# Patient Record
Sex: FEMALE | Race: WHITE
[De-identification: names, ages, dates, MRNs, and addresses within clinical notes are randomized per-mention and may not be internally consistent; named-entity substitution may affect disease eponyms.]

---

## 2020-03-16 ENCOUNTER — HOSPITAL ENCOUNTER (INPATIENT)
Dept: HOSPITAL 61 - ER | Age: 50
LOS: 155 days | DRG: 3 | End: 2020-08-18
Attending: INTERNAL MEDICINE | Admitting: INTERNAL MEDICINE
Payer: COMMERCIAL

## 2020-03-16 VITALS — SYSTOLIC BLOOD PRESSURE: 90 MMHG | DIASTOLIC BLOOD PRESSURE: 53 MMHG

## 2020-03-16 VITALS — SYSTOLIC BLOOD PRESSURE: 122 MMHG | DIASTOLIC BLOOD PRESSURE: 81 MMHG

## 2020-03-16 VITALS — BODY MASS INDEX: 26.9 KG/M2 | HEIGHT: 68 IN | WEIGHT: 177.47 LBS

## 2020-03-16 VITALS — DIASTOLIC BLOOD PRESSURE: 73 MMHG | SYSTOLIC BLOOD PRESSURE: 109 MMHG

## 2020-03-16 VITALS — DIASTOLIC BLOOD PRESSURE: 76 MMHG | SYSTOLIC BLOOD PRESSURE: 116 MMHG

## 2020-03-16 VITALS — SYSTOLIC BLOOD PRESSURE: 160 MMHG | DIASTOLIC BLOOD PRESSURE: 68 MMHG

## 2020-03-16 DIAGNOSIS — E43: ICD-10-CM

## 2020-03-16 DIAGNOSIS — K80.00: ICD-10-CM

## 2020-03-16 DIAGNOSIS — R13.13: ICD-10-CM

## 2020-03-16 DIAGNOSIS — I50.31: ICD-10-CM

## 2020-03-16 DIAGNOSIS — G93.41: ICD-10-CM

## 2020-03-16 DIAGNOSIS — A41.9: Primary | ICD-10-CM

## 2020-03-16 DIAGNOSIS — D72.810: ICD-10-CM

## 2020-03-16 DIAGNOSIS — N70.91: ICD-10-CM

## 2020-03-16 DIAGNOSIS — Z20.828: ICD-10-CM

## 2020-03-16 DIAGNOSIS — E11.65: ICD-10-CM

## 2020-03-16 DIAGNOSIS — K66.1: ICD-10-CM

## 2020-03-16 DIAGNOSIS — D25.9: ICD-10-CM

## 2020-03-16 DIAGNOSIS — K85.11: ICD-10-CM

## 2020-03-16 DIAGNOSIS — N13.30: ICD-10-CM

## 2020-03-16 DIAGNOSIS — Z99.2: ICD-10-CM

## 2020-03-16 DIAGNOSIS — E83.52: ICD-10-CM

## 2020-03-16 DIAGNOSIS — Z99.11: ICD-10-CM

## 2020-03-16 DIAGNOSIS — E87.0: ICD-10-CM

## 2020-03-16 DIAGNOSIS — Z45.2: ICD-10-CM

## 2020-03-16 DIAGNOSIS — G72.81: ICD-10-CM

## 2020-03-16 DIAGNOSIS — E78.5: ICD-10-CM

## 2020-03-16 DIAGNOSIS — Z51.5: ICD-10-CM

## 2020-03-16 DIAGNOSIS — D68.9: ICD-10-CM

## 2020-03-16 DIAGNOSIS — J96.21: ICD-10-CM

## 2020-03-16 DIAGNOSIS — K44.9: ICD-10-CM

## 2020-03-16 DIAGNOSIS — Z82.49: ICD-10-CM

## 2020-03-16 DIAGNOSIS — F41.0: ICD-10-CM

## 2020-03-16 DIAGNOSIS — Z74.01: ICD-10-CM

## 2020-03-16 DIAGNOSIS — R56.9: ICD-10-CM

## 2020-03-16 DIAGNOSIS — K92.2: ICD-10-CM

## 2020-03-16 DIAGNOSIS — I13.2: ICD-10-CM

## 2020-03-16 DIAGNOSIS — E11.22: ICD-10-CM

## 2020-03-16 DIAGNOSIS — E87.5: ICD-10-CM

## 2020-03-16 DIAGNOSIS — E83.51: ICD-10-CM

## 2020-03-16 DIAGNOSIS — J98.11: ICD-10-CM

## 2020-03-16 DIAGNOSIS — K21.9: ICD-10-CM

## 2020-03-16 DIAGNOSIS — N18.6: ICD-10-CM

## 2020-03-16 DIAGNOSIS — K56.7: ICD-10-CM

## 2020-03-16 DIAGNOSIS — R18.8: ICD-10-CM

## 2020-03-16 DIAGNOSIS — K31.1: ICD-10-CM

## 2020-03-16 DIAGNOSIS — E86.9: ICD-10-CM

## 2020-03-16 DIAGNOSIS — Z98.82: ICD-10-CM

## 2020-03-16 DIAGNOSIS — E66.9: ICD-10-CM

## 2020-03-16 DIAGNOSIS — R50.82: ICD-10-CM

## 2020-03-16 DIAGNOSIS — R65.21: ICD-10-CM

## 2020-03-16 DIAGNOSIS — N17.0: ICD-10-CM

## 2020-03-16 DIAGNOSIS — K76.0: ICD-10-CM

## 2020-03-16 DIAGNOSIS — D62: ICD-10-CM

## 2020-03-16 DIAGNOSIS — F32.9: ICD-10-CM

## 2020-03-16 LAB
% BANDS: 3 % (ref 0–9)
% LYMPHS: 6 % (ref 24–48)
% MONOS: 5 % (ref 0–10)
% SEGS: 86 % (ref 35–66)
ALBUMIN SERPL-MCNC: 3.4 G/DL (ref 3.4–5)
ALBUMIN/GLOB SERPL: 1.3 {RATIO} (ref 1–1.7)
ALP SERPL-CCNC: 87 U/L (ref 46–116)
ALT SERPL-CCNC: 249 U/L (ref 14–59)
ANION GAP SERPL CALC-SCNC: 6 MMOL/L (ref 6–14)
APTT PPP: YELLOW S
AST SERPL-CCNC: 401 U/L (ref 15–37)
BACTERIA #/AREA URNS HPF: 0 /HPF
BASOPHILS # BLD AUTO: 0 X10^3/UL (ref 0–0.2)
BASOPHILS NFR BLD: 0 % (ref 0–3)
BILIRUB SERPL-MCNC: 1.4 MG/DL (ref 0.2–1)
BILIRUB UR QL STRIP: NEGATIVE
BUN SERPL-MCNC: 16 MG/DL (ref 7–20)
BUN/CREAT SERPL: 13 (ref 6–20)
CALCIUM SERPL-MCNC: 8.9 MG/DL (ref 8.5–10.1)
CHLORIDE SERPL-SCNC: 105 MMOL/L (ref 98–107)
CO2 SERPL-SCNC: 27 MMOL/L (ref 21–32)
CREAT SERPL-MCNC: 1.2 MG/DL (ref 0.6–1)
EOSINOPHIL NFR BLD: 0 % (ref 0–3)
EOSINOPHIL NFR BLD: 0 X10^3/UL (ref 0–0.7)
ERYTHROCYTE [DISTWIDTH] IN BLOOD BY AUTOMATED COUNT: 12.9 % (ref 11.5–14.5)
FIBRINOGEN PPP-MCNC: CLEAR MG/DL
GFR SERPLBLD BASED ON 1.73 SQ M-ARVRAT: 47.7 ML/MIN
GLOBULIN SER-MCNC: 2.6 G/DL (ref 2.2–3.8)
GLUCOSE SERPL-MCNC: 196 MG/DL (ref 70–99)
HCT VFR BLD CALC: 44.3 % (ref 36–47)
HGB BLD-MCNC: 15.2 G/DL (ref 12–15.5)
LYMPHOCYTES # BLD: 1.3 X10^3/UL (ref 1–4.8)
LYMPHOCYTES NFR BLD AUTO: 7 % (ref 24–48)
MCH RBC QN AUTO: 33 PG (ref 25–35)
MCHC RBC AUTO-ENTMCNC: 34 G/DL (ref 31–37)
MCV RBC AUTO: 98 FL (ref 79–100)
MONO #: 0.7 X10^3/UL (ref 0–1.1)
MONOCYTES NFR BLD: 4 % (ref 0–9)
NEUT #: 15.7 X10^3/UL (ref 1.8–7.7)
NEUTROPHILS NFR BLD AUTO: 89 % (ref 31–73)
NITRITE UR QL STRIP: NEGATIVE
PH UR STRIP: 5.5 [PH]
PLATELET # BLD AUTO: 294 X10^3/UL (ref 140–400)
PLATELET # BLD EST: ADEQUATE 10*3/UL
POTASSIUM SERPL-SCNC: 4.3 MMOL/L (ref 3.5–5.1)
PROT SERPL-MCNC: 6 G/DL (ref 6.4–8.2)
PROT UR STRIP-MCNC: NEGATIVE MG/DL
RBC # BLD AUTO: 4.55 X10^6/UL (ref 3.5–5.4)
RBC #/AREA URNS HPF: 0 /HPF (ref 0–2)
SODIUM SERPL-SCNC: 138 MMOL/L (ref 136–145)
SQUAMOUS #/AREA URNS LPF: (no result) /LPF
UROBILINOGEN UR-MCNC: 1 MG/DL
WBC # BLD AUTO: 17.7 X10^3/UL (ref 4–11)
WBC #/AREA URNS HPF: 0 /HPF (ref 0–4)

## 2020-03-16 PROCEDURE — 36569 INSJ PICC 5 YR+ W/O IMAGING: CPT

## 2020-03-16 PROCEDURE — 82805 BLOOD GASES W/O2 SATURATION: CPT

## 2020-03-16 PROCEDURE — 76937 US GUIDE VASCULAR ACCESS: CPT

## 2020-03-16 PROCEDURE — 94002 VENT MGMT INPAT INIT DAY: CPT

## 2020-03-16 PROCEDURE — P9046 ALBUMIN (HUMAN), 25%, 20 ML: HCPCS

## 2020-03-16 PROCEDURE — 74170 CT ABD WO CNTRST FLWD CNTRST: CPT

## 2020-03-16 PROCEDURE — 36580 REPLACE CVAD CATH: CPT

## 2020-03-16 PROCEDURE — 84443 ASSAY THYROID STIM HORMONE: CPT

## 2020-03-16 PROCEDURE — 94660 CPAP INITIATION&MGMT: CPT

## 2020-03-16 PROCEDURE — 88304 TISSUE EXAM BY PATHOLOGIST: CPT

## 2020-03-16 PROCEDURE — 87804 INFLUENZA ASSAY W/OPTIC: CPT

## 2020-03-16 PROCEDURE — 86927 PLASMA FRESH FROZEN: CPT

## 2020-03-16 PROCEDURE — 96375 TX/PRO/DX INJ NEW DRUG ADDON: CPT

## 2020-03-16 PROCEDURE — 86920 COMPATIBILITY TEST SPIN: CPT

## 2020-03-16 PROCEDURE — 32557 INSERT CATH PLEURA W/ IMAGE: CPT

## 2020-03-16 PROCEDURE — 82310 ASSAY OF CALCIUM: CPT

## 2020-03-16 PROCEDURE — 99291 CRITICAL CARE FIRST HOUR: CPT

## 2020-03-16 PROCEDURE — 83880 ASSAY OF NATRIURETIC PEPTIDE: CPT

## 2020-03-16 PROCEDURE — 71250 CT THORAX DX C-: CPT

## 2020-03-16 PROCEDURE — A4215 STERILE NEEDLE: HCPCS

## 2020-03-16 PROCEDURE — 85730 THROMBOPLASTIN TIME PARTIAL: CPT

## 2020-03-16 PROCEDURE — C1769 GUIDE WIRE: HCPCS

## 2020-03-16 PROCEDURE — 36415 COLL VENOUS BLD VENIPUNCTURE: CPT

## 2020-03-16 PROCEDURE — 83605 ASSAY OF LACTIC ACID: CPT

## 2020-03-16 PROCEDURE — 87493 C DIFF AMPLIFIED PROBE: CPT

## 2020-03-16 PROCEDURE — 32555 ASPIRATE PLEURA W/ IMAGING: CPT

## 2020-03-16 PROCEDURE — 88312 SPECIAL STAINS GROUP 1: CPT

## 2020-03-16 PROCEDURE — 99153 MOD SED SAME PHYS/QHP EA: CPT

## 2020-03-16 PROCEDURE — 82607 VITAMIN B-12: CPT

## 2020-03-16 PROCEDURE — 51798 US URINE CAPACITY MEASURE: CPT

## 2020-03-16 PROCEDURE — 76700 US EXAM ABDOM COMPLETE: CPT

## 2020-03-16 PROCEDURE — 87077 CULTURE AEROBIC IDENTIFY: CPT

## 2020-03-16 PROCEDURE — P9017 PLASMA 1 DONOR FRZ W/IN 8 HR: HCPCS

## 2020-03-16 PROCEDURE — 96361 HYDRATE IV INFUSION ADD-ON: CPT

## 2020-03-16 PROCEDURE — 86900 BLOOD TYPING SEROLOGIC ABO: CPT

## 2020-03-16 PROCEDURE — 87103 BLOOD FUNGUS CULTURE: CPT

## 2020-03-16 PROCEDURE — 85610 PROTHROMBIN TIME: CPT

## 2020-03-16 PROCEDURE — 84478 ASSAY OF TRIGLYCERIDES: CPT

## 2020-03-16 PROCEDURE — 86317 IMMUNOASSAY INFECTIOUS AGENT: CPT

## 2020-03-16 PROCEDURE — P9041 ALBUMIN (HUMAN),5%, 50ML: HCPCS

## 2020-03-16 PROCEDURE — C9113 INJ PANTOPRAZOLE SODIUM, VIA: HCPCS

## 2020-03-16 PROCEDURE — 93306 TTE W/DOPPLER COMPLETE: CPT

## 2020-03-16 PROCEDURE — P9016 RBC LEUKOCYTES REDUCED: HCPCS

## 2020-03-16 PROCEDURE — 94640 AIRWAY INHALATION TREATMENT: CPT

## 2020-03-16 PROCEDURE — 86850 RBC ANTIBODY SCREEN: CPT

## 2020-03-16 PROCEDURE — 76770 US EXAM ABDO BACK WALL COMP: CPT

## 2020-03-16 PROCEDURE — 88305 TISSUE EXAM BY PATHOLOGIST: CPT

## 2020-03-16 PROCEDURE — 99152 MOD SED SAME PHYS/QHP 5/>YRS: CPT

## 2020-03-16 PROCEDURE — 86334 IMMUNOFIX E-PHORESIS SERUM: CPT

## 2020-03-16 PROCEDURE — 83615 LACTATE (LD) (LDH) ENZYME: CPT

## 2020-03-16 PROCEDURE — 36556 INSERT NON-TUNNEL CV CATH: CPT

## 2020-03-16 PROCEDURE — 36600 WITHDRAWAL OF ARTERIAL BLOOD: CPT

## 2020-03-16 PROCEDURE — C1729 CATH, DRAINAGE: HCPCS

## 2020-03-16 PROCEDURE — 84132 ASSAY OF SERUM POTASSIUM: CPT

## 2020-03-16 PROCEDURE — 85025 COMPLETE CBC W/AUTO DIFF WBC: CPT

## 2020-03-16 PROCEDURE — 80069 RENAL FUNCTION PANEL: CPT

## 2020-03-16 PROCEDURE — 81001 URINALYSIS AUTO W/SCOPE: CPT

## 2020-03-16 PROCEDURE — 85014 HEMATOCRIT: CPT

## 2020-03-16 PROCEDURE — 84157 ASSAY OF PROTEIN OTHER: CPT

## 2020-03-16 PROCEDURE — 76705 ECHO EXAM OF ABDOMEN: CPT

## 2020-03-16 PROCEDURE — 87071 CULTURE AEROBIC QUANT OTHER: CPT

## 2020-03-16 PROCEDURE — 49083 ABD PARACENTESIS W/IMAGING: CPT

## 2020-03-16 PROCEDURE — 74018 RADEX ABDOMEN 1 VIEW: CPT

## 2020-03-16 PROCEDURE — 83735 ASSAY OF MAGNESIUM: CPT

## 2020-03-16 PROCEDURE — 82784 ASSAY IGA/IGD/IGG/IGM EACH: CPT

## 2020-03-16 PROCEDURE — 77001 FLUOROGUIDE FOR VEIN DEVICE: CPT

## 2020-03-16 PROCEDURE — 71275 CT ANGIOGRAPHY CHEST: CPT

## 2020-03-16 PROCEDURE — C1892 INTRO/SHEATH,FIXED,PEEL-AWAY: HCPCS

## 2020-03-16 PROCEDURE — 49406 IMAGE CATH FLUID PERI/RETRO: CPT

## 2020-03-16 PROCEDURE — 87186 SC STD MICRODIL/AGAR DIL: CPT

## 2020-03-16 PROCEDURE — 80053 COMPREHEN METABOLIC PANEL: CPT

## 2020-03-16 PROCEDURE — P9045 ALBUMIN (HUMAN), 5%, 250 ML: HCPCS

## 2020-03-16 PROCEDURE — 82945 GLUCOSE OTHER FLUID: CPT

## 2020-03-16 PROCEDURE — 94760 N-INVAS EAR/PLS OXIMETRY 1: CPT

## 2020-03-16 PROCEDURE — 87102 FUNGUS ISOLATION CULTURE: CPT

## 2020-03-16 PROCEDURE — 74300 X-RAY BILE DUCTS/PANCREAS: CPT

## 2020-03-16 PROCEDURE — 82550 ASSAY OF CK (CPK): CPT

## 2020-03-16 PROCEDURE — 85007 BL SMEAR W/DIFF WBC COUNT: CPT

## 2020-03-16 PROCEDURE — 74177 CT ABD & PELVIS W/CONTRAST: CPT

## 2020-03-16 PROCEDURE — 87086 URINE CULTURE/COLONY COUNT: CPT

## 2020-03-16 PROCEDURE — 85045 AUTOMATED RETICULOCYTE COUNT: CPT

## 2020-03-16 PROCEDURE — 80076 HEPATIC FUNCTION PANEL: CPT

## 2020-03-16 PROCEDURE — 82306 VITAMIN D 25 HYDROXY: CPT

## 2020-03-16 PROCEDURE — 83550 IRON BINDING TEST: CPT

## 2020-03-16 PROCEDURE — 36573 INSJ PICC RS&I 5 YR+: CPT

## 2020-03-16 PROCEDURE — 82274 ASSAY TEST FOR BLOOD FECAL: CPT

## 2020-03-16 PROCEDURE — 83540 ASSAY OF IRON: CPT

## 2020-03-16 PROCEDURE — 82150 ASSAY OF AMYLASE: CPT

## 2020-03-16 PROCEDURE — 88112 CYTOPATH CELL ENHANCE TECH: CPT

## 2020-03-16 PROCEDURE — 49452 REPLACE G-J TUBE PERC: CPT

## 2020-03-16 PROCEDURE — 80202 ASSAY OF VANCOMYCIN: CPT

## 2020-03-16 PROCEDURE — 82962 GLUCOSE BLOOD TEST: CPT

## 2020-03-16 PROCEDURE — 84100 ASSAY OF PHOSPHORUS: CPT

## 2020-03-16 PROCEDURE — 87070 CULTURE OTHR SPECIMN AEROBIC: CPT

## 2020-03-16 PROCEDURE — 74176 CT ABD & PELVIS W/O CONTRAST: CPT

## 2020-03-16 PROCEDURE — 87205 SMEAR GRAM STAIN: CPT

## 2020-03-16 PROCEDURE — 85027 COMPLETE CBC AUTOMATED: CPT

## 2020-03-16 PROCEDURE — 83036 HEMOGLOBIN GLYCOSYLATED A1C: CPT

## 2020-03-16 PROCEDURE — 93970 EXTREMITY STUDY: CPT

## 2020-03-16 PROCEDURE — 74230 X-RAY XM SWLNG FUNCJ C+: CPT

## 2020-03-16 PROCEDURE — 86901 BLOOD TYPING SEROLOGIC RH(D): CPT

## 2020-03-16 PROCEDURE — 96365 THER/PROPH/DIAG IV INF INIT: CPT

## 2020-03-16 PROCEDURE — 76856 US EXAM PELVIC COMPLETE: CPT

## 2020-03-16 PROCEDURE — 83690 ASSAY OF LIPASE: CPT

## 2020-03-16 PROCEDURE — 87340 HEPATITIS B SURFACE AG IA: CPT

## 2020-03-16 PROCEDURE — 74174 CTA ABD&PLVS W/CONTRAST: CPT

## 2020-03-16 PROCEDURE — 94003 VENT MGMT INPAT SUBQ DAY: CPT

## 2020-03-16 PROCEDURE — 87075 CULTR BACTERIA EXCEPT BLOOD: CPT

## 2020-03-16 PROCEDURE — C1894 INTRO/SHEATH, NON-LASER: HCPCS

## 2020-03-16 PROCEDURE — C1751 CATH, INF, PER/CENT/MIDLINE: HCPCS

## 2020-03-16 PROCEDURE — 87040 BLOOD CULTURE FOR BACTERIA: CPT

## 2020-03-16 PROCEDURE — 85018 HEMOGLOBIN: CPT

## 2020-03-16 PROCEDURE — G0378 HOSPITAL OBSERVATION PER HR: HCPCS

## 2020-03-16 PROCEDURE — 71045 X-RAY EXAM CHEST 1 VIEW: CPT

## 2020-03-16 PROCEDURE — 31720 CLEARANCE OF AIRWAYS: CPT

## 2020-03-16 PROCEDURE — C1757 CATH, THROMBECTOMY/EMBOLECT: HCPCS

## 2020-03-16 PROCEDURE — 95816 EEG AWAKE AND DROWSY: CPT

## 2020-03-16 PROCEDURE — 80048 BASIC METABOLIC PNL TOTAL CA: CPT

## 2020-03-16 PROCEDURE — 87106 FUNGI IDENTIFICATION YEAST: CPT

## 2020-03-16 PROCEDURE — 83970 ASSAY OF PARATHORMONE: CPT

## 2020-03-16 PROCEDURE — 87635 SARS-COV-2 COVID-19 AMP PRB: CPT

## 2020-03-16 PROCEDURE — 76830 TRANSVAGINAL US NON-OB: CPT

## 2020-03-16 PROCEDURE — 86704 HEP B CORE ANTIBODY TOTAL: CPT

## 2020-03-16 RX ADMIN — MORPHINE SULFATE PRN MG: 2 INJECTION, SOLUTION INTRAMUSCULAR; INTRAVENOUS at 14:11

## 2020-03-16 RX ADMIN — HYDROMORPHONE HYDROCHLORIDE PRN MG: 2 INJECTION INTRAMUSCULAR; INTRAVENOUS; SUBCUTANEOUS at 23:02

## 2020-03-16 RX ADMIN — MORPHINE SULFATE PRN MG: 2 INJECTION, SOLUTION INTRAMUSCULAR; INTRAVENOUS at 20:58

## 2020-03-16 RX ADMIN — INSULIN LISPRO SCH UNITS: 100 INJECTION, SOLUTION INTRAVENOUS; SUBCUTANEOUS at 18:00

## 2020-03-16 RX ADMIN — PANTOPRAZOLE SODIUM SCH MG: 40 INJECTION, POWDER, FOR SOLUTION INTRAVENOUS at 12:03

## 2020-03-16 RX ADMIN — INSULIN LISPRO SCH UNITS: 100 INJECTION, SOLUTION INTRAVENOUS; SUBCUTANEOUS at 09:30

## 2020-03-16 RX ADMIN — INSULIN LISPRO SCH UNITS: 100 INJECTION, SOLUTION INTRAVENOUS; SUBCUTANEOUS at 13:28

## 2020-03-16 RX ADMIN — BACITRACIN SCH MLS/HR: 5000 INJECTION, POWDER, FOR SOLUTION INTRAMUSCULAR at 16:07

## 2020-03-16 RX ADMIN — ONDANSETRON PRN MG: 2 INJECTION INTRAMUSCULAR; INTRAVENOUS at 16:13

## 2020-03-16 RX ADMIN — PROCHLORPERAZINE EDISYLATE PRN MG: 5 INJECTION INTRAMUSCULAR; INTRAVENOUS at 17:46

## 2020-03-16 RX ADMIN — HYDROMORPHONE HYDROCHLORIDE PRN MG: 2 INJECTION INTRAMUSCULAR; INTRAVENOUS; SUBCUTANEOUS at 09:11

## 2020-03-16 RX ADMIN — MORPHINE SULFATE PRN MG: 2 INJECTION, SOLUTION INTRAMUSCULAR; INTRAVENOUS at 12:02

## 2020-03-16 RX ADMIN — ONDANSETRON PRN MG: 2 INJECTION INTRAMUSCULAR; INTRAVENOUS at 20:58

## 2020-03-16 RX ADMIN — MORPHINE SULFATE PRN MG: 2 INJECTION, SOLUTION INTRAMUSCULAR; INTRAVENOUS at 07:50

## 2020-03-16 RX ADMIN — MORPHINE SULFATE PRN MG: 2 INJECTION, SOLUTION INTRAMUSCULAR; INTRAVENOUS at 10:10

## 2020-03-16 RX ADMIN — BACITRACIN SCH MLS/HR: 5000 INJECTION, POWDER, FOR SOLUTION INTRAMUSCULAR at 05:46

## 2020-03-16 RX ADMIN — ONDANSETRON PRN MG: 2 INJECTION INTRAMUSCULAR; INTRAVENOUS at 12:30

## 2020-03-16 RX ADMIN — HYDROMORPHONE HYDROCHLORIDE PRN MG: 2 INJECTION INTRAMUSCULAR; INTRAVENOUS; SUBCUTANEOUS at 12:31

## 2020-03-16 RX ADMIN — MORPHINE SULFATE PRN MG: 2 INJECTION, SOLUTION INTRAMUSCULAR; INTRAVENOUS at 16:07

## 2020-03-16 RX ADMIN — HYDROMORPHONE HYDROCHLORIDE PRN MG: 2 INJECTION INTRAMUSCULAR; INTRAVENOUS; SUBCUTANEOUS at 17:02

## 2020-03-16 RX ADMIN — METOPROLOL TARTRATE SCH MG: 5 INJECTION INTRAVENOUS at 20:58

## 2020-03-16 RX ADMIN — BACITRACIN SCH MLS/HR: 5000 INJECTION, POWDER, FOR SOLUTION INTRAMUSCULAR at 20:56

## 2020-03-16 NOTE — PDOC1
History and Physical


Date of Admission


Date of Admission


DATE: 3/16/20 


TIME: 09:19





Identification/Chief Complaint


Chief Complaint


Abdominal pain





Source


Source:  Patient





History of Present Illness


History of Present Illness


Ms Tadeo is a 50yo F w/ PMHx HTN, prediabetes who presents the emergency room

complaints of abdominal pain. Patient described off and on 3 days. She states is

constant, described as a squeezing sensation in a band-like distribution. + 

nausea, vomiting.  She denies any fever or diarrhea.  Patient denies any 

abdominal surgical procedures.  She states is worse with movements, car ride.  

Pain initially was upper abdomen however now pretty much generalized.  Last 

bowel movement was 3/15/2020. Nothing makes her pain better.  Patient denies any

shortness of breath.  She does state the pain moves into her chest.  Denies any 

headache or visual changes.


Lipase 50573, , , Bilirubin 1.4.


CT abdomen confirms pancreatic inflammation, peripancreatic fluid and 

inflammatory changes around the pancreas consistent with pancreatitis. 

Cholelithiasis and 1.4cm uterine fibroid as well as possible left salpingitis. 

Admitted for further care





Past Medical History


Cardiovascular:  HTN





Past Surgical History


Past Surgical History:  Other (Breast augmentation)





Family History


Family History:  High Cholestrol, Hypertension





Social History


Smoke:  No


ALCOHOL:  rare


Drugs:  None





Current Problem List


Problem List


Problems


Medical Problems:


(1) Acute pancreatitis


Status: Acute  





(2) Cholelithiasis


Status: Acute  











Current Medications


Current Medications





Current Medications


Sodium Chloride 1,000 ml @  1,000 mls/hr Q1H IV  Last administered on 3/16/20at 

03:00;  Start 3/16/20 at 03:00;  Stop 3/16/20 at 03:59;  Status DC


Ondansetron HCl (Zofran) 4 mg 1X  ONCE IVP  Last administered on 3/16/20at 

03:27;  Start 3/16/20 at 03:00;  Stop 3/16/20 at 03:01;  Status DC


Morphine Sulfate (Morphine Sulfate) 4 mg 1X  ONCE IV ;  Start 3/16/20 at 03:00; 

Stop 3/16/20 at 03:01;  Status Cancel


Ketorolac Tromethamine (Toradol 30mg Vial) 30 mg 1X  ONCE IV  Last administered 

on 3/16/20at 02:54;  Start 3/16/20 at 03:00;  Stop 3/16/20 at 03:01;  Status DC


Fentanyl Citrate (Fentanyl 2ml Vial) 25 mcg 1X  ONCE IVP  Last administered on 

3/16/20at 03:23;  Start 3/16/20 at 03:30;  Stop 3/16/20 at 03:31;  Status DC


Fentanyl Citrate (Fentanyl 2ml Vial) 100 mcg STK-MED ONCE .ROUTE ;  Start 

3/16/20 at 03:18;  Stop 3/16/20 at 03:18;  Status DC


Iohexol (Omnipaque 350 Mg/ml) 90 ml 1X  ONCE IV  Last administered on 3/16/20at 

03:25;  Start 3/16/20 at 03:30;  Stop 3/16/20 at 03:31;  Status DC


Info (CONTRAST GIVEN -- Rx MONITORING) 1 each PRN DAILY  PRN MC SEE COMMENTS;  

Start 3/16/20 at 03:30;  Stop 3/18/20 at 03:29


Hydromorphone HCl (Dilaudid) 0.5 mg 1X  ONCE IV  Last administered on 3/16/20at 

03:55;  Start 3/16/20 at 04:30;  Stop 3/16/20 at 04:32;  Status DC


Ondansetron HCl (Zofran) 4 mg PRN Q8HRS  PRN IV NAUSEA/VOMITING 1ST CHOICE;  

Start 3/16/20 at 05:00;  Stop 3/17/20 at 04:59


Morphine Sulfate (Morphine Sulfate) 2 mg PRN Q2HR  PRN IV SEVERE PAIN 7-10 Last 

administered on 3/16/20at 07:50;  Start 3/16/20 at 05:00;  Stop 3/17/20 at 04:59


Sodium Chloride 1,000 ml @  125 mls/hr Q8H IV  Last administered on 3/16/20at 

05:46;  Start 3/16/20 at 05:00;  Stop 3/17/20 at 04:59


Hydromorphone HCl (Dilaudid) 0.5 mg PRN Q3HRS  PRN IV SEVERE PAIN 7-10 Last 

administered on 3/16/20at 09:11;  Start 3/16/20 at 05:00


Piperacillin Sod/ Tazobactam Sod 4.5 gm/Sodium Chloride 100 ml @  200 mls/hr 1X 

ONCE IV  Last administered on 3/16/20at 05:44;  Start 3/16/20 at 06:00;  Stop 

3/16/20 at 06:29;  Status DC





Allergies


Allergies:  


Coded Allergies:  


     codeine (Verified  Allergy, Intermediate, rash, 3/16/20)





ROS


General:  YES: Appetite; 


   No: Chills, Night Sweats, Fatigue, Malaise, Other


PSYCHOLOGICAL ROS:  No: Anxiety, Behavioral Disorder, Concentration difficultie,

Decreased libido, Depression, Disorientation, Hallucinations, Hostility, 

Irritablity, Memory difficulties, Mood Swings, Obsessive thoughts, Physical 

abuse, Sexual abuse, Sleep disturbances, Suicidal ideation, Other


Eyes:  No Blurry vision, No Decreased vision, No Double vision, No Dry eyes, No 

Excessive tearing, No Eye Pain, No Itchy Eyes, No Loss of vision, No 

Photophobia, No Scotomata, No Uses contacts, No Uses glasses, No Other


HEENT:  No: Heacaches, Visual Changes, Hearing change, Nasal congestion, Nasal 

discharge, Oral lesions, Sinus pain, Sore Throat, Epistaxis, Sneezing, Snoring, 

Tinnitus, Vertigo, Vocal changes, Other


ALLERGY AND IMMUNOLOGY:  No: Hives, Insect Bite Sensitivity, Itchy/Watery Eyes, 

Nasal Congestion, Post Nasal Drip, Seasonal Allergies, Other


Hematological and Lymphatic:  No: Bleeding Problems, Blood Clots, Blood 

Transfusions, Brusing, Night Sweats, Pallor, Swollen Lymph Nodes, Other


ENDOCRINE:  No: Breast Changes, Galactorrhea, Hair Pattern Changes, Hot Flashes,

Malaise/lethargy, Mood Swings, Palpitations, Polydipsia/polyuria, Skin Changes, 

Temperature Intolerance, Unexpected Weight Changes, Other


Breast:  No New/Changing Breast Lumps, No Nipple changes, No Nipple discharge, 

No Other


Respiratory:  No: Cough, Hemoptysis, Orthopnea, Pleuritic Pain, Shortness of 

breath, SOB with excertion, Sputum Changes, Stridor, Tachypnea, Wheezing, Other


Cardiovascular:  No Chest Pain, No Palpitations, No Orthopnea, No Paroxysmal 

Noc. Dyspnea, No Edema, No Lt Headedness, No Other


Gastrointestinal:  Yes Nausea, Yes Vomiting, Yes Abdominal Pain; 


   No Diarrhea, No Constipation, No Melena, No Hematochezia, No Other


Genitourinary:  No Dysuria, No Frequency, No Incontinence, No Hematuria, No 

Retention, No Discharge, No Urgency, No Pain, No Flank Pain, No Other, No , No ,

No , No , No , No , No 


Musculoskeletal:  No Gait Disturbance, No Joint Pain, No Joint Stiffness, No 

Joint Swelling, No Muscle Pain, No Muscular Weakness, No Pain In:, No Swelling 

In:, No Other


Neurological:  No Behavorial Changes, No Bowel/Bladder ControlChng, No 

Confusion, No Dizziness, No Gait Disturbance, No Headaches, No Impaired 

Coord/balance, No Memory Loss, No Numbness/Tingling, No Seizures, No Speech 

Problems, No Tremors, No Visual Changes, No Weakness, No Other


Skin:  No Dry Skin, No Eczema, No Hair Changes, No Lumps, No Mole Changes, No 

Mottling, No Nail Changes, No Pruritus, No Rash, No Skin Lesion Changes, No 

Other, No Acne





Physical Exam


General:  Alert, Oriented X3, Cooperative, moderate distress


HEENT:  Atraumatic, PERRLA, EOMI, Mucous membr. moist/pink


Lungs:  Clear to auscultation, Normal air movement


Heart:  S1S2, RRR, no thrills, no rubs, no gallops, no murmurs


Abdomen:  Normal bowel sounds, Soft, No hepatosplenomegaly, No masses, Other 

(Diffuse tenderness)


Rectal Exam:  not examined


Extremities:  No clubbing, No cyanosis, No edema, Normal pulses, No 

tenderness/swelling


Skin:  No rashes, No breakdown, No significant lesion


Neuro:  Normal gait, Normal speech, Strength at 5/5 X4 ext, Normal tone, 

Sensation intact, Cranial nerves 3-12 NL, Reflexes 2+


Psych/Mental Status:  Mental status NL, Mood NL





Vitals


Vitals





Vital Signs








  Date Time  Temp Pulse Resp B/P (MAP) Pulse Ox O2 Delivery O2 Flow Rate FiO2


 


3/16/20 09:11     99   


 


3/16/20 07:50      Room Air  


 


3/16/20 07:00 97.4 61 16 90/53 (65)    





 97.4       











Labs


Labs





Laboratory Tests








Test


 3/16/20


03:40 3/16/20


05:31


 


White Blood Count


 17.7 x10^3/uL


(4.0-11.0) 





 


Red Blood Count


 4.55 x10^6/uL


(3.50-5.40) 





 


Hemoglobin


 15.2 g/dL


(12.0-15.5) 





 


Hematocrit


 44.3 %


(36.0-47.0) 





 


Mean Corpuscular Volume 98 fL ()  


 


Mean Corpuscular Hemoglobin 33 pg (25-35)  


 


Mean Corpuscular Hemoglobin


Concent 34 g/dL


(31-37) 





 


Red Cell Distribution Width


 12.9 %


(11.5-14.5) 





 


Platelet Count


 294 x10^3/uL


(140-400) 





 


Neutrophils (%) (Auto) 89 % (31-73)  


 


Lymphocytes (%) (Auto) 7 % (24-48)  


 


Monocytes (%) (Auto) 4 % (0-9)  


 


Eosinophils (%) (Auto) 0 % (0-3)  


 


Basophils (%) (Auto) 0 % (0-3)  


 


Neutrophils # (Auto)


 15.7 x10^3/uL


(1.8-7.7) 





 


Lymphocytes # (Auto)


 1.3 x10^3/uL


(1.0-4.8) 





 


Monocytes # (Auto)


 0.7 x10^3/uL


(0.0-1.1) 





 


Eosinophils # (Auto)


 0.0 x10^3/uL


(0.0-0.7) 





 


Basophils # (Auto)


 0.0 x10^3/uL


(0.0-0.2) 





 


Segmented Neutrophils % 86 % (35-66)  


 


Band Neutrophils % 3 % (0-9)  


 


Lymphocytes % 6 % (24-48)  


 


Monocytes % 5 % (0-10)  


 


Platelet Estimate


 Adequate


(ADEQUATE) 





 


Sodium Level


 138 mmol/L


(136-145) 





 


Potassium Level


 4.3 mmol/L


(3.5-5.1) 





 


Chloride Level


 105 mmol/L


() 





 


Carbon Dioxide Level


 27 mmol/L


(21-32) 





 


Anion Gap 6 (6-14)  


 


Blood Urea Nitrogen


 16 mg/dL


(7-20) 





 


Creatinine


 1.2 mg/dL


(0.6-1.0) 





 


Estimated GFR


(Cockcroft-Gault) 47.7 


 





 


BUN/Creatinine Ratio 13 (6-20)  


 


Glucose Level


 196 mg/dL


(70-99) 





 


Calcium Level


 8.9 mg/dL


(8.5-10.1) 





 


Total Bilirubin


 1.4 mg/dL


(0.2-1.0) 





 


Aspartate Amino Transf


(AST/SGOT) 401 U/L


(15-37) 





 


Alanine Aminotransferase


(ALT/SGPT) 249 U/L


(14-59) 





 


Alkaline Phosphatase


 87 U/L


() 





 


Total Protein


 6.0 g/dL


(6.4-8.2) 





 


Albumin


 3.4 g/dL


(3.4-5.0) 





 


Albumin/Globulin Ratio 1.3 (1.0-1.7)  


 


Lipase


 60250 U/L


() 





 


Urine Collection Type  Unknown 


 


Urine Color  Yellow 


 


Urine Clarity  Clear 


 


Urine pH  5.5 (<5.0-8.0) 


 


Urine Specific Gravity


 


 >=1.030


(1.000-1.030)


 


Urine Protein


 


 Negative mg/dL


(NEG-TRACE)


 


Urine Glucose (UA)


 


 Negative mg/dL


(NEG)


 


Urine Ketones (Stick)


 


 Negative mg/dL


(NEG)


 


Urine Blood  Negative (NEG) 


 


Urine Nitrite  Negative (NEG) 


 


Urine Bilirubin  Negative (NEG) 


 


Urine Urobilinogen Dipstick


 


 1.0 mg/dL (0.2


mg/dL)


 


Urine Leukocyte Esterase  Negative (NEG) 


 


Urine RBC  0 /HPF (0-2) 


 


Urine WBC  0 /HPF (0-4) 


 


Urine Squamous Epithelial


Cells 


 Few /LPF 





 


Urine Bacteria  0 /HPF (0-FEW) 








Laboratory Tests








Test


 3/16/20


03:40 3/16/20


05:31


 


White Blood Count


 17.7 x10^3/uL


(4.0-11.0) 





 


Red Blood Count


 4.55 x10^6/uL


(3.50-5.40) 





 


Hemoglobin


 15.2 g/dL


(12.0-15.5) 





 


Hematocrit


 44.3 %


(36.0-47.0) 





 


Mean Corpuscular Volume 98 fL ()  


 


Mean Corpuscular Hemoglobin 33 pg (25-35)  


 


Mean Corpuscular Hemoglobin


Concent 34 g/dL


(31-37) 





 


Red Cell Distribution Width


 12.9 %


(11.5-14.5) 





 


Platelet Count


 294 x10^3/uL


(140-400) 





 


Neutrophils (%) (Auto) 89 % (31-73)  


 


Lymphocytes (%) (Auto) 7 % (24-48)  


 


Monocytes (%) (Auto) 4 % (0-9)  


 


Eosinophils (%) (Auto) 0 % (0-3)  


 


Basophils (%) (Auto) 0 % (0-3)  


 


Neutrophils # (Auto)


 15.7 x10^3/uL


(1.8-7.7) 





 


Lymphocytes # (Auto)


 1.3 x10^3/uL


(1.0-4.8) 





 


Monocytes # (Auto)


 0.7 x10^3/uL


(0.0-1.1) 





 


Eosinophils # (Auto)


 0.0 x10^3/uL


(0.0-0.7) 





 


Basophils # (Auto)


 0.0 x10^3/uL


(0.0-0.2) 





 


Segmented Neutrophils % 86 % (35-66)  


 


Band Neutrophils % 3 % (0-9)  


 


Lymphocytes % 6 % (24-48)  


 


Monocytes % 5 % (0-10)  


 


Platelet Estimate


 Adequate


(ADEQUATE) 





 


Sodium Level


 138 mmol/L


(136-145) 





 


Potassium Level


 4.3 mmol/L


(3.5-5.1) 





 


Chloride Level


 105 mmol/L


() 





 


Carbon Dioxide Level


 27 mmol/L


(21-32) 





 


Anion Gap 6 (6-14)  


 


Blood Urea Nitrogen


 16 mg/dL


(7-20) 





 


Creatinine


 1.2 mg/dL


(0.6-1.0) 





 


Estimated GFR


(Cockcroft-Gault) 47.7 


 





 


BUN/Creatinine Ratio 13 (6-20)  


 


Glucose Level


 196 mg/dL


(70-99) 





 


Calcium Level


 8.9 mg/dL


(8.5-10.1) 





 


Total Bilirubin


 1.4 mg/dL


(0.2-1.0) 





 


Aspartate Amino Transf


(AST/SGOT) 401 U/L


(15-37) 





 


Alanine Aminotransferase


(ALT/SGPT) 249 U/L


(14-59) 





 


Alkaline Phosphatase


 87 U/L


() 





 


Total Protein


 6.0 g/dL


(6.4-8.2) 





 


Albumin


 3.4 g/dL


(3.4-5.0) 





 


Albumin/Globulin Ratio 1.3 (1.0-1.7)  


 


Lipase


 19420 U/L


() 





 


Urine Collection Type  Unknown 


 


Urine Color  Yellow 


 


Urine Clarity  Clear 


 


Urine pH  5.5 (<5.0-8.0) 


 


Urine Specific Gravity


 


 >=1.030


(1.000-1.030)


 


Urine Protein


 


 Negative mg/dL


(NEG-TRACE)


 


Urine Glucose (UA)


 


 Negative mg/dL


(NEG)


 


Urine Ketones (Stick)


 


 Negative mg/dL


(NEG)


 


Urine Blood  Negative (NEG) 


 


Urine Nitrite  Negative (NEG) 


 


Urine Bilirubin  Negative (NEG) 


 


Urine Urobilinogen Dipstick


 


 1.0 mg/dL (0.2


mg/dL)


 


Urine Leukocyte Esterase  Negative (NEG) 


 


Urine RBC  0 /HPF (0-2) 


 


Urine WBC  0 /HPF (0-4) 


 


Urine Squamous Epithelial


Cells 


 Few /LPF 





 


Urine Bacteria  0 /HPF (0-FEW) 











Images


Images


CT neck/chest/abdomen/pelvis - No abnormality seen at the thyroid gland. The 

trachea and mainstem bronchi show no intraluminal lesions. No abnormality seen 

at the esophagus. There is a small hiatal hernia. The thoracic aorta shows no 

aneurysmal dilatation or dissection. The heart size is normal with no 

pericardial effusion. Lung fields show some dependent atelectasis but no 

consolidated infiltrates, nodules or pleural effusions are seen. No acute bony 

abnormalities are seen in the thorax. Bilateral breast implants appear to be 

present and appear to be intact.


 


The liver contains a small cystic lesion measuring 1.5 cm in size. No 

abnormality is seen at the spleen, or adrenal glands. Gallbladder shows 

cholelithiasis. The pancreas shows enlarged pancreatic head and there is fluid 

and inflammatory type changes in the peripancreatic region consistent with 

pancreatitis. The abdominal aorta shows no aneurysmal dilatation or dissection. 

No abnormality seen at the inferior vena cava. 


The kidneys show no renal masses, renal calculi, hydronephrosis or evidence of 

obstructive uropathy. No abnormality seen in the stomach or duodenum. The small 

intestinal tract shows some mild dilatation proximally but no small bowel 

obstruction is seen. There are a few diverticuli in the sigmoid colon but no 

evidence of diverticulitis or colitis. No abnormality seen at the appendix.


 


The bladder is not distended. The uterus shows a small 1.4 cm hyperdensity 

posteriorly which may represent a fibroid. There is some tubular enhancement in 

the left adnexa which may reflect enhancement of the fallopian tube and could 

reflect salpingitis. No free fluid is seen in the pelvis. No free air is seen in

the abdomen or pelvis.


 


IMPRESSION:


 


Small hiatal hernia. No aneurysm or dissection involving the thoracic or 

abdominal aorta. 1.5 cm cystic lesion in the liver. Prominent pancreatic head 

with peripancreatic fluid and inflammatory changes around the pancreas 

consistent with pancreatitis. Cholelithiasis. Tubular enhancement in the left 

adnexa possibly reflecting salpingitis. 1.4 cm hypodensity within the posterior 

uterus possibly representing a fibroid.





RUQ US


Evaluation of the pancreas is limited due to bowel gas. Pancreatic head however 

appears enlarged. The abdominal aorta and inferior vena cava are poorly 

visualized. The liver is normal in size but shows fatty infiltration without a 

focal lesion identified. Gallbladder shows cholelithiasis with wall thickening 

at 4.2 mm but no pericholecystic fluid is seen. Common bile duct is normal in 

caliber at 5.6 mm. Right kidney measures 11.9 x 6.2 x 4.3 cm in greatest 

dimension and shows normal cortical medullary differentiation with no mass or 

hydronephrosis seen.


 


IMPRESSION:


Prominent pancreatic head. Cholelithiasis with wall thickening.





Transabdominal and transvaginal ultrasound:


Transabdominally, there is poor visualization of the pelvic structures.


Transvaginally, the uterus measures 8.7 x 6.0 x 5.8 cm in greatest dimension. 

There is a small 1.3 x 1.1 x 1.7 cm uterine mass consistent with a probable 

fibroid. Endometrium is not abnormally thickened at 11.5 mm. The left ovary 

measures 2.2 x 3.3 x 2.7 cm in greatest dimension and contains a small 5.2 mm 

follicle right ovary measures 2.1 x 0.9 x 1.9 cm in greatest dimension and 

contains several small follicles measuring up to 5.3 mm in size. There appears 

to be normal vascular flow bilaterally in the ovaries. There is a small amount 

of free fluid present in the pelvis. No fallopian tube dilatation is seen to 

suggest salpingitis sonographically. There are however some prominent veins 

present.


 


IMPRESSION:


Small uterine lesion probably representing a fibroid measuring 1.7 cm in 

greatest dimension. Small follicles bilaterally in the ovaries. Small amount of 

free fluid in the pelvis. Prominent veins in the left adnexa. No fallopian tube 

abnormality apparent.





VTE Prophylaxis Ordered


VTE Prophylaxis Devices:  No


VTE Pharmacological Prophylaxi:  Yes





Assessment/Plan


Assessment/Plan


A/P:


Acute pancreatitis - without necrosis or infection, likely gallstone 

pancreatitis. GI and general surgery consulted. Will keep NPO, pain control


Calculus of gallbladder with acute cholecystitis without obstruction - with 

secondary pancreatitis. Lap naif is indicated


Prediabetes - will keep on sliding scale


HTN - cont prn hydralazine


Leukocytosis - she meets SIRS criteria with acute pancreatitis as end organ 

dysfunction, no sign of infection





FEN - NPO


PPX - lovenox


FULL CODE


Dispo - inpatient for 2 midnights











GAETANO GONCALVES MD        Mar 16, 2020 09:25

## 2020-03-16 NOTE — RAD
CT angiogram of the chest, abdomen and pelvis:

 

Reason for examination: Abdominal pain straight to back. Hypotension and 

abdominal distention.

 

Helical images were obtained through the chest, abdomen and pelvis with 

intravenous administration of 90 cc Omnipaque 350 using angiographic 

protocol. 3-D MIPS reconstruction was performed in sagittal and coronal 

planes.

 

Exposure: One or more of the following individualized dose reduction 

techniques were utilized for this examination:  1. Automated exposure 

control  2. Adjustment of the mA and/or kV according to patient size  3. 

Use of iterative reconstruction technique.

 

No abnormality seen at the thyroid gland. The trachea and mainstem bronchi

show no intraluminal lesions. No abnormality seen at the esophagus. There 

is a small hiatal hernia. The thoracic aorta shows no aneurysmal 

dilatation or dissection. The heart size is normal with no pericardial 

effusion. Lung fields show some dependent atelectasis but no consolidated 

infiltrates, nodules or pleural effusions are seen. No acute bony 

abnormalities are seen in the thorax. Bilateral breast implants appear to 

be present and appear to be intact.

 

The liver contains a small cystic lesion measuring 1.5 cm in size. No 

abnormality is seen at the spleen, or adrenal glands. Gallbladder shows 

cholelithiasis. The pancreas shows enlarged pancreatic head and there is 

fluid and inflammatory type changes in the peripancreatic region 

consistent with pancreatitis. The abdominal aorta shows no aneurysmal 

dilatation or dissection. No abnormality seen at the inferior vena cava. 

The kidneys show no renal masses, renal calculi, hydronephrosis or 

evidence of obstructive uropathy. No abnormality seen in the stomach or 

duodenum. The small intestinal tract shows some mild dilatation proximally

but no small bowel obstruction is seen. There are a few diverticuli in the

sigmoid colon but no evidence of diverticulitis or colitis. No abnormality

seen at the appendix.

 

The bladder is not distended. The uterus shows a small 1.4 cm hyperdensity

posteriorly which may represent a fibroid. There is some tubular 

enhancement in the left adnexa which may reflect enhancement of the 

fallopian tube and could reflect salpingitis. No free fluid is seen in the

pelvis. No free air is seen in the abdomen or pelvis.

 

IMPRESSION:

 

Small hiatal hernia. No aneurysm or dissection involving the thoracic or 

abdominal aorta. 1.5 cm cystic lesion in the liver. Prominent pancreatic 

head with peripancreatic fluid and inflammatory changes around the 

pancreas consistent with pancreatitis. Cholelithiasis. Tubular enhancement

in the left adnexa possibly reflecting salpingitis. 1.4 cm hypodensity 

within the posterior uterus possibly representing a fibroid.

 

Electronically signed by: Ladan Mckenna MD (3/16/2020 3:36 AM) [id number]

## 2020-03-16 NOTE — PHYS DOC
Adult General


Chief Complaint


Chief Complaint:  ABDOMINAL PAIN





HPI


HPI





40-year-old female presents the emergency room complaints of abdominal pain.  

Patient described off and on x2 days worse tonight.  She states is constant, 

described as a squeezing sensation.  She does describe nausea, vomiting.  She 

denies any fever or diarrhea.  Patient denies any abdominal surgical procedures.

 She states is worse with movements, car ride.  Pain initially was upper abdomen

however now pretty much generalized.  Last bowel movement was today.  Patient is

only past medical history includes hypertension and diabetes.  Nothing makes her

pain better.  Patient denies any shortness of breath.  She does state the pain 

moves into her chest.  Denies any headache or visual changes.





Review of Systems


Review of Systems





Constitutional: Denies fever or chills []


Respiratory: Denies cough or shortness of breath []


Cardiovascular: No additional information not addressed in HPI []


GI: + abdominal pain, nausea, vomiting, no bloody stools or diarrhea []


: Denies dysuria or hematuria []


Musculoskeletal: Denies back pain or joint pain []


Integument: psoriasis


Neurologic: Denies headache, focal weakness or sensory changes []





All other systems were reviewed and found to be within normal limits, except as 

documented in this note.





Current Medications


Current Medications





Current Medications








 Medications


  (Trade)  Dose


 Ordered  Sig/Yee  Start Time


 Stop Time Status Last Admin


Dose Admin


 


 Fentanyl Citrate


  (Fentanyl 2ml


 Vial)  100 mcg  STK-MED ONCE  3/16/20 03:18


 3/16/20 03:18 DC  





 


 Hydromorphone HCl


  (Dilaudid)  0.5 mg  PRN Q3HRS  PRN  3/16/20 05:00


     





 


 Info


  (CONTRAST GIVEN


 -- Rx MONITORING)  1 each  PRN DAILY  PRN  3/16/20 03:30


 3/18/20 03:29   





 


 Iohexol


  (Omnipaque 350


 Mg/ml)  90 ml  1X  ONCE  3/16/20 03:30


 3/16/20 03:31 DC 3/16/20 03:25


90 ML


 


 Ketorolac


 Tromethamine


  (Toradol 30mg


 Vial)  30 mg  1X  ONCE  3/16/20 03:00


 3/16/20 03:01 DC 3/16/20 02:54


30 MG


 


 Morphine Sulfate


  (Morphine


 Sulfate)  2 mg  PRN Q2HR  PRN  3/16/20 05:00


 3/17/20 04:59   





 


 Ondansetron HCl


  (Zofran)  4 mg  PRN Q8HRS  PRN  3/16/20 05:00


 3/17/20 04:59   





 


 Sodium Chloride  1,000 ml @ 


 125 mls/hr  Q8H  3/16/20 05:00


 3/17/20 04:59   














Allergies


Allergies





Allergies








Coded Allergies Type Severity Reaction Last Updated Verified


 


  codeine Allergy Intermediate rash 3/16/20 Yes











Physical Exam


Physical Exam





Constitutional: Well developed, well nourished, distress 2/2 pain, diaphoretic. 

[]


HENT: Normocephalic, atraumatic, bilateral external ears normal, oropharynx 

moist, no oral exudates, nose normal. []


Eyes: PERRLA, EOMI, conjunctiva normal, no discharge. [] 


Cardiovascular:Heart rate regular rhythm, no murmur []


Lungs & Thorax:  Bilateral breath sounds clear to auscultation []


Abdomen: Bowel sounds decreased, soft, TTP throughout, no masses, no pulsatile 

masses. [] 


Skin: Warm, dry, no erythema, no rash. [] 


Back: No tenderness, no CVA tenderness. [] 


Extremities: No tenderness, no edema. [] 


Neurologic: Alert and oriented X 3, no focal deficits noted. []


Psychologic: Affect normal, judgement normal, mood normal. []





Current Patient Data


Vital Signs





                                   Vital Signs








  Date Time  Temp Pulse Resp B/P (MAP) Pulse Ox O2 Delivery O2 Flow Rate FiO2


 


3/16/20 03:55   16  100 Room Air  


 


3/16/20 02:25 97.6 63  106/57 (73)    





 97.6       








Lab Values





                                Laboratory Tests








Test


 3/16/20


03:40


 


White Blood Count


 17.7 x10^3/uL


(4.0-11.0)  H


 


Red Blood Count


 4.55 x10^6/uL


(3.50-5.40)


 


Hemoglobin


 15.2 g/dL


(12.0-15.5)


 


Hematocrit


 44.3 %


(36.0-47.0)


 


Mean Corpuscular Volume


 98 fL ()





 


Mean Corpuscular Hemoglobin 33 pg (25-35)  


 


Mean Corpuscular Hemoglobin


Concent 34 g/dL


(31-37)


 


Red Cell Distribution Width


 12.9 %


(11.5-14.5)


 


Platelet Count


 294 x10^3/uL


(140-400)


 


Neutrophils (%) (Auto) 89 % (31-73)  H


 


Lymphocytes (%) (Auto) 7 % (24-48)  L


 


Monocytes (%) (Auto) 4 % (0-9)  


 


Eosinophils (%) (Auto) 0 % (0-3)  


 


Basophils (%) (Auto) 0 % (0-3)  


 


Neutrophils # (Auto)


 15.7 x10^3/uL


(1.8-7.7)  H


 


Lymphocytes # (Auto)


 1.3 x10^3/uL


(1.0-4.8)


 


Monocytes # (Auto)


 0.7 x10^3/uL


(0.0-1.1)


 


Eosinophils # (Auto)


 0.0 x10^3/uL


(0.0-0.7)


 


Basophils # (Auto)


 0.0 x10^3/uL


(0.0-0.2)


 


Platelet Estimate Pending  


 


Sodium Level


 138 mmol/L


(136-145)


 


Potassium Level


 4.3 mmol/L


(3.5-5.1)


 


Chloride Level


 105 mmol/L


()


 


Carbon Dioxide Level


 27 mmol/L


(21-32)


 


Anion Gap 6 (6-14)  


 


Blood Urea Nitrogen


 16 mg/dL


(7-20)


 


Creatinine


 1.2 mg/dL


(0.6-1.0)  H


 


Estimated GFR


(Cockcroft-Gault) 47.7  





 


BUN/Creatinine Ratio 13 (6-20)  


 


Glucose Level


 196 mg/dL


(70-99)  H


 


Calcium Level


 8.9 mg/dL


(8.5-10.1)


 


Total Bilirubin


 1.4 mg/dL


(0.2-1.0)  H


 


Aspartate Amino Transferase


(AST) 401 U/L


(15-37)  H


 


Alanine Aminotransferase (ALT)


 249 U/L


(14-59)  H


 


Alkaline Phosphatase


 87 U/L


()


 


Total Protein


 6.0 g/dL


(6.4-8.2)  L


 


Albumin


 3.4 g/dL


(3.4-5.0)


 


Albumin/Globulin Ratio 1.3 (1.0-1.7)  


 


Lipase


 59126 U/L


()  H





                                Laboratory Tests


3/16/20 03:40








                                Laboratory Tests


3/16/20 03:40











EKG


EKG


[]





Radiology/Procedures


Radiology/Procedures


[facility]


                    [address] [place] [address]


                                 [phone]


                                        


                                 IMAGING REPORT





                                     Signed





PATIENT: JESENIA RICH  ACCOUNT: [id number]     MRN#: [id number]


: 1970           LOCATION: ER              AGE: 49


SEX: F                    EXAM DT: 20         ACCESSION#: [id number]


STATUS: PRE ER            ORD. PHYSICIAN: KEVIN JONES MD


REASON: abdominal pain straight to back, hypotension, distention


PROCEDURE: CT ANGIO CHEST ABD PELVIS





CT angiogram of the chest, abdomen and pelvis:


 


Reason for examination: Abdominal pain straight to back. Hypotension and 


abdominal distention.


 


Helical images were obtained through the chest, abdomen and pelvis with 


intravenous administration of 90 cc Omnipaque 350 using angiographic 


protocol. 3-D MIPS reconstruction was performed in sagittal and coronal 


planes.


 


Exposure: One or more of the following individualized dose reduction 


techniques were utilized for this examination:  1. Automated exposure 


control  2. Adjustment of the mA and/or kV according to patient size  3. 


Use of iterative reconstruction technique.


 


No abnormality seen at the thyroid gland. The trachea and mainstem bronchi


show no intraluminal lesions. No abnormality seen at the esophagus. There 


is a small hiatal hernia. The thoracic aorta shows no aneurysmal 


dilatation or dissection. The heart size is normal with no pericardial 


effusion. Lung fields show some dependent atelectasis but no consolidated 


infiltrates, nodules or pleural effusions are seen. No acute bony 


abnormalities are seen in the thorax. Bilateral breast implants appear to 


be present and appear to be intact.


 


The liver contains a small cystic lesion measuring 1.5 cm in size. No 


abnormality is seen at the spleen, or adrenal glands. Gallbladder shows 


cholelithiasis. The pancreas shows enlarged pancreatic head and there is 


fluid and inflammatory type changes in the peripancreatic region 


consistent with pancreatitis. The abdominal aorta shows no aneurysmal 


dilatation or dissection. No abnormality seen at the inferior vena cava. 


The kidneys show no renal masses, renal calculi, hydronephrosis or 


evidence of obstructive uropathy. No abnormality seen in the stomach or 


duodenum. The small intestinal tract shows some mild dilatation proximally


but no small bowel obstruction is seen. There are a few diverticuli in the


sigmoid colon but no evidence of diverticulitis or colitis. No abnormality


seen at the appendix.


 


The bladder is not distended. The uterus shows a small 1.4 cm hyperdensity


posteriorly which may represent a fibroid. There is some tubular 


enhancement in the left adnexa which may reflect enhancement of the 


fallopian tube and could reflect salpingitis. No free fluid is seen in the


pelvis. No free air is seen in the abdomen or pelvis.


 


IMPRESSION:


 


Small hiatal hernia. No aneurysm or dissection involving the thoracic or 


abdominal aorta. 1.5 cm cystic lesion in the liver. Prominent pancreatic 


head with peripancreatic fluid and inflammatory changes around the 


pancreas consistent with pancreatitis. Cholelithiasis. Tubular enhancement


in the left adnexa possibly reflecting salpingitis. 1.4 cm hypodensity 


within the posterior uterus possibly representing a fibroid.


 


Electronically signed by: Machelle Up MD (3/16/2020 3:36 AM) [id number]














DICTATED and SIGNED BY:     MACHELLE UP MD


DATE:     20 033


[]





                            [address] [address]


                                 [phone]


                                        


                                 IMAGING REPORT





                                     Signed





PATIENT: JESENIA RICH  ACCOUNT: [id number]     MRN#: [id number]


: 1970           LOCATION: ER              AGE: 49


SEX: F                    EXAM DT: 20         ACCESSION#: [id number]


STATUS: PRE ER            ORD. PHYSICIAN: KEVIN JONES MD


REASON: abnormal CT, cholelithiasis


PROCEDURE: ABDOMEN LTD





Abdominal ultrasound Limited:


 


Reason for examination: Abnormal CT with cholelithiasis.


 


Evaluation of the pancreas is limited due to bowel gas. Pancreatic head 


however appears enlarged. The abdominal aorta and inferior vena cava are 


poorly visualized. The liver is normal in size but shows fatty 


infiltration without a focal lesion identified. Gallbladder shows 


cholelithiasis with wall thickening at 4.2 mm but no pericholecystic fluid


is seen. Common bile duct is normal in caliber at 5.6 mm. Right kidney 


measures 11.9 x 6.2 x 4.3 cm in greatest dimension and shows normal 


cortical medullary differentiation with no mass or hydronephrosis seen.


 


IMPRESSION:


 


Prominent pancreatic head. Cholelithiasis with wall thickening.


 


Electronically signed by: Machelle Up MD (3/16/2020 4:37 AM) [id number]














DICTATED and SIGNED BY:     MACHELLE UP MD


DATE:     20 0437





                            [id number] [place] [address]


                                 [phone]


                                        


                                 IMAGING REPORT





                                     Signed





PATIENT: JESENIA RICH  ACCOUNT: [id number]     MRN#: [id number]


: 1970           LOCATION: ER              AGE: 49


SEX: F                    EXAM DT: 20         ACCESSION#: [id number]


STATUS: REG ER            ORD. PHYSICIAN: KEVIN JONES MD


REASON: possible left salpingitis


PROCEDURE: PELVIS W/TV





Pelvic ultrasound with transvaginal:


 


Reason for examination: Possible left salpingitis.


 


Transabdominal and transvaginal ultrasound examination of the pelvis was 


performed.


 


Transabdominally, there is poor visualization of the pelvic structures.


 


Transvaginally, the uterus measures 8.7 x 6.0 x 5.8 cm in greatest 


dimension. There is a small 1.3 x 1.1 x 1.7 cm uterine mass consistent 


with a probable fibroid. Endometrium is not abnormally thickened at 11.5 


mm. The left ovary measures 2.2 x 3.3 x 2.7 cm in greatest dimension and 


contains a small 5.2 mm follicle right ovary measures 2.1 x 0.9 x 1.9 cm 


in greatest dimension and contains several small follicles measuring up to


5.3 mm in size. There appears to be normal vascular flow bilaterally in 


the ovaries. There is a small amount of free fluid present in the pelvis. 


No fallopian tube dilatation is seen to suggest salpingitis 


sonographically. There are however some prominent veins present.


 


IMPRESSION:


 


Small uterine lesion probably representing a fibroid measuring 1.7 cm in 


greatest dimension. Small follicles bilaterally in the ovaries. Small 


amount of free fluid in the pelvis. Prominent veins in the left adnexa. No


fallopian tube abnormality apparent.


 


Electronically signed by: Machelle Up MD (3/16/2020 4:55 AM) [id number]














DICTATED and SIGNED BY:     MACHELLE UP MD


DATE:     20 0455





Course & Med Decision Making


Course & Med Decision Making


Pertinent Labs and Imaging studies reviewed. (See chart for details)





[]40-year-old female presents the emergency room complaints of abdominal pain.  

Patient described off and on x2 days worse tonight.  She states is constant, 

described as a squeezing sensation.  She does describe nausea, vomiting.  She 

denies any fever or diarrhea.  Patient denies any abdominal surgical procedures.

  She states is worse with movements, car ride.  Pain initially was upper 

abdomen however now pretty much generalized.  Last bowel movement was today.  

Patient is only past medical history includes hypertension and diabetes.  

Nothing makes her pain better.  Patient denies any shortness of breath.  She 

does state the pain moves into her chest.  Denies any headache or visual 

changes.





Dragon Disclaimer


Dragon Disclaimer


This electronic medical record was generated, in whole or in part, using a voice

 recognition dictation system.





Departure


Departure


Impression:  


   Primary Impression:  


   Acute pancreatitis


   Additional Impression:  


   Cholelithiasis


Disposition:   ADMITTED AS INPATIENT


Admitting Physician:  HIMS


Condition:  IMPROVED


Critical Care Time


 Critical care time was 35 minutes exclusive of procedures.





Problem Qualifiers








   Primary Impression:  


   Acute pancreatitis


   Pancreatitis type:  unspecified pancreatitis type  Acute pancreatitis 

   complication:  unspecified  Qualified Codes:  K85.90 - Acute pancreatitis 

   without necrosis or infection, unspecified


   Additional Impression:  


   Cholelithiasis


   Cholelithiasis location:  gallbladder  Cholecystitis presence:  with naif

   cystitis  Cholecystitis acuity:  acute  Biliary obstruction:  without biliary

    obstruction  Qualified Codes:  K80.00 - Calculus of gallbladder with acute 

   cholecystitis without obstruction








KEVIN JONES MD              Mar 16, 2020 02:40

## 2020-03-16 NOTE — PDOC2
GI CONSULT


Reason For Consult:


pancreatitis





HPI:


HPI:


50 y/o female ill for a couple days w/ upper abdominal pain ("band" radiating to

lower back) and vomiting.  Feels tight.  Has occurred in the past but much less 

severe.  Feels weak and short of breath.





H/o occasional acid reflux - takes Tums PRN.  Rare dysphagia w/ solid foods, 

sometimes needs to cough up.  No hematemesis, hematochezia, melena, diarrhea, 

constipation, or weight loss.  No GB, liver, pancreas, or PUD history.  No 

previous EGD or colonoscopy.  Occasional ibuprofen.





PMH:


PMH:


HTN, borderline HLD and DM


breast augmentation, nasal surgery





FH:


Family History:  No pertinent hx (denies GI cancers)





Social History:


Smoke:  No


ALCOHOL:  occassional


Drugs:  None





ROS:





GEN: Denies fevers, chills, sweats


HEENT: Denies blurred vision, sore throat


CV: Denies chest pain


RESP: Denies shortness of air, cough


GI: Per HPI


: Denies hematuria, dysuria


ENDO: Denies weight changes


NEURO: Denies confusion, dizziness


MSK: Denies weakness, joint pain/swelling


SKIN: Denies jaundice, pruritus





Vitals:


Vitals:





                                   Vital Signs








  Date Time  Temp Pulse Resp B/P (MAP) Pulse Ox O2 Delivery O2 Flow Rate FiO2


 


3/16/20 09:11     99   


 


3/16/20 07:50      Room Air  


 


3/16/20 07:00 97.4 61 16 90/53 (65)    





 97.4       











Labs:


Labs:





Laboratory Tests








Test


 3/16/20


03:40 3/16/20


05:31


 


White Blood Count


 17.7 x10^3/uL


(4.0-11.0) 





 


Red Blood Count


 4.55 x10^6/uL


(3.50-5.40) 





 


Hemoglobin


 15.2 g/dL


(12.0-15.5) 





 


Hematocrit


 44.3 %


(36.0-47.0) 





 


Mean Corpuscular Volume 98 fL ()  


 


Mean Corpuscular Hemoglobin 33 pg (25-35)  


 


Mean Corpuscular Hemoglobin


Concent 34 g/dL


(31-37) 





 


Red Cell Distribution Width


 12.9 %


(11.5-14.5) 





 


Platelet Count


 294 x10^3/uL


(140-400) 





 


Neutrophils (%) (Auto) 89 % (31-73)  


 


Lymphocytes (%) (Auto) 7 % (24-48)  


 


Monocytes (%) (Auto) 4 % (0-9)  


 


Eosinophils (%) (Auto) 0 % (0-3)  


 


Basophils (%) (Auto) 0 % (0-3)  


 


Neutrophils # (Auto)


 15.7 x10^3/uL


(1.8-7.7) 





 


Lymphocytes # (Auto)


 1.3 x10^3/uL


(1.0-4.8) 





 


Monocytes # (Auto)


 0.7 x10^3/uL


(0.0-1.1) 





 


Eosinophils # (Auto)


 0.0 x10^3/uL


(0.0-0.7) 





 


Basophils # (Auto)


 0.0 x10^3/uL


(0.0-0.2) 





 


Segmented Neutrophils % 86 % (35-66)  


 


Band Neutrophils % 3 % (0-9)  


 


Lymphocytes % 6 % (24-48)  


 


Monocytes % 5 % (0-10)  


 


Platelet Estimate


 Adequate


(ADEQUATE) 





 


Sodium Level


 138 mmol/L


(136-145) 





 


Potassium Level


 4.3 mmol/L


(3.5-5.1) 





 


Chloride Level


 105 mmol/L


() 





 


Carbon Dioxide Level


 27 mmol/L


(21-32) 





 


Anion Gap 6 (6-14)  


 


Blood Urea Nitrogen


 16 mg/dL


(7-20) 





 


Creatinine


 1.2 mg/dL


(0.6-1.0) 





 


Estimated GFR


(Cockcroft-Gault) 47.7 


 





 


BUN/Creatinine Ratio 13 (6-20)  


 


Glucose Level


 196 mg/dL


(70-99) 





 


Calcium Level


 8.9 mg/dL


(8.5-10.1) 





 


Total Bilirubin


 1.4 mg/dL


(0.2-1.0) 





 


Aspartate Amino Transf


(AST/SGOT) 401 U/L


(15-37) 





 


Alanine Aminotransferase


(ALT/SGPT) 249 U/L


(14-59) 





 


Alkaline Phosphatase


 87 U/L


() 





 


Total Protein


 6.0 g/dL


(6.4-8.2) 





 


Albumin


 3.4 g/dL


(3.4-5.0) 





 


Albumin/Globulin Ratio 1.3 (1.0-1.7)  


 


Lipase


 26019 U/L


() 





 


Urine Collection Type  Unknown 


 


Urine Color  Yellow 


 


Urine Clarity  Clear 


 


Urine pH  5.5 (<5.0-8.0) 


 


Urine Specific Gravity


 


 >=1.030


(1.000-1.030)


 


Urine Protein


 


 Negative mg/dL


(NEG-TRACE)


 


Urine Glucose (UA)


 


 Negative mg/dL


(NEG)


 


Urine Ketones (Stick)


 


 Negative mg/dL


(NEG)


 


Urine Blood  Negative (NEG) 


 


Urine Nitrite  Negative (NEG) 


 


Urine Bilirubin  Negative (NEG) 


 


Urine Urobilinogen Dipstick


 


 1.0 mg/dL (0.2


mg/dL)


 


Urine Leukocyte Esterase  Negative (NEG) 


 


Urine RBC  0 /HPF (0-2) 


 


Urine WBC  0 /HPF (0-4) 


 


Urine Squamous Epithelial


Cells 


 Few /LPF 





 


Urine Bacteria  0 /HPF (0-FEW) 











Allergies:


Coded Allergies:  


     codeine (Verified  Allergy, Intermediate, rash, 3/16/20)





Medications:





Current Medications








 Medications


  (Trade)  Dose


 Ordered  Sig/Yee


 Route


 PRN Reason  Start Time


 Stop Time Status Last Admin


Dose Admin


 


 Sodium Chloride  1,000 ml @ 


 1,000 mls/hr  Q1H


 IV


   3/16/20 03:00


 3/16/20 03:59 DC 3/16/20 03:00





 


 Ondansetron HCl


  (Zofran)  4 mg  1X  ONCE


 IVP


   3/16/20 03:00


 3/16/20 03:01 DC 3/16/20 03:27





 


 Ketorolac


 Tromethamine


  (Toradol 30mg


 Vial)  30 mg  1X  ONCE


 IV


   3/16/20 03:00


 3/16/20 03:01 DC 3/16/20 02:54





 


 Fentanyl Citrate


  (Fentanyl 2ml


 Vial)  25 mcg  1X  ONCE


 IVP


   3/16/20 03:30


 3/16/20 03:31 DC 3/16/20 03:23





 


 Iohexol


  (Omnipaque 350


 Mg/ml)  90 ml  1X  ONCE


 IV


   3/16/20 03:30


 3/16/20 03:31 DC 3/16/20 03:25





 


 Hydromorphone HCl


  (Dilaudid)  0.5 mg  1X  ONCE


 IV


   3/16/20 04:30


 3/16/20 04:32 DC 3/16/20 03:55





 


 Morphine Sulfate


  (Morphine


 Sulfate)  2 mg  PRN Q2HR  PRN


 IV


 SEVERE PAIN 7-10  3/16/20 05:00


    3/16/20 07:50





 


 Sodium Chloride  1,000 ml @ 


 125 mls/hr  Q8H


 IV


   3/16/20 05:00


 3/17/20 04:59  3/16/20 05:46





 


 Hydromorphone HCl


  (Dilaudid)  0.5 mg  PRN Q3HRS  PRN


 IV


 SEVERE PAIN 7-10  3/16/20 05:00


    3/16/20 09:11





 


 Piperacillin Sod/


 Tazobactam Sod


 4.5 gm/Sodium


 Chloride  100 ml @ 


 200 mls/hr  1X  ONCE


 IV


   3/16/20 06:00


 3/16/20 06:29 DC 3/16/20 05:44














Imaging:


Imaging:


C/A/P CTA


No abnormality seen at the thyroid gland. The trachea and mainstem bronchi show 

no intraluminal lesions. No abnormality seen at the esophagus. There is a small 

hiatal hernia. The thoracic aorta shows no aneurysmal dilatation or dissection. 

The heart size is normal with no pericardial effusion. Lung fields show some 

dependent atelectasis but no consolidated infiltrates, nodules or pleural 

effusions are seen. No acute bony abnormalities are seen in the thorax. 

Bilateral breast implants appear to be present and appear to be intact.


The liver contains a small cystic lesion measuring 1.5 cm in size. No 

abnormality is seen at the spleen, or adrenal glands. Gallbladder shows 

cholelithiasis. The pancreas shows enlarged pancreatic head and there is fluid 

and inflammatory type changes in the peripancreatic region consistent with 

pancreatitis. The abdominal aorta shows no aneurysmal dilatation or dissection. 

No abnormality seen at the inferior vena cava. The kidneys show no renal masses,

renal calculi, hydronephrosis or evidence of obstructive uropathy. No 

abnormality seen in the stomach or duodenum. The small intestinal tract shows 

some mild dilatation proximallybut no small bowel obstruction is seen. There are

a few diverticuli in the sigmoid colon but no evidence of diverticulitis or 

colitis. No abnormality seen at the appendix.


The bladder is not distended. The uterus shows a small 1.4 cm hyperdensity 

posteriorly which may represent a fibroid. There is some tubular enhancement in 

the left adnexa which may reflect enhancement of the fallopian tube and could 

reflect salpingitis. No free fluid is seen in the pelvis. No free air is seen in

the abdomen or pelvis.


IMPRESSION:


Small hiatal hernia. No aneurysm or dissection involving the thoracic or 

abdominal aorta. 1.5 cm cystic lesion in the liver. Prominent pancreatic head 

with peripancreatic fluid and inflammatory changes around the 


pancreas consistent with pancreatitis. Cholelithiasis. Tubular enhancement in 

the left adnexa possibly reflecting salpingitis. 1.4 cm hypodensity within the 

posterior uterus possibly representing a fibroid.





RUQ US


Evaluation of the pancreas is limited due to bowel gas. Pancreatic head however 

appears enlarged. The abdominal aorta and inferior vena cava are poorly 

visualized. The liver is normal in size but shows fatty 


infiltration without a focal lesion identified. Gallbladder shows cholelithiasis

with wall thickening at 4.2 mm but no pericholecystic fluid is seen. Common bile

duct is normal in caliber at 5.6 mm. Right kidney measures 11.9 x 6.2 x 4.3 cm 

in greatest dimension and shows normal cortical medullary differentiation with n

o mass or hydronephrosis seen.


IMPRESSION:


Prominent pancreatic head. Cholelithiasis with wall thickening.





Pelv US


IMPRESSION:


Small uterine lesion probably representing a fibroid measuring 1.7 cm in 

greatest dimension. Small follicles bilaterally in the ovaries. Small amount of 

free fluid in the pelvis. Prominent veins in the left adnexa. No


fallopian tube abnormality apparent.





PE:





GEN: uncomfortable, family present


HEENT: Atraumatic, PERRL


LUNGS: CTAB


HEART: RRR


ABD: quiet, some distention, epigastric to RUQ pain, some tenderness LLQ


EXTREMITY: No edema


SKIN: No rashes, no jaundice


NEURO/PSYCH: A & O 3





A/P:


A/P:


Gallstone pancreatitis, GB wall thickening


Leukocytosis, elevated LFTs - CBD WNL


CRC screen - none


Diverticulosis


Fatty liver, cystic liver lesion


Uterine fibroid





--


Continue support - NPO (ice chips ok w/ GI), IVF, anti-emetics.  Pain control 

per primary.


Await surgery consult re: timing of cholecystectomy, monitor labs.


Empiric acid-reducer.


Outpt screening colonoscopy +/- EGD.











RAGHAVENDRA MURCIA         Mar 16, 2020 09:46

## 2020-03-16 NOTE — PDOC2
SAURAV NASH KIRAN APRN 3/16/20 1205:


CONSULT


Date of Consult


Date of Consult


DATE: 3/16/20 


TIME: 11:59





Reason for Consult


Reason for Consult:


gallstone pancreatitis





Referring Physician


Referring Physician:


ER





Identification/Chief Complaint


Chief Complaint


abdominal pain





Source


Source:  Chart review, Patient





History of Present Illness


Reason for Visit:


abdominal pain, nausea, emesis last couple of days, then yesterday became 

significantly worse.  Pain is diffuse, radiates to back..  No constipation or 

diarrhea.  Clammy at home No hx of pancreatitis





Past Medical History


Cardiovascular:  HTN, Hyperlipidemia


Endocrine:  Diabetes





Past Surgical History


Past Surgical History:  No pertinent history





Family History


Family History:  Other (noncontributory to current illness)





Social History


No


ALCOHOL:  occassional


Drugs:  None





Current Problem List


Problem List


Problems


Medical Problems:


(1) Acute pancreatitis


Status: Acute  





(2) Cholelithiasis


Status: Acute  











Current Medications


Current Medications





Current Medications


Sodium Chloride 1,000 ml @  1,000 mls/hr Q1H IV  Last administered on 3/16/20at 

03:00;  Start 3/16/20 at 03:00;  Stop 3/16/20 at 03:59;  Status DC


Ondansetron HCl (Zofran) 4 mg 1X  ONCE IVP  Last administered on 3/16/20at 

03:27;  Start 3/16/20 at 03:00;  Stop 3/16/20 at 03:01;  Status DC


Morphine Sulfate (Morphine Sulfate) 4 mg 1X  ONCE IV ;  Start 3/16/20 at 03:00; 

Stop 3/16/20 at 03:01;  Status Cancel


Ketorolac Tromethamine (Toradol 30mg Vial) 30 mg 1X  ONCE IV  Last administered 

on 3/16/20at 02:54;  Start 3/16/20 at 03:00;  Stop 3/16/20 at 03:01;  Status DC


Fentanyl Citrate (Fentanyl 2ml Vial) 25 mcg 1X  ONCE IVP  Last administered on 

3/16/20at 03:23;  Start 3/16/20 at 03:30;  Stop 3/16/20 at 03:31;  Status DC


Fentanyl Citrate (Fentanyl 2ml Vial) 100 mcg STK-MED ONCE .ROUTE ;  Start 

3/16/20 at 03:18;  Stop 3/16/20 at 03:18;  Status DC


Iohexol (Omnipaque 350 Mg/ml) 90 ml 1X  ONCE IV  Last administered on 3/16/20at 

03:25;  Start 3/16/20 at 03:30;  Stop 3/16/20 at 03:31;  Status DC


Info (CONTRAST GIVEN -- Rx MONITORING) 1 each PRN DAILY  PRN MC SEE COMMENTS;  

Start 3/16/20 at 03:30;  Stop 3/18/20 at 03:29


Hydromorphone HCl (Dilaudid) 0.5 mg 1X  ONCE IV  Last administered on 3/16/20at 

03:55;  Start 3/16/20 at 04:30;  Stop 3/16/20 at 04:32;  Status DC


Ondansetron HCl (Zofran) 4 mg PRN Q8HRS  PRN IV NAUSEA/VOMITING 1ST CHOICE;  

Start 3/16/20 at 05:00;  Stop 3/16/20 at 09:27;  Status DC


Morphine Sulfate (Morphine Sulfate) 2 mg PRN Q2HR  PRN IV SEVERE PAIN 7-10 Last 

administered on 3/16/20at 10:10;  Start 3/16/20 at 05:00


Sodium Chloride 1,000 ml @  125 mls/hr Q8H IV  Last administered on 3/16/20at 

05:46;  Start 3/16/20 at 05:00;  Stop 3/17/20 at 04:59


Hydromorphone HCl (Dilaudid) 0.5 mg PRN Q3HRS  PRN IV SEVERE PAIN 7-10 Last 

administered on 3/16/20at 09:11;  Start 3/16/20 at 05:00


Piperacillin Sod/ Tazobactam Sod 4.5 gm/Sodium Chloride 100 ml @  200 mls/hr 1X 

ONCE IV  Last administered on 3/16/20at 05:44;  Start 3/16/20 at 06:00;  Stop 

3/16/20 at 06:29;  Status DC


Ondansetron HCl (Zofran) 4 mg PRN Q4HRS  PRN IV NAUSEA/VOMITING 1ST CHOICE;  

Start 3/16/20 at 09:30


Insulin Human Lispro (HumaLOG) 0-9 UNITS Q6HRS SQ ;  Start 3/16/20 at 09:30


Dextrose (Dextrose 50%-Water Syringe) 12.5 gm PRN Q15MIN  PRN IV SEE COMMENTS;  

Start 3/16/20 at 09:30


Pantoprazole Sodium (PROTONIX VIAL for IV PUSH) 40 mg DAILYAC IVP ;  Start 

3/16/20 at 11:30





Allergies


Allergies:  


Coded Allergies:  


     codeine (Verified  Allergy, Intermediate, rash, 3/16/20)





ROS


General:  YES: Fatigue; 


   No: Chills


PSYCHOLOGICAL ROS:  No: Anxiety, Depression


Eyes:  No Blurry vision, No Loss of vision


HEENT:  No: Heacaches, Sore Throat


Hematological and Lymphatic:  No: Bleeding Problems, Blood Clots


Respiratory:  YES: Shortness of breath; 


   No: Cough


Cardiovascular:  No Chest Pain, No Palpitations


Gastrointestinal:  Yes Other (see hpi)


Genitourinary:  YES Dysuria, YES Hematuria


Musculoskeletal:  No Joint Pain, No Muscle Pain


Neurological:  No Impaired Coord/balance, No Numbness/Tingling


Skin:  No Pruritus, No Rash





Physical Exam


General:  Alert, Oriented X3, Cooperative, No acute distress


HEENT:  PERRLA, Mucous membr. moist/pink


Lungs:  Clear to auscultation, Normal air movement


Heart:  Regular rate, Normal S1, Normal S2, No murmurs


Abdomen:  Soft, Other (moderate epigastric TTP, mild across lower abdomen )


Extremities:  No clubbing, No cyanosis


Skin:  No rashes


Neuro:  Normal gait, Normal speech


Psych/Mental Status:  Mental status NL, Mood NL


MUSCULOSKELETAL:  No deformity, No swelling





Vitals


VITALS





Vital Signs








  Date Time  Temp Pulse Resp B/P (MAP) Pulse Ox O2 Delivery O2 Flow Rate FiO2


 


3/16/20 11:00 97.8 77 16 160/68 (98) 96 Nasal Cannula  





 97.8       











Labs


Labs





Laboratory Tests








Test


 3/16/20


03:40 3/16/20


05:31


 


White Blood Count


 17.7 x10^3/uL


(4.0-11.0) 





 


Red Blood Count


 4.55 x10^6/uL


(3.50-5.40) 





 


Hemoglobin


 15.2 g/dL


(12.0-15.5) 





 


Hematocrit


 44.3 %


(36.0-47.0) 





 


Mean Corpuscular Volume 98 fL ()  


 


Mean Corpuscular Hemoglobin 33 pg (25-35)  


 


Mean Corpuscular Hemoglobin


Concent 34 g/dL


(31-37) 





 


Red Cell Distribution Width


 12.9 %


(11.5-14.5) 





 


Platelet Count


 294 x10^3/uL


(140-400) 





 


Neutrophils (%) (Auto) 89 % (31-73)  


 


Lymphocytes (%) (Auto) 7 % (24-48)  


 


Monocytes (%) (Auto) 4 % (0-9)  


 


Eosinophils (%) (Auto) 0 % (0-3)  


 


Basophils (%) (Auto) 0 % (0-3)  


 


Neutrophils # (Auto)


 15.7 x10^3/uL


(1.8-7.7) 





 


Lymphocytes # (Auto)


 1.3 x10^3/uL


(1.0-4.8) 





 


Monocytes # (Auto)


 0.7 x10^3/uL


(0.0-1.1) 





 


Eosinophils # (Auto)


 0.0 x10^3/uL


(0.0-0.7) 





 


Basophils # (Auto)


 0.0 x10^3/uL


(0.0-0.2) 





 


Segmented Neutrophils % 86 % (35-66)  


 


Band Neutrophils % 3 % (0-9)  


 


Lymphocytes % 6 % (24-48)  


 


Monocytes % 5 % (0-10)  


 


Platelet Estimate


 Adequate


(ADEQUATE) 





 


Sodium Level


 138 mmol/L


(136-145) 





 


Potassium Level


 4.3 mmol/L


(3.5-5.1) 





 


Chloride Level


 105 mmol/L


() 





 


Carbon Dioxide Level


 27 mmol/L


(21-32) 





 


Anion Gap 6 (6-14)  


 


Blood Urea Nitrogen


 16 mg/dL


(7-20) 





 


Creatinine


 1.2 mg/dL


(0.6-1.0) 





 


Estimated GFR


(Cockcroft-Gault) 47.7 


 





 


BUN/Creatinine Ratio 13 (6-20)  


 


Glucose Level


 196 mg/dL


(70-99) 





 


Calcium Level


 8.9 mg/dL


(8.5-10.1) 





 


Total Bilirubin


 1.4 mg/dL


(0.2-1.0) 





 


Aspartate Amino Transf


(AST/SGOT) 401 U/L


(15-37) 





 


Alanine Aminotransferase


(ALT/SGPT) 249 U/L


(14-59) 





 


Alkaline Phosphatase


 87 U/L


() 





 


Total Protein


 6.0 g/dL


(6.4-8.2) 





 


Albumin


 3.4 g/dL


(3.4-5.0) 





 


Albumin/Globulin Ratio 1.3 (1.0-1.7)  


 


Lipase


 95395 U/L


() 





 


Urine Collection Type  Unknown 


 


Urine Color  Yellow 


 


Urine Clarity  Clear 


 


Urine pH  5.5 (<5.0-8.0) 


 


Urine Specific Gravity


 


 >=1.030


(1.000-1.030)


 


Urine Protein


 


 Negative mg/dL


(NEG-TRACE)


 


Urine Glucose (UA)


 


 Negative mg/dL


(NEG)


 


Urine Ketones (Stick)


 


 Negative mg/dL


(NEG)


 


Urine Blood  Negative (NEG) 


 


Urine Nitrite  Negative (NEG) 


 


Urine Bilirubin  Negative (NEG) 


 


Urine Urobilinogen Dipstick


 


 1.0 mg/dL (0.2


mg/dL)


 


Urine Leukocyte Esterase  Negative (NEG) 


 


Urine RBC  0 /HPF (0-2) 


 


Urine WBC  0 /HPF (0-4) 


 


Urine Squamous Epithelial


Cells 


 Few /LPF 





 


Urine Bacteria  0 /HPF (0-FEW) 








Laboratory Tests








Test


 3/16/20


03:40 3/16/20


05:31


 


White Blood Count


 17.7 x10^3/uL


(4.0-11.0) 





 


Red Blood Count


 4.55 x10^6/uL


(3.50-5.40) 





 


Hemoglobin


 15.2 g/dL


(12.0-15.5) 





 


Hematocrit


 44.3 %


(36.0-47.0) 





 


Mean Corpuscular Volume 98 fL ()  


 


Mean Corpuscular Hemoglobin 33 pg (25-35)  


 


Mean Corpuscular Hemoglobin


Concent 34 g/dL


(31-37) 





 


Red Cell Distribution Width


 12.9 %


(11.5-14.5) 





 


Platelet Count


 294 x10^3/uL


(140-400) 





 


Neutrophils (%) (Auto) 89 % (31-73)  


 


Lymphocytes (%) (Auto) 7 % (24-48)  


 


Monocytes (%) (Auto) 4 % (0-9)  


 


Eosinophils (%) (Auto) 0 % (0-3)  


 


Basophils (%) (Auto) 0 % (0-3)  


 


Neutrophils # (Auto)


 15.7 x10^3/uL


(1.8-7.7) 





 


Lymphocytes # (Auto)


 1.3 x10^3/uL


(1.0-4.8) 





 


Monocytes # (Auto)


 0.7 x10^3/uL


(0.0-1.1) 





 


Eosinophils # (Auto)


 0.0 x10^3/uL


(0.0-0.7) 





 


Basophils # (Auto)


 0.0 x10^3/uL


(0.0-0.2) 





 


Segmented Neutrophils % 86 % (35-66)  


 


Band Neutrophils % 3 % (0-9)  


 


Lymphocytes % 6 % (24-48)  


 


Monocytes % 5 % (0-10)  


 


Platelet Estimate


 Adequate


(ADEQUATE) 





 


Sodium Level


 138 mmol/L


(136-145) 





 


Potassium Level


 4.3 mmol/L


(3.5-5.1) 





 


Chloride Level


 105 mmol/L


() 





 


Carbon Dioxide Level


 27 mmol/L


(21-32) 





 


Anion Gap 6 (6-14)  


 


Blood Urea Nitrogen


 16 mg/dL


(7-20) 





 


Creatinine


 1.2 mg/dL


(0.6-1.0) 





 


Estimated GFR


(Cockcroft-Gault) 47.7 


 





 


BUN/Creatinine Ratio 13 (6-20)  


 


Glucose Level


 196 mg/dL


(70-99) 





 


Calcium Level


 8.9 mg/dL


(8.5-10.1) 





 


Total Bilirubin


 1.4 mg/dL


(0.2-1.0) 





 


Aspartate Amino Transf


(AST/SGOT) 401 U/L


(15-37) 





 


Alanine Aminotransferase


(ALT/SGPT) 249 U/L


(14-59) 





 


Alkaline Phosphatase


 87 U/L


() 





 


Total Protein


 6.0 g/dL


(6.4-8.2) 





 


Albumin


 3.4 g/dL


(3.4-5.0) 





 


Albumin/Globulin Ratio 1.3 (1.0-1.7)  


 


Lipase


 76556 U/L


() 





 


Urine Collection Type  Unknown 


 


Urine Color  Yellow 


 


Urine Clarity  Clear 


 


Urine pH  5.5 (<5.0-8.0) 


 


Urine Specific Gravity


 


 >=1.030


(1.000-1.030)


 


Urine Protein


 


 Negative mg/dL


(NEG-TRACE)


 


Urine Glucose (UA)


 


 Negative mg/dL


(NEG)


 


Urine Ketones (Stick)


 


 Negative mg/dL


(NEG)


 


Urine Blood  Negative (NEG) 


 


Urine Nitrite  Negative (NEG) 


 


Urine Bilirubin  Negative (NEG) 


 


Urine Urobilinogen Dipstick


 


 1.0 mg/dL (0.2


mg/dL)


 


Urine Leukocyte Esterase  Negative (NEG) 


 


Urine RBC  0 /HPF (0-2) 


 


Urine WBC  0 /HPF (0-4) 


 


Urine Squamous Epithelial


Cells 


 Few /LPF 





 


Urine Bacteria  0 /HPF (0-FEW) 











Assessment/Plan


Assessment/Plan


gallstone pancreatitis


allow resolution of pancreatitis prior to naif





KIEL BAL MD 3/16/20 1215:


CONSULT


Assessment/Plan


Assessment/Plan


pt seen, interviewed and examined


agree with above


will follow for timing of l/s naif





Thanks for consult











SAURAV NASH            Mar 16, 2020 12:05


KIEL BAL MD                Mar 16, 2020 12:15

## 2020-03-16 NOTE — RAD
Abdominal ultrasound Limited:

 

Reason for examination: Abnormal CT with cholelithiasis.

 

Evaluation of the pancreas is limited due to bowel gas. Pancreatic head 

however appears enlarged. The abdominal aorta and inferior vena cava are 

poorly visualized. The liver is normal in size but shows fatty 

infiltration without a focal lesion identified. Gallbladder shows 

cholelithiasis with wall thickening at 4.2 mm but no pericholecystic fluid

is seen. Common bile duct is normal in caliber at 5.6 mm. Right kidney 

measures 11.9 x 6.2 x 4.3 cm in greatest dimension and shows normal 

cortical medullary differentiation with no mass or hydronephrosis seen.

 

IMPRESSION:

 

Prominent pancreatic head. Cholelithiasis with wall thickening.

 

Electronically signed by: Ladan Mckenna MD (3/16/2020 4:37 AM) [id number]

## 2020-03-16 NOTE — RAD
Pelvic ultrasound with transvaginal:

 

Reason for examination: Possible left salpingitis.

 

Transabdominal and transvaginal ultrasound examination of the pelvis was 

performed.

 

Transabdominally, there is poor visualization of the pelvic structures.

 

Transvaginally, the uterus measures 8.7 x 6.0 x 5.8 cm in greatest 

dimension. There is a small 1.3 x 1.1 x 1.7 cm uterine mass consistent 

with a probable fibroid. Endometrium is not abnormally thickened at 11.5 

mm. The left ovary measures 2.2 x 3.3 x 2.7 cm in greatest dimension and 

contains a small 5.2 mm follicle right ovary measures 2.1 x 0.9 x 1.9 cm 

in greatest dimension and contains several small follicles measuring up to

5.3 mm in size. There appears to be normal vascular flow bilaterally in 

the ovaries. There is a small amount of free fluid present in the pelvis. 

No fallopian tube dilatation is seen to suggest salpingitis 

sonographically. There are however some prominent veins present.

 

IMPRESSION:

 

Small uterine lesion probably representing a fibroid measuring 1.7 cm in 

greatest dimension. Small follicles bilaterally in the ovaries. Small 

amount of free fluid in the pelvis. Prominent veins in the left adnexa. No

fallopian tube abnormality apparent.

 

Electronically signed by: Ladan Mckenna MD (3/16/2020 4:55 AM) [id number]

## 2020-03-17 VITALS — DIASTOLIC BLOOD PRESSURE: 67 MMHG | SYSTOLIC BLOOD PRESSURE: 108 MMHG

## 2020-03-17 VITALS — SYSTOLIC BLOOD PRESSURE: 107 MMHG | DIASTOLIC BLOOD PRESSURE: 57 MMHG

## 2020-03-17 VITALS — SYSTOLIC BLOOD PRESSURE: 140 MMHG | DIASTOLIC BLOOD PRESSURE: 75 MMHG

## 2020-03-17 VITALS — DIASTOLIC BLOOD PRESSURE: 72 MMHG | SYSTOLIC BLOOD PRESSURE: 128 MMHG

## 2020-03-17 VITALS — DIASTOLIC BLOOD PRESSURE: 70 MMHG | SYSTOLIC BLOOD PRESSURE: 123 MMHG

## 2020-03-17 VITALS — SYSTOLIC BLOOD PRESSURE: 119 MMHG | DIASTOLIC BLOOD PRESSURE: 80 MMHG

## 2020-03-17 VITALS — SYSTOLIC BLOOD PRESSURE: 115 MMHG | DIASTOLIC BLOOD PRESSURE: 71 MMHG

## 2020-03-17 VITALS — SYSTOLIC BLOOD PRESSURE: 108 MMHG | DIASTOLIC BLOOD PRESSURE: 76 MMHG

## 2020-03-17 VITALS — DIASTOLIC BLOOD PRESSURE: 79 MMHG | SYSTOLIC BLOOD PRESSURE: 125 MMHG

## 2020-03-17 VITALS — SYSTOLIC BLOOD PRESSURE: 110 MMHG | DIASTOLIC BLOOD PRESSURE: 65 MMHG

## 2020-03-17 VITALS — SYSTOLIC BLOOD PRESSURE: 125 MMHG | DIASTOLIC BLOOD PRESSURE: 70 MMHG

## 2020-03-17 LAB
ALBUMIN SERPL-MCNC: 2.8 G/DL (ref 3.4–5)
ALBUMIN/GLOB SERPL: 1 {RATIO} (ref 1–1.7)
ALP SERPL-CCNC: 75 U/L (ref 46–116)
ALT SERPL-CCNC: 263 U/L (ref 14–59)
ANION GAP SERPL CALC-SCNC: 13 MMOL/L (ref 6–14)
AST SERPL-CCNC: 214 U/L (ref 15–37)
BILIRUB SERPL-MCNC: 1.9 MG/DL (ref 0.2–1)
BUN SERPL-MCNC: 56 MG/DL (ref 7–20)
BUN/CREAT SERPL: 11 (ref 6–20)
CALCIUM SERPL-MCNC: < 5 MG/DL (ref 8.5–10.1)
CHLORIDE SERPL-SCNC: 109 MMOL/L (ref 98–107)
CO2 SERPL-SCNC: 17 MMOL/L (ref 21–32)
CREAT SERPL-MCNC: 5 MG/DL (ref 0.6–1)
ERYTHROCYTE [DISTWIDTH] IN BLOOD BY AUTOMATED COUNT: 13.8 % (ref 11.5–14.5)
GFR SERPLBLD BASED ON 1.73 SQ M-ARVRAT: 9.2 ML/MIN
GLOBULIN SER-MCNC: 2.8 G/DL (ref 2.2–3.8)
GLUCOSE SERPL-MCNC: 196 MG/DL (ref 70–99)
HCT VFR BLD CALC: 42 % (ref 36–47)
HGB BLD-MCNC: 13.8 G/DL (ref 12–15.5)
MAGNESIUM SERPL-MCNC: 1.2 MG/DL (ref 1.8–2.4)
MCH RBC QN AUTO: 34 PG (ref 25–35)
MCHC RBC AUTO-ENTMCNC: 33 G/DL (ref 31–37)
MCV RBC AUTO: 102 FL (ref 79–100)
PHOSPHATE SERPL-MCNC: 1.2 MG/DL (ref 2.6–4.7)
PLATELET # BLD AUTO: 212 X10^3/UL (ref 140–400)
POTASSIUM SERPL-SCNC: 6.8 MMOL/L (ref 3.5–5.1)
PROT SERPL-MCNC: 5.6 G/DL (ref 6.4–8.2)
RBC # BLD AUTO: 4.12 X10^6/UL (ref 3.5–5.4)
SODIUM SERPL-SCNC: 139 MMOL/L (ref 136–145)
WBC # BLD AUTO: 22.7 X10^3/UL (ref 4–11)

## 2020-03-17 PROCEDURE — B548ZZA ULTRASONOGRAPHY OF SUPERIOR VENA CAVA, GUIDANCE: ICD-10-PCS | Performed by: RADIOLOGY

## 2020-03-17 PROCEDURE — 02HV33Z INSERTION OF INFUSION DEVICE INTO SUPERIOR VENA CAVA, PERCUTANEOUS APPROACH: ICD-10-PCS | Performed by: RADIOLOGY

## 2020-03-17 PROCEDURE — B5181ZA FLUOROSCOPY OF SUPERIOR VENA CAVA USING LOW OSMOLAR CONTRAST, GUIDANCE: ICD-10-PCS | Performed by: RADIOLOGY

## 2020-03-17 RX ADMIN — MORPHINE SULFATE PRN MG: 2 INJECTION, SOLUTION INTRAMUSCULAR; INTRAVENOUS at 12:26

## 2020-03-17 RX ADMIN — MORPHINE SULFATE PRN MG: 2 INJECTION, SOLUTION INTRAMUSCULAR; INTRAVENOUS at 08:55

## 2020-03-17 RX ADMIN — MORPHINE SULFATE PRN MG: 2 INJECTION, SOLUTION INTRAMUSCULAR; INTRAVENOUS at 03:29

## 2020-03-17 RX ADMIN — HYDROMORPHONE HYDROCHLORIDE PRN MG: 2 INJECTION INTRAMUSCULAR; INTRAVENOUS; SUBCUTANEOUS at 10:06

## 2020-03-17 RX ADMIN — ONDANSETRON PRN MG: 2 INJECTION INTRAMUSCULAR; INTRAVENOUS at 13:59

## 2020-03-17 RX ADMIN — BACITRACIN SCH MLS/HR: 5000 INJECTION, POWDER, FOR SOLUTION INTRAMUSCULAR at 21:04

## 2020-03-17 RX ADMIN — INSULIN LISPRO SCH UNITS: 100 INJECTION, SOLUTION INTRAVENOUS; SUBCUTANEOUS at 05:48

## 2020-03-17 RX ADMIN — METOPROLOL TARTRATE SCH MG: 5 INJECTION INTRAVENOUS at 00:42

## 2020-03-17 RX ADMIN — PANTOPRAZOLE SODIUM SCH MG: 40 INJECTION, POWDER, FOR SOLUTION INTRAVENOUS at 08:54

## 2020-03-17 RX ADMIN — MORPHINE SULFATE PRN MG: 2 INJECTION, SOLUTION INTRAMUSCULAR; INTRAVENOUS at 00:43

## 2020-03-17 RX ADMIN — AMIODARONE HYDROCHLORIDE PRN MLS/HR: 50 INJECTION, SOLUTION INTRAVENOUS at 15:24

## 2020-03-17 RX ADMIN — ONDANSETRON PRN MG: 2 INJECTION INTRAMUSCULAR; INTRAVENOUS at 03:29

## 2020-03-17 RX ADMIN — HYDROMORPHONE HYDROCHLORIDE PRN MG: 2 INJECTION INTRAMUSCULAR; INTRAVENOUS; SUBCUTANEOUS at 22:12

## 2020-03-17 RX ADMIN — METOPROLOL TARTRATE SCH MG: 5 INJECTION INTRAVENOUS at 05:51

## 2020-03-17 RX ADMIN — MEROPENEM SCH MLS/HR: 1 INJECTION, POWDER, FOR SOLUTION INTRAVENOUS at 21:04

## 2020-03-17 RX ADMIN — MORPHINE SULFATE PRN MG: 2 INJECTION, SOLUTION INTRAMUSCULAR; INTRAVENOUS at 05:55

## 2020-03-17 RX ADMIN — INSULIN LISPRO SCH UNITS: 100 INJECTION, SOLUTION INTRAVENOUS; SUBCUTANEOUS at 18:05

## 2020-03-17 RX ADMIN — METOPROLOL TARTRATE SCH MG: 5 INJECTION INTRAVENOUS at 18:00

## 2020-03-17 RX ADMIN — METOPROLOL TARTRATE SCH MG: 5 INJECTION INTRAVENOUS at 10:09

## 2020-03-17 RX ADMIN — INSULIN LISPRO SCH UNITS: 100 INJECTION, SOLUTION INTRAVENOUS; SUBCUTANEOUS at 12:33

## 2020-03-17 RX ADMIN — METOPROLOL TARTRATE SCH MG: 5 INJECTION INTRAVENOUS at 12:00

## 2020-03-17 RX ADMIN — HYDROMORPHONE HYDROCHLORIDE PRN MG: 2 INJECTION INTRAMUSCULAR; INTRAVENOUS; SUBCUTANEOUS at 15:33

## 2020-03-17 RX ADMIN — HYDROMORPHONE HYDROCHLORIDE PRN MG: 2 INJECTION INTRAMUSCULAR; INTRAVENOUS; SUBCUTANEOUS at 18:04

## 2020-03-17 RX ADMIN — BACITRACIN SCH MLS/HR: 5000 INJECTION, POWDER, FOR SOLUTION INTRAMUSCULAR at 17:42

## 2020-03-17 RX ADMIN — ONDANSETRON PRN MG: 2 INJECTION INTRAMUSCULAR; INTRAVENOUS at 15:04

## 2020-03-17 RX ADMIN — INSULIN LISPRO SCH UNITS: 100 INJECTION, SOLUTION INTRAVENOUS; SUBCUTANEOUS at 00:52

## 2020-03-17 RX ADMIN — ONDANSETRON PRN MG: 2 INJECTION INTRAMUSCULAR; INTRAVENOUS at 22:51

## 2020-03-17 RX ADMIN — PROCHLORPERAZINE EDISYLATE PRN MG: 5 INJECTION INTRAMUSCULAR; INTRAVENOUS at 00:42

## 2020-03-17 NOTE — RAD
INDICATION: Hypoxia with pancreatitis

 

COMPARISON: One day prior

 

FINDINGS:

 

Single view of chest obtained.

Hypoexpanded examination of the lungs. Interstitial opacities bilaterally 

with more focal opacities at the lung bases. Cardiac silhouette is 

enlarged. Right-sided PICC line with tip at the expected location of the 

SVC.

 

 

IMPRESSION:

 

*  Hypoexpanded examination of the lungs with opacities at lung bases 

which could be from development of pleural effusion and airspace 

consolidation. There is also interstitial opacities bilaterally which 

could be seen with edema or interstitial infiltrate.

 

Electronically signed by: Keegan Stovall MD (3/17/2020 5:37 PM) 

[id number]

## 2020-03-17 NOTE — RAD
Exam: Fluoroscopic and ultrasound guided right percutaneous inserted central

venous catheter placement 3/17/2020 2:05 PM



.Indication: Poor perihperal access, unable to get lab draws, pancreatitis

cholecystitis



Technique: Informed oral and written consent were obtained. The right upper

extremity was prepped and draped using sterile barrier technique. All elements

of maximal sterile barrier technique including the use of a cap, mask, sterile

gown, sterile gloves, large sterile sheet, appropriate hand hygiene, and 2%

chlorhexidine for cutaneous antisepsis (or acceptable alternative antiseptic

per current guidelines) were followed for this procedure..



Real-time ultrasound demonstrated a patent right basilic vein which was 

prepped and draped in usual sterile fashion. 1% lidocaine used for local

anesthesia. Using real-time ultrasound guidance the access needle

percutaneously punctured the selected right basilic vein. Reference ultrasound

images were saved to the medical record.



A guidewire was advanced through the needle to the cavoatrial junction, and a

peel-away sheath placed.   The catheter was cut to length and inserted through

the peel-away sheath such that its tip is at the cavoatrial junction. The wire

and sheath were removed, and the catheter secured in place, and a sterile

dressing was applied. Catheter was found to flush and aspirate normally. No

immediate complications are identified.



FLUORO TIME:  0.3

DOSE AREA PRODUCT:  1Gycm2



 Impression: Ultrasound and fluoroscopically guided placement of a right upper

extremity PICC line.

## 2020-03-17 NOTE — PDOC
SAURAV NASH APRN 3/17/20 1155:


SURGICAL PROGRESS NOTE


Subjective


pain across abdomen


noted no UOP


Vital Signs





Vital Signs








  Date Time  Temp Pulse Resp B/P (MAP) Pulse Ox O2 Delivery O2 Flow Rate FiO2


 


3/17/20 10:09  135  122/71    


 


3/17/20 07:00 98.0  20  92 Room Air  





 98.0       








I&O











Intake and Output 


 


 3/17/20





 07:00


 


Intake Total 0 ml


 


Balance 0 ml


 


 


 


Intake Oral 0 ml


 


# Voids 6








General:  Cooperative, Other (acutely ill appearing )


Abdomen:  Soft, Other (TTP across upper abdomen )


Labs





Laboratory Tests








Test


 3/16/20


03:40 3/16/20


05:31 3/16/20


12:42 3/16/20


17:45


 


White Blood Count


 17.7 x10^3/uL


(4.0-11.0) 


 


 





 


Red Blood Count


 4.55 x10^6/uL


(3.50-5.40) 


 


 





 


Hemoglobin


 15.2 g/dL


(12.0-15.5) 


 


 





 


Hematocrit


 44.3 %


(36.0-47.0) 


 


 





 


Mean Corpuscular Volume 98 fL ()    


 


Mean Corpuscular Hemoglobin 33 pg (25-35)    


 


Mean Corpuscular Hemoglobin


Concent 34 g/dL


(31-37) 


 


 





 


Red Cell Distribution Width


 12.9 %


(11.5-14.5) 


 


 





 


Platelet Count


 294 x10^3/uL


(140-400) 


 


 





 


Neutrophils (%) (Auto) 89 % (31-73)    


 


Lymphocytes (%) (Auto) 7 % (24-48)    


 


Monocytes (%) (Auto) 4 % (0-9)    


 


Eosinophils (%) (Auto) 0 % (0-3)    


 


Basophils (%) (Auto) 0 % (0-3)    


 


Neutrophils # (Auto)


 15.7 x10^3/uL


(1.8-7.7) 


 


 





 


Lymphocytes # (Auto)


 1.3 x10^3/uL


(1.0-4.8) 


 


 





 


Monocytes # (Auto)


 0.7 x10^3/uL


(0.0-1.1) 


 


 





 


Eosinophils # (Auto)


 0.0 x10^3/uL


(0.0-0.7) 


 


 





 


Basophils # (Auto)


 0.0 x10^3/uL


(0.0-0.2) 


 


 





 


Segmented Neutrophils % 86 % (35-66)    


 


Band Neutrophils % 3 % (0-9)    


 


Lymphocytes % 6 % (24-48)    


 


Monocytes % 5 % (0-10)    


 


Platelet Estimate


 Adequate


(ADEQUATE) 


 


 





 


Sodium Level


 138 mmol/L


(136-145) 


 


 





 


Potassium Level


 4.3 mmol/L


(3.5-5.1) 


 


 





 


Chloride Level


 105 mmol/L


() 


 


 





 


Carbon Dioxide Level


 27 mmol/L


(21-32) 


 


 





 


Anion Gap 6 (6-14)    


 


Blood Urea Nitrogen


 16 mg/dL


(7-20) 


 


 





 


Creatinine


 1.2 mg/dL


(0.6-1.0) 


 


 





 


Estimated GFR


(Cockcroft-Gault) 47.7 


 


 


 





 


BUN/Creatinine Ratio 13 (6-20)    


 


Glucose Level


 196 mg/dL


(70-99) 


 


 





 


Calcium Level


 8.9 mg/dL


(8.5-10.1) 


 


 





 


Total Bilirubin


 1.4 mg/dL


(0.2-1.0) 


 


 





 


Aspartate Amino Transf


(AST/SGOT) 401 U/L


(15-37) 


 


 





 


Alanine Aminotransferase


(ALT/SGPT) 249 U/L


(14-59) 


 


 





 


Alkaline Phosphatase


 87 U/L


() 


 


 





 


Total Protein


 6.0 g/dL


(6.4-8.2) 


 


 





 


Albumin


 3.4 g/dL


(3.4-5.0) 


 


 





 


Albumin/Globulin Ratio 1.3 (1.0-1.7)    


 


Lipase


 38943 U/L


() 


 


 





 


Urine Collection Type  Unknown   


 


Urine Color  Yellow   


 


Urine Clarity  Clear   


 


Urine pH  5.5 (<5.0-8.0)   


 


Urine Specific Gravity


 


 >=1.030


(1.000-1.030) 


 





 


Urine Protein


 


 Negative mg/dL


(NEG-TRACE) 


 





 


Urine Glucose (UA)


 


 Negative mg/dL


(NEG) 


 





 


Urine Ketones (Stick)


 


 Negative mg/dL


(NEG) 


 





 


Urine Blood  Negative (NEG)   


 


Urine Nitrite  Negative (NEG)   


 


Urine Bilirubin  Negative (NEG)   


 


Urine Urobilinogen Dipstick


 


 1.0 mg/dL (0.2


mg/dL) 


 





 


Urine Leukocyte Esterase  Negative (NEG)   


 


Urine RBC  0 /HPF (0-2)   


 


Urine WBC  0 /HPF (0-4)   


 


Urine Squamous Epithelial


Cells 


 Few /LPF 


 


 





 


Urine Bacteria  0 /HPF (0-FEW)   


 


Glucose (Fingerstick)


 


 


 224 mg/dL


(70-99) 209 mg/dL


(70-99)


 


Test


 3/17/20


00:40 3/17/20


03:27 3/17/20


05:44 3/17/20


11:48


 


Glucose (Fingerstick)


 212 mg/dL


(70-99) 196 mg/dL


(70-99) 186 mg/dL


(70-99) 231 mg/dL


(70-99)








Laboratory Tests








Test


 3/16/20


12:42 3/16/20


17:45 3/17/20


00:40 3/17/20


03:27


 


Glucose (Fingerstick)


 224 mg/dL


(70-99) 209 mg/dL


(70-99) 212 mg/dL


(70-99) 196 mg/dL


(70-99)


 


Test


 3/17/20


05:44 3/17/20


11:48 


 





 


Glucose (Fingerstick)


 186 mg/dL


(70-99) 231 mg/dL


(70-99) 


 











Problem List


Problems


Medical Problems:


(1) Acute pancreatitis


Status: Acute  





(2) Cholelithiasis


Status: Acute  








Assessment/Plan


pancreatitis


labs are pending


supportive care





KIEL BAL MD 3/17/20 1305:


SURGICAL PROGRESS NOTE


Assessment/Plan


pt seen


thirsty


still has pain


labs PND











NICKEL,SAURAV L APRN            Mar 17, 2020 11:55


KIEL BAL MD                Mar 17, 2020 13:05

## 2020-03-17 NOTE — PDOC
PROGRESS NOTES


Chief Complaint


Chief Complaint


A/P:


Acute pancreatitis - without necrosis or infection, likely gallstone 

pancreatitis. GI and general surgery consulted. Will keep NPO, pain control. 

Will repeat 


Calculus of gallbladder with acute cholecystitis without obstruction - with 

secondary pancreatitis. Lap naif is indicated


Prediabetes - will keep on sliding scale


HTN - cont prn hydralazine


Leukocytosis - she meets SIRS criteria with acute pancreatitis as end organ 

dysfunction, no sign of infection, will repeat CT





FEN - NPO


PPX - lovenox


FULL CODE


Dispo - inpatient for 2 midnights





History of Present Illness


History of Present Illness


Ms Tadeo is a 48yo F w/ PMHx HTN, prediabetes who presents the emergency room

complaints of abdominal pain. Patient described off and on 3 days. She states is

constant, described as a squeezing sensation in a band-like distribution. + 

nausea, vomiting.  She denies any fever or diarrhea.  Patient denies any 

abdominal surgical procedures.  She states is worse with movements, car ride.  

Pain initially was upper abdomen however now pretty much generalized.  Last 

bowel movement was 3/15/2020. Nothing makes her pain better.  Patient denies any

shortness of breath.  She does state the pain moves into her chest.  Denies any 

headache or visual changes.


Lipase 24759, , , Bilirubin 1.4.


CT abdomen confirms pancreatic inflammation, peripancreatic fluid and 

inflammatory changes around the pancreas consistent with pancreatitis. 

Cholelithiasis and 1.4cm uterine fibroid as well as possible left salpingitis. 

Admitted for further care





Overnight per report no urine output. Still with severe pain, no urine on 

bladder scan. Afebrile overnight, but very tachycardic. No CP or SOB. She is 

asking for something to drink. Unable to get labs this morning.





Plan:


Add dilaudid. 


PICC for poor access, need for frequent blood draws


Renal US, concern for decreased UOP


Low threshold for ICU transfer if she continues to decompensate





Vitals


Vitals





Vital Signs








  Date Time  Temp Pulse Resp B/P (MAP) Pulse Ox O2 Delivery O2 Flow Rate FiO2


 


3/17/20 07:00 98.0 108 20 107/57 (74) 92 Room Air  





 98.0       











Physical Exam


General:  Alert, Oriented X3, Cooperative, moderate distress


Heart:  Regular rate, Normal S1, Normal S2, No murmurs


Abdomen:  Normal bowel sounds, Soft, No hepatosplenomegaly, No masses, Other 

(Diffuse tenderness)


Extremities:  No clubbing, No cyanosis, No edema, Normal pulses, No 

tenderness/swelling


Skin:  No rashes, No breakdown, No significant lesion





Labs


LABS





Laboratory Tests








Test


 3/16/20


12:42 3/16/20


17:45 3/17/20


00:40 3/17/20


03:27


 


Glucose (Fingerstick)


 224 mg/dL


(70-99) 209 mg/dL


(70-99) 212 mg/dL


(70-99) 196 mg/dL


(70-99)


 


Test


 3/17/20


05:44 


 


 





 


Glucose (Fingerstick)


 186 mg/dL


(70-99) 


 


 














Assessment and Plan


Assessmemt and Plan


Problems


Medical Problems:


(1) Acute pancreatitis


Status: Acute  





(2) Cholelithiasis


Status: Acute  











Comment


Review of Relevant


I have reviewed the following items josy (where applicable) has been applied.


Labs





Laboratory Tests








Test


 3/16/20


03:40 3/16/20


05:31 3/16/20


12:42 3/16/20


17:45


 


White Blood Count


 17.7 x10^3/uL


(4.0-11.0) 


 


 





 


Red Blood Count


 4.55 x10^6/uL


(3.50-5.40) 


 


 





 


Hemoglobin


 15.2 g/dL


(12.0-15.5) 


 


 





 


Hematocrit


 44.3 %


(36.0-47.0) 


 


 





 


Mean Corpuscular Volume 98 fL ()    


 


Mean Corpuscular Hemoglobin 33 pg (25-35)    


 


Mean Corpuscular Hemoglobin


Concent 34 g/dL


(31-37) 


 


 





 


Red Cell Distribution Width


 12.9 %


(11.5-14.5) 


 


 





 


Platelet Count


 294 x10^3/uL


(140-400) 


 


 





 


Neutrophils (%) (Auto) 89 % (31-73)    


 


Lymphocytes (%) (Auto) 7 % (24-48)    


 


Monocytes (%) (Auto) 4 % (0-9)    


 


Eosinophils (%) (Auto) 0 % (0-3)    


 


Basophils (%) (Auto) 0 % (0-3)    


 


Neutrophils # (Auto)


 15.7 x10^3/uL


(1.8-7.7) 


 


 





 


Lymphocytes # (Auto)


 1.3 x10^3/uL


(1.0-4.8) 


 


 





 


Monocytes # (Auto)


 0.7 x10^3/uL


(0.0-1.1) 


 


 





 


Eosinophils # (Auto)


 0.0 x10^3/uL


(0.0-0.7) 


 


 





 


Basophils # (Auto)


 0.0 x10^3/uL


(0.0-0.2) 


 


 





 


Segmented Neutrophils % 86 % (35-66)    


 


Band Neutrophils % 3 % (0-9)    


 


Lymphocytes % 6 % (24-48)    


 


Monocytes % 5 % (0-10)    


 


Platelet Estimate


 Adequate


(ADEQUATE) 


 


 





 


Sodium Level


 138 mmol/L


(136-145) 


 


 





 


Potassium Level


 4.3 mmol/L


(3.5-5.1) 


 


 





 


Chloride Level


 105 mmol/L


() 


 


 





 


Carbon Dioxide Level


 27 mmol/L


(21-32) 


 


 





 


Anion Gap 6 (6-14)    


 


Blood Urea Nitrogen


 16 mg/dL


(7-20) 


 


 





 


Creatinine


 1.2 mg/dL


(0.6-1.0) 


 


 





 


Estimated GFR


(Cockcroft-Gault) 47.7 


 


 


 





 


BUN/Creatinine Ratio 13 (6-20)    


 


Glucose Level


 196 mg/dL


(70-99) 


 


 





 


Calcium Level


 8.9 mg/dL


(8.5-10.1) 


 


 





 


Total Bilirubin


 1.4 mg/dL


(0.2-1.0) 


 


 





 


Aspartate Amino Transf


(AST/SGOT) 401 U/L


(15-37) 


 


 





 


Alanine Aminotransferase


(ALT/SGPT) 249 U/L


(14-59) 


 


 





 


Alkaline Phosphatase


 87 U/L


() 


 


 





 


Total Protein


 6.0 g/dL


(6.4-8.2) 


 


 





 


Albumin


 3.4 g/dL


(3.4-5.0) 


 


 





 


Albumin/Globulin Ratio 1.3 (1.0-1.7)    


 


Lipase


 74317 U/L


() 


 


 





 


Urine Collection Type  Unknown   


 


Urine Color  Yellow   


 


Urine Clarity  Clear   


 


Urine pH  5.5 (<5.0-8.0)   


 


Urine Specific Gravity


 


 >=1.030


(1.000-1.030) 


 





 


Urine Protein


 


 Negative mg/dL


(NEG-TRACE) 


 





 


Urine Glucose (UA)


 


 Negative mg/dL


(NEG) 


 





 


Urine Ketones (Stick)


 


 Negative mg/dL


(NEG) 


 





 


Urine Blood  Negative (NEG)   


 


Urine Nitrite  Negative (NEG)   


 


Urine Bilirubin  Negative (NEG)   


 


Urine Urobilinogen Dipstick


 


 1.0 mg/dL (0.2


mg/dL) 


 





 


Urine Leukocyte Esterase  Negative (NEG)   


 


Urine RBC  0 /HPF (0-2)   


 


Urine WBC  0 /HPF (0-4)   


 


Urine Squamous Epithelial


Cells 


 Few /LPF 


 


 





 


Urine Bacteria  0 /HPF (0-FEW)   


 


Glucose (Fingerstick)


 


 


 224 mg/dL


(70-99) 209 mg/dL


(70-99)


 


Test


 3/17/20


00:40 3/17/20


03:27 3/17/20


05:44 





 


Glucose (Fingerstick)


 212 mg/dL


(70-99) 196 mg/dL


(70-99) 186 mg/dL


(70-99) 











Laboratory Tests








Test


 3/16/20


12:42 3/16/20


17:45 3/17/20


00:40 3/17/20


03:27


 


Glucose (Fingerstick)


 224 mg/dL


(70-99) 209 mg/dL


(70-99) 212 mg/dL


(70-99) 196 mg/dL


(70-99)


 


Test


 3/17/20


05:44 


 


 





 


Glucose (Fingerstick)


 186 mg/dL


(70-99) 


 


 











Medications





Current Medications


Sodium Chloride 1,000 ml @  1,000 mls/hr Q1H IV  Last administered on 3/16/20at 

03:00;  Start 3/16/20 at 03:00;  Stop 3/16/20 at 03:59;  Status DC


Ondansetron HCl (Zofran) 4 mg 1X  ONCE IVP  Last administered on 3/16/20at 

03:27;  Start 3/16/20 at 03:00;  Stop 3/16/20 at 03:01;  Status DC


Morphine Sulfate (Morphine Sulfate) 4 mg 1X  ONCE IV ;  Start 3/16/20 at 03:00; 

Stop 3/16/20 at 03:01;  Status Cancel


Ketorolac Tromethamine (Toradol 30mg Vial) 30 mg 1X  ONCE IV  Last administered 

on 3/16/20at 02:54;  Start 3/16/20 at 03:00;  Stop 3/16/20 at 03:01;  Status DC


Fentanyl Citrate (Fentanyl 2ml Vial) 25 mcg 1X  ONCE IVP  Last administered on 

3/16/20at 03:23;  Start 3/16/20 at 03:30;  Stop 3/16/20 at 03:31;  Status DC


Fentanyl Citrate (Fentanyl 2ml Vial) 100 mcg STK-MED ONCE .ROUTE ;  Start 

3/16/20 at 03:18;  Stop 3/16/20 at 03:18;  Status DC


Iohexol (Omnipaque 350 Mg/ml) 90 ml 1X  ONCE IV  Last administered on 3/16/20at 

03:25;  Start 3/16/20 at 03:30;  Stop 3/16/20 at 03:31;  Status DC


Info (CONTRAST GIVEN -- Rx MONITORING) 1 each PRN DAILY  PRN MC SEE COMMENTS;  

Start 3/16/20 at 03:30;  Stop 3/18/20 at 03:29


Hydromorphone HCl (Dilaudid) 0.5 mg 1X  ONCE IV  Last administered on 3/16/20at 

03:55;  Start 3/16/20 at 04:30;  Stop 3/16/20 at 04:32;  Status DC


Ondansetron HCl (Zofran) 4 mg PRN Q8HRS  PRN IV NAUSEA/VOMITING 1ST CHOICE;  

Start 3/16/20 at 05:00;  Stop 3/16/20 at 09:27;  Status DC


Morphine Sulfate (Morphine Sulfate) 2 mg PRN Q2HR  PRN IV SEVERE PAIN 7-10 Last 

administered on 3/17/20at 08:55;  Start 3/16/20 at 05:00


Sodium Chloride 1,000 ml @  125 mls/hr Q8H IV  Last administered on 3/16/20at 

20:56;  Start 3/16/20 at 05:00;  Stop 3/17/20 at 04:59;  Status DC


Hydromorphone HCl (Dilaudid) 0.5 mg PRN Q3HRS  PRN IV SEVERE PAIN 7-10 Last 

administered on 3/16/20at 23:02;  Start 3/16/20 at 05:00


Piperacillin Sod/ Tazobactam Sod 4.5 gm/Sodium Chloride 100 ml @  200 mls/hr 1X 

ONCE IV  Last administered on 3/16/20at 05:44;  Start 3/16/20 at 06:00;  Stop 

3/16/20 at 06:29;  Status DC


Ondansetron HCl (Zofran) 4 mg PRN Q4HRS  PRN IV NAUSEA/VOMITING 1ST CHOICE Last 

administered on 3/17/20at 03:29;  Start 3/16/20 at 09:30


Insulin Human Lispro (HumaLOG) 0-9 UNITS Q6HRS SQ  Last administered on 

3/17/20at 00:52;  Start 3/16/20 at 09:30


Dextrose (Dextrose 50%-Water Syringe) 12.5 gm PRN Q15MIN  PRN IV SEE COMMENTS;  

Start 3/16/20 at 09:30


Pantoprazole Sodium (PROTONIX VIAL for IV PUSH) 40 mg DAILYAC IVP  Last 

administered on 3/17/20at 08:54;  Start 3/16/20 at 11:30


Prochlorperazine Edisylate (Compazine) 10 mg PRN Q6HRS  PRN IV NAUSEA/VOMITING 

Last administered on 3/17/20at 00:42;  Start 3/16/20 at 17:45


Atenolol (Tenormin) 100 mg DAILY PO ;  Start 3/17/20 at 09:00;  Stop 3/16/20 at 

20:08;  Status DC


Metoprolol Tartrate (Lopressor Vial) 2.5 mg Q6HRS IVP  Last administered on 

3/17/20at 05:51;  Start 3/16/20 at 20:15





Active Scripts


Active


Reported


Bisoprolol Fumarate 5 Mg Tablet 10 Mg PO DAILY


Vitals/I & O





Vital Sign - Last 24 Hours








 3/16/20 3/16/20 3/16/20 3/16/20





 09:11 10:10 10:40 11:00


 


Temp    97.8





    97.8


 


Pulse    77


 


Resp    16


 


B/P (MAP)    160/68 (98)


 


Pulse Ox 99 99 99 96


 


O2 Delivery    Nasal Cannula


 


    





    





 3/16/20 3/16/20 3/16/20 3/16/20





 12:02 12:31 12:32 13:01


 


Pulse Ox 96 96 96 96





 3/16/20 3/16/20 3/16/20 3/16/20





 14:11 14:41 15:00 16:07


 


Temp   97.4 





   97.4 


 


Pulse   97 


 


Resp   16 


 


B/P (MAP)   116/76 (89) 


 


Pulse Ox 96 96 94 96


 


O2 Delivery   Room Air 


 


    





    





 3/16/20 3/16/20 3/16/20 3/16/20





 16:37 17:02 17:32 19:15


 


Temp    98.5





    98.5


 


Pulse    124


 


Resp    14


 


B/P (MAP)    122/81 (95)


 


Pulse Ox 96 96 96 94


 


O2 Delivery    Room Air


 


    





    





 3/16/20 3/16/20 3/16/20 3/16/20





 20:20 20:58 20:58 21:35


 


Pulse   124 


 


Resp  18  16


 


B/P (MAP)   122/87 


 


Pulse Ox  96  96


 


O2 Delivery Room Air Room Air  Room Air





 3/16/20 3/16/20 3/16/20 3/17/20





 23:02 23:47 23:51 00:42


 


Temp   98.0 





   98.0 


 


Pulse   109 109


 


Resp 18 17 16 


 


B/P (MAP)   109/73 (85) 109/73


 


Pulse Ox 96 96 96 


 


O2 Delivery Room Air Room Air Room Air 


 


    





    





 3/17/20 3/17/20 3/17/20 3/17/20





 00:43 01:13 03:29 03:54


 


Temp    97.9





    97.9


 


Pulse    123


 


Resp 18 16 16 20


 


B/P (MAP)    110/65 (80)


 


Pulse Ox 96 96 96 94


 


O2 Delivery Room Air Room Air Room Air Room Air


 


    





    





 3/17/20 3/17/20 3/17/20 3/17/20





 03:59 05:51 05:55 06:25


 


Pulse  123  


 


Resp 16  18 14


 


B/P (MAP)  110/65  


 


Pulse Ox 94  94 


 


O2 Delivery Room Air  Room Air Room Air





 3/17/20   





 07:00   


 


Temp 98.0   





 98.0   


 


Pulse 108   


 


Resp 20   


 


B/P (MAP) 107/57 (74)   


 


Pulse Ox 92   


 


O2 Delivery Room Air   














Intake and Output   


 


 3/16/20 3/16/20 3/17/20





 14:59 22:59 06:59


 


Intake Total  0 ml 0 ml


 


Balance  0 ml 0 ml

















GAETANO GONCALVES MD        Mar 17, 2020 09:00

## 2020-03-17 NOTE — RAD
Exam: CT of abdomen without and with contrast

 

INDICATION: Pancreatitis

 

TECHNIQUE: Sequential axial images through the abdomen obtained before and

after the administration of 60 mL of Omni 300 IV contrast. Sagittal and 

coronal reformatted images were reconstructed from the axial data and 

reviewed.

 

Comparisons: None

 

FINDINGS:

Heart size is normal. No pericardial effusion. Small bilateral pleural 

effusions greater on the right with adjacent atelectasis.

 

Liver, spleen, and adrenals are unremarkable. Gallstones are noted within 

the gallbladder.

 

Diffuse inflammatory changes seen noted surrounding the pancreas. There is

generalized hypoenhancement of the pancreatic parenchyma. Extensive 

inflammatory changes seen surrounding the pancreas which have increased 

when compared to the prior study. Additionally, there has been development

of a small amount of intra-abdominal ascites noted throughout the abdomen.

 

Kidneys demonstrate symmetric enhancement. No perinephric inflammation or 

hydronephrosis. No renal or ureteral calculi.

 

Visualized portions of the large and small bowel are unremarkable.

 

Abdominal aorta has a normal course and caliber. No enlarged 

intra-abdominal lymph nodes.

 

No suspicious osseous lesions or acute fractures.

 

IMPRESSION:

Progressive inflammatory changes surrounding the pancreas consistent with 

acute pancreatitis. There is now heterogenous enhancement of the pancreas 

particularly at the pancreatic neck and tail. This may represent 

developing necrosis. Repeat follow-up imaging is recommended in 4-6 days 

to reassess.

 

 

Exposure: One or more of the following in the visualized dose reduction 

techniques were utilized for this examination:

1.  Automated exposure control

2.  Adjustment of the MA and/or KV according to patient size

3.  Use of iterative of reconstructive technique

 

Electronically signed by: Dayana Pena MD (3/17/2020 5:40 PM) [id number]

## 2020-03-17 NOTE — RAD
EXAM: Renal sonogram.

 

HISTORY: No urine output.

 

TECHNIQUE: Sonographic imaging of the kidneys and bladder was performed.

 

COMPARISON: None.

 

FINDINGS: The kidneys are normal in size. No solid or cystic renal lesion 

is seen. There is no hydronephrosis. There is a [place] catheter within a 

nearly empty bladder.

 

IMPRESSION:

1. Sonographically unremarkable kidneys.

2. [place] catheter within a nearly empty bladder.

 

Electronically signed by: Irish Rios MD (3/17/2020 11:56 AM) [id number]

## 2020-03-17 NOTE — NUR
This RN was called to Vascular lab to assist with patient who was in respiratory distress, 
diaphoretic,and tachycardic. Patient had right upper extremity PICC line placed by Dr. Stratton. Patient's nurse on [address] had given patient IV morphine prior to patient going to 
Vascular lab.  Patient placed on 2L of oxygen per nasal cannula and oxygen saturation was 
98%. Patient remained diaphoretic and blood pressure was 88/55.  Patient complained of 
nausea and abdominal pain. 150cc fluid bolus of normal saline administered and patient's 
blood pressure came up to 111/58. Patient's heart rate remained elevated at 140.  Dr. Stephens 
notified of patient's condition and he requested that patient be transferred to ICU.  House 
Supervisor (Britany) notified of need for ICU bed. Report given to Lb CALL in ICU and this RN 
and Troy from vascular lab transferred patient by bed to ICU room 116.

## 2020-03-17 NOTE — PDOC
PROGRESS NOTES


Chief Complaint


Chief Complaint


A/P:


Acute pancreatitis - without necrosis or infection, likely gallstone 

pancreatitis. GI and general surgery consulted. Will keep NPO, pain control. 

Will repeat CT now


Calculus of gallbladder with acute cholecystitis without obstruction - with 

secondary pancreatitis. Lap naif is indicated, will need to await pancreatitis 

resolution


Prediabetes - will keep on sliding scale


HTN - cont prn hydralazine


Leukocytosis - she meets SIRS criteria with acute pancreatitis as end organ 

dysfunction, no sign of infection, will repeat CT abdomen now


Hypoxia - possibly multifactorial with her pain medication dosing. Will obtain 

CXR





FEN - NPO


PPX - lovenox


FULL CODE


Dispo - inpatient for 2 midnights. Transferred to ICU for worsening pancreatitis

symptoms


CC time 45 minutes





History of Present Illness


History of Present Illness


Ms Tadeo is a 50yo F w/ PMHx HTN, prediabetes who presents the emergency room

complaints of abdominal pain. Patient described off and on 3 days. She states is

constant, described as a squeezing sensation in a band-like distribution. + 

nausea, vomiting.  She denies any fever or diarrhea.  Patient denies any 

abdominal surgical procedures.  She states is worse with movements, car ride.  

Pain initially was upper abdomen however now pretty much generalized.  Last 

bowel movement was 3/15/2020. Nothing makes her pain better.  Patient denies any

shortness of breath.  She does state the pain moves into her chest.  Denies any 

headache or visual changes.


Lipase 16396, , , Bilirubin 1.4.


CT abdomen confirms pancreatic inflammation, peripancreatic fluid and 

inflammatory changes around the pancreas consistent with pancreatitis. C

holelithiasis and 1.4cm uterine fibroid as well as possible left salpingitis. 

Admitted for further care





3/17 am: Overnight per report no urine output. Still with severe pain, no urine 

on bladder scan. Afebrile overnight, but very tachycardic. No CP or SOB. She is 

asking for something to drink. Unable to get labs this morning. Added dilaudid 

for pain, PICC placed per IR. Renal US negative.





Seen bedside in ICU, given 2L additional NSS and albumin infusion. Still 

hypotensive, started on levophed. Repeat CT abdomen pending. SOB improved after 

IVF. Pain controlled on dilaudid. Updated her fiancee





Plan:


ICU overnight


Repeat CT abdomen, if pancreatic necrosis noted or she becomes febrile, will 

order antibiotics.


Albumin bolus BID prn in addition to NSS


Repeat labs now.





Vitals


Vitals





Vital Signs








  Date Time  Temp Pulse Resp B/P (MAP) Pulse Ox O2 Delivery O2 Flow Rate FiO2


 


3/17/20 15:40      Nasal Cannula 4.0 


 


3/17/20 15:33   25  100   


 


3/17/20 15:00 97.3 140      





 97.3       











Physical Exam


General:  Cooperative, Other (acutely ill appearing )


Heart:  Regular rate, Normal S1, Normal S2, No murmurs


Abdomen:  Soft, Other (TTP across upper abdomen )


Extremities:  No clubbing, No cyanosis, No edema, Normal pulses, No 

tenderness/swelling


Skin:  No rashes, No breakdown, No significant lesion





Labs


LABS





Laboratory Tests








Test


 3/16/20


17:45 3/17/20


00:40 3/17/20


03:27 3/17/20


05:44


 


Glucose (Fingerstick)


 209 mg/dL


(70-99) 212 mg/dL


(70-99) 196 mg/dL


(70-99) 186 mg/dL


(70-99)


 


Test


 3/17/20


11:48 


 


 





 


Glucose (Fingerstick)


 231 mg/dL


(70-99) 


 


 














Assessment and Plan


Assessmemt and Plan


Problems


Medical Problems:


(1) Acute pancreatitis


Status: Acute  





(2) Cholelithiasis


Status: Acute  











Comment


Review of Relevant


I have reviewed the following items josy (where applicable) has been applied.


Labs





Laboratory Tests








Test


 3/16/20


03:40 3/16/20


05:31 3/16/20


12:42 3/16/20


17:45


 


White Blood Count


 17.7 x10^3/uL


(4.0-11.0) 


 


 





 


Red Blood Count


 4.55 x10^6/uL


(3.50-5.40) 


 


 





 


Hemoglobin


 15.2 g/dL


(12.0-15.5) 


 


 





 


Hematocrit


 44.3 %


(36.0-47.0) 


 


 





 


Mean Corpuscular Volume 98 fL ()    


 


Mean Corpuscular Hemoglobin 33 pg (25-35)    


 


Mean Corpuscular Hemoglobin


Concent 34 g/dL


(31-37) 


 


 





 


Red Cell Distribution Width


 12.9 %


(11.5-14.5) 


 


 





 


Platelet Count


 294 x10^3/uL


(140-400) 


 


 





 


Neutrophils (%) (Auto) 89 % (31-73)    


 


Lymphocytes (%) (Auto) 7 % (24-48)    


 


Monocytes (%) (Auto) 4 % (0-9)    


 


Eosinophils (%) (Auto) 0 % (0-3)    


 


Basophils (%) (Auto) 0 % (0-3)    


 


Neutrophils # (Auto)


 15.7 x10^3/uL


(1.8-7.7) 


 


 





 


Lymphocytes # (Auto)


 1.3 x10^3/uL


(1.0-4.8) 


 


 





 


Monocytes # (Auto)


 0.7 x10^3/uL


(0.0-1.1) 


 


 





 


Eosinophils # (Auto)


 0.0 x10^3/uL


(0.0-0.7) 


 


 





 


Basophils # (Auto)


 0.0 x10^3/uL


(0.0-0.2) 


 


 





 


Segmented Neutrophils % 86 % (35-66)    


 


Band Neutrophils % 3 % (0-9)    


 


Lymphocytes % 6 % (24-48)    


 


Monocytes % 5 % (0-10)    


 


Platelet Estimate


 Adequate


(ADEQUATE) 


 


 





 


Sodium Level


 138 mmol/L


(136-145) 


 


 





 


Potassium Level


 4.3 mmol/L


(3.5-5.1) 


 


 





 


Chloride Level


 105 mmol/L


() 


 


 





 


Carbon Dioxide Level


 27 mmol/L


(21-32) 


 


 





 


Anion Gap 6 (6-14)    


 


Blood Urea Nitrogen


 16 mg/dL


(7-20) 


 


 





 


Creatinine


 1.2 mg/dL


(0.6-1.0) 


 


 





 


Estimated GFR


(Cockcroft-Gault) 47.7 


 


 


 





 


BUN/Creatinine Ratio 13 (6-20)    


 


Glucose Level


 196 mg/dL


(70-99) 


 


 





 


Calcium Level


 8.9 mg/dL


(8.5-10.1) 


 


 





 


Total Bilirubin


 1.4 mg/dL


(0.2-1.0) 


 


 





 


Aspartate Amino Transf


(AST/SGOT) 401 U/L


(15-37) 


 


 





 


Alanine Aminotransferase


(ALT/SGPT) 249 U/L


(14-59) 


 


 





 


Alkaline Phosphatase


 87 U/L


() 


 


 





 


Total Protein


 6.0 g/dL


(6.4-8.2) 


 


 





 


Albumin


 3.4 g/dL


(3.4-5.0) 


 


 





 


Albumin/Globulin Ratio 1.3 (1.0-1.7)    


 


Lipase


 30545 U/L


() 


 


 





 


Urine Collection Type  Unknown   


 


Urine Color  Yellow   


 


Urine Clarity  Clear   


 


Urine pH  5.5 (<5.0-8.0)   


 


Urine Specific Gravity


 


 >=1.030


(1.000-1.030) 


 





 


Urine Protein


 


 Negative mg/dL


(NEG-TRACE) 


 





 


Urine Glucose (UA)


 


 Negative mg/dL


(NEG) 


 





 


Urine Ketones (Stick)


 


 Negative mg/dL


(NEG) 


 





 


Urine Blood  Negative (NEG)   


 


Urine Nitrite  Negative (NEG)   


 


Urine Bilirubin  Negative (NEG)   


 


Urine Urobilinogen Dipstick


 


 1.0 mg/dL (0.2


mg/dL) 


 





 


Urine Leukocyte Esterase  Negative (NEG)   


 


Urine RBC  0 /HPF (0-2)   


 


Urine WBC  0 /HPF (0-4)   


 


Urine Squamous Epithelial


Cells 


 Few /LPF 


 


 





 


Urine Bacteria  0 /HPF (0-FEW)   


 


Glucose (Fingerstick)


 


 


 224 mg/dL


(70-99) 209 mg/dL


(70-99)


 


Test


 3/17/20


00:40 3/17/20


03:27 3/17/20


05:44 3/17/20


11:48


 


Glucose (Fingerstick)


 212 mg/dL


(70-99) 196 mg/dL


(70-99) 186 mg/dL


(70-99) 231 mg/dL


(70-99)








Laboratory Tests








Test


 3/16/20


17:45 3/17/20


00:40 3/17/20


03:27 3/17/20


05:44


 


Glucose (Fingerstick)


 209 mg/dL


(70-99) 212 mg/dL


(70-99) 196 mg/dL


(70-99) 186 mg/dL


(70-99)


 


Test


 3/17/20


11:48 


 


 





 


Glucose (Fingerstick)


 231 mg/dL


(70-99) 


 


 











Medications





Current Medications


Sodium Chloride 1,000 ml @  1,000 mls/hr Q1H IV  Last administered on 3/16/20at 

03:00;  Start 3/16/20 at 03:00;  Stop 3/16/20 at 03:59;  Status DC


Ondansetron HCl (Zofran) 4 mg 1X  ONCE IVP  Last administered on 3/16/20at 

03:27;  Start 3/16/20 at 03:00;  Stop 3/16/20 at 03:01;  Status DC


Morphine Sulfate (Morphine Sulfate) 4 mg 1X  ONCE IV ;  Start 3/16/20 at 03:00; 

Stop 3/16/20 at 03:01;  Status Cancel


Ketorolac Tromethamine (Toradol 30mg Vial) 30 mg 1X  ONCE IV  Last administered 

on 3/16/20at 02:54;  Start 3/16/20 at 03:00;  Stop 3/16/20 at 03:01;  Status DC


Fentanyl Citrate (Fentanyl 2ml Vial) 25 mcg 1X  ONCE IVP  Last administered on 

3/16/20at 03:23;  Start 3/16/20 at 03:30;  Stop 3/16/20 at 03:31;  Status DC


Fentanyl Citrate (Fentanyl 2ml Vial) 100 mcg STK-MED ONCE .ROUTE ;  Start 

3/16/20 at 03:18;  Stop 3/16/20 at 03:18;  Status DC


Iohexol (Omnipaque 350 Mg/ml) 90 ml 1X  ONCE IV  Last administered on 3/16/20at 

03:25;  Start 3/16/20 at 03:30;  Stop 3/16/20 at 03:31;  Status DC


Info (CONTRAST GIVEN -- Rx MONITORING) 1 each PRN DAILY  PRN MC SEE COMMENTS;  

Start 3/16/20 at 03:30;  Stop 3/18/20 at 03:29


Hydromorphone HCl (Dilaudid) 0.5 mg 1X  ONCE IV  Last administered on 3/16/20at 

03:55;  Start 3/16/20 at 04:30;  Stop 3/16/20 at 04:32;  Status DC


Ondansetron HCl (Zofran) 4 mg PRN Q8HRS  PRN IV NAUSEA/VOMITING 1ST CHOICE;  

Start 3/16/20 at 05:00;  Stop 3/16/20 at 09:27;  Status DC


Morphine Sulfate (Morphine Sulfate) 2 mg PRN Q2HR  PRN IV SEVERE PAIN 7-10 Last 

administered on 3/17/20at 12:26;  Start 3/16/20 at 05:00;  Stop 3/17/20 at 

14:15;  Status DC


Sodium Chloride 1,000 ml @  125 mls/hr Q8H IV  Last administered on 3/16/20at 

20:56;  Start 3/16/20 at 05:00;  Stop 3/17/20 at 04:59;  Status DC


Hydromorphone HCl (Dilaudid) 0.5 mg PRN Q3HRS  PRN IV SEVERE PAIN 7-10 Last 

administered on 3/17/20at 10:06;  Start 3/16/20 at 05:00;  Stop 3/17/20 at 

12:01;  Status DC


Piperacillin Sod/ Tazobactam Sod 4.5 gm/Sodium Chloride 100 ml @  200 mls/hr 1X 

ONCE IV  Last administered on 3/16/20at 05:44;  Start 3/16/20 at 06:00;  Stop 

3/16/20 at 06:29;  Status DC


Ondansetron HCl (Zofran) 4 mg PRN Q4HRS  PRN IV NAUSEA/VOMITING 1ST CHOICE Last 

administered on 3/17/20at 15:04;  Start 3/16/20 at 09:30


Insulin Human Lispro (HumaLOG) 0-9 UNITS Q6HRS SQ  Last administered on 

3/17/20at 12:33;  Start 3/16/20 at 09:30


Dextrose (Dextrose 50%-Water Syringe) 12.5 gm PRN Q15MIN  PRN IV SEE COMMENTS;  

Start 3/16/20 at 09:30


Pantoprazole Sodium (PROTONIX VIAL for IV PUSH) 40 mg DAILYAC IVP  Last 

administered on 3/17/20at 08:54;  Start 3/16/20 at 11:30


Prochlorperazine Edisylate (Compazine) 10 mg PRN Q6HRS  PRN IV NAUSEA/VOMITING, 

2nd CHOICE Last administered on 3/17/20at 00:42;  Start 3/16/20 at 17:45


Atenolol (Tenormin) 100 mg DAILY PO ;  Start 3/17/20 at 09:00;  Stop 3/16/20 at 

20:08;  Status DC


Metoprolol Tartrate (Lopressor Vial) 2.5 mg Q6HRS IVP  Last administered on 

3/17/20at 05:51;  Start 3/16/20 at 20:15;  Stop 3/17/20 at 10:02;  Status DC


Metoprolol Tartrate (Lopressor Vial) 5 mg Q6HRS IVP  Last administered on 

3/17/20at 10:09;  Start 3/17/20 at 10:15


Hydromorphone HCl (Dilaudid) 1 mg PRN Q3HRS  PRN IV SEVERE PAIN 7-10 Last 

administered on 3/17/20at 15:33;  Start 3/17/20 at 12:00


Lidocaine HCl (Buffered Lidocaine 1%) 3 ml STK-MED ONCE .ROUTE ;  Start 3/17/20 

at 12:55;  Stop 3/17/20 at 12:56;  Status DC


Albumin Human 500 ml @  125 mls/hr 1X  ONCE IV  Last administered on 3/17/20at 

14:33;  Start 3/17/20 at 14:30;  Stop 3/17/20 at 18:29


Norepinephrine Bitartrate 8 mg/ Dextrose 258 ml @  17.299 mls/ hr CONT  PRN IV 

PER PROTOCOL Last administered on 3/17/20at 15:24;  Start 3/17/20 at 15:30


Sodium Chloride 1,000 ml @  125 mls/hr Q8H IV ;  Start 3/17/20 at 16:00


Albumin Human 500 ml @  125 mls/hr PRN BID  PRN IV After every 2L NSS & BP < 

90mm;  Start 3/17/20 at 16:00





Active Scripts


Active


Reported


Bisoprolol Fumarate 5 Mg Tablet 10 Mg PO DAILY


Vitals/I & O





Vital Sign - Last 24 Hours








 3/16/20 3/16/20 3/16/20 3/16/20





 16:37 17:02 17:32 19:15


 


Temp    98.5





    98.5


 


Pulse    124


 


Resp    14


 


B/P (MAP)    122/81 (95)


 


Pulse Ox 96 96 96 94


 


O2 Delivery    Room Air


 


    





    





 3/16/20 3/16/20 3/16/20 3/16/20





 20:20 20:58 20:58 21:35


 


Pulse   124 


 


Resp  18  16


 


B/P (MAP)   122/87 


 


Pulse Ox  96  96


 


O2 Delivery Room Air Room Air  Room Air





 3/16/20 3/16/20 3/16/20 3/17/20





 23:02 23:47 23:51 00:42


 


Temp   98.0 





   98.0 


 


Pulse   109 109


 


Resp 18 17 16 


 


B/P (MAP)   109/73 (85) 109/73


 


Pulse Ox 96 96 96 


 


O2 Delivery Room Air Room Air Room Air 


 


    





    





 3/17/20 3/17/20 3/17/20 3/17/20





 00:43 01:13 03:29 03:54


 


Temp    97.9





    97.9


 


Pulse    123


 


Resp 18 16 16 20


 


B/P (MAP)    110/65 (80)


 


Pulse Ox 96 96 96 94


 


O2 Delivery Room Air Room Air Room Air Room Air


 


    





    





 3/17/20 3/17/20 3/17/20 3/17/20





 03:59 05:51 05:55 06:25


 


Pulse  123  


 


Resp 16  18 14


 


B/P (MAP)  110/65  


 


Pulse Ox 94  94 


 


O2 Delivery Room Air  Room Air Room Air





 3/17/20 3/17/20 3/17/20 3/17/20





 07:00 08:00 10:09 11:00


 


Temp 98.0   97.7





 98.0   97.7


 


Pulse 108  135 119


 


Resp 20   22


 


B/P (MAP) 107/57 (74)  122/71 119/80 (93)


 


Pulse Ox 92   94


 


O2 Delivery Room Air Room Air  Room Air


 


    





    





 3/17/20 3/17/20 3/17/20 





 15:00 15:33 15:40 


 


Temp 97.3   





 97.3   


 


Pulse 140   


 


Resp 25 25  


 


B/P (MAP)    


 


Pulse Ox 99 100  


 


O2 Delivery Nasal Cannula  Nasal Cannula 


 


O2 Flow Rate 4.0  4.0 














Intake and Output   


 


 3/16/20 3/16/20 3/17/20





 15:00 23:00 07:00


 


Intake Total  0 ml 0 ml


 


Balance  0 ml 0 ml











Hemodynamically unstable?:  No


Is patient in severe pain?:  Yes


Is NPO status required?:  Yes











GAETANO GONCALVES MD        Mar 17, 2020 16:21

## 2020-03-18 VITALS — SYSTOLIC BLOOD PRESSURE: 102 MMHG | DIASTOLIC BLOOD PRESSURE: 62 MMHG

## 2020-03-18 VITALS — DIASTOLIC BLOOD PRESSURE: 66 MMHG | SYSTOLIC BLOOD PRESSURE: 116 MMHG

## 2020-03-18 VITALS — SYSTOLIC BLOOD PRESSURE: 117 MMHG | DIASTOLIC BLOOD PRESSURE: 72 MMHG

## 2020-03-18 VITALS — SYSTOLIC BLOOD PRESSURE: 120 MMHG | DIASTOLIC BLOOD PRESSURE: 64 MMHG

## 2020-03-18 VITALS — SYSTOLIC BLOOD PRESSURE: 122 MMHG | DIASTOLIC BLOOD PRESSURE: 68 MMHG

## 2020-03-18 VITALS — SYSTOLIC BLOOD PRESSURE: 123 MMHG | DIASTOLIC BLOOD PRESSURE: 69 MMHG

## 2020-03-18 VITALS — SYSTOLIC BLOOD PRESSURE: 112 MMHG | DIASTOLIC BLOOD PRESSURE: 58 MMHG

## 2020-03-18 VITALS — DIASTOLIC BLOOD PRESSURE: 79 MMHG | SYSTOLIC BLOOD PRESSURE: 109 MMHG

## 2020-03-18 VITALS — SYSTOLIC BLOOD PRESSURE: 85 MMHG | DIASTOLIC BLOOD PRESSURE: 59 MMHG

## 2020-03-18 VITALS — DIASTOLIC BLOOD PRESSURE: 57 MMHG | SYSTOLIC BLOOD PRESSURE: 118 MMHG

## 2020-03-18 VITALS — SYSTOLIC BLOOD PRESSURE: 114 MMHG | DIASTOLIC BLOOD PRESSURE: 62 MMHG

## 2020-03-18 VITALS — DIASTOLIC BLOOD PRESSURE: 63 MMHG | SYSTOLIC BLOOD PRESSURE: 119 MMHG

## 2020-03-18 VITALS — DIASTOLIC BLOOD PRESSURE: 58 MMHG | SYSTOLIC BLOOD PRESSURE: 95 MMHG

## 2020-03-18 VITALS — SYSTOLIC BLOOD PRESSURE: 110 MMHG | DIASTOLIC BLOOD PRESSURE: 65 MMHG

## 2020-03-18 VITALS — SYSTOLIC BLOOD PRESSURE: 122 MMHG | DIASTOLIC BLOOD PRESSURE: 61 MMHG

## 2020-03-18 VITALS — SYSTOLIC BLOOD PRESSURE: 116 MMHG | DIASTOLIC BLOOD PRESSURE: 62 MMHG

## 2020-03-18 VITALS — DIASTOLIC BLOOD PRESSURE: 65 MMHG | SYSTOLIC BLOOD PRESSURE: 114 MMHG

## 2020-03-18 VITALS — SYSTOLIC BLOOD PRESSURE: 96 MMHG | DIASTOLIC BLOOD PRESSURE: 63 MMHG

## 2020-03-18 VITALS — SYSTOLIC BLOOD PRESSURE: 107 MMHG | DIASTOLIC BLOOD PRESSURE: 63 MMHG

## 2020-03-18 VITALS — SYSTOLIC BLOOD PRESSURE: 111 MMHG | DIASTOLIC BLOOD PRESSURE: 64 MMHG

## 2020-03-18 VITALS — SYSTOLIC BLOOD PRESSURE: 128 MMHG | DIASTOLIC BLOOD PRESSURE: 65 MMHG

## 2020-03-18 VITALS — DIASTOLIC BLOOD PRESSURE: 63 MMHG | SYSTOLIC BLOOD PRESSURE: 96 MMHG

## 2020-03-18 VITALS — SYSTOLIC BLOOD PRESSURE: 90 MMHG | DIASTOLIC BLOOD PRESSURE: 59 MMHG

## 2020-03-18 VITALS — SYSTOLIC BLOOD PRESSURE: 91 MMHG | DIASTOLIC BLOOD PRESSURE: 64 MMHG

## 2020-03-18 VITALS — DIASTOLIC BLOOD PRESSURE: 64 MMHG | SYSTOLIC BLOOD PRESSURE: 122 MMHG

## 2020-03-18 VITALS — SYSTOLIC BLOOD PRESSURE: 135 MMHG | DIASTOLIC BLOOD PRESSURE: 68 MMHG

## 2020-03-18 VITALS — SYSTOLIC BLOOD PRESSURE: 139 MMHG | DIASTOLIC BLOOD PRESSURE: 72 MMHG

## 2020-03-18 VITALS — SYSTOLIC BLOOD PRESSURE: 131 MMHG | DIASTOLIC BLOOD PRESSURE: 70 MMHG

## 2020-03-18 LAB
ALBUMIN SERPL-MCNC: 2.1 G/DL (ref 3.4–5)
ALBUMIN/GLOB SERPL: 0.8 {RATIO} (ref 1–1.7)
ALP SERPL-CCNC: 60 U/L (ref 46–116)
ALT SERPL-CCNC: 169 U/L (ref 14–59)
ANION GAP SERPL CALC-SCNC: 12 MMOL/L (ref 6–14)
ANION GAP SERPL CALC-SCNC: 15 MMOL/L (ref 6–14)
AST SERPL-CCNC: 164 U/L (ref 15–37)
BASE EXCESS ABG: -15 MMOL/L (ref -3–3)
BASOPHILS # BLD AUTO: 0 X10^3/UL (ref 0–0.2)
BASOPHILS NFR BLD: 0 % (ref 0–3)
BILIRUB SERPL-MCNC: 1.3 MG/DL (ref 0.2–1)
BUN SERPL-MCNC: 63 MG/DL (ref 7–20)
BUN SERPL-MCNC: 64 MG/DL (ref 7–20)
BUN/CREAT SERPL: 11 (ref 6–20)
CALCIUM SERPL-MCNC: 5.3 MG/DL (ref 8.5–10.1)
CALCIUM SERPL-MCNC: < 5 MG/DL (ref 8.5–10.1)
CHLORIDE SERPL-SCNC: 112 MMOL/L (ref 98–107)
CHLORIDE SERPL-SCNC: 114 MMOL/L (ref 98–107)
CO2 SERPL-SCNC: 14 MMOL/L (ref 21–32)
CO2 SERPL-SCNC: 17 MMOL/L (ref 21–32)
CREAT SERPL-MCNC: 5.5 MG/DL (ref 0.6–1)
CREAT SERPL-MCNC: 5.9 MG/DL (ref 0.6–1)
EOSINOPHIL NFR BLD: 0 % (ref 0–3)
EOSINOPHIL NFR BLD: 0 X10^3/UL (ref 0–0.7)
ERYTHROCYTE [DISTWIDTH] IN BLOOD BY AUTOMATED COUNT: 14.3 % (ref 11.5–14.5)
GFR SERPLBLD BASED ON 1.73 SQ M-ARVRAT: 7.6 ML/MIN
GFR SERPLBLD BASED ON 1.73 SQ M-ARVRAT: 8.2 ML/MIN
GLOBULIN SER-MCNC: 2.7 G/DL (ref 2.2–3.8)
GLUCOSE SERPL-MCNC: 129 MG/DL (ref 70–99)
GLUCOSE SERPL-MCNC: 167 MG/DL (ref 70–99)
HBA1C MFR BLD: 5.6 % (ref 4.8–5.6)
HCO3 BLDA-SCNC: 11 MMOL/L (ref 21–28)
HCT VFR BLD CALC: 43 % (ref 36–47)
HCT VFR BLD CALC: 45.5 % (ref 36–47)
HGB BLD-MCNC: 14.2 G/DL (ref 12–15.5)
HGB BLD-MCNC: 15.1 G/DL (ref 12–15.5)
INSPIRATION SETTING TIME VENT: 35
LIPASE: 3832 U/L (ref 73–393)
LYMPHOCYTES # BLD: 1.3 X10^3/UL (ref 1–4.8)
LYMPHOCYTES NFR BLD AUTO: 6 % (ref 24–48)
MAGNESIUM SERPL-MCNC: 1.3 MG/DL (ref 1.8–2.4)
MAGNESIUM SERPL-MCNC: 2.1 MG/DL (ref 1.8–2.4)
MCH RBC QN AUTO: 34 PG (ref 25–35)
MCHC RBC AUTO-ENTMCNC: 33 G/DL (ref 31–37)
MCV RBC AUTO: 101 FL (ref 79–100)
MONO #: 1.4 X10^3/UL (ref 0–1.1)
MONOCYTES NFR BLD: 6 % (ref 0–9)
NEUT #: 19.8 X10^3/UL (ref 1.8–7.7)
NEUTROPHILS NFR BLD AUTO: 88 % (ref 31–73)
PCO2 BLDA: 26 MMHG (ref 35–46)
PHOSPHATE SERPL-MCNC: 1.7 MG/DL (ref 2.6–4.7)
PHOSPHATE SERPL-MCNC: 1.9 MG/DL (ref 2.6–4.7)
PLATELET # BLD AUTO: 207 X10^3/UL (ref 140–400)
PLATELET # BLD AUTO: 232 X10^3/UL (ref 140–400)
PO2 BLDA: 77 MMHG (ref 75–108)
POTASSIUM SERPL-SCNC: 5.7 MMOL/L (ref 3.5–5.1)
POTASSIUM SERPL-SCNC: 5.9 MMOL/L (ref 3.5–5.1)
PROT SERPL-MCNC: 4.8 G/DL (ref 6.4–8.2)
PROTHROMBIN TIME: 16.8 SEC (ref 11.7–14)
RBC # BLD AUTO: 4.24 X10^6/UL (ref 3.5–5.4)
SAO2 % BLDA: 95 % (ref 92–99)
SODIUM SERPL-SCNC: 141 MMOL/L (ref 136–145)
SODIUM SERPL-SCNC: 143 MMOL/L (ref 136–145)
WBC # BLD AUTO: 22.6 X10^3/UL (ref 4–11)
WBC # BLD AUTO: 22.9 X10^3/UL (ref 4–11)

## 2020-03-18 RX ADMIN — SODIUM BICARBONATE SCH MLS/HR: 84 INJECTION, SOLUTION INTRAVENOUS at 09:05

## 2020-03-18 RX ADMIN — METOPROLOL TARTRATE SCH MG: 5 INJECTION INTRAVENOUS at 00:46

## 2020-03-18 RX ADMIN — INSULIN LISPRO SCH UNITS: 100 INJECTION, SOLUTION INTRAVENOUS; SUBCUTANEOUS at 00:00

## 2020-03-18 RX ADMIN — PANTOPRAZOLE SODIUM SCH MG: 40 INJECTION, POWDER, FOR SOLUTION INTRAVENOUS at 09:05

## 2020-03-18 RX ADMIN — BACITRACIN SCH MLS/HR: 5000 INJECTION, POWDER, FOR SOLUTION INTRAMUSCULAR at 08:00

## 2020-03-18 RX ADMIN — METOPROLOL TARTRATE SCH MG: 5 INJECTION INTRAVENOUS at 05:46

## 2020-03-18 RX ADMIN — HYDROMORPHONE HYDROCHLORIDE PRN MG: 2 INJECTION INTRAMUSCULAR; INTRAVENOUS; SUBCUTANEOUS at 17:15

## 2020-03-18 RX ADMIN — Medication PRN EACH: at 12:43

## 2020-03-18 RX ADMIN — HYDROMORPHONE HYDROCHLORIDE PRN MG: 2 INJECTION INTRAMUSCULAR; INTRAVENOUS; SUBCUTANEOUS at 11:33

## 2020-03-18 RX ADMIN — HYDROMORPHONE HYDROCHLORIDE PRN MG: 2 INJECTION INTRAMUSCULAR; INTRAVENOUS; SUBCUTANEOUS at 23:56

## 2020-03-18 RX ADMIN — MEROPENEM SCH MLS/HR: 1 INJECTION, POWDER, FOR SOLUTION INTRAVENOUS at 05:45

## 2020-03-18 RX ADMIN — INSULIN LISPRO SCH UNITS: 100 INJECTION, SOLUTION INTRAVENOUS; SUBCUTANEOUS at 06:00

## 2020-03-18 RX ADMIN — BACITRACIN SCH MLS/HR: 5000 INJECTION, POWDER, FOR SOLUTION INTRAMUSCULAR at 02:46

## 2020-03-18 RX ADMIN — ONDANSETRON PRN MG: 2 INJECTION INTRAMUSCULAR; INTRAVENOUS at 23:56

## 2020-03-18 RX ADMIN — INSULIN LISPRO SCH UNITS: 100 INJECTION, SOLUTION INTRAVENOUS; SUBCUTANEOUS at 11:42

## 2020-03-18 RX ADMIN — SODIUM BICARBONATE SCH MLS/HR: 84 INJECTION, SOLUTION INTRAVENOUS at 14:06

## 2020-03-18 RX ADMIN — HYDROMORPHONE HYDROCHLORIDE PRN MG: 2 INJECTION INTRAMUSCULAR; INTRAVENOUS; SUBCUTANEOUS at 06:02

## 2020-03-18 RX ADMIN — SODIUM BICARBONATE SCH MLS/HR: 84 INJECTION, SOLUTION INTRAVENOUS at 19:52

## 2020-03-18 RX ADMIN — HYDROMORPHONE HYDROCHLORIDE PRN MG: 2 INJECTION INTRAMUSCULAR; INTRAVENOUS; SUBCUTANEOUS at 20:57

## 2020-03-18 RX ADMIN — AMIODARONE HYDROCHLORIDE PRN MLS/HR: 50 INJECTION, SOLUTION INTRAVENOUS at 11:34

## 2020-03-18 RX ADMIN — MEROPENEM SCH MLS/HR: 500 INJECTION, POWDER, FOR SOLUTION INTRAVENOUS at 17:16

## 2020-03-18 RX ADMIN — METOPROLOL TARTRATE SCH MG: 5 INJECTION INTRAVENOUS at 18:00

## 2020-03-18 RX ADMIN — METOPROLOL TARTRATE SCH MG: 5 INJECTION INTRAVENOUS at 12:00

## 2020-03-18 RX ADMIN — INSULIN LISPRO SCH UNITS: 100 INJECTION, SOLUTION INTRAVENOUS; SUBCUTANEOUS at 18:00

## 2020-03-18 NOTE — PDOC
Subjective:


Subjective:


Abdomen "a little tender," says breathing ok.





Objective:


Objective:


D/w nurse - nephrology to see.


Vital Signs:





                                   Vital Signs








  Date Time  Temp Pulse Resp B/P (MAP) Pulse Ox O2 Delivery O2 Flow Rate FiO2


 


3/18/20 09:10  132  122/61 (81)    


 


3/18/20 07:52 98.1  26  95 Room Air  





 98.1       


 


3/18/20 07:00       2.0 








Labs:





Laboratory Tests








Test


 3/17/20


11:48 3/17/20


16:50 3/17/20


18:02 3/17/20


20:30


 


Glucose (Fingerstick) 231 mg/dL   232 mg/dL  


 


White Blood Count  22.7 x10^3/uL   


 


Red Blood Count  4.12 x10^6/uL   


 


Hemoglobin  13.8 g/dL   


 


Hematocrit  42.0 %   


 


Mean Corpuscular Volume  102 fL   


 


Mean Corpuscular Hemoglobin  34 pg   


 


Mean Corpuscular Hemoglobin


Concent 


 33 g/dL 


 


 





 


Red Cell Distribution Width  13.8 %   


 


Platelet Count  212 x10^3/uL   


 


Sodium Level    139 mmol/L 


 


Potassium Level    6.8 mmol/L 


 


Chloride Level    109 mmol/L 


 


Carbon Dioxide Level    17 mmol/L 


 


Anion Gap    13 


 


Blood Urea Nitrogen    56 mg/dL 


 


Creatinine    5.0 mg/dL 


 


Estimated GFR


(Cockcroft-Gault) 


 


 


 9.2 





 


BUN/Creatinine Ratio    11 


 


Glucose Level    196 mg/dL 


 


Calcium Level    < 5.0 mg/dL 


 


Phosphorus Level    1.2 mg/dL 


 


Magnesium Level    1.2 mg/dL 


 


Total Bilirubin    1.9 mg/dL 


 


Aspartate Amino Transf


(AST/SGOT) 


 


 


 214 U/L 





 


Alanine Aminotransferase


(ALT/SGPT) 


 


 


 263 U/L 





 


Alkaline Phosphatase    75 U/L 


 


Total Protein    5.6 g/dL 


 


Albumin    2.8 g/dL 


 


Albumin/Globulin Ratio    1.0 


 


Test


 3/18/20


00:15 3/18/20


00:17 3/18/20


05:45 3/18/20


05:53


 


White Blood Count 22.9 x10^3/uL   22.6 x10^3/uL  


 


Hemoglobin 15.1 g/dL   14.2 g/dL  


 


Hematocrit 45.5 %   43.0 %  


 


Platelet Count 232 x10^3/uL   207 x10^3/uL  


 


Prothrombin Time 16.8 SEC    


 


Prothromb Time International


Ratio 1.4 


 


 


 





 


Sodium Level 141 mmol/L   143 mmol/L  


 


Potassium Level 5.7 mmol/L   5.9 mmol/L  


 


Chloride Level 112 mmol/L   114 mmol/L  


 


Carbon Dioxide Level 17 mmol/L   14 mmol/L  


 


Anion Gap 12   15  


 


Blood Urea Nitrogen 63 mg/dL   64 mg/dL  


 


Creatinine 5.5 mg/dL   5.9 mg/dL  


 


Estimated GFR


(Cockcroft-Gault) 8.2 


 


 7.6 


 





 


Glucose Level 129 mg/dL   167 mg/dL  


 


Lactic Acid Level 1.6 mmol/L    


 


Calcium Level 5.3 mg/dL   < 5.0 mg/dL  


 


Phosphorus Level 1.7 mg/dL   1.9 mg/dL  


 


Magnesium Level 1.3 mg/dL   2.1 mg/dL  


 


Glucose (Fingerstick)  127 mg/dL   174 mg/dL 


 


Red Blood Count   4.24 x10^6/uL  


 


Mean Corpuscular Volume   101 fL  


 


Mean Corpuscular Hemoglobin   34 pg  


 


Mean Corpuscular Hemoglobin


Concent 


 


 33 g/dL 


 





 


Red Cell Distribution Width   14.3 %  


 


Neutrophils (%) (Auto)   88 %  


 


Lymphocytes (%) (Auto)   6 %  


 


Monocytes (%) (Auto)   6 %  


 


Eosinophils (%) (Auto)   0 %  


 


Basophils (%) (Auto)   0 %  


 


Neutrophils # (Auto)   19.8 x10^3/uL  


 


Lymphocytes # (Auto)   1.3 x10^3/uL  


 


Monocytes # (Auto)   1.4 x10^3/uL  


 


Eosinophils # (Auto)   0.0 x10^3/uL  


 


Basophils # (Auto)   0.0 x10^3/uL  


 


BUN/Creatinine Ratio   11  


 


Total Bilirubin   1.3 mg/dL  


 


Aspartate Amino Transf


(AST/SGOT) 


 


 164 U/L 


 





 


Alanine Aminotransferase


(ALT/SGPT) 


 


 169 U/L 


 





 


Alkaline Phosphatase   60 U/L  


 


Total Protein   4.8 g/dL  


 


Albumin   2.1 g/dL  


 


Albumin/Globulin Ratio   0.8  


 


Lipase   3832 U/L  


 


Test


 3/18/20


09:05 


 


 





 


O2 Saturation 95 %    


 


Arterial Blood pH 7.23    


 


Arterial Blood pCO2 at


Patient Temp 26 mmHg 


 


 


 





 


Arterial Blood pO2 at Patient


Temp 77 mmHg 


 


 


 





 


Arterial Blood HCO3 11 mmol/L    


 


Arterial Blood Base Excess -15 mmol/L    


 


FiO2 35    








Imaging:


CT A/P


IMPRESSION:


Progressive inflammatory changes surrounding the pancreas consistent with acute 

pancreatitis. There is now heterogenous enhancement of the pancreas particularly

at the pancreatic neck and tail. This may represent developing necrosis. Repeat 

follow-up imaging is recommended in 4-6 days to reassess.





PE:





GEN: ill 


LUNGS: tachypneic


HEART: tachycardic


ABD: distended, quiet


NEURO/PSYCH: A & O 3





A/P:


Gallstone pancreatitis - concern for necrosis on interval CT


Leukocytosis, ARF, hypocalcemia





--


D/w Dr. Bhatia - increase PPI to BID.





Hemodynamically unstable?:  No


Is patient in severe pain?:  Yes


Is NPO status required?:  Yes











RAGHAVENDRA MURCIA         Mar 18, 2020 09:50

## 2020-03-18 NOTE — PDOC
SURGICAL PROGRESS NOTE


Subjective


seen in ICU


awake, responds


Vital Signs





Vital Signs








  Date Time  Temp Pulse Resp B/P (MAP) Pulse Ox O2 Delivery O2 Flow Rate FiO2


 


3/18/20 09:10  132  122/61 (81)    


 


3/18/20 07:52 98.1  26  95 Room Air  





 98.1       


 


3/18/20 07:00       2.0 








I&O











Intake and Output 


 


 3/18/20





 07:05


 


Intake Total 4102 ml


 


Output Total 170 ml


 


Balance 3932 ml


 


 


 


Intake Oral 0 ml


 


IV Total 4102 ml


 


Output Urine Total 170 ml


 


Bladder Scan Volume Amount 


 


# Voids 2








PATIENT HAS A VILLASENOR:  Yes


HEENT:  Atraumatic


Heart:  Other (tachycardic)


Abdomen:  Other (distended, diffusely TTP )


Labs





Laboratory Tests








Test


 3/16/20


12:42 3/16/20


17:45 3/17/20


00:40 3/17/20


03:27


 


Glucose (Fingerstick)


 224 mg/dL


(70-99) 209 mg/dL


(70-99) 212 mg/dL


(70-99) 196 mg/dL


(70-99)


 


Test


 3/17/20


05:44 3/17/20


11:48 3/17/20


16:50 3/17/20


18:02


 


Glucose (Fingerstick)


 186 mg/dL


(70-99) 231 mg/dL


(70-99) 


 232 mg/dL


(70-99)


 


White Blood Count


 


 


 22.7 x10^3/uL


(4.0-11.0) 





 


Red Blood Count


 


 


 4.12 x10^6/uL


(3.50-5.40) 





 


Hemoglobin


 


 


 13.8 g/dL


(12.0-15.5) 





 


Hematocrit


 


 


 42.0 %


(36.0-47.0) 





 


Mean Corpuscular Volume


 


 


 102 fL


() 





 


Mean Corpuscular Hemoglobin   34 pg (25-35)  


 


Mean Corpuscular Hemoglobin


Concent 


 


 33 g/dL


(31-37) 





 


Red Cell Distribution Width


 


 


 13.8 %


(11.5-14.5) 





 


Platelet Count


 


 


 212 x10^3/uL


(140-400) 





 


Test


 3/17/20


20:30 3/18/20


00:15 3/18/20


00:17 3/18/20


05:45


 


Sodium Level


 139 mmol/L


(136-145) 141 mmol/L


(136-145) 


 143 mmol/L


(136-145)


 


Potassium Level


 6.8 mmol/L


(3.5-5.1) 5.7 mmol/L


(3.5-5.1) 


 5.9 mmol/L


(3.5-5.1)


 


Chloride Level


 109 mmol/L


() 112 mmol/L


() 


 114 mmol/L


()


 


Carbon Dioxide Level


 17 mmol/L


(21-32) 17 mmol/L


(21-32) 


 14 mmol/L


(21-32)


 


Anion Gap 13 (6-14)  12 (6-14)   15 (6-14) 


 


Blood Urea Nitrogen


 56 mg/dL


(7-20) 63 mg/dL


(7-20) 


 64 mg/dL


(7-20)


 


Creatinine


 5.0 mg/dL


(0.6-1.0) 5.5 mg/dL


(0.6-1.0) 


 5.9 mg/dL


(0.6-1.0)


 


Estimated GFR


(Cockcroft-Gault) 9.2 


 8.2 


 


 7.6 





 


BUN/Creatinine Ratio 11 (6-20)    11 (6-20) 


 


Glucose Level


 196 mg/dL


(70-99) 129 mg/dL


(70-99) 


 167 mg/dL


(70-99)


 


Calcium Level


 < 5.0 mg/dL


(8.5-10.1) 5.3 mg/dL


(8.5-10.1) 


 < 5.0 mg/dL


(8.5-10.1)


 


Phosphorus Level


 1.2 mg/dL


(2.6-4.7) 1.7 mg/dL


(2.6-4.7) 


 1.9 mg/dL


(2.6-4.7)


 


Magnesium Level


 1.2 mg/dL


(1.8-2.4) 1.3 mg/dL


(1.8-2.4) 


 2.1 mg/dL


(1.8-2.4)


 


Total Bilirubin


 1.9 mg/dL


(0.2-1.0) 


 


 1.3 mg/dL


(0.2-1.0)


 


Aspartate Amino Transf


(AST/SGOT) 214 U/L


(15-37) 


 


 164 U/L


(15-37)


 


Alanine Aminotransferase


(ALT/SGPT) 263 U/L


(14-59) 


 


 169 U/L


(14-59)


 


Alkaline Phosphatase


 75 U/L


() 


 


 60 U/L


()


 


Total Protein


 5.6 g/dL


(6.4-8.2) 


 


 4.8 g/dL


(6.4-8.2)


 


Albumin


 2.8 g/dL


(3.4-5.0) 


 


 2.1 g/dL


(3.4-5.0)


 


Albumin/Globulin Ratio 1.0 (1.0-1.7)    0.8 (1.0-1.7) 


 


White Blood Count


 


 22.9 x10^3/uL


(4.0-11.0) 


 22.6 x10^3/uL


(4.0-11.0)


 


Hemoglobin


 


 15.1 g/dL


(12.0-15.5) 


 14.2 g/dL


(12.0-15.5)


 


Hematocrit


 


 45.5 %


(36.0-47.0) 


 43.0 %


(36.0-47.0)


 


Platelet Count


 


 232 x10^3/uL


(140-400) 


 207 x10^3/uL


(140-400)


 


Prothrombin Time


 


 16.8 SEC


(11.7-14.0) 


 





 


Prothromb Time International


Ratio 


 1.4 (0.8-1.1) 


 


 





 


Lactic Acid Level


 


 1.6 mmol/L


(0.4-2.0) 


 





 


Glucose (Fingerstick)


 


 


 127 mg/dL


(70-99) 





 


Red Blood Count


 


 


 


 4.24 x10^6/uL


(3.50-5.40)


 


Mean Corpuscular Volume


 


 


 


 101 fL


()


 


Mean Corpuscular Hemoglobin    34 pg (25-35) 


 


Mean Corpuscular Hemoglobin


Concent 


 


 


 33 g/dL


(31-37)


 


Red Cell Distribution Width


 


 


 


 14.3 %


(11.5-14.5)


 


Neutrophils (%) (Auto)    88 % (31-73) 


 


Lymphocytes (%) (Auto)    6 % (24-48) 


 


Monocytes (%) (Auto)    6 % (0-9) 


 


Eosinophils (%) (Auto)    0 % (0-3) 


 


Basophils (%) (Auto)    0 % (0-3) 


 


Neutrophils # (Auto)


 


 


 


 19.8 x10^3/uL


(1.8-7.7)


 


Lymphocytes # (Auto)


 


 


 


 1.3 x10^3/uL


(1.0-4.8)


 


Monocytes # (Auto)


 


 


 


 1.4 x10^3/uL


(0.0-1.1)


 


Eosinophils # (Auto)


 


 


 


 0.0 x10^3/uL


(0.0-0.7)


 


Basophils # (Auto)


 


 


 


 0.0 x10^3/uL


(0.0-0.2)


 


Lipase


 


 


 


 3832 U/L


()


 


Test


 3/18/20


05:53 3/18/20


09:05 


 





 


Glucose (Fingerstick)


 174 mg/dL


(70-99) 


 


 





 


O2 Saturation  95 % (92-99)   


 


Arterial Blood pH


 


 7.23


(7.35-7.45) 


 





 


Arterial Blood pCO2 at


Patient Temp 


 26 mmHg


(35-46) 


 





 


Arterial Blood pO2 at Patient


Temp 


 77 mmHg


() 


 





 


Arterial Blood HCO3


 


 11 mmol/L


(21-28) 


 





 


Arterial Blood Base Excess


 


 -15 mmol/L


(-3-3) 


 





 


FiO2  35   








Laboratory Tests








Test


 3/17/20


11:48 3/17/20


16:50 3/17/20


18:02 3/17/20


20:30


 


Glucose (Fingerstick)


 231 mg/dL


(70-99) 


 232 mg/dL


(70-99) 





 


White Blood Count


 


 22.7 x10^3/uL


(4.0-11.0) 


 





 


Red Blood Count


 


 4.12 x10^6/uL


(3.50-5.40) 


 





 


Hemoglobin


 


 13.8 g/dL


(12.0-15.5) 


 





 


Hematocrit


 


 42.0 %


(36.0-47.0) 


 





 


Mean Corpuscular Volume


 


 102 fL


() 


 





 


Mean Corpuscular Hemoglobin  34 pg (25-35)   


 


Mean Corpuscular Hemoglobin


Concent 


 33 g/dL


(31-37) 


 





 


Red Cell Distribution Width


 


 13.8 %


(11.5-14.5) 


 





 


Platelet Count


 


 212 x10^3/uL


(140-400) 


 





 


Sodium Level


 


 


 


 139 mmol/L


(136-145)


 


Potassium Level


 


 


 


 6.8 mmol/L


(3.5-5.1)


 


Chloride Level


 


 


 


 109 mmol/L


()


 


Carbon Dioxide Level


 


 


 


 17 mmol/L


(21-32)


 


Anion Gap    13 (6-14) 


 


Blood Urea Nitrogen


 


 


 


 56 mg/dL


(7-20)


 


Creatinine


 


 


 


 5.0 mg/dL


(0.6-1.0)


 


Estimated GFR


(Cockcroft-Gault) 


 


 


 9.2 





 


BUN/Creatinine Ratio    11 (6-20) 


 


Glucose Level


 


 


 


 196 mg/dL


(70-99)


 


Calcium Level


 


 


 


 < 5.0 mg/dL


(8.5-10.1)


 


Phosphorus Level


 


 


 


 1.2 mg/dL


(2.6-4.7)


 


Magnesium Level


 


 


 


 1.2 mg/dL


(1.8-2.4)


 


Total Bilirubin


 


 


 


 1.9 mg/dL


(0.2-1.0)


 


Aspartate Amino Transf


(AST/SGOT) 


 


 


 214 U/L


(15-37)


 


Alanine Aminotransferase


(ALT/SGPT) 


 


 


 263 U/L


(14-59)


 


Alkaline Phosphatase


 


 


 


 75 U/L


()


 


Total Protein


 


 


 


 5.6 g/dL


(6.4-8.2)


 


Albumin


 


 


 


 2.8 g/dL


(3.4-5.0)


 


Albumin/Globulin Ratio    1.0 (1.0-1.7) 


 


Test


 3/18/20


00:15 3/18/20


00:17 3/18/20


05:45 3/18/20


05:53


 


White Blood Count


 22.9 x10^3/uL


(4.0-11.0) 


 22.6 x10^3/uL


(4.0-11.0) 





 


Hemoglobin


 15.1 g/dL


(12.0-15.5) 


 14.2 g/dL


(12.0-15.5) 





 


Hematocrit


 45.5 %


(36.0-47.0) 


 43.0 %


(36.0-47.0) 





 


Platelet Count


 232 x10^3/uL


(140-400) 


 207 x10^3/uL


(140-400) 





 


Prothrombin Time


 16.8 SEC


(11.7-14.0) 


 


 





 


Prothromb Time International


Ratio 1.4 (0.8-1.1) 


 


 


 





 


Sodium Level


 141 mmol/L


(136-145) 


 143 mmol/L


(136-145) 





 


Potassium Level


 5.7 mmol/L


(3.5-5.1) 


 5.9 mmol/L


(3.5-5.1) 





 


Chloride Level


 112 mmol/L


() 


 114 mmol/L


() 





 


Carbon Dioxide Level


 17 mmol/L


(21-32) 


 14 mmol/L


(21-32) 





 


Anion Gap 12 (6-14)   15 (6-14)  


 


Blood Urea Nitrogen


 63 mg/dL


(7-20) 


 64 mg/dL


(7-20) 





 


Creatinine


 5.5 mg/dL


(0.6-1.0) 


 5.9 mg/dL


(0.6-1.0) 





 


Estimated GFR


(Cockcroft-Gault) 8.2 


 


 7.6 


 





 


Glucose Level


 129 mg/dL


(70-99) 


 167 mg/dL


(70-99) 





 


Lactic Acid Level


 1.6 mmol/L


(0.4-2.0) 


 


 





 


Calcium Level


 5.3 mg/dL


(8.5-10.1) 


 < 5.0 mg/dL


(8.5-10.1) 





 


Phosphorus Level


 1.7 mg/dL


(2.6-4.7) 


 1.9 mg/dL


(2.6-4.7) 





 


Magnesium Level


 1.3 mg/dL


(1.8-2.4) 


 2.1 mg/dL


(1.8-2.4) 





 


Glucose (Fingerstick)


 


 127 mg/dL


(70-99) 


 174 mg/dL


(70-99)


 


Red Blood Count


 


 


 4.24 x10^6/uL


(3.50-5.40) 





 


Mean Corpuscular Volume


 


 


 101 fL


() 





 


Mean Corpuscular Hemoglobin   34 pg (25-35)  


 


Mean Corpuscular Hemoglobin


Concent 


 


 33 g/dL


(31-37) 





 


Red Cell Distribution Width


 


 


 14.3 %


(11.5-14.5) 





 


Neutrophils (%) (Auto)   88 % (31-73)  


 


Lymphocytes (%) (Auto)   6 % (24-48)  


 


Monocytes (%) (Auto)   6 % (0-9)  


 


Eosinophils (%) (Auto)   0 % (0-3)  


 


Basophils (%) (Auto)   0 % (0-3)  


 


Neutrophils # (Auto)


 


 


 19.8 x10^3/uL


(1.8-7.7) 





 


Lymphocytes # (Auto)


 


 


 1.3 x10^3/uL


(1.0-4.8) 





 


Monocytes # (Auto)


 


 


 1.4 x10^3/uL


(0.0-1.1) 





 


Eosinophils # (Auto)


 


 


 0.0 x10^3/uL


(0.0-0.7) 





 


Basophils # (Auto)


 


 


 0.0 x10^3/uL


(0.0-0.2) 





 


BUN/Creatinine Ratio   11 (6-20)  


 


Total Bilirubin


 


 


 1.3 mg/dL


(0.2-1.0) 





 


Aspartate Amino Transf


(AST/SGOT) 


 


 164 U/L


(15-37) 





 


Alanine Aminotransferase


(ALT/SGPT) 


 


 169 U/L


(14-59) 





 


Alkaline Phosphatase


 


 


 60 U/L


() 





 


Total Protein


 


 


 4.8 g/dL


(6.4-8.2) 





 


Albumin


 


 


 2.1 g/dL


(3.4-5.0) 





 


Albumin/Globulin Ratio   0.8 (1.0-1.7)  


 


Lipase


 


 


 3832 U/L


() 





 


Test


 3/18/20


09:05 


 


 





 


O2 Saturation 95 % (92-99)    


 


Arterial Blood pH


 7.23


(7.35-7.45) 


 


 





 


Arterial Blood pCO2 at


Patient Temp 26 mmHg


(35-46) 


 


 





 


Arterial Blood pO2 at Patient


Temp 77 mmHg


() 


 


 





 


Arterial Blood HCO3


 11 mmol/L


(21-28) 


 


 





 


Arterial Blood Base Excess


 -15 mmol/L


(-3-3) 


 


 





 


FiO2 35    








I have reviewed the following


CT scan done yesterday


Problem List


Problems


Medical Problems:


(1) Acute pancreatitis


Status: Acute  





(2) Cholelithiasis


Status: Acute  








Assessment/Plan


gallstone pancreatitis


cholecystitis


ARF





to have catheter placed for dialysis today


no acute surgical recs


continue supportive care











KIEL BAL MD                Mar 18, 2020 09:25

## 2020-03-18 NOTE — PDOC
[facility] PROGRESS NOTE


Chief Complaint


Chief Complaint


Severe Acute pancreatitis


Acute kidney failure now requiring dialysis


Salpingo--itis


Gallstones (Calculus of gallbladder with acute cholecystitis without 

obstruction)


HTN 


Leukocytosis 


Hypoxia


Uterine fibroid


Hypoxia with respiratory failure


Intractable pain


Intractable nausea





History of Present Illness


History of Present Illness


[id number]


Patient seen and examined in the ICU


She appears extremely ill critically ill


Discussed with RN


Getting a chest x-ray now


Her sats are only 87% on nasal cannula oxygen


Chart reviewed





Vitals/I&O


Vitals/I&O:





                                   Vital Signs








  Date Time  Temp Pulse Resp B/P (MAP) Pulse Ox O2 Delivery O2 Flow Rate FiO2


 


3/18/20 11:18 101.3 133 36 139/72 (94) 98 Nasal Cannula 3.0 





 101.3       














                                    I & O   


 


 3/17/20 3/17/20 3/18/20





 15:00 23:00 07:00


 


Intake Total  2210 ml 1892 ml


 


Output Total  95 ml 75 ml


 


Balance  2115 ml 1817 ml











Physical Exam


General:  moderate distress, Other (acutely ill appearing )


Heart:  Other (tachycardic)


Lungs:  Clear


Abdomen:  Other (distended, diffusely TTP )


Extremities:  No clubbing, No cyanosis, No edema, Normal pulses, No 

tenderness/swelling


Skin:  No rashes, No breakdown, No significant lesion





Labs


Labs:





Laboratory Tests








Test


 3/17/20


11:48 3/17/20


16:50 3/17/20


18:02 3/17/20


20:30


 


Glucose (Fingerstick)


 231 mg/dL


(70-99) 


 232 mg/dL


(70-99) 





 


White Blood Count


 


 22.7 x10^3/uL


(4.0-11.0) 


 





 


Red Blood Count


 


 4.12 x10^6/uL


(3.50-5.40) 


 





 


Hemoglobin


 


 13.8 g/dL


(12.0-15.5) 


 





 


Hematocrit


 


 42.0 %


(36.0-47.0) 


 





 


Mean Corpuscular Volume


 


 102 fL


() 


 





 


Mean Corpuscular Hemoglobin  34 pg (25-35)   


 


Mean Corpuscular Hemoglobin


Concent 


 33 g/dL


(31-37) 


 





 


Red Cell Distribution Width


 


 13.8 %


(11.5-14.5) 


 





 


Platelet Count


 


 212 x10^3/uL


(140-400) 


 





 


Sodium Level


 


 


 


 139 mmol/L


(136-145)


 


Potassium Level


 


 


 


 6.8 mmol/L


(3.5-5.1)


 


Chloride Level


 


 


 


 109 mmol/L


()


 


Carbon Dioxide Level


 


 


 


 17 mmol/L


(21-32)


 


Anion Gap    13 (6-14) 


 


Blood Urea Nitrogen


 


 


 


 56 mg/dL


(7-20)


 


Creatinine


 


 


 


 5.0 mg/dL


(0.6-1.0)


 


Estimated GFR


(Cockcroft-Gault) 


 


 


 9.2 





 


BUN/Creatinine Ratio    11 (6-20) 


 


Glucose Level


 


 


 


 196 mg/dL


(70-99)


 


Calcium Level


 


 


 


 < 5.0 mg/dL


(8.5-10.1)


 


Phosphorus Level


 


 


 


 1.2 mg/dL


(2.6-4.7)


 


Magnesium Level


 


 


 


 1.2 mg/dL


(1.8-2.4)


 


Total Bilirubin


 


 


 


 1.9 mg/dL


(0.2-1.0)


 


Aspartate Amino Transf


(AST/SGOT) 


 


 


 214 U/L


(15-37)


 


Alanine Aminotransferase


(ALT/SGPT) 


 


 


 263 U/L


(14-59)


 


Alkaline Phosphatase


 


 


 


 75 U/L


()


 


Total Protein


 


 


 


 5.6 g/dL


(6.4-8.2)


 


Albumin


 


 


 


 2.8 g/dL


(3.4-5.0)


 


Albumin/Globulin Ratio    1.0 (1.0-1.7) 


 


Test


 3/18/20


00:15 3/18/20


00:17 3/18/20


05:45 3/18/20


05:53


 


White Blood Count


 22.9 x10^3/uL


(4.0-11.0) 


 22.6 x10^3/uL


(4.0-11.0) 





 


Hemoglobin


 15.1 g/dL


(12.0-15.5) 


 14.2 g/dL


(12.0-15.5) 





 


Hematocrit


 45.5 %


(36.0-47.0) 


 43.0 %


(36.0-47.0) 





 


Platelet Count


 232 x10^3/uL


(140-400) 


 207 x10^3/uL


(140-400) 





 


Prothrombin Time


 16.8 SEC


(11.7-14.0) 


 


 





 


Prothromb Time International


Ratio 1.4 (0.8-1.1) 


 


 


 





 


Sodium Level


 141 mmol/L


(136-145) 


 143 mmol/L


(136-145) 





 


Potassium Level


 5.7 mmol/L


(3.5-5.1) 


 5.9 mmol/L


(3.5-5.1) 





 


Chloride Level


 112 mmol/L


() 


 114 mmol/L


() 





 


Carbon Dioxide Level


 17 mmol/L


(21-32) 


 14 mmol/L


(21-32) 





 


Anion Gap 12 (6-14)   15 (6-14)  


 


Blood Urea Nitrogen


 63 mg/dL


(7-20) 


 64 mg/dL


(7-20) 





 


Creatinine


 5.5 mg/dL


(0.6-1.0) 


 5.9 mg/dL


(0.6-1.0) 





 


Estimated GFR


(Cockcroft-Gault) 8.2 


 


 7.6 


 





 


Glucose Level


 129 mg/dL


(70-99) 


 167 mg/dL


(70-99) 





 


Lactic Acid Level


 1.6 mmol/L


(0.4-2.0) 


 


 





 


Calcium Level


 5.3 mg/dL


(8.5-10.1) 


 < 5.0 mg/dL


(8.5-10.1) 





 


Phosphorus Level


 1.7 mg/dL


(2.6-4.7) 


 1.9 mg/dL


(2.6-4.7) 





 


Magnesium Level


 1.3 mg/dL


(1.8-2.4) 


 2.1 mg/dL


(1.8-2.4) 





 


Glucose (Fingerstick)


 


 127 mg/dL


(70-99) 


 174 mg/dL


(70-99)


 


Red Blood Count


 


 


 4.24 x10^6/uL


(3.50-5.40) 





 


Mean Corpuscular Volume


 


 


 101 fL


() 





 


Mean Corpuscular Hemoglobin   34 pg (25-35)  


 


Mean Corpuscular Hemoglobin


Concent 


 


 33 g/dL


(31-37) 





 


Red Cell Distribution Width


 


 


 14.3 %


(11.5-14.5) 





 


Neutrophils (%) (Auto)   88 % (31-73)  


 


Lymphocytes (%) (Auto)   6 % (24-48)  


 


Monocytes (%) (Auto)   6 % (0-9)  


 


Eosinophils (%) (Auto)   0 % (0-3)  


 


Basophils (%) (Auto)   0 % (0-3)  


 


Neutrophils # (Auto)


 


 


 19.8 x10^3/uL


(1.8-7.7) 





 


Lymphocytes # (Auto)


 


 


 1.3 x10^3/uL


(1.0-4.8) 





 


Monocytes # (Auto)


 


 


 1.4 x10^3/uL


(0.0-1.1) 





 


Eosinophils # (Auto)


 


 


 0.0 x10^3/uL


(0.0-0.7) 





 


Basophils # (Auto)


 


 


 0.0 x10^3/uL


(0.0-0.2) 





 


BUN/Creatinine Ratio   11 (6-20)  


 


Total Bilirubin


 


 


 1.3 mg/dL


(0.2-1.0) 





 


Aspartate Amino Transf


(AST/SGOT) 


 


 164 U/L


(15-37) 





 


Alanine Aminotransferase


(ALT/SGPT) 


 


 169 U/L


(14-59) 





 


Alkaline Phosphatase


 


 


 60 U/L


() 





 


Total Protein


 


 


 4.8 g/dL


(6.4-8.2) 





 


Albumin


 


 


 2.1 g/dL


(3.4-5.0) 





 


Albumin/Globulin Ratio   0.8 (1.0-1.7)  


 


Lipase


 


 


 3832 U/L


() 





 


Test


 3/18/20


09:05 


 


 





 


O2 Saturation 95 % (92-99)    


 


Arterial Blood pH


 7.23


(7.35-7.45) 


 


 





 


Arterial Blood pCO2 at


Patient Temp 26 mmHg


(35-46) 


 


 





 


Arterial Blood pO2 at Patient


Temp 77 mmHg


() 


 


 





 


Arterial Blood HCO3


 11 mmol/L


(21-28) 


 


 





 


Arterial Blood Base Excess


 -15 mmol/L


(-3-3) 


 


 





 


FiO2 35    











Assessment and Plan


Assessmemt and Plan


Problems


Medical Problems:


(1) Acute pancreatitis


Status: Acute  





(2) Cholelithiasis


Status: Acute  





Severe Acute pancreatitis


Acute kidney failure now requiring dialysis


Salpingo--itis


Gallstones (Calculus of gallbladder with acute cholecystitis without 

obstruction)


HTN 


Leukocytosis 


Hypoxia


Uterine fibroid


Hypoxia with respiratory failure


Intractable pain


Intractable nausea





Plan


ICU monitoring


Trend labs especially white count and lipase levels


We have consulted GI and general surgery


We'll go ahead and consult OB/GYN as well


IV antibiotics


IV fluids


CO narcotics


When necessary anti-medics


Home meds if possible


DVT prophylaxis


Full code


She is critically ill


Total time 33 minutes





Comment


Review of Relevant


I have reviewed the following items josy (where applicable) has been applied.


Medications:





Current Medications








 Medications


  (Trade)  Dose


 Ordered  Sig/Yee


 Route


 PRN Reason  Start Time


 Stop Time Status Last Admin


Dose Admin


 


 Hydromorphone HCl


  (Dilaudid)  1 mg  PRN Q3HRS  PRN


 IV


 SEVERE PAIN 7-10  3/17/20 12:00


    3/18/20 06:02





 


 Albumin Human  500 ml @ 


 125 mls/hr  1X  ONCE


 IV


   3/17/20 14:30


 3/17/20 18:32 DC 3/17/20 14:33





 


 Norepinephrine


 Bitartrate 8 mg/


 Dextrose  258 ml @ 


 17.299 mls/


 hr  CONT  PRN


 IV


 PER PROTOCOL  3/17/20 15:30


    3/17/20 15:24





 


 Sodium Chloride  1,000 ml @ 


 125 mls/hr  Q8H


 IV


   3/17/20 16:00


 3/18/20 02:42 DC 3/17/20 21:04





 


 Iohexol


  (Omnipaque 300


 Mg/ml)  60 ml  1X  ONCE


 IV


   3/17/20 17:00


 3/17/20 17:01 DC 3/17/20 17:20





 


 Meropenem 1 gm/


 Sodium Chloride  100 ml @ 


 200 mls/hr  Q8HRS


 IV


   3/17/20 20:00


 3/18/20 08:48 DC 3/18/20 05:45





 


 Furosemide


  (Lasix)  40 mg  1X  ONCE


 IVP


   3/17/20 22:30


 3/17/20 22:31 DC 3/17/20 22:12





 


 Calcium Chloride


 1000 mg/Sodium


 Chloride  110 ml @ 


 220 mls/hr  1X  ONCE


 IV


   3/17/20 22:30


 3/17/20 22:59 DC 3/17/20 22:11





 


 Albuterol Sulfate


  (Ventolin Neb


 Soln)  2.5 mg  1X  ONCE


 NEB


   3/17/20 22:30


 3/17/20 22:31 DC 3/18/20 00:56





 


 Insulin Human


 Regular


  (HumuLIN R VIAL)  5 unit  1X  ONCE


 IV


   3/17/20 22:30


 3/17/20 22:31 DC 3/17/20 22:14





 


 Magnesium Sulfate  50 ml @ 25


 mls/hr  1X  ONCE


 IV


   3/18/20 03:00


 3/18/20 04:59 DC 3/18/20 02:57





 


 Calcium Gluconate


 1000 mg/Sodium


 Chloride  110 ml @ 


 220 mls/hr  1X  ONCE


 IV


   3/18/20 03:00


 3/18/20 03:29 DC 3/18/20 02:46





 


 Sodium Chloride  1,000 ml @ 


 200 mls/hr  Q5H


 IV


   3/18/20 03:00


 3/18/20 10:21 DC 3/18/20 02:46





 


 Calcium Gluconate


 1000 mg/Sodium


 Chloride  110 ml @ 


 220 mls/hr  1X  ONCE


 IV


   3/18/20 03:30


 3/18/20 03:59 DC 3/18/20 03:21





 


 Sodium


 Bicarbonate 50


 meq/Sodium


 Chloride  1,050 ml @ 


 200 mls/hr  Q5H15M


 IV


   3/18/20 07:30


    3/18/20 09:05





 


 Calcium Gluconate


 2000 mg/Sodium


 Chloride  120 ml @ 


 220 mls/hr  1X  ONCE


 IV


   3/18/20 07:30


 3/18/20 08:02 DC 3/18/20 09:05





 


 Lidocaine HCl


  (Buffered


 Lidocaine 1%)  6 ml  1X  ONCE


 INJ


   3/18/20 10:15


 3/18/20 10:16 DC 3/18/20 10:26














Hemodynamically unstable?:  No


Is patient in severe pain?:  Yes


Is NPO status required?:  Yes











HECTOR MASON III DO           Mar 18, 2020 11:30

## 2020-03-18 NOTE — PDOC
Infectious Disease Note


Vital Signs:


Vital Signs





Vital Signs








  Date Time  Temp Pulse Resp B/P (MAP) Pulse Ox O2 Delivery O2 Flow Rate FiO2


 


3/18/20 07:52 98.1 128 26 102/62 (75) 95 Room Air  





 98.1       


 


3/18/20 07:00       2.0 











Medications:


Inpatient Meds:





Current Medications








 Medications


  (Trade)  Dose


 Ordered  Sig/Yee  Start Time


 Stop Time Status Last Admin


Dose Admin


 


 Albumin Human  500 ml @ 


 125 mls/hr  PRN BID  PRN  3/17/20 16:00


     





 


 Albuterol Sulfate


  (Ventolin Neb


 Soln)  2.5 mg  1X  ONCE  3/17/20 22:30


 3/17/20 22:31 DC 3/18/20 00:56


2.5 MG


 


 Atenolol


  (Tenormin)  100 mg  DAILY  3/17/20 09:00


 3/16/20 20:08 DC  





 


 Calcium Chloride


 1000 mg/Sodium


 Chloride  110 ml @ 


 220 mls/hr  1X  ONCE  3/17/20 22:30


 3/17/20 22:59 DC 3/17/20 22:11


220 MLS/HR


 


 Calcium Gluconate


 1000 mg/Sodium


 Chloride  110 ml @ 


 220 mls/hr  1X  ONCE  3/18/20 03:30


 3/18/20 03:59 DC 3/18/20 03:21


220 MLS/HR


 


 Calcium Gluconate


 2000 mg/Sodium


 Chloride  120 ml @ 


 220 mls/hr  1X  ONCE  3/18/20 07:30


 3/18/20 08:02 DC  





 


 Dextrose


  (Dextrose


 50%-Water Syringe)  12.5 gm  PRN Q15MIN  PRN  3/16/20 09:30


     





 


 Fentanyl Citrate


  (Fentanyl 2ml


 Vial)  100 mcg  STK-MED ONCE  3/16/20 03:18


 3/16/20 03:18 DC  





 


 Furosemide


  (Lasix)  40 mg  1X  ONCE  3/17/20 22:30


 3/17/20 22:31 DC 3/17/20 22:12


40 MG


 


 Hydromorphone HCl


  (Dilaudid)  1 mg  PRN Q3HRS  PRN  3/17/20 12:00


    3/18/20 06:02


1 MG


 


 Info


  (CONTRAST GIVEN


 -- Rx MONITORING)  1 each  PRN DAILY  PRN  3/17/20 17:00


 3/19/20 16:59   





 


 Insulin Human


 Lispro


  (HumaLOG)  0-9 UNITS  Q6HRS  3/16/20 09:30


    3/17/20 18:05


3 UNITS


 


 Insulin Human


 Regular


  (HumuLIN R VIAL)  5 unit  1X  ONCE  3/17/20 22:30


 3/17/20 22:31 DC 3/17/20 22:14


5 UNIT


 


 Iohexol


  (Omnipaque 300


 Mg/ml)  60 ml  1X  ONCE  3/17/20 17:00


 3/17/20 17:01 DC 3/17/20 17:20


60 ML


 


 Iohexol


  (Omnipaque 350


 Mg/ml)  90 ml  1X  ONCE  3/16/20 03:30


 3/16/20 03:31 DC 3/16/20 03:25


90 ML


 


 Ketorolac


 Tromethamine


  (Toradol 30mg


 Vial)  30 mg  1X  ONCE  3/16/20 03:00


 3/16/20 03:01 DC 3/16/20 02:54


30 MG


 


 Lidocaine HCl


  (Buffered


 Lidocaine 1%)  3 ml  STK-MED ONCE  3/17/20 12:55


 3/17/20 12:56 DC  





 


 Lidocaine HCl


  (Xylocaine-Mpf


 1% 2ml Vial)  2 ml  STK-MED ONCE  3/18/20 08:47


 3/18/20 08:47 DC  





 


 Magnesium Sulfate  50 ml @ 25


 mls/hr  1X  ONCE  3/18/20 03:00


 3/18/20 04:59 DC 3/18/20 02:57


25 MLS/HR


 


 Meropenem 1 gm/


 Sodium Chloride  100 ml @ 


 200 mls/hr  Q8HRS  3/17/20 20:00


 3/18/20 08:48 DC 3/18/20 05:45


200 MLS/HR


 


 Meropenem 500 mg/


 Sodium Chloride  50 ml @ 


 100 mls/hr  Q12HR  3/18/20 09:00


   UNV  





 


 Metoprolol


 Tartrate


  (Lopressor Vial)  5 mg  Q6HRS  3/17/20 10:15


    3/18/20 05:46


5 MG


 


 Morphine Sulfate


  (Morphine


 Sulfate)  2 mg  PRN Q2HR  PRN  3/16/20 05:00


 3/17/20 14:15 DC 3/17/20 12:26


2 MG


 


 Norepinephrine


 Bitartrate 8 mg/


 Dextrose  258 ml @ 


 17.299 mls/


 hr  CONT  PRN  3/17/20 15:30


    3/17/20 15:24


17.299 MLS/HR


 


 Ondansetron HCl


  (Zofran)  4 mg  PRN Q4HRS  PRN  3/16/20 09:30


    3/17/20 22:51


4 MG


 


 Pantoprazole


 Sodium


  (PROTONIX VIAL


 for IV PUSH)  40 mg  DAILYAC  3/16/20 11:30


    3/17/20 08:54


40 MG


 


 Piperacillin Sod/


 Tazobactam Sod


 4.5 gm/Sodium


 Chloride  100 ml @ 


 200 mls/hr  1X  ONCE  3/16/20 06:00


 3/16/20 06:29 DC 3/16/20 05:44


200 MLS/HR


 


 Prochlorperazine


 Edisylate


  (Compazine)  10 mg  PRN Q6HRS  PRN  3/16/20 17:45


    3/17/20 00:42


10 MG


 


 Sodium


 Bicarbonate 50


 meq/Sodium


 Chloride  1,050 ml @ 


 200 mls/hr  Q5H15M  3/18/20 07:30


     





 


 Sodium Chloride  1,000 ml @ 


 200 mls/hr  Q5H  3/18/20 03:00


    3/18/20 02:46


200 MLS/HR











Labs:


Lab





Laboratory Tests








Test


 3/17/20


11:48 3/17/20


16:50 3/17/20


18:02 3/17/20


20:30


 


Glucose (Fingerstick)


 231 mg/dL


(70-99) 


 232 mg/dL


(70-99) 





 


White Blood Count


 


 22.7 x10^3/uL


(4.0-11.0) 


 





 


Red Blood Count


 


 4.12 x10^6/uL


(3.50-5.40) 


 





 


Hemoglobin


 


 13.8 g/dL


(12.0-15.5) 


 





 


Hematocrit


 


 42.0 %


(36.0-47.0) 


 





 


Mean Corpuscular Volume


 


 102 fL


() 


 





 


Mean Corpuscular Hemoglobin  34 pg (25-35)   


 


Mean Corpuscular Hemoglobin


Concent 


 33 g/dL


(31-37) 


 





 


Red Cell Distribution Width


 


 13.8 %


(11.5-14.5) 


 





 


Platelet Count


 


 212 x10^3/uL


(140-400) 


 





 


Sodium Level


 


 


 


 139 mmol/L


(136-145)


 


Potassium Level


 


 


 


 6.8 mmol/L


(3.5-5.1)


 


Chloride Level


 


 


 


 109 mmol/L


()


 


Carbon Dioxide Level


 


 


 


 17 mmol/L


(21-32)


 


Anion Gap    13 (6-14) 


 


Blood Urea Nitrogen


 


 


 


 56 mg/dL


(7-20)


 


Creatinine


 


 


 


 5.0 mg/dL


(0.6-1.0)


 


Estimated GFR


(Cockcroft-Gault) 


 


 


 9.2 





 


BUN/Creatinine Ratio    11 (6-20) 


 


Glucose Level


 


 


 


 196 mg/dL


(70-99)


 


Calcium Level


 


 


 


 < 5.0 mg/dL


(8.5-10.1)


 


Phosphorus Level


 


 


 


 1.2 mg/dL


(2.6-4.7)


 


Magnesium Level


 


 


 


 1.2 mg/dL


(1.8-2.4)


 


Total Bilirubin


 


 


 


 1.9 mg/dL


(0.2-1.0)


 


Aspartate Amino Transf


(AST/SGOT) 


 


 


 214 U/L


(15-37)


 


Alanine Aminotransferase


(ALT/SGPT) 


 


 


 263 U/L


(14-59)


 


Alkaline Phosphatase


 


 


 


 75 U/L


()


 


Total Protein


 


 


 


 5.6 g/dL


(6.4-8.2)


 


Albumin


 


 


 


 2.8 g/dL


(3.4-5.0)


 


Albumin/Globulin Ratio    1.0 (1.0-1.7) 


 


Test


 3/18/20


00:15 3/18/20


00:17 3/18/20


05:45 3/18/20


05:53


 


White Blood Count


 22.9 x10^3/uL


(4.0-11.0) 


 22.6 x10^3/uL


(4.0-11.0) 





 


Hemoglobin


 15.1 g/dL


(12.0-15.5) 


 14.2 g/dL


(12.0-15.5) 





 


Hematocrit


 45.5 %


(36.0-47.0) 


 43.0 %


(36.0-47.0) 





 


Platelet Count


 232 x10^3/uL


(140-400) 


 207 x10^3/uL


(140-400) 





 


Prothrombin Time


 16.8 SEC


(11.7-14.0) 


 


 





 


Prothromb Time International


Ratio 1.4 (0.8-1.1) 


 


 


 





 


Sodium Level


 141 mmol/L


(136-145) 


 143 mmol/L


(136-145) 





 


Potassium Level


 5.7 mmol/L


(3.5-5.1) 


 5.9 mmol/L


(3.5-5.1) 





 


Chloride Level


 112 mmol/L


() 


 114 mmol/L


() 





 


Carbon Dioxide Level


 17 mmol/L


(21-32) 


 14 mmol/L


(21-32) 





 


Anion Gap 12 (6-14)   15 (6-14)  


 


Blood Urea Nitrogen


 63 mg/dL


(7-20) 


 64 mg/dL


(7-20) 





 


Creatinine


 5.5 mg/dL


(0.6-1.0) 


 5.9 mg/dL


(0.6-1.0) 





 


Estimated GFR


(Cockcroft-Gault) 8.2 


 


 7.6 


 





 


Glucose Level


 129 mg/dL


(70-99) 


 167 mg/dL


(70-99) 





 


Lactic Acid Level


 1.6 mmol/L


(0.4-2.0) 


 


 





 


Calcium Level


 5.3 mg/dL


(8.5-10.1) 


 < 5.0 mg/dL


(8.5-10.1) 





 


Phosphorus Level


 1.7 mg/dL


(2.6-4.7) 


 1.9 mg/dL


(2.6-4.7) 





 


Magnesium Level


 1.3 mg/dL


(1.8-2.4) 


 2.1 mg/dL


(1.8-2.4) 





 


Glucose (Fingerstick)


 


 127 mg/dL


(70-99) 


 174 mg/dL


(70-99)


 


Red Blood Count


 


 


 4.24 x10^6/uL


(3.50-5.40) 





 


Mean Corpuscular Volume


 


 


 101 fL


() 





 


Mean Corpuscular Hemoglobin   34 pg (25-35)  


 


Mean Corpuscular Hemoglobin


Concent 


 


 33 g/dL


(31-37) 





 


Red Cell Distribution Width


 


 


 14.3 %


(11.5-14.5) 





 


Neutrophils (%) (Auto)   88 % (31-73)  


 


Lymphocytes (%) (Auto)   6 % (24-48)  


 


Monocytes (%) (Auto)   6 % (0-9)  


 


Eosinophils (%) (Auto)   0 % (0-3)  


 


Basophils (%) (Auto)   0 % (0-3)  


 


Neutrophils # (Auto)


 


 


 19.8 x10^3/uL


(1.8-7.7) 





 


Lymphocytes # (Auto)


 


 


 1.3 x10^3/uL


(1.0-4.8) 





 


Monocytes # (Auto)


 


 


 1.4 x10^3/uL


(0.0-1.1) 





 


Eosinophils # (Auto)


 


 


 0.0 x10^3/uL


(0.0-0.7) 





 


Basophils # (Auto)


 


 


 0.0 x10^3/uL


(0.0-0.2) 





 


BUN/Creatinine Ratio   11 (6-20)  


 


Total Bilirubin


 


 


 1.3 mg/dL


(0.2-1.0) 





 


Aspartate Amino Transf


(AST/SGOT) 


 


 164 U/L


(15-37) 





 


Alanine Aminotransferase


(ALT/SGPT) 


 


 169 U/L


(14-59) 





 


Alkaline Phosphatase


 


 


 60 U/L


() 





 


Total Protein


 


 


 4.8 g/dL


(6.4-8.2) 





 


Albumin


 


 


 2.1 g/dL


(3.4-5.0) 





 


Albumin/Globulin Ratio   0.8 (1.0-1.7)  


 


Lipase


 


 


 3832 U/L


() 














Objective:


Assessment:


Pt seen and examined


ID consult dictated





IMP:


Acute pancreatitis, early developing necrosis


Cholelithiasis


Leucocytosis


JED,Hyperkalemia, Metabolic acidosis,hypocalcemia


Prediabetes


HTN





Plan:


Plan of Care


cont merrem ,renal dosing, pharmacy to assist 


Gen surgery, GI following


Renal team consulted


cont supportive care








Thank you











IVAN FRANZ MD           Mar 18, 2020 08:56

## 2020-03-18 NOTE — NUR
[facility] placed arterial line in left wrist.  Pt cool clammy, lethagic,  ABG's obtained,  Dr Gordillo notified of results and orders received.  Rolf bowie undated.

## 2020-03-18 NOTE — RAD
PORTABLE CHEST 1V

 

Clinical History: Dialysis catheter

 

Technique:  AP view of the chest was obtained at 3/18/2020 10:26 AM.

 

Comparison: March 17, 2020.

 

Findings:

 

There is low lung volumes with crowding of vasculature. The pulmonary 

vessels appear full and cephalized and is perihilar linear opacities right

worse than left. The right PICC is again seen unchanged is been interval 

placement of a right jugular dual-lumen catheter with its tip in the low 

SVC near the junction with the atrium. There is hazy opacity lung bases 

and obscuration diaphragm.

 

Impression:  

 

1. Right jugular line well-positioned. No pneumothorax.

 

2. Moderate pleural effusions and pulmonary vascular congestion and 

bilateral infiltrates could be CHF or pneumonia. This appears moderately 

worse.

 

Electronically signed by: Devyn Call III, MD (3/18/2020 11:01 AM) 

[id number]

## 2020-03-18 NOTE — NUR
Pharmacy TPN Dosing Note



S: JESENIA RICH is a 49 year old F Currently receiving Central Continuous TPN started 
03/18/20



B:Pertinent PMH: Necrotizing pancreatitis

Height: 5 feet, 8 inches

Weight: 77.251820 kg



Current diet: NPO 



LABS:

Sodium:    143 

Potassium: 5.9 

Chloride:  114 

Calcium:   5.0 

Corrected Calcium: 6.52 

Magnesium: 2.1 

CO2:       14 

SCr:       5.9 

Glucose:   127-174 

Albumin:   2.1 

AST:       164 

ALT:       169 



TPN FORMULA:

TPN TYPE:  Central Continuous

AMINO ACIDS:         60 gm

DEXTROSE:            195 gm

LIPIDS:              20 gm

SODIUM CHLORIDE:     90 mEq

CALCIUM:             10 mEq

MULTIPLE VITAMIN:    10 ml

TRACE ELEMENTS:      0.5 ml(s)



TPN PLAN:  

Temp HD catheter placed today and pt will have HD. Begin standard formula

on macros per RD. Omit K from TPN due to hyperkalemia. Calcium has been

replaced with a total of 5 gm IVPB today. Pt is on a sodium bicarb drip.

-BMP, Mg and Phos in the AM.





R: Begin TPN as noted above.

Will monitor electrolytes, glucose, and tolerance to TPN.



 LORENZO LESLIE [facility], 03/18/20 [id number]

## 2020-03-18 NOTE — PDOC2
CONSULT


Date of Consult


Date of Consult


DATE: 3/18/20 


TIME: 12:09





Reason for Consult


Reason for Consult:


JED





Referring Physician


Referring Physician:


MARLON





Identification/Chief Complaint


Chief Complaint


ABD PAIN





Source


Source:  Chart review, Patient





History of Present Illness


Reason for Visit:


THIS IS A 49 YR OLD WITH ABD PAIN. NOTE TO HAVE PANCREATITIS. SHE HAS HAD N/V. 

IMAGING AND LABS ARE C/W PANCREATITIS. SURGERY AND GI EVALUATION ONGOING. NO CKD

HX. BUT PT HAS BEEN HYPOTENSIVE WITH JED WITH A CR OF 5.5 AND SEVERE 

HYPERKALEMIA. UA NEG FOR NEPHRITIS. ALSO HAS MET ACIDOSIS AND ACIDEMIA. ALSO HAS

LOW CA OF 5.0. APPROPRIATE RESP COMPENSATION BUT UNABLE TO MAINTAIN A NORMAL PH.

NO NEPHROTOXINS AT HOME. SHE DID HAVE CONTRAST EXPOSURE FOR DIAGNOSTIC PURPOSES.

NO OTHER  HX REPORTED





Past Medical History


Cardiovascular:  HTN


Endocrine:  Diabetes





Past Surgical History


Past Surgical History:  Other (Breast augmentation)





Family History


Family History:  High Cholestrol, Hypertension





Social History


No


ALCOHOL:  rare


Drugs:  None





Current Problem List


Problem List


Problems


Medical Problems:


(1) Acute pancreatitis


Status: Acute  





(2) Cholelithiasis


Status: Acute  











Current Medications


Current Medications





Current Medications


Sodium Chloride 1,000 ml @  1,000 mls/hr Q1H IV  Last administered on 3/16/20at 

03:00;  Start 3/16/20 at 03:00;  Stop 3/16/20 at 03:59;  Status DC


Ondansetron HCl (Zofran) 4 mg 1X  ONCE IVP  Last administered on 3/16/20at 

03:27;  Start 3/16/20 at 03:00;  Stop 3/16/20 at 03:01;  Status DC


Morphine Sulfate (Morphine Sulfate) 4 mg 1X  ONCE IV ;  Start 3/16/20 at 03:00; 

Stop 3/16/20 at 03:01;  Status Cancel


Ketorolac Tromethamine (Toradol 30mg Vial) 30 mg 1X  ONCE IV  Last administered 

on 3/16/20at 02:54;  Start 3/16/20 at 03:00;  Stop 3/16/20 at 03:01;  Status DC


Fentanyl Citrate (Fentanyl 2ml Vial) 25 mcg 1X  ONCE IVP  Last administered on 

3/16/20at 03:23;  Start 3/16/20 at 03:30;  Stop 3/16/20 at 03:31;  Status DC


Fentanyl Citrate (Fentanyl 2ml Vial) 100 mcg [facility]-MED ONCE .ROUTE ;  Start 

3/16/20 at 03:18;  Stop 3/16/20 at 03:18;  Status DC


Iohexol (Omnipaque 350 Mg/ml) 90 ml 1X  ONCE IV  Last administered on 3/16/20at 

03:25;  Start 3/16/20 at 03:30;  Stop 3/16/20 at 03:31;  Status DC


Info (CONTRAST GIVEN -- Rx MONITORING) 1 each PRN DAILY  PRN MC SEE COMMENTS;  

Start 3/16/20 at 03:30;  Stop 3/18/20 at 03:29;  Status DC


Hydromorphone HCl (Dilaudid) 0.5 mg 1X  ONCE IV  Last administered on 3/16/20at 

03:55;  Start 3/16/20 at 04:30;  Stop 3/16/20 at 04:32;  Status DC


Ondansetron HCl (Zofran) 4 mg PRN Q8HRS  PRN IV NAUSEA/VOMITING 1ST CHOICE;  

Start 3/16/20 at 05:00;  Stop 3/16/20 at 09:27;  Status DC


Morphine Sulfate (Morphine Sulfate) 2 mg PRN Q2HR  PRN IV SEVERE PAIN 7-10 Last 

administered on 3/17/20at 12:26;  Start 3/16/20 at 05:00;  Stop 3/17/20 at 

14:15;  Status DC


Sodium Chloride 1,000 ml @  125 mls/hr Q8H IV  Last administered on 3/16/20at 

20:56;  Start 3/16/20 at 05:00;  Stop 3/17/20 at 04:59;  Status DC


Hydromorphone HCl (Dilaudid) 0.5 mg PRN Q3HRS  PRN IV SEVERE PAIN 7-10 Last 

administered on 3/17/20at 10:06;  Start 3/16/20 at 05:00;  Stop 3/17/20 at 

12:01;  Status DC


Piperacillin Sod/ Tazobactam Sod 4.5 gm/Sodium Chloride 100 ml @  200 mls/hr 1X 

ONCE IV  Last administered on 3/16/20at 05:44;  Start 3/16/20 at 06:00;  Stop 

3/16/20 at 06:29;  Status DC


Ondansetron HCl (Zofran) 4 mg PRN Q4HRS  PRN IV NAUSEA/VOMITING 1ST CHOICE Last 

administered on 3/17/20at 22:51;  Start 3/16/20 at 09:30


Insulin Human Lispro (HumaLOG) 0-9 UNITS Q6HRS SQ  Last administered on 

3/18/20at 11:42;  Start 3/16/20 at 09:30


Dextrose (Dextrose 50%-Water Syringe) 12.5 gm PRN Q15MIN  PRN IV SEE COMMENTS;  

Start 3/16/20 at 09:30


Pantoprazole Sodium (PROTONIX VIAL for IV PUSH) 40 mg DAILYAC IVP  Last adminis

tered on 3/18/20at 09:05;  Start 3/16/20 at 11:30


Prochlorperazine Edisylate (Compazine) 10 mg PRN Q6HRS  PRN IV NAUSEA/VOMITING, 

2nd CHOICE Last administered on 3/17/20at 00:42;  Start 3/16/20 at 17:45


Atenolol (Tenormin) 100 mg DAILY PO ;  Start 3/17/20 at 09:00;  Stop 3/16/20 at 

20:08;  Status DC


Metoprolol Tartrate (Lopressor Vial) 2.5 mg Q6HRS IVP  Last administered on 

3/17/20at 05:51;  Start 3/16/20 at 20:15;  Stop 3/17/20 at 10:02;  Status DC


Metoprolol Tartrate (Lopressor Vial) 5 mg Q6HRS IVP  Last administered on 

3/18/20at 05:46;  Start 3/17/20 at 10:15


Hydromorphone HCl (Dilaudid) 1 mg PRN Q3HRS  PRN IV SEVERE PAIN 7-10 Last 

administered on 3/18/20at 11:33;  Start 3/17/20 at 12:00


Lidocaine HCl (Buffered Lidocaine 1%) 3 ml STK-MED ONCE .ROUTE ;  Start 3/17/20 

at 12:55;  Stop 3/17/20 at 12:56;  Status DC


Albumin Human 500 ml @  125 mls/hr 1X  ONCE IV  Last administered on 3/17/20at 

14:33;  Start 3/17/20 at 14:30;  Stop 3/17/20 at 18:32;  Status DC


Norepinephrine Bitartrate 8 mg/ Dextrose 258 ml @  17.299 mls/ hr CONT  PRN IV 

PER PROTOCOL Last administered on 3/18/20at 11:34;  Start 3/17/20 at 15:30


Sodium Chloride 1,000 ml @  125 mls/hr Q8H IV  Last administered on 3/17/20at 

21:04;  Start 3/17/20 at 16:00;  Stop 3/18/20 at 02:42;  Status DC


Albumin Human 500 ml @  125 mls/hr PRN BID  PRN IV After every 2L NSS & BP < 

90mm;  Start 3/17/20 at 16:00


Iohexol (Omnipaque 300 Mg/ml) 60 ml 1X  ONCE IV  Last administered on 3/17/20at 

17:20;  Start 3/17/20 at 17:00;  Stop 3/17/20 at 17:01;  Status DC


Info (CONTRAST GIVEN -- Rx MONITORING) 1 each PRN DAILY  PRN MC SEE COMMENTS;  

Start 3/17/20 at 17:00;  Stop 3/19/20 at 16:59


Meropenem 1 gm/ Sodium Chloride 100 ml @  200 mls/hr Q8HRS IV  Last administered

on 3/18/20at 05:45;  Start 3/17/20 at 20:00;  Stop 3/18/20 at 08:48;  Status DC


Furosemide (Lasix) 40 mg 1X  ONCE IVP  Last administered on 3/17/20at 22:12;  

Start 3/17/20 at 22:30;  Stop 3/17/20 at 22:31;  Status DC


Calcium Chloride 1000 mg/Sodium Chloride 110 ml @  220 mls/hr 1X  ONCE IV  Last 

administered on 3/17/20at 22:11;  Start 3/17/20 at 22:30;  Stop 3/17/20 at 

22:59;  Status DC


Albuterol Sulfate (Ventolin Neb Soln) 2.5 mg 1X  ONCE NEB  Last administered on 

3/18/20at 00:56;  Start 3/17/20 at 22:30;  Stop 3/17/20 at 22:31;  Status DC


Insulin Human Regular (HumuLIN R VIAL) 5 unit 1X  ONCE IV  Last administered on 

3/17/20at 22:14;  Start 3/17/20 at 22:30;  Stop 3/17/20 at 22:31;  Status DC


Magnesium Sulfate 50 ml @ 25 mls/hr 1X  ONCE IV  Last administered on 3/18/20at 

02:57;  Start 3/18/20 at 03:00;  Stop 3/18/20 at 04:59;  Status DC


Calcium Gluconate 1000 mg/Sodium Chloride 110 ml @  220 mls/hr 1X  ONCE IV  Last

administered on 3/18/20at 02:46;  Start 3/18/20 at 03:00;  Stop 3/18/20 at 

03:29;  Status DC


Sodium Chloride 1,000 ml @  200 mls/hr Q5H IV  Last administered on 3/18/20at 

02:46;  Start 3/18/20 at 03:00;  Stop 3/18/20 at 10:21;  Status DC


Calcium Gluconate 1000 mg/Sodium Chloride 110 ml @  220 mls/hr 1X  ONCE IV  Last

administered on 3/18/20at 03:21;  Start 3/18/20 at 03:30;  Stop 3/18/20 at 

03:59;  Status DC


Sodium Bicarbonate 50 meq/Sodium Chloride 1,050 ml @  200 mls/hr Q5H15M IV  Last

administered on 3/18/20at 09:05;  Start 3/18/20 at 07:30


Calcium Gluconate 2000 mg/Sodium Chloride 120 ml @  220 mls/hr 1X  ONCE IV  Last

administered on 3/18/20at 09:05;  Start 3/18/20 at 07:30;  Stop 3/18/20 at 

08:02;  Status DC


Lidocaine HCl (Xylocaine-Mpf 1% 2ml Vial) 2 ml STK-MED ONCE .ROUTE ;  Start 

3/18/20 at 08:47;  Stop 3/18/20 at 08:47;  Status DC


Meropenem 500 mg/ Sodium Chloride 50 ml @  100 mls/hr Q12HR IV ;  Start 3/18/20 

at 18:00


Lidocaine HCl (Buffered Lidocaine 1%) 3 ml STK-MED ONCE .ROUTE ;  Start 3/18/20 

at 09:46;  Stop 3/18/20 at 09:46;  Status DC


Lidocaine HCl (Buffered Lidocaine 1%) 6 ml 1X  ONCE INJ  Last administered on 

3/18/20at 10:26;  Start 3/18/20 at 10:15;  Stop 3/18/20 at 10:16;  Status DC


Info (Tpn Per Pharmacy) 1 each PRN DAILY  PRN MC SEE COMMENTS;  Start 3/18/20 at

12:00





Active Scripts


Active


Reported


Bisoprolol Fumarate 5 Mg Tablet 10 Mg PO DAILY





Allergies


Allergies:  


Coded Allergies:  


     codeine (Verified  Allergy, Intermediate, rash, 3/16/20)





ROS


General:  YES: Chills, Fatigue, Appetite


PSYCHOLOGICAL ROS:  YES: Anxiety


Eyes:  Yes Decreased vision


ALLERGY AND IMMUNOLOGY:  YES: Seasonal Allergies


Respiratory:  YES: Cough, Shortness of breath


Gastrointestinal:  Yes Nausea, Yes Vomiting, Yes Abdominal Pain


Genitourinary:  YES Other (DECREASING UO)


Musculoskeletal:  Yes Joint Pain, Yes Muscular Weakness


Neurological:  Yes Weakness


Skin:  Yes Dry Skin





Physical Exam


General:  Alert, Cooperative, moderate distress


HEENT:  Atraumatic, PERRLA


Lungs:  Clear to auscultation, Other (TACHYPNEA)


Heart:  Regular rate


Abdomen:  Other (HYPOACTIVE WITH REMOTE BS)


Extremities:  No edema, Other (SOME CLUBBING )


Neuro:  Normal speech


Psych/Mental Status:  Other (ANXIOUS)


MUSCULOSKELETAL:  No deformity, No swelling





Vitals


VITALS





Vital Signs








  Date Time  Temp Pulse Resp B/P (MAP) Pulse Ox O2 Delivery O2 Flow Rate FiO2


 


3/18/20 11:44    128/65 (86)    


 


3/18/20 11:43  133 35  98 Nasal Cannula 3.0 


 


3/18/20 11:18 101.3       





 101.3       











Labs


Labs





Laboratory Tests








Test


 3/16/20


12:42 3/16/20


17:45 3/17/20


00:40 3/17/20


03:27


 


Glucose (Fingerstick)


 224 mg/dL


(70-99) 209 mg/dL


(70-99) 212 mg/dL


(70-99) 196 mg/dL


(70-99)


 


Test


 3/17/20


05:44 3/17/20


11:48 3/17/20


16:50 3/17/20


18:02


 


Glucose (Fingerstick)


 186 mg/dL


(70-99) 231 mg/dL


(70-99) 


 232 mg/dL


(70-99)


 


White Blood Count


 


 


 22.7 x10^3/uL


(4.0-11.0) 





 


Red Blood Count


 


 


 4.12 x10^6/uL


(3.50-5.40) 





 


Hemoglobin


 


 


 13.8 g/dL


(12.0-15.5) 





 


Hematocrit


 


 


 42.0 %


(36.0-47.0) 





 


Mean Corpuscular Volume


 


 


 102 fL


() 





 


Mean Corpuscular Hemoglobin   34 pg (25-35)  


 


Mean Corpuscular Hemoglobin


Concent 


 


 33 g/dL


(31-37) 





 


Red Cell Distribution Width


 


 


 13.8 %


(11.5-14.5) 





 


Platelet Count


 


 


 212 x10^3/uL


(140-400) 





 


Test


 3/17/20


20:30 3/18/20


00:15 3/18/20


00:17 3/18/20


05:45


 


Sodium Level


 139 mmol/L


(136-145) 141 mmol/L


(136-145) 


 143 mmol/L


(136-145)


 


Potassium Level


 6.8 mmol/L


(3.5-5.1) 5.7 mmol/L


(3.5-5.1) 


 5.9 mmol/L


(3.5-5.1)


 


Chloride Level


 109 mmol/L


() 112 mmol/L


() 


 114 mmol/L


()


 


Carbon Dioxide Level


 17 mmol/L


(21-32) 17 mmol/L


(21-32) 


 14 mmol/L


(21-32)


 


Anion Gap 13 (6-14)  12 (6-14)   15 (6-14) 


 


Blood Urea Nitrogen


 56 mg/dL


(7-20) 63 mg/dL


(7-20) 


 64 mg/dL


(7-20)


 


Creatinine


 5.0 mg/dL


(0.6-1.0) 5.5 mg/dL


(0.6-1.0) 


 5.9 mg/dL


(0.6-1.0)


 


Estimated GFR


(Cockcroft-Gault) 9.2 


 8.2 


 


 7.6 





 


BUN/Creatinine Ratio 11 (6-20)    11 (6-20) 


 


Glucose Level


 196 mg/dL


(70-99) 129 mg/dL


(70-99) 


 167 mg/dL


(70-99)


 


Calcium Level


 < 5.0 mg/dL


(8.5-10.1) 5.3 mg/dL


(8.5-10.1) 


 < 5.0 mg/dL


(8.5-10.1)


 


Phosphorus Level


 1.2 mg/dL


(2.6-4.7) 1.7 mg/dL


(2.6-4.7) 


 1.9 mg/dL


(2.6-4.7)


 


Magnesium Level


 1.2 mg/dL


(1.8-2.4) 1.3 mg/dL


(1.8-2.4) 


 2.1 mg/dL


(1.8-2.4)


 


Total Bilirubin


 1.9 mg/dL


(0.2-1.0) 


 


 1.3 mg/dL


(0.2-1.0)


 


Aspartate Amino Transf


(AST/SGOT) 214 U/L


(15-37) 


 


 164 U/L


(15-37)


 


Alanine Aminotransferase


(ALT/SGPT) 263 U/L


(14-59) 


 


 169 U/L


(14-59)


 


Alkaline Phosphatase


 75 U/L


() 


 


 60 U/L


()


 


Total Protein


 5.6 g/dL


(6.4-8.2) 


 


 4.8 g/dL


(6.4-8.2)


 


Albumin


 2.8 g/dL


(3.4-5.0) 


 


 2.1 g/dL


(3.4-5.0)


 


Albumin/Globulin Ratio 1.0 (1.0-1.7)    0.8 (1.0-1.7) 


 


White Blood Count


 


 22.9 x10^3/uL


(4.0-11.0) 


 22.6 x10^3/uL


(4.0-11.0)


 


Hemoglobin


 


 15.1 g/dL


(12.0-15.5) 


 14.2 g/dL


(12.0-15.5)


 


Hematocrit


 


 45.5 %


(36.0-47.0) 


 43.0 %


(36.0-47.0)


 


Platelet Count


 


 232 x10^3/uL


(140-400) 


 207 x10^3/uL


(140-400)


 


Prothrombin Time


 


 16.8 SEC


(11.7-14.0) 


 





 


Prothromb Time International


Ratio 


 1.4 (0.8-1.1) 


 


 





 


Lactic Acid Level


 


 1.6 mmol/L


(0.4-2.0) 


 





 


Glucose (Fingerstick)


 


 


 127 mg/dL


(70-99) 





 


Red Blood Count


 


 


 


 4.24 x10^6/uL


(3.50-5.40)


 


Mean Corpuscular Volume


 


 


 


 101 fL


()


 


Mean Corpuscular Hemoglobin    34 pg (25-35) 


 


Mean Corpuscular Hemoglobin


Concent 


 


 


 33 g/dL


(31-37)


 


Red Cell Distribution Width


 


 


 


 14.3 %


(11.5-14.5)


 


Neutrophils (%) (Auto)    88 % (31-73) 


 


Lymphocytes (%) (Auto)    6 % (24-48) 


 


Monocytes (%) (Auto)    6 % (0-9) 


 


Eosinophils (%) (Auto)    0 % (0-3) 


 


Basophils (%) (Auto)    0 % (0-3) 


 


Neutrophils # (Auto)


 


 


 


 19.8 x10^3/uL


(1.8-7.7)


 


Lymphocytes # (Auto)


 


 


 


 1.3 x10^3/uL


(1.0-4.8)


 


Monocytes # (Auto)


 


 


 


 1.4 x10^3/uL


(0.0-1.1)


 


Eosinophils # (Auto)


 


 


 


 0.0 x10^3/uL


(0.0-0.7)


 


Basophils # (Auto)


 


 


 


 0.0 x10^3/uL


(0.0-0.2)


 


Lipase


 


 


 


 3832 U/L


()


 


Test


 3/18/20


05:53 3/18/20


09:05 3/18/20


11:38 





 


Glucose (Fingerstick)


 174 mg/dL


(70-99) 


 174 mg/dL


(70-99) 





 


O2 Saturation  95 % (92-99)   


 


Arterial Blood pH


 


 7.23


(7.35-7.45) 


 





 


Arterial Blood pCO2 at


Patient Temp 


 26 mmHg


(35-46) 


 





 


Arterial Blood pO2 at Patient


Temp 


 77 mmHg


() 


 





 


Arterial Blood HCO3


 


 11 mmol/L


(21-28) 


 





 


Arterial Blood Base Excess


 


 -15 mmol/L


(-3-3) 


 





 


FiO2  35   








Laboratory Tests








Test


 3/17/20


16:50 3/17/20


18:02 3/17/20


20:30 3/18/20


00:15


 


White Blood Count


 22.7 x10^3/uL


(4.0-11.0) 


 


 22.9 x10^3/uL


(4.0-11.0)


 


Red Blood Count


 4.12 x10^6/uL


(3.50-5.40) 


 


 





 


Hemoglobin


 13.8 g/dL


(12.0-15.5) 


 


 15.1 g/dL


(12.0-15.5)


 


Hematocrit


 42.0 %


(36.0-47.0) 


 


 45.5 %


(36.0-47.0)


 


Mean Corpuscular Volume


 102 fL


() 


 


 





 


Mean Corpuscular Hemoglobin 34 pg (25-35)    


 


Mean Corpuscular Hemoglobin


Concent 33 g/dL


(31-37) 


 


 





 


Red Cell Distribution Width


 13.8 %


(11.5-14.5) 


 


 





 


Platelet Count


 212 x10^3/uL


(140-400) 


 


 232 x10^3/uL


(140-400)


 


Glucose (Fingerstick)


 


 232 mg/dL


(70-99) 


 





 


Sodium Level


 


 


 139 mmol/L


(136-145) 141 mmol/L


(136-145)


 


Potassium Level


 


 


 6.8 mmol/L


(3.5-5.1) 5.7 mmol/L


(3.5-5.1)


 


Chloride Level


 


 


 109 mmol/L


() 112 mmol/L


()


 


Carbon Dioxide Level


 


 


 17 mmol/L


(21-32) 17 mmol/L


(21-32)


 


Anion Gap   13 (6-14)  12 (6-14) 


 


Blood Urea Nitrogen


 


 


 56 mg/dL


(7-20) 63 mg/dL


(7-20)


 


Creatinine


 


 


 5.0 mg/dL


(0.6-1.0) 5.5 mg/dL


(0.6-1.0)


 


Estimated GFR


(Cockcroft-Gault) 


 


 9.2 


 8.2 





 


BUN/Creatinine Ratio   11 (6-20)  


 


Glucose Level


 


 


 196 mg/dL


(70-99) 129 mg/dL


(70-99)


 


Calcium Level


 


 


 < 5.0 mg/dL


(8.5-10.1) 5.3 mg/dL


(8.5-10.1)


 


Phosphorus Level


 


 


 1.2 mg/dL


(2.6-4.7) 1.7 mg/dL


(2.6-4.7)


 


Magnesium Level


 


 


 1.2 mg/dL


(1.8-2.4) 1.3 mg/dL


(1.8-2.4)


 


Total Bilirubin


 


 


 1.9 mg/dL


(0.2-1.0) 





 


Aspartate Amino Transf


(AST/SGOT) 


 


 214 U/L


(15-37) 





 


Alanine Aminotransferase


(ALT/SGPT) 


 


 263 U/L


(14-59) 





 


Alkaline Phosphatase


 


 


 75 U/L


() 





 


Total Protein


 


 


 5.6 g/dL


(6.4-8.2) 





 


Albumin


 


 


 2.8 g/dL


(3.4-5.0) 





 


Albumin/Globulin Ratio   1.0 (1.0-1.7)  


 


Prothrombin Time


 


 


 


 16.8 SEC


(11.7-14.0)


 


Prothromb Time International


Ratio 


 


 


 1.4 (0.8-1.1) 





 


Lactic Acid Level


 


 


 


 1.6 mmol/L


(0.4-2.0)


 


Test


 3/18/20


00:17 3/18/20


05:45 3/18/20


05:53 3/18/20


09:05


 


Glucose (Fingerstick)


 127 mg/dL


(70-99) 


 174 mg/dL


(70-99) 





 


White Blood Count


 


 22.6 x10^3/uL


(4.0-11.0) 


 





 


Red Blood Count


 


 4.24 x10^6/uL


(3.50-5.40) 


 





 


Hemoglobin


 


 14.2 g/dL


(12.0-15.5) 


 





 


Hematocrit


 


 43.0 %


(36.0-47.0) 


 





 


Mean Corpuscular Volume


 


 101 fL


() 


 





 


Mean Corpuscular Hemoglobin  34 pg (25-35)   


 


Mean Corpuscular Hemoglobin


Concent 


 33 g/dL


(31-37) 


 





 


Red Cell Distribution Width


 


 14.3 %


(11.5-14.5) 


 





 


Platelet Count


 


 207 x10^3/uL


(140-400) 


 





 


Neutrophils (%) (Auto)  88 % (31-73)   


 


Lymphocytes (%) (Auto)  6 % (24-48)   


 


Monocytes (%) (Auto)  6 % (0-9)   


 


Eosinophils (%) (Auto)  0 % (0-3)   


 


Basophils (%) (Auto)  0 % (0-3)   


 


Neutrophils # (Auto)


 


 19.8 x10^3/uL


(1.8-7.7) 


 





 


Lymphocytes # (Auto)


 


 1.3 x10^3/uL


(1.0-4.8) 


 





 


Monocytes # (Auto)


 


 1.4 x10^3/uL


(0.0-1.1) 


 





 


Eosinophils # (Auto)


 


 0.0 x10^3/uL


(0.0-0.7) 


 





 


Basophils # (Auto)


 


 0.0 x10^3/uL


(0.0-0.2) 


 





 


Sodium Level


 


 143 mmol/L


(136-145) 


 





 


Potassium Level


 


 5.9 mmol/L


(3.5-5.1) 


 





 


Chloride Level


 


 114 mmol/L


() 


 





 


Carbon Dioxide Level


 


 14 mmol/L


(21-32) 


 





 


Anion Gap  15 (6-14)   


 


Blood Urea Nitrogen


 


 64 mg/dL


(7-20) 


 





 


Creatinine


 


 5.9 mg/dL


(0.6-1.0) 


 





 


Estimated GFR


(Cockcroft-Gault) 


 7.6 


 


 





 


BUN/Creatinine Ratio  11 (6-20)   


 


Glucose Level


 


 167 mg/dL


(70-99) 


 





 


Calcium Level


 


 < 5.0 mg/dL


(8.5-10.1) 


 





 


Phosphorus Level


 


 1.9 mg/dL


(2.6-4.7) 


 





 


Magnesium Level


 


 2.1 mg/dL


(1.8-2.4) 


 





 


Total Bilirubin


 


 1.3 mg/dL


(0.2-1.0) 


 





 


Aspartate Amino Transf


(AST/SGOT) 


 164 U/L


(15-37) 


 





 


Alanine Aminotransferase


(ALT/SGPT) 


 169 U/L


(14-59) 


 





 


Alkaline Phosphatase


 


 60 U/L


() 


 





 


Total Protein


 


 4.8 g/dL


(6.4-8.2) 


 





 


Albumin


 


 2.1 g/dL


(3.4-5.0) 


 





 


Albumin/Globulin Ratio  0.8 (1.0-1.7)   


 


Lipase


 


 3832 U/L


() 


 





 


O2 Saturation    95 % (92-99) 


 


Arterial Blood pH


 


 


 


 7.23


(7.35-7.45)


 


Arterial Blood pCO2 at


Patient Temp 


 


 


 26 mmHg


(35-46)


 


Arterial Blood pO2 at Patient


Temp 


 


 


 77 mmHg


()


 


Arterial Blood HCO3


 


 


 


 11 mmol/L


(21-28)


 


Arterial Blood Base Excess


 


 


 


 -15 mmol/L


(-3-3)


 


FiO2    35 


 


Test


 3/18/20


11:38 


 


 





 


Glucose (Fingerstick)


 174 mg/dL


(70-99) 


 


 














Assessment/Plan


Assessment/Plan


IMP





[id number]


HYPERKALEMIA


ACIDOSIS AND ACIDEMIA


ACUTE REPS FAILURE


ACUTE PANCREATITIS


HYPOALBUMINEMIA


HYPOCALCEMIA





PLAN





TPN


CORRECT CA AS NEEDED


CHECK IONIZED CA


WILL NEED EMERGENT HD TO CORRECT ACID BASE AND LYTES


WILL HAVE IR PLACE TEMP HD LINE


HD TODAY


WILL USE HIGH HCO3 DIALYSATE


WILL ALSO START TPN


CONT HCO3 GTT


WILL FOLLOW











BETH HEIN MD                 Mar 18, 2020 12:26

## 2020-03-18 NOTE — CONS
DATE OF CONSULTATION:  03/18/2020



REFERRING PHYSICIAN:  Dr. Stephens.



REASON FOR CONSULTATION:  Antibiotic management for acute pancreatitis.



HISTORY OF PRESENT ILLNESS:  A 49-year-old female presented to the ER on

03/16/2020 with complaints of abdominal pain, which started 3 days prior to

admission with worsening.  She also had associated nausea and vomiting.  No

fevers or chills.  White count was elevated at 17.7 with neutrophil percent of

89.  Lipase was 38,000, creatinine was 1.2, which later on increased to 5.9. 

LFTs were elevated with bilirubin going up to 1.9.  The patient had a CT of the

abdomen and pelvis, which showed prominent pancreatic head with peripancreatic

fluid and inflammatory changes around the pancreas consistent with pancreatitis,

cholelithiasis, tubular enhancement in the left adnexa reflecting salpingitis

and 1.4 cm fibroid, small hiatal hernia.  No aneurysm or dissection involving

the thoracic or abdominal aorta.  The patient also had CTA, which showed some

dependent atelectasis, but no consolidation, infiltration, nodules or pleural

effusion, bilateral breast implants intact.  Ultrasound of the abdomen showed

prominent pancreatic head, cholelithiasis with wall thickening.  Pelvic

ultrasound showed small uterine lesion representing fibroid, small follicles

bilaterally in the ovaries, some amount of free fluid in the pelvis, prominent

veins in the left adnexa, no fallopian tube abnormality present.  The patient

had a PICC line placed in the right upper extremity.  Renal ultrasound showed

sonographically unremarkable kidney.  [place] catheter was placed.  The patient

became hypoxic last night and was transferred to ICU.  Repeat x-ray showed

hypo-extended examination of the lung with opacities in the lung base, which

could be from development of pleural effusion and airspace consolidation.  There

is also interstitial opacities bilaterally, which could be seen with edema or

interstitial infiltrate.  The patient had repeat CT done yesterday, which showed

progressive inflammatory changes surrounding the pancreas consistent with acute

pancreatitis.  There is now heterogenous enhancement of the pancreas,

particularly at the pancreatic neck and tail; this may represent developing

necrosis.  Repeat followup imaging recommended in 4-6 days to reassess.  Today,

creatinine was at 5.9, glucose is high at 167, bicarbonate is down to 14,

chloride at 114, potassium of 5.9, bilirubin is 1.3, , , alkaline

phosphatase 60, total protein 4.8, lipase is down to 3832, calcium remains low

at 5.  The patient was started on meropenem last night.  ID consult has been

requested for antibiotic management.



PAST MEDICAL HISTORY:  Hypertension, prediabetes, breast augmentation,

hyperlipidemia.



SOCIAL HISTORY:  No smoking.  Alcohol, rare.  No drugs.



CURRENT MEDICATIONS:  Meropenem, Zofran, morphine, ketorolac, Levophed low dose.

 The patient has also been on Zosyn prior to that.



ALLERGIES:  CODEINE.



REVIEW OF SYSTEMS:  Denies fevers, chills, nausea, vomiting, abdominal pain, 

symptoms.  Appears tired.



PHYSICAL EXAMINATION:

VITAL SIGNS:  Temperature 98.1 and last night was 100.3, pulse 128, respiratory

rate 26, blood pressure 102/62, oxygen saturation 95% on 2 liters.

GENERAL:  Lethargic, alert, awake, ill-appearing female in ICU bed, cooperative.

HEENT:  Normocephalic, atraumatic.  Mild icterus.  No thrush.  Oral mucosa dry.

NECK:  Supple.

LUNGS:  Decreased breath sounds at the bases.  No wheezing.

HEART:  S1, S2, tachycardia.  No murmurs.

ABDOMEN:  Soft, mildly distended.  Mild tenderness on deep palpation upper

abdomen.  No rebound, no guarding.

EXTREMITIES:  No edema, no cyanosis.

DERMATOLOGIC:  Warm and dry.  No generalized rash.  PICC line in right upper

extremity looks okay.

CENTRAL NERVOUS SYSTEM:  Alert and oriented x 3, grossly nonfocal.

PSYCHIATRIC:  Cooperative.



LABORATORY DATA:  WBC 22.6, was 22.7, hemoglobin 14.2, hematocrit 43, platelets

27, neutrophil percent is 88.  Sodium 143, potassium 5.9, chloride 114,

bicarbonate 14, BUN 64, creatinine 5.9, glucose 167, calcium less than 5,

phosphorus 1.9, magnesium 2.1, total bilirubin 1.3, , , alkaline

phosphatase 60, total protein 4.8, albumin 2.1, lipase was 38,000.  UA negative.

 INR 1.4.



IMAGING:  Chest CTA, abdomen and pelvis CTA as above.  Pelvic ultrasound as

above.  Renal ultrasound as above.  Repeat abdomen CT as above.  Chest x-ray as

above.



IMPRESSION:

1.  Acute pancreatitis with early changes on CT abdomen for developing 
pancreatic  necrosis.

2.  Cholelithiasis.

3.  Leukocytosis from above

4.  Acute kidney injury, Metabolic acidosis,Hyperkalemia, hypocalcemia, 

5.  hypoalbuminemia.

6.  Prediabetes.



RECOMMENDATIONS:

1.  Continue Merrem. will adjust dose for renal function

2.   Renal team has been consulted.

3.  Followup labs and cultures.

4.  GI and  General Surgery is following.

5.  Continue supportive care.



Discussed with Nursing staff



Thank you, Dr. Stephens, for consulting Infectious Disease to participate in this

patient's care.  If you have any questions, do not hesitate to contact me.

 



______________________________

IVAN FRANZ MD



DR:  ALBER/jakob  JOB#:  [id number] / [id number]

DD:  03/18/2020 08:54  DT:  03/18/2020 09:41

ROBLES

## 2020-03-18 NOTE — PDOC
PULMONARY PROGRESS NOTES


Vitals





Vital Signs








  Date Time  Temp Pulse Resp B/P (MAP) Pulse Ox O2 Delivery O2 Flow Rate FiO2


 


3/18/20 17:15   30     


 


3/18/20 16:50    123/69 (87)    


 


3/18/20 16:03      Room Air  


 


3/18/20 15:39  124   95  3.0 


 


3/18/20 14:07 99.7       





 99.7       








Lungs:  Clear


Labs





Laboratory Tests








Test


 3/17/20


00:40 3/17/20


03:27 3/17/20


05:44 3/17/20


11:48


 


Glucose (Fingerstick)


 212 mg/dL


(70-99) 196 mg/dL


(70-99) 186 mg/dL


(70-99) 231 mg/dL


(70-99)


 


Test


 3/17/20


16:50 3/17/20


18:02 3/17/20


20:30 3/18/20


00:15


 


White Blood Count


 22.7 x10^3/uL


(4.0-11.0) 


 


 22.9 x10^3/uL


(4.0-11.0)


 


Red Blood Count


 4.12 x10^6/uL


(3.50-5.40) 


 


 





 


Hemoglobin


 13.8 g/dL


(12.0-15.5) 


 


 15.1 g/dL


(12.0-15.5)


 


Hematocrit


 42.0 %


(36.0-47.0) 


 


 45.5 %


(36.0-47.0)


 


Mean Corpuscular Volume


 102 fL


() 


 


 





 


Mean Corpuscular Hemoglobin 34 pg (25-35)    


 


Mean Corpuscular Hemoglobin


Concent 33 g/dL


(31-37) 


 


 





 


Red Cell Distribution Width


 13.8 %


(11.5-14.5) 


 


 





 


Platelet Count


 212 x10^3/uL


(140-400) 


 


 232 x10^3/uL


(140-400)


 


Glucose (Fingerstick)


 


 232 mg/dL


(70-99) 


 





 


Sodium Level


 


 


 139 mmol/L


(136-145) 141 mmol/L


(136-145)


 


Potassium Level


 


 


 6.8 mmol/L


(3.5-5.1) 5.7 mmol/L


(3.5-5.1)


 


Chloride Level


 


 


 109 mmol/L


() 112 mmol/L


()


 


Carbon Dioxide Level


 


 


 17 mmol/L


(21-32) 17 mmol/L


(21-32)


 


Anion Gap   13 (6-14)  12 (6-14) 


 


Blood Urea Nitrogen


 


 


 56 mg/dL


(7-20) 63 mg/dL


(7-20)


 


Creatinine


 


 


 5.0 mg/dL


(0.6-1.0) 5.5 mg/dL


(0.6-1.0)


 


Estimated GFR


(Cockcroft-Gault) 


 


 9.2 


 8.2 





 


BUN/Creatinine Ratio   11 (6-20)  


 


Glucose Level


 


 


 196 mg/dL


(70-99) 129 mg/dL


(70-99)


 


Calcium Level


 


 


 < 5.0 mg/dL


(8.5-10.1) 5.3 mg/dL


(8.5-10.1)


 


Phosphorus Level


 


 


 1.2 mg/dL


(2.6-4.7) 1.7 mg/dL


(2.6-4.7)


 


Magnesium Level


 


 


 1.2 mg/dL


(1.8-2.4) 1.3 mg/dL


(1.8-2.4)


 


Total Bilirubin


 


 


 1.9 mg/dL


(0.2-1.0) 





 


Aspartate Amino Transf


(AST/SGOT) 


 


 214 U/L


(15-37) 





 


Alanine Aminotransferase


(ALT/SGPT) 


 


 263 U/L


(14-59) 





 


Alkaline Phosphatase


 


 


 75 U/L


() 





 


Total Protein


 


 


 5.6 g/dL


(6.4-8.2) 





 


Albumin


 


 


 2.8 g/dL


(3.4-5.0) 





 


Albumin/Globulin Ratio   1.0 (1.0-1.7)  


 


Prothrombin Time


 


 


 


 16.8 SEC


(11.7-14.0)


 


Prothromb Time International


Ratio 


 


 


 1.4 (0.8-1.1) 





 


Lactic Acid Level


 


 


 


 1.6 mmol/L


(0.4-2.0)


 


Test


 3/18/20


00:17 3/18/20


05:45 3/18/20


05:53 3/18/20


09:05


 


Glucose (Fingerstick)


 127 mg/dL


(70-99) 


 174 mg/dL


(70-99) 





 


White Blood Count


 


 22.6 x10^3/uL


(4.0-11.0) 


 





 


Red Blood Count


 


 4.24 x10^6/uL


(3.50-5.40) 


 





 


Hemoglobin


 


 14.2 g/dL


(12.0-15.5) 


 





 


Hematocrit


 


 43.0 %


(36.0-47.0) 


 





 


Mean Corpuscular Volume


 


 101 fL


() 


 





 


Mean Corpuscular Hemoglobin  34 pg (25-35)   


 


Mean Corpuscular Hemoglobin


Concent 


 33 g/dL


(31-37) 


 





 


Red Cell Distribution Width


 


 14.3 %


(11.5-14.5) 


 





 


Platelet Count


 


 207 x10^3/uL


(140-400) 


 





 


Neutrophils (%) (Auto)  88 % (31-73)   


 


Lymphocytes (%) (Auto)  6 % (24-48)   


 


Monocytes (%) (Auto)  6 % (0-9)   


 


Eosinophils (%) (Auto)  0 % (0-3)   


 


Basophils (%) (Auto)  0 % (0-3)   


 


Neutrophils # (Auto)


 


 19.8 x10^3/uL


(1.8-7.7) 


 





 


Lymphocytes # (Auto)


 


 1.3 x10^3/uL


(1.0-4.8) 


 





 


Monocytes # (Auto)


 


 1.4 x10^3/uL


(0.0-1.1) 


 





 


Eosinophils # (Auto)


 


 0.0 x10^3/uL


(0.0-0.7) 


 





 


Basophils # (Auto)


 


 0.0 x10^3/uL


(0.0-0.2) 


 





 


Sodium Level


 


 143 mmol/L


(136-145) 


 





 


Potassium Level


 


 5.9 mmol/L


(3.5-5.1) 


 





 


Chloride Level


 


 114 mmol/L


() 


 





 


Carbon Dioxide Level


 


 14 mmol/L


(21-32) 


 





 


Anion Gap  15 (6-14)   


 


Blood Urea Nitrogen


 


 64 mg/dL


(7-20) 


 





 


Creatinine


 


 5.9 mg/dL


(0.6-1.0) 


 





 


Estimated GFR


(Cockcroft-Gault) 


 7.6 


 


 





 


BUN/Creatinine Ratio  11 (6-20)   


 


Glucose Level


 


 167 mg/dL


(70-99) 


 





 


Calcium Level


 


 < 5.0 mg/dL


(8.5-10.1) 


 





 


Phosphorus Level


 


 1.9 mg/dL


(2.6-4.7) 


 





 


Magnesium Level


 


 2.1 mg/dL


(1.8-2.4) 


 





 


Total Bilirubin


 


 1.3 mg/dL


(0.2-1.0) 


 





 


Aspartate Amino Transf


(AST/SGOT) 


 164 U/L


(15-37) 


 





 


Alanine Aminotransferase


(ALT/SGPT) 


 169 U/L


(14-59) 


 





 


Alkaline Phosphatase


 


 60 U/L


() 


 





 


Total Protein


 


 4.8 g/dL


(6.4-8.2) 


 





 


Albumin


 


 2.1 g/dL


(3.4-5.0) 


 





 


Albumin/Globulin Ratio  0.8 (1.0-1.7)   


 


Lipase


 


 3832 U/L


() 


 





 


Hepatitis B Surface Antigen


 


 Nonreactive


(Nonreactive) 


 





 


Hepatitis B Core Total


Antibody 


 Nonreactive


(Nonreactive) 


 





 


O2 Saturation    95 % (92-99) 


 


Arterial Blood pH


 


 


 


 7.23


(7.35-7.45)


 


Arterial Blood pCO2 at


Patient Temp 


 


 


 26 mmHg


(35-46)


 


Arterial Blood pO2 at Patient


Temp 


 


 


 77 mmHg


()


 


Arterial Blood HCO3


 


 


 


 11 mmol/L


(21-28)


 


Arterial Blood Base Excess


 


 


 


 -15 mmol/L


(-3-3)


 


FiO2    35 


 


Test


 3/18/20


11:38 3/18/20


17:20 3/18/20


17:26 





 


Glucose (Fingerstick)


 174 mg/dL


(70-99) 


 99 mg/dL


(70-99) 





 


Ionized Calcium


 


 0.64 mmol/L


(1.13-1.32) 


 











Laboratory Tests








Test


 3/17/20


18:02 3/17/20


20:30 3/18/20


00:15 3/18/20


00:17


 


Glucose (Fingerstick)


 232 mg/dL


(70-99) 


 


 127 mg/dL


(70-99)


 


Sodium Level


 


 139 mmol/L


(136-145) 141 mmol/L


(136-145) 





 


Potassium Level


 


 6.8 mmol/L


(3.5-5.1) 5.7 mmol/L


(3.5-5.1) 





 


Chloride Level


 


 109 mmol/L


() 112 mmol/L


() 





 


Carbon Dioxide Level


 


 17 mmol/L


(21-32) 17 mmol/L


(21-32) 





 


Anion Gap  13 (6-14)  12 (6-14)  


 


Blood Urea Nitrogen


 


 56 mg/dL


(7-20) 63 mg/dL


(7-20) 





 


Creatinine


 


 5.0 mg/dL


(0.6-1.0) 5.5 mg/dL


(0.6-1.0) 





 


Estimated GFR


(Cockcroft-Gault) 


 9.2 


 8.2 


 





 


BUN/Creatinine Ratio  11 (6-20)   


 


Glucose Level


 


 196 mg/dL


(70-99) 129 mg/dL


(70-99) 





 


Calcium Level


 


 < 5.0 mg/dL


(8.5-10.1) 5.3 mg/dL


(8.5-10.1) 





 


Phosphorus Level


 


 1.2 mg/dL


(2.6-4.7) 1.7 mg/dL


(2.6-4.7) 





 


Magnesium Level


 


 1.2 mg/dL


(1.8-2.4) 1.3 mg/dL


(1.8-2.4) 





 


Total Bilirubin


 


 1.9 mg/dL


(0.2-1.0) 


 





 


Aspartate Amino Transf


(AST/SGOT) 


 214 U/L


(15-37) 


 





 


Alanine Aminotransferase


(ALT/SGPT) 


 263 U/L


(14-59) 


 





 


Alkaline Phosphatase


 


 75 U/L


() 


 





 


Total Protein


 


 5.6 g/dL


(6.4-8.2) 


 





 


Albumin


 


 2.8 g/dL


(3.4-5.0) 


 





 


Albumin/Globulin Ratio  1.0 (1.0-1.7)   


 


White Blood Count


 


 


 22.9 x10^3/uL


(4.0-11.0) 





 


Hemoglobin


 


 


 15.1 g/dL


(12.0-15.5) 





 


Hematocrit


 


 


 45.5 %


(36.0-47.0) 





 


Platelet Count


 


 


 232 x10^3/uL


(140-400) 





 


Prothrombin Time


 


 


 16.8 SEC


(11.7-14.0) 





 


Prothromb Time International


Ratio 


 


 1.4 (0.8-1.1) 


 





 


Lactic Acid Level


 


 


 1.6 mmol/L


(0.4-2.0) 





 


Test


 3/18/20


05:45 3/18/20


05:53 3/18/20


09:05 3/18/20


11:38


 


White Blood Count


 22.6 x10^3/uL


(4.0-11.0) 


 


 





 


Red Blood Count


 4.24 x10^6/uL


(3.50-5.40) 


 


 





 


Hemoglobin


 14.2 g/dL


(12.0-15.5) 


 


 





 


Hematocrit


 43.0 %


(36.0-47.0) 


 


 





 


Mean Corpuscular Volume


 101 fL


() 


 


 





 


Mean Corpuscular Hemoglobin 34 pg (25-35)    


 


Mean Corpuscular Hemoglobin


Concent 33 g/dL


(31-37) 


 


 





 


Red Cell Distribution Width


 14.3 %


(11.5-14.5) 


 


 





 


Platelet Count


 207 x10^3/uL


(140-400) 


 


 





 


Neutrophils (%) (Auto) 88 % (31-73)    


 


Lymphocytes (%) (Auto) 6 % (24-48)    


 


Monocytes (%) (Auto) 6 % (0-9)    


 


Eosinophils (%) (Auto) 0 % (0-3)    


 


Basophils (%) (Auto) 0 % (0-3)    


 


Neutrophils # (Auto)


 19.8 x10^3/uL


(1.8-7.7) 


 


 





 


Lymphocytes # (Auto)


 1.3 x10^3/uL


(1.0-4.8) 


 


 





 


Monocytes # (Auto)


 1.4 x10^3/uL


(0.0-1.1) 


 


 





 


Eosinophils # (Auto)


 0.0 x10^3/uL


(0.0-0.7) 


 


 





 


Basophils # (Auto)


 0.0 x10^3/uL


(0.0-0.2) 


 


 





 


Sodium Level


 143 mmol/L


(136-145) 


 


 





 


Potassium Level


 5.9 mmol/L


(3.5-5.1) 


 


 





 


Chloride Level


 114 mmol/L


() 


 


 





 


Carbon Dioxide Level


 14 mmol/L


(21-32) 


 


 





 


Anion Gap 15 (6-14)    


 


Blood Urea Nitrogen


 64 mg/dL


(7-20) 


 


 





 


Creatinine


 5.9 mg/dL


(0.6-1.0) 


 


 





 


Estimated GFR


(Cockcroft-Gault) 7.6 


 


 


 





 


BUN/Creatinine Ratio 11 (6-20)    


 


Glucose Level


 167 mg/dL


(70-99) 


 


 





 


Calcium Level


 < 5.0 mg/dL


(8.5-10.1) 


 


 





 


Phosphorus Level


 1.9 mg/dL


(2.6-4.7) 


 


 





 


Magnesium Level


 2.1 mg/dL


(1.8-2.4) 


 


 





 


Total Bilirubin


 1.3 mg/dL


(0.2-1.0) 


 


 





 


Aspartate Amino Transf


(AST/SGOT) 164 U/L


(15-37) 


 


 





 


Alanine Aminotransferase


(ALT/SGPT) 169 U/L


(14-59) 


 


 





 


Alkaline Phosphatase


 60 U/L


() 


 


 





 


Total Protein


 4.8 g/dL


(6.4-8.2) 


 


 





 


Albumin


 2.1 g/dL


(3.4-5.0) 


 


 





 


Albumin/Globulin Ratio 0.8 (1.0-1.7)    


 


Lipase


 3832 U/L


() 


 


 





 


Hepatitis B Surface Antigen


 Nonreactive


(Nonreactive) 


 


 





 


Hepatitis B Core Total


Antibody Nonreactive


(Nonreactive) 


 


 





 


Glucose (Fingerstick)


 


 174 mg/dL


(70-99) 


 174 mg/dL


(70-99)


 


O2 Saturation   95 % (92-99)  


 


Arterial Blood pH


 


 


 7.23


(7.35-7.45) 





 


Arterial Blood pCO2 at


Patient Temp 


 


 26 mmHg


(35-46) 





 


Arterial Blood pO2 at Patient


Temp 


 


 77 mmHg


() 





 


Arterial Blood HCO3


 


 


 11 mmol/L


(21-28) 





 


Arterial Blood Base Excess


 


 


 -15 mmol/L


(-3-3) 





 


FiO2   35  


 


Test


 3/18/20


17:20 3/18/20


17:26 


 





 


Ionized Calcium


 0.64 mmol/L


(1.13-1.32) 


 


 





 


Glucose (Fingerstick)


 


 99 mg/dL


(70-99) 


 











Medications





Active Scripts








 Medications  Dose


 Route/Sig


 Max Daily Dose Days Date Category


 


 Bisoprolol


 Fumarate 5 Mg


 Tablet  10 Mg


 PO DAILY


   3/16/20 Reported











Impression


.


FULL NOTE DICTATED


THANKS


WILL FOLLOW STEVE BAUM MD              Mar 18, 2020 18:01

## 2020-03-19 VITALS — DIASTOLIC BLOOD PRESSURE: 54 MMHG | SYSTOLIC BLOOD PRESSURE: 109 MMHG

## 2020-03-19 VITALS — SYSTOLIC BLOOD PRESSURE: 94 MMHG | DIASTOLIC BLOOD PRESSURE: 58 MMHG

## 2020-03-19 VITALS — DIASTOLIC BLOOD PRESSURE: 58 MMHG | SYSTOLIC BLOOD PRESSURE: 148 MMHG

## 2020-03-19 VITALS — DIASTOLIC BLOOD PRESSURE: 81 MMHG | SYSTOLIC BLOOD PRESSURE: 116 MMHG

## 2020-03-19 VITALS — DIASTOLIC BLOOD PRESSURE: 50 MMHG | SYSTOLIC BLOOD PRESSURE: 132 MMHG

## 2020-03-19 VITALS — DIASTOLIC BLOOD PRESSURE: 58 MMHG | SYSTOLIC BLOOD PRESSURE: 104 MMHG

## 2020-03-19 VITALS — SYSTOLIC BLOOD PRESSURE: 134 MMHG | DIASTOLIC BLOOD PRESSURE: 74 MMHG

## 2020-03-19 VITALS — DIASTOLIC BLOOD PRESSURE: 50 MMHG | SYSTOLIC BLOOD PRESSURE: 95 MMHG

## 2020-03-19 VITALS — SYSTOLIC BLOOD PRESSURE: 122 MMHG | DIASTOLIC BLOOD PRESSURE: 64 MMHG

## 2020-03-19 VITALS — DIASTOLIC BLOOD PRESSURE: 49 MMHG | SYSTOLIC BLOOD PRESSURE: 132 MMHG

## 2020-03-19 VITALS — DIASTOLIC BLOOD PRESSURE: 58 MMHG | SYSTOLIC BLOOD PRESSURE: 108 MMHG

## 2020-03-19 VITALS — DIASTOLIC BLOOD PRESSURE: 58 MMHG | SYSTOLIC BLOOD PRESSURE: 114 MMHG

## 2020-03-19 VITALS — DIASTOLIC BLOOD PRESSURE: 55 MMHG | SYSTOLIC BLOOD PRESSURE: 101 MMHG

## 2020-03-19 VITALS — DIASTOLIC BLOOD PRESSURE: 59 MMHG | SYSTOLIC BLOOD PRESSURE: 90 MMHG

## 2020-03-19 VITALS — DIASTOLIC BLOOD PRESSURE: 52 MMHG | SYSTOLIC BLOOD PRESSURE: 104 MMHG

## 2020-03-19 VITALS — DIASTOLIC BLOOD PRESSURE: 53 MMHG | SYSTOLIC BLOOD PRESSURE: 97 MMHG

## 2020-03-19 VITALS — SYSTOLIC BLOOD PRESSURE: 101 MMHG | DIASTOLIC BLOOD PRESSURE: 54 MMHG

## 2020-03-19 VITALS — SYSTOLIC BLOOD PRESSURE: 93 MMHG | DIASTOLIC BLOOD PRESSURE: 64 MMHG

## 2020-03-19 VITALS — DIASTOLIC BLOOD PRESSURE: 54 MMHG | SYSTOLIC BLOOD PRESSURE: 106 MMHG

## 2020-03-19 VITALS — DIASTOLIC BLOOD PRESSURE: 64 MMHG | SYSTOLIC BLOOD PRESSURE: 132 MMHG

## 2020-03-19 VITALS — SYSTOLIC BLOOD PRESSURE: 130 MMHG | DIASTOLIC BLOOD PRESSURE: 61 MMHG

## 2020-03-19 VITALS — SYSTOLIC BLOOD PRESSURE: 94 MMHG | DIASTOLIC BLOOD PRESSURE: 60 MMHG

## 2020-03-19 VITALS — SYSTOLIC BLOOD PRESSURE: 78 MMHG | DIASTOLIC BLOOD PRESSURE: 52 MMHG

## 2020-03-19 VITALS — SYSTOLIC BLOOD PRESSURE: 93 MMHG | DIASTOLIC BLOOD PRESSURE: 49 MMHG

## 2020-03-19 VITALS — DIASTOLIC BLOOD PRESSURE: 56 MMHG | SYSTOLIC BLOOD PRESSURE: 129 MMHG

## 2020-03-19 VITALS — DIASTOLIC BLOOD PRESSURE: 54 MMHG | SYSTOLIC BLOOD PRESSURE: 103 MMHG

## 2020-03-19 LAB
ALBUMIN SERPL-MCNC: 1.5 G/DL (ref 3.4–5)
ALBUMIN SERPL-MCNC: 1.8 G/DL (ref 3.4–5)
ALBUMIN/GLOB SERPL: 0.5 {RATIO} (ref 1–1.7)
ALBUMIN/GLOB SERPL: 0.9 {RATIO} (ref 1–1.7)
ALP SERPL-CCNC: 57 U/L (ref 46–116)
ALP SERPL-CCNC: 57 U/L (ref 46–116)
ALT SERPL-CCNC: 103 U/L (ref 14–59)
ALT SERPL-CCNC: 84 U/L (ref 14–59)
ANION GAP SERPL CALC-SCNC: 11 MMOL/L (ref 6–14)
ANION GAP SERPL CALC-SCNC: 13 MMOL/L (ref 6–14)
AST SERPL-CCNC: 111 U/L (ref 15–37)
AST SERPL-CCNC: 127 U/L (ref 15–37)
BASE EXCESS ABG: -6 MMOL/L (ref -3–3)
BASE EXCESS ABG: 0 MMOL/L (ref -3–3)
BASE EXCESS ABG: 0 MMOL/L (ref -3–3)
BASOPHILS # BLD AUTO: 0 X10^3/UL (ref 0–0.2)
BASOPHILS NFR BLD: 0 % (ref 0–3)
BILIRUB SERPL-MCNC: 0.7 MG/DL (ref 0.2–1)
BILIRUB SERPL-MCNC: 1 MG/DL (ref 0.2–1)
BUN SERPL-MCNC: 38 MG/DL (ref 7–20)
BUN SERPL-MCNC: 52 MG/DL (ref 7–20)
BUN/CREAT SERPL: 7 (ref 6–20)
BUN/CREAT SERPL: 9 (ref 6–20)
CALCIUM SERPL-MCNC: < 5 MG/DL (ref 8.5–10.1)
CALCIUM SERPL-MCNC: < 5 MG/DL (ref 8.5–10.1)
CHLORIDE SERPL-SCNC: 103 MMOL/L (ref 98–107)
CHLORIDE SERPL-SCNC: 104 MMOL/L (ref 98–107)
CO2 SERPL-SCNC: 23 MMOL/L (ref 21–32)
CO2 SERPL-SCNC: 25 MMOL/L (ref 21–32)
CREAT SERPL-MCNC: 5.2 MG/DL (ref 0.6–1)
CREAT SERPL-MCNC: 5.7 MG/DL (ref 0.6–1)
EOSINOPHIL NFR BLD: 0 % (ref 0–3)
EOSINOPHIL NFR BLD: 0 X10^3/UL (ref 0–0.7)
ERYTHROCYTE [DISTWIDTH] IN BLOOD BY AUTOMATED COUNT: 13.7 % (ref 11.5–14.5)
GFR SERPLBLD BASED ON 1.73 SQ M-ARVRAT: 7.9 ML/MIN
GFR SERPLBLD BASED ON 1.73 SQ M-ARVRAT: 8.8 ML/MIN
GLOBULIN SER-MCNC: 1.9 G/DL (ref 2.2–3.8)
GLOBULIN SER-MCNC: 2.8 G/DL (ref 2.2–3.8)
GLUCOSE SERPL-MCNC: 223 MG/DL (ref 70–99)
GLUCOSE SERPL-MCNC: 236 MG/DL (ref 70–99)
HCO3 BLDA-SCNC: 20 MMOL/L (ref 21–28)
HCO3 BLDA-SCNC: 23 MMOL/L (ref 21–28)
HCO3 BLDA-SCNC: 25 MMOL/L (ref 21–28)
HCT VFR BLD CALC: 34.8 % (ref 36–47)
HGB BLD-MCNC: 11.7 G/DL (ref 12–15.5)
INSPIRATION SETTING TIME VENT: 45
INSPIRATION SETTING TIME VENT: 60
INSPIRATION SETTING TIME VENT: 60
LYMPHOCYTES # BLD: 1.2 X10^3/UL (ref 1–4.8)
LYMPHOCYTES NFR BLD AUTO: 7 % (ref 24–48)
MAGNESIUM SERPL-MCNC: 1.3 MG/DL (ref 1.8–2.4)
MAGNESIUM SERPL-MCNC: 1.4 MG/DL (ref 1.8–2.4)
MCH RBC QN AUTO: 33 PG (ref 25–35)
MCHC RBC AUTO-ENTMCNC: 34 G/DL (ref 31–37)
MCV RBC AUTO: 99 FL (ref 79–100)
MONO #: 0.8 X10^3/UL (ref 0–1.1)
MONOCYTES NFR BLD: 5 % (ref 0–9)
NEUT #: 16 X10^3/UL (ref 1.8–7.7)
NEUTROPHILS NFR BLD AUTO: 88 % (ref 31–73)
PCO2 BLDA: 33 MMHG (ref 35–46)
PCO2 BLDA: 40 MMHG (ref 35–46)
PCO2 BLDA: 40 MMHG (ref 35–46)
PCO2 TEMP ADJ BLD: 42 MMHG
PH TEMP ADJ BLD: 7.3 [PH]
PHOSPHATE SERPL-MCNC: 4.4 MG/DL (ref 2.6–4.7)
PHOSPHATE SERPL-MCNC: 4.5 MG/DL (ref 2.6–4.7)
PLATELET # BLD AUTO: 162 X10^3/UL (ref 140–400)
PO2 BLDA: 79 MMHG (ref 75–108)
PO2 BLDA: 84 MMHG (ref 75–108)
PO2 BLDA: 88 MMHG (ref 75–108)
PO2 TEMP ADJ BLD: 85 MMHG
POTASSIUM SERPL-SCNC: 3.8 MMOL/L (ref 3.5–5.1)
POTASSIUM SERPL-SCNC: 3.8 MMOL/L (ref 3.5–5.1)
PROT SERPL-MCNC: 3.7 G/DL (ref 6.4–8.2)
PROT SERPL-MCNC: 4.3 G/DL (ref 6.4–8.2)
RBC # BLD AUTO: 3.53 X10^6/UL (ref 3.5–5.4)
SAO2 % BLDA: 95 % (ref 92–99)
SAO2 % BLDA: 96 % (ref 92–99)
SAO2 % BLDA: 97 % (ref 92–99)
SODIUM SERPL-SCNC: 139 MMOL/L (ref 136–145)
SODIUM SERPL-SCNC: 140 MMOL/L (ref 136–145)
WBC # BLD AUTO: 18.1 X10^3/UL (ref 4–11)

## 2020-03-19 RX ADMIN — SODIUM BICARBONATE SCH MLS/HR: 84 INJECTION, SOLUTION INTRAVENOUS at 07:32

## 2020-03-19 RX ADMIN — MEROPENEM SCH MLS/HR: 500 INJECTION, POWDER, FOR SOLUTION INTRAVENOUS at 22:25

## 2020-03-19 RX ADMIN — SODIUM BICARBONATE SCH MLS/HR: 84 INJECTION, SOLUTION INTRAVENOUS at 00:52

## 2020-03-19 RX ADMIN — METOPROLOL TARTRATE SCH MG: 5 INJECTION INTRAVENOUS at 19:00

## 2020-03-19 RX ADMIN — PANTOPRAZOLE SODIUM SCH MG: 40 INJECTION, POWDER, FOR SOLUTION INTRAVENOUS at 09:27

## 2020-03-19 RX ADMIN — INSULIN LISPRO SCH UNITS: 100 INJECTION, SOLUTION INTRAVENOUS; SUBCUTANEOUS at 11:29

## 2020-03-19 RX ADMIN — METOPROLOL TARTRATE SCH MG: 5 INJECTION INTRAVENOUS at 06:00

## 2020-03-19 RX ADMIN — MEROPENEM SCH MLS/HR: 500 INJECTION, POWDER, FOR SOLUTION INTRAVENOUS at 09:27

## 2020-03-19 RX ADMIN — HYDROMORPHONE HYDROCHLORIDE PRN MG: 2 INJECTION INTRAMUSCULAR; INTRAVENOUS; SUBCUTANEOUS at 05:53

## 2020-03-19 RX ADMIN — INSULIN LISPRO SCH UNITS: 100 INJECTION, SOLUTION INTRAVENOUS; SUBCUTANEOUS at 17:12

## 2020-03-19 RX ADMIN — SODIUM BICARBONATE SCH MLS/HR: 84 INJECTION, SOLUTION INTRAVENOUS at 12:49

## 2020-03-19 RX ADMIN — HYDROMORPHONE HYDROCHLORIDE PRN MG: 2 INJECTION INTRAMUSCULAR; INTRAVENOUS; SUBCUTANEOUS at 20:06

## 2020-03-19 RX ADMIN — METOPROLOL TARTRATE SCH MG: 5 INJECTION INTRAVENOUS at 02:18

## 2020-03-19 RX ADMIN — INSULIN LISPRO SCH UNITS: 100 INJECTION, SOLUTION INTRAVENOUS; SUBCUTANEOUS at 00:00

## 2020-03-19 RX ADMIN — HYDROMORPHONE HYDROCHLORIDE PRN MG: 2 INJECTION INTRAMUSCULAR; INTRAVENOUS; SUBCUTANEOUS at 02:44

## 2020-03-19 RX ADMIN — SODIUM BICARBONATE SCH MLS/HR: 84 INJECTION, SOLUTION INTRAVENOUS at 17:58

## 2020-03-19 RX ADMIN — INSULIN LISPRO SCH UNITS: 100 INJECTION, SOLUTION INTRAVENOUS; SUBCUTANEOUS at 06:35

## 2020-03-19 RX ADMIN — MAGNESIUM SULFATE HEPTAHYDRATE SCH MLS/HR: 500 INJECTION, SOLUTION INTRAMUSCULAR; INTRAVENOUS at 08:26

## 2020-03-19 RX ADMIN — Medication PRN EACH: at 12:32

## 2020-03-19 NOTE — PDOC
Renal-Progress Notes


Subjective Notes


Notes


AWAKE AND ALERT ON BIPAP





History of Present Illness


Hx of present illness


NO CHANGES





Vitals


Vitals





Vital Signs








  Date Time  Temp Pulse Resp B/P (MAP) Pulse Ox O2 Delivery O2 Flow Rate FiO2


 


3/19/20 12:00 100.1 138 22 148/80 (102) 97 BiPAP/CPAP  





 100.1       


 


3/19/20 05:53       6.0 








Weight


Weight [ ]





I.O.


Intake and Output











Intake and Output 


 


 3/19/20





 07:00


 


Intake Total 4138 ml


 


Output Total 85 ml


 


Balance 4053 ml


 


 


 


IV Total 4138 ml


 


Output Urine Total 85 ml











Labs


Labs





Laboratory Tests








Test


 3/18/20


17:20 3/18/20


17:26 3/19/20


00:23 3/19/20


00:55


 


Ionized Calcium


 0.64 mmol/L


(1.13-1.32) 


 


 





 


Glucose (Fingerstick)


 


 99 mg/dL


(70-99) 


 





 


O2 Saturation   95 % (92-99)  


 


Arterial Blood pH


 


 


 7.32


(7.35-7.45) 





 


Arterial Blood pH (Temp


corrected) 


 


 7.30 


 





 


Arterial Blood pCO2 at


Patient Temp 


 


 40 mmHg


(35-46) 





 


Arterial Blood pCO2 (Temp


correct) 


 


 42 mmHg 


 





 


Arterial Blood pO2 at Patient


Temp 


 


 79 mmHg


() 





 


Arterial Blood pO2 (Temp


corrected) 


 


 85 mmHg 


 





 


Arterial Blood HCO3


 


 


 20 mmol/L


(21-28) 





 


Arterial Blood Base Excess


 


 


 -6 mmol/L


(-3-3) 





 


FiO2   60  


 


Sodium Level


 


 


 


 140 mmol/L


(136-145)


 


Potassium Level


 


 


 


 3.8 mmol/L


(3.5-5.1)


 


Chloride Level


 


 


 


 104 mmol/L


()


 


Carbon Dioxide Level


 


 


 


 25 mmol/L


(21-32)


 


Anion Gap    11 (6-14) 


 


Blood Urea Nitrogen


 


 


 


 38 mg/dL


(7-20)


 


Creatinine


 


 


 


 5.2 mg/dL


(0.6-1.0)


 


Estimated GFR


(Cockcroft-Gault) 


 


 


 8.8 





 


BUN/Creatinine Ratio    7 (6-20) 


 


Glucose Level


 


 


 


 223 mg/dL


(70-99)


 


Calcium Level


 


 


 


 < 5.0 mg/dL


(8.5-10.1)


 


Phosphorus Level


 


 


 


 4.4 mg/dL


(2.6-4.7)


 


Magnesium Level


 


 


 


 1.3 mg/dL


(1.8-2.4)


 


Total Bilirubin


 


 


 


 1.0 mg/dL


(0.2-1.0)


 


Aspartate Amino Transf


(AST/SGOT) 


 


 


 127 U/L


(15-37)


 


Alanine Aminotransferase


(ALT/SGPT) 


 


 


 103 U/L


(14-59)


 


Alkaline Phosphatase


 


 


 


 57 U/L


()


 


Total Protein


 


 


 


 3.7 g/dL


(6.4-8.2)


 


Albumin


 


 


 


 1.8 g/dL


(3.4-5.0)


 


Albumin/Globulin Ratio    0.9 (1.0-1.7) 


 


Test


 3/19/20


00:58 3/19/20


06:15 3/19/20


06:18 3/19/20


11:25


 


Glucose (Fingerstick)


 204 mg/dL


(70-99) 


 201 mg/dL


(70-99) 





 


White Blood Count


 


 18.1 x10^3/uL


(4.0-11.0) 


 





 


Red Blood Count


 


 3.53 x10^6/uL


(3.50-5.40) 


 





 


Hemoglobin


 


 11.7 g/dL


(12.0-15.5) 


 





 


Hematocrit


 


 34.8 %


(36.0-47.0) 


 





 


Mean Corpuscular Volume  99 fL ()   


 


Mean Corpuscular Hemoglobin  33 pg (25-35)   


 


Mean Corpuscular Hemoglobin


Concent 


 34 g/dL


(31-37) 


 





 


Red Cell Distribution Width


 


 13.7 %


(11.5-14.5) 


 





 


Platelet Count


 


 162 x10^3/uL


(140-400) 


 





 


Neutrophils (%) (Auto)  88 % (31-73)   


 


Lymphocytes (%) (Auto)  7 % (24-48)   


 


Monocytes (%) (Auto)  5 % (0-9)   


 


Eosinophils (%) (Auto)  0 % (0-3)   


 


Basophils (%) (Auto)  0 % (0-3)   


 


Neutrophils # (Auto)


 


 16.0 x10^3/uL


(1.8-7.7) 


 





 


Lymphocytes # (Auto)


 


 1.2 x10^3/uL


(1.0-4.8) 


 





 


Monocytes # (Auto)


 


 0.8 x10^3/uL


(0.0-1.1) 


 





 


Eosinophils # (Auto)


 


 0.0 x10^3/uL


(0.0-0.7) 


 





 


Basophils # (Auto)


 


 0.0 x10^3/uL


(0.0-0.2) 


 





 


Sodium Level


 


 139 mmol/L


(136-145) 


 





 


Potassium Level


 


 3.8 mmol/L


(3.5-5.1) 


 





 


Chloride Level


 


 103 mmol/L


() 


 





 


Carbon Dioxide Level


 


 23 mmol/L


(21-32) 


 





 


Anion Gap  13 (6-14)   


 


Blood Urea Nitrogen


 


 52 mg/dL


(7-20) 


 





 


Creatinine


 


 5.7 mg/dL


(0.6-1.0) 


 





 


Estimated GFR


(Cockcroft-Gault) 


 7.9 


 


 





 


BUN/Creatinine Ratio  9 (6-20)   


 


Glucose Level


 


 236 mg/dL


(70-99) 


 





 


Calcium Level


 


 < 5.0 mg/dL


(8.5-10.1) 


 





 


Phosphorus Level


 


 4.5 mg/dL


(2.6-4.7) 


 





 


Magnesium Level


 


 1.4 mg/dL


(1.8-2.4) 


 





 


Total Bilirubin


 


 0.7 mg/dL


(0.2-1.0) 


 





 


Aspartate Amino Transf


(AST/SGOT) 


 111 U/L


(15-37) 


 





 


Alanine Aminotransferase


(ALT/SGPT) 


 84 U/L (14-59) 


 


 





 


Alkaline Phosphatase


 


 57 U/L


() 


 





 


Total Protein


 


 4.3 g/dL


(6.4-8.2) 


 





 


Albumin


 


 1.5 g/dL


(3.4-5.0) 


 





 


Albumin/Globulin Ratio  0.5 (1.0-1.7)   


 


O2 Saturation    96 % (92-99) 


 


Arterial Blood pH


 


 


 


 7.40


(7.35-7.45)


 


Arterial Blood pCO2 at


Patient Temp 


 


 


 40 mmHg


(35-46)


 


Arterial Blood pO2 at Patient


Temp 


 


 


 84 mmHg


()


 


Arterial Blood HCO3


 


 


 


 25 mmol/L


(21-28)


 


Arterial Blood Base Excess


 


 


 


 0 mmol/L


(-3-3)


 


FiO2    45 


 


Test


 3/19/20


11:26 


 


 





 


Glucose (Fingerstick)


 176 mg/dL


(70-99) 


 


 














Micro


Micro





Microbiology


3/18/20 Blood Culture - Preliminary, Resulted


          NO GROWTH AFTER 1 DAY





Review of Systems


Constitutional:  yes: other (DIFFICULT TO OBTAIN)





Physical Exam


General Appearance:  moderate distress


Skin:  warm


Respiratory:  decreased breath sounds


Heart:  S1S2


Abdomen:  soft, bowel sounds present


Genitourinary:  bladder flat


Extremities:  pulses present


Neurology:  alert





Assessment


Assessment


IMP





[id number]


HYPERKALEMIA-BETTER


ACIDOSIS AND ACIDEMIA


ACUTE REPS FAILURE


ACUTE PANCREATITIS


HYPOALBUMINEMIA


HYPOCALCEMIA





PLAN





TPN


CALCIUM REPLACEMENT


HD TO CORRECT ACID BASE AND LYTES


WILL USE HIGH HCO3 DIALYSATE


CONT HCO3 GTT


ON BIPAP


WILL PROB NEED VENT SUPPORT


D/W DR MIRANDA


WILL FOLLOW











BETH HEIN MD                 Mar 19, 2020 12:41

## 2020-03-19 NOTE — CONS
DATE OF CONSULTATION:  03/18/2020



ATTENDING PHYSICIAN:  Dr. Stephens.



REASON FOR CONSULTATION:  The patient seen in pulmonary consultation at the

request of Dr. Franco for increasing shortness of breath.



HISTORY OF PRESENT ILLNESS:  The patient is a 49-year-old that presented with

abdominal pain, currently diagnosed with gallstone pancreatitis.  Over the last

24-48 hours, she is becoming more short of air.  She had a dialysis catheter

placed today and underwent emergent dialysis for severe metabolic acidosis. 

Early on today, she had an arterial blood gas revealing a pH of 7.23, paCO2 of

26, and pO2 of 17 with a bicarb of 11.  She is currently on a bicarbonate drip. 

She actually feels better and according to the nursing staff, she looks better

than she did earlier today.  She is on oxygen supplementation.  She has semi

fallen asleep during my evaluation.  She had a T-max yesterday of 100.3.



She has been seen by Dr. Santos from the Surgery Department.  There are

currently no recommendations for surgery at this time.  She did have a chest

x-ray, which I personally reviewed.  There are bilateral effusions and vascular

congestion along with possible infiltrates compatible with pneumonia.  She is

also being seen by the Infectious Disease Service and is currently on IV Merrem.



PAST MEDICAL HISTORY:  Otherwise remarkable for hypertension, prediabetes,

breast augmentation, and hyperlipidemia.



SOCIAL HISTORY:  She does not smoke.  Denies any illicit drugs.



CURRENT MEDICATIONS:  List was reviewed.



ALLERGIES:  CODEINE.



REVIEW OF SYSTEMS:  As indicated above, otherwise, other systems were negative.



PHYSICAL EXAMINATION:

GENERAL:  The patient appeared to be ill and septic.  She was fallen asleep

during my evaluation, currently on 2 L.

HEENT:  Eyes, the sclerae were nonicteric.

NECK:  Jugular venous distention could not be assessed secondary to body

habitus.

CHEST:  Full expansion.

LUNGS:  Adequate flow with scattered rhonchi.

ABDOMEN:  Distended, diffuse pain.

EXTREMITIES:  No clubbing, no cyanosis.  Minimal edema.

NEUROLOGICAL:  The patient was slightly encephalopathic.  A detailed neuro exam

was not performed.  She did follow commands.



LABORATORY STUDIES:  White count was elevated.  Hemoglobin and hematocrit were

noted.  Arterial blood gases indicated above.  Electrolytes were deranged. 

Calcium level was low.  AST was 164, ALT was 169, these are currently coming

down.  Albumin was low at admission at 2.8, total bilirubin was 1.9 yesterday

and 1.3 today.  Lipase was 3832, lipase initially was 38,000.



IMPRESSION:

1.  Acute hypoxemic respiratory failure secondary to acute pancreatitis, sepsis,

abdominal distention, and pneumonia.

2.  Gallstone pancreatitis.

3.  Severe metabolic acidosis.

4.  Acute kidney injury.

5.  Acute gallstone pancreatitis.

6.  Hypoalbuminemia.

7.  Hypocalcemia.



PLAN:

1.  We will continue support with oxygen.

2.  IV antibiotics.

3.  Emergent hemodialysis was performed today.  The patient is actually doing

better.

4.  BiPAP.

5.  Continue IV sodium bicarbonate.

6.  Start TPN.



I do appreciate the privilege in sharing in the patient's care.

 



______________________________

STEVE MIRANDA MD



DR:  MANA/[email or web address]  JOB#:  [id number] / [id number]

DD:  03/18/2020 18:00  DT:  03/19/2020 00:36

## 2020-03-19 NOTE — PDOC
SURGICAL PROGRESS NOTE


Subjective


was sleeping soundly after some ativan


awakened easily, mildly confused


BIPAP on


Vital Signs





Vital Signs








  Date Time  Temp Pulse Resp B/P (MAP) Pulse Ox O2 Delivery O2 Flow Rate FiO2


 


3/19/20 10:04  135  97/53    


 


3/19/20 08:46     99 BiPAP/CPAP  


 


3/19/20 06:03   31     


 


3/19/20 05:53       6.0 


 


3/19/20 04:00 98.8       





 98.8       








I&O











Intake and Output 


 


 3/19/20





 07:00


 


Intake Total 4138 ml


 


Output Total 85 ml


 


Balance 4053 ml


 


 


 


IV Total 4138 ml


 


Output Urine Total 85 ml








PATIENT HAS A VILLASENOR:  Yes (minimal UO)


General:  No acute distress


HEENT:  Atraumatic, Other (some bilious staining around mouth)


Heart:  Other (increased rate)


Abdomen:  Soft, Other (mildy TTP in the RUQ, epigastrium)


Labs





Laboratory Tests








Test


 3/17/20


11:48 3/17/20


16:50 3/17/20


18:02 3/17/20


20:30


 


Glucose (Fingerstick)


 231 mg/dL


(70-99) 


 232 mg/dL


(70-99) 





 


White Blood Count


 


 22.7 x10^3/uL


(4.0-11.0) 


 





 


Red Blood Count


 


 4.12 x10^6/uL


(3.50-5.40) 


 





 


Hemoglobin


 


 13.8 g/dL


(12.0-15.5) 


 





 


Hematocrit


 


 42.0 %


(36.0-47.0) 


 





 


Mean Corpuscular Volume


 


 102 fL


() 


 





 


Mean Corpuscular Hemoglobin  34 pg (25-35)   


 


Mean Corpuscular Hemoglobin


Concent 


 33 g/dL


(31-37) 


 





 


Red Cell Distribution Width


 


 13.8 %


(11.5-14.5) 


 





 


Platelet Count


 


 212 x10^3/uL


(140-400) 


 





 


Sodium Level


 


 


 


 139 mmol/L


(136-145)


 


Potassium Level


 


 


 


 6.8 mmol/L


(3.5-5.1)


 


Chloride Level


 


 


 


 109 mmol/L


()


 


Carbon Dioxide Level


 


 


 


 17 mmol/L


(21-32)


 


Anion Gap    13 (6-14) 


 


Blood Urea Nitrogen


 


 


 


 56 mg/dL


(7-20)


 


Creatinine


 


 


 


 5.0 mg/dL


(0.6-1.0)


 


Estimated GFR


(Cockcroft-Gault) 


 


 


 9.2 





 


BUN/Creatinine Ratio    11 (6-20) 


 


Glucose Level


 


 


 


 196 mg/dL


(70-99)


 


Calcium Level


 


 


 


 < 5.0 mg/dL


(8.5-10.1)


 


Phosphorus Level


 


 


 


 1.2 mg/dL


(2.6-4.7)


 


Magnesium Level


 


 


 


 1.2 mg/dL


(1.8-2.4)


 


Total Bilirubin


 


 


 


 1.9 mg/dL


(0.2-1.0)


 


Aspartate Amino Transf


(AST/SGOT) 


 


 


 214 U/L


(15-37)


 


Alanine Aminotransferase


(ALT/SGPT) 


 


 


 263 U/L


(14-59)


 


Alkaline Phosphatase


 


 


 


 75 U/L


()


 


Total Protein


 


 


 


 5.6 g/dL


(6.4-8.2)


 


Albumin


 


 


 


 2.8 g/dL


(3.4-5.0)


 


Albumin/Globulin Ratio    1.0 (1.0-1.7) 


 


Test


 3/18/20


00:15 3/18/20


00:17 3/18/20


05:45 3/18/20


05:53


 


White Blood Count


 22.9 x10^3/uL


(4.0-11.0) 


 22.6 x10^3/uL


(4.0-11.0) 





 


Hemoglobin


 15.1 g/dL


(12.0-15.5) 


 14.2 g/dL


(12.0-15.5) 





 


Hematocrit


 45.5 %


(36.0-47.0) 


 43.0 %


(36.0-47.0) 





 


Platelet Count


 232 x10^3/uL


(140-400) 


 207 x10^3/uL


(140-400) 





 


Prothrombin Time


 16.8 SEC


(11.7-14.0) 


 


 





 


Prothromb Time International


Ratio 1.4 (0.8-1.1) 


 


 


 





 


Sodium Level


 141 mmol/L


(136-145) 


 143 mmol/L


(136-145) 





 


Potassium Level


 5.7 mmol/L


(3.5-5.1) 


 5.9 mmol/L


(3.5-5.1) 





 


Chloride Level


 112 mmol/L


() 


 114 mmol/L


() 





 


Carbon Dioxide Level


 17 mmol/L


(21-32) 


 14 mmol/L


(21-32) 





 


Anion Gap 12 (6-14)   15 (6-14)  


 


Blood Urea Nitrogen


 63 mg/dL


(7-20) 


 64 mg/dL


(7-20) 





 


Creatinine


 5.5 mg/dL


(0.6-1.0) 


 5.9 mg/dL


(0.6-1.0) 





 


Estimated GFR


(Cockcroft-Gault) 8.2 


 


 7.6 


 





 


Glucose Level


 129 mg/dL


(70-99) 


 167 mg/dL


(70-99) 





 


Lactic Acid Level


 1.6 mmol/L


(0.4-2.0) 


 


 





 


Calcium Level


 5.3 mg/dL


(8.5-10.1) 


 < 5.0 mg/dL


(8.5-10.1) 





 


Phosphorus Level


 1.7 mg/dL


(2.6-4.7) 


 1.9 mg/dL


(2.6-4.7) 





 


Magnesium Level


 1.3 mg/dL


(1.8-2.4) 


 2.1 mg/dL


(1.8-2.4) 





 


Glucose (Fingerstick)


 


 127 mg/dL


(70-99) 


 174 mg/dL


(70-99)


 


Red Blood Count


 


 


 4.24 x10^6/uL


(3.50-5.40) 





 


Mean Corpuscular Volume


 


 


 101 fL


() 





 


Mean Corpuscular Hemoglobin   34 pg (25-35)  


 


Mean Corpuscular Hemoglobin


Concent 


 


 33 g/dL


(31-37) 





 


Red Cell Distribution Width


 


 


 14.3 %


(11.5-14.5) 





 


Neutrophils (%) (Auto)   88 % (31-73)  


 


Lymphocytes (%) (Auto)   6 % (24-48)  


 


Monocytes (%) (Auto)   6 % (0-9)  


 


Eosinophils (%) (Auto)   0 % (0-3)  


 


Basophils (%) (Auto)   0 % (0-3)  


 


Neutrophils # (Auto)


 


 


 19.8 x10^3/uL


(1.8-7.7) 





 


Lymphocytes # (Auto)


 


 


 1.3 x10^3/uL


(1.0-4.8) 





 


Monocytes # (Auto)


 


 


 1.4 x10^3/uL


(0.0-1.1) 





 


Eosinophils # (Auto)


 


 


 0.0 x10^3/uL


(0.0-0.7) 





 


Basophils # (Auto)


 


 


 0.0 x10^3/uL


(0.0-0.2) 





 


BUN/Creatinine Ratio   11 (6-20)  


 


Total Bilirubin


 


 


 1.3 mg/dL


(0.2-1.0) 





 


Aspartate Amino Transf


(AST/SGOT) 


 


 164 U/L


(15-37) 





 


Alanine Aminotransferase


(ALT/SGPT) 


 


 169 U/L


(14-59) 





 


Alkaline Phosphatase


 


 


 60 U/L


() 





 


Total Protein


 


 


 4.8 g/dL


(6.4-8.2) 





 


Albumin


 


 


 2.1 g/dL


(3.4-5.0) 





 


Albumin/Globulin Ratio   0.8 (1.0-1.7)  


 


Lipase


 


 


 3832 U/L


() 





 


Hepatitis B Surface Antigen


 


 


 Nonreactive


(Nonreactive) 





 


Hepatitis B Core Total


Antibody 


 


 Nonreactive


(Nonreactive) 





 


Test


 3/18/20


09:05 3/18/20


11:38 3/18/20


17:20 3/18/20


17:26


 


O2 Saturation 95 % (92-99)    


 


Arterial Blood pH


 7.23


(7.35-7.45) 


 


 





 


Arterial Blood pCO2 at


Patient Temp 26 mmHg


(35-46) 


 


 





 


Arterial Blood pO2 at Patient


Temp 77 mmHg


() 


 


 





 


Arterial Blood HCO3


 11 mmol/L


(21-28) 


 


 





 


Arterial Blood Base Excess


 -15 mmol/L


(-3-3) 


 


 





 


FiO2 35    


 


Glucose (Fingerstick)


 


 174 mg/dL


(70-99) 


 99 mg/dL


(70-99)


 


Ionized Calcium


 


 


 0.64 mmol/L


(1.13-1.32) 





 


Test


 3/19/20


00:23 3/19/20


00:55 3/19/20


00:58 3/19/20


06:15


 


O2 Saturation 95 % (92-99)    


 


Arterial Blood pH


 7.32


(7.35-7.45) 


 


 





 


Arterial Blood pH (Temp


corrected) 7.30 


 


 


 





 


Arterial Blood pCO2 at


Patient Temp 40 mmHg


(35-46) 


 


 





 


Arterial Blood pCO2 (Temp


correct) 42 mmHg 


 


 


 





 


Arterial Blood pO2 at Patient


Temp 79 mmHg


() 


 


 





 


Arterial Blood pO2 (Temp


corrected) 85 mmHg 


 


 


 





 


Arterial Blood HCO3


 20 mmol/L


(21-28) 


 


 





 


Arterial Blood Base Excess


 -6 mmol/L


(-3-3) 


 


 





 


FiO2 60    


 


Sodium Level


 


 140 mmol/L


(136-145) 


 139 mmol/L


(136-145)


 


Potassium Level


 


 3.8 mmol/L


(3.5-5.1) 


 3.8 mmol/L


(3.5-5.1)


 


Chloride Level


 


 104 mmol/L


() 


 103 mmol/L


()


 


Carbon Dioxide Level


 


 25 mmol/L


(21-32) 


 23 mmol/L


(21-32)


 


Anion Gap  11 (6-14)   13 (6-14) 


 


Blood Urea Nitrogen


 


 38 mg/dL


(7-20) 


 52 mg/dL


(7-20)


 


Creatinine


 


 5.2 mg/dL


(0.6-1.0) 


 5.7 mg/dL


(0.6-1.0)


 


Estimated GFR


(Cockcroft-Gault) 


 8.8 


 


 7.9 





 


BUN/Creatinine Ratio  7 (6-20)   9 (6-20) 


 


Glucose Level


 


 223 mg/dL


(70-99) 


 236 mg/dL


(70-99)


 


Calcium Level


 


 < 5.0 mg/dL


(8.5-10.1) 


 < 5.0 mg/dL


(8.5-10.1)


 


Phosphorus Level


 


 4.4 mg/dL


(2.6-4.7) 


 4.5 mg/dL


(2.6-4.7)


 


Magnesium Level


 


 1.3 mg/dL


(1.8-2.4) 


 1.4 mg/dL


(1.8-2.4)


 


Total Bilirubin


 


 1.0 mg/dL


(0.2-1.0) 


 0.7 mg/dL


(0.2-1.0)


 


Aspartate Amino Transf


(AST/SGOT) 


 127 U/L


(15-37) 


 111 U/L


(15-37)


 


Alanine Aminotransferase


(ALT/SGPT) 


 103 U/L


(14-59) 


 84 U/L (14-59) 





 


Alkaline Phosphatase


 


 57 U/L


() 


 57 U/L


()


 


Total Protein


 


 3.7 g/dL


(6.4-8.2) 


 4.3 g/dL


(6.4-8.2)


 


Albumin


 


 1.8 g/dL


(3.4-5.0) 


 1.5 g/dL


(3.4-5.0)


 


Albumin/Globulin Ratio  0.9 (1.0-1.7)   0.5 (1.0-1.7) 


 


Glucose (Fingerstick)


 


 


 204 mg/dL


(70-99) 





 


White Blood Count


 


 


 


 18.1 x10^3/uL


(4.0-11.0)


 


Red Blood Count


 


 


 


 3.53 x10^6/uL


(3.50-5.40)


 


Hemoglobin


 


 


 


 11.7 g/dL


(12.0-15.5)


 


Hematocrit


 


 


 


 34.8 %


(36.0-47.0)


 


Mean Corpuscular Volume    99 fL () 


 


Mean Corpuscular Hemoglobin    33 pg (25-35) 


 


Mean Corpuscular Hemoglobin


Concent 


 


 


 34 g/dL


(31-37)


 


Red Cell Distribution Width


 


 


 


 13.7 %


(11.5-14.5)


 


Platelet Count


 


 


 


 162 x10^3/uL


(140-400)


 


Neutrophils (%) (Auto)    88 % (31-73) 


 


Lymphocytes (%) (Auto)    7 % (24-48) 


 


Monocytes (%) (Auto)    5 % (0-9) 


 


Eosinophils (%) (Auto)    0 % (0-3) 


 


Basophils (%) (Auto)    0 % (0-3) 


 


Neutrophils # (Auto)


 


 


 


 16.0 x10^3/uL


(1.8-7.7)


 


Lymphocytes # (Auto)


 


 


 


 1.2 x10^3/uL


(1.0-4.8)


 


Monocytes # (Auto)


 


 


 


 0.8 x10^3/uL


(0.0-1.1)


 


Eosinophils # (Auto)


 


 


 


 0.0 x10^3/uL


(0.0-0.7)


 


Basophils # (Auto)


 


 


 


 0.0 x10^3/uL


(0.0-0.2)


 


Test


 3/19/20


06:18 


 


 





 


Glucose (Fingerstick)


 201 mg/dL


(70-99) 


 


 











Laboratory Tests








Test


 3/18/20


11:38 3/18/20


17:20 3/18/20


17:26 3/19/20


00:23


 


Glucose (Fingerstick)


 174 mg/dL


(70-99) 


 99 mg/dL


(70-99) 





 


Ionized Calcium


 


 0.64 mmol/L


(1.13-1.32) 


 





 


O2 Saturation    95 % (92-99) 


 


Arterial Blood pH


 


 


 


 7.32


(7.35-7.45)


 


Arterial Blood pH (Temp


corrected) 


 


 


 7.30 





 


Arterial Blood pCO2 at


Patient Temp 


 


 


 40 mmHg


(35-46)


 


Arterial Blood pCO2 (Temp


correct) 


 


 


 42 mmHg 





 


Arterial Blood pO2 at Patient


Temp 


 


 


 79 mmHg


()


 


Arterial Blood pO2 (Temp


corrected) 


 


 


 85 mmHg 





 


Arterial Blood HCO3


 


 


 


 20 mmol/L


(21-28)


 


Arterial Blood Base Excess


 


 


 


 -6 mmol/L


(-3-3)


 


FiO2    60 


 


Test


 3/19/20


00:55 3/19/20


00:58 3/19/20


06:15 3/19/20


06:18


 


Sodium Level


 140 mmol/L


(136-145) 


 139 mmol/L


(136-145) 





 


Potassium Level


 3.8 mmol/L


(3.5-5.1) 


 3.8 mmol/L


(3.5-5.1) 





 


Chloride Level


 104 mmol/L


() 


 103 mmol/L


() 





 


Carbon Dioxide Level


 25 mmol/L


(21-32) 


 23 mmol/L


(21-32) 





 


Anion Gap 11 (6-14)   13 (6-14)  


 


Blood Urea Nitrogen


 38 mg/dL


(7-20) 


 52 mg/dL


(7-20) 





 


Creatinine


 5.2 mg/dL


(0.6-1.0) 


 5.7 mg/dL


(0.6-1.0) 





 


Estimated GFR


(Cockcroft-Gault) 8.8 


 


 7.9 


 





 


BUN/Creatinine Ratio 7 (6-20)   9 (6-20)  


 


Glucose Level


 223 mg/dL


(70-99) 


 236 mg/dL


(70-99) 





 


Calcium Level


 < 5.0 mg/dL


(8.5-10.1) 


 < 5.0 mg/dL


(8.5-10.1) 





 


Phosphorus Level


 4.4 mg/dL


(2.6-4.7) 


 4.5 mg/dL


(2.6-4.7) 





 


Magnesium Level


 1.3 mg/dL


(1.8-2.4) 


 1.4 mg/dL


(1.8-2.4) 





 


Total Bilirubin


 1.0 mg/dL


(0.2-1.0) 


 0.7 mg/dL


(0.2-1.0) 





 


Aspartate Amino Transf


(AST/SGOT) 127 U/L


(15-37) 


 111 U/L


(15-37) 





 


Alanine Aminotransferase


(ALT/SGPT) 103 U/L


(14-59) 


 84 U/L (14-59) 


 





 


Alkaline Phosphatase


 57 U/L


() 


 57 U/L


() 





 


Total Protein


 3.7 g/dL


(6.4-8.2) 


 4.3 g/dL


(6.4-8.2) 





 


Albumin


 1.8 g/dL


(3.4-5.0) 


 1.5 g/dL


(3.4-5.0) 





 


Albumin/Globulin Ratio 0.9 (1.0-1.7)   0.5 (1.0-1.7)  


 


Glucose (Fingerstick)


 


 204 mg/dL


(70-99) 


 201 mg/dL


(70-99)


 


White Blood Count


 


 


 18.1 x10^3/uL


(4.0-11.0) 





 


Red Blood Count


 


 


 3.53 x10^6/uL


(3.50-5.40) 





 


Hemoglobin


 


 


 11.7 g/dL


(12.0-15.5) 





 


Hematocrit


 


 


 34.8 %


(36.0-47.0) 





 


Mean Corpuscular Volume   99 fL ()  


 


Mean Corpuscular Hemoglobin   33 pg (25-35)  


 


Mean Corpuscular Hemoglobin


Concent 


 


 34 g/dL


(31-37) 





 


Red Cell Distribution Width


 


 


 13.7 %


(11.5-14.5) 





 


Platelet Count


 


 


 162 x10^3/uL


(140-400) 





 


Neutrophils (%) (Auto)   88 % (31-73)  


 


Lymphocytes (%) (Auto)   7 % (24-48)  


 


Monocytes (%) (Auto)   5 % (0-9)  


 


Eosinophils (%) (Auto)   0 % (0-3)  


 


Basophils (%) (Auto)   0 % (0-3)  


 


Neutrophils # (Auto)


 


 


 16.0 x10^3/uL


(1.8-7.7) 





 


Lymphocytes # (Auto)


 


 


 1.2 x10^3/uL


(1.0-4.8) 





 


Monocytes # (Auto)


 


 


 0.8 x10^3/uL


(0.0-1.1) 





 


Eosinophils # (Auto)


 


 


 0.0 x10^3/uL


(0.0-0.7) 





 


Basophils # (Auto)


 


 


 0.0 x10^3/uL


(0.0-0.2) 











Problem List


Problems


Medical Problems:


(1) Acute pancreatitis


Status: Acute  





(2) Cholelithiasis


Status: Acute  








Assessment/Plan


gallstone pancreatitis, severe


ARF


respiratory compromise





continue supportive care


no acute surgical recs











KIEL BAL MD                Mar 19, 2020 11:05

## 2020-03-19 NOTE — RAD
Procedure: Ultrasound-guided placement of right internal jugular central

venous catheter3/19/2020 6:04 AM



Clinical Indication:  need dialysis catheter placed, RENAL FAILURE



Discussion:





The risks and benefits of the procedure were discussed the patient and/or

their representative. Informed consent was obtained. A timeout procedure was

performed.



 All elements of maximal sterile barrier technique including the use of a cap,

mask, sterile gown, sterile gloves, large sterile sheet, appropriate hand

hygiene, and 2% chlorhexidine for cutaneous antisepsis (or acceptable

alternative antiseptic per current guidelines) were followed for this

procedure. The 



patient was prepped and draped in the usual sterile fashion.  Ultrasound

interrogation of the right neck revealed patency and compressibility of the

right internal jugular vein.  A 21-gauge micropuncture was then used to gain

access to this vein under ultrasound guidance.  A hard copy ultrasound image

was recorded. A guidewire was advanced centrally. 5 Chinese sheath was placed.

Over a wire following dilatation, a temp dialysis catheter  was advanced

centrally. Catheter was found to flush and aspirate normally. Follow-up chest

radiograph demonstrates tip at the cavoatrial junction. Catheter secured in

place and a sterile dressing was applied. No immediate complications were

identified.





Impression: Successful ultrasound-guided placement of right internal jugular

temporary dialysis catheter

## 2020-03-19 NOTE — NUR
SS following up with discharge planning. Pt is in ICU at this time. Per RN, pt is now on 
BIPAP. HCFS continuing to follow for self pay status.

## 2020-03-19 NOTE — PDOC
[employer] PROGRESS NOTE


Chief Complaint


Chief Complaint


Severe Acute pancreatitis


Acute kidney failure now requiring dialysis


Salpingo--itis


Gallstones (Calculus of gallbladder with acute cholecystitis without 

obstruction)


HTN 


Leukocytosis 


Hypoxia


Uterine fibroid


Hypoxia with respiratory failure


Intractable pain


Intractable nausea





History of Present Illness


History of Present Illness


[id number]


Patient seen and examined in the ICU


She is now on BiPAP appears more ill


Discussed with RN


Chart reviewed


She is now on dialysis








[id number]


Patient seen and examined in the ICU


She appears extremely ill critically ill


Discussed with RN


Getting a chest x-ray now


Her sats are only 87% on nasal cannula oxygen


Chart reviewed





Vitals/I&O


Vitals/I&O:





                                   Vital Signs








  Date Time  Temp Pulse Resp B/P (MAP) Pulse Ox O2 Delivery O2 Flow Rate FiO2


 


3/19/20 11:19     90 BiPAP/CPAP  


 


3/19/20 10:04  135  97/53    


 


3/19/20 06:03   31     


 


3/19/20 05:53       6.0 


 


3/19/20 04:00 98.8       





 98.8       














                                    I & O   


 


 3/18/20 3/18/20 3/19/20





 15:00 23:00 07:00


 


Intake Total 220 ml 1280 ml 2638 ml


 


Output Total 70 ml 10 ml 5 ml


 


Balance 150 ml 1270 ml 2633 ml











Physical Exam


Physical Exam:


GENERAL: pt on bipap, Lethargic, alert, awake, ill-appearing female in ICU bed, 

cooperative.


HEENT:  Normocephalic, atraumatic.  Mild icterus.  No thrush.  Oral mucosa dry.


NECK:  Supple. Temp HDC cath in place


LUNGS:  Decreased breath sounds at the bases.  No wheezing.


HEART:  S1, S2, tachycardia.  No murmurs.


ABDOMEN:  Soft, mildly distended.  Mild tenderness on deep palpation upper


abdomen.  No rebound, no guarding.


EXTREMITIES: + edema, no cyanosis.


DERMATOLOGIC:  Warm and dry.  No generalized rash.  PICC line in right upper


extremity looks okay.


CENTRAL NERVOUS SYSTEM:  Alert and oriented x 3, grossly nonfocal.


PSYCHIATRIC:  Cooperative.


General:  No acute distress


Heart:  Other (increased rate)


Lungs:  Clear


Abdomen:  Soft, Other (mildy TTP in the RUQ, epigastrium)


Extremities:  No edema, Other (SOME CLUBBING )


Skin:  No rashes, No breakdown, No significant lesion





Labs


Labs:





Laboratory Tests








Test


 3/18/20


11:38 3/18/20


17:20 3/18/20


17:26 3/19/20


00:23


 


Glucose (Fingerstick)


 174 mg/dL


(70-99) 


 99 mg/dL


(70-99) 





 


Ionized Calcium


 


 0.64 mmol/L


(1.13-1.32) 


 





 


O2 Saturation    95 % (92-99) 


 


Arterial Blood pH


 


 


 


 7.32


(7.35-7.45)


 


Arterial Blood pH (Temp


corrected) 


 


 


 7.30 





 


Arterial Blood pCO2 at


Patient Temp 


 


 


 40 mmHg


(35-46)


 


Arterial Blood pCO2 (Temp


correct) 


 


 


 42 mmHg 





 


Arterial Blood pO2 at Patient


Temp 


 


 


 79 mmHg


()


 


Arterial Blood pO2 (Temp


corrected) 


 


 


 85 mmHg 





 


Arterial Blood HCO3


 


 


 


 20 mmol/L


(21-28)


 


Arterial Blood Base Excess


 


 


 


 -6 mmol/L


(-3-3)


 


FiO2    60 


 


Test


 3/19/20


00:55 3/19/20


00:58 3/19/20


06:15 3/19/20


06:18


 


Sodium Level


 140 mmol/L


(136-145) 


 139 mmol/L


(136-145) 





 


Potassium Level


 3.8 mmol/L


(3.5-5.1) 


 3.8 mmol/L


(3.5-5.1) 





 


Chloride Level


 104 mmol/L


() 


 103 mmol/L


() 





 


Carbon Dioxide Level


 25 mmol/L


(21-32) 


 23 mmol/L


(21-32) 





 


Anion Gap 11 (6-14)   13 (6-14)  


 


Blood Urea Nitrogen


 38 mg/dL


(7-20) 


 52 mg/dL


(7-20) 





 


Creatinine


 5.2 mg/dL


(0.6-1.0) 


 5.7 mg/dL


(0.6-1.0) 





 


Estimated GFR


(Cockcroft-Gault) 8.8 


 


 7.9 


 





 


BUN/Creatinine Ratio 7 (6-20)   9 (6-20)  


 


Glucose Level


 223 mg/dL


(70-99) 


 236 mg/dL


(70-99) 





 


Calcium Level


 < 5.0 mg/dL


(8.5-10.1) 


 < 5.0 mg/dL


(8.5-10.1) 





 


Phosphorus Level


 4.4 mg/dL


(2.6-4.7) 


 4.5 mg/dL


(2.6-4.7) 





 


Magnesium Level


 1.3 mg/dL


(1.8-2.4) 


 1.4 mg/dL


(1.8-2.4) 





 


Total Bilirubin


 1.0 mg/dL


(0.2-1.0) 


 0.7 mg/dL


(0.2-1.0) 





 


Aspartate Amino Transf


(AST/SGOT) 127 U/L


(15-37) 


 111 U/L


(15-37) 





 


Alanine Aminotransferase


(ALT/SGPT) 103 U/L


(14-59) 


 84 U/L (14-59) 


 





 


Alkaline Phosphatase


 57 U/L


() 


 57 U/L


() 





 


Total Protein


 3.7 g/dL


(6.4-8.2) 


 4.3 g/dL


(6.4-8.2) 





 


Albumin


 1.8 g/dL


(3.4-5.0) 


 1.5 g/dL


(3.4-5.0) 





 


Albumin/Globulin Ratio 0.9 (1.0-1.7)   0.5 (1.0-1.7)  


 


Glucose (Fingerstick)


 


 204 mg/dL


(70-99) 


 201 mg/dL


(70-99)


 


White Blood Count


 


 


 18.1 x10^3/uL


(4.0-11.0) 





 


Red Blood Count


 


 


 3.53 x10^6/uL


(3.50-5.40) 





 


Hemoglobin


 


 


 11.7 g/dL


(12.0-15.5) 





 


Hematocrit


 


 


 34.8 %


(36.0-47.0) 





 


Mean Corpuscular Volume   99 fL ()  


 


Mean Corpuscular Hemoglobin   33 pg (25-35)  


 


Mean Corpuscular Hemoglobin


Concent 


 


 34 g/dL


(31-37) 





 


Red Cell Distribution Width


 


 


 13.7 %


(11.5-14.5) 





 


Platelet Count


 


 


 162 x10^3/uL


(140-400) 





 


Neutrophils (%) (Auto)   88 % (31-73)  


 


Lymphocytes (%) (Auto)   7 % (24-48)  


 


Monocytes (%) (Auto)   5 % (0-9)  


 


Eosinophils (%) (Auto)   0 % (0-3)  


 


Basophils (%) (Auto)   0 % (0-3)  


 


Neutrophils # (Auto)


 


 


 16.0 x10^3/uL


(1.8-7.7) 





 


Lymphocytes # (Auto)


 


 


 1.2 x10^3/uL


(1.0-4.8) 





 


Monocytes # (Auto)


 


 


 0.8 x10^3/uL


(0.0-1.1) 





 


Eosinophils # (Auto)


 


 


 0.0 x10^3/uL


(0.0-0.7) 





 


Basophils # (Auto)


 


 


 0.0 x10^3/uL


(0.0-0.2) 





 


Test


 3/19/20


11:25 3/19/20


11:26 


 





 


O2 Saturation 96 % (92-99)    


 


Arterial Blood pH


 7.40


(7.35-7.45) 


 


 





 


Arterial Blood pCO2 at


Patient Temp 40 mmHg


(35-46) 


 


 





 


Arterial Blood pO2 at Patient


Temp 84 mmHg


() 


 


 





 


Arterial Blood HCO3


 25 mmol/L


(21-28) 


 


 





 


Arterial Blood Base Excess


 0 mmol/L


(-3-3) 


 


 





 


FiO2 45    


 


Glucose (Fingerstick)


 


 176 mg/dL


(70-99) 


 














Assessment and Plan


Assessmemt and Plan


Problems


Medical Problems:


(1) Acute pancreatitis


Status: Acute  





(2) Cholelithiasis


Status: Acute  





Severe Acute pancreatitis


Acute kidney failure now requiring dialysis


Salpingo--itis


Gallstones (Calculus of gallbladder with acute cholecystitis without 

obstruction)


HTN 


Leukocytosis 


Hypoxia


Uterine fibroid


Hypoxia with respiratory failure


Intractable pain


Intractable nausea





Plan


Hemodialysis


BiPAP


ICU monitoring


Trend labs especially white count and lipase levels


We have consulted GI and general surgery


I consulted OB/GYN as well


IV antibiotics


IV fluids


HI narcotics


When necessary anti-medics


Home meds if possible


DVT prophylaxis


Full code


She is critically ill


Total time 32 minutes





Comment


Review of Relevant


I have reviewed the following items josy (where applicable) has been applied.


Medications:





Current Medications








 Medications


  (Trade)  Dose


 Ordered  Sig/Yee


 Route


 PRN Reason  Start Time


 Stop Time Status Last Admin


Dose Admin


 


 Meropenem 500 mg/


 Sodium Chloride  50 ml @ 


 100 mls/hr  Q12HR


 IV


   3/18/20 18:00


    3/19/20 09:27





 


 Info


  (Tpn Per


 Pharmacy)  1 each  PRN DAILY  PRN


 [facility]


 SEE COMMENTS  3/18/20 12:00


    3/18/20 12:43





 


 Sodium Chloride


 90 meq/Calcium


 Gluconate 10 meq/


 Multivitamins 10


 ml/Chromium/


 Copper/Manganese/


 Seleni/Zn 0.5 ml/


 Total Parenteral


 Nutrition/Amino


 Acids/Dextrose/


 Fat Emulsion


 Intravenous  1,512 ml @ 


 63 mls/hr  TPN  CONT


 IV


   3/18/20 22:00


 3/19/20 21:59  3/18/20 22:06





 


 Calcium Gluconate


  (Calcium


 Gluconate)  2,000 mg  1X  ONCE


 IVP


   3/19/20 02:15


 3/19/20 02:16 DC 3/19/20 02:19





 


 Calcium Chloride


 3000 mg/Sodium


 Chloride  1,030 ml @ 


 50 mls/hr  [id number]


 IV


   3/19/20 08:00


    3/19/20 08:26





 


 Lorazepam


  (Ativan Inj)  1 mg  PRN Q4HRS  PRN


 IVP


 ANXIETY / AGITATION  3/19/20 09:00


    3/19/20 09:05





 


 Digoxin


  (Lanoxin)  500 mcg  1X  ONCE


 IV


   3/19/20 10:00


 3/19/20 10:01 DC 3/19/20 10:04














Hemodynamically unstable?:  No


Is patient in severe pain?:  Yes


Is NPO status required?:  Yes











HECTOR MASON III DO           Mar 19, 2020 11:35

## 2020-03-19 NOTE — PDOC
G I PROGRESS NOTE


Subjective


Received sedation before I saw.  Did not respond verbally or to palpation.


Objective


Others' notes reviewed.


Physical Exam


Lungs clear anteriorly.


RRR


Abdomen soft, diminished bowel sounds.


Review of Relevant


I have reviewed the following items josy (where applicable) has been applied.


Labs





Laboratory Tests








Test


 3/17/20


11:48 3/17/20


16:50 3/17/20


18:02 3/17/20


20:30


 


Glucose (Fingerstick)


 231 mg/dL


(70-99) 


 232 mg/dL


(70-99) 





 


White Blood Count


 


 22.7 x10^3/uL


(4.0-11.0) 


 





 


Red Blood Count


 


 4.12 x10^6/uL


(3.50-5.40) 


 





 


Hemoglobin


 


 13.8 g/dL


(12.0-15.5) 


 





 


Hematocrit


 


 42.0 %


(36.0-47.0) 


 





 


Mean Corpuscular Volume


 


 102 fL


() 


 





 


Mean Corpuscular Hemoglobin  34 pg (25-35)   


 


Mean Corpuscular Hemoglobin


Concent 


 33 g/dL


(31-37) 


 





 


Red Cell Distribution Width


 


 13.8 %


(11.5-14.5) 


 





 


Platelet Count


 


 212 x10^3/uL


(140-400) 


 





 


Sodium Level


 


 


 


 139 mmol/L


(136-145)


 


Potassium Level


 


 


 


 6.8 mmol/L


(3.5-5.1)


 


Chloride Level


 


 


 


 109 mmol/L


()


 


Carbon Dioxide Level


 


 


 


 17 mmol/L


(21-32)


 


Anion Gap    13 (6-14) 


 


Blood Urea Nitrogen


 


 


 


 56 mg/dL


(7-20)


 


Creatinine


 


 


 


 5.0 mg/dL


(0.6-1.0)


 


Estimated GFR


(Cockcroft-Gault) 


 


 


 9.2 





 


BUN/Creatinine Ratio    11 (6-20) 


 


Glucose Level


 


 


 


 196 mg/dL


(70-99)


 


Calcium Level


 


 


 


 < 5.0 mg/dL


(8.5-10.1)


 


Phosphorus Level


 


 


 


 1.2 mg/dL


(2.6-4.7)


 


Magnesium Level


 


 


 


 1.2 mg/dL


(1.8-2.4)


 


Total Bilirubin


 


 


 


 1.9 mg/dL


(0.2-1.0)


 


Aspartate Amino Transf


(AST/SGOT) 


 


 


 214 U/L


(15-37)


 


Alanine Aminotransferase


(ALT/SGPT) 


 


 


 263 U/L


(14-59)


 


Alkaline Phosphatase


 


 


 


 75 U/L


()


 


Total Protein


 


 


 


 5.6 g/dL


(6.4-8.2)


 


Albumin


 


 


 


 2.8 g/dL


(3.4-5.0)


 


Albumin/Globulin Ratio    1.0 (1.0-1.7) 


 


Test


 3/18/20


00:15 3/18/20


00:17 3/18/20


05:45 3/18/20


05:53


 


White Blood Count


 22.9 x10^3/uL


(4.0-11.0) 


 22.6 x10^3/uL


(4.0-11.0) 





 


Hemoglobin


 15.1 g/dL


(12.0-15.5) 


 14.2 g/dL


(12.0-15.5) 





 


Hematocrit


 45.5 %


(36.0-47.0) 


 43.0 %


(36.0-47.0) 





 


Platelet Count


 232 x10^3/uL


(140-400) 


 207 x10^3/uL


(140-400) 





 


Prothrombin Time


 16.8 SEC


(11.7-14.0) 


 


 





 


Prothromb Time International


Ratio 1.4 (0.8-1.1) 


 


 


 





 


Sodium Level


 141 mmol/L


(136-145) 


 143 mmol/L


(136-145) 





 


Potassium Level


 5.7 mmol/L


(3.5-5.1) 


 5.9 mmol/L


(3.5-5.1) 





 


Chloride Level


 112 mmol/L


() 


 114 mmol/L


() 





 


Carbon Dioxide Level


 17 mmol/L


(21-32) 


 14 mmol/L


(21-32) 





 


Anion Gap 12 (6-14)   15 (6-14)  


 


Blood Urea Nitrogen


 63 mg/dL


(7-20) 


 64 mg/dL


(7-20) 





 


Creatinine


 5.5 mg/dL


(0.6-1.0) 


 5.9 mg/dL


(0.6-1.0) 





 


Estimated GFR


(Cockcroft-Gault) 8.2 


 


 7.6 


 





 


Glucose Level


 129 mg/dL


(70-99) 


 167 mg/dL


(70-99) 





 


Lactic Acid Level


 1.6 mmol/L


(0.4-2.0) 


 


 





 


Calcium Level


 5.3 mg/dL


(8.5-10.1) 


 < 5.0 mg/dL


(8.5-10.1) 





 


Phosphorus Level


 1.7 mg/dL


(2.6-4.7) 


 1.9 mg/dL


(2.6-4.7) 





 


Magnesium Level


 1.3 mg/dL


(1.8-2.4) 


 2.1 mg/dL


(1.8-2.4) 





 


Glucose (Fingerstick)


 


 127 mg/dL


(70-99) 


 174 mg/dL


(70-99)


 


Red Blood Count


 


 


 4.24 x10^6/uL


(3.50-5.40) 





 


Mean Corpuscular Volume


 


 


 101 fL


() 





 


Mean Corpuscular Hemoglobin   34 pg (25-35)  


 


Mean Corpuscular Hemoglobin


Concent 


 


 33 g/dL


(31-37) 





 


Red Cell Distribution Width


 


 


 14.3 %


(11.5-14.5) 





 


Neutrophils (%) (Auto)   88 % (31-73)  


 


Lymphocytes (%) (Auto)   6 % (24-48)  


 


Monocytes (%) (Auto)   6 % (0-9)  


 


Eosinophils (%) (Auto)   0 % (0-3)  


 


Basophils (%) (Auto)   0 % (0-3)  


 


Neutrophils # (Auto)


 


 


 19.8 x10^3/uL


(1.8-7.7) 





 


Lymphocytes # (Auto)


 


 


 1.3 x10^3/uL


(1.0-4.8) 





 


Monocytes # (Auto)


 


 


 1.4 x10^3/uL


(0.0-1.1) 





 


Eosinophils # (Auto)


 


 


 0.0 x10^3/uL


(0.0-0.7) 





 


Basophils # (Auto)


 


 


 0.0 x10^3/uL


(0.0-0.2) 





 


BUN/Creatinine Ratio   11 (6-20)  


 


Total Bilirubin


 


 


 1.3 mg/dL


(0.2-1.0) 





 


Aspartate Amino Transf


(AST/SGOT) 


 


 164 U/L


(15-37) 





 


Alanine Aminotransferase


(ALT/SGPT) 


 


 169 U/L


(14-59) 





 


Alkaline Phosphatase


 


 


 60 U/L


() 





 


Total Protein


 


 


 4.8 g/dL


(6.4-8.2) 





 


Albumin


 


 


 2.1 g/dL


(3.4-5.0) 





 


Albumin/Globulin Ratio   0.8 (1.0-1.7)  


 


Lipase


 


 


 3832 U/L


() 





 


Hepatitis B Surface Antigen


 


 


 Nonreactive


(Nonreactive) 





 


Hepatitis B Core Total


Antibody 


 


 Nonreactive


(Nonreactive) 





 


Test


 3/18/20


09:05 3/18/20


11:38 3/18/20


17:20 3/18/20


17:26


 


O2 Saturation 95 % (92-99)    


 


Arterial Blood pH


 7.23


(7.35-7.45) 


 


 





 


Arterial Blood pCO2 at


Patient Temp 26 mmHg


(35-46) 


 


 





 


Arterial Blood pO2 at Patient


Temp 77 mmHg


() 


 


 





 


Arterial Blood HCO3


 11 mmol/L


(21-28) 


 


 





 


Arterial Blood Base Excess


 -15 mmol/L


(-3-3) 


 


 





 


FiO2 35    


 


Glucose (Fingerstick)


 


 174 mg/dL


(70-99) 


 99 mg/dL


(70-99)


 


Ionized Calcium


 


 


 0.64 mmol/L


(1.13-1.32) 





 


Test


 3/19/20


00:23 3/19/20


00:55 3/19/20


00:58 3/19/20


06:15


 


O2 Saturation 95 % (92-99)    


 


Arterial Blood pH


 7.32


(7.35-7.45) 


 


 





 


Arterial Blood pH (Temp


corrected) 7.30 


 


 


 





 


Arterial Blood pCO2 at


Patient Temp 40 mmHg


(35-46) 


 


 





 


Arterial Blood pCO2 (Temp


correct) 42 mmHg 


 


 


 





 


Arterial Blood pO2 at Patient


Temp 79 mmHg


() 


 


 





 


Arterial Blood pO2 (Temp


corrected) 85 mmHg 


 


 


 





 


Arterial Blood HCO3


 20 mmol/L


(21-28) 


 


 





 


Arterial Blood Base Excess


 -6 mmol/L


(-3-3) 


 


 





 


FiO2 60    


 


Sodium Level


 


 140 mmol/L


(136-145) 


 139 mmol/L


(136-145)


 


Potassium Level


 


 3.8 mmol/L


(3.5-5.1) 


 3.8 mmol/L


(3.5-5.1)


 


Chloride Level


 


 104 mmol/L


() 


 103 mmol/L


()


 


Carbon Dioxide Level


 


 25 mmol/L


(21-32) 


 23 mmol/L


(21-32)


 


Anion Gap  11 (6-14)   13 (6-14) 


 


Blood Urea Nitrogen


 


 38 mg/dL


(7-20) 


 52 mg/dL


(7-20)


 


Creatinine


 


 5.2 mg/dL


(0.6-1.0) 


 5.7 mg/dL


(0.6-1.0)


 


Estimated GFR


(Cockcroft-Gault) 


 8.8 


 


 7.9 





 


BUN/Creatinine Ratio  7 (6-20)   9 (6-20) 


 


Glucose Level


 


 223 mg/dL


(70-99) 


 236 mg/dL


(70-99)


 


Calcium Level


 


 < 5.0 mg/dL


(8.5-10.1) 


 < 5.0 mg/dL


(8.5-10.1)


 


Phosphorus Level


 


 4.4 mg/dL


(2.6-4.7) 


 4.5 mg/dL


(2.6-4.7)


 


Magnesium Level


 


 1.3 mg/dL


(1.8-2.4) 


 1.4 mg/dL


(1.8-2.4)


 


Total Bilirubin


 


 1.0 mg/dL


(0.2-1.0) 


 0.7 mg/dL


(0.2-1.0)


 


Aspartate Amino Transf


(AST/SGOT) 


 127 U/L


(15-37) 


 111 U/L


(15-37)


 


Alanine Aminotransferase


(ALT/SGPT) 


 103 U/L


(14-59) 


 84 U/L (14-59) 





 


Alkaline Phosphatase


 


 57 U/L


() 


 57 U/L


()


 


Total Protein


 


 3.7 g/dL


(6.4-8.2) 


 4.3 g/dL


(6.4-8.2)


 


Albumin


 


 1.8 g/dL


(3.4-5.0) 


 1.5 g/dL


(3.4-5.0)


 


Albumin/Globulin Ratio  0.9 (1.0-1.7)   0.5 (1.0-1.7) 


 


Glucose (Fingerstick)


 


 


 204 mg/dL


(70-99) 





 


White Blood Count


 


 


 


 18.1 x10^3/uL


(4.0-11.0)


 


Red Blood Count


 


 


 


 3.53 x10^6/uL


(3.50-5.40)


 


Hemoglobin


 


 


 


 11.7 g/dL


(12.0-15.5)


 


Hematocrit


 


 


 


 34.8 %


(36.0-47.0)


 


Mean Corpuscular Volume    99 fL () 


 


Mean Corpuscular Hemoglobin    33 pg (25-35) 


 


Mean Corpuscular Hemoglobin


Concent 


 


 


 34 g/dL


(31-37)


 


Red Cell Distribution Width


 


 


 


 13.7 %


(11.5-14.5)


 


Platelet Count


 


 


 


 162 x10^3/uL


(140-400)


 


Neutrophils (%) (Auto)    88 % (31-73) 


 


Lymphocytes (%) (Auto)    7 % (24-48) 


 


Monocytes (%) (Auto)    5 % (0-9) 


 


Eosinophils (%) (Auto)    0 % (0-3) 


 


Basophils (%) (Auto)    0 % (0-3) 


 


Neutrophils # (Auto)


 


 


 


 16.0 x10^3/uL


(1.8-7.7)


 


Lymphocytes # (Auto)


 


 


 


 1.2 x10^3/uL


(1.0-4.8)


 


Monocytes # (Auto)


 


 


 


 0.8 x10^3/uL


(0.0-1.1)


 


Eosinophils # (Auto)


 


 


 


 0.0 x10^3/uL


(0.0-0.7)


 


Basophils # (Auto)


 


 


 


 0.0 x10^3/uL


(0.0-0.2)


 


Test


 3/19/20


06:18 


 


 





 


Glucose (Fingerstick)


 201 mg/dL


(70-99) 


 


 











Laboratory Tests








Test


 3/18/20


11:38 3/18/20


17:20 3/18/20


17:26 3/19/20


00:23


 


Glucose (Fingerstick)


 174 mg/dL


(70-99) 


 99 mg/dL


(70-99) 





 


Ionized Calcium


 


 0.64 mmol/L


(1.13-1.32) 


 





 


O2 Saturation    95 % (92-99) 


 


Arterial Blood pH


 


 


 


 7.32


(7.35-7.45)


 


Arterial Blood pH (Temp


corrected) 


 


 


 7.30 





 


Arterial Blood pCO2 at


Patient Temp 


 


 


 40 mmHg


(35-46)


 


Arterial Blood pCO2 (Temp


correct) 


 


 


 42 mmHg 





 


Arterial Blood pO2 at Patient


Temp 


 


 


 79 mmHg


()


 


Arterial Blood pO2 (Temp


corrected) 


 


 


 85 mmHg 





 


Arterial Blood HCO3


 


 


 


 20 mmol/L


(21-28)


 


Arterial Blood Base Excess


 


 


 


 -6 mmol/L


(-3-3)


 


FiO2    60 


 


Test


 3/19/20


00:55 3/19/20


00:58 3/19/20


06:15 3/19/20


06:18


 


Sodium Level


 140 mmol/L


(136-145) 


 139 mmol/L


(136-145) 





 


Potassium Level


 3.8 mmol/L


(3.5-5.1) 


 3.8 mmol/L


(3.5-5.1) 





 


Chloride Level


 104 mmol/L


() 


 103 mmol/L


() 





 


Carbon Dioxide Level


 25 mmol/L


(21-32) 


 23 mmol/L


(21-32) 





 


Anion Gap 11 (6-14)   13 (6-14)  


 


Blood Urea Nitrogen


 38 mg/dL


(7-20) 


 52 mg/dL


(7-20) 





 


Creatinine


 5.2 mg/dL


(0.6-1.0) 


 5.7 mg/dL


(0.6-1.0) 





 


Estimated GFR


(Cockcroft-Gault) 8.8 


 


 7.9 


 





 


BUN/Creatinine Ratio 7 (6-20)   9 (6-20)  


 


Glucose Level


 223 mg/dL


(70-99) 


 236 mg/dL


(70-99) 





 


Calcium Level


 < 5.0 mg/dL


(8.5-10.1) 


 < 5.0 mg/dL


(8.5-10.1) 





 


Phosphorus Level


 4.4 mg/dL


(2.6-4.7) 


 4.5 mg/dL


(2.6-4.7) 





 


Magnesium Level


 1.3 mg/dL


(1.8-2.4) 


 1.4 mg/dL


(1.8-2.4) 





 


Total Bilirubin


 1.0 mg/dL


(0.2-1.0) 


 0.7 mg/dL


(0.2-1.0) 





 


Aspartate Amino Transf


(AST/SGOT) 127 U/L


(15-37) 


 111 U/L


(15-37) 





 


Alanine Aminotransferase


(ALT/SGPT) 103 U/L


(14-59) 


 84 U/L (14-59) 


 





 


Alkaline Phosphatase


 57 U/L


() 


 57 U/L


() 





 


Total Protein


 3.7 g/dL


(6.4-8.2) 


 4.3 g/dL


(6.4-8.2) 





 


Albumin


 1.8 g/dL


(3.4-5.0) 


 1.5 g/dL


(3.4-5.0) 





 


Albumin/Globulin Ratio 0.9 (1.0-1.7)   0.5 (1.0-1.7)  


 


Glucose (Fingerstick)


 


 204 mg/dL


(70-99) 


 201 mg/dL


(70-99)


 


White Blood Count


 


 


 18.1 x10^3/uL


(4.0-11.0) 





 


Red Blood Count


 


 


 3.53 x10^6/uL


(3.50-5.40) 





 


Hemoglobin


 


 


 11.7 g/dL


(12.0-15.5) 





 


Hematocrit


 


 


 34.8 %


(36.0-47.0) 





 


Mean Corpuscular Volume   99 fL ()  


 


Mean Corpuscular Hemoglobin   33 pg (25-35)  


 


Mean Corpuscular Hemoglobin


Concent 


 


 34 g/dL


(31-37) 





 


Red Cell Distribution Width


 


 


 13.7 %


(11.5-14.5) 





 


Platelet Count


 


 


 162 x10^3/uL


(140-400) 





 


Neutrophils (%) (Auto)   88 % (31-73)  


 


Lymphocytes (%) (Auto)   7 % (24-48)  


 


Monocytes (%) (Auto)   5 % (0-9)  


 


Eosinophils (%) (Auto)   0 % (0-3)  


 


Basophils (%) (Auto)   0 % (0-3)  


 


Neutrophils # (Auto)


 


 


 16.0 x10^3/uL


(1.8-7.7) 





 


Lymphocytes # (Auto)


 


 


 1.2 x10^3/uL


(1.0-4.8) 





 


Monocytes # (Auto)


 


 


 0.8 x10^3/uL


(0.0-1.1) 





 


Eosinophils # (Auto)


 


 


 0.0 x10^3/uL


(0.0-0.7) 





 


Basophils # (Auto)


 


 


 0.0 x10^3/uL


(0.0-0.2) 











Microbiology


3/18/20 Blood Culture - Preliminary, Resulted


          NO GROWTH AFTER 1 DAY








Labs stable.


Vitals/I & O





Vital Sign - Last 24 Hours








 3/18/20 3/18/20 3/18/20 3/18/20





 10:24 11:18 11:33 11:43


 


Temp  101.3  





  101.3  


 


Pulse 130 133  133


 


Resp 36 36 33 35


 


B/P (MAP) 116/66 (83) 139/72 (94)  131/70 (90)


 


Pulse Ox 96 98 98 98


 


O2 Delivery Nasal Cannula Nasal Cannula Nasal Cannula Nasal Cannula


 


O2 Flow Rate 3.0 3.0 3.0 3.0


 


    





    





 3/18/20 3/18/20 3/18/20 3/18/20





 11:44 12:00 12:03 12:45


 


Pulse  133  


 


Resp   21 


 


B/P (MAP) 128/65 (86) 96/50  


 


O2 Delivery   Nasal Cannula Room Air


 


O2 Flow Rate   3.0 





 3/18/20 3/18/20 3/18/20 3/18/20





 12:48 13:40 14:07 15:23


 


Temp   99.7 





   99.7 


 


Pulse  128 128 126


 


Resp  21 30 32


 


B/P (MAP) 95/58 (70) 96/63 (74) 110/65 (80) 109/79 (89)


 


Pulse Ox  97 91 95


 


O2 Delivery  Nasal Cannula Nasal Cannula Nasal Cannula


 


O2 Flow Rate  3.0 3.0 3.0


 


    





    





 3/18/20 3/18/20 3/18/20 3/18/20





 15:39 15:40 16:03 16:03


 


Pulse 124   


 


Resp 20   


 


B/P (MAP) 122/68 (86) 122/64 (83)  135/68 (90)


 


Pulse Ox 95   


 


O2 Delivery   Room Air 


 


O2 Flow Rate 3.0   





 3/18/20 3/18/20 3/18/20 3/18/20





 16:50 17:15 17:45 18:00


 


Pulse    126


 


Resp  30 22 


 


B/P (MAP) 123/69 (87)   100/57


 


O2 Delivery   Nasal Cannula 


 


O2 Flow Rate   3.0 





 3/18/20 3/18/20 3/18/20 3/18/20





 18:46 19:00 20:00 20:00


 


Pulse 131 131  


 


Resp 24 24  


 


B/P (MAP) 118/57 (77) 112/58 (76)  90/59 (69)


 


Pulse Ox 97 97  


 


O2 Delivery Nasal Cannula Nasal Cannula Nasal Cannula 


 


O2 Flow Rate 3.0 3.0 3.0 





 3/18/20 3/18/20 3/18/20 3/18/20





 20:00 20:57 21:00 21:27


 


Temp 98.1   





 98.1   


 


Pulse 131  130 


 


Resp 24 24 24 24


 


B/P (MAP) 118/63 (81)  116/62 (80) 


 


Pulse Ox 97 97 93 96


 


O2 Delivery Nasal Cannula Room Air Nasal Cannula Nasal Cannula


 


O2 Flow Rate 3.0 3.0 3.0 6.0


 


    





    





 3/18/20 3/18/20 3/18/20 3/19/20





 22:00 23:00 23:56 00:00


 


Temp    100.8





    100.8


 


Pulse 132 131  134


 


Resp 26 24 26 25


 


B/P (MAP) 114/62 (79) 119/63 (81)  113/56 (75)


 


Pulse Ox 90 96 96 91


 


O2 Delivery Nasal Cannula Nasal Cannula Nasal Cannula Nasal Cannula


 


O2 Flow Rate 3.0 6.0 10.0 8.0


 


    





    





 3/19/20 3/19/20 3/19/20 3/19/20





 00:00 00:00 01:00 02:00


 


Pulse   140 119


 


Resp   26 22


 


B/P (MAP) 116/81 (93)  106/54 (71) 103/54 (70)


 


Pulse Ox   95 95


 


O2 Delivery  Nasal Cannula Nasal Cannula Nasal Cannula


 


O2 Flow Rate  6.0 6.0 6.0





 3/19/20 3/19/20 3/19/20 3/19/20





 02:18 02:41 02:44 03:00


 


Pulse 131   119


 


Resp  24 22 22


 


B/P (MAP) 119/63   101/54 (70)


 


Pulse Ox  96 96 96


 


O2 Delivery  Nasal Cannula Nasal Cannula Nasal Cannula


 


O2 Flow Rate  10.0 10.0 6.0





 3/19/20 3/19/20 3/19/20 3/19/20





 04:00 04:00 04:00 04:29


 


Temp 98.8   





 98.8   


 


Pulse 121   


 


Resp 20   


 


B/P (MAP) 96/51 (66)  90/59 (69) 


 


Pulse Ox 97   95


 


O2 Delivery BiPAP/CPAP Bi-pap  BiPAP/CPAP


 


    





    





 3/19/20 3/19/20 3/19/20 3/19/20





 05:00 05:53 06:00 06:03


 


Pulse 124  125 


 


Resp 20 22 22 31


 


B/P (MAP) 101/55 (70)  97/53 (68) 


 


Pulse Ox 98 98 98 98


 


O2 Delivery BiPAP/CPAP BiPAP/CPAP BiPAP/CPAP BiPAP/CPAP


 


O2 Flow Rate  6.0  





 3/19/20 3/19/20  





 08:46 10:04  


 


Pulse  135  


 


B/P (MAP)  97/53  


 


Pulse Ox 99   


 


O2 Delivery BiPAP/CPAP   














Intake and Output   


 


 3/18/20 3/18/20 3/19/20





 15:00 23:00 07:00


 


Intake Total 220 ml 1280 ml 2638 ml


 


Output Total 70 ml 10 ml 5 ml


 


Balance 150 ml 1270 ml 2633 ml








Still not much urine output.


Problem List


Problems


Medical Problems:


(1) Acute pancreatitis


Status: Acute  





(2) Cholelithiasis


Status: Acute  





Assessment


Biliary pancreatitis, necrotizing/severe, with renal failure, hypocalcemia, 

pulmonary compromise.


Plan of Care Note


Continue support.





Hemodynamically unstable?:  No


Is patient in severe pain?:  Yes


Is NPO status required?:  Yes











MARCO ANTONIO LONGO MD          Mar 19, 2020 10:14

## 2020-03-19 NOTE — PDOC
PULMONARY PROGRESS NOTES


Subjective


PT ON BIPAP NOW UNDER GOING HD


SLEEPY BUT FOLLOW COMMANDS


HR ELEVATED SEEMS SINUS


Vitals





Vital Signs








  Date Time  Temp Pulse Resp B/P (MAP) Pulse Ox O2 Delivery O2 Flow Rate FiO2


 


3/19/20 08:46     99 BiPAP/CPAP  


 


3/19/20 06:03   31     


 


3/19/20 06:00  125  97/53 (68)    


 


3/19/20 05:53       6.0 


 


3/19/20 04:00 98.8       





 98.8       








ROS:  No Chest Pain


General:  Alert


Lungs:  Clear


Cardiovascular:  S1, S2


Abdomen:  Other (DISTENDED AND DIFFUSE PAIN)


Neuro Exam:  Alert


Extremities:  No Edema


Skin:  Warm


Labs





Laboratory Tests








Test


 3/17/20


11:48 3/17/20


16:50 3/17/20


18:02 3/17/20


20:30


 


Glucose (Fingerstick)


 231 mg/dL


(70-99) 


 232 mg/dL


(70-99) 





 


White Blood Count


 


 22.7 x10^3/uL


(4.0-11.0) 


 





 


Red Blood Count


 


 4.12 x10^6/uL


(3.50-5.40) 


 





 


Hemoglobin


 


 13.8 g/dL


(12.0-15.5) 


 





 


Hematocrit


 


 42.0 %


(36.0-47.0) 


 





 


Mean Corpuscular Volume


 


 102 fL


() 


 





 


Mean Corpuscular Hemoglobin  34 pg (25-35)   


 


Mean Corpuscular Hemoglobin


Concent 


 33 g/dL


(31-37) 


 





 


Red Cell Distribution Width


 


 13.8 %


(11.5-14.5) 


 





 


Platelet Count


 


 212 x10^3/uL


(140-400) 


 





 


Sodium Level


 


 


 


 139 mmol/L


(136-145)


 


Potassium Level


 


 


 


 6.8 mmol/L


(3.5-5.1)


 


Chloride Level


 


 


 


 109 mmol/L


()


 


Carbon Dioxide Level


 


 


 


 17 mmol/L


(21-32)


 


Anion Gap    13 (6-14) 


 


Blood Urea Nitrogen


 


 


 


 56 mg/dL


(7-20)


 


Creatinine


 


 


 


 5.0 mg/dL


(0.6-1.0)


 


Estimated GFR


(Cockcroft-Gault) 


 


 


 9.2 





 


BUN/Creatinine Ratio    11 (6-20) 


 


Glucose Level


 


 


 


 196 mg/dL


(70-99)


 


Calcium Level


 


 


 


 < 5.0 mg/dL


(8.5-10.1)


 


Phosphorus Level


 


 


 


 1.2 mg/dL


(2.6-4.7)


 


Magnesium Level


 


 


 


 1.2 mg/dL


(1.8-2.4)


 


Total Bilirubin


 


 


 


 1.9 mg/dL


(0.2-1.0)


 


Aspartate Amino Transf


(AST/SGOT) 


 


 


 214 U/L


(15-37)


 


Alanine Aminotransferase


(ALT/SGPT) 


 


 


 263 U/L


(14-59)


 


Alkaline Phosphatase


 


 


 


 75 U/L


()


 


Total Protein


 


 


 


 5.6 g/dL


(6.4-8.2)


 


Albumin


 


 


 


 2.8 g/dL


(3.4-5.0)


 


Albumin/Globulin Ratio    1.0 (1.0-1.7) 


 


Test


 3/18/20


00:15 3/18/20


00:17 3/18/20


05:45 3/18/20


05:53


 


White Blood Count


 22.9 x10^3/uL


(4.0-11.0) 


 22.6 x10^3/uL


(4.0-11.0) 





 


Hemoglobin


 15.1 g/dL


(12.0-15.5) 


 14.2 g/dL


(12.0-15.5) 





 


Hematocrit


 45.5 %


(36.0-47.0) 


 43.0 %


(36.0-47.0) 





 


Platelet Count


 232 x10^3/uL


(140-400) 


 207 x10^3/uL


(140-400) 





 


Prothrombin Time


 16.8 SEC


(11.7-14.0) 


 


 





 


Prothromb Time International


Ratio 1.4 (0.8-1.1) 


 


 


 





 


Sodium Level


 141 mmol/L


(136-145) 


 143 mmol/L


(136-145) 





 


Potassium Level


 5.7 mmol/L


(3.5-5.1) 


 5.9 mmol/L


(3.5-5.1) 





 


Chloride Level


 112 mmol/L


() 


 114 mmol/L


() 





 


Carbon Dioxide Level


 17 mmol/L


(21-32) 


 14 mmol/L


(21-32) 





 


Anion Gap 12 (6-14)   15 (6-14)  


 


Blood Urea Nitrogen


 63 mg/dL


(7-20) 


 64 mg/dL


(7-20) 





 


Creatinine


 5.5 mg/dL


(0.6-1.0) 


 5.9 mg/dL


(0.6-1.0) 





 


Estimated GFR


(Cockcroft-Gault) 8.2 


 


 7.6 


 





 


Glucose Level


 129 mg/dL


(70-99) 


 167 mg/dL


(70-99) 





 


Lactic Acid Level


 1.6 mmol/L


(0.4-2.0) 


 


 





 


Calcium Level


 5.3 mg/dL


(8.5-10.1) 


 < 5.0 mg/dL


(8.5-10.1) 





 


Phosphorus Level


 1.7 mg/dL


(2.6-4.7) 


 1.9 mg/dL


(2.6-4.7) 





 


Magnesium Level


 1.3 mg/dL


(1.8-2.4) 


 2.1 mg/dL


(1.8-2.4) 





 


Glucose (Fingerstick)


 


 127 mg/dL


(70-99) 


 174 mg/dL


(70-99)


 


Red Blood Count


 


 


 4.24 x10^6/uL


(3.50-5.40) 





 


Mean Corpuscular Volume


 


 


 101 fL


() 





 


Mean Corpuscular Hemoglobin   34 pg (25-35)  


 


Mean Corpuscular Hemoglobin


Concent 


 


 33 g/dL


(31-37) 





 


Red Cell Distribution Width


 


 


 14.3 %


(11.5-14.5) 





 


Neutrophils (%) (Auto)   88 % (31-73)  


 


Lymphocytes (%) (Auto)   6 % (24-48)  


 


Monocytes (%) (Auto)   6 % (0-9)  


 


Eosinophils (%) (Auto)   0 % (0-3)  


 


Basophils (%) (Auto)   0 % (0-3)  


 


Neutrophils # (Auto)


 


 


 19.8 x10^3/uL


(1.8-7.7) 





 


Lymphocytes # (Auto)


 


 


 1.3 x10^3/uL


(1.0-4.8) 





 


Monocytes # (Auto)


 


 


 1.4 x10^3/uL


(0.0-1.1) 





 


Eosinophils # (Auto)


 


 


 0.0 x10^3/uL


(0.0-0.7) 





 


Basophils # (Auto)


 


 


 0.0 x10^3/uL


(0.0-0.2) 





 


BUN/Creatinine Ratio   11 (6-20)  


 


Total Bilirubin


 


 


 1.3 mg/dL


(0.2-1.0) 





 


Aspartate Amino Transf


(AST/SGOT) 


 


 164 U/L


(15-37) 





 


Alanine Aminotransferase


(ALT/SGPT) 


 


 169 U/L


(14-59) 





 


Alkaline Phosphatase


 


 


 60 U/L


() 





 


Total Protein


 


 


 4.8 g/dL


(6.4-8.2) 





 


Albumin


 


 


 2.1 g/dL


(3.4-5.0) 





 


Albumin/Globulin Ratio   0.8 (1.0-1.7)  


 


Lipase


 


 


 3832 U/L


() 





 


Hepatitis B Surface Antigen


 


 


 Nonreactive


(Nonreactive) 





 


Hepatitis B Core Total


Antibody 


 


 Nonreactive


(Nonreactive) 





 


Test


 3/18/20


09:05 3/18/20


11:38 3/18/20


17:20 3/18/20


17:26


 


O2 Saturation 95 % (92-99)    


 


Arterial Blood pH


 7.23


(7.35-7.45) 


 


 





 


Arterial Blood pCO2 at


Patient Temp 26 mmHg


(35-46) 


 


 





 


Arterial Blood pO2 at Patient


Temp 77 mmHg


() 


 


 





 


Arterial Blood HCO3


 11 mmol/L


(21-28) 


 


 





 


Arterial Blood Base Excess


 -15 mmol/L


(-3-3) 


 


 





 


FiO2 35    


 


Glucose (Fingerstick)


 


 174 mg/dL


(70-99) 


 99 mg/dL


(70-99)


 


Ionized Calcium


 


 


 0.64 mmol/L


(1.13-1.32) 





 


Test


 3/19/20


00:23 3/19/20


00:55 3/19/20


00:58 3/19/20


06:15


 


O2 Saturation 95 % (92-99)    


 


Arterial Blood pH


 7.32


(7.35-7.45) 


 


 





 


Arterial Blood pH (Temp


corrected) 7.30 


 


 


 





 


Arterial Blood pCO2 at


Patient Temp 40 mmHg


(35-46) 


 


 





 


Arterial Blood pCO2 (Temp


correct) 42 mmHg 


 


 


 





 


Arterial Blood pO2 at Patient


Temp 79 mmHg


() 


 


 





 


Arterial Blood pO2 (Temp


corrected) 85 mmHg 


 


 


 





 


Arterial Blood HCO3


 20 mmol/L


(21-28) 


 


 





 


Arterial Blood Base Excess


 -6 mmol/L


(-3-3) 


 


 





 


FiO2 60    


 


Sodium Level


 


 140 mmol/L


(136-145) 


 139 mmol/L


(136-145)


 


Potassium Level


 


 3.8 mmol/L


(3.5-5.1) 


 3.8 mmol/L


(3.5-5.1)


 


Chloride Level


 


 104 mmol/L


() 


 103 mmol/L


()


 


Carbon Dioxide Level


 


 25 mmol/L


(21-32) 


 23 mmol/L


(21-32)


 


Anion Gap  11 (6-14)   13 (6-14) 


 


Blood Urea Nitrogen


 


 38 mg/dL


(7-20) 


 52 mg/dL


(7-20)


 


Creatinine


 


 5.2 mg/dL


(0.6-1.0) 


 5.7 mg/dL


(0.6-1.0)


 


Estimated GFR


(Cockcroft-Gault) 


 8.8 


 


 7.9 





 


BUN/Creatinine Ratio  7 (6-20)   9 (6-20) 


 


Glucose Level


 


 223 mg/dL


(70-99) 


 236 mg/dL


(70-99)


 


Calcium Level


 


 < 5.0 mg/dL


(8.5-10.1) 


 < 5.0 mg/dL


(8.5-10.1)


 


Phosphorus Level


 


 4.4 mg/dL


(2.6-4.7) 


 4.5 mg/dL


(2.6-4.7)


 


Magnesium Level


 


 1.3 mg/dL


(1.8-2.4) 


 1.4 mg/dL


(1.8-2.4)


 


Total Bilirubin


 


 1.0 mg/dL


(0.2-1.0) 


 0.7 mg/dL


(0.2-1.0)


 


Aspartate Amino Transf


(AST/SGOT) 


 127 U/L


(15-37) 


 111 U/L


(15-37)


 


Alanine Aminotransferase


(ALT/SGPT) 


 103 U/L


(14-59) 


 84 U/L (14-59) 





 


Alkaline Phosphatase


 


 57 U/L


() 


 57 U/L


()


 


Total Protein


 


 3.7 g/dL


(6.4-8.2) 


 4.3 g/dL


(6.4-8.2)


 


Albumin


 


 1.8 g/dL


(3.4-5.0) 


 1.5 g/dL


(3.4-5.0)


 


Albumin/Globulin Ratio  0.9 (1.0-1.7)   0.5 (1.0-1.7) 


 


Glucose (Fingerstick)


 


 


 204 mg/dL


(70-99) 





 


White Blood Count


 


 


 


 18.1 x10^3/uL


(4.0-11.0)


 


Red Blood Count


 


 


 


 3.53 x10^6/uL


(3.50-5.40)


 


Hemoglobin


 


 


 


 11.7 g/dL


(12.0-15.5)


 


Hematocrit


 


 


 


 34.8 %


(36.0-47.0)


 


Mean Corpuscular Volume    99 fL () 


 


Mean Corpuscular Hemoglobin    33 pg (25-35) 


 


Mean Corpuscular Hemoglobin


Concent 


 


 


 34 g/dL


(31-37)


 


Red Cell Distribution Width


 


 


 


 13.7 %


(11.5-14.5)


 


Platelet Count


 


 


 


 162 x10^3/uL


(140-400)


 


Neutrophils (%) (Auto)    88 % (31-73) 


 


Lymphocytes (%) (Auto)    7 % (24-48) 


 


Monocytes (%) (Auto)    5 % (0-9) 


 


Eosinophils (%) (Auto)    0 % (0-3) 


 


Basophils (%) (Auto)    0 % (0-3) 


 


Neutrophils # (Auto)


 


 


 


 16.0 x10^3/uL


(1.8-7.7)


 


Lymphocytes # (Auto)


 


 


 


 1.2 x10^3/uL


(1.0-4.8)


 


Monocytes # (Auto)


 


 


 


 0.8 x10^3/uL


(0.0-1.1)


 


Eosinophils # (Auto)


 


 


 


 0.0 x10^3/uL


(0.0-0.7)


 


Basophils # (Auto)


 


 


 


 0.0 x10^3/uL


(0.0-0.2)


 


Test


 3/19/20


06:18 


 


 





 


Glucose (Fingerstick)


 201 mg/dL


(70-99) 


 


 











Laboratory Tests








Test


 3/18/20


09:05 3/18/20


11:38 3/18/20


17:20 3/18/20


17:26


 


O2 Saturation 95 % (92-99)    


 


Arterial Blood pH


 7.23


(7.35-7.45) 


 


 





 


Arterial Blood pCO2 at


Patient Temp 26 mmHg


(35-46) 


 


 





 


Arterial Blood pO2 at Patient


Temp 77 mmHg


() 


 


 





 


Arterial Blood HCO3


 11 mmol/L


(21-28) 


 


 





 


Arterial Blood Base Excess


 -15 mmol/L


(-3-3) 


 


 





 


FiO2 35    


 


Glucose (Fingerstick)


 


 174 mg/dL


(70-99) 


 99 mg/dL


(70-99)


 


Ionized Calcium


 


 


 0.64 mmol/L


(1.13-1.32) 





 


Test


 3/19/20


00:23 3/19/20


00:55 3/19/20


00:58 3/19/20


06:15


 


O2 Saturation 95 % (92-99)    


 


Arterial Blood pH


 7.32


(7.35-7.45) 


 


 





 


Arterial Blood pH (Temp


corrected) 7.30 


 


 


 





 


Arterial Blood pCO2 at


Patient Temp 40 mmHg


(35-46) 


 


 





 


Arterial Blood pCO2 (Temp


correct) 42 mmHg 


 


 


 





 


Arterial Blood pO2 at Patient


Temp 79 mmHg


() 


 


 





 


Arterial Blood pO2 (Temp


corrected) 85 mmHg 


 


 


 





 


Arterial Blood HCO3


 20 mmol/L


(21-28) 


 


 





 


Arterial Blood Base Excess


 -6 mmol/L


(-3-3) 


 


 





 


FiO2 60    


 


Sodium Level


 


 140 mmol/L


(136-145) 


 139 mmol/L


(136-145)


 


Potassium Level


 


 3.8 mmol/L


(3.5-5.1) 


 3.8 mmol/L


(3.5-5.1)


 


Chloride Level


 


 104 mmol/L


() 


 103 mmol/L


()


 


Carbon Dioxide Level


 


 25 mmol/L


(21-32) 


 23 mmol/L


(21-32)


 


Anion Gap  11 (6-14)   13 (6-14) 


 


Blood Urea Nitrogen


 


 38 mg/dL


(7-20) 


 52 mg/dL


(7-20)


 


Creatinine


 


 5.2 mg/dL


(0.6-1.0) 


 5.7 mg/dL


(0.6-1.0)


 


Estimated GFR


(Cockcroft-Gault) 


 8.8 


 


 7.9 





 


BUN/Creatinine Ratio  7 (6-20)   9 (6-20) 


 


Glucose Level


 


 223 mg/dL


(70-99) 


 236 mg/dL


(70-99)


 


Calcium Level


 


 < 5.0 mg/dL


(8.5-10.1) 


 < 5.0 mg/dL


(8.5-10.1)


 


Phosphorus Level


 


 4.4 mg/dL


(2.6-4.7) 


 4.5 mg/dL


(2.6-4.7)


 


Magnesium Level


 


 1.3 mg/dL


(1.8-2.4) 


 1.4 mg/dL


(1.8-2.4)


 


Total Bilirubin


 


 1.0 mg/dL


(0.2-1.0) 


 0.7 mg/dL


(0.2-1.0)


 


Aspartate Amino Transf


(AST/SGOT) 


 127 U/L


(15-37) 


 111 U/L


(15-37)


 


Alanine Aminotransferase


(ALT/SGPT) 


 103 U/L


(14-59) 


 84 U/L (14-59) 





 


Alkaline Phosphatase


 


 57 U/L


() 


 57 U/L


()


 


Total Protein


 


 3.7 g/dL


(6.4-8.2) 


 4.3 g/dL


(6.4-8.2)


 


Albumin


 


 1.8 g/dL


(3.4-5.0) 


 1.5 g/dL


(3.4-5.0)


 


Albumin/Globulin Ratio  0.9 (1.0-1.7)   0.5 (1.0-1.7) 


 


Glucose (Fingerstick)


 


 


 204 mg/dL


(70-99) 





 


White Blood Count


 


 


 


 18.1 x10^3/uL


(4.0-11.0)


 


Red Blood Count


 


 


 


 3.53 x10^6/uL


(3.50-5.40)


 


Hemoglobin


 


 


 


 11.7 g/dL


(12.0-15.5)


 


Hematocrit


 


 


 


 34.8 %


(36.0-47.0)


 


Mean Corpuscular Volume    99 fL () 


 


Mean Corpuscular Hemoglobin    33 pg (25-35) 


 


Mean Corpuscular Hemoglobin


Concent 


 


 


 34 g/dL


(31-37)


 


Red Cell Distribution Width


 


 


 


 13.7 %


(11.5-14.5)


 


Platelet Count


 


 


 


 162 x10^3/uL


(140-400)


 


Neutrophils (%) (Auto)    88 % (31-73) 


 


Lymphocytes (%) (Auto)    7 % (24-48) 


 


Monocytes (%) (Auto)    5 % (0-9) 


 


Eosinophils (%) (Auto)    0 % (0-3) 


 


Basophils (%) (Auto)    0 % (0-3) 


 


Neutrophils # (Auto)


 


 


 


 16.0 x10^3/uL


(1.8-7.7)


 


Lymphocytes # (Auto)


 


 


 


 1.2 x10^3/uL


(1.0-4.8)


 


Monocytes # (Auto)


 


 


 


 0.8 x10^3/uL


(0.0-1.1)


 


Eosinophils # (Auto)


 


 


 


 0.0 x10^3/uL


(0.0-0.7)


 


Basophils # (Auto)


 


 


 


 0.0 x10^3/uL


(0.0-0.2)


 


Test


 3/19/20


06:18 


 


 





 


Glucose (Fingerstick)


 201 mg/dL


(70-99) 


 


 











Medications





Active Scripts








 Medications  Dose


 Route/Sig


 Max Daily Dose Days Date Category


 


 Bisoprolol


 Fumarate 5 Mg


 Tablet  10 Mg


 PO DAILY


   3/16/20 Reported











Impression


.


IMPRESSION:


1.  Acute hypoxemic respiratory failure secondary to acute pancreatitis, sepsis,


abdominal distention, and pneumonia.


2.  Gallstone pancreatitis.


3.  Severe metabolic acidosis.


4.  Acute kidney injury.


5.  Acute gallstone pancreatitis.


6.  Hypoalbuminemia.


7.  Hypocalcemia.





Plan


.


WILL CONTINUE BIPAP


D/W WITH DR HEIN


I THINK EVENTUALLY SHE WILL DECLINE AND BE INTUBATE


CXR NO CHANGE


HD


TPN


FOLLOW SURGERY INPUT











STEVE MIRANDA MD              Mar 19, 2020 09:04

## 2020-03-19 NOTE — NUR
Pharmacy TPN Dosing Note



S: JESENIA RICH is a 49 year old F Currently receiving Central Continuous TPN started 
03/18/20



B:Pertinent PMH: Necrotizing pancreatitis



Height: 5 feet, 8 inches

Weight: 95.2 kg



Current diet: NPO 



LABS:

Sodium:    139 

Potassium: 3.8 

Chloride:  103 

Calcium:   5.0 

Corrected Calcium: 7.00 

Magnesium: 1.4 

CO2:       23 

SCr:       5.7 

Glucose:   236, 201, 176 

Albumin:   1.5 

AST:       111 

ALT:       84 



TPN FORMULA:

TPN TYPE:  Central Continuous

AMINO ACIDS:         60 gm

DEXTROSE:            195 gm

LIPIDS:              20 gm

SODIUM CHLORIDE:     90 mEq

MAGNESIUM:           10 mEq

CALCIUM:             20 mEq

MULTIPLE VITAMIN:    10 ml

TRACE ELEMENTS:      0.5 ml



TPN PLAN:  

-Serum calcium still not detectable, starting calcium gluc drip for delivery of 3g in 20 
hrs. Increase calcium gluconate in TPN to 20 mEq/day.

-Serum mag low, give mag sulfate 4g bolus. Add mag sulfate 10 mEq to TPN.

-BMP, mag, phos, triglycerides tomorrow.





R: Continue TPN @ current rate and above formula. 

Will monitor electrolytes, glucose, and tolerance to TPN.



 VALERIE SNELL [facility], 03/19/20 [id number]

## 2020-03-19 NOTE — PDOC
Infectious Disease Note


Subjective:


Subjective


pt c/o sob on bipap


denies any fever 


abdo pain is under control


undergoing dialysis


off pressors


d/w rn





Vital Signs:


Vital Signs





Vital Signs








  Date Time  Temp Pulse Resp B/P (MAP) Pulse Ox O2 Delivery O2 Flow Rate FiO2


 


3/19/20 08:46     99 BiPAP/CPAP  


 


3/19/20 06:03   31     


 


3/19/20 06:00  125  97/53 (68)    


 


3/19/20 05:53       6.0 


 


3/19/20 04:00 98.8       





 98.8       











Physical Exam:


PHYSICAL EXAM


GENERAL: pt on bipap, Lethargic, alert, awake, ill-appearing female in ICU bed, 

cooperative.


HEENT:  Normocephalic, atraumatic.  Mild icterus.  No thrush.  Oral mucosa dry.


NECK:  Supple. Temp HDC cath in place


LUNGS:  Decreased breath sounds at the bases.  No wheezing.


HEART:  S1, S2, tachycardia.  No murmurs.


ABDOMEN:  Soft, mildly distended.  Mild tenderness on deep palpation upper


abdomen.  No rebound, no guarding.


EXTREMITIES: + edema, no cyanosis.


DERMATOLOGIC:  Warm and dry.  No generalized rash.  PICC line in right upper


extremity looks okay.


CENTRAL NERVOUS SYSTEM:  Alert and oriented x 3, grossly nonfocal.


PSYCHIATRIC:  Cooperative.





Medications:


Inpatient Meds:





Current Medications








 Medications


  (Trade)  Dose


 Ordered  Sig/Yee  Start Time


 Stop Time Status Last Admin


Dose Admin


 


 Albumin Human  200 ml @ 


 200 mls/hr  1X PRN  PRN  3/19/20 09:00


 3/19/20 14:59   





 


 Albuterol Sulfate


  (Ventolin Neb


 Soln)  2.5 mg  1X  ONCE  3/17/20 22:30


 3/17/20 22:31 DC 3/18/20 00:56


2.5 MG


 


 Atenolol


  (Tenormin)  100 mg  DAILY  3/17/20 09:00


 3/16/20 20:08 DC  





 


 Calcium Carbonate/


 Glycine


  (Tums)  500 mg  PRN [id number]  PRN  3/18/20 17:45


     





 


 Calcium Chloride


 1000 mg/Sodium


 Chloride  110 ml @ 


 220 mls/hr  1X  ONCE  3/17/20 22:30


 3/17/20 22:59 DC 3/17/20 22:11


220 MLS/HR


 


 Calcium Chloride


 3000 mg/Sodium


 Chloride  1,030 ml @ 


 50 mls/hr  [id number]  3/19/20 08:00


    3/19/20 08:26


50 MLS/HR


 


 Calcium Gluconate


  (Calcium


 Gluconate)  2,000 mg  1X  ONCE  3/19/20 02:15


 3/19/20 02:16 DC 3/19/20 02:19


2,000 MG


 


 Calcium Gluconate


 1000 mg/Sodium


 Chloride  110 ml @ 


 220 mls/hr  1X  ONCE  3/18/20 03:30


 3/18/20 03:59 DC 3/18/20 03:21


220 MLS/HR


 


 Calcium Gluconate


 2000 mg/Sodium


 Chloride  120 ml @ 


 220 mls/hr  1X  ONCE  3/18/20 07:30


 3/18/20 08:02 DC 3/18/20 09:05


220 MLS/HR


 


 Dextrose


  (Dextrose


 50%-Water Syringe)  12.5 gm  PRN Q15MIN  PRN  3/16/20 09:30


     





 


 Diphenhydramine


 HCl


  (Benadryl)  25 mg  1X PRN  PRN  3/19/20 09:00


 3/20/20 08:59   





 


 Fentanyl Citrate


  (Fentanyl 2ml


 Vial)  100 mcg  STK-MED ONCE  3/16/20 03:18


 3/16/20 03:18 DC  





 


 Furosemide


  (Lasix)  40 mg  1X  ONCE  3/17/20 22:30


 3/17/20 22:31 DC 3/17/20 22:12


40 MG


 


 Hydromorphone HCl


  (Dilaudid)  1 mg  PRN Q3HRS  PRN  3/17/20 12:00


    3/19/20 05:53


1 MG


 


 Info


  (CONTRAST GIVEN


 -- Rx MONITORING)  1 each  PRN DAILY  PRN  3/17/20 17:00


 3/19/20 16:59   





 


 Info


  (PHARMACY


 MONITORING -- do


 not chart)  1 each  PRN DAILY  PRN  3/19/20 09:00


     





 


 Info


  (Tpn Per


 Pharmacy)  1 each  PRN DAILY  PRN  3/18/20 12:30


   UNV  





 


 Insulin Human


 Lispro


  (HumaLOG)  0-9 UNITS  Q6HRS  3/16/20 09:30


    3/19/20 06:35


5 UNITS


 


 Insulin Human


 Regular


  (HumuLIN R VIAL)  5 unit  1X  ONCE  3/17/20 22:30


 3/17/20 22:31 DC 3/17/20 22:14


5 UNIT


 


 Iohexol


  (Omnipaque 300


 Mg/ml)  60 ml  1X  ONCE  3/17/20 17:00


 3/17/20 17:01 DC 3/17/20 17:20


60 ML


 


 Iohexol


  (Omnipaque 350


 Mg/ml)  90 ml  1X  ONCE  3/16/20 03:30


 3/16/20 03:31 DC 3/16/20 03:25


90 ML


 


 Ketorolac


 Tromethamine


  (Toradol 30mg


 Vial)  30 mg  1X  ONCE  3/16/20 03:00


 3/16/20 03:01 DC 3/16/20 02:54


30 MG


 


 Lidocaine HCl


  (Buffered


 Lidocaine 1%)  6 ml  1X  ONCE  3/18/20 10:15


 3/18/20 10:16 DC 3/18/20 10:26


4 ML


 


 Lidocaine HCl


  (Xylocaine-Mpf


 1% 2ml Vial)  2 ml  STK-MED ONCE  3/18/20 08:47


 3/18/20 08:47 DC  





 


 Lorazepam


  (Ativan Inj)  1 mg  PRN Q4HRS  PRN  3/19/20 09:00


    3/19/20 09:05


1 MG


 


 Magnesium Sulfate  50 ml @ 25


 mls/hr  1X  ONCE  3/18/20 03:00


 3/18/20 04:59 DC 3/18/20 02:57


25 MLS/HR


 


 Meropenem 1 gm/


 Sodium Chloride  100 ml @ 


 200 mls/hr  Q8HRS  3/17/20 20:00


 3/18/20 08:48 DC 3/18/20 05:45


200 MLS/HR


 


 Meropenem 500 mg/


 Sodium Chloride  50 ml @ 


 100 mls/hr  Q12HR  3/18/20 18:00


    3/18/20 17:16


100 MLS/HR


 


 Metoprolol


 Tartrate


  (Lopressor Vial)  5 mg  Q6HRS  3/17/20 10:15


    3/19/20 02:18


5 MG


 


 Morphine Sulfate


  (Morphine


 Sulfate)  2 mg  PRN Q2HR  PRN  3/16/20 05:00


 3/17/20 14:15 DC 3/17/20 12:26


2 MG


 


 Norepinephrine


 Bitartrate 8 mg/


 Dextrose  258 ml @ 


 17.299 mls/


 hr  CONT  PRN  3/17/20 15:30


    3/18/20 11:34


13.44 MLS/HR


 


 Ondansetron HCl


  (Zofran)  4 mg  PRN Q4HRS  PRN  3/16/20 09:30


    3/18/20 23:56


4 MG


 


 Pantoprazole


 Sodium


  (PROTONIX VIAL


 for IV PUSH)  40 mg  DAILYAC  3/16/20 11:30


    3/18/20 09:05


40 MG


 


 Piperacillin Sod/


 Tazobactam Sod


 4.5 gm/Sodium


 Chloride  100 ml @ 


 200 mls/hr  1X  ONCE  3/16/20 06:00


 3/16/20 06:29 DC 3/16/20 05:44


200 MLS/HR


 


 Prochlorperazine


 Edisylate


  (Compazine)  10 mg  PRN Q6HRS  PRN  3/16/20 17:45


    3/17/20 00:42


10 MG


 


 Sodium


 Bicarbonate 50


 meq/Sodium


 Chloride  1,050 ml @ 


 200 mls/hr  Q5H15M  3/18/20 07:30


    3/19/20 07:32


200 MLS/HR


 


 Sodium Chloride  1,000 ml @ 


 400 mls/hr  Q2H30M PRN  3/19/20 08:56


 3/19/20 20:55   





 


 Sodium Chloride


 90 meq/Calcium


 Gluconate 10 meq/


 Multivitamins 10


 ml/Chromium/


 Copper/Manganese/


 Seleni/Zn 0.5 ml/


 Total Parenteral


 Nutrition/Amino


 Acids/Dextrose/


 Fat Emulsion


 Intravenous  1,512 ml @ 


 63 mls/hr  TPN  CONT  3/18/20 22:00


 3/19/20 21:59  3/18/20 22:06


63 MLS/HR


 


 Sodium Chloride


 90 meq/Calcium


 Gluconate 10 meq/


 Multivitamins 10


 ml/Chromium/


 Copper/Manganese/


 Seleni/Zn 1 ml/


 Total Parenteral


 Nutrition/Amino


 Acids/Dextrose/


 Fat Emulsion


 Intravenous  55.005 ml


  @ 2.292


 mls/hr  TPN  CONT  3/18/20 22:00


 3/18/20 12:33 DC  














Labs:


Lab





Laboratory Tests








Test


 3/18/20


11:38 3/18/20


17:20 3/18/20


17:26 3/19/20


00:23


 


Glucose (Fingerstick)


 174 mg/dL


(70-99) 


 99 mg/dL


(70-99) 





 


Ionized Calcium


 


 0.64 mmol/L


(1.13-1.32) 


 





 


O2 Saturation    95 % (92-99) 


 


Arterial Blood pH


 


 


 


 7.32


(7.35-7.45)


 


Arterial Blood pH (Temp


corrected) 


 


 


 7.30 





 


Arterial Blood pCO2 at


Patient Temp 


 


 


 40 mmHg


(35-46)


 


Arterial Blood pCO2 (Temp


correct) 


 


 


 42 mmHg 





 


Arterial Blood pO2 at Patient


Temp 


 


 


 79 mmHg


()


 


Arterial Blood pO2 (Temp


corrected) 


 


 


 85 mmHg 





 


Arterial Blood HCO3


 


 


 


 20 mmol/L


(21-28)


 


Arterial Blood Base Excess


 


 


 


 -6 mmol/L


(-3-3)


 


FiO2    60 


 


Test


 3/19/20


00:55 3/19/20


00:58 3/19/20


06:15 3/19/20


06:18


 


Sodium Level


 140 mmol/L


(136-145) 


 139 mmol/L


(136-145) 





 


Potassium Level


 3.8 mmol/L


(3.5-5.1) 


 3.8 mmol/L


(3.5-5.1) 





 


Chloride Level


 104 mmol/L


() 


 103 mmol/L


() 





 


Carbon Dioxide Level


 25 mmol/L


(21-32) 


 23 mmol/L


(21-32) 





 


Anion Gap 11 (6-14)   13 (6-14)  


 


Blood Urea Nitrogen


 38 mg/dL


(7-20) 


 52 mg/dL


(7-20) 





 


Creatinine


 5.2 mg/dL


(0.6-1.0) 


 5.7 mg/dL


(0.6-1.0) 





 


Estimated GFR


(Cockcroft-Gault) 8.8 


 


 7.9 


 





 


BUN/Creatinine Ratio 7 (6-20)   9 (6-20)  


 


Glucose Level


 223 mg/dL


(70-99) 


 236 mg/dL


(70-99) 





 


Calcium Level


 < 5.0 mg/dL


(8.5-10.1) 


 < 5.0 mg/dL


(8.5-10.1) 





 


Phosphorus Level


 4.4 mg/dL


(2.6-4.7) 


 4.5 mg/dL


(2.6-4.7) 





 


Magnesium Level


 1.3 mg/dL


(1.8-2.4) 


 1.4 mg/dL


(1.8-2.4) 





 


Total Bilirubin


 1.0 mg/dL


(0.2-1.0) 


 0.7 mg/dL


(0.2-1.0) 





 


Aspartate Amino Transf


(AST/SGOT) 127 U/L


(15-37) 


 111 U/L


(15-37) 





 


Alanine Aminotransferase


(ALT/SGPT) 103 U/L


(14-59) 


 84 U/L (14-59) 


 





 


Alkaline Phosphatase


 57 U/L


() 


 57 U/L


() 





 


Total Protein


 3.7 g/dL


(6.4-8.2) 


 4.3 g/dL


(6.4-8.2) 





 


Albumin


 1.8 g/dL


(3.4-5.0) 


 1.5 g/dL


(3.4-5.0) 





 


Albumin/Globulin Ratio 0.9 (1.0-1.7)   0.5 (1.0-1.7)  


 


Glucose (Fingerstick)


 


 204 mg/dL


(70-99) 


 201 mg/dL


(70-99)


 


White Blood Count


 


 


 18.1 x10^3/uL


(4.0-11.0) 





 


Red Blood Count


 


 


 3.53 x10^6/uL


(3.50-5.40) 





 


Hemoglobin


 


 


 11.7 g/dL


(12.0-15.5) 





 


Hematocrit


 


 


 34.8 %


(36.0-47.0) 





 


Mean Corpuscular Volume   99 fL ()  


 


Mean Corpuscular Hemoglobin   33 pg (25-35)  


 


Mean Corpuscular Hemoglobin


Concent 


 


 34 g/dL


(31-37) 





 


Red Cell Distribution Width


 


 


 13.7 %


(11.5-14.5) 





 


Platelet Count


 


 


 162 x10^3/uL


(140-400) 





 


Neutrophils (%) (Auto)   88 % (31-73)  


 


Lymphocytes (%) (Auto)   7 % (24-48)  


 


Monocytes (%) (Auto)   5 % (0-9)  


 


Eosinophils (%) (Auto)   0 % (0-3)  


 


Basophils (%) (Auto)   0 % (0-3)  


 


Neutrophils # (Auto)


 


 


 16.0 x10^3/uL


(1.8-7.7) 





 


Lymphocytes # (Auto)


 


 


 1.2 x10^3/uL


(1.0-4.8) 





 


Monocytes # (Auto)


 


 


 0.8 x10^3/uL


(0.0-1.1) 





 


Eosinophils # (Auto)


 


 


 0.0 x10^3/uL


(0.0-0.7) 





 


Basophils # (Auto)


 


 


 0.0 x10^3/uL


(0.0-0.2) 














Objective:


Assessment:


 


Acute pancreatitis, early developing necrosis


Cholelithiasis


Leucocytosis


JED,Hyperkalemia, Metabolic acidosis on HD


Acute hypoxic resp failure 


hypocalcemia 


Prediabetes


HTN





Plan:


Plan of Care


cont merrem ,renal dosing, pharmacy to assist 


f/u labs and cults


cont supportive care


d/w IVAN MENDOZA MD           Mar 19, 2020 09:14

## 2020-03-20 VITALS — DIASTOLIC BLOOD PRESSURE: 81 MMHG | SYSTOLIC BLOOD PRESSURE: 114 MMHG

## 2020-03-20 VITALS — DIASTOLIC BLOOD PRESSURE: 51 MMHG | SYSTOLIC BLOOD PRESSURE: 118 MMHG

## 2020-03-20 VITALS — DIASTOLIC BLOOD PRESSURE: 56 MMHG | SYSTOLIC BLOOD PRESSURE: 104 MMHG

## 2020-03-20 VITALS — SYSTOLIC BLOOD PRESSURE: 107 MMHG | DIASTOLIC BLOOD PRESSURE: 57 MMHG

## 2020-03-20 VITALS — SYSTOLIC BLOOD PRESSURE: 124 MMHG | DIASTOLIC BLOOD PRESSURE: 48 MMHG

## 2020-03-20 VITALS — SYSTOLIC BLOOD PRESSURE: 105 MMHG | DIASTOLIC BLOOD PRESSURE: 55 MMHG

## 2020-03-20 VITALS — SYSTOLIC BLOOD PRESSURE: 106 MMHG | DIASTOLIC BLOOD PRESSURE: 59 MMHG

## 2020-03-20 VITALS — DIASTOLIC BLOOD PRESSURE: 53 MMHG | SYSTOLIC BLOOD PRESSURE: 102 MMHG

## 2020-03-20 VITALS — DIASTOLIC BLOOD PRESSURE: 54 MMHG | SYSTOLIC BLOOD PRESSURE: 129 MMHG

## 2020-03-20 VITALS — DIASTOLIC BLOOD PRESSURE: 44 MMHG | SYSTOLIC BLOOD PRESSURE: 102 MMHG

## 2020-03-20 VITALS — SYSTOLIC BLOOD PRESSURE: 121 MMHG | DIASTOLIC BLOOD PRESSURE: 75 MMHG

## 2020-03-20 VITALS — SYSTOLIC BLOOD PRESSURE: 115 MMHG | DIASTOLIC BLOOD PRESSURE: 70 MMHG

## 2020-03-20 VITALS — DIASTOLIC BLOOD PRESSURE: 45 MMHG | SYSTOLIC BLOOD PRESSURE: 113 MMHG

## 2020-03-20 VITALS — DIASTOLIC BLOOD PRESSURE: 62 MMHG | SYSTOLIC BLOOD PRESSURE: 130 MMHG

## 2020-03-20 VITALS — SYSTOLIC BLOOD PRESSURE: 81 MMHG | DIASTOLIC BLOOD PRESSURE: 60 MMHG

## 2020-03-20 VITALS — DIASTOLIC BLOOD PRESSURE: 58 MMHG | SYSTOLIC BLOOD PRESSURE: 106 MMHG

## 2020-03-20 VITALS — SYSTOLIC BLOOD PRESSURE: 123 MMHG | DIASTOLIC BLOOD PRESSURE: 54 MMHG

## 2020-03-20 VITALS — SYSTOLIC BLOOD PRESSURE: 117 MMHG | DIASTOLIC BLOOD PRESSURE: 4 MMHG

## 2020-03-20 VITALS — DIASTOLIC BLOOD PRESSURE: 71 MMHG | SYSTOLIC BLOOD PRESSURE: 104 MMHG

## 2020-03-20 VITALS — SYSTOLIC BLOOD PRESSURE: 121 MMHG | DIASTOLIC BLOOD PRESSURE: 49 MMHG

## 2020-03-20 VITALS — SYSTOLIC BLOOD PRESSURE: 125 MMHG | DIASTOLIC BLOOD PRESSURE: 71 MMHG

## 2020-03-20 VITALS — SYSTOLIC BLOOD PRESSURE: 118 MMHG | DIASTOLIC BLOOD PRESSURE: 71 MMHG

## 2020-03-20 VITALS — SYSTOLIC BLOOD PRESSURE: 84 MMHG | DIASTOLIC BLOOD PRESSURE: 44 MMHG

## 2020-03-20 VITALS — DIASTOLIC BLOOD PRESSURE: 48 MMHG | SYSTOLIC BLOOD PRESSURE: 96 MMHG

## 2020-03-20 VITALS — DIASTOLIC BLOOD PRESSURE: 56 MMHG | SYSTOLIC BLOOD PRESSURE: 113 MMHG

## 2020-03-20 VITALS — DIASTOLIC BLOOD PRESSURE: 44 MMHG | SYSTOLIC BLOOD PRESSURE: 84 MMHG

## 2020-03-20 LAB
ALBUMIN SERPL-MCNC: 1 G/DL (ref 3.4–5)
ALBUMIN/GLOB SERPL: 0.4 {RATIO} (ref 1–1.7)
ALP SERPL-CCNC: 55 U/L (ref 46–116)
ALT SERPL-CCNC: 45 U/L (ref 14–59)
ANION GAP SERPL CALC-SCNC: 14 MMOL/L (ref 6–14)
AST SERPL-CCNC: 56 U/L (ref 15–37)
BASE EXCESS ABG: -2 MMOL/L (ref -3–3)
BASE EXCESS ABG: 7 MMOL/L (ref -3–3)
BASOPHILS # BLD AUTO: 0 X10^3/UL (ref 0–0.2)
BASOPHILS NFR BLD: 0 % (ref 0–3)
BILIRUB SERPL-MCNC: 0.5 MG/DL (ref 0.2–1)
BUN SERPL-MCNC: 41 MG/DL (ref 7–20)
BUN/CREAT SERPL: 8 (ref 6–20)
CALCIUM SERPL-MCNC: < 5 MG/DL (ref 8.5–10.1)
CHLORIDE SERPL-SCNC: 103 MMOL/L (ref 98–107)
CO2 SERPL-SCNC: 22 MMOL/L (ref 21–32)
CREAT SERPL-MCNC: 5 MG/DL (ref 0.6–1)
EOSINOPHIL NFR BLD: 0 % (ref 0–3)
EOSINOPHIL NFR BLD: 0.1 X10^3/UL (ref 0–0.7)
ERYTHROCYTE [DISTWIDTH] IN BLOOD BY AUTOMATED COUNT: 13.4 % (ref 11.5–14.5)
GFR SERPLBLD BASED ON 1.73 SQ M-ARVRAT: 9.2 ML/MIN
GLOBULIN SER-MCNC: 2.8 G/DL (ref 2.2–3.8)
GLUCOSE SERPL-MCNC: 219 MG/DL (ref 70–99)
HCO3 BLDA-SCNC: 24 MMOL/L (ref 21–28)
HCO3 BLDA-SCNC: 33 MMOL/L (ref 21–28)
HCT VFR BLD CALC: 32 % (ref 36–47)
HGB BLD-MCNC: 10.8 G/DL (ref 12–15.5)
INSPIRATION SETTING TIME VENT: (no result)
INSPIRATION SETTING TIME VENT: 60
LYMPHOCYTES # BLD: 0.8 X10^3/UL (ref 1–4.8)
LYMPHOCYTES NFR BLD AUTO: 5 % (ref 24–48)
MAGNESIUM SERPL-MCNC: 1.9 MG/DL (ref 1.8–2.4)
MCH RBC QN AUTO: 33 PG (ref 25–35)
MCHC RBC AUTO-ENTMCNC: 34 G/DL (ref 31–37)
MCV RBC AUTO: 98 FL (ref 79–100)
MONO #: 0.7 X10^3/UL (ref 0–1.1)
MONOCYTES NFR BLD: 4 % (ref 0–9)
NEUT #: 15.3 X10^3/UL (ref 1.8–7.7)
NEUTROPHILS NFR BLD AUTO: 91 % (ref 31–73)
PCO2 BLDA: 43 MMHG (ref 35–46)
PCO2 BLDA: 53 MMHG (ref 35–46)
PHOSPHATE SERPL-MCNC: 2.5 MG/DL (ref 2.6–4.7)
PLATELET # BLD AUTO: 141 X10^3/UL (ref 140–400)
PO2 BLDA: 136 MMHG (ref 75–108)
PO2 BLDA: 51 MMHG (ref 75–108)
POTASSIUM SERPL-SCNC: 2.4 MMOL/L (ref 3.5–5.1)
PROT SERPL-MCNC: 3.8 G/DL (ref 6.4–8.2)
RBC # BLD AUTO: 3.26 X10^6/UL (ref 3.5–5.4)
SAO2 % BLDA: 84 % (ref 92–99)
SAO2 % BLDA: 99 % (ref 92–99)
SODIUM SERPL-SCNC: 139 MMOL/L (ref 136–145)
TRIGL SERPL-MCNC: 214 MG/DL (ref 0–150)
WBC # BLD AUTO: 16.8 X10^3/UL (ref 4–11)

## 2020-03-20 RX ADMIN — PANTOPRAZOLE SODIUM SCH MG: 40 INJECTION, POWDER, FOR SOLUTION INTRAVENOUS at 08:20

## 2020-03-20 RX ADMIN — METOPROLOL TARTRATE SCH MG: 5 INJECTION INTRAVENOUS at 23:12

## 2020-03-20 RX ADMIN — METOPROLOL TARTRATE SCH MG: 5 INJECTION INTRAVENOUS at 00:07

## 2020-03-20 RX ADMIN — HYDROMORPHONE HYDROCHLORIDE PRN MG: 2 INJECTION INTRAMUSCULAR; INTRAVENOUS; SUBCUTANEOUS at 16:37

## 2020-03-20 RX ADMIN — INSULIN LISPRO SCH UNITS: 100 INJECTION, SOLUTION INTRAVENOUS; SUBCUTANEOUS at 23:17

## 2020-03-20 RX ADMIN — INSULIN LISPRO SCH UNITS: 100 INJECTION, SOLUTION INTRAVENOUS; SUBCUTANEOUS at 00:26

## 2020-03-20 RX ADMIN — HYDROMORPHONE HYDROCHLORIDE PRN MG: 2 INJECTION INTRAMUSCULAR; INTRAVENOUS; SUBCUTANEOUS at 08:30

## 2020-03-20 RX ADMIN — SODIUM BICARBONATE SCH MLS/HR: 84 INJECTION, SOLUTION INTRAVENOUS at 21:01

## 2020-03-20 RX ADMIN — Medication PRN EACH: at 12:35

## 2020-03-20 RX ADMIN — METOPROLOL TARTRATE SCH MG: 5 INJECTION INTRAVENOUS at 05:52

## 2020-03-20 RX ADMIN — METOPROLOL TARTRATE SCH MG: 5 INJECTION INTRAVENOUS at 12:00

## 2020-03-20 RX ADMIN — HYDROMORPHONE HYDROCHLORIDE PRN MG: 2 INJECTION INTRAMUSCULAR; INTRAVENOUS; SUBCUTANEOUS at 19:31

## 2020-03-20 RX ADMIN — METOPROLOL TARTRATE SCH MG: 5 INJECTION INTRAVENOUS at 18:00

## 2020-03-20 RX ADMIN — MEROPENEM SCH MLS/HR: 500 INJECTION, POWDER, FOR SOLUTION INTRAVENOUS at 08:20

## 2020-03-20 RX ADMIN — AMIODARONE HYDROCHLORIDE PRN MLS/HR: 50 INJECTION, SOLUTION INTRAVENOUS at 21:02

## 2020-03-20 RX ADMIN — INSULIN LISPRO SCH UNITS: 100 INJECTION, SOLUTION INTRAVENOUS; SUBCUTANEOUS at 18:00

## 2020-03-20 RX ADMIN — HYDROMORPHONE HYDROCHLORIDE PRN MG: 2 INJECTION INTRAMUSCULAR; INTRAVENOUS; SUBCUTANEOUS at 13:45

## 2020-03-20 RX ADMIN — MEROPENEM SCH MLS/HR: 500 INJECTION, POWDER, FOR SOLUTION INTRAVENOUS at 21:01

## 2020-03-20 RX ADMIN — HYDROMORPHONE HYDROCHLORIDE PRN MG: 2 INJECTION INTRAMUSCULAR; INTRAVENOUS; SUBCUTANEOUS at 23:12

## 2020-03-20 RX ADMIN — SODIUM BICARBONATE SCH MLS/HR: 84 INJECTION, SOLUTION INTRAVENOUS at 07:19

## 2020-03-20 RX ADMIN — INSULIN LISPRO SCH UNITS: 100 INJECTION, SOLUTION INTRAVENOUS; SUBCUTANEOUS at 12:00

## 2020-03-20 RX ADMIN — SODIUM BICARBONATE SCH MLS/HR: 84 INJECTION, SOLUTION INTRAVENOUS at 01:08

## 2020-03-20 RX ADMIN — INSULIN LISPRO SCH UNITS: 100 INJECTION, SOLUTION INTRAVENOUS; SUBCUTANEOUS at 07:22

## 2020-03-20 RX ADMIN — HYDROMORPHONE HYDROCHLORIDE PRN MG: 2 INJECTION INTRAMUSCULAR; INTRAVENOUS; SUBCUTANEOUS at 03:33

## 2020-03-20 RX ADMIN — MAGNESIUM SULFATE HEPTAHYDRATE SCH MLS/HR: 500 INJECTION, SOLUTION INTRAMUSCULAR; INTRAVENOUS at 04:26

## 2020-03-20 NOTE — NUR
spoke on telephone with patients eldest child, petra hamilton. discussed needing one person 
to be her decision maker and advocate that is legally related to the patient. pt does not 
have a living will or healthcare advancement.

discussed pt physical and mental aspects. discussed when any consents or decisions need to 
be made, that this person would be contacted and would need to reply quickly. discussed that 
lexy and / or iveth, the pts mother, would take over as main person to make decisions for 
pt.

questions answered. Petra chose to talk to her sister, Beth and will call us back with 
decision.

## 2020-03-20 NOTE — PDOC
SAURAV NASH APRN 3/20/20 1056:


SURGICAL PROGRESS NOTE


Subjective


bipap


lethargic during exam


Vital Signs





Vital Signs








  Date Time  Temp Pulse Resp B/P (MAP) Pulse Ox O2 Delivery O2 Flow Rate FiO2


 


3/20/20 10:00  115 25 104/71 (82) 100 BiPAP/CPAP  


 


3/20/20 07:00 98.2       





 98.2       


 


3/20/20 04:03       6.0 








I&O











Intake and Output 


 


 3/20/20





 07:00


 


Intake Total 7438.1 ml


 


Output Total 1045 ml


 


Balance 6393.1 ml


 


 


 


Intake Oral 0 ml


 


IV Total 4000.1 ml


 


Other 3438 ml


 


Output Urine Total 45 ml


 


Stool Total 1000 ml








PATIENT HAS A VILLASENOR:  Yes


General:  Other (ill appearing )


Abdomen:  Soft, Other (mildly distended , moans when epigastric area palpated )


Labs





Laboratory Tests








Test


 3/18/20


11:38 3/18/20


17:20 3/18/20


17:26 3/19/20


00:23


 


Glucose (Fingerstick)


 174 mg/dL


(70-99) 


 99 mg/dL


(70-99) 





 


Ionized Calcium


 


 0.64 mmol/L


(1.13-1.32) 


 





 


Hepatitis B Surface Antibody,


Quant 


 <3.1 mIU/mL


(Immunity>9.9) 


 





 


O2 Saturation    95 % (92-99) 


 


Arterial Blood pH


 


 


 


 7.32


(7.35-7.45)


 


Arterial Blood pH (Temp


corrected) 


 


 


 7.30 





 


Arterial Blood pCO2 at


Patient Temp 


 


 


 40 mmHg


(35-46)


 


Arterial Blood pCO2 (Temp


correct) 


 


 


 42 mmHg 





 


Arterial Blood pO2 at Patient


Temp 


 


 


 79 mmHg


()


 


Arterial Blood pO2 (Temp


corrected) 


 


 


 85 mmHg 





 


Arterial Blood HCO3


 


 


 


 20 mmol/L


(21-28)


 


Arterial Blood Base Excess


 


 


 


 -6 mmol/L


(-3-3)


 


FiO2    60 


 


Test


 3/19/20


00:55 3/19/20


00:58 3/19/20


06:15 3/19/20


06:18


 


Sodium Level


 140 mmol/L


(136-145) 


 139 mmol/L


(136-145) 





 


Potassium Level


 3.8 mmol/L


(3.5-5.1) 


 3.8 mmol/L


(3.5-5.1) 





 


Chloride Level


 104 mmol/L


() 


 103 mmol/L


() 





 


Carbon Dioxide Level


 25 mmol/L


(21-32) 


 23 mmol/L


(21-32) 





 


Anion Gap 11 (6-14)   13 (6-14)  


 


Blood Urea Nitrogen


 38 mg/dL


(7-20) 


 52 mg/dL


(7-20) 





 


Creatinine


 5.2 mg/dL


(0.6-1.0) 


 5.7 mg/dL


(0.6-1.0) 





 


Estimated GFR


(Cockcroft-Gault) 8.8 


 


 7.9 


 





 


BUN/Creatinine Ratio 7 (6-20)   9 (6-20)  


 


Glucose Level


 223 mg/dL


(70-99) 


 236 mg/dL


(70-99) 





 


Calcium Level


 < 5.0 mg/dL


(8.5-10.1) 


 < 5.0 mg/dL


(8.5-10.1) 





 


Phosphorus Level


 4.4 mg/dL


(2.6-4.7) 


 4.5 mg/dL


(2.6-4.7) 





 


Magnesium Level


 1.3 mg/dL


(1.8-2.4) 


 1.4 mg/dL


(1.8-2.4) 





 


Total Bilirubin


 1.0 mg/dL


(0.2-1.0) 


 0.7 mg/dL


(0.2-1.0) 





 


Aspartate Amino Transf


(AST/SGOT) 127 U/L


(15-37) 


 111 U/L


(15-37) 





 


Alanine Aminotransferase


(ALT/SGPT) 103 U/L


(14-59) 


 84 U/L (14-59) 


 





 


Alkaline Phosphatase


 57 U/L


() 


 57 U/L


() 





 


Total Protein


 3.7 g/dL


(6.4-8.2) 


 4.3 g/dL


(6.4-8.2) 





 


Albumin


 1.8 g/dL


(3.4-5.0) 


 1.5 g/dL


(3.4-5.0) 





 


Albumin/Globulin Ratio 0.9 (1.0-1.7)   0.5 (1.0-1.7)  


 


Glucose (Fingerstick)


 


 204 mg/dL


(70-99) 


 201 mg/dL


(70-99)


 


White Blood Count


 


 


 18.1 x10^3/uL


(4.0-11.0) 





 


Red Blood Count


 


 


 3.53 x10^6/uL


(3.50-5.40) 





 


Hemoglobin


 


 


 11.7 g/dL


(12.0-15.5) 





 


Hematocrit


 


 


 34.8 %


(36.0-47.0) 





 


Mean Corpuscular Volume   99 fL ()  


 


Mean Corpuscular Hemoglobin   33 pg (25-35)  


 


Mean Corpuscular Hemoglobin


Concent 


 


 34 g/dL


(31-37) 





 


Red Cell Distribution Width


 


 


 13.7 %


(11.5-14.5) 





 


Platelet Count


 


 


 162 x10^3/uL


(140-400) 





 


Neutrophils (%) (Auto)   88 % (31-73)  


 


Lymphocytes (%) (Auto)   7 % (24-48)  


 


Monocytes (%) (Auto)   5 % (0-9)  


 


Eosinophils (%) (Auto)   0 % (0-3)  


 


Basophils (%) (Auto)   0 % (0-3)  


 


Neutrophils # (Auto)


 


 


 16.0 x10^3/uL


(1.8-7.7) 





 


Lymphocytes # (Auto)


 


 


 1.2 x10^3/uL


(1.0-4.8) 





 


Monocytes # (Auto)


 


 


 0.8 x10^3/uL


(0.0-1.1) 





 


Eosinophils # (Auto)


 


 


 0.0 x10^3/uL


(0.0-0.7) 





 


Basophils # (Auto)


 


 


 0.0 x10^3/uL


(0.0-0.2) 





 


Test


 3/19/20


11:25 3/19/20


11:26 3/19/20


16:45 3/19/20


17:07


 


O2 Saturation 96 % (92-99)   97 % (92-99)  


 


Arterial Blood pH


 7.40


(7.35-7.45) 


 7.46


(7.35-7.45) 





 


Arterial Blood pCO2 at


Patient Temp 40 mmHg


(35-46) 


 33 mmHg


(35-46) 





 


Arterial Blood pO2 at Patient


Temp 84 mmHg


() 


 88 mmHg


() 





 


Arterial Blood HCO3


 25 mmol/L


(21-28) 


 23 mmol/L


(21-28) 





 


Arterial Blood Base Excess


 0 mmol/L


(-3-3) 


 0 mmol/L


(-3-3) 





 


FiO2 45   60  


 


Glucose (Fingerstick)


 


 176 mg/dL


(70-99) 


 237 mg/dL


(70-99)


 


Test


 3/20/20


00:21 3/20/20


06:00 3/20/20


06:04 3/20/20


06:45


 


Glucose (Fingerstick)


 259 mg/dL


(70-99) 


 218 mg/dL


(70-99) 





 


White Blood Count


 


 16.8 x10^3/uL


(4.0-11.0) 


 





 


Red Blood Count


 


 3.26 x10^6/uL


(3.50-5.40) 


 





 


Hemoglobin


 


 10.8 g/dL


(12.0-15.5) 


 





 


Hematocrit


 


 32.0 %


(36.0-47.0) 


 





 


Mean Corpuscular Volume  98 fL ()   


 


Mean Corpuscular Hemoglobin  33 pg (25-35)   


 


Mean Corpuscular Hemoglobin


Concent 


 34 g/dL


(31-37) 


 





 


Red Cell Distribution Width


 


 13.4 %


(11.5-14.5) 


 





 


Platelet Count


 


 141 x10^3/uL


(140-400) 


 





 


Neutrophils (%) (Auto)  91 % (31-73)   


 


Lymphocytes (%) (Auto)  5 % (24-48)   


 


Monocytes (%) (Auto)  4 % (0-9)   


 


Eosinophils (%) (Auto)  0 % (0-3)   


 


Basophils (%) (Auto)  0 % (0-3)   


 


Neutrophils # (Auto)


 


 15.3 x10^3/uL


(1.8-7.7) 


 





 


Lymphocytes # (Auto)


 


 0.8 x10^3/uL


(1.0-4.8) 


 





 


Monocytes # (Auto)


 


 0.7 x10^3/uL


(0.0-1.1) 


 





 


Eosinophils # (Auto)


 


 0.1 x10^3/uL


(0.0-0.7) 


 





 


Basophils # (Auto)


 


 0.0 x10^3/uL


(0.0-0.2) 


 





 


Sodium Level


 


 139 mmol/L


(136-145) 


 





 


Potassium Level


 


 2.4 mmol/L


(3.5-5.1) 


 





 


Chloride Level


 


 103 mmol/L


() 


 





 


Carbon Dioxide Level


 


 22 mmol/L


(21-32) 


 





 


Anion Gap  14 (6-14)   


 


Blood Urea Nitrogen


 


 41 mg/dL


(7-20) 


 





 


Creatinine


 


 5.0 mg/dL


(0.6-1.0) 


 





 


Estimated GFR


(Cockcroft-Gault) 


 9.2 


 


 





 


BUN/Creatinine Ratio  8 (6-20)   


 


Glucose Level


 


 219 mg/dL


(70-99) 


 





 


Calcium Level


 


 < 5.0 mg/dL


(8.5-10.1) 


 





 


Phosphorus Level


 


 2.5 mg/dL


(2.6-4.7) 


 





 


Magnesium Level


 


 1.9 mg/dL


(1.8-2.4) 


 





 


Total Bilirubin


 


 0.5 mg/dL


(0.2-1.0) 


 





 


Aspartate Amino Transf


(AST/SGOT) 


 56 U/L (15-37) 


 


 





 


Alanine Aminotransferase


(ALT/SGPT) 


 45 U/L (14-59) 


 


 





 


Alkaline Phosphatase


 


 55 U/L


() 


 





 


Total Protein


 


 3.8 g/dL


(6.4-8.2) 


 





 


Albumin


 


 1.0 g/dL


(3.4-5.0) 


 





 


Albumin/Globulin Ratio  0.4 (1.0-1.7)   


 


Triglycerides Level


 


 214 mg/dL


(0-150) 


 





 


Ionized Calcium


 


 


 


 < 0.25 mmol/L


(1.13-1.32)


 


Test


 3/20/20


08:00 


 


 





 


O2 Saturation 84 % (92-99)    


 


Arterial Blood pH


 7.36


(7.35-7.45) 


 


 





 


Arterial Blood pCO2 at


Patient Temp 43 mmHg


(35-46) 


 


 





 


Arterial Blood pO2 at Patient


Temp 51 mmHg


() 


 


 





 


Arterial Blood HCO3


 24 mmol/L


(21-28) 


 


 





 


Arterial Blood Base Excess


 -2 mmol/L


(-3-3) 


 


 





 


FiO2 Bipap 60%    








Laboratory Tests








Test


 3/19/20


11:25 3/19/20


11:26 3/19/20


16:45 3/19/20


17:07


 


O2 Saturation 96 % (92-99)   97 % (92-99)  


 


Arterial Blood pH


 7.40


(7.35-7.45) 


 7.46


(7.35-7.45) 





 


Arterial Blood pCO2 at


Patient Temp 40 mmHg


(35-46) 


 33 mmHg


(35-46) 





 


Arterial Blood pO2 at Patient


Temp 84 mmHg


() 


 88 mmHg


() 





 


Arterial Blood HCO3


 25 mmol/L


(21-28) 


 23 mmol/L


(21-28) 





 


Arterial Blood Base Excess


 0 mmol/L


(-3-3) 


 0 mmol/L


(-3-3) 





 


FiO2 45   60  


 


Glucose (Fingerstick)


 


 176 mg/dL


(70-99) 


 237 mg/dL


(70-99)


 


Test


 3/20/20


00:21 3/20/20


06:00 3/20/20


06:04 3/20/20


06:45


 


Glucose (Fingerstick)


 259 mg/dL


(70-99) 


 218 mg/dL


(70-99) 





 


White Blood Count


 


 16.8 x10^3/uL


(4.0-11.0) 


 





 


Red Blood Count


 


 3.26 x10^6/uL


(3.50-5.40) 


 





 


Hemoglobin


 


 10.8 g/dL


(12.0-15.5) 


 





 


Hematocrit


 


 32.0 %


(36.0-47.0) 


 





 


Mean Corpuscular Volume  98 fL ()   


 


Mean Corpuscular Hemoglobin  33 pg (25-35)   


 


Mean Corpuscular Hemoglobin


Concent 


 34 g/dL


(31-37) 


 





 


Red Cell Distribution Width


 


 13.4 %


(11.5-14.5) 


 





 


Platelet Count


 


 141 x10^3/uL


(140-400) 


 





 


Neutrophils (%) (Auto)  91 % (31-73)   


 


Lymphocytes (%) (Auto)  5 % (24-48)   


 


Monocytes (%) (Auto)  4 % (0-9)   


 


Eosinophils (%) (Auto)  0 % (0-3)   


 


Basophils (%) (Auto)  0 % (0-3)   


 


Neutrophils # (Auto)


 


 15.3 x10^3/uL


(1.8-7.7) 


 





 


Lymphocytes # (Auto)


 


 0.8 x10^3/uL


(1.0-4.8) 


 





 


Monocytes # (Auto)


 


 0.7 x10^3/uL


(0.0-1.1) 


 





 


Eosinophils # (Auto)


 


 0.1 x10^3/uL


(0.0-0.7) 


 





 


Basophils # (Auto)


 


 0.0 x10^3/uL


(0.0-0.2) 


 





 


Sodium Level


 


 139 mmol/L


(136-145) 


 





 


Potassium Level


 


 2.4 mmol/L


(3.5-5.1) 


 





 


Chloride Level


 


 103 mmol/L


() 


 





 


Carbon Dioxide Level


 


 22 mmol/L


(21-32) 


 





 


Anion Gap  14 (6-14)   


 


Blood Urea Nitrogen


 


 41 mg/dL


(7-20) 


 





 


Creatinine


 


 5.0 mg/dL


(0.6-1.0) 


 





 


Estimated GFR


(Cockcroft-Gault) 


 9.2 


 


 





 


BUN/Creatinine Ratio  8 (6-20)   


 


Glucose Level


 


 219 mg/dL


(70-99) 


 





 


Calcium Level


 


 < 5.0 mg/dL


(8.5-10.1) 


 





 


Phosphorus Level


 


 2.5 mg/dL


(2.6-4.7) 


 





 


Magnesium Level


 


 1.9 mg/dL


(1.8-2.4) 


 





 


Total Bilirubin


 


 0.5 mg/dL


(0.2-1.0) 


 





 


Aspartate Amino Transf


(AST/SGOT) 


 56 U/L (15-37) 


 


 





 


Alanine Aminotransferase


(ALT/SGPT) 


 45 U/L (14-59) 


 


 





 


Alkaline Phosphatase


 


 55 U/L


() 


 





 


Total Protein


 


 3.8 g/dL


(6.4-8.2) 


 





 


Albumin


 


 1.0 g/dL


(3.4-5.0) 


 





 


Albumin/Globulin Ratio  0.4 (1.0-1.7)   


 


Triglycerides Level


 


 214 mg/dL


(0-150) 


 





 


Ionized Calcium


 


 


 


 < 0.25 mmol/L


(1.13-1.32)


 


Test


 3/20/20


08:00 


 


 





 


O2 Saturation 84 % (92-99)    


 


Arterial Blood pH


 7.36


(7.35-7.45) 


 


 





 


Arterial Blood pCO2 at


Patient Temp 43 mmHg


(35-46) 


 


 





 


Arterial Blood pO2 at Patient


Temp 51 mmHg


() 


 


 





 


Arterial Blood HCO3


 24 mmol/L


(21-28) 


 


 





 


Arterial Blood Base Excess


 -2 mmol/L


(-3-3) 


 


 





 


FiO2 Bipap 60%    








Problem List


Problems


Medical Problems:


(1) Acute pancreatitis


Status: Acute  





(2) Cholelithiasis


Status: Acute  








Assessment/Plan


critical care


seen on dialysis 


will review with KIEL Lackey MD 3/20/20 1418:


SURGICAL PROGRESS NOTE


Assessment/Plan


pt seen and examined


sleepy, BIPAP on 


mother at bedside








continue supportive care


no new surgical recs


answered mom's questions











SAURAV NASH            Mar 20, 2020 10:56


KIEL BAL MD                Mar 20, 2020 14:18

## 2020-03-20 NOTE — RAD
CHEST AP ONLY

 

Clinical History: Pulmonary edema

 

Technique:  AP view of the chest was obtained at 3/20/2020 8:13 AM.

 

Comparison: March 18, 2020.

 

Findings:

 

The heart is normal size. The pulmonary vessels appear congested and there

is hazy opacity in the lung bases and obscuration of the diaphragm left 

worse than right. The right jugular line and right PICC are unchanged.

 

Impression:  

 

Moderate pleural effusions bilaterally and vascular congestion and 

pulmonary infiltrates likely secondary to CHF. No change.

 

Electronically signed by: Devyn Call III, MD (3/20/2020 8:37 AM) [id number]

## 2020-03-20 NOTE — NUR
pt given pain med of dilaudid 1 mg at 1630 and ativan 1mg at 1645.

pt sprayed with hurracaine spray orally and lidocaine jelly place in both nostrils and 
coated gastric tube with jelly. 

attempted to place ng tube via right nare. unsuccessful would not advance past nasal cavity. 


successful placement without difficulty via left nare. auscultated in luq. return of brown 
colored drainage. placed to low intermittent suction.

bright red drainage suctioned from back of mouth.

pt without bipap while placing tube. used nonrebreather to supplement. pt sats dropped to 
90%. bipap placed back on pt.

levophed continued for support.

## 2020-03-20 NOTE — PDOC
Objective:


Objective:


D/w nurse - hypotensive, c/o abd pain, concerned w/ hypocalcemia and hypok

alemia.


Looks like changed to PPI QD (from BID).


Vital Signs:





                                   Vital Signs








  Date Time  Temp Pulse Resp B/P (MAP) Pulse Ox O2 Delivery O2 Flow Rate FiO2


 


3/20/20 08:50      BiPAP/CPAP  


 


3/20/20 08:30   31  100   


 


3/20/20 07:01    102/44 (63)    


 


3/20/20 07:00 98.2 127      





 98.2       


 


3/20/20 04:03       6.0 








Labs:





Laboratory Tests








Test


 3/19/20


11:26 3/19/20


17:07 3/20/20


00:21 3/20/20


06:04


 


Glucose (Fingerstick)


 176 mg/dL


(70-99) 237 mg/dL


(70-99) 259 mg/dL


(70-99) 218 mg/dL


(70-99)








Imaging:


CXR 3/20


Impression:  


Moderate pleural effusions bilaterally and vascular congestion and pulmonary 

infiltrates likely secondary to CHF. No change.





PE:





GEN: ill - staff present for dialysis and to place art line


LUNGS: BiPAP


HEART: tachycardic on monitor





A/P:


Gallstone pancreatitis w/ necrosis and MOSF





--


Continue supportive care.





Hemodynamically unstable?:  Yes


Is patient in severe pain?:  Yes


Is NPO status required?:  Yes











RAGHAVENDRA MURCIA         Mar 20, 2020 09:43

## 2020-03-20 NOTE — NUR
dialysis being done at the bedside. criticals were called to dr reyes earlier. pt arterial 
line not working. removed. anethesia attempted on right wrist, unsuccessful by dr. conn.

call placed to dr herrera in regards to needing more iv sites and new arterial line.

levophed infusing to maintain sbp greater than 90.

spoke to boyfriend lexy on phone and updated on condition. boyfriend stated that pt 
requested him to make decisions for her, but nothing in in writing.

## 2020-03-20 NOTE — NUR
dialysis completed with 3 kilo's removed from pt. pt perineal area cleansed. pt taken off of 
bipap and teeth brushed and mouth cleansed and lubricated. pt not moving air well on her 
own, pt o2 sat desaturated to the low 80's. bipap placed back on pt and pt recovered in 5 
minutes. pt able to communicate that she is having abdominal pain. She was able to rate it a 
"8" on the scale of 1 to 10 with 10 severe. pt given iv dilaudid.

discussed with dr garcia about pt coughed up green bile while doing oral care. pt to receive 
ng tube today with lis.

pt moved to room 114 to be isolated on unit. pt tolerated well.

## 2020-03-20 NOTE — PDOC
[facility] PROGRESS NOTE


Chief Complaint


Chief Complaint


Severe Acute pancreatitis


Acute kidney failure now requiring dialysis


Salpingo--itis


Gallstones (Calculus of gallbladder with acute cholecystitis without 

obstruction)


HTN 


Leukocytosis 


Hypoxia


Uterine fibroid


Hypoxia with respiratory failure


Intractable pain


Intractable nausea





History of Present Illness


History of Present Illness


[id number]


Patient seen and examined in the ICU


She is on BiPAP


Her mother and another family member are present and seemed to be good support 

for her


Currently on dialysis


Appears critically ill


Discussed with RN


Reviewed chart


Discussed case at length with her mother








[id number]


Patient seen and examined in the ICU


She is now on BiPAP appears more ill


Discussed with RN


Chart reviewed


She is now on dialysis








[id number]


Patient seen and examined in the ICU


She appears extremely ill critically ill


Discussed with RN


Getting a chest x-ray now


Her sats are only 87% on nasal cannula oxygen


Chart reviewed





Vitals/I&O


Vitals/I&O:





                                   Vital Signs








  Date Time  Temp Pulse Resp B/P (MAP) Pulse Ox O2 Delivery O2 Flow Rate FiO2


 


3/20/20 10:00  115 25 104/71 (82) 100 BiPAP/CPAP  


 


3/20/20 07:00 98.2       





 98.2       


 


3/20/20 04:03       6.0 














                                    I & O   


 


 3/19/20 3/19/20 3/20/20





 15:00 23:00 07:00


 


Intake Total 50 ml 3530.8 ml 3857.3 ml


 


Output Total 20 ml 1000 ml 25 ml


 


Balance 30 ml 2530.8 ml 3832.3 ml











Physical Exam


Physical Exam:


GENERAL: pt on bipap, Lethargic, alert, awake, ill-appearing female in ICU bed, 

cooperative. On hemodialysis now


HEENT:  Normocephalic, atraumatic.  Mild icterus.  No thrush.  Oral mucosa dry.


NECK:  Supple. Temp HDC cath in place


LUNGS:  Decreased breath sounds at the bases.  No wheezing.


HEART:  S1, S2, tachycardia.  No murmurs.


ABDOMEN:  Soft, mildly distended.  Mild tenderness on deep palpation upper


abdomen.  No rebound, no guarding.


EXTREMITIES: + edema, no cyanosis.


DERMATOLOGIC:  Warm and dry.  No generalized rash.  PICC line in right upper


extremity looks okay.


CENTRAL NERVOUS SYSTEM: Extremely lethargic


General:  Other (ill appearing )


Heart:  Other (increased rate)


Lungs:  Clear


Abdomen:  Soft, Other (mildly distended , moans when epigastric area palpated )


Extremities:  No edema, Other (SOME CLUBBING )


Skin:  No rashes, No breakdown, No significant lesion





Labs


Labs:





Laboratory Tests








Test


 3/19/20


16:45 3/19/20


17:07 3/20/20


00:21 3/20/20


06:00


 


O2 Saturation 97 % (92-99)    


 


Arterial Blood pH


 7.46


(7.35-7.45) 


 


 





 


Arterial Blood pCO2 at


Patient Temp 33 mmHg


(35-46) 


 


 





 


Arterial Blood pO2 at Patient


Temp 88 mmHg


() 


 


 





 


Arterial Blood HCO3


 23 mmol/L


(21-28) 


 


 





 


Arterial Blood Base Excess


 0 mmol/L


(-3-3) 


 


 





 


FiO2 60    


 


Glucose (Fingerstick)


 


 237 mg/dL


(70-99) 259 mg/dL


(70-99) 





 


White Blood Count


 


 


 


 16.8 x10^3/uL


(4.0-11.0)


 


Red Blood Count


 


 


 


 3.26 x10^6/uL


(3.50-5.40)


 


Hemoglobin


 


 


 


 10.8 g/dL


(12.0-15.5)


 


Hematocrit


 


 


 


 32.0 %


(36.0-47.0)


 


Mean Corpuscular Volume    98 fL () 


 


Mean Corpuscular Hemoglobin    33 pg (25-35) 


 


Mean Corpuscular Hemoglobin


Concent 


 


 


 34 g/dL


(31-37)


 


Red Cell Distribution Width


 


 


 


 13.4 %


(11.5-14.5)


 


Platelet Count


 


 


 


 141 x10^3/uL


(140-400)


 


Neutrophils (%) (Auto)    91 % (31-73) 


 


Lymphocytes (%) (Auto)    5 % (24-48) 


 


Monocytes (%) (Auto)    4 % (0-9) 


 


Eosinophils (%) (Auto)    0 % (0-3) 


 


Basophils (%) (Auto)    0 % (0-3) 


 


Neutrophils # (Auto)


 


 


 


 15.3 x10^3/uL


(1.8-7.7)


 


Lymphocytes # (Auto)


 


 


 


 0.8 x10^3/uL


(1.0-4.8)


 


Monocytes # (Auto)


 


 


 


 0.7 x10^3/uL


(0.0-1.1)


 


Eosinophils # (Auto)


 


 


 


 0.1 x10^3/uL


(0.0-0.7)


 


Basophils # (Auto)


 


 


 


 0.0 x10^3/uL


(0.0-0.2)


 


Sodium Level


 


 


 


 139 mmol/L


(136-145)


 


Potassium Level


 


 


 


 2.4 mmol/L


(3.5-5.1)


 


Chloride Level


 


 


 


 103 mmol/L


()


 


Carbon Dioxide Level


 


 


 


 22 mmol/L


(21-32)


 


Anion Gap    14 (6-14) 


 


Blood Urea Nitrogen


 


 


 


 41 mg/dL


(7-20)


 


Creatinine


 


 


 


 5.0 mg/dL


(0.6-1.0)


 


Estimated GFR


(Cockcroft-Gault) 


 


 


 9.2 





 


BUN/Creatinine Ratio    8 (6-20) 


 


Glucose Level


 


 


 


 219 mg/dL


(70-99)


 


Calcium Level


 


 


 


 < 5.0 mg/dL


(8.5-10.1)


 


Phosphorus Level


 


 


 


 2.5 mg/dL


(2.6-4.7)


 


Magnesium Level


 


 


 


 1.9 mg/dL


(1.8-2.4)


 


Total Bilirubin


 


 


 


 0.5 mg/dL


(0.2-1.0)


 


Aspartate Amino Transf


(AST/SGOT) 


 


 


 56 U/L (15-37) 





 


Alanine Aminotransferase


(ALT/SGPT) 


 


 


 45 U/L (14-59) 





 


Alkaline Phosphatase


 


 


 


 55 U/L


()


 


Total Protein


 


 


 


 3.8 g/dL


(6.4-8.2)


 


Albumin


 


 


 


 1.0 g/dL


(3.4-5.0)


 


Albumin/Globulin Ratio    0.4 (1.0-1.7) 


 


Triglycerides Level


 


 


 


 214 mg/dL


(0-150)


 


Test


 3/20/20


06:04 3/20/20


06:45 3/20/20


08:00 3/20/20


11:30


 


Glucose (Fingerstick)


 218 mg/dL


(70-99) 


 


 





 


Ionized Calcium


 


 < 0.25 mmol/L


(1.13-1.32) 


 





 


O2 Saturation   84 % (92-99)  


 


Arterial Blood pH


 


 


 7.36


(7.35-7.45) 





 


Arterial Blood pCO2 at


Patient Temp 


 


 43 mmHg


(35-46) 





 


Arterial Blood pO2 at Patient


Temp 


 


 51 mmHg


() 





 


Arterial Blood HCO3


 


 


 24 mmol/L


(21-28) 





 


Arterial Blood Base Excess


 


 


 -2 mmol/L


(-3-3) 





 


FiO2   Bipap 60%  


 


Potassium Level


 


 


 


 3.4 mmol/L


(3.5-5.1)











Review of Systems


Review of Systems:


Unable to obtain





Assessment and Plan


Assessmemt and Plan


Problems


Medical Problems:


(1) Acute pancreatitis


Status: Acute  





(2) Cholelithiasis


Status: Acute  





Severe Acute pancreatitis


Acute kidney failure now requiring dialysis


Salpingo--itis


Gallstones (Calculus of gallbladder with acute cholecystitis without 

obstruction)


HTN 


Leukocytosis 


Hypoxia


Uterine fibroid


Hypoxia with respiratory failure


Intractable pain


Intractable nausea











Plan


Hemodialysis


BiPAP


ICU monitoring


Trend labs especially white count and lipase levels


We have consulted GI and general surgery


I consulted OB/GYN as well


IV antibiotics


IV fluids


CT narcotics


When necessary anti-medics


Home meds if possible


DVT prophylaxis


Full code


She is critically ill


Total time 36 minutes





Comment


Review of Relevant


I have reviewed the following items josy (where applicable) has been applied.


Medications:





Current Medications








 Medications


  (Trade)  Dose


 Ordered  Sig/Yee


 Route


 PRN Reason  Start Time


 Stop Time Status Last Admin


Dose Admin


 


 Digoxin


  (Lanoxin)  125 mcg  1X  ONCE


 IV


   3/19/20 18:00


 3/19/20 18:01 DC 3/19/20 17:10





 


 Magnesium Sulfate  100 ml @ 


 25 mls/hr  1X  ONCE


 IV


   3/19/20 13:00


 3/19/20 16:59 DC 3/19/20 12:48





 


 Sodium Chloride


 90 meq/Magnesium


 Sulfate 10 meq/


 Calcium Gluconate


 20 meq/


 Multivitamins 10


 ml/Chromium/


 Copper/Manganese/


 Seleni/Zn 0.5 ml/


 Total Parenteral


 Nutrition/Amino


 Acids/Dextrose/


 Fat Emulsion


 Intravenous  1,512 ml @ 


 63 mls/hr  TPN  CONT


 IV


   3/19/20 22:00


 3/20/20 21:59  3/19/20 22:25





 


 Albumin Human  200 ml @ 


 200 mls/hr  1X  ONCE


 IV


   3/20/20 08:15


 3/20/20 09:14 DC 3/20/20 08:57














Hemodynamically unstable?:  Yes


Is patient in severe pain?:  Yes


Is NPO status required?:  Yes











HECTOR MASON III DO           Mar 20, 2020 12:21

## 2020-03-20 NOTE — NUR
pts eldest daughter petra called and said she will remain the main decision maker for her 
mother with Beth Tadeo, the second child, as the secondary decision maker. Notified 
Delilah of her decision.

## 2020-03-20 NOTE — PDOC
Renal-Progress Notes


Subjective Notes


Notes


ON BIPAP IN SOME DISTRESS





History of Present Illness


Hx of present illness


STABLE





Vitals


Vitals





Vital Signs








  Date Time  Temp Pulse Resp B/P (MAP) Pulse Ox O2 Delivery O2 Flow Rate FiO2


 


3/20/20 10:00  115 25 104/71 (82) 100 BiPAP/CPAP  


 


3/20/20 07:00 98.2       





 98.2       


 


3/20/20 04:03       6.0 








Weight


Weight [ ]





I.O.


Intake and Output











Intake and Output 


 


 3/20/20





 07:00


 


Intake Total 7438.1 ml


 


Output Total 1045 ml


 


Balance 6393.1 ml


 


 


 


Intake Oral 0 ml


 


IV Total 4000.1 ml


 


Other 3438 ml


 


Output Urine Total 45 ml


 


Stool Total 1000 ml











Labs


Labs





Laboratory Tests








Test


 3/19/20


16:45 3/19/20


17:07 3/20/20


00:21 3/20/20


06:00


 


O2 Saturation 97 % (92-99)    


 


Arterial Blood pH


 7.46


(7.35-7.45) 


 


 





 


Arterial Blood pCO2 at


Patient Temp 33 mmHg


(35-46) 


 


 





 


Arterial Blood pO2 at Patient


Temp 88 mmHg


() 


 


 





 


Arterial Blood HCO3


 23 mmol/L


(21-28) 


 


 





 


Arterial Blood Base Excess


 0 mmol/L


(-3-3) 


 


 





 


FiO2 60    


 


Glucose (Fingerstick)


 


 237 mg/dL


(70-99) 259 mg/dL


(70-99) 





 


White Blood Count


 


 


 


 16.8 x10^3/uL


(4.0-11.0)


 


Red Blood Count


 


 


 


 3.26 x10^6/uL


(3.50-5.40)


 


Hemoglobin


 


 


 


 10.8 g/dL


(12.0-15.5)


 


Hematocrit


 


 


 


 32.0 %


(36.0-47.0)


 


Mean Corpuscular Volume    98 fL () 


 


Mean Corpuscular Hemoglobin    33 pg (25-35) 


 


Mean Corpuscular Hemoglobin


Concent 


 


 


 34 g/dL


(31-37)


 


Red Cell Distribution Width


 


 


 


 13.4 %


(11.5-14.5)


 


Platelet Count


 


 


 


 141 x10^3/uL


(140-400)


 


Neutrophils (%) (Auto)    91 % (31-73) 


 


Lymphocytes (%) (Auto)    5 % (24-48) 


 


Monocytes (%) (Auto)    4 % (0-9) 


 


Eosinophils (%) (Auto)    0 % (0-3) 


 


Basophils (%) (Auto)    0 % (0-3) 


 


Neutrophils # (Auto)


 


 


 


 15.3 x10^3/uL


(1.8-7.7)


 


Lymphocytes # (Auto)


 


 


 


 0.8 x10^3/uL


(1.0-4.8)


 


Monocytes # (Auto)


 


 


 


 0.7 x10^3/uL


(0.0-1.1)


 


Eosinophils # (Auto)


 


 


 


 0.1 x10^3/uL


(0.0-0.7)


 


Basophils # (Auto)


 


 


 


 0.0 x10^3/uL


(0.0-0.2)


 


Sodium Level


 


 


 


 139 mmol/L


(136-145)


 


Potassium Level


 


 


 


 2.4 mmol/L


(3.5-5.1)


 


Chloride Level


 


 


 


 103 mmol/L


()


 


Carbon Dioxide Level


 


 


 


 22 mmol/L


(21-32)


 


Anion Gap    14 (6-14) 


 


Blood Urea Nitrogen


 


 


 


 41 mg/dL


(7-20)


 


Creatinine


 


 


 


 5.0 mg/dL


(0.6-1.0)


 


Estimated GFR


(Cockcroft-Gault) 


 


 


 9.2 





 


BUN/Creatinine Ratio    8 (6-20) 


 


Glucose Level


 


 


 


 219 mg/dL


(70-99)


 


Calcium Level


 


 


 


 < 5.0 mg/dL


(8.5-10.1)


 


Phosphorus Level


 


 


 


 2.5 mg/dL


(2.6-4.7)


 


Magnesium Level


 


 


 


 1.9 mg/dL


(1.8-2.4)


 


Total Bilirubin


 


 


 


 0.5 mg/dL


(0.2-1.0)


 


Aspartate Amino Transf


(AST/SGOT) 


 


 


 56 U/L (15-37) 





 


Alanine Aminotransferase


(ALT/SGPT) 


 


 


 45 U/L (14-59) 





 


Alkaline Phosphatase


 


 


 


 55 U/L


()


 


Total Protein


 


 


 


 3.8 g/dL


(6.4-8.2)


 


Albumin


 


 


 


 1.0 g/dL


(3.4-5.0)


 


Albumin/Globulin Ratio    0.4 (1.0-1.7) 


 


Triglycerides Level


 


 


 


 214 mg/dL


(0-150)


 


Test


 3/20/20


06:04 3/20/20


06:45 3/20/20


08:00 





 


Glucose (Fingerstick)


 218 mg/dL


(70-99) 


 


 





 


Ionized Calcium


 


 < 0.25 mmol/L


(1.13-1.32) 


 





 


O2 Saturation   84 % (92-99)  


 


Arterial Blood pH


 


 


 7.36


(7.35-7.45) 





 


Arterial Blood pCO2 at


Patient Temp 


 


 43 mmHg


(35-46) 





 


Arterial Blood pO2 at Patient


Temp 


 


 51 mmHg


() 





 


Arterial Blood HCO3


 


 


 24 mmol/L


(21-28) 





 


Arterial Blood Base Excess


 


 


 -2 mmol/L


(-3-3) 





 


FiO2   Bipap 60%  











Micro


Micro





Microbiology


3/18/20 Blood Culture - Preliminary, Resulted


          NO GROWTH AFTER 2 DAYS





Review of Systems


Constitutional:  yes: other (DIFFICULT TO OBTAIN)





Physical Exam


General Appearance:  moderate distress


Skin:  warm


Respiratory:  decreased breath sounds


Heart:  S1S2


Abdomen:  soft, bowel sounds present


Genitourinary:  bladder flat


Extremities:  pulses present


Neurology:  alert





Assessment


Assessment


IMP





[id number]


HYPERKALEMIA-BETTER


ACIDOSIS AND ACIDEMIA


ACUTE REPS FAILURE


ACUTE PANCREATITIS


HYPOALBUMINEMIA


HYPOCALCEMIA





PLAN





TPN


CALCIUM REPLACEMENT


HD TO CORRECT ACID BASE AND LYTES


WILL USE HIGH HCO3 DIALYSATE


CONT HCO3 GTT


ON BIPAP


WILL PROB NEED VENT SUPPORT


WILL FOLLOW











BETH HEIN MD                 Mar 20, 2020 11:35

## 2020-03-20 NOTE — PDOC
Infectious Disease Note


Subjective:


Subjective


pt cont to remain on bipap


c/o abdo pain but under control with pain meds


 


d/w rn





Vital Signs:


Vital Signs





Vital Signs








  Date Time  Temp Pulse Resp B/P (MAP) Pulse Ox O2 Delivery O2 Flow Rate FiO2


 


3/20/20 06:00  123 24 124/48 (73) 100 BiPAP/CPAP  


 


3/20/20 04:03       6.0 


 


3/20/20 04:00 98.4       





 98.4       











Physical Exam:


PHYSICAL EXAM


GENERAL: pt on bipap, Lethargic, alert, awake, ill-appearing female in ICU bed, 

cooperative.


HEENT:  Normocephalic, atraumatic.  Mild icterus.  No thrush.  Oral mucosa dry.


NECK:  Supple. Temp HDC cath in place


LUNGS:  Decreased breath sounds at the bases.  No wheezing.


HEART:  S1, S2, tachycardia.  No murmurs.


ABDOMEN:  Soft, mildly distended.  Mild tenderness on deep palpation upper


abdomen.  No rebound, no guarding.


EXTREMITIES: + edema, no cyanosis.


DERMATOLOGIC:  Warm and dry.  No generalized rash.  PICC line in right upper


extremity looks okay.


CENTRAL NERVOUS SYSTEM:  Alert and oriented x 3, grossly nonfocal.


PSYCHIATRIC:  Cooperative.





Medications:


Inpatient Meds:





Current Medications








 Medications


  (Trade)  Dose


 Ordered  Sig/Yee  Start Time


 Stop Time Status Last Admin


Dose Admin


 


 Albumin Human  200 ml @ 


 200 mls/hr  1X PRN  PRN  3/19/20 09:00


 3/19/20 14:59 DC  





 


 Albuterol Sulfate


  (Ventolin Neb


 Soln)  2.5 mg  1X  ONCE  3/17/20 22:30


 3/17/20 22:31 DC 3/18/20 00:56


2.5 MG


 


 Atenolol


  (Tenormin)  100 mg  DAILY  3/17/20 09:00


 3/16/20 20:08 DC  





 


 Calcium Carbonate/


 Glycine


  (Tums)  500 mg  PRN AFTMEALHC  PRN  3/18/20 17:45


     





 


 Calcium Chloride


 1000 mg/Sodium


 Chloride  110 ml @ 


 220 mls/hr  1X  ONCE  3/17/20 22:30


 3/17/20 22:59 DC 3/17/20 22:11


220 MLS/HR


 


 Calcium Chloride


 3000 mg/Sodium


 Chloride  1,030 ml @ 


 50 mls/hr  [id number]  3/19/20 08:00


    3/20/20 04:26


50 MLS/HR


 


 Calcium Gluconate


  (Calcium


 Gluconate)  2,000 mg  1X  ONCE  3/19/20 02:15


 3/19/20 02:16 DC 3/19/20 02:19


2,000 MG


 


 Calcium Gluconate


 1000 mg/Sodium


 Chloride  110 ml @ 


 220 mls/hr  1X  ONCE  3/18/20 03:30


 3/18/20 03:59 DC 3/18/20 03:21


220 MLS/HR


 


 Calcium Gluconate


 2000 mg/Sodium


 Chloride  120 ml @ 


 220 mls/hr  1X  ONCE  3/18/20 07:30


 3/18/20 08:02 DC 3/18/20 09:05


220 MLS/HR


 


 Dextrose


  (Dextrose


 50%-Water Syringe)  12.5 gm  PRN Q15MIN  PRN  3/16/20 09:30


     





 


 Digoxin


  (Lanoxin)  125 mcg  1X  ONCE  3/19/20 18:00


 3/19/20 18:01 DC 3/19/20 17:10


125 MCG


 


 Diphenhydramine


 HCl


  (Benadryl)  25 mg  1X PRN  PRN  3/19/20 09:00


 3/20/20 08:59   





 


 Fentanyl Citrate


  (Fentanyl 2ml


 Vial)  100 mcg  STK-MED ONCE  3/16/20 03:18


 3/16/20 03:18 DC  





 


 Furosemide


  (Lasix)  40 mg  1X  ONCE  3/17/20 22:30


 3/17/20 22:31 DC 3/17/20 22:12


40 MG


 


 Hydromorphone HCl


  (Dilaudid)  1 mg  PRN Q3HRS  PRN  3/17/20 12:00


    3/20/20 03:33


1 MG


 


 Info


  (CONTRAST GIVEN


 -- Rx MONITORING)  1 each  PRN DAILY  PRN  3/17/20 17:00


 3/19/20 16:59 DC  





 


 Info


  (PHARMACY


 MONITORING -- do


 not chart)  1 each  PRN DAILY  PRN  3/19/20 09:00


     





 


 Info


  (Tpn Per


 Pharmacy)  1 each  PRN DAILY  PRN  3/18/20 12:30


   UNV  





 


 Insulin Human


 Lispro


  (HumaLOG)  0-9 UNITS  Q6HRS  3/16/20 09:30


    3/20/20 07:22


4 UNITS


 


 Insulin Human


 Regular


  (HumuLIN R VIAL)  5 unit  1X  ONCE  3/17/20 22:30


 3/17/20 22:31 DC 3/17/20 22:14


5 UNIT


 


 Iohexol


  (Omnipaque 300


 Mg/ml)  60 ml  1X  ONCE  3/17/20 17:00


 3/17/20 17:01 DC 3/17/20 17:20


60 ML


 


 Iohexol


  (Omnipaque 350


 Mg/ml)  90 ml  1X  ONCE  3/16/20 03:30


 3/16/20 03:31 DC 3/16/20 03:25


90 ML


 


 Ketorolac


 Tromethamine


  (Toradol 30mg


 Vial)  30 mg  1X  ONCE  3/16/20 03:00


 3/16/20 03:01 DC 3/16/20 02:54


30 MG


 


 Lidocaine HCl


  (Buffered


 Lidocaine 1%)  6 ml  1X  ONCE  3/18/20 10:15


 3/18/20 10:16 DC 3/18/20 10:26


4 ML


 


 Lidocaine HCl


  (Xylocaine-Mpf


 1% 2ml Vial)  2 ml  STK-MED ONCE  3/18/20 08:47


 3/18/20 08:47 DC  





 


 Lorazepam


  (Ativan Inj)  1 mg  PRN Q4HRS  PRN  3/19/20 09:00


    3/20/20 04:26


1 MG


 


 Magnesium Sulfate  100 ml @ 


 25 mls/hr  1X  ONCE  3/19/20 13:00


 3/19/20 16:59 DC 3/19/20 12:48


25 MLS/HR


 


 Meropenem 1 gm/


 Sodium Chloride  100 ml @ 


 200 mls/hr  Q8HRS  3/17/20 20:00


 3/18/20 08:48 DC 3/18/20 05:45


200 MLS/HR


 


 Meropenem 500 mg/


 Sodium Chloride  50 ml @ 


 100 mls/hr  Q12HR  3/18/20 18:00


    3/19/20 22:25


100 MLS/HR


 


 Metoprolol


 Tartrate


  (Lopressor Vial)  5 mg  Q6HRS  3/17/20 10:15


    3/20/20 05:52


5 MG


 


 Morphine Sulfate


  (Morphine


 Sulfate)  2 mg  PRN Q2HR  PRN  3/16/20 05:00


 3/17/20 14:15 DC 3/17/20 12:26


2 MG


 


 Norepinephrine


 Bitartrate 8 mg/


 Dextrose  258 ml @ 


 17.299 mls/


 hr  CONT  PRN  3/17/20 15:30


    3/18/20 11:34


13.44 MLS/HR


 


 Ondansetron HCl


  (Zofran)  4 mg  PRN Q4HRS  PRN  3/16/20 09:30


    3/18/20 23:56


4 MG


 


 Pantoprazole


 Sodium


  (PROTONIX VIAL


 for IV PUSH)  40 mg  DAILYAC  3/16/20 11:30


    3/19/20 09:27


40 MG


 


 Piperacillin Sod/


 Tazobactam Sod


 4.5 gm/Sodium


 Chloride  100 ml @ 


 200 mls/hr  1X  ONCE  3/16/20 06:00


 3/16/20 06:29 DC 3/16/20 05:44


200 MLS/HR


 


 Prochlorperazine


 Edisylate


  (Compazine)  10 mg  PRN Q6HRS  PRN  3/16/20 17:45


    3/17/20 00:42


10 MG


 


 Sodium


 Bicarbonate 50


 meq/Sodium


 Chloride  1,050 ml @ 


 200 mls/hr  Q5H15M  3/18/20 07:30


    3/20/20 07:19


200 MLS/HR


 


 Sodium Chloride


 90 meq/Calcium


 Gluconate 10 meq/


 Multivitamins 10


 ml/Chromium/


 Copper/Manganese/


 Seleni/Zn 0.5 ml/


 Total Parenteral


 Nutrition/Amino


 Acids/Dextrose/


 Fat Emulsion


 Intravenous  1,512 ml @ 


 63 mls/hr  TPN  CONT  3/18/20 22:00


 3/19/20 21:59 DC 3/18/20 22:06


63 MLS/HR


 


 Sodium Chloride


 90 meq/Calcium


 Gluconate 10 meq/


 Multivitamins 10


 ml/Chromium/


 Copper/Manganese/


 Seleni/Zn 1 ml/


 Total Parenteral


 Nutrition/Amino


 Acids/Dextrose/


 Fat Emulsion


 Intravenous  55.005 ml


  @ 2.292


 mls/hr  TPN  CONT  3/18/20 22:00


 3/18/20 12:33 DC  





 


 Sodium Chloride


 90 meq/Magnesium


 Sulfate 10 meq/


 Calcium Gluconate


 20 meq/


 Multivitamins 10


 ml/Chromium/


 Copper/Manganese/


 Seleni/Zn 0.5 ml/


 Total Parenteral


 Nutrition/Amino


 Acids/Dextrose/


 Fat Emulsion


 Intravenous  1,512 ml @ 


 63 mls/hr  TPN  CONT  3/19/20 22:00


 3/20/20 21:59  3/19/20 22:25


63 MLS/HR











Labs:


Lab





Laboratory Tests








Test


 3/19/20


11:25 3/19/20


11:26 3/19/20


16:45 3/19/20


17:07


 


O2 Saturation 96 % (92-99)   97 % (92-99)  


 


Arterial Blood pH


 7.40


(7.35-7.45) 


 7.46


(7.35-7.45) 





 


Arterial Blood pCO2 at


Patient Temp 40 mmHg


(35-46) 


 33 mmHg


(35-46) 





 


Arterial Blood pO2 at Patient


Temp 84 mmHg


() 


 88 mmHg


() 





 


Arterial Blood HCO3


 25 mmol/L


(21-28) 


 23 mmol/L


(21-28) 





 


Arterial Blood Base Excess


 0 mmol/L


(-3-3) 


 0 mmol/L


(-3-3) 





 


FiO2 45   60  


 


Glucose (Fingerstick)


 


 176 mg/dL


(70-99) 


 237 mg/dL


(70-99)


 


Test


 3/20/20


00:21 3/20/20


06:00 3/20/20


06:04 





 


Glucose (Fingerstick)


 259 mg/dL


(70-99) 


 218 mg/dL


(70-99) 





 


White Blood Count


 


 16.8 x10^3/uL


(4.0-11.0) 


 





 


Red Blood Count


 


 3.26 x10^6/uL


(3.50-5.40) 


 





 


Hemoglobin


 


 10.8 g/dL


(12.0-15.5) 


 





 


Hematocrit


 


 32.0 %


(36.0-47.0) 


 





 


Mean Corpuscular Volume  98 fL ()   


 


Mean Corpuscular Hemoglobin  33 pg (25-35)   


 


Mean Corpuscular Hemoglobin


Concent 


 34 g/dL


(31-37) 


 





 


Red Cell Distribution Width


 


 13.4 %


(11.5-14.5) 


 





 


Platelet Count


 


 141 x10^3/uL


(140-400) 


 





 


Neutrophils (%) (Auto)  91 % (31-73)   


 


Lymphocytes (%) (Auto)  5 % (24-48)   


 


Monocytes (%) (Auto)  4 % (0-9)   


 


Eosinophils (%) (Auto)  0 % (0-3)   


 


Basophils (%) (Auto)  0 % (0-3)   


 


Neutrophils # (Auto)


 


 15.3 x10^3/uL


(1.8-7.7) 


 





 


Lymphocytes # (Auto)


 


 0.8 x10^3/uL


(1.0-4.8) 


 





 


Monocytes # (Auto)


 


 0.7 x10^3/uL


(0.0-1.1) 


 





 


Eosinophils # (Auto)


 


 0.1 x10^3/uL


(0.0-0.7) 


 





 


Basophils # (Auto)


 


 0.0 x10^3/uL


(0.0-0.2) 


 














Objective:


Assessment:


 


Acute pancreatitis, early developing necrosis


Cholelithiasis


Leucocytosis


JED,Hyperkalemia, Metabolic acidosis on HD


Acute hypoxic resp failure ,bilateral pleural effusion


hypocalcemia 


Prediabetes


HTN





Plan:


Plan of Care


cont merrem ,renal dosing,


f/u labs and cults


cont supportive care


d/w IVAN MENDOZA MD           Mar 20, 2020 07:34

## 2020-03-20 NOTE — RAD
AP chest.

 

HISTORY: Line placement

 

AP view was taken of the chest. There is a left central line which extends

to the superior vena cave or just into the atrium. EKG leads partially 

obscure visualization. Dialysis catheter is unchanged. Right PICC line is 

unchanged. There are bilateral pleural effusions. There is atelectasis or 

infiltrate in both lung bases. A left pneumothorax is not identified.

 

IMPRESSION:

1. Left central line extends to the superior vena cava or just into the 

atrium.

2. Dialysis catheter and right PICC line are unchanged.

3. Little other change.

4. No pneumothorax.

 

Electronically signed by: Sourav Frias MD (3/20/2020 12:04 PM) [id number]

## 2020-03-20 NOTE — NUR
Pharmacy TPN Dosing Note



S: JESENIA RICH is a 49 year old F Currently receiving Central Continuous TPN started 
03/18/20



B:Pertinent PMH: 

Necrotizing pancreatitis

Height: 5 feet, 8 inches

Weight: 95.2 kg



Current diet: NPO 



LABS:

Sodium:    139 

Potassium: 2.4/3.4 

Chloride:  103 

Calcium:   5.0 

Corrected Calcium: 7.00 

Magnesium: 1.9 

CO2:       22 

SCr:       5.0 

Glucose:   219 

Albumin:   1.5 

AST:       56 

ALT:       45 



TPN FORMULA:

TPN TYPE:  Central Continuous

AMINO ACIDS:         60 gm

DEXTROSE:            195 gm

LIPIDS:              20 gm

SODIUM CHLORIDE:     90 mEq

SODIUM ACETATE:      -- mEq

SODIUM PHOSPHATE:    -- mmol

POTASSIUM CHLORIDE:  15 mEq

POTASSIUM ACETATE:   -- mEq

POTASSIUM PHOSPHATE: 10 mmol

MAGNESIUM:           10 mEq

CALCIUM:             20 mEq

INSULIN:             -- units

MULTIPLE VITAMIN:    10 ml

TRACE ELEMENTS:      0.5 ml(s)



TPN PLAN:  

Serum calcium still not detectable, calcicum gluc drip for delivery of 3g

in 20 hrs.

K 2.4 this am, 3.4 after HD, will add 15meq KCl as well as 10mmol Kphos

due to low phos; may need to correct in the am

Mag has corrected

BMP, mag, phos tomorrow.





R: Continue TPN as written above.

Will monitor electrolytes, glucose, and tolerance to TPN.



 PRESTON MCCULLOUGH R[facility], 03/20/20 [id number]

## 2020-03-20 NOTE — PDOC
PULMONARY PROGRESS NOTES


Subjective


SLEEPY BUT AROUSABLE


ON BIPAP HR IS BETTER


Vitals





Vital Signs








  Date Time  Temp Pulse Resp B/P (MAP) Pulse Ox O2 Delivery O2 Flow Rate FiO2


 


3/20/20 15:00  128 17 105/55 (72) 100 BiPAP/CPAP  


 


3/20/20 14:00 100.3       





 100.3       


 


3/20/20 04:03       6.0 








ROS:  No Nausea, No Chest Pain, No Increase Cough


General:  Alert


Lungs:  Clear


Cardiovascular:  S1, S2


Abdomen:  Other (DISTENDED AND DIFFUSE PAIN)


Neuro Exam:  Alert


Extremities:  No Edema


Skin:  Warm


Labs





Laboratory Tests








Test


 3/18/20


17:20 3/18/20


17:26 3/19/20


00:23 3/19/20


00:55


 


Ionized Calcium


 0.64 mmol/L


(1.13-1.32) 


 


 





 


Hepatitis B Surface Antibody,


Quant <3.1 mIU/mL


(Immunity>9.9) 


 


 





 


Glucose (Fingerstick)


 


 99 mg/dL


(70-99) 


 





 


O2 Saturation   95 % (92-99)  


 


Arterial Blood pH


 


 


 7.32


(7.35-7.45) 





 


Arterial Blood pH (Temp


corrected) 


 


 7.30 


 





 


Arterial Blood pCO2 at


Patient Temp 


 


 40 mmHg


(35-46) 





 


Arterial Blood pCO2 (Temp


correct) 


 


 42 mmHg 


 





 


Arterial Blood pO2 at Patient


Temp 


 


 79 mmHg


() 





 


Arterial Blood pO2 (Temp


corrected) 


 


 85 mmHg 


 





 


Arterial Blood HCO3


 


 


 20 mmol/L


(21-28) 





 


Arterial Blood Base Excess


 


 


 -6 mmol/L


(-3-3) 





 


FiO2   60  


 


Sodium Level


 


 


 


 140 mmol/L


(136-145)


 


Potassium Level


 


 


 


 3.8 mmol/L


(3.5-5.1)


 


Chloride Level


 


 


 


 104 mmol/L


()


 


Carbon Dioxide Level


 


 


 


 25 mmol/L


(21-32)


 


Anion Gap    11 (6-14) 


 


Blood Urea Nitrogen


 


 


 


 38 mg/dL


(7-20)


 


Creatinine


 


 


 


 5.2 mg/dL


(0.6-1.0)


 


Estimated GFR


(Cockcroft-Gault) 


 


 


 8.8 





 


BUN/Creatinine Ratio    7 (6-20) 


 


Glucose Level


 


 


 


 223 mg/dL


(70-99)


 


Calcium Level


 


 


 


 < 5.0 mg/dL


(8.5-10.1)


 


Phosphorus Level


 


 


 


 4.4 mg/dL


(2.6-4.7)


 


Magnesium Level


 


 


 


 1.3 mg/dL


(1.8-2.4)


 


Total Bilirubin


 


 


 


 1.0 mg/dL


(0.2-1.0)


 


Aspartate Amino Transf


(AST/SGOT) 


 


 


 127 U/L


(15-37)


 


Alanine Aminotransferase


(ALT/SGPT) 


 


 


 103 U/L


(14-59)


 


Alkaline Phosphatase


 


 


 


 57 U/L


()


 


Total Protein


 


 


 


 3.7 g/dL


(6.4-8.2)


 


Albumin


 


 


 


 1.8 g/dL


(3.4-5.0)


 


Albumin/Globulin Ratio    0.9 (1.0-1.7) 


 


Test


 3/19/20


00:58 3/19/20


06:15 3/19/20


06:18 3/19/20


11:25


 


Glucose (Fingerstick)


 204 mg/dL


(70-99) 


 201 mg/dL


(70-99) 





 


White Blood Count


 


 18.1 x10^3/uL


(4.0-11.0) 


 





 


Red Blood Count


 


 3.53 x10^6/uL


(3.50-5.40) 


 





 


Hemoglobin


 


 11.7 g/dL


(12.0-15.5) 


 





 


Hematocrit


 


 34.8 %


(36.0-47.0) 


 





 


Mean Corpuscular Volume  99 fL ()   


 


Mean Corpuscular Hemoglobin  33 pg (25-35)   


 


Mean Corpuscular Hemoglobin


Concent 


 34 g/dL


(31-37) 


 





 


Red Cell Distribution Width


 


 13.7 %


(11.5-14.5) 


 





 


Platelet Count


 


 162 x10^3/uL


(140-400) 


 





 


Neutrophils (%) (Auto)  88 % (31-73)   


 


Lymphocytes (%) (Auto)  7 % (24-48)   


 


Monocytes (%) (Auto)  5 % (0-9)   


 


Eosinophils (%) (Auto)  0 % (0-3)   


 


Basophils (%) (Auto)  0 % (0-3)   


 


Neutrophils # (Auto)


 


 16.0 x10^3/uL


(1.8-7.7) 


 





 


Lymphocytes # (Auto)


 


 1.2 x10^3/uL


(1.0-4.8) 


 





 


Monocytes # (Auto)


 


 0.8 x10^3/uL


(0.0-1.1) 


 





 


Eosinophils # (Auto)


 


 0.0 x10^3/uL


(0.0-0.7) 


 





 


Basophils # (Auto)


 


 0.0 x10^3/uL


(0.0-0.2) 


 





 


Sodium Level


 


 139 mmol/L


(136-145) 


 





 


Potassium Level


 


 3.8 mmol/L


(3.5-5.1) 


 





 


Chloride Level


 


 103 mmol/L


() 


 





 


Carbon Dioxide Level


 


 23 mmol/L


(21-32) 


 





 


Anion Gap  13 (6-14)   


 


Blood Urea Nitrogen


 


 52 mg/dL


(7-20) 


 





 


Creatinine


 


 5.7 mg/dL


(0.6-1.0) 


 





 


Estimated GFR


(Cockcroft-Gault) 


 7.9 


 


 





 


BUN/Creatinine Ratio  9 (6-20)   


 


Glucose Level


 


 236 mg/dL


(70-99) 


 





 


Calcium Level


 


 < 5.0 mg/dL


(8.5-10.1) 


 





 


Phosphorus Level


 


 4.5 mg/dL


(2.6-4.7) 


 





 


Magnesium Level


 


 1.4 mg/dL


(1.8-2.4) 


 





 


Total Bilirubin


 


 0.7 mg/dL


(0.2-1.0) 


 





 


Aspartate Amino Transf


(AST/SGOT) 


 111 U/L


(15-37) 


 





 


Alanine Aminotransferase


(ALT/SGPT) 


 84 U/L (14-59) 


 


 





 


Alkaline Phosphatase


 


 57 U/L


() 


 





 


Total Protein


 


 4.3 g/dL


(6.4-8.2) 


 





 


Albumin


 


 1.5 g/dL


(3.4-5.0) 


 





 


Albumin/Globulin Ratio  0.5 (1.0-1.7)   


 


O2 Saturation    96 % (92-99) 


 


Arterial Blood pH


 


 


 


 7.40


(7.35-7.45)


 


Arterial Blood pCO2 at


Patient Temp 


 


 


 40 mmHg


(35-46)


 


Arterial Blood pO2 at Patient


Temp 


 


 


 84 mmHg


()


 


Arterial Blood HCO3


 


 


 


 25 mmol/L


(21-28)


 


Arterial Blood Base Excess


 


 


 


 0 mmol/L


(-3-3)


 


FiO2    45 


 


Test


 3/19/20


11:26 3/19/20


16:45 3/19/20


17:07 3/20/20


00:21


 


Glucose (Fingerstick)


 176 mg/dL


(70-99) 


 237 mg/dL


(70-99) 259 mg/dL


(70-99)


 


O2 Saturation  97 % (92-99)   


 


Arterial Blood pH


 


 7.46


(7.35-7.45) 


 





 


Arterial Blood pCO2 at


Patient Temp 


 33 mmHg


(35-46) 


 





 


Arterial Blood pO2 at Patient


Temp 


 88 mmHg


() 


 





 


Arterial Blood HCO3


 


 23 mmol/L


(21-28) 


 





 


Arterial Blood Base Excess


 


 0 mmol/L


(-3-3) 


 





 


FiO2  60   


 


Test


 3/20/20


06:00 3/20/20


06:04 3/20/20


06:45 3/20/20


08:00


 


White Blood Count


 16.8 x10^3/uL


(4.0-11.0) 


 


 





 


Red Blood Count


 3.26 x10^6/uL


(3.50-5.40) 


 


 





 


Hemoglobin


 10.8 g/dL


(12.0-15.5) 


 


 





 


Hematocrit


 32.0 %


(36.0-47.0) 


 


 





 


Mean Corpuscular Volume 98 fL ()    


 


Mean Corpuscular Hemoglobin 33 pg (25-35)    


 


Mean Corpuscular Hemoglobin


Concent 34 g/dL


(31-37) 


 


 





 


Red Cell Distribution Width


 13.4 %


(11.5-14.5) 


 


 





 


Platelet Count


 141 x10^3/uL


(140-400) 


 


 





 


Neutrophils (%) (Auto) 91 % (31-73)    


 


Lymphocytes (%) (Auto) 5 % (24-48)    


 


Monocytes (%) (Auto) 4 % (0-9)    


 


Eosinophils (%) (Auto) 0 % (0-3)    


 


Basophils (%) (Auto) 0 % (0-3)    


 


Neutrophils # (Auto)


 15.3 x10^3/uL


(1.8-7.7) 


 


 





 


Lymphocytes # (Auto)


 0.8 x10^3/uL


(1.0-4.8) 


 


 





 


Monocytes # (Auto)


 0.7 x10^3/uL


(0.0-1.1) 


 


 





 


Eosinophils # (Auto)


 0.1 x10^3/uL


(0.0-0.7) 


 


 





 


Basophils # (Auto)


 0.0 x10^3/uL


(0.0-0.2) 


 


 





 


Sodium Level


 139 mmol/L


(136-145) 


 


 





 


Potassium Level


 2.4 mmol/L


(3.5-5.1) 


 


 





 


Chloride Level


 103 mmol/L


() 


 


 





 


Carbon Dioxide Level


 22 mmol/L


(21-32) 


 


 





 


Anion Gap 14 (6-14)    


 


Blood Urea Nitrogen


 41 mg/dL


(7-20) 


 


 





 


Creatinine


 5.0 mg/dL


(0.6-1.0) 


 


 





 


Estimated GFR


(Cockcroft-Gault) 9.2 


 


 


 





 


BUN/Creatinine Ratio 8 (6-20)    


 


Glucose Level


 219 mg/dL


(70-99) 


 


 





 


Calcium Level


 < 5.0 mg/dL


(8.5-10.1) 


 


 





 


Phosphorus Level


 2.5 mg/dL


(2.6-4.7) 


 


 





 


Magnesium Level


 1.9 mg/dL


(1.8-2.4) 


 


 





 


Total Bilirubin


 0.5 mg/dL


(0.2-1.0) 


 


 





 


Aspartate Amino Transf


(AST/SGOT) 56 U/L (15-37) 


 


 


 





 


Alanine Aminotransferase


(ALT/SGPT) 45 U/L (14-59) 


 


 


 





 


Alkaline Phosphatase


 55 U/L


() 


 


 





 


Total Protein


 3.8 g/dL


(6.4-8.2) 


 


 





 


Albumin


 1.0 g/dL


(3.4-5.0) 


 


 





 


Albumin/Globulin Ratio 0.4 (1.0-1.7)    


 


Triglycerides Level


 214 mg/dL


(0-150) 


 


 





 


Glucose (Fingerstick)


 


 218 mg/dL


(70-99) 


 





 


Ionized Calcium


 


 


 < 0.25 mmol/L


(1.13-1.32) 





 


O2 Saturation    84 % (92-99) 


 


Arterial Blood pH


 


 


 


 7.36


(7.35-7.45)


 


Arterial Blood pCO2 at


Patient Temp 


 


 


 43 mmHg


(35-46)


 


Arterial Blood pO2 at Patient


Temp 


 


 


 51 mmHg


()


 


Arterial Blood HCO3


 


 


 


 24 mmol/L


(21-28)


 


Arterial Blood Base Excess


 


 


 


 -2 mmol/L


(-3-3)


 


FiO2    Bipap 60% 


 


Test


 3/20/20


11:25 3/20/20


11:30 3/20/20


12:21 





 


O2 Saturation 99 % (92-99)    


 


Arterial Blood pH


 7.42


(7.35-7.45) 


 


 





 


Arterial Blood pCO2 at


Patient Temp 53 mmHg


(35-46) 


 


 





 


Arterial Blood pO2 at Patient


Temp 136 mmHg


() 


 


 





 


Arterial Blood HCO3


 33 mmol/L


(21-28) 


 


 





 


Arterial Blood Base Excess


 7 mmol/L


(-3-3) 


 


 





 


FiO2 60    


 


Potassium Level


 


 3.4 mmol/L


(3.5-5.1) 


 





 


Glucose (Fingerstick)


 


 


 135 mg/dL


(70-99) 











Laboratory Tests








Test


 3/19/20


16:45 3/19/20


17:07 3/20/20


00:21 3/20/20


06:00


 


O2 Saturation 97 % (92-99)    


 


Arterial Blood pH


 7.46


(7.35-7.45) 


 


 





 


Arterial Blood pCO2 at


Patient Temp 33 mmHg


(35-46) 


 


 





 


Arterial Blood pO2 at Patient


Temp 88 mmHg


() 


 


 





 


Arterial Blood HCO3


 23 mmol/L


(21-28) 


 


 





 


Arterial Blood Base Excess


 0 mmol/L


(-3-3) 


 


 





 


FiO2 60    


 


Glucose (Fingerstick)


 


 237 mg/dL


(70-99) 259 mg/dL


(70-99) 





 


White Blood Count


 


 


 


 16.8 x10^3/uL


(4.0-11.0)


 


Red Blood Count


 


 


 


 3.26 x10^6/uL


(3.50-5.40)


 


Hemoglobin


 


 


 


 10.8 g/dL


(12.0-15.5)


 


Hematocrit


 


 


 


 32.0 %


(36.0-47.0)


 


Mean Corpuscular Volume    98 fL () 


 


Mean Corpuscular Hemoglobin    33 pg (25-35) 


 


Mean Corpuscular Hemoglobin


Concent 


 


 


 34 g/dL


(31-37)


 


Red Cell Distribution Width


 


 


 


 13.4 %


(11.5-14.5)


 


Platelet Count


 


 


 


 141 x10^3/uL


(140-400)


 


Neutrophils (%) (Auto)    91 % (31-73) 


 


Lymphocytes (%) (Auto)    5 % (24-48) 


 


Monocytes (%) (Auto)    4 % (0-9) 


 


Eosinophils (%) (Auto)    0 % (0-3) 


 


Basophils (%) (Auto)    0 % (0-3) 


 


Neutrophils # (Auto)


 


 


 


 15.3 x10^3/uL


(1.8-7.7)


 


Lymphocytes # (Auto)


 


 


 


 0.8 x10^3/uL


(1.0-4.8)


 


Monocytes # (Auto)


 


 


 


 0.7 x10^3/uL


(0.0-1.1)


 


Eosinophils # (Auto)


 


 


 


 0.1 x10^3/uL


(0.0-0.7)


 


Basophils # (Auto)


 


 


 


 0.0 x10^3/uL


(0.0-0.2)


 


Sodium Level


 


 


 


 139 mmol/L


(136-145)


 


Potassium Level


 


 


 


 2.4 mmol/L


(3.5-5.1)


 


Chloride Level


 


 


 


 103 mmol/L


()


 


Carbon Dioxide Level


 


 


 


 22 mmol/L


(21-32)


 


Anion Gap    14 (6-14) 


 


Blood Urea Nitrogen


 


 


 


 41 mg/dL


(7-20)


 


Creatinine


 


 


 


 5.0 mg/dL


(0.6-1.0)


 


Estimated GFR


(Cockcroft-Gault) 


 


 


 9.2 





 


BUN/Creatinine Ratio    8 (6-20) 


 


Glucose Level


 


 


 


 219 mg/dL


(70-99)


 


Calcium Level


 


 


 


 < 5.0 mg/dL


(8.5-10.1)


 


Phosphorus Level


 


 


 


 2.5 mg/dL


(2.6-4.7)


 


Magnesium Level


 


 


 


 1.9 mg/dL


(1.8-2.4)


 


Total Bilirubin


 


 


 


 0.5 mg/dL


(0.2-1.0)


 


Aspartate Amino Transf


(AST/SGOT) 


 


 


 56 U/L (15-37) 





 


Alanine Aminotransferase


(ALT/SGPT) 


 


 


 45 U/L (14-59) 





 


Alkaline Phosphatase


 


 


 


 55 U/L


()


 


Total Protein


 


 


 


 3.8 g/dL


(6.4-8.2)


 


Albumin


 


 


 


 1.0 g/dL


(3.4-5.0)


 


Albumin/Globulin Ratio    0.4 (1.0-1.7) 


 


Triglycerides Level


 


 


 


 214 mg/dL


(0-150)


 


Test


 3/20/20


06:04 3/20/20


06:45 3/20/20


08:00 3/20/20


11:25


 


Glucose (Fingerstick)


 218 mg/dL


(70-99) 


 


 





 


Ionized Calcium


 


 < 0.25 mmol/L


(1.13-1.32) 


 





 


O2 Saturation   84 % (92-99)  99 % (92-99) 


 


Arterial Blood pH


 


 


 7.36


(7.35-7.45) 7.42


(7.35-7.45)


 


Arterial Blood pCO2 at


Patient Temp 


 


 43 mmHg


(35-46) 53 mmHg


(35-46)


 


Arterial Blood pO2 at Patient


Temp 


 


 51 mmHg


() 136 mmHg


()


 


Arterial Blood HCO3


 


 


 24 mmol/L


(21-28) 33 mmol/L


(21-28)


 


Arterial Blood Base Excess


 


 


 -2 mmol/L


(-3-3) 7 mmol/L


(-3-3)


 


FiO2   Bipap 60%  60 


 


Test


 3/20/20


11:30 3/20/20


12:21 


 





 


Potassium Level


 3.4 mmol/L


(3.5-5.1) 


 


 





 


Glucose (Fingerstick)


 


 135 mg/dL


(70-99) 


 











Medications





Active Scripts








 Medications  Dose


 Route/Sig


 Max Daily Dose Days Date Category


 


 Bisoprolol


 Fumarate 5 Mg


 Tablet  10 Mg


 PO DAILY


   3/16/20 Reported











Impression


.


IMPRESSION:


1.  Acute hypoxemic respiratory failure secondary to acute pancreatitis, sepsis,

abdominal distention, and pneumonia.


2.  Gallstone pancreatitis.


3.  Severe metabolic acidosis.


4.  Acute kidney injury. ON HD


5.  Acute gallstone pancreatitis.


6.  Hypoalbuminemia.


7.  Hypocalcemia.





Plan


.


WILL CONTINUE SUPPORT FOR NOW DOES NOT NEED INTUBATION


HD PER NEPHRO


REPLACE CALCIUM


WILL CONTINUE BIPAP


D/W WITH DR HEIN


TPN


FOLLOW SURGERY INPUT











STEVE MIRANDA MD              Mar 20, 2020 15:28

## 2020-03-21 VITALS — SYSTOLIC BLOOD PRESSURE: 94 MMHG | DIASTOLIC BLOOD PRESSURE: 50 MMHG

## 2020-03-21 VITALS — DIASTOLIC BLOOD PRESSURE: 57 MMHG | SYSTOLIC BLOOD PRESSURE: 111 MMHG

## 2020-03-21 VITALS — DIASTOLIC BLOOD PRESSURE: 59 MMHG | SYSTOLIC BLOOD PRESSURE: 96 MMHG

## 2020-03-21 VITALS — DIASTOLIC BLOOD PRESSURE: 65 MMHG | SYSTOLIC BLOOD PRESSURE: 119 MMHG

## 2020-03-21 VITALS — SYSTOLIC BLOOD PRESSURE: 94 MMHG | DIASTOLIC BLOOD PRESSURE: 59 MMHG

## 2020-03-21 VITALS — DIASTOLIC BLOOD PRESSURE: 66 MMHG | SYSTOLIC BLOOD PRESSURE: 105 MMHG

## 2020-03-21 VITALS — DIASTOLIC BLOOD PRESSURE: 58 MMHG | SYSTOLIC BLOOD PRESSURE: 102 MMHG

## 2020-03-21 VITALS — DIASTOLIC BLOOD PRESSURE: 58 MMHG | SYSTOLIC BLOOD PRESSURE: 95 MMHG

## 2020-03-21 VITALS — SYSTOLIC BLOOD PRESSURE: 102 MMHG | DIASTOLIC BLOOD PRESSURE: 50 MMHG

## 2020-03-21 VITALS — SYSTOLIC BLOOD PRESSURE: 144 MMHG | DIASTOLIC BLOOD PRESSURE: 56 MMHG

## 2020-03-21 VITALS — DIASTOLIC BLOOD PRESSURE: 58 MMHG | SYSTOLIC BLOOD PRESSURE: 120 MMHG

## 2020-03-21 VITALS — DIASTOLIC BLOOD PRESSURE: 61 MMHG | SYSTOLIC BLOOD PRESSURE: 99 MMHG

## 2020-03-21 VITALS — SYSTOLIC BLOOD PRESSURE: 124 MMHG | DIASTOLIC BLOOD PRESSURE: 61 MMHG

## 2020-03-21 VITALS — DIASTOLIC BLOOD PRESSURE: 51 MMHG | SYSTOLIC BLOOD PRESSURE: 119 MMHG

## 2020-03-21 VITALS — DIASTOLIC BLOOD PRESSURE: 66 MMHG | SYSTOLIC BLOOD PRESSURE: 112 MMHG

## 2020-03-21 VITALS — DIASTOLIC BLOOD PRESSURE: 68 MMHG | SYSTOLIC BLOOD PRESSURE: 109 MMHG

## 2020-03-21 VITALS — SYSTOLIC BLOOD PRESSURE: 82 MMHG | DIASTOLIC BLOOD PRESSURE: 52 MMHG

## 2020-03-21 VITALS — DIASTOLIC BLOOD PRESSURE: 59 MMHG | SYSTOLIC BLOOD PRESSURE: 103 MMHG

## 2020-03-21 VITALS — DIASTOLIC BLOOD PRESSURE: 57 MMHG | SYSTOLIC BLOOD PRESSURE: 89 MMHG

## 2020-03-21 VITALS — DIASTOLIC BLOOD PRESSURE: 61 MMHG | SYSTOLIC BLOOD PRESSURE: 88 MMHG

## 2020-03-21 VITALS — DIASTOLIC BLOOD PRESSURE: 55 MMHG | SYSTOLIC BLOOD PRESSURE: 90 MMHG

## 2020-03-21 VITALS — DIASTOLIC BLOOD PRESSURE: 61 MMHG | SYSTOLIC BLOOD PRESSURE: 132 MMHG

## 2020-03-21 VITALS — SYSTOLIC BLOOD PRESSURE: 90 MMHG | DIASTOLIC BLOOD PRESSURE: 52 MMHG

## 2020-03-21 VITALS — SYSTOLIC BLOOD PRESSURE: 121 MMHG | DIASTOLIC BLOOD PRESSURE: 67 MMHG

## 2020-03-21 LAB
ANION GAP SERPL CALC-SCNC: 8 MMOL/L (ref 6–14)
BASE EXCESS ABG: -2 MMOL/L (ref -3–3)
BASE EXCESS ABG: 7 MMOL/L (ref -3–3)
BUN SERPL-MCNC: 39 MG/DL (ref 7–20)
CALCIUM SERPL-MCNC: 6.6 MG/DL (ref 8.5–10.1)
CHLORIDE SERPL-SCNC: 102 MMOL/L (ref 98–107)
CO2 SERPL-SCNC: 28 MMOL/L (ref 21–32)
CREAT SERPL-MCNC: 4.8 MG/DL (ref 0.6–1)
ERYTHROCYTE [DISTWIDTH] IN BLOOD BY AUTOMATED COUNT: 13.4 % (ref 11.5–14.5)
GFR SERPLBLD BASED ON 1.73 SQ M-ARVRAT: 9.6 ML/MIN
GLUCOSE SERPL-MCNC: 263 MG/DL (ref 70–99)
HCO3 BLDA-SCNC: 25 MMOL/L (ref 21–28)
HCO3 BLDA-SCNC: 33 MMOL/L (ref 21–28)
HCT VFR BLD CALC: 29.3 % (ref 36–47)
HGB BLD-MCNC: 9.9 G/DL (ref 12–15.5)
INSPIRATION SETTING TIME VENT: 40
INSPIRATION SETTING TIME VENT: 40
MAGNESIUM SERPL-MCNC: 2.2 MG/DL (ref 1.8–2.4)
MCH RBC QN AUTO: 33 PG (ref 25–35)
MCHC RBC AUTO-ENTMCNC: 34 G/DL (ref 31–37)
MCV RBC AUTO: 98 FL (ref 79–100)
PCO2 BLDA: 54 MMHG (ref 35–46)
PCO2 BLDA: 56 MMHG (ref 35–46)
PHOSPHATE SERPL-MCNC: 3.4 MG/DL (ref 2.6–4.7)
PLATELET # BLD AUTO: 164 X10^3/UL (ref 140–400)
PO2 BLDA: 107 MMHG (ref 75–108)
PO2 BLDA: 71 MMHG (ref 75–108)
POTASSIUM SERPL-SCNC: 3.5 MMOL/L (ref 3.5–5.1)
RBC # BLD AUTO: 2.98 X10^6/UL (ref 3.5–5.4)
SAO2 % BLDA: 93 % (ref 92–99)
SAO2 % BLDA: 97 % (ref 92–99)
SODIUM SERPL-SCNC: 138 MMOL/L (ref 136–145)
WBC # BLD AUTO: 15 X10^3/UL (ref 4–11)

## 2020-03-21 RX ADMIN — HYDROMORPHONE HYDROCHLORIDE PRN MG: 2 INJECTION INTRAMUSCULAR; INTRAVENOUS; SUBCUTANEOUS at 09:40

## 2020-03-21 RX ADMIN — HYDROMORPHONE HYDROCHLORIDE PRN MG: 2 INJECTION INTRAMUSCULAR; INTRAVENOUS; SUBCUTANEOUS at 02:17

## 2020-03-21 RX ADMIN — ACETAMINOPHEN PRN MG: 650 SOLUTION ORAL at 16:36

## 2020-03-21 RX ADMIN — SODIUM BICARBONATE SCH MLS/HR: 84 INJECTION, SOLUTION INTRAVENOUS at 16:05

## 2020-03-21 RX ADMIN — MEROPENEM SCH MLS/HR: 500 INJECTION, POWDER, FOR SOLUTION INTRAVENOUS at 20:57

## 2020-03-21 RX ADMIN — METOPROLOL TARTRATE SCH MG: 5 INJECTION INTRAVENOUS at 17:43

## 2020-03-21 RX ADMIN — HYDROMORPHONE HYDROCHLORIDE PRN MG: 2 INJECTION INTRAMUSCULAR; INTRAVENOUS; SUBCUTANEOUS at 06:02

## 2020-03-21 RX ADMIN — MEROPENEM SCH MLS/HR: 500 INJECTION, POWDER, FOR SOLUTION INTRAVENOUS at 12:53

## 2020-03-21 RX ADMIN — ONDANSETRON PRN MG: 2 INJECTION INTRAMUSCULAR; INTRAVENOUS at 16:15

## 2020-03-21 RX ADMIN — METOPROLOL TARTRATE SCH MG: 5 INJECTION INTRAVENOUS at 12:54

## 2020-03-21 RX ADMIN — HYDROMORPHONE HYDROCHLORIDE PRN MG: 2 INJECTION INTRAMUSCULAR; INTRAVENOUS; SUBCUTANEOUS at 12:51

## 2020-03-21 RX ADMIN — Medication PRN EACH: at 12:39

## 2020-03-21 RX ADMIN — METOPROLOL TARTRATE SCH MG: 5 INJECTION INTRAVENOUS at 06:02

## 2020-03-21 RX ADMIN — INSULIN LISPRO SCH UNITS: 100 INJECTION, SOLUTION INTRAVENOUS; SUBCUTANEOUS at 06:00

## 2020-03-21 RX ADMIN — HYDROMORPHONE HYDROCHLORIDE PRN MG: 2 INJECTION INTRAMUSCULAR; INTRAVENOUS; SUBCUTANEOUS at 20:58

## 2020-03-21 RX ADMIN — INSULIN LISPRO SCH UNITS: 100 INJECTION, SOLUTION INTRAVENOUS; SUBCUTANEOUS at 12:00

## 2020-03-21 RX ADMIN — HYDROMORPHONE HYDROCHLORIDE PRN MG: 2 INJECTION INTRAMUSCULAR; INTRAVENOUS; SUBCUTANEOUS at 17:41

## 2020-03-21 RX ADMIN — ACETAMINOPHEN PRN MG: 650 SOLUTION ORAL at 04:10

## 2020-03-21 RX ADMIN — PANTOPRAZOLE SODIUM SCH MG: 40 INJECTION, POWDER, FOR SOLUTION INTRAVENOUS at 07:43

## 2020-03-21 RX ADMIN — INSULIN LISPRO SCH UNITS: 100 INJECTION, SOLUTION INTRAVENOUS; SUBCUTANEOUS at 18:06

## 2020-03-21 RX ADMIN — MAGNESIUM SULFATE HEPTAHYDRATE SCH MLS/HR: 500 INJECTION, SOLUTION INTRAMUSCULAR; INTRAVENOUS at 02:17

## 2020-03-21 NOTE — PDOC
Infectious Disease Note


Subjective


Subjective


Fever Tmax 101.7 


on BiPAP FiO2 40%


Levophed gtt


TPN





ROS


ROS


unobtainable





Vital Sign


Vital Signs





Vital Signs








  Date Time  Temp Pulse Resp B/P (MAP) Pulse Ox O2 Delivery O2 Flow Rate FiO2


 


3/21/20 08:55     98 BiPAP/CPAP  


 


3/21/20 08:00 98.9 113 14 103/59 (74)    





 98.9       











Physical Exam


PHYSICAL EXAM


GENERAL: Lethargic, on BiPAP, fidgety, + mitts, dialyzing 


HEENT: Mild icterus.  Oral mucosa dry.


NECK:  Supple. Temp HDC cath in place


LUNGS:  Decreased breath sounds at the bases.  Soft wheezes


HEART:  S1, S2, tachycardia, 110s, regular 


ABDOMEN:  Soft, mildly distended, + BS Rectal tube in place


: [place]   


EXTREMITIES: Trace edema, no cyanosis.


DERMATOLOGIC:  Warm and dry.  No generalized rash.  


CENTRAL NERVOUS SYSTEM: Extremely lethargic


RUE-PICC clean





Labs


Lab





Laboratory Tests








Test


 3/20/20


11:25 3/20/20


11:30 3/20/20


12:21 3/20/20


17:46


 


O2 Saturation 99 % (92-99)    


 


Arterial Blood pH


 7.42


(7.35-7.45) 


 


 





 


Arterial Blood pCO2 at


Patient Temp 53 mmHg


(35-46) 


 


 





 


Arterial Blood pO2 at Patient


Temp 136 mmHg


() 


 


 





 


Arterial Blood HCO3


 33 mmol/L


(21-28) 


 


 





 


Arterial Blood Base Excess


 7 mmol/L


(-3-3) 


 


 





 


FiO2 60    


 


Potassium Level


 


 3.4 mmol/L


(3.5-5.1) 


 





 


Glucose (Fingerstick)


 


 


 135 mg/dL


(70-99) 261 mg/dL


(70-99)


 


Test


 3/20/20


23:15 3/21/20


06:07 3/21/20


06:10 3/21/20


07:33


 


Glucose (Fingerstick)


 290 mg/dL


(70-99) 239 mg/dL


(70-99) 


 





 


White Blood Count


 


 


 15.0 x10^3/uL


(4.0-11.0) 





 


Red Blood Count


 


 


 2.98 x10^6/uL


(3.50-5.40) 





 


Hemoglobin


 


 


 9.9 g/dL


(12.0-15.5) 





 


Hematocrit


 


 


 29.3 %


(36.0-47.0) 





 


Mean Corpuscular Volume   98 fL ()  


 


Mean Corpuscular Hemoglobin   33 pg (25-35)  


 


Mean Corpuscular Hemoglobin


Concent 


 


 34 g/dL


(31-37) 





 


Red Cell Distribution Width


 


 


 13.4 %


(11.5-14.5) 





 


Platelet Count


 


 


 164 x10^3/uL


(140-400) 





 


Sodium Level


 


 


 138 mmol/L


(136-145) 





 


Potassium Level


 


 


 3.5 mmol/L


(3.5-5.1) 





 


Chloride Level


 


 


 102 mmol/L


() 





 


Carbon Dioxide Level


 


 


 28 mmol/L


(21-32) 





 


Anion Gap   8 (6-14)  


 


Blood Urea Nitrogen


 


 


 39 mg/dL


(7-20) 





 


Creatinine


 


 


 4.8 mg/dL


(0.6-1.0) 





 


Estimated GFR


(Cockcroft-Gault) 


 


 9.6 


 





 


Glucose Level


 


 


 263 mg/dL


(70-99) 





 


Calcium Level


 


 


 6.6 mg/dL


(8.5-10.1) 





 


Phosphorus Level


 


 


 3.4 mg/dL


(2.6-4.7) 





 


Magnesium Level


 


 


 2.2 mg/dL


(1.8-2.4) 





 


O2 Saturation    97 % (92-99) 


 


Arterial Blood pH


 


 


 


 7.28


(7.35-7.45)


 


Arterial Blood pCO2 at


Patient Temp 


 


 


 54 mmHg


(35-46)


 


Arterial Blood pO2 at Patient


Temp 


 


 


 107 mmHg


()


 


Arterial Blood HCO3


 


 


 


 25 mmol/L


(21-28)


 


Arterial Blood Base Excess


 


 


 


 -2 mmol/L


(-3-3)


 


FiO2    40 





CXR


 


Continued presence of bibasilar infiltrates and pleural effusions without 


interval change.


Micro





Microbiology


3/18/20 Blood Culture - Preliminary, Resulted


          NO GROWTH AFTER 3 DAYS





Objective


Assessment


Fever 


Acute pancreatitis, early developing necrosis


Cholelithiasis


Leucocytosis - trending down 


JED,Hyperkalemia, Metabolic acidosis on HD


Acute hypoxic resp failure, bilateral pleural effusion on BiPAP


Hypotension - on Levophed 


Hypocalcemia 


Prediabetes


HTN





Plan


Plan of Care


cont merrem, renal dosing and Zyvox (3/20) 


f/u labs and cults


cont supportive care


d/w RN





Resp failure on Bipap


On HD


LE mottled


Menstrating 


WBC better





D/w nursing





Attending Co-Sign


Attending Co-Sign


The patient was seen and interviewed as well as examined at the bedside. The 

chart was reviewed. The case was discussed. Agree with the plan of care.











HAVEN WINTERS        Mar 21, 2020 09:27


RASHAWN ROSEN MD              Mar 21, 2020 12:49

## 2020-03-21 NOTE — PDOC
PULMONARY PROGRESS NOTES


Subjective


on bipap, sleepy just had diladid, t max 101.7  follows commands per rn,


Vitals





Vital Signs








  Date Time  Temp Pulse Resp B/P (MAP) Pulse Ox O2 Delivery O2 Flow Rate FiO2


 


3/21/20 06:00  105 22 90/55 (67) 95 BiPAP/CPAP  


 


3/21/20 04:00 101.7       





 101.7       








Comments


discussed w rn, ros as mentioned as above other sys otherwise neg





sleepy on bipap


HEENT:  Other (nc at perrl   bipap mask on   neck no lad   no thyromegaly)


Lungs:  Crackles


Cardiovascular:  S1, S2


Abdomen:  Other (distended,  diffuse pain   no mass)


Extremities:  No Edema


Skin:  Warm


Labs





Laboratory Tests








Test


 3/19/20


06:18 3/19/20


11:25 3/19/20


11:26 3/19/20


16:45


 


Glucose (Fingerstick)


 201 mg/dL


(70-99) 


 176 mg/dL


(70-99) 





 


O2 Saturation  96 % (92-99)   97 % (92-99) 


 


Arterial Blood pH


 


 7.40


(7.35-7.45) 


 7.46


(7.35-7.45)


 


Arterial Blood pCO2 at


Patient Temp 


 40 mmHg


(35-46) 


 33 mmHg


(35-46)


 


Arterial Blood pO2 at Patient


Temp 


 84 mmHg


() 


 88 mmHg


()


 


Arterial Blood HCO3


 


 25 mmol/L


(21-28) 


 23 mmol/L


(21-28)


 


Arterial Blood Base Excess


 


 0 mmol/L


(-3-3) 


 0 mmol/L


(-3-3)


 


FiO2  45   60 


 


Test


 3/19/20


17:07 3/20/20


00:21 3/20/20


06:00 3/20/20


06:04


 


Glucose (Fingerstick)


 237 mg/dL


(70-99) 259 mg/dL


(70-99) 


 218 mg/dL


(70-99)


 


White Blood Count


 


 


 16.8 x10^3/uL


(4.0-11.0) 





 


Red Blood Count


 


 


 3.26 x10^6/uL


(3.50-5.40) 





 


Hemoglobin


 


 


 10.8 g/dL


(12.0-15.5) 





 


Hematocrit


 


 


 32.0 %


(36.0-47.0) 





 


Mean Corpuscular Volume   98 fL ()  


 


Mean Corpuscular Hemoglobin   33 pg (25-35)  


 


Mean Corpuscular Hemoglobin


Concent 


 


 34 g/dL


(31-37) 





 


Red Cell Distribution Width


 


 


 13.4 %


(11.5-14.5) 





 


Platelet Count


 


 


 141 x10^3/uL


(140-400) 





 


Neutrophils (%) (Auto)   91 % (31-73)  


 


Lymphocytes (%) (Auto)   5 % (24-48)  


 


Monocytes (%) (Auto)   4 % (0-9)  


 


Eosinophils (%) (Auto)   0 % (0-3)  


 


Basophils (%) (Auto)   0 % (0-3)  


 


Neutrophils # (Auto)


 


 


 15.3 x10^3/uL


(1.8-7.7) 





 


Lymphocytes # (Auto)


 


 


 0.8 x10^3/uL


(1.0-4.8) 





 


Monocytes # (Auto)


 


 


 0.7 x10^3/uL


(0.0-1.1) 





 


Eosinophils # (Auto)


 


 


 0.1 x10^3/uL


(0.0-0.7) 





 


Basophils # (Auto)


 


 


 0.0 x10^3/uL


(0.0-0.2) 





 


Sodium Level


 


 


 139 mmol/L


(136-145) 





 


Potassium Level


 


 


 2.4 mmol/L


(3.5-5.1) 





 


Chloride Level


 


 


 103 mmol/L


() 





 


Carbon Dioxide Level


 


 


 22 mmol/L


(21-32) 





 


Anion Gap   14 (6-14)  


 


Blood Urea Nitrogen


 


 


 41 mg/dL


(7-20) 





 


Creatinine


 


 


 5.0 mg/dL


(0.6-1.0) 





 


Estimated GFR


(Cockcroft-Gault) 


 


 9.2 


 





 


BUN/Creatinine Ratio   8 (6-20)  


 


Glucose Level


 


 


 219 mg/dL


(70-99) 





 


Calcium Level


 


 


 < 5.0 mg/dL


(8.5-10.1) 





 


Phosphorus Level


 


 


 2.5 mg/dL


(2.6-4.7) 





 


Magnesium Level


 


 


 1.9 mg/dL


(1.8-2.4) 





 


Total Bilirubin


 


 


 0.5 mg/dL


(0.2-1.0) 





 


Aspartate Amino Transf


(AST/SGOT) 


 


 56 U/L (15-37) 


 





 


Alanine Aminotransferase


(ALT/SGPT) 


 


 45 U/L (14-59) 


 





 


Alkaline Phosphatase


 


 


 55 U/L


() 





 


Total Protein


 


 


 3.8 g/dL


(6.4-8.2) 





 


Albumin


 


 


 1.0 g/dL


(3.4-5.0) 





 


Albumin/Globulin Ratio   0.4 (1.0-1.7)  


 


Triglycerides Level


 


 


 214 mg/dL


(0-150) 





 


Test


 3/20/20


06:45 3/20/20


08:00 3/20/20


11:25 3/20/20


11:30


 


Ionized Calcium


 < 0.25 mmol/L


(1.13-1.32) 


 


 





 


O2 Saturation  84 % (92-99)  99 % (92-99)  


 


Arterial Blood pH


 


 7.36


(7.35-7.45) 7.42


(7.35-7.45) 





 


Arterial Blood pCO2 at


Patient Temp 


 43 mmHg


(35-46) 53 mmHg


(35-46) 





 


Arterial Blood pO2 at Patient


Temp 


 51 mmHg


() 136 mmHg


() 





 


Arterial Blood HCO3


 


 24 mmol/L


(21-28) 33 mmol/L


(21-28) 





 


Arterial Blood Base Excess


 


 -2 mmol/L


(-3-3) 7 mmol/L


(-3-3) 





 


FiO2  Bipap 60%  60  


 


Potassium Level


 


 


 


 3.4 mmol/L


(3.5-5.1)


 


Test


 3/20/20


12:21 3/20/20


17:46 3/20/20


23:15 3/21/20


06:07


 


Glucose (Fingerstick)


 135 mg/dL


(70-99) 261 mg/dL


(70-99) 290 mg/dL


(70-99) 239 mg/dL


(70-99)








Laboratory Tests








Test


 3/20/20


06:45 3/20/20


08:00 3/20/20


11:25 3/20/20


11:30


 


Ionized Calcium


 < 0.25 mmol/L


(1.13-1.32) 


 


 





 


O2 Saturation  84 % (92-99)  99 % (92-99)  


 


Arterial Blood pH


 


 7.36


(7.35-7.45) 7.42


(7.35-7.45) 





 


Arterial Blood pCO2 at


Patient Temp 


 43 mmHg


(35-46) 53 mmHg


(35-46) 





 


Arterial Blood pO2 at Patient


Temp 


 51 mmHg


() 136 mmHg


() 





 


Arterial Blood HCO3


 


 24 mmol/L


(21-28) 33 mmol/L


(21-28) 





 


Arterial Blood Base Excess


 


 -2 mmol/L


(-3-3) 7 mmol/L


(-3-3) 





 


FiO2  Bipap 60%  60  


 


Potassium Level


 


 


 


 3.4 mmol/L


(3.5-5.1)


 


Test


 3/20/20


12:21 3/20/20


17:46 3/20/20


23:15 3/21/20


06:07


 


Glucose (Fingerstick)


 135 mg/dL


(70-99) 261 mg/dL


(70-99) 290 mg/dL


(70-99) 239 mg/dL


(70-99)








Medications





Active Scripts








 Medications  Dose


 Route/Sig


 Max Daily Dose Days Date Category


 


 Bisoprolol


 Fumarate 5 Mg


 Tablet  10 Mg


 PO DAILY


   3/16/20 Reported








Comments


reviewed cxr 3/21





Continued presence of bibasilar infiltrates and pleural effusions without 


interval change.





Impression


.


IMPRESSION:


1.  Acute hypoxemic respiratory failure secondary to acute pancreatitis, sepsis,

abdominal distention, and pneumonia.


2.  Gallstone pancreatitis.


3.  Severe metabolic acidosis.


4.  Acute kidney injury. ON HD


5.  Acute gallstone pancreatitis.


6.  Hypoalbuminemia.


7.  Hypocalcemia.





Plan


.


cont bipap prn during day, cont at night, setting reviewed


02 titration


BD


avoid over sedation


elevate hob


protonix  scds for prophylaxis


HD PER NEPHRO


REPLACE CALCIUM


TPN


FOLLOW SURGERY INPUT





discussed w MAN Jennings MD               Mar 21, 2020 06:23

## 2020-03-21 NOTE — RAD
Chest AP portable at 0519:

 

Reason for examination: Pleural effusion.

 

Comparison is made to previous study dated 3/20/2020.

 

Double lumen catheter, right PICC line and left subclavian line remain 

present. NG tube is present and in satisfactory position. Heart and 

mediastinum are unchanged. Lung fields continue show bibasilar infiltrates

and moderate bilateral pleural effusions which show no significant change.

No acute bony abnormalities are seen.

 

IMPRESSION:

 

Continued presence of bibasilar infiltrates and pleural effusions without 

interval change.

 

Electronically signed by: Ladan Mckenna MD (3/21/2020 5:34 AM) [id number]

## 2020-03-21 NOTE — PDOC
Renal-Progress Notes


Subjective Notes


Notes


NO CHANGE





History of Present Illness


Hx of present illness


STABLE





Vitals


Vitals





Vital Signs








  Date Time  Temp Pulse Resp B/P (MAP) Pulse Ox O2 Delivery O2 Flow Rate FiO2


 


3/21/20 15:00  130 20 119/51 (73) 97 BiPAP/CPAP  


 


3/21/20 12:00 99.0       





 99.0       








Weight


Weight [ ]





I.O.


Intake and Output











Intake and Output 


 


 3/21/20





 07:00


 


Intake Total 4855.4 ml


 


Output Total 980 ml


 


Balance 3875.4 ml


 


 


 


Intake Oral 0 ml


 


IV Total 4855.4 ml


 


Output Urine Total 80 ml


 


Stool Total 900 ml











Labs


Labs





Laboratory Tests








Test


 3/20/20


17:46 3/20/20


23:15 3/21/20


06:07 3/21/20


06:10


 


Glucose (Fingerstick)


 261 mg/dL


(70-99) 290 mg/dL


(70-99) 239 mg/dL


(70-99) 





 


White Blood Count


 


 


 


 15.0 x10^3/uL


(4.0-11.0)


 


Red Blood Count


 


 


 


 2.98 x10^6/uL


(3.50-5.40)


 


Hemoglobin


 


 


 


 9.9 g/dL


(12.0-15.5)


 


Hematocrit


 


 


 


 29.3 %


(36.0-47.0)


 


Mean Corpuscular Volume    98 fL () 


 


Mean Corpuscular Hemoglobin    33 pg (25-35) 


 


Mean Corpuscular Hemoglobin


Concent 


 


 


 34 g/dL


(31-37)


 


Red Cell Distribution Width


 


 


 


 13.4 %


(11.5-14.5)


 


Platelet Count


 


 


 


 164 x10^3/uL


(140-400)


 


Sodium Level


 


 


 


 138 mmol/L


(136-145)


 


Potassium Level


 


 


 


 3.5 mmol/L


(3.5-5.1)


 


Chloride Level


 


 


 


 102 mmol/L


()


 


Carbon Dioxide Level


 


 


 


 28 mmol/L


(21-32)


 


Anion Gap    8 (6-14) 


 


Blood Urea Nitrogen


 


 


 


 39 mg/dL


(7-20)


 


Creatinine


 


 


 


 4.8 mg/dL


(0.6-1.0)


 


Estimated GFR


(Cockcroft-Gault) 


 


 


 9.6 





 


Glucose Level


 


 


 


 263 mg/dL


(70-99)


 


Calcium Level


 


 


 


 6.6 mg/dL


(8.5-10.1)


 


Phosphorus Level


 


 


 


 3.4 mg/dL


(2.6-4.7)


 


Magnesium Level


 


 


 


 2.2 mg/dL


(1.8-2.4)


 


Test


 3/21/20


07:33 3/21/20


12:13 


 





 


O2 Saturation 97 % (92-99)    


 


Arterial Blood pH


 7.28


(7.35-7.45) 


 


 





 


Arterial Blood pCO2 at


Patient Temp 54 mmHg


(35-46) 


 


 





 


Arterial Blood pO2 at Patient


Temp 107 mmHg


() 


 


 





 


Arterial Blood HCO3


 25 mmol/L


(21-28) 


 


 





 


Arterial Blood Base Excess


 -2 mmol/L


(-3-3) 


 


 





 


FiO2 40    


 


Glucose (Fingerstick)


 


 137 mg/dL


(70-99) 


 














Micro


Micro





Microbiology


3/18/20 Blood Culture - Preliminary, Resulted


          NO GROWTH AFTER 3 DAYS





Review of Systems


Constitutional:  yes: other (DIFFICULT TO OBTAIN)





Physical Exam


General Appearance:  moderate distress


Skin:  warm


Respiratory:  decreased breath sounds


Heart:  S1S2


Abdomen:  soft, bowel sounds present


Genitourinary:  bladder flat


Extremities:  pulses present


Neurology:  alert





Assessment


Assessment


IMP





[id number]


HYPERKALEMIA-BETTER


ACIDOSIS AND ACIDEMIA


ACUTE REPS FAILURE


ACUTE PANCREATITIS


HYPOALBUMINEMIA


HYPOCALCEMIA-BETTER





PLAN





TPN


STOP CALCIUM REPLACEMENT


HD TO CORRECT ACID BASE AND LYTES


CONT HCO3 GTT


ON BIPAP


MAY NEED VENT SUPPORT


WILL FOLLOW











BETH HEIN MD                 Mar 21, 2020 15:22

## 2020-03-21 NOTE — PDOC
PROGRESS NOTES


Chief Complaint


Chief Complaint


A/P:


Acute pancreatitis - with necrosis/infection, likely gallstone pancreatitis. GI,

ID, and general surgery consulted. Will keep NPO, pain control.


Calculus of gallbladder with acute cholecystitis without obstruction - with 

secondary pancreatitis. Lap naif is indicated, will need to await pancreatitis 

resolution


Prediabetes - will keep on sliding scale


HTN - cont prn hydralazine


Sepsis - with acute pancreatitis as end organ dysfunction, sign of infection, 

zyvox, merrem


Acute kidney failure now requiring dialysis


Salpingitis


Uterine fibroid


Hypoxia with respiratory failure - requiring bipap, likely pulmonary edema 

related to pancreatitis, sepsis


Intractable pain


Intractable nausea





FEN - NPO


PPX - heparin


FULL CODE


Dispo - inpatient for 2 midnights. Transferred to ICU for worsening pancreatitis

symptoms


CC time 45 minutes





History of Present Illness


History of Present Illness


Ms Tadeo is a 48yo F w/ PMHx HTN, prediabetes who presents the emergency room

complaints of abdominal pain. Patient described off and on 3 days. She states is

constant, described as a squeezing sensation in a band-like distribution. + 

nausea, vomiting.  She denies any fever or diarrhea.  Patient denies any 

abdominal surgical procedures.  She states is worse with movements, car ride.  

Pain initially was upper abdomen however now pretty much generalized.  Last 

bowel movement was 3/15/2020. Nothing makes her pain better.  Patient denies any

shortness of breath.  She does state the pain moves into her chest.  Denies any 

headache or visual changes.


Lipase 28278, , , Bilirubin 1.4.


CT abdomen confirms pancreatic inflammation, peripancreatic fluid and inflam

matory changes around the pancreas consistent with pancreatitis. Cholelithiasis 

and 1.4cm uterine fibroid as well as possible left salpingitis. Admitted for 

further care


GI, General surgery, ID, Pulm consulted.





3/17: Overnight per report no urine output. Added dilaudid for pain, PICC placed

per IR. Renal US negative.Seen bedside in ICU, given 2L additional NSS and 

albumin infusion. Still hypotensive, started on levophed. Repeat CT abdomen with

necrosis. Updated her fiancee


3/18: Sats are only 87% on nasal cannula oxygen. Dialysis catheter per 

nephrology


3/19: She is now on BiPAP appears more ill, now on dialysis


3/20: Seen on BiPAP. Her mother and another family member are present and seemed

to be good support for her. Currently on dialysis. Appears critically ill





Overnight Tmax 101.7 , still on BiPAP FiO2 40%, still on low dose Levophed gtt, 

TPN initiated. On dialysis





Vitals


Vitals





Vital Signs








  Date Time  Temp Pulse Resp B/P (MAP) Pulse Ox O2 Delivery O2 Flow Rate FiO2


 


3/21/20 09:40   28  98   


 


3/21/20 09:00  112  111/57 (75)  BiPAP/CPAP  


 


3/21/20 08:00 98.9       





 98.9       











Physical Exam


Physical Exam


GENERAL: Lethargic, on BiPAP, fidgety, + mitts, dialyzing 


HEENT: Mild icterus.  Oral mucosa dry.


NECK:  Supple. Temp HDC cath in place


LUNGS:  Decreased breath sounds at the bases.  Soft wheezes


HEART:  S1, S2, tachycardia, 110s, regular 


ABDOMEN:  Soft, mildly distended, + BS Rectal tube in place


: [place]   


EXTREMITIES: Trace edema, no cyanosis.


DERMATOLOGIC:  Warm and dry.  No generalized rash.  


CENTRAL NERVOUS SYSTEM: Extremely lethargic


RUE-PICC clean


General:  Other (ill appearing )


Heart:  Other (increased rate)


Lungs:  Crackles


Abdomen:  Other (obese,soft)


Extremities:  No edema, Other (SOME CLUBBING )


Skin:  No rashes, No breakdown, No significant lesion





Labs


LABS





Laboratory Tests








Test


 3/20/20


11:25 3/20/20


11:30 3/20/20


12:21 3/20/20


17:46


 


O2 Saturation 99 % (92-99)    


 


Arterial Blood pH


 7.42


(7.35-7.45) 


 


 





 


Arterial Blood pCO2 at


Patient Temp 53 mmHg


(35-46) 


 


 





 


Arterial Blood pO2 at Patient


Temp 136 mmHg


() 


 


 





 


Arterial Blood HCO3


 33 mmol/L


(21-28) 


 


 





 


Arterial Blood Base Excess


 7 mmol/L


(-3-3) 


 


 





 


FiO2 60    


 


Potassium Level


 


 3.4 mmol/L


(3.5-5.1) 


 





 


Glucose (Fingerstick)


 


 


 135 mg/dL


(70-99) 261 mg/dL


(70-99)


 


Test


 3/20/20


23:15 3/21/20


06:07 3/21/20


06:10 3/21/20


07:33


 


Glucose (Fingerstick)


 290 mg/dL


(70-99) 239 mg/dL


(70-99) 


 





 


White Blood Count


 


 


 15.0 x10^3/uL


(4.0-11.0) 





 


Red Blood Count


 


 


 2.98 x10^6/uL


(3.50-5.40) 





 


Hemoglobin


 


 


 9.9 g/dL


(12.0-15.5) 





 


Hematocrit


 


 


 29.3 %


(36.0-47.0) 





 


Mean Corpuscular Volume   98 fL ()  


 


Mean Corpuscular Hemoglobin   33 pg (25-35)  


 


Mean Corpuscular Hemoglobin


Concent 


 


 34 g/dL


(31-37) 





 


Red Cell Distribution Width


 


 


 13.4 %


(11.5-14.5) 





 


Platelet Count


 


 


 164 x10^3/uL


(140-400) 





 


Sodium Level


 


 


 138 mmol/L


(136-145) 





 


Potassium Level


 


 


 3.5 mmol/L


(3.5-5.1) 





 


Chloride Level


 


 


 102 mmol/L


() 





 


Carbon Dioxide Level


 


 


 28 mmol/L


(21-32) 





 


Anion Gap   8 (6-14)  


 


Blood Urea Nitrogen


 


 


 39 mg/dL


(7-20) 





 


Creatinine


 


 


 4.8 mg/dL


(0.6-1.0) 





 


Estimated GFR


(Cockcroft-Gault) 


 


 9.6 


 





 


Glucose Level


 


 


 263 mg/dL


(70-99) 





 


Calcium Level


 


 


 6.6 mg/dL


(8.5-10.1) 





 


Phosphorus Level


 


 


 3.4 mg/dL


(2.6-4.7) 





 


Magnesium Level


 


 


 2.2 mg/dL


(1.8-2.4) 





 


O2 Saturation    97 % (92-99) 


 


Arterial Blood pH


 


 


 


 7.28


(7.35-7.45)


 


Arterial Blood pCO2 at


Patient Temp 


 


 


 54 mmHg


(35-46)


 


Arterial Blood pO2 at Patient


Temp 


 


 


 107 mmHg


()


 


Arterial Blood HCO3


 


 


 


 25 mmol/L


(21-28)


 


Arterial Blood Base Excess


 


 


 


 -2 mmol/L


(-3-3)


 


FiO2    40 











Assessment and Plan


Assessmemt and Plan


Problems


Medical Problems:


(1) Acute pancreatitis


Status: Acute  





(2) Cholelithiasis


Status: Acute  











Comment


Review of Relevant


I have reviewed the following items josy (where applicable) has been applied.


Labs





Laboratory Tests








Test


 3/19/20


11:25 3/19/20


11:26 3/19/20


16:45 3/19/20


17:07


 


O2 Saturation 96 % (92-99)   97 % (92-99)  


 


Arterial Blood pH


 7.40


(7.35-7.45) 


 7.46


(7.35-7.45) 





 


Arterial Blood pCO2 at


Patient Temp 40 mmHg


(35-46) 


 33 mmHg


(35-46) 





 


Arterial Blood pO2 at Patient


Temp 84 mmHg


() 


 88 mmHg


() 





 


Arterial Blood HCO3


 25 mmol/L


(21-28) 


 23 mmol/L


(21-28) 





 


Arterial Blood Base Excess


 0 mmol/L


(-3-3) 


 0 mmol/L


(-3-3) 





 


FiO2 45   60  


 


Glucose (Fingerstick)


 


 176 mg/dL


(70-99) 


 237 mg/dL


(70-99)


 


Test


 3/20/20


00:21 3/20/20


06:00 3/20/20


06:04 3/20/20


06:45


 


Glucose (Fingerstick)


 259 mg/dL


(70-99) 


 218 mg/dL


(70-99) 





 


White Blood Count


 


 16.8 x10^3/uL


(4.0-11.0) 


 





 


Red Blood Count


 


 3.26 x10^6/uL


(3.50-5.40) 


 





 


Hemoglobin


 


 10.8 g/dL


(12.0-15.5) 


 





 


Hematocrit


 


 32.0 %


(36.0-47.0) 


 





 


Mean Corpuscular Volume  98 fL ()   


 


Mean Corpuscular Hemoglobin  33 pg (25-35)   


 


Mean Corpuscular Hemoglobin


Concent 


 34 g/dL


(31-37) 


 





 


Red Cell Distribution Width


 


 13.4 %


(11.5-14.5) 


 





 


Platelet Count


 


 141 x10^3/uL


(140-400) 


 





 


Neutrophils (%) (Auto)  91 % (31-73)   


 


Lymphocytes (%) (Auto)  5 % (24-48)   


 


Monocytes (%) (Auto)  4 % (0-9)   


 


Eosinophils (%) (Auto)  0 % (0-3)   


 


Basophils (%) (Auto)  0 % (0-3)   


 


Neutrophils # (Auto)


 


 15.3 x10^3/uL


(1.8-7.7) 


 





 


Lymphocytes # (Auto)


 


 0.8 x10^3/uL


(1.0-4.8) 


 





 


Monocytes # (Auto)


 


 0.7 x10^3/uL


(0.0-1.1) 


 





 


Eosinophils # (Auto)


 


 0.1 x10^3/uL


(0.0-0.7) 


 





 


Basophils # (Auto)


 


 0.0 x10^3/uL


(0.0-0.2) 


 





 


Sodium Level


 


 139 mmol/L


(136-145) 


 





 


Potassium Level


 


 2.4 mmol/L


(3.5-5.1) 


 





 


Chloride Level


 


 103 mmol/L


() 


 





 


Carbon Dioxide Level


 


 22 mmol/L


(21-32) 


 





 


Anion Gap  14 (6-14)   


 


Blood Urea Nitrogen


 


 41 mg/dL


(7-20) 


 





 


Creatinine


 


 5.0 mg/dL


(0.6-1.0) 


 





 


Estimated GFR


(Cockcroft-Gault) 


 9.2 


 


 





 


BUN/Creatinine Ratio  8 (6-20)   


 


Glucose Level


 


 219 mg/dL


(70-99) 


 





 


Calcium Level


 


 < 5.0 mg/dL


(8.5-10.1) 


 





 


Phosphorus Level


 


 2.5 mg/dL


(2.6-4.7) 


 





 


Magnesium Level


 


 1.9 mg/dL


(1.8-2.4) 


 





 


Total Bilirubin


 


 0.5 mg/dL


(0.2-1.0) 


 





 


Aspartate Amino Transf


(AST/SGOT) 


 56 U/L (15-37) 


 


 





 


Alanine Aminotransferase


(ALT/SGPT) 


 45 U/L (14-59) 


 


 





 


Alkaline Phosphatase


 


 55 U/L


() 


 





 


Total Protein


 


 3.8 g/dL


(6.4-8.2) 


 





 


Albumin


 


 1.0 g/dL


(3.4-5.0) 


 





 


Albumin/Globulin Ratio  0.4 (1.0-1.7)   


 


Triglycerides Level


 


 214 mg/dL


(0-150) 


 





 


Ionized Calcium


 


 


 


 < 0.25 mmol/L


(1.13-1.32)


 


Test


 3/20/20


08:00 3/20/20


11:25 3/20/20


11:30 3/20/20


12:21


 


O2 Saturation 84 % (92-99)  99 % (92-99)   


 


Arterial Blood pH


 7.36


(7.35-7.45) 7.42


(7.35-7.45) 


 





 


Arterial Blood pCO2 at


Patient Temp 43 mmHg


(35-46) 53 mmHg


(35-46) 


 





 


Arterial Blood pO2 at Patient


Temp 51 mmHg


() 136 mmHg


() 


 





 


Arterial Blood HCO3


 24 mmol/L


(21-28) 33 mmol/L


(21-28) 


 





 


Arterial Blood Base Excess


 -2 mmol/L


(-3-3) 7 mmol/L


(-3-3) 


 





 


FiO2 Bipap 60%  60   


 


Potassium Level


 


 


 3.4 mmol/L


(3.5-5.1) 





 


Glucose (Fingerstick)


 


 


 


 135 mg/dL


(70-99)


 


Test


 3/20/20


17:46 3/20/20


23:15 3/21/20


06:07 3/21/20


06:10


 


Glucose (Fingerstick)


 261 mg/dL


(70-99) 290 mg/dL


(70-99) 239 mg/dL


(70-99) 





 


White Blood Count


 


 


 


 15.0 x10^3/uL


(4.0-11.0)


 


Red Blood Count


 


 


 


 2.98 x10^6/uL


(3.50-5.40)


 


Hemoglobin


 


 


 


 9.9 g/dL


(12.0-15.5)


 


Hematocrit


 


 


 


 29.3 %


(36.0-47.0)


 


Mean Corpuscular Volume    98 fL () 


 


Mean Corpuscular Hemoglobin    33 pg (25-35) 


 


Mean Corpuscular Hemoglobin


Concent 


 


 


 34 g/dL


(31-37)


 


Red Cell Distribution Width


 


 


 


 13.4 %


(11.5-14.5)


 


Platelet Count


 


 


 


 164 x10^3/uL


(140-400)


 


Sodium Level


 


 


 


 138 mmol/L


(136-145)


 


Potassium Level


 


 


 


 3.5 mmol/L


(3.5-5.1)


 


Chloride Level


 


 


 


 102 mmol/L


()


 


Carbon Dioxide Level


 


 


 


 28 mmol/L


(21-32)


 


Anion Gap    8 (6-14) 


 


Blood Urea Nitrogen


 


 


 


 39 mg/dL


(7-20)


 


Creatinine


 


 


 


 4.8 mg/dL


(0.6-1.0)


 


Estimated GFR


(Cockcroft-Gault) 


 


 


 9.6 





 


Glucose Level


 


 


 


 263 mg/dL


(70-99)


 


Calcium Level


 


 


 


 6.6 mg/dL


(8.5-10.1)


 


Phosphorus Level


 


 


 


 3.4 mg/dL


(2.6-4.7)


 


Magnesium Level


 


 


 


 2.2 mg/dL


(1.8-2.4)


 


Test


 3/21/20


07:33 


 


 





 


O2 Saturation 97 % (92-99)    


 


Arterial Blood pH


 7.28


(7.35-7.45) 


 


 





 


Arterial Blood pCO2 at


Patient Temp 54 mmHg


(35-46) 


 


 





 


Arterial Blood pO2 at Patient


Temp 107 mmHg


() 


 


 





 


Arterial Blood HCO3


 25 mmol/L


(21-28) 


 


 





 


Arterial Blood Base Excess


 -2 mmol/L


(-3-3) 


 


 





 


FiO2 40    








Laboratory Tests








Test


 3/20/20


11:25 3/20/20


11:30 3/20/20


12:21 3/20/20


17:46


 


O2 Saturation 99 % (92-99)    


 


Arterial Blood pH


 7.42


(7.35-7.45) 


 


 





 


Arterial Blood pCO2 at


Patient Temp 53 mmHg


(35-46) 


 


 





 


Arterial Blood pO2 at Patient


Temp 136 mmHg


() 


 


 





 


Arterial Blood HCO3


 33 mmol/L


(21-28) 


 


 





 


Arterial Blood Base Excess


 7 mmol/L


(-3-3) 


 


 





 


FiO2 60    


 


Potassium Level


 


 3.4 mmol/L


(3.5-5.1) 


 





 


Glucose (Fingerstick)


 


 


 135 mg/dL


(70-99) 261 mg/dL


(70-99)


 


Test


 3/20/20


23:15 3/21/20


06:07 3/21/20


06:10 3/21/20


07:33


 


Glucose (Fingerstick)


 290 mg/dL


(70-99) 239 mg/dL


(70-99) 


 





 


White Blood Count


 


 


 15.0 x10^3/uL


(4.0-11.0) 





 


Red Blood Count


 


 


 2.98 x10^6/uL


(3.50-5.40) 





 


Hemoglobin


 


 


 9.9 g/dL


(12.0-15.5) 





 


Hematocrit


 


 


 29.3 %


(36.0-47.0) 





 


Mean Corpuscular Volume   98 fL ()  


 


Mean Corpuscular Hemoglobin   33 pg (25-35)  


 


Mean Corpuscular Hemoglobin


Concent 


 


 34 g/dL


(31-37) 





 


Red Cell Distribution Width


 


 


 13.4 %


(11.5-14.5) 





 


Platelet Count


 


 


 164 x10^3/uL


(140-400) 





 


Sodium Level


 


 


 138 mmol/L


(136-145) 





 


Potassium Level


 


 


 3.5 mmol/L


(3.5-5.1) 





 


Chloride Level


 


 


 102 mmol/L


() 





 


Carbon Dioxide Level


 


 


 28 mmol/L


(21-32) 





 


Anion Gap   8 (6-14)  


 


Blood Urea Nitrogen


 


 


 39 mg/dL


(7-20) 





 


Creatinine


 


 


 4.8 mg/dL


(0.6-1.0) 





 


Estimated GFR


(Cockcroft-Gault) 


 


 9.6 


 





 


Glucose Level


 


 


 263 mg/dL


(70-99) 





 


Calcium Level


 


 


 6.6 mg/dL


(8.5-10.1) 





 


Phosphorus Level


 


 


 3.4 mg/dL


(2.6-4.7) 





 


Magnesium Level


 


 


 2.2 mg/dL


(1.8-2.4) 





 


O2 Saturation    97 % (92-99) 


 


Arterial Blood pH


 


 


 


 7.28


(7.35-7.45)


 


Arterial Blood pCO2 at


Patient Temp 


 


 


 54 mmHg


(35-46)


 


Arterial Blood pO2 at Patient


Temp 


 


 


 107 mmHg


()


 


Arterial Blood HCO3


 


 


 


 25 mmol/L


(21-28)


 


Arterial Blood Base Excess


 


 


 


 -2 mmol/L


(-3-3)


 


FiO2    40 








Microbiology


3/18/20 Blood Culture - Preliminary, Resulted


          NO GROWTH AFTER 3 DAYS


Medications





Current Medications


Sodium Chloride 1,000 ml @  1,000 mls/hr Q1H IV  Last administered on 3/16/20at 

03:00;  Start 3/16/20 at 03:00;  Stop 3/16/20 at 03:59;  Status DC


Ondansetron HCl (Zofran) 4 mg 1X  ONCE IVP  Last administered on 3/16/20at 

03:27;  Start 3/16/20 at 03:00;  Stop 3/16/20 at 03:01;  Status DC


Morphine Sulfate (Morphine Sulfate) 4 mg 1X  ONCE IV ;  Start 3/16/20 at 03:00; 

Stop 3/16/20 at 03:01;  Status Cancel


Ketorolac Tromethamine (Toradol 30mg Vial) 30 mg 1X  ONCE IV  Last administered 

on 3/16/20at 02:54;  Start 3/16/20 at 03:00;  Stop 3/16/20 at 03:01;  Status DC


Fentanyl Citrate (Fentanyl 2ml Vial) 25 mcg 1X  ONCE IVP  Last administered on 

3/16/20at 03:23;  Start 3/16/20 at 03:30;  Stop 3/16/20 at 03:31;  Status DC


Fentanyl Citrate (Fentanyl 2ml Vial) 100 mcg STK-MED ONCE .ROUTE ;  Start 

3/16/20 at 03:18;  Stop 3/16/20 at 03:18;  Status DC


Iohexol (Omnipaque 350 Mg/ml) 90 ml 1X  ONCE IV  Last administered on 3/16/20at 

03:25;  Start 3/16/20 at 03:30;  Stop 3/16/20 at 03:31;  Status DC


Info (CONTRAST GIVEN -- Rx MONITORING) 1 each PRN DAILY  PRN MC SEE COMMENTS;  

Start 3/16/20 at 03:30;  Stop 3/18/20 at 03:29;  Status DC


Hydromorphone HCl (Dilaudid) 0.5 mg 1X  ONCE IV  Last administered on 3/16/20at 

03:55;  Start 3/16/20 at 04:30;  Stop 3/16/20 at 04:32;  Status DC


Ondansetron HCl (Zofran) 4 mg PRN Q8HRS  PRN IV NAUSEA/VOMITING 1ST CHOICE;  

Start 3/16/20 at 05:00;  Stop 3/16/20 at 09:27;  Status DC


Morphine Sulfate (Morphine Sulfate) 2 mg PRN Q2HR  PRN IV SEVERE PAIN 7-10 Last 

administered on 3/17/20at 12:26;  Start 3/16/20 at 05:00;  Stop 3/17/20 at 

14:15;  Status DC


Sodium Chloride 1,000 ml @  125 mls/hr Q8H IV  Last administered on 3/16/20at 

20:56;  Start 3/16/20 at 05:00;  Stop 3/17/20 at 04:59;  Status DC


Hydromorphone HCl (Dilaudid) 0.5 mg PRN Q3HRS  PRN IV SEVERE PAIN 7-10 Last 

administered on 3/17/20at 10:06;  Start 3/16/20 at 05:00;  Stop 3/17/20 at 

12:01;  Status DC


Piperacillin Sod/ Tazobactam Sod 4.5 gm/Sodium Chloride 100 ml @  200 mls/hr 1X 

ONCE IV  Last administered on 3/16/20at 05:44;  Start 3/16/20 at 06:00;  Stop 

3/16/20 at 06:29;  Status DC


Ondansetron HCl (Zofran) 4 mg PRN Q4HRS  PRN IV NAUSEA/VOMITING 1ST CHOICE Last 

administered on 3/18/20at 23:56;  Start 3/16/20 at 09:30


Insulin Human Lispro (HumaLOG) 0-9 UNITS Q6HRS SQ  Last administered on 

3/21/20at 06:00;  Start 3/16/20 at 09:30


Dextrose (Dextrose 50%-Water Syringe) 12.5 gm PRN Q15MIN  PRN IV SEE COMMENTS;  

Start 3/16/20 at 09:30


Pantoprazole Sodium (PROTONIX VIAL for IV PUSH) 40 mg DAILYAC IVP  Last 

administered on 3/21/20at 07:43;  Start 3/16/20 at 11:30


Prochlorperazine Edisylate (Compazine) 10 mg PRN Q6HRS  PRN IV NAUSEA/VOMITING, 

2nd CHOICE Last administered on 3/17/20at 00:42;  Start 3/16/20 at 17:45


Atenolol (Tenormin) 100 mg DAILY PO ;  Start 3/17/20 at 09:00;  Stop 3/16/20 at 

20:08;  Status DC


Metoprolol Tartrate (Lopressor Vial) 2.5 mg Q6HRS IVP  Last administered on 

3/17/20at 05:51;  Start 3/16/20 at 20:15;  Stop 3/17/20 at 10:02;  Status DC


Metoprolol Tartrate (Lopressor Vial) 5 mg Q6HRS IVP  Last administered on 

3/21/20at 06:02;  Start 3/17/20 at 10:15


Hydromorphone HCl (Dilaudid) 1 mg PRN Q3HRS  PRN IV SEVERE PAIN 7-10 Last 

administered on 3/21/20at 09:40;  Start 3/17/20 at 12:00


Lidocaine HCl (Buffered Lidocaine 1%) 3 ml STK-MED ONCE .ROUTE ;  Start 3/17/20 

at 12:55;  Stop 3/17/20 at 12:56;  Status DC


Albumin Human 500 ml @  125 mls/hr 1X  ONCE IV  Last administered on 3/17/20at 

14:33;  Start 3/17/20 at 14:30;  Stop 3/17/20 at 18:32;  Status DC


Norepinephrine Bitartrate 8 mg/ Dextrose 258 ml @  17.299 mls/ hr CONT  PRN IV 

PER PROTOCOL Last administered on 3/20/20at 21:02;  Start 3/17/20 at 15:30


Sodium Chloride 1,000 ml @  125 mls/hr Q8H IV  Last administered on 3/17/20at 

21:04;  Start 3/17/20 at 16:00;  Stop 3/18/20 at 02:42;  Status DC


Albumin Human 500 ml @  125 mls/hr PRN BID  PRN IV After every 2L NSS & BP < 

90mm;  Start 3/17/20 at 16:00


Iohexol (Omnipaque 300 Mg/ml) 60 ml 1X  ONCE IV  Last administered on 3/17/20at 

17:20;  Start 3/17/20 at 17:00;  Stop 3/17/20 at 17:01;  Status DC


Info (CONTRAST GIVEN -- Rx MONITORING) 1 each PRN DAILY  PRN MC SEE COMMENTS;  

Start 3/17/20 at 17:00;  Stop 3/19/20 at 16:59;  Status DC


Meropenem 1 gm/ Sodium Chloride 100 ml @  200 mls/hr Q8HRS IV  Last administered

on 3/18/20at 05:45;  Start 3/17/20 at 20:00;  Stop 3/18/20 at 08:48;  Status DC


Furosemide (Lasix) 40 mg 1X  ONCE IVP  Last administered on 3/17/20at 22:12;  

Start 3/17/20 at 22:30;  Stop 3/17/20 at 22:31;  Status DC


Calcium Chloride 1000 mg/Sodium Chloride 110 ml @  220 mls/hr 1X  ONCE IV  Last 

administered on 3/17/20at 22:11;  Start 3/17/20 at 22:30;  Stop 3/17/20 at 

22:59;  Status DC


Albuterol Sulfate (Ventolin Neb Soln) 2.5 mg 1X  ONCE NEB  Last administered on 

3/18/20at 00:56;  Start 3/17/20 at 22:30;  Stop 3/17/20 at 22:31;  Status DC


Insulin Human Regular (HumuLIN R VIAL) 5 unit 1X  ONCE IV  Last administered on 

3/17/20at 22:14;  Start 3/17/20 at 22:30;  Stop 3/17/20 at 22:31;  Status DC


Magnesium Sulfate 50 ml @ 25 mls/hr 1X  ONCE IV  Last administered on 3/18/20at 

02:57;  Start 3/18/20 at 03:00;  Stop 3/18/20 at 04:59;  Status DC


Calcium Gluconate 1000 mg/Sodium Chloride 110 ml @  220 mls/hr 1X  ONCE IV  Last

administered on 3/18/20at 02:46;  Start 3/18/20 at 03:00;  Stop 3/18/20 at 

03:29;  Status DC


Sodium Chloride 1,000 ml @  200 mls/hr Q5H IV  Last administered on 3/18/20at 

02:46;  Start 3/18/20 at 03:00;  Stop 3/18/20 at 10:21;  Status DC


Calcium Gluconate 1000 mg/Sodium Chloride 110 ml @  220 mls/hr 1X  ONCE IV  Last

administered on 3/18/20at 03:21;  Start 3/18/20 at 03:30;  Stop 3/18/20 at 

03:59;  Status DC


Sodium Bicarbonate 50 meq/Sodium Chloride 1,050 ml @  75 mls/hr Q14H IV  Last 

administered on 3/20/20at 21:01;  Start 3/18/20 at 07:30


Calcium Gluconate 2000 mg/Sodium Chloride 120 ml @  220 mls/hr 1X  ONCE IV  Last

administered on 3/18/20at 09:05;  Start 3/18/20 at 07:30;  Stop 3/18/20 at 

08:02;  Status DC


Lidocaine HCl (Xylocaine-Mpf 1% 2ml Vial) 2 ml STK-MED ONCE .ROUTE ;  Start 

3/18/20 at 08:47;  Stop 3/18/20 at 08:47;  Status DC


Meropenem 500 mg/ Sodium Chloride 50 ml @  100 mls/hr Q12HR IV  Last 

administered on 3/20/20at 21:01;  Start 3/18/20 at 18:00


Lidocaine HCl (Buffered Lidocaine 1%) 3 ml STK-MED ONCE .ROUTE ;  Start 3/18/20 

at 09:46;  Stop 3/18/20 at 09:46;  Status DC


Lidocaine HCl (Buffered Lidocaine 1%) 6 ml 1X  ONCE INJ  Last administered on 

3/18/20at 10:26;  Start 3/18/20 at 10:15;  Stop 3/18/20 at 10:16;  Status DC


Info (Tpn Per Pharmacy) 1 each PRN DAILY  PRN MC SEE COMMENTS Last administered 

on 3/20/20at 12:35;  Start 3/18/20 at 12:00


Sodium Chloride 1,000 ml @  1,000 mls/hr Q1H PRN IV hypotension;  Start 3/18/20 

at 12:07;  Stop 3/18/20 at 18:06;  Status DC


Diphenhydramine HCl (Benadryl) 25 mg 1X PRN  PRN IV ITCHING;  Start 3/18/20 at 

12:15;  Stop 3/19/20 at 12:14;  Status DC


Diphenhydramine HCl (Benadryl) 25 mg 1X PRN  PRN IV ITCHING;  Start 3/18/20 at 

12:15;  Stop 3/19/20 at 12:14;  Status DC


Sodium Chloride 1,000 ml @  400 mls/hr Q2H30M PRN IV PATENCY;  Start 3/18/20 at 

12:07;  Stop 3/19/20 at 00:06;  Status DC


Info (PHARMACY MONITORING -- do not chart) 1 each PRN DAILY  PRN MC SEE 

COMMENTS;  Start 3/18/20 at 12:15;  Stop 3/20/20 at 08:13;  Status DC


Sodium Chloride 90 meq/Calcium Gluconate 10 meq/ Multivitamins 10 ml/Chromium/ 

Copper/Manganese/ Seleni/Zn 1 ml/ Total Parenteral Nutrition/Amino 

Acids/Dextrose/ Fat Emulsion Intravenous 55.005 ml  @ 2.292 mls/hr TPN  CONT IV 

;  Start 3/18/20 at 22:00;  Stop 3/18/20 at 12:33;  Status DC


Info (Tpn Per Pharmacy) 1 each PRN DAILY  PRN MC SEE COMMENTS;  Start 3/18/20 at

12:30;  Status UNV


Sodium Chloride 90 meq/Calcium Gluconate 10 meq/ Multivitamins 10 ml/Chromium/ 

Copper/Manganese/ Seleni/Zn 0.5 ml/ Total Parenteral Nutrition/Amino 

Acids/Dextrose/ Fat Emulsion Intravenous 1,512 ml @  63 mls/hr TPN  CONT IV  

Last administered on 3/18/20at 22:06;  Start 3/18/20 at 22:00;  Stop 3/19/20 at 

21:59;  Status DC


Calcium Carbonate/ Glycine (Tums) 500 mg PRN AFTMEALHC  PRN PO INDIGESTION;  

Start 3/18/20 at 17:45


Calcium Gluconate (Calcium Gluconate) 2,000 mg 1X  ONCE IVP  Last administered 

on 3/19/20at 02:19;  Start 3/19/20 at 02:15;  Stop 3/19/20 at 02:16;  Status DC


Calcium Chloride 3000 mg/Sodium Chloride 1,030 ml @  50 mls/hr [id number] IV  Last 

administered on 3/21/20at 02:17;  Start 3/19/20 at 08:00


Lorazepam (Ativan Inj) 1 mg PRN Q4HRS  PRN IVP ANXIETY / AGITATION Last 

administered on 3/21/20at 04:10;  Start 3/19/20 at 09:00


Sodium Chloride 1,000 ml @  1,000 mls/hr Q1H PRN IV hypotension;  Start 3/19/20 

at 08:56;  Stop 3/19/20 at 14:55;  Status DC


Albumin Human 200 ml @  200 mls/hr 1X PRN  PRN IV Hypotension;  Start 3/19/20 at

09:00;  Stop 3/19/20 at 14:59;  Status DC


Diphenhydramine HCl (Benadryl) 25 mg 1X PRN  PRN IV ITCHING;  Start 3/19/20 at 

09:00;  Stop 3/20/20 at 08:59;  Status DC


Diphenhydramine HCl (Benadryl) 25 mg 1X PRN  PRN IV ITCHING;  Start 3/19/20 at 

09:00;  Stop 3/20/20 at 08:59;  Status DC


Sodium Chloride 1,000 ml @  400 mls/hr Q2H30M PRN IV PATENCY;  Start 3/19/20 at 

08:56;  Stop 3/19/20 at 20:55;  Status DC


Info (PHARMACY MONITORING -- do not chart) 1 each PRN DAILY  PRN MC SEE 

COMMENTS;  Start 3/19/20 at 09:00;  Status UNV


Info (PHARMACY MONITORING -- do not chart) 1 each PRN DAILY  PRN MC SEE 

COMMENTS;  Start 3/19/20 at 09:00;  Stop 3/20/20 at 08:13;  Status DC


Digoxin (Lanoxin) 500 mcg 1X  ONCE IV  Last administered on 3/19/20at 10:04;  

Start 3/19/20 at 10:00;  Stop 3/19/20 at 10:01;  Status DC


Digoxin (Lanoxin) 125 mcg 1X  ONCE IV  Last administered on 3/19/20at 17:10;  

Start 3/19/20 at 18:00;  Stop 3/19/20 at 18:01;  Status DC


Magnesium Sulfate 100 ml @  25 mls/hr 1X  ONCE IV  Last administered on 

3/19/20at 12:48;  Start 3/19/20 at 13:00;  Stop 3/19/20 at 16:59;  Status DC


Sodium Chloride 90 meq/Magnesium Sulfate 10 meq/ Calcium Gluconate 20 meq/ 

Multivitamins 10 ml/Chromium/ Copper/Manganese/ Seleni/Zn 0.5 ml/ Total 

Parenteral Nutrition/Amino Acids/Dextrose/ Fat Emulsion Intravenous 1,512 ml @  

63 mls/hr TPN  CONT IV  Last administered on 3/19/20at 22:25;  Start 3/19/20 at 

22:00;  Stop 3/20/20 at 21:59;  Status DC


Sodium Chloride 1,000 ml @  1,000 mls/hr Q1H PRN IV hypotension;  Start 3/20/20 

at 08:05;  Stop 3/20/20 at 14:04;  Status DC


Albumin Human 200 ml @  200 mls/hr 1X  ONCE IV  Last administered on 3/20/20at 

08:57;  Start 3/20/20 at 08:15;  Stop 3/20/20 at 09:14;  Status DC


Diphenhydramine HCl (Benadryl) 25 mg 1X PRN  PRN IV ITCHING;  Start 3/20/20 at 

08:15;  Stop 3/21/20 at 08:14;  Status DC


Diphenhydramine HCl (Benadryl) 25 mg 1X PRN  PRN IV ITCHING;  Start 3/20/20 at 

08:15;  Stop 3/21/20 at 08:14;  Status DC


Sodium Chloride 1,000 ml @  400 mls/hr Q2H30M PRN IV PATENCY;  Start 3/20/20 at 

08:05;  Stop 3/20/20 at 20:04;  Status DC


Info (PHARMACY MONITORING -- do not chart) 1 each PRN DAILY  PRN MC SEE 

COMMENTS;  Start 3/20/20 at 08:15


Sodium Chloride 90 meq/Potassium Chloride 15 meq/ Potassium Phosphate 10 mmol/ 

Magnesium Sulfate 10 meq/Calcium Gluconate 20 meq/ Multivitamins 10 ml/Chromium/

Copper/Manganese/ Seleni/Zn 0.5 ml/ Total Parenteral Nutrition/Amino 

Acids/Dextrose/ Fat Emulsion Intravenous 1,512 ml @  63 mls/hr TPN  CONT IV  

Last administered on 3/20/20at 21:01;  Start 3/20/20 at 22:00;  Stop 3/21/20 at 

21:59


Potassium Chloride/Water 100 ml @  100 mls/hr 1X  ONCE IV  Last administered on 

3/20/20at 14:09;  Start 3/20/20 at 14:00;  Stop 3/20/20 at 14:59;  Status DC


Benzocaine (Hurricaine One) 1 spray 1X  ONCE MM  Last administered on 3/20/20at 

16:38;  Start 3/20/20 at 14:30;  Stop 3/20/20 at 14:31;  Status DC


Lidocaine HCl (Glydo (Lidocaine) Jelly) 1 thomas 1X  ONCE MM  Last administered on 

3/20/20at 16:38;  Start 3/20/20 at 14:30;  Stop 3/20/20 at 14:31;  Status DC


Linezolid/Dextrose 300 ml @  300 mls/hr Q12HR IV  Last administered on 3/20/20at

21:01;  Start 3/20/20 at 20:00


Acetaminophen (Tylenol) 650 mg PRN Q6HRS  PRN PO MILD PAIN / TEMP;  Start 

3/21/20 at 03:30;  Stop 3/21/20 at 03:36;  Status DC


Acetaminophen (Tylenol) 650 mg PRN Q6HRS  PRN PEG MILD PAIN / TEMP Last 

administered on 3/21/20at 04:10;  Start 3/21/20 at 03:36


Sodium Chloride 1,000 ml @  1,000 mls/hr Q1H PRN IV hypotension;  Start 3/21/20 

at 07:50;  Stop 3/21/20 at 13:49


Albumin Human 200 ml @  200 mls/hr 1X PRN  PRN IV Hypotension;  Start 3/21/20 at

08:00;  Stop 3/21/20 at 13:59


Sodium Chloride (Normal Saline Flush) 10 ml 1X PRN  PRN IV AP catheter pack;  

Start 3/21/20 at 08:00;  Stop 3/22/20 at 07:59


Sodium Chloride (Normal Saline Flush) 10 ml 1X PRN  PRN IV  catheter pack;  

Start 3/21/20 at 08:00;  Stop 3/22/20 at 07:59


Sodium Chloride 1,000 ml @  400 mls/hr Q2H30M PRN IV PATENCY;  Start 3/21/20 at 

07:50;  Stop 3/21/20 at 19:49


Info (PHARMACY MONITORING -- do not chart) 1 each PRN DAILY  PRN MC SEE 

COMMENTS;  Start 3/21/20 at 08:00;  Status UNV


Info (PHARMACY MONITORING -- do not chart) 1 each PRN DAILY  PRN MC SEE 

COMMENTS;  Start 3/21/20 at 08:00





Active Scripts


Active


Reported


Bisoprolol Fumarate 5 Mg Tablet 10 Mg PO DAILY


Vitals/I & O





Vital Sign - Last 24 Hours








 3/20/20 3/20/20 3/20/20 3/20/20





 12:00 12:00 12:00 12:20


 


Temp   98.2 





   98.2 


 


Pulse 115  145 


 


Resp   21 


 


B/P (MAP) 88/55  115/70 (85) 


 


Pulse Ox   98 91


 


O2 Delivery  Bi-pap BiPAP/CPAP BiPAP/CPAP


 


    





    





 3/20/20 3/20/20 3/20/20 3/20/20





 13:00 13:45 14:00 14:00


 


Temp 100.4  100.3 





 100.4  100.3 


 


Pulse 130  130 


 


Resp 26 26 18 


 


B/P (MAP) 130/62 (84)  113/56 (75) 


 


Pulse Ox 98 98 100 93


 


O2 Delivery BiPAP/CPAP  BiPAP/CPAP BiPAP/CPAP


 


    





    





 3/20/20 3/20/20 3/20/20 3/20/20





 14:15 15:00 16:03 16:05


 


Temp   100.9 





   100.9 


 


Pulse  128 128 


 


Resp 17 17 17 


 


B/P (MAP)  105/55 (72) 107/57 (74) 


 


Pulse Ox 100 100 100 


 


O2 Delivery BiPAP/CPAP BiPAP/CPAP BiPAP/CPAP Bi-pap


 


    





    





 3/20/20 3/20/20 3/20/20 3/20/20





 16:14 16:37 17:00 17:07


 


Temp   100.7 





   100.7 


 


Pulse   135 


 


Resp  17 21 20


 


B/P (MAP)   106/58 (74) 


 


Pulse Ox 100 100 99 99


 


O2 Delivery BiPAP/CPAP BiPAP/CPAP BiPAP/CPAP BiPAP/CPAP


 


    





    





 3/20/20 3/20/20 3/20/20 3/20/20





 18:00 18:00 18:23 19:00


 


Temp  100.2  





  100.2  


 


Pulse 130 128  137


 


Resp  20  24


 


B/P (MAP) 102/74 102/53 (69)  96/48 (64)


 


Pulse Ox  99 100 98


 


O2 Delivery  BiPAP/CPAP BiPAP/CPAP BiPAP/CPAP


 


    





    





 3/20/20 3/20/20 3/20/20 3/20/20





 19:31 20:00 20:00 20:00


 


Temp   100.8 





   100.8 


 


Pulse   135 


 


Resp 24  24 


 


B/P (MAP)   106/59 (75) 


 


Pulse Ox 100  99 


 


O2 Delivery BiPAP/CPAP  BiPAP/CPAP Bi-pap


 


    





    





 3/20/20 3/20/20 3/20/20 3/20/20





 20:30 20:31 21:00 22:00


 


Pulse   132 141


 


Resp  20 20 25


 


B/P (MAP)   114/81 (92) 125/71 (89)


 


Pulse Ox 100 98 97 97


 


O2 Delivery BiPAP/CPAP BiPAP/CPAP BiPAP/CPAP BiPAP/CPAP





 3/20/20 3/20/20 3/20/20 3/20/20





 23:00 23:12 23:12 23:46


 


Pulse 114 141  


 


Resp 14  24 14


 


B/P (MAP) 104/56 (72) 104/59  


 


Pulse Ox 97  97 95


 


O2 Delivery BiPAP/CPAP  BiPAP/CPAP BiPAP/CPAP





 3/20/20 3/21/20 3/21/20 3/21/20





 23:55 00:00 00:00 00:00


 


Temp  100.5  





  100.5  


 


Pulse  124  


 


Resp  15  


 


B/P (MAP)  89/57 (68)  


 


Pulse Ox 96 97  


 


O2 Delivery BiPAP/CPAP BiPAP/CPAP  Bi-pap


 


    





    





 3/21/20 3/21/20 3/21/20 3/21/20





 01:00 01:36 02:00 02:17


 


Pulse 124  129 


 


Resp 21  24 24


 


B/P (MAP) 99/61 (74)  102/58 (73) 


 


Pulse Ox 99 96 96 96


 


O2 Delivery BiPAP/CPAP BiPAP/CPAP BiPAP/CPAP BiPAP/CPAP





 3/21/20 3/21/20 3/21/20 3/21/20





 02:50 03:00 04:00 04:00


 


Temp   101.7 





   101.7 


 


Pulse  134 139 


 


Resp 17 16 26 


 


B/P (MAP)  94/59 (71) 112/66 (81) 


 


Pulse Ox 97 96 94 


 


O2 Delivery BiPAP/CPAP BiPAP/CPAP BiPAP/CPAP 


 


    





    





 3/21/20 3/21/20 3/21/20 3/21/20





 04:00 04:05 05:00 06:00


 


Pulse   134 105


 


Resp   16 22


 


B/P (MAP)   95/58 (70) 90/55 (67)


 


Pulse Ox  97 96 95


 


O2 Delivery Bi-pap BiPAP/CPAP BiPAP/CPAP BiPAP/CPAP





 3/21/20 3/21/20 3/21/20 3/21/20





 06:02 06:02 06:38 07:00


 


Pulse 125   113


 


Resp  20 14 13


 


B/P (MAP) 109/63   96/59 (71)


 


Pulse Ox  99 99 100


 


O2 Delivery  BiPAP/CPAP Ventilator BiPAP/CPAP





 3/21/20 3/21/20 3/21/20 3/21/20





 07:23 07:59 08:00 08:00


 


Temp    98.9





    98.9


 


Pulse   113 113


 


Resp    14


 


B/P (MAP)   103/59 (74) 103/59 (74)


 


Pulse Ox 97   100


 


O2 Delivery BiPAP/CPAP Bi-pap  BiPAP/CPAP


 


    





    





 3/21/20 3/21/20 3/21/20 





 08:55 09:00 09:40 


 


Pulse  112  


 


Resp  17 28 


 


B/P (MAP)  111/57 (75)  


 


Pulse Ox 98 98 98 


 


O2 Delivery BiPAP/CPAP BiPAP/CPAP  














Intake and Output   


 


 3/20/20 3/20/20 3/21/20





 15:00 23:00 07:00


 


Intake Total 300 ml 3275.3 ml 1280.1 ml


 


Output Total 20 ml 920 ml 40 ml


 


Balance 280 ml 2355.3 ml 1240.1 ml











Hemodynamically unstable?:  Yes


Is patient in severe pain?:  Yes


Is NPO status required?:  Yes











GAETANO GONCALVES MD        Mar 21, 2020 11:11

## 2020-03-21 NOTE — PDOC
G I PROGRESS NOTE


Subjective


Moaning.  Lethargic.  Did not communicate.


Physical Exam


Lungs with occ exp wheeze.


RRR, tachy.


Abdomen doughy, no bowel sounds heard.


Review of Relevant


I have reviewed the following items josy (where applicable) has been applied.


Labs





Laboratory Tests








Test


 3/19/20


16:45 3/19/20


17:07 3/20/20


00:21 3/20/20


06:00


 


O2 Saturation 97 % (92-99)    


 


Arterial Blood pH


 7.46


(7.35-7.45) 


 


 





 


Arterial Blood pCO2 at


Patient Temp 33 mmHg


(35-46) 


 


 





 


Arterial Blood pO2 at Patient


Temp 88 mmHg


() 


 


 





 


Arterial Blood HCO3


 23 mmol/L


(21-28) 


 


 





 


Arterial Blood Base Excess


 0 mmol/L


(-3-3) 


 


 





 


FiO2 60    


 


Glucose (Fingerstick)


 


 237 mg/dL


(70-99) 259 mg/dL


(70-99) 





 


White Blood Count


 


 


 


 16.8 x10^3/uL


(4.0-11.0)


 


Red Blood Count


 


 


 


 3.26 x10^6/uL


(3.50-5.40)


 


Hemoglobin


 


 


 


 10.8 g/dL


(12.0-15.5)


 


Hematocrit


 


 


 


 32.0 %


(36.0-47.0)


 


Mean Corpuscular Volume    98 fL () 


 


Mean Corpuscular Hemoglobin    33 pg (25-35) 


 


Mean Corpuscular Hemoglobin


Concent 


 


 


 34 g/dL


(31-37)


 


Red Cell Distribution Width


 


 


 


 13.4 %


(11.5-14.5)


 


Platelet Count


 


 


 


 141 x10^3/uL


(140-400)


 


Neutrophils (%) (Auto)    91 % (31-73) 


 


Lymphocytes (%) (Auto)    5 % (24-48) 


 


Monocytes (%) (Auto)    4 % (0-9) 


 


Eosinophils (%) (Auto)    0 % (0-3) 


 


Basophils (%) (Auto)    0 % (0-3) 


 


Neutrophils # (Auto)


 


 


 


 15.3 x10^3/uL


(1.8-7.7)


 


Lymphocytes # (Auto)


 


 


 


 0.8 x10^3/uL


(1.0-4.8)


 


Monocytes # (Auto)


 


 


 


 0.7 x10^3/uL


(0.0-1.1)


 


Eosinophils # (Auto)


 


 


 


 0.1 x10^3/uL


(0.0-0.7)


 


Basophils # (Auto)


 


 


 


 0.0 x10^3/uL


(0.0-0.2)


 


Sodium Level


 


 


 


 139 mmol/L


(136-145)


 


Potassium Level


 


 


 


 2.4 mmol/L


(3.5-5.1)


 


Chloride Level


 


 


 


 103 mmol/L


()


 


Carbon Dioxide Level


 


 


 


 22 mmol/L


(21-32)


 


Anion Gap    14 (6-14) 


 


Blood Urea Nitrogen


 


 


 


 41 mg/dL


(7-20)


 


Creatinine


 


 


 


 5.0 mg/dL


(0.6-1.0)


 


Estimated GFR


(Cockcroft-Gault) 


 


 


 9.2 





 


BUN/Creatinine Ratio    8 (6-20) 


 


Glucose Level


 


 


 


 219 mg/dL


(70-99)


 


Calcium Level


 


 


 


 < 5.0 mg/dL


(8.5-10.1)


 


Phosphorus Level


 


 


 


 2.5 mg/dL


(2.6-4.7)


 


Magnesium Level


 


 


 


 1.9 mg/dL


(1.8-2.4)


 


Total Bilirubin


 


 


 


 0.5 mg/dL


(0.2-1.0)


 


Aspartate Amino Transf


(AST/SGOT) 


 


 


 56 U/L (15-37) 





 


Alanine Aminotransferase


(ALT/SGPT) 


 


 


 45 U/L (14-59) 





 


Alkaline Phosphatase


 


 


 


 55 U/L


()


 


Total Protein


 


 


 


 3.8 g/dL


(6.4-8.2)


 


Albumin


 


 


 


 1.0 g/dL


(3.4-5.0)


 


Albumin/Globulin Ratio    0.4 (1.0-1.7) 


 


Triglycerides Level


 


 


 


 214 mg/dL


(0-150)


 


Test


 3/20/20


06:04 3/20/20


06:45 3/20/20


08:00 3/20/20


11:25


 


Glucose (Fingerstick)


 218 mg/dL


(70-99) 


 


 





 


Ionized Calcium


 


 < 0.25 mmol/L


(1.13-1.32) 


 





 


O2 Saturation   84 % (92-99)  99 % (92-99) 


 


Arterial Blood pH


 


 


 7.36


(7.35-7.45) 7.42


(7.35-7.45)


 


Arterial Blood pCO2 at


Patient Temp 


 


 43 mmHg


(35-46) 53 mmHg


(35-46)


 


Arterial Blood pO2 at Patient


Temp 


 


 51 mmHg


() 136 mmHg


()


 


Arterial Blood HCO3


 


 


 24 mmol/L


(21-28) 33 mmol/L


(21-28)


 


Arterial Blood Base Excess


 


 


 -2 mmol/L


(-3-3) 7 mmol/L


(-3-3)


 


FiO2   Bipap 60%  60 


 


Test


 3/20/20


11:30 3/20/20


12:21 3/20/20


17:46 3/20/20


23:15


 


Potassium Level


 3.4 mmol/L


(3.5-5.1) 


 


 





 


Glucose (Fingerstick)


 


 135 mg/dL


(70-99) 261 mg/dL


(70-99) 290 mg/dL


(70-99)


 


Test


 3/21/20


06:07 3/21/20


06:10 3/21/20


07:33 3/21/20


12:13


 


Glucose (Fingerstick)


 239 mg/dL


(70-99) 


 


 137 mg/dL


(70-99)


 


White Blood Count


 


 15.0 x10^3/uL


(4.0-11.0) 


 





 


Red Blood Count


 


 2.98 x10^6/uL


(3.50-5.40) 


 





 


Hemoglobin


 


 9.9 g/dL


(12.0-15.5) 


 





 


Hematocrit


 


 29.3 %


(36.0-47.0) 


 





 


Mean Corpuscular Volume  98 fL ()   


 


Mean Corpuscular Hemoglobin  33 pg (25-35)   


 


Mean Corpuscular Hemoglobin


Concent 


 34 g/dL


(31-37) 


 





 


Red Cell Distribution Width


 


 13.4 %


(11.5-14.5) 


 





 


Platelet Count


 


 164 x10^3/uL


(140-400) 


 





 


Sodium Level


 


 138 mmol/L


(136-145) 


 





 


Potassium Level


 


 3.5 mmol/L


(3.5-5.1) 


 





 


Chloride Level


 


 102 mmol/L


() 


 





 


Carbon Dioxide Level


 


 28 mmol/L


(21-32) 


 





 


Anion Gap  8 (6-14)   


 


Blood Urea Nitrogen


 


 39 mg/dL


(7-20) 


 





 


Creatinine


 


 4.8 mg/dL


(0.6-1.0) 


 





 


Estimated GFR


(Cockcroft-Gault) 


 9.6 


 


 





 


Glucose Level


 


 263 mg/dL


(70-99) 


 





 


Calcium Level


 


 6.6 mg/dL


(8.5-10.1) 


 





 


Phosphorus Level


 


 3.4 mg/dL


(2.6-4.7) 


 





 


Magnesium Level


 


 2.2 mg/dL


(1.8-2.4) 


 





 


O2 Saturation   97 % (92-99)  


 


Arterial Blood pH


 


 


 7.28


(7.35-7.45) 





 


Arterial Blood pCO2 at


Patient Temp 


 


 54 mmHg


(35-46) 





 


Arterial Blood pO2 at Patient


Temp 


 


 107 mmHg


() 





 


Arterial Blood HCO3


 


 


 25 mmol/L


(21-28) 





 


Arterial Blood Base Excess


 


 


 -2 mmol/L


(-3-3) 





 


FiO2   40  








Laboratory Tests








Test


 3/20/20


17:46 3/20/20


23:15 3/21/20


06:07 3/21/20


06:10


 


Glucose (Fingerstick)


 261 mg/dL


(70-99) 290 mg/dL


(70-99) 239 mg/dL


(70-99) 





 


White Blood Count


 


 


 


 15.0 x10^3/uL


(4.0-11.0)


 


Red Blood Count


 


 


 


 2.98 x10^6/uL


(3.50-5.40)


 


Hemoglobin


 


 


 


 9.9 g/dL


(12.0-15.5)


 


Hematocrit


 


 


 


 29.3 %


(36.0-47.0)


 


Mean Corpuscular Volume    98 fL () 


 


Mean Corpuscular Hemoglobin    33 pg (25-35) 


 


Mean Corpuscular Hemoglobin


Concent 


 


 


 34 g/dL


(31-37)


 


Red Cell Distribution Width


 


 


 


 13.4 %


(11.5-14.5)


 


Platelet Count


 


 


 


 164 x10^3/uL


(140-400)


 


Sodium Level


 


 


 


 138 mmol/L


(136-145)


 


Potassium Level


 


 


 


 3.5 mmol/L


(3.5-5.1)


 


Chloride Level


 


 


 


 102 mmol/L


()


 


Carbon Dioxide Level


 


 


 


 28 mmol/L


(21-32)


 


Anion Gap    8 (6-14) 


 


Blood Urea Nitrogen


 


 


 


 39 mg/dL


(7-20)


 


Creatinine


 


 


 


 4.8 mg/dL


(0.6-1.0)


 


Estimated GFR


(Cockcroft-Gault) 


 


 


 9.6 





 


Glucose Level


 


 


 


 263 mg/dL


(70-99)


 


Calcium Level


 


 


 


 6.6 mg/dL


(8.5-10.1)


 


Phosphorus Level


 


 


 


 3.4 mg/dL


(2.6-4.7)


 


Magnesium Level


 


 


 


 2.2 mg/dL


(1.8-2.4)


 


Test


 3/21/20


07:33 3/21/20


12:13 


 





 


O2 Saturation 97 % (92-99)    


 


Arterial Blood pH


 7.28


(7.35-7.45) 


 


 





 


Arterial Blood pCO2 at


Patient Temp 54 mmHg


(35-46) 


 


 





 


Arterial Blood pO2 at Patient


Temp 107 mmHg


() 


 


 





 


Arterial Blood HCO3


 25 mmol/L


(21-28) 


 


 





 


Arterial Blood Base Excess


 -2 mmol/L


(-3-3) 


 


 





 


FiO2 40    


 


Glucose (Fingerstick)


 


 137 mg/dL


(70-99) 


 











Microbiology


3/18/20 Blood Culture - Preliminary, Resulted


          NO GROWTH AFTER 3 DAYS


Vitals/I & O





Vital Sign - Last 24 Hours








 3/20/20 3/20/20 3/20/20 3/20/20





 13:00 13:45 14:00 14:00


 


Temp 100.4  100.3 





 100.4  100.3 


 


Pulse 130  130 


 


Resp 26 26 18 


 


B/P (MAP) 130/62 (84)  113/56 (75) 


 


Pulse Ox 98 98 100 93


 


O2 Delivery BiPAP/CPAP  BiPAP/CPAP BiPAP/CPAP


 


    





    





 3/20/20 3/20/20 3/20/20 3/20/20





 14:15 15:00 16:03 16:05


 


Temp   100.9 





   100.9 


 


Pulse  128 128 


 


Resp 17 17 17 


 


B/P (MAP)  105/55 (72) 107/57 (74) 


 


Pulse Ox 100 100 100 


 


O2 Delivery BiPAP/CPAP BiPAP/CPAP BiPAP/CPAP Bi-pap


 


    





    





 3/20/20 3/20/20 3/20/20 3/20/20





 16:14 16:37 17:00 17:07


 


Temp   100.7 





   100.7 


 


Pulse   135 


 


Resp  17 21 20


 


B/P (MAP)   106/58 (74) 


 


Pulse Ox 100 100 99 99


 


O2 Delivery BiPAP/CPAP BiPAP/CPAP BiPAP/CPAP BiPAP/CPAP


 


    





    





 3/20/20 3/20/20 3/20/20 3/20/20





 18:00 18:00 18:23 19:00


 


Temp  100.2  





  100.2  


 


Pulse 130 128  137


 


Resp  20  24


 


B/P (MAP) 102/74 102/53 (69)  96/48 (64)


 


Pulse Ox  99 100 98


 


O2 Delivery  BiPAP/CPAP BiPAP/CPAP BiPAP/CPAP


 


    





    





 3/20/20 3/20/20 3/20/20 3/20/20





 19:31 20:00 20:00 20:00


 


Temp   100.8 





   100.8 


 


Pulse   135 


 


Resp 24  24 


 


B/P (MAP)   106/59 (75) 


 


Pulse Ox 100  99 


 


O2 Delivery BiPAP/CPAP  BiPAP/CPAP Bi-pap


 


    





    





 3/20/20 3/20/20 3/20/20 3/20/20





 20:30 20:31 21:00 22:00


 


Pulse   132 141


 


Resp  20 20 25


 


B/P (MAP)   114/81 (92) 125/71 (89)


 


Pulse Ox 100 98 97 97


 


O2 Delivery BiPAP/CPAP BiPAP/CPAP BiPAP/CPAP BiPAP/CPAP





 3/20/20 3/20/20 3/20/20 3/20/20





 23:00 23:12 23:12 23:46


 


Pulse 114 141  


 


Resp 14  24 14


 


B/P (MAP) 104/56 (72) 104/59  


 


Pulse Ox 97  97 95


 


O2 Delivery BiPAP/CPAP  BiPAP/CPAP BiPAP/CPAP





 3/20/20 3/21/20 3/21/20 3/21/20





 23:55 00:00 00:00 00:00


 


Temp  100.5  





  100.5  


 


Pulse  124  


 


Resp  15  


 


B/P (MAP)  89/57 (68)  


 


Pulse Ox 96 97  


 


O2 Delivery BiPAP/CPAP BiPAP/CPAP  Bi-pap


 


    





    





 3/21/20 3/21/20 3/21/20 3/21/20





 01:00 01:36 02:00 02:17


 


Pulse 124  129 


 


Resp 21  24 24


 


B/P (MAP) 99/61 (74)  102/58 (73) 


 


Pulse Ox 99 96 96 96


 


O2 Delivery BiPAP/CPAP BiPAP/CPAP BiPAP/CPAP BiPAP/CPAP





 3/21/20 3/21/20 3/21/20 3/21/20





 02:50 03:00 04:00 04:00


 


Temp   101.7 





   101.7 


 


Pulse  134 139 


 


Resp 17 16 26 


 


B/P (MAP)  94/59 (71) 112/66 (81) 


 


Pulse Ox 97 96 94 


 


O2 Delivery BiPAP/CPAP BiPAP/CPAP BiPAP/CPAP 


 


    





    





 3/21/20 3/21/20 3/21/20 3/21/20





 04:00 04:05 05:00 06:00


 


Pulse   134 105


 


Resp   16 22


 


B/P (MAP)   95/58 (70) 90/55 (67)


 


Pulse Ox  97 96 95


 


O2 Delivery Bi-pap BiPAP/CPAP BiPAP/CPAP BiPAP/CPAP





 3/21/20 3/21/20 3/21/20 3/21/20





 06:02 06:02 06:38 07:00


 


Pulse 125   113


 


Resp  20 14 13


 


B/P (MAP) 109/63   96/59 (71)


 


Pulse Ox  99 99 100


 


O2 Delivery  BiPAP/CPAP Ventilator BiPAP/CPAP





 3/21/20 3/21/20 3/21/20 3/21/20





 07:23 07:59 08:00 08:00


 


Temp    98.9





    98.9


 


Pulse   113 113


 


Resp    14


 


B/P (MAP)   103/59 (74) 103/59 (74)


 


Pulse Ox 97   100


 


O2 Delivery BiPAP/CPAP Bi-pap  BiPAP/CPAP


 


    





    





 3/21/20 3/21/20 3/21/20 3/21/20





 08:55 09:00 09:40 10:00


 


Pulse  112  124


 


Resp  17 28 17


 


B/P (MAP)  111/57 (75)  119/65 (83)


 


Pulse Ox 98 98 98 98


 


O2 Delivery BiPAP/CPAP BiPAP/CPAP  BiPAP/CPAP





 3/21/20 3/21/20 3/21/20 3/21/20





 10:10 11:00 11:18 12:00


 


Pulse  137  139


 


Resp 17 28  


 


B/P (MAP)  124/61 (82)  132/53 (79)


 


Pulse Ox 98 97 98 


 


O2 Delivery BiPAP/CPAP BiPAP/CPAP BiPAP/CPAP 





 3/21/20 3/21/20  





 12:00 12:00  


 


Temp  99.0  





  99.0  


 


Pulse  137  


 


Resp  28  


 


B/P (MAP)  124/61 (82)  


 


Pulse Ox  97  


 


O2 Delivery Bi-pap BiPAP/CPAP  














Intake and Output   


 


 3/20/20 3/20/20 3/21/20





 15:00 23:00 07:00


 


Intake Total 300 ml 3275.3 ml 1280.1 ml


 


Output Total 20 ml 920 ml 40 ml


 


Balance 280 ml 2355.3 ml 1240.1 ml








Problem List


Problems


Medical Problems:


(1) Acute pancreatitis


Status: Acute  





(2) Cholelithiasis


Status: Acute  





Assessment


Biliary pancreatitis, necrotizing, with MOSF


Plan of Care Note


Continue support.


Repeat CT next week.





Hemodynamically unstable?:  Yes


Is patient in severe pain?:  Yes


Is NPO status required?:  Yes











MARCO ANTONIO LONGO MD          Mar 21, 2020 12:27

## 2020-03-21 NOTE — PDOC
SURGICAL PROGRESS NOTE


Subjective


BIPAP on 


sleepy with IV pain meds


Vital Signs





Vital Signs








  Date Time  Temp Pulse Resp B/P (MAP) Pulse Ox O2 Delivery O2 Flow Rate FiO2


 


3/21/20 09:40   28  98   


 


3/21/20 09:00  112  111/57 (75)  BiPAP/CPAP  


 


3/21/20 08:00 98.9       





 98.9       








I&O











Intake and Output 


 


 3/21/20





 07:00


 


Intake Total 4855.4 ml


 


Output Total 980 ml


 


Balance 3875.4 ml


 


 


 


Intake Oral 0 ml


 


IV Total 4855.4 ml


 


Output Urine Total 80 ml


 


Stool Total 900 ml








Abdomen:  Other (obese,soft)


Labs





Laboratory Tests








Test


 3/19/20


11:25 3/19/20


11:26 3/19/20


16:45 3/19/20


17:07


 


O2 Saturation 96 % (92-99)   97 % (92-99)  


 


Arterial Blood pH


 7.40


(7.35-7.45) 


 7.46


(7.35-7.45) 





 


Arterial Blood pCO2 at


Patient Temp 40 mmHg


(35-46) 


 33 mmHg


(35-46) 





 


Arterial Blood pO2 at Patient


Temp 84 mmHg


() 


 88 mmHg


() 





 


Arterial Blood HCO3


 25 mmol/L


(21-28) 


 23 mmol/L


(21-28) 





 


Arterial Blood Base Excess


 0 mmol/L


(-3-3) 


 0 mmol/L


(-3-3) 





 


FiO2 45   60  


 


Glucose (Fingerstick)


 


 176 mg/dL


(70-99) 


 237 mg/dL


(70-99)


 


Test


 3/20/20


00:21 3/20/20


06:00 3/20/20


06:04 3/20/20


06:45


 


Glucose (Fingerstick)


 259 mg/dL


(70-99) 


 218 mg/dL


(70-99) 





 


White Blood Count


 


 16.8 x10^3/uL


(4.0-11.0) 


 





 


Red Blood Count


 


 3.26 x10^6/uL


(3.50-5.40) 


 





 


Hemoglobin


 


 10.8 g/dL


(12.0-15.5) 


 





 


Hematocrit


 


 32.0 %


(36.0-47.0) 


 





 


Mean Corpuscular Volume  98 fL ()   


 


Mean Corpuscular Hemoglobin  33 pg (25-35)   


 


Mean Corpuscular Hemoglobin


Concent 


 34 g/dL


(31-37) 


 





 


Red Cell Distribution Width


 


 13.4 %


(11.5-14.5) 


 





 


Platelet Count


 


 141 x10^3/uL


(140-400) 


 





 


Neutrophils (%) (Auto)  91 % (31-73)   


 


Lymphocytes (%) (Auto)  5 % (24-48)   


 


Monocytes (%) (Auto)  4 % (0-9)   


 


Eosinophils (%) (Auto)  0 % (0-3)   


 


Basophils (%) (Auto)  0 % (0-3)   


 


Neutrophils # (Auto)


 


 15.3 x10^3/uL


(1.8-7.7) 


 





 


Lymphocytes # (Auto)


 


 0.8 x10^3/uL


(1.0-4.8) 


 





 


Monocytes # (Auto)


 


 0.7 x10^3/uL


(0.0-1.1) 


 





 


Eosinophils # (Auto)


 


 0.1 x10^3/uL


(0.0-0.7) 


 





 


Basophils # (Auto)


 


 0.0 x10^3/uL


(0.0-0.2) 


 





 


Sodium Level


 


 139 mmol/L


(136-145) 


 





 


Potassium Level


 


 2.4 mmol/L


(3.5-5.1) 


 





 


Chloride Level


 


 103 mmol/L


() 


 





 


Carbon Dioxide Level


 


 22 mmol/L


(21-32) 


 





 


Anion Gap  14 (6-14)   


 


Blood Urea Nitrogen


 


 41 mg/dL


(7-20) 


 





 


Creatinine


 


 5.0 mg/dL


(0.6-1.0) 


 





 


Estimated GFR


(Cockcroft-Gault) 


 9.2 


 


 





 


BUN/Creatinine Ratio  8 (6-20)   


 


Glucose Level


 


 219 mg/dL


(70-99) 


 





 


Calcium Level


 


 < 5.0 mg/dL


(8.5-10.1) 


 





 


Phosphorus Level


 


 2.5 mg/dL


(2.6-4.7) 


 





 


Magnesium Level


 


 1.9 mg/dL


(1.8-2.4) 


 





 


Total Bilirubin


 


 0.5 mg/dL


(0.2-1.0) 


 





 


Aspartate Amino Transf


(AST/SGOT) 


 56 U/L (15-37) 


 


 





 


Alanine Aminotransferase


(ALT/SGPT) 


 45 U/L (14-59) 


 


 





 


Alkaline Phosphatase


 


 55 U/L


() 


 





 


Total Protein


 


 3.8 g/dL


(6.4-8.2) 


 





 


Albumin


 


 1.0 g/dL


(3.4-5.0) 


 





 


Albumin/Globulin Ratio  0.4 (1.0-1.7)   


 


Triglycerides Level


 


 214 mg/dL


(0-150) 


 





 


Ionized Calcium


 


 


 


 < 0.25 mmol/L


(1.13-1.32)


 


Test


 3/20/20


08:00 3/20/20


11:25 3/20/20


11:30 3/20/20


12:21


 


O2 Saturation 84 % (92-99)  99 % (92-99)   


 


Arterial Blood pH


 7.36


(7.35-7.45) 7.42


(7.35-7.45) 


 





 


Arterial Blood pCO2 at


Patient Temp 43 mmHg


(35-46) 53 mmHg


(35-46) 


 





 


Arterial Blood pO2 at Patient


Temp 51 mmHg


() 136 mmHg


() 


 





 


Arterial Blood HCO3


 24 mmol/L


(21-28) 33 mmol/L


(21-28) 


 





 


Arterial Blood Base Excess


 -2 mmol/L


(-3-3) 7 mmol/L


(-3-3) 


 





 


FiO2 Bipap 60%  60   


 


Potassium Level


 


 


 3.4 mmol/L


(3.5-5.1) 





 


Glucose (Fingerstick)


 


 


 


 135 mg/dL


(70-99)


 


Test


 3/20/20


17:46 3/20/20


23:15 3/21/20


06:07 3/21/20


06:10


 


Glucose (Fingerstick)


 261 mg/dL


(70-99) 290 mg/dL


(70-99) 239 mg/dL


(70-99) 





 


White Blood Count


 


 


 


 15.0 x10^3/uL


(4.0-11.0)


 


Red Blood Count


 


 


 


 2.98 x10^6/uL


(3.50-5.40)


 


Hemoglobin


 


 


 


 9.9 g/dL


(12.0-15.5)


 


Hematocrit


 


 


 


 29.3 %


(36.0-47.0)


 


Mean Corpuscular Volume    98 fL () 


 


Mean Corpuscular Hemoglobin    33 pg (25-35) 


 


Mean Corpuscular Hemoglobin


Concent 


 


 


 34 g/dL


(31-37)


 


Red Cell Distribution Width


 


 


 


 13.4 %


(11.5-14.5)


 


Platelet Count


 


 


 


 164 x10^3/uL


(140-400)


 


Sodium Level


 


 


 


 138 mmol/L


(136-145)


 


Potassium Level


 


 


 


 3.5 mmol/L


(3.5-5.1)


 


Chloride Level


 


 


 


 102 mmol/L


()


 


Carbon Dioxide Level


 


 


 


 28 mmol/L


(21-32)


 


Anion Gap    8 (6-14) 


 


Blood Urea Nitrogen


 


 


 


 39 mg/dL


(7-20)


 


Creatinine


 


 


 


 4.8 mg/dL


(0.6-1.0)


 


Estimated GFR


(Cockcroft-Gault) 


 


 


 9.6 





 


Glucose Level


 


 


 


 263 mg/dL


(70-99)


 


Calcium Level


 


 


 


 6.6 mg/dL


(8.5-10.1)


 


Phosphorus Level


 


 


 


 3.4 mg/dL


(2.6-4.7)


 


Magnesium Level


 


 


 


 2.2 mg/dL


(1.8-2.4)


 


Test


 3/21/20


07:33 


 


 





 


O2 Saturation 97 % (92-99)    


 


Arterial Blood pH


 7.28


(7.35-7.45) 


 


 





 


Arterial Blood pCO2 at


Patient Temp 54 mmHg


(35-46) 


 


 





 


Arterial Blood pO2 at Patient


Temp 107 mmHg


() 


 


 





 


Arterial Blood HCO3


 25 mmol/L


(21-28) 


 


 





 


Arterial Blood Base Excess


 -2 mmol/L


(-3-3) 


 


 





 


FiO2 40    








Laboratory Tests








Test


 3/20/20


11:25 3/20/20


11:30 3/20/20


12:21 3/20/20


17:46


 


O2 Saturation 99 % (92-99)    


 


Arterial Blood pH


 7.42


(7.35-7.45) 


 


 





 


Arterial Blood pCO2 at


Patient Temp 53 mmHg


(35-46) 


 


 





 


Arterial Blood pO2 at Patient


Temp 136 mmHg


() 


 


 





 


Arterial Blood HCO3


 33 mmol/L


(21-28) 


 


 





 


Arterial Blood Base Excess


 7 mmol/L


(-3-3) 


 


 





 


FiO2 60    


 


Potassium Level


 


 3.4 mmol/L


(3.5-5.1) 


 





 


Glucose (Fingerstick)


 


 


 135 mg/dL


(70-99) 261 mg/dL


(70-99)


 


Test


 3/20/20


23:15 3/21/20


06:07 3/21/20


06:10 3/21/20


07:33


 


Glucose (Fingerstick)


 290 mg/dL


(70-99) 239 mg/dL


(70-99) 


 





 


White Blood Count


 


 


 15.0 x10^3/uL


(4.0-11.0) 





 


Red Blood Count


 


 


 2.98 x10^6/uL


(3.50-5.40) 





 


Hemoglobin


 


 


 9.9 g/dL


(12.0-15.5) 





 


Hematocrit


 


 


 29.3 %


(36.0-47.0) 





 


Mean Corpuscular Volume   98 fL ()  


 


Mean Corpuscular Hemoglobin   33 pg (25-35)  


 


Mean Corpuscular Hemoglobin


Concent 


 


 34 g/dL


(31-37) 





 


Red Cell Distribution Width


 


 


 13.4 %


(11.5-14.5) 





 


Platelet Count


 


 


 164 x10^3/uL


(140-400) 





 


Sodium Level


 


 


 138 mmol/L


(136-145) 





 


Potassium Level


 


 


 3.5 mmol/L


(3.5-5.1) 





 


Chloride Level


 


 


 102 mmol/L


() 





 


Carbon Dioxide Level


 


 


 28 mmol/L


(21-32) 





 


Anion Gap   8 (6-14)  


 


Blood Urea Nitrogen


 


 


 39 mg/dL


(7-20) 





 


Creatinine


 


 


 4.8 mg/dL


(0.6-1.0) 





 


Estimated GFR


(Cockcroft-Gault) 


 


 9.6 


 





 


Glucose Level


 


 


 263 mg/dL


(70-99) 





 


Calcium Level


 


 


 6.6 mg/dL


(8.5-10.1) 





 


Phosphorus Level


 


 


 3.4 mg/dL


(2.6-4.7) 





 


Magnesium Level


 


 


 2.2 mg/dL


(1.8-2.4) 





 


O2 Saturation    97 % (92-99) 


 


Arterial Blood pH


 


 


 


 7.28


(7.35-7.45)


 


Arterial Blood pCO2 at


Patient Temp 


 


 


 54 mmHg


(35-46)


 


Arterial Blood pO2 at Patient


Temp 


 


 


 107 mmHg


()


 


Arterial Blood HCO3


 


 


 


 25 mmol/L


(21-28)


 


Arterial Blood Base Excess


 


 


 


 -2 mmol/L


(-3-3)


 


FiO2    40 








Problem List


Problems


Medical Problems:


(1) Acute pancreatitis


Status: Acute  





(2) Cholelithiasis


Status: Acute  








Assessment/Plan


gallstone pancreatitis


respiratory failure





continue supportive care


no new surgical recs











KIEL BAL MD                Mar 21, 2020 10:50

## 2020-03-22 VITALS — SYSTOLIC BLOOD PRESSURE: 138 MMHG | DIASTOLIC BLOOD PRESSURE: 68 MMHG

## 2020-03-22 VITALS — SYSTOLIC BLOOD PRESSURE: 122 MMHG | DIASTOLIC BLOOD PRESSURE: 68 MMHG

## 2020-03-22 VITALS — DIASTOLIC BLOOD PRESSURE: 80 MMHG | SYSTOLIC BLOOD PRESSURE: 147 MMHG

## 2020-03-22 VITALS — SYSTOLIC BLOOD PRESSURE: 139 MMHG | DIASTOLIC BLOOD PRESSURE: 70 MMHG

## 2020-03-22 VITALS — SYSTOLIC BLOOD PRESSURE: 135 MMHG | DIASTOLIC BLOOD PRESSURE: 71 MMHG

## 2020-03-22 VITALS — DIASTOLIC BLOOD PRESSURE: 62 MMHG | SYSTOLIC BLOOD PRESSURE: 132 MMHG

## 2020-03-22 VITALS — SYSTOLIC BLOOD PRESSURE: 124 MMHG | DIASTOLIC BLOOD PRESSURE: 66 MMHG

## 2020-03-22 VITALS — DIASTOLIC BLOOD PRESSURE: 69 MMHG | SYSTOLIC BLOOD PRESSURE: 134 MMHG

## 2020-03-22 VITALS — DIASTOLIC BLOOD PRESSURE: 65 MMHG | SYSTOLIC BLOOD PRESSURE: 115 MMHG

## 2020-03-22 VITALS — DIASTOLIC BLOOD PRESSURE: 60 MMHG | SYSTOLIC BLOOD PRESSURE: 104 MMHG

## 2020-03-22 VITALS — SYSTOLIC BLOOD PRESSURE: 90 MMHG | DIASTOLIC BLOOD PRESSURE: 50 MMHG

## 2020-03-22 VITALS — DIASTOLIC BLOOD PRESSURE: 65 MMHG | SYSTOLIC BLOOD PRESSURE: 123 MMHG

## 2020-03-22 VITALS — SYSTOLIC BLOOD PRESSURE: 118 MMHG | DIASTOLIC BLOOD PRESSURE: 66 MMHG

## 2020-03-22 VITALS — SYSTOLIC BLOOD PRESSURE: 114 MMHG | DIASTOLIC BLOOD PRESSURE: 65 MMHG

## 2020-03-22 VITALS — SYSTOLIC BLOOD PRESSURE: 102 MMHG | DIASTOLIC BLOOD PRESSURE: 61 MMHG

## 2020-03-22 VITALS — DIASTOLIC BLOOD PRESSURE: 59 MMHG | SYSTOLIC BLOOD PRESSURE: 101 MMHG

## 2020-03-22 VITALS — DIASTOLIC BLOOD PRESSURE: 64 MMHG | SYSTOLIC BLOOD PRESSURE: 126 MMHG

## 2020-03-22 VITALS — SYSTOLIC BLOOD PRESSURE: 150 MMHG | DIASTOLIC BLOOD PRESSURE: 75 MMHG

## 2020-03-22 VITALS — SYSTOLIC BLOOD PRESSURE: 97 MMHG | DIASTOLIC BLOOD PRESSURE: 54 MMHG

## 2020-03-22 VITALS — DIASTOLIC BLOOD PRESSURE: 51 MMHG | SYSTOLIC BLOOD PRESSURE: 91 MMHG

## 2020-03-22 VITALS — DIASTOLIC BLOOD PRESSURE: 69 MMHG | SYSTOLIC BLOOD PRESSURE: 118 MMHG

## 2020-03-22 VITALS — SYSTOLIC BLOOD PRESSURE: 98 MMHG | DIASTOLIC BLOOD PRESSURE: 58 MMHG

## 2020-03-22 VITALS — DIASTOLIC BLOOD PRESSURE: 52 MMHG | SYSTOLIC BLOOD PRESSURE: 110 MMHG

## 2020-03-22 VITALS — DIASTOLIC BLOOD PRESSURE: 69 MMHG | SYSTOLIC BLOOD PRESSURE: 110 MMHG

## 2020-03-22 LAB
ANION GAP SERPL CALC-SCNC: 7 MMOL/L (ref 6–14)
BUN SERPL-MCNC: 36 MG/DL (ref 7–20)
CALCIUM SERPL-MCNC: 6.6 MG/DL (ref 8.5–10.1)
CHLORIDE SERPL-SCNC: 100 MMOL/L (ref 98–107)
CO2 SERPL-SCNC: 31 MMOL/L (ref 21–32)
CREAT SERPL-MCNC: 4 MG/DL (ref 0.6–1)
GFR SERPLBLD BASED ON 1.73 SQ M-ARVRAT: 11.9 ML/MIN
GLUCOSE SERPL-MCNC: 229 MG/DL (ref 70–99)
MAGNESIUM SERPL-MCNC: 2.1 MG/DL (ref 1.8–2.4)
PHOSPHATE SERPL-MCNC: 2.8 MG/DL (ref 2.6–4.7)
POTASSIUM SERPL-SCNC: 3.5 MMOL/L (ref 3.5–5.1)
SODIUM SERPL-SCNC: 138 MMOL/L (ref 136–145)

## 2020-03-22 RX ADMIN — MEROPENEM SCH MLS/HR: 500 INJECTION, POWDER, FOR SOLUTION INTRAVENOUS at 07:57

## 2020-03-22 RX ADMIN — Medication PRN EACH: at 14:19

## 2020-03-22 RX ADMIN — MEROPENEM SCH MLS/HR: 500 INJECTION, POWDER, FOR SOLUTION INTRAVENOUS at 21:09

## 2020-03-22 RX ADMIN — INSULIN LISPRO SCH UNITS: 100 INJECTION, SOLUTION INTRAVENOUS; SUBCUTANEOUS at 18:19

## 2020-03-22 RX ADMIN — METOPROLOL TARTRATE SCH MG: 5 INJECTION INTRAVENOUS at 18:11

## 2020-03-22 RX ADMIN — INSULIN LISPRO SCH UNITS: 100 INJECTION, SOLUTION INTRAVENOUS; SUBCUTANEOUS at 06:39

## 2020-03-22 RX ADMIN — METOPROLOL TARTRATE SCH MG: 5 INJECTION INTRAVENOUS at 11:56

## 2020-03-22 RX ADMIN — SODIUM BICARBONATE SCH MLS/HR: 84 INJECTION, SOLUTION INTRAVENOUS at 21:10

## 2020-03-22 RX ADMIN — HYDROMORPHONE HYDROCHLORIDE PRN MG: 2 INJECTION INTRAMUSCULAR; INTRAVENOUS; SUBCUTANEOUS at 03:25

## 2020-03-22 RX ADMIN — HYDROMORPHONE HYDROCHLORIDE PRN MG: 2 INJECTION INTRAMUSCULAR; INTRAVENOUS; SUBCUTANEOUS at 06:15

## 2020-03-22 RX ADMIN — METOPROLOL TARTRATE SCH MG: 5 INJECTION INTRAVENOUS at 06:16

## 2020-03-22 RX ADMIN — HYDROMORPHONE HYDROCHLORIDE PRN MG: 2 INJECTION INTRAMUSCULAR; INTRAVENOUS; SUBCUTANEOUS at 09:55

## 2020-03-22 RX ADMIN — INSULIN LISPRO SCH UNITS: 100 INJECTION, SOLUTION INTRAVENOUS; SUBCUTANEOUS at 12:05

## 2020-03-22 RX ADMIN — HYDROMORPHONE HYDROCHLORIDE PRN MG: 2 INJECTION INTRAMUSCULAR; INTRAVENOUS; SUBCUTANEOUS at 12:31

## 2020-03-22 RX ADMIN — METOPROLOL TARTRATE SCH MG: 5 INJECTION INTRAVENOUS at 00:04

## 2020-03-22 RX ADMIN — HYDROMORPHONE HYDROCHLORIDE PRN MG: 2 INJECTION INTRAMUSCULAR; INTRAVENOUS; SUBCUTANEOUS at 21:27

## 2020-03-22 RX ADMIN — Medication PRN EACH: at 11:04

## 2020-03-22 RX ADMIN — Medication PRN EACH: at 14:15

## 2020-03-22 RX ADMIN — INSULIN LISPRO SCH UNITS: 100 INJECTION, SOLUTION INTRAVENOUS; SUBCUTANEOUS at 00:08

## 2020-03-22 RX ADMIN — SODIUM BICARBONATE SCH MLS/HR: 84 INJECTION, SOLUTION INTRAVENOUS at 03:25

## 2020-03-22 RX ADMIN — HYDROMORPHONE HYDROCHLORIDE PRN MG: 2 INJECTION INTRAMUSCULAR; INTRAVENOUS; SUBCUTANEOUS at 00:04

## 2020-03-22 RX ADMIN — HYDROMORPHONE HYDROCHLORIDE PRN MG: 2 INJECTION INTRAMUSCULAR; INTRAVENOUS; SUBCUTANEOUS at 18:12

## 2020-03-22 RX ADMIN — PANTOPRAZOLE SODIUM SCH MG: 40 INJECTION, POWDER, FOR SOLUTION INTRAVENOUS at 07:57

## 2020-03-22 NOTE — PDOC
PULMONARY PROGRESS NOTES


Subjective


on bipap, more alert, has abd pain, on diladid, t max 100.2, had HD yesterday


Vitals





Vital Signs








  Date Time  Temp Pulse Resp B/P (MAP) Pulse Ox O2 Delivery O2 Flow Rate FiO2


 


3/22/20 08:00 100.2 114 20 98/58 (71) 98 BiPAP/CPAP  





 100.2       








Comments


discussed w rn, rian as mentioned as above other sys otherwise neg


more alert


on bipap


HEENT:  Other (nc at perrl   bipap mask on   neck no lad   no thyromegaly)


Lungs:  Crackles, Other (dull at bases)


Cardiovascular:  S1, S2


Abdomen:  Other (distended,  diffuse pain   no mass)


Extremities:  No Edema


Skin:  Warm


Labs





Laboratory Tests








Test


 3/20/20


11:25 3/20/20


11:30 3/20/20


12:21 3/20/20


16:30


 


O2 Saturation 99 % (92-99)    93 % (92-99) 


 


Arterial Blood pH


 7.42


(7.35-7.45) 


 


 7.38


(7.35-7.45)


 


Arterial Blood pCO2 at


Patient Temp 53 mmHg


(35-46) 


 


 56 mmHg


(35-46)


 


Arterial Blood pO2 at Patient


Temp 136 mmHg


() 


 


 71 mmHg


()


 


Arterial Blood HCO3


 33 mmol/L


(21-28) 


 


 33 mmol/L


(21-28)


 


Arterial Blood Base Excess


 7 mmol/L


(-3-3) 


 


 7 mmol/L


(-3-3)


 


FiO2 60    40 


 


Potassium Level


 


 3.4 mmol/L


(3.5-5.1) 


 





 


Glucose (Fingerstick)


 


 


 135 mg/dL


(70-99) 





 


Test


 3/20/20


17:46 3/20/20


23:15 3/21/20


06:07 3/21/20


06:10


 


Glucose (Fingerstick)


 261 mg/dL


(70-99) 290 mg/dL


(70-99) 239 mg/dL


(70-99) 





 


White Blood Count


 


 


 


 15.0 x10^3/uL


(4.0-11.0)


 


Red Blood Count


 


 


 


 2.98 x10^6/uL


(3.50-5.40)


 


Hemoglobin


 


 


 


 9.9 g/dL


(12.0-15.5)


 


Hematocrit


 


 


 


 29.3 %


(36.0-47.0)


 


Mean Corpuscular Volume    98 fL () 


 


Mean Corpuscular Hemoglobin    33 pg (25-35) 


 


Mean Corpuscular Hemoglobin


Concent 


 


 


 34 g/dL


(31-37)


 


Red Cell Distribution Width


 


 


 


 13.4 %


(11.5-14.5)


 


Platelet Count


 


 


 


 164 x10^3/uL


(140-400)


 


Sodium Level


 


 


 


 138 mmol/L


(136-145)


 


Potassium Level


 


 


 


 3.5 mmol/L


(3.5-5.1)


 


Chloride Level


 


 


 


 102 mmol/L


()


 


Carbon Dioxide Level


 


 


 


 28 mmol/L


(21-32)


 


Anion Gap    8 (6-14) 


 


Blood Urea Nitrogen


 


 


 


 39 mg/dL


(7-20)


 


Creatinine


 


 


 


 4.8 mg/dL


(0.6-1.0)


 


Estimated GFR


(Cockcroft-Gault) 


 


 


 9.6 





 


Glucose Level


 


 


 


 263 mg/dL


(70-99)


 


Calcium Level


 


 


 


 6.6 mg/dL


(8.5-10.1)


 


Phosphorus Level


 


 


 


 3.4 mg/dL


(2.6-4.7)


 


Magnesium Level


 


 


 


 2.2 mg/dL


(1.8-2.4)


 


Test


 3/21/20


07:33 3/21/20


12:13 3/22/20


00:07 3/22/20


06:30


 


O2 Saturation 97 % (92-99)    


 


Arterial Blood pH


 7.28


(7.35-7.45) 


 


 





 


Arterial Blood pCO2 at


Patient Temp 54 mmHg


(35-46) 


 


 





 


Arterial Blood pO2 at Patient


Temp 107 mmHg


() 


 


 





 


Arterial Blood HCO3


 25 mmol/L


(21-28) 


 


 





 


Arterial Blood Base Excess


 -2 mmol/L


(-3-3) 


 


 





 


FiO2 40    


 


Glucose (Fingerstick)


 


 137 mg/dL


(70-99) 274 mg/dL


(70-99) 





 


Sodium Level


 


 


 


 138 mmol/L


(136-145)


 


Potassium Level


 


 


 


 3.5 mmol/L


(3.5-5.1)


 


Chloride Level


 


 


 


 100 mmol/L


()


 


Carbon Dioxide Level


 


 


 


 31 mmol/L


(21-32)


 


Anion Gap    7 (6-14) 


 


Blood Urea Nitrogen


 


 


 


 36 mg/dL


(7-20)


 


Creatinine


 


 


 


 4.0 mg/dL


(0.6-1.0)


 


Estimated GFR


(Cockcroft-Gault) 


 


 


 11.9 





 


Glucose Level


 


 


 


 229 mg/dL


(70-99)


 


Calcium Level


 


 


 


 6.6 mg/dL


(8.5-10.1)


 


Phosphorus Level


 


 


 


 2.8 mg/dL


(2.6-4.7)


 


Magnesium Level


 


 


 


 2.1 mg/dL


(1.8-2.4)


 


Test


 3/22/20


06:33 


 


 





 


Glucose (Fingerstick)


 208 mg/dL


(70-99) 


 


 











Laboratory Tests








Test


 3/21/20


12:13 3/22/20


00:07 3/22/20


06:30 3/22/20


06:33


 


Glucose (Fingerstick)


 137 mg/dL


(70-99) 274 mg/dL


(70-99) 


 208 mg/dL


(70-99)


 


Sodium Level


 


 


 138 mmol/L


(136-145) 





 


Potassium Level


 


 


 3.5 mmol/L


(3.5-5.1) 





 


Chloride Level


 


 


 100 mmol/L


() 





 


Carbon Dioxide Level


 


 


 31 mmol/L


(21-32) 





 


Anion Gap   7 (6-14)  


 


Blood Urea Nitrogen


 


 


 36 mg/dL


(7-20) 





 


Creatinine


 


 


 4.0 mg/dL


(0.6-1.0) 





 


Estimated GFR


(Cockcroft-Gault) 


 


 11.9 


 





 


Glucose Level


 


 


 229 mg/dL


(70-99) 





 


Calcium Level


 


 


 6.6 mg/dL


(8.5-10.1) 





 


Phosphorus Level


 


 


 2.8 mg/dL


(2.6-4.7) 





 


Magnesium Level


 


 


 2.1 mg/dL


(1.8-2.4) 











Medications





Active Scripts








 Medications  Dose


 Route/Sig


 Max Daily Dose Days Date Category


 


 Bisoprolol


 Fumarate 5 Mg


 Tablet  10 Mg


 PO DAILY


   3/16/20 Reported








Comments


reviewed cxr 3/21





Continued presence of bibasilar infiltrates and pleural effusions without 


interval change.





Impression


.


IMPRESSION:


1.  Acute hypoxemic respiratory failure secondary to acute pancreatitis, sepsis,

abdominal distention, and pneumonia.


2.  Gallstone pancreatitis.


3.  Severe metabolic acidosis.


4.  Acute kidney injury. ON HD


5.  Acute gallstone pancreatitis.


6.  Hypoalbuminemia.


7.  Hypocalcemia.





Plan


.


cont bipap night, setting reviewed, slightly better today


still febrile, ct in am


02 titration


BD


avoid over sedation


elevate hob


protonix  scds for prophylaxis


HD PER NEPHRO


TPN


FOLLOW SURGERY INPUT





discussed w MAN Jennings MD               Mar 22, 2020 08:33

## 2020-03-22 NOTE — NUR
Pharmacy TPN Dosing Note



S: JESENIA RICH is a 49 year old F Currently receiving Central Continuous TPN started 
03/18/20



B:Pertinent PMH: 

Necrotizing pancreatitis

Height: 5 feet, 8 inches

Weight: 95.0 kg



Current diet: NPO 



LABS:

Sodium:    138 

Potassium: 3.5 

Chloride:  102 

Calcium:   6.6 

Corrected Calcium: 8.60 

Magnesium: 2.2 

CO2:       28 

SCr:       4.8 

Glucose:   208 

Albumin:   1.5 

AST:       56 

ALT:       45 



TPN FORMULA:

TPN TYPE:  Central Continuous

AMINO ACIDS:         60 gm

DEXTROSE:            195 gm

LIPIDS:              20 gm

SODIUM CHLORIDE:     90 mEq

SODIUM ACETATE:      -- mEq

SODIUM PHOSPHATE:    -- mmol

POTASSIUM CHLORIDE:  15 mEq

POTASSIUM ACETATE:   -- mEq

POTASSIUM PHOSPHATE: 15 mmol

MAGNESIUM:           10 mEq

CALCIUM:             20 mEq

INSULIN:             -- units

MULTIPLE VITAMIN:    10 ml

TRACE ELEMENTS:      0.5 ml(s)



TPN PLAN:  

increase kphos to 15 mmOL





R: CONTINUE TPN AT 63ML/HR

Will monitor electrolytes, glucose, and tolerance to TPN.



 YENIFER STREETER [facility], 03/22/20 1114

## 2020-03-22 NOTE — PDOC
SURGICAL PROGRESS NOTE


Subjective


sleepy, just had some pain meds


will stir


BIPAP on


Vital Signs





Vital Signs








  Date Time  Temp Pulse Resp B/P (MAP) Pulse Ox O2 Delivery O2 Flow Rate FiO2


 


3/22/20 12:31   26  96 BiPAP/CPAP  


 


3/22/20 12:00 99.0 108  114/65 (81)    





 99.0       





tachy


I&O











Intake and Output 


 


 3/22/20





 07:00


 


Intake Total 4690.6 ml


 


Output Total 665 ml


 


Balance 4025.6 ml


 


 


 


Intake Oral 0 ml


 


IV Total 4690.6 ml


 


Output Urine Total 90 ml


 


Stool Total 300 ml


 


Gastric Drainage Total 275 ml








PATIENT HAS A VILLASENOR:  Yes


HEENT:  Other (facemask on)


Abdomen:  Other (moderately distended)


Labs





Laboratory Tests








Test


 3/20/20


16:30 3/20/20


17:46 3/20/20


23:15 3/21/20


06:07


 


O2 Saturation 93 % (92-99)    


 


Arterial Blood pH


 7.38


(7.35-7.45) 


 


 





 


Arterial Blood pCO2 at


Patient Temp 56 mmHg


(35-46) 


 


 





 


Arterial Blood pO2 at Patient


Temp 71 mmHg


() 


 


 





 


Arterial Blood HCO3


 33 mmol/L


(21-28) 


 


 





 


Arterial Blood Base Excess


 7 mmol/L


(-3-3) 


 


 





 


FiO2 40    


 


Glucose (Fingerstick)


 


 261 mg/dL


(70-99) 290 mg/dL


(70-99) 239 mg/dL


(70-99)


 


Test


 3/21/20


06:10 3/21/20


07:33 3/21/20


12:13 3/22/20


00:07


 


White Blood Count


 15.0 x10^3/uL


(4.0-11.0) 


 


 





 


Red Blood Count


 2.98 x10^6/uL


(3.50-5.40) 


 


 





 


Hemoglobin


 9.9 g/dL


(12.0-15.5) 


 


 





 


Hematocrit


 29.3 %


(36.0-47.0) 


 


 





 


Mean Corpuscular Volume 98 fL ()    


 


Mean Corpuscular Hemoglobin 33 pg (25-35)    


 


Mean Corpuscular Hemoglobin


Concent 34 g/dL


(31-37) 


 


 





 


Red Cell Distribution Width


 13.4 %


(11.5-14.5) 


 


 





 


Platelet Count


 164 x10^3/uL


(140-400) 


 


 





 


Sodium Level


 138 mmol/L


(136-145) 


 


 





 


Potassium Level


 3.5 mmol/L


(3.5-5.1) 


 


 





 


Chloride Level


 102 mmol/L


() 


 


 





 


Carbon Dioxide Level


 28 mmol/L


(21-32) 


 


 





 


Anion Gap 8 (6-14)    


 


Blood Urea Nitrogen


 39 mg/dL


(7-20) 


 


 





 


Creatinine


 4.8 mg/dL


(0.6-1.0) 


 


 





 


Estimated GFR


(Cockcroft-Gault) 9.6 


 


 


 





 


Glucose Level


 263 mg/dL


(70-99) 


 


 





 


Calcium Level


 6.6 mg/dL


(8.5-10.1) 


 


 





 


Phosphorus Level


 3.4 mg/dL


(2.6-4.7) 


 


 





 


Magnesium Level


 2.2 mg/dL


(1.8-2.4) 


 


 





 


O2 Saturation  97 % (92-99)   


 


Arterial Blood pH


 


 7.28


(7.35-7.45) 


 





 


Arterial Blood pCO2 at


Patient Temp 


 54 mmHg


(35-46) 


 





 


Arterial Blood pO2 at Patient


Temp 


 107 mmHg


() 


 





 


Arterial Blood HCO3


 


 25 mmol/L


(21-28) 


 





 


Arterial Blood Base Excess


 


 -2 mmol/L


(-3-3) 


 





 


FiO2  40   


 


Glucose (Fingerstick)


 


 


 137 mg/dL


(70-99) 274 mg/dL


(70-99)


 


Test


 3/22/20


06:30 3/22/20


06:33 3/22/20


11:50 





 


Sodium Level


 138 mmol/L


(136-145) 


 


 





 


Potassium Level


 3.5 mmol/L


(3.5-5.1) 


 


 





 


Chloride Level


 100 mmol/L


() 


 


 





 


Carbon Dioxide Level


 31 mmol/L


(21-32) 


 


 





 


Anion Gap 7 (6-14)    


 


Blood Urea Nitrogen


 36 mg/dL


(7-20) 


 


 





 


Creatinine


 4.0 mg/dL


(0.6-1.0) 


 


 





 


Estimated GFR


(Cockcroft-Gault) 11.9 


 


 


 





 


Glucose Level


 229 mg/dL


(70-99) 


 


 





 


Calcium Level


 6.6 mg/dL


(8.5-10.1) 


 


 





 


Phosphorus Level


 2.8 mg/dL


(2.6-4.7) 


 


 





 


Magnesium Level


 2.1 mg/dL


(1.8-2.4) 


 


 





 


Glucose (Fingerstick)


 


 208 mg/dL


(70-99) 216 mg/dL


(70-99) 











Laboratory Tests








Test


 3/22/20


00:07 3/22/20


06:30 3/22/20


06:33 3/22/20


11:50


 


Glucose (Fingerstick)


 274 mg/dL


(70-99) 


 208 mg/dL


(70-99) 216 mg/dL


(70-99)


 


Sodium Level


 


 138 mmol/L


(136-145) 


 





 


Potassium Level


 


 3.5 mmol/L


(3.5-5.1) 


 





 


Chloride Level


 


 100 mmol/L


() 


 





 


Carbon Dioxide Level


 


 31 mmol/L


(21-32) 


 





 


Anion Gap  7 (6-14)   


 


Blood Urea Nitrogen


 


 36 mg/dL


(7-20) 


 





 


Creatinine


 


 4.0 mg/dL


(0.6-1.0) 


 





 


Estimated GFR


(Cockcroft-Gault) 


 11.9 


 


 





 


Glucose Level


 


 229 mg/dL


(70-99) 


 





 


Calcium Level


 


 6.6 mg/dL


(8.5-10.1) 


 





 


Phosphorus Level


 


 2.8 mg/dL


(2.6-4.7) 


 





 


Magnesium Level


 


 2.1 mg/dL


(1.8-2.4) 


 











Problem List


Problems


Medical Problems:


(1) Acute pancreatitis


Status: Acute  





(2) Cholelithiasis


Status: Acute  








Assessment/Plan


severe gallstone pancreatitis


respiratory failure


acidosis





continue supportive care


no new surgical recs











KIEL BAL MD                Mar 22, 2020 13:28

## 2020-03-22 NOTE — PDOC
G I PROGRESS NOTE


Subjective


Moaning.


Physical Exam


Lungs fairly clear anteriorly.


RRR


Abdomen remains "doughy".  No bowel sounds heard.


Review of Relevant


I have reviewed the following items josy (where applicable) has been applied.


Labs





Laboratory Tests








Test


 3/20/20


12:21 3/20/20


16:30 3/20/20


17:46 3/20/20


23:15


 


Glucose (Fingerstick)


 135 mg/dL


(70-99) 


 261 mg/dL


(70-99) 290 mg/dL


(70-99)


 


O2 Saturation  93 % (92-99)   


 


Arterial Blood pH


 


 7.38


(7.35-7.45) 


 





 


Arterial Blood pCO2 at


Patient Temp 


 56 mmHg


(35-46) 


 





 


Arterial Blood pO2 at Patient


Temp 


 71 mmHg


() 


 





 


Arterial Blood HCO3


 


 33 mmol/L


(21-28) 


 





 


Arterial Blood Base Excess


 


 7 mmol/L


(-3-3) 


 





 


FiO2  40   


 


Test


 3/21/20


06:07 3/21/20


06:10 3/21/20


07:33 3/21/20


12:13


 


Glucose (Fingerstick)


 239 mg/dL


(70-99) 


 


 137 mg/dL


(70-99)


 


White Blood Count


 


 15.0 x10^3/uL


(4.0-11.0) 


 





 


Red Blood Count


 


 2.98 x10^6/uL


(3.50-5.40) 


 





 


Hemoglobin


 


 9.9 g/dL


(12.0-15.5) 


 





 


Hematocrit


 


 29.3 %


(36.0-47.0) 


 





 


Mean Corpuscular Volume  98 fL ()   


 


Mean Corpuscular Hemoglobin  33 pg (25-35)   


 


Mean Corpuscular Hemoglobin


Concent 


 34 g/dL


(31-37) 


 





 


Red Cell Distribution Width


 


 13.4 %


(11.5-14.5) 


 





 


Platelet Count


 


 164 x10^3/uL


(140-400) 


 





 


Sodium Level


 


 138 mmol/L


(136-145) 


 





 


Potassium Level


 


 3.5 mmol/L


(3.5-5.1) 


 





 


Chloride Level


 


 102 mmol/L


() 


 





 


Carbon Dioxide Level


 


 28 mmol/L


(21-32) 


 





 


Anion Gap  8 (6-14)   


 


Blood Urea Nitrogen


 


 39 mg/dL


(7-20) 


 





 


Creatinine


 


 4.8 mg/dL


(0.6-1.0) 


 





 


Estimated GFR


(Cockcroft-Gault) 


 9.6 


 


 





 


Glucose Level


 


 263 mg/dL


(70-99) 


 





 


Calcium Level


 


 6.6 mg/dL


(8.5-10.1) 


 





 


Phosphorus Level


 


 3.4 mg/dL


(2.6-4.7) 


 





 


Magnesium Level


 


 2.2 mg/dL


(1.8-2.4) 


 





 


O2 Saturation   97 % (92-99)  


 


Arterial Blood pH


 


 


 7.28


(7.35-7.45) 





 


Arterial Blood pCO2 at


Patient Temp 


 


 54 mmHg


(35-46) 





 


Arterial Blood pO2 at Patient


Temp 


 


 107 mmHg


() 





 


Arterial Blood HCO3


 


 


 25 mmol/L


(21-28) 





 


Arterial Blood Base Excess


 


 


 -2 mmol/L


(-3-3) 





 


FiO2   40  


 


Test


 3/22/20


00:07 3/22/20


06:30 3/22/20


06:33 3/22/20


11:50


 


Glucose (Fingerstick)


 274 mg/dL


(70-99) 


 208 mg/dL


(70-99) 216 mg/dL


(70-99)


 


Sodium Level


 


 138 mmol/L


(136-145) 


 





 


Potassium Level


 


 3.5 mmol/L


(3.5-5.1) 


 





 


Chloride Level


 


 100 mmol/L


() 


 





 


Carbon Dioxide Level


 


 31 mmol/L


(21-32) 


 





 


Anion Gap  7 (6-14)   


 


Blood Urea Nitrogen


 


 36 mg/dL


(7-20) 


 





 


Creatinine


 


 4.0 mg/dL


(0.6-1.0) 


 





 


Estimated GFR


(Cockcroft-Gault) 


 11.9 


 


 





 


Glucose Level


 


 229 mg/dL


(70-99) 


 





 


Calcium Level


 


 6.6 mg/dL


(8.5-10.1) 


 





 


Phosphorus Level


 


 2.8 mg/dL


(2.6-4.7) 


 





 


Magnesium Level


 


 2.1 mg/dL


(1.8-2.4) 


 











Laboratory Tests








Test


 3/21/20


12:13 3/22/20


00:07 3/22/20


06:30 3/22/20


06:33


 


Glucose (Fingerstick)


 137 mg/dL


(70-99) 274 mg/dL


(70-99) 


 208 mg/dL


(70-99)


 


Sodium Level


 


 


 138 mmol/L


(136-145) 





 


Potassium Level


 


 


 3.5 mmol/L


(3.5-5.1) 





 


Chloride Level


 


 


 100 mmol/L


() 





 


Carbon Dioxide Level


 


 


 31 mmol/L


(21-32) 





 


Anion Gap   7 (6-14)  


 


Blood Urea Nitrogen


 


 


 36 mg/dL


(7-20) 





 


Creatinine


 


 


 4.0 mg/dL


(0.6-1.0) 





 


Estimated GFR


(Cockcroft-Gault) 


 


 11.9 


 





 


Glucose Level


 


 


 229 mg/dL


(70-99) 





 


Calcium Level


 


 


 6.6 mg/dL


(8.5-10.1) 





 


Phosphorus Level


 


 


 2.8 mg/dL


(2.6-4.7) 





 


Magnesium Level


 


 


 2.1 mg/dL


(1.8-2.4) 





 


Test


 3/22/20


11:50 


 


 





 


Glucose (Fingerstick)


 216 mg/dL


(70-99) 


 


 











Microbiology


3/18/20 Blood Culture - Preliminary, Resulted


          NO GROWTH AFTER 4 DAYS


Vitals/I & O





Vital Sign - Last 24 Hours








 3/21/20 3/21/20 3/21/20 3/21/20





 12:49 12:51 12:54 13:00


 


Pulse   140 117


 


Resp  16  17


 


B/P (MAP)   121/55 120/58 (78)


 


Pulse Ox 98 99  99


 


O2 Delivery BiPAP/CPAP BiPAP/CPAP  BiPAP/CPAP





 3/21/20 3/21/20 3/21/20 3/21/20





 13:21 14:00 15:00 15:28


 


Pulse  134 130 


 


Resp 19 17 20 


 


B/P (MAP)  144/56 (85) 119/51 (73) 


 


Pulse Ox 95 99 97 98


 


O2 Delivery BiPAP/CPAP BiPAP/CPAP BiPAP/CPAP BiPAP/CPAP





 3/21/20 3/21/20 3/21/20 3/21/20





 16:00 16:00 16:00 16:24


 


Temp   101.6 





   101.6 


 


Pulse  139 139 


 


Resp   22 


 


B/P (MAP)  102/50 (67) 102/50 (67) 


 


Pulse Ox   99 94


 


O2 Delivery Bi-pap  BiPAP/CPAP BiPAP/CPAP


 


    





    





 3/21/20 3/21/20 3/21/20 3/21/20





 17:00 17:41 17:43 18:00


 


Temp    99.6





    99.6


 


Pulse 134  122 108


 


Resp 21 25  19


 


B/P (MAP) 94/50 (65)  100/53 90/52 (65)


 


Pulse Ox 99 99  100


 


O2 Delivery BiPAP/CPAP BiPAP/CPAP  BiPAP/CPAP


 


    





    





 3/21/20 3/21/20 3/21/20 3/21/20





 18:11 19:00 20:00 20:00


 


Temp   99.4 





   99.4 


 


Pulse  108 108 


 


Resp 19 18 20 


 


B/P (MAP)  82/52 (62) 109/68 (82) 


 


Pulse Ox 99 97 100 


 


O2 Delivery BiPAP/CPAP BiPAP/CPAP BiPAP/CPAP Bi-pap


 


    





    





 3/21/20 3/21/20 3/21/20 3/21/20





 20:00 20:15 20:58 21:00


 


Pulse    108


 


Resp   24 16


 


B/P (MAP)    88/61 (70)


 


Pulse Ox  100 100 100


 


O2 Delivery  BiPAP/CPAP BiPAP/CPAP BiPAP/CPAP





 3/21/20 3/21/20 3/21/20 3/21/20





 21:31 22:00 22:26 23:00


 


Pulse  109  115


 


Resp 16 15  25


 


B/P (MAP)  105/66 (79)  121/67 (85)


 


Pulse Ox 100 99 99 99


 


O2 Delivery BiPAP/CPAP BiPAP/CPAP  BiPAP/CPAP





 3/22/20 3/22/20 3/22/20 3/22/20





 00:00 00:00 00:00 00:04


 


Temp 99.0   





 99.0   


 


Pulse 105   118


 


Resp 21   


 


B/P (MAP) 150/75 (100)   150/75


 


Pulse Ox 99   


 


O2 Delivery BiPAP/CPAP Bi-pap  


 


    





    





 3/22/20 3/22/20 3/22/20 3/22/20





 00:04 00:41 01:00 01:00


 


Pulse    112


 


Resp 26 16  18


 


B/P (MAP)    101/59 (73)


 


Pulse Ox 99 100 100 99


 


O2 Delivery BiPAP/CPAP BiPAP/CPAP BiPAP/CPAP BiPAP/CPAP





 3/22/20 3/22/20 3/22/20 3/22/20





 02:00 03:00 03:25 03:55


 


Pulse 118 122  


 


Resp 18 21 30 20


 


B/P (MAP) 115/65 (82) 138/68 (91)  


 


Pulse Ox 100 99 94 97


 


O2 Delivery BiPAP/CPAP BiPAP/CPAP BiPAP/CPAP BiPAP/CPAP





 3/22/20 3/22/20 3/22/20 3/22/20





 04:00 04:00 04:00 04:01


 


Temp  99.5  





  99.5  


 


Pulse  128  


 


Resp  20  


 


B/P (MAP)  124/66 (85)  


 


Pulse Ox  98  100


 


O2 Delivery  BiPAP/CPAP Bi-pap BiPAP/CPAP


 


    





    





 3/22/20 3/22/20 3/22/20 3/22/20





 05:00 06:00 06:15 06:16


 


Pulse 130 126  128


 


Resp 22 21 20 


 


B/P (MAP) 147/80 (102) 110/69 (83)  110/68


 


Pulse Ox 100 97 98 


 


O2 Delivery BiPAP/CPAP BiPAP/CPAP BiPAP/CPAP 





 3/22/20 3/22/20 3/22/20 3/22/20





 06:47 07:00 07:29 08:00


 


Pulse  118  


 


Resp 20 19  


 


B/P (MAP)  91/51 (64)  


 


Pulse Ox 99 99 98 


 


O2 Delivery BiPAP/CPAP BiPAP/CPAP BiPAP/CPAP Bi-pap





 3/22/20 3/22/20 3/22/20 3/22/20





 08:00 09:00 09:47 09:55


 


Temp 100.2   





 100.2   


 


Pulse 114 121  


 


Resp 20 26  28


 


B/P (MAP) 98/58 (71) 97/54 (68)  


 


Pulse Ox 98 98 98 98


 


O2 Delivery BiPAP/CPAP BiPAP/CPAP BiPAP/CPAP BiPAP/CPAP


 


    





    





 3/22/20 3/22/20 3/22/20 3/22/20





 10:00 11:00 11:10 11:56


 


Pulse 120 127  124


 


Resp 21 27  


 


B/P (MAP) 102/61 (75) 90/50 (63)  120/63


 


Pulse Ox 99 97 98 


 


O2 Delivery BiPAP/CPAP BiPAP/CPAP BiPAP/CPAP 














Intake and Output   


 


 3/21/20 3/21/20 3/22/20





 15:00 23:00 07:00


 


Intake Total 350 ml 2220 ml 2120.6 ml


 


Output Total 20 ml 590 ml 55 ml


 


Balance 330 ml 1630 ml 2065.6 ml








Problem List


Problems


Medical Problems:


(1) Acute pancreatitis


Status: Acute  





(2) Cholelithiasis


Status: Acute  





Assessment


Biliary pancreatitis, severe, with MOSF.  Remains anuric and acidotic.


Plan of Care Note


Continue support. 


Interval CT next week.





Hemodynamically unstable?:  Yes


Is patient in severe pain?:  Yes


Is NPO status required?:  Yes











MARCO ANTONIO LONGO MD          Mar 22, 2020 12:12

## 2020-03-22 NOTE — PDOC
Infectious Disease Note


Subjective


Subjective


Fever Tmax 100.2


Remains on BiPAP FiO2 40%


Off Levophed gtt


TPN





ROS


ROS


unobtainable





Vital Sign


Vital Signs





Vital Signs








  Date Time  Temp Pulse Resp B/P (MAP) Pulse Ox O2 Delivery O2 Flow Rate FiO2


 


3/22/20 08:00 100.2 114 20 98/58 (71) 98 BiPAP/CPAP  





 100.2       











Physical Exam


PHYSICAL EXAM


GENERAL: Lethargic, on BiPAP, + mitts, calm 


HEENT: Mild icterus.  Oral mucosa very dry.


NECK:  Supple. 


LUNGS:  Decreased breath sounds at the bases.  Soft wheezes


HEART:  S1, S2, tachycardia, 110s, regular 


ABDOMEN:  Distended, moans to palpation, reproducible, + BS. Rectal tube in 

place


: [place]   


EXTREMITIES: Trace edema, no cyanosis.


DERMATOLOGIC:  Warm and dry.  No generalized rash.  


CENTRAL NERVOUS SYSTEM: Extremely lethargic


RUE-PICC, RIJ/Temp HDC & LIJ without signs of complications





Labs


Lab





Laboratory Tests








Test


 3/21/20


12:13 3/22/20


00:07 3/22/20


06:30 3/22/20


06:33


 


Glucose (Fingerstick)


 137 mg/dL


(70-99) 274 mg/dL


(70-99) 


 208 mg/dL


(70-99)


 


Sodium Level


 


 


 138 mmol/L


(136-145) 





 


Potassium Level


 


 


 3.5 mmol/L


(3.5-5.1) 





 


Chloride Level


 


 


 100 mmol/L


() 





 


Carbon Dioxide Level


 


 


 31 mmol/L


(21-32) 





 


Anion Gap   7 (6-14)  


 


Blood Urea Nitrogen


 


 


 36 mg/dL


(7-20) 





 


Creatinine


 


 


 4.0 mg/dL


(0.6-1.0) 





 


Estimated GFR


(Cockcroft-Gault) 


 


 11.9 


 





 


Glucose Level


 


 


 229 mg/dL


(70-99) 





 


Calcium Level


 


 


 6.6 mg/dL


(8.5-10.1) 





 


Phosphorus Level


 


 


 2.8 mg/dL


(2.6-4.7) 





 


Magnesium Level


 


 


 2.1 mg/dL


(1.8-2.4) 











Micro





Microbiology


3/18/20 Blood Culture - Preliminary, Resulted


          NO GROWTH AFTER 3 DAYS





Objective


Assessment


Fever 


Acute pancreatitis, early developing necrosis


Cholelithiasis


Leucocytosis - trending down 


JED, hyperkalemia, metabolic acidosis on HD


Acute hypoxic resp failure, bilateral pleural effusion on BiPAP


Hypotension - off Levophed 


Hypocalcemia 


Prediabetes


HTN





Plan


Plan of Care


cont merrem, renal dosing and Zyvox (3/20) 


f/u labs and cults


No surgical plans at this time








Labs in am CBC with diff/CMP/lipase


Electrolytes per primary


CT in am





Critically ill





D/w nursing





Attending Co-Sign


Attending Co-Sign


The patient was seen and interviewed as well as examined at the bedside. The 

chart was reviewed. The case was discussed. Agree with the plan of care.











HAVEN WINTERS        Mar 22, 2020 09:03


RASHAWN ROSEN MD              Mar 22, 2020 13:58

## 2020-03-22 NOTE — PDOC
PROGRESS NOTES


Chief Complaint


Chief Complaint


A/P:


Acute pancreatitis - with necrosis/infection, likely gallstone pancreatitis. GI,

ID, and general surgery consulted. Will keep NPO, pain control.


Calculus of gallbladder with acute cholecystitis without obstruction - with 

secondary pancreatitis. Lap naif is indicated, will need to await pancreatitis 

resolution


Prediabetes - will keep on sliding scale


HTN - cont prn hydralazine


Sepsis - with acute pancreatitis as end organ dysfunction, sign of infection, 

zyvox, merrem


Acute kidney failure now requiring dialysis


Salpingitis


Uterine fibroid


Hypoxia with respiratory failure - requiring bipap, likely pulmonary edema 

related to pancreatitis, sepsis


Intractable pain


Intractable nausea





FEN - NPO


PPX - heparin


FULL CODE


Dispo - inpatient for 2 midnights. Transferred to ICU for worsening pancreatitis

symptoms


CC time 45 minutes





History of Present Illness


History of Present Illness


Ms Tadeo is a 50yo F w/ PMHx HTN, prediabetes who presents the emergency room

complaints of abdominal pain. Patient described off and on 3 days. She states is

constant, described as a squeezing sensation in a band-like distribution. + 

nausea, vomiting.  She denies any fever or diarrhea.  Patient denies any 

abdominal surgical procedures.  She states is worse with movements, car ride.  

Pain initially was upper abdomen however now pretty much generalized.  Last 

bowel movement was 3/15/2020. Nothing makes her pain better.  Patient denies any

shortness of breath.  She does state the pain moves into her chest.  Denies any 

headache or visual changes.


Lipase 86606, , , Bilirubin 1.4.


CT abdomen confirms pancreatic inflammation, peripancreatic fluid and inflam

matory changes around the pancreas consistent with pancreatitis. Cholelithiasis 

and 1.4cm uterine fibroid as well as possible left salpingitis. Admitted for 

further care


GI, General surgery, ID, Pulm consulted.





3/17: Overnight per report no urine output. Added dilaudid for pain, PICC placed

per IR. Renal US negative.Seen bedside in ICU, given 2L additional NSS and 

albumin infusion. Still hypotensive, started on levophed. Repeat CT abdomen with

necrosis. Updated her fiancee


3/18: Sats are only 87% on nasal cannula oxygen. Dialysis catheter per 

nephrology


3/19: She is now on BiPAP appears more ill, now on dialysis


3/20: Seen on BiPAP. Her mother and another family member are present and seemed

to be good support for her. Currently on dialysis. Appears critically ill


3/21: Overnight Tmax 101.7 , still on BiPAP FiO2 40%, still on low dose Levophed

gtt, TPN initiated. On dialysis





Overnight still febrile. Dialysis today. She wakes up and responds to pain. No 

CP.





Vitals


Vitals





Vital Signs








  Date Time  Temp Pulse Resp B/P (MAP) Pulse Ox O2 Delivery O2 Flow Rate FiO2


 


3/22/20 09:00  121 26 97/54 (68) 98 BiPAP/CPAP  


 


3/22/20 08:00 100.2       





 100.2       











Physical Exam


Physical Exam


GENERAL: Lethargic, on BiPAP, + mitts, calm 


HEENT: Mild icterus.  Oral mucosa very dry.


NECK:  Supple. 


LUNGS:  Decreased breath sounds at the bases.  Soft wheezes


HEART:  S1, S2, tachycardia, 110s, regular 


ABDOMEN:  Distended, moans to palpation, reproducible, + BS. Rectal tube in 

place


: [place]   


EXTREMITIES: Trace edema, no cyanosis.


DERMATOLOGIC:  Warm and dry.  No generalized rash.  


CENTRAL NERVOUS SYSTEM: Extremely lethargic


RUE-PICC, RIJ/Temp HDC & LIJ without signs of complications


General:  Other (ill appearing )


Heart:  Other (increased rate)


Lungs:  Crackles


Abdomen:  Other (obese,soft)


Extremities:  No edema, Other (SOME CLUBBING )


Skin:  No rashes, No breakdown, No significant lesion





Labs


LABS





Laboratory Tests








Test


 3/21/20


12:13 3/22/20


00:07 3/22/20


06:30 3/22/20


06:33


 


Glucose (Fingerstick)


 137 mg/dL


(70-99) 274 mg/dL


(70-99) 


 208 mg/dL


(70-99)


 


Sodium Level


 


 


 138 mmol/L


(136-145) 





 


Potassium Level


 


 


 3.5 mmol/L


(3.5-5.1) 





 


Chloride Level


 


 


 100 mmol/L


() 





 


Carbon Dioxide Level


 


 


 31 mmol/L


(21-32) 





 


Anion Gap   7 (6-14)  


 


Blood Urea Nitrogen


 


 


 36 mg/dL


(7-20) 





 


Creatinine


 


 


 4.0 mg/dL


(0.6-1.0) 





 


Estimated GFR


(Cockcroft-Gault) 


 


 11.9 


 





 


Glucose Level


 


 


 229 mg/dL


(70-99) 





 


Calcium Level


 


 


 6.6 mg/dL


(8.5-10.1) 





 


Phosphorus Level


 


 


 2.8 mg/dL


(2.6-4.7) 





 


Magnesium Level


 


 


 2.1 mg/dL


(1.8-2.4) 














Assessment and Plan


Assessmemt and Plan


Problems


Medical Problems:


(1) Acute pancreatitis


Status: Acute  





(2) Cholelithiasis


Status: Acute  











Comment


Review of Relevant


I have reviewed the following items josy (where applicable) has been applied.


Labs





Laboratory Tests








Test


 3/20/20


11:25 3/20/20


11:30 3/20/20


12:21 3/20/20


16:30


 


O2 Saturation 99 % (92-99)    93 % (92-99) 


 


Arterial Blood pH


 7.42


(7.35-7.45) 


 


 7.38


(7.35-7.45)


 


Arterial Blood pCO2 at


Patient Temp 53 mmHg


(35-46) 


 


 56 mmHg


(35-46)


 


Arterial Blood pO2 at Patient


Temp 136 mmHg


() 


 


 71 mmHg


()


 


Arterial Blood HCO3


 33 mmol/L


(21-28) 


 


 33 mmol/L


(21-28)


 


Arterial Blood Base Excess


 7 mmol/L


(-3-3) 


 


 7 mmol/L


(-3-3)


 


FiO2 60    40 


 


Potassium Level


 


 3.4 mmol/L


(3.5-5.1) 


 





 


Glucose (Fingerstick)


 


 


 135 mg/dL


(70-99) 





 


Test


 3/20/20


17:46 3/20/20


23:15 3/21/20


06:07 3/21/20


06:10


 


Glucose (Fingerstick)


 261 mg/dL


(70-99) 290 mg/dL


(70-99) 239 mg/dL


(70-99) 





 


White Blood Count


 


 


 


 15.0 x10^3/uL


(4.0-11.0)


 


Red Blood Count


 


 


 


 2.98 x10^6/uL


(3.50-5.40)


 


Hemoglobin


 


 


 


 9.9 g/dL


(12.0-15.5)


 


Hematocrit


 


 


 


 29.3 %


(36.0-47.0)


 


Mean Corpuscular Volume    98 fL () 


 


Mean Corpuscular Hemoglobin    33 pg (25-35) 


 


Mean Corpuscular Hemoglobin


Concent 


 


 


 34 g/dL


(31-37)


 


Red Cell Distribution Width


 


 


 


 13.4 %


(11.5-14.5)


 


Platelet Count


 


 


 


 164 x10^3/uL


(140-400)


 


Sodium Level


 


 


 


 138 mmol/L


(136-145)


 


Potassium Level


 


 


 


 3.5 mmol/L


(3.5-5.1)


 


Chloride Level


 


 


 


 102 mmol/L


()


 


Carbon Dioxide Level


 


 


 


 28 mmol/L


(21-32)


 


Anion Gap    8 (6-14) 


 


Blood Urea Nitrogen


 


 


 


 39 mg/dL


(7-20)


 


Creatinine


 


 


 


 4.8 mg/dL


(0.6-1.0)


 


Estimated GFR


(Cockcroft-Gault) 


 


 


 9.6 





 


Glucose Level


 


 


 


 263 mg/dL


(70-99)


 


Calcium Level


 


 


 


 6.6 mg/dL


(8.5-10.1)


 


Phosphorus Level


 


 


 


 3.4 mg/dL


(2.6-4.7)


 


Magnesium Level


 


 


 


 2.2 mg/dL


(1.8-2.4)


 


Test


 3/21/20


07:33 3/21/20


12:13 3/22/20


00:07 3/22/20


06:30


 


O2 Saturation 97 % (92-99)    


 


Arterial Blood pH


 7.28


(7.35-7.45) 


 


 





 


Arterial Blood pCO2 at


Patient Temp 54 mmHg


(35-46) 


 


 





 


Arterial Blood pO2 at Patient


Temp 107 mmHg


() 


 


 





 


Arterial Blood HCO3


 25 mmol/L


(21-28) 


 


 





 


Arterial Blood Base Excess


 -2 mmol/L


(-3-3) 


 


 





 


FiO2 40    


 


Glucose (Fingerstick)


 


 137 mg/dL


(70-99) 274 mg/dL


(70-99) 





 


Sodium Level


 


 


 


 138 mmol/L


(136-145)


 


Potassium Level


 


 


 


 3.5 mmol/L


(3.5-5.1)


 


Chloride Level


 


 


 


 100 mmol/L


()


 


Carbon Dioxide Level


 


 


 


 31 mmol/L


(21-32)


 


Anion Gap    7 (6-14) 


 


Blood Urea Nitrogen


 


 


 


 36 mg/dL


(7-20)


 


Creatinine


 


 


 


 4.0 mg/dL


(0.6-1.0)


 


Estimated GFR


(Cockcroft-Gault) 


 


 


 11.9 





 


Glucose Level


 


 


 


 229 mg/dL


(70-99)


 


Calcium Level


 


 


 


 6.6 mg/dL


(8.5-10.1)


 


Phosphorus Level


 


 


 


 2.8 mg/dL


(2.6-4.7)


 


Magnesium Level


 


 


 


 2.1 mg/dL


(1.8-2.4)


 


Test


 3/22/20


06:33 


 


 





 


Glucose (Fingerstick)


 208 mg/dL


(70-99) 


 


 











Laboratory Tests








Test


 3/21/20


12:13 3/22/20


00:07 3/22/20


06:30 3/22/20


06:33


 


Glucose (Fingerstick)


 137 mg/dL


(70-99) 274 mg/dL


(70-99) 


 208 mg/dL


(70-99)


 


Sodium Level


 


 


 138 mmol/L


(136-145) 





 


Potassium Level


 


 


 3.5 mmol/L


(3.5-5.1) 





 


Chloride Level


 


 


 100 mmol/L


() 





 


Carbon Dioxide Level


 


 


 31 mmol/L


(21-32) 





 


Anion Gap   7 (6-14)  


 


Blood Urea Nitrogen


 


 


 36 mg/dL


(7-20) 





 


Creatinine


 


 


 4.0 mg/dL


(0.6-1.0) 





 


Estimated GFR


(Cockcroft-Gault) 


 


 11.9 


 





 


Glucose Level


 


 


 229 mg/dL


(70-99) 





 


Calcium Level


 


 


 6.6 mg/dL


(8.5-10.1) 





 


Phosphorus Level


 


 


 2.8 mg/dL


(2.6-4.7) 





 


Magnesium Level


 


 


 2.1 mg/dL


(1.8-2.4) 











Microbiology


3/18/20 Blood Culture - Preliminary, Resulted


          NO GROWTH AFTER 4 DAYS


Medications





Current Medications


Sodium Chloride 1,000 ml @  1,000 mls/hr Q1H IV  Last administered on 3/16/20at 

03:00;  Start 3/16/20 at 03:00;  Stop 3/16/20 at 03:59;  Status DC


Ondansetron HCl (Zofran) 4 mg 1X  ONCE IVP  Last administered on 3/16/20at 

03:27;  Start 3/16/20 at 03:00;  Stop 3/16/20 at 03:01;  Status DC


Morphine Sulfate (Morphine Sulfate) 4 mg 1X  ONCE IV ;  Start 3/16/20 at 03:00; 

Stop 3/16/20 at 03:01;  Status Cancel


Ketorolac Tromethamine (Toradol 30mg Vial) 30 mg 1X  ONCE IV  Last administered 

on 3/16/20at 02:54;  Start 3/16/20 at 03:00;  Stop 3/16/20 at 03:01;  Status DC


Fentanyl Citrate (Fentanyl 2ml Vial) 25 mcg 1X  ONCE IVP  Last administered on 

3/16/20at 03:23;  Start 3/16/20 at 03:30;  Stop 3/16/20 at 03:31;  Status DC


Fentanyl Citrate (Fentanyl 2ml Vial) 100 mcg STK-MED ONCE .ROUTE ;  Start 

3/16/20 at 03:18;  Stop 3/16/20 at 03:18;  Status DC


Iohexol (Omnipaque 350 Mg/ml) 90 ml 1X  ONCE IV  Last administered on 3/16/20at 

03:25;  Start 3/16/20 at 03:30;  Stop 3/16/20 at 03:31;  Status DC


Info (CONTRAST GIVEN -- Rx MONITORING) 1 each PRN DAILY  PRN MC SEE COMMENTS;  

Start 3/16/20 at 03:30;  Stop 3/18/20 at 03:29;  Status DC


Hydromorphone HCl (Dilaudid) 0.5 mg 1X  ONCE IV  Last administered on 3/16/20at 

03:55;  Start 3/16/20 at 04:30;  Stop 3/16/20 at 04:32;  Status DC


Ondansetron HCl (Zofran) 4 mg PRN Q8HRS  PRN IV NAUSEA/VOMITING 1ST CHOICE;  

Start 3/16/20 at 05:00;  Stop 3/16/20 at 09:27;  Status DC


Morphine Sulfate (Morphine Sulfate) 2 mg PRN Q2HR  PRN IV SEVERE PAIN 7-10 Last 

administered on 3/17/20at 12:26;  Start 3/16/20 at 05:00;  Stop 3/17/20 at 

14:15;  Status DC


Sodium Chloride 1,000 ml @  125 mls/hr Q8H IV  Last administered on 3/16/20at 

20:56;  Start 3/16/20 at 05:00;  Stop 3/17/20 at 04:59;  Status DC


Hydromorphone HCl (Dilaudid) 0.5 mg PRN Q3HRS  PRN IV SEVERE PAIN 7-10 Last 

administered on 3/17/20at 10:06;  Start 3/16/20 at 05:00;  Stop 3/17/20 at 

12:01;  Status DC


Piperacillin Sod/ Tazobactam Sod 4.5 gm/Sodium Chloride 100 ml @  200 mls/hr 1X 

ONCE IV  Last administered on 3/16/20at 05:44;  Start 3/16/20 at 06:00;  Stop 

3/16/20 at 06:29;  Status DC


Ondansetron HCl (Zofran) 4 mg PRN Q4HRS  PRN IV NAUSEA/VOMITING 1ST CHOICE Last 

administered on 3/21/20at 16:15;  Start 3/16/20 at 09:30


Insulin Human Lispro (HumaLOG) 0-9 UNITS Q6HRS SQ  Last administered on 

3/22/20at 06:39;  Start 3/16/20 at 09:30


Dextrose (Dextrose 50%-Water Syringe) 12.5 gm PRN Q15MIN  PRN IV SEE COMMENTS;  

Start 3/16/20 at 09:30


Pantoprazole Sodium (PROTONIX VIAL for IV PUSH) 40 mg DAILYAC IVP  Last 

administered on 3/22/20at 07:57;  Start 3/16/20 at 11:30


Prochlorperazine Edisylate (Compazine) 10 mg PRN Q6HRS  PRN IV NAUSEA/VOMITING, 

2nd CHOICE Last administered on 3/17/20at 00:42;  Start 3/16/20 at 17:45


Atenolol (Tenormin) 100 mg DAILY PO ;  Start 3/17/20 at 09:00;  Stop 3/16/20 at 

20:08;  Status DC


Metoprolol Tartrate (Lopressor Vial) 2.5 mg Q6HRS IVP  Last administered on 

3/17/20at 05:51;  Start 3/16/20 at 20:15;  Stop 3/17/20 at 10:02;  Status DC


Metoprolol Tartrate (Lopressor Vial) 5 mg Q6HRS IVP  Last administered on 

3/22/20at 06:16;  Start 3/17/20 at 10:15


Hydromorphone HCl (Dilaudid) 1 mg PRN Q3HRS  PRN IV SEVERE PAIN 7-10 Last 

administered on 3/22/20at 06:15;  Start 3/17/20 at 12:00


Lidocaine HCl (Buffered Lidocaine 1%) 3 ml STK-MED ONCE .ROUTE ;  Start 3/17/20 

at 12:55;  Stop 3/17/20 at 12:56;  Status DC


Albumin Human 500 ml @  125 mls/hr 1X  ONCE IV  Last administered on 3/17/20at 

14:33;  Start 3/17/20 at 14:30;  Stop 3/17/20 at 18:32;  Status DC


Norepinephrine Bitartrate 8 mg/ Dextrose 258 ml @  17.299 mls/ hr CONT  PRN IV 

PER PROTOCOL Last administered on 3/20/20at 21:02;  Start 3/17/20 at 15:30


Sodium Chloride 1,000 ml @  125 mls/hr Q8H IV  Last administered on 3/17/20at 

21:04;  Start 3/17/20 at 16:00;  Stop 3/18/20 at 02:42;  Status DC


Albumin Human 500 ml @  125 mls/hr PRN BID  PRN IV After every 2L NSS & BP < 

90mm;  Start 3/17/20 at 16:00


Iohexol (Omnipaque 300 Mg/ml) 60 ml 1X  ONCE IV  Last administered on 3/17/20at 

17:20;  Start 3/17/20 at 17:00;  Stop 3/17/20 at 17:01;  Status DC


Info (CONTRAST GIVEN -- Rx MONITORING) 1 each PRN DAILY  PRN MC SEE COMMENTS;  

Start 3/17/20 at 17:00;  Stop 3/19/20 at 16:59;  Status DC


Meropenem 1 gm/ Sodium Chloride 100 ml @  200 mls/hr Q8HRS IV  Last administered

on 3/18/20at 05:45;  Start 3/17/20 at 20:00;  Stop 3/18/20 at 08:48;  Status DC


Furosemide (Lasix) 40 mg 1X  ONCE IVP  Last administered on 3/17/20at 22:12;  

Start 3/17/20 at 22:30;  Stop 3/17/20 at 22:31;  Status DC


Calcium Chloride 1000 mg/Sodium Chloride 110 ml @  220 mls/hr 1X  ONCE IV  Last 

administered on 3/17/20at 22:11;  Start 3/17/20 at 22:30;  Stop 3/17/20 at 

22:59;  Status DC


Albuterol Sulfate (Ventolin Neb Soln) 2.5 mg 1X  ONCE NEB  Last administered on 

3/18/20at 00:56;  Start 3/17/20 at 22:30;  Stop 3/17/20 at 22:31;  Status DC


Insulin Human Regular (HumuLIN R VIAL) 5 unit 1X  ONCE IV  Last administered on 

3/17/20at 22:14;  Start 3/17/20 at 22:30;  Stop 3/17/20 at 22:31;  Status DC


Magnesium Sulfate 50 ml @ 25 mls/hr 1X  ONCE IV  Last administered on 3/18/20at 

02:57;  Start 3/18/20 at 03:00;  Stop 3/18/20 at 04:59;  Status DC


Calcium Gluconate 1000 mg/Sodium Chloride 110 ml @  220 mls/hr 1X  ONCE IV  Last

administered on 3/18/20at 02:46;  Start 3/18/20 at 03:00;  Stop 3/18/20 at 

03:29;  Status DC


Sodium Chloride 1,000 ml @  200 mls/hr Q5H IV  Last administered on 3/18/20at 

02:46;  Start 3/18/20 at 03:00;  Stop 3/18/20 at 10:21;  Status DC


Calcium Gluconate 1000 mg/Sodium Chloride 110 ml @  220 mls/hr 1X  ONCE IV  Last

administered on 3/18/20at 03:21;  Start 3/18/20 at 03:30;  Stop 3/18/20 at 

03:59;  Status DC


Sodium Bicarbonate 50 meq/Sodium Chloride 1,050 ml @  75 mls/hr Q14H IV  Last 

administered on 3/22/20at 03:25;  Start 3/18/20 at 07:30


Calcium Gluconate 2000 mg/Sodium Chloride 120 ml @  220 mls/hr 1X  ONCE IV  Last

administered on 3/18/20at 09:05;  Start 3/18/20 at 07:30;  Stop 3/18/20 at 

08:02;  Status DC


Lidocaine HCl (Xylocaine-Mpf 1% 2ml Vial) 2 ml STK-MED ONCE .ROUTE ;  Start 

3/18/20 at 08:47;  Stop 3/18/20 at 08:47;  Status DC


Meropenem 500 mg/ Sodium Chloride 50 ml @  100 mls/hr Q12HR IV  Last 

administered on 3/22/20at 07:57;  Start 3/18/20 at 18:00


Lidocaine HCl (Buffered Lidocaine 1%) 3 ml STK-MED ONCE .ROUTE ;  Start 3/18/20 

at 09:46;  Stop 3/18/20 at 09:46;  Status DC


Lidocaine HCl (Buffered Lidocaine 1%) 6 ml 1X  ONCE INJ  Last administered on 

3/18/20at 10:26;  Start 3/18/20 at 10:15;  Stop 3/18/20 at 10:16;  Status DC


Info (Tpn Per Pharmacy) 1 each PRN DAILY  PRN MC SEE COMMENTS Last administered 

on 3/21/20at 12:39;  Start 3/18/20 at 12:00


Sodium Chloride 1,000 ml @  1,000 mls/hr Q1H PRN IV hypotension;  Start 3/18/20 

at 12:07;  Stop 3/18/20 at 18:06;  Status DC


Diphenhydramine HCl (Benadryl) 25 mg 1X PRN  PRN IV ITCHING;  Start 3/18/20 at 

12:15;  Stop 3/19/20 at 12:14;  Status DC


Diphenhydramine HCl (Benadryl) 25 mg 1X PRN  PRN IV ITCHING;  Start 3/18/20 at 

12:15;  Stop 3/19/20 at 12:14;  Status DC


Sodium Chloride 1,000 ml @  400 mls/hr Q2H30M PRN IV PATENCY;  Start 3/18/20 at 

12:07;  Stop 3/19/20 at 00:06;  Status DC


Info (PHARMACY MONITORING -- do not chart) 1 each PRN DAILY  PRN MC SEE 

COMMENTS;  Start 3/18/20 at 12:15;  Stop 3/20/20 at 08:13;  Status DC


Sodium Chloride 90 meq/Calcium Gluconate 10 meq/ Multivitamins 10 ml/Chromium/ 

Copper/Manganese/ Seleni/Zn 1 ml/ Total Parenteral Nutrition/Amino 

Acids/Dextrose/ Fat Emulsion Intravenous 55.005 ml  @ 2.292 mls/hr TPN  CONT IV 

;  Start 3/18/20 at 22:00;  Stop 3/18/20 at 12:33;  Status DC


Info (Tpn Per Pharmacy) 1 each PRN DAILY  PRN MC SEE COMMENTS;  Start 3/18/20 at

12:30;  Status UNV


Sodium Chloride 90 meq/Calcium Gluconate 10 meq/ Multivitamins 10 ml/Chromium/ 

Copper/Manganese/ Seleni/Zn 0.5 ml/ Total Parenteral Nutrition/Amino 

Acids/Dextrose/ Fat Emulsion Intravenous 1,512 ml @  63 mls/hr TPN  CONT IV  

Last administered on 3/18/20at 22:06;  Start 3/18/20 at 22:00;  Stop 3/19/20 at 

21:59;  Status DC


Calcium Carbonate/ Glycine (Tums) 500 mg PRN AFTMEALHC  PRN PO INDIGESTION;  

Start 3/18/20 at 17:45


Calcium Gluconate (Calcium Gluconate) 2,000 mg 1X  ONCE IVP  Last administered 

on 3/19/20at 02:19;  Start 3/19/20 at 02:15;  Stop 3/19/20 at 02:16;  Status DC


Calcium Chloride 3000 mg/Sodium Chloride 1,030 ml @  50 mls/hr [id number] IV  Last 

administered on 3/21/20at 02:17;  Start 3/19/20 at 08:00;  Stop 3/21/20 at 

15:23;  Status DC


Lorazepam (Ativan Inj) 1 mg PRN Q4HRS  PRN IVP ANXIETY / AGITATION Last 

administered on 3/22/20at 04:40;  Start 3/19/20 at 09:00


Sodium Chloride 1,000 ml @  1,000 mls/hr Q1H PRN IV hypotension;  Start 3/19/20 

at 08:56;  Stop 3/19/20 at 14:55;  Status DC


Albumin Human 200 ml @  200 mls/hr 1X PRN  PRN IV Hypotension;  Start 3/19/20 at

09:00;  Stop 3/19/20 at 14:59;  Status DC


Diphenhydramine HCl (Benadryl) 25 mg 1X PRN  PRN IV ITCHING;  Start 3/19/20 at 

09:00;  Stop 3/20/20 at 08:59;  Status DC


Diphenhydramine HCl (Benadryl) 25 mg 1X PRN  PRN IV ITCHING;  Start 3/19/20 at 

09:00;  Stop 3/20/20 at 08:59;  Status DC


Sodium Chloride 1,000 ml @  400 mls/hr Q2H30M PRN IV PATENCY;  Start 3/19/20 at 

08:56;  Stop 3/19/20 at 20:55;  Status DC


Info (PHARMACY MONITORING -- do not chart) 1 each PRN DAILY  PRN MC SEE 

COMMENTS;  Start 3/19/20 at 09:00;  Status UNV


Info (PHARMACY MONITORING -- do not chart) 1 each PRN DAILY  PRN MC SEE 

COMMENTS;  Start 3/19/20 at 09:00;  Stop 3/20/20 at 08:13;  Status DC


Digoxin (Lanoxin) 500 mcg 1X  ONCE IV  Last administered on 3/19/20at 10:04;  

Start 3/19/20 at 10:00;  Stop 3/19/20 at 10:01;  Status DC


Digoxin (Lanoxin) 125 mcg 1X  ONCE IV  Last administered on 3/19/20at 17:10;  

Start 3/19/20 at 18:00;  Stop 3/19/20 at 18:01;  Status DC


Magnesium Sulfate 100 ml @  25 mls/hr 1X  ONCE IV  Last administered on 

3/19/20at 12:48;  Start 3/19/20 at 13:00;  Stop 3/19/20 at 16:59;  Status DC


Sodium Chloride 90 meq/Magnesium Sulfate 10 meq/ Calcium Gluconate 20 meq/ 

Multivitamins 10 ml/Chromium/ Copper/Manganese/ Seleni/Zn 0.5 ml/ Total 

Parenteral Nutrition/Amino Acids/Dextrose/ Fat Emulsion Intravenous 1,512 ml @  

63 mls/hr TPN  CONT IV  Last administered on 3/19/20at 22:25;  Start 3/19/20 at 

22:00;  Stop 3/20/20 at 21:59;  Status DC


Sodium Chloride 1,000 ml @  1,000 mls/hr Q1H PRN IV hypotension;  Start 3/20/20 

at 08:05;  Stop 3/20/20 at 14:04;  Status DC


Albumin Human 200 ml @  200 mls/hr 1X  ONCE IV  Last administered on 3/20/20at 

08:57;  Start 3/20/20 at 08:15;  Stop 3/20/20 at 09:14;  Status DC


Diphenhydramine HCl (Benadryl) 25 mg 1X PRN  PRN IV ITCHING;  Start 3/20/20 at 

08:15;  Stop 3/21/20 at 08:14;  Status DC


Diphenhydramine HCl (Benadryl) 25 mg 1X PRN  PRN IV ITCHING;  Start 3/20/20 at 

08:15;  Stop 3/21/20 at 08:14;  Status DC


Sodium Chloride 1,000 ml @  400 mls/hr Q2H30M PRN IV PATENCY;  Start 3/20/20 at 

08:05;  Stop 3/20/20 at 20:04;  Status DC


Info (PHARMACY MONITORING -- do not chart) 1 each PRN DAILY  PRN MC SEE 

COMMENTS;  Start 3/20/20 at 08:15


Sodium Chloride 90 meq/Potassium Chloride 15 meq/ Potassium Phosphate 10 mmol/ 

Magnesium Sulfate 10 meq/Calcium Gluconate 20 meq/ Multivitamins 10 ml/Chromium/

Copper/Manganese/ Seleni/Zn 0.5 ml/ Total Parenteral Nutrition/Amino 

Acids/Dextrose/ Fat Emulsion Intravenous 1,512 ml @  63 mls/hr TPN  CONT IV  

Last administered on 3/20/20at 21:01;  Start 3/20/20 at 22:00;  Stop 3/21/20 at 

21:59;  Status DC


Potassium Chloride/Water 100 ml @  100 mls/hr 1X  ONCE IV  Last administered on 

3/20/20at 14:09;  Start 3/20/20 at 14:00;  Stop 3/20/20 at 14:59;  Status DC


Benzocaine (Hurricaine One) 1 spray 1X  ONCE MM  Last administered on 3/20/20at 

16:38;  Start 3/20/20 at 14:30;  Stop 3/20/20 at 14:31;  Status DC


Lidocaine HCl (Glydo (Lidocaine) Jelly) 1 thomas 1X  ONCE MM  Last administered on 

3/20/20at 16:38;  Start 3/20/20 at 14:30;  Stop 3/20/20 at 14:31;  Status DC


Linezolid/Dextrose 300 ml @  300 mls/hr Q12HR IV  Last administered on 3/22/20at

08:35;  Start 3/20/20 at 20:00


Acetaminophen (Tylenol) 650 mg PRN Q6HRS  PRN PO MILD PAIN / TEMP;  Start 

3/21/20 at 03:30;  Stop 3/21/20 at 03:36;  Status DC


Acetaminophen (Tylenol) 650 mg PRN Q6HRS  PRN PEG MILD PAIN / TEMP Last 

administered on 3/21/20at 16:36;  Start 3/21/20 at 03:36


Sodium Chloride 1,000 ml @  1,000 mls/hr Q1H PRN IV hypotension;  Start 3/21/20 

at 07:50;  Stop 3/21/20 at 13:49;  Status DC


Albumin Human 200 ml @  200 mls/hr 1X PRN  PRN IV Hypotension;  Start 3/21/20 at

08:00;  Stop 3/21/20 at 13:59;  Status DC


Sodium Chloride (Normal Saline Flush) 10 ml 1X PRN  PRN IV AP catheter pack;  

Start 3/21/20 at 08:00;  Stop 3/22/20 at 07:59;  Status DC


Sodium Chloride (Normal Saline Flush) 10 ml 1X PRN  PRN IV  catheter pack;  

Start 3/21/20 at 08:00;  Stop 3/22/20 at 07:59;  Status DC


Sodium Chloride 1,000 ml @  400 mls/hr Q2H30M PRN IV PATENCY;  Start 3/21/20 at 

07:50;  Stop 3/21/20 at 19:49;  Status DC


Info (PHARMACY MONITORING -- do not chart) 1 each PRN DAILY  PRN MC SEE 

COMMENTS;  Start 3/21/20 at 08:00;  Status UNV


Info (PHARMACY MONITORING -- do not chart) 1 each PRN DAILY  PRN MC SEE 

COMMENTS;  Start 3/21/20 at 08:00


Sodium Chloride 90 meq/Potassium Chloride 15 meq/ Potassium Phosphate 10 mmol/ 

Magnesium Sulfate 10 meq/Calcium Gluconate 20 meq/ Multivitamins 10 ml/Chromium/

Copper/Manganese/ Seleni/Zn 0.5 ml/ Total Parenteral Nutrition/Amino 

Acids/Dextrose/ Fat Emulsion Intravenous 1,512 ml @  63 mls/hr TPN  CONT IV  

Last administered on 3/21/20at 20:57;  Start 3/21/20 at 22:00;  Stop 3/22/20 at 

21:59





Active Scripts


Active


Reported


Bisoprolol Fumarate 5 Mg Tablet 10 Mg PO DAILY


Vitals/I & O





Vital Sign - Last 24 Hours








 3/21/20 3/21/20 3/21/20 3/21/20





 10:00 10:10 11:00 11:18


 


Pulse 124  137 


 


Resp 17 17 28 


 


B/P (MAP) 119/65 (83)  124/61 (82) 


 


Pulse Ox 98 98 97 98


 


O2 Delivery BiPAP/CPAP BiPAP/CPAP BiPAP/CPAP BiPAP/CPAP





 3/21/20 3/21/20 3/21/20 3/21/20





 12:00 12:00 12:00 12:49


 


Temp   99.0 





   99.0 


 


Pulse 139  137 


 


Resp   28 


 


B/P (MAP) 132/53 (79)  124/61 (82) 


 


Pulse Ox   97 98


 


O2 Delivery  Bi-pap BiPAP/CPAP BiPAP/CPAP


 


    





    





 3/21/20 3/21/20 3/21/20 3/21/20





 12:51 12:54 13:00 13:21


 


Pulse  140 117 


 


Resp 16  17 19


 


B/P (MAP)  121/55 120/58 (78) 


 


Pulse Ox 99  99 95


 


O2 Delivery BiPAP/CPAP  BiPAP/CPAP BiPAP/CPAP





 3/21/20 3/21/20 3/21/20 3/21/20





 14:00 15:00 15:28 16:00


 


Pulse 134 130  


 


Resp 17 20  


 


B/P (MAP) 144/56 (85) 119/51 (73)  


 


Pulse Ox 99 97 98 


 


O2 Delivery BiPAP/CPAP BiPAP/CPAP BiPAP/CPAP Bi-pap





 3/21/20 3/21/20 3/21/20 3/21/20





 16:00 16:00 16:24 17:00


 


Temp  101.6  





  101.6  


 


Pulse 139 139  134


 


Resp  22  21


 


B/P (MAP) 102/50 (67) 102/50 (67)  94/50 (65)


 


Pulse Ox  99 94 99


 


O2 Delivery  BiPAP/CPAP BiPAP/CPAP BiPAP/CPAP


 


    





    





 3/21/20 3/21/20 3/21/20 3/21/20





 17:41 17:43 18:00 18:11


 


Temp   99.6 





   99.6 


 


Pulse  122 108 


 


Resp 25  19 19


 


B/P (MAP)  100/53 90/52 (65) 


 


Pulse Ox 99  100 99


 


O2 Delivery BiPAP/CPAP  BiPAP/CPAP BiPAP/CPAP


 


    





    





 3/21/20 3/21/20 3/21/20 3/21/20





 19:00 20:00 20:00 20:00


 


Temp  99.4  





  99.4  


 


Pulse 108 108  


 


Resp 18 20  


 


B/P (MAP) 82/52 (62) 109/68 (82)  


 


Pulse Ox 97 100  


 


O2 Delivery BiPAP/CPAP BiPAP/CPAP Bi-pap 


 


    





    





 3/21/20 3/21/20 3/21/20 3/21/20





 20:15 20:58 21:00 21:31


 


Pulse   108 


 


Resp  24 16 16


 


B/P (MAP)   88/61 (70) 


 


Pulse Ox 100 100 100 100


 


O2 Delivery BiPAP/CPAP BiPAP/CPAP BiPAP/CPAP BiPAP/CPAP





 3/21/20 3/21/20 3/21/20 3/22/20





 22:00 22:26 23:00 00:00


 


Temp    99.0





    99.0


 


Pulse 109  115 105


 


Resp 15  25 21


 


B/P (MAP) 105/66 (79)  121/67 (85) 150/75 (100)


 


Pulse Ox 99 99 99 99


 


O2 Delivery BiPAP/CPAP  BiPAP/CPAP BiPAP/CPAP


 


    





    





 3/22/20 3/22/20 3/22/20 3/22/20





 00:00 00:00 00:04 00:04


 


Pulse   118 


 


Resp    26


 


B/P (MAP)   150/75 


 


Pulse Ox    99


 


O2 Delivery Bi-pap   BiPAP/CPAP





 3/22/20 3/22/20 3/22/20 3/22/20





 00:41 01:00 01:00 02:00


 


Pulse   112 118


 


Resp 16  18 18


 


B/P (MAP)   101/59 (73) 115/65 (82)


 


Pulse Ox 100 100 99 100


 


O2 Delivery BiPAP/CPAP BiPAP/CPAP BiPAP/CPAP BiPAP/CPAP





 3/22/20 3/22/20 3/22/20 3/22/20





 03:00 03:25 03:55 04:00


 


Pulse 122   


 


Resp 21 30 20 


 


B/P (MAP) 138/68 (91)   


 


Pulse Ox 99 94 97 


 


O2 Delivery BiPAP/CPAP BiPAP/CPAP BiPAP/CPAP 





 3/22/20 3/22/20 3/22/20 3/22/20





 04:00 04:00 04:01 05:00


 


Temp 99.5   





 99.5   


 


Pulse 128   130


 


Resp 20   22


 


B/P (MAP) 124/66 (85)   147/80 (102)


 


Pulse Ox 98  100 100


 


O2 Delivery BiPAP/CPAP Bi-pap BiPAP/CPAP BiPAP/CPAP


 


    





    





 3/22/20 3/22/20 3/22/20 3/22/20





 06:00 06:15 06:16 06:47


 


Pulse 126  128 


 


Resp 21 20  20


 


B/P (MAP) 110/69 (83)  110/68 


 


Pulse Ox 97 98  99


 


O2 Delivery BiPAP/CPAP BiPAP/CPAP  BiPAP/CPAP





 3/22/20 3/22/20 3/22/20 3/22/20





 07:00 07:29 08:00 08:00


 


Temp    100.2





    100.2


 


Pulse 118   114


 


Resp 19   20


 


B/P (MAP) 91/51 (64)   98/58 (71)


 


Pulse Ox 99 98  98


 


O2 Delivery BiPAP/CPAP BiPAP/CPAP Bi-pap BiPAP/CPAP


 


    





    





 3/22/20   





 09:00   


 


Pulse 121   


 


Resp 26   


 


B/P (MAP) 97/54 (68)   


 


Pulse Ox 98   


 


O2 Delivery BiPAP/CPAP   














Intake and Output   


 


 3/21/20 3/21/20 3/22/20





 15:00 23:00 07:00


 


Intake Total 350 ml 2220 ml 2120.6 ml


 


Output Total 20 ml 590 ml 55 ml


 


Balance 330 ml 1630 ml 2065.6 ml











Hemodynamically unstable?:  Yes


Is patient in severe pain?:  Yes


Is NPO status required?:  Yes











GAETANO GONCALVES MD        Mar 22, 2020 09:48

## 2020-03-22 NOTE — PDOC
Renal-Progress Notes


Subjective Notes


Notes


NO NEW COMPLAINTS





History of Present Illness


Hx of present illness


STILL ON BIPAP





Vitals


Vitals





Vital Signs








  Date Time  Temp Pulse Resp B/P (MAP) Pulse Ox O2 Delivery O2 Flow Rate FiO2


 


3/22/20 12:31   26  96 BiPAP/CPAP  


 


3/22/20 12:00 99.0 108  114/65 (81)    





 99.0       








Weight


Weight [ ]





I.O.


Intake and Output











Intake and Output 


 


 3/22/20





 07:00


 


Intake Total 4690.6 ml


 


Output Total 665 ml


 


Balance 4025.6 ml


 


 


 


Intake Oral 0 ml


 


IV Total 4690.6 ml


 


Output Urine Total 90 ml


 


Stool Total 300 ml


 


Gastric Drainage Total 275 ml











Labs


Labs





Laboratory Tests








Test


 3/22/20


00:07 3/22/20


06:30 3/22/20


06:33 3/22/20


11:50


 


Glucose (Fingerstick)


 274 mg/dL


(70-99) 


 208 mg/dL


(70-99) 216 mg/dL


(70-99)


 


Sodium Level


 


 138 mmol/L


(136-145) 


 





 


Potassium Level


 


 3.5 mmol/L


(3.5-5.1) 


 





 


Chloride Level


 


 100 mmol/L


() 


 





 


Carbon Dioxide Level


 


 31 mmol/L


(21-32) 


 





 


Anion Gap  7 (6-14)   


 


Blood Urea Nitrogen


 


 36 mg/dL


(7-20) 


 





 


Creatinine


 


 4.0 mg/dL


(0.6-1.0) 


 





 


Estimated GFR


(Cockcroft-Gault) 


 11.9 


 


 





 


Glucose Level


 


 229 mg/dL


(70-99) 


 





 


Calcium Level


 


 6.6 mg/dL


(8.5-10.1) 


 





 


Phosphorus Level


 


 2.8 mg/dL


(2.6-4.7) 


 





 


Magnesium Level


 


 2.1 mg/dL


(1.8-2.4) 


 














Micro


Micro





Microbiology


3/18/20 Blood Culture - Preliminary, Resulted


          NO GROWTH AFTER 4 DAYS





Review of Systems


Constitutional:  yes: other (DIFFICULT TO OBTAIN)





Physical Exam


General Appearance:  moderate distress


Skin:  warm


Respiratory:  decreased breath sounds


Heart:  S1S2


Abdomen:  soft, bowel sounds present


Genitourinary:  bladder flat


Extremities:  pulses present


Neurology:  alert





Assessment


Assessment


IMP





MEV-HDR-DODSVD


HYPERKALEMIA-BETTER


ACIDOSIS AND ACIDEMIA


ACUTE REPS FAILURE


ACUTE PANCREATITIS


HYPOALBUMINEMIA


HYPOCALCEMIA-BETTER





PLAN





TPN


HD TOMORROW


CONT HCO3 GTT


ON BIPAP


MAY NEED VENT SUPPORT


WILL FOLLOW











BETH HEIN MD                 Mar 22, 2020 12:57

## 2020-03-23 VITALS — SYSTOLIC BLOOD PRESSURE: 144 MMHG | DIASTOLIC BLOOD PRESSURE: 62 MMHG

## 2020-03-23 VITALS — DIASTOLIC BLOOD PRESSURE: 69 MMHG | SYSTOLIC BLOOD PRESSURE: 119 MMHG

## 2020-03-23 VITALS — DIASTOLIC BLOOD PRESSURE: 60 MMHG | SYSTOLIC BLOOD PRESSURE: 115 MMHG

## 2020-03-23 VITALS — DIASTOLIC BLOOD PRESSURE: 65 MMHG | SYSTOLIC BLOOD PRESSURE: 119 MMHG

## 2020-03-23 VITALS — SYSTOLIC BLOOD PRESSURE: 113 MMHG | DIASTOLIC BLOOD PRESSURE: 66 MMHG

## 2020-03-23 VITALS — SYSTOLIC BLOOD PRESSURE: 86 MMHG | DIASTOLIC BLOOD PRESSURE: 53 MMHG

## 2020-03-23 VITALS — DIASTOLIC BLOOD PRESSURE: 55 MMHG | SYSTOLIC BLOOD PRESSURE: 97 MMHG

## 2020-03-23 VITALS — SYSTOLIC BLOOD PRESSURE: 104 MMHG | DIASTOLIC BLOOD PRESSURE: 62 MMHG

## 2020-03-23 VITALS — DIASTOLIC BLOOD PRESSURE: 75 MMHG | SYSTOLIC BLOOD PRESSURE: 136 MMHG

## 2020-03-23 VITALS — SYSTOLIC BLOOD PRESSURE: 112 MMHG | DIASTOLIC BLOOD PRESSURE: 64 MMHG

## 2020-03-23 VITALS — DIASTOLIC BLOOD PRESSURE: 80 MMHG | SYSTOLIC BLOOD PRESSURE: 116 MMHG

## 2020-03-23 VITALS — SYSTOLIC BLOOD PRESSURE: 59 MMHG | DIASTOLIC BLOOD PRESSURE: 41 MMHG

## 2020-03-23 VITALS — SYSTOLIC BLOOD PRESSURE: 140 MMHG | DIASTOLIC BLOOD PRESSURE: 66 MMHG

## 2020-03-23 VITALS — SYSTOLIC BLOOD PRESSURE: 91 MMHG | DIASTOLIC BLOOD PRESSURE: 52 MMHG

## 2020-03-23 VITALS — DIASTOLIC BLOOD PRESSURE: 48 MMHG | SYSTOLIC BLOOD PRESSURE: 78 MMHG

## 2020-03-23 VITALS — SYSTOLIC BLOOD PRESSURE: 87 MMHG | DIASTOLIC BLOOD PRESSURE: 42 MMHG

## 2020-03-23 VITALS — SYSTOLIC BLOOD PRESSURE: 124 MMHG | DIASTOLIC BLOOD PRESSURE: 60 MMHG

## 2020-03-23 VITALS — DIASTOLIC BLOOD PRESSURE: 46 MMHG | SYSTOLIC BLOOD PRESSURE: 83 MMHG

## 2020-03-23 VITALS — DIASTOLIC BLOOD PRESSURE: 45 MMHG | SYSTOLIC BLOOD PRESSURE: 86 MMHG

## 2020-03-23 VITALS — DIASTOLIC BLOOD PRESSURE: 63 MMHG | SYSTOLIC BLOOD PRESSURE: 114 MMHG

## 2020-03-23 VITALS — SYSTOLIC BLOOD PRESSURE: 110 MMHG | DIASTOLIC BLOOD PRESSURE: 69 MMHG

## 2020-03-23 VITALS — DIASTOLIC BLOOD PRESSURE: 75 MMHG | SYSTOLIC BLOOD PRESSURE: 140 MMHG

## 2020-03-23 VITALS — SYSTOLIC BLOOD PRESSURE: 106 MMHG | DIASTOLIC BLOOD PRESSURE: 55 MMHG

## 2020-03-23 VITALS — DIASTOLIC BLOOD PRESSURE: 74 MMHG | SYSTOLIC BLOOD PRESSURE: 130 MMHG

## 2020-03-23 VITALS — DIASTOLIC BLOOD PRESSURE: 42 MMHG | SYSTOLIC BLOOD PRESSURE: 78 MMHG

## 2020-03-23 VITALS — DIASTOLIC BLOOD PRESSURE: 58 MMHG | SYSTOLIC BLOOD PRESSURE: 120 MMHG

## 2020-03-23 VITALS — SYSTOLIC BLOOD PRESSURE: 91 MMHG | DIASTOLIC BLOOD PRESSURE: 58 MMHG

## 2020-03-23 VITALS — DIASTOLIC BLOOD PRESSURE: 66 MMHG | SYSTOLIC BLOOD PRESSURE: 158 MMHG

## 2020-03-23 VITALS — SYSTOLIC BLOOD PRESSURE: 136 MMHG | DIASTOLIC BLOOD PRESSURE: 78 MMHG

## 2020-03-23 VITALS — SYSTOLIC BLOOD PRESSURE: 108 MMHG | DIASTOLIC BLOOD PRESSURE: 59 MMHG

## 2020-03-23 LAB
ALBUMIN SERPL-MCNC: 1.4 G/DL (ref 3.4–5)
ALBUMIN/GLOB SERPL: 0.4 {RATIO} (ref 1–1.7)
ALP SERPL-CCNC: 163 U/L (ref 46–116)
ALT SERPL-CCNC: 21 U/L (ref 14–59)
ANION GAP SERPL CALC-SCNC: 6 MMOL/L (ref 6–14)
ANION GAP SERPL CALC-SCNC: 8 MMOL/L (ref 6–14)
AST SERPL-CCNC: 37 U/L (ref 15–37)
BASE EXCESS ABG: -1 MMOL/L (ref -3–3)
BASE EXCESS ABG: 1 MMOL/L (ref -3–3)
BASE EXCESS ABG: 1 MMOL/L (ref -3–3)
BASE EXCESS ABG: 3 MMOL/L (ref -3–3)
BASOPHILS # BLD AUTO: 0 X10^3/UL (ref 0–0.2)
BASOPHILS NFR BLD: 0 % (ref 0–3)
BILIRUB SERPL-MCNC: 0.3 MG/DL (ref 0.2–1)
BUN SERPL-MCNC: 28 MG/DL (ref 7–20)
BUN SERPL-MCNC: 57 MG/DL (ref 7–20)
BUN/CREAT SERPL: 11 (ref 6–20)
CALCIUM SERPL-MCNC: 7.3 MG/DL (ref 8.5–10.1)
CALCIUM SERPL-MCNC: 7.5 MG/DL (ref 8.5–10.1)
CHLORIDE SERPL-SCNC: 101 MMOL/L (ref 98–107)
CHLORIDE SERPL-SCNC: 99 MMOL/L (ref 98–107)
CO2 SERPL-SCNC: 31 MMOL/L (ref 21–32)
CO2 SERPL-SCNC: 32 MMOL/L (ref 21–32)
CREAT SERPL-MCNC: 2.6 MG/DL (ref 0.6–1)
CREAT SERPL-MCNC: 5.1 MG/DL (ref 0.6–1)
EOSINOPHIL NFR BLD: 0.2 X10^3/UL (ref 0–0.7)
EOSINOPHIL NFR BLD: 1 % (ref 0–3)
ERYTHROCYTE [DISTWIDTH] IN BLOOD BY AUTOMATED COUNT: 14 % (ref 11.5–14.5)
GFR SERPLBLD BASED ON 1.73 SQ M-ARVRAT: 19.6 ML/MIN
GFR SERPLBLD BASED ON 1.73 SQ M-ARVRAT: 9 ML/MIN
GLOBULIN SER-MCNC: 3.4 G/DL (ref 2.2–3.8)
GLUCOSE SERPL-MCNC: 198 MG/DL (ref 70–99)
GLUCOSE SERPL-MCNC: 212 MG/DL (ref 70–99)
HCO3 BLDA-SCNC: 27 MMOL/L (ref 21–28)
HCO3 BLDA-SCNC: 28 MMOL/L (ref 21–28)
HCO3 BLDA-SCNC: 30 MMOL/L (ref 21–28)
HCO3 BLDA-SCNC: 30 MMOL/L (ref 21–28)
HCT VFR BLD CALC: 28.6 % (ref 36–47)
HGB BLD-MCNC: 9.5 G/DL (ref 12–15.5)
INSPIRATION SETTING TIME VENT: (no result)
INSPIRATION SETTING TIME VENT: (no result)
INSPIRATION SETTING TIME VENT: 100
LIPASE: 162 U/L (ref 73–393)
LYMPHOCYTES # BLD: 0.6 X10^3/UL (ref 1–4.8)
LYMPHOCYTES NFR BLD AUTO: 3 % (ref 24–48)
MAGNESIUM SERPL-MCNC: 2.2 MG/DL (ref 1.8–2.4)
MCH RBC QN AUTO: 33 PG (ref 25–35)
MCHC RBC AUTO-ENTMCNC: 33 G/DL (ref 31–37)
MCV RBC AUTO: 100 FL (ref 79–100)
MONO #: 1.1 X10^3/UL (ref 0–1.1)
MONOCYTES NFR BLD: 5 % (ref 0–9)
NEUT #: 20.7 X10^3/UL (ref 1.8–7.7)
NEUTROPHILS NFR BLD AUTO: 91 % (ref 31–73)
PCO2 BLDA: 40 MMHG (ref 35–46)
PCO2 BLDA: 71 MMHG (ref 35–46)
PCO2 BLDA: 72 MMHG (ref 35–46)
PCO2 BLDA: 78 MMHG (ref 35–46)
PHOSPHATE SERPL-MCNC: 2.5 MG/DL (ref 2.6–4.7)
PLATELET # BLD AUTO: 291 X10^3/UL (ref 140–400)
PO2 BLDA: 324 MMHG (ref 75–108)
PO2 BLDA: 45 MMHG (ref 75–108)
PO2 BLDA: 63 MMHG (ref 75–108)
PO2 BLDA: 75 MMHG (ref 75–108)
POTASSIUM SERPL-SCNC: 3.4 MMOL/L (ref 3.5–5.1)
POTASSIUM SERPL-SCNC: 4.1 MMOL/L (ref 3.5–5.1)
PROT SERPL-MCNC: 4.8 G/DL (ref 6.4–8.2)
RBC # BLD AUTO: 2.86 X10^6/UL (ref 3.5–5.4)
SAO2 % BLDA: 77 % (ref 92–99)
SAO2 % BLDA: 93 % (ref 92–99)
SAO2 % BLDA: 93 % (ref 92–99)
SAO2 % BLDA: 99 % (ref 92–99)
SODIUM SERPL-SCNC: 138 MMOL/L (ref 136–145)
SODIUM SERPL-SCNC: 139 MMOL/L (ref 136–145)
WBC # BLD AUTO: 22.6 X10^3/UL (ref 4–11)

## 2020-03-23 PROCEDURE — 02HV33Z INSERTION OF INFUSION DEVICE INTO SUPERIOR VENA CAVA, PERCUTANEOUS APPROACH: ICD-10-PCS | Performed by: RADIOLOGY

## 2020-03-23 PROCEDURE — B5181ZA FLUOROSCOPY OF SUPERIOR VENA CAVA USING LOW OSMOLAR CONTRAST, GUIDANCE: ICD-10-PCS | Performed by: RADIOLOGY

## 2020-03-23 PROCEDURE — B548ZZA ULTRASONOGRAPHY OF SUPERIOR VENA CAVA, GUIDANCE: ICD-10-PCS | Performed by: RADIOLOGY

## 2020-03-23 RX ADMIN — HEPARIN SODIUM SCH UNIT: 5000 INJECTION, SOLUTION INTRAVENOUS; SUBCUTANEOUS at 15:56

## 2020-03-23 RX ADMIN — DEXTROSE SCH MLS/HR: 50 INJECTION, SOLUTION INTRAVENOUS at 17:17

## 2020-03-23 RX ADMIN — INSULIN LISPRO SCH UNITS: 100 INJECTION, SOLUTION INTRAVENOUS; SUBCUTANEOUS at 12:00

## 2020-03-23 RX ADMIN — MEROPENEM SCH MLS/HR: 500 INJECTION, POWDER, FOR SOLUTION INTRAVENOUS at 13:33

## 2020-03-23 RX ADMIN — POTASSIUM CHLORIDE SCH MLS/HR: 2 INJECTION, SOLUTION, CONCENTRATE INTRAVENOUS at 15:01

## 2020-03-23 RX ADMIN — HYDROMORPHONE HYDROCHLORIDE PRN MG: 2 INJECTION INTRAMUSCULAR; INTRAVENOUS; SUBCUTANEOUS at 01:41

## 2020-03-23 RX ADMIN — POTASSIUM CHLORIDE SCH MLS/HR: 2 INJECTION, SOLUTION, CONCENTRATE INTRAVENOUS at 19:25

## 2020-03-23 RX ADMIN — METOPROLOL TARTRATE SCH MG: 5 INJECTION INTRAVENOUS at 23:47

## 2020-03-23 RX ADMIN — POTASSIUM CHLORIDE SCH MLS/HR: 2 INJECTION, SOLUTION, CONCENTRATE INTRAVENOUS at 15:02

## 2020-03-23 RX ADMIN — MIDAZOLAM HYDROCHLORIDE PRN MLS/HR: 5 INJECTION, SOLUTION INTRAMUSCULAR; INTRAVENOUS at 23:48

## 2020-03-23 RX ADMIN — METOPROLOL TARTRATE SCH MG: 5 INJECTION INTRAVENOUS at 18:28

## 2020-03-23 RX ADMIN — POTASSIUM CHLORIDE SCH MLS/HR: 2 INJECTION, SOLUTION, CONCENTRATE INTRAVENOUS at 15:03

## 2020-03-23 RX ADMIN — MIDAZOLAM HYDROCHLORIDE PRN MLS/HR: 5 INJECTION, SOLUTION INTRAMUSCULAR; INTRAVENOUS at 12:50

## 2020-03-23 RX ADMIN — MIDAZOLAM HYDROCHLORIDE PRN MLS/HR: 5 INJECTION, SOLUTION INTRAMUSCULAR; INTRAVENOUS at 08:54

## 2020-03-23 RX ADMIN — POTASSIUM CHLORIDE SCH MLS/HR: 2 INJECTION, SOLUTION, CONCENTRATE INTRAVENOUS at 19:22

## 2020-03-23 RX ADMIN — Medication PRN EACH: at 14:16

## 2020-03-23 RX ADMIN — HYDROMORPHONE HYDROCHLORIDE PRN MG: 2 INJECTION INTRAMUSCULAR; INTRAVENOUS; SUBCUTANEOUS at 05:13

## 2020-03-23 RX ADMIN — METOPROLOL TARTRATE SCH MG: 5 INJECTION INTRAVENOUS at 12:00

## 2020-03-23 RX ADMIN — MEROPENEM SCH MLS/HR: 500 INJECTION, POWDER, FOR SOLUTION INTRAVENOUS at 21:01

## 2020-03-23 RX ADMIN — INSULIN LISPRO SCH UNITS: 100 INJECTION, SOLUTION INTRAVENOUS; SUBCUTANEOUS at 06:01

## 2020-03-23 RX ADMIN — METOPROLOL TARTRATE SCH MG: 5 INJECTION INTRAVENOUS at 05:49

## 2020-03-23 RX ADMIN — INSULIN LISPRO SCH UNITS: 100 INJECTION, SOLUTION INTRAVENOUS; SUBCUTANEOUS at 18:32

## 2020-03-23 RX ADMIN — PANTOPRAZOLE SODIUM SCH MG: 40 INJECTION, POWDER, FOR SOLUTION INTRAVENOUS at 13:32

## 2020-03-23 RX ADMIN — INSULIN LISPRO SCH UNITS: 100 INJECTION, SOLUTION INTRAVENOUS; SUBCUTANEOUS at 00:40

## 2020-03-23 RX ADMIN — METOPROLOL TARTRATE SCH MG: 5 INJECTION INTRAVENOUS at 00:34

## 2020-03-23 RX ADMIN — AMIODARONE HYDROCHLORIDE PRN MLS/HR: 50 INJECTION, SOLUTION INTRAVENOUS at 06:51

## 2020-03-23 RX ADMIN — MIDAZOLAM HYDROCHLORIDE PRN MLS/HR: 5 INJECTION, SOLUTION INTRAMUSCULAR; INTRAVENOUS at 17:20

## 2020-03-23 RX ADMIN — HEPARIN SODIUM SCH UNIT: 5000 INJECTION, SOLUTION INTRAVENOUS; SUBCUTANEOUS at 22:13

## 2020-03-23 NOTE — NUR
ABG results called to Dr. Philip and ordered to change vent settings to 10 of PEEP and I:E 
1 to 1. Dr. Philip also notified that current O2 sats are in the low 80's.

## 2020-03-23 NOTE — RAD
Procedure: Ultrasound-guided placement left internal jugular central venous

catheter3/23/2020 7:10 AM



Clinical Indication:  VENOUS ACCESS



Discussion:





The risks and benefits of the procedure were discussed the patient and/or

their representative. Informed consent was obtained. A timeout procedure was

performed.



 All elements of maximal sterile barrier technique including the use of a cap,

mask, sterile gown, sterile gloves, large sterile sheet, appropriate hand

hygiene, and 2% chlorhexidine for cutaneous antisepsis (or acceptable

alternative antiseptic per current guidelines) were followed for this

procedure. The patient was prepped and draped in the usual sterile fashion. 

Ultrasound interrogation of the left neck revealed patency and compressibility

of the right internal jugular vein.  A 21-gauge micropuncture was then used to

gain access to this vein under ultrasound guidance.  A hard copy ultrasound

image was recorded. A guidewire was advanced centrally. 5 Kiswahili sheath was

placed. Over a wire following dilatation, a triple-lumen central venous

catheter was advanced centrally. Catheter was found to flush and aspirate

normally. Follow-up chest radiograph demonstrates tip at the cavoatrial

junction. Catheter secured in place and a sterile dressing was applied. No

immediate complications were identified.





Impression: Successful ultrasound-guided placement of left internal jugular

triple-lumen central venous catheter

## 2020-03-23 NOTE — RAD
Examination: Frontal view of the chest and frontal view of the abdomen

 

HISTORY: History of ET tube placement, pancreatitis

 

COMPARISON: 3/21/2020

 

FINDINGS:

The ET tube is identified in the trachea the level of the clavicles. The 

feeding tube is identified in the stomach. Left internal jugular line is 

identified. Right internal jugular dialysis catheter is identified. 

Right-sided PICC line is identified. Low lung volumes and technique 

accentuates heart and pulmonary vascularity. Mild prominent bilateral 

interstitial lung markings likely congestive changes with bibasilar lung 

airspace opacities likely atelectasis or infiltrates. Probable small left 

pleural effusion identified. Paucity of gas in the small bowel limits 

evaluation. Nonspecific bowel gas pattern

 

IMPRESSION:

 

 

1. Lines and tubes as described.

 

2. Bibasilar lung airspace opacities likely atelectasis or infiltrate with

small left pleural effusion. Probable mild congestive changes.

 

3. Nonspecific bowel gas pattern.

 

Electronically signed by: Logan Duran MD (3/23/2020 8:40 AM) [id number]

## 2020-03-23 NOTE — NUR
Pt intubated with 7.0 ET tube 20cm @ the lip by CRNA. Good color change noted and equal 
breath sounds heard.

## 2020-03-23 NOTE — NUR
Spoke with Rolf about pts condition and intubation. Informed of plan of care for today. Left 
message for mother to call when she gets the message. Attempted to leave a message for 
daughter Glenna but her phone message stated she is not taking calls or accepting messages 
at this time.

## 2020-03-23 NOTE — NUR
At around 0500 this am, this RN attempted to perform oral care on the patient. This RN 
removed pts bipap for less than 30 seconds and SPO2 dropped to 35%. Pt did recover fairly 
quickly once placed back on BiPap. Pt agonal breathing when off the BiPap. ABG drawn with 
critical results. Results called to Dr. Niño, on-call MD. Updated MD on pt status. 
Received orders to change BiPap settings to 22/6, back-up rate of 22 and titrate FiO2 to 
keep oxygen saturation at 90%. Repeat ABG this am. BiPap adjusted to new settings. Current 
FiO2 96%. 

PRN Levophed started this am at 0650 due to SBP less than 90. Will continue to monitor and 
pass on to next shift. 

-------------------------------------------------------------------------------

Addendum: 03/23/20 at 0732 by CHAGO IBARRA RN RN

-------------------------------------------------------------------------------

Current SPO2 96%. FiO2 45%.

## 2020-03-23 NOTE — PDOC
PULMONARY PROGRESS NOTES


Subjective


PT DID POORLY THIS AM INTUBATED


Vitals





Vital Signs








  Date Time  Temp Pulse Resp B/P (MAP) Pulse Ox O2 Delivery O2 Flow Rate FiO2


 


3/23/20 07:41      Bi-pap  


 


3/23/20 07:17     100   


 


3/23/20 06:50  116  83/46 (58)    


 


3/23/20 06:00   31     


 


3/23/20 04:00 97.7       





 97.7       


 


3/22/20 18:12       6.0 








HEENT:  Other (nc at perrl   bipap mask on   neck no lad   no thyromegaly)


Lungs:  Crackles, Other (dull at bases)


Cardiovascular:  S1, S2


Abdomen:  Other (distended,  diffuse pain   no mass)


Extremities:  No Edema


Skin:  Warm


Labs





Laboratory Tests








Test


 3/21/20


12:13 3/22/20


00:07 3/22/20


06:30 3/22/20


06:33


 


Glucose (Fingerstick)


 137 mg/dL


(70-99) 274 mg/dL


(70-99) 


 208 mg/dL


(70-99)


 


Sodium Level


 


 


 138 mmol/L


(136-145) 





 


Potassium Level


 


 


 3.5 mmol/L


(3.5-5.1) 





 


Chloride Level


 


 


 100 mmol/L


() 





 


Carbon Dioxide Level


 


 


 31 mmol/L


(21-32) 





 


Anion Gap   7 (6-14)  


 


Blood Urea Nitrogen


 


 


 36 mg/dL


(7-20) 





 


Creatinine


 


 


 4.0 mg/dL


(0.6-1.0) 





 


Estimated GFR


(Cockcroft-Gault) 


 


 11.9 


 





 


Glucose Level


 


 


 229 mg/dL


(70-99) 





 


Calcium Level


 


 


 6.6 mg/dL


(8.5-10.1) 





 


Phosphorus Level


 


 


 2.8 mg/dL


(2.6-4.7) 





 


Magnesium Level


 


 


 2.1 mg/dL


(1.8-2.4) 





 


Test


 3/22/20


11:50 3/22/20


18:18 3/23/20


00:38 3/23/20


05:32


 


Glucose (Fingerstick)


 216 mg/dL


(70-99) 223 mg/dL


(70-99) 260 mg/dL


(70-99) 





 


O2 Saturation    99 % (92-99) 


 


Arterial Blood pH


 


 


 


 7.20


(7.35-7.45)


 


Arterial Blood pCO2 at


Patient Temp 


 


 


 78 mmHg


(35-46)


 


Arterial Blood pO2 at Patient


Temp 


 


 


 324 mmHg


()


 


Arterial Blood HCO3


 


 


 


 30 mmol/L


(21-28)


 


Arterial Blood Base Excess


 


 


 


 1 mmol/L


(-3-3)


 


FiO2    100 


 


Test


 3/23/20


05:50 3/23/20


05:56 


 





 


White Blood Count


 22.6 x10^3/uL


(4.0-11.0) 


 


 





 


Red Blood Count


 2.86 x10^6/uL


(3.50-5.40) 


 


 





 


Hemoglobin


 9.5 g/dL


(12.0-15.5) 


 


 





 


Hematocrit


 28.6 %


(36.0-47.0) 


 


 





 


Mean Corpuscular Volume


 100 fL


() 


 


 





 


Mean Corpuscular Hemoglobin 33 pg (25-35)    


 


Mean Corpuscular Hemoglobin


Concent 33 g/dL


(31-37) 


 


 





 


Red Cell Distribution Width


 14.0 %


(11.5-14.5) 


 


 





 


Platelet Count


 291 x10^3/uL


(140-400) 


 


 





 


Neutrophils (%) (Auto) 91 % (31-73)    


 


Lymphocytes (%) (Auto) 3 % (24-48)    


 


Monocytes (%) (Auto) 5 % (0-9)    


 


Eosinophils (%) (Auto) 1 % (0-3)    


 


Basophils (%) (Auto) 0 % (0-3)    


 


Neutrophils # (Auto)


 20.7 x10^3/uL


(1.8-7.7) 


 


 





 


Lymphocytes # (Auto)


 0.6 x10^3/uL


(1.0-4.8) 


 


 





 


Monocytes # (Auto)


 1.1 x10^3/uL


(0.0-1.1) 


 


 





 


Eosinophils # (Auto)


 0.2 x10^3/uL


(0.0-0.7) 


 


 





 


Basophils # (Auto)


 0.0 x10^3/uL


(0.0-0.2) 


 


 





 


Sodium Level


 138 mmol/L


(136-145) 


 


 





 


Potassium Level


 4.1 mmol/L


(3.5-5.1) 


 


 





 


Chloride Level


 99 mmol/L


() 


 


 





 


Carbon Dioxide Level


 31 mmol/L


(21-32) 


 


 





 


Anion Gap 8 (6-14)    


 


Blood Urea Nitrogen


 57 mg/dL


(7-20) 


 


 





 


Creatinine


 5.1 mg/dL


(0.6-1.0) 


 


 





 


Estimated GFR


(Cockcroft-Gault) 9.0 


 


 


 





 


BUN/Creatinine Ratio 11 (6-20)    


 


Glucose Level


 212 mg/dL


(70-99) 


 


 





 


Calcium Level


 7.3 mg/dL


(8.5-10.1) 


 


 





 


Total Bilirubin


 0.3 mg/dL


(0.2-1.0) 


 


 





 


Aspartate Amino Transf


(AST/SGOT) 37 U/L (15-37) 


 


 


 





 


Alanine Aminotransferase


(ALT/SGPT) 21 U/L (14-59) 


 


 


 





 


Alkaline Phosphatase


 163 U/L


() 


 


 





 


Total Protein


 4.8 g/dL


(6.4-8.2) 


 


 





 


Albumin


 1.4 g/dL


(3.4-5.0) 


 


 





 


Albumin/Globulin Ratio 0.4 (1.0-1.7)    


 


Lipase


 162 U/L


() 


 


 





 


Glucose (Fingerstick)


 


 155 mg/dL


(70-99) 


 











Laboratory Tests








Test


 3/22/20


11:50 3/22/20


18:18 3/23/20


00:38 3/23/20


05:32


 


Glucose (Fingerstick)


 216 mg/dL


(70-99) 223 mg/dL


(70-99) 260 mg/dL


(70-99) 





 


O2 Saturation    99 % (92-99) 


 


Arterial Blood pH


 


 


 


 7.20


(7.35-7.45)


 


Arterial Blood pCO2 at


Patient Temp 


 


 


 78 mmHg


(35-46)


 


Arterial Blood pO2 at Patient


Temp 


 


 


 324 mmHg


()


 


Arterial Blood HCO3


 


 


 


 30 mmol/L


(21-28)


 


Arterial Blood Base Excess


 


 


 


 1 mmol/L


(-3-3)


 


FiO2    100 


 


Test


 3/23/20


05:50 3/23/20


05:56 


 





 


White Blood Count


 22.6 x10^3/uL


(4.0-11.0) 


 


 





 


Red Blood Count


 2.86 x10^6/uL


(3.50-5.40) 


 


 





 


Hemoglobin


 9.5 g/dL


(12.0-15.5) 


 


 





 


Hematocrit


 28.6 %


(36.0-47.0) 


 


 





 


Mean Corpuscular Volume


 100 fL


() 


 


 





 


Mean Corpuscular Hemoglobin 33 pg (25-35)    


 


Mean Corpuscular Hemoglobin


Concent 33 g/dL


(31-37) 


 


 





 


Red Cell Distribution Width


 14.0 %


(11.5-14.5) 


 


 





 


Platelet Count


 291 x10^3/uL


(140-400) 


 


 





 


Neutrophils (%) (Auto) 91 % (31-73)    


 


Lymphocytes (%) (Auto) 3 % (24-48)    


 


Monocytes (%) (Auto) 5 % (0-9)    


 


Eosinophils (%) (Auto) 1 % (0-3)    


 


Basophils (%) (Auto) 0 % (0-3)    


 


Neutrophils # (Auto)


 20.7 x10^3/uL


(1.8-7.7) 


 


 





 


Lymphocytes # (Auto)


 0.6 x10^3/uL


(1.0-4.8) 


 


 





 


Monocytes # (Auto)


 1.1 x10^3/uL


(0.0-1.1) 


 


 





 


Eosinophils # (Auto)


 0.2 x10^3/uL


(0.0-0.7) 


 


 





 


Basophils # (Auto)


 0.0 x10^3/uL


(0.0-0.2) 


 


 





 


Sodium Level


 138 mmol/L


(136-145) 


 


 





 


Potassium Level


 4.1 mmol/L


(3.5-5.1) 


 


 





 


Chloride Level


 99 mmol/L


() 


 


 





 


Carbon Dioxide Level


 31 mmol/L


(21-32) 


 


 





 


Anion Gap 8 (6-14)    


 


Blood Urea Nitrogen


 57 mg/dL


(7-20) 


 


 





 


Creatinine


 5.1 mg/dL


(0.6-1.0) 


 


 





 


Estimated GFR


(Cockcroft-Gault) 9.0 


 


 


 





 


BUN/Creatinine Ratio 11 (6-20)    


 


Glucose Level


 212 mg/dL


(70-99) 


 


 





 


Calcium Level


 7.3 mg/dL


(8.5-10.1) 


 


 





 


Total Bilirubin


 0.3 mg/dL


(0.2-1.0) 


 


 





 


Aspartate Amino Transf


(AST/SGOT) 37 U/L (15-37) 


 


 


 





 


Alanine Aminotransferase


(ALT/SGPT) 21 U/L (14-59) 


 


 


 





 


Alkaline Phosphatase


 163 U/L


() 


 


 





 


Total Protein


 4.8 g/dL


(6.4-8.2) 


 


 





 


Albumin


 1.4 g/dL


(3.4-5.0) 


 


 





 


Albumin/Globulin Ratio 0.4 (1.0-1.7)    


 


Lipase


 162 U/L


() 


 


 





 


Glucose (Fingerstick)


 


 155 mg/dL


(70-99) 


 











Medications





Active Scripts








 Medications  Dose


 Route/Sig


 Max Daily Dose Days Date Category


 


 Bisoprolol


 Fumarate 5 Mg


 Tablet  10 Mg


 PO DAILY


   3/16/20 Reported








Comments


1. Lines and tubes as described.


 


2. Bibasilar lung airspace opacities likely atelectasis or infiltrate with


small left pleural effusion. Probable mild congestive changes.


 


3. Nonspecific bowel gas pattern.





Impression


.


IMPRESSION:


1.  Acute hypoxemic respiratory failure secondary to acute pancreatitis, sepsis,

abdominal distention, and pneumonia.


2.  Gallstone pancreatitis. WITH NECROSIS


3.  Severe metabolic acidosis.


4.  Acute kidney injury. ON HD


5.  Acute gallstone pancreatitis.


6.  Hypoalbuminemia.


7.  Hypocalcemia.





Plan


.


AC MODE


REPEAT ABG


ANTI BX PER IS


FOLLOW SURGERY


HD PER NEPHRO


TPN





D/W STEVE GALLEGOS MD              Mar 23, 2020 08:23

## 2020-03-23 NOTE — RAD
Examination: Frontal view of the chest and frontal view of the abdomen

 

HISTORY: History of ET tube placement, pancreatitis

 

COMPARISON: 3/21/2020

 

FINDINGS:

The ET tube is identified in the trachea the level of the clavicles. The 

feeding tube is identified in the stomach. Left internal jugular line is 

identified. Right internal jugular dialysis catheter is identified. 

Right-sided PICC line is identified. Low lung volumes and technique 

accentuates heart and pulmonary vascularity. Mild prominent bilateral 

interstitial lung markings likely congestive changes with bibasilar lung 

airspace opacities likely atelectasis or infiltrates. Probable small left 

pleural effusion identified. Paucity of gas in the small bowel limits 

evaluation. Nonspecific bowel gas pattern

 

IMPRESSION:

 

 

1. Lines and tubes as described.

 

2. Bibasilar lung airspace opacities likely atelectasis or infiltrate with

small left pleural effusion. Probable mild congestive changes.

 

3. Nonspecific bowel gas pattern.

 

Electronically signed by: Logan Duran MD (3/23/2020 8:40 AM) [id number]

## 2020-03-23 NOTE — PDOC
Objective:


Objective:


D/w staff.


Vital Signs:





                                   Vital Signs








  Date Time  Temp Pulse Resp B/P (MAP) Pulse Ox O2 Delivery O2 Flow Rate FiO2


 


3/23/20 07:41      Bi-pap  


 


3/23/20 07:17     100   


 


3/23/20 06:50  116  83/46 (58)    


 


3/23/20 06:00   31     


 


3/23/20 04:00 97.7       





 97.7       


 


3/22/20 18:12       6.0 








Labs:





Laboratory Tests








Test


 3/22/20


11:50 3/22/20


18:18 3/23/20


00:38 3/23/20


05:32


 


Glucose (Fingerstick) 216 mg/dL  223 mg/dL  260 mg/dL  


 


O2 Saturation    99 % 


 


Arterial Blood pH    7.20 


 


Arterial Blood pCO2 at


Patient Temp 


 


 


 78 mmHg 





 


Arterial Blood pO2 at Patient


Temp 


 


 


 324 mmHg 





 


Arterial Blood HCO3    30 mmol/L 


 


Arterial Blood Base Excess    1 mmol/L 


 


FiO2    100 


 


Test


 3/23/20


05:50 3/23/20


05:56 


 





 


White Blood Count 22.6 x10^3/uL    


 


Red Blood Count 2.86 x10^6/uL    


 


Hemoglobin 9.5 g/dL    


 


Hematocrit 28.6 %    


 


Mean Corpuscular Volume 100 fL    


 


Mean Corpuscular Hemoglobin 33 pg    


 


Mean Corpuscular Hemoglobin


Concent 33 g/dL 


 


 


 





 


Red Cell Distribution Width 14.0 %    


 


Platelet Count 291 x10^3/uL    


 


Neutrophils (%) (Auto) 91 %    


 


Lymphocytes (%) (Auto) 3 %    


 


Monocytes (%) (Auto) 5 %    


 


Eosinophils (%) (Auto) 1 %    


 


Basophils (%) (Auto) 0 %    


 


Neutrophils # (Auto) 20.7 x10^3/uL    


 


Lymphocytes # (Auto) 0.6 x10^3/uL    


 


Monocytes # (Auto) 1.1 x10^3/uL    


 


Eosinophils # (Auto) 0.2 x10^3/uL    


 


Basophils # (Auto) 0.0 x10^3/uL    


 


Sodium Level 138 mmol/L    


 


Potassium Level 4.1 mmol/L    


 


Chloride Level 99 mmol/L    


 


Carbon Dioxide Level 31 mmol/L    


 


Anion Gap 8    


 


Blood Urea Nitrogen 57 mg/dL    


 


Creatinine 5.1 mg/dL    


 


Estimated GFR


(Cockcroft-Gault) 9.0 


 


 


 





 


BUN/Creatinine Ratio 11    


 


Glucose Level 212 mg/dL    


 


Calcium Level 7.3 mg/dL    


 


Total Bilirubin 0.3 mg/dL    


 


Aspartate Amino Transf


(AST/SGOT) 37 U/L 


 


 


 





 


Alanine Aminotransferase


(ALT/SGPT) 21 U/L 


 


 


 





 


Alkaline Phosphatase 163 U/L    


 


Total Protein 4.8 g/dL    


 


Albumin 1.4 g/dL    


 


Albumin/Globulin Ratio 0.4    


 


Lipase 162 U/L    


 


Glucose (Fingerstick)  155 mg/dL   








  BLOOD CULTURE  Final  


        NO GROWTH AFTER 5 DAYS


Imaging:


CXR/KUB 3/23


IMPRESSION:


1. Lines and tubes as described.


2. Bibasilar lung airspace opacities likely atelectasis or infiltrate with small

left pleural effusion. Probable mild congestive changes.


3. Nonspecific bowel gas pattern.





PE:





GEN: / Tom and nurse present - just intubated


LUNGS: vent


HEART: tachycardic on monitor


ABD: distended


NEURO/PSYCH: sedated





A/P:


Gallstone pancreatitis, MOSF





--


Now intubated, continue support.


Last  - ?stable enough to repeat





Hemodynamically unstable?:  Yes


Is patient in severe pain?:  Yes


Is NPO status required?:  Yes











RAGHAVENDRA MURCIA         Mar 23, 2020 09:30

## 2020-03-23 NOTE — NUR
Pt in bed on BiPap,  lungs are very diminished at this time. Skin is cool and mottled. Pt is 
unresponsive to deep nail bed pressure, pupils are sluggish and 2mm. Repeat ABG being done 
at this time.

## 2020-03-23 NOTE — NUR
Pharmacy TPN Dosing Note



S: JESENIA RICH is a 49 year old F Currently receiving Central Continuous TPN started 
03/18/20



B:Pertinent PMH: 

Necrotizing pancreatitis

Height: 5 feet, 8 inches

Weight: 109.974048 kg



Current diet: NPO 



LABS:

Sodium:    138 

Potassium: 4.1 

Chloride:  99 

Calcium:   7.3 

Corrected Calcium: 9.38 

Magnesium: 2.2 

CO2:       31 

SCr:       5.1 

Glucose:   155 

Albumin:   1.4 

AST:       56 

ALT:       45 



TPN FORMULA:

TPN TYPE:  Central Continuous

AMINO ACIDS:         125 gm

DEXTROSE:            195 gm

LIPIDS:              40 gm

SODIUM CHLORIDE:     90 mEq

SODIUM ACETATE:      -- mEq

SODIUM PHOSPHATE:    -- mmol

POTASSIUM CHLORIDE:  15 mEq

POTASSIUM ACETATE:   -- mEq

POTASSIUM PHOSPHATE: 10 mmol

MAGNESIUM:           10 mEq

CALCIUM:             20 mEq

INSULIN:             -- units

MULTIPLE VITAMIN:    10 ml

TRACE ELEMENTS:      0.5 ml(s)



TPN PLAN:  

increase aa to 125gm, lipid to 40 gm. increase volume to 1400 ml due to

increase in aa .





R: Continue TPN AT 58 ML/HR

Will monitor electrolytes, glucose, and tolerance to TPN.



 YENIFER STREETER [facility], 03/23/20 [id number]

## 2020-03-23 NOTE — PDOC
Infectious Disease Note


Subjective


Subjective


Fever Tmax 100.2


Remains on BiPAP FiO2 40%


On Levophed gtt this am


TPN





ROS


ROS


Not responsive





Vital Sign


Vital Signs





Vital Signs








  Date Time  Temp Pulse Resp B/P (MAP) Pulse Ox O2 Delivery O2 Flow Rate FiO2


 


3/23/20 07:17     100 BiPAP/CPAP  


 


3/23/20 06:50  116  83/46 (58)    


 


3/23/20 06:00   31     


 


3/23/20 04:00 97.7       





 97.7       


 


3/22/20 18:12       6.0 











Physical Exam


PHYSICAL EXAM


GENERAL: Lethargic, on BiPAP, + mitts, calm 


HEENT: Mild icterus.  Oral mucosa very dry.


NECK:  Supple. 


LUNGS:  Decreased breath sounds at the bases.  Soft wheezes


HEART:  S1, S2, tachycardia, 110s, regular 


ABDOMEN:  Distended, moans to palpation, reproducible, + BS. Rectal tube in 

place


: [place]   


EXTREMITIES: Trace edema, no cyanosis - mottled.


DERMATOLOGIC:  Warm and dry.  No generalized rash.  


CENTRAL NERVOUS SYSTEM: Extremely lethargic


RUE-PICC, RIJ/Temp HDC & LIJ without signs of complications





Labs


Lab





Laboratory Tests








Test


 3/22/20


11:50 3/22/20


18:18 3/23/20


00:38 3/23/20


05:32


 


Glucose (Fingerstick)


 216 mg/dL


(70-99) 223 mg/dL


(70-99) 260 mg/dL


(70-99) 





 


O2 Saturation    99 % (92-99) 


 


Arterial Blood pH


 


 


 


 7.20


(7.35-7.45)


 


Arterial Blood pCO2 at


Patient Temp 


 


 


 78 mmHg


(35-46)


 


Arterial Blood pO2 at Patient


Temp 


 


 


 324 mmHg


()


 


Arterial Blood HCO3


 


 


 


 30 mmol/L


(21-28)


 


Arterial Blood Base Excess


 


 


 


 1 mmol/L


(-3-3)


 


FiO2    100 


 


Test


 3/23/20


05:50 3/23/20


05:56 


 





 


White Blood Count


 22.6 x10^3/uL


(4.0-11.0) 


 


 





 


Red Blood Count


 2.86 x10^6/uL


(3.50-5.40) 


 


 





 


Hemoglobin


 9.5 g/dL


(12.0-15.5) 


 


 





 


Hematocrit


 28.6 %


(36.0-47.0) 


 


 





 


Mean Corpuscular Volume


 100 fL


() 


 


 





 


Mean Corpuscular Hemoglobin 33 pg (25-35)    


 


Mean Corpuscular Hemoglobin


Concent 33 g/dL


(31-37) 


 


 





 


Red Cell Distribution Width


 14.0 %


(11.5-14.5) 


 


 





 


Platelet Count


 291 x10^3/uL


(140-400) 


 


 





 


Neutrophils (%) (Auto) 91 % (31-73)    


 


Lymphocytes (%) (Auto) 3 % (24-48)    


 


Monocytes (%) (Auto) 5 % (0-9)    


 


Eosinophils (%) (Auto) 1 % (0-3)    


 


Basophils (%) (Auto) 0 % (0-3)    


 


Neutrophils # (Auto)


 20.7 x10^3/uL


(1.8-7.7) 


 


 





 


Lymphocytes # (Auto)


 0.6 x10^3/uL


(1.0-4.8) 


 


 





 


Monocytes # (Auto)


 1.1 x10^3/uL


(0.0-1.1) 


 


 





 


Eosinophils # (Auto)


 0.2 x10^3/uL


(0.0-0.7) 


 


 





 


Basophils # (Auto)


 0.0 x10^3/uL


(0.0-0.2) 


 


 





 


Sodium Level


 138 mmol/L


(136-145) 


 


 





 


Potassium Level


 4.1 mmol/L


(3.5-5.1) 


 


 





 


Chloride Level


 99 mmol/L


() 


 


 





 


Carbon Dioxide Level


 31 mmol/L


(21-32) 


 


 





 


Anion Gap 8 (6-14)    


 


Blood Urea Nitrogen


 57 mg/dL


(7-20) 


 


 





 


Creatinine


 5.1 mg/dL


(0.6-1.0) 


 


 





 


Estimated GFR


(Cockcroft-Gault) 9.0 


 


 


 





 


BUN/Creatinine Ratio 11 (6-20)    


 


Glucose Level


 212 mg/dL


(70-99) 


 


 





 


Calcium Level


 7.3 mg/dL


(8.5-10.1) 


 


 





 


Total Bilirubin


 0.3 mg/dL


(0.2-1.0) 


 


 





 


Aspartate Amino Transf


(AST/SGOT) 37 U/L (15-37) 


 


 


 





 


Alanine Aminotransferase


(ALT/SGPT) 21 U/L (14-59) 


 


 


 





 


Alkaline Phosphatase


 163 U/L


() 


 


 





 


Total Protein


 4.8 g/dL


(6.4-8.2) 


 


 





 


Albumin


 1.4 g/dL


(3.4-5.0) 


 


 





 


Albumin/Globulin Ratio 0.4 (1.0-1.7)    


 


Lipase


 162 U/L


() 


 


 





 


Glucose (Fingerstick)


 


 155 mg/dL


(70-99) 


 











Micro





Microbiology


3/18/20 Blood Culture - Final, Complete


          NO GROWTH AFTER 5 DAYS





Objective


Assessment


Fever


Acidosis


Hypotension - levophed started this am


Leukocytosis


Acute pancreatitis, early developing necrosis


Cholelithiasis


Leucocytosis - up


JED,Hyperkalemia, Metabolic acidosis on HD


Acute hypoxic resp failure, bilateral pleural effusion on BiPAP


Hypocalcemia 


Prediabetes


HTN





Plan


Plan of Care


Intubation pending


STAT CXR and KUB


CT ordered for this am


Add Flagyl and Micafungin


HD pending - in route


cont merrem, renal dosing and Zyvox (3/20) 


f/u labs and cults


No surgical plans at this time





Electrolytes per primary








Critically ill





D/w nursing





D/w RASHAWN Rees MD              Mar 23, 2020 07:58

## 2020-03-23 NOTE — NUR
SBP 60s-80s on CRRT with patient fluid removal rate at 100cc per hour and levophed gtt 
started.  Patient now being kept even on fluid removal rate.  Other VSS.  Will monitor.

## 2020-03-23 NOTE — PDOC
SUBJECTIVE


ROS


Intubated this am, On pressors





OBJECTIVE


Vital Signs





Vital Signs








  Date Time  Temp Pulse Resp B/P (MAP) Pulse Ox O2 Delivery O2 Flow Rate FiO2


 


3/23/20 07:41      Bi-pap  


 


3/23/20 07:17     100   


 


3/23/20 06:50  116  83/46 (58)    


 


3/23/20 06:00   31     


 


3/23/20 04:00 97.7       





 97.7       


 


3/22/20 18:12       6.0 








I & 0











Intake and Output 


 


 3/23/20





 07:00


 


Intake Total 3426.3 ml


 


Output Total 710 ml


 


Balance 2716.3 ml


 


 


 


IV Total 3426.3 ml


 


Output Urine Total 285 ml


 


Gastric Drainage Total 325 ml


 


Oral Regurgitation 100 ml











PHYSICAL EXAM


Physical Exam


GENERAL: Intubated on MV 


HEENT: Mild icterus, Intubated  


NECK:  Supple. 


LUNGS:  Decreased breath sounds at the bases.


HEART:  S1, S2, tachycardia, 110s,


ABDOMEN:  Distended, + BS. Rectal tube in place


: [place]  +


EXTREMITIES: Trace edema, no cyanosis - mottled.


DERMATOLOGIC:  Warm and dry.  No generalized rash.  


CENTRAL NERVOUS SYSTEM: Intubated  on MV 


RUE-PICC, RIJ/Temp HDC & LIJ





DIAGNOSIS/ASSESSMENT


Assessment & Plan


JED - ATN, Anuric, requiring HD 


 intubated and on pressors this am 


Seen on HD, tolerating , UF as 3-4 as tolerated , May need to switch to CRRT 





HyperKalemia- K normal  





 Acidosis -  Bicarb Normal- on higher side , Currently on on IV bicarb and TPN 

with bicarb  


DC IV bicarb 





HypoCalcemia- Corrected Ca normal  





 Acute pancreatitis - with necrosis/infection, likely gallstone pancreatitis. 

GI, ID, and general surgery  following  





Calculus of gallbladder with acute cholecystitis without obstruction - with 

secondary pancreatitis. Lap naif is indicated, will need to await pancreatitis 

resolution





HTN - Hypotensive currently and on pressors  





Sepsis - with acute pancreatitis as end organ dysfunction, sign of infection, 

zyvox, merrem





Hypoxia with respiratory failure - intubated this am  (3/23)





COMMENT/RELEVANT DATA


Meds





Current Medications








 Medications


  (Trade)  Dose


 Ordered  Sig/Yee  Start Time


 Stop Time Status Last Admin


Dose Admin


 


 Acetaminophen


  (Tylenol)  650 mg  PRN Q6HRS  PRN  3/21/20 03:36


    3/21/20 16:36


650 MG


 


 Albumin Human  200 ml @ 


 200 mls/hr  1X  ONCE  3/23/20 07:30


 3/23/20 08:29 DC 3/23/20 08:51


200 MLS/HR


 


 Albuterol Sulfate


  (Ventolin Neb


 Soln)  2.5 mg  1X  ONCE  3/17/20 22:30


 3/17/20 22:31 DC 3/18/20 00:56


2.5 MG


 


 Artificial Tears


  (Artificial


 Tears)  1 drop  PRN Q1HR  PRN  3/23/20 08:15


     





 


 Atenolol


  (Tenormin)  100 mg  DAILY  3/17/20 09:00


 3/16/20 20:08 DC  





 


 Benzocaine


  (Hurricaine One)  1 spray  1X  ONCE  3/20/20 14:30


 3/20/20 14:31 DC 3/20/20 16:38


1 SPRAY


 


 Calcium Carbonate/


 Glycine


  (Tums)  500 mg  PRN [id number]  PRN  3/18/20 17:45


     





 


 Calcium Chloride


 1000 mg/Sodium


 Chloride  110 ml @ 


 220 mls/hr  1X  ONCE  3/17/20 22:30


 3/17/20 22:59 DC 3/17/20 22:11


220 MLS/HR


 


 Calcium Chloride


 3000 mg/Sodium


 Chloride  1,030 ml @ 


 50 mls/hr  [id number]  3/19/20 08:00


 3/21/20 15:23 DC 3/21/20 02:17


50 MLS/HR


 


 Calcium Gluconate


  (Calcium


 Gluconate)  2,000 mg  1X  ONCE  3/19/20 02:15


 3/19/20 02:16 DC 3/19/20 02:19


2,000 MG


 


 Calcium Gluconate


 1000 mg/Sodium


 Chloride  110 ml @ 


 220 mls/hr  1X  ONCE  3/18/20 03:30


 3/18/20 03:59 DC 3/18/20 03:21


220 MLS/HR


 


 Calcium Gluconate


 2000 mg/Sodium


 Chloride  120 ml @ 


 220 mls/hr  1X  ONCE  3/18/20 07:30


 3/18/20 08:02 DC 3/18/20 09:05


220 MLS/HR


 


 Dextrose


  (Dextrose


 50%-Water Syringe)  12.5 gm  PRN Q15MIN  PRN  3/16/20 09:30


     





 


 Digoxin


  (Lanoxin)  125 mcg  1X  ONCE  3/19/20 18:00


 3/19/20 18:01 DC 3/19/20 17:10


125 MCG


 


 Diphenhydramine


 HCl


  (Benadryl)  25 mg  1X PRN  PRN  3/23/20 07:30


 3/24/20 07:29   





 


 Etomidate


  (Amidate)  8 mg  1X  ONCE  3/23/20 08:30


 3/23/20 08:31 DC 3/23/20 08:33


8 MG


 


 Fentanyl Citrate  30 ml @ 0


 mls/hr  CONT  PRN  3/23/20 08:15


    3/23/20 09:10


2.5 MLS/HR


 


 Fentanyl Citrate


  (Fentanyl 2ml


 Vial)  100 mcg  STK-MED ONCE  3/16/20 03:18


 3/16/20 03:18 DC  





 


 Furosemide


  (Lasix)  40 mg  1X  ONCE  3/17/20 22:30


 3/17/20 22:31 DC 3/17/20 22:12


40 MG


 


 Hydromorphone HCl


  (Dilaudid)  1 mg  PRN Q3HRS  PRN  3/17/20 12:00


    3/23/20 05:13


1 MG


 


 Info


  (CONTRAST GIVEN


 -- Rx MONITORING)  1 each  PRN DAILY  PRN  3/17/20 17:00


 3/19/20 16:59 DC  





 


 Info


  (PHARMACY


 MONITORING -- do


 not chart)  1 each  PRN DAILY  PRN  3/23/20 07:30


     





 


 Info


  (Tpn Per


 Pharmacy)  1 each  PRN DAILY  PRN  3/18/20 12:30


   UNV  





 


 Insulin Human


 Lispro


  (HumaLOG)  0-9 UNITS  Q6HRS  3/16/20 09:30


    3/23/20 06:01


4 UNITS


 


 Insulin Human


 Regular


  (HumuLIN R VIAL)  5 unit  1X  ONCE  3/17/20 22:30


 3/17/20 22:31 DC 3/17/20 22:14


5 UNIT


 


 Iohexol


  (Omnipaque 300


 Mg/ml)  60 ml  1X  ONCE  3/17/20 17:00


 3/17/20 17:01 DC 3/17/20 17:20


60 ML


 


 Iohexol


  (Omnipaque 350


 Mg/ml)  90 ml  1X  ONCE  3/16/20 03:30


 3/16/20 03:31 DC 3/16/20 03:25


90 ML


 


 Ketorolac


 Tromethamine


  (Toradol 30mg


 Vial)  30 mg  1X  ONCE  3/16/20 03:00


 3/16/20 03:01 DC 3/16/20 02:54


30 MG


 


 Lidocaine HCl


  (Buffered


 Lidocaine 1%)  6 ml  1X  ONCE  3/18/20 10:15


 3/18/20 10:16 DC 3/18/20 10:26


4 ML


 


 Lidocaine HCl


  (Glydo


  (Lidocaine) Jelly)  1 thomas  1X  ONCE  3/20/20 14:30


 3/20/20 14:31 DC 3/20/20 16:38


1 THOMAS


 


 Lidocaine HCl


  (Xylocaine-Mpf


 1% 2ml Vial)  2 ml  STK-MED ONCE  3/18/20 08:47


 3/18/20 08:47 DC  





 


 Linezolid/Dextrose  300 ml @ 


 300 mls/hr  Q12HR  3/20/20 20:00


    3/22/20 21:09


300 MLS/HR


 


 Lorazepam


  (Ativan Inj)  1 mg  PRN Q4HRS  PRN  3/19/20 09:00


    3/23/20 00:34


1 MG


 


 Magnesium Sulfate  100 ml @ 


 25 mls/hr  1X  ONCE  3/19/20 13:00


 3/19/20 16:59 DC 3/19/20 12:48


25 MLS/HR


 


 Meropenem 1 gm/


 Sodium Chloride  100 ml @ 


 200 mls/hr  Q8HRS  3/17/20 20:00


 3/18/20 08:48 DC 3/18/20 05:45


200 MLS/HR


 


 Meropenem 500 mg/


 Sodium Chloride  50 ml @ 


 100 mls/hr  Q12HR  3/18/20 18:00


    3/22/20 21:09


100 MLS/HR


 


 Metoprolol


 Tartrate


  (Lopressor Vial)  5 mg  Q6HRS  3/17/20 10:15


    3/23/20 05:49


5 MG


 


 Metronidazole  100 ml @ 


 100 mls/hr  Q6HRS  3/23/20 08:30


     





 


 Micafungin Sodium


 100 mg/Dextrose  100 ml @ 


 100 mls/hr  Q24H  3/23/20 09:00


     





 


 Midazolam HCl


  (Versed)  5 mg  1X  ONCE  3/23/20 08:30


 3/23/20 08:31 DC  





 


 Midazolam HCl 50


 mg/Sodium Chloride  50 ml @ 0


 mls/hr  CONT  PRN  3/23/20 08:15


    3/23/20 08:54


1 MLS/HR


 


 Morphine Sulfate


  (Morphine


 Sulfate)  2 mg  PRN Q2HR  PRN  3/16/20 05:00


 3/17/20 14:15 DC 3/17/20 12:26


2 MG


 


 Norepinephrine


 Bitartrate 8 mg/


 Dextrose  258 ml @ 


 17.299 mls/


 hr  CONT  PRN  3/17/20 15:30


    3/23/20 06:51


17.299 MLS/HR


 


 Ondansetron HCl


  (Zofran)  4 mg  PRN Q4HRS  PRN  3/16/20 09:30


    3/21/20 16:15


4 MG


 


 Pantoprazole


 Sodium


  (PROTONIX VIAL


 for IV PUSH)  40 mg  DAILYAC  3/16/20 11:30


    3/22/20 07:57


40 MG


 


 Piperacillin Sod/


 Tazobactam Sod


 4.5 gm/Sodium


 Chloride  100 ml @ 


 200 mls/hr  1X  ONCE  3/16/20 06:00


 3/16/20 06:29 DC 3/16/20 05:44


200 MLS/HR


 


 Potassium


 Chloride/Water  100 ml @ 


 100 mls/hr  1X  ONCE  3/20/20 14:00


 3/20/20 14:59 DC 3/20/20 14:09


100 MLS/HR


 


 Prochlorperazine


 Edisylate


  (Compazine)  10 mg  PRN Q6HRS  PRN  3/16/20 17:45


    3/17/20 00:42


10 MG


 


 Propofol  0 ml @ As


 Directed  STK-MED ONCE  3/23/20 07:53


 3/23/20 07:53 DC  





 


 Sodium


 Bicarbonate 50


 meq/Sodium


 Chloride  1,050 ml @ 


 75 mls/hr  Q14H  3/18/20 07:30


    3/22/20 21:10


75 MLS/HR


 


 Sodium Chloride  1,000 ml @ 


 400 mls/hr  Q2H30M PRN  3/23/20 07:28


 3/23/20 19:27   





 


 Sodium Chloride


  (Normal Saline


 Flush)  10 ml  1X PRN  PRN  3/21/20 08:00


 3/22/20 07:59 DC  





 


 Sodium Chloride


 90 meq/Calcium


 Gluconate 10 meq/


 Multivitamins 10


 ml/Chromium/


 Copper/Manganese/


 Seleni/Zn 0.5 ml/


 Total Parenteral


 Nutrition/Amino


 Acids/Dextrose/


 Fat Emulsion


 Intravenous  1,512 ml @ 


 63 mls/hr  TPN  CONT  3/18/20 22:00


 3/19/20 21:59 DC 3/18/20 22:06


63 MLS/HR


 


 Sodium Chloride


 90 meq/Calcium


 Gluconate 10 meq/


 Multivitamins 10


 ml/Chromium/


 Copper/Manganese/


 Seleni/Zn 1 ml/


 Total Parenteral


 Nutrition/Amino


 Acids/Dextrose/


 Fat Emulsion


 Intravenous  55.005 ml


  @ 2.292


 mls/hr  TPN  CONT  3/18/20 22:00


 3/18/20 12:33 DC  





 


 Sodium Chloride


 90 meq/Magnesium


 Sulfate 10 meq/


 Calcium Gluconate


 20 meq/


 Multivitamins 10


 ml/Chromium/


 Copper/Manganese/


 Seleni/Zn 0.5 ml/


 Total Parenteral


 Nutrition/Amino


 Acids/Dextrose/


 Fat Emulsion


 Intravenous  1,512 ml @ 


 63 mls/hr  TPN  CONT  3/19/20 22:00


 3/20/20 21:59 DC 3/19/20 22:25


63 MLS/HR


 


 Sodium Chloride


 90 meq/Potassium


 Chloride 15 meq/


 Potassium


 Phosphate 10 mmol/


 Magnesium Sulfate


 10 meq/Calcium


 Gluconate 20 meq/


 Multivitamins 10


 ml/Chromium/


 Copper/Manganese/


 Seleni/Zn 0.5 ml/


 Total Parenteral


 Nutrition/Amino


 Acids/Dextrose/


 Fat Emulsion


 Intravenous  1,200 ml @ 


 50 mls/hr  TPN  CONT  3/22/20 22:00


 3/23/20 21:59  3/22/20 23:29


50 MLS/HR


 


 Sodium Chloride


 90 meq/Potassium


 Chloride 15 meq/


 Potassium


 Phosphate 15 mmol/


 Magnesium Sulfate


 10 meq/Calcium


 Gluconate 20 meq/


 Multivitamins 10


 ml/Chromium/


 Copper/Manganese/


 Seleni/Zn 0.5 ml/


 Total Parenteral


 Nutrition/Amino


 Acids/Dextrose/


 Fat Emulsion


 Intravenous  1,200 ml @ 


 50 mls/hr  TPN  CONT  3/22/20 22:00


 3/22/20 14:17 DC  





 


 Succinylcholine


 Chloride


  (Anectine)  120 mg  1X  ONCE  3/23/20 08:30


 3/23/20 08:31 DC 3/23/20 08:34


120 MG








Lab





Laboratory Tests








Test


 3/22/20


11:50 3/22/20


18:18 3/23/20


00:38 3/23/20


05:32


 


Glucose (Fingerstick)


 216 mg/dL


(70-99) 223 mg/dL


(70-99) 260 mg/dL


(70-99) 





 


O2 Saturation    99 % (92-99) 


 


Arterial Blood pH


 


 


 


 7.20


(7.35-7.45)


 


Arterial Blood pCO2 at


Patient Temp 


 


 


 78 mmHg


(35-46)


 


Arterial Blood pO2 at Patient


Temp 


 


 


 324 mmHg


()


 


Arterial Blood HCO3


 


 


 


 30 mmol/L


(21-28)


 


Arterial Blood Base Excess


 


 


 


 1 mmol/L


(-3-3)


 


FiO2    100 


 


Test


 3/23/20


05:50 3/23/20


05:56 


 





 


White Blood Count


 22.6 x10^3/uL


(4.0-11.0) 


 


 





 


Red Blood Count


 2.86 x10^6/uL


(3.50-5.40) 


 


 





 


Hemoglobin


 9.5 g/dL


(12.0-15.5) 


 


 





 


Hematocrit


 28.6 %


(36.0-47.0) 


 


 





 


Mean Corpuscular Volume


 100 fL


() 


 


 





 


Mean Corpuscular Hemoglobin 33 pg (25-35)    


 


Mean Corpuscular Hemoglobin


Concent 33 g/dL


(31-37) 


 


 





 


Red Cell Distribution Width


 14.0 %


(11.5-14.5) 


 


 





 


Platelet Count


 291 x10^3/uL


(140-400) 


 


 





 


Neutrophils (%) (Auto) 91 % (31-73)    


 


Lymphocytes (%) (Auto) 3 % (24-48)    


 


Monocytes (%) (Auto) 5 % (0-9)    


 


Eosinophils (%) (Auto) 1 % (0-3)    


 


Basophils (%) (Auto) 0 % (0-3)    


 


Neutrophils # (Auto)


 20.7 x10^3/uL


(1.8-7.7) 


 


 





 


Lymphocytes # (Auto)


 0.6 x10^3/uL


(1.0-4.8) 


 


 





 


Monocytes # (Auto)


 1.1 x10^3/uL


(0.0-1.1) 


 


 





 


Eosinophils # (Auto)


 0.2 x10^3/uL


(0.0-0.7) 


 


 





 


Basophils # (Auto)


 0.0 x10^3/uL


(0.0-0.2) 


 


 





 


Sodium Level


 138 mmol/L


(136-145) 


 


 





 


Potassium Level


 4.1 mmol/L


(3.5-5.1) 


 


 





 


Chloride Level


 99 mmol/L


() 


 


 





 


Carbon Dioxide Level


 31 mmol/L


(21-32) 


 


 





 


Anion Gap 8 (6-14)    


 


Blood Urea Nitrogen


 57 mg/dL


(7-20) 


 


 





 


Creatinine


 5.1 mg/dL


(0.6-1.0) 


 


 





 


Estimated GFR


(Cockcroft-Gault) 9.0 


 


 


 





 


BUN/Creatinine Ratio 11 (6-20)    


 


Glucose Level


 212 mg/dL


(70-99) 


 


 





 


Calcium Level


 7.3 mg/dL


(8.5-10.1) 


 


 





 


Total Bilirubin


 0.3 mg/dL


(0.2-1.0) 


 


 





 


Aspartate Amino Transf


(AST/SGOT) 37 U/L (15-37) 


 


 


 





 


Alanine Aminotransferase


(ALT/SGPT) 21 U/L (14-59) 


 


 


 





 


Alkaline Phosphatase


 163 U/L


() 


 


 





 


Total Protein


 4.8 g/dL


(6.4-8.2) 


 


 





 


Albumin


 1.4 g/dL


(3.4-5.0) 


 


 





 


Albumin/Globulin Ratio 0.4 (1.0-1.7)    


 


Lipase


 162 U/L


() 


 


 





 


Glucose (Fingerstick)


 


 155 mg/dL


(70-99) 


 











Results


All relevant outside records, renal labs, imaging studies, telemetry/EKG's were 

reviewed.











KIMBERLY MYERS MD                Mar 23, 2020 10:07

## 2020-03-23 NOTE — PDOC
[facility] PROGRESS NOTE


Chief Complaint


Chief Complaint


Respiratory failure requiring intubation this morning


Severe Acute pancreatitis


Acute kidney failure now requiring dialysis


Salpingo--itis


Gallstones (Calculus of gallbladder with acute cholecystitis without 

obstruction)


HTN 


Leukocytosis 


Hypoxia


Uterine fibroid


Hypoxia with respiratory failure


Intractable pain


Intractable nausea





History of Present Illness


History of Present Illness


[id number]


Patient seen and examined in the ICU


She had to be intubated this morning


On assist-control 25/450/100% with 10 of PEEP and only satting 87%


She is extremely critically ill


I'm not sure if she will survive


Chart reviewed


Discussed with RN











Ms Tadeo is a 48yo F w/ PMHx HTN, prediabetes who presents the emergency room

complaints of abdominal pain. Patient described off and on 3 days. She states is

constant, described as a squeezing sensation in a band-like distribution. + 

nausea, vomiting.  She denies any fever or diarrhea.  Patient denies any 

abdominal surgical procedures.  She states is worse with movements, car ride.  

Pain initially was upper abdomen however now pretty much generalized.  Last 

bowel movement was 3/15/2020. Nothing makes her pain better.  Patient denies any

shortness of breath.  She does state the pain moves into her chest.  Denies any 

headache or visual changes.


Lipase 53577, , , Bilirubin 1.4.


CT abdomen confirms pancreatic inflammation, peripancreatic fluid and 

inflammatory changes around the pancreas consistent with pancreatitis. 

Cholelithiasis and 1.4cm uterine fibroid as well as possible left salpingitis. 

Admitted for further care


GI, General surgery, ID, Pulm consulted.





3/17: Overnight per report no urine output. Added dilaudid for pain, PICC placed

per IR. Renal US negative.Seen bedside in ICU, given 2L additional NSS and 

albumin infusion. Still hypotensive, started on levophed. Repeat CT abdomen with

necrosis. Updated her fiancee


3/18: Sats are only 87% on nasal cannula oxygen. Dialysis catheter per 

nephrology


3/19: She is now on BiPAP appears more ill, now on dialysis


3/20: Seen on BiPAP. Her mother and another family member are present and seemed

to be good support for her. Currently on dialysis. Appears critically ill


3/21: Overnight Tmax 101.7 , still on BiPAP FiO2 40%, still on low dose Levophed

gtt, TPN initiated. On dialysis





Overnight still febrile. Dialysis today. She wakes up and responds to pain. No 

CP.





Vitals/I&O


Vitals/I&O:





                                   Vital Signs








  Date Time  Temp Pulse Resp B/P (MAP) Pulse Ox O2 Delivery O2 Flow Rate FiO2


 


3/23/20 10:00  120 29 116/80 (92) 87 Ventilator  


 


3/23/20 08:00 100.4       





 100.4       


 


3/22/20 18:12       6.0 














                                    I & O   


 


 3/22/20 3/22/20 3/23/20





 15:00 23:00 07:00


 


Intake Total 350 ml 1583 ml 1493.3 ml


 


Output Total 60 ml 420 ml 230 ml


 


Balance 290 ml 1163 ml 1263.3 ml











Physical Exam


Physical Exam:


GENERAL: Lethargic, on BiPAP, + mitts, calm 


HEENT: Mild icterus.  Oral mucosa very dry.


NECK:  Supple. 


LUNGS:  Decreased breath sounds at the bases.  Soft wheezes


HEART:  S1, S2, tachycardia, 110s, regular 


ABDOMEN:  Distended, moans to palpation, reproducible, + BS. Rectal tube in 

place


: [place]   


EXTREMITIES: Trace edema, no cyanosis - mottled.


DERMATOLOGIC:  Warm and dry.  No generalized rash.  


CENTRAL NERVOUS SYSTEM: Extremely lethargic


RUE-PICC, RIJ/Temp HDC & LIJ without signs of complications


General:  Other (ill appearing )


Heart:  Other (increased rate)


Lungs:  Crackles, Other (dull at bases)


Abdomen:  Other (mildly distended, "doughy")


Extremities:  No edema, Other (SOME CLUBBING )


Skin:  No rashes, No breakdown, No significant lesion





Labs


Labs:





Laboratory Tests








Test


 3/22/20


11:50 3/22/20


18:18 3/23/20


00:38 3/23/20


05:32


 


Glucose (Fingerstick)


 216 mg/dL


(70-99) 223 mg/dL


(70-99) 260 mg/dL


(70-99) 





 


O2 Saturation    99 % (92-99) 


 


Arterial Blood pH


 


 


 


 7.20


(7.35-7.45)


 


Arterial Blood pCO2 at


Patient Temp 


 


 


 78 mmHg


(35-46)


 


Arterial Blood pO2 at Patient


Temp 


 


 


 324 mmHg


()


 


Arterial Blood HCO3


 


 


 


 30 mmol/L


(21-28)


 


Arterial Blood Base Excess


 


 


 


 1 mmol/L


(-3-3)


 


FiO2    100 


 


Test


 3/23/20


05:50 3/23/20


05:56 3/23/20


08:00 3/23/20


09:30


 


White Blood Count


 22.6 x10^3/uL


(4.0-11.0) 


 


 





 


Red Blood Count


 2.86 x10^6/uL


(3.50-5.40) 


 


 





 


Hemoglobin


 9.5 g/dL


(12.0-15.5) 


 


 





 


Hematocrit


 28.6 %


(36.0-47.0) 


 


 





 


Mean Corpuscular Volume


 100 fL


() 


 


 





 


Mean Corpuscular Hemoglobin 33 pg (25-35)    


 


Mean Corpuscular Hemoglobin


Concent 33 g/dL


(31-37) 


 


 





 


Red Cell Distribution Width


 14.0 %


(11.5-14.5) 


 


 





 


Platelet Count


 291 x10^3/uL


(140-400) 


 


 





 


Neutrophils (%) (Auto) 91 % (31-73)    


 


Lymphocytes (%) (Auto) 3 % (24-48)    


 


Monocytes (%) (Auto) 5 % (0-9)    


 


Eosinophils (%) (Auto) 1 % (0-3)    


 


Basophils (%) (Auto) 0 % (0-3)    


 


Neutrophils # (Auto)


 20.7 x10^3/uL


(1.8-7.7) 


 


 





 


Lymphocytes # (Auto)


 0.6 x10^3/uL


(1.0-4.8) 


 


 





 


Monocytes # (Auto)


 1.1 x10^3/uL


(0.0-1.1) 


 


 





 


Eosinophils # (Auto)


 0.2 x10^3/uL


(0.0-0.7) 


 


 





 


Basophils # (Auto)


 0.0 x10^3/uL


(0.0-0.2) 


 


 





 


Sodium Level


 138 mmol/L


(136-145) 


 


 





 


Potassium Level


 4.1 mmol/L


(3.5-5.1) 


 


 





 


Chloride Level


 99 mmol/L


() 


 


 





 


Carbon Dioxide Level


 31 mmol/L


(21-32) 


 


 





 


Anion Gap 8 (6-14)    


 


Blood Urea Nitrogen


 57 mg/dL


(7-20) 


 


 





 


Creatinine


 5.1 mg/dL


(0.6-1.0) 


 


 





 


Estimated GFR


(Cockcroft-Gault) 9.0 


 


 


 





 


BUN/Creatinine Ratio 11 (6-20)    


 


Glucose Level


 212 mg/dL


(70-99) 


 


 





 


Calcium Level


 7.3 mg/dL


(8.5-10.1) 


 


 





 


Total Bilirubin


 0.3 mg/dL


(0.2-1.0) 


 


 





 


Aspartate Amino Transf


(AST/SGOT) 37 U/L (15-37) 


 


 


 





 


Alanine Aminotransferase


(ALT/SGPT) 21 U/L (14-59) 


 


 


 





 


Alkaline Phosphatase


 163 U/L


() 


 


 





 


Total Protein


 4.8 g/dL


(6.4-8.2) 


 


 





 


Albumin


 1.4 g/dL


(3.4-5.0) 


 


 





 


Albumin/Globulin Ratio 0.4 (1.0-1.7)    


 


Lipase


 162 U/L


() 


 


 





 


Glucose (Fingerstick)


 


 155 mg/dL


(70-99) 


 





 


O2 Saturation   93 % (92-99)  77 % (92-99) 


 


Arterial Blood pH


 


 


 7.22


(7.35-7.45) 7.24


(7.35-7.45)


 


Arterial Blood pCO2 at


Patient Temp 


 


 71 mmHg


(35-46) 72 mmHg


(35-46)


 


Arterial Blood pO2 at Patient


Temp 


 


 75 mmHg


() 45 mmHg


()


 


Arterial Blood HCO3


 


 


 28 mmol/L


(21-28) 30 mmol/L


(21-28)


 


Arterial Blood Base Excess


 


 


 -1 mmol/L


(-3-3) 1 mmol/L


(-3-3)


 


FiO2   45 bipap  











Assessment and Plan


Assessmemt and Plan


Problems


Medical Problems:


(1) Acute pancreatitis


Status: Acute  





(2) Cholelithiasis


Status: Acut


Respiratory failure requiring intubation this morning 


Severe Acute pancreatitis


Acute kidney failure now requiring dialysis


Salpingo--itis


Gallstones (Calculus of gallbladder with acute cholecystitis without 

obstruction)


HTN 


Leukocytosis 


Hypoxia


Uterine fibroid


Hypoxia with respiratory failure


Intractable pain


Intractable nausea











Plan


Mechanical ventilation


Hemodialysis


BiPAP


ICU monitoring


Trend labs especially white count and lipase levels


We have consulted GI and general surgery


I consulted OB/GYN as well


IV antibiotics


IV fluids


IA narcotics


When necessary anti-medics


Home meds if possible


DVT prophylaxis


Full code


She is critically ill


Total time 32 minutes





Comment


Review of Relevant


I have reviewed the following items josy (where applicable) has been applied.


Medications:





Current Medications








 Medications


  (Trade)  Dose


 Ordered  Sig/Yee


 Route


 PRN Reason  Start Time


 Stop Time Status Last Admin


Dose Admin


 


 Sodium Chloride


 90 meq/Potassium


 Chloride 15 meq/


 Potassium


 Phosphate 10 mmol/


 Magnesium Sulfate


 10 meq/Calcium


 Gluconate 20 meq/


 Multivitamins 10


 ml/Chromium/


 Copper/Manganese/


 Seleni/Zn 0.5 ml/


 Total Parenteral


 Nutrition/Amino


 Acids/Dextrose/


 Fat Emulsion


 Intravenous  1,200 ml @ 


 50 mls/hr  TPN  CONT


 IV


   3/22/20 22:00


 3/23/20 21:59  3/22/20 23:29





 


 Albumin Human  200 ml @ 


 200 mls/hr  1X  ONCE


 IV


   3/23/20 07:30


 3/23/20 08:29 DC 3/23/20 08:51





 


 Fentanyl Citrate  30 ml @ 0


 mls/hr  CONT  PRN


 IV


 SEE PROTOCOL  3/23/20 08:15


    3/23/20 09:10





 


 Midazolam HCl 50


 mg/Sodium Chloride  50 ml @ 0


 mls/hr  CONT  PRN


 IV


 SEE PROTOCOL  3/23/20 08:15


    3/23/20 08:54





 


 Etomidate


  (Amidate)  8 mg  1X  ONCE


 IV


   3/23/20 08:30


 3/23/20 08:31 DC 3/23/20 08:33





 


 Succinylcholine


 Chloride


  (Anectine)  120 mg  1X  ONCE


 IV


   3/23/20 08:30


 3/23/20 08:31 DC 3/23/20 08:34














Hemodynamically unstable?:  Yes


Is patient in severe pain?:  Yes


Is NPO status required?:  Yes











HECTOR MASON III DO           Mar 23, 2020 11:30

## 2020-03-23 NOTE — NUR
SS following up with discharge planning. SS discussed with RNEbony. Pt currently on the 
vent and CRRT. SS will continue to follow for discharge planning.

## 2020-03-23 NOTE — PDOC
SURGICAL PROGRESS NOTE


Subjective


intubated 


on pressors


Vital Signs





Vital Signs








  Date Time  Temp Pulse Resp B/P (MAP) Pulse Ox O2 Delivery O2 Flow Rate FiO2


 


3/23/20 07:41      Bi-pap  


 


3/23/20 07:17     100   


 


3/23/20 06:50  116  83/46 (58)    


 


3/23/20 06:00   31     


 


3/23/20 04:00 97.7       





 97.7       


 


3/22/20 18:12       6.0 








I&O











Intake and Output 


 


 3/23/20





 07:00


 


Intake Total 3426.3 ml


 


Output Total 710 ml


 


Balance 2716.3 ml


 


 


 


IV Total 3426.3 ml


 


Output Urine Total 285 ml


 


Gastric Drainage Total 325 ml


 


Oral Regurgitation 100 ml








PATIENT HAS A VILLASENOR:  Yes


Abdomen:  Other (mildly distended, "doughy")


Labs





Laboratory Tests








Test


 3/21/20


12:13 3/22/20


00:07 3/22/20


06:30 3/22/20


06:33


 


Glucose (Fingerstick)


 137 mg/dL


(70-99) 274 mg/dL


(70-99) 


 208 mg/dL


(70-99)


 


Sodium Level


 


 


 138 mmol/L


(136-145) 





 


Potassium Level


 


 


 3.5 mmol/L


(3.5-5.1) 





 


Chloride Level


 


 


 100 mmol/L


() 





 


Carbon Dioxide Level


 


 


 31 mmol/L


(21-32) 





 


Anion Gap   7 (6-14)  


 


Blood Urea Nitrogen


 


 


 36 mg/dL


(7-20) 





 


Creatinine


 


 


 4.0 mg/dL


(0.6-1.0) 





 


Estimated GFR


(Cockcroft-Gault) 


 


 11.9 


 





 


Glucose Level


 


 


 229 mg/dL


(70-99) 





 


Calcium Level


 


 


 6.6 mg/dL


(8.5-10.1) 





 


Phosphorus Level


 


 


 2.8 mg/dL


(2.6-4.7) 





 


Magnesium Level


 


 


 2.1 mg/dL


(1.8-2.4) 





 


Test


 3/22/20


11:50 3/22/20


18:18 3/23/20


00:38 3/23/20


05:32


 


Glucose (Fingerstick)


 216 mg/dL


(70-99) 223 mg/dL


(70-99) 260 mg/dL


(70-99) 





 


O2 Saturation    99 % (92-99) 


 


Arterial Blood pH


 


 


 


 7.20


(7.35-7.45)


 


Arterial Blood pCO2 at


Patient Temp 


 


 


 78 mmHg


(35-46)


 


Arterial Blood pO2 at Patient


Temp 


 


 


 324 mmHg


()


 


Arterial Blood HCO3


 


 


 


 30 mmol/L


(21-28)


 


Arterial Blood Base Excess


 


 


 


 1 mmol/L


(-3-3)


 


FiO2    100 


 


Test


 3/23/20


05:50 3/23/20


05:56 


 





 


White Blood Count


 22.6 x10^3/uL


(4.0-11.0) 


 


 





 


Red Blood Count


 2.86 x10^6/uL


(3.50-5.40) 


 


 





 


Hemoglobin


 9.5 g/dL


(12.0-15.5) 


 


 





 


Hematocrit


 28.6 %


(36.0-47.0) 


 


 





 


Mean Corpuscular Volume


 100 fL


() 


 


 





 


Mean Corpuscular Hemoglobin 33 pg (25-35)    


 


Mean Corpuscular Hemoglobin


Concent 33 g/dL


(31-37) 


 


 





 


Red Cell Distribution Width


 14.0 %


(11.5-14.5) 


 


 





 


Platelet Count


 291 x10^3/uL


(140-400) 


 


 





 


Neutrophils (%) (Auto) 91 % (31-73)    


 


Lymphocytes (%) (Auto) 3 % (24-48)    


 


Monocytes (%) (Auto) 5 % (0-9)    


 


Eosinophils (%) (Auto) 1 % (0-3)    


 


Basophils (%) (Auto) 0 % (0-3)    


 


Neutrophils # (Auto)


 20.7 x10^3/uL


(1.8-7.7) 


 


 





 


Lymphocytes # (Auto)


 0.6 x10^3/uL


(1.0-4.8) 


 


 





 


Monocytes # (Auto)


 1.1 x10^3/uL


(0.0-1.1) 


 


 





 


Eosinophils # (Auto)


 0.2 x10^3/uL


(0.0-0.7) 


 


 





 


Basophils # (Auto)


 0.0 x10^3/uL


(0.0-0.2) 


 


 





 


Sodium Level


 138 mmol/L


(136-145) 


 


 





 


Potassium Level


 4.1 mmol/L


(3.5-5.1) 


 


 





 


Chloride Level


 99 mmol/L


() 


 


 





 


Carbon Dioxide Level


 31 mmol/L


(21-32) 


 


 





 


Anion Gap 8 (6-14)    


 


Blood Urea Nitrogen


 57 mg/dL


(7-20) 


 


 





 


Creatinine


 5.1 mg/dL


(0.6-1.0) 


 


 





 


Estimated GFR


(Cockcroft-Gault) 9.0 


 


 


 





 


BUN/Creatinine Ratio 11 (6-20)    


 


Glucose Level


 212 mg/dL


(70-99) 


 


 





 


Calcium Level


 7.3 mg/dL


(8.5-10.1) 


 


 





 


Total Bilirubin


 0.3 mg/dL


(0.2-1.0) 


 


 





 


Aspartate Amino Transf


(AST/SGOT) 37 U/L (15-37) 


 


 


 





 


Alanine Aminotransferase


(ALT/SGPT) 21 U/L (14-59) 


 


 


 





 


Alkaline Phosphatase


 163 U/L


() 


 


 





 


Total Protein


 4.8 g/dL


(6.4-8.2) 


 


 





 


Albumin


 1.4 g/dL


(3.4-5.0) 


 


 





 


Albumin/Globulin Ratio 0.4 (1.0-1.7)    


 


Lipase


 162 U/L


() 


 


 





 


Glucose (Fingerstick)


 


 155 mg/dL


(70-99) 


 











Laboratory Tests








Test


 3/22/20


11:50 3/22/20


18:18 3/23/20


00:38 3/23/20


05:32


 


Glucose (Fingerstick)


 216 mg/dL


(70-99) 223 mg/dL


(70-99) 260 mg/dL


(70-99) 





 


O2 Saturation    99 % (92-99) 


 


Arterial Blood pH


 


 


 


 7.20


(7.35-7.45)


 


Arterial Blood pCO2 at


Patient Temp 


 


 


 78 mmHg


(35-46)


 


Arterial Blood pO2 at Patient


Temp 


 


 


 324 mmHg


()


 


Arterial Blood HCO3


 


 


 


 30 mmol/L


(21-28)


 


Arterial Blood Base Excess


 


 


 


 1 mmol/L


(-3-3)


 


FiO2    100 


 


Test


 3/23/20


05:50 3/23/20


05:56 


 





 


White Blood Count


 22.6 x10^3/uL


(4.0-11.0) 


 


 





 


Red Blood Count


 2.86 x10^6/uL


(3.50-5.40) 


 


 





 


Hemoglobin


 9.5 g/dL


(12.0-15.5) 


 


 





 


Hematocrit


 28.6 %


(36.0-47.0) 


 


 





 


Mean Corpuscular Volume


 100 fL


() 


 


 





 


Mean Corpuscular Hemoglobin 33 pg (25-35)    


 


Mean Corpuscular Hemoglobin


Concent 33 g/dL


(31-37) 


 


 





 


Red Cell Distribution Width


 14.0 %


(11.5-14.5) 


 


 





 


Platelet Count


 291 x10^3/uL


(140-400) 


 


 





 


Neutrophils (%) (Auto) 91 % (31-73)    


 


Lymphocytes (%) (Auto) 3 % (24-48)    


 


Monocytes (%) (Auto) 5 % (0-9)    


 


Eosinophils (%) (Auto) 1 % (0-3)    


 


Basophils (%) (Auto) 0 % (0-3)    


 


Neutrophils # (Auto)


 20.7 x10^3/uL


(1.8-7.7) 


 


 





 


Lymphocytes # (Auto)


 0.6 x10^3/uL


(1.0-4.8) 


 


 





 


Monocytes # (Auto)


 1.1 x10^3/uL


(0.0-1.1) 


 


 





 


Eosinophils # (Auto)


 0.2 x10^3/uL


(0.0-0.7) 


 


 





 


Basophils # (Auto)


 0.0 x10^3/uL


(0.0-0.2) 


 


 





 


Sodium Level


 138 mmol/L


(136-145) 


 


 





 


Potassium Level


 4.1 mmol/L


(3.5-5.1) 


 


 





 


Chloride Level


 99 mmol/L


() 


 


 





 


Carbon Dioxide Level


 31 mmol/L


(21-32) 


 


 





 


Anion Gap 8 (6-14)    


 


Blood Urea Nitrogen


 57 mg/dL


(7-20) 


 


 





 


Creatinine


 5.1 mg/dL


(0.6-1.0) 


 


 





 


Estimated GFR


(Cockcroft-Gault) 9.0 


 


 


 





 


BUN/Creatinine Ratio 11 (6-20)    


 


Glucose Level


 212 mg/dL


(70-99) 


 


 





 


Calcium Level


 7.3 mg/dL


(8.5-10.1) 


 


 





 


Total Bilirubin


 0.3 mg/dL


(0.2-1.0) 


 


 





 


Aspartate Amino Transf


(AST/SGOT) 37 U/L (15-37) 


 


 


 





 


Alanine Aminotransferase


(ALT/SGPT) 21 U/L (14-59) 


 


 


 





 


Alkaline Phosphatase


 163 U/L


() 


 


 





 


Total Protein


 4.8 g/dL


(6.4-8.2) 


 


 





 


Albumin


 1.4 g/dL


(3.4-5.0) 


 


 





 


Albumin/Globulin Ratio 0.4 (1.0-1.7)    


 


Lipase


 162 U/L


() 


 


 





 


Glucose (Fingerstick)


 


 155 mg/dL


(70-99) 


 











Problem List


Problems


Medical Problems:


(1) Acute pancreatitis


Status: Acute  





(2) Cholelithiasis


Status: Acute  








Assessment/Plan


severe pancreatitis


resp acidosis


ARF


hypotension


leukocytosis





vent support


dialysis


CT abd if stable enough to go for exam to reassess pancreatic changes





d/w Dr Carter (ID)











KIEL BAL MD                Mar 23, 2020 08:41

## 2020-03-24 VITALS — DIASTOLIC BLOOD PRESSURE: 57 MMHG | SYSTOLIC BLOOD PRESSURE: 103 MMHG

## 2020-03-24 VITALS — SYSTOLIC BLOOD PRESSURE: 102 MMHG | DIASTOLIC BLOOD PRESSURE: 58 MMHG

## 2020-03-24 VITALS — SYSTOLIC BLOOD PRESSURE: 97 MMHG | DIASTOLIC BLOOD PRESSURE: 50 MMHG

## 2020-03-24 VITALS — SYSTOLIC BLOOD PRESSURE: 110 MMHG | DIASTOLIC BLOOD PRESSURE: 56 MMHG

## 2020-03-24 VITALS — DIASTOLIC BLOOD PRESSURE: 62 MMHG | SYSTOLIC BLOOD PRESSURE: 97 MMHG

## 2020-03-24 VITALS — DIASTOLIC BLOOD PRESSURE: 46 MMHG | SYSTOLIC BLOOD PRESSURE: 111 MMHG

## 2020-03-24 VITALS — DIASTOLIC BLOOD PRESSURE: 69 MMHG | SYSTOLIC BLOOD PRESSURE: 87 MMHG

## 2020-03-24 VITALS — SYSTOLIC BLOOD PRESSURE: 95 MMHG | DIASTOLIC BLOOD PRESSURE: 56 MMHG

## 2020-03-24 VITALS — SYSTOLIC BLOOD PRESSURE: 96 MMHG | DIASTOLIC BLOOD PRESSURE: 49 MMHG

## 2020-03-24 VITALS — DIASTOLIC BLOOD PRESSURE: 74 MMHG | SYSTOLIC BLOOD PRESSURE: 103 MMHG

## 2020-03-24 VITALS — SYSTOLIC BLOOD PRESSURE: 133 MMHG | DIASTOLIC BLOOD PRESSURE: 62 MMHG

## 2020-03-24 VITALS — SYSTOLIC BLOOD PRESSURE: 87 MMHG | DIASTOLIC BLOOD PRESSURE: 48 MMHG

## 2020-03-24 VITALS — SYSTOLIC BLOOD PRESSURE: 97 MMHG | DIASTOLIC BLOOD PRESSURE: 53 MMHG

## 2020-03-24 VITALS — SYSTOLIC BLOOD PRESSURE: 100 MMHG | DIASTOLIC BLOOD PRESSURE: 53 MMHG

## 2020-03-24 VITALS — SYSTOLIC BLOOD PRESSURE: 96 MMHG | DIASTOLIC BLOOD PRESSURE: 53 MMHG

## 2020-03-24 VITALS — SYSTOLIC BLOOD PRESSURE: 87 MMHG | DIASTOLIC BLOOD PRESSURE: 54 MMHG

## 2020-03-24 VITALS — SYSTOLIC BLOOD PRESSURE: 115 MMHG | DIASTOLIC BLOOD PRESSURE: 80 MMHG

## 2020-03-24 VITALS — SYSTOLIC BLOOD PRESSURE: 109 MMHG | DIASTOLIC BLOOD PRESSURE: 74 MMHG

## 2020-03-24 VITALS — SYSTOLIC BLOOD PRESSURE: 101 MMHG | DIASTOLIC BLOOD PRESSURE: 56 MMHG

## 2020-03-24 VITALS — SYSTOLIC BLOOD PRESSURE: 105 MMHG | DIASTOLIC BLOOD PRESSURE: 54 MMHG

## 2020-03-24 VITALS — DIASTOLIC BLOOD PRESSURE: 51 MMHG | SYSTOLIC BLOOD PRESSURE: 108 MMHG

## 2020-03-24 VITALS — DIASTOLIC BLOOD PRESSURE: 51 MMHG | SYSTOLIC BLOOD PRESSURE: 83 MMHG

## 2020-03-24 VITALS — DIASTOLIC BLOOD PRESSURE: 54 MMHG | SYSTOLIC BLOOD PRESSURE: 108 MMHG

## 2020-03-24 VITALS — DIASTOLIC BLOOD PRESSURE: 83 MMHG | SYSTOLIC BLOOD PRESSURE: 91 MMHG

## 2020-03-24 VITALS — SYSTOLIC BLOOD PRESSURE: 107 MMHG | DIASTOLIC BLOOD PRESSURE: 64 MMHG

## 2020-03-24 VITALS — SYSTOLIC BLOOD PRESSURE: 108 MMHG | DIASTOLIC BLOOD PRESSURE: 48 MMHG

## 2020-03-24 VITALS — DIASTOLIC BLOOD PRESSURE: 54 MMHG | SYSTOLIC BLOOD PRESSURE: 98 MMHG

## 2020-03-24 VITALS — SYSTOLIC BLOOD PRESSURE: 105 MMHG | DIASTOLIC BLOOD PRESSURE: 56 MMHG

## 2020-03-24 VITALS — DIASTOLIC BLOOD PRESSURE: 51 MMHG | SYSTOLIC BLOOD PRESSURE: 99 MMHG

## 2020-03-24 VITALS — DIASTOLIC BLOOD PRESSURE: 50 MMHG | SYSTOLIC BLOOD PRESSURE: 94 MMHG

## 2020-03-24 VITALS — SYSTOLIC BLOOD PRESSURE: 116 MMHG | DIASTOLIC BLOOD PRESSURE: 61 MMHG

## 2020-03-24 VITALS — DIASTOLIC BLOOD PRESSURE: 61 MMHG | SYSTOLIC BLOOD PRESSURE: 109 MMHG

## 2020-03-24 VITALS — SYSTOLIC BLOOD PRESSURE: 106 MMHG | DIASTOLIC BLOOD PRESSURE: 56 MMHG

## 2020-03-24 VITALS — DIASTOLIC BLOOD PRESSURE: 50 MMHG | SYSTOLIC BLOOD PRESSURE: 101 MMHG

## 2020-03-24 VITALS — DIASTOLIC BLOOD PRESSURE: 60 MMHG | SYSTOLIC BLOOD PRESSURE: 99 MMHG

## 2020-03-24 VITALS — DIASTOLIC BLOOD PRESSURE: 51 MMHG | SYSTOLIC BLOOD PRESSURE: 116 MMHG

## 2020-03-24 LAB
ANION GAP SERPL CALC-SCNC: 6 MMOL/L (ref 6–14)
ANION GAP SERPL CALC-SCNC: 7 MMOL/L (ref 6–14)
BASE EXCESS ABG: 1 MMOL/L (ref -3–3)
BASOPHILS # BLD AUTO: 0.1 X10^3/UL (ref 0–0.2)
BASOPHILS NFR BLD: 0 % (ref 0–3)
BUN SERPL-MCNC: 32 MG/DL (ref 7–20)
BUN SERPL-MCNC: 36 MG/DL (ref 7–20)
CALCIUM SERPL-MCNC: 7.2 MG/DL (ref 8.5–10.1)
CALCIUM SERPL-MCNC: 7.5 MG/DL (ref 8.5–10.1)
CHLORIDE SERPL-SCNC: 101 MMOL/L (ref 98–107)
CHLORIDE SERPL-SCNC: 103 MMOL/L (ref 98–107)
CO2 SERPL-SCNC: 29 MMOL/L (ref 21–32)
CO2 SERPL-SCNC: 30 MMOL/L (ref 21–32)
CREAT SERPL-MCNC: 2.1 MG/DL (ref 0.6–1)
CREAT SERPL-MCNC: 2.2 MG/DL (ref 0.6–1)
EOSINOPHIL NFR BLD: 0.4 X10^3/UL (ref 0–0.7)
EOSINOPHIL NFR BLD: 2 % (ref 0–3)
ERYTHROCYTE [DISTWIDTH] IN BLOOD BY AUTOMATED COUNT: 13.1 % (ref 11.5–14.5)
GFR SERPLBLD BASED ON 1.73 SQ M-ARVRAT: 23.7 ML/MIN
GFR SERPLBLD BASED ON 1.73 SQ M-ARVRAT: 25 ML/MIN
GLUCOSE SERPL-MCNC: 239 MG/DL (ref 70–99)
GLUCOSE SERPL-MCNC: 280 MG/DL (ref 70–99)
HCO3 BLDA-SCNC: 24 MMOL/L (ref 21–28)
HCT VFR BLD CALC: 24.4 % (ref 36–47)
HGB BLD-MCNC: 8.3 G/DL (ref 12–15.5)
INSPIRATION SETTING TIME VENT: 90
LYMPHOCYTES # BLD: 0.4 X10^3/UL (ref 1–4.8)
LYMPHOCYTES NFR BLD AUTO: 2 % (ref 24–48)
MAGNESIUM SERPL-MCNC: 2.2 MG/DL (ref 1.8–2.4)
MAGNESIUM SERPL-MCNC: 2.3 MG/DL (ref 1.8–2.4)
MCH RBC QN AUTO: 33 PG (ref 25–35)
MCHC RBC AUTO-ENTMCNC: 34 G/DL (ref 31–37)
MCV RBC AUTO: 97 FL (ref 79–100)
MONO #: 0.6 X10^3/UL (ref 0–1.1)
MONOCYTES NFR BLD: 3 % (ref 0–9)
NEUT #: 19.1 X10^3/UL (ref 1.8–7.7)
NEUTROPHILS NFR BLD AUTO: 93 % (ref 31–73)
PCO2 BLDA: 34 MMHG (ref 35–46)
PHOSPHATE SERPL-MCNC: 2.2 MG/DL (ref 2.6–4.7)
PHOSPHATE SERPL-MCNC: 2.5 MG/DL (ref 2.6–4.7)
PLATELET # BLD AUTO: 242 X10^3/UL (ref 140–400)
PO2 BLDA: 161 MMHG (ref 75–108)
POTASSIUM SERPL-SCNC: 3.3 MMOL/L (ref 3.5–5.1)
POTASSIUM SERPL-SCNC: 3.7 MMOL/L (ref 3.5–5.1)
RBC # BLD AUTO: 2.52 X10^6/UL (ref 3.5–5.4)
SAO2 % BLDA: 99 % (ref 92–99)
SODIUM SERPL-SCNC: 137 MMOL/L (ref 136–145)
SODIUM SERPL-SCNC: 139 MMOL/L (ref 136–145)
WBC # BLD AUTO: 20.5 X10^3/UL (ref 4–11)

## 2020-03-24 RX ADMIN — INSULIN LISPRO SCH UNITS: 100 INJECTION, SOLUTION INTRAVENOUS; SUBCUTANEOUS at 17:39

## 2020-03-24 RX ADMIN — METOPROLOL TARTRATE SCH MG: 5 INJECTION INTRAVENOUS at 05:44

## 2020-03-24 RX ADMIN — HEPARIN SODIUM SCH UNIT: 5000 INJECTION, SOLUTION INTRAVENOUS; SUBCUTANEOUS at 22:15

## 2020-03-24 RX ADMIN — POTASSIUM CHLORIDE SCH MLS/HR: 200 INJECTION, SOLUTION INTRAVENOUS at 12:12

## 2020-03-24 RX ADMIN — POTASSIUM CHLORIDE SCH MLS/HR: 2 INJECTION, SOLUTION, CONCENTRATE INTRAVENOUS at 00:13

## 2020-03-24 RX ADMIN — AMIODARONE HYDROCHLORIDE PRN MLS/HR: 50 INJECTION, SOLUTION INTRAVENOUS at 23:55

## 2020-03-24 RX ADMIN — INSULIN LISPRO SCH UNITS: 100 INJECTION, SOLUTION INTRAVENOUS; SUBCUTANEOUS at 00:11

## 2020-03-24 RX ADMIN — POTASSIUM CHLORIDE SCH MLS/HR: 2 INJECTION, SOLUTION, CONCENTRATE INTRAVENOUS at 20:56

## 2020-03-24 RX ADMIN — HEPARIN SODIUM SCH UNIT: 5000 INJECTION, SOLUTION INTRAVENOUS; SUBCUTANEOUS at 05:55

## 2020-03-24 RX ADMIN — MIDAZOLAM HYDROCHLORIDE PRN MLS/HR: 5 INJECTION, SOLUTION INTRAMUSCULAR; INTRAVENOUS at 14:55

## 2020-03-24 RX ADMIN — MEROPENEM SCH MLS/HR: 500 INJECTION, POWDER, FOR SOLUTION INTRAVENOUS at 17:24

## 2020-03-24 RX ADMIN — POTASSIUM CHLORIDE SCH MLS/HR: 2 INJECTION, SOLUTION, CONCENTRATE INTRAVENOUS at 00:14

## 2020-03-24 RX ADMIN — DEXTROSE SCH MLS/HR: 50 INJECTION, SOLUTION INTRAVENOUS at 09:33

## 2020-03-24 RX ADMIN — POTASSIUM CHLORIDE SCH MLS/HR: 2 INJECTION, SOLUTION, CONCENTRATE INTRAVENOUS at 15:32

## 2020-03-24 RX ADMIN — ACETAMINOPHEN PRN MG: 650 SUPPOSITORY RECTAL at 10:37

## 2020-03-24 RX ADMIN — METOPROLOL TARTRATE SCH MG: 5 INJECTION INTRAVENOUS at 12:00

## 2020-03-24 RX ADMIN — METOPROLOL TARTRATE SCH MG: 5 INJECTION INTRAVENOUS at 17:32

## 2020-03-24 RX ADMIN — POTASSIUM CHLORIDE SCH MLS/HR: 2 INJECTION, SOLUTION, CONCENTRATE INTRAVENOUS at 15:06

## 2020-03-24 RX ADMIN — POTASSIUM CHLORIDE SCH MLS/HR: 2 INJECTION, SOLUTION, CONCENTRATE INTRAVENOUS at 11:12

## 2020-03-24 RX ADMIN — MEROPENEM SCH MLS/HR: 500 INJECTION, POWDER, FOR SOLUTION INTRAVENOUS at 13:11

## 2020-03-24 RX ADMIN — PANTOPRAZOLE SODIUM SCH MG: 40 INJECTION, POWDER, FOR SOLUTION INTRAVENOUS at 08:49

## 2020-03-24 RX ADMIN — MIDAZOLAM HYDROCHLORIDE PRN MLS/HR: 5 INJECTION, SOLUTION INTRAMUSCULAR; INTRAVENOUS at 20:57

## 2020-03-24 RX ADMIN — INSULIN LISPRO SCH UNITS: 100 INJECTION, SOLUTION INTRAVENOUS; SUBCUTANEOUS at 05:56

## 2020-03-24 RX ADMIN — Medication PRN EACH: at 14:03

## 2020-03-24 RX ADMIN — MIDAZOLAM HYDROCHLORIDE PRN MLS/HR: 5 INJECTION, SOLUTION INTRAMUSCULAR; INTRAVENOUS at 10:58

## 2020-03-24 RX ADMIN — HEPARIN SODIUM SCH UNIT: 5000 INJECTION, SOLUTION INTRAVENOUS; SUBCUTANEOUS at 15:35

## 2020-03-24 RX ADMIN — INSULIN LISPRO SCH UNITS: 100 INJECTION, SOLUTION INTRAVENOUS; SUBCUTANEOUS at 12:55

## 2020-03-24 RX ADMIN — MEROPENEM SCH MLS/HR: 500 INJECTION, POWDER, FOR SOLUTION INTRAVENOUS at 23:53

## 2020-03-24 RX ADMIN — POTASSIUM CHLORIDE SCH MLS/HR: 2 INJECTION, SOLUTION, CONCENTRATE INTRAVENOUS at 11:11

## 2020-03-24 RX ADMIN — POTASSIUM CHLORIDE SCH MLS/HR: 2 INJECTION, SOLUTION, CONCENTRATE INTRAVENOUS at 20:54

## 2020-03-24 RX ADMIN — MEROPENEM SCH MLS/HR: 500 INJECTION, POWDER, FOR SOLUTION INTRAVENOUS at 08:50

## 2020-03-24 RX ADMIN — Medication PRN EACH: at 13:36

## 2020-03-24 RX ADMIN — POTASSIUM CHLORIDE SCH MLS/HR: 200 INJECTION, SOLUTION INTRAVENOUS at 11:09

## 2020-03-24 RX ADMIN — POTASSIUM CHLORIDE SCH MLS/HR: 2 INJECTION, SOLUTION, CONCENTRATE INTRAVENOUS at 03:03

## 2020-03-24 NOTE — PDOC
Objective:


Objective:


Reviewed chart, d/w nurse - notes bilious OG output since yesterday.


TPN, IV PPI, unable to do CT yet.


Vital Signs:





                                   Vital Signs








  Date Time  Temp Pulse Resp B/P (MAP) Pulse Ox O2 Delivery O2 Flow Rate FiO2


 


3/24/20 10:37    116/51 (72)    


 


3/24/20 10:20 102.7 120 25  100 Ventilator  





 102.7       


 


3/24/20 07:53       6.0 








Labs:





Laboratory Tests








Test


 3/23/20


12:32 3/23/20


17:35 3/23/20


18:26 3/23/20


21:17


 


Glucose (Fingerstick) 146 mg/dL   272 mg/dL  


 


O2 Saturation  93 %   


 


Arterial Blood pH  7.46   


 


Arterial Blood pCO2 at


Patient Temp 


 40 mmHg 


 


 





 


Arterial Blood pO2 at Patient


Temp 


 63 mmHg 


 


 





 


Arterial Blood HCO3  27 mmol/L   


 


Arterial Blood Base Excess  3 mmol/L   


 


FiO2  90 +10peep   


 


Sodium Level    139 mmol/L 


 


Potassium Level    3.4 mmol/L 


 


Chloride Level    101 mmol/L 


 


Carbon Dioxide Level    32 mmol/L 


 


Anion Gap    6 


 


Blood Urea Nitrogen    28 mg/dL 


 


Creatinine    2.6 mg/dL 


 


Estimated GFR


(Cockcroft-Gault) 


 


 


 19.6 





 


Glucose Level    198 mg/dL 


 


Calcium Level    7.5 mg/dL 


 


Phosphorus Level    2.5 mg/dL 


 


Magnesium Level    2.2 mg/dL 


 


Test


 3/24/20


00:05 3/24/20


05:15 3/24/20


05:49 3/24/20


08:05


 


Glucose (Fingerstick) 222 mg/dL   217 mg/dL  


 


White Blood Count  20.5 x10^3/uL   


 


Red Blood Count  2.52 x10^6/uL   


 


Hemoglobin  8.3 g/dL   


 


Hematocrit  24.4 %   


 


Mean Corpuscular Volume  97 fL   


 


Mean Corpuscular Hemoglobin  33 pg   


 


Mean Corpuscular Hemoglobin


Concent 


 34 g/dL 


 


 





 


Red Cell Distribution Width  13.1 %   


 


Platelet Count  242 x10^3/uL   


 


Neutrophils (%) (Auto)  93 %   


 


Lymphocytes (%) (Auto)  2 %   


 


Monocytes (%) (Auto)  3 %   


 


Eosinophils (%) (Auto)  2 %   


 


Basophils (%) (Auto)  0 %   


 


Neutrophils # (Auto)  19.1 x10^3/uL   


 


Lymphocytes # (Auto)  0.4 x10^3/uL   


 


Monocytes # (Auto)  0.6 x10^3/uL   


 


Eosinophils # (Auto)  0.4 x10^3/uL   


 


Basophils # (Auto)  0.1 x10^3/uL   


 


Sodium Level  137 mmol/L   


 


Potassium Level  3.3 mmol/L   


 


Chloride Level  101 mmol/L   


 


Carbon Dioxide Level  30 mmol/L   


 


Anion Gap  6   


 


Blood Urea Nitrogen  32 mg/dL   


 


Creatinine  2.2 mg/dL   


 


Estimated GFR


(Cockcroft-Gault) 


 23.7 


 


 





 


Glucose Level  239 mg/dL   


 


Calcium Level  7.5 mg/dL   


 


Phosphorus Level  2.2 mg/dL   


 


Magnesium Level  2.2 mg/dL   


 


Thyroid Stimulating Hormone


(TSH) 


 1.091 uIU/mL 


 


 





 


O2 Saturation    99 % 


 


Arterial Blood pH    7.47 


 


Arterial Blood pCO2 at


Patient Temp 


 


 


 34 mmHg 





 


Arterial Blood pO2 at Patient


Temp 


 


 


 161 mmHg 





 


Arterial Blood HCO3    24 mmol/L 


 


Arterial Blood Base Excess    1 mmol/L 


 


FiO2    90 








Imaging:


CXR 3/24


Impression: 


1.  Multifocal pulmonary opacities, increased within the upper lungs.


2.  Small bilateral layering pleural effusions, unchanged.





PE:





GEN: intubated on CRRT


LUNGS: vent, diminished anteriorly


HEART: tachycardic


ABD: some distention


EXTREMITY/SKIN: some mottling


NEURO/PSYCH: sedated





A/P:


Gallstone pancreatitis, fever, MOSF - intubated on CRRT and pressors





--


Will review w/ Dr. Bhatia.





Hemodynamically unstable?:  Yes


Is patient in severe pain?:  Yes


Is NPO status required?:  Yes











RAGHAVENDRA MURCIA         Mar 24, 2020 10:51

## 2020-03-24 NOTE — RAD
CHEST AP ONLY

 

History: Respiratory failure. Ventilation.

 

Comparison: March 23, 2020

 

Findings:

Low lung volumes. Small bilateral layering pleural effusions, unchanged. 

Multifocal pulmonary opacities, increased within the bilateral upper 

lungs. Unchanged heart size. Stable endotracheal tube, enteric tube, right

IJ central line, right PICC and left subclavian line. No pneumothorax.

 

Impression: 

1.  Multifocal pulmonary opacities, increased within the upper lungs.

2.  Small bilateral layering pleural effusions, unchanged.

 

Electronically signed by: Torrey Coyle DO (3/24/2020 8:59 AM) [id number]

## 2020-03-24 NOTE — PDOC
Infectious Disease Note


Subjective


Subjective


Fever Tmax 100.4


Intubated FiO2 80% PEEP 10


On Levophed gtt this am - dose varies


TPN





ROS


ROS


unable to obtain





Vital Sign


Vital Signs





Vital Signs








  Date Time  Temp Pulse Resp B/P (MAP) Pulse Ox O2 Delivery O2 Flow Rate FiO2


 


3/24/20 07:35 100.4 120 25 103/74 (84) 100 Ventilator  





 100.4       











Physical Exam


PHYSICAL EXAM


GENERAL:Intubated/sedted


HEENT: Mild icterus.  Oral mucosa very dry.


NECK:  Supple. 


LUNGS:  Decreased breath sounds at the bases.


HEART:  S1, S2, tachycardia, 120s, regular 


ABDOMEN:  Distended, + BS. Rectal tube in place


: [place]   


EXTREMITIES: Trace edema, no cyanosis - mottled.


DERMATOLOGIC:  Warm and dry.  No generalized rash.  


CENTRAL NERVOUS SYSTEM: Extremely lethargic


RUE-PICC, RIJ/Temp HDC & RUE PICC





Labs


Lab





Laboratory Tests








Test


 3/23/20


08:00 3/23/20


09:30 3/23/20


12:32 3/23/20


17:35


 


O2 Saturation 93 % (92-99)  77 % (92-99)   93 % (92-99) 


 


Arterial Blood pH


 7.22


(7.35-7.45) 7.24


(7.35-7.45) 


 7.46


(7.35-7.45)


 


Arterial Blood pCO2 at


Patient Temp 71 mmHg


(35-46) 72 mmHg


(35-46) 


 40 mmHg


(35-46)


 


Arterial Blood pO2 at Patient


Temp 75 mmHg


() 45 mmHg


() 


 63 mmHg


()


 


Arterial Blood HCO3


 28 mmol/L


(21-28) 30 mmol/L


(21-28) 


 27 mmol/L


(21-28)


 


Arterial Blood Base Excess


 -1 mmol/L


(-3-3) 1 mmol/L


(-3-3) 


 3 mmol/L


(-3-3)


 


FiO2 45 bipap    90 +10peep 


 


Glucose (Fingerstick)


 


 


 146 mg/dL


(70-99) 





 


Test


 3/23/20


18:26 3/23/20


21:17 3/24/20


00:05 3/24/20


05:15


 


Glucose (Fingerstick)


 272 mg/dL


(70-99) 


 222 mg/dL


(70-99) 





 


Sodium Level


 


 139 mmol/L


(136-145) 


 137 mmol/L


(136-145)


 


Potassium Level


 


 3.4 mmol/L


(3.5-5.1) 


 3.3 mmol/L


(3.5-5.1)


 


Chloride Level


 


 101 mmol/L


() 


 101 mmol/L


()


 


Carbon Dioxide Level


 


 32 mmol/L


(21-32) 


 30 mmol/L


(21-32)


 


Anion Gap  6 (6-14)   6 (6-14) 


 


Blood Urea Nitrogen


 


 28 mg/dL


(7-20) 


 32 mg/dL


(7-20)


 


Creatinine


 


 2.6 mg/dL


(0.6-1.0) 


 2.2 mg/dL


(0.6-1.0)


 


Estimated GFR


(Cockcroft-Gault) 


 19.6 


 


 23.7 





 


Glucose Level


 


 198 mg/dL


(70-99) 


 239 mg/dL


(70-99)


 


Calcium Level


 


 7.5 mg/dL


(8.5-10.1) 


 7.5 mg/dL


(8.5-10.1)


 


Phosphorus Level


 


 2.5 mg/dL


(2.6-4.7) 


 2.2 mg/dL


(2.6-4.7)


 


Magnesium Level


 


 2.2 mg/dL


(1.8-2.4) 


 2.2 mg/dL


(1.8-2.4)


 


White Blood Count


 


 


 


 20.5 x10^3/uL


(4.0-11.0)


 


Red Blood Count


 


 


 


 2.52 x10^6/uL


(3.50-5.40)


 


Hemoglobin


 


 


 


 8.3 g/dL


(12.0-15.5)


 


Hematocrit


 


 


 


 24.4 %


(36.0-47.0)


 


Mean Corpuscular Volume    97 fL () 


 


Mean Corpuscular Hemoglobin    33 pg (25-35) 


 


Mean Corpuscular Hemoglobin


Concent 


 


 


 34 g/dL


(31-37)


 


Red Cell Distribution Width


 


 


 


 13.1 %


(11.5-14.5)


 


Platelet Count


 


 


 


 242 x10^3/uL


(140-400)


 


Neutrophils (%) (Auto)    93 % (31-73) 


 


Lymphocytes (%) (Auto)    2 % (24-48) 


 


Monocytes (%) (Auto)    3 % (0-9) 


 


Eosinophils (%) (Auto)    2 % (0-3) 


 


Basophils (%) (Auto)    0 % (0-3) 


 


Neutrophils # (Auto)


 


 


 


 19.1 x10^3/uL


(1.8-7.7)


 


Lymphocytes # (Auto)


 


 


 


 0.4 x10^3/uL


(1.0-4.8)


 


Monocytes # (Auto)


 


 


 


 0.6 x10^3/uL


(0.0-1.1)


 


Eosinophils # (Auto)


 


 


 


 0.4 x10^3/uL


(0.0-0.7)


 


Basophils # (Auto)


 


 


 


 0.1 x10^3/uL


(0.0-0.2)


 


Test


 3/24/20


05:49 


 


 





 


Glucose (Fingerstick)


 217 mg/dL


(70-99) 


 


 











Micro


STAT CXR and KUB reviewed on 3/23








Microbiology


3/18/20 Blood Culture - Final, Complete


          NO GROWTH AFTER 5 DAYS





Objective


Assessment


Fever - ? reactive with pancreatitis vs ID


Acidosis


Hypotension - levophed started this am


Leukocytosis - better


Acute pancreatitis, early developing necrosis


Cholelithiasis


Leucocytosis - up


JED,Hyperkalemia, Metabolic acidosis on CRRT


Acute hypoxic resp failure, intubated


Hypocalcemia 


Prediabetes


HTN





Plan


Plan of Care








Check Blood cults and TSH 


CT ordered but on CRRT will need when stable


Add Flagyl and Micafungin 3/23


cont merrem adjust with CRRT (3/24) d/w pharmacy and charge nurse,and Zyvox 

(3/20) 


f/u labs and cults


No surgical plans at this time





Electrolytes per primary





Critically ill





D/w nursing











RASHAWN ROSEN MD              Mar 24, 2020 08:00

## 2020-03-24 NOTE — NUR
CRRT filter clotting.  200 cc NS flush attempted but without success.  Blood returned and 
CRRT disconnected.  Dialysis lines flushed and capped.  Will monitor.

## 2020-03-24 NOTE — PDOC
SUBJECTIVE


ROS


Intubated this am, On pressors





OBJECTIVE


Vital Signs





Vital Signs








  Date Time  Temp Pulse Resp B/P (MAP) Pulse Ox O2 Delivery O2 Flow Rate FiO2


 


3/24/20 09:49     100 Ventilator  


 


3/24/20 08:59  129 25 109/74 (86)    


 


3/24/20 07:53       6.0 


 


3/24/20 07:35 100.4       





 100.4       








I & 0











Intake and Output 


 


 3/24/20





 07:00


 


Intake Total 2914 ml


 


Output Total 495 ml


 


Balance 2419 ml


 


 


 


IV Total 2914 ml


 


Output Urine Total 95 ml


 


Stool Total 200 ml


 


Gastric Drainage Total 200 ml











PHYSICAL EXAM


Physical Exam


GENERAL: Intubated on MV 


HEENT: Mild icterus, Intubated  


NECK:  Supple. 


LUNGS:  Decreased breath sounds at the bases.


HEART:  S1, S2, tachycardia, 110s,


ABDOMEN:  Distended, + BS. Rectal tube in place


: [place]  +


EXTREMITIES: Trace edema, no cyanosis - mottled.


DERMATOLOGIC:  Warm and dry.  No generalized rash.  


CENTRAL NERVOUS SYSTEM: Intubated  on MV 


RUE-PICC, RIJ/Temp HDC & LIJ





DIAGNOSIS/ASSESSMENT


Assessment & Plan


JED - ATN, Anuric, requiring HD 


 intubated on 3/23 , currently on  pressors


Switched to CRRT 3/23 after HD in am , seen on CRRT, tolerating well


Discussed with RN and Robert  





HyperKalemia-  in the past, Currently Mild hypokalemia - replace as needed 





 Acidosis -  Bicarb Normal- on higher side ,  Dced  IV bicarb  on 3/23


Currently on  TPN with bicarb  





HypoCalcemia- Corrected Ca normal  





HypOPhos -Replace Kphos  IV , liban CALL 





 Acute pancreatitis - with necrosis/infection, likely gallstone pancreatitis. 

GI, ID, and general surgery  following  





Calculus of gallbladder with acute cholecystitis without obstruction - with 

secondary pancreatitis. Lap naif is indicated, will need to await pancreatitis 

resolution





HTN - Hypotensive currently and on pressors  





Sepsis - with acute pancreatitis as end organ dysfunction, sign of infection, 

zyvox, merrem





Hypoxia with respiratory failure - intubated





COMMENT/RELEVANT DATA


Meds





Current Medications








 Medications


  (Trade)  Dose


 Ordered  Sig/Yee  Start Time


 Stop Time Status Last Admin


Dose Admin


 


 Acetaminophen


  (Tylenol)  650 mg  PRN Q6HRS  PRN  3/21/20 03:36


    3/21/20 16:36


650 MG


 


 Albumin Human  200 ml @ 


 200 mls/hr  1X  ONCE  3/23/20 07:30


 3/23/20 08:29 DC 3/23/20 08:51


200 MLS/HR


 


 Albuterol Sulfate


  (Ventolin Neb


 Soln)  2.5 mg  1X  ONCE  3/17/20 22:30


 3/17/20 22:31 DC 3/18/20 00:56


2.5 MG


 


 Artificial Tears


  (Artificial


 Tears)  1 drop  PRN Q1HR  PRN  3/23/20 08:15


     





 


 Atenolol


  (Tenormin)  100 mg  DAILY  3/17/20 09:00


 3/16/20 20:08 DC  





 


 Benzocaine


  (Hurricaine One)  1 spray  1X  ONCE  3/20/20 14:30


 3/20/20 14:31 DC 3/20/20 16:38


1 SPRAY


 


 Calcium Carbonate/


 Glycine


  (Tums)  500 mg  PRN [id number]  PRN  3/18/20 17:45


     





 


 Calcium Chloride


 1000 mg/Sodium


 Chloride  110 ml @ 


 220 mls/hr  1X  ONCE  3/17/20 22:30


 3/17/20 22:59 DC 3/17/20 22:11


220 MLS/HR


 


 Calcium Chloride


 3000 mg/Sodium


 Chloride  1,030 ml @ 


 50 mls/hr  [id number]  3/19/20 08:00


 3/21/20 15:23 DC 3/21/20 02:17


50 MLS/HR


 


 Calcium Gluconate


  (Calcium


 Gluconate)  2,000 mg  1X  ONCE  3/19/20 02:15


 3/19/20 02:16 DC 3/19/20 02:19


2,000 MG


 


 Calcium Gluconate


 1000 mg/Sodium


 Chloride  110 ml @ 


 220 mls/hr  1X  ONCE  3/18/20 03:30


 3/18/20 03:59 DC 3/18/20 03:21


220 MLS/HR


 


 Calcium Gluconate


 2000 mg/Sodium


 Chloride  120 ml @ 


 220 mls/hr  1X  ONCE  3/18/20 07:30


 3/18/20 08:02 DC 3/18/20 09:05


220 MLS/HR


 


 Dextrose


  (Dextrose


 50%-Water Syringe)  12.5 gm  PRN Q15MIN  PRN  3/16/20 09:30


     





 


 Digoxin


  (Lanoxin)  125 mcg  1X  ONCE  3/19/20 18:00


 3/19/20 18:01 DC 3/19/20 17:10


125 MCG


 


 Diphenhydramine


 HCl


  (Benadryl)  25 mg  1X PRN  PRN  3/23/20 07:30


 3/24/20 07:29 DC  





 


 Etomidate


  (Amidate)  8 mg  1X  ONCE  3/23/20 08:30


 3/23/20 08:31 DC 3/23/20 08:33


8 MG


 


 Fentanyl Citrate  30 ml @ 0


 mls/hr  CONT  PRN  3/23/20 08:15


    3/24/20 07:23


2.5 MLS/HR


 


 Fentanyl Citrate


  (Fentanyl 2ml


 Vial)  100 mcg  STK-MED ONCE  3/16/20 03:18


 3/16/20 03:18 DC  





 


 Furosemide


  (Lasix)  40 mg  1X  ONCE  3/17/20 22:30


 3/17/20 22:31 DC 3/17/20 22:12


40 MG


 


 Heparin Sodium


  (Porcine)


  (Heparin Sodium)  5,000 unit  Q8HRS  3/23/20 15:00


    3/24/20 05:55


5,000 UNIT


 


 Hydromorphone HCl


  (Dilaudid)  1 mg  PRN Q3HRS  PRN  3/17/20 12:00


    3/23/20 05:13


1 MG


 


 Info


  (CONTRAST GIVEN


 -- Rx MONITORING)  1 each  PRN DAILY  PRN  3/17/20 17:00


 3/19/20 16:59 DC  





 


 Info


  (PHARMACY


 MONITORING -- do


 not chart)  1 each  PRN DAILY  PRN  3/23/20 07:30


     





 


 Info


  (Tpn Per


 Pharmacy)  1 each  PRN DAILY  PRN  3/18/20 12:30


   UNV  





 


 Insulin Human


 Lispro


  (HumaLOG)  0-9 UNITS  Q6HRS  3/16/20 09:30


    3/24/20 05:56


5 UNITS


 


 Insulin Human


 Regular


  (HumuLIN R VIAL)  5 unit  1X  ONCE  3/17/20 22:30


 3/17/20 22:31 DC 3/17/20 22:14


5 UNIT


 


 Iohexol


  (Omnipaque 300


 Mg/ml)  60 ml  1X  ONCE  3/17/20 17:00


 3/17/20 17:01 DC 3/17/20 17:20


60 ML


 


 Iohexol


  (Omnipaque 350


 Mg/ml)  90 ml  1X  ONCE  3/16/20 03:30


 3/16/20 03:31 DC 3/16/20 03:25


90 ML


 


 Ketorolac


 Tromethamine


  (Toradol 30mg


 Vial)  30 mg  1X  ONCE  3/16/20 03:00


 3/16/20 03:01 DC 3/16/20 02:54


30 MG


 


 Lidocaine HCl


  (Buffered


 Lidocaine 1%)  6 ml  1X  ONCE  3/18/20 10:15


 3/18/20 10:16 DC 3/18/20 10:26


4 ML


 


 Lidocaine HCl


  (Glydo


  (Lidocaine) Jelly)  1 thomas  1X  ONCE  3/20/20 14:30


 3/20/20 14:31 DC 3/20/20 16:38


1 THOMAS


 


 Lidocaine HCl


  (Xylocaine-Mpf


 1% 2ml Vial)  2 ml  STK-MED ONCE  3/18/20 08:47


 3/18/20 08:47 DC  





 


 Linezolid/Dextrose  300 ml @ 


 300 mls/hr  Q12HR  3/20/20 20:00


    3/23/20 21:01


300 MLS/HR


 


 Lorazepam


  (Ativan Inj)  1 mg  PRN Q4HRS  PRN  3/19/20 09:00


    3/23/20 00:34


1 MG


 


 Magnesium Sulfate  100 ml @ 


 25 mls/hr  1X  ONCE  3/19/20 13:00


 3/19/20 16:59 DC 3/19/20 12:48


25 MLS/HR


 


 Meropenem 1 gm/


 Sodium Chloride  100 ml @ 


 200 mls/hr  Q8HRS  3/17/20 20:00


 3/18/20 08:48 DC 3/18/20 05:45


200 MLS/HR


 


 Meropenem 500 mg/


 Sodium Chloride  50 ml @ 


 100 mls/hr  Q6HRS  3/24/20 09:00


    3/24/20 08:50


100 MLS/HR


 


 Metoprolol


 Tartrate


  (Lopressor Vial)  5 mg  Q6HRS  3/17/20 10:15


    3/23/20 18:28


5 MG


 


 Metronidazole  100 ml @ 


 100 mls/hr  Q6HRS  3/23/20 08:30


    3/24/20 05:44


100 MLS/HR


 


 Micafungin Sodium


 100 mg/Dextrose  100 ml @ 


 100 mls/hr  Q24H  3/23/20 09:00


    3/24/20 09:33


100 MLS/HR


 


 Midazolam HCl


  (Versed)  5 mg  1X  ONCE  3/23/20 08:30


 3/23/20 08:31 DC  





 


 Midazolam HCl 50


 mg/Sodium Chloride  50 ml @ 0


 mls/hr  CONT  PRN  3/23/20 08:15


    3/23/20 23:48


7 MLS/HR


 


 Morphine Sulfate


  (Morphine


 Sulfate)  2 mg  PRN Q2HR  PRN  3/16/20 05:00


 3/17/20 14:15 DC 3/17/20 12:26


2 MG


 


 Norepinephrine


 Bitartrate 8 mg/


 Dextrose  258 ml @ 


 17.299 mls/


 hr  CONT  PRN  3/17/20 15:30


    3/23/20 06:51


17.299 MLS/HR


 


 Ondansetron HCl


  (Zofran)  4 mg  PRN Q4HRS  PRN  3/16/20 09:30


    3/21/20 16:15


4 MG


 


 Pantoprazole


 Sodium


  (PROTONIX VIAL


 for IV PUSH)  40 mg  DAILYAC  3/16/20 11:30


    3/24/20 08:49


40 MG


 


 Piperacillin Sod/


 Tazobactam Sod


 4.5 gm/Sodium


 Chloride  100 ml @ 


 200 mls/hr  1X  ONCE  3/16/20 06:00


 3/16/20 06:29 DC 3/16/20 05:44


200 MLS/HR


 


 Potassium


 Chloride 15 meq/


 Bicarbonate


 Dialysis Soln w/


 out KCl  5,007.5 ml


  @ 1,000 mls/


 hr  Q5H1M  3/23/20 12:00


    3/24/20 00:13


1,000 MLS/HR


 


 Potassium


 Chloride/Water  100 ml @ 


 100 mls/hr  1X  ONCE  3/20/20 14:00


 3/20/20 14:59 DC 3/20/20 14:09


100 MLS/HR


 


 Prochlorperazine


 Edisylate


  (Compazine)  10 mg  PRN Q6HRS  PRN  3/16/20 17:45


    3/17/20 00:42


10 MG


 


 Propofol  0 ml @ As


 Directed  STK-MED ONCE  3/23/20 07:53


 3/23/20 07:53 DC  





 


 Sodium


 Bicarbonate 50


 meq/Sodium


 Chloride  1,050 ml @ 


 75 mls/hr  Q14H  3/18/20 07:30


 3/23/20 10:28 DC 3/22/20 21:10


75 MLS/HR


 


 Sodium Chloride


  (Normal Saline


 Flush)  10 ml  1X PRN  PRN  3/21/20 08:00


 3/22/20 07:59 DC  





 


 Sodium Chloride


 90 meq/Calcium


 Gluconate 10 meq/


 Multivitamins 10


 ml/Chromium/


 Copper/Manganese/


 Seleni/Zn 0.5 ml/


 Total Parenteral


 Nutrition/Amino


 Acids/Dextrose/


 Fat Emulsion


 Intravenous  1,512 ml @ 


 63 mls/hr  TPN  CONT  3/18/20 22:00


 3/19/20 21:59 DC 3/18/20 22:06


63 MLS/HR


 


 Sodium Chloride


 90 meq/Calcium


 Gluconate 10 meq/


 Multivitamins 10


 ml/Chromium/


 Copper/Manganese/


 Seleni/Zn 1 ml/


 Total Parenteral


 Nutrition/Amino


 Acids/Dextrose/


 Fat Emulsion


 Intravenous  55.005 ml


  @ 2.292


 mls/hr  TPN  CONT  3/18/20 22:00


 3/18/20 12:33 DC  





 


 Sodium Chloride


 90 meq/Magnesium


 Sulfate 10 meq/


 Calcium Gluconate


 20 meq/


 Multivitamins 10


 ml/Chromium/


 Copper/Manganese/


 Seleni/Zn 0.5 ml/


 Total Parenteral


 Nutrition/Amino


 Acids/Dextrose/


 Fat Emulsion


 Intravenous  1,512 ml @ 


 63 mls/hr  TPN  CONT  3/19/20 22:00


 3/20/20 21:59 DC 3/19/20 22:25


63 MLS/HR


 


 Sodium Chloride


 90 meq/Potassium


 Chloride 15 meq/


 Potassium


 Phosphate 10 mmol/


 Magnesium Sulfate


 10 meq/Calcium


 Gluconate 20 meq/


 Multivitamins 10


 ml/Chromium/


 Copper/Manganese/


 Seleni/Zn 0.5 ml/


 Total Parenteral


 Nutrition/Amino


 Acids/Dextrose/


 Fat Emulsion


 Intravenous  1,400 ml @ 


 58.333 mls/


 hr  TPN  CONT  3/23/20 22:00


 3/24/20 21:59  3/23/20 21:42


58.333 MLS/HR


 


 Sodium Chloride


 90 meq/Potassium


 Chloride 15 meq/


 Potassium


 Phosphate 15 mmol/


 Magnesium Sulfate


 10 meq/Calcium


 Gluconate 20 meq/


 Multivitamins 10


 ml/Chromium/


 Copper/Manganese/


 Seleni/Zn 0.5 ml/


 Total Parenteral


 Nutrition/Amino


 Acids/Dextrose/


 Fat Emulsion


 Intravenous  1,200 ml @ 


 50 mls/hr  TPN  CONT  3/22/20 22:00


 3/22/20 14:17 DC  





 


 Succinylcholine


 Chloride


  (Anectine)  120 mg  1X  ONCE  3/23/20 08:30


 3/23/20 08:31 DC 3/23/20 08:34


120 MG








Lab





Laboratory Tests








Test


 3/23/20


12:32 3/23/20


17:35 3/23/20


18:26 3/23/20


21:17


 


Glucose (Fingerstick)


 146 mg/dL


(70-99) 


 272 mg/dL


(70-99) 





 


O2 Saturation  93 % (92-99)   


 


Arterial Blood pH


 


 7.46


(7.35-7.45) 


 





 


Arterial Blood pCO2 at


Patient Temp 


 40 mmHg


(35-46) 


 





 


Arterial Blood pO2 at Patient


Temp 


 63 mmHg


() 


 





 


Arterial Blood HCO3


 


 27 mmol/L


(21-28) 


 





 


Arterial Blood Base Excess


 


 3 mmol/L


(-3-3) 


 





 


FiO2  90 +10peep   


 


Sodium Level


 


 


 


 139 mmol/L


(136-145)


 


Potassium Level


 


 


 


 3.4 mmol/L


(3.5-5.1)


 


Chloride Level


 


 


 


 101 mmol/L


()


 


Carbon Dioxide Level


 


 


 


 32 mmol/L


(21-32)


 


Anion Gap    6 (6-14) 


 


Blood Urea Nitrogen


 


 


 


 28 mg/dL


(7-20)


 


Creatinine


 


 


 


 2.6 mg/dL


(0.6-1.0)


 


Estimated GFR


(Cockcroft-Gault) 


 


 


 19.6 





 


Glucose Level


 


 


 


 198 mg/dL


(70-99)


 


Calcium Level


 


 


 


 7.5 mg/dL


(8.5-10.1)


 


Phosphorus Level


 


 


 


 2.5 mg/dL


(2.6-4.7)


 


Magnesium Level


 


 


 


 2.2 mg/dL


(1.8-2.4)


 


Test


 3/24/20


00:05 3/24/20


05:15 3/24/20


05:49 3/24/20


08:05


 


Glucose (Fingerstick)


 222 mg/dL


(70-99) 


 217 mg/dL


(70-99) 





 


White Blood Count


 


 20.5 x10^3/uL


(4.0-11.0) 


 





 


Red Blood Count


 


 2.52 x10^6/uL


(3.50-5.40) 


 





 


Hemoglobin


 


 8.3 g/dL


(12.0-15.5) 


 





 


Hematocrit


 


 24.4 %


(36.0-47.0) 


 





 


Mean Corpuscular Volume  97 fL ()   


 


Mean Corpuscular Hemoglobin  33 pg (25-35)   


 


Mean Corpuscular Hemoglobin


Concent 


 34 g/dL


(31-37) 


 





 


Red Cell Distribution Width


 


 13.1 %


(11.5-14.5) 


 





 


Platelet Count


 


 242 x10^3/uL


(140-400) 


 





 


Neutrophils (%) (Auto)  93 % (31-73)   


 


Lymphocytes (%) (Auto)  2 % (24-48)   


 


Monocytes (%) (Auto)  3 % (0-9)   


 


Eosinophils (%) (Auto)  2 % (0-3)   


 


Basophils (%) (Auto)  0 % (0-3)   


 


Neutrophils # (Auto)


 


 19.1 x10^3/uL


(1.8-7.7) 


 





 


Lymphocytes # (Auto)


 


 0.4 x10^3/uL


(1.0-4.8) 


 





 


Monocytes # (Auto)


 


 0.6 x10^3/uL


(0.0-1.1) 


 





 


Eosinophils # (Auto)


 


 0.4 x10^3/uL


(0.0-0.7) 


 





 


Basophils # (Auto)


 


 0.1 x10^3/uL


(0.0-0.2) 


 





 


Sodium Level


 


 137 mmol/L


(136-145) 


 





 


Potassium Level


 


 3.3 mmol/L


(3.5-5.1) 


 





 


Chloride Level


 


 101 mmol/L


() 


 





 


Carbon Dioxide Level


 


 30 mmol/L


(21-32) 


 





 


Anion Gap  6 (6-14)   


 


Blood Urea Nitrogen


 


 32 mg/dL


(7-20) 


 





 


Creatinine


 


 2.2 mg/dL


(0.6-1.0) 


 





 


Estimated GFR


(Cockcroft-Gault) 


 23.7 


 


 





 


Glucose Level


 


 239 mg/dL


(70-99) 


 





 


Calcium Level


 


 7.5 mg/dL


(8.5-10.1) 


 





 


Phosphorus Level


 


 2.2 mg/dL


(2.6-4.7) 


 





 


Magnesium Level


 


 2.2 mg/dL


(1.8-2.4) 


 





 


Thyroid Stimulating Hormone


(TSH) 


 1.091 uIU/mL


(0.358-3.74) 


 





 


O2 Saturation    99 % (92-99) 


 


Arterial Blood pH


 


 


 


 7.47


(7.35-7.45)


 


Arterial Blood pCO2 at


Patient Temp 


 


 


 34 mmHg


(35-46)


 


Arterial Blood pO2 at Patient


Temp 


 


 


 161 mmHg


()


 


Arterial Blood HCO3


 


 


 


 24 mmol/L


(21-28)


 


Arterial Blood Base Excess


 


 


 


 1 mmol/L


(-3-3)


 


FiO2    90 








Results


All relevant outside records, renal labs, imaging studies, telemetry/EKG's were 

reviewed.


Other


CxR-- 





1.  Multifocal pulmonary opacities, increased within the upper lungs.


2.  Small bilateral layering pleural effusions, unchanged.











KIMBERLY MYERS MD                Mar 24, 2020 10:05

## 2020-03-24 NOTE — PDOC
[facility] PROGRESS NOTE


Chief Complaint


Chief Complaint


Respiratory failure requiring intubation this morning


Severe Acute pancreatitis


Acute kidney failure now requiring dialysis


Salpingo--itis


Gallstones (Calculus of gallbladder with acute cholecystitis without 

obstruction)


HTN 


Leukocytosis 


Hypoxia


Uterine fibroid


Hypoxia with respiratory failure


Intractable pain


Intractable nausea





History of Present Illness


History of Present Illness


[id number]


Patient seen and examined in the ICU


She remains extremely critically ill


On IV fentanyl IV Versed IV Doxy


On the vent


Assist-control/25/450/70%


She is critically ill


Discussed with RN


Chart reviewed


Patient is currently on CRRT as well








[id number]


Patient seen and examined in the ICU


She had to be intubated this morning


On assist-control 25/450/100% with 10 of PEEP and only satting 87%


She is extremely critically ill


I'm not sure if she will survive


Chart reviewed


Discussed with RN











Ms Tadeo is a 48yo F w/ PMHx HTN, prediabetes who presents the emergency room

complaints of abdominal pain. Patient described off and on 3 days. She states is

constant, described as a squeezing sensation in a band-like distribution. + 

nausea, vomiting.  She denies any fever or diarrhea.  Patient denies any 

abdominal surgical procedures.  She states is worse with movements, car ride.  

Pain initially was upper abdomen however now pretty much generalized.  Last 

bowel movement was 3/15/2020. Nothing makes her pain better.  Patient denies any

shortness of breath.  She does state the pain moves into her chest.  Denies any 

headache or visual changes.


Lipase 06751, , , Bilirubin 1.4.


CT abdomen confirms pancreatic inflammation, peripancreatic fluid and 

inflammatory changes around the pancreas consistent with pancreatitis. 

Cholelithiasis and 1.4cm uterine fibroid as well as possible left salpingitis. 

Admitted for further care


GI, General surgery, ID, Pulm consulted.





3/17: Overnight per report no urine output. Added dilaudid for pain, PICC placed

per IR. Renal US negative.Seen bedside in ICU, given 2L additional NSS and 

albumin infusion. Still hypotensive, started on levophed. Repeat CT abdomen with

necrosis. Updated her fiancee


3/18: Sats are only 87% on nasal cannula oxygen. Dialysis catheter per 

nephrology


3/19: She is now on BiPAP appears more ill, now on dialysis


3/20: Seen on BiPAP. Her mother and another family member are present and seemed

to be good support for her. Currently on dialysis. Appears critically ill


3/21: Overnight Tmax 101.7 , still on BiPAP FiO2 40%, still on low dose Levophed

gtt, TPN initiated. On dialysis





Overnight still febrile. Dialysis today. She wakes up and responds to pain. No 

CP.





Vitals/I&O


Vitals/I&O:





                                   Vital Signs








  Date Time  Temp Pulse Resp B/P (MAP) Pulse Ox O2 Delivery O2 Flow Rate FiO2


 


3/24/20 12:07      Mechanical Ventilator  


 


3/24/20 12:05  101 25 97/50 (66) 100   


 


3/24/20 11:33 98.2       





 98.2       


 


3/24/20 07:53       6.0 














                                    I & O   


 


 3/23/20 3/23/20 3/24/20





 15:00 23:00 07:00


 


Intake Total 50 ml 1698 ml 1166 ml


 


Output Total 35 ml 35 ml 425 ml


 


Balance 15 ml 1663 ml 741 ml











Physical Exam


Physical Exam:


GENERAL:Intubated/sedted


HEENT: Mild icterus.  Oral mucosa very dry.


NECK:  Supple. 


LUNGS:  Decreased breath sounds at the bases.


HEART:  S1, S2, tachycardia, 120s, regular 


ABDOMEN:  Distended, + BS. Rectal tube in place


: [place]   


EXTREMITIES: Trace edema, no cyanosis - mottled.


DERMATOLOGIC:  Warm and dry.  No generalized rash.  


CENTRAL NERVOUS SYSTEM: Extremely lethargic


RUE-PICC, RIJ/Temp HDC & RUE PICC


General:  No acute distress


Heart:  Other (increased rate)


Lungs:  Crackles, Other (dull at bases)


Abdomen:  Soft, Other (ND)


Extremities:  No edema, Other (SOME CLUBBING )


Skin:  Other (mottling noted to extremities )





Labs


Labs:





Laboratory Tests








Test


 3/23/20


17:35 3/23/20


18:26 3/23/20


21:17 3/24/20


00:05


 


O2 Saturation 93 % (92-99)    


 


Arterial Blood pH


 7.46


(7.35-7.45) 


 


 





 


Arterial Blood pCO2 at


Patient Temp 40 mmHg


(35-46) 


 


 





 


Arterial Blood pO2 at Patient


Temp 63 mmHg


() 


 


 





 


Arterial Blood HCO3


 27 mmol/L


(21-28) 


 


 





 


Arterial Blood Base Excess


 3 mmol/L


(-3-3) 


 


 





 


FiO2 90 +10peep    


 


Glucose (Fingerstick)


 


 272 mg/dL


(70-99) 


 222 mg/dL


(70-99)


 


Sodium Level


 


 


 139 mmol/L


(136-145) 





 


Potassium Level


 


 


 3.4 mmol/L


(3.5-5.1) 





 


Chloride Level


 


 


 101 mmol/L


() 





 


Carbon Dioxide Level


 


 


 32 mmol/L


(21-32) 





 


Anion Gap   6 (6-14)  


 


Blood Urea Nitrogen


 


 


 28 mg/dL


(7-20) 





 


Creatinine


 


 


 2.6 mg/dL


(0.6-1.0) 





 


Estimated GFR


(Cockcroft-Gault) 


 


 19.6 


 





 


Glucose Level


 


 


 198 mg/dL


(70-99) 





 


Calcium Level


 


 


 7.5 mg/dL


(8.5-10.1) 





 


Phosphorus Level


 


 


 2.5 mg/dL


(2.6-4.7) 





 


Magnesium Level


 


 


 2.2 mg/dL


(1.8-2.4) 





 


Test


 3/24/20


05:15 3/24/20


05:49 3/24/20


08:05 





 


White Blood Count


 20.5 x10^3/uL


(4.0-11.0) 


 


 





 


Red Blood Count


 2.52 x10^6/uL


(3.50-5.40) 


 


 





 


Hemoglobin


 8.3 g/dL


(12.0-15.5) 


 


 





 


Hematocrit


 24.4 %


(36.0-47.0) 


 


 





 


Mean Corpuscular Volume 97 fL ()    


 


Mean Corpuscular Hemoglobin 33 pg (25-35)    


 


Mean Corpuscular Hemoglobin


Concent 34 g/dL


(31-37) 


 


 





 


Red Cell Distribution Width


 13.1 %


(11.5-14.5) 


 


 





 


Platelet Count


 242 x10^3/uL


(140-400) 


 


 





 


Neutrophils (%) (Auto) 93 % (31-73)    


 


Lymphocytes (%) (Auto) 2 % (24-48)    


 


Monocytes (%) (Auto) 3 % (0-9)    


 


Eosinophils (%) (Auto) 2 % (0-3)    


 


Basophils (%) (Auto) 0 % (0-3)    


 


Neutrophils # (Auto)


 19.1 x10^3/uL


(1.8-7.7) 


 


 





 


Lymphocytes # (Auto)


 0.4 x10^3/uL


(1.0-4.8) 


 


 





 


Monocytes # (Auto)


 0.6 x10^3/uL


(0.0-1.1) 


 


 





 


Eosinophils # (Auto)


 0.4 x10^3/uL


(0.0-0.7) 


 


 





 


Basophils # (Auto)


 0.1 x10^3/uL


(0.0-0.2) 


 


 





 


Sodium Level


 137 mmol/L


(136-145) 


 


 





 


Potassium Level


 3.3 mmol/L


(3.5-5.1) 


 


 





 


Chloride Level


 101 mmol/L


() 


 


 





 


Carbon Dioxide Level


 30 mmol/L


(21-32) 


 


 





 


Anion Gap 6 (6-14)    


 


Blood Urea Nitrogen


 32 mg/dL


(7-20) 


 


 





 


Creatinine


 2.2 mg/dL


(0.6-1.0) 


 


 





 


Estimated GFR


(Cockcroft-Gault) 23.7 


 


 


 





 


Glucose Level


 239 mg/dL


(70-99) 


 


 





 


Calcium Level


 7.5 mg/dL


(8.5-10.1) 


 


 





 


Phosphorus Level


 2.2 mg/dL


(2.6-4.7) 


 


 





 


Magnesium Level


 2.2 mg/dL


(1.8-2.4) 


 


 





 


Thyroid Stimulating Hormone


(TSH) 1.091 uIU/mL


(0.358-3.74) 


 


 





 


Glucose (Fingerstick)


 


 217 mg/dL


(70-99) 


 





 


O2 Saturation   99 % (92-99)  


 


Arterial Blood pH


 


 


 7.47


(7.35-7.45) 





 


Arterial Blood pCO2 at


Patient Temp 


 


 34 mmHg


(35-46) 





 


Arterial Blood pO2 at Patient


Temp 


 


 161 mmHg


() 





 


Arterial Blood HCO3


 


 


 24 mmol/L


(21-28) 





 


Arterial Blood Base Excess


 


 


 1 mmol/L


(-3-3) 





 


FiO2   90  











Assessment and Plan


Assessmemt and Plan


Problems


Medical Problems:


(1) Acute pancreatitis


Status: Acute  





(2) Cholelithiasis


Status: Acute  





Respiratory failure requiring intubation this morning 


Severe Acute pancreatitis


Acute kidney failure now requiring dialysis


Salpingo--itis


Gallstones (Calculus of gallbladder with acute cholecystitis without 

obstruction)


HTN 


Leukocytosis 


Hypoxia


Uterine fibroid


Hypoxia with respiratory failure


Intractable pain


Intractable nausea











Plan


CRRT


Mechanical ventilation


Hemodialysis


BiPAP


ICU monitoring


Trend labs especially white count and lipase levels


We have consulted GI and general surgery


IV antibiotics


IV fluids


OH narcotics


When necessary anti-medics


Home meds if possible


DVT prophylaxis


Full code


She is critically ill


Total time 31 minutes





Comment


Review of Relevant


I have reviewed the following items josy (where applicable) has been applied.


Medications:





Current Medications








 Medications


  (Trade)  Dose


 Ordered  Sig/Yee


 Route


 PRN Reason  Start Time


 Stop Time Status Last Admin


Dose Admin


 


 Sodium Chloride


 90 meq/Potassium


 Chloride 15 meq/


 Potassium


 Phosphate 10 mmol/


 Magnesium Sulfate


 10 meq/Calcium


 Gluconate 20 meq/


 Multivitamins 10


 ml/Chromium/


 Copper/Manganese/


 Seleni/Zn 0.5 ml/


 Total Parenteral


 Nutrition/Amino


 Acids/Dextrose/


 Fat Emulsion


 Intravenous  1,400 ml @ 


 58.333 mls/


 hr  TPN  CONT


 IV


   3/23/20 22:00


 3/24/20 21:59  3/23/20 21:42





 


 Heparin Sodium


  (Porcine)


  (Heparin Sodium)  5,000 unit  Q8HRS


 SQ


   3/23/20 15:00


    3/24/20 05:55





 


 Meropenem 500 mg/


 Sodium Chloride  50 ml @ 


 100 mls/hr  Q6HRS


 IV


   3/24/20 09:00


    3/24/20 08:50





 


 Potassium


 Phosphate 20 mmol/


 Sodium Chloride  106.6667


 ml @ 


 51.667 m...  1X  ONCE


 IV


   3/24/20 10:15


 3/24/20 12:18 DC 3/24/20 11:22





 


 Acetaminophen


  (Tylenol Supp)  650 mg  PRN Q6HRS  PRN


 OH


 MILD PAIN / TEMP  3/24/20 10:30


    3/24/20 10:37





 


 Potassium


 Chloride/Water  100 ml @ 


 100 mls/hr  Q1H


 IV


   3/24/20 11:00


 3/24/20 12:59  3/24/20 12:12














Hemodynamically unstable?:  Yes


Is patient in severe pain?:  Yes


Is NPO status required?:  Yes











HECTOR MASON III, DO           Mar 24, 2020 12:40

## 2020-03-24 NOTE — PDOC
PULMONARY PROGRESS NOTES


Subjective


Patient intubated on 323 T-max 100.4


she is on 80% FiO2 10 of PEEP


Vitals





Vital Signs








  Date Time  Temp Pulse Resp B/P (MAP) Pulse Ox O2 Delivery O2 Flow Rate FiO2


 


3/24/20 08:04      Mechanical Ventilator  


 


3/24/20 08:02  126 25 115/80 (92) 100   


 


3/24/20 07:35 100.4       





 100.4       








HEENT:  Other (nc at perrl   bipap mask on   neck no lad   no thyromegaly)


Lungs:  Crackles, Other (dull at bases)


Cardiovascular:  S1, S2


Abdomen:  Other (distended,  diffuse pain   no mass)


Extremities:  No Edema


Skin:  Warm


Labs





Laboratory Tests








Test


 3/22/20


11:50 3/22/20


18:18 3/23/20


00:38 3/23/20


05:32


 


Glucose (Fingerstick)


 216 mg/dL


(70-99) 223 mg/dL


(70-99) 260 mg/dL


(70-99) 





 


O2 Saturation    99 % (92-99) 


 


Arterial Blood pH


 


 


 


 7.20


(7.35-7.45)


 


Arterial Blood pCO2 at


Patient Temp 


 


 


 78 mmHg


(35-46)


 


Arterial Blood pO2 at Patient


Temp 


 


 


 324 mmHg


()


 


Arterial Blood HCO3


 


 


 


 30 mmol/L


(21-28)


 


Arterial Blood Base Excess


 


 


 


 1 mmol/L


(-3-3)


 


FiO2    100 


 


Test


 3/23/20


05:50 3/23/20


05:56 3/23/20


08:00 3/23/20


09:30


 


White Blood Count


 22.6 x10^3/uL


(4.0-11.0) 


 


 





 


Red Blood Count


 2.86 x10^6/uL


(3.50-5.40) 


 


 





 


Hemoglobin


 9.5 g/dL


(12.0-15.5) 


 


 





 


Hematocrit


 28.6 %


(36.0-47.0) 


 


 





 


Mean Corpuscular Volume


 100 fL


() 


 


 





 


Mean Corpuscular Hemoglobin 33 pg (25-35)    


 


Mean Corpuscular Hemoglobin


Concent 33 g/dL


(31-37) 


 


 





 


Red Cell Distribution Width


 14.0 %


(11.5-14.5) 


 


 





 


Platelet Count


 291 x10^3/uL


(140-400) 


 


 





 


Neutrophils (%) (Auto) 91 % (31-73)    


 


Lymphocytes (%) (Auto) 3 % (24-48)    


 


Monocytes (%) (Auto) 5 % (0-9)    


 


Eosinophils (%) (Auto) 1 % (0-3)    


 


Basophils (%) (Auto) 0 % (0-3)    


 


Neutrophils # (Auto)


 20.7 x10^3/uL


(1.8-7.7) 


 


 





 


Lymphocytes # (Auto)


 0.6 x10^3/uL


(1.0-4.8) 


 


 





 


Monocytes # (Auto)


 1.1 x10^3/uL


(0.0-1.1) 


 


 





 


Eosinophils # (Auto)


 0.2 x10^3/uL


(0.0-0.7) 


 


 





 


Basophils # (Auto)


 0.0 x10^3/uL


(0.0-0.2) 


 


 





 


Sodium Level


 138 mmol/L


(136-145) 


 


 





 


Potassium Level


 4.1 mmol/L


(3.5-5.1) 


 


 





 


Chloride Level


 99 mmol/L


() 


 


 





 


Carbon Dioxide Level


 31 mmol/L


(21-32) 


 


 





 


Anion Gap 8 (6-14)    


 


Blood Urea Nitrogen


 57 mg/dL


(7-20) 


 


 





 


Creatinine


 5.1 mg/dL


(0.6-1.0) 


 


 





 


Estimated GFR


(Cockcroft-Gault) 9.0 


 


 


 





 


BUN/Creatinine Ratio 11 (6-20)    


 


Glucose Level


 212 mg/dL


(70-99) 


 


 





 


Calcium Level


 7.3 mg/dL


(8.5-10.1) 


 


 





 


Total Bilirubin


 0.3 mg/dL


(0.2-1.0) 


 


 





 


Aspartate Amino Transf


(AST/SGOT) 37 U/L (15-37) 


 


 


 





 


Alanine Aminotransferase


(ALT/SGPT) 21 U/L (14-59) 


 


 


 





 


Alkaline Phosphatase


 163 U/L


() 


 


 





 


Total Protein


 4.8 g/dL


(6.4-8.2) 


 


 





 


Albumin


 1.4 g/dL


(3.4-5.0) 


 


 





 


Albumin/Globulin Ratio 0.4 (1.0-1.7)    


 


Lipase


 162 U/L


() 


 


 





 


Glucose (Fingerstick)


 


 155 mg/dL


(70-99) 


 





 


O2 Saturation   93 % (92-99)  77 % (92-99) 


 


Arterial Blood pH


 


 


 7.22


(7.35-7.45) 7.24


(7.35-7.45)


 


Arterial Blood pCO2 at


Patient Temp 


 


 71 mmHg


(35-46) 72 mmHg


(35-46)


 


Arterial Blood pO2 at Patient


Temp 


 


 75 mmHg


() 45 mmHg


()


 


Arterial Blood HCO3


 


 


 28 mmol/L


(21-28) 30 mmol/L


(21-28)


 


Arterial Blood Base Excess


 


 


 -1 mmol/L


(-3-3) 1 mmol/L


(-3-3)


 


FiO2   45 bipap  


 


Test


 3/23/20


12:32 3/23/20


17:35 3/23/20


18:26 3/23/20


21:17


 


Glucose (Fingerstick)


 146 mg/dL


(70-99) 


 272 mg/dL


(70-99) 





 


O2 Saturation  93 % (92-99)   


 


Arterial Blood pH


 


 7.46


(7.35-7.45) 


 





 


Arterial Blood pCO2 at


Patient Temp 


 40 mmHg


(35-46) 


 





 


Arterial Blood pO2 at Patient


Temp 


 63 mmHg


() 


 





 


Arterial Blood HCO3


 


 27 mmol/L


(21-28) 


 





 


Arterial Blood Base Excess


 


 3 mmol/L


(-3-3) 


 





 


FiO2  90 +10peep   


 


Sodium Level


 


 


 


 139 mmol/L


(136-145)


 


Potassium Level


 


 


 


 3.4 mmol/L


(3.5-5.1)


 


Chloride Level


 


 


 


 101 mmol/L


()


 


Carbon Dioxide Level


 


 


 


 32 mmol/L


(21-32)


 


Anion Gap    6 (6-14) 


 


Blood Urea Nitrogen


 


 


 


 28 mg/dL


(7-20)


 


Creatinine


 


 


 


 2.6 mg/dL


(0.6-1.0)


 


Estimated GFR


(Cockcroft-Gault) 


 


 


 19.6 





 


Glucose Level


 


 


 


 198 mg/dL


(70-99)


 


Calcium Level


 


 


 


 7.5 mg/dL


(8.5-10.1)


 


Phosphorus Level


 


 


 


 2.5 mg/dL


(2.6-4.7)


 


Magnesium Level


 


 


 


 2.2 mg/dL


(1.8-2.4)


 


Test


 3/24/20


00:05 3/24/20


05:15 3/24/20


05:49 





 


Glucose (Fingerstick)


 222 mg/dL


(70-99) 


 217 mg/dL


(70-99) 





 


White Blood Count


 


 20.5 x10^3/uL


(4.0-11.0) 


 





 


Red Blood Count


 


 2.52 x10^6/uL


(3.50-5.40) 


 





 


Hemoglobin


 


 8.3 g/dL


(12.0-15.5) 


 





 


Hematocrit


 


 24.4 %


(36.0-47.0) 


 





 


Mean Corpuscular Volume  97 fL ()   


 


Mean Corpuscular Hemoglobin  33 pg (25-35)   


 


Mean Corpuscular Hemoglobin


Concent 


 34 g/dL


(31-37) 


 





 


Red Cell Distribution Width


 


 13.1 %


(11.5-14.5) 


 





 


Platelet Count


 


 242 x10^3/uL


(140-400) 


 





 


Neutrophils (%) (Auto)  93 % (31-73)   


 


Lymphocytes (%) (Auto)  2 % (24-48)   


 


Monocytes (%) (Auto)  3 % (0-9)   


 


Eosinophils (%) (Auto)  2 % (0-3)   


 


Basophils (%) (Auto)  0 % (0-3)   


 


Neutrophils # (Auto)


 


 19.1 x10^3/uL


(1.8-7.7) 


 





 


Lymphocytes # (Auto)


 


 0.4 x10^3/uL


(1.0-4.8) 


 





 


Monocytes # (Auto)


 


 0.6 x10^3/uL


(0.0-1.1) 


 





 


Eosinophils # (Auto)


 


 0.4 x10^3/uL


(0.0-0.7) 


 





 


Basophils # (Auto)


 


 0.1 x10^3/uL


(0.0-0.2) 


 





 


Sodium Level


 


 137 mmol/L


(136-145) 


 





 


Potassium Level


 


 3.3 mmol/L


(3.5-5.1) 


 





 


Chloride Level


 


 101 mmol/L


() 


 





 


Carbon Dioxide Level


 


 30 mmol/L


(21-32) 


 





 


Anion Gap  6 (6-14)   


 


Blood Urea Nitrogen


 


 32 mg/dL


(7-20) 


 





 


Creatinine


 


 2.2 mg/dL


(0.6-1.0) 


 





 


Estimated GFR


(Cockcroft-Gault) 


 23.7 


 


 





 


Glucose Level


 


 239 mg/dL


(70-99) 


 





 


Calcium Level


 


 7.5 mg/dL


(8.5-10.1) 


 





 


Phosphorus Level


 


 2.2 mg/dL


(2.6-4.7) 


 





 


Magnesium Level


 


 2.2 mg/dL


(1.8-2.4) 


 





 


Thyroid Stimulating Hormone


(TSH) 


 1.091 uIU/mL


(0.358-3.74) 


 











Laboratory Tests








Test


 3/23/20


09:30 3/23/20


12:32 3/23/20


17:35 3/23/20


18:26


 


O2 Saturation 77 % (92-99)   93 % (92-99)  


 


Arterial Blood pH


 7.24


(7.35-7.45) 


 7.46


(7.35-7.45) 





 


Arterial Blood pCO2 at


Patient Temp 72 mmHg


(35-46) 


 40 mmHg


(35-46) 





 


Arterial Blood pO2 at Patient


Temp 45 mmHg


() 


 63 mmHg


() 





 


Arterial Blood HCO3


 30 mmol/L


(21-28) 


 27 mmol/L


(21-28) 





 


Arterial Blood Base Excess


 1 mmol/L


(-3-3) 


 3 mmol/L


(-3-3) 





 


Glucose (Fingerstick)


 


 146 mg/dL


(70-99) 


 272 mg/dL


(70-99)


 


FiO2   90 +10peep  


 


Test


 3/23/20


21:17 3/24/20


00:05 3/24/20


05:15 3/24/20


05:49


 


Sodium Level


 139 mmol/L


(136-145) 


 137 mmol/L


(136-145) 





 


Potassium Level


 3.4 mmol/L


(3.5-5.1) 


 3.3 mmol/L


(3.5-5.1) 





 


Chloride Level


 101 mmol/L


() 


 101 mmol/L


() 





 


Carbon Dioxide Level


 32 mmol/L


(21-32) 


 30 mmol/L


(21-32) 





 


Anion Gap 6 (6-14)   6 (6-14)  


 


Blood Urea Nitrogen


 28 mg/dL


(7-20) 


 32 mg/dL


(7-20) 





 


Creatinine


 2.6 mg/dL


(0.6-1.0) 


 2.2 mg/dL


(0.6-1.0) 





 


Estimated GFR


(Cockcroft-Gault) 19.6 


 


 23.7 


 





 


Glucose Level


 198 mg/dL


(70-99) 


 239 mg/dL


(70-99) 





 


Calcium Level


 7.5 mg/dL


(8.5-10.1) 


 7.5 mg/dL


(8.5-10.1) 





 


Phosphorus Level


 2.5 mg/dL


(2.6-4.7) 


 2.2 mg/dL


(2.6-4.7) 





 


Magnesium Level


 2.2 mg/dL


(1.8-2.4) 


 2.2 mg/dL


(1.8-2.4) 





 


Glucose (Fingerstick)


 


 222 mg/dL


(70-99) 


 217 mg/dL


(70-99)


 


White Blood Count


 


 


 20.5 x10^3/uL


(4.0-11.0) 





 


Red Blood Count


 


 


 2.52 x10^6/uL


(3.50-5.40) 





 


Hemoglobin


 


 


 8.3 g/dL


(12.0-15.5) 





 


Hematocrit


 


 


 24.4 %


(36.0-47.0) 





 


Mean Corpuscular Volume   97 fL ()  


 


Mean Corpuscular Hemoglobin   33 pg (25-35)  


 


Mean Corpuscular Hemoglobin


Concent 


 


 34 g/dL


(31-37) 





 


Red Cell Distribution Width


 


 


 13.1 %


(11.5-14.5) 





 


Platelet Count


 


 


 242 x10^3/uL


(140-400) 





 


Neutrophils (%) (Auto)   93 % (31-73)  


 


Lymphocytes (%) (Auto)   2 % (24-48)  


 


Monocytes (%) (Auto)   3 % (0-9)  


 


Eosinophils (%) (Auto)   2 % (0-3)  


 


Basophils (%) (Auto)   0 % (0-3)  


 


Neutrophils # (Auto)


 


 


 19.1 x10^3/uL


(1.8-7.7) 





 


Lymphocytes # (Auto)


 


 


 0.4 x10^3/uL


(1.0-4.8) 





 


Monocytes # (Auto)


 


 


 0.6 x10^3/uL


(0.0-1.1) 





 


Eosinophils # (Auto)


 


 


 0.4 x10^3/uL


(0.0-0.7) 





 


Basophils # (Auto)


 


 


 0.1 x10^3/uL


(0.0-0.2) 





 


Thyroid Stimulating Hormone


(TSH) 


 


 1.091 uIU/mL


(0.358-3.74) 











Medications





Active Scripts








 Medications  Dose


 Route/Sig


 Max Daily Dose Days Date Category


 


 Bisoprolol


 Fumarate 5 Mg


 Tablet  10 Mg


 PO DAILY


   3/16/20 Reported











Impression


.


IMPRESSION:


1.  Acute hypoxemic respiratory failure secondary to acute pancreatitis, sepsis,

abdominal distention, and pneumonia.


2.  Gallstone pancreatitis. WITH NECROSIS


3.  Severe metabolic acidosis.


4.  Acute kidney injury. ON HD


5.  Acute gallstone pancreatitis.


6.  Hypoalbuminemia.


7.  Hypocalcemia.


8.  Leukocytosis


9.  Chronic anemia





Plan


.


ABG noted


Replace potassium


Patient currently on C RRT


Repeat CT once stable


Antibiotics per ID


Follow surgery input


Follow nephrology input


Nutritional support with TPN


Prognosis is











STEVE MIRANDA MD              Mar 24, 2020 09:19

## 2020-03-24 NOTE — NUR
Pharmacy TPN Dosing Note



S: JESENIA RICH is a 49 year old F Currently receiving Central Continuous TPN started 
03/18/20



B:Pertinent PMH: 

Necrotizing pancreatitis

Height: 5 feet, 8 inches

Weight: 108.0 kg



Current diet: NPO 



LABS:

Sodium:    137 

Potassium: 3.3 

Chloride:  101 

Calcium:   7.5 

Corrected Calcium: 9.58 

Magnesium: 2.2 

CO2:       31 

SCr:       5.1 

Glucose:   155 

Albumin:   1.4 

AST:       56 

ALT:       45 



TPN FORMULA:

TPN TYPE:  Central Continuous

AMINO ACIDS:         125 gm

DEXTROSE:            195 gm

LIPIDS:              40 gm

SODIUM CHLORIDE:     90 mEq

SODIUM ACETATE:      -- mEq

SODIUM PHOSPHATE:    -- mmol

POTASSIUM CHLORIDE:  15 mEq

POTASSIUM ACETATE:   -- mEq

POTASSIUM PHOSPHATE: 15 mmol

MAGNESIUM:           10 mEq

CALCIUM:             15 mEq

INSULIN:             -- units

MULTIPLE VITAMIN:    10 ml

TRACE ELEMENTS:      0.5 ml(s)



TPN PLAN:  

Decrease calcium in order to increase kpo4. Pt received 20mmol kpo4 bolus.





R: Continue TPN as ordered

Will monitor electrolytes, glucose, and tolerance to TPN.



 CHRISTIANNE MELO, [facility], 03/24/20 [id number]

## 2020-03-24 NOTE — PDOC
SAURAV NASH APRN 3/24/20 0900:


SURGICAL PROGRESS NOTE


Subjective


vent support


d/w nursing


CRRT started


pressor support


Vital Signs





Vital Signs








  Date Time  Temp Pulse Resp B/P (MAP) Pulse Ox O2 Delivery O2 Flow Rate FiO2


 


3/24/20 08:04      Mechanical Ventilator  


 


3/24/20 08:02  126 25 115/80 (92) 100   


 


3/24/20 07:35 100.4       





 100.4       








I&O











Intake and Output 


 


 3/24/20





 07:00


 


Intake Total 2914 ml


 


Output Total 495 ml


 


Balance 2419 ml


 


 


 


IV Total 2914 ml


 


Output Urine Total 95 ml


 


Stool Total 200 ml


 


Gastric Drainage Total 200 ml








PATIENT HAS A VILLASENOR:  Yes


General:  No acute distress


Abdomen:  Soft, Other (ND)


Skin:  Other (mottling noted to extremities )


Labs





Laboratory Tests








Test


 3/22/20


11:50 3/22/20


18:18 3/23/20


00:38 3/23/20


05:32


 


Glucose (Fingerstick)


 216 mg/dL


(70-99) 223 mg/dL


(70-99) 260 mg/dL


(70-99) 





 


O2 Saturation    99 % (92-99) 


 


Arterial Blood pH


 


 


 


 7.20


(7.35-7.45)


 


Arterial Blood pCO2 at


Patient Temp 


 


 


 78 mmHg


(35-46)


 


Arterial Blood pO2 at Patient


Temp 


 


 


 324 mmHg


()


 


Arterial Blood HCO3


 


 


 


 30 mmol/L


(21-28)


 


Arterial Blood Base Excess


 


 


 


 1 mmol/L


(-3-3)


 


FiO2    100 


 


Test


 3/23/20


05:50 3/23/20


05:56 3/23/20


08:00 3/23/20


09:30


 


White Blood Count


 22.6 x10^3/uL


(4.0-11.0) 


 


 





 


Red Blood Count


 2.86 x10^6/uL


(3.50-5.40) 


 


 





 


Hemoglobin


 9.5 g/dL


(12.0-15.5) 


 


 





 


Hematocrit


 28.6 %


(36.0-47.0) 


 


 





 


Mean Corpuscular Volume


 100 fL


() 


 


 





 


Mean Corpuscular Hemoglobin 33 pg (25-35)    


 


Mean Corpuscular Hemoglobin


Concent 33 g/dL


(31-37) 


 


 





 


Red Cell Distribution Width


 14.0 %


(11.5-14.5) 


 


 





 


Platelet Count


 291 x10^3/uL


(140-400) 


 


 





 


Neutrophils (%) (Auto) 91 % (31-73)    


 


Lymphocytes (%) (Auto) 3 % (24-48)    


 


Monocytes (%) (Auto) 5 % (0-9)    


 


Eosinophils (%) (Auto) 1 % (0-3)    


 


Basophils (%) (Auto) 0 % (0-3)    


 


Neutrophils # (Auto)


 20.7 x10^3/uL


(1.8-7.7) 


 


 





 


Lymphocytes # (Auto)


 0.6 x10^3/uL


(1.0-4.8) 


 


 





 


Monocytes # (Auto)


 1.1 x10^3/uL


(0.0-1.1) 


 


 





 


Eosinophils # (Auto)


 0.2 x10^3/uL


(0.0-0.7) 


 


 





 


Basophils # (Auto)


 0.0 x10^3/uL


(0.0-0.2) 


 


 





 


Sodium Level


 138 mmol/L


(136-145) 


 


 





 


Potassium Level


 4.1 mmol/L


(3.5-5.1) 


 


 





 


Chloride Level


 99 mmol/L


() 


 


 





 


Carbon Dioxide Level


 31 mmol/L


(21-32) 


 


 





 


Anion Gap 8 (6-14)    


 


Blood Urea Nitrogen


 57 mg/dL


(7-20) 


 


 





 


Creatinine


 5.1 mg/dL


(0.6-1.0) 


 


 





 


Estimated GFR


(Cockcroft-Gault) 9.0 


 


 


 





 


BUN/Creatinine Ratio 11 (6-20)    


 


Glucose Level


 212 mg/dL


(70-99) 


 


 





 


Calcium Level


 7.3 mg/dL


(8.5-10.1) 


 


 





 


Total Bilirubin


 0.3 mg/dL


(0.2-1.0) 


 


 





 


Aspartate Amino Transf


(AST/SGOT) 37 U/L (15-37) 


 


 


 





 


Alanine Aminotransferase


(ALT/SGPT) 21 U/L (14-59) 


 


 


 





 


Alkaline Phosphatase


 163 U/L


() 


 


 





 


Total Protein


 4.8 g/dL


(6.4-8.2) 


 


 





 


Albumin


 1.4 g/dL


(3.4-5.0) 


 


 





 


Albumin/Globulin Ratio 0.4 (1.0-1.7)    


 


Lipase


 162 U/L


() 


 


 





 


Glucose (Fingerstick)


 


 155 mg/dL


(70-99) 


 





 


O2 Saturation   93 % (92-99)  77 % (92-99) 


 


Arterial Blood pH


 


 


 7.22


(7.35-7.45) 7.24


(7.35-7.45)


 


Arterial Blood pCO2 at


Patient Temp 


 


 71 mmHg


(35-46) 72 mmHg


(35-46)


 


Arterial Blood pO2 at Patient


Temp 


 


 75 mmHg


() 45 mmHg


()


 


Arterial Blood HCO3


 


 


 28 mmol/L


(21-28) 30 mmol/L


(21-28)


 


Arterial Blood Base Excess


 


 


 -1 mmol/L


(-3-3) 1 mmol/L


(-3-3)


 


FiO2   45 bipap  


 


Test


 3/23/20


12:32 3/23/20


17:35 3/23/20


18:26 3/23/20


21:17


 


Glucose (Fingerstick)


 146 mg/dL


(70-99) 


 272 mg/dL


(70-99) 





 


O2 Saturation  93 % (92-99)   


 


Arterial Blood pH


 


 7.46


(7.35-7.45) 


 





 


Arterial Blood pCO2 at


Patient Temp 


 40 mmHg


(35-46) 


 





 


Arterial Blood pO2 at Patient


Temp 


 63 mmHg


() 


 





 


Arterial Blood HCO3


 


 27 mmol/L


(21-28) 


 





 


Arterial Blood Base Excess


 


 3 mmol/L


(-3-3) 


 





 


FiO2  90 +10peep   


 


Sodium Level


 


 


 


 139 mmol/L


(136-145)


 


Potassium Level


 


 


 


 3.4 mmol/L


(3.5-5.1)


 


Chloride Level


 


 


 


 101 mmol/L


()


 


Carbon Dioxide Level


 


 


 


 32 mmol/L


(21-32)


 


Anion Gap    6 (6-14) 


 


Blood Urea Nitrogen


 


 


 


 28 mg/dL


(7-20)


 


Creatinine


 


 


 


 2.6 mg/dL


(0.6-1.0)


 


Estimated GFR


(Cockcroft-Gault) 


 


 


 19.6 





 


Glucose Level


 


 


 


 198 mg/dL


(70-99)


 


Calcium Level


 


 


 


 7.5 mg/dL


(8.5-10.1)


 


Phosphorus Level


 


 


 


 2.5 mg/dL


(2.6-4.7)


 


Magnesium Level


 


 


 


 2.2 mg/dL


(1.8-2.4)


 


Test


 3/24/20


00:05 3/24/20


05:15 3/24/20


05:49 





 


Glucose (Fingerstick)


 222 mg/dL


(70-99) 


 217 mg/dL


(70-99) 





 


White Blood Count


 


 20.5 x10^3/uL


(4.0-11.0) 


 





 


Red Blood Count


 


 2.52 x10^6/uL


(3.50-5.40) 


 





 


Hemoglobin


 


 8.3 g/dL


(12.0-15.5) 


 





 


Hematocrit


 


 24.4 %


(36.0-47.0) 


 





 


Mean Corpuscular Volume  97 fL ()   


 


Mean Corpuscular Hemoglobin  33 pg (25-35)   


 


Mean Corpuscular Hemoglobin


Concent 


 34 g/dL


(31-37) 


 





 


Red Cell Distribution Width


 


 13.1 %


(11.5-14.5) 


 





 


Platelet Count


 


 242 x10^3/uL


(140-400) 


 





 


Neutrophils (%) (Auto)  93 % (31-73)   


 


Lymphocytes (%) (Auto)  2 % (24-48)   


 


Monocytes (%) (Auto)  3 % (0-9)   


 


Eosinophils (%) (Auto)  2 % (0-3)   


 


Basophils (%) (Auto)  0 % (0-3)   


 


Neutrophils # (Auto)


 


 19.1 x10^3/uL


(1.8-7.7) 


 





 


Lymphocytes # (Auto)


 


 0.4 x10^3/uL


(1.0-4.8) 


 





 


Monocytes # (Auto)


 


 0.6 x10^3/uL


(0.0-1.1) 


 





 


Eosinophils # (Auto)


 


 0.4 x10^3/uL


(0.0-0.7) 


 





 


Basophils # (Auto)


 


 0.1 x10^3/uL


(0.0-0.2) 


 





 


Sodium Level


 


 137 mmol/L


(136-145) 


 





 


Potassium Level


 


 3.3 mmol/L


(3.5-5.1) 


 





 


Chloride Level


 


 101 mmol/L


() 


 





 


Carbon Dioxide Level


 


 30 mmol/L


(21-32) 


 





 


Anion Gap  6 (6-14)   


 


Blood Urea Nitrogen


 


 32 mg/dL


(7-20) 


 





 


Creatinine


 


 2.2 mg/dL


(0.6-1.0) 


 





 


Estimated GFR


(Cockcroft-Gault) 


 23.7 


 


 





 


Glucose Level


 


 239 mg/dL


(70-99) 


 





 


Calcium Level


 


 7.5 mg/dL


(8.5-10.1) 


 





 


Phosphorus Level


 


 2.2 mg/dL


(2.6-4.7) 


 





 


Magnesium Level


 


 2.2 mg/dL


(1.8-2.4) 


 











Laboratory Tests








Test


 3/23/20


09:30 3/23/20


12:32 3/23/20


17:35 3/23/20


18:26


 


O2 Saturation 77 % (92-99)   93 % (92-99)  


 


Arterial Blood pH


 7.24


(7.35-7.45) 


 7.46


(7.35-7.45) 





 


Arterial Blood pCO2 at


Patient Temp 72 mmHg


(35-46) 


 40 mmHg


(35-46) 





 


Arterial Blood pO2 at Patient


Temp 45 mmHg


() 


 63 mmHg


() 





 


Arterial Blood HCO3


 30 mmol/L


(21-28) 


 27 mmol/L


(21-28) 





 


Arterial Blood Base Excess


 1 mmol/L


(-3-3) 


 3 mmol/L


(-3-3) 





 


Glucose (Fingerstick)


 


 146 mg/dL


(70-99) 


 272 mg/dL


(70-99)


 


FiO2   90 +10peep  


 


Test


 3/23/20


21:17 3/24/20


00:05 3/24/20


05:15 3/24/20


05:49


 


Sodium Level


 139 mmol/L


(136-145) 


 137 mmol/L


(136-145) 





 


Potassium Level


 3.4 mmol/L


(3.5-5.1) 


 3.3 mmol/L


(3.5-5.1) 





 


Chloride Level


 101 mmol/L


() 


 101 mmol/L


() 





 


Carbon Dioxide Level


 32 mmol/L


(21-32) 


 30 mmol/L


(21-32) 





 


Anion Gap 6 (6-14)   6 (6-14)  


 


Blood Urea Nitrogen


 28 mg/dL


(7-20) 


 32 mg/dL


(7-20) 





 


Creatinine


 2.6 mg/dL


(0.6-1.0) 


 2.2 mg/dL


(0.6-1.0) 





 


Estimated GFR


(Cockcroft-Gault) 19.6 


 


 23.7 


 





 


Glucose Level


 198 mg/dL


(70-99) 


 239 mg/dL


(70-99) 





 


Calcium Level


 7.5 mg/dL


(8.5-10.1) 


 7.5 mg/dL


(8.5-10.1) 





 


Phosphorus Level


 2.5 mg/dL


(2.6-4.7) 


 2.2 mg/dL


(2.6-4.7) 





 


Magnesium Level


 2.2 mg/dL


(1.8-2.4) 


 2.2 mg/dL


(1.8-2.4) 





 


Glucose (Fingerstick)


 


 222 mg/dL


(70-99) 


 217 mg/dL


(70-99)


 


White Blood Count


 


 


 20.5 x10^3/uL


(4.0-11.0) 





 


Red Blood Count


 


 


 2.52 x10^6/uL


(3.50-5.40) 





 


Hemoglobin


 


 


 8.3 g/dL


(12.0-15.5) 





 


Hematocrit


 


 


 24.4 %


(36.0-47.0) 





 


Mean Corpuscular Volume   97 fL ()  


 


Mean Corpuscular Hemoglobin   33 pg (25-35)  


 


Mean Corpuscular Hemoglobin


Concent 


 


 34 g/dL


(31-37) 





 


Red Cell Distribution Width


 


 


 13.1 %


(11.5-14.5) 





 


Platelet Count


 


 


 242 x10^3/uL


(140-400) 





 


Neutrophils (%) (Auto)   93 % (31-73)  


 


Lymphocytes (%) (Auto)   2 % (24-48)  


 


Monocytes (%) (Auto)   3 % (0-9)  


 


Eosinophils (%) (Auto)   2 % (0-3)  


 


Basophils (%) (Auto)   0 % (0-3)  


 


Neutrophils # (Auto)


 


 


 19.1 x10^3/uL


(1.8-7.7) 





 


Lymphocytes # (Auto)


 


 


 0.4 x10^3/uL


(1.0-4.8) 





 


Monocytes # (Auto)


 


 


 0.6 x10^3/uL


(0.0-1.1) 





 


Eosinophils # (Auto)


 


 


 0.4 x10^3/uL


(0.0-0.7) 





 


Basophils # (Auto)


 


 


 0.1 x10^3/uL


(0.0-0.2) 











Problem List


Problems


Medical Problems:


(1) Acute pancreatitis


Status: Acute  





(2) Cholelithiasis


Status: Acute  








Assessment/Plan


severe pancreatitis


supportive/critical care


will have Dr Flores review





GAMAL FLORES MD 3/24/20 1106:


SURGICAL PROGRESS NOTE


Assessment/Plan


Pt seen and examined.


Agree with Ms. Korey's note


Pt intubated, some decrease in oxygen needs, on pressors


abd distended, NTTP


some min clinical improvement, but remains prohibitive surgical candidate.


cont supportive care











SAURAV NASH            Mar 24, 2020 09:00


GAMAL FLORES MD             Mar 24, 2020 11:06

## 2020-03-25 VITALS — SYSTOLIC BLOOD PRESSURE: 99 MMHG | DIASTOLIC BLOOD PRESSURE: 65 MMHG

## 2020-03-25 VITALS — DIASTOLIC BLOOD PRESSURE: 71 MMHG | SYSTOLIC BLOOD PRESSURE: 95 MMHG

## 2020-03-25 VITALS — DIASTOLIC BLOOD PRESSURE: 50 MMHG | SYSTOLIC BLOOD PRESSURE: 93 MMHG

## 2020-03-25 VITALS — DIASTOLIC BLOOD PRESSURE: 69 MMHG | SYSTOLIC BLOOD PRESSURE: 113 MMHG

## 2020-03-25 VITALS — DIASTOLIC BLOOD PRESSURE: 48 MMHG | SYSTOLIC BLOOD PRESSURE: 87 MMHG

## 2020-03-25 VITALS — SYSTOLIC BLOOD PRESSURE: 92 MMHG | DIASTOLIC BLOOD PRESSURE: 52 MMHG

## 2020-03-25 VITALS — DIASTOLIC BLOOD PRESSURE: 67 MMHG | SYSTOLIC BLOOD PRESSURE: 95 MMHG

## 2020-03-25 VITALS — DIASTOLIC BLOOD PRESSURE: 53 MMHG | SYSTOLIC BLOOD PRESSURE: 100 MMHG

## 2020-03-25 VITALS — DIASTOLIC BLOOD PRESSURE: 59 MMHG | SYSTOLIC BLOOD PRESSURE: 111 MMHG

## 2020-03-25 VITALS — SYSTOLIC BLOOD PRESSURE: 48 MMHG | DIASTOLIC BLOOD PRESSURE: 47 MMHG

## 2020-03-25 VITALS — SYSTOLIC BLOOD PRESSURE: 90 MMHG | DIASTOLIC BLOOD PRESSURE: 47 MMHG

## 2020-03-25 VITALS — DIASTOLIC BLOOD PRESSURE: 48 MMHG | SYSTOLIC BLOOD PRESSURE: 98 MMHG

## 2020-03-25 VITALS — DIASTOLIC BLOOD PRESSURE: 64 MMHG | SYSTOLIC BLOOD PRESSURE: 89 MMHG

## 2020-03-25 VITALS — SYSTOLIC BLOOD PRESSURE: 95 MMHG | DIASTOLIC BLOOD PRESSURE: 80 MMHG

## 2020-03-25 VITALS — DIASTOLIC BLOOD PRESSURE: 68 MMHG | SYSTOLIC BLOOD PRESSURE: 111 MMHG

## 2020-03-25 VITALS — DIASTOLIC BLOOD PRESSURE: 57 MMHG | SYSTOLIC BLOOD PRESSURE: 127 MMHG

## 2020-03-25 VITALS — DIASTOLIC BLOOD PRESSURE: 6 MMHG | SYSTOLIC BLOOD PRESSURE: 113 MMHG

## 2020-03-25 VITALS — DIASTOLIC BLOOD PRESSURE: 64 MMHG | SYSTOLIC BLOOD PRESSURE: 91 MMHG

## 2020-03-25 VITALS — SYSTOLIC BLOOD PRESSURE: 95 MMHG | DIASTOLIC BLOOD PRESSURE: 60 MMHG

## 2020-03-25 VITALS — DIASTOLIC BLOOD PRESSURE: 52 MMHG | SYSTOLIC BLOOD PRESSURE: 105 MMHG

## 2020-03-25 VITALS — SYSTOLIC BLOOD PRESSURE: 96 MMHG | DIASTOLIC BLOOD PRESSURE: 47 MMHG

## 2020-03-25 VITALS — SYSTOLIC BLOOD PRESSURE: 105 MMHG | DIASTOLIC BLOOD PRESSURE: 75 MMHG

## 2020-03-25 VITALS — DIASTOLIC BLOOD PRESSURE: 56 MMHG | SYSTOLIC BLOOD PRESSURE: 105 MMHG

## 2020-03-25 VITALS — DIASTOLIC BLOOD PRESSURE: 47 MMHG | SYSTOLIC BLOOD PRESSURE: 86 MMHG

## 2020-03-25 LAB
% BANDS: 19 % (ref 0–9)
% LYMPHS: 3 % (ref 24–48)
% METAS: 3 % (ref 0–0)
% MONOS: 1 % (ref 0–10)
% MYELOS: 1 % (ref 0–0)
% SEGS: 73 % (ref 35–66)
ALBUMIN SERPL-MCNC: 1.4 G/DL (ref 3.4–5)
ALBUMIN/GLOB SERPL: 0.5 {RATIO} (ref 1–1.7)
ALP SERPL-CCNC: 82 U/L (ref 46–116)
ALT SERPL-CCNC: 12 U/L (ref 14–59)
ANION GAP SERPL CALC-SCNC: 12 MMOL/L (ref 6–14)
ANION GAP SERPL CALC-SCNC: 13 MMOL/L (ref 6–14)
ANION GAP SERPL CALC-SCNC: 7 MMOL/L (ref 6–14)
ANION GAP SERPL CALC-SCNC: 8 MMOL/L (ref 6–14)
AST SERPL-CCNC: 27 U/L (ref 15–37)
BASE EXCESS ABG: -2 MMOL/L (ref -3–3)
BASOPHILS # BLD AUTO: 0 X10^3/UL (ref 0–0.2)
BASOPHILS NFR BLD: 0 % (ref 0–3)
BILIRUB SERPL-MCNC: 0.4 MG/DL (ref 0.2–1)
BUN SERPL-MCNC: 42 MG/DL (ref 7–20)
BUN SERPL-MCNC: 44 MG/DL (ref 7–20)
BUN SERPL-MCNC: 49 MG/DL (ref 7–20)
BUN SERPL-MCNC: 50 MG/DL (ref 7–20)
BUN/CREAT SERPL: 19 (ref 6–20)
CALCIUM SERPL-MCNC: 7.4 MG/DL (ref 8.5–10.1)
CALCIUM SERPL-MCNC: 7.5 MG/DL (ref 8.5–10.1)
CALCIUM SERPL-MCNC: 7.5 MG/DL (ref 8.5–10.1)
CALCIUM SERPL-MCNC: 7.7 MG/DL (ref 8.5–10.1)
CHLORIDE SERPL-SCNC: 103 MMOL/L (ref 98–107)
CHLORIDE SERPL-SCNC: 103 MMOL/L (ref 98–107)
CHLORIDE SERPL-SCNC: 104 MMOL/L (ref 98–107)
CHLORIDE SERPL-SCNC: 104 MMOL/L (ref 98–107)
CO2 SERPL-SCNC: 23 MMOL/L (ref 21–32)
CO2 SERPL-SCNC: 25 MMOL/L (ref 21–32)
CO2 SERPL-SCNC: 26 MMOL/L (ref 21–32)
CO2 SERPL-SCNC: 27 MMOL/L (ref 21–32)
CREAT SERPL-MCNC: 2 MG/DL (ref 0.6–1)
CREAT SERPL-MCNC: 2.2 MG/DL (ref 0.6–1)
CREAT SERPL-MCNC: 2.5 MG/DL (ref 0.6–1)
CREAT SERPL-MCNC: 2.7 MG/DL (ref 0.6–1)
DOHLE BOD BLD QL SMEAR: (no result)
EOSINOPHIL NFR BLD: 0.3 X10^3/UL (ref 0–0.7)
EOSINOPHIL NFR BLD: 1 % (ref 0–3)
ERYTHROCYTE [DISTWIDTH] IN BLOOD BY AUTOMATED COUNT: 13.4 % (ref 11.5–14.5)
GFR SERPLBLD BASED ON 1.73 SQ M-ARVRAT: 18.7 ML/MIN
GFR SERPLBLD BASED ON 1.73 SQ M-ARVRAT: 20.5 ML/MIN
GFR SERPLBLD BASED ON 1.73 SQ M-ARVRAT: 23.7 ML/MIN
GFR SERPLBLD BASED ON 1.73 SQ M-ARVRAT: 26.5 ML/MIN
GLOBULIN SER-MCNC: 2.9 G/DL (ref 2.2–3.8)
GLUCOSE SERPL-MCNC: 195 MG/DL (ref 70–99)
GLUCOSE SERPL-MCNC: 270 MG/DL (ref 70–99)
GLUCOSE SERPL-MCNC: 274 MG/DL (ref 70–99)
GLUCOSE SERPL-MCNC: 290 MG/DL (ref 70–99)
HCO3 BLDA-SCNC: 22 MMOL/L (ref 21–28)
HCT VFR BLD CALC: 23.6 % (ref 36–47)
HGB BLD-MCNC: 7.8 G/DL (ref 12–15.5)
INSPIRATION SETTING TIME VENT: 40
LYMPHOCYTES # BLD: 1 X10^3/UL (ref 1–4.8)
LYMPHOCYTES NFR BLD AUTO: 4 % (ref 24–48)
MAGNESIUM SERPL-MCNC: 2.2 MG/DL (ref 1.8–2.4)
MAGNESIUM SERPL-MCNC: 2.3 MG/DL (ref 1.8–2.4)
MAGNESIUM SERPL-MCNC: 2.3 MG/DL (ref 1.8–2.4)
MCH RBC QN AUTO: 33 PG (ref 25–35)
MCHC RBC AUTO-ENTMCNC: 33 G/DL (ref 31–37)
MCV RBC AUTO: 98 FL (ref 79–100)
MONO #: 1.2 X10^3/UL (ref 0–1.1)
MONOCYTES NFR BLD: 5 % (ref 0–9)
NEUT #: 23.8 X10^3/UL (ref 1.8–7.7)
NEUTROPHILS NFR BLD AUTO: 90 % (ref 31–73)
PCO2 BLDA: 32 MMHG (ref 35–46)
PHOSPHATE SERPL-MCNC: 2.3 MG/DL (ref 2.6–4.7)
PHOSPHATE SERPL-MCNC: 2.4 MG/DL (ref 2.6–4.7)
PHOSPHATE SERPL-MCNC: 3.6 MG/DL (ref 2.6–4.7)
PHOSPHATE SERPL-MCNC: 3.9 MG/DL (ref 2.6–4.7)
PLATELET # BLD AUTO: 292 X10^3/UL (ref 140–400)
PLATELET # BLD EST: ADEQUATE 10*3/UL
PO2 BLDA: 99 MMHG (ref 75–108)
POTASSIUM SERPL-SCNC: 3.6 MMOL/L (ref 3.5–5.1)
POTASSIUM SERPL-SCNC: 3.7 MMOL/L (ref 3.5–5.1)
POTASSIUM SERPL-SCNC: 3.7 MMOL/L (ref 3.5–5.1)
POTASSIUM SERPL-SCNC: 3.8 MMOL/L (ref 3.5–5.1)
PROT SERPL-MCNC: 4.3 G/DL (ref 6.4–8.2)
RBC # BLD AUTO: 2.41 X10^6/UL (ref 3.5–5.4)
SAO2 % BLDA: 97 % (ref 92–99)
SODIUM SERPL-SCNC: 137 MMOL/L (ref 136–145)
SODIUM SERPL-SCNC: 138 MMOL/L (ref 136–145)
SODIUM SERPL-SCNC: 139 MMOL/L (ref 136–145)
SODIUM SERPL-SCNC: 141 MMOL/L (ref 136–145)
TOXIC GRANULES BLD QL SMEAR: SLIGHT
WBC # BLD AUTO: 26.3 X10^3/UL (ref 4–11)

## 2020-03-25 PROCEDURE — 05PYX3Z REMOVAL OF INFUSION DEVICE FROM UPPER VEIN, EXTERNAL APPROACH: ICD-10-PCS | Performed by: RADIOLOGY

## 2020-03-25 PROCEDURE — 05HM03Z INSERTION OF INFUSION DEVICE INTO RIGHT INTERNAL JUGULAR VEIN, OPEN APPROACH: ICD-10-PCS | Performed by: RADIOLOGY

## 2020-03-25 RX ADMIN — METOPROLOL TARTRATE SCH MG: 5 INJECTION INTRAVENOUS at 17:32

## 2020-03-25 RX ADMIN — POTASSIUM CHLORIDE SCH MLS/HR: 2 INJECTION, SOLUTION, CONCENTRATE INTRAVENOUS at 21:29

## 2020-03-25 RX ADMIN — Medication PRN EACH: at 12:27

## 2020-03-25 RX ADMIN — POTASSIUM CHLORIDE SCH MLS/HR: 2 INJECTION, SOLUTION, CONCENTRATE INTRAVENOUS at 16:24

## 2020-03-25 RX ADMIN — ALBUMIN (HUMAN) PRN MLS/HR: 12.5 INJECTION, SOLUTION INTRAVENOUS at 20:44

## 2020-03-25 RX ADMIN — POTASSIUM CHLORIDE SCH MLS/HR: 2 INJECTION, SOLUTION, CONCENTRATE INTRAVENOUS at 22:37

## 2020-03-25 RX ADMIN — METOPROLOL TARTRATE SCH MG: 5 INJECTION INTRAVENOUS at 23:31

## 2020-03-25 RX ADMIN — POTASSIUM CHLORIDE SCH MLS/HR: 2 INJECTION, SOLUTION, CONCENTRATE INTRAVENOUS at 08:04

## 2020-03-25 RX ADMIN — HEPARIN SODIUM SCH UNIT: 5000 INJECTION, SOLUTION INTRAVENOUS; SUBCUTANEOUS at 13:30

## 2020-03-25 RX ADMIN — DAPTOMYCIN SCH MLS/HR: 500 INJECTION, POWDER, LYOPHILIZED, FOR SOLUTION INTRAVENOUS at 08:45

## 2020-03-25 RX ADMIN — MEROPENEM SCH MLS/HR: 500 INJECTION, POWDER, FOR SOLUTION INTRAVENOUS at 06:00

## 2020-03-25 RX ADMIN — POTASSIUM CHLORIDE SCH MLS/HR: 2 INJECTION, SOLUTION, CONCENTRATE INTRAVENOUS at 03:03

## 2020-03-25 RX ADMIN — INSULIN LISPRO SCH UNITS: 100 INJECTION, SOLUTION INTRAVENOUS; SUBCUTANEOUS at 12:42

## 2020-03-25 RX ADMIN — MIDAZOLAM HYDROCHLORIDE PRN MLS/HR: 5 INJECTION, SOLUTION INTRAMUSCULAR; INTRAVENOUS at 12:46

## 2020-03-25 RX ADMIN — METOPROLOL TARTRATE SCH MG: 5 INJECTION INTRAVENOUS at 02:54

## 2020-03-25 RX ADMIN — MIDAZOLAM HYDROCHLORIDE PRN MLS/HR: 5 INJECTION, SOLUTION INTRAMUSCULAR; INTRAVENOUS at 20:43

## 2020-03-25 RX ADMIN — POTASSIUM CHLORIDE SCH MLS/HR: 2 INJECTION, SOLUTION, CONCENTRATE INTRAVENOUS at 08:54

## 2020-03-25 RX ADMIN — POTASSIUM CHLORIDE SCH MLS/HR: 2 INJECTION, SOLUTION, CONCENTRATE INTRAVENOUS at 02:33

## 2020-03-25 RX ADMIN — METOPROLOL TARTRATE SCH MG: 5 INJECTION INTRAVENOUS at 06:00

## 2020-03-25 RX ADMIN — POTASSIUM CHLORIDE SCH MLS/HR: 2 INJECTION, SOLUTION, CONCENTRATE INTRAVENOUS at 16:26

## 2020-03-25 RX ADMIN — POTASSIUM CHLORIDE SCH MLS/HR: 2 INJECTION, SOLUTION, CONCENTRATE INTRAVENOUS at 21:28

## 2020-03-25 RX ADMIN — CEFEPIME SCH GM: 2 INJECTION, POWDER, FOR SOLUTION INTRAVENOUS at 09:23

## 2020-03-25 RX ADMIN — METOPROLOL TARTRATE SCH MG: 5 INJECTION INTRAVENOUS at 12:00

## 2020-03-25 RX ADMIN — MIDAZOLAM HYDROCHLORIDE PRN MLS/HR: 5 INJECTION, SOLUTION INTRAMUSCULAR; INTRAVENOUS at 02:51

## 2020-03-25 RX ADMIN — DEXTROSE SCH MLS/HR: 50 INJECTION, SOLUTION INTRAVENOUS at 09:16

## 2020-03-25 RX ADMIN — POTASSIUM CHLORIDE SCH MLS/HR: 2 INJECTION, SOLUTION, CONCENTRATE INTRAVENOUS at 07:34

## 2020-03-25 RX ADMIN — INSULIN LISPRO SCH UNITS: 100 INJECTION, SOLUTION INTRAVENOUS; SUBCUTANEOUS at 17:47

## 2020-03-25 RX ADMIN — INSULIN LISPRO SCH UNITS: 100 INJECTION, SOLUTION INTRAVENOUS; SUBCUTANEOUS at 00:06

## 2020-03-25 RX ADMIN — POTASSIUM CHLORIDE SCH MLS/HR: 2 INJECTION, SOLUTION, CONCENTRATE INTRAVENOUS at 16:00

## 2020-03-25 RX ADMIN — CEFEPIME SCH GM: 2 INJECTION, POWDER, FOR SOLUTION INTRAVENOUS at 21:10

## 2020-03-25 RX ADMIN — HEPARIN SODIUM SCH UNIT: 5000 INJECTION, SOLUTION INTRAVENOUS; SUBCUTANEOUS at 22:45

## 2020-03-25 RX ADMIN — POTASSIUM CHLORIDE SCH MLS/HR: 2 INJECTION, SOLUTION, CONCENTRATE INTRAVENOUS at 08:48

## 2020-03-25 RX ADMIN — PANTOPRAZOLE SODIUM SCH MG: 40 INJECTION, POWDER, FOR SOLUTION INTRAVENOUS at 09:08

## 2020-03-25 RX ADMIN — ALBUMIN (HUMAN) PRN MLS/HR: 12.5 INJECTION, SOLUTION INTRAVENOUS at 13:43

## 2020-03-25 NOTE — PDOC
PULMONARY PROGRESS NOTES


Subjective


Patient intubated on 323 T-max 100.4


she is on 80% FiO2 10 of PEEP


Vitals





Vital Signs








  Date Time  Temp Pulse Resp B/P (MAP) Pulse Ox O2 Delivery O2 Flow Rate FiO2


 


3/25/20 08:00     99 Ventilator  


 


3/25/20 06:00  117 26 86/47 (60)    


 


3/25/20 05:00 101.0       





 101.0       


 


3/25/20 02:50       6.0 








HEENT:  Other (nc at perrl   bipap mask on   neck no lad   no thyromegaly)


Lungs:  Crackles, Other (dull at bases)


Cardiovascular:  S1, S2


Abdomen:  Other (distended,  diffuse pain   no mass)


Extremities:  No Edema


Skin:  Warm


Labs





Laboratory Tests








Test


 3/23/20


09:30 3/23/20


12:32 3/23/20


15:40 3/23/20


17:35


 


O2 Saturation 77 % (92-99)    93 % (92-99) 


 


Arterial Blood pH


 7.24


(7.35-7.45) 


 


 7.46


(7.35-7.45)


 


Arterial Blood pCO2 at


Patient Temp 72 mmHg


(35-46) 


 


 40 mmHg


(35-46)


 


Arterial Blood pO2 at Patient


Temp 45 mmHg


() 


 


 63 mmHg


()


 


Arterial Blood HCO3


 30 mmol/L


(21-28) 


 


 27 mmol/L


(21-28)


 


Arterial Blood Base Excess


 1 mmol/L


(-3-3) 


 


 3 mmol/L


(-3-3)


 


Glucose (Fingerstick)


 


 146 mg/dL


(70-99) 


 





 


Clostridium difficile Toxin B


Gene 


 


 Negative


(Negative) 





 


FiO2    90 +10peep 


 


Test


 3/23/20


18:26 3/23/20


21:17 3/24/20


00:05 3/24/20


05:15


 


Glucose (Fingerstick)


 272 mg/dL


(70-99) 


 222 mg/dL


(70-99) 





 


Sodium Level


 


 139 mmol/L


(136-145) 


 137 mmol/L


(136-145)


 


Potassium Level


 


 3.4 mmol/L


(3.5-5.1) 


 3.3 mmol/L


(3.5-5.1)


 


Chloride Level


 


 101 mmol/L


() 


 101 mmol/L


()


 


Carbon Dioxide Level


 


 32 mmol/L


(21-32) 


 30 mmol/L


(21-32)


 


Anion Gap  6 (6-14)   6 (6-14) 


 


Blood Urea Nitrogen


 


 28 mg/dL


(7-20) 


 32 mg/dL


(7-20)


 


Creatinine


 


 2.6 mg/dL


(0.6-1.0) 


 2.2 mg/dL


(0.6-1.0)


 


Estimated GFR


(Cockcroft-Gault) 


 19.6 


 


 23.7 





 


Glucose Level


 


 198 mg/dL


(70-99) 


 239 mg/dL


(70-99)


 


Calcium Level


 


 7.5 mg/dL


(8.5-10.1) 


 7.5 mg/dL


(8.5-10.1)


 


Phosphorus Level


 


 2.5 mg/dL


(2.6-4.7) 


 2.2 mg/dL


(2.6-4.7)


 


Magnesium Level


 


 2.2 mg/dL


(1.8-2.4) 


 2.2 mg/dL


(1.8-2.4)


 


White Blood Count


 


 


 


 20.5 x10^3/uL


(4.0-11.0)


 


Red Blood Count


 


 


 


 2.52 x10^6/uL


(3.50-5.40)


 


Hemoglobin


 


 


 


 8.3 g/dL


(12.0-15.5)


 


Hematocrit


 


 


 


 24.4 %


(36.0-47.0)


 


Mean Corpuscular Volume    97 fL () 


 


Mean Corpuscular Hemoglobin    33 pg (25-35) 


 


Mean Corpuscular Hemoglobin


Concent 


 


 


 34 g/dL


(31-37)


 


Red Cell Distribution Width


 


 


 


 13.1 %


(11.5-14.5)


 


Platelet Count


 


 


 


 242 x10^3/uL


(140-400)


 


Neutrophils (%) (Auto)    93 % (31-73) 


 


Lymphocytes (%) (Auto)    2 % (24-48) 


 


Monocytes (%) (Auto)    3 % (0-9) 


 


Eosinophils (%) (Auto)    2 % (0-3) 


 


Basophils (%) (Auto)    0 % (0-3) 


 


Neutrophils # (Auto)


 


 


 


 19.1 x10^3/uL


(1.8-7.7)


 


Lymphocytes # (Auto)


 


 


 


 0.4 x10^3/uL


(1.0-4.8)


 


Monocytes # (Auto)


 


 


 


 0.6 x10^3/uL


(0.0-1.1)


 


Eosinophils # (Auto)


 


 


 


 0.4 x10^3/uL


(0.0-0.7)


 


Basophils # (Auto)


 


 


 


 0.1 x10^3/uL


(0.0-0.2)


 


Thyroid Stimulating Hormone


(TSH) 


 


 


 1.091 uIU/mL


(0.358-3.74)


 


Test


 3/24/20


05:49 3/24/20


08:05 3/24/20


12:53 3/24/20


16:30


 


Glucose (Fingerstick)


 217 mg/dL


(70-99) 


 225 mg/dL


(70-99) 





 


O2 Saturation  99 % (92-99)   


 


Arterial Blood pH


 


 7.47


(7.35-7.45) 


 





 


Arterial Blood pCO2 at


Patient Temp 


 34 mmHg


(35-46) 


 





 


Arterial Blood pO2 at Patient


Temp 


 161 mmHg


() 


 





 


Arterial Blood HCO3


 


 24 mmol/L


(21-28) 


 





 


Arterial Blood Base Excess


 


 1 mmol/L


(-3-3) 


 





 


FiO2  90   


 


Sodium Level


 


 


 


 139 mmol/L


(136-145)


 


Potassium Level


 


 


 


 3.7 mmol/L


(3.5-5.1)


 


Chloride Level


 


 


 


 103 mmol/L


()


 


Carbon Dioxide Level


 


 


 


 29 mmol/L


(21-32)


 


Anion Gap    7 (6-14) 


 


Blood Urea Nitrogen


 


 


 


 36 mg/dL


(7-20)


 


Creatinine


 


 


 


 2.1 mg/dL


(0.6-1.0)


 


Estimated GFR


(Cockcroft-Gault) 


 


 


 25.0 





 


Glucose Level


 


 


 


 280 mg/dL


(70-99)


 


Calcium Level


 


 


 


 7.2 mg/dL


(8.5-10.1)


 


Phosphorus Level


 


 


 


 2.5 mg/dL


(2.6-4.7)


 


Magnesium Level


 


 


 


 2.3 mg/dL


(1.8-2.4)


 


Test


 3/25/20


00:02 3/25/20


00:55 3/25/20


06:00 3/25/20


08:00


 


Glucose (Fingerstick)


 274 mg/dL


(70-99) 


 


 





 


Sodium Level


 


 139 mmol/L


(136-145) 137 mmol/L


(136-145) 





 


Potassium Level


 


 3.7 mmol/L


(3.5-5.1) 3.6 mmol/L


(3.5-5.1) 





 


Chloride Level


 


 103 mmol/L


() 103 mmol/L


() 





 


Carbon Dioxide Level


 


 23 mmol/L


(21-32) 26 mmol/L


(21-32) 





 


Anion Gap  13 (6-14)  8 (6-14)  


 


Blood Urea Nitrogen


 


 42 mg/dL


(7-20) 50 mg/dL


(7-20) 





 


Creatinine


 


 2.2 mg/dL


(0.6-1.0) 2.7 mg/dL


(0.6-1.0) 





 


Estimated GFR


(Cockcroft-Gault) 


 23.7 


 18.7 


 





 


Glucose Level


 


 274 mg/dL


(70-99) 270 mg/dL


(70-99) 





 


Calcium Level


 


 7.7 mg/dL


(8.5-10.1) 7.5 mg/dL


(8.5-10.1) 





 


Phosphorus Level


 


 2.4 mg/dL


(2.6-4.7) 2.3 mg/dL


(2.6-4.7) 





 


Magnesium Level


 


 2.3 mg/dL


(1.8-2.4) 2.3 mg/dL


(1.8-2.4) 





 


White Blood Count


 


 


 26.3 x10^3/uL


(4.0-11.0) 





 


Red Blood Count


 


 


 2.41 x10^6/uL


(3.50-5.40) 





 


Hemoglobin


 


 


 7.8 g/dL


(12.0-15.5) 





 


Hematocrit


 


 


 23.6 %


(36.0-47.0) 





 


Mean Corpuscular Volume   98 fL ()  


 


Mean Corpuscular Hemoglobin   33 pg (25-35)  


 


Mean Corpuscular Hemoglobin


Concent 


 


 33 g/dL


(31-37) 





 


Red Cell Distribution Width


 


 


 13.4 %


(11.5-14.5) 





 


Platelet Count


 


 


 292 x10^3/uL


(140-400) 





 


Neutrophils (%) (Auto)   90 % (31-73)  


 


Lymphocytes (%) (Auto)   4 % (24-48)  


 


Monocytes (%) (Auto)   5 % (0-9)  


 


Eosinophils (%) (Auto)   1 % (0-3)  


 


Basophils (%) (Auto)   0 % (0-3)  


 


Neutrophils # (Auto)


 


 


 23.8 x10^3/uL


(1.8-7.7) 





 


Lymphocytes # (Auto)


 


 


 1.0 x10^3/uL


(1.0-4.8) 





 


Monocytes # (Auto)


 


 


 1.2 x10^3/uL


(0.0-1.1) 





 


Eosinophils # (Auto)


 


 


 0.3 x10^3/uL


(0.0-0.7) 





 


Basophils # (Auto)


 


 


 0.0 x10^3/uL


(0.0-0.2) 





 


BUN/Creatinine Ratio   19 (6-20)  


 


Total Bilirubin


 


 


 0.4 mg/dL


(0.2-1.0) 





 


Aspartate Amino Transf


(AST/SGOT) 


 


 27 U/L (15-37) 


 





 


Alanine Aminotransferase


(ALT/SGPT) 


 


 12 U/L (14-59) 


 





 


Alkaline Phosphatase


 


 


 82 U/L


() 





 


Total Protein


 


 


 4.3 g/dL


(6.4-8.2) 





 


Albumin


 


 


 1.4 g/dL


(3.4-5.0) 





 


Albumin/Globulin Ratio   0.5 (1.0-1.7)  


 


Lactic Acid Level


 


 


 


 1.4 mmol/L


(0.4-2.0)








Laboratory Tests








Test


 3/24/20


12:53 3/24/20


16:30 3/25/20


00:02 3/25/20


00:55


 


Glucose (Fingerstick)


 225 mg/dL


(70-99) 


 274 mg/dL


(70-99) 





 


Sodium Level


 


 139 mmol/L


(136-145) 


 139 mmol/L


(136-145)


 


Potassium Level


 


 3.7 mmol/L


(3.5-5.1) 


 3.7 mmol/L


(3.5-5.1)


 


Chloride Level


 


 103 mmol/L


() 


 103 mmol/L


()


 


Carbon Dioxide Level


 


 29 mmol/L


(21-32) 


 23 mmol/L


(21-32)


 


Anion Gap  7 (6-14)   13 (6-14) 


 


Blood Urea Nitrogen


 


 36 mg/dL


(7-20) 


 42 mg/dL


(7-20)


 


Creatinine


 


 2.1 mg/dL


(0.6-1.0) 


 2.2 mg/dL


(0.6-1.0)


 


Estimated GFR


(Cockcroft-Gault) 


 25.0 


 


 23.7 





 


Glucose Level


 


 280 mg/dL


(70-99) 


 274 mg/dL


(70-99)


 


Calcium Level


 


 7.2 mg/dL


(8.5-10.1) 


 7.7 mg/dL


(8.5-10.1)


 


Phosphorus Level


 


 2.5 mg/dL


(2.6-4.7) 


 2.4 mg/dL


(2.6-4.7)


 


Magnesium Level


 


 2.3 mg/dL


(1.8-2.4) 


 2.3 mg/dL


(1.8-2.4)


 


Test


 3/25/20


06:00 3/25/20


08:00 


 





 


White Blood Count


 26.3 x10^3/uL


(4.0-11.0) 


 


 





 


Red Blood Count


 2.41 x10^6/uL


(3.50-5.40) 


 


 





 


Hemoglobin


 7.8 g/dL


(12.0-15.5) 


 


 





 


Hematocrit


 23.6 %


(36.0-47.0) 


 


 





 


Mean Corpuscular Volume 98 fL ()    


 


Mean Corpuscular Hemoglobin 33 pg (25-35)    


 


Mean Corpuscular Hemoglobin


Concent 33 g/dL


(31-37) 


 


 





 


Red Cell Distribution Width


 13.4 %


(11.5-14.5) 


 


 





 


Platelet Count


 292 x10^3/uL


(140-400) 


 


 





 


Neutrophils (%) (Auto) 90 % (31-73)    


 


Lymphocytes (%) (Auto) 4 % (24-48)    


 


Monocytes (%) (Auto) 5 % (0-9)    


 


Eosinophils (%) (Auto) 1 % (0-3)    


 


Basophils (%) (Auto) 0 % (0-3)    


 


Neutrophils # (Auto)


 23.8 x10^3/uL


(1.8-7.7) 


 


 





 


Lymphocytes # (Auto)


 1.0 x10^3/uL


(1.0-4.8) 


 


 





 


Monocytes # (Auto)


 1.2 x10^3/uL


(0.0-1.1) 


 


 





 


Eosinophils # (Auto)


 0.3 x10^3/uL


(0.0-0.7) 


 


 





 


Basophils # (Auto)


 0.0 x10^3/uL


(0.0-0.2) 


 


 





 


Sodium Level


 137 mmol/L


(136-145) 


 


 





 


Potassium Level


 3.6 mmol/L


(3.5-5.1) 


 


 





 


Chloride Level


 103 mmol/L


() 


 


 





 


Carbon Dioxide Level


 26 mmol/L


(21-32) 


 


 





 


Anion Gap 8 (6-14)    


 


Blood Urea Nitrogen


 50 mg/dL


(7-20) 


 


 





 


Creatinine


 2.7 mg/dL


(0.6-1.0) 


 


 





 


Estimated GFR


(Cockcroft-Gault) 18.7 


 


 


 





 


BUN/Creatinine Ratio 19 (6-20)    


 


Glucose Level


 270 mg/dL


(70-99) 


 


 





 


Calcium Level


 7.5 mg/dL


(8.5-10.1) 


 


 





 


Phosphorus Level


 2.3 mg/dL


(2.6-4.7) 


 


 





 


Magnesium Level


 2.3 mg/dL


(1.8-2.4) 


 


 





 


Total Bilirubin


 0.4 mg/dL


(0.2-1.0) 


 


 





 


Aspartate Amino Transf


(AST/SGOT) 27 U/L (15-37) 


 


 


 





 


Alanine Aminotransferase


(ALT/SGPT) 12 U/L (14-59) 


 


 


 





 


Alkaline Phosphatase


 82 U/L


() 


 


 





 


Total Protein


 4.3 g/dL


(6.4-8.2) 


 


 





 


Albumin


 1.4 g/dL


(3.4-5.0) 


 


 





 


Albumin/Globulin Ratio 0.5 (1.0-1.7)    


 


Lactic Acid Level


 


 1.4 mmol/L


(0.4-2.0) 


 











Medications





Active Scripts








 Medications  Dose


 Route/Sig


 Max Daily Dose Days Date Category


 


 Bisoprolol


 Fumarate 5 Mg


 Tablet  10 Mg


 PO DAILY


   3/16/20 Reported











Impression


.


IMPRESSION:


1.  Acute hypoxemic respiratory failure secondary to acute pancreatitis, sepsis,

abdominal distention, and pneumonia.


2.  Gallstone pancreatitis. WITH NECROSIS


3.  Severe metabolic acidosis.


4.  Acute kidney injury. ON HD


5.  Acute gallstone pancreatitis.


6.  Hypoalbuminemia.


7.  Hypocalcemia.


8.  Leukocytosis


9.  Chronic anemia





Plan


.


ABG noted


Replace potassium


Patient currently on C RRT


Repeat CT once stable


Antibiotics per ID


Follow surgery input


Follow nephrology input


Nutritional support with TPN


Prognosis is











STEVE MIRANDA MD              Mar 25, 2020 08:54

## 2020-03-25 NOTE — PDOC
SURGICAL PROGRESS NOTE


Subjective


Pt appears comfortable, oxygen requirements done


Vital Signs





Vital Signs








  Date Time  Temp Pulse Resp B/P (MAP) Pulse Ox O2 Delivery O2 Flow Rate FiO2


 


3/25/20 11:00  116 25 127/57 (80) 99 Ventilator  


 


3/25/20 10:11       6.0 


 


3/25/20 08:00 102.2       





 102.2       








I&O











Intake and Output 


 


 3/25/20





 06:59


 


Intake Total 3088.0 ml


 


Output Total 494 ml


 


Balance 2594.0 ml


 


 


 


IV Total 2957.0 ml


 


Other 131 ml


 


Output Urine Total 194 ml


 


Stool Total 200 ml


 


Gastric Drainage Total 100 ml








PATIENT HAS A VILLASENOR:  Yes (accurate i and os)


General:  No acute distress


Abdomen:  Soft, No tenderness, Other (some distention)


Labs





Laboratory Tests








Test


 3/23/20


12:32 3/23/20


15:40 3/23/20


17:35 3/23/20


18:26


 


Glucose (Fingerstick)


 146 mg/dL


(70-99) 


 


 272 mg/dL


(70-99)


 


Clostridium difficile Toxin B


Gene 


 Negative


(Negative) 


 





 


O2 Saturation   93 % (92-99)  


 


Arterial Blood pH


 


 


 7.46


(7.35-7.45) 





 


Arterial Blood pCO2 at


Patient Temp 


 


 40 mmHg


(35-46) 





 


Arterial Blood pO2 at Patient


Temp 


 


 63 mmHg


() 





 


Arterial Blood HCO3


 


 


 27 mmol/L


(21-28) 





 


Arterial Blood Base Excess


 


 


 3 mmol/L


(-3-3) 





 


FiO2   90 +10peep  


 


Test


 3/23/20


21:17 3/24/20


00:05 3/24/20


05:15 3/24/20


05:49


 


Sodium Level


 139 mmol/L


(136-145) 


 137 mmol/L


(136-145) 





 


Potassium Level


 3.4 mmol/L


(3.5-5.1) 


 3.3 mmol/L


(3.5-5.1) 





 


Chloride Level


 101 mmol/L


() 


 101 mmol/L


() 





 


Carbon Dioxide Level


 32 mmol/L


(21-32) 


 30 mmol/L


(21-32) 





 


Anion Gap 6 (6-14)   6 (6-14)  


 


Blood Urea Nitrogen


 28 mg/dL


(7-20) 


 32 mg/dL


(7-20) 





 


Creatinine


 2.6 mg/dL


(0.6-1.0) 


 2.2 mg/dL


(0.6-1.0) 





 


Estimated GFR


(Cockcroft-Gault) 19.6 


 


 23.7 


 





 


Glucose Level


 198 mg/dL


(70-99) 


 239 mg/dL


(70-99) 





 


Calcium Level


 7.5 mg/dL


(8.5-10.1) 


 7.5 mg/dL


(8.5-10.1) 





 


Phosphorus Level


 2.5 mg/dL


(2.6-4.7) 


 2.2 mg/dL


(2.6-4.7) 





 


Magnesium Level


 2.2 mg/dL


(1.8-2.4) 


 2.2 mg/dL


(1.8-2.4) 





 


Glucose (Fingerstick)


 


 222 mg/dL


(70-99) 


 217 mg/dL


(70-99)


 


White Blood Count


 


 


 20.5 x10^3/uL


(4.0-11.0) 





 


Red Blood Count


 


 


 2.52 x10^6/uL


(3.50-5.40) 





 


Hemoglobin


 


 


 8.3 g/dL


(12.0-15.5) 





 


Hematocrit


 


 


 24.4 %


(36.0-47.0) 





 


Mean Corpuscular Volume   97 fL ()  


 


Mean Corpuscular Hemoglobin   33 pg (25-35)  


 


Mean Corpuscular Hemoglobin


Concent 


 


 34 g/dL


(31-37) 





 


Red Cell Distribution Width


 


 


 13.1 %


(11.5-14.5) 





 


Platelet Count


 


 


 242 x10^3/uL


(140-400) 





 


Neutrophils (%) (Auto)   93 % (31-73)  


 


Lymphocytes (%) (Auto)   2 % (24-48)  


 


Monocytes (%) (Auto)   3 % (0-9)  


 


Eosinophils (%) (Auto)   2 % (0-3)  


 


Basophils (%) (Auto)   0 % (0-3)  


 


Neutrophils # (Auto)


 


 


 19.1 x10^3/uL


(1.8-7.7) 





 


Lymphocytes # (Auto)


 


 


 0.4 x10^3/uL


(1.0-4.8) 





 


Monocytes # (Auto)


 


 


 0.6 x10^3/uL


(0.0-1.1) 





 


Eosinophils # (Auto)


 


 


 0.4 x10^3/uL


(0.0-0.7) 





 


Basophils # (Auto)


 


 


 0.1 x10^3/uL


(0.0-0.2) 





 


Thyroid Stimulating Hormone


(TSH) 


 


 1.091 uIU/mL


(0.358-3.74) 





 


Test


 3/24/20


08:05 3/24/20


12:53 3/24/20


16:30 3/25/20


00:02


 


O2 Saturation 99 % (92-99)    


 


Arterial Blood pH


 7.47


(7.35-7.45) 


 


 





 


Arterial Blood pCO2 at


Patient Temp 34 mmHg


(35-46) 


 


 





 


Arterial Blood pO2 at Patient


Temp 161 mmHg


() 


 


 





 


Arterial Blood HCO3


 24 mmol/L


(21-28) 


 


 





 


Arterial Blood Base Excess


 1 mmol/L


(-3-3) 


 


 





 


FiO2 90    


 


Glucose (Fingerstick)


 


 225 mg/dL


(70-99) 


 274 mg/dL


(70-99)


 


Sodium Level


 


 


 139 mmol/L


(136-145) 





 


Potassium Level


 


 


 3.7 mmol/L


(3.5-5.1) 





 


Chloride Level


 


 


 103 mmol/L


() 





 


Carbon Dioxide Level


 


 


 29 mmol/L


(21-32) 





 


Anion Gap   7 (6-14)  


 


Blood Urea Nitrogen


 


 


 36 mg/dL


(7-20) 





 


Creatinine


 


 


 2.1 mg/dL


(0.6-1.0) 





 


Estimated GFR


(Cockcroft-Gault) 


 


 25.0 


 





 


Glucose Level


 


 


 280 mg/dL


(70-99) 





 


Calcium Level


 


 


 7.2 mg/dL


(8.5-10.1) 





 


Phosphorus Level


 


 


 2.5 mg/dL


(2.6-4.7) 





 


Magnesium Level


 


 


 2.3 mg/dL


(1.8-2.4) 





 


Test


 3/25/20


00:55 3/25/20


06:00 3/25/20


08:00 





 


Sodium Level


 139 mmol/L


(136-145) 137 mmol/L


(136-145) 


 





 


Potassium Level


 3.7 mmol/L


(3.5-5.1) 3.6 mmol/L


(3.5-5.1) 


 





 


Chloride Level


 103 mmol/L


() 103 mmol/L


() 


 





 


Carbon Dioxide Level


 23 mmol/L


(21-32) 26 mmol/L


(21-32) 


 





 


Anion Gap 13 (6-14)  8 (6-14)   


 


Blood Urea Nitrogen


 42 mg/dL


(7-20) 50 mg/dL


(7-20) 


 





 


Creatinine


 2.2 mg/dL


(0.6-1.0) 2.7 mg/dL


(0.6-1.0) 


 





 


Estimated GFR


(Cockcroft-Gault) 23.7 


 18.7 


 


 





 


Glucose Level


 274 mg/dL


(70-99) 270 mg/dL


(70-99) 


 





 


Calcium Level


 7.7 mg/dL


(8.5-10.1) 7.5 mg/dL


(8.5-10.1) 


 





 


Phosphorus Level


 2.4 mg/dL


(2.6-4.7) 2.3 mg/dL


(2.6-4.7) 


 





 


Magnesium Level


 2.3 mg/dL


(1.8-2.4) 2.3 mg/dL


(1.8-2.4) 


 





 


White Blood Count


 


 26.3 x10^3/uL


(4.0-11.0) 


 





 


Red Blood Count


 


 2.41 x10^6/uL


(3.50-5.40) 


 





 


Hemoglobin


 


 7.8 g/dL


(12.0-15.5) 


 





 


Hematocrit


 


 23.6 %


(36.0-47.0) 


 





 


Mean Corpuscular Volume  98 fL ()   


 


Mean Corpuscular Hemoglobin  33 pg (25-35)   


 


Mean Corpuscular Hemoglobin


Concent 


 33 g/dL


(31-37) 


 





 


Red Cell Distribution Width


 


 13.4 %


(11.5-14.5) 


 





 


Platelet Count


 


 292 x10^3/uL


(140-400) 


 





 


Neutrophils (%) (Auto)  90 % (31-73)   


 


Lymphocytes (%) (Auto)  4 % (24-48)   


 


Monocytes (%) (Auto)  5 % (0-9)   


 


Eosinophils (%) (Auto)  1 % (0-3)   


 


Basophils (%) (Auto)  0 % (0-3)   


 


Neutrophils # (Auto)


 


 23.8 x10^3/uL


(1.8-7.7) 


 





 


Lymphocytes # (Auto)


 


 1.0 x10^3/uL


(1.0-4.8) 


 





 


Monocytes # (Auto)


 


 1.2 x10^3/uL


(0.0-1.1) 


 





 


Eosinophils # (Auto)


 


 0.3 x10^3/uL


(0.0-0.7) 


 





 


Basophils # (Auto)


 


 0.0 x10^3/uL


(0.0-0.2) 


 





 


Segmented Neutrophils %  73 % (35-66)   


 


Band Neutrophils %  19 % (0-9)   


 


Lymphocytes %  3 % (24-48)   


 


Monocytes %  1 % (0-10)   


 


Metamyelocytes %  3 % (0-0)   


 


Myelocytes %  1 % (0-0)   


 


Toxic Granulation  Slight   


 


Dohle Bodies  Few   


 


Platelet Estimate


 


 Adequate


(ADEQUATE) 


 





 


BUN/Creatinine Ratio  19 (6-20)   


 


Total Bilirubin


 


 0.4 mg/dL


(0.2-1.0) 


 





 


Aspartate Amino Transf


(AST/SGOT) 


 27 U/L (15-37) 


 


 





 


Alanine Aminotransferase


(ALT/SGPT) 


 12 U/L (14-59) 


 


 





 


Alkaline Phosphatase


 


 82 U/L


() 


 





 


Total Protein


 


 4.3 g/dL


(6.4-8.2) 


 





 


Albumin


 


 1.4 g/dL


(3.4-5.0) 


 





 


Albumin/Globulin Ratio  0.5 (1.0-1.7)   


 


Lactic Acid Level


 


 


 1.4 mmol/L


(0.4-2.0) 











Laboratory Tests








Test


 3/24/20


12:53 3/24/20


16:30 3/25/20


00:02 3/25/20


00:55


 


Glucose (Fingerstick)


 225 mg/dL


(70-99) 


 274 mg/dL


(70-99) 





 


Sodium Level


 


 139 mmol/L


(136-145) 


 139 mmol/L


(136-145)


 


Potassium Level


 


 3.7 mmol/L


(3.5-5.1) 


 3.7 mmol/L


(3.5-5.1)


 


Chloride Level


 


 103 mmol/L


() 


 103 mmol/L


()


 


Carbon Dioxide Level


 


 29 mmol/L


(21-32) 


 23 mmol/L


(21-32)


 


Anion Gap  7 (6-14)   13 (6-14) 


 


Blood Urea Nitrogen


 


 36 mg/dL


(7-20) 


 42 mg/dL


(7-20)


 


Creatinine


 


 2.1 mg/dL


(0.6-1.0) 


 2.2 mg/dL


(0.6-1.0)


 


Estimated GFR


(Cockcroft-Gault) 


 25.0 


 


 23.7 





 


Glucose Level


 


 280 mg/dL


(70-99) 


 274 mg/dL


(70-99)


 


Calcium Level


 


 7.2 mg/dL


(8.5-10.1) 


 7.7 mg/dL


(8.5-10.1)


 


Phosphorus Level


 


 2.5 mg/dL


(2.6-4.7) 


 2.4 mg/dL


(2.6-4.7)


 


Magnesium Level


 


 2.3 mg/dL


(1.8-2.4) 


 2.3 mg/dL


(1.8-2.4)


 


Test


 3/25/20


06:00 3/25/20


08:00 


 





 


White Blood Count


 26.3 x10^3/uL


(4.0-11.0) 


 


 





 


Red Blood Count


 2.41 x10^6/uL


(3.50-5.40) 


 


 





 


Hemoglobin


 7.8 g/dL


(12.0-15.5) 


 


 





 


Hematocrit


 23.6 %


(36.0-47.0) 


 


 





 


Mean Corpuscular Volume 98 fL ()    


 


Mean Corpuscular Hemoglobin 33 pg (25-35)    


 


Mean Corpuscular Hemoglobin


Concent 33 g/dL


(31-37) 


 


 





 


Red Cell Distribution Width


 13.4 %


(11.5-14.5) 


 


 





 


Platelet Count


 292 x10^3/uL


(140-400) 


 


 





 


Neutrophils (%) (Auto) 90 % (31-73)    


 


Lymphocytes (%) (Auto) 4 % (24-48)    


 


Monocytes (%) (Auto) 5 % (0-9)    


 


Eosinophils (%) (Auto) 1 % (0-3)    


 


Basophils (%) (Auto) 0 % (0-3)    


 


Neutrophils # (Auto)


 23.8 x10^3/uL


(1.8-7.7) 


 


 





 


Lymphocytes # (Auto)


 1.0 x10^3/uL


(1.0-4.8) 


 


 





 


Monocytes # (Auto)


 1.2 x10^3/uL


(0.0-1.1) 


 


 





 


Eosinophils # (Auto)


 0.3 x10^3/uL


(0.0-0.7) 


 


 





 


Basophils # (Auto)


 0.0 x10^3/uL


(0.0-0.2) 


 


 





 


Segmented Neutrophils % 73 % (35-66)    


 


Band Neutrophils % 19 % (0-9)    


 


Lymphocytes % 3 % (24-48)    


 


Monocytes % 1 % (0-10)    


 


Metamyelocytes % 3 % (0-0)    


 


Myelocytes % 1 % (0-0)    


 


Toxic Granulation Slight    


 


Dohle Bodies Few    


 


Platelet Estimate


 Adequate


(ADEQUATE) 


 


 





 


Sodium Level


 137 mmol/L


(136-145) 


 


 





 


Potassium Level


 3.6 mmol/L


(3.5-5.1) 


 


 





 


Chloride Level


 103 mmol/L


() 


 


 





 


Carbon Dioxide Level


 26 mmol/L


(21-32) 


 


 





 


Anion Gap 8 (6-14)    


 


Blood Urea Nitrogen


 50 mg/dL


(7-20) 


 


 





 


Creatinine


 2.7 mg/dL


(0.6-1.0) 


 


 





 


Estimated GFR


(Cockcroft-Gault) 18.7 


 


 


 





 


BUN/Creatinine Ratio 19 (6-20)    


 


Glucose Level


 270 mg/dL


(70-99) 


 


 





 


Calcium Level


 7.5 mg/dL


(8.5-10.1) 


 


 





 


Phosphorus Level


 2.3 mg/dL


(2.6-4.7) 


 


 





 


Magnesium Level


 2.3 mg/dL


(1.8-2.4) 


 


 





 


Total Bilirubin


 0.4 mg/dL


(0.2-1.0) 


 


 





 


Aspartate Amino Transf


(AST/SGOT) 27 U/L (15-37) 


 


 


 





 


Alanine Aminotransferase


(ALT/SGPT) 12 U/L (14-59) 


 


 


 





 


Alkaline Phosphatase


 82 U/L


() 


 


 





 


Total Protein


 4.3 g/dL


(6.4-8.2) 


 


 





 


Albumin


 1.4 g/dL


(3.4-5.0) 


 


 





 


Albumin/Globulin Ratio 0.5 (1.0-1.7)    


 


Lactic Acid Level


 


 1.4 mmol/L


(0.4-2.0) 


 











Problem List


Problems


Medical Problems:


(1) Acute pancreatitis


Status: Acute  





(2) Cholelithiasis


Status: Acute  








Assessment/Plan


severe pancreatitis


cont supportive care


no surgical plans currently, given significant increase morbidity and mortality 

with early surgery.











GAMAL ZHOU MD             Mar 25, 2020 11:29

## 2020-03-25 NOTE — PDOC
Objective:


Objective:


D/w nurse.


Vital Signs:





                                   Vital Signs








  Date Time  Temp Pulse Resp B/P (MAP) Pulse Ox O2 Delivery O2 Flow Rate FiO2


 


3/25/20 09:40     99 Ventilator  


 


3/25/20 06:00  117 26 86/47 (60)    


 


3/25/20 05:00 101.0       





 101.0       


 


3/25/20 02:50       6.0 








Labs:





Laboratory Tests








Test


 3/24/20


12:53 3/24/20


16:30 3/25/20


00:02 3/25/20


00:55


 


Glucose (Fingerstick) 225 mg/dL   274 mg/dL  


 


Sodium Level  139 mmol/L   139 mmol/L 


 


Potassium Level  3.7 mmol/L   3.7 mmol/L 


 


Chloride Level  103 mmol/L   103 mmol/L 


 


Carbon Dioxide Level  29 mmol/L   23 mmol/L 


 


Anion Gap  7   13 


 


Blood Urea Nitrogen  36 mg/dL   42 mg/dL 


 


Creatinine  2.1 mg/dL   2.2 mg/dL 


 


Estimated GFR


(Cockcroft-Gault) 


 25.0 


 


 23.7 





 


Glucose Level  280 mg/dL   274 mg/dL 


 


Calcium Level  7.2 mg/dL   7.7 mg/dL 


 


Phosphorus Level  2.5 mg/dL   2.4 mg/dL 


 


Magnesium Level  2.3 mg/dL   2.3 mg/dL 


 


Test


 3/25/20


06:00 3/25/20


08:00 


 





 


White Blood Count 26.3 x10^3/uL    


 


Red Blood Count 2.41 x10^6/uL    


 


Hemoglobin 7.8 g/dL    


 


Hematocrit 23.6 %    


 


Mean Corpuscular Volume 98 fL    


 


Mean Corpuscular Hemoglobin 33 pg    


 


Mean Corpuscular Hemoglobin


Concent 33 g/dL 


 


 


 





 


Red Cell Distribution Width 13.4 %    


 


Platelet Count 292 x10^3/uL    


 


Neutrophils (%) (Auto) 90 %    


 


Lymphocytes (%) (Auto) 4 %    


 


Monocytes (%) (Auto) 5 %    


 


Eosinophils (%) (Auto) 1 %    


 


Basophils (%) (Auto) 0 %    


 


Neutrophils # (Auto) 23.8 x10^3/uL    


 


Lymphocytes # (Auto) 1.0 x10^3/uL    


 


Monocytes # (Auto) 1.2 x10^3/uL    


 


Eosinophils # (Auto) 0.3 x10^3/uL    


 


Basophils # (Auto) 0.0 x10^3/uL    


 


Segmented Neutrophils % 73 %    


 


Band Neutrophils % 19 %    


 


Lymphocytes % 3 %    


 


Monocytes % 1 %    


 


Metamyelocytes % 3 %    


 


Myelocytes % 1 %    


 


Toxic Granulation Slight    


 


Dohle Bodies Few    


 


Platelet Estimate Adequate    


 


Sodium Level 137 mmol/L    


 


Potassium Level 3.6 mmol/L    


 


Chloride Level 103 mmol/L    


 


Carbon Dioxide Level 26 mmol/L    


 


Anion Gap 8    


 


Blood Urea Nitrogen 50 mg/dL    


 


Creatinine 2.7 mg/dL    


 


Estimated GFR


(Cockcroft-Gault) 18.7 


 


 


 





 


BUN/Creatinine Ratio 19    


 


Glucose Level 270 mg/dL    


 


Calcium Level 7.5 mg/dL    


 


Phosphorus Level 2.3 mg/dL    


 


Magnesium Level 2.3 mg/dL    


 


Total Bilirubin 0.4 mg/dL    


 


Aspartate Amino Transf


(AST/SGOT) 27 U/L 


 


 


 





 


Alanine Aminotransferase


(ALT/SGPT) 12 U/L 


 


 


 





 


Alkaline Phosphatase 82 U/L    


 


Total Protein 4.3 g/dL    


 


Albumin 1.4 g/dL    


 


Albumin/Globulin Ratio 0.5    


 


Lactic Acid Level  1.4 mmol/L   








Imaging:


CXR 3/25


IMPRESSION:


1. Lines and tubes as described.


2. Moderate congestive changes with small left pleural effusion unchanged.





PE:





GEN: intubated on CRRT, RT and dialysis RN present


LUNGS: vent, diminished


HEART: tachycardic


ABD: stable firmness, OG dark bilious, rectal tube dark liquid, ?flinched w/ 

palpation


NEURO/PSYCH: sedated





A/P:


Gallstone pancreatitis, fever, MOSF





--


Continue support, will review w/ Dr. Bhatia.





Hemodynamically unstable?:  Yes


Is patient in severe pain?:  Yes


Is NPO status required?:  Yes











RAGHAVENDRA MURCIA         Mar 25, 2020 10:13

## 2020-03-25 NOTE — PDOC
SUBJECTIVE


ROS


Intubated this am, On pressors





OBJECTIVE


Vital Signs





Vital Signs








  Date Time  Temp Pulse Resp B/P (MAP) Pulse Ox O2 Delivery O2 Flow Rate FiO2


 


3/25/20 09:40     99 Ventilator  


 


3/25/20 06:00  117 26 86/47 (60)    


 


3/25/20 05:00 101.0       





 101.0       


 


3/25/20 02:50       6.0 








I & 0











Intake and Output 


 


 3/25/20





 07:00


 


Intake Total 3088.0 ml


 


Output Total 494 ml


 


Balance 2594.0 ml


 


 


 


IV Total 2957.0 ml


 


Other 131 ml


 


Output Urine Total 194 ml


 


Stool Total 200 ml


 


Gastric Drainage Total 100 ml











PHYSICAL EXAM


Physical Exam


GENERAL: Intubated on MV 


HEENT: Mild icterus, Intubated  


NECK:  Supple. 


LUNGS:  Decreased breath sounds at the bases.


HEART:  S1, S2, tachycardia, 110s,


ABDOMEN:  Distended, + BS. Rectal tube in place


: [place]  +


EXTREMITIES: Trace edema, no cyanosis - mottled.


DERMATOLOGIC:  Warm and dry.  No generalized rash.  


CENTRAL NERVOUS SYSTEM: Intubated  on MV 


RUE-PICC, RIJ/Temp HDC & LIJ





DIAGNOSIS/ASSESSMENT


Assessment & Plan


JED - ATN, Anuric, requiring HD 


 intubated on 3/23 , currently on  pressors


initially on HD , Switched to CRRT 3/23 seen on CRRT, tolerating well (Temp HDC 

replaced today 2/2 nonfunctional ) 


Discussed with RN and Robert  





HyperKalemia-  in the past, Currently Mild hypokalemia - replace as needed 





 Acidosis -  Bicarb Normal- on higher side ,  Dced  IV bicarb  on 3/23


Currently on  TPN with bicarb  





HypoCalcemia- Corrected Ca normal  





Hypoalbuminemia- severe  





HypOPhos -Replace Kphos  IV , liban CALL 





 Acute pancreatitis - with necrosis/infection, likely gallstone pancreatitis. 

GI, ID, and general surgery  following  





Calculus of gallbladder with acute cholecystitis without obstruction - with 

secondary pancreatitis. Lap naif is indicated, will need to await pancreatitis 

resolution





HTN - Hypotensive currently and on pressors  





Sepsis - with acute pancreatitis as end organ dysfunction, sign of infection, 

zyvox, merrem





Hypoxia with respiratory failure - intubated





COMMENT/RELEVANT DATA


Meds





Current Medications








 Medications


  (Trade)  Dose


 Ordered  Sig/Yee  Start Time


 Stop Time Status Last Admin


Dose Admin


 


 Acetaminophen


  (Tylenol Supp)  650 mg  PRN Q6HRS  PRN  3/24/20 10:30


    3/24/20 10:37


650 MG


 


 Acetaminophen


  (Tylenol)  650 mg  PRN Q6HRS  PRN  3/21/20 03:36


    3/21/20 16:36


650 MG


 


 Albumin Human  200 ml @ 


 200 mls/hr  1X  ONCE  3/23/20 07:30


 3/23/20 08:29 DC 3/23/20 08:51


200 MLS/HR


 


 Albuterol Sulfate


  (Ventolin Neb


 Soln)  2.5 mg  1X  ONCE  3/17/20 22:30


 3/17/20 22:31 DC 3/18/20 00:56


2.5 MG


 


 Artificial Tears


  (Artificial


 Tears)  1 drop  PRN Q1HR  PRN  3/23/20 08:15


     





 


 Atenolol


  (Tenormin)  100 mg  DAILY  3/17/20 09:00


 3/16/20 20:08 DC  





 


 Benzocaine


  (Hurricaine One)  1 spray  1X  ONCE  3/20/20 14:30


 3/20/20 14:31 DC 3/20/20 16:38


1 SPRAY


 


 Calcium Carbonate/


 Glycine


  (Tums)  500 mg  PRN AFTMEALHC  PRN  3/18/20 17:45


     





 


 Calcium Chloride


 1000 mg/Sodium


 Chloride  110 ml @ 


 220 mls/hr  1X  ONCE  3/17/20 22:30


 3/17/20 22:59 DC 3/17/20 22:11


220 MLS/HR


 


 Calcium Chloride


 3000 mg/Sodium


 Chloride  1,030 ml @ 


 50 mls/hr  [id number]  3/19/20 08:00


 3/21/20 15:23 DC 3/21/20 02:17


50 MLS/HR


 


 Calcium Gluconate


  (Calcium


 Gluconate)  2,000 mg  1X  ONCE  3/19/20 02:15


 3/19/20 02:16 DC 3/19/20 02:19


2,000 MG


 


 Calcium Gluconate


 1000 mg/Sodium


 Chloride  110 ml @ 


 220 mls/hr  1X  ONCE  3/18/20 03:30


 3/18/20 03:59 DC 3/18/20 03:21


220 MLS/HR


 


 Calcium Gluconate


 2000 mg/Sodium


 Chloride  120 ml @ 


 220 mls/hr  1X  ONCE  3/18/20 07:30


 3/18/20 08:02 DC 3/18/20 09:05


220 MLS/HR


 


 Cefepime HCl


  (Maxipime)  2 gm  Q12HR  3/25/20 09:00


    3/25/20 09:23


2 GM


 


 Daptomycin 500 mg/


 Sodium Chloride  50 ml @ 


 100 mls/hr  Q48H  3/25/20 08:30


    3/25/20 08:45


100 MLS/HR


 


 Dextrose


  (Dextrose


 50%-Water Syringe)  12.5 gm  PRN Q15MIN  PRN  3/16/20 09:30


     





 


 Digoxin


  (Lanoxin)  125 mcg  1X  ONCE  3/19/20 18:00


 3/19/20 18:01 DC 3/19/20 17:10


125 MCG


 


 Diphenhydramine


 HCl


  (Benadryl)  25 mg  1X PRN  PRN  3/23/20 07:30


 3/24/20 07:29 DC  





 


 Etomidate


  (Amidate)  8 mg  1X  ONCE  3/23/20 08:30


 3/23/20 08:31 DC 3/23/20 08:33


8 MG


 


 Fentanyl Citrate  30 ml @ 0


 mls/hr  CONT  PRN  3/23/20 08:15


    3/25/20 02:50


3.75 MLS/HR


 


 Fentanyl Citrate


  (Fentanyl 2ml


 Vial)  100 mcg  STK-MED ONCE  3/16/20 03:18


 3/16/20 03:18 DC  





 


 Furosemide


  (Lasix)  40 mg  1X  ONCE  3/17/20 22:30


 3/17/20 22:31 DC 3/17/20 22:12


40 MG


 


 Heparin Sodium


  (Porcine)


  (Heparin Sodium)  5,000 unit  Q8HRS  3/23/20 15:00


    3/24/20 22:15


5,000 UNIT


 


 Hydromorphone HCl


  (Dilaudid)  1 mg  PRN Q3HRS  PRN  3/17/20 12:00


    3/23/20 05:13


1 MG


 


 Info


  (CONTRAST GIVEN


 -- Rx MONITORING)  1 each  PRN DAILY  PRN  3/17/20 17:00


 3/19/20 16:59 DC  





 


 Info


  (PHARMACY


 MONITORING -- do


 not chart)  1 each  PRN DAILY  PRN  3/23/20 07:30


     





 


 Info


  (Tpn Per


 Pharmacy)  1 each  PRN DAILY  PRN  3/18/20 12:30


   UNV  





 


 Insulin Human


 Lispro


  (HumaLOG)  0-9 UNITS  Q6HRS  3/16/20 09:30


    3/25/20 00:06


7 UNITS


 


 Insulin Human


 Regular


  (HumuLIN R VIAL)  5 unit  1X  ONCE  3/17/20 22:30


 3/17/20 22:31 DC 3/17/20 22:14


5 UNIT


 


 Iohexol


  (Omnipaque 300


 Mg/ml)  60 ml  1X  ONCE  3/17/20 17:00


 3/17/20 17:01 DC 3/17/20 17:20


60 ML


 


 Iohexol


  (Omnipaque 350


 Mg/ml)  90 ml  1X  ONCE  3/16/20 03:30


 3/16/20 03:31 DC 3/16/20 03:25


90 ML


 


 Ketorolac


 Tromethamine


  (Toradol 30mg


 Vial)  30 mg  1X  ONCE  3/16/20 03:00


 3/16/20 03:01 DC 3/16/20 02:54


30 MG


 


 Lidocaine HCl


  (Buffered


 Lidocaine 1%)  6 ml  1X  ONCE  3/18/20 10:15


 3/18/20 10:16 DC 3/18/20 10:26


4 ML


 


 Lidocaine HCl


  (Glydo


  (Lidocaine) Jelly)  1 thomas  1X  ONCE  3/20/20 14:30


 3/20/20 14:31 DC 3/20/20 16:38


1 THOMAS


 


 Lidocaine HCl


  (Xylocaine-Mpf


 1% 2ml Vial)  2 ml  STK-MED ONCE  3/18/20 08:47


 3/18/20 08:47 DC  





 


 Linezolid/Dextrose  300 ml @ 


 300 mls/hr  Q12HR  3/20/20 20:00


    3/25/20 09:09


300 MLS/HR


 


 Lorazepam


  (Ativan Inj)  1 mg  PRN Q4HRS  PRN  3/19/20 09:00


    3/23/20 00:34


1 MG


 


 Magnesium Sulfate  100 ml @ 


 25 mls/hr  1X  ONCE  3/19/20 13:00


 3/19/20 16:59 DC 3/19/20 12:48


25 MLS/HR


 


 Meropenem 1 gm/


 Sodium Chloride  100 ml @ 


 200 mls/hr  Q8HRS  3/17/20 20:00


 3/18/20 08:48 DC 3/18/20 05:45


200 MLS/HR


 


 Meropenem 500 mg/


 Sodium Chloride  50 ml @ 


 100 mls/hr  Q6HRS  3/24/20 09:00


 3/25/20 07:29 DC 3/25/20 06:00


100 MLS/HR


 


 Metoprolol


 Tartrate


  (Lopressor Vial)  5 mg  Q6HRS  3/17/20 10:15


    3/25/20 02:54


5 MG


 


 Metronidazole  100 ml @ 


 100 mls/hr  Q6HRS  3/23/20 08:30


    3/25/20 06:54


100 MLS/HR


 


 Micafungin Sodium


 100 mg/Dextrose  100 ml @ 


 100 mls/hr  Q24H  3/23/20 09:00


    3/25/20 09:16


100 MLS/HR


 


 Midazolam HCl


  (Versed)  5 mg  1X  ONCE  3/23/20 08:30


 3/23/20 08:31 DC  





 


 Midazolam HCl 50


 mg/Sodium Chloride  50 ml @ 0


 mls/hr  CONT  PRN  3/23/20 08:15


    3/25/20 02:51


7 MLS/HR


 


 Morphine Sulfate


  (Morphine


 Sulfate)  2 mg  PRN Q2HR  PRN  3/16/20 05:00


 3/17/20 14:15 DC 3/17/20 12:26


2 MG


 


 Norepinephrine


 Bitartrate 8 mg/


 Dextrose  258 ml @ 


 17.299 mls/


 hr  CONT  PRN  3/17/20 15:30


    3/24/20 23:55


6.92 MLS/HR


 


 Ondansetron HCl


  (Zofran)  4 mg  PRN Q4HRS  PRN  3/16/20 09:30


    3/21/20 16:15


4 MG


 


 Pantoprazole


 Sodium


  (PROTONIX VIAL


 for IV PUSH)  40 mg  DAILYAC  3/16/20 11:30


    3/25/20 09:08


40 MG


 


 Piperacillin Sod/


 Tazobactam Sod


 4.5 gm/Sodium


 Chloride  100 ml @ 


 200 mls/hr  1X  ONCE  3/16/20 06:00


 3/16/20 06:29 DC 3/16/20 05:44


200 MLS/HR


 


 Potassium


 Chloride 15 meq/


 Bicarbonate


 Dialysis Soln w/


 out KCl  5,007.5 ml


  @ 1,000 mls/


 hr  Q5H1M  3/23/20 12:00


 3/24/20 11:19 DC 3/24/20 11:11


1,000 MLS/HR


 


 Potassium


 Chloride 20 meq/


 Bicarbonate


 Dialysis Soln w/


 out KCl  5,010 ml @ 


 1,000 mls/hr  Q5H1M  3/24/20 11:30


    3/25/20 08:54


1,000 MLS/HR


 


 Potassium


 Chloride/Water  100 ml @ 


 100 mls/hr  Q1H  3/24/20 11:00


 3/24/20 12:59 DC 3/24/20 12:12


100 MLS/HR


 


 Potassium


 Phosphate 20 mmol/


 Sodium Chloride  106.6667


 ml @ 


 51.667 m...  1X  ONCE  3/24/20 10:15


 3/24/20 12:18 DC 3/24/20 11:22


51.667 MLS/HR


 


 Prochlorperazine


 Edisylate


  (Compazine)  10 mg  PRN Q6HRS  PRN  3/16/20 17:45


    3/17/20 00:42


10 MG


 


 Propofol  0 ml @ As


 Directed  STK-MED ONCE  3/23/20 07:53


 3/23/20 07:53 DC  





 


 Sodium


 Bicarbonate 50


 meq/Sodium


 Chloride  1,050 ml @ 


 75 mls/hr  Q14H  3/18/20 07:30


 3/23/20 10:28 DC 3/22/20 21:10


75 MLS/HR


 


 Sodium Chloride


  (Normal Saline


 Flush)  10 ml  1X PRN  PRN  3/21/20 08:00


 3/22/20 07:59 DC  





 


 Sodium Chloride


 90 meq/Calcium


 Gluconate 10 meq/


 Multivitamins 10


 ml/Chromium/


 Copper/Manganese/


 Seleni/Zn 0.5 ml/


 Total Parenteral


 Nutrition/Amino


 Acids/Dextrose/


 Fat Emulsion


 Intravenous  1,512 ml @ 


 63 mls/hr  TPN  CONT  3/18/20 22:00


 3/19/20 21:59 DC 3/18/20 22:06


63 MLS/HR


 


 Sodium Chloride


 90 meq/Calcium


 Gluconate 10 meq/


 Multivitamins 10


 ml/Chromium/


 Copper/Manganese/


 Seleni/Zn 1 ml/


 Total Parenteral


 Nutrition/Amino


 Acids/Dextrose/


 Fat Emulsion


 Intravenous  55.005 ml


  @ 2.292


 mls/hr  TPN  CONT  3/18/20 22:00


 3/18/20 12:33 DC  





 


 Sodium Chloride


 90 meq/Magnesium


 Sulfate 10 meq/


 Calcium Gluconate


 20 meq/


 Multivitamins 10


 ml/Chromium/


 Copper/Manganese/


 Seleni/Zn 0.5 ml/


 Total Parenteral


 Nutrition/Amino


 Acids/Dextrose/


 Fat Emulsion


 Intravenous  1,512 ml @ 


 63 mls/hr  TPN  CONT  3/19/20 22:00


 3/20/20 21:59 DC 3/19/20 22:25


63 MLS/HR


 


 Sodium Chloride


 90 meq/Potassium


 Chloride 15 meq/


 Potassium


 Phosphate 10 mmol/


 Magnesium Sulfate


 10 meq/Calcium


 Gluconate 20 meq/


 Multivitamins 10


 ml/Chromium/


 Copper/Manganese/


 Seleni/Zn 0.5 ml/


 Total Parenteral


 Nutrition/Amino


 Acids/Dextrose/


 Fat Emulsion


 Intravenous  1,400 ml @ 


 58.333 mls/


 hr  TPN  CONT  3/23/20 22:00


 3/24/20 21:59 DC 3/23/20 21:42


58.333 MLS/HR


 


 Sodium Chloride


 90 meq/Potassium


 Chloride 15 meq/


 Potassium


 Phosphate 15 mmol/


 Magnesium Sulfate


 10 meq/Calcium


 Gluconate 15 meq/


 Multivitamins 10


 ml/Chromium/


 Copper/Manganese/


 Seleni/Zn 0.5 ml/


 Total Parenteral


 Nutrition/Amino


 Acids/Dextrose/


 Fat Emulsion


 Intravenous  1,400 ml @ 


 58.333 mls/


 hr  TPN  CONT  3/24/20 22:00


 3/25/20 21:59  3/24/20 22:17


58.333 MLS/HR


 


 Sodium Chloride


 90 meq/Potassium


 Chloride 15 meq/


 Potassium


 Phosphate 15 mmol/


 Magnesium Sulfate


 10 meq/Calcium


 Gluconate 20 meq/


 Multivitamins 10


 ml/Chromium/


 Copper/Manganese/


 Seleni/Zn 0.5 ml/


 Total Parenteral


 Nutrition/Amino


 Acids/Dextrose/


 Fat Emulsion


 Intravenous  1,200 ml @ 


 50 mls/hr  TPN  CONT  3/22/20 22:00


 3/22/20 14:17 DC  





 


 Succinylcholine


 Chloride


  (Anectine)  120 mg  1X  ONCE  3/23/20 08:30


 3/23/20 08:31 DC 3/23/20 08:34


120 MG








Lab





Laboratory Tests








Test


 3/24/20


12:53 3/24/20


16:30 3/25/20


00:02 3/25/20


00:55


 


Glucose (Fingerstick)


 225 mg/dL


(70-99) 


 274 mg/dL


(70-99) 





 


Sodium Level


 


 139 mmol/L


(136-145) 


 139 mmol/L


(136-145)


 


Potassium Level


 


 3.7 mmol/L


(3.5-5.1) 


 3.7 mmol/L


(3.5-5.1)


 


Chloride Level


 


 103 mmol/L


() 


 103 mmol/L


()


 


Carbon Dioxide Level


 


 29 mmol/L


(21-32) 


 23 mmol/L


(21-32)


 


Anion Gap  7 (6-14)   13 (6-14) 


 


Blood Urea Nitrogen


 


 36 mg/dL


(7-20) 


 42 mg/dL


(7-20)


 


Creatinine


 


 2.1 mg/dL


(0.6-1.0) 


 2.2 mg/dL


(0.6-1.0)


 


Estimated GFR


(Cockcroft-Gault) 


 25.0 


 


 23.7 





 


Glucose Level


 


 280 mg/dL


(70-99) 


 274 mg/dL


(70-99)


 


Calcium Level


 


 7.2 mg/dL


(8.5-10.1) 


 7.7 mg/dL


(8.5-10.1)


 


Phosphorus Level


 


 2.5 mg/dL


(2.6-4.7) 


 2.4 mg/dL


(2.6-4.7)


 


Magnesium Level


 


 2.3 mg/dL


(1.8-2.4) 


 2.3 mg/dL


(1.8-2.4)


 


Test


 3/25/20


06:00 3/25/20


08:00 


 





 


White Blood Count


 26.3 x10^3/uL


(4.0-11.0) 


 


 





 


Red Blood Count


 2.41 x10^6/uL


(3.50-5.40) 


 


 





 


Hemoglobin


 7.8 g/dL


(12.0-15.5) 


 


 





 


Hematocrit


 23.6 %


(36.0-47.0) 


 


 





 


Mean Corpuscular Volume 98 fL ()    


 


Mean Corpuscular Hemoglobin 33 pg (25-35)    


 


Mean Corpuscular Hemoglobin


Concent 33 g/dL


(31-37) 


 


 





 


Red Cell Distribution Width


 13.4 %


(11.5-14.5) 


 


 





 


Platelet Count


 292 x10^3/uL


(140-400) 


 


 





 


Neutrophils (%) (Auto) 90 % (31-73)    


 


Lymphocytes (%) (Auto) 4 % (24-48)    


 


Monocytes (%) (Auto) 5 % (0-9)    


 


Eosinophils (%) (Auto) 1 % (0-3)    


 


Basophils (%) (Auto) 0 % (0-3)    


 


Neutrophils # (Auto)


 23.8 x10^3/uL


(1.8-7.7) 


 


 





 


Lymphocytes # (Auto)


 1.0 x10^3/uL


(1.0-4.8) 


 


 





 


Monocytes # (Auto)


 1.2 x10^3/uL


(0.0-1.1) 


 


 





 


Eosinophils # (Auto)


 0.3 x10^3/uL


(0.0-0.7) 


 


 





 


Basophils # (Auto)


 0.0 x10^3/uL


(0.0-0.2) 


 


 





 


Segmented Neutrophils % 73 % (35-66)    


 


Band Neutrophils % 19 % (0-9)    


 


Lymphocytes % 3 % (24-48)    


 


Monocytes % 1 % (0-10)    


 


Metamyelocytes % 3 % (0-0)    


 


Myelocytes % 1 % (0-0)    


 


Toxic Granulation Slight    


 


Dohle Bodies Few    


 


Platelet Estimate


 Adequate


(ADEQUATE) 


 


 





 


Sodium Level


 137 mmol/L


(136-145) 


 


 





 


Potassium Level


 3.6 mmol/L


(3.5-5.1) 


 


 





 


Chloride Level


 103 mmol/L


() 


 


 





 


Carbon Dioxide Level


 26 mmol/L


(21-32) 


 


 





 


Anion Gap 8 (6-14)    


 


Blood Urea Nitrogen


 50 mg/dL


(7-20) 


 


 





 


Creatinine


 2.7 mg/dL


(0.6-1.0) 


 


 





 


Estimated GFR


(Cockcroft-Gault) 18.7 


 


 


 





 


BUN/Creatinine Ratio 19 (6-20)    


 


Glucose Level


 270 mg/dL


(70-99) 


 


 





 


Calcium Level


 7.5 mg/dL


(8.5-10.1) 


 


 





 


Phosphorus Level


 2.3 mg/dL


(2.6-4.7) 


 


 





 


Magnesium Level


 2.3 mg/dL


(1.8-2.4) 


 


 





 


Total Bilirubin


 0.4 mg/dL


(0.2-1.0) 


 


 





 


Aspartate Amino Transf


(AST/SGOT) 27 U/L (15-37) 


 


 


 





 


Alanine Aminotransferase


(ALT/SGPT) 12 U/L (14-59) 


 


 


 





 


Alkaline Phosphatase


 82 U/L


() 


 


 





 


Total Protein


 4.3 g/dL


(6.4-8.2) 


 


 





 


Albumin


 1.4 g/dL


(3.4-5.0) 


 


 





 


Albumin/Globulin Ratio 0.5 (1.0-1.7)    


 


Lactic Acid Level


 


 1.4 mmol/L


(0.4-2.0) 


 











Results


All relevant outside records, renal labs, imaging studies, telemetry/EKG's were 

reviewed.


Other


CxR--


 Moderate congestive changes with small left pleural effusion unchanged.











KIMBERLY MYERS MD                Mar 25, 2020 09:49

## 2020-03-25 NOTE — NUR
Around 2130 CRRT alarming for high TMP, patient fluid removal decreased from 400 to 300 and 
system was flushed. TMP remained high and fluid removal ultimately was decreased to 100. 
Dialysis nurse called at 2145 to see if any other interventions could be completed. Dialysis 
said continue to flush system and adjust fluid removal as tolerated. Dialysis nurse also 
said since patient is restart in AM there is no need to call if the machine does clot off. 
TMP continued to rise and clotted off at 2230. Blood was returned to patient and patient was 
disconnected from system.

## 2020-03-25 NOTE — NUR
SS following up with discharge planning. SS discussed with pt RN. Pt remains on the vent and 
CRRT at this time. Not medically stable. SS will continue to follow for discharge planning.

## 2020-03-25 NOTE — NUR
Pharmacy TPN Dosing Note



S: JESENIA RICH is a 49 year old F Currently receiving Central Continuous TPN started 
03/18/20



B:Pertinent PMH: 

Necrotizing pancreatitis

Height: 5 feet, 8 inches

Weight: 106.232092 kg



Current diet: NPO 



LABS:

Sodium:    137 

Potassium: 3.6 

Chloride:  103 

Calcium:   7.5 

Corrected Calcium: 9.58 

Magnesium: 2.3 

CO2:       26 

SCr:       2.7 

Glucose:   270 

Albumin:   1.4 

AST:       56 

ALT:       45 



TPN FORMULA:

TPN TYPE:  Central Continuous

AMINO ACIDS:         125 gm

DEXTROSE:            195 gm

LIPIDS:              40 gm

SODIUM CHLORIDE:     90 mEq

SODIUM ACETATE:      -- mEq

SODIUM PHOSPHATE:    -- mmol

POTASSIUM CHLORIDE:  15 mEq

POTASSIUM ACETATE:   -- mEq

POTASSIUM PHOSPHATE: 18 mmol

MAGNESIUM:           8 mEq

CALCIUM:             15 mEq

INSULIN:             -- units

MULTIPLE VITAMIN:    10 ml

TRACE ELEMENTS:      0.5 ml(s)



TPN PLAN:  

Will have to replace Ca and Phos outside of TPN if needed, at maxiumum

concentrations due to Ca-Phos precipitation concern

Phos low: 20mmol bolus x1 today

Mag on upper end: decreased just slightly to 8meq

Labs in the am





R: Continue TPN as written above.

Will monitor electrolytes, glucose, and tolerance to TPN.



 PRESTON MCCULLOUGH RPH, 03/25/20 [id number]

## 2020-03-25 NOTE — PDOC
Infectious Disease Note


Subjective


Subjective


Fever Tmax 101


Intubated FiO2 40% PEEP 10


On Levophed gtt this am - dose varies currently about 8 mics


TPN





ROS


ROS


unable to obtain





Vital Sign


Vital Signs





Vital Signs








  Date Time  Temp Pulse Resp B/P (MAP) Pulse Ox O2 Delivery O2 Flow Rate FiO2


 


3/25/20 05:00 101.0 118 26 93/50 (64) 100 Ventilator  





 101.0       


 


3/25/20 02:50       6.0 











Physical Exam


PHYSICAL EXAM


GENERAL:Intubated/sedated


HEENT: Mild icterus.  Oral mucosa very dry.


NECK:  Supple. 


LUNGS:  Decreased breath sounds at the bases.


HEART:  S1, S2, tachycardia, 120s, regular 


ABDOMEN:  Distended, + BS. Rectal tube in place - soft


: [place]   


EXTREMITIES: Trace edema, no cyanosis - less mottled but toes appear a little 

ischemic.


DERMATOLOGIC:  Warm and dry.  No generalized rash.  


CENTRAL NERVOUS SYSTEM: Intubated


IV:  RIJ Temp HDC & RUE PICC - Left IJ





Labs


Lab





Laboratory Tests








Test


 3/24/20


08:05 3/24/20


12:53 3/24/20


16:30 3/25/20


00:02


 


O2 Saturation 99 % (92-99)    


 


Arterial Blood pH


 7.47


(7.35-7.45) 


 


 





 


Arterial Blood pCO2 at


Patient Temp 34 mmHg


(35-46) 


 


 





 


Arterial Blood pO2 at Patient


Temp 161 mmHg


() 


 


 





 


Arterial Blood HCO3


 24 mmol/L


(21-28) 


 


 





 


Arterial Blood Base Excess


 1 mmol/L


(-3-3) 


 


 





 


FiO2 90    


 


Glucose (Fingerstick)


 


 225 mg/dL


(70-99) 


 274 mg/dL


(70-99)


 


Sodium Level


 


 


 139 mmol/L


(136-145) 





 


Potassium Level


 


 


 3.7 mmol/L


(3.5-5.1) 





 


Chloride Level


 


 


 103 mmol/L


() 





 


Carbon Dioxide Level


 


 


 29 mmol/L


(21-32) 





 


Anion Gap   7 (6-14)  


 


Blood Urea Nitrogen


 


 


 36 mg/dL


(7-20) 





 


Creatinine


 


 


 2.1 mg/dL


(0.6-1.0) 





 


Estimated GFR


(Cockcroft-Gault) 


 


 25.0 


 





 


Glucose Level


 


 


 280 mg/dL


(70-99) 





 


Calcium Level


 


 


 7.2 mg/dL


(8.5-10.1) 





 


Phosphorus Level


 


 


 2.5 mg/dL


(2.6-4.7) 





 


Magnesium Level


 


 


 2.3 mg/dL


(1.8-2.4) 





 


Test


 3/25/20


00:55 3/25/20


06:00 


 





 


Sodium Level


 139 mmol/L


(136-145) 137 mmol/L


(136-145) 


 





 


Potassium Level


 3.7 mmol/L


(3.5-5.1) 3.6 mmol/L


(3.5-5.1) 


 





 


Chloride Level


 103 mmol/L


() 103 mmol/L


() 


 





 


Carbon Dioxide Level


 23 mmol/L


(21-32) 26 mmol/L


(21-32) 


 





 


Anion Gap 13 (6-14)  8 (6-14)   


 


Blood Urea Nitrogen


 42 mg/dL


(7-20) 50 mg/dL


(7-20) 


 





 


Creatinine


 2.2 mg/dL


(0.6-1.0) 2.7 mg/dL


(0.6-1.0) 


 





 


Estimated GFR


(Cockcroft-Gault) 23.7 


 18.7 


 


 





 


Glucose Level


 274 mg/dL


(70-99) 270 mg/dL


(70-99) 


 





 


Calcium Level


 7.7 mg/dL


(8.5-10.1) 7.5 mg/dL


(8.5-10.1) 


 





 


Phosphorus Level


 2.4 mg/dL


(2.6-4.7) 2.3 mg/dL


(2.6-4.7) 


 





 


Magnesium Level


 2.3 mg/dL


(1.8-2.4) 2.3 mg/dL


(1.8-2.4) 


 





 


White Blood Count


 


 26.3 x10^3/uL


(4.0-11.0) 


 





 


Red Blood Count


 


 2.41 x10^6/uL


(3.50-5.40) 


 





 


Hemoglobin


 


 7.8 g/dL


(12.0-15.5) 


 





 


Hematocrit


 


 23.6 %


(36.0-47.0) 


 





 


Mean Corpuscular Volume  98 fL ()   


 


Mean Corpuscular Hemoglobin  33 pg (25-35)   


 


Mean Corpuscular Hemoglobin


Concent 


 33 g/dL


(31-37) 


 





 


Red Cell Distribution Width


 


 13.4 %


(11.5-14.5) 


 





 


Platelet Count


 


 292 x10^3/uL


(140-400) 


 





 


Neutrophils (%) (Auto)  90 % (31-73)   


 


Lymphocytes (%) (Auto)  4 % (24-48)   


 


Monocytes (%) (Auto)  5 % (0-9)   


 


Eosinophils (%) (Auto)  1 % (0-3)   


 


Basophils (%) (Auto)  0 % (0-3)   


 


Neutrophils # (Auto)


 


 23.8 x10^3/uL


(1.8-7.7) 


 





 


Lymphocytes # (Auto)


 


 1.0 x10^3/uL


(1.0-4.8) 


 





 


Monocytes # (Auto)


 


 1.2 x10^3/uL


(0.0-1.1) 


 





 


Eosinophils # (Auto)


 


 0.3 x10^3/uL


(0.0-0.7) 


 





 


Basophils # (Auto)


 


 0.0 x10^3/uL


(0.0-0.2) 


 





 


BUN/Creatinine Ratio  19 (6-20)   


 


Total Bilirubin


 


 0.4 mg/dL


(0.2-1.0) 


 





 


Aspartate Amino Transf


(AST/SGOT) 


 27 U/L (15-37) 


 


 





 


Alanine Aminotransferase


(ALT/SGPT) 


 12 U/L (14-59) 


 


 





 


Alkaline Phosphatase


 


 82 U/L


() 


 





 


Total Protein


 


 4.3 g/dL


(6.4-8.2) 


 





 


Albumin


 


 1.4 g/dL


(3.4-5.0) 


 





 


Albumin/Globulin Ratio  0.5 (1.0-1.7)   








Micro


STAT CXR and KUB reviewed on 3/23








Microbiology


3/18/20 Blood Culture - Final, Complete


          NO GROWTH AFTER 5 DAYS





Objective


Assessment


Fever - ? reactive with pancreatitis vs ID - C-diff neg


Hypotension - levophed started  am 3/23


Leukocytosis - worse today ? developing peripheral ischemia


Acute pancreatitis, early developing necrosis


Cholelithiasis


JED,Hyperkalemia, Metabolic acidosis on CRRT


Acute hypoxic resp failure, intubated


Hypocalcemia 


Prediabetes


HTN





Plan


Plan of Care





Check Lactic acid


F/u Blood cults 3/24


CT ordered but on CRRT will need when stable


Add Flagyl and Micafungin 3/23


Will D/c Meropenem with fever and Leukocytosis Dose Cefepime and Dapto


Cont Zyvox (3/20)


f/u labs and cults


No surgical plans at this time





Electrolytes per primary





Critically ill





D/w nursing











RASHAWN ROSEN MD              Mar 25, 2020 07:30

## 2020-03-25 NOTE — PDOC
[facility] PROGRESS NOTE


Chief Complaint


Chief Complaint


Respiratory failure requiring intubation this morning


Severe Acute pancreatitis


Acute kidney failure now requiring dialysis


Salpingo--itis


Gallstones (Calculus of gallbladder with acute cholecystitis without 

obstruction)


HTN 


Leukocytosis 


Hypoxia


Uterine fibroid


Hypoxia with respiratory failure


Intractable pain


Intractable nausea





History of Present Illness


History of Present Illness


[id number]


Patient seen and examined in the ICU


She is still requiring CRRT


On the vent with assist control but her FiO2 is down to 40% from yesterday


Slightly better but still very critically ill


Discussed with RN


Chart reviewed








[id number]


Patient seen and examined in the ICU


She remains extremely critically ill


On IV fentanyl IV Versed IV Doxy


On the vent


Assist-control/25/450/70%


She is critically ill


Discussed with RN


Chart reviewed


Patient is currently on CRRT as well








[id number]


Patient seen and examined in the ICU


She had to be intubated this morning


On assist-control 25/450/100% with 10 of PEEP and only satting 87%


She is extremely critically ill


I'm not sure if she will survive


Chart reviewed


Discussed with RN











Ms Tadeo is a 48yo F w/ PMHx HTN, prediabetes who presents the emergency room

complaints of abdominal pain. Patient described off and on 3 days. She states is

constant, described as a squeezing sensation in a band-like distribution. + 

nausea, vomiting.  She denies any fever or diarrhea.  Patient denies any 

abdominal surgical procedures.  She states is worse with movements, car ride.  

Pain initially was upper abdomen however now pretty much generalized.  Last 

bowel movement was 3/15/2020. Nothing makes her pain better.  Patient denies any

shortness of breath.  She does state the pain moves into her chest.  Denies any 

headache or visual changes.


Lipase 95935, , , Bilirubin 1.4.


CT abdomen confirms pancreatic inflammation, peripancreatic fluid and 

inflammatory changes around the pancreas consistent with pancreatitis. 

Cholelithiasis and 1.4cm uterine fibroid as well as possible left salpingitis. 

Admitted for further care


GI, General surgery, ID, Pulm consulted.





3/17: Overnight per report no urine output. Added dilaudid for pain, PICC placed

per IR. Renal US negative.Seen bedside in ICU, given 2L additional NSS and 

albumin infusion. Still hypotensive, started on levophed. Repeat CT abdomen with

necrosis. Updated her fiancee


3/18: Sats are only 87% on nasal cannula oxygen. Dialysis catheter per 

nephrology


3/19: She is now on BiPAP appears more ill, now on dialysis


3/20: Seen on BiPAP. Her mother and another family member are present and seemed

to be good support for her. Currently on dialysis. Appears critically ill


3/21: Overnight Tmax 101.7 , still on BiPAP FiO2 40%, still on low dose Levophed

gtt, TPN initiated. On dialysis





Overnight still febrile. Dialysis today. She wakes up and responds to pain. No 

CP.





Vitals/I&O


Vitals/I&O:





                                   Vital Signs








  Date Time  Temp Pulse Resp B/P (MAP) Pulse Ox O2 Delivery O2 Flow Rate FiO2


 


3/25/20 08:00     99 Ventilator  


 


3/25/20 06:00  117 26 86/47 (60)    


 


3/25/20 05:00 101.0       





 101.0       


 


3/25/20 02:50       6.0 














                                    I & O   


 


 3/24/20 3/24/20 3/25/20





 14:59 22:59 06:59


 


Intake Total 700 ml 1318 ml 1070.0 ml


 


Output Total 74 ml 340 ml 80 ml


 


Balance 626 ml 978 ml 990.0 ml











Physical Exam


Physical Exam:


GENERAL:Intubated/sedated


HEENT: Mild icterus.  Oral mucosa very dry.


NECK:  Supple. 


LUNGS:  Decreased breath sounds at the bases.


HEART:  S1, S2, tachycardia, 120s, regular 


ABDOMEN:  Distended, + BS. Rectal tube in place - soft


: [place]   


EXTREMITIES: Trace edema, no cyanosis - less mottled but toes appear a little 

ischemic.


DERMATOLOGIC:  Warm and dry.  No generalized rash.  


CENTRAL NERVOUS SYSTEM: Intubated


IV:  RIJ Temp HDC & RUE PICC - Left IJ


General:  No acute distress


Heart:  Other (increased rate)


Lungs:  Crackles, Other (dull at bases)


Abdomen:  Soft, Other (ND)


Extremities:  No edema, Other (SOME CLUBBING )


Skin:  Other (mottling noted to extremities )





Labs


Labs:





Laboratory Tests








Test


 3/24/20


12:53 3/24/20


16:30 3/25/20


00:02 3/25/20


00:55


 


Glucose (Fingerstick)


 225 mg/dL


(70-99) 


 274 mg/dL


(70-99) 





 


Sodium Level


 


 139 mmol/L


(136-145) 


 139 mmol/L


(136-145)


 


Potassium Level


 


 3.7 mmol/L


(3.5-5.1) 


 3.7 mmol/L


(3.5-5.1)


 


Chloride Level


 


 103 mmol/L


() 


 103 mmol/L


()


 


Carbon Dioxide Level


 


 29 mmol/L


(21-32) 


 23 mmol/L


(21-32)


 


Anion Gap  7 (6-14)   13 (6-14) 


 


Blood Urea Nitrogen


 


 36 mg/dL


(7-20) 


 42 mg/dL


(7-20)


 


Creatinine


 


 2.1 mg/dL


(0.6-1.0) 


 2.2 mg/dL


(0.6-1.0)


 


Estimated GFR


(Cockcroft-Gault) 


 25.0 


 


 23.7 





 


Glucose Level


 


 280 mg/dL


(70-99) 


 274 mg/dL


(70-99)


 


Calcium Level


 


 7.2 mg/dL


(8.5-10.1) 


 7.7 mg/dL


(8.5-10.1)


 


Phosphorus Level


 


 2.5 mg/dL


(2.6-4.7) 


 2.4 mg/dL


(2.6-4.7)


 


Magnesium Level


 


 2.3 mg/dL


(1.8-2.4) 


 2.3 mg/dL


(1.8-2.4)


 


Test


 3/25/20


06:00 3/25/20


08:00 


 





 


White Blood Count


 26.3 x10^3/uL


(4.0-11.0) 


 


 





 


Red Blood Count


 2.41 x10^6/uL


(3.50-5.40) 


 


 





 


Hemoglobin


 7.8 g/dL


(12.0-15.5) 


 


 





 


Hematocrit


 23.6 %


(36.0-47.0) 


 


 





 


Mean Corpuscular Volume 98 fL ()    


 


Mean Corpuscular Hemoglobin 33 pg (25-35)    


 


Mean Corpuscular Hemoglobin


Concent 33 g/dL


(31-37) 


 


 





 


Red Cell Distribution Width


 13.4 %


(11.5-14.5) 


 


 





 


Platelet Count


 292 x10^3/uL


(140-400) 


 


 





 


Neutrophils (%) (Auto) 90 % (31-73)    


 


Lymphocytes (%) (Auto) 4 % (24-48)    


 


Monocytes (%) (Auto) 5 % (0-9)    


 


Eosinophils (%) (Auto) 1 % (0-3)    


 


Basophils (%) (Auto) 0 % (0-3)    


 


Neutrophils # (Auto)


 23.8 x10^3/uL


(1.8-7.7) 


 


 





 


Lymphocytes # (Auto)


 1.0 x10^3/uL


(1.0-4.8) 


 


 





 


Monocytes # (Auto)


 1.2 x10^3/uL


(0.0-1.1) 


 


 





 


Eosinophils # (Auto)


 0.3 x10^3/uL


(0.0-0.7) 


 


 





 


Basophils # (Auto)


 0.0 x10^3/uL


(0.0-0.2) 


 


 





 


Sodium Level


 137 mmol/L


(136-145) 


 


 





 


Potassium Level


 3.6 mmol/L


(3.5-5.1) 


 


 





 


Chloride Level


 103 mmol/L


() 


 


 





 


Carbon Dioxide Level


 26 mmol/L


(21-32) 


 


 





 


Anion Gap 8 (6-14)    


 


Blood Urea Nitrogen


 50 mg/dL


(7-20) 


 


 





 


Creatinine


 2.7 mg/dL


(0.6-1.0) 


 


 





 


Estimated GFR


(Cockcroft-Gault) 18.7 


 


 


 





 


BUN/Creatinine Ratio 19 (6-20)    


 


Glucose Level


 270 mg/dL


(70-99) 


 


 





 


Calcium Level


 7.5 mg/dL


(8.5-10.1) 


 


 





 


Phosphorus Level


 2.3 mg/dL


(2.6-4.7) 


 


 





 


Magnesium Level


 2.3 mg/dL


(1.8-2.4) 


 


 





 


Total Bilirubin


 0.4 mg/dL


(0.2-1.0) 


 


 





 


Aspartate Amino Transf


(AST/SGOT) 27 U/L (15-37) 


 


 


 





 


Alanine Aminotransferase


(ALT/SGPT) 12 U/L (14-59) 


 


 


 





 


Alkaline Phosphatase


 82 U/L


() 


 


 





 


Total Protein


 4.3 g/dL


(6.4-8.2) 


 


 





 


Albumin


 1.4 g/dL


(3.4-5.0) 


 


 





 


Albumin/Globulin Ratio 0.5 (1.0-1.7)    


 


Lactic Acid Level


 


 1.4 mmol/L


(0.4-2.0) 


 














Assessment and Plan


Assessmemt and Plan


Problems


Medical Problems:


(1) Acute pancreatitis


Status: Acute  





(2) Cholelithiasis


Status: Acute  





Respiratory failure requiring intubation this morning 


Severe Acute pancreatitis


Acute kidney failure now requiring dialysis


Salpingo--itis


Gallstones (Calculus of gallbladder with acute cholecystitis without 

obstruction)


HTN 


Leukocytosis 


Hypoxia


Uterine fibroid


Hypoxia with respiratory failure


Intractable pain


Intractable nausea











Plan


CRRT


Mechanical ventilation


Hemodialysis


BiPAP


ICU monitoring


Trend labs especially white count and lipase levels


We have consulted GI and general surgery


IV antibiotics


IV fluids


CT narcotics


When necessary anti-medics


Home meds if possible


DVT prophylaxis


Full code


She is critically ill


Total time 34 minutes





Comment


Review of Relevant


I have reviewed the following items josy (where applicable) has been applied.


Medications:





Current Medications








 Medications


  (Trade)  Dose


 Ordered  Sig/Yee


 Route


 PRN Reason  Start Time


 Stop Time Status Last Admin


Dose Admin


 


 Meropenem 500 mg/


 Sodium Chloride  50 ml @ 


 100 mls/hr  Q6HRS


 IV


   3/24/20 09:00


 3/25/20 07:29 DC 3/25/20 06:00





 


 Potassium


 Phosphate 20 mmol/


 Sodium Chloride  106.6667


 ml @ 


 51.667 m...  1X  ONCE


 IV


   3/24/20 10:15


 3/24/20 12:18 DC 3/24/20 11:22





 


 Acetaminophen


  (Tylenol Supp)  650 mg  PRN Q6HRS  PRN


 CT


 MILD PAIN / TEMP  3/24/20 10:30


    3/24/20 10:37





 


 Potassium


 Chloride/Water  100 ml @ 


 100 mls/hr  Q1H


 IV


   3/24/20 11:00


 3/24/20 12:59 DC 3/24/20 12:12





 


 Potassium


 Chloride 20 meq/


 Bicarbonate


 Dialysis Soln w/


 out KCl  5,010 ml @ 


 1,000 mls/hr  Q5H1M


 IV


   3/24/20 12:00


 3/25/20 13:03  3/25/20 08:48





 


 Potassium


 Chloride 20 meq/


 Bicarbonate


 Dialysis Soln w/


 out KCl  5,010 ml @ 


 1,000 mls/hr  Q5H1M


 IV


   3/24/20 11:30


    3/25/20 08:48





 


 Potassium


 Chloride 20 meq/


 Bicarbonate


 Dialysis Soln w/


 out KCl  5,010 ml @ 


 1,000 mls/hr  Q5H1M


 IV


   3/24/20 11:30


    3/25/20 08:54





 


 Sodium Chloride


 90 meq/Potassium


 Chloride 15 meq/


 Potassium


 Phosphate 15 mmol/


 Magnesium Sulfate


 10 meq/Calcium


 Gluconate 15 meq/


 Multivitamins 10


 ml/Chromium/


 Copper/Manganese/


 Seleni/Zn 0.5 ml/


 Total Parenteral


 Nutrition/Amino


 Acids/Dextrose/


 Fat Emulsion


 Intravenous  1,400 ml @ 


 58.333 mls/


 hr  TPN  CONT


 IV


   3/24/20 22:00


 3/25/20 21:59  3/24/20 22:17





 


 Daptomycin 500 mg/


 Sodium Chloride  50 ml @ 


 100 mls/hr  Q48H


 IV


   3/25/20 08:30


    3/25/20 08:45














Hemodynamically unstable?:  Yes


Is patient in severe pain?:  Yes


Is NPO status required?:  Yes











HECTOR MASON III DO           Mar 25, 2020 08:56

## 2020-03-25 NOTE — RAD
EXAM: CHEST 1 VIEW 

 

History: Ventilation 

 

COMPARISON: 3/24/2020

 

TECHNIQUE: Single portable radiograph of the chest

 

FINDINGS:  

 

Low lung volumes and technique accentuates heart size and pulmonary 

vascularity. Moderate prominent bilateral interstitial lung markings 

likely congestive changes. The ET tube, feeding tube, right-sided PICC 

line, left-sided internal jugular line, right-sided dialysis catheter are 

identified. Probable small left pleural effusion.

 

IMPRESSION:

 

 

1. Lines and tubes as described.

 

2. Moderate congestive changes with small left pleural effusion unchanged.

 

 

 

Electronically signed by: Logan Duran MD (3/25/2020 8:28 AM) [id number]

## 2020-03-25 NOTE — PDOC
PULMONARY PROGRESS NOTES


Subjective


Patient intubated on 3/23 T-max 102F


she is on 1000% FiO2 10 of PEEP


Vitals





Vital Signs








  Date Time  Temp Pulse Resp B/P (MAP) Pulse Ox O2 Delivery O2 Flow Rate FiO2


 


3/25/20 14:00 98.8 108 24 105/56 (72) 97 Ventilator  





 98.8       


 


3/25/20 10:41       6.0 








HEENT:  Other (nc at perrl   bipap mask on   neck no lad   no thyromegaly)


Lungs:  Crackles


Cardiovascular:  S1, S2


Abdomen:  Other (distended,  diffuse pain   no mass)


Extremities:  No Edema


Skin:  Warm


Labs





Laboratory Tests








Test


 3/23/20


15:40 3/23/20


17:35 3/23/20


18:26 3/23/20


21:17


 


Clostridium difficile Toxin B


Gene Negative


(Negative) 


 


 





 


O2 Saturation  93 % (92-99)   


 


Arterial Blood pH


 


 7.46


(7.35-7.45) 


 





 


Arterial Blood pCO2 at


Patient Temp 


 40 mmHg


(35-46) 


 





 


Arterial Blood pO2 at Patient


Temp 


 63 mmHg


() 


 





 


Arterial Blood HCO3


 


 27 mmol/L


(21-28) 


 





 


Arterial Blood Base Excess


 


 3 mmol/L


(-3-3) 


 





 


FiO2  90 +10peep   


 


Glucose (Fingerstick)


 


 


 272 mg/dL


(70-99) 





 


Sodium Level


 


 


 


 139 mmol/L


(136-145)


 


Potassium Level


 


 


 


 3.4 mmol/L


(3.5-5.1)


 


Chloride Level


 


 


 


 101 mmol/L


()


 


Carbon Dioxide Level


 


 


 


 32 mmol/L


(21-32)


 


Anion Gap    6 (6-14) 


 


Blood Urea Nitrogen


 


 


 


 28 mg/dL


(7-20)


 


Creatinine


 


 


 


 2.6 mg/dL


(0.6-1.0)


 


Estimated GFR


(Cockcroft-Gault) 


 


 


 19.6 





 


Glucose Level


 


 


 


 198 mg/dL


(70-99)


 


Calcium Level


 


 


 


 7.5 mg/dL


(8.5-10.1)


 


Phosphorus Level


 


 


 


 2.5 mg/dL


(2.6-4.7)


 


Magnesium Level


 


 


 


 2.2 mg/dL


(1.8-2.4)


 


Test


 3/24/20


00:05 3/24/20


05:15 3/24/20


05:49 3/24/20


08:05


 


Glucose (Fingerstick)


 222 mg/dL


(70-99) 


 217 mg/dL


(70-99) 





 


White Blood Count


 


 20.5 x10^3/uL


(4.0-11.0) 


 





 


Red Blood Count


 


 2.52 x10^6/uL


(3.50-5.40) 


 





 


Hemoglobin


 


 8.3 g/dL


(12.0-15.5) 


 





 


Hematocrit


 


 24.4 %


(36.0-47.0) 


 





 


Mean Corpuscular Volume  97 fL ()   


 


Mean Corpuscular Hemoglobin  33 pg (25-35)   


 


Mean Corpuscular Hemoglobin


Concent 


 34 g/dL


(31-37) 


 





 


Red Cell Distribution Width


 


 13.1 %


(11.5-14.5) 


 





 


Platelet Count


 


 242 x10^3/uL


(140-400) 


 





 


Neutrophils (%) (Auto)  93 % (31-73)   


 


Lymphocytes (%) (Auto)  2 % (24-48)   


 


Monocytes (%) (Auto)  3 % (0-9)   


 


Eosinophils (%) (Auto)  2 % (0-3)   


 


Basophils (%) (Auto)  0 % (0-3)   


 


Neutrophils # (Auto)


 


 19.1 x10^3/uL


(1.8-7.7) 


 





 


Lymphocytes # (Auto)


 


 0.4 x10^3/uL


(1.0-4.8) 


 





 


Monocytes # (Auto)


 


 0.6 x10^3/uL


(0.0-1.1) 


 





 


Eosinophils # (Auto)


 


 0.4 x10^3/uL


(0.0-0.7) 


 





 


Basophils # (Auto)


 


 0.1 x10^3/uL


(0.0-0.2) 


 





 


Sodium Level


 


 137 mmol/L


(136-145) 


 





 


Potassium Level


 


 3.3 mmol/L


(3.5-5.1) 


 





 


Chloride Level


 


 101 mmol/L


() 


 





 


Carbon Dioxide Level


 


 30 mmol/L


(21-32) 


 





 


Anion Gap  6 (6-14)   


 


Blood Urea Nitrogen


 


 32 mg/dL


(7-20) 


 





 


Creatinine


 


 2.2 mg/dL


(0.6-1.0) 


 





 


Estimated GFR


(Cockcroft-Gault) 


 23.7 


 


 





 


Glucose Level


 


 239 mg/dL


(70-99) 


 





 


Calcium Level


 


 7.5 mg/dL


(8.5-10.1) 


 





 


Phosphorus Level


 


 2.2 mg/dL


(2.6-4.7) 


 





 


Magnesium Level


 


 2.2 mg/dL


(1.8-2.4) 


 





 


Thyroid Stimulating Hormone


(TSH) 


 1.091 uIU/mL


(0.358-3.74) 


 





 


O2 Saturation    99 % (92-99) 


 


Arterial Blood pH


 


 


 


 7.47


(7.35-7.45)


 


Arterial Blood pCO2 at


Patient Temp 


 


 


 34 mmHg


(35-46)


 


Arterial Blood pO2 at Patient


Temp 


 


 


 161 mmHg


()


 


Arterial Blood HCO3


 


 


 


 24 mmol/L


(21-28)


 


Arterial Blood Base Excess


 


 


 


 1 mmol/L


(-3-3)


 


FiO2    90 


 


Test


 3/24/20


12:53 3/24/20


16:30 3/25/20


00:02 3/25/20


00:55


 


Glucose (Fingerstick)


 225 mg/dL


(70-99) 


 274 mg/dL


(70-99) 





 


Sodium Level


 


 139 mmol/L


(136-145) 


 139 mmol/L


(136-145)


 


Potassium Level


 


 3.7 mmol/L


(3.5-5.1) 


 3.7 mmol/L


(3.5-5.1)


 


Chloride Level


 


 103 mmol/L


() 


 103 mmol/L


()


 


Carbon Dioxide Level


 


 29 mmol/L


(21-32) 


 23 mmol/L


(21-32)


 


Anion Gap  7 (6-14)   13 (6-14) 


 


Blood Urea Nitrogen


 


 36 mg/dL


(7-20) 


 42 mg/dL


(7-20)


 


Creatinine


 


 2.1 mg/dL


(0.6-1.0) 


 2.2 mg/dL


(0.6-1.0)


 


Estimated GFR


(Cockcroft-Gault) 


 25.0 


 


 23.7 





 


Glucose Level


 


 280 mg/dL


(70-99) 


 274 mg/dL


(70-99)


 


Calcium Level


 


 7.2 mg/dL


(8.5-10.1) 


 7.7 mg/dL


(8.5-10.1)


 


Phosphorus Level


 


 2.5 mg/dL


(2.6-4.7) 


 2.4 mg/dL


(2.6-4.7)


 


Magnesium Level


 


 2.3 mg/dL


(1.8-2.4) 


 2.3 mg/dL


(1.8-2.4)


 


Test


 3/25/20


06:00 3/25/20


08:00 3/25/20


08:55 





 


White Blood Count


 26.3 x10^3/uL


(4.0-11.0) 


 


 





 


Red Blood Count


 2.41 x10^6/uL


(3.50-5.40) 


 


 





 


Hemoglobin


 7.8 g/dL


(12.0-15.5) 


 


 





 


Hematocrit


 23.6 %


(36.0-47.0) 


 


 





 


Mean Corpuscular Volume 98 fL ()    


 


Mean Corpuscular Hemoglobin 33 pg (25-35)    


 


Mean Corpuscular Hemoglobin


Concent 33 g/dL


(31-37) 


 


 





 


Red Cell Distribution Width


 13.4 %


(11.5-14.5) 


 


 





 


Platelet Count


 292 x10^3/uL


(140-400) 


 


 





 


Neutrophils (%) (Auto) 90 % (31-73)    


 


Lymphocytes (%) (Auto) 4 % (24-48)    


 


Monocytes (%) (Auto) 5 % (0-9)    


 


Eosinophils (%) (Auto) 1 % (0-3)    


 


Basophils (%) (Auto) 0 % (0-3)    


 


Neutrophils # (Auto)


 23.8 x10^3/uL


(1.8-7.7) 


 


 





 


Lymphocytes # (Auto)


 1.0 x10^3/uL


(1.0-4.8) 


 


 





 


Monocytes # (Auto)


 1.2 x10^3/uL


(0.0-1.1) 


 


 





 


Eosinophils # (Auto)


 0.3 x10^3/uL


(0.0-0.7) 


 


 





 


Basophils # (Auto)


 0.0 x10^3/uL


(0.0-0.2) 


 


 





 


Segmented Neutrophils % 73 % (35-66)    


 


Band Neutrophils % 19 % (0-9)    


 


Lymphocytes % 3 % (24-48)    


 


Monocytes % 1 % (0-10)    


 


Metamyelocytes % 3 % (0-0)    


 


Myelocytes % 1 % (0-0)    


 


Toxic Granulation Slight    


 


Dohle Bodies Few    


 


Platelet Estimate


 Adequate


(ADEQUATE) 


 


 





 


Sodium Level


 137 mmol/L


(136-145) 


 


 





 


Potassium Level


 3.6 mmol/L


(3.5-5.1) 


 


 





 


Chloride Level


 103 mmol/L


() 


 


 





 


Carbon Dioxide Level


 26 mmol/L


(21-32) 


 


 





 


Anion Gap 8 (6-14)    


 


Blood Urea Nitrogen


 50 mg/dL


(7-20) 


 


 





 


Creatinine


 2.7 mg/dL


(0.6-1.0) 


 


 





 


Estimated GFR


(Cockcroft-Gault) 18.7 


 


 


 





 


BUN/Creatinine Ratio 19 (6-20)    


 


Glucose Level


 270 mg/dL


(70-99) 


 


 





 


Calcium Level


 7.5 mg/dL


(8.5-10.1) 


 


 





 


Phosphorus Level


 2.3 mg/dL


(2.6-4.7) 


 


 





 


Magnesium Level


 2.3 mg/dL


(1.8-2.4) 


 


 





 


Total Bilirubin


 0.4 mg/dL


(0.2-1.0) 


 


 





 


Aspartate Amino Transf


(AST/SGOT) 27 U/L (15-37) 


 


 


 





 


Alanine Aminotransferase


(ALT/SGPT) 12 U/L (14-59) 


 


 


 





 


Alkaline Phosphatase


 82 U/L


() 


 


 





 


Total Protein


 4.3 g/dL


(6.4-8.2) 


 


 





 


Albumin


 1.4 g/dL


(3.4-5.0) 


 


 





 


Albumin/Globulin Ratio 0.5 (1.0-1.7)    


 


Lactic Acid Level


 


 1.4 mmol/L


(0.4-2.0) 


 





 


O2 Saturation   97 % (92-99)  


 


Arterial Blood pH


 


 


 7.45


(7.35-7.45) 





 


Arterial Blood pCO2 at


Patient Temp 


 


 32 mmHg


(35-46) 





 


Arterial Blood pO2 at Patient


Temp 


 


 99 mmHg


() 





 


Arterial Blood HCO3


 


 


 22 mmol/L


(21-28) 





 


Arterial Blood Base Excess


 


 


 -2 mmol/L


(-3-3) 





 


FiO2   40  








Laboratory Tests








Test


 3/24/20


16:30 3/25/20


00:02 3/25/20


00:55 3/25/20


06:00


 


Sodium Level


 139 mmol/L


(136-145) 


 139 mmol/L


(136-145) 137 mmol/L


(136-145)


 


Potassium Level


 3.7 mmol/L


(3.5-5.1) 


 3.7 mmol/L


(3.5-5.1) 3.6 mmol/L


(3.5-5.1)


 


Chloride Level


 103 mmol/L


() 


 103 mmol/L


() 103 mmol/L


()


 


Carbon Dioxide Level


 29 mmol/L


(21-32) 


 23 mmol/L


(21-32) 26 mmol/L


(21-32)


 


Anion Gap 7 (6-14)   13 (6-14)  8 (6-14) 


 


Blood Urea Nitrogen


 36 mg/dL


(7-20) 


 42 mg/dL


(7-20) 50 mg/dL


(7-20)


 


Creatinine


 2.1 mg/dL


(0.6-1.0) 


 2.2 mg/dL


(0.6-1.0) 2.7 mg/dL


(0.6-1.0)


 


Estimated GFR


(Cockcroft-Gault) 25.0 


 


 23.7 


 18.7 





 


Glucose Level


 280 mg/dL


(70-99) 


 274 mg/dL


(70-99) 270 mg/dL


(70-99)


 


Calcium Level


 7.2 mg/dL


(8.5-10.1) 


 7.7 mg/dL


(8.5-10.1) 7.5 mg/dL


(8.5-10.1)


 


Phosphorus Level


 2.5 mg/dL


(2.6-4.7) 


 2.4 mg/dL


(2.6-4.7) 2.3 mg/dL


(2.6-4.7)


 


Magnesium Level


 2.3 mg/dL


(1.8-2.4) 


 2.3 mg/dL


(1.8-2.4) 2.3 mg/dL


(1.8-2.4)


 


Glucose (Fingerstick)


 


 274 mg/dL


(70-99) 


 





 


White Blood Count


 


 


 


 26.3 x10^3/uL


(4.0-11.0)


 


Red Blood Count


 


 


 


 2.41 x10^6/uL


(3.50-5.40)


 


Hemoglobin


 


 


 


 7.8 g/dL


(12.0-15.5)


 


Hematocrit


 


 


 


 23.6 %


(36.0-47.0)


 


Mean Corpuscular Volume    98 fL () 


 


Mean Corpuscular Hemoglobin    33 pg (25-35) 


 


Mean Corpuscular Hemoglobin


Concent 


 


 


 33 g/dL


(31-37)


 


Red Cell Distribution Width


 


 


 


 13.4 %


(11.5-14.5)


 


Platelet Count


 


 


 


 292 x10^3/uL


(140-400)


 


Neutrophils (%) (Auto)    90 % (31-73) 


 


Lymphocytes (%) (Auto)    4 % (24-48) 


 


Monocytes (%) (Auto)    5 % (0-9) 


 


Eosinophils (%) (Auto)    1 % (0-3) 


 


Basophils (%) (Auto)    0 % (0-3) 


 


Neutrophils # (Auto)


 


 


 


 23.8 x10^3/uL


(1.8-7.7)


 


Lymphocytes # (Auto)


 


 


 


 1.0 x10^3/uL


(1.0-4.8)


 


Monocytes # (Auto)


 


 


 


 1.2 x10^3/uL


(0.0-1.1)


 


Eosinophils # (Auto)


 


 


 


 0.3 x10^3/uL


(0.0-0.7)


 


Basophils # (Auto)


 


 


 


 0.0 x10^3/uL


(0.0-0.2)


 


Segmented Neutrophils %    73 % (35-66) 


 


Band Neutrophils %    19 % (0-9) 


 


Lymphocytes %    3 % (24-48) 


 


Monocytes %    1 % (0-10) 


 


Metamyelocytes %    3 % (0-0) 


 


Myelocytes %    1 % (0-0) 


 


Toxic Granulation    Slight 


 


Dohle Bodies    Few 


 


Platelet Estimate


 


 


 


 Adequate


(ADEQUATE)


 


BUN/Creatinine Ratio    19 (6-20) 


 


Total Bilirubin


 


 


 


 0.4 mg/dL


(0.2-1.0)


 


Aspartate Amino Transf


(AST/SGOT) 


 


 


 27 U/L (15-37) 





 


Alanine Aminotransferase


(ALT/SGPT) 


 


 


 12 U/L (14-59) 





 


Alkaline Phosphatase


 


 


 


 82 U/L


()


 


Total Protein


 


 


 


 4.3 g/dL


(6.4-8.2)


 


Albumin


 


 


 


 1.4 g/dL


(3.4-5.0)


 


Albumin/Globulin Ratio    0.5 (1.0-1.7) 


 


Test


 3/25/20


08:00 3/25/20


08:55 


 





 


Lactic Acid Level


 1.4 mmol/L


(0.4-2.0) 


 


 





 


O2 Saturation  97 % (92-99)   


 


Arterial Blood pH


 


 7.45


(7.35-7.45) 


 





 


Arterial Blood pCO2 at


Patient Temp 


 32 mmHg


(35-46) 


 





 


Arterial Blood pO2 at Patient


Temp 


 99 mmHg


() 


 





 


Arterial Blood HCO3


 


 22 mmol/L


(21-28) 


 





 


Arterial Blood Base Excess


 


 -2 mmol/L


(-3-3) 


 





 


FiO2  40   








Medications





Active Scripts








 Medications  Dose


 Route/Sig


 Max Daily Dose Days Date Category


 


 Bisoprolol


 Fumarate 5 Mg


 Tablet  10 Mg


 PO DAILY


   3/16/20 Reported








Comments


3/25


cxr poor insp film


interstitial prominence/ left basal effusion





Impression


.


IMPRESSION:


1.  Acute hypoxemic respiratory failure secondary to acute pancreatitis, sepsis,

abdominal distention, and pneumonia.


2.  Gallstone pancreatitis. WITH NECROSIS


3.  Severe metabolic acidosis.


4.  Acute kidney injury. ON CRRT


5.  Acute gallstone pancreatitis.


6.  Hypoalbuminemia.


7.  Hypocalcemia.


8.  Leukocytosis


9.  Chronic anemia





Plan


.


AC mode , 100% FIO2/ 10 PEEP


ABG noted ( they are actually on 100%FIO2)


Poor prognosis/ ARDS


supportive care


CRRT


Repeat CT once stable


Antibiotics per ID


Follow surgery input


Follow nephrology input


Nutritional support with TPN


Prognosis is extremely poor


d/w RN/RT


cct 30 min











SHARYN SOLORZANO MD                 Mar 25, 2020 15:20

## 2020-03-25 NOTE — RAD
PORTABLE CHEST 1V

 

History: Central line placement

 

Comparison: March 25, 2020

 

Findings:

Right IJ central line with tip projecting over the cavoatrial junction. 

Left sided central line with tip projecting over the right atrium, 

unchanged. Unchanged right PICC. No pneumothorax. Stable endotracheal tube

and enteric tube.

 

Diffuse interstitial thickening with patchy bibasilar opacities, 

unchanged. Small layering left pleural effusion, unchanged. Low lung 

volumes.

 

Impression: 

1.  Right IJ central line tip projecting over the cavoatrial junction.

2.  Diffuse interstitial and alveolar opacities, unchanged.

3.  Small layering left pleural effusion.

 

Electronically signed by: Torrey Coyle DO (3/25/2020 11:14 AM) [id number]

## 2020-03-26 VITALS — DIASTOLIC BLOOD PRESSURE: 48 MMHG | SYSTOLIC BLOOD PRESSURE: 95 MMHG

## 2020-03-26 VITALS — DIASTOLIC BLOOD PRESSURE: 51 MMHG | SYSTOLIC BLOOD PRESSURE: 87 MMHG

## 2020-03-26 VITALS — SYSTOLIC BLOOD PRESSURE: 94 MMHG | DIASTOLIC BLOOD PRESSURE: 52 MMHG

## 2020-03-26 VITALS — SYSTOLIC BLOOD PRESSURE: 83 MMHG | DIASTOLIC BLOOD PRESSURE: 61 MMHG

## 2020-03-26 VITALS — DIASTOLIC BLOOD PRESSURE: 77 MMHG | SYSTOLIC BLOOD PRESSURE: 115 MMHG

## 2020-03-26 VITALS — DIASTOLIC BLOOD PRESSURE: 58 MMHG | SYSTOLIC BLOOD PRESSURE: 105 MMHG

## 2020-03-26 VITALS — DIASTOLIC BLOOD PRESSURE: 58 MMHG | SYSTOLIC BLOOD PRESSURE: 110 MMHG

## 2020-03-26 VITALS — DIASTOLIC BLOOD PRESSURE: 42 MMHG | SYSTOLIC BLOOD PRESSURE: 83 MMHG

## 2020-03-26 VITALS — DIASTOLIC BLOOD PRESSURE: 69 MMHG | SYSTOLIC BLOOD PRESSURE: 102 MMHG

## 2020-03-26 VITALS — DIASTOLIC BLOOD PRESSURE: 47 MMHG | SYSTOLIC BLOOD PRESSURE: 109 MMHG

## 2020-03-26 VITALS — DIASTOLIC BLOOD PRESSURE: 47 MMHG | SYSTOLIC BLOOD PRESSURE: 101 MMHG

## 2020-03-26 VITALS — DIASTOLIC BLOOD PRESSURE: 56 MMHG | SYSTOLIC BLOOD PRESSURE: 101 MMHG

## 2020-03-26 VITALS — DIASTOLIC BLOOD PRESSURE: 62 MMHG | SYSTOLIC BLOOD PRESSURE: 120 MMHG

## 2020-03-26 VITALS — DIASTOLIC BLOOD PRESSURE: 50 MMHG | SYSTOLIC BLOOD PRESSURE: 104 MMHG

## 2020-03-26 VITALS — DIASTOLIC BLOOD PRESSURE: 47 MMHG | SYSTOLIC BLOOD PRESSURE: 95 MMHG

## 2020-03-26 VITALS — DIASTOLIC BLOOD PRESSURE: 53 MMHG | SYSTOLIC BLOOD PRESSURE: 109 MMHG

## 2020-03-26 VITALS — SYSTOLIC BLOOD PRESSURE: 93 MMHG | DIASTOLIC BLOOD PRESSURE: 55 MMHG

## 2020-03-26 VITALS — DIASTOLIC BLOOD PRESSURE: 54 MMHG | SYSTOLIC BLOOD PRESSURE: 79 MMHG

## 2020-03-26 VITALS — SYSTOLIC BLOOD PRESSURE: 104 MMHG | DIASTOLIC BLOOD PRESSURE: 50 MMHG

## 2020-03-26 VITALS — SYSTOLIC BLOOD PRESSURE: 108 MMHG | DIASTOLIC BLOOD PRESSURE: 64 MMHG

## 2020-03-26 VITALS — SYSTOLIC BLOOD PRESSURE: 99 MMHG | DIASTOLIC BLOOD PRESSURE: 57 MMHG

## 2020-03-26 VITALS — DIASTOLIC BLOOD PRESSURE: 51 MMHG | SYSTOLIC BLOOD PRESSURE: 112 MMHG

## 2020-03-26 VITALS — SYSTOLIC BLOOD PRESSURE: 105 MMHG | DIASTOLIC BLOOD PRESSURE: 58 MMHG

## 2020-03-26 VITALS — SYSTOLIC BLOOD PRESSURE: 107 MMHG | DIASTOLIC BLOOD PRESSURE: 58 MMHG

## 2020-03-26 VITALS — DIASTOLIC BLOOD PRESSURE: 48 MMHG | SYSTOLIC BLOOD PRESSURE: 92 MMHG

## 2020-03-26 VITALS — DIASTOLIC BLOOD PRESSURE: 64 MMHG | SYSTOLIC BLOOD PRESSURE: 99 MMHG

## 2020-03-26 VITALS — DIASTOLIC BLOOD PRESSURE: 43 MMHG | SYSTOLIC BLOOD PRESSURE: 109 MMHG

## 2020-03-26 VITALS — DIASTOLIC BLOOD PRESSURE: 60 MMHG | SYSTOLIC BLOOD PRESSURE: 104 MMHG

## 2020-03-26 VITALS — DIASTOLIC BLOOD PRESSURE: 55 MMHG | SYSTOLIC BLOOD PRESSURE: 99 MMHG

## 2020-03-26 LAB
ALBUMIN SERPL-MCNC: 1.9 G/DL (ref 3.4–5)
ALBUMIN/GLOB SERPL: 0.8 {RATIO} (ref 1–1.7)
ALP SERPL-CCNC: 83 U/L (ref 46–116)
ALT SERPL-CCNC: 16 U/L (ref 14–59)
ANION GAP SERPL CALC-SCNC: 10 MMOL/L (ref 6–14)
ANION GAP SERPL CALC-SCNC: 12 MMOL/L (ref 6–14)
AST SERPL-CCNC: 37 U/L (ref 15–37)
BASE EXCESS ABG: -3 MMOL/L (ref -3–3)
BASOPHILS # BLD AUTO: 0.1 X10^3/UL (ref 0–0.2)
BASOPHILS NFR BLD: 0 % (ref 0–3)
BILIRUB SERPL-MCNC: 0.6 MG/DL (ref 0.2–1)
BUN SERPL-MCNC: 48 MG/DL (ref 7–20)
BUN SERPL-MCNC: 59 MG/DL (ref 7–20)
BUN/CREAT SERPL: 24 (ref 6–20)
CALCIUM SERPL-MCNC: 7.6 MG/DL (ref 8.5–10.1)
CALCIUM SERPL-MCNC: 7.7 MG/DL (ref 8.5–10.1)
CHLORIDE SERPL-SCNC: 103 MMOL/L (ref 98–107)
CHLORIDE SERPL-SCNC: 104 MMOL/L (ref 98–107)
CO2 SERPL-SCNC: 22 MMOL/L (ref 21–32)
CO2 SERPL-SCNC: 25 MMOL/L (ref 21–32)
CREAT SERPL-MCNC: 1.9 MG/DL (ref 0.6–1)
CREAT SERPL-MCNC: 2.5 MG/DL (ref 0.6–1)
EOSINOPHIL NFR BLD: 0.3 X10^3/UL (ref 0–0.7)
EOSINOPHIL NFR BLD: 1 % (ref 0–3)
ERYTHROCYTE [DISTWIDTH] IN BLOOD BY AUTOMATED COUNT: 13.8 % (ref 11.5–14.5)
GFR SERPLBLD BASED ON 1.73 SQ M-ARVRAT: 20.5 ML/MIN
GFR SERPLBLD BASED ON 1.73 SQ M-ARVRAT: 28.1 ML/MIN
GLOBULIN SER-MCNC: 2.4 G/DL (ref 2.2–3.8)
GLUCOSE SERPL-MCNC: 252 MG/DL (ref 70–99)
GLUCOSE SERPL-MCNC: 279 MG/DL (ref 70–99)
HCO3 BLDA-SCNC: 21 MMOL/L (ref 21–28)
HCT VFR BLD CALC: 19.3 % (ref 36–47)
HGB BLD-MCNC: 6.5 G/DL (ref 12–15.5)
INFLUENZA A PATIENT: NEGATIVE
INFLUENZA B PATIENT: NEGATIVE
INSPIRATION SETTING TIME VENT: (no result)
LYMPHOCYTES # BLD: 1.2 X10^3/UL (ref 1–4.8)
LYMPHOCYTES NFR BLD AUTO: 5 % (ref 24–48)
MCH RBC QN AUTO: 33 PG (ref 25–35)
MCHC RBC AUTO-ENTMCNC: 34 G/DL (ref 31–37)
MCV RBC AUTO: 98 FL (ref 79–100)
MONO #: 1 X10^3/UL (ref 0–1.1)
MONOCYTES NFR BLD: 4 % (ref 0–9)
NEUT #: 21.2 X10^3/UL (ref 1.8–7.7)
NEUTROPHILS NFR BLD AUTO: 89 % (ref 31–73)
PCO2 BLDA: 32 MMHG (ref 35–46)
PLATELET # BLD AUTO: 274 X10^3/UL (ref 140–400)
PO2 BLDA: 94 MMHG (ref 75–108)
POTASSIUM SERPL-SCNC: 3.8 MMOL/L (ref 3.5–5.1)
POTASSIUM SERPL-SCNC: 4.1 MMOL/L (ref 3.5–5.1)
PROT SERPL-MCNC: 4.3 G/DL (ref 6.4–8.2)
RBC # BLD AUTO: 1.97 X10^6/UL (ref 3.5–5.4)
SAO2 % BLDA: 96 % (ref 92–99)
SODIUM SERPL-SCNC: 137 MMOL/L (ref 136–145)
SODIUM SERPL-SCNC: 139 MMOL/L (ref 136–145)
WBC # BLD AUTO: 23.8 X10^3/UL (ref 4–11)

## 2020-03-26 RX ADMIN — METOPROLOL TARTRATE SCH MG: 5 INJECTION INTRAVENOUS at 11:32

## 2020-03-26 RX ADMIN — POTASSIUM CHLORIDE SCH MLS/HR: 2 INJECTION, SOLUTION, CONCENTRATE INTRAVENOUS at 22:06

## 2020-03-26 RX ADMIN — INSULIN LISPRO SCH UNITS: 100 INJECTION, SOLUTION INTRAVENOUS; SUBCUTANEOUS at 18:00

## 2020-03-26 RX ADMIN — ACETAMINOPHEN PRN MG: 650 SOLUTION ORAL at 00:39

## 2020-03-26 RX ADMIN — ACETAMINOPHEN PRN MG: 650 SOLUTION ORAL at 07:35

## 2020-03-26 RX ADMIN — AMIODARONE HYDROCHLORIDE PRN MLS/HR: 50 INJECTION, SOLUTION INTRAVENOUS at 01:42

## 2020-03-26 RX ADMIN — MIDAZOLAM HYDROCHLORIDE PRN MLS/HR: 5 INJECTION, SOLUTION INTRAMUSCULAR; INTRAVENOUS at 22:39

## 2020-03-26 RX ADMIN — HEPARIN SODIUM SCH UNIT: 5000 INJECTION, SOLUTION INTRAVENOUS; SUBCUTANEOUS at 22:00

## 2020-03-26 RX ADMIN — POTASSIUM CHLORIDE SCH MLS/HR: 2 INJECTION, SOLUTION, CONCENTRATE INTRAVENOUS at 14:33

## 2020-03-26 RX ADMIN — INSULIN LISPRO SCH UNITS: 100 INJECTION, SOLUTION INTRAVENOUS; SUBCUTANEOUS at 23:26

## 2020-03-26 RX ADMIN — POTASSIUM CHLORIDE SCH MLS/HR: 2 INJECTION, SOLUTION, CONCENTRATE INTRAVENOUS at 10:26

## 2020-03-26 RX ADMIN — Medication PRN EACH: at 10:00

## 2020-03-26 RX ADMIN — DEXTROSE SCH MLS/HR: 50 INJECTION, SOLUTION INTRAVENOUS at 08:28

## 2020-03-26 RX ADMIN — POTASSIUM CHLORIDE SCH MLS/HR: 2 INJECTION, SOLUTION, CONCENTRATE INTRAVENOUS at 18:41

## 2020-03-26 RX ADMIN — POTASSIUM CHLORIDE SCH MLS/HR: 2 INJECTION, SOLUTION, CONCENTRATE INTRAVENOUS at 14:43

## 2020-03-26 RX ADMIN — POTASSIUM CHLORIDE SCH MLS/HR: 2 INJECTION, SOLUTION, CONCENTRATE INTRAVENOUS at 03:38

## 2020-03-26 RX ADMIN — POTASSIUM CHLORIDE SCH MLS/HR: 2 INJECTION, SOLUTION, CONCENTRATE INTRAVENOUS at 14:32

## 2020-03-26 RX ADMIN — CEFEPIME SCH GM: 2 INJECTION, POWDER, FOR SOLUTION INTRAVENOUS at 08:28

## 2020-03-26 RX ADMIN — METOPROLOL TARTRATE SCH MG: 5 INJECTION INTRAVENOUS at 00:12

## 2020-03-26 RX ADMIN — INSULIN LISPRO SCH UNITS: 100 INJECTION, SOLUTION INTRAVENOUS; SUBCUTANEOUS at 05:35

## 2020-03-26 RX ADMIN — ACETAMINOPHEN PRN MG: 650 SUPPOSITORY RECTAL at 00:34

## 2020-03-26 RX ADMIN — MIDAZOLAM HYDROCHLORIDE PRN MLS/HR: 5 INJECTION, SOLUTION INTRAMUSCULAR; INTRAVENOUS at 11:23

## 2020-03-26 RX ADMIN — POTASSIUM CHLORIDE SCH MLS/HR: 2 INJECTION, SOLUTION, CONCENTRATE INTRAVENOUS at 14:31

## 2020-03-26 RX ADMIN — POTASSIUM CHLORIDE SCH MLS/HR: 2 INJECTION, SOLUTION, CONCENTRATE INTRAVENOUS at 10:25

## 2020-03-26 RX ADMIN — CEFEPIME SCH GM: 2 INJECTION, POWDER, FOR SOLUTION INTRAVENOUS at 21:03

## 2020-03-26 RX ADMIN — INSULIN LISPRO SCH UNITS: 100 INJECTION, SOLUTION INTRAVENOUS; SUBCUTANEOUS at 12:11

## 2020-03-26 RX ADMIN — ALBUMIN (HUMAN) PRN MLS/HR: 12.5 INJECTION, SOLUTION INTRAVENOUS at 14:20

## 2020-03-26 RX ADMIN — POTASSIUM CHLORIDE SCH MLS/HR: 2 INJECTION, SOLUTION, CONCENTRATE INTRAVENOUS at 10:27

## 2020-03-26 RX ADMIN — METOPROLOL TARTRATE SCH MG: 5 INJECTION INTRAVENOUS at 17:36

## 2020-03-26 RX ADMIN — POTASSIUM CHLORIDE SCH MLS/HR: 2 INJECTION, SOLUTION, CONCENTRATE INTRAVENOUS at 00:25

## 2020-03-26 RX ADMIN — HEPARIN SODIUM SCH UNIT: 5000 INJECTION, SOLUTION INTRAVENOUS; SUBCUTANEOUS at 05:35

## 2020-03-26 RX ADMIN — INSULIN LISPRO SCH UNITS: 100 INJECTION, SOLUTION INTRAVENOUS; SUBCUTANEOUS at 00:24

## 2020-03-26 RX ADMIN — PANTOPRAZOLE SODIUM SCH MG: 40 INJECTION, POWDER, FOR SOLUTION INTRAVENOUS at 08:21

## 2020-03-26 RX ADMIN — METOPROLOL TARTRATE SCH MG: 5 INJECTION INTRAVENOUS at 05:33

## 2020-03-26 RX ADMIN — MIDAZOLAM HYDROCHLORIDE PRN MLS/HR: 5 INJECTION, SOLUTION INTRAMUSCULAR; INTRAVENOUS at 04:56

## 2020-03-26 RX ADMIN — HEPARIN SODIUM SCH UNIT: 5000 INJECTION, SOLUTION INTRAVENOUS; SUBCUTANEOUS at 14:00

## 2020-03-26 RX ADMIN — METOPROLOL TARTRATE SCH MG: 5 INJECTION INTRAVENOUS at 23:33

## 2020-03-26 NOTE — RAD
EXAM: CHEST 1 VIEW 

 

History: Intubation 

 

COMPARISON: 3/25/2020

 

TECHNIQUE: Single portable radiograph of the chest

 

Findings/

impression:

 

The ET tube, feeding tube, right-sided PICC line, left-sided internal 

jugular line, dialysis catheter in place. Moderate prominent appearing 

bilateral interstitial lung markings likely congestive changes with small 

bilateral pleural effusions. 

 

 

 

 

Electronically signed by: Logan Duran MD (3/26/2020 7:25 AM) [id number]

## 2020-03-26 NOTE — NUR
Albumin ordered and initiated as a result of low blood pressure 63/57. Recheck after 5 
minutes was 76/52.

## 2020-03-26 NOTE — PDOC
Infectious Disease Note


Subjective


Subjective


Fever Tmax 102.7


Intubated FiO2 40% PEEP 8


On Levophed gtt this am - dose varies currently about 3 to 4 mics


TPN





Vital Sign


Vital Signs





Vital Signs








  Date Time  Temp Pulse Resp B/P (MAP) Pulse Ox O2 Delivery O2 Flow Rate FiO2


 


3/26/20 07:43     100 Ventilator  


 


3/26/20 06:00  119 25 104/50 (68)    


 


3/26/20 04:00 101.2       





 101.2       


 


3/25/20 17:59       6.0 











Physical Exam


PHYSICAL EXAM


GENERAL:Intubated/sedated


HEENT: Mild icterus.  Oral mucosa very dry.


NECK:  Supple. 


LUNGS:  Decreased breath sounds at the bases.


HEART:  S1, S2, tachycardia, 120s, regular 


ABDOMEN:  Distended, + BS. Rectal tube in place - soft


: [place]   


EXTREMITIES: Trace edema, no cyanosis - less mottled but and toes appear 

nonischemic.today - Rooke boots in place


DERMATOLOGIC:  Warm and dry.  No generalized rash.  


CENTRAL NERVOUS SYSTEM: Intubated - rhythmic movements of head


IV:  RIJ Temp HDC & RUE PICC - Left IJ





Labs


Lab





Laboratory Tests








Test


 3/25/20


08:55 3/25/20


15:00 3/25/20


17:38 3/25/20


21:00


 


O2 Saturation 97 % (92-99)    


 


Arterial Blood pH


 7.45


(7.35-7.45) 


 


 





 


Arterial Blood pCO2 at


Patient Temp 32 mmHg


(35-46) 


 


 





 


Arterial Blood pO2 at Patient


Temp 99 mmHg


() 


 


 





 


Arterial Blood HCO3


 22 mmol/L


(21-28) 


 


 





 


Arterial Blood Base Excess


 -2 mmol/L


(-3-3) 


 


 





 


FiO2 40    


 


Sodium Level


 


 141 mmol/L


(136-145) 


 138 mmol/L


(136-145)


 


Potassium Level


 


 3.8 mmol/L


(3.5-5.1) 


 3.7 mmol/L


(3.5-5.1)


 


Chloride Level


 


 104 mmol/L


() 


 104 mmol/L


()


 


Carbon Dioxide Level


 


 25 mmol/L


(21-32) 


 27 mmol/L


(21-32)


 


Anion Gap  12 (6-14)   7 (6-14) 


 


Blood Urea Nitrogen


 


 49 mg/dL


(7-20) 


 44 mg/dL


(7-20)


 


Creatinine


 


 2.5 mg/dL


(0.6-1.0) 


 2.0 mg/dL


(0.6-1.0)


 


Estimated GFR


(Cockcroft-Gault) 


 20.5 


 


 26.5 





 


Glucose Level


 


 290 mg/dL


(70-99) 


 195 mg/dL


(70-99)


 


Calcium Level


 


 7.4 mg/dL


(8.5-10.1) 


 7.5 mg/dL


(8.5-10.1)


 


Phosphorus Level


 


 3.9 mg/dL


(2.6-4.7) 


 3.6 mg/dL


(2.6-4.7)


 


Magnesium Level


 


 2.2 mg/dL


(1.8-2.4) 


 2.3 mg/dL


(1.8-2.4)


 


Glucose (Fingerstick)


 


 


 253 mg/dL


(70-99) 





 


Test


 3/26/20


00:20 3/26/20


05:20 3/26/20


05:32 3/26/20


07:45


 


Glucose (Fingerstick)


 210 mg/dL


(70-99) 


 269 mg/dL


(70-99) 





 


White Blood Count


 


 23.8 x10^3/uL


(4.0-11.0) 


 





 


Red Blood Count


 


 1.97 x10^6/uL


(3.50-5.40) 


 





 


Hemoglobin


 


 6.5 g/dL


(12.0-15.5) 


 





 


Hematocrit


 


 19.3 %


(36.0-47.0) 


 





 


Mean Corpuscular Volume  98 fL ()   


 


Mean Corpuscular Hemoglobin  33 pg (25-35)   


 


Mean Corpuscular Hemoglobin


Concent 


 34 g/dL


(31-37) 


 





 


Red Cell Distribution Width


 


 13.8 %


(11.5-14.5) 


 





 


Platelet Count


 


 274 x10^3/uL


(140-400) 


 





 


Neutrophils (%) (Auto)  89 % (31-73)   


 


Lymphocytes (%) (Auto)  5 % (24-48)   


 


Monocytes (%) (Auto)  4 % (0-9)   


 


Eosinophils (%) (Auto)  1 % (0-3)   


 


Basophils (%) (Auto)  0 % (0-3)   


 


Neutrophils # (Auto)


 


 21.2 x10^3/uL


(1.8-7.7) 


 





 


Lymphocytes # (Auto)


 


 1.2 x10^3/uL


(1.0-4.8) 


 





 


Monocytes # (Auto)


 


 1.0 x10^3/uL


(0.0-1.1) 


 





 


Eosinophils # (Auto)


 


 0.3 x10^3/uL


(0.0-0.7) 


 





 


Basophils # (Auto)


 


 0.1 x10^3/uL


(0.0-0.2) 


 





 


Sodium Level


 


 137 mmol/L


(136-145) 


 





 


Potassium Level


 


 3.8 mmol/L


(3.5-5.1) 


 





 


Chloride Level


 


 103 mmol/L


() 


 





 


Carbon Dioxide Level


 


 22 mmol/L


(21-32) 


 





 


Anion Gap  12 (6-14)   


 


Blood Urea Nitrogen


 


 59 mg/dL


(7-20) 


 





 


Creatinine


 


 2.5 mg/dL


(0.6-1.0) 


 





 


Estimated GFR


(Cockcroft-Gault) 


 20.5 


 


 





 


BUN/Creatinine Ratio  24 (6-20)   


 


Glucose Level


 


 279 mg/dL


(70-99) 


 





 


Calcium Level


 


 7.7 mg/dL


(8.5-10.1) 


 





 


Total Bilirubin


 


 0.6 mg/dL


(0.2-1.0) 


 





 


Aspartate Amino Transf


(AST/SGOT) 


 37 U/L (15-37) 


 


 





 


Alanine Aminotransferase


(ALT/SGPT) 


 16 U/L (14-59) 


 


 





 


Alkaline Phosphatase


 


 83 U/L


() 


 





 


Total Protein


 


 4.3 g/dL


(6.4-8.2) 


 





 


Albumin


 


 1.9 g/dL


(3.4-5.0) 


 





 


Albumin/Globulin Ratio  0.8 (1.0-1.7)   


 


O2 Saturation    96 % (92-99) 


 


Arterial Blood pH


 


 


 


 7.43


(7.35-7.45)


 


Arterial Blood pCO2 at


Patient Temp 


 


 


 32 mmHg


(35-46)


 


Arterial Blood pO2 at Patient


Temp 


 


 


 94 mmHg


()


 


Arterial Blood HCO3


 


 


 


 21 mmol/L


(21-28)


 


Arterial Blood Base Excess


 


 


 


 -3 mmol/L


(-3-3)


 


FiO2    40% 








Micro


STAT CXR and KUB reviewed on 3/23








Microbiology


3/18/20 Blood Culture - Final, Complete


          NO GROWTH AFTER 5 DAYS





Objective


Assessment


Fever - 102.7 currently ? reactive with pancreatitis vs ID - C-diff neg. S/p HD 

cath change with malfunction 3/25 - cults neg


Hypotension better- levophed started  am 3/23 down to about 3 to 4 mics


Leukocytosis - better


Anemia


? developing peripheral ischemia - better


Acute pancreatitis, early developing necrosis


Cholelithiasis


JED,Hyperkalemia, Metabolic acidosis on CRRT


Acute hypoxic resp failure, intubated


Hypocalcemia 


Prediabetes


HTN


3/25 Impression: Replacement of a right internal jugular temporary dialysis


catheter over a guidewire





Plan


Plan of Care





Consult Neurology


Abd U/S


Influenza screen


F/u Blood cults 3/24


CT ordered but on CRRT will need when stable


Add Flagyl and Micafungin 3/23


Will D/c Meropenem with fever and Leukocytosis Dose Cefepime and Dapto 3/25


Cont Zyvox (3/20)


f/u labs and cults


No surgical plans at this time





Electrolytes per primary





Critically ill





D/w nursing











RASHAWN ROSEN MD              Mar 26, 2020 09:04

## 2020-03-26 NOTE — PDOC2
NEUROLOGY CONSULT


Date of Admission


Date of Admission


DATE: 3/26/20 


TIME: 14:35





Reason for Consult


Reason for Consult:


Possible seizure





Referring Physician


Referring Physician:


Dr. Emma Franco





Source


Source:  Chart review





History of Present Illness


History of Present Illness


The patient is a 49-year-old right-handed female admitted 3 days ago with 

nausea, vomiting, abdominal pain, found to have severe acute gallstone 

pancreatitis.  She was having a CT this morning when she became unresponsive and

required intubation.  In the ICU she was observed to have some twitching 

movements of the head, but now she is heavily sedated.  Note that she is also on

pressor medications.  We do not know any prior history of stroke, seizure, or 

head injury, but family is unavailable, I did leave a message.  She is on CRRT 

for acute kidney failure.  She has been febrile.  She has had respiratory 

insufficiency since admission, but did not require intubation until today.





Past Medical History


Cardiovascular:  HTN, Hyperlipidemia


Endocrine:  Diabetes





Family History


Family History:  Other (unobtainable)





Social History


Social History


unobtainable





Current Medications


Current Medications





Current Medications


Sodium Chloride 1,000 ml @  1,000 mls/hr Q1H IV  Last administered on 3/16/20at 

03:00;  Start 3/16/20 at 03:00;  Stop 3/16/20 at 03:59;  Status DC


Ondansetron HCl (Zofran) 4 mg 1X  ONCE IVP  Last administered on 3/16/20at 

03:27;  Start 3/16/20 at 03:00;  Stop 3/16/20 at 03:01;  Status DC


Morphine Sulfate (Morphine Sulfate) 4 mg 1X  ONCE IV ;  Start 3/16/20 at 03:00; 

Stop 3/16/20 at 03:01;  Status Cancel


Ketorolac Tromethamine (Toradol 30mg Vial) 30 mg 1X  ONCE IV  Last administered 

on 3/16/20at 02:54;  Start 3/16/20 at 03:00;  Stop 3/16/20 at 03:01;  Status DC


Fentanyl Citrate (Fentanyl 2ml Vial) 25 mcg 1X  ONCE IVP  Last administered on 

3/16/20at 03:23;  Start 3/16/20 at 03:30;  Stop 3/16/20 at 03:31;  Status DC


Fentanyl Citrate (Fentanyl 2ml Vial) 100 mcg STK-MED ONCE .ROUTE ;  Start 

3/16/20 at 03:18;  Stop 3/16/20 at 03:18;  Status DC


Iohexol (Omnipaque 350 Mg/ml) 90 ml 1X  ONCE IV  Last administered on 3/16/20at 

03:25;  Start 3/16/20 at 03:30;  Stop 3/16/20 at 03:31;  Status DC


Info (CONTRAST GIVEN -- Rx MONITORING) 1 each PRN DAILY  PRN MC SEE COMMENTS;  

Start 3/16/20 at 03:30;  Stop 3/18/20 at 03:29;  Status DC


Hydromorphone HCl (Dilaudid) 0.5 mg 1X  ONCE IV  Last administered on 3/16/20at 

03:55;  Start 3/16/20 at 04:30;  Stop 3/16/20 at 04:32;  Status DC


Ondansetron HCl (Zofran) 4 mg PRN Q8HRS  PRN IV NAUSEA/VOMITING 1ST CHOICE;  

Start 3/16/20 at 05:00;  Stop 3/16/20 at 09:27;  Status DC


Morphine Sulfate (Morphine Sulfate) 2 mg PRN Q2HR  PRN IV SEVERE PAIN 7-10 Last 

administered on 3/17/20at 12:26;  Start 3/16/20 at 05:00;  Stop 3/17/20 at 

14:15;  Status DC


Sodium Chloride 1,000 ml @  125 mls/hr Q8H IV  Last administered on 3/16/20at 

20:56;  Start 3/16/20 at 05:00;  Stop 3/17/20 at 04:59;  Status DC


Hydromorphone HCl (Dilaudid) 0.5 mg PRN Q3HRS  PRN IV SEVERE PAIN 7-10 Last 

administered on 3/17/20at 10:06;  Start 3/16/20 at 05:00;  Stop 3/17/20 at 

12:01;  Status DC


Piperacillin Sod/ Tazobactam Sod 4.5 gm/Sodium Chloride 100 ml @  200 mls/hr 1X 

ONCE IV  Last administered on 3/16/20at 05:44;  Start 3/16/20 at 06:00;  Stop 

3/16/20 at 06:29;  Status DC


Ondansetron HCl (Zofran) 4 mg PRN Q4HRS  PRN IV NAUSEA/VOMITING 1ST CHOICE Last 

administered on 3/21/20at 16:15;  Start 3/16/20 at 09:30


Insulin Human Lispro (HumaLOG) 0-9 UNITS Q6HRS SQ  Last administered on 

3/26/20at 12:11;  Start 3/16/20 at 09:30


Dextrose (Dextrose 50%-Water Syringe) 12.5 gm PRN Q15MIN  PRN IV SEE COMMENTS;  

Start 3/16/20 at 09:30


Pantoprazole Sodium (PROTONIX VIAL for IV PUSH) 40 mg DAILYAC IVP  Last 

administered on 3/26/20at 08:21;  Start 3/16/20 at 11:30


Prochlorperazine Edisylate (Compazine) 10 mg PRN Q6HRS  PRN IV NAUSEA/VOMITING, 

2nd CHOICE Last administered on 3/17/20at 00:42;  Start 3/16/20 at 17:45


Atenolol (Tenormin) 100 mg DAILY PO ;  Start 3/17/20 at 09:00;  Stop 3/16/20 at 

20:08;  Status DC


Metoprolol Tartrate (Lopressor Vial) 2.5 mg Q6HRS IVP  Last administered on 

3/17/20at 05:51;  Start 3/16/20 at 20:15;  Stop 3/17/20 at 10:02;  Status DC


Metoprolol Tartrate (Lopressor Vial) 5 mg Q6HRS IVP  Last administered on 

3/26/20at 00:12;  Start 3/17/20 at 10:15


Hydromorphone HCl (Dilaudid) 1 mg PRN Q3HRS  PRN IV SEVERE PAIN 7-10 Last 

administered on 3/23/20at 05:13;  Start 3/17/20 at 12:00


Lidocaine HCl (Buffered Lidocaine 1%) 3 ml STK-MED ONCE .ROUTE ;  Start 3/17/20 

at 12:55;  Stop 3/17/20 at 12:56;  Status DC


Albumin Human 500 ml @  125 mls/hr 1X  ONCE IV  Last administered on 3/17/20at 

14:33;  Start 3/17/20 at 14:30;  Stop 3/17/20 at 18:32;  Status DC


Norepinephrine Bitartrate 8 mg/ Dextrose 258 ml @  17.299 mls/ hr CONT  PRN IV 

PER PROTOCOL Last administered on 3/26/20at 01:42;  Start 3/17/20 at 15:30


Sodium Chloride 1,000 ml @  125 mls/hr Q8H IV  Last administered on 3/17/20at 

21:04;  Start 3/17/20 at 16:00;  Stop 3/18/20 at 02:42;  Status DC


Albumin Human 500 ml @  125 mls/hr PRN BID  PRN IV After every 2L NSS & BP < 

90mm Last administered on 3/26/20at 14:20;  Start 3/17/20 at 16:00


Iohexol (Omnipaque 300 Mg/ml) 60 ml 1X  ONCE IV  Last administered on 3/17/20at 

17:20;  Start 3/17/20 at 17:00;  Stop 3/17/20 at 17:01;  Status DC


Info (CONTRAST GIVEN -- Rx MONITORING) 1 each PRN DAILY  PRN MC SEE COMMENTS;  

Start 3/17/20 at 17:00;  Stop 3/19/20 at 16:59;  Status DC


Meropenem 1 gm/ Sodium Chloride 100 ml @  200 mls/hr Q8HRS IV  Last administered

on 3/18/20at 05:45;  Start 3/17/20 at 20:00;  Stop 3/18/20 at 08:48;  Status DC


Furosemide (Lasix) 40 mg 1X  ONCE IVP  Last administered on 3/17/20at 22:12;  

Start 3/17/20 at 22:30;  Stop 3/17/20 at 22:31;  Status DC


Calcium Chloride 1000 mg/Sodium Chloride 110 ml @  220 mls/hr 1X  ONCE IV  Last 

administered on 3/17/20at 22:11;  Start 3/17/20 at 22:30;  Stop 3/17/20 at 

22:59;  Status DC


Albuterol Sulfate (Ventolin Neb Soln) 2.5 mg 1X  ONCE NEB  Last administered on 

3/18/20at 00:56;  Start 3/17/20 at 22:30;  Stop 3/17/20 at 22:31;  Status DC


Insulin Human Regular (HumuLIN R VIAL) 5 unit 1X  ONCE IV  Last administered on 

3/17/20at 22:14;  Start 3/17/20 at 22:30;  Stop 3/17/20 at 22:31;  Status DC


Magnesium Sulfate 50 ml @ 25 mls/hr 1X  ONCE IV  Last administered on 3/18/20at 

02:57;  Start 3/18/20 at 03:00;  Stop 3/18/20 at 04:59;  Status DC


Calcium Gluconate 1000 mg/Sodium Chloride 110 ml @  220 mls/hr 1X  ONCE IV  Last

administered on 3/18/20at 02:46;  Start 3/18/20 at 03:00;  Stop 3/18/20 at 

03:29;  Status DC


Sodium Chloride 1,000 ml @  200 mls/hr Q5H IV  Last administered on 3/18/20at 

02:46;  Start 3/18/20 at 03:00;  Stop 3/18/20 at 10:21;  Status DC


Calcium Gluconate 1000 mg/Sodium Chloride 110 ml @  220 mls/hr 1X  ONCE IV  Last

administered on 3/18/20at 03:21;  Start 3/18/20 at 03:30;  Stop 3/18/20 at 

03:59;  Status DC


Sodium Bicarbonate 50 meq/Sodium Chloride 1,050 ml @  75 mls/hr Q14H IV  Last 

administered on 3/22/20at 21:10;  Start 3/18/20 at 07:30;  Stop 3/23/20 at 

10:28;  Status DC


Calcium Gluconate 2000 mg/Sodium Chloride 120 ml @  220 mls/hr 1X  ONCE IV  Last

administered on 3/18/20at 09:05;  Start 3/18/20 at 07:30;  Stop 3/18/20 at 

08:02;  Status DC


Lidocaine HCl (Xylocaine-Mpf 1% 2ml Vial) 2 ml STK-MED ONCE .ROUTE ;  Start 

3/18/20 at 08:47;  Stop 3/18/20 at 08:47;  Status DC


Meropenem 500 mg/ Sodium Chloride 50 ml @  100 mls/hr Q12HR IV  Last 

administered on 3/23/20at 21:01;  Start 3/18/20 at 18:00;  Stop 3/24/20 at 

07:58;  Status DC


Lidocaine HCl (Buffered Lidocaine 1%) 3 ml STK-MED ONCE .ROUTE ;  Start 3/18/20 

at 09:46;  Stop 3/18/20 at 09:46;  Status DC


Lidocaine HCl (Buffered Lidocaine 1%) 6 ml 1X  ONCE INJ  Last administered on 

3/18/20at 10:26;  Start 3/18/20 at 10:15;  Stop 3/18/20 at 10:16;  Status DC


Info (Tpn Per Pharmacy) 1 each PRN DAILY  PRN MC SEE COMMENTS Last administered 

on 3/26/20at 10:00;  Start 3/18/20 at 12:00


Sodium Chloride 1,000 ml @  1,000 mls/hr Q1H PRN IV hypotension;  Start 3/18/20 

at 12:07;  Stop 3/18/20 at 18:06;  Status DC


Diphenhydramine HCl (Benadryl) 25 mg 1X PRN  PRN IV ITCHING;  Start 3/18/20 at 

12:15;  Stop 3/19/20 at 12:14;  Status DC


Diphenhydramine HCl (Benadryl) 25 mg 1X PRN  PRN IV ITCHING;  Start 3/18/20 at 

12:15;  Stop 3/19/20 at 12:14;  Status DC


Sodium Chloride 1,000 ml @  400 mls/hr Q2H30M PRN IV PATENCY;  Start 3/18/20 at 

12:07;  Stop 3/19/20 at 00:06;  Status DC


Info (PHARMACY MONITORING -- do not chart) 1 each PRN DAILY  PRN MC SEE 

COMMENTS;  Start 3/18/20 at 12:15;  Stop 3/20/20 at 08:13;  Status DC


Sodium Chloride 90 meq/Calcium Gluconate 10 meq/ Multivitamins 10 ml/Chromium/ 

Copper/Manganese/ Seleni/Zn 1 ml/ Total Parenteral Nutrition/Amino 

Acids/Dextrose/ Fat Emulsion Intravenous 55.005 ml  @ 2.292 mls/hr TPN  CONT IV 

;  Start 3/18/20 at 22:00;  Stop 3/18/20 at 12:33;  Status DC


Info (Tpn Per Pharmacy) 1 each PRN DAILY  PRN MC SEE COMMENTS;  Start 3/18/20 at

12:30;  Status UNV


Sodium Chloride 90 meq/Calcium Gluconate 10 meq/ Multivitamins 10 ml/Chromium/ 

Copper/Manganese/ Seleni/Zn 0.5 ml/ Total Parenteral Nutrition/Amino 

Acids/Dextrose/ Fat Emulsion Intravenous 1,512 ml @  63 mls/hr TPN  CONT IV  

Last administered on 3/18/20at 22:06;  Start 3/18/20 at 22:00;  Stop 3/19/20 at 

21:59;  Status DC


Calcium Carbonate/ Glycine (Tums) 500 mg PRN AFTMEALHC  PRN PO INDIGESTION;  

Start 3/18/20 at 17:45


Calcium Gluconate (Calcium Gluconate) 2,000 mg 1X  ONCE IVP  Last administered 

on 3/19/20at 02:19;  Start 3/19/20 at 02:15;  Stop 3/19/20 at 02:16;  Status DC


Calcium Chloride 3000 mg/Sodium Chloride 1,030 ml @  50 mls/hr [id number] IV  Last 

administered on 3/21/20at 02:17;  Start 3/19/20 at 08:00;  Stop 3/21/20 at 

15:23;  Status DC


Lorazepam (Ativan Inj) 1 mg PRN Q4HRS  PRN IVP ANXIETY / AGITATION Last 

administered on 3/23/20at 00:34;  Start 3/19/20 at 09:00


Sodium Chloride 1,000 ml @  1,000 mls/hr Q1H PRN IV hypotension;  Start 3/19/20 

at 08:56;  Stop 3/19/20 at 14:55;  Status DC


Albumin Human 200 ml @  200 mls/hr 1X PRN  PRN IV Hypotension;  Start 3/19/20 at

09:00;  Stop 3/19/20 at 14:59;  Status DC


Diphenhydramine HCl (Benadryl) 25 mg 1X PRN  PRN IV ITCHING;  Start 3/19/20 at 

09:00;  Stop 3/20/20 at 08:59;  Status DC


Diphenhydramine HCl (Benadryl) 25 mg 1X PRN  PRN IV ITCHING;  Start 3/19/20 at 

09:00;  Stop 3/20/20 at 08:59;  Status DC


Sodium Chloride 1,000 ml @  400 mls/hr Q2H30M PRN IV PATENCY;  Start 3/19/20 at 

08:56;  Stop 3/19/20 at 20:55;  Status DC


Info (PHARMACY MONITORING -- do not chart) 1 each PRN DAILY  PRN MC SEE 

COMMENTS;  Start 3/19/20 at 09:00;  Status UNV


Info (PHARMACY MONITORING -- do not chart) 1 each PRN DAILY  PRN MC SEE 

COMMENTS;  Start 3/19/20 at 09:00;  Stop 3/20/20 at 08:13;  Status DC


Digoxin (Lanoxin) 500 mcg 1X  ONCE IV  Last administered on 3/19/20at 10:04;  

Start 3/19/20 at 10:00;  Stop 3/19/20 at 10:01;  Status DC


Digoxin (Lanoxin) 125 mcg 1X  ONCE IV  Last administered on 3/19/20at 17:10;  

Start 3/19/20 at 18:00;  Stop 3/19/20 at 18:01;  Status DC


Magnesium Sulfate 100 ml @  25 mls/hr 1X  ONCE IV  Last administered on 

3/19/20at 12:48;  Start 3/19/20 at 13:00;  Stop 3/19/20 at 16:59;  Status DC


Sodium Chloride 90 meq/Magnesium Sulfate 10 meq/ Calcium Gluconate 20 meq/ 

Multivitamins 10 ml/Chromium/ Copper/Manganese/ Seleni/Zn 0.5 ml/ Total 

Parenteral Nutrition/Amino Acids/Dextrose/ Fat Emulsion Intravenous 1,512 ml @  

63 mls/hr TPN  CONT IV  Last administered on 3/19/20at 22:25;  Start 3/19/20 at 

22:00;  Stop 3/20/20 at 21:59;  Status DC


Sodium Chloride 1,000 ml @  1,000 mls/hr Q1H PRN IV hypotension;  Start 3/20/20 

at 08:05;  Stop 3/20/20 at 14:04;  Status DC


Albumin Human 200 ml @  200 mls/hr 1X  ONCE IV  Last administered on 3/20/20at 

08:57;  Start 3/20/20 at 08:15;  Stop 3/20/20 at 09:14;  Status DC


Diphenhydramine HCl (Benadryl) 25 mg 1X PRN  PRN IV ITCHING;  Start 3/20/20 at 

08:15;  Stop 3/21/20 at 08:14;  Status DC


Diphenhydramine HCl (Benadryl) 25 mg 1X PRN  PRN IV ITCHING;  Start 3/20/20 at 

08:15;  Stop 3/21/20 at 08:14;  Status DC


Sodium Chloride 1,000 ml @  400 mls/hr Q2H30M PRN IV PATENCY;  Start 3/20/20 at 

08:05;  Stop 3/20/20 at 20:04;  Status DC


Info (PHARMACY MONITORING -- do not chart) 1 each PRN DAILY  PRN MC SEE 

COMMENTS;  Start 3/20/20 at 08:15;  Stop 3/24/20 at 07:57;  Status DC


Sodium Chloride 90 meq/Potassium Chloride 15 meq/ Potassium Phosphate 10 mmol/ 

Magnesium Sulfate 10 meq/Calcium Gluconate 20 meq/ Multivitamins 10 ml/Chromium/

Copper/Manganese/ Seleni/Zn 0.5 ml/ Total Parenteral Nutrition/Amino 

Acids/Dextrose/ Fat Emulsion Intravenous 1,512 ml @  63 mls/hr TPN  CONT IV  

Last administered on 3/20/20at 21:01;  Start 3/20/20 at 22:00;  Stop 3/21/20 at 

21:59;  Status DC


Potassium Chloride/Water 100 ml @  100 mls/hr 1X  ONCE IV  Last administered on 

3/20/20at 14:09;  Start 3/20/20 at 14:00;  Stop 3/20/20 at 14:59;  Status DC


Benzocaine (Hurricaine One) 1 spray 1X  ONCE MM  Last administered on 3/20/20at 

16:38;  Start 3/20/20 at 14:30;  Stop 3/20/20 at 14:31;  Status DC


Lidocaine HCl (Glydo (Lidocaine) Jelly) 1 thomas 1X  ONCE MM  Last administered on 

3/20/20at 16:38;  Start 3/20/20 at 14:30;  Stop 3/20/20 at 14:31;  Status DC


Linezolid/Dextrose 300 ml @  300 mls/hr Q12HR IV  Last administered on 3/26/20at

10:24;  Start 3/20/20 at 20:00


Acetaminophen (Tylenol) 650 mg PRN Q6HRS  PRN PO MILD PAIN / TEMP;  Start 

3/21/20 at 03:30;  Stop 3/21/20 at 03:36;  Status DC


Acetaminophen (Tylenol) 650 mg PRN Q6HRS  PRN PEG MILD PAIN / TEMP Last 

administered on 3/26/20at 07:35;  Start 3/21/20 at 03:36


Sodium Chloride 1,000 ml @  1,000 mls/hr Q1H PRN IV hypotension;  Start 3/21/20 

at 07:50;  Stop 3/21/20 at 13:49;  Status DC


Albumin Human 200 ml @  200 mls/hr 1X PRN  PRN IV Hypotension;  Start 3/21/20 at

08:00;  Stop 3/21/20 at 13:59;  Status DC


Sodium Chloride (Normal Saline Flush) 10 ml 1X PRN  PRN IV AP catheter pack;  

Start 3/21/20 at 08:00;  Stop 3/22/20 at 07:59;  Status DC


Sodium Chloride (Normal Saline Flush) 10 ml 1X PRN  PRN IV  catheter pack;  

Start 3/21/20 at 08:00;  Stop 3/22/20 at 07:59;  Status DC


Sodium Chloride 1,000 ml @  400 mls/hr Q2H30M PRN IV PATENCY;  Start 3/21/20 at 

07:50;  Stop 3/21/20 at 19:49;  Status DC


Info (PHARMACY MONITORING -- do not chart) 1 each PRN DAILY  PRN MC SEE 

COMMENTS;  Start 3/21/20 at 08:00;  Status UNV


Info (PHARMACY MONITORING -- do not chart) 1 each PRN DAILY  PRN MC SEE 

COMMENTS;  Start 3/21/20 at 08:00;  Stop 3/23/20 at 08:25;  Status DC


Sodium Chloride 90 meq/Potassium Chloride 15 meq/ Potassium Phosphate 10 mmol/ 

Magnesium Sulfate 10 meq/Calcium Gluconate 20 meq/ Multivitamins 10 ml/Chromium/

Copper/Manganese/ Seleni/Zn 0.5 ml/ Total Parenteral Nutrition/Amino 

Acids/Dextrose/ Fat Emulsion Intravenous 1,512 ml @  63 mls/hr TPN  CONT IV  

Last administered on 3/21/20at 20:57;  Start 3/21/20 at 22:00;  Stop 3/22/20 at 

21:59;  Status DC


Sodium Chloride 90 meq/Potassium Chloride 15 meq/ Potassium Phosphate 15 mmol/ 

Magnesium Sulfate 10 meq/Calcium Gluconate 20 meq/ Multivitamins 10 ml/Chromium/

Copper/Manganese/ Seleni/Zn 0.5 ml/ Total Parenteral Nutrition/Amino 

Acids/Dextrose/ Fat Emulsion Intravenous 1,512 ml @  63 mls/hr TPN  CONT IV ;  

Start 3/22/20 at 22:00;  Stop 3/22/20 at 14:16;  Status DC


Sodium Chloride 90 meq/Potassium Chloride 15 meq/ Potassium Phosphate 15 mmol/ 

Magnesium Sulfate 10 meq/Calcium Gluconate 20 meq/ Multivitamins 10 ml/Chromium/

Copper/Manganese/ Seleni/Zn 0.5 ml/ Total Parenteral Nutrition/Amino 

Acids/Dextrose/ Fat Emulsion Intravenous 1,200 ml @  50 mls/hr TPN  CONT IV ;  

Start 3/22/20 at 22:00;  Stop 3/22/20 at 14:17;  Status DC


Sodium Chloride 90 meq/Potassium Chloride 15 meq/ Potassium Phosphate 10 mmol/ 

Magnesium Sulfate 10 meq/Calcium Gluconate 20 meq/ Multivitamins 10 ml/Chromium/

Copper/Manganese/ Seleni/Zn 0.5 ml/ Total Parenteral Nutrition/Amino 

Acids/Dextrose/ Fat Emulsion Intravenous 1,200 ml @  50 mls/hr TPN  CONT IV  

Last administered on 3/22/20at 23:29;  Start 3/22/20 at 22:00;  Stop 3/23/20 at 

21:59;  Status DC


Sodium Chloride 1,000 ml @  1,000 mls/hr Q1H PRN IV hypotension;  Start 3/23/20 

at 07:28;  Stop 3/23/20 at 13:27;  Status DC


Albumin Human 200 ml @  200 mls/hr 1X  ONCE IV  Last administered on 3/23/20at 

08:51;  Start 3/23/20 at 07:30;  Stop 3/23/20 at 08:29;  Status DC


Diphenhydramine HCl (Benadryl) 25 mg 1X PRN  PRN IV ITCHING;  Start 3/23/20 at 

07:30;  Stop 3/24/20 at 07:29;  Status DC


Diphenhydramine HCl (Benadryl) 25 mg 1X PRN  PRN IV ITCHING;  Start 3/23/20 at 

07:30;  Stop 3/24/20 at 07:29;  Status DC


Sodium Chloride 1,000 ml @  400 mls/hr Q2H30M PRN IV PATENCY;  Start 3/23/20 at 

07:28;  Stop 3/23/20 at 19:27;  Status DC


Info (PHARMACY MONITORING -- do not chart) 1 each PRN DAILY  PRN MC SEE 

COMMENTS;  Start 3/23/20 at 07:30


Metronidazole 100 ml @  100 mls/hr Q6HRS IV  Last administered on 3/26/20at 

11:57;  Start 3/23/20 at 08:30


Micafungin Sodium 100 mg/Dextrose 100 ml @  100 mls/hr Q24H IV  Last 

administered on 3/26/20at 08:28;  Start 3/23/20 at 09:00


Propofol 0 ml @ As Directed STK-MED ONCE IV ;  Start 3/23/20 at 07:53;  Stop 

3/23/20 at 07:53;  Status DC


Etomidate (Amidate) 20 mg STK-MED ONCE IV ;  Start 3/23/20 at 07:53;  Stop 3

/23/20 at 07:54;  Status DC


Midazolam HCl (Versed) 5 mg STK-MED ONCE .ROUTE ;  Start 3/23/20 at 07:57;  Stop

3/23/20 at 07:57;  Status DC


Fentanyl Citrate 30 ml @ 0 mls/hr CONT  PRN IV SEE PROTOCOL Last administered on

3/26/20at 08:52;  Start 3/23/20 at 08:15


Artificial Tears (Artificial Tears) 1 drop PRN Q1HR  PRN OU DRY EYE;  Start 

3/23/20 at 08:15


Midazolam HCl 50 mg/Sodium Chloride 50 ml @ 0 mls/hr CONT  PRN IV SEE PROTOCOL 

Last administered on 3/26/20at 11:23;  Start 3/23/20 at 08:15


Etomidate (Amidate) 8 mg 1X  ONCE IV  Last administered on 3/23/20at 08:33;  

Start 3/23/20 at 08:30;  Stop 3/23/20 at 08:31;  Status DC


Succinylcholine Chloride (Anectine) 120 mg 1X  ONCE IV  Last administered on 

3/23/20at 08:34;  Start 3/23/20 at 08:30;  Stop 3/23/20 at 08:31;  Status DC


Midazolam HCl (Versed) 5 mg 1X  ONCE IV ;  Start 3/23/20 at 08:30;  Stop 3/23/20

at 08:31;  Status DC


Potassium Chloride 15 meq/ Bicarbonate Dialysis Soln w/ out KCl 5,007.5 ml  @ 

1,000 mls/ hr Q5H1M IV  Last administered on 3/24/20at 11:11;  Start 3/23/20 at 

12:00;  Stop 3/24/20 at 11:15;  Status DC


Potassium Chloride 15 meq/ Bicarbonate Dialysis Soln w/ out KCl 5,007.5 ml  @ 

1,000 mls/ hr Q5H1M IV  Last administered on 3/24/20at 11:12;  Start 3/23/20 at 

12:00;  Stop 3/24/20 at 11:17;  Status DC


Potassium Chloride 15 meq/ Bicarbonate Dialysis Soln w/ out KCl 5,007.5 ml  @ 

1,000 mls/ hr Q5H1M IV  Last administered on 3/24/20at 11:11;  Start 3/23/20 at 

12:00;  Stop 3/24/20 at 11:19;  Status DC


Sodium Chloride 90 meq/Potassium Chloride 15 meq/ Potassium Phosphate 10 mmol/ 

Magnesium Sulfate 10 meq/Calcium Gluconate 20 meq/ Multivitamins 10 ml/Chromium/

Copper/Manganese/ Seleni/Zn 0.5 ml/ Total Parenteral Nutrition/Amino 

Acids/Dextrose/ Fat Emulsion Intravenous 1,400 ml @  58.333 mls/ hr TPN  CONT IV

 Last administered on 3/23/20at 21:42;  Start 3/23/20 at 22:00;  Stop 3/24/20 at

21:59;  Status DC


Heparin Sodium (Porcine) (Heparin Sodium) 5,000 unit Q8HRS SQ  Last administered

on 3/26/20at 05:35;  Start 3/23/20 at 15:00


Meropenem 500 mg/ Sodium Chloride 50 ml @  100 mls/hr Q6HRS IV  Last 

administered on 3/25/20at 06:00;  Start 3/24/20 at 09:00;  Stop 3/25/20 at 

07:29;  Status DC


Potassium Phosphate 20 mmol/ Sodium Chloride 106.6667 ml @  51.667 m... 1X  ONCE

IV  Last administered on 3/24/20at 11:22;  Start 3/24/20 at 10:15;  Stop 3/24/20

at 12:18;  Status DC


Acetaminophen (Tylenol Supp) 650 mg PRN Q6HRS  PRN OR MILD PAIN / TEMP Last 

administered on 3/24/20at 10:37;  Start 3/24/20 at 10:30


Potassium Chloride/Water 100 ml @  100 mls/hr Q1H IV  Last administered on 

3/24/20at 12:12;  Start 3/24/20 at 11:00;  Stop 3/24/20 at 12:59;  Status DC


Potassium Chloride 20 meq/ Bicarbonate Dialysis Soln w/ out KCl 5,010 ml @  

1,000 mls/hr Q5H1M IV  Last administered on 3/25/20at 08:48;  Start 3/24/20 at 

12:00;  Stop 3/25/20 at 13:03;  Status DC


Potassium Chloride 20 meq/ Bicarbonate Dialysis Soln w/ out KCl 5,010 ml @  

1,000 mls/hr Q5H1M IV  Last administered on 3/26/20at 14:33;  Start 3/24/20 at 

11:30


Potassium Chloride 20 meq/ Bicarbonate Dialysis Soln w/ out KCl 5,010 ml @  

1,000 mls/hr Q5H1M IV  Last administered on 3/26/20at 14:31;  Start 3/24/20 at 

11:30


Sodium Chloride 90 meq/Potassium Chloride 15 meq/ Potassium Phosphate 15 mmol/ 

Magnesium Sulfate 10 meq/Calcium Gluconate 15 meq/ Multivitamins 10 ml/Chromium/

Copper/Manganese/ Seleni/Zn 0.5 ml/ Total Parenteral Nutrition/Amino 

Acids/Dextrose/ Fat Emulsion Intravenous 1,400 ml @  58.333 mls/ hr TPN  CONT IV

 Last administered on 3/24/20at 22:17;  Start 3/24/20 at 22:00;  Stop 3/25/20 at

21:59;  Status DC


Cefepime HCl (Maxipime) 2 gm Q12HR IVP  Last administered on 3/26/20at 08:28;  

Start 3/25/20 at 09:00


Daptomycin 500 mg/ Sodium Chloride 50 ml @  100 mls/hr Q48H IV  Last 

administered on 3/25/20at 08:45;  Start 3/25/20 at 08:30


Lidocaine HCl (Buffered Lidocaine 1%) 3 ml 1X  ONCE INJ  Last administered on 

3/25/20at 10:27;  Start 3/25/20 at 10:30;  Stop 3/25/20 at 10:31;  Status DC


Potassium Phosphate 20 mmol/ Sodium Chloride 106.6667 ml @  51.667 m... 1X  ONCE

IV  Last administered on 3/25/20at 12:51;  Start 3/25/20 at 13:00;  Stop 3/25/20

at 15:03;  Status DC


Sodium Chloride 90 meq/Potassium Chloride 15 meq/ Potassium Phosphate 18 mmol/ M

agnesium Sulfate 8 meq/Calcium Gluconate 15 meq/ Multivitamins 10 ml/Chromium/ 

Copper/Manganese/ Seleni/Zn 0.5 ml/ Total Parenteral Nutrition/Amino 

Acids/Dextrose/ Fat Emulsion Intravenous 1,400 ml @  58.333 mls/ hr TPN  CONT IV

 Last administered on 3/25/20at 22:16;  Start 3/25/20 at 22:00;  Stop 3/26/20 at

21:59


Potassium Chloride 20 meq/ Bicarbonate Dialysis Soln w/ out KCl 5,010 ml @  

1,000 mls/hr Q5H1M IV  Last administered on 3/26/20at 14:32;  Start 3/25/20 at 

16:00


Multi-Ingred Cream/Lotion/Oil/ Oint (Artificial Tears Eye Ointment) 1 thomas PRN 

Q1HR  PRN OU DRY EYE;  Start 3/25/20 at 17:30


Sodium Chloride 90 meq/Potassium Chloride 15 meq/ Potassium Phosphate 18 mmol/ 

Magnesium Sulfate 8 meq/Calcium Gluconate 15 meq/ Multivitamins 10 ml/Chromium/ 

Copper/Manganese/ Seleni/Zn 0.5 ml/ Total Parenteral Nutrition/Amino 

Acids/Dextrose/ Fat Emulsion Intravenous 1,400 ml @  58.333 mls/ hr TPN  CONT IV

;  Start 3/26/20 at 22:00;  Stop 3/27/20 at 21:59


Albumin Human 500 ml @  125 mls/hr 1X  ONCE IV ;  Start 3/26/20 at 14:15;  Stop 

3/26/20 at 18:14





Active Scripts


Active


Reported


Bisoprolol Fumarate 5 Mg Tablet 10 Mg PO DAILY





Allergies


Allergies:  


Coded Allergies:  


     codeine (Verified  Allergy, Intermediate, rash, 3/16/20)





ROS


Review of System


Unobtainable





Physical Exam


Physical Examination


General:


Well-developed, well-nourished white female.  Some mottling of the skin


HEENT:


Normocephalic andatraumatic. Tympanic membranes clear.Temporal 

arteriespulsatile and nontender.Fundoscopic exam unremarkable


Neck:


Supple without bruit, no meningismus


Musculoskeletal:


Stability:see neurologic. Gait exam:see neurologic. Tone:see neurologi

c.Strength:see neurologic.


Neurological:


Mental Status:orientation, memory, attention span/concentration, language, fund

of knowledge: Intubated, sedated, on ventilator and pressors. Cranial 

Nerves:Pupils equal and reactive to light. There is no facial asymmetry. All 

other cranial related problems are negative except as mentioned 

before.Reflexes:1+ and symmetric with silent plantar responses. Motor:No 

response to pain. Coordination & gait:not testable. Sensory:not testable.





Vitals


VITALS





Vital Signs








  Date Time  Temp Pulse Resp B/P (MAP) Pulse Ox O2 Delivery O2 Flow Rate FiO2


 


3/26/20 14:00  94 26 109/43 (65) 100 Ventilator  


 


3/26/20 12:00 98.8       





 98.8       


 


3/25/20 17:59       6.0 











Labs


Labs





Laboratory Tests








Test


 3/24/20


16:30 3/25/20


00:02 3/25/20


00:55 3/25/20


06:00


 


Sodium Level


 139 mmol/L


(136-145) 


 139 mmol/L


(136-145) 137 mmol/L


(136-145)


 


Potassium Level


 3.7 mmol/L


(3.5-5.1) 


 3.7 mmol/L


(3.5-5.1) 3.6 mmol/L


(3.5-5.1)


 


Chloride Level


 103 mmol/L


() 


 103 mmol/L


() 103 mmol/L


()


 


Carbon Dioxide Level


 29 mmol/L


(21-32) 


 23 mmol/L


(21-32) 26 mmol/L


(21-32)


 


Anion Gap 7 (6-14)   13 (6-14)  8 (6-14) 


 


Blood Urea Nitrogen


 36 mg/dL


(7-20) 


 42 mg/dL


(7-20) 50 mg/dL


(7-20)


 


Creatinine


 2.1 mg/dL


(0.6-1.0) 


 2.2 mg/dL


(0.6-1.0) 2.7 mg/dL


(0.6-1.0)


 


Estimated GFR


(Cockcroft-Gault) 25.0 


 


 23.7 


 18.7 





 


Glucose Level


 280 mg/dL


(70-99) 


 274 mg/dL


(70-99) 270 mg/dL


(70-99)


 


Calcium Level


 7.2 mg/dL


(8.5-10.1) 


 7.7 mg/dL


(8.5-10.1) 7.5 mg/dL


(8.5-10.1)


 


Phosphorus Level


 2.5 mg/dL


(2.6-4.7) 


 2.4 mg/dL


(2.6-4.7) 2.3 mg/dL


(2.6-4.7)


 


Magnesium Level


 2.3 mg/dL


(1.8-2.4) 


 2.3 mg/dL


(1.8-2.4) 2.3 mg/dL


(1.8-2.4)


 


Glucose (Fingerstick)


 


 274 mg/dL


(70-99) 


 





 


White Blood Count


 


 


 


 26.3 x10^3/uL


(4.0-11.0)


 


Red Blood Count


 


 


 


 2.41 x10^6/uL


(3.50-5.40)


 


Hemoglobin


 


 


 


 7.8 g/dL


(12.0-15.5)


 


Hematocrit


 


 


 


 23.6 %


(36.0-47.0)


 


Mean Corpuscular Volume    98 fL () 


 


Mean Corpuscular Hemoglobin    33 pg (25-35) 


 


Mean Corpuscular Hemoglobin


Concent 


 


 


 33 g/dL


(31-37)


 


Red Cell Distribution Width


 


 


 


 13.4 %


(11.5-14.5)


 


Platelet Count


 


 


 


 292 x10^3/uL


(140-400)


 


Neutrophils (%) (Auto)    90 % (31-73) 


 


Lymphocytes (%) (Auto)    4 % (24-48) 


 


Monocytes (%) (Auto)    5 % (0-9) 


 


Eosinophils (%) (Auto)    1 % (0-3) 


 


Basophils (%) (Auto)    0 % (0-3) 


 


Neutrophils # (Auto)


 


 


 


 23.8 x10^3/uL


(1.8-7.7)


 


Lymphocytes # (Auto)


 


 


 


 1.0 x10^3/uL


(1.0-4.8)


 


Monocytes # (Auto)


 


 


 


 1.2 x10^3/uL


(0.0-1.1)


 


Eosinophils # (Auto)


 


 


 


 0.3 x10^3/uL


(0.0-0.7)


 


Basophils # (Auto)


 


 


 


 0.0 x10^3/uL


(0.0-0.2)


 


Segmented Neutrophils %    73 % (35-66) 


 


Band Neutrophils %    19 % (0-9) 


 


Lymphocytes %    3 % (24-48) 


 


Monocytes %    1 % (0-10) 


 


Metamyelocytes %    3 % (0-0) 


 


Myelocytes %    1 % (0-0) 


 


Toxic Granulation    Slight 


 


Dohle Bodies    Few 


 


Platelet Estimate


 


 


 


 Adequate


(ADEQUATE)


 


BUN/Creatinine Ratio    19 (6-20) 


 


Total Bilirubin


 


 


 


 0.4 mg/dL


(0.2-1.0)


 


Aspartate Amino Transf


(AST/SGOT) 


 


 


 27 U/L (15-37) 





 


Alanine Aminotransferase


(ALT/SGPT) 


 


 


 12 U/L (14-59) 





 


Alkaline Phosphatase


 


 


 


 82 U/L


()


 


Total Protein


 


 


 


 4.3 g/dL


(6.4-8.2)


 


Albumin


 


 


 


 1.4 g/dL


(3.4-5.0)


 


Albumin/Globulin Ratio    0.5 (1.0-1.7) 


 


Test


 3/25/20


08:00 3/25/20


08:55 3/25/20


15:00 3/25/20


17:38


 


Lactic Acid Level


 1.4 mmol/L


(0.4-2.0) 


 


 





 


O2 Saturation  97 % (92-99)   


 


Arterial Blood pH


 


 7.45


(7.35-7.45) 


 





 


Arterial Blood pCO2 at


Patient Temp 


 32 mmHg


(35-46) 


 





 


Arterial Blood pO2 at Patient


Temp 


 99 mmHg


() 


 





 


Arterial Blood HCO3


 


 22 mmol/L


(21-28) 


 





 


Arterial Blood Base Excess


 


 -2 mmol/L


(-3-3) 


 





 


FiO2  40   


 


Sodium Level


 


 


 141 mmol/L


(136-145) 





 


Potassium Level


 


 


 3.8 mmol/L


(3.5-5.1) 





 


Chloride Level


 


 


 104 mmol/L


() 





 


Carbon Dioxide Level


 


 


 25 mmol/L


(21-32) 





 


Anion Gap   12 (6-14)  


 


Blood Urea Nitrogen


 


 


 49 mg/dL


(7-20) 





 


Creatinine


 


 


 2.5 mg/dL


(0.6-1.0) 





 


Estimated GFR


(Cockcroft-Gault) 


 


 20.5 


 





 


Glucose Level


 


 


 290 mg/dL


(70-99) 





 


Calcium Level


 


 


 7.4 mg/dL


(8.5-10.1) 





 


Phosphorus Level


 


 


 3.9 mg/dL


(2.6-4.7) 





 


Magnesium Level


 


 


 2.2 mg/dL


(1.8-2.4) 





 


Glucose (Fingerstick)


 


 


 


 253 mg/dL


(70-99)


 


Test


 3/25/20


21:00 3/26/20


00:20 3/26/20


05:20 3/26/20


05:32


 


Sodium Level


 138 mmol/L


(136-145) 


 137 mmol/L


(136-145) 





 


Potassium Level


 3.7 mmol/L


(3.5-5.1) 


 3.8 mmol/L


(3.5-5.1) 





 


Chloride Level


 104 mmol/L


() 


 103 mmol/L


() 





 


Carbon Dioxide Level


 27 mmol/L


(21-32) 


 22 mmol/L


(21-32) 





 


Anion Gap 7 (6-14)   12 (6-14)  


 


Blood Urea Nitrogen


 44 mg/dL


(7-20) 


 59 mg/dL


(7-20) 





 


Creatinine


 2.0 mg/dL


(0.6-1.0) 


 2.5 mg/dL


(0.6-1.0) 





 


Estimated GFR


(Cockcroft-Gault) 26.5 


 


 20.5 


 





 


Glucose Level


 195 mg/dL


(70-99) 


 279 mg/dL


(70-99) 





 


Calcium Level


 7.5 mg/dL


(8.5-10.1) 


 7.7 mg/dL


(8.5-10.1) 





 


Phosphorus Level


 3.6 mg/dL


(2.6-4.7) 


 


 





 


Magnesium Level


 2.3 mg/dL


(1.8-2.4) 


 


 





 


Glucose (Fingerstick)


 


 210 mg/dL


(70-99) 


 269 mg/dL


(70-99)


 


White Blood Count


 


 


 23.8 x10^3/uL


(4.0-11.0) 





 


Red Blood Count


 


 


 1.97 x10^6/uL


(3.50-5.40) 





 


Hemoglobin


 


 


 6.5 g/dL


(12.0-15.5) 





 


Hematocrit


 


 


 19.3 %


(36.0-47.0) 





 


Mean Corpuscular Volume   98 fL ()  


 


Mean Corpuscular Hemoglobin   33 pg (25-35)  


 


Mean Corpuscular Hemoglobin


Concent 


 


 34 g/dL


(31-37) 





 


Red Cell Distribution Width


 


 


 13.8 %


(11.5-14.5) 





 


Platelet Count


 


 


 274 x10^3/uL


(140-400) 





 


Neutrophils (%) (Auto)   89 % (31-73)  


 


Lymphocytes (%) (Auto)   5 % (24-48)  


 


Monocytes (%) (Auto)   4 % (0-9)  


 


Eosinophils (%) (Auto)   1 % (0-3)  


 


Basophils (%) (Auto)   0 % (0-3)  


 


Neutrophils # (Auto)


 


 


 21.2 x10^3/uL


(1.8-7.7) 





 


Lymphocytes # (Auto)


 


 


 1.2 x10^3/uL


(1.0-4.8) 





 


Monocytes # (Auto)


 


 


 1.0 x10^3/uL


(0.0-1.1) 





 


Eosinophils # (Auto)


 


 


 0.3 x10^3/uL


(0.0-0.7) 





 


Basophils # (Auto)


 


 


 0.1 x10^3/uL


(0.0-0.2) 





 


BUN/Creatinine Ratio   24 (6-20)  


 


Total Bilirubin


 


 


 0.6 mg/dL


(0.2-1.0) 





 


Aspartate Amino Transf


(AST/SGOT) 


 


 37 U/L (15-37) 


 





 


Alanine Aminotransferase


(ALT/SGPT) 


 


 16 U/L (14-59) 


 





 


Alkaline Phosphatase


 


 


 83 U/L


() 





 


Total Protein


 


 


 4.3 g/dL


(6.4-8.2) 





 


Albumin


 


 


 1.9 g/dL


(3.4-5.0) 





 


Albumin/Globulin Ratio   0.8 (1.0-1.7)  


 


Test


 3/26/20


07:45 3/26/20


10:30 3/26/20


12:01 





 


O2 Saturation 96 % (92-99)    


 


Arterial Blood pH


 7.43


(7.35-7.45) 


 


 





 


Arterial Blood pCO2 at


Patient Temp 32 mmHg


(35-46) 


 


 





 


Arterial Blood pO2 at Patient


Temp 94 mmHg


() 


 


 





 


Arterial Blood HCO3


 21 mmol/L


(21-28) 


 


 





 


Arterial Blood Base Excess


 -3 mmol/L


(-3-3) 


 


 





 


FiO2 40%    


 


Influenza Type A Antigen


 


 Negative


(NEGATIVE) 


 





 


Influenza Type B Antigen


 


 Negative


(NEGATIVE) 


 





 


Glucose (Fingerstick)


 


 


 264 mg/dL


(70-99) 











Laboratory Tests








Test


 3/25/20


15:00 3/25/20


17:38 3/25/20


21:00 3/26/20


00:20


 


Sodium Level


 141 mmol/L


(136-145) 


 138 mmol/L


(136-145) 





 


Potassium Level


 3.8 mmol/L


(3.5-5.1) 


 3.7 mmol/L


(3.5-5.1) 





 


Chloride Level


 104 mmol/L


() 


 104 mmol/L


() 





 


Carbon Dioxide Level


 25 mmol/L


(21-32) 


 27 mmol/L


(21-32) 





 


Anion Gap 12 (6-14)   7 (6-14)  


 


Blood Urea Nitrogen


 49 mg/dL


(7-20) 


 44 mg/dL


(7-20) 





 


Creatinine


 2.5 mg/dL


(0.6-1.0) 


 2.0 mg/dL


(0.6-1.0) 





 


Estimated GFR


(Cockcroft-Gault) 20.5 


 


 26.5 


 





 


Glucose Level


 290 mg/dL


(70-99) 


 195 mg/dL


(70-99) 





 


Calcium Level


 7.4 mg/dL


(8.5-10.1) 


 7.5 mg/dL


(8.5-10.1) 





 


Phosphorus Level


 3.9 mg/dL


(2.6-4.7) 


 3.6 mg/dL


(2.6-4.7) 





 


Magnesium Level


 2.2 mg/dL


(1.8-2.4) 


 2.3 mg/dL


(1.8-2.4) 





 


Glucose (Fingerstick)


 


 253 mg/dL


(70-99) 


 210 mg/dL


(70-99)


 


Test


 3/26/20


05:20 3/26/20


05:32 3/26/20


07:45 3/26/20


10:30


 


White Blood Count


 23.8 x10^3/uL


(4.0-11.0) 


 


 





 


Red Blood Count


 1.97 x10^6/uL


(3.50-5.40) 


 


 





 


Hemoglobin


 6.5 g/dL


(12.0-15.5) 


 


 





 


Hematocrit


 19.3 %


(36.0-47.0) 


 


 





 


Mean Corpuscular Volume 98 fL ()    


 


Mean Corpuscular Hemoglobin 33 pg (25-35)    


 


Mean Corpuscular Hemoglobin


Concent 34 g/dL


(31-37) 


 


 





 


Red Cell Distribution Width


 13.8 %


(11.5-14.5) 


 


 





 


Platelet Count


 274 x10^3/uL


(140-400) 


 


 





 


Neutrophils (%) (Auto) 89 % (31-73)    


 


Lymphocytes (%) (Auto) 5 % (24-48)    


 


Monocytes (%) (Auto) 4 % (0-9)    


 


Eosinophils (%) (Auto) 1 % (0-3)    


 


Basophils (%) (Auto) 0 % (0-3)    


 


Neutrophils # (Auto)


 21.2 x10^3/uL


(1.8-7.7) 


 


 





 


Lymphocytes # (Auto)


 1.2 x10^3/uL


(1.0-4.8) 


 


 





 


Monocytes # (Auto)


 1.0 x10^3/uL


(0.0-1.1) 


 


 





 


Eosinophils # (Auto)


 0.3 x10^3/uL


(0.0-0.7) 


 


 





 


Basophils # (Auto)


 0.1 x10^3/uL


(0.0-0.2) 


 


 





 


Sodium Level


 137 mmol/L


(136-145) 


 


 





 


Potassium Level


 3.8 mmol/L


(3.5-5.1) 


 


 





 


Chloride Level


 103 mmol/L


() 


 


 





 


Carbon Dioxide Level


 22 mmol/L


(21-32) 


 


 





 


Anion Gap 12 (6-14)    


 


Blood Urea Nitrogen


 59 mg/dL


(7-20) 


 


 





 


Creatinine


 2.5 mg/dL


(0.6-1.0) 


 


 





 


Estimated GFR


(Cockcroft-Gault) 20.5 


 


 


 





 


BUN/Creatinine Ratio 24 (6-20)    


 


Glucose Level


 279 mg/dL


(70-99) 


 


 





 


Calcium Level


 7.7 mg/dL


(8.5-10.1) 


 


 





 


Total Bilirubin


 0.6 mg/dL


(0.2-1.0) 


 


 





 


Aspartate Amino Transf


(AST/SGOT) 37 U/L (15-37) 


 


 


 





 


Alanine Aminotransferase


(ALT/SGPT) 16 U/L (14-59) 


 


 


 





 


Alkaline Phosphatase


 83 U/L


() 


 


 





 


Total Protein


 4.3 g/dL


(6.4-8.2) 


 


 





 


Albumin


 1.9 g/dL


(3.4-5.0) 


 


 





 


Albumin/Globulin Ratio 0.8 (1.0-1.7)    


 


Glucose (Fingerstick)


 


 269 mg/dL


(70-99) 


 





 


O2 Saturation   96 % (92-99)  


 


Arterial Blood pH


 


 


 7.43


(7.35-7.45) 





 


Arterial Blood pCO2 at


Patient Temp 


 


 32 mmHg


(35-46) 





 


Arterial Blood pO2 at Patient


Temp 


 


 94 mmHg


() 





 


Arterial Blood HCO3


 


 


 21 mmol/L


(21-28) 





 


Arterial Blood Base Excess


 


 


 -3 mmol/L


(-3-3) 





 


FiO2   40%  


 


Influenza Type A Antigen


 


 


 


 Negative


(NEGATIVE)


 


Influenza Type B Antigen


 


 


 


 Negative


(NEGATIVE)


 


Test


 3/26/20


12:01 


 


 





 


Glucose (Fingerstick)


 264 mg/dL


(70-99) 


 


 














Assessment/Plan


Assessment/Plan


Impression:


Metabolic encephalopathy, she may have had a seizure this morning.  The question

is why did she suddenly become unresponsive.  Was this just respiratory failure?

 Did she have some sort of intracranial catastrophe?


Medical issues:  fever - 102.7, gallstone pancreatitis, hypotension related to 

sepsis, on levophed, leukocytosis, anemia, possible peripheral ischemia, acute 

kidney injury, hyperkalemia, metabolic acidosis on CRRT, acute hypoxic resp 

failure, intubated, hypocalcemia, prediabetes, hypertension history, 

salpingoitis, uterine fibroid, pain nausea.





Recommendation:


Unable to transfer for CT scan


I will check an EEG at the bedside, patient will need to be off sedation for a 

couple hours first, will probably wait until tomorrow


She should be adequately covered for seizures with her current sedating 

medications.


Check coronavirus titers.





Thank you for letting me help with the patient's care.











CLEMENTINA PANTOJA MD           Mar 26, 2020 14:50

## 2020-03-26 NOTE — PDOC
SUBJECTIVE


ROS


Intubated , On pressors





OBJECTIVE


Vital Signs





Vital Signs








  Date Time  Temp Pulse Resp B/P (MAP) Pulse Ox O2 Delivery O2 Flow Rate FiO2


 


3/26/20 09:33     100 Ventilator  


 


3/26/20 09:00  109 26 109/47 (67)    


 


3/26/20 08:00 102.7       





 102.7       


 


3/25/20 17:59       6.0 








I & 0











Intake and Output 


 


 3/26/20





 07:00


 


Intake Total 3030.6667 ml


 


Output Total 580 ml


 


Balance 2450.6667 ml


 


 


 


IV Total 3030.6667 ml


 


Output Urine Total 330 ml


 


Stool Total 50 ml


 


Gastric Drainage Total 200 ml











PHYSICAL EXAM


Physical Exam


GENERAL: Intubated on MV 


HEENT: Mild icterus, Intubated  


NECK:  Supple. 


LUNGS:  Decreased breath sounds at the bases.


HEART:  S1, S2, tachycardia, 110s,


ABDOMEN:  Distended, + BS. Rectal tube in place


: [place]  +


EXTREMITIES: Trace edema, no cyanosis - mottled.


DERMATOLOGIC:  Warm and dry.  No generalized rash.  


CENTRAL NERVOUS SYSTEM: Intubated  on MV 


RUE-PICC, RIJ/Temp HDC & LIJ





DIAGNOSIS/ASSESSMENT


Assessment & Plan


JED - ATN, oliguo-anuric, requiring  RRT


 intubated on 3/23 , currently on  pressors


initially on HD , Switched to CRRT 3/23 seen on CRRT, tolerating well (Temp HDC 

replaced today 2/2 nonfunctional ) 


Discussed with RN and Robert  





HyperKalemia- normal  





 Acidosis -  Bicarb Normal- on higher side ,  Dced  IV bicarb  on 3/23


Currently on  TPN with bicarb  





HypoCalcemia- Corrected Ca normal  





Hypoalbuminemia- severe  





HypOPhos -Replace as needed  





 Acute pancreatitis - with necrosis/infection, likely gallstone pancreatitis. 

GI, ID, and general surgery  following  





Calculus of gallbladder with acute cholecystitis without obstruction - with 

secondary pancreatitis. Lap naif is indicated, will need to await pancreatitis 

resolution





HTN - Hypotensive currently and on pressors  





Sepsis - with acute pancreatitis as end organ dysfunction, sign of infection, 

zyvox, merrem





Hypoxia with respiratory failure - intubated





Discussed renal  related issues with significant  other on the phone , he 

verbalized understanding





COMMENT/RELEVANT DATA


Meds





Current Medications








 Medications


  (Trade)  Dose


 Ordered  Sig/Yee  Start Time


 Stop Time Status Last Admin


Dose Admin


 


 Acetaminophen


  (Tylenol Supp)  650 mg  PRN Q6HRS  PRN  3/24/20 10:30


    3/24/20 10:37


650 MG


 


 Acetaminophen


  (Tylenol)  650 mg  PRN Q6HRS  PRN  3/21/20 03:36


    3/26/20 07:35


650 MG


 


 Albumin Human  200 ml @ 


 200 mls/hr  1X  ONCE  3/23/20 07:30


 3/23/20 08:29 DC 3/23/20 08:51


200 MLS/HR


 


 Albuterol Sulfate


  (Ventolin Neb


 Soln)  2.5 mg  1X  ONCE  3/17/20 22:30


 3/17/20 22:31 DC 3/18/20 00:56


2.5 MG


 


 Artificial Tears


  (Artificial


 Tears)  1 drop  PRN Q1HR  PRN  3/23/20 08:15


     





 


 Atenolol


  (Tenormin)  100 mg  DAILY  3/17/20 09:00


 3/16/20 20:08 DC  





 


 Benzocaine


  (Hurricaine One)  1 spray  1X  ONCE  3/20/20 14:30


 3/20/20 14:31 DC 3/20/20 16:38


1 SPRAY


 


 Calcium Carbonate/


 Glycine


  (Tums)  500 mg  PRN [id number]  PRN  3/18/20 17:45


     





 


 Calcium Chloride


 1000 mg/Sodium


 Chloride  110 ml @ 


 220 mls/hr  1X  ONCE  3/17/20 22:30


 3/17/20 22:59 DC 3/17/20 22:11


220 MLS/HR


 


 Calcium Chloride


 3000 mg/Sodium


 Chloride  1,030 ml @ 


 50 mls/hr  [id number]  3/19/20 08:00


 3/21/20 15:23 DC 3/21/20 02:17


50 MLS/HR


 


 Calcium Gluconate


  (Calcium


 Gluconate)  2,000 mg  1X  ONCE  3/19/20 02:15


 3/19/20 02:16 DC 3/19/20 02:19


2,000 MG


 


 Calcium Gluconate


 1000 mg/Sodium


 Chloride  110 ml @ 


 220 mls/hr  1X  ONCE  3/18/20 03:30


 3/18/20 03:59 DC 3/18/20 03:21


220 MLS/HR


 


 Calcium Gluconate


 2000 mg/Sodium


 Chloride  120 ml @ 


 220 mls/hr  1X  ONCE  3/18/20 07:30


 3/18/20 08:02 DC 3/18/20 09:05


220 MLS/HR


 


 Cefepime HCl


  (Maxipime)  2 gm  Q12HR  3/25/20 09:00


    3/26/20 08:28


2 GM


 


 Daptomycin 500 mg/


 Sodium Chloride  50 ml @ 


 100 mls/hr  Q48H  3/25/20 08:30


    3/25/20 08:45


100 MLS/HR


 


 Dextrose


  (Dextrose


 50%-Water Syringe)  12.5 gm  PRN Q15MIN  PRN  3/16/20 09:30


     





 


 Digoxin


  (Lanoxin)  125 mcg  1X  ONCE  3/19/20 18:00


 3/19/20 18:01 DC 3/19/20 17:10


125 MCG


 


 Diphenhydramine


 HCl


  (Benadryl)  25 mg  1X PRN  PRN  3/23/20 07:30


 3/24/20 07:29 DC  





 


 Etomidate


  (Amidate)  8 mg  1X  ONCE  3/23/20 08:30


 3/23/20 08:31 DC 3/23/20 08:33


8 MG


 


 Fentanyl Citrate  30 ml @ 0


 mls/hr  CONT  PRN  3/23/20 08:15


    3/26/20 08:52


3.75 MLS/HR


 


 Fentanyl Citrate


  (Fentanyl 2ml


 Vial)  100 mcg  STK-MED ONCE  3/16/20 03:18


 3/16/20 03:18 DC  





 


 Furosemide


  (Lasix)  40 mg  1X  ONCE  3/17/20 22:30


 3/17/20 22:31 DC 3/17/20 22:12


40 MG


 


 Heparin Sodium


  (Porcine)


  (Heparin Sodium)  5,000 unit  Q8HRS  3/23/20 15:00


    3/26/20 05:35


5,000 UNIT


 


 Hydromorphone HCl


  (Dilaudid)  1 mg  PRN Q3HRS  PRN  3/17/20 12:00


    3/23/20 05:13


1 MG


 


 Info


  (CONTRAST GIVEN


 -- Rx MONITORING)  1 each  PRN DAILY  PRN  3/17/20 17:00


 3/19/20 16:59 DC  





 


 Info


  (PHARMACY


 MONITORING -- do


 not chart)  1 each  PRN DAILY  PRN  3/23/20 07:30


     





 


 Info


  (Tpn Per


 Pharmacy)  1 each  PRN DAILY  PRN  3/18/20 12:30


   UNV  





 


 Insulin Human


 Lispro


  (HumaLOG)  0-9 UNITS  Q6HRS  3/16/20 09:30


    3/26/20 05:35


7 UNITS


 


 Insulin Human


 Regular


  (HumuLIN R VIAL)  5 unit  1X  ONCE  3/17/20 22:30


 3/17/20 22:31 DC 3/17/20 22:14


5 UNIT


 


 Iohexol


  (Omnipaque 300


 Mg/ml)  60 ml  1X  ONCE  3/17/20 17:00


 3/17/20 17:01 DC 3/17/20 17:20


60 ML


 


 Iohexol


  (Omnipaque 350


 Mg/ml)  90 ml  1X  ONCE  3/16/20 03:30


 3/16/20 03:31 DC 3/16/20 03:25


90 ML


 


 Ketorolac


 Tromethamine


  (Toradol 30mg


 Vial)  30 mg  1X  ONCE  3/16/20 03:00


 3/16/20 03:01 DC 3/16/20 02:54


30 MG


 


 Lidocaine HCl


  (Buffered


 Lidocaine 1%)  3 ml  1X  ONCE  3/25/20 10:30


 3/25/20 10:31 DC 3/25/20 10:27


3 ML


 


 Lidocaine HCl


  (Glydo


  (Lidocaine) Jelly)  1 thomas  1X  ONCE  3/20/20 14:30


 3/20/20 14:31 DC 3/20/20 16:38


1 THOMAS


 


 Lidocaine HCl


  (Xylocaine-Mpf


 1% 2ml Vial)  2 ml  STK-MED ONCE  3/18/20 08:47


 3/18/20 08:47 DC  





 


 Linezolid/Dextrose  300 ml @ 


 300 mls/hr  Q12HR  3/20/20 20:00


    3/25/20 21:03


300 MLS/HR


 


 Lorazepam


  (Ativan Inj)  1 mg  PRN Q4HRS  PRN  3/19/20 09:00


    3/23/20 00:34


1 MG


 


 Magnesium Sulfate  100 ml @ 


 25 mls/hr  1X  ONCE  3/19/20 13:00


 3/19/20 16:59 DC 3/19/20 12:48


25 MLS/HR


 


 Meropenem 1 gm/


 Sodium Chloride  100 ml @ 


 200 mls/hr  Q8HRS  3/17/20 20:00


 3/18/20 08:48 DC 3/18/20 05:45


200 MLS/HR


 


 Meropenem 500 mg/


 Sodium Chloride  50 ml @ 


 100 mls/hr  Q6HRS  3/24/20 09:00


 3/25/20 07:29 DC 3/25/20 06:00


100 MLS/HR


 


 Metoprolol


 Tartrate


  (Lopressor Vial)  5 mg  Q6HRS  3/17/20 10:15


    3/26/20 00:12


5 MG


 


 Metronidazole  100 ml @ 


 100 mls/hr  Q6HRS  3/23/20 08:30


    3/26/20 05:35


100 MLS/HR


 


 Micafungin Sodium


 100 mg/Dextrose  100 ml @ 


 100 mls/hr  Q24H  3/23/20 09:00


    3/26/20 08:28


100 MLS/HR


 


 Midazolam HCl


  (Versed)  5 mg  1X  ONCE  3/23/20 08:30


 3/23/20 08:31 DC  





 


 Midazolam HCl 50


 mg/Sodium Chloride  50 ml @ 0


 mls/hr  CONT  PRN  3/23/20 08:15


    3/26/20 04:56


7 MLS/HR


 


 Morphine Sulfate


  (Morphine


 Sulfate)  2 mg  PRN Q2HR  PRN  3/16/20 05:00


 3/17/20 14:15 DC 3/17/20 12:26


2 MG


 


 Multi-Ingred


 Cream/Lotion/Oil/


 Oint


  (Artificial


 Tears Eye


 Ointment)  1 thomas  PRN Q1HR  PRN  3/25/20 17:30


     





 


 Norepinephrine


 Bitartrate 8 mg/


 Dextrose  258 ml @ 


 17.299 mls/


 hr  CONT  PRN  3/17/20 15:30


    3/26/20 01:42


10.379 MLS/HR


 


 Ondansetron HCl


  (Zofran)  4 mg  PRN Q4HRS  PRN  3/16/20 09:30


    3/21/20 16:15


4 MG


 


 Pantoprazole


 Sodium


  (PROTONIX VIAL


 for IV PUSH)  40 mg  DAILYAC  3/16/20 11:30


    3/26/20 08:21


40 MG


 


 Piperacillin Sod/


 Tazobactam Sod


 4.5 gm/Sodium


 Chloride  100 ml @ 


 200 mls/hr  1X  ONCE  3/16/20 06:00


 3/16/20 06:29 DC 3/16/20 05:44


200 MLS/HR


 


 Potassium


 Chloride 15 meq/


 Bicarbonate


 Dialysis Soln w/


 out KCl  5,007.5 ml


  @ 1,000 mls/


 hr  Q5H1M  3/23/20 12:00


 3/24/20 11:19 DC 3/24/20 11:11


1,000 MLS/HR


 


 Potassium


 Chloride 20 meq/


 Bicarbonate


 Dialysis Soln w/


 out KCl  5,010 ml @ 


 1,000 mls/hr  Q5H1M  3/25/20 16:00


    3/25/20 21:29


1,000 MLS/HR


 


 Potassium


 Chloride/Water  100 ml @ 


 100 mls/hr  Q1H  3/24/20 11:00


 3/24/20 12:59 DC 3/24/20 12:12


100 MLS/HR


 


 Potassium


 Phosphate 20 mmol/


 Sodium Chloride  106.6667


 ml @ 


 51.667 m...  1X  ONCE  3/25/20 13:00


 3/25/20 15:03 DC 3/25/20 12:51


51.667 MLS/HR


 


 Prochlorperazine


 Edisylate


  (Compazine)  10 mg  PRN Q6HRS  PRN  3/16/20 17:45


    3/17/20 00:42


10 MG


 


 Propofol  0 ml @ As


 Directed  STK-MED ONCE  3/23/20 07:53


 3/23/20 07:53 DC  





 


 Sodium


 Bicarbonate 50


 meq/Sodium


 Chloride  1,050 ml @ 


 75 mls/hr  Q14H  3/18/20 07:30


 3/23/20 10:28 DC 3/22/20 21:10


75 MLS/HR


 


 Sodium Chloride


  (Normal Saline


 Flush)  10 ml  1X PRN  PRN  3/21/20 08:00


 3/22/20 07:59 DC  





 


 Sodium Chloride


 90 meq/Calcium


 Gluconate 10 meq/


 Multivitamins 10


 ml/Chromium/


 Copper/Manganese/


 Seleni/Zn 0.5 ml/


 Total Parenteral


 Nutrition/Amino


 Acids/Dextrose/


 Fat Emulsion


 Intravenous  1,512 ml @ 


 63 mls/hr  TPN  CONT  3/18/20 22:00


 3/19/20 21:59 DC 3/18/20 22:06


63 MLS/HR


 


 Sodium Chloride


 90 meq/Calcium


 Gluconate 10 meq/


 Multivitamins 10


 ml/Chromium/


 Copper/Manganese/


 Seleni/Zn 1 ml/


 Total Parenteral


 Nutrition/Amino


 Acids/Dextrose/


 Fat Emulsion


 Intravenous  55.005 ml


  @ 2.292


 mls/hr  TPN  CONT  3/18/20 22:00


 3/18/20 12:33 DC  





 


 Sodium Chloride


 90 meq/Magnesium


 Sulfate 10 meq/


 Calcium Gluconate


 20 meq/


 Multivitamins 10


 ml/Chromium/


 Copper/Manganese/


 Seleni/Zn 0.5 ml/


 Total Parenteral


 Nutrition/Amino


 Acids/Dextrose/


 Fat Emulsion


 Intravenous  1,512 ml @ 


 63 mls/hr  TPN  CONT  3/19/20 22:00


 3/20/20 21:59 DC 3/19/20 22:25


63 MLS/HR


 


 Sodium Chloride


 90 meq/Potassium


 Chloride 15 meq/


 Potassium


 Phosphate 10 mmol/


 Magnesium Sulfate


 10 meq/Calcium


 Gluconate 20 meq/


 Multivitamins 10


 ml/Chromium/


 Copper/Manganese/


 Seleni/Zn 0.5 ml/


 Total Parenteral


 Nutrition/Amino


 Acids/Dextrose/


 Fat Emulsion


 Intravenous  1,400 ml @ 


 58.333 mls/


 hr  TPN  CONT  3/23/20 22:00


 3/24/20 21:59 DC 3/23/20 21:42


58.333 MLS/HR


 


 Sodium Chloride


 90 meq/Potassium


 Chloride 15 meq/


 Potassium


 Phosphate 15 mmol/


 Magnesium Sulfate


 10 meq/Calcium


 Gluconate 15 meq/


 Multivitamins 10


 ml/Chromium/


 Copper/Manganese/


 Seleni/Zn 0.5 ml/


 Total Parenteral


 Nutrition/Amino


 Acids/Dextrose/


 Fat Emulsion


 Intravenous  1,400 ml @ 


 58.333 mls/


 hr  TPN  CONT  3/24/20 22:00


 3/25/20 21:59 DC 3/24/20 22:17


58.333 MLS/HR


 


 Sodium Chloride


 90 meq/Potassium


 Chloride 15 meq/


 Potassium


 Phosphate 15 mmol/


 Magnesium Sulfate


 10 meq/Calcium


 Gluconate 20 meq/


 Multivitamins 10


 ml/Chromium/


 Copper/Manganese/


 Seleni/Zn 0.5 ml/


 Total Parenteral


 Nutrition/Amino


 Acids/Dextrose/


 Fat Emulsion


 Intravenous  1,200 ml @ 


 50 mls/hr  TPN  CONT  3/22/20 22:00


 3/22/20 14:17 DC  





 


 Sodium Chloride


 90 meq/Potassium


 Chloride 15 meq/


 Potassium


 Phosphate 18 mmol/


 Magnesium Sulfate


 8 meq/Calcium


 Gluconate 15 meq/


 Multivitamins 10


 ml/Chromium/


 Copper/Manganese/


 Seleni/Zn 0.5 ml/


 Total Parenteral


 Nutrition/Amino


 Acids/Dextrose/


 Fat Emulsion


 Intravenous  1,400 ml @ 


 58.333 mls/


 hr  TPN  CONT  3/25/20 22:00


 3/26/20 21:59  3/25/20 22:16


58.333 MLS/HR


 


 Succinylcholine


 Chloride


  (Anectine)  120 mg  1X  ONCE  3/23/20 08:30


 3/23/20 08:31 DC 3/23/20 08:34


120 MG








Lab





Laboratory Tests








Test


 3/25/20


15:00 3/25/20


17:38 3/25/20


21:00 3/26/20


00:20


 


Sodium Level


 141 mmol/L


(136-145) 


 138 mmol/L


(136-145) 





 


Potassium Level


 3.8 mmol/L


(3.5-5.1) 


 3.7 mmol/L


(3.5-5.1) 





 


Chloride Level


 104 mmol/L


() 


 104 mmol/L


() 





 


Carbon Dioxide Level


 25 mmol/L


(21-32) 


 27 mmol/L


(21-32) 





 


Anion Gap 12 (6-14)   7 (6-14)  


 


Blood Urea Nitrogen


 49 mg/dL


(7-20) 


 44 mg/dL


(7-20) 





 


Creatinine


 2.5 mg/dL


(0.6-1.0) 


 2.0 mg/dL


(0.6-1.0) 





 


Estimated GFR


(Cockcroft-Gault) 20.5 


 


 26.5 


 





 


Glucose Level


 290 mg/dL


(70-99) 


 195 mg/dL


(70-99) 





 


Calcium Level


 7.4 mg/dL


(8.5-10.1) 


 7.5 mg/dL


(8.5-10.1) 





 


Phosphorus Level


 3.9 mg/dL


(2.6-4.7) 


 3.6 mg/dL


(2.6-4.7) 





 


Magnesium Level


 2.2 mg/dL


(1.8-2.4) 


 2.3 mg/dL


(1.8-2.4) 





 


Glucose (Fingerstick)


 


 253 mg/dL


(70-99) 


 210 mg/dL


(70-99)


 


Test


 3/26/20


05:20 3/26/20


05:32 3/26/20


07:45 





 


White Blood Count


 23.8 x10^3/uL


(4.0-11.0) 


 


 





 


Red Blood Count


 1.97 x10^6/uL


(3.50-5.40) 


 


 





 


Hemoglobin


 6.5 g/dL


(12.0-15.5) 


 


 





 


Hematocrit


 19.3 %


(36.0-47.0) 


 


 





 


Mean Corpuscular Volume 98 fL ()    


 


Mean Corpuscular Hemoglobin 33 pg (25-35)    


 


Mean Corpuscular Hemoglobin


Concent 34 g/dL


(31-37) 


 


 





 


Red Cell Distribution Width


 13.8 %


(11.5-14.5) 


 


 





 


Platelet Count


 274 x10^3/uL


(140-400) 


 


 





 


Neutrophils (%) (Auto) 89 % (31-73)    


 


Lymphocytes (%) (Auto) 5 % (24-48)    


 


Monocytes (%) (Auto) 4 % (0-9)    


 


Eosinophils (%) (Auto) 1 % (0-3)    


 


Basophils (%) (Auto) 0 % (0-3)    


 


Neutrophils # (Auto)


 21.2 x10^3/uL


(1.8-7.7) 


 


 





 


Lymphocytes # (Auto)


 1.2 x10^3/uL


(1.0-4.8) 


 


 





 


Monocytes # (Auto)


 1.0 x10^3/uL


(0.0-1.1) 


 


 





 


Eosinophils # (Auto)


 0.3 x10^3/uL


(0.0-0.7) 


 


 





 


Basophils # (Auto)


 0.1 x10^3/uL


(0.0-0.2) 


 


 





 


Sodium Level


 137 mmol/L


(136-145) 


 


 





 


Potassium Level


 3.8 mmol/L


(3.5-5.1) 


 


 





 


Chloride Level


 103 mmol/L


() 


 


 





 


Carbon Dioxide Level


 22 mmol/L


(21-32) 


 


 





 


Anion Gap 12 (6-14)    


 


Blood Urea Nitrogen


 59 mg/dL


(7-20) 


 


 





 


Creatinine


 2.5 mg/dL


(0.6-1.0) 


 


 





 


Estimated GFR


(Cockcroft-Gault) 20.5 


 


 


 





 


BUN/Creatinine Ratio 24 (6-20)    


 


Glucose Level


 279 mg/dL


(70-99) 


 


 





 


Calcium Level


 7.7 mg/dL


(8.5-10.1) 


 


 





 


Total Bilirubin


 0.6 mg/dL


(0.2-1.0) 


 


 





 


Aspartate Amino Transf


(AST/SGOT) 37 U/L (15-37) 


 


 


 





 


Alanine Aminotransferase


(ALT/SGPT) 16 U/L (14-59) 


 


 


 





 


Alkaline Phosphatase


 83 U/L


() 


 


 





 


Total Protein


 4.3 g/dL


(6.4-8.2) 


 


 





 


Albumin


 1.9 g/dL


(3.4-5.0) 


 


 





 


Albumin/Globulin Ratio 0.8 (1.0-1.7)    


 


Glucose (Fingerstick)


 


 269 mg/dL


(70-99) 


 





 


O2 Saturation   96 % (92-99)  


 


Arterial Blood pH


 


 


 7.43


(7.35-7.45) 





 


Arterial Blood pCO2 at


Patient Temp 


 


 32 mmHg


(35-46) 





 


Arterial Blood pO2 at Patient


Temp 


 


 94 mmHg


() 





 


Arterial Blood HCO3


 


 


 21 mmol/L


(21-28) 





 


Arterial Blood Base Excess


 


 


 -3 mmol/L


(-3-3) 





 


FiO2   40%  








Results


All relevant outside records, renal labs, imaging studies, telemetry/EKG's were 

reviewed.


Other





The ET tube, feeding tube, right-sided PICC line, left-sided internal 


jugular line, dialysis catheter in place. Moderate prominent appearing 


bilateral interstitial lung markings likely congestive changes with small 


bilateral pleural effusions.











KIMBERLY MYERS MD                Mar 26, 2020 10:10

## 2020-03-26 NOTE — PDOC
[facility] PROGRESS NOTE


Chief Complaint


Chief Complaint


Respiratory failure requiring intubation this morning


Severe Acute pancreatitis


Acute kidney failure now requiring dialysis


Salpingo--itis


Gallstones (Calculus of gallbladder with acute cholecystitis without 

obstruction)


HTN 


Leukocytosis 


Hypoxia


Uterine fibroid


Hypoxia with respiratory failure


Intractable pain


Intractable nausea





History of Present Illness


History of Present Illness


[id number]


Patient seen and examined in the ICU


She is extremely critically ill


Remains mechanically ventilated with assist control/25/450/40%


Also on continuous renal replacement therapy


Chart reviewed


Discussed with RN


She has TPN hanging


Also on pressors











[id number]


Patient seen and examined in the ICU


She is still requiring CRRT


On the vent with assist control but her FiO2 is down to 40% from yesterday


Slightly better but still very critically ill


Discussed with RN


Chart reviewed








[id number]


Patient seen and examined in the ICU


She remains extremely critically ill


On IV fentanyl IV Versed IV Doxy


On the vent


Assist-control/25/450/70%


She is critically ill


Discussed with RN


Chart reviewed


Patient is currently on CRRT as well








[id number]


Patient seen and examined in the ICU


She had to be intubated this morning


On assist-control 25/450/100% with 10 of PEEP and only satting 87%


She is extremely critically ill


I'm not sure if she will survive


Chart reviewed


Discussed with RN











Ms Tadeo is a 50yo F w/ PMHx HTN, prediabetes who presents the emergency room

complaints of abdominal pain. Patient described off and on 3 days. She states is

constant, described as a squeezing sensation in a band-like distribution. + 

nausea, vomiting.  She denies any fever or diarrhea.  Patient denies any 

abdominal surgical procedures.  She states is worse with movements, car ride.  

Pain initially was upper abdomen however now pretty much generalized.  Last 

bowel movement was 3/15/2020. Nothing makes her pain better.  Patient denies any

shortness of breath.  She does state the pain moves into her chest.  Denies any 

headache or visual changes.


Lipase 60890, , , Bilirubin 1.4.


CT abdomen confirms pancreatic inflammation, peripancreatic fluid and 

inflammatory changes around the pancreas consistent with pancreatitis. 

Cholelithiasis and 1.4cm uterine fibroid as well as possible left salpingitis. 

Admitted for further care


GI, General surgery, ID, Pulm consulted.





3/17: Overnight per report no urine output. Added dilaudid for pain, PICC placed

per IR. Renal US negative.Seen bedside in ICU, given 2L additional NSS and 

albumin infusion. Still hypotensive, started on levophed. Repeat CT abdomen with

necrosis. Updated her fiancee


3/18: Sats are only 87% on nasal cannula oxygen. Dialysis catheter per 

nephrology


3/19: She is now on BiPAP appears more ill, now on dialysis


3/20: Seen on BiPAP. Her mother and another family member are present and seemed

to be good support for her. Currently on dialysis. Appears critically ill


3/21: Overnight Tmax 101.7 , still on BiPAP FiO2 40%, still on low dose Levophed

gtt, TPN initiated. On dialysis





Overnight still febrile. Dialysis today. She wakes up and responds to pain. No 

CP.





Vitals/I&O


Vitals/I&O:





                                   Vital Signs








  Date Time  Temp Pulse Resp B/P (MAP) Pulse Ox O2 Delivery O2 Flow Rate FiO2


 


3/26/20 12:12     99 Ventilator  


 


3/26/20 11:00  6 24 112/51 (71)    


 


3/26/20 10:31 100.6       





 100.6       


 


3/25/20 17:59       6.0 














                                    I & O   


 


 3/25/20 3/25/20 3/26/20





 14:59 22:59 06:59


 


Intake Total 589 ml 1131.6667 ml 1310 ml


 


Output Total 95 ml 255 ml 210 ml


 


Balance 494 ml 876.6667 ml 1100 ml











Physical Exam


Physical Exam:


GENERAL:Intubated/sedated


HEENT: Mild icterus.  Oral mucosa very dry.


NECK:  Supple. 


LUNGS:  Decreased breath sounds at the bases.


HEART:  S1, S2, tachycardia, 120s, regular 


ABDOMEN:  Distended, + BS. Rectal tube in place - soft


: [place]   


EXTREMITIES: Trace edema, no cyanosis - less mottled but and toes appear 

nonischemic.today - Rooke boots in place


DERMATOLOGIC:  Warm and dry.  No generalized rash.  


CENTRAL NERVOUS SYSTEM: Intubated - rhythmic movements of head


IV:  RIJ Temp HDC & RUE PICC - Left IJ


General:  Other (sedated )


Heart:  Other (increased rate)


Lungs:  Crackles


Abdomen:  Soft


Extremities:  No edema, Other (SOME CLUBBING )


Skin:  Other (mottling noted to extremities )





Labs


Labs:





Laboratory Tests








Test


 3/25/20


15:00 3/25/20


17:38 3/25/20


21:00 3/26/20


00:20


 


Sodium Level


 141 mmol/L


(136-145) 


 138 mmol/L


(136-145) 





 


Potassium Level


 3.8 mmol/L


(3.5-5.1) 


 3.7 mmol/L


(3.5-5.1) 





 


Chloride Level


 104 mmol/L


() 


 104 mmol/L


() 





 


Carbon Dioxide Level


 25 mmol/L


(21-32) 


 27 mmol/L


(21-32) 





 


Anion Gap 12 (6-14)   7 (6-14)  


 


Blood Urea Nitrogen


 49 mg/dL


(7-20) 


 44 mg/dL


(7-20) 





 


Creatinine


 2.5 mg/dL


(0.6-1.0) 


 2.0 mg/dL


(0.6-1.0) 





 


Estimated GFR


(Cockcroft-Gault) 20.5 


 


 26.5 


 





 


Glucose Level


 290 mg/dL


(70-99) 


 195 mg/dL


(70-99) 





 


Calcium Level


 7.4 mg/dL


(8.5-10.1) 


 7.5 mg/dL


(8.5-10.1) 





 


Phosphorus Level


 3.9 mg/dL


(2.6-4.7) 


 3.6 mg/dL


(2.6-4.7) 





 


Magnesium Level


 2.2 mg/dL


(1.8-2.4) 


 2.3 mg/dL


(1.8-2.4) 





 


Glucose (Fingerstick)


 


 253 mg/dL


(70-99) 


 210 mg/dL


(70-99)


 


Test


 3/26/20


05:20 3/26/20


05:32 3/26/20


07:45 3/26/20


10:30


 


White Blood Count


 23.8 x10^3/uL


(4.0-11.0) 


 


 





 


Red Blood Count


 1.97 x10^6/uL


(3.50-5.40) 


 


 





 


Hemoglobin


 6.5 g/dL


(12.0-15.5) 


 


 





 


Hematocrit


 19.3 %


(36.0-47.0) 


 


 





 


Mean Corpuscular Volume 98 fL ()    


 


Mean Corpuscular Hemoglobin 33 pg (25-35)    


 


Mean Corpuscular Hemoglobin


Concent 34 g/dL


(31-37) 


 


 





 


Red Cell Distribution Width


 13.8 %


(11.5-14.5) 


 


 





 


Platelet Count


 274 x10^3/uL


(140-400) 


 


 





 


Neutrophils (%) (Auto) 89 % (31-73)    


 


Lymphocytes (%) (Auto) 5 % (24-48)    


 


Monocytes (%) (Auto) 4 % (0-9)    


 


Eosinophils (%) (Auto) 1 % (0-3)    


 


Basophils (%) (Auto) 0 % (0-3)    


 


Neutrophils # (Auto)


 21.2 x10^3/uL


(1.8-7.7) 


 


 





 


Lymphocytes # (Auto)


 1.2 x10^3/uL


(1.0-4.8) 


 


 





 


Monocytes # (Auto)


 1.0 x10^3/uL


(0.0-1.1) 


 


 





 


Eosinophils # (Auto)


 0.3 x10^3/uL


(0.0-0.7) 


 


 





 


Basophils # (Auto)


 0.1 x10^3/uL


(0.0-0.2) 


 


 





 


Sodium Level


 137 mmol/L


(136-145) 


 


 





 


Potassium Level


 3.8 mmol/L


(3.5-5.1) 


 


 





 


Chloride Level


 103 mmol/L


() 


 


 





 


Carbon Dioxide Level


 22 mmol/L


(21-32) 


 


 





 


Anion Gap 12 (6-14)    


 


Blood Urea Nitrogen


 59 mg/dL


(7-20) 


 


 





 


Creatinine


 2.5 mg/dL


(0.6-1.0) 


 


 





 


Estimated GFR


(Cockcroft-Gault) 20.5 


 


 


 





 


BUN/Creatinine Ratio 24 (6-20)    


 


Glucose Level


 279 mg/dL


(70-99) 


 


 





 


Calcium Level


 7.7 mg/dL


(8.5-10.1) 


 


 





 


Total Bilirubin


 0.6 mg/dL


(0.2-1.0) 


 


 





 


Aspartate Amino Transf


(AST/SGOT) 37 U/L (15-37) 


 


 


 





 


Alanine Aminotransferase


(ALT/SGPT) 16 U/L (14-59) 


 


 


 





 


Alkaline Phosphatase


 83 U/L


() 


 


 





 


Total Protein


 4.3 g/dL


(6.4-8.2) 


 


 





 


Albumin


 1.9 g/dL


(3.4-5.0) 


 


 





 


Albumin/Globulin Ratio 0.8 (1.0-1.7)    


 


Glucose (Fingerstick)


 


 269 mg/dL


(70-99) 


 





 


O2 Saturation   96 % (92-99)  


 


Arterial Blood pH


 


 


 7.43


(7.35-7.45) 





 


Arterial Blood pCO2 at


Patient Temp 


 


 32 mmHg


(35-46) 





 


Arterial Blood pO2 at Patient


Temp 


 


 94 mmHg


() 





 


Arterial Blood HCO3


 


 


 21 mmol/L


(21-28) 





 


Arterial Blood Base Excess


 


 


 -3 mmol/L


(-3-3) 





 


FiO2   40%  


 


Influenza Type A Antigen


 


 


 


 Negative


(NEGATIVE)


 


Influenza Type B Antigen


 


 


 


 Negative


(NEGATIVE)


 


Test


 3/26/20


12:01 


 


 





 


Glucose (Fingerstick)


 264 mg/dL


(70-99) 


 


 














Assessment and Plan


Assessmemt and Plan


Problems


Medical Problems:


(1) Acute pancreatitis


Status: Acute  





(2) Cholelithiasis


Status: Acute  





Respiratory failure requiring intubation this morning 


Severe Acute pancreatitis


Acute kidney failure now requiring dialysis


Salpingo--itis


Gallstones (Calculus of gallbladder with acute cholecystitis without 

obstruction)


HTN 


Leukocytosis 


Hypoxia


Uterine fibroid


Hypoxia with respiratory failure


Intractable pain


Intractable nausea











Plan


CRRT


Mechanical ventilation


Hemodialysis


BiPAP


ICU monitoring


Trend labs especially white count and lipase levels


We have consulted GI and general surgery


IV antibiotics


IV fluids


AK narcotics


When necessary anti-medics


Home meds if possible


DVT prophylaxis


Full code


She is critically ill


Total time 32 minutes





Comment


Review of Relevant


I have reviewed the following items josy (where applicable) has been applied.


Medications:





Current Medications








 Medications


  (Trade)  Dose


 Ordered  Sig/Yee


 Route


 PRN Reason  Start Time


 Stop Time Status Last Admin


Dose Admin


 


 Potassium


 Phosphate 20 mmol/


 Sodium Chloride  106.6667


 ml @ 


 51.667 m...  1X  ONCE


 IV


   3/25/20 13:00


 3/25/20 15:03 DC 3/25/20 12:51





 


 Sodium Chloride


 90 meq/Potassium


 Chloride 15 meq/


 Potassium


 Phosphate 18 mmol/


 Magnesium Sulfate


 8 meq/Calcium


 Gluconate 15 meq/


 Multivitamins 10


 ml/Chromium/


 Copper/Manganese/


 Seleni/Zn 0.5 ml/


 Total Parenteral


 Nutrition/Amino


 Acids/Dextrose/


 Fat Emulsion


 Intravenous  1,400 ml @ 


 58.333 mls/


 hr  TPN  CONT


 IV


   3/25/20 22:00


 3/26/20 21:59  3/25/20 22:16





 


 Potassium


 Chloride 20 meq/


 Bicarbonate


 Dialysis Soln w/


 out KCl  5,010 ml @ 


 1,000 mls/hr  Q5H1M


 IV


   3/25/20 16:00


    3/26/20 10:25














Hemodynamically unstable?:  Yes


Is patient in severe pain?:  Yes


Is NPO status required?:  Yes











HECTOR MASON III DO           Mar 26, 2020 12:20

## 2020-03-26 NOTE — NUR
here to see patient. Pointed out new rhythmic movement of patient's head that 
appears to be seizure-like activity. Consult for Neurology. Dr. Quinn's office notified.

## 2020-03-26 NOTE — PDOC
SAURAV NASH APRN 3/26/20 0930:


SURGICAL PROGRESS NOTE


Subjective


d/w nurse--fevers 102


on levo


Vital Signs





Vital Signs








  Date Time  Temp Pulse Resp B/P (MAP) Pulse Ox O2 Delivery O2 Flow Rate FiO2


 


3/26/20 09:00  109 26 109/47 (67) 100 Ventilator  


 


3/26/20 08:00 102.7       





 102.7       


 


3/25/20 17:59       6.0 








I&O











Intake and Output 


 


 3/26/20





 07:00


 


Intake Total 3030.6667 ml


 


Output Total 580 ml


 


Balance 2450.6667 ml


 


 


 


IV Total 3030.6667 ml


 


Output Urine Total 330 ml


 


Stool Total 50 ml


 


Gastric Drainage Total 200 ml








General:  Other (sedated )


HEENT:  Other (vent)


Abdomen:  Soft


Labs





Laboratory Tests








Test


 3/24/20


12:53 3/24/20


16:30 3/25/20


00:02 3/25/20


00:55


 


Glucose (Fingerstick)


 225 mg/dL


(70-99) 


 274 mg/dL


(70-99) 





 


Sodium Level


 


 139 mmol/L


(136-145) 


 139 mmol/L


(136-145)


 


Potassium Level


 


 3.7 mmol/L


(3.5-5.1) 


 3.7 mmol/L


(3.5-5.1)


 


Chloride Level


 


 103 mmol/L


() 


 103 mmol/L


()


 


Carbon Dioxide Level


 


 29 mmol/L


(21-32) 


 23 mmol/L


(21-32)


 


Anion Gap  7 (6-14)   13 (6-14) 


 


Blood Urea Nitrogen


 


 36 mg/dL


(7-20) 


 42 mg/dL


(7-20)


 


Creatinine


 


 2.1 mg/dL


(0.6-1.0) 


 2.2 mg/dL


(0.6-1.0)


 


Estimated GFR


(Cockcroft-Gault) 


 25.0 


 


 23.7 





 


Glucose Level


 


 280 mg/dL


(70-99) 


 274 mg/dL


(70-99)


 


Calcium Level


 


 7.2 mg/dL


(8.5-10.1) 


 7.7 mg/dL


(8.5-10.1)


 


Phosphorus Level


 


 2.5 mg/dL


(2.6-4.7) 


 2.4 mg/dL


(2.6-4.7)


 


Magnesium Level


 


 2.3 mg/dL


(1.8-2.4) 


 2.3 mg/dL


(1.8-2.4)


 


Test


 3/25/20


06:00 3/25/20


08:00 3/25/20


08:55 3/25/20


15:00


 


White Blood Count


 26.3 x10^3/uL


(4.0-11.0) 


 


 





 


Red Blood Count


 2.41 x10^6/uL


(3.50-5.40) 


 


 





 


Hemoglobin


 7.8 g/dL


(12.0-15.5) 


 


 





 


Hematocrit


 23.6 %


(36.0-47.0) 


 


 





 


Mean Corpuscular Volume 98 fL ()    


 


Mean Corpuscular Hemoglobin 33 pg (25-35)    


 


Mean Corpuscular Hemoglobin


Concent 33 g/dL


(31-37) 


 


 





 


Red Cell Distribution Width


 13.4 %


(11.5-14.5) 


 


 





 


Platelet Count


 292 x10^3/uL


(140-400) 


 


 





 


Neutrophils (%) (Auto) 90 % (31-73)    


 


Lymphocytes (%) (Auto) 4 % (24-48)    


 


Monocytes (%) (Auto) 5 % (0-9)    


 


Eosinophils (%) (Auto) 1 % (0-3)    


 


Basophils (%) (Auto) 0 % (0-3)    


 


Neutrophils # (Auto)


 23.8 x10^3/uL


(1.8-7.7) 


 


 





 


Lymphocytes # (Auto)


 1.0 x10^3/uL


(1.0-4.8) 


 


 





 


Monocytes # (Auto)


 1.2 x10^3/uL


(0.0-1.1) 


 


 





 


Eosinophils # (Auto)


 0.3 x10^3/uL


(0.0-0.7) 


 


 





 


Basophils # (Auto)


 0.0 x10^3/uL


(0.0-0.2) 


 


 





 


Segmented Neutrophils % 73 % (35-66)    


 


Band Neutrophils % 19 % (0-9)    


 


Lymphocytes % 3 % (24-48)    


 


Monocytes % 1 % (0-10)    


 


Metamyelocytes % 3 % (0-0)    


 


Myelocytes % 1 % (0-0)    


 


Toxic Granulation Slight    


 


Dohle Bodies Few    


 


Platelet Estimate


 Adequate


(ADEQUATE) 


 


 





 


Sodium Level


 137 mmol/L


(136-145) 


 


 141 mmol/L


(136-145)


 


Potassium Level


 3.6 mmol/L


(3.5-5.1) 


 


 3.8 mmol/L


(3.5-5.1)


 


Chloride Level


 103 mmol/L


() 


 


 104 mmol/L


()


 


Carbon Dioxide Level


 26 mmol/L


(21-32) 


 


 25 mmol/L


(21-32)


 


Anion Gap 8 (6-14)    12 (6-14) 


 


Blood Urea Nitrogen


 50 mg/dL


(7-20) 


 


 49 mg/dL


(7-20)


 


Creatinine


 2.7 mg/dL


(0.6-1.0) 


 


 2.5 mg/dL


(0.6-1.0)


 


Estimated GFR


(Cockcroft-Gault) 18.7 


 


 


 20.5 





 


BUN/Creatinine Ratio 19 (6-20)    


 


Glucose Level


 270 mg/dL


(70-99) 


 


 290 mg/dL


(70-99)


 


Calcium Level


 7.5 mg/dL


(8.5-10.1) 


 


 7.4 mg/dL


(8.5-10.1)


 


Phosphorus Level


 2.3 mg/dL


(2.6-4.7) 


 


 3.9 mg/dL


(2.6-4.7)


 


Magnesium Level


 2.3 mg/dL


(1.8-2.4) 


 


 2.2 mg/dL


(1.8-2.4)


 


Total Bilirubin


 0.4 mg/dL


(0.2-1.0) 


 


 





 


Aspartate Amino Transf


(AST/SGOT) 27 U/L (15-37) 


 


 


 





 


Alanine Aminotransferase


(ALT/SGPT) 12 U/L (14-59) 


 


 


 





 


Alkaline Phosphatase


 82 U/L


() 


 


 





 


Total Protein


 4.3 g/dL


(6.4-8.2) 


 


 





 


Albumin


 1.4 g/dL


(3.4-5.0) 


 


 





 


Albumin/Globulin Ratio 0.5 (1.0-1.7)    


 


Lactic Acid Level


 


 1.4 mmol/L


(0.4-2.0) 


 





 


O2 Saturation   97 % (92-99)  


 


Arterial Blood pH


 


 


 7.45


(7.35-7.45) 





 


Arterial Blood pCO2 at


Patient Temp 


 


 32 mmHg


(35-46) 





 


Arterial Blood pO2 at Patient


Temp 


 


 99 mmHg


() 





 


Arterial Blood HCO3


 


 


 22 mmol/L


(21-28) 





 


Arterial Blood Base Excess


 


 


 -2 mmol/L


(-3-3) 





 


FiO2   40  


 


Test


 3/25/20


17:38 3/25/20


21:00 3/26/20


00:20 3/26/20


05:20


 


Glucose (Fingerstick)


 253 mg/dL


(70-99) 


 210 mg/dL


(70-99) 





 


Sodium Level


 


 138 mmol/L


(136-145) 


 137 mmol/L


(136-145)


 


Potassium Level


 


 3.7 mmol/L


(3.5-5.1) 


 3.8 mmol/L


(3.5-5.1)


 


Chloride Level


 


 104 mmol/L


() 


 103 mmol/L


()


 


Carbon Dioxide Level


 


 27 mmol/L


(21-32) 


 22 mmol/L


(21-32)


 


Anion Gap  7 (6-14)   12 (6-14) 


 


Blood Urea Nitrogen


 


 44 mg/dL


(7-20) 


 59 mg/dL


(7-20)


 


Creatinine


 


 2.0 mg/dL


(0.6-1.0) 


 2.5 mg/dL


(0.6-1.0)


 


Estimated GFR


(Cockcroft-Gault) 


 26.5 


 


 20.5 





 


Glucose Level


 


 195 mg/dL


(70-99) 


 279 mg/dL


(70-99)


 


Calcium Level


 


 7.5 mg/dL


(8.5-10.1) 


 7.7 mg/dL


(8.5-10.1)


 


Phosphorus Level


 


 3.6 mg/dL


(2.6-4.7) 


 





 


Magnesium Level


 


 2.3 mg/dL


(1.8-2.4) 


 





 


White Blood Count


 


 


 


 23.8 x10^3/uL


(4.0-11.0)


 


Red Blood Count


 


 


 


 1.97 x10^6/uL


(3.50-5.40)


 


Hemoglobin


 


 


 


 6.5 g/dL


(12.0-15.5)


 


Hematocrit


 


 


 


 19.3 %


(36.0-47.0)


 


Mean Corpuscular Volume    98 fL () 


 


Mean Corpuscular Hemoglobin    33 pg (25-35) 


 


Mean Corpuscular Hemoglobin


Concent 


 


 


 34 g/dL


(31-37)


 


Red Cell Distribution Width


 


 


 


 13.8 %


(11.5-14.5)


 


Platelet Count


 


 


 


 274 x10^3/uL


(140-400)


 


Neutrophils (%) (Auto)    89 % (31-73) 


 


Lymphocytes (%) (Auto)    5 % (24-48) 


 


Monocytes (%) (Auto)    4 % (0-9) 


 


Eosinophils (%) (Auto)    1 % (0-3) 


 


Basophils (%) (Auto)    0 % (0-3) 


 


Neutrophils # (Auto)


 


 


 


 21.2 x10^3/uL


(1.8-7.7)


 


Lymphocytes # (Auto)


 


 


 


 1.2 x10^3/uL


(1.0-4.8)


 


Monocytes # (Auto)


 


 


 


 1.0 x10^3/uL


(0.0-1.1)


 


Eosinophils # (Auto)


 


 


 


 0.3 x10^3/uL


(0.0-0.7)


 


Basophils # (Auto)


 


 


 


 0.1 x10^3/uL


(0.0-0.2)


 


BUN/Creatinine Ratio    24 (6-20) 


 


Total Bilirubin


 


 


 


 0.6 mg/dL


(0.2-1.0)


 


Aspartate Amino Transf


(AST/SGOT) 


 


 


 37 U/L (15-37) 





 


Alanine Aminotransferase


(ALT/SGPT) 


 


 


 16 U/L (14-59) 





 


Alkaline Phosphatase


 


 


 


 83 U/L


()


 


Total Protein


 


 


 


 4.3 g/dL


(6.4-8.2)


 


Albumin


 


 


 


 1.9 g/dL


(3.4-5.0)


 


Albumin/Globulin Ratio    0.8 (1.0-1.7) 


 


Test


 3/26/20


05:32 3/26/20


07:45 


 





 


Glucose (Fingerstick)


 269 mg/dL


(70-99) 


 


 





 


O2 Saturation  96 % (92-99)   


 


Arterial Blood pH


 


 7.43


(7.35-7.45) 


 





 


Arterial Blood pCO2 at


Patient Temp 


 32 mmHg


(35-46) 


 





 


Arterial Blood pO2 at Patient


Temp 


 94 mmHg


() 


 





 


Arterial Blood HCO3


 


 21 mmol/L


(21-28) 


 





 


Arterial Blood Base Excess


 


 -3 mmol/L


(-3-3) 


 





 


FiO2  40%   








Laboratory Tests








Test


 3/25/20


15:00 3/25/20


17:38 3/25/20


21:00 3/26/20


00:20


 


Sodium Level


 141 mmol/L


(136-145) 


 138 mmol/L


(136-145) 





 


Potassium Level


 3.8 mmol/L


(3.5-5.1) 


 3.7 mmol/L


(3.5-5.1) 





 


Chloride Level


 104 mmol/L


() 


 104 mmol/L


() 





 


Carbon Dioxide Level


 25 mmol/L


(21-32) 


 27 mmol/L


(21-32) 





 


Anion Gap 12 (6-14)   7 (6-14)  


 


Blood Urea Nitrogen


 49 mg/dL


(7-20) 


 44 mg/dL


(7-20) 





 


Creatinine


 2.5 mg/dL


(0.6-1.0) 


 2.0 mg/dL


(0.6-1.0) 





 


Estimated GFR


(Cockcroft-Gault) 20.5 


 


 26.5 


 





 


Glucose Level


 290 mg/dL


(70-99) 


 195 mg/dL


(70-99) 





 


Calcium Level


 7.4 mg/dL


(8.5-10.1) 


 7.5 mg/dL


(8.5-10.1) 





 


Phosphorus Level


 3.9 mg/dL


(2.6-4.7) 


 3.6 mg/dL


(2.6-4.7) 





 


Magnesium Level


 2.2 mg/dL


(1.8-2.4) 


 2.3 mg/dL


(1.8-2.4) 





 


Glucose (Fingerstick)


 


 253 mg/dL


(70-99) 


 210 mg/dL


(70-99)


 


Test


 3/26/20


05:20 3/26/20


05:32 3/26/20


07:45 





 


White Blood Count


 23.8 x10^3/uL


(4.0-11.0) 


 


 





 


Red Blood Count


 1.97 x10^6/uL


(3.50-5.40) 


 


 





 


Hemoglobin


 6.5 g/dL


(12.0-15.5) 


 


 





 


Hematocrit


 19.3 %


(36.0-47.0) 


 


 





 


Mean Corpuscular Volume 98 fL ()    


 


Mean Corpuscular Hemoglobin 33 pg (25-35)    


 


Mean Corpuscular Hemoglobin


Concent 34 g/dL


(31-37) 


 


 





 


Red Cell Distribution Width


 13.8 %


(11.5-14.5) 


 


 





 


Platelet Count


 274 x10^3/uL


(140-400) 


 


 





 


Neutrophils (%) (Auto) 89 % (31-73)    


 


Lymphocytes (%) (Auto) 5 % (24-48)    


 


Monocytes (%) (Auto) 4 % (0-9)    


 


Eosinophils (%) (Auto) 1 % (0-3)    


 


Basophils (%) (Auto) 0 % (0-3)    


 


Neutrophils # (Auto)


 21.2 x10^3/uL


(1.8-7.7) 


 


 





 


Lymphocytes # (Auto)


 1.2 x10^3/uL


(1.0-4.8) 


 


 





 


Monocytes # (Auto)


 1.0 x10^3/uL


(0.0-1.1) 


 


 





 


Eosinophils # (Auto)


 0.3 x10^3/uL


(0.0-0.7) 


 


 





 


Basophils # (Auto)


 0.1 x10^3/uL


(0.0-0.2) 


 


 





 


Sodium Level


 137 mmol/L


(136-145) 


 


 





 


Potassium Level


 3.8 mmol/L


(3.5-5.1) 


 


 





 


Chloride Level


 103 mmol/L


() 


 


 





 


Carbon Dioxide Level


 22 mmol/L


(21-32) 


 


 





 


Anion Gap 12 (6-14)    


 


Blood Urea Nitrogen


 59 mg/dL


(7-20) 


 


 





 


Creatinine


 2.5 mg/dL


(0.6-1.0) 


 


 





 


Estimated GFR


(Cockcroft-Gault) 20.5 


 


 


 





 


BUN/Creatinine Ratio 24 (6-20)    


 


Glucose Level


 279 mg/dL


(70-99) 


 


 





 


Calcium Level


 7.7 mg/dL


(8.5-10.1) 


 


 





 


Total Bilirubin


 0.6 mg/dL


(0.2-1.0) 


 


 





 


Aspartate Amino Transf


(AST/SGOT) 37 U/L (15-37) 


 


 


 





 


Alanine Aminotransferase


(ALT/SGPT) 16 U/L (14-59) 


 


 


 





 


Alkaline Phosphatase


 83 U/L


() 


 


 





 


Total Protein


 4.3 g/dL


(6.4-8.2) 


 


 





 


Albumin


 1.9 g/dL


(3.4-5.0) 


 


 





 


Albumin/Globulin Ratio 0.8 (1.0-1.7)    


 


Glucose (Fingerstick)


 


 269 mg/dL


(70-99) 


 





 


O2 Saturation   96 % (92-99)  


 


Arterial Blood pH


 


 


 7.43


(7.35-7.45) 





 


Arterial Blood pCO2 at


Patient Temp 


 


 32 mmHg


(35-46) 





 


Arterial Blood pO2 at Patient


Temp 


 


 94 mmHg


() 





 


Arterial Blood HCO3


 


 


 21 mmol/L


(21-28) 





 


Arterial Blood Base Excess


 


 


 -3 mmol/L


(-3-3) 





 


FiO2   40%  








Problem List


Problems


Medical Problems:


(1) Acute pancreatitis


Status: Acute  





(2) Cholelithiasis


Status: Acute  








Assessment/Plan


severe pancreatitis 


supportive measures


will review with GAMAL Ruiz MD 3/26/20 1817:


SURGICAL PROGRESS NOTE


Assessment/Plan


Pt seen and examined.


Agree with Ms. Nash's note


Pt intubated and sedated


abd soft, distended


cont supportive care


poor surgical candidate.











SAURAV NASH 26, 2020 09:30


GAMAL ZHOU MD             Mar 26, 2020 18:17

## 2020-03-26 NOTE — PDOC
PULMONARY PROGRESS NOTES


Subjective


Patient intubated on 3/23 , sedated


she is on 40% FiO2 8 of PEEP, PIP 37


Vitals





Vital Signs








  Date Time  Temp Pulse Resp B/P (MAP) Pulse Ox O2 Delivery O2 Flow Rate FiO2


 


3/26/20 10:45  100 24 99/57    


 


3/26/20 10:31 100.6       





 100.6       


 


3/26/20 09:33     100 Ventilator  


 


3/25/20 17:59       6.0 








HEENT:  Other (nc at perrl   bipap mask on   neck no lad   no thyromegaly)


Lungs:  Crackles


Cardiovascular:  S1, S2


Abdomen:  Other (distended,  diffuse pain   no mass)


Extremities:  No Edema


Skin:  Warm


Labs





Laboratory Tests








Test


 3/24/20


12:53 3/24/20


16:30 3/25/20


00:02 3/25/20


00:55


 


Glucose (Fingerstick)


 225 mg/dL


(70-99) 


 274 mg/dL


(70-99) 





 


Sodium Level


 


 139 mmol/L


(136-145) 


 139 mmol/L


(136-145)


 


Potassium Level


 


 3.7 mmol/L


(3.5-5.1) 


 3.7 mmol/L


(3.5-5.1)


 


Chloride Level


 


 103 mmol/L


() 


 103 mmol/L


()


 


Carbon Dioxide Level


 


 29 mmol/L


(21-32) 


 23 mmol/L


(21-32)


 


Anion Gap  7 (6-14)   13 (6-14) 


 


Blood Urea Nitrogen


 


 36 mg/dL


(7-20) 


 42 mg/dL


(7-20)


 


Creatinine


 


 2.1 mg/dL


(0.6-1.0) 


 2.2 mg/dL


(0.6-1.0)


 


Estimated GFR


(Cockcroft-Gault) 


 25.0 


 


 23.7 





 


Glucose Level


 


 280 mg/dL


(70-99) 


 274 mg/dL


(70-99)


 


Calcium Level


 


 7.2 mg/dL


(8.5-10.1) 


 7.7 mg/dL


(8.5-10.1)


 


Phosphorus Level


 


 2.5 mg/dL


(2.6-4.7) 


 2.4 mg/dL


(2.6-4.7)


 


Magnesium Level


 


 2.3 mg/dL


(1.8-2.4) 


 2.3 mg/dL


(1.8-2.4)


 


Test


 3/25/20


06:00 3/25/20


08:00 3/25/20


08:55 3/25/20


15:00


 


White Blood Count


 26.3 x10^3/uL


(4.0-11.0) 


 


 





 


Red Blood Count


 2.41 x10^6/uL


(3.50-5.40) 


 


 





 


Hemoglobin


 7.8 g/dL


(12.0-15.5) 


 


 





 


Hematocrit


 23.6 %


(36.0-47.0) 


 


 





 


Mean Corpuscular Volume 98 fL ()    


 


Mean Corpuscular Hemoglobin 33 pg (25-35)    


 


Mean Corpuscular Hemoglobin


Concent 33 g/dL


(31-37) 


 


 





 


Red Cell Distribution Width


 13.4 %


(11.5-14.5) 


 


 





 


Platelet Count


 292 x10^3/uL


(140-400) 


 


 





 


Neutrophils (%) (Auto) 90 % (31-73)    


 


Lymphocytes (%) (Auto) 4 % (24-48)    


 


Monocytes (%) (Auto) 5 % (0-9)    


 


Eosinophils (%) (Auto) 1 % (0-3)    


 


Basophils (%) (Auto) 0 % (0-3)    


 


Neutrophils # (Auto)


 23.8 x10^3/uL


(1.8-7.7) 


 


 





 


Lymphocytes # (Auto)


 1.0 x10^3/uL


(1.0-4.8) 


 


 





 


Monocytes # (Auto)


 1.2 x10^3/uL


(0.0-1.1) 


 


 





 


Eosinophils # (Auto)


 0.3 x10^3/uL


(0.0-0.7) 


 


 





 


Basophils # (Auto)


 0.0 x10^3/uL


(0.0-0.2) 


 


 





 


Segmented Neutrophils % 73 % (35-66)    


 


Band Neutrophils % 19 % (0-9)    


 


Lymphocytes % 3 % (24-48)    


 


Monocytes % 1 % (0-10)    


 


Metamyelocytes % 3 % (0-0)    


 


Myelocytes % 1 % (0-0)    


 


Toxic Granulation Slight    


 


Dohle Bodies Few    


 


Platelet Estimate


 Adequate


(ADEQUATE) 


 


 





 


Sodium Level


 137 mmol/L


(136-145) 


 


 141 mmol/L


(136-145)


 


Potassium Level


 3.6 mmol/L


(3.5-5.1) 


 


 3.8 mmol/L


(3.5-5.1)


 


Chloride Level


 103 mmol/L


() 


 


 104 mmol/L


()


 


Carbon Dioxide Level


 26 mmol/L


(21-32) 


 


 25 mmol/L


(21-32)


 


Anion Gap 8 (6-14)    12 (6-14) 


 


Blood Urea Nitrogen


 50 mg/dL


(7-20) 


 


 49 mg/dL


(7-20)


 


Creatinine


 2.7 mg/dL


(0.6-1.0) 


 


 2.5 mg/dL


(0.6-1.0)


 


Estimated GFR


(Cockcroft-Gault) 18.7 


 


 


 20.5 





 


BUN/Creatinine Ratio 19 (6-20)    


 


Glucose Level


 270 mg/dL


(70-99) 


 


 290 mg/dL


(70-99)


 


Calcium Level


 7.5 mg/dL


(8.5-10.1) 


 


 7.4 mg/dL


(8.5-10.1)


 


Phosphorus Level


 2.3 mg/dL


(2.6-4.7) 


 


 3.9 mg/dL


(2.6-4.7)


 


Magnesium Level


 2.3 mg/dL


(1.8-2.4) 


 


 2.2 mg/dL


(1.8-2.4)


 


Total Bilirubin


 0.4 mg/dL


(0.2-1.0) 


 


 





 


Aspartate Amino Transf


(AST/SGOT) 27 U/L (15-37) 


 


 


 





 


Alanine Aminotransferase


(ALT/SGPT) 12 U/L (14-59) 


 


 


 





 


Alkaline Phosphatase


 82 U/L


() 


 


 





 


Total Protein


 4.3 g/dL


(6.4-8.2) 


 


 





 


Albumin


 1.4 g/dL


(3.4-5.0) 


 


 





 


Albumin/Globulin Ratio 0.5 (1.0-1.7)    


 


Lactic Acid Level


 


 1.4 mmol/L


(0.4-2.0) 


 





 


O2 Saturation   97 % (92-99)  


 


Arterial Blood pH


 


 


 7.45


(7.35-7.45) 





 


Arterial Blood pCO2 at


Patient Temp 


 


 32 mmHg


(35-46) 





 


Arterial Blood pO2 at Patient


Temp 


 


 99 mmHg


() 





 


Arterial Blood HCO3


 


 


 22 mmol/L


(21-28) 





 


Arterial Blood Base Excess


 


 


 -2 mmol/L


(-3-3) 





 


FiO2   40  


 


Test


 3/25/20


17:38 3/25/20


21:00 3/26/20


00:20 3/26/20


05:20


 


Glucose (Fingerstick)


 253 mg/dL


(70-99) 


 210 mg/dL


(70-99) 





 


Sodium Level


 


 138 mmol/L


(136-145) 


 137 mmol/L


(136-145)


 


Potassium Level


 


 3.7 mmol/L


(3.5-5.1) 


 3.8 mmol/L


(3.5-5.1)


 


Chloride Level


 


 104 mmol/L


() 


 103 mmol/L


()


 


Carbon Dioxide Level


 


 27 mmol/L


(21-32) 


 22 mmol/L


(21-32)


 


Anion Gap  7 (6-14)   12 (6-14) 


 


Blood Urea Nitrogen


 


 44 mg/dL


(7-20) 


 59 mg/dL


(7-20)


 


Creatinine


 


 2.0 mg/dL


(0.6-1.0) 


 2.5 mg/dL


(0.6-1.0)


 


Estimated GFR


(Cockcroft-Gault) 


 26.5 


 


 20.5 





 


Glucose Level


 


 195 mg/dL


(70-99) 


 279 mg/dL


(70-99)


 


Calcium Level


 


 7.5 mg/dL


(8.5-10.1) 


 7.7 mg/dL


(8.5-10.1)


 


Phosphorus Level


 


 3.6 mg/dL


(2.6-4.7) 


 





 


Magnesium Level


 


 2.3 mg/dL


(1.8-2.4) 


 





 


White Blood Count


 


 


 


 23.8 x10^3/uL


(4.0-11.0)


 


Red Blood Count


 


 


 


 1.97 x10^6/uL


(3.50-5.40)


 


Hemoglobin


 


 


 


 6.5 g/dL


(12.0-15.5)


 


Hematocrit


 


 


 


 19.3 %


(36.0-47.0)


 


Mean Corpuscular Volume    98 fL () 


 


Mean Corpuscular Hemoglobin    33 pg (25-35) 


 


Mean Corpuscular Hemoglobin


Concent 


 


 


 34 g/dL


(31-37)


 


Red Cell Distribution Width


 


 


 


 13.8 %


(11.5-14.5)


 


Platelet Count


 


 


 


 274 x10^3/uL


(140-400)


 


Neutrophils (%) (Auto)    89 % (31-73) 


 


Lymphocytes (%) (Auto)    5 % (24-48) 


 


Monocytes (%) (Auto)    4 % (0-9) 


 


Eosinophils (%) (Auto)    1 % (0-3) 


 


Basophils (%) (Auto)    0 % (0-3) 


 


Neutrophils # (Auto)


 


 


 


 21.2 x10^3/uL


(1.8-7.7)


 


Lymphocytes # (Auto)


 


 


 


 1.2 x10^3/uL


(1.0-4.8)


 


Monocytes # (Auto)


 


 


 


 1.0 x10^3/uL


(0.0-1.1)


 


Eosinophils # (Auto)


 


 


 


 0.3 x10^3/uL


(0.0-0.7)


 


Basophils # (Auto)


 


 


 


 0.1 x10^3/uL


(0.0-0.2)


 


BUN/Creatinine Ratio    24 (6-20) 


 


Total Bilirubin


 


 


 


 0.6 mg/dL


(0.2-1.0)


 


Aspartate Amino Transf


(AST/SGOT) 


 


 


 37 U/L (15-37) 





 


Alanine Aminotransferase


(ALT/SGPT) 


 


 


 16 U/L (14-59) 





 


Alkaline Phosphatase


 


 


 


 83 U/L


()


 


Total Protein


 


 


 


 4.3 g/dL


(6.4-8.2)


 


Albumin


 


 


 


 1.9 g/dL


(3.4-5.0)


 


Albumin/Globulin Ratio    0.8 (1.0-1.7) 


 


Test


 3/26/20


05:32 3/26/20


07:45 


 





 


Glucose (Fingerstick)


 269 mg/dL


(70-99) 


 


 





 


O2 Saturation  96 % (92-99)   


 


Arterial Blood pH


 


 7.43


(7.35-7.45) 


 





 


Arterial Blood pCO2 at


Patient Temp 


 32 mmHg


(35-46) 


 





 


Arterial Blood pO2 at Patient


Temp 


 94 mmHg


() 


 





 


Arterial Blood HCO3


 


 21 mmol/L


(21-28) 


 





 


Arterial Blood Base Excess


 


 -3 mmol/L


(-3-3) 


 





 


FiO2  40%   








Laboratory Tests








Test


 3/25/20


15:00 3/25/20


17:38 3/25/20


21:00 3/26/20


00:20


 


Sodium Level


 141 mmol/L


(136-145) 


 138 mmol/L


(136-145) 





 


Potassium Level


 3.8 mmol/L


(3.5-5.1) 


 3.7 mmol/L


(3.5-5.1) 





 


Chloride Level


 104 mmol/L


() 


 104 mmol/L


() 





 


Carbon Dioxide Level


 25 mmol/L


(21-32) 


 27 mmol/L


(21-32) 





 


Anion Gap 12 (6-14)   7 (6-14)  


 


Blood Urea Nitrogen


 49 mg/dL


(7-20) 


 44 mg/dL


(7-20) 





 


Creatinine


 2.5 mg/dL


(0.6-1.0) 


 2.0 mg/dL


(0.6-1.0) 





 


Estimated GFR


(Cockcroft-Gault) 20.5 


 


 26.5 


 





 


Glucose Level


 290 mg/dL


(70-99) 


 195 mg/dL


(70-99) 





 


Calcium Level


 7.4 mg/dL


(8.5-10.1) 


 7.5 mg/dL


(8.5-10.1) 





 


Phosphorus Level


 3.9 mg/dL


(2.6-4.7) 


 3.6 mg/dL


(2.6-4.7) 





 


Magnesium Level


 2.2 mg/dL


(1.8-2.4) 


 2.3 mg/dL


(1.8-2.4) 





 


Glucose (Fingerstick)


 


 253 mg/dL


(70-99) 


 210 mg/dL


(70-99)


 


Test


 3/26/20


05:20 3/26/20


05:32 3/26/20


07:45 





 


White Blood Count


 23.8 x10^3/uL


(4.0-11.0) 


 


 





 


Red Blood Count


 1.97 x10^6/uL


(3.50-5.40) 


 


 





 


Hemoglobin


 6.5 g/dL


(12.0-15.5) 


 


 





 


Hematocrit


 19.3 %


(36.0-47.0) 


 


 





 


Mean Corpuscular Volume 98 fL ()    


 


Mean Corpuscular Hemoglobin 33 pg (25-35)    


 


Mean Corpuscular Hemoglobin


Concent 34 g/dL


(31-37) 


 


 





 


Red Cell Distribution Width


 13.8 %


(11.5-14.5) 


 


 





 


Platelet Count


 274 x10^3/uL


(140-400) 


 


 





 


Neutrophils (%) (Auto) 89 % (31-73)    


 


Lymphocytes (%) (Auto) 5 % (24-48)    


 


Monocytes (%) (Auto) 4 % (0-9)    


 


Eosinophils (%) (Auto) 1 % (0-3)    


 


Basophils (%) (Auto) 0 % (0-3)    


 


Neutrophils # (Auto)


 21.2 x10^3/uL


(1.8-7.7) 


 


 





 


Lymphocytes # (Auto)


 1.2 x10^3/uL


(1.0-4.8) 


 


 





 


Monocytes # (Auto)


 1.0 x10^3/uL


(0.0-1.1) 


 


 





 


Eosinophils # (Auto)


 0.3 x10^3/uL


(0.0-0.7) 


 


 





 


Basophils # (Auto)


 0.1 x10^3/uL


(0.0-0.2) 


 


 





 


Sodium Level


 137 mmol/L


(136-145) 


 


 





 


Potassium Level


 3.8 mmol/L


(3.5-5.1) 


 


 





 


Chloride Level


 103 mmol/L


() 


 


 





 


Carbon Dioxide Level


 22 mmol/L


(21-32) 


 


 





 


Anion Gap 12 (6-14)    


 


Blood Urea Nitrogen


 59 mg/dL


(7-20) 


 


 





 


Creatinine


 2.5 mg/dL


(0.6-1.0) 


 


 





 


Estimated GFR


(Cockcroft-Gault) 20.5 


 


 


 





 


BUN/Creatinine Ratio 24 (6-20)    


 


Glucose Level


 279 mg/dL


(70-99) 


 


 





 


Calcium Level


 7.7 mg/dL


(8.5-10.1) 


 


 





 


Total Bilirubin


 0.6 mg/dL


(0.2-1.0) 


 


 





 


Aspartate Amino Transf


(AST/SGOT) 37 U/L (15-37) 


 


 


 





 


Alanine Aminotransferase


(ALT/SGPT) 16 U/L (14-59) 


 


 


 





 


Alkaline Phosphatase


 83 U/L


() 


 


 





 


Total Protein


 4.3 g/dL


(6.4-8.2) 


 


 





 


Albumin


 1.9 g/dL


(3.4-5.0) 


 


 





 


Albumin/Globulin Ratio 0.8 (1.0-1.7)    


 


Glucose (Fingerstick)


 


 269 mg/dL


(70-99) 


 





 


O2 Saturation   96 % (92-99)  


 


Arterial Blood pH


 


 


 7.43


(7.35-7.45) 





 


Arterial Blood pCO2 at


Patient Temp 


 


 32 mmHg


(35-46) 





 


Arterial Blood pO2 at Patient


Temp 


 


 94 mmHg


() 





 


Arterial Blood HCO3


 


 


 21 mmol/L


(21-28) 





 


Arterial Blood Base Excess


 


 


 -3 mmol/L


(-3-3) 





 


FiO2   40%  








Medications





Active Scripts








 Medications  Dose


 Route/Sig


 Max Daily Dose Days Date Category


 


 Bisoprolol


 Fumarate 5 Mg


 Tablet  10 Mg


 PO DAILY


   3/16/20 Reported








Comments


3/26


cxr poor insp film


interstitial prominence/bilateral effusions worsening





Impression


.


IMPRESSION:


1.  Acute hypoxemic respiratory failure secondary to ARDS due toacute 

pancreatitis, sepsis, abdominal distention, and pneumonia.and pleural effusions.

 Pleural effusions secondary to abdominal process and/or general volume overload

from renal failure.  Gas exchane better compared to earlier, but more effusions.


2.  Gallstone pancreatitis. WITH NECROSIS


3.  Severe metabolic acidosis.


4.  Acute kidney injury. ON CRRT


5.  Acute gallstone pancreatitis.


6.  Hypoalbuminemia.


7.  Hypocalcemia.


8.  Leukocytosis


9.  Chronic anemia





Plan


.


Cont AC mode , 40 FIO2/ 8 PEEP


Poor prognosis/ ARDS


supportive care


CRRT


Repeat CT once stable


Antibiotics per ID


Follow surgery input


Follow nephrology input


Nutritional support with TPN


Prognosis is extremely poor


d/w RN/RT





critical care time spent reviewing chart and xrays and examining and managing 

patient 40 min











ISATU DAVENPORT MD              Mar 26, 2020 11:18

## 2020-03-26 NOTE — RAD
Abdominal ultrasound right upper quadrant:

 

Reason for examination: Fever and gallstones. Possible pancreatitis. 

Ascites.

 

Comparison is made to previous study dated 3/16/2020.

 

Patient was limited due to patient body habitus and condition and patient 

couldn't be moved or hold breath.

 

The pancreas is poorly visualized due to bowel gas but the portion of the 

pancreas visualized appears to be enlarged and hypoechoic which would be 

consistent with history of pancreatitis. The inferior vena cava is poorly 

visualized due to bowel gas. The liver appears to be mildly enlarged at 

18.4 cm and shows diffuse fatty infiltration. No focal hepatic lesions are

seen. Portal vein is patent with hepatopedal flow. Gallbladder shows 

cholelithiasis but shows no wall thickening or pericholecystic fluid. 

Common bile duct is normal in caliber at 1.5 mm. Right kidney is poorly 

visualized but appears to measure approximately 14.1 x 7.1 x 6.6 cm in 

greatest dimension with no mass or hydronephrosis evident. There is 

ascites present.

 

IMPRESSION:

 

Poorly visualized pancreas but the portion visualized appears enlarged and

hypoechoic consistent with history of pancreatitis. Cholelithiasis. 

Ascites. Mild hepatomegaly with diffuse fatty infiltration.

 

 

 

Electronically signed by: Ladan Mckenna MD (3/26/2020 10:31 AM) [id number]

## 2020-03-26 NOTE — NUR
Pharmacy TPN Dosing Note



S: JESENIA RICH is a 49 year old F Currently receiving Central Continuous TPN started 
03/18/20



B:Pertinent PMH: Necrotizing pancreatitis

Height: 5 feet, 8 inches

Weight: 110.6 kg



Current diet: NPO 



LABS:

Sodium:    137 

Potassium: 3.8 

Chloride:  103 

Calcium:   7.7 

Corrected Calcium: 9.38 

Magnesium: 2.3 

CO2:       22 

SCr:       2.5 

Glucose:   269 

Albumin:   1.9 

AST:       37 

ALT:       16 



TPN FORMULA:

TPN TYPE:  Central Continuous

AMINO ACIDS:         125 gm

DEXTROSE:            195 gm

LIPIDS:              40 gm



SODIUM CHLORIDE:     90 mEq

POTASSIUM CHLORIDE:  15 mEq

POTASSIUM PHOSPHATE: 18 mmol

MAGNESIUM:           8 mEq

CALCIUM:             15 mEq

MULTIPLE VITAMIN:    10 ml

TRACE ELEMENTS:      0.5 ml(s)



TPN PLAN:  Continue same.





R: Continue TPN 

Will monitor electrolytes, glucose, and tolerance to TPN.



 Maribell Vazquez RUTHANN, 03/26/20 [id number]

## 2020-03-26 NOTE — PDOC
Objective:


Objective:


D/w nurse - concern w/ fever, Tylenol not helping.


Note orders for blood transfusion.


Vital Signs:





                                   Vital Signs








  Date Time  Temp Pulse Resp B/P (MAP) Pulse Ox O2 Delivery O2 Flow Rate FiO2


 


3/26/20 09:33     100 Ventilator  


 


3/26/20 09:00  109 26 109/47 (67)    


 


3/26/20 08:00 102.7       





 102.7       


 


3/25/20 17:59       6.0 








Labs:





Laboratory Tests








Test


 3/25/20


15:00 3/25/20


17:38 3/25/20


21:00 3/26/20


00:20


 


Sodium Level 141 mmol/L   138 mmol/L  


 


Potassium Level 3.8 mmol/L   3.7 mmol/L  


 


Chloride Level 104 mmol/L   104 mmol/L  


 


Carbon Dioxide Level 25 mmol/L   27 mmol/L  


 


Anion Gap 12   7  


 


Blood Urea Nitrogen 49 mg/dL   44 mg/dL  


 


Creatinine 2.5 mg/dL   2.0 mg/dL  


 


Estimated GFR


(Cockcroft-Gault) 20.5 


 


 26.5 


 





 


Glucose Level 290 mg/dL   195 mg/dL  


 


Calcium Level 7.4 mg/dL   7.5 mg/dL  


 


Phosphorus Level 3.9 mg/dL   3.6 mg/dL  


 


Magnesium Level 2.2 mg/dL   2.3 mg/dL  


 


Glucose (Fingerstick)  253 mg/dL   210 mg/dL 


 


Test


 3/26/20


05:20 3/26/20


05:32 3/26/20


07:45 





 


White Blood Count 23.8 x10^3/uL    


 


Red Blood Count 1.97 x10^6/uL    


 


Hemoglobin 6.5 g/dL    


 


Hematocrit 19.3 %    


 


Mean Corpuscular Volume 98 fL    


 


Mean Corpuscular Hemoglobin 33 pg    


 


Mean Corpuscular Hemoglobin


Concent 34 g/dL 


 


 


 





 


Red Cell Distribution Width 13.8 %    


 


Platelet Count 274 x10^3/uL    


 


Neutrophils (%) (Auto) 89 %    


 


Lymphocytes (%) (Auto) 5 %    


 


Monocytes (%) (Auto) 4 %    


 


Eosinophils (%) (Auto) 1 %    


 


Basophils (%) (Auto) 0 %    


 


Neutrophils # (Auto) 21.2 x10^3/uL    


 


Lymphocytes # (Auto) 1.2 x10^3/uL    


 


Monocytes # (Auto) 1.0 x10^3/uL    


 


Eosinophils # (Auto) 0.3 x10^3/uL    


 


Basophils # (Auto) 0.1 x10^3/uL    


 


Sodium Level 137 mmol/L    


 


Potassium Level 3.8 mmol/L    


 


Chloride Level 103 mmol/L    


 


Carbon Dioxide Level 22 mmol/L    


 


Anion Gap 12    


 


Blood Urea Nitrogen 59 mg/dL    


 


Creatinine 2.5 mg/dL    


 


Estimated GFR


(Cockcroft-Gault) 20.5 


 


 


 





 


BUN/Creatinine Ratio 24    


 


Glucose Level 279 mg/dL    


 


Calcium Level 7.7 mg/dL    


 


Total Bilirubin 0.6 mg/dL    


 


Aspartate Amino Transf


(AST/SGOT) 37 U/L 


 


 


 





 


Alanine Aminotransferase


(ALT/SGPT) 16 U/L 


 


 


 





 


Alkaline Phosphatase 83 U/L    


 


Total Protein 4.3 g/dL    


 


Albumin 1.9 g/dL    


 


Albumin/Globulin Ratio 0.8    


 


Glucose (Fingerstick)  269 mg/dL   


 


O2 Saturation   96 %  


 


Arterial Blood pH   7.43  


 


Arterial Blood pCO2 at


Patient Temp 


 


 32 mmHg 


 





 


Arterial Blood pO2 at Patient


Temp 


 


 94 mmHg 


 





 


Arterial Blood HCO3   21 mmol/L  


 


Arterial Blood Base Excess   -3 mmol/L  


 


FiO2   40%  














  BLOOD CULTURE  Preliminary  


        NO GROWTH AFTER 2 DAYS


Imaging:


CXR 3/26


Impression:


The ET tube, feeding tube, right-sided PICC line, left-sided internal jugular li

ne, dialysis catheter in place. Moderate prominent appearing bilateral 

interstitial lung markings likely congestive changes with small 


bilateral pleural effusions.





PE:





GEN: intubated, CRRT, febrile


HEENT: OG bilious - less dark


LUNGS: diminished


HEART: tachycardic


ABD: ?softer, rectal tube dark liquid


EXTREMITY: some edema, boots


NEURO/PSYCH: sedated





A/P:


Gallstone pancreatitis, MOSF, fever





--


Will review w/ Dr. Bhatia.





Hemodynamically unstable?:  Yes


Is patient in severe pain?:  Yes


Is NPO status required?:  Yes











RAGHAVENDRA MURCIA         Mar 26, 2020 10:12

## 2020-03-27 VITALS — SYSTOLIC BLOOD PRESSURE: 102 MMHG | DIASTOLIC BLOOD PRESSURE: 66 MMHG

## 2020-03-27 VITALS — DIASTOLIC BLOOD PRESSURE: 74 MMHG | SYSTOLIC BLOOD PRESSURE: 97 MMHG

## 2020-03-27 VITALS — SYSTOLIC BLOOD PRESSURE: 109 MMHG | DIASTOLIC BLOOD PRESSURE: 62 MMHG

## 2020-03-27 VITALS — SYSTOLIC BLOOD PRESSURE: 133 MMHG | DIASTOLIC BLOOD PRESSURE: 52 MMHG

## 2020-03-27 VITALS — SYSTOLIC BLOOD PRESSURE: 96 MMHG | DIASTOLIC BLOOD PRESSURE: 65 MMHG

## 2020-03-27 VITALS — SYSTOLIC BLOOD PRESSURE: 121 MMHG | DIASTOLIC BLOOD PRESSURE: 92 MMHG

## 2020-03-27 VITALS — DIASTOLIC BLOOD PRESSURE: 65 MMHG | SYSTOLIC BLOOD PRESSURE: 100 MMHG

## 2020-03-27 VITALS — DIASTOLIC BLOOD PRESSURE: 68 MMHG | SYSTOLIC BLOOD PRESSURE: 85 MMHG

## 2020-03-27 VITALS — SYSTOLIC BLOOD PRESSURE: 86 MMHG | DIASTOLIC BLOOD PRESSURE: 60 MMHG

## 2020-03-27 VITALS — SYSTOLIC BLOOD PRESSURE: 104 MMHG | DIASTOLIC BLOOD PRESSURE: 57 MMHG

## 2020-03-27 VITALS — SYSTOLIC BLOOD PRESSURE: 122 MMHG | DIASTOLIC BLOOD PRESSURE: 83 MMHG

## 2020-03-27 VITALS — DIASTOLIC BLOOD PRESSURE: 70 MMHG | SYSTOLIC BLOOD PRESSURE: 94 MMHG

## 2020-03-27 VITALS — SYSTOLIC BLOOD PRESSURE: 109 MMHG | DIASTOLIC BLOOD PRESSURE: 77 MMHG

## 2020-03-27 VITALS — DIASTOLIC BLOOD PRESSURE: 57 MMHG | SYSTOLIC BLOOD PRESSURE: 108 MMHG

## 2020-03-27 VITALS — SYSTOLIC BLOOD PRESSURE: 106 MMHG | DIASTOLIC BLOOD PRESSURE: 75 MMHG

## 2020-03-27 VITALS — DIASTOLIC BLOOD PRESSURE: 69 MMHG | SYSTOLIC BLOOD PRESSURE: 82 MMHG

## 2020-03-27 VITALS — DIASTOLIC BLOOD PRESSURE: 57 MMHG | SYSTOLIC BLOOD PRESSURE: 92 MMHG

## 2020-03-27 VITALS — SYSTOLIC BLOOD PRESSURE: 83 MMHG | DIASTOLIC BLOOD PRESSURE: 47 MMHG

## 2020-03-27 VITALS — SYSTOLIC BLOOD PRESSURE: 86 MMHG | DIASTOLIC BLOOD PRESSURE: 55 MMHG

## 2020-03-27 VITALS — DIASTOLIC BLOOD PRESSURE: 66 MMHG | SYSTOLIC BLOOD PRESSURE: 106 MMHG

## 2020-03-27 VITALS — DIASTOLIC BLOOD PRESSURE: 78 MMHG | SYSTOLIC BLOOD PRESSURE: 130 MMHG

## 2020-03-27 VITALS — SYSTOLIC BLOOD PRESSURE: 93 MMHG | DIASTOLIC BLOOD PRESSURE: 65 MMHG

## 2020-03-27 VITALS — DIASTOLIC BLOOD PRESSURE: 62 MMHG | SYSTOLIC BLOOD PRESSURE: 105 MMHG

## 2020-03-27 VITALS — DIASTOLIC BLOOD PRESSURE: 73 MMHG | SYSTOLIC BLOOD PRESSURE: 93 MMHG

## 2020-03-27 LAB
ALBUMIN SERPL-MCNC: 1.7 G/DL (ref 3.4–5)
ALBUMIN/GLOB SERPL: 0.6 {RATIO} (ref 1–1.7)
ALP SERPL-CCNC: 108 U/L (ref 46–116)
ALT SERPL-CCNC: 18 U/L (ref 14–59)
ANION GAP SERPL CALC-SCNC: 10 MMOL/L (ref 6–14)
ANION GAP SERPL CALC-SCNC: 7 MMOL/L (ref 6–14)
ANION GAP SERPL CALC-SCNC: 8 MMOL/L (ref 6–14)
ANION GAP SERPL CALC-SCNC: 9 MMOL/L (ref 6–14)
AST SERPL-CCNC: 34 U/L (ref 15–37)
BASE EXCESS ABG: -5 MMOL/L (ref -3–3)
BASOPHILS # BLD AUTO: 0.1 X10^3/UL (ref 0–0.2)
BASOPHILS NFR BLD: 0 % (ref 0–3)
BILIRUB SERPL-MCNC: 0.5 MG/DL (ref 0.2–1)
BUN SERPL-MCNC: 41 MG/DL (ref 7–20)
BUN SERPL-MCNC: 44 MG/DL (ref 7–20)
BUN SERPL-MCNC: 44 MG/DL (ref 7–20)
BUN SERPL-MCNC: 45 MG/DL (ref 7–20)
BUN/CREAT SERPL: 28 (ref 6–20)
CALCIUM SERPL-MCNC: 7.7 MG/DL (ref 8.5–10.1)
CALCIUM SERPL-MCNC: 7.8 MG/DL (ref 8.5–10.1)
CALCIUM SERPL-MCNC: 7.8 MG/DL (ref 8.5–10.1)
CALCIUM SERPL-MCNC: 7.9 MG/DL (ref 8.5–10.1)
CHLORIDE SERPL-SCNC: 104 MMOL/L (ref 98–107)
CHLORIDE SERPL-SCNC: 105 MMOL/L (ref 98–107)
CHLORIDE SERPL-SCNC: 105 MMOL/L (ref 98–107)
CHLORIDE SERPL-SCNC: 106 MMOL/L (ref 98–107)
CO2 SERPL-SCNC: 24 MMOL/L (ref 21–32)
CO2 SERPL-SCNC: 24 MMOL/L (ref 21–32)
CO2 SERPL-SCNC: 25 MMOL/L (ref 21–32)
CO2 SERPL-SCNC: 27 MMOL/L (ref 21–32)
CREAT SERPL-MCNC: 1.5 MG/DL (ref 0.6–1)
CREAT SERPL-MCNC: 1.6 MG/DL (ref 0.6–1)
CREAT SERPL-MCNC: 1.7 MG/DL (ref 0.6–1)
CREAT SERPL-MCNC: 1.8 MG/DL (ref 0.6–1)
EOSINOPHIL NFR BLD: 0.6 X10^3/UL (ref 0–0.7)
EOSINOPHIL NFR BLD: 2 % (ref 0–3)
ERYTHROCYTE [DISTWIDTH] IN BLOOD BY AUTOMATED COUNT: 15.9 % (ref 11.5–14.5)
GFR SERPLBLD BASED ON 1.73 SQ M-ARVRAT: 29.9 ML/MIN
GFR SERPLBLD BASED ON 1.73 SQ M-ARVRAT: 31.9 ML/MIN
GFR SERPLBLD BASED ON 1.73 SQ M-ARVRAT: 34.3 ML/MIN
GFR SERPLBLD BASED ON 1.73 SQ M-ARVRAT: 36.9 ML/MIN
GLOBULIN SER-MCNC: 2.8 G/DL (ref 2.2–3.8)
GLUCOSE SERPL-MCNC: 216 MG/DL (ref 70–99)
GLUCOSE SERPL-MCNC: 250 MG/DL (ref 70–99)
GLUCOSE SERPL-MCNC: 259 MG/DL (ref 70–99)
GLUCOSE SERPL-MCNC: 261 MG/DL (ref 70–99)
HCO3 BLDA-SCNC: 21 MMOL/L (ref 21–28)
HCT VFR BLD CALC: 24.4 % (ref 36–47)
HGB BLD-MCNC: 7.8 G/DL (ref 12–15.5)
INSPIRATION SETTING TIME VENT: (no result)
LYMPHOCYTES # BLD: 1.3 X10^3/UL (ref 1–4.8)
LYMPHOCYTES NFR BLD AUTO: 4 % (ref 24–48)
MAGNESIUM SERPL-MCNC: 2.1 MG/DL (ref 1.8–2.4)
MAGNESIUM SERPL-MCNC: 2.1 MG/DL (ref 1.8–2.4)
MAGNESIUM SERPL-MCNC: 2.2 MG/DL (ref 1.8–2.4)
MCH RBC QN AUTO: 31 PG (ref 25–35)
MCHC RBC AUTO-ENTMCNC: 32 G/DL (ref 31–37)
MCV RBC AUTO: 97 FL (ref 79–100)
MONO #: 1.1 X10^3/UL (ref 0–1.1)
MONOCYTES NFR BLD: 3 % (ref 0–9)
NEUT #: 31.3 X10^3/UL (ref 1.8–7.7)
NEUTROPHILS NFR BLD AUTO: 91 % (ref 31–73)
PCO2 BLDA: 40 MMHG (ref 35–46)
PHOSPHATE SERPL-MCNC: 4.3 MG/DL (ref 2.6–4.7)
PLATELET # BLD AUTO: 281 X10^3/UL (ref 140–400)
PO2 BLDA: 110 MMHG (ref 75–108)
POTASSIUM SERPL-SCNC: 4.3 MMOL/L (ref 3.5–5.1)
POTASSIUM SERPL-SCNC: 4.4 MMOL/L (ref 3.5–5.1)
PROT SERPL-MCNC: 4.5 G/DL (ref 6.4–8.2)
RBC # BLD AUTO: 2.51 X10^6/UL (ref 3.5–5.4)
SAO2 % BLDA: 97 % (ref 92–99)
SODIUM SERPL-SCNC: 138 MMOL/L (ref 136–145)
SODIUM SERPL-SCNC: 138 MMOL/L (ref 136–145)
SODIUM SERPL-SCNC: 139 MMOL/L (ref 136–145)
SODIUM SERPL-SCNC: 139 MMOL/L (ref 136–145)
WBC # BLD AUTO: 34.3 X10^3/UL (ref 4–11)

## 2020-03-27 RX ADMIN — PANTOPRAZOLE SODIUM SCH MG: 40 INJECTION, POWDER, FOR SOLUTION INTRAVENOUS at 09:02

## 2020-03-27 RX ADMIN — POTASSIUM CHLORIDE SCH MLS/HR: 2 INJECTION, SOLUTION, CONCENTRATE INTRAVENOUS at 09:44

## 2020-03-27 RX ADMIN — CEFEPIME SCH GM: 2 INJECTION, POWDER, FOR SOLUTION INTRAVENOUS at 21:23

## 2020-03-27 RX ADMIN — HEPARIN SODIUM SCH UNIT: 5000 INJECTION, SOLUTION INTRAVENOUS; SUBCUTANEOUS at 06:00

## 2020-03-27 RX ADMIN — CEFEPIME SCH GM: 2 INJECTION, POWDER, FOR SOLUTION INTRAVENOUS at 09:00

## 2020-03-27 RX ADMIN — POTASSIUM CHLORIDE SCH MLS/HR: 2 INJECTION, SOLUTION, CONCENTRATE INTRAVENOUS at 21:41

## 2020-03-27 RX ADMIN — DEXTROSE SCH MLS/HR: 50 INJECTION, SOLUTION INTRAVENOUS at 09:01

## 2020-03-27 RX ADMIN — METOPROLOL TARTRATE SCH MG: 5 INJECTION INTRAVENOUS at 11:34

## 2020-03-27 RX ADMIN — INSULIN LISPRO SCH UNITS: 100 INJECTION, SOLUTION INTRAVENOUS; SUBCUTANEOUS at 06:01

## 2020-03-27 RX ADMIN — POTASSIUM CHLORIDE SCH MLS/HR: 2 INJECTION, SOLUTION, CONCENTRATE INTRAVENOUS at 21:42

## 2020-03-27 RX ADMIN — HEPARIN SODIUM SCH UNIT: 5000 INJECTION, SOLUTION INTRAVENOUS; SUBCUTANEOUS at 10:29

## 2020-03-27 RX ADMIN — POTASSIUM CHLORIDE SCH MLS/HR: 2 INJECTION, SOLUTION, CONCENTRATE INTRAVENOUS at 10:59

## 2020-03-27 RX ADMIN — AMIODARONE HYDROCHLORIDE PRN MLS/HR: 50 INJECTION, SOLUTION INTRAVENOUS at 16:07

## 2020-03-27 RX ADMIN — ALBUMIN (HUMAN) PRN MLS/HR: 12.5 INJECTION, SOLUTION INTRAVENOUS at 14:11

## 2020-03-27 RX ADMIN — POTASSIUM CHLORIDE SCH MLS/HR: 2 INJECTION, SOLUTION, CONCENTRATE INTRAVENOUS at 11:00

## 2020-03-27 RX ADMIN — HEPARIN SODIUM SCH UNIT: 5000 INJECTION, SOLUTION INTRAVENOUS; SUBCUTANEOUS at 21:34

## 2020-03-27 RX ADMIN — POTASSIUM CHLORIDE SCH MLS/HR: 2 INJECTION, SOLUTION, CONCENTRATE INTRAVENOUS at 16:20

## 2020-03-27 RX ADMIN — INSULIN LISPRO SCH UNITS: 100 INJECTION, SOLUTION INTRAVENOUS; SUBCUTANEOUS at 13:53

## 2020-03-27 RX ADMIN — POTASSIUM CHLORIDE SCH MLS/HR: 2 INJECTION, SOLUTION, CONCENTRATE INTRAVENOUS at 09:04

## 2020-03-27 RX ADMIN — INSULIN LISPRO SCH UNITS: 100 INJECTION, SOLUTION INTRAVENOUS; SUBCUTANEOUS at 18:12

## 2020-03-27 RX ADMIN — METOPROLOL TARTRATE SCH MG: 5 INJECTION INTRAVENOUS at 18:00

## 2020-03-27 RX ADMIN — POTASSIUM CHLORIDE SCH MLS/HR: 2 INJECTION, SOLUTION, CONCENTRATE INTRAVENOUS at 09:05

## 2020-03-27 RX ADMIN — METOPROLOL TARTRATE SCH MG: 5 INJECTION INTRAVENOUS at 06:00

## 2020-03-27 RX ADMIN — Medication PRN EACH: at 13:28

## 2020-03-27 RX ADMIN — DAPTOMYCIN SCH MLS/HR: 500 INJECTION, POWDER, LYOPHILIZED, FOR SOLUTION INTRAVENOUS at 09:45

## 2020-03-27 RX ADMIN — POTASSIUM CHLORIDE SCH MLS/HR: 2 INJECTION, SOLUTION, CONCENTRATE INTRAVENOUS at 03:02

## 2020-03-27 RX ADMIN — POTASSIUM CHLORIDE SCH MLS/HR: 2 INJECTION, SOLUTION, CONCENTRATE INTRAVENOUS at 16:21

## 2020-03-27 NOTE — PDOC
Objective:


Objective:


Abd seems firmer per nurse.


Vital Signs:





                                   Vital Signs








  Date Time  Temp Pulse Resp B/P (MAP) Pulse Ox O2 Delivery O2 Flow Rate FiO2


 


3/27/20 10:08     100 Ventilator  


 


3/27/20 09:00  124 26 121/92 (102)    


 


3/27/20 07:00 98.4       





 98.4       


 


3/27/20 02:06       6.0 








Labs:





Laboratory Tests








Test


 3/26/20


12:01 3/26/20


23:20 3/27/20


05:57


 


Glucose (Fingerstick)


 264 mg/dL


(70-99) 255 mg/dL


(70-99) 225 mg/dL


(70-99)











PE:





GEN: intubated, CRRT


LUNGS: clear


HEART: tachycardic


ABD: distended, more firm


NEURO/PSYCH: sedated





A/P:


Gallstone pancreatitis, MOSF, r/o COVID-19





--


Continue support.





Hemodynamically unstable?:  Yes


Is patient in severe pain?:  Yes


Is NPO status required?:  Yes











RAGHAVENDRA MURCIA         Mar 27, 2020 10:51

## 2020-03-27 NOTE — PDOC
PULMONARY PROGRESS NOTES


Subjective


Patient intubated on 3/23 , sedated


she is on 40% FiO2 8 of PEEP, PIP remains ~37.  Attempts to remove fluid 

complicated by hypotension.  still on pressors for hypotension.


Vitals





Vital Signs








  Date Time  Temp Pulse Resp B/P (MAP) Pulse Ox O2 Delivery O2 Flow Rate FiO2


 


3/27/20 06:00  111 18 100/65 (77) 98 Ventilator  


 


3/27/20 04:00 98.8       





 98.8       


 


3/27/20 02:06       6.0 








HEENT:  Other (nc at perrl   bipap mask on   neck no lad   no thyromegaly)


Lungs:  Crackles


Cardiovascular:  S1, S2


Abdomen:  Other (distended,  diffuse pain   no mass)


Extremities:  No Edema


Skin:  Warm


Labs





Laboratory Tests








Test


 3/25/20


15:00 3/25/20


17:38 3/25/20


21:00 3/26/20


00:20


 


Sodium Level


 141 mmol/L


(136-145) 


 138 mmol/L


(136-145) 





 


Potassium Level


 3.8 mmol/L


(3.5-5.1) 


 3.7 mmol/L


(3.5-5.1) 





 


Chloride Level


 104 mmol/L


() 


 104 mmol/L


() 





 


Carbon Dioxide Level


 25 mmol/L


(21-32) 


 27 mmol/L


(21-32) 





 


Anion Gap 12 (6-14)   7 (6-14)  


 


Blood Urea Nitrogen


 49 mg/dL


(7-20) 


 44 mg/dL


(7-20) 





 


Creatinine


 2.5 mg/dL


(0.6-1.0) 


 2.0 mg/dL


(0.6-1.0) 





 


Estimated GFR


(Cockcroft-Gault) 20.5 


 


 26.5 


 





 


Glucose Level


 290 mg/dL


(70-99) 


 195 mg/dL


(70-99) 





 


Calcium Level


 7.4 mg/dL


(8.5-10.1) 


 7.5 mg/dL


(8.5-10.1) 





 


Phosphorus Level


 3.9 mg/dL


(2.6-4.7) 


 3.6 mg/dL


(2.6-4.7) 





 


Magnesium Level


 2.2 mg/dL


(1.8-2.4) 


 2.3 mg/dL


(1.8-2.4) 





 


Glucose (Fingerstick)


 


 253 mg/dL


(70-99) 


 210 mg/dL


(70-99)


 


Test


 3/26/20


05:20 3/26/20


05:32 3/26/20


07:45 3/26/20


10:30


 


White Blood Count


 23.8 x10^3/uL


(4.0-11.0) 


 


 





 


Red Blood Count


 1.97 x10^6/uL


(3.50-5.40) 


 


 





 


Hemoglobin


 6.5 g/dL


(12.0-15.5) 


 


 





 


Hematocrit


 19.3 %


(36.0-47.0) 


 


 





 


Mean Corpuscular Volume 98 fL ()    


 


Mean Corpuscular Hemoglobin 33 pg (25-35)    


 


Mean Corpuscular Hemoglobin


Concent 34 g/dL


(31-37) 


 


 





 


Red Cell Distribution Width


 13.8 %


(11.5-14.5) 


 


 





 


Platelet Count


 274 x10^3/uL


(140-400) 


 


 





 


Neutrophils (%) (Auto) 89 % (31-73)    


 


Lymphocytes (%) (Auto) 5 % (24-48)    


 


Monocytes (%) (Auto) 4 % (0-9)    


 


Eosinophils (%) (Auto) 1 % (0-3)    


 


Basophils (%) (Auto) 0 % (0-3)    


 


Neutrophils # (Auto)


 21.2 x10^3/uL


(1.8-7.7) 


 


 





 


Lymphocytes # (Auto)


 1.2 x10^3/uL


(1.0-4.8) 


 


 





 


Monocytes # (Auto)


 1.0 x10^3/uL


(0.0-1.1) 


 


 





 


Eosinophils # (Auto)


 0.3 x10^3/uL


(0.0-0.7) 


 


 





 


Basophils # (Auto)


 0.1 x10^3/uL


(0.0-0.2) 


 


 





 


Sodium Level


 137 mmol/L


(136-145) 


 


 





 


Potassium Level


 3.8 mmol/L


(3.5-5.1) 


 


 





 


Chloride Level


 103 mmol/L


() 


 


 





 


Carbon Dioxide Level


 22 mmol/L


(21-32) 


 


 





 


Anion Gap 12 (6-14)    


 


Blood Urea Nitrogen


 59 mg/dL


(7-20) 


 


 





 


Creatinine


 2.5 mg/dL


(0.6-1.0) 


 


 





 


Estimated GFR


(Cockcroft-Gault) 20.5 


 


 


 





 


BUN/Creatinine Ratio 24 (6-20)    


 


Glucose Level


 279 mg/dL


(70-99) 


 


 





 


Calcium Level


 7.7 mg/dL


(8.5-10.1) 


 


 





 


Total Bilirubin


 0.6 mg/dL


(0.2-1.0) 


 


 





 


Aspartate Amino Transf


(AST/SGOT) 37 U/L (15-37) 


 


 


 





 


Alanine Aminotransferase


(ALT/SGPT) 16 U/L (14-59) 


 


 


 





 


Alkaline Phosphatase


 83 U/L


() 


 


 





 


Total Protein


 4.3 g/dL


(6.4-8.2) 


 


 





 


Albumin


 1.9 g/dL


(3.4-5.0) 


 


 





 


Albumin/Globulin Ratio 0.8 (1.0-1.7)    


 


Glucose (Fingerstick)


 


 269 mg/dL


(70-99) 


 





 


O2 Saturation   96 % (92-99)  


 


Arterial Blood pH


 


 


 7.43


(7.35-7.45) 





 


Arterial Blood pCO2 at


Patient Temp 


 


 32 mmHg


(35-46) 





 


Arterial Blood pO2 at Patient


Temp 


 


 94 mmHg


() 





 


Arterial Blood HCO3


 


 


 21 mmol/L


(21-28) 





 


Arterial Blood Base Excess


 


 


 -3 mmol/L


(-3-3) 





 


FiO2   40%  


 


Influenza Type A Antigen


 


 


 


 Negative


(NEGATIVE)


 


Influenza Type B Antigen


 


 


 


 Negative


(NEGATIVE)


 


Test


 3/26/20


12:01 3/26/20


16:00 3/26/20


23:20 3/27/20


05:50


 


Glucose (Fingerstick)


 264 mg/dL


(70-99) 


 255 mg/dL


(70-99) 





 


Magnesium Level


 


 2.3 mg/dL


(1.8-2.4) 2.3 mg/dL


(1.8-2.4) 2.1 mg/dL


(1.8-2.4)


 


NT-Pro-B-Type Natriuretic


Peptide 


 106 pg/mL


(0-124) 


 





 


Sodium Level


 


 


 139 mmol/L


(136-145) 139 mmol/L


(136-145)


 


Potassium Level


 


 


 4.1 mmol/L


(3.5-5.1) 4.4 mmol/L


(3.5-5.1)


 


Chloride Level


 


 


 104 mmol/L


() 106 mmol/L


()


 


Carbon Dioxide Level


 


 


 25 mmol/L


(21-32) 25 mmol/L


(21-32)


 


Anion Gap   10 (6-14)  8 (6-14) 


 


Blood Urea Nitrogen


 


 


 48 mg/dL


(7-20) 44 mg/dL


(7-20)


 


Creatinine


 


 


 1.9 mg/dL


(0.6-1.0) 1.6 mg/dL


(0.6-1.0)


 


Estimated GFR


(Cockcroft-Gault) 


 


 28.1 


 34.3 





 


Glucose Level


 


 


 252 mg/dL


(70-99) 216 mg/dL


(70-99)


 


Calcium Level


 


 


 7.6 mg/dL


(8.5-10.1) 7.7 mg/dL


(8.5-10.1)


 


Phosphorus Level


 


 


 4.1 mg/dL


(2.6-4.7) 4.3 mg/dL


(2.6-4.7)


 


White Blood Count


 


 


 


 34.3 x10^3/uL


(4.0-11.0)


 


Red Blood Count


 


 


 


 2.51 x10^6/uL


(3.50-5.40)


 


Hemoglobin


 


 


 


 7.8 g/dL


(12.0-15.5)


 


Hematocrit


 


 


 


 24.4 %


(36.0-47.0)


 


Mean Corpuscular Volume    97 fL () 


 


Mean Corpuscular Hemoglobin    31 pg (25-35) 


 


Mean Corpuscular Hemoglobin


Concent 


 


 


 32 g/dL


(31-37)


 


Red Cell Distribution Width


 


 


 


 15.9 %


(11.5-14.5)


 


Platelet Count


 


 


 


 281 x10^3/uL


(140-400)


 


Neutrophils (%) (Auto)    91 % (31-73) 


 


Lymphocytes (%) (Auto)    4 % (24-48) 


 


Monocytes (%) (Auto)    3 % (0-9) 


 


Eosinophils (%) (Auto)    2 % (0-3) 


 


Basophils (%) (Auto)    0 % (0-3) 


 


Neutrophils # (Auto)


 


 


 


 31.3 x10^3/uL


(1.8-7.7)


 


Lymphocytes # (Auto)


 


 


 


 1.3 x10^3/uL


(1.0-4.8)


 


Monocytes # (Auto)


 


 


 


 1.1 x10^3/uL


(0.0-1.1)


 


Eosinophils # (Auto)


 


 


 


 0.6 x10^3/uL


(0.0-0.7)


 


Basophils # (Auto)


 


 


 


 0.1 x10^3/uL


(0.0-0.2)


 


BUN/Creatinine Ratio    28 (6-20) 


 


Total Bilirubin


 


 


 


 0.5 mg/dL


(0.2-1.0)


 


Aspartate Amino Transf


(AST/SGOT) 


 


 


 34 U/L (15-37) 





 


Alanine Aminotransferase


(ALT/SGPT) 


 


 


 18 U/L (14-59) 





 


Alkaline Phosphatase


 


 


 


 108 U/L


()


 


Total Protein


 


 


 


 4.5 g/dL


(6.4-8.2)


 


Albumin


 


 


 


 1.7 g/dL


(3.4-5.0)


 


Albumin/Globulin Ratio    0.6 (1.0-1.7) 


 


Triglycerides Level


 


 


 


 240 mg/dL


(0-150)


 


Test


 3/27/20


05:57 


 


 





 


Glucose (Fingerstick)


 225 mg/dL


(70-99) 


 


 











Laboratory Tests








Test


 3/26/20


10:30 3/26/20


12:01 3/26/20


16:00 3/26/20


23:20


 


Influenza Type A Antigen


 Negative


(NEGATIVE) 


 


 





 


Influenza Type B Antigen


 Negative


(NEGATIVE) 


 


 





 


Glucose (Fingerstick)


 


 264 mg/dL


(70-99) 


 255 mg/dL


(70-99)


 


Magnesium Level


 


 


 2.3 mg/dL


(1.8-2.4) 2.3 mg/dL


(1.8-2.4)


 


NT-Pro-B-Type Natriuretic


Peptide 


 


 106 pg/mL


(0-124) 





 


Sodium Level


 


 


 


 139 mmol/L


(136-145)


 


Potassium Level


 


 


 


 4.1 mmol/L


(3.5-5.1)


 


Chloride Level


 


 


 


 104 mmol/L


()


 


Carbon Dioxide Level


 


 


 


 25 mmol/L


(21-32)


 


Anion Gap    10 (6-14) 


 


Blood Urea Nitrogen


 


 


 


 48 mg/dL


(7-20)


 


Creatinine


 


 


 


 1.9 mg/dL


(0.6-1.0)


 


Estimated GFR


(Cockcroft-Gault) 


 


 


 28.1 





 


Glucose Level


 


 


 


 252 mg/dL


(70-99)


 


Calcium Level


 


 


 


 7.6 mg/dL


(8.5-10.1)


 


Phosphorus Level


 


 


 


 4.1 mg/dL


(2.6-4.7)


 


Test


 3/27/20


05:50 3/27/20


05:57 


 





 


White Blood Count


 34.3 x10^3/uL


(4.0-11.0) 


 


 





 


Red Blood Count


 2.51 x10^6/uL


(3.50-5.40) 


 


 





 


Hemoglobin


 7.8 g/dL


(12.0-15.5) 


 


 





 


Hematocrit


 24.4 %


(36.0-47.0) 


 


 





 


Mean Corpuscular Volume 97 fL ()    


 


Mean Corpuscular Hemoglobin 31 pg (25-35)    


 


Mean Corpuscular Hemoglobin


Concent 32 g/dL


(31-37) 


 


 





 


Red Cell Distribution Width


 15.9 %


(11.5-14.5) 


 


 





 


Platelet Count


 281 x10^3/uL


(140-400) 


 


 





 


Neutrophils (%) (Auto) 91 % (31-73)    


 


Lymphocytes (%) (Auto) 4 % (24-48)    


 


Monocytes (%) (Auto) 3 % (0-9)    


 


Eosinophils (%) (Auto) 2 % (0-3)    


 


Basophils (%) (Auto) 0 % (0-3)    


 


Neutrophils # (Auto)


 31.3 x10^3/uL


(1.8-7.7) 


 


 





 


Lymphocytes # (Auto)


 1.3 x10^3/uL


(1.0-4.8) 


 


 





 


Monocytes # (Auto)


 1.1 x10^3/uL


(0.0-1.1) 


 


 





 


Eosinophils # (Auto)


 0.6 x10^3/uL


(0.0-0.7) 


 


 





 


Basophils # (Auto)


 0.1 x10^3/uL


(0.0-0.2) 


 


 





 


Sodium Level


 139 mmol/L


(136-145) 


 


 





 


Potassium Level


 4.4 mmol/L


(3.5-5.1) 


 


 





 


Chloride Level


 106 mmol/L


() 


 


 





 


Carbon Dioxide Level


 25 mmol/L


(21-32) 


 


 





 


Anion Gap 8 (6-14)    


 


Blood Urea Nitrogen


 44 mg/dL


(7-20) 


 


 





 


Creatinine


 1.6 mg/dL


(0.6-1.0) 


 


 





 


Estimated GFR


(Cockcroft-Gault) 34.3 


 


 


 





 


BUN/Creatinine Ratio 28 (6-20)    


 


Glucose Level


 216 mg/dL


(70-99) 


 


 





 


Calcium Level


 7.7 mg/dL


(8.5-10.1) 


 


 





 


Phosphorus Level


 4.3 mg/dL


(2.6-4.7) 


 


 





 


Magnesium Level


 2.1 mg/dL


(1.8-2.4) 


 


 





 


Total Bilirubin


 0.5 mg/dL


(0.2-1.0) 


 


 





 


Aspartate Amino Transf


(AST/SGOT) 34 U/L (15-37) 


 


 


 





 


Alanine Aminotransferase


(ALT/SGPT) 18 U/L (14-59) 


 


 


 





 


Alkaline Phosphatase


 108 U/L


() 


 


 





 


Total Protein


 4.5 g/dL


(6.4-8.2) 


 


 





 


Albumin


 1.7 g/dL


(3.4-5.0) 


 


 





 


Albumin/Globulin Ratio 0.6 (1.0-1.7)    


 


Triglycerides Level


 240 mg/dL


(0-150) 


 


 





 


Glucose (Fingerstick)


 


 225 mg/dL


(70-99) 


 











Medications





Active Scripts








 Medications  Dose


 Route/Sig


 Max Daily Dose Days Date Category


 


 Bisoprolol


 Fumarate 5 Mg


 Tablet  10 Mg


 PO DAILY


   3/16/20 Reported








Comments


3/27 cxr


interstitial prominence/bilateral effusions relatively unchanged from yesterday,

worse from several days ago





Impression


.


IMPRESSION:


1.  Acute hypoxemic respiratory failure secondary to ARDS due toacute 

pancreatitis, sepsic shock, abdominal distention, and pneumonia.and pleural 

effusions.  Pleural effusions secondary to abdominal process and/or general 

volume overload from renal failure.  Gas exchane better compared to earlier, but

more effusions.


2.  Gallstone pancreatitis. WITH NECROSIS


3.  Severe metabolic acidosis.


4.  Acute kidney injury. ON CRRT


5.  Acute gallstone pancreatitis.


6.  Hypoalbuminemia.


7.  Hypocalcemia.


8.  Leukocytosis


9.  Chronic anemia





Plan


.


Cont AC mode , 40 FIO2/ 8 PEEP.  


Poor prognosis/ ARDS/septic shock


supportive care


CRRT


Repeat CT once stable


Antibiotics per ID


Follow surgery input


Follow nephrology input


Nutritional support with TPN


Prognosis is extremely poor


d/w RN/





time spent reviewing chart, labs, xrays, examining patinet and managing 

ventilator 30 min.











ISATU DAVENPORT MD              Mar 27, 2020 09:49

## 2020-03-27 NOTE — EEG
DATE OF SERVICE:  03/27/2020



EEG NUMBER:  .



OBJECTIVE:  The patient is a 49-year-old female with severe encephalopathy and

possible seizure activity.



DESCRIPTION:  This is a digital study.  Electrodes are placed according to the

international 10-20 system.  Bipolar and referential montages are available. 

Activation procedures typically include hyperventilation and intermittent photic

stimulation.



INTERPRETATION:  The background consists of 2-5 Hz, 20-50 microvolt activity. 

There is no reactivity.  Hyperventilation is not performed, intermittent photic

stimulation is noncontributory.  Sleep is not achieved.



IMPRESSION:  This electroencephalogram with the patient in an obtunded state

shows a moderate-severe disturbance of cerebral activity consistent with any of

a variety of toxic or metabolic encephalopathies.  There is no focal,

paroxysmal, or epileptiform activity.



Thank you for letting us help with the patient's care.

 



______________________________

CLEMENTINA PANTOJA MD



DR:  FRANKIE/jakob  JOB#:  [id number] / [id number]

DD:  03/27/2020 13:56  DT:  03/27/2020 13:59

## 2020-03-27 NOTE — RAD
Chest AP portable at 0709:

 

Reason for examination: Ventilator patient.

 

Comparison is made to previous study dated 3/20/2008.

 

Endotracheal tube, NG tube, dialysis catheter, left central venous 

catheter and right PICC line remain in place. Inspiratory effort is poor. 

Heart and mediastinum are unremarkable. Lung fields show continued 

presence of bilateral interstitial and alveolar opacities which are 

predominantly basilar as well as small bilateral pleural effusions. No 

acute disease is seen.

 

IMPRESSION:

 

Poor inspiratory effort with continued presence of bilateral interstitial 

and alveolar opacities and small pleural effusions. No significant change 

has occurred.

 

Electronically signed by: Ladan Mckenna MD (3/27/2020 8:11 AM) [id number]

## 2020-03-27 NOTE — PDOC
SURGICAL PROGRESS NOTE


Subjective


Pt intubated, sedated, remains critical


Vital Signs





Vital Signs








  Date Time  Temp Pulse Resp B/P (MAP) Pulse Ox O2 Delivery O2 Flow Rate FiO2


 


3/27/20 14:23  102 26 109/62 (78) 100 Ventilator  


 


3/27/20 12:00 99.4       





 99.4       


 


3/27/20 02:06       6.0 








I&O











Intake and Output 


 


 3/27/20





 06:59


 


Intake Total 2741.47 ml


 


Output Total 590 ml


 


Balance 2151.47 ml


 


 


 


IV Total 2391.47 ml


 


Blood Product 300 ml


 


Blood Product IV Normal Saline Flush 50 ml


 


Output Urine Total 590 ml








PATIENT HAS A VILLASENOR:  Yes (accurate i and os)


Abdomen:  Soft, Other (distended, obese, no obvious TTP)


Labs





Laboratory Tests








Test


 3/25/20


17:38 3/25/20


21:00 3/26/20


00:20 3/26/20


05:20


 


Glucose (Fingerstick)


 253 mg/dL


(70-99) 


 210 mg/dL


(70-99) 





 


Sodium Level


 


 138 mmol/L


(136-145) 


 137 mmol/L


(136-145)


 


Potassium Level


 


 3.7 mmol/L


(3.5-5.1) 


 3.8 mmol/L


(3.5-5.1)


 


Chloride Level


 


 104 mmol/L


() 


 103 mmol/L


()


 


Carbon Dioxide Level


 


 27 mmol/L


(21-32) 


 22 mmol/L


(21-32)


 


Anion Gap  7 (6-14)   12 (6-14) 


 


Blood Urea Nitrogen


 


 44 mg/dL


(7-20) 


 59 mg/dL


(7-20)


 


Creatinine


 


 2.0 mg/dL


(0.6-1.0) 


 2.5 mg/dL


(0.6-1.0)


 


Estimated GFR


(Cockcroft-Gault) 


 26.5 


 


 20.5 





 


Glucose Level


 


 195 mg/dL


(70-99) 


 279 mg/dL


(70-99)


 


Calcium Level


 


 7.5 mg/dL


(8.5-10.1) 


 7.7 mg/dL


(8.5-10.1)


 


Phosphorus Level


 


 3.6 mg/dL


(2.6-4.7) 


 





 


Magnesium Level


 


 2.3 mg/dL


(1.8-2.4) 


 





 


White Blood Count


 


 


 


 23.8 x10^3/uL


(4.0-11.0)


 


Red Blood Count


 


 


 


 1.97 x10^6/uL


(3.50-5.40)


 


Hemoglobin


 


 


 


 6.5 g/dL


(12.0-15.5)


 


Hematocrit


 


 


 


 19.3 %


(36.0-47.0)


 


Mean Corpuscular Volume    98 fL () 


 


Mean Corpuscular Hemoglobin    33 pg (25-35) 


 


Mean Corpuscular Hemoglobin


Concent 


 


 


 34 g/dL


(31-37)


 


Red Cell Distribution Width


 


 


 


 13.8 %


(11.5-14.5)


 


Platelet Count


 


 


 


 274 x10^3/uL


(140-400)


 


Neutrophils (%) (Auto)    89 % (31-73) 


 


Lymphocytes (%) (Auto)    5 % (24-48) 


 


Monocytes (%) (Auto)    4 % (0-9) 


 


Eosinophils (%) (Auto)    1 % (0-3) 


 


Basophils (%) (Auto)    0 % (0-3) 


 


Neutrophils # (Auto)


 


 


 


 21.2 x10^3/uL


(1.8-7.7)


 


Lymphocytes # (Auto)


 


 


 


 1.2 x10^3/uL


(1.0-4.8)


 


Monocytes # (Auto)


 


 


 


 1.0 x10^3/uL


(0.0-1.1)


 


Eosinophils # (Auto)


 


 


 


 0.3 x10^3/uL


(0.0-0.7)


 


Basophils # (Auto)


 


 


 


 0.1 x10^3/uL


(0.0-0.2)


 


BUN/Creatinine Ratio    24 (6-20) 


 


Total Bilirubin


 


 


 


 0.6 mg/dL


(0.2-1.0)


 


Aspartate Amino Transf


(AST/SGOT) 


 


 


 37 U/L (15-37) 





 


Alanine Aminotransferase


(ALT/SGPT) 


 


 


 16 U/L (14-59) 





 


Alkaline Phosphatase


 


 


 


 83 U/L


()


 


Total Protein


 


 


 


 4.3 g/dL


(6.4-8.2)


 


Albumin


 


 


 


 1.9 g/dL


(3.4-5.0)


 


Albumin/Globulin Ratio    0.8 (1.0-1.7) 


 


Test


 3/26/20


05:32 3/26/20


07:45 3/26/20


10:30 3/26/20


12:01


 


Glucose (Fingerstick)


 269 mg/dL


(70-99) 


 


 264 mg/dL


(70-99)


 


O2 Saturation  96 % (92-99)   


 


Arterial Blood pH


 


 7.43


(7.35-7.45) 


 





 


Arterial Blood pCO2 at


Patient Temp 


 32 mmHg


(35-46) 


 





 


Arterial Blood pO2 at Patient


Temp 


 94 mmHg


() 


 





 


Arterial Blood HCO3


 


 21 mmol/L


(21-28) 


 





 


Arterial Blood Base Excess


 


 -3 mmol/L


(-3-3) 


 





 


FiO2  40%   


 


Influenza Type A Antigen


 


 


 Negative


(NEGATIVE) 





 


Influenza Type B Antigen


 


 


 Negative


(NEGATIVE) 





 


Test


 3/26/20


16:00 3/26/20


23:20 3/27/20


05:50 3/27/20


05:57


 


Magnesium Level


 2.3 mg/dL


(1.8-2.4) 2.3 mg/dL


(1.8-2.4) 2.1 mg/dL


(1.8-2.4) 





 


NT-Pro-B-Type Natriuretic


Peptide 106 pg/mL


(0-124) 


 


 





 


Sodium Level


 


 139 mmol/L


(136-145) 139 mmol/L


(136-145) 





 


Potassium Level


 


 4.1 mmol/L


(3.5-5.1) 4.4 mmol/L


(3.5-5.1) 





 


Chloride Level


 


 104 mmol/L


() 106 mmol/L


() 





 


Carbon Dioxide Level


 


 25 mmol/L


(21-32) 25 mmol/L


(21-32) 





 


Anion Gap  10 (6-14)  8 (6-14)  


 


Blood Urea Nitrogen


 


 48 mg/dL


(7-20) 44 mg/dL


(7-20) 





 


Creatinine


 


 1.9 mg/dL


(0.6-1.0) 1.6 mg/dL


(0.6-1.0) 





 


Estimated GFR


(Cockcroft-Gault) 


 28.1 


 34.3 


 





 


Glucose Level


 


 252 mg/dL


(70-99) 216 mg/dL


(70-99) 





 


Glucose (Fingerstick)


 


 255 mg/dL


(70-99) 


 225 mg/dL


(70-99)


 


Calcium Level


 


 7.6 mg/dL


(8.5-10.1) 7.7 mg/dL


(8.5-10.1) 





 


Phosphorus Level


 


 4.1 mg/dL


(2.6-4.7) 4.3 mg/dL


(2.6-4.7) 





 


White Blood Count


 


 


 34.3 x10^3/uL


(4.0-11.0) 





 


Red Blood Count


 


 


 2.51 x10^6/uL


(3.50-5.40) 





 


Hemoglobin


 


 


 7.8 g/dL


(12.0-15.5) 





 


Hematocrit


 


 


 24.4 %


(36.0-47.0) 





 


Mean Corpuscular Volume   97 fL ()  


 


Mean Corpuscular Hemoglobin   31 pg (25-35)  


 


Mean Corpuscular Hemoglobin


Concent 


 


 32 g/dL


(31-37) 





 


Red Cell Distribution Width


 


 


 15.9 %


(11.5-14.5) 





 


Platelet Count


 


 


 281 x10^3/uL


(140-400) 





 


Neutrophils (%) (Auto)   91 % (31-73)  


 


Lymphocytes (%) (Auto)   4 % (24-48)  


 


Monocytes (%) (Auto)   3 % (0-9)  


 


Eosinophils (%) (Auto)   2 % (0-3)  


 


Basophils (%) (Auto)   0 % (0-3)  


 


Neutrophils # (Auto)


 


 


 31.3 x10^3/uL


(1.8-7.7) 





 


Lymphocytes # (Auto)


 


 


 1.3 x10^3/uL


(1.0-4.8) 





 


Monocytes # (Auto)


 


 


 1.1 x10^3/uL


(0.0-1.1) 





 


Eosinophils # (Auto)


 


 


 0.6 x10^3/uL


(0.0-0.7) 





 


Basophils # (Auto)


 


 


 0.1 x10^3/uL


(0.0-0.2) 





 


BUN/Creatinine Ratio   28 (6-20)  


 


Total Bilirubin


 


 


 0.5 mg/dL


(0.2-1.0) 





 


Aspartate Amino Transf


(AST/SGOT) 


 


 34 U/L (15-37) 


 





 


Alanine Aminotransferase


(ALT/SGPT) 


 


 18 U/L (14-59) 


 





 


Alkaline Phosphatase


 


 


 108 U/L


() 





 


Total Protein


 


 


 4.5 g/dL


(6.4-8.2) 





 


Albumin


 


 


 1.7 g/dL


(3.4-5.0) 





 


Albumin/Globulin Ratio   0.6 (1.0-1.7)  


 


Triglycerides Level


 


 


 240 mg/dL


(0-150) 





 


Test


 3/27/20


08:00 3/27/20


09:15 3/27/20


12:15 





 


O2 Saturation 97 % (92-99)    


 


Arterial Blood pH


 7.33


(7.35-7.45) 


 


 





 


Arterial Blood pCO2 at


Patient Temp 40 mmHg


(35-46) 


 


 





 


Arterial Blood pO2 at Patient


Temp 110 mmHg


() 


 


 





 


Arterial Blood HCO3


 21 mmol/L


(21-28) 


 


 





 


Arterial Blood Base Excess


 -5 mmol/L


(-3-3) 


 


 





 


FiO2 40%    


 


Sodium Level


 


 138 mmol/L


(136-145) 138 mmol/L


(136-145) 





 


Potassium Level


 


 4.3 mmol/L


(3.5-5.1) 4.4 mmol/L


(3.5-5.1) 





 


Chloride Level


 


 105 mmol/L


() 104 mmol/L


() 





 


Carbon Dioxide Level


 


 24 mmol/L


(21-32) 24 mmol/L


(21-32) 





 


Anion Gap  9 (6-14)  10 (6-14)  


 


Blood Urea Nitrogen


 


 44 mg/dL


(7-20) 45 mg/dL


(7-20) 





 


Creatinine


 


 1.7 mg/dL


(0.6-1.0) 1.8 mg/dL


(0.6-1.0) 





 


Estimated GFR


(Cockcroft-Gault) 


 31.9 


 29.9 


 





 


Glucose Level


 


 250 mg/dL


(70-99) 261 mg/dL


(70-99) 





 


Calcium Level


 


 7.8 mg/dL


(8.5-10.1) 7.9 mg/dL


(8.5-10.1) 





 


Magnesium Level


 


 2.2 mg/dL


(1.8-2.4) 2.1 mg/dL


(1.8-2.4) 





 


Phosphorus Level


 


 


 3.8 mg/dL


(2.6-4.7) 











Laboratory Tests








Test


 3/26/20


16:00 3/26/20


23:20 3/27/20


05:50 3/27/20


05:57


 


Magnesium Level


 2.3 mg/dL


(1.8-2.4) 2.3 mg/dL


(1.8-2.4) 2.1 mg/dL


(1.8-2.4) 





 


NT-Pro-B-Type Natriuretic


Peptide 106 pg/mL


(0-124) 


 


 





 


Sodium Level


 


 139 mmol/L


(136-145) 139 mmol/L


(136-145) 





 


Potassium Level


 


 4.1 mmol/L


(3.5-5.1) 4.4 mmol/L


(3.5-5.1) 





 


Chloride Level


 


 104 mmol/L


() 106 mmol/L


() 





 


Carbon Dioxide Level


 


 25 mmol/L


(21-32) 25 mmol/L


(21-32) 





 


Anion Gap  10 (6-14)  8 (6-14)  


 


Blood Urea Nitrogen


 


 48 mg/dL


(7-20) 44 mg/dL


(7-20) 





 


Creatinine


 


 1.9 mg/dL


(0.6-1.0) 1.6 mg/dL


(0.6-1.0) 





 


Estimated GFR


(Cockcroft-Gault) 


 28.1 


 34.3 


 





 


Glucose Level


 


 252 mg/dL


(70-99) 216 mg/dL


(70-99) 





 


Glucose (Fingerstick)


 


 255 mg/dL


(70-99) 


 225 mg/dL


(70-99)


 


Calcium Level


 


 7.6 mg/dL


(8.5-10.1) 7.7 mg/dL


(8.5-10.1) 





 


Phosphorus Level


 


 4.1 mg/dL


(2.6-4.7) 4.3 mg/dL


(2.6-4.7) 





 


White Blood Count


 


 


 34.3 x10^3/uL


(4.0-11.0) 





 


Red Blood Count


 


 


 2.51 x10^6/uL


(3.50-5.40) 





 


Hemoglobin


 


 


 7.8 g/dL


(12.0-15.5) 





 


Hematocrit


 


 


 24.4 %


(36.0-47.0) 





 


Mean Corpuscular Volume   97 fL ()  


 


Mean Corpuscular Hemoglobin   31 pg (25-35)  


 


Mean Corpuscular Hemoglobin


Concent 


 


 32 g/dL


(31-37) 





 


Red Cell Distribution Width


 


 


 15.9 %


(11.5-14.5) 





 


Platelet Count


 


 


 281 x10^3/uL


(140-400) 





 


Neutrophils (%) (Auto)   91 % (31-73)  


 


Lymphocytes (%) (Auto)   4 % (24-48)  


 


Monocytes (%) (Auto)   3 % (0-9)  


 


Eosinophils (%) (Auto)   2 % (0-3)  


 


Basophils (%) (Auto)   0 % (0-3)  


 


Neutrophils # (Auto)


 


 


 31.3 x10^3/uL


(1.8-7.7) 





 


Lymphocytes # (Auto)


 


 


 1.3 x10^3/uL


(1.0-4.8) 





 


Monocytes # (Auto)


 


 


 1.1 x10^3/uL


(0.0-1.1) 





 


Eosinophils # (Auto)


 


 


 0.6 x10^3/uL


(0.0-0.7) 





 


Basophils # (Auto)


 


 


 0.1 x10^3/uL


(0.0-0.2) 





 


BUN/Creatinine Ratio   28 (6-20)  


 


Total Bilirubin


 


 


 0.5 mg/dL


(0.2-1.0) 





 


Aspartate Amino Transf


(AST/SGOT) 


 


 34 U/L (15-37) 


 





 


Alanine Aminotransferase


(ALT/SGPT) 


 


 18 U/L (14-59) 


 





 


Alkaline Phosphatase


 


 


 108 U/L


() 





 


Total Protein


 


 


 4.5 g/dL


(6.4-8.2) 





 


Albumin


 


 


 1.7 g/dL


(3.4-5.0) 





 


Albumin/Globulin Ratio   0.6 (1.0-1.7)  


 


Triglycerides Level


 


 


 240 mg/dL


(0-150) 





 


Test


 3/27/20


08:00 3/27/20


09:15 3/27/20


12:15 





 


O2 Saturation 97 % (92-99)    


 


Arterial Blood pH


 7.33


(7.35-7.45) 


 


 





 


Arterial Blood pCO2 at


Patient Temp 40 mmHg


(35-46) 


 


 





 


Arterial Blood pO2 at Patient


Temp 110 mmHg


() 


 


 





 


Arterial Blood HCO3


 21 mmol/L


(21-28) 


 


 





 


Arterial Blood Base Excess


 -5 mmol/L


(-3-3) 


 


 





 


FiO2 40%    


 


Sodium Level


 


 138 mmol/L


(136-145) 138 mmol/L


(136-145) 





 


Potassium Level


 


 4.3 mmol/L


(3.5-5.1) 4.4 mmol/L


(3.5-5.1) 





 


Chloride Level


 


 105 mmol/L


() 104 mmol/L


() 





 


Carbon Dioxide Level


 


 24 mmol/L


(21-32) 24 mmol/L


(21-32) 





 


Anion Gap  9 (6-14)  10 (6-14)  


 


Blood Urea Nitrogen


 


 44 mg/dL


(7-20) 45 mg/dL


(7-20) 





 


Creatinine


 


 1.7 mg/dL


(0.6-1.0) 1.8 mg/dL


(0.6-1.0) 





 


Estimated GFR


(Cockcroft-Gault) 


 31.9 


 29.9 


 





 


Glucose Level


 


 250 mg/dL


(70-99) 261 mg/dL


(70-99) 





 


Calcium Level


 


 7.8 mg/dL


(8.5-10.1) 7.9 mg/dL


(8.5-10.1) 





 


Magnesium Level


 


 2.2 mg/dL


(1.8-2.4) 2.1 mg/dL


(1.8-2.4) 





 


Phosphorus Level


 


 


 3.8 mg/dL


(2.6-4.7) 











Problem List


Problems


Medical Problems:


(1) Acute pancreatitis


Status: Acute  





(2) Cholelithiasis


Status: Acute  








Assessment/Plan


pt remains critical


prohibitive surgical candidate at this time and under covid precautions


will remain available, but not exam pt over weekend, to save PPE











GAMAL ZHOU MD             Mar 27, 2020 15:03

## 2020-03-27 NOTE — PDOC
SUBJECTIVE


ROS


Intubated , off sedation , not waking up





OBJECTIVE


Vital Signs





Vital Signs








  Date Time  Temp Pulse Resp B/P (MAP) Pulse Ox O2 Delivery O2 Flow Rate FiO2


 


3/27/20 06:00  111 18 100/65 (77) 98 Ventilator  


 


3/27/20 04:00 98.8       





 98.8       


 


3/27/20 02:06       6.0 








I & 0











Intake and Output 


 


 3/27/20





 07:00


 


Intake Total 2741.47 ml


 


Output Total 570 ml


 


Balance 2171.47 ml


 


 


 


IV Total 2391.47 ml


 


Blood Product 300 ml


 


Blood Product IV Normal Saline Flush 50 ml


 


Output Urine Total 570 ml











PHYSICAL EXAM


Physical Exam


GENERAL: Intubated on MV 


HEENT: Mild icterus, Intubated  


NECK:  Supple. 


LUNGS:  Decreased breath sounds at the bases.


HEART:  S1, S2, tachycardia, 110s,


ABDOMEN:  Distended, + BS. Rectal tube in place


: [place]  +


EXTREMITIES: Trace edema, no cyanosis - mottled.


DERMATOLOGIC:  Warm and dry.  No generalized rash.  


CENTRAL NERVOUS SYSTEM: Intubated  on  


RUE-PICC, RIJ/Temp HDC & LIJ





DIAGNOSIS/ASSESSMENT


Assessment & Plan


JED - ATN, , requiring  RRT, some improvement in uop  


 intubated on 3/23 , currently on  pressors


initially on HD , Switched to CRRT 3/23 seen on CRRT, tolerating well (Temp HDC 

replaced today 2/2 nonfunctional ) 


Discussed with RN and Robert  





HyperKalemia- normal  





 Acidosis -  Bicarb Normal- on higher side ,  Dced  IV bicarb  on 3/23


Currently on  TPN with bicarb  





HypoCalcemia- Corrected Ca normal  





Hypoalbuminemia- severe  





HypOPhos -Replace as needed  





 Acute pancreatitis - with necrosis/infection, likely gallstone pancreatitis. 

GI, ID, and general surgery  following  





Calculus of gallbladder with acute cholecystitis without obstruction - with 

secondary pancreatitis. Lap naif is indicated, will need to await pancreatitis 

resolution





HTN - Hypotensive currently and on pressors  





Sepsis - with acute pancreatitis as end organ dysfunction, sign of infection, 

zyvox, merrem





Hypoxia with respiratory failure - intubated





COMMENT/RELEVANT DATA


Meds





Current Medications








 Medications


  (Trade)  Dose


 Ordered  Sig/Yee  Start Time


 Stop Time Status Last Admin


Dose Admin


 


 Acetaminophen


  (Tylenol Supp)  650 mg  PRN Q6HRS  PRN  3/24/20 10:30


    3/24/20 10:37


650 MG


 


 Acetaminophen


  (Tylenol)  650 mg  PRN Q6HRS  PRN  3/21/20 03:36


    3/26/20 07:35


650 MG


 


 Albumin Human  500 ml @ 


 125 mls/hr  1X  ONCE  3/26/20 14:15


 3/26/20 18:14 DC  





 


 Albuterol Sulfate


  (Ventolin Neb


 Soln)  2.5 mg  1X  ONCE  3/17/20 22:30


 3/17/20 22:31 DC 3/18/20 00:56


2.5 MG


 


 Artificial Tears


  (Artificial


 Tears)  1 drop  PRN Q1HR  PRN  3/23/20 08:15


     





 


 Atenolol


  (Tenormin)  100 mg  DAILY  3/17/20 09:00


 3/16/20 20:08 DC  





 


 Benzocaine


  (Hurricaine One)  1 spray  1X  ONCE  3/20/20 14:30


 3/20/20 14:31 DC 3/20/20 16:38


1 SPRAY


 


 Calcium Carbonate/


 Glycine


  (Tums)  500 mg  PRN [id number]  PRN  3/18/20 17:45


     





 


 Calcium Chloride


 1000 mg/Sodium


 Chloride  110 ml @ 


 220 mls/hr  1X  ONCE  3/17/20 22:30


 3/17/20 22:59 DC 3/17/20 22:11


220 MLS/HR


 


 Calcium Chloride


 3000 mg/Sodium


 Chloride  1,030 ml @ 


 50 mls/hr  [id number]  3/19/20 08:00


 3/21/20 15:23 DC 3/21/20 02:17


50 MLS/HR


 


 Calcium Gluconate


  (Calcium


 Gluconate)  2,000 mg  1X  ONCE  3/19/20 02:15


 3/19/20 02:16 DC 3/19/20 02:19


2,000 MG


 


 Calcium Gluconate


 1000 mg/Sodium


 Chloride  110 ml @ 


 220 mls/hr  1X  ONCE  3/18/20 03:30


 3/18/20 03:59 DC 3/18/20 03:21


220 MLS/HR


 


 Calcium Gluconate


 2000 mg/Sodium


 Chloride  120 ml @ 


 220 mls/hr  1X  ONCE  3/18/20 07:30


 3/18/20 08:02 DC 3/18/20 09:05


220 MLS/HR


 


 Cefepime HCl


  (Maxipime)  2 gm  Q12HR  3/25/20 09:00


    3/27/20 09:00


2 GM


 


 Daptomycin 500 mg/


 Sodium Chloride  50 ml @ 


 100 mls/hr  Q48H  3/25/20 08:30


    3/27/20 09:45


100 MLS/HR


 


 Dextrose


  (Dextrose


 50%-Water Syringe)  12.5 gm  PRN Q15MIN  PRN  3/16/20 09:30


     





 


 Digoxin


  (Lanoxin)  125 mcg  1X  ONCE  3/19/20 18:00


 3/19/20 18:01 DC 3/19/20 17:10


125 MCG


 


 Diphenhydramine


 HCl


  (Benadryl)  25 mg  1X PRN  PRN  3/23/20 07:30


 3/24/20 07:29 DC  





 


 Etomidate


  (Amidate)  8 mg  1X  ONCE  3/23/20 08:30


 3/23/20 08:31 DC 3/23/20 08:33


8 MG


 


 Fentanyl Citrate  30 ml @ 0


 mls/hr  CONT  PRN  3/23/20 08:15


    3/27/20 00:53


3.75 MLS/HR


 


 Fentanyl Citrate


  (Fentanyl 2ml


 Vial)  100 mcg  STK-MED ONCE  3/16/20 03:18


 3/16/20 03:18 DC  





 


 Furosemide


  (Lasix)  40 mg  1X  ONCE  3/17/20 22:30


 3/17/20 22:31 DC 3/17/20 22:12


40 MG


 


 Heparin Sodium


  (Porcine)


  (Heparin Sodium)  5,000 unit  Q8HRS  3/23/20 15:00


    3/26/20 05:35


5,000 UNIT


 


 Hydromorphone HCl


  (Dilaudid)  1 mg  PRN Q3HRS  PRN  3/17/20 12:00


    3/23/20 05:13


1 MG


 


 Info


  (CONTRAST GIVEN


 -- Rx MONITORING)  1 each  PRN DAILY  PRN  3/17/20 17:00


 3/19/20 16:59 DC  





 


 Info


  (PHARMACY


 MONITORING -- do


 not chart)  1 each  PRN DAILY  PRN  3/23/20 07:30


     





 


 Info


  (Tpn Per


 Pharmacy)  1 each  PRN DAILY  PRN  3/18/20 12:30


   UNV  





 


 Insulin Human


 Lispro


  (HumaLOG)  0-9 UNITS  Q6HRS  3/16/20 09:30


    3/27/20 06:01


5 UNITS


 


 Insulin Human


 Regular


  (HumuLIN R VIAL)  5 unit  1X  ONCE  3/17/20 22:30


 3/17/20 22:31 DC 3/17/20 22:14


5 UNIT


 


 Iohexol


  (Omnipaque 300


 Mg/ml)  60 ml  1X  ONCE  3/17/20 17:00


 3/17/20 17:01 DC 3/17/20 17:20


60 ML


 


 Iohexol


  (Omnipaque 350


 Mg/ml)  90 ml  1X  ONCE  3/16/20 03:30


 3/16/20 03:31 DC 3/16/20 03:25


90 ML


 


 Ketorolac


 Tromethamine


  (Toradol 30mg


 Vial)  30 mg  1X  ONCE  3/16/20 03:00


 3/16/20 03:01 DC 3/16/20 02:54


30 MG


 


 Lidocaine HCl


  (Buffered


 Lidocaine 1%)  3 ml  1X  ONCE  3/25/20 10:30


 3/25/20 10:31 DC 3/25/20 10:27


3 ML


 


 Lidocaine HCl


  (Glydo


  (Lidocaine) Jelly)  1 thomas  1X  ONCE  3/20/20 14:30


 3/20/20 14:31 DC 3/20/20 16:38


1 THOMAS


 


 Lidocaine HCl


  (Xylocaine-Mpf


 1% 2ml Vial)  2 ml  STK-MED ONCE  3/18/20 08:47


 3/18/20 08:47 DC  





 


 Linezolid/Dextrose  300 ml @ 


 300 mls/hr  Q12HR  3/20/20 20:00


 3/27/20 07:50 DC 3/26/20 21:04


300 MLS/HR


 


 Lorazepam


  (Ativan Inj)  1 mg  PRN Q4HRS  PRN  3/19/20 09:00


    3/23/20 00:34


1 MG


 


 Magnesium Sulfate  100 ml @ 


 25 mls/hr  1X  ONCE  3/19/20 13:00


 3/19/20 16:59 DC 3/19/20 12:48


25 MLS/HR


 


 Meropenem 1 gm/


 Sodium Chloride  100 ml @ 


 200 mls/hr  Q8HRS  3/17/20 20:00


 3/18/20 08:48 DC 3/18/20 05:45


200 MLS/HR


 


 Meropenem 500 mg/


 Sodium Chloride  50 ml @ 


 100 mls/hr  Q6HRS  3/24/20 09:00


 3/25/20 07:29 DC 3/25/20 06:00


100 MLS/HR


 


 Metoprolol


 Tartrate


  (Lopressor Vial)  5 mg  Q6HRS  3/17/20 10:15


    3/26/20 00:12


5 MG


 


 Metronidazole  100 ml @ 


 100 mls/hr  Q6HRS  3/23/20 08:30


    3/27/20 05:59


100 MLS/HR


 


 Micafungin Sodium


 100 mg/Dextrose  100 ml @ 


 100 mls/hr  Q24H  3/23/20 09:00


    3/27/20 09:01


100 MLS/HR


 


 Midazolam HCl


  (Versed)  5 mg  1X  ONCE  3/23/20 08:30


 3/23/20 08:31 DC  





 


 Midazolam HCl 50


 mg/Sodium Chloride  50 ml @ 0


 mls/hr  CONT  PRN  3/23/20 08:15


    3/26/20 22:39


7 MLS/HR


 


 Morphine Sulfate


  (Morphine


 Sulfate)  2 mg  PRN Q2HR  PRN  3/16/20 05:00


 3/17/20 14:15 DC 3/17/20 12:26


2 MG


 


 Multi-Ingred


 Cream/Lotion/Oil/


 Oint


  (Artificial


 Tears Eye


 Ointment)  1 thomas  PRN Q1HR  PRN  3/25/20 17:30


     





 


 Norepinephrine


 Bitartrate 8 mg/


 Dextrose  258 ml @ 


 17.299 mls/


 hr  CONT  PRN  3/17/20 15:30


    3/26/20 01:42


10.379 MLS/HR


 


 Ondansetron HCl


  (Zofran)  4 mg  PRN Q4HRS  PRN  3/16/20 09:30


    3/21/20 16:15


4 MG


 


 Pantoprazole


 Sodium


  (PROTONIX VIAL


 for IV PUSH)  40 mg  DAILYAC  3/16/20 11:30


    3/27/20 09:02


40 MG


 


 Piperacillin Sod/


 Tazobactam Sod


 4.5 gm/Sodium


 Chloride  100 ml @ 


 200 mls/hr  1X  ONCE  3/16/20 06:00


 3/16/20 06:29 DC 3/16/20 05:44


200 MLS/HR


 


 Potassium


 Chloride 15 meq/


 Bicarbonate


 Dialysis Soln w/


 out KCl  5,007.5 ml


  @ 1,000 mls/


 hr  Q5H1M  3/23/20 12:00


 3/24/20 11:19 DC 3/24/20 11:11


1,000 MLS/HR


 


 Potassium


 Chloride 20 meq/


 Bicarbonate


 Dialysis Soln w/


 out KCl  5,010 ml @ 


 1,000 mls/hr  Q5H1M  3/25/20 16:00


    3/27/20 09:04


1,000 MLS/HR


 


 Potassium


 Chloride/Water  100 ml @ 


 100 mls/hr  Q1H  3/24/20 11:00


 3/24/20 12:59 DC 3/24/20 12:12


100 MLS/HR


 


 Potassium


 Phosphate 20 mmol/


 Sodium Chloride  106.6667


 ml @ 


 51.667 m...  1X  ONCE  3/25/20 13:00


 3/25/20 15:03 DC 3/25/20 12:51


51.667 MLS/HR


 


 Prochlorperazine


 Edisylate


  (Compazine)  10 mg  PRN Q6HRS  PRN  3/16/20 17:45


    3/17/20 00:42


10 MG


 


 Propofol  0 ml @ As


 Directed  STK-MED ONCE  3/23/20 07:53


 3/23/20 07:53 DC  





 


 Sodium


 Bicarbonate 50


 meq/Sodium


 Chloride  1,050 ml @ 


 75 mls/hr  Q14H  3/18/20 07:30


 3/23/20 10:28 DC 3/22/20 21:10


75 MLS/HR


 


 Sodium Chloride


  (Normal Saline


 Flush)  10 ml  1X PRN  PRN  3/21/20 08:00


 3/22/20 07:59 DC  





 


 Sodium Chloride


 90 meq/Calcium


 Gluconate 10 meq/


 Multivitamins 10


 ml/Chromium/


 Copper/Manganese/


 Seleni/Zn 0.5 ml/


 Total Parenteral


 Nutrition/Amino


 Acids/Dextrose/


 Fat Emulsion


 Intravenous  1,512 ml @ 


 63 mls/hr  TPN  CONT  3/18/20 22:00


 3/19/20 21:59 DC 3/18/20 22:06


63 MLS/HR


 


 Sodium Chloride


 90 meq/Calcium


 Gluconate 10 meq/


 Multivitamins 10


 ml/Chromium/


 Copper/Manganese/


 Seleni/Zn 1 ml/


 Total Parenteral


 Nutrition/Amino


 Acids/Dextrose/


 Fat Emulsion


 Intravenous  55.005 ml


  @ 2.292


 mls/hr  TPN  CONT  3/18/20 22:00


 3/18/20 12:33 DC  





 


 Sodium Chloride


 90 meq/Magnesium


 Sulfate 10 meq/


 Calcium Gluconate


 20 meq/


 Multivitamins 10


 ml/Chromium/


 Copper/Manganese/


 Seleni/Zn 0.5 ml/


 Total Parenteral


 Nutrition/Amino


 Acids/Dextrose/


 Fat Emulsion


 Intravenous  1,512 ml @ 


 63 mls/hr  TPN  CONT  3/19/20 22:00


 3/20/20 21:59 DC 3/19/20 22:25


63 MLS/HR


 


 Sodium Chloride


 90 meq/Potassium


 Chloride 15 meq/


 Potassium


 Phosphate 10 mmol/


 Magnesium Sulfate


 10 meq/Calcium


 Gluconate 20 meq/


 Multivitamins 10


 ml/Chromium/


 Copper/Manganese/


 Seleni/Zn 0.5 ml/


 Total Parenteral


 Nutrition/Amino


 Acids/Dextrose/


 Fat Emulsion


 Intravenous  1,400 ml @ 


 58.333 mls/


 hr  TPN  CONT  3/23/20 22:00


 3/24/20 21:59 DC 3/23/20 21:42


58.333 MLS/HR


 


 Sodium Chloride


 90 meq/Potassium


 Chloride 15 meq/


 Potassium


 Phosphate 15 mmol/


 Magnesium Sulfate


 10 meq/Calcium


 Gluconate 15 meq/


 Multivitamins 10


 ml/Chromium/


 Copper/Manganese/


 Seleni/Zn 0.5 ml/


 Total Parenteral


 Nutrition/Amino


 Acids/Dextrose/


 Fat Emulsion


 Intravenous  1,400 ml @ 


 58.333 mls/


 hr  TPN  CONT  3/24/20 22:00


 3/25/20 21:59 DC 3/24/20 22:17


58.333 MLS/HR


 


 Sodium Chloride


 90 meq/Potassium


 Chloride 15 meq/


 Potassium


 Phosphate 15 mmol/


 Magnesium Sulfate


 10 meq/Calcium


 Gluconate 20 meq/


 Multivitamins 10


 ml/Chromium/


 Copper/Manganese/


 Seleni/Zn 0.5 ml/


 Total Parenteral


 Nutrition/Amino


 Acids/Dextrose/


 Fat Emulsion


 Intravenous  1,200 ml @ 


 50 mls/hr  TPN  CONT  3/22/20 22:00


 3/22/20 14:17 DC  





 


 Sodium Chloride


 90 meq/Potassium


 Chloride 15 meq/


 Potassium


 Phosphate 18 mmol/


 Magnesium Sulfate


 8 meq/Calcium


 Gluconate 15 meq/


 Multivitamins 10


 ml/Chromium/


 Copper/Manganese/


 Seleni/Zn 0.5 ml/


 Total Parenteral


 Nutrition/Amino


 Acids/Dextrose/


 Fat Emulsion


 Intravenous  1,400 ml @ 


 58.333 mls/


 hr  TPN  CONT  3/26/20 22:00


 3/27/20 21:59  3/26/20 22:00


58.333 MLS/HR


 


 Succinylcholine


 Chloride


  (Anectine)  120 mg  1X  ONCE  3/23/20 08:30


 3/23/20 08:31 DC 3/23/20 08:34


120 MG








Lab





Laboratory Tests








Test


 3/26/20


10:30 3/26/20


12:01 3/26/20


16:00 3/26/20


23:20


 


Influenza Type A Antigen


 Negative


(NEGATIVE) 


 


 





 


Influenza Type B Antigen


 Negative


(NEGATIVE) 


 


 





 


Glucose (Fingerstick)


 


 264 mg/dL


(70-99) 


 255 mg/dL


(70-99)


 


Magnesium Level


 


 


 2.3 mg/dL


(1.8-2.4) 2.3 mg/dL


(1.8-2.4)


 


NT-Pro-B-Type Natriuretic


Peptide 


 


 106 pg/mL


(0-124) 





 


Sodium Level


 


 


 


 139 mmol/L


(136-145)


 


Potassium Level


 


 


 


 4.1 mmol/L


(3.5-5.1)


 


Chloride Level


 


 


 


 104 mmol/L


()


 


Carbon Dioxide Level


 


 


 


 25 mmol/L


(21-32)


 


Anion Gap    10 (6-14) 


 


Blood Urea Nitrogen


 


 


 


 48 mg/dL


(7-20)


 


Creatinine


 


 


 


 1.9 mg/dL


(0.6-1.0)


 


Estimated GFR


(Cockcroft-Gault) 


 


 


 28.1 





 


Glucose Level


 


 


 


 252 mg/dL


(70-99)


 


Calcium Level


 


 


 


 7.6 mg/dL


(8.5-10.1)


 


Phosphorus Level


 


 


 


 4.1 mg/dL


(2.6-4.7)


 


Test


 3/27/20


05:50 3/27/20


05:57 


 





 


White Blood Count


 34.3 x10^3/uL


(4.0-11.0) 


 


 





 


Red Blood Count


 2.51 x10^6/uL


(3.50-5.40) 


 


 





 


Hemoglobin


 7.8 g/dL


(12.0-15.5) 


 


 





 


Hematocrit


 24.4 %


(36.0-47.0) 


 


 





 


Mean Corpuscular Volume 97 fL ()    


 


Mean Corpuscular Hemoglobin 31 pg (25-35)    


 


Mean Corpuscular Hemoglobin


Concent 32 g/dL


(31-37) 


 


 





 


Red Cell Distribution Width


 15.9 %


(11.5-14.5) 


 


 





 


Platelet Count


 281 x10^3/uL


(140-400) 


 


 





 


Neutrophils (%) (Auto) 91 % (31-73)    


 


Lymphocytes (%) (Auto) 4 % (24-48)    


 


Monocytes (%) (Auto) 3 % (0-9)    


 


Eosinophils (%) (Auto) 2 % (0-3)    


 


Basophils (%) (Auto) 0 % (0-3)    


 


Neutrophils # (Auto)


 31.3 x10^3/uL


(1.8-7.7) 


 


 





 


Lymphocytes # (Auto)


 1.3 x10^3/uL


(1.0-4.8) 


 


 





 


Monocytes # (Auto)


 1.1 x10^3/uL


(0.0-1.1) 


 


 





 


Eosinophils # (Auto)


 0.6 x10^3/uL


(0.0-0.7) 


 


 





 


Basophils # (Auto)


 0.1 x10^3/uL


(0.0-0.2) 


 


 





 


Sodium Level


 139 mmol/L


(136-145) 


 


 





 


Potassium Level


 4.4 mmol/L


(3.5-5.1) 


 


 





 


Chloride Level


 106 mmol/L


() 


 


 





 


Carbon Dioxide Level


 25 mmol/L


(21-32) 


 


 





 


Anion Gap 8 (6-14)    


 


Blood Urea Nitrogen


 44 mg/dL


(7-20) 


 


 





 


Creatinine


 1.6 mg/dL


(0.6-1.0) 


 


 





 


Estimated GFR


(Cockcroft-Gault) 34.3 


 


 


 





 


BUN/Creatinine Ratio 28 (6-20)    


 


Glucose Level


 216 mg/dL


(70-99) 


 


 





 


Calcium Level


 7.7 mg/dL


(8.5-10.1) 


 


 





 


Phosphorus Level


 4.3 mg/dL


(2.6-4.7) 


 


 





 


Magnesium Level


 2.1 mg/dL


(1.8-2.4) 


 


 





 


Total Bilirubin


 0.5 mg/dL


(0.2-1.0) 


 


 





 


Aspartate Amino Transf


(AST/SGOT) 34 U/L (15-37) 


 


 


 





 


Alanine Aminotransferase


(ALT/SGPT) 18 U/L (14-59) 


 


 


 





 


Alkaline Phosphatase


 108 U/L


() 


 


 





 


Total Protein


 4.5 g/dL


(6.4-8.2) 


 


 





 


Albumin


 1.7 g/dL


(3.4-5.0) 


 


 





 


Albumin/Globulin Ratio 0.6 (1.0-1.7)    


 


Triglycerides Level


 240 mg/dL


(0-150) 


 


 





 


Glucose (Fingerstick)


 


 225 mg/dL


(70-99) 


 











Results


All relevant outside records, renal labs, imaging studies, telemetry/EKG's were 

reviewed.











KIMBERLY MYERS MD                Mar 27, 2020 10:01

## 2020-03-27 NOTE — PDOC
Infectious Disease Note


Subjective


Subjective


Fever Tmax 100.6


Intubated FiO2 40% PEEP 8


On Levophed gtt this am - dosed increased with CRRT


TPN





ROS


ROS


unable to obtain





Vital Sign


Vital Signs





Vital Signs








  Date Time  Temp Pulse Resp B/P (MAP) Pulse Ox O2 Delivery O2 Flow Rate FiO2


 


3/27/20 06:00  111 18 100/65 (77) 98 Ventilator  


 


3/27/20 04:00 98.8       





 98.8       


 


3/27/20 02:06       6.0 











Physical Exam


PHYSICAL EXAM


GENERAL:Intubated/sedated - generalizd anasarca has increased


HEENT: Mild icterus.  Oral mucosa very dry.


NECK:  Supple. 


LUNGS:  Decreased breath sounds at the bases.


HEART:  S1, S2, tachycardia,  regular 


ABDOMEN:  Distended more, + BS. Rectal tube in place - soft


: [place]   


EXTREMITIES: Trace edema, no cyanosis - mild increased mottled but and toes 

remain non ischemic in appearance but right colder than left - Rooke boots in 

place


DERMATOLOGIC:  Warm and dry.  No generalized rash.  


CENTRAL NERVOUS SYSTEM: Intubated - rhythmic movements of head continue do not 

seem to coordinate with Vent


IV:  RIJ Temp HDC & RUE PICC - Left IJ





Labs


Lab





Laboratory Tests








Test


 3/26/20


07:45 3/26/20


10:30 3/26/20


12:01 3/26/20


16:00


 


O2 Saturation 96 % (92-99)    


 


Arterial Blood pH


 7.43


(7.35-7.45) 


 


 





 


Arterial Blood pCO2 at


Patient Temp 32 mmHg


(35-46) 


 


 





 


Arterial Blood pO2 at Patient


Temp 94 mmHg


() 


 


 





 


Arterial Blood HCO3


 21 mmol/L


(21-28) 


 


 





 


Arterial Blood Base Excess


 -3 mmol/L


(-3-3) 


 


 





 


FiO2 40%    


 


Influenza Type A Antigen


 


 Negative


(NEGATIVE) 


 





 


Influenza Type B Antigen


 


 Negative


(NEGATIVE) 


 





 


Glucose (Fingerstick)


 


 


 264 mg/dL


(70-99) 





 


Magnesium Level


 


 


 


 2.3 mg/dL


(1.8-2.4)


 


NT-Pro-B-Type Natriuretic


Peptide 


 


 


 106 pg/mL


(0-124)


 


Test


 3/26/20


23:20 3/27/20


05:57 


 





 


Sodium Level


 139 mmol/L


(136-145) 


 


 





 


Potassium Level


 4.1 mmol/L


(3.5-5.1) 


 


 





 


Chloride Level


 104 mmol/L


() 


 


 





 


Carbon Dioxide Level


 25 mmol/L


(21-32) 


 


 





 


Anion Gap 10 (6-14)    


 


Blood Urea Nitrogen


 48 mg/dL


(7-20) 


 


 





 


Creatinine


 1.9 mg/dL


(0.6-1.0) 


 


 





 


Estimated GFR


(Cockcroft-Gault) 28.1 


 


 


 





 


Glucose Level


 252 mg/dL


(70-99) 


 


 





 


Glucose (Fingerstick)


 255 mg/dL


(70-99) 225 mg/dL


(70-99) 


 





 


Calcium Level


 7.6 mg/dL


(8.5-10.1) 


 


 





 


Phosphorus Level


 4.1 mg/dL


(2.6-4.7) 


 


 





 


Magnesium Level


 2.3 mg/dL


(1.8-2.4) 


 


 











Micro


ABD U/S 3/26


IMPRESSION:


 


Poorly visualized pancreas but the portion visualized appears enlarged and


hypoechoic consistent with history of pancreatitis. Cholelithiasis. 


Ascites. Mild hepatomegaly with diffuse fatty infiltration.








STAT CXR and KUB reviewed on 3/23








Microbiology


3/18/20 Blood Culture - Final, Complete


          NO GROWTH AFTER 5 DAYS





Objective


Assessment





Anasarca - worse


Persistent rhythmic movements of head


Fever - better - Flu neg antigen 3/26 ? reactive with pancreatitis vs ID - C-

diff neg. S/p HD cath change with malfunction 3/25 - cults 3/24 neg


Hypotension better- levophed started  am 3/23 down to about 3 to 4 mics


Leukocytosis - increased - ? reactive - trouble with CRRT/S/p PRBCs ? intra-

abdominal 


Anemia - S/p PRBC 3/26


? developing peripheral ischemia - better


Acute pancreatitis, early developing necrosis -U/S 3/26 reviewed


Cholelithiasis


JED,Hyperkalemia, Metabolic acidosis on CRRT


Acute hypoxic resp failure, intubated


Hypocalcemia 


Prediabetes


HTN


3/25 Impression: Replacement of a right internal jugular temporary dialysis


catheter over a guidewire





Plan


Plan of Care





Appreciate consult by Neurology


F/u Blood cults 3/24 so far neg


CT ordered abd/pel but on CRRT will need when stable


Added Flagyl and Micafungin 3/23


D/c Meropenem with fever and Leukocytosis - Began Cefepime and Dapto 3/25


Discont Zyvox (3/20) neg cults try to cut down on potential interactions today 

3/27


f/u labs and cults


No surgical plans at this time





Electrolytes per primary





Critically ill





D/w Dr. Jorge





D/w nursing











RASHAWN ROSEN MD              Mar 27, 2020 07:26

## 2020-03-27 NOTE — NUR
SS following up with discharge planning. SS discussed with pt RN. Pt remains on the vent, 
CRRT, has fever, and on Levophed. SS will continue to follow for discharge planning.

## 2020-03-27 NOTE — PDOC
[facility] PROGRESS NOTE


Chief Complaint


Chief Complaint


Respiratory failure requiring mechanical ventilation


Severe Acute pancreatitis


Acute kidney failure now requiring dialysis


Salpingo--itis


Gallstones (Calculus of gallbladder with acute cholecystitis without 

obstruction)


HTN 


Leukocytosis 


Hypoxia


Uterine fibroid


Hypoxia with respiratory failure


Intractable pain


Intractable nausea


Possible Covid 19?





History of Present Illness


History of Present Illness


[id number]


Patient seen and examined in the ICU


She is now been transferred to the Covid 19 unit so we can rule out Covid and 

keep her in isolation


Very critically ill


Intubated and  ventilated


Assist control 40%


Chart reviewed


Discussed with RN


Still on CRRT


Getting a chest x-ray now


Very concerned about her prognosis








[id number]


Patient seen and examined in the ICU


She is extremely critically ill


Remains mechanically ventilated with assist control/25/450/40%


Also on continuous renal replacement therapy


Chart reviewed


Discussed with RN


She has TPN hanging


Also on pressors











[id number]


Patient seen and examined in the ICU


She is still requiring CRRT


On the vent with assist control but her FiO2 is down to 40% from yesterday


Slightly better but still very critically ill


Discussed with RN


Chart reviewed








[id number]


Patient seen and examined in the ICU


She remains extremely critically ill


On IV fentanyl IV Versed IV Doxy


On the vent


Assist-control/25/450/70%


She is critically ill


Discussed with RN


Chart reviewed


Patient is currently on CRRT as well








[id number]


Patient seen and examined in the ICU


She had to be intubated this morning


On assist-control 25/450/100% with 10 of PEEP and only satting 87%


She is extremely critically ill


I'm not sure if she will survive


Chart reviewed


Discussed with RN











Ms Tadeo is a 48yo F w/ PMHx HTN, prediabetes who presents the emergency room

complaints of abdominal pain. Patient described off and on 3 days. She states is

constant, described as a squeezing sensation in a band-like distribution. + 

nausea, vomiting.  She denies any fever or diarrhea.  Patient denies any 

abdominal surgical procedures.  She states is worse with movements, car ride.  

Pain initially was upper abdomen however now pretty much generalized.  Last 

bowel movement was 3/15/2020. Nothing makes her pain better.  Patient denies any

shortness of breath.  She does state the pain moves into her chest.  Denies any 

headache or visual changes.


Lipase 95240, , , Bilirubin 1.4.


CT abdomen confirms pancreatic inflammation, peripancreatic fluid and 

inflammatory changes around the pancreas consistent with pancreatitis. 

Cholelithiasis and 1.4cm uterine fibroid as well as possible left salpingitis. 

Admitted for further care


GI, General surgery, ID, Pulm consulted.





3/17: Overnight per report no urine output. Added dilaudid for pain, PICC placed

per IR. Renal US negative.Seen bedside in ICU, given 2L additional NSS and 

albumin infusion. Still hypotensive, started on levophed. Repeat CT abdomen with

necrosis. Updated her fiancee


3/18: Sats are only 87% on nasal cannula oxygen. Dialysis catheter per 

nephrology


3/19: She is now on BiPAP appears more ill, now on dialysis


3/20: Seen on BiPAP. Her mother and another family member are present and seemed

to be good support for her. Currently on dialysis. Appears critically ill


3/21: Overnight Tmax 101.7 , still on BiPAP FiO2 40%, still on low dose Levophed

gtt, TPN initiated. On dialysis





Overnight still febrile. Dialysis today. She wakes up and responds to pain. No 

CP.





Vitals/I&O


Vitals/I&O:





                                   Vital Signs








  Date Time  Temp Pulse Resp B/P (MAP) Pulse Ox O2 Delivery O2 Flow Rate FiO2


 


3/27/20 11:00  115 25 105/62 (76) 100 Ventilator  


 


3/27/20 07:00 98.4       





 98.4       


 


3/27/20 02:06       6.0 














                                    I & O   


 


 3/26/20 3/26/20 3/27/20





 15:00 23:00 07:00


 


Intake Total 500 ml 1098.47 ml 1143 ml


 


Output Total 210 ml 155 ml 250 ml


 


Balance 290 ml 943.47 ml 893 ml











Physical Exam


Physical Exam:


GENERAL:Intubated/sedated - generalizd anasarca has increased


HEENT: Mild icterus.  Oral mucosa very dry.


NECK:  Supple. 


LUNGS:  Decreased breath sounds at the bases.


HEART:  S1, S2, tachycardia,  regular 


ABDOMEN:  Distended more, + BS. Rectal tube in place - soft


: [place]   


EXTREMITIES: Trace edema, no cyanosis - mild increased mottled but and toes 

remain non ischemic in appearance but right colder than left - Ro[place] boots in 

place


DERMATOLOGIC:  Warm and dry.  No generalized rash.  


CENTRAL NERVOUS SYSTEM: Intubated - rhythmic movements of head continue do not 

seem to coordinate with Vent


IV:  RIJ Temp HDC & RUE PICC - Left IJ


General:  Other (sedated )


Heart:  Other (increased rate)


Lungs:  Crackles


Abdomen:  Soft


Extremities:  No edema, Other (SOME CLUBBING )


Skin:  Other (mottling noted to extremities )





Labs


Labs:





Laboratory Tests








Test


 3/26/20


12:01 3/26/20


16:00 3/26/20


23:20 3/27/20


05:50


 


Glucose (Fingerstick)


 264 mg/dL


(70-99) 


 255 mg/dL


(70-99) 





 


Magnesium Level


 


 2.3 mg/dL


(1.8-2.4) 2.3 mg/dL


(1.8-2.4) 2.1 mg/dL


(1.8-2.4)


 


NT-Pro-B-Type Natriuretic


Peptide 


 106 pg/mL


(0-124) 


 





 


Sodium Level


 


 


 139 mmol/L


(136-145) 139 mmol/L


(136-145)


 


Potassium Level


 


 


 4.1 mmol/L


(3.5-5.1) 4.4 mmol/L


(3.5-5.1)


 


Chloride Level


 


 


 104 mmol/L


() 106 mmol/L


()


 


Carbon Dioxide Level


 


 


 25 mmol/L


(21-32) 25 mmol/L


(21-32)


 


Anion Gap   10 (6-14)  8 (6-14) 


 


Blood Urea Nitrogen


 


 


 48 mg/dL


(7-20) 44 mg/dL


(7-20)


 


Creatinine


 


 


 1.9 mg/dL


(0.6-1.0) 1.6 mg/dL


(0.6-1.0)


 


Estimated GFR


(Cockcroft-Gault) 


 


 28.1 


 34.3 





 


Glucose Level


 


 


 252 mg/dL


(70-99) 216 mg/dL


(70-99)


 


Calcium Level


 


 


 7.6 mg/dL


(8.5-10.1) 7.7 mg/dL


(8.5-10.1)


 


Phosphorus Level


 


 


 4.1 mg/dL


(2.6-4.7) 4.3 mg/dL


(2.6-4.7)


 


White Blood Count


 


 


 


 34.3 x10^3/uL


(4.0-11.0)


 


Red Blood Count


 


 


 


 2.51 x10^6/uL


(3.50-5.40)


 


Hemoglobin


 


 


 


 7.8 g/dL


(12.0-15.5)


 


Hematocrit


 


 


 


 24.4 %


(36.0-47.0)


 


Mean Corpuscular Volume    97 fL () 


 


Mean Corpuscular Hemoglobin    31 pg (25-35) 


 


Mean Corpuscular Hemoglobin


Concent 


 


 


 32 g/dL


(31-37)


 


Red Cell Distribution Width


 


 


 


 15.9 %


(11.5-14.5)


 


Platelet Count


 


 


 


 281 x10^3/uL


(140-400)


 


Neutrophils (%) (Auto)    91 % (31-73) 


 


Lymphocytes (%) (Auto)    4 % (24-48) 


 


Monocytes (%) (Auto)    3 % (0-9) 


 


Eosinophils (%) (Auto)    2 % (0-3) 


 


Basophils (%) (Auto)    0 % (0-3) 


 


Neutrophils # (Auto)


 


 


 


 31.3 x10^3/uL


(1.8-7.7)


 


Lymphocytes # (Auto)


 


 


 


 1.3 x10^3/uL


(1.0-4.8)


 


Monocytes # (Auto)


 


 


 


 1.1 x10^3/uL


(0.0-1.1)


 


Eosinophils # (Auto)


 


 


 


 0.6 x10^3/uL


(0.0-0.7)


 


Basophils # (Auto)


 


 


 


 0.1 x10^3/uL


(0.0-0.2)


 


BUN/Creatinine Ratio    28 (6-20) 


 


Total Bilirubin


 


 


 


 0.5 mg/dL


(0.2-1.0)


 


Aspartate Amino Transf


(AST/SGOT) 


 


 


 34 U/L (15-37) 





 


Alanine Aminotransferase


(ALT/SGPT) 


 


 


 18 U/L (14-59) 





 


Alkaline Phosphatase


 


 


 


 108 U/L


()


 


Total Protein


 


 


 


 4.5 g/dL


(6.4-8.2)


 


Albumin


 


 


 


 1.7 g/dL


(3.4-5.0)


 


Albumin/Globulin Ratio    0.6 (1.0-1.7) 


 


Triglycerides Level


 


 


 


 240 mg/dL


(0-150)


 


Test


 3/27/20


05:57 3/27/20


09:15 


 





 


Glucose (Fingerstick)


 225 mg/dL


(70-99) 


 


 





 


Sodium Level


 


 138 mmol/L


(136-145) 


 





 


Potassium Level


 


 4.3 mmol/L


(3.5-5.1) 


 





 


Chloride Level


 


 105 mmol/L


() 


 





 


Carbon Dioxide Level


 


 24 mmol/L


(21-32) 


 





 


Anion Gap  9 (6-14)   


 


Blood Urea Nitrogen


 


 44 mg/dL


(7-20) 


 





 


Creatinine


 


 1.7 mg/dL


(0.6-1.0) 


 





 


Estimated GFR


(Cockcroft-Gault) 


 31.9 


 


 





 


Glucose Level


 


 250 mg/dL


(70-99) 


 





 


Calcium Level


 


 7.8 mg/dL


(8.5-10.1) 


 





 


Magnesium Level


 


 2.2 mg/dL


(1.8-2.4) 


 














Review of Systems


Review of Systems:


Unable to obtain





Assessment and Plan


Assessmemt and Plan


Problems


Medical Problems:


(1) Acute pancreatitis


Status: Acute  





(2) Cholelithiasis


Status: Acute  





Respiratory failure requiring intubation this morning 


Severe Acute pancreatitis


Acute kidney failure now requiring dialysis


Salpingo--itis


Gallstones (Calculus of gallbladder with acute cholecystitis without 

obstruction)


HTN 


Leukocytosis 


Hypoxia


Uterine fibroid


Hypoxia with respiratory failure


Intractable pain


Intractable nausea











Plan


Checking Covid 19 await results


CRRT


Mechanical ventilation


Hemodialysis


BiPAP


ICU monitoring


Trend labs especially white count and lipase levels


We have consulted GI and general surgery


Appreciate pulmonary assistance as well


IV antibiotics


IV fluids


IL narcotics


When necessary anti-medics


Home meds if possible


DVT prophylaxis


Full code


She is critically ill


Total time 33 minutes





Comment


Review of Relevant


I have reviewed the following items josy (where applicable) has been applied.


Medications:





Current Medications








 Medications


  (Trade)  Dose


 Ordered  Sig/Yee


 Route


 PRN Reason  Start Time


 Stop Time Status Last Admin


Dose Admin


 


 Sodium Chloride


 90 meq/Potassium


 Chloride 15 meq/


 Potassium


 Phosphate 18 mmol/


 Magnesium Sulfate


 8 meq/Calcium


 Gluconate 15 meq/


 Multivitamins 10


 ml/Chromium/


 Copper/Manganese/


 Seleni/Zn 0.5 ml/


 Total Parenteral


 Nutrition/Amino


 Acids/Dextrose/


 Fat Emulsion


 Intravenous  1,400 ml @ 


 58.333 mls/


 hr  TPN  CONT


 IV


   3/26/20 22:00


 3/27/20 21:59  3/26/20 22:00














Hemodynamically unstable?:  Yes


Is patient in severe pain?:  Yes


Is NPO status required?:  Yes











HECTOR MASON III DO           Mar 27, 2020 11:57

## 2020-03-27 NOTE — NUR
Pharmacy TPN Dosing Note



S: JESENIA RICH is a 49 year old F Currently receiving Central Continuous TPN started 
03/18/20



B:Pertinent PMH: Necrotizing pancreatitis



Height: 5 feet, 8 inches

Weight: 112 kg



Current diet: NPO 



LABS:

Sodium:    138 

Potassium: 4.3 

Chloride:  105 

Calcium:   7.8 

Corrected Calcium: 9.48 

Magnesium: 2.2 

CO2:       24 

SCr:       1.7 

Glucose:   250 

Albumin:   1.9 

AST:       37 

ALT:       16 



TPN FORMULA:

TPN TYPE:  Central Continuous

AMINO ACIDS:         125 gm

DEXTROSE:            195 gm

LIPIDS:              40 gm

SODIUM CHLORIDE:     90 mEq

POTASSIUM CHLORIDE:  15 mEq

POTASSIUM PHOSPHATE: 18 mmol

MAGNESIUM:           8 mEq

CALCIUM:             15 mEq

INSULIN:             10 units

MULTIPLE VITAMIN:    10 ml

TRACE ELEMENTS:      0.5 ml



TPN PLAN:  

-Electrolytes remain WNL and appear stable for now. Still on CRRT.

-Blood glucose values all above goal range, add regular insulin 10 units to TPN.

-Labs per nephrology for CRRT monitoring.





R: Continue TPN @ current rate and with above formula. 

Will monitor electrolytes, glucose, and tolerance to TPN.



 VALERIE SNELL, [facility], 03/27/20 [id number]

## 2020-03-27 NOTE — PDOC
PROGRESS NOTES


Assessment


Problems


Medical Problems:


(1) Acute pancreatitis


Status: Acute  





(2) Cholelithiasis


Status: Acute  





Metabolic encephalopathy, she may have had a seizure 3/26.  The question is why 

did she suddenly become unresponsive.  Was this just respiratory failure?  Did 

she have some sort of intracranial catastrophe?


Medical issues:  fever, gallstone pancreatitis, hypotension related to sepsis, 

on levophed, leukocytosis, anemia, possible peripheral ischemia, acute kidney 

injury, hyperkalemia, metabolic acidosis on CRRT, acute hypoxic resp failure, 

intubated, hypocalcemia, prediabetes, hypertension history, salpingoitis, 

uterine fibroid, pain nausea.


Plan


Unable to transfer for CT scan


Await EEG 


She should be adequately covered for seizures with her current sedating 

medications.


Check coronavirus titers.


I left a message with family yesterday, unreturned


Subjective


none


Objective





Vital Signs








  Date Time  Temp Pulse Resp B/P (MAP) Pulse Ox O2 Delivery O2 Flow Rate FiO2


 


3/27/20 06:00  111 18 100/65 (77) 98 Ventilator  


 


3/27/20 04:00 98.8       





 98.8       


 


3/27/20 02:06       6.0 














Intake and Output 


 


 3/27/20





 07:00


 


Intake Total 2741.47 ml


 


Output Total 570 ml


 


Balance 2171.47 ml


 


 


 


IV Total 2391.47 ml


 


Blood Product 300 ml


 


Blood Product IV Normal Saline Flush 50 ml


 


Output Urine Total 570 ml








PHYSICAL EXAM


Sedated on ventilator, unresponsive, nurse reports occasional twitching, I did 

not see any


PERRL.


No spontaneous extraocular movements


CN: no focal findings.


Muscle tone: normal.


Muscle strength:  no movement


DTR:  0+


Plantar reflex:  silent


Gait: not examined 


Sensory exam:  not cooperative.


Cerebellar: not cooperative


Review of Relevant


I have reviewed the following items josy (where applicable) has been applied.


Labs





Laboratory Tests








Test


 3/25/20


15:00 3/25/20


17:38 3/25/20


21:00 3/26/20


00:20


 


Sodium Level


 141 mmol/L


(136-145) 


 138 mmol/L


(136-145) 





 


Potassium Level


 3.8 mmol/L


(3.5-5.1) 


 3.7 mmol/L


(3.5-5.1) 





 


Chloride Level


 104 mmol/L


() 


 104 mmol/L


() 





 


Carbon Dioxide Level


 25 mmol/L


(21-32) 


 27 mmol/L


(21-32) 





 


Anion Gap 12 (6-14)   7 (6-14)  


 


Blood Urea Nitrogen


 49 mg/dL


(7-20) 


 44 mg/dL


(7-20) 





 


Creatinine


 2.5 mg/dL


(0.6-1.0) 


 2.0 mg/dL


(0.6-1.0) 





 


Estimated GFR


(Cockcroft-Gault) 20.5 


 


 26.5 


 





 


Glucose Level


 290 mg/dL


(70-99) 


 195 mg/dL


(70-99) 





 


Calcium Level


 7.4 mg/dL


(8.5-10.1) 


 7.5 mg/dL


(8.5-10.1) 





 


Phosphorus Level


 3.9 mg/dL


(2.6-4.7) 


 3.6 mg/dL


(2.6-4.7) 





 


Magnesium Level


 2.2 mg/dL


(1.8-2.4) 


 2.3 mg/dL


(1.8-2.4) 





 


Glucose (Fingerstick)


 


 253 mg/dL


(70-99) 


 210 mg/dL


(70-99)


 


Test


 3/26/20


05:20 3/26/20


05:32 3/26/20


07:45 3/26/20


10:30


 


White Blood Count


 23.8 x10^3/uL


(4.0-11.0) 


 


 





 


Red Blood Count


 1.97 x10^6/uL


(3.50-5.40) 


 


 





 


Hemoglobin


 6.5 g/dL


(12.0-15.5) 


 


 





 


Hematocrit


 19.3 %


(36.0-47.0) 


 


 





 


Mean Corpuscular Volume 98 fL ()    


 


Mean Corpuscular Hemoglobin 33 pg (25-35)    


 


Mean Corpuscular Hemoglobin


Concent 34 g/dL


(31-37) 


 


 





 


Red Cell Distribution Width


 13.8 %


(11.5-14.5) 


 


 





 


Platelet Count


 274 x10^3/uL


(140-400) 


 


 





 


Neutrophils (%) (Auto) 89 % (31-73)    


 


Lymphocytes (%) (Auto) 5 % (24-48)    


 


Monocytes (%) (Auto) 4 % (0-9)    


 


Eosinophils (%) (Auto) 1 % (0-3)    


 


Basophils (%) (Auto) 0 % (0-3)    


 


Neutrophils # (Auto)


 21.2 x10^3/uL


(1.8-7.7) 


 


 





 


Lymphocytes # (Auto)


 1.2 x10^3/uL


(1.0-4.8) 


 


 





 


Monocytes # (Auto)


 1.0 x10^3/uL


(0.0-1.1) 


 


 





 


Eosinophils # (Auto)


 0.3 x10^3/uL


(0.0-0.7) 


 


 





 


Basophils # (Auto)


 0.1 x10^3/uL


(0.0-0.2) 


 


 





 


Sodium Level


 137 mmol/L


(136-145) 


 


 





 


Potassium Level


 3.8 mmol/L


(3.5-5.1) 


 


 





 


Chloride Level


 103 mmol/L


() 


 


 





 


Carbon Dioxide Level


 22 mmol/L


(21-32) 


 


 





 


Anion Gap 12 (6-14)    


 


Blood Urea Nitrogen


 59 mg/dL


(7-20) 


 


 





 


Creatinine


 2.5 mg/dL


(0.6-1.0) 


 


 





 


Estimated GFR


(Cockcroft-Gault) 20.5 


 


 


 





 


BUN/Creatinine Ratio 24 (6-20)    


 


Glucose Level


 279 mg/dL


(70-99) 


 


 





 


Calcium Level


 7.7 mg/dL


(8.5-10.1) 


 


 





 


Total Bilirubin


 0.6 mg/dL


(0.2-1.0) 


 


 





 


Aspartate Amino Transf


(AST/SGOT) 37 U/L (15-37) 


 


 


 





 


Alanine Aminotransferase


(ALT/SGPT) 16 U/L (14-59) 


 


 


 





 


Alkaline Phosphatase


 83 U/L


() 


 


 





 


Total Protein


 4.3 g/dL


(6.4-8.2) 


 


 





 


Albumin


 1.9 g/dL


(3.4-5.0) 


 


 





 


Albumin/Globulin Ratio 0.8 (1.0-1.7)    


 


Glucose (Fingerstick)


 


 269 mg/dL


(70-99) 


 





 


O2 Saturation   96 % (92-99)  


 


Arterial Blood pH


 


 


 7.43


(7.35-7.45) 





 


Arterial Blood pCO2 at


Patient Temp 


 


 32 mmHg


(35-46) 





 


Arterial Blood pO2 at Patient


Temp 


 


 94 mmHg


() 





 


Arterial Blood HCO3


 


 


 21 mmol/L


(21-28) 





 


Arterial Blood Base Excess


 


 


 -3 mmol/L


(-3-3) 





 


FiO2   40%  


 


Influenza Type A Antigen


 


 


 


 Negative


(NEGATIVE)


 


Influenza Type B Antigen


 


 


 


 Negative


(NEGATIVE)


 


Test


 3/26/20


12:01 3/26/20


16:00 3/26/20


23:20 3/27/20


05:50


 


Glucose (Fingerstick)


 264 mg/dL


(70-99) 


 255 mg/dL


(70-99) 





 


Magnesium Level


 


 2.3 mg/dL


(1.8-2.4) 2.3 mg/dL


(1.8-2.4) 2.1 mg/dL


(1.8-2.4)


 


NT-Pro-B-Type Natriuretic


Peptide 


 106 pg/mL


(0-124) 


 





 


Sodium Level


 


 


 139 mmol/L


(136-145) 139 mmol/L


(136-145)


 


Potassium Level


 


 


 4.1 mmol/L


(3.5-5.1) 4.4 mmol/L


(3.5-5.1)


 


Chloride Level


 


 


 104 mmol/L


() 106 mmol/L


()


 


Carbon Dioxide Level


 


 


 25 mmol/L


(21-32) 25 mmol/L


(21-32)


 


Anion Gap   10 (6-14)  8 (6-14) 


 


Blood Urea Nitrogen


 


 


 48 mg/dL


(7-20) 44 mg/dL


(7-20)


 


Creatinine


 


 


 1.9 mg/dL


(0.6-1.0) 1.6 mg/dL


(0.6-1.0)


 


Estimated GFR


(Cockcroft-Gault) 


 


 28.1 


 34.3 





 


Glucose Level


 


 


 252 mg/dL


(70-99) 216 mg/dL


(70-99)


 


Calcium Level


 


 


 7.6 mg/dL


(8.5-10.1) 7.7 mg/dL


(8.5-10.1)


 


Phosphorus Level


 


 


 4.1 mg/dL


(2.6-4.7) 4.3 mg/dL


(2.6-4.7)


 


White Blood Count


 


 


 


 34.3 x10^3/uL


(4.0-11.0)


 


Red Blood Count


 


 


 


 2.51 x10^6/uL


(3.50-5.40)


 


Hemoglobin


 


 


 


 7.8 g/dL


(12.0-15.5)


 


Hematocrit


 


 


 


 24.4 %


(36.0-47.0)


 


Mean Corpuscular Volume    97 fL () 


 


Mean Corpuscular Hemoglobin    31 pg (25-35) 


 


Mean Corpuscular Hemoglobin


Concent 


 


 


 32 g/dL


(31-37)


 


Red Cell Distribution Width


 


 


 


 15.9 %


(11.5-14.5)


 


Platelet Count


 


 


 


 281 x10^3/uL


(140-400)


 


Neutrophils (%) (Auto)    91 % (31-73) 


 


Lymphocytes (%) (Auto)    4 % (24-48) 


 


Monocytes (%) (Auto)    3 % (0-9) 


 


Eosinophils (%) (Auto)    2 % (0-3) 


 


Basophils (%) (Auto)    0 % (0-3) 


 


Neutrophils # (Auto)


 


 


 


 31.3 x10^3/uL


(1.8-7.7)


 


Lymphocytes # (Auto)


 


 


 


 1.3 x10^3/uL


(1.0-4.8)


 


Monocytes # (Auto)


 


 


 


 1.1 x10^3/uL


(0.0-1.1)


 


Eosinophils # (Auto)


 


 


 


 0.6 x10^3/uL


(0.0-0.7)


 


Basophils # (Auto)


 


 


 


 0.1 x10^3/uL


(0.0-0.2)


 


BUN/Creatinine Ratio    28 (6-20) 


 


Total Bilirubin


 


 


 


 0.5 mg/dL


(0.2-1.0)


 


Aspartate Amino Transf


(AST/SGOT) 


 


 


 34 U/L (15-37) 





 


Alanine Aminotransferase


(ALT/SGPT) 


 


 


 18 U/L (14-59) 





 


Alkaline Phosphatase


 


 


 


 108 U/L


()


 


Total Protein


 


 


 


 4.5 g/dL


(6.4-8.2)


 


Albumin


 


 


 


 1.7 g/dL


(3.4-5.0)


 


Albumin/Globulin Ratio    0.6 (1.0-1.7) 


 


Triglycerides Level


 


 


 


 240 mg/dL


(0-150)


 


Test


 3/27/20


05:57 


 


 





 


Glucose (Fingerstick)


 225 mg/dL


(70-99) 


 


 











Laboratory Tests








Test


 3/26/20


10:30 3/26/20


12:01 3/26/20


16:00 3/26/20


23:20


 


Influenza Type A Antigen


 Negative


(NEGATIVE) 


 


 





 


Influenza Type B Antigen


 Negative


(NEGATIVE) 


 


 





 


Glucose (Fingerstick)


 


 264 mg/dL


(70-99) 


 255 mg/dL


(70-99)


 


Magnesium Level


 


 


 2.3 mg/dL


(1.8-2.4) 2.3 mg/dL


(1.8-2.4)


 


NT-Pro-B-Type Natriuretic


Peptide 


 


 106 pg/mL


(0-124) 





 


Sodium Level


 


 


 


 139 mmol/L


(136-145)


 


Potassium Level


 


 


 


 4.1 mmol/L


(3.5-5.1)


 


Chloride Level


 


 


 


 104 mmol/L


()


 


Carbon Dioxide Level


 


 


 


 25 mmol/L


(21-32)


 


Anion Gap    10 (6-14) 


 


Blood Urea Nitrogen


 


 


 


 48 mg/dL


(7-20)


 


Creatinine


 


 


 


 1.9 mg/dL


(0.6-1.0)


 


Estimated GFR


(Cockcroft-Gault) 


 


 


 28.1 





 


Glucose Level


 


 


 


 252 mg/dL


(70-99)


 


Calcium Level


 


 


 


 7.6 mg/dL


(8.5-10.1)


 


Phosphorus Level


 


 


 


 4.1 mg/dL


(2.6-4.7)


 


Test


 3/27/20


05:50 3/27/20


05:57 


 





 


White Blood Count


 34.3 x10^3/uL


(4.0-11.0) 


 


 





 


Red Blood Count


 2.51 x10^6/uL


(3.50-5.40) 


 


 





 


Hemoglobin


 7.8 g/dL


(12.0-15.5) 


 


 





 


Hematocrit


 24.4 %


(36.0-47.0) 


 


 





 


Mean Corpuscular Volume 97 fL ()    


 


Mean Corpuscular Hemoglobin 31 pg (25-35)    


 


Mean Corpuscular Hemoglobin


Concent 32 g/dL


(31-37) 


 


 





 


Red Cell Distribution Width


 15.9 %


(11.5-14.5) 


 


 





 


Platelet Count


 281 x10^3/uL


(140-400) 


 


 





 


Neutrophils (%) (Auto) 91 % (31-73)    


 


Lymphocytes (%) (Auto) 4 % (24-48)    


 


Monocytes (%) (Auto) 3 % (0-9)    


 


Eosinophils (%) (Auto) 2 % (0-3)    


 


Basophils (%) (Auto) 0 % (0-3)    


 


Neutrophils # (Auto)


 31.3 x10^3/uL


(1.8-7.7) 


 


 





 


Lymphocytes # (Auto)


 1.3 x10^3/uL


(1.0-4.8) 


 


 





 


Monocytes # (Auto)


 1.1 x10^3/uL


(0.0-1.1) 


 


 





 


Eosinophils # (Auto)


 0.6 x10^3/uL


(0.0-0.7) 


 


 





 


Basophils # (Auto)


 0.1 x10^3/uL


(0.0-0.2) 


 


 





 


Sodium Level


 139 mmol/L


(136-145) 


 


 





 


Potassium Level


 4.4 mmol/L


(3.5-5.1) 


 


 





 


Chloride Level


 106 mmol/L


() 


 


 





 


Carbon Dioxide Level


 25 mmol/L


(21-32) 


 


 





 


Anion Gap 8 (6-14)    


 


Blood Urea Nitrogen


 44 mg/dL


(7-20) 


 


 





 


Creatinine


 1.6 mg/dL


(0.6-1.0) 


 


 





 


Estimated GFR


(Cockcroft-Gault) 34.3 


 


 


 





 


BUN/Creatinine Ratio 28 (6-20)    


 


Glucose Level


 216 mg/dL


(70-99) 


 


 





 


Calcium Level


 7.7 mg/dL


(8.5-10.1) 


 


 





 


Phosphorus Level


 4.3 mg/dL


(2.6-4.7) 


 


 





 


Magnesium Level


 2.1 mg/dL


(1.8-2.4) 


 


 





 


Total Bilirubin


 0.5 mg/dL


(0.2-1.0) 


 


 





 


Aspartate Amino Transf


(AST/SGOT) 34 U/L (15-37) 


 


 


 





 


Alanine Aminotransferase


(ALT/SGPT) 18 U/L (14-59) 


 


 


 





 


Alkaline Phosphatase


 108 U/L


() 


 


 





 


Total Protein


 4.5 g/dL


(6.4-8.2) 


 


 





 


Albumin


 1.7 g/dL


(3.4-5.0) 


 


 





 


Albumin/Globulin Ratio 0.6 (1.0-1.7)    


 


Triglycerides Level


 240 mg/dL


(0-150) 


 


 





 


Glucose (Fingerstick)


 


 225 mg/dL


(70-99) 


 











Microbiology


3/24/20 Blood Culture - Preliminary, Resulted


          NO GROWTH AFTER 2 DAYS


Medications





Current Medications


Sodium Chloride 1,000 ml @  1,000 mls/hr Q1H IV  Last administered on 3/16/20at 

03:00;  Start 3/16/20 at 03:00;  Stop 3/16/20 at 03:59;  Status DC


Ondansetron HCl (Zofran) 4 mg 1X  ONCE IVP  Last administered on 3/16/20at 

03:27;  Start 3/16/20 at 03:00;  Stop 3/16/20 at 03:01;  Status DC


Morphine Sulfate (Morphine Sulfate) 4 mg 1X  ONCE IV ;  Start 3/16/20 at 03:00; 

Stop 3/16/20 at 03:01;  Status Cancel


Ketorolac Tromethamine (Toradol 30mg Vial) 30 mg 1X  ONCE IV  Last administered 

on 3/16/20at 02:54;  Start 3/16/20 at 03:00;  Stop 3/16/20 at 03:01;  Status DC


Fentanyl Citrate (Fentanyl 2ml Vial) 25 mcg 1X  ONCE IVP  Last administered on 

3/16/20at 03:23;  Start 3/16/20 at 03:30;  Stop 3/16/20 at 03:31;  Status DC


Fentanyl Citrate (Fentanyl 2ml Vial) 100 mcg STK-MED ONCE .ROUTE ;  Start 3/1

6/20 at 03:18;  Stop 3/16/20 at 03:18;  Status DC


Iohexol (Omnipaque 350 Mg/ml) 90 ml 1X  ONCE IV  Last administered on 3/16/20at 

03:25;  Start 3/16/20 at 03:30;  Stop 3/16/20 at 03:31;  Status DC


Info (CONTRAST GIVEN -- Rx MONITORING) 1 each PRN DAILY  PRN MC SEE COMMENTS;  

Start 3/16/20 at 03:30;  Stop 3/18/20 at 03:29;  Status DC


Hydromorphone HCl (Dilaudid) 0.5 mg 1X  ONCE IV  Last administered on 3/16/20at 

03:55;  Start 3/16/20 at 04:30;  Stop 3/16/20 at 04:32;  Status DC


Ondansetron HCl (Zofran) 4 mg PRN Q8HRS  PRN IV NAUSEA/VOMITING 1ST CHOICE;  

Start 3/16/20 at 05:00;  Stop 3/16/20 at 09:27;  Status DC


Morphine Sulfate (Morphine Sulfate) 2 mg PRN Q2HR  PRN IV SEVERE PAIN 7-10 Last 

administered on 3/17/20at 12:26;  Start 3/16/20 at 05:00;  Stop 3/17/20 at 

14:15;  Status DC


Sodium Chloride 1,000 ml @  125 mls/hr Q8H IV  Last administered on 3/16/20at 

20:56;  Start 3/16/20 at 05:00;  Stop 3/17/20 at 04:59;  Status DC


Hydromorphone HCl (Dilaudid) 0.5 mg PRN Q3HRS  PRN IV SEVERE PAIN 7-10 Last 

administered on 3/17/20at 10:06;  Start 3/16/20 at 05:00;  Stop 3/17/20 at 

12:01;  Status DC


Piperacillin Sod/ Tazobactam Sod 4.5 gm/Sodium Chloride 100 ml @  200 mls/hr 1X 

ONCE IV  Last administered on 3/16/20at 05:44;  Start 3/16/20 at 06:00;  Stop 

3/16/20 at 06:29;  Status DC


Ondansetron HCl (Zofran) 4 mg PRN Q4HRS  PRN IV NAUSEA/VOMITING 1ST CHOICE Last 

administered on 3/21/20at 16:15;  Start 3/16/20 at 09:30


Insulin Human Lispro (HumaLOG) 0-9 UNITS Q6HRS SQ  Last administered on 

3/27/20at 06:01;  Start 3/16/20 at 09:30


Dextrose (Dextrose 50%-Water Syringe) 12.5 gm PRN Q15MIN  PRN IV SEE COMMENTS;  

Start 3/16/20 at 09:30


Pantoprazole Sodium (PROTONIX VIAL for IV PUSH) 40 mg DAILYAC IVP  Last 

administered on 3/27/20at 09:02;  Start 3/16/20 at 11:30


Prochlorperazine Edisylate (Compazine) 10 mg PRN Q6HRS  PRN IV NAUSEA/VOMITING, 

2nd CHOICE Last administered on 3/17/20at 00:42;  Start 3/16/20 at 17:45


Atenolol (Tenormin) 100 mg DAILY PO ;  Start 3/17/20 at 09:00;  Stop 3/16/20 at 

20:08;  Status DC


Metoprolol Tartrate (Lopressor Vial) 2.5 mg Q6HRS IVP  Last administered on 

3/17/20at 05:51;  Start 3/16/20 at 20:15;  Stop 3/17/20 at 10:02;  Status DC


Metoprolol Tartrate (Lopressor Vial) 5 mg Q6HRS IVP  Last administered on 

3/26/20at 00:12;  Start 3/17/20 at 10:15


Hydromorphone HCl (Dilaudid) 1 mg PRN Q3HRS  PRN IV SEVERE PAIN 7-10 Last 

administered on 3/23/20at 05:13;  Start 3/17/20 at 12:00


Lidocaine HCl (Buffered Lidocaine 1%) 3 ml STK-MED ONCE .ROUTE ;  Start 3/17/20 

at 12:55;  Stop 3/17/20 at 12:56;  Status DC


Albumin Human 500 ml @  125 mls/hr 1X  ONCE IV  Last administered on 3/17/20at 

14:33;  Start 3/17/20 at 14:30;  Stop 3/17/20 at 18:32;  Status DC


Norepinephrine Bitartrate 8 mg/ Dextrose 258 ml @  17.299 mls/ hr CONT  PRN IV 

PER PROTOCOL Last administered on 3/26/20at 01:42;  Start 3/17/20 at 15:30


Sodium Chloride 1,000 ml @  125 mls/hr Q8H IV  Last administered on 3/17/20at 

21:04;  Start 3/17/20 at 16:00;  Stop 3/18/20 at 02:42;  Status DC


Albumin Human 500 ml @  125 mls/hr PRN BID  PRN IV After every 2L NSS & BP < 

90mm Last administered on 3/26/20at 14:20;  Start 3/17/20 at 16:00


Iohexol (Omnipaque 300 Mg/ml) 60 ml 1X  ONCE IV  Last administered on 3/17/20at 

17:20;  Start 3/17/20 at 17:00;  Stop 3/17/20 at 17:01;  Status DC


Info (CONTRAST GIVEN -- Rx MONITORING) 1 each PRN DAILY  PRN MC SEE COMMENTS;  

Start 3/17/20 at 17:00;  Stop 3/19/20 at 16:59;  Status DC


Meropenem 1 gm/ Sodium Chloride 100 ml @  200 mls/hr Q8HRS IV  Last administered

on 3/18/20at 05:45;  Start 3/17/20 at 20:00;  Stop 3/18/20 at 08:48;  Status DC


Furosemide (Lasix) 40 mg 1X  ONCE IVP  Last administered on 3/17/20at 22:12;  

Start 3/17/20 at 22:30;  Stop 3/17/20 at 22:31;  Status DC


Calcium Chloride 1000 mg/Sodium Chloride 110 ml @  220 mls/hr 1X  ONCE IV  Last 

administered on 3/17/20at 22:11;  Start 3/17/20 at 22:30;  Stop 3/17/20 at 

22:59;  Status DC


Albuterol Sulfate (Ventolin Neb Soln) 2.5 mg 1X  ONCE NEB  Last administered on 

3/18/20at 00:56;  Start 3/17/20 at 22:30;  Stop 3/17/20 at 22:31;  Status DC


Insulin Human Regular (HumuLIN R VIAL) 5 unit 1X  ONCE IV  Last administered on 

3/17/20at 22:14;  Start 3/17/20 at 22:30;  Stop 3/17/20 at 22:31;  Status DC


Magnesium Sulfate 50 ml @ 25 mls/hr 1X  ONCE IV  Last administered on 3/18/20at 

02:57;  Start 3/18/20 at 03:00;  Stop 3/18/20 at 04:59;  Status DC


Calcium Gluconate 1000 mg/Sodium Chloride 110 ml @  220 mls/hr 1X  ONCE IV  Last

administered on 3/18/20at 02:46;  Start 3/18/20 at 03:00;  Stop 3/18/20 at 

03:29;  Status DC


Sodium Chloride 1,000 ml @  200 mls/hr Q5H IV  Last administered on 3/18/20at 

02:46;  Start 3/18/20 at 03:00;  Stop 3/18/20 at 10:21;  Status DC


Calcium Gluconate 1000 mg/Sodium Chloride 110 ml @  220 mls/hr 1X  ONCE IV  Last

administered on 3/18/20at 03:21;  Start 3/18/20 at 03:30;  Stop 3/18/20 at 

03:59;  Status DC


Sodium Bicarbonate 50 meq/Sodium Chloride 1,050 ml @  75 mls/hr Q14H IV  Last 

administered on 3/22/20at 21:10;  Start 3/18/20 at 07:30;  Stop 3/23/20 at 

10:28;  Status DC


Calcium Gluconate 2000 mg/Sodium Chloride 120 ml @  220 mls/hr 1X  ONCE IV  Last

administered on 3/18/20at 09:05;  Start 3/18/20 at 07:30;  Stop 3/18/20 at 

08:02;  Status DC


Lidocaine HCl (Xylocaine-Mpf 1% 2ml Vial) 2 ml STK-MED ONCE .ROUTE ;  Start 

3/18/20 at 08:47;  Stop 3/18/20 at 08:47;  Status DC


Meropenem 500 mg/ Sodium Chloride 50 ml @  100 mls/hr Q12HR IV  Last 

administered on 3/23/20at 21:01;  Start 3/18/20 at 18:00;  Stop 3/24/20 at 

07:58;  Status DC


Lidocaine HCl (Buffered Lidocaine 1%) 3 ml STK-MED ONCE .ROUTE ;  Start 3/18/20 

at 09:46;  Stop 3/18/20 at 09:46;  Status DC


Lidocaine HCl (Buffered Lidocaine 1%) 6 ml 1X  ONCE INJ  Last administered on 

3/18/20at 10:26;  Start 3/18/20 at 10:15;  Stop 3/18/20 at 10:16;  Status DC


Info (Tpn Per Pharmacy) 1 each PRN DAILY  PRN MC SEE COMMENTS Last administered 

on 3/26/20at 10:00;  Start 3/18/20 at 12:00


Sodium Chloride 1,000 ml @  1,000 mls/hr Q1H PRN IV hypotension;  Start 3/18/20 

at 12:07;  Stop 3/18/20 at 18:06;  Status DC


Diphenhydramine HCl (Benadryl) 25 mg 1X PRN  PRN IV ITCHING;  Start 3/18/20 at 

12:15;  Stop 3/19/20 at 12:14;  Status DC


Diphenhydramine HCl (Benadryl) 25 mg 1X PRN  PRN IV ITCHING;  Start 3/18/20 at 

12:15;  Stop 3/19/20 at 12:14;  Status DC


Sodium Chloride 1,000 ml @  400 mls/hr Q2H30M PRN IV PATENCY;  Start 3/18/20 at 

12:07;  Stop 3/19/20 at 00:06;  Status DC


Info (PHARMACY MONITORING -- do not chart) 1 each PRN DAILY  PRN MC SEE 

COMMENTS;  Start 3/18/20 at 12:15;  Stop 3/20/20 at 08:13;  Status DC


Sodium Chloride 90 meq/Calcium Gluconate 10 meq/ Multivitamins 10 ml/Chromium/ 

Copper/Manganese/ Seleni/Zn 1 ml/ Total Parenteral Nutrition/Amino 

Acids/Dextrose/ Fat Emulsion Intravenous 55.005 ml  @ 2.292 mls/hr TPN  CONT IV 

;  Start 3/18/20 at 22:00;  Stop 3/18/20 at 12:33;  Status DC


Info (Tpn Per Pharmacy) 1 each PRN DAILY  PRN MC SEE COMMENTS;  Start 3/18/20 at

12:30;  Status UNV


Sodium Chloride 90 meq/Calcium Gluconate 10 meq/ Multivitamins 10 ml/Chromium/ 

Copper/Manganese/ Seleni/Zn 0.5 ml/ Total Parenteral Nutrition/Amino 

Acids/Dextrose/ Fat Emulsion Intravenous 1,512 ml @  63 mls/hr TPN  CONT IV  

Last administered on 3/18/20at 22:06;  Start 3/18/20 at 22:00;  Stop 3/19/20 at 

21:59;  Status DC


Calcium Carbonate/ Glycine (Tums) 500 mg PRN AFTMEALHC  PRN PO INDIGESTION;  

Start 3/18/20 at 17:45


Calcium Gluconate (Calcium Gluconate) 2,000 mg 1X  ONCE IVP  Last administered 

on 3/19/20at 02:19;  Start 3/19/20 at 02:15;  Stop 3/19/20 at 02:16;  Status DC


Calcium Chloride 3000 mg/Sodium Chloride 1,030 ml @  50 mls/hr [id number] IV  Last 

administered on 3/21/20at 02:17;  Start 3/19/20 at 08:00;  Stop 3/21/20 at 

15:23;  Status DC


Lorazepam (Ativan Inj) 1 mg PRN Q4HRS  PRN IVP ANXIETY / AGITATION Last 

administered on 3/23/20at 00:34;  Start 3/19/20 at 09:00


Sodium Chloride 1,000 ml @  1,000 mls/hr Q1H PRN IV hypotension;  Start 3/19/20 

at 08:56;  Stop 3/19/20 at 14:55;  Status DC


Albumin Human 200 ml @  200 mls/hr 1X PRN  PRN IV Hypotension;  Start 3/19/20 at

09:00;  Stop 3/19/20 at 14:59;  Status DC


Diphenhydramine HCl (Benadryl) 25 mg 1X PRN  PRN IV ITCHING;  Start 3/19/20 at 

09:00;  Stop 3/20/20 at 08:59;  Status DC


Diphenhydramine HCl (Benadryl) 25 mg 1X PRN  PRN IV ITCHING;  Start 3/19/20 at 

09:00;  Stop 3/20/20 at 08:59;  Status DC


Sodium Chloride 1,000 ml @  400 mls/hr Q2H30M PRN IV PATENCY;  Start 3/19/20 at 

08:56;  Stop 3/19/20 at 20:55;  Status DC


Info (PHARMACY MONITORING -- do not chart) 1 each PRN DAILY  PRN MC SEE 

COMMENTS;  Start 3/19/20 at 09:00;  Status UNV


Info (PHARMACY MONITORING -- do not chart) 1 each PRN DAILY  PRN MC SEE 

COMMENTS;  Start 3/19/20 at 09:00;  Stop 3/20/20 at 08:13;  Status DC


Digoxin (Lanoxin) 500 mcg 1X  ONCE IV  Last administered on 3/19/20at 10:04;  

Start 3/19/20 at 10:00;  Stop 3/19/20 at 10:01;  Status DC


Digoxin (Lanoxin) 125 mcg 1X  ONCE IV  Last administered on 3/19/20at 17:10;  

Start 3/19/20 at 18:00;  Stop 3/19/20 at 18:01;  Status DC


Magnesium Sulfate 100 ml @  25 mls/hr 1X  ONCE IV  Last administered on 

3/19/20at 12:48;  Start 3/19/20 at 13:00;  Stop 3/19/20 at 16:59;  Status DC


Sodium Chloride 90 meq/Magnesium Sulfate 10 meq/ Calcium Gluconate 20 meq/ 

Multivitamins 10 ml/Chromium/ Copper/Manganese/ Seleni/Zn 0.5 ml/ Total 

Parenteral Nutrition/Amino Acids/Dextrose/ Fat Emulsion Intravenous 1,512 ml @  

63 mls/hr TPN  CONT IV  Last administered on 3/19/20at 22:25;  Start 3/19/20 at 

22:00;  Stop 3/20/20 at 21:59;  Status DC


Sodium Chloride 1,000 ml @  1,000 mls/hr Q1H PRN IV hypotension;  Start 3/20/20 

at 08:05;  Stop 3/20/20 at 14:04;  Status DC


Albumin Human 200 ml @  200 mls/hr 1X  ONCE IV  Last administered on 3/20/20at 

08:57;  Start 3/20/20 at 08:15;  Stop 3/20/20 at 09:14;  Status DC


Diphenhydramine HCl (Benadryl) 25 mg 1X PRN  PRN IV ITCHING;  Start 3/20/20 at 

08:15;  Stop 3/21/20 at 08:14;  Status DC


Diphenhydramine HCl (Benadryl) 25 mg 1X PRN  PRN IV ITCHING;  Start 3/20/20 at 

08:15;  Stop 3/21/20 at 08:14;  Status DC


Sodium Chloride 1,000 ml @  400 mls/hr Q2H30M PRN IV PATENCY;  Start 3/20/20 at 

08:05;  Stop 3/20/20 at 20:04;  Status DC


Info (PHARMACY MONITORING -- do not chart) 1 each PRN DAILY  PRN MC SEE 

COMMENTS;  Start 3/20/20 at 08:15;  Stop 3/24/20 at 07:57;  Status DC


Sodium Chloride 90 meq/Potassium Chloride 15 meq/ Potassium Phosphate 10 mmol/ 

Magnesium Sulfate 10 meq/Calcium Gluconate 20 meq/ Multivitamins 10 ml/Chromium/

Copper/Manganese/ Seleni/Zn 0.5 ml/ Total Parenteral Nutrition/Amino 

Acids/Dextrose/ Fat Emulsion Intravenous 1,512 ml @  63 mls/hr TPN  CONT IV  

Last administered on 3/20/20at 21:01;  Start 3/20/20 at 22:00;  Stop 3/21/20 at 

21:59;  Status DC


Potassium Chloride/Water 100 ml @  100 mls/hr 1X  ONCE IV  Last administered on 

3/20/20at 14:09;  Start 3/20/20 at 14:00;  Stop 3/20/20 at 14:59;  Status DC


Benzocaine (Hurricaine One) 1 spray 1X  ONCE MM  Last administered on 3/20/20at 

16:38;  Start 3/20/20 at 14:30;  Stop 3/20/20 at 14:31;  Status DC


Lidocaine HCl (Glydo (Lidocaine) Jelly) 1 thomas 1X  ONCE MM  Last administered on 

3/20/20at 16:38;  Start 3/20/20 at 14:30;  Stop 3/20/20 at 14:31;  Status DC


Linezolid/Dextrose 300 ml @  300 mls/hr Q12HR IV  Last administered on 3/26/20at

21:04;  Start 3/20/20 at 20:00;  Stop 3/27/20 at 07:50;  Status DC


Acetaminophen (Tylenol) 650 mg PRN Q6HRS  PRN PO MILD PAIN / TEMP;  Start 

3/21/20 at 03:30;  Stop 3/21/20 at 03:36;  Status DC


Acetaminophen (Tylenol) 650 mg PRN Q6HRS  PRN PEG MILD PAIN / TEMP Last 

administered on 3/26/20at 07:35;  Start 3/21/20 at 03:36


Sodium Chloride 1,000 ml @  1,000 mls/hr Q1H PRN IV hypotension;  Start 3/21/20 

at 07:50;  Stop 3/21/20 at 13:49;  Status DC


Albumin Human 200 ml @  200 mls/hr 1X PRN  PRN IV Hypotension;  Start 3/21/20 at

08:00;  Stop 3/21/20 at 13:59;  Status DC


Sodium Chloride (Normal Saline Flush) 10 ml 1X PRN  PRN IV AP catheter pack;  

Start 3/21/20 at 08:00;  Stop 3/22/20 at 07:59;  Status DC


Sodium Chloride (Normal Saline Flush) 10 ml 1X PRN  PRN IV  catheter pack;  

Start 3/21/20 at 08:00;  Stop 3/22/20 at 07:59;  Status DC


Sodium Chloride 1,000 ml @  400 mls/hr Q2H30M PRN IV PATENCY;  Start 3/21/20 at 

07:50;  Stop 3/21/20 at 19:49;  Status DC


Info (PHARMACY MONITORING -- do not chart) 1 each PRN DAILY  PRN MC SEE 

COMMENTS;  Start 3/21/20 at 08:00;  Status UNV


Info (PHARMACY MONITORING -- do not chart) 1 each PRN DAILY  PRN MC SEE 

COMMENTS;  Start 3/21/20 at 08:00;  Stop 3/23/20 at 08:25;  Status DC


Sodium Chloride 90 meq/Potassium Chloride 15 meq/ Potassium Phosphate 10 mmol/ 

Magnesium Sulfate 10 meq/Calcium Gluconate 20 meq/ Multivitamins 10 ml/Chromium/

Copper/Manganese/ Seleni/Zn 0.5 ml/ Total Parenteral Nutrition/Amino 

Acids/Dextrose/ Fat Emulsion Intravenous 1,512 ml @  63 mls/hr TPN  CONT IV  

Last administered on 3/21/20at 20:57;  Start 3/21/20 at 22:00;  Stop 3/22/20 at 

21:59;  Status DC


Sodium Chloride 90 meq/Potassium Chloride 15 meq/ Potassium Phosphate 15 mmol/ 

Magnesium Sulfate 10 meq/Calcium Gluconate 20 meq/ Multivitamins 10 ml/Chromium/

Copper/Manganese/ Seleni/Zn 0.5 ml/ Total Parenteral Nutrition/Amino 

Acids/Dextrose/ Fat Emulsion Intravenous 1,512 ml @  63 mls/hr TPN  CONT IV ;  

Start 3/22/20 at 22:00;  Stop 3/22/20 at 14:16;  Status DC


Sodium Chloride 90 meq/Potassium Chloride 15 meq/ Potassium Phosphate 15 mmol/ 

Magnesium Sulfate 10 meq/Calcium Gluconate 20 meq/ Multivitamins 10 ml/Chromium/

Copper/Manganese/ Seleni/Zn 0.5 ml/ Total Parenteral Nutrition/Amino 

Acids/Dextrose/ Fat Emulsion Intravenous 1,200 ml @  50 mls/hr TPN  CONT IV ;  

Start 3/22/20 at 22:00;  Stop 3/22/20 at 14:17;  Status DC


Sodium Chloride 90 meq/Potassium Chloride 15 meq/ Potassium Phosphate 10 mmol/ 

Magnesium Sulfate 10 meq/Calcium Gluconate 20 meq/ Multivitamins 10 ml/Chromium/

Copper/Manganese/ Seleni/Zn 0.5 ml/ Total Parenteral Nutrition/Amino 

Acids/Dextrose/ Fat Emulsion Intravenous 1,200 ml @  50 mls/hr TPN  CONT IV  

Last administered on 3/22/20at 23:29;  Start 3/22/20 at 22:00;  Stop 3/23/20 at 

21:59;  Status DC


Sodium Chloride 1,000 ml @  1,000 mls/hr Q1H PRN IV hypotension;  Start 3/23/20 

at 07:28;  Stop 3/23/20 at 13:27;  Status DC


Albumin Human 200 ml @  200 mls/hr 1X  ONCE IV  Last administered on 3/23/20at 

08:51;  Start 3/23/20 at 07:30;  Stop 3/23/20 at 08:29;  Status DC


Diphenhydramine HCl (Benadryl) 25 mg 1X PRN  PRN IV ITCHING;  Start 3/23/20 at 

07:30;  Stop 3/24/20 at 07:29;  Status DC


Diphenhydramine HCl (Benadryl) 25 mg 1X PRN  PRN IV ITCHING;  Start 3/23/20 at 

07:30;  Stop 3/24/20 at 07:29;  Status DC


Sodium Chloride 1,000 ml @  400 mls/hr Q2H30M PRN IV PATENCY;  Start 3/23/20 at 

07:28;  Stop 3/23/20 at 19:27;  Status DC


Info (PHARMACY MONITORING -- do not chart) 1 each PRN DAILY  PRN MC SEE 

COMMENTS;  Start 3/23/20 at 07:30


Metronidazole 100 ml @  100 mls/hr Q6HRS IV  Last administered on 3/27/20at 

05:59;  Start 3/23/20 at 08:30


Micafungin Sodium 100 mg/Dextrose 100 ml @  100 mls/hr Q24H IV  Last a

dministered on 3/27/20at 09:01;  Start 3/23/20 at 09:00


Propofol 0 ml @ As Directed STK-MED ONCE IV ;  Start 3/23/20 at 07:53;  Stop 

3/23/20 at 07:53;  Status DC


Etomidate (Amidate) 20 mg STK-MED ONCE IV ;  Start 3/23/20 at 07:53;  Stop 

3/23/20 at 07:54;  Status DC


Midazolam HCl (Versed) 5 mg STK-MED ONCE .ROUTE ;  Start 3/23/20 at 07:57;  Stop

3/23/20 at 07:57;  Status DC


Fentanyl Citrate 30 ml @ 0 mls/hr CONT  PRN IV SEE PROTOCOL Last administered on

3/27/20at 00:53;  Start 3/23/20 at 08:15


Artificial Tears (Artificial Tears) 1 drop PRN Q1HR  PRN OU DRY EYE;  Start 

3/23/20 at 08:15


Midazolam HCl 50 mg/Sodium Chloride 50 ml @ 0 mls/hr CONT  PRN IV SEE PROTOCOL 

Last administered on 3/26/20at 22:39;  Start 3/23/20 at 08:15


Etomidate (Amidate) 8 mg 1X  ONCE IV  Last administered on 3/23/20at 08:33;  

Start 3/23/20 at 08:30;  Stop 3/23/20 at 08:31;  Status DC


Succinylcholine Chloride (Anectine) 120 mg 1X  ONCE IV  Last administered on 

3/23/20at 08:34;  Start 3/23/20 at 08:30;  Stop 3/23/20 at 08:31;  Status DC


Midazolam HCl (Versed) 5 mg 1X  ONCE IV ;  Start 3/23/20 at 08:30;  Stop 3/23/20

at 08:31;  Status DC


Potassium Chloride 15 meq/ Bicarbonate Dialysis Soln w/ out KCl 5,007.5 ml  @ 

1,000 mls/ hr Q5H1M IV  Last administered on 3/24/20at 11:11;  Start 3/23/20 at 

12:00;  Stop 3/24/20 at 11:15;  Status DC


Potassium Chloride 15 meq/ Bicarbonate Dialysis Soln w/ out KCl 5,007.5 ml  @ 

1,000 mls/ hr Q5H1M IV  Last administered on 3/24/20at 11:12;  Start 3/23/20 at 

12:00;  Stop 3/24/20 at 11:17;  Status DC


Potassium Chloride 15 meq/ Bicarbonate Dialysis Soln w/ out KCl 5,007.5 ml  @ 

1,000 mls/ hr Q5H1M IV  Last administered on 3/24/20at 11:11;  Start 3/23/20 at 

12:00;  Stop 3/24/20 at 11:19;  Status DC


Sodium Chloride 90 meq/Potassium Chloride 15 meq/ Potassium Phosphate 10 mmol/ 

Magnesium Sulfate 10 meq/Calcium Gluconate 20 meq/ Multivitamins 10 ml/Chromium/

Copper/Manganese/ Seleni/Zn 0.5 ml/ Total Parenteral Nutrition/Amino 

Acids/Dextrose/ Fat Emulsion Intravenous 1,400 ml @  58.333 mls/ hr TPN  CONT IV

 Last administered on 3/23/20at 21:42;  Start 3/23/20 at 22:00;  Stop 3/24/20 at

21:59;  Status DC


Heparin Sodium (Porcine) (Heparin Sodium) 5,000 unit Q8HRS SQ  Last administered

on 3/26/20at 05:35;  Start 3/23/20 at 15:00


Meropenem 500 mg/ Sodium Chloride 50 ml @  100 mls/hr Q6HRS IV  Last 

administered on 3/25/20at 06:00;  Start 3/24/20 at 09:00;  Stop 3/25/20 at 

07:29;  Status DC


Potassium Phosphate 20 mmol/ Sodium Chloride 106.6667 ml @  51.667 m... 1X  ONCE

IV  Last administered on 3/24/20at 11:22;  Start 3/24/20 at 10:15;  Stop 3/24/20

at 12:18;  Status DC


Acetaminophen (Tylenol Supp) 650 mg PRN Q6HRS  PRN AK MILD PAIN / TEMP Last 

administered on 3/24/20at 10:37;  Start 3/24/20 at 10:30


Potassium Chloride/Water 100 ml @  100 mls/hr Q1H IV  Last administered on 3/24/

20at 12:12;  Start 3/24/20 at 11:00;  Stop 3/24/20 at 12:59;  Status DC


Potassium Chloride 20 meq/ Bicarbonate Dialysis Soln w/ out KCl 5,010 ml @  

1,000 mls/hr Q5H1M IV  Last administered on 3/25/20at 08:48;  Start 3/24/20 at 

12:00;  Stop 3/25/20 at 13:03;  Status DC


Potassium Chloride 20 meq/ Bicarbonate Dialysis Soln w/ out KCl 5,010 ml @  

1,000 mls/hr Q5H1M IV  Last administered on 3/27/20at 09:05;  Start 3/24/20 at 

11:30


Potassium Chloride 20 meq/ Bicarbonate Dialysis Soln w/ out KCl 5,010 ml @  

1,000 mls/hr Q5H1M IV  Last administered on 3/27/20at 09:04;  Start 3/24/20 at 

11:30


Sodium Chloride 90 meq/Potassium Chloride 15 meq/ Potassium Phosphate 15 mmol/ 

Magnesium Sulfate 10 meq/Calcium Gluconate 15 meq/ Multivitamins 10 ml/Chromium/

Copper/Manganese/ Seleni/Zn 0.5 ml/ Total Parenteral Nutrition/Amino 

Acids/Dextrose/ Fat Emulsion Intravenous 1,400 ml @  58.333 mls/ hr TPN  CONT IV

 Last administered on 3/24/20at 22:17;  Start 3/24/20 at 22:00;  Stop 3/25/20 at

21:59;  Status DC


Cefepime HCl (Maxipime) 2 gm Q12HR IVP  Last administered on 3/27/20at 09:00;  

Start 3/25/20 at 09:00


Daptomycin 500 mg/ Sodium Chloride 50 ml @  100 mls/hr Q48H IV  Last 

administered on 3/25/20at 08:45;  Start 3/25/20 at 08:30


Lidocaine HCl (Buffered Lidocaine 1%) 3 ml 1X  ONCE INJ  Last administered on 

3/25/20at 10:27;  Start 3/25/20 at 10:30;  Stop 3/25/20 at 10:31;  Status DC


Potassium Phosphate 20 mmol/ Sodium Chloride 106.6667 ml @  51.667 m... 1X  ONCE

IV  Last administered on 3/25/20at 12:51;  Start 3/25/20 at 13:00;  Stop 3/25/20

at 15:03;  Status DC


Sodium Chloride 90 meq/Potassium Chloride 15 meq/ Potassium Phosphate 18 mmol/ 

Magnesium Sulfate 8 meq/Calcium Gluconate 15 meq/ Multivitamins 10 ml/Chromium/ 

Copper/Manganese/ Seleni/Zn 0.5 ml/ Total Parenteral Nutrition/Amino 

Acids/Dextrose/ Fat Emulsion Intravenous 1,400 ml @  58.333 mls/ hr TPN  CONT IV

 Last administered on 3/25/20at 22:16;  Start 3/25/20 at 22:00;  Stop 3/26/20 at

21:59;  Status DC


Potassium Chloride 20 meq/ Bicarbonate Dialysis Soln w/ out KCl 5,010 ml @  

1,000 mls/hr Q5H1M IV  Last administered on 3/27/20at 09:04;  Start 3/25/20 at 

16:00


Multi-Ingred Cream/Lotion/Oil/ Oint (Artificial Tears Eye Ointment) 1 thomas PRN 

Q1HR  PRN OU DRY EYE;  Start 3/25/20 at 17:30


Sodium Chloride 90 meq/Potassium Chloride 15 meq/ Potassium Phosphate 18 mmol/ 

Magnesium Sulfate 8 meq/Calcium Gluconate 15 meq/ Multivitamins 10 ml/Chromium/ 

Copper/Manganese/ Seleni/Zn 0.5 ml/ Total Parenteral Nutrition/Amino 

Acids/Dextrose/ Fat Emulsion Intravenous 1,400 ml @  58.333 mls/ hr TPN  CONT IV

 Last administered on 3/26/20at 22:00;  Start 3/26/20 at 22:00;  Stop 3/27/20 at

21:59


Albumin Human 500 ml @  125 mls/hr 1X  ONCE IV ;  Start 3/26/20 at 14:15;  Stop 

3/26/20 at 18:14;  Status DC





Active Scripts


Active


Reported


Bisoprolol Fumarate 5 Mg Tablet 10 Mg PO DAILY


Vitals/I & O





Vital Sign - Last 24 Hours








 3/26/20 3/26/20 3/26/20 3/26/20





 09:33 10:00 10:12 10:31


 


Temp  100.2 100.2 100.6





  100.2 100.2 100.6


 


Pulse  108 107 106


 


Resp  24 27 25


 


B/P (MAP)  101/56 (71) 104/50 104/60


 


Pulse Ox 100 100  


 


O2 Delivery Ventilator Ventilator  


 


    





    





 3/26/20 3/26/20 3/26/20 3/26/20





 10:45 11:00 12:00 12:00


 


Temp    98.8





    98.8


 


Pulse 100 96  99


 


Resp 24 24 27


 


B/P (MAP) 99/57 112/51 (71)  105/58 (74)


 


Pulse Ox  100  99


 


O2 Delivery  Ventilator Mechanical Ventilator Ventilator


 


    





    





 3/26/20 3/26/20 3/26/20 3/26/20





 12:12 12:15 13:00 14:00


 


Temp  98.8  





  98.8  


 


Pulse  99 102 94


 


Resp  27 25 26


 


B/P (MAP)  105/58 99/55 (70) 109/43 (65)


 


Pulse Ox 99  100 100


 


O2 Delivery Ventilator  Ventilator Ventilator


 


    





    





 3/26/20 3/26/20 3/26/20 3/26/20





 15:00 16:00 16:00 16:23


 


Temp   98.4 





   98.4 


 


Pulse 97  100 


 


Resp 28 26 


 


B/P (MAP) 87/51 (63)  115/77 (90) 


 


Pulse Ox 100  97 100


 


O2 Delivery Ventilator Mechanical Ventilator Ventilator Ventilator


 


    





    





 3/26/20 3/26/20 3/26/20 3/26/20





 17:00 18:00 19:00 20:00


 


Pulse 104 96 98 


 


Resp 26 26 20 


 


B/P (MAP) 108/64 (79) 120/62 (81) 110/58 (75) 


 


Pulse Ox 98 98 99 


 


O2 Delivery Ventilator Ventilator Ventilator Mechanical Ventilator





 3/26/20 3/26/20 3/26/20 3/26/20





 20:00 20:58 21:00 22:00


 


Temp 98.2   





 98.2   


 


Pulse 96  99 96


 


Resp 24  18 18


 


B/P (MAP) 99/64 (76)  107/58 (74) 102/69 (80)


 


Pulse Ox 99 100 99 98


 


O2 Delivery Ventilator Ventilator Ventilator Ventilator


 


    





    





 3/26/20 3/26/20 3/26/20 3/26/20





 23:03 23:16 23:33 23:59


 


Temp    98.5





    98.5


 


Pulse 96  96 96


 


Resp 20   20


 


B/P (MAP) 93/55 (68)  93/55 79/54 (62)


 


Pulse Ox 98 100  98


 


O2 Delivery Ventilator Ventilator  Ventilator


 


    





    





 3/26/20 3/27/20 3/27/20 3/27/20





 23:59 00:14 00:53 01:00


 


Pulse  95  98


 


Resp  18  19


 


B/P (MAP)  83/47 (59)  85/68 (74)


 


Pulse Ox  99 99 100


 


O2 Delivery Mechanical Ventilator Ventilator  Ventilator


 


O2 Flow Rate   6.0 





 3/27/20 3/27/20 3/27/20 3/27/20





 01:37 02:00 02:06 03:00


 


Pulse  98  101


 


Resp  19  18


 


B/P (MAP)  92/57 (69)  104/57 (73)


 


Pulse Ox 100 100 100 98


 


O2 Delivery Ventilator Ventilator  Ventilator


 


O2 Flow Rate   6.0 





 3/27/20 3/27/20 3/27/20 3/27/20





 03:32 04:00 04:00 05:03


 


Temp  98.8  





  98.8  


 


Pulse  102  108


 


Resp  20  20


 


B/P (MAP)  93/65 (74)  106/66 (79)


 


Pulse Ox 100 100  100


 


O2 Delivery Ventilator Ventilator Mechanical Ventilator Ventilator


 


    





    





 3/27/20 3/27/20 3/27/20 





 05:38 06:00 06:00 


 


Pulse  108 111 


 


Resp   18 


 


B/P (MAP)  92/67 100/65 (77) 


 


Pulse Ox 100  98 


 


O2 Delivery Ventilator  Ventilator 














Intake and Output   


 


 3/26/20 3/26/20 3/27/20





 15:00 23:00 07:00


 


Intake Total 500 ml 1098.47 ml 1143 ml


 


Output Total 210 ml 155 ml 205 ml


 


Balance 290 ml 943.47 ml 938 ml

















CLEMENTINA PANTOJA MD           Mar 27, 2020 09:35

## 2020-03-28 VITALS — SYSTOLIC BLOOD PRESSURE: 111 MMHG | DIASTOLIC BLOOD PRESSURE: 69 MMHG

## 2020-03-28 VITALS — DIASTOLIC BLOOD PRESSURE: 59 MMHG | SYSTOLIC BLOOD PRESSURE: 108 MMHG

## 2020-03-28 VITALS — DIASTOLIC BLOOD PRESSURE: 61 MMHG | SYSTOLIC BLOOD PRESSURE: 78 MMHG

## 2020-03-28 VITALS — DIASTOLIC BLOOD PRESSURE: 56 MMHG | SYSTOLIC BLOOD PRESSURE: 103 MMHG

## 2020-03-28 VITALS — DIASTOLIC BLOOD PRESSURE: 77 MMHG | SYSTOLIC BLOOD PRESSURE: 97 MMHG

## 2020-03-28 VITALS — SYSTOLIC BLOOD PRESSURE: 113 MMHG | DIASTOLIC BLOOD PRESSURE: 57 MMHG

## 2020-03-28 VITALS — SYSTOLIC BLOOD PRESSURE: 85 MMHG | DIASTOLIC BLOOD PRESSURE: 78 MMHG

## 2020-03-28 VITALS — DIASTOLIC BLOOD PRESSURE: 72 MMHG | SYSTOLIC BLOOD PRESSURE: 119 MMHG

## 2020-03-28 VITALS — DIASTOLIC BLOOD PRESSURE: 58 MMHG | SYSTOLIC BLOOD PRESSURE: 133 MMHG

## 2020-03-28 VITALS — SYSTOLIC BLOOD PRESSURE: 130 MMHG | DIASTOLIC BLOOD PRESSURE: 82 MMHG

## 2020-03-28 VITALS — SYSTOLIC BLOOD PRESSURE: 84 MMHG | DIASTOLIC BLOOD PRESSURE: 62 MMHG

## 2020-03-28 VITALS — SYSTOLIC BLOOD PRESSURE: 97 MMHG | DIASTOLIC BLOOD PRESSURE: 59 MMHG

## 2020-03-28 VITALS — SYSTOLIC BLOOD PRESSURE: 113 MMHG | DIASTOLIC BLOOD PRESSURE: 85 MMHG

## 2020-03-28 VITALS — SYSTOLIC BLOOD PRESSURE: 101 MMHG | DIASTOLIC BLOOD PRESSURE: 50 MMHG

## 2020-03-28 VITALS — SYSTOLIC BLOOD PRESSURE: 117 MMHG | DIASTOLIC BLOOD PRESSURE: 64 MMHG

## 2020-03-28 VITALS — SYSTOLIC BLOOD PRESSURE: 126 MMHG | DIASTOLIC BLOOD PRESSURE: 73 MMHG

## 2020-03-28 VITALS — DIASTOLIC BLOOD PRESSURE: 62 MMHG | SYSTOLIC BLOOD PRESSURE: 119 MMHG

## 2020-03-28 VITALS — DIASTOLIC BLOOD PRESSURE: 65 MMHG | SYSTOLIC BLOOD PRESSURE: 114 MMHG

## 2020-03-28 VITALS — DIASTOLIC BLOOD PRESSURE: 62 MMHG | SYSTOLIC BLOOD PRESSURE: 76 MMHG

## 2020-03-28 VITALS — DIASTOLIC BLOOD PRESSURE: 89 MMHG | SYSTOLIC BLOOD PRESSURE: 123 MMHG

## 2020-03-28 VITALS — DIASTOLIC BLOOD PRESSURE: 62 MMHG | SYSTOLIC BLOOD PRESSURE: 111 MMHG

## 2020-03-28 VITALS — DIASTOLIC BLOOD PRESSURE: 65 MMHG | SYSTOLIC BLOOD PRESSURE: 119 MMHG

## 2020-03-28 VITALS — SYSTOLIC BLOOD PRESSURE: 95 MMHG | DIASTOLIC BLOOD PRESSURE: 58 MMHG

## 2020-03-28 VITALS — DIASTOLIC BLOOD PRESSURE: 84 MMHG | SYSTOLIC BLOOD PRESSURE: 119 MMHG

## 2020-03-28 VITALS — SYSTOLIC BLOOD PRESSURE: 97 MMHG | DIASTOLIC BLOOD PRESSURE: 71 MMHG

## 2020-03-28 VITALS — SYSTOLIC BLOOD PRESSURE: 105 MMHG | DIASTOLIC BLOOD PRESSURE: 98 MMHG

## 2020-03-28 LAB
ANION GAP SERPL CALC-SCNC: 6 MMOL/L (ref 6–14)
ANION GAP SERPL CALC-SCNC: 6 MMOL/L (ref 6–14)
ANION GAP SERPL CALC-SCNC: 7 MMOL/L (ref 6–14)
ANION GAP SERPL CALC-SCNC: 8 MMOL/L (ref 6–14)
BASE EXCESS ABG: -3 MMOL/L (ref -3–3)
BASOPHILS # BLD AUTO: 0.1 X10^3/UL (ref 0–0.2)
BASOPHILS NFR BLD: 0 % (ref 0–3)
BUN SERPL-MCNC: 38 MG/DL (ref 7–20)
BUN SERPL-MCNC: 38 MG/DL (ref 7–20)
BUN SERPL-MCNC: 41 MG/DL (ref 7–20)
BUN SERPL-MCNC: 43 MG/DL (ref 7–20)
CALCIUM SERPL-MCNC: 7.9 MG/DL (ref 8.5–10.1)
CALCIUM SERPL-MCNC: 8 MG/DL (ref 8.5–10.1)
CALCIUM SERPL-MCNC: 8.1 MG/DL (ref 8.5–10.1)
CALCIUM SERPL-MCNC: 8.2 MG/DL (ref 8.5–10.1)
CHLORIDE SERPL-SCNC: 105 MMOL/L (ref 98–107)
CHLORIDE SERPL-SCNC: 106 MMOL/L (ref 98–107)
CO2 SERPL-SCNC: 26 MMOL/L (ref 21–32)
CO2 SERPL-SCNC: 26 MMOL/L (ref 21–32)
CO2 SERPL-SCNC: 27 MMOL/L (ref 21–32)
CO2 SERPL-SCNC: 28 MMOL/L (ref 21–32)
CREAT SERPL-MCNC: 1.3 MG/DL (ref 0.6–1)
CREAT SERPL-MCNC: 1.4 MG/DL (ref 0.6–1)
CREAT SERPL-MCNC: 1.4 MG/DL (ref 0.6–1)
CREAT SERPL-MCNC: 1.5 MG/DL (ref 0.6–1)
EOSINOPHIL NFR BLD: 0.4 X10^3/UL (ref 0–0.7)
EOSINOPHIL NFR BLD: 1 % (ref 0–3)
ERYTHROCYTE [DISTWIDTH] IN BLOOD BY AUTOMATED COUNT: 15.7 % (ref 11.5–14.5)
GFR SERPLBLD BASED ON 1.73 SQ M-ARVRAT: 36.9 ML/MIN
GFR SERPLBLD BASED ON 1.73 SQ M-ARVRAT: 40 ML/MIN
GFR SERPLBLD BASED ON 1.73 SQ M-ARVRAT: 40 ML/MIN
GFR SERPLBLD BASED ON 1.73 SQ M-ARVRAT: 43.5 ML/MIN
GLUCOSE SERPL-MCNC: 210 MG/DL (ref 70–99)
GLUCOSE SERPL-MCNC: 233 MG/DL (ref 70–99)
GLUCOSE SERPL-MCNC: 235 MG/DL (ref 70–99)
GLUCOSE SERPL-MCNC: 258 MG/DL (ref 70–99)
HCO3 BLDA-SCNC: 23 MMOL/L (ref 21–28)
HCT VFR BLD CALC: 23.4 % (ref 36–47)
HGB BLD-MCNC: 7.6 G/DL (ref 12–15.5)
INSPIRATION SETTING TIME VENT: (no result)
LYMPHOCYTES # BLD: 1.9 X10^3/UL (ref 1–4.8)
LYMPHOCYTES NFR BLD AUTO: 5 % (ref 24–48)
MAGNESIUM SERPL-MCNC: 2.1 MG/DL (ref 1.8–2.4)
MAGNESIUM SERPL-MCNC: 2.2 MG/DL (ref 1.8–2.4)
MAGNESIUM SERPL-MCNC: 2.2 MG/DL (ref 1.8–2.4)
MAGNESIUM SERPL-MCNC: 2.3 MG/DL (ref 1.8–2.4)
MCH RBC QN AUTO: 32 PG (ref 25–35)
MCHC RBC AUTO-ENTMCNC: 33 G/DL (ref 31–37)
MCV RBC AUTO: 98 FL (ref 79–100)
MONO #: 1.2 X10^3/UL (ref 0–1.1)
MONOCYTES NFR BLD: 3 % (ref 0–9)
NEUT #: 34.5 X10^3/UL (ref 1.8–7.7)
NEUTROPHILS NFR BLD AUTO: 91 % (ref 31–73)
PCO2 BLDA: 43 MMHG (ref 35–46)
PLATELET # BLD AUTO: 308 X10^3/UL (ref 140–400)
PO2 BLDA: 79 MMHG (ref 75–108)
POTASSIUM SERPL-SCNC: 4.3 MMOL/L (ref 3.5–5.1)
POTASSIUM SERPL-SCNC: 4.4 MMOL/L (ref 3.5–5.1)
POTASSIUM SERPL-SCNC: 4.4 MMOL/L (ref 3.5–5.1)
POTASSIUM SERPL-SCNC: 4.6 MMOL/L (ref 3.5–5.1)
RBC # BLD AUTO: 2.38 X10^6/UL (ref 3.5–5.4)
SAO2 % BLDA: 93 % (ref 92–99)
SODIUM SERPL-SCNC: 138 MMOL/L (ref 136–145)
SODIUM SERPL-SCNC: 139 MMOL/L (ref 136–145)
WBC # BLD AUTO: 38.1 X10^3/UL (ref 4–11)

## 2020-03-28 RX ADMIN — INSULIN LISPRO SCH UNITS: 100 INJECTION, SOLUTION INTRAVENOUS; SUBCUTANEOUS at 05:52

## 2020-03-28 RX ADMIN — INSULIN LISPRO SCH UNITS: 100 INJECTION, SOLUTION INTRAVENOUS; SUBCUTANEOUS at 11:48

## 2020-03-28 RX ADMIN — POTASSIUM CHLORIDE SCH MLS/HR: 2 INJECTION, SOLUTION, CONCENTRATE INTRAVENOUS at 00:59

## 2020-03-28 RX ADMIN — INSULIN LISPRO SCH UNITS: 100 INJECTION, SOLUTION INTRAVENOUS; SUBCUTANEOUS at 00:38

## 2020-03-28 RX ADMIN — Medication PRN EACH: at 14:47

## 2020-03-28 RX ADMIN — POTASSIUM CHLORIDE SCH MLS/HR: 2 INJECTION, SOLUTION, CONCENTRATE INTRAVENOUS at 17:37

## 2020-03-28 RX ADMIN — AMIODARONE HYDROCHLORIDE PRN MLS/HR: 50 INJECTION, SOLUTION INTRAVENOUS at 10:11

## 2020-03-28 RX ADMIN — POTASSIUM CHLORIDE SCH MLS/HR: 2 INJECTION, SOLUTION, CONCENTRATE INTRAVENOUS at 07:39

## 2020-03-28 RX ADMIN — POTASSIUM CHLORIDE SCH MLS/HR: 2 INJECTION, SOLUTION, CONCENTRATE INTRAVENOUS at 11:54

## 2020-03-28 RX ADMIN — POTASSIUM CHLORIDE SCH MLS/HR: 2 INJECTION, SOLUTION, CONCENTRATE INTRAVENOUS at 06:45

## 2020-03-28 RX ADMIN — INSULIN LISPRO SCH UNITS: 100 INJECTION, SOLUTION INTRAVENOUS; SUBCUTANEOUS at 17:50

## 2020-03-28 RX ADMIN — POTASSIUM CHLORIDE SCH MLS/HR: 2 INJECTION, SOLUTION, CONCENTRATE INTRAVENOUS at 06:44

## 2020-03-28 RX ADMIN — HEPARIN SODIUM SCH UNIT: 5000 INJECTION, SOLUTION INTRAVENOUS; SUBCUTANEOUS at 05:55

## 2020-03-28 RX ADMIN — METOPROLOL TARTRATE SCH MG: 5 INJECTION INTRAVENOUS at 06:00

## 2020-03-28 RX ADMIN — ALBUMIN (HUMAN) PRN MLS/HR: 12.5 INJECTION, SOLUTION INTRAVENOUS at 22:17

## 2020-03-28 RX ADMIN — METOPROLOL TARTRATE SCH MG: 5 INJECTION INTRAVENOUS at 00:00

## 2020-03-28 RX ADMIN — POTASSIUM CHLORIDE SCH MLS/HR: 2 INJECTION, SOLUTION, CONCENTRATE INTRAVENOUS at 23:35

## 2020-03-28 RX ADMIN — INSULIN LISPRO SCH UNITS: 100 INJECTION, SOLUTION INTRAVENOUS; SUBCUTANEOUS at 23:34

## 2020-03-28 RX ADMIN — POTASSIUM CHLORIDE SCH MLS/HR: 2 INJECTION, SOLUTION, CONCENTRATE INTRAVENOUS at 17:38

## 2020-03-28 RX ADMIN — POTASSIUM CHLORIDE SCH MLS/HR: 2 INJECTION, SOLUTION, CONCENTRATE INTRAVENOUS at 07:40

## 2020-03-28 RX ADMIN — POTASSIUM CHLORIDE SCH MLS/HR: 2 INJECTION, SOLUTION, CONCENTRATE INTRAVENOUS at 01:00

## 2020-03-28 RX ADMIN — ALBUMIN (HUMAN) PRN MLS/HR: 12.5 INJECTION, SOLUTION INTRAVENOUS at 07:46

## 2020-03-28 RX ADMIN — CEFEPIME SCH GM: 2 INJECTION, POWDER, FOR SOLUTION INTRAVENOUS at 20:33

## 2020-03-28 RX ADMIN — DEXTROSE SCH MLS/HR: 50 INJECTION, SOLUTION INTRAVENOUS at 07:38

## 2020-03-28 RX ADMIN — PANTOPRAZOLE SODIUM SCH MG: 40 INJECTION, POWDER, FOR SOLUTION INTRAVENOUS at 07:35

## 2020-03-28 RX ADMIN — CEFEPIME SCH GM: 2 INJECTION, POWDER, FOR SOLUTION INTRAVENOUS at 07:38

## 2020-03-28 NOTE — PDOC
PULMONARY PROGRESS NOTES


Subjective


Patient intubated on 3/23 , sedated


Currently on assist control ventilation rate of 25 450 tidal volume 8 of PEEP 

currently undergoing CRRT


Vitals





Vital Signs








  Date Time  Temp Pulse Resp B/P (MAP) Pulse Ox O2 Delivery O2 Flow Rate FiO2


 


3/28/20 13:00  107 27 97/71 (80) 98 Ventilator  


 


3/28/20 08:00 98.4       





 98.4       








HEENT:  Other (nc at perrl   bipap mask on   neck no lad   no thyromegaly)


Lungs:  Crackles


Cardiovascular:  S1, S2


Abdomen:  Other (distended,  diffuse pain   no mass)


Extremities:  No Edema


Skin:  Warm


Labs





Laboratory Tests








Test


 3/26/20


16:00 3/26/20


23:20 3/27/20


05:50 3/27/20


05:57


 


Magnesium Level


 2.3 mg/dL


(1.8-2.4) 2.3 mg/dL


(1.8-2.4) 2.1 mg/dL


(1.8-2.4) 





 


NT-Pro-B-Type Natriuretic


Peptide 106 pg/mL


(0-124) 


 


 





 


Sodium Level


 


 139 mmol/L


(136-145) 139 mmol/L


(136-145) 





 


Potassium Level


 


 4.1 mmol/L


(3.5-5.1) 4.4 mmol/L


(3.5-5.1) 





 


Chloride Level


 


 104 mmol/L


() 106 mmol/L


() 





 


Carbon Dioxide Level


 


 25 mmol/L


(21-32) 25 mmol/L


(21-32) 





 


Anion Gap  10 (6-14)  8 (6-14)  


 


Blood Urea Nitrogen


 


 48 mg/dL


(7-20) 44 mg/dL


(7-20) 





 


Creatinine


 


 1.9 mg/dL


(0.6-1.0) 1.6 mg/dL


(0.6-1.0) 





 


Estimated GFR


(Cockcroft-Gault) 


 28.1 


 34.3 


 





 


Glucose Level


 


 252 mg/dL


(70-99) 216 mg/dL


(70-99) 





 


Glucose (Fingerstick)


 


 255 mg/dL


(70-99) 


 225 mg/dL


(70-99)


 


Calcium Level


 


 7.6 mg/dL


(8.5-10.1) 7.7 mg/dL


(8.5-10.1) 





 


Phosphorus Level


 


 4.1 mg/dL


(2.6-4.7) 4.3 mg/dL


(2.6-4.7) 





 


White Blood Count


 


 


 34.3 x10^3/uL


(4.0-11.0) 





 


Red Blood Count


 


 


 2.51 x10^6/uL


(3.50-5.40) 





 


Hemoglobin


 


 


 7.8 g/dL


(12.0-15.5) 





 


Hematocrit


 


 


 24.4 %


(36.0-47.0) 





 


Mean Corpuscular Volume   97 fL ()  


 


Mean Corpuscular Hemoglobin   31 pg (25-35)  


 


Mean Corpuscular Hemoglobin


Concent 


 


 32 g/dL


(31-37) 





 


Red Cell Distribution Width


 


 


 15.9 %


(11.5-14.5) 





 


Platelet Count


 


 


 281 x10^3/uL


(140-400) 





 


Neutrophils (%) (Auto)   91 % (31-73)  


 


Lymphocytes (%) (Auto)   4 % (24-48)  


 


Monocytes (%) (Auto)   3 % (0-9)  


 


Eosinophils (%) (Auto)   2 % (0-3)  


 


Basophils (%) (Auto)   0 % (0-3)  


 


Neutrophils # (Auto)


 


 


 31.3 x10^3/uL


(1.8-7.7) 





 


Lymphocytes # (Auto)


 


 


 1.3 x10^3/uL


(1.0-4.8) 





 


Monocytes # (Auto)


 


 


 1.1 x10^3/uL


(0.0-1.1) 





 


Eosinophils # (Auto)


 


 


 0.6 x10^3/uL


(0.0-0.7) 





 


Basophils # (Auto)


 


 


 0.1 x10^3/uL


(0.0-0.2) 





 


BUN/Creatinine Ratio   28 (6-20)  


 


Total Bilirubin


 


 


 0.5 mg/dL


(0.2-1.0) 





 


Aspartate Amino Transf


(AST/SGOT) 


 


 34 U/L (15-37) 


 





 


Alanine Aminotransferase


(ALT/SGPT) 


 


 18 U/L (14-59) 


 





 


Alkaline Phosphatase


 


 


 108 U/L


() 





 


Total Protein


 


 


 4.5 g/dL


(6.4-8.2) 





 


Albumin


 


 


 1.7 g/dL


(3.4-5.0) 





 


Albumin/Globulin Ratio   0.6 (1.0-1.7)  


 


Triglycerides Level


 


 


 240 mg/dL


(0-150) 





 


Test


 3/27/20


08:00 3/27/20


09:15 3/27/20


12:15 3/27/20


17:45


 


O2 Saturation 97 % (92-99)    


 


Arterial Blood pH


 7.33


(7.35-7.45) 


 


 





 


Arterial Blood pCO2 at


Patient Temp 40 mmHg


(35-46) 


 


 





 


Arterial Blood pO2 at Patient


Temp 110 mmHg


() 


 


 





 


Arterial Blood HCO3


 21 mmol/L


(21-28) 


 


 





 


Arterial Blood Base Excess


 -5 mmol/L


(-3-3) 


 


 





 


FiO2 40%    


 


Sodium Level


 


 138 mmol/L


(136-145) 138 mmol/L


(136-145) 139 mmol/L


(136-145)


 


Potassium Level


 


 4.3 mmol/L


(3.5-5.1) 4.4 mmol/L


(3.5-5.1) 4.4 mmol/L


(3.5-5.1)


 


Chloride Level


 


 105 mmol/L


() 104 mmol/L


() 105 mmol/L


()


 


Carbon Dioxide Level


 


 24 mmol/L


(21-32) 24 mmol/L


(21-32) 27 mmol/L


(21-32)


 


Anion Gap  9 (6-14)  10 (6-14)  7 (6-14) 


 


Blood Urea Nitrogen


 


 44 mg/dL


(7-20) 45 mg/dL


(7-20) 41 mg/dL


(7-20)


 


Creatinine


 


 1.7 mg/dL


(0.6-1.0) 1.8 mg/dL


(0.6-1.0) 1.5 mg/dL


(0.6-1.0)


 


Estimated GFR


(Cockcroft-Gault) 


 31.9 


 29.9 


 36.9 





 


Glucose Level


 


 250 mg/dL


(70-99) 261 mg/dL


(70-99) 259 mg/dL


(70-99)


 


Calcium Level


 


 7.8 mg/dL


(8.5-10.1) 7.9 mg/dL


(8.5-10.1) 7.8 mg/dL


(8.5-10.1)


 


Magnesium Level


 


 2.2 mg/dL


(1.8-2.4) 2.1 mg/dL


(1.8-2.4) 2.2 mg/dL


(1.8-2.4)


 


Phosphorus Level


 


 


 3.8 mg/dL


(2.6-4.7) 





 


Test


 3/28/20


00:15 3/28/20


00:36 3/28/20


05:40 3/28/20


05:49


 


Sodium Level


 139 mmol/L


(136-145) 


 139 mmol/L


(136-145) 





 


Potassium Level


 4.4 mmol/L


(3.5-5.1) 


 4.3 mmol/L


(3.5-5.1) 





 


Chloride Level


 106 mmol/L


() 


 105 mmol/L


() 





 


Carbon Dioxide Level


 26 mmol/L


(21-32) 


 26 mmol/L


(21-32) 





 


Anion Gap 7 (6-14)   8 (6-14)  


 


Blood Urea Nitrogen


 43 mg/dL


(7-20) 


 38 mg/dL


(7-20) 





 


Creatinine


 1.5 mg/dL


(0.6-1.0) 


 1.4 mg/dL


(0.6-1.0) 





 


Estimated GFR


(Cockcroft-Gault) 36.9 


 


 40.0 


 





 


Glucose Level


 258 mg/dL


(70-99) 


 210 mg/dL


(70-99) 





 


Calcium Level


 7.9 mg/dL


(8.5-10.1) 


 8.2 mg/dL


(8.5-10.1) 





 


Phosphorus Level


 3.7 mg/dL


(2.6-4.7) 


 3.7 mg/dL


(2.6-4.7) 





 


Magnesium Level


 2.2 mg/dL


(1.8-2.4) 


 2.3 mg/dL


(1.8-2.4) 





 


Glucose (Fingerstick)


 


 251 mg/dL


(70-99) 


 195 mg/dL


(70-99)


 


White Blood Count


 


 


 38.1 x10^3/uL


(4.0-11.0) 





 


Red Blood Count


 


 


 2.38 x10^6/uL


(3.50-5.40) 





 


Hemoglobin


 


 


 7.6 g/dL


(12.0-15.5) 





 


Hematocrit


 


 


 23.4 %


(36.0-47.0) 





 


Mean Corpuscular Volume   98 fL ()  


 


Mean Corpuscular Hemoglobin   32 pg (25-35)  


 


Mean Corpuscular Hemoglobin


Concent 


 


 33 g/dL


(31-37) 





 


Red Cell Distribution Width


 


 


 15.7 %


(11.5-14.5) 





 


Platelet Count


 


 


 308 x10^3/uL


(140-400) 





 


Neutrophils (%) (Auto)   91 % (31-73)  


 


Lymphocytes (%) (Auto)   5 % (24-48)  


 


Monocytes (%) (Auto)   3 % (0-9)  


 


Eosinophils (%) (Auto)   1 % (0-3)  


 


Basophils (%) (Auto)   0 % (0-3)  


 


Neutrophils # (Auto)


 


 


 34.5 x10^3/uL


(1.8-7.7) 





 


Lymphocytes # (Auto)


 


 


 1.9 x10^3/uL


(1.0-4.8) 





 


Monocytes # (Auto)


 


 


 1.2 x10^3/uL


(0.0-1.1) 





 


Eosinophils # (Auto)


 


 


 0.4 x10^3/uL


(0.0-0.7) 





 


Basophils # (Auto)


 


 


 0.1 x10^3/uL


(0.0-0.2) 





 


Test


 3/28/20


08:00 3/28/20


11:30 3/28/20


11:45 





 


O2 Saturation 93 % (92-99)    


 


Arterial Blood pH


 7.34


(7.35-7.45) 


 


 





 


Arterial Blood pCO2 at


Patient Temp 43 mmHg


(35-46) 


 


 





 


Arterial Blood pO2 at Patient


Temp 79 mmHg


() 


 


 





 


Arterial Blood HCO3


 23 mmol/L


(21-28) 


 


 





 


Arterial Blood Base Excess


 -3 mmol/L


(-3-3) 


 


 





 


FiO2 30% vent    


 


Sodium Level


 


 139 mmol/L


(136-145) 


 





 


Potassium Level


 


 4.4 mmol/L


(3.5-5.1) 


 





 


Chloride Level


 


 105 mmol/L


() 


 





 


Carbon Dioxide Level


 


 28 mmol/L


(21-32) 


 





 


Anion Gap  6 (6-14)   


 


Blood Urea Nitrogen


 


 38 mg/dL


(7-20) 


 





 


Creatinine


 


 1.3 mg/dL


(0.6-1.0) 


 





 


Estimated GFR


(Cockcroft-Gault) 


 43.5 


 


 





 


Glucose Level


 


 235 mg/dL


(70-99) 


 





 


Calcium Level


 


 8.1 mg/dL


(8.5-10.1) 


 





 


Phosphorus Level


 


 3.3 mg/dL


(2.6-4.7) 


 





 


Magnesium Level


 


 2.2 mg/dL


(1.8-2.4) 


 





 


Glucose (Fingerstick)


 


 


 231 mg/dL


(70-99) 











Laboratory Tests








Test


 3/27/20


17:45 3/28/20


00:15 3/28/20


00:36 3/28/20


05:40


 


Sodium Level


 139 mmol/L


(136-145) 139 mmol/L


(136-145) 


 139 mmol/L


(136-145)


 


Potassium Level


 4.4 mmol/L


(3.5-5.1) 4.4 mmol/L


(3.5-5.1) 


 4.3 mmol/L


(3.5-5.1)


 


Chloride Level


 105 mmol/L


() 106 mmol/L


() 


 105 mmol/L


()


 


Carbon Dioxide Level


 27 mmol/L


(21-32) 26 mmol/L


(21-32) 


 26 mmol/L


(21-32)


 


Anion Gap 7 (6-14)  7 (6-14)   8 (6-14) 


 


Blood Urea Nitrogen


 41 mg/dL


(7-20) 43 mg/dL


(7-20) 


 38 mg/dL


(7-20)


 


Creatinine


 1.5 mg/dL


(0.6-1.0) 1.5 mg/dL


(0.6-1.0) 


 1.4 mg/dL


(0.6-1.0)


 


Estimated GFR


(Cockcroft-Gault) 36.9 


 36.9 


 


 40.0 





 


Glucose Level


 259 mg/dL


(70-99) 258 mg/dL


(70-99) 


 210 mg/dL


(70-99)


 


Calcium Level


 7.8 mg/dL


(8.5-10.1) 7.9 mg/dL


(8.5-10.1) 


 8.2 mg/dL


(8.5-10.1)


 


Magnesium Level


 2.2 mg/dL


(1.8-2.4) 2.2 mg/dL


(1.8-2.4) 


 2.3 mg/dL


(1.8-2.4)


 


Phosphorus Level


 


 3.7 mg/dL


(2.6-4.7) 


 3.7 mg/dL


(2.6-4.7)


 


Glucose (Fingerstick)


 


 


 251 mg/dL


(70-99) 





 


White Blood Count


 


 


 


 38.1 x10^3/uL


(4.0-11.0)


 


Red Blood Count


 


 


 


 2.38 x10^6/uL


(3.50-5.40)


 


Hemoglobin


 


 


 


 7.6 g/dL


(12.0-15.5)


 


Hematocrit


 


 


 


 23.4 %


(36.0-47.0)


 


Mean Corpuscular Volume    98 fL () 


 


Mean Corpuscular Hemoglobin    32 pg (25-35) 


 


Mean Corpuscular Hemoglobin


Concent 


 


 


 33 g/dL


(31-37)


 


Red Cell Distribution Width


 


 


 


 15.7 %


(11.5-14.5)


 


Platelet Count


 


 


 


 308 x10^3/uL


(140-400)


 


Neutrophils (%) (Auto)    91 % (31-73) 


 


Lymphocytes (%) (Auto)    5 % (24-48) 


 


Monocytes (%) (Auto)    3 % (0-9) 


 


Eosinophils (%) (Auto)    1 % (0-3) 


 


Basophils (%) (Auto)    0 % (0-3) 


 


Neutrophils # (Auto)


 


 


 


 34.5 x10^3/uL


(1.8-7.7)


 


Lymphocytes # (Auto)


 


 


 


 1.9 x10^3/uL


(1.0-4.8)


 


Monocytes # (Auto)


 


 


 


 1.2 x10^3/uL


(0.0-1.1)


 


Eosinophils # (Auto)


 


 


 


 0.4 x10^3/uL


(0.0-0.7)


 


Basophils # (Auto)


 


 


 


 0.1 x10^3/uL


(0.0-0.2)


 


Test


 3/28/20


05:49 3/28/20


08:00 3/28/20


11:30 3/28/20


11:45


 


Glucose (Fingerstick)


 195 mg/dL


(70-99) 


 


 231 mg/dL


(70-99)


 


O2 Saturation  93 % (92-99)   


 


Arterial Blood pH


 


 7.34


(7.35-7.45) 


 





 


Arterial Blood pCO2 at


Patient Temp 


 43 mmHg


(35-46) 


 





 


Arterial Blood pO2 at Patient


Temp 


 79 mmHg


() 


 





 


Arterial Blood HCO3


 


 23 mmol/L


(21-28) 


 





 


Arterial Blood Base Excess


 


 -3 mmol/L


(-3-3) 


 





 


FiO2  30% vent   


 


Sodium Level


 


 


 139 mmol/L


(136-145) 





 


Potassium Level


 


 


 4.4 mmol/L


(3.5-5.1) 





 


Chloride Level


 


 


 105 mmol/L


() 





 


Carbon Dioxide Level


 


 


 28 mmol/L


(21-32) 





 


Anion Gap   6 (6-14)  


 


Blood Urea Nitrogen


 


 


 38 mg/dL


(7-20) 





 


Creatinine


 


 


 1.3 mg/dL


(0.6-1.0) 





 


Estimated GFR


(Cockcroft-Gault) 


 


 43.5 


 





 


Glucose Level


 


 


 235 mg/dL


(70-99) 





 


Calcium Level


 


 


 8.1 mg/dL


(8.5-10.1) 





 


Phosphorus Level


 


 


 3.3 mg/dL


(2.6-4.7) 





 


Magnesium Level


 


 


 2.2 mg/dL


(1.8-2.4) 











Medications





Active Scripts








 Medications  Dose


 Route/Sig


 Max Daily Dose Days Date Category


 


 Bisoprolol


 Fumarate 5 Mg


 Tablet  10 Mg


 PO DAILY


   3/16/20 Reported








Comments


Chest x-ray 3/28 review


Impression: 


1.  Diffuse interstitial and alveolar opacities, slightly decreased within


the upper lungs.


2.  Small bilateral layering pleural effusions, unchanged.





Impression


.


IMPRESSION:


1.  Acute hypoxemic respiratory failure secondary to ARDS due toacute 

pancreatitis, sepsic shock, abdominal distention, and pneumonia.and pleural 

effusions.  Pleural effusions secondary to abdominal process and/or general 

volume overload from renal failure.


2.  Gallstone pancreatitis. WITH NECROSIS


3.  Severe metabolic acidosis.


4.  Acute kidney injury. ON CRRT


5.  Acute gallstone pancreatitis.


6.  Hypoalbuminemia.


7.  Hypocalcemia.


8.  Leukocytosis


9.  Chronic anemia





Plan


.


Overall hemodynamically stable we will continue current support


Cont AC mode , 40 FIO2/ 8 PEEP.  


Poor prognosis/ ARDS/septic shock


supportive care


CRRT


Repeat CT once stable


Antibiotics per ID


Follow surgery input, will rule out Marquis 19 prior to considering surgery


Follow nephrology input


Nutritional support with TPN


Prognosis is extremely poor


d/w RN/











STEVE MIRANDA MD              Mar 28, 2020 13:36

## 2020-03-28 NOTE — PDOC
[facility] [facility] PROGRESS NOTE


Chief Complaint


Chief Complaint


Respiratory failure requiring mechanical ventilation


Severe Acute pancreatitis


Acute kidney failure now requiring dialysis


Salpingo--itis


Gallstones (Calculus of gallbladder with acute cholecystitis without 

obstruction)


HTN 


Leukocytosis 


Hypoxia


Uterine fibroid


Hypoxia with respiratory failure


Intractable pain


Intractable nausea


Possible Covid 19?





History of Present Illness


History of Present Illness


[id number]


Patient seen and examined in the ICU


She is mechanically ventilated


Before meals/25/450/30%


Also on CRRT


Very critically ill


Chart reviewed


Discussed with RN











[id number]


Patient seen and examined in the ICU


She is now been transferred to the Covid 19 unit so we can rule out Covid and 

keep her in isolation


Very critically ill


Intubated and  ventilated


Assist control 40%


Chart reviewed


Discussed with RN


Still on CRRT


Getting a chest x-ray now


Very concerned about her prognosis








[id number]


Patient seen and examined in the ICU


She is extremely critically ill


Remains mechanically ventilated with assist control/25/450/40%


Also on continuous renal replacement therapy


Chart reviewed


Discussed with RN


She has TPN hanging


Also on pressors











[id number]


Patient seen and examined in the ICU


She is still requiring CRRT


On the vent with assist control but her FiO2 is down to 40% from yesterday


Slightly better but still very critically ill


Discussed with RN


Chart reviewed








[id number]


Patient seen and examined in the ICU


She remains extremely critically ill


On IV fentanyl IV Versed IV Doxy


On the vent


Assist-control/25/450/70%


She is critically ill


Discussed with RN


Chart reviewed


Patient is currently on CRRT as well








[id number]


Patient seen and examined in the ICU


She had to be intubated this morning


On assist-control 25/450/100% with 10 of PEEP and only satting 87%


She is extremely critically ill


I'm not sure if she will survive


Chart reviewed


Discussed with RN











Ms Tadeo is a 48yo F w/ PMHx HTN, prediabetes who presents the emergency room

complaints of abdominal pain. Patient described off and on 3 days. She states is

constant, described as a squeezing sensation in a band-like distribution. + 

nausea, vomiting.  She denies any fever or diarrhea.  Patient denies any 

abdominal surgical procedures.  She states is worse with movements, car ride.  

Pain initially was upper abdomen however now pretty much generalized.  Last 

bowel movement was 3/15/2020. Nothing makes her pain better.  Patient denies any

shortness of breath.  She does state the pain moves into her chest.  Denies any 

headache or visual changes.


Lipase 36506, , , Bilirubin 1.4.


CT abdomen confirms pancreatic inflammation, peripancreatic fluid and 

inflammatory changes around the pancreas consistent with pancreatitis. 

Cholelithiasis and 1.4cm uterine fibroid as well as possible left salpingitis. 

Admitted for further care


GI, General surgery, ID, Pulm consulted.





3/17: Overnight per report no urine output. Added dilaudid for pain, PICC placed

per IR. Renal US negative.Seen bedside in ICU, given 2L additional NSS and 

albumin infusion. Still hypotensive, started on levophed. Repeat CT abdomen with

necrosis. Updated her fiancee


3/18: Sats are only 87% on nasal cannula oxygen. Dialysis catheter per 

nephrology


3/19: She is now on BiPAP appears more ill, now on dialysis


3/20: Seen on BiPAP. Her mother and another family member are present and seemed

to be good support for her. Currently on dialysis. Appears critically ill


3/21: Overnight Tmax 101.7 , still on BiPAP FiO2 40%, still on low dose Levophed

gtt, TPN initiated. On dialysis





Overnight still febrile. Dialysis today. She wakes up and responds to pain. No 

CP.





Vitals/I&O


Vitals/I&O:





                                   Vital Signs








  Date Time  Temp Pulse Resp B/P (MAP) Pulse Ox O2 Delivery O2 Flow Rate FiO2


 


3/28/20 13:00  107 27 97/71 (80) 98 Ventilator  


 


3/28/20 08:00 98.4       





 98.4       














                                    I & O   


 


 3/27/20 3/27/20 3/28/20





 15:00 23:00 07:00


 


Intake Total  1100 ml 844 ml


 


Output Total 200 ml 170 ml 420 ml


 


Balance -200 ml 930 ml 424 ml











Physical Exam


Physical Exam:


GENERAL: Orally intubated/sedated - generalizd anasarca 


HEENT: Mild icterus, pupils equal, ETT, NGT


NECK:  Supple. 


LUNGS:  Decreased breath sounds at the bases.


HEART:  S1, S2, regular 


ABDOMEN:  Distended, hypoactive BS, Rectal tube in place 


: [place]   


EXTREMITIES: Generalized edema, no cyanosis - some mottling, [place] boots 

bilaterally 


DERMATOLOGIC:  Warm and dry.  No generalized rash.  


CENTRAL NERVOUS SYSTEM: Sedated 


RIJ Temp HDC, RUE-PICC & LIJ - clean


General:  Other (sedated )


Heart:  Other (increased rate)


Lungs:  Crackles


Abdomen:  Soft, Other (distended, obese, no obvious TTP)


Extremities:  No edema, Other (SOME CLUBBING )


Skin:  Other (mottling noted to extremities )





Labs


Labs:





Laboratory Tests








Test


 3/27/20


17:45 3/28/20


00:15 3/28/20


00:36 3/28/20


05:40


 


Sodium Level


 139 mmol/L


(136-145) 139 mmol/L


(136-145) 


 139 mmol/L


(136-145)


 


Potassium Level


 4.4 mmol/L


(3.5-5.1) 4.4 mmol/L


(3.5-5.1) 


 4.3 mmol/L


(3.5-5.1)


 


Chloride Level


 105 mmol/L


() 106 mmol/L


() 


 105 mmol/L


()


 


Carbon Dioxide Level


 27 mmol/L


(21-32) 26 mmol/L


(21-32) 


 26 mmol/L


(21-32)


 


Anion Gap 7 (6-14)  7 (6-14)   8 (6-14) 


 


Blood Urea Nitrogen


 41 mg/dL


(7-20) 43 mg/dL


(7-20) 


 38 mg/dL


(7-20)


 


Creatinine


 1.5 mg/dL


(0.6-1.0) 1.5 mg/dL


(0.6-1.0) 


 1.4 mg/dL


(0.6-1.0)


 


Estimated GFR


(Cockcroft-Gault) 36.9 


 36.9 


 


 40.0 





 


Glucose Level


 259 mg/dL


(70-99) 258 mg/dL


(70-99) 


 210 mg/dL


(70-99)


 


Calcium Level


 7.8 mg/dL


(8.5-10.1) 7.9 mg/dL


(8.5-10.1) 


 8.2 mg/dL


(8.5-10.1)


 


Magnesium Level


 2.2 mg/dL


(1.8-2.4) 2.2 mg/dL


(1.8-2.4) 


 2.3 mg/dL


(1.8-2.4)


 


Phosphorus Level


 


 3.7 mg/dL


(2.6-4.7) 


 3.7 mg/dL


(2.6-4.7)


 


Glucose (Fingerstick)


 


 


 251 mg/dL


(70-99) 





 


White Blood Count


 


 


 


 38.1 x10^3/uL


(4.0-11.0)


 


Red Blood Count


 


 


 


 2.38 x10^6/uL


(3.50-5.40)


 


Hemoglobin


 


 


 


 7.6 g/dL


(12.0-15.5)


 


Hematocrit


 


 


 


 23.4 %


(36.0-47.0)


 


Mean Corpuscular Volume    98 fL () 


 


Mean Corpuscular Hemoglobin    32 pg (25-35) 


 


Mean Corpuscular Hemoglobin


Concent 


 


 


 33 g/dL


(31-37)


 


Red Cell Distribution Width


 


 


 


 15.7 %


(11.5-14.5)


 


Platelet Count


 


 


 


 308 x10^3/uL


(140-400)


 


Neutrophils (%) (Auto)    91 % (31-73) 


 


Lymphocytes (%) (Auto)    5 % (24-48) 


 


Monocytes (%) (Auto)    3 % (0-9) 


 


Eosinophils (%) (Auto)    1 % (0-3) 


 


Basophils (%) (Auto)    0 % (0-3) 


 


Neutrophils # (Auto)


 


 


 


 34.5 x10^3/uL


(1.8-7.7)


 


Lymphocytes # (Auto)


 


 


 


 1.9 x10^3/uL


(1.0-4.8)


 


Monocytes # (Auto)


 


 


 


 1.2 x10^3/uL


(0.0-1.1)


 


Eosinophils # (Auto)


 


 


 


 0.4 x10^3/uL


(0.0-0.7)


 


Basophils # (Auto)


 


 


 


 0.1 x10^3/uL


(0.0-0.2)


 


Test


 3/28/20


05:49 3/28/20


08:00 3/28/20


11:30 3/28/20


11:45


 


Glucose (Fingerstick)


 195 mg/dL


(70-99) 


 


 231 mg/dL


(70-99)


 


O2 Saturation  93 % (92-99)   


 


Arterial Blood pH


 


 7.34


(7.35-7.45) 


 





 


Arterial Blood pCO2 at


Patient Temp 


 43 mmHg


(35-46) 


 





 


Arterial Blood pO2 at Patient


Temp 


 79 mmHg


() 


 





 


Arterial Blood HCO3


 


 23 mmol/L


(21-28) 


 





 


Arterial Blood Base Excess


 


 -3 mmol/L


(-3-3) 


 





 


FiO2  30% vent   


 


Sodium Level


 


 


 139 mmol/L


(136-145) 





 


Potassium Level


 


 


 4.4 mmol/L


(3.5-5.1) 





 


Chloride Level


 


 


 105 mmol/L


() 





 


Carbon Dioxide Level


 


 


 28 mmol/L


(21-32) 





 


Anion Gap   6 (6-14)  


 


Blood Urea Nitrogen


 


 


 38 mg/dL


(7-20) 





 


Creatinine


 


 


 1.3 mg/dL


(0.6-1.0) 





 


Estimated GFR


(Cockcroft-Gault) 


 


 43.5 


 





 


Glucose Level


 


 


 235 mg/dL


(70-99) 





 


Calcium Level


 


 


 8.1 mg/dL


(8.5-10.1) 





 


Phosphorus Level


 


 


 3.3 mg/dL


(2.6-4.7) 





 


Magnesium Level


 


 


 2.2 mg/dL


(1.8-2.4) 














Assessment and Plan


Assessmemt and Plan


Problems


Medical Problems:


(1) Acute pancreatitis


Status: Acute  





(2) Cholelithiasis


Status: Acute  





Respiratory failure requiring intubation


Severe Acute pancreatitis


Acute kidney failure now requiring dialysis


Salpingo--itis


Gallstones (Calculus of gallbladder with acute cholecystitis without 

obstruction)


HTN 


Leukocytosis 


Hypoxia


Uterine fibroid


Hypoxia with respiratory failure


Intractable pain


Intractable nausea











Plan


Checking Covid 19 await results


CRRT


Mechanical ventilation


Hemodialysis


BiPAP


ICU monitoring


Trend labs especially white count and lipase levels


We have consulted GI and general surgery


Appreciate pulmonary assistance as well


IV antibiotics


IV fluids


KS narcotics


When necessary anti-medics


Home meds if possible


DVT prophylaxis


Full code


She is critically ill


Total time 32 minutes





Comment


Review of Relevant


I have reviewed the following items josy (where applicable) has been applied.


Medications:





Current Medications








 Medications


  (Trade)  Dose


 Ordered  Sig/Yee


 Route


 PRN Reason  Start Time


 Stop Time Status Last Admin


Dose Admin


 


 Sodium Chloride


 90 meq/Potassium


 Chloride 15 meq/


 Potassium


 Phosphate 18 mmol/


 Magnesium Sulfate


 8 meq/Calcium


 Gluconate 15 meq/


 Multivitamins 10


 ml/Chromium/


 Copper/Manganese/


 Seleni/Zn 0.5 ml/


 Insulin Human


 Regular 10 unit/


 Total Parenteral


 Nutrition/Amino


 Acids/Dextrose/


 Fat Emulsion


 Intravenous  1,400 ml @ 


 58.333 mls/


 hr  TPN  CONT


 IV


   3/27/20 22:00


 3/28/20 21:59  3/27/20 21:43














Hemodynamically unstable?:  Yes


Is patient in severe pain?:  Yes


Is NPO status required?:  Yes











HECTOR MASON III DO           Mar 28, 2020 13:54

## 2020-03-28 NOTE — NUR
Pharmacy TPN Dosing Note



S: JESENIA RICH is a 49 year old F Currently receiving Central Continuous TPN started 
03/18/20



B:Pertinent PMH: 

Necrotizing pancreatitis

Height: 5 feet, 8 inches

Weight: 109.5 kg



Current diet: NPO 



LABS:

Sodium:    139 

Potassium: 4.4 

Chloride:  105 

Calcium:   7.8 

Corrected Calcium: 9.48 

Magnesium: 2.2 

CO2:       24 

SCr:       1.3 

Glucose:   231-235 

Albumin:   1.9 

AST:       37 

ALT:       16 



TPN FORMULA:

TPN TYPE:  Central Continuous

AMINO ACIDS:         125 gm

DEXTROSE:            195 gm

LIPIDS:              40 gm

SODIUM CHLORIDE:     90 mEq

SODIUM ACETATE:      -- mEq

SODIUM PHOSPHATE:    -- mmol

POTASSIUM CHLORIDE:  15 mEq

POTASSIUM ACETATE:   -- mEq

POTASSIUM PHOSPHATE: 18 mmol

MAGNESIUM:           8 mEq

CALCIUM:             15 mEq

INSULIN:             15 units

MULTIPLE VITAMIN:    10 ml

TRACE ELEMENTS:      0.5 ml(s)



TPN PLAN:  

Labs WNL; BG still high, increase insulin in TPN from 10 to 15 units.

Labs per nephrology for CRRT monitoring.





R: Continue TPN AS ABOVE.

Will monitor electrolytes, glucose, and tolerance to TPN.



 CANDACE BELTRE formerly [facility], 03/28/20 1014

## 2020-03-28 NOTE — PDOC
Infectious Disease Note


Subjective


Subjective


Intubated FiO2 down to 30% PEEP 8


CRRT


No fevers last 24 hrs





ROS


ROS


unobtainable





Vital Sign


Vital Signs





Vital Signs








  Date Time  Temp Pulse Resp B/P (MAP) Pulse Ox O2 Delivery O2 Flow Rate FiO2


 


3/28/20 08:06     98 Ventilator  


 


3/28/20 08:00 98.4 107 26 113/85 (94)    





 98.4       











Physical Exam


PHYSICAL EXAM


GENERAL: Orally intubated/sedated - generalizd anasarca 


HEENT: Mild icterus, pupils equal, ETT, NGT


NECK:  Supple. 


LUNGS:  Decreased breath sounds at the bases.


HEART:  S1, S2, regular 


ABDOMEN:  Distended, hypoactive BS, Rectal tube in place 


: [place]   


EXTREMITIES: Generalized edema, no cyanosis - some mottling, [place] boots 

bilaterally 


DERMATOLOGIC:  Warm and dry.  No generalized rash.  


CENTRAL NERVOUS SYSTEM: Sedated 


RIJ Temp HDC, RUE-PICC & LIJ - clean





Labs


Lab





Laboratory Tests








Test


 3/27/20


12:15 3/27/20


17:45 3/28/20


00:15 3/28/20


00:36


 


Sodium Level


 138 mmol/L


(136-145) 139 mmol/L


(136-145) 139 mmol/L


(136-145) 





 


Potassium Level


 4.4 mmol/L


(3.5-5.1) 4.4 mmol/L


(3.5-5.1) 4.4 mmol/L


(3.5-5.1) 





 


Chloride Level


 104 mmol/L


() 105 mmol/L


() 106 mmol/L


() 





 


Carbon Dioxide Level


 24 mmol/L


(21-32) 27 mmol/L


(21-32) 26 mmol/L


(21-32) 





 


Anion Gap 10 (6-14)  7 (6-14)  7 (6-14)  


 


Blood Urea Nitrogen


 45 mg/dL


(7-20) 41 mg/dL


(7-20) 43 mg/dL


(7-20) 





 


Creatinine


 1.8 mg/dL


(0.6-1.0) 1.5 mg/dL


(0.6-1.0) 1.5 mg/dL


(0.6-1.0) 





 


Estimated GFR


(Cockcroft-Gault) 29.9 


 36.9 


 36.9 


 





 


Glucose Level


 261 mg/dL


(70-99) 259 mg/dL


(70-99) 258 mg/dL


(70-99) 





 


Calcium Level


 7.9 mg/dL


(8.5-10.1) 7.8 mg/dL


(8.5-10.1) 7.9 mg/dL


(8.5-10.1) 





 


Phosphorus Level


 3.8 mg/dL


(2.6-4.7) 


 3.7 mg/dL


(2.6-4.7) 





 


Magnesium Level


 2.1 mg/dL


(1.8-2.4) 2.2 mg/dL


(1.8-2.4) 2.2 mg/dL


(1.8-2.4) 





 


Glucose (Fingerstick)


 


 


 


 251 mg/dL


(70-99)


 


Test


 3/28/20


05:40 3/28/20


05:49 


 





 


White Blood Count


 38.1 x10^3/uL


(4.0-11.0) 


 


 





 


Red Blood Count


 2.38 x10^6/uL


(3.50-5.40) 


 


 





 


Hemoglobin


 7.6 g/dL


(12.0-15.5) 


 


 





 


Hematocrit


 23.4 %


(36.0-47.0) 


 


 





 


Mean Corpuscular Volume 98 fL ()    


 


Mean Corpuscular Hemoglobin 32 pg (25-35)    


 


Mean Corpuscular Hemoglobin


Concent 33 g/dL


(31-37) 


 


 





 


Red Cell Distribution Width


 15.7 %


(11.5-14.5) 


 


 





 


Platelet Count


 308 x10^3/uL


(140-400) 


 


 





 


Neutrophils (%) (Auto) 91 % (31-73)    


 


Lymphocytes (%) (Auto) 5 % (24-48)    


 


Monocytes (%) (Auto) 3 % (0-9)    


 


Eosinophils (%) (Auto) 1 % (0-3)    


 


Basophils (%) (Auto) 0 % (0-3)    


 


Neutrophils # (Auto)


 34.5 x10^3/uL


(1.8-7.7) 


 


 





 


Lymphocytes # (Auto)


 1.9 x10^3/uL


(1.0-4.8) 


 


 





 


Monocytes # (Auto)


 1.2 x10^3/uL


(0.0-1.1) 


 


 





 


Eosinophils # (Auto)


 0.4 x10^3/uL


(0.0-0.7) 


 


 





 


Basophils # (Auto)


 0.1 x10^3/uL


(0.0-0.2) 


 


 





 


Sodium Level


 139 mmol/L


(136-145) 


 


 





 


Potassium Level


 4.3 mmol/L


(3.5-5.1) 


 


 





 


Chloride Level


 105 mmol/L


() 


 


 





 


Carbon Dioxide Level


 26 mmol/L


(21-32) 


 


 





 


Anion Gap 8 (6-14)    


 


Blood Urea Nitrogen


 38 mg/dL


(7-20) 


 


 





 


Creatinine


 1.4 mg/dL


(0.6-1.0) 


 


 





 


Estimated GFR


(Cockcroft-Gault) 40.0 


 


 


 





 


Glucose Level


 210 mg/dL


(70-99) 


 


 





 


Calcium Level


 8.2 mg/dL


(8.5-10.1) 


 


 





 


Phosphorus Level


 3.7 mg/dL


(2.6-4.7) 


 


 





 


Magnesium Level


 2.3 mg/dL


(1.8-2.4) 


 


 





 


Glucose (Fingerstick)


 


 195 mg/dL


(70-99) 


 











CXR, 3/28


Findings:


Bilateral interstitial and alveolar opacities, slightly decreased within 


the upper lobes. Small bilateral layering pleural effusions, unchanged. 


Low lung volumes. Unchanged heart size. Stable endotracheal tube, enteric 


tube, right IJ central line, left-sided central line and right PICC


 


Impression: 


1.  Diffuse interstitial and alveolar opacities, slightly decreased within


the upper lungs.


2.  Small bilateral layering pleural effusions, unchanged.


Micro





Microbiology


3/18/20 Blood Culture - Preliminary, Resulted


          NO GROWTH AFTER 3 DAYS





Objective


Assessment


Leukocytosis - increased - ? reactive - trouble with CRRT/S/p PRBCs ? intra-

abdominal 


Fever - better - Flu neg antigen 3/26 ? reactive with pancreatitis vs ID - C-

diff neg. S/p HD cath change with malfunction 3/25 - cults 3/24 neg


Lung opacities, COVID-19 pending 


Hypotension 


Acute pancreatitis, early developing necrosis -U/S 3/26 reviewed


JED,Hyperkalemia, Metabolic acidosis on CRRT


   - s/p RIJ temporary dialysis catheter replacement, 3/25


Anasarca - worse


Acute hypoxic resp failure, intubated


? developing peripheral ischemia - better


Cholelithiasis


Anemia - S/p PRBC 3/26


Hypocalcemia 


Prediabetes


HTN





Plan


Plan of Care


Continue Dapto/cefepime (3/25), flagyle and micafungin (3/23) 


   -Previously on Merrem, Zyvox 


F/u Blood cults 3/24 so far neg


CT ordered abd/pelvis but on CRRT will need when stable


No surgical plans at this time


Maintain aspiration precautions 


Airborne isolation till COVID-19 r/o 





D/w nursing





Critically ill


Attending Co-Sign


The patient was seen and interviewed as well as examined at the bedside. The 

chart was reviewed. The case was discussed. Agree with the plan of care.











HAVEN WINTERS        Mar 28, 2020 09:30


LINN FRANZ MD               Mar 28, 2020 12:01

## 2020-03-28 NOTE — NUR
TPM pressures on CRRT machine elevated to 320 and machine alarmed. Flushed system x200 cc NS 
but still unable to run; attempted second flush of 100 cc and continued to alarm. Leda CALL 
on call dialysis notified.

## 2020-03-28 NOTE — NUR
Patient's TMP started increasing ~2200, by 2300 CRRT machine alarming "High" TMP and TMP is 
400's. Attempted to flush line but TMP continues to rise; called Ana, Dialysis RN and 
notified of increased TMP even after flushing line--Ana states she will be in to restring 
CRRT. AT midnight, CRRT machine continues to alarm "High" TMP and also alarming "Filter 
clotting. Blood returned to patient and CRRT stopped until Dialysis RN can restart. CRRT 
restarted at 0125 without difficulty by Ana, Dialysis RN. Will continue to monitor, flush 
line Q4-6HRS PRN, and notify Dialysis if TMP again rises

## 2020-03-28 NOTE — RAD
CHEST AP ONLY

 

History: Ventilated patient. Respiratory failure.

 

Comparison: March 27, 2020

 

Findings:

Bilateral interstitial and alveolar opacities, slightly decreased within 

the upper lobes. Small bilateral layering pleural effusions, unchanged. 

Low lung volumes. Unchanged heart size. Stable endotracheal tube, enteric 

tube, right IJ central line, left-sided central line and right PICC

 

Impression: 

1.  Diffuse interstitial and alveolar opacities, slightly decreased within

the upper lungs.

2.  Small bilateral layering pleural effusions, unchanged.

 

Electronically signed by: Torrey Coyle DO (3/28/2020 8:13 AM) [id number]

## 2020-03-28 NOTE — PDOC
SUBJECTIVE


ROS


Intubated





OBJECTIVE


Vital Signs





Vital Signs








  Date Time  Temp Pulse Resp B/P (MAP) Pulse Ox O2 Delivery O2 Flow Rate FiO2


 


3/28/20 08:06     98 Ventilator  


 


3/28/20 08:00 98.4 107 26 113/85 (94)    





 98.4       








I & 0











Intake and Output 


 


 3/28/20





 07:00


 


Intake Total 1944 ml


 


Output Total 790 ml


 


Balance 1154 ml


 


 


 


IV Total 1944 ml


 


Output Urine Total 540 ml


 


Gastric Drainage Total 250 ml











PHYSICAL EXAM


Physical Exam


GENERAL: Intubated on MV 


HEENT: Mild icterus, Intubated  


NECK:  Supple. 


LUNGS:  Decreased breath sounds at the bases.


HEART:  S1, S2, tachycardia, 110s,


ABDOMEN:  Distended, + BS. Rectal tube in place


: [place]  +


EXTREMITIES: Trace edema, no cyanosis - mottled.


DERMATOLOGIC:  Warm and dry.  No generalized rash.  


CENTRAL NERVOUS SYSTEM: Intubated  on MV





DIAGNOSIS/ASSESSMENT


Assessment & Plan


JED - ATN, , requiring  RRT, some improvement in uop  


 intubated on 3/23 , currently on  pressors


initially on HD , Switched to CRRT 3/23 seen on CRRT, tolerating well (Temp HDC 

replaced today 2/2 nonfunctional ) 


Tolerating /ml /hr net  


Discussed with RN and Robert  





HyperKalemia- normal  





 Acidosis -  Bicarb Normal- on higher side ,  Dced  IV bicarb  on 3/23


Currently on  TPN with bicarb  





HypoCalcemia- Corrected Ca normal  





Hypoalbuminemia- severe  





HypOPhos -Replace as needed  





 Acute pancreatitis - with necrosis/infection, likely gallstone pancreatitis. 

GI, ID, and general surgery  following  





Calculus of gallbladder with acute cholecystitis without obstruction - with 

secondary pancreatitis. Lap naif is indicated, will need to await pancreatitis 

resolution





HTN - Hypotensive, on pressors  





Sepsis - with acute pancreatitis as end organ dysfunction, sign of infection, 

zyvox, merrem





Hypoxia with respiratory failure - intubated





COMMENT/RELEVANT DATA


Meds





Current Medications








 Medications


  (Trade)  Dose


 Ordered  Sig/Yee  Start Time


 Stop Time Status Last Admin


Dose Admin


 


 Acetaminophen


  (Tylenol Supp)  650 mg  PRN Q6HRS  PRN  3/24/20 10:30


    3/24/20 10:37


650 MG


 


 Acetaminophen


  (Tylenol)  650 mg  PRN Q6HRS  PRN  3/21/20 03:36


    3/26/20 07:35


650 MG


 


 Albumin Human  500 ml @ 


 125 mls/hr  1X  ONCE  3/26/20 14:15


 3/26/20 18:14 DC  





 


 Albuterol Sulfate


  (Ventolin Neb


 Soln)  2.5 mg  1X  ONCE  3/17/20 22:30


 3/17/20 22:31 DC 3/18/20 00:56


2.5 MG


 


 Artificial Tears


  (Artificial


 Tears)  1 drop  PRN Q1HR  PRN  3/23/20 08:15


     





 


 Atenolol


  (Tenormin)  100 mg  DAILY  3/17/20 09:00


 3/16/20 20:08 DC  





 


 Benzocaine


  (Hurricaine One)  1 spray  1X  ONCE  3/20/20 14:30


 3/20/20 14:31 DC 3/20/20 16:38


1 SPRAY


 


 Calcium Carbonate/


 Glycine


  (Tums)  500 mg  PRN [id number]  PRN  3/18/20 17:45


     





 


 Calcium Chloride


 1000 mg/Sodium


 Chloride  110 ml @ 


 220 mls/hr  1X  ONCE  3/17/20 22:30


 3/17/20 22:59 DC 3/17/20 22:11


220 MLS/HR


 


 Calcium Chloride


 3000 mg/Sodium


 Chloride  1,030 ml @ 


 50 mls/hr  [id number]  3/19/20 08:00


 3/21/20 15:23 DC 3/21/20 02:17


50 MLS/HR


 


 Calcium Gluconate


  (Calcium


 Gluconate)  2,000 mg  1X  ONCE  3/19/20 02:15


 3/19/20 02:16 DC 3/19/20 02:19


2,000 MG


 


 Calcium Gluconate


 1000 mg/Sodium


 Chloride  110 ml @ 


 220 mls/hr  1X  ONCE  3/18/20 03:30


 3/18/20 03:59 DC 3/18/20 03:21


220 MLS/HR


 


 Calcium Gluconate


 2000 mg/Sodium


 Chloride  120 ml @ 


 220 mls/hr  1X  ONCE  3/18/20 07:30


 3/18/20 08:02 DC 3/18/20 09:05


220 MLS/HR


 


 Cefepime HCl


  (Maxipime)  2 gm  Q12HR  3/25/20 09:00


    3/28/20 07:38


2 GM


 


 Daptomycin 500 mg/


 Sodium Chloride  50 ml @ 


 100 mls/hr  Q48H  3/25/20 08:30


    3/27/20 09:45


100 MLS/HR


 


 Dextrose


  (Dextrose


 50%-Water Syringe)  12.5 gm  PRN Q15MIN  PRN  3/16/20 09:30


     





 


 Digoxin


  (Lanoxin)  125 mcg  1X  ONCE  3/19/20 18:00


 3/19/20 18:01 DC 3/19/20 17:10


125 MCG


 


 Diphenhydramine


 HCl


  (Benadryl)  25 mg  1X PRN  PRN  3/23/20 07:30


 3/24/20 07:29 DC  





 


 Etomidate


  (Amidate)  8 mg  1X  ONCE  3/23/20 08:30


 3/23/20 08:31 DC 3/23/20 08:33


8 MG


 


 Fentanyl Citrate  30 ml @ 0


 mls/hr  CONT  PRN  3/23/20 08:15


    3/28/20 08:45


2.5 MLS/HR


 


 Fentanyl Citrate


  (Fentanyl 2ml


 Vial)  100 mcg  STK-MED ONCE  3/16/20 03:18


 3/16/20 03:18 DC  





 


 Furosemide


  (Lasix)  40 mg  1X  ONCE  3/17/20 22:30


 3/17/20 22:31 DC 3/17/20 22:12


40 MG


 


 Heparin Sodium


  (Porcine)


  (Heparin Sodium)  5,000 unit  Q8HRS  3/23/20 15:00


    3/28/20 05:55


5,000 UNIT


 


 Hydromorphone HCl


  (Dilaudid)  1 mg  PRN Q3HRS  PRN  3/17/20 12:00


    3/23/20 05:13


1 MG


 


 Info


  (CONTRAST GIVEN


 -- Rx MONITORING)  1 each  PRN DAILY  PRN  3/17/20 17:00


 3/19/20 16:59 DC  





 


 Info


  (PHARMACY


 MONITORING -- do


 not chart)  1 each  PRN DAILY  PRN  3/23/20 07:30


     





 


 Info


  (Tpn Per


 Pharmacy)  1 each  PRN DAILY  PRN  3/18/20 12:30


   UNV  





 


 Insulin Human


 Lispro


  (HumaLOG)  0-9 UNITS  Q6HRS  3/16/20 09:30


    3/28/20 11:48


5 UNITS


 


 Insulin Human


 Regular


  (HumuLIN R VIAL)  5 unit  1X  ONCE  3/17/20 22:30


 3/17/20 22:31 DC 3/17/20 22:14


5 UNIT


 


 Iohexol


  (Omnipaque 300


 Mg/ml)  60 ml  1X  ONCE  3/17/20 17:00


 3/17/20 17:01 DC 3/17/20 17:20


60 ML


 


 Iohexol


  (Omnipaque 350


 Mg/ml)  90 ml  1X  ONCE  3/16/20 03:30


 3/16/20 03:31 DC 3/16/20 03:25


90 ML


 


 Ketorolac


 Tromethamine


  (Toradol 30mg


 Vial)  30 mg  1X  ONCE  3/16/20 03:00


 3/16/20 03:01 DC 3/16/20 02:54


30 MG


 


 Lidocaine HCl


  (Buffered


 Lidocaine 1%)  3 ml  STK-MED ONCE  3/25/20 10:00


 3/27/20 13:57 DC  





 


 Lidocaine HCl


  (Glydo


  (Lidocaine) Jelly)  1 thomas  1X  ONCE  3/20/20 14:30


 3/20/20 14:31 DC 3/20/20 16:38


1 THOMAS


 


 Lidocaine HCl


  (Xylocaine-Mpf


 1% 2ml Vial)  2 ml  STK-MED ONCE  3/18/20 08:47


 3/18/20 08:47 DC  





 


 Linezolid/Dextrose  300 ml @ 


 300 mls/hr  Q12HR  3/20/20 20:00


 3/27/20 07:50 DC 3/26/20 21:04


300 MLS/HR


 


 Lorazepam


  (Ativan Inj)  1 mg  PRN Q4HRS  PRN  3/19/20 09:00


    3/23/20 00:34


1 MG


 


 Magnesium Sulfate  100 ml @ 


 25 mls/hr  1X  ONCE  3/19/20 13:00


 3/19/20 16:59 DC 3/19/20 12:48


25 MLS/HR


 


 Meropenem 1 gm/


 Sodium Chloride  100 ml @ 


 200 mls/hr  Q8HRS  3/17/20 20:00


 3/18/20 08:48 DC 3/18/20 05:45


200 MLS/HR


 


 Meropenem 500 mg/


 Sodium Chloride  50 ml @ 


 100 mls/hr  Q6HRS  3/24/20 09:00


 3/25/20 07:29 DC 3/25/20 06:00


100 MLS/HR


 


 Metoprolol


 Tartrate


  (Lopressor Vial)  5 mg  Q6HRS  3/17/20 10:15


 3/28/20 08:48 DC 3/26/20 00:12


5 MG


 


 Metronidazole  100 ml @ 


 100 mls/hr  Q6HRS  3/23/20 08:30


    3/28/20 11:55


100 MLS/HR


 


 Micafungin Sodium


 100 mg/Dextrose  100 ml @ 


 100 mls/hr  Q24H  3/23/20 09:00


    3/28/20 07:38


100 MLS/HR


 


 Midazolam HCl


  (Versed)  5 mg  1X  ONCE  3/23/20 08:30


 3/23/20 08:31 DC  





 


 Midazolam HCl 50


 mg/Sodium Chloride  50 ml @ 0


 mls/hr  CONT  PRN  3/23/20 08:15


    3/26/20 22:39


7 MLS/HR


 


 Morphine Sulfate


  (Morphine


 Sulfate)  2 mg  PRN Q2HR  PRN  3/16/20 05:00


 3/17/20 14:15 DC 3/17/20 12:26


2 MG


 


 Multi-Ingred


 Cream/Lotion/Oil/


 Oint


  (Artificial


 Tears Eye


 Ointment)  1 thomas  PRN Q1HR  PRN  3/25/20 17:30


     





 


 Norepinephrine


 Bitartrate 8 mg/


 Dextrose  258 ml @ 


 17.299 mls/


 hr  CONT  PRN  3/17/20 15:30


    3/28/20 10:11


18.7 MLS/HR


 


 Ondansetron HCl


  (Zofran)  4 mg  PRN Q4HRS  PRN  3/16/20 09:30


    3/21/20 16:15


4 MG


 


 Pantoprazole


 Sodium


  (PROTONIX VIAL


 for IV PUSH)  40 mg  DAILYAC  3/16/20 11:30


    3/28/20 07:35


40 MG


 


 Piperacillin Sod/


 Tazobactam Sod


 4.5 gm/Sodium


 Chloride  100 ml @ 


 200 mls/hr  1X  ONCE  3/16/20 06:00


 3/16/20 06:29 DC 3/16/20 05:44


200 MLS/HR


 


 Potassium


 Chloride 15 meq/


 Bicarbonate


 Dialysis Soln w/


 out KCl  5,007.5 ml


  @ 1,000 mls/


 hr  Q5H1M  3/23/20 12:00


 3/24/20 11:19 DC 3/24/20 11:11


1,000 MLS/HR


 


 Potassium


 Chloride 20 meq/


 Bicarbonate


 Dialysis Soln w/


 out KCl  5,010 ml @ 


 1,000 mls/hr  Q5H1M  3/25/20 16:00


    3/28/20 11:54


1,000 MLS/HR


 


 Potassium


 Chloride/Water  100 ml @ 


 100 mls/hr  Q1H  3/24/20 11:00


 3/24/20 12:59 DC 3/24/20 12:12


100 MLS/HR


 


 Potassium


 Phosphate 20 mmol/


 Sodium Chloride  106.6667


 ml @ 


 51.667 m...  1X  ONCE  3/25/20 13:00


 3/25/20 15:03 DC 3/25/20 12:51


51.667 MLS/HR


 


 Prochlorperazine


 Edisylate


  (Compazine)  10 mg  PRN Q6HRS  PRN  3/16/20 17:45


    3/17/20 00:42


10 MG


 


 Propofol  0 ml @ As


 Directed  STK-MED ONCE  3/23/20 07:53


 3/23/20 07:53 DC  





 


 Sodium


 Bicarbonate 50


 meq/Sodium


 Chloride  1,050 ml @ 


 75 mls/hr  Q14H  3/18/20 07:30


 3/23/20 10:28 DC 3/22/20 21:10


75 MLS/HR


 


 Sodium Chloride


  (Normal Saline


 Flush)  10 ml  1X PRN  PRN  3/21/20 08:00


 3/22/20 07:59 DC  





 


 Sodium Chloride


 90 meq/Calcium


 Gluconate 10 meq/


 Multivitamins 10


 ml/Chromium/


 Copper/Manganese/


 Seleni/Zn 0.5 ml/


 Total Parenteral


 Nutrition/Amino


 Acids/Dextrose/


 Fat Emulsion


 Intravenous  1,512 ml @ 


 63 mls/hr  TPN  CONT  3/18/20 22:00


 3/19/20 21:59 DC 3/18/20 22:06


63 MLS/HR


 


 Sodium Chloride


 90 meq/Calcium


 Gluconate 10 meq/


 Multivitamins 10


 ml/Chromium/


 Copper/Manganese/


 Seleni/Zn 1 ml/


 Total Parenteral


 Nutrition/Amino


 Acids/Dextrose/


 Fat Emulsion


 Intravenous  55.005 ml


  @ 2.292


 mls/hr  TPN  CONT  3/18/20 22:00


 3/18/20 12:33 DC  





 


 Sodium Chloride


 90 meq/Magnesium


 Sulfate 10 meq/


 Calcium Gluconate


 20 meq/


 Multivitamins 10


 ml/Chromium/


 Copper/Manganese/


 Seleni/Zn 0.5 ml/


 Total Parenteral


 Nutrition/Amino


 Acids/Dextrose/


 Fat Emulsion


 Intravenous  1,512 ml @ 


 63 mls/hr  TPN  CONT  3/19/20 22:00


 3/20/20 21:59 DC 3/19/20 22:25


63 MLS/HR


 


 Sodium Chloride


 90 meq/Potassium


 Chloride 15 meq/


 Potassium


 Phosphate 10 mmol/


 Magnesium Sulfate


 10 meq/Calcium


 Gluconate 20 meq/


 Multivitamins 10


 ml/Chromium/


 Copper/Manganese/


 Seleni/Zn 0.5 ml/


 Total Parenteral


 Nutrition/Amino


 Acids/Dextrose/


 Fat Emulsion


 Intravenous  1,400 ml @ 


 58.333 mls/


 hr  TPN  CONT  3/23/20 22:00


 3/24/20 21:59 DC 3/23/20 21:42


58.333 MLS/HR


 


 Sodium Chloride


 90 meq/Potassium


 Chloride 15 meq/


 Potassium


 Phosphate 15 mmol/


 Magnesium Sulfate


 10 meq/Calcium


 Gluconate 15 meq/


 Multivitamins 10


 ml/Chromium/


 Copper/Manganese/


 Seleni/Zn 0.5 ml/


 Total Parenteral


 Nutrition/Amino


 Acids/Dextrose/


 Fat Emulsion


 Intravenous  1,400 ml @ 


 58.333 mls/


 hr  TPN  CONT  3/24/20 22:00


 3/25/20 21:59 DC 3/24/20 22:17


58.333 MLS/HR


 


 Sodium Chloride


 90 meq/Potassium


 Chloride 15 meq/


 Potassium


 Phosphate 15 mmol/


 Magnesium Sulfate


 10 meq/Calcium


 Gluconate 20 meq/


 Multivitamins 10


 ml/Chromium/


 Copper/Manganese/


 Seleni/Zn 0.5 ml/


 Total Parenteral


 Nutrition/Amino


 Acids/Dextrose/


 Fat Emulsion


 Intravenous  1,200 ml @ 


 50 mls/hr  TPN  CONT  3/22/20 22:00


 3/22/20 14:17 DC  





 


 Sodium Chloride


 90 meq/Potassium


 Chloride 15 meq/


 Potassium


 Phosphate 18 mmol/


 Magnesium Sulfate


 8 meq/Calcium


 Gluconate 15 meq/


 Multivitamins 10


 ml/Chromium/


 Copper/Manganese/


 Seleni/Zn 0.5 ml/


 Insulin Human


 Regular 10 unit/


 Total Parenteral


 Nutrition/Amino


 Acids/Dextrose/


 Fat Emulsion


 Intravenous  1,400 ml @ 


 58.333 mls/


 hr  TPN  CONT  3/27/20 22:00


 3/28/20 21:59  3/27/20 21:43


58.333 MLS/HR


 


 Sodium Chloride


 90 meq/Potassium


 Chloride 15 meq/


 Potassium


 Phosphate 18 mmol/


 Magnesium Sulfate


 8 meq/Calcium


 Gluconate 15 meq/


 Multivitamins 10


 ml/Chromium/


 Copper/Manganese/


 Seleni/Zn 0.5 ml/


 Total Parenteral


 Nutrition/Amino


 Acids/Dextrose/


 Fat Emulsion


 Intravenous  1,400 ml @ 


 58.333 mls/


 hr  TPN  CONT  3/26/20 22:00


 3/27/20 21:59 DC 3/26/20 22:00


58.333 MLS/HR


 


 Succinylcholine


 Chloride


  (Anectine)  120 mg  1X  ONCE  3/23/20 08:30


 3/23/20 08:31 DC 3/23/20 08:34


120 MG








Lab





Laboratory Tests








Test


 3/27/20


17:45 3/28/20


00:15 3/28/20


00:36 3/28/20


05:40


 


Sodium Level


 139 mmol/L


(136-145) 139 mmol/L


(136-145) 


 139 mmol/L


(136-145)


 


Potassium Level


 4.4 mmol/L


(3.5-5.1) 4.4 mmol/L


(3.5-5.1) 


 4.3 mmol/L


(3.5-5.1)


 


Chloride Level


 105 mmol/L


() 106 mmol/L


() 


 105 mmol/L


()


 


Carbon Dioxide Level


 27 mmol/L


(21-32) 26 mmol/L


(21-32) 


 26 mmol/L


(21-32)


 


Anion Gap 7 (6-14)  7 (6-14)   8 (6-14) 


 


Blood Urea Nitrogen


 41 mg/dL


(7-20) 43 mg/dL


(7-20) 


 38 mg/dL


(7-20)


 


Creatinine


 1.5 mg/dL


(0.6-1.0) 1.5 mg/dL


(0.6-1.0) 


 1.4 mg/dL


(0.6-1.0)


 


Estimated GFR


(Cockcroft-Gault) 36.9 


 36.9 


 


 40.0 





 


Glucose Level


 259 mg/dL


(70-99) 258 mg/dL


(70-99) 


 210 mg/dL


(70-99)


 


Calcium Level


 7.8 mg/dL


(8.5-10.1) 7.9 mg/dL


(8.5-10.1) 


 8.2 mg/dL


(8.5-10.1)


 


Magnesium Level


 2.2 mg/dL


(1.8-2.4) 2.2 mg/dL


(1.8-2.4) 


 2.3 mg/dL


(1.8-2.4)


 


Phosphorus Level


 


 3.7 mg/dL


(2.6-4.7) 


 3.7 mg/dL


(2.6-4.7)


 


Glucose (Fingerstick)


 


 


 251 mg/dL


(70-99) 





 


White Blood Count


 


 


 


 38.1 x10^3/uL


(4.0-11.0)


 


Red Blood Count


 


 


 


 2.38 x10^6/uL


(3.50-5.40)


 


Hemoglobin


 


 


 


 7.6 g/dL


(12.0-15.5)


 


Hematocrit


 


 


 


 23.4 %


(36.0-47.0)


 


Mean Corpuscular Volume    98 fL () 


 


Mean Corpuscular Hemoglobin    32 pg (25-35) 


 


Mean Corpuscular Hemoglobin


Concent 


 


 


 33 g/dL


(31-37)


 


Red Cell Distribution Width


 


 


 


 15.7 %


(11.5-14.5)


 


Platelet Count


 


 


 


 308 x10^3/uL


(140-400)


 


Neutrophils (%) (Auto)    91 % (31-73) 


 


Lymphocytes (%) (Auto)    5 % (24-48) 


 


Monocytes (%) (Auto)    3 % (0-9) 


 


Eosinophils (%) (Auto)    1 % (0-3) 


 


Basophils (%) (Auto)    0 % (0-3) 


 


Neutrophils # (Auto)


 


 


 


 34.5 x10^3/uL


(1.8-7.7)


 


Lymphocytes # (Auto)


 


 


 


 1.9 x10^3/uL


(1.0-4.8)


 


Monocytes # (Auto)


 


 


 


 1.2 x10^3/uL


(0.0-1.1)


 


Eosinophils # (Auto)


 


 


 


 0.4 x10^3/uL


(0.0-0.7)


 


Basophils # (Auto)


 


 


 


 0.1 x10^3/uL


(0.0-0.2)


 


Test


 3/28/20


05:49 3/28/20


08:00 3/28/20


11:30 3/28/20


11:45


 


Glucose (Fingerstick)


 195 mg/dL


(70-99) 


 


 231 mg/dL


(70-99)


 


O2 Saturation  93 % (92-99)   


 


Arterial Blood pH


 


 7.34


(7.35-7.45) 


 





 


Arterial Blood pCO2 at


Patient Temp 


 43 mmHg


(35-46) 


 





 


Arterial Blood pO2 at Patient


Temp 


 79 mmHg


() 


 





 


Arterial Blood HCO3


 


 23 mmol/L


(21-28) 


 





 


Arterial Blood Base Excess


 


 -3 mmol/L


(-3-3) 


 





 


FiO2  30% vent   


 


Sodium Level


 


 


 139 mmol/L


(136-145) 





 


Potassium Level


 


 


 4.4 mmol/L


(3.5-5.1) 





 


Chloride Level


 


 


 105 mmol/L


() 





 


Carbon Dioxide Level


 


 


 28 mmol/L


(21-32) 





 


Anion Gap   6 (6-14)  


 


Blood Urea Nitrogen


 


 


 38 mg/dL


(7-20) 





 


Creatinine


 


 


 1.3 mg/dL


(0.6-1.0) 





 


Estimated GFR


(Cockcroft-Gault) 


 


 43.5 


 





 


Glucose Level


 


 


 235 mg/dL


(70-99) 





 


Calcium Level


 


 


 8.1 mg/dL


(8.5-10.1) 





 


Phosphorus Level


 


 


 3.3 mg/dL


(2.6-4.7) 





 


Magnesium Level


 


 


 2.2 mg/dL


(1.8-2.4) 











Results


All relevant outside records, renal labs, imaging studies, telemetry/EKG's were 

reviewed.


Other


1.  Diffuse interstitial and alveolar opacities, slightly decreased within


the upper lungs.


2.  Small bilateral layering pleural effusions, unchanged.











KIMBERLY MYERS MD                Mar 28, 2020 12:44

## 2020-03-28 NOTE — PDOC
PROGRESS NOTES


Assessment


Assessment


IMPRESSION:


Respiratory failure.


Seizure.


Metabolic encephalopathy.


Fever 102.7 degree.


Metabolic acidosis.


Diffuse pulmonary infiltrate.


Pleural effusion.


Pancreatitis


Gallstone.


Leukocytosis.


Electrolytes imbalances.


Hyperglycemia.


DM.


HTN.


HLD.


Anemia.


Obesity.





RECOMMENDATIONS/PLAN:


Continue life support in ICU at the present time.


Keppra if has further seizures.


Lab: see orders, including pending Covid-19 results.


Treat medical diseases.





EEG last week: No seizure activity.





Past Medical History


Cardiovascular:  HTN, Hyperlipidemia


Endocrine:  Diabetes





Family History


Unobtainable.





Social HistoryU


not obtainable





Allergies


Coded Allergies:  


     codeine (Verified  Allergy, Intermediate, rash, 3/16/20)





ROS


Unobtainable in unresponsive state.





PHYSICAL EXAMINATION:   


General appearance in sub acute distress.  


HEENT:  Normocephalic and nontraumatic.  Eyes, nose, ears, and throat are 

unremarkable.


Neck is supple. No lymphadenopathy. 


Cardiovascular:  S1, S2.  


Pulmonary:  On vent.. 


Abdomen:  Bowel sounds are weak.  


Extremities:  No rash, lesions.  





NEUROLOGICAL  EXAMINATION:


Unresponsive.


On vent.


Not oriented to time, place and person.


PERRL.


EOMI not elicited,


CN: no acute focal findings.


Muscle tone: within normal.


Muscle strength: No movements to stimuli.


DTR: 0-1


Plantar reflex: No response bilaterally 


Gait: not able to walk.


Sensory exam: no response to stimuli..


Not able to access cerebellar signs.


F-T-N test not performed due to unresponsiveness





Objective


Objective





Vital Signs








  Date Time  Temp Pulse Resp B/P (MAP) Pulse Ox O2 Delivery O2 Flow Rate FiO2


 


3/28/20 13:00  107 27 97/71 (80) 98 Ventilator  


 


3/28/20 08:00 98.4       





 98.4       














Intake and Output 


 


 3/28/20





 07:00


 


Intake Total 1944 ml


 


Output Total 790 ml


 


Balance 1154 ml


 


 


 


IV Total 1944 ml


 


Output Urine Total 540 ml


 


Gastric Drainage Total 250 ml











Vitals Signs


Vitals





                          VS - Last 72 Hours, by Label








  Date Time  Temp Pulse Resp B/P (MAP) Pulse Ox O2 Delivery O2 Flow Rate FiO2


 


3/28/20 13:00  107 27 97/71 (80) 98 Ventilator  


 


3/28/20 12:45     98 Ventilator  


 


3/28/20 12:00  115 25 111/62 (78) 98 Ventilator  


 


3/28/20 12:00      Mechanical Ventilator  


 


3/28/20 11:00  104 27 111/69 (83) 98 Ventilator  


 


3/28/20 10:00  105 26 119/84 (96) 98 Ventilator  


 


3/28/20 09:00  108 26 133/58 (83) 98 Ventilator  


 


3/28/20 08:06     98 Ventilator  


 


3/28/20 08:00      Mechanical Ventilator  


 


3/28/20 08:00 98.4 107 26 113/85 (94) 94 Ventilator  





 98.4       


 


3/28/20 07:00  103 25 78/61 (67) 98 Ventilator  


 


3/28/20 06:00  104 25 103/56 (72) 98 Ventilator  


 


3/28/20 05:00  107 25 126/73 (90) 100 Ventilator  


 


3/28/20 04:00 98.5 109 25 97/59 (72) 100 Ventilator  





 98.5       


 


3/28/20 04:00      Mechanical Ventilator  


 


3/28/20 03:50  106 25 114/65 (81) 100 Ventilator  


 


3/28/20 03:35  109 25 85/78 (80) 100 Ventilator  


 


3/28/20 03:34     98 Ventilator  


 


3/28/20 03:15  114 25 123/89 (100) 100 Ventilator  


 


3/28/20 03:00  118 25 76/62 (67) 99 Ventilator  


 


3/28/20 02:00  122 25 108/59 (75) 99 Ventilator  


 


3/28/20 01:00  111 25 101/50 (67) 99 Ventilator  


 


3/27/20 23:59 98.9 106 25 109/77 (88) 97 Ventilator  





 98.9       


 


3/27/20 23:59      Mechanical Ventilator  


 


3/27/20 23:20     98 Ventilator  


 


3/27/20 23:00  106 25 97/74 (82) 99 Ventilator  


 


3/27/20 23:00   25  99 Ventilator  


 


3/27/20 22:33   25  96   


 


3/27/20 22:00  109 25 94/70 (78) 96 Ventilator  


 


3/27/20 21:00  103 25 96/65 (75) 100 Ventilator  


 


3/27/20 20:00 98.4 108 25 93/73 (80) 97 Ventilator  





 98.4       


 


3/27/20 20:00      Mechanical Ventilator  


 


3/27/20 19:40     98 Ventilator  


 


3/27/20 19:00  100 25 102/66 (78) 99 Ventilator  


 


3/27/20 17:00  106 25 86/60 (69) 100 Ventilator  


 


3/27/20 16:23     100 Ventilator  


 


3/27/20 16:00 98.0 100 25 130/78 (95) 100 Ventilator  





 98.0       


 


3/27/20 16:00      Mechanical Ventilator  


 


3/27/20 15:00  103 25 133/52 (79) 100 Ventilator  


 


3/27/20 14:23  102 26 109/62 (78) 100 Ventilator  


 


3/27/20 14:00  104 25  100 Ventilator  


 


3/27/20 13:00  114 25 106/75 (85) 100 Ventilator  


 


3/27/20 12:00      Mechanical Ventilator  


 


3/27/20 12:00 99.4 113 25 86/55 (65) 100 Ventilator  





 99.4       


 


3/27/20 11:00  115 25 105/62 (76) 100 Ventilator  


 


3/27/20 10:08     100 Ventilator  


 


3/27/20 10:00  122 26 82/69 (73) 100 Ventilator  


 


3/27/20 09:00  124 26 121/92 (102) 100 Ventilator  


 


3/27/20 08:00  124 26 108/57 (74) 100 Ventilator  


 


3/27/20 08:00      Mechanical Ventilator  


 


3/27/20 07:00 98.4 102 27 122/83 (96) 100 Ventilator  





 98.4       











Laboratory


Laboratory





Laboratory Tests








Test


 3/27/20


17:45 3/28/20


00:15 3/28/20


00:36 3/28/20


05:40


 


Sodium Level


 139 mmol/L


(136-145) 139 mmol/L


(136-145) 


 139 mmol/L


(136-145)


 


Potassium Level


 4.4 mmol/L


(3.5-5.1) 4.4 mmol/L


(3.5-5.1) 


 4.3 mmol/L


(3.5-5.1)


 


Chloride Level


 105 mmol/L


() 106 mmol/L


() 


 105 mmol/L


()


 


Carbon Dioxide Level


 27 mmol/L


(21-32) 26 mmol/L


(21-32) 


 26 mmol/L


(21-32)


 


Anion Gap 7 (6-14)  7 (6-14)   8 (6-14) 


 


Blood Urea Nitrogen


 41 mg/dL


(7-20) 43 mg/dL


(7-20) 


 38 mg/dL


(7-20)


 


Creatinine


 1.5 mg/dL


(0.6-1.0) 1.5 mg/dL


(0.6-1.0) 


 1.4 mg/dL


(0.6-1.0)


 


Estimated GFR


(Cockcroft-Gault) 36.9 


 36.9 


 


 40.0 





 


Glucose Level


 259 mg/dL


(70-99) 258 mg/dL


(70-99) 


 210 mg/dL


(70-99)


 


Calcium Level


 7.8 mg/dL


(8.5-10.1) 7.9 mg/dL


(8.5-10.1) 


 8.2 mg/dL


(8.5-10.1)


 


Magnesium Level


 2.2 mg/dL


(1.8-2.4) 2.2 mg/dL


(1.8-2.4) 


 2.3 mg/dL


(1.8-2.4)


 


Phosphorus Level


 


 3.7 mg/dL


(2.6-4.7) 


 3.7 mg/dL


(2.6-4.7)


 


Glucose (Fingerstick)


 


 


 251 mg/dL


(70-99) 





 


White Blood Count


 


 


 


 38.1 x10^3/uL


(4.0-11.0)


 


Red Blood Count


 


 


 


 2.38 x10^6/uL


(3.50-5.40)


 


Hemoglobin


 


 


 


 7.6 g/dL


(12.0-15.5)


 


Hematocrit


 


 


 


 23.4 %


(36.0-47.0)


 


Mean Corpuscular Volume    98 fL () 


 


Mean Corpuscular Hemoglobin    32 pg (25-35) 


 


Mean Corpuscular Hemoglobin


Concent 


 


 


 33 g/dL


(31-37)


 


Red Cell Distribution Width


 


 


 


 15.7 %


(11.5-14.5)


 


Platelet Count


 


 


 


 308 x10^3/uL


(140-400)


 


Neutrophils (%) (Auto)    91 % (31-73) 


 


Lymphocytes (%) (Auto)    5 % (24-48) 


 


Monocytes (%) (Auto)    3 % (0-9) 


 


Eosinophils (%) (Auto)    1 % (0-3) 


 


Basophils (%) (Auto)    0 % (0-3) 


 


Neutrophils # (Auto)


 


 


 


 34.5 x10^3/uL


(1.8-7.7)


 


Lymphocytes # (Auto)


 


 


 


 1.9 x10^3/uL


(1.0-4.8)


 


Monocytes # (Auto)


 


 


 


 1.2 x10^3/uL


(0.0-1.1)


 


Eosinophils # (Auto)


 


 


 


 0.4 x10^3/uL


(0.0-0.7)


 


Basophils # (Auto)


 


 


 


 0.1 x10^3/uL


(0.0-0.2)


 


Test


 3/28/20


05:49 3/28/20


08:00 3/28/20


11:30 3/28/20


11:45


 


Glucose (Fingerstick)


 195 mg/dL


(70-99) 


 


 231 mg/dL


(70-99)


 


O2 Saturation  93 % (92-99)   


 


Arterial Blood pH


 


 7.34


(7.35-7.45) 


 





 


Arterial Blood pCO2 at


Patient Temp 


 43 mmHg


(35-46) 


 





 


Arterial Blood pO2 at Patient


Temp 


 79 mmHg


() 


 





 


Arterial Blood HCO3


 


 23 mmol/L


(21-28) 


 





 


Arterial Blood Base Excess


 


 -3 mmol/L


(-3-3) 


 





 


FiO2  30% vent   


 


Sodium Level


 


 


 139 mmol/L


(136-145) 





 


Potassium Level


 


 


 4.4 mmol/L


(3.5-5.1) 





 


Chloride Level


 


 


 105 mmol/L


() 





 


Carbon Dioxide Level


 


 


 28 mmol/L


(21-32) 





 


Anion Gap   6 (6-14)  


 


Blood Urea Nitrogen


 


 


 38 mg/dL


(7-20) 





 


Creatinine


 


 


 1.3 mg/dL


(0.6-1.0) 





 


Estimated GFR


(Cockcroft-Gault) 


 


 43.5 


 





 


Glucose Level


 


 


 235 mg/dL


(70-99) 





 


Calcium Level


 


 


 8.1 mg/dL


(8.5-10.1) 





 


Phosphorus Level


 


 


 3.3 mg/dL


(2.6-4.7) 





 


Magnesium Level


 


 


 2.2 mg/dL


(1.8-2.4) 











Microbiology


3/24/20 Blood Culture - Preliminary, Resulted


          NO GROWTH AFTER 4 DAYS





Medication


Medications





Current Medications


Midazolam HCl 100 mg/Sodium Chloride 100 ml @ 7 mls/hr CONT  PRN IV SEE PROTOCOL

;  Start 3/28/20 at 16:00


Sodium Chloride 90 meq/Potassium Chloride 15 meq/ Potassium Phosphate 18 mmol/ 

Magnesium Sulfate 8 meq/Calcium Gluconate 15 meq/ Multivitamins 10 ml/Chromium/ 

Copper/Manganese/ Seleni/Zn 0.5 ml/ Insulin Human Regular 10 unit/ Total 

Parenteral Nutrition/Amino Acids/Dextrose/ Fat Emulsion Intravenous 1,400 ml @  

58.333 mls/ hr TPN  CONT IV  Last administered on 3/27/20at 21:43;  Start 

3/27/20 at 22:00;  Stop 3/28/20 at 21:59





Comment


Review of Relevant


I have reviewed the following items josy (where applicable) has been applied.











DORYS LANDA MD                 Mar 28, 2020 14:39

## 2020-03-29 VITALS — SYSTOLIC BLOOD PRESSURE: 100 MMHG | DIASTOLIC BLOOD PRESSURE: 79 MMHG

## 2020-03-29 VITALS — SYSTOLIC BLOOD PRESSURE: 114 MMHG | DIASTOLIC BLOOD PRESSURE: 57 MMHG

## 2020-03-29 VITALS — SYSTOLIC BLOOD PRESSURE: 109 MMHG | DIASTOLIC BLOOD PRESSURE: 85 MMHG

## 2020-03-29 VITALS — DIASTOLIC BLOOD PRESSURE: 50 MMHG | SYSTOLIC BLOOD PRESSURE: 104 MMHG

## 2020-03-29 VITALS — DIASTOLIC BLOOD PRESSURE: 56 MMHG | SYSTOLIC BLOOD PRESSURE: 105 MMHG

## 2020-03-29 VITALS — SYSTOLIC BLOOD PRESSURE: 104 MMHG | DIASTOLIC BLOOD PRESSURE: 68 MMHG

## 2020-03-29 VITALS — SYSTOLIC BLOOD PRESSURE: 108 MMHG | DIASTOLIC BLOOD PRESSURE: 84 MMHG

## 2020-03-29 VITALS — SYSTOLIC BLOOD PRESSURE: 118 MMHG | DIASTOLIC BLOOD PRESSURE: 92 MMHG

## 2020-03-29 VITALS — SYSTOLIC BLOOD PRESSURE: 129 MMHG | DIASTOLIC BLOOD PRESSURE: 63 MMHG

## 2020-03-29 VITALS — SYSTOLIC BLOOD PRESSURE: 114 MMHG | DIASTOLIC BLOOD PRESSURE: 79 MMHG

## 2020-03-29 VITALS — DIASTOLIC BLOOD PRESSURE: 82 MMHG | SYSTOLIC BLOOD PRESSURE: 141 MMHG

## 2020-03-29 VITALS — SYSTOLIC BLOOD PRESSURE: 91 MMHG | DIASTOLIC BLOOD PRESSURE: 66 MMHG

## 2020-03-29 VITALS — SYSTOLIC BLOOD PRESSURE: 92 MMHG | DIASTOLIC BLOOD PRESSURE: 58 MMHG

## 2020-03-29 VITALS — DIASTOLIC BLOOD PRESSURE: 59 MMHG | SYSTOLIC BLOOD PRESSURE: 96 MMHG

## 2020-03-29 VITALS — DIASTOLIC BLOOD PRESSURE: 73 MMHG | SYSTOLIC BLOOD PRESSURE: 109 MMHG

## 2020-03-29 VITALS — DIASTOLIC BLOOD PRESSURE: 68 MMHG | SYSTOLIC BLOOD PRESSURE: 128 MMHG

## 2020-03-29 VITALS — DIASTOLIC BLOOD PRESSURE: 83 MMHG | SYSTOLIC BLOOD PRESSURE: 114 MMHG

## 2020-03-29 VITALS — SYSTOLIC BLOOD PRESSURE: 116 MMHG | DIASTOLIC BLOOD PRESSURE: 84 MMHG

## 2020-03-29 VITALS — DIASTOLIC BLOOD PRESSURE: 94 MMHG | SYSTOLIC BLOOD PRESSURE: 136 MMHG

## 2020-03-29 VITALS — DIASTOLIC BLOOD PRESSURE: 72 MMHG | SYSTOLIC BLOOD PRESSURE: 119 MMHG

## 2020-03-29 VITALS — SYSTOLIC BLOOD PRESSURE: 105 MMHG | DIASTOLIC BLOOD PRESSURE: 67 MMHG

## 2020-03-29 VITALS — DIASTOLIC BLOOD PRESSURE: 68 MMHG | SYSTOLIC BLOOD PRESSURE: 106 MMHG

## 2020-03-29 VITALS — DIASTOLIC BLOOD PRESSURE: 53 MMHG | SYSTOLIC BLOOD PRESSURE: 103 MMHG

## 2020-03-29 VITALS — SYSTOLIC BLOOD PRESSURE: 108 MMHG | DIASTOLIC BLOOD PRESSURE: 56 MMHG

## 2020-03-29 VITALS — SYSTOLIC BLOOD PRESSURE: 102 MMHG | DIASTOLIC BLOOD PRESSURE: 65 MMHG

## 2020-03-29 VITALS — DIASTOLIC BLOOD PRESSURE: 67 MMHG | SYSTOLIC BLOOD PRESSURE: 114 MMHG

## 2020-03-29 VITALS — SYSTOLIC BLOOD PRESSURE: 90 MMHG | DIASTOLIC BLOOD PRESSURE: 58 MMHG

## 2020-03-29 VITALS — SYSTOLIC BLOOD PRESSURE: 102 MMHG | DIASTOLIC BLOOD PRESSURE: 52 MMHG

## 2020-03-29 LAB
ANION GAP SERPL CALC-SCNC: 5 MMOL/L (ref 6–14)
ANION GAP SERPL CALC-SCNC: 5 MMOL/L (ref 6–14)
ANION GAP SERPL CALC-SCNC: 7 MMOL/L (ref 6–14)
ANION GAP SERPL CALC-SCNC: 8 MMOL/L (ref 6–14)
BASE EXCESS ABG: -2 MMOL/L (ref -3–3)
BASOPHILS # BLD AUTO: 0.2 X10^3/UL (ref 0–0.2)
BASOPHILS NFR BLD: 1 % (ref 0–3)
BUN SERPL-MCNC: 34 MG/DL (ref 7–20)
BUN SERPL-MCNC: 36 MG/DL (ref 7–20)
BUN SERPL-MCNC: 36 MG/DL (ref 7–20)
BUN SERPL-MCNC: 38 MG/DL (ref 7–20)
CALCIUM SERPL-MCNC: 7.8 MG/DL (ref 8.5–10.1)
CALCIUM SERPL-MCNC: 8.1 MG/DL (ref 8.5–10.1)
CALCIUM SERPL-MCNC: 8.2 MG/DL (ref 8.5–10.1)
CALCIUM SERPL-MCNC: 8.2 MG/DL (ref 8.5–10.1)
CHLORIDE SERPL-SCNC: 103 MMOL/L (ref 98–107)
CHLORIDE SERPL-SCNC: 105 MMOL/L (ref 98–107)
CO2 SERPL-SCNC: 26 MMOL/L (ref 21–32)
CO2 SERPL-SCNC: 27 MMOL/L (ref 21–32)
CO2 SERPL-SCNC: 28 MMOL/L (ref 21–32)
CO2 SERPL-SCNC: 28 MMOL/L (ref 21–32)
CREAT SERPL-MCNC: 1.2 MG/DL (ref 0.6–1)
CREAT SERPL-MCNC: 1.3 MG/DL (ref 0.6–1)
EOSINOPHIL NFR BLD: 0.5 X10^3/UL (ref 0–0.7)
EOSINOPHIL NFR BLD: 1 % (ref 0–3)
ERYTHROCYTE [DISTWIDTH] IN BLOOD BY AUTOMATED COUNT: 15.7 % (ref 11.5–14.5)
GFR SERPLBLD BASED ON 1.73 SQ M-ARVRAT: 43.5 ML/MIN
GFR SERPLBLD BASED ON 1.73 SQ M-ARVRAT: 47.7 ML/MIN
GLUCOSE SERPL-MCNC: 202 MG/DL (ref 70–99)
GLUCOSE SERPL-MCNC: 210 MG/DL (ref 70–99)
GLUCOSE SERPL-MCNC: 211 MG/DL (ref 70–99)
GLUCOSE SERPL-MCNC: 217 MG/DL (ref 70–99)
HCO3 BLDA-SCNC: 23 MMOL/L (ref 21–28)
HCT VFR BLD CALC: 20.7 % (ref 36–47)
HGB BLD-MCNC: 6.8 G/DL (ref 12–15.5)
INSPIRATION SETTING TIME VENT: 40
LYMPHOCYTES # BLD: 1.9 X10^3/UL (ref 1–4.8)
LYMPHOCYTES NFR BLD AUTO: 5 % (ref 24–48)
MAGNESIUM SERPL-MCNC: 2.3 MG/DL (ref 1.8–2.4)
MCH RBC QN AUTO: 32 PG (ref 25–35)
MCHC RBC AUTO-ENTMCNC: 33 G/DL (ref 31–37)
MCV RBC AUTO: 99 FL (ref 79–100)
MONO #: 1.4 X10^3/UL (ref 0–1.1)
MONOCYTES NFR BLD: 4 % (ref 0–9)
NEUT #: 34.7 X10^3/UL (ref 1.8–7.7)
NEUTROPHILS NFR BLD AUTO: 90 % (ref 31–73)
PCO2 BLDA: 41 MMHG (ref 35–46)
PLATELET # BLD AUTO: 319 X10^3/UL (ref 140–400)
PO2 BLDA: 72 MMHG (ref 75–108)
POTASSIUM SERPL-SCNC: 4.3 MMOL/L (ref 3.5–5.1)
POTASSIUM SERPL-SCNC: 4.3 MMOL/L (ref 3.5–5.1)
POTASSIUM SERPL-SCNC: 4.4 MMOL/L (ref 3.5–5.1)
POTASSIUM SERPL-SCNC: 4.6 MMOL/L (ref 3.5–5.1)
RBC # BLD AUTO: 2.1 X10^6/UL (ref 3.5–5.4)
SAO2 % BLDA: 92 % (ref 92–99)
SODIUM SERPL-SCNC: 138 MMOL/L (ref 136–145)
WBC # BLD AUTO: 38.7 X10^3/UL (ref 4–11)

## 2020-03-29 RX ADMIN — DAPTOMYCIN SCH MLS/HR: 500 INJECTION, POWDER, LYOPHILIZED, FOR SOLUTION INTRAVENOUS at 08:10

## 2020-03-29 RX ADMIN — CEFEPIME SCH GM: 2 INJECTION, POWDER, FOR SOLUTION INTRAVENOUS at 09:42

## 2020-03-29 RX ADMIN — POTASSIUM CHLORIDE SCH MLS/HR: 2 INJECTION, SOLUTION, CONCENTRATE INTRAVENOUS at 14:52

## 2020-03-29 RX ADMIN — POTASSIUM CHLORIDE SCH MLS/HR: 2 INJECTION, SOLUTION, CONCENTRATE INTRAVENOUS at 05:29

## 2020-03-29 RX ADMIN — POTASSIUM CHLORIDE SCH MLS/HR: 2 INJECTION, SOLUTION, CONCENTRATE INTRAVENOUS at 14:53

## 2020-03-29 RX ADMIN — INSULIN LISPRO SCH UNITS: 100 INJECTION, SOLUTION INTRAVENOUS; SUBCUTANEOUS at 06:06

## 2020-03-29 RX ADMIN — PANTOPRAZOLE SODIUM SCH MG: 40 INJECTION, POWDER, FOR SOLUTION INTRAVENOUS at 08:19

## 2020-03-29 RX ADMIN — POTASSIUM CHLORIDE SCH MLS/HR: 2 INJECTION, SOLUTION, CONCENTRATE INTRAVENOUS at 14:54

## 2020-03-29 RX ADMIN — CEFEPIME SCH GM: 2 INJECTION, POWDER, FOR SOLUTION INTRAVENOUS at 20:45

## 2020-03-29 RX ADMIN — POTASSIUM CHLORIDE SCH MLS/HR: 2 INJECTION, SOLUTION, CONCENTRATE INTRAVENOUS at 05:28

## 2020-03-29 RX ADMIN — Medication PRN EACH: at 13:48

## 2020-03-29 RX ADMIN — MIDAZOLAM HYDROCHLORIDE PRN MLS/HR: 5 INJECTION, SOLUTION INTRAMUSCULAR; INTRAVENOUS at 12:28

## 2020-03-29 RX ADMIN — DEXTROSE SCH MLS/HR: 50 INJECTION, SOLUTION INTRAVENOUS at 09:41

## 2020-03-29 RX ADMIN — INSULIN LISPRO SCH UNITS: 100 INJECTION, SOLUTION INTRAVENOUS; SUBCUTANEOUS at 12:13

## 2020-03-29 RX ADMIN — POTASSIUM CHLORIDE SCH MLS/HR: 2 INJECTION, SOLUTION, CONCENTRATE INTRAVENOUS at 20:07

## 2020-03-29 RX ADMIN — AMIODARONE HYDROCHLORIDE PRN MLS/HR: 50 INJECTION, SOLUTION INTRAVENOUS at 03:38

## 2020-03-29 RX ADMIN — POTASSIUM CHLORIDE SCH MLS/HR: 2 INJECTION, SOLUTION, CONCENTRATE INTRAVENOUS at 16:55

## 2020-03-29 RX ADMIN — POTASSIUM CHLORIDE SCH MLS/HR: 2 INJECTION, SOLUTION, CONCENTRATE INTRAVENOUS at 20:08

## 2020-03-29 RX ADMIN — INSULIN LISPRO SCH UNITS: 100 INJECTION, SOLUTION INTRAVENOUS; SUBCUTANEOUS at 17:46

## 2020-03-29 NOTE — PDOC
Infectious Disease Note


Subjective


Subjective


Intubated FiO2 30%


No fevers last 24 hrs





ROS


ROS


unobtainable





Vital Sign


Vital Signs





Vital Signs








  Date Time  Temp Pulse Resp B/P (MAP) Pulse Ox O2 Delivery O2 Flow Rate FiO2


 


3/29/20 08:12     96 Ventilator  


 


3/29/20 06:00  112 25 103/53 (70)    


 


3/29/20 04:00 98.4       





 98.4       











Physical Exam


PHYSICAL EXAM


GENERAL: Orally intubated/sedated - generalizd anasarca 


HEENT: Mild icterus, pupils equal, ETT, NGT


NECK:  Supple. 


LUNGS:  Decreased breath sounds at the bases.


HEART:  S1, S2, regular 


ABDOMEN:  Distended, hypoactive BS, Rectal tube in place 


: [place]   


EXTREMITIES: Generalized edema, no cyanosis - some mottling, [place] boots 

bilaterally 


DERMATOLOGIC:  Warm and dry.  No generalized rash.  


CENTRAL NERVOUS SYSTEM: Sedated 


RIJ Temp HDC, RUE-PICC & LIJ - clean





Labs


Lab





Laboratory Tests








Test


 3/28/20


11:30 3/28/20


11:45 3/28/20


17:47 3/28/20


17:48


 


Sodium Level


 139 mmol/L


(136-145) 


 


 138 mmol/L


(136-145)


 


Potassium Level


 4.4 mmol/L


(3.5-5.1) 


 


 4.6 mmol/L


(3.5-5.1)


 


Chloride Level


 105 mmol/L


() 


 


 105 mmol/L


()


 


Carbon Dioxide Level


 28 mmol/L


(21-32) 


 


 27 mmol/L


(21-32)


 


Anion Gap 6 (6-14)    6 (6-14) 


 


Blood Urea Nitrogen


 38 mg/dL


(7-20) 


 


 41 mg/dL


(7-20)


 


Creatinine


 1.3 mg/dL


(0.6-1.0) 


 


 1.4 mg/dL


(0.6-1.0)


 


Estimated GFR


(Cockcroft-Gault) 43.5 


 


 


 40.0 





 


Glucose Level


 235 mg/dL


(70-99) 


 


 233 mg/dL


(70-99)


 


Calcium Level


 8.1 mg/dL


(8.5-10.1) 


 


 8.0 mg/dL


(8.5-10.1)


 


Phosphorus Level


 3.3 mg/dL


(2.6-4.7) 


 


 3.6 mg/dL


(2.6-4.7)


 


Magnesium Level


 2.2 mg/dL


(1.8-2.4) 


 


 2.1 mg/dL


(1.8-2.4)


 


Glucose (Fingerstick)


 


 231 mg/dL


(70-99) 231 mg/dL


(70-99) 





 


Test


 3/28/20


23:30 3/29/20


05:40 3/29/20


05:41 





 


Sodium Level


 138 mmol/L


(136-145) 138 mmol/L


(136-145) 


 





 


Potassium Level


 4.3 mmol/L


(3.5-5.1) 4.6 mmol/L


(3.5-5.1) 


 





 


Chloride Level


 105 mmol/L


() 105 mmol/L


() 


 





 


Carbon Dioxide Level


 26 mmol/L


(21-32) 28 mmol/L


(21-32) 


 





 


Anion Gap 7 (6-14)  5 (6-14)   


 


Blood Urea Nitrogen


 38 mg/dL


(7-20) 36 mg/dL


(7-20) 


 





 


Creatinine


 1.3 mg/dL


(0.6-1.0) 1.3 mg/dL


(0.6-1.0) 


 





 


Estimated GFR


(Cockcroft-Gault) 43.5 


 43.5 


 


 





 


Glucose Level


 217 mg/dL


(70-99) 202 mg/dL


(70-99) 


 





 


Glucose (Fingerstick)


 199 mg/dL


(70-99) 


 189 mg/dL


(70-99) 





 


Calcium Level


 8.1 mg/dL


(8.5-10.1) 8.2 mg/dL


(8.5-10.1) 


 





 


Phosphorus Level


 3.3 mg/dL


(2.6-4.7) 3.5 mg/dL


(2.6-4.7) 


 





 


Magnesium Level


 2.3 mg/dL


(1.8-2.4) 2.3 mg/dL


(1.8-2.4) 


 











Micro





Microbiology


3/18/20 Blood Culture - Preliminary, Resulted


          NO GROWTH AFTER 3 DAYS





Objective


Assessment


Leukocytosis - increased - ? reactive - trouble with CRRT/S/p PRBCs ? intra-

abdominal 


Fever - better - Flu neg antigen 3/26 ? reactive with pancreatitis vs ID - C-di

ff neg. S/p HD cath change with malfunction 3/25 - cults 3/24 neg


Lung opacities, COVID-19 neg 


Hypotension 


Acute pancreatitis, early developing necrosis -U/S 3/26 reviewed


JED,Hyperkalemia, Metabolic acidosis on CRRT


   - s/p RIJ temporary dialysis catheter replacement, 3/25


Anasarca - worse


Acute hypoxic resp failure, intubated


? developing peripheral ischemia - better


Cholelithiasis


Anemia - S/p PRBC 3/26


Hypocalcemia 


Prediabetes


HTN





Plan


Plan of Care


Continue Dapto/cefepime (3/25), Flagyl and micafungin (3/23) 


   -Previously on Merrem, Zyvox 


F/u Blood cults 3/24 so far neg


CT ordered abd/pelvis but on CRRT will need when stable


No surgical plans at this time


Maintain aspiration precautions 


Airborne isolation d/c'd. COVID-19 neg  


Repeat CBC








D/w nursing





Critically ill


Attending Co-Sign


The patient was seen and interviewed as well as examined at the bedside. The 

chart was reviewed. The case was discussed. Agree with the plan of care.











HAVEN WINTERS        Mar 29, 2020 08:19


LINN FRANZ MD               Mar 29, 2020 12:02

## 2020-03-29 NOTE — PDOC
PROGRESS NOTES


Assessment


Assessment


Respiratory failure.


Seizure.


Metabolic encephalopathy.


Fever 102.7 degree.


Metabolic acidosis.


Diffuse pulmonary infiltrate.


Pleural effusion.


Pancreatitis


Gallstone.


Leukocytosis.


Electrolytes imbalances.


Hyperglycemia.


DM.


HTN.


HLD.


Anemia.


Obesity.





RECOMMENDATIONS/PLAN:


Continue life support in ICU at the present time.


Keppra if has further seizures.


Treat medical diseases.





EEG last week: No seizure activity.





Past Medical History


Cardiovascular:  HTN, Hyperlipidemia


Endocrine:  Diabetes





Family History


Unobtainable.





Social HistoryU


not obtainable





Allergies


Coded Allergies:  


     codeine (Verified  Allergy, Intermediate, rash, 3/16/20)





ROS


Unobtainable in unresponsive state.





PHYSICAL EXAMINATION:   


General appearance in sub acute distress.  


HEENT:  Normocephalic and nontraumatic.  Eyes, nose, ears, and throat are 

unremarkable.


Neck is supple. No lymphadenopathy. 


Cardiovascular:  S1, S2.  


Pulmonary:  On vent.. 


Abdomen:  Bowel sounds are weak.  


Extremities:  No rash, lesions.  





NEUROLOGICAL  EXAMINATION:


Unresponsive.


On vent.


Not oriented to time, place and person.


PERRL.


EOMI not elicited,


CN: no acute focal findings.


Muscle tone: within normal.


Muscle strength: No movements to stimuli.


DTR: 0-1


Plantar reflex: No response bilaterally 


Gait: not able to walk.


Sensory exam: no response to stimuli..


Not able to access cerebellar signs.


F-T-N test not performed due to unresponsiveness





Objective


Objective





Vital Signs








  Date Time  Temp Pulse Resp B/P (MAP) Pulse Ox O2 Delivery O2 Flow Rate FiO2


 


3/29/20 14:00  124 24 102/65 (77) 95 Ventilator  


 


3/29/20 11:00 100.0       





 100.0       














Intake and Output 


 


 3/29/20





 07:00


 


Intake Total 1682.8 ml


 


Output Total 415 ml


 


Balance 1267.8 ml


 


 


 


IV Total 1682.8 ml


 


Output Urine Total 415 ml











Vitals Signs


Vitals





                          VS - Last 72 Hours, by Label








  Date Time  Temp Pulse Resp B/P (MAP) Pulse Ox O2 Delivery O2 Flow Rate FiO2


 


3/29/20 14:00  124 24 102/65 (77) 95 Ventilator  


 


3/29/20 13:00  119 24 96/59 (71) 96 Ventilator  


 


3/29/20 12:00      Mechanical Ventilator  


 


3/29/20 12:00  122 26 128/68 (88) 98 Ventilator  


 


3/29/20 11:37     97 Ventilator  


 


3/29/20 11:00 100.0 127 20 141/82 (101) 96 Ventilator  





 100.0       


 


3/29/20 10:00  128 34 136/94 (108) 95 Ventilator  


 


3/29/20 09:00  122 25 114/79 (91) 97 Ventilator  


 


3/29/20 08:59     97 Ventilator  


 


3/29/20 08:42     96 Ventilator  


 


3/29/20 08:15      Mechanical Ventilator  


 


3/29/20 08:12     96 Ventilator  


 


3/29/20 08:00 99.7 120 25 114/67 (83) 95 Ventilator  





 99.7       


 


3/29/20 07:00  112 25 118/92 (101) 98 Ventilator  


 


3/29/20 06:00  112 25 103/53 (70) 98 Ventilator  


 


3/29/20 05:22     93 Ventilator  


 


3/29/20 05:00  102 25 109/73 (85) 98 Ventilator  


 


3/29/20 04:00      Mechanical Ventilator  


 


3/29/20 04:00 98.4 105 25 100/79 (86) 98 Ventilator  





 98.4       


 


3/29/20 03:00  102 25 104/50 (68) 98 Ventilator  


 


3/29/20 02:00  110 25 116/84 (95) 98 Ventilator  


 


3/29/20 01:38      Ventilator  


 


3/29/20 01:00 98.4 102 25 119/72 (88) 98 Ventilator  





 98.4       


 


3/29/20 00:00      Mechanical Ventilator  


 


3/29/20 00:00  103 25 108/84 (92) 98 Ventilator  


 


3/28/20 23:00  118 25 119/72 (88) 98 Ventilator  


 


3/28/20 22:15     96 Ventilator  


 


3/28/20 22:00  116 25 117/64 (81) 98 Ventilator  


 


3/28/20 21:00  124 26 130/82 (98) 98 Ventilator  


 


3/28/20 20:32     95 Ventilator  


 


3/28/20 20:00 98.0 120 26 97/77 (84) 98 Ventilator  





 98.0       


 


3/28/20 20:00      Mechanical Ventilator  


 


3/28/20 19:00  124 26 84/62 (69) 99 Ventilator  


 


3/28/20 18:08  123 26 95/58 (70) 98 Ventilator  


 


3/28/20 17:04     95 Ventilator  


 


3/28/20 17:00  114 26 105/98 (100) 95 Ventilator  


 


3/28/20 16:00 98.5 107 26 119/65 (83) 99 Ventilator  





 98.5       


 


3/28/20 16:00      Mechanical Ventilator  


 


3/28/20 15:00  117 26 119/62 (81) 98 Ventilator  


 


3/28/20 14:00  106 26 113/57 (75) 98 Ventilator  


 


3/28/20 13:00  107 27 97/71 (80) 98 Ventilator  


 


3/28/20 12:45     98 Ventilator  


 


3/28/20 12:00  115 25 111/62 (78) 98 Ventilator  


 


3/28/20 12:00      Mechanical Ventilator  


 


3/28/20 11:00  104 27 111/69 (83) 98 Ventilator  


 


3/28/20 10:00  105 26 119/84 (96) 98 Ventilator  


 


3/28/20 09:00  108 26 133/58 (83) 98 Ventilator  


 


3/28/20 08:06     98 Ventilator  


 


3/28/20 08:00      Mechanical Ventilator  


 


3/28/20 08:00 98.4 107 26 113/85 (94) 94 Ventilator  





 98.4       


 


3/28/20 07:00  103 25 78/61 (67) 98 Ventilator  











Laboratory


Laboratory





Laboratory Tests








Test


 3/28/20


17:47 3/28/20


17:48 3/28/20


23:30 3/29/20


05:40


 


Glucose (Fingerstick)


 231 mg/dL


(70-99) 


 199 mg/dL


(70-99) 





 


Sodium Level


 


 138 mmol/L


(136-145) 138 mmol/L


(136-145) 138 mmol/L


(136-145)


 


Potassium Level


 


 4.6 mmol/L


(3.5-5.1) 4.3 mmol/L


(3.5-5.1) 4.6 mmol/L


(3.5-5.1)


 


Chloride Level


 


 105 mmol/L


() 105 mmol/L


() 105 mmol/L


()


 


Carbon Dioxide Level


 


 27 mmol/L


(21-32) 26 mmol/L


(21-32) 28 mmol/L


(21-32)


 


Anion Gap  6 (6-14)  7 (6-14)  5 (6-14) 


 


Blood Urea Nitrogen


 


 41 mg/dL


(7-20) 38 mg/dL


(7-20) 36 mg/dL


(7-20)


 


Creatinine


 


 1.4 mg/dL


(0.6-1.0) 1.3 mg/dL


(0.6-1.0) 1.3 mg/dL


(0.6-1.0)


 


Estimated GFR


(Cockcroft-Gault) 


 40.0 


 43.5 


 43.5 





 


Glucose Level


 


 233 mg/dL


(70-99) 217 mg/dL


(70-99) 202 mg/dL


(70-99)


 


Calcium Level


 


 8.0 mg/dL


(8.5-10.1) 8.1 mg/dL


(8.5-10.1) 8.2 mg/dL


(8.5-10.1)


 


Phosphorus Level


 


 3.6 mg/dL


(2.6-4.7) 3.3 mg/dL


(2.6-4.7) 3.5 mg/dL


(2.6-4.7)


 


Magnesium Level


 


 2.1 mg/dL


(1.8-2.4) 2.3 mg/dL


(1.8-2.4) 2.3 mg/dL


(1.8-2.4)


 


White Blood Count


 


 


 


 38.7 x10^3/uL


(4.0-11.0)


 


Red Blood Count


 


 


 


 2.10 x10^6/uL


(3.50-5.40)


 


Hemoglobin


 


 


 


 6.8 g/dL


(12.0-15.5)


 


Hematocrit


 


 


 


 20.7 %


(36.0-47.0)


 


Mean Corpuscular Volume    99 fL () 


 


Mean Corpuscular Hemoglobin    32 pg (25-35) 


 


Mean Corpuscular Hemoglobin


Concent 


 


 


 33 g/dL


(31-37)


 


Red Cell Distribution Width


 


 


 


 15.7 %


(11.5-14.5)


 


Platelet Count


 


 


 


 319 x10^3/uL


(140-400)


 


Neutrophils (%) (Auto)    90 % (31-73) 


 


Lymphocytes (%) (Auto)    5 % (24-48) 


 


Monocytes (%) (Auto)    4 % (0-9) 


 


Eosinophils (%) (Auto)    1 % (0-3) 


 


Basophils (%) (Auto)    1 % (0-3) 


 


Neutrophils # (Auto)


 


 


 


 34.7 x10^3/uL


(1.8-7.7)


 


Lymphocytes # (Auto)


 


 


 


 1.9 x10^3/uL


(1.0-4.8)


 


Monocytes # (Auto)


 


 


 


 1.4 x10^3/uL


(0.0-1.1)


 


Eosinophils # (Auto)


 


 


 


 0.5 x10^3/uL


(0.0-0.7)


 


Basophils # (Auto)


 


 


 


 0.2 x10^3/uL


(0.0-0.2)


 


Test


 3/29/20


05:41 3/29/20


12:10 3/29/20


13:00 





 


Glucose (Fingerstick)


 189 mg/dL


(70-99) 204 mg/dL


(70-99) 


 





 


Sodium Level


 


 


 138 mmol/L


(136-145) 





 


Potassium Level


 


 


 4.4 mmol/L


(3.5-5.1) 





 


Chloride Level


 


 


 103 mmol/L


() 





 


Carbon Dioxide Level


 


 


 27 mmol/L


(21-32) 





 


Anion Gap   8 (6-14)  


 


Blood Urea Nitrogen


 


 


 34 mg/dL


(7-20) 





 


Creatinine


 


 


 1.2 mg/dL


(0.6-1.0) 





 


Estimated GFR


(Cockcroft-Gault) 


 


 47.7 


 





 


Glucose Level


 


 


 210 mg/dL


(70-99) 





 


Calcium Level


 


 


 8.2 mg/dL


(8.5-10.1) 





 


Phosphorus Level


 


 


 2.6 mg/dL


(2.6-4.7) 





 


Magnesium Level


 


 


 2.3 mg/dL


(1.8-2.4) 











Microbiology


3/24/20 Blood Culture - Final, Complete


          NO GROWTH AFTER 5 DAYS





Medication


Medications





Current Medications


Info ([facility] Electrolyte Protocol) 1 ea CONT PRN  PRN MC PER PROTOCOL;  Start 

3/29/20 at 13:15


Midazolam HCl 100 mg/Sodium Chloride 100 ml @ 7 mls/hr CONT  PRN IV SEE PROTOCOL

Last administered on 3/29/20at 12:28;  Start 3/28/20 at 16:00


Sodium Chloride 90 meq/Potassium Chloride 15 meq/ Potassium Phosphate 18 mmol/ 

Magnesium Sulfate 8 meq/Calcium Gluconate 15 meq/ Multivitamins 10 ml/Chromium/ 

Copper/Manganese/ Seleni/Zn 0.5 ml/ Insulin Human Regular 15 unit/ Total 

Parenteral Nutrition/Amino Acids/Dextrose/ Fat Emulsion Intravenous 1,400 ml @  

58.333 mls/ hr TPN  CONT IV  Last administered on 3/28/20at 20:34;  Start 3/2

8/20 at 22:00;  Stop 3/29/20 at 21:59


Sodium Chloride 90 meq/Potassium Chloride 15 meq/ Potassium Phosphate 18 mmol/ 

Magnesium Sulfate 8 meq/Calcium Gluconate 15 meq/ Multivitamins 10 ml/Chromium/ 

Copper/Manganese/ Seleni/Zn 0.5 ml/ Insulin Human Regular 15 unit/ Total 

Parenteral Nutrition/Amino Acids/Dextrose/ Fat Emulsion Intravenous 1,400 ml @  

58.333 mls/ hr TPN  CONT IV ;  Start 3/29/20 at 22:00;  Stop 3/30/20 at 21:59





Comment


Review of Relevant


I have reviewed the following items josy (where applicable) has been applied.











DORYS LANDA MD                 Mar 29, 2020 15:35

## 2020-03-29 NOTE — NUR
Pharmacy TPN Dosing Note



S: JESENIA RICH is a 49 year old F Currently receiving Central Continuous TPN started 
03/18/20



B:Pertinent PMH: 

Necrotizing pancreatitis

Height: 5 feet, 8 inches

Weight: 111.2 kg



Current diet: NPO 



LABS:

Sodium:    138 

Potassium: 4.4 

Chloride:  105 

Calcium:   8.2 

Corrected Calcium: 9.88 

Magnesium: 2.3 

CO2:       27 

SCr:       1.2 

Glucose:   189-204 

Albumin:   1.9 

AST:       37 

ALT:       16 



TPN FORMULA:

TPN TYPE:  Central Continuous

AMINO ACIDS:         125 gm

DEXTROSE:            195 gm

LIPIDS:              40 gm

SODIUM CHLORIDE:     90 mEq

SODIUM ACETATE:      -- mEq

SODIUM PHOSPHATE:    -- mmol

POTASSIUM CHLORIDE:  15 mEq

POTASSIUM ACETATE:   -- mEq

POTASSIUM PHOSPHATE: 18 mmol

MAGNESIUM:           8 mEq

CALCIUM:             15 mEq

INSULIN:             15 units

MULTIPLE VITAMIN:    10 ml

TRACE ELEMENTS:      0.5 ml(s)



TPN PLAN:  

Labs WNL; BG still high but improved. Likely to change to IHD tomorrow.

No change.

Labs per nephrology for CRRT monitoring.





R: Continue TPN AS ABOVE.

Will monitor electrolytes, glucose, and tolerance to TPN.



 CANDACE BELTRE [facility], 03/29/20 [id number]

## 2020-03-29 NOTE — RAD
CHEST AP ONLY

 

History: Ventilated patient. Respiratory failure.

 

Comparison: March 28, 2020

 

Findings:

Diffuse interstitial and alveolar opacities. Low lung volumes. Small 

bilateral layering pleural effusions, unchanged. Stable endotracheal tube,

enteric tube, right IJ central line, left-sided central line and right 

PICC.

 

Impression: 

1.  No significant interval change compared to prior.

 

Electronically signed by: Torrey Coyle DO (3/29/2020 9:10 AM) [id number]

## 2020-03-29 NOTE — PDOC
SUBJECTIVE


ROS


Intubated , stable, uop improved some





OBJECTIVE


Vital Signs





Vital Signs








  Date Time  Temp Pulse Resp B/P (MAP) Pulse Ox O2 Delivery O2 Flow Rate FiO2


 


3/29/20 11:37     97 Ventilator  


 


3/29/20 11:00 100.0 127 20 141/82 (101)    





 100.0       








I & 0











Intake and Output 


 


 3/29/20





 07:00


 


Intake Total 1682.8 ml


 


Output Total 415 ml


 


Balance 1267.8 ml


 


 


 


IV Total 1682.8 ml


 


Output Urine Total 415 ml











PHYSICAL EXAM


Physical Exam


GENERAL: Intubated on MV 


HEENT: Mild icterus, Intubated  


NECK:  Supple. 


LUNGS:  Decreased breath sounds at the bases.


HEART:  S1, S2, tachycardia, 110s,


ABDOMEN:  Distended, + BS. Rectal tube in place


: [place]  +


EXTREMITIES: Trace edema, no cyanosis - mottled.


DERMATOLOGIC:  Warm and dry.  No generalized rash.  


CENTRAL NERVOUS SYSTEM: Intubated  on MV





DIAGNOSIS/ASSESSMENT


Assessment & Plan


JED - ATN, , requiring  RRT, 


 intubated on 3/23 , currently on  pressors


initially on HD , Switched to CRRT 3/23 seen on CRRT, tolerating well (Temp HDC 

replaced today 2/2 nonfunctional ) 


Tolerating UF, plan to switch to HD tomorrow if stable  


Discussed with RN and Robert  





HyperKalemia- normal  





 Acidosis -  resolved  


Currently on  TPN 





Anemia- drop in Hgb, per primary   





HypoCalcemia- Corrected Ca normal  





Hypoalbuminemia- severe  





HypOPhos -Replace as needed  





 Acute pancreatitis - with necrosis/infection, likely gallstone pancreatitis. 

GI, ID, and general surgery  following  





Calculus of gallbladder with acute cholecystitis without obstruction - with 

secondary pancreatitis. Lap naif is indicated, will need to await pancreatitis 

resolution





HTN - Hypotensive, low dose  pressor  





Sepsis - with acute pancreatitis as end organ dysfunction, sign of infection, 

zyvox, merrem





Hypoxia with respiratory failure - intubated





COMMENT/RELEVANT DATA


Meds





Current Medications








 Medications


  (Trade)  Dose


 Ordered  Sig/Yee  Start Time


 Stop Time Status Last Admin


Dose Admin


 


 Acetaminophen


  (Tylenol Supp)  650 mg  PRN Q6HRS  PRN  3/24/20 10:30


    3/24/20 10:37


650 MG


 


 Acetaminophen


  (Tylenol)  650 mg  PRN Q6HRS  PRN  3/21/20 03:36


    3/26/20 07:35


650 MG


 


 Albumin Human  500 ml @ 


 125 mls/hr  1X  ONCE  3/26/20 14:15


 3/26/20 18:14 DC  





 


 Albuterol Sulfate


  (Ventolin Neb


 Soln)  2.5 mg  1X  ONCE  3/17/20 22:30


 3/17/20 22:31 DC 3/18/20 00:56


2.5 MG


 


 Artificial Tears


  (Artificial


 Tears)  1 drop  PRN Q1HR  PRN  3/23/20 08:15


     





 


 Atenolol


  (Tenormin)  100 mg  DAILY  3/17/20 09:00


 3/16/20 20:08 DC  





 


 Benzocaine


  (Hurricaine One)  1 spray  1X  ONCE  3/20/20 14:30


 3/20/20 14:31 DC 3/20/20 16:38


1 SPRAY


 


 Calcium Carbonate/


 Glycine


  (Tums)  500 mg  PRN [id number]  PRN  3/18/20 17:45


     





 


 Calcium Chloride


 1000 mg/Sodium


 Chloride  110 ml @ 


 220 mls/hr  1X  ONCE  3/17/20 22:30


 3/17/20 22:59 DC 3/17/20 22:11


220 MLS/HR


 


 Calcium Chloride


 3000 mg/Sodium


 Chloride  1,030 ml @ 


 50 mls/hr  [id number]  3/19/20 08:00


 3/21/20 15:23 DC 3/21/20 02:17


50 MLS/HR


 


 Calcium Gluconate


  (Calcium


 Gluconate)  2,000 mg  1X  ONCE  3/19/20 02:15


 3/19/20 02:16 DC 3/19/20 02:19


2,000 MG


 


 Calcium Gluconate


 1000 mg/Sodium


 Chloride  110 ml @ 


 220 mls/hr  1X  ONCE  3/18/20 03:30


 3/18/20 03:59 DC 3/18/20 03:21


220 MLS/HR


 


 Calcium Gluconate


 2000 mg/Sodium


 Chloride  120 ml @ 


 220 mls/hr  1X  ONCE  3/18/20 07:30


 3/18/20 08:02 DC 3/18/20 09:05


220 MLS/HR


 


 Cefepime HCl


  (Maxipime)  2 gm  Q12HR  3/25/20 09:00


    3/29/20 09:42


2 GM


 


 Daptomycin 500 mg/


 Sodium Chloride  50 ml @ 


 100 mls/hr  Q48H  3/25/20 08:30


    3/29/20 08:10


100 MLS/HR


 


 Dextrose


  (Dextrose


 50%-Water Syringe)  12.5 gm  PRN Q15MIN  PRN  3/16/20 09:30


     





 


 Digoxin


  (Lanoxin)  125 mcg  1X  ONCE  3/19/20 18:00


 3/19/20 18:01 DC 3/19/20 17:10


125 MCG


 


 Diphenhydramine


 HCl


  (Benadryl)  25 mg  1X PRN  PRN  3/23/20 07:30


 3/24/20 07:29 DC  





 


 Etomidate


  (Amidate)  8 mg  1X  ONCE  3/23/20 08:30


 3/23/20 08:31 DC 3/23/20 08:33


8 MG


 


 Fentanyl Citrate  30 ml @ 0


 mls/hr  CONT  PRN  3/23/20 08:15


    3/29/20 08:12


2.5 MLS/HR


 


 Fentanyl Citrate


  (Fentanyl 2ml


 Vial)  100 mcg  STK-MED ONCE  3/16/20 03:18


 3/16/20 03:18 DC  





 


 Furosemide


  (Lasix)  40 mg  1X  ONCE  3/17/20 22:30


 3/17/20 22:31 DC 3/17/20 22:12


40 MG


 


 Heparin Sodium


  (Porcine)


  (Heparin Sodium)  5,000 unit  Q8HRS  3/23/20 15:00


 3/28/20 13:28 DC 3/28/20 05:55


5,000 UNIT


 


 Hydromorphone HCl


  (Dilaudid)  1 mg  PRN Q3HRS  PRN  3/17/20 12:00


    3/23/20 05:13


1 MG


 


 Info


  (CONTRAST GIVEN


 -- Rx MONITORING)  1 each  PRN DAILY  PRN  3/17/20 17:00


 3/19/20 16:59 DC  





 


 Info


  (PHARMACY


 MONITORING -- do


 not chart)  1 each  PRN DAILY  PRN  3/23/20 07:30


     





 


 Info


  (Tpn Per


 Pharmacy)  1 each  PRN DAILY  PRN  3/18/20 12:30


   UNV  





 


 Insulin Human


 Lispro


  (HumaLOG)  0-9 UNITS  Q6HRS  3/16/20 09:30


    3/29/20 06:06


4 UNITS


 


 Insulin Human


 Regular


  (HumuLIN R VIAL)  5 unit  1X  ONCE  3/17/20 22:30


 3/17/20 22:31 DC 3/17/20 22:14


5 UNIT


 


 Iohexol


  (Omnipaque 300


 Mg/ml)  60 ml  1X  ONCE  3/17/20 17:00


 3/17/20 17:01 DC 3/17/20 17:20


60 ML


 


 Iohexol


  (Omnipaque 350


 Mg/ml)  90 ml  1X  ONCE  3/16/20 03:30


 3/16/20 03:31 DC 3/16/20 03:25


90 ML


 


 Ketorolac


 Tromethamine


  (Toradol 30mg


 Vial)  30 mg  1X  ONCE  3/16/20 03:00


 3/16/20 03:01 DC 3/16/20 02:54


30 MG


 


 Lidocaine HCl


  (Buffered


 Lidocaine 1%)  3 ml  STK-MED ONCE  3/25/20 10:00


 3/27/20 13:57 DC  





 


 Lidocaine HCl


  (Glydo


  (Lidocaine) Jelly)  1 thomas  1X  ONCE  3/20/20 14:30


 3/20/20 14:31 DC 3/20/20 16:38


1 THOMAS


 


 Lidocaine HCl


  (Xylocaine-Mpf


 1% 2ml Vial)  2 ml  STK-MED ONCE  3/18/20 08:47


 3/18/20 08:47 DC  





 


 Linezolid/Dextrose  300 ml @ 


 300 mls/hr  Q12HR  3/20/20 20:00


 3/27/20 07:50 DC 3/26/20 21:04


300 MLS/HR


 


 Lorazepam


  (Ativan Inj)  1 mg  PRN Q4HRS  PRN  3/19/20 09:00


    3/23/20 00:34


1 MG


 


 Magnesium Sulfate  100 ml @ 


 25 mls/hr  1X  ONCE  3/19/20 13:00


 3/19/20 16:59 DC 3/19/20 12:48


25 MLS/HR


 


 Meropenem 1 gm/


 Sodium Chloride  100 ml @ 


 200 mls/hr  Q8HRS  3/17/20 20:00


 3/18/20 08:48 DC 3/18/20 05:45


200 MLS/HR


 


 Meropenem 500 mg/


 Sodium Chloride  50 ml @ 


 100 mls/hr  Q6HRS  3/24/20 09:00


 3/25/20 07:29 DC 3/25/20 06:00


100 MLS/HR


 


 Metoprolol


 Tartrate


  (Lopressor Vial)  5 mg  Q6HRS  3/17/20 10:15


 3/28/20 08:48 DC 3/26/20 00:12


5 MG


 


 Metronidazole  100 ml @ 


 100 mls/hr  Q6HRS  3/23/20 08:30


    3/29/20 11:27


100 MLS/HR


 


 Micafungin Sodium


 100 mg/Dextrose  100 ml @ 


 100 mls/hr  Q24H  3/23/20 09:00


    3/29/20 09:41


100 MLS/HR


 


 Midazolam HCl


  (Versed)  5 mg  1X  ONCE  3/23/20 08:30


 3/23/20 08:31 DC  





 


 Midazolam HCl 100


 mg/Sodium Chloride  100 ml @ 7


 mls/hr  CONT  PRN  3/28/20 16:00


     





 


 Midazolam HCl 50


 mg/Sodium Chloride  50 ml @ 0


 mls/hr  CONT  PRN  3/23/20 08:15


 3/28/20 15:59 DC 3/26/20 22:39


7 MLS/HR


 


 Morphine Sulfate


  (Morphine


 Sulfate)  2 mg  PRN Q2HR  PRN  3/16/20 05:00


 3/17/20 14:15 DC 3/17/20 12:26


2 MG


 


 Multi-Ingred


 Cream/Lotion/Oil/


 Oint


  (Artificial


 Tears Eye


 Ointment)  1 thomas  PRN Q1HR  PRN  3/25/20 17:30


     





 


 Norepinephrine


 Bitartrate 8 mg/


 Dextrose  258 ml @ 


 17.299 mls/


 hr  CONT  PRN  3/17/20 15:30


    3/29/20 03:38


12.109 MLS/HR


 


 Ondansetron HCl


  (Zofran)  4 mg  PRN Q4HRS  PRN  3/16/20 09:30


    3/21/20 16:15


4 MG


 


 Pantoprazole


 Sodium


  (PROTONIX VIAL


 for IV PUSH)  40 mg  DAILYAC  3/16/20 11:30


    3/29/20 08:19


40 MG


 


 Piperacillin Sod/


 Tazobactam Sod


 4.5 gm/Sodium


 Chloride  100 ml @ 


 200 mls/hr  1X  ONCE  3/16/20 06:00


 3/16/20 06:29 DC 3/16/20 05:44


200 MLS/HR


 


 Potassium


 Chloride 15 meq/


 Bicarbonate


 Dialysis Soln w/


 out KCl  5,007.5 ml


  @ 1,000 mls/


 hr  Q5H1M  3/23/20 12:00


 3/24/20 11:19 DC 3/24/20 11:11


1,000 MLS/HR


 


 Potassium


 Chloride 20 meq/


 Bicarbonate


 Dialysis Soln w/


 out KCl  5,010 ml @ 


 1,000 mls/hr  Q5H1M  3/25/20 16:00


    3/29/20 05:28


1,000 MLS/HR


 


 Potassium


 Chloride/Water  100 ml @ 


 100 mls/hr  Q1H  3/24/20 11:00


 3/24/20 12:59 DC 3/24/20 12:12


100 MLS/HR


 


 Potassium


 Phosphate 20 mmol/


 Sodium Chloride  106.6667


 ml @ 


 51.667 m...  1X  ONCE  3/25/20 13:00


 3/25/20 15:03 DC 3/25/20 12:51


51.667 MLS/HR


 


 Prochlorperazine


 Edisylate


  (Compazine)  10 mg  PRN Q6HRS  PRN  3/16/20 17:45


    3/17/20 00:42


10 MG


 


 Propofol  0 ml @ As


 Directed  STK-MED ONCE  3/23/20 07:53


 3/23/20 07:53 DC  





 


 Sodium


 Bicarbonate 50


 meq/Sodium


 Chloride  1,050 ml @ 


 75 mls/hr  Q14H  3/18/20 07:30


 3/23/20 10:28 DC 3/22/20 21:10


75 MLS/HR


 


 Sodium Chloride


  (Normal Saline


 Flush)  10 ml  1X PRN  PRN  3/21/20 08:00


 3/22/20 07:59 DC  





 


 Sodium Chloride


 90 meq/Calcium


 Gluconate 10 meq/


 Multivitamins 10


 ml/Chromium/


 Copper/Manganese/


 Seleni/Zn 0.5 ml/


 Total Parenteral


 Nutrition/Amino


 Acids/Dextrose/


 Fat Emulsion


 Intravenous  1,512 ml @ 


 63 mls/hr  TPN  CONT  3/18/20 22:00


 3/19/20 21:59 DC 3/18/20 22:06


63 MLS/HR


 


 Sodium Chloride


 90 meq/Calcium


 Gluconate 10 meq/


 Multivitamins 10


 ml/Chromium/


 Copper/Manganese/


 Seleni/Zn 1 ml/


 Total Parenteral


 Nutrition/Amino


 Acids/Dextrose/


 Fat Emulsion


 Intravenous  55.005 ml


  @ 2.292


 mls/hr  TPN  CONT  3/18/20 22:00


 3/18/20 12:33 DC  





 


 Sodium Chloride


 90 meq/Magnesium


 Sulfate 10 meq/


 Calcium Gluconate


 20 meq/


 Multivitamins 10


 ml/Chromium/


 Copper/Manganese/


 Seleni/Zn 0.5 ml/


 Total Parenteral


 Nutrition/Amino


 Acids/Dextrose/


 Fat Emulsion


 Intravenous  1,512 ml @ 


 63 mls/hr  TPN  CONT  3/19/20 22:00


 3/20/20 21:59 DC 3/19/20 22:25


63 MLS/HR


 


 Sodium Chloride


 90 meq/Potassium


 Chloride 15 meq/


 Potassium


 Phosphate 10 mmol/


 Magnesium Sulfate


 10 meq/Calcium


 Gluconate 20 meq/


 Multivitamins 10


 ml/Chromium/


 Copper/Manganese/


 Seleni/Zn 0.5 ml/


 Total Parenteral


 Nutrition/Amino


 Acids/Dextrose/


 Fat Emulsion


 Intravenous  1,400 ml @ 


 58.333 mls/


 hr  TPN  CONT  3/23/20 22:00


 3/24/20 21:59 DC 3/23/20 21:42


58.333 MLS/HR


 


 Sodium Chloride


 90 meq/Potassium


 Chloride 15 meq/


 Potassium


 Phosphate 15 mmol/


 Magnesium Sulfate


 10 meq/Calcium


 Gluconate 15 meq/


 Multivitamins 10


 ml/Chromium/


 Copper/Manganese/


 Seleni/Zn 0.5 ml/


 Total Parenteral


 Nutrition/Amino


 Acids/Dextrose/


 Fat Emulsion


 Intravenous  1,400 ml @ 


 58.333 mls/


 hr  TPN  CONT  3/24/20 22:00


 3/25/20 21:59 DC 3/24/20 22:17


58.333 MLS/HR


 


 Sodium Chloride


 90 meq/Potassium


 Chloride 15 meq/


 Potassium


 Phosphate 15 mmol/


 Magnesium Sulfate


 10 meq/Calcium


 Gluconate 20 meq/


 Multivitamins 10


 ml/Chromium/


 Copper/Manganese/


 Seleni/Zn 0.5 ml/


 Total Parenteral


 Nutrition/Amino


 Acids/Dextrose/


 Fat Emulsion


 Intravenous  1,200 ml @ 


 50 mls/hr  TPN  CONT  3/22/20 22:00


 3/22/20 14:17 DC  





 


 Sodium Chloride


 90 meq/Potassium


 Chloride 15 meq/


 Potassium


 Phosphate 18 mmol/


 Magnesium Sulfate


 8 meq/Calcium


 Gluconate 15 meq/


 Multivitamins 10


 ml/Chromium/


 Copper/Manganese/


 Seleni/Zn 0.5 ml/


 Insulin Human


 Regular 10 unit/


 Total Parenteral


 Nutrition/Amino


 Acids/Dextrose/


 Fat Emulsion


 Intravenous  1,400 ml @ 


 58.333 mls/


 hr  TPN  CONT  3/27/20 22:00


 3/28/20 21:59 DC 3/27/20 21:43


58.333 MLS/HR


 


 Sodium Chloride


 90 meq/Potassium


 Chloride 15 meq/


 Potassium


 Phosphate 18 mmol/


 Magnesium Sulfate


 8 meq/Calcium


 Gluconate 15 meq/


 Multivitamins 10


 ml/Chromium/


 Copper/Manganese/


 Seleni/Zn 0.5 ml/


 Insulin Human


 Regular 15 unit/


 Total Parenteral


 Nutrition/Amino


 Acids/Dextrose/


 Fat Emulsion


 Intravenous  1,400 ml @ 


 58.333 mls/


 hr  TPN  CONT  3/28/20 22:00


 3/29/20 21:59  3/28/20 20:34


58.333 MLS/HR


 


 Sodium Chloride


 90 meq/Potassium


 Chloride 15 meq/


 Potassium


 Phosphate 18 mmol/


 Magnesium Sulfate


 8 meq/Calcium


 Gluconate 15 meq/


 Multivitamins 10


 ml/Chromium/


 Copper/Manganese/


 Seleni/Zn 0.5 ml/


 Total Parenteral


 Nutrition/Amino


 Acids/Dextrose/


 Fat Emulsion


 Intravenous  1,400 ml @ 


 58.333 mls/


 hr  TPN  CONT  3/26/20 22:00


 3/27/20 21:59 DC 3/26/20 22:00


58.333 MLS/HR


 


 Succinylcholine


 Chloride


  (Anectine)  120 mg  1X  ONCE  3/23/20 08:30


 3/23/20 08:31 DC 3/23/20 08:34


120 MG








Lab





Laboratory Tests








Test


 3/28/20


17:47 3/28/20


17:48 3/28/20


23:30 3/29/20


05:40


 


Glucose (Fingerstick)


 231 mg/dL


(70-99) 


 199 mg/dL


(70-99) 





 


Sodium Level


 


 138 mmol/L


(136-145) 138 mmol/L


(136-145) 138 mmol/L


(136-145)


 


Potassium Level


 


 4.6 mmol/L


(3.5-5.1) 4.3 mmol/L


(3.5-5.1) 4.6 mmol/L


(3.5-5.1)


 


Chloride Level


 


 105 mmol/L


() 105 mmol/L


() 105 mmol/L


()


 


Carbon Dioxide Level


 


 27 mmol/L


(21-32) 26 mmol/L


(21-32) 28 mmol/L


(21-32)


 


Anion Gap  6 (6-14)  7 (6-14)  5 (6-14) 


 


Blood Urea Nitrogen


 


 41 mg/dL


(7-20) 38 mg/dL


(7-20) 36 mg/dL


(7-20)


 


Creatinine


 


 1.4 mg/dL


(0.6-1.0) 1.3 mg/dL


(0.6-1.0) 1.3 mg/dL


(0.6-1.0)


 


Estimated GFR


(Cockcroft-Gault) 


 40.0 


 43.5 


 43.5 





 


Glucose Level


 


 233 mg/dL


(70-99) 217 mg/dL


(70-99) 202 mg/dL


(70-99)


 


Calcium Level


 


 8.0 mg/dL


(8.5-10.1) 8.1 mg/dL


(8.5-10.1) 8.2 mg/dL


(8.5-10.1)


 


Phosphorus Level


 


 3.6 mg/dL


(2.6-4.7) 3.3 mg/dL


(2.6-4.7) 3.5 mg/dL


(2.6-4.7)


 


Magnesium Level


 


 2.1 mg/dL


(1.8-2.4) 2.3 mg/dL


(1.8-2.4) 2.3 mg/dL


(1.8-2.4)


 


White Blood Count


 


 


 


 38.7 x10^3/uL


(4.0-11.0)


 


Red Blood Count


 


 


 


 2.10 x10^6/uL


(3.50-5.40)


 


Hemoglobin


 


 


 


 6.8 g/dL


(12.0-15.5)


 


Hematocrit


 


 


 


 20.7 %


(36.0-47.0)


 


Mean Corpuscular Volume    99 fL () 


 


Mean Corpuscular Hemoglobin    32 pg (25-35) 


 


Mean Corpuscular Hemoglobin


Concent 


 


 


 33 g/dL


(31-37)


 


Red Cell Distribution Width


 


 


 


 15.7 %


(11.5-14.5)


 


Platelet Count


 


 


 


 319 x10^3/uL


(140-400)


 


Neutrophils (%) (Auto)    90 % (31-73) 


 


Lymphocytes (%) (Auto)    5 % (24-48) 


 


Monocytes (%) (Auto)    4 % (0-9) 


 


Eosinophils (%) (Auto)    1 % (0-3) 


 


Basophils (%) (Auto)    1 % (0-3) 


 


Neutrophils # (Auto)


 


 


 


 34.7 x10^3/uL


(1.8-7.7)


 


Lymphocytes # (Auto)


 


 


 


 1.9 x10^3/uL


(1.0-4.8)


 


Monocytes # (Auto)


 


 


 


 1.4 x10^3/uL


(0.0-1.1)


 


Eosinophils # (Auto)


 


 


 


 0.5 x10^3/uL


(0.0-0.7)


 


Basophils # (Auto)


 


 


 


 0.2 x10^3/uL


(0.0-0.2)


 


Test


 3/29/20


05:41 


 


 





 


Glucose (Fingerstick)


 189 mg/dL


(70-99) 


 


 











Results


All relevant outside records, renal labs, imaging studies, telemetry/EKG's were 

reviewed.











KIMBERLY MYERS MD                Mar 29, 2020 11:54

## 2020-03-29 NOTE — PDOC
PULMONARY PROGRESS NOTES


Subjective


No significant change from yesterday


Patient intubated on 3/23 , sedated


Currently on assist control ventilation rate of 25 450 tidal volume 8 of PEEP 

currently undergoing CRRT


Vitals





Vital Signs








  Date Time  Temp Pulse Resp B/P (MAP) Pulse Ox O2 Delivery O2 Flow Rate FiO2


 


3/29/20 08:12     96 Ventilator  


 


3/29/20 06:00  112 25 103/53 (70)    


 


3/29/20 04:00 98.4       





 98.4       








HEENT:  Other (nc at perrl   bipap mask on   neck no lad   no thyromegaly)


Lungs:  Crackles


Cardiovascular:  S1, S2


Abdomen:  Other (distended,  diffuse pain   no mass)


Extremities:  No Edema


Skin:  Warm


Labs





Laboratory Tests








Test


 3/27/20


09:15 3/27/20


12:15 3/27/20


17:45 3/28/20


00:15


 


Sodium Level


 138 mmol/L


(136-145) 138 mmol/L


(136-145) 139 mmol/L


(136-145) 139 mmol/L


(136-145)


 


Potassium Level


 4.3 mmol/L


(3.5-5.1) 4.4 mmol/L


(3.5-5.1)


 


Chloride Level


 105 mmol/L


() 104 mmol/L


() 105 mmol/L


() 106 mmol/L


()


 


Carbon Dioxide Level


 24 mmol/L


(21-32) 24 mmol/L


(21-32) 27 mmol/L


(21-32) 26 mmol/L


(21-32)


 


Anion Gap 9 (6-14)  10 (6-14)  7 (6-14)  7 (6-14) 


 


Blood Urea Nitrogen


 44 mg/dL


(7-20) 45 mg/dL


(7-20) 41 mg/dL


(7-20) 43 mg/dL


(7-20)


 


Creatinine


 1.7 mg/dL


(0.6-1.0) 1.8 mg/dL


(0.6-1.0) 1.5 mg/dL


(0.6-1.0) 1.5 mg/dL


(0.6-1.0)


 


Estimated GFR


(Cockcroft-Gault) 31.9 


 29.9 


 36.9 


 36.9 





 


Glucose Level


 250 mg/dL


(70-99) 261 mg/dL


(70-99) 259 mg/dL


(70-99) 258 mg/dL


(70-99)


 


Calcium Level


 7.8 mg/dL


(8.5-10.1) 7.9 mg/dL


(8.5-10.1) 7.8 mg/dL


(8.5-10.1) 7.9 mg/dL


(8.5-10.1)


 


Magnesium Level


 2.2 mg/dL


(1.8-2.4) 2.1 mg/dL


(1.8-2.4) 2.2 mg/dL


(1.8-2.4) 2.2 mg/dL


(1.8-2.4)


 


Phosphorus Level


 


 3.8 mg/dL


(2.6-4.7) 


 3.7 mg/dL


(2.6-4.7)


 


Test


 3/28/20


00:36 3/28/20


05:40 3/28/20


05:49 3/28/20


08:00


 


Glucose (Fingerstick)


 251 mg/dL


(70-99) 


 195 mg/dL


(70-99) 





 


White Blood Count


 


 38.1 x10^3/uL


(4.0-11.0) 


 





 


Red Blood Count


 


 2.38 x10^6/uL


(3.50-5.40) 


 





 


Hemoglobin


 


 7.6 g/dL


(12.0-15.5) 


 





 


Hematocrit


 


 23.4 %


(36.0-47.0) 


 





 


Mean Corpuscular Volume  98 fL ()   


 


Mean Corpuscular Hemoglobin  32 pg (25-35)   


 


Mean Corpuscular Hemoglobin


Concent 


 33 g/dL


(31-37) 


 





 


Red Cell Distribution Width


 


 15.7 %


(11.5-14.5) 


 





 


Platelet Count


 


 308 x10^3/uL


(140-400) 


 





 


Neutrophils (%) (Auto)  91 % (31-73)   


 


Lymphocytes (%) (Auto)  5 % (24-48)   


 


Monocytes (%) (Auto)  3 % (0-9)   


 


Eosinophils (%) (Auto)  1 % (0-3)   


 


Basophils (%) (Auto)  0 % (0-3)   


 


Neutrophils # (Auto)


 


 34.5 x10^3/uL


(1.8-7.7) 


 





 


Lymphocytes # (Auto)


 


 1.9 x10^3/uL


(1.0-4.8) 


 





 


Monocytes # (Auto)


 


 1.2 x10^3/uL


(0.0-1.1) 


 





 


Eosinophils # (Auto)


 


 0.4 x10^3/uL


(0.0-0.7) 


 





 


Basophils # (Auto)


 


 0.1 x10^3/uL


(0.0-0.2) 


 





 


Sodium Level


 


 139 mmol/L


(136-145) 


 





 


Potassium Level


 


 4.3 mmol/L


(3.5-5.1) 


 





 


Chloride Level


 


 105 mmol/L


() 


 





 


Carbon Dioxide Level


 


 26 mmol/L


(21-32) 


 





 


Anion Gap  8 (6-14)   


 


Blood Urea Nitrogen


 


 38 mg/dL


(7-20) 


 





 


Creatinine


 


 1.4 mg/dL


(0.6-1.0) 


 





 


Estimated GFR


(Cockcroft-Gault) 


 40.0 


 


 





 


Glucose Level


 


 210 mg/dL


(70-99) 


 





 


Calcium Level


 


 8.2 mg/dL


(8.5-10.1) 


 





 


Phosphorus Level


 


 3.7 mg/dL


(2.6-4.7) 


 





 


Magnesium Level


 


 2.3 mg/dL


(1.8-2.4) 


 





 


O2 Saturation    93 % (92-99) 


 


Arterial Blood pH


 


 


 


 7.34


(7.35-7.45)


 


Arterial Blood pCO2 at


Patient Temp 


 


 


 43 mmHg


(35-46)


 


Arterial Blood pO2 at Patient


Temp 


 


 


 79 mmHg


()


 


Arterial Blood HCO3


 


 


 


 23 mmol/L


(21-28)


 


Arterial Blood Base Excess


 


 


 


 -3 mmol/L


(-3-3)


 


FiO2    30% vent 


 


Test


 3/28/20


11:30 3/28/20


11:45 3/28/20


17:47 3/28/20


17:48


 


Sodium Level


 139 mmol/L


(136-145) 


 


 138 mmol/L


(136-145)


 


Potassium Level


 4.4 mmol/L


(3.5-5.1) 


 


 4.6 mmol/L


(3.5-5.1)


 


Chloride Level


 105 mmol/L


() 


 


 105 mmol/L


()


 


Carbon Dioxide Level


 28 mmol/L


(21-32) 


 


 27 mmol/L


(21-32)


 


Anion Gap 6 (6-14)    6 (6-14) 


 


Blood Urea Nitrogen


 38 mg/dL


(7-20) 


 


 41 mg/dL


(7-20)


 


Creatinine


 1.3 mg/dL


(0.6-1.0) 


 


 1.4 mg/dL


(0.6-1.0)


 


Estimated GFR


(Cockcroft-Gault) 43.5 


 


 


 40.0 





 


Glucose Level


 235 mg/dL


(70-99) 


 


 233 mg/dL


(70-99)


 


Calcium Level


 8.1 mg/dL


(8.5-10.1) 


 


 8.0 mg/dL


(8.5-10.1)


 


Phosphorus Level


 3.3 mg/dL


(2.6-4.7) 


 


 3.6 mg/dL


(2.6-4.7)


 


Magnesium Level


 2.2 mg/dL


(1.8-2.4) 


 


 2.1 mg/dL


(1.8-2.4)


 


Glucose (Fingerstick)


 


 231 mg/dL


(70-99) 231 mg/dL


(70-99) 





 


Test


 3/28/20


23:30 3/29/20


05:40 3/29/20


05:41 





 


Sodium Level


 138 mmol/L


(136-145) 138 mmol/L


(136-145) 


 





 


Potassium Level


 4.3 mmol/L


(3.5-5.1) 4.6 mmol/L


(3.5-5.1) 


 





 


Chloride Level


 105 mmol/L


() 105 mmol/L


() 


 





 


Carbon Dioxide Level


 26 mmol/L


(21-32) 28 mmol/L


(21-32) 


 





 


Anion Gap 7 (6-14)  5 (6-14)   


 


Blood Urea Nitrogen


 38 mg/dL


(7-20) 36 mg/dL


(7-20) 


 





 


Creatinine


 1.3 mg/dL


(0.6-1.0) 1.3 mg/dL


(0.6-1.0) 


 





 


Estimated GFR


(Cockcroft-Gault) 43.5 


 43.5 


 


 





 


Glucose Level


 217 mg/dL


(70-99) 202 mg/dL


(70-99) 


 





 


Glucose (Fingerstick)


 199 mg/dL


(70-99) 


 189 mg/dL


(70-99) 





 


Calcium Level


 8.1 mg/dL


(8.5-10.1) 8.2 mg/dL


(8.5-10.1) 


 





 


Phosphorus Level


 3.3 mg/dL


(2.6-4.7) 3.5 mg/dL


(2.6-4.7) 


 





 


Magnesium Level


 2.3 mg/dL


(1.8-2.4) 2.3 mg/dL


(1.8-2.4) 


 











Laboratory Tests








Test


 3/28/20


11:30 3/28/20


11:45 3/28/20


17:47 3/28/20


17:48


 


Sodium Level


 139 mmol/L


(136-145) 


 


 138 mmol/L


(136-145)


 


Potassium Level


 4.4 mmol/L


(3.5-5.1) 


 


 4.6 mmol/L


(3.5-5.1)


 


Chloride Level


 105 mmol/L


() 


 


 105 mmol/L


()


 


Carbon Dioxide Level


 28 mmol/L


(21-32) 


 


 27 mmol/L


(21-32)


 


Anion Gap 6 (6-14)    6 (6-14) 


 


Blood Urea Nitrogen


 38 mg/dL


(7-20) 


 


 41 mg/dL


(7-20)


 


Creatinine


 1.3 mg/dL


(0.6-1.0) 


 


 1.4 mg/dL


(0.6-1.0)


 


Estimated GFR


(Cockcroft-Gault) 43.5 


 


 


 40.0 





 


Glucose Level


 235 mg/dL


(70-99) 


 


 233 mg/dL


(70-99)


 


Calcium Level


 8.1 mg/dL


(8.5-10.1) 


 


 8.0 mg/dL


(8.5-10.1)


 


Phosphorus Level


 3.3 mg/dL


(2.6-4.7) 


 


 3.6 mg/dL


(2.6-4.7)


 


Magnesium Level


 2.2 mg/dL


(1.8-2.4) 


 


 2.1 mg/dL


(1.8-2.4)


 


Glucose (Fingerstick)


 


 231 mg/dL


(70-99) 231 mg/dL


(70-99) 





 


Test


 3/28/20


23:30 3/29/20


05:40 3/29/20


05:41 





 


Sodium Level


 138 mmol/L


(136-145) 138 mmol/L


(136-145) 


 





 


Potassium Level


 4.3 mmol/L


(3.5-5.1) 4.6 mmol/L


(3.5-5.1) 


 





 


Chloride Level


 105 mmol/L


() 105 mmol/L


() 


 





 


Carbon Dioxide Level


 26 mmol/L


(21-32) 28 mmol/L


(21-32) 


 





 


Anion Gap 7 (6-14)  5 (6-14)   


 


Blood Urea Nitrogen


 38 mg/dL


(7-20) 36 mg/dL


(7-20) 


 





 


Creatinine


 1.3 mg/dL


(0.6-1.0) 1.3 mg/dL


(0.6-1.0) 


 





 


Estimated GFR


(Cockcroft-Gault) 43.5 


 43.5 


 


 





 


Glucose Level


 217 mg/dL


(70-99) 202 mg/dL


(70-99) 


 





 


Glucose (Fingerstick)


 199 mg/dL


(70-99) 


 189 mg/dL


(70-99) 





 


Calcium Level


 8.1 mg/dL


(8.5-10.1) 8.2 mg/dL


(8.5-10.1) 


 





 


Phosphorus Level


 3.3 mg/dL


(2.6-4.7) 3.5 mg/dL


(2.6-4.7) 


 





 


Magnesium Level


 2.3 mg/dL


(1.8-2.4) 2.3 mg/dL


(1.8-2.4) 


 











Medications





Active Scripts








 Medications  Dose


 Route/Sig


 Max Daily Dose Days Date Category


 


 Bisoprolol


 Fumarate 5 Mg


 Tablet  10 Mg


 PO DAILY


   3/16/20 Reported








Comments


Chest x-ray





Impression


.


IMPRESSION:


1.  Acute hypoxemic respiratory failure secondary to ARDS due toacute pan

creatitis, sepsic shock, abdominal distention, and pneumonia.and pleural 

effusions.  Pleural effusions secondary to abdominal process and/or general 

volume overload from renal failure.


2.  Gallstone pancreatitis. WITH NECROSIS


3.  Severe metabolic acidosis.


4.  Acute kidney injury. ON CRRT


5.  Acute gallstone pancreatitis.


6.  Hypoalbuminemia.


7.  Hypocalcemia.


8.  Leukocytosis


9.  Chronic anemia


10. Covid 19 testing negative





Plan


.


Continues to be hemodynamically stable, will continue current support


Not ready for trial


Cont AC mode , 40 FIO2/ 8 PEEP.  


Poor prognosis/ ARDS/septic shock


supportive care


CRRT


Repeat CT once stable


Antibiotics per ID


Follow nephrology input


Nutritional support with TPN


Prognosis is extremely poor


d/w RN/











STEVE MIRANDA MD              Mar 29, 2020 09:00

## 2020-03-29 NOTE — PDOC
[facility] PROGRESS NOTE


Chief Complaint


Chief Complaint


Respiratory failure requiring mechanical ventilation


Severe Acute pancreatitis


Acute kidney failure now requiring dialysis


Salpingo--itis


Gallstones (Calculus of gallbladder with acute cholecystitis without 

obstruction)


HTN 


Leukocytosis 


Hypoxia


Uterine fibroid


Hypoxia with respiratory failure


Intractable pain


Intractable nausea


Possible Covid 19?





History of Present Illness


History of Present Illness


[id number]


Patient seen and examined in the ICU


She is still on CRRT although we plan to change to hemodialysis soon


Chart reviewed


Discussed with RN


Hemoglobin down to 6.9 (suspect CRRT related , Will let nephrology consider tra

nsfusion while on dialysis)








[id number]


Patient seen and examined in the ICU


She is mechanically ventilated


Before meals/25/450/30%


Also on CRRT


Very critically ill


Chart reviewed


Discussed with RN











[id number]


Patient seen and examined in the ICU


She is now been transferred to the Covid 19 unit so we can rule out Covid and 

keep her in isolation


Very critically ill


Intubated and  ventilated


Assist control 40%


Chart reviewed


Discussed with RN


Still on CRRT


Getting a chest x-ray now


Very concerned about her prognosis








[id number]


Patient seen and examined in the ICU


She is extremely critically ill


Remains mechanically ventilated with assist control/25/450/40%


Also on continuous renal replacement therapy


Chart reviewed


Discussed with RN


She has TPN hanging


Also on pressors











[id number]


Patient seen and examined in the ICU


She is still requiring CRRT


On the vent with assist control but her FiO2 is down to 40% from yesterday


Slightly better but still very critically ill


Discussed with RN


Chart reviewed








[id number]


Patient seen and examined in the ICU


She remains extremely critically ill


On IV fentanyl IV Versed IV Doxy


On the vent


Assist-control/25/450/70%


She is critically ill


Discussed with RN


Chart reviewed


Patient is currently on CRRT as well








[id number]


Patient seen and examined in the ICU


She had to be intubated this morning


On assist-control 25/450/100% with 10 of PEEP and only satting 87%


She is extremely critically ill


I'm not sure if she will survive


Chart reviewed


Discussed with RN











Ms Tadeo is a 48yo F w/ PMHx HTN, prediabetes who presents the emergency room

complaints of abdominal pain. Patient described off and on 3 days. She states is

constant, described as a squeezing sensation in a band-like distribution. + 

nausea, vomiting.  She denies any fever or diarrhea.  Patient denies any 

abdominal surgical procedures.  She states is worse with movements, car ride.  

Pain initially was upper abdomen however now pretty much generalized.  Last 

bowel movement was 3/15/2020. Nothing makes her pain better.  Patient denies any

shortness of breath.  She does state the pain moves into her chest.  Denies any 

headache or visual changes.


Lipase 63389, , , Bilirubin 1.4.


CT abdomen confirms pancreatic inflammation, peripancreatic fluid and 

inflammatory changes around the pancreas consistent with pancreatitis. 

Cholelithiasis and 1.4cm uterine fibroid as well as possible left salpingitis. 

Admitted for further care


GI, General surgery, ID, Pulm consulted.





3/17: Overnight per report no urine output. Added dilaudid for pain, PICC placed

per IR. Renal US negative.Seen bedside in ICU, given 2L additional NSS and 

albumin infusion. Still hypotensive, started on levophed. Repeat CT abdomen with

necrosis. Updated her fiancee


3/18: Sats are only 87% on nasal cannula oxygen. Dialysis catheter per 

nephrology


3/19: She is now on BiPAP appears more ill, now on dialysis


3/20: Seen on BiPAP. Her mother and another family member are present and seemed

to be good support for her. Currently on dialysis. Appears critically ill


3/21: Overnight Tmax 101.7 , still on BiPAP FiO2 40%, still on low dose Levophed

gtt, TPN initiated. On dialysis





Overnight still febrile. Dialysis today. She wakes up and responds to pain. No 

CP.





Vitals/I&O


Vitals/I&O:





                                   Vital Signs








  Date Time  Temp Pulse Resp B/P (MAP) Pulse Ox O2 Delivery O2 Flow Rate FiO2


 


3/29/20 13:00  119 24 96/59 (71) 96 Ventilator  


 


3/29/20 11:00 100.0       





 100.0       














                                    I & O   


 


 3/28/20 3/28/20 3/29/20





 15:00 23:00 07:00


 


Intake Total   1682.8 ml


 


Output Total 140 ml 170 ml 105 ml


 


Balance -140 ml -170 ml 1577.8 ml











Physical Exam


Physical Exam:


GENERAL: Orally intubated/sedated - generalizd anasarca 


HEENT: Mild icterus, pupils equal, ETT, NGT


NECK:  Supple. 


LUNGS:  Decreased breath sounds at the bases.


HEART:  S1, S2, regular 


ABDOMEN:  Distended, hypoactive BS, Rectal tube in place 


: [place]   


EXTREMITIES: Generalized edema, no cyanosis - some mottling, [place] boots 

bilaterally 


DERMATOLOGIC:  Warm and dry.  No generalized rash.  


CENTRAL NERVOUS SYSTEM: Sedated 


RIJ Temp HDC, RUE-PICC & LIJ - clean


General:  Other (sedated )


Heart:  Other (increased rate)


Lungs:  Crackles


Abdomen:  Soft, Other (distended, obese, no obvious TTP)


Extremities:  No edema, Other (SOME CLUBBING )


Skin:  Other (mottling noted to extremities )





Labs


Labs:





Laboratory Tests








Test


 3/28/20


17:47 3/28/20


17:48 3/28/20


23:30 3/29/20


05:40


 


Glucose (Fingerstick)


 231 mg/dL


(70-99) 


 199 mg/dL


(70-99) 





 


Sodium Level


 


 138 mmol/L


(136-145) 138 mmol/L


(136-145) 138 mmol/L


(136-145)


 


Potassium Level


 


 4.6 mmol/L


(3.5-5.1) 4.3 mmol/L


(3.5-5.1) 4.6 mmol/L


(3.5-5.1)


 


Chloride Level


 


 105 mmol/L


() 105 mmol/L


() 105 mmol/L


()


 


Carbon Dioxide Level


 


 27 mmol/L


(21-32) 26 mmol/L


(21-32) 28 mmol/L


(21-32)


 


Anion Gap  6 (6-14)  7 (6-14)  5 (6-14) 


 


Blood Urea Nitrogen


 


 41 mg/dL


(7-20) 38 mg/dL


(7-20) 36 mg/dL


(7-20)


 


Creatinine


 


 1.4 mg/dL


(0.6-1.0) 1.3 mg/dL


(0.6-1.0) 1.3 mg/dL


(0.6-1.0)


 


Estimated GFR


(Cockcroft-Gault) 


 40.0 


 43.5 


 43.5 





 


Glucose Level


 


 233 mg/dL


(70-99) 217 mg/dL


(70-99) 202 mg/dL


(70-99)


 


Calcium Level


 


 8.0 mg/dL


(8.5-10.1) 8.1 mg/dL


(8.5-10.1) 8.2 mg/dL


(8.5-10.1)


 


Phosphorus Level


 


 3.6 mg/dL


(2.6-4.7) 3.3 mg/dL


(2.6-4.7) 3.5 mg/dL


(2.6-4.7)


 


Magnesium Level


 


 2.1 mg/dL


(1.8-2.4) 2.3 mg/dL


(1.8-2.4) 2.3 mg/dL


(1.8-2.4)


 


White Blood Count


 


 


 


 38.7 x10^3/uL


(4.0-11.0)


 


Red Blood Count


 


 


 


 2.10 x10^6/uL


(3.50-5.40)


 


Hemoglobin


 


 


 


 6.8 g/dL


(12.0-15.5)


 


Hematocrit


 


 


 


 20.7 %


(36.0-47.0)


 


Mean Corpuscular Volume    99 fL () 


 


Mean Corpuscular Hemoglobin    32 pg (25-35) 


 


Mean Corpuscular Hemoglobin


Concent 


 


 


 33 g/dL


(31-37)


 


Red Cell Distribution Width


 


 


 


 15.7 %


(11.5-14.5)


 


Platelet Count


 


 


 


 319 x10^3/uL


(140-400)


 


Neutrophils (%) (Auto)    90 % (31-73) 


 


Lymphocytes (%) (Auto)    5 % (24-48) 


 


Monocytes (%) (Auto)    4 % (0-9) 


 


Eosinophils (%) (Auto)    1 % (0-3) 


 


Basophils (%) (Auto)    1 % (0-3) 


 


Neutrophils # (Auto)


 


 


 


 34.7 x10^3/uL


(1.8-7.7)


 


Lymphocytes # (Auto)


 


 


 


 1.9 x10^3/uL


(1.0-4.8)


 


Monocytes # (Auto)


 


 


 


 1.4 x10^3/uL


(0.0-1.1)


 


Eosinophils # (Auto)


 


 


 


 0.5 x10^3/uL


(0.0-0.7)


 


Basophils # (Auto)


 


 


 


 0.2 x10^3/uL


(0.0-0.2)


 


Test


 3/29/20


05:41 3/29/20


12:10 3/29/20


13:00 





 


Glucose (Fingerstick)


 189 mg/dL


(70-99) 204 mg/dL


(70-99) 


 





 


Sodium Level


 


 


 138 mmol/L


(136-145) 





 


Potassium Level


 


 


 4.4 mmol/L


(3.5-5.1) 





 


Chloride Level


 


 


 103 mmol/L


() 





 


Carbon Dioxide Level


 


 


 27 mmol/L


(21-32) 





 


Anion Gap   8 (6-14)  


 


Blood Urea Nitrogen


 


 


 34 mg/dL


(7-20) 





 


Creatinine


 


 


 1.2 mg/dL


(0.6-1.0) 





 


Estimated GFR


(Cockcroft-Gault) 


 


 47.7 


 





 


Glucose Level


 


 


 210 mg/dL


(70-99) 





 


Calcium Level


 


 


 8.2 mg/dL


(8.5-10.1) 





 


Magnesium Level


 


 


 2.3 mg/dL


(1.8-2.4) 














Assessment and Plan


Assessmemt and Plan


Problems


Medical Problems:


(1) Acute pancreatitis


Status: Acute  





(2) Cholelithiasis


Status: Acute  





Respiratory failure requiring intubation


Severe Acute pancreatitis


Acute kidney failure now requiring dialysis


Salpingo--itis


Gallstones (Calculus of gallbladder with acute cholecystitis without 

obstruction)


HTN 


Leukocytosis 


Hypoxia


Uterine fibroid


Hypoxia with respiratory failure


Intractable pain


Intractable nausea











Plan


CRRT but we are changing to hemodialysis


Mechanical ventilation


ICU monitoring


When necessary transfusions


Trend labs especially white count and lipase levels


We have consulted GI and general surgery


Appreciate pulmonary assistance as well


IV antibiotics


IV fluids


PA narcotics


When necessary anti-medics


Home meds if possible


DVT prophylaxis


Full code


She is critically ill


Total time 34 minutes





Comment


Review of Relevant


I have reviewed the following items josy (where applicable) has been applied.


Medications:





Current Medications








 Medications


  (Trade)  Dose


 Ordered  Sig/Yee


 Route


 PRN Reason  Start Time


 Stop Time Status Last Admin


Dose Admin


 


 Midazolam HCl 100


 mg/Sodium Chloride  100 ml @ 7


 mls/hr  CONT  PRN


 IV


 SEE PROTOCOL  3/28/20 16:00


    3/29/20 12:28





 


 Sodium Chloride


 90 meq/Potassium


 Chloride 15 meq/


 Potassium


 Phosphate 18 mmol/


 Magnesium Sulfate


 8 meq/Calcium


 Gluconate 15 meq/


 Multivitamins 10


 ml/Chromium/


 Copper/Manganese/


 Seleni/Zn 0.5 ml/


 Insulin Human


 Regular 15 unit/


 Total Parenteral


 Nutrition/Amino


 Acids/Dextrose/


 Fat Emulsion


 Intravenous  1,400 ml @ 


 58.333 mls/


 hr  TPN  CONT


 IV


   3/28/20 22:00


 3/29/20 21:59  3/28/20 20:34














Hemodynamically unstable?:  Yes


Is patient in severe pain?:  Yes


Is NPO status required?:  Yes











HECTOR MASON III DO           Mar 29, 2020 13:19

## 2020-03-29 NOTE — PDOC
PROGRESS NOTES


Assessment


Assessment


Respiratory failure.


Seizure.


Metabolic encephalopathy.


Fever 102.7 degree.


Metabolic acidosis.


Diffuse pulmonary infiltrate.


Pleural effusion.


Pancreatitis


Gallstone.


Leukocytosis.


Electrolytes imbalances.


Hyperglycemia.


DM.


HTN.


HLD.


Anemia.


Obesity.





RECOMMENDATIONS/PLAN:


Continue life support in ICU at the present time.


Keppra if has further seizures.


Lab: see orders, including pending Covid-19 results.


Treat medical diseases.





EEG last week: No seizure activity.





Past Medical History


Cardiovascular:  HTN, Hyperlipidemia


Endocrine:  Diabetes





Family History


Unobtainable.





Social HistoryU


not obtainable





Allergies


Coded Allergies:  


     codeine (Verified  Allergy, Intermediate, rash, 3/16/20)





ROS


Unobtainable in unresponsive state.





PHYSICAL EXAMINATION:   


General appearance in sub acute distress.  


HEENT:  Normocephalic and nontraumatic.  Eyes, nose, ears, and throat are 

unremarkable.


Neck is supple. No lymphadenopathy. 


Cardiovascular:  S1, S2.  


Pulmonary:  On vent.. 


Abdomen:  Bowel sounds are weak.  


Extremities:  No rash, lesions.  





NEUROLOGICAL  EXAMINATION:


Unresponsive.


On vent.


Not oriented to time, place and person.


PERRL.


EOMI not elicited,


CN: no acute focal findings.


Muscle tone: within normal.


Muscle strength: No movements to stimuli.


DTR: 0-1


Plantar reflex: No response bilaterally 


Gait: not able to walk.


Sensory exam: no response to stimuli..


Not able to access cerebellar signs.


F-T-N test not performed due to unresponsiveness





Objective


Objective





Vital Signs








  Date Time  Temp Pulse Resp B/P (MAP) Pulse Ox O2 Delivery O2 Flow Rate FiO2


 


3/29/20 14:00  124 24 102/65 (77) 95 Ventilator  


 


3/29/20 11:00 100.0       





 100.0       














Intake and Output 


 


 3/29/20





 07:00


 


Intake Total 1682.8 ml


 


Output Total 415 ml


 


Balance 1267.8 ml


 


 


 


IV Total 1682.8 ml


 


Output Urine Total 415 ml











Vitals Signs


Vitals





                          VS - Last 72 Hours, by Label








  Date Time  Temp Pulse Resp B/P (MAP) Pulse Ox O2 Delivery O2 Flow Rate FiO2


 


3/29/20 14:00  124 24 102/65 (77) 95 Ventilator  


 


3/29/20 13:00  119 24 96/59 (71) 96 Ventilator  


 


3/29/20 12:00      Mechanical Ventilator  


 


3/29/20 12:00  122 26 128/68 (88) 98 Ventilator  


 


3/29/20 11:37     97 Ventilator  


 


3/29/20 11:00 100.0 127 20 141/82 (101) 96 Ventilator  





 100.0       


 


3/29/20 10:00  128 34 136/94 (108) 95 Ventilator  


 


3/29/20 09:00  122 25 114/79 (91) 97 Ventilator  


 


3/29/20 08:59     97 Ventilator  


 


3/29/20 08:42     96 Ventilator  


 


3/29/20 08:15      Mechanical Ventilator  


 


3/29/20 08:12     96 Ventilator  


 


3/29/20 08:00 99.7 120 25 114/67 (83) 95 Ventilator  





 99.7       


 


3/29/20 07:00  112 25 118/92 (101) 98 Ventilator  


 


3/29/20 06:00  112 25 103/53 (70) 98 Ventilator  


 


3/29/20 05:22     93 Ventilator  


 


3/29/20 05:00  102 25 109/73 (85) 98 Ventilator  


 


3/29/20 04:00      Mechanical Ventilator  


 


3/29/20 04:00 98.4 105 25 100/79 (86) 98 Ventilator  





 98.4       


 


3/29/20 03:00  102 25 104/50 (68) 98 Ventilator  


 


3/29/20 02:00  110 25 116/84 (95) 98 Ventilator  


 


3/29/20 01:38      Ventilator  


 


3/29/20 01:00 98.4 102 25 119/72 (88) 98 Ventilator  





 98.4       


 


3/29/20 00:00      Mechanical Ventilator  


 


3/29/20 00:00  103 25 108/84 (92) 98 Ventilator  


 


3/28/20 23:00  118 25 119/72 (88) 98 Ventilator  


 


3/28/20 22:15     96 Ventilator  


 


3/28/20 22:00  116 25 117/64 (81) 98 Ventilator  


 


3/28/20 21:00  124 26 130/82 (98) 98 Ventilator  


 


3/28/20 20:32     95 Ventilator  


 


3/28/20 20:00 98.0 120 26 97/77 (84) 98 Ventilator  





 98.0       


 


3/28/20 20:00      Mechanical Ventilator  


 


3/28/20 19:00  124 26 84/62 (69) 99 Ventilator  


 


3/28/20 18:08  123 26 95/58 (70) 98 Ventilator  


 


3/28/20 17:04     95 Ventilator  


 


3/28/20 17:00  114 26 105/98 (100) 95 Ventilator  


 


3/28/20 16:00 98.5 107 26 119/65 (83) 99 Ventilator  





 98.5       


 


3/28/20 16:00      Mechanical Ventilator  


 


3/28/20 15:00  117 26 119/62 (81) 98 Ventilator  


 


3/28/20 14:00  106 26 113/57 (75) 98 Ventilator  


 


3/28/20 13:00  107 27 97/71 (80) 98 Ventilator  


 


3/28/20 12:45     98 Ventilator  


 


3/28/20 12:00  115 25 111/62 (78) 98 Ventilator  


 


3/28/20 12:00      Mechanical Ventilator  


 


3/28/20 11:00  104 27 111/69 (83) 98 Ventilator  


 


3/28/20 10:00  105 26 119/84 (96) 98 Ventilator  


 


3/28/20 09:00  108 26 133/58 (83) 98 Ventilator  


 


3/28/20 08:06     98 Ventilator  


 


3/28/20 08:00      Mechanical Ventilator  


 


3/28/20 08:00 98.4 107 26 113/85 (94) 94 Ventilator  





 98.4       


 


3/28/20 07:00  103 25 78/61 (67) 98 Ventilator  











Laboratory


Laboratory





Laboratory Tests








Test


 3/28/20


17:47 3/28/20


17:48 3/28/20


23:30 3/29/20


05:40


 


Glucose (Fingerstick)


 231 mg/dL


(70-99) 


 199 mg/dL


(70-99) 





 


Sodium Level


 


 138 mmol/L


(136-145) 138 mmol/L


(136-145) 138 mmol/L


(136-145)


 


Potassium Level


 


 4.6 mmol/L


(3.5-5.1) 4.3 mmol/L


(3.5-5.1) 4.6 mmol/L


(3.5-5.1)


 


Chloride Level


 


 105 mmol/L


() 105 mmol/L


() 105 mmol/L


()


 


Carbon Dioxide Level


 


 27 mmol/L


(21-32) 26 mmol/L


(21-32) 28 mmol/L


(21-32)


 


Anion Gap  6 (6-14)  7 (6-14)  5 (6-14) 


 


Blood Urea Nitrogen


 


 41 mg/dL


(7-20) 38 mg/dL


(7-20) 36 mg/dL


(7-20)


 


Creatinine


 


 1.4 mg/dL


(0.6-1.0) 1.3 mg/dL


(0.6-1.0) 1.3 mg/dL


(0.6-1.0)


 


Estimated GFR


(Cockcroft-Gault) 


 40.0 


 43.5 


 43.5 





 


Glucose Level


 


 233 mg/dL


(70-99) 217 mg/dL


(70-99) 202 mg/dL


(70-99)


 


Calcium Level


 


 8.0 mg/dL


(8.5-10.1) 8.1 mg/dL


(8.5-10.1) 8.2 mg/dL


(8.5-10.1)


 


Phosphorus Level


 


 3.6 mg/dL


(2.6-4.7) 3.3 mg/dL


(2.6-4.7) 3.5 mg/dL


(2.6-4.7)


 


Magnesium Level


 


 2.1 mg/dL


(1.8-2.4) 2.3 mg/dL


(1.8-2.4) 2.3 mg/dL


(1.8-2.4)


 


White Blood Count


 


 


 


 38.7 x10^3/uL


(4.0-11.0)


 


Red Blood Count


 


 


 


 2.10 x10^6/uL


(3.50-5.40)


 


Hemoglobin


 


 


 


 6.8 g/dL


(12.0-15.5)


 


Hematocrit


 


 


 


 20.7 %


(36.0-47.0)


 


Mean Corpuscular Volume    99 fL () 


 


Mean Corpuscular Hemoglobin    32 pg (25-35) 


 


Mean Corpuscular Hemoglobin


Concent 


 


 


 33 g/dL


(31-37)


 


Red Cell Distribution Width


 


 


 


 15.7 %


(11.5-14.5)


 


Platelet Count


 


 


 


 319 x10^3/uL


(140-400)


 


Neutrophils (%) (Auto)    90 % (31-73) 


 


Lymphocytes (%) (Auto)    5 % (24-48) 


 


Monocytes (%) (Auto)    4 % (0-9) 


 


Eosinophils (%) (Auto)    1 % (0-3) 


 


Basophils (%) (Auto)    1 % (0-3) 


 


Neutrophils # (Auto)


 


 


 


 34.7 x10^3/uL


(1.8-7.7)


 


Lymphocytes # (Auto)


 


 


 


 1.9 x10^3/uL


(1.0-4.8)


 


Monocytes # (Auto)


 


 


 


 1.4 x10^3/uL


(0.0-1.1)


 


Eosinophils # (Auto)


 


 


 


 0.5 x10^3/uL


(0.0-0.7)


 


Basophils # (Auto)


 


 


 


 0.2 x10^3/uL


(0.0-0.2)


 


Test


 3/29/20


05:41 3/29/20


12:10 3/29/20


13:00 





 


Glucose (Fingerstick)


 189 mg/dL


(70-99) 204 mg/dL


(70-99) 


 





 


Sodium Level


 


 


 138 mmol/L


(136-145) 





 


Potassium Level


 


 


 4.4 mmol/L


(3.5-5.1) 





 


Chloride Level


 


 


 103 mmol/L


() 





 


Carbon Dioxide Level


 


 


 27 mmol/L


(21-32) 





 


Anion Gap   8 (6-14)  


 


Blood Urea Nitrogen


 


 


 34 mg/dL


(7-20) 





 


Creatinine


 


 


 1.2 mg/dL


(0.6-1.0) 





 


Estimated GFR


(Cockcroft-Gault) 


 


 47.7 


 





 


Glucose Level


 


 


 210 mg/dL


(70-99) 





 


Calcium Level


 


 


 8.2 mg/dL


(8.5-10.1) 





 


Phosphorus Level


 


 


 2.6 mg/dL


(2.6-4.7) 





 


Magnesium Level


 


 


 2.3 mg/dL


(1.8-2.4) 











Microbiology


3/24/20 Blood Culture - Final, Complete


          NO GROWTH AFTER 5 DAYS





Medication


Medications





Current Medications


Info ([facility] Electrolyte Protocol) 1 ea CONT PRN  PRN MC PER PROTOCOL;  Start 

3/29/20 at 13:15


Midazolam HCl 100 mg/Sodium Chloride 100 ml @ 7 mls/hr CONT  PRN IV SEE PROTOCOL

Last administered on 3/29/20at 12:28;  Start 3/28/20 at 16:00


Sodium Chloride 90 meq/Potassium Chloride 15 meq/ Potassium Phosphate 18 mmol/ M

agnesium Sulfate 8 meq/Calcium Gluconate 15 meq/ Multivitamins 10 ml/Chromium/ 

Copper/Manganese/ Seleni/Zn 0.5 ml/ Insulin Human Regular 15 unit/ Total 

Parenteral Nutrition/Amino Acids/Dextrose/ Fat Emulsion Intravenous 1,400 ml @  

58.333 mls/ hr TPN  CONT IV  Last administered on 3/28/20at 20:34;  Start 

3/28/20 at 22:00;  Stop 3/29/20 at 21:59


Sodium Chloride 90 meq/Potassium Chloride 15 meq/ Potassium Phosphate 18 mmol/ 

Magnesium Sulfate 8 meq/Calcium Gluconate 15 meq/ Multivitamins 10 ml/Chromium/ 

Copper/Manganese/ Seleni/Zn 0.5 ml/ Insulin Human Regular 15 unit/ Total 

Parenteral Nutrition/Amino Acids/Dextrose/ Fat Emulsion Intravenous 1,400 ml @  

58.333 mls/ hr TPN  CONT IV ;  Start 3/29/20 at 22:00;  Stop 3/30/20 at 21:59





Comment


Review of Relevant


I have reviewed the following items josy (where applicable) has been applied.











DORYS LANDA MD                 Mar 29, 2020 15:39

## 2020-03-30 VITALS — SYSTOLIC BLOOD PRESSURE: 98 MMHG | DIASTOLIC BLOOD PRESSURE: 78 MMHG

## 2020-03-30 VITALS — DIASTOLIC BLOOD PRESSURE: 58 MMHG | SYSTOLIC BLOOD PRESSURE: 96 MMHG

## 2020-03-30 VITALS — DIASTOLIC BLOOD PRESSURE: 60 MMHG | SYSTOLIC BLOOD PRESSURE: 95 MMHG

## 2020-03-30 VITALS — SYSTOLIC BLOOD PRESSURE: 112 MMHG | DIASTOLIC BLOOD PRESSURE: 52 MMHG

## 2020-03-30 VITALS — SYSTOLIC BLOOD PRESSURE: 90 MMHG | DIASTOLIC BLOOD PRESSURE: 65 MMHG

## 2020-03-30 VITALS — DIASTOLIC BLOOD PRESSURE: 69 MMHG | SYSTOLIC BLOOD PRESSURE: 115 MMHG

## 2020-03-30 VITALS — DIASTOLIC BLOOD PRESSURE: 55 MMHG | SYSTOLIC BLOOD PRESSURE: 105 MMHG

## 2020-03-30 VITALS — DIASTOLIC BLOOD PRESSURE: 52 MMHG | SYSTOLIC BLOOD PRESSURE: 103 MMHG

## 2020-03-30 VITALS — SYSTOLIC BLOOD PRESSURE: 129 MMHG | DIASTOLIC BLOOD PRESSURE: 48 MMHG

## 2020-03-30 VITALS — DIASTOLIC BLOOD PRESSURE: 63 MMHG | SYSTOLIC BLOOD PRESSURE: 112 MMHG

## 2020-03-30 VITALS — DIASTOLIC BLOOD PRESSURE: 62 MMHG | SYSTOLIC BLOOD PRESSURE: 102 MMHG

## 2020-03-30 VITALS — SYSTOLIC BLOOD PRESSURE: 89 MMHG | DIASTOLIC BLOOD PRESSURE: 67 MMHG

## 2020-03-30 VITALS — DIASTOLIC BLOOD PRESSURE: 55 MMHG | SYSTOLIC BLOOD PRESSURE: 123 MMHG

## 2020-03-30 VITALS — SYSTOLIC BLOOD PRESSURE: 99 MMHG | DIASTOLIC BLOOD PRESSURE: 55 MMHG

## 2020-03-30 VITALS — SYSTOLIC BLOOD PRESSURE: 73 MMHG | DIASTOLIC BLOOD PRESSURE: 56 MMHG

## 2020-03-30 VITALS — SYSTOLIC BLOOD PRESSURE: 105 MMHG | DIASTOLIC BLOOD PRESSURE: 59 MMHG

## 2020-03-30 VITALS — SYSTOLIC BLOOD PRESSURE: 108 MMHG | DIASTOLIC BLOOD PRESSURE: 54 MMHG

## 2020-03-30 VITALS — DIASTOLIC BLOOD PRESSURE: 55 MMHG | SYSTOLIC BLOOD PRESSURE: 110 MMHG

## 2020-03-30 VITALS — SYSTOLIC BLOOD PRESSURE: 98 MMHG | DIASTOLIC BLOOD PRESSURE: 69 MMHG

## 2020-03-30 VITALS — DIASTOLIC BLOOD PRESSURE: 57 MMHG | SYSTOLIC BLOOD PRESSURE: 96 MMHG

## 2020-03-30 VITALS — SYSTOLIC BLOOD PRESSURE: 91 MMHG | DIASTOLIC BLOOD PRESSURE: 66 MMHG

## 2020-03-30 VITALS — DIASTOLIC BLOOD PRESSURE: 57 MMHG | SYSTOLIC BLOOD PRESSURE: 107 MMHG

## 2020-03-30 VITALS — SYSTOLIC BLOOD PRESSURE: 96 MMHG | DIASTOLIC BLOOD PRESSURE: 58 MMHG

## 2020-03-30 VITALS — DIASTOLIC BLOOD PRESSURE: 60 MMHG | SYSTOLIC BLOOD PRESSURE: 111 MMHG

## 2020-03-30 VITALS — SYSTOLIC BLOOD PRESSURE: 95 MMHG | DIASTOLIC BLOOD PRESSURE: 69 MMHG

## 2020-03-30 VITALS — SYSTOLIC BLOOD PRESSURE: 119 MMHG | DIASTOLIC BLOOD PRESSURE: 53 MMHG

## 2020-03-30 VITALS — SYSTOLIC BLOOD PRESSURE: 121 MMHG | DIASTOLIC BLOOD PRESSURE: 91 MMHG

## 2020-03-30 VITALS — SYSTOLIC BLOOD PRESSURE: 105 MMHG | DIASTOLIC BLOOD PRESSURE: 51 MMHG

## 2020-03-30 LAB
% BANDS: 19 % (ref 0–9)
% LYMPHS: 6 % (ref 24–48)
% MONOS: 5 % (ref 0–10)
% MYELOS: 1 % (ref 0–0)
% SEGS: 68 % (ref 35–66)
ANION GAP SERPL CALC-SCNC: 6 MMOL/L (ref 6–14)
ANION GAP SERPL CALC-SCNC: 6 MMOL/L (ref 6–14)
ANION GAP SERPL CALC-SCNC: 8 MMOL/L (ref 6–14)
ANISOCYTOSIS BLD QL SMEAR: SLIGHT
BASOPHILS # BLD AUTO: 0.2 X10^3/UL (ref 0–0.2)
BASOPHILS NFR BLD: 0 % (ref 0–3)
BUN SERPL-MCNC: 35 MG/DL (ref 7–20)
BUN SERPL-MCNC: 37 MG/DL (ref 7–20)
BUN SERPL-MCNC: 41 MG/DL (ref 7–20)
CALCIUM SERPL-MCNC: 7.9 MG/DL (ref 8.5–10.1)
CALCIUM SERPL-MCNC: 8.1 MG/DL (ref 8.5–10.1)
CALCIUM SERPL-MCNC: 8.1 MG/DL (ref 8.5–10.1)
CHLORIDE SERPL-SCNC: 103 MMOL/L (ref 98–107)
CHLORIDE SERPL-SCNC: 103 MMOL/L (ref 98–107)
CHLORIDE SERPL-SCNC: 104 MMOL/L (ref 98–107)
CO2 SERPL-SCNC: 26 MMOL/L (ref 21–32)
CO2 SERPL-SCNC: 27 MMOL/L (ref 21–32)
CO2 SERPL-SCNC: 27 MMOL/L (ref 21–32)
CREAT SERPL-MCNC: 1.3 MG/DL (ref 0.6–1)
CREAT SERPL-MCNC: 1.3 MG/DL (ref 0.6–1)
CREAT SERPL-MCNC: 1.6 MG/DL (ref 0.6–1)
EOSINOPHIL NFR BLD AUTO: 1 % (ref 0–5)
EOSINOPHIL NFR BLD: 0.5 X10^3/UL (ref 0–0.7)
EOSINOPHIL NFR BLD: 1 % (ref 0–3)
ERYTHROCYTE [DISTWIDTH] IN BLOOD BY AUTOMATED COUNT: 15.5 % (ref 11.5–14.5)
GFR SERPLBLD BASED ON 1.73 SQ M-ARVRAT: 34.3 ML/MIN
GFR SERPLBLD BASED ON 1.73 SQ M-ARVRAT: 43.5 ML/MIN
GFR SERPLBLD BASED ON 1.73 SQ M-ARVRAT: 43.5 ML/MIN
GLUCOSE SERPL-MCNC: 204 MG/DL (ref 70–99)
GLUCOSE SERPL-MCNC: 213 MG/DL (ref 70–99)
GLUCOSE SERPL-MCNC: 244 MG/DL (ref 70–99)
HCT VFR BLD CALC: 22 % (ref 36–47)
HGB BLD-MCNC: 7.3 G/DL (ref 12–15.5)
LYMPHOCYTES # BLD: 4 X10^3/UL (ref 1–4.8)
LYMPHOCYTES NFR BLD AUTO: 9 % (ref 24–48)
MACROCYTES BLD QL SMEAR: PRESENT
MAGNESIUM SERPL-MCNC: 2.2 MG/DL (ref 1.8–2.4)
MAGNESIUM SERPL-MCNC: 2.3 MG/DL (ref 1.8–2.4)
MAGNESIUM SERPL-MCNC: 2.4 MG/DL (ref 1.8–2.4)
MCH RBC QN AUTO: 33 PG (ref 25–35)
MCHC RBC AUTO-ENTMCNC: 33 G/DL (ref 31–37)
MCV RBC AUTO: 100 FL (ref 79–100)
MONO #: 2 X10^3/UL (ref 0–1.1)
MONOCYTES NFR BLD: 4 % (ref 0–9)
NEUT #: 38.4 X10^3/UL (ref 1.8–7.7)
NEUTROPHILS NFR BLD AUTO: 85 % (ref 31–73)
NRBC # BLD MANUAL: 4 10*3/UL
PHOSPHATE SERPL-MCNC: 2.9 MG/DL (ref 2.6–4.7)
PHOSPHATE SERPL-MCNC: 3 MG/DL (ref 2.6–4.7)
PLATELET # BLD AUTO: 383 X10^3/UL (ref 140–400)
PLATELET # BLD EST: ADEQUATE 10*3/UL
POLYCHROMASIA BLD QL SMEAR: PRESENT
POTASSIUM SERPL-SCNC: 4.3 MMOL/L (ref 3.5–5.1)
POTASSIUM SERPL-SCNC: 4.4 MMOL/L (ref 3.5–5.1)
POTASSIUM SERPL-SCNC: 4.4 MMOL/L (ref 3.5–5.1)
RBC # BLD AUTO: 2.2 X10^6/UL (ref 3.5–5.4)
SODIUM SERPL-SCNC: 136 MMOL/L (ref 136–145)
SODIUM SERPL-SCNC: 137 MMOL/L (ref 136–145)
SODIUM SERPL-SCNC: 137 MMOL/L (ref 136–145)
TOXIC GRANULES BLD QL SMEAR: PRESENT
WBC # BLD AUTO: 45 X10^3/UL (ref 4–11)

## 2020-03-30 RX ADMIN — Medication PRN APP: at 08:05

## 2020-03-30 RX ADMIN — INSULIN LISPRO SCH UNITS: 100 INJECTION, SOLUTION INTRAVENOUS; SUBCUTANEOUS at 05:39

## 2020-03-30 RX ADMIN — ALBUMIN (HUMAN) PRN MLS/HR: 12.5 INJECTION, SOLUTION INTRAVENOUS at 22:54

## 2020-03-30 RX ADMIN — POTASSIUM CHLORIDE SCH MLS/HR: 2 INJECTION, SOLUTION, CONCENTRATE INTRAVENOUS at 14:48

## 2020-03-30 RX ADMIN — CEFEPIME SCH GM: 2 INJECTION, POWDER, FOR SOLUTION INTRAVENOUS at 08:11

## 2020-03-30 RX ADMIN — Medication PRN EACH: at 13:33

## 2020-03-30 RX ADMIN — POTASSIUM CHLORIDE SCH MLS/HR: 2 INJECTION, SOLUTION, CONCENTRATE INTRAVENOUS at 06:06

## 2020-03-30 RX ADMIN — INSULIN LISPRO SCH UNITS: 100 INJECTION, SOLUTION INTRAVENOUS; SUBCUTANEOUS at 00:16

## 2020-03-30 RX ADMIN — INSULIN LISPRO SCH UNITS: 100 INJECTION, SOLUTION INTRAVENOUS; SUBCUTANEOUS at 17:41

## 2020-03-30 RX ADMIN — POTASSIUM CHLORIDE SCH MLS/HR: 2 INJECTION, SOLUTION, CONCENTRATE INTRAVENOUS at 01:16

## 2020-03-30 RX ADMIN — MIDAZOLAM HYDROCHLORIDE PRN MLS/HR: 5 INJECTION, SOLUTION INTRAMUSCULAR; INTRAVENOUS at 02:15

## 2020-03-30 RX ADMIN — INSULIN LISPRO SCH UNITS: 100 INJECTION, SOLUTION INTRAVENOUS; SUBCUTANEOUS at 14:25

## 2020-03-30 RX ADMIN — CEFEPIME SCH GM: 2 INJECTION, POWDER, FOR SOLUTION INTRAVENOUS at 21:00

## 2020-03-30 RX ADMIN — POTASSIUM CHLORIDE SCH MLS/HR: 2 INJECTION, SOLUTION, CONCENTRATE INTRAVENOUS at 06:07

## 2020-03-30 RX ADMIN — AMIODARONE HYDROCHLORIDE PRN MLS/HR: 50 INJECTION, SOLUTION INTRAVENOUS at 19:57

## 2020-03-30 RX ADMIN — POTASSIUM CHLORIDE SCH MLS/HR: 2 INJECTION, SOLUTION, CONCENTRATE INTRAVENOUS at 19:29

## 2020-03-30 RX ADMIN — MIDAZOLAM HYDROCHLORIDE PRN MLS/HR: 5 INJECTION, SOLUTION INTRAMUSCULAR; INTRAVENOUS at 23:36

## 2020-03-30 RX ADMIN — AMIODARONE HYDROCHLORIDE PRN MLS/HR: 50 INJECTION, SOLUTION INTRAVENOUS at 01:26

## 2020-03-30 RX ADMIN — INSULIN LISPRO SCH UNITS: 100 INJECTION, SOLUTION INTRAVENOUS; SUBCUTANEOUS at 23:35

## 2020-03-30 RX ADMIN — POTASSIUM CHLORIDE SCH MLS/HR: 2 INJECTION, SOLUTION, CONCENTRATE INTRAVENOUS at 14:49

## 2020-03-30 RX ADMIN — ALBUMIN (HUMAN) PRN MLS/HR: 12.5 INJECTION, SOLUTION INTRAVENOUS at 06:59

## 2020-03-30 RX ADMIN — POTASSIUM CHLORIDE SCH MLS/HR: 2 INJECTION, SOLUTION, CONCENTRATE INTRAVENOUS at 19:30

## 2020-03-30 RX ADMIN — PANTOPRAZOLE SODIUM SCH MG: 40 INJECTION, POWDER, FOR SOLUTION INTRAVENOUS at 08:07

## 2020-03-30 RX ADMIN — DEXTROSE SCH MLS/HR: 50 INJECTION, SOLUTION INTRAVENOUS at 08:31

## 2020-03-30 RX ADMIN — POTASSIUM CHLORIDE SCH MLS/HR: 2 INJECTION, SOLUTION, CONCENTRATE INTRAVENOUS at 01:17

## 2020-03-30 NOTE — PDOC
SAURAV NASH APRN 3/30/20 0926:


SURGICAL PROGRESS NOTE


Subjective


d/w nursing, ID


wbc rising


plans for repeat CT


Vital Signs





Vital Signs








  Date Time  Temp Pulse Resp B/P (MAP) Pulse Ox O2 Delivery O2 Flow Rate FiO2


 


3/30/20 08:00 99.8 121 25 98/78 (85) 99 Ventilator  





 99.8       








I&O











Intake and Output 


 


 3/30/20





 07:00


 


Intake Total 2269 ml


 


Output Total 490 ml


 


Balance 1779 ml


 


 


 


IV Total 2269 ml


 


Output Urine Total 390 ml


 


Gastric Drainage Total 100 ml








General:  Other (sedated )


HEENT:  Other (vent)


Abdomen:  Other (firm abdomen)


Labs





Laboratory Tests








Test


 3/28/20


11:30 3/28/20


11:45 3/28/20


17:47 3/28/20


17:48


 


Sodium Level


 139 mmol/L


(136-145) 


 


 138 mmol/L


(136-145)


 


Potassium Level


 4.4 mmol/L


(3.5-5.1) 


 


 4.6 mmol/L


(3.5-5.1)


 


Chloride Level


 105 mmol/L


() 


 


 105 mmol/L


()


 


Carbon Dioxide Level


 28 mmol/L


(21-32) 


 


 27 mmol/L


(21-32)


 


Anion Gap 6 (6-14)    6 (6-14) 


 


Blood Urea Nitrogen


 38 mg/dL


(7-20) 


 


 41 mg/dL


(7-20)


 


Creatinine


 1.3 mg/dL


(0.6-1.0) 


 


 1.4 mg/dL


(0.6-1.0)


 


Estimated GFR


(Cockcroft-Gault) 43.5 


 


 


 40.0 





 


Glucose Level


 235 mg/dL


(70-99) 


 


 233 mg/dL


(70-99)


 


Calcium Level


 8.1 mg/dL


(8.5-10.1) 


 


 8.0 mg/dL


(8.5-10.1)


 


Phosphorus Level


 3.3 mg/dL


(2.6-4.7) 


 


 3.6 mg/dL


(2.6-4.7)


 


Magnesium Level


 2.2 mg/dL


(1.8-2.4) 


 


 2.1 mg/dL


(1.8-2.4)


 


Glucose (Fingerstick)


 


 231 mg/dL


(70-99) 231 mg/dL


(70-99) 





 


Test


 3/28/20


23:30 3/29/20


05:40 3/29/20


05:41 3/29/20


09:15


 


Sodium Level


 138 mmol/L


(136-145) 138 mmol/L


(136-145) 


 





 


Potassium Level


 4.3 mmol/L


(3.5-5.1) 4.6 mmol/L


(3.5-5.1) 


 





 


Chloride Level


 105 mmol/L


() 105 mmol/L


() 


 





 


Carbon Dioxide Level


 26 mmol/L


(21-32) 28 mmol/L


(21-32) 


 





 


Anion Gap 7 (6-14)  5 (6-14)   


 


Blood Urea Nitrogen


 38 mg/dL


(7-20) 36 mg/dL


(7-20) 


 





 


Creatinine


 1.3 mg/dL


(0.6-1.0) 1.3 mg/dL


(0.6-1.0) 


 





 


Estimated GFR


(Cockcroft-Gault) 43.5 


 43.5 


 


 





 


Glucose Level


 217 mg/dL


(70-99) 202 mg/dL


(70-99) 


 





 


Glucose (Fingerstick)


 199 mg/dL


(70-99) 


 189 mg/dL


(70-99) 





 


Calcium Level


 8.1 mg/dL


(8.5-10.1) 8.2 mg/dL


(8.5-10.1) 


 





 


Phosphorus Level


 3.3 mg/dL


(2.6-4.7) 3.5 mg/dL


(2.6-4.7) 


 





 


Magnesium Level


 2.3 mg/dL


(1.8-2.4) 2.3 mg/dL


(1.8-2.4) 


 





 


White Blood Count


 


 38.7 x10^3/uL


(4.0-11.0) 


 





 


Red Blood Count


 


 2.10 x10^6/uL


(3.50-5.40) 


 





 


Hemoglobin


 


 6.8 g/dL


(12.0-15.5) 


 





 


Hematocrit


 


 20.7 %


(36.0-47.0) 


 





 


Mean Corpuscular Volume  99 fL ()   


 


Mean Corpuscular Hemoglobin  32 pg (25-35)   


 


Mean Corpuscular Hemoglobin


Concent 


 33 g/dL


(31-37) 


 





 


Red Cell Distribution Width


 


 15.7 %


(11.5-14.5) 


 





 


Platelet Count


 


 319 x10^3/uL


(140-400) 


 





 


Neutrophils (%) (Auto)  90 % (31-73)   


 


Lymphocytes (%) (Auto)  5 % (24-48)   


 


Monocytes (%) (Auto)  4 % (0-9)   


 


Eosinophils (%) (Auto)  1 % (0-3)   


 


Basophils (%) (Auto)  1 % (0-3)   


 


Neutrophils # (Auto)


 


 34.7 x10^3/uL


(1.8-7.7) 


 





 


Lymphocytes # (Auto)


 


 1.9 x10^3/uL


(1.0-4.8) 


 





 


Monocytes # (Auto)


 


 1.4 x10^3/uL


(0.0-1.1) 


 





 


Eosinophils # (Auto)


 


 0.5 x10^3/uL


(0.0-0.7) 


 





 


Basophils # (Auto)


 


 0.2 x10^3/uL


(0.0-0.2) 


 





 


O2 Saturation    92 % (92-99) 


 


Arterial Blood pH


 


 


 


 7.38


(7.35-7.45)


 


Arterial Blood pCO2 at


Patient Temp 


 


 


 41 mmHg


(35-46)


 


Arterial Blood pO2 at Patient


Temp 


 


 


 72 mmHg


()


 


Arterial Blood HCO3


 


 


 


 23 mmol/L


(21-28)


 


Arterial Blood Base Excess


 


 


 


 -2 mmol/L


(-3-3)


 


FiO2    40 


 


Test


 3/29/20


12:10 3/29/20


13:00 3/29/20


17:45 3/29/20


18:25


 


Glucose (Fingerstick)


 204 mg/dL


(70-99) 


 206 mg/dL


(70-99) 





 


Sodium Level


 


 138 mmol/L


(136-145) 


 138 mmol/L


(136-145)


 


Potassium Level


 


 4.4 mmol/L


(3.5-5.1) 


 4.3 mmol/L


(3.5-5.1)


 


Chloride Level


 


 103 mmol/L


() 


 105 mmol/L


()


 


Carbon Dioxide Level


 


 27 mmol/L


(21-32) 


 28 mmol/L


(21-32)


 


Anion Gap  8 (6-14)   5 (6-14) 


 


Blood Urea Nitrogen


 


 34 mg/dL


(7-20) 


 36 mg/dL


(7-20)


 


Creatinine


 


 1.2 mg/dL


(0.6-1.0) 


 1.3 mg/dL


(0.6-1.0)


 


Estimated GFR


(Cockcroft-Gault) 


 47.7 


 


 43.5 





 


Glucose Level


 


 210 mg/dL


(70-99) 


 211 mg/dL


(70-99)


 


Calcium Level


 


 8.2 mg/dL


(8.5-10.1) 


 7.8 mg/dL


(8.5-10.1)


 


Phosphorus Level


 


 2.6 mg/dL


(2.6-4.7) 


 2.8 mg/dL


(2.6-4.7)


 


Magnesium Level


 


 2.3 mg/dL


(1.8-2.4) 


 2.3 mg/dL


(1.8-2.4)


 


Test


 3/29/20


23:50 3/30/20


00:04 3/30/20


05:30 3/30/20


05:35


 


Sodium Level


 137 mmol/L


(136-145) 


 136 mmol/L


(136-145) 





 


Potassium Level


 4.4 mmol/L


(3.5-5.1) 


 4.3 mmol/L


(3.5-5.1) 





 


Chloride Level


 104 mmol/L


() 


 103 mmol/L


() 





 


Carbon Dioxide Level


 27 mmol/L


(21-32) 


 27 mmol/L


(21-32) 





 


Anion Gap 6 (6-14)   6 (6-14)  


 


Blood Urea Nitrogen


 37 mg/dL


(7-20) 


 35 mg/dL


(7-20) 





 


Creatinine


 1.3 mg/dL


(0.6-1.0) 


 1.3 mg/dL


(0.6-1.0) 





 


Estimated GFR


(Cockcroft-Gault) 43.5 


 


 43.5 


 





 


Glucose Level


 213 mg/dL


(70-99) 


 204 mg/dL


(70-99) 





 


Calcium Level


 7.9 mg/dL


(8.5-10.1) 


 8.1 mg/dL


(8.5-10.1) 





 


Phosphorus Level


 2.9 mg/dL


(2.6-4.7) 


 2.9 mg/dL


(2.6-4.7) 





 


Magnesium Level


 2.3 mg/dL


(1.8-2.4) 


 2.4 mg/dL


(1.8-2.4) 





 


Glucose (Fingerstick)


 


 177 mg/dL


(70-99) 


 187 mg/dL


(70-99)


 


White Blood Count


 


 


 45.0 x10^3/uL


(4.0-11.0) 





 


Red Blood Count


 


 


 2.20 x10^6/uL


(3.50-5.40) 





 


Hemoglobin


 


 


 7.3 g/dL


(12.0-15.5) 





 


Hematocrit


 


 


 22.0 %


(36.0-47.0) 





 


Mean Corpuscular Volume


 


 


 100 fL


() 





 


Mean Corpuscular Hemoglobin   33 pg (25-35)  


 


Mean Corpuscular Hemoglobin


Concent 


 


 33 g/dL


(31-37) 





 


Red Cell Distribution Width


 


 


 15.5 %


(11.5-14.5) 





 


Platelet Count


 


 


 383 x10^3/uL


(140-400) 





 


Neutrophils (%) (Auto)   85 % (31-73)  


 


Lymphocytes (%) (Auto)   9 % (24-48)  


 


Monocytes (%) (Auto)   4 % (0-9)  


 


Eosinophils (%) (Auto)   1 % (0-3)  


 


Basophils (%) (Auto)   0 % (0-3)  


 


Neutrophils # (Auto)


 


 


 38.4 x10^3/uL


(1.8-7.7) 





 


Lymphocytes # (Auto)


 


 


 4.0 x10^3/uL


(1.0-4.8) 





 


Monocytes # (Auto)


 


 


 2.0 x10^3/uL


(0.0-1.1) 





 


Eosinophils # (Auto)


 


 


 0.5 x10^3/uL


(0.0-0.7) 





 


Basophils # (Auto)


 


 


 0.2 x10^3/uL


(0.0-0.2) 











Laboratory Tests








Test


 3/29/20


12:10 3/29/20


13:00 3/29/20


17:45 3/29/20


18:25


 


Glucose (Fingerstick)


 204 mg/dL


(70-99) 


 206 mg/dL


(70-99) 





 


Sodium Level


 


 138 mmol/L


(136-145) 


 138 mmol/L


(136-145)


 


Potassium Level


 


 4.4 mmol/L


(3.5-5.1) 


 4.3 mmol/L


(3.5-5.1)


 


Chloride Level


 


 103 mmol/L


() 


 105 mmol/L


()


 


Carbon Dioxide Level


 


 27 mmol/L


(21-32) 


 28 mmol/L


(21-32)


 


Anion Gap  8 (6-14)   5 (6-14) 


 


Blood Urea Nitrogen


 


 34 mg/dL


(7-20) 


 36 mg/dL


(7-20)


 


Creatinine


 


 1.2 mg/dL


(0.6-1.0) 


 1.3 mg/dL


(0.6-1.0)


 


Estimated GFR


(Cockcroft-Gault) 


 47.7 


 


 43.5 





 


Glucose Level


 


 210 mg/dL


(70-99) 


 211 mg/dL


(70-99)


 


Calcium Level


 


 8.2 mg/dL


(8.5-10.1) 


 7.8 mg/dL


(8.5-10.1)


 


Phosphorus Level


 


 2.6 mg/dL


(2.6-4.7) 


 2.8 mg/dL


(2.6-4.7)


 


Magnesium Level


 


 2.3 mg/dL


(1.8-2.4) 


 2.3 mg/dL


(1.8-2.4)


 


Test


 3/29/20


23:50 3/30/20


00:04 3/30/20


05:30 3/30/20


05:35


 


Sodium Level


 137 mmol/L


(136-145) 


 136 mmol/L


(136-145) 





 


Potassium Level


 4.4 mmol/L


(3.5-5.1) 


 4.3 mmol/L


(3.5-5.1) 





 


Chloride Level


 104 mmol/L


() 


 103 mmol/L


() 





 


Carbon Dioxide Level


 27 mmol/L


(21-32) 


 27 mmol/L


(21-32) 





 


Anion Gap 6 (6-14)   6 (6-14)  


 


Blood Urea Nitrogen


 37 mg/dL


(7-20) 


 35 mg/dL


(7-20) 





 


Creatinine


 1.3 mg/dL


(0.6-1.0) 


 1.3 mg/dL


(0.6-1.0) 





 


Estimated GFR


(Cockcroft-Gault) 43.5 


 


 43.5 


 





 


Glucose Level


 213 mg/dL


(70-99) 


 204 mg/dL


(70-99) 





 


Calcium Level


 7.9 mg/dL


(8.5-10.1) 


 8.1 mg/dL


(8.5-10.1) 





 


Phosphorus Level


 2.9 mg/dL


(2.6-4.7) 


 2.9 mg/dL


(2.6-4.7) 





 


Magnesium Level


 2.3 mg/dL


(1.8-2.4) 


 2.4 mg/dL


(1.8-2.4) 





 


Glucose (Fingerstick)


 


 177 mg/dL


(70-99) 


 187 mg/dL


(70-99)


 


White Blood Count


 


 


 45.0 x10^3/uL


(4.0-11.0) 





 


Red Blood Count


 


 


 2.20 x10^6/uL


(3.50-5.40) 





 


Hemoglobin


 


 


 7.3 g/dL


(12.0-15.5) 





 


Hematocrit


 


 


 22.0 %


(36.0-47.0) 





 


Mean Corpuscular Volume


 


 


 100 fL


() 





 


Mean Corpuscular Hemoglobin   33 pg (25-35)  


 


Mean Corpuscular Hemoglobin


Concent 


 


 33 g/dL


(31-37) 





 


Red Cell Distribution Width


 


 


 15.5 %


(11.5-14.5) 





 


Platelet Count


 


 


 383 x10^3/uL


(140-400) 





 


Neutrophils (%) (Auto)   85 % (31-73)  


 


Lymphocytes (%) (Auto)   9 % (24-48)  


 


Monocytes (%) (Auto)   4 % (0-9)  


 


Eosinophils (%) (Auto)   1 % (0-3)  


 


Basophils (%) (Auto)   0 % (0-3)  


 


Neutrophils # (Auto)


 


 


 38.4 x10^3/uL


(1.8-7.7) 





 


Lymphocytes # (Auto)


 


 


 4.0 x10^3/uL


(1.0-4.8) 





 


Monocytes # (Auto)


 


 


 2.0 x10^3/uL


(0.0-1.1) 





 


Eosinophils # (Auto)


 


 


 0.5 x10^3/uL


(0.0-0.7) 





 


Basophils # (Auto)


 


 


 0.2 x10^3/uL


(0.0-0.2) 











Problem List


Problems


Medical Problems:


(1) Acute pancreatitis


Status: Acute  





(2) Cholelithiasis


Status: Acute  








Assessment/Plan


CT planned


await results, concern for pseudocyst--very poor surgical candidate





GAMAL ZHOU MD 3/30/20 1248:


SURGICAL PROGRESS NOTE


Assessment/Plan


Pt seen and examined.


Agree with Ms. Nash's note


Pt intubated and sedated.  Remains critical.


abd distended, NTTP


agree with CT when possible.


Remains poor surgical candidate.











SAURAV NASH            Mar 30, 2020 09:26


GAMAL ZHOU MD             Mar 30, 2020 12:48

## 2020-03-30 NOTE — PDOC
Infectious Disease Note


Subjective


Subjective


Intubated FiO2 30%


No fevers last 24 hrs





ROS


ROS


no n/v/d/





Vital Sign


Vital Signs





Vital Signs








  Date Time  Temp Pulse Resp B/P (MAP) Pulse Ox O2 Delivery O2 Flow Rate FiO2


 


3/30/20 08:00 99.8 121 25 98/78 (85) 99 Ventilator  





 99.8       











Physical Exam


PHYSICAL EXAM


GENERAL: Orally intubated/sedated - generalizd anasarca 


HEENT: Mild icterus, pupils equal, ETT, NGT


NECK:  Supple. 


LUNGS:  Decreased breath sounds at the bases.


HEART:  S1, S2, regular 


ABDOMEN:  Distended, hypoactive BS, Rectal tube in place 


: [place]   


EXTREMITIES: Generalized edema, no cyanosis - some mottling, [place] boots 

bilaterally 


DERMATOLOGIC:  Warm and dry.  No generalized rash.  


CENTRAL NERVOUS SYSTEM: Sedated 


RIJ Temp HDC, RUE-PICC & LIJ - clean





Labs


Lab





Laboratory Tests








Test


 3/29/20


09:15 3/29/20


12:10 3/29/20


13:00 3/29/20


17:45


 


O2 Saturation 92 % (92-99)    


 


Arterial Blood pH


 7.38


(7.35-7.45) 


 


 





 


Arterial Blood pCO2 at


Patient Temp 41 mmHg


(35-46) 


 


 





 


Arterial Blood pO2 at Patient


Temp 72 mmHg


() 


 


 





 


Arterial Blood HCO3


 23 mmol/L


(21-28) 


 


 





 


Arterial Blood Base Excess


 -2 mmol/L


(-3-3) 


 


 





 


FiO2 40    


 


Glucose (Fingerstick)


 


 204 mg/dL


(70-99) 


 206 mg/dL


(70-99)


 


Sodium Level


 


 


 138 mmol/L


(136-145) 





 


Potassium Level


 


 


 4.4 mmol/L


(3.5-5.1) 





 


Chloride Level


 


 


 103 mmol/L


() 





 


Carbon Dioxide Level


 


 


 27 mmol/L


(21-32) 





 


Anion Gap   8 (6-14)  


 


Blood Urea Nitrogen


 


 


 34 mg/dL


(7-20) 





 


Creatinine


 


 


 1.2 mg/dL


(0.6-1.0) 





 


Estimated GFR


(Cockcroft-Gault) 


 


 47.7 


 





 


Glucose Level


 


 


 210 mg/dL


(70-99) 





 


Calcium Level


 


 


 8.2 mg/dL


(8.5-10.1) 





 


Phosphorus Level


 


 


 2.6 mg/dL


(2.6-4.7) 





 


Magnesium Level


 


 


 2.3 mg/dL


(1.8-2.4) 





 


Test


 3/29/20


18:25 3/29/20


23:50 3/30/20


00:04 3/30/20


05:30


 


Sodium Level


 138 mmol/L


(136-145) 137 mmol/L


(136-145) 


 136 mmol/L


(136-145)


 


Potassium Level


 4.3 mmol/L


(3.5-5.1) 4.4 mmol/L


(3.5-5.1) 


 4.3 mmol/L


(3.5-5.1)


 


Chloride Level


 105 mmol/L


() 104 mmol/L


() 


 103 mmol/L


()


 


Carbon Dioxide Level


 28 mmol/L


(21-32) 27 mmol/L


(21-32) 


 27 mmol/L


(21-32)


 


Anion Gap 5 (6-14)  6 (6-14)   6 (6-14) 


 


Blood Urea Nitrogen


 36 mg/dL


(7-20) 37 mg/dL


(7-20) 


 35 mg/dL


(7-20)


 


Creatinine


 1.3 mg/dL


(0.6-1.0) 1.3 mg/dL


(0.6-1.0) 


 1.3 mg/dL


(0.6-1.0)


 


Estimated GFR


(Cockcroft-Gault) 43.5 


 43.5 


 


 43.5 





 


Glucose Level


 211 mg/dL


(70-99) 213 mg/dL


(70-99) 


 204 mg/dL


(70-99)


 


Calcium Level


 7.8 mg/dL


(8.5-10.1) 7.9 mg/dL


(8.5-10.1) 


 8.1 mg/dL


(8.5-10.1)


 


Phosphorus Level


 2.8 mg/dL


(2.6-4.7) 2.9 mg/dL


(2.6-4.7) 


 2.9 mg/dL


(2.6-4.7)


 


Magnesium Level


 2.3 mg/dL


(1.8-2.4) 2.3 mg/dL


(1.8-2.4) 


 2.4 mg/dL


(1.8-2.4)


 


Glucose (Fingerstick)


 


 


 177 mg/dL


(70-99) 





 


White Blood Count


 


 


 


 45.0 x10^3/uL


(4.0-11.0)


 


Red Blood Count


 


 


 


 2.20 x10^6/uL


(3.50-5.40)


 


Hemoglobin


 


 


 


 7.3 g/dL


(12.0-15.5)


 


Hematocrit


 


 


 


 22.0 %


(36.0-47.0)


 


Mean Corpuscular Volume


 


 


 


 100 fL


()


 


Mean Corpuscular Hemoglobin    33 pg (25-35) 


 


Mean Corpuscular Hemoglobin


Concent 


 


 


 33 g/dL


(31-37)


 


Red Cell Distribution Width


 


 


 


 15.5 %


(11.5-14.5)


 


Platelet Count


 


 


 


 383 x10^3/uL


(140-400)


 


Neutrophils (%) (Auto)    85 % (31-73) 


 


Lymphocytes (%) (Auto)    9 % (24-48) 


 


Monocytes (%) (Auto)    4 % (0-9) 


 


Eosinophils (%) (Auto)    1 % (0-3) 


 


Basophils (%) (Auto)    0 % (0-3) 


 


Neutrophils # (Auto)


 


 


 


 38.4 x10^3/uL


(1.8-7.7)


 


Lymphocytes # (Auto)


 


 


 


 4.0 x10^3/uL


(1.0-4.8)


 


Monocytes # (Auto)


 


 


 


 2.0 x10^3/uL


(0.0-1.1)


 


Eosinophils # (Auto)


 


 


 


 0.5 x10^3/uL


(0.0-0.7)


 


Basophils # (Auto)


 


 


 


 0.2 x10^3/uL


(0.0-0.2)


 


Test


 3/30/20


05:35 


 


 





 


Glucose (Fingerstick)


 187 mg/dL


(70-99) 


 


 











Micro





Microbiology


3/24/20 Blood Culture - Final, Complete


          NO GROWTH AFTER 5 DAYS





Objective


Assessment


Leukocytosis - increased - ? reactive - trouble with CRRT/S/p PRBCs ? intra-

abdominal 


Fever - better - Flu neg antigen 3/26 ? reactive with pancreatitis vs ID - C-

diff neg. S/p HD cath change with malfunction 3/25 - cults 3/24 neg


Lung opacities, COVID-19 neg 


Hypotension 


Acute pancreatitis, early developing necrosis -U/S 3/26 reviewed


JED,Hyperkalemia, Metabolic acidosis on CRRT


   - s/p RIJ temporary dialysis catheter replacement, 3/25


Anasarca - worse


Acute hypoxic resp failure, intubated


? developing peripheral ischemia - better


Cholelithiasis


Anemia - S/p PRBC 3/26


Hypocalcemia 


Prediabetes


HTN





Plan


Plan of Care


Continue Dapto/cefepime (3/25), Flagyl and micafungin (3/23) 


   -Previously on Merrem, Zyvox 


F/u Blood cults 3/24 so far neg


CT ordered abd/pelvis but on CRRT will need when stable


No surgical plans at this time


Maintain aspiration precautions 


Airborne isolation d/c'd. COVID-19 neg  


Repeat CBC





need ct chest, abd and pelvis


D/w nursing





Critically ill











LINN FRANZ MD               Mar 30, 2020 08:59

## 2020-03-30 NOTE — PDOC
PULMONARY PROGRESS NOTES


Subjective


No major events overnight, patient currently on norepinephrine





TPN for nutrition


Patient intubated on 3/23 , sedated


Currently on assist control ventilation rate of 25 450 tidal volume 8 of PEEP 

currently undergoing CRRT


Vitals





Vital Signs








  Date Time  Temp Pulse Resp B/P (MAP) Pulse Ox O2 Delivery O2 Flow Rate FiO2


 


3/30/20 08:00 99.8 121 25 98/78 (85) 99 Ventilator  





 99.8       








HEENT:  Other (nc at perrl   bipap mask on   neck no lad   no thyromegaly)


Lungs:  Crackles


Cardiovascular:  S1, S2


Abdomen:  Other (distended,  diffuse pain   no mass)


Extremities:  No Edema


Skin:  Warm


Labs





Laboratory Tests








Test


 3/28/20


11:30 3/28/20


11:45 3/28/20


17:47 3/28/20


17:48


 


Sodium Level


 139 mmol/L


(136-145) 


 


 138 mmol/L


(136-145)


 


Potassium Level


 4.4 mmol/L


(3.5-5.1) 


 


 4.6 mmol/L


(3.5-5.1)


 


Chloride Level


 105 mmol/L


() 


 


 105 mmol/L


()


 


Carbon Dioxide Level


 28 mmol/L


(21-32) 


 


 27 mmol/L


(21-32)


 


Anion Gap 6 (6-14)    6 (6-14) 


 


Blood Urea Nitrogen


 38 mg/dL


(7-20) 


 


 41 mg/dL


(7-20)


 


Creatinine


 1.3 mg/dL


(0.6-1.0) 


 


 1.4 mg/dL


(0.6-1.0)


 


Estimated GFR


(Cockcroft-Gault) 43.5 


 


 


 40.0 





 


Glucose Level


 235 mg/dL


(70-99) 


 


 233 mg/dL


(70-99)


 


Calcium Level


 8.1 mg/dL


(8.5-10.1) 


 


 8.0 mg/dL


(8.5-10.1)


 


Phosphorus Level


 3.3 mg/dL


(2.6-4.7) 


 


 3.6 mg/dL


(2.6-4.7)


 


Magnesium Level


 2.2 mg/dL


(1.8-2.4) 


 


 2.1 mg/dL


(1.8-2.4)


 


Glucose (Fingerstick)


 


 231 mg/dL


(70-99) 231 mg/dL


(70-99) 





 


Test


 3/28/20


23:30 3/29/20


05:40 3/29/20


05:41 3/29/20


09:15


 


Sodium Level


 138 mmol/L


(136-145) 138 mmol/L


(136-145) 


 





 


Potassium Level


 4.3 mmol/L


(3.5-5.1) 4.6 mmol/L


(3.5-5.1) 


 





 


Chloride Level


 105 mmol/L


() 105 mmol/L


() 


 





 


Carbon Dioxide Level


 26 mmol/L


(21-32) 28 mmol/L


(21-32) 


 





 


Anion Gap 7 (6-14)  5 (6-14)   


 


Blood Urea Nitrogen


 38 mg/dL


(7-20) 36 mg/dL


(7-20) 


 





 


Creatinine


 1.3 mg/dL


(0.6-1.0) 1.3 mg/dL


(0.6-1.0) 


 





 


Estimated GFR


(Cockcroft-Gault) 43.5 


 43.5 


 


 





 


Glucose Level


 217 mg/dL


(70-99) 202 mg/dL


(70-99) 


 





 


Glucose (Fingerstick)


 199 mg/dL


(70-99) 


 189 mg/dL


(70-99) 





 


Calcium Level


 8.1 mg/dL


(8.5-10.1) 8.2 mg/dL


(8.5-10.1) 


 





 


Phosphorus Level


 3.3 mg/dL


(2.6-4.7) 3.5 mg/dL


(2.6-4.7) 


 





 


Magnesium Level


 2.3 mg/dL


(1.8-2.4) 2.3 mg/dL


(1.8-2.4) 


 





 


White Blood Count


 


 38.7 x10^3/uL


(4.0-11.0) 


 





 


Red Blood Count


 


 2.10 x10^6/uL


(3.50-5.40) 


 





 


Hemoglobin


 


 6.8 g/dL


(12.0-15.5) 


 





 


Hematocrit


 


 20.7 %


(36.0-47.0) 


 





 


Mean Corpuscular Volume  99 fL ()   


 


Mean Corpuscular Hemoglobin  32 pg (25-35)   


 


Mean Corpuscular Hemoglobin


Concent 


 33 g/dL


(31-37) 


 





 


Red Cell Distribution Width


 


 15.7 %


(11.5-14.5) 


 





 


Platelet Count


 


 319 x10^3/uL


(140-400) 


 





 


Neutrophils (%) (Auto)  90 % (31-73)   


 


Lymphocytes (%) (Auto)  5 % (24-48)   


 


Monocytes (%) (Auto)  4 % (0-9)   


 


Eosinophils (%) (Auto)  1 % (0-3)   


 


Basophils (%) (Auto)  1 % (0-3)   


 


Neutrophils # (Auto)


 


 34.7 x10^3/uL


(1.8-7.7) 


 





 


Lymphocytes # (Auto)


 


 1.9 x10^3/uL


(1.0-4.8) 


 





 


Monocytes # (Auto)


 


 1.4 x10^3/uL


(0.0-1.1) 


 





 


Eosinophils # (Auto)


 


 0.5 x10^3/uL


(0.0-0.7) 


 





 


Basophils # (Auto)


 


 0.2 x10^3/uL


(0.0-0.2) 


 





 


O2 Saturation    92 % (92-99) 


 


Arterial Blood pH


 


 


 


 7.38


(7.35-7.45)


 


Arterial Blood pCO2 at


Patient Temp 


 


 


 41 mmHg


(35-46)


 


Arterial Blood pO2 at Patient


Temp 


 


 


 72 mmHg


()


 


Arterial Blood HCO3


 


 


 


 23 mmol/L


(21-28)


 


Arterial Blood Base Excess


 


 


 


 -2 mmol/L


(-3-3)


 


FiO2    40 


 


Test


 3/29/20


12:10 3/29/20


13:00 3/29/20


17:45 3/29/20


18:25


 


Glucose (Fingerstick)


 204 mg/dL


(70-99) 


 206 mg/dL


(70-99) 





 


Sodium Level


 


 138 mmol/L


(136-145) 


 138 mmol/L


(136-145)


 


Potassium Level


 


 4.4 mmol/L


(3.5-5.1) 


 4.3 mmol/L


(3.5-5.1)


 


Chloride Level


 


 103 mmol/L


() 


 105 mmol/L


()


 


Carbon Dioxide Level


 


 27 mmol/L


(21-32) 


 28 mmol/L


(21-32)


 


Anion Gap  8 (6-14)   5 (6-14) 


 


Blood Urea Nitrogen


 


 34 mg/dL


(7-20) 


 36 mg/dL


(7-20)


 


Creatinine


 


 1.2 mg/dL


(0.6-1.0) 


 1.3 mg/dL


(0.6-1.0)


 


Estimated GFR


(Cockcroft-Gault) 


 47.7 


 


 43.5 





 


Glucose Level


 


 210 mg/dL


(70-99) 


 211 mg/dL


(70-99)


 


Calcium Level


 


 8.2 mg/dL


(8.5-10.1) 


 7.8 mg/dL


(8.5-10.1)


 


Phosphorus Level


 


 2.6 mg/dL


(2.6-4.7) 


 2.8 mg/dL


(2.6-4.7)


 


Magnesium Level


 


 2.3 mg/dL


(1.8-2.4) 


 2.3 mg/dL


(1.8-2.4)


 


Test


 3/29/20


23:50 3/30/20


00:04 3/30/20


05:30 3/30/20


05:35


 


Sodium Level


 137 mmol/L


(136-145) 


 136 mmol/L


(136-145) 





 


Potassium Level


 4.4 mmol/L


(3.5-5.1) 


 4.3 mmol/L


(3.5-5.1) 





 


Chloride Level


 104 mmol/L


() 


 103 mmol/L


() 





 


Carbon Dioxide Level


 27 mmol/L


(21-32) 


 27 mmol/L


(21-32) 





 


Anion Gap 6 (6-14)   6 (6-14)  


 


Blood Urea Nitrogen


 37 mg/dL


(7-20) 


 35 mg/dL


(7-20) 





 


Creatinine


 1.3 mg/dL


(0.6-1.0) 


 1.3 mg/dL


(0.6-1.0) 





 


Estimated GFR


(Cockcroft-Gault) 43.5 


 


 43.5 


 





 


Glucose Level


 213 mg/dL


(70-99) 


 204 mg/dL


(70-99) 





 


Calcium Level


 7.9 mg/dL


(8.5-10.1) 


 8.1 mg/dL


(8.5-10.1) 





 


Phosphorus Level


 2.9 mg/dL


(2.6-4.7) 


 2.9 mg/dL


(2.6-4.7) 





 


Magnesium Level


 2.3 mg/dL


(1.8-2.4) 


 2.4 mg/dL


(1.8-2.4) 





 


Glucose (Fingerstick)


 


 177 mg/dL


(70-99) 


 187 mg/dL


(70-99)


 


White Blood Count


 


 


 45.0 x10^3/uL


(4.0-11.0) 





 


Red Blood Count


 


 


 2.20 x10^6/uL


(3.50-5.40) 





 


Hemoglobin


 


 


 7.3 g/dL


(12.0-15.5) 





 


Hematocrit


 


 


 22.0 %


(36.0-47.0) 





 


Mean Corpuscular Volume


 


 


 100 fL


() 





 


Mean Corpuscular Hemoglobin   33 pg (25-35)  


 


Mean Corpuscular Hemoglobin


Concent 


 


 33 g/dL


(31-37) 





 


Red Cell Distribution Width


 


 


 15.5 %


(11.5-14.5) 





 


Platelet Count


 


 


 383 x10^3/uL


(140-400) 





 


Neutrophils (%) (Auto)   85 % (31-73)  


 


Lymphocytes (%) (Auto)   9 % (24-48)  


 


Monocytes (%) (Auto)   4 % (0-9)  


 


Eosinophils (%) (Auto)   1 % (0-3)  


 


Basophils (%) (Auto)   0 % (0-3)  


 


Neutrophils # (Auto)


 


 


 38.4 x10^3/uL


(1.8-7.7) 





 


Lymphocytes # (Auto)


 


 


 4.0 x10^3/uL


(1.0-4.8) 





 


Monocytes # (Auto)


 


 


 2.0 x10^3/uL


(0.0-1.1) 





 


Eosinophils # (Auto)


 


 


 0.5 x10^3/uL


(0.0-0.7) 





 


Basophils # (Auto)


 


 


 0.2 x10^3/uL


(0.0-0.2) 











Laboratory Tests








Test


 3/29/20


09:15 3/29/20


12:10 3/29/20


13:00 3/29/20


17:45


 


O2 Saturation 92 % (92-99)    


 


Arterial Blood pH


 7.38


(7.35-7.45) 


 


 





 


Arterial Blood pCO2 at


Patient Temp 41 mmHg


(35-46) 


 


 





 


Arterial Blood pO2 at Patient


Temp 72 mmHg


() 


 


 





 


Arterial Blood HCO3


 23 mmol/L


(21-28) 


 


 





 


Arterial Blood Base Excess


 -2 mmol/L


(-3-3) 


 


 





 


FiO2 40    


 


Glucose (Fingerstick)


 


 204 mg/dL


(70-99) 


 206 mg/dL


(70-99)


 


Sodium Level


 


 


 138 mmol/L


(136-145) 





 


Potassium Level


 


 


 4.4 mmol/L


(3.5-5.1) 





 


Chloride Level


 


 


 103 mmol/L


() 





 


Carbon Dioxide Level


 


 


 27 mmol/L


(21-32) 





 


Anion Gap   8 (6-14)  


 


Blood Urea Nitrogen


 


 


 34 mg/dL


(7-20) 





 


Creatinine


 


 


 1.2 mg/dL


(0.6-1.0) 





 


Estimated GFR


(Cockcroft-Gault) 


 


 47.7 


 





 


Glucose Level


 


 


 210 mg/dL


(70-99) 





 


Calcium Level


 


 


 8.2 mg/dL


(8.5-10.1) 





 


Phosphorus Level


 


 


 2.6 mg/dL


(2.6-4.7) 





 


Magnesium Level


 


 


 2.3 mg/dL


(1.8-2.4) 





 


Test


 3/29/20


18:25 3/29/20


23:50 3/30/20


00:04 3/30/20


05:30


 


Sodium Level


 138 mmol/L


(136-145) 137 mmol/L


(136-145) 


 136 mmol/L


(136-145)


 


Potassium Level


 4.3 mmol/L


(3.5-5.1) 4.4 mmol/L


(3.5-5.1) 


 4.3 mmol/L


(3.5-5.1)


 


Chloride Level


 105 mmol/L


() 104 mmol/L


() 


 103 mmol/L


()


 


Carbon Dioxide Level


 28 mmol/L


(21-32) 27 mmol/L


(21-32) 


 27 mmol/L


(21-32)


 


Anion Gap 5 (6-14)  6 (6-14)   6 (6-14) 


 


Blood Urea Nitrogen


 36 mg/dL


(7-20) 37 mg/dL


(7-20) 


 35 mg/dL


(7-20)


 


Creatinine


 1.3 mg/dL


(0.6-1.0) 1.3 mg/dL


(0.6-1.0) 


 1.3 mg/dL


(0.6-1.0)


 


Estimated GFR


(Cockcroft-Gault) 43.5 


 43.5 


 


 43.5 





 


Glucose Level


 211 mg/dL


(70-99) 213 mg/dL


(70-99) 


 204 mg/dL


(70-99)


 


Calcium Level


 7.8 mg/dL


(8.5-10.1) 7.9 mg/dL


(8.5-10.1) 


 8.1 mg/dL


(8.5-10.1)


 


Phosphorus Level


 2.8 mg/dL


(2.6-4.7) 2.9 mg/dL


(2.6-4.7) 


 2.9 mg/dL


(2.6-4.7)


 


Magnesium Level


 2.3 mg/dL


(1.8-2.4) 2.3 mg/dL


(1.8-2.4) 


 2.4 mg/dL


(1.8-2.4)


 


Glucose (Fingerstick)


 


 


 177 mg/dL


(70-99) 





 


White Blood Count


 


 


 


 45.0 x10^3/uL


(4.0-11.0)


 


Red Blood Count


 


 


 


 2.20 x10^6/uL


(3.50-5.40)


 


Hemoglobin


 


 


 


 7.3 g/dL


(12.0-15.5)


 


Hematocrit


 


 


 


 22.0 %


(36.0-47.0)


 


Mean Corpuscular Volume


 


 


 


 100 fL


()


 


Mean Corpuscular Hemoglobin    33 pg (25-35) 


 


Mean Corpuscular Hemoglobin


Concent 


 


 


 33 g/dL


(31-37)


 


Red Cell Distribution Width


 


 


 


 15.5 %


(11.5-14.5)


 


Platelet Count


 


 


 


 383 x10^3/uL


(140-400)


 


Neutrophils (%) (Auto)    85 % (31-73) 


 


Lymphocytes (%) (Auto)    9 % (24-48) 


 


Monocytes (%) (Auto)    4 % (0-9) 


 


Eosinophils (%) (Auto)    1 % (0-3) 


 


Basophils (%) (Auto)    0 % (0-3) 


 


Neutrophils # (Auto)


 


 


 


 38.4 x10^3/uL


(1.8-7.7)


 


Lymphocytes # (Auto)


 


 


 


 4.0 x10^3/uL


(1.0-4.8)


 


Monocytes # (Auto)


 


 


 


 2.0 x10^3/uL


(0.0-1.1)


 


Eosinophils # (Auto)


 


 


 


 0.5 x10^3/uL


(0.0-0.7)


 


Basophils # (Auto)


 


 


 


 0.2 x10^3/uL


(0.0-0.2)


 


Test


 3/30/20


05:35 


 


 





 


Glucose (Fingerstick)


 187 mg/dL


(70-99) 


 


 











Medications





Active Scripts








 Medications  Dose


 Route/Sig


 Max Daily Dose Days Date Category


 


 Bisoprolol


 Fumarate 5 Mg


 Tablet  10 Mg


 PO DAILY


   3/16/20 Reported








Comments


Chest x-ray





Impression


.


IMPRESSION:


1.  Acute hypoxemic respiratory failure secondary to ARDS due toacute 

pancreatitis, sepsic shock, abdominal distention, and pneumonia.and pleural effu

sions.  Pleural effusions secondary to abdominal process and/or general volume 

overload from renal failure.


2.  Gallstone pancreatitis. WITH NECROSIS


3.  Severe metabolic acidosis.


4.  Acute kidney injury. ON CRRT


5.  Acute gallstone pancreatitis.


6.  Hypoalbuminemia.


7.  Hypocalcemia.


8.  Leukocytosis


9.  Chronic anemia


10. Covid 19 testing negative





Plan


.


Undergoing a repeat CT chest, abdomen, pelvis,


Not ready for trial


Cont AC mode , 40 FIO2/ 8 PEEP.  


Poor prognosis/ ARDS/septic shock


supportive care


CRRT


Antibiotics per ID


Follow nephrology input


Nutritional support with TPN


Prognosis is extremely poor


d/w RN/











STEVE MIRANDA MD              Mar 30, 2020 08:31

## 2020-03-30 NOTE — RAD
EXAM: CT Chest, Abdomen, and Pelvis without IV contrast

 

INDICATION: Pancreatitis and elevated white blood cell count.

 

TECHNIQUE:  Multi-detector row CT images were acquired from the thoracic 

inlet through the ischial tuberosities without the use of IV contrast. 

Sagittal and coronal images were acquired from the transaxial data. All CT

scans performed at this facility utilize dose optimization techniques as 

appropriate to the exam, including the following: Automated exposure 

control and adjustment of the mA and/or KV according to patient size (this

includes techniques or standardized protocols for targeted exams where 

dose is indication/reason for exam).

 

ORAL CONTRAST: Administered

 

COMPARISON: CT abdomen and pelvis with IV contrast 3/17/2020 and 3/16/2020

chest, abdomen and pelvis CT.

 

FINDINGS: 

 

The absence of IV contrast limits evaluation of soft tissue pathology.

 

CHEST:

CARDIOVASCULAR:  Unremarkable

 

MEDIASTINUM & LORENA: Interval endotracheal intubation. Right PICC line 

terminates in the SVC. Left jugular approach central venous catheter 

terminates at the distal SVC. The larger bore right jugular approach 

central venous catheter terminates in the proximal to mid SVC. The 

thoracic esophagus contains an enteric tube terminating in the gastric 

body. Oral contrast has been administered and this refluxes up the 

thoracic esophagus to the cervicothoracic junction. The bilateral lorena are

obscured by parenchymal lung consolidation probably involving the lower 

lobes and the right middle lobe. No mediastinal adenopathy or mass is 

apparent.

 

LUNGS: Posterior left upper lobe consolidation with rounded contours is 

suggestive of rounded atelectasis. Lobar atelectasis of the right middle 

lobe is also present along with posterior lateral basilar right lower lobe

atelectasis. The left lung is diffusely consolidated with air bronchograms

and patchy atelectatic changes are evident in the left lung apex.

 

PLEURAL SPACE: Moderate bilateral pleural effusions are present, slightly 

increased in depth bilaterally.

 

OSSEOUS & SOFT TISSUE: Bones are unremarkable. There is generalized 

anasarca with subcutaneous edema throughout the subcutaneous soft tissues 

and in the mesentery, extensive edema.. Bilateral breast implants 

redemonstrated.

 

ABDOMEN/PELVIS:

LIVER:  Unremarkable

 

BILIARY SYSTEM: Multiple gallstones are present. The gallbladder is 

obscured by perihepatic ascites. Bile ducts are not dilated.

 

PANCREAS:  Pancreas is surrounded by slightly larger amount of 

peripancreatic fluid. No intraparenchymal gas is evident.

 

SPLEEN:  Unremarkable

 

ADRENALS:  Unremarkable

 

KIDNEYS & URETERS:  Unremarkable

 

BLADDER:  [place] catheter is present. The urinary bladder is obscured by 

ascites and may be completely collapsed/empty.

 

REPRODUCTIVE ORGANS:  Unremarkable

 

GASTROINTESTINAL: No findings of bowel obstruction or perforation. A 

rectal tube is present. There is more fluid in the upper abdominal 

mesentery, now exerting some mass effect on the gastric fundus and 

proximal body.

 

MESENTERY/PERITONEUM/RETROPERITONEUM: Moderate amount of free 

intraperitoneal fluid is now present. In the pelvis, this fluid shows a 

fluid fluid level that is suspicious for a hematocrit level.

 

VASCULAR:  Unremarkable

 

LYMPH NODES:  No adenopathy

 

OSSEOUS & SOFT TISSUES:  Unremarkable

 

IMPRESSION:

 

Acute pancreatitis with developing anasarca, including worsening 

mesenteric edema and developing hemorrhagic ascites. Multiple supportive 

lines and tubes have been placed in the interval, consistent with a 

critically acute illness.

 

Electronically signed by: Adriana Forrest MD (3/30/2020 2:13 PM) 

[id number]

## 2020-03-30 NOTE — PDOC
Renal-Progress Notes


Subjective Notes


Notes


ON THE VENT





History of Present Illness


Hx of present illness


CRITICALLY ILL





Vitals


Vitals





Vital Signs








  Date Time  Temp Pulse Resp B/P (MAP) Pulse Ox O2 Delivery O2 Flow Rate FiO2


 


3/30/20 11:00  114 24 102/62 (75) 100 Ventilator  


 


3/30/20 08:00 99.8       





 99.8       








Weight


Weight [ ]





I.O.


Intake and Output











Intake and Output 


 


 3/30/20





 07:00


 


Intake Total 2269 ml


 


Output Total 490 ml


 


Balance 1779 ml


 


 


 


IV Total 2269 ml


 


Output Urine Total 390 ml


 


Gastric Drainage Total 100 ml











Labs


Labs





Laboratory Tests








Test


 3/29/20


12:10 3/29/20


13:00 3/29/20


17:45 3/29/20


18:25


 


Glucose (Fingerstick)


 204 mg/dL


(70-99) 


 206 mg/dL


(70-99) 





 


Sodium Level


 


 138 mmol/L


(136-145) 


 138 mmol/L


(136-145)


 


Potassium Level


 


 4.4 mmol/L


(3.5-5.1) 


 4.3 mmol/L


(3.5-5.1)


 


Chloride Level


 


 103 mmol/L


() 


 105 mmol/L


()


 


Carbon Dioxide Level


 


 27 mmol/L


(21-32) 


 28 mmol/L


(21-32)


 


Anion Gap  8 (6-14)   5 (6-14) 


 


Blood Urea Nitrogen


 


 34 mg/dL


(7-20) 


 36 mg/dL


(7-20)


 


Creatinine


 


 1.2 mg/dL


(0.6-1.0) 


 1.3 mg/dL


(0.6-1.0)


 


Estimated GFR


(Cockcroft-Gault) 


 47.7 


 


 43.5 





 


Glucose Level


 


 210 mg/dL


(70-99) 


 211 mg/dL


(70-99)


 


Calcium Level


 


 8.2 mg/dL


(8.5-10.1) 


 7.8 mg/dL


(8.5-10.1)


 


Phosphorus Level


 


 2.6 mg/dL


(2.6-4.7) 


 2.8 mg/dL


(2.6-4.7)


 


Magnesium Level


 


 2.3 mg/dL


(1.8-2.4) 


 2.3 mg/dL


(1.8-2.4)


 


Test


 3/29/20


23:50 3/30/20


00:04 3/30/20


05:30 3/30/20


05:35


 


Sodium Level


 137 mmol/L


(136-145) 


 136 mmol/L


(136-145) 





 


Potassium Level


 4.4 mmol/L


(3.5-5.1) 


 4.3 mmol/L


(3.5-5.1) 





 


Chloride Level


 104 mmol/L


() 


 103 mmol/L


() 





 


Carbon Dioxide Level


 27 mmol/L


(21-32) 


 27 mmol/L


(21-32) 





 


Anion Gap 6 (6-14)   6 (6-14)  


 


Blood Urea Nitrogen


 37 mg/dL


(7-20) 


 35 mg/dL


(7-20) 





 


Creatinine


 1.3 mg/dL


(0.6-1.0) 


 1.3 mg/dL


(0.6-1.0) 





 


Estimated GFR


(Cockcroft-Gault) 43.5 


 


 43.5 


 





 


Glucose Level


 213 mg/dL


(70-99) 


 204 mg/dL


(70-99) 





 


Calcium Level


 7.9 mg/dL


(8.5-10.1) 


 8.1 mg/dL


(8.5-10.1) 





 


Phosphorus Level


 2.9 mg/dL


(2.6-4.7) 


 2.9 mg/dL


(2.6-4.7) 





 


Magnesium Level


 2.3 mg/dL


(1.8-2.4) 


 2.4 mg/dL


(1.8-2.4) 





 


Glucose (Fingerstick)


 


 177 mg/dL


(70-99) 


 187 mg/dL


(70-99)


 


White Blood Count


 


 


 45.0 x10^3/uL


(4.0-11.0) 





 


Red Blood Count


 


 


 2.20 x10^6/uL


(3.50-5.40) 





 


Hemoglobin


 


 


 7.3 g/dL


(12.0-15.5) 





 


Hematocrit


 


 


 22.0 %


(36.0-47.0) 





 


Mean Corpuscular Volume


 


 


 100 fL


() 





 


Mean Corpuscular Hemoglobin   33 pg (25-35)  


 


Mean Corpuscular Hemoglobin


Concent 


 


 33 g/dL


(31-37) 





 


Red Cell Distribution Width


 


 


 15.5 %


(11.5-14.5) 





 


Platelet Count


 


 


 383 x10^3/uL


(140-400) 





 


Neutrophils (%) (Auto)   85 % (31-73)  


 


Lymphocytes (%) (Auto)   9 % (24-48)  


 


Monocytes (%) (Auto)   4 % (0-9)  


 


Eosinophils (%) (Auto)   1 % (0-3)  


 


Basophils (%) (Auto)   0 % (0-3)  


 


Neutrophils # (Auto)


 


 


 38.4 x10^3/uL


(1.8-7.7) 





 


Lymphocytes # (Auto)


 


 


 4.0 x10^3/uL


(1.0-4.8) 





 


Monocytes # (Auto)


 


 


 2.0 x10^3/uL


(0.0-1.1) 





 


Eosinophils # (Auto)


 


 


 0.5 x10^3/uL


(0.0-0.7) 





 


Basophils # (Auto)


 


 


 0.2 x10^3/uL


(0.0-0.2) 





 


Segmented Neutrophils %   68 % (35-66)  


 


Band Neutrophils %   19 % (0-9)  


 


Lymphocytes %   6 % (24-48)  


 


Monocytes %   5 % (0-10)  


 


Eosinophils %   1 % (0-5)  


 


Myelocytes %   1 % (0-0)  


 


Nucleated Red Blood Cells   4  


 


Toxic Granulation   Present  


 


Platelet Estimate


 


 


 Adequate


(ADEQUATE) 





 


Large Platelets   Present  


 


Polychromasia   Present  


 


Anisocytosis   Slight  


 


Macrocytosis   Present  











Micro


Micro





Microbiology


3/24/20 Blood Culture - Final, Complete


          NO GROWTH AFTER 5 DAYS





Review of Systems


Constitutional:  yes: other (ON THE VENT)





Physical Exam


General Appearance:  moderate distress, other (ON THE VENT)


Skin:  warm


Respiratory:  decreased breath sounds


Heart:  S1S2


Abdomen:  soft, bowel sounds present


Genitourinary:  bladder flat


Extremities:  pulses present


Neurology:  other (SEDATED)





Assessment


Assessment


IMP





JON-ABN-KRZNCC-CR OF 1.3 ON CRRT


LEUCOCYTOSIS


HYPERKALEMIA-RESOLVED


ACIDOSIS AND ACIDEMIA-CONTROLLED


ACUTE REPS FAILURE


ACUTE PANCREATITIS


HYPOALBUMINEMIA


HYPOCALCEMIA-BETTER





PLAN





TPN


CONT WITH CRRT-CHANGE PFR TO ZERO


CONT SAME RF AND DIALYSATE


CT PENDING


VENT SUPPORT


PRESSORS AS NEEDED


ANTIBIOTICS


WILL FOLLOW











BETH HEIN MD                 Mar 30, 2020 11:21

## 2020-03-30 NOTE — PDOC
Objective:


Objective:


COVID-19 neg per staff.


CT C/A/P ordered.


Vital Signs:





                                   Vital Signs








  Date Time  Temp Pulse Resp B/P (MAP) Pulse Ox O2 Delivery O2 Flow Rate FiO2


 


3/30/20 11:00  114 24 102/62 (75) 100 Ventilator  


 


3/30/20 08:00 99.8       





 99.8       








Labs:





Laboratory Tests








Test


 3/29/20


12:10 3/29/20


13:00 3/29/20


17:45 3/29/20


18:25


 


Glucose (Fingerstick) 204 mg/dL   206 mg/dL  


 


Sodium Level  138 mmol/L   138 mmol/L 


 


Potassium Level  4.4 mmol/L   4.3 mmol/L 


 


Chloride Level  103 mmol/L   105 mmol/L 


 


Carbon Dioxide Level  27 mmol/L   28 mmol/L 


 


Anion Gap  8   5 


 


Blood Urea Nitrogen  34 mg/dL   36 mg/dL 


 


Creatinine  1.2 mg/dL   1.3 mg/dL 


 


Estimated GFR


(Cockcroft-Gault) 


 47.7 


 


 43.5 





 


Glucose Level  210 mg/dL   211 mg/dL 


 


Calcium Level  8.2 mg/dL   7.8 mg/dL 


 


Phosphorus Level  2.6 mg/dL   2.8 mg/dL 


 


Magnesium Level  2.3 mg/dL   2.3 mg/dL 


 


Test


 3/29/20


23:50 3/30/20


00:04 3/30/20


05:30 3/30/20


05:35


 


Sodium Level 137 mmol/L   136 mmol/L  


 


Potassium Level 4.4 mmol/L   4.3 mmol/L  


 


Chloride Level 104 mmol/L   103 mmol/L  


 


Carbon Dioxide Level 27 mmol/L   27 mmol/L  


 


Anion Gap 6   6  


 


Blood Urea Nitrogen 37 mg/dL   35 mg/dL  


 


Creatinine 1.3 mg/dL   1.3 mg/dL  


 


Estimated GFR


(Cockcroft-Gault) 43.5 


 


 43.5 


 





 


Glucose Level 213 mg/dL   204 mg/dL  


 


Calcium Level 7.9 mg/dL   8.1 mg/dL  


 


Phosphorus Level 2.9 mg/dL   2.9 mg/dL  


 


Magnesium Level 2.3 mg/dL   2.4 mg/dL  


 


Glucose (Fingerstick)  177 mg/dL   187 mg/dL 


 


White Blood Count   45.0 x10^3/uL  


 


Red Blood Count   2.20 x10^6/uL  


 


Hemoglobin   7.3 g/dL  


 


Hematocrit   22.0 %  


 


Mean Corpuscular Volume   100 fL  


 


Mean Corpuscular Hemoglobin   33 pg  


 


Mean Corpuscular Hemoglobin


Concent 


 


 33 g/dL 


 





 


Red Cell Distribution Width   15.5 %  


 


Platelet Count   383 x10^3/uL  


 


Neutrophils (%) (Auto)   85 %  


 


Lymphocytes (%) (Auto)   9 %  


 


Monocytes (%) (Auto)   4 %  


 


Eosinophils (%) (Auto)   1 %  


 


Basophils (%) (Auto)   0 %  


 


Neutrophils # (Auto)   38.4 x10^3/uL  


 


Lymphocytes # (Auto)   4.0 x10^3/uL  


 


Monocytes # (Auto)   2.0 x10^3/uL  


 


Eosinophils # (Auto)   0.5 x10^3/uL  


 


Basophils # (Auto)   0.2 x10^3/uL  


 


Segmented Neutrophils %   68 %  


 


Band Neutrophils %   19 %  


 


Lymphocytes %   6 %  


 


Monocytes %   5 %  


 


Eosinophils %   1 %  


 


Myelocytes %   1 %  


 


Nucleated Red Blood Cells   4  


 


Toxic Granulation   Present  


 


Platelet Estimate   Adequate  


 


Large Platelets   Present  


 


Polychromasia   Present  


 


Anisocytosis   Slight  


 


Macrocytosis   Present  








Imaging:


CXR 3/30


Impression: 


1.  There are persistent bilateral pleural effusions with adjacent airspace 

opacity, some questionable slight improved aeration left lung base. There are 

support catheters and tubes as stated.





PE:





GEN: intubated, dialyzing


LUNGS: vent, clear


HEART: tachycardic


ABD: firm, rectal tube dark liquid, NG bilious


NEURO/PSYCH: sedated





A/P:


Gallstone pancreatitis, MOSF


Leukocytosis - WBC 45





--


CT odered, await this.





Hemodynamically unstable?:  Yes


Is patient in severe pain?:  Yes


Is NPO status required?:  Yes











RAGHAVENDRA MURCIA         Mar 30, 2020 11:23

## 2020-03-30 NOTE — PDOC
[facility] [facility] PROGRESS NOTE


Chief Complaint


Chief Complaint


Respiratory failure requiring mechanical ventilation


Severe Acute pancreatitis


Acute kidney failure now requiring dialysis


Salpingo--itis


Gallstones (Calculus of gallbladder with acute cholecystitis without 

obstruction)


HTN 


Leukocytosis 


Hypoxia


Uterine fibroid


Hypoxia with respiratory failure


Intractable pain


Intractable nausea


Possible Covid 19?





History of Present Illness


History of Present Illness


[id number]


Patient seen and examined in the ICU


She remains very critically ill


Going for a CT of the abdomen chest and pelvis


Ventilated : On assist control 40% FiO2


Discussed with RN


Chart reviewed








[id number]


Patient seen and examined in the ICU


She is still on CRRT although we plan to change to hemodialysis soon


Chart reviewed


Discussed with RN


Hemoglobin down to 6.9 (suspect CRRT related , Will let nephrology consider 

transfusion while on dialysis)








[id number]


Patient seen and examined in the ICU


She is mechanically ventilated


Before meals/25/450/30%


Also on CRRT


Very critically ill


Chart reviewed


Discussed with RN











[id number]


Patient seen and examined in the ICU


She is now been transferred to the Covid 19 unit so we can rule out Covid and 

keep her in isolation


Very critically ill


Intubated and  ventilated


Assist control 40%


Chart reviewed


Discussed with RN


Still on CRRT


Getting a chest x-ray now


Very concerned about her prognosis








[id number]


Patient seen and examined in the ICU


She is extremely critically ill


Remains mechanically ventilated with assist control/25/450/40%


Also on continuous renal replacement therapy


Chart reviewed


Discussed with RN


She has TPN hanging


Also on pressors











[id number]


Patient seen and examined in the ICU


She is still requiring CRRT


On the vent with assist control but her FiO2 is down to 40% from yesterday


Slightly better but still very critically ill


Discussed with RN


Chart reviewed








[id number]


Patient seen and examined in the ICU


She remains extremely critically ill


On IV fentanyl IV Versed IV Doxy


On the vent


Assist-control/25/450/70%


She is critically ill


Discussed with RN


Chart reviewed


Patient is currently on CRRT as well








[id number]


Patient seen and examined in the ICU


She had to be intubated this morning


On assist-control 25/450/100% with 10 of PEEP and only satting 87%


She is extremely critically ill


I'm not sure if she will survive


Chart reviewed


Discussed with RN











Ms Tadeo is a 50yo F w/ PMHx HTN, prediabetes who presents the emergency room

complaints of abdominal pain. Patient described off and on 3 days. She states is

constant, described as a squeezing sensation in a band-like distribution. + 

nausea, vomiting.  She denies any fever or diarrhea.  Patient denies any 

abdominal surgical procedures.  She states is worse with movements, car ride.  

Pain initially was upper abdomen however now pretty much generalized.  Last 

bowel movement was 3/15/2020. Nothing makes her pain better.  Patient denies any

shortness of breath.  She does state the pain moves into her chest.  Denies any 

headache or visual changes.


Lipase 15262, , , Bilirubin 1.4.


CT abdomen confirms pancreatic inflammation, peripancreatic fluid and 

inflammatory changes around the pancreas consistent with pancreatitis. C

holelithiasis and 1.4cm uterine fibroid as well as possible left salpingitis. 

Admitted for further care


GI, General surgery, ID, Pulm consulted.





3/17: Overnight per report no urine output. Added dilaudid for pain, PICC placed

per IR. Renal US negative.Seen bedside in ICU, given 2L additional NSS and 

albumin infusion. Still hypotensive, started on levophed. Repeat CT abdomen with

necrosis. Updated her fiancee


3/18: Sats are only 87% on nasal cannula oxygen. Dialysis catheter per 

nephrology


3/19: She is now on BiPAP appears more ill, now on dialysis


3/20: Seen on BiPAP. Her mother and another family member are present and seemed

to be good support for her. Currently on dialysis. Appears critically ill


3/21: Overnight Tmax 101.7 , still on BiPAP FiO2 40%, still on low dose Levophed

gtt, TPN initiated. On dialysis





Overnight still febrile. Dialysis today. She wakes up and responds to pain. No 

CP.





Vitals/I&O


Vitals/I&O:





                                   Vital Signs








  Date Time  Temp Pulse Resp B/P (MAP) Pulse Ox O2 Delivery O2 Flow Rate FiO2


 


3/30/20 11:00  114 24 102/62 (75) 100 Ventilator  


 


3/30/20 08:00 99.8       





 99.8       














                                    I & O   


 


 3/29/20 3/29/20 3/30/20





 14:59 22:59 06:59


 


Intake Total 250 ml 954 ml 1065 ml


 


Output Total 185 ml 125 ml 195 ml


 


Balance 65 ml 829 ml 870 ml











Physical Exam


Physical Exam:


GENERAL: Orally intubated/sedated - generalizd anasarca 


HEENT: Mild icterus, pupils equal, ETT, NGT


NECK:  Supple. 


LUNGS:  Decreased breath sounds at the bases.


HEART:  S1, S2, regular 


ABDOMEN:  Distended, hypoactive BS, Rectal tube in place 


: [place]   


EXTREMITIES: Generalized edema, no cyanosis - some mottling, [place] boots 

bilaterally 


DERMATOLOGIC:  Warm and dry.  No generalized rash.  


CENTRAL NERVOUS SYSTEM: Sedated 


RIJ Temp HDC, RUE-PICC & LIJ - clean


General:  Other (sedated )


Heart:  Other (increased rate)


Lungs:  Crackles


Abdomen:  Other (firm abdomen)


Extremities:  No edema, Other (SOME CLUBBING )


Skin:  Other (mottling noted to extremities )





Labs


Labs:





Laboratory Tests








Test


 3/29/20


12:10 3/29/20


13:00 3/29/20


17:45 3/29/20


18:25


 


Glucose (Fingerstick)


 204 mg/dL


(70-99) 


 206 mg/dL


(70-99) 





 


Sodium Level


 


 138 mmol/L


(136-145) 


 138 mmol/L


(136-145)


 


Potassium Level


 


 4.4 mmol/L


(3.5-5.1) 


 4.3 mmol/L


(3.5-5.1)


 


Chloride Level


 


 103 mmol/L


() 


 105 mmol/L


()


 


Carbon Dioxide Level


 


 27 mmol/L


(21-32) 


 28 mmol/L


(21-32)


 


Anion Gap  8 (6-14)   5 (6-14) 


 


Blood Urea Nitrogen


 


 34 mg/dL


(7-20) 


 36 mg/dL


(7-20)


 


Creatinine


 


 1.2 mg/dL


(0.6-1.0) 


 1.3 mg/dL


(0.6-1.0)


 


Estimated GFR


(Cockcroft-Gault) 


 47.7 


 


 43.5 





 


Glucose Level


 


 210 mg/dL


(70-99) 


 211 mg/dL


(70-99)


 


Calcium Level


 


 8.2 mg/dL


(8.5-10.1) 


 7.8 mg/dL


(8.5-10.1)


 


Phosphorus Level


 


 2.6 mg/dL


(2.6-4.7) 


 2.8 mg/dL


(2.6-4.7)


 


Magnesium Level


 


 2.3 mg/dL


(1.8-2.4) 


 2.3 mg/dL


(1.8-2.4)


 


Test


 3/29/20


23:50 3/30/20


00:04 3/30/20


05:30 3/30/20


05:35


 


Sodium Level


 137 mmol/L


(136-145) 


 136 mmol/L


(136-145) 





 


Potassium Level


 4.4 mmol/L


(3.5-5.1) 


 4.3 mmol/L


(3.5-5.1) 





 


Chloride Level


 104 mmol/L


() 


 103 mmol/L


() 





 


Carbon Dioxide Level


 27 mmol/L


(21-32) 


 27 mmol/L


(21-32) 





 


Anion Gap 6 (6-14)   6 (6-14)  


 


Blood Urea Nitrogen


 37 mg/dL


(7-20) 


 35 mg/dL


(7-20) 





 


Creatinine


 1.3 mg/dL


(0.6-1.0) 


 1.3 mg/dL


(0.6-1.0) 





 


Estimated GFR


(Cockcroft-Gault) 43.5 


 


 43.5 


 





 


Glucose Level


 213 mg/dL


(70-99) 


 204 mg/dL


(70-99) 





 


Calcium Level


 7.9 mg/dL


(8.5-10.1) 


 8.1 mg/dL


(8.5-10.1) 





 


Phosphorus Level


 2.9 mg/dL


(2.6-4.7) 


 2.9 mg/dL


(2.6-4.7) 





 


Magnesium Level


 2.3 mg/dL


(1.8-2.4) 


 2.4 mg/dL


(1.8-2.4) 





 


Glucose (Fingerstick)


 


 177 mg/dL


(70-99) 


 187 mg/dL


(70-99)


 


White Blood Count


 


 


 45.0 x10^3/uL


(4.0-11.0) 





 


Red Blood Count


 


 


 2.20 x10^6/uL


(3.50-5.40) 





 


Hemoglobin


 


 


 7.3 g/dL


(12.0-15.5) 





 


Hematocrit


 


 


 22.0 %


(36.0-47.0) 





 


Mean Corpuscular Volume


 


 


 100 fL


() 





 


Mean Corpuscular Hemoglobin   33 pg (25-35)  


 


Mean Corpuscular Hemoglobin


Concent 


 


 33 g/dL


(31-37) 





 


Red Cell Distribution Width


 


 


 15.5 %


(11.5-14.5) 





 


Platelet Count


 


 


 383 x10^3/uL


(140-400) 





 


Neutrophils (%) (Auto)   85 % (31-73)  


 


Lymphocytes (%) (Auto)   9 % (24-48)  


 


Monocytes (%) (Auto)   4 % (0-9)  


 


Eosinophils (%) (Auto)   1 % (0-3)  


 


Basophils (%) (Auto)   0 % (0-3)  


 


Neutrophils # (Auto)


 


 


 38.4 x10^3/uL


(1.8-7.7) 





 


Lymphocytes # (Auto)


 


 


 4.0 x10^3/uL


(1.0-4.8) 





 


Monocytes # (Auto)


 


 


 2.0 x10^3/uL


(0.0-1.1) 





 


Eosinophils # (Auto)


 


 


 0.5 x10^3/uL


(0.0-0.7) 





 


Basophils # (Auto)


 


 


 0.2 x10^3/uL


(0.0-0.2) 





 


Segmented Neutrophils %   68 % (35-66)  


 


Band Neutrophils %   19 % (0-9)  


 


Lymphocytes %   6 % (24-48)  


 


Monocytes %   5 % (0-10)  


 


Eosinophils %   1 % (0-5)  


 


Myelocytes %   1 % (0-0)  


 


Nucleated Red Blood Cells   4  


 


Toxic Granulation   Present  


 


Platelet Estimate


 


 


 Adequate


(ADEQUATE) 





 


Large Platelets   Present  


 


Polychromasia   Present  


 


Anisocytosis   Slight  


 


Macrocytosis   Present  











Review of Systems


Review of Systems:


Unable to obtain





Assessment and Plan


Assessmemt and Plan


Problems


Medical Problems:


(1) Acute pancreatitis


Status: Acute  





(2) Cholelithiasis


Status: Acute  





Respiratory failure requiring intubation


Severe Acute pancreatitis


Acute kidney failure now requiring dialysis


Salpingo--itis


Gallstones (Calculus of gallbladder with acute cholecystitis without 

obstruction)


HTN 


Leukocytosis 


Hypoxia


Uterine fibroid


Hypoxia with respiratory failure


Intractable pain


Intractable nausea











Plan


CRRT but we are changing to hemodialysis


Mechanical ventilation


ICU monitoring


Going for CT of the chest abdomen and pelvis today


When necessary transfusions


Trend labs especially white count and lipase levels


We have consulted GI and general surgery


Appreciate pulmonary assistance as well


IV antibiotics


IV fluids


WA narcotics


When necessary anti-medics


Home meds if possible


DVT prophylaxis


Full code


She is critically ill


Total time 32 minutes





Comment


Review of Relevant


I have reviewed the following items josy (where applicable) has been applied.


Medications:





Current Medications








 Medications


  (Trade)  Dose


 Ordered  Sig/Yee


 Route


 PRN Reason  Start Time


 Stop Time Status Last Admin


Dose Admin


 


 Sodium Chloride


 90 meq/Potassium


 Chloride 15 meq/


 Potassium


 Phosphate 18 mmol/


 Magnesium Sulfate


 8 meq/Calcium


 Gluconate 15 meq/


 Multivitamins 10


 ml/Chromium/


 Copper/Manganese/


 Seleni/Zn 0.5 ml/


 Insulin Human


 Regular 15 unit/


 Total Parenteral


 Nutrition/Amino


 Acids/Dextrose/


 Fat Emulsion


 Intravenous  1,400 ml @ 


 58.333 mls/


 hr  TPN  CONT


 IV


   3/29/20 22:00


 3/30/20 21:59  3/29/20 22:05





 


 Potassium


 Chloride 15 meq/


 Bicarbonate


 Dialysis Soln w/


 out KCl  5,007.5 ml


  @ 1,000 mls/


 hr  Q5H1M


 IV


   3/29/20 20:00


    3/30/20 06:06





 


 Potassium


 Chloride 15 meq/


 Bicarbonate


 Dialysis Soln w/


 out KCl  5,007.5 ml


  @ 1,000 mls/


 hr  Q5H1M


 IV


   3/29/20 20:00


    3/30/20 06:07





 


 Potassium


 Chloride 15 meq/


 Bicarbonate


 Dialysis Soln w/


 out KCl  5,007.5 ml


  @ 1,000 mls/


 hr  Q5H1M


 IV


   3/29/20 20:00


    3/30/20 06:06














Hemodynamically unstable?:  Yes


Is patient in severe pain?:  Yes


Is NPO status required?:  Yes











HECTOR MASON III DO           Mar 30, 2020 11:22

## 2020-03-30 NOTE — RAD
CHEST AP ONLY

 

History: Ventilator

 

Comparison: March 29, 2020

 

Findings:

Single view of the chest is submitted. 

There is enteric catheter coursing into the stomach. Endotracheal tube tip

terminates laterally about 2 cm from nati, somewhat poorly visualized. 

There is right internal jugular venous catheter with the tip in superior 

aspect of the superior vena cava and right upper extremity PICC which is 

poorly visualized due to other overlying catheters. There is also left 

venous catheter with the tip likely near the cavoatrial junction. No 

pneumothorax is identified. There is again suspected at least small 

bilateral pleural effusions with adjacent airspace opacity of the mid to 

inferior hemithoraces bilaterally, some questionable slight improved 

aeration of the left lung base. Heart size is similar. There is again 

right infrahilar opacity which may be component of atelectasis.

 

Impression: 

 

1.  There are persistent bilateral pleural effusions with adjacent 

airspace opacity, some questionable slight improved aeration left lung 

base. There are support catheters and tubes as stated.

 

Electronically signed by: Anival rGeene MD (3/30/2020 7:31 AM) [facility]

## 2020-03-30 NOTE — PDOC
PROGRESS NOTES


Assessment


Assessment


Respiratory failure.


Seizure.


Metabolic encephalopathy.


Fever 102.7 degree.


Metabolic acidosis.


Diffuse pulmonary infiltrate.


Pleural effusion.


Pancreatitis


Gallstone.


Leukocytosis.


Electrolytes imbalances.


Hyperglycemia.


DM.


HTN.


HLD.


Anemia.


Obesity.





RECOMMENDATIONS/PLAN:


Continue life support in ICU at the present time.


Keppra if has further seizures.


Treat medical diseases.





EEG last week: No seizure activity.





Past Medical History


Cardiovascular:  HTN, Hyperlipidemia


Endocrine:  Diabetes





Family History


Unobtainable.





Social HistoryU


not obtainable





Allergies


Coded Allergies:  


     codeine (Verified  Allergy, Intermediate, rash, 3/16/20)





ROS


Unobtainable in unresponsive state.





PHYSICAL EXAMINATION:   


General appearance in sub acute distress.  


HEENT:  Normocephalic and nontraumatic.  Eyes, nose, ears, and throat are 

unremarkable.


Neck is supple. No lymphadenopathy. 


Cardiovascular:  S1, S2.  


Pulmonary:  On vent.. 


Abdomen:  Bowel sounds are weak.  


Extremities:  No rash, lesions.  





NEUROLOGICAL  EXAMINATION:


Unresponsive.


On vent.


Not oriented to time, place and person.


PERRL.


EOMI not elicited,


CN: no acute focal findings.


Muscle tone: decreased.


Muscle strength: No movements to stimuli.


DTR: 0-1


Plantar reflex: No response bilaterally 


Gait: not able to walk.


Sensory exam: no response to stimuli..


Not able to access cerebellar signs.


F-T-N test not performed due to unresponsiveness





Objective


Objective





Vital Signs








  Date Time  Temp Pulse Resp B/P (MAP) Pulse Ox O2 Delivery O2 Flow Rate FiO2


 


3/30/20 15:55     97   


 


3/30/20 14:46       6.0 


 


3/30/20 14:00  118 25 119/53 (75)  Ventilator  


 


3/30/20 12:00 100.2       





 100.2       














Intake and Output 


 


 3/30/20





 07:00


 


Intake Total 2269 ml


 


Output Total 490 ml


 


Balance 1779 ml


 


 


 


IV Total 2269 ml


 


Output Urine Total 390 ml


 


Gastric Drainage Total 100 ml











Vitals Signs


Vitals





                          VS - Last 72 Hours, by Label








  Date Time  Temp Pulse Resp B/P (MAP) Pulse Ox O2 Delivery O2 Flow Rate FiO2


 


3/30/20 15:55     97   


 


3/30/20 14:46     99  6.0 


 


3/30/20 14:00  118 25 119/53 (75) 99 Ventilator  


 


3/30/20 13:39     98   


 


3/30/20 13:00  126 25 112/52 (72) 98 Ventilator  


 


3/30/20 12:04     100   


 


3/30/20 12:00 100.2 122 25 115/69 (84) 99 Ventilator  





 100.2       


 


3/30/20 12:00      Mechanical Ventilator  


 


3/30/20 11:00  114 24 102/62 (75) 100 Ventilator  


 


3/30/20 10:00  117 24 105/51 (69) 99 Ventilator  


 


3/30/20 09:00  118 25 90/65 (73) 99 Ventilator  


 


3/30/20 08:00 99.8 121 25 98/78 (85) 99 Ventilator  





 99.8       


 


3/30/20 08:00      Mechanical Ventilator  


 


3/30/20 07:00  118 26 89/67 (74) 100 Ventilator  


 


3/30/20 06:13      Ventilator  


 


3/30/20 06:00  121 25 129/48 (75) 100 Ventilator  


 


3/30/20 05:40      BiPAP/CPAP  


 


3/30/20 05:00  121 25 96/57 (70) 100 Ventilator  


 


3/30/20 04:37     100   


 


3/30/20 04:00 98.9 119 25 95/60 (72) 100 Ventilator  





 98.9       


 


3/30/20 03:30      Mechanical Ventilator  


 


3/30/20 03:15  116  108/54 (72)    


 


3/30/20 03:00  119 25  100 Ventilator  


 


3/30/20 02:30  116  112/63 (79)    


 


3/30/20 02:15  116  99/55 (70)    


 


3/30/20 02:00  117 25 105/55 (72) 100 Ventilator  


 


3/30/20 01:58     100   


 


3/30/20 01:45  124  98/69 (79)    


 


3/30/20 01:30  120      


 


3/30/20 01:15  124  73/56 (62)    


 


3/30/20 01:00  119 25 121/91 (101) 100 Ventilator  


 


3/30/20 00:00 97.8 113 25 105/59 (74) 100 Ventilator  





 97.8       


 


3/29/20 23:45  113  114/57 (76)    


 


3/29/20 23:30      Mechanical Ventilator  


 


3/29/20 23:30  112  108/56 (73)    


 


3/29/20 23:00  112 25 91/66 (74) 100 Ventilator  


 


3/29/20 22:32     93   


 


3/29/20 22:00  114 25 109/85 (93) 95 Ventilator  


 


3/29/20 21:45  115  92/58 (69)    


 


3/29/20 21:30  115  104/68 (80)    


 


3/29/20 21:00  116 25 105/67 (80) 99 Ventilator  


 


3/29/20 20:27      Ventilator  


 


3/29/20 20:19     93   


 


3/29/20 20:01 97.8 113 25 129/63 (85) 97 Ventilator  





 97.8       


 


3/29/20 19:46      Ventilator  


 


3/29/20 19:45      Mechanical Ventilator  


 


3/29/20 19:00  112 25 102/52 (69) 96 Ventilator  


 


3/29/20 18:00  116 26 90/58 (69) 96 Ventilator  


 


3/29/20 17:00  114 25 105/56 (72) 98 Ventilator  


 


3/29/20 16:05     97   


 


3/29/20 16:00      Mechanical Ventilator  


 


3/29/20 16:00 98.5 113 26 106/68 (81) 98 Ventilator  





 98.5       


 


3/29/20 15:00  114 28 114/83 (93) 99 Ventilator  


 


3/29/20 14:00  124 24 102/65 (77) 95 Ventilator  


 


3/29/20 13:00  119 24 96/59 (71) 96 Ventilator  


 


3/29/20 12:00      Mechanical Ventilator  


 


3/29/20 12:00  122 26 128/68 (88) 98 Ventilator  


 


3/29/20 11:37     97 Ventilator  


 


3/29/20 11:00 100.0 127 20 141/82 (101) 96 Ventilator  





 100.0       


 


3/29/20 10:00  128 34 136/94 (108) 95 Ventilator  


 


3/29/20 09:00  122 25 114/79 (91) 97 Ventilator  


 


3/29/20 08:59     97 Ventilator  


 


3/29/20 08:42     96 Ventilator  


 


3/29/20 08:15      Mechanical Ventilator  


 


3/29/20 08:12     96 Ventilator  


 


3/29/20 08:00 99.7 120 25 114/67 (83) 95 Ventilator  





 99.7       


 


3/29/20 07:00  112 25 118/92 (101) 98 Ventilator  











Laboratory


Laboratory





Laboratory Tests








Test


 3/29/20


17:45 3/29/20


18:25 3/29/20


23:50 3/30/20


00:04


 


Glucose (Fingerstick)


 206 mg/dL


(70-99) 


 


 177 mg/dL


(70-99)


 


Sodium Level


 


 138 mmol/L


(136-145) 137 mmol/L


(136-145) 





 


Potassium Level


 


 4.3 mmol/L


(3.5-5.1) 4.4 mmol/L


(3.5-5.1) 





 


Chloride Level


 


 105 mmol/L


() 104 mmol/L


() 





 


Carbon Dioxide Level


 


 28 mmol/L


(21-32) 27 mmol/L


(21-32) 





 


Anion Gap  5 (6-14)  6 (6-14)  


 


Blood Urea Nitrogen


 


 36 mg/dL


(7-20) 37 mg/dL


(7-20) 





 


Creatinine


 


 1.3 mg/dL


(0.6-1.0) 1.3 mg/dL


(0.6-1.0) 





 


Estimated GFR


(Cockcroft-Gault) 


 43.5 


 43.5 


 





 


Glucose Level


 


 211 mg/dL


(70-99) 213 mg/dL


(70-99) 





 


Calcium Level


 


 7.8 mg/dL


(8.5-10.1) 7.9 mg/dL


(8.5-10.1) 





 


Phosphorus Level


 


 2.8 mg/dL


(2.6-4.7) 2.9 mg/dL


(2.6-4.7) 





 


Magnesium Level


 


 2.3 mg/dL


(1.8-2.4) 2.3 mg/dL


(1.8-2.4) 





 


Test


 3/30/20


05:30 3/30/20


05:35 3/30/20


14:18 3/30/20


14:30


 


White Blood Count


 45.0 x10^3/uL


(4.0-11.0) 


 


 





 


Red Blood Count


 2.20 x10^6/uL


(3.50-5.40) 


 


 





 


Hemoglobin


 7.3 g/dL


(12.0-15.5) 


 


 





 


Hematocrit


 22.0 %


(36.0-47.0) 


 


 





 


Mean Corpuscular Volume


 100 fL


() 


 


 





 


Mean Corpuscular Hemoglobin 33 pg (25-35)    


 


Mean Corpuscular Hemoglobin


Concent 33 g/dL


(31-37) 


 


 





 


Red Cell Distribution Width


 15.5 %


(11.5-14.5) 


 


 





 


Platelet Count


 383 x10^3/uL


(140-400) 


 


 





 


Neutrophils (%) (Auto) 85 % (31-73)    


 


Lymphocytes (%) (Auto) 9 % (24-48)    


 


Monocytes (%) (Auto) 4 % (0-9)    


 


Eosinophils (%) (Auto) 1 % (0-3)    


 


Basophils (%) (Auto) 0 % (0-3)    


 


Neutrophils # (Auto)


 38.4 x10^3/uL


(1.8-7.7) 


 


 





 


Lymphocytes # (Auto)


 4.0 x10^3/uL


(1.0-4.8) 


 


 





 


Monocytes # (Auto)


 2.0 x10^3/uL


(0.0-1.1) 


 


 





 


Eosinophils # (Auto)


 0.5 x10^3/uL


(0.0-0.7) 


 


 





 


Basophils # (Auto)


 0.2 x10^3/uL


(0.0-0.2) 


 


 





 


Segmented Neutrophils % 68 % (35-66)    


 


Band Neutrophils % 19 % (0-9)    


 


Lymphocytes % 6 % (24-48)    


 


Monocytes % 5 % (0-10)    


 


Eosinophils % 1 % (0-5)    


 


Myelocytes % 1 % (0-0)    


 


Nucleated Red Blood Cells 4    


 


Toxic Granulation Present    


 


Platelet Estimate


 Adequate


(ADEQUATE) 


 


 





 


Large Platelets Present    


 


Polychromasia Present    


 


Anisocytosis Slight    


 


Macrocytosis Present    


 


Sodium Level


 136 mmol/L


(136-145) 


 


 137 mmol/L


(136-145)


 


Potassium Level


 4.3 mmol/L


(3.5-5.1) 


 


 4.4 mmol/L


(3.5-5.1)


 


Chloride Level


 103 mmol/L


() 


 


 103 mmol/L


()


 


Carbon Dioxide Level


 27 mmol/L


(21-32) 


 


 26 mmol/L


(21-32)


 


Anion Gap 6 (6-14)    8 (6-14) 


 


Blood Urea Nitrogen


 35 mg/dL


(7-20) 


 


 41 mg/dL


(7-20)


 


Creatinine


 1.3 mg/dL


(0.6-1.0) 


 


 1.6 mg/dL


(0.6-1.0)


 


Estimated GFR


(Cockcroft-Gault) 43.5 


 


 


 34.3 





 


Glucose Level


 204 mg/dL


(70-99) 


 


 244 mg/dL


(70-99)


 


Calcium Level


 8.1 mg/dL


(8.5-10.1) 


 


 8.1 mg/dL


(8.5-10.1)


 


Phosphorus Level


 2.9 mg/dL


(2.6-4.7) 


 


 3.0 mg/dL


(2.6-4.7)


 


Magnesium Level


 2.4 mg/dL


(1.8-2.4) 


 


 2.2 mg/dL


(1.8-2.4)


 


Glucose (Fingerstick)


 


 187 mg/dL


(70-99) 237 mg/dL


(70-99) 











Microbiology


3/24/20 Blood Culture - Final, Complete


          NO GROWTH AFTER 5 DAYS





Medication


Medications





Current Medications


Info (CONTRAST GIVEN -- Rx MONITORING) 1 each PRN DAILY  PRN MC SEE COMMENTS;  

Start 3/30/20 at 11:45;  Stop 4/1/20 at 11:44


Iohexol (Omnipaque 240 Mg/ml) 30 ml 1X  ONCE PO ;  Start 3/30/20 at 11:30;  Stop

3/30/20 at 11:33;  Status DC


Potassium Chloride 15 meq/ Bicarbonate Dialysis Soln w/ out KCl 5,007.5 ml  @ 

1,000 mls/ hr Q5H1M IV  Last administered on 3/30/20at 14:48;  Start 3/29/20 at 

20:00


Potassium Chloride 15 meq/ Bicarbonate Dialysis Soln w/ out KCl 5,007.5 ml  @ 

1,000 mls/ hr Q5H1M IV  Last administered on 3/30/20at 14:49;  Start 3/29/20 at 

20:00


Potassium Chloride 15 meq/ Bicarbonate Dialysis Soln w/ out KCl 5,007.5 ml  @ 

1,000 mls/ hr Q5H1M IV  Last administered on 3/30/20at 14:49;  Start 3/29/20 at 

20:00


Sodium Chloride 90 meq/Potassium Chloride 15 meq/ Potassium Phosphate 18 mmol/ 

Magnesium Sulfate 8 meq/Calcium Gluconate 15 meq/ Multivitamins 10 ml/Chromium/ 

Copper/Manganese/ Seleni/Zn 0.5 ml/ Insulin Human Regular 15 unit/ Total 

Parenteral Nutrition/Amino Acids/Dextrose/ Fat Emulsion Intravenous 1,400 ml @  

58.333 mls/ hr TPN  CONT IV  Last administered on 3/29/20at 22:05;  Start 

3/29/20 at 22:00;  Stop 3/30/20 at 21:59


Sodium Chloride 90 meq/Potassium Chloride 15 meq/ Potassium Phosphate 18 mmol/ 

Magnesium Sulfate 8 meq/Calcium Gluconate 15 meq/ Multivitamins 10 ml/Chromium/ 

Copper/Manganese/ Seleni/Zn 0.5 ml/ Insulin Human Regular 15 unit/ Total 

Parenteral Nutrition/Amino Acids/Dextrose/ Fat Emulsion Intravenous 1,400 ml @  

58.333 mls/ hr TPN  CONT IV ;  Start 3/30/20 at 22:00;  Stop 3/31/20 at 21:59





Comment


Review of Relevant


I have reviewed the following items josy (where applicable) has been applied.











DORYS LANDA MD                 Mar 30, 2020 16:13

## 2020-03-30 NOTE — PDOC
PROGRESS NOTES


Assessment


Assessment


Respiratory failure.


Seizure.


Metabolic encephalopathy.


Fever 102.7 degree.


Metabolic acidosis.


Diffuse pulmonary infiltrate.


Pleural effusion.


Pancreatitis


Gallstone.


Leukocytosis.


Electrolytes imbalances.


Hyperglycemia.


DM.


HTN.


HLD.


Anemia.


Obesity.





RECOMMENDATIONS/PLAN:


Continue life support in ICU at the present time.


Keppra if has further seizures.


Lab: see orders, including pending Covid-19 results.


Treat medical diseases.





EEG last week: No seizure activity.





Past Medical History


Cardiovascular:  HTN, Hyperlipidemia


Endocrine:  Diabetes





Family History


Unobtainable.





Social HistoryU


not obtainable





Allergies


Coded Allergies:  


     codeine (Verified  Allergy, Intermediate, rash, 3/16/20)





ROS


Unobtainable in unresponsive state.





PHYSICAL EXAMINATION:   


General appearance in sub acute distress.  


HEENT:  Normocephalic and nontraumatic.  Eyes, nose, ears, and throat are 

unremarkable.


Neck is supple. No lymphadenopathy. 


Cardiovascular:  S1, S2.  


Pulmonary:  On vent.. 


Abdomen:  Bowel sounds are weak.  


Extremities:  No rash, lesions.  





NEUROLOGICAL  EXAMINATION:


Unresponsive.


On vent.


Not oriented to time, place and person.


PERRL.


EOMI not elicited,


CN: no acute focal findings.


Muscle tone: within normal.


Muscle strength: No movements to stimuli.


DTR: 0-1


Plantar reflex: No response bilaterally 


Gait: not able to walk.


Sensory exam: no response to stimuli..


Not able to access cerebellar signs.


F-T-N test not performed due to unresponsiveness





Objective


Objective





Vital Signs








  Date Time  Temp Pulse Resp B/P (MAP) Pulse Ox O2 Delivery O2 Flow Rate FiO2


 


3/30/20 15:55     97   


 


3/30/20 14:46       6.0 


 


3/30/20 14:00  118 25 119/53 (75)  Ventilator  


 


3/30/20 12:00 100.2       





 100.2       














Intake and Output 


 


 3/30/20





 07:00


 


Intake Total 2269 ml


 


Output Total 490 ml


 


Balance 1779 ml


 


 


 


IV Total 2269 ml


 


Output Urine Total 390 ml


 


Gastric Drainage Total 100 ml











Vitals Signs


Vitals





                          VS - Last 72 Hours, by Label








  Date Time  Temp Pulse Resp B/P (MAP) Pulse Ox O2 Delivery O2 Flow Rate FiO2


 


3/30/20 15:55     97   


 


3/30/20 14:46     99  6.0 


 


3/30/20 14:00  118 25 119/53 (75) 99 Ventilator  


 


3/30/20 13:39     98   


 


3/30/20 13:00  126 25 112/52 (72) 98 Ventilator  


 


3/30/20 12:04     100   


 


3/30/20 12:00 100.2 122 25 115/69 (84) 99 Ventilator  





 100.2       


 


3/30/20 12:00      Mechanical Ventilator  


 


3/30/20 11:00  114 24 102/62 (75) 100 Ventilator  


 


3/30/20 10:00  117 24 105/51 (69) 99 Ventilator  


 


3/30/20 09:00  118 25 90/65 (73) 99 Ventilator  


 


3/30/20 08:00 99.8 121 25 98/78 (85) 99 Ventilator  





 99.8       


 


3/30/20 08:00      Mechanical Ventilator  


 


3/30/20 07:00  118 26 89/67 (74) 100 Ventilator  


 


3/30/20 06:13      Ventilator  


 


3/30/20 06:00  121 25 129/48 (75) 100 Ventilator  


 


3/30/20 05:40      BiPAP/CPAP  


 


3/30/20 05:00  121 25 96/57 (70) 100 Ventilator  


 


3/30/20 04:37     100   


 


3/30/20 04:00 98.9 119 25 95/60 (72) 100 Ventilator  





 98.9       


 


3/30/20 03:30      Mechanical Ventilator  


 


3/30/20 03:15  116  108/54 (72)    


 


3/30/20 03:00  119 25  100 Ventilator  


 


3/30/20 02:30  116  112/63 (79)    


 


3/30/20 02:15  116  99/55 (70)    


 


3/30/20 02:00  117 25 105/55 (72) 100 Ventilator  


 


3/30/20 01:58     100   


 


3/30/20 01:45  124  98/69 (79)    


 


3/30/20 01:30  120      


 


3/30/20 01:15  124  73/56 (62)    


 


3/30/20 01:00  119 25 121/91 (101) 100 Ventilator  


 


3/30/20 00:00 97.8 113 25 105/59 (74) 100 Ventilator  





 97.8       


 


3/29/20 23:45  113  114/57 (76)    


 


3/29/20 23:30      Mechanical Ventilator  


 


3/29/20 23:30  112  108/56 (73)    


 


3/29/20 23:00  112 25 91/66 (74) 100 Ventilator  


 


3/29/20 22:32     93   


 


3/29/20 22:00  114 25 109/85 (93) 95 Ventilator  


 


3/29/20 21:45  115  92/58 (69)    


 


3/29/20 21:30  115  104/68 (80)    


 


3/29/20 21:00  116 25 105/67 (80) 99 Ventilator  


 


3/29/20 20:27      Ventilator  


 


3/29/20 20:19     93   


 


3/29/20 20:01 97.8 113 25 129/63 (85) 97 Ventilator  





 97.8       


 


3/29/20 19:46      Ventilator  


 


3/29/20 19:45      Mechanical Ventilator  


 


3/29/20 19:00  112 25 102/52 (69) 96 Ventilator  


 


3/29/20 18:00  116 26 90/58 (69) 96 Ventilator  


 


3/29/20 17:00  114 25 105/56 (72) 98 Ventilator  


 


3/29/20 16:05     97   


 


3/29/20 16:00      Mechanical Ventilator  


 


3/29/20 16:00 98.5 113 26 106/68 (81) 98 Ventilator  





 98.5       


 


3/29/20 15:00  114 28 114/83 (93) 99 Ventilator  


 


3/29/20 14:00  124 24 102/65 (77) 95 Ventilator  


 


3/29/20 13:00  119 24 96/59 (71) 96 Ventilator  


 


3/29/20 12:00      Mechanical Ventilator  


 


3/29/20 12:00  122 26 128/68 (88) 98 Ventilator  


 


3/29/20 11:37     97 Ventilator  


 


3/29/20 11:00 100.0 127 20 141/82 (101) 96 Ventilator  





 100.0       


 


3/29/20 10:00  128 34 136/94 (108) 95 Ventilator  


 


3/29/20 09:00  122 25 114/79 (91) 97 Ventilator  


 


3/29/20 08:59     97 Ventilator  


 


3/29/20 08:42     96 Ventilator  


 


3/29/20 08:15      Mechanical Ventilator  


 


3/29/20 08:12     96 Ventilator  


 


3/29/20 08:00 99.7 120 25 114/67 (83) 95 Ventilator  





 99.7       


 


3/29/20 07:00  112 25 118/92 (101) 98 Ventilator  











Laboratory


Laboratory





Laboratory Tests








Test


 3/29/20


17:45 3/29/20


18:25 3/29/20


23:50 3/30/20


00:04


 


Glucose (Fingerstick)


 206 mg/dL


(70-99) 


 


 177 mg/dL


(70-99)


 


Sodium Level


 


 138 mmol/L


(136-145) 137 mmol/L


(136-145) 





 


Potassium Level


 


 4.3 mmol/L


(3.5-5.1) 4.4 mmol/L


(3.5-5.1) 





 


Chloride Level


 


 105 mmol/L


() 104 mmol/L


() 





 


Carbon Dioxide Level


 


 28 mmol/L


(21-32) 27 mmol/L


(21-32) 





 


Anion Gap  5 (6-14)  6 (6-14)  


 


Blood Urea Nitrogen


 


 36 mg/dL


(7-20) 37 mg/dL


(7-20) 





 


Creatinine


 


 1.3 mg/dL


(0.6-1.0) 1.3 mg/dL


(0.6-1.0) 





 


Estimated GFR


(Cockcroft-Gault) 


 43.5 


 43.5 


 





 


Glucose Level


 


 211 mg/dL


(70-99) 213 mg/dL


(70-99) 





 


Calcium Level


 


 7.8 mg/dL


(8.5-10.1) 7.9 mg/dL


(8.5-10.1) 





 


Phosphorus Level


 


 2.8 mg/dL


(2.6-4.7) 2.9 mg/dL


(2.6-4.7) 





 


Magnesium Level


 


 2.3 mg/dL


(1.8-2.4) 2.3 mg/dL


(1.8-2.4) 





 


Test


 3/30/20


05:30 3/30/20


05:35 3/30/20


14:18 3/30/20


14:30


 


White Blood Count


 45.0 x10^3/uL


(4.0-11.0) 


 


 





 


Red Blood Count


 2.20 x10^6/uL


(3.50-5.40) 


 


 





 


Hemoglobin


 7.3 g/dL


(12.0-15.5) 


 


 





 


Hematocrit


 22.0 %


(36.0-47.0) 


 


 





 


Mean Corpuscular Volume


 100 fL


() 


 


 





 


Mean Corpuscular Hemoglobin 33 pg (25-35)    


 


Mean Corpuscular Hemoglobin


Concent 33 g/dL


(31-37) 


 


 





 


Red Cell Distribution Width


 15.5 %


(11.5-14.5) 


 


 





 


Platelet Count


 383 x10^3/uL


(140-400) 


 


 





 


Neutrophils (%) (Auto) 85 % (31-73)    


 


Lymphocytes (%) (Auto) 9 % (24-48)    


 


Monocytes (%) (Auto) 4 % (0-9)    


 


Eosinophils (%) (Auto) 1 % (0-3)    


 


Basophils (%) (Auto) 0 % (0-3)    


 


Neutrophils # (Auto)


 38.4 x10^3/uL


(1.8-7.7) 


 


 





 


Lymphocytes # (Auto)


 4.0 x10^3/uL


(1.0-4.8) 


 


 





 


Monocytes # (Auto)


 2.0 x10^3/uL


(0.0-1.1) 


 


 





 


Eosinophils # (Auto)


 0.5 x10^3/uL


(0.0-0.7) 


 


 





 


Basophils # (Auto)


 0.2 x10^3/uL


(0.0-0.2) 


 


 





 


Segmented Neutrophils % 68 % (35-66)    


 


Band Neutrophils % 19 % (0-9)    


 


Lymphocytes % 6 % (24-48)    


 


Monocytes % 5 % (0-10)    


 


Eosinophils % 1 % (0-5)    


 


Myelocytes % 1 % (0-0)    


 


Nucleated Red Blood Cells 4    


 


Toxic Granulation Present    


 


Platelet Estimate


 Adequate


(ADEQUATE) 


 


 





 


Large Platelets Present    


 


Polychromasia Present    


 


Anisocytosis Slight    


 


Macrocytosis Present    


 


Sodium Level


 136 mmol/L


(136-145) 


 


 137 mmol/L


(136-145)


 


Potassium Level


 4.3 mmol/L


(3.5-5.1) 


 


 4.4 mmol/L


(3.5-5.1)


 


Chloride Level


 103 mmol/L


() 


 


 103 mmol/L


()


 


Carbon Dioxide Level


 27 mmol/L


(21-32) 


 


 26 mmol/L


(21-32)


 


Anion Gap 6 (6-14)    8 (6-14) 


 


Blood Urea Nitrogen


 35 mg/dL


(7-20) 


 


 41 mg/dL


(7-20)


 


Creatinine


 1.3 mg/dL


(0.6-1.0) 


 


 1.6 mg/dL


(0.6-1.0)


 


Estimated GFR


(Cockcroft-Gault) 43.5 


 


 


 34.3 





 


Glucose Level


 204 mg/dL


(70-99) 


 


 244 mg/dL


(70-99)


 


Calcium Level


 8.1 mg/dL


(8.5-10.1) 


 


 8.1 mg/dL


(8.5-10.1)


 


Phosphorus Level


 2.9 mg/dL


(2.6-4.7) 


 


 3.0 mg/dL


(2.6-4.7)


 


Magnesium Level


 2.4 mg/dL


(1.8-2.4) 


 


 2.2 mg/dL


(1.8-2.4)


 


Glucose (Fingerstick)


 


 187 mg/dL


(70-99) 237 mg/dL


(70-99) 











Microbiology


3/24/20 Blood Culture - Final, Complete


          NO GROWTH AFTER 5 DAYS





Medication


Medications





Current Medications


Info (CONTRAST GIVEN -- Rx MONITORING) 1 each PRN DAILY  PRN MC SEE COMMENTS;  

Start 3/30/20 at 11:45;  Stop 4/1/20 at 11:44


Iohexol (Omnipaque 240 Mg/ml) 30 ml 1X  ONCE PO ;  Start 3/30/20 at 11:30;  Stop

3/30/20 at 11:33;  Status DC


Potassium Chloride 15 meq/ Bicarbonate Dialysis Soln w/ out KCl 5,007.5 ml  @ 1,

000 mls/ hr Q5H1M IV  Last administered on 3/30/20at 14:48;  Start 3/29/20 at 

20:00


Potassium Chloride 15 meq/ Bicarbonate Dialysis Soln w/ out KCl 5,007.5 ml  @ 

1,000 mls/ hr Q5H1M IV  Last administered on 3/30/20at 14:49;  Start 3/29/20 at 

20:00


Potassium Chloride 15 meq/ Bicarbonate Dialysis Soln w/ out KCl 5,007.5 ml  @ 

1,000 mls/ hr Q5H1M IV  Last administered on 3/30/20at 14:49;  Start 3/29/20 at 

20:00


Sodium Chloride 90 meq/Potassium Chloride 15 meq/ Potassium Phosphate 18 mmol/ 

Magnesium Sulfate 8 meq/Calcium Gluconate 15 meq/ Multivitamins 10 ml/Chromium/ 

Copper/Manganese/ Seleni/Zn 0.5 ml/ Insulin Human Regular 15 unit/ Total 

Parenteral Nutrition/Amino Acids/Dextrose/ Fat Emulsion Intravenous 1,400 ml @  

58.333 mls/ hr TPN  CONT IV  Last administered on 3/29/20at 22:05;  Start 

3/29/20 at 22:00;  Stop 3/30/20 at 21:59


Sodium Chloride 90 meq/Potassium Chloride 15 meq/ Potassium Phosphate 18 mmol/ 

Magnesium Sulfate 8 meq/Calcium Gluconate 15 meq/ Multivitamins 10 ml/Chromium/ 

Copper/Manganese/ Seleni/Zn 0.5 ml/ Insulin Human Regular 15 unit/ Total 

Parenteral Nutrition/Amino Acids/Dextrose/ Fat Emulsion Intravenous 1,400 ml @  

58.333 mls/ hr TPN  CONT IV ;  Start 3/30/20 at 22:00;  Stop 3/31/20 at 21:59





Comment


Review of Relevant


I have reviewed the following items josy (where applicable) has been applied.











DORYS LANDA MD                 Mar 30, 2020 16:50

## 2020-03-31 VITALS — SYSTOLIC BLOOD PRESSURE: 92 MMHG | DIASTOLIC BLOOD PRESSURE: 55 MMHG

## 2020-03-31 VITALS — SYSTOLIC BLOOD PRESSURE: 115 MMHG | DIASTOLIC BLOOD PRESSURE: 65 MMHG

## 2020-03-31 VITALS — DIASTOLIC BLOOD PRESSURE: 93 MMHG | SYSTOLIC BLOOD PRESSURE: 124 MMHG

## 2020-03-31 VITALS — SYSTOLIC BLOOD PRESSURE: 114 MMHG | DIASTOLIC BLOOD PRESSURE: 71 MMHG

## 2020-03-31 VITALS — SYSTOLIC BLOOD PRESSURE: 107 MMHG | DIASTOLIC BLOOD PRESSURE: 78 MMHG

## 2020-03-31 VITALS — DIASTOLIC BLOOD PRESSURE: 79 MMHG | SYSTOLIC BLOOD PRESSURE: 121 MMHG

## 2020-03-31 VITALS — SYSTOLIC BLOOD PRESSURE: 122 MMHG | DIASTOLIC BLOOD PRESSURE: 75 MMHG

## 2020-03-31 VITALS — SYSTOLIC BLOOD PRESSURE: 97 MMHG | DIASTOLIC BLOOD PRESSURE: 56 MMHG

## 2020-03-31 VITALS — DIASTOLIC BLOOD PRESSURE: 62 MMHG | SYSTOLIC BLOOD PRESSURE: 96 MMHG

## 2020-03-31 VITALS — SYSTOLIC BLOOD PRESSURE: 102 MMHG | DIASTOLIC BLOOD PRESSURE: 81 MMHG

## 2020-03-31 VITALS — DIASTOLIC BLOOD PRESSURE: 56 MMHG | SYSTOLIC BLOOD PRESSURE: 111 MMHG

## 2020-03-31 VITALS — SYSTOLIC BLOOD PRESSURE: 114 MMHG | DIASTOLIC BLOOD PRESSURE: 46 MMHG

## 2020-03-31 VITALS — SYSTOLIC BLOOD PRESSURE: 112 MMHG | DIASTOLIC BLOOD PRESSURE: 61 MMHG

## 2020-03-31 VITALS — DIASTOLIC BLOOD PRESSURE: 60 MMHG | SYSTOLIC BLOOD PRESSURE: 106 MMHG

## 2020-03-31 VITALS — DIASTOLIC BLOOD PRESSURE: 66 MMHG | SYSTOLIC BLOOD PRESSURE: 114 MMHG

## 2020-03-31 VITALS — SYSTOLIC BLOOD PRESSURE: 97 MMHG | DIASTOLIC BLOOD PRESSURE: 61 MMHG

## 2020-03-31 VITALS — SYSTOLIC BLOOD PRESSURE: 102 MMHG | DIASTOLIC BLOOD PRESSURE: 50 MMHG

## 2020-03-31 VITALS — DIASTOLIC BLOOD PRESSURE: 61 MMHG | SYSTOLIC BLOOD PRESSURE: 114 MMHG

## 2020-03-31 VITALS — DIASTOLIC BLOOD PRESSURE: 61 MMHG | SYSTOLIC BLOOD PRESSURE: 118 MMHG

## 2020-03-31 VITALS — DIASTOLIC BLOOD PRESSURE: 57 MMHG | SYSTOLIC BLOOD PRESSURE: 100 MMHG

## 2020-03-31 VITALS — DIASTOLIC BLOOD PRESSURE: 85 MMHG | SYSTOLIC BLOOD PRESSURE: 133 MMHG

## 2020-03-31 VITALS — DIASTOLIC BLOOD PRESSURE: 84 MMHG | SYSTOLIC BLOOD PRESSURE: 96 MMHG

## 2020-03-31 VITALS — SYSTOLIC BLOOD PRESSURE: 127 MMHG | DIASTOLIC BLOOD PRESSURE: 65 MMHG

## 2020-03-31 VITALS — DIASTOLIC BLOOD PRESSURE: 61 MMHG | SYSTOLIC BLOOD PRESSURE: 103 MMHG

## 2020-03-31 VITALS — DIASTOLIC BLOOD PRESSURE: 53 MMHG | SYSTOLIC BLOOD PRESSURE: 129 MMHG

## 2020-03-31 VITALS — DIASTOLIC BLOOD PRESSURE: 81 MMHG | SYSTOLIC BLOOD PRESSURE: 109 MMHG

## 2020-03-31 VITALS — DIASTOLIC BLOOD PRESSURE: 71 MMHG | SYSTOLIC BLOOD PRESSURE: 106 MMHG

## 2020-03-31 VITALS — SYSTOLIC BLOOD PRESSURE: 110 MMHG | DIASTOLIC BLOOD PRESSURE: 60 MMHG

## 2020-03-31 LAB
ALBUMIN SERPL-MCNC: 2.5 G/DL (ref 3.4–5)
ALBUMIN/GLOB SERPL: 0.9 {RATIO} (ref 1–1.7)
ALP SERPL-CCNC: 128 U/L (ref 46–116)
ALT SERPL-CCNC: 21 U/L (ref 14–59)
ANION GAP SERPL CALC-SCNC: 5 MMOL/L (ref 6–14)
ANION GAP SERPL CALC-SCNC: 6 MMOL/L (ref 6–14)
ANION GAP SERPL CALC-SCNC: 6 MMOL/L (ref 6–14)
ANION GAP SERPL CALC-SCNC: 8 MMOL/L (ref 6–14)
AST SERPL-CCNC: 50 U/L (ref 15–37)
BASE EXCESS ABG: -1 MMOL/L (ref -3–3)
BASOPHILS # BLD AUTO: 0.2 X10^3/UL (ref 0–0.2)
BASOPHILS # BLD AUTO: 0.2 X10^3/UL (ref 0–0.2)
BASOPHILS NFR BLD: 1 % (ref 0–3)
BASOPHILS NFR BLD: 1 % (ref 0–3)
BILIRUB SERPL-MCNC: 1 MG/DL (ref 0.2–1)
BUN SERPL-MCNC: 33 MG/DL (ref 7–20)
BUN SERPL-MCNC: 33 MG/DL (ref 7–20)
BUN SERPL-MCNC: 34 MG/DL (ref 7–20)
BUN SERPL-MCNC: 35 MG/DL (ref 7–20)
BUN/CREAT SERPL: 28 (ref 6–20)
CALCIUM SERPL-MCNC: 7.9 MG/DL (ref 8.5–10.1)
CALCIUM SERPL-MCNC: 8 MG/DL (ref 8.5–10.1)
CALCIUM SERPL-MCNC: 8.1 MG/DL (ref 8.5–10.1)
CALCIUM SERPL-MCNC: 8.1 MG/DL (ref 8.5–10.1)
CHLORIDE SERPL-SCNC: 103 MMOL/L (ref 98–107)
CHLORIDE SERPL-SCNC: 104 MMOL/L (ref 98–107)
CHLORIDE SERPL-SCNC: 104 MMOL/L (ref 98–107)
CHLORIDE SERPL-SCNC: 105 MMOL/L (ref 98–107)
CO2 SERPL-SCNC: 27 MMOL/L (ref 21–32)
CO2 SERPL-SCNC: 27 MMOL/L (ref 21–32)
CO2 SERPL-SCNC: 28 MMOL/L (ref 21–32)
CO2 SERPL-SCNC: 28 MMOL/L (ref 21–32)
CREAT SERPL-MCNC: 1.2 MG/DL (ref 0.6–1)
CREAT SERPL-MCNC: 1.2 MG/DL (ref 0.6–1)
CREAT SERPL-MCNC: 1.3 MG/DL (ref 0.6–1)
CREAT SERPL-MCNC: 1.3 MG/DL (ref 0.6–1)
EOSINOPHIL NFR BLD: 0.4 X10^3/UL (ref 0–0.7)
EOSINOPHIL NFR BLD: 0.8 X10^3/UL (ref 0–0.7)
EOSINOPHIL NFR BLD: 1 % (ref 0–3)
EOSINOPHIL NFR BLD: 2 % (ref 0–3)
ERYTHROCYTE [DISTWIDTH] IN BLOOD BY AUTOMATED COUNT: 15.4 % (ref 11.5–14.5)
ERYTHROCYTE [DISTWIDTH] IN BLOOD BY AUTOMATED COUNT: 17.1 % (ref 11.5–14.5)
GFR SERPLBLD BASED ON 1.73 SQ M-ARVRAT: 43.5 ML/MIN
GFR SERPLBLD BASED ON 1.73 SQ M-ARVRAT: 43.5 ML/MIN
GFR SERPLBLD BASED ON 1.73 SQ M-ARVRAT: 47.7 ML/MIN
GFR SERPLBLD BASED ON 1.73 SQ M-ARVRAT: 47.7 ML/MIN
GLOBULIN SER-MCNC: 2.8 G/DL (ref 2.2–3.8)
GLUCOSE SERPL-MCNC: 185 MG/DL (ref 70–99)
GLUCOSE SERPL-MCNC: 185 MG/DL (ref 70–99)
GLUCOSE SERPL-MCNC: 192 MG/DL (ref 70–99)
GLUCOSE SERPL-MCNC: 193 MG/DL (ref 70–99)
HCO3 BLDA-SCNC: 25 MMOL/L (ref 21–28)
HCT VFR BLD CALC: 19.4 % (ref 36–47)
HCT VFR BLD CALC: 28 % (ref 36–47)
HGB BLD-MCNC: 6.5 G/DL (ref 12–15.5)
HGB BLD-MCNC: 9.3 G/DL (ref 12–15.5)
INSPIRATION SETTING TIME VENT: 40
LYMPHOCYTES # BLD: 2.4 X10^3/UL (ref 1–4.8)
LYMPHOCYTES # BLD: 4.1 X10^3/UL (ref 1–4.8)
LYMPHOCYTES NFR BLD AUTO: 12 % (ref 24–48)
LYMPHOCYTES NFR BLD AUTO: 6 % (ref 24–48)
MAGNESIUM SERPL-MCNC: 2.1 MG/DL (ref 1.8–2.4)
MAGNESIUM SERPL-MCNC: 2.2 MG/DL (ref 1.8–2.4)
MAGNESIUM SERPL-MCNC: 2.3 MG/DL (ref 1.8–2.4)
MAGNESIUM SERPL-MCNC: 2.4 MG/DL (ref 1.8–2.4)
MCH RBC QN AUTO: 32 PG (ref 25–35)
MCH RBC QN AUTO: 34 PG (ref 25–35)
MCHC RBC AUTO-ENTMCNC: 33 G/DL (ref 31–37)
MCHC RBC AUTO-ENTMCNC: 33 G/DL (ref 31–37)
MCV RBC AUTO: 101 FL (ref 79–100)
MCV RBC AUTO: 97 FL (ref 79–100)
MONO #: 1.7 X10^3/UL (ref 0–1.1)
MONO #: 1.9 X10^3/UL (ref 0–1.1)
MONOCYTES NFR BLD: 5 % (ref 0–9)
MONOCYTES NFR BLD: 5 % (ref 0–9)
NEUT #: 29.1 X10^3/UL (ref 1.8–7.7)
NEUT #: 32.1 X10^3/UL (ref 1.8–7.7)
NEUTROPHILS NFR BLD AUTO: 82 % (ref 31–73)
NEUTROPHILS NFR BLD AUTO: 86 % (ref 31–73)
PCO2 BLDA: 47 MMHG (ref 35–46)
PHOSPHATE SERPL-MCNC: 2.7 MG/DL (ref 2.6–4.7)
PHOSPHATE SERPL-MCNC: 2.9 MG/DL (ref 2.6–4.7)
PHOSPHATE SERPL-MCNC: 2.9 MG/DL (ref 2.6–4.7)
PHOSPHATE SERPL-MCNC: 3.1 MG/DL (ref 2.6–4.7)
PLATELET # BLD AUTO: 307 X10^3/UL (ref 140–400)
PLATELET # BLD AUTO: 354 X10^3/UL (ref 140–400)
PO2 BLDA: 73 MMHG (ref 75–108)
POTASSIUM SERPL-SCNC: 3.9 MMOL/L (ref 3.5–5.1)
POTASSIUM SERPL-SCNC: 4.1 MMOL/L (ref 3.5–5.1)
PROT SERPL-MCNC: 5.3 G/DL (ref 6.4–8.2)
PROTHROMBIN TIME: 21.3 SEC (ref 11.7–14)
RBC # BLD AUTO: 1.92 X10^6/UL (ref 3.5–5.4)
RBC # BLD AUTO: 2.89 X10^6/UL (ref 3.5–5.4)
SAO2 % BLDA: 93 % (ref 92–99)
SODIUM SERPL-SCNC: 137 MMOL/L (ref 136–145)
SODIUM SERPL-SCNC: 138 MMOL/L (ref 136–145)
WBC # BLD AUTO: 35.5 X10^3/UL (ref 4–11)
WBC # BLD AUTO: 37.4 X10^3/UL (ref 4–11)

## 2020-03-31 RX ADMIN — AMIODARONE HYDROCHLORIDE PRN MLS/HR: 50 INJECTION, SOLUTION INTRAVENOUS at 11:34

## 2020-03-31 RX ADMIN — DAPTOMYCIN SCH MLS/HR: 500 INJECTION, POWDER, LYOPHILIZED, FOR SOLUTION INTRAVENOUS at 09:14

## 2020-03-31 RX ADMIN — POTASSIUM CHLORIDE SCH MLS/HR: 2 INJECTION, SOLUTION, CONCENTRATE INTRAVENOUS at 00:36

## 2020-03-31 RX ADMIN — PANTOPRAZOLE SODIUM SCH MG: 40 INJECTION, POWDER, FOR SOLUTION INTRAVENOUS at 09:16

## 2020-03-31 RX ADMIN — Medication PRN EACH: at 13:20

## 2020-03-31 RX ADMIN — MIDAZOLAM HYDROCHLORIDE PRN MLS/HR: 5 INJECTION, SOLUTION INTRAMUSCULAR; INTRAVENOUS at 21:33

## 2020-03-31 RX ADMIN — POTASSIUM CHLORIDE SCH MLS/HR: 2 INJECTION, SOLUTION, CONCENTRATE INTRAVENOUS at 17:15

## 2020-03-31 RX ADMIN — POTASSIUM CHLORIDE SCH MLS/HR: 2 INJECTION, SOLUTION, CONCENTRATE INTRAVENOUS at 12:27

## 2020-03-31 RX ADMIN — POTASSIUM CHLORIDE SCH MLS/HR: 2 INJECTION, SOLUTION, CONCENTRATE INTRAVENOUS at 00:37

## 2020-03-31 RX ADMIN — INSULIN LISPRO SCH UNITS: 100 INJECTION, SOLUTION INTRAVENOUS; SUBCUTANEOUS at 12:48

## 2020-03-31 RX ADMIN — ALBUMIN (HUMAN) PRN MLS/HR: 12.5 INJECTION, SOLUTION INTRAVENOUS at 08:27

## 2020-03-31 RX ADMIN — POTASSIUM CHLORIDE SCH MLS/HR: 2 INJECTION, SOLUTION, CONCENTRATE INTRAVENOUS at 05:46

## 2020-03-31 RX ADMIN — POTASSIUM CHLORIDE SCH MLS/HR: 2 INJECTION, SOLUTION, CONCENTRATE INTRAVENOUS at 12:26

## 2020-03-31 RX ADMIN — POTASSIUM CHLORIDE SCH MLS/HR: 2 INJECTION, SOLUTION, CONCENTRATE INTRAVENOUS at 05:47

## 2020-03-31 RX ADMIN — INSULIN LISPRO SCH UNITS: 100 INJECTION, SOLUTION INTRAVENOUS; SUBCUTANEOUS at 17:12

## 2020-03-31 RX ADMIN — CEFEPIME SCH GM: 2 INJECTION, POWDER, FOR SOLUTION INTRAVENOUS at 09:15

## 2020-03-31 RX ADMIN — INSULIN LISPRO SCH UNITS: 100 INJECTION, SOLUTION INTRAVENOUS; SUBCUTANEOUS at 05:54

## 2020-03-31 RX ADMIN — DEXTROSE SCH MLS/HR: 50 INJECTION, SOLUTION INTRAVENOUS at 09:16

## 2020-03-31 RX ADMIN — POTASSIUM CHLORIDE SCH MLS/HR: 2 INJECTION, SOLUTION, CONCENTRATE INTRAVENOUS at 17:14

## 2020-03-31 RX ADMIN — POTASSIUM CHLORIDE SCH MLS/HR: 2 INJECTION, SOLUTION, CONCENTRATE INTRAVENOUS at 22:06

## 2020-03-31 RX ADMIN — POTASSIUM CHLORIDE SCH MLS/HR: 2 INJECTION, SOLUTION, CONCENTRATE INTRAVENOUS at 12:08

## 2020-03-31 RX ADMIN — CEFEPIME SCH GM: 2 INJECTION, POWDER, FOR SOLUTION INTRAVENOUS at 21:12

## 2020-03-31 NOTE — PDOC
PROGRESS NOTES


Assessment


Assessment


Respiratory failure.


Seizure.


Metabolic encephalopathy.


Fever 102.7 degree.


Metabolic acidosis.


Diffuse pulmonary infiltrate.


Pleural effusion.


Pancreatitis


Gallstone.


Leukocytosis.


Electrolytes imbalances.


Hyperglycemia.


DM.


HTN.


HLD.


Anemia.


Obesity.





RECOMMENDATIONS/PLAN:


Continue life support in ICU at the present time.


Keppra if has further seizures.


Treat medical diseases.





EEG last week: No seizure activity.





OBJECTIVE:


3/31/20: No seizures reported over night.





Past Medical History


Cardiovascular:  HTN, Hyperlipidemia


Endocrine:  Diabetes





Family History


Unobtainable.





Social HistoryU


not obtainable





Allergies


Coded Allergies:  


Codeine (Verified  Allergy, Intermediate, rash, 3/16/20)





ROS


Unobtainable in unresponsive state.





PHYSICAL EXAMINATION:   


General appearance in sub acute distress.  


HEENT:  Normocephalic and nontraumatic.  Eyes, nose, ears, and throat are 

unremarkable.


Neck is supple. No lymphadenopathy. 


Cardiovascular:  S1, S2.  


Pulmonary:  On vent.. 


Abdomen:  Bowel sounds are weak.  


Extremities:  No rash, lesions.  





NEUROLOGICAL  EXAMINATION:


Minimal responsiveness.


On vent.


Not oriented to time, place and person.


PERRL.


EOMI not elicited,


CN: no acute focal findings.


Muscle tone: within normal.


Muscle strength: No movements to stimuli.


DTR: 1


Plantar reflex: No response bilaterally 


Gait: not able to walk.


Sensory exam: no response to stimuli..


Not able to access cerebellar signs.


F-T-N test not performed due to unresponsiveness





Objective


Objective





Vital Signs








  Date Time  Temp Pulse Resp B/P (MAP) Pulse Ox O2 Delivery O2 Flow Rate FiO2


 


3/31/20 13:00  110 25 109/81 (90) 97 Ventilator  


 


3/31/20 12:00 99.8       





 99.8       


 


3/31/20 11:40       6.0 














Intake and Output 


 


 3/31/20





 06:59


 


Intake Total 2651 ml


 


Output Total 540 ml


 


Balance 2111 ml


 


 


 


IV Total 2651 ml


 


Output Urine Total 340 ml


 


Gastric Drainage Total 200 ml











Vitals Signs


Vitals





                          VS - Last 72 Hours, by Label








  Date Time  Temp Pulse Resp B/P (MAP) Pulse Ox O2 Delivery O2 Flow Rate FiO2


 


3/31/20 13:00  110 25 109/81 (90) 97 Ventilator  


 


3/31/20 12:12     100 Ventilator  


 


3/31/20 12:00      Mechanical Ventilator  


 


3/31/20 12:00 99.8 108 25 112/61 (78) 100 Ventilator  





 99.8       


 


3/31/20 11:40     100  6.0 


 


3/31/20 11:00  113 28 114/66 (82) 100 Ventilator  


 


3/31/20 10:00  114 26 107/78 (88) 98 Ventilator  


 


3/31/20 09:55 99.0 112 26 122/75    





 99.0       


 


3/31/20 09:18 99.3 113 25 127/65    





 99.3       


 


3/31/20 09:00  116 25 97/56 (70) 99 Ventilator  


 


3/31/20 08:58 99.9 115 26 110/60    





 99.9       


 


3/31/20 08:27     98 Ventilator  


 


3/31/20 08:02 99.5 118 25 92/55    





 99.5       


 


3/31/20 08:00      Mechanical Ventilator  


 


3/31/20 08:00 99.0 114 26 118/61 (80) 99 Ventilator  





 99.0       


 


3/31/20 07:47 99.0 116 25 114/46    





 99.0       


 


3/31/20 07:00  114 25 96/62 (73) 99 Ventilator  


 


3/31/20 06:00  115 25 124/93 (103) 100 Ventilator  


 


3/31/20 05:55     98 Ventilator  


 


3/31/20 05:00  111 25 97/61 (73) 100 Ventilator  


 


3/31/20 04:00 99.1 111 25 102/81 (88) 100 Ventilator  





 99.1       


 


3/31/20 03:50      Mechanical Ventilator  


 


3/31/20 03:30     97 Ventilator  


 


3/31/20 03:00  111 25 96/84 (88) 100 Ventilator  


 


3/31/20 02:03     100 Ventilator  


 


3/31/20 02:00  112 25 102/50 (67) 100 Ventilator  


 


3/31/20 01:33     99 Ventilator  


 


3/31/20 01:00  113 25 115/65 (82) 98 Ventilator  


 


3/31/20 00:25     97 Ventilator  


 


3/31/20 00:00 99.6 114 25 100/57 (71) 99 Ventilator  





 99.6       


 


3/30/20 23:45      Mechanical Ventilator  


 


3/30/20 23:00  115 25 95/69 (78) 99 Ventilator  


 


3/30/20 22:33     99 Ventilator  


 


3/30/20 22:00  114 25 91/66 (74) 99 Ventilator  


 


3/30/20 21:00  116 25 96/58 (71) 99 Ventilator  


 


3/30/20 20:16     99 Ventilator  


 


3/30/20 20:00 99.5 115 25 96/58 (71) 99 Ventilator  





 99.5       


 


3/30/20 19:30      Mechanical Ventilator  


 


3/30/20 19:00  116 25 103/52 (69) 99 Ventilator  


 


3/30/20 18:00  122 25 107/57 (74) 99 Ventilator  


 


3/30/20 17:48     97   


 


3/30/20 17:00  116 25 110/55 (73) 99 Ventilator  


 


3/30/20 16:00      Mechanical Ventilator  


 


3/30/20 16:00 100.3 122 25 111/60 (77) 100 Ventilator  





 100.3       


 


3/30/20 15:55     97   


 


3/30/20 15:16     100  6.0 


 


3/30/20 15:00 100.7 124 25 123/55 (77) 98 Ventilator  





 100.7       


 


3/30/20 14:46     99  6.0 


 


3/30/20 14:00  118 25 119/53 (75) 99 Ventilator  


 


3/30/20 13:39     98   


 


3/30/20 13:00  126 25 112/52 (72) 98 Ventilator  


 


3/30/20 12:04     100   


 


3/30/20 12:00 100.2 122 25 115/69 (84) 99 Ventilator  





 100.2       


 


3/30/20 12:00      Mechanical Ventilator  


 


3/30/20 11:00  114 25 102/62 (75) 100 Ventilator  


 


3/30/20 10:00  117 25 105/51 (69) 99 Ventilator  


 


3/30/20 09:00  118 25 90/65 (73) 99 Ventilator  


 


3/30/20 08:00 99.8 121 25 98/78 (85) 99 Ventilator  





 99.8       


 


3/30/20 08:00      Mechanical Ventilator  


 


3/30/20 07:00  118 26 89/67 (74) 100 Ventilator  











Laboratory


Laboratory





Laboratory Tests








Test


 3/30/20


14:18 3/30/20


14:30 3/30/20


17:32 3/30/20


23:33


 


Glucose (Fingerstick)


 237 mg/dL


(70-99) 


 161 mg/dL


(70-99) 180 mg/dL


(70-99)


 


Sodium Level


 


 137 mmol/L


(136-145) 


 





 


Potassium Level


 


 4.4 mmol/L


(3.5-5.1) 


 





 


Chloride Level


 


 103 mmol/L


() 


 





 


Carbon Dioxide Level


 


 26 mmol/L


(21-32) 


 





 


Anion Gap  8 (6-14)   


 


Blood Urea Nitrogen


 


 41 mg/dL


(7-20) 


 





 


Creatinine


 


 1.6 mg/dL


(0.6-1.0) 


 





 


Estimated GFR


(Cockcroft-Gault) 


 34.3 


 


 





 


Glucose Level


 


 244 mg/dL


(70-99) 


 





 


Calcium Level


 


 8.1 mg/dL


(8.5-10.1) 


 





 


Phosphorus Level


 


 3.0 mg/dL


(2.6-4.7) 


 





 


Magnesium Level


 


 2.2 mg/dL


(1.8-2.4) 


 





 


Test


 3/31/20


00:30 3/31/20


05:30 3/31/20


05:43 3/31/20


08:00


 


Sodium Level


 137 mmol/L


(136-145) 138 mmol/L


(136-145) 


 





 


Potassium Level


 4.1 mmol/L


(3.5-5.1) 4.1 mmol/L


(3.5-5.1) 


 





 


Chloride Level


 104 mmol/L


() 105 mmol/L


() 


 





 


Carbon Dioxide Level


 28 mmol/L


(21-32) 27 mmol/L


(21-32) 


 





 


Anion Gap 5 (6-14)  6 (6-14)   


 


Blood Urea Nitrogen


 35 mg/dL


(7-20) 34 mg/dL


(7-20) 


 





 


Creatinine


 1.3 mg/dL


(0.6-1.0) 1.3 mg/dL


(0.6-1.0) 


 





 


Estimated GFR


(Cockcroft-Gault) 43.5 


 43.5 


 


 





 


Glucose Level


 193 mg/dL


(70-99) 185 mg/dL


(70-99) 


 





 


Calcium Level


 7.9 mg/dL


(8.5-10.1) 8.1 mg/dL


(8.5-10.1) 


 





 


Phosphorus Level


 2.9 mg/dL


(2.6-4.7) 2.9 mg/dL


(2.6-4.7) 


 





 


Magnesium Level


 2.1 mg/dL


(1.8-2.4) 2.4 mg/dL


(1.8-2.4) 


 





 


White Blood Count


 


 35.5 x10^3/uL


(4.0-11.0) 


 





 


Red Blood Count


 


 1.92 x10^6/uL


(3.50-5.40) 


 





 


Hemoglobin


 


 6.5 g/dL


(12.0-15.5) 


 





 


Hematocrit


 


 19.4 %


(36.0-47.0) 


 





 


Mean Corpuscular Volume


 


 101 fL


() 


 





 


Mean Corpuscular Hemoglobin  34 pg (25-35)   


 


Mean Corpuscular Hemoglobin


Concent 


 33 g/dL


(31-37) 


 





 


Red Cell Distribution Width


 


 15.4 %


(11.5-14.5) 


 





 


Platelet Count


 


 354 x10^3/uL


(140-400) 


 





 


Neutrophils (%) (Auto)  82 % (31-73)   


 


Lymphocytes (%) (Auto)  12 % (24-48)   


 


Monocytes (%) (Auto)  5 % (0-9)   


 


Eosinophils (%) (Auto)  1 % (0-3)   


 


Basophils (%) (Auto)  1 % (0-3)   


 


Neutrophils # (Auto)


 


 29.1 x10^3/uL


(1.8-7.7) 


 





 


Lymphocytes # (Auto)


 


 4.1 x10^3/uL


(1.0-4.8) 


 





 


Monocytes # (Auto)


 


 1.7 x10^3/uL


(0.0-1.1) 


 





 


Eosinophils # (Auto)


 


 0.4 x10^3/uL


(0.0-0.7) 


 





 


Basophils # (Auto)


 


 0.2 x10^3/uL


(0.0-0.2) 


 





 


Glucose (Fingerstick)


 


 


 194 mg/dL


(70-99) 





 


O2 Saturation    93 % (92-99) 


 


Arterial Blood pH


 


 


 


 7.34


(7.35-7.45)


 


Arterial Blood pCO2 at


Patient Temp 


 


 


 47 mmHg


(35-46)


 


Arterial Blood pO2 at Patient


Temp 


 


 


 73 mmHg


()


 


Arterial Blood HCO3


 


 


 


 25 mmol/L


(21-28)


 


Arterial Blood Base Excess


 


 


 


 -1 mmol/L


(-3-3)


 


FiO2    40 


 


Test


 3/31/20


12:00 


 


 





 


Prothrombin Time


 21.3 SEC


(11.7-14.0) 


 


 





 


Prothromb Time International


Ratio 1.9 (0.8-1.1) 


 


 


 





 


Sodium Level


 138 mmol/L


(136-145) 


 


 





 


Potassium Level


 3.9 mmol/L


(3.5-5.1) 


 


 





 


Chloride Level


 103 mmol/L


() 


 


 





 


Carbon Dioxide Level


 27 mmol/L


(21-32) 


 


 





 


Anion Gap 8 (6-14)    


 


Blood Urea Nitrogen


 33 mg/dL


(7-20) 


 


 





 


Creatinine


 1.2 mg/dL


(0.6-1.0) 


 


 





 


Estimated GFR


(Cockcroft-Gault) 47.7 


 


 


 





 


Glucose Level


 192 mg/dL


(70-99) 


 


 





 


Calcium Level


 8.0 mg/dL


(8.5-10.1) 


 


 





 


Phosphorus Level


 2.7 mg/dL


(2.6-4.7) 


 


 





 


Magnesium Level


 2.2 mg/dL


(1.8-2.4) 


 


 











Microbiology


3/24/20 Blood Culture - Final, Complete


          NO GROWTH AFTER 5 DAYS





Medication


Medications





Current Medications


Sodium Chloride 90 meq/Potassium Chloride 15 meq/ Potassium Phosphate 18 mmol/ 

Magnesium Sulfate 8 meq/Calcium Gluconate 15 meq/ Multivitamins 10 ml/Chromium/ 

Copper/Manganese/ Seleni/Zn 0.5 ml/ Insulin Human Regular 15 unit/ Total 

Parenteral Nutrition/Amino Acids/Dextrose/ Fat Emulsion Intravenous 1,400 ml @  

58.333 mls/ hr TPN  CONT IV  Last administered on 3/30/20at 21:47;  Start 

3/30/20 at 22:00;  Stop 3/31/20 at 21:59


Sodium Chloride 90 meq/Potassium Chloride 15 meq/ Potassium Phosphate 18 mmol/ 

Magnesium Sulfate 8 meq/Calcium Gluconate 15 meq/ Multivitamins 10 ml/Chromium/ 

Copper/Manganese/ Seleni/Zn 0.5 ml/ Insulin Human Regular 20 unit/ Total 

Parenteral Nutrition/Amino Acids/Dextrose/ Fat Emulsion Intravenous 1,400 ml @  

58.333 mls/ hr TPN  CONT IV ;  Start 3/31/20 at 22:00;  Stop 4/1/20 at 21:59





Comment


Review of Relevant


I have reviewed the following items josy (where applicable) has been applied.











DORYS LANDA MD                 Mar 31, 2020 14:05

## 2020-03-31 NOTE — PDOC
Renal-Progress Notes


Subjective Notes


Notes


INTUBATED





History of Present Illness


Hx of present illness


NO CHANGE





Vitals


Vitals





Vital Signs








  Date Time  Temp Pulse Resp B/P (MAP) Pulse Ox O2 Delivery O2 Flow Rate FiO2


 


3/31/20 11:40     100  6.0 


 


3/31/20 11:00  113 28 114/66 (82)  Ventilator  


 


3/31/20 09:55 99.0       





 99.0       








Weight


Weight [ ]





I.O.


Intake and Output











Intake and Output 


 


 3/31/20





 07:00


 


Intake Total 2651 ml


 


Output Total 540 ml


 


Balance 2111 ml


 


 


 


IV Total 2651 ml


 


Output Urine Total 340 ml


 


Gastric Drainage Total 200 ml











Labs


Labs





Laboratory Tests








Test


 3/30/20


14:18 3/30/20


14:30 3/30/20


17:32 3/30/20


23:33


 


Glucose (Fingerstick)


 237 mg/dL


(70-99) 


 161 mg/dL


(70-99) 180 mg/dL


(70-99)


 


Sodium Level


 


 137 mmol/L


(136-145) 


 





 


Potassium Level


 


 4.4 mmol/L


(3.5-5.1) 


 





 


Chloride Level


 


 103 mmol/L


() 


 





 


Carbon Dioxide Level


 


 26 mmol/L


(21-32) 


 





 


Anion Gap  8 (6-14)   


 


Blood Urea Nitrogen


 


 41 mg/dL


(7-20) 


 





 


Creatinine


 


 1.6 mg/dL


(0.6-1.0) 


 





 


Estimated GFR


(Cockcroft-Gault) 


 34.3 


 


 





 


Glucose Level


 


 244 mg/dL


(70-99) 


 





 


Calcium Level


 


 8.1 mg/dL


(8.5-10.1) 


 





 


Phosphorus Level


 


 3.0 mg/dL


(2.6-4.7) 


 





 


Magnesium Level


 


 2.2 mg/dL


(1.8-2.4) 


 





 


Test


 3/31/20


00:30 3/31/20


05:30 3/31/20


05:43 3/31/20


08:00


 


Sodium Level


 137 mmol/L


(136-145) 138 mmol/L


(136-145) 


 





 


Potassium Level


 4.1 mmol/L


(3.5-5.1) 4.1 mmol/L


(3.5-5.1) 


 





 


Chloride Level


 104 mmol/L


() 105 mmol/L


() 


 





 


Carbon Dioxide Level


 28 mmol/L


(21-32) 27 mmol/L


(21-32) 


 





 


Anion Gap 5 (6-14)  6 (6-14)   


 


Blood Urea Nitrogen


 35 mg/dL


(7-20) 34 mg/dL


(7-20) 


 





 


Creatinine


 1.3 mg/dL


(0.6-1.0) 1.3 mg/dL


(0.6-1.0) 


 





 


Estimated GFR


(Cockcroft-Gault) 43.5 


 43.5 


 


 





 


Glucose Level


 193 mg/dL


(70-99) 185 mg/dL


(70-99) 


 





 


Calcium Level


 7.9 mg/dL


(8.5-10.1) 8.1 mg/dL


(8.5-10.1) 


 





 


Phosphorus Level


 2.9 mg/dL


(2.6-4.7) 2.9 mg/dL


(2.6-4.7) 


 





 


Magnesium Level


 2.1 mg/dL


(1.8-2.4) 2.4 mg/dL


(1.8-2.4) 


 





 


White Blood Count


 


 35.5 x10^3/uL


(4.0-11.0) 


 





 


Red Blood Count


 


 1.92 x10^6/uL


(3.50-5.40) 


 





 


Hemoglobin


 


 6.5 g/dL


(12.0-15.5) 


 





 


Hematocrit


 


 19.4 %


(36.0-47.0) 


 





 


Mean Corpuscular Volume


 


 101 fL


() 


 





 


Mean Corpuscular Hemoglobin  34 pg (25-35)   


 


Mean Corpuscular Hemoglobin


Concent 


 33 g/dL


(31-37) 


 





 


Red Cell Distribution Width


 


 15.4 %


(11.5-14.5) 


 





 


Platelet Count


 


 354 x10^3/uL


(140-400) 


 





 


Neutrophils (%) (Auto)  82 % (31-73)   


 


Lymphocytes (%) (Auto)  12 % (24-48)   


 


Monocytes (%) (Auto)  5 % (0-9)   


 


Eosinophils (%) (Auto)  1 % (0-3)   


 


Basophils (%) (Auto)  1 % (0-3)   


 


Neutrophils # (Auto)


 


 29.1 x10^3/uL


(1.8-7.7) 


 





 


Lymphocytes # (Auto)


 


 4.1 x10^3/uL


(1.0-4.8) 


 





 


Monocytes # (Auto)


 


 1.7 x10^3/uL


(0.0-1.1) 


 





 


Eosinophils # (Auto)


 


 0.4 x10^3/uL


(0.0-0.7) 


 





 


Basophils # (Auto)


 


 0.2 x10^3/uL


(0.0-0.2) 


 





 


Glucose (Fingerstick)


 


 


 194 mg/dL


(70-99) 





 


O2 Saturation    93 % (92-99) 


 


Arterial Blood pH


 


 


 


 7.34


(7.35-7.45)


 


Arterial Blood pCO2 at


Patient Temp 


 


 


 47 mmHg


(35-46)


 


Arterial Blood pO2 at Patient


Temp 


 


 


 73 mmHg


()


 


Arterial Blood HCO3


 


 


 


 25 mmol/L


(21-28)


 


Arterial Blood Base Excess


 


 


 


 -1 mmol/L


(-3-3)


 


FiO2    40 











Micro


Micro





Microbiology


3/24/20 Blood Culture - Final, Complete


          NO GROWTH AFTER 5 DAYS





Review of Systems


Constitutional:  yes: other (ON THE VENT)





Physical Exam


General Appearance:  other (ON THE VENT)


Skin:  warm


Respiratory:  decreased breath sounds


Heart:  S1S2


Abdomen:  soft, bowel sounds present


Genitourinary:  bladder flat


Extremities:  pulses present, edema


Neurology:  other (SEDATED)





Assessment


Assessment


IMP





LEI-PRL-QLXYTT-CR OF 1.3 ON CRRT


ANEMIA


LEUCOCYTOSIS


ANASARCA DUE TO 3RD SPACING


HYPERKALEMIA-RESOLVED


ACIDOSIS AND ACIDEMIA-CONTROLLED


ACUTE REPS FAILURE


ACUTE PANCREATITIS


HYPOALBUMINEMIA


HYPOCALCEMIA-BETTER





PLAN





TPN


PRBC


START YOLI


CONT WITH CRRT-CHANGE PFR TO ZERO


CONT SAME RF AND DIALYSATE


CT PENDING


VENT SUPPORT


PRESSORS AS NEEDED


ANTIBIOTICS


WILL FOLLOW


CCT 30


D/W DR MASON


VERY POOR PROGNOSIS











BETH HEIN MD                 Mar 31, 2020 12:00

## 2020-03-31 NOTE — PDOC
Infectious Disease Note


Subjective


Subjective


intubated sedated





ROS


ROS


no n/v/d/sob





Vital Sign


Vital Signs





Vital Signs








  Date Time  Temp Pulse Resp B/P (MAP) Pulse Ox O2 Delivery O2 Flow Rate FiO2


 


3/31/20 08:27     98 Ventilator  


 


3/31/20 07:47 99.0 116 25 114/46    





 99.0       


 


3/30/20 15:16       6.0 











Physical Exam


PHYSICAL EXAM


GENERAL: Orally intubated/sedated - generalizd anasarca 


HEENT: Mild icterus, pupils equal, ETT, NGT


NECK:  Supple. 


LUNGS:  Decreased breath sounds at the bases.


HEART:  S1, S2, regular 


ABDOMEN:  Distended, hypoactive BS, Rectal tube in place 


: [place]   


EXTREMITIES: Generalized edema, no cyanosis - some mottling, [place] boots 

bilaterally 


DERMATOLOGIC:  Warm and dry.  No generalized rash.  


CENTRAL NERVOUS SYSTEM: Sedated 


RIJ Temp HDC, RUE-PICC & LIJ - clean





Labs


Lab





Laboratory Tests








Test


 3/30/20


14:18 3/30/20


14:30 3/30/20


17:32 3/30/20


23:33


 


Glucose (Fingerstick)


 237 mg/dL


(70-99) 


 161 mg/dL


(70-99) 180 mg/dL


(70-99)


 


Sodium Level


 


 137 mmol/L


(136-145) 


 





 


Potassium Level


 


 4.4 mmol/L


(3.5-5.1) 


 





 


Chloride Level


 


 103 mmol/L


() 


 





 


Carbon Dioxide Level


 


 26 mmol/L


(21-32) 


 





 


Anion Gap  8 (6-14)   


 


Blood Urea Nitrogen


 


 41 mg/dL


(7-20) 


 





 


Creatinine


 


 1.6 mg/dL


(0.6-1.0) 


 





 


Estimated GFR


(Cockcroft-Gault) 


 34.3 


 


 





 


Glucose Level


 


 244 mg/dL


(70-99) 


 





 


Calcium Level


 


 8.1 mg/dL


(8.5-10.1) 


 





 


Phosphorus Level


 


 3.0 mg/dL


(2.6-4.7) 


 





 


Magnesium Level


 


 2.2 mg/dL


(1.8-2.4) 


 





 


Test


 3/31/20


00:30 3/31/20


05:30 3/31/20


05:43 





 


Sodium Level


 137 mmol/L


(136-145) 138 mmol/L


(136-145) 


 





 


Potassium Level


 4.1 mmol/L


(3.5-5.1) 4.1 mmol/L


(3.5-5.1) 


 





 


Chloride Level


 104 mmol/L


() 105 mmol/L


() 


 





 


Carbon Dioxide Level


 28 mmol/L


(21-32) 27 mmol/L


(21-32) 


 





 


Anion Gap 5 (6-14)  6 (6-14)   


 


Blood Urea Nitrogen


 35 mg/dL


(7-20) 34 mg/dL


(7-20) 


 





 


Creatinine


 1.3 mg/dL


(0.6-1.0) 1.3 mg/dL


(0.6-1.0) 


 





 


Estimated GFR


(Cockcroft-Gault) 43.5 


 43.5 


 


 





 


Glucose Level


 193 mg/dL


(70-99) 185 mg/dL


(70-99) 


 





 


Calcium Level


 7.9 mg/dL


(8.5-10.1) 8.1 mg/dL


(8.5-10.1) 


 





 


Phosphorus Level


 2.9 mg/dL


(2.6-4.7) 2.9 mg/dL


(2.6-4.7) 


 





 


Magnesium Level


 2.1 mg/dL


(1.8-2.4) 2.4 mg/dL


(1.8-2.4) 


 





 


White Blood Count


 


 35.5 x10^3/uL


(4.0-11.0) 


 





 


Red Blood Count


 


 1.92 x10^6/uL


(3.50-5.40) 


 





 


Hemoglobin


 


 6.5 g/dL


(12.0-15.5) 


 





 


Hematocrit


 


 19.4 %


(36.0-47.0) 


 





 


Mean Corpuscular Volume


 


 101 fL


() 


 





 


Mean Corpuscular Hemoglobin  34 pg (25-35)   


 


Mean Corpuscular Hemoglobin


Concent 


 33 g/dL


(31-37) 


 





 


Red Cell Distribution Width


 


 15.4 %


(11.5-14.5) 


 





 


Platelet Count


 


 354 x10^3/uL


(140-400) 


 





 


Neutrophils (%) (Auto)  82 % (31-73)   


 


Lymphocytes (%) (Auto)  12 % (24-48)   


 


Monocytes (%) (Auto)  5 % (0-9)   


 


Eosinophils (%) (Auto)  1 % (0-3)   


 


Basophils (%) (Auto)  1 % (0-3)   


 


Neutrophils # (Auto)


 


 29.1 x10^3/uL


(1.8-7.7) 


 





 


Lymphocytes # (Auto)


 


 4.1 x10^3/uL


(1.0-4.8) 


 





 


Monocytes # (Auto)


 


 1.7 x10^3/uL


(0.0-1.1) 


 





 


Eosinophils # (Auto)


 


 0.4 x10^3/uL


(0.0-0.7) 


 





 


Basophils # (Auto)


 


 0.2 x10^3/uL


(0.0-0.2) 


 





 


Glucose (Fingerstick)


 


 


 194 mg/dL


(70-99) 











Micro





Microbiology


3/24/20 Blood Culture - Final, Complete


          NO GROWTH AFTER 5 DAYS





Objective


Assessment


Leukocytosis - increased - ? reactive - trouble with CRRT/S/p PRBCs ? intra-

abdominal 


Fever - better - Flu neg antigen 3/26 ? reactive with pancreatitis vs ID - C-

diff neg. S/p HD cath change with malfunction 3/25 - cults 3/24 neg


Lung opacities, COVID-19 neg 


Hypotension 


Acute pancreatitis, early developing necrosis -U/S 3/26 reviewed


JED,Hyperkalemia, Metabolic acidosis on CRRT


   - s/p RIJ temporary dialysis catheter replacement, 3/25


Anasarca - worse


Acute hypoxic resp failure, intubated


? developing peripheral ischemia - better


Cholelithiasis


Anemia - S/p PRBC 3/26


Hypocalcemia 


Prediabetes


HTN





Plan


Plan of Care


Continue Dapto/cefepime (3/25), Flagyl and micafungin (3/23) 


   -Previously on Merrem, Zyvox 


F/u Blood cults 3/24 so far neg





No surgical plans at this time


Maintain aspiration precautions 


Airborne isolation d/c'd. COVID-19 neg  


Repeat CBC





need ct chest, abd and pelvis,,, noted


d/w GI


D/w nursing





Critically ill











LINN FRANZ MD               Mar 31, 2020 08:42

## 2020-03-31 NOTE — RAD
CHEST AP ONLY

 

Clinical indications: On the ventilator. Follow-up study.

 

COMPARISON: March 30, 2020. 

 

FINDINGS/

IMPRESSION: There've been no interval tube or line changes. No 

pneumothorax is seen. Bilateral vascular congestion and perihilar 

pulmonary edema and pleural effusions are still evident which are 

unchanged. The heart size and mediastinum are stable.

 

Electronically signed by: Daniel Ramachandran MD (3/31/2020 10:04 AM) 

[id number]

## 2020-03-31 NOTE — PDOC
PULMONARY PROGRESS NOTES


Subjective





Patient intubated on 3/23 , sedated


Currently on assist control ventilation  450 tidal volume 8 of PEEP , 40%FIO2 ON

CRRT


Vitals





Vital Signs








  Date Time  Temp Pulse Resp B/P (MAP) Pulse Ox O2 Delivery O2 Flow Rate FiO2


 


3/31/20 09:55 99.0 112 26 122/75    





 99.0       


 


3/31/20 08:27     98 Ventilator  


 


3/30/20 15:16       6.0 








Comments


virtual exam done via telemedicine


intubated/ sedated, mildly tachypnic, tachycardic


no rash


mild edema


Extremities:  Other (trace edema)


Labs





Laboratory Tests








Test


 3/29/20


12:10 3/29/20


13:00 3/29/20


17:45 3/29/20


18:25


 


Glucose (Fingerstick)


 204 mg/dL


(70-99) 


 206 mg/dL


(70-99) 





 


Sodium Level


 


 138 mmol/L


(136-145) 


 138 mmol/L


(136-145)


 


Potassium Level


 


 4.4 mmol/L


(3.5-5.1) 


 4.3 mmol/L


(3.5-5.1)


 


Chloride Level


 


 103 mmol/L


() 


 105 mmol/L


()


 


Carbon Dioxide Level


 


 27 mmol/L


(21-32) 


 28 mmol/L


(21-32)


 


Anion Gap  8 (6-14)   5 (6-14) 


 


Blood Urea Nitrogen


 


 34 mg/dL


(7-20) 


 36 mg/dL


(7-20)


 


Creatinine


 


 1.2 mg/dL


(0.6-1.0) 


 1.3 mg/dL


(0.6-1.0)


 


Estimated GFR


(Cockcroft-Gault) 


 47.7 


 


 43.5 





 


Glucose Level


 


 210 mg/dL


(70-99) 


 211 mg/dL


(70-99)


 


Calcium Level


 


 8.2 mg/dL


(8.5-10.1) 


 7.8 mg/dL


(8.5-10.1)


 


Phosphorus Level


 


 2.6 mg/dL


(2.6-4.7) 


 2.8 mg/dL


(2.6-4.7)


 


Magnesium Level


 


 2.3 mg/dL


(1.8-2.4) 


 2.3 mg/dL


(1.8-2.4)


 


Test


 3/29/20


23:50 3/30/20


00:04 3/30/20


05:30 3/30/20


05:35


 


Sodium Level


 137 mmol/L


(136-145) 


 136 mmol/L


(136-145) 





 


Potassium Level


 4.4 mmol/L


(3.5-5.1) 


 4.3 mmol/L


(3.5-5.1) 





 


Chloride Level


 104 mmol/L


() 


 103 mmol/L


() 





 


Carbon Dioxide Level


 27 mmol/L


(21-32) 


 27 mmol/L


(21-32) 





 


Anion Gap 6 (6-14)   6 (6-14)  


 


Blood Urea Nitrogen


 37 mg/dL


(7-20) 


 35 mg/dL


(7-20) 





 


Creatinine


 1.3 mg/dL


(0.6-1.0) 


 1.3 mg/dL


(0.6-1.0) 





 


Estimated GFR


(Cockcroft-Gault) 43.5 


 


 43.5 


 





 


Glucose Level


 213 mg/dL


(70-99) 


 204 mg/dL


(70-99) 





 


Calcium Level


 7.9 mg/dL


(8.5-10.1) 


 8.1 mg/dL


(8.5-10.1) 





 


Phosphorus Level


 2.9 mg/dL


(2.6-4.7) 


 2.9 mg/dL


(2.6-4.7) 





 


Magnesium Level


 2.3 mg/dL


(1.8-2.4) 


 2.4 mg/dL


(1.8-2.4) 





 


Glucose (Fingerstick)


 


 177 mg/dL


(70-99) 


 187 mg/dL


(70-99)


 


White Blood Count


 


 


 45.0 x10^3/uL


(4.0-11.0) 





 


Red Blood Count


 


 


 2.20 x10^6/uL


(3.50-5.40) 





 


Hemoglobin


 


 


 7.3 g/dL


(12.0-15.5) 





 


Hematocrit


 


 


 22.0 %


(36.0-47.0) 





 


Mean Corpuscular Volume


 


 


 100 fL


() 





 


Mean Corpuscular Hemoglobin   33 pg (25-35)  


 


Mean Corpuscular Hemoglobin


Concent 


 


 33 g/dL


(31-37) 





 


Red Cell Distribution Width


 


 


 15.5 %


(11.5-14.5) 





 


Platelet Count


 


 


 383 x10^3/uL


(140-400) 





 


Neutrophils (%) (Auto)   85 % (31-73)  


 


Lymphocytes (%) (Auto)   9 % (24-48)  


 


Monocytes (%) (Auto)   4 % (0-9)  


 


Eosinophils (%) (Auto)   1 % (0-3)  


 


Basophils (%) (Auto)   0 % (0-3)  


 


Neutrophils # (Auto)


 


 


 38.4 x10^3/uL


(1.8-7.7) 





 


Lymphocytes # (Auto)


 


 


 4.0 x10^3/uL


(1.0-4.8) 





 


Monocytes # (Auto)


 


 


 2.0 x10^3/uL


(0.0-1.1) 





 


Eosinophils # (Auto)


 


 


 0.5 x10^3/uL


(0.0-0.7) 





 


Basophils # (Auto)


 


 


 0.2 x10^3/uL


(0.0-0.2) 





 


Segmented Neutrophils %   68 % (35-66)  


 


Band Neutrophils %   19 % (0-9)  


 


Lymphocytes %   6 % (24-48)  


 


Monocytes %   5 % (0-10)  


 


Eosinophils %   1 % (0-5)  


 


Myelocytes %   1 % (0-0)  


 


Nucleated Red Blood Cells   4  


 


Toxic Granulation   Present  


 


Platelet Estimate


 


 


 Adequate


(ADEQUATE) 





 


Large Platelets   Present  


 


Polychromasia   Present  


 


Anisocytosis   Slight  


 


Macrocytosis   Present  


 


Test


 3/30/20


14:18 3/30/20


14:30 3/30/20


17:32 3/30/20


23:33


 


Glucose (Fingerstick)


 237 mg/dL


(70-99) 


 161 mg/dL


(70-99) 180 mg/dL


(70-99)


 


Sodium Level


 


 137 mmol/L


(136-145) 


 





 


Potassium Level


 


 4.4 mmol/L


(3.5-5.1) 


 





 


Chloride Level


 


 103 mmol/L


() 


 





 


Carbon Dioxide Level


 


 26 mmol/L


(21-32) 


 





 


Anion Gap  8 (6-14)   


 


Blood Urea Nitrogen


 


 41 mg/dL


(7-20) 


 





 


Creatinine


 


 1.6 mg/dL


(0.6-1.0) 


 





 


Estimated GFR


(Cockcroft-Gault) 


 34.3 


 


 





 


Glucose Level


 


 244 mg/dL


(70-99) 


 





 


Calcium Level


 


 8.1 mg/dL


(8.5-10.1) 


 





 


Phosphorus Level


 


 3.0 mg/dL


(2.6-4.7) 


 





 


Magnesium Level


 


 2.2 mg/dL


(1.8-2.4) 


 





 


Test


 3/31/20


00:30 3/31/20


05:30 3/31/20


05:43 3/31/20


08:00


 


Sodium Level


 137 mmol/L


(136-145) 138 mmol/L


(136-145) 


 





 


Potassium Level


 4.1 mmol/L


(3.5-5.1) 4.1 mmol/L


(3.5-5.1) 


 





 


Chloride Level


 104 mmol/L


() 105 mmol/L


() 


 





 


Carbon Dioxide Level


 28 mmol/L


(21-32) 27 mmol/L


(21-32) 


 





 


Anion Gap 5 (6-14)  6 (6-14)   


 


Blood Urea Nitrogen


 35 mg/dL


(7-20) 34 mg/dL


(7-20) 


 





 


Creatinine


 1.3 mg/dL


(0.6-1.0) 1.3 mg/dL


(0.6-1.0) 


 





 


Estimated GFR


(Cockcroft-Gault) 43.5 


 43.5 


 


 





 


Glucose Level


 193 mg/dL


(70-99) 185 mg/dL


(70-99) 


 





 


Calcium Level


 7.9 mg/dL


(8.5-10.1) 8.1 mg/dL


(8.5-10.1) 


 





 


Phosphorus Level


 2.9 mg/dL


(2.6-4.7) 2.9 mg/dL


(2.6-4.7) 


 





 


Magnesium Level


 2.1 mg/dL


(1.8-2.4) 2.4 mg/dL


(1.8-2.4) 


 





 


White Blood Count


 


 35.5 x10^3/uL


(4.0-11.0) 


 





 


Red Blood Count


 


 1.92 x10^6/uL


(3.50-5.40) 


 





 


Hemoglobin


 


 6.5 g/dL


(12.0-15.5) 


 





 


Hematocrit


 


 19.4 %


(36.0-47.0) 


 





 


Mean Corpuscular Volume


 


 101 fL


() 


 





 


Mean Corpuscular Hemoglobin  34 pg (25-35)   


 


Mean Corpuscular Hemoglobin


Concent 


 33 g/dL


(31-37) 


 





 


Red Cell Distribution Width


 


 15.4 %


(11.5-14.5) 


 





 


Platelet Count


 


 354 x10^3/uL


(140-400) 


 





 


Neutrophils (%) (Auto)  82 % (31-73)   


 


Lymphocytes (%) (Auto)  12 % (24-48)   


 


Monocytes (%) (Auto)  5 % (0-9)   


 


Eosinophils (%) (Auto)  1 % (0-3)   


 


Basophils (%) (Auto)  1 % (0-3)   


 


Neutrophils # (Auto)


 


 29.1 x10^3/uL


(1.8-7.7) 


 





 


Lymphocytes # (Auto)


 


 4.1 x10^3/uL


(1.0-4.8) 


 





 


Monocytes # (Auto)


 


 1.7 x10^3/uL


(0.0-1.1) 


 





 


Eosinophils # (Auto)


 


 0.4 x10^3/uL


(0.0-0.7) 


 





 


Basophils # (Auto)


 


 0.2 x10^3/uL


(0.0-0.2) 


 





 


Glucose (Fingerstick)


 


 


 194 mg/dL


(70-99) 





 


O2 Saturation    93 % (92-99) 


 


Arterial Blood pH


 


 


 


 7.34


(7.35-7.45)


 


Arterial Blood pCO2 at


Patient Temp 


 


 


 47 mmHg


(35-46)


 


Arterial Blood pO2 at Patient


Temp 


 


 


 73 mmHg


()


 


Arterial Blood HCO3


 


 


 


 25 mmol/L


(21-28)


 


Arterial Blood Base Excess


 


 


 


 -1 mmol/L


(-3-3)


 


FiO2    40 








Laboratory Tests








Test


 3/30/20


14:18 3/30/20


14:30 3/30/20


17:32 3/30/20


23:33


 


Glucose (Fingerstick)


 237 mg/dL


(70-99) 


 161 mg/dL


(70-99) 180 mg/dL


(70-99)


 


Sodium Level


 


 137 mmol/L


(136-145) 


 





 


Potassium Level


 


 4.4 mmol/L


(3.5-5.1) 


 





 


Chloride Level


 


 103 mmol/L


() 


 





 


Carbon Dioxide Level


 


 26 mmol/L


(21-32) 


 





 


Anion Gap  8 (6-14)   


 


Blood Urea Nitrogen


 


 41 mg/dL


(7-20) 


 





 


Creatinine


 


 1.6 mg/dL


(0.6-1.0) 


 





 


Estimated GFR


(Cockcroft-Gault) 


 34.3 


 


 





 


Glucose Level


 


 244 mg/dL


(70-99) 


 





 


Calcium Level


 


 8.1 mg/dL


(8.5-10.1) 


 





 


Phosphorus Level


 


 3.0 mg/dL


(2.6-4.7) 


 





 


Magnesium Level


 


 2.2 mg/dL


(1.8-2.4) 


 





 


Test


 3/31/20


00:30 3/31/20


05:30 3/31/20


05:43 3/31/20


08:00


 


Sodium Level


 137 mmol/L


(136-145) 138 mmol/L


(136-145) 


 





 


Potassium Level


 4.1 mmol/L


(3.5-5.1) 4.1 mmol/L


(3.5-5.1) 


 





 


Chloride Level


 104 mmol/L


() 105 mmol/L


() 


 





 


Carbon Dioxide Level


 28 mmol/L


(21-32) 27 mmol/L


(21-32) 


 





 


Anion Gap 5 (6-14)  6 (6-14)   


 


Blood Urea Nitrogen


 35 mg/dL


(7-20) 34 mg/dL


(7-20) 


 





 


Creatinine


 1.3 mg/dL


(0.6-1.0) 1.3 mg/dL


(0.6-1.0) 


 





 


Estimated GFR


(Cockcroft-Gault) 43.5 


 43.5 


 


 





 


Glucose Level


 193 mg/dL


(70-99) 185 mg/dL


(70-99) 


 





 


Calcium Level


 7.9 mg/dL


(8.5-10.1) 8.1 mg/dL


(8.5-10.1) 


 





 


Phosphorus Level


 2.9 mg/dL


(2.6-4.7) 2.9 mg/dL


(2.6-4.7) 


 





 


Magnesium Level


 2.1 mg/dL


(1.8-2.4) 2.4 mg/dL


(1.8-2.4) 


 





 


White Blood Count


 


 35.5 x10^3/uL


(4.0-11.0) 


 





 


Red Blood Count


 


 1.92 x10^6/uL


(3.50-5.40) 


 





 


Hemoglobin


 


 6.5 g/dL


(12.0-15.5) 


 





 


Hematocrit


 


 19.4 %


(36.0-47.0) 


 





 


Mean Corpuscular Volume


 


 101 fL


() 


 





 


Mean Corpuscular Hemoglobin  34 pg (25-35)   


 


Mean Corpuscular Hemoglobin


Concent 


 33 g/dL


(31-37) 


 





 


Red Cell Distribution Width


 


 15.4 %


(11.5-14.5) 


 





 


Platelet Count


 


 354 x10^3/uL


(140-400) 


 





 


Neutrophils (%) (Auto)  82 % (31-73)   


 


Lymphocytes (%) (Auto)  12 % (24-48)   


 


Monocytes (%) (Auto)  5 % (0-9)   


 


Eosinophils (%) (Auto)  1 % (0-3)   


 


Basophils (%) (Auto)  1 % (0-3)   


 


Neutrophils # (Auto)


 


 29.1 x10^3/uL


(1.8-7.7) 


 





 


Lymphocytes # (Auto)


 


 4.1 x10^3/uL


(1.0-4.8) 


 





 


Monocytes # (Auto)


 


 1.7 x10^3/uL


(0.0-1.1) 


 





 


Eosinophils # (Auto)


 


 0.4 x10^3/uL


(0.0-0.7) 


 





 


Basophils # (Auto)


 


 0.2 x10^3/uL


(0.0-0.2) 


 





 


Glucose (Fingerstick)


 


 


 194 mg/dL


(70-99) 





 


O2 Saturation    93 % (92-99) 


 


Arterial Blood pH


 


 


 


 7.34


(7.35-7.45)


 


Arterial Blood pCO2 at


Patient Temp 


 


 


 47 mmHg


(35-46)


 


Arterial Blood pO2 at Patient


Temp 


 


 


 73 mmHg


()


 


Arterial Blood HCO3


 


 


 


 25 mmol/L


(21-28)


 


Arterial Blood Base Excess


 


 


 


 -1 mmol/L


(-3-3)


 


FiO2    40 








Medications





Active Scripts








 Medications  Dose


 Route/Sig


 Max Daily Dose Days Date Category


 


 Bisoprolol


 Fumarate 5 Mg


 Tablet  10 Mg


 PO DAILY


   3/16/20 Reported








Comments


Chest x-ray reviewed


CT chest 3/30


reviewed





Impression


.


IMPRESSION:


1.  Acute hypoxemic respiratory failure secondary to ARDS due to acute 

pancreatitis, septic shock, abdominal distention, and pneumonia.and pleural 

effusions.  Pleural effusions secondary to abdominal process and/or general 

volume overload from renal failure.


2.  Gallstone pancreatitis. WITH NECROSIS


3.  Severe metabolic acidosis.


4.  Acute kidney injury. ON CRRT


5.  Acute gallstone pancreatitis.


6.  Hypoalbuminemia.


7.  Hypocalcemia.


8.  Leukocytosis


9.  Chronic anemia


10. Covid 19 testing negative


11. Acute /chronic anemia suspected hemorrhagic fluid in pelvis





Plan


.


AC mode, no change in current FIO2/ PEEP


Not ready for trial


Cont AC mode , 40 FIO2/  PEEP.  


Transfuse today


await surgery rec regarding ? blood in pelvis


Poor prognosis


supportive care


CRRT


Antibiotics per ID


Follow nephrology input


Nutritional support with TPN


Prognosis is extremely poor


d/w RN/


no intervention for effusions


cct 35 min











SHARYN SOLORZANO MD                 Mar 31, 2020 10:09

## 2020-03-31 NOTE — PDOC
SAURAV NASH APRN 3/31/20 1004:


SURGICAL PROGRESS NOTE


Subjective


d/w nurse


family requesting call from Dr Flores


Vital Signs





Vital Signs








  Date Time  Temp Pulse Resp B/P (MAP) Pulse Ox O2 Delivery O2 Flow Rate FiO2


 


3/31/20 09:55 99.0 112 26 122/75    





 99.0       


 


3/31/20 08:27     98 Ventilator  


 


3/30/20 15:16       6.0 








I&O











Intake and Output 


 


 3/31/20





 06:59


 


Intake Total 2651 ml


 


Output Total 540 ml


 


Balance 2111 ml


 


 


 


IV Total 2651 ml


 


Output Urine Total 340 ml


 


Gastric Drainage Total 200 ml








General:  Other (sedated )


HEENT:  Other (vent)


Abdomen:  Other (distended, firm)


Labs





Laboratory Tests








Test


 3/29/20


12:10 3/29/20


13:00 3/29/20


17:45 3/29/20


18:25


 


Glucose (Fingerstick)


 204 mg/dL


(70-99) 


 206 mg/dL


(70-99) 





 


Sodium Level


 


 138 mmol/L


(136-145) 


 138 mmol/L


(136-145)


 


Potassium Level


 


 4.4 mmol/L


(3.5-5.1) 


 4.3 mmol/L


(3.5-5.1)


 


Chloride Level


 


 103 mmol/L


() 


 105 mmol/L


()


 


Carbon Dioxide Level


 


 27 mmol/L


(21-32) 


 28 mmol/L


(21-32)


 


Anion Gap  8 (6-14)   5 (6-14) 


 


Blood Urea Nitrogen


 


 34 mg/dL


(7-20) 


 36 mg/dL


(7-20)


 


Creatinine


 


 1.2 mg/dL


(0.6-1.0) 


 1.3 mg/dL


(0.6-1.0)


 


Estimated GFR


(Cockcroft-Gault) 


 47.7 


 


 43.5 





 


Glucose Level


 


 210 mg/dL


(70-99) 


 211 mg/dL


(70-99)


 


Calcium Level


 


 8.2 mg/dL


(8.5-10.1) 


 7.8 mg/dL


(8.5-10.1)


 


Phosphorus Level


 


 2.6 mg/dL


(2.6-4.7) 


 2.8 mg/dL


(2.6-4.7)


 


Magnesium Level


 


 2.3 mg/dL


(1.8-2.4) 


 2.3 mg/dL


(1.8-2.4)


 


Test


 3/29/20


23:50 3/30/20


00:04 3/30/20


05:30 3/30/20


05:35


 


Sodium Level


 137 mmol/L


(136-145) 


 136 mmol/L


(136-145) 





 


Potassium Level


 4.4 mmol/L


(3.5-5.1) 


 4.3 mmol/L


(3.5-5.1) 





 


Chloride Level


 104 mmol/L


() 


 103 mmol/L


() 





 


Carbon Dioxide Level


 27 mmol/L


(21-32) 


 27 mmol/L


(21-32) 





 


Anion Gap 6 (6-14)   6 (6-14)  


 


Blood Urea Nitrogen


 37 mg/dL


(7-20) 


 35 mg/dL


(7-20) 





 


Creatinine


 1.3 mg/dL


(0.6-1.0) 


 1.3 mg/dL


(0.6-1.0) 





 


Estimated GFR


(Cockcroft-Gault) 43.5 


 


 43.5 


 





 


Glucose Level


 213 mg/dL


(70-99) 


 204 mg/dL


(70-99) 





 


Calcium Level


 7.9 mg/dL


(8.5-10.1) 


 8.1 mg/dL


(8.5-10.1) 





 


Phosphorus Level


 2.9 mg/dL


(2.6-4.7) 


 2.9 mg/dL


(2.6-4.7) 





 


Magnesium Level


 2.3 mg/dL


(1.8-2.4) 


 2.4 mg/dL


(1.8-2.4) 





 


Glucose (Fingerstick)


 


 177 mg/dL


(70-99) 


 187 mg/dL


(70-99)


 


White Blood Count


 


 


 45.0 x10^3/uL


(4.0-11.0) 





 


Red Blood Count


 


 


 2.20 x10^6/uL


(3.50-5.40) 





 


Hemoglobin


 


 


 7.3 g/dL


(12.0-15.5) 





 


Hematocrit


 


 


 22.0 %


(36.0-47.0) 





 


Mean Corpuscular Volume


 


 


 100 fL


() 





 


Mean Corpuscular Hemoglobin   33 pg (25-35)  


 


Mean Corpuscular Hemoglobin


Concent 


 


 33 g/dL


(31-37) 





 


Red Cell Distribution Width


 


 


 15.5 %


(11.5-14.5) 





 


Platelet Count


 


 


 383 x10^3/uL


(140-400) 





 


Neutrophils (%) (Auto)   85 % (31-73)  


 


Lymphocytes (%) (Auto)   9 % (24-48)  


 


Monocytes (%) (Auto)   4 % (0-9)  


 


Eosinophils (%) (Auto)   1 % (0-3)  


 


Basophils (%) (Auto)   0 % (0-3)  


 


Neutrophils # (Auto)


 


 


 38.4 x10^3/uL


(1.8-7.7) 





 


Lymphocytes # (Auto)


 


 


 4.0 x10^3/uL


(1.0-4.8) 





 


Monocytes # (Auto)


 


 


 2.0 x10^3/uL


(0.0-1.1) 





 


Eosinophils # (Auto)


 


 


 0.5 x10^3/uL


(0.0-0.7) 





 


Basophils # (Auto)


 


 


 0.2 x10^3/uL


(0.0-0.2) 





 


Segmented Neutrophils %   68 % (35-66)  


 


Band Neutrophils %   19 % (0-9)  


 


Lymphocytes %   6 % (24-48)  


 


Monocytes %   5 % (0-10)  


 


Eosinophils %   1 % (0-5)  


 


Myelocytes %   1 % (0-0)  


 


Nucleated Red Blood Cells   4  


 


Toxic Granulation   Present  


 


Platelet Estimate


 


 


 Adequate


(ADEQUATE) 





 


Large Platelets   Present  


 


Polychromasia   Present  


 


Anisocytosis   Slight  


 


Macrocytosis   Present  


 


Test


 3/30/20


14:18 3/30/20


14:30 3/30/20


17:32 3/30/20


23:33


 


Glucose (Fingerstick)


 237 mg/dL


(70-99) 


 161 mg/dL


(70-99) 180 mg/dL


(70-99)


 


Sodium Level


 


 137 mmol/L


(136-145) 


 





 


Potassium Level


 


 4.4 mmol/L


(3.5-5.1) 


 





 


Chloride Level


 


 103 mmol/L


() 


 





 


Carbon Dioxide Level


 


 26 mmol/L


(21-32) 


 





 


Anion Gap  8 (6-14)   


 


Blood Urea Nitrogen


 


 41 mg/dL


(7-20) 


 





 


Creatinine


 


 1.6 mg/dL


(0.6-1.0) 


 





 


Estimated GFR


(Cockcroft-Gault) 


 34.3 


 


 





 


Glucose Level


 


 244 mg/dL


(70-99) 


 





 


Calcium Level


 


 8.1 mg/dL


(8.5-10.1) 


 





 


Phosphorus Level


 


 3.0 mg/dL


(2.6-4.7) 


 





 


Magnesium Level


 


 2.2 mg/dL


(1.8-2.4) 


 





 


Test


 3/31/20


00:30 3/31/20


05:30 3/31/20


05:43 3/31/20


08:00


 


Sodium Level


 137 mmol/L


(136-145) 138 mmol/L


(136-145) 


 





 


Potassium Level


 4.1 mmol/L


(3.5-5.1) 4.1 mmol/L


(3.5-5.1) 


 





 


Chloride Level


 104 mmol/L


() 105 mmol/L


() 


 





 


Carbon Dioxide Level


 28 mmol/L


(21-32) 27 mmol/L


(21-32) 


 





 


Anion Gap 5 (6-14)  6 (6-14)   


 


Blood Urea Nitrogen


 35 mg/dL


(7-20) 34 mg/dL


(7-20) 


 





 


Creatinine


 1.3 mg/dL


(0.6-1.0) 1.3 mg/dL


(0.6-1.0) 


 





 


Estimated GFR


(Cockcroft-Gault) 43.5 


 43.5 


 


 





 


Glucose Level


 193 mg/dL


(70-99) 185 mg/dL


(70-99) 


 





 


Calcium Level


 7.9 mg/dL


(8.5-10.1) 8.1 mg/dL


(8.5-10.1) 


 





 


Phosphorus Level


 2.9 mg/dL


(2.6-4.7) 2.9 mg/dL


(2.6-4.7) 


 





 


Magnesium Level


 2.1 mg/dL


(1.8-2.4) 2.4 mg/dL


(1.8-2.4) 


 





 


White Blood Count


 


 35.5 x10^3/uL


(4.0-11.0) 


 





 


Red Blood Count


 


 1.92 x10^6/uL


(3.50-5.40) 


 





 


Hemoglobin


 


 6.5 g/dL


(12.0-15.5) 


 





 


Hematocrit


 


 19.4 %


(36.0-47.0) 


 





 


Mean Corpuscular Volume


 


 101 fL


() 


 





 


Mean Corpuscular Hemoglobin  34 pg (25-35)   


 


Mean Corpuscular Hemoglobin


Concent 


 33 g/dL


(31-37) 


 





 


Red Cell Distribution Width


 


 15.4 %


(11.5-14.5) 


 





 


Platelet Count


 


 354 x10^3/uL


(140-400) 


 





 


Neutrophils (%) (Auto)  82 % (31-73)   


 


Lymphocytes (%) (Auto)  12 % (24-48)   


 


Monocytes (%) (Auto)  5 % (0-9)   


 


Eosinophils (%) (Auto)  1 % (0-3)   


 


Basophils (%) (Auto)  1 % (0-3)   


 


Neutrophils # (Auto)


 


 29.1 x10^3/uL


(1.8-7.7) 


 





 


Lymphocytes # (Auto)


 


 4.1 x10^3/uL


(1.0-4.8) 


 





 


Monocytes # (Auto)


 


 1.7 x10^3/uL


(0.0-1.1) 


 





 


Eosinophils # (Auto)


 


 0.4 x10^3/uL


(0.0-0.7) 


 





 


Basophils # (Auto)


 


 0.2 x10^3/uL


(0.0-0.2) 


 





 


Glucose (Fingerstick)


 


 


 194 mg/dL


(70-99) 





 


O2 Saturation    93 % (92-99) 


 


Arterial Blood pH


 


 


 


 7.34


(7.35-7.45)


 


Arterial Blood pCO2 at


Patient Temp 


 


 


 47 mmHg


(35-46)


 


Arterial Blood pO2 at Patient


Temp 


 


 


 73 mmHg


()


 


Arterial Blood HCO3


 


 


 


 25 mmol/L


(21-28)


 


Arterial Blood Base Excess


 


 


 


 -1 mmol/L


(-3-3)


 


FiO2    40 








Laboratory Tests








Test


 3/30/20


14:18 3/30/20


14:30 3/30/20


17:32 3/30/20


23:33


 


Glucose (Fingerstick)


 237 mg/dL


(70-99) 


 161 mg/dL


(70-99) 180 mg/dL


(70-99)


 


Sodium Level


 


 137 mmol/L


(136-145) 


 





 


Potassium Level


 


 4.4 mmol/L


(3.5-5.1) 


 





 


Chloride Level


 


 103 mmol/L


() 


 





 


Carbon Dioxide Level


 


 26 mmol/L


(21-32) 


 





 


Anion Gap  8 (6-14)   


 


Blood Urea Nitrogen


 


 41 mg/dL


(7-20) 


 





 


Creatinine


 


 1.6 mg/dL


(0.6-1.0) 


 





 


Estimated GFR


(Cockcroft-Gault) 


 34.3 


 


 





 


Glucose Level


 


 244 mg/dL


(70-99) 


 





 


Calcium Level


 


 8.1 mg/dL


(8.5-10.1) 


 





 


Phosphorus Level


 


 3.0 mg/dL


(2.6-4.7) 


 





 


Magnesium Level


 


 2.2 mg/dL


(1.8-2.4) 


 





 


Test


 3/31/20


00:30 3/31/20


05:30 3/31/20


05:43 3/31/20


08:00


 


Sodium Level


 137 mmol/L


(136-145) 138 mmol/L


(136-145) 


 





 


Potassium Level


 4.1 mmol/L


(3.5-5.1) 4.1 mmol/L


(3.5-5.1) 


 





 


Chloride Level


 104 mmol/L


() 105 mmol/L


() 


 





 


Carbon Dioxide Level


 28 mmol/L


(21-32) 27 mmol/L


(21-32) 


 





 


Anion Gap 5 (6-14)  6 (6-14)   


 


Blood Urea Nitrogen


 35 mg/dL


(7-20) 34 mg/dL


(7-20) 


 





 


Creatinine


 1.3 mg/dL


(0.6-1.0) 1.3 mg/dL


(0.6-1.0) 


 





 


Estimated GFR


(Cockcroft-Gault) 43.5 


 43.5 


 


 





 


Glucose Level


 193 mg/dL


(70-99) 185 mg/dL


(70-99) 


 





 


Calcium Level


 7.9 mg/dL


(8.5-10.1) 8.1 mg/dL


(8.5-10.1) 


 





 


Phosphorus Level


 2.9 mg/dL


(2.6-4.7) 2.9 mg/dL


(2.6-4.7) 


 





 


Magnesium Level


 2.1 mg/dL


(1.8-2.4) 2.4 mg/dL


(1.8-2.4) 


 





 


White Blood Count


 


 35.5 x10^3/uL


(4.0-11.0) 


 





 


Red Blood Count


 


 1.92 x10^6/uL


(3.50-5.40) 


 





 


Hemoglobin


 


 6.5 g/dL


(12.0-15.5) 


 





 


Hematocrit


 


 19.4 %


(36.0-47.0) 


 





 


Mean Corpuscular Volume


 


 101 fL


() 


 





 


Mean Corpuscular Hemoglobin  34 pg (25-35)   


 


Mean Corpuscular Hemoglobin


Concent 


 33 g/dL


(31-37) 


 





 


Red Cell Distribution Width


 


 15.4 %


(11.5-14.5) 


 





 


Platelet Count


 


 354 x10^3/uL


(140-400) 


 





 


Neutrophils (%) (Auto)  82 % (31-73)   


 


Lymphocytes (%) (Auto)  12 % (24-48)   


 


Monocytes (%) (Auto)  5 % (0-9)   


 


Eosinophils (%) (Auto)  1 % (0-3)   


 


Basophils (%) (Auto)  1 % (0-3)   


 


Neutrophils # (Auto)


 


 29.1 x10^3/uL


(1.8-7.7) 


 





 


Lymphocytes # (Auto)


 


 4.1 x10^3/uL


(1.0-4.8) 


 





 


Monocytes # (Auto)


 


 1.7 x10^3/uL


(0.0-1.1) 


 





 


Eosinophils # (Auto)


 


 0.4 x10^3/uL


(0.0-0.7) 


 





 


Basophils # (Auto)


 


 0.2 x10^3/uL


(0.0-0.2) 


 





 


Glucose (Fingerstick)


 


 


 194 mg/dL


(70-99) 





 


O2 Saturation    93 % (92-99) 


 


Arterial Blood pH


 


 


 


 7.34


(7.35-7.45)


 


Arterial Blood pCO2 at


Patient Temp 


 


 


 47 mmHg


(35-46)


 


Arterial Blood pO2 at Patient


Temp 


 


 


 73 mmHg


()


 


Arterial Blood HCO3


 


 


 


 25 mmol/L


(21-28)


 


Arterial Blood Base Excess


 


 


 


 -1 mmol/L


(-3-3)


 


FiO2    40 








Problem List


Problems


Medical Problems:


(1) Acute pancreatitis


Status: Acute  





(2) Cholelithiasis


Status: Acute  








Assessment/Plan


ct reviewed, will review with Dr Flores, however very poor surgical candidate





GAMAL FLORES MD 3/31/20 1556:


SURGICAL PROGRESS NOTE


Assessment/Plan


Pt seen and examined.


Agree with Ms. Nash's note


Pt intubated and sedated.


abd distended, edematous


reviewing records, appears stable


pulm and kidney function stable


anasarca and ascites noted, suspect some blood


transfuse as needed, although minimize as possible


pt remains poor surgical candidate.


d/w pt's daughter/dpoa on phone.











SAURAV NASH            Mar 31, 2020 10:04


GAMAL FLORES MD             Mar 31, 2020 15:56

## 2020-03-31 NOTE — NUR
SW following. Chart reviewed, pt on TPN, vent, IV abx, CRRT. Per chart, pt is a poor 
surgical candidate and has a poor prognosis. Pt tested negative for Covid-19. SW will 
continue to follow.

## 2020-03-31 NOTE — PDOC
[facility] PROGRESS NOTE


Chief Complaint


Chief Complaint


Respiratory failure requiring mechanical ventilation


Severe Acute gallstone pancreatitis (not a surgical candidate at this time)


Acute kidney failure now requiring dialysis


Salpingo--itis


Gallstones (Calculus of gallbladder with acute cholecystitis without 

obstruction)


HTN 


Leukocytosis 


Hypoxia


Uterine fibroid


Hypoxia with respiratory failure


Intractable pain


Intractable nausea


Possible Covid 19?





History of Present Illness


History of Present Illness


[id number]


Patient seen and examined in the ICU


She is critically ill


Mechanically ventilated


Assist-control/25/450/30 with 8 of PEEP


She is on cefepime daptomycin Flagyl and micafungin GEN for antibiotic coverage


Also getting TPN and CRRT


Sedated with Versed


Getting IV albumin also


She is tachycardic at 112 bpm


Temp max 100.0


White blood count is down from 45,000 35,000 today


Chart reviewed


Discussed with RN











[id number]


Patient seen and examined in the ICU


She remains very critically ill


Going for a CT of the abdomen chest and pelvis


Ventilated : On assist control 40% FiO2


Discussed with RN


Chart reviewed








[id number]


Patient seen and examined in the ICU


She is still on CRRT although we plan to change to hemodialysis soon


Chart reviewed


Discussed with RN


Hemoglobin down to 6.9 (suspect CRRT related , Will let nephrology consider 

transfusion while on dialysis)








[id number]


Patient seen and examined in the ICU


She is mechanically ventilated


Before meals/25/450/30%


Also on CRRT


Very critically ill


Chart reviewed


Discussed with RN











[id number]


Patient seen and examined in the ICU


She is now been transferred to the Covid 19 unit so we can rule out Covid and 

keep her in isolation


Very critically ill


Intubated and  ventilated


Assist control 40%


Chart reviewed


Discussed with RN


Still on CRRT


Getting a chest x-ray now


Very concerned about her prognosis








[id number]


Patient seen and examined in the ICU


She is extremely critically ill


Remains mechanically ventilated with assist control/25/450/40%


Also on continuous renal replacement therapy


Chart reviewed


Discussed with RN


She has TPN hanging


Also on pressors











[id number]


Patient seen and examined in the ICU


She is still requiring CRRT


On the vent with assist control but her FiO2 is down to 40% from yesterday


Slightly better but still very critically ill


Discussed with RN


Chart reviewed








[id number]


Patient seen and examined in the ICU


She remains extremely critically ill


On IV fentanyl IV Versed IV Doxy


On the vent


Assist-control/25/450/70%


She is critically ill


Discussed with RN


Chart reviewed


Patient is currently on CRRT as well








[id number]


Patient seen and examined in the ICU


She had to be intubated this morning


On assist-control 25/450/100% with 10 of PEEP and only satting 87%


She is extremely critically ill


I'm not sure if she will survive


Chart reviewed


Discussed with RN











Ms Tadeo is a 48yo F w/ PMHx HTN, prediabetes who presents the emergency room

complaints of abdominal pain. Patient described off and on 3 days. She states is

constant, described as a squeezing sensation in a band-like distribution. + 

nausea, vomiting.  She denies any fever or diarrhea.  Patient denies any 

abdominal surgical procedures.  She states is worse with movements, car ride.  

Pain initially was upper abdomen however now pretty much generalized.  Last 

bowel movement was 3/15/2020. Nothing makes her pain better.  Patient denies any

shortness of breath.  She does state the pain moves into her chest.  Denies any 

headache or visual changes.


Lipase 86526, , , Bilirubin 1.4.


CT abdomen confirms pancreatic inflammation, peripancreatic fluid and 

inflammatory changes around the pancreas consistent with pancreatitis. 

Cholelithiasis and 1.4cm uterine fibroid as well as possible left salpingitis. 

Admitted for further care


GI, General surgery, ID, Pulm consulted.





3/17: Overnight per report no urine output. Added dilaudid for pain, PICC placed

per IR. Renal US negative.Seen bedside in ICU, given 2L additional NSS and a

lbumin infusion. Still hypotensive, started on levophed. Repeat CT abdomen with 

necrosis. Updated her fiancee


3/18: Sats are only 87% on nasal cannula oxygen. Dialysis catheter per 

nephrology


3/19: She is now on BiPAP appears more ill, now on dialysis


3/20: Seen on BiPAP. Her mother and another family member are present and seemed

to be good support for her. Currently on dialysis. Appears critically ill


3/21: Overnight Tmax 101.7 , still on BiPAP FiO2 40%, still on low dose Levophed

gtt, TPN initiated. On dialysis





Overnight still febrile. Dialysis today. She wakes up and responds to pain. No 

CP.





Vitals/I&O


Vitals/I&O:





                                   Vital Signs








  Date Time  Temp Pulse Resp B/P (MAP) Pulse Ox O2 Delivery O2 Flow Rate FiO2


 


3/31/20 12:12     100 Ventilator  


 


3/31/20 11:40       6.0 


 


3/31/20 11:00  113 28 114/66 (82)    


 


3/31/20 09:55 99.0       





 99.0       














                                    I & O   


 


 3/30/20 3/30/20 3/31/20





 15:00 23:00 07:00


 


Intake Total 700 ml 831 ml 1120 ml


 


Output Total 75 ml 315 ml 150 ml


 


Balance 625 ml 516 ml 970 ml











Physical Exam


Physical Exam:


GENERAL: Orally intubated/sedated - generalizd anasarca 


HEENT: Mild icterus, pupils equal, ETT, NGT


NECK:  Supple. 


LUNGS:  Decreased breath sounds at the bases.


HEART:  S1, S2, regular 


ABDOMEN:  Distended, hypoactive BS, Rectal tube in place 


: [place]   


EXTREMITIES: Generalized edema, no cyanosis - some mottling, [place] boots 

bilaterally 


DERMATOLOGIC:  Warm and dry.  No generalized rash.  


CENTRAL NERVOUS SYSTEM: Sedated 


RIJ Temp HDC, RUE-PICC & LIJ - clean


General:  Other (sedated )


Heart:  Other (increased rate)


Abdomen:  Other (distended, firm)


Extremities:  No edema, Other (SOME CLUBBING )


Skin:  Other (mottling noted to extremities )





Labs


Labs:





Laboratory Tests








Test


 3/30/20


14:18 3/30/20


14:30 3/30/20


17:32 3/30/20


23:33


 


Glucose (Fingerstick)


 237 mg/dL


(70-99) 


 161 mg/dL


(70-99) 180 mg/dL


(70-99)


 


Sodium Level


 


 137 mmol/L


(136-145) 


 





 


Potassium Level


 


 4.4 mmol/L


(3.5-5.1) 


 





 


Chloride Level


 


 103 mmol/L


() 


 





 


Carbon Dioxide Level


 


 26 mmol/L


(21-32) 


 





 


Anion Gap  8 (6-14)   


 


Blood Urea Nitrogen


 


 41 mg/dL


(7-20) 


 





 


Creatinine


 


 1.6 mg/dL


(0.6-1.0) 


 





 


Estimated GFR


(Cockcroft-Gault) 


 34.3 


 


 





 


Glucose Level


 


 244 mg/dL


(70-99) 


 





 


Calcium Level


 


 8.1 mg/dL


(8.5-10.1) 


 





 


Phosphorus Level


 


 3.0 mg/dL


(2.6-4.7) 


 





 


Magnesium Level


 


 2.2 mg/dL


(1.8-2.4) 


 





 


Test


 3/31/20


00:30 3/31/20


05:30 3/31/20


05:43 3/31/20


08:00


 


Sodium Level


 137 mmol/L


(136-145) 138 mmol/L


(136-145) 


 





 


Potassium Level


 4.1 mmol/L


(3.5-5.1) 4.1 mmol/L


(3.5-5.1) 


 





 


Chloride Level


 104 mmol/L


() 105 mmol/L


() 


 





 


Carbon Dioxide Level


 28 mmol/L


(21-32) 27 mmol/L


(21-32) 


 





 


Anion Gap 5 (6-14)  6 (6-14)   


 


Blood Urea Nitrogen


 35 mg/dL


(7-20) 34 mg/dL


(7-20) 


 





 


Creatinine


 1.3 mg/dL


(0.6-1.0) 1.3 mg/dL


(0.6-1.0) 


 





 


Estimated GFR


(Cockcroft-Gault) 43.5 


 43.5 


 


 





 


Glucose Level


 193 mg/dL


(70-99) 185 mg/dL


(70-99) 


 





 


Calcium Level


 7.9 mg/dL


(8.5-10.1) 8.1 mg/dL


(8.5-10.1) 


 





 


Phosphorus Level


 2.9 mg/dL


(2.6-4.7) 2.9 mg/dL


(2.6-4.7) 


 





 


Magnesium Level


 2.1 mg/dL


(1.8-2.4) 2.4 mg/dL


(1.8-2.4) 


 





 


White Blood Count


 


 35.5 x10^3/uL


(4.0-11.0) 


 





 


Red Blood Count


 


 1.92 x10^6/uL


(3.50-5.40) 


 





 


Hemoglobin


 


 6.5 g/dL


(12.0-15.5) 


 





 


Hematocrit


 


 19.4 %


(36.0-47.0) 


 





 


Mean Corpuscular Volume


 


 101 fL


() 


 





 


Mean Corpuscular Hemoglobin  34 pg (25-35)   


 


Mean Corpuscular Hemoglobin


Concent 


 33 g/dL


(31-37) 


 





 


Red Cell Distribution Width


 


 15.4 %


(11.5-14.5) 


 





 


Platelet Count


 


 354 x10^3/uL


(140-400) 


 





 


Neutrophils (%) (Auto)  82 % (31-73)   


 


Lymphocytes (%) (Auto)  12 % (24-48)   


 


Monocytes (%) (Auto)  5 % (0-9)   


 


Eosinophils (%) (Auto)  1 % (0-3)   


 


Basophils (%) (Auto)  1 % (0-3)   


 


Neutrophils # (Auto)


 


 29.1 x10^3/uL


(1.8-7.7) 


 





 


Lymphocytes # (Auto)


 


 4.1 x10^3/uL


(1.0-4.8) 


 





 


Monocytes # (Auto)


 


 1.7 x10^3/uL


(0.0-1.1) 


 





 


Eosinophils # (Auto)


 


 0.4 x10^3/uL


(0.0-0.7) 


 





 


Basophils # (Auto)


 


 0.2 x10^3/uL


(0.0-0.2) 


 





 


Glucose (Fingerstick)


 


 


 194 mg/dL


(70-99) 





 


O2 Saturation    93 % (92-99) 


 


Arterial Blood pH


 


 


 


 7.34


(7.35-7.45)


 


Arterial Blood pCO2 at


Patient Temp 


 


 


 47 mmHg


(35-46)


 


Arterial Blood pO2 at Patient


Temp 


 


 


 73 mmHg


()


 


Arterial Blood HCO3


 


 


 


 25 mmol/L


(21-28)


 


Arterial Blood Base Excess


 


 


 


 -1 mmol/L


(-3-3)


 


FiO2    40 


 


Test


 3/31/20


12:00 


 


 





 


Prothrombin Time


 21.3 SEC


(11.7-14.0) 


 


 





 


Prothromb Time International


Ratio 1.9 (0.8-1.1) 


 


 


 





 


Sodium Level


 138 mmol/L


(136-145) 


 


 





 


Potassium Level


 3.9 mmol/L


(3.5-5.1) 


 


 





 


Chloride Level


 103 mmol/L


() 


 


 





 


Carbon Dioxide Level


 27 mmol/L


(21-32) 


 


 





 


Anion Gap 8 (6-14)    


 


Blood Urea Nitrogen


 33 mg/dL


(7-20) 


 


 





 


Creatinine


 1.2 mg/dL


(0.6-1.0) 


 


 





 


Estimated GFR


(Cockcroft-Gault) 47.7 


 


 


 





 


Glucose Level


 192 mg/dL


(70-99) 


 


 





 


Calcium Level


 8.0 mg/dL


(8.5-10.1) 


 


 





 


Phosphorus Level


 2.7 mg/dL


(2.6-4.7) 


 


 





 


Magnesium Level


 2.2 mg/dL


(1.8-2.4) 


 


 














Review of Systems


Review of Systems:


Unable to obtain





Assessment and Plan


Assessmemt and Plan


Problems


Medical Problems:


(1) Acute pancreatitis


Status: Acute  





(2) Cholelithiasis


Status: Acute  





Respiratory failure requiring intubation


Severe Acute gallstone pancreatitis (she is not a surgical candidate as she is 

too ill)


Acute kidney failure now requiring dialysis


Salpingo--itis


Gallstones (Calculus of gallbladder with acute cholecystitis without 

obstruction)


HTN 


Leukocytosis 


Hypoxia


Uterine fibroid


Hypoxia with respiratory failure


Intractable pain


Intractable nausea











Plan


I calculated [place]'s criteria with RN patient scores a 9. (This has 100% 

mortality according to [place]'s criteria)


For now:


CRRT but we are changing to hemodialysis


Mechanical ventilation


ICU monitoring


When necessary transfusions


Trend labs especially white count and lipase levels


We have consulted GI and general surgery


Appreciate pulmonary assistance as well


IV antibiotics


IV fluids


ME narcotics


When necessary anti-medics


Home meds if possible


DVT prophylaxis


Full code


She is critically ill


Total time 39 minutes





Comment


Review of Relevant


I have reviewed the following items josy (where applicable) has been applied.


Medications:





Current Medications








 Medications


  (Trade)  Dose


 Ordered  Sig/Yee


 Route


 PRN Reason  Start Time


 Stop Time Status Last Admin


Dose Admin


 


 Sodium Chloride


 90 meq/Potassium


 Chloride 15 meq/


 Potassium


 Phosphate 18 mmol/


 Magnesium Sulfate


 8 meq/Calcium


 Gluconate 15 meq/


 Multivitamins 10


 ml/Chromium/


 Copper/Manganese/


 Seleni/Zn 0.5 ml/


 Insulin Human


 Regular 15 unit/


 Total Parenteral


 Nutrition/Amino


 Acids/Dextrose/


 Fat Emulsion


 Intravenous  1,400 ml @ 


 58.333 mls/


 hr  TPN  CONT


 IV


   3/30/20 22:00


 3/31/20 21:59  3/30/20 21:47














Hemodynamically unstable?:  Yes


Is patient in severe pain?:  No


Is NPO status required?:  Yes











HECTOR MASON III DO           Mar 31, 2020 13:44

## 2020-03-31 NOTE — NUR
Pharmacy TPN Dosing Note



S: JESENIA RICH is a 49 year old F Currently receiving Central Continuous TPN started 
03/18/20



B:Pertinent PMH: Necrotizing pancreatitis

Height: 5 feet, 8 inches

Weight: 108.5 kg



Current diet: NPO 



LABS:

Sodium:    138 

Potassium: 4.1 

Chloride:  105 

Calcium:   8.1 

Corrected Calcium: 9.78 

Magnesium: 2.4 

CO2:       27 

SCr:       1.3 

Glucose:   180-194 

Albumin:   1.9 

AST:       37 

ALT:       16 



TPN FORMULA:

TPN TYPE:  Central Continuous

AMINO ACIDS:         125 gm

DEXTROSE:            195 gm

LIPIDS:              40 gm

SODIUM CHLORIDE:     90 mEq

POTASSIUM CHLORIDE:  15 mEq

POTASSIUM PHOSPHATE: 18 mmol

MAGNESIUM:           8 mEq

CALCIUM:             15 mEq

INSULIN:             20 units

MULTIPLE VITAMIN:    10 ml

TRACE ELEMENTS:      0.5 ml(s)



TPN PLAN:  

BG still slightly elevated. Will increase insulin in TPN from 15 units to

20 units. All other electrolytes WNL.

Labs per nephrology for CRRT.





R: Change TPN as noted above.

Will monitor electrolytes, glucose, and tolerance to TPN.



 LORENZO LESLIE [facility], 03/31/20 1323

## 2020-03-31 NOTE — PDOC
Objective:


Vital Signs:





                                   Vital Signs








  Date Time  Temp Pulse Resp B/P (MAP) Pulse Ox O2 Delivery O2 Flow Rate FiO2


 


3/31/20 08:58 99.9 115 26 110/60    





 99.9       


 


3/31/20 08:27     98 Ventilator  


 


3/30/20 15:16       6.0 








Labs:





Laboratory Tests








Test


 3/30/20


14:18 3/30/20


14:30 3/30/20


17:32 3/30/20


23:33


 


Glucose (Fingerstick) 237 mg/dL   161 mg/dL  180 mg/dL 


 


Sodium Level  137 mmol/L   


 


Potassium Level  4.4 mmol/L   


 


Chloride Level  103 mmol/L   


 


Carbon Dioxide Level  26 mmol/L   


 


Anion Gap  8   


 


Blood Urea Nitrogen  41 mg/dL   


 


Creatinine  1.6 mg/dL   


 


Estimated GFR


(Cockcroft-Gault) 


 34.3 


 


 





 


Glucose Level  244 mg/dL   


 


Calcium Level  8.1 mg/dL   


 


Phosphorus Level  3.0 mg/dL   


 


Magnesium Level  2.2 mg/dL   


 


Test


 3/31/20


00:30 3/31/20


05:30 3/31/20


05:43 





 


Sodium Level 137 mmol/L  138 mmol/L   


 


Potassium Level 4.1 mmol/L  4.1 mmol/L   


 


Chloride Level 104 mmol/L  105 mmol/L   


 


Carbon Dioxide Level 28 mmol/L  27 mmol/L   


 


Anion Gap 5  6   


 


Blood Urea Nitrogen 35 mg/dL  34 mg/dL   


 


Creatinine 1.3 mg/dL  1.3 mg/dL   


 


Estimated GFR


(Cockcroft-Gault) 43.5 


 43.5 


 


 





 


Glucose Level 193 mg/dL  185 mg/dL   


 


Calcium Level 7.9 mg/dL  8.1 mg/dL   


 


Phosphorus Level 2.9 mg/dL  2.9 mg/dL   


 


Magnesium Level 2.1 mg/dL  2.4 mg/dL   


 


White Blood Count  35.5 x10^3/uL   


 


Red Blood Count  1.92 x10^6/uL   


 


Hemoglobin  6.5 g/dL   


 


Hematocrit  19.4 %   


 


Mean Corpuscular Volume  101 fL   


 


Mean Corpuscular Hemoglobin  34 pg   


 


Mean Corpuscular Hemoglobin


Concent 


 33 g/dL 


 


 





 


Red Cell Distribution Width  15.4 %   


 


Platelet Count  354 x10^3/uL   


 


Neutrophils (%) (Auto)  82 %   


 


Lymphocytes (%) (Auto)  12 %   


 


Monocytes (%) (Auto)  5 %   


 


Eosinophils (%) (Auto)  1 %   


 


Basophils (%) (Auto)  1 %   


 


Neutrophils # (Auto)  29.1 x10^3/uL   


 


Lymphocytes # (Auto)  4.1 x10^3/uL   


 


Monocytes # (Auto)  1.7 x10^3/uL   


 


Eosinophils # (Auto)  0.4 x10^3/uL   


 


Basophils # (Auto)  0.2 x10^3/uL   


 


Glucose (Fingerstick)   194 mg/dL  








Imaging:


C/A/P CT 3/30


IMPRESSION:


Acute pancreatitis with developing anasarca, including worsening mesenteric 

edema and developing hemorrhagic ascites. Multiple supportive lines and tubes 

have been placed in the interval, consistent with a critically acute illness.





CXR 3/31


pending





PE:





GEN: intubated/vent, CRRT, TPN


LUNGS: diminished anteriorly, tachypneic


HEART: tachycardic


ABD: firm, particularly upper abdomen, rectal tube


NEURO/PSYCH: sedated





A/P:


Gallstone pancreatitis, MOSF


Anemia - transfusion planned





--


Interval CT as above - d/w Dr. Davis, reviewed w/ Dr. Bhatia - would not tap 

ascites, await surgical follow-up.





Hemodynamically unstable?:  Yes


Is patient in severe pain?:  No


Is NPO status required?:  Yes











RAGHAVENDRA MURCIA         Mar 31, 2020 09:30

## 2020-04-01 VITALS — DIASTOLIC BLOOD PRESSURE: 65 MMHG | SYSTOLIC BLOOD PRESSURE: 103 MMHG

## 2020-04-01 VITALS — SYSTOLIC BLOOD PRESSURE: 93 MMHG | DIASTOLIC BLOOD PRESSURE: 67 MMHG

## 2020-04-01 VITALS — DIASTOLIC BLOOD PRESSURE: 67 MMHG | SYSTOLIC BLOOD PRESSURE: 107 MMHG

## 2020-04-01 VITALS — DIASTOLIC BLOOD PRESSURE: 81 MMHG | SYSTOLIC BLOOD PRESSURE: 115 MMHG

## 2020-04-01 VITALS — DIASTOLIC BLOOD PRESSURE: 59 MMHG | SYSTOLIC BLOOD PRESSURE: 107 MMHG

## 2020-04-01 VITALS — SYSTOLIC BLOOD PRESSURE: 98 MMHG | DIASTOLIC BLOOD PRESSURE: 58 MMHG

## 2020-04-01 VITALS — DIASTOLIC BLOOD PRESSURE: 59 MMHG | SYSTOLIC BLOOD PRESSURE: 100 MMHG

## 2020-04-01 VITALS — DIASTOLIC BLOOD PRESSURE: 62 MMHG | SYSTOLIC BLOOD PRESSURE: 106 MMHG

## 2020-04-01 VITALS — SYSTOLIC BLOOD PRESSURE: 107 MMHG | DIASTOLIC BLOOD PRESSURE: 63 MMHG

## 2020-04-01 VITALS — DIASTOLIC BLOOD PRESSURE: 63 MMHG | SYSTOLIC BLOOD PRESSURE: 104 MMHG

## 2020-04-01 VITALS — DIASTOLIC BLOOD PRESSURE: 65 MMHG | SYSTOLIC BLOOD PRESSURE: 97 MMHG

## 2020-04-01 VITALS — SYSTOLIC BLOOD PRESSURE: 116 MMHG | DIASTOLIC BLOOD PRESSURE: 63 MMHG

## 2020-04-01 VITALS — DIASTOLIC BLOOD PRESSURE: 78 MMHG | SYSTOLIC BLOOD PRESSURE: 102 MMHG

## 2020-04-01 VITALS — DIASTOLIC BLOOD PRESSURE: 57 MMHG | SYSTOLIC BLOOD PRESSURE: 124 MMHG

## 2020-04-01 VITALS — SYSTOLIC BLOOD PRESSURE: 106 MMHG | DIASTOLIC BLOOD PRESSURE: 64 MMHG

## 2020-04-01 VITALS — SYSTOLIC BLOOD PRESSURE: 119 MMHG | DIASTOLIC BLOOD PRESSURE: 50 MMHG

## 2020-04-01 VITALS — SYSTOLIC BLOOD PRESSURE: 118 MMHG | DIASTOLIC BLOOD PRESSURE: 57 MMHG

## 2020-04-01 VITALS — DIASTOLIC BLOOD PRESSURE: 63 MMHG | SYSTOLIC BLOOD PRESSURE: 121 MMHG

## 2020-04-01 VITALS — DIASTOLIC BLOOD PRESSURE: 65 MMHG | SYSTOLIC BLOOD PRESSURE: 93 MMHG

## 2020-04-01 VITALS — DIASTOLIC BLOOD PRESSURE: 67 MMHG | SYSTOLIC BLOOD PRESSURE: 115 MMHG

## 2020-04-01 VITALS — SYSTOLIC BLOOD PRESSURE: 129 MMHG | DIASTOLIC BLOOD PRESSURE: 72 MMHG

## 2020-04-01 VITALS — DIASTOLIC BLOOD PRESSURE: 76 MMHG | SYSTOLIC BLOOD PRESSURE: 105 MMHG

## 2020-04-01 VITALS — DIASTOLIC BLOOD PRESSURE: 69 MMHG | SYSTOLIC BLOOD PRESSURE: 122 MMHG

## 2020-04-01 LAB
ALBUMIN SERPL-MCNC: 2.4 G/DL (ref 3.4–5)
ALBUMIN/GLOB SERPL: 0.9 {RATIO} (ref 1–1.7)
ALP SERPL-CCNC: 119 U/L (ref 46–116)
ALT SERPL-CCNC: 20 U/L (ref 14–59)
ANION GAP SERPL CALC-SCNC: 4 MMOL/L (ref 6–14)
ANION GAP SERPL CALC-SCNC: 5 MMOL/L (ref 6–14)
ANION GAP SERPL CALC-SCNC: 6 MMOL/L (ref 6–14)
ANION GAP SERPL CALC-SCNC: 6 MMOL/L (ref 6–14)
AST SERPL-CCNC: 46 U/L (ref 15–37)
BASE EXCESS ABG: -4 MMOL/L (ref -3–3)
BASOPHILS # BLD AUTO: 0.1 X10^3/UL (ref 0–0.2)
BASOPHILS # BLD AUTO: 0.2 X10^3/UL (ref 0–0.2)
BASOPHILS NFR BLD: 1 % (ref 0–3)
BASOPHILS NFR BLD: 1 % (ref 0–3)
BILIRUB SERPL-MCNC: 1 MG/DL (ref 0.2–1)
BUN SERPL-MCNC: 30 MG/DL (ref 7–20)
BUN SERPL-MCNC: 31 MG/DL (ref 7–20)
BUN SERPL-MCNC: 31 MG/DL (ref 7–20)
BUN SERPL-MCNC: 32 MG/DL (ref 7–20)
BUN/CREAT SERPL: 28 (ref 6–20)
CALCIUM SERPL-MCNC: 7.2 MG/DL (ref 8.5–10.1)
CALCIUM SERPL-MCNC: 8 MG/DL (ref 8.5–10.1)
CALCIUM SERPL-MCNC: 8.2 MG/DL (ref 8.5–10.1)
CALCIUM SERPL-MCNC: 8.2 MG/DL (ref 8.5–10.1)
CHLORIDE SERPL-SCNC: 103 MMOL/L (ref 98–107)
CHLORIDE SERPL-SCNC: 104 MMOL/L (ref 98–107)
CO2 SERPL-SCNC: 28 MMOL/L (ref 21–32)
CO2 SERPL-SCNC: 29 MMOL/L (ref 21–32)
CREAT SERPL-MCNC: 1.1 MG/DL (ref 0.6–1)
EOSINOPHIL NFR BLD: 0.5 X10^3/UL (ref 0–0.7)
EOSINOPHIL NFR BLD: 0.7 X10^3/UL (ref 0–0.7)
EOSINOPHIL NFR BLD: 2 % (ref 0–3)
EOSINOPHIL NFR BLD: 2 % (ref 0–3)
ERYTHROCYTE [DISTWIDTH] IN BLOOD BY AUTOMATED COUNT: 16.8 % (ref 11.5–14.5)
ERYTHROCYTE [DISTWIDTH] IN BLOOD BY AUTOMATED COUNT: 17.1 % (ref 11.5–14.5)
GFR SERPLBLD BASED ON 1.73 SQ M-ARVRAT: 52.8 ML/MIN
GLOBULIN SER-MCNC: 2.8 G/DL (ref 2.2–3.8)
GLUCOSE SERPL-MCNC: 165 MG/DL (ref 70–99)
GLUCOSE SERPL-MCNC: 170 MG/DL (ref 70–99)
GLUCOSE SERPL-MCNC: 177 MG/DL (ref 70–99)
GLUCOSE SERPL-MCNC: 181 MG/DL (ref 70–99)
HCO3 BLDA-SCNC: 22 MMOL/L (ref 21–28)
HCT VFR BLD CALC: 26.9 % (ref 36–47)
HCT VFR BLD CALC: 27.5 % (ref 36–47)
HGB BLD-MCNC: 8.9 G/DL (ref 12–15.5)
HGB BLD-MCNC: 9.1 G/DL (ref 12–15.5)
INSPIRATION SETTING TIME VENT: (no result)
LYMPHOCYTES # BLD: 2.2 X10^3/UL (ref 1–4.8)
LYMPHOCYTES # BLD: 3 X10^3/UL (ref 1–4.8)
LYMPHOCYTES NFR BLD AUTO: 10 % (ref 24–48)
LYMPHOCYTES NFR BLD AUTO: 10 % (ref 24–48)
MAGNESIUM SERPL-MCNC: 2 MG/DL (ref 1.8–2.4)
MAGNESIUM SERPL-MCNC: 2.1 MG/DL (ref 1.8–2.4)
MAGNESIUM SERPL-MCNC: 2.3 MG/DL (ref 1.8–2.4)
MAGNESIUM SERPL-MCNC: 2.3 MG/DL (ref 1.8–2.4)
MCH RBC QN AUTO: 32 PG (ref 25–35)
MCH RBC QN AUTO: 32 PG (ref 25–35)
MCHC RBC AUTO-ENTMCNC: 33 G/DL (ref 31–37)
MCHC RBC AUTO-ENTMCNC: 33 G/DL (ref 31–37)
MCV RBC AUTO: 98 FL (ref 79–100)
MCV RBC AUTO: 98 FL (ref 79–100)
MONO #: 1.3 X10^3/UL (ref 0–1.1)
MONO #: 1.7 X10^3/UL (ref 0–1.1)
MONOCYTES NFR BLD: 6 % (ref 0–9)
MONOCYTES NFR BLD: 6 % (ref 0–9)
NEUT #: 18.5 X10^3/UL (ref 1.8–7.7)
NEUT #: 24.4 X10^3/UL (ref 1.8–7.7)
NEUTROPHILS NFR BLD AUTO: 81 % (ref 31–73)
NEUTROPHILS NFR BLD AUTO: 82 % (ref 31–73)
PCO2 BLDA: 41 MMHG (ref 35–46)
PHOSPHATE SERPL-MCNC: 2.9 MG/DL (ref 2.6–4.7)
PLATELET # BLD AUTO: 257 X10^3/UL (ref 140–400)
PLATELET # BLD AUTO: 281 X10^3/UL (ref 140–400)
PO2 BLDA: 83 MMHG (ref 75–108)
POTASSIUM SERPL-SCNC: 3.7 MMOL/L (ref 3.5–5.1)
POTASSIUM SERPL-SCNC: 3.9 MMOL/L (ref 3.5–5.1)
POTASSIUM SERPL-SCNC: 4 MMOL/L (ref 3.5–5.1)
POTASSIUM SERPL-SCNC: 4.1 MMOL/L (ref 3.5–5.1)
PROT SERPL-MCNC: 5.2 G/DL (ref 6.4–8.2)
RBC # BLD AUTO: 2.75 X10^6/UL (ref 3.5–5.4)
RBC # BLD AUTO: 2.81 X10^6/UL (ref 3.5–5.4)
SAO2 % BLDA: 95 % (ref 92–99)
SODIUM SERPL-SCNC: 137 MMOL/L (ref 136–145)
SODIUM SERPL-SCNC: 138 MMOL/L (ref 136–145)
WBC # BLD AUTO: 22.6 X10^3/UL (ref 4–11)
WBC # BLD AUTO: 30.1 X10^3/UL (ref 4–11)

## 2020-04-01 RX ADMIN — PANTOPRAZOLE SODIUM SCH MG: 40 INJECTION, POWDER, FOR SOLUTION INTRAVENOUS at 08:15

## 2020-04-01 RX ADMIN — POTASSIUM CHLORIDE SCH MLS/HR: 2 INJECTION, SOLUTION, CONCENTRATE INTRAVENOUS at 23:15

## 2020-04-01 RX ADMIN — CEFEPIME SCH GM: 2 INJECTION, POWDER, FOR SOLUTION INTRAVENOUS at 20:53

## 2020-04-01 RX ADMIN — AMIODARONE HYDROCHLORIDE PRN MLS/HR: 50 INJECTION, SOLUTION INTRAVENOUS at 23:50

## 2020-04-01 RX ADMIN — POTASSIUM CHLORIDE SCH MLS/HR: 2 INJECTION, SOLUTION, CONCENTRATE INTRAVENOUS at 03:29

## 2020-04-01 RX ADMIN — AMIODARONE HYDROCHLORIDE PRN MLS/HR: 50 INJECTION, SOLUTION INTRAVENOUS at 05:56

## 2020-04-01 RX ADMIN — CEFEPIME SCH GM: 2 INJECTION, POWDER, FOR SOLUTION INTRAVENOUS at 08:30

## 2020-04-01 RX ADMIN — INSULIN LISPRO SCH UNITS: 100 INJECTION, SOLUTION INTRAVENOUS; SUBCUTANEOUS at 05:46

## 2020-04-01 RX ADMIN — INSULIN LISPRO SCH UNITS: 100 INJECTION, SOLUTION INTRAVENOUS; SUBCUTANEOUS at 17:18

## 2020-04-01 RX ADMIN — INSULIN LISPRO SCH UNITS: 100 INJECTION, SOLUTION INTRAVENOUS; SUBCUTANEOUS at 00:16

## 2020-04-01 RX ADMIN — MIDAZOLAM HYDROCHLORIDE PRN MLS/HR: 5 INJECTION, SOLUTION INTRAMUSCULAR; INTRAVENOUS at 13:57

## 2020-04-01 RX ADMIN — INSULIN LISPRO SCH UNITS: 100 INJECTION, SOLUTION INTRAVENOUS; SUBCUTANEOUS at 11:32

## 2020-04-01 RX ADMIN — Medication PRN EACH: at 12:24

## 2020-04-01 RX ADMIN — ALBUMIN (HUMAN) PRN MLS/HR: 12.5 INJECTION, SOLUTION INTRAVENOUS at 14:21

## 2020-04-01 RX ADMIN — DEXTROSE SCH MLS/HR: 50 INJECTION, SOLUTION INTRAVENOUS at 08:30

## 2020-04-01 RX ADMIN — Medication PRN EACH: at 13:02

## 2020-04-01 RX ADMIN — POTASSIUM CHLORIDE SCH MLS/HR: 2 INJECTION, SOLUTION, CONCENTRATE INTRAVENOUS at 03:28

## 2020-04-01 RX ADMIN — POTASSIUM CHLORIDE SCH MLS/HR: 2 INJECTION, SOLUTION, CONCENTRATE INTRAVENOUS at 18:14

## 2020-04-01 RX ADMIN — POTASSIUM CHLORIDE SCH MLS/HR: 2 INJECTION, SOLUTION, CONCENTRATE INTRAVENOUS at 13:58

## 2020-04-01 RX ADMIN — POTASSIUM CHLORIDE SCH MLS/HR: 2 INJECTION, SOLUTION, CONCENTRATE INTRAVENOUS at 07:54

## 2020-04-01 NOTE — PDOC
Renal-Progress Notes


Subjective Notes


Notes


NOTHING NEW





History of Present Illness


Hx of present illness


REMAINS CRITICALLY ILL





Vitals


Vitals





Vital Signs








  Date Time  Temp Pulse Resp B/P (MAP) Pulse Ox O2 Delivery O2 Flow Rate FiO2


 


4/1/20 11:00  109 25 124/57 (79) 100 Ventilator  


 


4/1/20 08:00 98.4       





 98.4       


 


3/31/20 12:10       6.0 








Weight


Weight [ ]





I.O.


Intake and Output











Intake and Output 


 


 4/1/20





 07:00


 


Intake Total 3278 ml


 


Output Total 555 ml


 


Balance 2723 ml


 


 


 


IV Total 2598 ml


 


Blood Product 630 ml


 


Blood Product IV Normal Saline Flush 50 ml


 


Output Urine Total 430 ml


 


Gastric Drainage Total 125 ml











Labs


Labs





Laboratory Tests








Test


 3/31/20


12:00 3/31/20


17:30 3/31/20


17:40 4/1/20


00:10


 


Prothrombin Time


 21.3 SEC


(11.7-14.0) 


 


 





 


Prothromb Time International


Ratio 1.9 (0.8-1.1) 


 


 


 





 


Sodium Level


 138 mmol/L


(136-145) 


 138 mmol/L


(136-145) 





 


Potassium Level


 3.9 mmol/L


(3.5-5.1) 


 4.1 mmol/L


(3.5-5.1) 





 


Chloride Level


 103 mmol/L


() 


 104 mmol/L


() 





 


Carbon Dioxide Level


 27 mmol/L


(21-32) 


 28 mmol/L


(21-32) 





 


Anion Gap 8 (6-14)   6 (6-14)  


 


Blood Urea Nitrogen


 33 mg/dL


(7-20) 


 33 mg/dL


(7-20) 





 


Creatinine


 1.2 mg/dL


(0.6-1.0) 


 1.2 mg/dL


(0.6-1.0) 





 


Estimated GFR


(Cockcroft-Gault) 47.7 


 


 47.7 


 





 


Glucose Level


 192 mg/dL


(70-99) 


 185 mg/dL


(70-99) 





 


Calcium Level


 8.0 mg/dL


(8.5-10.1) 


 8.1 mg/dL


(8.5-10.1) 





 


Phosphorus Level


 2.7 mg/dL


(2.6-4.7) 


 3.1 mg/dL


(2.6-4.7) 





 


Magnesium Level


 2.2 mg/dL


(1.8-2.4) 


 2.3 mg/dL


(1.8-2.4) 





 


White Blood Count


 


 37.4 x10^3/uL


(4.0-11.0) 


 





 


Red Blood Count


 


 2.89 x10^6/uL


(3.50-5.40) 


 





 


Hemoglobin


 


 9.3 g/dL


(12.0-15.5) 


 





 


Hematocrit


 


 28.0 %


(36.0-47.0) 


 





 


Mean Corpuscular Volume  97 fL ()   


 


Mean Corpuscular Hemoglobin  32 pg (25-35)   


 


Mean Corpuscular Hemoglobin


Concent 


 33 g/dL


(31-37) 


 





 


Red Cell Distribution Width


 


 17.1 %


(11.5-14.5) 


 





 


Platelet Count


 


 307 x10^3/uL


(140-400) 


 





 


Neutrophils (%) (Auto)  86 % (31-73)   


 


Lymphocytes (%) (Auto)  6 % (24-48)   


 


Monocytes (%) (Auto)  5 % (0-9)   


 


Eosinophils (%) (Auto)  2 % (0-3)   


 


Basophils (%) (Auto)  1 % (0-3)   


 


Neutrophils # (Auto)


 


 32.1 x10^3/uL


(1.8-7.7) 


 





 


Lymphocytes # (Auto)


 


 2.4 x10^3/uL


(1.0-4.8) 


 





 


Monocytes # (Auto)


 


 1.9 x10^3/uL


(0.0-1.1) 


 





 


Eosinophils # (Auto)


 


 0.8 x10^3/uL


(0.0-0.7) 


 





 


Basophils # (Auto)


 


 0.2 x10^3/uL


(0.0-0.2) 


 





 


BUN/Creatinine Ratio   28 (6-20)  


 


Total Bilirubin


 


 


 1.0 mg/dL


(0.2-1.0) 





 


Aspartate Amino Transf


(AST/SGOT) 


 


 50 U/L (15-37) 


 





 


Alanine Aminotransferase


(ALT/SGPT) 


 


 21 U/L (14-59) 


 





 


Alkaline Phosphatase


 


 


 128 U/L


() 





 


Total Protein


 


 


 5.3 g/dL


(6.4-8.2) 





 


Albumin


 


 


 2.5 g/dL


(3.4-5.0) 





 


Albumin/Globulin Ratio   0.9 (1.0-1.7)  


 


Glucose (Fingerstick)


 


 


 


 170 mg/dL


(70-99)


 


Test


 4/1/20


00:15 4/1/20


05:30 4/1/20


05:37 4/1/20


08:00


 


Sodium Level


 137 mmol/L


(136-145) 138 mmol/L


(136-145) 


 





 


Potassium Level


 4.1 mmol/L


(3.5-5.1) 4.0 mmol/L


(3.5-5.1) 


 





 


Chloride Level


 103 mmol/L


() 104 mmol/L


() 


 





 


Carbon Dioxide Level


 28 mmol/L


(21-32) 28 mmol/L


(21-32) 


 





 


Anion Gap 6 (6-14)  6 (6-14)   


 


Blood Urea Nitrogen


 32 mg/dL


(7-20) 31 mg/dL


(7-20) 


 





 


Creatinine


 1.1 mg/dL


(0.6-1.0) 1.1 mg/dL


(0.6-1.0) 


 





 


Estimated GFR


(Cockcroft-Gault) 52.8 


 52.8 


 


 





 


Glucose Level


 181 mg/dL


(70-99) 177 mg/dL


(70-99) 


 





 


Calcium Level


 8.0 mg/dL


(8.5-10.1) 8.2 mg/dL


(8.5-10.1) 


 





 


Phosphorus Level


 2.9 mg/dL


(2.6-4.7) 2.9 mg/dL


(2.6-4.7) 


 





 


Magnesium Level


 2.1 mg/dL


(1.8-2.4) 2.3 mg/dL


(1.8-2.4) 


 





 


White Blood Count


 


 30.1 x10^3/uL


(4.0-11.0) 


 





 


Red Blood Count


 


 2.81 x10^6/uL


(3.50-5.40) 


 





 


Hemoglobin


 


 9.1 g/dL


(12.0-15.5) 


 





 


Hematocrit


 


 27.5 %


(36.0-47.0) 


 





 


Mean Corpuscular Volume  98 fL ()   


 


Mean Corpuscular Hemoglobin  32 pg (25-35)   


 


Mean Corpuscular Hemoglobin


Concent 


 33 g/dL


(31-37) 


 





 


Red Cell Distribution Width


 


 16.8 %


(11.5-14.5) 


 





 


Platelet Count


 


 281 x10^3/uL


(140-400) 


 





 


Neutrophils (%) (Auto)  81 % (31-73)   


 


Lymphocytes (%) (Auto)  10 % (24-48)   


 


Monocytes (%) (Auto)  6 % (0-9)   


 


Eosinophils (%) (Auto)  2 % (0-3)   


 


Basophils (%) (Auto)  1 % (0-3)   


 


Neutrophils # (Auto)


 


 24.4 x10^3/uL


(1.8-7.7) 


 





 


Lymphocytes # (Auto)


 


 3.0 x10^3/uL


(1.0-4.8) 


 





 


Monocytes # (Auto)


 


 1.7 x10^3/uL


(0.0-1.1) 


 





 


Eosinophils # (Auto)


 


 0.7 x10^3/uL


(0.0-0.7) 


 





 


Basophils # (Auto)


 


 0.2 x10^3/uL


(0.0-0.2) 


 





 


BUN/Creatinine Ratio  28 (6-20)   


 


Total Bilirubin


 


 1.0 mg/dL


(0.2-1.0) 


 





 


Aspartate Amino Transf


(AST/SGOT) 


 46 U/L (15-37) 


 


 





 


Alanine Aminotransferase


(ALT/SGPT) 


 20 U/L (14-59) 


 


 





 


Alkaline Phosphatase


 


 119 U/L


() 


 





 


Total Protein


 


 5.2 g/dL


(6.4-8.2) 


 





 


Albumin


 


 2.4 g/dL


(3.4-5.0) 


 





 


Albumin/Globulin Ratio  0.9 (1.0-1.7)   


 


Glucose (Fingerstick)


 


 


 182 mg/dL


(70-99) 





 


O2 Saturation    95 % (92-99) 


 


Arterial Blood pH


 


 


 


 7.34


(7.35-7.45)


 


Arterial Blood pCO2 at


Patient Temp 


 


 


 41 mmHg


(35-46)


 


Arterial Blood pO2 at Patient


Temp 


 


 


 83 mmHg


()


 


Arterial Blood HCO3


 


 


 


 22 mmol/L


(21-28)


 


Arterial Blood Base Excess


 


 


 


 -4 mmol/L


(-3-3)


 


FiO2    30% 


 


Test


 4/1/20


11:29 


 


 





 


Glucose (Fingerstick)


 163 mg/dL


(70-99) 


 


 














Micro


Micro





Microbiology


3/24/20 Blood Culture - Final, Complete


          NO GROWTH AFTER 5 DAYS





Review of Systems


Constitutional:  yes: other (ON THE VENT)





Physical Exam


General Appearance:  other (ON THE VENT)


Skin:  warm


Respiratory:  decreased breath sounds


Heart:  S1S2


Abdomen:  soft, bowel sounds present


Genitourinary:  bladder flat


Extremities:  pulses present, edema


Neurology:  other (SEDATED)





Assessment


Assessment


IMP





[id number]-CR OF 1.3 ON CRRT


ANEMIA


LEUCOCYTOSIS


ANASARCA DUE TO 3RD SPACING


HYPERKALEMIA-RESOLVED


ACIDOSIS AND ACIDEMIA-CONTROLLED


ACUTE REPS FAILURE


ACUTE PANCREATITIS


HYPOALBUMINEMIA


HYPOCALCEMIA-BETTER





PLAN





TPN


PRBC YESTERDAY


CONT YOLI


CONT WITH CRRT-CHANGE PFR TO ZERO


CONT SAME RF AND DIALYSATE


VENT SUPPORT


PRESSORS AS NEEDED


ANTIBIOTICS


WILL FOLLOW


D/W DR MASON


VERY POOR PROGNOSIS











BETH HEIN MD                  Apr 1, 2020 11:35

## 2020-04-01 NOTE — PDOC
PROGRESS NOTES


Chief Complaint


Chief Complaint


Respiratory failure requiring mechanical ventilation


Severe Acute gallstone pancreatitis (not a surgical candidate at this time) with

necrosis


Acute kidney failure now requiring dialysis


Salpingitis


Gallstones (Calculus of gallbladder with acute cholecystitis without 

obstruction)


HTN 


Leukocytosis 


Hypoxia


Uterine fibroid


Hypoxia with respiratory failure


Intractable pain


Intractable nausea


Covid 19 negative. 


Acute on chronic anemia will have to follow up since there is suspicion for 

hemorrhagic fluid in pelvis





Plan:


discussed with mother at bedside


continue supportive measures


grim prognosis


discussed with surgical consultant


planning of trach for monday if unable to extubate





History of Present Illness


History of Present Illness


4/1/2020


No acute events reported overnight, no fever or chills, remains critically 

stable, discussed with mother at bedside. 





[id number]


Patient seen and examined in the ICU


She is critically ill


Mechanically ventilated


Assist-control/25/450/30 with 8 of PEEP


She is on cefepime daptomycin Flagyl and micafungin GEN for antibiotic coverage


Also getting TPN and CRRT


Sedated with Versed


Getting IV albumin also


She is tachycardic at 112 bpm


Temp max 100.0


White blood count is down from 45,000 35,000 today


Chart reviewed


Discussed with RN











[id number]


Patient seen and examined in the ICU


She remains very critically ill


Going for a CT of the abdomen chest and pelvis


Ventilated : On assist control 40% FiO2


Discussed with RN


Chart reviewed








[id number]


Patient seen and examined in the ICU


She is still on CRRT although we plan to change to hemodialysis soon


Chart reviewed


Discussed with RN


Hemoglobin down to 6.9 (suspect CRRT related , Will let nephrology consider 

transfusion while on dialysis)








[id number]


Patient seen and examined in the ICU


She is mechanically ventilated


Before meals/25/450/30%


Also on CRRT


Very critically ill


Chart reviewed


Discussed with RN











[id number]


Patient seen and examined in the ICU


She is now been transferred to the Covid 19 unit so we can rule out Covid and 

keep her in isolation


Very critically ill


Intubated and  ventilated


Assist control 40%


Chart reviewed


Discussed with RN


Still on CRRT


Getting a chest x-ray now


Very concerned about her prognosis








[id number]


Patient seen and examined in the ICU


She is extremely critically ill


Remains mechanically ventilated with assist control/25/450/40%


Also on continuous renal replacement therapy


Chart reviewed


Discussed with RN


She has TPN hanging


Also on pressors











[id number]


Patient seen and examined in the ICU


She is still requiring CRRT


On the vent with assist control but her FiO2 is down to 40% from yesterday


Slightly better but still very critically ill


Discussed with RN


Chart reviewed








[id number]


Patient seen and examined in the ICU


She remains extremely critically ill


On IV fentanyl IV Versed IV Doxy


On the vent


Assist-control/25/450/70%


She is critically ill


Discussed with RN


Chart reviewed


Patient is currently on CRRT as well








[id number]


Patient seen and examined in the ICU


She had to be intubated this morning


On assist-control 25/450/100% with 10 of PEEP and only satting 87%


She is extremely critically ill


I'm not sure if she will survive


Chart reviewed


Discussed with RN











Ms Tadeo is a 48yo F w/ PMHx HTN, prediabetes who presents the emergency room

complaints of abdominal pain. Patient described off and on 3 days. She states is

constant, described as a squeezing sensation in a band-like distribution. + 

nausea, vomiting.  She denies any fever or diarrhea.  Patient denies any 

abdominal surgical procedures.  She states is worse with movements, car ride.  

Pain initially was upper abdomen however now pretty much generalized.  Last 

bowel movement was 3/15/2020. Nothing makes her pain better.  Patient denies any

shortness of breath.  She does state the pain moves into her chest.  Denies any 

headache or visual changes.


Lipase 44379, , , Bilirubin 1.4.


CT abdomen confirms pancreatic inflammation, peripancreatic fluid and 

inflammatory changes around the pancreas consistent with pancreatitis. 

Cholelithiasis and 1.4cm uterine fibroid as well as possible left salpingitis. 

Admitted for further care


GI, General surgery, ID, Pulm consulted.





3/17: Overnight per report no urine output. Added dilaudid for pain, PICC placed

per IR. Renal US negative.Seen bedside in ICU, given 2L additional NSS and 

albumin infusion. Still hypotensive, started on levophed. Repeat CT abdomen with

necrosis. Updated her fiancee


3/18: Sats are only 87% on nasal cannula oxygen. Dialysis catheter per 

nephrology


3/19: She is now on BiPAP appears more ill, now on dialysis


3/20: Seen on BiPAP. Her mother and another family member are present and seemed

to be good support for her. Currently on dialysis. Appears critically ill


3/21: Overnight Tmax 101.7 , still on BiPAP FiO2 40%, still on low dose Levophed

gtt, TPN initiated. On dialysis





Overnight still febrile. Dialysis today. She wakes up and responds to pain. No 

CP.





Vitals


Vitals





Vital Signs








  Date Time  Temp Pulse Resp B/P (MAP) Pulse Ox O2 Delivery O2 Flow Rate FiO2


 


4/1/20 16:00 99.0 110 25 106/64 (78) 100 Ventilator  





 99.0       


 


3/31/20 12:10       6.0 











Physical Exam


Physical Exam


GENERAL: Orally intubated/sedated - generalizd anasarca 


HEENT: Mild icterus, pupils equal, ETT, NGT


NECK:  Supple. 


LUNGS:  Decreased breath sounds at the bases.


HEART:  S1, S2, regular 


ABDOMEN:  Distended, hypoactive BS, Rectal tube in place 


: [place]   


EXTREMITIES: Generalized edema, no cyanosis - some mottling, [place] boots 

bilaterally 


DERMATOLOGIC:  Warm and dry.  No generalized rash.  


CENTRAL NERVOUS SYSTEM: Sedated 


RIJ Temp HDC, RUE-PICC & LIJ - clean


General:  No acute distress


Heart:  Other (increased rate)


Lungs:  Other (decrease bs)


Abdomen:  Soft, Other (distended, NTTP)


Extremities:  No edema, Other (SOME CLUBBING )


Skin:  Other (mottling noted to extremities )





Labs


LABS





Laboratory Tests








Test


 3/31/20


17:30 3/31/20


17:40 4/1/20


00:10 4/1/20


00:15


 


White Blood Count


 37.4 x10^3/uL


(4.0-11.0) 


 


 





 


Red Blood Count


 2.89 x10^6/uL


(3.50-5.40) 


 


 





 


Hemoglobin


 9.3 g/dL


(12.0-15.5) 


 


 





 


Hematocrit


 28.0 %


(36.0-47.0) 


 


 





 


Mean Corpuscular Volume 97 fL ()    


 


Mean Corpuscular Hemoglobin 32 pg (25-35)    


 


Mean Corpuscular Hemoglobin


Concent 33 g/dL


(31-37) 


 


 





 


Red Cell Distribution Width


 17.1 %


(11.5-14.5) 


 


 





 


Platelet Count


 307 x10^3/uL


(140-400) 


 


 





 


Neutrophils (%) (Auto) 86 % (31-73)    


 


Lymphocytes (%) (Auto) 6 % (24-48)    


 


Monocytes (%) (Auto) 5 % (0-9)    


 


Eosinophils (%) (Auto) 2 % (0-3)    


 


Basophils (%) (Auto) 1 % (0-3)    


 


Neutrophils # (Auto)


 32.1 x10^3/uL


(1.8-7.7) 


 


 





 


Lymphocytes # (Auto)


 2.4 x10^3/uL


(1.0-4.8) 


 


 





 


Monocytes # (Auto)


 1.9 x10^3/uL


(0.0-1.1) 


 


 





 


Eosinophils # (Auto)


 0.8 x10^3/uL


(0.0-0.7) 


 


 





 


Basophils # (Auto)


 0.2 x10^3/uL


(0.0-0.2) 


 


 





 


Sodium Level


 


 138 mmol/L


(136-145) 


 137 mmol/L


(136-145)


 


Potassium Level


 


 4.1 mmol/L


(3.5-5.1) 


 4.1 mmol/L


(3.5-5.1)


 


Chloride Level


 


 104 mmol/L


() 


 103 mmol/L


()


 


Carbon Dioxide Level


 


 28 mmol/L


(21-32) 


 28 mmol/L


(21-32)


 


Anion Gap  6 (6-14)   6 (6-14) 


 


Blood Urea Nitrogen


 


 33 mg/dL


(7-20) 


 32 mg/dL


(7-20)


 


Creatinine


 


 1.2 mg/dL


(0.6-1.0) 


 1.1 mg/dL


(0.6-1.0)


 


Estimated GFR


(Cockcroft-Gault) 


 47.7 


 


 52.8 





 


BUN/Creatinine Ratio  28 (6-20)   


 


Glucose Level


 


 185 mg/dL


(70-99) 


 181 mg/dL


(70-99)


 


Calcium Level


 


 8.1 mg/dL


(8.5-10.1) 


 8.0 mg/dL


(8.5-10.1)


 


Phosphorus Level


 


 3.1 mg/dL


(2.6-4.7) 


 2.9 mg/dL


(2.6-4.7)


 


Magnesium Level


 


 2.3 mg/dL


(1.8-2.4) 


 2.1 mg/dL


(1.8-2.4)


 


Total Bilirubin


 


 1.0 mg/dL


(0.2-1.0) 


 





 


Aspartate Amino Transf


(AST/SGOT) 


 50 U/L (15-37) 


 


 





 


Alanine Aminotransferase


(ALT/SGPT) 


 21 U/L (14-59) 


 


 





 


Alkaline Phosphatase


 


 128 U/L


() 


 





 


Total Protein


 


 5.3 g/dL


(6.4-8.2) 


 





 


Albumin


 


 2.5 g/dL


(3.4-5.0) 


 





 


Albumin/Globulin Ratio  0.9 (1.0-1.7)   


 


Glucose (Fingerstick)


 


 


 170 mg/dL


(70-99) 





 


Test


 4/1/20


05:30 4/1/20


05:37 4/1/20


08:00 4/1/20


11:29


 


White Blood Count


 30.1 x10^3/uL


(4.0-11.0) 


 


 





 


Red Blood Count


 2.81 x10^6/uL


(3.50-5.40) 


 


 





 


Hemoglobin


 9.1 g/dL


(12.0-15.5) 


 


 





 


Hematocrit


 27.5 %


(36.0-47.0) 


 


 





 


Mean Corpuscular Volume 98 fL ()    


 


Mean Corpuscular Hemoglobin 32 pg (25-35)    


 


Mean Corpuscular Hemoglobin


Concent 33 g/dL


(31-37) 


 


 





 


Red Cell Distribution Width


 16.8 %


(11.5-14.5) 


 


 





 


Platelet Count


 281 x10^3/uL


(140-400) 


 


 





 


Neutrophils (%) (Auto) 81 % (31-73)    


 


Lymphocytes (%) (Auto) 10 % (24-48)    


 


Monocytes (%) (Auto) 6 % (0-9)    


 


Eosinophils (%) (Auto) 2 % (0-3)    


 


Basophils (%) (Auto) 1 % (0-3)    


 


Neutrophils # (Auto)


 24.4 x10^3/uL


(1.8-7.7) 


 


 





 


Lymphocytes # (Auto)


 3.0 x10^3/uL


(1.0-4.8) 


 


 





 


Monocytes # (Auto)


 1.7 x10^3/uL


(0.0-1.1) 


 


 





 


Eosinophils # (Auto)


 0.7 x10^3/uL


(0.0-0.7) 


 


 





 


Basophils # (Auto)


 0.2 x10^3/uL


(0.0-0.2) 


 


 





 


Sodium Level


 138 mmol/L


(136-145) 


 


 





 


Potassium Level


 4.0 mmol/L


(3.5-5.1) 


 


 





 


Chloride Level


 104 mmol/L


() 


 


 





 


Carbon Dioxide Level


 28 mmol/L


(21-32) 


 


 





 


Anion Gap 6 (6-14)    


 


Blood Urea Nitrogen


 31 mg/dL


(7-20) 


 


 





 


Creatinine


 1.1 mg/dL


(0.6-1.0) 


 


 





 


Estimated GFR


(Cockcroft-Gault) 52.8 


 


 


 





 


BUN/Creatinine Ratio 28 (6-20)    


 


Glucose Level


 177 mg/dL


(70-99) 


 


 





 


Calcium Level


 8.2 mg/dL


(8.5-10.1) 


 


 





 


Phosphorus Level


 2.9 mg/dL


(2.6-4.7) 


 


 





 


Magnesium Level


 2.3 mg/dL


(1.8-2.4) 


 


 





 


Total Bilirubin


 1.0 mg/dL


(0.2-1.0) 


 


 





 


Aspartate Amino Transf


(AST/SGOT) 46 U/L (15-37) 


 


 


 





 


Alanine Aminotransferase


(ALT/SGPT) 20 U/L (14-59) 


 


 


 





 


Alkaline Phosphatase


 119 U/L


() 


 


 





 


Total Protein


 5.2 g/dL


(6.4-8.2) 


 


 





 


Albumin


 2.4 g/dL


(3.4-5.0) 


 


 





 


Albumin/Globulin Ratio 0.9 (1.0-1.7)    


 


Glucose (Fingerstick)


 


 182 mg/dL


(70-99) 


 163 mg/dL


(70-99)


 


O2 Saturation   95 % (92-99)  


 


Arterial Blood pH


 


 


 7.34


(7.35-7.45) 





 


Arterial Blood pCO2 at


Patient Temp 


 


 41 mmHg


(35-46) 





 


Arterial Blood pO2 at Patient


Temp 


 


 83 mmHg


() 





 


Arterial Blood HCO3


 


 


 22 mmol/L


(21-28) 





 


Arterial Blood Base Excess


 


 


 -4 mmol/L


(-3-3) 





 


FiO2   30%  


 


Test


 4/1/20


11:30 4/1/20


16:00 


 





 


Sodium Level


 137 mmol/L


(136-145) 137 mmol/L


(136-145) 


 





 


Potassium Level


 3.7 mmol/L


(3.5-5.1) 3.9 mmol/L


(3.5-5.1) 


 





 


Chloride Level


 104 mmol/L


() 104 mmol/L


() 


 





 


Carbon Dioxide Level


 29 mmol/L


(21-32) 28 mmol/L


(21-32) 


 





 


Anion Gap 4 (6-14)  5 (6-14)   


 


Blood Urea Nitrogen


 31 mg/dL


(7-20) 30 mg/dL


(7-20) 


 





 


Creatinine


 1.1 mg/dL


(0.6-1.0) 1.1 mg/dL


(0.6-1.0) 


 





 


Estimated GFR


(Cockcroft-Gault) 52.8 


 52.8 


 


 





 


Glucose Level


 165 mg/dL


(70-99) 170 mg/dL


(70-99) 


 





 


Calcium Level


 7.2 mg/dL


(8.5-10.1) 8.2 mg/dL


(8.5-10.1) 


 





 


Phosphorus Level


 2.9 mg/dL


(2.6-4.7) 2.9 mg/dL


(2.6-4.7) 


 





 


Magnesium Level


 2.0 mg/dL


(1.8-2.4) 2.3 mg/dL


(1.8-2.4) 


 





 


White Blood Count


 


 22.6 x10^3/uL


(4.0-11.0) 


 





 


Red Blood Count


 


 2.75 x10^6/uL


(3.50-5.40) 


 





 


Hemoglobin


 


 8.9 g/dL


(12.0-15.5) 


 





 


Hematocrit


 


 26.9 %


(36.0-47.0) 


 





 


Mean Corpuscular Volume  98 fL ()   


 


Mean Corpuscular Hemoglobin  32 pg (25-35)   


 


Mean Corpuscular Hemoglobin


Concent 


 33 g/dL


(31-37) 


 





 


Red Cell Distribution Width


 


 17.1 %


(11.5-14.5) 


 





 


Platelet Count


 


 257 x10^3/uL


(140-400) 


 





 


Neutrophils (%) (Auto)  82 % (31-73)   


 


Lymphocytes (%) (Auto)  10 % (24-48)   


 


Monocytes (%) (Auto)  6 % (0-9)   


 


Eosinophils (%) (Auto)  2 % (0-3)   


 


Basophils (%) (Auto)  1 % (0-3)   


 


Neutrophils # (Auto)


 


 18.5 x10^3/uL


(1.8-7.7) 


 





 


Lymphocytes # (Auto)


 


 2.2 x10^3/uL


(1.0-4.8) 


 





 


Monocytes # (Auto)


 


 1.3 x10^3/uL


(0.0-1.1) 


 





 


Eosinophils # (Auto)


 


 0.5 x10^3/uL


(0.0-0.7) 


 





 


Basophils # (Auto)


 


 0.1 x10^3/uL


(0.0-0.2) 


 














Assessment and Plan


Assessmemt and Plan


Problems


Medical Problems:


(1) Acute pancreatitis


Status: Acute  





(2) Cholelithiasis


Status: Acute  











Comment


Review of Relevant


I have reviewed the following items josy (where applicable) has been applied.


Labs





Laboratory Tests








Test


 3/30/20


17:32 3/30/20


23:33 3/31/20


00:30 3/31/20


05:30


 


Glucose (Fingerstick)


 161 mg/dL


(70-99) 180 mg/dL


(70-99) 


 





 


Sodium Level


 


 


 137 mmol/L


(136-145) 138 mmol/L


(136-145)


 


Potassium Level


 


 


 4.1 mmol/L


(3.5-5.1) 4.1 mmol/L


(3.5-5.1)


 


Chloride Level


 


 


 104 mmol/L


() 105 mmol/L


()


 


Carbon Dioxide Level


 


 


 28 mmol/L


(21-32) 27 mmol/L


(21-32)


 


Anion Gap   5 (6-14)  6 (6-14) 


 


Blood Urea Nitrogen


 


 


 35 mg/dL


(7-20) 34 mg/dL


(7-20)


 


Creatinine


 


 


 1.3 mg/dL


(0.6-1.0) 1.3 mg/dL


(0.6-1.0)


 


Estimated GFR


(Cockcroft-Gault) 


 


 43.5 


 43.5 





 


Glucose Level


 


 


 193 mg/dL


(70-99) 185 mg/dL


(70-99)


 


Calcium Level


 


 


 7.9 mg/dL


(8.5-10.1) 8.1 mg/dL


(8.5-10.1)


 


Phosphorus Level


 


 


 2.9 mg/dL


(2.6-4.7) 2.9 mg/dL


(2.6-4.7)


 


Magnesium Level


 


 


 2.1 mg/dL


(1.8-2.4) 2.4 mg/dL


(1.8-2.4)


 


White Blood Count


 


 


 


 35.5 x10^3/uL


(4.0-11.0)


 


Red Blood Count


 


 


 


 1.92 x10^6/uL


(3.50-5.40)


 


Hemoglobin


 


 


 


 6.5 g/dL


(12.0-15.5)


 


Hematocrit


 


 


 


 19.4 %


(36.0-47.0)


 


Mean Corpuscular Volume


 


 


 


 101 fL


()


 


Mean Corpuscular Hemoglobin    34 pg (25-35) 


 


Mean Corpuscular Hemoglobin


Concent 


 


 


 33 g/dL


(31-37)


 


Red Cell Distribution Width


 


 


 


 15.4 %


(11.5-14.5)


 


Platelet Count


 


 


 


 354 x10^3/uL


(140-400)


 


Neutrophils (%) (Auto)    82 % (31-73) 


 


Lymphocytes (%) (Auto)    12 % (24-48) 


 


Monocytes (%) (Auto)    5 % (0-9) 


 


Eosinophils (%) (Auto)    1 % (0-3) 


 


Basophils (%) (Auto)    1 % (0-3) 


 


Neutrophils # (Auto)


 


 


 


 29.1 x10^3/uL


(1.8-7.7)


 


Lymphocytes # (Auto)


 


 


 


 4.1 x10^3/uL


(1.0-4.8)


 


Monocytes # (Auto)


 


 


 


 1.7 x10^3/uL


(0.0-1.1)


 


Eosinophils # (Auto)


 


 


 


 0.4 x10^3/uL


(0.0-0.7)


 


Basophils # (Auto)


 


 


 


 0.2 x10^3/uL


(0.0-0.2)


 


Test


 3/31/20


05:43 3/31/20


08:00 3/31/20


12:00 3/31/20


17:30


 


Glucose (Fingerstick)


 194 mg/dL


(70-99) 


 


 





 


O2 Saturation  93 % (92-99)   


 


Arterial Blood pH


 


 7.34


(7.35-7.45) 


 





 


Arterial Blood pCO2 at


Patient Temp 


 47 mmHg


(35-46) 


 





 


Arterial Blood pO2 at Patient


Temp 


 73 mmHg


() 


 





 


Arterial Blood HCO3


 


 25 mmol/L


(21-28) 


 





 


Arterial Blood Base Excess


 


 -1 mmol/L


(-3-3) 


 





 


FiO2  40   


 


Prothrombin Time


 


 


 21.3 SEC


(11.7-14.0) 





 


Prothromb Time International


Ratio 


 


 1.9 (0.8-1.1) 


 





 


Sodium Level


 


 


 138 mmol/L


(136-145) 





 


Potassium Level


 


 


 3.9 mmol/L


(3.5-5.1) 





 


Chloride Level


 


 


 103 mmol/L


() 





 


Carbon Dioxide Level


 


 


 27 mmol/L


(21-32) 





 


Anion Gap   8 (6-14)  


 


Blood Urea Nitrogen


 


 


 33 mg/dL


(7-20) 





 


Creatinine


 


 


 1.2 mg/dL


(0.6-1.0) 





 


Estimated GFR


(Cockcroft-Gault) 


 


 47.7 


 





 


Glucose Level


 


 


 192 mg/dL


(70-99) 





 


Calcium Level


 


 


 8.0 mg/dL


(8.5-10.1) 





 


Phosphorus Level


 


 


 2.7 mg/dL


(2.6-4.7) 





 


Magnesium Level


 


 


 2.2 mg/dL


(1.8-2.4) 





 


White Blood Count


 


 


 


 37.4 x10^3/uL


(4.0-11.0)


 


Red Blood Count


 


 


 


 2.89 x10^6/uL


(3.50-5.40)


 


Hemoglobin


 


 


 


 9.3 g/dL


(12.0-15.5)


 


Hematocrit


 


 


 


 28.0 %


(36.0-47.0)


 


Mean Corpuscular Volume    97 fL () 


 


Mean Corpuscular Hemoglobin    32 pg (25-35) 


 


Mean Corpuscular Hemoglobin


Concent 


 


 


 33 g/dL


(31-37)


 


Red Cell Distribution Width


 


 


 


 17.1 %


(11.5-14.5)


 


Platelet Count


 


 


 


 307 x10^3/uL


(140-400)


 


Neutrophils (%) (Auto)    86 % (31-73) 


 


Lymphocytes (%) (Auto)    6 % (24-48) 


 


Monocytes (%) (Auto)    5 % (0-9) 


 


Eosinophils (%) (Auto)    2 % (0-3) 


 


Basophils (%) (Auto)    1 % (0-3) 


 


Neutrophils # (Auto)


 


 


 


 32.1 x10^3/uL


(1.8-7.7)


 


Lymphocytes # (Auto)


 


 


 


 2.4 x10^3/uL


(1.0-4.8)


 


Monocytes # (Auto)


 


 


 


 1.9 x10^3/uL


(0.0-1.1)


 


Eosinophils # (Auto)


 


 


 


 0.8 x10^3/uL


(0.0-0.7)


 


Basophils # (Auto)


 


 


 


 0.2 x10^3/uL


(0.0-0.2)


 


Test


 3/31/20


17:40 4/1/20


00:10 4/1/20


00:15 4/1/20


05:30


 


Sodium Level


 138 mmol/L


(136-145) 


 137 mmol/L


(136-145) 138 mmol/L


(136-145)


 


Potassium Level


 4.1 mmol/L


(3.5-5.1) 


 4.1 mmol/L


(3.5-5.1) 4.0 mmol/L


(3.5-5.1)


 


Chloride Level


 104 mmol/L


() 


 103 mmol/L


() 104 mmol/L


()


 


Carbon Dioxide Level


 28 mmol/L


(21-32) 


 28 mmol/L


(21-32) 28 mmol/L


(21-32)


 


Anion Gap 6 (6-14)   6 (6-14)  6 (6-14) 


 


Blood Urea Nitrogen


 33 mg/dL


(7-20) 


 32 mg/dL


(7-20) 31 mg/dL


(7-20)


 


Creatinine


 1.2 mg/dL


(0.6-1.0) 


 1.1 mg/dL


(0.6-1.0) 1.1 mg/dL


(0.6-1.0)


 


Estimated GFR


(Cockcroft-Gault) 47.7 


 


 52.8 


 52.8 





 


BUN/Creatinine Ratio 28 (6-20)    28 (6-20) 


 


Glucose Level


 185 mg/dL


(70-99) 


 181 mg/dL


(70-99) 177 mg/dL


(70-99)


 


Calcium Level


 8.1 mg/dL


(8.5-10.1) 


 8.0 mg/dL


(8.5-10.1) 8.2 mg/dL


(8.5-10.1)


 


Phosphorus Level


 3.1 mg/dL


(2.6-4.7) 


 2.9 mg/dL


(2.6-4.7) 2.9 mg/dL


(2.6-4.7)


 


Magnesium Level


 2.3 mg/dL


(1.8-2.4) 


 2.1 mg/dL


(1.8-2.4) 2.3 mg/dL


(1.8-2.4)


 


Total Bilirubin


 1.0 mg/dL


(0.2-1.0) 


 


 1.0 mg/dL


(0.2-1.0)


 


Aspartate Amino Transf


(AST/SGOT) 50 U/L (15-37) 


 


 


 46 U/L (15-37) 





 


Alanine Aminotransferase


(ALT/SGPT) 21 U/L (14-59) 


 


 


 20 U/L (14-59) 





 


Alkaline Phosphatase


 128 U/L


() 


 


 119 U/L


()


 


Total Protein


 5.3 g/dL


(6.4-8.2) 


 


 5.2 g/dL


(6.4-8.2)


 


Albumin


 2.5 g/dL


(3.4-5.0) 


 


 2.4 g/dL


(3.4-5.0)


 


Albumin/Globulin Ratio 0.9 (1.0-1.7)    0.9 (1.0-1.7) 


 


Glucose (Fingerstick)


 


 170 mg/dL


(70-99) 


 





 


White Blood Count


 


 


 


 30.1 x10^3/uL


(4.0-11.0)


 


Red Blood Count


 


 


 


 2.81 x10^6/uL


(3.50-5.40)


 


Hemoglobin


 


 


 


 9.1 g/dL


(12.0-15.5)


 


Hematocrit


 


 


 


 27.5 %


(36.0-47.0)


 


Mean Corpuscular Volume    98 fL () 


 


Mean Corpuscular Hemoglobin    32 pg (25-35) 


 


Mean Corpuscular Hemoglobin


Concent 


 


 


 33 g/dL


(31-37)


 


Red Cell Distribution Width


 


 


 


 16.8 %


(11.5-14.5)


 


Platelet Count


 


 


 


 281 x10^3/uL


(140-400)


 


Neutrophils (%) (Auto)    81 % (31-73) 


 


Lymphocytes (%) (Auto)    10 % (24-48) 


 


Monocytes (%) (Auto)    6 % (0-9) 


 


Eosinophils (%) (Auto)    2 % (0-3) 


 


Basophils (%) (Auto)    1 % (0-3) 


 


Neutrophils # (Auto)


 


 


 


 24.4 x10^3/uL


(1.8-7.7)


 


Lymphocytes # (Auto)


 


 


 


 3.0 x10^3/uL


(1.0-4.8)


 


Monocytes # (Auto)


 


 


 


 1.7 x10^3/uL


(0.0-1.1)


 


Eosinophils # (Auto)


 


 


 


 0.7 x10^3/uL


(0.0-0.7)


 


Basophils # (Auto)


 


 


 


 0.2 x10^3/uL


(0.0-0.2)


 


Test


 4/1/20


05:37 4/1/20


08:00 4/1/20


11:29 4/1/20


11:30


 


Glucose (Fingerstick)


 182 mg/dL


(70-99) 


 163 mg/dL


(70-99) 





 


O2 Saturation  95 % (92-99)   


 


Arterial Blood pH


 


 7.34


(7.35-7.45) 


 





 


Arterial Blood pCO2 at


Patient Temp 


 41 mmHg


(35-46) 


 





 


Arterial Blood pO2 at Patient


Temp 


 83 mmHg


() 


 





 


Arterial Blood HCO3


 


 22 mmol/L


(21-28) 


 





 


Arterial Blood Base Excess


 


 -4 mmol/L


(-3-3) 


 





 


FiO2  30%   


 


Sodium Level


 


 


 


 137 mmol/L


(136-145)


 


Potassium Level


 


 


 


 3.7 mmol/L


(3.5-5.1)


 


Chloride Level


 


 


 


 104 mmol/L


()


 


Carbon Dioxide Level


 


 


 


 29 mmol/L


(21-32)


 


Anion Gap    4 (6-14) 


 


Blood Urea Nitrogen


 


 


 


 31 mg/dL


(7-20)


 


Creatinine


 


 


 


 1.1 mg/dL


(0.6-1.0)


 


Estimated GFR


(Cockcroft-Gault) 


 


 


 52.8 





 


Glucose Level


 


 


 


 165 mg/dL


(70-99)


 


Calcium Level


 


 


 


 7.2 mg/dL


(8.5-10.1)


 


Phosphorus Level


 


 


 


 2.9 mg/dL


(2.6-4.7)


 


Magnesium Level


 


 


 


 2.0 mg/dL


(1.8-2.4)


 


Test


 4/1/20


16:00 


 


 





 


White Blood Count


 22.6 x10^3/uL


(4.0-11.0) 


 


 





 


Red Blood Count


 2.75 x10^6/uL


(3.50-5.40) 


 


 





 


Hemoglobin


 8.9 g/dL


(12.0-15.5) 


 


 





 


Hematocrit


 26.9 %


(36.0-47.0) 


 


 





 


Mean Corpuscular Volume 98 fL ()    


 


Mean Corpuscular Hemoglobin 32 pg (25-35)    


 


Mean Corpuscular Hemoglobin


Concent 33 g/dL


(31-37) 


 


 





 


Red Cell Distribution Width


 17.1 %


(11.5-14.5) 


 


 





 


Platelet Count


 257 x10^3/uL


(140-400) 


 


 





 


Neutrophils (%) (Auto) 82 % (31-73)    


 


Lymphocytes (%) (Auto) 10 % (24-48)    


 


Monocytes (%) (Auto) 6 % (0-9)    


 


Eosinophils (%) (Auto) 2 % (0-3)    


 


Basophils (%) (Auto) 1 % (0-3)    


 


Neutrophils # (Auto)


 18.5 x10^3/uL


(1.8-7.7) 


 


 





 


Lymphocytes # (Auto)


 2.2 x10^3/uL


(1.0-4.8) 


 


 





 


Monocytes # (Auto)


 1.3 x10^3/uL


(0.0-1.1) 


 


 





 


Eosinophils # (Auto)


 0.5 x10^3/uL


(0.0-0.7) 


 


 





 


Basophils # (Auto)


 0.1 x10^3/uL


(0.0-0.2) 


 


 





 


Sodium Level


 137 mmol/L


(136-145) 


 


 





 


Potassium Level


 3.9 mmol/L


(3.5-5.1) 


 


 





 


Chloride Level


 104 mmol/L


() 


 


 





 


Carbon Dioxide Level


 28 mmol/L


(21-32) 


 


 





 


Anion Gap 5 (6-14)    


 


Blood Urea Nitrogen


 30 mg/dL


(7-20) 


 


 





 


Creatinine


 1.1 mg/dL


(0.6-1.0) 


 


 





 


Estimated GFR


(Cockcroft-Gault) 52.8 


 


 


 





 


Glucose Level


 170 mg/dL


(70-99) 


 


 





 


Calcium Level


 8.2 mg/dL


(8.5-10.1) 


 


 





 


Phosphorus Level


 2.9 mg/dL


(2.6-4.7) 


 


 





 


Magnesium Level


 2.3 mg/dL


(1.8-2.4) 


 


 











Laboratory Tests








Test


 3/31/20


17:30 3/31/20


17:40 4/1/20


00:10 4/1/20


00:15


 


White Blood Count


 37.4 x10^3/uL


(4.0-11.0) 


 


 





 


Red Blood Count


 2.89 x10^6/uL


(3.50-5.40) 


 


 





 


Hemoglobin


 9.3 g/dL


(12.0-15.5) 


 


 





 


Hematocrit


 28.0 %


(36.0-47.0) 


 


 





 


Mean Corpuscular Volume 97 fL ()    


 


Mean Corpuscular Hemoglobin 32 pg (25-35)    


 


Mean Corpuscular Hemoglobin


Concent 33 g/dL


(31-37) 


 


 





 


Red Cell Distribution Width


 17.1 %


(11.5-14.5) 


 


 





 


Platelet Count


 307 x10^3/uL


(140-400) 


 


 





 


Neutrophils (%) (Auto) 86 % (31-73)    


 


Lymphocytes (%) (Auto) 6 % (24-48)    


 


Monocytes (%) (Auto) 5 % (0-9)    


 


Eosinophils (%) (Auto) 2 % (0-3)    


 


Basophils (%) (Auto) 1 % (0-3)    


 


Neutrophils # (Auto)


 32.1 x10^3/uL


(1.8-7.7) 


 


 





 


Lymphocytes # (Auto)


 2.4 x10^3/uL


(1.0-4.8) 


 


 





 


Monocytes # (Auto)


 1.9 x10^3/uL


(0.0-1.1) 


 


 





 


Eosinophils # (Auto)


 0.8 x10^3/uL


(0.0-0.7) 


 


 





 


Basophils # (Auto)


 0.2 x10^3/uL


(0.0-0.2) 


 


 





 


Sodium Level


 


 138 mmol/L


(136-145) 


 137 mmol/L


(136-145)


 


Potassium Level


 


 4.1 mmol/L


(3.5-5.1) 


 4.1 mmol/L


(3.5-5.1)


 


Chloride Level


 


 104 mmol/L


() 


 103 mmol/L


()


 


Carbon Dioxide Level


 


 28 mmol/L


(21-32) 


 28 mmol/L


(21-32)


 


Anion Gap  6 (6-14)   6 (6-14) 


 


Blood Urea Nitrogen


 


 33 mg/dL


(7-20) 


 32 mg/dL


(7-20)


 


Creatinine


 


 1.2 mg/dL


(0.6-1.0) 


 1.1 mg/dL


(0.6-1.0)


 


Estimated GFR


(Cockcroft-Gault) 


 47.7 


 


 52.8 





 


BUN/Creatinine Ratio  28 (6-20)   


 


Glucose Level


 


 185 mg/dL


(70-99) 


 181 mg/dL


(70-99)


 


Calcium Level


 


 8.1 mg/dL


(8.5-10.1) 


 8.0 mg/dL


(8.5-10.1)


 


Phosphorus Level


 


 3.1 mg/dL


(2.6-4.7) 


 2.9 mg/dL


(2.6-4.7)


 


Magnesium Level


 


 2.3 mg/dL


(1.8-2.4) 


 2.1 mg/dL


(1.8-2.4)


 


Total Bilirubin


 


 1.0 mg/dL


(0.2-1.0) 


 





 


Aspartate Amino Transf


(AST/SGOT) 


 50 U/L (15-37) 


 


 





 


Alanine Aminotransferase


(ALT/SGPT) 


 21 U/L (14-59) 


 


 





 


Alkaline Phosphatase


 


 128 U/L


() 


 





 


Total Protein


 


 5.3 g/dL


(6.4-8.2) 


 





 


Albumin


 


 2.5 g/dL


(3.4-5.0) 


 





 


Albumin/Globulin Ratio  0.9 (1.0-1.7)   


 


Glucose (Fingerstick)


 


 


 170 mg/dL


(70-99) 





 


Test


 4/1/20


05:30 4/1/20


05:37 4/1/20


08:00 4/1/20


11:29


 


White Blood Count


 30.1 x10^3/uL


(4.0-11.0) 


 


 





 


Red Blood Count


 2.81 x10^6/uL


(3.50-5.40) 


 


 





 


Hemoglobin


 9.1 g/dL


(12.0-15.5) 


 


 





 


Hematocrit


 27.5 %


(36.0-47.0) 


 


 





 


Mean Corpuscular Volume 98 fL ()    


 


Mean Corpuscular Hemoglobin 32 pg (25-35)    


 


Mean Corpuscular Hemoglobin


Concent 33 g/dL


(31-37) 


 


 





 


Red Cell Distribution Width


 16.8 %


(11.5-14.5) 


 


 





 


Platelet Count


 281 x10^3/uL


(140-400) 


 


 





 


Neutrophils (%) (Auto) 81 % (31-73)    


 


Lymphocytes (%) (Auto) 10 % (24-48)    


 


Monocytes (%) (Auto) 6 % (0-9)    


 


Eosinophils (%) (Auto) 2 % (0-3)    


 


Basophils (%) (Auto) 1 % (0-3)    


 


Neutrophils # (Auto)


 24.4 x10^3/uL


(1.8-7.7) 


 


 





 


Lymphocytes # (Auto)


 3.0 x10^3/uL


(1.0-4.8) 


 


 





 


Monocytes # (Auto)


 1.7 x10^3/uL


(0.0-1.1) 


 


 





 


Eosinophils # (Auto)


 0.7 x10^3/uL


(0.0-0.7) 


 


 





 


Basophils # (Auto)


 0.2 x10^3/uL


(0.0-0.2) 


 


 





 


Sodium Level


 138 mmol/L


(136-145) 


 


 





 


Potassium Level


 4.0 mmol/L


(3.5-5.1) 


 


 





 


Chloride Level


 104 mmol/L


() 


 


 





 


Carbon Dioxide Level


 28 mmol/L


(21-32) 


 


 





 


Anion Gap 6 (6-14)    


 


Blood Urea Nitrogen


 31 mg/dL


(7-20) 


 


 





 


Creatinine


 1.1 mg/dL


(0.6-1.0) 


 


 





 


Estimated GFR


(Cockcroft-Gault) 52.8 


 


 


 





 


BUN/Creatinine Ratio 28 (6-20)    


 


Glucose Level


 177 mg/dL


(70-99) 


 


 





 


Calcium Level


 8.2 mg/dL


(8.5-10.1) 


 


 





 


Phosphorus Level


 2.9 mg/dL


(2.6-4.7) 


 


 





 


Magnesium Level


 2.3 mg/dL


(1.8-2.4) 


 


 





 


Total Bilirubin


 1.0 mg/dL


(0.2-1.0) 


 


 





 


Aspartate Amino Transf


(AST/SGOT) 46 U/L (15-37) 


 


 


 





 


Alanine Aminotransferase


(ALT/SGPT) 20 U/L (14-59) 


 


 


 





 


Alkaline Phosphatase


 119 U/L


() 


 


 





 


Total Protein


 5.2 g/dL


(6.4-8.2) 


 


 





 


Albumin


 2.4 g/dL


(3.4-5.0) 


 


 





 


Albumin/Globulin Ratio 0.9 (1.0-1.7)    


 


Glucose (Fingerstick)


 


 182 mg/dL


(70-99) 


 163 mg/dL


(70-99)


 


O2 Saturation   95 % (92-99)  


 


Arterial Blood pH


 


 


 7.34


(7.35-7.45) 





 


Arterial Blood pCO2 at


Patient Temp 


 


 41 mmHg


(35-46) 





 


Arterial Blood pO2 at Patient


Temp 


 


 83 mmHg


() 





 


Arterial Blood HCO3


 


 


 22 mmol/L


(21-28) 





 


Arterial Blood Base Excess


 


 


 -4 mmol/L


(-3-3) 





 


FiO2   30%  


 


Test


 4/1/20


11:30 4/1/20


16:00 


 





 


Sodium Level


 137 mmol/L


(136-145) 137 mmol/L


(136-145) 


 





 


Potassium Level


 3.7 mmol/L


(3.5-5.1) 3.9 mmol/L


(3.5-5.1) 


 





 


Chloride Level


 104 mmol/L


() 104 mmol/L


() 


 





 


Carbon Dioxide Level


 29 mmol/L


(21-32) 28 mmol/L


(21-32) 


 





 


Anion Gap 4 (6-14)  5 (6-14)   


 


Blood Urea Nitrogen


 31 mg/dL


(7-20) 30 mg/dL


(7-20) 


 





 


Creatinine


 1.1 mg/dL


(0.6-1.0) 1.1 mg/dL


(0.6-1.0) 


 





 


Estimated GFR


(Cockcroft-Gault) 52.8 


 52.8 


 


 





 


Glucose Level


 165 mg/dL


(70-99) 170 mg/dL


(70-99) 


 





 


Calcium Level


 7.2 mg/dL


(8.5-10.1) 8.2 mg/dL


(8.5-10.1) 


 





 


Phosphorus Level


 2.9 mg/dL


(2.6-4.7) 2.9 mg/dL


(2.6-4.7) 


 





 


Magnesium Level


 2.0 mg/dL


(1.8-2.4) 2.3 mg/dL


(1.8-2.4) 


 





 


White Blood Count


 


 22.6 x10^3/uL


(4.0-11.0) 


 





 


Red Blood Count


 


 2.75 x10^6/uL


(3.50-5.40) 


 





 


Hemoglobin


 


 8.9 g/dL


(12.0-15.5) 


 





 


Hematocrit


 


 26.9 %


(36.0-47.0) 


 





 


Mean Corpuscular Volume  98 fL ()   


 


Mean Corpuscular Hemoglobin  32 pg (25-35)   


 


Mean Corpuscular Hemoglobin


Concent 


 33 g/dL


(31-37) 


 





 


Red Cell Distribution Width


 


 17.1 %


(11.5-14.5) 


 





 


Platelet Count


 


 257 x10^3/uL


(140-400) 


 





 


Neutrophils (%) (Auto)  82 % (31-73)   


 


Lymphocytes (%) (Auto)  10 % (24-48)   


 


Monocytes (%) (Auto)  6 % (0-9)   


 


Eosinophils (%) (Auto)  2 % (0-3)   


 


Basophils (%) (Auto)  1 % (0-3)   


 


Neutrophils # (Auto)


 


 18.5 x10^3/uL


(1.8-7.7) 


 





 


Lymphocytes # (Auto)


 


 2.2 x10^3/uL


(1.0-4.8) 


 





 


Monocytes # (Auto)


 


 1.3 x10^3/uL


(0.0-1.1) 


 





 


Eosinophils # (Auto)


 


 0.5 x10^3/uL


(0.0-0.7) 


 





 


Basophils # (Auto)


 


 0.1 x10^3/uL


(0.0-0.2) 


 











Microbiology


3/24/20 Blood Culture - Final, Complete


          NO GROWTH AFTER 5 DAYS


Medications





Current Medications


Sodium Chloride 1,000 ml @  1,000 mls/hr Q1H IV  Last administered on 3/16/20at 

03:00;  Start 3/16/20 at 03:00;  Stop 3/16/20 at 03:59;  Status DC


Ondansetron HCl (Zofran) 4 mg 1X  ONCE IVP  Last administered on 3/16/20at 03:2

7;  Start 3/16/20 at 03:00;  Stop 3/16/20 at 03:01;  Status DC


Morphine Sulfate (Morphine Sulfate) 4 mg 1X  ONCE IV ;  Start 3/16/20 at 03:00; 

Stop 3/16/20 at 03:01;  Status Cancel


Ketorolac Tromethamine (Toradol 30mg Vial) 30 mg 1X  ONCE IV  Last administered 

on 3/16/20at 02:54;  Start 3/16/20 at 03:00;  Stop 3/16/20 at 03:01;  Status DC


Fentanyl Citrate (Fentanyl 2ml Vial) 25 mcg 1X  ONCE IVP  Last administered on 

3/16/20at 03:23;  Start 3/16/20 at 03:30;  Stop 3/16/20 at 03:31;  Status DC


Fentanyl Citrate (Fentanyl 2ml Vial) 100 mcg STK-MED ONCE .ROUTE ;  Start 

3/16/20 at 03:18;  Stop 3/16/20 at 03:18;  Status DC


Iohexol (Omnipaque 350 Mg/ml) 90 ml 1X  ONCE IV  Last administered on 3/16/20at 

03:25;  Start 3/16/20 at 03:30;  Stop 3/16/20 at 03:31;  Status DC


Info (CONTRAST GIVEN -- Rx MONITORING) 1 each PRN DAILY  PRN MC SEE COMMENTS;  

Start 3/16/20 at 03:30;  Stop 3/18/20 at 03:29;  Status DC


Hydromorphone HCl (Dilaudid) 0.5 mg 1X  ONCE IV  Last administered on 3/16/20at 

03:55;  Start 3/16/20 at 04:30;  Stop 3/16/20 at 04:32;  Status DC


Ondansetron HCl (Zofran) 4 mg PRN Q8HRS  PRN IV NAUSEA/VOMITING 1ST CHOICE;  

Start 3/16/20 at 05:00;  Stop 3/16/20 at 09:27;  Status DC


Morphine Sulfate (Morphine Sulfate) 2 mg PRN Q2HR  PRN IV SEVERE PAIN 7-10 Last 

administered on 3/17/20at 12:26;  Start 3/16/20 at 05:00;  Stop 3/17/20 at 

14:15;  Status DC


Sodium Chloride 1,000 ml @  125 mls/hr Q8H IV  Last administered on 3/16/20at 

20:56;  Start 3/16/20 at 05:00;  Stop 3/17/20 at 04:59;  Status DC


Hydromorphone HCl (Dilaudid) 0.5 mg PRN Q3HRS  PRN IV SEVERE PAIN 7-10 Last 

administered on 3/17/20at 10:06;  Start 3/16/20 at 05:00;  Stop 3/17/20 at 12

:01;  Status DC


Piperacillin Sod/ Tazobactam Sod 4.5 gm/Sodium Chloride 100 ml @  200 mls/hr 1X 

ONCE IV  Last administered on 3/16/20at 05:44;  Start 3/16/20 at 06:00;  Stop 

3/16/20 at 06:29;  Status DC


Ondansetron HCl (Zofran) 4 mg PRN Q4HRS  PRN IV NAUSEA/VOMITING 1ST CHOICE Last 

administered on 3/21/20at 16:15;  Start 3/16/20 at 09:30


Insulin Human Lispro (HumaLOG) 0-9 UNITS Q6HRS SQ  Last administered on 4/1/20at

11:32;  Start 3/16/20 at 09:30


Dextrose (Dextrose 50%-Water Syringe) 12.5 gm PRN Q15MIN  PRN IV SEE COMMENTS;  

Start 3/16/20 at 09:30


Pantoprazole Sodium (PROTONIX VIAL for IV PUSH) 40 mg DAILYAC IVP  Last 

administered on 4/1/20at 08:15;  Start 3/16/20 at 11:30


Prochlorperazine Edisylate (Compazine) 10 mg PRN Q6HRS  PRN IV NAUSEA/VOMITING, 

2nd CHOICE Last administered on 3/17/20at 00:42;  Start 3/16/20 at 17:45


Atenolol (Tenormin) 100 mg DAILY PO ;  Start 3/17/20 at 09:00;  Stop 3/16/20 at 

20:08;  Status DC


Metoprolol Tartrate (Lopressor Vial) 2.5 mg Q6HRS IVP  Last administered on 

3/17/20at 05:51;  Start 3/16/20 at 20:15;  Stop 3/17/20 at 10:02;  Status DC


Metoprolol Tartrate (Lopressor Vial) 5 mg Q6HRS IVP  Last administered on 

3/26/20at 00:12;  Start 3/17/20 at 10:15;  Stop 3/28/20 at 08:48;  Status DC


Hydromorphone HCl (Dilaudid) 1 mg PRN Q3HRS  PRN IV SEVERE PAIN 7-10 Last 

administered on 3/23/20at 05:13;  Start 3/17/20 at 12:00;  Stop 3/31/20 at 

00:25;  Status DC


Lidocaine HCl (Buffered Lidocaine 1%) 3 ml STK-MED ONCE .ROUTE ;  Start 3/17/20 

at 12:55;  Stop 3/17/20 at 12:56;  Status DC


Albumin Human 500 ml @  125 mls/hr 1X  ONCE IV  Last administered on 3/17/20at 

14:33;  Start 3/17/20 at 14:30;  Stop 3/17/20 at 18:32;  Status DC


Norepinephrine Bitartrate 8 mg/ Dextrose 258 ml @  17.299 mls/ hr CONT  PRN IV 

PER PROTOCOL Last administered on 4/1/20at 05:56;  Start 3/17/20 at 15:30


Sodium Chloride 1,000 ml @  125 mls/hr Q8H IV  Last administered on 3/17/20at 

21:04;  Start 3/17/20 at 16:00;  Stop 3/18/20 at 02:42;  Status DC


Albumin Human 500 ml @  125 mls/hr PRN BID  PRN IV After every 2L NSS & BP < 

90mm Last administered on 4/1/20at 14:21;  Start 3/17/20 at 16:00


Iohexol (Omnipaque 300 Mg/ml) 60 ml 1X  ONCE IV  Last administered on 3/17/20at 

17:20;  Start 3/17/20 at 17:00;  Stop 3/17/20 at 17:01;  Status DC


Info (CONTRAST GIVEN -- Rx MONITORING) 1 each PRN DAILY  PRN MC SEE COMMENTS;  

Start 3/17/20 at 17:00;  Stop 3/19/20 at 16:59;  Status DC


Meropenem 1 gm/ Sodium Chloride 100 ml @  200 mls/hr Q8HRS IV  Last administered

on 3/18/20at 05:45;  Start 3/17/20 at 20:00;  Stop 3/18/20 at 08:48;  Status DC


Furosemide (Lasix) 40 mg 1X  ONCE IVP  Last administered on 3/17/20at 22:12;  

Start 3/17/20 at 22:30;  Stop 3/17/20 at 22:31;  Status DC


Calcium Chloride 1000 mg/Sodium Chloride 110 ml @  220 mls/hr 1X  ONCE IV  Last 

administered on 3/17/20at 22:11;  Start 3/17/20 at 22:30;  Stop 3/17/20 at 

22:59;  Status DC


Albuterol Sulfate (Ventolin Neb Soln) 2.5 mg 1X  ONCE NEB  Last administered on 

3/18/20at 00:56;  Start 3/17/20 at 22:30;  Stop 3/17/20 at 22:31;  Status DC


Insulin Human Regular (HumuLIN R VIAL) 5 unit 1X  ONCE IV  Last administered on 

3/17/20at 22:14;  Start 3/17/20 at 22:30;  Stop 3/17/20 at 22:31;  Status DC


Magnesium Sulfate 50 ml @ 25 mls/hr 1X  ONCE IV  Last administered on 3/18/20at 

02:57;  Start 3/18/20 at 03:00;  Stop 3/18/20 at 04:59;  Status DC


Calcium Gluconate 1000 mg/Sodium Chloride 110 ml @  220 mls/hr 1X  ONCE IV  Last

administered on 3/18/20at 02:46;  Start 3/18/20 at 03:00;  Stop 3/18/20 at 

03:29;  Status DC


Sodium Chloride 1,000 ml @  200 mls/hr Q5H IV  Last administered on 3/18/20at 

02:46;  Start 3/18/20 at 03:00;  Stop 3/18/20 at 10:21;  Status DC


Calcium Gluconate 1000 mg/Sodium Chloride 110 ml @  220 mls/hr 1X  ONCE IV  Last

administered on 3/18/20at 03:21;  Start 3/18/20 at 03:30;  Stop 3/18/20 at 

03:59;  Status DC


Sodium Bicarbonate 50 meq/Sodium Chloride 1,050 ml @  75 mls/hr Q14H IV  Last 

administered on 3/22/20at 21:10;  Start 3/18/20 at 07:30;  Stop 3/23/20 at 

10:28;  Status DC


Calcium Gluconate 2000 mg/Sodium Chloride 120 ml @  220 mls/hr 1X  ONCE IV  Last

administered on 3/18/20at 09:05;  Start 3/18/20 at 07:30;  Stop 3/18/20 at 

08:02;  Status DC


Lidocaine HCl (Xylocaine-Mpf 1% 2ml Vial) 2 ml STK-MED ONCE .ROUTE ;  Start 

3/18/20 at 08:47;  Stop 3/18/20 at 08:47;  Status DC


Meropenem 500 mg/ Sodium Chloride 50 ml @  100 mls/hr Q12HR IV  Last 

administered on 3/23/20at 21:01;  Start 3/18/20 at 18:00;  Stop 3/24/20 at 

07:58;  Status DC


Lidocaine HCl (Buffered Lidocaine 1%) 3 ml STK-MED ONCE .ROUTE ;  Start 3/18/20 

at 09:46;  Stop 3/18/20 at 09:46;  Status DC


Lidocaine HCl (Buffered Lidocaine 1%) 6 ml 1X  ONCE INJ  Last administered on 

3/18/20at 10:26;  Start 3/18/20 at 10:15;  Stop 3/18/20 at 10:16;  Status DC


Info (Tpn Per Pharmacy) 1 each PRN DAILY  PRN MC SEE COMMENTS Last administered 

on 4/1/20at 13:02;  Start 3/18/20 at 12:00


Sodium Chloride 1,000 ml @  1,000 mls/hr Q1H PRN IV hypotension;  Start 3/18/20 

at 12:07;  Stop 3/18/20 at 18:06;  Status DC


Diphenhydramine HCl (Benadryl) 25 mg 1X PRN  PRN IV ITCHING;  Start 3/18/20 at 

12:15;  Stop 3/19/20 at 12:14;  Status DC


Diphenhydramine HCl (Benadryl) 25 mg 1X PRN  PRN IV ITCHING;  Start 3/18/20 at 

12:15;  Stop 3/19/20 at 12:14;  Status DC


Sodium Chloride 1,000 ml @  400 mls/hr Q2H30M PRN IV PATENCY;  Start 3/18/20 at 

12:07;  Stop 3/19/20 at 00:06;  Status DC


Info (PHARMACY MONITORING -- do not chart) 1 each PRN DAILY  PRN MC SEE 

COMMENTS;  Start 3/18/20 at 12:15;  Stop 3/20/20 at 08:13;  Status DC


Sodium Chloride 90 meq/Calcium Gluconate 10 meq/ Multivitamins 10 ml/Chromium/ 

Copper/Manganese/ Seleni/Zn 1 ml/ Total Parenteral Nutrition/Amino 

Acids/Dextrose/ Fat Emulsion Intravenous 55.005 ml  @ 2.292 mls/hr TPN  CONT IV 

;  Start 3/18/20 at 22:00;  Stop 3/18/20 at 12:33;  Status DC


Info (Tpn Per Pharmacy) 1 each PRN DAILY  PRN MC SEE COMMENTS;  Start 3/18/20 at

12:30;  Status UNV


Sodium Chloride 90 meq/Calcium Gluconate 10 meq/ Multivitamins 10 ml/Chromium/ 

Copper/Manganese/ Seleni/Zn 0.5 ml/ Total Parenteral Nutrition/Amino 

Acids/Dextrose/ Fat Emulsion Intravenous 1,512 ml @  63 mls/hr TPN  CONT IV  

Last administered on 3/18/20at 22:06;  Start 3/18/20 at 22:00;  Stop 3/19/20 at 

21:59;  Status DC


Calcium Carbonate/ Glycine (Tums) 500 mg PRN AFTMEALHC  PRN PO INDIGESTION;  

Start 3/18/20 at 17:45


Calcium Gluconate (Calcium Gluconate) 2,000 mg 1X  ONCE IVP  Last administered 

on 3/19/20at 02:19;  Start 3/19/20 at 02:15;  Stop 3/19/20 at 02:16;  Status DC


Calcium Chloride 3000 mg/Sodium Chloride 1,030 ml @  50 mls/hr [id number] IV  Last 

administered on 3/21/20at 02:17;  Start 3/19/20 at 08:00;  Stop 3/21/20 at 

15:23;  Status DC


Lorazepam (Ativan Inj) 1 mg PRN Q4HRS  PRN IVP ANXIETY / AGITATION Last 

administered on 3/23/20at 00:34;  Start 3/19/20 at 09:00


Sodium Chloride 1,000 ml @  1,000 mls/hr Q1H PRN IV hypotension;  Start 3/19/20 

at 08:56;  Stop 3/19/20 at 14:55;  Status DC


Albumin Human 200 ml @  200 mls/hr 1X PRN  PRN IV Hypotension;  Start 3/19/20 at

09:00;  Stop 3/19/20 at 14:59;  Status DC


Diphenhydramine HCl (Benadryl) 25 mg 1X PRN  PRN IV ITCHING;  Start 3/19/20 at 

09:00;  Stop 3/20/20 at 08:59;  Status DC


Diphenhydramine HCl (Benadryl) 25 mg 1X PRN  PRN IV ITCHING;  Start 3/19/20 at 

09:00;  Stop 3/20/20 at 08:59;  Status DC


Sodium Chloride 1,000 ml @  400 mls/hr Q2H30M PRN IV PATENCY;  Start 3/19/20 at 

08:56;  Stop 3/19/20 at 20:55;  Status DC


Info (PHARMACY MONITORING -- do not chart) 1 each PRN DAILY  PRN MC SEE 

COMMENTS;  Start 3/19/20 at 09:00;  Status UNV


Info (PHARMACY MONITORING -- do not chart) 1 each PRN DAILY  PRN MC SEE 

COMMENTS;  Start 3/19/20 at 09:00;  Stop 3/20/20 at 08:13;  Status DC


Digoxin (Lanoxin) 500 mcg 1X  ONCE IV  Last administered on 3/19/20at 10:04;  

Start 3/19/20 at 10:00;  Stop 3/19/20 at 10:01;  Status DC


Digoxin (Lanoxin) 125 mcg 1X  ONCE IV  Last administered on 3/19/20at 17:10;  

Start 3/19/20 at 18:00;  Stop 3/19/20 at 18:01;  Status DC


Magnesium Sulfate 100 ml @  25 mls/hr 1X  ONCE IV  Last administered on 

3/19/20at 12:48;  Start 3/19/20 at 13:00;  Stop 3/19/20 at 16:59;  Status DC


Sodium Chloride 90 meq/Magnesium Sulfate 10 meq/ Calcium Gluconate 20 meq/ 

Multivitamins 10 ml/Chromium/ Copper/Manganese/ Seleni/Zn 0.5 ml/ Total 

Parenteral Nutrition/Amino Acids/Dextrose/ Fat Emulsion Intravenous 1,512 ml @  

63 mls/hr TPN  CONT IV  Last administered on 3/19/20at 22:25;  Start 3/19/20 at 

22:00;  Stop 3/20/20 at 21:59;  Status DC


Sodium Chloride 1,000 ml @  1,000 mls/hr Q1H PRN IV hypotension;  Start 3/20/20 

at 08:05;  Stop 3/20/20 at 14:04;  Status DC


Albumin Human 200 ml @  200 mls/hr 1X  ONCE IV  Last administered on 3/20/20at 

08:57;  Start 3/20/20 at 08:15;  Stop 3/20/20 at 09:14;  Status DC


Diphenhydramine HCl (Benadryl) 25 mg 1X PRN  PRN IV ITCHING;  Start 3/20/20 at 

08:15;  Stop 3/21/20 at 08:14;  Status DC


Diphenhydramine HCl (Benadryl) 25 mg 1X PRN  PRN IV ITCHING;  Start 3/20/20 at 

08:15;  Stop 3/21/20 at 08:14;  Status DC


Sodium Chloride 1,000 ml @  400 mls/hr Q2H30M PRN IV PATENCY;  Start 3/20/20 at 

08:05;  Stop 3/20/20 at 20:04;  Status DC


Info (PHARMACY MONITORING -- do not chart) 1 each PRN DAILY  PRN MC SEE 

COMMENTS;  Start 3/20/20 at 08:15;  Stop 3/24/20 at 07:57;  Status DC


Sodium Chloride 90 meq/Potassium Chloride 15 meq/ Potassium Phosphate 10 mmol/ 

Magnesium Sulfate 10 meq/Calcium Gluconate 20 meq/ Multivitamins 10 ml/Chromium/

Copper/Manganese/ Seleni/Zn 0.5 ml/ Total Parenteral Nutrition/Amino 

Acids/Dextrose/ Fat Emulsion Intravenous 1,512 ml @  63 mls/hr TPN  CONT IV  

Last administered on 3/20/20at 21:01;  Start 3/20/20 at 22:00;  Stop 3/21/20 at 

21:59;  Status DC


Potassium Chloride/Water 100 ml @  100 mls/hr 1X  ONCE IV  Last administered on 

3/20/20at 14:09;  Start 3/20/20 at 14:00;  Stop 3/20/20 at 14:59;  Status DC


Benzocaine (Hurricaine One) 1 spray 1X  ONCE MM  Last administered on 3/20/20at 

16:38;  Start 3/20/20 at 14:30;  Stop 3/20/20 at 14:31;  Status DC


Lidocaine HCl (Glydo (Lidocaine) Jelly) 1 thomas 1X  ONCE MM  Last administered on 

3/20/20at 16:38;  Start 3/20/20 at 14:30;  Stop 3/20/20 at 14:31;  Status DC


Linezolid/Dextrose 300 ml @  300 mls/hr Q12HR IV  Last administered on 3/26/20at

21:04;  Start 3/20/20 at 20:00;  Stop 3/27/20 at 07:50;  Status DC


Acetaminophen (Tylenol) 650 mg PRN Q6HRS  PRN PO MILD PAIN / TEMP;  Start 

3/21/20 at 03:30;  Stop 3/21/20 at 03:36;  Status DC


Acetaminophen (Tylenol) 650 mg PRN Q6HRS  PRN PEG MILD PAIN / TEMP Last 

administered on 3/26/20at 07:35;  Start 3/21/20 at 03:36


Sodium Chloride 1,000 ml @  1,000 mls/hr Q1H PRN IV hypotension;  Start 3/21/20 

at 07:50;  Stop 3/21/20 at 13:49;  Status DC


Albumin Human 200 ml @  200 mls/hr 1X PRN  PRN IV Hypotension;  Start 3/21/20 at

08:00;  Stop 3/21/20 at 13:59;  Status DC


Sodium Chloride (Normal Saline Flush) 10 ml 1X PRN  PRN IV AP catheter pack;  

Start 3/21/20 at 08:00;  Stop 3/22/20 at 07:59;  Status DC


Sodium Chloride (Normal Saline Flush) 10 ml 1X PRN  PRN IV  catheter pack;  

Start 3/21/20 at 08:00;  Stop 3/22/20 at 07:59;  Status DC


Sodium Chloride 1,000 ml @  400 mls/hr Q2H30M PRN IV PATENCY;  Start 3/21/20 at 

07:50;  Stop 3/21/20 at 19:49;  Status DC


Info (PHARMACY MONITORING -- do not chart) 1 each PRN DAILY  PRN MC SEE 

COMMENTS;  Start 3/21/20 at 08:00;  Status UNV


Info (PHARMACY MONITORING -- do not chart) 1 each PRN DAILY  PRN MC SEE 

COMMENTS;  Start 3/21/20 at 08:00;  Stop 3/23/20 at 08:25;  Status DC


Sodium Chloride 90 meq/Potassium Chloride 15 meq/ Potassium Phosphate 10 mmol/ 

Magnesium Sulfate 10 meq/Calcium Gluconate 20 meq/ Multivitamins 10 ml/Chromium/

Copper/Manganese/ Seleni/Zn 0.5 ml/ Total Parenteral Nutrition/Amino 

Acids/Dextrose/ Fat Emulsion Intravenous 1,512 ml @  63 mls/hr TPN  CONT IV  

Last administered on 3/21/20at 20:57;  Start 3/21/20 at 22:00;  Stop 3/22/20 at 

21:59;  Status DC


Sodium Chloride 90 meq/Potassium Chloride 15 meq/ Potassium Phosphate 15 mmol/ 

Magnesium Sulfate 10 meq/Calcium Gluconate 20 meq/ Multivitamins 10 ml/Chromium/

Copper/Manganese/ Seleni/Zn 0.5 ml/ Total Parenteral Nutrition/Amino 

Acids/Dextrose/ Fat Emulsion Intravenous 1,512 ml @  63 mls/hr TPN  CONT IV ;  

Start 3/22/20 at 22:00;  Stop 3/22/20 at 14:16;  Status DC


Sodium Chloride 90 meq/Potassium Chloride 15 meq/ Potassium Phosphate 15 mmol/ 

Magnesium Sulfate 10 meq/Calcium Gluconate 20 meq/ Multivitamins 10 ml/Chromium/

Copper/Manganese/ Seleni/Zn 0.5 ml/ Total Parenteral Nutrition/Amino 

Acids/Dextrose/ Fat Emulsion Intravenous 1,200 ml @  50 mls/hr TPN  CONT IV ;  

Start 3/22/20 at 22:00;  Stop 3/22/20 at 14:17;  Status DC


Sodium Chloride 90 meq/Potassium Chloride 15 meq/ Potassium Phosphate 10 mmol/ 

Magnesium Sulfate 10 meq/Calcium Gluconate 20 meq/ Multivitamins 10 ml/Chromium/

Copper/Manganese/ Seleni/Zn 0.5 ml/ Total Parenteral Nutrition/Amino 

Acids/Dextrose/ Fat Emulsion Intravenous 1,200 ml @  50 mls/hr TPN  CONT IV  

Last administered on 3/22/20at 23:29;  Start 3/22/20 at 22:00;  Stop 3/23/20 at 

21:59;  Status DC


Sodium Chloride 1,000 ml @  1,000 mls/hr Q1H PRN IV hypotension;  Start 3/23/20 

at 07:28;  Stop 3/23/20 at 13:27;  Status DC


Albumin Human 200 ml @  200 mls/hr 1X  ONCE IV  Last administered on 3/23/20at 

08:51;  Start 3/23/20 at 07:30;  Stop 3/23/20 at 08:29;  Status DC


Diphenhydramine HCl (Benadryl) 25 mg 1X PRN  PRN IV ITCHING;  Start 3/23/20 at 

07:30;  Stop 3/24/20 at 07:29;  Status DC


Diphenhydramine HCl (Benadryl) 25 mg 1X PRN  PRN IV ITCHING;  Start 3/23/20 at 

07:30;  Stop 3/24/20 at 07:29;  Status DC


Sodium Chloride 1,000 ml @  400 mls/hr Q2H30M PRN IV PATENCY;  Start 3/23/20 at 

07:28;  Stop 3/23/20 at 19:27;  Status DC


Info (PHARMACY MONITORING -- do not chart) 1 each PRN DAILY  PRN MC SEE 

COMMENTS;  Start 3/23/20 at 07:30


Metronidazole 100 ml @  100 mls/hr Q6HRS IV  Last administered on 4/1/20at 

11:32;  Start 3/23/20 at 08:30


Micafungin Sodium 100 mg/Dextrose 100 ml @  100 mls/hr Q24H IV  Last 

administered on 4/1/20at 08:30;  Start 3/23/20 at 09:00


Propofol 0 ml @ As Directed STK-MED ONCE IV ;  Start 3/23/20 at 07:53;  Stop 

3/23/20 at 07:53;  Status DC


Etomidate (Amidate) 20 mg STK-MED ONCE IV ;  Start 3/23/20 at 07:53;  Stop 

3/23/20 at 07:54;  Status DC


Midazolam HCl (Versed) 5 mg STK-MED ONCE .ROUTE ;  Start 3/23/20 at 07:57;  Stop

3/23/20 at 07:57;  Status DC


Fentanyl Citrate 30 ml @ 0 mls/hr CONT  PRN IV SEE PROTOCOL Last administered on

4/1/20at 09:22;  Start 3/23/20 at 08:15


Artificial Tears (Artificial Tears) 1 drop PRN Q1HR  PRN OU DRY EYE;  Start 

3/23/20 at 08:15


Midazolam HCl 50 mg/Sodium Chloride 50 ml @ 0 mls/hr CONT  PRN IV SEE PROTOCOL 

Last administered on 3/26/20at 22:39;  Start 3/23/20 at 08:15;  Stop 3/28/20 at 

15:59;  Status DC


Etomidate (Amidate) 8 mg 1X  ONCE IV  Last administered on 3/23/20at 08:33;  

Start 3/23/20 at 08:30;  Stop 3/23/20 at 08:31;  Status DC


Succinylcholine Chloride (Anectine) 120 mg 1X  ONCE IV  Last administered on 

3/23/20at 08:34;  Start 3/23/20 at 08:30;  Stop 3/23/20 at 08:31;  Status DC


Midazolam HCl (Versed) 5 mg 1X  ONCE IV ;  Start 3/23/20 at 08:30;  Stop 3/23/20

at 08:31;  Status DC


Potassium Chloride 15 meq/ Bicarbonate Dialysis Soln w/ out KCl 5,007.5 ml  @ 

1,000 mls/ hr Q5H1M IV  Last administered on 3/24/20at 11:11;  Start 3/23/20 at 

12:00;  Stop 3/24/20 at 11:15;  Status DC


Potassium Chloride 15 meq/ Bicarbonate Dialysis Soln w/ out KCl 5,007.5 ml  @ 

1,000 mls/ hr Q5H1M IV  Last administered on 3/24/20at 11:12;  Start 3/23/20 at 

12:00;  Stop 3/24/20 at 11:17;  Status DC


Potassium Chloride 15 meq/ Bicarbonate Dialysis Soln w/ out KCl 5,007.5 ml  @ 

1,000 mls/ hr Q5H1M IV  Last administered on 3/24/20at 11:11;  Start 3/23/20 at 

12:00;  Stop 3/24/20 at 11:19;  Status DC


Sodium Chloride 90 meq/Potassium Chloride 15 meq/ Potassium Phosphate 10 mmol/ 

Magnesium Sulfate 10 meq/Calcium Gluconate 20 meq/ Multivitamins 10 ml/Chromium/

Copper/Manganese/ Seleni/Zn 0.5 ml/ Total Parenteral Nutrition/Amino 

Acids/Dextrose/ Fat Emulsion Intravenous 1,400 ml @  58.333 mls/ hr TPN  CONT IV

 Last administered on 3/23/20at 21:42;  Start 3/23/20 at 22:00;  Stop 3/24/20 at

21:59;  Status DC


Heparin Sodium (Porcine) (Heparin Sodium) 5,000 unit Q8HRS SQ  Last administered

on 3/28/20at 05:55;  Start 3/23/20 at 15:00;  Stop 3/28/20 at 13:28;  Status DC


Meropenem 500 mg/ Sodium Chloride 50 ml @  100 mls/hr Q6HRS IV  Last 

administered on 3/25/20at 06:00;  Start 3/24/20 at 09:00;  Stop 3/25/20 at 

07:29;  Status DC


Potassium Phosphate 20 mmol/ Sodium Chloride 106.6667 ml @  51.667 m... 1X  ONCE

IV  Last administered on 3/24/20at 11:22;  Start 3/24/20 at 10:15;  Stop 3/24/20

at 12:18;  Status DC


Acetaminophen (Tylenol Supp) 650 mg PRN Q6HRS  PRN MT MILD PAIN / TEMP Last 

administered on 3/24/20at 10:37;  Start 3/24/20 at 10:30


Potassium Chloride/Water 100 ml @  100 mls/hr Q1H IV  Last administered on 

3/24/20at 12:12;  Start 3/24/20 at 11:00;  Stop 3/24/20 at 12:59;  Status DC


Potassium Chloride 20 meq/ Bicarbonate Dialysis Soln w/ out KCl 5,010 ml @  

1,000 mls/hr Q5H1M IV  Last administered on 3/25/20at 08:48;  Start 3/24/20 at 

12:00;  Stop 3/25/20 at 13:03;  Status DC


Potassium Chloride 20 meq/ Bicarbonate Dialysis Soln w/ out KCl 5,010 ml @  

1,000 mls/hr Q5H1M IV  Last administered on 3/29/20at 14:52;  Start 3/24/20 at 

11:30;  Stop 3/29/20 at 19:59;  Status DC


Potassium Chloride 20 meq/ Bicarbonate Dialysis Soln w/ out KCl 5,010 ml @  

1,000 mls/hr Q5H1M IV  Last administered on 3/29/20at 14:53;  Start 3/24/20 at 

11:30;  Stop 3/29/20 at 19:59;  Status DC


Sodium Chloride 90 meq/Potassium Chloride 15 meq/ Potassium Phosphate 15 mmol/ 

Magnesium Sulfate 10 meq/Calcium Gluconate 15 meq/ Multivitamins 10 ml/Chromium/

Copper/Manganese/ Seleni/Zn 0.5 ml/ Total Parenteral Nutrition/Amino 

Acids/Dextrose/ Fat Emulsion Intravenous 1,400 ml @  58.333 mls/ hr TPN  CONT IV

 Last administered on 3/24/20at 22:17;  Start 3/24/20 at 22:00;  Stop 3/25/20 at

21:59;  Status DC


Cefepime HCl (Maxipime) 2 gm Q12HR IVP  Last administered on 4/1/20at 08:30;  

Start 3/25/20 at 09:00


Daptomycin 500 mg/ Sodium Chloride 50 ml @  100 mls/hr Q48H IV  Last 

administered on 3/31/20at 09:14;  Start 3/25/20 at 08:30


Lidocaine HCl (Buffered Lidocaine 1%) 3 ml 1X  ONCE INJ  Last administered on 

3/25/20at 10:27;  Start 3/25/20 at 10:30;  Stop 3/25/20 at 10:31;  Status DC


Potassium Phosphate 20 mmol/ Sodium Chloride 106.6667 ml @  51.667 m... 1X  ONCE

IV  Last administered on 3/25/20at 12:51;  Start 3/25/20 at 13:00;  Stop 3/25/20

at 15:03;  Status DC


Sodium Chloride 90 meq/Potassium Chloride 15 meq/ Potassium Phosphate 18 mmol/ 

Magnesium Sulfate 8 meq/Calcium Gluconate 15 meq/ Multivitamins 10 ml/Chromium/ 

Copper/Manganese/ Seleni/Zn 0.5 ml/ Total Parenteral Nutrition/Amino 

Acids/Dextrose/ Fat Emulsion Intravenous 1,400 ml @  58.333 mls/ hr TPN  CONT IV

 Last administered on 3/25/20at 22:16;  Start 3/25/20 at 22:00;  Stop 3/26/20 at

21:59;  Status DC


Potassium Chloride 20 meq/ Bicarbonate Dialysis Soln w/ out KCl 5,010 ml @  

1,000 mls/hr Q5H1M IV  Last administered on 3/29/20at 14:54;  Start 3/25/20 at 

16:00;  Stop 3/29/20 at 19:59;  Status DC


Multi-Ingred Cream/Lotion/Oil/ Oint (Artificial Tears Eye Ointment) 1 thomas PRN 

Q1HR  PRN OU DRY EYE Last administered on 3/30/20at 08:05;  Start 3/25/20 at 

17:30


Sodium Chloride 90 meq/Potassium Chloride 15 meq/ Potassium Phosphate 18 mmol/ 

Magnesium Sulfate 8 meq/Calcium Gluconate 15 meq/ Multivitamins 10 ml/Chromium/ 

Copper/Manganese/ Seleni/Zn 0.5 ml/ Total Parenteral Nutrition/Amino 

Acids/Dextrose/ Fat Emulsion Intravenous 1,400 ml @  58.333 mls/ hr TPN  CONT IV

 Last administered on 3/26/20at 22:00;  Start 3/26/20 at 22:00;  Stop 3/27/20 at

21:59;  Status DC


Albumin Human 500 ml @  125 mls/hr 1X  ONCE IV ;  Start 3/26/20 at 14:15;  Stop 

3/26/20 at 18:14;  Status DC


Sodium Chloride 90 meq/Potassium Chloride 15 meq/ Potassium Phosphate 18 mmol/ 

Magnesium Sulfate 8 meq/Calcium Gluconate 15 meq/ Multivitamins 10 ml/Chromium/ 

Copper/Manganese/ Seleni/Zn 0.5 ml/ Insulin Human Regular 10 unit/ Total 

Parenteral Nutrition/Amino Acids/Dextrose/ Fat Emulsion Intravenous 1,400 ml @  

58.333 mls/ hr TPN  CONT IV  Last administered on 3/27/20at 21:43;  Start 

3/27/20 at 22:00;  Stop 3/28/20 at 21:59;  Status DC


Lidocaine HCl (Buffered Lidocaine 1%) 3 ml STK-MED ONCE .ROUTE ;  Start 3/25/20 

at 10:00;  Stop 3/27/20 at 13:57;  Status DC


Midazolam HCl 100 mg/Sodium Chloride 100 ml @ 7 mls/hr CONT  PRN IV SEE PROTOCOL

Last administered on 4/1/20at 13:57;  Start 3/28/20 at 16:00


Sodium Chloride 90 meq/Potassium Chloride 15 meq/ Potassium Phosphate 18 mmol/ 

Magnesium Sulfate 8 meq/Calcium Gluconate 15 meq/ Multivitamins 10 ml/Chromium/ 

Copper/Manganese/ Seleni/Zn 0.5 ml/ Insulin Human Regular 15 unit/ Total 

Parenteral Nutrition/Amino Acids/Dextrose/ Fat Emulsion Intravenous 1,400 ml @  

58.333 mls/ hr TPN  CONT IV  Last administered on 3/28/20at 20:34;  Start 

3/28/20 at 22:00;  Stop 3/29/20 at 21:59;  Status DC


Info ([facility] Electrolyte Protocol) 1 ea CONT PRN  PRN MC PER PROTOCOL;  Start 

3/29/20 at 13:15


Sodium Chloride 90 meq/Potassium Chloride 15 meq/ Potassium Phosphate 18 mmol/ 

Magnesium Sulfate 8 meq/Calcium Gluconate 15 meq/ Multivitamins 10 ml/Chromium/ 

Copper/Manganese/ Seleni/Zn 0.5 ml/ Insulin Human Regular 15 unit/ Total 

Parenteral Nutrition/Amino Acids/Dextrose/ Fat Emulsion Intravenous 1,400 ml @  

58.333 mls/ hr TPN  CONT IV  Last administered on 3/29/20at 22:05;  Start 

3/29/20 at 22:00;  Stop 3/30/20 at 21:59;  Status DC


Potassium Chloride 15 meq/ Bicarbonate Dialysis Soln w/ out KCl 5,007.5 ml  @ 

1,000 mls/ hr Q5H1M IV  Last administered on 4/1/20at 13:58;  Start 3/29/20 at 

20:00


Potassium Chloride 15 meq/ Bicarbonate Dialysis Soln w/ out KCl 5,007.5 ml  @ 

1,000 mls/ hr Q5H1M IV  Last administered on 4/1/20at 13:58;  Start 3/29/20 at 

20:00


Potassium Chloride 15 meq/ Bicarbonate Dialysis Soln w/ out KCl 5,007.5 ml  @ 

1,000 mls/ hr Q5H1M IV  Last administered on 4/1/20at 13:58;  Start 3/29/20 at 

20:00


Iohexol (Omnipaque 240 Mg/ml) 30 ml 1X  ONCE PO  Last administered on 3/30/20at 

11:30;  Start 3/30/20 at 11:30;  Stop 3/30/20 at 11:33;  Status DC


Info (CONTRAST GIVEN -- Rx MONITORING) 1 each PRN DAILY  PRN MC SEE COMMENTS;  

Start 3/30/20 at 11:45;  Stop 4/1/20 at 11:44;  Status DC


Sodium Chloride 90 meq/Potassium Chloride 15 meq/ Potassium Phosphate 18 mmol/ 

Magnesium Sulfate 8 meq/Calcium Gluconate 15 meq/ Multivitamins 10 ml/Chromium/ 

Copper/Manganese/ Seleni/Zn 0.5 ml/ Insulin Human Regular 15 unit/ Total 

Parenteral Nutrition/Amino Acids/Dextrose/ Fat Emulsion Intravenous 1,400 ml @  

58.333 mls/ hr TPN  CONT IV  Last administered on 3/30/20at 21:47;  Start 

3/30/20 at 22:00;  Stop 3/31/20 at 21:59;  Status DC


Sodium Chloride 90 meq/Potassium Chloride 15 meq/ Potassium Phosphate 18 mmol/ 

Magnesium Sulfate 8 meq/Calcium Gluconate 15 meq/ Multivitamins 10 ml/Chromium/ 

Copper/Manganese/ Seleni/Zn 0.5 ml/ Insulin Human Regular 20 unit/ Total 

Parenteral Nutrition/Amino Acids/Dextrose/ Fat Emulsion Intravenous 1,400 ml @  

58.333 mls/ hr TPN  CONT IV  Last administered on 3/31/20at 21:36;  Start 

3/31/20 at 22:00;  Stop 4/1/20 at 21:59


Alteplase, Recombinant (Cathflo For Central Catheter Clearance) 1 mg 1X  ONCE 

INT CAT  Last administered on 3/31/20at 20:03;  Start 3/31/20 at 19:30;  Stop 

3/31/20 at 19:46;  Status DC


Alteplase, Recombinant (Cathflo For Central Catheter Clearance) 1 mg 1X  ONCE 

INT CAT  Last administered on 3/31/20at 22:05;  Start 3/31/20 at 22:00;  Stop 

3/31/20 at 22:01;  Status DC


Sodium Chloride 90 meq/Potassium Chloride 15 meq/ Potassium Phosphate 18 mmol/ 

Magnesium Sulfate 8 meq/Calcium Gluconate 15 meq/ Multivitamins 10 ml/Chromium/ 

Copper/Manganese/ Seleni/Zn 0.5 ml/ Insulin Human Regular 20 unit/ Total Paren

teral Nutrition/Amino Acids/Dextrose/ Fat Emulsion Intravenous 1,400 ml @  

58.333 mls/ hr TPN  CONT IV ;  Start 4/1/20 at 22:00;  Stop 4/2/20 at 21:59





Active Scripts


Active


Reported


Bisoprolol Fumarate 5 Mg Tablet 10 Mg PO DAILY


Vitals/I & O





Vital Sign - Last 24 Hours








 3/31/20 3/31/20 3/31/20 3/31/20





 17:00 18:00 19:00 19:30


 


Pulse 104 111 105 


 


Resp 25 25 25 


 


B/P (MAP) 106/71 (83) 121/79 (93) 129/53 (78) 


 


Pulse Ox 100 99 100 


 


O2 Delivery Ventilator Ventilator Ventilator Mechanical Ventilator





 3/31/20 3/31/20 3/31/20 3/31/20





 20:00 20:25 21:00 21:57


 


Temp 98.7   





 98.7   


 


Pulse 105  110 


 


Resp 25  25 


 


B/P (MAP) 129/53 (78)  133/85 (101) 


 


Pulse Ox 100 100 95 99


 


O2 Delivery Ventilator Ventilator Ventilator Ventilator


 


    





    





 3/31/20 3/31/20 3/31/20 3/31/20





 22:00 23:00 23:03 23:22


 


Pulse 106 102  


 


Resp 25 25  


 


B/P (MAP) 114/61 (78) 111/56 (74)  


 


Pulse Ox 100 100  100


 


O2 Delivery Ventilator Ventilator Ventilator Ventilator





 4/1/20 4/1/20 4/1/20 4/1/20





 00:00 00:00 01:00 02:00


 


Temp 99.1   





 99.1   


 


Pulse 108  102 102


 


Resp 25  25 25


 


B/P (MAP) 115/67 (83)  121/63 (82) 97/65 (76)


 


Pulse Ox 100  100 100


 


O2 Delivery Ventilator Mechanical Ventilator Ventilator Ventilator


 


    





    





 4/1/20 4/1/20 4/1/20 4/1/20





 03:00 03:30 03:40 04:00


 


Temp    98.5





    98.5


 


Pulse 100   101


 


Resp 25   25


 


B/P (MAP) 105/76 (86)   102/78 (86)


 


Pulse Ox 100  100 100


 


O2 Delivery Ventilator Mechanical Ventilator Ventilator Ventilator


 


    





    





 4/1/20 4/1/20 4/1/20 4/1/20





 05:00 06:00 07:00 07:38


 


Pulse 104 109 107 


 


Resp 25 25 25 


 


B/P (MAP) 100/59 (73) 107/63 (78) 103/65 (78) 


 


Pulse Ox 100 100 100 


 


O2 Delivery Ventilator Ventilator Ventilator Mechanical Ventilator





 4/1/20 4/1/20 4/1/20 4/1/20





 07:51 08:00 08:17 08:50


 


Temp  98.4  





  98.4  


 


Pulse  108  


 


Resp  25  


 


B/P (MAP)  118/57 (77)  


 


Pulse Ox 100 100 100 100


 


O2 Delivery Ventilator Ventilator Ventilator Ventilator


 


    





    





 4/1/20 4/1/20 4/1/20 4/1/20





 09:00 10:00 11:00 11:35


 


Pulse 106 107 109 


 


Resp 25 25 25 


 


B/P (MAP) 93/67 (76) 122/69 (86) 124/57 (79) 


 


Pulse Ox 100 100 100 


 


O2 Delivery Ventilator Ventilator Ventilator Mechanical Ventilator





 4/1/20 4/1/20 4/1/20 4/1/20





 12:00 12:35 13:00 14:00


 


Temp 100.1   





 100.1   


 


Pulse 106  103 99


 


Resp 25  25 25


 


B/P (MAP) 104/63 (77)  98/58 (71) 103/65 (78)


 


Pulse Ox 100 100 100 100


 


O2 Delivery Ventilator Ventilator Ventilator Ventilator


 


    





    





 4/1/20 4/1/20 4/1/20 





 15:00 15:41 16:00 


 


Temp   99.0 





   99.0 


 


Pulse 110  110 


 


Resp 25  25 


 


B/P (MAP) 93/65 (74)  106/64 (78) 


 


Pulse Ox 100  100 


 


O2 Delivery Ventilator Mechanical Ventilator Ventilator 














Intake and Output   


 


 3/31/20 3/31/20 4/1/20





 15:00 23:00 07:00


 


Intake Total 475 ml 1575 ml 1228 ml


 


Output Total 155 ml 245 ml 155 ml


 


Balance 320 ml 1330 ml 1073 ml











Hemodynamically unstable?:  Yes


Is patient in severe pain?:  No


Is NPO status required?:  Yes











HIRAM BARRERA MD              Apr 1, 2020 16:36

## 2020-04-01 NOTE — PDOC
PULMONARY PROGRESS NOTES


Subjective





Patient intubated on 3/23 , sedated


Currently on assist control ventilation  450 tidal volume 8 of PEEP , 40%FIO2 ON

CRRT


Vitals





Vital Signs








  Date Time  Temp Pulse Resp B/P (MAP) Pulse Ox O2 Delivery O2 Flow Rate FiO2


 


4/1/20 14:00  99 25 103/65 (78) 100 Ventilator  


 


4/1/20 12:00 100.1       





 100.1       


 


3/31/20 12:10       6.0 








Comments


intubated , sedated


Lungs:  Other (decrease bs)


Abdomen:  Other (distended)


Extremities:  Other (trace edema)


Labs





Laboratory Tests








Test


 3/30/20


14:30 3/30/20


17:32 3/30/20


23:33 3/31/20


00:30


 


Sodium Level


 137 mmol/L


(136-145) 


 


 137 mmol/L


(136-145)


 


Potassium Level


 4.4 mmol/L


(3.5-5.1) 


 


 4.1 mmol/L


(3.5-5.1)


 


Chloride Level


 103 mmol/L


() 


 


 104 mmol/L


()


 


Carbon Dioxide Level


 26 mmol/L


(21-32) 


 


 28 mmol/L


(21-32)


 


Anion Gap 8 (6-14)    5 (6-14) 


 


Blood Urea Nitrogen


 41 mg/dL


(7-20) 


 


 35 mg/dL


(7-20)


 


Creatinine


 1.6 mg/dL


(0.6-1.0) 


 


 1.3 mg/dL


(0.6-1.0)


 


Estimated GFR


(Cockcroft-Gault) 34.3 


 


 


 43.5 





 


Glucose Level


 244 mg/dL


(70-99) 


 


 193 mg/dL


(70-99)


 


Calcium Level


 8.1 mg/dL


(8.5-10.1) 


 


 7.9 mg/dL


(8.5-10.1)


 


Phosphorus Level


 3.0 mg/dL


(2.6-4.7) 


 


 2.9 mg/dL


(2.6-4.7)


 


Magnesium Level


 2.2 mg/dL


(1.8-2.4) 


 


 2.1 mg/dL


(1.8-2.4)


 


Glucose (Fingerstick)


 


 161 mg/dL


(70-99) 180 mg/dL


(70-99) 





 


Test


 3/31/20


05:30 3/31/20


05:43 3/31/20


08:00 3/31/20


12:00


 


White Blood Count


 35.5 x10^3/uL


(4.0-11.0) 


 


 





 


Red Blood Count


 1.92 x10^6/uL


(3.50-5.40) 


 


 





 


Hemoglobin


 6.5 g/dL


(12.0-15.5) 


 


 





 


Hematocrit


 19.4 %


(36.0-47.0) 


 


 





 


Mean Corpuscular Volume


 101 fL


() 


 


 





 


Mean Corpuscular Hemoglobin 34 pg (25-35)    


 


Mean Corpuscular Hemoglobin


Concent 33 g/dL


(31-37) 


 


 





 


Red Cell Distribution Width


 15.4 %


(11.5-14.5) 


 


 





 


Platelet Count


 354 x10^3/uL


(140-400) 


 


 





 


Neutrophils (%) (Auto) 82 % (31-73)    


 


Lymphocytes (%) (Auto) 12 % (24-48)    


 


Monocytes (%) (Auto) 5 % (0-9)    


 


Eosinophils (%) (Auto) 1 % (0-3)    


 


Basophils (%) (Auto) 1 % (0-3)    


 


Neutrophils # (Auto)


 29.1 x10^3/uL


(1.8-7.7) 


 


 





 


Lymphocytes # (Auto)


 4.1 x10^3/uL


(1.0-4.8) 


 


 





 


Monocytes # (Auto)


 1.7 x10^3/uL


(0.0-1.1) 


 


 





 


Eosinophils # (Auto)


 0.4 x10^3/uL


(0.0-0.7) 


 


 





 


Basophils # (Auto)


 0.2 x10^3/uL


(0.0-0.2) 


 


 





 


Sodium Level


 138 mmol/L


(136-145) 


 


 138 mmol/L


(136-145)


 


Potassium Level


 4.1 mmol/L


(3.5-5.1) 


 


 3.9 mmol/L


(3.5-5.1)


 


Chloride Level


 105 mmol/L


() 


 


 103 mmol/L


()


 


Carbon Dioxide Level


 27 mmol/L


(21-32) 


 


 27 mmol/L


(21-32)


 


Anion Gap 6 (6-14)    8 (6-14) 


 


Blood Urea Nitrogen


 34 mg/dL


(7-20) 


 


 33 mg/dL


(7-20)


 


Creatinine


 1.3 mg/dL


(0.6-1.0) 


 


 1.2 mg/dL


(0.6-1.0)


 


Estimated GFR


(Cockcroft-Gault) 43.5 


 


 


 47.7 





 


Glucose Level


 185 mg/dL


(70-99) 


 


 192 mg/dL


(70-99)


 


Calcium Level


 8.1 mg/dL


(8.5-10.1) 


 


 8.0 mg/dL


(8.5-10.1)


 


Phosphorus Level


 2.9 mg/dL


(2.6-4.7) 


 


 2.7 mg/dL


(2.6-4.7)


 


Magnesium Level


 2.4 mg/dL


(1.8-2.4) 


 


 2.2 mg/dL


(1.8-2.4)


 


Glucose (Fingerstick)


 


 194 mg/dL


(70-99) 


 





 


O2 Saturation   93 % (92-99)  


 


Arterial Blood pH


 


 


 7.34


(7.35-7.45) 





 


Arterial Blood pCO2 at


Patient Temp 


 


 47 mmHg


(35-46) 





 


Arterial Blood pO2 at Patient


Temp 


 


 73 mmHg


() 





 


Arterial Blood HCO3


 


 


 25 mmol/L


(21-28) 





 


Arterial Blood Base Excess


 


 


 -1 mmol/L


(-3-3) 





 


FiO2   40  


 


Prothrombin Time


 


 


 


 21.3 SEC


(11.7-14.0)


 


Prothromb Time International


Ratio 


 


 


 1.9 (0.8-1.1) 





 


Test


 3/31/20


17:30 3/31/20


17:40 4/1/20


00:10 4/1/20


00:15


 


White Blood Count


 37.4 x10^3/uL


(4.0-11.0) 


 


 





 


Red Blood Count


 2.89 x10^6/uL


(3.50-5.40) 


 


 





 


Hemoglobin


 9.3 g/dL


(12.0-15.5) 


 


 





 


Hematocrit


 28.0 %


(36.0-47.0) 


 


 





 


Mean Corpuscular Volume 97 fL ()    


 


Mean Corpuscular Hemoglobin 32 pg (25-35)    


 


Mean Corpuscular Hemoglobin


Concent 33 g/dL


(31-37) 


 


 





 


Red Cell Distribution Width


 17.1 %


(11.5-14.5) 


 


 





 


Platelet Count


 307 x10^3/uL


(140-400) 


 


 





 


Neutrophils (%) (Auto) 86 % (31-73)    


 


Lymphocytes (%) (Auto) 6 % (24-48)    


 


Monocytes (%) (Auto) 5 % (0-9)    


 


Eosinophils (%) (Auto) 2 % (0-3)    


 


Basophils (%) (Auto) 1 % (0-3)    


 


Neutrophils # (Auto)


 32.1 x10^3/uL


(1.8-7.7) 


 


 





 


Lymphocytes # (Auto)


 2.4 x10^3/uL


(1.0-4.8) 


 


 





 


Monocytes # (Auto)


 1.9 x10^3/uL


(0.0-1.1) 


 


 





 


Eosinophils # (Auto)


 0.8 x10^3/uL


(0.0-0.7) 


 


 





 


Basophils # (Auto)


 0.2 x10^3/uL


(0.0-0.2) 


 


 





 


Sodium Level


 


 138 mmol/L


(136-145) 


 137 mmol/L


(136-145)


 


Potassium Level


 


 4.1 mmol/L


(3.5-5.1) 


 4.1 mmol/L


(3.5-5.1)


 


Chloride Level


 


 104 mmol/L


() 


 103 mmol/L


()


 


Carbon Dioxide Level


 


 28 mmol/L


(21-32) 


 28 mmol/L


(21-32)


 


Anion Gap  6 (6-14)   6 (6-14) 


 


Blood Urea Nitrogen


 


 33 mg/dL


(7-20) 


 32 mg/dL


(7-20)


 


Creatinine


 


 1.2 mg/dL


(0.6-1.0) 


 1.1 mg/dL


(0.6-1.0)


 


Estimated GFR


(Cockcroft-Gault) 


 47.7 


 


 52.8 





 


BUN/Creatinine Ratio  28 (6-20)   


 


Glucose Level


 


 185 mg/dL


(70-99) 


 181 mg/dL


(70-99)


 


Calcium Level


 


 8.1 mg/dL


(8.5-10.1) 


 8.0 mg/dL


(8.5-10.1)


 


Phosphorus Level


 


 3.1 mg/dL


(2.6-4.7) 


 2.9 mg/dL


(2.6-4.7)


 


Magnesium Level


 


 2.3 mg/dL


(1.8-2.4) 


 2.1 mg/dL


(1.8-2.4)


 


Total Bilirubin


 


 1.0 mg/dL


(0.2-1.0) 


 





 


Aspartate Amino Transf


(AST/SGOT) 


 50 U/L (15-37) 


 


 





 


Alanine Aminotransferase


(ALT/SGPT) 


 21 U/L (14-59) 


 


 





 


Alkaline Phosphatase


 


 128 U/L


() 


 





 


Total Protein


 


 5.3 g/dL


(6.4-8.2) 


 





 


Albumin


 


 2.5 g/dL


(3.4-5.0) 


 





 


Albumin/Globulin Ratio  0.9 (1.0-1.7)   


 


Glucose (Fingerstick)


 


 


 170 mg/dL


(70-99) 





 


Test


 4/1/20


05:30 4/1/20


05:37 4/1/20


08:00 4/1/20


11:29


 


White Blood Count


 30.1 x10^3/uL


(4.0-11.0) 


 


 





 


Red Blood Count


 2.81 x10^6/uL


(3.50-5.40) 


 


 





 


Hemoglobin


 9.1 g/dL


(12.0-15.5) 


 


 





 


Hematocrit


 27.5 %


(36.0-47.0) 


 


 





 


Mean Corpuscular Volume 98 fL ()    


 


Mean Corpuscular Hemoglobin 32 pg (25-35)    


 


Mean Corpuscular Hemoglobin


Concent 33 g/dL


(31-37) 


 


 





 


Red Cell Distribution Width


 16.8 %


(11.5-14.5) 


 


 





 


Platelet Count


 281 x10^3/uL


(140-400) 


 


 





 


Neutrophils (%) (Auto) 81 % (31-73)    


 


Lymphocytes (%) (Auto) 10 % (24-48)    


 


Monocytes (%) (Auto) 6 % (0-9)    


 


Eosinophils (%) (Auto) 2 % (0-3)    


 


Basophils (%) (Auto) 1 % (0-3)    


 


Neutrophils # (Auto)


 24.4 x10^3/uL


(1.8-7.7) 


 


 





 


Lymphocytes # (Auto)


 3.0 x10^3/uL


(1.0-4.8) 


 


 





 


Monocytes # (Auto)


 1.7 x10^3/uL


(0.0-1.1) 


 


 





 


Eosinophils # (Auto)


 0.7 x10^3/uL


(0.0-0.7) 


 


 





 


Basophils # (Auto)


 0.2 x10^3/uL


(0.0-0.2) 


 


 





 


Sodium Level


 138 mmol/L


(136-145) 


 


 





 


Potassium Level


 4.0 mmol/L


(3.5-5.1) 


 


 





 


Chloride Level


 104 mmol/L


() 


 


 





 


Carbon Dioxide Level


 28 mmol/L


(21-32) 


 


 





 


Anion Gap 6 (6-14)    


 


Blood Urea Nitrogen


 31 mg/dL


(7-20) 


 


 





 


Creatinine


 1.1 mg/dL


(0.6-1.0) 


 


 





 


Estimated GFR


(Cockcroft-Gault) 52.8 


 


 


 





 


BUN/Creatinine Ratio 28 (6-20)    


 


Glucose Level


 177 mg/dL


(70-99) 


 


 





 


Calcium Level


 8.2 mg/dL


(8.5-10.1) 


 


 





 


Phosphorus Level


 2.9 mg/dL


(2.6-4.7) 


 


 





 


Magnesium Level


 2.3 mg/dL


(1.8-2.4) 


 


 





 


Total Bilirubin


 1.0 mg/dL


(0.2-1.0) 


 


 





 


Aspartate Amino Transf


(AST/SGOT) 46 U/L (15-37) 


 


 


 





 


Alanine Aminotransferase


(ALT/SGPT) 20 U/L (14-59) 


 


 


 





 


Alkaline Phosphatase


 119 U/L


() 


 


 





 


Total Protein


 5.2 g/dL


(6.4-8.2) 


 


 





 


Albumin


 2.4 g/dL


(3.4-5.0) 


 


 





 


Albumin/Globulin Ratio 0.9 (1.0-1.7)    


 


Glucose (Fingerstick)


 


 182 mg/dL


(70-99) 


 163 mg/dL


(70-99)


 


O2 Saturation   95 % (92-99)  


 


Arterial Blood pH


 


 


 7.34


(7.35-7.45) 





 


Arterial Blood pCO2 at


Patient Temp 


 


 41 mmHg


(35-46) 





 


Arterial Blood pO2 at Patient


Temp 


 


 83 mmHg


() 





 


Arterial Blood HCO3


 


 


 22 mmol/L


(21-28) 





 


Arterial Blood Base Excess


 


 


 -4 mmol/L


(-3-3) 





 


FiO2   30%  


 


Test


 4/1/20


11:30 


 


 





 


Sodium Level


 137 mmol/L


(136-145) 


 


 





 


Potassium Level


 3.7 mmol/L


(3.5-5.1) 


 


 





 


Chloride Level


 104 mmol/L


() 


 


 





 


Carbon Dioxide Level


 29 mmol/L


(21-32) 


 


 





 


Anion Gap 4 (6-14)    


 


Blood Urea Nitrogen


 31 mg/dL


(7-20) 


 


 





 


Creatinine


 1.1 mg/dL


(0.6-1.0) 


 


 





 


Estimated GFR


(Cockcroft-Gault) 52.8 


 


 


 





 


Glucose Level


 165 mg/dL


(70-99) 


 


 





 


Calcium Level


 7.2 mg/dL


(8.5-10.1) 


 


 





 


Phosphorus Level


 2.9 mg/dL


(2.6-4.7) 


 


 





 


Magnesium Level


 2.0 mg/dL


(1.8-2.4) 


 


 











Laboratory Tests








Test


 3/31/20


17:30 3/31/20


17:40 4/1/20


00:10 4/1/20


00:15


 


White Blood Count


 37.4 x10^3/uL


(4.0-11.0) 


 


 





 


Red Blood Count


 2.89 x10^6/uL


(3.50-5.40) 


 


 





 


Hemoglobin


 9.3 g/dL


(12.0-15.5) 


 


 





 


Hematocrit


 28.0 %


(36.0-47.0) 


 


 





 


Mean Corpuscular Volume 97 fL ()    


 


Mean Corpuscular Hemoglobin 32 pg (25-35)    


 


Mean Corpuscular Hemoglobin


Concent 33 g/dL


(31-37) 


 


 





 


Red Cell Distribution Width


 17.1 %


(11.5-14.5) 


 


 





 


Platelet Count


 307 x10^3/uL


(140-400) 


 


 





 


Neutrophils (%) (Auto) 86 % (31-73)    


 


Lymphocytes (%) (Auto) 6 % (24-48)    


 


Monocytes (%) (Auto) 5 % (0-9)    


 


Eosinophils (%) (Auto) 2 % (0-3)    


 


Basophils (%) (Auto) 1 % (0-3)    


 


Neutrophils # (Auto)


 32.1 x10^3/uL


(1.8-7.7) 


 


 





 


Lymphocytes # (Auto)


 2.4 x10^3/uL


(1.0-4.8) 


 


 





 


Monocytes # (Auto)


 1.9 x10^3/uL


(0.0-1.1) 


 


 





 


Eosinophils # (Auto)


 0.8 x10^3/uL


(0.0-0.7) 


 


 





 


Basophils # (Auto)


 0.2 x10^3/uL


(0.0-0.2) 


 


 





 


Sodium Level


 


 138 mmol/L


(136-145) 


 137 mmol/L


(136-145)


 


Potassium Level


 


 4.1 mmol/L


(3.5-5.1) 


 4.1 mmol/L


(3.5-5.1)


 


Chloride Level


 


 104 mmol/L


() 


 103 mmol/L


()


 


Carbon Dioxide Level


 


 28 mmol/L


(21-32) 


 28 mmol/L


(21-32)


 


Anion Gap  6 (6-14)   6 (6-14) 


 


Blood Urea Nitrogen


 


 33 mg/dL


(7-20) 


 32 mg/dL


(7-20)


 


Creatinine


 


 1.2 mg/dL


(0.6-1.0) 


 1.1 mg/dL


(0.6-1.0)


 


Estimated GFR


(Cockcroft-Gault) 


 47.7 


 


 52.8 





 


BUN/Creatinine Ratio  28 (6-20)   


 


Glucose Level


 


 185 mg/dL


(70-99) 


 181 mg/dL


(70-99)


 


Calcium Level


 


 8.1 mg/dL


(8.5-10.1) 


 8.0 mg/dL


(8.5-10.1)


 


Phosphorus Level


 


 3.1 mg/dL


(2.6-4.7) 


 2.9 mg/dL


(2.6-4.7)


 


Magnesium Level


 


 2.3 mg/dL


(1.8-2.4) 


 2.1 mg/dL


(1.8-2.4)


 


Total Bilirubin


 


 1.0 mg/dL


(0.2-1.0) 


 





 


Aspartate Amino Transf


(AST/SGOT) 


 50 U/L (15-37) 


 


 





 


Alanine Aminotransferase


(ALT/SGPT) 


 21 U/L (14-59) 


 


 





 


Alkaline Phosphatase


 


 128 U/L


() 


 





 


Total Protein


 


 5.3 g/dL


(6.4-8.2) 


 





 


Albumin


 


 2.5 g/dL


(3.4-5.0) 


 





 


Albumin/Globulin Ratio  0.9 (1.0-1.7)   


 


Glucose (Fingerstick)


 


 


 170 mg/dL


(70-99) 





 


Test


 4/1/20


05:30 4/1/20


05:37 4/1/20


08:00 4/1/20


11:29


 


White Blood Count


 30.1 x10^3/uL


(4.0-11.0) 


 


 





 


Red Blood Count


 2.81 x10^6/uL


(3.50-5.40) 


 


 





 


Hemoglobin


 9.1 g/dL


(12.0-15.5) 


 


 





 


Hematocrit


 27.5 %


(36.0-47.0) 


 


 





 


Mean Corpuscular Volume 98 fL ()    


 


Mean Corpuscular Hemoglobin 32 pg (25-35)    


 


Mean Corpuscular Hemoglobin


Concent 33 g/dL


(31-37) 


 


 





 


Red Cell Distribution Width


 16.8 %


(11.5-14.5) 


 


 





 


Platelet Count


 281 x10^3/uL


(140-400) 


 


 





 


Neutrophils (%) (Auto) 81 % (31-73)    


 


Lymphocytes (%) (Auto) 10 % (24-48)    


 


Monocytes (%) (Auto) 6 % (0-9)    


 


Eosinophils (%) (Auto) 2 % (0-3)    


 


Basophils (%) (Auto) 1 % (0-3)    


 


Neutrophils # (Auto)


 24.4 x10^3/uL


(1.8-7.7) 


 


 





 


Lymphocytes # (Auto)


 3.0 x10^3/uL


(1.0-4.8) 


 


 





 


Monocytes # (Auto)


 1.7 x10^3/uL


(0.0-1.1) 


 


 





 


Eosinophils # (Auto)


 0.7 x10^3/uL


(0.0-0.7) 


 


 





 


Basophils # (Auto)


 0.2 x10^3/uL


(0.0-0.2) 


 


 





 


Sodium Level


 138 mmol/L


(136-145) 


 


 





 


Potassium Level


 4.0 mmol/L


(3.5-5.1) 


 


 





 


Chloride Level


 104 mmol/L


() 


 


 





 


Carbon Dioxide Level


 28 mmol/L


(21-32) 


 


 





 


Anion Gap 6 (6-14)    


 


Blood Urea Nitrogen


 31 mg/dL


(7-20) 


 


 





 


Creatinine


 1.1 mg/dL


(0.6-1.0) 


 


 





 


Estimated GFR


(Cockcroft-Gault) 52.8 


 


 


 





 


BUN/Creatinine Ratio 28 (6-20)    


 


Glucose Level


 177 mg/dL


(70-99) 


 


 





 


Calcium Level


 8.2 mg/dL


(8.5-10.1) 


 


 





 


Phosphorus Level


 2.9 mg/dL


(2.6-4.7) 


 


 





 


Magnesium Level


 2.3 mg/dL


(1.8-2.4) 


 


 





 


Total Bilirubin


 1.0 mg/dL


(0.2-1.0) 


 


 





 


Aspartate Amino Transf


(AST/SGOT) 46 U/L (15-37) 


 


 


 





 


Alanine Aminotransferase


(ALT/SGPT) 20 U/L (14-59) 


 


 


 





 


Alkaline Phosphatase


 119 U/L


() 


 


 





 


Total Protein


 5.2 g/dL


(6.4-8.2) 


 


 





 


Albumin


 2.4 g/dL


(3.4-5.0) 


 


 





 


Albumin/Globulin Ratio 0.9 (1.0-1.7)    


 


Glucose (Fingerstick)


 


 182 mg/dL


(70-99) 


 163 mg/dL


(70-99)


 


O2 Saturation   95 % (92-99)  


 


Arterial Blood pH


 


 


 7.34


(7.35-7.45) 





 


Arterial Blood pCO2 at


Patient Temp 


 


 41 mmHg


(35-46) 





 


Arterial Blood pO2 at Patient


Temp 


 


 83 mmHg


() 





 


Arterial Blood HCO3


 


 


 22 mmol/L


(21-28) 





 


Arterial Blood Base Excess


 


 


 -4 mmol/L


(-3-3) 





 


FiO2   30%  


 


Test


 4/1/20


11:30 


 


 





 


Sodium Level


 137 mmol/L


(136-145) 


 


 





 


Potassium Level


 3.7 mmol/L


(3.5-5.1) 


 


 





 


Chloride Level


 104 mmol/L


() 


 


 





 


Carbon Dioxide Level


 29 mmol/L


(21-32) 


 


 





 


Anion Gap 4 (6-14)    


 


Blood Urea Nitrogen


 31 mg/dL


(7-20) 


 


 





 


Creatinine


 1.1 mg/dL


(0.6-1.0) 


 


 





 


Estimated GFR


(Cockcroft-Gault) 52.8 


 


 


 





 


Glucose Level


 165 mg/dL


(70-99) 


 


 





 


Calcium Level


 7.2 mg/dL


(8.5-10.1) 


 


 





 


Phosphorus Level


 2.9 mg/dL


(2.6-4.7) 


 


 





 


Magnesium Level


 2.0 mg/dL


(1.8-2.4) 


 


 











Medications





Active Scripts








 Medications  Dose


 Route/Sig


 Max Daily Dose Days Date Category


 


 Bisoprolol


 Fumarate 5 Mg


 Tablet  10 Mg


 PO DAILY


   3/16/20 Reported








Comments


Chest x-ray reviewed 4/1


CT chest 3/30


reviewed





Impression


.


IMPRESSION:


1.  Acute hypoxemic respiratory failure secondary to ARDS due to acute 

pancreatitis, septic shock, abdominal distention, and pneumonia.and pleural 

effusions.  Pleural effusions secondary to abdominal process and/or general 

volume overload from renal failure.


2.  Gallstone pancreatitis. WITH NECROSIS


3.  Severe metabolic acidosis.


4.  Acute kidney injury. ON CRRT


5.  Acute gallstone pancreatitis.


6.  Hypoalbuminemia.


7.  Hypocalcemia.


8.  Leukocytosis


9.  Chronic anemia


10. Covid 19 testing negative


11. Acute /chronic anemia suspected hemorrhagic fluid in pelvis





Plan


.


AC mode, no change in current FIO2/ PEEP


Not ready for trial, still fevers


Cont AC mode , 40 FIO2/  PEEP.  


Transfuse prn


Not a surgical candidate per surgery


supportive care


CRRT


Antibiotics per ID


Follow nephrology input


Nutritional support with TPN


Prognosis is extremely poor


d/w RN/


no intervention for effusions, increase UF with CRRT


cct 30 min











SHARYN SOLORZANO MD                  Apr 1, 2020 14:28

## 2020-04-01 NOTE — NUR
SS following up with discharge planning. SS discussed with RNEbony. Pt remains on the vent 
and CRRT at this time. Pt is self pay pt. [facility] continuing to follow for self pay status. SS 
will continue to follow for discharge planning.

## 2020-04-01 NOTE — PDOC
Infectious Disease Note


Subjective


Subjective


intubated sedated





ROS


ROS


no n/v/d/





Vital Sign


Vital Signs





Vital Signs








  Date Time  Temp Pulse Resp B/P (MAP) Pulse Ox O2 Delivery O2 Flow Rate FiO2


 


4/1/20 08:17     100 Ventilator  


 


4/1/20 08:00 98.4 108 25 118/57 (77)    





 98.4       


 


3/31/20 12:10       6.0 











Physical Exam


PHYSICAL EXAM


GENERAL: Orally intubated/sedated - generalizd anasarca 


HEENT: Mild icterus, pupils equal, ETT, NGT


NECK:  Supple. 


LUNGS:  Decreased breath sounds at the bases.


HEART:  S1, S2, regular 


ABDOMEN:  Distended, hypoactive BS, Rectal tube in place 


: [place]   


EXTREMITIES: Generalized edema, no cyanosis - some mottling, [place] boots 

bilaterally 


DERMATOLOGIC:  Warm and dry.  No generalized rash.  


CENTRAL NERVOUS SYSTEM: Sedated 


RIJ Temp HDC, RUE-PICC & LIJ - clean





Labs


Lab





Laboratory Tests








Test


 3/31/20


12:00 3/31/20


17:30 3/31/20


17:40 4/1/20


00:10


 


Prothrombin Time


 21.3 SEC


(11.7-14.0) 


 


 





 


Prothromb Time International


Ratio 1.9 (0.8-1.1) 


 


 


 





 


Sodium Level


 138 mmol/L


(136-145) 


 138 mmol/L


(136-145) 





 


Potassium Level


 3.9 mmol/L


(3.5-5.1) 


 4.1 mmol/L


(3.5-5.1) 





 


Chloride Level


 103 mmol/L


() 


 104 mmol/L


() 





 


Carbon Dioxide Level


 27 mmol/L


(21-32) 


 28 mmol/L


(21-32) 





 


Anion Gap 8 (6-14)   6 (6-14)  


 


Blood Urea Nitrogen


 33 mg/dL


(7-20) 


 33 mg/dL


(7-20) 





 


Creatinine


 1.2 mg/dL


(0.6-1.0) 


 1.2 mg/dL


(0.6-1.0) 





 


Estimated GFR


(Cockcroft-Gault) 47.7 


 


 47.7 


 





 


Glucose Level


 192 mg/dL


(70-99) 


 185 mg/dL


(70-99) 





 


Calcium Level


 8.0 mg/dL


(8.5-10.1) 


 8.1 mg/dL


(8.5-10.1) 





 


Phosphorus Level


 2.7 mg/dL


(2.6-4.7) 


 3.1 mg/dL


(2.6-4.7) 





 


Magnesium Level


 2.2 mg/dL


(1.8-2.4) 


 2.3 mg/dL


(1.8-2.4) 





 


White Blood Count


 


 37.4 x10^3/uL


(4.0-11.0) 


 





 


Red Blood Count


 


 2.89 x10^6/uL


(3.50-5.40) 


 





 


Hemoglobin


 


 9.3 g/dL


(12.0-15.5) 


 





 


Hematocrit


 


 28.0 %


(36.0-47.0) 


 





 


Mean Corpuscular Volume  97 fL ()   


 


Mean Corpuscular Hemoglobin  32 pg (25-35)   


 


Mean Corpuscular Hemoglobin


Concent 


 33 g/dL


(31-37) 


 





 


Red Cell Distribution Width


 


 17.1 %


(11.5-14.5) 


 





 


Platelet Count


 


 307 x10^3/uL


(140-400) 


 





 


Neutrophils (%) (Auto)  86 % (31-73)   


 


Lymphocytes (%) (Auto)  6 % (24-48)   


 


Monocytes (%) (Auto)  5 % (0-9)   


 


Eosinophils (%) (Auto)  2 % (0-3)   


 


Basophils (%) (Auto)  1 % (0-3)   


 


Neutrophils # (Auto)


 


 32.1 x10^3/uL


(1.8-7.7) 


 





 


Lymphocytes # (Auto)


 


 2.4 x10^3/uL


(1.0-4.8) 


 





 


Monocytes # (Auto)


 


 1.9 x10^3/uL


(0.0-1.1) 


 





 


Eosinophils # (Auto)


 


 0.8 x10^3/uL


(0.0-0.7) 


 





 


Basophils # (Auto)


 


 0.2 x10^3/uL


(0.0-0.2) 


 





 


BUN/Creatinine Ratio   28 (6-20)  


 


Total Bilirubin


 


 


 1.0 mg/dL


(0.2-1.0) 





 


Aspartate Amino Transf


(AST/SGOT) 


 


 50 U/L (15-37) 


 





 


Alanine Aminotransferase


(ALT/SGPT) 


 


 21 U/L (14-59) 


 





 


Alkaline Phosphatase


 


 


 128 U/L


() 





 


Total Protein


 


 


 5.3 g/dL


(6.4-8.2) 





 


Albumin


 


 


 2.5 g/dL


(3.4-5.0) 





 


Albumin/Globulin Ratio   0.9 (1.0-1.7)  


 


Glucose (Fingerstick)


 


 


 


 170 mg/dL


(70-99)


 


Test


 4/1/20


00:15 4/1/20


05:30 4/1/20


05:37 4/1/20


08:00


 


Sodium Level


 137 mmol/L


(136-145) 138 mmol/L


(136-145) 


 





 


Potassium Level


 4.1 mmol/L


(3.5-5.1) 4.0 mmol/L


(3.5-5.1) 


 





 


Chloride Level


 103 mmol/L


() 104 mmol/L


() 


 





 


Carbon Dioxide Level


 28 mmol/L


(21-32) 28 mmol/L


(21-32) 


 





 


Anion Gap 6 (6-14)  6 (6-14)   


 


Blood Urea Nitrogen


 32 mg/dL


(7-20) 31 mg/dL


(7-20) 


 





 


Creatinine


 1.1 mg/dL


(0.6-1.0) 1.1 mg/dL


(0.6-1.0) 


 





 


Estimated GFR


(Cockcroft-Gault) 52.8 


 52.8 


 


 





 


Glucose Level


 181 mg/dL


(70-99) 177 mg/dL


(70-99) 


 





 


Calcium Level


 8.0 mg/dL


(8.5-10.1) 8.2 mg/dL


(8.5-10.1) 


 





 


Phosphorus Level


 2.9 mg/dL


(2.6-4.7) 2.9 mg/dL


(2.6-4.7) 


 





 


Magnesium Level


 2.1 mg/dL


(1.8-2.4) 2.3 mg/dL


(1.8-2.4) 


 





 


White Blood Count


 


 30.1 x10^3/uL


(4.0-11.0) 


 





 


Red Blood Count


 


 2.81 x10^6/uL


(3.50-5.40) 


 





 


Hemoglobin


 


 9.1 g/dL


(12.0-15.5) 


 





 


Hematocrit


 


 27.5 %


(36.0-47.0) 


 





 


Mean Corpuscular Volume  98 fL ()   


 


Mean Corpuscular Hemoglobin  32 pg (25-35)   


 


Mean Corpuscular Hemoglobin


Concent 


 33 g/dL


(31-37) 


 





 


Red Cell Distribution Width


 


 16.8 %


(11.5-14.5) 


 





 


Platelet Count


 


 281 x10^3/uL


(140-400) 


 





 


Neutrophils (%) (Auto)  81 % (31-73)   


 


Lymphocytes (%) (Auto)  10 % (24-48)   


 


Monocytes (%) (Auto)  6 % (0-9)   


 


Eosinophils (%) (Auto)  2 % (0-3)   


 


Basophils (%) (Auto)  1 % (0-3)   


 


Neutrophils # (Auto)


 


 24.4 x10^3/uL


(1.8-7.7) 


 





 


Lymphocytes # (Auto)


 


 3.0 x10^3/uL


(1.0-4.8) 


 





 


Monocytes # (Auto)


 


 1.7 x10^3/uL


(0.0-1.1) 


 





 


Eosinophils # (Auto)


 


 0.7 x10^3/uL


(0.0-0.7) 


 





 


Basophils # (Auto)


 


 0.2 x10^3/uL


(0.0-0.2) 


 





 


BUN/Creatinine Ratio  28 (6-20)   


 


Total Bilirubin


 


 1.0 mg/dL


(0.2-1.0) 


 





 


Aspartate Amino Transf


(AST/SGOT) 


 46 U/L (15-37) 


 


 





 


Alanine Aminotransferase


(ALT/SGPT) 


 20 U/L (14-59) 


 


 





 


Alkaline Phosphatase


 


 119 U/L


() 


 





 


Total Protein


 


 5.2 g/dL


(6.4-8.2) 


 





 


Albumin


 


 2.4 g/dL


(3.4-5.0) 


 





 


Albumin/Globulin Ratio  0.9 (1.0-1.7)   


 


Glucose (Fingerstick)


 


 


 182 mg/dL


(70-99) 





 


O2 Saturation    95 % (92-99) 


 


Arterial Blood pH


 


 


 


 7.34


(7.35-7.45)


 


Arterial Blood pCO2 at


Patient Temp 


 


 


 41 mmHg


(35-46)


 


Arterial Blood pO2 at Patient


Temp 


 


 


 83 mmHg


()


 


Arterial Blood HCO3


 


 


 


 22 mmol/L


(21-28)


 


Arterial Blood Base Excess


 


 


 


 -4 mmol/L


(-3-3)


 


FiO2    30% 








Micro





Microbiology


3/24/20 Blood Culture - Final, Complete


          NO GROWTH AFTER 5 DAYS





Objective


Assessment


Leukocytosis - - ? reactive - trouble with CRRT/S/p PRBCs ? intra-abdominal 


Fever - better - Flu neg antigen 3/26 ? reactive with pancreatitis vs ID - C-

diff neg. S/p HD cath change with malfunction 3/25 - cults 3/24 neg


Lung opacities, COVID-19 neg 


Hypotension 


Acute pancreatitis, early developing necrosis -U/S 3/26 reviewed


JED,Hyperkalemia, Metabolic acidosis on CRRT


   - s/p RIJ temporary dialysis catheter replacement, 3/25


Anasarca - worse


Acute hypoxic resp failure, intubated


? developing peripheral ischemia - better


Cholelithiasis


Anemia - S/p PRBC 3/26


Hypocalcemia 


Prediabetes


HTN





Plan


Plan of Care


Continue Dapto/cefepime (3/25), Flagyl and micafungin (3/23) 


   -Previously on Merrem, Zyvox 


F/u Blood cults 3/24 so far neg





No surgical plans at this time


Maintain aspiration precautions 


Airborne isolation d/c'd. COVID-19 neg  


Repeat CBC





need ct chest, abd and pelvis,,, noted


d/w GI


D/w nursing





Critically ill











LINN FRANZ MD                Apr 1, 2020 08:25

## 2020-04-01 NOTE — RAD
AP chest.

 

HISTORY: Intubated

 

AP view was taken of the chest. Endotracheal tube stops at the level the 

clavicles without change. NG tube extends into the stomach. Dialysis 

catheter is in the superior vena cava. There is a left central line 

extending to the superior vena cava. There is a right PICC line which is 

poorly seen with the other tubes and lines. There are bilateral effusions.

There is vascular congestion. There is hazy atelectasis or infiltrate or 

edema in the lung bases.

 

IMPRESSION:

1. No change compared to one day ago.

 

Electronically signed by: Sourav Frias MD (4/1/2020 8:09 AM) [id number]

## 2020-04-01 NOTE — PDOC
SURGICAL PROGRESS NOTE


Subjective


Pt sedated on vent, reasonably stable


Vital Signs





Vital Signs








  Date Time  Temp Pulse Resp B/P (MAP) Pulse Ox O2 Delivery O2 Flow Rate FiO2


 


4/1/20 09:00  106 25 93/67 (76) 100 Ventilator  


 


4/1/20 08:00 98.4       





 98.4       


 


3/31/20 12:10       6.0 








I&O











Intake and Output 


 


 4/1/20





 07:00


 


Intake Total 3278 ml


 


Output Total 555 ml


 


Balance 2723 ml


 


 


 


IV Total 2598 ml


 


Blood Product 630 ml


 


Blood Product IV Normal Saline Flush 50 ml


 


Output Urine Total 430 ml


 


Gastric Drainage Total 125 ml








General:  No acute distress


Abdomen:  Soft, Other (distended, NTTP)


Labs





Laboratory Tests








Test


 3/30/20


14:18 3/30/20


14:30 3/30/20


17:32 3/30/20


23:33


 


Glucose (Fingerstick)


 237 mg/dL


(70-99) 


 161 mg/dL


(70-99) 180 mg/dL


(70-99)


 


Sodium Level


 


 137 mmol/L


(136-145) 


 





 


Potassium Level


 


 4.4 mmol/L


(3.5-5.1) 


 





 


Chloride Level


 


 103 mmol/L


() 


 





 


Carbon Dioxide Level


 


 26 mmol/L


(21-32) 


 





 


Anion Gap  8 (6-14)   


 


Blood Urea Nitrogen


 


 41 mg/dL


(7-20) 


 





 


Creatinine


 


 1.6 mg/dL


(0.6-1.0) 


 





 


Estimated GFR


(Cockcroft-Gault) 


 34.3 


 


 





 


Glucose Level


 


 244 mg/dL


(70-99) 


 





 


Calcium Level


 


 8.1 mg/dL


(8.5-10.1) 


 





 


Phosphorus Level


 


 3.0 mg/dL


(2.6-4.7) 


 





 


Magnesium Level


 


 2.2 mg/dL


(1.8-2.4) 


 





 


Test


 3/31/20


00:30 3/31/20


05:30 3/31/20


05:43 3/31/20


08:00


 


Sodium Level


 137 mmol/L


(136-145) 138 mmol/L


(136-145) 


 





 


Potassium Level


 4.1 mmol/L


(3.5-5.1) 4.1 mmol/L


(3.5-5.1) 


 





 


Chloride Level


 104 mmol/L


() 105 mmol/L


() 


 





 


Carbon Dioxide Level


 28 mmol/L


(21-32) 27 mmol/L


(21-32) 


 





 


Anion Gap 5 (6-14)  6 (6-14)   


 


Blood Urea Nitrogen


 35 mg/dL


(7-20) 34 mg/dL


(7-20) 


 





 


Creatinine


 1.3 mg/dL


(0.6-1.0) 1.3 mg/dL


(0.6-1.0) 


 





 


Estimated GFR


(Cockcroft-Gault) 43.5 


 43.5 


 


 





 


Glucose Level


 193 mg/dL


(70-99) 185 mg/dL


(70-99) 


 





 


Calcium Level


 7.9 mg/dL


(8.5-10.1) 8.1 mg/dL


(8.5-10.1) 


 





 


Phosphorus Level


 2.9 mg/dL


(2.6-4.7) 2.9 mg/dL


(2.6-4.7) 


 





 


Magnesium Level


 2.1 mg/dL


(1.8-2.4) 2.4 mg/dL


(1.8-2.4) 


 





 


White Blood Count


 


 35.5 x10^3/uL


(4.0-11.0) 


 





 


Red Blood Count


 


 1.92 x10^6/uL


(3.50-5.40) 


 





 


Hemoglobin


 


 6.5 g/dL


(12.0-15.5) 


 





 


Hematocrit


 


 19.4 %


(36.0-47.0) 


 





 


Mean Corpuscular Volume


 


 101 fL


() 


 





 


Mean Corpuscular Hemoglobin  34 pg (25-35)   


 


Mean Corpuscular Hemoglobin


Concent 


 33 g/dL


(31-37) 


 





 


Red Cell Distribution Width


 


 15.4 %


(11.5-14.5) 


 





 


Platelet Count


 


 354 x10^3/uL


(140-400) 


 





 


Neutrophils (%) (Auto)  82 % (31-73)   


 


Lymphocytes (%) (Auto)  12 % (24-48)   


 


Monocytes (%) (Auto)  5 % (0-9)   


 


Eosinophils (%) (Auto)  1 % (0-3)   


 


Basophils (%) (Auto)  1 % (0-3)   


 


Neutrophils # (Auto)


 


 29.1 x10^3/uL


(1.8-7.7) 


 





 


Lymphocytes # (Auto)


 


 4.1 x10^3/uL


(1.0-4.8) 


 





 


Monocytes # (Auto)


 


 1.7 x10^3/uL


(0.0-1.1) 


 





 


Eosinophils # (Auto)


 


 0.4 x10^3/uL


(0.0-0.7) 


 





 


Basophils # (Auto)


 


 0.2 x10^3/uL


(0.0-0.2) 


 





 


Glucose (Fingerstick)


 


 


 194 mg/dL


(70-99) 





 


O2 Saturation    93 % (92-99) 


 


Arterial Blood pH


 


 


 


 7.34


(7.35-7.45)


 


Arterial Blood pCO2 at


Patient Temp 


 


 


 47 mmHg


(35-46)


 


Arterial Blood pO2 at Patient


Temp 


 


 


 73 mmHg


()


 


Arterial Blood HCO3


 


 


 


 25 mmol/L


(21-28)


 


Arterial Blood Base Excess


 


 


 


 -1 mmol/L


(-3-3)


 


FiO2    40 


 


Test


 3/31/20


12:00 3/31/20


17:30 3/31/20


17:40 4/1/20


00:10


 


Prothrombin Time


 21.3 SEC


(11.7-14.0) 


 


 





 


Prothromb Time International


Ratio 1.9 (0.8-1.1) 


 


 


 





 


Sodium Level


 138 mmol/L


(136-145) 


 138 mmol/L


(136-145) 





 


Potassium Level


 3.9 mmol/L


(3.5-5.1) 


 4.1 mmol/L


(3.5-5.1) 





 


Chloride Level


 103 mmol/L


() 


 104 mmol/L


() 





 


Carbon Dioxide Level


 27 mmol/L


(21-32) 


 28 mmol/L


(21-32) 





 


Anion Gap 8 (6-14)   6 (6-14)  


 


Blood Urea Nitrogen


 33 mg/dL


(7-20) 


 33 mg/dL


(7-20) 





 


Creatinine


 1.2 mg/dL


(0.6-1.0) 


 1.2 mg/dL


(0.6-1.0) 





 


Estimated GFR


(Cockcroft-Gault) 47.7 


 


 47.7 


 





 


Glucose Level


 192 mg/dL


(70-99) 


 185 mg/dL


(70-99) 





 


Calcium Level


 8.0 mg/dL


(8.5-10.1) 


 8.1 mg/dL


(8.5-10.1) 





 


Phosphorus Level


 2.7 mg/dL


(2.6-4.7) 


 3.1 mg/dL


(2.6-4.7) 





 


Magnesium Level


 2.2 mg/dL


(1.8-2.4) 


 2.3 mg/dL


(1.8-2.4) 





 


White Blood Count


 


 37.4 x10^3/uL


(4.0-11.0) 


 





 


Red Blood Count


 


 2.89 x10^6/uL


(3.50-5.40) 


 





 


Hemoglobin


 


 9.3 g/dL


(12.0-15.5) 


 





 


Hematocrit


 


 28.0 %


(36.0-47.0) 


 





 


Mean Corpuscular Volume  97 fL ()   


 


Mean Corpuscular Hemoglobin  32 pg (25-35)   


 


Mean Corpuscular Hemoglobin


Concent 


 33 g/dL


(31-37) 


 





 


Red Cell Distribution Width


 


 17.1 %


(11.5-14.5) 


 





 


Platelet Count


 


 307 x10^3/uL


(140-400) 


 





 


Neutrophils (%) (Auto)  86 % (31-73)   


 


Lymphocytes (%) (Auto)  6 % (24-48)   


 


Monocytes (%) (Auto)  5 % (0-9)   


 


Eosinophils (%) (Auto)  2 % (0-3)   


 


Basophils (%) (Auto)  1 % (0-3)   


 


Neutrophils # (Auto)


 


 32.1 x10^3/uL


(1.8-7.7) 


 





 


Lymphocytes # (Auto)


 


 2.4 x10^3/uL


(1.0-4.8) 


 





 


Monocytes # (Auto)


 


 1.9 x10^3/uL


(0.0-1.1) 


 





 


Eosinophils # (Auto)


 


 0.8 x10^3/uL


(0.0-0.7) 


 





 


Basophils # (Auto)


 


 0.2 x10^3/uL


(0.0-0.2) 


 





 


BUN/Creatinine Ratio   28 (6-20)  


 


Total Bilirubin


 


 


 1.0 mg/dL


(0.2-1.0) 





 


Aspartate Amino Transf


(AST/SGOT) 


 


 50 U/L (15-37) 


 





 


Alanine Aminotransferase


(ALT/SGPT) 


 


 21 U/L (14-59) 


 





 


Alkaline Phosphatase


 


 


 128 U/L


() 





 


Total Protein


 


 


 5.3 g/dL


(6.4-8.2) 





 


Albumin


 


 


 2.5 g/dL


(3.4-5.0) 





 


Albumin/Globulin Ratio   0.9 (1.0-1.7)  


 


Glucose (Fingerstick)


 


 


 


 170 mg/dL


(70-99)


 


Test


 4/1/20


00:15 4/1/20


05:30 4/1/20


05:37 4/1/20


08:00


 


Sodium Level


 137 mmol/L


(136-145) 138 mmol/L


(136-145) 


 





 


Potassium Level


 4.1 mmol/L


(3.5-5.1) 4.0 mmol/L


(3.5-5.1) 


 





 


Chloride Level


 103 mmol/L


() 104 mmol/L


() 


 





 


Carbon Dioxide Level


 28 mmol/L


(21-32) 28 mmol/L


(21-32) 


 





 


Anion Gap 6 (6-14)  6 (6-14)   


 


Blood Urea Nitrogen


 32 mg/dL


(7-20) 31 mg/dL


(7-20) 


 





 


Creatinine


 1.1 mg/dL


(0.6-1.0) 1.1 mg/dL


(0.6-1.0) 


 





 


Estimated GFR


(Cockcroft-Gault) 52.8 


 52.8 


 


 





 


Glucose Level


 181 mg/dL


(70-99) 177 mg/dL


(70-99) 


 





 


Calcium Level


 8.0 mg/dL


(8.5-10.1) 8.2 mg/dL


(8.5-10.1) 


 





 


Phosphorus Level


 2.9 mg/dL


(2.6-4.7) 2.9 mg/dL


(2.6-4.7) 


 





 


Magnesium Level


 2.1 mg/dL


(1.8-2.4) 2.3 mg/dL


(1.8-2.4) 


 





 


White Blood Count


 


 30.1 x10^3/uL


(4.0-11.0) 


 





 


Red Blood Count


 


 2.81 x10^6/uL


(3.50-5.40) 


 





 


Hemoglobin


 


 9.1 g/dL


(12.0-15.5) 


 





 


Hematocrit


 


 27.5 %


(36.0-47.0) 


 





 


Mean Corpuscular Volume  98 fL ()   


 


Mean Corpuscular Hemoglobin  32 pg (25-35)   


 


Mean Corpuscular Hemoglobin


Concent 


 33 g/dL


(31-37) 


 





 


Red Cell Distribution Width


 


 16.8 %


(11.5-14.5) 


 





 


Platelet Count


 


 281 x10^3/uL


(140-400) 


 





 


Neutrophils (%) (Auto)  81 % (31-73)   


 


Lymphocytes (%) (Auto)  10 % (24-48)   


 


Monocytes (%) (Auto)  6 % (0-9)   


 


Eosinophils (%) (Auto)  2 % (0-3)   


 


Basophils (%) (Auto)  1 % (0-3)   


 


Neutrophils # (Auto)


 


 24.4 x10^3/uL


(1.8-7.7) 


 





 


Lymphocytes # (Auto)


 


 3.0 x10^3/uL


(1.0-4.8) 


 





 


Monocytes # (Auto)


 


 1.7 x10^3/uL


(0.0-1.1) 


 





 


Eosinophils # (Auto)


 


 0.7 x10^3/uL


(0.0-0.7) 


 





 


Basophils # (Auto)


 


 0.2 x10^3/uL


(0.0-0.2) 


 





 


BUN/Creatinine Ratio  28 (6-20)   


 


Total Bilirubin


 


 1.0 mg/dL


(0.2-1.0) 


 





 


Aspartate Amino Transf


(AST/SGOT) 


 46 U/L (15-37) 


 


 





 


Alanine Aminotransferase


(ALT/SGPT) 


 20 U/L (14-59) 


 


 





 


Alkaline Phosphatase


 


 119 U/L


() 


 





 


Total Protein


 


 5.2 g/dL


(6.4-8.2) 


 





 


Albumin


 


 2.4 g/dL


(3.4-5.0) 


 





 


Albumin/Globulin Ratio  0.9 (1.0-1.7)   


 


Glucose (Fingerstick)


 


 


 182 mg/dL


(70-99) 





 


O2 Saturation    95 % (92-99) 


 


Arterial Blood pH


 


 


 


 7.34


(7.35-7.45)


 


Arterial Blood pCO2 at


Patient Temp 


 


 


 41 mmHg


(35-46)


 


Arterial Blood pO2 at Patient


Temp 


 


 


 83 mmHg


()


 


Arterial Blood HCO3


 


 


 


 22 mmol/L


(21-28)


 


Arterial Blood Base Excess


 


 


 


 -4 mmol/L


(-3-3)


 


FiO2    30% 








Laboratory Tests








Test


 3/31/20


12:00 3/31/20


17:30 3/31/20


17:40 4/1/20


00:10


 


Prothrombin Time


 21.3 SEC


(11.7-14.0) 


 


 





 


Prothromb Time International


Ratio 1.9 (0.8-1.1) 


 


 


 





 


Sodium Level


 138 mmol/L


(136-145) 


 138 mmol/L


(136-145) 





 


Potassium Level


 3.9 mmol/L


(3.5-5.1) 


 4.1 mmol/L


(3.5-5.1) 





 


Chloride Level


 103 mmol/L


() 


 104 mmol/L


() 





 


Carbon Dioxide Level


 27 mmol/L


(21-32) 


 28 mmol/L


(21-32) 





 


Anion Gap 8 (6-14)   6 (6-14)  


 


Blood Urea Nitrogen


 33 mg/dL


(7-20) 


 33 mg/dL


(7-20) 





 


Creatinine


 1.2 mg/dL


(0.6-1.0) 


 1.2 mg/dL


(0.6-1.0) 





 


Estimated GFR


(Cockcroft-Gault) 47.7 


 


 47.7 


 





 


Glucose Level


 192 mg/dL


(70-99) 


 185 mg/dL


(70-99) 





 


Calcium Level


 8.0 mg/dL


(8.5-10.1) 


 8.1 mg/dL


(8.5-10.1) 





 


Phosphorus Level


 2.7 mg/dL


(2.6-4.7) 


 3.1 mg/dL


(2.6-4.7) 





 


Magnesium Level


 2.2 mg/dL


(1.8-2.4) 


 2.3 mg/dL


(1.8-2.4) 





 


White Blood Count


 


 37.4 x10^3/uL


(4.0-11.0) 


 





 


Red Blood Count


 


 2.89 x10^6/uL


(3.50-5.40) 


 





 


Hemoglobin


 


 9.3 g/dL


(12.0-15.5) 


 





 


Hematocrit


 


 28.0 %


(36.0-47.0) 


 





 


Mean Corpuscular Volume  97 fL ()   


 


Mean Corpuscular Hemoglobin  32 pg (25-35)   


 


Mean Corpuscular Hemoglobin


Concent 


 33 g/dL


(31-37) 


 





 


Red Cell Distribution Width


 


 17.1 %


(11.5-14.5) 


 





 


Platelet Count


 


 307 x10^3/uL


(140-400) 


 





 


Neutrophils (%) (Auto)  86 % (31-73)   


 


Lymphocytes (%) (Auto)  6 % (24-48)   


 


Monocytes (%) (Auto)  5 % (0-9)   


 


Eosinophils (%) (Auto)  2 % (0-3)   


 


Basophils (%) (Auto)  1 % (0-3)   


 


Neutrophils # (Auto)


 


 32.1 x10^3/uL


(1.8-7.7) 


 





 


Lymphocytes # (Auto)


 


 2.4 x10^3/uL


(1.0-4.8) 


 





 


Monocytes # (Auto)


 


 1.9 x10^3/uL


(0.0-1.1) 


 





 


Eosinophils # (Auto)


 


 0.8 x10^3/uL


(0.0-0.7) 


 





 


Basophils # (Auto)


 


 0.2 x10^3/uL


(0.0-0.2) 


 





 


BUN/Creatinine Ratio   28 (6-20)  


 


Total Bilirubin


 


 


 1.0 mg/dL


(0.2-1.0) 





 


Aspartate Amino Transf


(AST/SGOT) 


 


 50 U/L (15-37) 


 





 


Alanine Aminotransferase


(ALT/SGPT) 


 


 21 U/L (14-59) 


 





 


Alkaline Phosphatase


 


 


 128 U/L


() 





 


Total Protein


 


 


 5.3 g/dL


(6.4-8.2) 





 


Albumin


 


 


 2.5 g/dL


(3.4-5.0) 





 


Albumin/Globulin Ratio   0.9 (1.0-1.7)  


 


Glucose (Fingerstick)


 


 


 


 170 mg/dL


(70-99)


 


Test


 4/1/20


00:15 4/1/20


05:30 4/1/20


05:37 4/1/20


08:00


 


Sodium Level


 137 mmol/L


(136-145) 138 mmol/L


(136-145) 


 





 


Potassium Level


 4.1 mmol/L


(3.5-5.1) 4.0 mmol/L


(3.5-5.1) 


 





 


Chloride Level


 103 mmol/L


() 104 mmol/L


() 


 





 


Carbon Dioxide Level


 28 mmol/L


(21-32) 28 mmol/L


(21-32) 


 





 


Anion Gap 6 (6-14)  6 (6-14)   


 


Blood Urea Nitrogen


 32 mg/dL


(7-20) 31 mg/dL


(7-20) 


 





 


Creatinine


 1.1 mg/dL


(0.6-1.0) 1.1 mg/dL


(0.6-1.0) 


 





 


Estimated GFR


(Cockcroft-Gault) 52.8 


 52.8 


 


 





 


Glucose Level


 181 mg/dL


(70-99) 177 mg/dL


(70-99) 


 





 


Calcium Level


 8.0 mg/dL


(8.5-10.1) 8.2 mg/dL


(8.5-10.1) 


 





 


Phosphorus Level


 2.9 mg/dL


(2.6-4.7) 2.9 mg/dL


(2.6-4.7) 


 





 


Magnesium Level


 2.1 mg/dL


(1.8-2.4) 2.3 mg/dL


(1.8-2.4) 


 





 


White Blood Count


 


 30.1 x10^3/uL


(4.0-11.0) 


 





 


Red Blood Count


 


 2.81 x10^6/uL


(3.50-5.40) 


 





 


Hemoglobin


 


 9.1 g/dL


(12.0-15.5) 


 





 


Hematocrit


 


 27.5 %


(36.0-47.0) 


 





 


Mean Corpuscular Volume  98 fL ()   


 


Mean Corpuscular Hemoglobin  32 pg (25-35)   


 


Mean Corpuscular Hemoglobin


Concent 


 33 g/dL


(31-37) 


 





 


Red Cell Distribution Width


 


 16.8 %


(11.5-14.5) 


 





 


Platelet Count


 


 281 x10^3/uL


(140-400) 


 





 


Neutrophils (%) (Auto)  81 % (31-73)   


 


Lymphocytes (%) (Auto)  10 % (24-48)   


 


Monocytes (%) (Auto)  6 % (0-9)   


 


Eosinophils (%) (Auto)  2 % (0-3)   


 


Basophils (%) (Auto)  1 % (0-3)   


 


Neutrophils # (Auto)


 


 24.4 x10^3/uL


(1.8-7.7) 


 





 


Lymphocytes # (Auto)


 


 3.0 x10^3/uL


(1.0-4.8) 


 





 


Monocytes # (Auto)


 


 1.7 x10^3/uL


(0.0-1.1) 


 





 


Eosinophils # (Auto)


 


 0.7 x10^3/uL


(0.0-0.7) 


 





 


Basophils # (Auto)


 


 0.2 x10^3/uL


(0.0-0.2) 


 





 


BUN/Creatinine Ratio  28 (6-20)   


 


Total Bilirubin


 


 1.0 mg/dL


(0.2-1.0) 


 





 


Aspartate Amino Transf


(AST/SGOT) 


 46 U/L (15-37) 


 


 





 


Alanine Aminotransferase


(ALT/SGPT) 


 20 U/L (14-59) 


 


 





 


Alkaline Phosphatase


 


 119 U/L


() 


 





 


Total Protein


 


 5.2 g/dL


(6.4-8.2) 


 





 


Albumin


 


 2.4 g/dL


(3.4-5.0) 


 





 


Albumin/Globulin Ratio  0.9 (1.0-1.7)   


 


Glucose (Fingerstick)


 


 


 182 mg/dL


(70-99) 





 


O2 Saturation    95 % (92-99) 


 


Arterial Blood pH


 


 


 


 7.34


(7.35-7.45)


 


Arterial Blood pCO2 at


Patient Temp 


 


 


 41 mmHg


(35-46)


 


Arterial Blood pO2 at Patient


Temp 


 


 


 83 mmHg


()


 


Arterial Blood HCO3


 


 


 


 22 mmol/L


(21-28)


 


Arterial Blood Base Excess


 


 


 


 -4 mmol/L


(-3-3)


 


FiO2    30% 








Problem List


Problems


Medical Problems:


(1) Acute pancreatitis


Status: Acute  





(2) Cholelithiasis


Status: Acute  








Assessment/Plan


severe pancreatitis


cont supportive care


would favor proceeding with tracheostomy will tentatively plan april 6.











GAMAL ZHOU MD              Apr 1, 2020 10:00

## 2020-04-01 NOTE — PDOC
Objective:


Objective:


D/w nurse and Dr. Davis, reviewed surg note.


Vital Signs:





                                   Vital Signs








  Date Time  Temp Pulse Resp B/P (MAP) Pulse Ox O2 Delivery O2 Flow Rate FiO2


 


4/1/20 10:00  107 25 122/69 (86) 100 Ventilator  


 


4/1/20 08:00 98.4       





 98.4       


 


3/31/20 12:10       6.0 








Labs:





Laboratory Tests








Test


 3/31/20


12:00 3/31/20


17:30 3/31/20


17:40 4/1/20


00:10


 


Prothrombin Time 21.3 SEC    


 


Prothromb Time International


Ratio 1.9 


 


 


 





 


Sodium Level 138 mmol/L   138 mmol/L  


 


Potassium Level 3.9 mmol/L   4.1 mmol/L  


 


Chloride Level 103 mmol/L   104 mmol/L  


 


Carbon Dioxide Level 27 mmol/L   28 mmol/L  


 


Anion Gap 8   6  


 


Blood Urea Nitrogen 33 mg/dL   33 mg/dL  


 


Creatinine 1.2 mg/dL   1.2 mg/dL  


 


Estimated GFR


(Cockcroft-Gault) 47.7 


 


 47.7 


 





 


Glucose Level 192 mg/dL   185 mg/dL  


 


Calcium Level 8.0 mg/dL   8.1 mg/dL  


 


Phosphorus Level 2.7 mg/dL   3.1 mg/dL  


 


Magnesium Level 2.2 mg/dL   2.3 mg/dL  


 


White Blood Count  37.4 x10^3/uL   


 


Red Blood Count  2.89 x10^6/uL   


 


Hemoglobin  9.3 g/dL   


 


Hematocrit  28.0 %   


 


Mean Corpuscular Volume  97 fL   


 


Mean Corpuscular Hemoglobin  32 pg   


 


Mean Corpuscular Hemoglobin


Concent 


 33 g/dL 


 


 





 


Red Cell Distribution Width  17.1 %   


 


Platelet Count  307 x10^3/uL   


 


Neutrophils (%) (Auto)  86 %   


 


Lymphocytes (%) (Auto)  6 %   


 


Monocytes (%) (Auto)  5 %   


 


Eosinophils (%) (Auto)  2 %   


 


Basophils (%) (Auto)  1 %   


 


Neutrophils # (Auto)  32.1 x10^3/uL   


 


Lymphocytes # (Auto)  2.4 x10^3/uL   


 


Monocytes # (Auto)  1.9 x10^3/uL   


 


Eosinophils # (Auto)  0.8 x10^3/uL   


 


Basophils # (Auto)  0.2 x10^3/uL   


 


BUN/Creatinine Ratio   28  


 


Total Bilirubin   1.0 mg/dL  


 


Aspartate Amino Transf


(AST/SGOT) 


 


 50 U/L 


 





 


Alanine Aminotransferase


(ALT/SGPT) 


 


 21 U/L 


 





 


Alkaline Phosphatase   128 U/L  


 


Total Protein   5.3 g/dL  


 


Albumin   2.5 g/dL  


 


Albumin/Globulin Ratio   0.9  


 


Glucose (Fingerstick)    170 mg/dL 


 


Test


 4/1/20


00:15 4/1/20


05:30 4/1/20


05:37 4/1/20


08:00


 


Sodium Level 137 mmol/L  138 mmol/L   


 


Potassium Level 4.1 mmol/L  4.0 mmol/L   


 


Chloride Level 103 mmol/L  104 mmol/L   


 


Carbon Dioxide Level 28 mmol/L  28 mmol/L   


 


Anion Gap 6  6   


 


Blood Urea Nitrogen 32 mg/dL  31 mg/dL   


 


Creatinine 1.1 mg/dL  1.1 mg/dL   


 


Estimated GFR


(Cockcroft-Gault) 52.8 


 52.8 


 


 





 


Glucose Level 181 mg/dL  177 mg/dL   


 


Calcium Level 8.0 mg/dL  8.2 mg/dL   


 


Phosphorus Level 2.9 mg/dL  2.9 mg/dL   


 


Magnesium Level 2.1 mg/dL  2.3 mg/dL   


 


White Blood Count  30.1 x10^3/uL   


 


Red Blood Count  2.81 x10^6/uL   


 


Hemoglobin  9.1 g/dL   


 


Hematocrit  27.5 %   


 


Mean Corpuscular Volume  98 fL   


 


Mean Corpuscular Hemoglobin  32 pg   


 


Mean Corpuscular Hemoglobin


Concent 


 33 g/dL 


 


 





 


Red Cell Distribution Width  16.8 %   


 


Platelet Count  281 x10^3/uL   


 


Neutrophils (%) (Auto)  81 %   


 


Lymphocytes (%) (Auto)  10 %   


 


Monocytes (%) (Auto)  6 %   


 


Eosinophils (%) (Auto)  2 %   


 


Basophils (%) (Auto)  1 %   


 


Neutrophils # (Auto)  24.4 x10^3/uL   


 


Lymphocytes # (Auto)  3.0 x10^3/uL   


 


Monocytes # (Auto)  1.7 x10^3/uL   


 


Eosinophils # (Auto)  0.7 x10^3/uL   


 


Basophils # (Auto)  0.2 x10^3/uL   


 


BUN/Creatinine Ratio  28   


 


Total Bilirubin  1.0 mg/dL   


 


Aspartate Amino Transf


(AST/SGOT) 


 46 U/L 


 


 





 


Alanine Aminotransferase


(ALT/SGPT) 


 20 U/L 


 


 





 


Alkaline Phosphatase  119 U/L   


 


Total Protein  5.2 g/dL   


 


Albumin  2.4 g/dL   


 


Albumin/Globulin Ratio  0.9   


 


Glucose (Fingerstick)   182 mg/dL  


 


O2 Saturation    95 % 


 


Arterial Blood pH    7.34 


 


Arterial Blood pCO2 at


Patient Temp 


 


 


 41 mmHg 





 


Arterial Blood pO2 at Patient


Temp 


 


 


 83 mmHg 





 


Arterial Blood HCO3    22 mmol/L 


 


Arterial Blood Base Excess    -4 mmol/L 


 


FiO2    30% 








Imaging:


CXR 4/1


IMPRESSION:


1. No change compared to one day ago.





PE:





GEN: intubated on CRRT


LUNGS: vent, clear


HEART: RRR


ABD: stable, not firm


NEURO/PSYCH: sedated





A/P:


Gallstone pancreatitis, MOSF





--


Poor surgical candidate, continue support.





Hemodynamically unstable?:  Yes


Is patient in severe pain?:  No


Is NPO status required?:  Yes











RAGHAVENDRA MURCIA          Apr 1, 2020 10:42

## 2020-04-01 NOTE — NUR
Pharmacy TPN Dosing Note



S: JESENIA RICH is a 49 year old F Currently receiving Central Continuous TPN started 
03/18/20



B:Pertinent PMH: 

Necrotizing pancreatitis

Height: 5 feet, 8 inches

Weight: 109.5 kg



Current diet: NPO 



LABS:

Sodium:    137 

Potassium: 3.7 

Chloride:  104 

Calcium:   7.2 

Corrected Calcium: 8.48 

Magnesium: 2.0 

CO2:       29 

SCr:       1.1 

Glucose:   165 

Albumin:   2.4 

AST:       46 

ALT:       20 



TPN FORMULA:

TPN TYPE:  Central Continuous

AMINO ACIDS:         125 gm

DEXTROSE:            195 gm

LIPIDS:              40 gm

SODIUM CHLORIDE:     90 mEq

SODIUM ACETATE:      -- mEq

SODIUM PHOSPHATE:    -- mmol

POTASSIUM CHLORIDE:  15 mEq

POTASSIUM ACETATE:   -- mEq

POTASSIUM PHOSPHATE: 18 mmol

MAGNESIUM:           8 mEq

CALCIUM:             15 mEq

INSULIN:             20 units

MULTIPLE VITAMIN:    10 ml

TRACE ELEMENTS:      0.5 ml(s)



TPN PLAN:  

continue same.





R: Continue TPN AS ORDERED

Will monitor electrolytes, glucose, and tolerance to TPN.



 CHRISTIANNE MELO [facility], 04/01/20 [id number]

## 2020-04-01 NOTE — PDOC
PROGRESS NOTES


Assessment


Assessment


Respiratory failure.


Seizure.


Metabolic encephalopathy.


Fever 102.7 degree.


Metabolic acidosis.


Diffuse pulmonary infiltrate.


Pleural effusion.


Pancreatitis


Gallstone.


Leukocytosis.


Electrolytes imbalances.


Hyperglycemia.


DM.


HTN.


HLD.


Anemia.


Obesity.





RECOMMENDATIONS/PLAN:


Continue life support at the present time.


Keppra if has further seizures.


Treat medical diseases.





EEG last week: No seizure activity.





OBJECTIVE:


4/1/20: No seizures reported over night.





Past Medical History


Cardiovascular:  HTN, Hyperlipidemia


Endocrine:  Diabetes





Family History


Unobtainable.





Social HistoryU


not obtainable





Allergies


Coded Allergies:  


Codeine (Verified  Allergy, Intermediate, rash, 3/16/20)





ROS


Unobtainable in unresponsive state.





PHYSICAL EXAMINATION:   


General appearance in sub acute distress.  


HEENT:  Normocephalic and nontraumatic.  Eyes, nose, ears, and throat are 

unremarkable.


Neck is supple. No lymphadenopathy. 


Cardiovascular:  S1, S2.  


Pulmonary:  On vent.. 


Abdomen:  Bowel sounds are weak.  


Extremities:  No rash, lesions. Edema noted in hands.





NEUROLOGICAL  EXAMINATION:


Minimal responsiveness.


On vent.


Not oriented to time, place and person.


PERRL.


EOMI not elicited,


CN: no acute focal findings.


Muscle tone: within normal.


Muscle strength: No movements to stimuli.


DTR: 1


Plantar reflex: No response bilaterally 


Gait: not able to walk.


Sensory exam: no response to stimuli..


Not able to access cerebellar signs.


F-T-N test not performed due to unresponsiveness





Objective





Objective


Objective





Vital Signs








  Date Time  Temp Pulse Resp B/P (MAP) Pulse Ox O2 Delivery O2 Flow Rate FiO2


 


4/1/20 14:00  99 25 103/65 (78) 100 Ventilator  


 


4/1/20 12:00 100.1       





 100.1       


 


3/31/20 12:10       6.0 














Intake and Output 


 


 4/1/20





 07:00


 


Intake Total 3278 ml


 


Output Total 555 ml


 


Balance 2723 ml


 


 


 


IV Total 2598 ml


 


Blood Product 630 ml


 


Blood Product IV Normal Saline Flush 50 ml


 


Output Urine Total 430 ml


 


Gastric Drainage Total 125 ml











Vitals Signs


Vitals





                          VS - Last 72 Hours, by Label








  Date Time  Temp Pulse Resp B/P (MAP) Pulse Ox O2 Delivery O2 Flow Rate FiO2


 


4/1/20 14:00  99 25 103/65 (78) 100 Ventilator  


 


4/1/20 13:00  103 25 98/58 (71) 100 Ventilator  


 


4/1/20 12:35     100 Ventilator  


 


4/1/20 12:00 100.1 106 25 104/63 (77) 100 Ventilator  





 100.1       


 


4/1/20 11:35      Mechanical Ventilator  


 


4/1/20 11:00  109 25 124/57 (79) 100 Ventilator  


 


4/1/20 10:00  107 25 122/69 (86) 100 Ventilator  


 


4/1/20 09:00  106 25 93/67 (76) 100 Ventilator  


 


4/1/20 08:50     100 Ventilator  


 


4/1/20 08:17     100 Ventilator  


 


4/1/20 08:00 98.4 108 25 118/57 (77) 100 Ventilator  





 98.4       


 


4/1/20 07:51     100 Ventilator  


 


4/1/20 07:38      Mechanical Ventilator  


 


4/1/20 07:00  107 25 103/65 (78) 100 Ventilator  


 


4/1/20 06:00  109 25 107/63 (78) 100 Ventilator  


 


4/1/20 05:00  104 25 100/59 (73) 100 Ventilator  


 


4/1/20 04:00 98.5 101 25 102/78 (86) 100 Ventilator  





 98.5       


 


4/1/20 03:40     100 Ventilator  


 


4/1/20 03:30      Mechanical Ventilator  


 


4/1/20 03:00  100 25 105/76 (86) 100 Ventilator  


 


4/1/20 02:00  102 25 97/65 (76) 100 Ventilator  


 


4/1/20 01:00  102 25 121/63 (82) 100 Ventilator  


 


4/1/20 00:00      Mechanical Ventilator  


 


4/1/20 00:00 99.1 108 25 115/67 (83) 100 Ventilator  





 99.1       


 


3/31/20 23:22     100 Ventilator  


 


3/31/20 23:03      Ventilator  


 


3/31/20 23:00  102 25 111/56 (74) 100 Ventilator  


 


3/31/20 22:00  106 25 114/61 (78) 100 Ventilator  


 


3/31/20 21:57     99 Ventilator  


 


3/31/20 21:00  110 25 133/85 (101) 95 Ventilator  


 


3/31/20 20:25     100 Ventilator  


 


3/31/20 20:00 98.7 105 25 129/53 (78) 100 Ventilator  





 98.7       


 


3/31/20 19:30      Mechanical Ventilator  


 


3/31/20 19:00  105 25 129/53 (78) 100 Ventilator  


 


3/31/20 18:00  111 25 121/79 (93) 99 Ventilator  


 


3/31/20 17:00  104 25 106/71 (83) 100 Ventilator  


 


3/31/20 16:00      Mechanical Ventilator  


 


3/31/20 16:00 99.1 106 25 114/71 (85) 100 Ventilator  





 99.1       


 


3/31/20 15:38     100 Ventilator  


 


3/31/20 15:00  104 25 103/61 (75) 100 Ventilator  


 


3/31/20 14:00  105 25 106/60 (75) 100 Ventilator  


 


3/31/20 13:00  110 25 109/81 (90) 97 Ventilator  


 


3/31/20 12:12     100 Ventilator  


 


3/31/20 12:10     97  6.0 


 


3/31/20 12:00      Mechanical Ventilator  


 


3/31/20 12:00 99.8 108 25 112/61 (78) 100 Ventilator  





 99.8       


 


3/31/20 11:40     100  6.0 


 


3/31/20 11:00  113 28 114/66 (82) 100 Ventilator  


 


3/31/20 10:00  114 26 107/78 (88) 98 Ventilator  


 


3/31/20 09:55 99.0 112 26 122/75    





 99.0       


 


3/31/20 09:18 99.3 113 25 127/65    





 99.3       


 


3/31/20 09:00  116 25 97/56 (70) 99 Ventilator  


 


3/31/20 08:58 99.9 115 26 110/60    





 99.9       


 


3/31/20 08:27     98 Ventilator  


 


3/31/20 08:02 99.5 118 25 92/55    





 99.5       


 


3/31/20 08:00      Mechanical Ventilator  


 


3/31/20 08:00 99.0 114 26 118/61 (80) 99 Ventilator  





 99.0       


 


3/31/20 07:47 99.0 116 25 114/46    





 99.0       


 


3/31/20 07:00  114 25 96/62 (73) 99 Ventilator  











Laboratory


Laboratory





Laboratory Tests








Test


 3/31/20


17:30 3/31/20


17:40 4/1/20


00:10 4/1/20


00:15


 


White Blood Count


 37.4 x10^3/uL


(4.0-11.0) 


 


 





 


Red Blood Count


 2.89 x10^6/uL


(3.50-5.40) 


 


 





 


Hemoglobin


 9.3 g/dL


(12.0-15.5) 


 


 





 


Hematocrit


 28.0 %


(36.0-47.0) 


 


 





 


Mean Corpuscular Volume 97 fL ()    


 


Mean Corpuscular Hemoglobin 32 pg (25-35)    


 


Mean Corpuscular Hemoglobin


Concent 33 g/dL


(31-37) 


 


 





 


Red Cell Distribution Width


 17.1 %


(11.5-14.5) 


 


 





 


Platelet Count


 307 x10^3/uL


(140-400) 


 


 





 


Neutrophils (%) (Auto) 86 % (31-73)    


 


Lymphocytes (%) (Auto) 6 % (24-48)    


 


Monocytes (%) (Auto) 5 % (0-9)    


 


Eosinophils (%) (Auto) 2 % (0-3)    


 


Basophils (%) (Auto) 1 % (0-3)    


 


Neutrophils # (Auto)


 32.1 x10^3/uL


(1.8-7.7) 


 


 





 


Lymphocytes # (Auto)


 2.4 x10^3/uL


(1.0-4.8) 


 


 





 


Monocytes # (Auto)


 1.9 x10^3/uL


(0.0-1.1) 


 


 





 


Eosinophils # (Auto)


 0.8 x10^3/uL


(0.0-0.7) 


 


 





 


Basophils # (Auto)


 0.2 x10^3/uL


(0.0-0.2) 


 


 





 


Sodium Level


 


 138 mmol/L


(136-145) 


 137 mmol/L


(136-145)


 


Potassium Level


 


 4.1 mmol/L


(3.5-5.1) 


 4.1 mmol/L


(3.5-5.1)


 


Chloride Level


 


 104 mmol/L


() 


 103 mmol/L


()


 


Carbon Dioxide Level


 


 28 mmol/L


(21-32) 


 28 mmol/L


(21-32)


 


Anion Gap  6 (6-14)   6 (6-14) 


 


Blood Urea Nitrogen


 


 33 mg/dL


(7-20) 


 32 mg/dL


(7-20)


 


Creatinine


 


 1.2 mg/dL


(0.6-1.0) 


 1.1 mg/dL


(0.6-1.0)


 


Estimated GFR


(Cockcroft-Gault) 


 47.7 


 


 52.8 





 


BUN/Creatinine Ratio  28 (6-20)   


 


Glucose Level


 


 185 mg/dL


(70-99) 


 181 mg/dL


(70-99)


 


Calcium Level


 


 8.1 mg/dL


(8.5-10.1) 


 8.0 mg/dL


(8.5-10.1)


 


Phosphorus Level


 


 3.1 mg/dL


(2.6-4.7) 


 2.9 mg/dL


(2.6-4.7)


 


Magnesium Level


 


 2.3 mg/dL


(1.8-2.4) 


 2.1 mg/dL


(1.8-2.4)


 


Total Bilirubin


 


 1.0 mg/dL


(0.2-1.0) 


 





 


Aspartate Amino Transf


(AST/SGOT) 


 50 U/L (15-37) 


 


 





 


Alanine Aminotransferase


(ALT/SGPT) 


 21 U/L (14-59) 


 


 





 


Alkaline Phosphatase


 


 128 U/L


() 


 





 


Total Protein


 


 5.3 g/dL


(6.4-8.2) 


 





 


Albumin


 


 2.5 g/dL


(3.4-5.0) 


 





 


Albumin/Globulin Ratio  0.9 (1.0-1.7)   


 


Glucose (Fingerstick)


 


 


 170 mg/dL


(70-99) 





 


Test


 4/1/20


05:30 4/1/20


05:37 4/1/20


08:00 4/1/20


11:29


 


White Blood Count


 30.1 x10^3/uL


(4.0-11.0) 


 


 





 


Red Blood Count


 2.81 x10^6/uL


(3.50-5.40) 


 


 





 


Hemoglobin


 9.1 g/dL


(12.0-15.5) 


 


 





 


Hematocrit


 27.5 %


(36.0-47.0) 


 


 





 


Mean Corpuscular Volume 98 fL ()    


 


Mean Corpuscular Hemoglobin 32 pg (25-35)    


 


Mean Corpuscular Hemoglobin


Concent 33 g/dL


(31-37) 


 


 





 


Red Cell Distribution Width


 16.8 %


(11.5-14.5) 


 


 





 


Platelet Count


 281 x10^3/uL


(140-400) 


 


 





 


Neutrophils (%) (Auto) 81 % (31-73)    


 


Lymphocytes (%) (Auto) 10 % (24-48)    


 


Monocytes (%) (Auto) 6 % (0-9)    


 


Eosinophils (%) (Auto) 2 % (0-3)    


 


Basophils (%) (Auto) 1 % (0-3)    


 


Neutrophils # (Auto)


 24.4 x10^3/uL


(1.8-7.7) 


 


 





 


Lymphocytes # (Auto)


 3.0 x10^3/uL


(1.0-4.8) 


 


 





 


Monocytes # (Auto)


 1.7 x10^3/uL


(0.0-1.1) 


 


 





 


Eosinophils # (Auto)


 0.7 x10^3/uL


(0.0-0.7) 


 


 





 


Basophils # (Auto)


 0.2 x10^3/uL


(0.0-0.2) 


 


 





 


Sodium Level


 138 mmol/L


(136-145) 


 


 





 


Potassium Level


 4.0 mmol/L


(3.5-5.1) 


 


 





 


Chloride Level


 104 mmol/L


() 


 


 





 


Carbon Dioxide Level


 28 mmol/L


(21-32) 


 


 





 


Anion Gap 6 (6-14)    


 


Blood Urea Nitrogen


 31 mg/dL


(7-20) 


 


 





 


Creatinine


 1.1 mg/dL


(0.6-1.0) 


 


 





 


Estimated GFR


(Cockcroft-Gault) 52.8 


 


 


 





 


BUN/Creatinine Ratio 28 (6-20)    


 


Glucose Level


 177 mg/dL


(70-99) 


 


 





 


Calcium Level


 8.2 mg/dL


(8.5-10.1) 


 


 





 


Phosphorus Level


 2.9 mg/dL


(2.6-4.7) 


 


 





 


Magnesium Level


 2.3 mg/dL


(1.8-2.4) 


 


 





 


Total Bilirubin


 1.0 mg/dL


(0.2-1.0) 


 


 





 


Aspartate Amino Transf


(AST/SGOT) 46 U/L (15-37) 


 


 


 





 


Alanine Aminotransferase


(ALT/SGPT) 20 U/L (14-59) 


 


 


 





 


Alkaline Phosphatase


 119 U/L


() 


 


 





 


Total Protein


 5.2 g/dL


(6.4-8.2) 


 


 





 


Albumin


 2.4 g/dL


(3.4-5.0) 


 


 





 


Albumin/Globulin Ratio 0.9 (1.0-1.7)    


 


Glucose (Fingerstick)


 


 182 mg/dL


(70-99) 


 163 mg/dL


(70-99)


 


O2 Saturation   95 % (92-99)  


 


Arterial Blood pH


 


 


 7.34


(7.35-7.45) 





 


Arterial Blood pCO2 at


Patient Temp 


 


 41 mmHg


(35-46) 





 


Arterial Blood pO2 at Patient


Temp 


 


 83 mmHg


() 





 


Arterial Blood HCO3


 


 


 22 mmol/L


(21-28) 





 


Arterial Blood Base Excess


 


 


 -4 mmol/L


(-3-3) 





 


FiO2   30%  


 


Test


 4/1/20


11:30 


 


 





 


Sodium Level


 137 mmol/L


(136-145) 


 


 





 


Potassium Level


 3.7 mmol/L


(3.5-5.1) 


 


 





 


Chloride Level


 104 mmol/L


() 


 


 





 


Carbon Dioxide Level


 29 mmol/L


(21-32) 


 


 





 


Anion Gap 4 (6-14)    


 


Blood Urea Nitrogen


 31 mg/dL


(7-20) 


 


 





 


Creatinine


 1.1 mg/dL


(0.6-1.0) 


 


 





 


Estimated GFR


(Cockcroft-Gault) 52.8 


 


 


 





 


Glucose Level


 165 mg/dL


(70-99) 


 


 





 


Calcium Level


 7.2 mg/dL


(8.5-10.1) 


 


 





 


Phosphorus Level


 2.9 mg/dL


(2.6-4.7) 


 


 





 


Magnesium Level


 2.0 mg/dL


(1.8-2.4) 


 


 











Microbiology


3/24/20 Blood Culture - Final, Complete


          NO GROWTH AFTER 5 DAYS





Medication


Medications





Current Medications


Alteplase, Recombinant (Cathflo For Central Catheter Clearance) 1 mg 1X  ONCE 

INT CAT  Last administered on 3/31/20at 20:03;  Start 3/31/20 at 19:30;  Stop 

3/31/20 at 19:46;  Status DC


Alteplase, Recombinant (Cathflo For Central Catheter Clearance) 1 mg 1X  ONCE 

INT CAT  Last administered on 3/31/20at 22:05;  Start 3/31/20 at 22:00;  Stop 

3/31/20 at 22:01;  Status DC


Sodium Chloride 90 meq/Potassium Chloride 15 meq/ Potassium Phosphate 18 mmol/ 

Magnesium Sulfate 8 meq/Calcium Gluconate 15 meq/ Multivitamins 10 ml/Chromium/ 

Copper/Manganese/ Seleni/Zn 0.5 ml/ Insulin Human Regular 20 unit/ Total 

Parenteral Nutrition/Amino Acids/Dextrose/ Fat Emulsion Intravenous 1,400 ml @  

58.333 mls/ hr TPN  CONT IV  Last administered on 3/31/20at 21:36;  Start 

3/31/20 at 22:00;  Stop 4/1/20 at 21:59


Sodium Chloride 90 meq/Potassium Chloride 15 meq/ Potassium Phosphate 18 mmol/ 

Magnesium Sulfate 8 meq/Calcium Gluconate 15 meq/ Multivitamins 10 ml/Chromium/ 

Copper/Manganese/ Seleni/Zn 0.5 ml/ Insulin Human Regular 20 unit/ Total 

Parenteral Nutrition/Amino Acids/Dextrose/ Fat Emulsion Intravenous 1,400 ml @  

58.333 mls/ hr TPN  CONT IV ;  Start 4/1/20 at 22:00;  Stop 4/2/20 at 21:59





Comment


Review of Relevant


I have reviewed the following items josy (where applicable) has been applied.











DORYS LANDA MD                  Apr 1, 2020 14:24

## 2020-04-02 VITALS — DIASTOLIC BLOOD PRESSURE: 62 MMHG | SYSTOLIC BLOOD PRESSURE: 103 MMHG

## 2020-04-02 VITALS — DIASTOLIC BLOOD PRESSURE: 71 MMHG | SYSTOLIC BLOOD PRESSURE: 119 MMHG

## 2020-04-02 VITALS — DIASTOLIC BLOOD PRESSURE: 59 MMHG | SYSTOLIC BLOOD PRESSURE: 99 MMHG

## 2020-04-02 VITALS — SYSTOLIC BLOOD PRESSURE: 97 MMHG | DIASTOLIC BLOOD PRESSURE: 59 MMHG

## 2020-04-02 VITALS — SYSTOLIC BLOOD PRESSURE: 120 MMHG | DIASTOLIC BLOOD PRESSURE: 67 MMHG

## 2020-04-02 VITALS — SYSTOLIC BLOOD PRESSURE: 107 MMHG | DIASTOLIC BLOOD PRESSURE: 49 MMHG

## 2020-04-02 VITALS — SYSTOLIC BLOOD PRESSURE: 144 MMHG | DIASTOLIC BLOOD PRESSURE: 85 MMHG

## 2020-04-02 VITALS — SYSTOLIC BLOOD PRESSURE: 118 MMHG | DIASTOLIC BLOOD PRESSURE: 67 MMHG

## 2020-04-02 VITALS — SYSTOLIC BLOOD PRESSURE: 96 MMHG | DIASTOLIC BLOOD PRESSURE: 59 MMHG

## 2020-04-02 VITALS — SYSTOLIC BLOOD PRESSURE: 111 MMHG | DIASTOLIC BLOOD PRESSURE: 65 MMHG

## 2020-04-02 VITALS — DIASTOLIC BLOOD PRESSURE: 84 MMHG | SYSTOLIC BLOOD PRESSURE: 112 MMHG

## 2020-04-02 VITALS — DIASTOLIC BLOOD PRESSURE: 63 MMHG | SYSTOLIC BLOOD PRESSURE: 125 MMHG

## 2020-04-02 VITALS — DIASTOLIC BLOOD PRESSURE: 65 MMHG | SYSTOLIC BLOOD PRESSURE: 104 MMHG

## 2020-04-02 VITALS — DIASTOLIC BLOOD PRESSURE: 62 MMHG | SYSTOLIC BLOOD PRESSURE: 102 MMHG

## 2020-04-02 VITALS — SYSTOLIC BLOOD PRESSURE: 109 MMHG | DIASTOLIC BLOOD PRESSURE: 62 MMHG

## 2020-04-02 VITALS — DIASTOLIC BLOOD PRESSURE: 63 MMHG | SYSTOLIC BLOOD PRESSURE: 119 MMHG

## 2020-04-02 VITALS — DIASTOLIC BLOOD PRESSURE: 64 MMHG | SYSTOLIC BLOOD PRESSURE: 94 MMHG

## 2020-04-02 VITALS — SYSTOLIC BLOOD PRESSURE: 95 MMHG | DIASTOLIC BLOOD PRESSURE: 57 MMHG

## 2020-04-02 VITALS — DIASTOLIC BLOOD PRESSURE: 62 MMHG | SYSTOLIC BLOOD PRESSURE: 122 MMHG

## 2020-04-02 VITALS — DIASTOLIC BLOOD PRESSURE: 74 MMHG | SYSTOLIC BLOOD PRESSURE: 122 MMHG

## 2020-04-02 VITALS — SYSTOLIC BLOOD PRESSURE: 115 MMHG | DIASTOLIC BLOOD PRESSURE: 66 MMHG

## 2020-04-02 VITALS — DIASTOLIC BLOOD PRESSURE: 64 MMHG | SYSTOLIC BLOOD PRESSURE: 110 MMHG

## 2020-04-02 VITALS — SYSTOLIC BLOOD PRESSURE: 102 MMHG | DIASTOLIC BLOOD PRESSURE: 69 MMHG

## 2020-04-02 VITALS — DIASTOLIC BLOOD PRESSURE: 72 MMHG | SYSTOLIC BLOOD PRESSURE: 136 MMHG

## 2020-04-02 LAB
ANION GAP SERPL CALC-SCNC: 6 MMOL/L (ref 6–14)
ANION GAP SERPL CALC-SCNC: 8 MMOL/L (ref 6–14)
BASE EXCESS ABG: -3 MMOL/L (ref -3–3)
BUN SERPL-MCNC: 33 MG/DL (ref 7–20)
BUN SERPL-MCNC: 38 MG/DL (ref 7–20)
CALCIUM SERPL-MCNC: 8 MG/DL (ref 8.5–10.1)
CALCIUM SERPL-MCNC: 8 MG/DL (ref 8.5–10.1)
CHLORIDE SERPL-SCNC: 105 MMOL/L (ref 98–107)
CHLORIDE SERPL-SCNC: 105 MMOL/L (ref 98–107)
CO2 SERPL-SCNC: 25 MMOL/L (ref 21–32)
CO2 SERPL-SCNC: 27 MMOL/L (ref 21–32)
CREAT SERPL-MCNC: 1.2 MG/DL (ref 0.6–1)
CREAT SERPL-MCNC: 1.3 MG/DL (ref 0.6–1)
GFR SERPLBLD BASED ON 1.73 SQ M-ARVRAT: 43.5 ML/MIN
GFR SERPLBLD BASED ON 1.73 SQ M-ARVRAT: 47.7 ML/MIN
GLUCOSE SERPL-MCNC: 173 MG/DL (ref 70–99)
GLUCOSE SERPL-MCNC: 180 MG/DL (ref 70–99)
HCO3 BLDA-SCNC: 20 MMOL/L (ref 21–28)
INSPIRATION SETTING TIME VENT: 30
MAGNESIUM SERPL-MCNC: 2 MG/DL (ref 1.8–2.4)
MAGNESIUM SERPL-MCNC: 2 MG/DL (ref 1.8–2.4)
PCO2 BLDA: 31 MMHG (ref 35–46)
PHOSPHATE SERPL-MCNC: 3.1 MG/DL (ref 2.6–4.7)
PO2 BLDA: 65 MMHG (ref 75–108)
POTASSIUM SERPL-SCNC: 3.7 MMOL/L (ref 3.5–5.1)
POTASSIUM SERPL-SCNC: 3.8 MMOL/L (ref 3.5–5.1)
SAO2 % BLDA: 91 % (ref 92–99)
SODIUM SERPL-SCNC: 138 MMOL/L (ref 136–145)
SODIUM SERPL-SCNC: 138 MMOL/L (ref 136–145)

## 2020-04-02 RX ADMIN — CEFEPIME SCH GM: 2 INJECTION, POWDER, FOR SOLUTION INTRAVENOUS at 09:11

## 2020-04-02 RX ADMIN — INSULIN LISPRO SCH UNITS: 100 INJECTION, SOLUTION INTRAVENOUS; SUBCUTANEOUS at 11:25

## 2020-04-02 RX ADMIN — POTASSIUM CHLORIDE SCH MLS/HR: 2 INJECTION, SOLUTION, CONCENTRATE INTRAVENOUS at 09:12

## 2020-04-02 RX ADMIN — DAPTOMYCIN SCH MLS/HR: 500 INJECTION, POWDER, LYOPHILIZED, FOR SOLUTION INTRAVENOUS at 08:29

## 2020-04-02 RX ADMIN — INSULIN LISPRO SCH UNITS: 100 INJECTION, SOLUTION INTRAVENOUS; SUBCUTANEOUS at 17:24

## 2020-04-02 RX ADMIN — POTASSIUM CHLORIDE SCH MLS/HR: 2 INJECTION, SOLUTION, CONCENTRATE INTRAVENOUS at 04:16

## 2020-04-02 RX ADMIN — INSULIN LISPRO SCH UNITS: 100 INJECTION, SOLUTION INTRAVENOUS; SUBCUTANEOUS at 00:04

## 2020-04-02 RX ADMIN — POTASSIUM CHLORIDE SCH MLS/HR: 2 INJECTION, SOLUTION, CONCENTRATE INTRAVENOUS at 13:06

## 2020-04-02 RX ADMIN — Medication PRN EACH: at 13:43

## 2020-04-02 RX ADMIN — DEXTROSE SCH MLS/HR: 50 INJECTION, SOLUTION INTRAVENOUS at 09:11

## 2020-04-02 RX ADMIN — POTASSIUM CHLORIDE SCH MLS/HR: 2 INJECTION, SOLUTION, CONCENTRATE INTRAVENOUS at 13:07

## 2020-04-02 RX ADMIN — DEXMEDETOMIDINE HYDROCHLORIDE PRN MLS/HR: 100 INJECTION, SOLUTION, CONCENTRATE INTRAVENOUS at 08:36

## 2020-04-02 RX ADMIN — INSULIN LISPRO SCH UNITS: 100 INJECTION, SOLUTION INTRAVENOUS; SUBCUTANEOUS at 05:51

## 2020-04-02 RX ADMIN — DEXMEDETOMIDINE HYDROCHLORIDE PRN MLS/HR: 100 INJECTION, SOLUTION, CONCENTRATE INTRAVENOUS at 22:44

## 2020-04-02 RX ADMIN — PANTOPRAZOLE SODIUM SCH MG: 40 INJECTION, POWDER, FOR SOLUTION INTRAVENOUS at 08:29

## 2020-04-02 RX ADMIN — DEXMEDETOMIDINE HYDROCHLORIDE PRN MLS/HR: 100 INJECTION, SOLUTION, CONCENTRATE INTRAVENOUS at 13:37

## 2020-04-02 RX ADMIN — CEFEPIME SCH GM: 2 INJECTION, POWDER, FOR SOLUTION INTRAVENOUS at 21:25

## 2020-04-02 NOTE — PDOC
PROGRESS NOTES


Assessment


Assessment


Respiratory failure.


Seizure.


Metabolic encephalopathy.


Fever 102.7 degree.


Metabolic acidosis.


Diffuse pulmonary infiltrate.


Pleural effusion.


Pancreatitis


Gallstone.


Leukocytosis.


Electrolytes imbalances.


Hyperglycemia.


DM.


HTN.


HLD.


Anemia.


Obesity.





RECOMMENDATIONS/PLAN:


Continue life support in CCU at the present time.


Keppra if has further seizures.


Treat medical diseases.





EEG last week: No seizure activity.





OBJECTIVE:


4/2/20: No seizures reported over night.





Past Medical History


Cardiovascular:  HTN, Hyperlipidemia


Endocrine:  Diabetes





Family History


Unobtainable.





Social HistoryU


not obtainable





Allergies


Coded Allergies:  


Codeine (Verified  Allergy, Intermediate, rash, 3/16/20)





ROS


Unobtainable in unresponsive state.





PHYSICAL EXAMINATION:   


General appearance in sub acute distress.  


HEENT:  Normocephalic and nontraumatic.  Eyes, nose, ears, and throat are 

unremarkable.


Neck is supple. No lymphadenopathy. 


Cardiovascular:  S1, S2.  


Pulmonary:  On vent.. 


Abdomen:  Bowel sounds are weak.  


Extremities:  No rash, lesions. Edema noted in hands.





NEUROLOGICAL  EXAMINATION:


Minimal responsiveness.


On vent.


Not oriented to time, place and person.


PERRL.


EOMI not elicited,


CN: no acute focal findings.


Muscle tone: within normal.


Muscle strength: No movements to stimuli.


DTR: 1


Plantar reflex: No response bilaterally 


Gait: not able to walk.


Sensory exam: no response to stimuli..


Not able to access cerebellar signs.


F-T-N test not performed due to unresponsiveness





Objective


Objective





Vital Signs








  Date Time  Temp Pulse Resp B/P (MAP) Pulse Ox O2 Delivery O2 Flow Rate FiO2


 


4/2/20 12:29     100 Ventilator  


 


4/2/20 12:00 99.6 94 25 119/71 (87)    





 99.6       


 


4/2/20 05:28       6.0 














Intake and Output 


 


 4/2/20





 07:00


 


Intake Total 2370 ml


 


Output Total 855 ml


 


Balance 1515 ml


 


 


 


IV Total 2370 ml


 


Output Urine Total 555 ml


 


Stool Total 150 ml


 


Gastric Drainage Total 150 ml











Vitals Signs


Vitals





                          VS - Last 72 Hours, by Label








  Date Time  Temp Pulse Resp B/P (MAP) Pulse Ox O2 Delivery O2 Flow Rate FiO2


 


4/2/20 12:29     100 Ventilator  


 


4/2/20 12:00 99.6 94 25 119/71 (87) 100 Ventilator  





 99.6       


 


4/2/20 11:35      Mechanical Ventilator  


 


4/2/20 11:00  92 25 115/66 (82) 100 Ventilator  


 


4/2/20 10:00  97 25 94/64 (74) 100 Ventilator  


 


4/2/20 09:00  94 25 144/85 (104) 100 Ventilator  


 


4/2/20 08:00 100.2 121 25 136/72 (93) 100 Ventilator  





 100.2       


 


4/2/20 07:53      Mechanical Ventilator  


 


4/2/20 07:52     100 Ventilator  


 


4/2/20 07:00  115 25 109/62 (78) 100 Ventilator  


 


4/2/20 06:00  118 25 122/74 (90) 100 Ventilator  


 


4/2/20 05:28   24  100 Ventilator 6.0 


 


4/2/20 05:07     100 Ventilator  


 


4/2/20 05:00  116 24 122/62 (82) 100 Ventilator  


 


4/2/20 04:00 100.1 120 24 120/67 (84) 100 Ventilator  





 100.1       


 


4/2/20 03:56      Mechanical Ventilator  


 


4/2/20 03:00  114 24 118/67 (84) 100 Ventilator  


 


4/2/20 02:09     100 Ventilator  


 


4/2/20 02:00  114 26 119/63 (81) 100 Ventilator  


 


4/2/20 01:00  109 25 112/84 (93) 100 Ventilator  


 


4/2/20 00:00 98.2 109 25 102/69 (80) 100 Ventilator  





 98.2       


 


4/2/20 00:00      Mechanical Ventilator  


 


4/1/20 23:00  104 27 119/50 (73) 100 Ventilator  


 


4/1/20 22:54     100 Ventilator  


 


4/1/20 22:00  104 26 115/81 (92) 100 Ventilator  


 


4/1/20 21:00  112 25 129/72 (91) 100 Ventilator  


 


4/1/20 20:00      Mechanical Ventilator 6.0 


 


4/1/20 20:00 98.1 104 25 116/63 (80) 100 Ventilator  





 98.1       


 


4/1/20 19:00  107 25 107/59 (75) 100 Ventilator  


 


4/1/20 18:00  97 25 106/62 (77) 100 Ventilator  


 


4/1/20 17:00  101 25 107/67 (80) 100 Ventilator  


 


4/1/20 16:57     100 Ventilator  


 


4/1/20 16:00 99.0 110 25 106/64 (78) 100 Ventilator  





 99.0       


 


4/1/20 15:41      Mechanical Ventilator  


 


4/1/20 15:00  110 25 93/65 (74) 100 Ventilator  


 


4/1/20 14:00  99 25 103/65 (78) 100 Ventilator  


 


4/1/20 13:00  103 25 98/58 (71) 100 Ventilator  


 


4/1/20 12:35     100 Ventilator  


 


4/1/20 12:00 100.1 106 25 104/63 (77) 100 Ventilator  





 100.1       


 


4/1/20 11:35      Mechanical Ventilator  


 


4/1/20 11:00  109 25 124/57 (79) 100 Ventilator  


 


4/1/20 10:00  107 25 122/69 (86) 100 Ventilator  


 


4/1/20 09:00  106 25 93/67 (76) 100 Ventilator  


 


4/1/20 08:50     100 Ventilator  


 


4/1/20 08:17     100 Ventilator  


 


4/1/20 08:00 98.4 108 25 118/57 (77) 100 Ventilator  





 98.4       


 


4/1/20 07:51     100 Ventilator  


 


4/1/20 07:38      Mechanical Ventilator  


 


4/1/20 07:00  107 25 103/65 (78) 100 Ventilator  











Laboratory


Laboratory





Laboratory Tests








Test


 4/1/20


16:00 4/2/20


00:01 4/2/20


05:30 4/2/20


05:48


 


White Blood Count


 22.6 x10^3/uL


(4.0-11.0) 


 


 





 


Red Blood Count


 2.75 x10^6/uL


(3.50-5.40) 


 


 





 


Hemoglobin


 8.9 g/dL


(12.0-15.5) 


 


 





 


Hematocrit


 26.9 %


(36.0-47.0) 


 


 





 


Mean Corpuscular Volume 98 fL ()    


 


Mean Corpuscular Hemoglobin 32 pg (25-35)    


 


Mean Corpuscular Hemoglobin


Concent 33 g/dL


(31-37) 


 


 





 


Red Cell Distribution Width


 17.1 %


(11.5-14.5) 


 


 





 


Platelet Count


 257 x10^3/uL


(140-400) 


 


 





 


Neutrophils (%) (Auto) 82 % (31-73)    


 


Lymphocytes (%) (Auto) 10 % (24-48)    


 


Monocytes (%) (Auto) 6 % (0-9)    


 


Eosinophils (%) (Auto) 2 % (0-3)    


 


Basophils (%) (Auto) 1 % (0-3)    


 


Neutrophils # (Auto)


 18.5 x10^3/uL


(1.8-7.7) 


 


 





 


Lymphocytes # (Auto)


 2.2 x10^3/uL


(1.0-4.8) 


 


 





 


Monocytes # (Auto)


 1.3 x10^3/uL


(0.0-1.1) 


 


 





 


Eosinophils # (Auto)


 0.5 x10^3/uL


(0.0-0.7) 


 


 





 


Basophils # (Auto)


 0.1 x10^3/uL


(0.0-0.2) 


 


 





 


Sodium Level


 137 mmol/L


(136-145) 138 mmol/L


(136-145) 138 mmol/L


(136-145) 





 


Potassium Level


 3.9 mmol/L


(3.5-5.1) 3.8 mmol/L


(3.5-5.1) 3.7 mmol/L


(3.5-5.1) 





 


Chloride Level


 104 mmol/L


() 105 mmol/L


() 105 mmol/L


() 





 


Carbon Dioxide Level


 28 mmol/L


(21-32) 27 mmol/L


(21-32) 25 mmol/L


(21-32) 





 


Anion Gap 5 (6-14)  6 (6-14)  8 (6-14)  


 


Blood Urea Nitrogen


 30 mg/dL


(7-20) 33 mg/dL


(7-20) 38 mg/dL


(7-20) 





 


Creatinine


 1.1 mg/dL


(0.6-1.0) 1.2 mg/dL


(0.6-1.0) 1.3 mg/dL


(0.6-1.0) 





 


Estimated GFR


(Cockcroft-Gault) 52.8 


 47.7 


 43.5 


 





 


Glucose Level


 170 mg/dL


(70-99) 173 mg/dL


(70-99) 180 mg/dL


(70-99) 





 


Calcium Level


 8.2 mg/dL


(8.5-10.1) 8.0 mg/dL


(8.5-10.1) 8.0 mg/dL


(8.5-10.1) 





 


Phosphorus Level


 2.9 mg/dL


(2.6-4.7) 3.2 mg/dL


(2.6-4.7) 3.1 mg/dL


(2.6-4.7) 





 


Magnesium Level


 2.3 mg/dL


(1.8-2.4) 2.0 mg/dL


(1.8-2.4) 2.0 mg/dL


(1.8-2.4) 





 


Glucose (Fingerstick)


 


 


 


 168 mg/dL


(70-99)








Microbiology


3/24/20 Blood Culture - Final, Complete


          NO GROWTH AFTER 5 DAYS





Medication


Medications





Current Medications


Atropine Sulfate (ATROPINE 0.5mg SYRINGE) 0.5 mg PRN Q5MIN  PRN IV SEE COMMENTS;

 Start 4/2/20 at 08:15


Dexmedetomidine HCl 400 mcg/ Sodium Chloride 100 ml @ 0 mls/hr CONT  PRN IV 

ANXIETY / AGITATION Last administered on 4/2/20at 08:36;  Start 4/2/20 at 08:15


Furosemide (Lasix) 20 mg 1X  ONCE IVP  Last administered on 4/2/20at 08:19;  

Start 4/2/20 at 08:15;  Stop 4/2/20 at 08:16;  Status DC


Lidocaine HCl (Buffered Lidocaine 1%) 3 ml STK-MED ONCE .ROUTE ;  Start 4/2/20 

at 08:39;  Stop 4/2/20 at 08:39;  Status DC


Lidocaine HCl (Buffered Lidocaine 1%) 6 ml 1X  ONCE INJ  Last administered on 

4/2/20at 09:05;  Start 4/2/20 at 09:00;  Stop 4/2/20 at 09:06;  Status DC


Sodium Chloride 500 ml @  500 mls/hr 1X PRN  PRN IV ELEVATED BP, SEE COMMENTS;  

Start 4/2/20 at 08:15


Sodium Chloride 90 meq/Potassium Chloride 15 meq/ Potassium Phosphate 18 mmol/ 

Magnesium Sulfate 8 meq/Calcium Gluconate 15 meq/ Multivitamins 10 ml/Chromium/ 

Copper/Manganese/ Seleni/Zn 0.5 ml/ Insulin Human Regular 20 unit/ Total 

Parenteral Nutrition/Amino Acids/Dextrose/ Fat Emulsion Intravenous 1,400 ml @  

58.333 mls/ hr TPN  CONT IV  Last administered on 4/1/20at 21:30;  Start 4/1/20 

at 22:00;  Stop 4/2/20 at 21:59


Sodium Chloride 90 meq/Potassium Chloride 15 meq/ Potassium Phosphate 18 mmol/ 

Magnesium Sulfate 8 meq/Calcium Gluconate 15 meq/ Multivitamins 10 ml/Chromium/ 

Copper/Manganese/ Seleni/Zn 0.5 ml/ Insulin Human Regular 20 unit/ Total 

Parenteral Nutrition/Amino Acids/Dextrose/ Fat Emulsion Intravenous 1,400 ml @  

58.333 mls/ hr TPN  CONT IV ;  Start 4/2/20 at 22:00;  Stop 4/3/20 at 21:59





Comment


Review of Relevant


I have reviewed the following items josy (where applicable) has been applied.











DORYS LANDA MD                  Apr 2, 2020 13:04

## 2020-04-02 NOTE — PDOC
Infectious Disease Note


Subjective


Subjective


intubated sedated





ROS


ROS


no n/v/d/





Vital Sign


Vital Signs





Vital Signs








  Date Time  Temp Pulse Resp B/P (MAP) Pulse Ox O2 Delivery O2 Flow Rate FiO2


 


4/2/20 08:00 100.2 121 25 136/72 (93) 100 Ventilator  





 100.2       


 


4/2/20 05:28       6.0 











Physical Exam


PHYSICAL EXAM


GENERAL: Orally intubated/sedated - generalizd anasarca 


HEENT: Mild icterus, pupils equal, ETT, NGT


NECK:  Supple. 


LUNGS:  Decreased breath sounds at the bases.


HEART:  S1, S2, regular 


ABDOMEN:  Distended, hypoactive BS, Rectal tube in place 


: [place]   


EXTREMITIES: Generalized edema, no cyanosis - some mottling, [place] boots 

bilaterally 


DERMATOLOGIC:  Warm and dry.  No generalized rash.  


CENTRAL NERVOUS SYSTEM: Sedated 


RIJ Temp HDC, RUE-PICC & LIJ - clean





Labs


Lab





Laboratory Tests








Test


 4/1/20


11:29 4/1/20


11:30 4/1/20


16:00 4/2/20


00:01


 


Glucose (Fingerstick)


 163 mg/dL


(70-99) 


 


 





 


Sodium Level


 


 137 mmol/L


(136-145) 137 mmol/L


(136-145) 138 mmol/L


(136-145)


 


Potassium Level


 


 3.7 mmol/L


(3.5-5.1) 3.9 mmol/L


(3.5-5.1) 3.8 mmol/L


(3.5-5.1)


 


Chloride Level


 


 104 mmol/L


() 104 mmol/L


() 105 mmol/L


()


 


Carbon Dioxide Level


 


 29 mmol/L


(21-32) 28 mmol/L


(21-32) 27 mmol/L


(21-32)


 


Anion Gap  4 (6-14)  5 (6-14)  6 (6-14) 


 


Blood Urea Nitrogen


 


 31 mg/dL


(7-20) 30 mg/dL


(7-20) 33 mg/dL


(7-20)


 


Creatinine


 


 1.1 mg/dL


(0.6-1.0) 1.1 mg/dL


(0.6-1.0) 1.2 mg/dL


(0.6-1.0)


 


Estimated GFR


(Cockcroft-Gault) 


 52.8 


 52.8 


 47.7 





 


Glucose Level


 


 165 mg/dL


(70-99) 170 mg/dL


(70-99) 173 mg/dL


(70-99)


 


Calcium Level


 


 7.2 mg/dL


(8.5-10.1) 8.2 mg/dL


(8.5-10.1) 8.0 mg/dL


(8.5-10.1)


 


Phosphorus Level


 


 2.9 mg/dL


(2.6-4.7) 2.9 mg/dL


(2.6-4.7) 3.2 mg/dL


(2.6-4.7)


 


Magnesium Level


 


 2.0 mg/dL


(1.8-2.4) 2.3 mg/dL


(1.8-2.4) 2.0 mg/dL


(1.8-2.4)


 


White Blood Count


 


 


 22.6 x10^3/uL


(4.0-11.0) 





 


Red Blood Count


 


 


 2.75 x10^6/uL


(3.50-5.40) 





 


Hemoglobin


 


 


 8.9 g/dL


(12.0-15.5) 





 


Hematocrit


 


 


 26.9 %


(36.0-47.0) 





 


Mean Corpuscular Volume   98 fL ()  


 


Mean Corpuscular Hemoglobin   32 pg (25-35)  


 


Mean Corpuscular Hemoglobin


Concent 


 


 33 g/dL


(31-37) 





 


Red Cell Distribution Width


 


 


 17.1 %


(11.5-14.5) 





 


Platelet Count


 


 


 257 x10^3/uL


(140-400) 





 


Neutrophils (%) (Auto)   82 % (31-73)  


 


Lymphocytes (%) (Auto)   10 % (24-48)  


 


Monocytes (%) (Auto)   6 % (0-9)  


 


Eosinophils (%) (Auto)   2 % (0-3)  


 


Basophils (%) (Auto)   1 % (0-3)  


 


Neutrophils # (Auto)


 


 


 18.5 x10^3/uL


(1.8-7.7) 





 


Lymphocytes # (Auto)


 


 


 2.2 x10^3/uL


(1.0-4.8) 





 


Monocytes # (Auto)


 


 


 1.3 x10^3/uL


(0.0-1.1) 





 


Eosinophils # (Auto)


 


 


 0.5 x10^3/uL


(0.0-0.7) 





 


Basophils # (Auto)


 


 


 0.1 x10^3/uL


(0.0-0.2) 





 


Test


 4/2/20


05:30 4/2/20


05:48 


 





 


Sodium Level


 138 mmol/L


(136-145) 


 


 





 


Potassium Level


 3.7 mmol/L


(3.5-5.1) 


 


 





 


Chloride Level


 105 mmol/L


() 


 


 





 


Carbon Dioxide Level


 25 mmol/L


(21-32) 


 


 





 


Anion Gap 8 (6-14)    


 


Blood Urea Nitrogen


 38 mg/dL


(7-20) 


 


 





 


Creatinine


 1.3 mg/dL


(0.6-1.0) 


 


 





 


Estimated GFR


(Cockcroft-Gault) 43.5 


 


 


 





 


Glucose Level


 180 mg/dL


(70-99) 


 


 





 


Calcium Level


 8.0 mg/dL


(8.5-10.1) 


 


 





 


Phosphorus Level


 3.1 mg/dL


(2.6-4.7) 


 


 





 


Magnesium Level


 2.0 mg/dL


(1.8-2.4) 


 


 





 


Glucose (Fingerstick)


 


 168 mg/dL


(70-99) 


 











Micro





Microbiology


3/24/20 Blood Culture - Final, Complete


          NO GROWTH AFTER 5 DAYS





Objective


Assessment


Leukocytosis - - ? reactive - trouble with CRRT/S/p PRBCs ? intra-abdominal 


Fever - better - Flu neg antigen 3/26 ? reactive with pancreatitis vs ID - C-

diff neg. S/p HD cath change with malfunction 3/25 - cults 3/24 neg


Lung opacities, COVID-19 neg 


Hypotension 


Acute pancreatitis, early developing necrosis -U/S 3/26 reviewed


JED,Hyperkalemia, Metabolic acidosis on CRRT


   - s/p RIJ temporary dialysis catheter replacement, 3/25


Anasarca - worse


Acute hypoxic resp failure, intubated


? developing peripheral ischemia - better


Cholelithiasis


Anemia - S/p PRBC 3/26


Hypocalcemia 


Prediabetes


HTN





Plan


Plan of Care


Continue Dapto/cefepime (3/25), Flagyl and micafungin (3/23) 


   -Previously on Merrem, Zyvox 


F/u Blood cults 3/24 so far neg





No surgical plans at this time


Maintain aspiration precautions 


Airborne isolation d/c'd. COVID-19 neg  


Repeat CBC





need ct chest, abd and pelvis,,, noted


d/w GI


D/w nursing





Critically ill











LINN FRANZ MD                Apr 2, 2020 08:25

## 2020-04-02 NOTE — NUR
Dr. Castano called for update on patient, received the order to start precedex and wean off 
versed and try 20 mg IV lasix one time if cleared by Dr. Gordillo. Dialysis RN then called unit 
to check on patient, notified of stopping CRRT last night due to access problems. Dialysis 
RN spoke with Dr. Gordillo regarding access, received the order to replace TDC, will attempt to 
hold off on CRRT today, and go ahead and give the 20 mg IV lasix. Reached out to Beth, 
daughter, to sign consent. Will continue to monitor.

## 2020-04-02 NOTE — RAD
PORTABLE CHEST 1V 

 

INDICATION: Respiratory failure. 

 

COMPARISON STUDY: 4/1/2020.

 

FINDINGS:

 

Life Support Devices: Stable endotracheal tube, enteric tube, right IJ 

dual-lumen catheter, left IJ central venous catheter.

 

Lungs: Low lung volume. Stable diffuse hazy opacities. 

 

Pleura: Stable small bilateral pleural effusions.

 

Heart and Mediastinum: Stable cardiomediastinal silhouette and great 

vessels. 

 

Bones and Soft Tissues: Stable regional skeleton and soft tissues.

 

IMPRESSION:  

 

Stable small bilateral pleural effusions and diffuse hazy opacities.

 

Electronically signed by: Anival Moody MD (4/2/2020 7:21 AM) [id number]

## 2020-04-02 NOTE — NUR
Pharmacy TPN Dosing Note



S: JESENIA RICH is a 49 year old F Currently receiving Central Continuous TPN started 
03/18/20



B:Pertinent PMH: Necrotizing pancreatitis



Height: 5 feet, 8 inches

Weight: 111.0 kg



Current diet: NPO 



LABS:

Sodium:    138 

Potassium: 3.7 

Chloride:  105 

Calcium:   8.0 

Corrected Calcium: 9.28 

Magnesium: 2.0 

CO2:       25 

SCr:       1.3 

Glucose:   180 

Albumin:   2.4 

AST:       46 

ALT:       20 



TPN FORMULA:

TPN TYPE:  Central Continuous

AMINO ACIDS:         125 gm

DEXTROSE:            195 gm

LIPIDS:              40 gm

SODIUM CHLORIDE:     90 mEq

POTASSIUM CHLORIDE:  15 mEq

POTASSIUM PHOSPHATE: 18 mmol

MAGNESIUM:           8 mEq

CALCIUM:             15 mEq

INSULIN:             20 units

MULTIPLE VITAMIN:    10 ml

TRACE ELEMENTS:      0.5 ml



TPN PLAN:  

-Electrolytes appear stable today; of note, CRRT stopped on 4/2/20.

-Possible resumption on 4/3/20.

-BMP, mag, phos tomorrow due to CRRT disruption.





R: Continue TPN @ current rate and above formula. 

Will monitor electrolytes, glucose, and tolerance to TPN.



 VALERIE SNELL [facility], 04/02/20 [id number]

## 2020-04-02 NOTE — PDOC
SAURAV NASH APRN 4/2/20 0920:


SURGICAL PROGRESS NOTE


Subjective


d/w nursing


dialysis cath replaced, hold CRRT today-labs in AM


Vital Signs





Vital Signs








  Date Time  Temp Pulse Resp B/P (MAP) Pulse Ox O2 Delivery O2 Flow Rate FiO2


 


4/2/20 09:00  94 25 144/85 (104) 100 Ventilator  


 


4/2/20 08:00 100.2       





 100.2       


 


4/2/20 05:28       6.0 








I&O











Intake and Output 


 


 4/2/20





 07:00


 


Intake Total 2370 ml


 


Output Total 855 ml


 


Balance 1515 ml


 


 


 


IV Total 2370 ml


 


Output Urine Total 555 ml


 


Stool Total 150 ml


 


Gastric Drainage Total 150 ml








PATIENT HAS A VILLASENOR:  Yes


General:  Other (sedated )


Abdomen:  Other (firm)


Labs





Laboratory Tests








Test


 3/31/20


12:00 3/31/20


17:30 3/31/20


17:40 4/1/20


00:10


 


Prothrombin Time


 21.3 SEC


(11.7-14.0) 


 


 





 


Prothromb Time International


Ratio 1.9 (0.8-1.1) 


 


 


 





 


Sodium Level


 138 mmol/L


(136-145) 


 138 mmol/L


(136-145) 





 


Potassium Level


 3.9 mmol/L


(3.5-5.1) 


 4.1 mmol/L


(3.5-5.1) 





 


Chloride Level


 103 mmol/L


() 


 104 mmol/L


() 





 


Carbon Dioxide Level


 27 mmol/L


(21-32) 


 28 mmol/L


(21-32) 





 


Anion Gap 8 (6-14)   6 (6-14)  


 


Blood Urea Nitrogen


 33 mg/dL


(7-20) 


 33 mg/dL


(7-20) 





 


Creatinine


 1.2 mg/dL


(0.6-1.0) 


 1.2 mg/dL


(0.6-1.0) 





 


Estimated GFR


(Cockcroft-Gault) 47.7 


 


 47.7 


 





 


Glucose Level


 192 mg/dL


(70-99) 


 185 mg/dL


(70-99) 





 


Calcium Level


 8.0 mg/dL


(8.5-10.1) 


 8.1 mg/dL


(8.5-10.1) 





 


Phosphorus Level


 2.7 mg/dL


(2.6-4.7) 


 3.1 mg/dL


(2.6-4.7) 





 


Magnesium Level


 2.2 mg/dL


(1.8-2.4) 


 2.3 mg/dL


(1.8-2.4) 





 


White Blood Count


 


 37.4 x10^3/uL


(4.0-11.0) 


 





 


Red Blood Count


 


 2.89 x10^6/uL


(3.50-5.40) 


 





 


Hemoglobin


 


 9.3 g/dL


(12.0-15.5) 


 





 


Hematocrit


 


 28.0 %


(36.0-47.0) 


 





 


Mean Corpuscular Volume  97 fL ()   


 


Mean Corpuscular Hemoglobin  32 pg (25-35)   


 


Mean Corpuscular Hemoglobin


Concent 


 33 g/dL


(31-37) 


 





 


Red Cell Distribution Width


 


 17.1 %


(11.5-14.5) 


 





 


Platelet Count


 


 307 x10^3/uL


(140-400) 


 





 


Neutrophils (%) (Auto)  86 % (31-73)   


 


Lymphocytes (%) (Auto)  6 % (24-48)   


 


Monocytes (%) (Auto)  5 % (0-9)   


 


Eosinophils (%) (Auto)  2 % (0-3)   


 


Basophils (%) (Auto)  1 % (0-3)   


 


Neutrophils # (Auto)


 


 32.1 x10^3/uL


(1.8-7.7) 


 





 


Lymphocytes # (Auto)


 


 2.4 x10^3/uL


(1.0-4.8) 


 





 


Monocytes # (Auto)


 


 1.9 x10^3/uL


(0.0-1.1) 


 





 


Eosinophils # (Auto)


 


 0.8 x10^3/uL


(0.0-0.7) 


 





 


Basophils # (Auto)


 


 0.2 x10^3/uL


(0.0-0.2) 


 





 


BUN/Creatinine Ratio   28 (6-20)  


 


Total Bilirubin


 


 


 1.0 mg/dL


(0.2-1.0) 





 


Aspartate Amino Transf


(AST/SGOT) 


 


 50 U/L (15-37) 


 





 


Alanine Aminotransferase


(ALT/SGPT) 


 


 21 U/L (14-59) 


 





 


Alkaline Phosphatase


 


 


 128 U/L


() 





 


Total Protein


 


 


 5.3 g/dL


(6.4-8.2) 





 


Albumin


 


 


 2.5 g/dL


(3.4-5.0) 





 


Albumin/Globulin Ratio   0.9 (1.0-1.7)  


 


Glucose (Fingerstick)


 


 


 


 170 mg/dL


(70-99)


 


Test


 4/1/20


00:15 4/1/20


05:30 4/1/20


05:37 4/1/20


08:00


 


Sodium Level


 137 mmol/L


(136-145) 138 mmol/L


(136-145) 


 





 


Potassium Level


 4.1 mmol/L


(3.5-5.1) 4.0 mmol/L


(3.5-5.1) 


 





 


Chloride Level


 103 mmol/L


() 104 mmol/L


() 


 





 


Carbon Dioxide Level


 28 mmol/L


(21-32) 28 mmol/L


(21-32) 


 





 


Anion Gap 6 (6-14)  6 (6-14)   


 


Blood Urea Nitrogen


 32 mg/dL


(7-20) 31 mg/dL


(7-20) 


 





 


Creatinine


 1.1 mg/dL


(0.6-1.0) 1.1 mg/dL


(0.6-1.0) 


 





 


Estimated GFR


(Cockcroft-Gault) 52.8 


 52.8 


 


 





 


Glucose Level


 181 mg/dL


(70-99) 177 mg/dL


(70-99) 


 





 


Calcium Level


 8.0 mg/dL


(8.5-10.1) 8.2 mg/dL


(8.5-10.1) 


 





 


Phosphorus Level


 2.9 mg/dL


(2.6-4.7) 2.9 mg/dL


(2.6-4.7) 


 





 


Magnesium Level


 2.1 mg/dL


(1.8-2.4) 2.3 mg/dL


(1.8-2.4) 


 





 


White Blood Count


 


 30.1 x10^3/uL


(4.0-11.0) 


 





 


Red Blood Count


 


 2.81 x10^6/uL


(3.50-5.40) 


 





 


Hemoglobin


 


 9.1 g/dL


(12.0-15.5) 


 





 


Hematocrit


 


 27.5 %


(36.0-47.0) 


 





 


Mean Corpuscular Volume  98 fL ()   


 


Mean Corpuscular Hemoglobin  32 pg (25-35)   


 


Mean Corpuscular Hemoglobin


Concent 


 33 g/dL


(31-37) 


 





 


Red Cell Distribution Width


 


 16.8 %


(11.5-14.5) 


 





 


Platelet Count


 


 281 x10^3/uL


(140-400) 


 





 


Neutrophils (%) (Auto)  81 % (31-73)   


 


Lymphocytes (%) (Auto)  10 % (24-48)   


 


Monocytes (%) (Auto)  6 % (0-9)   


 


Eosinophils (%) (Auto)  2 % (0-3)   


 


Basophils (%) (Auto)  1 % (0-3)   


 


Neutrophils # (Auto)


 


 24.4 x10^3/uL


(1.8-7.7) 


 





 


Lymphocytes # (Auto)


 


 3.0 x10^3/uL


(1.0-4.8) 


 





 


Monocytes # (Auto)


 


 1.7 x10^3/uL


(0.0-1.1) 


 





 


Eosinophils # (Auto)


 


 0.7 x10^3/uL


(0.0-0.7) 


 





 


Basophils # (Auto)


 


 0.2 x10^3/uL


(0.0-0.2) 


 





 


BUN/Creatinine Ratio  28 (6-20)   


 


Total Bilirubin


 


 1.0 mg/dL


(0.2-1.0) 


 





 


Aspartate Amino Transf


(AST/SGOT) 


 46 U/L (15-37) 


 


 





 


Alanine Aminotransferase


(ALT/SGPT) 


 20 U/L (14-59) 


 


 





 


Alkaline Phosphatase


 


 119 U/L


() 


 





 


Total Protein


 


 5.2 g/dL


(6.4-8.2) 


 





 


Albumin


 


 2.4 g/dL


(3.4-5.0) 


 





 


Albumin/Globulin Ratio  0.9 (1.0-1.7)   


 


Glucose (Fingerstick)


 


 


 182 mg/dL


(70-99) 





 


O2 Saturation    95 % (92-99) 


 


Arterial Blood pH


 


 


 


 7.34


(7.35-7.45)


 


Arterial Blood pCO2 at


Patient Temp 


 


 


 41 mmHg


(35-46)


 


Arterial Blood pO2 at Patient


Temp 


 


 


 83 mmHg


()


 


Arterial Blood HCO3


 


 


 


 22 mmol/L


(21-28)


 


Arterial Blood Base Excess


 


 


 


 -4 mmol/L


(-3-3)


 


FiO2    30% 


 


Test


 4/1/20


11:29 4/1/20


11:30 4/1/20


16:00 4/2/20


00:01


 


Glucose (Fingerstick)


 163 mg/dL


(70-99) 


 


 





 


Sodium Level


 


 137 mmol/L


(136-145) 137 mmol/L


(136-145) 138 mmol/L


(136-145)


 


Potassium Level


 


 3.7 mmol/L


(3.5-5.1) 3.9 mmol/L


(3.5-5.1) 3.8 mmol/L


(3.5-5.1)


 


Chloride Level


 


 104 mmol/L


() 104 mmol/L


() 105 mmol/L


()


 


Carbon Dioxide Level


 


 29 mmol/L


(21-32) 28 mmol/L


(21-32) 27 mmol/L


(21-32)


 


Anion Gap  4 (6-14)  5 (6-14)  6 (6-14) 


 


Blood Urea Nitrogen


 


 31 mg/dL


(7-20) 30 mg/dL


(7-20) 33 mg/dL


(7-20)


 


Creatinine


 


 1.1 mg/dL


(0.6-1.0) 1.1 mg/dL


(0.6-1.0) 1.2 mg/dL


(0.6-1.0)


 


Estimated GFR


(Cockcroft-Gault) 


 52.8 


 52.8 


 47.7 





 


Glucose Level


 


 165 mg/dL


(70-99) 170 mg/dL


(70-99) 173 mg/dL


(70-99)


 


Calcium Level


 


 7.2 mg/dL


(8.5-10.1) 8.2 mg/dL


(8.5-10.1) 8.0 mg/dL


(8.5-10.1)


 


Phosphorus Level


 


 2.9 mg/dL


(2.6-4.7) 2.9 mg/dL


(2.6-4.7) 3.2 mg/dL


(2.6-4.7)


 


Magnesium Level


 


 2.0 mg/dL


(1.8-2.4) 2.3 mg/dL


(1.8-2.4) 2.0 mg/dL


(1.8-2.4)


 


White Blood Count


 


 


 22.6 x10^3/uL


(4.0-11.0) 





 


Red Blood Count


 


 


 2.75 x10^6/uL


(3.50-5.40) 





 


Hemoglobin


 


 


 8.9 g/dL


(12.0-15.5) 





 


Hematocrit


 


 


 26.9 %


(36.0-47.0) 





 


Mean Corpuscular Volume   98 fL ()  


 


Mean Corpuscular Hemoglobin   32 pg (25-35)  


 


Mean Corpuscular Hemoglobin


Concent 


 


 33 g/dL


(31-37) 





 


Red Cell Distribution Width


 


 


 17.1 %


(11.5-14.5) 





 


Platelet Count


 


 


 257 x10^3/uL


(140-400) 





 


Neutrophils (%) (Auto)   82 % (31-73)  


 


Lymphocytes (%) (Auto)   10 % (24-48)  


 


Monocytes (%) (Auto)   6 % (0-9)  


 


Eosinophils (%) (Auto)   2 % (0-3)  


 


Basophils (%) (Auto)   1 % (0-3)  


 


Neutrophils # (Auto)


 


 


 18.5 x10^3/uL


(1.8-7.7) 





 


Lymphocytes # (Auto)


 


 


 2.2 x10^3/uL


(1.0-4.8) 





 


Monocytes # (Auto)


 


 


 1.3 x10^3/uL


(0.0-1.1) 





 


Eosinophils # (Auto)


 


 


 0.5 x10^3/uL


(0.0-0.7) 





 


Basophils # (Auto)


 


 


 0.1 x10^3/uL


(0.0-0.2) 





 


Test


 4/2/20


05:30 4/2/20


05:48 


 





 


Sodium Level


 138 mmol/L


(136-145) 


 


 





 


Potassium Level


 3.7 mmol/L


(3.5-5.1) 


 


 





 


Chloride Level


 105 mmol/L


() 


 


 





 


Carbon Dioxide Level


 25 mmol/L


(21-32) 


 


 





 


Anion Gap 8 (6-14)    


 


Blood Urea Nitrogen


 38 mg/dL


(7-20) 


 


 





 


Creatinine


 1.3 mg/dL


(0.6-1.0) 


 


 





 


Estimated GFR


(Cockcroft-Gault) 43.5 


 


 


 





 


Glucose Level


 180 mg/dL


(70-99) 


 


 





 


Calcium Level


 8.0 mg/dL


(8.5-10.1) 


 


 





 


Phosphorus Level


 3.1 mg/dL


(2.6-4.7) 


 


 





 


Magnesium Level


 2.0 mg/dL


(1.8-2.4) 


 


 





 


Glucose (Fingerstick)


 


 168 mg/dL


(70-99) 


 











Laboratory Tests








Test


 4/1/20


11:29 4/1/20


11:30 4/1/20


16:00 4/2/20


00:01


 


Glucose (Fingerstick)


 163 mg/dL


(70-99) 


 


 





 


Sodium Level


 


 137 mmol/L


(136-145) 137 mmol/L


(136-145) 138 mmol/L


(136-145)


 


Potassium Level


 


 3.7 mmol/L


(3.5-5.1) 3.9 mmol/L


(3.5-5.1) 3.8 mmol/L


(3.5-5.1)


 


Chloride Level


 


 104 mmol/L


() 104 mmol/L


() 105 mmol/L


()


 


Carbon Dioxide Level


 


 29 mmol/L


(21-32) 28 mmol/L


(21-32) 27 mmol/L


(21-32)


 


Anion Gap  4 (6-14)  5 (6-14)  6 (6-14) 


 


Blood Urea Nitrogen


 


 31 mg/dL


(7-20) 30 mg/dL


(7-20) 33 mg/dL


(7-20)


 


Creatinine


 


 1.1 mg/dL


(0.6-1.0) 1.1 mg/dL


(0.6-1.0) 1.2 mg/dL


(0.6-1.0)


 


Estimated GFR


(Cockcroft-Gault) 


 52.8 


 52.8 


 47.7 





 


Glucose Level


 


 165 mg/dL


(70-99) 170 mg/dL


(70-99) 173 mg/dL


(70-99)


 


Calcium Level


 


 7.2 mg/dL


(8.5-10.1) 8.2 mg/dL


(8.5-10.1) 8.0 mg/dL


(8.5-10.1)


 


Phosphorus Level


 


 2.9 mg/dL


(2.6-4.7) 2.9 mg/dL


(2.6-4.7) 3.2 mg/dL


(2.6-4.7)


 


Magnesium Level


 


 2.0 mg/dL


(1.8-2.4) 2.3 mg/dL


(1.8-2.4) 2.0 mg/dL


(1.8-2.4)


 


White Blood Count


 


 


 22.6 x10^3/uL


(4.0-11.0) 





 


Red Blood Count


 


 


 2.75 x10^6/uL


(3.50-5.40) 





 


Hemoglobin


 


 


 8.9 g/dL


(12.0-15.5) 





 


Hematocrit


 


 


 26.9 %


(36.0-47.0) 





 


Mean Corpuscular Volume   98 fL ()  


 


Mean Corpuscular Hemoglobin   32 pg (25-35)  


 


Mean Corpuscular Hemoglobin


Concent 


 


 33 g/dL


(31-37) 





 


Red Cell Distribution Width


 


 


 17.1 %


(11.5-14.5) 





 


Platelet Count


 


 


 257 x10^3/uL


(140-400) 





 


Neutrophils (%) (Auto)   82 % (31-73)  


 


Lymphocytes (%) (Auto)   10 % (24-48)  


 


Monocytes (%) (Auto)   6 % (0-9)  


 


Eosinophils (%) (Auto)   2 % (0-3)  


 


Basophils (%) (Auto)   1 % (0-3)  


 


Neutrophils # (Auto)


 


 


 18.5 x10^3/uL


(1.8-7.7) 





 


Lymphocytes # (Auto)


 


 


 2.2 x10^3/uL


(1.0-4.8) 





 


Monocytes # (Auto)


 


 


 1.3 x10^3/uL


(0.0-1.1) 





 


Eosinophils # (Auto)


 


 


 0.5 x10^3/uL


(0.0-0.7) 





 


Basophils # (Auto)


 


 


 0.1 x10^3/uL


(0.0-0.2) 





 


Test


 4/2/20


05:30 4/2/20


05:48 


 





 


Sodium Level


 138 mmol/L


(136-145) 


 


 





 


Potassium Level


 3.7 mmol/L


(3.5-5.1) 


 


 





 


Chloride Level


 105 mmol/L


() 


 


 





 


Carbon Dioxide Level


 25 mmol/L


(21-32) 


 


 





 


Anion Gap 8 (6-14)    


 


Blood Urea Nitrogen


 38 mg/dL


(7-20) 


 


 





 


Creatinine


 1.3 mg/dL


(0.6-1.0) 


 


 





 


Estimated GFR


(Cockcroft-Gault) 43.5 


 


 


 





 


Glucose Level


 180 mg/dL


(70-99) 


 


 





 


Calcium Level


 8.0 mg/dL


(8.5-10.1) 


 


 





 


Phosphorus Level


 3.1 mg/dL


(2.6-4.7) 


 


 





 


Magnesium Level


 2.0 mg/dL


(1.8-2.4) 


 


 





 


Glucose (Fingerstick)


 


 168 mg/dL


(70-99) 


 











Problem List


Problems


Medical Problems:


(1) Acute pancreatitis


Status: Acute  





(2) Cholelithiasis


Status: Acute  








Assessment/Plan


severe pancreatitis


possible trach 4/6





GAMAL ZHOU MD 4/2/20 1511:


SURGICAL PROGRESS NOTE


Assessment/Plan


Pt seen and examined.


Agree with MsLinden Korey's note


Pt appears relatively stable


abd soft


cont supportive care











KOREYSAURAV KIRAN APRN             Apr 2, 2020 09:20


GAMAL ZHOU MD              Apr 2, 2020 15:11

## 2020-04-02 NOTE — PDOC
Objective:


Objective:


D/w nurse - thick bilious output from OG and from mouth, some discussion re: 

tracheostomy.


Vital Signs:





                                   Vital Signs








  Date Time  Temp Pulse Resp B/P (MAP) Pulse Ox O2 Delivery O2 Flow Rate FiO2


 


4/2/20 09:00  94 25 144/85 (104) 100 Ventilator  


 


4/2/20 08:00 100.2       





 100.2       


 


4/2/20 05:28       6.0 








Labs:





Laboratory Tests








Test


 4/1/20


11:29 4/1/20


11:30 4/1/20


16:00 4/2/20


00:01


 


Glucose (Fingerstick) 163 mg/dL    


 


Sodium Level  137 mmol/L  137 mmol/L  138 mmol/L 


 


Potassium Level  3.7 mmol/L  3.9 mmol/L  3.8 mmol/L 


 


Chloride Level  104 mmol/L  104 mmol/L  105 mmol/L 


 


Carbon Dioxide Level  29 mmol/L  28 mmol/L  27 mmol/L 


 


Anion Gap  4  5  6 


 


Blood Urea Nitrogen  31 mg/dL  30 mg/dL  33 mg/dL 


 


Creatinine  1.1 mg/dL  1.1 mg/dL  1.2 mg/dL 


 


Estimated GFR


(Cockcroft-Gault) 


 52.8 


 52.8 


 47.7 





 


Glucose Level  165 mg/dL  170 mg/dL  173 mg/dL 


 


Calcium Level  7.2 mg/dL  8.2 mg/dL  8.0 mg/dL 


 


Phosphorus Level  2.9 mg/dL  2.9 mg/dL  3.2 mg/dL 


 


Magnesium Level  2.0 mg/dL  2.3 mg/dL  2.0 mg/dL 


 


White Blood Count   22.6 x10^3/uL  


 


Red Blood Count   2.75 x10^6/uL  


 


Hemoglobin   8.9 g/dL  


 


Hematocrit   26.9 %  


 


Mean Corpuscular Volume   98 fL  


 


Mean Corpuscular Hemoglobin   32 pg  


 


Mean Corpuscular Hemoglobin


Concent 


 


 33 g/dL 


 





 


Red Cell Distribution Width   17.1 %  


 


Platelet Count   257 x10^3/uL  


 


Neutrophils (%) (Auto)   82 %  


 


Lymphocytes (%) (Auto)   10 %  


 


Monocytes (%) (Auto)   6 %  


 


Eosinophils (%) (Auto)   2 %  


 


Basophils (%) (Auto)   1 %  


 


Neutrophils # (Auto)   18.5 x10^3/uL  


 


Lymphocytes # (Auto)   2.2 x10^3/uL  


 


Monocytes # (Auto)   1.3 x10^3/uL  


 


Eosinophils # (Auto)   0.5 x10^3/uL  


 


Basophils # (Auto)   0.1 x10^3/uL  


 


Test


 4/2/20


05:30 4/2/20


05:48 


 





 


Sodium Level 138 mmol/L    


 


Potassium Level 3.7 mmol/L    


 


Chloride Level 105 mmol/L    


 


Carbon Dioxide Level 25 mmol/L    


 


Anion Gap 8    


 


Blood Urea Nitrogen 38 mg/dL    


 


Creatinine 1.3 mg/dL    


 


Estimated GFR


(Cockcroft-Gault) 43.5 


 


 


 





 


Glucose Level 180 mg/dL    


 


Calcium Level 8.0 mg/dL    


 


Phosphorus Level 3.1 mg/dL    


 


Magnesium Level 2.0 mg/dL    


 


Glucose (Fingerstick)  168 mg/dL   








Imaging:


CXR 4/2


IMPRESSION:  


Stable small bilateral pleural effusions and diffuse hazy opacities.





PE:





GEN: IR staff present





A/P:


Gallstone pancreatitis, MOSF





--


Having IR procedure/cath replacement when I went by.


Continue support.





Hemodynamically unstable?:  No


Is patient in severe pain?:  No


Is NPO status required?:  Yes











RAGHAVENDRA MURCIA          Apr 2, 2020 10:09

## 2020-04-02 NOTE — PDOC
PULMONARY PROGRESS NOTES


Subjective





Patient intubated on 3/23 , sedated


Currently on assist control ventilation  450 tidal volume 8 of PEEP , 40%FIO2 


fevers


Vitals





Vital Signs








  Date Time  Temp Pulse Resp B/P (MAP) Pulse Ox O2 Delivery O2 Flow Rate FiO2


 


4/2/20 08:00 100.2 121 25 136/72 (93) 100 Ventilator  





 100.2       


 


4/2/20 05:28       6.0 








Comments


intubated , sedated


Lungs:  Other (decrease bs)


Abdomen:  Other (distended)


Extremities:  Other (trace edema)


Labs





Laboratory Tests








Test


 3/31/20


12:00 3/31/20


17:30 3/31/20


17:40 4/1/20


00:10


 


Prothrombin Time


 21.3 SEC


(11.7-14.0) 


 


 





 


Prothromb Time International


Ratio 1.9 (0.8-1.1) 


 


 


 





 


Sodium Level


 138 mmol/L


(136-145) 


 138 mmol/L


(136-145) 





 


Potassium Level


 3.9 mmol/L


(3.5-5.1) 


 4.1 mmol/L


(3.5-5.1) 





 


Chloride Level


 103 mmol/L


() 


 104 mmol/L


() 





 


Carbon Dioxide Level


 27 mmol/L


(21-32) 


 28 mmol/L


(21-32) 





 


Anion Gap 8 (6-14)   6 (6-14)  


 


Blood Urea Nitrogen


 33 mg/dL


(7-20) 


 33 mg/dL


(7-20) 





 


Creatinine


 1.2 mg/dL


(0.6-1.0) 


 1.2 mg/dL


(0.6-1.0) 





 


Estimated GFR


(Cockcroft-Gault) 47.7 


 


 47.7 


 





 


Glucose Level


 192 mg/dL


(70-99) 


 185 mg/dL


(70-99) 





 


Calcium Level


 8.0 mg/dL


(8.5-10.1) 


 8.1 mg/dL


(8.5-10.1) 





 


Phosphorus Level


 2.7 mg/dL


(2.6-4.7) 


 3.1 mg/dL


(2.6-4.7) 





 


Magnesium Level


 2.2 mg/dL


(1.8-2.4) 


 2.3 mg/dL


(1.8-2.4) 





 


White Blood Count


 


 37.4 x10^3/uL


(4.0-11.0) 


 





 


Red Blood Count


 


 2.89 x10^6/uL


(3.50-5.40) 


 





 


Hemoglobin


 


 9.3 g/dL


(12.0-15.5) 


 





 


Hematocrit


 


 28.0 %


(36.0-47.0) 


 





 


Mean Corpuscular Volume  97 fL ()   


 


Mean Corpuscular Hemoglobin  32 pg (25-35)   


 


Mean Corpuscular Hemoglobin


Concent 


 33 g/dL


(31-37) 


 





 


Red Cell Distribution Width


 


 17.1 %


(11.5-14.5) 


 





 


Platelet Count


 


 307 x10^3/uL


(140-400) 


 





 


Neutrophils (%) (Auto)  86 % (31-73)   


 


Lymphocytes (%) (Auto)  6 % (24-48)   


 


Monocytes (%) (Auto)  5 % (0-9)   


 


Eosinophils (%) (Auto)  2 % (0-3)   


 


Basophils (%) (Auto)  1 % (0-3)   


 


Neutrophils # (Auto)


 


 32.1 x10^3/uL


(1.8-7.7) 


 





 


Lymphocytes # (Auto)


 


 2.4 x10^3/uL


(1.0-4.8) 


 





 


Monocytes # (Auto)


 


 1.9 x10^3/uL


(0.0-1.1) 


 





 


Eosinophils # (Auto)


 


 0.8 x10^3/uL


(0.0-0.7) 


 





 


Basophils # (Auto)


 


 0.2 x10^3/uL


(0.0-0.2) 


 





 


BUN/Creatinine Ratio   28 (6-20)  


 


Total Bilirubin


 


 


 1.0 mg/dL


(0.2-1.0) 





 


Aspartate Amino Transf


(AST/SGOT) 


 


 50 U/L (15-37) 


 





 


Alanine Aminotransferase


(ALT/SGPT) 


 


 21 U/L (14-59) 


 





 


Alkaline Phosphatase


 


 


 128 U/L


() 





 


Total Protein


 


 


 5.3 g/dL


(6.4-8.2) 





 


Albumin


 


 


 2.5 g/dL


(3.4-5.0) 





 


Albumin/Globulin Ratio   0.9 (1.0-1.7)  


 


Glucose (Fingerstick)


 


 


 


 170 mg/dL


(70-99)


 


Test


 4/1/20


00:15 4/1/20


05:30 4/1/20


05:37 4/1/20


08:00


 


Sodium Level


 137 mmol/L


(136-145) 138 mmol/L


(136-145) 


 





 


Potassium Level


 4.1 mmol/L


(3.5-5.1) 4.0 mmol/L


(3.5-5.1) 


 





 


Chloride Level


 103 mmol/L


() 104 mmol/L


() 


 





 


Carbon Dioxide Level


 28 mmol/L


(21-32) 28 mmol/L


(21-32) 


 





 


Anion Gap 6 (6-14)  6 (6-14)   


 


Blood Urea Nitrogen


 32 mg/dL


(7-20) 31 mg/dL


(7-20) 


 





 


Creatinine


 1.1 mg/dL


(0.6-1.0) 1.1 mg/dL


(0.6-1.0) 


 





 


Estimated GFR


(Cockcroft-Gault) 52.8 


 52.8 


 


 





 


Glucose Level


 181 mg/dL


(70-99) 177 mg/dL


(70-99) 


 





 


Calcium Level


 8.0 mg/dL


(8.5-10.1) 8.2 mg/dL


(8.5-10.1) 


 





 


Phosphorus Level


 2.9 mg/dL


(2.6-4.7) 2.9 mg/dL


(2.6-4.7) 


 





 


Magnesium Level


 2.1 mg/dL


(1.8-2.4) 2.3 mg/dL


(1.8-2.4) 


 





 


White Blood Count


 


 30.1 x10^3/uL


(4.0-11.0) 


 





 


Red Blood Count


 


 2.81 x10^6/uL


(3.50-5.40) 


 





 


Hemoglobin


 


 9.1 g/dL


(12.0-15.5) 


 





 


Hematocrit


 


 27.5 %


(36.0-47.0) 


 





 


Mean Corpuscular Volume  98 fL ()   


 


Mean Corpuscular Hemoglobin  32 pg (25-35)   


 


Mean Corpuscular Hemoglobin


Concent 


 33 g/dL


(31-37) 


 





 


Red Cell Distribution Width


 


 16.8 %


(11.5-14.5) 


 





 


Platelet Count


 


 281 x10^3/uL


(140-400) 


 





 


Neutrophils (%) (Auto)  81 % (31-73)   


 


Lymphocytes (%) (Auto)  10 % (24-48)   


 


Monocytes (%) (Auto)  6 % (0-9)   


 


Eosinophils (%) (Auto)  2 % (0-3)   


 


Basophils (%) (Auto)  1 % (0-3)   


 


Neutrophils # (Auto)


 


 24.4 x10^3/uL


(1.8-7.7) 


 





 


Lymphocytes # (Auto)


 


 3.0 x10^3/uL


(1.0-4.8) 


 





 


Monocytes # (Auto)


 


 1.7 x10^3/uL


(0.0-1.1) 


 





 


Eosinophils # (Auto)


 


 0.7 x10^3/uL


(0.0-0.7) 


 





 


Basophils # (Auto)


 


 0.2 x10^3/uL


(0.0-0.2) 


 





 


BUN/Creatinine Ratio  28 (6-20)   


 


Total Bilirubin


 


 1.0 mg/dL


(0.2-1.0) 


 





 


Aspartate Amino Transf


(AST/SGOT) 


 46 U/L (15-37) 


 


 





 


Alanine Aminotransferase


(ALT/SGPT) 


 20 U/L (14-59) 


 


 





 


Alkaline Phosphatase


 


 119 U/L


() 


 





 


Total Protein


 


 5.2 g/dL


(6.4-8.2) 


 





 


Albumin


 


 2.4 g/dL


(3.4-5.0) 


 





 


Albumin/Globulin Ratio  0.9 (1.0-1.7)   


 


Glucose (Fingerstick)


 


 


 182 mg/dL


(70-99) 





 


O2 Saturation    95 % (92-99) 


 


Arterial Blood pH


 


 


 


 7.34


(7.35-7.45)


 


Arterial Blood pCO2 at


Patient Temp 


 


 


 41 mmHg


(35-46)


 


Arterial Blood pO2 at Patient


Temp 


 


 


 83 mmHg


()


 


Arterial Blood HCO3


 


 


 


 22 mmol/L


(21-28)


 


Arterial Blood Base Excess


 


 


 


 -4 mmol/L


(-3-3)


 


FiO2    30% 


 


Test


 4/1/20


11:29 4/1/20


11:30 4/1/20


16:00 4/2/20


00:01


 


Glucose (Fingerstick)


 163 mg/dL


(70-99) 


 


 





 


Sodium Level


 


 137 mmol/L


(136-145) 137 mmol/L


(136-145) 138 mmol/L


(136-145)


 


Potassium Level


 


 3.7 mmol/L


(3.5-5.1) 3.9 mmol/L


(3.5-5.1) 3.8 mmol/L


(3.5-5.1)


 


Chloride Level


 


 104 mmol/L


() 104 mmol/L


() 105 mmol/L


()


 


Carbon Dioxide Level


 


 29 mmol/L


(21-32) 28 mmol/L


(21-32) 27 mmol/L


(21-32)


 


Anion Gap  4 (6-14)  5 (6-14)  6 (6-14) 


 


Blood Urea Nitrogen


 


 31 mg/dL


(7-20) 30 mg/dL


(7-20) 33 mg/dL


(7-20)


 


Creatinine


 


 1.1 mg/dL


(0.6-1.0) 1.1 mg/dL


(0.6-1.0) 1.2 mg/dL


(0.6-1.0)


 


Estimated GFR


(Cockcroft-Gault) 


 52.8 


 52.8 


 47.7 





 


Glucose Level


 


 165 mg/dL


(70-99) 170 mg/dL


(70-99) 173 mg/dL


(70-99)


 


Calcium Level


 


 7.2 mg/dL


(8.5-10.1) 8.2 mg/dL


(8.5-10.1) 8.0 mg/dL


(8.5-10.1)


 


Phosphorus Level


 


 2.9 mg/dL


(2.6-4.7) 2.9 mg/dL


(2.6-4.7) 3.2 mg/dL


(2.6-4.7)


 


Magnesium Level


 


 2.0 mg/dL


(1.8-2.4) 2.3 mg/dL


(1.8-2.4) 2.0 mg/dL


(1.8-2.4)


 


White Blood Count


 


 


 22.6 x10^3/uL


(4.0-11.0) 





 


Red Blood Count


 


 


 2.75 x10^6/uL


(3.50-5.40) 





 


Hemoglobin


 


 


 8.9 g/dL


(12.0-15.5) 





 


Hematocrit


 


 


 26.9 %


(36.0-47.0) 





 


Mean Corpuscular Volume   98 fL ()  


 


Mean Corpuscular Hemoglobin   32 pg (25-35)  


 


Mean Corpuscular Hemoglobin


Concent 


 


 33 g/dL


(31-37) 





 


Red Cell Distribution Width


 


 


 17.1 %


(11.5-14.5) 





 


Platelet Count


 


 


 257 x10^3/uL


(140-400) 





 


Neutrophils (%) (Auto)   82 % (31-73)  


 


Lymphocytes (%) (Auto)   10 % (24-48)  


 


Monocytes (%) (Auto)   6 % (0-9)  


 


Eosinophils (%) (Auto)   2 % (0-3)  


 


Basophils (%) (Auto)   1 % (0-3)  


 


Neutrophils # (Auto)


 


 


 18.5 x10^3/uL


(1.8-7.7) 





 


Lymphocytes # (Auto)


 


 


 2.2 x10^3/uL


(1.0-4.8) 





 


Monocytes # (Auto)


 


 


 1.3 x10^3/uL


(0.0-1.1) 





 


Eosinophils # (Auto)


 


 


 0.5 x10^3/uL


(0.0-0.7) 





 


Basophils # (Auto)


 


 


 0.1 x10^3/uL


(0.0-0.2) 





 


Test


 4/2/20


05:30 4/2/20


05:48 


 





 


Sodium Level


 138 mmol/L


(136-145) 


 


 





 


Potassium Level


 3.7 mmol/L


(3.5-5.1) 


 


 





 


Chloride Level


 105 mmol/L


() 


 


 





 


Carbon Dioxide Level


 25 mmol/L


(21-32) 


 


 





 


Anion Gap 8 (6-14)    


 


Blood Urea Nitrogen


 38 mg/dL


(7-20) 


 


 





 


Creatinine


 1.3 mg/dL


(0.6-1.0) 


 


 





 


Estimated GFR


(Cockcroft-Gault) 43.5 


 


 


 





 


Glucose Level


 180 mg/dL


(70-99) 


 


 





 


Calcium Level


 8.0 mg/dL


(8.5-10.1) 


 


 





 


Phosphorus Level


 3.1 mg/dL


(2.6-4.7) 


 


 





 


Magnesium Level


 2.0 mg/dL


(1.8-2.4) 


 


 





 


Glucose (Fingerstick)


 


 168 mg/dL


(70-99) 


 











Laboratory Tests








Test


 4/1/20


11:29 4/1/20


11:30 4/1/20


16:00 4/2/20


00:01


 


Glucose (Fingerstick)


 163 mg/dL


(70-99) 


 


 





 


Sodium Level


 


 137 mmol/L


(136-145) 137 mmol/L


(136-145) 138 mmol/L


(136-145)


 


Potassium Level


 


 3.7 mmol/L


(3.5-5.1) 3.9 mmol/L


(3.5-5.1) 3.8 mmol/L


(3.5-5.1)


 


Chloride Level


 


 104 mmol/L


() 104 mmol/L


() 105 mmol/L


()


 


Carbon Dioxide Level


 


 29 mmol/L


(21-32) 28 mmol/L


(21-32) 27 mmol/L


(21-32)


 


Anion Gap  4 (6-14)  5 (6-14)  6 (6-14) 


 


Blood Urea Nitrogen


 


 31 mg/dL


(7-20) 30 mg/dL


(7-20) 33 mg/dL


(7-20)


 


Creatinine


 


 1.1 mg/dL


(0.6-1.0) 1.1 mg/dL


(0.6-1.0) 1.2 mg/dL


(0.6-1.0)


 


Estimated GFR


(Cockcroft-Gault) 


 52.8 


 52.8 


 47.7 





 


Glucose Level


 


 165 mg/dL


(70-99) 170 mg/dL


(70-99) 173 mg/dL


(70-99)


 


Calcium Level


 


 7.2 mg/dL


(8.5-10.1) 8.2 mg/dL


(8.5-10.1) 8.0 mg/dL


(8.5-10.1)


 


Phosphorus Level


 


 2.9 mg/dL


(2.6-4.7) 2.9 mg/dL


(2.6-4.7) 3.2 mg/dL


(2.6-4.7)


 


Magnesium Level


 


 2.0 mg/dL


(1.8-2.4) 2.3 mg/dL


(1.8-2.4) 2.0 mg/dL


(1.8-2.4)


 


White Blood Count


 


 


 22.6 x10^3/uL


(4.0-11.0) 





 


Red Blood Count


 


 


 2.75 x10^6/uL


(3.50-5.40) 





 


Hemoglobin


 


 


 8.9 g/dL


(12.0-15.5) 





 


Hematocrit


 


 


 26.9 %


(36.0-47.0) 





 


Mean Corpuscular Volume   98 fL ()  


 


Mean Corpuscular Hemoglobin   32 pg (25-35)  


 


Mean Corpuscular Hemoglobin


Concent 


 


 33 g/dL


(31-37) 





 


Red Cell Distribution Width


 


 


 17.1 %


(11.5-14.5) 





 


Platelet Count


 


 


 257 x10^3/uL


(140-400) 





 


Neutrophils (%) (Auto)   82 % (31-73)  


 


Lymphocytes (%) (Auto)   10 % (24-48)  


 


Monocytes (%) (Auto)   6 % (0-9)  


 


Eosinophils (%) (Auto)   2 % (0-3)  


 


Basophils (%) (Auto)   1 % (0-3)  


 


Neutrophils # (Auto)


 


 


 18.5 x10^3/uL


(1.8-7.7) 





 


Lymphocytes # (Auto)


 


 


 2.2 x10^3/uL


(1.0-4.8) 





 


Monocytes # (Auto)


 


 


 1.3 x10^3/uL


(0.0-1.1) 





 


Eosinophils # (Auto)


 


 


 0.5 x10^3/uL


(0.0-0.7) 





 


Basophils # (Auto)


 


 


 0.1 x10^3/uL


(0.0-0.2) 





 


Test


 4/2/20


05:30 4/2/20


05:48 


 





 


Sodium Level


 138 mmol/L


(136-145) 


 


 





 


Potassium Level


 3.7 mmol/L


(3.5-5.1) 


 


 





 


Chloride Level


 105 mmol/L


() 


 


 





 


Carbon Dioxide Level


 25 mmol/L


(21-32) 


 


 





 


Anion Gap 8 (6-14)    


 


Blood Urea Nitrogen


 38 mg/dL


(7-20) 


 


 





 


Creatinine


 1.3 mg/dL


(0.6-1.0) 


 


 





 


Estimated GFR


(Cockcroft-Gault) 43.5 


 


 


 





 


Glucose Level


 180 mg/dL


(70-99) 


 


 





 


Calcium Level


 8.0 mg/dL


(8.5-10.1) 


 


 





 


Phosphorus Level


 3.1 mg/dL


(2.6-4.7) 


 


 





 


Magnesium Level


 2.0 mg/dL


(1.8-2.4) 


 


 





 


Glucose (Fingerstick)


 


 168 mg/dL


(70-99) 


 











Medications





Active Scripts








 Medications  Dose


 Route/Sig


 Max Daily Dose Days Date Category


 


 Bisoprolol


 Fumarate 5 Mg


 Tablet  10 Mg


 PO DAILY


   3/16/20 Reported








Comments


Chest x-ray reviewed 4/2, poor ins film, basal effusions and volume loss


CT chest 3/30


reviewed





Impression


.


IMPRESSION:


1.  Acute hypoxemic respiratory failure secondary to ARDS due to acute 

pancreatitis, septic shock, abdominal distention, and pneumonia.and pleural 

effusions.  Pleural effusions secondary to abdominal process and/or general 

volume overload from renal failure.


2.  Gallstone pancreatitis. WITH NECROSIS


3.  Severe metabolic acidosis.stable


4.  Acute kidney injury. ON CRRT


5.  Acute gallstone pancreatitis.


6.  Hypoalbuminemia.


7.  Hypocalcemia.


8.  Leukocytosis


9.  Chronic anemia


10. Covid 19 testing negative


11. Acute /chronic anemia suspected hemorrhagic fluid in pelvis





Plan


.


AC mode, no change in current FIO2/ PEEP


wean versed slowly, add Precedex if needed


Not ready for trial, still fevers


Cont AC mode , 40 FIO2/  PEEP.  


Transfuse prn, check H/H today


Not a surgical candidate per surgery


supportive care


CRRT


Antibiotics per ID, may need lines changed


Follow nephrology input


Nutritional support with TPN


Prognosis is guarded


d/w RN/RT


no intervention for effusions, increase UF with HD . d/w DR Gordillo


cct 30 min











SHARYN SOLORZANO MD                  Apr 2, 2020 08:14

## 2020-04-02 NOTE — PDOC
Renal-Progress Notes


Subjective Notes


Notes


REMAINS ON THE VENT





History of Present Illness


Hx of present illness


NO CHANGE





Vitals


Vitals





Vital Signs








  Date Time  Temp Pulse Resp B/P (MAP) Pulse Ox O2 Delivery O2 Flow Rate FiO2


 


4/2/20 11:35      Mechanical Ventilator  


 


4/2/20 11:00  92 25 115/66 (82) 100   


 


4/2/20 08:00 100.2       





 100.2       


 


4/2/20 05:28       6.0 








Weight


Weight [ ]





I.O.


Intake and Output











Intake and Output 


 


 4/2/20





 07:00


 


Intake Total 2370 ml


 


Output Total 855 ml


 


Balance 1515 ml


 


 


 


IV Total 2370 ml


 


Output Urine Total 555 ml


 


Stool Total 150 ml


 


Gastric Drainage Total 150 ml











Labs


Labs





Laboratory Tests








Test


 4/1/20


16:00 4/2/20


00:01 4/2/20


05:30 4/2/20


05:48


 


White Blood Count


 22.6 x10^3/uL


(4.0-11.0) 


 


 





 


Red Blood Count


 2.75 x10^6/uL


(3.50-5.40) 


 


 





 


Hemoglobin


 8.9 g/dL


(12.0-15.5) 


 


 





 


Hematocrit


 26.9 %


(36.0-47.0) 


 


 





 


Mean Corpuscular Volume 98 fL ()    


 


Mean Corpuscular Hemoglobin 32 pg (25-35)    


 


Mean Corpuscular Hemoglobin


Concent 33 g/dL


(31-37) 


 


 





 


Red Cell Distribution Width


 17.1 %


(11.5-14.5) 


 


 





 


Platelet Count


 257 x10^3/uL


(140-400) 


 


 





 


Neutrophils (%) (Auto) 82 % (31-73)    


 


Lymphocytes (%) (Auto) 10 % (24-48)    


 


Monocytes (%) (Auto) 6 % (0-9)    


 


Eosinophils (%) (Auto) 2 % (0-3)    


 


Basophils (%) (Auto) 1 % (0-3)    


 


Neutrophils # (Auto)


 18.5 x10^3/uL


(1.8-7.7) 


 


 





 


Lymphocytes # (Auto)


 2.2 x10^3/uL


(1.0-4.8) 


 


 





 


Monocytes # (Auto)


 1.3 x10^3/uL


(0.0-1.1) 


 


 





 


Eosinophils # (Auto)


 0.5 x10^3/uL


(0.0-0.7) 


 


 





 


Basophils # (Auto)


 0.1 x10^3/uL


(0.0-0.2) 


 


 





 


Sodium Level


 137 mmol/L


(136-145) 138 mmol/L


(136-145) 138 mmol/L


(136-145) 





 


Potassium Level


 3.9 mmol/L


(3.5-5.1) 3.8 mmol/L


(3.5-5.1) 3.7 mmol/L


(3.5-5.1) 





 


Chloride Level


 104 mmol/L


() 105 mmol/L


() 105 mmol/L


() 





 


Carbon Dioxide Level


 28 mmol/L


(21-32) 27 mmol/L


(21-32) 25 mmol/L


(21-32) 





 


Anion Gap 5 (6-14)  6 (6-14)  8 (6-14)  


 


Blood Urea Nitrogen


 30 mg/dL


(7-20) 33 mg/dL


(7-20) 38 mg/dL


(7-20) 





 


Creatinine


 1.1 mg/dL


(0.6-1.0) 1.2 mg/dL


(0.6-1.0) 1.3 mg/dL


(0.6-1.0) 





 


Estimated GFR


(Cockcroft-Gault) 52.8 


 47.7 


 43.5 


 





 


Glucose Level


 170 mg/dL


(70-99) 173 mg/dL


(70-99) 180 mg/dL


(70-99) 





 


Calcium Level


 8.2 mg/dL


(8.5-10.1) 8.0 mg/dL


(8.5-10.1) 8.0 mg/dL


(8.5-10.1) 





 


Phosphorus Level


 2.9 mg/dL


(2.6-4.7) 3.2 mg/dL


(2.6-4.7) 3.1 mg/dL


(2.6-4.7) 





 


Magnesium Level


 2.3 mg/dL


(1.8-2.4) 2.0 mg/dL


(1.8-2.4) 2.0 mg/dL


(1.8-2.4) 





 


Glucose (Fingerstick)


 


 


 


 168 mg/dL


(70-99)











Micro


Micro





Microbiology


3/24/20 Blood Culture - Final, Complete


          NO GROWTH AFTER 5 DAYS





Review of Systems


Constitutional:  yes: other (ON THE VENT)





Physical Exam


General Appearance:  other (ON THE VENT)


Skin:  warm


Respiratory:  decreased breath sounds


Heart:  S1S2


Abdomen:  soft, bowel sounds present


Genitourinary:  bladder flat


Extremities:  pulses present, edema


Neurology:  other (SEDATED)





Assessment


Assessment


IMP





[id number]-CR OF 1.3 ON CRRT


ANEMIA


LEUCOCYTOSIS


ANASARCA DUE TO 3RD SPACING


HYPERKALEMIA-RESOLVED


ACIDOSIS AND ACIDEMIA-CONTROLLED


ACUTE REPS FAILURE


ACUTE PANCREATITIS


HYPOALBUMINEMIA


HYPOCALCEMIA-BETTER


POOR HD CATHETER FUNCTION





PLAN





TPN


PRBC AS NEEDED


CONT YOLI


STOP CRRT


NEW HD CATHETER


ATTEMPT TO DIURESE


IHD TOMORROW


VENT SUPPORT


PRESSORS AS NEEDED


ANTIBIOTICS


WILL FOLLOW


D/W DR SOLORZANO


VERY POOR PROGNOSIS











BETH HEIN MD                  Apr 2, 2020 11:37

## 2020-04-02 NOTE — RAD
CHEST AP ONLY

 

History: Dialysis catheter replacement. Respiratory failure.

 

Comparison: April 2, 2020

 

Findings:

Low lung volumes. Diffuse interstitial and alveolar opacities, unchanged. 

Bilateral layering pleural effusions, unchanged. Right IJ central line 

with tip projecting over the right atrium. Stable left-sided central line 

and right PICC. Stable endotracheal tube and enteric tube. Unchanged heart

size. No pneumothorax.

 

Impression: 

1.  Diffuse interstitial and alveolar opacities, unchanged.

2.  Bilateral layering pleural effusions, unchanged.

3.  Right IJ central line with tip projecting over the right atrium.

 

Electronically signed by: Torrey Coyle DO (4/2/2020 9:34 AM) [id number]

## 2020-04-02 NOTE — PDOC
PROGRESS NOTES


Chief Complaint


Chief Complaint


Respiratory failure requiring mechanical ventilation


Severe Acute gallstone pancreatitis (not a surgical candidate at this time) with

necrosis


Acute kidney failure now requiring dialysis


Salpingitis


Gallstones (Calculus of gallbladder with acute cholecystitis without 

obstruction)


HTN 


Leukocytosis 


Hypoxia


Uterine fibroid


Hypoxia with respiratory failure


Intractable pain


Intractable nausea


Covid 19 negative. 


Acute on chronic anemia will have to follow up since there is suspicion for 

hemorrhagic fluid in pelvis





Plan:


discussed with mother at bedside


continue supportive measures


grim prognosis


discussed with surgical consultant


planning of trach for monday if unable to extubate





History of Present Illness


History of Present Illness


4/2/2020


Continues to remain critically ill.  The patient unable to be taken off 

ventilatory support as of yet.,  Discussed with pulmonary consultant.  Planning 

all tracheostomy on Monday if he is unable to be weaned off vent





4/1/2020


No acute events reported overnight, no fever or chills, remains critically 

stable, discussed with mother at bedside. 





[id number]


Patient seen and examined in the ICU


She is critically ill


Mechanically ventilated


Assist-control/25/450/30 with 8 of PEEP


She is on cefepime daptomycin Flagyl and micafungin GEN for antibiotic coverage


Also getting TPN and CRRT


Sedated with Versed


Getting IV albumin also


She is tachycardic at 112 bpm


Temp max 100.0


White blood count is down from 45,000 35,000 today


Chart reviewed


Discussed with RN











[id number]


Patient seen and examined in the ICU


She remains very critically ill


Going for a CT of the abdomen chest and pelvis


Ventilated : On assist control 40% FiO2


Discussed with RN


Chart reviewed








[id number]


Patient seen and examined in the ICU


She is still on CRRT although we plan to change to hemodialysis soon


Chart reviewed


Discussed with RN


Hemoglobin down to 6.9 (suspect CRRT related , Will let nephrology consider 

transfusion while on dialysis)








[id number]


Patient seen and examined in the ICU


She is mechanically ventilated


Before meals/25/450/30%


Also on CRRT


Very critically ill


Chart reviewed


Discussed with RN











[id number]


Patient seen and examined in the ICU


She is now been transferred to the Covid 19 unit so we can rule out Covid and 

keep her in isolation


Very critically ill


Intubated and  ventilated


Assist control 40%


Chart reviewed


Discussed with RN


Still on CRRT


Getting a chest x-ray now


Very concerned about her prognosis








[id number]


Patient seen and examined in the ICU


She is extremely critically ill


Remains mechanically ventilated with assist control/25/450/40%


Also on continuous renal replacement therapy


Chart reviewed


Discussed with RN


She has TPN hanging


Also on pressors











[id number]


Patient seen and examined in the ICU


She is still requiring CRRT


On the vent with assist control but her FiO2 is down to 40% from yesterday


Slightly better but still very critically ill


Discussed with RN


Chart reviewed








[id number]


Patient seen and examined in the ICU


She remains extremely critically ill


On IV fentanyl IV Versed IV Doxy


On the vent


Assist-control/25/450/70%


She is critically ill


Discussed with RN


Chart reviewed


Patient is currently on CRRT as well








[id number]


Patient seen and examined in the ICU


She had to be intubated this morning


On assist-control 25/450/100% with 10 of PEEP and only satting 87%


She is extremely critically ill


I'm not sure if she will survive


Chart reviewed


Discussed with RN











Ms Tadeo is a 48yo F w/ PMHx HTN, prediabetes who presents the emergency room

complaints of abdominal pain. Patient described off and on 3 days. She states is

constant, described as a squeezing sensation in a band-like distribution. + 

nausea, vomiting.  She denies any fever or diarrhea.  Patient denies any 

abdominal surgical procedures.  She states is worse with movements, car ride.  

Pain initially was upper abdomen however now pretty much generalized.  Last 

bowel movement was 3/15/2020. Nothing makes her pain better.  Patient denies any

shortness of breath.  She does state the pain moves into her chest.  Denies any 

headache or visual changes.


Lipase 64361, , , Bilirubin 1.4.


CT abdomen confirms pancreatic inflammation, peripancreatic fluid and i

nflammatory changes around the pancreas consistent with pancreatitis. 

Cholelithiasis and 1.4cm uterine fibroid as well as possible left salpingitis. 

Admitted for further care


GI, General surgery, ID, Pulm consulted.





3/17: Overnight per report no urine output. Added dilaudid for pain, PICC placed

per IR. Renal US negative.Seen bedside in ICU, given 2L additional NSS and 

albumin infusion. Still hypotensive, started on levophed. Repeat CT abdomen with

necrosis. Updated her fiancee


3/18: Sats are only 87% on nasal cannula oxygen. Dialysis catheter per 

nephrology


3/19: She is now on BiPAP appears more ill, now on dialysis


3/20: Seen on BiPAP. Her mother and another family member are present and seemed

to be good support for her. Currently on dialysis. Appears critically ill


3/21: Overnight Tmax 101.7 , still on BiPAP FiO2 40%, still on low dose Levophed

gtt, TPN initiated. On dialysis





Overnight still febrile. Dialysis today. She wakes up and responds to pain. No 

CP.





Vitals


Vitals





Vital Signs








  Date Time  Temp Pulse Resp B/P (MAP) Pulse Ox O2 Delivery O2 Flow Rate FiO2


 


4/2/20 14:00  90 25 110/64 (79) 100 Ventilator  


 


4/2/20 12:00 99.6       





 99.6       


 


4/2/20 05:28       6.0 











Physical Exam


Physical Exam


GENERAL: Orally intubated/sedated - generalizd anasarca 


HEENT: Mild icterus, pupils equal, ETT, NGT


NECK:  Supple. 


LUNGS:  Decreased breath sounds at the bases.


HEART:  S1, S2, regular 


ABDOMEN:  Distended, hypoactive BS, Rectal tube in place 


: [place]   


EXTREMITIES: Generalized edema, no cyanosis - some mottling, [place] boots b

ilaterally 


DERMATOLOGIC:  Warm and dry.  No generalized rash.  


CENTRAL NERVOUS SYSTEM: Sedated 


RIJ Temp HDC, RUE-PICC & LIJ - clean


General:  Other (sedated )


Heart:  Other (increased rate)


Lungs:  Other (decrease bs)


Abdomen:  Other (firm)


Extremities:  No edema, Other (SOME CLUBBING )


Skin:  Other (mottling noted to extremities )





Labs


LABS





Laboratory Tests








Test


 4/1/20


16:00 4/2/20


00:01 4/2/20


05:30 4/2/20


05:48


 


White Blood Count


 22.6 x10^3/uL


(4.0-11.0) 


 


 





 


Red Blood Count


 2.75 x10^6/uL


(3.50-5.40) 


 


 





 


Hemoglobin


 8.9 g/dL


(12.0-15.5) 


 


 





 


Hematocrit


 26.9 %


(36.0-47.0) 


 


 





 


Mean Corpuscular Volume 98 fL ()    


 


Mean Corpuscular Hemoglobin 32 pg (25-35)    


 


Mean Corpuscular Hemoglobin


Concent 33 g/dL


(31-37) 


 


 





 


Red Cell Distribution Width


 17.1 %


(11.5-14.5) 


 


 





 


Platelet Count


 257 x10^3/uL


(140-400) 


 


 





 


Neutrophils (%) (Auto) 82 % (31-73)    


 


Lymphocytes (%) (Auto) 10 % (24-48)    


 


Monocytes (%) (Auto) 6 % (0-9)    


 


Eosinophils (%) (Auto) 2 % (0-3)    


 


Basophils (%) (Auto) 1 % (0-3)    


 


Neutrophils # (Auto)


 18.5 x10^3/uL


(1.8-7.7) 


 


 





 


Lymphocytes # (Auto)


 2.2 x10^3/uL


(1.0-4.8) 


 


 





 


Monocytes # (Auto)


 1.3 x10^3/uL


(0.0-1.1) 


 


 





 


Eosinophils # (Auto)


 0.5 x10^3/uL


(0.0-0.7) 


 


 





 


Basophils # (Auto)


 0.1 x10^3/uL


(0.0-0.2) 


 


 





 


Sodium Level


 137 mmol/L


(136-145) 138 mmol/L


(136-145) 138 mmol/L


(136-145) 





 


Potassium Level


 3.9 mmol/L


(3.5-5.1) 3.8 mmol/L


(3.5-5.1) 3.7 mmol/L


(3.5-5.1) 





 


Chloride Level


 104 mmol/L


() 105 mmol/L


() 105 mmol/L


() 





 


Carbon Dioxide Level


 28 mmol/L


(21-32) 27 mmol/L


(21-32) 25 mmol/L


(21-32) 





 


Anion Gap 5 (6-14)  6 (6-14)  8 (6-14)  


 


Blood Urea Nitrogen


 30 mg/dL


(7-20) 33 mg/dL


(7-20) 38 mg/dL


(7-20) 





 


Creatinine


 1.1 mg/dL


(0.6-1.0) 1.2 mg/dL


(0.6-1.0) 1.3 mg/dL


(0.6-1.0) 





 


Estimated GFR


(Cockcroft-Gault) 52.8 


 47.7 


 43.5 


 





 


Glucose Level


 170 mg/dL


(70-99) 173 mg/dL


(70-99) 180 mg/dL


(70-99) 





 


Calcium Level


 8.2 mg/dL


(8.5-10.1) 8.0 mg/dL


(8.5-10.1) 8.0 mg/dL


(8.5-10.1) 





 


Phosphorus Level


 2.9 mg/dL


(2.6-4.7) 3.2 mg/dL


(2.6-4.7) 3.1 mg/dL


(2.6-4.7) 





 


Magnesium Level


 2.3 mg/dL


(1.8-2.4) 2.0 mg/dL


(1.8-2.4) 2.0 mg/dL


(1.8-2.4) 





 


Glucose (Fingerstick)


 


 


 


 168 mg/dL


(70-99)


 


Test


 4/2/20


08:00 


 


 





 


O2 Saturation 91 % (92-99)    


 


Arterial Blood pH


 7.43


(7.35-7.45) 


 


 





 


Arterial Blood pCO2 at


Patient Temp 31 mmHg


(35-46) 


 


 





 


Arterial Blood pO2 at Patient


Temp 65 mmHg


() 


 


 





 


Arterial Blood HCO3


 20 mmol/L


(21-28) 


 


 





 


Arterial Blood Base Excess


 -3 mmol/L


(-3-3) 


 


 





 


FiO2 30    











Assessment and Plan


Assessmemt and Plan


Problems


Medical Problems:


(1) Acute pancreatitis


Status: Acute  





(2) Cholelithiasis


Status: Acute  











Comment


Review of Relevant


I have reviewed the following items josy (where applicable) has been applied.


Labs





Laboratory Tests








Test


 3/31/20


17:30 3/31/20


17:40 4/1/20


00:10 4/1/20


00:15


 


White Blood Count


 37.4 x10^3/uL


(4.0-11.0) 


 


 





 


Red Blood Count


 2.89 x10^6/uL


(3.50-5.40) 


 


 





 


Hemoglobin


 9.3 g/dL


(12.0-15.5) 


 


 





 


Hematocrit


 28.0 %


(36.0-47.0) 


 


 





 


Mean Corpuscular Volume 97 fL ()    


 


Mean Corpuscular Hemoglobin 32 pg (25-35)    


 


Mean Corpuscular Hemoglobin


Concent 33 g/dL


(31-37) 


 


 





 


Red Cell Distribution Width


 17.1 %


(11.5-14.5) 


 


 





 


Platelet Count


 307 x10^3/uL


(140-400) 


 


 





 


Neutrophils (%) (Auto) 86 % (31-73)    


 


Lymphocytes (%) (Auto) 6 % (24-48)    


 


Monocytes (%) (Auto) 5 % (0-9)    


 


Eosinophils (%) (Auto) 2 % (0-3)    


 


Basophils (%) (Auto) 1 % (0-3)    


 


Neutrophils # (Auto)


 32.1 x10^3/uL


(1.8-7.7) 


 


 





 


Lymphocytes # (Auto)


 2.4 x10^3/uL


(1.0-4.8) 


 


 





 


Monocytes # (Auto)


 1.9 x10^3/uL


(0.0-1.1) 


 


 





 


Eosinophils # (Auto)


 0.8 x10^3/uL


(0.0-0.7) 


 


 





 


Basophils # (Auto)


 0.2 x10^3/uL


(0.0-0.2) 


 


 





 


Sodium Level


 


 138 mmol/L


(136-145) 


 137 mmol/L


(136-145)


 


Potassium Level


 


 4.1 mmol/L


(3.5-5.1) 


 4.1 mmol/L


(3.5-5.1)


 


Chloride Level


 


 104 mmol/L


() 


 103 mmol/L


()


 


Carbon Dioxide Level


 


 28 mmol/L


(21-32) 


 28 mmol/L


(21-32)


 


Anion Gap  6 (6-14)   6 (6-14) 


 


Blood Urea Nitrogen


 


 33 mg/dL


(7-20) 


 32 mg/dL


(7-20)


 


Creatinine


 


 1.2 mg/dL


(0.6-1.0) 


 1.1 mg/dL


(0.6-1.0)


 


Estimated GFR


(Cockcroft-Gault) 


 47.7 


 


 52.8 





 


BUN/Creatinine Ratio  28 (6-20)   


 


Glucose Level


 


 185 mg/dL


(70-99) 


 181 mg/dL


(70-99)


 


Calcium Level


 


 8.1 mg/dL


(8.5-10.1) 


 8.0 mg/dL


(8.5-10.1)


 


Phosphorus Level


 


 3.1 mg/dL


(2.6-4.7) 


 2.9 mg/dL


(2.6-4.7)


 


Magnesium Level


 


 2.3 mg/dL


(1.8-2.4) 


 2.1 mg/dL


(1.8-2.4)


 


Total Bilirubin


 


 1.0 mg/dL


(0.2-1.0) 


 





 


Aspartate Amino Transf


(AST/SGOT) 


 50 U/L (15-37) 


 


 





 


Alanine Aminotransferase


(ALT/SGPT) 


 21 U/L (14-59) 


 


 





 


Alkaline Phosphatase


 


 128 U/L


() 


 





 


Total Protein


 


 5.3 g/dL


(6.4-8.2) 


 





 


Albumin


 


 2.5 g/dL


(3.4-5.0) 


 





 


Albumin/Globulin Ratio  0.9 (1.0-1.7)   


 


Glucose (Fingerstick)


 


 


 170 mg/dL


(70-99) 





 


Test


 4/1/20


05:30 4/1/20


05:37 4/1/20


08:00 4/1/20


11:29


 


White Blood Count


 30.1 x10^3/uL


(4.0-11.0) 


 


 





 


Red Blood Count


 2.81 x10^6/uL


(3.50-5.40) 


 


 





 


Hemoglobin


 9.1 g/dL


(12.0-15.5) 


 


 





 


Hematocrit


 27.5 %


(36.0-47.0) 


 


 





 


Mean Corpuscular Volume 98 fL ()    


 


Mean Corpuscular Hemoglobin 32 pg (25-35)    


 


Mean Corpuscular Hemoglobin


Concent 33 g/dL


(31-37) 


 


 





 


Red Cell Distribution Width


 16.8 %


(11.5-14.5) 


 


 





 


Platelet Count


 281 x10^3/uL


(140-400) 


 


 





 


Neutrophils (%) (Auto) 81 % (31-73)    


 


Lymphocytes (%) (Auto) 10 % (24-48)    


 


Monocytes (%) (Auto) 6 % (0-9)    


 


Eosinophils (%) (Auto) 2 % (0-3)    


 


Basophils (%) (Auto) 1 % (0-3)    


 


Neutrophils # (Auto)


 24.4 x10^3/uL


(1.8-7.7) 


 


 





 


Lymphocytes # (Auto)


 3.0 x10^3/uL


(1.0-4.8) 


 


 





 


Monocytes # (Auto)


 1.7 x10^3/uL


(0.0-1.1) 


 


 





 


Eosinophils # (Auto)


 0.7 x10^3/uL


(0.0-0.7) 


 


 





 


Basophils # (Auto)


 0.2 x10^3/uL


(0.0-0.2) 


 


 





 


Sodium Level


 138 mmol/L


(136-145) 


 


 





 


Potassium Level


 4.0 mmol/L


(3.5-5.1) 


 


 





 


Chloride Level


 104 mmol/L


() 


 


 





 


Carbon Dioxide Level


 28 mmol/L


(21-32) 


 


 





 


Anion Gap 6 (6-14)    


 


Blood Urea Nitrogen


 31 mg/dL


(7-20) 


 


 





 


Creatinine


 1.1 mg/dL


(0.6-1.0) 


 


 





 


Estimated GFR


(Cockcroft-Gault) 52.8 


 


 


 





 


BUN/Creatinine Ratio 28 (6-20)    


 


Glucose Level


 177 mg/dL


(70-99) 


 


 





 


Calcium Level


 8.2 mg/dL


(8.5-10.1) 


 


 





 


Phosphorus Level


 2.9 mg/dL


(2.6-4.7) 


 


 





 


Magnesium Level


 2.3 mg/dL


(1.8-2.4) 


 


 





 


Total Bilirubin


 1.0 mg/dL


(0.2-1.0) 


 


 





 


Aspartate Amino Transf


(AST/SGOT) 46 U/L (15-37) 


 


 


 





 


Alanine Aminotransferase


(ALT/SGPT) 20 U/L (14-59) 


 


 


 





 


Alkaline Phosphatase


 119 U/L


() 


 


 





 


Total Protein


 5.2 g/dL


(6.4-8.2) 


 


 





 


Albumin


 2.4 g/dL


(3.4-5.0) 


 


 





 


Albumin/Globulin Ratio 0.9 (1.0-1.7)    


 


Glucose (Fingerstick)


 


 182 mg/dL


(70-99) 


 163 mg/dL


(70-99)


 


O2 Saturation   95 % (92-99)  


 


Arterial Blood pH


 


 


 7.34


(7.35-7.45) 





 


Arterial Blood pCO2 at


Patient Temp 


 


 41 mmHg


(35-46) 





 


Arterial Blood pO2 at Patient


Temp 


 


 83 mmHg


() 





 


Arterial Blood HCO3


 


 


 22 mmol/L


(21-28) 





 


Arterial Blood Base Excess


 


 


 -4 mmol/L


(-3-3) 





 


FiO2   30%  


 


Test


 4/1/20


11:30 4/1/20


16:00 4/2/20


00:01 4/2/20


05:30


 


Sodium Level


 137 mmol/L


(136-145) 137 mmol/L


(136-145) 138 mmol/L


(136-145) 138 mmol/L


(136-145)


 


Potassium Level


 3.7 mmol/L


(3.5-5.1) 3.9 mmol/L


(3.5-5.1) 3.8 mmol/L


(3.5-5.1) 3.7 mmol/L


(3.5-5.1)


 


Chloride Level


 104 mmol/L


() 104 mmol/L


() 105 mmol/L


() 105 mmol/L


()


 


Carbon Dioxide Level


 29 mmol/L


(21-32) 28 mmol/L


(21-32) 27 mmol/L


(21-32) 25 mmol/L


(21-32)


 


Anion Gap 4 (6-14)  5 (6-14)  6 (6-14)  8 (6-14) 


 


Blood Urea Nitrogen


 31 mg/dL


(7-20) 30 mg/dL


(7-20) 33 mg/dL


(7-20) 38 mg/dL


(7-20)


 


Creatinine


 1.1 mg/dL


(0.6-1.0) 1.1 mg/dL


(0.6-1.0) 1.2 mg/dL


(0.6-1.0) 1.3 mg/dL


(0.6-1.0)


 


Estimated GFR


(Cockcroft-Gault) 52.8 


 52.8 


 47.7 


 43.5 





 


Glucose Level


 165 mg/dL


(70-99) 170 mg/dL


(70-99) 173 mg/dL


(70-99) 180 mg/dL


(70-99)


 


Calcium Level


 7.2 mg/dL


(8.5-10.1) 8.2 mg/dL


(8.5-10.1) 8.0 mg/dL


(8.5-10.1) 8.0 mg/dL


(8.5-10.1)


 


Phosphorus Level


 2.9 mg/dL


(2.6-4.7) 2.9 mg/dL


(2.6-4.7) 3.2 mg/dL


(2.6-4.7) 3.1 mg/dL


(2.6-4.7)


 


Magnesium Level


 2.0 mg/dL


(1.8-2.4) 2.3 mg/dL


(1.8-2.4) 2.0 mg/dL


(1.8-2.4) 2.0 mg/dL


(1.8-2.4)


 


White Blood Count


 


 22.6 x10^3/uL


(4.0-11.0) 


 





 


Red Blood Count


 


 2.75 x10^6/uL


(3.50-5.40) 


 





 


Hemoglobin


 


 8.9 g/dL


(12.0-15.5) 


 





 


Hematocrit


 


 26.9 %


(36.0-47.0) 


 





 


Mean Corpuscular Volume  98 fL ()   


 


Mean Corpuscular Hemoglobin  32 pg (25-35)   


 


Mean Corpuscular Hemoglobin


Concent 


 33 g/dL


(31-37) 


 





 


Red Cell Distribution Width


 


 17.1 %


(11.5-14.5) 


 





 


Platelet Count


 


 257 x10^3/uL


(140-400) 


 





 


Neutrophils (%) (Auto)  82 % (31-73)   


 


Lymphocytes (%) (Auto)  10 % (24-48)   


 


Monocytes (%) (Auto)  6 % (0-9)   


 


Eosinophils (%) (Auto)  2 % (0-3)   


 


Basophils (%) (Auto)  1 % (0-3)   


 


Neutrophils # (Auto)


 


 18.5 x10^3/uL


(1.8-7.7) 


 





 


Lymphocytes # (Auto)


 


 2.2 x10^3/uL


(1.0-4.8) 


 





 


Monocytes # (Auto)


 


 1.3 x10^3/uL


(0.0-1.1) 


 





 


Eosinophils # (Auto)


 


 0.5 x10^3/uL


(0.0-0.7) 


 





 


Basophils # (Auto)


 


 0.1 x10^3/uL


(0.0-0.2) 


 





 


Test


 4/2/20


05:48 4/2/20


08:00 


 





 


Glucose (Fingerstick)


 168 mg/dL


(70-99) 


 


 





 


O2 Saturation  91 % (92-99)   


 


Arterial Blood pH


 


 7.43


(7.35-7.45) 


 





 


Arterial Blood pCO2 at


Patient Temp 


 31 mmHg


(35-46) 


 





 


Arterial Blood pO2 at Patient


Temp 


 65 mmHg


() 


 





 


Arterial Blood HCO3


 


 20 mmol/L


(21-28) 


 





 


Arterial Blood Base Excess


 


 -3 mmol/L


(-3-3) 


 





 


FiO2  30   








Laboratory Tests








Test


 4/1/20


16:00 4/2/20


00:01 4/2/20


05:30 4/2/20


05:48


 


White Blood Count


 22.6 x10^3/uL


(4.0-11.0) 


 


 





 


Red Blood Count


 2.75 x10^6/uL


(3.50-5.40) 


 


 





 


Hemoglobin


 8.9 g/dL


(12.0-15.5) 


 


 





 


Hematocrit


 26.9 %


(36.0-47.0) 


 


 





 


Mean Corpuscular Volume 98 fL ()    


 


Mean Corpuscular Hemoglobin 32 pg (25-35)    


 


Mean Corpuscular Hemoglobin


Concent 33 g/dL


(31-37) 


 


 





 


Red Cell Distribution Width


 17.1 %


(11.5-14.5) 


 


 





 


Platelet Count


 257 x10^3/uL


(140-400) 


 


 





 


Neutrophils (%) (Auto) 82 % (31-73)    


 


Lymphocytes (%) (Auto) 10 % (24-48)    


 


Monocytes (%) (Auto) 6 % (0-9)    


 


Eosinophils (%) (Auto) 2 % (0-3)    


 


Basophils (%) (Auto) 1 % (0-3)    


 


Neutrophils # (Auto)


 18.5 x10^3/uL


(1.8-7.7) 


 


 





 


Lymphocytes # (Auto)


 2.2 x10^3/uL


(1.0-4.8) 


 


 





 


Monocytes # (Auto)


 1.3 x10^3/uL


(0.0-1.1) 


 


 





 


Eosinophils # (Auto)


 0.5 x10^3/uL


(0.0-0.7) 


 


 





 


Basophils # (Auto)


 0.1 x10^3/uL


(0.0-0.2) 


 


 





 


Sodium Level


 137 mmol/L


(136-145) 138 mmol/L


(136-145) 138 mmol/L


(136-145) 





 


Potassium Level


 3.9 mmol/L


(3.5-5.1) 3.8 mmol/L


(3.5-5.1) 3.7 mmol/L


(3.5-5.1) 





 


Chloride Level


 104 mmol/L


() 105 mmol/L


() 105 mmol/L


() 





 


Carbon Dioxide Level


 28 mmol/L


(21-32) 27 mmol/L


(21-32) 25 mmol/L


(21-32) 





 


Anion Gap 5 (6-14)  6 (6-14)  8 (6-14)  


 


Blood Urea Nitrogen


 30 mg/dL


(7-20) 33 mg/dL


(7-20) 38 mg/dL


(7-20) 





 


Creatinine


 1.1 mg/dL


(0.6-1.0) 1.2 mg/dL


(0.6-1.0) 1.3 mg/dL


(0.6-1.0) 





 


Estimated GFR


(Cockcroft-Gault) 52.8 


 47.7 


 43.5 


 





 


Glucose Level


 170 mg/dL


(70-99) 173 mg/dL


(70-99) 180 mg/dL


(70-99) 





 


Calcium Level


 8.2 mg/dL


(8.5-10.1) 8.0 mg/dL


(8.5-10.1) 8.0 mg/dL


(8.5-10.1) 





 


Phosphorus Level


 2.9 mg/dL


(2.6-4.7) 3.2 mg/dL


(2.6-4.7) 3.1 mg/dL


(2.6-4.7) 





 


Magnesium Level


 2.3 mg/dL


(1.8-2.4) 2.0 mg/dL


(1.8-2.4) 2.0 mg/dL


(1.8-2.4) 





 


Glucose (Fingerstick)


 


 


 


 168 mg/dL


(70-99)


 


Test


 4/2/20


08:00 


 


 





 


O2 Saturation 91 % (92-99)    


 


Arterial Blood pH


 7.43


(7.35-7.45) 


 


 





 


Arterial Blood pCO2 at


Patient Temp 31 mmHg


(35-46) 


 


 





 


Arterial Blood pO2 at Patient


Temp 65 mmHg


() 


 


 





 


Arterial Blood HCO3


 20 mmol/L


(21-28) 


 


 





 


Arterial Blood Base Excess


 -3 mmol/L


(-3-3) 


 


 





 


FiO2 30    








Microbiology


3/24/20 Blood Culture - Final, Complete


          NO GROWTH AFTER 5 DAYS


Medications





Current Medications


Sodium Chloride 1,000 ml @  1,000 mls/hr Q1H IV  Last administered on 3/16/20at 

03:00;  Start 3/16/20 at 03:00;  Stop 3/16/20 at 03:59;  Status DC


Ondansetron HCl (Zofran) 4 mg 1X  ONCE IVP  Last administered on 3/16/20at 

03:27;  Start 3/16/20 at 03:00;  Stop 3/16/20 at 03:01;  Status DC


Morphine Sulfate (Morphine Sulfate) 4 mg 1X  ONCE IV ;  Start 3/16/20 at 03:00; 

Stop 3/16/20 at 03:01;  Status Cancel


Ketorolac Tromethamine (Toradol 30mg Vial) 30 mg 1X  ONCE IV  Last administered 

on 3/16/20at 02:54;  Start 3/16/20 at 03:00;  Stop 3/16/20 at 03:01;  Status DC


Fentanyl Citrate (Fentanyl 2ml Vial) 25 mcg 1X  ONCE IVP  Last administered on 

3/16/20at 03:23;  Start 3/16/20 at 03:30;  Stop 3/16/20 at 03:31;  Status DC


Fentanyl Citrate (Fentanyl 2ml Vial) 100 mcg STK-MED ONCE .ROUTE ;  Start 3

/16/20 at 03:18;  Stop 3/16/20 at 03:18;  Status DC


Iohexol (Omnipaque 350 Mg/ml) 90 ml 1X  ONCE IV  Last administered on 3/16/20at 

03:25;  Start 3/16/20 at 03:30;  Stop 3/16/20 at 03:31;  Status DC


Info (CONTRAST GIVEN -- Rx MONITORING) 1 each PRN DAILY  PRN MC SEE COMMENTS;  

Start 3/16/20 at 03:30;  Stop 3/18/20 at 03:29;  Status DC


Hydromorphone HCl (Dilaudid) 0.5 mg 1X  ONCE IV  Last administered on 3/16/20at 

03:55;  Start 3/16/20 at 04:30;  Stop 3/16/20 at 04:32;  Status DC


Ondansetron HCl (Zofran) 4 mg PRN Q8HRS  PRN IV NAUSEA/VOMITING 1ST CHOICE;  

Start 3/16/20 at 05:00;  Stop 3/16/20 at 09:27;  Status DC


Morphine Sulfate (Morphine Sulfate) 2 mg PRN Q2HR  PRN IV SEVERE PAIN 7-10 Last 

administered on 3/17/20at 12:26;  Start 3/16/20 at 05:00;  Stop 3/17/20 at 

14:15;  Status DC


Sodium Chloride 1,000 ml @  125 mls/hr Q8H IV  Last administered on 3/16/20at 

20:56;  Start 3/16/20 at 05:00;  Stop 3/17/20 at 04:59;  Status DC


Hydromorphone HCl (Dilaudid) 0.5 mg PRN Q3HRS  PRN IV SEVERE PAIN 7-10 Last 

administered on 3/17/20at 10:06;  Start 3/16/20 at 05:00;  Stop 3/17/20 at 

12:01;  Status DC


Piperacillin Sod/ Tazobactam Sod 4.5 gm/Sodium Chloride 100 ml @  200 mls/hr 1X 

ONCE IV  Last administered on 3/16/20at 05:44;  Start 3/16/20 at 06:00;  Stop 

3/16/20 at 06:29;  Status DC


Ondansetron HCl (Zofran) 4 mg PRN Q4HRS  PRN IV NAUSEA/VOMITING 1ST CHOICE Last 

administered on 3/21/20at 16:15;  Start 3/16/20 at 09:30


Insulin Human Lispro (HumaLOG) 0-9 UNITS Q6HRS SQ  Last administered on 4/2/20at

00:04;  Start 3/16/20 at 09:30


Dextrose (Dextrose 50%-Water Syringe) 12.5 gm PRN Q15MIN  PRN IV SEE COMMENTS;  

Start 3/16/20 at 09:30


Pantoprazole Sodium (PROTONIX VIAL for IV PUSH) 40 mg DAILYAC IVP  Last 

administered on 4/2/20at 08:29;  Start 3/16/20 at 11:30


Prochlorperazine Edisylate (Compazine) 10 mg PRN Q6HRS  PRN IV NAUSEA/VOMITING, 

2nd CHOICE Last administered on 3/17/20at 00:42;  Start 3/16/20 at 17:45


Atenolol (Tenormin) 100 mg DAILY PO ;  Start 3/17/20 at 09:00;  Stop 3/16/20 at 

20:08;  Status DC


Metoprolol Tartrate (Lopressor Vial) 2.5 mg Q6HRS IVP  Last administered on 

3/17/20at 05:51;  Start 3/16/20 at 20:15;  Stop 3/17/20 at 10:02;  Status DC


Metoprolol Tartrate (Lopressor Vial) 5 mg Q6HRS IVP  Last administered on 

3/26/20at 00:12;  Start 3/17/20 at 10:15;  Stop 3/28/20 at 08:48;  Status DC


Hydromorphone HCl (Dilaudid) 1 mg PRN Q3HRS  PRN IV SEVERE PAIN 7-10 Last 

administered on 3/23/20at 05:13;  Start 3/17/20 at 12:00;  Stop 3/31/20 at 

00:25;  Status DC


Lidocaine HCl (Buffered Lidocaine 1%) 3 ml STK-MED ONCE .ROUTE ;  Start 3/17/20 

at 12:55;  Stop 3/17/20 at 12:56;  Status DC


Albumin Human 500 ml @  125 mls/hr 1X  ONCE IV  Last administered on 3/17/20at 

14:33;  Start 3/17/20 at 14:30;  Stop 3/17/20 at 18:32;  Status DC


Norepinephrine Bitartrate 8 mg/ Dextrose 258 ml @  17.299 mls/ hr CONT  PRN IV 

PER PROTOCOL Last administered on 4/1/20at 23:50;  Start 3/17/20 at 15:30


Sodium Chloride 1,000 ml @  125 mls/hr Q8H IV  Last administered on 3/17/20at 

21:04;  Start 3/17/20 at 16:00;  Stop 3/18/20 at 02:42;  Status DC


Albumin Human 500 ml @  125 mls/hr PRN BID  PRN IV After every 2L NSS & BP < 

90mm Last administered on 4/1/20at 14:21;  Start 3/17/20 at 16:00


Iohexol (Omnipaque 300 Mg/ml) 60 ml 1X  ONCE IV  Last administered on 3/17/20at 

17:20;  Start 3/17/20 at 17:00;  Stop 3/17/20 at 17:01;  Status DC


Info (CONTRAST GIVEN -- Rx MONITORING) 1 each PRN DAILY  PRN MC SEE COMMENTS;  

Start 3/17/20 at 17:00;  Stop 3/19/20 at 16:59;  Status DC


Meropenem 1 gm/ Sodium Chloride 100 ml @  200 mls/hr Q8HRS IV  Last administered

on 3/18/20at 05:45;  Start 3/17/20 at 20:00;  Stop 3/18/20 at 08:48;  Status DC


Furosemide (Lasix) 40 mg 1X  ONCE IVP  Last administered on 3/17/20at 22:12;  

Start 3/17/20 at 22:30;  Stop 3/17/20 at 22:31;  Status DC


Calcium Chloride 1000 mg/Sodium Chloride 110 ml @  220 mls/hr 1X  ONCE IV  Last 

administered on 3/17/20at 22:11;  Start 3/17/20 at 22:30;  Stop 3/17/20 at 

22:59;  Status DC


Albuterol Sulfate (Ventolin Neb Soln) 2.5 mg 1X  ONCE NEB  Last administered on 

3/18/20at 00:56;  Start 3/17/20 at 22:30;  Stop 3/17/20 at 22:31;  Status DC


Insulin Human Regular (HumuLIN R VIAL) 5 unit 1X  ONCE IV  Last administered on 

3/17/20at 22:14;  Start 3/17/20 at 22:30;  Stop 3/17/20 at 22:31;  Status DC


Magnesium Sulfate 50 ml @ 25 mls/hr 1X  ONCE IV  Last administered on 3/18/20at 

02:57;  Start 3/18/20 at 03:00;  Stop 3/18/20 at 04:59;  Status DC


Calcium Gluconate 1000 mg/Sodium Chloride 110 ml @  220 mls/hr 1X  ONCE IV  Last

administered on 3/18/20at 02:46;  Start 3/18/20 at 03:00;  Stop 3/18/20 at 

03:29;  Status DC


Sodium Chloride 1,000 ml @  200 mls/hr Q5H IV  Last administered on 3/18/20at 

02:46;  Start 3/18/20 at 03:00;  Stop 3/18/20 at 10:21;  Status DC


Calcium Gluconate 1000 mg/Sodium Chloride 110 ml @  220 mls/hr 1X  ONCE IV  Last

administered on 3/18/20at 03:21;  Start 3/18/20 at 03:30;  Stop 3/18/20 at 

03:59;  Status DC


Sodium Bicarbonate 50 meq/Sodium Chloride 1,050 ml @  75 mls/hr Q14H IV  Last 

administered on 3/22/20at 21:10;  Start 3/18/20 at 07:30;  Stop 3/23/20 at 

10:28;  Status DC


Calcium Gluconate 2000 mg/Sodium Chloride 120 ml @  220 mls/hr 1X  ONCE IV  Last

administered on 3/18/20at 09:05;  Start 3/18/20 at 07:30;  Stop 3/18/20 at 08

:02;  Status DC


Lidocaine HCl (Xylocaine-Mpf 1% 2ml Vial) 2 ml STK-MED ONCE .ROUTE ;  Start 

3/18/20 at 08:47;  Stop 3/18/20 at 08:47;  Status DC


Meropenem 500 mg/ Sodium Chloride 50 ml @  100 mls/hr Q12HR IV  Last 

administered on 3/23/20at 21:01;  Start 3/18/20 at 18:00;  Stop 3/24/20 at 

07:58;  Status DC


Lidocaine HCl (Buffered Lidocaine 1%) 3 ml STK-MED ONCE .ROUTE ;  Start 3/18/20 

at 09:46;  Stop 3/18/20 at 09:46;  Status DC


Lidocaine HCl (Buffered Lidocaine 1%) 6 ml 1X  ONCE INJ  Last administered on 3/

18/20at 10:26;  Start 3/18/20 at 10:15;  Stop 3/18/20 at 10:16;  Status DC


Info (Tpn Per Pharmacy) 1 each PRN DAILY  PRN MC SEE COMMENTS Last administered 

on 4/2/20at 13:43;  Start 3/18/20 at 12:00


Sodium Chloride 1,000 ml @  1,000 mls/hr Q1H PRN IV hypotension;  Start 3/18/20 

at 12:07;  Stop 3/18/20 at 18:06;  Status DC


Diphenhydramine HCl (Benadryl) 25 mg 1X PRN  PRN IV ITCHING;  Start 3/18/20 at 

12:15;  Stop 3/19/20 at 12:14;  Status DC


Diphenhydramine HCl (Benadryl) 25 mg 1X PRN  PRN IV ITCHING;  Start 3/18/20 at 

12:15;  Stop 3/19/20 at 12:14;  Status DC


Sodium Chloride 1,000 ml @  400 mls/hr Q2H30M PRN IV PATENCY;  Start 3/18/20 at 

12:07;  Stop 3/19/20 at 00:06;  Status DC


Info (PHARMACY MONITORING -- do not chart) 1 each PRN DAILY  PRN MC SEE 

COMMENTS;  Start 3/18/20 at 12:15;  Stop 3/20/20 at 08:13;  Status DC


Sodium Chloride 90 meq/Calcium Gluconate 10 meq/ Multivitamins 10 ml/Chromium/ 

Copper/Manganese/ Seleni/Zn 1 ml/ Total Parenteral Nutrition/Amino 

Acids/Dextrose/ Fat Emulsion Intravenous 55.005 ml  @ 2.292 mls/hr TPN  CONT IV 

;  Start 3/18/20 at 22:00;  Stop 3/18/20 at 12:33;  Status DC


Info (Tpn Per Pharmacy) 1 each PRN DAILY  PRN MC SEE COMMENTS;  Start 3/18/20 at

12:30;  Status UNV


Sodium Chloride 90 meq/Calcium Gluconate 10 meq/ Multivitamins 10 ml/Chromium/ 

Copper/Manganese/ Seleni/Zn 0.5 ml/ Total Parenteral Nutrition/Amino 

Acids/Dextrose/ Fat Emulsion Intravenous 1,512 ml @  63 mls/hr TPN  CONT IV  

Last administered on 3/18/20at 22:06;  Start 3/18/20 at 22:00;  Stop 3/19/20 at 

21:59;  Status DC


Calcium Carbonate/ Glycine (Tums) 500 mg PRN AFTMEALHC  PRN PO INDIGESTION;  

Start 3/18/20 at 17:45


Calcium Gluconate (Calcium Gluconate) 2,000 mg 1X  ONCE IVP  Last administered 

on 3/19/20at 02:19;  Start 3/19/20 at 02:15;  Stop 3/19/20 at 02:16;  Status DC


Calcium Chloride 3000 mg/Sodium Chloride 1,030 ml @  50 mls/hr [id number] IV  Last 

administered on 3/21/20at 02:17;  Start 3/19/20 at 08:00;  Stop 3/21/20 at 

15:23;  Status DC


Lorazepam (Ativan Inj) 1 mg PRN Q4HRS  PRN IVP ANXIETY / AGITATION Last admi

nistered on 3/23/20at 00:34;  Start 3/19/20 at 09:00


Sodium Chloride 1,000 ml @  1,000 mls/hr Q1H PRN IV hypotension;  Start 3/19/20 

at 08:56;  Stop 3/19/20 at 14:55;  Status DC


Albumin Human 200 ml @  200 mls/hr 1X PRN  PRN IV Hypotension;  Start 3/19/20 at

09:00;  Stop 3/19/20 at 14:59;  Status DC


Diphenhydramine HCl (Benadryl) 25 mg 1X PRN  PRN IV ITCHING;  Start 3/19/20 at 

09:00;  Stop 3/20/20 at 08:59;  Status DC


Diphenhydramine HCl (Benadryl) 25 mg 1X PRN  PRN IV ITCHING;  Start 3/19/20 at 

09:00;  Stop 3/20/20 at 08:59;  Status DC


Sodium Chloride 1,000 ml @  400 mls/hr Q2H30M PRN IV PATENCY;  Start 3/19/20 at 

08:56;  Stop 3/19/20 at 20:55;  Status DC


Info (PHARMACY MONITORING -- do not chart) 1 each PRN DAILY  PRN MC SEE 

COMMENTS;  Start 3/19/20 at 09:00;  Status UNV


Info (PHARMACY MONITORING -- do not chart) 1 each PRN DAILY  PRN MC SEE 

COMMENTS;  Start 3/19/20 at 09:00;  Stop 3/20/20 at 08:13;  Status DC


Digoxin (Lanoxin) 500 mcg 1X  ONCE IV  Last administered on 3/19/20at 10:04;  

Start 3/19/20 at 10:00;  Stop 3/19/20 at 10:01;  Status DC


Digoxin (Lanoxin) 125 mcg 1X  ONCE IV  Last administered on 3/19/20at 17:10;  

Start 3/19/20 at 18:00;  Stop 3/19/20 at 18:01;  Status DC


Magnesium Sulfate 100 ml @  25 mls/hr 1X  ONCE IV  Last administered on 3/19/20a

t 12:48;  Start 3/19/20 at 13:00;  Stop 3/19/20 at 16:59;  Status DC


Sodium Chloride 90 meq/Magnesium Sulfate 10 meq/ Calcium Gluconate 20 meq/ 

Multivitamins 10 ml/Chromium/ Copper/Manganese/ Seleni/Zn 0.5 ml/ Total 

Parenteral Nutrition/Amino Acids/Dextrose/ Fat Emulsion Intravenous 1,512 ml @  

63 mls/hr TPN  CONT IV  Last administered on 3/19/20at 22:25;  Start 3/19/20 at 

22:00;  Stop 3/20/20 at 21:59;  Status DC


Sodium Chloride 1,000 ml @  1,000 mls/hr Q1H PRN IV hypotension;  Start 3/20/20 

at 08:05;  Stop 3/20/20 at 14:04;  Status DC


Albumin Human 200 ml @  200 mls/hr 1X  ONCE IV  Last administered on 3/20/20at 

08:57;  Start 3/20/20 at 08:15;  Stop 3/20/20 at 09:14;  Status DC


Diphenhydramine HCl (Benadryl) 25 mg 1X PRN  PRN IV ITCHING;  Start 3/20/20 at 

08:15;  Stop 3/21/20 at 08:14;  Status DC


Diphenhydramine HCl (Benadryl) 25 mg 1X PRN  PRN IV ITCHING;  Start 3/20/20 at 

08:15;  Stop 3/21/20 at 08:14;  Status DC


Sodium Chloride 1,000 ml @  400 mls/hr Q2H30M PRN IV PATENCY;  Start 3/20/20 at 

08:05;  Stop 3/20/20 at 20:04;  Status DC


Info (PHARMACY MONITORING -- do not chart) 1 each PRN DAILY  PRN MC SEE 

COMMENTS;  Start 3/20/20 at 08:15;  Stop 3/24/20 at 07:57;  Status DC


Sodium Chloride 90 meq/Potassium Chloride 15 meq/ Potassium Phosphate 10 mmol/ 

Magnesium Sulfate 10 meq/Calcium Gluconate 20 meq/ Multivitamins 10 ml/Chromium/

Copper/Manganese/ Seleni/Zn 0.5 ml/ Total Parenteral Nutrition/Amino 

Acids/Dextrose/ Fat Emulsion Intravenous 1,512 ml @  63 mls/hr TPN  CONT IV  

Last administered on 3/20/20at 21:01;  Start 3/20/20 at 22:00;  Stop 3/21/20 at 

21:59;  Status DC


Potassium Chloride/Water 100 ml @  100 mls/hr 1X  ONCE IV  Last administered on 

3/20/20at 14:09;  Start 3/20/20 at 14:00;  Stop 3/20/20 at 14:59;  Status DC


Benzocaine (Hurricaine One) 1 spray 1X  ONCE MM  Last administered on 3/20/20at 

16:38;  Start 3/20/20 at 14:30;  Stop 3/20/20 at 14:31;  Status DC


Lidocaine HCl (Glydo (Lidocaine) Jelly) 1 thomas 1X  ONCE MM  Last administered on 

3/20/20at 16:38;  Start 3/20/20 at 14:30;  Stop 3/20/20 at 14:31;  Status DC


Linezolid/Dextrose 300 ml @  300 mls/hr Q12HR IV  Last administered on 3/26/20at

21:04;  Start 3/20/20 at 20:00;  Stop 3/27/20 at 07:50;  Status DC


Acetaminophen (Tylenol) 650 mg PRN Q6HRS  PRN PO MILD PAIN / TEMP;  Start 

3/21/20 at 03:30;  Stop 3/21/20 at 03:36;  Status DC


Acetaminophen (Tylenol) 650 mg PRN Q6HRS  PRN PEG MILD PAIN / TEMP Last 

administered on 3/26/20at 07:35;  Start 3/21/20 at 03:36


Sodium Chloride 1,000 ml @  1,000 mls/hr Q1H PRN IV hypotension;  Start 3/21/20 

at 07:50;  Stop 3/21/20 at 13:49;  Status DC


Albumin Human 200 ml @  200 mls/hr 1X PRN  PRN IV Hypotension;  Start 3/21/20 at

08:00;  Stop 3/21/20 at 13:59;  Status DC


Sodium Chloride (Normal Saline Flush) 10 ml 1X PRN  PRN IV AP catheter pack;  

Start 3/21/20 at 08:00;  Stop 3/22/20 at 07:59;  Status DC


Sodium Chloride (Normal Saline Flush) 10 ml 1X PRN  PRN IV  catheter pack;  

Start 3/21/20 at 08:00;  Stop 3/22/20 at 07:59;  Status DC


Sodium Chloride 1,000 ml @  400 mls/hr Q2H30M PRN IV PATENCY;  Start 3/21/20 at 

07:50;  Stop 3/21/20 at 19:49;  Status DC


Info (PHARMACY MONITORING -- do not chart) 1 each PRN DAILY  PRN MC SEE 

COMMENTS;  Start 3/21/20 at 08:00;  Status UNV


Info (PHARMACY MONITORING -- do not chart) 1 each PRN DAILY  PRN MC SEE 

COMMENTS;  Start 3/21/20 at 08:00;  Stop 3/23/20 at 08:25;  Status DC


Sodium Chloride 90 meq/Potassium Chloride 15 meq/ Potassium Phosphate 10 mmol/ 

Magnesium Sulfate 10 meq/Calcium Gluconate 20 meq/ Multivitamins 10 ml/Chromium/

Copper/Manganese/ Seleni/Zn 0.5 ml/ Total Parenteral Nutrition/Amino 

Acids/Dextrose/ Fat Emulsion Intravenous 1,512 ml @  63 mls/hr TPN  CONT IV  

Last administered on 3/21/20at 20:57;  Start 3/21/20 at 22:00;  Stop 3/22/20 at 

21:59;  Status DC


Sodium Chloride 90 meq/Potassium Chloride 15 meq/ Potassium Phosphate 15 mmol/ 

Magnesium Sulfate 10 meq/Calcium Gluconate 20 meq/ Multivitamins 10 ml/Chromium/

Copper/Manganese/ Seleni/Zn 0.5 ml/ Total Parenteral Nutrition/Amino 

Acids/Dextrose/ Fat Emulsion Intravenous 1,512 ml @  63 mls/hr TPN  CONT IV ;  

Start 3/22/20 at 22:00;  Stop 3/22/20 at 14:16;  Status DC


Sodium Chloride 90 meq/Potassium Chloride 15 meq/ Potassium Phosphate 15 mmol/ 

Magnesium Sulfate 10 meq/Calcium Gluconate 20 meq/ Multivitamins 10 ml/Chromium/

Copper/Manganese/ Seleni/Zn 0.5 ml/ Total Parenteral Nutrition/Amino 

Acids/Dextrose/ Fat Emulsion Intravenous 1,200 ml @  50 mls/hr TPN  CONT IV ;  

Start 3/22/20 at 22:00;  Stop 3/22/20 at 14:17;  Status DC


Sodium Chloride 90 meq/Potassium Chloride 15 meq/ Potassium Phosphate 10 mmol/ 

Magnesium Sulfate 10 meq/Calcium Gluconate 20 meq/ Multivitamins 10 ml/Chromium/

Copper/Manganese/ Seleni/Zn 0.5 ml/ Total Parenteral Nutrition/Amino 

Acids/Dextrose/ Fat Emulsion Intravenous 1,200 ml @  50 mls/hr TPN  CONT IV  

Last administered on 3/22/20at 23:29;  Start 3/22/20 at 22:00;  Stop 3/23/20 at 

21:59;  Status DC


Sodium Chloride 1,000 ml @  1,000 mls/hr Q1H PRN IV hypotension;  Start 3/23/20 

at 07:28;  Stop 3/23/20 at 13:27;  Status DC


Albumin Human 200 ml @  200 mls/hr 1X  ONCE IV  Last administered on 3/23/20at 

08:51;  Start 3/23/20 at 07:30;  Stop 3/23/20 at 08:29;  Status DC


Diphenhydramine HCl (Benadryl) 25 mg 1X PRN  PRN IV ITCHING;  Start 3/23/20 at 

07:30;  Stop 3/24/20 at 07:29;  Status DC


Diphenhydramine HCl (Benadryl) 25 mg 1X PRN  PRN IV ITCHING;  Start 3/23/20 at 

07:30;  Stop 3/24/20 at 07:29;  Status DC


Sodium Chloride 1,000 ml @  400 mls/hr Q2H30M PRN IV PATENCY;  Start 3/23/20 at 

07:28;  Stop 3/23/20 at 19:27;  Status DC


Info (PHARMACY MONITORING -- do not chart) 1 each PRN DAILY  PRN MC SEE 

COMMENTS;  Start 3/23/20 at 07:30


Metronidazole 100 ml @  100 mls/hr Q6HRS IV  Last administered on 4/2/20at 

11:25;  Start 3/23/20 at 08:30


Micafungin Sodium 100 mg/Dextrose 100 ml @  100 mls/hr Q24H IV  Last 

administered on 4/2/20at 09:11;  Start 3/23/20 at 09:00


Propofol 0 ml @ As Directed STK-MED ONCE IV ;  Start 3/23/20 at 07:53;  Stop 

3/23/20 at 07:53;  Status DC


Etomidate (Amidate) 20 mg STK-MED ONCE IV ;  Start 3/23/20 at 07:53;  Stop 

3/23/20 at 07:54;  Status DC


Midazolam HCl (Versed) 5 mg STK-MED ONCE .ROUTE ;  Start 3/23/20 at 07:57;  Stop

3/23/20 at 07:57;  Status DC


Fentanyl Citrate 30 ml @ 0 mls/hr CONT  PRN IV SEE PROTOCOL Last administered on

4/2/20at 05:28;  Start 3/23/20 at 08:15


Artificial Tears (Artificial Tears) 1 drop PRN Q1HR  PRN OU DRY EYE;  Start 

3/23/20 at 08:15


Midazolam HCl 50 mg/Sodium Chloride 50 ml @ 0 mls/hr CONT  PRN IV SEE PROTOCOL 

Last administered on 3/26/20at 22:39;  Start 3/23/20 at 08:15;  Stop 3/28/20 at 

15:59;  Status DC


Etomidate (Amidate) 8 mg 1X  ONCE IV  Last administered on 3/23/20at 08:33;  

Start 3/23/20 at 08:30;  Stop 3/23/20 at 08:31;  Status DC


Succinylcholine Chloride (Anectine) 120 mg 1X  ONCE IV  Last administered on 

3/23/20at 08:34;  Start 3/23/20 at 08:30;  Stop 3/23/20 at 08:31;  Status DC


Midazolam HCl (Versed) 5 mg 1X  ONCE IV ;  Start 3/23/20 at 08:30;  Stop 3/23/20

at 08:31;  Status DC


Potassium Chloride 15 meq/ Bicarbonate Dialysis Soln w/ out KCl 5,007.5 ml  @ 

1,000 mls/ hr Q5H1M IV  Last administered on 3/24/20at 11:11;  Start 3/23/20 at 

12:00;  Stop 3/24/20 at 11:15;  Status DC


Potassium Chloride 15 meq/ Bicarbonate Dialysis Soln w/ out KCl 5,007.5 ml  @ 

1,000 mls/ hr Q5H1M IV  Last administered on 3/24/20at 11:12;  Start 3/23/20 at 

12:00;  Stop 3/24/20 at 11:17;  Status DC


Potassium Chloride 15 meq/ Bicarbonate Dialysis Soln w/ out KCl 5,007.5 ml  @ 

1,000 mls/ hr Q5H1M IV  Last administered on 3/24/20at 11:11;  Start 3/23/20 at 

12:00;  Stop 3/24/20 at 11:19;  Status DC


Sodium Chloride 90 meq/Potassium Chloride 15 meq/ Potassium Phosphate 10 mmol/ 

Magnesium Sulfate 10 meq/Calcium Gluconate 20 meq/ Multivitamins 10 ml/Chromium/

Copper/Manganese/ Seleni/Zn 0.5 ml/ Total Parenteral Nutrition/Amino 

Acids/Dextrose/ Fat Emulsion Intravenous 1,400 ml @  58.333 mls/ hr TPN  CONT IV

 Last administered on 3/23/20at 21:42;  Start 3/23/20 at 22:00;  Stop 3/24/20 at

21:59;  Status DC


Heparin Sodium (Porcine) (Heparin Sodium) 5,000 unit Q8HRS SQ  Last administered

on 3/28/20at 05:55;  Start 3/23/20 at 15:00;  Stop 3/28/20 at 13:28;  Status DC


Meropenem 500 mg/ Sodium Chloride 50 ml @  100 mls/hr Q6HRS IV  Last 

administered on 3/25/20at 06:00;  Start 3/24/20 at 09:00;  Stop 3/25/20 at 

07:29;  Status DC


Potassium Phosphate 20 mmol/ Sodium Chloride 106.6667 ml @  51.667 m... 1X  ONCE

IV  Last administered on 3/24/20at 11:22;  Start 3/24/20 at 10:15;  Stop 3/24/20

at 12:18;  Status DC


Acetaminophen (Tylenol Supp) 650 mg PRN Q6HRS  PRN IA MILD PAIN / TEMP Last 

administered on 3/24/20at 10:37;  Start 3/24/20 at 10:30


Potassium Chloride/Water 100 ml @  100 mls/hr Q1H IV  Last administered on 

3/24/20at 12:12;  Start 3/24/20 at 11:00;  Stop 3/24/20 at 12:59;  Status DC


Potassium Chloride 20 meq/ Bicarbonate Dialysis Soln w/ out KCl 5,010 ml @  

1,000 mls/hr Q5H1M IV  Last administered on 3/25/20at 08:48;  Start 3/24/20 at 

12:00;  Stop 3/25/20 at 13:03;  Status DC


Potassium Chloride 20 meq/ Bicarbonate Dialysis Soln w/ out KCl 5,010 ml @  

1,000 mls/hr Q5H1M IV  Last administered on 3/29/20at 14:52;  Start 3/24/20 at 

11:30;  Stop 3/29/20 at 19:59;  Status DC


Potassium Chloride 20 meq/ Bicarbonate Dialysis Soln w/ out KCl 5,010 ml @  

1,000 mls/hr Q5H1M IV  Last administered on 3/29/20at 14:53;  Start 3/24/20 at 

11:30;  Stop 3/29/20 at 19:59;  Status DC


Sodium Chloride 90 meq/Potassium Chloride 15 meq/ Potassium Phosphate 15 mmol/ 

Magnesium Sulfate 10 meq/Calcium Gluconate 15 meq/ Multivitamins 10 ml/Chromium/

Copper/Manganese/ Seleni/Zn 0.5 ml/ Total Parenteral Nutrition/Amino 

Acids/Dextrose/ Fat Emulsion Intravenous 1,400 ml @  58.333 mls/ hr TPN  CONT IV

 Last administered on 3/24/20at 22:17;  Start 3/24/20 at 22:00;  Stop 3/25/20 at

21:59;  Status DC


Cefepime HCl (Maxipime) 2 gm Q12HR IVP  Last administered on 4/2/20at 09:11;  

Start 3/25/20 at 09:00


Daptomycin 500 mg/ Sodium Chloride 50 ml @  100 mls/hr Q48H IV  Last 

administered on 4/2/20at 08:29;  Start 3/25/20 at 08:30


Lidocaine HCl (Buffered Lidocaine 1%) 3 ml 1X  ONCE INJ  Last administered on 

3/25/20at 10:27;  Start 3/25/20 at 10:30;  Stop 3/25/20 at 10:31;  Status DC


Potassium Phosphate 20 mmol/ Sodium Chloride 106.6667 ml @  51.667 m... 1X  ONCE

IV  Last administered on 3/25/20at 12:51;  Start 3/25/20 at 13:00;  Stop 3/25/20

at 15:03;  Status DC


Sodium Chloride 90 meq/Potassium Chloride 15 meq/ Potassium Phosphate 18 mmol/ 

Magnesium Sulfate 8 meq/Calcium Gluconate 15 meq/ Multivitamins 10 ml/Chromium/ 

Copper/Manganese/ Seleni/Zn 0.5 ml/ Total Parenteral Nutrition/Amino 

Acids/Dextrose/ Fat Emulsion Intravenous 1,400 ml @  58.333 mls/ hr TPN  CONT IV

 Last administered on 3/25/20at 22:16;  Start 3/25/20 at 22:00;  Stop 3/26/20 at

21:59;  Status DC


Potassium Chloride 20 meq/ Bicarbonate Dialysis Soln w/ out KCl 5,010 ml @  

1,000 mls/hr Q5H1M IV  Last administered on 3/29/20at 14:54;  Start 3/25/20 at 

16:00;  Stop 3/29/20 at 19:59;  Status DC


Multi-Ingred Cream/Lotion/Oil/ Oint (Artificial Tears Eye Ointment) 1 thomas PRN 

Q1HR  PRN OU DRY EYE Last administered on 3/30/20at 08:05;  Start 3/25/20 at 

17:30


Sodium Chloride 90 meq/Potassium Chloride 15 meq/ Potassium Phosphate 18 mmol/ 

Magnesium Sulfate 8 meq/Calcium Gluconate 15 meq/ Multivitamins 10 ml/Chromium/ 

Copper/Manganese/ Seleni/Zn 0.5 ml/ Total Parenteral Nutrition/Amino 

Acids/Dextrose/ Fat Emulsion Intravenous 1,400 ml @  58.333 mls/ hr TPN  CONT IV

 Last administered on 3/26/20at 22:00;  Start 3/26/20 at 22:00;  Stop 3/27/20 at

21:59;  Status DC


Albumin Human 500 ml @  125 mls/hr 1X  ONCE IV ;  Start 3/26/20 at 14:15;  Stop 

3/26/20 at 18:14;  Status DC


Sodium Chloride 90 meq/Potassium Chloride 15 meq/ Potassium Phosphate 18 mmol/ 

Magnesium Sulfate 8 meq/Calcium Gluconate 15 meq/ Multivitamins 10 ml/Chromium/ 

Copper/Manganese/ Seleni/Zn 0.5 ml/ Insulin Human Regular 10 unit/ Total 

Parenteral Nutrition/Amino Acids/Dextrose/ Fat Emulsion Intravenous 1,400 ml @  

58.333 mls/ hr TPN  CONT IV  Last administered on 3/27/20at 21:43;  Start 

3/27/20 at 22:00;  Stop 3/28/20 at 21:59;  Status DC


Lidocaine HCl (Buffered Lidocaine 1%) 3 ml STK-MED ONCE .ROUTE ;  Start 3/25/20 

at 10:00;  Stop 3/27/20 at 13:57;  Status DC


Midazolam HCl 100 mg/Sodium Chloride 100 ml @ 7 mls/hr CONT  PRN IV SEE PROTOCOL

Last administered on 4/1/20at 13:57;  Start 3/28/20 at 16:00


Sodium Chloride 90 meq/Potassium Chloride 15 meq/ Potassium Phosphate 18 mmol/ 

Magnesium Sulfate 8 meq/Calcium Gluconate 15 meq/ Multivitamins 10 ml/Chromium/ 

Copper/Manganese/ Seleni/Zn 0.5 ml/ Insulin Human Regular 15 unit/ Total 

Parenteral Nutrition/Amino Acids/Dextrose/ Fat Emulsion Intravenous 1,400 ml @  

58.333 mls/ hr TPN  CONT IV  Last administered on 3/28/20at 20:34;  Start 

3/28/20 at 22:00;  Stop 3/29/20 at 21:59;  Status DC


Info ([facility] Electrolyte Protocol) 1 ea CONT PRN  PRN MC PER PROTOCOL;  Start 

3/29/20 at 13:15


Sodium Chloride 90 meq/Potassium Chloride 15 meq/ Potassium Phosphate 18 mmol/ 

Magnesium Sulfate 8 meq/Calcium Gluconate 15 meq/ Multivitamins 10 ml/Chromium/ 

Copper/Manganese/ Seleni/Zn 0.5 ml/ Insulin Human Regular 15 unit/ Total 

Parenteral Nutrition/Amino Acids/Dextrose/ Fat Emulsion Intravenous 1,400 ml @  

58.333 mls/ hr TPN  CONT IV  Last administered on 3/29/20at 22:05;  Start 

3/29/20 at 22:00;  Stop 3/30/20 at 21:59;  Status DC


Potassium Chloride 15 meq/ Bicarbonate Dialysis Soln w/ out KCl 5,007.5 ml  @ 

1,000 mls/ hr Q5H1M IV  Last administered on 4/1/20at 18:14;  Start 3/29/20 at 

20:00;  Stop 4/2/20 at 13:08;  Status DC


Potassium Chloride 15 meq/ Bicarbonate Dialysis Soln w/ out KCl 5,007.5 ml  @ 

1,000 mls/ hr Q5H1M IV  Last administered on 4/1/20at 18:14;  Start 3/29/20 at 

20:00;  Stop 4/2/20 at 13:08;  Status DC


Potassium Chloride 15 meq/ Bicarbonate Dialysis Soln w/ out KCl 5,007.5 ml  @ 

1,000 mls/ hr Q5H1M IV  Last administered on 4/1/20at 18:14;  Start 3/29/20 at 

20:00;  Stop 4/2/20 at 13:08;  Status DC


Iohexol (Omnipaque 240 Mg/ml) 30 ml 1X  ONCE PO  Last administered on 3/30/20at 

11:30;  Start 3/30/20 at 11:30;  Stop 3/30/20 at 11:33;  Status DC


Info (CONTRAST GIVEN -- Rx MONITORING) 1 each PRN DAILY  PRN MC SEE COMMENTS;  

Start 3/30/20 at 11:45;  Stop 4/1/20 at 11:44;  Status DC


Sodium Chloride 90 meq/Potassium Chloride 15 meq/ Potassium Phosphate 18 mmol/ 

Magnesium Sulfate 8 meq/Calcium Gluconate 15 meq/ Multivitamins 10 ml/Chromium/ 

Copper/Manganese/ Seleni/Zn 0.5 ml/ Insulin Human Regular 15 unit/ Total 

Parenteral Nutrition/Amino Acids/Dextrose/ Fat Emulsion Intravenous 1,400 ml @  

58.333 mls/ hr TPN  CONT IV  Last administered on 3/30/20at 21:47;  Start 

3/30/20 at 22:00;  Stop 3/31/20 at 21:59;  Status DC


Sodium Chloride 90 meq/Potassium Chloride 15 meq/ Potassium Phosphate 18 mmol/ 

Magnesium Sulfate 8 meq/Calcium Gluconate 15 meq/ Multivitamins 10 ml/Chromium/ 

Copper/Manganese/ Seleni/Zn 0.5 ml/ Insulin Human Regular 20 unit/ Total 

Parenteral Nutrition/Amino Acids/Dextrose/ Fat Emulsion Intravenous 1,400 ml @  

58.333 mls/ hr TPN  CONT IV  Last administered on 3/31/20at 21:36;  Start 

3/31/20 at 22:00;  Stop 4/1/20 at 21:59;  Status DC


Alteplase, Recombinant (Cathflo For Central Catheter Clearance) 1 mg 1X  ONCE 

INT CAT  Last administered on 3/31/20at 20:03;  Start 3/31/20 at 19:30;  Stop 

3/31/20 at 19:46;  Status DC


Alteplase, Recombinant (Cathflo For Central Catheter Clearance) 1 mg 1X  ONCE 

INT CAT  Last administered on 3/31/20at 22:05;  Start 3/31/20 at 22:00;  Stop 

3/31/20 at 22:01;  Status DC


Sodium Chloride 90 meq/Potassium Chloride 15 meq/ Potassium Phosphate 18 mmol/ 

Magnesium Sulfate 8 meq/Calcium Gluconate 15 meq/ Multivitamins 10 ml/Chromium/ 

Copper/Manganese/ Seleni/Zn 0.5 ml/ Insulin Human Regular 20 unit/ Total 

Parenteral Nutrition/Amino Acids/Dextrose/ Fat Emulsion Intravenous 1,400 ml @  

58.333 mls/ hr TPN  CONT IV  Last administered on 4/1/20at 21:30;  Start 4/1/20 

at 22:00;  Stop 4/2/20 at 21:59


Dexmedetomidine HCl 400 mcg/ Sodium Chloride 100 ml @ 0 mls/hr CONT  PRN IV 

ANXIETY / AGITATION Last administered on 4/2/20at 13:37;  Start 4/2/20 at 08:15


Sodium Chloride 500 ml @  500 mls/hr 1X PRN  PRN IV ELEVATED BP, SEE COMMENTS;  

Start 4/2/20 at 08:15


Atropine Sulfate (ATROPINE 0.5mg SYRINGE) 0.5 mg PRN Q5MIN  PRN IV SEE COMMENTS;

 Start 4/2/20 at 08:15


Furosemide (Lasix) 20 mg 1X  ONCE IVP  Last administered on 4/2/20at 08:19;  

Start 4/2/20 at 08:15;  Stop 4/2/20 at 08:16;  Status DC


Lidocaine HCl (Buffered Lidocaine 1%) 3 ml STK-MED ONCE .ROUTE ;  Start 4/2/20 

at 08:39;  Stop 4/2/20 at 08:39;  Status DC


Lidocaine HCl (Buffered Lidocaine 1%) 6 ml 1X  ONCE INJ  Last administered on 

4/2/20at 09:05;  Start 4/2/20 at 09:00;  Stop 4/2/20 at 09:06;  Status DC


Sodium Chloride 90 meq/Potassium Chloride 15 meq/ Potassium Phosphate 18 mmol/ 

Magnesium Sulfate 8 meq/Calcium Gluconate 15 meq/ Multivitamins 10 ml/Chromium/ 

Copper/Manganese/ Seleni/Zn 0.5 ml/ Insulin Human Regular 20 unit/ Total 

Parenteral Nutrition/Amino Acids/Dextrose/ Fat Emulsion Intravenous 1,400 ml @  

58.333 mls/ hr TPN  CONT IV ;  Start 4/2/20 at 22:00;  Stop 4/3/20 at 21:59





Active Scripts


Active


Reported


Bisoprolol Fumarate 5 Mg Tablet 10 Mg PO DAILY


Vitals/I & O





Vital Sign - Last 24 Hours








 4/1/20 4/1/20 4/1/20 4/1/20





 15:41 16:00 16:57 17:00


 


Temp  99.0  





  99.0  


 


Pulse  110  101


 


Resp  25  25


 


B/P (MAP)  106/64 (78)  107/67 (80)


 


Pulse Ox  100 100 100


 


O2 Delivery Mechanical Ventilator Ventilator Ventilator Ventilator


 


    





    





 4/1/20 4/1/20 4/1/20 4/1/20





 18:00 19:00 20:00 20:00


 


Temp   98.1 





   98.1 


 


Pulse 97 107 104 


 


Resp 25 25 25 


 


B/P (MAP) 106/62 (77) 107/59 (75) 116/63 (80) 


 


Pulse Ox 100 100 100 


 


O2 Delivery Ventilator Ventilator Ventilator Mechanical Ventilator


 


O2 Flow Rate    6.0


 


    





    





 4/1/20 4/1/20 4/1/20 4/1/20





 21:00 22:00 22:54 23:00


 


Pulse 112 104  104


 


Resp 25 26 27


 


B/P (MAP) 129/72 (91) 115/81 (92)  119/50 (73)


 


Pulse Ox 100 100 100 100


 


O2 Delivery Ventilator Ventilator Ventilator Ventilator





 4/2/20 4/2/20 4/2/20 4/2/20





 00:00 00:00 01:00 02:00


 


Temp  98.2  





  98.2  


 


Pulse  109 109 114


 


Resp  25 25 26


 


B/P (MAP)  102/69 (80) 112/84 (93) 119/63 (81)


 


Pulse Ox  100 100 100


 


O2 Delivery Mechanical Ventilator Ventilator Ventilator Ventilator


 


    





    





 4/2/20 4/2/20 4/2/20 4/2/20





 02:09 03:00 03:56 04:00


 


Temp    100.1





    100.1


 


Pulse  114  120


 


Resp  24  24


 


B/P (MAP)  118/67 (84)  120/67 (84)


 


Pulse Ox 100 100  100


 


O2 Delivery Ventilator Ventilator Mechanical Ventilator Ventilator


 


    





    





 4/2/20 4/2/20 4/2/20 4/2/20





 05:00 05:07 05:28 06:00


 


Pulse 116   118


 


Resp 24 24 25


 


B/P (MAP) 122/62 (82)   122/74 (90)


 


Pulse Ox 100 100 100 100


 


O2 Delivery Ventilator Ventilator Ventilator Ventilator


 


O2 Flow Rate   6.0 





 4/2/20 4/2/20 4/2/20 4/2/20





 07:00 07:52 07:53 08:00


 


Temp    100.2





    100.2


 


Pulse 115   121


 


Resp 25 25


 


B/P (MAP) 109/62 (78)   136/72 (93)


 


Pulse Ox 100 100  100


 


O2 Delivery Ventilator Ventilator Mechanical Ventilator Ventilator


 


    





    





 4/2/20 4/2/20 4/2/20 4/2/20





 09:00 10:00 11:00 11:35


 


Pulse 94 97 92 


 


Resp 25 25 25 


 


B/P (MAP) 144/85 (104) 94/64 (74) 115/66 (82) 


 


Pulse Ox 100 100 100 


 


O2 Delivery Ventilator Ventilator Ventilator Mechanical Ventilator





 4/2/20 4/2/20 4/2/20 4/2/20





 12:00 12:29 13:00 14:00


 


Temp 99.6   





 99.6   


 


Pulse 94  92 90


 


Resp 25 25 25


 


B/P (MAP) 119/71 (87)  111/65 (80) 110/64 (79)


 


Pulse Ox 100 100 100 100


 


O2 Delivery Ventilator Ventilator Ventilator Ventilator














Intake and Output   


 


 4/1/20 4/1/20 4/2/20





 15:00 23:00 07:00


 


Intake Total 200 ml 953 ml 1217 ml


 


Output Total 270 ml 215 ml 370 ml


 


Balance -70 ml 738 ml 847 ml











Hemodynamically unstable?:  No


Is patient in severe pain?:  No


Is NPO status required?:  Yes











HIRAM BARRERA MD              Apr 2, 2020 15:04

## 2020-04-02 NOTE — NUR
SS following up with discharge planning. SS discussed with pt RN. [facility] continuing to follow 
for self pay status. Pt remains on the vent at this time. CRRT stopped today. Per pt's RN, 
hemodialysis will be attempted tomorrow, 4/3/2020, and if not CRRT may be restarted. 
Tentative trach scheduled for Monday, 4/6/2020. SS will continue to follow for discharge 
planning.

## 2020-04-03 VITALS — SYSTOLIC BLOOD PRESSURE: 105 MMHG | DIASTOLIC BLOOD PRESSURE: 59 MMHG

## 2020-04-03 VITALS — SYSTOLIC BLOOD PRESSURE: 116 MMHG | DIASTOLIC BLOOD PRESSURE: 68 MMHG

## 2020-04-03 VITALS — SYSTOLIC BLOOD PRESSURE: 108 MMHG | DIASTOLIC BLOOD PRESSURE: 60 MMHG

## 2020-04-03 VITALS — DIASTOLIC BLOOD PRESSURE: 68 MMHG | SYSTOLIC BLOOD PRESSURE: 121 MMHG

## 2020-04-03 VITALS — DIASTOLIC BLOOD PRESSURE: 61 MMHG | SYSTOLIC BLOOD PRESSURE: 93 MMHG

## 2020-04-03 VITALS — SYSTOLIC BLOOD PRESSURE: 92 MMHG | DIASTOLIC BLOOD PRESSURE: 53 MMHG

## 2020-04-03 VITALS — DIASTOLIC BLOOD PRESSURE: 62 MMHG | SYSTOLIC BLOOD PRESSURE: 108 MMHG

## 2020-04-03 VITALS — SYSTOLIC BLOOD PRESSURE: 131 MMHG | DIASTOLIC BLOOD PRESSURE: 66 MMHG

## 2020-04-03 VITALS — SYSTOLIC BLOOD PRESSURE: 94 MMHG | DIASTOLIC BLOOD PRESSURE: 51 MMHG

## 2020-04-03 VITALS — SYSTOLIC BLOOD PRESSURE: 103 MMHG | DIASTOLIC BLOOD PRESSURE: 62 MMHG

## 2020-04-03 VITALS — DIASTOLIC BLOOD PRESSURE: 52 MMHG | SYSTOLIC BLOOD PRESSURE: 98 MMHG

## 2020-04-03 VITALS — SYSTOLIC BLOOD PRESSURE: 84 MMHG | DIASTOLIC BLOOD PRESSURE: 42 MMHG

## 2020-04-03 VITALS — DIASTOLIC BLOOD PRESSURE: 63 MMHG | SYSTOLIC BLOOD PRESSURE: 98 MMHG

## 2020-04-03 VITALS — DIASTOLIC BLOOD PRESSURE: 60 MMHG | SYSTOLIC BLOOD PRESSURE: 98 MMHG

## 2020-04-03 VITALS — SYSTOLIC BLOOD PRESSURE: 85 MMHG | DIASTOLIC BLOOD PRESSURE: 54 MMHG

## 2020-04-03 VITALS — SYSTOLIC BLOOD PRESSURE: 104 MMHG | DIASTOLIC BLOOD PRESSURE: 61 MMHG

## 2020-04-03 VITALS — DIASTOLIC BLOOD PRESSURE: 68 MMHG | SYSTOLIC BLOOD PRESSURE: 112 MMHG

## 2020-04-03 VITALS — SYSTOLIC BLOOD PRESSURE: 110 MMHG | DIASTOLIC BLOOD PRESSURE: 71 MMHG

## 2020-04-03 VITALS — DIASTOLIC BLOOD PRESSURE: 65 MMHG | SYSTOLIC BLOOD PRESSURE: 114 MMHG

## 2020-04-03 VITALS — DIASTOLIC BLOOD PRESSURE: 56 MMHG | SYSTOLIC BLOOD PRESSURE: 102 MMHG

## 2020-04-03 VITALS — SYSTOLIC BLOOD PRESSURE: 101 MMHG | DIASTOLIC BLOOD PRESSURE: 59 MMHG

## 2020-04-03 VITALS — SYSTOLIC BLOOD PRESSURE: 87 MMHG | DIASTOLIC BLOOD PRESSURE: 45 MMHG

## 2020-04-03 VITALS — SYSTOLIC BLOOD PRESSURE: 133 MMHG | DIASTOLIC BLOOD PRESSURE: 72 MMHG

## 2020-04-03 LAB
% BANDS: 19 % (ref 0–9)
% LYMPHS: 13 % (ref 24–48)
% MONOS: 4 % (ref 0–10)
% MYELOS: 2 % (ref 0–0)
% SEGS: 58 % (ref 35–66)
ALBUMIN SERPL-MCNC: 1.8 G/DL (ref 3.4–5)
ALBUMIN/GLOB SERPL: 0.8 {RATIO} (ref 1–1.7)
ALP SERPL-CCNC: 95 U/L (ref 46–116)
ALT SERPL-CCNC: 20 U/L (ref 14–59)
ANION GAP SERPL CALC-SCNC: 14 MMOL/L (ref 6–14)
ANISOCYTOSIS BLD QL SMEAR: SLIGHT
AST SERPL-CCNC: 46 U/L (ref 15–37)
BASE EXCESS ABG: 1 MMOL/L (ref -3–3)
BASOPHILS # BLD AUTO: 0.1 X10^3/UL (ref 0–0.2)
BASOPHILS NFR BLD: 0 % (ref 0–3)
BILIRUB SERPL-MCNC: 0.9 MG/DL (ref 0.2–1)
BUN SERPL-MCNC: 63 MG/DL (ref 7–20)
BUN/CREAT SERPL: 32 (ref 6–20)
CALCIUM SERPL-MCNC: 8 MG/DL (ref 8.5–10.1)
CHLORIDE SERPL-SCNC: 107 MMOL/L (ref 98–107)
CO2 SERPL-SCNC: 20 MMOL/L (ref 21–32)
CREAT SERPL-MCNC: 2 MG/DL (ref 0.6–1)
EOSINOPHIL NFR BLD AUTO: 4 % (ref 0–5)
EOSINOPHIL NFR BLD: 0.4 X10^3/UL (ref 0–0.7)
EOSINOPHIL NFR BLD: 2 % (ref 0–3)
ERYTHROCYTE [DISTWIDTH] IN BLOOD BY AUTOMATED COUNT: 18.9 % (ref 11.5–14.5)
GFR SERPLBLD BASED ON 1.73 SQ M-ARVRAT: 26.5 ML/MIN
GLOBULIN SER-MCNC: 2.3 G/DL (ref 2.2–3.8)
GLUCOSE SERPL-MCNC: 212 MG/DL (ref 70–99)
HCO3 BLDA-SCNC: 23 MMOL/L (ref 21–28)
HCT VFR BLD CALC: 25.8 % (ref 36–47)
HGB BLD-MCNC: 8.2 G/DL (ref 12–15.5)
INSPIRATION SETTING TIME VENT: 40
LYMPHOCYTES # BLD: 1.1 X10^3/UL (ref 1–4.8)
LYMPHOCYTES NFR BLD AUTO: 7 % (ref 24–48)
MAGNESIUM SERPL-MCNC: 2.1 MG/DL (ref 1.8–2.4)
MCH RBC QN AUTO: 32 PG (ref 25–35)
MCHC RBC AUTO-ENTMCNC: 32 G/DL (ref 31–37)
MCV RBC AUTO: 101 FL (ref 79–100)
MONO #: 1.2 X10^3/UL (ref 0–1.1)
MONOCYTES NFR BLD: 8 % (ref 0–9)
NEUT #: 13 X10^3/UL (ref 1.8–7.7)
NEUTROPHILS NFR BLD AUTO: 83 % (ref 31–73)
NRBC # BLD MANUAL: 2 10*3/UL
PCO2 BLDA: 27 MMHG (ref 35–46)
PHOSPHATE SERPL-MCNC: 4.7 MG/DL (ref 2.6–4.7)
PLATELET # BLD AUTO: 233 X10^3/UL (ref 140–400)
PLATELET # BLD EST: ADEQUATE 10*3/UL
PO2 BLDA: 99 MMHG (ref 75–108)
POTASSIUM SERPL-SCNC: 3.8 MMOL/L (ref 3.5–5.1)
PROT SERPL-MCNC: 4.1 G/DL (ref 6.4–8.2)
RBC # BLD AUTO: 2.57 X10^6/UL (ref 3.5–5.4)
SAO2 % BLDA: 97 % (ref 92–99)
SODIUM SERPL-SCNC: 141 MMOL/L (ref 136–145)
WBC # BLD AUTO: 15.7 X10^3/UL (ref 4–11)

## 2020-04-03 RX ADMIN — INSULIN LISPRO SCH UNITS: 100 INJECTION, SOLUTION INTRAVENOUS; SUBCUTANEOUS at 00:29

## 2020-04-03 RX ADMIN — CEFEPIME SCH GM: 2 INJECTION, POWDER, FOR SOLUTION INTRAVENOUS at 12:43

## 2020-04-03 RX ADMIN — DEXMEDETOMIDINE HYDROCHLORIDE PRN MLS/HR: 100 INJECTION, SOLUTION, CONCENTRATE INTRAVENOUS at 07:49

## 2020-04-03 RX ADMIN — INSULIN LISPRO SCH UNITS: 100 INJECTION, SOLUTION INTRAVENOUS; SUBCUTANEOUS at 13:17

## 2020-04-03 RX ADMIN — DEXMEDETOMIDINE HYDROCHLORIDE PRN MLS/HR: 100 INJECTION, SOLUTION, CONCENTRATE INTRAVENOUS at 21:19

## 2020-04-03 RX ADMIN — CEFEPIME SCH GM: 2 INJECTION, POWDER, FOR SOLUTION INTRAVENOUS at 20:19

## 2020-04-03 RX ADMIN — DEXMEDETOMIDINE HYDROCHLORIDE PRN MLS/HR: 100 INJECTION, SOLUTION, CONCENTRATE INTRAVENOUS at 13:52

## 2020-04-03 RX ADMIN — PANTOPRAZOLE SODIUM SCH MG: 40 INJECTION, POWDER, FOR SOLUTION INTRAVENOUS at 07:49

## 2020-04-03 RX ADMIN — INSULIN LISPRO SCH UNITS: 100 INJECTION, SOLUTION INTRAVENOUS; SUBCUTANEOUS at 17:04

## 2020-04-03 RX ADMIN — Medication PRN EACH: at 13:51

## 2020-04-03 RX ADMIN — DEXTROSE SCH MLS/HR: 50 INJECTION, SOLUTION INTRAVENOUS at 12:43

## 2020-04-03 RX ADMIN — AMIODARONE HYDROCHLORIDE PRN MLS/HR: 50 INJECTION, SOLUTION INTRAVENOUS at 07:51

## 2020-04-03 RX ADMIN — MIDAZOLAM HYDROCHLORIDE PRN MLS/HR: 5 INJECTION, SOLUTION INTRAMUSCULAR; INTRAVENOUS at 21:19

## 2020-04-03 RX ADMIN — HEPARIN SODIUM SCH UNIT: 5000 INJECTION, SOLUTION INTRAVENOUS; SUBCUTANEOUS at 20:20

## 2020-04-03 RX ADMIN — INSULIN LISPRO SCH UNITS: 100 INJECTION, SOLUTION INTRAVENOUS; SUBCUTANEOUS at 05:53

## 2020-04-03 RX ADMIN — Medication PRN APP: at 11:05

## 2020-04-03 NOTE — PDOC
PROGRESS NOTES


Chief Complaint


Chief Complaint


Respiratory failure requiring mechanical ventilation


Severe Acute gallstone pancreatitis (not a surgical candidate at this time) with

necrosis


Acute kidney failure now requiring dialysis


Salpingitis


Gallstones (Calculus of gallbladder with acute cholecystitis without 

obstruction)


HTN 


Leukocytosis 


Hypoxia


Uterine fibroid


Hypoxia with respiratory failure


Intractable pain


Intractable nausea


Covid 19 negative. 


Acute on chronic anemia will have to follow up since there is suspicion for 

hemorrhagic fluid in pelvis





Plan:





continue supportive measures


grim prognosis


discussed with surgical consultant


planning of trach for monday if unable to extubate





History of Present Illness


History of Present Illness


4/3/2020


No new changes remains critically ill, off CRRT, still planning for trach on 

Monday unless we manage to wean over the weekend





4/2/2020


Continues to remain critically ill.  The patient unable to be taken off 

ventilatory support as of yet.,  Discussed with pulmonary consultant.  Planning 

all tracheostomy on Monday if he is unable to be weaned off vent





4/1/2020


No acute events reported overnight, no fever or chills, remains critically 

stable, discussed with mother at bedside. 





[id number]


Patient seen and examined in the ICU


She is critically ill


Mechanically ventilated


Assist-control/25/450/30 with 8 of PEEP


She is on cefepime daptomycin Flagyl and micafungin GEN for antibiotic coverage


Also getting TPN and CRRT


Sedated with Versed


Getting IV albumin also


She is tachycardic at 112 bpm


Temp max 100.0


White blood count is down from 45,000 35,000 today


Chart reviewed


Discussed with RN











[id number]


Patient seen and examined in the ICU


She remains very critically ill


Going for a CT of the abdomen chest and pelvis


Ventilated : On assist control 40% FiO2


Discussed with RN


Chart reviewed








[id number]


Patient seen and examined in the ICU


She is still on CRRT although we plan to change to hemodialysis soon


Chart reviewed


Discussed with RN


Hemoglobin down to 6.9 (suspect CRRT related , Will let nephrology consider 

transfusion while on dialysis)








[id number]


Patient seen and examined in the ICU


She is mechanically ventilated


Before meals/25/450/30%


Also on CRRT


Very critically ill


Chart reviewed


Discussed with RN











[id number]


Patient seen and examined in the ICU


She is now been transferred to the Covid 19 unit so we can rule out Covid and 

keep her in isolation


Very critically ill


Intubated and  ventilated


Assist control 40%


Chart reviewed


Discussed with RN


Still on CRRT


Getting a chest x-ray now


Very concerned about her prognosis








[id number]


Patient seen and examined in the ICU


She is extremely critically ill


Remains mechanically ventilated with assist control/25/450/40%


Also on continuous renal replacement therapy


Chart reviewed


Discussed with RN


She has TPN hanging


Also on pressors











[id number]


Patient seen and examined in the ICU


She is still requiring CRRT


On the vent with assist control but her FiO2 is down to 40% from yesterday


Slightly better but still very critically ill


Discussed with RN


Chart reviewed








[id number]


Patient seen and examined in the ICU


She remains extremely critically ill


On IV fentanyl IV Versed IV Doxy


On the vent


Assist-control/25/450/70%


She is critically ill


Discussed with RN


Chart reviewed


Patient is currently on CRRT as well








[id number]


Patient seen and examined in the ICU


She had to be intubated this morning


On assist-control 25/450/100% with 10 of PEEP and only satting 87%


She is extremely critically ill


I'm not sure if she will survive


Chart reviewed


Discussed with RN











Ms Tadeo is a 50yo F w/ PMHx HTN, prediabetes who presents the emergency room

complaints of abdominal pain. Patient described off and on 3 days. She states is

constant, described as a squeezing sensation in a band-like distribution. + 

nausea, vomiting.  She denies any fever or diarrhea.  Patient denies any 

abdominal surgical procedures.  She states is worse with movements, car ride.  

Pain initially was upper abdomen however now pretty much generalized.  Last 

bowel movement was 3/15/2020. Nothing makes her pain better.  Patient denies any

shortness of breath.  She does state the pain moves into her chest.  Denies any 

headache or visual changes.


Lipase 07244, , , Bilirubin 1.4.


CT abdomen confirms pancreatic inflammation, peripancreatic fluid and 

inflammatory changes around the pancreas consistent with pancreatitis. 

Cholelithiasis and 1.4cm uterine fibroid as well as possible left salpingitis. 

Admitted for further care


GI, General surgery, ID, Pulm consulted.





3/17: Overnight per report no urine output. Added dilaudid for pain, PICC placed

per IR. Renal US negative.Seen bedside in ICU, given 2L additional NSS and 

albumin infusion. Still hypotensive, started on levophed. Repeat CT abdomen with

necrosis. Updated her fiancee


3/18: Sats are only 87% on nasal cannula oxygen. Dialysis catheter per 

nephrology


3/19: She is now on BiPAP appears more ill, now on dialysis


3/20: Seen on BiPAP. Her mother and another family member are present and seemed

to be good support for her. Currently on dialysis. Appears critically ill


3/21: Overnight Tmax 101.7 , still on BiPAP FiO2 40%, still on low dose Levophed

gtt, TPN initiated. On dialysis





Overnight still febrile. Dialysis today. She wakes up and responds to pain. No 

CP.





Vitals


Vitals





Vital Signs








  Date Time  Temp Pulse Resp B/P (MAP) Pulse Ox O2 Delivery O2 Flow Rate FiO2


 


4/3/20 18:00  84 25 103/62 (76) 100 Ventilator  


 


4/3/20 15:00 99.1       





 99.1       


 


4/3/20 05:12       6.0 











Physical Exam


Physical Exam


GENERAL: Orally intubated/sedated - generalizd anasarca 


HEENT: Mild icterus, pupils equal, ETT, NGT


NECK:  Supple. 


LUNGS:  Decreased breath sounds at the bases.


HEART:  S1, S2, regular 


ABDOMEN:  Distended, hypoactive BS, Rectal tube in place 


: [place]   


EXTREMITIES: Generalized edema, no cyanosis - some mottling, [place] boots 

bilaterally 


DERMATOLOGIC:  Warm and dry.  No generalized rash.  


CENTRAL NERVOUS SYSTEM: Sedated 


RIJ Temp HDC, RUE-PICC & LIJ - clean


General:  Other (sedated )


Heart:  Other (increased rate)


Lungs:  Other (decrease bs)


Abdomen:  Other (distended, softer)


Extremities:  No edema, Other (SOME CLUBBING )


Skin:  Other (mottling noted to extremities )





Labs


LABS





Laboratory Tests








Test


 4/3/20


05:51 4/3/20


06:00 4/3/20


13:11 4/3/20


15:30


 


Glucose (Fingerstick)


 209 mg/dL


(70-99) 


 159 mg/dL


(70-99) 





 


White Blood Count


 


 15.7 x10^3/uL


(4.0-11.0) 


 





 


Red Blood Count


 


 2.57 x10^6/uL


(3.50-5.40) 


 





 


Hemoglobin


 


 8.2 g/dL


(12.0-15.5) 


 





 


Hematocrit


 


 25.8 %


(36.0-47.0) 


 





 


Mean Corpuscular Volume


 


 101 fL


() 


 





 


Mean Corpuscular Hemoglobin  32 pg (25-35)   


 


Mean Corpuscular Hemoglobin


Concent 


 32 g/dL


(31-37) 


 





 


Red Cell Distribution Width


 


 18.9 %


(11.5-14.5) 


 





 


Platelet Count


 


 233 x10^3/uL


(140-400) 


 





 


Neutrophils (%) (Auto)  83 % (31-73)   


 


Lymphocytes (%) (Auto)  7 % (24-48)   


 


Monocytes (%) (Auto)  8 % (0-9)   


 


Eosinophils (%) (Auto)  2 % (0-3)   


 


Basophils (%) (Auto)  0 % (0-3)   


 


Neutrophils # (Auto)


 


 13.0 x10^3/uL


(1.8-7.7) 


 





 


Lymphocytes # (Auto)


 


 1.1 x10^3/uL


(1.0-4.8) 


 





 


Monocytes # (Auto)


 


 1.2 x10^3/uL


(0.0-1.1) 


 





 


Eosinophils # (Auto)


 


 0.4 x10^3/uL


(0.0-0.7) 


 





 


Basophils # (Auto)


 


 0.1 x10^3/uL


(0.0-0.2) 


 





 


Segmented Neutrophils %  58 % (35-66)   


 


Band Neutrophils %  19 % (0-9)   


 


Lymphocytes %  13 % (24-48)   


 


Monocytes %  4 % (0-10)   


 


Eosinophils %  4 % (0-5)   


 


Myelocytes %  2 % (0-0)   


 


Nucleated Red Blood Cells  2   


 


Platelet Estimate


 


 Adequate


(ADEQUATE) 


 





 


Giant Platelets  Occ   


 


Anisocytosis  Slight   


 


Sodium Level


 


 141 mmol/L


(136-145) 


 





 


Potassium Level


 


 3.8 mmol/L


(3.5-5.1) 


 





 


Chloride Level


 


 107 mmol/L


() 


 





 


Carbon Dioxide Level


 


 20 mmol/L


(21-32) 


 





 


Anion Gap  14 (6-14)   


 


Blood Urea Nitrogen


 


 63 mg/dL


(7-20) 


 





 


Creatinine


 


 2.0 mg/dL


(0.6-1.0) 


 





 


Estimated GFR


(Cockcroft-Gault) 


 26.5 


 


 





 


BUN/Creatinine Ratio  32 (6-20)   


 


Glucose Level


 


 212 mg/dL


(70-99) 


 





 


Calcium Level


 


 8.0 mg/dL


(8.5-10.1) 


 





 


Phosphorus Level


 


 4.7 mg/dL


(2.6-4.7) 


 





 


Magnesium Level


 


 2.1 mg/dL


(1.8-2.4) 


 





 


Total Bilirubin


 


 0.9 mg/dL


(0.2-1.0) 


 





 


Aspartate Amino Transf


(AST/SGOT) 


 46 U/L (15-37) 


 


 





 


Alanine Aminotransferase


(ALT/SGPT) 


 20 U/L (14-59) 


 


 





 


Alkaline Phosphatase


 


 95 U/L


() 


 





 


Total Protein


 


 4.1 g/dL


(6.4-8.2) 


 





 


Albumin


 


 1.8 g/dL


(3.4-5.0) 


 





 


Albumin/Globulin Ratio  0.8 (1.0-1.7)   


 


Lipase


 


 823 U/L


() 


 





 


O2 Saturation    97 % (92-99) 


 


Arterial Blood pH


 


 


 


 7.55


(7.35-7.45)


 


Arterial Blood pCO2 at


Patient Temp 


 


 


 27 mmHg


(35-46)


 


Arterial Blood pO2 at Patient


Temp 


 


 


 99 mmHg


()


 


Arterial Blood HCO3


 


 


 


 23 mmol/L


(21-28)


 


Arterial Blood Base Excess


 


 


 


 1 mmol/L


(-3-3)


 


FiO2    40 


 


Test


 4/3/20


16:57 


 


 





 


Glucose (Fingerstick)


 185 mg/dL


(70-99) 


 


 














Assessment and Plan


Assessmemt and Plan


Problems


Medical Problems:


(1) Acute pancreatitis


Status: Acute  





(2) Cholelithiasis


Status: Acute  











Comment


Review of Relevant


I have reviewed the following items josy (where applicable) has been applied.


Labs





Laboratory Tests








Test


 4/2/20


00:01 4/2/20


05:30 4/2/20


05:48 4/2/20


08:00


 


Sodium Level


 138 mmol/L


(136-145) 138 mmol/L


(136-145) 


 





 


Potassium Level


 3.8 mmol/L


(3.5-5.1) 3.7 mmol/L


(3.5-5.1) 


 





 


Chloride Level


 105 mmol/L


() 105 mmol/L


() 


 





 


Carbon Dioxide Level


 27 mmol/L


(21-32) 25 mmol/L


(21-32) 


 





 


Anion Gap 6 (6-14)  8 (6-14)   


 


Blood Urea Nitrogen


 33 mg/dL


(7-20) 38 mg/dL


(7-20) 


 





 


Creatinine


 1.2 mg/dL


(0.6-1.0) 1.3 mg/dL


(0.6-1.0) 


 





 


Estimated GFR


(Cockcroft-Gault) 47.7 


 43.5 


 


 





 


Glucose Level


 173 mg/dL


(70-99) 180 mg/dL


(70-99) 


 





 


Calcium Level


 8.0 mg/dL


(8.5-10.1) 8.0 mg/dL


(8.5-10.1) 


 





 


Phosphorus Level


 3.2 mg/dL


(2.6-4.7) 3.1 mg/dL


(2.6-4.7) 


 





 


Magnesium Level


 2.0 mg/dL


(1.8-2.4) 2.0 mg/dL


(1.8-2.4) 


 





 


Glucose (Fingerstick)


 


 


 168 mg/dL


(70-99) 





 


O2 Saturation    91 % (92-99) 


 


Arterial Blood pH


 


 


 


 7.43


(7.35-7.45)


 


Arterial Blood pCO2 at


Patient Temp 


 


 


 31 mmHg


(35-46)


 


Arterial Blood pO2 at Patient


Temp 


 


 


 65 mmHg


()


 


Arterial Blood HCO3


 


 


 


 20 mmol/L


(21-28)


 


Arterial Blood Base Excess


 


 


 


 -3 mmol/L


(-3-3)


 


FiO2    30 


 


Test


 4/2/20


17:16 4/3/20


05:51 4/3/20


06:00 4/3/20


13:11


 


Glucose (Fingerstick)


 211 mg/dL


(70-99) 209 mg/dL


(70-99) 


 159 mg/dL


(70-99)


 


White Blood Count


 


 


 15.7 x10^3/uL


(4.0-11.0) 





 


Red Blood Count


 


 


 2.57 x10^6/uL


(3.50-5.40) 





 


Hemoglobin


 


 


 8.2 g/dL


(12.0-15.5) 





 


Hematocrit


 


 


 25.8 %


(36.0-47.0) 





 


Mean Corpuscular Volume


 


 


 101 fL


() 





 


Mean Corpuscular Hemoglobin   32 pg (25-35)  


 


Mean Corpuscular Hemoglobin


Concent 


 


 32 g/dL


(31-37) 





 


Red Cell Distribution Width


 


 


 18.9 %


(11.5-14.5) 





 


Platelet Count


 


 


 233 x10^3/uL


(140-400) 





 


Neutrophils (%) (Auto)   83 % (31-73)  


 


Lymphocytes (%) (Auto)   7 % (24-48)  


 


Monocytes (%) (Auto)   8 % (0-9)  


 


Eosinophils (%) (Auto)   2 % (0-3)  


 


Basophils (%) (Auto)   0 % (0-3)  


 


Neutrophils # (Auto)


 


 


 13.0 x10^3/uL


(1.8-7.7) 





 


Lymphocytes # (Auto)


 


 


 1.1 x10^3/uL


(1.0-4.8) 





 


Monocytes # (Auto)


 


 


 1.2 x10^3/uL


(0.0-1.1) 





 


Eosinophils # (Auto)


 


 


 0.4 x10^3/uL


(0.0-0.7) 





 


Basophils # (Auto)


 


 


 0.1 x10^3/uL


(0.0-0.2) 





 


Segmented Neutrophils %   58 % (35-66)  


 


Band Neutrophils %   19 % (0-9)  


 


Lymphocytes %   13 % (24-48)  


 


Monocytes %   4 % (0-10)  


 


Eosinophils %   4 % (0-5)  


 


Myelocytes %   2 % (0-0)  


 


Nucleated Red Blood Cells   2  


 


Platelet Estimate


 


 


 Adequate


(ADEQUATE) 





 


Giant Platelets   Occ  


 


Anisocytosis   Slight  


 


Sodium Level


 


 


 141 mmol/L


(136-145) 





 


Potassium Level


 


 


 3.8 mmol/L


(3.5-5.1) 





 


Chloride Level


 


 


 107 mmol/L


() 





 


Carbon Dioxide Level


 


 


 20 mmol/L


(21-32) 





 


Anion Gap   14 (6-14)  


 


Blood Urea Nitrogen


 


 


 63 mg/dL


(7-20) 





 


Creatinine


 


 


 2.0 mg/dL


(0.6-1.0) 





 


Estimated GFR


(Cockcroft-Gault) 


 


 26.5 


 





 


BUN/Creatinine Ratio   32 (6-20)  


 


Glucose Level


 


 


 212 mg/dL


(70-99) 





 


Calcium Level


 


 


 8.0 mg/dL


(8.5-10.1) 





 


Phosphorus Level


 


 


 4.7 mg/dL


(2.6-4.7) 





 


Magnesium Level


 


 


 2.1 mg/dL


(1.8-2.4) 





 


Total Bilirubin


 


 


 0.9 mg/dL


(0.2-1.0) 





 


Aspartate Amino Transf


(AST/SGOT) 


 


 46 U/L (15-37) 


 





 


Alanine Aminotransferase


(ALT/SGPT) 


 


 20 U/L (14-59) 


 





 


Alkaline Phosphatase


 


 


 95 U/L


() 





 


Total Protein


 


 


 4.1 g/dL


(6.4-8.2) 





 


Albumin


 


 


 1.8 g/dL


(3.4-5.0) 





 


Albumin/Globulin Ratio   0.8 (1.0-1.7)  


 


Lipase


 


 


 823 U/L


() 





 


Test


 4/3/20


15:30 4/3/20


16:57 


 





 


O2 Saturation 97 % (92-99)    


 


Arterial Blood pH


 7.55


(7.35-7.45) 


 


 





 


Arterial Blood pCO2 at


Patient Temp 27 mmHg


(35-46) 


 


 





 


Arterial Blood pO2 at Patient


Temp 99 mmHg


() 


 


 





 


Arterial Blood HCO3


 23 mmol/L


(21-28) 


 


 





 


Arterial Blood Base Excess


 1 mmol/L


(-3-3) 


 


 





 


FiO2 40    


 


Glucose (Fingerstick)


 


 185 mg/dL


(70-99) 


 











Laboratory Tests








Test


 4/3/20


05:51 4/3/20


06:00 4/3/20


13:11 4/3/20


15:30


 


Glucose (Fingerstick)


 209 mg/dL


(70-99) 


 159 mg/dL


(70-99) 





 


White Blood Count


 


 15.7 x10^3/uL


(4.0-11.0) 


 





 


Red Blood Count


 


 2.57 x10^6/uL


(3.50-5.40) 


 





 


Hemoglobin


 


 8.2 g/dL


(12.0-15.5) 


 





 


Hematocrit


 


 25.8 %


(36.0-47.0) 


 





 


Mean Corpuscular Volume


 


 101 fL


() 


 





 


Mean Corpuscular Hemoglobin  32 pg (25-35)   


 


Mean Corpuscular Hemoglobin


Concent 


 32 g/dL


(31-37) 


 





 


Red Cell Distribution Width


 


 18.9 %


(11.5-14.5) 


 





 


Platelet Count


 


 233 x10^3/uL


(140-400) 


 





 


Neutrophils (%) (Auto)  83 % (31-73)   


 


Lymphocytes (%) (Auto)  7 % (24-48)   


 


Monocytes (%) (Auto)  8 % (0-9)   


 


Eosinophils (%) (Auto)  2 % (0-3)   


 


Basophils (%) (Auto)  0 % (0-3)   


 


Neutrophils # (Auto)


 


 13.0 x10^3/uL


(1.8-7.7) 


 





 


Lymphocytes # (Auto)


 


 1.1 x10^3/uL


(1.0-4.8) 


 





 


Monocytes # (Auto)


 


 1.2 x10^3/uL


(0.0-1.1) 


 





 


Eosinophils # (Auto)


 


 0.4 x10^3/uL


(0.0-0.7) 


 





 


Basophils # (Auto)


 


 0.1 x10^3/uL


(0.0-0.2) 


 





 


Segmented Neutrophils %  58 % (35-66)   


 


Band Neutrophils %  19 % (0-9)   


 


Lymphocytes %  13 % (24-48)   


 


Monocytes %  4 % (0-10)   


 


Eosinophils %  4 % (0-5)   


 


Myelocytes %  2 % (0-0)   


 


Nucleated Red Blood Cells  2   


 


Platelet Estimate


 


 Adequate


(ADEQUATE) 


 





 


Giant Platelets  Occ   


 


Anisocytosis  Slight   


 


Sodium Level


 


 141 mmol/L


(136-145) 


 





 


Potassium Level


 


 3.8 mmol/L


(3.5-5.1) 


 





 


Chloride Level


 


 107 mmol/L


() 


 





 


Carbon Dioxide Level


 


 20 mmol/L


(21-32) 


 





 


Anion Gap  14 (6-14)   


 


Blood Urea Nitrogen


 


 63 mg/dL


(7-20) 


 





 


Creatinine


 


 2.0 mg/dL


(0.6-1.0) 


 





 


Estimated GFR


(Cockcroft-Gault) 


 26.5 


 


 





 


BUN/Creatinine Ratio  32 (6-20)   


 


Glucose Level


 


 212 mg/dL


(70-99) 


 





 


Calcium Level


 


 8.0 mg/dL


(8.5-10.1) 


 





 


Phosphorus Level


 


 4.7 mg/dL


(2.6-4.7) 


 





 


Magnesium Level


 


 2.1 mg/dL


(1.8-2.4) 


 





 


Total Bilirubin


 


 0.9 mg/dL


(0.2-1.0) 


 





 


Aspartate Amino Transf


(AST/SGOT) 


 46 U/L (15-37) 


 


 





 


Alanine Aminotransferase


(ALT/SGPT) 


 20 U/L (14-59) 


 


 





 


Alkaline Phosphatase


 


 95 U/L


() 


 





 


Total Protein


 


 4.1 g/dL


(6.4-8.2) 


 





 


Albumin


 


 1.8 g/dL


(3.4-5.0) 


 





 


Albumin/Globulin Ratio  0.8 (1.0-1.7)   


 


Lipase


 


 823 U/L


() 


 





 


O2 Saturation    97 % (92-99) 


 


Arterial Blood pH


 


 


 


 7.55


(7.35-7.45)


 


Arterial Blood pCO2 at


Patient Temp 


 


 


 27 mmHg


(35-46)


 


Arterial Blood pO2 at Patient


Temp 


 


 


 99 mmHg


()


 


Arterial Blood HCO3


 


 


 


 23 mmol/L


(21-28)


 


Arterial Blood Base Excess


 


 


 


 1 mmol/L


(-3-3)


 


FiO2    40 


 


Test


 4/3/20


16:57 


 


 





 


Glucose (Fingerstick)


 185 mg/dL


(70-99) 


 


 











Microbiology


3/24/20 Blood Culture - Final, Complete


          NO GROWTH AFTER 5 DAYS


Medications





Current Medications


Sodium Chloride 1,000 ml @  1,000 mls/hr Q1H IV  Last administered on 3/16/20at 

03:00;  Start 3/16/20 at 03:00;  Stop 3/16/20 at 03:59;  Status DC


Ondansetron HCl (Zofran) 4 mg 1X  ONCE IVP  Last administered on 3/16/20at 

03:27;  Start 3/16/20 at 03:00;  Stop 3/16/20 at 03:01;  Status DC


Morphine Sulfate (Morphine Sulfate) 4 mg 1X  ONCE IV ;  Start 3/16/20 at 03:00; 

Stop 3/16/20 at 03:01;  Status Cancel


Ketorolac Tromethamine (Toradol 30mg Vial) 30 mg 1X  ONCE IV  Last administered 

on 3/16/20at 02:54;  Start 3/16/20 at 03:00;  Stop 3/16/20 at 03:01;  Status DC


Fentanyl Citrate (Fentanyl 2ml Vial) 25 mcg 1X  ONCE IVP  Last administered on 

3/16/20at 03:23;  Start 3/16/20 at 03:30;  Stop 3/16/20 at 03:31;  Status DC


Fentanyl Citrate (Fentanyl 2ml Vial) 100 mcg STK-MED ONCE .ROUTE ;  Start 

3/16/20 at 03:18;  Stop 3/16/20 at 03:18;  Status DC


Iohexol (Omnipaque 350 Mg/ml) 90 ml 1X  ONCE IV  Last administered on 3/16/20at 

03:25;  Start 3/16/20 at 03:30;  Stop 3/16/20 at 03:31;  Status DC


Info (CONTRAST GIVEN -- Rx MONITORING) 1 each PRN DAILY  PRN MC SEE COMMENTS;  

Start 3/16/20 at 03:30;  Stop 3/18/20 at 03:29;  Status DC


Hydromorphone HCl (Dilaudid) 0.5 mg 1X  ONCE IV  Last administered on 3/16/20at 

03:55;  Start 3/16/20 at 04:30;  Stop 3/16/20 at 04:32;  Status DC


Ondansetron HCl (Zofran) 4 mg PRN Q8HRS  PRN IV NAUSEA/VOMITING 1ST CHOICE;  

Start 3/16/20 at 05:00;  Stop 3/16/20 at 09:27;  Status DC


Morphine Sulfate (Morphine Sulfate) 2 mg PRN Q2HR  PRN IV SEVERE PAIN 7-10 Last 

administered on 3/17/20at 12:26;  Start 3/16/20 at 05:00;  Stop 3/17/20 at 

14:15;  Status DC


Sodium Chloride 1,000 ml @  125 mls/hr Q8H IV  Last administered on 3/16/20at 

20:56;  Start 3/16/20 at 05:00;  Stop 3/17/20 at 04:59;  Status DC


Hydromorphone HCl (Dilaudid) 0.5 mg PRN Q3HRS  PRN IV SEVERE PAIN 7-10 Last 

administered on 3/17/20at 10:06;  Start 3/16/20 at 05:00;  Stop 3/17/20 at 

12:01;  Status DC


Piperacillin Sod/ Tazobactam Sod 4.5 gm/Sodium Chloride 100 ml @  200 mls/hr 1X 

ONCE IV  Last administered on 3/16/20at 05:44;  Start 3/16/20 at 06:00;  Stop 

3/16/20 at 06:29;  Status DC


Ondansetron HCl (Zofran) 4 mg PRN Q4HRS  PRN IV NAUSEA/VOMITING 1ST CHOICE Last 

administered on 3/21/20at 16:15;  Start 3/16/20 at 09:30


Insulin Human Lispro (HumaLOG) 0-9 UNITS Q6HRS SQ  Last administered on 4/3/20at

17:04;  Start 3/16/20 at 09:30


Dextrose (Dextrose 50%-Water Syringe) 12.5 gm PRN Q15MIN  PRN IV SEE COMMENTS;  

Start 3/16/20 at 09:30


Pantoprazole Sodium (PROTONIX VIAL for IV PUSH) 40 mg DAILYAC IVP  Last 

administered on 4/3/20at 07:49;  Start 3/16/20 at 11:30


Prochlorperazine Edisylate (Compazine) 10 mg PRN Q6HRS  PRN IV NAUSEA/VOMITING, 

2nd CHOICE Last administered on 3/17/20at 00:42;  Start 3/16/20 at 17:45


Atenolol (Tenormin) 100 mg DAILY PO ;  Start 3/17/20 at 09:00;  Stop 3/16/20 at 

20:08;  Status DC


Metoprolol Tartrate (Lopressor Vial) 2.5 mg Q6HRS IVP  Last administered on 

3/17/20at 05:51;  Start 3/16/20 at 20:15;  Stop 3/17/20 at 10:02;  Status DC


Metoprolol Tartrate (Lopressor Vial) 5 mg Q6HRS IVP  Last administered on 

3/26/20at 00:12;  Start 3/17/20 at 10:15;  Stop 3/28/20 at 08:48;  Status DC


Hydromorphone HCl (Dilaudid) 1 mg PRN Q3HRS  PRN IV SEVERE PAIN 7-10 Last 

administered on 3/23/20at 05:13;  Start 3/17/20 at 12:00;  Stop 3/31/20 at 

00:25;  Status DC


Lidocaine HCl (Buffered Lidocaine 1%) 3 ml STK-MED ONCE .ROUTE ;  Start 3/17/20 

at 12:55;  Stop 3/17/20 at 12:56;  Status DC


Albumin Human 500 ml @  125 mls/hr 1X  ONCE IV  Last administered on 3/17/20at 

14:33;  Start 3/17/20 at 14:30;  Stop 3/17/20 at 18:32;  Status DC


Norepinephrine Bitartrate 8 mg/ Dextrose 258 ml @  17.299 mls/ hr CONT  PRN IV 

PER PROTOCOL Last administered on 4/3/20at 07:51;  Start 3/17/20 at 15:30


Sodium Chloride 1,000 ml @  125 mls/hr Q8H IV  Last administered on 3/17/20at 

21:04;  Start 3/17/20 at 16:00;  Stop 3/18/20 at 02:42;  Status DC


Albumin Human 500 ml @  125 mls/hr PRN BID  PRN IV After every 2L NSS & BP < 

90mm Last administered on 4/1/20at 14:21;  Start 3/17/20 at 16:00


Iohexol (Omnipaque 300 Mg/ml) 60 ml 1X  ONCE IV  Last administered on 3/17/20at 

17:20;  Start 3/17/20 at 17:00;  Stop 3/17/20 at 17:01;  Status DC


Info (CONTRAST GIVEN -- Rx MONITORING) 1 each PRN DAILY  PRN MC SEE COMMENTS;  

Start 3/17/20 at 17:00;  Stop 3/19/20 at 16:59;  Status DC


Meropenem 1 gm/ Sodium Chloride 100 ml @  200 mls/hr Q8HRS IV  Last administered

on 3/18/20at 05:45;  Start 3/17/20 at 20:00;  Stop 3/18/20 at 08:48;  Status DC


Furosemide (Lasix) 40 mg 1X  ONCE IVP  Last administered on 3/17/20at 22:12;  

Start 3/17/20 at 22:30;  Stop 3/17/20 at 22:31;  Status DC


Calcium Chloride 1000 mg/Sodium Chloride 110 ml @  220 mls/hr 1X  ONCE IV  Last 

administered on 3/17/20at 22:11;  Start 3/17/20 at 22:30;  Stop 3/17/20 at 

22:59;  Status DC


Albuterol Sulfate (Ventolin Neb Soln) 2.5 mg 1X  ONCE NEB  Last administered on 

3/18/20at 00:56;  Start 3/17/20 at 22:30;  Stop 3/17/20 at 22:31;  Status DC


Insulin Human Regular (HumuLIN R VIAL) 5 unit 1X  ONCE IV  Last administered on 

3/17/20at 22:14;  Start 3/17/20 at 22:30;  Stop 3/17/20 at 22:31;  Status DC


Magnesium Sulfate 50 ml @ 25 mls/hr 1X  ONCE IV  Last administered on 3/18/20at 

02:57;  Start 3/18/20 at 03:00;  Stop 3/18/20 at 04:59;  Status DC


Calcium Gluconate 1000 mg/Sodium Chloride 110 ml @  220 mls/hr 1X  ONCE IV  Last

administered on 3/18/20at 02:46;  Start 3/18/20 at 03:00;  Stop 3/18/20 at 

03:29;  Status DC


Sodium Chloride 1,000 ml @  200 mls/hr Q5H IV  Last administered on 3/18/20at 

02:46;  Start 3/18/20 at 03:00;  Stop 3/18/20 at 10:21;  Status DC


Calcium Gluconate 1000 mg/Sodium Chloride 110 ml @  220 mls/hr 1X  ONCE IV  Last

administered on 3/18/20at 03:21;  Start 3/18/20 at 03:30;  Stop 3/18/20 at 0

3:59;  Status DC


Sodium Bicarbonate 50 meq/Sodium Chloride 1,050 ml @  75 mls/hr Q14H IV  Last 

administered on 3/22/20at 21:10;  Start 3/18/20 at 07:30;  Stop 3/23/20 at 

10:28;  Status DC


Calcium Gluconate 2000 mg/Sodium Chloride 120 ml @  220 mls/hr 1X  ONCE IV  Last

administered on 3/18/20at 09:05;  Start 3/18/20 at 07:30;  Stop 3/18/20 at 

08:02;  Status DC


Lidocaine HCl (Xylocaine-Mpf 1% 2ml Vial) 2 ml STK-MED ONCE .ROUTE ;  Start 

3/18/20 at 08:47;  Stop 3/18/20 at 08:47;  Status DC


Meropenem 500 mg/ Sodium Chloride 50 ml @  100 mls/hr Q12HR IV  Last 

administered on 3/23/20at 21:01;  Start 3/18/20 at 18:00;  Stop 3/24/20 at 07:58

;  Status DC


Lidocaine HCl (Buffered Lidocaine 1%) 3 ml STK-MED ONCE .ROUTE ;  Start 3/18/20 

at 09:46;  Stop 3/18/20 at 09:46;  Status DC


Lidocaine HCl (Buffered Lidocaine 1%) 6 ml 1X  ONCE INJ  Last administered on 

3/18/20at 10:26;  Start 3/18/20 at 10:15;  Stop 3/18/20 at 10:16;  Status DC


Info (Tpn Per Pharmacy) 1 each PRN DAILY  PRN MC SEE COMMENTS Last administered 

on 4/3/20at 13:51;  Start 3/18/20 at 12:00


Sodium Chloride 1,000 ml @  1,000 mls/hr Q1H PRN IV hypotension;  Start 3/18/20 

at 12:07;  Stop 3/18/20 at 18:06;  Status DC


Diphenhydramine HCl (Benadryl) 25 mg 1X PRN  PRN IV ITCHING;  Start 3/18/20 at 

12:15;  Stop 3/19/20 at 12:14;  Status DC


Diphenhydramine HCl (Benadryl) 25 mg 1X PRN  PRN IV ITCHING;  Start 3/18/20 at 

12:15;  Stop 3/19/20 at 12:14;  Status DC


Sodium Chloride 1,000 ml @  400 mls/hr Q2H30M PRN IV PATENCY;  Start 3/18/20 at 

12:07;  Stop 3/19/20 at 00:06;  Status DC


Info (PHARMACY MONITORING -- do not chart) 1 each PRN DAILY  PRN MC SEE 

COMMENTS;  Start 3/18/20 at 12:15;  Stop 3/20/20 at 08:13;  Status DC


Sodium Chloride 90 meq/Calcium Gluconate 10 meq/ Multivitamins 10 ml/Chromium/ 

Copper/Manganese/ Seleni/Zn 1 ml/ Total Parenteral Nutrition/Amino 

Acids/Dextrose/ Fat Emulsion Intravenous 55.005 ml  @ 2.292 mls/hr TPN  CONT IV 

;  Start 3/18/20 at 22:00;  Stop 3/18/20 at 12:33;  Status DC


Info (Tpn Per Pharmacy) 1 each PRN DAILY  PRN MC SEE COMMENTS;  Start 3/18/20 at

12:30;  Status UNV


Sodium Chloride 90 meq/Calcium Gluconate 10 meq/ Multivitamins 10 ml/Chromium/ 

Copper/Manganese/ Seleni/Zn 0.5 ml/ Total Parenteral Nutrition/Amino 

Acids/Dextrose/ Fat Emulsion Intravenous 1,512 ml @  63 mls/hr TPN  CONT IV  

Last administered on 3/18/20at 22:06;  Start 3/18/20 at 22:00;  Stop 3/19/20 at 

21:59;  Status DC


Calcium Carbonate/ Glycine (Tums) 500 mg PRN AFTMEALHC  PRN PO INDIGESTION;  

Start 3/18/20 at 17:45


Calcium Gluconate (Calcium Gluconate) 2,000 mg 1X  ONCE IVP  Last administered 

on 3/19/20at 02:19;  Start 3/19/20 at 02:15;  Stop 3/19/20 at 02:16;  Status DC


Calcium Chloride 3000 mg/Sodium Chloride 1,030 ml @  50 mls/hr [id number] IV  Last 

administered on 3/21/20at 02:17;  Start 3/19/20 at 08:00;  Stop 3/21/20 at 15:2

3;  Status DC


Lorazepam (Ativan Inj) 1 mg PRN Q4HRS  PRN IVP ANXIETY / AGITATION Last 

administered on 3/23/20at 00:34;  Start 3/19/20 at 09:00


Sodium Chloride 1,000 ml @  1,000 mls/hr Q1H PRN IV hypotension;  Start 3/19/20 

at 08:56;  Stop 3/19/20 at 14:55;  Status DC


Albumin Human 200 ml @  200 mls/hr 1X PRN  PRN IV Hypotension;  Start 3/19/20 at

09:00;  Stop 3/19/20 at 14:59;  Status DC


Diphenhydramine HCl (Benadryl) 25 mg 1X PRN  PRN IV ITCHING;  Start 3/19/20 at 

09:00;  Stop 3/20/20 at 08:59;  Status DC


Diphenhydramine HCl (Benadryl) 25 mg 1X PRN  PRN IV ITCHING;  Start 3/19/20 at 

09:00;  Stop 3/20/20 at 08:59;  Status DC


Sodium Chloride 1,000 ml @  400 mls/hr Q2H30M PRN IV PATENCY;  Start 3/19/20 at 

08:56;  Stop 3/19/20 at 20:55;  Status DC


Info (PHARMACY MONITORING -- do not chart) 1 each PRN DAILY  PRN MC SEE 

COMMENTS;  Start 3/19/20 at 09:00;  Status UNV


Info (PHARMACY MONITORING -- do not chart) 1 each PRN DAILY  PRN MC SEE 

COMMENTS;  Start 3/19/20 at 09:00;  Stop 3/20/20 at 08:13;  Status DC


Digoxin (Lanoxin) 500 mcg 1X  ONCE IV  Last administered on 3/19/20at 10:04;  

Start 3/19/20 at 10:00;  Stop 3/19/20 at 10:01;  Status DC


Digoxin (Lanoxin) 125 mcg 1X  ONCE IV  Last administered on 3/19/20at 17:10;  

Start 3/19/20 at 18:00;  Stop 3/19/20 at 18:01;  Status DC


Magnesium Sulfate 100 ml @  25 mls/hr 1X  ONCE IV  Last administered on 

3/19/20at 12:48;  Start 3/19/20 at 13:00;  Stop 3/19/20 at 16:59;  Status DC


Sodium Chloride 90 meq/Magnesium Sulfate 10 meq/ Calcium Gluconate 20 meq/ 

Multivitamins 10 ml/Chromium/ Copper/Manganese/ Seleni/Zn 0.5 ml/ Total 

Parenteral Nutrition/Amino Acids/Dextrose/ Fat Emulsion Intravenous 1,512 ml @  

63 mls/hr TPN  CONT IV  Last administered on 3/19/20at 22:25;  Start 3/19/20 at 

22:00;  Stop 3/20/20 at 21:59;  Status DC


Sodium Chloride 1,000 ml @  1,000 mls/hr Q1H PRN IV hypotension;  Start 3/20/20 

at 08:05;  Stop 3/20/20 at 14:04;  Status DC


Albumin Human 200 ml @  200 mls/hr 1X  ONCE IV  Last administered on 3/20/20at 

08:57;  Start 3/20/20 at 08:15;  Stop 3/20/20 at 09:14;  Status DC


Diphenhydramine HCl (Benadryl) 25 mg 1X PRN  PRN IV ITCHING;  Start 3/20/20 at 

08:15;  Stop 3/21/20 at 08:14;  Status DC


Diphenhydramine HCl (Benadryl) 25 mg 1X PRN  PRN IV ITCHING;  Start 3/20/20 at 

08:15;  Stop 3/21/20 at 08:14;  Status DC


Sodium Chloride 1,000 ml @  400 mls/hr Q2H30M PRN IV PATENCY;  Start 3/20/20 at 

08:05;  Stop 3/20/20 at 20:04;  Status DC


Info (PHARMACY MONITORING -- do not chart) 1 each PRN DAILY  PRN MC SEE 

COMMENTS;  Start 3/20/20 at 08:15;  Stop 3/24/20 at 07:57;  Status DC


Sodium Chloride 90 meq/Potassium Chloride 15 meq/ Potassium Phosphate 10 mmol/ 

Magnesium Sulfate 10 meq/Calcium Gluconate 20 meq/ Multivitamins 10 ml/Chromium/

Copper/Manganese/ Seleni/Zn 0.5 ml/ Total Parenteral Nutrition/Amino 

Acids/Dextrose/ Fat Emulsion Intravenous 1,512 ml @  63 mls/hr TPN  CONT IV  

Last administered on 3/20/20at 21:01;  Start 3/20/20 at 22:00;  Stop 3/21/20 at 

21:59;  Status DC


Potassium Chloride/Water 100 ml @  100 mls/hr 1X  ONCE IV  Last administered on 

3/20/20at 14:09;  Start 3/20/20 at 14:00;  Stop 3/20/20 at 14:59;  Status DC


Benzocaine (Hurricaine One) 1 spray 1X  ONCE MM  Last administered on 3/20/20at 

16:38;  Start 3/20/20 at 14:30;  Stop 3/20/20 at 14:31;  Status DC


Lidocaine HCl (Glydo (Lidocaine) Jelly) 1 thomas 1X  ONCE MM  Last administered on 

3/20/20at 16:38;  Start 3/20/20 at 14:30;  Stop 3/20/20 at 14:31;  Status DC


Linezolid/Dextrose 300 ml @  300 mls/hr Q12HR IV  Last administered on 3/26/20at

21:04;  Start 3/20/20 at 20:00;  Stop 3/27/20 at 07:50;  Status DC


Acetaminophen (Tylenol) 650 mg PRN Q6HRS  PRN PO MILD PAIN / TEMP;  Start 

3/21/20 at 03:30;  Stop 3/21/20 at 03:36;  Status DC


Acetaminophen (Tylenol) 650 mg PRN Q6HRS  PRN PEG MILD PAIN / TEMP Last 

administered on 3/26/20at 07:35;  Start 3/21/20 at 03:36


Sodium Chloride 1,000 ml @  1,000 mls/hr Q1H PRN IV hypotension;  Start 3/21/20 

at 07:50;  Stop 3/21/20 at 13:49;  Status DC


Albumin Human 200 ml @  200 mls/hr 1X PRN  PRN IV Hypotension;  Start 3/21/20 at

08:00;  Stop 3/21/20 at 13:59;  Status DC


Sodium Chloride (Normal Saline Flush) 10 ml 1X PRN  PRN IV AP catheter pack;  

Start 3/21/20 at 08:00;  Stop 3/22/20 at 07:59;  Status DC


Sodium Chloride (Normal Saline Flush) 10 ml 1X PRN  PRN IV  catheter pack;  

Start 3/21/20 at 08:00;  Stop 3/22/20 at 07:59;  Status DC


Sodium Chloride 1,000 ml @  400 mls/hr Q2H30M PRN IV PATENCY;  Start 3/21/20 at 

07:50;  Stop 3/21/20 at 19:49;  Status DC


Info (PHARMACY MONITORING -- do not chart) 1 each PRN DAILY  PRN MC SEE 

COMMENTS;  Start 3/21/20 at 08:00;  Status UNV


Info (PHARMACY MONITORING -- do not chart) 1 each PRN DAILY  PRN MC SEE 

COMMENTS;  Start 3/21/20 at 08:00;  Stop 3/23/20 at 08:25;  Status DC


Sodium Chloride 90 meq/Potassium Chloride 15 meq/ Potassium Phosphate 10 mmol/ 

Magnesium Sulfate 10 meq/Calcium Gluconate 20 meq/ Multivitamins 10 ml/Chromium/

Copper/Manganese/ Seleni/Zn 0.5 ml/ Total Parenteral Nutrition/Amino 

Acids/Dextrose/ Fat Emulsion Intravenous 1,512 ml @  63 mls/hr TPN  CONT IV  

Last administered on 3/21/20at 20:57;  Start 3/21/20 at 22:00;  Stop 3/22/20 at 

21:59;  Status DC


Sodium Chloride 90 meq/Potassium Chloride 15 meq/ Potassium Phosphate 15 mmol/ 

Magnesium Sulfate 10 meq/Calcium Gluconate 20 meq/ Multivitamins 10 ml/Chromium/

Copper/Manganese/ Seleni/Zn 0.5 ml/ Total Parenteral Nutrition/Amino Acid

s/Dextrose/ Fat Emulsion Intravenous 1,512 ml @  63 mls/hr TPN  CONT IV ;  Start

3/22/20 at 22:00;  Stop 3/22/20 at 14:16;  Status DC


Sodium Chloride 90 meq/Potassium Chloride 15 meq/ Potassium Phosphate 15 mmol/ 

Magnesium Sulfate 10 meq/Calcium Gluconate 20 meq/ Multivitamins 10 ml/Chromium/

Copper/Manganese/ Seleni/Zn 0.5 ml/ Total Parenteral Nutrition/Amino 

Acids/Dextrose/ Fat Emulsion Intravenous 1,200 ml @  50 mls/hr TPN  CONT IV ;  

Start 3/22/20 at 22:00;  Stop 3/22/20 at 14:17;  Status DC


Sodium Chloride 90 meq/Potassium Chloride 15 meq/ Potassium Phosphate 10 mmol/ 

Magnesium Sulfate 10 meq/Calcium Gluconate 20 meq/ Multivitamins 10 ml/Chromium/

Copper/Manganese/ Seleni/Zn 0.5 ml/ Total Parenteral Nutrition/Amino 

Acids/Dextrose/ Fat Emulsion Intravenous 1,200 ml @  50 mls/hr TPN  CONT IV  

Last administered on 3/22/20at 23:29;  Start 3/22/20 at 22:00;  Stop 3/23/20 at 

21:59;  Status DC


Sodium Chloride 1,000 ml @  1,000 mls/hr Q1H PRN IV hypotension;  Start 3/23/20 

at 07:28;  Stop 3/23/20 at 13:27;  Status DC


Albumin Human 200 ml @  200 mls/hr 1X  ONCE IV  Last administered on 3/23/20at 

08:51;  Start 3/23/20 at 07:30;  Stop 3/23/20 at 08:29;  Status DC


Diphenhydramine HCl (Benadryl) 25 mg 1X PRN  PRN IV ITCHING;  Start 3/23/20 at 

07:30;  Stop 3/24/20 at 07:29;  Status DC


Diphenhydramine HCl (Benadryl) 25 mg 1X PRN  PRN IV ITCHING;  Start 3/23/20 at 

07:30;  Stop 3/24/20 at 07:29;  Status DC


Sodium Chloride 1,000 ml @  400 mls/hr Q2H30M PRN IV PATENCY;  Start 3/23/20 at 

07:28;  Stop 3/23/20 at 19:27;  Status DC


Info (PHARMACY MONITORING -- do not chart) 1 each PRN DAILY  PRN MC SEE 

COMMENTS;  Start 3/23/20 at 07:30;  Stop 4/3/20 at 13:01;  Status DC


Metronidazole 100 ml @  100 mls/hr Q6HRS IV  Last administered on 4/3/20at 

17:02;  Start 3/23/20 at 08:30


Micafungin Sodium 100 mg/Dextrose 100 ml @  100 mls/hr Q24H IV  Last 

administered on 4/3/20at 12:43;  Start 3/23/20 at 09:00


Propofol 0 ml @ As Directed STK-MED ONCE IV ;  Start 3/23/20 at 07:53;  Stop 

3/23/20 at 07:53;  Status DC


Etomidate (Amidate) 20 mg STK-MED ONCE IV ;  Start 3/23/20 at 07:53;  Stop 

3/23/20 at 07:54;  Status DC


Midazolam HCl (Versed) 5 mg STK-MED ONCE .ROUTE ;  Start 3/23/20 at 07:57;  Stop

3/23/20 at 07:57;  Status DC


Fentanyl Citrate 30 ml @ 0 mls/hr CONT  PRN IV SEE PROTOCOL Last administered on

4/3/20at 16:27;  Start 3/23/20 at 08:15


Artificial Tears (Artificial Tears) 1 drop PRN Q1HR  PRN OU DRY EYE;  Start 3/2

3/20 at 08:15


Midazolam HCl 50 mg/Sodium Chloride 50 ml @ 0 mls/hr CONT  PRN IV SEE PROTOCOL 

Last administered on 3/26/20at 22:39;  Start 3/23/20 at 08:15;  Stop 3/28/20 at 

15:59;  Status DC


Etomidate (Amidate) 8 mg 1X  ONCE IV  Last administered on 3/23/20at 08:33;  

Start 3/23/20 at 08:30;  Stop 3/23/20 at 08:31;  Status DC


Succinylcholine Chloride (Anectine) 120 mg 1X  ONCE IV  Last administered on 

3/23/20at 08:34;  Start 3/23/20 at 08:30;  Stop 3/23/20 at 08:31;  Status DC


Midazolam HCl (Versed) 5 mg 1X  ONCE IV ;  Start 3/23/20 at 08:30;  Stop 3/23/20

at 08:31;  Status DC


Potassium Chloride 15 meq/ Bicarbonate Dialysis Soln w/ out KCl 5,007.5 ml  @ 

1,000 mls/ hr Q5H1M IV  Last administered on 3/24/20at 11:11;  Start 3/23/20 at 

12:00;  Stop 3/24/20 at 11:15;  Status DC


Potassium Chloride 15 meq/ Bicarbonate Dialysis Soln w/ out KCl 5,007.5 ml  @ 

1,000 mls/ hr Q5H1M IV  Last administered on 3/24/20at 11:12;  Start 3/23/20 at 

12:00;  Stop 3/24/20 at 11:17;  Status DC


Potassium Chloride 15 meq/ Bicarbonate Dialysis Soln w/ out KCl 5,007.5 ml  @ 

1,000 mls/ hr Q5H1M IV  Last administered on 3/24/20at 11:11;  Start 3/23/20 at 

12:00;  Stop 3/24/20 at 11:19;  Status DC


Sodium Chloride 90 meq/Potassium Chloride 15 meq/ Potassium Phosphate 10 mmol/ 

Magnesium Sulfate 10 meq/Calcium Gluconate 20 meq/ Multivitamins 10 ml/Chromium/

Copper/Manganese/ Seleni/Zn 0.5 ml/ Total Parenteral Nutrition/Amino 

Acids/Dextrose/ Fat Emulsion Intravenous 1,400 ml @  58.333 mls/ hr TPN  CONT IV

 Last administered on 3/23/20at 21:42;  Start 3/23/20 at 22:00;  Stop 3/24/20 at

21:59;  Status DC


Heparin Sodium (Porcine) (Heparin Sodium) 5,000 unit Q8HRS SQ  Last administered

on 3/28/20at 05:55;  Start 3/23/20 at 15:00;  Stop 3/28/20 at 13:28;  Status DC


Meropenem 500 mg/ Sodium Chloride 50 ml @  100 mls/hr Q6HRS IV  Last 

administered on 3/25/20at 06:00;  Start 3/24/20 at 09:00;  Stop 3/25/20 at 

07:29;  Status DC


Potassium Phosphate 20 mmol/ Sodium Chloride 106.6667 ml @  51.667 m... 1X  ONCE

IV  Last administered on 3/24/20at 11:22;  Start 3/24/20 at 10:15;  Stop 3/24/20

at 12:18;  Status DC


Acetaminophen (Tylenol Supp) 650 mg PRN Q6HRS  PRN OK MILD PAIN / TEMP Last ad

ministered on 3/24/20at 10:37;  Start 3/24/20 at 10:30


Potassium Chloride/Water 100 ml @  100 mls/hr Q1H IV  Last administered on 

3/24/20at 12:12;  Start 3/24/20 at 11:00;  Stop 3/24/20 at 12:59;  Status DC


Potassium Chloride 20 meq/ Bicarbonate Dialysis Soln w/ out KCl 5,010 ml @  

1,000 mls/hr Q5H1M IV  Last administered on 3/25/20at 08:48;  Start 3/24/20 at 

12:00;  Stop 3/25/20 at 13:03;  Status DC


Potassium Chloride 20 meq/ Bicarbonate Dialysis Soln w/ out KCl 5,010 ml @  

1,000 mls/hr Q5H1M IV  Last administered on 3/29/20at 14:52;  Start 3/24/20 at 

11:30;  Stop 3/29/20 at 19:59;  Status DC


Potassium Chloride 20 meq/ Bicarbonate Dialysis Soln w/ out KCl 5,010 ml @  

1,000 mls/hr Q5H1M IV  Last administered on 3/29/20at 14:53;  Start 3/24/20 at 

11:30;  Stop 3/29/20 at 19:59;  Status DC


Sodium Chloride 90 meq/Potassium Chloride 15 meq/ Potassium Phosphate 15 mmol/ 

Magnesium Sulfate 10 meq/Calcium Gluconate 15 meq/ Multivitamins 10 ml/Chromium/

Copper/Manganese/ Seleni/Zn 0.5 ml/ Total Parenteral Nutrition/Amino 

Acids/Dextrose/ Fat Emulsion Intravenous 1,400 ml @  58.333 mls/ hr TPN  CONT IV

 Last administered on 3/24/20at 22:17;  Start 3/24/20 at 22:00;  Stop 3/25/20 at

21:59;  Status DC


Cefepime HCl (Maxipime) 2 gm Q12HR IVP  Last administered on 4/3/20at 12:43;  

Start 3/25/20 at 09:00


Daptomycin 500 mg/ Sodium Chloride 50 ml @  100 mls/hr Q48H IV  Last 

administered on 4/2/20at 08:29;  Start 3/25/20 at 08:30


Lidocaine HCl (Buffered Lidocaine 1%) 3 ml 1X  ONCE INJ  Last administered on 

3/25/20at 10:27;  Start 3/25/20 at 10:30;  Stop 3/25/20 at 10:31;  Status DC


Potassium Phosphate 20 mmol/ Sodium Chloride 106.6667 ml @  51.667 m... 1X  ONCE

IV  Last administered on 3/25/20at 12:51;  Start 3/25/20 at 13:00;  Stop 3/25/20

at 15:03;  Status DC


Sodium Chloride 90 meq/Potassium Chloride 15 meq/ Potassium Phosphate 18 mmol/ 

Magnesium Sulfate 8 meq/Calcium Gluconate 15 meq/ Multivitamins 10 ml/Chromium/ 

Copper/Manganese/ Seleni/Zn 0.5 ml/ Total Parenteral Nutrition/Amino 

Acids/Dextrose/ Fat Emulsion Intravenous 1,400 ml @  58.333 mls/ hr TPN  CONT IV

 Last administered on 3/25/20at 22:16;  Start 3/25/20 at 22:00;  Stop 3/26/20 at

21:59;  Status DC


Potassium Chloride 20 meq/ Bicarbonate Dialysis Soln w/ out KCl 5,010 ml @  

1,000 mls/hr Q5H1M IV  Last administered on 3/29/20at 14:54;  Start 3/25/20 at 

16:00;  Stop 3/29/20 at 19:59;  Status DC


Multi-Ingred Cream/Lotion/Oil/ Oint (Artificial Tears Eye Ointment) 1 thomas PRN 

Q1HR  PRN OU DRY EYE Last administered on 4/3/20at 11:05;  Start 3/25/20 at 

17:30


Sodium Chloride 90 meq/Potassium Chloride 15 meq/ Potassium Phosphate 18 mmol/ 

Magnesium Sulfate 8 meq/Calcium Gluconate 15 meq/ Multivitamins 10 ml/Chromium/ 

Copper/Manganese/ Seleni/Zn 0.5 ml/ Total Parenteral Nutrition/Amino 

Acids/Dextrose/ Fat Emulsion Intravenous 1,400 ml @  58.333 mls/ hr TPN  CONT IV

 Last administered on 3/26/20at 22:00;  Start 3/26/20 at 22:00;  Stop 3/27/20 at

21:59;  Status DC


Albumin Human 500 ml @  125 mls/hr 1X  ONCE IV ;  Start 3/26/20 at 14:15;  Stop 

3/26/20 at 18:14;  Status DC


Sodium Chloride 90 meq/Potassium Chloride 15 meq/ Potassium Phosphate 18 mmol/ 

Magnesium Sulfate 8 meq/Calcium Gluconate 15 meq/ Multivitamins 10 ml/Chromium/ 

Copper/Manganese/ Seleni/Zn 0.5 ml/ Insulin Human Regular 10 unit/ Total 

Parenteral Nutrition/Amino Acids/Dextrose/ Fat Emulsion Intravenous 1,400 ml @  

58.333 mls/ hr TPN  CONT IV  Last administered on 3/27/20at 21:43;  Start 

3/27/20 at 22:00;  Stop 3/28/20 at 21:59;  Status DC


Lidocaine HCl (Buffered Lidocaine 1%) 3 ml STK-MED ONCE .ROUTE ;  Start 3/25/20 

at 10:00;  Stop 3/27/20 at 13:57;  Status DC


Midazolam HCl 100 mg/Sodium Chloride 100 ml @ 7 mls/hr CONT  PRN IV SEE PROTOCOL

Last administered on 4/1/20at 13:57;  Start 3/28/20 at 16:00


Sodium Chloride 90 meq/Potassium Chloride 15 meq/ Potassium Phosphate 18 mmol/ 

Magnesium Sulfate 8 meq/Calcium Gluconate 15 meq/ Multivitamins 10 ml/Chromium/ 

Copper/Manganese/ Seleni/Zn 0.5 ml/ Insulin Human Regular 15 unit/ Total 

Parenteral Nutrition/Amino Acids/Dextrose/ Fat Emulsion Intravenous 1,400 ml @  

58.333 mls/ hr TPN  CONT IV  Last administered on 3/28/20at 20:34;  Start 

3/28/20 at 22:00;  Stop 3/29/20 at 21:59;  Status DC


Info ([facility] Electrolyte Protocol) 1 ea CONT PRN  PRN MC PER PROTOCOL;  Start 

3/29/20 at 13:15


Sodium Chloride 90 meq/Potassium Chloride 15 meq/ Potassium Phosphate 18 mmol/ 

Magnesium Sulfate 8 meq/Calcium Gluconate 15 meq/ Multivitamins 10 ml/Chromium/ 

Copper/Manganese/ Seleni/Zn 0.5 ml/ Insulin Human Regular 15 unit/ Total 

Parenteral Nutrition/Amino Acids/Dextrose/ Fat Emulsion Intravenous 1,400 ml @  

58.333 mls/ hr TPN  CONT IV  Last administered on 3/29/20at 22:05;  Start 

3/29/20 at 22:00;  Stop 3/30/20 at 21:59;  Status DC


Potassium Chloride 15 meq/ Bicarbonate Dialysis Soln w/ out KCl 5,007.5 ml  @ 

1,000 mls/ hr Q5H1M IV  Last administered on 4/1/20at 18:14;  Start 3/29/20 at 

20:00;  Stop 4/2/20 at 13:08;  Status DC


Potassium Chloride 15 meq/ Bicarbonate Dialysis Soln w/ out KCl 5,007.5 ml  @ 

1,000 mls/ hr Q5H1M IV  Last administered on 4/1/20at 18:14;  Start 3/29/20 at 

20:00;  Stop 4/2/20 at 13:08;  Status DC


Potassium Chloride 15 meq/ Bicarbonate Dialysis Soln w/ out KCl 5,007.5 ml  @ 

1,000 mls/ hr Q5H1M IV  Last administered on 4/1/20at 18:14;  Start 3/29/20 at 

20:00;  Stop 4/2/20 at 13:08;  Status DC


Iohexol (Omnipaque 240 Mg/ml) 30 ml 1X  ONCE PO  Last administered on 3/30/20at 

11:30;  Start 3/30/20 at 11:30;  Stop 3/30/20 at 11:33;  Status DC


Info (CONTRAST GIVEN -- Rx MONITORING) 1 each PRN DAILY  PRN MC SEE COMMENTS;  

Start 3/30/20 at 11:45;  Stop 4/1/20 at 11:44;  Status DC


Sodium Chloride 90 meq/Potassium Chloride 15 meq/ Potassium Phosphate 18 mmol/ 

Magnesium Sulfate 8 meq/Calcium Gluconate 15 meq/ Multivitamins 10 ml/Chromium/ 

Copper/Manganese/ Seleni/Zn 0.5 ml/ Insulin Human Regular 15 unit/ Total 

Parenteral Nutrition/Amino Acids/Dextrose/ Fat Emulsion Intravenous 1,400 ml @  

58.333 mls/ hr TPN  CONT IV  Last administered on 3/30/20at 21:47;  Start 

3/30/20 at 22:00;  Stop 3/31/20 at 21:59;  Status DC


Sodium Chloride 90 meq/Potassium Chloride 15 meq/ Potassium Phosphate 18 mmol/ 

Magnesium Sulfate 8 meq/Calcium Gluconate 15 meq/ Multivitamins 10 ml/Chromium/ 

Copper/Manganese/ Seleni/Zn 0.5 ml/ Insulin Human Regular 20 unit/ Total 

Parenteral Nutrition/Amino Acids/Dextrose/ Fat Emulsion Intravenous 1,400 ml @  

58.333 mls/ hr TPN  CONT IV  Last administered on 3/31/20at 21:36;  Start 

3/31/20 at 22:00;  Stop 4/1/20 at 21:59;  Status DC


Alteplase, Recombinant (Cathflo For Central Catheter Clearance) 1 mg 1X  ONCE 

INT CAT  Last administered on 3/31/20at 20:03;  Start 3/31/20 at 19:30;  Stop 

3/31/20 at 19:46;  Status DC


Alteplase, Recombinant (Cathflo For Central Catheter Clearance) 1 mg 1X  ONCE 

INT CAT  Last administered on 3/31/20at 22:05;  Start 3/31/20 at 22:00;  Stop 

3/31/20 at 22:01;  Status DC


Sodium Chloride 90 meq/Potassium Chloride 15 meq/ Potassium Phosphate 18 mmol/ 

Magnesium Sulfate 8 meq/Calcium Gluconate 15 meq/ Multivitamins 10 ml/Chromium/ 

Copper/Manganese/ Seleni/Zn 0.5 ml/ Insulin Human Regular 20 unit/ Total 

Parenteral Nutrition/Amino Acids/Dextrose/ Fat Emulsion Intravenous 1,400 ml @  

58.333 mls/ hr TPN  CONT IV  Last administered on 4/1/20at 21:30;  Start 4/1/20 

at 22:00;  Stop 4/2/20 at 21:59;  Status DC


Dexmedetomidine HCl 400 mcg/ Sodium Chloride 100 ml @ 0 mls/hr CONT  PRN IV 

ANXIETY / AGITATION Last administered on 4/3/20at 13:52;  Start 4/2/20 at 08:15


Sodium Chloride 500 ml @  500 mls/hr 1X PRN  PRN IV ELEVATED BP, SEE COMMENTS;  

Start 4/2/20 at 08:15


Atropine Sulfate (ATROPINE 0.5mg SYRINGE) 0.5 mg PRN Q5MIN  PRN IV SEE COMMENTS;

 Start 4/2/20 at 08:15


Furosemide (Lasix) 20 mg 1X  ONCE IVP  Last administered on 4/2/20at 08:19;  

Start 4/2/20 at 08:15;  Stop 4/2/20 at 08:16;  Status DC


Lidocaine HCl (Buffered Lidocaine 1%) 3 ml STK-MED ONCE .ROUTE ;  Start 4/2/20 

at 08:39;  Stop 4/2/20 at 08:39;  Status DC


Lidocaine HCl (Buffered Lidocaine 1%) 6 ml 1X  ONCE INJ  Last administered on 

4/2/20at 09:05;  Start 4/2/20 at 09:00;  Stop 4/2/20 at 09:06;  Status DC


Sodium Chloride 90 meq/Potassium Chloride 15 meq/ Potassium Phosphate 18 mmol/ 

Magnesium Sulfate 8 meq/Calcium Gluconate 15 meq/ Multivitamins 10 ml/Chromium/ 

Copper/Manganese/ Seleni/Zn 0.5 ml/ Insulin Human Regular 20 unit/ Total 

Parenteral Nutrition/Amino Acids/Dextrose/ Fat Emulsion Intravenous 1,400 ml @  

58.333 mls/ hr TPN  CONT IV  Last administered on 4/2/20at 22:45;  Start 4/2/20 

at 22:00;  Stop 4/3/20 at 21:59


Sodium Chloride 1,000 ml @  1,000 mls/hr Q1H PRN IV hypotension;  Start 4/3/20 

at 07:30;  Stop 4/3/20 at 13:29;  Status DC


Albumin Human 200 ml @  200 mls/hr 1X PRN  PRN IV Hypotension Last administered 

on 4/3/20at 09:36;  Start 4/3/20 at 07:30;  Stop 4/3/20 at 13:29;  Status DC


Sodium Chloride (Normal Saline Flush) 10 ml 1X PRN  PRN IV AP catheter pack;  

Start 4/3/20 at 07:30;  Stop 4/4/20 at 07:29


Sodium Chloride (Normal Saline Flush) 10 ml 1X PRN  PRN IV  catheter pack;  

Start 4/3/20 at 07:30;  Stop 4/4/20 at 07:29


Sodium Chloride 1,000 ml @  400 mls/hr Q2H30M PRN IV PATENCY;  Start 4/3/20 at 

07:30;  Stop 4/3/20 at 19:29


Info (PHARMACY MONITORING -- do not chart) 1 each PRN DAILY  PRN MC SEE 

COMMENTS;  Start 4/3/20 at 07:30;  Stop 4/3/20 at 13:02;  Status DC


Info (PHARMACY MONITORING -- do not chart) 1 each PRN DAILY  PRN MC SEE 

COMMENTS;  Start 4/3/20 at 07:30


Sodium Chloride 90 meq/Potassium Chloride 15 meq/ Potassium Phosphate 10 mmol/ 

Magnesium Sulfate 8 meq/Calcium Gluconate 15 meq/ Multivitamins 10 ml/Chromium/ 

Copper/Manganese/ Seleni/Zn 0.5 ml/ Insulin Human Regular 25 unit/ Total Pare

nteral Nutrition/Amino Acids/Dextrose/ Fat Emulsion Intravenous 1,400 ml @  

58.333 mls/ hr TPN  CONT IV ;  Start 4/3/20 at 22:00;  Stop 4/4/20 at 21:59


Heparin Sodium (Porcine) (Heparin Sodium) 5,000 unit Q12HR SQ ;  Start 4/3/20 at

21:00





Active Scripts


Active


Reported


Bisoprolol Fumarate 5 Mg Tablet 10 Mg PO DAILY


Vitals/I & O





Vital Sign - Last 24 Hours








 4/2/20 4/2/20 4/2/20 4/2/20





 20:00 20:00 20:12 21:00


 


Temp  99.1  





  99.1  


 


Pulse  66  79


 


Resp  24 25


 


B/P (MAP)  97/59 (72)  95/57 (70)


 


Pulse Ox  100 100 100


 


O2 Delivery Mechanical Ventilator Ventilator Ventilator Ventilator


 


    





    





 4/2/20 4/2/20 4/2/20 4/2/20





 22:00 23:00 23:31 23:59


 


Temp    99.4





    99.4


 


Pulse 79 77  77


 


Resp 25 24  24


 


B/P (MAP) 96/59 (71) 102/62 (75)  96/59 (71)


 


Pulse Ox 100 100 100 98


 


O2 Delivery Ventilator Ventilator Ventilator Ventilator


 


    





    





 4/2/20 4/3/20 4/3/20 4/3/20





 23:59 01:00 02:00 03:00


 


Pulse  78 77 78


 


Resp  22 25 22


 


B/P (MAP)  101/59 (73) 105/59 (74) 108/62 (77)


 


Pulse Ox  100 100 100


 


O2 Delivery Mechanical Ventilator Ventilator Ventilator Ventilator





 4/3/20 4/3/20 4/3/20 4/3/20





 04:00 04:00 04:05 04:40


 


Temp 99.8   





 99.8   


 


Pulse 79   


 


Resp 25   


 


B/P (MAP) 98/60 (73)   


 


Pulse Ox 100  99 100


 


O2 Delivery Ventilator Mechanical Ventilator Ventilator 


 


O2 Flow Rate    6.0


 


    





    





 4/3/20 4/3/20 4/3/20 4/3/20





 05:00 05:12 06:00 07:00


 


Temp    99.6





    99.6


 


Pulse 78  76 73


 


Resp 25  24 25


 


B/P (MAP) 102/56 (71)  98/52 (67) 92/53 (66)


 


Pulse Ox 99 100 100 99


 


O2 Delivery Ventilator  Ventilator Ventilator


 


O2 Flow Rate  6.0  


 


    





    





 4/3/20 4/3/20 4/3/20 4/3/20





 08:00 08:00 09:00 09:02


 


Pulse 80  84 


 


Resp 25  25 


 


B/P (MAP) 114/65 (81)  85/54 (64) 


 


Pulse Ox 99  99 99


 


O2 Delivery Ventilator Mechanical Ventilator Ventilator Ventilator





 4/3/20 4/3/20 4/3/20 4/3/20





 10:00 11:00 11:47 12:00


 


Pulse 82 81  77


 


Resp 25 25  25


 


B/P (MAP) 98/63 (75) 121/68 (85)  93/61 (72)


 


Pulse Ox 100 99 99 99


 


O2 Delivery Ventilator Ventilator Ventilator Ventilator





 4/3/20 4/3/20 4/3/20 4/3/20





 12:23 13:00 14:00 15:00


 


Temp  99.0  99.1





  99.0  99.1


 


Pulse  84 83 80


 


Resp  25 25 25


 


B/P (MAP)  133/72 (92) 87/45 (59) 131/66 (87)


 


Pulse Ox  100 100 97


 


O2 Delivery Mechanical Ventilator Ventilator Ventilator Ventilator


 


    





    





 4/3/20 4/3/20 4/3/20 4/3/20





 15:17 16:00 16:00 16:27


 


Pulse   86 


 


Resp   25 25


 


B/P (MAP)   116/68 (84) 


 


Pulse Ox 97  100 100


 


O2 Delivery Ventilator Mechanical Ventilator Ventilator Ventilator





 4/3/20 4/3/20 4/3/20 





 17:00 17:05 18:00 


 


Pulse 81  84 


 


Resp 25 25 25 


 


B/P (MAP) 108/60 (76)  103/62 (76) 


 


Pulse Ox 100 100 100 


 


O2 Delivery Ventilator Ventilator Ventilator 














Intake and Output   


 


 4/2/20 4/2/20 4/3/20





 15:00 23:00 07:00


 


Intake Total 250 ml 981 ml 949 ml


 


Output Total 230 ml 565 ml 535 ml


 


Balance 20 ml 416 ml 414 ml











Hemodynamically unstable?:  No


Is patient in severe pain?:  No


Is NPO status required?:  Yes











FARELA,ANAT MD              Apr 3, 2020 19:25

## 2020-04-03 NOTE — NUR
pt moved to pacu/icu per icu bed. pt rotated to supine position. pt breathing along with 
ventilator. abg's wnl today.

levophed continues to infuse titrated as per bp. dialysis scheduled for daily. 

attempted to call family to update about daily care and to inform of room change. only able 
to leave message on [facility] phone.

## 2020-04-03 NOTE — NUR
SS following up with discharge planning. SS discussed with pt RN. Pt remains on the vent at 
this time. Pt on daily hemodialysis. Possible trach scheduled for 4/6/2020. SS will continue 
to follow for discharge planning.

## 2020-04-03 NOTE — PDOC
G I PROGRESS NOTE


Subjective


Out of room when rounded, not seen.


Objective


Others' notes reviewed.


Physical Exam


No PE.


Review of Relevant


I have reviewed the following items josy (where applicable) has been applied.


Labs





Laboratory Tests








Test


 4/1/20


16:00 4/2/20


00:01 4/2/20


05:30 4/2/20


05:48


 


White Blood Count


 22.6 x10^3/uL


(4.0-11.0) 


 


 





 


Red Blood Count


 2.75 x10^6/uL


(3.50-5.40) 


 


 





 


Hemoglobin


 8.9 g/dL


(12.0-15.5) 


 


 





 


Hematocrit


 26.9 %


(36.0-47.0) 


 


 





 


Mean Corpuscular Volume 98 fL ()    


 


Mean Corpuscular Hemoglobin 32 pg (25-35)    


 


Mean Corpuscular Hemoglobin


Concent 33 g/dL


(31-37) 


 


 





 


Red Cell Distribution Width


 17.1 %


(11.5-14.5) 


 


 





 


Platelet Count


 257 x10^3/uL


(140-400) 


 


 





 


Neutrophils (%) (Auto) 82 % (31-73)    


 


Lymphocytes (%) (Auto) 10 % (24-48)    


 


Monocytes (%) (Auto) 6 % (0-9)    


 


Eosinophils (%) (Auto) 2 % (0-3)    


 


Basophils (%) (Auto) 1 % (0-3)    


 


Neutrophils # (Auto)


 18.5 x10^3/uL


(1.8-7.7) 


 


 





 


Lymphocytes # (Auto)


 2.2 x10^3/uL


(1.0-4.8) 


 


 





 


Monocytes # (Auto)


 1.3 x10^3/uL


(0.0-1.1) 


 


 





 


Eosinophils # (Auto)


 0.5 x10^3/uL


(0.0-0.7) 


 


 





 


Basophils # (Auto)


 0.1 x10^3/uL


(0.0-0.2) 


 


 





 


Sodium Level


 137 mmol/L


(136-145) 138 mmol/L


(136-145) 138 mmol/L


(136-145) 





 


Potassium Level


 3.9 mmol/L


(3.5-5.1) 3.8 mmol/L


(3.5-5.1) 3.7 mmol/L


(3.5-5.1) 





 


Chloride Level


 104 mmol/L


() 105 mmol/L


() 105 mmol/L


() 





 


Carbon Dioxide Level


 28 mmol/L


(21-32) 27 mmol/L


(21-32) 25 mmol/L


(21-32) 





 


Anion Gap 5 (6-14)  6 (6-14)  8 (6-14)  


 


Blood Urea Nitrogen


 30 mg/dL


(7-20) 33 mg/dL


(7-20) 38 mg/dL


(7-20) 





 


Creatinine


 1.1 mg/dL


(0.6-1.0) 1.2 mg/dL


(0.6-1.0) 1.3 mg/dL


(0.6-1.0) 





 


Estimated GFR


(Cockcroft-Gault) 52.8 


 47.7 


 43.5 


 





 


Glucose Level


 170 mg/dL


(70-99) 173 mg/dL


(70-99) 180 mg/dL


(70-99) 





 


Calcium Level


 8.2 mg/dL


(8.5-10.1) 8.0 mg/dL


(8.5-10.1) 8.0 mg/dL


(8.5-10.1) 





 


Phosphorus Level


 2.9 mg/dL


(2.6-4.7) 3.2 mg/dL


(2.6-4.7) 3.1 mg/dL


(2.6-4.7) 





 


Magnesium Level


 2.3 mg/dL


(1.8-2.4) 2.0 mg/dL


(1.8-2.4) 2.0 mg/dL


(1.8-2.4) 





 


Glucose (Fingerstick)


 


 


 


 168 mg/dL


(70-99)


 


Test


 4/2/20


08:00 4/2/20


17:16 4/3/20


05:51 4/3/20


06:00


 


O2 Saturation 91 % (92-99)    


 


Arterial Blood pH


 7.43


(7.35-7.45) 


 


 





 


Arterial Blood pCO2 at


Patient Temp 31 mmHg


(35-46) 


 


 





 


Arterial Blood pO2 at Patient


Temp 65 mmHg


() 


 


 





 


Arterial Blood HCO3


 20 mmol/L


(21-28) 


 


 





 


Arterial Blood Base Excess


 -3 mmol/L


(-3-3) 


 


 





 


FiO2 30    


 


Glucose (Fingerstick)


 


 211 mg/dL


(70-99) 209 mg/dL


(70-99) 





 


White Blood Count


 


 


 


 15.7 x10^3/uL


(4.0-11.0)


 


Red Blood Count


 


 


 


 2.57 x10^6/uL


(3.50-5.40)


 


Hemoglobin


 


 


 


 8.2 g/dL


(12.0-15.5)


 


Hematocrit


 


 


 


 25.8 %


(36.0-47.0)


 


Mean Corpuscular Volume


 


 


 


 101 fL


()


 


Mean Corpuscular Hemoglobin    32 pg (25-35) 


 


Mean Corpuscular Hemoglobin


Concent 


 


 


 32 g/dL


(31-37)


 


Red Cell Distribution Width


 


 


 


 18.9 %


(11.5-14.5)


 


Platelet Count


 


 


 


 233 x10^3/uL


(140-400)


 


Neutrophils (%) (Auto)    83 % (31-73) 


 


Lymphocytes (%) (Auto)    7 % (24-48) 


 


Monocytes (%) (Auto)    8 % (0-9) 


 


Eosinophils (%) (Auto)    2 % (0-3) 


 


Basophils (%) (Auto)    0 % (0-3) 


 


Neutrophils # (Auto)


 


 


 


 13.0 x10^3/uL


(1.8-7.7)


 


Lymphocytes # (Auto)


 


 


 


 1.1 x10^3/uL


(1.0-4.8)


 


Monocytes # (Auto)


 


 


 


 1.2 x10^3/uL


(0.0-1.1)


 


Eosinophils # (Auto)


 


 


 


 0.4 x10^3/uL


(0.0-0.7)


 


Basophils # (Auto)


 


 


 


 0.1 x10^3/uL


(0.0-0.2)


 


Segmented Neutrophils %    58 % (35-66) 


 


Band Neutrophils %    19 % (0-9) 


 


Lymphocytes %    13 % (24-48) 


 


Monocytes %    4 % (0-10) 


 


Eosinophils %    4 % (0-5) 


 


Myelocytes %    2 % (0-0) 


 


Nucleated Red Blood Cells    2 


 


Platelet Estimate


 


 


 


 Adequate


(ADEQUATE)


 


Giant Platelets    Occ 


 


Anisocytosis    Slight 


 


Sodium Level


 


 


 


 141 mmol/L


(136-145)


 


Potassium Level


 


 


 


 3.8 mmol/L


(3.5-5.1)


 


Chloride Level


 


 


 


 107 mmol/L


()


 


Carbon Dioxide Level


 


 


 


 20 mmol/L


(21-32)


 


Anion Gap    14 (6-14) 


 


Blood Urea Nitrogen


 


 


 


 63 mg/dL


(7-20)


 


Creatinine


 


 


 


 2.0 mg/dL


(0.6-1.0)


 


Estimated GFR


(Cockcroft-Gault) 


 


 


 26.5 





 


BUN/Creatinine Ratio    32 (6-20) 


 


Glucose Level


 


 


 


 212 mg/dL


(70-99)


 


Calcium Level


 


 


 


 8.0 mg/dL


(8.5-10.1)


 


Phosphorus Level


 


 


 


 4.7 mg/dL


(2.6-4.7)


 


Magnesium Level


 


 


 


 2.1 mg/dL


(1.8-2.4)


 


Total Bilirubin


 


 


 


 0.9 mg/dL


(0.2-1.0)


 


Aspartate Amino Transf


(AST/SGOT) 


 


 


 46 U/L (15-37) 





 


Alanine Aminotransferase


(ALT/SGPT) 


 


 


 20 U/L (14-59) 





 


Alkaline Phosphatase


 


 


 


 95 U/L


()


 


Total Protein


 


 


 


 4.1 g/dL


(6.4-8.2)


 


Albumin


 


 


 


 1.8 g/dL


(3.4-5.0)


 


Albumin/Globulin Ratio    0.8 (1.0-1.7) 


 


Lipase


 


 


 


 823 U/L


()


 


Test


 4/3/20


13:11 


 


 





 


Glucose (Fingerstick)


 159 mg/dL


(70-99) 


 


 











Laboratory Tests








Test


 4/2/20


17:16 4/3/20


05:51 4/3/20


06:00 4/3/20


13:11


 


Glucose (Fingerstick)


 211 mg/dL


(70-99) 209 mg/dL


(70-99) 


 159 mg/dL


(70-99)


 


White Blood Count


 


 


 15.7 x10^3/uL


(4.0-11.0) 





 


Red Blood Count


 


 


 2.57 x10^6/uL


(3.50-5.40) 





 


Hemoglobin


 


 


 8.2 g/dL


(12.0-15.5) 





 


Hematocrit


 


 


 25.8 %


(36.0-47.0) 





 


Mean Corpuscular Volume


 


 


 101 fL


() 





 


Mean Corpuscular Hemoglobin   32 pg (25-35)  


 


Mean Corpuscular Hemoglobin


Concent 


 


 32 g/dL


(31-37) 





 


Red Cell Distribution Width


 


 


 18.9 %


(11.5-14.5) 





 


Platelet Count


 


 


 233 x10^3/uL


(140-400) 





 


Neutrophils (%) (Auto)   83 % (31-73)  


 


Lymphocytes (%) (Auto)   7 % (24-48)  


 


Monocytes (%) (Auto)   8 % (0-9)  


 


Eosinophils (%) (Auto)   2 % (0-3)  


 


Basophils (%) (Auto)   0 % (0-3)  


 


Neutrophils # (Auto)


 


 


 13.0 x10^3/uL


(1.8-7.7) 





 


Lymphocytes # (Auto)


 


 


 1.1 x10^3/uL


(1.0-4.8) 





 


Monocytes # (Auto)


 


 


 1.2 x10^3/uL


(0.0-1.1) 





 


Eosinophils # (Auto)


 


 


 0.4 x10^3/uL


(0.0-0.7) 





 


Basophils # (Auto)


 


 


 0.1 x10^3/uL


(0.0-0.2) 





 


Segmented Neutrophils %   58 % (35-66)  


 


Band Neutrophils %   19 % (0-9)  


 


Lymphocytes %   13 % (24-48)  


 


Monocytes %   4 % (0-10)  


 


Eosinophils %   4 % (0-5)  


 


Myelocytes %   2 % (0-0)  


 


Nucleated Red Blood Cells   2  


 


Platelet Estimate


 


 


 Adequate


(ADEQUATE) 





 


Giant Platelets   Occ  


 


Anisocytosis   Slight  


 


Sodium Level


 


 


 141 mmol/L


(136-145) 





 


Potassium Level


 


 


 3.8 mmol/L


(3.5-5.1) 





 


Chloride Level


 


 


 107 mmol/L


() 





 


Carbon Dioxide Level


 


 


 20 mmol/L


(21-32) 





 


Anion Gap   14 (6-14)  


 


Blood Urea Nitrogen


 


 


 63 mg/dL


(7-20) 





 


Creatinine


 


 


 2.0 mg/dL


(0.6-1.0) 





 


Estimated GFR


(Cockcroft-Gault) 


 


 26.5 


 





 


BUN/Creatinine Ratio   32 (6-20)  


 


Glucose Level


 


 


 212 mg/dL


(70-99) 





 


Calcium Level


 


 


 8.0 mg/dL


(8.5-10.1) 





 


Phosphorus Level


 


 


 4.7 mg/dL


(2.6-4.7) 





 


Magnesium Level


 


 


 2.1 mg/dL


(1.8-2.4) 





 


Total Bilirubin


 


 


 0.9 mg/dL


(0.2-1.0) 





 


Aspartate Amino Transf


(AST/SGOT) 


 


 46 U/L (15-37) 


 





 


Alanine Aminotransferase


(ALT/SGPT) 


 


 20 U/L (14-59) 


 





 


Alkaline Phosphatase


 


 


 95 U/L


() 





 


Total Protein


 


 


 4.1 g/dL


(6.4-8.2) 





 


Albumin


 


 


 1.8 g/dL


(3.4-5.0) 





 


Albumin/Globulin Ratio   0.8 (1.0-1.7)  


 


Lipase


 


 


 823 U/L


() 











Microbiology


3/24/20 Blood Culture - Final, Complete


          NO GROWTH AFTER 5 DAYS


Vitals/I & O





Vital Sign - Last 24 Hours








 4/2/20 4/2/20 4/2/20 4/2/20





 15:00 15:38 15:50 16:00


 


Temp    99.7





    99.7


 


Pulse 88   81


 


Resp 25   25


 


B/P (MAP) 125/63 (83)   99/59 (72)


 


Pulse Ox 100  100 100


 


O2 Delivery Ventilator Mechanical Ventilator Ventilator Ventilator


 


    





    





 4/2/20 4/2/20 4/2/20 4/2/20





 17:00 18:00 19:00 20:00


 


Pulse 81 80 79 


 


Resp 25 25 25 


 


B/P (MAP) 107/49 (68) 103/62 (76) 104/65 (78) 


 


Pulse Ox 100 100 100 


 


O2 Delivery Ventilator Ventilator Ventilator Mechanical Ventilator





 4/2/20 4/2/20 4/2/20 4/2/20





 20:00 20:12 21:00 22:00


 


Temp 99.1   





 99.1   


 


Pulse 66  79 79


 


Resp 24 25 25


 


B/P (MAP) 97/59 (72)  95/57 (70) 96/59 (71)


 


Pulse Ox 100 100 100 100


 


O2 Delivery Ventilator Ventilator Ventilator Ventilator


 


    





    





 4/2/20 4/2/20 4/2/20 4/2/20





 23:00 23:31 23:59 23:59


 


Temp   99.4 





   99.4 


 


Pulse 77  77 


 


Resp 24 24 


 


B/P (MAP) 102/62 (75)  96/59 (71) 


 


Pulse Ox 100 100 98 


 


O2 Delivery Ventilator Ventilator Ventilator Mechanical Ventilator


 


    





    





 4/3/20 4/3/20 4/3/20 4/3/20





 01:00 02:00 03:00 04:00


 


Temp    99.8





    99.8


 


Pulse 78 77 78 79


 


Resp 22 25 22 25


 


B/P (MAP) 101/59 (73) 105/59 (74) 108/62 (77) 98/60 (73)


 


Pulse Ox 100 100 100 100


 


O2 Delivery Ventilator Ventilator Ventilator Ventilator


 


    





    





 4/3/20 4/3/20 4/3/20 4/3/20





 04:00 04:05 04:40 05:00


 


Pulse    78


 


Resp    25


 


B/P (MAP)    102/56 (71)


 


Pulse Ox  99 100 99


 


O2 Delivery Mechanical Ventilator Ventilator  Ventilator


 


O2 Flow Rate   6.0 





 4/3/20 4/3/20 4/3/20 4/3/20





 05:12 06:00 07:00 08:00


 


Temp   99.6 





   99.6 


 


Pulse  76 73 80


 


Resp  24 25 25


 


B/P (MAP)  98/52 (67) 92/53 (66) 114/65 (81)


 


Pulse Ox 100 100 99 99


 


O2 Delivery  Ventilator Ventilator Ventilator


 


O2 Flow Rate 6.0   


 


    





    





 4/3/20 4/3/20 4/3/20 4/3/20





 08:00 09:00 09:02 10:00


 


Pulse  84  82


 


Resp  25  25


 


B/P (MAP)  85/54 (64)  98/63 (75)


 


Pulse Ox  99 99 100


 


O2 Delivery Mechanical Ventilator Ventilator Ventilator Ventilator





 4/3/20 4/3/20 4/3/20 4/3/20





 11:00 11:47 12:00 12:23


 


Pulse 81  77 


 


Resp 25  25 


 


B/P (MAP) 121/68 (85)  93/61 (72) 


 


Pulse Ox 99 99 99 


 


O2 Delivery Ventilator Ventilator Ventilator Mechanical Ventilator





 4/3/20 4/3/20  





 13:00 14:00  


 


Temp 99.0   





 99.0   


 


Pulse 84 83  


 


Resp 25 25  


 


B/P (MAP) 133/72 (92) 87/45 (59)  


 


Pulse Ox 100 100  


 


O2 Delivery Ventilator Ventilator  














Intake and Output   


 


 4/2/20 4/2/20 4/3/20





 15:00 23:00 07:00


 


Intake Total 250 ml 981 ml 949 ml


 


Output Total 230 ml 565 ml 535 ml


 


Balance 20 ml 416 ml 414 ml








Problem List


Problems


Medical Problems:


(1) Acute pancreatitis


Status: Acute  





(2) Cholelithiasis


Status: Acute  








Assessment


Biliary pancreatitis, severe, with MOSF.


Plan of Care Note


Continue support.


If improves, ultimately needs cholecystectomy.





Hemodynamically unstable?:  No


Is patient in severe pain?:  No


Is NPO status required?:  Yes











MARCO ANTONIO LONGO MD           Apr 3, 2020 14:54

## 2020-04-03 NOTE — PDOC
Infectious Disease Note


Subjective


Subjective


intubated sedated





ROS


ROS


no n/v


diarrhea +





Vital Sign


Vital Signs





Vital Signs








  Date Time  Temp Pulse Resp B/P (MAP) Pulse Ox O2 Delivery O2 Flow Rate FiO2


 


4/3/20 07:00 99.6 73 25 92/53 (66) 99 Ventilator  





 99.6       


 


4/3/20 05:12       6.0 











Physical Exam


PHYSICAL EXAM


GENERAL: Orally intubated/sedated - generalizd anasarca 


HEENT: Mild icterus, pupils equal, ETT, NGT


NECK:  Supple. 


LUNGS:  Decreased breath sounds at the bases.


HEART:  S1, S2, regular 


ABDOMEN:  Distended, hypoactive BS, Rectal tube in place 


: [place]   


EXTREMITIES: Generalized edema, no cyanosis - some mottling, [place] boots 

bilaterally 


DERMATOLOGIC:  Warm and dry.  No generalized rash.  


CENTRAL NERVOUS SYSTEM: Sedated 


RIJ Temp HDC, RUE-PICC & LIJ - clean





Labs


Lab





Laboratory Tests








Test


 4/2/20


17:16 4/3/20


05:51 4/3/20


06:00


 


Glucose (Fingerstick)


 211 mg/dL


(70-99) 209 mg/dL


(70-99) 





 


White Blood Count


 


 


 15.7 x10^3/uL


(4.0-11.0)


 


Red Blood Count


 


 


 2.57 x10^6/uL


(3.50-5.40)


 


Hemoglobin


 


 


 8.2 g/dL


(12.0-15.5)


 


Hematocrit


 


 


 25.8 %


(36.0-47.0)


 


Mean Corpuscular Volume


 


 


 101 fL


()


 


Mean Corpuscular Hemoglobin   32 pg (25-35) 


 


Mean Corpuscular Hemoglobin


Concent 


 


 32 g/dL


(31-37)


 


Red Cell Distribution Width


 


 


 18.9 %


(11.5-14.5)


 


Platelet Count


 


 


 233 x10^3/uL


(140-400)


 


Neutrophils (%) (Auto)   83 % (31-73) 


 


Lymphocytes (%) (Auto)   7 % (24-48) 


 


Monocytes (%) (Auto)   8 % (0-9) 


 


Eosinophils (%) (Auto)   2 % (0-3) 


 


Basophils (%) (Auto)   0 % (0-3) 


 


Neutrophils # (Auto)


 


 


 13.0 x10^3/uL


(1.8-7.7)


 


Lymphocytes # (Auto)


 


 


 1.1 x10^3/uL


(1.0-4.8)


 


Monocytes # (Auto)


 


 


 1.2 x10^3/uL


(0.0-1.1)


 


Eosinophils # (Auto)


 


 


 0.4 x10^3/uL


(0.0-0.7)


 


Basophils # (Auto)


 


 


 0.1 x10^3/uL


(0.0-0.2)


 


Sodium Level


 


 


 141 mmol/L


(136-145)


 


Potassium Level


 


 


 3.8 mmol/L


(3.5-5.1)


 


Chloride Level


 


 


 107 mmol/L


()


 


Carbon Dioxide Level


 


 


 20 mmol/L


(21-32)


 


Anion Gap   14 (6-14) 


 


Blood Urea Nitrogen


 


 


 63 mg/dL


(7-20)


 


Creatinine


 


 


 2.0 mg/dL


(0.6-1.0)


 


Estimated GFR


(Cockcroft-Gault) 


 


 26.5 





 


BUN/Creatinine Ratio   32 (6-20) 


 


Glucose Level


 


 


 212 mg/dL


(70-99)


 


Calcium Level


 


 


 8.0 mg/dL


(8.5-10.1)


 


Phosphorus Level


 


 


 4.7 mg/dL


(2.6-4.7)


 


Magnesium Level


 


 


 2.1 mg/dL


(1.8-2.4)


 


Total Bilirubin


 


 


 0.9 mg/dL


(0.2-1.0)


 


Aspartate Amino Transf


(AST/SGOT) 


 


 46 U/L (15-37) 





 


Alanine Aminotransferase


(ALT/SGPT) 


 


 20 U/L (14-59) 





 


Alkaline Phosphatase


 


 


 95 U/L


()


 


Total Protein


 


 


 4.1 g/dL


(6.4-8.2)


 


Albumin


 


 


 1.8 g/dL


(3.4-5.0)


 


Albumin/Globulin Ratio   0.8 (1.0-1.7) 








Micro





Microbiology


3/24/20 Blood Culture - Final, Complete


          NO GROWTH AFTER 5 DAYS





Objective


Assessment


Leukocytosis - - ? reactive - trouble with CRRT/S/p PRBCs ? intra-abdominal 


Fever - better - Flu neg antigen 3/26 ? reactive with pancreatitis vs ID - C-

diff neg. S/p HD cath change with malfunction 3/25 - cults 3/24 neg


Lung opacities, COVID-19 neg 


Hypotension 


Acute pancreatitis, early developing necrosis -U/S 3/26 reviewed


JED,Hyperkalemia, Metabolic acidosis on CRRT


   - s/p RIJ temporary dialysis catheter replacement, 3/25


Anasarca - worse


Acute hypoxic resp failure, intubated


? developing peripheral ischemia - better


Cholelithiasis


Anemia - S/p PRBC 3/26


Hypocalcemia 


Prediabetes


HTN





Plan


Plan of Care


Continue Dapto/cefepime (3/25), Flagyl and micafungin (3/23) 


   -Previously on Merrem, Zyvox 


F/u Blood cults 3/24 so far neg





No surgical plans at this time


Maintain aspiration precautions 


Airborne isolation d/c'd. COVID-19 neg  


Repeat CBC





 ct chest, abd and pelvis,,, noted


d/w GI


D/w nursing





Critically ill











LINN FRANZ MD                Apr 3, 2020 08:20

## 2020-04-03 NOTE — RAD
Replacement of a right internal jugular temporary dialysis catheter over a

guidewire 3/25/2020



INDICATION: Pre-existing catheter nonfunctional



Discussion 

 The procedure was explained in its entirety to the patient or the patients

designated representative by a member of the treatment team, including a

discussion of the risks, benefits and commonly accepted alternatives to the

procedure, as well as the expected consequences of no therapy whatsoever.  

Discussion of the risks included, but was not limited to, those that are most

frequent and those that are rare but possibly severe or life-threatening, as

well as the possibility of unforeseen complications.



  All elements of maximal sterile barrier technique including the use of a

cap, mask, sterile gown, sterile gloves, large sterile sheet, appropriate hand

hygiene, and 2% chlorhexidine for cutaneous antisepsis (or acceptable

alternative antiseptic per current guidelines) were followed for this

procedure.



The pre-existing catheter was removed over a guidewire and replaced with a 15

cm temporary dialysis catheter which was found to flush and aspirate normally.

Catheter was flushed, secured in place, and sterile dressings were applied.



Impression: Replacement of a right internal jugular temporary dialysis

catheter over a guidewire

## 2020-04-03 NOTE — NUR
Pharmacy TPN Dosing Note



S: JESENIA RICH is a 49 year old F currently receiving Central Continuous TPN started 
03/18/20



B:Pertinent PMH: Necrotizing pancreatitis



Height: 5 feet, 8 inches

Weight: 112.5 kg



Current diet: NPO 



LABS:

Sodium:    141 

Potassium: 3.8 

Chloride:  107 

Calcium:   8.0 

Corrected Calcium: 9.28 

Magnesium: 2.1 

CO2:       20 

SCr:       2 

Glucose:   212, 159 

Albumin:   2.4 

AST:       46 

ALT:       20 



TPN FORMULA:

TPN TYPE:  Central Continuous

AMINO ACIDS:         125 gm

DEXTROSE:            195 gm

LIPIDS:              40 gm

SODIUM CHLORIDE:     90 mEq

POTASSIUM CHLORIDE:  15 mEq

POTASSIUM PHOSPHATE: 10 mmol

MAGNESIUM:           8 mEq

CALCIUM:             15 mEq

INSULIN:             25 units

MULTIPLE VITAMIN:    10 ml

TRACE ELEMENTS:      0.5 ml



TPN PLAN:  

-HD started today in lieu of CRRT.

-Serum phos trending up, reduce KPhos to 10 mmol/day.

-Serum glucose trending up, increase regular insulin to 25 units/day in TPN.

-BMP, phos tomorrow.





R: Continue TPN @ current rate and with above formula. 

Will monitor electrolytes, glucose, and tolerance to TPN.



 VALERIE SNELL [facility], 04/03/20 [id number]

## 2020-04-03 NOTE — RAD
PORTABLE CHEST 1V 

 

INDICATION: Respiratory failure. 

 

COMPARISON STUDY: 4/2/2020.

 

FINDINGS:

 

Life Support Devices: Stable endotracheal tube, enteric tube, right PICC, 

right IJ dual-lumen catheter, left IJ central venous catheter.

 

Lungs: Low lung volume. Stable bilateral hazy opacities. Right middle lobe

atelectasis.

 

Pleura: Small to moderate pleural effusions.

 

Heart and Mediastinum: Stable cardiomediastinal silhouette and great 

vessels. 

 

IMPRESSION:  

1. Small-to-moderate pleural effusions with diffuse hazy opacities.

2. Stable left support devices.

 

Electronically signed by: Anival Moody MD (4/3/2020 7:34 AM) [id number]

## 2020-04-03 NOTE — PDOC
Renal-Progress Notes


Subjective Notes


Notes


ON THE VENT





History of Present Illness


Hx of present illness


NO CHANGES





Vitals


Vitals





Vital Signs








  Date Time  Temp Pulse Resp B/P (MAP) Pulse Ox O2 Delivery O2 Flow Rate FiO2


 


4/3/20 12:23      Mechanical Ventilator  


 


4/3/20 12:00  77 25 93/61 (72) 99   


 


4/3/20 07:00 99.6       





 99.6       


 


4/3/20 05:12       6.0 








Weight


Weight [ ]





I.O.


Intake and Output











Intake and Output 


 


 4/3/20





 07:00


 


Intake Total 2180 ml


 


Output Total 1330 ml


 


Balance 850 ml


 


 


 


IV Total 2180 ml


 


Output Urine Total 980 ml


 


Gastric Drainage Total 350 ml


 


# Bowel Movements 50











Labs


Labs





Laboratory Tests








Test


 4/2/20


17:16 4/3/20


05:51 4/3/20


06:00 4/3/20


13:11


 


Glucose (Fingerstick)


 211 mg/dL


(70-99) 209 mg/dL


(70-99) 


 159 mg/dL


(70-99)


 


White Blood Count


 


 


 15.7 x10^3/uL


(4.0-11.0) 





 


Red Blood Count


 


 


 2.57 x10^6/uL


(3.50-5.40) 





 


Hemoglobin


 


 


 8.2 g/dL


(12.0-15.5) 





 


Hematocrit


 


 


 25.8 %


(36.0-47.0) 





 


Mean Corpuscular Volume


 


 


 101 fL


() 





 


Mean Corpuscular Hemoglobin   32 pg (25-35)  


 


Mean Corpuscular Hemoglobin


Concent 


 


 32 g/dL


(31-37) 





 


Red Cell Distribution Width


 


 


 18.9 %


(11.5-14.5) 





 


Platelet Count


 


 


 233 x10^3/uL


(140-400) 





 


Neutrophils (%) (Auto)   83 % (31-73)  


 


Lymphocytes (%) (Auto)   7 % (24-48)  


 


Monocytes (%) (Auto)   8 % (0-9)  


 


Eosinophils (%) (Auto)   2 % (0-3)  


 


Basophils (%) (Auto)   0 % (0-3)  


 


Neutrophils # (Auto)


 


 


 13.0 x10^3/uL


(1.8-7.7) 





 


Lymphocytes # (Auto)


 


 


 1.1 x10^3/uL


(1.0-4.8) 





 


Monocytes # (Auto)


 


 


 1.2 x10^3/uL


(0.0-1.1) 





 


Eosinophils # (Auto)


 


 


 0.4 x10^3/uL


(0.0-0.7) 





 


Basophils # (Auto)


 


 


 0.1 x10^3/uL


(0.0-0.2) 





 


Segmented Neutrophils %   58 % (35-66)  


 


Band Neutrophils %   19 % (0-9)  


 


Lymphocytes %   13 % (24-48)  


 


Monocytes %   4 % (0-10)  


 


Eosinophils %   4 % (0-5)  


 


Myelocytes %   2 % (0-0)  


 


Nucleated Red Blood Cells   2  


 


Platelet Estimate


 


 


 Adequate


(ADEQUATE) 





 


Giant Platelets   Occ  


 


Anisocytosis   Slight  


 


Sodium Level


 


 


 141 mmol/L


(136-145) 





 


Potassium Level


 


 


 3.8 mmol/L


(3.5-5.1) 





 


Chloride Level


 


 


 107 mmol/L


() 





 


Carbon Dioxide Level


 


 


 20 mmol/L


(21-32) 





 


Anion Gap   14 (6-14)  


 


Blood Urea Nitrogen


 


 


 63 mg/dL


(7-20) 





 


Creatinine


 


 


 2.0 mg/dL


(0.6-1.0) 





 


Estimated GFR


(Cockcroft-Gault) 


 


 26.5 


 





 


BUN/Creatinine Ratio   32 (6-20)  


 


Glucose Level


 


 


 212 mg/dL


(70-99) 





 


Calcium Level


 


 


 8.0 mg/dL


(8.5-10.1) 





 


Phosphorus Level


 


 


 4.7 mg/dL


(2.6-4.7) 





 


Magnesium Level


 


 


 2.1 mg/dL


(1.8-2.4) 





 


Total Bilirubin


 


 


 0.9 mg/dL


(0.2-1.0) 





 


Aspartate Amino Transf


(AST/SGOT) 


 


 46 U/L (15-37) 


 





 


Alanine Aminotransferase


(ALT/SGPT) 


 


 20 U/L (14-59) 


 





 


Alkaline Phosphatase


 


 


 95 U/L


() 





 


Total Protein


 


 


 4.1 g/dL


(6.4-8.2) 





 


Albumin


 


 


 1.8 g/dL


(3.4-5.0) 





 


Albumin/Globulin Ratio   0.8 (1.0-1.7)  


 


Lipase


 


 


 823 U/L


() 














Micro


Micro





Microbiology


3/24/20 Blood Culture - Final, Complete


          NO GROWTH AFTER 5 DAYS





Review of Systems


Constitutional:  yes: other (ON THE VENT)





Physical Exam


General Appearance:  other (ON THE VENT)


Skin:  warm


Respiratory:  decreased breath sounds


Heart:  S1S2


Abdomen:  soft, bowel sounds present


Genitourinary:  bladder flat


Extremities:  pulses present, edema


Neurology:  other (SEDATED)





Assessment


Assessment


IMP





[id number] CRRT


ANEMIA


LEUCOCYTOSIS-IMPROVING


ANASARCA DUE TO 3RD SPACING


HYPERKALEMIA-RESOLVED


ACIDOSIS AND ACIDEMIA-CONTROLLED


ACUTE REPS FAILURE


ACUTE PANCREATITIS


HYPOALBUMINEMIA


HYPOCALCEMIA-FROM SAPONIFICATION-BETTER


POOR HD CATHETER FUNCTION





PLAN





TPN TO CONTINUE


PRBC AS NEEDED


TREAT LOW CA ONLY FOR ARRHYTHMIA OR SYMPTOMS


CONT YOLI


OFF CRRT


ATTEMPT TO FORCE DIURESE


IHD TODAY AND DAILY FOR NOW


ATTEMPT UF OF 3.5-4.0 LITERS


VENT SUPPORT


PRESSORS AS NEEDED


ANTIBIOTICS


CCT 31


WILL FOLLOW


VERY POOR PROGNOSIS











BETH HEIN MD                  Apr 3, 2020 13:35

## 2020-04-03 NOTE — PDOC
PULMONARY PROGRESS NOTES


Subjective





Patient intubated on 3/23 , sedated


Currently on assist control ventilation  450 tidal volume 8 of PEEP , 40%FIO2 


fevers


Vitals





Vital Signs








  Date Time  Temp Pulse Resp B/P (MAP) Pulse Ox O2 Delivery O2 Flow Rate FiO2


 


4/3/20 07:00 99.6 73 25 92/53 (66) 99 Ventilator  





 99.6       


 


4/3/20 05:12       6.0 








Comments


intubated , sedated


Lungs:  Other (decrease bs)


Abdomen:  Other (distended)


Extremities:  Other (trace edema)


Labs





Laboratory Tests








Test


 4/1/20


11:29 4/1/20


11:30 4/1/20


16:00 4/2/20


00:01


 


Glucose (Fingerstick)


 163 mg/dL


(70-99) 


 


 





 


Sodium Level


 


 137 mmol/L


(136-145) 137 mmol/L


(136-145) 138 mmol/L


(136-145)


 


Potassium Level


 


 3.7 mmol/L


(3.5-5.1) 3.9 mmol/L


(3.5-5.1) 3.8 mmol/L


(3.5-5.1)


 


Chloride Level


 


 104 mmol/L


() 104 mmol/L


() 105 mmol/L


()


 


Carbon Dioxide Level


 


 29 mmol/L


(21-32) 28 mmol/L


(21-32) 27 mmol/L


(21-32)


 


Anion Gap  4 (6-14)  5 (6-14)  6 (6-14) 


 


Blood Urea Nitrogen


 


 31 mg/dL


(7-20) 30 mg/dL


(7-20) 33 mg/dL


(7-20)


 


Creatinine


 


 1.1 mg/dL


(0.6-1.0) 1.1 mg/dL


(0.6-1.0) 1.2 mg/dL


(0.6-1.0)


 


Estimated GFR


(Cockcroft-Gault) 


 52.8 


 52.8 


 47.7 





 


Glucose Level


 


 165 mg/dL


(70-99) 170 mg/dL


(70-99) 173 mg/dL


(70-99)


 


Calcium Level


 


 7.2 mg/dL


(8.5-10.1) 8.2 mg/dL


(8.5-10.1) 8.0 mg/dL


(8.5-10.1)


 


Phosphorus Level


 


 2.9 mg/dL


(2.6-4.7) 2.9 mg/dL


(2.6-4.7) 3.2 mg/dL


(2.6-4.7)


 


Magnesium Level


 


 2.0 mg/dL


(1.8-2.4) 2.3 mg/dL


(1.8-2.4) 2.0 mg/dL


(1.8-2.4)


 


White Blood Count


 


 


 22.6 x10^3/uL


(4.0-11.0) 





 


Red Blood Count


 


 


 2.75 x10^6/uL


(3.50-5.40) 





 


Hemoglobin


 


 


 8.9 g/dL


(12.0-15.5) 





 


Hematocrit


 


 


 26.9 %


(36.0-47.0) 





 


Mean Corpuscular Volume   98 fL ()  


 


Mean Corpuscular Hemoglobin   32 pg (25-35)  


 


Mean Corpuscular Hemoglobin


Concent 


 


 33 g/dL


(31-37) 





 


Red Cell Distribution Width


 


 


 17.1 %


(11.5-14.5) 





 


Platelet Count


 


 


 257 x10^3/uL


(140-400) 





 


Neutrophils (%) (Auto)   82 % (31-73)  


 


Lymphocytes (%) (Auto)   10 % (24-48)  


 


Monocytes (%) (Auto)   6 % (0-9)  


 


Eosinophils (%) (Auto)   2 % (0-3)  


 


Basophils (%) (Auto)   1 % (0-3)  


 


Neutrophils # (Auto)


 


 


 18.5 x10^3/uL


(1.8-7.7) 





 


Lymphocytes # (Auto)


 


 


 2.2 x10^3/uL


(1.0-4.8) 





 


Monocytes # (Auto)


 


 


 1.3 x10^3/uL


(0.0-1.1) 





 


Eosinophils # (Auto)


 


 


 0.5 x10^3/uL


(0.0-0.7) 





 


Basophils # (Auto)


 


 


 0.1 x10^3/uL


(0.0-0.2) 





 


Test


 4/2/20


05:30 4/2/20


05:48 4/2/20


08:00 4/2/20


17:16


 


Sodium Level


 138 mmol/L


(136-145) 


 


 





 


Potassium Level


 3.7 mmol/L


(3.5-5.1) 


 


 





 


Chloride Level


 105 mmol/L


() 


 


 





 


Carbon Dioxide Level


 25 mmol/L


(21-32) 


 


 





 


Anion Gap 8 (6-14)    


 


Blood Urea Nitrogen


 38 mg/dL


(7-20) 


 


 





 


Creatinine


 1.3 mg/dL


(0.6-1.0) 


 


 





 


Estimated GFR


(Cockcroft-Gault) 43.5 


 


 


 





 


Glucose Level


 180 mg/dL


(70-99) 


 


 





 


Calcium Level


 8.0 mg/dL


(8.5-10.1) 


 


 





 


Phosphorus Level


 3.1 mg/dL


(2.6-4.7) 


 


 





 


Magnesium Level


 2.0 mg/dL


(1.8-2.4) 


 


 





 


Glucose (Fingerstick)


 


 168 mg/dL


(70-99) 


 211 mg/dL


(70-99)


 


O2 Saturation   91 % (92-99)  


 


Arterial Blood pH


 


 


 7.43


(7.35-7.45) 





 


Arterial Blood pCO2 at


Patient Temp 


 


 31 mmHg


(35-46) 





 


Arterial Blood pO2 at Patient


Temp 


 


 65 mmHg


() 





 


Arterial Blood HCO3


 


 


 20 mmol/L


(21-28) 





 


Arterial Blood Base Excess


 


 


 -3 mmol/L


(-3-3) 





 


FiO2   30  


 


Test


 4/3/20


05:51 4/3/20


06:00 


 





 


Glucose (Fingerstick)


 209 mg/dL


(70-99) 


 


 





 


White Blood Count


 


 15.7 x10^3/uL


(4.0-11.0) 


 





 


Red Blood Count


 


 2.57 x10^6/uL


(3.50-5.40) 


 





 


Hemoglobin


 


 8.2 g/dL


(12.0-15.5) 


 





 


Hematocrit


 


 25.8 %


(36.0-47.0) 


 





 


Mean Corpuscular Volume


 


 101 fL


() 


 





 


Mean Corpuscular Hemoglobin  32 pg (25-35)   


 


Mean Corpuscular Hemoglobin


Concent 


 32 g/dL


(31-37) 


 





 


Red Cell Distribution Width


 


 18.9 %


(11.5-14.5) 


 





 


Platelet Count


 


 233 x10^3/uL


(140-400) 


 





 


Neutrophils (%) (Auto)  83 % (31-73)   


 


Lymphocytes (%) (Auto)  7 % (24-48)   


 


Monocytes (%) (Auto)  8 % (0-9)   


 


Eosinophils (%) (Auto)  2 % (0-3)   


 


Basophils (%) (Auto)  0 % (0-3)   


 


Neutrophils # (Auto)


 


 13.0 x10^3/uL


(1.8-7.7) 


 





 


Lymphocytes # (Auto)


 


 1.1 x10^3/uL


(1.0-4.8) 


 





 


Monocytes # (Auto)


 


 1.2 x10^3/uL


(0.0-1.1) 


 





 


Eosinophils # (Auto)


 


 0.4 x10^3/uL


(0.0-0.7) 


 





 


Basophils # (Auto)


 


 0.1 x10^3/uL


(0.0-0.2) 


 





 


Sodium Level


 


 141 mmol/L


(136-145) 


 





 


Potassium Level


 


 3.8 mmol/L


(3.5-5.1) 


 





 


Chloride Level


 


 107 mmol/L


() 


 





 


Carbon Dioxide Level


 


 20 mmol/L


(21-32) 


 





 


Anion Gap  14 (6-14)   


 


Blood Urea Nitrogen


 


 63 mg/dL


(7-20) 


 





 


Creatinine


 


 2.0 mg/dL


(0.6-1.0) 


 





 


Estimated GFR


(Cockcroft-Gault) 


 26.5 


 


 





 


BUN/Creatinine Ratio  32 (6-20)   


 


Glucose Level


 


 212 mg/dL


(70-99) 


 





 


Calcium Level


 


 8.0 mg/dL


(8.5-10.1) 


 





 


Phosphorus Level


 


 4.7 mg/dL


(2.6-4.7) 


 





 


Magnesium Level


 


 2.1 mg/dL


(1.8-2.4) 


 





 


Total Bilirubin


 


 0.9 mg/dL


(0.2-1.0) 


 





 


Aspartate Amino Transf


(AST/SGOT) 


 46 U/L (15-37) 


 


 





 


Alanine Aminotransferase


(ALT/SGPT) 


 20 U/L (14-59) 


 


 





 


Alkaline Phosphatase


 


 95 U/L


() 


 





 


Total Protein


 


 4.1 g/dL


(6.4-8.2) 


 





 


Albumin


 


 1.8 g/dL


(3.4-5.0) 


 





 


Albumin/Globulin Ratio  0.8 (1.0-1.7)   








Laboratory Tests








Test


 4/2/20


17:16 4/3/20


05:51 4/3/20


06:00


 


Glucose (Fingerstick)


 211 mg/dL


(70-99) 209 mg/dL


(70-99) 





 


White Blood Count


 


 


 15.7 x10^3/uL


(4.0-11.0)


 


Red Blood Count


 


 


 2.57 x10^6/uL


(3.50-5.40)


 


Hemoglobin


 


 


 8.2 g/dL


(12.0-15.5)


 


Hematocrit


 


 


 25.8 %


(36.0-47.0)


 


Mean Corpuscular Volume


 


 


 101 fL


()


 


Mean Corpuscular Hemoglobin   32 pg (25-35) 


 


Mean Corpuscular Hemoglobin


Concent 


 


 32 g/dL


(31-37)


 


Red Cell Distribution Width


 


 


 18.9 %


(11.5-14.5)


 


Platelet Count


 


 


 233 x10^3/uL


(140-400)


 


Neutrophils (%) (Auto)   83 % (31-73) 


 


Lymphocytes (%) (Auto)   7 % (24-48) 


 


Monocytes (%) (Auto)   8 % (0-9) 


 


Eosinophils (%) (Auto)   2 % (0-3) 


 


Basophils (%) (Auto)   0 % (0-3) 


 


Neutrophils # (Auto)


 


 


 13.0 x10^3/uL


(1.8-7.7)


 


Lymphocytes # (Auto)


 


 


 1.1 x10^3/uL


(1.0-4.8)


 


Monocytes # (Auto)


 


 


 1.2 x10^3/uL


(0.0-1.1)


 


Eosinophils # (Auto)


 


 


 0.4 x10^3/uL


(0.0-0.7)


 


Basophils # (Auto)


 


 


 0.1 x10^3/uL


(0.0-0.2)


 


Sodium Level


 


 


 141 mmol/L


(136-145)


 


Potassium Level


 


 


 3.8 mmol/L


(3.5-5.1)


 


Chloride Level


 


 


 107 mmol/L


()


 


Carbon Dioxide Level


 


 


 20 mmol/L


(21-32)


 


Anion Gap   14 (6-14) 


 


Blood Urea Nitrogen


 


 


 63 mg/dL


(7-20)


 


Creatinine


 


 


 2.0 mg/dL


(0.6-1.0)


 


Estimated GFR


(Cockcroft-Gault) 


 


 26.5 





 


BUN/Creatinine Ratio   32 (6-20) 


 


Glucose Level


 


 


 212 mg/dL


(70-99)


 


Calcium Level


 


 


 8.0 mg/dL


(8.5-10.1)


 


Phosphorus Level


 


 


 4.7 mg/dL


(2.6-4.7)


 


Magnesium Level


 


 


 2.1 mg/dL


(1.8-2.4)


 


Total Bilirubin


 


 


 0.9 mg/dL


(0.2-1.0)


 


Aspartate Amino Transf


(AST/SGOT) 


 


 46 U/L (15-37) 





 


Alanine Aminotransferase


(ALT/SGPT) 


 


 20 U/L (14-59) 





 


Alkaline Phosphatase


 


 


 95 U/L


()


 


Total Protein


 


 


 4.1 g/dL


(6.4-8.2)


 


Albumin


 


 


 1.8 g/dL


(3.4-5.0)


 


Albumin/Globulin Ratio   0.8 (1.0-1.7) 








Medications





Active Scripts








 Medications  Dose


 Route/Sig


 Max Daily Dose Days Date Category


 


 Bisoprolol


 Fumarate 5 Mg


 Tablet  10 Mg


 PO DAILY


   3/16/20 Reported








Comments


Chest x-ray reviewed 4/2, poor ins film, basal effusions and volume loss


CT chest 3/30


reviewed





Impression


.


IMPRESSION:


1.  Acute hypoxemic respiratory failure secondary to ARDS due to acute 

pancreatitis, septic shock, abdominal distention, and pneumonia.and pleural 

effusions.  Pleural effusions secondary to abdominal process and/or general 

volume overload from renal failure.


2.  Gallstone pancreatitis. WITH NECROSIS


3.  Severe metabolic acidosis.stable


4.  Acute kidney injury. ON CRRT


5.  Acute gallstone pancreatitis.


6.  Hypoalbuminemia.


7.  Hypocalcemia.


8.  Leukocytosis


9.  Chronic anemia


10. Covid 19 testing negative


11. Acute /chronic anemia suspected hemorrhagic fluid in pelvis


12, Fever per ID





Plan


.


AC mode, no change in current FIO2/ PEEP


wean versed slowly, Precedex if needed


Not ready for trial, still fevers


Cont AC mode , 40 FIO2/  PEEP.  


Transfuse prn, check H/H 


Not a surgical candidate per surgery


supportive care


CRRT


Antibiotics per ID, may need lines changed


Follow nephrology input


Nutritional support with TPN


Prognosis is guarded


d/w RN/RT


no intervention for effusions, increase UF with HD . d/w DR Gordillo 4/2











SHARYN SOLORZANO MD                  Apr 3, 2020 08:05

## 2020-04-03 NOTE — PDOC
ASURAV NASH APRN 4/3/20 1321:


SURGICAL PROGRESS NOTE


Subjective


d/w nursing


bilious drainage around mouth


Vital Signs





Vital Signs








  Date Time  Temp Pulse Resp B/P (MAP) Pulse Ox O2 Delivery O2 Flow Rate FiO2


 


4/3/20 12:23      Mechanical Ventilator  


 


4/3/20 12:00  77 25 93/61 (72) 99   


 


4/3/20 07:00 99.6       





 99.6       


 


4/3/20 05:12       6.0 








I&O











Intake and Output0 


 


 4/3/20





 07:00


 


Intake Total 2180 ml


 


Output Total 1330 ml


 


Balance 850 ml


 


 


 


IV Total 2180 ml


 


Output Urine Total 980 ml


 


Gastric Drainage Total 350 ml


 


# Bowel Movements 50








HEENT:  Other (vent, og)


Abdomen:  Other (distended, softer)


Labs





Laboratory Tests








Test


 4/1/20


16:00 4/2/20


00:01 4/2/20


05:30 4/2/20


05:48


 


White Blood Count


 22.6 x10^3/uL


(4.0-11.0) 


 


 





 


Red Blood Count


 2.75 x10^6/uL


(3.50-5.40) 


 


 





 


Hemoglobin


 8.9 g/dL


(12.0-15.5) 


 


 





 


Hematocrit


 26.9 %


(36.0-47.0) 


 


 





 


Mean Corpuscular Volume 98 fL ()    


 


Mean Corpuscular Hemoglobin 32 pg (25-35)    


 


Mean Corpuscular Hemoglobin


Concent 33 g/dL


(31-37) 


 


 





 


Red Cell Distribution Width


 17.1 %


(11.5-14.5) 


 


 





 


Platelet Count


 257 x10^3/uL


(140-400) 


 


 





 


Neutrophils (%) (Auto) 82 % (31-73)    


 


Lymphocytes (%) (Auto) 10 % (24-48)    


 


Monocytes (%) (Auto) 6 % (0-9)    


 


Eosinophils (%) (Auto) 2 % (0-3)    


 


Basophils (%) (Auto) 1 % (0-3)    


 


Neutrophils # (Auto)


 18.5 x10^3/uL


(1.8-7.7) 


 


 





 


Lymphocytes # (Auto)


 2.2 x10^3/uL


(1.0-4.8) 


 


 





 


Monocytes # (Auto)


 1.3 x10^3/uL


(0.0-1.1) 


 


 





 


Eosinophils # (Auto)


 0.5 x10^3/uL


(0.0-0.7) 


 


 





 


Basophils # (Auto)


 0.1 x10^3/uL


(0.0-0.2) 


 


 





 


Sodium Level


 137 mmol/L


(136-145) 138 mmol/L


(136-145) 138 mmol/L


(136-145) 





 


Potassium Level


 3.9 mmol/L


(3.5-5.1) 3.8 mmol/L


(3.5-5.1) 3.7 mmol/L


(3.5-5.1) 





 


Chloride Level


 104 mmol/L


() 105 mmol/L


() 105 mmol/L


() 





 


Carbon Dioxide Level


 28 mmol/L


(21-32) 27 mmol/L


(21-32) 25 mmol/L


(21-32) 





 


Anion Gap 5 (6-14)  6 (6-14)  8 (6-14)  


 


Blood Urea Nitrogen


 30 mg/dL


(7-20) 33 mg/dL


(7-20) 38 mg/dL


(7-20) 





 


Creatinine


 1.1 mg/dL


(0.6-1.0) 1.2 mg/dL


(0.6-1.0) 1.3 mg/dL


(0.6-1.0) 





 


Estimated GFR


(Cockcroft-Gault) 52.8 


 47.7 


 43.5 


 





 


Glucose Level


 170 mg/dL


(70-99) 173 mg/dL


(70-99) 180 mg/dL


(70-99) 





 


Calcium Level


 8.2 mg/dL


(8.5-10.1) 8.0 mg/dL


(8.5-10.1) 8.0 mg/dL


(8.5-10.1) 





 


Phosphorus Level


 2.9 mg/dL


(2.6-4.7) 3.2 mg/dL


(2.6-4.7) 3.1 mg/dL


(2.6-4.7) 





 


Magnesium Level


 2.3 mg/dL


(1.8-2.4) 2.0 mg/dL


(1.8-2.4) 2.0 mg/dL


(1.8-2.4) 





 


Glucose (Fingerstick)


 


 


 


 168 mg/dL


(70-99)


 


Test


 4/2/20


08:00 4/2/20


17:16 4/3/20


05:51 4/3/20


06:00


 


O2 Saturation 91 % (92-99)    


 


Arterial Blood pH


 7.43


(7.35-7.45) 


 


 





 


Arterial Blood pCO2 at


Patient Temp 31 mmHg


(35-46) 


 


 





 


Arterial Blood pO2 at Patient


Temp 65 mmHg


() 


 


 





 


Arterial Blood HCO3


 20 mmol/L


(21-28) 


 


 





 


Arterial Blood Base Excess


 -3 mmol/L


(-3-3) 


 


 





 


FiO2 30    


 


Glucose (Fingerstick)


 


 211 mg/dL


(70-99) 209 mg/dL


(70-99) 





 


White Blood Count


 


 


 


 15.7 x10^3/uL


(4.0-11.0)


 


Red Blood Count


 


 


 


 2.57 x10^6/uL


(3.50-5.40)


 


Hemoglobin


 


 


 


 8.2 g/dL


(12.0-15.5)


 


Hematocrit


 


 


 


 25.8 %


(36.0-47.0)


 


Mean Corpuscular Volume


 


 


 


 101 fL


()


 


Mean Corpuscular Hemoglobin    32 pg (25-35) 


 


Mean Corpuscular Hemoglobin


Concent 


 


 


 32 g/dL


(31-37)


 


Red Cell Distribution Width


 


 


 


 18.9 %


(11.5-14.5)


 


Platelet Count


 


 


 


 233 x10^3/uL


(140-400)


 


Neutrophils (%) (Auto)    83 % (31-73) 


 


Lymphocytes (%) (Auto)    7 % (24-48) 


 


Monocytes (%) (Auto)    8 % (0-9) 


 


Eosinophils (%) (Auto)    2 % (0-3) 


 


Basophils (%) (Auto)    0 % (0-3) 


 


Neutrophils # (Auto)


 


 


 


 13.0 x10^3/uL


(1.8-7.7)


 


Lymphocytes # (Auto)


 


 


 


 1.1 x10^3/uL


(1.0-4.8)


 


Monocytes # (Auto)


 


 


 


 1.2 x10^3/uL


(0.0-1.1)


 


Eosinophils # (Auto)


 


 


 


 0.4 x10^3/uL


(0.0-0.7)


 


Basophils # (Auto)


 


 


 


 0.1 x10^3/uL


(0.0-0.2)


 


Segmented Neutrophils %    58 % (35-66) 


 


Band Neutrophils %    19 % (0-9) 


 


Lymphocytes %    13 % (24-48) 


 


Monocytes %    4 % (0-10) 


 


Eosinophils %    4 % (0-5) 


 


Myelocytes %    2 % (0-0) 


 


Nucleated Red Blood Cells    2 


 


Platelet Estimate


 


 


 


 Adequate


(ADEQUATE)


 


Giant Platelets    Occ 


 


Anisocytosis    Slight 


 


Sodium Level


 


 


 


 141 mmol/L


(136-145)


 


Potassium Level


 


 


 


 3.8 mmol/L


(3.5-5.1)


 


Chloride Level


 


 


 


 107 mmol/L


()


 


Carbon Dioxide Level


 


 


 


 20 mmol/L


(21-32)


 


Anion Gap    14 (6-14) 


 


Blood Urea Nitrogen


 


 


 


 63 mg/dL


(7-20)


 


Creatinine


 


 


 


 2.0 mg/dL


(0.6-1.0)


 


Estimated GFR


(Cockcroft-Gault) 


 


 


 26.5 





 


BUN/Creatinine Ratio    32 (6-20) 


 


Glucose Level


 


 


 


 212 mg/dL


(70-99)


 


Calcium Level


 


 


 


 8.0 mg/dL


(8.5-10.1)


 


Phosphorus Level


 


 


 


 4.7 mg/dL


(2.6-4.7)


 


Magnesium Level


 


 


 


 2.1 mg/dL


(1.8-2.4)


 


Total Bilirubin


 


 


 


 0.9 mg/dL


(0.2-1.0)


 


Aspartate Amino Transf


(AST/SGOT) 


 


 


 46 U/L (15-37) 





 


Alanine Aminotransferase


(ALT/SGPT) 


 


 


 20 U/L (14-59) 





 


Alkaline Phosphatase


 


 


 


 95 U/L


()


 


Total Protein


 


 


 


 4.1 g/dL


(6.4-8.2)


 


Albumin


 


 


 


 1.8 g/dL


(3.4-5.0)


 


Albumin/Globulin Ratio    0.8 (1.0-1.7) 


 


Lipase


 


 


 


 823 U/L


()


 


Test


 4/3/20


13:11 


 


 





 


Glucose (Fingerstick)


 159 mg/dL


(70-99) 


 


 











Laboratory Tests








Test


 4/2/20


17:16 4/3/20


05:51 4/3/20


06:00 4/3/20


13:11


 


Glucose (Fingerstick)


 211 mg/dL


(70-99) 209 mg/dL


(70-99) 


 159 mg/dL


(70-99)


 


White Blood Count


 


 


 15.7 x10^3/uL


(4.0-11.0) 





 


Red Blood Count


 


 


 2.57 x10^6/uL


(3.50-5.40) 





 


Hemoglobin


 


 


 8.2 g/dL


(12.0-15.5) 





 


Hematocrit


 


 


 25.8 %


(36.0-47.0) 





 


Mean Corpuscular Volume


 


 


 101 fL


() 





 


Mean Corpuscular Hemoglobin   32 pg (25-35)  


 


Mean Corpuscular Hemoglobin


Concent 


 


 32 g/dL


(31-37) 





 


Red Cell Distribution Width


 


 


 18.9 %


(11.5-14.5) 





 


Platelet Count


 


 


 233 x10^3/uL


(140-400) 





 


Neutrophils (%) (Auto)   83 % (31-73)  


 


Lymphocytes (%) (Auto)   7 % (24-48)  


 


Monocytes (%) (Auto)   8 % (0-9)  


 


Eosinophils (%) (Auto)   2 % (0-3)  


 


Basophils (%) (Auto)   0 % (0-3)  


 


Neutrophils # (Auto)


 


 


 13.0 x10^3/uL


(1.8-7.7) 





 


Lymphocytes # (Auto)


 


 


 1.1 x10^3/uL


(1.0-4.8) 





 


Monocytes # (Auto)


 


 


 1.2 x10^3/uL


(0.0-1.1) 





 


Eosinophils # (Auto)


 


 


 0.4 x10^3/uL


(0.0-0.7) 





 


Basophils # (Auto)


 


 


 0.1 x10^3/uL


(0.0-0.2) 





 


Segmented Neutrophils %   58 % (35-66)  


 


Band Neutrophils %   19 % (0-9)  


 


Lymphocytes %   13 % (24-48)  


 


Monocytes %   4 % (0-10)  


 


Eosinophils %   4 % (0-5)  


 


Myelocytes %   2 % (0-0)  


 


Nucleated Red Blood Cells   2  


 


Platelet Estimate


 


 


 Adequate


(ADEQUATE) 





 


Giant Platelets   Occ  


 


Anisocytosis   Slight  


 


Sodium Level


 


 


 141 mmol/L


(136-145) 





 


Potassium Level


 


 


 3.8 mmol/L


(3.5-5.1) 





 


Chloride Level


 


 


 107 mmol/L


() 





 


Carbon Dioxide Level


 


 


 20 mmol/L


(21-32) 





 


Anion Gap   14 (6-14)  


 


Blood Urea Nitrogen


 


 


 63 mg/dL


(7-20) 





 


Creatinine


 


 


 2.0 mg/dL


(0.6-1.0) 





 


Estimated GFR


(Cockcroft-Gault) 


 


 26.5 


 





 


BUN/Creatinine Ratio   32 (6-20)  


 


Glucose Level


 


 


 212 mg/dL


(70-99) 





 


Calcium Level


 


 


 8.0 mg/dL


(8.5-10.1) 





 


Phosphorus Level


 


 


 4.7 mg/dL


(2.6-4.7) 





 


Magnesium Level


 


 


 2.1 mg/dL


(1.8-2.4) 





 


Total Bilirubin


 


 


 0.9 mg/dL


(0.2-1.0) 





 


Aspartate Amino Transf


(AST/SGOT) 


 


 46 U/L (15-37) 


 





 


Alanine Aminotransferase


(ALT/SGPT) 


 


 20 U/L (14-59) 


 





 


Alkaline Phosphatase


 


 


 95 U/L


() 





 


Total Protein


 


 


 4.1 g/dL


(6.4-8.2) 





 


Albumin


 


 


 1.8 g/dL


(3.4-5.0) 





 


Albumin/Globulin Ratio   0.8 (1.0-1.7)  


 


Lipase


 


 


 823 U/L


() 











Problem List


Problems


Medical Problems:


(1) Acute pancreatitis


Status: Acute  





(2) Cholelithiasis


Status: Acute  








Assessment/Plan


will review with Dr Flores


supportive care





GAMAL FLORES MD 4/3/20 1521:


SURGICAL PROGRESS NOTE


Assessment/Plan


Pt seen and examined.


Agree with Ms. Nash's note


Pt remains reasonably stable


abd soft, NTTP


cont supportive care











SAURAV NASH APRN             Apr 3, 2020 13:21


GAMAL FLORES MD              Apr 3, 2020 15:21

## 2020-04-04 VITALS — SYSTOLIC BLOOD PRESSURE: 111 MMHG | DIASTOLIC BLOOD PRESSURE: 74 MMHG

## 2020-04-04 VITALS — DIASTOLIC BLOOD PRESSURE: 60 MMHG | SYSTOLIC BLOOD PRESSURE: 102 MMHG

## 2020-04-04 VITALS — DIASTOLIC BLOOD PRESSURE: 53 MMHG | SYSTOLIC BLOOD PRESSURE: 87 MMHG

## 2020-04-04 VITALS — SYSTOLIC BLOOD PRESSURE: 100 MMHG | DIASTOLIC BLOOD PRESSURE: 64 MMHG

## 2020-04-04 VITALS — DIASTOLIC BLOOD PRESSURE: 58 MMHG | SYSTOLIC BLOOD PRESSURE: 116 MMHG

## 2020-04-04 VITALS — SYSTOLIC BLOOD PRESSURE: 112 MMHG | DIASTOLIC BLOOD PRESSURE: 67 MMHG

## 2020-04-04 VITALS — SYSTOLIC BLOOD PRESSURE: 111 MMHG | DIASTOLIC BLOOD PRESSURE: 69 MMHG

## 2020-04-04 VITALS — SYSTOLIC BLOOD PRESSURE: 91 MMHG | DIASTOLIC BLOOD PRESSURE: 47 MMHG

## 2020-04-04 VITALS — SYSTOLIC BLOOD PRESSURE: 96 MMHG | DIASTOLIC BLOOD PRESSURE: 58 MMHG

## 2020-04-04 VITALS — SYSTOLIC BLOOD PRESSURE: 96 MMHG | DIASTOLIC BLOOD PRESSURE: 57 MMHG

## 2020-04-04 VITALS — DIASTOLIC BLOOD PRESSURE: 51 MMHG | SYSTOLIC BLOOD PRESSURE: 72 MMHG

## 2020-04-04 VITALS — SYSTOLIC BLOOD PRESSURE: 80 MMHG | DIASTOLIC BLOOD PRESSURE: 53 MMHG

## 2020-04-04 VITALS — DIASTOLIC BLOOD PRESSURE: 66 MMHG | SYSTOLIC BLOOD PRESSURE: 112 MMHG

## 2020-04-04 VITALS — DIASTOLIC BLOOD PRESSURE: 67 MMHG | SYSTOLIC BLOOD PRESSURE: 110 MMHG

## 2020-04-04 VITALS — DIASTOLIC BLOOD PRESSURE: 73 MMHG | SYSTOLIC BLOOD PRESSURE: 111 MMHG

## 2020-04-04 VITALS — SYSTOLIC BLOOD PRESSURE: 109 MMHG | DIASTOLIC BLOOD PRESSURE: 60 MMHG

## 2020-04-04 VITALS — DIASTOLIC BLOOD PRESSURE: 54 MMHG | SYSTOLIC BLOOD PRESSURE: 102 MMHG

## 2020-04-04 VITALS — SYSTOLIC BLOOD PRESSURE: 80 MMHG | DIASTOLIC BLOOD PRESSURE: 52 MMHG

## 2020-04-04 VITALS — DIASTOLIC BLOOD PRESSURE: 51 MMHG | SYSTOLIC BLOOD PRESSURE: 93 MMHG

## 2020-04-04 VITALS — DIASTOLIC BLOOD PRESSURE: 61 MMHG | SYSTOLIC BLOOD PRESSURE: 92 MMHG

## 2020-04-04 LAB
AMORPH SED URNS QL MICRO: PRESENT /HPF
ANION GAP SERPL CALC-SCNC: 7 MMOL/L (ref 6–14)
APTT PPP: (no result) S
BACTERIA #/AREA URNS HPF: 0 /HPF
BASE EXCESS ABG: -1 MMOL/L (ref -3–3)
BILIRUB UR QL STRIP: (no result)
BUN SERPL-MCNC: 54 MG/DL (ref 7–20)
CALCIUM SERPL-MCNC: 8.3 MG/DL (ref 8.5–10.1)
CHLORIDE SERPL-SCNC: 103 MMOL/L (ref 98–107)
CO2 SERPL-SCNC: 25 MMOL/L (ref 21–32)
CREAT SERPL-MCNC: 2 MG/DL (ref 0.6–1)
ERYTHROCYTE [DISTWIDTH] IN BLOOD BY AUTOMATED COUNT: 18.3 % (ref 11.5–14.5)
FIBRINOGEN PPP-MCNC: (no result) MG/DL
GFR SERPLBLD BASED ON 1.73 SQ M-ARVRAT: 26.5 ML/MIN
GLUCOSE SERPL-MCNC: 175 MG/DL (ref 70–99)
HCO3 BLDA-SCNC: 22 MMOL/L (ref 21–28)
HCT VFR BLD CALC: 23.9 % (ref 36–47)
HGB BLD-MCNC: 8 G/DL (ref 12–15.5)
INSPIRATION SETTING TIME VENT: 50
MCH RBC QN AUTO: 33 PG (ref 25–35)
MCHC RBC AUTO-ENTMCNC: 33 G/DL (ref 31–37)
MCV RBC AUTO: 98 FL (ref 79–100)
NITRITE UR QL STRIP: POSITIVE
PCO2 BLDA: 30 MMHG (ref 35–46)
PH UR STRIP: 5 [PH]
PLATELET # BLD AUTO: 209 X10^3/UL (ref 140–400)
PO2 BLDA: 122 MMHG (ref 75–108)
POTASSIUM SERPL-SCNC: 4 MMOL/L (ref 3.5–5.1)
PROT UR STRIP-MCNC: 100 MG/DL
RBC # BLD AUTO: 2.43 X10^6/UL (ref 3.5–5.4)
RBC #/AREA URNS HPF: (no result) /HPF (ref 0–2)
SAO2 % BLDA: 98 % (ref 92–99)
SODIUM SERPL-SCNC: 135 MMOL/L (ref 136–145)
SQUAMOUS #/AREA URNS LPF: (no result) /LPF
UROBILINOGEN UR-MCNC: 0.2 MG/DL
WBC # BLD AUTO: 11.2 X10^3/UL (ref 4–11)
WBC #/AREA URNS HPF: (no result) /HPF (ref 0–4)

## 2020-04-04 RX ADMIN — DAPTOMYCIN SCH MLS/HR: 500 INJECTION, POWDER, LYOPHILIZED, FOR SOLUTION INTRAVENOUS at 15:01

## 2020-04-04 RX ADMIN — INSULIN LISPRO SCH UNITS: 100 INJECTION, SOLUTION INTRAVENOUS; SUBCUTANEOUS at 00:01

## 2020-04-04 RX ADMIN — DEXMEDETOMIDINE HYDROCHLORIDE PRN MLS/HR: 100 INJECTION, SOLUTION, CONCENTRATE INTRAVENOUS at 06:07

## 2020-04-04 RX ADMIN — MIDAZOLAM HYDROCHLORIDE PRN MLS/HR: 5 INJECTION, SOLUTION INTRAMUSCULAR; INTRAVENOUS at 19:54

## 2020-04-04 RX ADMIN — MIDAZOLAM HYDROCHLORIDE PRN MLS/HR: 5 INJECTION, SOLUTION INTRAMUSCULAR; INTRAVENOUS at 06:07

## 2020-04-04 RX ADMIN — DEXMEDETOMIDINE HYDROCHLORIDE PRN MLS/HR: 100 INJECTION, SOLUTION, CONCENTRATE INTRAVENOUS at 02:37

## 2020-04-04 RX ADMIN — HEPARIN SODIUM SCH UNIT: 5000 INJECTION, SOLUTION INTRAVENOUS; SUBCUTANEOUS at 08:57

## 2020-04-04 RX ADMIN — CEFEPIME SCH GM: 2 INJECTION, POWDER, FOR SOLUTION INTRAVENOUS at 08:56

## 2020-04-04 RX ADMIN — DEXTROSE SCH MLS/HR: 50 INJECTION, SOLUTION INTRAVENOUS at 08:55

## 2020-04-04 RX ADMIN — DEXMEDETOMIDINE HYDROCHLORIDE PRN MLS/HR: 100 INJECTION, SOLUTION, CONCENTRATE INTRAVENOUS at 15:25

## 2020-04-04 RX ADMIN — INSULIN LISPRO SCH UNITS: 100 INJECTION, SOLUTION INTRAVENOUS; SUBCUTANEOUS at 05:31

## 2020-04-04 RX ADMIN — INSULIN LISPRO SCH UNITS: 100 INJECTION, SOLUTION INTRAVENOUS; SUBCUTANEOUS at 12:00

## 2020-04-04 RX ADMIN — PANTOPRAZOLE SODIUM SCH MG: 40 INJECTION, POWDER, FOR SOLUTION INTRAVENOUS at 07:33

## 2020-04-04 RX ADMIN — HEPARIN SODIUM SCH UNIT: 5000 INJECTION, SOLUTION INTRAVENOUS; SUBCUTANEOUS at 21:33

## 2020-04-04 RX ADMIN — DEXMEDETOMIDINE HYDROCHLORIDE PRN MLS/HR: 100 INJECTION, SOLUTION, CONCENTRATE INTRAVENOUS at 11:29

## 2020-04-04 RX ADMIN — CEFEPIME SCH GM: 2 INJECTION, POWDER, FOR SOLUTION INTRAVENOUS at 21:32

## 2020-04-04 RX ADMIN — Medication PRN EACH: at 10:28

## 2020-04-04 RX ADMIN — INSULIN LISPRO SCH UNITS: 100 INJECTION, SOLUTION INTRAVENOUS; SUBCUTANEOUS at 19:12

## 2020-04-04 RX ADMIN — INSULIN LISPRO SCH UNITS: 100 INJECTION, SOLUTION INTRAVENOUS; SUBCUTANEOUS at 23:55

## 2020-04-04 RX ADMIN — DEXMEDETOMIDINE HYDROCHLORIDE PRN MLS/HR: 100 INJECTION, SOLUTION, CONCENTRATE INTRAVENOUS at 21:33

## 2020-04-04 NOTE — PDOC
Dialysis Progress Note


Dialysis Note


Dialysis Note


Seen on Hemodialysis, tolerating treatment       Okay  





Vitals on Hemodialysis: 119/76      72      99.1      





 





Chest:    CTA Julian


Heart:    S1 S2


Abdomen -    Soft NTND


Extremities -    No Edema











ARF:   Dialysis as below





F 180 NR 4.0 Hrs





4 K 2.5 Ca 140 Na  35  HC03





Qb 350 + Qd 500+





Heparin  0 Units





Uf 3 Kgs or to dry weight as tolerated





May give 25-50 gms of 25% Albumin if needed to maintain Hemodynamic stability





Treatment plan reviewed and discussed with Dialysis RN





Vitals


Vital Signs





Vital Signs








  Date Time  Temp Pulse Resp B/P (MAP) Pulse Ox O2 Delivery O2 Flow Rate FiO2


 


4/4/20 12:21     99 Ventilator  


 


4/4/20 09:00  86 25 111/74 (86)    


 


4/4/20 08:00 98.4       





 98.4       


 


4/3/20 05:12       6.0 











Labs


Last Labs





Laboratory Tests








Test


 4/2/20


17:16 4/3/20


05:51 4/3/20


06:00 4/3/20


13:11


 


Glucose (Fingerstick)


 211 mg/dL


(70-99) 209 mg/dL


(70-99) 


 159 mg/dL


(70-99)


 


White Blood Count


 


 


 15.7 x10^3/uL


(4.0-11.0) 





 


Red Blood Count


 


 


 2.57 x10^6/uL


(3.50-5.40) 





 


Hemoglobin


 


 


 8.2 g/dL


(12.0-15.5) 





 


Hematocrit


 


 


 25.8 %


(36.0-47.0) 





 


Mean Corpuscular Volume


 


 


 101 fL


() 





 


Mean Corpuscular Hemoglobin   32 pg (25-35)  


 


Mean Corpuscular Hemoglobin


Concent 


 


 32 g/dL


(31-37) 





 


Red Cell Distribution Width


 


 


 18.9 %


(11.5-14.5) 





 


Platelet Count


 


 


 233 x10^3/uL


(140-400) 





 


Neutrophils (%) (Auto)   83 % (31-73)  


 


Lymphocytes (%) (Auto)   7 % (24-48)  


 


Monocytes (%) (Auto)   8 % (0-9)  


 


Eosinophils (%) (Auto)   2 % (0-3)  


 


Basophils (%) (Auto)   0 % (0-3)  


 


Neutrophils # (Auto)


 


 


 13.0 x10^3/uL


(1.8-7.7) 





 


Lymphocytes # (Auto)


 


 


 1.1 x10^3/uL


(1.0-4.8) 





 


Monocytes # (Auto)


 


 


 1.2 x10^3/uL


(0.0-1.1) 





 


Eosinophils # (Auto)


 


 


 0.4 x10^3/uL


(0.0-0.7) 





 


Basophils # (Auto)


 


 


 0.1 x10^3/uL


(0.0-0.2) 





 


Segmented Neutrophils %   58 % (35-66)  


 


Band Neutrophils %   19 % (0-9)  


 


Lymphocytes %   13 % (24-48)  


 


Monocytes %   4 % (0-10)  


 


Eosinophils %   4 % (0-5)  


 


Myelocytes %   2 % (0-0)  


 


Nucleated Red Blood Cells   2  


 


Platelet Estimate


 


 


 Adequate


(ADEQUATE) 





 


Giant Platelets   Occ  


 


Anisocytosis   Slight  


 


Sodium Level


 


 


 141 mmol/L


(136-145) 





 


Potassium Level


 


 


 3.8 mmol/L


(3.5-5.1) 





 


Chloride Level


 


 


 107 mmol/L


() 





 


Carbon Dioxide Level


 


 


 20 mmol/L


(21-32) 





 


Anion Gap   14 (6-14)  


 


Blood Urea Nitrogen


 


 


 63 mg/dL


(7-20) 





 


Creatinine


 


 


 2.0 mg/dL


(0.6-1.0) 





 


Estimated GFR


(Cockcroft-Gault) 


 


 26.5 


 





 


BUN/Creatinine Ratio   32 (6-20)  


 


Glucose Level


 


 


 212 mg/dL


(70-99) 





 


Calcium Level


 


 


 8.0 mg/dL


(8.5-10.1) 





 


Phosphorus Level


 


 


 4.7 mg/dL


(2.6-4.7) 





 


Magnesium Level


 


 


 2.1 mg/dL


(1.8-2.4) 





 


Total Bilirubin


 


 


 0.9 mg/dL


(0.2-1.0) 





 


Aspartate Amino Transf


(AST/SGOT) 


 


 46 U/L (15-37) 


 





 


Alanine Aminotransferase


(ALT/SGPT) 


 


 20 U/L (14-59) 


 





 


Alkaline Phosphatase


 


 


 95 U/L


() 





 


Total Protein


 


 


 4.1 g/dL


(6.4-8.2) 





 


Albumin


 


 


 1.8 g/dL


(3.4-5.0) 





 


Albumin/Globulin Ratio   0.8 (1.0-1.7)  


 


Lipase


 


 


 823 U/L


() 





 


Test


 4/3/20


15:30 4/3/20


16:57 4/3/20


23:58 4/4/20


04:40


 


O2 Saturation 97 % (92-99)    


 


Arterial Blood pH


 7.55


(7.35-7.45) 


 


 





 


Arterial Blood pCO2 at


Patient Temp 27 mmHg


(35-46) 


 


 





 


Arterial Blood pO2 at Patient


Temp 99 mmHg


() 


 


 





 


Arterial Blood HCO3


 23 mmol/L


(21-28) 


 


 





 


Arterial Blood Base Excess


 1 mmol/L


(-3-3) 


 


 





 


FiO2 40    


 


Glucose (Fingerstick)


 


 185 mg/dL


(70-99) 186 mg/dL


(70-99) 





 


White Blood Count


 


 


 


 11.2 x10^3/uL


(4.0-11.0)


 


Red Blood Count


 


 


 


 2.43 x10^6/uL


(3.50-5.40)


 


Hemoglobin


 


 


 


 8.0 g/dL


(12.0-15.5)


 


Hematocrit


 


 


 


 23.9 %


(36.0-47.0)


 


Mean Corpuscular Volume    98 fL () 


 


Mean Corpuscular Hemoglobin    33 pg (25-35) 


 


Mean Corpuscular Hemoglobin


Concent 


 


 


 33 g/dL


(31-37)


 


Red Cell Distribution Width


 


 


 


 18.3 %


(11.5-14.5)


 


Platelet Count


 


 


 


 209 x10^3/uL


(140-400)


 


Sodium Level


 


 


 


 135 mmol/L


(136-145)


 


Potassium Level


 


 


 


 4.0 mmol/L


(3.5-5.1)


 


Chloride Level


 


 


 


 103 mmol/L


()


 


Carbon Dioxide Level


 


 


 


 25 mmol/L


(21-32)


 


Anion Gap    7 (6-14) 


 


Blood Urea Nitrogen


 


 


 


 54 mg/dL


(7-20)


 


Creatinine


 


 


 


 2.0 mg/dL


(0.6-1.0)


 


Estimated GFR


(Cockcroft-Gault) 


 


 


 26.5 





 


Glucose Level


 


 


 


 175 mg/dL


(70-99)


 


Calcium Level


 


 


 


 8.3 mg/dL


(8.5-10.1)


 


Phosphorus Level


 


 


 


 3.9 mg/dL


(2.6-4.7)


 


Test


 4/4/20


05:22 


 


 





 


Glucose (Fingerstick)


 183 mg/dL


(70-99) 


 


 











Laboratory Tests








Test


 4/3/20


15:30 4/3/20


16:57 4/3/20


23:58 4/4/20


04:40


 


O2 Saturation 97 % (92-99)    


 


Arterial Blood pH


 7.55


(7.35-7.45) 


 


 





 


Arterial Blood pCO2 at


Patient Temp 27 mmHg


(35-46) 


 


 





 


Arterial Blood pO2 at Patient


Temp 99 mmHg


() 


 


 





 


Arterial Blood HCO3


 23 mmol/L


(21-28) 


 


 





 


Arterial Blood Base Excess


 1 mmol/L


(-3-3) 


 


 





 


FiO2 40    


 


Glucose (Fingerstick)


 


 185 mg/dL


(70-99) 186 mg/dL


(70-99) 





 


White Blood Count


 


 


 


 11.2 x10^3/uL


(4.0-11.0)


 


Red Blood Count


 


 


 


 2.43 x10^6/uL


(3.50-5.40)


 


Hemoglobin


 


 


 


 8.0 g/dL


(12.0-15.5)


 


Hematocrit


 


 


 


 23.9 %


(36.0-47.0)


 


Mean Corpuscular Volume    98 fL () 


 


Mean Corpuscular Hemoglobin    33 pg (25-35) 


 


Mean Corpuscular Hemoglobin


Concent 


 


 


 33 g/dL


(31-37)


 


Red Cell Distribution Width


 


 


 


 18.3 %


(11.5-14.5)


 


Platelet Count


 


 


 


 209 x10^3/uL


(140-400)


 


Sodium Level


 


 


 


 135 mmol/L


(136-145)


 


Potassium Level


 


 


 


 4.0 mmol/L


(3.5-5.1)


 


Chloride Level


 


 


 


 103 mmol/L


()


 


Carbon Dioxide Level


 


 


 


 25 mmol/L


(21-32)


 


Anion Gap    7 (6-14) 


 


Blood Urea Nitrogen


 


 


 


 54 mg/dL


(7-20)


 


Creatinine


 


 


 


 2.0 mg/dL


(0.6-1.0)


 


Estimated GFR


(Cockcroft-Gault) 


 


 


 26.5 





 


Glucose Level


 


 


 


 175 mg/dL


(70-99)


 


Calcium Level


 


 


 


 8.3 mg/dL


(8.5-10.1)


 


Phosphorus Level


 


 


 


 3.9 mg/dL


(2.6-4.7)


 


Test


 4/4/20


05:22 


 


 





 


Glucose (Fingerstick)


 183 mg/dL


(70-99) 


 


 














Assessment


Assessment


Problems


Medical Problems:


(1) Acute pancreatitis


Status: Acute  





(2) Cholelithiasis


Status: Acute  











Plan


Plan of Care


Problems


Medical Problems:


(1) Acute pancreatitis


Status: Acute  





(2) Cholelithiasis


Status: Acute  

















TIMMY FRANZ MD                Apr 4, 2020 13:14

## 2020-04-04 NOTE — NUR
Pharmacy TPN Dosing Note



S: JESENIA RICH is a 49 year old F Currently receiving Central Continuous TPN started 
03/18/20



B:Pertinent PMH: Necrotizing pancreatitis

Height: 5 feet, 8 inches

Weight: 113.0 kg



Current diet: NPO 



LABS:

Sodium:    135 

Potassium: 4 

Chloride:  103 

Calcium:   8.3 

Corrected Calcium: 10.06 

Magnesium: 2.1 

CO2:       25 

SCr:       2 

Glucose:   175-186 

Albumin:   1.8 

AST:       46 

ALT:       20 



TPN FORMULA:

TPN TYPE:  Central Continuous

AMINO ACIDS:         125 gm

DEXTROSE:            195 gm

LIPIDS:              40 gm

SODIUM CHLORIDE:     90 mEq

POTASSIUM CHLORIDE:  15 mEq

POTASSIUM PHOSPHATE: 10 mmol

MAGNESIUM:           8 mEq

CALCIUM:             15 mEq

INSULIN:             25 units

MULTIPLE VITAMIN:    10 ml

TRACE ELEMENTS:      0.5 ml(s)



TPN PLAN:  

HD daily per nephrology planned.

Phosphorous and BG trending down after changes in TPN yesterday.

Cont same TPN. 

BMP, Phos and Mag ordered for 4/6 in the AM.





R: Continue TPN as yesterday.

Will monitor electrolytes, glucose, and tolerance to TPN.



 LORENZO LESLIE [facility], 04/04/20 2596

## 2020-04-04 NOTE — PDOC
PULMONARY PROGRESS NOTES


Subjective


Patient intubated on 3/23 , sedated


Currently on assist control ventilation  450 tidal volume 8 of PEEP ,40%FIO2 , 


fevers


Vitals





Vital Signs








  Date Time  Temp Pulse Resp B/P (MAP) Pulse Ox O2 Delivery O2 Flow Rate FiO2


 


4/4/20 09:00  86 25 111/74 (86) 98 Ventilator  


 


4/4/20 08:00 98.4       





 98.4       








Comments


intubated , sedated


Visual exam done via tele-medicine


no rash, wounds around nasal folds 


no leg edema


Labs





Laboratory Tests








Test


 4/2/20


17:16 4/3/20


05:51 4/3/20


06:00 4/3/20


13:11


 


Glucose (Fingerstick)


 211 mg/dL


(70-99) 209 mg/dL


(70-99) 


 159 mg/dL


(70-99)


 


White Blood Count


 


 


 15.7 x10^3/uL


(4.0-11.0) 





 


Red Blood Count


 


 


 2.57 x10^6/uL


(3.50-5.40) 





 


Hemoglobin


 


 


 8.2 g/dL


(12.0-15.5) 





 


Hematocrit


 


 


 25.8 %


(36.0-47.0) 





 


Mean Corpuscular Volume


 


 


 101 fL


() 





 


Mean Corpuscular Hemoglobin   32 pg (25-35)  


 


Mean Corpuscular Hemoglobin


Concent 


 


 32 g/dL


(31-37) 





 


Red Cell Distribution Width


 


 


 18.9 %


(11.5-14.5) 





 


Platelet Count


 


 


 233 x10^3/uL


(140-400) 





 


Neutrophils (%) (Auto)   83 % (31-73)  


 


Lymphocytes (%) (Auto)   7 % (24-48)  


 


Monocytes (%) (Auto)   8 % (0-9)  


 


Eosinophils (%) (Auto)   2 % (0-3)  


 


Basophils (%) (Auto)   0 % (0-3)  


 


Neutrophils # (Auto)


 


 


 13.0 x10^3/uL


(1.8-7.7) 





 


Lymphocytes # (Auto)


 


 


 1.1 x10^3/uL


(1.0-4.8) 





 


Monocytes # (Auto)


 


 


 1.2 x10^3/uL


(0.0-1.1) 





 


Eosinophils # (Auto)


 


 


 0.4 x10^3/uL


(0.0-0.7) 





 


Basophils # (Auto)


 


 


 0.1 x10^3/uL


(0.0-0.2) 





 


Segmented Neutrophils %   58 % (35-66)  


 


Band Neutrophils %   19 % (0-9)  


 


Lymphocytes %   13 % (24-48)  


 


Monocytes %   4 % (0-10)  


 


Eosinophils %   4 % (0-5)  


 


Myelocytes %   2 % (0-0)  


 


Nucleated Red Blood Cells   2  


 


Platelet Estimate


 


 


 Adequate


(ADEQUATE) 





 


Giant Platelets   Occ  


 


Anisocytosis   Slight  


 


Sodium Level


 


 


 141 mmol/L


(136-145) 





 


Potassium Level


 


 


 3.8 mmol/L


(3.5-5.1) 





 


Chloride Level


 


 


 107 mmol/L


() 





 


Carbon Dioxide Level


 


 


 20 mmol/L


(21-32) 





 


Anion Gap   14 (6-14)  


 


Blood Urea Nitrogen


 


 


 63 mg/dL


(7-20) 





 


Creatinine


 


 


 2.0 mg/dL


(0.6-1.0) 





 


Estimated GFR


(Cockcroft-Gault) 


 


 26.5 


 





 


BUN/Creatinine Ratio   32 (6-20)  


 


Glucose Level


 


 


 212 mg/dL


(70-99) 





 


Calcium Level


 


 


 8.0 mg/dL


(8.5-10.1) 





 


Phosphorus Level


 


 


 4.7 mg/dL


(2.6-4.7) 





 


Magnesium Level


 


 


 2.1 mg/dL


(1.8-2.4) 





 


Total Bilirubin


 


 


 0.9 mg/dL


(0.2-1.0) 





 


Aspartate Amino Transf


(AST/SGOT) 


 


 46 U/L (15-37) 


 





 


Alanine Aminotransferase


(ALT/SGPT) 


 


 20 U/L (14-59) 


 





 


Alkaline Phosphatase


 


 


 95 U/L


() 





 


Total Protein


 


 


 4.1 g/dL


(6.4-8.2) 





 


Albumin


 


 


 1.8 g/dL


(3.4-5.0) 





 


Albumin/Globulin Ratio   0.8 (1.0-1.7)  


 


Lipase


 


 


 823 U/L


() 





 


Test


 4/3/20


15:30 4/3/20


16:57 4/3/20


23:58 4/4/20


04:40


 


O2 Saturation 97 % (92-99)    


 


Arterial Blood pH


 7.55


(7.35-7.45) 


 


 





 


Arterial Blood pCO2 at


Patient Temp 27 mmHg


(35-46) 


 


 





 


Arterial Blood pO2 at Patient


Temp 99 mmHg


() 


 


 





 


Arterial Blood HCO3


 23 mmol/L


(21-28) 


 


 





 


Arterial Blood Base Excess


 1 mmol/L


(-3-3) 


 


 





 


FiO2 40    


 


Glucose (Fingerstick)


 


 185 mg/dL


(70-99) 186 mg/dL


(70-99) 





 


White Blood Count


 


 


 


 11.2 x10^3/uL


(4.0-11.0)


 


Red Blood Count


 


 


 


 2.43 x10^6/uL


(3.50-5.40)


 


Hemoglobin


 


 


 


 8.0 g/dL


(12.0-15.5)


 


Hematocrit


 


 


 


 23.9 %


(36.0-47.0)


 


Mean Corpuscular Volume    98 fL () 


 


Mean Corpuscular Hemoglobin    33 pg (25-35) 


 


Mean Corpuscular Hemoglobin


Concent 


 


 


 33 g/dL


(31-37)


 


Red Cell Distribution Width


 


 


 


 18.3 %


(11.5-14.5)


 


Platelet Count


 


 


 


 209 x10^3/uL


(140-400)


 


Sodium Level


 


 


 


 135 mmol/L


(136-145)


 


Potassium Level


 


 


 


 4.0 mmol/L


(3.5-5.1)


 


Chloride Level


 


 


 


 103 mmol/L


()


 


Carbon Dioxide Level


 


 


 


 25 mmol/L


(21-32)


 


Anion Gap    7 (6-14) 


 


Blood Urea Nitrogen


 


 


 


 54 mg/dL


(7-20)


 


Creatinine


 


 


 


 2.0 mg/dL


(0.6-1.0)


 


Estimated GFR


(Cockcroft-Gault) 


 


 


 26.5 





 


Glucose Level


 


 


 


 175 mg/dL


(70-99)


 


Calcium Level


 


 


 


 8.3 mg/dL


(8.5-10.1)


 


Phosphorus Level


 


 


 


 3.9 mg/dL


(2.6-4.7)


 


Test


 4/4/20


05:22 


 


 





 


Glucose (Fingerstick)


 183 mg/dL


(70-99) 


 


 











Laboratory Tests








Test


 4/3/20


13:11 4/3/20


15:30 4/3/20


16:57 4/3/20


23:58


 


Glucose (Fingerstick)


 159 mg/dL


(70-99) 


 185 mg/dL


(70-99) 186 mg/dL


(70-99)


 


O2 Saturation  97 % (92-99)   


 


Arterial Blood pH


 


 7.55


(7.35-7.45) 


 





 


Arterial Blood pCO2 at


Patient Temp 


 27 mmHg


(35-46) 


 





 


Arterial Blood pO2 at Patient


Temp 


 99 mmHg


() 


 





 


Arterial Blood HCO3


 


 23 mmol/L


(21-28) 


 





 


Arterial Blood Base Excess


 


 1 mmol/L


(-3-3) 


 





 


FiO2  40   


 


Test


 4/4/20


04:40 4/4/20


05:22 


 





 


White Blood Count


 11.2 x10^3/uL


(4.0-11.0) 


 


 





 


Red Blood Count


 2.43 x10^6/uL


(3.50-5.40) 


 


 





 


Hemoglobin


 8.0 g/dL


(12.0-15.5) 


 


 





 


Hematocrit


 23.9 %


(36.0-47.0) 


 


 





 


Mean Corpuscular Volume 98 fL ()    


 


Mean Corpuscular Hemoglobin 33 pg (25-35)    


 


Mean Corpuscular Hemoglobin


Concent 33 g/dL


(31-37) 


 


 





 


Red Cell Distribution Width


 18.3 %


(11.5-14.5) 


 


 





 


Platelet Count


 209 x10^3/uL


(140-400) 


 


 





 


Sodium Level


 135 mmol/L


(136-145) 


 


 





 


Potassium Level


 4.0 mmol/L


(3.5-5.1) 


 


 





 


Chloride Level


 103 mmol/L


() 


 


 





 


Carbon Dioxide Level


 25 mmol/L


(21-32) 


 


 





 


Anion Gap 7 (6-14)    


 


Blood Urea Nitrogen


 54 mg/dL


(7-20) 


 


 





 


Creatinine


 2.0 mg/dL


(0.6-1.0) 


 


 





 


Estimated GFR


(Cockcroft-Gault) 26.5 


 


 


 





 


Glucose Level


 175 mg/dL


(70-99) 


 


 





 


Calcium Level


 8.3 mg/dL


(8.5-10.1) 


 


 





 


Phosphorus Level


 3.9 mg/dL


(2.6-4.7) 


 


 





 


Glucose (Fingerstick)


 


 183 mg/dL


(70-99) 


 











Medications





Active Scripts








 Medications  Dose


 Route/Sig


 Max Daily Dose Days Date Category


 


 Bisoprolol


 Fumarate 5 Mg


 Tablet  10 Mg


 PO DAILY


   3/16/20 Reported








Comments


Chest x-ray reviewed 4/2, poor ins film, basal effusions and volume loss


CT chest 3/30


reviewed





Impression


.


IMPRESSION:


1.  Acute hypoxemic respiratory failure secondary to ARDS due to acute 

pancreatitis, septic shock, abdominal distention, and pneumonia.and pleural 

effusions.  Pleural effusions secondary to abdominal process and/or general 

volume overload from renal failure.


2.  Gallstone pancreatitis. WITH NECROSIS


3.  Severe metabolic acidosis.stable


4.  Acute kidney injury. ON CRRT


5.  Acute gallstone pancreatitis.


6.  Hypoalbuminemia.


7.  Hypocalcemia.


8.  Leukocytosis


9.  Chronic anemia


10. Covid 19 testing negative


11. Acute /chronic anemia suspected hemorrhagic fluid in pelvis


12, Fever per ID





Plan


.


AC mode, wean FIO2/ PEEP 


continue sedation 


Not ready for trial, still fevers, hypoxia better


Transfuse prn, check H/H 


Not a surgical candidate per surgery


Antibiotics per ID, 


Follow nephrology input


Nutritional support with TPN


HD per renal


cxr with basal effusions, no intervention


Prognosis is guarded





DVT/GI PPX 


d/w RN/RT


FULL CODE











SHARYN SOLORZANO MD                  Apr 4, 2020 10:16

## 2020-04-04 NOTE — PDOC
SAURAV NASH APRN 4/4/20 0921:


SURGICAL PROGRESS NOTE


Subjective


no acute changes


Vital Signs





Vital Signs








  Date Time  Temp Pulse Resp B/P (MAP) Pulse Ox O2 Delivery O2 Flow Rate FiO2


 


4/4/20 07:00  84 24 116/58 (77) 98 Ventilator  


 


4/4/20 06:00 98.5       





 98.5       








I&O











Intake and Output 


 


 4/4/20





 07:00


 


Intake Total 2746.2 ml


 


Output Total 725 ml


 


Balance 2021.2 ml


 


 


 


IV Total 2746.2 ml


 


Output Urine Total 515 ml


 


Stool Total 10 ml


 


Gastric Drainage Total 200 ml








PATIENT HAS A VILLASENOR:  Yes


General:  Other (sedated )


HEENT:  Other (bilious og otuput)


Abdomen:  Other (firm. distended )


Labs





Laboratory Tests








Test


 4/2/20


17:16 4/3/20


05:51 4/3/20


06:00 4/3/20


13:11


 


Glucose (Fingerstick)


 211 mg/dL


(70-99) 209 mg/dL


(70-99) 


 159 mg/dL


(70-99)


 


White Blood Count


 


 


 15.7 x10^3/uL


(4.0-11.0) 





 


Red Blood Count


 


 


 2.57 x10^6/uL


(3.50-5.40) 





 


Hemoglobin


 


 


 8.2 g/dL


(12.0-15.5) 





 


Hematocrit


 


 


 25.8 %


(36.0-47.0) 





 


Mean Corpuscular Volume


 


 


 101 fL


() 





 


Mean Corpuscular Hemoglobin   32 pg (25-35)  


 


Mean Corpuscular Hemoglobin


Concent 


 


 32 g/dL


(31-37) 





 


Red Cell Distribution Width


 


 


 18.9 %


(11.5-14.5) 





 


Platelet Count


 


 


 233 x10^3/uL


(140-400) 





 


Neutrophils (%) (Auto)   83 % (31-73)  


 


Lymphocytes (%) (Auto)   7 % (24-48)  


 


Monocytes (%) (Auto)   8 % (0-9)  


 


Eosinophils (%) (Auto)   2 % (0-3)  


 


Basophils (%) (Auto)   0 % (0-3)  


 


Neutrophils # (Auto)


 


 


 13.0 x10^3/uL


(1.8-7.7) 





 


Lymphocytes # (Auto)


 


 


 1.1 x10^3/uL


(1.0-4.8) 





 


Monocytes # (Auto)


 


 


 1.2 x10^3/uL


(0.0-1.1) 





 


Eosinophils # (Auto)


 


 


 0.4 x10^3/uL


(0.0-0.7) 





 


Basophils # (Auto)


 


 


 0.1 x10^3/uL


(0.0-0.2) 





 


Segmented Neutrophils %   58 % (35-66)  


 


Band Neutrophils %   19 % (0-9)  


 


Lymphocytes %   13 % (24-48)  


 


Monocytes %   4 % (0-10)  


 


Eosinophils %   4 % (0-5)  


 


Myelocytes %   2 % (0-0)  


 


Nucleated Red Blood Cells   2  


 


Platelet Estimate


 


 


 Adequate


(ADEQUATE) 





 


Giant Platelets   Occ  


 


Anisocytosis   Slight  


 


Sodium Level


 


 


 141 mmol/L


(136-145) 





 


Potassium Level


 


 


 3.8 mmol/L


(3.5-5.1) 





 


Chloride Level


 


 


 107 mmol/L


() 





 


Carbon Dioxide Level


 


 


 20 mmol/L


(21-32) 





 


Anion Gap   14 (6-14)  


 


Blood Urea Nitrogen


 


 


 63 mg/dL


(7-20) 





 


Creatinine


 


 


 2.0 mg/dL


(0.6-1.0) 





 


Estimated GFR


(Cockcroft-Gault) 


 


 26.5 


 





 


BUN/Creatinine Ratio   32 (6-20)  


 


Glucose Level


 


 


 212 mg/dL


(70-99) 





 


Calcium Level


 


 


 8.0 mg/dL


(8.5-10.1) 





 


Phosphorus Level


 


 


 4.7 mg/dL


(2.6-4.7) 





 


Magnesium Level


 


 


 2.1 mg/dL


(1.8-2.4) 





 


Total Bilirubin


 


 


 0.9 mg/dL


(0.2-1.0) 





 


Aspartate Amino Transf


(AST/SGOT) 


 


 46 U/L (15-37) 


 





 


Alanine Aminotransferase


(ALT/SGPT) 


 


 20 U/L (14-59) 


 





 


Alkaline Phosphatase


 


 


 95 U/L


() 





 


Total Protein


 


 


 4.1 g/dL


(6.4-8.2) 





 


Albumin


 


 


 1.8 g/dL


(3.4-5.0) 





 


Albumin/Globulin Ratio   0.8 (1.0-1.7)  


 


Lipase


 


 


 823 U/L


() 





 


Test


 4/3/20


15:30 4/3/20


16:57 4/3/20


23:58 4/4/20


04:40


 


O2 Saturation 97 % (92-99)    


 


Arterial Blood pH


 7.55


(7.35-7.45) 


 


 





 


Arterial Blood pCO2 at


Patient Temp 27 mmHg


(35-46) 


 


 





 


Arterial Blood pO2 at Patient


Temp 99 mmHg


() 


 


 





 


Arterial Blood HCO3


 23 mmol/L


(21-28) 


 


 





 


Arterial Blood Base Excess


 1 mmol/L


(-3-3) 


 


 





 


FiO2 40    


 


Glucose (Fingerstick)


 


 185 mg/dL


(70-99) 186 mg/dL


(70-99) 





 


White Blood Count


 


 


 


 11.2 x10^3/uL


(4.0-11.0)


 


Red Blood Count


 


 


 


 2.43 x10^6/uL


(3.50-5.40)


 


Hemoglobin


 


 


 


 8.0 g/dL


(12.0-15.5)


 


Hematocrit


 


 


 


 23.9 %


(36.0-47.0)


 


Mean Corpuscular Volume    98 fL () 


 


Mean Corpuscular Hemoglobin    33 pg (25-35) 


 


Mean Corpuscular Hemoglobin


Concent 


 


 


 33 g/dL


(31-37)


 


Red Cell Distribution Width


 


 


 


 18.3 %


(11.5-14.5)


 


Platelet Count


 


 


 


 209 x10^3/uL


(140-400)


 


Sodium Level


 


 


 


 135 mmol/L


(136-145)


 


Potassium Level


 


 


 


 4.0 mmol/L


(3.5-5.1)


 


Chloride Level


 


 


 


 103 mmol/L


()


 


Carbon Dioxide Level


 


 


 


 25 mmol/L


(21-32)


 


Anion Gap    7 (6-14) 


 


Blood Urea Nitrogen


 


 


 


 54 mg/dL


(7-20)


 


Creatinine


 


 


 


 2.0 mg/dL


(0.6-1.0)


 


Estimated GFR


(Cockcroft-Gault) 


 


 


 26.5 





 


Glucose Level


 


 


 


 175 mg/dL


(70-99)


 


Calcium Level


 


 


 


 8.3 mg/dL


(8.5-10.1)


 


Phosphorus Level


 


 


 


 3.9 mg/dL


(2.6-4.7)


 


Test


 4/4/20


05:22 


 


 





 


Glucose (Fingerstick)


 183 mg/dL


(70-99) 


 


 











Laboratory Tests








Test


 4/3/20


13:11 4/3/20


15:30 4/3/20


16:57 4/3/20


23:58


 


Glucose (Fingerstick)


 159 mg/dL


(70-99) 


 185 mg/dL


(70-99) 186 mg/dL


(70-99)


 


O2 Saturation  97 % (92-99)   


 


Arterial Blood pH


 


 7.55


(7.35-7.45) 


 





 


Arterial Blood pCO2 at


Patient Temp 


 27 mmHg


(35-46) 


 





 


Arterial Blood pO2 at Patient


Temp 


 99 mmHg


() 


 





 


Arterial Blood HCO3


 


 23 mmol/L


(21-28) 


 





 


Arterial Blood Base Excess


 


 1 mmol/L


(-3-3) 


 





 


FiO2  40   


 


Test


 4/4/20


04:40 4/4/20


05:22 


 





 


White Blood Count


 11.2 x10^3/uL


(4.0-11.0) 


 


 





 


Red Blood Count


 2.43 x10^6/uL


(3.50-5.40) 


 


 





 


Hemoglobin


 8.0 g/dL


(12.0-15.5) 


 


 





 


Hematocrit


 23.9 %


(36.0-47.0) 


 


 





 


Mean Corpuscular Volume 98 fL ()    


 


Mean Corpuscular Hemoglobin 33 pg (25-35)    


 


Mean Corpuscular Hemoglobin


Concent 33 g/dL


(31-37) 


 


 





 


Red Cell Distribution Width


 18.3 %


(11.5-14.5) 


 


 





 


Platelet Count


 209 x10^3/uL


(140-400) 


 


 





 


Sodium Level


 135 mmol/L


(136-145) 


 


 





 


Potassium Level


 4.0 mmol/L


(3.5-5.1) 


 


 





 


Chloride Level


 103 mmol/L


() 


 


 





 


Carbon Dioxide Level


 25 mmol/L


(21-32) 


 


 





 


Anion Gap 7 (6-14)    


 


Blood Urea Nitrogen


 54 mg/dL


(7-20) 


 


 





 


Creatinine


 2.0 mg/dL


(0.6-1.0) 


 


 





 


Estimated GFR


(Cockcroft-Gault) 26.5 


 


 


 





 


Glucose Level


 175 mg/dL


(70-99) 


 


 





 


Calcium Level


 8.3 mg/dL


(8.5-10.1) 


 


 





 


Phosphorus Level


 3.9 mg/dL


(2.6-4.7) 


 


 





 


Glucose (Fingerstick)


 


 183 mg/dL


(70-99) 


 











Problem List


Problems


Medical Problems:


(1) Acute pancreatitis


Status: Acute  





(2) Cholelithiasis


Status: Acute  








Assessment/Plan


severe pancreatitis


supportive care





GAMAL ZHOU MD 4/4/20 1528:


SURGICAL PROGRESS NOTE


Assessment/Plan


Pt seen and examined.


Agree with MsLinden Korey's note


Pt intubated and sedated, appears stable


abd soft


tentatively plan trach on 4/6











SAURAV NASH APRN             Apr 4, 2020 09:21


GAMAL ZHOU MD              Apr 4, 2020 15:28

## 2020-04-04 NOTE — PDOC
PROGRESS NOTES


Chief Complaint


Chief Complaint


Respiratory failure requiring mechanical ventilation


Severe Acute gallstone pancreatitis (not a surgical candidate at this time) with

necrosis


Acute kidney failure now requiring dialysis


Salpingitis


Gallstones (Calculus of gallbladder with acute cholecystitis without 

obstruction)


HTN 


Leukocytosis 


Hypoxia


Uterine fibroid


Hypoxia with respiratory failure


Intractable pain


Intractable nausea


Covid 19 negative. 


Acute on chronic anemia will have to follow up since there is suspicion for 

hemorrhagic fluid in pelvis





Plan:





continue supportive measures


grim prognosis


discussed with surgical consultant


planning of trach for monday if unable to extubate





History of Present Illness


History of Present Illness


4/4/2020


No acute events reported overnight, patient receiving dialysis today, case 

discussed with nursing staff patient in no acute distress during my visit








4/3/2020


No new changes remains critically ill, off CRRT, still planning for trach on 

Monday unless we manage to wean over the weekend





4/2/2020


Continues to remain critically ill.  The patient unable to be taken off 

ventilatory support as of yet.,  Discussed with pulmonary consultant.  Planning 

all tracheostomy on Monday if he is unable to be weaned off vent





4/1/2020


No acute events reported overnight, no fever or chills, remains critically 

stable, discussed with mother at bedside. 





[id number]


Patient seen and examined in the ICU


She is critically ill


Mechanically ventilated


Assist-control/25/450/30 with 8 of PEEP


She is on cefepime daptomycin Flagyl and micafungin GEN for antibiotic coverage


Also getting TPN and CRRT


Sedated with Versed


Getting IV albumin also


She is tachycardic at 112 bpm


Temp max 100.0


White blood count is down from 45,000 35,000 today


Chart reviewed


Discussed with RN











[id number]


Patient seen and examined in the ICU


She remains very critically ill


Going for a CT of the abdomen chest and pelvis


Ventilated : On assist control 40% FiO2


Discussed with RN


Chart reviewed








[id number]


Patient seen and examined in the ICU


She is still on CRRT although we plan to change to hemodialysis soon


Chart reviewed


Discussed with RN


Hemoglobin down to 6.9 (suspect CRRT related , Will let nephrology consider 

transfusion while on dialysis)








[id number]


Patient seen and examined in the ICU


She is mechanically ventilated


Before meals/25/450/30%


Also on CRRT


Very critically ill


Chart reviewed


Discussed with RN











[id number]


Patient seen and examined in the ICU


She is now been transferred to the Covid 19 unit so we can rule out Covid and 

keep her in isolation


Very critically ill


Intubated and  ventilated


Assist control 40%


Chart reviewed


Discussed with RN


Still on CRRT


Getting a chest x-ray now


Very concerned about her prognosis








[id number]


Patient seen and examined in the ICU


She is extremely critically ill


Remains mechanically ventilated with assist control/25/450/40%


Also on continuous renal replacement therapy


Chart reviewed


Discussed with RN


She has TPN hanging


Also on pressors











[id number]


Patient seen and examined in the ICU


She is still requiring CRRT


On the vent with assist control but her FiO2 is down to 40% from yesterday


Slightly better but still very critically ill


Discussed with RN


Chart reviewed








[id number]


Patient seen and examined in the ICU


She remains extremely critically ill


On IV fentanyl IV Versed IV Doxy


On the vent


Assist-control/25/450/70%


She is critically ill


Discussed with RN


Chart reviewed


Patient is currently on CRRT as well








[id number]


Patient seen and examined in the ICU


She had to be intubated this morning


On assist-control 25/450/100% with 10 of PEEP and only satting 87%


She is extremely critically ill


I'm not sure if she will survive


Chart reviewed


Discussed with RN











Ms Tadeo is a 48yo F w/ PMHx HTN, prediabetes who presents the emergency room

complaints of abdominal pain. Patient described off and on 3 days. She states is

constant, described as a squeezing sensation in a band-like distribution. + 

nausea, vomiting.  She denies any fever or diarrhea.  Patient denies any 

abdominal surgical procedures.  She states is worse with movements, car ride.  

Pain initially was upper abdomen however now pretty much generalized.  Last 

bowel movement was 3/15/2020. Nothing makes her pain better.  Patient denies any

shortness of breath.  She does state the pain moves into her chest.  Denies any 

headache or visual changes.


Lipase 46320, , , Bilirubin 1.4.


CT abdomen confirms pancreatic inflammation, peripancreatic fluid and 

inflammatory changes around the pancreas consistent with pancreatitis. 

Cholelithiasis and 1.4cm uterine fibroid as well as possible left salpingitis. 

Admitted for further care


GI, General surgery, ID, Pulm consulted.





3/17: Overnight per report no urine output. Added dilaudid for pain, PICC placed

per IR. Renal US negative.Seen bedside in ICU, given 2L additional NSS and alb

umin infusion. Still hypotensive, started on levophed. Repeat CT abdomen with 

necrosis. Updated her fiancee


3/18: Sats are only 87% on nasal cannula oxygen. Dialysis catheter per 

nephrology


3/19: She is now on BiPAP appears more ill, now on dialysis


3/20: Seen on BiPAP. Her mother and another family member are present and seemed

to be good support for her. Currently on dialysis. Appears critically ill


3/21: Overnight Tmax 101.7 , still on BiPAP FiO2 40%, still on low dose Levophed

gtt, TPN initiated. On dialysis





Overnight still febrile. Dialysis today. She wakes up and responds to pain. No 

CP.





Vitals


Vitals





Vital Signs








  Date Time  Temp Pulse Resp B/P (MAP) Pulse Ox O2 Delivery O2 Flow Rate FiO2


 


4/4/20 18:27  76 18 80/53 (62) 100 Ventilator  


 


4/4/20 17:00 97.8       





 97.8       











Physical Exam


Physical Exam


GENERAL: Orally intubated/sedated - generalized anasarca 


HEENT: Pupils equal, ETT, OGT 


NECK:  Supple 


LUNGS: Diminished aeration bases 


HEART:  S1, S2, regular 


ABDOMEN:  Distended, hypoactive BS, Rectal tube in place 


: [place]   


EXTREMITIES: Generalized edema, no cyanosis - some mottling, [place] boots & SCDs 

bilaterally 


DERMATOLOGIC:  Warm and dry.  No generalized rash.  


CENTRAL NERVOUS SYSTEM: Sedated 


RIJ Temp HDC, RUE-PICC & LIJ


General:  Other (sedated )


Heart:  Other (increased rate)


Abdomen:  Other (firm. distended )


Extremities:  No edema, Other (SOME CLUBBING )


Skin:  Other (mottling noted to extremities )





Labs


LABS





Laboratory Tests








Test


 4/3/20


23:58 4/4/20


04:40 4/4/20


05:22 4/4/20


14:54


 


Glucose (Fingerstick)


 186 mg/dL


(70-99) 


 183 mg/dL


(70-99) 





 


White Blood Count


 


 11.2 x10^3/uL


(4.0-11.0) 


 





 


Red Blood Count


 


 2.43 x10^6/uL


(3.50-5.40) 


 





 


Hemoglobin


 


 8.0 g/dL


(12.0-15.5) 


 





 


Hematocrit


 


 23.9 %


(36.0-47.0) 


 





 


Mean Corpuscular Volume  98 fL ()   


 


Mean Corpuscular Hemoglobin  33 pg (25-35)   


 


Mean Corpuscular Hemoglobin


Concent 


 33 g/dL


(31-37) 


 





 


Red Cell Distribution Width


 


 18.3 %


(11.5-14.5) 


 





 


Platelet Count


 


 209 x10^3/uL


(140-400) 


 





 


Sodium Level


 


 135 mmol/L


(136-145) 


 





 


Potassium Level


 


 4.0 mmol/L


(3.5-5.1) 


 





 


Chloride Level


 


 103 mmol/L


() 


 





 


Carbon Dioxide Level


 


 25 mmol/L


(21-32) 


 





 


Anion Gap  7 (6-14)   


 


Blood Urea Nitrogen


 


 54 mg/dL


(7-20) 


 





 


Creatinine


 


 2.0 mg/dL


(0.6-1.0) 


 





 


Estimated GFR


(Cockcroft-Gault) 


 26.5 


 


 





 


Glucose Level


 


 175 mg/dL


(70-99) 


 





 


Calcium Level


 


 8.3 mg/dL


(8.5-10.1) 


 





 


Phosphorus Level


 


 3.9 mg/dL


(2.6-4.7) 


 





 


Urine Collection Type    Unknown 


 


Urine Color    Red 


 


Urine Clarity    Cloudy 


 


Urine pH    5.0 (<5.0-8.0) 


 


Urine Specific Gravity


 


 


 


 1.025


(1.000-1.030)


 


Urine Protein


 


 


 


 100 mg/dL


(NEG-TRACE)


 


Urine Glucose (UA)


 


 


 


 Negative mg/dL


(NEG)


 


Urine Ketones (Stick)


 


 


 


 Trace mg/dL


(NEG)


 


Urine Blood    Large (NEG) 


 


Urine Nitrite    Positive (NEG) 


 


Urine Bilirubin    Small (NEG) 


 


Urine Urobilinogen Dipstick


 


 


 


 0.2 mg/dL (0.2


mg/dL)


 


Urine Leukocyte Esterase    Small (NEG) 


 


Urine RBC


 


 


 


 Rare /HPF


(0-2)


 


Urine WBC    1-4 /HPF (0-4) 


 


Urine Squamous Epithelial


Cells 


 


 


 Occ /LPF 





 


Urine Amorphous Sediment    Present /HPF 


 


Urine Bacteria    0 /HPF (0-FEW) 


 


Test


 4/4/20


17:50 4/4/20


18:38 


 





 


O2 Saturation 98 % (92-99)    


 


Arterial Blood pH


 7.48


(7.35-7.45) 


 


 





 


Arterial Blood pCO2 at


Patient Temp 30 mmHg


(35-46) 


 


 





 


Arterial Blood pO2 at Patient


Temp 122 mmHg


() 


 


 





 


Arterial Blood HCO3


 22 mmol/L


(21-28) 


 


 





 


Arterial Blood Base Excess


 -1 mmol/L


(-3-3) 


 


 





 


FiO2 50    


 


Glucose (Fingerstick)


 


 171 mg/dL


(70-99) 


 














Assessment and Plan


Assessmemt and Plan


Problems


Medical Problems:


(1) Acute pancreatitis


Status: Acute  





(2) Cholelithiasis


Status: Acute  











Comment


Review of Relevant


I have reviewed the following items josy (where applicable) has been applied.


Labs





Laboratory Tests








Test


 4/3/20


05:51 4/3/20


06:00 4/3/20


13:11 4/3/20


15:30


 


Glucose (Fingerstick)


 209 mg/dL


(70-99) 


 159 mg/dL


(70-99) 





 


White Blood Count


 


 15.7 x10^3/uL


(4.0-11.0) 


 





 


Red Blood Count


 


 2.57 x10^6/uL


(3.50-5.40) 


 





 


Hemoglobin


 


 8.2 g/dL


(12.0-15.5) 


 





 


Hematocrit


 


 25.8 %


(36.0-47.0) 


 





 


Mean Corpuscular Volume


 


 101 fL


() 


 





 


Mean Corpuscular Hemoglobin  32 pg (25-35)   


 


Mean Corpuscular Hemoglobin


Concent 


 32 g/dL


(31-37) 


 





 


Red Cell Distribution Width


 


 18.9 %


(11.5-14.5) 


 





 


Platelet Count


 


 233 x10^3/uL


(140-400) 


 





 


Neutrophils (%) (Auto)  83 % (31-73)   


 


Lymphocytes (%) (Auto)  7 % (24-48)   


 


Monocytes (%) (Auto)  8 % (0-9)   


 


Eosinophils (%) (Auto)  2 % (0-3)   


 


Basophils (%) (Auto)  0 % (0-3)   


 


Neutrophils # (Auto)


 


 13.0 x10^3/uL


(1.8-7.7) 


 





 


Lymphocytes # (Auto)


 


 1.1 x10^3/uL


(1.0-4.8) 


 





 


Monocytes # (Auto)


 


 1.2 x10^3/uL


(0.0-1.1) 


 





 


Eosinophils # (Auto)


 


 0.4 x10^3/uL


(0.0-0.7) 


 





 


Basophils # (Auto)


 


 0.1 x10^3/uL


(0.0-0.2) 


 





 


Segmented Neutrophils %  58 % (35-66)   


 


Band Neutrophils %  19 % (0-9)   


 


Lymphocytes %  13 % (24-48)   


 


Monocytes %  4 % (0-10)   


 


Eosinophils %  4 % (0-5)   


 


Myelocytes %  2 % (0-0)   


 


Nucleated Red Blood Cells  2   


 


Platelet Estimate


 


 Adequate


(ADEQUATE) 


 





 


Giant Platelets  Occ   


 


Anisocytosis  Slight   


 


Sodium Level


 


 141 mmol/L


(136-145) 


 





 


Potassium Level


 


 3.8 mmol/L


(3.5-5.1) 


 





 


Chloride Level


 


 107 mmol/L


() 


 





 


Carbon Dioxide Level


 


 20 mmol/L


(21-32) 


 





 


Anion Gap  14 (6-14)   


 


Blood Urea Nitrogen


 


 63 mg/dL


(7-20) 


 





 


Creatinine


 


 2.0 mg/dL


(0.6-1.0) 


 





 


Estimated GFR


(Cockcroft-Gault) 


 26.5 


 


 





 


BUN/Creatinine Ratio  32 (6-20)   


 


Glucose Level


 


 212 mg/dL


(70-99) 


 





 


Calcium Level


 


 8.0 mg/dL


(8.5-10.1) 


 





 


Phosphorus Level


 


 4.7 mg/dL


(2.6-4.7) 


 





 


Magnesium Level


 


 2.1 mg/dL


(1.8-2.4) 


 





 


Total Bilirubin


 


 0.9 mg/dL


(0.2-1.0) 


 





 


Aspartate Amino Transf


(AST/SGOT) 


 46 U/L (15-37) 


 


 





 


Alanine Aminotransferase


(ALT/SGPT) 


 20 U/L (14-59) 


 


 





 


Alkaline Phosphatase


 


 95 U/L


() 


 





 


Total Protein


 


 4.1 g/dL


(6.4-8.2) 


 





 


Albumin


 


 1.8 g/dL


(3.4-5.0) 


 





 


Albumin/Globulin Ratio  0.8 (1.0-1.7)   


 


Lipase


 


 823 U/L


() 


 





 


O2 Saturation    97 % (92-99) 


 


Arterial Blood pH


 


 


 


 7.55


(7.35-7.45)


 


Arterial Blood pCO2 at


Patient Temp 


 


 


 27 mmHg


(35-46)


 


Arterial Blood pO2 at Patient


Temp 


 


 


 99 mmHg


()


 


Arterial Blood HCO3


 


 


 


 23 mmol/L


(21-28)


 


Arterial Blood Base Excess


 


 


 


 1 mmol/L


(-3-3)


 


FiO2    40 


 


Test


 4/3/20


16:57 4/3/20


23:58 4/4/20


04:40 4/4/20


05:22


 


Glucose (Fingerstick)


 185 mg/dL


(70-99) 186 mg/dL


(70-99) 


 183 mg/dL


(70-99)


 


White Blood Count


 


 


 11.2 x10^3/uL


(4.0-11.0) 





 


Red Blood Count


 


 


 2.43 x10^6/uL


(3.50-5.40) 





 


Hemoglobin


 


 


 8.0 g/dL


(12.0-15.5) 





 


Hematocrit


 


 


 23.9 %


(36.0-47.0) 





 


Mean Corpuscular Volume   98 fL ()  


 


Mean Corpuscular Hemoglobin   33 pg (25-35)  


 


Mean Corpuscular Hemoglobin


Concent 


 


 33 g/dL


(31-37) 





 


Red Cell Distribution Width


 


 


 18.3 %


(11.5-14.5) 





 


Platelet Count


 


 


 209 x10^3/uL


(140-400) 





 


Sodium Level


 


 


 135 mmol/L


(136-145) 





 


Potassium Level


 


 


 4.0 mmol/L


(3.5-5.1) 





 


Chloride Level


 


 


 103 mmol/L


() 





 


Carbon Dioxide Level


 


 


 25 mmol/L


(21-32) 





 


Anion Gap   7 (6-14)  


 


Blood Urea Nitrogen


 


 


 54 mg/dL


(7-20) 





 


Creatinine


 


 


 2.0 mg/dL


(0.6-1.0) 





 


Estimated GFR


(Cockcroft-Gault) 


 


 26.5 


 





 


Glucose Level


 


 


 175 mg/dL


(70-99) 





 


Calcium Level


 


 


 8.3 mg/dL


(8.5-10.1) 





 


Phosphorus Level


 


 


 3.9 mg/dL


(2.6-4.7) 





 


Test


 4/4/20


14:54 4/4/20


17:50 4/4/20


18:38 





 


Urine Collection Type Unknown    


 


Urine Color Red    


 


Urine Clarity Cloudy    


 


Urine pH 5.0 (<5.0-8.0)    


 


Urine Specific Gravity


 1.025


(1.000-1.030) 


 


 





 


Urine Protein


 100 mg/dL


(NEG-TRACE) 


 


 





 


Urine Glucose (UA)


 Negative mg/dL


(NEG) 


 


 





 


Urine Ketones (Stick)


 Trace mg/dL


(NEG) 


 


 





 


Urine Blood Large (NEG)    


 


Urine Nitrite Positive (NEG)    


 


Urine Bilirubin Small (NEG)    


 


Urine Urobilinogen Dipstick


 0.2 mg/dL (0.2


mg/dL) 


 


 





 


Urine Leukocyte Esterase Small (NEG)    


 


Urine RBC


 Rare /HPF


(0-2) 


 


 





 


Urine WBC 1-4 /HPF (0-4)    


 


Urine Squamous Epithelial


Cells Occ /LPF 


 


 


 





 


Urine Amorphous Sediment Present /HPF    


 


Urine Bacteria 0 /HPF (0-FEW)    


 


O2 Saturation  98 % (92-99)   


 


Arterial Blood pH


 


 7.48


(7.35-7.45) 


 





 


Arterial Blood pCO2 at


Patient Temp 


 30 mmHg


(35-46) 


 





 


Arterial Blood pO2 at Patient


Temp 


 122 mmHg


() 


 





 


Arterial Blood HCO3


 


 22 mmol/L


(21-28) 


 





 


Arterial Blood Base Excess


 


 -1 mmol/L


(-3-3) 


 





 


FiO2  50   


 


Glucose (Fingerstick)


 


 


 171 mg/dL


(70-99) 











Laboratory Tests








Test


 4/3/20


23:58 4/4/20


04:40 4/4/20


05:22 4/4/20


14:54


 


Glucose (Fingerstick)


 186 mg/dL


(70-99) 


 183 mg/dL


(70-99) 





 


White Blood Count


 


 11.2 x10^3/uL


(4.0-11.0) 


 





 


Red Blood Count


 


 2.43 x10^6/uL


(3.50-5.40) 


 





 


Hemoglobin


 


 8.0 g/dL


(12.0-15.5) 


 





 


Hematocrit


 


 23.9 %


(36.0-47.0) 


 





 


Mean Corpuscular Volume  98 fL ()   


 


Mean Corpuscular Hemoglobin  33 pg (25-35)   


 


Mean Corpuscular Hemoglobin


Concent 


 33 g/dL


(31-37) 


 





 


Red Cell Distribution Width


 


 18.3 %


(11.5-14.5) 


 





 


Platelet Count


 


 209 x10^3/uL


(140-400) 


 





 


Sodium Level


 


 135 mmol/L


(136-145) 


 





 


Potassium Level


 


 4.0 mmol/L


(3.5-5.1) 


 





 


Chloride Level


 


 103 mmol/L


() 


 





 


Carbon Dioxide Level


 


 25 mmol/L


(21-32) 


 





 


Anion Gap  7 (6-14)   


 


Blood Urea Nitrogen


 


 54 mg/dL


(7-20) 


 





 


Creatinine


 


 2.0 mg/dL


(0.6-1.0) 


 





 


Estimated GFR


(Cockcroft-Gault) 


 26.5 


 


 





 


Glucose Level


 


 175 mg/dL


(70-99) 


 





 


Calcium Level


 


 8.3 mg/dL


(8.5-10.1) 


 





 


Phosphorus Level


 


 3.9 mg/dL


(2.6-4.7) 


 





 


Urine Collection Type    Unknown 


 


Urine Color    Red 


 


Urine Clarity    Cloudy 


 


Urine pH    5.0 (<5.0-8.0) 


 


Urine Specific Gravity


 


 


 


 1.025


(1.000-1.030)


 


Urine Protein


 


 


 


 100 mg/dL


(NEG-TRACE)


 


Urine Glucose (UA)


 


 


 


 Negative mg/dL


(NEG)


 


Urine Ketones (Stick)


 


 


 


 Trace mg/dL


(NEG)


 


Urine Blood    Large (NEG) 


 


Urine Nitrite    Positive (NEG) 


 


Urine Bilirubin    Small (NEG) 


 


Urine Urobilinogen Dipstick


 


 


 


 0.2 mg/dL (0.2


mg/dL)


 


Urine Leukocyte Esterase    Small (NEG) 


 


Urine RBC


 


 


 


 Rare /HPF


(0-2)


 


Urine WBC    1-4 /HPF (0-4) 


 


Urine Squamous Epithelial


Cells 


 


 


 Occ /LPF 





 


Urine Amorphous Sediment    Present /HPF 


 


Urine Bacteria    0 /HPF (0-FEW) 


 


Test


 4/4/20


17:50 4/4/20


18:38 


 





 


O2 Saturation 98 % (92-99)    


 


Arterial Blood pH


 7.48


(7.35-7.45) 


 


 





 


Arterial Blood pCO2 at


Patient Temp 30 mmHg


(35-46) 


 


 





 


Arterial Blood pO2 at Patient


Temp 122 mmHg


() 


 


 





 


Arterial Blood HCO3


 22 mmol/L


(21-28) 


 


 





 


Arterial Blood Base Excess


 -1 mmol/L


(-3-3) 


 


 





 


FiO2 50    


 


Glucose (Fingerstick)


 


 171 mg/dL


(70-99) 


 











Microbiology


3/24/20 Blood Culture - Final, Complete


          NO GROWTH AFTER 5 DAYS


Medications





Current Medications


Sodium Chloride 1,000 ml @  1,000 mls/hr Q1H IV  Last administered on 3/16/20at 

03:00;  Start 3/16/20 at 03:00;  Stop 3/16/20 at 03:59;  Status DC


Ondansetron HCl (Zofran) 4 mg 1X  ONCE IVP  Last administered on 3/16/20at 

03:27;  Start 3/16/20 at 03:00;  Stop 3/16/20 at 03:01;  Status DC


Morphine Sulfate (Morphine Sulfate) 4 mg 1X  ONCE IV ;  Start 3/16/20 at 03:00; 

Stop 3/16/20 at 03:01;  Status Cancel


Ketorolac Tromethamine (Toradol 30mg Vial) 30 mg 1X  ONCE IV  Last administered 

on 3/16/20at 02:54;  Start 3/16/20 at 03:00;  Stop 3/16/20 at 03:01;  Status DC


Fentanyl Citrate (Fentanyl 2ml Vial) 25 mcg 1X  ONCE IVP  Last administered on 

3/16/20at 03:23;  Start 3/16/20 at 03:30;  Stop 3/16/20 at 03:31;  Status DC


Fentanyl Citrate (Fentanyl 2ml Vial) 100 mcg STK-MED ONCE .ROUTE ;  Start 

3/16/20 at 03:18;  Stop 3/16/20 at 03:18;  Status DC


Iohexol (Omnipaque 350 Mg/ml) 90 ml 1X  ONCE IV  Last administered on 3/16/20at 

03:25;  Start 3/16/20 at 03:30;  Stop 3/16/20 at 03:31;  Status DC


Info (CONTRAST GIVEN -- Rx MONITORING) 1 each PRN DAILY  PRN MC SEE COMMENTS;  

Start 3/16/20 at 03:30;  Stop 3/18/20 at 03:29;  Status DC


Hydromorphone HCl (Dilaudid) 0.5 mg 1X  ONCE IV  Last administered on 3/16/20at 

03:55;  Start 3/16/20 at 04:30;  Stop 3/16/20 at 04:32;  Status DC


Ondansetron HCl (Zofran) 4 mg PRN Q8HRS  PRN IV NAUSEA/VOMITING 1ST CHOICE;  

Start 3/16/20 at 05:00;  Stop 3/16/20 at 09:27;  Status DC


Morphine Sulfate (Morphine Sulfate) 2 mg PRN Q2HR  PRN IV SEVERE PAIN 7-10 Last 

administered on 3/17/20at 12:26;  Start 3/16/20 at 05:00;  Stop 3/17/20 at 

14:15;  Status DC


Sodium Chloride 1,000 ml @  125 mls/hr Q8H IV  Last administered on 3/16/20at 

20:56;  Start 3/16/20 at 05:00;  Stop 3/17/20 at 04:59;  Status DC


Hydromorphone HCl (Dilaudid) 0.5 mg PRN Q3HRS  PRN IV SEVERE PAIN 7-10 Last 

administered on 3/17/20at 10:06;  Start 3/16/20 at 05:00;  Stop 3/17/20 at 

12:01;  Status DC


Piperacillin Sod/ Tazobactam Sod 4.5 gm/Sodium Chloride 100 ml @  200 mls/hr 1X 

ONCE IV  Last administered on 3/16/20at 05:44;  Start 3/16/20 at 06:00;  Stop 

3/16/20 at 06:29;  Status DC


Ondansetron HCl (Zofran) 4 mg PRN Q4HRS  PRN IV NAUSEA/VOMITING 1ST CHOICE Last 

administered on 3/21/20at 16:15;  Start 3/16/20 at 09:30


Insulin Human Lispro (HumaLOG) 0-9 UNITS Q6HRS SQ  Last administered on 4/4/20at

19:12;  Start 3/16/20 at 09:30


Dextrose (Dextrose 50%-Water Syringe) 12.5 gm PRN Q15MIN  PRN IV SEE COMMENTS;  

Start 3/16/20 at 09:30


Pantoprazole Sodium (PROTONIX VIAL for IV PUSH) 40 mg DAILYAC IVP  Last 

administered on 4/4/20at 07:33;  Start 3/16/20 at 11:30


Prochlorperazine Edisylate (Compazine) 10 mg PRN Q6HRS  PRN IV NAUSEA/VOMITING, 

2nd CHOICE Last administered on 3/17/20at 00:42;  Start 3/16/20 at 17:45


Atenolol (Tenormin) 100 mg DAILY PO ;  Start 3/17/20 at 09:00;  Stop 3/16/20 at 

20:08;  Status DC


Metoprolol Tartrate (Lopressor Vial) 2.5 mg Q6HRS IVP  Last administered on 

3/17/20at 05:51;  Start 3/16/20 at 20:15;  Stop 3/17/20 at 10:02;  Status DC


Metoprolol Tartrate (Lopressor Vial) 5 mg Q6HRS IVP  Last administered on 

3/26/20at 00:12;  Start 3/17/20 at 10:15;  Stop 3/28/20 at 08:48;  Status DC


Hydromorphone HCl (Dilaudid) 1 mg PRN Q3HRS  PRN IV SEVERE PAIN 7-10 Last 

administered on 3/23/20at 05:13;  Start 3/17/20 at 12:00;  Stop 3/31/20 at 

00:25;  Status DC


Lidocaine HCl (Buffered Lidocaine 1%) 3 ml STK-MED ONCE .ROUTE ;  Start 3/17/20 

at 12:55;  Stop 3/17/20 at 12:56;  Status DC


Albumin Human 500 ml @  125 mls/hr 1X  ONCE IV  Last administered on 3/17/20at 

14:33;  Start 3/17/20 at 14:30;  Stop 3/17/20 at 18:32;  Status DC


Norepinephrine Bitartrate 8 mg/ Dextrose 258 ml @  17.299 mls/ hr CONT  PRN IV 

PER PROTOCOL Last administered on 4/3/20at 07:51;  Start 3/17/20 at 15:30


Sodium Chloride 1,000 ml @  125 mls/hr Q8H IV  Last administered on 3/17/20at 

21:04;  Start 3/17/20 at 16:00;  Stop 3/18/20 at 02:42;  Status DC


Albumin Human 500 ml @  125 mls/hr PRN BID  PRN IV After every 2L NSS & BP < 

90mm Last administered on 4/1/20at 14:21;  Start 3/17/20 at 16:00


Iohexol (Omnipaque 300 Mg/ml) 60 ml 1X  ONCE IV  Last administered on 3/17/20at 

17:20;  Start 3/17/20 at 17:00;  Stop 3/17/20 at 17:01;  Status DC


Info (CONTRAST GIVEN -- Rx MONITORING) 1 each PRN DAILY  PRN MC SEE COMMENTS;  

Start 3/17/20 at 17:00;  Stop 3/19/20 at 16:59;  Status DC


Meropenem 1 gm/ Sodium Chloride 100 ml @  200 mls/hr Q8HRS IV  Last administered

on 3/18/20at 05:45;  Start 3/17/20 at 20:00;  Stop 3/18/20 at 08:48;  Status DC


Furosemide (Lasix) 40 mg 1X  ONCE IVP  Last administered on 3/17/20at 22:12;  

Start 3/17/20 at 22:30;  Stop 3/17/20 at 22:31;  Status DC


Calcium Chloride 1000 mg/Sodium Chloride 110 ml @  220 mls/hr 1X  ONCE IV  Last 

administered on 3/17/20at 22:11;  Start 3/17/20 at 22:30;  Stop 3/17/20 at 

22:59;  Status DC


Albuterol Sulfate (Ventolin Neb Soln) 2.5 mg 1X  ONCE NEB  Last administered on 

3/18/20at 00:56;  Start 3/17/20 at 22:30;  Stop 3/17/20 at 22:31;  Status DC


Insulin Human Regular (HumuLIN R VIAL) 5 unit 1X  ONCE IV  Last administered on 

3/17/20at 22:14;  Start 3/17/20 at 22:30;  Stop 3/17/20 at 22:31;  Status DC


Magnesium Sulfate 50 ml @ 25 mls/hr 1X  ONCE IV  Last administered on 3/18/20at 

02:57;  Start 3/18/20 at 03:00;  Stop 3/18/20 at 04:59;  Status DC


Calcium Gluconate 1000 mg/Sodium Chloride 110 ml @  220 mls/hr 1X  ONCE IV  Last

administered on 3/18/20at 02:46;  Start 3/18/20 at 03:00;  Stop 3/18/20 at 

03:29;  Status DC


Sodium Chloride 1,000 ml @  200 mls/hr Q5H IV  Last administered on 3/18/20at 

02:46;  Start 3/18/20 at 03:00;  Stop 3/18/20 at 10:21;  Status DC


Calcium Gluconate 1000 mg/Sodium Chloride 110 ml @  220 mls/hr 1X  ONCE IV  Last

administered on 3/18/20at 03:21;  Start 3/18/20 at 03:30;  Stop 3/18/20 at 

03:59;  Status DC


Sodium Bicarbonate 50 meq/Sodium Chloride 1,050 ml @  75 mls/hr Q14H IV  Last 

administered on 3/22/20at 21:10;  Start 3/18/20 at 07:30;  Stop 3/23/20 at 10:2

8;  Status DC


Calcium Gluconate 2000 mg/Sodium Chloride 120 ml @  220 mls/hr 1X  ONCE IV  Last

administered on 3/18/20at 09:05;  Start 3/18/20 at 07:30;  Stop 3/18/20 at 

08:02;  Status DC


Lidocaine HCl (Xylocaine-Mpf 1% 2ml Vial) 2 ml STK-MED ONCE .ROUTE ;  Start 

3/18/20 at 08:47;  Stop 3/18/20 at 08:47;  Status DC


Meropenem 500 mg/ Sodium Chloride 50 ml @  100 mls/hr Q12HR IV  Last 

administered on 3/23/20at 21:01;  Start 3/18/20 at 18:00;  Stop 3/24/20 at 

07:58;  Status DC


Lidocaine HCl (Buffered Lidocaine 1%) 3 ml STK-MED ONCE .ROUTE ;  Start 3/18/20 

at 09:46;  Stop 3/18/20 at 09:46;  Status DC


Lidocaine HCl (Buffered Lidocaine 1%) 6 ml 1X  ONCE INJ  Last administered on 

3/18/20at 10:26;  Start 3/18/20 at 10:15;  Stop 3/18/20 at 10:16;  Status DC


Info (Tpn Per Pharmacy) 1 each PRN DAILY  PRN MC SEE COMMENTS Last administered 

on 4/4/20at 10:28;  Start 3/18/20 at 12:00


Sodium Chloride 1,000 ml @  1,000 mls/hr Q1H PRN IV hypotension;  Start 3/18/20 

at 12:07;  Stop 3/18/20 at 18:06;  Status DC


Diphenhydramine HCl (Benadryl) 25 mg 1X PRN  PRN IV ITCHING;  Start 3/18/20 at 

12:15;  Stop 3/19/20 at 12:14;  Status DC


Diphenhydramine HCl (Benadryl) 25 mg 1X PRN  PRN IV ITCHING;  Start 3/18/20 at 

12:15;  Stop 3/19/20 at 12:14;  Status DC


Sodium Chloride 1,000 ml @  400 mls/hr Q2H30M PRN IV PATENCY;  Start 3/18/20 at 

12:07;  Stop 3/19/20 at 00:06;  Status DC


Info (PHARMACY MONITORING -- do not chart) 1 each PRN DAILY  PRN MC SEE 

COMMENTS;  Start 3/18/20 at 12:15;  Stop 3/20/20 at 08:13;  Status DC


Sodium Chloride 90 meq/Calcium Gluconate 10 meq/ Multivitamins 10 ml/Chromium/ 

Copper/Manganese/ Seleni/Zn 1 ml/ Total Parenteral Nutrition/Amino Acids

/Dextrose/ Fat Emulsion Intravenous 55.005 ml  @ 2.292 mls/hr TPN  CONT IV ;  

Start 3/18/20 at 22:00;  Stop 3/18/20 at 12:33;  Status DC


Info (Tpn Per Pharmacy) 1 each PRN DAILY  PRN MC SEE COMMENTS;  Start 3/18/20 at

12:30;  Status UNV


Sodium Chloride 90 meq/Calcium Gluconate 10 meq/ Multivitamins 10 ml/Chromium/ 

Copper/Manganese/ Seleni/Zn 0.5 ml/ Total Parenteral Nutrition/Amino 

Acids/Dextrose/ Fat Emulsion Intravenous 1,512 ml @  63 mls/hr TPN  CONT IV  

Last administered on 3/18/20at 22:06;  Start 3/18/20 at 22:00;  Stop 3/19/20 at 

21:59;  Status DC


Calcium Carbonate/ Glycine (Tums) 500 mg PRN AFTMEALHC  PRN PO INDIGESTION;  Sta

rt 3/18/20 at 17:45


Calcium Gluconate (Calcium Gluconate) 2,000 mg 1X  ONCE IVP  Last administered 

on 3/19/20at 02:19;  Start 3/19/20 at 02:15;  Stop 3/19/20 at 02:16;  Status DC


Calcium Chloride 3000 mg/Sodium Chloride 1,030 ml @  50 mls/hr [id number] IV  Last 

administered on 3/21/20at 02:17;  Start 3/19/20 at 08:00;  Stop 3/21/20 at 

15:23;  Status DC


Lorazepam (Ativan Inj) 1 mg PRN Q4HRS  PRN IVP ANXIETY / AGITATION Last 

administered on 3/23/20at 00:34;  Start 3/19/20 at 09:00


Sodium Chloride 1,000 ml @  1,000 mls/hr Q1H PRN IV hypotension;  Start 3/19/20 

at 08:56;  Stop 3/19/20 at 14:55;  Status DC


Albumin Human 200 ml @  200 mls/hr 1X PRN  PRN IV Hypotension;  Start 3/19/20 at

09:00;  Stop 3/19/20 at 14:59;  Status DC


Diphenhydramine HCl (Benadryl) 25 mg 1X PRN  PRN IV ITCHING;  Start 3/19/20 at 

09:00;  Stop 3/20/20 at 08:59;  Status DC


Diphenhydramine HCl (Benadryl) 25 mg 1X PRN  PRN IV ITCHING;  Start 3/19/20 at 

09:00;  Stop 3/20/20 at 08:59;  Status DC


Sodium Chloride 1,000 ml @  400 mls/hr Q2H30M PRN IV PATENCY;  Start 3/19/20 at 

08:56;  Stop 3/19/20 at 20:55;  Status DC


Info (PHARMACY MONITORING -- do not chart) 1 each PRN DAILY  PRN MC SEE 

COMMENTS;  Start 3/19/20 at 09:00;  Status UNV


Info (PHARMACY MONITORING -- do not chart) 1 each PRN DAILY  PRN MC SEE 

COMMENTS;  Start 3/19/20 at 09:00;  Stop 3/20/20 at 08:13;  Status DC


Digoxin (Lanoxin) 500 mcg 1X  ONCE IV  Last administered on 3/19/20at 10:04;  

Start 3/19/20 at 10:00;  Stop 3/19/20 at 10:01;  Status DC


Digoxin (Lanoxin) 125 mcg 1X  ONCE IV  Last administered on 3/19/20at 17:10;  

Start 3/19/20 at 18:00;  Stop 3/19/20 at 18:01;  Status DC


Magnesium Sulfate 100 ml @  25 mls/hr 1X  ONCE IV  Last administered on 

3/19/20at 12:48;  Start 3/19/20 at 13:00;  Stop 3/19/20 at 16:59;  Status DC


Sodium Chloride 90 meq/Magnesium Sulfate 10 meq/ Calcium Gluconate 20 meq/ 

Multivitamins 10 ml/Chromium/ Copper/Manganese/ Seleni/Zn 0.5 ml/ Total 

Parenteral Nutrition/Amino Acids/Dextrose/ Fat Emulsion Intravenous 1,512 ml @  

63 mls/hr TPN  CONT IV  Last administered on 3/19/20at 22:25;  Start 3/19/20 at 

22:00;  Stop 3/20/20 at 21:59;  Status DC


Sodium Chloride 1,000 ml @  1,000 mls/hr Q1H PRN IV hypotension;  Start 3/20/20 

at 08:05;  Stop 3/20/20 at 14:04;  Status DC


Albumin Human 200 ml @  200 mls/hr 1X  ONCE IV  Last administered on 3/20/20at 

08:57;  Start 3/20/20 at 08:15;  Stop 3/20/20 at 09:14;  Status DC


Diphenhydramine HCl (Benadryl) 25 mg 1X PRN  PRN IV ITCHING;  Start 3/20/20 at 

08:15;  Stop 3/21/20 at 08:14;  Status DC


Diphenhydramine HCl (Benadryl) 25 mg 1X PRN  PRN IV ITCHING;  Start 3/20/20 at 

08:15;  Stop 3/21/20 at 08:14;  Status DC


Sodium Chloride 1,000 ml @  400 mls/hr Q2H30M PRN IV PATENCY;  Start 3/20/20 at 

08:05;  Stop 3/20/20 at 20:04;  Status DC


Info (PHARMACY MONITORING -- do not chart) 1 each PRN DAILY  PRN MC SEE 

COMMENTS;  Start 3/20/20 at 08:15;  Stop 3/24/20 at 07:57;  Status DC


Sodium Chloride 90 meq/Potassium Chloride 15 meq/ Potassium Phosphate 10 mmol/ 

Magnesium Sulfate 10 meq/Calcium Gluconate 20 meq/ Multivitamins 10 ml/Chromium/

Copper/Manganese/ Seleni/Zn 0.5 ml/ Total Parenteral Nutrition/Amino 

Acids/Dextrose/ Fat Emulsion Intravenous 1,512 ml @  63 mls/hr TPN  CONT IV  

Last administered on 3/20/20at 21:01;  Start 3/20/20 at 22:00;  Stop 3/21/20 at 

21:59;  Status DC


Potassium Chloride/Water 100 ml @  100 mls/hr 1X  ONCE IV  Last administered on 

3/20/20at 14:09;  Start 3/20/20 at 14:00;  Stop 3/20/20 at 14:59;  Status DC


Benzocaine (Hurricaine One) 1 spray 1X  ONCE MM  Last administered on 3/20/20at 

16:38;  Start 3/20/20 at 14:30;  Stop 3/20/20 at 14:31;  Status DC


Lidocaine HCl (Glydo (Lidocaine) Jelly) 1 thomas 1X  ONCE MM  Last administered on 

3/20/20at 16:38;  Start 3/20/20 at 14:30;  Stop 3/20/20 at 14:31;  Status DC


Linezolid/Dextrose 300 ml @  300 mls/hr Q12HR IV  Last administered on 3/26/20at

21:04;  Start 3/20/20 at 20:00;  Stop 3/27/20 at 07:50;  Status DC


Acetaminophen (Tylenol) 650 mg PRN Q6HRS  PRN PO MILD PAIN / TEMP;  Start 

3/21/20 at 03:30;  Stop 3/21/20 at 03:36;  Status DC


Acetaminophen (Tylenol) 650 mg PRN Q6HRS  PRN PEG MILD PAIN / TEMP Last 

administered on 3/26/20at 07:35;  Start 3/21/20 at 03:36


Sodium Chloride 1,000 ml @  1,000 mls/hr Q1H PRN IV hypotension;  Start 3/21/20 

at 07:50;  Stop 3/21/20 at 13:49;  Status DC


Albumin Human 200 ml @  200 mls/hr 1X PRN  PRN IV Hypotension;  Start 3/21/20 at

08:00;  Stop 3/21/20 at 13:59;  Status DC


Sodium Chloride (Normal Saline Flush) 10 ml 1X PRN  PRN IV AP catheter pack;  

Start 3/21/20 at 08:00;  Stop 3/22/20 at 07:59;  Status DC


Sodium Chloride (Normal Saline Flush) 10 ml 1X PRN  PRN IV  catheter pack;  

Start 3/21/20 at 08:00;  Stop 3/22/20 at 07:59;  Status DC


Sodium Chloride 1,000 ml @  400 mls/hr Q2H30M PRN IV PATENCY;  Start 3/21/20 at 

07:50;  Stop 3/21/20 at 19:49;  Status DC


Info (PHARMACY MONITORING -- do not chart) 1 each PRN DAILY  PRN MC SEE 

COMMENTS;  Start 3/21/20 at 08:00;  Status UNV


Info (PHARMACY MONITORING -- do not chart) 1 each PRN DAILY  PRN MC SEE 

COMMENTS;  Start 3/21/20 at 08:00;  Stop 3/23/20 at 08:25;  Status DC


Sodium Chloride 90 meq/Potassium Chloride 15 meq/ Potassium Phosphate 10 mmol/ 

Magnesium Sulfate 10 meq/Calcium Gluconate 20 meq/ Multivitamins 10 ml/Chromium/

Copper/Manganese/ Seleni/Zn 0.5 ml/ Total Parenteral Nutrition/Amino 

Acids/Dextrose/ Fat Emulsion Intravenous 1,512 ml @  63 mls/hr TPN  CONT IV  

Last administered on 3/21/20at 20:57;  Start 3/21/20 at 22:00;  Stop 3/22/20 at 

21:59;  Status DC


Sodium Chloride 90 meq/Potassium Chloride 15 meq/ Potassium Phosphate 15 mmol/ 

Magnesium Sulfate 10 meq/Calcium Gluconate 20 meq/ Multivitamins 10 ml/Chromium/

Copper/Manganese/ Seleni/Zn 0.5 ml/ Total Parenteral Nutrition/Amino 

Acids/Dextrose/ Fat Emulsion Intravenous 1,512 ml @  63 mls/hr TPN  CONT IV ;  

Start 3/22/20 at 22:00;  Stop 3/22/20 at 14:16;  Status DC


Sodium Chloride 90 meq/Potassium Chloride 15 meq/ Potassium Phosphate 15 mmol/ 

Magnesium Sulfate 10 meq/Calcium Gluconate 20 meq/ Multivitamins 10 ml/Chromium/

Copper/Manganese/ Seleni/Zn 0.5 ml/ Total Parenteral Nutrition/Amino 

Acids/Dextrose/ Fat Emulsion Intravenous 1,200 ml @  50 mls/hr TPN  CONT IV ;  

Start 3/22/20 at 22:00;  Stop 3/22/20 at 14:17;  Status DC


Sodium Chloride 90 meq/Potassium Chloride 15 meq/ Potassium Phosphate 10 mmol/ 

Magnesium Sulfate 10 meq/Calcium Gluconate 20 meq/ Multivitamins 10 ml/Chromium/

Copper/Manganese/ Seleni/Zn 0.5 ml/ Total Parenteral Nutrition/Amino 

Acids/Dextrose/ Fat Emulsion Intravenous 1,200 ml @  50 mls/hr TPN  CONT IV  

Last administered on 3/22/20at 23:29;  Start 3/22/20 at 22:00;  Stop 3/23/20 at 

21:59;  Status DC


Sodium Chloride 1,000 ml @  1,000 mls/hr Q1H PRN IV hypotension;  Start 3/23/20 

at 07:28;  Stop 3/23/20 at 13:27;  Status DC


Albumin Human 200 ml @  200 mls/hr 1X  ONCE IV  Last administered on 3/23/20at 

08:51;  Start 3/23/20 at 07:30;  Stop 3/23/20 at 08:29;  Status DC


Diphenhydramine HCl (Benadryl) 25 mg 1X PRN  PRN IV ITCHING;  Start 3/23/20 at 

07:30;  Stop 3/24/20 at 07:29;  Status DC


Diphenhydramine HCl (Benadryl) 25 mg 1X PRN  PRN IV ITCHING;  Start 3/23/20 at 

07:30;  Stop 3/24/20 at 07:29;  Status DC


Sodium Chloride 1,000 ml @  400 mls/hr Q2H30M PRN IV PATENCY;  Start 3/23/20 at 

07:28;  Stop 3/23/20 at 19:27;  Status DC


Info (PHARMACY MONITORING -- do not chart) 1 each PRN DAILY  PRN MC SEE 

COMMENTS;  Start 3/23/20 at 07:30;  Stop 4/3/20 at 13:01;  Status DC


Metronidazole 100 ml @  100 mls/hr Q6HRS IV  Last administered on 4/4/20at 

15:01;  Start 3/23/20 at 08:30


Micafungin Sodium 100 mg/Dextrose 100 ml @  100 mls/hr Q24H IV  Last 

administered on 4/4/20at 08:55;  Start 3/23/20 at 09:00


Propofol 0 ml @ As Directed STK-MED ONCE IV ;  Start 3/23/20 at 07:53;  Stop 

3/23/20 at 07:53;  Status DC


Etomidate (Amidate) 20 mg STK-MED ONCE IV ;  Start 3/23/20 at 07:53;  Stop 3

/23/20 at 07:54;  Status DC


Midazolam HCl (Versed) 5 mg STK-MED ONCE .ROUTE ;  Start 3/23/20 at 07:57;  Stop

3/23/20 at 07:57;  Status DC


Fentanyl Citrate 30 ml @ 0 mls/hr CONT  PRN IV SEE PROTOCOL Last administered on

4/4/20at 13:15;  Start 3/23/20 at 08:15


Artificial Tears (Artificial Tears) 1 drop PRN Q1HR  PRN OU DRY EYE;  Start 

3/23/20 at 08:15


Midazolam HCl 50 mg/Sodium Chloride 50 ml @ 0 mls/hr CONT  PRN IV SEE PROTOCOL 

Last administered on 3/26/20at 22:39;  Start 3/23/20 at 08:15;  Stop 3/28/20 at 

15:59;  Status DC


Etomidate (Amidate) 8 mg 1X  ONCE IV  Last administered on 3/23/20at 08:33;  

Start 3/23/20 at 08:30;  Stop 3/23/20 at 08:31;  Status DC


Succinylcholine Chloride (Anectine) 120 mg 1X  ONCE IV  Last administered on 

3/23/20at 08:34;  Start 3/23/20 at 08:30;  Stop 3/23/20 at 08:31;  Status DC


Midazolam HCl (Versed) 5 mg 1X  ONCE IV ;  Start 3/23/20 at 08:30;  Stop 3/23/20

at 08:31;  Status DC


Potassium Chloride 15 meq/ Bicarbonate Dialysis Soln w/ out KCl 5,007.5 ml  @ 

1,000 mls/ hr Q5H1M IV  Last administered on 3/24/20at 11:11;  Start 3/23/20 at 

12:00;  Stop 3/24/20 at 11:15;  Status DC


Potassium Chloride 15 meq/ Bicarbonate Dialysis Soln w/ out KCl 5,007.5 ml  @ 

1,000 mls/ hr Q5H1M IV  Last administered on 3/24/20at 11:12;  Start 3/23/20 at 

12:00;  Stop 3/24/20 at 11:17;  Status DC


Potassium Chloride 15 meq/ Bicarbonate Dialysis Soln w/ out KCl 5,007.5 ml  @ 

1,000 mls/ hr Q5H1M IV  Last administered on 3/24/20at 11:11;  Start 3/23/20 at 

12:00;  Stop 3/24/20 at 11:19;  Status DC


Sodium Chloride 90 meq/Potassium Chloride 15 meq/ Potassium Phosphate 10 mmol/ 

Magnesium Sulfate 10 meq/Calcium Gluconate 20 meq/ Multivitamins 10 ml/Chromium/

Copper/Manganese/ Seleni/Zn 0.5 ml/ Total Parenteral Nutrition/Amino 

Acids/Dextrose/ Fat Emulsion Intravenous 1,400 ml @  58.333 mls/ hr TPN  CONT IV

 Last administered on 3/23/20at 21:42;  Start 3/23/20 at 22:00;  Stop 3/24/20 at

21:59;  Status DC


Heparin Sodium (Porcine) (Heparin Sodium) 5,000 unit Q8HRS SQ  Last administered

on 3/28/20at 05:55;  Start 3/23/20 at 15:00;  Stop 3/28/20 at 13:28;  Status DC


Meropenem 500 mg/ Sodium Chloride 50 ml @  100 mls/hr Q6HRS IV  Last 

administered on 3/25/20at 06:00;  Start 3/24/20 at 09:00;  Stop 3/25/20 at 

07:29;  Status DC


Potassium Phosphate 20 mmol/ Sodium Chloride 106.6667 ml @  51.667 m... 1X  ONCE

IV  Last administered on 3/24/20at 11:22;  Start 3/24/20 at 10:15;  Stop 3/24/20

at 12:18;  Status DC


Acetaminophen (Tylenol Supp) 650 mg PRN Q6HRS  PRN ME MILD PAIN / TEMP Last 

administered on 3/24/20at 10:37;  Start 3/24/20 at 10:30


Potassium Chloride/Water 100 ml @  100 mls/hr Q1H IV  Last administered on 

3/24/20at 12:12;  Start 3/24/20 at 11:00;  Stop 3/24/20 at 12:59;  Status DC


Potassium Chloride 20 meq/ Bicarbonate Dialysis Soln w/ out KCl 5,010 ml @  

1,000 mls/hr Q5H1M IV  Last administered on 3/25/20at 08:48;  Start 3/24/20 at 

12:00;  Stop 3/25/20 at 13:03;  Status DC


Potassium Chloride 20 meq/ Bicarbonate Dialysis Soln w/ out KCl 5,010 ml @  

1,000 mls/hr Q5H1M IV  Last administered on 3/29/20at 14:52;  Start 3/24/20 at 

11:30;  Stop 3/29/20 at 19:59;  Status DC


Potassium Chloride 20 meq/ Bicarbonate Dialysis Soln w/ out KCl 5,010 ml @  

1,000 mls/hr Q5H1M IV  Last administered on 3/29/20at 14:53;  Start 3/24/20 at 

11:30;  Stop 3/29/20 at 19:59;  Status DC


Sodium Chloride 90 meq/Potassium Chloride 15 meq/ Potassium Phosphate 15 mmol/ 

Magnesium Sulfate 10 meq/Calcium Gluconate 15 meq/ Multivitamins 10 ml/Chromium/

Copper/Manganese/ Seleni/Zn 0.5 ml/ Total Parenteral Nutrition/Amino 

Acids/Dextrose/ Fat Emulsion Intravenous 1,400 ml @  58.333 mls/ hr TPN  CONT IV

 Last administered on 3/24/20at 22:17;  Start 3/24/20 at 22:00;  Stop 3/25/20 at

21:59;  Status DC


Cefepime HCl (Maxipime) 2 gm Q12HR IVP  Last administered on 4/4/20at 08:56;  

Start 3/25/20 at 09:00


Daptomycin 500 mg/ Sodium Chloride 50 ml @  100 mls/hr Q48H IV  Last 

administered on 4/4/20at 15:01;  Start 3/25/20 at 08:30


Lidocaine HCl (Buffered Lidocaine 1%) 3 ml 1X  ONCE INJ  Last administered on 

3/25/20at 10:27;  Start 3/25/20 at 10:30;  Stop 3/25/20 at 10:31;  Status DC


Potassium Phosphate 20 mmol/ Sodium Chloride 106.6667 ml @  51.667 m... 1X  ONCE

IV  Last administered on 3/25/20at 12:51;  Start 3/25/20 at 13:00;  Stop 3/25/20

at 15:03;  Status DC


Sodium Chloride 90 meq/Potassium Chloride 15 meq/ Potassium Phosphate 18 mmol/ 

Magnesium Sulfate 8 meq/Calcium Gluconate 15 meq/ Multivitamins 10 ml/Chromium/ 

Copper/Manganese/ Seleni/Zn 0.5 ml/ Total Parenteral Nutrition/Amino 

Acids/Dextrose/ Fat Emulsion Intravenous 1,400 ml @  58.333 mls/ hr TPN  CONT IV

 Last administered on 3/25/20at 22:16;  Start 3/25/20 at 22:00;  Stop 3/26/20 at

21:59;  Status DC


Potassium Chloride 20 meq/ Bicarbonate Dialysis Soln w/ out KCl 5,010 ml @  

1,000 mls/hr Q5H1M IV  Last administered on 3/29/20at 14:54;  Start 3/25/20 at 

16:00;  Stop 3/29/20 at 19:59;  Status DC


Multi-Ingred Cream/Lotion/Oil/ Oint (Artificial Tears Eye Ointment) 1 thomas PRN 

Q1HR  PRN OU DRY EYE Last administered on 4/3/20at 11:05;  Start 3/25/20 at 

17:30


Sodium Chloride 90 meq/Potassium Chloride 15 meq/ Potassium Phosphate 18 mmol/ 

Magnesium Sulfate 8 meq/Calcium Gluconate 15 meq/ Multivitamins 10 ml/Chromium/ 

Copper/Manganese/ Seleni/Zn 0.5 ml/ Total Parenteral Nutrition/Amino 

Acids/Dextrose/ Fat Emulsion Intravenous 1,400 ml @  58.333 mls/ hr TPN  CONT IV

 Last administered on 3/26/20at 22:00;  Start 3/26/20 at 22:00;  Stop 3/27/20 at

21:59;  Status DC


Albumin Human 500 ml @  125 mls/hr 1X  ONCE IV ;  Start 3/26/20 at 14:15;  Stop 

3/26/20 at 18:14;  Status DC


Sodium Chloride 90 meq/Potassium Chloride 15 meq/ Potassium Phosphate 18 mmol/ 

Magnesium Sulfate 8 meq/Calcium Gluconate 15 meq/ Multivitamins 10 ml/Chromium/ 

Copper/Manganese/ Seleni/Zn 0.5 ml/ Insulin Human Regular 10 unit/ Total 

Parenteral Nutrition/Amino Acids/Dextrose/ Fat Emulsion Intravenous 1,400 ml @  

58.333 mls/ hr TPN  CONT IV  Last administered on 3/27/20at 21:43;  Start 

3/27/20 at 22:00;  Stop 3/28/20 at 21:59;  Status DC


Lidocaine HCl (Buffered Lidocaine 1%) 3 ml STK-MED ONCE .ROUTE ;  Start 3/25/20 

at 10:00;  Stop 3/27/20 at 13:57;  Status DC


Midazolam HCl 100 mg/Sodium Chloride 100 ml @ 7 mls/hr CONT  PRN IV SEE PROTOCOL

Last administered on 4/4/20at 06:07;  Start 3/28/20 at 16:00


Sodium Chloride 90 meq/Potassium Chloride 15 meq/ Potassium Phosphate 18 mmol/ 

Magnesium Sulfate 8 meq/Calcium Gluconate 15 meq/ Multivitamins 10 ml/Chromium/ 

Copper/Manganese/ Seleni/Zn 0.5 ml/ Insulin Human Regular 15 unit/ Total 

Parenteral Nutrition/Amino Acids/Dextrose/ Fat Emulsion Intravenous 1,400 ml @  

58.333 mls/ hr TPN  CONT IV  Last administered on 3/28/20at 20:34;  Start 

3/28/20 at 22:00;  Stop 3/29/20 at 21:59;  Status DC


Info ([facility] Electrolyte Protocol) 1 ea CONT PRN  PRN MC PER PROTOCOL;  Start 

3/29/20 at 13:15


Sodium Chloride 90 meq/Potassium Chloride 15 meq/ Potassium Phosphate 18 mmol/ 

Magnesium Sulfate 8 meq/Calcium Gluconate 15 meq/ Multivitamins 10 ml/Chromium/ 

Copper/Manganese/ Seleni/Zn 0.5 ml/ Insulin Human Regular 15 unit/ Total Pare

nteral Nutrition/Amino Acids/Dextrose/ Fat Emulsion Intravenous 1,400 ml @  

58.333 mls/ hr TPN  CONT IV  Last administered on 3/29/20at 22:05;  Start 

3/29/20 at 22:00;  Stop 3/30/20 at 21:59;  Status DC


Potassium Chloride 15 meq/ Bicarbonate Dialysis Soln w/ out KCl 5,007.5 ml  @ 

1,000 mls/ hr Q5H1M IV  Last administered on 4/1/20at 18:14;  Start 3/29/20 at 

20:00;  Stop 4/2/20 at 13:08;  Status DC


Potassium Chloride 15 meq/ Bicarbonate Dialysis Soln w/ out KCl 5,007.5 ml  @ 

1,000 mls/ hr Q5H1M IV  Last administered on 4/1/20at 18:14;  Start 3/29/20 at 

20:00;  Stop 4/2/20 at 13:08;  Status DC


Potassium Chloride 15 meq/ Bicarbonate Dialysis Soln w/ out KCl 5,007.5 ml  @ 

1,000 mls/ hr Q5H1M IV  Last administered on 4/1/20at 18:14;  Start 3/29/20 at 

20:00;  Stop 4/2/20 at 13:08;  Status DC


Iohexol (Omnipaque 240 Mg/ml) 30 ml 1X  ONCE PO  Last administered on 3/30/20at 

11:30;  Start 3/30/20 at 11:30;  Stop 3/30/20 at 11:33;  Status DC


Info (CONTRAST GIVEN -- Rx MONITORING) 1 each PRN DAILY  PRN MC SEE COMMENTS;  S

tart 3/30/20 at 11:45;  Stop 4/1/20 at 11:44;  Status DC


Sodium Chloride 90 meq/Potassium Chloride 15 meq/ Potassium Phosphate 18 mmol/ 

Magnesium Sulfate 8 meq/Calcium Gluconate 15 meq/ Multivitamins 10 ml/Chromium/ 

Copper/Manganese/ Seleni/Zn 0.5 ml/ Insulin Human Regular 15 unit/ Total 

Parenteral Nutrition/Amino Acids/Dextrose/ Fat Emulsion Intravenous 1,400 ml @  

58.333 mls/ hr TPN  CONT IV  Last administered on 3/30/20at 21:47;  Start 

3/30/20 at 22:00;  Stop 3/31/20 at 21:59;  Status DC


Sodium Chloride 90 meq/Potassium Chloride 15 meq/ Potassium Phosphate 18 mmol/ 

Magnesium Sulfate 8 meq/Calcium Gluconate 15 meq/ Multivitamins 10 ml/Chromium/ 

Copper/Manganese/ Seleni/Zn 0.5 ml/ Insulin Human Regular 20 unit/ Total 

Parenteral Nutrition/Amino Acids/Dextrose/ Fat Emulsion Intravenous 1,400 ml @  

58.333 mls/ hr TPN  CONT IV  Last administered on 3/31/20at 21:36;  Start 

3/31/20 at 22:00;  Stop 4/1/20 at 21:59;  Status DC


Alteplase, Recombinant (Cathflo For Central Catheter Clearance) 1 mg 1X  ONCE 

INT CAT  Last administered on 3/31/20at 20:03;  Start 3/31/20 at 19:30;  Stop 

3/31/20 at 19:46;  Status DC


Alteplase, Recombinant (Cathflo For Central Catheter Clearance) 1 mg 1X  ONCE 

INT CAT  Last administered on 3/31/20at 22:05;  Start 3/31/20 at 22:00;  Stop 

3/31/20 at 22:01;  Status DC


Sodium Chloride 90 meq/Potassium Chloride 15 meq/ Potassium Phosphate 18 mmol/ 

Magnesium Sulfate 8 meq/Calcium Gluconate 15 meq/ Multivitamins 10 ml/Chromium/ 

Copper/Manganese/ Seleni/Zn 0.5 ml/ Insulin Human Regular 20 unit/ Total 

Parenteral Nutrition/Amino Acids/Dextrose/ Fat Emulsion Intravenous 1,400 ml @  

58.333 mls/ hr TPN  CONT IV  Last administered on 4/1/20at 21:30;  Start 4/1/20 

at 22:00;  Stop 4/2/20 at 21:59;  Status DC


Dexmedetomidine HCl 400 mcg/ Sodium Chloride 100 ml @ 0 mls/hr CONT  PRN IV 

ANXIETY / AGITATION Last administered on 4/4/20at 15:25;  Start 4/2/20 at 08:15


Sodium Chloride 500 ml @  500 mls/hr 1X PRN  PRN IV ELEVATED BP, SEE COMMENTS;  

Start 4/2/20 at 08:15


Atropine Sulfate (ATROPINE 0.5mg SYRINGE) 0.5 mg PRN Q5MIN  PRN IV SEE COMMENTS;

 Start 4/2/20 at 08:15


Furosemide (Lasix) 20 mg 1X  ONCE IVP  Last administered on 4/2/20at 08:19;  

Start 4/2/20 at 08:15;  Stop 4/2/20 at 08:16;  Status DC


Lidocaine HCl (Buffered Lidocaine 1%) 3 ml STK-MED ONCE .ROUTE ;  Start 4/2/20 

at 08:39;  Stop 4/2/20 at 08:39;  Status DC


Lidocaine HCl (Buffered Lidocaine 1%) 6 ml 1X  ONCE INJ  Last administered on 

4/2/20at 09:05;  Start 4/2/20 at 09:00;  Stop 4/2/20 at 09:06;  Status DC


Sodium Chloride 90 meq/Potassium Chloride 15 meq/ Potassium Phosphate 18 mmol/ 

Magnesium Sulfate 8 meq/Calcium Gluconate 15 meq/ Multivitamins 10 ml/Chromium/ 

Copper/Manganese/ Seleni/Zn 0.5 ml/ Insulin Human Regular 20 unit/ Total 

Parenteral Nutrition/Amino Acids/Dextrose/ Fat Emulsion Intravenous 1,400 ml @  

58.333 mls/ hr TPN  CONT IV  Last administered on 4/2/20at 22:45;  Start 4/2/20 

at 22:00;  Stop 4/3/20 at 21:59;  Status DC


Sodium Chloride 1,000 ml @  1,000 mls/hr Q1H PRN IV hypotension;  Start 4/3/20 

at 07:30;  Stop 4/3/20 at 13:29;  Status DC


Albumin Human 200 ml @  200 mls/hr 1X PRN  PRN IV Hypotension Last administered 

on 4/3/20at 09:36;  Start 4/3/20 at 07:30;  Stop 4/3/20 at 13:29;  Status DC


Sodium Chloride (Normal Saline Flush) 10 ml 1X PRN  PRN IV AP catheter pack;  

Start 4/3/20 at 07:30;  Stop 4/3/20 at 21:29;  Status DC


Sodium Chloride (Normal Saline Flush) 10 ml 1X PRN  PRN IV  catheter pack;  

Start 4/3/20 at 07:30;  Stop 4/4/20 at 07:29;  Status DC


Sodium Chloride 1,000 ml @  400 mls/hr Q2H30M PRN IV PATENCY;  Start 4/3/20 at 

07:30;  Stop 4/3/20 at 19:29;  Status DC


Info (PHARMACY MONITORING -- do not chart) 1 each PRN DAILY  PRN MC SEE 

COMMENTS;  Start 4/3/20 at 07:30;  Stop 4/3/20 at 13:02;  Status DC


Info (PHARMACY MONITORING -- do not chart) 1 each PRN DAILY  PRN MC SEE 

COMMENTS;  Start 4/3/20 at 07:30


Sodium Chloride 90 meq/Potassium Chloride 15 meq/ Potassium Phosphate 10 mmol/ 

Magnesium Sulfate 8 meq/Calcium Gluconate 15 meq/ Multivitamins 10 ml/Chromium/ 

Copper/Manganese/ Seleni/Zn 0.5 ml/ Insulin Human Regular 25 unit/ Total Pa

renteral Nutrition/Amino Acids/Dextrose/ Fat Emulsion Intravenous 1,400 ml @  

58.333 mls/ hr TPN  CONT IV  Last administered on 4/3/20at 22:19;  Start 4/3/20 

at 22:00;  Stop 4/4/20 at 21:59


Heparin Sodium (Porcine) (Heparin Sodium) 5,000 unit Q12HR SQ  Last administered

on 4/4/20at 08:57;  Start 4/3/20 at 21:00


Ondansetron HCl (Zofran) 4 mg PRN Q6HRS  PRN IV NAUSEA/VOMITING;  Start 4/6/20 

at 07:00;  Stop 4/7/20 at 06:59


Fentanyl Citrate (Fentanyl 2ml Vial) 25 mcg PRN Q5MIN  PRN IV MILD PAIN 1-3;  

Start 4/6/20 at 07:00;  Stop 4/7/20 at 06:59


Fentanyl Citrate (Fentanyl 2ml Vial) 50 mcg PRN Q5MIN  PRN IV MODERATE TO SEVERE

PAIN;  Start 4/6/20 at 07:00;  Stop 4/7/20 at 06:59


Ringer's Solution 1,000 ml @  30 mls/hr Q24H IV ;  Start 4/6/20 at 07:00;  Stop 

4/6/20 at 18:59


Lidocaine HCl (Xylocaine-Mpf 1% 2ml Vial) 2 ml PRN 1X  PRN ID PRIOR TO IV START;

 Start 4/6/20 at 07:00;  Stop 4/7/20 at 06:59


Prochlorperazine Edisylate (Compazine) 5 mg PACU PRN  PRN IV NAUSEA, MRX1;  

Start 4/6/20 at 07:00;  Stop 4/7/20 at 06:59


Sodium Chloride 1,000 ml @  1,000 mls/hr Q1H PRN IV hypotension;  Start 4/4/20 

at 09:10;  Stop 4/4/20 at 15:09;  Status DC


Albumin Human 200 ml @  200 mls/hr 1X PRN  PRN IV Hypotension Last administered 

on 4/4/20at 10:10;  Start 4/4/20 at 09:15;  Stop 4/4/20 at 15:14;  Status DC


Sodium Chloride 1,000 ml @  400 mls/hr Q2H30M PRN IV PATENCY;  Start 4/4/20 at 

09:10;  Stop 4/4/20 at 21:09


Info (PHARMACY MONITORING -- do not chart) 1 each PRN DAILY  PRN MC SEE 

COMMENTS;  Start 4/4/20 at 09:15


Info (PHARMACY MONITORING -- do not chart) 1 each PRN DAILY  PRN MC SEE 

COMMENTS;  Start 4/4/20 at 09:15


Sodium Chloride 90 meq/Potassium Chloride 15 meq/ Potassium Phosphate 10 mmol/ 

Magnesium Sulfate 8 meq/Calcium Gluconate 15 meq/ Multivitamins 10 ml/Chromium/ 

Copper/Manganese/ Seleni/Zn 0.5 ml/ Insulin Human Regular 25 unit/ Total 

Parenteral Nutrition/Amino Acids/Dextrose/ Fat Emulsion Intravenous 1,400 ml @  

58.333 mls/ hr TPN  CONT IV ;  Start 4/4/20 at 22:00;  Stop 4/5/20 at 21:59





Active Scripts


Active


Reported


Bisoprolol Fumarate 5 Mg Tablet 10 Mg PO DAILY


Vitals/I & O





Vital Sign - Last 24 Hours








 4/3/20 4/3/20 4/3/20 4/3/20





 20:00 20:00 21:00 21:30


 


Temp 99.7   





 99.7   


 


Pulse 85  83 


 


Resp 25  25 


 


B/P (MAP) 84/42 (56)  94/51 (65) 


 


Pulse Ox 98  100 98


 


O2 Delivery Ventilator Mechanical Ventilator Ventilator Ventilator


 


    





    





 4/3/20 4/3/20 4/3/20 4/3/20





 22:00 22:22 22:58 23:00


 


Pulse 86   87


 


Resp 25 25 24 25


 


B/P (MAP) 110/71 (84)   104/61 (75)


 


Pulse Ox 100 97 98 100


 


O2 Delivery Ventilator Ventilator Ventilator Ventilator





 4/4/20 4/4/20 4/4/20 4/4/20





 00:00 00:00 00:50 01:00


 


Temp 101.5   





 101.5   


 


Pulse 88   88


 


Resp 25   25


 


B/P (MAP) 96/58 (71)   87/53 (64)


 


Pulse Ox 98  97 99


 


O2 Delivery Ventilator Mechanical Ventilator Ventilator Ventilator


 


    





    





 4/4/20 4/4/20 4/4/20 4/4/20





 02:00 03:00 04:00 04:00


 


Temp   101.2 





   101.2 


 


Pulse 89 91 90 


 


Resp 25 25 25 


 


B/P (MAP) 96/57 (70) 112/67 (82) 92/61 (71) 


 


Pulse Ox 97 97 97 


 


O2 Delivery Ventilator Ventilator Ventilator Mechanical Ventilator


 


    





    





 4/4/20 4/4/20 4/4/20 4/4/20





 04:50 05:00 05:35 06:00


 


Temp    98.5





    98.5


 


Pulse  90  86


 


Resp  25 25 25


 


B/P (MAP)  110/67 (81)  109/60 (76)


 


Pulse Ox 97 96 96 97


 


O2 Delivery Ventilator Ventilator Ventilator Ventilator


 


    





    





 4/4/20 4/4/20 4/4/20 4/4/20





 06:31 07:00 08:00 08:00


 


Temp    98.4





    98.4


 


Pulse  84  83


 


Resp 25 24  24


 


B/P (MAP)  116/58 (77)  112/66 (81)


 


Pulse Ox 97 98  98


 


O2 Delivery Ventilator Ventilator Mechanical Ventilator Ventilator


 


    





    





 4/4/20 4/4/20 4/4/20 4/4/20





 09:00 10:14 12:21 13:15


 


Pulse 86   


 


Resp 25   


 


B/P (MAP) 111/74 (86)   


 


Pulse Ox 98 98 99 99


 


O2 Delivery Ventilator Ventilator Ventilator Ventilator





 4/4/20 4/4/20 4/4/20 4/4/20





 14:59 15:00 16:00 16:00


 


Temp  97.8  97.2





  97.8  97.2


 


Pulse  75  75


 


Resp  24  24


 


B/P (MAP)  91/47 (62)  111/73 (86)


 


Pulse Ox 99 99  99


 


O2 Delivery Ventilator Ventilator Mechanical Ventilator Ventilator


 


    





    





 4/4/20 4/4/20  





 17:00 18:27  


 


Temp 97.8   





 97.8   


 


Pulse 76 76  


 


Resp 17 18  


 


B/P (MAP) 93/51 (65) 80/53 (62)  


 


Pulse Ox 100 100  


 


O2 Delivery Ventilator Ventilator  














Intake and Output   


 


 4/3/20 4/3/20 4/4/20





 15:00 23:00 07:00


 


Intake Total 300 ml 1071.2 ml 1375.0 ml


 


Output Total 155 ml 390 ml 180 ml


 


Balance 145 ml 681.2 ml 1195.0 ml











Hemodynamically unstable?:  No


Is patient in severe pain?:  No


Is NPO status required?:  Yes











HIRAM BARRERA MD              Apr 4, 2020 19:30

## 2020-04-04 NOTE — PDOC
Infectious Disease Note


Subjective


Subjective


Sedated and intubated, FiO2 70% PEEP 8


Fever Tmax 101.5


TPN 


Rectal tube





ROS


ROS


unobtainable





Vital Sign


Vital Signs





Vital Signs








  Date Time  Temp Pulse Resp B/P (MAP) Pulse Ox O2 Delivery O2 Flow Rate FiO2


 


4/4/20 07:00  84 24 116/58 (77) 98 Ventilator  


 


4/4/20 06:00 98.5       





 98.5       











Physical Exam


PHYSICAL EXAM


GENERAL: Orally intubated/sedated - generalized anasarca 


HEENT: Pupils equal, ETT, OGT 


NECK:  Supple 


LUNGS: Diminished aeration bases 


HEART:  S1, S2, regular 


ABDOMEN:  Distended, hypoactive BS, Rectal tube in place 


: [place]   


EXTREMITIES: Generalized edema, no cyanosis - some mottling, [place] boots & SCDs 

bilaterally 


DERMATOLOGIC:  Warm and dry.  No generalized rash.  


CENTRAL NERVOUS SYSTEM: Sedated 


RIJ Temp HDC, RUE-PICC & LIJ





Labs


Lab





Laboratory Tests








Test


 4/3/20


13:11 4/3/20


15:30 4/3/20


16:57 4/3/20


23:58


 


Glucose (Fingerstick)


 159 mg/dL


(70-99) 


 185 mg/dL


(70-99) 186 mg/dL


(70-99)


 


O2 Saturation  97 % (92-99)   


 


Arterial Blood pH


 


 7.55


(7.35-7.45) 


 





 


Arterial Blood pCO2 at


Patient Temp 


 27 mmHg


(35-46) 


 





 


Arterial Blood pO2 at Patient


Temp 


 99 mmHg


() 


 





 


Arterial Blood HCO3


 


 23 mmol/L


(21-28) 


 





 


Arterial Blood Base Excess


 


 1 mmol/L


(-3-3) 


 





 


FiO2  40   


 


Test


 4/4/20


04:40 4/4/20


05:22 


 





 


White Blood Count


 11.2 x10^3/uL


(4.0-11.0) 


 


 





 


Red Blood Count


 2.43 x10^6/uL


(3.50-5.40) 


 


 





 


Hemoglobin


 8.0 g/dL


(12.0-15.5) 


 


 





 


Hematocrit


 23.9 %


(36.0-47.0) 


 


 





 


Mean Corpuscular Volume 98 fL ()    


 


Mean Corpuscular Hemoglobin 33 pg (25-35)    


 


Mean Corpuscular Hemoglobin


Concent 33 g/dL


(31-37) 


 


 





 


Red Cell Distribution Width


 18.3 %


(11.5-14.5) 


 


 





 


Platelet Count


 209 x10^3/uL


(140-400) 


 


 





 


Sodium Level


 135 mmol/L


(136-145) 


 


 





 


Potassium Level


 4.0 mmol/L


(3.5-5.1) 


 


 





 


Chloride Level


 103 mmol/L


() 


 


 





 


Carbon Dioxide Level


 25 mmol/L


(21-32) 


 


 





 


Anion Gap 7 (6-14)    


 


Blood Urea Nitrogen


 54 mg/dL


(7-20) 


 


 





 


Creatinine


 2.0 mg/dL


(0.6-1.0) 


 


 





 


Estimated GFR


(Cockcroft-Gault) 26.5 


 


 


 





 


Glucose Level


 175 mg/dL


(70-99) 


 


 





 


Calcium Level


 8.3 mg/dL


(8.5-10.1) 


 


 





 


Phosphorus Level


 3.9 mg/dL


(2.6-4.7) 


 


 





 


Glucose (Fingerstick)


 


 183 mg/dL


(70-99) 


 











Micro








Objective


Assessment


Leukocytosis - - ? reactive - trouble with CRRT/S/p PRBCs ? intra-abdominal - 

trending down 


Fever - Flu neg antigen 3/26 ? reactive with pancreatitis vs ID - C-diff neg. 

S/p HD cath change with malfunction 3/25 - cults 3/24 neg


Lung opacities, COVID-19 neg 


Hypotension 


Acute pancreatitis, early developing necrosis -U/S 3/26 reviewed


JED,Hyperkalemia, Metabolic acidosis on HD


   - s/p RIJ temporary dialysis catheter replacement, 4/2


Anasarca - worse


Acute hypoxic resp failure, intubated


? developing peripheral ischemia - better


Cholelithiasis


Anemia - S/p PRBC 3/26


Hypocalcemia 


Prediabetes


HTN





Plan


Plan of Care


Continue Dapto/cefepime (3/25), Flagyl and micafungin (3/23) 


   -Previously on Merrem, Zyvox 


Pancultures 


CBC in am





Maintain aspiration precautions 


COVID-19 neg, 3/26  








 


D/w nursing





Critically ill


Attending Co-Sign


The patient was seen and interviewed as well as examined at the bedside. The 

chart was reviewed. The case was discussed with NP. 


Pt cont to remain febrile on broad spectrum abx


last CT from 3/30 reviewed


Agree with the plan of care.











HAVEN WINTERS APRN         Apr 4, 2020 09:26


IVAN FRANZ MD            Apr 4, 2020 14:23

## 2020-04-05 VITALS — SYSTOLIC BLOOD PRESSURE: 102 MMHG | DIASTOLIC BLOOD PRESSURE: 54 MMHG

## 2020-04-05 VITALS — DIASTOLIC BLOOD PRESSURE: 72 MMHG | SYSTOLIC BLOOD PRESSURE: 141 MMHG

## 2020-04-05 VITALS — SYSTOLIC BLOOD PRESSURE: 93 MMHG | DIASTOLIC BLOOD PRESSURE: 56 MMHG

## 2020-04-05 VITALS — DIASTOLIC BLOOD PRESSURE: 59 MMHG | SYSTOLIC BLOOD PRESSURE: 102 MMHG

## 2020-04-05 VITALS — DIASTOLIC BLOOD PRESSURE: 55 MMHG | SYSTOLIC BLOOD PRESSURE: 103 MMHG

## 2020-04-05 VITALS — DIASTOLIC BLOOD PRESSURE: 83 MMHG | SYSTOLIC BLOOD PRESSURE: 124 MMHG

## 2020-04-05 VITALS — SYSTOLIC BLOOD PRESSURE: 155 MMHG | DIASTOLIC BLOOD PRESSURE: 97 MMHG

## 2020-04-05 VITALS — DIASTOLIC BLOOD PRESSURE: 57 MMHG | SYSTOLIC BLOOD PRESSURE: 95 MMHG

## 2020-04-05 VITALS — SYSTOLIC BLOOD PRESSURE: 93 MMHG | DIASTOLIC BLOOD PRESSURE: 57 MMHG

## 2020-04-05 VITALS — DIASTOLIC BLOOD PRESSURE: 54 MMHG | SYSTOLIC BLOOD PRESSURE: 102 MMHG

## 2020-04-05 VITALS — DIASTOLIC BLOOD PRESSURE: 67 MMHG | SYSTOLIC BLOOD PRESSURE: 124 MMHG

## 2020-04-05 VITALS — DIASTOLIC BLOOD PRESSURE: 53 MMHG | SYSTOLIC BLOOD PRESSURE: 93 MMHG

## 2020-04-05 VITALS — DIASTOLIC BLOOD PRESSURE: 54 MMHG | SYSTOLIC BLOOD PRESSURE: 101 MMHG

## 2020-04-05 VITALS — DIASTOLIC BLOOD PRESSURE: 51 MMHG | SYSTOLIC BLOOD PRESSURE: 103 MMHG

## 2020-04-05 VITALS — SYSTOLIC BLOOD PRESSURE: 85 MMHG | DIASTOLIC BLOOD PRESSURE: 69 MMHG

## 2020-04-05 VITALS — SYSTOLIC BLOOD PRESSURE: 93 MMHG | DIASTOLIC BLOOD PRESSURE: 48 MMHG

## 2020-04-05 VITALS — SYSTOLIC BLOOD PRESSURE: 102 MMHG | DIASTOLIC BLOOD PRESSURE: 56 MMHG

## 2020-04-05 LAB
ALBUMIN SERPL-MCNC: 2.3 G/DL (ref 3.4–5)
ALBUMIN/GLOB SERPL: 0.9 {RATIO} (ref 1–1.7)
ALP SERPL-CCNC: 81 U/L (ref 46–116)
ALT SERPL-CCNC: 20 U/L (ref 14–59)
ANION GAP SERPL CALC-SCNC: 9 MMOL/L (ref 6–14)
AST SERPL-CCNC: 47 U/L (ref 15–37)
BASE EXCESS ABG: -2 MMOL/L (ref -3–3)
BASOPHILS # BLD AUTO: 0 X10^3/UL (ref 0–0.2)
BASOPHILS NFR BLD: 1 % (ref 0–3)
BILIRUB SERPL-MCNC: 1 MG/DL (ref 0.2–1)
BUN SERPL-MCNC: 54 MG/DL (ref 7–20)
BUN/CREAT SERPL: 28 (ref 6–20)
CALCIUM SERPL-MCNC: 8.3 MG/DL (ref 8.5–10.1)
CHLORIDE SERPL-SCNC: 104 MMOL/L (ref 98–107)
CO2 SERPL-SCNC: 26 MMOL/L (ref 21–32)
CREAT SERPL-MCNC: 1.9 MG/DL (ref 0.6–1)
EOSINOPHIL NFR BLD: 0.6 X10^3/UL (ref 0–0.7)
EOSINOPHIL NFR BLD: 7 % (ref 0–3)
ERYTHROCYTE [DISTWIDTH] IN BLOOD BY AUTOMATED COUNT: 19.5 % (ref 11.5–14.5)
GFR SERPLBLD BASED ON 1.73 SQ M-ARVRAT: 28.1 ML/MIN
GLOBULIN SER-MCNC: 2.7 G/DL (ref 2.2–3.8)
GLUCOSE SERPL-MCNC: 164 MG/DL (ref 70–99)
HCO3 BLDA-SCNC: 22 MMOL/L (ref 21–28)
HCT VFR BLD CALC: 23.8 % (ref 36–47)
HGB BLD-MCNC: 7.8 G/DL (ref 12–15.5)
INSPIRATION SETTING TIME VENT: 45
LYMPHOCYTES # BLD: 0.9 X10^3/UL (ref 1–4.8)
LYMPHOCYTES NFR BLD AUTO: 10 % (ref 24–48)
MCH RBC QN AUTO: 32 PG (ref 25–35)
MCHC RBC AUTO-ENTMCNC: 33 G/DL (ref 31–37)
MCV RBC AUTO: 98 FL (ref 79–100)
MONO #: 0.7 X10^3/UL (ref 0–1.1)
MONOCYTES NFR BLD: 7 % (ref 0–9)
NEUT #: 7.3 X10^3/UL (ref 1.8–7.7)
NEUTROPHILS NFR BLD AUTO: 76 % (ref 31–73)
PCO2 BLDA: 35 MMHG (ref 35–46)
PLATELET # BLD AUTO: 198 X10^3/UL (ref 140–400)
PO2 BLDA: 104 MMHG (ref 75–108)
POTASSIUM SERPL-SCNC: 4 MMOL/L (ref 3.5–5.1)
PROT SERPL-MCNC: 5 G/DL (ref 6.4–8.2)
RBC # BLD AUTO: 2.42 X10^6/UL (ref 3.5–5.4)
SAO2 % BLDA: 97 % (ref 92–99)
SODIUM SERPL-SCNC: 139 MMOL/L (ref 136–145)
WBC # BLD AUTO: 9.6 X10^3/UL (ref 4–11)

## 2020-04-05 RX ADMIN — DEXMEDETOMIDINE HYDROCHLORIDE PRN MLS/HR: 100 INJECTION, SOLUTION, CONCENTRATE INTRAVENOUS at 03:25

## 2020-04-05 RX ADMIN — CEFEPIME SCH GM: 2 INJECTION, POWDER, FOR SOLUTION INTRAVENOUS at 08:52

## 2020-04-05 RX ADMIN — DEXTROSE SCH MLS/HR: 50 INJECTION, SOLUTION INTRAVENOUS at 08:51

## 2020-04-05 RX ADMIN — DEXMEDETOMIDINE HYDROCHLORIDE PRN MLS/HR: 100 INJECTION, SOLUTION, CONCENTRATE INTRAVENOUS at 10:05

## 2020-04-05 RX ADMIN — PANTOPRAZOLE SODIUM SCH MG: 40 INJECTION, POWDER, FOR SOLUTION INTRAVENOUS at 07:32

## 2020-04-05 RX ADMIN — HEPARIN SODIUM SCH UNIT: 5000 INJECTION, SOLUTION INTRAVENOUS; SUBCUTANEOUS at 09:01

## 2020-04-05 RX ADMIN — DEXMEDETOMIDINE HYDROCHLORIDE PRN MLS/HR: 100 INJECTION, SOLUTION, CONCENTRATE INTRAVENOUS at 17:17

## 2020-04-05 RX ADMIN — INSULIN LISPRO SCH UNITS: 100 INJECTION, SOLUTION INTRAVENOUS; SUBCUTANEOUS at 18:00

## 2020-04-05 RX ADMIN — DEXMEDETOMIDINE HYDROCHLORIDE PRN MLS/HR: 100 INJECTION, SOLUTION, CONCENTRATE INTRAVENOUS at 13:01

## 2020-04-05 RX ADMIN — DEXMEDETOMIDINE HYDROCHLORIDE PRN MLS/HR: 100 INJECTION, SOLUTION, CONCENTRATE INTRAVENOUS at 21:17

## 2020-04-05 RX ADMIN — HEPARIN SODIUM SCH UNIT: 5000 INJECTION, SOLUTION INTRAVENOUS; SUBCUTANEOUS at 21:21

## 2020-04-05 RX ADMIN — MIDAZOLAM HYDROCHLORIDE PRN MLS/HR: 5 INJECTION, SOLUTION INTRAMUSCULAR; INTRAVENOUS at 05:57

## 2020-04-05 RX ADMIN — Medication PRN EACH: at 10:46

## 2020-04-05 RX ADMIN — CEFEPIME SCH GM: 2 INJECTION, POWDER, FOR SOLUTION INTRAVENOUS at 21:18

## 2020-04-05 RX ADMIN — DEXMEDETOMIDINE HYDROCHLORIDE PRN MLS/HR: 100 INJECTION, SOLUTION, CONCENTRATE INTRAVENOUS at 06:30

## 2020-04-05 RX ADMIN — DEXMEDETOMIDINE HYDROCHLORIDE PRN MLS/HR: 100 INJECTION, SOLUTION, CONCENTRATE INTRAVENOUS at 00:06

## 2020-04-05 RX ADMIN — DEXMEDETOMIDINE HYDROCHLORIDE PRN MLS/HR: 100 INJECTION, SOLUTION, CONCENTRATE INTRAVENOUS at 23:56

## 2020-04-05 RX ADMIN — AMIODARONE HYDROCHLORIDE PRN MLS/HR: 50 INJECTION, SOLUTION INTRAVENOUS at 21:18

## 2020-04-05 RX ADMIN — INSULIN LISPRO SCH UNITS: 100 INJECTION, SOLUTION INTRAVENOUS; SUBCUTANEOUS at 06:19

## 2020-04-05 RX ADMIN — MIDAZOLAM HYDROCHLORIDE PRN MLS/HR: 5 INJECTION, SOLUTION INTRAMUSCULAR; INTRAVENOUS at 17:13

## 2020-04-05 RX ADMIN — INSULIN LISPRO SCH UNITS: 100 INJECTION, SOLUTION INTRAVENOUS; SUBCUTANEOUS at 23:59

## 2020-04-05 RX ADMIN — INSULIN LISPRO SCH UNITS: 100 INJECTION, SOLUTION INTRAVENOUS; SUBCUTANEOUS at 11:44

## 2020-04-05 NOTE — PDOC
SUBJECTIVE


ROS


Patient remains sedated intubated on the ventilator currently. She is scheduled 

for dialysis later today.





OBJECTIVE


Vital Signs





Vital Signs








  Date Time  Temp Pulse Resp B/P (MAP) Pulse Ox O2 Delivery O2 Flow Rate FiO2


 


4/5/20 08:00     99 Ventilator  


 


4/5/20 06:00  79 18 102/54 (70)    


 


4/5/20 05:00 97.8       





 97.8       








I & 0











Intake and Output 


 


 4/5/20





 07:00


 


Intake Total 6471 ml


 


Output Total 670 ml


 


Balance 5801 ml


 


 


 


IV Total 6471 ml


 


Output Urine Total 470 ml


 


Gastric Drainage Total 200 ml











PHYSICAL EXAM


Physical Exam


Chest:    CTA Julian


Heart:    S1 S2


Abdomen -    Soft NT, ? min Distended


Extremities -    +2 Edema











ARF:   Dialysis as below





F 180 NR 4.0 Hrs





4 K 2.5 Ca 140 Na  35  HC03





Qb 350 + Qd 500+





Heparin  0 Units





Uf 3-5 Kgs or to dry weight as tolerated





May give 25-50 gms of 25% Albumin if needed to maintain Hemodynamic stability





Treatment plan reviewed and discussed with Dialysis RN





COMMENT/RELEVANT DATA


Meds





Current Medications








 Medications


  (Trade)  Dose


 Ordered  Sig/Yee  Start Time


 Stop Time Status Last Admin


Dose Admin


 


 Acetaminophen


  (Tylenol Supp)  650 mg  PRN Q6HRS  PRN  3/24/20 10:30


    3/24/20 10:37


650 MG


 


 Acetaminophen


  (Tylenol)  650 mg  PRN Q6HRS  PRN  3/21/20 03:36


    3/26/20 07:35


650 MG


 


 Albumin Human  200 ml @ 


 200 mls/hr  1X PRN  PRN  4/4/20 09:15


 4/4/20 15:14 DC 4/4/20 10:10


200 MLS/HR


 


 Albuterol Sulfate


  (Ventolin Neb


 Soln)  2.5 mg  1X  ONCE  3/17/20 22:30


 3/17/20 22:31 DC 3/18/20 00:56


2.5 MG


 


 Alteplase,


 Recombinant


  (Cathflo For


 Central Catheter


 Clearance)  1 mg  1X  ONCE  3/31/20 22:00


 3/31/20 22:01 DC 3/31/20 22:05


1 MG


 


 Artificial Tears


  (Artificial


 Tears)  1 drop  PRN Q1HR  PRN  3/23/20 08:15


     





 


 Atenolol


  (Tenormin)  100 mg  DAILY  3/17/20 09:00


 3/16/20 20:08 DC  





 


 Atropine Sulfate


  (ATROPINE 0.5mg


 SYRINGE)  0.5 mg  PRN Q5MIN  PRN  4/2/20 08:15


     





 


 Benzocaine


  (Hurricaine One)  1 spray  1X  ONCE  3/20/20 14:30


 3/20/20 14:31 DC 3/20/20 16:38


1 SPRAY


 


 Calcium Carbonate/


 Glycine


  (Tums)  500 mg  PRN AFTMEALHC  PRN  3/18/20 17:45


     





 


 Calcium Chloride


 1000 mg/Sodium


 Chloride  110 ml @ 


 220 mls/hr  1X  ONCE  3/17/20 22:30


 3/17/20 22:59 DC 3/17/20 22:11


220 MLS/HR


 


 Calcium Chloride


 3000 mg/Sodium


 Chloride  1,030 ml @ 


 50 mls/hr  [id number]  3/19/20 08:00


 3/21/20 15:23 DC 3/21/20 02:17


50 MLS/HR


 


 Calcium Gluconate


  (Calcium


 Gluconate)  2,000 mg  1X  ONCE  3/19/20 02:15


 3/19/20 02:16 DC 3/19/20 02:19


2,000 MG


 


 Calcium Gluconate


 1000 mg/Sodium


 Chloride  110 ml @ 


 220 mls/hr  1X  ONCE  3/18/20 03:30


 3/18/20 03:59 DC 3/18/20 03:21


220 MLS/HR


 


 Calcium Gluconate


 2000 mg/Sodium


 Chloride  120 ml @ 


 220 mls/hr  1X  ONCE  3/18/20 07:30


 3/18/20 08:02 DC 3/18/20 09:05


220 MLS/HR


 


 Cefepime HCl


  (Maxipime)  2 gm  Q12HR  3/25/20 09:00


    4/5/20 08:52


2 GM


 


 Daptomycin 500 mg/


 Sodium Chloride  50 ml @ 


 100 mls/hr  Q48H  3/25/20 08:30


    4/4/20 15:01


100 MLS/HR


 


 Dexmedetomidine


 HCl 400 mcg/


 Sodium Chloride  100 ml @ 0


 mls/hr  CONT  PRN  4/2/20 08:15


    4/5/20 06:30


27.8 MLS/HR


 


 Dextrose


  (Dextrose


 50%-Water Syringe)  12.5 gm  PRN Q15MIN  PRN  3/16/20 09:30


     





 


 Digoxin


  (Lanoxin)  125 mcg  1X  ONCE  3/19/20 18:00


 3/19/20 18:01 DC 3/19/20 17:10


125 MCG


 


 Diphenhydramine


 HCl


  (Benadryl)  25 mg  1X PRN  PRN  3/23/20 07:30


 3/24/20 07:29 DC  





 


 Etomidate


  (Amidate)  8 mg  1X  ONCE  3/23/20 08:30


 3/23/20 08:31 DC 3/23/20 08:33


8 MG


 


 Fentanyl Citrate


  (Fentanyl 2ml


 Vial)  50 mcg  PRN Q5MIN  PRN  4/6/20 07:00


 4/7/20 06:59   





 


 Furosemide


  (Lasix)  20 mg  1X  ONCE  4/2/20 08:15


 4/2/20 08:16 DC 4/2/20 08:19


20 MG


 


 Heparin Sodium


  (Porcine)


  (Heparin Sodium)  5,000 unit  Q12HR  4/3/20 21:00


    4/5/20 09:01


5,000 UNIT


 


 Hydromorphone HCl


  (Dilaudid)  1 mg  PRN Q3HRS  PRN  3/17/20 12:00


 3/31/20 00:25 DC 3/23/20 05:13


1 MG


 


 Info


  (CONTRAST GIVEN


 -- Rx MONITORING)  1 each  PRN DAILY  PRN  3/30/20 11:45


 4/1/20 11:44 DC  





 


 Info


  (Icu Electrolyte


 Protocol)  1 ea  CONT PRN  PRN  3/29/20 13:15


     





 


 Info


  (PHARMACY


 MONITORING -- do


 not chart)  1 each  PRN DAILY  PRN  4/4/20 09:15


     





 


 Info


  (Tpn Per


 Pharmacy)  1 each  PRN DAILY  PRN  3/18/20 12:30


   UNV  





 


 Insulin Human


 Lispro


  (HumaLOG)  0-9 UNITS  Q6HRS  3/16/20 09:30


    4/5/20 06:19


4 UNITS


 


 Insulin Human


 Regular


  (HumuLIN R VIAL)  5 unit  1X  ONCE  3/17/20 22:30


 3/17/20 22:31 DC 3/17/20 22:14


5 UNIT


 


 Iohexol


  (Omnipaque 240


 Mg/ml)  30 ml  1X  ONCE  3/30/20 11:30


 3/30/20 11:33 DC 3/30/20 11:30


30 ML


 


 Iohexol


  (Omnipaque 300


 Mg/ml)  60 ml  1X  ONCE  3/17/20 17:00


 3/17/20 17:01 DC 3/17/20 17:20


60 ML


 


 Iohexol


  (Omnipaque 350


 Mg/ml)  90 ml  1X  ONCE  3/16/20 03:30


 3/16/20 03:31 DC 3/16/20 03:25


90 ML


 


 Ketorolac


 Tromethamine


  (Toradol 30mg


 Vial)  30 mg  1X  ONCE  3/16/20 03:00


 3/16/20 03:01 DC 3/16/20 02:54


30 MG


 


 Lidocaine HCl


  (Buffered


 Lidocaine 1%)  6 ml  1X  ONCE  4/2/20 09:00


 4/2/20 09:06 DC 4/2/20 09:05


6 ML


 


 Lidocaine HCl


  (Glydo


  (Lidocaine) Jelly)  1 thomas  1X  ONCE  3/20/20 14:30


 3/20/20 14:31 DC 3/20/20 16:38


1 THOMAS


 


 Lidocaine HCl


  (Xylocaine-Mpf


 1% 2ml Vial)  2 ml  PRN 1X  PRN  4/6/20 07:00


 4/7/20 06:59   





 


 Linezolid/Dextrose  300 ml @ 


 300 mls/hr  Q12HR  3/20/20 20:00


 3/27/20 07:50 DC 3/26/20 21:04


300 MLS/HR


 


 Lorazepam


  (Ativan Inj)  1 mg  PRN Q4HRS  PRN  3/19/20 09:00


    3/23/20 00:34


1 MG


 


 Magnesium Sulfate  100 ml @ 


 25 mls/hr  1X  ONCE  3/19/20 13:00


 3/19/20 16:59 DC 3/19/20 12:48


25 MLS/HR


 


 Meropenem 1 gm/


 Sodium Chloride  100 ml @ 


 200 mls/hr  Q8HRS  3/17/20 20:00


 3/18/20 08:48 DC 3/18/20 05:45


200 MLS/HR


 


 Meropenem 500 mg/


 Sodium Chloride  50 ml @ 


 100 mls/hr  Q6HRS  3/24/20 09:00


 3/25/20 07:29 DC 3/25/20 06:00


100 MLS/HR


 


 Metoprolol


 Tartrate


  (Lopressor Vial)  5 mg  Q6HRS  3/17/20 10:15


 3/28/20 08:48 DC 3/26/20 00:12


5 MG


 


 Metronidazole  100 ml @ 


 100 mls/hr  Q6HRS  3/23/20 08:30


    4/5/20 05:57


100 MLS/HR


 


 Micafungin Sodium


 100 mg/Dextrose  100 ml @ 


 100 mls/hr  Q24H  3/23/20 09:00


    4/5/20 08:51


100 MLS/HR


 


 Midazolam HCl


  (Versed)  5 mg  1X  ONCE  3/23/20 08:30


 3/23/20 08:31 DC  





 


 Midazolam HCl 100


 mg/Sodium Chloride  100 ml @ 7


 mls/hr  CONT  PRN  3/28/20 16:00


    4/5/20 05:57


7 MLS/HR


 


 Midazolam HCl 50


 mg/Sodium Chloride  50 ml @ 0


 mls/hr  CONT  PRN  3/23/20 08:15


 3/28/20 15:59 DC 3/26/20 22:39


7 MLS/HR


 


 Morphine Sulfate


  (Morphine


 Sulfate)  2 mg  PRN Q2HR  PRN  3/16/20 05:00


 3/17/20 14:15 DC 3/17/20 12:26


2 MG


 


 Multi-Ingred


 Cream/Lotion/Oil/


 Oint


  (Artificial


 Tears Eye


 Ointment)  1 thomas  PRN Q1HR  PRN  3/25/20 17:30


    4/3/20 11:05


1 THOMAS


 


 Norepinephrine


 Bitartrate 8 mg/


 Dextrose  258 ml @ 


 17.299 mls/


 hr  CONT  PRN  3/17/20 15:30


    4/3/20 07:51


4.1 MLS/HR


 


 Ondansetron HCl


  (Zofran)  4 mg  PRN Q6HRS  PRN  4/6/20 07:00


 4/7/20 06:59   





 


 Pantoprazole


 Sodium


  (PROTONIX VIAL


 for IV PUSH)  40 mg  DAILYAC  3/16/20 11:30


    4/5/20 07:32


40 MG


 


 Piperacillin Sod/


 Tazobactam Sod


 4.5 gm/Sodium


 Chloride  100 ml @ 


 200 mls/hr  1X  ONCE  3/16/20 06:00


 3/16/20 06:29 DC 3/16/20 05:44


200 MLS/HR


 


 Potassium


 Chloride 15 meq/


 Bicarbonate


 Dialysis Soln w/


 out KCl  5,007.5 ml


  @ 1,000 mls/


 hr  Q5H1M  3/29/20 20:00


 4/2/20 13:08 DC 4/1/20 18:14


1,000 MLS/HR


 


 Potassium


 Chloride 20 meq/


 Bicarbonate


 Dialysis Soln w/


 out KCl  5,010 ml @ 


 1,000 mls/hr  Q5H1M  3/25/20 16:00


 3/29/20 19:59 DC 3/29/20 14:54


1,000 MLS/HR


 


 Potassium


 Chloride/Water  100 ml @ 


 100 mls/hr  Q1H  3/24/20 11:00


 3/24/20 12:59 DC 3/24/20 12:12


100 MLS/HR


 


 Potassium


 Phosphate 20 mmol/


 Sodium Chloride  106.6667


 ml @ 


 51.667 m...  1X  ONCE  3/25/20 13:00


 3/25/20 15:03 DC 3/25/20 12:51


51.667 MLS/HR


 


 Prochlorperazine


 Edisylate


  (Compazine)  5 mg  PACU PRN  PRN  4/6/20 07:00


 4/7/20 06:59   





 


 Propofol  0 ml @ As


 Directed  STK-MED ONCE  3/23/20 07:53


 3/23/20 07:53 DC  





 


 Ringer's Solution  1,000 ml @ 


 30 mls/hr  Q24H  4/6/20 07:00


 4/6/20 18:59   





 


 Sodium


 Bicarbonate 50


 meq/Sodium


 Chloride  1,050 ml @ 


 75 mls/hr  Q14H  3/18/20 07:30


 3/23/20 10:28 DC 3/22/20 21:10


75 MLS/HR


 


 Sodium Chloride


  (Normal Saline


 Flush)  10 ml  1X PRN  PRN  4/3/20 07:30


 4/4/20 07:29 DC  





 


 Sodium Chloride


 90 meq/Calcium


 Gluconate 10 meq/


 Multivitamins 10


 ml/Chromium/


 Copper/Manganese/


 Seleni/Zn 0.5 ml/


 Total Parenteral


 Nutrition/Amino


 Acids/Dextrose/


 Fat Emulsion


 Intravenous  1,512 ml @ 


 63 mls/hr  TPN  CONT  3/18/20 22:00


 3/19/20 21:59 DC 3/18/20 22:06


63 MLS/HR


 


 Sodium Chloride


 90 meq/Calcium


 Gluconate 10 meq/


 Multivitamins 10


 ml/Chromium/


 Copper/Manganese/


 Seleni/Zn 1 ml/


 Total Parenteral


 Nutrition/Amino


 Acids/Dextrose/


 Fat Emulsion


 Intravenous  55.005 ml


  @ 2.292


 mls/hr  TPN  CONT  3/18/20 22:00


 3/18/20 12:33 DC  





 


 Sodium Chloride


 90 meq/Magnesium


 Sulfate 10 meq/


 Calcium Gluconate


 20 meq/


 Multivitamins 10


 ml/Chromium/


 Copper/Manganese/


 Seleni/Zn 0.5 ml/


 Total Parenteral


 Nutrition/Amino


 Acids/Dextrose/


 Fat Emulsion


 Intravenous  1,512 ml @ 


 63 mls/hr  TPN  CONT  3/19/20 22:00


 3/20/20 21:59 DC 3/19/20 22:25


63 MLS/HR


 


 Sodium Chloride


 90 meq/Potassium


 Chloride 15 meq/


 Potassium


 Phosphate 10 mmol/


 Magnesium Sulfate


 8 meq/Calcium


 Gluconate 15 meq/


 Multivitamins 10


 ml/Chromium/


 Copper/Manganese/


 Seleni/Zn 0.5 ml/


 Insulin Human


 Regular 25 unit/


 Total Parenteral


 Nutrition/Amino


 Acids/Dextrose/


 Fat Emulsion


 Intravenous  1,400 ml @ 


 58.333 mls/


 hr  TPN  CONT  4/4/20 22:00


 4/5/20 21:59  4/4/20 22:10


58.333 MLS/HR


 


 Sodium Chloride


 90 meq/Potassium


 Chloride 15 meq/


 Potassium


 Phosphate 10 mmol/


 Magnesium Sulfate


 10 meq/Calcium


 Gluconate 20 meq/


 Multivitamins 10


 ml/Chromium/


 Copper/Manganese/


 Seleni/Zn 0.5 ml/


 Total Parenteral


 Nutrition/Amino


 Acids/Dextrose/


 Fat Emulsion


 Intravenous  1,400 ml @ 


 58.333 mls/


 hr  TPN  CONT  3/23/20 22:00


 3/24/20 21:59 DC 3/23/20 21:42


58.333 MLS/HR


 


 Sodium Chloride


 90 meq/Potassium


 Chloride 15 meq/


 Potassium


 Phosphate 15 mmol/


 Magnesium Sulfate


 10 meq/Calcium


 Gluconate 15 meq/


 Multivitamins 10


 ml/Chromium/


 Copper/Manganese/


 Seleni/Zn 0.5 ml/


 Total Parenteral


 Nutrition/Amino


 Acids/Dextrose/


 Fat Emulsion


 Intravenous  1,400 ml @ 


 58.333 mls/


 hr  TPN  CONT  3/24/20 22:00


 3/25/20 21:59 DC 3/24/20 22:17


58.333 MLS/HR


 


 Sodium Chloride


 90 meq/Potassium


 Chloride 15 meq/


 Potassium


 Phosphate 15 mmol/


 Magnesium Sulfate


 10 meq/Calcium


 Gluconate 20 meq/


 Multivitamins 10


 ml/Chromium/


 Copper/Manganese/


 Seleni/Zn 0.5 ml/


 Total Parenteral


 Nutrition/Amino


 Acids/Dextrose/


 Fat Emulsion


 Intravenous  1,200 ml @ 


 50 mls/hr  TPN  CONT  3/22/20 22:00


 3/22/20 14:17 DC  





 


 Sodium Chloride


 90 meq/Potassium


 Chloride 15 meq/


 Potassium


 Phosphate 18 mmol/


 Magnesium Sulfate


 8 meq/Calcium


 Gluconate 15 meq/


 Multivitamins 10


 ml/Chromium/


 Copper/Manganese/


 Seleni/Zn 0.5 ml/


 Insulin Human


 Regular 10 unit/


 Total Parenteral


 Nutrition/Amino


 Acids/Dextrose/


 Fat Emulsion


 Intravenous  1,400 ml @ 


 58.333 mls/


 hr  TPN  CONT  3/27/20 22:00


 3/28/20 21:59 DC 3/27/20 21:43


58.333 MLS/HR


 


 Sodium Chloride


 90 meq/Potassium


 Chloride 15 meq/


 Potassium


 Phosphate 18 mmol/


 Magnesium Sulfate


 8 meq/Calcium


 Gluconate 15 meq/


 Multivitamins 10


 ml/Chromium/


 Copper/Manganese/


 Seleni/Zn 0.5 ml/


 Insulin Human


 Regular 15 unit/


 Total Parenteral


 Nutrition/Amino


 Acids/Dextrose/


 Fat Emulsion


 Intravenous  1,400 ml @ 


 58.333 mls/


 hr  TPN  CONT  3/30/20 22:00


 3/31/20 21:59 DC 3/30/20 21:47


58.333 MLS/HR


 


 Sodium Chloride


 90 meq/Potassium


 Chloride 15 meq/


 Potassium


 Phosphate 18 mmol/


 Magnesium Sulfate


 8 meq/Calcium


 Gluconate 15 meq/


 Multivitamins 10


 ml/Chromium/


 Copper/Manganese/


 Seleni/Zn 0.5 ml/


 Insulin Human


 Regular 20 unit/


 Total Parenteral


 Nutrition/Amino


 Acids/Dextrose/


 Fat Emulsion


 Intravenous  1,400 ml @ 


 58.333 mls/


 hr  TPN  CONT  4/2/20 22:00


 4/3/20 21:59 DC 4/2/20 22:45


58.333 MLS/HR


 


 Sodium Chloride


 90 meq/Potassium


 Chloride 15 meq/


 Potassium


 Phosphate 18 mmol/


 Magnesium Sulfate


 8 meq/Calcium


 Gluconate 15 meq/


 Multivitamins 10


 ml/Chromium/


 Copper/Manganese/


 Seleni/Zn 0.5 ml/


 Total Parenteral


 Nutrition/Amino


 Acids/Dextrose/


 Fat Emulsion


 Intravenous  1,400 ml @ 


 58.333 mls/


 hr  TPN  CONT  3/26/20 22:00


 3/27/20 21:59 DC 3/26/20 22:00


58.333 MLS/HR


 


 Succinylcholine


 Chloride


  (Anectine)  120 mg  1X  ONCE  3/23/20 08:30


 3/23/20 08:31 DC 3/23/20 08:34


120 MG








Lab





Laboratory Tests








Test


 4/4/20


14:54 4/4/20


17:50 4/4/20


18:38 4/4/20


23:52


 


Urine Collection Type Unknown    


 


Urine Color Red    


 


Urine Clarity Cloudy    


 


Urine pH 5.0 (<5.0-8.0)    


 


Urine Specific Gravity


 1.025


(1.000-1.030) 


 


 





 


Urine Protein


 100 mg/dL


(NEG-TRACE) 


 


 





 


Urine Glucose (UA)


 Negative mg/dL


(NEG) 


 


 





 


Urine Ketones (Stick)


 Trace mg/dL


(NEG) 


 


 





 


Urine Blood Large (NEG)    


 


Urine Nitrite Positive (NEG)    


 


Urine Bilirubin Small (NEG)    


 


Urine Urobilinogen Dipstick


 0.2 mg/dL (0.2


mg/dL) 


 


 





 


Urine Leukocyte Esterase Small (NEG)    


 


Urine RBC


 Rare /HPF


(0-2) 


 


 





 


Urine WBC 1-4 /HPF (0-4)    


 


Urine Squamous Epithelial


Cells Occ /LPF 


 


 


 





 


Urine Amorphous Sediment Present /HPF    


 


Urine Bacteria 0 /HPF (0-FEW)    


 


O2 Saturation  98 % (92-99)   


 


Arterial Blood pH


 


 7.48


(7.35-7.45) 


 





 


Arterial Blood pCO2 at


Patient Temp 


 30 mmHg


(35-46) 


 





 


Arterial Blood pO2 at Patient


Temp 


 122 mmHg


() 


 





 


Arterial Blood HCO3


 


 22 mmol/L


(21-28) 


 





 


Arterial Blood Base Excess


 


 -1 mmol/L


(-3-3) 


 





 


FiO2  50   


 


Glucose (Fingerstick)


 


 


 171 mg/dL


(70-99) 173 mg/dL


(70-99)


 


Test


 4/5/20


06:00 4/5/20


06:17 


 





 


White Blood Count


 9.6 x10^3/uL


(4.0-11.0) 


 


 





 


Red Blood Count


 2.42 x10^6/uL


(3.50-5.40) 


 


 





 


Hemoglobin


 7.8 g/dL


(12.0-15.5) 


 


 





 


Hematocrit


 23.8 %


(36.0-47.0) 


 


 





 


Mean Corpuscular Volume 98 fL ()    


 


Mean Corpuscular Hemoglobin 32 pg (25-35)    


 


Mean Corpuscular Hemoglobin


Concent 33 g/dL


(31-37) 


 


 





 


Red Cell Distribution Width


 19.5 %


(11.5-14.5) 


 


 





 


Platelet Count


 198 x10^3/uL


(140-400) 


 


 





 


Neutrophils (%) (Auto) 76 % (31-73)    


 


Lymphocytes (%) (Auto) 10 % (24-48)    


 


Monocytes (%) (Auto) 7 % (0-9)    


 


Eosinophils (%) (Auto) 7 % (0-3)    


 


Basophils (%) (Auto) 1 % (0-3)    


 


Neutrophils # (Auto)


 7.3 x10^3/uL


(1.8-7.7) 


 


 





 


Lymphocytes # (Auto)


 0.9 x10^3/uL


(1.0-4.8) 


 


 





 


Monocytes # (Auto)


 0.7 x10^3/uL


(0.0-1.1) 


 


 





 


Eosinophils # (Auto)


 0.6 x10^3/uL


(0.0-0.7) 


 


 





 


Basophils # (Auto)


 0.0 x10^3/uL


(0.0-0.2) 


 


 





 


Glucose (Fingerstick)


 


 167 mg/dL


(70-99) 


 











Results


All relevant outside records, renal labs, imaging studies, telemetry/EKG's were 

reviewed.











TIMMY FRANZ MD                Apr 5, 2020 09:12

## 2020-04-05 NOTE — PDOC
PULMONARY PROGRESS NOTES


Subjective


Patient intubated on 3/23 , sedated


Currently on assist control ventilation  450 tidal volume 8 of PEEP ,40%FIO2 , 


no overnight concerns from nursing, remains on levophed low dose


Vitals





Vital Signs








  Date Time  Temp Pulse Resp B/P (MAP) Pulse Ox O2 Delivery O2 Flow Rate FiO2


 


4/5/20 08:00     99 Ventilator  


 


4/5/20 06:00  79 18 102/54 (70)    


 


4/5/20 05:00 97.8       





 97.8       








Comments


intubated/sedated


Lungs:  Other (dimished in BLL)


Cardiovascular:  S1, S2


Abdomen:  Soft, Non-tender


Extremities:  Other (+3 generalized edema )


Skin:  Warm, Dry


Labs





Laboratory Tests








Test


 4/3/20


13:11 4/3/20


15:30 4/3/20


16:57 4/3/20


23:58


 


Glucose (Fingerstick)


 159 mg/dL


(70-99) 


 185 mg/dL


(70-99) 186 mg/dL


(70-99)


 


O2 Saturation  97 % (92-99)   


 


Arterial Blood pH


 


 7.55


(7.35-7.45) 


 





 


Arterial Blood pCO2 at


Patient Temp 


 27 mmHg


(35-46) 


 





 


Arterial Blood pO2 at Patient


Temp 


 99 mmHg


() 


 





 


Arterial Blood HCO3


 


 23 mmol/L


(21-28) 


 





 


Arterial Blood Base Excess


 


 1 mmol/L


(-3-3) 


 





 


FiO2  40   


 


Test


 4/4/20


04:40 4/4/20


05:22 4/4/20


14:54 4/4/20


17:50


 


White Blood Count


 11.2 x10^3/uL


(4.0-11.0) 


 


 





 


Red Blood Count


 2.43 x10^6/uL


(3.50-5.40) 


 


 





 


Hemoglobin


 8.0 g/dL


(12.0-15.5) 


 


 





 


Hematocrit


 23.9 %


(36.0-47.0) 


 


 





 


Mean Corpuscular Volume 98 fL ()    


 


Mean Corpuscular Hemoglobin 33 pg (25-35)    


 


Mean Corpuscular Hemoglobin


Concent 33 g/dL


(31-37) 


 


 





 


Red Cell Distribution Width


 18.3 %


(11.5-14.5) 


 


 





 


Platelet Count


 209 x10^3/uL


(140-400) 


 


 





 


Sodium Level


 135 mmol/L


(136-145) 


 


 





 


Potassium Level


 4.0 mmol/L


(3.5-5.1) 


 


 





 


Chloride Level


 103 mmol/L


() 


 


 





 


Carbon Dioxide Level


 25 mmol/L


(21-32) 


 


 





 


Anion Gap 7 (6-14)    


 


Blood Urea Nitrogen


 54 mg/dL


(7-20) 


 


 





 


Creatinine


 2.0 mg/dL


(0.6-1.0) 


 


 





 


Estimated GFR


(Cockcroft-Gault) 26.5 


 


 


 





 


Glucose Level


 175 mg/dL


(70-99) 


 


 





 


Calcium Level


 8.3 mg/dL


(8.5-10.1) 


 


 





 


Phosphorus Level


 3.9 mg/dL


(2.6-4.7) 


 


 





 


Glucose (Fingerstick)


 


 183 mg/dL


(70-99) 


 





 


Urine Collection Type   Unknown  


 


Urine Color   Red  


 


Urine Clarity   Cloudy  


 


Urine pH   5.0 (<5.0-8.0)  


 


Urine Specific Gravity


 


 


 1.025


(1.000-1.030) 





 


Urine Protein


 


 


 100 mg/dL


(NEG-TRACE) 





 


Urine Glucose (UA)


 


 


 Negative mg/dL


(NEG) 





 


Urine Ketones (Stick)


 


 


 Trace mg/dL


(NEG) 





 


Urine Blood   Large (NEG)  


 


Urine Nitrite   Positive (NEG)  


 


Urine Bilirubin   Small (NEG)  


 


Urine Urobilinogen Dipstick


 


 


 0.2 mg/dL (0.2


mg/dL) 





 


Urine Leukocyte Esterase   Small (NEG)  


 


Urine RBC


 


 


 Rare /HPF


(0-2) 





 


Urine WBC   1-4 /HPF (0-4)  


 


Urine Squamous Epithelial


Cells 


 


 Occ /LPF 


 





 


Urine Amorphous Sediment   Present /HPF  


 


Urine Bacteria   0 /HPF (0-FEW)  


 


O2 Saturation    98 % (92-99) 


 


Arterial Blood pH


 


 


 


 7.48


(7.35-7.45)


 


Arterial Blood pCO2 at


Patient Temp 


 


 


 30 mmHg


(35-46)


 


Arterial Blood pO2 at Patient


Temp 


 


 


 122 mmHg


()


 


Arterial Blood HCO3


 


 


 


 22 mmol/L


(21-28)


 


Arterial Blood Base Excess


 


 


 


 -1 mmol/L


(-3-3)


 


FiO2    50 


 


Test


 4/4/20


18:38 4/4/20


23:52 4/5/20


06:00 4/5/20


06:17


 


Glucose (Fingerstick)


 171 mg/dL


(70-99) 173 mg/dL


(70-99) 


 167 mg/dL


(70-99)


 


White Blood Count


 


 


 9.6 x10^3/uL


(4.0-11.0) 





 


Red Blood Count


 


 


 2.42 x10^6/uL


(3.50-5.40) 





 


Hemoglobin


 


 


 7.8 g/dL


(12.0-15.5) 





 


Hematocrit


 


 


 23.8 %


(36.0-47.0) 





 


Mean Corpuscular Volume   98 fL ()  


 


Mean Corpuscular Hemoglobin   32 pg (25-35)  


 


Mean Corpuscular Hemoglobin


Concent 


 


 33 g/dL


(31-37) 





 


Red Cell Distribution Width


 


 


 19.5 %


(11.5-14.5) 





 


Platelet Count


 


 


 198 x10^3/uL


(140-400) 





 


Neutrophils (%) (Auto)   76 % (31-73)  


 


Lymphocytes (%) (Auto)   10 % (24-48)  


 


Monocytes (%) (Auto)   7 % (0-9)  


 


Eosinophils (%) (Auto)   7 % (0-3)  


 


Basophils (%) (Auto)   1 % (0-3)  


 


Neutrophils # (Auto)


 


 


 7.3 x10^3/uL


(1.8-7.7) 





 


Lymphocytes # (Auto)


 


 


 0.9 x10^3/uL


(1.0-4.8) 





 


Monocytes # (Auto)


 


 


 0.7 x10^3/uL


(0.0-1.1) 





 


Eosinophils # (Auto)


 


 


 0.6 x10^3/uL


(0.0-0.7) 





 


Basophils # (Auto)


 


 


 0.0 x10^3/uL


(0.0-0.2) 





 


Test


 4/5/20


08:00 


 


 





 


O2 Saturation 97 % (92-99)    


 


Arterial Blood pH


 7.42


(7.35-7.45) 


 


 





 


Arterial Blood pCO2 at


Patient Temp 35 mmHg


(35-46) 


 


 





 


Arterial Blood pO2 at Patient


Temp 104 mmHg


() 


 


 





 


Arterial Blood HCO3


 22 mmol/L


(21-28) 


 


 





 


Arterial Blood Base Excess


 -2 mmol/L


(-3-3) 


 


 





 


FiO2 45    








Laboratory Tests








Test


 4/4/20


14:54 4/4/20


17:50 4/4/20


18:38 4/4/20


23:52


 


Urine Collection Type Unknown    


 


Urine Color Red    


 


Urine Clarity Cloudy    


 


Urine pH 5.0 (<5.0-8.0)    


 


Urine Specific Gravity


 1.025


(1.000-1.030) 


 


 





 


Urine Protein


 100 mg/dL


(NEG-TRACE) 


 


 





 


Urine Glucose (UA)


 Negative mg/dL


(NEG) 


 


 





 


Urine Ketones (Stick)


 Trace mg/dL


(NEG) 


 


 





 


Urine Blood Large (NEG)    


 


Urine Nitrite Positive (NEG)    


 


Urine Bilirubin Small (NEG)    


 


Urine Urobilinogen Dipstick


 0.2 mg/dL (0.2


mg/dL) 


 


 





 


Urine Leukocyte Esterase Small (NEG)    


 


Urine RBC


 Rare /HPF


(0-2) 


 


 





 


Urine WBC 1-4 /HPF (0-4)    


 


Urine Squamous Epithelial


Cells Occ /LPF 


 


 


 





 


Urine Amorphous Sediment Present /HPF    


 


Urine Bacteria 0 /HPF (0-FEW)    


 


O2 Saturation  98 % (92-99)   


 


Arterial Blood pH


 


 7.48


(7.35-7.45) 


 





 


Arterial Blood pCO2 at


Patient Temp 


 30 mmHg


(35-46) 


 





 


Arterial Blood pO2 at Patient


Temp 


 122 mmHg


() 


 





 


Arterial Blood HCO3


 


 22 mmol/L


(21-28) 


 





 


Arterial Blood Base Excess


 


 -1 mmol/L


(-3-3) 


 





 


FiO2  50   


 


Glucose (Fingerstick)


 


 


 171 mg/dL


(70-99) 173 mg/dL


(70-99)


 


Test


 4/5/20


06:00 4/5/20


06:17 4/5/20


08:00 





 


White Blood Count


 9.6 x10^3/uL


(4.0-11.0) 


 


 





 


Red Blood Count


 2.42 x10^6/uL


(3.50-5.40) 


 


 





 


Hemoglobin


 7.8 g/dL


(12.0-15.5) 


 


 





 


Hematocrit


 23.8 %


(36.0-47.0) 


 


 





 


Mean Corpuscular Volume 98 fL ()    


 


Mean Corpuscular Hemoglobin 32 pg (25-35)    


 


Mean Corpuscular Hemoglobin


Concent 33 g/dL


(31-37) 


 


 





 


Red Cell Distribution Width


 19.5 %


(11.5-14.5) 


 


 





 


Platelet Count


 198 x10^3/uL


(140-400) 


 


 





 


Neutrophils (%) (Auto) 76 % (31-73)    


 


Lymphocytes (%) (Auto) 10 % (24-48)    


 


Monocytes (%) (Auto) 7 % (0-9)    


 


Eosinophils (%) (Auto) 7 % (0-3)    


 


Basophils (%) (Auto) 1 % (0-3)    


 


Neutrophils # (Auto)


 7.3 x10^3/uL


(1.8-7.7) 


 


 





 


Lymphocytes # (Auto)


 0.9 x10^3/uL


(1.0-4.8) 


 


 





 


Monocytes # (Auto)


 0.7 x10^3/uL


(0.0-1.1) 


 


 





 


Eosinophils # (Auto)


 0.6 x10^3/uL


(0.0-0.7) 


 


 





 


Basophils # (Auto)


 0.0 x10^3/uL


(0.0-0.2) 


 


 





 


Glucose (Fingerstick)


 


 167 mg/dL


(70-99) 


 





 


O2 Saturation   97 % (92-99)  


 


Arterial Blood pH


 


 


 7.42


(7.35-7.45) 





 


Arterial Blood pCO2 at


Patient Temp 


 


 35 mmHg


(35-46) 





 


Arterial Blood pO2 at Patient


Temp 


 


 104 mmHg


() 





 


Arterial Blood HCO3


 


 


 22 mmol/L


(21-28) 





 


Arterial Blood Base Excess


 


 


 -2 mmol/L


(-3-3) 





 


FiO2   45  








Medications





Active Scripts








 Medications  Dose


 Route/Sig


 Max Daily Dose Days Date Category


 


 Bisoprolol


 Fumarate 5 Mg


 Tablet  10 Mg


 PO DAILY


   3/16/20 Reported








Comments


Chest x-ray reviewed 4/2, poor ins film, basal effusions and volume loss


CT chest 3/30


reviewed





Impression


.


IMPRESSION:


1.  Acute hypoxemic respiratory failure secondary to ARDS due to acute 

pancreatitis, septic shock, abdominal distention, and pneumonia.and pleural 

effusions.  Pleural effusions secondary to abdominal process and/or general 

volume overload from renal failure.


2.  Gallstone pancreatitis. WITH NECROSIS


3.  Severe metabolic acidosis.stable


4.  Acute kidney injury. ON CRRT


5.  Acute gallstone pancreatitis.


6.  Hypoalbuminemia.


7.  Hypocalcemia.


8.  Leukocytosis


9.  Chronic anemia


10. Covid 19 testing negative


11. Acute /chronic anemia suspected hemorrhagic fluid in pelvis


12, Fever per ID





Plan


.


AC mode, sedation vacation and attempt CPAP trial 


Transfuse prn, check H/H 


Not a surgical candidate per surgery


Antibiotics per ID, 


Follow nephrology input,HD per renal


Nutritional support with TPN


Prognosis is guarded





DVT/GI PPX 


d/w RN/RT


FULL CODE











SHARYN SOLORZANO MD                  Apr 5, 2020 10:05

## 2020-04-05 NOTE — NUR
Pharmacy TPN Dosing Note



S: JESENIA RICH is a 49 year old F Currently receiving Central Continuous TPN started 
03/18/20



B:Pertinent PMH: Necrotizing pancreatitis

Height: 5 feet, 8 inches

Weight: 111.4 kg



Current diet: NPO 



LABS:

Sodium:    139 

Potassium: 4 

Chloride:  104 

Calcium:   8.2 

Corrected Calcium: 9.96 

Magnesium: 2.1 

CO2:       26 

SCr:       1.9 

Glucose:   164-173 

Albumin:   1.8 

AST:       47 

ALT:       20 



TPN FORMULA:

TPN TYPE:  Central Continuous

AMINO ACIDS:         125 gm

DEXTROSE:            195 gm

LIPIDS:              40 gm

SODIUM CHLORIDE:     90 mEq

POTASSIUM CHLORIDE:  15 mEq

POTASSIUM PHOSPHATE: 10 mmol

MAGNESIUM:           8 mEq

CALCIUM:             15 mEq

INSULIN:             25 units

MULTIPLE VITAMIN:    10 ml

TRACE ELEMENTS:      0.5 ml(s)



TPN PLAN:  

Continue same TPN.

-BMP, Phos and Mag in the AM.





R: Continue TPN as yesterday.

Will monitor electrolytes, glucose, and tolerance to TPN.



 LORENZO LESLIE [facility], 04/05/20 1046

## 2020-04-05 NOTE — PDOC
SAURAV NAIR APRN 4/5/20 1118:


SURGICAL PROGRESS NOTE


Subjective


vent, sedated


Vital Signs





Vital Signs








  Date Time  Temp Pulse Resp B/P (MAP) Pulse Ox O2 Delivery O2 Flow Rate FiO2


 


4/5/20 10:00  80 17 93/57 (69) 99 Ventilator  


 


4/5/20 08:00 99.5       





 99.5       








I&O











Intake and Output 


 


 4/5/20





 07:00


 


Intake Total 6471 ml


 


Output Total 695 ml


 


Balance 5776 ml


 


 


 


IV Total 6471 ml


 


Output Urine Total 495 ml


 


Gastric Drainage Total 200 ml








PATIENT HAS A VILLASENOR:  Yes


HEENT:  Other (og bilious )


Abdomen:  Soft, Other (distended )


Labs





Laboratory Tests








Test


 4/3/20


13:11 4/3/20


15:30 4/3/20


16:57 4/3/20


23:58


 


Glucose (Fingerstick)


 159 mg/dL


(70-99) 


 185 mg/dL


(70-99) 186 mg/dL


(70-99)


 


O2 Saturation  97 % (92-99)   


 


Arterial Blood pH


 


 7.55


(7.35-7.45) 


 





 


Arterial Blood pCO2 at


Patient Temp 


 27 mmHg


(35-46) 


 





 


Arterial Blood pO2 at Patient


Temp 


 99 mmHg


() 


 





 


Arterial Blood HCO3


 


 23 mmol/L


(21-28) 


 





 


Arterial Blood Base Excess


 


 1 mmol/L


(-3-3) 


 





 


FiO2  40   


 


Test


 4/4/20


04:40 4/4/20


05:22 4/4/20


14:54 4/4/20


17:50


 


White Blood Count


 11.2 x10^3/uL


(4.0-11.0) 


 


 





 


Red Blood Count


 2.43 x10^6/uL


(3.50-5.40) 


 


 





 


Hemoglobin


 8.0 g/dL


(12.0-15.5) 


 


 





 


Hematocrit


 23.9 %


(36.0-47.0) 


 


 





 


Mean Corpuscular Volume 98 fL ()    


 


Mean Corpuscular Hemoglobin 33 pg (25-35)    


 


Mean Corpuscular Hemoglobin


Concent 33 g/dL


(31-37) 


 


 





 


Red Cell Distribution Width


 18.3 %


(11.5-14.5) 


 


 





 


Platelet Count


 209 x10^3/uL


(140-400) 


 


 





 


Sodium Level


 135 mmol/L


(136-145) 


 


 





 


Potassium Level


 4.0 mmol/L


(3.5-5.1) 


 


 





 


Chloride Level


 103 mmol/L


() 


 


 





 


Carbon Dioxide Level


 25 mmol/L


(21-32) 


 


 





 


Anion Gap 7 (6-14)    


 


Blood Urea Nitrogen


 54 mg/dL


(7-20) 


 


 





 


Creatinine


 2.0 mg/dL


(0.6-1.0) 


 


 





 


Estimated GFR


(Cockcroft-Gault) 26.5 


 


 


 





 


Glucose Level


 175 mg/dL


(70-99) 


 


 





 


Calcium Level


 8.3 mg/dL


(8.5-10.1) 


 


 





 


Phosphorus Level


 3.9 mg/dL


(2.6-4.7) 


 


 





 


Glucose (Fingerstick)


 


 183 mg/dL


(70-99) 


 





 


Urine Collection Type   Unknown  


 


Urine Color   Red  


 


Urine Clarity   Cloudy  


 


Urine pH   5.0 (<5.0-8.0)  


 


Urine Specific Gravity


 


 


 1.025


(1.000-1.030) 





 


Urine Protein


 


 


 100 mg/dL


(NEG-TRACE) 





 


Urine Glucose (UA)


 


 


 Negative mg/dL


(NEG) 





 


Urine Ketones (Stick)


 


 


 Trace mg/dL


(NEG) 





 


Urine Blood   Large (NEG)  


 


Urine Nitrite   Positive (NEG)  


 


Urine Bilirubin   Small (NEG)  


 


Urine Urobilinogen Dipstick


 


 


 0.2 mg/dL (0.2


mg/dL) 





 


Urine Leukocyte Esterase   Small (NEG)  


 


Urine RBC


 


 


 Rare /HPF


(0-2) 





 


Urine WBC   1-4 /HPF (0-4)  


 


Urine Squamous Epithelial


Cells 


 


 Occ /LPF 


 





 


Urine Amorphous Sediment   Present /HPF  


 


Urine Bacteria   0 /HPF (0-FEW)  


 


O2 Saturation    98 % (92-99) 


 


Arterial Blood pH


 


 


 


 7.48


(7.35-7.45)


 


Arterial Blood pCO2 at


Patient Temp 


 


 


 30 mmHg


(35-46)


 


Arterial Blood pO2 at Patient


Temp 


 


 


 122 mmHg


()


 


Arterial Blood HCO3


 


 


 


 22 mmol/L


(21-28)


 


Arterial Blood Base Excess


 


 


 


 -1 mmol/L


(-3-3)


 


FiO2    50 


 


Test


 4/4/20


18:38 4/4/20


23:52 4/5/20


06:00 4/5/20


06:17


 


Glucose (Fingerstick)


 171 mg/dL


(70-99) 173 mg/dL


(70-99) 


 167 mg/dL


(70-99)


 


White Blood Count


 


 


 9.6 x10^3/uL


(4.0-11.0) 





 


Red Blood Count


 


 


 2.42 x10^6/uL


(3.50-5.40) 





 


Hemoglobin


 


 


 7.8 g/dL


(12.0-15.5) 





 


Hematocrit


 


 


 23.8 %


(36.0-47.0) 





 


Mean Corpuscular Volume   98 fL ()  


 


Mean Corpuscular Hemoglobin   32 pg (25-35)  


 


Mean Corpuscular Hemoglobin


Concent 


 


 33 g/dL


(31-37) 





 


Red Cell Distribution Width


 


 


 19.5 %


(11.5-14.5) 





 


Platelet Count


 


 


 198 x10^3/uL


(140-400) 





 


Neutrophils (%) (Auto)   76 % (31-73)  


 


Lymphocytes (%) (Auto)   10 % (24-48)  


 


Monocytes (%) (Auto)   7 % (0-9)  


 


Eosinophils (%) (Auto)   7 % (0-3)  


 


Basophils (%) (Auto)   1 % (0-3)  


 


Neutrophils # (Auto)


 


 


 7.3 x10^3/uL


(1.8-7.7) 





 


Lymphocytes # (Auto)


 


 


 0.9 x10^3/uL


(1.0-4.8) 





 


Monocytes # (Auto)


 


 


 0.7 x10^3/uL


(0.0-1.1) 





 


Eosinophils # (Auto)


 


 


 0.6 x10^3/uL


(0.0-0.7) 





 


Basophils # (Auto)


 


 


 0.0 x10^3/uL


(0.0-0.2) 





 


Sodium Level


 


 


 139 mmol/L


(136-145) 





 


Potassium Level


 


 


 4.0 mmol/L


(3.5-5.1) 





 


Chloride Level


 


 


 104 mmol/L


() 





 


Carbon Dioxide Level


 


 


 26 mmol/L


(21-32) 





 


Anion Gap   9 (6-14)  


 


Blood Urea Nitrogen


 


 


 54 mg/dL


(7-20) 





 


Creatinine


 


 


 1.9 mg/dL


(0.6-1.0) 





 


Estimated GFR


(Cockcroft-Gault) 


 


 28.1 


 





 


BUN/Creatinine Ratio   28 (6-20)  


 


Glucose Level


 


 


 164 mg/dL


(70-99) 





 


Calcium Level


 


 


 8.3 mg/dL


(8.5-10.1) 





 


Total Bilirubin


 


 


 1.0 mg/dL


(0.2-1.0) 





 


Aspartate Amino Transf


(AST/SGOT) 


 


 47 U/L (15-37) 


 





 


Alanine Aminotransferase


(ALT/SGPT) 


 


 20 U/L (14-59) 


 





 


Alkaline Phosphatase


 


 


 81 U/L


() 





 


Total Protein


 


 


 5.0 g/dL


(6.4-8.2) 





 


Albumin


 


 


 2.3 g/dL


(3.4-5.0) 





 


Albumin/Globulin Ratio   0.9 (1.0-1.7)  


 


Test


 4/5/20


08:00 


 


 





 


O2 Saturation 97 % (92-99)    


 


Arterial Blood pH


 7.42


(7.35-7.45) 


 


 





 


Arterial Blood pCO2 at


Patient Temp 35 mmHg


(35-46) 


 


 





 


Arterial Blood pO2 at Patient


Temp 104 mmHg


() 


 


 





 


Arterial Blood HCO3


 22 mmol/L


(21-28) 


 


 





 


Arterial Blood Base Excess


 -2 mmol/L


(-3-3) 


 


 





 


FiO2 45    








Laboratory Tests








Test


 4/4/20


14:54 4/4/20


17:50 4/4/20


18:38 4/4/20


23:52


 


Urine Collection Type Unknown    


 


Urine Color Red    


 


Urine Clarity Cloudy    


 


Urine pH 5.0 (<5.0-8.0)    


 


Urine Specific Gravity


 1.025


(1.000-1.030) 


 


 





 


Urine Protein


 100 mg/dL


(NEG-TRACE) 


 


 





 


Urine Glucose (UA)


 Negative mg/dL


(NEG) 


 


 





 


Urine Ketones (Stick)


 Trace mg/dL


(NEG) 


 


 





 


Urine Blood Large (NEG)    


 


Urine Nitrite Positive (NEG)    


 


Urine Bilirubin Small (NEG)    


 


Urine Urobilinogen Dipstick


 0.2 mg/dL (0.2


mg/dL) 


 


 





 


Urine Leukocyte Esterase Small (NEG)    


 


Urine RBC


 Rare /HPF


(0-2) 


 


 





 


Urine WBC 1-4 /HPF (0-4)    


 


Urine Squamous Epithelial


Cells Occ /LPF 


 


 


 





 


Urine Amorphous Sediment Present /HPF    


 


Urine Bacteria 0 /HPF (0-FEW)    


 


O2 Saturation  98 % (92-99)   


 


Arterial Blood pH


 


 7.48


(7.35-7.45) 


 





 


Arterial Blood pCO2 at


Patient Temp 


 30 mmHg


(35-46) 


 





 


Arterial Blood pO2 at Patient


Temp 


 122 mmHg


() 


 





 


Arterial Blood HCO3


 


 22 mmol/L


(21-28) 


 





 


Arterial Blood Base Excess


 


 -1 mmol/L


(-3-3) 


 





 


FiO2  50   


 


Glucose (Fingerstick)


 


 


 171 mg/dL


(70-99) 173 mg/dL


(70-99)


 


Test


 4/5/20


06:00 4/5/20


06:17 4/5/20


08:00 





 


White Blood Count


 9.6 x10^3/uL


(4.0-11.0) 


 


 





 


Red Blood Count


 2.42 x10^6/uL


(3.50-5.40) 


 


 





 


Hemoglobin


 7.8 g/dL


(12.0-15.5) 


 


 





 


Hematocrit


 23.8 %


(36.0-47.0) 


 


 





 


Mean Corpuscular Volume 98 fL ()    


 


Mean Corpuscular Hemoglobin 32 pg (25-35)    


 


Mean Corpuscular Hemoglobin


Concent 33 g/dL


(31-37) 


 


 





 


Red Cell Distribution Width


 19.5 %


(11.5-14.5) 


 


 





 


Platelet Count


 198 x10^3/uL


(140-400) 


 


 





 


Neutrophils (%) (Auto) 76 % (31-73)    


 


Lymphocytes (%) (Auto) 10 % (24-48)    


 


Monocytes (%) (Auto) 7 % (0-9)    


 


Eosinophils (%) (Auto) 7 % (0-3)    


 


Basophils (%) (Auto) 1 % (0-3)    


 


Neutrophils # (Auto)


 7.3 x10^3/uL


(1.8-7.7) 


 


 





 


Lymphocytes # (Auto)


 0.9 x10^3/uL


(1.0-4.8) 


 


 





 


Monocytes # (Auto)


 0.7 x10^3/uL


(0.0-1.1) 


 


 





 


Eosinophils # (Auto)


 0.6 x10^3/uL


(0.0-0.7) 


 


 





 


Basophils # (Auto)


 0.0 x10^3/uL


(0.0-0.2) 


 


 





 


Sodium Level


 139 mmol/L


(136-145) 


 


 





 


Potassium Level


 4.0 mmol/L


(3.5-5.1) 


 


 





 


Chloride Level


 104 mmol/L


() 


 


 





 


Carbon Dioxide Level


 26 mmol/L


(21-32) 


 


 





 


Anion Gap 9 (6-14)    


 


Blood Urea Nitrogen


 54 mg/dL


(7-20) 


 


 





 


Creatinine


 1.9 mg/dL


(0.6-1.0) 


 


 





 


Estimated GFR


(Cockcroft-Gault) 28.1 


 


 


 





 


BUN/Creatinine Ratio 28 (6-20)    


 


Glucose Level


 164 mg/dL


(70-99) 


 


 





 


Calcium Level


 8.3 mg/dL


(8.5-10.1) 


 


 





 


Total Bilirubin


 1.0 mg/dL


(0.2-1.0) 


 


 





 


Aspartate Amino Transf


(AST/SGOT) 47 U/L (15-37) 


 


 


 





 


Alanine Aminotransferase


(ALT/SGPT) 20 U/L (14-59) 


 


 


 





 


Alkaline Phosphatase


 81 U/L


() 


 


 





 


Total Protein


 5.0 g/dL


(6.4-8.2) 


 


 





 


Albumin


 2.3 g/dL


(3.4-5.0) 


 


 





 


Albumin/Globulin Ratio 0.9 (1.0-1.7)    


 


Glucose (Fingerstick)


 


 167 mg/dL


(70-99) 


 





 


O2 Saturation   97 % (92-99)  


 


Arterial Blood pH


 


 


 7.42


(7.35-7.45) 





 


Arterial Blood pCO2 at


Patient Temp 


 


 35 mmHg


(35-46) 





 


Arterial Blood pO2 at Patient


Temp 


 


 104 mmHg


() 





 


Arterial Blood HCO3


 


 


 22 mmol/L


(21-28) 





 


Arterial Blood Base Excess


 


 


 -2 mmol/L


(-3-3) 





 


FiO2   45  








Problem List


Problems


Medical Problems:


(1) Acute pancreatitis


Status: Acute  





(2) Cholelithiasis


Status: Acute  








Assessment/Plan


supportive measures


tentative trach 4/6 with GAMAL Ruiz MD 4/5/20 1519:


SURGICAL PROGRESS NOTE


Assessment/Plan


Pt seen and examined.


Agree with Ms. Nair's note


Pt currently on dialysis, tolerating


abd soft


cont supportive care


plan tracheostomy in AM.











SAURAV NAIR APRN             Apr 5, 2020 11:18


GAMAL ZHOU MD              Apr 5, 2020 15:19

## 2020-04-05 NOTE — NUR
pt returned back to room per bed from dialysis. 5 Liters were removed during the dialysis 
run. pt tolerated well. able to decrease levophed to 8 cc/hour.  spoke with pts 2 daughters 
today on telephone. 

telephone consent from Glenna to have dr palomino place a tracheostomy on pt tomorrow. 
questions answered for daughters.

400 cc of geen-brown colored return from og tube.

## 2020-04-06 VITALS — SYSTOLIC BLOOD PRESSURE: 100 MMHG | DIASTOLIC BLOOD PRESSURE: 58 MMHG

## 2020-04-06 VITALS — DIASTOLIC BLOOD PRESSURE: 54 MMHG | SYSTOLIC BLOOD PRESSURE: 88 MMHG

## 2020-04-06 VITALS — SYSTOLIC BLOOD PRESSURE: 93 MMHG | DIASTOLIC BLOOD PRESSURE: 67 MMHG

## 2020-04-06 VITALS — DIASTOLIC BLOOD PRESSURE: 72 MMHG | SYSTOLIC BLOOD PRESSURE: 115 MMHG

## 2020-04-06 VITALS — DIASTOLIC BLOOD PRESSURE: 61 MMHG | SYSTOLIC BLOOD PRESSURE: 97 MMHG

## 2020-04-06 VITALS — SYSTOLIC BLOOD PRESSURE: 83 MMHG | DIASTOLIC BLOOD PRESSURE: 51 MMHG

## 2020-04-06 VITALS — DIASTOLIC BLOOD PRESSURE: 71 MMHG | SYSTOLIC BLOOD PRESSURE: 112 MMHG

## 2020-04-06 VITALS — SYSTOLIC BLOOD PRESSURE: 120 MMHG | DIASTOLIC BLOOD PRESSURE: 73 MMHG

## 2020-04-06 VITALS — DIASTOLIC BLOOD PRESSURE: 72 MMHG | SYSTOLIC BLOOD PRESSURE: 95 MMHG

## 2020-04-06 VITALS — SYSTOLIC BLOOD PRESSURE: 111 MMHG | DIASTOLIC BLOOD PRESSURE: 71 MMHG

## 2020-04-06 VITALS — DIASTOLIC BLOOD PRESSURE: 73 MMHG | SYSTOLIC BLOOD PRESSURE: 117 MMHG

## 2020-04-06 VITALS — DIASTOLIC BLOOD PRESSURE: 65 MMHG | SYSTOLIC BLOOD PRESSURE: 104 MMHG

## 2020-04-06 VITALS — SYSTOLIC BLOOD PRESSURE: 109 MMHG | DIASTOLIC BLOOD PRESSURE: 67 MMHG

## 2020-04-06 VITALS — DIASTOLIC BLOOD PRESSURE: 60 MMHG | SYSTOLIC BLOOD PRESSURE: 82 MMHG

## 2020-04-06 VITALS — DIASTOLIC BLOOD PRESSURE: 77 MMHG | SYSTOLIC BLOOD PRESSURE: 123 MMHG

## 2020-04-06 VITALS — DIASTOLIC BLOOD PRESSURE: 82 MMHG | SYSTOLIC BLOOD PRESSURE: 188 MMHG

## 2020-04-06 VITALS — SYSTOLIC BLOOD PRESSURE: 73 MMHG | DIASTOLIC BLOOD PRESSURE: 49 MMHG

## 2020-04-06 VITALS — SYSTOLIC BLOOD PRESSURE: 111 MMHG | DIASTOLIC BLOOD PRESSURE: 64 MMHG

## 2020-04-06 VITALS — SYSTOLIC BLOOD PRESSURE: 91 MMHG | DIASTOLIC BLOOD PRESSURE: 58 MMHG

## 2020-04-06 VITALS — DIASTOLIC BLOOD PRESSURE: 65 MMHG | SYSTOLIC BLOOD PRESSURE: 103 MMHG

## 2020-04-06 VITALS — DIASTOLIC BLOOD PRESSURE: 61 MMHG | SYSTOLIC BLOOD PRESSURE: 95 MMHG

## 2020-04-06 VITALS — SYSTOLIC BLOOD PRESSURE: 96 MMHG | DIASTOLIC BLOOD PRESSURE: 53 MMHG

## 2020-04-06 VITALS — DIASTOLIC BLOOD PRESSURE: 73 MMHG | SYSTOLIC BLOOD PRESSURE: 112 MMHG

## 2020-04-06 LAB
ALBUMIN SERPL-MCNC: 2.6 G/DL (ref 3.4–5)
ANION GAP SERPL CALC-SCNC: 11 MMOL/L (ref 6–14)
BASE EXCESS ABG: -1 MMOL/L (ref -3–3)
BUN SERPL-MCNC: 48 MG/DL (ref 7–20)
CALCIUM SERPL-MCNC: 8.6 MG/DL (ref 8.5–10.1)
CHLORIDE SERPL-SCNC: 103 MMOL/L (ref 98–107)
CO2 SERPL-SCNC: 25 MMOL/L (ref 21–32)
CREAT SERPL-MCNC: 1.8 MG/DL (ref 0.6–1)
GFR SERPLBLD BASED ON 1.73 SQ M-ARVRAT: 29.9 ML/MIN
GLUCOSE SERPL-MCNC: 167 MG/DL (ref 70–99)
HCO3 BLDA-SCNC: 24 MMOL/L (ref 21–28)
INSPIRATION SETTING TIME VENT: 40
MAGNESIUM SERPL-MCNC: 1.8 MG/DL (ref 1.8–2.4)
PCO2 BLDA: 37 MMHG (ref 35–46)
PHOSPHATE SERPL-MCNC: 3.9 MG/DL (ref 2.6–4.7)
PO2 BLDA: 105 MMHG (ref 75–108)
POTASSIUM SERPL-SCNC: 4.2 MMOL/L (ref 3.5–5.1)
SAO2 % BLDA: 97 % (ref 92–99)
SODIUM SERPL-SCNC: 139 MMOL/L (ref 136–145)

## 2020-04-06 PROCEDURE — 0B110F4 BYPASS TRACHEA TO CUTANEOUS WITH TRACHEOSTOMY DEVICE, OPEN APPROACH: ICD-10-PCS | Performed by: SURGERY

## 2020-04-06 PROCEDURE — 0BH17EZ INSERTION OF ENDOTRACHEAL AIRWAY INTO TRACHEA, VIA NATURAL OR ARTIFICIAL OPENING: ICD-10-PCS | Performed by: SURGERY

## 2020-04-06 RX ADMIN — HEPARIN SODIUM SCH UNIT: 5000 INJECTION, SOLUTION INTRAVENOUS; SUBCUTANEOUS at 21:48

## 2020-04-06 RX ADMIN — INSULIN LISPRO SCH UNITS: 100 INJECTION, SOLUTION INTRAVENOUS; SUBCUTANEOUS at 19:10

## 2020-04-06 RX ADMIN — DEXMEDETOMIDINE HYDROCHLORIDE PRN MLS/HR: 100 INJECTION, SOLUTION, CONCENTRATE INTRAVENOUS at 13:14

## 2020-04-06 RX ADMIN — DEXMEDETOMIDINE HYDROCHLORIDE PRN MLS/HR: 100 INJECTION, SOLUTION, CONCENTRATE INTRAVENOUS at 15:31

## 2020-04-06 RX ADMIN — Medication PRN EACH: at 11:10

## 2020-04-06 RX ADMIN — DEXMEDETOMIDINE HYDROCHLORIDE PRN MLS/HR: 100 INJECTION, SOLUTION, CONCENTRATE INTRAVENOUS at 19:27

## 2020-04-06 RX ADMIN — MIDAZOLAM HYDROCHLORIDE PRN MLS/HR: 5 INJECTION, SOLUTION INTRAMUSCULAR; INTRAVENOUS at 13:10

## 2020-04-06 RX ADMIN — DEXMEDETOMIDINE HYDROCHLORIDE PRN MLS/HR: 100 INJECTION, SOLUTION, CONCENTRATE INTRAVENOUS at 05:39

## 2020-04-06 RX ADMIN — DEXMEDETOMIDINE HYDROCHLORIDE PRN MLS/HR: 100 INJECTION, SOLUTION, CONCENTRATE INTRAVENOUS at 09:19

## 2020-04-06 RX ADMIN — MIDAZOLAM HYDROCHLORIDE PRN MLS/HR: 5 INJECTION, SOLUTION INTRAMUSCULAR; INTRAVENOUS at 00:40

## 2020-04-06 RX ADMIN — INSULIN LISPRO SCH UNITS: 100 INJECTION, SOLUTION INTRAVENOUS; SUBCUTANEOUS at 06:18

## 2020-04-06 RX ADMIN — CEFEPIME SCH GM: 2 INJECTION, POWDER, FOR SOLUTION INTRAVENOUS at 21:47

## 2020-04-06 RX ADMIN — DEXMEDETOMIDINE HYDROCHLORIDE PRN MLS/HR: 100 INJECTION, SOLUTION, CONCENTRATE INTRAVENOUS at 16:02

## 2020-04-06 RX ADMIN — PANTOPRAZOLE SODIUM SCH MG: 40 INJECTION, POWDER, FOR SOLUTION INTRAVENOUS at 08:34

## 2020-04-06 RX ADMIN — DEXMEDETOMIDINE HYDROCHLORIDE PRN MLS/HR: 100 INJECTION, SOLUTION, CONCENTRATE INTRAVENOUS at 02:00

## 2020-04-06 RX ADMIN — CEFEPIME SCH GM: 2 INJECTION, POWDER, FOR SOLUTION INTRAVENOUS at 08:36

## 2020-04-06 RX ADMIN — DEXMEDETOMIDINE HYDROCHLORIDE PRN MLS/HR: 100 INJECTION, SOLUTION, CONCENTRATE INTRAVENOUS at 23:31

## 2020-04-06 RX ADMIN — INSULIN LISPRO SCH UNITS: 100 INJECTION, SOLUTION INTRAVENOUS; SUBCUTANEOUS at 11:23

## 2020-04-06 RX ADMIN — DAPTOMYCIN SCH MLS/HR: 500 INJECTION, POWDER, LYOPHILIZED, FOR SOLUTION INTRAVENOUS at 08:35

## 2020-04-06 RX ADMIN — DEXTROSE SCH MLS/HR: 50 INJECTION, SOLUTION INTRAVENOUS at 08:36

## 2020-04-06 RX ADMIN — MIDAZOLAM HYDROCHLORIDE PRN MLS/HR: 5 INJECTION, SOLUTION INTRAMUSCULAR; INTRAVENOUS at 16:01

## 2020-04-06 RX ADMIN — HEPARIN SODIUM SCH UNIT: 5000 INJECTION, SOLUTION INTRAVENOUS; SUBCUTANEOUS at 08:49

## 2020-04-06 NOTE — PDOC
PROGRESS NOTES


Assessment


Assessment


Neurology Progress Note


4-3-20





Respiratory failure.


Seizure.


Metabolic encephalopathy.


Fever 102.7 degree.


Metabolic acidosis.


Diffuse pulmonary infiltrate.


Pleural effusion.


Pancreatitis


Gallstone.


Leukocytosis.


Electrolytes imbalances.


Hyperglycemia.


DM.


HTN.


HLD.


Anemia.


Obesity.





RECOMMENDATIONS/PLAN:


Continue life support in CCU at the present time.


Keppra if has further seizures.


Treat medical diseases.





EEG last week: No seizure activity.





OBJECTIVE:


4/2/20: No seizures reported over night.





Past Medical History


Cardiovascular:  HTN, Hyperlipidemia


Endocrine:  Diabetes





Family History


Unobtainable.





Social HistoryU


not obtainable





Allergies


Coded Allergies:  


Codeine (Verified  Allergy, Intermediate, rash, 3/16/20)





ROS


Unobtainable in unresponsive state.





PHYSICAL EXAMINATION:   


General appearance in sub acute distress.  


HEENT:  Normocephalic and nontraumatic.  Eyes, nose, ears, and throat are 

unremarkable.


Neck is supple. No lymphadenopathy. 


Cardiovascular:  S1, S2.  


Pulmonary:  On vent.. 


Abdomen:  Bowel sounds are weak.  


Extremities:  No rash, lesions. Edema noted in hands.





NEUROLOGICAL  EXAMINATION:


Minimal responsiveness.


On vent.


Not oriented to time, place and person.


PERRL.


EOMI not elicited,


CN: no acute focal findings.


Muscle tone: within normal.


Muscle strength: No movements to stimuli.


DTR: 0-1


Plantar reflex: No response bilaterally 


Gait: not able to walk.


Sensory exam: no response to stimuli..


Not able to access cerebellar signs.


F-T-N test not performed due to unresponsiveness





Objective


Objective





Vital Signs








  Date Time  Temp Pulse Resp B/P (MAP) Pulse Ox O2 Delivery O2 Flow Rate FiO2


 


4/6/20 13:40   19  96 Ventilator  


 


4/6/20 11:00 97.9 76  103/65 (78)    





 97.9       














Intake and Output 


 


 4/6/20





 07:00


 


Intake Total 2032.2 ml


 


Output Total 687 ml


 


Balance 1345.2 ml


 


 


 


Intake Oral 0 ml


 


IV Total 2032.2 ml


 


Output Urine Total 287 ml


 


Gastric Drainage Total 400 ml











Vitals Signs


Vitals





                          VS - Last 72 Hours, by Label








  Date Time  Temp Pulse Resp B/P (MAP) Pulse Ox O2 Delivery O2 Flow Rate FiO2


 


4/6/20 13:40   19  96 Ventilator  


 


4/6/20 12:30     100 Ventilator  


 


4/6/20 11:00 97.9 76 18 103/65 (78) 99 Ventilator  





 97.9       


 


4/6/20 10:00  76 18 93/67 (76) 99 Ventilator  


 


4/6/20 09:00  77 18 95/72 (80) 99 Ventilator  


 


4/6/20 08:00  76 18 100/58 (72) 99 Ventilator  


 


4/6/20 07:48      Mechanical Ventilator  


 


4/6/20 07:39     100 Ventilator  


 


4/6/20 07:00 97.7 77 18 104/65 (78) 100 Ventilator  





 97.7       


 


4/6/20 06:26   18  99 Ventilator  


 


4/6/20 06:00  78 18 115/72 (86) 98 Ventilator  


 


4/6/20 05:40   18  100 Ventilator  


 


4/6/20 05:00  79 18 96/53 (67) 100 Ventilator  


 


4/6/20 04:45     100 Ventilator  


 


4/6/20 04:00 98.4 79 18 88/54 (65) 100 Ventilator  





 98.4       


 


4/6/20 04:00      Mechanical Ventilator  


 


4/6/20 03:00  79 18 91/58 (69) 99 Ventilator  


 


4/6/20 02:02     100 Ventilator  


 


4/6/20 02:00  79 18 111/64 (80) 100 Ventilator  


 


4/6/20 01:00  79 18 95/61 (72) 100 Ventilator  


 


4/6/20 00:00 99.2 83 18 111/71 (84) 100 Ventilator  





 99.2       


 


4/6/20 00:00      Mechanical Ventilator  


 


4/5/20 23:27     100 Ventilator  


 


4/5/20 23:00  85 18 124/83 (97) 99 Ventilator  


 


4/5/20 22:09   24  98 Ventilator  


 


4/5/20 22:00  87 18  100 Ventilator  


 


4/5/20 21:25   17  100 Ventilator  


 


4/5/20 21:00  86 18  98 Ventilator  


 


4/5/20 20:26     100 Ventilator  


 


4/5/20 20:00      Mechanical Ventilator  


 


4/5/20 20:00 98.7 82 18 124/67 (86) 98 Ventilator  





 98.7       


 


4/5/20 19:00  84 18 155/97 (116) 97 Ventilator  


 


4/5/20 18:10     100 Ventilator  


 


4/5/20 18:00 98.4 85 23 141/72 (95) 99 Ventilator  





 98.4       


 


4/5/20 15:25     100 Ventilator  


 


4/5/20 14:33   25  100 Ventilator  


 


4/5/20 13:56     100 Ventilator  


 


4/5/20 12:00 99.4 81 17 103/55 (71) 100 Ventilator  





 99.4       


 


4/5/20 12:00      Mechanical Ventilator  


 


4/5/20 11:35     100 Ventilator  


 


4/5/20 11:00  80 18 95/57 (70) 100 Ventilator  


 


4/5/20 10:00  80 17 93/57 (69) 99 Ventilator  


 


4/5/20 09:00  80 25 103/51 (68) 99 Ventilator  


 


4/5/20 08:00 99.5 79 18 102/59 (73) 99 Ventilator  





 99.5       


 


4/5/20 08:00      Mechanical Ventilator  


 


4/5/20 08:00     99 Ventilator  


 


4/5/20 07:00  78 18 102/54 (70) 99 Ventilator  











Laboratory


Laboratory





Laboratory Tests








Test


 4/5/20


18:09 4/5/20


23:55 4/6/20


05:26 4/6/20


05:45


 


Glucose (Fingerstick)


 133 mg/dL


(70-99) 158 mg/dL


(70-99) 157 mg/dL


(70-99) 





 


Hemoglobin


 


 


 


 7.8 g/dL


(12.0-15.5)


 


Sodium Level


 


 


 


 139 mmol/L


(136-145)


 


Potassium Level


 


 


 


 4.2 mmol/L


(3.5-5.1)


 


Chloride Level


 


 


 


 103 mmol/L


()


 


Carbon Dioxide Level


 


 


 


 25 mmol/L


(21-32)


 


Anion Gap    11 (6-14) 


 


Blood Urea Nitrogen


 


 


 


 48 mg/dL


(7-20)


 


Creatinine


 


 


 


 1.8 mg/dL


(0.6-1.0)


 


Estimated GFR


(Cockcroft-Gault) 


 


 


 29.9 





 


Glucose Level


 


 


 


 167 mg/dL


(70-99)


 


Calcium Level


 


 


 


 8.6 mg/dL


(8.5-10.1)


 


Phosphorus Level


 


 


 


 3.9 mg/dL


(2.6-4.7)


 


Magnesium Level


 


 


 


 1.8 mg/dL


(1.8-2.4)


 


Albumin


 


 


 


 2.6 g/dL


(3.4-5.0)


 


Test


 4/6/20


07:40 4/6/20


11:21 


 





 


O2 Saturation 97 % (92-99)    


 


Arterial Blood pH


 7.42


(7.35-7.45) 


 


 





 


Arterial Blood pCO2 at


Patient Temp 37 mmHg


(35-46) 


 


 





 


Arterial Blood pO2 at Patient


Temp 105 mmHg


() 


 


 





 


Arterial Blood HCO3


 24 mmol/L


(21-28) 


 


 





 


Arterial Blood Base Excess


 -1 mmol/L


(-3-3) 


 


 





 


FiO2 40    


 


Glucose (Fingerstick)


 


 169 mg/dL


(70-99) 


 











Microbiology


4/5/20 Blood Culture - Preliminary, Resulted


         NO GROWTH AFTER 1 DAY





Medication


Medications





Current Medications


Albumin Human 200 ml @  200 mls/hr 1X PRN  PRN IV Hypotension;  Start 4/6/20 at 

14:00;  Stop 4/6/20 at 19:59


Bupivacaine HCl/ Epinephrine Bitart (Sensorcain-Epi 0.5%-1:279488 Mpf) 30 ml 

STK-MED ONCE .ROUTE  Last administered on 4/6/20at 11:44;  Start 4/6/20 at 

11:00;  Stop 4/6/20 at 11:01;  Status DC


Cellulose (Surgicel Fibrillar 1x2) 1 each STK-MED ONCE .ROUTE ;  Start 4/6/20 at

11:00;  Stop 4/6/20 at 11:01;  Status DC


Cellulose (Surgicel Hemostat 4x8) 1 each STK-MED ONCE .ROUTE  Last administered 

on 4/6/20at 11:44;  Start 4/6/20 at 11:55;  Stop 4/6/20 at 11:56;  Status DC


Diphenhydramine HCl (Benadryl) 25 mg 1X PRN  PRN IV ITCHING;  Start 4/6/20 at 

14:00;  Stop 4/7/20 at 13:59


Diphenhydramine HCl (Benadryl) 25 mg 1X PRN  PRN IV ITCHING;  Start 4/6/20 at 

14:00;  Stop 4/7/20 at 13:59


Fentanyl Citrate (Fentanyl 2ml Vial) 25 mcg PRN Q5MIN  PRN IV MILD PAIN 1-3;  

Start 4/6/20 at 07:00;  Stop 4/7/20 at 06:59


Fentanyl Citrate (Fentanyl 2ml Vial) 50 mcg PRN Q5MIN  PRN IV MODERATE TO SEVERE

PAIN;  Start 4/6/20 at 07:00;  Stop 4/7/20 at 06:59


Info (PHARMACY MONITORING -- do not chart) 1 each PRN DAILY  PRN MC SEE 

COMMENTS;  Start 4/6/20 at 14:00


Lidocaine HCl (Xylocaine-Mpf 1% 2ml Vial) 2 ml PRN 1X  PRN ID PRIOR TO IV START;

 Start 4/6/20 at 07:00;  Stop 4/7/20 at 06:59


Ondansetron HCl (Zofran) 4 mg PRN Q6HRS  PRN IV NAUSEA/VOMITING;  Start 4/6/20 

at 07:00;  Stop 4/7/20 at 06:59


Prochlorperazine Edisylate (Compazine) 5 mg PACU PRN  PRN IV NAUSEA, MRX1;  

Start 4/6/20 at 07:00;  Stop 4/7/20 at 06:59


Propofol 20 ml @ As Directed STK-MED ONCE IV ;  Start 4/6/20 at 11:07;  Stop 

4/6/20 at 11:07;  Status DC


Ringer's Solution 1,000 ml @  30 mls/hr Q24H IV ;  Start 4/6/20 at 07:00;  Stop 

4/6/20 at 18:59


Sevoflurane (Ultane) 60 ml STK-MED ONCE IH ;  Start 4/6/20 at 12:46;  Stop 

4/6/20 at 12:46;  Status DC


Sodium Chloride 1,000 ml @  400 mls/hr Q2H30M PRN IV PATENCY;  Start 4/6/20 at 

13:51;  Stop 4/7/20 at 01:50


Sodium Chloride 1,000 ml @  1,000 mls/hr Q1H PRN IV hypotension;  Start 4/6/20 

at 13:51;  Stop 4/6/20 at 19:50


Sodium Chloride 90 meq/Potassium Chloride 15 meq/ Potassium Phosphate 10 mmol/ 

Magnesium Sulfate 8 meq/Calcium Gluconate 15 meq/ Multivitamins 10 ml/Chromium/ 

Copper/Manganese/ Seleni/Zn 0.5 ml/ Insulin Human Regular 25 unit/ Total 

Parenteral Nutrition/Amino Acids/Dextrose/ Fat Emulsion Intravenous 1,400 ml @  

58.333 mls/ hr TPN  CONT IV  Last administered on 4/5/20at 21:20;  Start 4/5/20 

at 22:00;  Stop 4/6/20 at 21:59


Sodium Chloride 90 meq/Potassium Chloride 15 meq/ Potassium Phosphate 10 mmol/ 

Magnesium Sulfate 12 meq/Calcium Gluconate 15 meq/ Multivitamins 10 ml/Chromium/

Copper/Manganese/ Seleni/Zn 0.5 ml/ Insulin Human Regular 25 unit/ Total 

Parenteral Nutrition/Amino Acids/Dextrose/ Fat Emulsion Intravenous 1,400 ml @  

58.333 mls/ hr TPN  CONT IV ;  Start 4/6/20 at 22:00;  Stop 4/7/20 at 21:59





Comment


Review of Relevant


I have reviewed the following items josy (where applicable) has been applied.











DORYS LANDA MD                  Apr 6, 2020 14:41

## 2020-04-06 NOTE — NUR
pt returned to [facility] after dialysis. 4.7 kg removed off of pt. pt tolerated well with 
levophed infusing. levophed to be titrated post dialysis.

## 2020-04-06 NOTE — PDOC
PROGRESS NOTES


Chief Complaint


Chief Complaint


Respiratory failure requiring mechanical ventilation


Severe Acute gallstone pancreatitis (not a surgical candidate at this time) with

necrosis


Acute kidney failure now requiring dialysis


Salpingitis


Gallstones (Calculus of gallbladder with acute cholecystitis without 

obstruction)


HTN 


Leukocytosis 


Hypoxia


Uterine fibroid


Hypoxia with respiratory failure


Intractable pain


Intractable nausea


Covid 19 negative. 


Acute on chronic anemia will have to follow up since there is suspicion for 

hemorrhagic fluid in pelvis





Plan:


continue supportive measures


grim prognosis


discussed with surgical consultant


planning for trach today





History of Present Illness


History of Present Illness


4/6/2020


Plans for trach int he am and dialysis in the afternoon, no acute events 

reported overnight. 





4/5/2020


No acute events reported overnight, case discussed with nursing staff patient in

no acute distress during my visit





4/4/2020


No acute events reported overnight, patient receiving dialysis today, case 

discussed with nursing staff patient in no acute distress during my visit








4/3/2020


No new changes remains critically ill, off CRRT, still planning for trach on 

Monday unless we manage to wean over the weekend





4/2/2020


Continues to remain critically ill.  The patient unable to be taken off 

ventilatory support as of yet.,  Discussed with pulmonary consultant.  Planning 

all tracheostomy on Monday if he is unable to be weaned off vent





4/1/2020


No acute events reported overnight, no fever or chills, remains critically 

stable, discussed with mother at bedside. 





[id number]


Patient seen and examined in the ICU


She is critically ill


Mechanically ventilated


Assist-control/25/450/30 with 8 of PEEP


She is on cefepime daptomycin Flagyl and micafungin GEN for antibiotic coverage


Also getting TPN and CRRT


Sedated with Versed


Getting IV albumin also


She is tachycardic at 112 bpm


Temp max 100.0


White blood count is down from 45,000 35,000 today


Chart reviewed


Discussed with RN











[id number]


Patient seen and examined in the ICU


She remains very critically ill


Going for a CT of the abdomen chest and pelvis


Ventilated : On assist control 40% FiO2


Discussed with RN


Chart reviewed








[id number]


Patient seen and examined in the ICU


She is still on CRRT although we plan to change to hemodialysis soon


Chart reviewed


Discussed with RN


Hemoglobin down to 6.9 (suspect CRRT related , Will let nephrology consider 

transfusion while on dialysis)








[id number]


Patient seen and examined in the ICU


She is mechanically ventilated


Before meals/25/450/30%


Also on CRRT


Very critically ill


Chart reviewed


Discussed with RN











[id number]


Patient seen and examined in the ICU


She is now been transferred to the Covid 19 unit so we can rule out Covid and 

keep her in isolation


Very critically ill


Intubated and  ventilated


Assist control 40%


Chart reviewed


Discussed with RN


Still on CRRT


Getting a chest x-ray now


Very concerned about her prognosis








[id number]


Patient seen and examined in the ICU


She is extremely critically ill


Remains mechanically ventilated with assist control/25/450/40%


Also on continuous renal replacement therapy


Chart reviewed


Discussed with RN


She has TPN hanging


Also on pressors











[id number]


Patient seen and examined in the ICU


She is still requiring CRRT


On the vent with assist control but her FiO2 is down to 40% from yesterday


Slightly better but still very critically ill


Discussed with RN


Chart reviewed








[id number]


Patient seen and examined in the ICU


She remains extremely critically ill


On IV fentanyl IV Versed IV Doxy


On the vent


Assist-control/25/450/70%


She is critically ill


Discussed with RN


Chart reviewed


Patient is currently on CRRT as well








[id number]


Patient seen and examined in the ICU


She had to be intubated this morning


On assist-control 25/450/100% with 10 of PEEP and only satting 87%


She is extremely critically ill


I'm not sure if she will survive


Chart reviewed


Discussed with RN











Ms Tadeo is a 50yo F w/ PMHx HTN, prediabetes who presents the emergency room

complaints of abdominal pain. Patient described off and on 3 days. She states is

constant, described as a squeezing sensation in a band-like distribution. + 

nausea, vomiting.  She denies any fever or diarrhea.  Patient denies any 

abdominal surgical procedures.  She states is worse with movements, car ride.  

Pain initially was upper abdomen however now pretty much generalized.  Last 

bowel movement was 3/15/2020. Nothing makes her pain better.  Patient denies any

shortness of breath.  She does state the pain moves into her chest.  Denies any 

headache or visual changes.


Lipase [id number], , , Bilirubin 1.4.


CT abdomen confirms pancreatic inflammation, peripancreatic fluid and 

inflammatory changes around the pancreas consistent with pancreatitis. 

Cholelithiasis and 1.4cm uterine fibroid as well as possible left salpingitis. 

Admitted for further care


GI, General surgery, ID, Pulm consulted.





3/17: Overnight per report no urine output. Added dilaudid for pain, PICC placed

per IR. Renal US negative.Seen bedside in ICU, given 2L additional NSS and 

albumin infusion. Still hypotensive, started on levophed. Repeat CT abdomen with

necrosis. Updated her fiancee


3/18: Sats are only 87% on nasal cannula oxygen. Dialysis catheter per nep

hrology


3/19: She is now on BiPAP appears more ill, now on dialysis


3/20: Seen on BiPAP. Her mother and another family member are present and seemed

to be good support for her. Currently on dialysis. Appears critically ill


3/21: Overnight Tmax 101.7 , still on BiPAP FiO2 40%, still on low dose Levophed

gtt, TPN initiated. On dialysis





Overnight still febrile. Dialysis today. She wakes up and responds to pain. No 

CP.





Vitals


Vitals





Vital Signs








  Date Time  Temp Pulse Resp B/P (MAP) Pulse Ox O2 Delivery O2 Flow Rate FiO2


 


4/6/20 07:48      Mechanical Ventilator  


 


4/6/20 07:39     100   


 


4/6/20 07:00 97.7 77 18 104/65 (78)    





 97.7       











Physical Exam


Physical Exam


GENERAL: Orally intubated/sedated - generalized anasarca 


HEENT: Pupils equal, ETT, OGT 


NECK:  Supple 


LUNGS: Diminished aeration bases 


HEART:  S1, S2, regular 


ABDOMEN:  Distended, hypoactive BS, Rectal tube in place 


: [place]   


EXTREMITIES: Generalized edema, no cyanosis - some mottling, [place] boots & SCDs 

bilaterally 


DERMATOLOGIC:  Warm and dry.  No generalized rash.  


CENTRAL NERVOUS SYSTEM: Sedated 


RIJ Temp HDC, RUE-PICC & LIJ


General:  Other (sedated )


Heart:  Other (increased rate)


Lungs:  Other (dimished in BLL)


Abdomen:  Soft, Other (distended )


Extremities:  No edema, Other (SOME CLUBBING )


Skin:  Other (mottling noted to extremities )





Labs


LABS





Laboratory Tests








Test


 4/5/20


11:44 4/5/20


18:09 4/5/20


23:55 4/6/20


05:26


 


Glucose (Fingerstick)


 162 mg/dL


(70-99) 133 mg/dL


(70-99) 158 mg/dL


(70-99) 157 mg/dL


(70-99)


 


Test


 4/6/20


05:45 


 


 





 


Hemoglobin


 7.8 g/dL


(12.0-15.5) 


 


 





 


Sodium Level


 139 mmol/L


(136-145) 


 


 





 


Potassium Level


 4.2 mmol/L


(3.5-5.1) 


 


 





 


Chloride Level


 103 mmol/L


() 


 


 





 


Carbon Dioxide Level


 25 mmol/L


(21-32) 


 


 





 


Anion Gap 11 (6-14)    


 


Blood Urea Nitrogen


 48 mg/dL


(7-20) 


 


 





 


Creatinine


 1.8 mg/dL


(0.6-1.0) 


 


 





 


Estimated GFR


(Cockcroft-Gault) 29.9 


 


 


 





 


Glucose Level


 167 mg/dL


(70-99) 


 


 





 


Calcium Level


 8.6 mg/dL


(8.5-10.1) 


 


 





 


Phosphorus Level


 3.9 mg/dL


(2.6-4.7) 


 


 





 


Magnesium Level


 1.8 mg/dL


(1.8-2.4) 


 


 





 


Albumin


 2.6 g/dL


(3.4-5.0) 


 


 














Assessment and Plan


Assessmemt and Plan


Problems


Medical Problems:


(1) Acute pancreatitis


Status: Acute  





(2) Cholelithiasis


Status: Acute  











Comment


Review of Relevant


I have reviewed the following items josy (where applicable) has been applied.


Labs





Laboratory Tests








Test


 4/4/20


14:54 4/4/20


17:50 4/4/20


18:38 4/4/20


23:52


 


Urine Collection Type Unknown    


 


Urine Color Red    


 


Urine Clarity Cloudy    


 


Urine pH 5.0 (<5.0-8.0)    


 


Urine Specific Gravity


 1.025


(1.000-1.030) 


 


 





 


Urine Protein


 100 mg/dL


(NEG-TRACE) 


 


 





 


Urine Glucose (UA)


 Negative mg/dL


(NEG) 


 


 





 


Urine Ketones (Stick)


 Trace mg/dL


(NEG) 


 


 





 


Urine Blood Large (NEG)    


 


Urine Nitrite Positive (NEG)    


 


Urine Bilirubin Small (NEG)    


 


Urine Urobilinogen Dipstick


 0.2 mg/dL (0.2


mg/dL) 


 


 





 


Urine Leukocyte Esterase Small (NEG)    


 


Urine RBC


 Rare /HPF


(0-2) 


 


 





 


Urine WBC 1-4 /HPF (0-4)    


 


Urine Squamous Epithelial


Cells Occ /LPF 


 


 


 





 


Urine Amorphous Sediment Present /HPF    


 


Urine Bacteria 0 /HPF (0-FEW)    


 


O2 Saturation  98 % (92-99)   


 


Arterial Blood pH


 


 7.48


(7.35-7.45) 


 





 


Arterial Blood pCO2 at


Patient Temp 


 30 mmHg


(35-46) 


 





 


Arterial Blood pO2 at Patient


Temp 


 122 mmHg


() 


 





 


Arterial Blood HCO3


 


 22 mmol/L


(21-28) 


 





 


Arterial Blood Base Excess


 


 -1 mmol/L


(-3-3) 


 





 


FiO2  50   


 


Glucose (Fingerstick)


 


 


 171 mg/dL


(70-99) 173 mg/dL


(70-99)


 


Test


 4/5/20


06:00 4/5/20


06:17 4/5/20


08:00 4/5/20


11:44


 


White Blood Count


 9.6 x10^3/uL


(4.0-11.0) 


 


 





 


Red Blood Count


 2.42 x10^6/uL


(3.50-5.40) 


 


 





 


Hemoglobin


 7.8 g/dL


(12.0-15.5) 


 


 





 


Hematocrit


 23.8 %


(36.0-47.0) 


 


 





 


Mean Corpuscular Volume 98 fL ()    


 


Mean Corpuscular Hemoglobin 32 pg (25-35)    


 


Mean Corpuscular Hemoglobin


Concent 33 g/dL


(31-37) 


 


 





 


Red Cell Distribution Width


 19.5 %


(11.5-14.5) 


 


 





 


Platelet Count


 198 x10^3/uL


(140-400) 


 


 





 


Neutrophils (%) (Auto) 76 % (31-73)    


 


Lymphocytes (%) (Auto) 10 % (24-48)    


 


Monocytes (%) (Auto) 7 % (0-9)    


 


Eosinophils (%) (Auto) 7 % (0-3)    


 


Basophils (%) (Auto) 1 % (0-3)    


 


Neutrophils # (Auto)


 7.3 x10^3/uL


(1.8-7.7) 


 


 





 


Lymphocytes # (Auto)


 0.9 x10^3/uL


(1.0-4.8) 


 


 





 


Monocytes # (Auto)


 0.7 x10^3/uL


(0.0-1.1) 


 


 





 


Eosinophils # (Auto)


 0.6 x10^3/uL


(0.0-0.7) 


 


 





 


Basophils # (Auto)


 0.0 x10^3/uL


(0.0-0.2) 


 


 





 


Sodium Level


 139 mmol/L


(136-145) 


 


 





 


Potassium Level


 4.0 mmol/L


(3.5-5.1) 


 


 





 


Chloride Level


 104 mmol/L


() 


 


 





 


Carbon Dioxide Level


 26 mmol/L


(21-32) 


 


 





 


Anion Gap 9 (6-14)    


 


Blood Urea Nitrogen


 54 mg/dL


(7-20) 


 


 





 


Creatinine


 1.9 mg/dL


(0.6-1.0) 


 


 





 


Estimated GFR


(Cockcroft-Gault) 28.1 


 


 


 





 


BUN/Creatinine Ratio 28 (6-20)    


 


Glucose Level


 164 mg/dL


(70-99) 


 


 





 


Calcium Level


 8.3 mg/dL


(8.5-10.1) 


 


 





 


Total Bilirubin


 1.0 mg/dL


(0.2-1.0) 


 


 





 


Aspartate Amino Transf


(AST/SGOT) 47 U/L (15-37) 


 


 


 





 


Alanine Aminotransferase


(ALT/SGPT) 20 U/L (14-59) 


 


 


 





 


Alkaline Phosphatase


 81 U/L


() 


 


 





 


Total Protein


 5.0 g/dL


(6.4-8.2) 


 


 





 


Albumin


 2.3 g/dL


(3.4-5.0) 


 


 





 


Albumin/Globulin Ratio 0.9 (1.0-1.7)    


 


Glucose (Fingerstick)


 


 167 mg/dL


(70-99) 


 162 mg/dL


(70-99)


 


O2 Saturation   97 % (92-99)  


 


Arterial Blood pH


 


 


 7.42


(7.35-7.45) 





 


Arterial Blood pCO2 at


Patient Temp 


 


 35 mmHg


(35-46) 





 


Arterial Blood pO2 at Patient


Temp 


 


 104 mmHg


() 





 


Arterial Blood HCO3


 


 


 22 mmol/L


(21-28) 





 


Arterial Blood Base Excess


 


 


 -2 mmol/L


(-3-3) 





 


FiO2   45  


 


Test


 4/5/20


18:09 4/5/20


23:55 4/6/20


05:26 4/6/20


05:45


 


Glucose (Fingerstick)


 133 mg/dL


(70-99) 158 mg/dL


(70-99) 157 mg/dL


(70-99) 





 


Hemoglobin


 


 


 


 7.8 g/dL


(12.0-15.5)


 


Sodium Level


 


 


 


 139 mmol/L


(136-145)


 


Potassium Level


 


 


 


 4.2 mmol/L


(3.5-5.1)


 


Chloride Level


 


 


 


 103 mmol/L


()


 


Carbon Dioxide Level


 


 


 


 25 mmol/L


(21-32)


 


Anion Gap    11 (6-14) 


 


Blood Urea Nitrogen


 


 


 


 48 mg/dL


(7-20)


 


Creatinine


 


 


 


 1.8 mg/dL


(0.6-1.0)


 


Estimated GFR


(Cockcroft-Gault) 


 


 


 29.9 





 


Glucose Level


 


 


 


 167 mg/dL


(70-99)


 


Calcium Level


 


 


 


 8.6 mg/dL


(8.5-10.1)


 


Phosphorus Level


 


 


 


 3.9 mg/dL


(2.6-4.7)


 


Magnesium Level


 


 


 


 1.8 mg/dL


(1.8-2.4)


 


Albumin


 


 


 


 2.6 g/dL


(3.4-5.0)








Laboratory Tests








Test


 4/5/20


11:44 4/5/20


18:09 4/5/20


23:55 4/6/20


05:26


 


Glucose (Fingerstick)


 162 mg/dL


(70-99) 133 mg/dL


(70-99) 158 mg/dL


(70-99) 157 mg/dL


(70-99)


 


Test


 4/6/20


05:45 


 


 





 


Hemoglobin


 7.8 g/dL


(12.0-15.5) 


 


 





 


Sodium Level


 139 mmol/L


(136-145) 


 


 





 


Potassium Level


 4.2 mmol/L


(3.5-5.1) 


 


 





 


Chloride Level


 103 mmol/L


() 


 


 





 


Carbon Dioxide Level


 25 mmol/L


(21-32) 


 


 





 


Anion Gap 11 (6-14)    


 


Blood Urea Nitrogen


 48 mg/dL


(7-20) 


 


 





 


Creatinine


 1.8 mg/dL


(0.6-1.0) 


 


 





 


Estimated GFR


(Cockcroft-Gault) 29.9 


 


 


 





 


Glucose Level


 167 mg/dL


(70-99) 


 


 





 


Calcium Level


 8.6 mg/dL


(8.5-10.1) 


 


 





 


Phosphorus Level


 3.9 mg/dL


(2.6-4.7) 


 


 





 


Magnesium Level


 1.8 mg/dL


(1.8-2.4) 


 


 





 


Albumin


 2.6 g/dL


(3.4-5.0) 


 


 











Microbiology


4/4/20 Blood Culture - Preliminary, Resulted


         NO GROWTH AFTER 1 DAY


Medications





Current Medications


Sodium Chloride 1,000 ml @  1,000 mls/hr Q1H IV  Last administered on 3/16/20at 

03:00;  Start 3/16/20 at 03:00;  Stop 3/16/20 at 03:59;  Status DC


Ondansetron HCl (Zofran) 4 mg 1X  ONCE IVP  Last administered on 3/16/20at 

03:27;  Start 3/16/20 at 03:00;  Stop 3/16/20 at 03:01;  Status DC


Morphine Sulfate (Morphine Sulfate) 4 mg 1X  ONCE IV ;  Start 3/16/20 at 03:00; 

Stop 3/16/20 at 03:01;  Status Cancel


Ketorolac Tromethamine (Toradol 30mg Vial) 30 mg 1X  ONCE IV  Last administered 

on 3/16/20at 02:54;  Start 3/16/20 at 03:00;  Stop 3/16/20 at 03:01;  Status DC


Fentanyl Citrate (Fentanyl 2ml Vial) 25 mcg 1X  ONCE IVP  Last administered on 

3/16/20at 03:23;  Start 3/16/20 at 03:30;  Stop 3/16/20 at 03:31;  Status DC


Fentanyl Citrate (Fentanyl 2ml Vial) 100 mcg STK-MED ONCE .ROUTE ;  Start 

3/16/20 at 03:18;  Stop 3/16/20 at 03:18;  Status DC


Iohexol (Omnipaque 350 Mg/ml) 90 ml 1X  ONCE IV  Last administered on 3/16/20at 

03:25;  Start 3/16/20 at 03:30;  Stop 3/16/20 at 03:31;  Status DC


Info (CONTRAST GIVEN -- Rx MONITORING) 1 each PRN DAILY  PRN MC SEE COMMENTS;  

Start 3/16/20 at 03:30;  Stop 3/18/20 at 03:29;  Status DC


Hydromorphone HCl (Dilaudid) 0.5 mg 1X  ONCE IV  Last administered on 3/16/20at 

03:55;  Start 3/16/20 at 04:30;  Stop 3/16/20 at 04:32;  Status DC


Ondansetron HCl (Zofran) 4 mg PRN Q8HRS  PRN IV NAUSEA/VOMITING 1ST CHOICE;  

Start 3/16/20 at 05:00;  Stop 3/16/20 at 09:27;  Status DC


Morphine Sulfate (Morphine Sulfate) 2 mg PRN Q2HR  PRN IV SEVERE PAIN 7-10 Last 

administered on 3/17/20at 12:26;  Start 3/16/20 at 05:00;  Stop 3/17/20 at 

14:15;  Status DC


Sodium Chloride 1,000 ml @  125 mls/hr Q8H IV  Last administered on 3/16/20at 

20:56;  Start 3/16/20 at 05:00;  Stop 3/17/20 at 04:59;  Status DC


Hydromorphone HCl (Dilaudid) 0.5 mg PRN Q3HRS  PRN IV SEVERE PAIN 7-10 Last 

administered on 3/17/20at 10:06;  Start 3/16/20 at 05:00;  Stop 3/17/20 at 12:0

1;  Status DC


Piperacillin Sod/ Tazobactam Sod 4.5 gm/Sodium Chloride 100 ml @  200 mls/hr 1X 

ONCE IV  Last administered on 3/16/20at 05:44;  Start 3/16/20 at 06:00;  Stop 

3/16/20 at 06:29;  Status DC


Ondansetron HCl (Zofran) 4 mg PRN Q4HRS  PRN IV NAUSEA/VOMITING 1ST CHOICE Last 

administered on 3/21/20at 16:15;  Start 3/16/20 at 09:30


Insulin Human Lispro (HumaLOG) 0-9 UNITS Q6HRS SQ  Last administered on 4/6/20at

06:18;  Start 3/16/20 at 09:30


Dextrose (Dextrose 50%-Water Syringe) 12.5 gm PRN Q15MIN  PRN IV SEE COMMENTS;  

Start 3/16/20 at 09:30


Pantoprazole Sodium (PROTONIX VIAL for IV PUSH) 40 mg DAILYAC IVP  Last 

administered on 4/5/20at 07:32;  Start 3/16/20 at 11:30


Prochlorperazine Edisylate (Compazine) 10 mg PRN Q6HRS  PRN IV NAUSEA/VOMITING, 

2nd CHOICE Last administered on 3/17/20at 00:42;  Start 3/16/20 at 17:45


Atenolol (Tenormin) 100 mg DAILY PO ;  Start 3/17/20 at 09:00;  Stop 3/16/20 at 

20:08;  Status DC


Metoprolol Tartrate (Lopressor Vial) 2.5 mg Q6HRS IVP  Last administered on 

3/17/20at 05:51;  Start 3/16/20 at 20:15;  Stop 3/17/20 at 10:02;  Status DC


Metoprolol Tartrate (Lopressor Vial) 5 mg Q6HRS IVP  Last administered on 

3/26/20at 00:12;  Start 3/17/20 at 10:15;  Stop 3/28/20 at 08:48;  Status DC


Hydromorphone HCl (Dilaudid) 1 mg PRN Q3HRS  PRN IV SEVERE PAIN 7-10 Last 

administered on 3/23/20at 05:13;  Start 3/17/20 at 12:00;  Stop 3/31/20 at 

00:25;  Status DC


Lidocaine HCl (Buffered Lidocaine 1%) 3 ml STK-MED ONCE .ROUTE ;  Start 3/17/20 

at 12:55;  Stop 3/17/20 at 12:56;  Status DC


Albumin Human 500 ml @  125 mls/hr 1X  ONCE IV  Last administered on 3/17/20at 

14:33;  Start 3/17/20 at 14:30;  Stop 3/17/20 at 18:32;  Status DC


Norepinephrine Bitartrate 8 mg/ Dextrose 258 ml @  17.299 mls/ hr CONT  PRN IV 

PER PROTOCOL Last administered on 4/5/20at 21:18;  Start 3/17/20 at 15:30


Sodium Chloride 1,000 ml @  125 mls/hr Q8H IV  Last administered on 3/17/20at 

21:04;  Start 3/17/20 at 16:00;  Stop 3/18/20 at 02:42;  Status DC


Albumin Human 500 ml @  125 mls/hr PRN BID  PRN IV After every 2L NSS & BP < 

90mm Last administered on 4/1/20at 14:21;  Start 3/17/20 at 16:00


Iohexol (Omnipaque 300 Mg/ml) 60 ml 1X  ONCE IV  Last administered on 3/17/20at 

17:20;  Start 3/17/20 at 17:00;  Stop 3/17/20 at 17:01;  Status DC


Info (CONTRAST GIVEN -- Rx MONITORING) 1 each PRN DAILY  PRN MC SEE COMMENTS;  

Start 3/17/20 at 17:00;  Stop 3/19/20 at 16:59;  Status DC


Meropenem 1 gm/ Sodium Chloride 100 ml @  200 mls/hr Q8HRS IV  Last administered

on 3/18/20at 05:45;  Start 3/17/20 at 20:00;  Stop 3/18/20 at 08:48;  Status DC


Furosemide (Lasix) 40 mg 1X  ONCE IVP  Last administered on 3/17/20at 22:12;  

Start 3/17/20 at 22:30;  Stop 3/17/20 at 22:31;  Status DC


Calcium Chloride 1000 mg/Sodium Chloride 110 ml @  220 mls/hr 1X  ONCE IV  Last 

administered on 3/17/20at 22:11;  Start 3/17/20 at 22:30;  Stop 3/17/20 at 

22:59;  Status DC


Albuterol Sulfate (Ventolin Neb Soln) 2.5 mg 1X  ONCE NEB  Last administered on 

3/18/20at 00:56;  Start 3/17/20 at 22:30;  Stop 3/17/20 at 22:31;  Status DC


Insulin Human Regular (HumuLIN R VIAL) 5 unit 1X  ONCE IV  Last administered on 

3/17/20at 22:14;  Start 3/17/20 at 22:30;  Stop 3/17/20 at 22:31;  Status DC


Magnesium Sulfate 50 ml @ 25 mls/hr 1X  ONCE IV  Last administered on 3/18/20at 

02:57;  Start 3/18/20 at 03:00;  Stop 3/18/20 at 04:59;  Status DC


Calcium Gluconate 1000 mg/Sodium Chloride 110 ml @  220 mls/hr 1X  ONCE IV  Last

administered on 3/18/20at 02:46;  Start 3/18/20 at 03:00;  Stop 3/18/20 at 

03:29;  Status DC


Sodium Chloride 1,000 ml @  200 mls/hr Q5H IV  Last administered on 3/18/20at 

02:46;  Start 3/18/20 at 03:00;  Stop 3/18/20 at 10:21;  Status DC


Calcium Gluconate 1000 mg/Sodium Chloride 110 ml @  220 mls/hr 1X  ONCE IV  Last

administered on 3/18/20at 03:21;  Start 3/18/20 at 03:30;  Stop 3/18/20 at 

03:59;  Status DC


Sodium Bicarbonate 50 meq/Sodium Chloride 1,050 ml @  75 mls/hr Q14H IV  Last 

administered on 3/22/20at 21:10;  Start 3/18/20 at 07:30;  Stop 3/23/20 at 

10:28;  Status DC


Calcium Gluconate 2000 mg/Sodium Chloride 120 ml @  220 mls/hr 1X  ONCE IV  Last

administered on 3/18/20at 09:05;  Start 3/18/20 at 07:30;  Stop 3/18/20 at 

08:02;  Status DC


Lidocaine HCl (Xylocaine-Mpf 1% 2ml Vial) 2 ml STK-MED ONCE .ROUTE ;  Start 

3/18/20 at 08:47;  Stop 3/18/20 at 08:47;  Status DC


Meropenem 500 mg/ Sodium Chloride 50 ml @  100 mls/hr Q12HR IV  Last 

administered on 3/23/20at 21:01;  Start 3/18/20 at 18:00;  Stop 3/24/20 at 

07:58;  Status DC


Lidocaine HCl (Buffered Lidocaine 1%) 3 ml STK-MED ONCE .ROUTE ;  Start 3/18/20 

at 09:46;  Stop 3/18/20 at 09:46;  Status DC


Lidocaine HCl (Buffered Lidocaine 1%) 6 ml 1X  ONCE INJ  Last administered on 

3/18/20at 10:26;  Start 3/18/20 at 10:15;  Stop 3/18/20 at 10:16;  Status DC


Info (Tpn Per Pharmacy) 1 each PRN DAILY  PRN MC SEE COMMENTS Last administered 

on 4/5/20at 10:46;  Start 3/18/20 at 12:00


Sodium Chloride 1,000 ml @  1,000 mls/hr Q1H PRN IV hypotension;  Start 3/18/20 

at 12:07;  Stop 3/18/20 at 18:06;  Status DC


Diphenhydramine HCl (Benadryl) 25 mg 1X PRN  PRN IV ITCHING;  Start 3/18/20 at 

12:15;  Stop 3/19/20 at 12:14;  Status DC


Diphenhydramine HCl (Benadryl) 25 mg 1X PRN  PRN IV ITCHING;  Start 3/18/20 at 

12:15;  Stop 3/19/20 at 12:14;  Status DC


Sodium Chloride 1,000 ml @  400 mls/hr Q2H30M PRN IV PATENCY;  Start 3/18/20 at 

12:07;  Stop 3/19/20 at 00:06;  Status DC


Info (PHARMACY MONITORING -- do not chart) 1 each PRN DAILY  PRN MC SEE 

COMMENTS;  Start 3/18/20 at 12:15;  Stop 3/20/20 at 08:13;  Status DC


Sodium Chloride 90 meq/Calcium Gluconate 10 meq/ Multivitamins 10 ml/Chromium/ 

Copper/Manganese/ Seleni/Zn 1 ml/ Total Parenteral Nutrition/Amino 

Acids/Dextrose/ Fat Emulsion Intravenous 55.005 ml  @ 2.292 mls/hr TPN  CONT IV 

;  Start 3/18/20 at 22:00;  Stop 3/18/20 at 12:33;  Status DC


Info (Tpn Per Pharmacy) 1 each PRN DAILY  PRN MC SEE COMMENTS;  Start 3/18/20 at

12:30;  Status UNV


Sodium Chloride 90 meq/Calcium Gluconate 10 meq/ Multivitamins 10 ml/Chromium/ 

Copper/Manganese/ Seleni/Zn 0.5 ml/ Total Parenteral Nutrition/Amino 

Acids/Dextrose/ Fat Emulsion Intravenous 1,512 ml @  63 mls/hr TPN  CONT IV  

Last administered on 3/18/20at 22:06;  Start 3/18/20 at 22:00;  Stop 3/19/20 at 

21:59;  Status DC


Calcium Carbonate/ Glycine (Tums) 500 mg PRN AFTMEALHC  PRN PO INDIGESTION;  

Start 3/18/20 at 17:45


Calcium Gluconate (Calcium Gluconate) 2,000 mg 1X  ONCE IVP  Last administered 

on 3/19/20at 02:19;  Start 3/19/20 at 02:15;  Stop 3/19/20 at 02:16;  Status DC


Calcium Chloride 3000 mg/Sodium Chloride 1,030 ml @  50 mls/hr [id number] IV  Last 

administered on 3/21/20at 02:17;  Start 3/19/20 at 08:00;  Stop 3/21/20 at 

15:23;  Status DC


Lorazepam (Ativan Inj) 1 mg PRN Q4HRS  PRN IVP ANXIETY / AGITATION Last 

administered on 3/23/20at 00:34;  Start 3/19/20 at 09:00


Sodium Chloride 1,000 ml @  1,000 mls/hr Q1H PRN IV hypotension;  Start 3/19/20 

at 08:56;  Stop 3/19/20 at 14:55;  Status DC


Albumin Human 200 ml @  200 mls/hr 1X PRN  PRN IV Hypotension;  Start 3/19/20 at

09:00;  Stop 3/19/20 at 14:59;  Status DC


Diphenhydramine HCl (Benadryl) 25 mg 1X PRN  PRN IV ITCHING;  Start 3/19/20 at 

09:00;  Stop 3/20/20 at 08:59;  Status DC


Diphenhydramine HCl (Benadryl) 25 mg 1X PRN  PRN IV ITCHING;  Start 3/19/20 at 

09:00;  Stop 3/20/20 at 08:59;  Status DC


Sodium Chloride 1,000 ml @  400 mls/hr Q2H30M PRN IV PATENCY;  Start 3/19/20 at 

08:56;  Stop 3/19/20 at 20:55;  Status DC


Info (PHARMACY MONITORING -- do not chart) 1 each PRN DAILY  PRN MC SEE 

COMMENTS;  Start 3/19/20 at 09:00;  Status UNV


Info (PHARMACY MONITORING -- do not chart) 1 each PRN DAILY  PRN MC SEE 

COMMENTS;  Start 3/19/20 at 09:00;  Stop 3/20/20 at 08:13;  Status DC


Digoxin (Lanoxin) 500 mcg 1X  ONCE IV  Last administered on 3/19/20at 10:04;  

Start 3/19/20 at 10:00;  Stop 3/19/20 at 10:01;  Status DC


Digoxin (Lanoxin) 125 mcg 1X  ONCE IV  Last administered on 3/19/20at 17:10;  

Start 3/19/20 at 18:00;  Stop 3/19/20 at 18:01;  Status DC


Magnesium Sulfate 100 ml @  25 mls/hr 1X  ONCE IV  Last administered on 

3/19/20at 12:48;  Start 3/19/20 at 13:00;  Stop 3/19/20 at 16:59;  Status DC


Sodium Chloride 90 meq/Magnesium Sulfate 10 meq/ Calcium Gluconate 20 meq/ 

Multivitamins 10 ml/Chromium/ Copper/Manganese/ Seleni/Zn 0.5 ml/ Total 

Parenteral Nutrition/Amino Acids/Dextrose/ Fat Emulsion Intravenous 1,512 ml @  

63 mls/hr TPN  CONT IV  Last administered on 3/19/20at 22:25;  Start 3/19/20 at 

22:00;  Stop 3/20/20 at 21:59;  Status DC


Sodium Chloride 1,000 ml @  1,000 mls/hr Q1H PRN IV hypotension;  Start 3/20/20 

at 08:05;  Stop 3/20/20 at 14:04;  Status DC


Albumin Human 200 ml @  200 mls/hr 1X  ONCE IV  Last administered on 3/20/20at 

08:57;  Start 3/20/20 at 08:15;  Stop 3/20/20 at 09:14;  Status DC


Diphenhydramine HCl (Benadryl) 25 mg 1X PRN  PRN IV ITCHING;  Start 3/20/20 at 

08:15;  Stop 3/21/20 at 08:14;  Status DC


Diphenhydramine HCl (Benadryl) 25 mg 1X PRN  PRN IV ITCHING;  Start 3/20/20 at 

08:15;  Stop 3/21/20 at 08:14;  Status DC


Sodium Chloride 1,000 ml @  400 mls/hr Q2H30M PRN IV PATENCY;  Start 3/20/20 at 

08:05;  Stop 3/20/20 at 20:04;  Status DC


Info (PHARMACY MONITORING -- do not chart) 1 each PRN DAILY  PRN MC SEE 

COMMENTS;  Start 3/20/20 at 08:15;  Stop 3/24/20 at 07:57;  Status DC


Sodium Chloride 90 meq/Potassium Chloride 15 meq/ Potassium Phosphate 10 mmol/ 

Magnesium Sulfate 10 meq/Calcium Gluconate 20 meq/ Multivitamins 10 ml/Chromium/

Copper/Manganese/ Seleni/Zn 0.5 ml/ Total Parenteral Nutrition/Amino 

Acids/Dextrose/ Fat Emulsion Intravenous 1,512 ml @  63 mls/hr TPN  CONT IV  

Last administered on 3/20/20at 21:01;  Start 3/20/20 at 22:00;  Stop 3/21/20 at 

21:59;  Status DC


Potassium Chloride/Water 100 ml @  100 mls/hr 1X  ONCE IV  Last administered on 

3/20/20at 14:09;  Start 3/20/20 at 14:00;  Stop 3/20/20 at 14:59;  Status DC


Benzocaine (Hurricaine One) 1 spray 1X  ONCE MM  Last administered on 3/20/20at 

16:38;  Start 3/20/20 at 14:30;  Stop 3/20/20 at 14:31;  Status DC


Lidocaine HCl (Glydo (Lidocaine) Jelly) 1 thomas 1X  ONCE MM  Last administered on 

3/20/20at 16:38;  Start 3/20/20 at 14:30;  Stop 3/20/20 at 14:31;  Status DC


Linezolid/Dextrose 300 ml @  300 mls/hr Q12HR IV  Last administered on 3/26/20at

21:04;  Start 3/20/20 at 20:00;  Stop 3/27/20 at 07:50;  Status DC


Acetaminophen (Tylenol) 650 mg PRN Q6HRS  PRN PO MILD PAIN / TEMP;  Start 

3/21/20 at 03:30;  Stop 3/21/20 at 03:36;  Status DC


Acetaminophen (Tylenol) 650 mg PRN Q6HRS  PRN PEG MILD PAIN / TEMP Last 

administered on 3/26/20at 07:35;  Start 3/21/20 at 03:36


Sodium Chloride 1,000 ml @  1,000 mls/hr Q1H PRN IV hypotension;  Start 3/21/20 

at 07:50;  Stop 3/21/20 at 13:49;  Status DC


Albumin Human 200 ml @  200 mls/hr 1X PRN  PRN IV Hypotension;  Start 3/21/20 at

08:00;  Stop 3/21/20 at 13:59;  Status DC


Sodium Chloride (Normal Saline Flush) 10 ml 1X PRN  PRN IV AP catheter pack;  

Start 3/21/20 at 08:00;  Stop 3/22/20 at 07:59;  Status DC


Sodium Chloride (Normal Saline Flush) 10 ml 1X PRN  PRN IV  catheter pack;  

Start 3/21/20 at 08:00;  Stop 3/22/20 at 07:59;  Status DC


Sodium Chloride 1,000 ml @  400 mls/hr Q2H30M PRN IV PATENCY;  Start 3/21/20 at 

07:50;  Stop 3/21/20 at 19:49;  Status DC


Info (PHARMACY MONITORING -- do not chart) 1 each PRN DAILY  PRN MC SEE 

COMMENTS;  Start 3/21/20 at 08:00;  Status UNV


Info (PHARMACY MONITORING -- do not chart) 1 each PRN DAILY  PRN MC SEE 

COMMENTS;  Start 3/21/20 at 08:00;  Stop 3/23/20 at 08:25;  Status DC


Sodium Chloride 90 meq/Potassium Chloride 15 meq/ Potassium Phosphate 10 mmol/ 

Magnesium Sulfate 10 meq/Calcium Gluconate 20 meq/ Multivitamins 10 ml/Chromium/

Copper/Manganese/ Seleni/Zn 0.5 ml/ Total Parenteral Nutrition/Amino 

Acids/Dextrose/ Fat Emulsion Intravenous 1,512 ml @  63 mls/hr TPN  CONT IV  

Last administered on 3/21/20at 20:57;  Start 3/21/20 at 22:00;  Stop 3/22/20 at 

21:59;  Status DC


Sodium Chloride 90 meq/Potassium Chloride 15 meq/ Potassium Phosphate 15 mmol/ 

Magnesium Sulfate 10 meq/Calcium Gluconate 20 meq/ Multivitamins 10 ml/Chromium/

Copper/Manganese/ Seleni/Zn 0.5 ml/ Total Parenteral Nutrition/Amino 

Acids/Dextrose/ Fat Emulsion Intravenous 1,512 ml @  63 mls/hr TPN  CONT IV ;  

Start 3/22/20 at 22:00;  Stop 3/22/20 at 14:16;  Status DC


Sodium Chloride 90 meq/Potassium Chloride 15 meq/ Potassium Phosphate 15 mmol/ 

Magnesium Sulfate 10 meq/Calcium Gluconate 20 meq/ Multivitamins 10 ml/Chromium/

Copper/Manganese/ Seleni/Zn 0.5 ml/ Total Parenteral Nutrition/Amino 

Acids/Dextrose/ Fat Emulsion Intravenous 1,200 ml @  50 mls/hr TPN  CONT IV ;  

Start 3/22/20 at 22:00;  Stop 3/22/20 at 14:17;  Status DC


Sodium Chloride 90 meq/Potassium Chloride 15 meq/ Potassium Phosphate 10 mmol/ 

Magnesium Sulfate 10 meq/Calcium Gluconate 20 meq/ Multivitamins 10 ml/Chromium/

Copper/Manganese/ Seleni/Zn 0.5 ml/ Total Parenteral Nutrition/Amino 

Acids/Dextrose/ Fat Emulsion Intravenous 1,200 ml @  50 mls/hr TPN  CONT IV  

Last administered on 3/22/20at 23:29;  Start 3/22/20 at 22:00;  Stop 3/23/20 at 

21:59;  Status DC


Sodium Chloride 1,000 ml @  1,000 mls/hr Q1H PRN IV hypotension;  Start 3/23/20 

at 07:28;  Stop 3/23/20 at 13:27;  Status DC


Albumin Human 200 ml @  200 mls/hr 1X  ONCE IV  Last administered on 3/23/20at 

08:51;  Start 3/23/20 at 07:30;  Stop 3/23/20 at 08:29;  Status DC


Diphenhydramine HCl (Benadryl) 25 mg 1X PRN  PRN IV ITCHING;  Start 3/23/20 at 

07:30;  Stop 3/24/20 at 07:29;  Status DC


Diphenhydramine HCl (Benadryl) 25 mg 1X PRN  PRN IV ITCHING;  Start 3/23/20 at 

07:30;  Stop 3/24/20 at 07:29;  Status DC


Sodium Chloride 1,000 ml @  400 mls/hr Q2H30M PRN IV PATENCY;  Start 3/23/20 at 

07:28;  Stop 3/23/20 at 19:27;  Status DC


Info (PHARMACY MONITORING -- do not chart) 1 each PRN DAILY  PRN MC SEE 

COMMENTS;  Start 3/23/20 at 07:30;  Stop 4/3/20 at 13:01;  Status DC


Metronidazole 100 ml @  100 mls/hr Q6HRS IV  Last administered on 4/6/20at 

06:16;  Start 3/23/20 at 08:30


Micafungin Sodium 100 mg/Dextrose 100 ml @  100 mls/hr Q24H IV  Last 

administered on 4/5/20at 08:51;  Start 3/23/20 at 09:00


Propofol 0 ml @ As Directed STK-MED ONCE IV ;  Start 3/23/20 at 07:53;  Stop 

3/23/20 at 07:53;  Status DC


Etomidate (Amidate) 20 mg STK-MED ONCE IV ;  Start 3/23/20 at 07:53;  Stop 

3/23/20 at 07:54;  Status DC


Midazolam HCl (Versed) 5 mg STK-MED ONCE .ROUTE ;  Start 3/23/20 at 07:57;  Stop

3/23/20 at 07:57;  Status DC


Fentanyl Citrate 30 ml @ 0 mls/hr CONT  PRN IV SEE PROTOCOL Last administered on

4/6/20at 05:40;  Start 3/23/20 at 08:15


Artificial Tears (Artificial Tears) 1 drop PRN Q1HR  PRN OU DRY EYE;  Start 

3/23/20 at 08:15


Midazolam HCl 50 mg/Sodium Chloride 50 ml @ 0 mls/hr CONT  PRN IV SEE PROTOCOL 

Last administered on 3/26/20at 22:39;  Start 3/23/20 at 08:15;  Stop 3/28/20 at 

15:59;  Status DC


Etomidate (Amidate) 8 mg 1X  ONCE IV  Last administered on 3/23/20at 08:33;  

Start 3/23/20 at 08:30;  Stop 3/23/20 at 08:31;  Status DC


Succinylcholine Chloride (Anectine) 120 mg 1X  ONCE IV  Last administered on 

3/23/20at 08:34;  Start 3/23/20 at 08:30;  Stop 3/23/20 at 08:31;  Status DC


Midazolam HCl (Versed) 5 mg 1X  ONCE IV ;  Start 3/23/20 at 08:30;  Stop 3/23/20

at 08:31;  Status DC


Potassium Chloride 15 meq/ Bicarbonate Dialysis Soln w/ out KCl 5,007.5 ml  @ 

1,000 mls/ hr Q5H1M IV  Last administered on 3/24/20at 11:11;  Start 3/23/20 at 

12:00;  Stop 3/24/20 at 11:15;  Status DC


Potassium Chloride 15 meq/ Bicarbonate Dialysis Soln w/ out KCl 5,007.5 ml  @ 

1,000 mls/ hr Q5H1M IV  Last administered on 3/24/20at 11:12;  Start 3/23/20 at 

12:00;  Stop 3/24/20 at 11:17;  Status DC


Potassium Chloride 15 meq/ Bicarbonate Dialysis Soln w/ out KCl 5,007.5 ml  @ 

1,000 mls/ hr Q5H1M IV  Last administered on 3/24/20at 11:11;  Start 3/23/20 at 

12:00;  Stop 3/24/20 at 11:19;  Status DC


Sodium Chloride 90 meq/Potassium Chloride 15 meq/ Potassium Phosphate 10 mmol/ 

Magnesium Sulfate 10 meq/Calcium Gluconate 20 meq/ Multivitamins 10 ml/Chromium/

Copper/Manganese/ Seleni/Zn 0.5 ml/ Total Parenteral Nutrition/Amino 

Acids/Dextrose/ Fat Emulsion Intravenous 1,400 ml @  58.333 mls/ hr TPN  CONT IV

 Last administered on 3/23/20at 21:42;  Start 3/23/20 at 22:00;  Stop 3/24/20 at

21:59;  Status DC


Heparin Sodium (Porcine) (Heparin Sodium) 5,000 unit Q8HRS SQ  Last administered

on 3/28/20at 05:55;  Start 3/23/20 at 15:00;  Stop 3/28/20 at 13:28;  Status DC


Meropenem 500 mg/ Sodium Chloride 50 ml @  100 mls/hr Q6HRS IV  Last a

dministered on 3/25/20at 06:00;  Start 3/24/20 at 09:00;  Stop 3/25/20 at 07:29;

 Status DC


Potassium Phosphate 20 mmol/ Sodium Chloride 106.6667 ml @  51.667 m... 1X  ONCE

IV  Last administered on 3/24/20at 11:22;  Start 3/24/20 at 10:15;  Stop 3/24/20

at 12:18;  Status DC


Acetaminophen (Tylenol Supp) 650 mg PRN Q6HRS  PRN IA MILD PAIN / TEMP Last 

administered on 3/24/20at 10:37;  Start 3/24/20 at 10:30


Potassium Chloride/Water 100 ml @  100 mls/hr Q1H IV  Last administered on 

3/24/20at 12:12;  Start 3/24/20 at 11:00;  Stop 3/24/20 at 12:59;  Status DC


Potassium Chloride 20 meq/ Bicarbonate Dialysis Soln w/ out KCl 5,010 ml @  1,00

0 mls/hr Q5H1M IV  Last administered on 3/25/20at 08:48;  Start 3/24/20 at 

12:00;  Stop 3/25/20 at 13:03;  Status DC


Potassium Chloride 20 meq/ Bicarbonate Dialysis Soln w/ out KCl 5,010 ml @  

1,000 mls/hr Q5H1M IV  Last administered on 3/29/20at 14:52;  Start 3/24/20 at 

11:30;  Stop 3/29/20 at 19:59;  Status DC


Potassium Chloride 20 meq/ Bicarbonate Dialysis Soln w/ out KCl 5,010 ml @  

1,000 mls/hr Q5H1M IV  Last administered on 3/29/20at 14:53;  Start 3/24/20 at 

11:30;  Stop 3/29/20 at 19:59;  Status DC


Sodium Chloride 90 meq/Potassium Chloride 15 meq/ Potassium Phosphate 15 mmol/ 

Magnesium Sulfate 10 meq/Calcium Gluconate 15 meq/ Multivitamins 10 ml/Chromium/

Copper/Manganese/ Seleni/Zn 0.5 ml/ Total Parenteral Nutrition/Amino 

Acids/Dextrose/ Fat Emulsion Intravenous 1,400 ml @  58.333 mls/ hr TPN  CONT IV

 Last administered on 3/24/20at 22:17;  Start 3/24/20 at 22:00;  Stop 3/25/20 at

21:59;  Status DC


Cefepime HCl (Maxipime) 2 gm Q12HR IVP  Last administered on 4/5/20at 21:18;  

Start 3/25/20 at 09:00


Daptomycin 500 mg/ Sodium Chloride 50 ml @  100 mls/hr Q48H IV  Last 

administered on 4/4/20at 15:01;  Start 3/25/20 at 08:30


Lidocaine HCl (Buffered Lidocaine 1%) 3 ml 1X  ONCE INJ  Last administered on 

3/25/20at 10:27;  Start 3/25/20 at 10:30;  Stop 3/25/20 at 10:31;  Status DC


Potassium Phosphate 20 mmol/ Sodium Chloride 106.6667 ml @  51.667 m... 1X  ONCE

IV  Last administered on 3/25/20at 12:51;  Start 3/25/20 at 13:00;  Stop 3/25/20

at 15:03;  Status DC


Sodium Chloride 90 meq/Potassium Chloride 15 meq/ Potassium Phosphate 18 mmol/ 

Magnesium Sulfate 8 meq/Calcium Gluconate 15 meq/ Multivitamins 10 ml/Chromium/ 

Copper/Manganese/ Seleni/Zn 0.5 ml/ Total Parenteral Nutrition/Amino 

Acids/Dextrose/ Fat Emulsion Intravenous 1,400 ml @  58.333 mls/ hr TPN  CONT IV

 Last administered on 3/25/20at 22:16;  Start 3/25/20 at 22:00;  Stop 3/26/20 at

21:59;  Status DC


Potassium Chloride 20 meq/ Bicarbonate Dialysis Soln w/ out KCl 5,010 ml @  

1,000 mls/hr Q5H1M IV  Last administered on 3/29/20at 14:54;  Start 3/25/20 at 1

6:00;  Stop 3/29/20 at 19:59;  Status DC


Multi-Ingred Cream/Lotion/Oil/ Oint (Artificial Tears Eye Ointment) 1 thomas PRN 

Q1HR  PRN OU DRY EYE Last administered on 4/3/20at 11:05;  Start 3/25/20 at 

17:30


Sodium Chloride 90 meq/Potassium Chloride 15 meq/ Potassium Phosphate 18 mmol/ 

Magnesium Sulfate 8 meq/Calcium Gluconate 15 meq/ Multivitamins 10 ml/Chromium/ 

Copper/Manganese/ Seleni/Zn 0.5 ml/ Total Parenteral Nutrition/Amino 

Acids/Dextrose/ Fat Emulsion Intravenous 1,400 ml @  58.333 mls/ hr TPN  CONT IV

 Last administered on 3/26/20at 22:00;  Start 3/26/20 at 22:00;  Stop 3/27/20 at

21:59;  Status DC


Albumin Human 500 ml @  125 mls/hr 1X  ONCE IV ;  Start 3/26/20 at 14:15;  Stop 

3/26/20 at 18:14;  Status DC


Sodium Chloride 90 meq/Potassium Chloride 15 meq/ Potassium Phosphate 18 mmol/ 

Magnesium Sulfate 8 meq/Calcium Gluconate 15 meq/ Multivitamins 10 ml/Chromium/ 

Copper/Manganese/ Seleni/Zn 0.5 ml/ Insulin Human Regular 10 unit/ Total 

Parenteral Nutrition/Amino Acids/Dextrose/ Fat Emulsion Intravenous 1,400 ml @  

58.333 mls/ hr TPN  CONT IV  Last administered on 3/27/20at 21:43;  Start 

3/27/20 at 22:00;  Stop 3/28/20 at 21:59;  Status DC


Lidocaine HCl (Buffered Lidocaine 1%) 3 ml STK-MED ONCE .ROUTE ;  Start 3/25/20 

at 10:00;  Stop 3/27/20 at 13:57;  Status DC


Midazolam HCl 100 mg/Sodium Chloride 100 ml @ 7 mls/hr CONT  PRN IV SEE PROTOCOL

Last administered on 4/6/20at 00:40;  Start 3/28/20 at 16:00


Sodium Chloride 90 meq/Potassium Chloride 15 meq/ Potassium Phosphate 18 mmol/ 

Magnesium Sulfate 8 meq/Calcium Gluconate 15 meq/ Multivitamins 10 ml/Chromium/ 

Copper/Manganese/ Seleni/Zn 0.5 ml/ Insulin Human Regular 15 unit/ Total 

Parenteral Nutrition/Amino Acids/Dextrose/ Fat Emulsion Intravenous 1,400 ml @  

58.333 mls/ hr TPN  CONT IV  Last administered on 3/28/20at 20:34;  Start 

3/28/20 at 22:00;  Stop 3/29/20 at 21:59;  Status DC


Info ([facility] Electrolyte Protocol) 1 ea CONT PRN  PRN MC PER PROTOCOL;  Start 

3/29/20 at 13:15


Sodium Chloride 90 meq/Potassium Chloride 15 meq/ Potassium Phosphate 18 mmol/ 

Magnesium Sulfate 8 meq/Calcium Gluconate 15 meq/ Multivitamins 10 ml/Chromium/ 

Copper/Manganese/ Seleni/Zn 0.5 ml/ Insulin Human Regular 15 unit/ Total 

Parenteral Nutrition/Amino Acids/Dextrose/ Fat Emulsion Intravenous 1,400 ml @  

58.333 mls/ hr TPN  CONT IV  Last administered on 3/29/20at 22:05;  Start 

3/29/20 at 22:00;  Stop 3/30/20 at 21:59;  Status DC


Potassium Chloride 15 meq/ Bicarbonate Dialysis Soln w/ out KCl 5,007.5 ml  @ 

1,000 mls/ hr Q5H1M IV  Last administered on 4/1/20at 18:14;  Start 3/29/20 at 

20:00;  Stop 4/2/20 at 13:08;  Status DC


Potassium Chloride 15 meq/ Bicarbonate Dialysis Soln w/ out KCl 5,007.5 ml  @ 

1,000 mls/ hr Q5H1M IV  Last administered on 4/1/20at 18:14;  Start 3/29/20 at 

20:00;  Stop 4/2/20 at 13:08;  Status DC


Potassium Chloride 15 meq/ Bicarbonate Dialysis Soln w/ out KCl 5,007.5 ml  @ 

1,000 mls/ hr Q5H1M IV  Last administered on 4/1/20at 18:14;  Start 3/29/20 at 

20:00;  Stop 4/2/20 at 13:08;  Status DC


Iohexol (Omnipaque 240 Mg/ml) 30 ml 1X  ONCE PO  Last administered on 3/30/20at 

11:30;  Start 3/30/20 at 11:30;  Stop 3/30/20 at 11:33;  Status DC


Info (CONTRAST GIVEN -- Rx MONITORING) 1 each PRN DAILY  PRN MC SEE COMMENTS;  

Start 3/30/20 at 11:45;  Stop 4/1/20 at 11:44;  Status DC


Sodium Chloride 90 meq/Potassium Chloride 15 meq/ Potassium Phosphate 18 mmol/ 

Magnesium Sulfate 8 meq/Calcium Gluconate 15 meq/ Multivitamins 10 ml/Chromium/ 

Copper/Manganese/ Seleni/Zn 0.5 ml/ Insulin Human Regular 15 unit/ Total 

Parenteral Nutrition/Amino Acids/Dextrose/ Fat Emulsion Intravenous 1,400 ml @  

58.333 mls/ hr TPN  CONT IV  Last administered on 3/30/20at 21:47;  Start 

3/30/20 at 22:00;  Stop 3/31/20 at 21:59;  Status DC


Sodium Chloride 90 meq/Potassium Chloride 15 meq/ Potassium Phosphate 18 mmol/ 

Magnesium Sulfate 8 meq/Calcium Gluconate 15 meq/ Multivitamins 10 ml/Chromium/ 

Copper/Manganese/ Seleni/Zn 0.5 ml/ Insulin Human Regular 20 unit/ Total 

Parenteral Nutrition/Amino Acids/Dextrose/ Fat Emulsion Intravenous 1,400 ml @  

58.333 mls/ hr TPN  CONT IV  Last administered on 3/31/20at 21:36;  Start 

3/31/20 at 22:00;  Stop 4/1/20 at 21:59;  Status DC


Alteplase, Recombinant (Cathflo For Central Catheter Clearance) 1 mg 1X  ONCE 

INT CAT  Last administered on 3/31/20at 20:03;  Start 3/31/20 at 19:30;  Stop 

3/31/20 at 19:46;  Status DC


Alteplase, Recombinant (Cathflo For Central Catheter Clearance) 1 mg 1X  ONCE 

INT CAT  Last administered on 3/31/20at 22:05;  Start 3/31/20 at 22:00;  Stop 

3/31/20 at 22:01;  Status DC


Sodium Chloride 90 meq/Potassium Chloride 15 meq/ Potassium Phosphate 18 mmol/ 

Magnesium Sulfate 8 meq/Calcium Gluconate 15 meq/ Multivitamins 10 ml/Chromium/ 

Copper/Manganese/ Seleni/Zn 0.5 ml/ Insulin Human Regular 20 unit/ Total 

Parenteral Nutrition/Amino Acids/Dextrose/ Fat Emulsion Intravenous 1,400 ml @  

58.333 mls/ hr TPN  CONT IV  Last administered on 4/1/20at 21:30;  Start 4/1/20 

at 22:00;  Stop 4/2/20 at 21:59;  Status DC


Dexmedetomidine HCl 400 mcg/ Sodium Chloride 100 ml @ 0 mls/hr CONT  PRN IV 

ANXIETY / AGITATION Last administered on 4/6/20at 05:39;  Start 4/2/20 at 08:15


Sodium Chloride 500 ml @  500 mls/hr 1X PRN  PRN IV ELEVATED BP, SEE COMMENTS;  

Start 4/2/20 at 08:15


Atropine Sulfate (ATROPINE 0.5mg SYRINGE) 0.5 mg PRN Q5MIN  PRN IV SEE COMMENTS;

 Start 4/2/20 at 08:15


Furosemide (Lasix) 20 mg 1X  ONCE IVP  Last administered on 4/2/20at 08:19;  

Start 4/2/20 at 08:15;  Stop 4/2/20 at 08:16;  Status DC


Lidocaine HCl (Buffered Lidocaine 1%) 3 ml STK-MED ONCE .ROUTE ;  Start 4/2/20 

at 08:39;  Stop 4/2/20 at 08:39;  Status DC


Lidocaine HCl (Buffered Lidocaine 1%) 6 ml 1X  ONCE INJ  Last administered on 

4/2/20at 09:05;  Start 4/2/20 at 09:00;  Stop 4/2/20 at 09:06;  Status DC


Sodium Chloride 90 meq/Potassium Chloride 15 meq/ Potassium Phosphate 18 mmol/ 

Magnesium Sulfate 8 meq/Calcium Gluconate 15 meq/ Multivitamins 10 ml/Chromium/ 

Copper/Manganese/ Seleni/Zn 0.5 ml/ Insulin Human Regular 20 unit/ Total 

Parenteral Nutrition/Amino Acids/Dextrose/ Fat Emulsion Intravenous 1,400 ml @  

58.333 mls/ hr TPN  CONT IV  Last administered on 4/2/20at 22:45;  Start 4/2/20 

at 22:00;  Stop 4/3/20 at 21:59;  Status DC


Sodium Chloride 1,000 ml @  1,000 mls/hr Q1H PRN IV hypotension;  Start 4/3/20 

at 07:30;  Stop 4/3/20 at 13:29;  Status DC


Albumin Human 200 ml @  200 mls/hr 1X PRN  PRN IV Hypotension Last administered 

on 4/3/20at 09:36;  Start 4/3/20 at 07:30;  Stop 4/3/20 at 13:29;  Status DC


Sodium Chloride (Normal Saline Flush) 10 ml 1X PRN  PRN IV AP catheter pack;  

Start 4/3/20 at 07:30;  Stop 4/3/20 at 21:29;  Status DC


Sodium Chloride (Normal Saline Flush) 10 ml 1X PRN  PRN IV  catheter pack;  S

tart 4/3/20 at 07:30;  Stop 4/4/20 at 07:29;  Status DC


Sodium Chloride 1,000 ml @  400 mls/hr Q2H30M PRN IV PATENCY;  Start 4/3/20 at 

07:30;  Stop 4/3/20 at 19:29;  Status DC


Info (PHARMACY MONITORING -- do not chart) 1 each PRN DAILY  PRN MC SEE COMMENTS

;  Start 4/3/20 at 07:30;  Stop 4/3/20 at 13:02;  Status DC


Info (PHARMACY MONITORING -- do not chart) 1 each PRN DAILY  PRN MC SEE 

COMMENTS;  Start 4/3/20 at 07:30;  Stop 4/5/20 at 12:45;  Status DC


Sodium Chloride 90 meq/Potassium Chloride 15 meq/ Potassium Phosphate 10 mmol/ 

Magnesium Sulfate 8 meq/Calcium Gluconate 15 meq/ Multivitamins 10 ml/Chromium/ 

Copper/Manganese/ Seleni/Zn 0.5 ml/ Insulin Human Regular 25 unit/ Total 

Parenteral Nutrition/Amino Acids/Dextrose/ Fat Emulsion Intravenous 1,400 ml @  

58.333 mls/ hr TPN  CONT IV  Last administered on 4/3/20at 22:19;  Start 4/3/20 

at 22:00;  Stop 4/4/20 at 21:59;  Status DC


Heparin Sodium (Porcine) (Heparin Sodium) 5,000 unit Q12HR SQ  Last administered

on 4/5/20at 21:21;  Start 4/3/20 at 21:00


Ondansetron HCl (Zofran) 4 mg PRN Q6HRS  PRN IV NAUSEA/VOMITING;  Start 4/6/20 

at 07:00;  Stop 4/7/20 at 06:59


Fentanyl Citrate (Fentanyl 2ml Vial) 25 mcg PRN Q5MIN  PRN IV MILD PAIN 1-3;  

Start 4/6/20 at 07:00;  Stop 4/7/20 at 06:59


Fentanyl Citrate (Fentanyl 2ml Vial) 50 mcg PRN Q5MIN  PRN IV MODERATE TO SEVERE

PAIN;  Start 4/6/20 at 07:00;  Stop 4/7/20 at 06:59


Ringer's Solution 1,000 ml @  30 mls/hr Q24H IV ;  Start 4/6/20 at 07:00;  Stop 

4/6/20 at 18:59


Lidocaine HCl (Xylocaine-Mpf 1% 2ml Vial) 2 ml PRN 1X  PRN ID PRIOR TO IV START;

 Start 4/6/20 at 07:00;  Stop 4/7/20 at 06:59


Prochlorperazine Edisylate (Compazine) 5 mg PACU PRN  PRN IV NAUSEA, MRX1;  

Start 4/6/20 at 07:00;  Stop 4/7/20 at 06:59


Sodium Chloride 1,000 ml @  1,000 mls/hr Q1H PRN IV hypotension;  Start 4/4/20 

at 09:10;  Stop 4/4/20 at 15:09;  Status DC


Albumin Human 200 ml @  200 mls/hr 1X PRN  PRN IV Hypotension Last administered 

on 4/4/20at 10:10;  Start 4/4/20 at 09:15;  Stop 4/4/20 at 15:14;  Status DC


Sodium Chloride 1,000 ml @  400 mls/hr Q2H30M PRN IV PATENCY;  Start 4/4/20 at 

09:10;  Stop 4/4/20 at 21:09;  Status DC


Info (PHARMACY MONITORING -- do not chart) 1 each PRN DAILY  PRN MC SEE 

COMMENTS;  Start 4/4/20 at 09:15;  Stop 4/5/20 at 12:45;  Status DC


Info (PHARMACY MONITORING -- do not chart) 1 each PRN DAILY  PRN MC SEE 

COMMENTS;  Start 4/4/20 at 09:15;  Stop 4/5/20 at 12:45;  Status DC


Sodium Chloride 90 meq/Potassium Chloride 15 meq/ Potassium Phosphate 10 mmol/ 

Magnesium Sulfate 8 meq/Calcium Gluconate 15 meq/ Multivitamins 10 ml/Chromium/ 

Copper/Manganese/ Seleni/Zn 0.5 ml/ Insulin Human Regular 25 unit/ Total 

Parenteral Nutrition/Amino Acids/Dextrose/ Fat Emulsion Intravenous 1,400 ml @  

58.333 mls/ hr TPN  CONT IV  Last administered on 4/4/20at 22:10;  Start 4/4/20 

at 22:00;  Stop 4/5/20 at 21:59;  Status DC


Magnesium Sulfate 50 ml @ 25 mls/hr PRN DAILY  PRN IV for Mag < 1.7 on am labs; 

Start 4/5/20 at 09:15


Sodium Chloride 90 meq/Potassium Chloride 15 meq/ Potassium Phosphate 10 mmol/ 

Magnesium Sulfate 8 meq/Calcium Gluconate 15 meq/ Multivitamins 10 ml/Chromium/ 

Copper/Manganese/ Seleni/Zn 0.5 ml/ Insulin Human Regular 25 unit/ Total 

Parenteral Nutrition/Amino Acids/Dextrose/ Fat Emulsion Intravenous 1,400 ml @  

58.333 mls/ hr TPN  CONT IV  Last administered on 4/5/20at 21:20;  Start 4/5/20 

at 22:00;  Stop 4/6/20 at 21:59


Sodium Chloride 1,000 ml @  1,000 mls/hr Q1H PRN IV hypotension;  Start 4/5/20 

at 12:23;  Stop 4/5/20 at 18:22;  Status DC


Albumin Human 200 ml @  200 mls/hr 1X  ONCE IV  Last administered on 4/5/20at 

13:34;  Start 4/5/20 at 12:30;  Stop 4/5/20 at 13:29;  Status DC


Diphenhydramine HCl (Benadryl) 25 mg 1X PRN  PRN IV ITCHING;  Start 4/5/20 at 

12:30;  Stop 4/6/20 at 12:29


Diphenhydramine HCl (Benadryl) 25 mg 1X PRN  PRN IV ITCHING;  Start 4/5/20 at 

12:30;  Stop 4/6/20 at 12:29


Info (PHARMACY MONITORING -- do not chart) 1 each PRN DAILY  PRN MC SEE 

COMMENTS;  Start 4/5/20 at 12:30





Active Scripts


Active


Reported


Bisoprolol Fumarate 5 Mg Tablet 10 Mg PO DAILY


Vitals/I & O





Vital Sign - Last 24 Hours








 4/5/20 4/5/20 4/5/20 4/5/20





 09:00 10:00 11:00 11:35


 


Pulse 80 80 80 


 


Resp 25 17 18 


 


B/P (MAP) 103/51 (68) 93/57 (69) 95/57 (70) 


 


Pulse Ox 99 99 100 100


 


O2 Delivery Ventilator Ventilator Ventilator Ventilator





 4/5/20 4/5/20 4/5/20 4/5/20





 12:00 12:00 13:56 14:33


 


Temp  99.4  





  99.4  


 


Pulse  81  


 


Resp  17  25


 


B/P (MAP)  103/55 (71)  


 


Pulse Ox  100 100 100


 


O2 Delivery Mechanical Ventilator Ventilator Ventilator Ventilator


 


    





    





 4/5/20 4/5/20 4/5/20 4/5/20





 15:25 18:00 18:10 19:00


 


Temp  98.4  





  98.4  


 


Pulse  85  84


 


Resp  23  18


 


B/P (MAP)  141/72 (95)  155/97 (116)


 


Pulse Ox 100 99 100 97


 


O2 Delivery Ventilator Ventilator Ventilator Ventilator


 


    





    





 4/5/20 4/5/20 4/5/20 4/5/20





 20:00 20:00 20:26 21:00


 


Temp 98.7   





 98.7   


 


Pulse 82   86


 


Resp 18   18


 


B/P (MAP) 124/67 (86)   


 


Pulse Ox 98  100 98


 


O2 Delivery Ventilator Mechanical Ventilator Ventilator Ventilator


 


    





    





 4/5/20 4/5/20 4/5/20 4/5/20





 21:25 22:00 22:09 23:00


 


Pulse  87  85


 


Resp 17 18 24 18


 


B/P (MAP)    124/83 (97)


 


Pulse Ox 100 100 98 99


 


O2 Delivery Ventilator Ventilator Ventilator Ventilator





 4/5/20 4/6/20 4/6/20 4/6/20





 23:27 00:00 00:00 01:00


 


Temp   99.2 





   99.2 


 


Pulse   83 79


 


Resp   18 18


 


B/P (MAP)   111/71 (84) 95/61 (72)


 


Pulse Ox 100  100 100


 


O2 Delivery Ventilator Mechanical Ventilator Ventilator Ventilator


 


    





    





 4/6/20 4/6/20 4/6/20 4/6/20





 02:00 02:02 03:00 04:00


 


Pulse 79  79 


 


Resp 18  18 


 


B/P (MAP) 111/64 (80)  91/58 (69) 


 


Pulse Ox 100 100 99 


 


O2 Delivery Ventilator Ventilator Ventilator Mechanical Ventilator





 4/6/20 4/6/20 4/6/20 4/6/20





 04:00 04:45 05:00 05:40


 


Temp 98.4   





 98.4   


 


Pulse 79  79 


 


Resp 18  18 18


 


B/P (MAP) 88/54 (65)  96/53 (67) 


 


Pulse Ox 100 100 100 100


 


O2 Delivery Ventilator Ventilator Ventilator Ventilator


 


    





    





 4/6/20 4/6/20 4/6/20 4/6/20





 06:00 06:26 07:00 07:39


 


Temp   97.7 





   97.7 


 


Pulse 78  77 


 


Resp 18 18 18 


 


B/P (MAP) 115/72 (86)  104/65 (78) 


 


Pulse Ox 98 99 100 100


 


O2 Delivery Ventilator Ventilator Ventilator Ventilator


 


    





    





 4/6/20   





 07:48   


 


O2 Delivery Mechanical Ventilator   














Intake and Output   


 


 4/5/20 4/5/20 4/6/20





 15:00 23:00 07:00


 


Intake Total 90 ml 479 ml 1363.2 ml


 


Output Total 100 ml 495 ml 92 ml


 


Balance -10 ml -16 ml 1271.2 ml











Hemodynamically unstable?:  No


Is patient in severe pain?:  No


Is NPO status required?:  Yes











HIRAM BARRERA MD              Apr 6, 2020 08:34

## 2020-04-06 NOTE — RAD
Chest AP portable at 1247:

 

Reason for examination: Confirm central line placement.

 

Comparison is made to previous study dated 4/6/2020 at 0420:

 

Tracheostomy tube present with the tip 3 cm above the nati. Right PICC 

line and right jugular hemodialysis catheter remain present and unchanged 

in position. Left central venous line is present with the tip at the 

proximal superior vena cava. NG tube is present with the tip below the 

hemidiaphragms. Heart and mediastinum are unchanged. There is no 

pneumothorax. There continues to be some discoid atelectasis at the right 

hilar region. There continue to be bilateral pleural effusions, right 

greater than left which appear to show some mild improvement. No acute 

bony abnormalities are seen.

 

IMPRESSION:

 

Tracheostomy tube now present. Left central venous catheter in the 

proximal superior vena cava. No pneumothorax evident. Continued presence 

of bilateral pleural effusions, right greater than left with some mild 

improvement. Discoid atelectasis at the right hilum is unchanged.

 

Electronically signed by: Ladan Mckenna MD (4/6/2020 1:23 PM) [id number]

## 2020-04-06 NOTE — PDOC
Provider Note


Provider Note


Anesthesiology





Requested to change out Left supraclavicular TLC.  After sterile prep and drape,

J-wire easily passed through old TLC, then after removal of old TLC a new 

catheter was easily passed over the J-wire and sutured in place.  CXR pending to

confirm placement.





Jason Lim MD





Hemodynamically unstable?:  No


Is patient in severe pain?:  No


Is NPO status required?:  Yes











LUIS LIM MD            Apr 6, 2020 14:40

## 2020-04-06 NOTE — PDOC
PROGRESS NOTES


Chief Complaint


Chief Complaint


Late entry for 4/5/2020


Respiratory failure requiring mechanical ventilation


Severe Acute gallstone pancreatitis (not a surgical candidate at this time) with

necrosis


Acute kidney failure now requiring dialysis


Salpingitis


Gallstones (Calculus of gallbladder with acute cholecystitis without o

bstruction)


HTN 


Leukocytosis 


Hypoxia


Uterine fibroid


Hypoxia with respiratory failure


Intractable pain


Intractable nausea


Covid 19 negative. 


Acute on chronic anemia will have to follow up since there is suspicion for 

hemorrhagic fluid in pelvis





Plan:





continue supportive measures


grim prognosis


discussed with surgical consultant


planning of trach for monday if unable to extubate





History of Present Illness


History of Present Illness


4/5/2020


No acute events reported overnight, case discussed with nursing staff patient in

no acute distress during my visit





4/4/2020


No acute events reported overnight, patient receiving dialysis today, case 

discussed with nursing staff patient in no acute distress during my visit








4/3/2020


No new changes remains critically ill, off CRRT, still planning for trach on 

Monday unless we manage to wean over the weekend





4/2/2020


Continues to remain critically ill.  The patient unable to be taken off 

ventilatory support as of yet.,  Discussed with pulmonary consultant.  Planning 

all tracheostomy on Monday if he is unable to be weaned off vent





4/1/2020


No acute events reported overnight, no fever or chills, remains critically 

stable, discussed with mother at bedside. 





[id number]


Patient seen and examined in the ICU


She is critically ill


Mechanically ventilated


Assist-control/25/450/30 with 8 of PEEP


She is on cefepime daptomycin Flagyl and micafungin GEN for antibiotic coverage


Also getting TPN and CRRT


Sedated with Versed


Getting IV albumin also


She is tachycardic at 112 bpm


Temp max 100.0


White blood count is down from 45,000 35,000 today


Chart reviewed


Discussed with RN











[id number]


Patient seen and examined in the ICU


She remains very critically ill


Going for a CT of the abdomen chest and pelvis


Ventilated : On assist control 40% FiO2


Discussed with RN


Chart reviewed








[id number]


Patient seen and examined in the ICU


She is still on CRRT although we plan to change to hemodialysis soon


Chart reviewed


Discussed with RN


Hemoglobin down to 6.9 (suspect CRRT related , Will let nephrology consider 

transfusion while on dialysis)








[id number]


Patient seen and examined in the ICU


She is mechanically ventilated


Before meals/25/450/30%


Also on CRRT


Very critically ill


Chart reviewed


Discussed with RN











[id number]


Patient seen and examined in the ICU


She is now been transferred to the Covid 19 unit so we can rule out Covid and 

keep her in isolation


Very critically ill


Intubated and  ventilated


Assist control 40%


Chart reviewed


Discussed with RN


Still on CRRT


Getting a chest x-ray now


Very concerned about her prognosis








[id number]


Patient seen and examined in the ICU


She is extremely critically ill


Remains mechanically ventilated with assist control/25/450/40%


Also on continuous renal replacement therapy


Chart reviewed


Discussed with RN


She has TPN hanging


Also on pressors











[id number]


Patient seen and examined in the ICU


She is still requiring CRRT


On the vent with assist control but her FiO2 is down to 40% from yesterday


Slightly better but still very critically ill


Discussed with RN


Chart reviewed








[id number]


Patient seen and examined in the ICU


She remains extremely critically ill


On IV fentanyl IV Versed IV Doxy


On the vent


Assist-control/25/450/70%


She is critically ill


Discussed with RN


Chart reviewed


Patient is currently on CRRT as well








[id number]


Patient seen and examined in the ICU


She had to be intubated this morning


On assist-control 25/450/100% with 10 of PEEP and only satting 87%


She is extremely critically ill


I'm not sure if she will survive


Chart reviewed


Discussed with RN











Ms Tadeo is a 50yo F w/ PMHx HTN, prediabetes who presents the emergency room

complaints of abdominal pain. Patient described off and on 3 days. She states is

constant, described as a squeezing sensation in a band-like distribution. + 

nausea, vomiting.  She denies any fever or diarrhea.  Patient denies any 

abdominal surgical procedures.  She states is worse with movements, car ride.  

Pain initially was upper abdomen however now pretty much generalized.  Last 

bowel movement was 3/15/2020. Nothing makes her pain better.  Patient denies any

shortness of breath.  She does state the pain moves into her chest.  Denies any 

headache or visual changes.


Lipase [id number], , , Bilirubin 1.4.


CT abdomen confirms pancreatic inflammation, peripancreatic fluid and 

inflammatory changes around the pancreas consistent with pancreatitis. 

Cholelithiasis and 1.4cm uterine fibroid as well as possible left salpingitis. 

Admitted for further care


GI, General surgery, ID, Pulm consulted.





3/17: Overnight per report no urine output. Added dilaudid for pain, PICC placed

per IR. Renal US negative.Seen bedside in ICU, given 2L additional NSS and 

albumin infusion. Still hypotensive, started on levophed. Repeat CT abdomen with

necrosis. Updated her fiancee


3/18: Sats are only 87% on nasal cannula oxygen. Dialysis catheter per 

nephrology


3/19: She is now on BiPAP appears more ill, now on dialysis


3/20: Seen on BiPAP. Her mother and another family member are present and seemed

to be good support for her. Currently on dialysis. Appears critically ill


3/21: Overnight Tmax 101.7 , still on BiPAP FiO2 40%, still on low dose Levophed

gtt, TPN initiated. On dialysis





Overnight still febrile. Dialysis today. She wakes up and responds to pain. No 

CP.





Vitals


Vitals





Vital Signs








  Date Time  Temp Pulse Resp B/P (MAP) Pulse Ox O2 Delivery O2 Flow Rate FiO2


 


4/6/20 07:48      Mechanical Ventilator  


 


4/6/20 07:39     100   


 


4/6/20 07:00 97.7 77 18 104/65 (78)    





 97.7       











Physical Exam


Physical Exam


GENERAL: Orally intubated/sedated - generalized anasarca 


HEENT: Pupils equal, ETT, OGT 


NECK:  Supple 


LUNGS: Diminished aeration bases 


HEART:  S1, S2, regular 


ABDOMEN:  Distended, hypoactive BS, Rectal tube in place 


: [place]   


EXTREMITIES: Generalized edema, no cyanosis - some mottling, [place] boots & SCDs 

bilaterally 


DERMATOLOGIC:  Warm and dry.  No generalized rash.  


CENTRAL NERVOUS SYSTEM: Sedated 


RIJ Temp HDC, RUE-PICC & LIJ


General:  Other (sedated )


Heart:  Other (increased rate)


Lungs:  Other (dimished in BLL)


Abdomen:  Soft, Other (distended )


Extremities:  No edema, Other (SOME CLUBBING )


Skin:  Other (mottling noted to extremities )





Labs


LABS





Laboratory Tests








Test


 4/5/20


11:44 4/5/20


18:09 4/5/20


23:55 4/6/20


05:26


 


Glucose (Fingerstick)


 162 mg/dL


(70-99) 133 mg/dL


(70-99) 158 mg/dL


(70-99) 157 mg/dL


(70-99)


 


Test


 4/6/20


05:45 


 


 





 


Hemoglobin


 7.8 g/dL


(12.0-15.5) 


 


 





 


Sodium Level


 139 mmol/L


(136-145) 


 


 





 


Potassium Level


 4.2 mmol/L


(3.5-5.1) 


 


 





 


Chloride Level


 103 mmol/L


() 


 


 





 


Carbon Dioxide Level


 25 mmol/L


(21-32) 


 


 





 


Anion Gap 11 (6-14)    


 


Blood Urea Nitrogen


 48 mg/dL


(7-20) 


 


 





 


Creatinine


 1.8 mg/dL


(0.6-1.0) 


 


 





 


Estimated GFR


(Cockcroft-Gault) 29.9 


 


 


 





 


Glucose Level


 167 mg/dL


(70-99) 


 


 





 


Calcium Level


 8.6 mg/dL


(8.5-10.1) 


 


 





 


Phosphorus Level


 3.9 mg/dL


(2.6-4.7) 


 


 





 


Magnesium Level


 1.8 mg/dL


(1.8-2.4) 


 


 





 


Albumin


 2.6 g/dL


(3.4-5.0) 


 


 














Assessment and Plan


Assessmemt and Plan


Problems


Medical Problems:


(1) Acute pancreatitis


Status: Acute  





(2) Cholelithiasis


Status: Acute  











Comment


Review of Relevant


I have reviewed the following items josy (where applicable) has been applied.


Labs





Laboratory Tests








Test


 4/4/20


14:54 4/4/20


17:50 4/4/20


18:38 4/4/20


23:52


 


Urine Collection Type Unknown    


 


Urine Color Red    


 


Urine Clarity Cloudy    


 


Urine pH 5.0 (<5.0-8.0)    


 


Urine Specific Gravity


 1.025


(1.000-1.030) 


 


 





 


Urine Protein


 100 mg/dL


(NEG-TRACE) 


 


 





 


Urine Glucose (UA)


 Negative mg/dL


(NEG) 


 


 





 


Urine Ketones (Stick)


 Trace mg/dL


(NEG) 


 


 





 


Urine Blood Large (NEG)    


 


Urine Nitrite Positive (NEG)    


 


Urine Bilirubin Small (NEG)    


 


Urine Urobilinogen Dipstick


 0.2 mg/dL (0.2


mg/dL) 


 


 





 


Urine Leukocyte Esterase Small (NEG)    


 


Urine RBC


 Rare /HPF


(0-2) 


 


 





 


Urine WBC 1-4 /HPF (0-4)    


 


Urine Squamous Epithelial


Cells Occ /LPF 


 


 


 





 


Urine Amorphous Sediment Present /HPF    


 


Urine Bacteria 0 /HPF (0-FEW)    


 


O2 Saturation  98 % (92-99)   


 


Arterial Blood pH


 


 7.48


(7.35-7.45) 


 





 


Arterial Blood pCO2 at


Patient Temp 


 30 mmHg


(35-46) 


 





 


Arterial Blood pO2 at Patient


Temp 


 122 mmHg


() 


 





 


Arterial Blood HCO3


 


 22 mmol/L


(21-28) 


 





 


Arterial Blood Base Excess


 


 -1 mmol/L


(-3-3) 


 





 


FiO2  50   


 


Glucose (Fingerstick)


 


 


 171 mg/dL


(70-99) 173 mg/dL


(70-99)


 


Test


 4/5/20


06:00 4/5/20


06:17 4/5/20


08:00 4/5/20


11:44


 


White Blood Count


 9.6 x10^3/uL


(4.0-11.0) 


 


 





 


Red Blood Count


 2.42 x10^6/uL


(3.50-5.40) 


 


 





 


Hemoglobin


 7.8 g/dL


(12.0-15.5) 


 


 





 


Hematocrit


 23.8 %


(36.0-47.0) 


 


 





 


Mean Corpuscular Volume 98 fL ()    


 


Mean Corpuscular Hemoglobin 32 pg (25-35)    


 


Mean Corpuscular Hemoglobin


Concent 33 g/dL


(31-37) 


 


 





 


Red Cell Distribution Width


 19.5 %


(11.5-14.5) 


 


 





 


Platelet Count


 198 x10^3/uL


(140-400) 


 


 





 


Neutrophils (%) (Auto) 76 % (31-73)    


 


Lymphocytes (%) (Auto) 10 % (24-48)    


 


Monocytes (%) (Auto) 7 % (0-9)    


 


Eosinophils (%) (Auto) 7 % (0-3)    


 


Basophils (%) (Auto) 1 % (0-3)    


 


Neutrophils # (Auto)


 7.3 x10^3/uL


(1.8-7.7) 


 


 





 


Lymphocytes # (Auto)


 0.9 x10^3/uL


(1.0-4.8) 


 


 





 


Monocytes # (Auto)


 0.7 x10^3/uL


(0.0-1.1) 


 


 





 


Eosinophils # (Auto)


 0.6 x10^3/uL


(0.0-0.7) 


 


 





 


Basophils # (Auto)


 0.0 x10^3/uL


(0.0-0.2) 


 


 





 


Sodium Level


 139 mmol/L


(136-145) 


 


 





 


Potassium Level


 4.0 mmol/L


(3.5-5.1) 


 


 





 


Chloride Level


 104 mmol/L


() 


 


 





 


Carbon Dioxide Level


 26 mmol/L


(21-32) 


 


 





 


Anion Gap 9 (6-14)    


 


Blood Urea Nitrogen


 54 mg/dL


(7-20) 


 


 





 


Creatinine


 1.9 mg/dL


(0.6-1.0) 


 


 





 


Estimated GFR


(Cockcroft-Gault) 28.1 


 


 


 





 


BUN/Creatinine Ratio 28 (6-20)    


 


Glucose Level


 164 mg/dL


(70-99) 


 


 





 


Calcium Level


 8.3 mg/dL


(8.5-10.1) 


 


 





 


Total Bilirubin


 1.0 mg/dL


(0.2-1.0) 


 


 





 


Aspartate Amino Transf


(AST/SGOT) 47 U/L (15-37) 


 


 


 





 


Alanine Aminotransferase


(ALT/SGPT) 20 U/L (14-59) 


 


 


 





 


Alkaline Phosphatase


 81 U/L


() 


 


 





 


Total Protein


 5.0 g/dL


(6.4-8.2) 


 


 





 


Albumin


 2.3 g/dL


(3.4-5.0) 


 


 





 


Albumin/Globulin Ratio 0.9 (1.0-1.7)    


 


Glucose (Fingerstick)


 


 167 mg/dL


(70-99) 


 162 mg/dL


(70-99)


 


O2 Saturation   97 % (92-99)  


 


Arterial Blood pH


 


 


 7.42


(7.35-7.45) 





 


Arterial Blood pCO2 at


Patient Temp 


 


 35 mmHg


(35-46) 





 


Arterial Blood pO2 at Patient


Temp 


 


 104 mmHg


() 





 


Arterial Blood HCO3


 


 


 22 mmol/L


(21-28) 





 


Arterial Blood Base Excess


 


 


 -2 mmol/L


(-3-3) 





 


FiO2   45  


 


Test


 4/5/20


18:09 4/5/20


23:55 4/6/20


05:26 4/6/20


05:45


 


Glucose (Fingerstick)


 133 mg/dL


(70-99) 158 mg/dL


(70-99) 157 mg/dL


(70-99) 





 


Hemoglobin


 


 


 


 7.8 g/dL


(12.0-15.5)


 


Sodium Level


 


 


 


 139 mmol/L


(136-145)


 


Potassium Level


 


 


 


 4.2 mmol/L


(3.5-5.1)


 


Chloride Level


 


 


 


 103 mmol/L


()


 


Carbon Dioxide Level


 


 


 


 25 mmol/L


(21-32)


 


Anion Gap    11 (6-14) 


 


Blood Urea Nitrogen


 


 


 


 48 mg/dL


(7-20)


 


Creatinine


 


 


 


 1.8 mg/dL


(0.6-1.0)


 


Estimated GFR


(Cockcroft-Gault) 


 


 


 29.9 





 


Glucose Level


 


 


 


 167 mg/dL


(70-99)


 


Calcium Level


 


 


 


 8.6 mg/dL


(8.5-10.1)


 


Phosphorus Level


 


 


 


 3.9 mg/dL


(2.6-4.7)


 


Magnesium Level


 


 


 


 1.8 mg/dL


(1.8-2.4)


 


Albumin


 


 


 


 2.6 g/dL


(3.4-5.0)








Laboratory Tests








Test


 4/5/20


11:44 4/5/20


18:09 4/5/20


23:55 4/6/20


05:26


 


Glucose (Fingerstick)


 162 mg/dL


(70-99) 133 mg/dL


(70-99) 158 mg/dL


(70-99) 157 mg/dL


(70-99)


 


Test


 4/6/20


05:45 


 


 





 


Hemoglobin


 7.8 g/dL


(12.0-15.5) 


 


 





 


Sodium Level


 139 mmol/L


(136-145) 


 


 





 


Potassium Level


 4.2 mmol/L


(3.5-5.1) 


 


 





 


Chloride Level


 103 mmol/L


() 


 


 





 


Carbon Dioxide Level


 25 mmol/L


(21-32) 


 


 





 


Anion Gap 11 (6-14)    


 


Blood Urea Nitrogen


 48 mg/dL


(7-20) 


 


 





 


Creatinine


 1.8 mg/dL


(0.6-1.0) 


 


 





 


Estimated GFR


(Cockcroft-Gault) 29.9 


 


 


 





 


Glucose Level


 167 mg/dL


(70-99) 


 


 





 


Calcium Level


 8.6 mg/dL


(8.5-10.1) 


 


 





 


Phosphorus Level


 3.9 mg/dL


(2.6-4.7) 


 


 





 


Magnesium Level


 1.8 mg/dL


(1.8-2.4) 


 


 





 


Albumin


 2.6 g/dL


(3.4-5.0) 


 


 











Microbiology


4/4/20 Blood Culture - Preliminary, Resulted


         NO GROWTH AFTER 1 DAY


Medications





Current Medications


Sodium Chloride 1,000 ml @  1,000 mls/hr Q1H IV  Last administered on 3/16/20at 

03:00;  Start 3/16/20 at 03:00;  Stop 3/16/20 at 03:59;  Status DC


Ondansetron HCl (Zofran) 4 mg 1X  ONCE IVP  Last administered on 3/16/20at 

03:27;  Start 3/16/20 at 03:00;  Stop 3/16/20 at 03:01;  Status DC


Morphine Sulfate (Morphine Sulfate) 4 mg 1X  ONCE IV ;  Start 3/16/20 at 03:00; 

Stop 3/16/20 at 03:01;  Status Cancel


Ketorolac Tromethamine (Toradol 30mg Vial) 30 mg 1X  ONCE IV  Last administered 

on 3/16/20at 02:54;  Start 3/16/20 at 03:00;  Stop 3/16/20 at 03:01;  Status DC


Fentanyl Citrate (Fentanyl 2ml Vial) 25 mcg 1X  ONCE IVP  Last administered on 

3/16/20at 03:23;  Start 3/16/20 at 03:30;  Stop 3/16/20 at 03:31;  Status DC


Fentanyl Citrate (Fentanyl 2ml Vial) 100 mcg STK-MED ONCE .ROUTE ;  Start 

3/16/20 at 03:18;  Stop 3/16/20 at 03:18;  Status DC


Iohexol (Omnipaque 350 Mg/ml) 90 ml 1X  ONCE IV  Last administered on 3/16/20at 

03:25;  Start 3/16/20 at 03:30;  Stop 3/16/20 at 03:31;  Status DC


Info (CONTRAST GIVEN -- Rx MONITORING) 1 each PRN DAILY  PRN MC SEE COMMENTS;  

Start 3/16/20 at 03:30;  Stop 3/18/20 at 03:29;  Status DC


Hydromorphone HCl (Dilaudid) 0.5 mg 1X  ONCE IV  Last administered on 3/16/20at 

03:55;  Start 3/16/20 at 04:30;  Stop 3/16/20 at 04:32;  Status DC


Ondansetron HCl (Zofran) 4 mg PRN Q8HRS  PRN IV NAUSEA/VOMITING 1ST CHOICE;  

Start 3/16/20 at 05:00;  Stop 3/16/20 at 09:27;  Status DC


Morphine Sulfate (Morphine Sulfate) 2 mg PRN Q2HR  PRN IV SEVERE PAIN 7-10 Last 

administered on 3/17/20at 12:26;  Start 3/16/20 at 05:00;  Stop 3/17/20 at 

14:15;  Status DC


Sodium Chloride 1,000 ml @  125 mls/hr Q8H IV  Last administered on 3/16/20at 

20:56;  Start 3/16/20 at 05:00;  Stop 3/17/20 at 04:59;  Status DC


Hydromorphone HCl (Dilaudid) 0.5 mg PRN Q3HRS  PRN IV SEVERE PAIN 7-10 Last admi

nistered on 3/17/20at 10:06;  Start 3/16/20 at 05:00;  Stop 3/17/20 at 12:01;  

Status DC


Piperacillin Sod/ Tazobactam Sod 4.5 gm/Sodium Chloride 100 ml @  200 mls/hr 1X 

ONCE IV  Last administered on 3/16/20at 05:44;  Start 3/16/20 at 06:00;  Stop 

3/16/20 at 06:29;  Status DC


Ondansetron HCl (Zofran) 4 mg PRN Q4HRS  PRN IV NAUSEA/VOMITING 1ST CHOICE Last 

administered on 3/21/20at 16:15;  Start 3/16/20 at 09:30


Insulin Human Lispro (HumaLOG) 0-9 UNITS Q6HRS SQ  Last administered on 4/6/20at

06:18;  Start 3/16/20 at 09:30


Dextrose (Dextrose 50%-Water Syringe) 12.5 gm PRN Q15MIN  PRN IV SEE COMMENTS;  

Start 3/16/20 at 09:30


Pantoprazole Sodium (PROTONIX VIAL for IV PUSH) 40 mg DAILYAC IVP  Last 

administered on 4/5/20at 07:32;  Start 3/16/20 at 11:30


Prochlorperazine Edisylate (Compazine) 10 mg PRN Q6HRS  PRN IV NAUSEA/VOMITING, 

2nd CHOICE Last administered on 3/17/20at 00:42;  Start 3/16/20 at 17:45


Atenolol (Tenormin) 100 mg DAILY PO ;  Start 3/17/20 at 09:00;  Stop 3/16/20 at 

20:08;  Status DC


Metoprolol Tartrate (Lopressor Vial) 2.5 mg Q6HRS IVP  Last administered on 

3/17/20at 05:51;  Start 3/16/20 at 20:15;  Stop 3/17/20 at 10:02;  Status DC


Metoprolol Tartrate (Lopressor Vial) 5 mg Q6HRS IVP  Last administered on 

3/26/20at 00:12;  Start 3/17/20 at 10:15;  Stop 3/28/20 at 08:48;  Status DC


Hydromorphone HCl (Dilaudid) 1 mg PRN Q3HRS  PRN IV SEVERE PAIN 7-10 Last 

administered on 3/23/20at 05:13;  Start 3/17/20 at 12:00;  Stop 3/31/20 at 

00:25;  Status DC


Lidocaine HCl (Buffered Lidocaine 1%) 3 ml STK-MED ONCE .ROUTE ;  Start 3/17/20 

at 12:55;  Stop 3/17/20 at 12:56;  Status DC


Albumin Human 500 ml @  125 mls/hr 1X  ONCE IV  Last administered on 3/17/20at 

14:33;  Start 3/17/20 at 14:30;  Stop 3/17/20 at 18:32;  Status DC


Norepinephrine Bitartrate 8 mg/ Dextrose 258 ml @  17.299 mls/ hr CONT  PRN IV 

PER PROTOCOL Last administered on 4/5/20at 21:18;  Start 3/17/20 at 15:30


Sodium Chloride 1,000 ml @  125 mls/hr Q8H IV  Last administered on 3/17/20at 

21:04;  Start 3/17/20 at 16:00;  Stop 3/18/20 at 02:42;  Status DC


Albumin Human 500 ml @  125 mls/hr PRN BID  PRN IV After every 2L NSS & BP < 

90mm Last administered on 4/1/20at 14:21;  Start 3/17/20 at 16:00


Iohexol (Omnipaque 300 Mg/ml) 60 ml 1X  ONCE IV  Last administered on 3/17/20at 

17:20;  Start 3/17/20 at 17:00;  Stop 3/17/20 at 17:01;  Status DC


Info (CONTRAST GIVEN -- Rx MONITORING) 1 each PRN DAILY  PRN MC SEE COMMENTS;  

Start 3/17/20 at 17:00;  Stop 3/19/20 at 16:59;  Status DC


Meropenem 1 gm/ Sodium Chloride 100 ml @  200 mls/hr Q8HRS IV  Last administered

on 3/18/20at 05:45;  Start 3/17/20 at 20:00;  Stop 3/18/20 at 08:48;  Status DC


Furosemide (Lasix) 40 mg 1X  ONCE IVP  Last administered on 3/17/20at 22:12;  

Start 3/17/20 at 22:30;  Stop 3/17/20 at 22:31;  Status DC


Calcium Chloride 1000 mg/Sodium Chloride 110 ml @  220 mls/hr 1X  ONCE IV  Last 

administered on 3/17/20at 22:11;  Start 3/17/20 at 22:30;  Stop 3/17/20 at 

22:59;  Status DC


Albuterol Sulfate (Ventolin Neb Soln) 2.5 mg 1X  ONCE NEB  Last administered on 

3/18/20at 00:56;  Start 3/17/20 at 22:30;  Stop 3/17/20 at 22:31;  Status DC


Insulin Human Regular (HumuLIN R VIAL) 5 unit 1X  ONCE IV  Last administered on 

3/17/20at 22:14;  Start 3/17/20 at 22:30;  Stop 3/17/20 at 22:31;  Status DC


Magnesium Sulfate 50 ml @ 25 mls/hr 1X  ONCE IV  Last administered on 3/18/20at 

02:57;  Start 3/18/20 at 03:00;  Stop 3/18/20 at 04:59;  Status DC


Calcium Gluconate 1000 mg/Sodium Chloride 110 ml @  220 mls/hr 1X  ONCE IV  Last

administered on 3/18/20at 02:46;  Start 3/18/20 at 03:00;  Stop 3/18/20 at 

03:29;  Status DC


Sodium Chloride 1,000 ml @  200 mls/hr Q5H IV  Last administered on 3/18/20at 

02:46;  Start 3/18/20 at 03:00;  Stop 3/18/20 at 10:21;  Status DC


Calcium Gluconate 1000 mg/Sodium Chloride 110 ml @  220 mls/hr 1X  ONCE IV  Last

administered on 3/18/20at 03:21;  Start 3/18/20 at 03:30;  Stop 3/18/20 at 

03:59;  Status DC


Sodium Bicarbonate 50 meq/Sodium Chloride 1,050 ml @  75 mls/hr Q14H IV  Last 

administered on 3/22/20at 21:10;  Start 3/18/20 at 07:30;  Stop 3/23/20 at 

10:28;  Status DC


Calcium Gluconate 2000 mg/Sodium Chloride 120 ml @  220 mls/hr 1X  ONCE IV  Last

administered on 3/18/20at 09:05;  Start 3/18/20 at 07:30;  Stop 3/18/20 at 

08:02;  Status DC


Lidocaine HCl (Xylocaine-Mpf 1% 2ml Vial) 2 ml STK-MED ONCE .ROUTE ;  Start 

3/18/20 at 08:47;  Stop 3/18/20 at 08:47;  Status DC


Meropenem 500 mg/ Sodium Chloride 50 ml @  100 mls/hr Q12HR IV  Last 

administered on 3/23/20at 21:01;  Start 3/18/20 at 18:00;  Stop 3/24/20 at 

07:58;  Status DC


Lidocaine HCl (Buffered Lidocaine 1%) 3 ml STK-MED ONCE .ROUTE ;  Start 3/18/20 

at 09:46;  Stop 3/18/20 at 09:46;  Status DC


Lidocaine HCl (Buffered Lidocaine 1%) 6 ml 1X  ONCE INJ  Last administered on 

3/18/20at 10:26;  Start 3/18/20 at 10:15;  Stop 3/18/20 at 10:16;  Status DC


Info (Tpn Per Pharmacy) 1 each PRN DAILY  PRN MC SEE COMMENTS Last administered 

on 4/5/20at 10:46;  Start 3/18/20 at 12:00


Sodium Chloride 1,000 ml @  1,000 mls/hr Q1H PRN IV hypotension;  Start 3/18/20 

at 12:07;  Stop 3/18/20 at 18:06;  Status DC


Diphenhydramine HCl (Benadryl) 25 mg 1X PRN  PRN IV ITCHING;  Start 3/18/20 at 

12:15;  Stop 3/19/20 at 12:14;  Status DC


Diphenhydramine HCl (Benadryl) 25 mg 1X PRN  PRN IV ITCHING;  Start 3/18/20 at 

12:15;  Stop 3/19/20 at 12:14;  Status DC


Sodium Chloride 1,000 ml @  400 mls/hr Q2H30M PRN IV PATENCY;  Start 3/18/20 at 

12:07;  Stop 3/19/20 at 00:06;  Status DC


Info (PHARMACY MONITORING -- do not chart) 1 each PRN DAILY  PRN MC SEE COM

MENTS;  Start 3/18/20 at 12:15;  Stop 3/20/20 at 08:13;  Status DC


Sodium Chloride 90 meq/Calcium Gluconate 10 meq/ Multivitamins 10 ml/Chromium/ 

Copper/Manganese/ Seleni/Zn 1 ml/ Total Parenteral Nutrition/Amino 

Acids/Dextrose/ Fat Emulsion Intravenous 55.005 ml  @ 2.292 mls/hr TPN  CONT IV 

;  Start 3/18/20 at 22:00;  Stop 3/18/20 at 12:33;  Status DC


Info (Tpn Per Pharmacy) 1 each PRN DAILY  PRN MC SEE COMMENTS;  Start 3/18/20 at

12:30;  Status UNV


Sodium Chloride 90 meq/Calcium Gluconate 10 meq/ Multivitamins 10 ml/Chromium/ 

Copper/Manganese/ Seleni/Zn 0.5 ml/ Total Parenteral Nutrition/Amino 

Acids/Dextrose/ Fat Emulsion Intravenous 1,512 ml @  63 mls/hr TPN  CONT IV  

Last administered on 3/18/20at 22:06;  Start 3/18/20 at 22:00;  Stop 3/19/20 at 

21:59;  Status DC


Calcium Carbonate/ Glycine (Tums) 500 mg PRN AFTMEALHC  PRN PO INDIGESTION;  

Start 3/18/20 at 17:45


Calcium Gluconate (Calcium Gluconate) 2,000 mg 1X  ONCE IVP  Last administered 

on 3/19/20at 02:19;  Start 3/19/20 at 02:15;  Stop 3/19/20 at 02:16;  Status DC


Calcium Chloride 3000 mg/Sodium Chloride 1,030 ml @  50 mls/hr [id number] IV  Last 

administered on 3/21/20at 02:17;  Start 3/19/20 at 08:00;  Stop 3/21/20 at 

15:23;  Status DC


Lorazepam (Ativan Inj) 1 mg PRN Q4HRS  PRN IVP ANXIETY / AGITATION Last 

administered on 3/23/20at 00:34;  Start 3/19/20 at 09:00


Sodium Chloride 1,000 ml @  1,000 mls/hr Q1H PRN IV hypotension;  Start 3/19/20 

at 08:56;  Stop 3/19/20 at 14:55;  Status DC


Albumin Human 200 ml @  200 mls/hr 1X PRN  PRN IV Hypotension;  Start 3/19/20 at

09:00;  Stop 3/19/20 at 14:59;  Status DC


Diphenhydramine HCl (Benadryl) 25 mg 1X PRN  PRN IV ITCHING;  Start 3/19/20 at 

09:00;  Stop 3/20/20 at 08:59;  Status DC


Diphenhydramine HCl (Benadryl) 25 mg 1X PRN  PRN IV ITCHING;  Start 3/19/20 at 

09:00;  Stop 3/20/20 at 08:59;  Status DC


Sodium Chloride 1,000 ml @  400 mls/hr Q2H30M PRN IV PATENCY;  Start 3/19/20 at 

08:56;  Stop 3/19/20 at 20:55;  Status DC


Info (PHARMACY MONITORING -- do not chart) 1 each PRN DAILY  PRN MC SEE 

COMMENTS;  Start 3/19/20 at 09:00;  Status UNV


Info (PHARMACY MONITORING -- do not chart) 1 each PRN DAILY  PRN MC SEE 

COMMENTS;  Start 3/19/20 at 09:00;  Stop 3/20/20 at 08:13;  Status DC


Digoxin (Lanoxin) 500 mcg 1X  ONCE IV  Last administered on 3/19/20at 10:04;  

Start 3/19/20 at 10:00;  Stop 3/19/20 at 10:01;  Status DC


Digoxin (Lanoxin) 125 mcg 1X  ONCE IV  Last administered on 3/19/20at 17:10;  

Start 3/19/20 at 18:00;  Stop 3/19/20 at 18:01;  Status DC


Magnesium Sulfate 100 ml @  25 mls/hr 1X  ONCE IV  Last administered on 3/

19/20at 12:48;  Start 3/19/20 at 13:00;  Stop 3/19/20 at 16:59;  Status DC


Sodium Chloride 90 meq/Magnesium Sulfate 10 meq/ Calcium Gluconate 20 meq/ 

Multivitamins 10 ml/Chromium/ Copper/Manganese/ Seleni/Zn 0.5 ml/ Total 

Parenteral Nutrition/Amino Acids/Dextrose/ Fat Emulsion Intravenous 1,512 ml @  

63 mls/hr TPN  CONT IV  Last administered on 3/19/20at 22:25;  Start 3/19/20 at 

22:00;  Stop 3/20/20 at 21:59;  Status DC


Sodium Chloride 1,000 ml @  1,000 mls/hr Q1H PRN IV hypotension;  Start 3/20/20 

at 08:05;  Stop 3/20/20 at 14:04;  Status DC


Albumin Human 200 ml @  200 mls/hr 1X  ONCE IV  Last administered on 3/20/20at 

08:57;  Start 3/20/20 at 08:15;  Stop 3/20/20 at 09:14;  Status DC


Diphenhydramine HCl (Benadryl) 25 mg 1X PRN  PRN IV ITCHING;  Start 3/20/20 at 

08:15;  Stop 3/21/20 at 08:14;  Status DC


Diphenhydramine HCl (Benadryl) 25 mg 1X PRN  PRN IV ITCHING;  Start 3/20/20 at 0

8:15;  Stop 3/21/20 at 08:14;  Status DC


Sodium Chloride 1,000 ml @  400 mls/hr Q2H30M PRN IV PATENCY;  Start 3/20/20 at 

08:05;  Stop 3/20/20 at 20:04;  Status DC


Info (PHARMACY MONITORING -- do not chart) 1 each PRN DAILY  PRN MC SEE 

COMMENTS;  Start 3/20/20 at 08:15;  Stop 3/24/20 at 07:57;  Status DC


Sodium Chloride 90 meq/Potassium Chloride 15 meq/ Potassium Phosphate 10 mmol/ 

Magnesium Sulfate 10 meq/Calcium Gluconate 20 meq/ Multivitamins 10 ml/Chromium/

Copper/Manganese/ Seleni/Zn 0.5 ml/ Total Parenteral Nutrition/Amino 

Acids/Dextrose/ Fat Emulsion Intravenous 1,512 ml @  63 mls/hr TPN  CONT IV  

Last administered on 3/20/20at 21:01;  Start 3/20/20 at 22:00;  Stop 3/21/20 at 

21:59;  Status DC


Potassium Chloride/Water 100 ml @  100 mls/hr 1X  ONCE IV  Last administered on 

3/20/20at 14:09;  Start 3/20/20 at 14:00;  Stop 3/20/20 at 14:59;  Status DC


Benzocaine (Hurricaine One) 1 spray 1X  ONCE MM  Last administered on 3/20/20at 

16:38;  Start 3/20/20 at 14:30;  Stop 3/20/20 at 14:31;  Status DC


Lidocaine HCl (Glydo (Lidocaine) Jelly) 1 thomas 1X  ONCE MM  Last administered on 

3/20/20at 16:38;  Start 3/20/20 at 14:30;  Stop 3/20/20 at 14:31;  Status DC


Linezolid/Dextrose 300 ml @  300 mls/hr Q12HR IV  Last administered on 3/26/20at

21:04;  Start 3/20/20 at 20:00;  Stop 3/27/20 at 07:50;  Status DC


Acetaminophen (Tylenol) 650 mg PRN Q6HRS  PRN PO MILD PAIN / TEMP;  Start 

3/21/20 at 03:30;  Stop 3/21/20 at 03:36;  Status DC


Acetaminophen (Tylenol) 650 mg PRN Q6HRS  PRN PEG MILD PAIN / TEMP Last 

administered on 3/26/20at 07:35;  Start 3/21/20 at 03:36


Sodium Chloride 1,000 ml @  1,000 mls/hr Q1H PRN IV hypotension;  Start 3/21/20 

at 07:50;  Stop 3/21/20 at 13:49;  Status DC


Albumin Human 200 ml @  200 mls/hr 1X PRN  PRN IV Hypotension;  Start 3/21/20 at

08:00;  Stop 3/21/20 at 13:59;  Status DC


Sodium Chloride (Normal Saline Flush) 10 ml 1X PRN  PRN IV AP catheter pack;  

Start 3/21/20 at 08:00;  Stop 3/22/20 at 07:59;  Status DC


Sodium Chloride (Normal Saline Flush) 10 ml 1X PRN  PRN IV  catheter pack;  

Start 3/21/20 at 08:00;  Stop 3/22/20 at 07:59;  Status DC


Sodium Chloride 1,000 ml @  400 mls/hr Q2H30M PRN IV PATENCY;  Start 3/21/20 at 

07:50;  Stop 3/21/20 at 19:49;  Status DC


Info (PHARMACY MONITORING -- do not chart) 1 each PRN DAILY  PRN MC SEE 

COMMENTS;  Start 3/21/20 at 08:00;  Status UNV


Info (PHARMACY MONITORING -- do not chart) 1 each PRN DAILY  PRN MC SEE 

COMMENTS;  Start 3/21/20 at 08:00;  Stop 3/23/20 at 08:25;  Status DC


Sodium Chloride 90 meq/Potassium Chloride 15 meq/ Potassium Phosphate 10 mmol/ 

Magnesium Sulfate 10 meq/Calcium Gluconate 20 meq/ Multivitamins 10 ml/Chromium/

Copper/Manganese/ Seleni/Zn 0.5 ml/ Total Parenteral Nutrition/Amino 

Acids/Dextrose/ Fat Emulsion Intravenous 1,512 ml @  63 mls/hr TPN  CONT IV  

Last administered on 3/21/20at 20:57;  Start 3/21/20 at 22:00;  Stop 3/22/20 at 

21:59;  Status DC


Sodium Chloride 90 meq/Potassium Chloride 15 meq/ Potassium Phosphate 15 mmol/ 

Magnesium Sulfate 10 meq/Calcium Gluconate 20 meq/ Multivitamins 10 ml/Chromium/

Copper/Manganese/ Seleni/Zn 0.5 ml/ Total Parenteral Nutrition/Amino 

Acids/Dextrose/ Fat Emulsion Intravenous 1,512 ml @  63 mls/hr TPN  CONT IV ;  

Start 3/22/20 at 22:00;  Stop 3/22/20 at 14:16;  Status DC


Sodium Chloride 90 meq/Potassium Chloride 15 meq/ Potassium Phosphate 15 mmol/ 

Magnesium Sulfate 10 meq/Calcium Gluconate 20 meq/ Multivitamins 10 ml/Chromium/

Copper/Manganese/ Seleni/Zn 0.5 ml/ Total Parenteral Nutrition/Amino 

Acids/Dextrose/ Fat Emulsion Intravenous 1,200 ml @  50 mls/hr TPN  CONT IV ;  

Start 3/22/20 at 22:00;  Stop 3/22/20 at 14:17;  Status DC


Sodium Chloride 90 meq/Potassium Chloride 15 meq/ Potassium Phosphate 10 mmol/ 

Magnesium Sulfate 10 meq/Calcium Gluconate 20 meq/ Multivitamins 10 ml/Chromium/

Copper/Manganese/ Seleni/Zn 0.5 ml/ Total Parenteral Nutrition/Amino 

Acids/Dextrose/ Fat Emulsion Intravenous 1,200 ml @  50 mls/hr TPN  CONT IV  

Last administered on 3/22/20at 23:29;  Start 3/22/20 at 22:00;  Stop 3/23/20 at 

21:59;  Status DC


Sodium Chloride 1,000 ml @  1,000 mls/hr Q1H PRN IV hypotension;  Start 3/23/20 

at 07:28;  Stop 3/23/20 at 13:27;  Status DC


Albumin Human 200 ml @  200 mls/hr 1X  ONCE IV  Last administered on 3/23/20at 

08:51;  Start 3/23/20 at 07:30;  Stop 3/23/20 at 08:29;  Status DC


Diphenhydramine HCl (Benadryl) 25 mg 1X PRN  PRN IV ITCHING;  Start 3/23/20 at 

07:30;  Stop 3/24/20 at 07:29;  Status DC


Diphenhydramine HCl (Benadryl) 25 mg 1X PRN  PRN IV ITCHING;  Start 3/23/20 at 

07:30;  Stop 3/24/20 at 07:29;  Status DC


Sodium Chloride 1,000 ml @  400 mls/hr Q2H30M PRN IV PATENCY;  Start 3/23/20 at 

07:28;  Stop 3/23/20 at 19:27;  Status DC


Info (PHARMACY MONITORING -- do not chart) 1 each PRN DAILY  PRN MC SEE COMMENTS

;  Start 3/23/20 at 07:30;  Stop 4/3/20 at 13:01;  Status DC


Metronidazole 100 ml @  100 mls/hr Q6HRS IV  Last administered on 4/6/20at 

06:16;  Start 3/23/20 at 08:30


Micafungin Sodium 100 mg/Dextrose 100 ml @  100 mls/hr Q24H IV  Last 

administered on 4/5/20at 08:51;  Start 3/23/20 at 09:00


Propofol 0 ml @ As Directed STK-MED ONCE IV ;  Start 3/23/20 at 07:53;  Stop 

3/23/20 at 07:53;  Status DC


Etomidate (Amidate) 20 mg STK-MED ONCE IV ;  Start 3/23/20 at 07:53;  Stop 

3/23/20 at 07:54;  Status DC


Midazolam HCl (Versed) 5 mg STK-MED ONCE .ROUTE ;  Start 3/23/20 at 07:57;  Stop

3/23/20 at 07:57;  Status DC


Fentanyl Citrate 30 ml @ 0 mls/hr CONT  PRN IV SEE PROTOCOL Last administered on

4/6/20at 05:40;  Start 3/23/20 at 08:15


Artificial Tears (Artificial Tears) 1 drop PRN Q1HR  PRN OU DRY EYE;  Start 

3/23/20 at 08:15


Midazolam HCl 50 mg/Sodium Chloride 50 ml @ 0 mls/hr CONT  PRN IV SEE PROTOCOL 

Last administered on 3/26/20at 22:39;  Start 3/23/20 at 08:15;  Stop 3/28/20 at 

15:59;  Status DC


Etomidate (Amidate) 8 mg 1X  ONCE IV  Last administered on 3/23/20at 08:33;  

Start 3/23/20 at 08:30;  Stop 3/23/20 at 08:31;  Status DC


Succinylcholine Chloride (Anectine) 120 mg 1X  ONCE IV  Last administered on 

3/23/20at 08:34;  Start 3/23/20 at 08:30;  Stop 3/23/20 at 08:31;  Status DC


Midazolam HCl (Versed) 5 mg 1X  ONCE IV ;  Start 3/23/20 at 08:30;  Stop 3/23/20

at 08:31;  Status DC


Potassium Chloride 15 meq/ Bicarbonate Dialysis Soln w/ out KCl 5,007.5 ml  @ 

1,000 mls/ hr Q5H1M IV  Last administered on 3/24/20at 11:11;  Start 3/23/20 at 

12:00;  Stop 3/24/20 at 11:15;  Status DC


Potassium Chloride 15 meq/ Bicarbonate Dialysis Soln w/ out KCl 5,007.5 ml  @ 

1,000 mls/ hr Q5H1M IV  Last administered on 3/24/20at 11:12;  Start 3/23/20 at 

12:00;  Stop 3/24/20 at 11:17;  Status DC


Potassium Chloride 15 meq/ Bicarbonate Dialysis Soln w/ out KCl 5,007.5 ml  @ 

1,000 mls/ hr Q5H1M IV  Last administered on 3/24/20at 11:11;  Start 3/23/20 at 

12:00;  Stop 3/24/20 at 11:19;  Status DC


Sodium Chloride 90 meq/Potassium Chloride 15 meq/ Potassium Phosphate 10 mmol/ 

Magnesium Sulfate 10 meq/Calcium Gluconate 20 meq/ Multivitamins 10 ml/Chromium/

Copper/Manganese/ Seleni/Zn 0.5 ml/ Total Parenteral Nutrition/Amino 

Acids/Dextrose/ Fat Emulsion Intravenous 1,400 ml @  58.333 mls/ hr TPN  CONT IV

 Last administered on 3/23/20at 21:42;  Start 3/23/20 at 22:00;  Stop 3/24/20 at

21:59;  Status DC


Heparin Sodium (Porcine) (Heparin Sodium) 5,000 unit Q8HRS SQ  Last administered

on 3/28/20at 05:55;  Start 3/23/20 at 15:00;  Stop 3/28/20 at 13:28;  Status DC


Meropenem 500 mg/ Sodium Chloride 50 ml @  100 mls/hr Q6HRS IV  Last 

administered on 3/25/20at 06:00;  Start 3/24/20 at 09:00;  Stop 3/25/20 at 

07:29;  Status DC


Potassium Phosphate 20 mmol/ Sodium Chloride 106.6667 ml @  51.667 m... 1X  ONCE

IV  Last administered on 3/24/20at 11:22;  Start 3/24/20 at 10:15;  Stop 3/24/20

at 12:18;  Status DC


Acetaminophen (Tylenol Supp) 650 mg PRN Q6HRS  PRN OR MILD PAIN / TEMP Last 

administered on 3/24/20at 10:37;  Start 3/24/20 at 10:30


Potassium Chloride/Water 100 ml @  100 mls/hr Q1H IV  Last administered on 

3/24/20at 12:12;  Start 3/24/20 at 11:00;  Stop 3/24/20 at 12:59;  Status DC


Potassium Chloride 20 meq/ Bicarbonate Dialysis Soln w/ out KCl 5,010 ml @  

1,000 mls/hr Q5H1M IV  Last administered on 3/25/20at 08:48;  Start 3/24/20 at 

12:00;  Stop 3/25/20 at 13:03;  Status DC


Potassium Chloride 20 meq/ Bicarbonate Dialysis Soln w/ out KCl 5,010 ml @  

1,000 mls/hr Q5H1M IV  Last administered on 3/29/20at 14:52;  Start 3/24/20 at 

11:30;  Stop 3/29/20 at 19:59;  Status DC


Potassium Chloride 20 meq/ Bicarbonate Dialysis Soln w/ out KCl 5,010 ml @  

1,000 mls/hr Q5H1M IV  Last administered on 3/29/20at 14:53;  Start 3/24/20 at 

11:30;  Stop 3/29/20 at 19:59;  Status DC


Sodium Chloride 90 meq/Potassium Chloride 15 meq/ Potassium Phosphate 15 mmol/ 

Magnesium Sulfate 10 meq/Calcium Gluconate 15 meq/ Multivitamins 10 ml/Chromium/

Copper/Manganese/ Seleni/Zn 0.5 ml/ Total Parenteral Nutrition/Amino 

Acids/Dextrose/ Fat Emulsion Intravenous 1,400 ml @  58.333 mls/ hr TPN  CONT IV

 Last administered on 3/24/20at 22:17;  Start 3/24/20 at 22:00;  Stop 3/25/20 at

21:59;  Status DC


Cefepime HCl (Maxipime) 2 gm Q12HR IVP  Last administered on 4/5/20at 21:18;  

Start 3/25/20 at 09:00


Daptomycin 500 mg/ Sodium Chloride 50 ml @  100 mls/hr Q48H IV  Last 

administered on 4/4/20at 15:01;  Start 3/25/20 at 08:30


Lidocaine HCl (Buffered Lidocaine 1%) 3 ml 1X  ONCE INJ  Last administered on 

3/25/20at 10:27;  Start 3/25/20 at 10:30;  Stop 3/25/20 at 10:31;  Status DC


Potassium Phosphate 20 mmol/ Sodium Chloride 106.6667 ml @  51.667 m... 1X  ONCE

IV  Last administered on 3/25/20at 12:51;  Start 3/25/20 at 13:00;  Stop 3/25/20

at 15:03;  Status DC


Sodium Chloride 90 meq/Potassium Chloride 15 meq/ Potassium Phosphate 18 mmol/ 

Magnesium Sulfate 8 meq/Calcium Gluconate 15 meq/ Multivitamins 10 ml/Chromium/ 

Copper/Manganese/ Seleni/Zn 0.5 ml/ Total Parenteral Nutrition/Amino Acids

/Dextrose/ Fat Emulsion Intravenous 1,400 ml @  58.333 mls/ hr TPN  CONT IV  

Last administered on 3/25/20at 22:16;  Start 3/25/20 at 22:00;  Stop 3/26/20 at 

21:59;  Status DC


Potassium Chloride 20 meq/ Bicarbonate Dialysis Soln w/ out KCl 5,010 ml @  

1,000 mls/hr Q5H1M IV  Last administered on 3/29/20at 14:54;  Start 3/25/20 at 

16:00;  Stop 3/29/20 at 19:59;  Status DC


Multi-Ingred Cream/Lotion/Oil/ Oint (Artificial Tears Eye Ointment) 1 thomas PRN 

Q1HR  PRN OU DRY EYE Last administered on 4/3/20at 11:05;  Start 3/25/20 at 

17:30


Sodium Chloride 90 meq/Potassium Chloride 15 meq/ Potassium Phosphate 18 mmol/ 

Magnesium Sulfate 8 meq/Calcium Gluconate 15 meq/ Multivitamins 10 ml/Chromium/ 

Copper/Manganese/ Seleni/Zn 0.5 ml/ Total Parenteral Nutrition/Amino Acids/De

xtrose/ Fat Emulsion Intravenous 1,400 ml @  58.333 mls/ hr TPN  CONT IV  Last 

administered on 3/26/20at 22:00;  Start 3/26/20 at 22:00;  Stop 3/27/20 at 

21:59;  Status DC


Albumin Human 500 ml @  125 mls/hr 1X  ONCE IV ;  Start 3/26/20 at 14:15;  Stop 

3/26/20 at 18:14;  Status DC


Sodium Chloride 90 meq/Potassium Chloride 15 meq/ Potassium Phosphate 18 mmol/ 

Magnesium Sulfate 8 meq/Calcium Gluconate 15 meq/ Multivitamins 10 ml/Chromium/ 

Copper/Manganese/ Seleni/Zn 0.5 ml/ Insulin Human Regular 10 unit/ Total 

Parenteral Nutrition/Amino Acids/Dextrose/ Fat Emulsion Intravenous 1,400 ml @  

58.333 mls/ hr TPN  CONT IV  Last administered on 3/27/20at 21:43;  Start 

3/27/20 at 22:00;  Stop 3/28/20 at 21:59;  Status DC


Lidocaine HCl (Buffered Lidocaine 1%) 3 ml STK-MED ONCE .ROUTE ;  Start 3/25/20 

at 10:00;  Stop 3/27/20 at 13:57;  Status DC


Midazolam HCl 100 mg/Sodium Chloride 100 ml @ 7 mls/hr CONT  PRN IV SEE PROTOCOL

Last administered on 4/6/20at 00:40;  Start 3/28/20 at 16:00


Sodium Chloride 90 meq/Potassium Chloride 15 meq/ Potassium Phosphate 18 mmol/ 

Magnesium Sulfate 8 meq/Calcium Gluconate 15 meq/ Multivitamins 10 ml/Chromium/ 

Copper/Manganese/ Seleni/Zn 0.5 ml/ Insulin Human Regular 15 unit/ Total 

Parenteral Nutrition/Amino Acids/Dextrose/ Fat Emulsion Intravenous 1,400 ml @  

58.333 mls/ hr TPN  CONT IV  Last administered on 3/28/20at 20:34;  Start 

3/28/20 at 22:00;  Stop 3/29/20 at 21:59;  Status DC


Info ([facility] Electrolyte Protocol) 1 ea CONT PRN  PRN MC PER PROTOCOL;  Start 

3/29/20 at 13:15


Sodium Chloride 90 meq/Potassium Chloride 15 meq/ Potassium Phosphate 18 mmol/ 

Magnesium Sulfate 8 meq/Calcium Gluconate 15 meq/ Multivitamins 10 ml/Chromium/ 

Copper/Manganese/ Seleni/Zn 0.5 ml/ Insulin Human Regular 15 unit/ Total 

Parenteral Nutrition/Amino Acids/Dextrose/ Fat Emulsion Intravenous 1,400 ml @  

58.333 mls/ hr TPN  CONT IV  Last administered on 3/29/20at 22:05;  Start 

3/29/20 at 22:00;  Stop 3/30/20 at 21:59;  Status DC


Potassium Chloride 15 meq/ Bicarbonate Dialysis Soln w/ out KCl 5,007.5 ml  @ 

1,000 mls/ hr Q5H1M IV  Last administered on 4/1/20at 18:14;  Start 3/29/20 at 

20:00;  Stop 4/2/20 at 13:08;  Status DC


Potassium Chloride 15 meq/ Bicarbonate Dialysis Soln w/ out KCl 5,007.5 ml  @ 

1,000 mls/ hr Q5H1M IV  Last administered on 4/1/20at 18:14;  Start 3/29/20 at 

20:00;  Stop 4/2/20 at 13:08;  Status DC


Potassium Chloride 15 meq/ Bicarbonate Dialysis Soln w/ out KCl 5,007.5 ml  @ 1

,000 mls/ hr Q5H1M IV  Last administered on 4/1/20at 18:14;  Start 3/29/20 at 

20:00;  Stop 4/2/20 at 13:08;  Status DC


Iohexol (Omnipaque 240 Mg/ml) 30 ml 1X  ONCE PO  Last administered on 3/30/20at 

11:30;  Start 3/30/20 at 11:30;  Stop 3/30/20 at 11:33;  Status DC


Info (CONTRAST GIVEN -- Rx MONITORING) 1 each PRN DAILY  PRN MC SEE COMMENTS;  

Start 3/30/20 at 11:45;  Stop 4/1/20 at 11:44;  Status DC


Sodium Chloride 90 meq/Potassium Chloride 15 meq/ Potassium Phosphate 18 mmol/ 

Magnesium Sulfate 8 meq/Calcium Gluconate 15 meq/ Multivitamins 10 ml/Chromium/ 

Copper/Manganese/ Seleni/Zn 0.5 ml/ Insulin Human Regular 15 unit/ Total Paren

teral Nutrition/Amino Acids/Dextrose/ Fat Emulsion Intravenous 1,400 ml @  

58.333 mls/ hr TPN  CONT IV  Last administered on 3/30/20at 21:47;  Start 

3/30/20 at 22:00;  Stop 3/31/20 at 21:59;  Status DC


Sodium Chloride 90 meq/Potassium Chloride 15 meq/ Potassium Phosphate 18 mmol/ 

Magnesium Sulfate 8 meq/Calcium Gluconate 15 meq/ Multivitamins 10 ml/Chromium/ 

Copper/Manganese/ Seleni/Zn 0.5 ml/ Insulin Human Regular 20 unit/ Total 

Parenteral Nutrition/Amino Acids/Dextrose/ Fat Emulsion Intravenous 1,400 ml @  

58.333 mls/ hr TPN  CONT IV  Last administered on 3/31/20at 21:36;  Start 

3/31/20 at 22:00;  Stop 4/1/20 at 21:59;  Status DC


Alteplase, Recombinant (Cathflo For Central Catheter Clearance) 1 mg 1X  ONCE 

INT CAT  Last administered on 3/31/20at 20:03;  Start 3/31/20 at 19:30;  Stop 

3/31/20 at 19:46;  Status DC


Alteplase, Recombinant (Cathflo For Central Catheter Clearance) 1 mg 1X  ONCE 

INT CAT  Last administered on 3/31/20at 22:05;  Start 3/31/20 at 22:00;  Stop 

3/31/20 at 22:01;  Status DC


Sodium Chloride 90 meq/Potassium Chloride 15 meq/ Potassium Phosphate 18 mmol/ 

Magnesium Sulfate 8 meq/Calcium Gluconate 15 meq/ Multivitamins 10 ml/Chromium/ 

Copper/Manganese/ Seleni/Zn 0.5 ml/ Insulin Human Regular 20 unit/ Total 

Parenteral Nutrition/Amino Acids/Dextrose/ Fat Emulsion Intravenous 1,400 ml @  

58.333 mls/ hr TPN  CONT IV  Last administered on 4/1/20at 21:30;  Start 4/1/20 

at 22:00;  Stop 4/2/20 at 21:59;  Status DC


Dexmedetomidine HCl 400 mcg/ Sodium Chloride 100 ml @ 0 mls/hr CONT  PRN IV 

ANXIETY / AGITATION Last administered on 4/6/20at 05:39;  Start 4/2/20 at 08:15


Sodium Chloride 500 ml @  500 mls/hr 1X PRN  PRN IV ELEVATED BP, SEE COMMENTS;  

Start 4/2/20 at 08:15


Atropine Sulfate (ATROPINE 0.5mg SYRINGE) 0.5 mg PRN Q5MIN  PRN IV SEE COMMENTS;

 Start 4/2/20 at 08:15


Furosemide (Lasix) 20 mg 1X  ONCE IVP  Last administered on 4/2/20at 08:19;  

Start 4/2/20 at 08:15;  Stop 4/2/20 at 08:16;  Status DC


Lidocaine HCl (Buffered Lidocaine 1%) 3 ml STK-MED ONCE .ROUTE ;  Start 4/2/20 

at 08:39;  Stop 4/2/20 at 08:39;  Status DC


Lidocaine HCl (Buffered Lidocaine 1%) 6 ml 1X  ONCE INJ  Last administered on 

4/2/20at 09:05;  Start 4/2/20 at 09:00;  Stop 4/2/20 at 09:06;  Status DC


Sodium Chloride 90 meq/Potassium Chloride 15 meq/ Potassium Phosphate 18 mmol/ 

Magnesium Sulfate 8 meq/Calcium Gluconate 15 meq/ Multivitamins 10 ml/Chromium/ 

Copper/Manganese/ Seleni/Zn 0.5 ml/ Insulin Human Regular 20 unit/ Total 

Parenteral Nutrition/Amino Acids/Dextrose/ Fat Emulsion Intravenous 1,400 ml @  

58.333 mls/ hr TPN  CONT IV  Last administered on 4/2/20at 22:45;  Start 4/2/20 

at 22:00;  Stop 4/3/20 at 21:59;  Status DC


Sodium Chloride 1,000 ml @  1,000 mls/hr Q1H PRN IV hypotension;  Start 4/3/20 

at 07:30;  Stop 4/3/20 at 13:29;  Status DC


Albumin Human 200 ml @  200 mls/hr 1X PRN  PRN IV Hypotension Last administered 

on 4/3/20at 09:36;  Start 4/3/20 at 07:30;  Stop 4/3/20 at 13:29;  Status DC


Sodium Chloride (Normal Saline Flush) 10 ml 1X PRN  PRN IV AP catheter pack;  

Start 4/3/20 at 07:30;  Stop 4/3/20 at 21:29;  Status DC


Sodium Chloride (Normal Saline Flush) 10 ml 1X PRN  PRN IV  catheter pack;  

Start 4/3/20 at 07:30;  Stop 4/4/20 at 07:29;  Status DC


Sodium Chloride 1,000 ml @  400 mls/hr Q2H30M PRN IV PATENCY;  Start 4/3/20 at 

07:30;  Stop 4/3/20 at 19:29;  Status DC


Info (PHARMACY MONITORING -- do not chart) 1 each PRN DAILY  PRN MC SEE 

COMMENTS;  Start 4/3/20 at 07:30;  Stop 4/3/20 at 13:02;  Status DC


Info (PHARMACY MONITORING -- do not chart) 1 each PRN DAILY  PRN MC SEE 

COMMENTS;  Start 4/3/20 at 07:30;  Stop 4/5/20 at 12:45;  Status DC


Sodium Chloride 90 meq/Potassium Chloride 15 meq/ Potassium Phosphate 10 mmol/ 

Magnesium Sulfate 8 meq/Calcium Gluconate 15 meq/ Multivitamins 10 ml/Chromium/ 

Copper/Manganese/ Seleni/Zn 0.5 ml/ Insulin Human Regular 25 unit/ Total Paren

teral Nutrition/Amino Acids/Dextrose/ Fat Emulsion Intravenous 1,400 ml @  

58.333 mls/ hr TPN  CONT IV  Last administered on 4/3/20at 22:19;  Start 4/3/20 

at 22:00;  Stop 4/4/20 at 21:59;  Status DC


Heparin Sodium (Porcine) (Heparin Sodium) 5,000 unit Q12HR SQ  Last administered

on 4/5/20at 21:21;  Start 4/3/20 at 21:00


Ondansetron HCl (Zofran) 4 mg PRN Q6HRS  PRN IV NAUSEA/VOMITING;  Start 4/6/20 

at 07:00;  Stop 4/7/20 at 06:59


Fentanyl Citrate (Fentanyl 2ml Vial) 25 mcg PRN Q5MIN  PRN IV MILD PAIN 1-3;  S

tart 4/6/20 at 07:00;  Stop 4/7/20 at 06:59


Fentanyl Citrate (Fentanyl 2ml Vial) 50 mcg PRN Q5MIN  PRN IV MODERATE TO SEVERE

PAIN;  Start 4/6/20 at 07:00;  Stop 4/7/20 at 06:59


Ringer's Solution 1,000 ml @  30 mls/hr Q24H IV ;  Start 4/6/20 at 07:00;  Stop 

4/6/20 at 18:59


Lidocaine HCl (Xylocaine-Mpf 1% 2ml Vial) 2 ml PRN 1X  PRN ID PRIOR TO IV START;

 Start 4/6/20 at 07:00;  Stop 4/7/20 at 06:59


Prochlorperazine Edisylate (Compazine) 5 mg PACU PRN  PRN IV NAUSEA, MRX1;  

Start 4/6/20 at 07:00;  Stop 4/7/20 at 06:59


Sodium Chloride 1,000 ml @  1,000 mls/hr Q1H PRN IV hypotension;  Start 4/4/20 

at 09:10;  Stop 4/4/20 at 15:09;  Status DC


Albumin Human 200 ml @  200 mls/hr 1X PRN  PRN IV Hypotension Last administered 

on 4/4/20at 10:10;  Start 4/4/20 at 09:15;  Stop 4/4/20 at 15:14;  Status DC


Sodium Chloride 1,000 ml @  400 mls/hr Q2H30M PRN IV PATENCY;  Start 4/4/20 at 

09:10;  Stop 4/4/20 at 21:09;  Status DC


Info (PHARMACY MONITORING -- do not chart) 1 each PRN DAILY  PRN MC SEE 

COMMENTS;  Start 4/4/20 at 09:15;  Stop 4/5/20 at 12:45;  Status DC


Info (PHARMACY MONITORING -- do not chart) 1 each PRN DAILY  PRN MC SEE 

COMMENTS;  Start 4/4/20 at 09:15;  Stop 4/5/20 at 12:45;  Status DC


Sodium Chloride 90 meq/Potassium Chloride 15 meq/ Potassium Phosphate 10 mmol/ 

Magnesium Sulfate 8 meq/Calcium Gluconate 15 meq/ Multivitamins 10 ml/Chromium/ 

Copper/Manganese/ Seleni/Zn 0.5 ml/ Insulin Human Regular 25 unit/ Total 

Parenteral Nutrition/Amino Acids/Dextrose/ Fat Emulsion Intravenous 1,400 ml @  

58.333 mls/ hr TPN  CONT IV  Last administered on 4/4/20at 22:10;  Start 4/4/20 

at 22:00;  Stop 4/5/20 at 21:59;  Status DC


Magnesium Sulfate 50 ml @ 25 mls/hr PRN DAILY  PRN IV for Mag < 1.7 on am labs; 

Start 4/5/20 at 09:15


Sodium Chloride 90 meq/Potassium Chloride 15 meq/ Potassium Phosphate 10 mmol/ 

Magnesium Sulfate 8 meq/Calcium Gluconate 15 meq/ Multivitamins 10 ml/Chromium/ 

Copper/Manganese/ Seleni/Zn 0.5 ml/ Insulin Human Regular 25 unit/ Total 

Parenteral Nutrition/Amino Acids/Dextrose/ Fat Emulsion Intravenous 1,400 ml @  

58.333 mls/ hr TPN  CONT IV  Last administered on 4/5/20at 21:20;  Start 4/5/20 

at 22:00;  Stop 4/6/20 at 21:59


Sodium Chloride 1,000 ml @  1,000 mls/hr Q1H PRN IV hypotension;  Start 4/5/20 

at 12:23;  Stop 4/5/20 at 18:22;  Status DC


Albumin Human 200 ml @  200 mls/hr 1X  ONCE IV  Last administered on 4/5/20at 

13:34;  Start 4/5/20 at 12:30;  Stop 4/5/20 at 13:29;  Status DC


Diphenhydramine HCl (Benadryl) 25 mg 1X PRN  PRN IV ITCHING;  Start 4/5/20 at 

12:30;  Stop 4/6/20 at 12:29


Diphenhydramine HCl (Benadryl) 25 mg 1X PRN  PRN IV ITCHING;  Start 4/5/20 at 

12:30;  Stop 4/6/20 at 12:29


Info (PHARMACY MONITORING -- do not chart) 1 each PRN DAILY  PRN MC SEE 

COMMENTS;  Start 4/5/20 at 12:30





Active Scripts


Active


Reported


Bisoprolol Fumarate 5 Mg Tablet 10 Mg PO DAILY


Vitals/I & O





Vital Sign - Last 24 Hours








 4/5/20 4/5/20 4/5/20 4/5/20





 09:00 10:00 11:00 11:35


 


Pulse 80 80 80 


 


Resp 25 17 18 


 


B/P (MAP) 103/51 (68) 93/57 (69) 95/57 (70) 


 


Pulse Ox 99 99 100 100


 


O2 Delivery Ventilator Ventilator Ventilator Ventilator





 4/5/20 4/5/20 4/5/20 4/5/20





 12:00 12:00 13:56 14:33


 


Temp  99.4  





  99.4  


 


Pulse  81  


 


Resp  17  25


 


B/P (MAP)  103/55 (71)  


 


Pulse Ox  100 100 100


 


O2 Delivery Mechanical Ventilator Ventilator Ventilator Ventilator


 


    





    





 4/5/20 4/5/20 4/5/20 4/5/20





 15:25 18:00 18:10 19:00


 


Temp  98.4  





  98.4  


 


Pulse  85  84


 


Resp  23  18


 


B/P (MAP)  141/72 (95)  155/97 (116)


 


Pulse Ox 100 99 100 97


 


O2 Delivery Ventilator Ventilator Ventilator Ventilator


 


    





    





 4/5/20 4/5/20 4/5/20 4/5/20





 20:00 20:00 20:26 21:00


 


Temp 98.7   





 98.7   


 


Pulse 82   86


 


Resp 18   18


 


B/P (MAP) 124/67 (86)   


 


Pulse Ox 98  100 98


 


O2 Delivery Ventilator Mechanical Ventilator Ventilator Ventilator


 


    





    





 4/5/20 4/5/20 4/5/20 4/5/20





 21:25 22:00 22:09 23:00


 


Pulse  87  85


 


Resp 17 18 24 18


 


B/P (MAP)    124/83 (97)


 


Pulse Ox 100 100 98 99


 


O2 Delivery Ventilator Ventilator Ventilator Ventilator





 4/5/20 4/6/20 4/6/20 4/6/20





 23:27 00:00 00:00 01:00


 


Temp   99.2 





   99.2 


 


Pulse   83 79


 


Resp   18 18


 


B/P (MAP)   111/71 (84) 95/61 (72)


 


Pulse Ox 100  100 100


 


O2 Delivery Ventilator Mechanical Ventilator Ventilator Ventilator


 


    





    





 4/6/20 4/6/20 4/6/20 4/6/20





 02:00 02:02 03:00 04:00


 


Pulse 79  79 


 


Resp 18  18 


 


B/P (MAP) 111/64 (80)  91/58 (69) 


 


Pulse Ox 100 100 99 


 


O2 Delivery Ventilator Ventilator Ventilator Mechanical Ventilator





 4/6/20 4/6/20 4/6/20 4/6/20





 04:00 04:45 05:00 05:40


 


Temp 98.4   





 98.4   


 


Pulse 79  79 


 


Resp 18  18 18


 


B/P (MAP) 88/54 (65)  96/53 (67) 


 


Pulse Ox 100 100 100 100


 


O2 Delivery Ventilator Ventilator Ventilator Ventilator


 


    





    





 4/6/20 4/6/20 4/6/20 4/6/20





 06:00 06:26 07:00 07:39


 


Temp   97.7 





   97.7 


 


Pulse 78  77 


 


Resp 18 18 18 


 


B/P (MAP) 115/72 (86)  104/65 (78) 


 


Pulse Ox 98 99 100 100


 


O2 Delivery Ventilator Ventilator Ventilator Ventilator


 


    





    





 4/6/20   





 07:48   


 


O2 Delivery Mechanical Ventilator   














Intake and Output   


 


 4/5/20 4/5/20 4/6/20





 15:00 23:00 07:00


 


Intake Total 90 ml 479 ml 1363.2 ml


 


Output Total 100 ml 495 ml 92 ml


 


Balance -10 ml -16 ml 1271.2 ml











Hemodynamically unstable?:  No


Is patient in severe pain?:  No


Is NPO status required?:  Yes











HIRAM BARRERA MD              Apr 6, 2020 08:24

## 2020-04-06 NOTE — PDOC
PROGRESS NOTES


Assessment


Assessment


Respiratory failure.


Seizure.


Metabolic encephalopathy.


Fever 102.7 degree.


Metabolic acidosis.


Diffuse pulmonary infiltrate.


Pleural effusion.


Pancreatitis


Gallstone.


Leukocytosis.


Electrolytes imbalances.


Hyperglycemia.


DM.


HTN.


HLD.


Anemia.


Obesity.





RECOMMENDATIONS/PLAN:


Continue life support in PACU at the present time.


Keppra if has further seizures.


Treat medical diseases.





EEG last week: No seizure activity.





OBJECTIVE:


4/6/20: No seizures reported over night.





Past Medical History


Cardiovascular:  HTN, Hyperlipidemia


Endocrine:  Diabetes





Family History


Unobtainable.





Social HistoryU


not obtainable





Allergies


Coded Allergies:  


Codeine (Verified  Allergy, Intermediate, rash, 3/16/20)





ROS


Unobtainable in unresponsive state.





PHYSICAL EXAMINATION:   


General appearance in sub acute distress.  


HEENT:  Normocephalic and nontraumatic.  Eyes, nose, ears, and throat are 

unremarkable.


Neck is supple. No lymphadenopathy. 


Cardiovascular:  S1, S2.  


Pulmonary:  On vent.. 


Abdomen:  Bowel sounds are weak.  


Extremities:  No rash, lesions. Edema noted in hands.





NEUROLOGICAL  EXAMINATION:


Minimal responsiveness.


On vent via Trach.


Not oriented to time, place and person.


PERRL.


EOMI not elicited,


CN: no acute focal findings.


Muscle tone: Decreased.


Muscle strength: No movements to stimuli.


DTR: 1


Plantar reflex: No response bilaterally 


Gait: not able to walk.


Sensory exam: no response to stimuli..


Not able to access cerebellar signs.


F-T-N test not performed due to unresponsiveness





Objective


Objective





Vital Signs








  Date Time  Temp Pulse Resp B/P (MAP) Pulse Ox O2 Delivery O2 Flow Rate FiO2


 


4/6/20 13:40   19  96 Ventilator  


 


4/6/20 11:00 97.9 76  103/65 (78)    





 97.9       














Intake and Output 


 


 4/6/20





 07:00


 


Intake Total 2032.2 ml


 


Output Total 687 ml


 


Balance 1345.2 ml


 


 


 


Intake Oral 0 ml


 


IV Total 2032.2 ml


 


Output Urine Total 287 ml


 


Gastric Drainage Total 400 ml











Vitals Signs


Vitals





                          VS - Last 72 Hours, by Label








  Date Time  Temp Pulse Resp B/P (MAP) Pulse Ox O2 Delivery O2 Flow Rate FiO2


 


4/6/20 13:40   19  96 Ventilator  


 


4/6/20 12:30     100 Ventilator  


 


4/6/20 11:00 97.9 76 18 103/65 (78) 99 Ventilator  





 97.9       


 


4/6/20 10:00  76 18 93/67 (76) 99 Ventilator  


 


4/6/20 09:00  77 18 95/72 (80) 99 Ventilator  


 


4/6/20 08:00  76 18 100/58 (72) 99 Ventilator  


 


4/6/20 07:48      Mechanical Ventilator  


 


4/6/20 07:39     100 Ventilator  


 


4/6/20 07:00 97.7 77 18 104/65 (78) 100 Ventilator  





 97.7       


 


4/6/20 06:26   18  99 Ventilator  


 


4/6/20 06:00  78 18 115/72 (86) 98 Ventilator  


 


4/6/20 05:40   18  100 Ventilator  


 


4/6/20 05:00  79 18 96/53 (67) 100 Ventilator  


 


4/6/20 04:45     100 Ventilator  


 


4/6/20 04:00 98.4 79 18 88/54 (65) 100 Ventilator  





 98.4       


 


4/6/20 04:00      Mechanical Ventilator  


 


4/6/20 03:00  79 18 91/58 (69) 99 Ventilator  


 


4/6/20 02:02     100 Ventilator  


 


4/6/20 02:00  79 18 111/64 (80) 100 Ventilator  


 


4/6/20 01:00  79 18 95/61 (72) 100 Ventilator  


 


4/6/20 00:00 99.2 83 18 111/71 (84) 100 Ventilator  





 99.2       


 


4/6/20 00:00      Mechanical Ventilator  


 


4/5/20 23:27     100 Ventilator  


 


4/5/20 23:00  85 18 124/83 (97) 99 Ventilator  


 


4/5/20 22:09   24  98 Ventilator  


 


4/5/20 22:00  87 18  100 Ventilator  


 


4/5/20 21:25   17  100 Ventilator  


 


4/5/20 21:00  86 18  98 Ventilator  


 


4/5/20 20:26     100 Ventilator  


 


4/5/20 20:00      Mechanical Ventilator  


 


4/5/20 20:00 98.7 82 18 124/67 (86) 98 Ventilator  





 98.7       


 


4/5/20 19:00  84 18 155/97 (116) 97 Ventilator  


 


4/5/20 18:10     100 Ventilator  


 


4/5/20 18:00 98.4 85 23 141/72 (95) 99 Ventilator  





 98.4       


 


4/5/20 15:25     100 Ventilator  


 


4/5/20 14:33   25  100 Ventilator  


 


4/5/20 13:56     100 Ventilator  


 


4/5/20 12:00 99.4 81 17 103/55 (71) 100 Ventilator  





 99.4       


 


4/5/20 12:00      Mechanical Ventilator  


 


4/5/20 11:35     100 Ventilator  


 


4/5/20 11:00  80 18 95/57 (70) 100 Ventilator  


 


4/5/20 10:00  80 17 93/57 (69) 99 Ventilator  


 


4/5/20 09:00  80 25 103/51 (68) 99 Ventilator  


 


4/5/20 08:00 99.5 79 18 102/59 (73) 99 Ventilator  





 99.5       


 


4/5/20 08:00      Mechanical Ventilator  


 


4/5/20 08:00     99 Ventilator  


 


4/5/20 07:00  78 18 102/54 (70) 99 Ventilator  











Laboratory


Laboratory





Laboratory Tests








Test


 4/5/20


18:09 4/5/20


23:55 4/6/20


05:26 4/6/20


05:45


 


Glucose (Fingerstick)


 133 mg/dL


(70-99) 158 mg/dL


(70-99) 157 mg/dL


(70-99) 





 


Hemoglobin


 


 


 


 7.8 g/dL


(12.0-15.5)


 


Sodium Level


 


 


 


 139 mmol/L


(136-145)


 


Potassium Level


 


 


 


 4.2 mmol/L


(3.5-5.1)


 


Chloride Level


 


 


 


 103 mmol/L


()


 


Carbon Dioxide Level


 


 


 


 25 mmol/L


(21-32)


 


Anion Gap    11 (6-14) 


 


Blood Urea Nitrogen


 


 


 


 48 mg/dL


(7-20)


 


Creatinine


 


 


 


 1.8 mg/dL


(0.6-1.0)


 


Estimated GFR


(Cockcroft-Gault) 


 


 


 29.9 





 


Glucose Level


 


 


 


 167 mg/dL


(70-99)


 


Calcium Level


 


 


 


 8.6 mg/dL


(8.5-10.1)


 


Phosphorus Level


 


 


 


 3.9 mg/dL


(2.6-4.7)


 


Magnesium Level


 


 


 


 1.8 mg/dL


(1.8-2.4)


 


Albumin


 


 


 


 2.6 g/dL


(3.4-5.0)


 


Test


 4/6/20


07:40 4/6/20


11:21 


 





 


O2 Saturation 97 % (92-99)    


 


Arterial Blood pH


 7.42


(7.35-7.45) 


 


 





 


Arterial Blood pCO2 at


Patient Temp 37 mmHg


(35-46) 


 


 





 


Arterial Blood pO2 at Patient


Temp 105 mmHg


() 


 


 





 


Arterial Blood HCO3


 24 mmol/L


(21-28) 


 


 





 


Arterial Blood Base Excess


 -1 mmol/L


(-3-3) 


 


 





 


FiO2 40    


 


Glucose (Fingerstick)


 


 169 mg/dL


(70-99) 


 











Microbiology


4/5/20 Blood Culture - Preliminary, Resulted


         NO GROWTH AFTER 1 DAY





Medication


Medications





Current Medications


Albumin Human 200 ml @  200 mls/hr 1X PRN  PRN IV Hypotension;  Start 4/6/20 at 

14:00;  Stop 4/6/20 at 19:59


Bupivacaine HCl/ Epinephrine Bitart (Sensorcain-Epi 0.5%-1:846275 Mpf) 30 ml 

STK-MED ONCE .ROUTE  Last administered on 4/6/20at 11:44;  Start 4/6/20 at 

11:00;  Stop 4/6/20 at 11:01;  Status DC


Cellulose (Surgicel Fibrillar 1x2) 1 each STK-MED ONCE .ROUTE ;  Start 4/6/20 at

11:00;  Stop 4/6/20 at 11:01;  Status DC


Cellulose (Surgicel Hemostat 4x8) 1 each STK-MED ONCE .ROUTE  Last administered 

on 4/6/20at 11:44;  Start 4/6/20 at 11:55;  Stop 4/6/20 at 11:56;  Status DC


Diphenhydramine HCl (Benadryl) 25 mg 1X PRN  PRN IV ITCHING;  Start 4/6/20 at 

14:00;  Stop 4/7/20 at 13:59


Diphenhydramine HCl (Benadryl) 25 mg 1X PRN  PRN IV ITCHING;  Start 4/6/20 at 

14:00;  Stop 4/7/20 at 13:59


Fentanyl Citrate (Fentanyl 2ml Vial) 25 mcg PRN Q5MIN  PRN IV MILD PAIN 1-3;  

Start 4/6/20 at 07:00;  Stop 4/7/20 at 06:59


Fentanyl Citrate (Fentanyl 2ml Vial) 50 mcg PRN Q5MIN  PRN IV MODERATE TO SEVERE

PAIN;  Start 4/6/20 at 07:00;  Stop 4/7/20 at 06:59


Info (PHARMACY MONITORING -- do not chart) 1 each PRN DAILY  PRN MC SEE 

COMMENTS;  Start 4/6/20 at 14:00


Lidocaine HCl (Xylocaine-Mpf 1% 2ml Vial) 2 ml PRN 1X  PRN ID PRIOR TO IV START;

 Start 4/6/20 at 07:00;  Stop 4/7/20 at 06:59


Ondansetron HCl (Zofran) 4 mg PRN Q6HRS  PRN IV NAUSEA/VOMITING;  Start 4/6/20 

at 07:00;  Stop 4/7/20 at 06:59


Prochlorperazine Edisylate (Compazine) 5 mg PACU PRN  PRN IV NAUSEA, MRX1;  

Start 4/6/20 at 07:00;  Stop 4/7/20 at 06:59


Propofol 20 ml @ As Directed STK-MED ONCE IV ;  Start 4/6/20 at 11:07;  Stop 

4/6/20 at 11:07;  Status DC


Ringer's Solution 1,000 ml @  30 mls/hr Q24H IV ;  Start 4/6/20 at 07:00;  Stop 

4/6/20 at 18:59


Sevoflurane (Ultane) 60 ml STK-MED ONCE IH ;  Start 4/6/20 at 12:46;  Stop 

4/6/20 at 12:46;  Status DC


Sodium Chloride 1,000 ml @  400 mls/hr Q2H30M PRN IV PATENCY;  Start 4/6/20 at 

13:51;  Stop 4/7/20 at 01:50


Sodium Chloride 1,000 ml @  1,000 mls/hr Q1H PRN IV hypotension;  Start 4/6/20 

at 13:51;  Stop 4/6/20 at 19:50


Sodium Chloride 90 meq/Potassium Chloride 15 meq/ Potassium Phosphate 10 mmol/ 

Magnesium Sulfate 8 meq/Calcium Gluconate 15 meq/ Multivitamins 10 ml/Chromium/ 

Copper/Manganese/ Seleni/Zn 0.5 ml/ Insulin Human Regular 25 unit/ Total 

Parenteral Nutrition/Amino Acids/Dextrose/ Fat Emulsion Intravenous 1,400 ml @  

58.333 mls/ hr TPN  CONT IV  Last administered on 4/5/20at 21:20;  Start 4/5/20 

at 22:00;  Stop 4/6/20 at 21:59


Sodium Chloride 90 meq/Potassium Chloride 15 meq/ Potassium Phosphate 10 mmol/ 

Magnesium Sulfate 12 meq/Calcium Gluconate 15 meq/ Multivitamins 10 ml/Chromium/

Copper/Manganese/ Seleni/Zn 0.5 ml/ Insulin Human Regular 25 unit/ Total 

Parenteral Nutrition/Amino Acids/Dextrose/ Fat Emulsion Intravenous 1,400 ml @  

58.333 mls/ hr TPN  CONT IV ;  Start 4/6/20 at 22:00;  Stop 4/7/20 at 21:59





Comment


Review of Relevant


I have reviewed the following items josy (where applicable) has been applied.











DORYS LANDA MD                  Apr 6, 2020 14:44

## 2020-04-06 NOTE — PDOC
SUBJECTIVE


ROS


s/p Trach this am





OBJECTIVE


Vital Signs





Vital Signs








  Date Time  Temp Pulse Resp B/P (MAP) Pulse Ox O2 Delivery O2 Flow Rate FiO2


 


4/6/20 09:00  77 18 95/72 (80) 99 Ventilator  


 


4/6/20 07:00 97.7       





 97.7       








I & 0











Intake and Output 


 


 4/6/20





 07:00


 


Intake Total 2032.2 ml


 


Output Total 687 ml


 


Balance 1345.2 ml


 


 


 


Intake Oral 0 ml


 


IV Total 2032.2 ml


 


Output Urine Total 287 ml


 


Gastric Drainage Total 400 ml











PHYSICAL EXAM


Physical Exam


GENERAL: On MV  


HEENT om moist  


NECK: Trach +  


LUNGS: Diminished aeration bases 


HEART:  S1, S2, regular 


ABDOMEN:  Distended, hypoactive BS, Rectal tube in place 


: [place]   


EXTREMITIES: Generalized edema,  some mottling


DERMATOLOGIC:   No generalized rash.  


CENTRAL NERVOUS SYSTEM: Sedated 


RIJ Temp HDC





DIAGNOSIS/ASSESSMENT


Assessment & Plan





ARF-- ATN,    Off CRRT 


Currently daily HD mainly for UF  attempt UF 3-4 as tolerated  


Treatment plan reviewed and discussed with Dialysis RN





Anemia- per primary  


Currently on YOLI 





Anasarca due to 3rd spacing 





Hyperkalemia- resolved  





AC Panreatitis, early developing necrosis





Cholelithiasis





Acute hypoxic resp failure ,bilateral pleural effusion


On MV  





hypocalcemia - stable 





HTN- Hypotensive , pressors as indicated





COMMENT/RELEVANT DATA


Meds





Current Medications








 Medications


  (Trade)  Dose


 Ordered  Sig/Yee  Start Time


 Stop Time Status Last Admin


Dose Admin


 


 Acetaminophen


  (Tylenol Supp)  650 mg  PRN Q6HRS  PRN  3/24/20 10:30


    3/24/20 10:37


650 MG


 


 Acetaminophen


  (Tylenol)  650 mg  PRN Q6HRS  PRN  3/21/20 03:36


    3/26/20 07:35


650 MG


 


 Albumin Human  200 ml @ 


 200 mls/hr  1X  ONCE  4/5/20 12:30


 4/5/20 13:29 DC 4/5/20 13:34


200 MLS/HR


 


 Albuterol Sulfate


  (Ventolin Neb


 Soln)  2.5 mg  1X  ONCE  3/17/20 22:30


 3/17/20 22:31 DC 3/18/20 00:56


2.5 MG


 


 Alteplase,


 Recombinant


  (Cathflo For


 Central Catheter


 Clearance)  1 mg  1X  ONCE  3/31/20 22:00


 3/31/20 22:01 DC 3/31/20 22:05


1 MG


 


 Artificial Tears


  (Artificial


 Tears)  1 drop  PRN Q1HR  PRN  3/23/20 08:15


     





 


 Atenolol


  (Tenormin)  100 mg  DAILY  3/17/20 09:00


 3/16/20 20:08 DC  





 


 Atropine Sulfate


  (ATROPINE 0.5mg


 SYRINGE)  0.5 mg  PRN Q5MIN  PRN  4/2/20 08:15


     





 


 Benzocaine


  (Hurricaine One)  1 spray  1X  ONCE  3/20/20 14:30


 3/20/20 14:31 DC 3/20/20 16:38


1 SPRAY


 


 Calcium Carbonate/


 Glycine


  (Tums)  500 mg  PRN [id number]  PRN  3/18/20 17:45


     





 


 Calcium Chloride


 1000 mg/Sodium


 Chloride  110 ml @ 


 220 mls/hr  1X  ONCE  3/17/20 22:30


 3/17/20 22:59 DC 3/17/20 22:11


220 MLS/HR


 


 Calcium Chloride


 3000 mg/Sodium


 Chloride  1,030 ml @ 


 50 mls/hr  [id number]  3/19/20 08:00


 3/21/20 15:23 DC 3/21/20 02:17


50 MLS/HR


 


 Calcium Gluconate


  (Calcium


 Gluconate)  2,000 mg  1X  ONCE  3/19/20 02:15


 3/19/20 02:16 DC 3/19/20 02:19


2,000 MG


 


 Calcium Gluconate


 1000 mg/Sodium


 Chloride  110 ml @ 


 220 mls/hr  1X  ONCE  3/18/20 03:30


 3/18/20 03:59 DC 3/18/20 03:21


220 MLS/HR


 


 Calcium Gluconate


 2000 mg/Sodium


 Chloride  120 ml @ 


 220 mls/hr  1X  ONCE  3/18/20 07:30


 3/18/20 08:02 DC 3/18/20 09:05


220 MLS/HR


 


 Cefepime HCl


  (Maxipime)  2 gm  Q12HR  3/25/20 09:00


    4/6/20 08:36


2 GM


 


 Daptomycin 500 mg/


 Sodium Chloride  50 ml @ 


 100 mls/hr  Q48H  3/25/20 08:30


    4/6/20 08:35


100 MLS/HR


 


 Dexmedetomidine


 HCl 400 mcg/


 Sodium Chloride  100 ml @ 0


 mls/hr  CONT  PRN  4/2/20 08:15


    4/6/20 09:19


27.8 MLS/HR


 


 Dextrose


  (Dextrose


 50%-Water Syringe)  12.5 gm  PRN Q15MIN  PRN  3/16/20 09:30


     





 


 Digoxin


  (Lanoxin)  125 mcg  1X  ONCE  3/19/20 18:00


 3/19/20 18:01 DC 3/19/20 17:10


125 MCG


 


 Diphenhydramine


 HCl


  (Benadryl)  25 mg  1X PRN  PRN  4/5/20 12:30


 4/6/20 12:29   





 


 Etomidate


  (Amidate)  8 mg  1X  ONCE  3/23/20 08:30


 3/23/20 08:31 DC 3/23/20 08:33


8 MG


 


 Fentanyl Citrate


  (Fentanyl 2ml


 Vial)  50 mcg  PRN Q5MIN  PRN  4/6/20 07:00


 4/7/20 06:59   





 


 Furosemide


  (Lasix)  20 mg  1X  ONCE  4/2/20 08:15


 4/2/20 08:16 DC 4/2/20 08:19


20 MG


 


 Heparin Sodium


  (Porcine)


  (Heparin Sodium)  5,000 unit  Q12HR  4/3/20 21:00


    4/5/20 21:21


5,000 UNIT


 


 Hydromorphone HCl


  (Dilaudid)  1 mg  PRN Q3HRS  PRN  3/17/20 12:00


 3/31/20 00:25 DC 3/23/20 05:13


1 MG


 


 Info


  (CONTRAST GIVEN


 -- Rx MONITORING)  1 each  PRN DAILY  PRN  3/30/20 11:45


 4/1/20 11:44 DC  





 


 Info


  (Icu Electrolyte


 Protocol)  1 ea  CONT PRN  PRN  3/29/20 13:15


     





 


 Info


  (PHARMACY


 MONITORING -- do


 not chart)  1 each  PRN DAILY  PRN  4/5/20 12:30


     





 


 Info


  (Tpn Per


 Pharmacy)  1 each  PRN DAILY  PRN  3/18/20 12:30


   UNV  





 


 Insulin Human


 Lispro


  (HumaLOG)  0-9 UNITS  Q6HRS  3/16/20 09:30


    4/6/20 06:18


4 UNITS


 


 Insulin Human


 Regular


  (HumuLIN R VIAL)  5 unit  1X  ONCE  3/17/20 22:30


 3/17/20 22:31 DC 3/17/20 22:14


5 UNIT


 


 Iohexol


  (Omnipaque 240


 Mg/ml)  30 ml  1X  ONCE  3/30/20 11:30


 3/30/20 11:33 DC 3/30/20 11:30


30 ML


 


 Iohexol


  (Omnipaque 300


 Mg/ml)  60 ml  1X  ONCE  3/17/20 17:00


 3/17/20 17:01 DC 3/17/20 17:20


60 ML


 


 Iohexol


  (Omnipaque 350


 Mg/ml)  90 ml  1X  ONCE  3/16/20 03:30


 3/16/20 03:31 DC 3/16/20 03:25


90 ML


 


 Ketorolac


 Tromethamine


  (Toradol 30mg


 Vial)  30 mg  1X  ONCE  3/16/20 03:00


 3/16/20 03:01 DC 3/16/20 02:54


30 MG


 


 Lidocaine HCl


  (Buffered


 Lidocaine 1%)  6 ml  1X  ONCE  4/2/20 09:00


 4/2/20 09:06 DC 4/2/20 09:05


6 ML


 


 Lidocaine HCl


  (Glydo


  (Lidocaine) Jelly)  1 thomas  1X  ONCE  3/20/20 14:30


 3/20/20 14:31 DC 3/20/20 16:38


1 THOMAS


 


 Lidocaine HCl


  (Xylocaine-Mpf


 1% 2ml Vial)  2 ml  PRN 1X  PRN  4/6/20 07:00


 4/7/20 06:59   





 


 Linezolid/Dextrose  300 ml @ 


 300 mls/hr  Q12HR  3/20/20 20:00


 3/27/20 07:50 DC 3/26/20 21:04


300 MLS/HR


 


 Lorazepam


  (Ativan Inj)  1 mg  PRN Q4HRS  PRN  3/19/20 09:00


    3/23/20 00:34


1 MG


 


 Magnesium Sulfate  50 ml @ 25


 mls/hr  PRN DAILY  PRN  4/5/20 09:15


     





 


 Meropenem 1 gm/


 Sodium Chloride  100 ml @ 


 200 mls/hr  Q8HRS  3/17/20 20:00


 3/18/20 08:48 DC 3/18/20 05:45


200 MLS/HR


 


 Meropenem 500 mg/


 Sodium Chloride  50 ml @ 


 100 mls/hr  Q6HRS  3/24/20 09:00


 3/25/20 07:29 DC 3/25/20 06:00


100 MLS/HR


 


 Metoprolol


 Tartrate


  (Lopressor Vial)  5 mg  Q6HRS  3/17/20 10:15


 3/28/20 08:48 DC 3/26/20 00:12


5 MG


 


 Metronidazole  100 ml @ 


 100 mls/hr  Q6HRS  3/23/20 08:30


    4/6/20 06:16


100 MLS/HR


 


 Micafungin Sodium


 100 mg/Dextrose  100 ml @ 


 100 mls/hr  Q24H  3/23/20 09:00


    4/6/20 08:36


100 MLS/HR


 


 Midazolam HCl


  (Versed)  5 mg  1X  ONCE  3/23/20 08:30


 3/23/20 08:31 DC  





 


 Midazolam HCl 100


 mg/Sodium Chloride  100 ml @ 7


 mls/hr  CONT  PRN  3/28/20 16:00


    4/6/20 00:40


7 MLS/HR


 


 Midazolam HCl 50


 mg/Sodium Chloride  50 ml @ 0


 mls/hr  CONT  PRN  3/23/20 08:15


 3/28/20 15:59 DC 3/26/20 22:39


7 MLS/HR


 


 Morphine Sulfate


  (Morphine


 Sulfate)  2 mg  PRN Q2HR  PRN  3/16/20 05:00


 3/17/20 14:15 DC 3/17/20 12:26


2 MG


 


 Multi-Ingred


 Cream/Lotion/Oil/


 Oint


  (Artificial


 Tears Eye


 Ointment)  1 thomas  PRN Q1HR  PRN  3/25/20 17:30


    4/3/20 11:05


1 THOMAS


 


 Norepinephrine


 Bitartrate 8 mg/


 Dextrose  258 ml @ 


 17.299 mls/


 hr  CONT  PRN  3/17/20 15:30


    4/5/20 21:18


1.73 MLS/HR


 


 Ondansetron HCl


  (Zofran)  4 mg  PRN Q6HRS  PRN  4/6/20 07:00


 4/7/20 06:59   





 


 Pantoprazole


 Sodium


  (PROTONIX VIAL


 for IV PUSH)  40 mg  DAILYAC  3/16/20 11:30


    4/6/20 08:34


40 MG


 


 Piperacillin Sod/


 Tazobactam Sod


 4.5 gm/Sodium


 Chloride  100 ml @ 


 200 mls/hr  1X  ONCE  3/16/20 06:00


 3/16/20 06:29 DC 3/16/20 05:44


200 MLS/HR


 


 Potassium


 Chloride 15 meq/


 Bicarbonate


 Dialysis Soln w/


 out KCl  5,007.5 ml


  @ 1,000 mls/


 hr  Q5H1M  3/29/20 20:00


 4/2/20 13:08 DC 4/1/20 18:14


1,000 MLS/HR


 


 Potassium


 Chloride 20 meq/


 Bicarbonate


 Dialysis Soln w/


 out KCl  5,010 ml @ 


 1,000 mls/hr  Q5H1M  3/25/20 16:00


 3/29/20 19:59 DC 3/29/20 14:54


1,000 MLS/HR


 


 Potassium


 Chloride/Water  100 ml @ 


 100 mls/hr  Q1H  3/24/20 11:00


 3/24/20 12:59 DC 3/24/20 12:12


100 MLS/HR


 


 Potassium


 Phosphate 20 mmol/


 Sodium Chloride  106.6667


 ml @ 


 51.667 m...  1X  ONCE  3/25/20 13:00


 3/25/20 15:03 DC 3/25/20 12:51


51.667 MLS/HR


 


 Prochlorperazine


 Edisylate


  (Compazine)  5 mg  PACU PRN  PRN  4/6/20 07:00


 4/7/20 06:59   





 


 Propofol  0 ml @ As


 Directed  STK-MED ONCE  3/23/20 07:53


 3/23/20 07:53 DC  





 


 Ringer's Solution  1,000 ml @ 


 30 mls/hr  Q24H  4/6/20 07:00


 4/6/20 18:59   





 


 Sodium


 Bicarbonate 50


 meq/Sodium


 Chloride  1,050 ml @ 


 75 mls/hr  Q14H  3/18/20 07:30


 3/23/20 10:28 DC 3/22/20 21:10


75 MLS/HR


 


 Sodium Chloride  1,000 ml @ 


 1,000 mls/hr  Q1H PRN  4/5/20 12:23


 4/5/20 18:22 DC  





 


 Sodium Chloride


  (Normal Saline


 Flush)  10 ml  1X PRN  PRN  4/3/20 07:30


 4/4/20 07:29 DC  





 


 Sodium Chloride


 90 meq/Calcium


 Gluconate 10 meq/


 Multivitamins 10


 ml/Chromium/


 Copper/Manganese/


 Seleni/Zn 0.5 ml/


 Total Parenteral


 Nutrition/Amino


 Acids/Dextrose/


 Fat Emulsion


 Intravenous  1,512 ml @ 


 63 mls/hr  TPN  CONT  3/18/20 22:00


 3/19/20 21:59 DC 3/18/20 22:06


63 MLS/HR


 


 Sodium Chloride


 90 meq/Calcium


 Gluconate 10 meq/


 Multivitamins 10


 ml/Chromium/


 Copper/Manganese/


 Seleni/Zn 1 ml/


 Total Parenteral


 Nutrition/Amino


 Acids/Dextrose/


 Fat Emulsion


 Intravenous  55.005 ml


  @ 2.292


 mls/hr  TPN  CONT  3/18/20 22:00


 3/18/20 12:33 DC  





 


 Sodium Chloride


 90 meq/Magnesium


 Sulfate 10 meq/


 Calcium Gluconate


 20 meq/


 Multivitamins 10


 ml/Chromium/


 Copper/Manganese/


 Seleni/Zn 0.5 ml/


 Total Parenteral


 Nutrition/Amino


 Acids/Dextrose/


 Fat Emulsion


 Intravenous  1,512 ml @ 


 63 mls/hr  TPN  CONT  3/19/20 22:00


 3/20/20 21:59 DC 3/19/20 22:25


63 MLS/HR


 


 Sodium Chloride


 90 meq/Potassium


 Chloride 15 meq/


 Potassium


 Phosphate 10 mmol/


 Magnesium Sulfate


 8 meq/Calcium


 Gluconate 15 meq/


 Multivitamins 10


 ml/Chromium/


 Copper/Manganese/


 Seleni/Zn 0.5 ml/


 Insulin Human


 Regular 25 unit/


 Total Parenteral


 Nutrition/Amino


 Acids/Dextrose/


 Fat Emulsion


 Intravenous  1,400 ml @ 


 58.333 mls/


 hr  TPN  CONT  4/5/20 22:00


 4/6/20 21:59  4/5/20 21:20


58.333 MLS/HR


 


 Sodium Chloride


 90 meq/Potassium


 Chloride 15 meq/


 Potassium


 Phosphate 10 mmol/


 Magnesium Sulfate


 10 meq/Calcium


 Gluconate 20 meq/


 Multivitamins 10


 ml/Chromium/


 Copper/Manganese/


 Seleni/Zn 0.5 ml/


 Total Parenteral


 Nutrition/Amino


 Acids/Dextrose/


 Fat Emulsion


 Intravenous  1,400 ml @ 


 58.333 mls/


 hr  TPN  CONT  3/23/20 22:00


 3/24/20 21:59 DC 3/23/20 21:42


58.333 MLS/HR


 


 Sodium Chloride


 90 meq/Potassium


 Chloride 15 meq/


 Potassium


 Phosphate 15 mmol/


 Magnesium Sulfate


 10 meq/Calcium


 Gluconate 15 meq/


 Multivitamins 10


 ml/Chromium/


 Copper/Manganese/


 Seleni/Zn 0.5 ml/


 Total Parenteral


 Nutrition/Amino


 Acids/Dextrose/


 Fat Emulsion


 Intravenous  1,400 ml @ 


 58.333 mls/


 hr  TPN  CONT  3/24/20 22:00


 3/25/20 21:59 DC 3/24/20 22:17


58.333 MLS/HR


 


 Sodium Chloride


 90 meq/Potassium


 Chloride 15 meq/


 Potassium


 Phosphate 15 mmol/


 Magnesium Sulfate


 10 meq/Calcium


 Gluconate 20 meq/


 Multivitamins 10


 ml/Chromium/


 Copper/Manganese/


 Seleni/Zn 0.5 ml/


 Total Parenteral


 Nutrition/Amino


 Acids/Dextrose/


 Fat Emulsion


 Intravenous  1,200 ml @ 


 50 mls/hr  TPN  CONT  3/22/20 22:00


 3/22/20 14:17 DC  





 


 Sodium Chloride


 90 meq/Potassium


 Chloride 15 meq/


 Potassium


 Phosphate 18 mmol/


 Magnesium Sulfate


 8 meq/Calcium


 Gluconate 15 meq/


 Multivitamins 10


 ml/Chromium/


 Copper/Manganese/


 Seleni/Zn 0.5 ml/


 Insulin Human


 Regular 10 unit/


 Total Parenteral


 Nutrition/Amino


 Acids/Dextrose/


 Fat Emulsion


 Intravenous  1,400 ml @ 


 58.333 mls/


 hr  TPN  CONT  3/27/20 22:00


 3/28/20 21:59 DC 3/27/20 21:43


58.333 MLS/HR


 


 Sodium Chloride


 90 meq/Potassium


 Chloride 15 meq/


 Potassium


 Phosphate 18 mmol/


 Magnesium Sulfate


 8 meq/Calcium


 Gluconate 15 meq/


 Multivitamins 10


 ml/Chromium/


 Copper/Manganese/


 Seleni/Zn 0.5 ml/


 Insulin Human


 Regular 15 unit/


 Total Parenteral


 Nutrition/Amino


 Acids/Dextrose/


 Fat Emulsion


 Intravenous  1,400 ml @ 


 58.333 mls/


 hr  TPN  CONT  3/30/20 22:00


 3/31/20 21:59 DC 3/30/20 21:47


58.333 MLS/HR


 


 Sodium Chloride


 90 meq/Potassium


 Chloride 15 meq/


 Potassium


 Phosphate 18 mmol/


 Magnesium Sulfate


 8 meq/Calcium


 Gluconate 15 meq/


 Multivitamins 10


 ml/Chromium/


 Copper/Manganese/


 Seleni/Zn 0.5 ml/


 Insulin Human


 Regular 20 unit/


 Total Parenteral


 Nutrition/Amino


 Acids/Dextrose/


 Fat Emulsion


 Intravenous  1,400 ml @ 


 58.333 mls/


 hr  TPN  CONT  4/2/20 22:00


 4/3/20 21:59 DC 4/2/20 22:45


58.333 MLS/HR


 


 Sodium Chloride


 90 meq/Potassium


 Chloride 15 meq/


 Potassium


 Phosphate 18 mmol/


 Magnesium Sulfate


 8 meq/Calcium


 Gluconate 15 meq/


 Multivitamins 10


 ml/Chromium/


 Copper/Manganese/


 Seleni/Zn 0.5 ml/


 Total Parenteral


 Nutrition/Amino


 Acids/Dextrose/


 Fat Emulsion


 Intravenous  1,400 ml @ 


 58.333 mls/


 hr  TPN  CONT  3/26/20 22:00


 3/27/20 21:59 DC 3/26/20 22:00


58.333 MLS/HR


 


 Succinylcholine


 Chloride


  (Anectine)  120 mg  1X  ONCE  3/23/20 08:30


 3/23/20 08:31 DC 3/23/20 08:34


120 MG








Lab





Laboratory Tests








Test


 4/5/20


11:44 4/5/20


18:09 4/5/20


23:55 4/6/20


05:26


 


Glucose (Fingerstick)


 162 mg/dL


(70-99) 133 mg/dL


(70-99) 158 mg/dL


(70-99) 157 mg/dL


(70-99)


 


Test


 4/6/20


05:45 4/6/20


07:40 


 





 


Hemoglobin


 7.8 g/dL


(12.0-15.5) 


 


 





 


Sodium Level


 139 mmol/L


(136-145) 


 


 





 


Potassium Level


 4.2 mmol/L


(3.5-5.1) 


 


 





 


Chloride Level


 103 mmol/L


() 


 


 





 


Carbon Dioxide Level


 25 mmol/L


(21-32) 


 


 





 


Anion Gap 11 (6-14)    


 


Blood Urea Nitrogen


 48 mg/dL


(7-20) 


 


 





 


Creatinine


 1.8 mg/dL


(0.6-1.0) 


 


 





 


Estimated GFR


(Cockcroft-Gault) 29.9 


 


 


 





 


Glucose Level


 167 mg/dL


(70-99) 


 


 





 


Calcium Level


 8.6 mg/dL


(8.5-10.1) 


 


 





 


Phosphorus Level


 3.9 mg/dL


(2.6-4.7) 


 


 





 


Magnesium Level


 1.8 mg/dL


(1.8-2.4) 


 


 





 


Albumin


 2.6 g/dL


(3.4-5.0) 


 


 





 


O2 Saturation  97 % (92-99)   


 


Arterial Blood pH


 


 7.42


(7.35-7.45) 


 





 


Arterial Blood pCO2 at


Patient Temp 


 37 mmHg


(35-46) 


 





 


Arterial Blood pO2 at Patient


Temp 


 105 mmHg


() 


 





 


Arterial Blood HCO3


 


 24 mmol/L


(21-28) 


 





 


Arterial Blood Base Excess


 


 -1 mmol/L


(-3-3) 


 





 


FiO2  40   








Results


All relevant outside records, renal labs, imaging studies, telemetry/EKG's were 

reviewed.


Other


1. Increased moderate to large bilateral posteriorly layering pleural 


effusions with associated atelectasis.











KIMBERLY MYERS MD                 Apr 6, 2020 10:01

## 2020-04-06 NOTE — PDOC
SURGICAL PROGRESS NOTE


Subjective


Pre-Op Note


48 yo F with severe pancreatitis and associated respiratory failure.


TO OR for tracheostomy placement.


R/R/B/A d/w pt's daughter previously.


Vital Signs





Vital Signs








  Date Time  Temp Pulse Resp B/P (MAP) Pulse Ox O2 Delivery O2 Flow Rate FiO2


 


4/6/20 11:00 97.9 76 18 103/65 (78) 99 Ventilator  





 97.9       








I&O











Intake and Output 


 


 4/6/20





 07:00


 


Intake Total 2032.2 ml


 


Output Total 687 ml


 


Balance 1345.2 ml


 


 


 


Intake Oral 0 ml


 


IV Total 2032.2 ml


 


Output Urine Total 287 ml


 


Gastric Drainage Total 400 ml








Labs





Laboratory Tests








Test


 4/4/20


14:54 4/4/20


17:50 4/4/20


18:38 4/4/20


23:52


 


Urine Collection Type Unknown    


 


Urine Color Red    


 


Urine Clarity Cloudy    


 


Urine pH 5.0 (<5.0-8.0)    


 


Urine Specific Gravity


 1.025


(1.000-1.030) 


 


 





 


Urine Protein


 100 mg/dL


(NEG-TRACE) 


 


 





 


Urine Glucose (UA)


 Negative mg/dL


(NEG) 


 


 





 


Urine Ketones (Stick)


 Trace mg/dL


(NEG) 


 


 





 


Urine Blood Large (NEG)    


 


Urine Nitrite Positive (NEG)    


 


Urine Bilirubin Small (NEG)    


 


Urine Urobilinogen Dipstick


 0.2 mg/dL (0.2


mg/dL) 


 


 





 


Urine Leukocyte Esterase Small (NEG)    


 


Urine RBC


 Rare /HPF


(0-2) 


 


 





 


Urine WBC 1-4 /HPF (0-4)    


 


Urine Squamous Epithelial


Cells Occ /LPF 


 


 


 





 


Urine Amorphous Sediment Present /HPF    


 


Urine Bacteria 0 /HPF (0-FEW)    


 


O2 Saturation  98 % (92-99)   


 


Arterial Blood pH


 


 7.48


(7.35-7.45) 


 





 


Arterial Blood pCO2 at


Patient Temp 


 30 mmHg


(35-46) 


 





 


Arterial Blood pO2 at Patient


Temp 


 122 mmHg


() 


 





 


Arterial Blood HCO3


 


 22 mmol/L


(21-28) 


 





 


Arterial Blood Base Excess


 


 -1 mmol/L


(-3-3) 


 





 


FiO2  50   


 


Glucose (Fingerstick)


 


 


 171 mg/dL


(70-99) 173 mg/dL


(70-99)


 


Test


 4/5/20


06:00 4/5/20


06:17 4/5/20


08:00 4/5/20


11:44


 


White Blood Count


 9.6 x10^3/uL


(4.0-11.0) 


 


 





 


Red Blood Count


 2.42 x10^6/uL


(3.50-5.40) 


 


 





 


Hemoglobin


 7.8 g/dL


(12.0-15.5) 


 


 





 


Hematocrit


 23.8 %


(36.0-47.0) 


 


 





 


Mean Corpuscular Volume 98 fL ()    


 


Mean Corpuscular Hemoglobin 32 pg (25-35)    


 


Mean Corpuscular Hemoglobin


Concent 33 g/dL


(31-37) 


 


 





 


Red Cell Distribution Width


 19.5 %


(11.5-14.5) 


 


 





 


Platelet Count


 198 x10^3/uL


(140-400) 


 


 





 


Neutrophils (%) (Auto) 76 % (31-73)    


 


Lymphocytes (%) (Auto) 10 % (24-48)    


 


Monocytes (%) (Auto) 7 % (0-9)    


 


Eosinophils (%) (Auto) 7 % (0-3)    


 


Basophils (%) (Auto) 1 % (0-3)    


 


Neutrophils # (Auto)


 7.3 x10^3/uL


(1.8-7.7) 


 


 





 


Lymphocytes # (Auto)


 0.9 x10^3/uL


(1.0-4.8) 


 


 





 


Monocytes # (Auto)


 0.7 x10^3/uL


(0.0-1.1) 


 


 





 


Eosinophils # (Auto)


 0.6 x10^3/uL


(0.0-0.7) 


 


 





 


Basophils # (Auto)


 0.0 x10^3/uL


(0.0-0.2) 


 


 





 


Sodium Level


 139 mmol/L


(136-145) 


 


 





 


Potassium Level


 4.0 mmol/L


(3.5-5.1) 


 


 





 


Chloride Level


 104 mmol/L


() 


 


 





 


Carbon Dioxide Level


 26 mmol/L


(21-32) 


 


 





 


Anion Gap 9 (6-14)    


 


Blood Urea Nitrogen


 54 mg/dL


(7-20) 


 


 





 


Creatinine


 1.9 mg/dL


(0.6-1.0) 


 


 





 


Estimated GFR


(Cockcroft-Gault) 28.1 


 


 


 





 


BUN/Creatinine Ratio 28 (6-20)    


 


Glucose Level


 164 mg/dL


(70-99) 


 


 





 


Calcium Level


 8.3 mg/dL


(8.5-10.1) 


 


 





 


Total Bilirubin


 1.0 mg/dL


(0.2-1.0) 


 


 





 


Aspartate Amino Transf


(AST/SGOT) 47 U/L (15-37) 


 


 


 





 


Alanine Aminotransferase


(ALT/SGPT) 20 U/L (14-59) 


 


 


 





 


Alkaline Phosphatase


 81 U/L


() 


 


 





 


Total Protein


 5.0 g/dL


(6.4-8.2) 


 


 





 


Albumin


 2.3 g/dL


(3.4-5.0) 


 


 





 


Albumin/Globulin Ratio 0.9 (1.0-1.7)    


 


Glucose (Fingerstick)


 


 167 mg/dL


(70-99) 


 162 mg/dL


(70-99)


 


O2 Saturation   97 % (92-99)  


 


Arterial Blood pH


 


 


 7.42


(7.35-7.45) 





 


Arterial Blood pCO2 at


Patient Temp 


 


 35 mmHg


(35-46) 





 


Arterial Blood pO2 at Patient


Temp 


 


 104 mmHg


() 





 


Arterial Blood HCO3


 


 


 22 mmol/L


(21-28) 





 


Arterial Blood Base Excess


 


 


 -2 mmol/L


(-3-3) 





 


FiO2   45  


 


Test


 4/5/20


18:09 4/5/20


23:55 4/6/20


05:26 4/6/20


05:45


 


Glucose (Fingerstick)


 133 mg/dL


(70-99) 158 mg/dL


(70-99) 157 mg/dL


(70-99) 





 


Hemoglobin


 


 


 


 7.8 g/dL


(12.0-15.5)


 


Sodium Level


 


 


 


 139 mmol/L


(136-145)


 


Potassium Level


 


 


 


 4.2 mmol/L


(3.5-5.1)


 


Chloride Level


 


 


 


 103 mmol/L


()


 


Carbon Dioxide Level


 


 


 


 25 mmol/L


(21-32)


 


Anion Gap    11 (6-14) 


 


Blood Urea Nitrogen


 


 


 


 48 mg/dL


(7-20)


 


Creatinine


 


 


 


 1.8 mg/dL


(0.6-1.0)


 


Estimated GFR


(Cockcroft-Gault) 


 


 


 29.9 





 


Glucose Level


 


 


 


 167 mg/dL


(70-99)


 


Calcium Level


 


 


 


 8.6 mg/dL


(8.5-10.1)


 


Phosphorus Level


 


 


 


 3.9 mg/dL


(2.6-4.7)


 


Magnesium Level


 


 


 


 1.8 mg/dL


(1.8-2.4)


 


Albumin


 


 


 


 2.6 g/dL


(3.4-5.0)


 


Test


 4/6/20


07:40 4/6/20


11:21 


 





 


O2 Saturation 97 % (92-99)    


 


Arterial Blood pH


 7.42


(7.35-7.45) 


 


 





 


Arterial Blood pCO2 at


Patient Temp 37 mmHg


(35-46) 


 


 





 


Arterial Blood pO2 at Patient


Temp 105 mmHg


() 


 


 





 


Arterial Blood HCO3


 24 mmol/L


(21-28) 


 


 





 


Arterial Blood Base Excess


 -1 mmol/L


(-3-3) 


 


 





 


FiO2 40    


 


Glucose (Fingerstick)


 


 169 mg/dL


(70-99) 


 











Laboratory Tests








Test


 4/5/20


11:44 4/5/20


18:09 4/5/20


23:55 4/6/20


05:26


 


Glucose (Fingerstick)


 162 mg/dL


(70-99) 133 mg/dL


(70-99) 158 mg/dL


(70-99) 157 mg/dL


(70-99)


 


Test


 4/6/20


05:45 4/6/20


07:40 4/6/20


11:21 





 


Hemoglobin


 7.8 g/dL


(12.0-15.5) 


 


 





 


Sodium Level


 139 mmol/L


(136-145) 


 


 





 


Potassium Level


 4.2 mmol/L


(3.5-5.1) 


 


 





 


Chloride Level


 103 mmol/L


() 


 


 





 


Carbon Dioxide Level


 25 mmol/L


(21-32) 


 


 





 


Anion Gap 11 (6-14)    


 


Blood Urea Nitrogen


 48 mg/dL


(7-20) 


 


 





 


Creatinine


 1.8 mg/dL


(0.6-1.0) 


 


 





 


Estimated GFR


(Cockcroft-Gault) 29.9 


 


 


 





 


Glucose Level


 167 mg/dL


(70-99) 


 


 





 


Calcium Level


 8.6 mg/dL


(8.5-10.1) 


 


 





 


Phosphorus Level


 3.9 mg/dL


(2.6-4.7) 


 


 





 


Magnesium Level


 1.8 mg/dL


(1.8-2.4) 


 


 





 


Albumin


 2.6 g/dL


(3.4-5.0) 


 


 





 


O2 Saturation  97 % (92-99)   


 


Arterial Blood pH


 


 7.42


(7.35-7.45) 


 





 


Arterial Blood pCO2 at


Patient Temp 


 37 mmHg


(35-46) 


 





 


Arterial Blood pO2 at Patient


Temp 


 105 mmHg


() 


 





 


Arterial Blood HCO3


 


 24 mmol/L


(21-28) 


 





 


Arterial Blood Base Excess


 


 -1 mmol/L


(-3-3) 


 





 


FiO2  40   


 


Glucose (Fingerstick)


 


 


 169 mg/dL


(70-99) 











Problem List


Problems


Medical Problems:


(1) Acute pancreatitis


Status: Acute  





(2) Cholelithiasis


Status: Acute  

















GAMAL ZHOU MD              Apr 6, 2020 11:35

## 2020-04-06 NOTE — PDOC4
OPERATIVE NOTE


Date:


Date:  Apr 6, 2020





Pre-Op Diagnosis:


Respiratory failure





Post-Op Diagnosis:


same





Procedure Performed:


Tracheostomy placement (cuffed 6 shiley)





Surgeon:


Frandy Zhou





Anesthesia Type:


GETA plus local





Blood Loss:


minimal





Specimans Obtained:


none





Findings:


normal anatomy





Complications:


none





Operative Note:


After obtaining informed consent, patient was taken to OR, induced under GETA 

and prepped in the usual fashion over anterior neck.  Vertical incision was made

with cautery.  3 0 prolene placed in 1st tracheal ring and secured superiorly 

externally with steristrips.  2nd tracheal ring was entered sharply with 15 

blade.  6 shiley trach introduced under direct vision.  End tidal CO2 detected 

and patient was successfully oxygenated and ventilated via tracheostomy.  Wound 

packed with surgicel.  Tracheostomy secured with 3 0 nylon and trach collar.





Patient tolerated procedure well and sent to PACU in stable condition.  All 

counts correct.  Wound class is 4.











GAMAL ZHOU MD              Apr 6, 2020 18:17

## 2020-04-06 NOTE — NUR
pt returned from or after trachestomy placement by dr palomino and dr guardado placed a new 
central line in left subclavian. chest xray confirmed placement of line. order received from 
dr guardado to use line.

tracheostomy site clean dry and intact. trach with sutures and a soft collar.

og tube removed and a new right nare ng placed without difficulty. green-brown return via 
tube and auscultated in the left upper quadrant.

## 2020-04-06 NOTE — PDOC
Infectious Disease Note


Subjective:


Subjective


Sedated and intubated,  


afebrile last 48 hrs


TPN 


Rectal tube





Vital Signs:


Vital Signs





Vital Signs








  Date Time  Temp Pulse Resp B/P (MAP) Pulse Ox O2 Delivery O2 Flow Rate FiO2


 


4/6/20 11:00 97.9 76 18 103/65 (78) 99 Ventilator  





 97.9       











Physical Exam:


PHYSICAL EXAM


GENERAL: Orally intubated/sedated - generalized anasarca 


HEENT: Pupils equal, ETT, OGT 


NECK:  Supple 


LUNGS: Diminished aeration bases 


HEART:  S1, S2, regular 


ABDOMEN:  Distended, hypoactive BS, Rectal tube in place 


: Chion   


EXTREMITIES: Generalized edema, no cyanosis - some mottling, Rooke boots & SCDs 

bilaterally 


DERMATOLOGIC:  Warm and dry.  No generalized rash.  


CENTRAL NERVOUS SYSTEM: Sedated 


RIJ Temp HDC, RUE-PICC & LIJ





Medications:


Inpatient Meds:





Current Medications








 Medications


  (Trade)  Dose


 Ordered  Sig/Yee  Start Time


 Stop Time Status Last Admin


Dose Admin


 


 Acetaminophen


  (Tylenol Supp)  650 mg  PRN Q6HRS  PRN  3/24/20 10:30


    3/24/20 10:37


650 MG


 


 Acetaminophen


  (Tylenol)  650 mg  PRN Q6HRS  PRN  3/21/20 03:36


    3/26/20 07:35


650 MG


 


 Albumin Human  200 ml @ 


 200 mls/hr  1X  ONCE  4/5/20 12:30


 4/5/20 13:29 DC 4/5/20 13:34


200 MLS/HR


 


 Albuterol Sulfate


  (Ventolin Neb


 Soln)  2.5 mg  1X  ONCE  3/17/20 22:30


 3/17/20 22:31 DC 3/18/20 00:56


2.5 MG


 


 Alteplase,


 Recombinant


  (Cathflo For


 Central Catheter


 Clearance)  1 mg  1X  ONCE  3/31/20 22:00


 3/31/20 22:01 DC 3/31/20 22:05


1 MG


 


 Artificial Tears


  (Artificial


 Tears)  1 drop  PRN Q1HR  PRN  3/23/20 08:15


     





 


 Atenolol


  (Tenormin)  100 mg  DAILY  3/17/20 09:00


 3/16/20 20:08 DC  





 


 Atropine Sulfate


  (ATROPINE 0.5mg


 SYRINGE)  0.5 mg  PRN Q5MIN  PRN  4/2/20 08:15


     





 


 Benzocaine


  (Hurricaine One)  1 spray  1X  ONCE  3/20/20 14:30


 3/20/20 14:31 DC 3/20/20 16:38


1 SPRAY


 


 Bupivacaine HCl/


 Epinephrine Bitart


  (Sensorcain-Epi


 0.5%-1:[id number] Mpf)  30 ml  STK-MED ONCE  4/6/20 11:00


 4/6/20 11:01 DC 4/6/20 11:44


1 ML


 


 Calcium Carbonate/


 Glycine


  (Tums)  500 mg  PRN AFTMEALHC  PRN  3/18/20 17:45


     





 


 Calcium Chloride


 1000 mg/Sodium


 Chloride  110 ml @ 


 220 mls/hr  1X  ONCE  3/17/20 22:30


 3/17/20 22:59 DC 3/17/20 22:11


220 MLS/HR


 


 Calcium Chloride


 3000 mg/Sodium


 Chloride  1,030 ml @ 


 50 mls/hr  [id number]  3/19/20 08:00


 3/21/20 15:23 DC 3/21/20 02:17


50 MLS/HR


 


 Calcium Gluconate


  (Calcium


 Gluconate)  2,000 mg  1X  ONCE  3/19/20 02:15


 3/19/20 02:16 DC 3/19/20 02:19


2,000 MG


 


 Calcium Gluconate


 1000 mg/Sodium


 Chloride  110 ml @ 


 220 mls/hr  1X  ONCE  3/18/20 03:30


 3/18/20 03:59 DC 3/18/20 03:21


220 MLS/HR


 


 Calcium Gluconate


 2000 mg/Sodium


 Chloride  120 ml @ 


 220 mls/hr  1X  ONCE  3/18/20 07:30


 3/18/20 08:02 DC 3/18/20 09:05


220 MLS/HR


 


 Cefepime HCl


  (Maxipime)  2 gm  Q12HR  3/25/20 09:00


    4/6/20 08:36


2 GM


 


 Cellulose


  (Surgicel


 Fibrillar 1x2)  1 each  STK-MED ONCE  4/6/20 11:00


 4/6/20 11:01 DC  





 


 Cellulose


  (Surgicel


 Hemostat 4x8)  1 each  STK-MED ONCE  4/6/20 11:55


 4/6/20 11:56 DC 4/6/20 11:44


1 EACH


 


 Daptomycin 500 mg/


 Sodium Chloride  50 ml @ 


 100 mls/hr  Q48H  3/25/20 08:30


    4/6/20 08:35


100 MLS/HR


 


 Dexmedetomidine


 HCl 400 mcg/


 Sodium Chloride  100 ml @ 0


 mls/hr  CONT  PRN  4/2/20 08:15


    4/6/20 09:19


27.8 MLS/HR


 


 Dextrose


  (Dextrose


 50%-Water Syringe)  12.5 gm  PRN Q15MIN  PRN  3/16/20 09:30


     





 


 Digoxin


  (Lanoxin)  125 mcg  1X  ONCE  3/19/20 18:00


 3/19/20 18:01 DC 3/19/20 17:10


125 MCG


 


 Diphenhydramine


 HCl


  (Benadryl)  25 mg  1X PRN  PRN  4/5/20 12:30


 4/6/20 12:29 DC  





 


 Etomidate


  (Amidate)  8 mg  1X  ONCE  3/23/20 08:30


 3/23/20 08:31 DC 3/23/20 08:33


8 MG


 


 Fentanyl Citrate


  (Fentanyl 2ml


 Vial)  50 mcg  PRN Q5MIN  PRN  4/6/20 07:00


 4/7/20 06:59   





 


 Furosemide


  (Lasix)  20 mg  1X  ONCE  4/2/20 08:15


 4/2/20 08:16 DC 4/2/20 08:19


20 MG


 


 Heparin Sodium


  (Porcine)


  (Heparin Sodium)  5,000 unit  Q12HR  4/3/20 21:00


    4/5/20 21:21


5,000 UNIT


 


 Hydromorphone HCl


  (Dilaudid)  1 mg  PRN Q3HRS  PRN  3/17/20 12:00


 3/31/20 00:25 DC 3/23/20 05:13


1 MG


 


 Info


  (CONTRAST GIVEN


 -- Rx MONITORING)  1 each  PRN DAILY  PRN  3/30/20 11:45


 4/1/20 11:44 DC  





 


 Info


  (Icu Electrolyte


 Protocol)  1 ea  CONT PRN  PRN  3/29/20 13:15


     





 


 Info


  (PHARMACY


 MONITORING -- do


 not chart)  1 each  PRN DAILY  PRN  4/5/20 12:30


     





 


 Info


  (Tpn Per


 Pharmacy)  1 each  PRN DAILY  PRN  3/18/20 12:30


   UNV  





 


 Insulin Human


 Lispro


  (HumaLOG)  0-9 UNITS  Q6HRS  3/16/20 09:30


    4/6/20 11:23


4 UNITS


 


 Insulin Human


 Regular


  (HumuLIN R VIAL)  5 unit  1X  ONCE  3/17/20 22:30


 3/17/20 22:31 DC 3/17/20 22:14


5 UNIT


 


 Iohexol


  (Omnipaque 240


 Mg/ml)  30 ml  1X  ONCE  3/30/20 11:30


 3/30/20 11:33 DC 3/30/20 11:30


30 ML


 


 Iohexol


  (Omnipaque 300


 Mg/ml)  60 ml  1X  ONCE  3/17/20 17:00


 3/17/20 17:01 DC 3/17/20 17:20


60 ML


 


 Iohexol


  (Omnipaque 350


 Mg/ml)  90 ml  1X  ONCE  3/16/20 03:30


 3/16/20 03:31 DC 3/16/20 03:25


90 ML


 


 Ketorolac


 Tromethamine


  (Toradol 30mg


 Vial)  30 mg  1X  ONCE  3/16/20 03:00


 3/16/20 03:01 DC 3/16/20 02:54


30 MG


 


 Lidocaine HCl


  (Buffered


 Lidocaine 1%)  6 ml  1X  ONCE  4/2/20 09:00


 4/2/20 09:06 DC 4/2/20 09:05


6 ML


 


 Lidocaine HCl


  (Glydo


  (Lidocaine) Jelly)  1 thomas  1X  ONCE  3/20/20 14:30


 3/20/20 14:31 DC 3/20/20 16:38


1 THOMAS


 


 Lidocaine HCl


  (Xylocaine-Mpf


 1% 2ml Vial)  2 ml  PRN 1X  PRN  4/6/20 07:00


 4/7/20 06:59   





 


 Linezolid/Dextrose  300 ml @ 


 300 mls/hr  Q12HR  3/20/20 20:00


 3/27/20 07:50 DC 3/26/20 21:04


300 MLS/HR


 


 Lorazepam


  (Ativan Inj)  1 mg  PRN Q4HRS  PRN  3/19/20 09:00


    3/23/20 00:34


1 MG


 


 Magnesium Sulfate  50 ml @ 25


 mls/hr  PRN DAILY  PRN  4/5/20 09:15


     





 


 Meropenem 1 gm/


 Sodium Chloride  100 ml @ 


 200 mls/hr  Q8HRS  3/17/20 20:00


 3/18/20 08:48 DC 3/18/20 05:45


200 MLS/HR


 


 Meropenem 500 mg/


 Sodium Chloride  50 ml @ 


 100 mls/hr  Q6HRS  3/24/20 09:00


 3/25/20 07:29 DC 3/25/20 06:00


100 MLS/HR


 


 Metoprolol


 Tartrate


  (Lopressor Vial)  5 mg  Q6HRS  3/17/20 10:15


 3/28/20 08:48 DC 3/26/20 00:12


5 MG


 


 Metronidazole  100 ml @ 


 100 mls/hr  Q6HRS  3/23/20 08:30


    4/6/20 10:40


100 MLS/HR


 


 Micafungin Sodium


 100 mg/Dextrose  100 ml @ 


 100 mls/hr  Q24H  3/23/20 09:00


    4/6/20 08:36


100 MLS/HR


 


 Midazolam HCl


  (Versed)  5 mg  1X  ONCE  3/23/20 08:30


 3/23/20 08:31 DC  





 


 Midazolam HCl 100


 mg/Sodium Chloride  100 ml @ 7


 mls/hr  CONT  PRN  3/28/20 16:00


    4/6/20 00:40


7 MLS/HR


 


 Midazolam HCl 50


 mg/Sodium Chloride  50 ml @ 0


 mls/hr  CONT  PRN  3/23/20 08:15


 3/28/20 15:59 DC 3/26/20 22:39


7 MLS/HR


 


 Morphine Sulfate


  (Morphine


 Sulfate)  2 mg  PRN Q2HR  PRN  3/16/20 05:00


 3/17/20 14:15 DC 3/17/20 12:26


2 MG


 


 Multi-Ingred


 Cream/Lotion/Oil/


 Oint


  (Artificial


 Tears Eye


 Ointment)  1 thomas  PRN Q1HR  PRN  3/25/20 17:30


    4/3/20 11:05


1 THOMAS


 


 Norepinephrine


 Bitartrate 8 mg/


 Dextrose  258 ml @ 


 17.299 mls/


 hr  CONT  PRN  3/17/20 15:30


    4/5/20 21:18


1.73 MLS/HR


 


 Ondansetron HCl


  (Zofran)  4 mg  PRN Q6HRS  PRN  4/6/20 07:00


 4/7/20 06:59   





 


 Pantoprazole


 Sodium


  (PROTONIX VIAL


 for IV PUSH)  40 mg  DAILYAC  3/16/20 11:30


    4/6/20 08:34


40 MG


 


 Piperacillin Sod/


 Tazobactam Sod


 4.5 gm/Sodium


 Chloride  100 ml @ 


 200 mls/hr  1X  ONCE  3/16/20 06:00


 3/16/20 06:29 DC 3/16/20 05:44


200 MLS/HR


 


 Potassium


 Chloride 15 meq/


 Bicarbonate


 Dialysis Soln w/


 out KCl  5,007.5 ml


  @ 1,000 mls/


 hr  Q5H1M  3/29/20 20:00


 4/2/20 13:08 DC 4/1/20 18:14


1,000 MLS/HR


 


 Potassium


 Chloride 20 meq/


 Bicarbonate


 Dialysis Soln w/


 out KCl  5,010 ml @ 


 1,000 mls/hr  Q5H1M  3/25/20 16:00


 3/29/20 19:59 DC 3/29/20 14:54


1,000 MLS/HR


 


 Potassium


 Chloride/Water  100 ml @ 


 100 mls/hr  Q1H  3/24/20 11:00


 3/24/20 12:59 DC 3/24/20 12:12


100 MLS/HR


 


 Potassium


 Phosphate 20 mmol/


 Sodium Chloride  106.6667


 ml @ 


 51.667 m...  1X  ONCE  3/25/20 13:00


 3/25/20 15:03 DC 3/25/20 12:51


51.667 MLS/HR


 


 Prochlorperazine


 Edisylate


  (Compazine)  5 mg  PACU PRN  PRN  4/6/20 07:00


 4/7/20 06:59   





 


 Propofol  20 ml @ As


 Directed  STK-MED ONCE  4/6/20 11:07


 4/6/20 11:07 DC  





 


 Ringer's Solution  1,000 ml @ 


 30 mls/hr  Q24H  4/6/20 07:00


 4/6/20 18:59   





 


 Sodium


 Bicarbonate 50


 meq/Sodium


 Chloride  1,050 ml @ 


 75 mls/hr  Q14H  3/18/20 07:30


 3/23/20 10:28 DC 3/22/20 21:10


75 MLS/HR


 


 Sodium Chloride


  (Normal Saline


 Flush)  10 ml  1X PRN  PRN  4/3/20 07:30


 4/4/20 07:29 DC  





 


 Sodium Chloride


 90 meq/Calcium


 Gluconate 10 meq/


 Multivitamins 10


 ml/Chromium/


 Copper/Manganese/


 Seleni/Zn 0.5 ml/


 Total Parenteral


 Nutrition/Amino


 Acids/Dextrose/


 Fat Emulsion


 Intravenous  1,512 ml @ 


 63 mls/hr  TPN  CONT  3/18/20 22:00


 3/19/20 21:59 DC 3/18/20 22:06


63 MLS/HR


 


 Sodium Chloride


 90 meq/Calcium


 Gluconate 10 meq/


 Multivitamins 10


 ml/Chromium/


 Copper/Manganese/


 Seleni/Zn 1 ml/


 Total Parenteral


 Nutrition/Amino


 Acids/Dextrose/


 Fat Emulsion


 Intravenous  55.005 ml


  @ 2.292


 mls/hr  TPN  CONT  3/18/20 22:00


 3/18/20 12:33 DC  





 


 Sodium Chloride


 90 meq/Magnesium


 Sulfate 10 meq/


 Calcium Gluconate


 20 meq/


 Multivitamins 10


 ml/Chromium/


 Copper/Manganese/


 Seleni/Zn 0.5 ml/


 Total Parenteral


 Nutrition/Amino


 Acids/Dextrose/


 Fat Emulsion


 Intravenous  1,512 ml @ 


 63 mls/hr  TPN  CONT  3/19/20 22:00


 3/20/20 21:59 DC 3/19/20 22:25


63 MLS/HR


 


 Sodium Chloride


 90 meq/Potassium


 Chloride 15 meq/


 Potassium


 Phosphate 10 mmol/


 Magnesium Sulfate


 8 meq/Calcium


 Gluconate 15 meq/


 Multivitamins 10


 ml/Chromium/


 Copper/Manganese/


 Seleni/Zn 0.5 ml/


 Insulin Human


 Regular 25 unit/


 Total Parenteral


 Nutrition/Amino


 Acids/Dextrose/


 Fat Emulsion


 Intravenous  1,400 ml @ 


 58.333 mls/


 hr  TPN  CONT  4/5/20 22:00


 4/6/20 21:59  4/5/20 21:20


58.333 MLS/HR


 


 Sodium Chloride


 90 meq/Potassium


 Chloride 15 meq/


 Potassium


 Phosphate 10 mmol/


 Magnesium Sulfate


 10 meq/Calcium


 Gluconate 20 meq/


 Multivitamins 10


 ml/Chromium/


 Copper/Manganese/


 Seleni/Zn 0.5 ml/


 Total Parenteral


 Nutrition/Amino


 Acids/Dextrose/


 Fat Emulsion


 Intravenous  1,400 ml @ 


 58.333 mls/


 hr  TPN  CONT  3/23/20 22:00


 3/24/20 21:59 DC 3/23/20 21:42


58.333 MLS/HR


 


 Sodium Chloride


 90 meq/Potassium


 Chloride 15 meq/


 Potassium


 Phosphate 10 mmol/


 Magnesium Sulfate


 12 meq/Calcium


 Gluconate 15 meq/


 Multivitamins 10


 ml/Chromium/


 Copper/Manganese/


 Seleni/Zn 0.5 ml/


 Insulin Human


 Regular 25 unit/


 Total Parenteral


 Nutrition/Amino


 Acids/Dextrose/


 Fat Emulsion


 Intravenous  1,400 ml @ 


 58.333 mls/


 hr  TPN  CONT  4/6/20 22:00


 4/7/20 21:59   





 


 Sodium Chloride


 90 meq/Potassium


 Chloride 15 meq/


 Potassium


 Phosphate 15 mmol/


 Magnesium Sulfate


 10 meq/Calcium


 Gluconate 15 meq/


 Multivitamins 10


 ml/Chromium/


 Copper/Manganese/


 Seleni/Zn 0.5 ml/


 Total Parenteral


 Nutrition/Amino


 Acids/Dextrose/


 Fat Emulsion


 Intravenous  1,400 ml @ 


 58.333 mls/


 hr  TPN  CONT  3/24/20 22:00


 3/25/20 21:59 DC 3/24/20 22:17


58.333 MLS/HR


 


 Sodium Chloride


 90 meq/Potassium


 Chloride 15 meq/


 Potassium


 Phosphate 15 mmol/


 Magnesium Sulfate


 10 meq/Calcium


 Gluconate 20 meq/


 Multivitamins 10


 ml/Chromium/


 Copper/Manganese/


 Seleni/Zn 0.5 ml/


 Total Parenteral


 Nutrition/Amino


 Acids/Dextrose/


 Fat Emulsion


 Intravenous  1,200 ml @ 


 50 mls/hr  TPN  CONT  3/22/20 22:00


 3/22/20 14:17 DC  





 


 Sodium Chloride


 90 meq/Potassium


 Chloride 15 meq/


 Potassium


 Phosphate 18 mmol/


 Magnesium Sulfate


 8 meq/Calcium


 Gluconate 15 meq/


 Multivitamins 10


 ml/Chromium/


 Copper/Manganese/


 Seleni/Zn 0.5 ml/


 Insulin Human


 Regular 10 unit/


 Total Parenteral


 Nutrition/Amino


 Acids/Dextrose/


 Fat Emulsion


 Intravenous  1,400 ml @ 


 58.333 mls/


 hr  TPN  CONT  3/27/20 22:00


 3/28/20 21:59 DC 3/27/20 21:43


58.333 MLS/HR


 


 Sodium Chloride


 90 meq/Potassium


 Chloride 15 meq/


 Potassium


 Phosphate 18 mmol/


 Magnesium Sulfate


 8 meq/Calcium


 Gluconate 15 meq/


 Multivitamins 10


 ml/Chromium/


 Copper/Manganese/


 Seleni/Zn 0.5 ml/


 Insulin Human


 Regular 15 unit/


 Total Parenteral


 Nutrition/Amino


 Acids/Dextrose/


 Fat Emulsion


 Intravenous  1,400 ml @ 


 58.333 mls/


 hr  TPN  CONT  3/30/20 22:00


 3/31/20 21:59 DC 3/30/20 21:47


58.333 MLS/HR


 


 Sodium Chloride


 90 meq/Potassium


 Chloride 15 meq/


 Potassium


 Phosphate 18 mmol/


 Magnesium Sulfate


 8 meq/Calcium


 Gluconate 15 meq/


 Multivitamins 10


 ml/Chromium/


 Copper/Manganese/


 Seleni/Zn 0.5 ml/


 Insulin Human


 Regular 20 unit/


 Total Parenteral


 Nutrition/Amino


 Acids/Dextrose/


 Fat Emulsion


 Intravenous  1,400 ml @ 


 58.333 mls/


 hr  TPN  CONT  4/2/20 22:00


 4/3/20 21:59 DC 4/2/20 22:45


58.333 MLS/HR


 


 Sodium Chloride


 90 meq/Potassium


 Chloride 15 meq/


 Potassium


 Phosphate 18 mmol/


 Magnesium Sulfate


 8 meq/Calcium


 Gluconate 15 meq/


 Multivitamins 10


 ml/Chromium/


 Copper/Manganese/


 Seleni/Zn 0.5 ml/


 Total Parenteral


 Nutrition/Amino


 Acids/Dextrose/


 Fat Emulsion


 Intravenous  1,400 ml @ 


 58.333 mls/


 hr  TPN  CONT  3/26/20 22:00


 3/27/20 21:59 DC 3/26/20 22:00


58.333 MLS/HR


 


 Succinylcholine


 Chloride


  (Anectine)  120 mg  1X  ONCE  3/23/20 08:30


 3/23/20 08:31 DC 3/23/20 08:34


120 MG











Labs:


Lab





Laboratory Tests








Test


 4/5/20


18:09 4/5/20


23:55 4/6/20


05:26 4/6/20


05:45


 


Glucose (Fingerstick)


 133 mg/dL


(70-99) 158 mg/dL


(70-99) 157 mg/dL


(70-99) 





 


Hemoglobin


 


 


 


 7.8 g/dL


(12.0-15.5)


 


Sodium Level


 


 


 


 139 mmol/L


(136-145)


 


Potassium Level


 


 


 


 4.2 mmol/L


(3.5-5.1)


 


Chloride Level


 


 


 


 103 mmol/L


()


 


Carbon Dioxide Level


 


 


 


 25 mmol/L


(21-32)


 


Anion Gap    11 (6-14) 


 


Blood Urea Nitrogen


 


 


 


 48 mg/dL


(7-20)


 


Creatinine


 


 


 


 1.8 mg/dL


(0.6-1.0)


 


Estimated GFR


(Cockcroft-Gault) 


 


 


 29.9 





 


Glucose Level


 


 


 


 167 mg/dL


(70-99)


 


Calcium Level


 


 


 


 8.6 mg/dL


(8.5-10.1)


 


Phosphorus Level


 


 


 


 3.9 mg/dL


(2.6-4.7)


 


Magnesium Level


 


 


 


 1.8 mg/dL


(1.8-2.4)


 


Albumin


 


 


 


 2.6 g/dL


(3.4-5.0)


 


Test


 4/6/20


07:40 4/6/20


11:21 


 





 


O2 Saturation 97 % (92-99)    


 


Arterial Blood pH


 7.42


(7.35-7.45) 


 


 





 


Arterial Blood pCO2 at


Patient Temp 37 mmHg


(35-46) 


 


 





 


Arterial Blood pO2 at Patient


Temp 105 mmHg


() 


 


 





 


Arterial Blood HCO3


 24 mmol/L


(21-28) 


 


 





 


Arterial Blood Base Excess


 -1 mmol/L


(-3-3) 


 


 





 


FiO2 40    


 


Glucose (Fingerstick)


 


 169 mg/dL


(70-99) 


 














Objective:


Assessment:


 


Acute pancreatitis, early developing necrosis


Cholelithiasis


Leucocytosis improving


JED,Hyperkalemia, Metabolic acidosis on HD


Acute hypoxic resp failure ,bilateral pleural effusion


hypocalcemia 


Prediabetes


HTN





Plan:


Plan of Care


Continue Dapto/cefepime (3/25), Flagyl and micafungin (3/23) 


   -Previously on Merrem, Zyvox 


f/u cults 


Maintain aspiration precautions 


COVID-19 neg, 3/26  


C diff negative 3/23


DC Lt IJ 


 


D/w nursing





Critically ill











IVAN FRANZ MD            Apr 6, 2020 12:32

## 2020-04-06 NOTE — RAD
EXAM: CHEST ONE VIEW.

 

HISTORY: Pleural effusions, intubated, respiratory failure.

 

COMPARISON: 04/03/2020.

 

FINDINGS: A frontal view of the chest is obtained. An endotracheal tube 

has its tip 4 cm above the nati. A nasogastric tube has its tip below 

the inferior margin of the view. A left subclavian central venous catheter

has its tip in the superior cavoatrial junction. A right internal jugular 

hemodialysis catheter has its tip at the superior cavoatrial junction. A 

right arm PICC line has its tip in the proximal superior vena cava.

 

There are moderate to large posteriorly layering pleural effusions 

bilaterally. These appear mildly increased. Focal atelectasis is noted 

along the right hilum. There is no pneumothorax. The heart is not 

enlarged.

 

IMPRESSION: 

1. Increased moderate to large bilateral posteriorly layering pleural 

effusions with associated atelectasis.

 

Electronically signed by: ASIF Tripathi MD (4/6/2020 5:03 AM) [facility]

## 2020-04-06 NOTE — PDOC
PULMONARY PROGRESS NOTES


Subjective


Patient intubated on 3/23 , sedated


Currently on assist control ventilation  450 tidal volume 6 of PEEP ,40%FIO2 


Nursing reports 600cc + overnight from OG, remains on levophed low dose


Plan for trach today


Vitals





Vital Signs








  Date Time  Temp Pulse Resp B/P (MAP) Pulse Ox O2 Delivery O2 Flow Rate FiO2


 


4/6/20 07:48      Mechanical Ventilator  


 


4/6/20 07:39     100   


 


4/6/20 07:00 97.7 77 18 104/65 (78)    





 97.7       








Comments


intubated/sedated


Lungs:  Other (dimished in BLL)


Cardiovascular:  S1, S2


Abdomen:  Soft, Non-tender


Extremities:  Other (+3 generalized edema )


Skin:  Warm, Dry


Labs





Laboratory Tests








Test


 4/4/20


14:54 4/4/20


17:50 4/4/20


18:38 4/4/20


23:52


 


Urine Collection Type Unknown    


 


Urine Color Red    


 


Urine Clarity Cloudy    


 


Urine pH 5.0 (<5.0-8.0)    


 


Urine Specific Gravity


 1.025


(1.000-1.030) 


 


 





 


Urine Protein


 100 mg/dL


(NEG-TRACE) 


 


 





 


Urine Glucose (UA)


 Negative mg/dL


(NEG) 


 


 





 


Urine Ketones (Stick)


 Trace mg/dL


(NEG) 


 


 





 


Urine Blood Large (NEG)    


 


Urine Nitrite Positive (NEG)    


 


Urine Bilirubin Small (NEG)    


 


Urine Urobilinogen Dipstick


 0.2 mg/dL (0.2


mg/dL) 


 


 





 


Urine Leukocyte Esterase Small (NEG)    


 


Urine RBC


 Rare /HPF


(0-2) 


 


 





 


Urine WBC 1-4 /HPF (0-4)    


 


Urine Squamous Epithelial


Cells Occ /LPF 


 


 


 





 


Urine Amorphous Sediment Present /HPF    


 


Urine Bacteria 0 /HPF (0-FEW)    


 


O2 Saturation  98 % (92-99)   


 


Arterial Blood pH


 


 7.48


(7.35-7.45) 


 





 


Arterial Blood pCO2 at


Patient Temp 


 30 mmHg


(35-46) 


 





 


Arterial Blood pO2 at Patient


Temp 


 122 mmHg


() 


 





 


Arterial Blood HCO3


 


 22 mmol/L


(21-28) 


 





 


Arterial Blood Base Excess


 


 -1 mmol/L


(-3-3) 


 





 


FiO2  50   


 


Glucose (Fingerstick)


 


 


 171 mg/dL


(70-99) 173 mg/dL


(70-99)


 


Test


 4/5/20


06:00 4/5/20


06:17 4/5/20


08:00 4/5/20


11:44


 


White Blood Count


 9.6 x10^3/uL


(4.0-11.0) 


 


 





 


Red Blood Count


 2.42 x10^6/uL


(3.50-5.40) 


 


 





 


Hemoglobin


 7.8 g/dL


(12.0-15.5) 


 


 





 


Hematocrit


 23.8 %


(36.0-47.0) 


 


 





 


Mean Corpuscular Volume 98 fL ()    


 


Mean Corpuscular Hemoglobin 32 pg (25-35)    


 


Mean Corpuscular Hemoglobin


Concent 33 g/dL


(31-37) 


 


 





 


Red Cell Distribution Width


 19.5 %


(11.5-14.5) 


 


 





 


Platelet Count


 198 x10^3/uL


(140-400) 


 


 





 


Neutrophils (%) (Auto) 76 % (31-73)    


 


Lymphocytes (%) (Auto) 10 % (24-48)    


 


Monocytes (%) (Auto) 7 % (0-9)    


 


Eosinophils (%) (Auto) 7 % (0-3)    


 


Basophils (%) (Auto) 1 % (0-3)    


 


Neutrophils # (Auto)


 7.3 x10^3/uL


(1.8-7.7) 


 


 





 


Lymphocytes # (Auto)


 0.9 x10^3/uL


(1.0-4.8) 


 


 





 


Monocytes # (Auto)


 0.7 x10^3/uL


(0.0-1.1) 


 


 





 


Eosinophils # (Auto)


 0.6 x10^3/uL


(0.0-0.7) 


 


 





 


Basophils # (Auto)


 0.0 x10^3/uL


(0.0-0.2) 


 


 





 


Sodium Level


 139 mmol/L


(136-145) 


 


 





 


Potassium Level


 4.0 mmol/L


(3.5-5.1) 


 


 





 


Chloride Level


 104 mmol/L


() 


 


 





 


Carbon Dioxide Level


 26 mmol/L


(21-32) 


 


 





 


Anion Gap 9 (6-14)    


 


Blood Urea Nitrogen


 54 mg/dL


(7-20) 


 


 





 


Creatinine


 1.9 mg/dL


(0.6-1.0) 


 


 





 


Estimated GFR


(Cockcroft-Gault) 28.1 


 


 


 





 


BUN/Creatinine Ratio 28 (6-20)    


 


Glucose Level


 164 mg/dL


(70-99) 


 


 





 


Calcium Level


 8.3 mg/dL


(8.5-10.1) 


 


 





 


Total Bilirubin


 1.0 mg/dL


(0.2-1.0) 


 


 





 


Aspartate Amino Transf


(AST/SGOT) 47 U/L (15-37) 


 


 


 





 


Alanine Aminotransferase


(ALT/SGPT) 20 U/L (14-59) 


 


 


 





 


Alkaline Phosphatase


 81 U/L


() 


 


 





 


Total Protein


 5.0 g/dL


(6.4-8.2) 


 


 





 


Albumin


 2.3 g/dL


(3.4-5.0) 


 


 





 


Albumin/Globulin Ratio 0.9 (1.0-1.7)    


 


Glucose (Fingerstick)


 


 167 mg/dL


(70-99) 


 162 mg/dL


(70-99)


 


O2 Saturation   97 % (92-99)  


 


Arterial Blood pH


 


 


 7.42


(7.35-7.45) 





 


Arterial Blood pCO2 at


Patient Temp 


 


 35 mmHg


(35-46) 





 


Arterial Blood pO2 at Patient


Temp 


 


 104 mmHg


() 





 


Arterial Blood HCO3


 


 


 22 mmol/L


(21-28) 





 


Arterial Blood Base Excess


 


 


 -2 mmol/L


(-3-3) 





 


FiO2   45  


 


Test


 4/5/20


18:09 4/5/20


23:55 4/6/20


05:26 4/6/20


05:45


 


Glucose (Fingerstick)


 133 mg/dL


(70-99) 158 mg/dL


(70-99) 157 mg/dL


(70-99) 





 


Hemoglobin


 


 


 


 7.8 g/dL


(12.0-15.5)


 


Sodium Level


 


 


 


 139 mmol/L


(136-145)


 


Potassium Level


 


 


 


 4.2 mmol/L


(3.5-5.1)


 


Chloride Level


 


 


 


 103 mmol/L


()


 


Carbon Dioxide Level


 


 


 


 25 mmol/L


(21-32)


 


Anion Gap    11 (6-14) 


 


Blood Urea Nitrogen


 


 


 


 48 mg/dL


(7-20)


 


Creatinine


 


 


 


 1.8 mg/dL


(0.6-1.0)


 


Estimated GFR


(Cockcroft-Gault) 


 


 


 29.9 





 


Glucose Level


 


 


 


 167 mg/dL


(70-99)


 


Calcium Level


 


 


 


 8.6 mg/dL


(8.5-10.1)


 


Phosphorus Level


 


 


 


 3.9 mg/dL


(2.6-4.7)


 


Magnesium Level


 


 


 


 1.8 mg/dL


(1.8-2.4)


 


Albumin


 


 


 


 2.6 g/dL


(3.4-5.0)


 


Test


 4/6/20


07:40 


 


 





 


O2 Saturation 97 % (92-99)    


 


Arterial Blood pH


 7.42


(7.35-7.45) 


 


 





 


Arterial Blood pCO2 at


Patient Temp 37 mmHg


(35-46) 


 


 





 


Arterial Blood pO2 at Patient


Temp 105 mmHg


() 


 


 





 


Arterial Blood HCO3


 24 mmol/L


(21-28) 


 


 





 


Arterial Blood Base Excess


 -1 mmol/L


(-3-3) 


 


 





 


FiO2 40    








Laboratory Tests








Test


 4/5/20


11:44 4/5/20


18:09 4/5/20


23:55 4/6/20


05:26


 


Glucose (Fingerstick)


 162 mg/dL


(70-99) 133 mg/dL


(70-99) 158 mg/dL


(70-99) 157 mg/dL


(70-99)


 


Test


 4/6/20


05:45 4/6/20


07:40 


 





 


Hemoglobin


 7.8 g/dL


(12.0-15.5) 


 


 





 


Sodium Level


 139 mmol/L


(136-145) 


 


 





 


Potassium Level


 4.2 mmol/L


(3.5-5.1) 


 


 





 


Chloride Level


 103 mmol/L


() 


 


 





 


Carbon Dioxide Level


 25 mmol/L


(21-32) 


 


 





 


Anion Gap 11 (6-14)    


 


Blood Urea Nitrogen


 48 mg/dL


(7-20) 


 


 





 


Creatinine


 1.8 mg/dL


(0.6-1.0) 


 


 





 


Estimated GFR


(Cockcroft-Gault) 29.9 


 


 


 





 


Glucose Level


 167 mg/dL


(70-99) 


 


 





 


Calcium Level


 8.6 mg/dL


(8.5-10.1) 


 


 





 


Phosphorus Level


 3.9 mg/dL


(2.6-4.7) 


 


 





 


Magnesium Level


 1.8 mg/dL


(1.8-2.4) 


 


 





 


Albumin


 2.6 g/dL


(3.4-5.0) 


 


 





 


O2 Saturation  97 % (92-99)   


 


Arterial Blood pH


 


 7.42


(7.35-7.45) 


 





 


Arterial Blood pCO2 at


Patient Temp 


 37 mmHg


(35-46) 


 





 


Arterial Blood pO2 at Patient


Temp 


 105 mmHg


() 


 





 


Arterial Blood HCO3


 


 24 mmol/L


(21-28) 


 





 


Arterial Blood Base Excess


 


 -1 mmol/L


(-3-3) 


 





 


FiO2  40   








Medications





Active Scripts








 Medications  Dose


 Route/Sig


 Max Daily Dose Days Date Category


 


 Bisoprolol


 Fumarate 5 Mg


 Tablet  10 Mg


 PO DAILY


   3/16/20 Reported








Comments


Chest x-ray reviewed 4/6





IMPRESSION: 


1. Increased moderate to large bilateral posteriorly layering pleural 


effusions with associated atelectasis.





Impression


.


IMPRESSION:


1.  Acute hypoxemic respiratory failure secondary to ARDS due to acute 

pancreatitis, septic shock, abdominal distention, and pneumonia.and pleural 

effusions.  Pleural effusions secondary to abdominal process and/or general 

volume overload from renal failure- ongoing, plan for trach 4/6/20


2.  Gallstone pancreatitis. WITH NECROSIS


3.  Severe metabolic acidosis.stable


4.  Acute kidney injury-stable


5.  Acute gallstone pancreatitis.


6.  Hypoalbuminemia.


7.  Hypocalcemia.


8.  Leukocytosis


9.  Chronic anemia


10. Covid 19 testing negative


11. Acute /chronic anemia suspected hemorrhagic fluid in pelvis


12, Fever per ID





Plan


.


AC mode, plan for trach today, hold CPAP trial, will also replace central line 

today 


Transfuse prn, check H/H 


Not a surgical candidate per surgery


Antibiotics per ID, 


Follow nephrology input,HD per renal


Nutritional support with TPN


Prognosis is guarded





DVT/GI PPX 


d/w RN/RT








FULL CODE











SHARYN SOLORZANO MD                  Apr 6, 2020 09:27

## 2020-04-06 NOTE — PDOC
Objective:


Objective:


Reviewed chart.


Vital Signs:





                                   Vital Signs








  Date Time  Temp Pulse Resp B/P (MAP) Pulse Ox O2 Delivery O2 Flow Rate FiO2


 


4/6/20 11:00 97.9 76 18 103/65 (78) 99 Ventilator  





 97.9       








Labs:





Laboratory Tests








Test


 4/5/20


18:09 4/5/20


23:55 4/6/20


05:26 4/6/20


05:45


 


Glucose (Fingerstick) 133 mg/dL  158 mg/dL  157 mg/dL  


 


Hemoglobin    7.8 g/dL 


 


Sodium Level    139 mmol/L 


 


Potassium Level    4.2 mmol/L 


 


Chloride Level    103 mmol/L 


 


Carbon Dioxide Level    25 mmol/L 


 


Anion Gap    11 


 


Blood Urea Nitrogen    48 mg/dL 


 


Creatinine    1.8 mg/dL 


 


Estimated GFR


(Cockcroft-Gault) 


 


 


 29.9 





 


Glucose Level    167 mg/dL 


 


Calcium Level    8.6 mg/dL 


 


Phosphorus Level    3.9 mg/dL 


 


Magnesium Level    1.8 mg/dL 


 


Albumin    2.6 g/dL 


 


Test


 4/6/20


07:40 4/6/20


11:21 


 





 


O2 Saturation 97 %    


 


Arterial Blood pH 7.42    


 


Arterial Blood pCO2 at


Patient Temp 37 mmHg 


 


 


 





 


Arterial Blood pO2 at Patient


Temp 105 mmHg 


 


 


 





 


Arterial Blood HCO3 24 mmol/L    


 


Arterial Blood Base Excess -1 mmol/L    


 


FiO2 40    


 


Glucose (Fingerstick)  169 mg/dL   





  BLOOD CULTURE  Preliminary  


        NO GROWTH AFTER 2 DAYS


Imaging:


CXR 4/6


IMPRESSION: 


1. Increased moderate to large bilateral posteriorly layering pleural effusions 

with associated atelectasis.





PE:


none





A/P:


Gallstone pancreatitis, MOSF





--


Out for tracheostomy, will follow.





Hemodynamically unstable?:  No


Is patient in severe pain?:  No


Is NPO status required?:  Yes











RAGHAVENDRA MURCIA          Apr 6, 2020 12:00

## 2020-04-07 VITALS — SYSTOLIC BLOOD PRESSURE: 126 MMHG | DIASTOLIC BLOOD PRESSURE: 79 MMHG

## 2020-04-07 VITALS — SYSTOLIC BLOOD PRESSURE: 142 MMHG | DIASTOLIC BLOOD PRESSURE: 90 MMHG

## 2020-04-07 VITALS — DIASTOLIC BLOOD PRESSURE: 85 MMHG | SYSTOLIC BLOOD PRESSURE: 137 MMHG

## 2020-04-07 VITALS — DIASTOLIC BLOOD PRESSURE: 61 MMHG | SYSTOLIC BLOOD PRESSURE: 105 MMHG

## 2020-04-07 VITALS — SYSTOLIC BLOOD PRESSURE: 106 MMHG | DIASTOLIC BLOOD PRESSURE: 65 MMHG

## 2020-04-07 VITALS — DIASTOLIC BLOOD PRESSURE: 68 MMHG | SYSTOLIC BLOOD PRESSURE: 112 MMHG

## 2020-04-07 VITALS — DIASTOLIC BLOOD PRESSURE: 80 MMHG | SYSTOLIC BLOOD PRESSURE: 147 MMHG

## 2020-04-07 VITALS — SYSTOLIC BLOOD PRESSURE: 104 MMHG | DIASTOLIC BLOOD PRESSURE: 64 MMHG

## 2020-04-07 VITALS — DIASTOLIC BLOOD PRESSURE: 54 MMHG | SYSTOLIC BLOOD PRESSURE: 110 MMHG

## 2020-04-07 VITALS — DIASTOLIC BLOOD PRESSURE: 65 MMHG | SYSTOLIC BLOOD PRESSURE: 106 MMHG

## 2020-04-07 VITALS — SYSTOLIC BLOOD PRESSURE: 104 MMHG | DIASTOLIC BLOOD PRESSURE: 62 MMHG

## 2020-04-07 VITALS — SYSTOLIC BLOOD PRESSURE: 105 MMHG | DIASTOLIC BLOOD PRESSURE: 60 MMHG

## 2020-04-07 VITALS — SYSTOLIC BLOOD PRESSURE: 140 MMHG | DIASTOLIC BLOOD PRESSURE: 92 MMHG

## 2020-04-07 VITALS — SYSTOLIC BLOOD PRESSURE: 109 MMHG | DIASTOLIC BLOOD PRESSURE: 62 MMHG

## 2020-04-07 VITALS — SYSTOLIC BLOOD PRESSURE: 137 MMHG | DIASTOLIC BLOOD PRESSURE: 76 MMHG

## 2020-04-07 VITALS — SYSTOLIC BLOOD PRESSURE: 104 MMHG | DIASTOLIC BLOOD PRESSURE: 65 MMHG

## 2020-04-07 VITALS — DIASTOLIC BLOOD PRESSURE: 89 MMHG | SYSTOLIC BLOOD PRESSURE: 156 MMHG

## 2020-04-07 VITALS — DIASTOLIC BLOOD PRESSURE: 57 MMHG | SYSTOLIC BLOOD PRESSURE: 103 MMHG

## 2020-04-07 VITALS — DIASTOLIC BLOOD PRESSURE: 71 MMHG | SYSTOLIC BLOOD PRESSURE: 117 MMHG

## 2020-04-07 VITALS — SYSTOLIC BLOOD PRESSURE: 107 MMHG | DIASTOLIC BLOOD PRESSURE: 61 MMHG

## 2020-04-07 VITALS — SYSTOLIC BLOOD PRESSURE: 99 MMHG | DIASTOLIC BLOOD PRESSURE: 65 MMHG

## 2020-04-07 LAB
ALBUMIN SERPL-MCNC: 2.9 G/DL (ref 3.4–5)
ANION GAP SERPL CALC-SCNC: 10 MMOL/L (ref 6–14)
BASE EXCESS ABG: -5 MMOL/L (ref -3–3)
BUN SERPL-MCNC: 73 MG/DL (ref 7–20)
CALCIUM SERPL-MCNC: 8.9 MG/DL (ref 8.5–10.1)
CHLORIDE SERPL-SCNC: 104 MMOL/L (ref 98–107)
CO2 SERPL-SCNC: 25 MMOL/L (ref 21–32)
CREAT SERPL-MCNC: 2.3 MG/DL (ref 0.6–1)
ERYTHROCYTE [DISTWIDTH] IN BLOOD BY AUTOMATED COUNT: 18.9 % (ref 11.5–14.5)
GFR SERPLBLD BASED ON 1.73 SQ M-ARVRAT: 22.5 ML/MIN
GLUCOSE SERPL-MCNC: 155 MG/DL (ref 70–99)
HCO3 BLDA-SCNC: 22 MMOL/L (ref 21–28)
HCT VFR BLD CALC: 24.9 % (ref 36–47)
HGB BLD-MCNC: 8 G/DL (ref 12–15.5)
INSPIRATION SETTING TIME VENT: 40
MCH RBC QN AUTO: 32 PG (ref 25–35)
MCHC RBC AUTO-ENTMCNC: 32 G/DL (ref 31–37)
MCV RBC AUTO: 99 FL (ref 79–100)
PCO2 BLDA: 45 MMHG (ref 35–46)
PHOSPHATE SERPL-MCNC: 5.8 MG/DL (ref 2.6–4.7)
PLATELET # BLD AUTO: 270 X10^3/UL (ref 140–400)
PO2 BLDA: 95 MMHG (ref 75–108)
POTASSIUM SERPL-SCNC: 4.6 MMOL/L (ref 3.5–5.1)
RBC # BLD AUTO: 2.5 X10^6/UL (ref 3.5–5.4)
SAO2 % BLDA: 96 % (ref 92–99)
SODIUM SERPL-SCNC: 139 MMOL/L (ref 136–145)
WBC # BLD AUTO: 11 X10^3/UL (ref 4–11)

## 2020-04-07 RX ADMIN — INSULIN LISPRO SCH UNITS: 100 INJECTION, SOLUTION INTRAVENOUS; SUBCUTANEOUS at 18:00

## 2020-04-07 RX ADMIN — DEXTROSE SCH MLS/HR: 50 INJECTION, SOLUTION INTRAVENOUS at 10:00

## 2020-04-07 RX ADMIN — DEXMEDETOMIDINE HYDROCHLORIDE PRN MLS/HR: 100 INJECTION, SOLUTION, CONCENTRATE INTRAVENOUS at 18:01

## 2020-04-07 RX ADMIN — DEXMEDETOMIDINE HYDROCHLORIDE PRN MLS/HR: 100 INJECTION, SOLUTION, CONCENTRATE INTRAVENOUS at 10:04

## 2020-04-07 RX ADMIN — INSULIN LISPRO SCH UNITS: 100 INJECTION, SOLUTION INTRAVENOUS; SUBCUTANEOUS at 00:30

## 2020-04-07 RX ADMIN — MIDAZOLAM HYDROCHLORIDE PRN MLS/HR: 5 INJECTION, SOLUTION INTRAMUSCULAR; INTRAVENOUS at 02:21

## 2020-04-07 RX ADMIN — CEFEPIME SCH GM: 2 INJECTION, POWDER, FOR SOLUTION INTRAVENOUS at 09:43

## 2020-04-07 RX ADMIN — DEXMEDETOMIDINE HYDROCHLORIDE PRN MLS/HR: 100 INJECTION, SOLUTION, CONCENTRATE INTRAVENOUS at 13:25

## 2020-04-07 RX ADMIN — CEFEPIME SCH GM: 2 INJECTION, POWDER, FOR SOLUTION INTRAVENOUS at 20:56

## 2020-04-07 RX ADMIN — HEPARIN SODIUM SCH UNIT: 5000 INJECTION, SOLUTION INTRAVENOUS; SUBCUTANEOUS at 20:56

## 2020-04-07 RX ADMIN — DEXMEDETOMIDINE HYDROCHLORIDE PRN MLS/HR: 100 INJECTION, SOLUTION, CONCENTRATE INTRAVENOUS at 21:17

## 2020-04-07 RX ADMIN — INSULIN LISPRO SCH UNITS: 100 INJECTION, SOLUTION INTRAVENOUS; SUBCUTANEOUS at 12:00

## 2020-04-07 RX ADMIN — HEPARIN SODIUM SCH UNIT: 5000 INJECTION, SOLUTION INTRAVENOUS; SUBCUTANEOUS at 09:47

## 2020-04-07 RX ADMIN — Medication PRN EACH: at 13:07

## 2020-04-07 RX ADMIN — DEXMEDETOMIDINE HYDROCHLORIDE PRN MLS/HR: 100 INJECTION, SOLUTION, CONCENTRATE INTRAVENOUS at 06:53

## 2020-04-07 RX ADMIN — INSULIN LISPRO SCH UNITS: 100 INJECTION, SOLUTION INTRAVENOUS; SUBCUTANEOUS at 23:41

## 2020-04-07 RX ADMIN — PANTOPRAZOLE SODIUM SCH MG: 40 INJECTION, POWDER, FOR SOLUTION INTRAVENOUS at 09:43

## 2020-04-07 RX ADMIN — DEXMEDETOMIDINE HYDROCHLORIDE PRN MLS/HR: 100 INJECTION, SOLUTION, CONCENTRATE INTRAVENOUS at 03:00

## 2020-04-07 RX ADMIN — INSULIN LISPRO SCH UNITS: 100 INJECTION, SOLUTION INTRAVENOUS; SUBCUTANEOUS at 06:00

## 2020-04-07 RX ADMIN — MIDAZOLAM HYDROCHLORIDE PRN MLS/HR: 5 INJECTION, SOLUTION INTRAMUSCULAR; INTRAVENOUS at 13:26

## 2020-04-07 NOTE — PDOC
PROGRESS NOTES


Chief Complaint


Chief Complaint


Respiratory failure requiring mechanical ventilation


Severe Acute gallstone pancreatitis (not a surgical candidate at this time) with

necrosis


Acute kidney failure now requiring dialysis


Salpingitis


Gallstones (Calculus of gallbladder with acute cholecystitis without 

obstruction)


HTN 


Leukocytosis 


Hypoxia


Uterine fibroid


Hypoxia with respiratory failure


Intractable pain


Intractable nausea


Covid 19 negative. 


Acute on chronic anemia will have to follow up since there is suspicion for 

hemorrhagic fluid in pelvis





Plan:


continue supportive measures


grim prognosis


discussed with surgical consultant


planning for trach today





History of Present Illness


History of Present Illness


4/7/2020


No acute events reported overnight, case discussed with nursing staff patient in

no acute distress no complaints during my visit, most likely patient will be 

moving to LTAC soon





4/6/2020


Plans for trach int he am and dialysis in the afternoon, no acute events 

reported overnight. 





4/5/2020


No acute events reported overnight, case discussed with nursing staff patient in

no acute distress during my visit





4/4/2020


No acute events reported overnight, patient receiving dialysis today, case 

discussed with nursing staff patient in no acute distress during my visit








4/3/2020


No new changes remains critically ill, off CRRT, still planning for trach on 

Monday unless we manage to wean over the weekend





4/2/2020


Continues to remain critically ill.  The patient unable to be taken off 

ventilatory support as of yet.,  Discussed with pulmonary consultant.  Planning 

all tracheostomy on Monday if he is unable to be weaned off vent





4/1/2020


No acute events reported overnight, no fever or chills, remains critically 

stable, discussed with mother at bedside. 





[id number]


Patient seen and examined in the ICU


She is critically ill


Mechanically ventilated


Assist-control/25/450/30 with 8 of PEEP


She is on cefepime daptomycin Flagyl and micafungin GEN for antibiotic coverage


Also getting TPN and CRRT


Sedated with Versed


Getting IV albumin also


She is tachycardic at 112 bpm


Temp max 100.0


White blood count is down from 45,000 35,000 today


Chart reviewed


Discussed with RN











[id number]


Patient seen and examined in the ICU


She remains very critically ill


Going for a CT of the abdomen chest and pelvis


Ventilated : On assist control 40% FiO2


Discussed with RN


Chart reviewed








[id number]


Patient seen and examined in the ICU


She is still on CRRT although we plan to change to hemodialysis soon


Chart reviewed


Discussed with RN


Hemoglobin down to 6.9 (suspect CRRT related , Will let nephrology consider 

transfusion while on dialysis)








[id number]


Patient seen and examined in the ICU


She is mechanically ventilated


Before meals/25/450/30%


Also on CRRT


Very critically ill


Chart reviewed


Discussed with RN











[id number]


Patient seen and examined in the ICU


She is now been transferred to the Covid 19 unit so we can rule out Covid and 

keep her in isolation


Very critically ill


Intubated and  ventilated


Assist control 40%


Chart reviewed


Discussed with RN


Still on CRRT


Getting a chest x-ray now


Very concerned about her prognosis








[id number]


Patient seen and examined in the ICU


She is extremely critically ill


Remains mechanically ventilated with assist control/25/450/40%


Also on continuous renal replacement therapy


Chart reviewed


Discussed with RN


She has TPN hanging


Also on pressors











[id number]


Patient seen and examined in the ICU


She is still requiring CRRT


On the vent with assist control but her FiO2 is down to 40% from yesterday


Slightly better but still very critically ill


Discussed with RN


Chart reviewed








[id number]


Patient seen and examined in the ICU


She remains extremely critically ill


On IV fentanyl IV Versed IV Doxy


On the vent


Assist-control/25/450/70%


She is critically ill


Discussed with RN


Chart reviewed


Patient is currently on CRRT as well








[id number]


Patient seen and examined in the ICU


She had to be intubated this morning


On assist-control 25/450/100% with 10 of PEEP and only satting 87%


She is extremely critically ill


I'm not sure if she will survive


Chart reviewed


Discussed with RN











Ms Tadeo is a 50yo F w/ PMHx HTN, prediabetes who presents the emergency room

complaints of abdominal pain. Patient described off and on 3 days. She states is

constant, described as a squeezing sensation in a band-like distribution. + 

nausea, vomiting.  She denies any fever or diarrhea.  Patient denies any 

abdominal surgical procedures.  She states is worse with movements, car ride.  

Pain initially was upper abdomen however now pretty much generalized.  Last 

bowel movement was 3/15/2020. Nothing makes her pain better.  Patient denies any

shortness of breath.  She does state the pain moves into her chest.  Denies any 

headache or visual changes.


Lipase 64384, , , Bilirubin 1.4.


CT abdomen confirms pancreatic inflammation, peripancreatic fluid and 

inflammatory changes around the pancreas consistent with pancreatitis. 

Cholelithiasis and 1.4cm uterine fibroid as well as possible left salpingitis. 

Admitted for further care


GI, General surgery, ID, Pulm consulted.





3/17: Overnight per report no urine output. Added dilaudid for pain, PICC placed

per IR. Renal US negative.Seen bedside in ICU, given 2L additional NSS and 

albumin infusion. Still hypotensive, started on levophed. Repeat CT abdomen with

necrosis. Updated her fiancee


3/18: Sats are only 87% on nasal cannula oxygen. Dialysis catheter per 

nephrology


3/19: She is now on BiPAP appears more ill, now on dialysis


3/20: Seen on BiPAP. Her mother and another family member are present and seemed

to be good support for her. Currently on dialysis. Appears critically ill


3/21: Overnight Tmax 101.7 , still on BiPAP FiO2 40%, still on low dose Levophed

gtt, TPN initiated. On dialysis





Overnight still febrile. Dialysis today. She wakes up and responds to pain. No 

CP.





Vitals


Vitals





Vital Signs








  Date Time  Temp Pulse Resp B/P (MAP) Pulse Ox O2 Delivery O2 Flow Rate FiO2


 


4/7/20 13:58   19  98 Ventilator  


 


4/7/20 13:28       6.0 


 


4/7/20 10:00  86  107/61 (76)    


 


4/7/20 09:00 97.0       





 97.0       











Physical Exam


Physical Exam


GENERAL: Orally intubated/sedated - generalized anasarca 


HEENT: Pupils equal, ETT, dobhoff +


NECK:  Supple 


LUNGS: Diminished aeration bases 


HEART:  S1, S2, regular 


ABDOMEN:  Distended, hypoactive BS, Rectal tube in place 


: [place]   


EXTREMITIES: Generalized edema, no cyanosis - some mottling, [place] boots & SCDs 

bilaterally 


DERMATOLOGIC:  Warm and dry.  No generalized rash.  


CENTRAL NERVOUS SYSTEM: Sedated 


RIJ Temp HDC, RUE-PICC  


Lt IJ removed 4/7


Chestwall line present


General:  Other (sedated )


Heart:  Other (increased rate)


Lungs:  Other (dimished in BLL)


Abdomen:  Other (distended, tight abdomen )


Extremities:  No edema, Other (SOME CLUBBING )


Skin:  Other (mottling noted to extremities )





Labs


LABS





Laboratory Tests








Test


 4/6/20


19:07 4/7/20


00:27 4/7/20


06:45 4/7/20


06:49


 


Glucose (Fingerstick)


 182 mg/dL


(70-99) 170 mg/dL


(70-99) 


 145 mg/dL


(70-99)


 


White Blood Count


 


 


 11.0 x10^3/uL


(4.0-11.0) 





 


Red Blood Count


 


 


 2.50 x10^6/uL


(3.50-5.40) 





 


Hemoglobin


 


 


 8.0 g/dL


(12.0-15.5) 





 


Hematocrit


 


 


 24.9 %


(36.0-47.0) 





 


Mean Corpuscular Volume   99 fL ()  


 


Mean Corpuscular Hemoglobin   32 pg (25-35)  


 


Mean Corpuscular Hemoglobin


Concent 


 


 32 g/dL


(31-37) 





 


Red Cell Distribution Width


 


 


 18.9 %


(11.5-14.5) 





 


Platelet Count


 


 


 270 x10^3/uL


(140-400) 





 


Sodium Level


 


 


 139 mmol/L


(136-145) 





 


Potassium Level


 


 


 4.6 mmol/L


(3.5-5.1) 





 


Chloride Level


 


 


 104 mmol/L


() 





 


Carbon Dioxide Level


 


 


 25 mmol/L


(21-32) 





 


Anion Gap   10 (6-14)  


 


Blood Urea Nitrogen


 


 


 73 mg/dL


(7-20) 





 


Creatinine


 


 


 2.3 mg/dL


(0.6-1.0) 





 


Estimated GFR


(Cockcroft-Gault) 


 


 22.5 


 





 


Glucose Level


 


 


 155 mg/dL


(70-99) 





 


Calcium Level


 


 


 8.9 mg/dL


(8.5-10.1) 





 


Phosphorus Level


 


 


 5.8 mg/dL


(2.6-4.7) 





 


Albumin


 


 


 2.9 g/dL


(3.4-5.0) 





 


Test


 4/7/20


08:00 


 


 





 


O2 Saturation 96 % (92-99)    


 


Arterial Blood pH


 7.30


(7.35-7.45) 


 


 





 


Arterial Blood pCO2 at


Patient Temp 45 mmHg


(35-46) 


 


 





 


Arterial Blood pO2 at Patient


Temp 95 mmHg


() 


 


 





 


Arterial Blood HCO3


 22 mmol/L


(21-28) 


 


 





 


Arterial Blood Base Excess


 -5 mmol/L


(-3-3) 


 


 





 


FiO2 40    











Assessment and Plan


Assessmemt and Plan


Problems


Medical Problems:


(1) Acute pancreatitis


Status: Acute  





(2) Cholelithiasis


Status: Acute  











Comment


Review of Relevant


I have reviewed the following items josy (where applicable) has been applied.


Labs





Laboratory Tests








Test


 4/5/20


18:09 4/5/20


23:55 4/6/20


05:26 4/6/20


05:45


 


Glucose (Fingerstick)


 133 mg/dL


(70-99) 158 mg/dL


(70-99) 157 mg/dL


(70-99) 





 


Hemoglobin


 


 


 


 7.8 g/dL


(12.0-15.5)


 


Sodium Level


 


 


 


 139 mmol/L


(136-145)


 


Potassium Level


 


 


 


 4.2 mmol/L


(3.5-5.1)


 


Chloride Level


 


 


 


 103 mmol/L


()


 


Carbon Dioxide Level


 


 


 


 25 mmol/L


(21-32)


 


Anion Gap    11 (6-14) 


 


Blood Urea Nitrogen


 


 


 


 48 mg/dL


(7-20)


 


Creatinine


 


 


 


 1.8 mg/dL


(0.6-1.0)


 


Estimated GFR


(Cockcroft-Gault) 


 


 


 29.9 





 


Glucose Level


 


 


 


 167 mg/dL


(70-99)


 


Calcium Level


 


 


 


 8.6 mg/dL


(8.5-10.1)


 


Phosphorus Level


 


 


 


 3.9 mg/dL


(2.6-4.7)


 


Magnesium Level


 


 


 


 1.8 mg/dL


(1.8-2.4)


 


Albumin


 


 


 


 2.6 g/dL


(3.4-5.0)


 


Test


 4/6/20


07:40 4/6/20


11:21 4/6/20


19:07 4/7/20


00:27


 


O2 Saturation 97 % (92-99)    


 


Arterial Blood pH


 7.42


(7.35-7.45) 


 


 





 


Arterial Blood pCO2 at


Patient Temp 37 mmHg


(35-46) 


 


 





 


Arterial Blood pO2 at Patient


Temp 105 mmHg


() 


 


 





 


Arterial Blood HCO3


 24 mmol/L


(21-28) 


 


 





 


Arterial Blood Base Excess


 -1 mmol/L


(-3-3) 


 


 





 


FiO2 40    


 


Glucose (Fingerstick)


 


 169 mg/dL


(70-99) 182 mg/dL


(70-99) 170 mg/dL


(70-99)


 


Test


 4/7/20


06:45 4/7/20


06:49 4/7/20


08:00 





 


White Blood Count


 11.0 x10^3/uL


(4.0-11.0) 


 


 





 


Red Blood Count


 2.50 x10^6/uL


(3.50-5.40) 


 


 





 


Hemoglobin


 8.0 g/dL


(12.0-15.5) 


 


 





 


Hematocrit


 24.9 %


(36.0-47.0) 


 


 





 


Mean Corpuscular Volume 99 fL ()    


 


Mean Corpuscular Hemoglobin 32 pg (25-35)    


 


Mean Corpuscular Hemoglobin


Concent 32 g/dL


(31-37) 


 


 





 


Red Cell Distribution Width


 18.9 %


(11.5-14.5) 


 


 





 


Platelet Count


 270 x10^3/uL


(140-400) 


 


 





 


Sodium Level


 139 mmol/L


(136-145) 


 


 





 


Potassium Level


 4.6 mmol/L


(3.5-5.1) 


 


 





 


Chloride Level


 104 mmol/L


() 


 


 





 


Carbon Dioxide Level


 25 mmol/L


(21-32) 


 


 





 


Anion Gap 10 (6-14)    


 


Blood Urea Nitrogen


 73 mg/dL


(7-20) 


 


 





 


Creatinine


 2.3 mg/dL


(0.6-1.0) 


 


 





 


Estimated GFR


(Cockcroft-Gault) 22.5 


 


 


 





 


Glucose Level


 155 mg/dL


(70-99) 


 


 





 


Calcium Level


 8.9 mg/dL


(8.5-10.1) 


 


 





 


Phosphorus Level


 5.8 mg/dL


(2.6-4.7) 


 


 





 


Albumin


 2.9 g/dL


(3.4-5.0) 


 


 





 


Glucose (Fingerstick)


 


 145 mg/dL


(70-99) 


 





 


O2 Saturation   96 % (92-99)  


 


Arterial Blood pH


 


 


 7.30


(7.35-7.45) 





 


Arterial Blood pCO2 at


Patient Temp 


 


 45 mmHg


(35-46) 





 


Arterial Blood pO2 at Patient


Temp 


 


 95 mmHg


() 





 


Arterial Blood HCO3


 


 


 22 mmol/L


(21-28) 





 


Arterial Blood Base Excess


 


 


 -5 mmol/L


(-3-3) 





 


FiO2   40  








Laboratory Tests








Test


 4/6/20


19:07 4/7/20


00:27 4/7/20


06:45 4/7/20


06:49


 


Glucose (Fingerstick)


 182 mg/dL


(70-99) 170 mg/dL


(70-99) 


 145 mg/dL


(70-99)


 


White Blood Count


 


 


 11.0 x10^3/uL


(4.0-11.0) 





 


Red Blood Count


 


 


 2.50 x10^6/uL


(3.50-5.40) 





 


Hemoglobin


 


 


 8.0 g/dL


(12.0-15.5) 





 


Hematocrit


 


 


 24.9 %


(36.0-47.0) 





 


Mean Corpuscular Volume   99 fL ()  


 


Mean Corpuscular Hemoglobin   32 pg (25-35)  


 


Mean Corpuscular Hemoglobin


Concent 


 


 32 g/dL


(31-37) 





 


Red Cell Distribution Width


 


 


 18.9 %


(11.5-14.5) 





 


Platelet Count


 


 


 270 x10^3/uL


(140-400) 





 


Sodium Level


 


 


 139 mmol/L


(136-145) 





 


Potassium Level


 


 


 4.6 mmol/L


(3.5-5.1) 





 


Chloride Level


 


 


 104 mmol/L


() 





 


Carbon Dioxide Level


 


 


 25 mmol/L


(21-32) 





 


Anion Gap   10 (6-14)  


 


Blood Urea Nitrogen


 


 


 73 mg/dL


(7-20) 





 


Creatinine


 


 


 2.3 mg/dL


(0.6-1.0) 





 


Estimated GFR


(Cockcroft-Gault) 


 


 22.5 


 





 


Glucose Level


 


 


 155 mg/dL


(70-99) 





 


Calcium Level


 


 


 8.9 mg/dL


(8.5-10.1) 





 


Phosphorus Level


 


 


 5.8 mg/dL


(2.6-4.7) 





 


Albumin


 


 


 2.9 g/dL


(3.4-5.0) 





 


Test


 4/7/20


08:00 


 


 





 


O2 Saturation 96 % (92-99)    


 


Arterial Blood pH


 7.30


(7.35-7.45) 


 


 





 


Arterial Blood pCO2 at


Patient Temp 45 mmHg


(35-46) 


 


 





 


Arterial Blood pO2 at Patient


Temp 95 mmHg


() 


 


 





 


Arterial Blood HCO3


 22 mmol/L


(21-28) 


 


 





 


Arterial Blood Base Excess


 -5 mmol/L


(-3-3) 


 


 





 


FiO2 40    








Microbiology


4/5/20 Blood Culture - Preliminary, Resulted


         NO GROWTH AFTER 2 DAYS


4/4/20 Urine Culture - Final, Complete


         


4/4/20 Urine Culture Result 1 ([facility]) - Final, Complete


Medications





Current Medications


Sodium Chloride 1,000 ml @  1,000 mls/hr Q1H IV  Last administered on 3/16/20at 

03:00;  Start 3/16/20 at 03:00;  Stop 3/16/20 at 03:59;  Status DC


Ondansetron HCl (Zofran) 4 mg 1X  ONCE IVP  Last administered on 3/16/20at 

03:27;  Start 3/16/20 at 03:00;  Stop 3/16/20 at 03:01;  Status DC


Morphine Sulfate (Morphine Sulfate) 4 mg 1X  ONCE IV ;  Start 3/16/20 at 03:00; 

Stop 3/16/20 at 03:01;  Status Cancel


Ketorolac Tromethamine (Toradol 30mg Vial) 30 mg 1X  ONCE IV  Last administered 

on 3/16/20at 02:54;  Start 3/16/20 at 03:00;  Stop 3/16/20 at 03:01;  Status DC


Fentanyl Citrate (Fentanyl 2ml Vial) 25 mcg 1X  ONCE IVP  Last administered on 

3/16/20at 03:23;  Start 3/16/20 at 03:30;  Stop 3/16/20 at 03:31;  Status DC


Fentanyl Citrate (Fentanyl 2ml Vial) 100 mcg STK-MED ONCE .ROUTE ;  Start 

3/16/20 at 03:18;  Stop 3/16/20 at 03:18;  Status DC


Iohexol (Omnipaque 350 Mg/ml) 90 ml 1X  ONCE IV  Last administered on 3/16/20at 

03:25;  Start 3/16/20 at 03:30;  Stop 3/16/20 at 03:31;  Status DC


Info (CONTRAST GIVEN -- Rx MONITORING) 1 each PRN DAILY  PRN MC SEE COMMENTS;  

Start 3/16/20 at 03:30;  Stop 3/18/20 at 03:29;  Status DC


Hydromorphone HCl (Dilaudid) 0.5 mg 1X  ONCE IV  Last administered on 3/16/20at 

03:55;  Start 3/16/20 at 04:30;  Stop 3/16/20 at 04:32;  Status DC


Ondansetron HCl (Zofran) 4 mg PRN Q8HRS  PRN IV NAUSEA/VOMITING 1ST CHOICE;  

Start 3/16/20 at 05:00;  Stop 3/16/20 at 09:27;  Status DC


Morphine Sulfate (Morphine Sulfate) 2 mg PRN Q2HR  PRN IV SEVERE PAIN 7-10 Last 

administered on 3/17/20at 12:26;  Start 3/16/20 at 05:00;  Stop 3/17/20 at 

14:15;  Status DC


Sodium Chloride 1,000 ml @  125 mls/hr Q8H IV  Last administered on 3/16/20at 

20:56;  Start 3/16/20 at 05:00;  Stop 3/17/20 at 04:59;  Status DC


Hydromorphone HCl (Dilaudid) 0.5 mg PRN Q3HRS  PRN IV SEVERE PAIN 7-10 Last 

administered on 3/17/20at 10:06;  Start 3/16/20 at 05:00;  Stop 3/17/20 at 

12:01;  Status DC


Piperacillin Sod/ Tazobactam Sod 4.5 gm/Sodium Chloride 100 ml @  200 mls/hr 1X 

ONCE IV  Last administered on 3/16/20at 05:44;  Start 3/16/20 at 06:00;  Stop 

3/16/20 at 06:29;  Status DC


Ondansetron HCl (Zofran) 4 mg PRN Q4HRS  PRN IV NAUSEA/VOMITING 1ST CHOICE Last 

administered on 3/21/20at 16:15;  Start 3/16/20 at 09:30


Insulin Human Lispro (HumaLOG) 0-9 UNITS Q6HRS SQ  Last administered on 4/7/20at

00:30;  Start 3/16/20 at 09:30


Dextrose (Dextrose 50%-Water Syringe) 12.5 gm PRN Q15MIN  PRN IV SEE COMMENTS;  

Start 3/16/20 at 09:30


Pantoprazole Sodium (PROTONIX VIAL for IV PUSH) 40 mg DAILYAC IVP  Last 

administered on 4/7/20at 09:43;  Start 3/16/20 at 11:30


Prochlorperazine Edisylate (Compazine) 10 mg PRN Q6HRS  PRN IV NAUSEA/VOMITING, 

2nd CHOICE Last administered on 3/17/20at 00:42;  Start 3/16/20 at 17:45


Atenolol (Tenormin) 100 mg DAILY PO ;  Start 3/17/20 at 09:00;  Stop 3/16/20 at 

20:08;  Status DC


Metoprolol Tartrate (Lopressor Vial) 2.5 mg Q6HRS IVP  Last administered on 

3/17/20at 05:51;  Start 3/16/20 at 20:15;  Stop 3/17/20 at 10:02;  Status DC


Metoprolol Tartrate (Lopressor Vial) 5 mg Q6HRS IVP  Last administered on 

3/26/20at 00:12;  Start 3/17/20 at 10:15;  Stop 3/28/20 at 08:48;  Status DC


Hydromorphone HCl (Dilaudid) 1 mg PRN Q3HRS  PRN IV SEVERE PAIN 7-10 Last 

administered on 3/23/20at 05:13;  Start 3/17/20 at 12:00;  Stop 3/31/20 at 

00:25;  Status DC


Lidocaine HCl (Buffered Lidocaine 1%) 3 ml STK-MED ONCE .ROUTE ;  Start 3/17/20 

at 12:55;  Stop 3/17/20 at 12:56;  Status DC


Albumin Human 500 ml @  125 mls/hr 1X  ONCE IV  Last administered on 3/17/20at 

14:33;  Start 3/17/20 at 14:30;  Stop 3/17/20 at 18:32;  Status DC


Norepinephrine Bitartrate 8 mg/ Dextrose 258 ml @  17.299 mls/ hr CONT  PRN IV 

PER PROTOCOL Last administered on 4/5/20at 21:18;  Start 3/17/20 at 15:30


Sodium Chloride 1,000 ml @  125 mls/hr Q8H IV  Last administered on 3/17/20at 

21:04;  Start 3/17/20 at 16:00;  Stop 3/18/20 at 02:42;  Status DC


Albumin Human 500 ml @  125 mls/hr PRN BID  PRN IV After every 2L NSS & BP < 

90mm Last administered on 4/1/20at 14:21;  Start 3/17/20 at 16:00


Iohexol (Omnipaque 300 Mg/ml) 60 ml 1X  ONCE IV  Last administered on 3/17/20at 

17:20;  Start 3/17/20 at 17:00;  Stop 3/17/20 at 17:01;  Status DC


Info (CONTRAST GIVEN -- Rx MONITORING) 1 each PRN DAILY  PRN MC SEE COMMENTS;  

Start 3/17/20 at 17:00;  Stop 3/19/20 at 16:59;  Status DC


Meropenem 1 gm/ Sodium Chloride 100 ml @  200 mls/hr Q8HRS IV  Last administered

on 3/18/20at 05:45;  Start 3/17/20 at 20:00;  Stop 3/18/20 at 08:48;  Status DC


Furosemide (Lasix) 40 mg 1X  ONCE IVP  Last administered on 3/17/20at 22:12;  

Start 3/17/20 at 22:30;  Stop 3/17/20 at 22:31;  Status DC


Calcium Chloride 1000 mg/Sodium Chloride 110 ml @  220 mls/hr 1X  ONCE IV  Last 

administered on 3/17/20at 22:11;  Start 3/17/20 at 22:30;  Stop 3/17/20 at 

22:59;  Status DC


Albuterol Sulfate (Ventolin Neb Soln) 2.5 mg 1X  ONCE NEB  Last administered on 

3/18/20at 00:56;  Start 3/17/20 at 22:30;  Stop 3/17/20 at 22:31;  Status DC


Insulin Human Regular (HumuLIN R VIAL) 5 unit 1X  ONCE IV  Last administered on 

3/17/20at 22:14;  Start 3/17/20 at 22:30;  Stop 3/17/20 at 22:31;  Status DC


Magnesium Sulfate 50 ml @ 25 mls/hr 1X  ONCE IV  Last administered on 3/18/20at 

02:57;  Start 3/18/20 at 03:00;  Stop 3/18/20 at 04:59;  Status DC


Calcium Gluconate 1000 mg/Sodium Chloride 110 ml @  220 mls/hr 1X  ONCE IV  Last

administered on 3/18/20at 02:46;  Start 3/18/20 at 03:00;  Stop 3/18/20 at 

03:29;  Status DC


Sodium Chloride 1,000 ml @  200 mls/hr Q5H IV  Last administered on 3/18/20at 

02:46;  Start 3/18/20 at 03:00;  Stop 3/18/20 at 10:21;  Status DC


Calcium Gluconate 1000 mg/Sodium Chloride 110 ml @  220 mls/hr 1X  ONCE IV  Last

administered on 3/18/20at 03:21;  Start 3/18/20 at 03:30;  Stop 3/18/20 at 

03:59;  Status DC


Sodium Bicarbonate 50 meq/Sodium Chloride 1,050 ml @  75 mls/hr Q14H IV  Last ad

ministered on 3/22/20at 21:10;  Start 3/18/20 at 07:30;  Stop 3/23/20 at 10:28; 

Status DC


Calcium Gluconate 2000 mg/Sodium Chloride 120 ml @  220 mls/hr 1X  ONCE IV  Last

administered on 3/18/20at 09:05;  Start 3/18/20 at 07:30;  Stop 3/18/20 at 

08:02;  Status DC


Lidocaine HCl (Xylocaine-Mpf 1% 2ml Vial) 2 ml STK-MED ONCE .ROUTE ;  Start 

3/18/20 at 08:47;  Stop 3/18/20 at 08:47;  Status DC


Meropenem 500 mg/ Sodium Chloride 50 ml @  100 mls/hr Q12HR IV  Last 

administered on 3/23/20at 21:01;  Start 3/18/20 at 18:00;  Stop 3/24/20 at 

07:58;  Status DC


Lidocaine HCl (Buffered Lidocaine 1%) 3 ml STK-MED ONCE .ROUTE ;  Start 3/18/20 

at 09:46;  Stop 3/18/20 at 09:46;  Status DC


Lidocaine HCl (Buffered Lidocaine 1%) 6 ml 1X  ONCE INJ  Last administered on 

3/18/20at 10:26;  Start 3/18/20 at 10:15;  Stop 3/18/20 at 10:16;  Status DC


Info (Tpn Per Pharmacy) 1 each PRN DAILY  PRN MC SEE COMMENTS Last administered 

on 4/7/20at 13:07;  Start 3/18/20 at 12:00


Sodium Chloride 1,000 ml @  1,000 mls/hr Q1H PRN IV hypotension;  Start 3/18/20 

at 12:07;  Stop 3/18/20 at 18:06;  Status DC


Diphenhydramine HCl (Benadryl) 25 mg 1X PRN  PRN IV ITCHING;  Start 3/18/20 at 

12:15;  Stop 3/19/20 at 12:14;  Status DC


Diphenhydramine HCl (Benadryl) 25 mg 1X PRN  PRN IV ITCHING;  Start 3/18/20 at 

12:15;  Stop 3/19/20 at 12:14;  Status DC


Sodium Chloride 1,000 ml @  400 mls/hr Q2H30M PRN IV PATENCY;  Start 3/18/20 at 

12:07;  Stop 3/19/20 at 00:06;  Status DC


Info (PHARMACY MONITORING -- do not chart) 1 each PRN DAILY  PRN MC SEE 

COMMENTS;  Start 3/18/20 at 12:15;  Stop 3/20/20 at 08:13;  Status DC


Sodium Chloride 90 meq/Calcium Gluconate 10 meq/ Multivitamins 10 ml/Chromium/ 

Copper/Manganese/ Seleni/Zn 1 ml/ Total Parenteral Nutrition/Amino 

Acids/Dextrose/ Fat Emulsion Intravenous 55.005 ml  @ 2.292 mls/hr TPN  CONT IV 

;  Start 3/18/20 at 22:00;  Stop 3/18/20 at 12:33;  Status DC


Info (Tpn Per Pharmacy) 1 each PRN DAILY  PRN MC SEE COMMENTS;  Start 3/18/20 at

12:30;  Status UNV


Sodium Chloride 90 meq/Calcium Gluconate 10 meq/ Multivitamins 10 ml/Chromium/ 

Copper/Manganese/ Seleni/Zn 0.5 ml/ Total Parenteral Nutrition/Amino 

Acids/Dextrose/ Fat Emulsion Intravenous 1,512 ml @  63 mls/hr TPN  CONT IV  

Last administered on 3/18/20at 22:06;  Start 3/18/20 at 22:00;  Stop 3/19/20 at 

21:59;  Status DC


Calcium Carbonate/ Glycine (Tums) 500 mg PRN AFTMEALHC  PRN PO INDIGESTION;  

Start 3/18/20 at 17:45


Calcium Gluconate (Calcium Gluconate) 2,000 mg 1X  ONCE IVP  Last administered 

on 3/19/20at 02:19;  Start 3/19/20 at 02:15;  Stop 3/19/20 at 02:16;  Status DC


Calcium Chloride 3000 mg/Sodium Chloride 1,030 ml @  50 mls/hr [id number] IV  Last 

administered on 3/21/20at 02:17;  Start 3/19/20 at 08:00;  Stop 3/21/20 at 

15:23;  Status DC


Lorazepam (Ativan Inj) 1 mg PRN Q4HRS  PRN IVP ANXIETY / AGITATION Last 

administered on 3/23/20at 00:34;  Start 3/19/20 at 09:00


Sodium Chloride 1,000 ml @  1,000 mls/hr Q1H PRN IV hypotension;  Start 3/19/20 

at 08:56;  Stop 3/19/20 at 14:55;  Status DC


Albumin Human 200 ml @  200 mls/hr 1X PRN  PRN IV Hypotension;  Start 3/19/20 at

09:00;  Stop 3/19/20 at 14:59;  Status DC


Diphenhydramine HCl (Benadryl) 25 mg 1X PRN  PRN IV ITCHING;  Start 3/19/20 at 

09:00;  Stop 3/20/20 at 08:59;  Status DC


Diphenhydramine HCl (Benadryl) 25 mg 1X PRN  PRN IV ITCHING;  Start 3/19/20 at 

09:00;  Stop 3/20/20 at 08:59;  Status DC


Sodium Chloride 1,000 ml @  400 mls/hr Q2H30M PRN IV PATENCY;  Start 3/19/20 at 

08:56;  Stop 3/19/20 at 20:55;  Status DC


Info (PHARMACY MONITORING -- do not chart) 1 each PRN DAILY  PRN MC SEE 

COMMENTS;  Start 3/19/20 at 09:00;  Status UNV


Info (PHARMACY MONITORING -- do not chart) 1 each PRN DAILY  PRN MC SEE 

COMMENTS;  Start 3/19/20 at 09:00;  Stop 3/20/20 at 08:13;  Status DC


Digoxin (Lanoxin) 500 mcg 1X  ONCE IV  Last administered on 3/19/20at 10:04;  

Start 3/19/20 at 10:00;  Stop 3/19/20 at 10:01;  Status DC


Digoxin (Lanoxin) 125 mcg 1X  ONCE IV  Last administered on 3/19/20at 17:10;  

Start 3/19/20 at 18:00;  Stop 3/19/20 at 18:01;  Status DC


Magnesium Sulfate 100 ml @  25 mls/hr 1X  ONCE IV  Last administered on 

3/19/20at 12:48;  Start 3/19/20 at 13:00;  Stop 3/19/20 at 16:59;  Status DC


Sodium Chloride 90 meq/Magnesium Sulfate 10 meq/ Calcium Gluconate 20 meq/ 

Multivitamins 10 ml/Chromium/ Copper/Manganese/ Seleni/Zn 0.5 ml/ Total 

Parenteral Nutrition/Amino Acids/Dextrose/ Fat Emulsion Intravenous 1,512 ml @  

63 mls/hr TPN  CONT IV  Last administered on 3/19/20at 22:25;  Start 3/19/20 at 

22:00;  Stop 3/20/20 at 21:59;  Status DC


Sodium Chloride 1,000 ml @  1,000 mls/hr Q1H PRN IV hypotension;  Start 3/20/20 

at 08:05;  Stop 3/20/20 at 14:04;  Status DC


Albumin Human 200 ml @  200 mls/hr 1X  ONCE IV  Last administered on 3/20/20at 

08:57;  Start 3/20/20 at 08:15;  Stop 3/20/20 at 09:14;  Status DC


Diphenhydramine HCl (Benadryl) 25 mg 1X PRN  PRN IV ITCHING;  Start 3/20/20 at 

08:15;  Stop 3/21/20 at 08:14;  Status DC


Diphenhydramine HCl (Benadryl) 25 mg 1X PRN  PRN IV ITCHING;  Start 3/20/20 at 

08:15;  Stop 3/21/20 at 08:14;  Status DC


Sodium Chloride 1,000 ml @  400 mls/hr Q2H30M PRN IV PATENCY;  Start 3/20/20 at 

08:05;  Stop 3/20/20 at 20:04;  Status DC


Info (PHARMACY MONITORING -- do not chart) 1 each PRN DAILY  PRN MC SEE 

COMMENTS;  Start 3/20/20 at 08:15;  Stop 3/24/20 at 07:57;  Status DC


Sodium Chloride 90 meq/Potassium Chloride 15 meq/ Potassium Phosphate 10 mmol/ 

Magnesium Sulfate 10 meq/Calcium Gluconate 20 meq/ Multivitamins 10 ml/Chromium/

Copper/Manganese/ Seleni/Zn 0.5 ml/ Total Parenteral Nutrition/Amino 

Acids/Dextrose/ Fat Emulsion Intravenous 1,512 ml @  63 mls/hr TPN  CONT IV  

Last administered on 3/20/20at 21:01;  Start 3/20/20 at 22:00;  Stop 3/21/20 at 

21:59;  Status DC


Potassium Chloride/Water 100 ml @  100 mls/hr 1X  ONCE IV  Last administered on 

3/20/20at 14:09;  Start 3/20/20 at 14:00;  Stop 3/20/20 at 14:59;  Status DC


Benzocaine (Hurricaine One) 1 spray 1X  ONCE MM  Last administered on 3/20/20at 

16:38;  Start 3/20/20 at 14:30;  Stop 3/20/20 at 14:31;  Status DC


Lidocaine HCl (Glydo (Lidocaine) Jelly) 1 thomas 1X  ONCE MM  Last administered on 

3/20/20at 16:38;  Start 3/20/20 at 14:30;  Stop 3/20/20 at 14:31;  Status DC


Linezolid/Dextrose 300 ml @  300 mls/hr Q12HR IV  Last administered on 3/26/20at

21:04;  Start 3/20/20 at 20:00;  Stop 3/27/20 at 07:50;  Status DC


Acetaminophen (Tylenol) 650 mg PRN Q6HRS  PRN PO MILD PAIN / TEMP;  Start 

3/21/20 at 03:30;  Stop 3/21/20 at 03:36;  Status DC


Acetaminophen (Tylenol) 650 mg PRN Q6HRS  PRN PEG MILD PAIN / TEMP Last 

administered on 3/26/20at 07:35;  Start 3/21/20 at 03:36


Sodium Chloride 1,000 ml @  1,000 mls/hr Q1H PRN IV hypotension;  Start 3/21/20 

at 07:50;  Stop 3/21/20 at 13:49;  Status DC


Albumin Human 200 ml @  200 mls/hr 1X PRN  PRN IV Hypotension;  Start 3/21/20 at

08:00;  Stop 3/21/20 at 13:59;  Status DC


Sodium Chloride (Normal Saline Flush) 10 ml 1X PRN  PRN IV AP catheter pack;  

Start 3/21/20 at 08:00;  Stop 3/22/20 at 07:59;  Status DC


Sodium Chloride (Normal Saline Flush) 10 ml 1X PRN  PRN IV  catheter pack;  

Start 3/21/20 at 08:00;  Stop 3/22/20 at 07:59;  Status DC


Sodium Chloride 1,000 ml @  400 mls/hr Q2H30M PRN IV PATENCY;  Start 3/21/20 at 

07:50;  Stop 3/21/20 at 19:49;  Status DC


Info (PHARMACY MONITORING -- do not chart) 1 each PRN DAILY  PRN MC SEE 

COMMENTS;  Start 3/21/20 at 08:00;  Status UNV


Info (PHARMACY MONITORING -- do not chart) 1 each PRN DAILY  PRN MC SEE 

COMMENTS;  Start 3/21/20 at 08:00;  Stop 3/23/20 at 08:25;  Status DC


Sodium Chloride 90 meq/Potassium Chloride 15 meq/ Potassium Phosphate 10 mmol/ 

Magnesium Sulfate 10 meq/Calcium Gluconate 20 meq/ Multivitamins 10 ml/Chromium/

Copper/Manganese/ Seleni/Zn 0.5 ml/ Total Parenteral Nutrition/Amino 

Acids/Dextrose/ Fat Emulsion Intravenous 1,512 ml @  63 mls/hr TPN  CONT IV  

Last administered on 3/21/20at 20:57;  Start 3/21/20 at 22:00;  Stop 3/22/20 at 

21:59;  Status DC


Sodium Chloride 90 meq/Potassium Chloride 15 meq/ Potassium Phosphate 15 mmol/ 

Magnesium Sulfate 10 meq/Calcium Gluconate 20 meq/ Multivitamins 10 ml/Chromium/

Copper/Manganese/ Seleni/Zn 0.5 ml/ Total Parenteral Nutrition/Amino 

Acids/Dextrose/ Fat Emulsion Intravenous 1,512 ml @  63 mls/hr TPN  CONT IV ;  

Start 3/22/20 at 22:00;  Stop 3/22/20 at 14:16;  Status DC


Sodium Chloride 90 meq/Potassium Chloride 15 meq/ Potassium Phosphate 15 mmol/ 

Magnesium Sulfate 10 meq/Calcium Gluconate 20 meq/ Multivitamins 10 ml/Chromium/

Copper/Manganese/ Seleni/Zn 0.5 ml/ Total Parenteral Nutrition/Amino 

Acids/Dextrose/ Fat Emulsion Intravenous 1,200 ml @  50 mls/hr TPN  CONT IV ;  

Start 3/22/20 at 22:00;  Stop 3/22/20 at 14:17;  Status DC


Sodium Chloride 90 meq/Potassium Chloride 15 meq/ Potassium Phosphate 10 mmol/ 

Magnesium Sulfate 10 meq/Calcium Gluconate 20 meq/ Multivitamins 10 ml/Chromium/

Copper/Manganese/ Seleni/Zn 0.5 ml/ Total Parenteral Nutrition/Amino Acids/D

extrose/ Fat Emulsion Intravenous 1,200 ml @  50 mls/hr TPN  CONT IV  Last 

administered on 3/22/20at 23:29;  Start 3/22/20 at 22:00;  Stop 3/23/20 at 

21:59;  Status DC


Sodium Chloride 1,000 ml @  1,000 mls/hr Q1H PRN IV hypotension;  Start 3/23/20 

at 07:28;  Stop 3/23/20 at 13:27;  Status DC


Albumin Human 200 ml @  200 mls/hr 1X  ONCE IV  Last administered on 3/23/20at 

08:51;  Start 3/23/20 at 07:30;  Stop 3/23/20 at 08:29;  Status DC


Diphenhydramine HCl (Benadryl) 25 mg 1X PRN  PRN IV ITCHING;  Start 3/23/20 at 

07:30;  Stop 3/24/20 at 07:29;  Status DC


Diphenhydramine HCl (Benadryl) 25 mg 1X PRN  PRN IV ITCHING;  Start 3/23/20 at 0

7:30;  Stop 3/24/20 at 07:29;  Status DC


Sodium Chloride 1,000 ml @  400 mls/hr Q2H30M PRN IV PATENCY;  Start 3/23/20 at 

07:28;  Stop 3/23/20 at 19:27;  Status DC


Info (PHARMACY MONITORING -- do not chart) 1 each PRN DAILY  PRN MC SEE 

COMMENTS;  Start 3/23/20 at 07:30;  Stop 4/3/20 at 13:01;  Status DC


Metronidazole 100 ml @  100 mls/hr Q6HRS IV  Last administered on 4/7/20at 09:44

;  Start 3/23/20 at 08:30


Micafungin Sodium 100 mg/Dextrose 100 ml @  100 mls/hr Q24H IV  Last 

administered on 4/7/20at 10:00;  Start 3/23/20 at 09:00


Propofol 0 ml @ As Directed STK-MED ONCE IV ;  Start 3/23/20 at 07:53;  Stop 

3/23/20 at 07:53;  Status DC


Etomidate (Amidate) 20 mg STK-MED ONCE IV ;  Start 3/23/20 at 07:53;  Stop 

3/23/20 at 07:54;  Status DC


Midazolam HCl (Versed) 5 mg STK-MED ONCE .ROUTE ;  Start 3/23/20 at 07:57;  Stop

3/23/20 at 07:57;  Status DC


Fentanyl Citrate 30 ml @ 0 mls/hr CONT  PRN IV SEE PROTOCOL Last administered on

4/7/20at 13:28;  Start 3/23/20 at 08:15


Artificial Tears (Artificial Tears) 1 drop PRN Q1HR  PRN OU DRY EYE, 1st choice;

 Start 3/23/20 at 08:15


Midazolam HCl 50 mg/Sodium Chloride 50 ml @ 0 mls/hr CONT  PRN IV SEE PROTOCOL 

Last administered on 3/26/20at 22:39;  Start 3/23/20 at 08:15;  Stop 3/28/20 at 

15:59;  Status DC


Etomidate (Amidate) 8 mg 1X  ONCE IV  Last administered on 3/23/20at 08:33;  

Start 3/23/20 at 08:30;  Stop 3/23/20 at 08:31;  Status DC


Succinylcholine Chloride (Anectine) 120 mg 1X  ONCE IV  Last administered on 

3/23/20at 08:34;  Start 3/23/20 at 08:30;  Stop 3/23/20 at 08:31;  Status DC


Midazolam HCl (Versed) 5 mg 1X  ONCE IV ;  Start 3/23/20 at 08:30;  Stop 3/23/20

at 08:31;  Status DC


Potassium Chloride 15 meq/ Bicarbonate Dialysis Soln w/ out KCl 5,007.5 ml  @ 

1,000 mls/ hr Q5H1M IV  Last administered on 3/24/20at 11:11;  Start 3/23/20 at 

12:00;  Stop 3/24/20 at 11:15;  Status DC


Potassium Chloride 15 meq/ Bicarbonate Dialysis Soln w/ out KCl 5,007.5 ml  @ 

1,000 mls/ hr Q5H1M IV  Last administered on 3/24/20at 11:12;  Start 3/23/20 at 

12:00;  Stop 3/24/20 at 11:17;  Status DC


Potassium Chloride 15 meq/ Bicarbonate Dialysis Soln w/ out KCl 5,007.5 ml  @ 

1,000 mls/ hr Q5H1M IV  Last administered on 3/24/20at 11:11;  Start 3/23/20 at 

12:00;  Stop 3/24/20 at 11:19;  Status DC


Sodium Chloride 90 meq/Potassium Chloride 15 meq/ Potassium Phosphate 10 mmol/ 

Magnesium Sulfate 10 meq/Calcium Gluconate 20 meq/ Multivitamins 10 ml/Chromium/

Copper/Manganese/ Seleni/Zn 0.5 ml/ Total Parenteral Nutrition/Amino 

Acids/Dextrose/ Fat Emulsion Intravenous 1,400 ml @  58.333 mls/ hr TPN  CONT IV

 Last administered on 3/23/20at 21:42;  Start 3/23/20 at 22:00;  Stop 3/24/20 at

21:59;  Status DC


Heparin Sodium (Porcine) (Heparin Sodium) 5,000 unit Q8HRS SQ  Last administered

on 3/28/20at 05:55;  Start 3/23/20 at 15:00;  Stop 3/28/20 at 13:28;  Status DC


Meropenem 500 mg/ Sodium Chloride 50 ml @  100 mls/hr Q6HRS IV  Last 

administered on 3/25/20at 06:00;  Start 3/24/20 at 09:00;  Stop 3/25/20 at 

07:29;  Status DC


Potassium Phosphate 20 mmol/ Sodium Chloride 106.6667 ml @  51.667 m... 1X  ONCE

IV  Last administered on 3/24/20at 11:22;  Start 3/24/20 at 10:15;  Stop 3/24/20

at 12:18;  Status DC


Acetaminophen (Tylenol Supp) 650 mg PRN Q6HRS  PRN OR MILD PAIN / TEMP Last 

administered on 3/24/20at 10:37;  Start 3/24/20 at 10:30


Potassium Chloride/Water 100 ml @  100 mls/hr Q1H IV  Last administered on 

3/24/20at 12:12;  Start 3/24/20 at 11:00;  Stop 3/24/20 at 12:59;  Status DC


Potassium Chloride 20 meq/ Bicarbonate Dialysis Soln w/ out KCl 5,010 ml @  

1,000 mls/hr Q5H1M IV  Last administered on 3/25/20at 08:48;  Start 3/24/20 at 

12:00;  Stop 3/25/20 at 13:03;  Status DC


Potassium Chloride 20 meq/ Bicarbonate Dialysis Soln w/ out KCl 5,010 ml @  

1,000 mls/hr Q5H1M IV  Last administered on 3/29/20at 14:52;  Start 3/24/20 at 

11:30;  Stop 3/29/20 at 19:59;  Status DC


Potassium Chloride 20 meq/ Bicarbonate Dialysis Soln w/ out KCl 5,010 ml @  

1,000 mls/hr Q5H1M IV  Last administered on 3/29/20at 14:53;  Start 3/24/20 at 

11:30;  Stop 3/29/20 at 19:59;  Status DC


Sodium Chloride 90 meq/Potassium Chloride 15 meq/ Potassium Phosphate 15 mmol/ 

Magnesium Sulfate 10 meq/Calcium Gluconate 15 meq/ Multivitamins 10 ml/Chromium/

Copper/Manganese/ Seleni/Zn 0.5 ml/ Total Parenteral Nutrition/Amino 

Acids/Dextrose/ Fat Emulsion Intravenous 1,400 ml @  58.333 mls/ hr TPN  CONT IV

 Last administered on 3/24/20at 22:17;  Start 3/24/20 at 22:00;  Stop 3/25/20 at

21:59;  Status DC


Cefepime HCl (Maxipime) 2 gm Q12HR IVP  Last administered on 4/7/20at 09:43;  

Start 3/25/20 at 09:00


Daptomycin 500 mg/ Sodium Chloride 50 ml @  100 mls/hr Q48H IV  Last 

administered on 4/6/20at 08:35;  Start 3/25/20 at 08:30


Lidocaine HCl (Buffered Lidocaine 1%) 3 ml 1X  ONCE INJ  Last administered on 

3/25/20at 10:27;  Start 3/25/20 at 10:30;  Stop 3/25/20 at 10:31;  Status DC


Potassium Phosphate 20 mmol/ Sodium Chloride 106.6667 ml @  51.667 m... 1X  ONCE

IV  Last administered on 3/25/20at 12:51;  Start 3/25/20 at 13:00;  Stop 3/25/20

at 15:03;  Status DC


Sodium Chloride 90 meq/Potassium Chloride 15 meq/ Potassium Phosphate 18 mmol/ 

Magnesium Sulfate 8 meq/Calcium Gluconate 15 meq/ Multivitamins 10 ml/Chromium/ 

Copper/Manganese/ Seleni/Zn 0.5 ml/ Total Parenteral Nutrition/Amino Acids/

Dextrose/ Fat Emulsion Intravenous 1,400 ml @  58.333 mls/ hr TPN  CONT IV  Last

administered on 3/25/20at 22:16;  Start 3/25/20 at 22:00;  Stop 3/26/20 at 

21:59;  Status DC


Potassium Chloride 20 meq/ Bicarbonate Dialysis Soln w/ out KCl 5,010 ml @  

1,000 mls/hr Q5H1M IV  Last administered on 3/29/20at 14:54;  Start 3/25/20 at 

16:00;  Stop 3/29/20 at 19:59;  Status DC


Multi-Ingred Cream/Lotion/Oil/ Oint (Artificial Tears Eye Ointment) 1 thomas PRN 

Q1HR  PRN OU DRY EYE, 2nd choice Last administered on 4/3/20at 11:05;  Start 

3/25/20 at 17:30


Sodium Chloride 90 meq/Potassium Chloride 15 meq/ Potassium Phosphate 18 mmol/ 

Magnesium Sulfate 8 meq/Calcium Gluconate 15 meq/ Multivitamins 10 ml/Chromium/ 

Copper/Manganese/ Seleni/Zn 0.5 ml/ Total Parenteral Nutrition/Amino 

Acids/Dextrose/ Fat Emulsion Intravenous 1,400 ml @  58.333 mls/ hr TPN  CONT IV

 Last administered on 3/26/20at 22:00;  Start 3/26/20 at 22:00;  Stop 3/27/20 at

21:59;  Status DC


Albumin Human 500 ml @  125 mls/hr 1X  ONCE IV ;  Start 3/26/20 at 14:15;  Stop 

3/26/20 at 18:14;  Status DC


Sodium Chloride 90 meq/Potassium Chloride 15 meq/ Potassium Phosphate 18 mmol/ 

Magnesium Sulfate 8 meq/Calcium Gluconate 15 meq/ Multivitamins 10 ml/Chromium/ 

Copper/Manganese/ Seleni/Zn 0.5 ml/ Insulin Human Regular 10 unit/ Total 

Parenteral Nutrition/Amino Acids/Dextrose/ Fat Emulsion Intravenous 1,400 ml @  

58.333 mls/ hr TPN  CONT IV  Last administered on 3/27/20at 21:43;  Start 

3/27/20 at 22:00;  Stop 3/28/20 at 21:59;  Status DC


Lidocaine HCl (Buffered Lidocaine 1%) 3 ml STK-MED ONCE .ROUTE ;  Start 3/25/20 

at 10:00;  Stop 3/27/20 at 13:57;  Status DC


Midazolam HCl 100 mg/Sodium Chloride 100 ml @ 7 mls/hr CONT  PRN IV SEE PROTOCOL

Last administered on 4/7/20at 13:26;  Start 3/28/20 at 16:00


Sodium Chloride 90 meq/Potassium Chloride 15 meq/ Potassium Phosphate 18 mmol/ 

Magnesium Sulfate 8 meq/Calcium Gluconate 15 meq/ Multivitamins 10 ml/Chromium/ 

Copper/Manganese/ Seleni/Zn 0.5 ml/ Insulin Human Regular 15 unit/ Total 

Parenteral Nutrition/Amino Acids/Dextrose/ Fat Emulsion Intravenous 1,400 ml @  

58.333 mls/ hr TPN  CONT IV  Last administered on 3/28/20at 20:34;  Start 

3/28/20 at 22:00;  Stop 3/29/20 at 21:59;  Status DC


Info ([facility] Electrolyte Protocol) 1 ea CONT PRN  PRN MC PER PROTOCOL;  Start 

3/29/20 at 13:15


Sodium Chloride 90 meq/Potassium Chloride 15 meq/ Potassium Phosphate 18 mmol/ 

Magnesium Sulfate 8 meq/Calcium Gluconate 15 meq/ Multivitamins 10 ml/Chromium/ 

Copper/Manganese/ Seleni/Zn 0.5 ml/ Insulin Human Regular 15 unit/ Total 

Parenteral Nutrition/Amino Acids/Dextrose/ Fat Emulsion Intravenous 1,400 ml @  

58.333 mls/ hr TPN  CONT IV  Last administered on 3/29/20at 22:05;  Start 3/29/

20 at 22:00;  Stop 3/30/20 at 21:59;  Status DC


Potassium Chloride 15 meq/ Bicarbonate Dialysis Soln w/ out KCl 5,007.5 ml  @ 1,

000 mls/ hr Q5H1M IV  Last administered on 4/1/20at 18:14;  Start 3/29/20 at 

20:00;  Stop 4/2/20 at 13:08;  Status DC


Potassium Chloride 15 meq/ Bicarbonate Dialysis Soln w/ out KCl 5,007.5 ml  @ 

1,000 mls/ hr Q5H1M IV  Last administered on 4/1/20at 18:14;  Start 3/29/20 at 

20:00;  Stop 4/2/20 at 13:08;  Status DC


Potassium Chloride 15 meq/ Bicarbonate Dialysis Soln w/ out KCl 5,007.5 ml  @ 

1,000 mls/ hr Q5H1M IV  Last administered on 4/1/20at 18:14;  Start 3/29/20 at 

20:00;  Stop 4/2/20 at 13:08;  Status DC


Iohexol (Omnipaque 240 Mg/ml) 30 ml 1X  ONCE PO  Last administered on 3/30/20at 

11:30;  Start 3/30/20 at 11:30;  Stop 3/30/20 at 11:33;  Status DC


Info (CONTRAST GIVEN -- Rx MONITORING) 1 each PRN DAILY  PRN MC SEE COMMENTS;  

Start 3/30/20 at 11:45;  Stop 4/1/20 at 11:44;  Status DC


Sodium Chloride 90 meq/Potassium Chloride 15 meq/ Potassium Phosphate 18 mmol/ 

Magnesium Sulfate 8 meq/Calcium Gluconate 15 meq/ Multivitamins 10 ml/Chromium/ 

Copper/Manganese/ Seleni/Zn 0.5 ml/ Insulin Human Regular 15 unit/ Total 

Parenteral Nutrition/Amino Acids/Dextrose/ Fat Emulsion Intravenous 1,400 ml @  

58.333 mls/ hr TPN  CONT IV  Last administered on 3/30/20at 21:47;  Start 

3/30/20 at 22:00;  Stop 3/31/20 at 21:59;  Status DC


Sodium Chloride 90 meq/Potassium Chloride 15 meq/ Potassium Phosphate 18 mmol/ 

Magnesium Sulfate 8 meq/Calcium Gluconate 15 meq/ Multivitamins 10 ml/Chromium/ 

Copper/Manganese/ Seleni/Zn 0.5 ml/ Insulin Human Regular 20 unit/ Total 

Parenteral Nutrition/Amino Acids/Dextrose/ Fat Emulsion Intravenous 1,400 ml @  

58.333 mls/ hr TPN  CONT IV  Last administered on 3/31/20at 21:36;  Start 

3/31/20 at 22:00;  Stop 4/1/20 at 21:59;  Status DC


Alteplase, Recombinant (Cathflo For Central Catheter Clearance) 1 mg 1X  ONCE 

INT CAT  Last administered on 3/31/20at 20:03;  Start 3/31/20 at 19:30;  Stop 

3/31/20 at 19:46;  Status DC


Alteplase, Recombinant (Cathflo For Central Catheter Clearance) 1 mg 1X  ONCE 

INT CAT  Last administered on 3/31/20at 22:05;  Start 3/31/20 at 22:00;  Stop 

3/31/20 at 22:01;  Status DC


Sodium Chloride 90 meq/Potassium Chloride 15 meq/ Potassium Phosphate 18 mmol/ 

Magnesium Sulfate 8 meq/Calcium Gluconate 15 meq/ Multivitamins 10 ml/Chromium/ 

Copper/Manganese/ Seleni/Zn 0.5 ml/ Insulin Human Regular 20 unit/ Total 

Parenteral Nutrition/Amino Acids/Dextrose/ Fat Emulsion Intravenous 1,400 ml @  

58.333 mls/ hr TPN  CONT IV  Last administered on 4/1/20at 21:30;  Start 4/1/20 

at 22:00;  Stop 4/2/20 at 21:59;  Status DC


Dexmedetomidine HCl 400 mcg/ Sodium Chloride 100 ml @ 0 mls/hr CONT  PRN IV ANX

IETY / AGITATION Last administered on 4/7/20at 13:25;  Start 4/2/20 at 08:15


Sodium Chloride 500 ml @  500 mls/hr 1X PRN  PRN IV ELEVATED BP, SEE COMMENTS;  

Start 4/2/20 at 08:15


Atropine Sulfate (ATROPINE 0.5mg SYRINGE) 0.5 mg PRN Q5MIN  PRN IV SEE COMMENTS;

 Start 4/2/20 at 08:15


Furosemide (Lasix) 20 mg 1X  ONCE IVP  Last administered on 4/2/20at 08:19;  

Start 4/2/20 at 08:15;  Stop 4/2/20 at 08:16;  Status DC


Lidocaine HCl (Buffered Lidocaine 1%) 3 ml STK-MED ONCE .ROUTE ;  Start 4/2/20 

at 08:39;  Stop 4/2/20 at 08:39;  Status DC


Lidocaine HCl (Buffered Lidocaine 1%) 6 ml 1X  ONCE INJ  Last administered on 

4/2/20at 09:05;  Start 4/2/20 at 09:00;  Stop 4/2/20 at 09:06;  Status DC


Sodium Chloride 90 meq/Potassium Chloride 15 meq/ Potassium Phosphate 18 mmol/ 

Magnesium Sulfate 8 meq/Calcium Gluconate 15 meq/ Multivitamins 10 ml/Chromium/ 

Copper/Manganese/ Seleni/Zn 0.5 ml/ Insulin Human Regular 20 unit/ Total 

Parenteral Nutrition/Amino Acids/Dextrose/ Fat Emulsion Intravenous 1,400 ml @  

58.333 mls/ hr TPN  CONT IV  Last administered on 4/2/20at 22:45;  Start 4/2/20 

at 22:00;  Stop 4/3/20 at 21:59;  Status DC


Sodium Chloride 1,000 ml @  1,000 mls/hr Q1H PRN IV hypotension;  Start 4/3/20 

at 07:30;  Stop 4/3/20 at 13:29;  Status DC


Albumin Human 200 ml @  200 mls/hr 1X PRN  PRN IV Hypotension Last administered 

on 4/3/20at 09:36;  Start 4/3/20 at 07:30;  Stop 4/3/20 at 13:29;  Status DC


Sodium Chloride (Normal Saline Flush) 10 ml 1X PRN  PRN IV AP catheter pack;  

Start 4/3/20 at 07:30;  Stop 4/3/20 at 21:29;  Status DC


Sodium Chloride (Normal Saline Flush) 10 ml 1X PRN  PRN IV  catheter pack;  

Start 4/3/20 at 07:30;  Stop 4/4/20 at 07:29;  Status DC


Sodium Chloride 1,000 ml @  400 mls/hr Q2H30M PRN IV PATENCY;  Start 4/3/20 at 

07:30;  Stop 4/3/20 at 19:29;  Status DC


Info (PHARMACY MONITORING -- do not chart) 1 each PRN DAILY  PRN MC SEE 

COMMENTS;  Start 4/3/20 at 07:30;  Stop 4/3/20 at 13:02;  Status DC


Info (PHARMACY MONITORING -- do not chart) 1 each PRN DAILY  PRN MC SEE 

COMMENTS;  Start 4/3/20 at 07:30;  Stop 4/5/20 at 12:45;  Status DC


Sodium Chloride 90 meq/Potassium Chloride 15 meq/ Potassium Phosphate 10 mmol/ 

Magnesium Sulfate 8 meq/Calcium Gluconate 15 meq/ Multivitamins 10 ml/Chromium/ 

Copper/Manganese/ Seleni/Zn 0.5 ml/ Insulin Human Regular 25 unit/ Total 

Parenteral Nutrition/Amino Acids/Dextrose/ Fat Emulsion Intravenous 1,400 ml @  

58.333 mls/ hr TPN  CONT IV  Last administered on 4/3/20at 22:19;  Start 4/3/20 

at 22:00;  Stop 4/4/20 at 21:59;  Status DC


Heparin Sodium (Porcine) (Heparin Sodium) 5,000 unit Q12HR SQ  Last administered

on 4/7/20at 09:47;  Start 4/3/20 at 21:00


Ondansetron HCl (Zofran) 4 mg PRN Q6HRS  PRN IV NAUSEA/VOMITING;  Start 4/6/20 

at 07:00;  Stop 4/7/20 at 06:59;  Status DC


Fentanyl Citrate (Fentanyl 2ml Vial) 25 mcg PRN Q5MIN  PRN IV MILD PAIN 1-3;  

Start 4/6/20 at 07:00;  Stop 4/7/20 at 06:59;  Status DC


Fentanyl Citrate (Fentanyl 2ml Vial) 50 mcg PRN Q5MIN  PRN IV MODERATE TO SEVERE

PAIN;  Start 4/6/20 at 07:00;  Stop 4/7/20 at 06:59;  Status DC


Ringer's Solution 1,000 ml @  30 mls/hr Q24H IV ;  Start 4/6/20 at 07:00;  Stop 

4/6/20 at 18:59;  Status DC


Lidocaine HCl (Xylocaine-Mpf 1% 2ml Vial) 2 ml PRN 1X  PRN ID PRIOR TO IV START;

 Start 4/6/20 at 07:00;  Stop 4/7/20 at 06:59;  Status DC


Prochlorperazine Edisylate (Compazine) 5 mg PACU PRN  PRN IV NAUSEA, MRX1;  

Start 4/6/20 at 07:00;  Stop 4/7/20 at 06:59;  Status DC


Sodium Chloride 1,000 ml @  1,000 mls/hr Q1H PRN IV hypotension;  Start 4/4/20 

at 09:10;  Stop 4/4/20 at 15:09;  Status DC


Albumin Human 200 ml @  200 mls/hr 1X PRN  PRN IV Hypotension Last administered 

on 4/4/20at 10:10;  Start 4/4/20 at 09:15;  Stop 4/4/20 at 15:14;  Status DC


Sodium Chloride 1,000 ml @  400 mls/hr Q2H30M PRN IV PATENCY;  Start 4/4/20 at 

09:10;  Stop 4/4/20 at 21:09;  Status DC


Info (PHARMACY MONITORING -- do not chart) 1 each PRN DAILY  PRN MC SEE 

COMMENTS;  Start 4/4/20 at 09:15;  Stop 4/5/20 at 12:45;  Status DC


Info (PHARMACY MONITORING -- do not chart) 1 each PRN DAILY  PRN MC SEE 

COMMENTS;  Start 4/4/20 at 09:15;  Stop 4/5/20 at 12:45;  Status DC


Sodium Chloride 90 meq/Potassium Chloride 15 meq/ Potassium Phosphate 10 mmol/ 

Magnesium Sulfate 8 meq/Calcium Gluconate 15 meq/ Multivitamins 10 ml/Chromium/ 

Copper/Manganese/ Seleni/Zn 0.5 ml/ Insulin Human Regular 25 unit/ Total 

Parenteral Nutrition/Amino Acids/Dextrose/ Fat Emulsion Intravenous 1,400 ml @  

58.333 mls/ hr TPN  CONT IV  Last administered on 4/4/20at 22:10;  Start 4/4/20 

at 22:00;  Stop 4/5/20 at 21:59;  Status DC


Magnesium Sulfate 50 ml @ 25 mls/hr PRN DAILY  PRN IV for Mag < 1.7 on am labs; 

Start 4/5/20 at 09:15


Sodium Chloride 90 meq/Potassium Chloride 15 meq/ Potassium Phosphate 10 mmol/ 

Magnesium Sulfate 8 meq/Calcium Gluconate 15 meq/ Multivitamins 10 ml/Chromium/ 

Copper/Manganese/ Seleni/Zn 0.5 ml/ Insulin Human Regular 25 unit/ Total 

Parenteral Nutrition/Amino Acids/Dextrose/ Fat Emulsion Intravenous 1,400 ml @  

58.333 mls/ hr TPN  CONT IV  Last administered on 4/5/20at 21:20;  Start 4/5/20 

at 22:00;  Stop 4/6/20 at 21:59;  Status DC


Sodium Chloride 1,000 ml @  1,000 mls/hr Q1H PRN IV hypotension;  Start 4/5/20 

at 12:23;  Stop 4/5/20 at 18:22;  Status DC


Albumin Human 200 ml @  200 mls/hr 1X  ONCE IV  Last administered on 4/5/20at 

13:34;  Start 4/5/20 at 12:30;  Stop 4/5/20 at 13:29;  Status DC


Diphenhydramine HCl (Benadryl) 25 mg 1X PRN  PRN IV ITCHING;  Start 4/5/20 at 

12:30;  Stop 4/6/20 at 12:29;  Status DC


Diphenhydramine HCl (Benadryl) 25 mg 1X PRN  PRN IV ITCHING;  Start 4/5/20 at 

12:30;  Stop 4/6/20 at 12:29;  Status DC


Info (PHARMACY MONITORING -- do not chart) 1 each PRN DAILY  PRN MC SEE 

COMMENTS;  Start 4/5/20 at 12:30;  Status Cancel


Bupivacaine HCl/ Epinephrine Bitart (Sensorcain-Epi 0.5%-1:925932 Mpf) 30 ml 

STK-MED ONCE .ROUTE  Last administered on 4/6/20at 11:44;  Start 4/6/20 at 

11:00;  Stop 4/6/20 at 11:01;  Status DC


Cellulose (Surgicel Fibrillar 1x2) 1 each STK-MED ONCE .ROUTE ;  Start 4/6/20 at

11:00;  Stop 4/6/20 at 11:01;  Status DC


Sodium Chloride 90 meq/Potassium Chloride 15 meq/ Potassium Phosphate 10 mmol/ 

Magnesium Sulfate 12 meq/Calcium Gluconate 15 meq/ Multivitamins 10 ml/Chromium/

Copper/Manganese/ Seleni/Zn 0.5 ml/ Insulin Human Regular 25 unit/ Total 

Parenteral Nutrition/Amino Acids/Dextrose/ Fat Emulsion Intravenous 1,400 ml @  

58.333 mls/ hr TPN  CONT IV  Last administered on 4/6/20at 22:24;  Start 4/6/20 

at 22:00;  Stop 4/7/20 at 21:59


Propofol 20 ml @ As Directed STK-MED ONCE IV ;  Start 4/6/20 at 11:07;  Stop 

4/6/20 at 11:07;  Status DC


Cellulose (Surgicel Hemostat 4x8) 1 each STK-MED ONCE .ROUTE  Last administered 

on 4/6/20at 11:44;  Start 4/6/20 at 11:55;  Stop 4/6/20 at 11:56;  Status DC


Sevoflurane (Ultane) 60 ml STK-MED ONCE IH ;  Start 4/6/20 at 12:46;  Stop 

4/6/20 at 12:46;  Status DC


Sodium Chloride 1,000 ml @  1,000 mls/hr Q1H PRN IV hypotension;  Start 4/6/20 

at 13:51;  Stop 4/6/20 at 19:50;  Status DC


Albumin Human 200 ml @  200 mls/hr 1X PRN  PRN IV Hypotension Last administered 

on 4/6/20at 14:51;  Start 4/6/20 at 14:00;  Stop 4/6/20 at 19:59;  Status DC


Diphenhydramine HCl (Benadryl) 25 mg 1X PRN  PRN IV ITCHING;  Start 4/6/20 at 

14:00;  Stop 4/7/20 at 13:59;  Status DC


Diphenhydramine HCl (Benadryl) 25 mg 1X PRN  PRN IV ITCHING;  Start 4/6/20 at 

14:00;  Stop 4/7/20 at 13:59;  Status DC


Sodium Chloride 1,000 ml @  400 mls/hr Q2H30M PRN IV PATENCY;  Start 4/6/20 at 

13:51;  Stop 4/7/20 at 01:50;  Status DC


Info (PHARMACY MONITORING -- do not chart) 1 each PRN DAILY  PRN MC SEE 

COMMENTS;  Start 4/6/20 at 14:00


Heparin Sodium (Porcine) (Hep Lock Adult) 500 unit STK-MED ONCE IVP ;  Start 

4/7/20 at 09:29;  Stop 4/7/20 at 09:30;  Status DC


Sodium Chloride 1,000 ml @  1,000 mls/hr Q1H PRN IV hypotension;  Start 4/7/20 

at 10:43;  Stop 4/7/20 at 16:42


Sodium Chloride 1,000 ml @  400 mls/hr Q2H30M PRN IV PATENCY;  Start 4/7/20 at 

10:43;  Stop 4/7/20 at 22:42


Info (PHARMACY MONITORING -- do not chart) 1 each PRN DAILY  PRN MC SEE 

COMMENTS;  Start 4/7/20 at 10:45;  Status UNV


Info (PHARMACY MONITORING -- do not chart) 1 each PRN DAILY  PRN MC SEE 

COMMENTS;  Start 4/7/20 at 10:45;  Status UNV


Sodium Chloride 90 meq/Potassium Chloride 15 meq/ Magnesium Sulfate 12 

meq/Calcium Gluconate 15 meq/ Multivitamins 10 ml/Chromium/ Copper/Manganese/ 

Seleni/Zn 0.5 ml/ Insulin Human Regular 25 unit/ Total Parenteral 

Nutrition/Amino Acids/Dextrose/ Fat Emulsion Intravenous 1,400 ml @  58.333 mls/

hr TPN  CONT IV ;  Start 4/7/20 at 22:00;  Stop 4/8/20 at 21:59





Active Scripts


Active


Reported


Bisoprolol Fumarate 5 Mg Tablet 10 Mg PO DAILY


Vitals/I & O





Vital Sign - Last 24 Hours








 4/6/20 4/6/20 4/6/20 4/6/20





 18:11 18:45 19:00 19:10


 


Temp   97.3 





   97.3 


 


Pulse  81 85 


 


Resp  18 18 


 


B/P (MAP)  188/82 (117) 120/73 (89) 


 


Pulse Ox  100 100 99


 


O2 Delivery Mechanical Ventilator Ventilator Ventilator Ventilator


 


    





    





 4/6/20 4/6/20 4/6/20 4/6/20





 20:00 20:00 20:51 21:00


 


Temp  97.7  





  97.7  


 


Pulse  82  82


 


Resp  18  18


 


B/P (MAP)  123/77 (92)  117/73 (88)


 


Pulse Ox  99 100 99


 


O2 Delivery Mechanical Ventilator Ventilator Ventilator Ventilator


 


    





    





 4/6/20 4/6/20 4/7/20 4/7/20





 22:00 23:00 00:00 00:00


 


Temp   97.4 





   97.4 


 


Pulse 80 80 80 


 


Resp 18 18 18 


 


B/P (MAP) 97/61 (73) 109/67 (81) 106/65 (79) 


 


Pulse Ox 99 99 99 


 


O2 Delivery Ventilator Ventilator Ventilator Mechanical Ventilator


 


    





    





 4/7/20 4/7/20 4/7/20 4/7/20





 00:20 01:00 02:00 03:00


 


Pulse  82 82 82


 


Resp  18 18 18


 


B/P (MAP)  103/57 (72) 105/61 (76) 104/65 (78)


 


Pulse Ox 99 99 99 99


 


O2 Delivery Ventilator Ventilator Ventilator Ventilator





 4/7/20 4/7/20 4/7/20 4/7/20





 03:30 04:00 04:00 05:00


 


Temp  97.4  





  97.4  


 


Pulse  84  84


 


Resp  18  18


 


B/P (MAP)  106/65 (79)  104/62 (76)


 


Pulse Ox 99 99  99


 


O2 Delivery Ventilator Ventilator Mechanical Ventilator Ventilator


 


    





    





 4/7/20 4/7/20 4/7/20 4/7/20





 06:00 07:00 07:49 08:00


 


Pulse 82 86  86


 


Resp 18 18  18


 


B/P (MAP) 104/64 (77) 112/68 (83)  117/71 (86)


 


Pulse Ox 99 98 99 98


 


O2 Delivery Ventilator Ventilator Ventilator Ventilator





 4/7/20 4/7/20 4/7/20 4/7/20





 08:00 09:00 10:00 11:49


 


Temp  97.0  





  97.0  


 


Pulse  84 86 


 


Resp  18 18 


 


B/P (MAP)  99/65 (76) 107/61 (76) 


 


Pulse Ox  97 97 98


 


O2 Delivery Mechanical Ventilator Ventilator Ventilator Ventilator


 


    





    





 4/7/20 4/7/20 4/7/20 





 12:00 13:28 13:58 


 


Resp   19 


 


Pulse Ox  98 98 


 


O2 Delivery Mechanical Ventilator Ventilator Ventilator 


 


O2 Flow Rate  6.0  














Intake and Output   


 


 4/6/20 4/6/20 4/7/20





 15:00 23:00 07:00


 


Intake Total 250 ml 714.5 ml 814 ml


 


Output Total 67 ml 170 ml 325 ml


 


Balance 183 ml 544.5 ml 489 ml











Hemodynamically unstable?:  No


Is patient in severe pain?:  No


Is NPO status required?:  Yes











HIRAM BARRERA MD              Apr 7, 2020 16:13

## 2020-04-07 NOTE — PDOC
SURGICAL PROGRESS NOTE


Subjective


d/w nursing


no issues with trach


stable overnight


Vital Signs





Vital Signs








  Date Time  Temp Pulse Resp B/P (MAP) Pulse Ox O2 Delivery O2 Flow Rate FiO2


 


4/7/20 07:49     99 Ventilator  


 


4/7/20 07:00  86 18 112/68 (83)    


 


4/7/20 04:00 97.4       





 97.4       








I&O











Intake and Output 


 


 4/7/20





 07:00


 


Intake Total 1778.5 ml


 


Output Total 562 ml


 


Balance 1216.5 ml


 


 


 


Intake Oral 0 ml


 


IV Total 1778.5 ml


 


Output Urine Total 462 ml


 


Stool Total 0 ml


 


Gastric Drainage Total 100 ml








PATIENT HAS A VILLASENOR:  Yes


General:  Other (sedated )


HEENT:  Other (trach intact )


Abdomen:  Other (distended, tight abdomen )


Labs





Laboratory Tests








Test


 4/5/20


11:44 4/5/20


18:09 4/5/20


23:55 4/6/20


05:26


 


Glucose (Fingerstick)


 162 mg/dL


(70-99) 133 mg/dL


(70-99) 158 mg/dL


(70-99) 157 mg/dL


(70-99)


 


Test


 4/6/20


05:45 4/6/20


07:40 4/6/20


11:21 4/6/20


19:07


 


Hemoglobin


 7.8 g/dL


(12.0-15.5) 


 


 





 


Sodium Level


 139 mmol/L


(136-145) 


 


 





 


Potassium Level


 4.2 mmol/L


(3.5-5.1) 


 


 





 


Chloride Level


 103 mmol/L


() 


 


 





 


Carbon Dioxide Level


 25 mmol/L


(21-32) 


 


 





 


Anion Gap 11 (6-14)    


 


Blood Urea Nitrogen


 48 mg/dL


(7-20) 


 


 





 


Creatinine


 1.8 mg/dL


(0.6-1.0) 


 


 





 


Estimated GFR


(Cockcroft-Gault) 29.9 


 


 


 





 


Glucose Level


 167 mg/dL


(70-99) 


 


 





 


Calcium Level


 8.6 mg/dL


(8.5-10.1) 


 


 





 


Phosphorus Level


 3.9 mg/dL


(2.6-4.7) 


 


 





 


Magnesium Level


 1.8 mg/dL


(1.8-2.4) 


 


 





 


Albumin


 2.6 g/dL


(3.4-5.0) 


 


 





 


O2 Saturation  97 % (92-99)   


 


Arterial Blood pH


 


 7.42


(7.35-7.45) 


 





 


Arterial Blood pCO2 at


Patient Temp 


 37 mmHg


(35-46) 


 





 


Arterial Blood pO2 at Patient


Temp 


 105 mmHg


() 


 





 


Arterial Blood HCO3


 


 24 mmol/L


(21-28) 


 





 


Arterial Blood Base Excess


 


 -1 mmol/L


(-3-3) 


 





 


FiO2  40   


 


Glucose (Fingerstick)


 


 


 169 mg/dL


(70-99) 182 mg/dL


(70-99)


 


Test


 4/7/20


00:27 4/7/20


06:45 4/7/20


06:49 4/7/20


08:00


 


Glucose (Fingerstick)


 170 mg/dL


(70-99) 


 145 mg/dL


(70-99) 





 


White Blood Count


 


 11.0 x10^3/uL


(4.0-11.0) 


 





 


Red Blood Count


 


 2.50 x10^6/uL


(3.50-5.40) 


 





 


Hemoglobin


 


 8.0 g/dL


(12.0-15.5) 


 





 


Hematocrit


 


 24.9 %


(36.0-47.0) 


 





 


Mean Corpuscular Volume  99 fL ()   


 


Mean Corpuscular Hemoglobin  32 pg (25-35)   


 


Mean Corpuscular Hemoglobin


Concent 


 32 g/dL


(31-37) 


 





 


Red Cell Distribution Width


 


 18.9 %


(11.5-14.5) 


 





 


Platelet Count


 


 270 x10^3/uL


(140-400) 


 





 


Sodium Level


 


 139 mmol/L


(136-145) 


 





 


Potassium Level


 


 4.6 mmol/L


(3.5-5.1) 


 





 


Chloride Level


 


 104 mmol/L


() 


 





 


Carbon Dioxide Level


 


 25 mmol/L


(21-32) 


 





 


Anion Gap  10 (6-14)   


 


Blood Urea Nitrogen


 


 73 mg/dL


(7-20) 


 





 


Creatinine


 


 2.3 mg/dL


(0.6-1.0) 


 





 


Estimated GFR


(Cockcroft-Gault) 


 22.5 


 


 





 


Glucose Level


 


 155 mg/dL


(70-99) 


 





 


Calcium Level


 


 8.9 mg/dL


(8.5-10.1) 


 





 


Phosphorus Level


 


 5.8 mg/dL


(2.6-4.7) 


 





 


Albumin


 


 2.9 g/dL


(3.4-5.0) 


 





 


O2 Saturation    96 % (92-99) 


 


Arterial Blood pH


 


 


 


 7.30


(7.35-7.45)


 


Arterial Blood pCO2 at


Patient Temp 


 


 


 45 mmHg


(35-46)


 


Arterial Blood pO2 at Patient


Temp 


 


 


 95 mmHg


()


 


Arterial Blood HCO3


 


 


 


 22 mmol/L


(21-28)


 


Arterial Blood Base Excess


 


 


 


 -5 mmol/L


(-3-3)


 


FiO2    40 








Laboratory Tests








Test


 4/6/20


11:21 4/6/20


19:07 4/7/20


00:27 4/7/20


06:45


 


Glucose (Fingerstick)


 169 mg/dL


(70-99) 182 mg/dL


(70-99) 170 mg/dL


(70-99) 





 


White Blood Count


 


 


 


 11.0 x10^3/uL


(4.0-11.0)


 


Red Blood Count


 


 


 


 2.50 x10^6/uL


(3.50-5.40)


 


Hemoglobin


 


 


 


 8.0 g/dL


(12.0-15.5)


 


Hematocrit


 


 


 


 24.9 %


(36.0-47.0)


 


Mean Corpuscular Volume    99 fL () 


 


Mean Corpuscular Hemoglobin    32 pg (25-35) 


 


Mean Corpuscular Hemoglobin


Concent 


 


 


 32 g/dL


(31-37)


 


Red Cell Distribution Width


 


 


 


 18.9 %


(11.5-14.5)


 


Platelet Count


 


 


 


 270 x10^3/uL


(140-400)


 


Sodium Level


 


 


 


 139 mmol/L


(136-145)


 


Potassium Level


 


 


 


 4.6 mmol/L


(3.5-5.1)


 


Chloride Level


 


 


 


 104 mmol/L


()


 


Carbon Dioxide Level


 


 


 


 25 mmol/L


(21-32)


 


Anion Gap    10 (6-14) 


 


Blood Urea Nitrogen


 


 


 


 73 mg/dL


(7-20)


 


Creatinine


 


 


 


 2.3 mg/dL


(0.6-1.0)


 


Estimated GFR


(Cockcroft-Gault) 


 


 


 22.5 





 


Glucose Level


 


 


 


 155 mg/dL


(70-99)


 


Calcium Level


 


 


 


 8.9 mg/dL


(8.5-10.1)


 


Phosphorus Level


 


 


 


 5.8 mg/dL


(2.6-4.7)


 


Albumin


 


 


 


 2.9 g/dL


(3.4-5.0)


 


Test


 4/7/20


06:49 4/7/20


08:00 


 





 


Glucose (Fingerstick)


 145 mg/dL


(70-99) 


 


 





 


O2 Saturation  96 % (92-99)   


 


Arterial Blood pH


 


 7.30


(7.35-7.45) 


 





 


Arterial Blood pCO2 at


Patient Temp 


 45 mmHg


(35-46) 


 





 


Arterial Blood pO2 at Patient


Temp 


 95 mmHg


() 


 





 


Arterial Blood HCO3


 


 22 mmol/L


(21-28) 


 





 


Arterial Blood Base Excess


 


 -5 mmol/L


(-3-3) 


 





 


FiO2  40   








Problem List


Problems


Medical Problems:


(1) Acute pancreatitis


Status: Acute  





(2) Cholelithiasis


Status: Acute  








Assessment/Plan


supportive care











SAURAV NASH APRN             Apr 7, 2020 09:39

## 2020-04-07 NOTE — NUR
Pharmacy TPN Dosing Note



S: JESENIA RICH is a 49 year old F Currently receiving Central Continuous TPN started 
03/18/20



B:Pertinent PMH: Necrotizing pancreatitis

Height: 5 feet, 8 inches

Weight: 105.8 kg



Current diet: NPO 



LABS:

Sodium:    139 

Potassium: 4.6 

Chloride:  104 

Calcium:   8.9 

Corrected Calcium: 9.78 

Magnesium: 1.8 

CO2:       25 

SCr:       2.3 

Glucose:   145-182 

Albumin:   2.9 

AST:       47 

ALT:       20 



TPN FORMULA:

TPN TYPE:  Central Continuous

AMINO ACIDS:         125 gm

DEXTROSE:            195 gm

LIPIDS:              40 gm



SODIUM CHLORIDE:     90 mEq

POTASSIUM CHLORIDE:  15 mEq

MAGNESIUM:           12 mEq

CALCIUM:             15 mEq

INSULIN:             25 units

MULTIPLE VITAMIN:    10 ml

TRACE ELEMENTS:      0.5 ml(s)



TPN PLAN:  

Kphos removed from TPN per AM lab results

Renal panel ordered for am per MD





R: Continue TPN 

Will monitor electrolytes, glucose, and tolerance to TPN.



 Maribell Vazquez RUTHANN, 04/07/20 [id number]

## 2020-04-07 NOTE — PDOC
PROGRESS NOTES


Assessment


Assessment


Respiratory failure.


Seizure.


Metabolic encephalopathy.


Fever 102.7 degree.


Metabolic acidosis.


Diffuse pulmonary infiltrate.


Pleural effusion.


Pancreatitis


Gallstone.


Leukocytosis.


Electrolytes imbalances.


Hyperglycemia.


DM.


HTN.


HLD.


Anemia.


Abnormal CXR.


Obesity.





RECOMMENDATIONS/PLAN:


Continue life support in PACU at the present time.


Keppra if has further seizures.


Treat medical diseases.


OT/PT.





EEG last week: No seizure activity.





OBJECTIVE:


4/6/20: No seizures reported over night.





Past Medical History


Cardiovascular:  HTN, Hyperlipidemia


Endocrine:  Diabetes





Family History


Unobtainable.





Social HistoryU


not obtainable





Allergies


Coded Allergies:  


Codeine (Verified  Allergy, Intermediate, rash, 3/16/20)





ROS


Unobtainable in unresponsive state.





PHYSICAL EXAMINATION:   


General appearance in sub acute distress.  


HEENT:  Normocephalic and nontraumatic.  Eyes, nose, ears, and throat are 

unremarkable.


Neck is supple. No lymphadenopathy. 


Cardiovascular:  S1, S2.  


Pulmonary:  On vent.. 


Abdomen:  Bowel sounds are weak.  


Extremities:  No rash, lesions. Edema noted in hands.





NEUROLOGICAL  EXAMINATION:


Minimal responsiveness.


Extubated.


Not oriented to time, place and person.


PERRL.


EOMI not active.


CN: no acute focal findings.


Muscle tone: Decreased.


Muscle strength: No movements to stimuli.


DTR: 1


Plantar reflex: No response bilaterally 


Gait: not able to walk.


Sensory exam: no response to stimuli..


Not able to access cerebellar signs.


F-T-N test not performed due to unresponsiveness





Objective


Objective





Vital Signs








  Date Time  Temp Pulse Resp B/P (MAP) Pulse Ox O2 Delivery O2 Flow Rate FiO2


 


4/7/20 13:28     98 Ventilator 6.0 


 


4/7/20 10:00  86 18 107/61 (76)    


 


4/7/20 09:00 97.0       





 97.0       














Intake and Output 


 


 4/7/20





 07:00


 


Intake Total 1778.5 ml


 


Output Total 562 ml


 


Balance 1216.5 ml


 


 


 


Intake Oral 0 ml


 


IV Total 1778.5 ml


 


Output Urine Total 462 ml


 


Stool Total 0 ml


 


Gastric Drainage Total 100 ml











Vitals Signs


Vitals





                          VS - Last 72 Hours, by Label








  Date Time  Temp Pulse Resp B/P (MAP) Pulse Ox O2 Delivery O2 Flow Rate FiO2


 


4/7/20 13:28     98 Ventilator 6.0 


 


4/7/20 12:00      Mechanical Ventilator  


 


4/7/20 11:49     98 Ventilator  


 


4/7/20 10:00  86 18 107/61 (76) 97 Ventilator  


 


4/7/20 09:00 97.0 84 18 99/65 (76) 97 Ventilator  





 97.0       


 


4/7/20 08:00      Mechanical Ventilator  


 


4/7/20 08:00  86 18 117/71 (86) 98 Ventilator  


 


4/7/20 07:49     99 Ventilator  


 


4/7/20 07:00  86 18 112/68 (83) 98 Ventilator  


 


4/7/20 06:00  82 18 104/64 (77) 99 Ventilator  


 


4/7/20 05:00  84 18 104/62 (76) 99 Ventilator  


 


4/7/20 04:00      Mechanical Ventilator  


 


4/7/20 04:00 97.4 84 18 106/65 (79) 99 Ventilator  





 97.4       


 


4/7/20 03:30     99 Ventilator  


 


4/7/20 03:00  82 18 104/65 (78) 99 Ventilator  


 


4/7/20 02:00  82 18 105/61 (76) 99 Ventilator  


 


4/7/20 01:00  82 18 103/57 (72) 99 Ventilator  


 


4/7/20 00:20     99 Ventilator  


 


4/7/20 00:00      Mechanical Ventilator  


 


4/7/20 00:00 97.4 80 18 106/65 (79) 99 Ventilator  





 97.4       


 


4/6/20 23:00  80 18 109/67 (81) 99 Ventilator  


 


4/6/20 22:00  80 18 97/61 (73) 99 Ventilator  


 


4/6/20 21:00  82 18 117/73 (88) 99 Ventilator  


 


4/6/20 20:51     100 Ventilator  


 


4/6/20 20:00 97.7 82 18 123/77 (92) 99 Ventilator  





 97.7       


 


4/6/20 20:00      Mechanical Ventilator  


 


4/6/20 19:10     99 Ventilator  


 


4/6/20 19:00 97.3 85 18 120/73 (89) 100 Ventilator  





 97.3       


 


4/6/20 18:45  81 18 188/82 (117) 100 Ventilator  


 


4/6/20 18:11      Mechanical Ventilator  


 


4/6/20 16:00      Mechanical Ventilator  


 


4/6/20 15:51     100 Ventilator  


 


4/6/20 14:10   18  97 Ventilator  


 


4/6/20 14:00  81 18 112/73 (86) 97 Ventilator  


 


4/6/20 13:40   19  96 Ventilator  


 


4/6/20 13:15  86 19 112/71 (85) 96   


 


4/6/20 13:00  82 18 82/60 (67) 94 Ventilator  


 


4/6/20 12:45  84 19 73/49 (57) 94   


 


4/6/20 12:30      Mechanical Ventilator  


 


4/6/20 12:30 97.9 86 18 83/51 (62) 99 Ventilator  





 97.9       


 


4/6/20 12:30     100 Ventilator  


 


4/6/20 11:00 97.9 76 18 103/65 (78) 99 Ventilator  





 97.9       


 


4/6/20 10:00  76 18 93/67 (76) 99 Ventilator  


 


4/6/20 09:00  77 18 95/72 (80) 99 Ventilator  


 


4/6/20 08:00  76 18 100/58 (72) 99 Ventilator  


 


4/6/20 07:48      Mechanical Ventilator  


 


4/6/20 07:39     100 Ventilator  


 


4/6/20 07:00 97.7 77 18 104/65 (78) 100 Ventilator  





 97.7       











Laboratory


Laboratory





Laboratory Tests








Test


 4/6/20


19:07 4/7/20


00:27 4/7/20


06:45 4/7/20


06:49


 


Glucose (Fingerstick)


 182 mg/dL


(70-99) 170 mg/dL


(70-99) 


 145 mg/dL


(70-99)


 


White Blood Count


 


 


 11.0 x10^3/uL


(4.0-11.0) 





 


Red Blood Count


 


 


 2.50 x10^6/uL


(3.50-5.40) 





 


Hemoglobin


 


 


 8.0 g/dL


(12.0-15.5) 





 


Hematocrit


 


 


 24.9 %


(36.0-47.0) 





 


Mean Corpuscular Volume   99 fL ()  


 


Mean Corpuscular Hemoglobin   32 pg (25-35)  


 


Mean Corpuscular Hemoglobin


Concent 


 


 32 g/dL


(31-37) 





 


Red Cell Distribution Width


 


 


 18.9 %


(11.5-14.5) 





 


Platelet Count


 


 


 270 x10^3/uL


(140-400) 





 


Sodium Level


 


 


 139 mmol/L


(136-145) 





 


Potassium Level


 


 


 4.6 mmol/L


(3.5-5.1) 





 


Chloride Level


 


 


 104 mmol/L


() 





 


Carbon Dioxide Level


 


 


 25 mmol/L


(21-32) 





 


Anion Gap   10 (6-14)  


 


Blood Urea Nitrogen


 


 


 73 mg/dL


(7-20) 





 


Creatinine


 


 


 2.3 mg/dL


(0.6-1.0) 





 


Estimated GFR


(Cockcroft-Gault) 


 


 22.5 


 





 


Glucose Level


 


 


 155 mg/dL


(70-99) 





 


Calcium Level


 


 


 8.9 mg/dL


(8.5-10.1) 





 


Phosphorus Level


 


 


 5.8 mg/dL


(2.6-4.7) 





 


Albumin


 


 


 2.9 g/dL


(3.4-5.0) 





 


Test


 4/7/20


08:00 


 


 





 


O2 Saturation 96 % (92-99)    


 


Arterial Blood pH


 7.30


(7.35-7.45) 


 


 





 


Arterial Blood pCO2 at


Patient Temp 45 mmHg


(35-46) 


 


 





 


Arterial Blood pO2 at Patient


Temp 95 mmHg


() 


 


 





 


Arterial Blood HCO3


 22 mmol/L


(21-28) 


 


 





 


Arterial Blood Base Excess


 -5 mmol/L


(-3-3) 


 


 





 


FiO2 40    








Microbiology


4/5/20 Blood Culture - Preliminary, Resulted


         NO GROWTH AFTER 2 DAYS


4/4/20 Urine Culture - Final, Complete


         


4/4/20 Urine Culture Result 1 ([facility]) - Final, Complete





Medication


Medications





Current Medications


Heparin Sodium (Porcine) (Hep Lock Adult) 500 unit STK-MED ONCE IVP ;  Start 

4/7/20 at 09:29;  Stop 4/7/20 at 09:30;  Status DC


Info (PHARMACY MONITORING -- do not chart) 1 each PRN DAILY  PRN MC SEE 

COMMENTS;  Start 4/7/20 at 10:45;  Status UNV


Info (PHARMACY MONITORING -- do not chart) 1 each PRN DAILY  PRN MC SEE 

COMMENTS;  Start 4/7/20 at 10:45;  Status UNV


Sodium Chloride 1,000 ml @  400 mls/hr Q2H30M PRN IV PATENCY;  Start 4/7/20 at 

10:43;  Stop 4/7/20 at 22:42


Sodium Chloride 1,000 ml @  1,000 mls/hr Q1H PRN IV hypotension;  Start 4/7/20 

at 10:43;  Stop 4/7/20 at 16:42


Sodium Chloride 90 meq/Potassium Chloride 15 meq/ Magnesium Sulfate 12 meq/C

alcium Gluconate 15 meq/ Multivitamins 10 ml/Chromium/ Copper/Manganese/ 

Seleni/Zn 0.5 ml/ Insulin Human Regular 25 unit/ Total Parenteral 

Nutrition/Amino Acids/Dextrose/ Fat Emulsion Intravenous 1,400 ml @  58.333 mls/

hr TPN  CONT IV ;  Start 4/7/20 at 22:00;  Stop 4/8/20 at 21:59


Sodium Chloride 90 meq/Potassium Chloride 15 meq/ Potassium Phosphate 10 mmol/ 

Magnesium Sulfate 12 meq/Calcium Gluconate 15 meq/ Multivitamins 10 ml/Chromium/

Copper/Manganese/ Seleni/Zn 0.5 ml/ Insulin Human Regular 25 unit/ Total 

Parenteral Nutrition/Amino Acids/Dextrose/ Fat Emulsion Intravenous 1,400 ml @  

58.333 mls/ hr TPN  CONT IV  Last administered on 4/6/20at 22:24;  Start 4/6/20 

at 22:00;  Stop 4/7/20 at 21:59





Comment


Review of Relevant


I have reviewed the following items josy (where applicable) has been applied.











DORYS LANDA MD                  Apr 7, 2020 14:30

## 2020-04-07 NOTE — PDOC
SUBJECTIVE


ROS


Stable, tolerated UF of 4 Lts on 4/6 


Seen on HD





OBJECTIVE


Vital Signs





Vital Signs








  Date Time  Temp Pulse Resp B/P (MAP) Pulse Ox O2 Delivery O2 Flow Rate FiO2


 


4/7/20 07:49     99 Ventilator  


 


4/7/20 07:00  86 18 112/68 (83)    


 


4/7/20 04:00 97.4       





 97.4       








I & 0











Intake and Output 


 


 4/7/20





 07:00


 


Intake Total 1778.5 ml


 


Output Total 562 ml


 


Balance 1216.5 ml


 


 


 


Intake Oral 0 ml


 


IV Total 1778.5 ml


 


Output Urine Total 462 ml


 


Stool Total 0 ml


 


Gastric Drainage Total 100 ml











PHYSICAL EXAM


Physical Exam


GENERAL: On MV  


HEENT om moist  


NECK: Trach +  


LUNGS: Diminished aeration bases 


HEART:  S1, S2, regular 


ABDOMEN:  Distended, hypoactive BS, Rectal tube in place 


: [place]   


EXTREMITIES: Generalized edema,  some mottling


DERMATOLOGIC:   No generalized rash.  


CENTRAL NERVOUS SYSTEM: Sedated 


RIJ Temp HDC





DIAGNOSIS/ASSESSMENT


Assessment & Plan


ARF-- ATN,    Off CRRT 


Yesterday IUF  with 4 lts off  


 HD today, discussed and reviewed orders with Ml  


Will schedule for TTS for now, monitor for Renal recovery and Volume status  


 


Anemia- per primary  


Currently on YOLI 





Anasarca due to 3rd spacing 





Hyperkalemia- resolved  





AC Panreatitis, early developing necrosis





Cholelithiasis





Acute hypoxic resp failure ,bilateral pleural effusion


On MV  





hypocalcemia - stable 





HTN- Hypotensive , pressors as indicated





COMMENT/RELEVANT DATA


Meds





Current Medications








 Medications


  (Trade)  Dose


 Ordered  Sig/Yee  Start Time


 Stop Time Status Last Admin


Dose Admin


 


 Acetaminophen


  (Tylenol Supp)  650 mg  PRN Q6HRS  PRN  3/24/20 10:30


    3/24/20 10:37


650 MG


 


 Acetaminophen


  (Tylenol)  650 mg  PRN Q6HRS  PRN  3/21/20 03:36


    3/26/20 07:35


650 MG


 


 Albumin Human  200 ml @ 


 200 mls/hr  1X PRN  PRN  4/6/20 14:00


 4/6/20 19:59 DC 4/6/20 14:51


200 MLS/HR


 


 Albuterol Sulfate


  (Ventolin Neb


 Soln)  2.5 mg  1X  ONCE  3/17/20 22:30


 3/17/20 22:31 DC 3/18/20 00:56


2.5 MG


 


 Alteplase,


 Recombinant


  (Cathflo For


 Central Catheter


 Clearance)  1 mg  1X  ONCE  3/31/20 22:00


 3/31/20 22:01 DC 3/31/20 22:05


1 MG


 


 Artificial Tears


  (Artificial


 Tears)  1 drop  PRN Q1HR  PRN  3/23/20 08:15


     





 


 Atenolol


  (Tenormin)  100 mg  DAILY  3/17/20 09:00


 3/16/20 20:08 DC  





 


 Atropine Sulfate


  (ATROPINE 0.5mg


 SYRINGE)  0.5 mg  PRN Q5MIN  PRN  4/2/20 08:15


     





 


 Benzocaine


  (Hurricaine One)  1 spray  1X  ONCE  3/20/20 14:30


 3/20/20 14:31 DC 3/20/20 16:38


1 SPRAY


 


 Bupivacaine HCl/


 Epinephrine Bitart


  (Sensorcain-Epi


 0.5%-1:381343 Mpf)  30 ml  STK-MED ONCE  4/6/20 11:00


 4/6/20 11:01 DC 4/6/20 11:44


1 ML


 


 Calcium Carbonate/


 Glycine


  (Tums)  500 mg  PRN [id number]  PRN  3/18/20 17:45


     





 


 Calcium Chloride


 1000 mg/Sodium


 Chloride  110 ml @ 


 220 mls/hr  1X  ONCE  3/17/20 22:30


 3/17/20 22:59 DC 3/17/20 22:11


220 MLS/HR


 


 Calcium Chloride


 3000 mg/Sodium


 Chloride  1,030 ml @ 


 50 mls/hr  [id number]  3/19/20 08:00


 3/21/20 15:23 DC 3/21/20 02:17


50 MLS/HR


 


 Calcium Gluconate


  (Calcium


 Gluconate)  2,000 mg  1X  ONCE  3/19/20 02:15


 3/19/20 02:16 DC 3/19/20 02:19


2,000 MG


 


 Calcium Gluconate


 1000 mg/Sodium


 Chloride  110 ml @ 


 220 mls/hr  1X  ONCE  3/18/20 03:30


 3/18/20 03:59 DC 3/18/20 03:21


220 MLS/HR


 


 Calcium Gluconate


 2000 mg/Sodium


 Chloride  120 ml @ 


 220 mls/hr  1X  ONCE  3/18/20 07:30


 3/18/20 08:02 DC 3/18/20 09:05


220 MLS/HR


 


 Cefepime HCl


  (Maxipime)  2 gm  Q12HR  3/25/20 09:00


    4/6/20 21:47


2 GM


 


 Cellulose


  (Surgicel


 Fibrillar 1x2)  1 each  STK-MED ONCE  4/6/20 11:00


 4/6/20 11:01 DC  





 


 Cellulose


  (Surgicel


 Hemostat 4x8)  1 each  STK-MED ONCE  4/6/20 11:55


 4/6/20 11:56 DC 4/6/20 11:44


1 EACH


 


 Daptomycin 500 mg/


 Sodium Chloride  50 ml @ 


 100 mls/hr  Q48H  3/25/20 08:30


    4/6/20 08:35


100 MLS/HR


 


 Dexmedetomidine


 HCl 400 mcg/


 Sodium Chloride  100 ml @ 0


 mls/hr  CONT  PRN  4/2/20 08:15


    4/7/20 06:53


27.8 MLS/HR


 


 Dextrose


  (Dextrose


 50%-Water Syringe)  12.5 gm  PRN Q15MIN  PRN  3/16/20 09:30


     





 


 Digoxin


  (Lanoxin)  125 mcg  1X  ONCE  3/19/20 18:00


 3/19/20 18:01 DC 3/19/20 17:10


125 MCG


 


 Diphenhydramine


 HCl


  (Benadryl)  25 mg  1X PRN  PRN  4/6/20 14:00


 4/7/20 13:59   





 


 Etomidate


  (Amidate)  8 mg  1X  ONCE  3/23/20 08:30


 3/23/20 08:31 DC 3/23/20 08:33


8 MG


 


 Fentanyl Citrate


  (Fentanyl 2ml


 Vial)  50 mcg  PRN Q5MIN  PRN  4/6/20 07:00


 4/7/20 06:59 DC  





 


 Furosemide


  (Lasix)  20 mg  1X  ONCE  4/2/20 08:15


 4/2/20 08:16 DC 4/2/20 08:19


20 MG


 


 Heparin Sodium


  (Porcine)


  (Heparin Sodium)  5,000 unit  Q12HR  4/3/20 21:00


    4/6/20 21:48


5,000 UNIT


 


 Hydromorphone HCl


  (Dilaudid)  1 mg  PRN Q3HRS  PRN  3/17/20 12:00


 3/31/20 00:25 DC 3/23/20 05:13


1 MG


 


 Info


  (CONTRAST GIVEN


 -- Rx MONITORING)  1 each  PRN DAILY  PRN  3/30/20 11:45


 4/1/20 11:44 DC  





 


 Info


  (Icu Electrolyte


 Protocol)  1 ea  CONT PRN  PRN  3/29/20 13:15


     





 


 Info


  (PHARMACY


 MONITORING -- do


 not chart)  1 each  PRN DAILY  PRN  4/6/20 14:00


     





 


 Info


  (Tpn Per


 Pharmacy)  1 each  PRN DAILY  PRN  3/18/20 12:30


   UNV  





 


 Insulin Human


 Lispro


  (HumaLOG)  0-9 UNITS  Q6HRS  3/16/20 09:30


    4/7/20 00:30


4 UNITS


 


 Insulin Human


 Regular


  (HumuLIN R VIAL)  5 unit  1X  ONCE  3/17/20 22:30


 3/17/20 22:31 DC 3/17/20 22:14


5 UNIT


 


 Iohexol


  (Omnipaque 240


 Mg/ml)  30 ml  1X  ONCE  3/30/20 11:30


 3/30/20 11:33 DC 3/30/20 11:30


30 ML


 


 Iohexol


  (Omnipaque 300


 Mg/ml)  60 ml  1X  ONCE  3/17/20 17:00


 3/17/20 17:01 DC 3/17/20 17:20


60 ML


 


 Iohexol


  (Omnipaque 350


 Mg/ml)  90 ml  1X  ONCE  3/16/20 03:30


 3/16/20 03:31 DC 3/16/20 03:25


90 ML


 


 Ketorolac


 Tromethamine


  (Toradol 30mg


 Vial)  30 mg  1X  ONCE  3/16/20 03:00


 3/16/20 03:01 DC 3/16/20 02:54


30 MG


 


 Lidocaine HCl


  (Buffered


 Lidocaine 1%)  6 ml  1X  ONCE  4/2/20 09:00


 4/2/20 09:06 DC 4/2/20 09:05


6 ML


 


 Lidocaine HCl


  (Glydo


  (Lidocaine) Jelly)  1 thomas  1X  ONCE  3/20/20 14:30


 3/20/20 14:31 DC 3/20/20 16:38


1 THOMAS


 


 Lidocaine HCl


  (Xylocaine-Mpf


 1% 2ml Vial)  2 ml  PRN 1X  PRN  4/6/20 07:00


 4/7/20 06:59 DC  





 


 Linezolid/Dextrose  300 ml @ 


 300 mls/hr  Q12HR  3/20/20 20:00


 3/27/20 07:50 DC 3/26/20 21:04


300 MLS/HR


 


 Lorazepam


  (Ativan Inj)  1 mg  PRN Q4HRS  PRN  3/19/20 09:00


    3/23/20 00:34


1 MG


 


 Magnesium Sulfate  50 ml @ 25


 mls/hr  PRN DAILY  PRN  4/5/20 09:15


     





 


 Meropenem 1 gm/


 Sodium Chloride  100 ml @ 


 200 mls/hr  Q8HRS  3/17/20 20:00


 3/18/20 08:48 DC 3/18/20 05:45


200 MLS/HR


 


 Meropenem 500 mg/


 Sodium Chloride  50 ml @ 


 100 mls/hr  Q6HRS  3/24/20 09:00


 3/25/20 07:29 DC 3/25/20 06:00


100 MLS/HR


 


 Metoprolol


 Tartrate


  (Lopressor Vial)  5 mg  Q6HRS  3/17/20 10:15


 3/28/20 08:48 DC 3/26/20 00:12


5 MG


 


 Metronidazole  100 ml @ 


 100 mls/hr  Q6HRS  3/23/20 08:30


    4/7/20 00:32


100 MLS/HR


 


 Micafungin Sodium


 100 mg/Dextrose  100 ml @ 


 100 mls/hr  Q24H  3/23/20 09:00


    4/6/20 08:36


100 MLS/HR


 


 Midazolam HCl


  (Versed)  5 mg  1X  ONCE  3/23/20 08:30


 3/23/20 08:31 DC  





 


 Midazolam HCl 100


 mg/Sodium Chloride  100 ml @ 7


 mls/hr  CONT  PRN  3/28/20 16:00


    4/7/20 02:21


7 MLS/HR


 


 Midazolam HCl 50


 mg/Sodium Chloride  50 ml @ 0


 mls/hr  CONT  PRN  3/23/20 08:15


 3/28/20 15:59 DC 3/26/20 22:39


7 MLS/HR


 


 Morphine Sulfate


  (Morphine


 Sulfate)  2 mg  PRN Q2HR  PRN  3/16/20 05:00


 3/17/20 14:15 DC 3/17/20 12:26


2 MG


 


 Multi-Ingred


 Cream/Lotion/Oil/


 Oint


  (Artificial


 Tears Eye


 Ointment)  1 thomas  PRN Q1HR  PRN  3/25/20 17:30


    4/3/20 11:05


1 THOMAS


 


 Norepinephrine


 Bitartrate 8 mg/


 Dextrose  258 ml @ 


 17.299 mls/


 hr  CONT  PRN  3/17/20 15:30


    4/5/20 21:18


1.73 MLS/HR


 


 Ondansetron HCl


  (Zofran)  4 mg  PRN Q6HRS  PRN  4/6/20 07:00


 4/7/20 06:59 DC  





 


 Pantoprazole


 Sodium


  (PROTONIX VIAL


 for IV PUSH)  40 mg  DAILYAC  3/16/20 11:30


    4/6/20 08:34


40 MG


 


 Piperacillin Sod/


 Tazobactam Sod


 4.5 gm/Sodium


 Chloride  100 ml @ 


 200 mls/hr  1X  ONCE  3/16/20 06:00


 3/16/20 06:29 DC 3/16/20 05:44


200 MLS/HR


 


 Potassium


 Chloride 15 meq/


 Bicarbonate


 Dialysis Soln w/


 out KCl  5,007.5 ml


  @ 1,000 mls/


 hr  Q5H1M  3/29/20 20:00


 4/2/20 13:08 DC 4/1/20 18:14


1,000 MLS/HR


 


 Potassium


 Chloride 20 meq/


 Bicarbonate


 Dialysis Soln w/


 out KCl  5,010 ml @ 


 1,000 mls/hr  Q5H1M  3/25/20 16:00


 3/29/20 19:59 DC 3/29/20 14:54


1,000 MLS/HR


 


 Potassium


 Chloride/Water  100 ml @ 


 100 mls/hr  Q1H  3/24/20 11:00


 3/24/20 12:59 DC 3/24/20 12:12


100 MLS/HR


 


 Potassium


 Phosphate 20 mmol/


 Sodium Chloride  106.6667


 ml @ 


 51.667 m...  1X  ONCE  3/25/20 13:00


 3/25/20 15:03 DC 3/25/20 12:51


51.667 MLS/HR


 


 Prochlorperazine


 Edisylate


  (Compazine)  5 mg  PACU PRN  PRN  4/6/20 07:00


 4/7/20 06:59 DC  





 


 Propofol  20 ml @ As


 Directed  STK-MED ONCE  4/6/20 11:07


 4/6/20 11:07 DC  





 


 Ringer's Solution  1,000 ml @ 


 30 mls/hr  Q24H  4/6/20 07:00


 4/6/20 18:59 DC  





 


 Sevoflurane


  (Ultane)  60 ml  STK-MED ONCE  4/6/20 12:46


 4/6/20 12:46 DC  





 


 Sodium


 Bicarbonate 50


 meq/Sodium


 Chloride  1,050 ml @ 


 75 mls/hr  Q14H  3/18/20 07:30


 3/23/20 10:28 DC 3/22/20 21:10


75 MLS/HR


 


 Sodium Chloride  1,000 ml @ 


 400 mls/hr  Q2H30M PRN  4/6/20 13:51


 4/7/20 01:50 DC  





 


 Sodium Chloride


  (Normal Saline


 Flush)  10 ml  1X PRN  PRN  4/3/20 07:30


 4/4/20 07:29 DC  





 


 Sodium Chloride


 90 meq/Calcium


 Gluconate 10 meq/


 Multivitamins 10


 ml/Chromium/


 Copper/Manganese/


 Seleni/Zn 0.5 ml/


 Total Parenteral


 Nutrition/Amino


 Acids/Dextrose/


 Fat Emulsion


 Intravenous  1,512 ml @ 


 63 mls/hr  TPN  CONT  3/18/20 22:00


 3/19/20 21:59 DC 3/18/20 22:06


63 MLS/HR


 


 Sodium Chloride


 90 meq/Calcium


 Gluconate 10 meq/


 Multivitamins 10


 ml/Chromium/


 Copper/Manganese/


 Seleni/Zn 1 ml/


 Total Parenteral


 Nutrition/Amino


 Acids/Dextrose/


 Fat Emulsion


 Intravenous  55.005 ml


  @ 2.292


 mls/hr  TPN  CONT  3/18/20 22:00


 3/18/20 12:33 DC  





 


 Sodium Chloride


 90 meq/Magnesium


 Sulfate 10 meq/


 Calcium Gluconate


 20 meq/


 Multivitamins 10


 ml/Chromium/


 Copper/Manganese/


 Seleni/Zn 0.5 ml/


 Total Parenteral


 Nutrition/Amino


 Acids/Dextrose/


 Fat Emulsion


 Intravenous  1,512 ml @ 


 63 mls/hr  TPN  CONT  3/19/20 22:00


 3/20/20 21:59 DC 3/19/20 22:25


63 MLS/HR


 


 Sodium Chloride


 90 meq/Potassium


 Chloride 15 meq/


 Potassium


 Phosphate 10 mmol/


 Magnesium Sulfate


 8 meq/Calcium


 Gluconate 15 meq/


 Multivitamins 10


 ml/Chromium/


 Copper/Manganese/


 Seleni/Zn 0.5 ml/


 Insulin Human


 Regular 25 unit/


 Total Parenteral


 Nutrition/Amino


 Acids/Dextrose/


 Fat Emulsion


 Intravenous  1,400 ml @ 


 58.333 mls/


 hr  TPN  CONT  4/5/20 22:00


 4/6/20 21:59 DC 4/5/20 21:20


58.333 MLS/HR


 


 Sodium Chloride


 90 meq/Potassium


 Chloride 15 meq/


 Potassium


 Phosphate 10 mmol/


 Magnesium Sulfate


 10 meq/Calcium


 Gluconate 20 meq/


 Multivitamins 10


 ml/Chromium/


 Copper/Manganese/


 Seleni/Zn 0.5 ml/


 Total Parenteral


 Nutrition/Amino


 Acids/Dextrose/


 Fat Emulsion


 Intravenous  1,400 ml @ 


 58.333 mls/


 hr  TPN  CONT  3/23/20 22:00


 3/24/20 21:59 DC 3/23/20 21:42


58.333 MLS/HR


 


 Sodium Chloride


 90 meq/Potassium


 Chloride 15 meq/


 Potassium


 Phosphate 10 mmol/


 Magnesium Sulfate


 12 meq/Calcium


 Gluconate 15 meq/


 Multivitamins 10


 ml/Chromium/


 Copper/Manganese/


 Seleni/Zn 0.5 ml/


 Insulin Human


 Regular 25 unit/


 Total Parenteral


 Nutrition/Amino


 Acids/Dextrose/


 Fat Emulsion


 Intravenous  1,400 ml @ 


 58.333 mls/


 hr  TPN  CONT  4/6/20 22:00


 4/7/20 21:59  4/6/20 22:24


58.333 MLS/HR


 


 Sodium Chloride


 90 meq/Potassium


 Chloride 15 meq/


 Potassium


 Phosphate 15 mmol/


 Magnesium Sulfate


 10 meq/Calcium


 Gluconate 15 meq/


 Multivitamins 10


 ml/Chromium/


 Copper/Manganese/


 Seleni/Zn 0.5 ml/


 Total Parenteral


 Nutrition/Amino


 Acids/Dextrose/


 Fat Emulsion


 Intravenous  1,400 ml @ 


 58.333 mls/


 hr  TPN  CONT  3/24/20 22:00


 3/25/20 21:59 DC 3/24/20 22:17


58.333 MLS/HR


 


 Sodium Chloride


 90 meq/Potassium


 Chloride 15 meq/


 Potassium


 Phosphate 15 mmol/


 Magnesium Sulfate


 10 meq/Calcium


 Gluconate 20 meq/


 Multivitamins 10


 ml/Chromium/


 Copper/Manganese/


 Seleni/Zn 0.5 ml/


 Total Parenteral


 Nutrition/Amino


 Acids/Dextrose/


 Fat Emulsion


 Intravenous  1,200 ml @ 


 50 mls/hr  TPN  CONT  3/22/20 22:00


 3/22/20 14:17 DC  





 


 Sodium Chloride


 90 meq/Potassium


 Chloride 15 meq/


 Potassium


 Phosphate 18 mmol/


 Magnesium Sulfate


 8 meq/Calcium


 Gluconate 15 meq/


 Multivitamins 10


 ml/Chromium/


 Copper/Manganese/


 Seleni/Zn 0.5 ml/


 Insulin Human


 Regular 10 unit/


 Total Parenteral


 Nutrition/Amino


 Acids/Dextrose/


 Fat Emulsion


 Intravenous  1,400 ml @ 


 58.333 mls/


 hr  TPN  CONT  3/27/20 22:00


 3/28/20 21:59 DC 3/27/20 21:43


58.333 MLS/HR


 


 Sodium Chloride


 90 meq/Potassium


 Chloride 15 meq/


 Potassium


 Phosphate 18 mmol/


 Magnesium Sulfate


 8 meq/Calcium


 Gluconate 15 meq/


 Multivitamins 10


 ml/Chromium/


 Copper/Manganese/


 Seleni/Zn 0.5 ml/


 Insulin Human


 Regular 15 unit/


 Total Parenteral


 Nutrition/Amino


 Acids/Dextrose/


 Fat Emulsion


 Intravenous  1,400 ml @ 


 58.333 mls/


 hr  TPN  CONT  3/30/20 22:00


 3/31/20 21:59 DC 3/30/20 21:47


58.333 MLS/HR


 


 Sodium Chloride


 90 meq/Potassium


 Chloride 15 meq/


 Potassium


 Phosphate 18 mmol/


 Magnesium Sulfate


 8 meq/Calcium


 Gluconate 15 meq/


 Multivitamins 10


 ml/Chromium/


 Copper/Manganese/


 Seleni/Zn 0.5 ml/


 Insulin Human


 Regular 20 unit/


 Total Parenteral


 Nutrition/Amino


 Acids/Dextrose/


 Fat Emulsion


 Intravenous  1,400 ml @ 


 58.333 mls/


 hr  TPN  CONT  4/2/20 22:00


 4/3/20 21:59 DC 4/2/20 22:45


58.333 MLS/HR


 


 Sodium Chloride


 90 meq/Potassium


 Chloride 15 meq/


 Potassium


 Phosphate 18 mmol/


 Magnesium Sulfate


 8 meq/Calcium


 Gluconate 15 meq/


 Multivitamins 10


 ml/Chromium/


 Copper/Manganese/


 Seleni/Zn 0.5 ml/


 Total Parenteral


 Nutrition/Amino


 Acids/Dextrose/


 Fat Emulsion


 Intravenous  1,400 ml @ 


 58.333 mls/


 hr  TPN  CONT  3/26/20 22:00


 3/27/20 21:59 DC 3/26/20 22:00


58.333 MLS/HR


 


 Succinylcholine


 Chloride


  (Anectine)  120 mg  1X  ONCE  3/23/20 08:30


 3/23/20 08:31 DC 3/23/20 08:34


120 MG








Lab





Laboratory Tests








Test


 4/6/20


11:21 4/6/20


19:07 4/7/20


00:27 4/7/20


06:45


 


Glucose (Fingerstick)


 169 mg/dL


(70-99) 182 mg/dL


(70-99) 170 mg/dL


(70-99) 





 


White Blood Count


 


 


 


 11.0 x10^3/uL


(4.0-11.0)


 


Red Blood Count


 


 


 


 2.50 x10^6/uL


(3.50-5.40)


 


Hemoglobin


 


 


 


 8.0 g/dL


(12.0-15.5)


 


Hematocrit


 


 


 


 24.9 %


(36.0-47.0)


 


Mean Corpuscular Volume    99 fL () 


 


Mean Corpuscular Hemoglobin    32 pg (25-35) 


 


Mean Corpuscular Hemoglobin


Concent 


 


 


 32 g/dL


(31-37)


 


Red Cell Distribution Width


 


 


 


 18.9 %


(11.5-14.5)


 


Platelet Count


 


 


 


 270 x10^3/uL


(140-400)


 


Sodium Level


 


 


 


 139 mmol/L


(136-145)


 


Potassium Level


 


 


 


 4.6 mmol/L


(3.5-5.1)


 


Chloride Level


 


 


 


 104 mmol/L


()


 


Carbon Dioxide Level


 


 


 


 25 mmol/L


(21-32)


 


Anion Gap    10 (6-14) 


 


Blood Urea Nitrogen


 


 


 


 73 mg/dL


(7-20)


 


Creatinine


 


 


 


 2.3 mg/dL


(0.6-1.0)


 


Estimated GFR


(Cockcroft-Gault) 


 


 


 22.5 





 


Glucose Level


 


 


 


 155 mg/dL


(70-99)


 


Calcium Level


 


 


 


 8.9 mg/dL


(8.5-10.1)


 


Phosphorus Level


 


 


 


 5.8 mg/dL


(2.6-4.7)


 


Albumin


 


 


 


 2.9 g/dL


(3.4-5.0)


 


Test


 4/7/20


06:49 4/7/20


08:00 


 





 


Glucose (Fingerstick)


 145 mg/dL


(70-99) 


 


 





 


O2 Saturation  96 % (92-99)   


 


Arterial Blood pH


 


 7.30


(7.35-7.45) 


 





 


Arterial Blood pCO2 at


Patient Temp 


 45 mmHg


(35-46) 


 





 


Arterial Blood pO2 at Patient


Temp 


 95 mmHg


() 


 





 


Arterial Blood HCO3


 


 22 mmol/L


(21-28) 


 





 


Arterial Blood Base Excess


 


 -5 mmol/L


(-3-3) 


 





 


FiO2  40   








Results


All relevant outside records, renal labs, imaging studies, telemetry/EKG's were 

reviewed.











KIMBERLY MYERS MD                 Apr 7, 2020 09:25

## 2020-04-07 NOTE — RAD
CHEST AP ONLY

 

History: Pleural effusions.

 

Comparison: April 6, 2020

 

Findings:

Moderate layering right pleural effusion. Small layering left pleural 

effusion. Diffuse interstitial thickening and bibasilar consolidations. 

Stable tracheostomy tube, enteric tube, right IJ central line, right PICC 

and left-sided central line.

 

Impression: 

1.  Moderate to large right and small left layering pleural effusions.

2.  Diffuse interstitial thickening with bibasilar consolidations, 

unchanged.

 

Electronically signed by: Torrey Coyle DO (4/7/2020 8:46 AM) [id number]

## 2020-04-07 NOTE — RAD
Single View of the Abdomen. 4.7.20

 

Indication: eval NG position.

 

Findings: NG tube extends into the distal stomach. The bowel gas pattern 

is nonobstructive. No acute osseous changes are seen. No gross 

pneumoperitoneum is identified. 

 

Impression: 

 

 

1. NG tube extends into the distal stomach

 

2. Non obstructive bowel gas pattern. 

 

Electronically signed by: Jerry Stratton MD (4/7/2020 12:53 PM) [id number]

## 2020-04-07 NOTE — NUR
1200 dose Flagyl held due to patient being dialyzed. Patient receiving 1800 dose after 
dialysis. 

Patient being dialyzed in ICU room 116.

## 2020-04-07 NOTE — NUR
SS following up with discharge planning. SS discussed with pt RN. Pt remains on the vent at 
this time. Trach placed on 4/6/2020. Pt to receive hemodialysis on Tuesdays, Thursdays, and 
Saturdays. SS will continue to follow for discharge planning.

## 2020-04-07 NOTE — PDOC
Objective:


Objective:


D/w nurse - stable post trach, ongoing gastric drainage.


On TPN, IV PPI.


Vital Signs:





                                   Vital Signs








  Date Time  Temp Pulse Resp B/P (MAP) Pulse Ox O2 Delivery O2 Flow Rate FiO2


 


4/7/20 07:49     99 Ventilator  


 


4/7/20 07:00  86 18 112/68 (83)    


 


4/7/20 04:00 97.4       





 97.4       








Labs:





Laboratory Tests








Test


 4/6/20


11:21 4/6/20


19:07 4/7/20


00:27 4/7/20


06:45


 


Glucose (Fingerstick) 169 mg/dL  182 mg/dL  170 mg/dL  


 


White Blood Count    11.0 x10^3/uL 


 


Red Blood Count    2.50 x10^6/uL 


 


Hemoglobin    8.0 g/dL 


 


Hematocrit    24.9 % 


 


Mean Corpuscular Volume    99 fL 


 


Mean Corpuscular Hemoglobin    32 pg 


 


Mean Corpuscular Hemoglobin


Concent 


 


 


 32 g/dL 





 


Red Cell Distribution Width    18.9 % 


 


Platelet Count    270 x10^3/uL 


 


Sodium Level    139 mmol/L 


 


Potassium Level    4.6 mmol/L 


 


Chloride Level    104 mmol/L 


 


Carbon Dioxide Level    25 mmol/L 


 


Anion Gap    10 


 


Blood Urea Nitrogen    73 mg/dL 


 


Creatinine    2.3 mg/dL 


 


Estimated GFR


(Cockcroft-Gault) 


 


 


 22.5 





 


Glucose Level    155 mg/dL 


 


Calcium Level    8.9 mg/dL 


 


Phosphorus Level    5.8 mg/dL 


 


Albumin    2.9 g/dL 


 


Test


 4/7/20


06:49 4/7/20


08:00 


 





 


Glucose (Fingerstick) 145 mg/dL    


 


O2 Saturation  96 %   


 


Arterial Blood pH  7.30   


 


Arterial Blood pCO2 at


Patient Temp 


 45 mmHg 


 


 





 


Arterial Blood pO2 at Patient


Temp 


 95 mmHg 


 


 





 


Arterial Blood HCO3  22 mmol/L   


 


Arterial Blood Base Excess  -5 mmol/L   


 


FiO2  40   








Imaging:


CXR 4/7


Impression: 


1.  Moderate to large right and small left layering pleural effusions.


2.  Diffuse interstitial thickening with bibasilar consolidations, unchanged.





PE:





GEN: trach/vent


LUNGS: clear


HEART: RRR


ABD: distended/tight,r ectal tube dark liquid, NG bilious


NEURO/PSYCH: vent





A/P:


Gallstone pancreatitis, MOSF





--


Continue support.





Hemodynamically unstable?:  No


Is patient in severe pain?:  No


Is NPO status required?:  Yes











RAGHAVENDRA MURCIA          Apr 7, 2020 09:47

## 2020-04-07 NOTE — PDOC
Infectious Disease Note


Subjective:


Subjective


Sedated and intubated,  


remains afebrile


TPN 


Rectal tube





Vital Signs:


Vital Signs





Vital Signs








  Date Time  Temp Pulse Resp B/P (MAP) Pulse Ox O2 Delivery O2 Flow Rate FiO2


 


4/7/20 07:49     99 Ventilator  


 


4/7/20 07:00  86 18 112/68 (83)    


 


4/7/20 04:00 97.4       





 97.4       











Physical Exam:


PHYSICAL EXAM


GENERAL: Orally intubated/sedated - generalized anasarca 


HEENT: Pupils equal, ETT, dobhoff +


NECK:  Supple 


LUNGS: Diminished aeration bases 


HEART:  S1, S2, regular 


ABDOMEN:  Distended, hypoactive BS, Rectal tube in place 


: [place]   


EXTREMITIES: Generalized edema, no cyanosis - some mottling, [place] boots & SCDs 

bilaterally 


DERMATOLOGIC:  Warm and dry.  No generalized rash.  


CENTRAL NERVOUS SYSTEM: Sedated 


RIJ Temp HDC, RUE-PICC  


Lt IJ removed 4/7


Chestwall line present





Medications:


Inpatient Meds:





Current Medications








 Medications


  (Trade)  Dose


 Ordered  Sig/Yee  Start Time


 Stop Time Status Last Admin


Dose Admin


 


 Acetaminophen


  (Tylenol Supp)  650 mg  PRN Q6HRS  PRN  3/24/20 10:30


    3/24/20 10:37


650 MG


 


 Acetaminophen


  (Tylenol)  650 mg  PRN Q6HRS  PRN  3/21/20 03:36


    3/26/20 07:35


650 MG


 


 Albumin Human  200 ml @ 


 200 mls/hr  1X PRN  PRN  4/6/20 14:00


 4/6/20 19:59 DC 4/6/20 14:51


200 MLS/HR


 


 Albuterol Sulfate


  (Ventolin Neb


 Soln)  2.5 mg  1X  ONCE  3/17/20 22:30


 3/17/20 22:31 DC 3/18/20 00:56


2.5 MG


 


 Alteplase,


 Recombinant


  (Cathflo For


 Central Catheter


 Clearance)  1 mg  1X  ONCE  3/31/20 22:00


 3/31/20 22:01 DC 3/31/20 22:05


1 MG


 


 Artificial Tears


  (Artificial


 Tears)  1 drop  PRN Q1HR  PRN  3/23/20 08:15


     





 


 Atenolol


  (Tenormin)  100 mg  DAILY  3/17/20 09:00


 3/16/20 20:08 DC  





 


 Atropine Sulfate


  (ATROPINE 0.5mg


 SYRINGE)  0.5 mg  PRN Q5MIN  PRN  4/2/20 08:15


     





 


 Benzocaine


  (Hurricaine One)  1 spray  1X  ONCE  3/20/20 14:30


 3/20/20 14:31 DC 3/20/20 16:38


1 SPRAY


 


 Bupivacaine HCl/


 Epinephrine Bitart


  (Sensorcain-Epi


 0.5%-1:960533 Mpf)  30 ml  STK-MED ONCE  4/6/20 11:00


 4/6/20 11:01 DC 4/6/20 11:44


1 ML


 


 Calcium Carbonate/


 Glycine


  (Tums)  500 mg  PRN AFTMEALHC  PRN  3/18/20 17:45


     





 


 Calcium Chloride


 1000 mg/Sodium


 Chloride  110 ml @ 


 220 mls/hr  1X  ONCE  3/17/20 22:30


 3/17/20 22:59 DC 3/17/20 22:11


220 MLS/HR


 


 Calcium Chloride


 3000 mg/Sodium


 Chloride  1,030 ml @ 


 50 mls/hr  [id number]  3/19/20 08:00


 3/21/20 15:23 DC 3/21/20 02:17


50 MLS/HR


 


 Calcium Gluconate


  (Calcium


 Gluconate)  2,000 mg  1X  ONCE  3/19/20 02:15


 3/19/20 02:16 DC 3/19/20 02:19


2,000 MG


 


 Calcium Gluconate


 1000 mg/Sodium


 Chloride  110 ml @ 


 220 mls/hr  1X  ONCE  3/18/20 03:30


 3/18/20 03:59 DC 3/18/20 03:21


220 MLS/HR


 


 Calcium Gluconate


 2000 mg/Sodium


 Chloride  120 ml @ 


 220 mls/hr  1X  ONCE  3/18/20 07:30


 3/18/20 08:02 DC 3/18/20 09:05


220 MLS/HR


 


 Cefepime HCl


  (Maxipime)  2 gm  Q12HR  3/25/20 09:00


    4/6/20 21:47


2 GM


 


 Cellulose


  (Surgicel


 Fibrillar 1x2)  1 each  STK-MED ONCE  4/6/20 11:00


 4/6/20 11:01 DC  





 


 Cellulose


  (Surgicel


 Hemostat 4x8)  1 each  STK-MED ONCE  4/6/20 11:55


 4/6/20 11:56 DC 4/6/20 11:44


1 EACH


 


 Daptomycin 500 mg/


 Sodium Chloride  50 ml @ 


 100 mls/hr  Q48H  3/25/20 08:30


    4/6/20 08:35


100 MLS/HR


 


 Dexmedetomidine


 HCl 400 mcg/


 Sodium Chloride  100 ml @ 0


 mls/hr  CONT  PRN  4/2/20 08:15


    4/7/20 06:53


27.8 MLS/HR


 


 Dextrose


  (Dextrose


 50%-Water Syringe)  12.5 gm  PRN Q15MIN  PRN  3/16/20 09:30


     





 


 Digoxin


  (Lanoxin)  125 mcg  1X  ONCE  3/19/20 18:00


 3/19/20 18:01 DC 3/19/20 17:10


125 MCG


 


 Diphenhydramine


 HCl


  (Benadryl)  25 mg  1X PRN  PRN  4/6/20 14:00


 4/7/20 13:59   





 


 Etomidate


  (Amidate)  8 mg  1X  ONCE  3/23/20 08:30


 3/23/20 08:31 DC 3/23/20 08:33


8 MG


 


 Fentanyl Citrate


  (Fentanyl 2ml


 Vial)  50 mcg  PRN Q5MIN  PRN  4/6/20 07:00


 4/7/20 06:59 DC  





 


 Furosemide


  (Lasix)  20 mg  1X  ONCE  4/2/20 08:15


 4/2/20 08:16 DC 4/2/20 08:19


20 MG


 


 Heparin Sodium


  (Porcine)


  (Heparin Sodium)  5,000 unit  Q12HR  4/3/20 21:00


    4/6/20 21:48


5,000 UNIT


 


 Hydromorphone HCl


  (Dilaudid)  1 mg  PRN Q3HRS  PRN  3/17/20 12:00


 3/31/20 00:25 DC 3/23/20 05:13


1 MG


 


 Info


  (CONTRAST GIVEN


 -- Rx MONITORING)  1 each  PRN DAILY  PRN  3/30/20 11:45


 4/1/20 11:44 DC  





 


 Info


  (Icu Electrolyte


 Protocol)  1 ea  CONT PRN  PRN  3/29/20 13:15


     





 


 Info


  (PHARMACY


 MONITORING -- do


 not chart)  1 each  PRN DAILY  PRN  4/6/20 14:00


     





 


 Info


  (Tpn Per


 Pharmacy)  1 each  PRN DAILY  PRN  3/18/20 12:30


   UNV  





 


 Insulin Human


 Lispro


  (HumaLOG)  0-9 UNITS  Q6HRS  3/16/20 09:30


    4/7/20 00:30


4 UNITS


 


 Insulin Human


 Regular


  (HumuLIN R VIAL)  5 unit  1X  ONCE  3/17/20 22:30


 3/17/20 22:31 DC 3/17/20 22:14


5 UNIT


 


 Iohexol


  (Omnipaque 240


 Mg/ml)  30 ml  1X  ONCE  3/30/20 11:30


 3/30/20 11:33 DC 3/30/20 11:30


30 ML


 


 Iohexol


  (Omnipaque 300


 Mg/ml)  60 ml  1X  ONCE  3/17/20 17:00


 3/17/20 17:01 DC 3/17/20 17:20


60 ML


 


 Iohexol


  (Omnipaque 350


 Mg/ml)  90 ml  1X  ONCE  3/16/20 03:30


 3/16/20 03:31 DC 3/16/20 03:25


90 ML


 


 Ketorolac


 Tromethamine


  (Toradol 30mg


 Vial)  30 mg  1X  ONCE  3/16/20 03:00


 3/16/20 03:01 DC 3/16/20 02:54


30 MG


 


 Lidocaine HCl


  (Buffered


 Lidocaine 1%)  6 ml  1X  ONCE  4/2/20 09:00


 4/2/20 09:06 DC 4/2/20 09:05


6 ML


 


 Lidocaine HCl


  (Glydo


  (Lidocaine) Jelly)  1 thomas  1X  ONCE  3/20/20 14:30


 3/20/20 14:31 DC 3/20/20 16:38


1 THOMAS


 


 Lidocaine HCl


  (Xylocaine-Mpf


 1% 2ml Vial)  2 ml  PRN 1X  PRN  4/6/20 07:00


 4/7/20 06:59 DC  





 


 Linezolid/Dextrose  300 ml @ 


 300 mls/hr  Q12HR  3/20/20 20:00


 3/27/20 07:50 DC 3/26/20 21:04


300 MLS/HR


 


 Lorazepam


  (Ativan Inj)  1 mg  PRN Q4HRS  PRN  3/19/20 09:00


    3/23/20 00:34


1 MG


 


 Magnesium Sulfate  50 ml @ 25


 mls/hr  PRN DAILY  PRN  4/5/20 09:15


     





 


 Meropenem 1 gm/


 Sodium Chloride  100 ml @ 


 200 mls/hr  Q8HRS  3/17/20 20:00


 3/18/20 08:48 DC 3/18/20 05:45


200 MLS/HR


 


 Meropenem 500 mg/


 Sodium Chloride  50 ml @ 


 100 mls/hr  Q6HRS  3/24/20 09:00


 3/25/20 07:29 DC 3/25/20 06:00


100 MLS/HR


 


 Metoprolol


 Tartrate


  (Lopressor Vial)  5 mg  Q6HRS  3/17/20 10:15


 3/28/20 08:48 DC 3/26/20 00:12


5 MG


 


 Metronidazole  100 ml @ 


 100 mls/hr  Q6HRS  3/23/20 08:30


    4/7/20 00:32


100 MLS/HR


 


 Micafungin Sodium


 100 mg/Dextrose  100 ml @ 


 100 mls/hr  Q24H  3/23/20 09:00


    4/6/20 08:36


100 MLS/HR


 


 Midazolam HCl


  (Versed)  5 mg  1X  ONCE  3/23/20 08:30


 3/23/20 08:31 DC  





 


 Midazolam HCl 100


 mg/Sodium Chloride  100 ml @ 7


 mls/hr  CONT  PRN  3/28/20 16:00


    4/7/20 02:21


7 MLS/HR


 


 Midazolam HCl 50


 mg/Sodium Chloride  50 ml @ 0


 mls/hr  CONT  PRN  3/23/20 08:15


 3/28/20 15:59 DC 3/26/20 22:39


7 MLS/HR


 


 Morphine Sulfate


  (Morphine


 Sulfate)  2 mg  PRN Q2HR  PRN  3/16/20 05:00


 3/17/20 14:15 DC 3/17/20 12:26


2 MG


 


 Multi-Ingred


 Cream/Lotion/Oil/


 Oint


  (Artificial


 Tears Eye


 Ointment)  1 thomas  PRN Q1HR  PRN  3/25/20 17:30


    4/3/20 11:05


1 THOMAS


 


 Norepinephrine


 Bitartrate 8 mg/


 Dextrose  258 ml @ 


 17.299 mls/


 hr  CONT  PRN  3/17/20 15:30


    4/5/20 21:18


1.73 MLS/HR


 


 Ondansetron HCl


  (Zofran)  4 mg  PRN Q6HRS  PRN  4/6/20 07:00


 4/7/20 06:59 DC  





 


 Pantoprazole


 Sodium


  (PROTONIX VIAL


 for IV PUSH)  40 mg  DAILYAC  3/16/20 11:30


    4/6/20 08:34


40 MG


 


 Piperacillin Sod/


 Tazobactam Sod


 4.5 gm/Sodium


 Chloride  100 ml @ 


 200 mls/hr  1X  ONCE  3/16/20 06:00


 3/16/20 06:29 DC 3/16/20 05:44


200 MLS/HR


 


 Potassium


 Chloride 15 meq/


 Bicarbonate


 Dialysis Soln w/


 out KCl  5,007.5 ml


  @ 1,000 mls/


 hr  Q5H1M  3/29/20 20:00


 4/2/20 13:08 DC 4/1/20 18:14


1,000 MLS/HR


 


 Potassium


 Chloride 20 meq/


 Bicarbonate


 Dialysis Soln w/


 out KCl  5,010 ml @ 


 1,000 mls/hr  Q5H1M  3/25/20 16:00


 3/29/20 19:59 DC 3/29/20 14:54


1,000 MLS/HR


 


 Potassium


 Chloride/Water  100 ml @ 


 100 mls/hr  Q1H  3/24/20 11:00


 3/24/20 12:59 DC 3/24/20 12:12


100 MLS/HR


 


 Potassium


 Phosphate 20 mmol/


 Sodium Chloride  106.6667


 ml @ 


 51.667 m...  1X  ONCE  3/25/20 13:00


 3/25/20 15:03 DC 3/25/20 12:51


51.667 MLS/HR


 


 Prochlorperazine


 Edisylate


  (Compazine)  5 mg  PACU PRN  PRN  4/6/20 07:00


 4/7/20 06:59 DC  





 


 Propofol  20 ml @ As


 Directed  STK-MED ONCE  4/6/20 11:07


 4/6/20 11:07 DC  





 


 Ringer's Solution  1,000 ml @ 


 30 mls/hr  Q24H  4/6/20 07:00


 4/6/20 18:59 DC  





 


 Sevoflurane


  (Ultane)  60 ml  STK-MED ONCE  4/6/20 12:46


 4/6/20 12:46 DC  





 


 Sodium


 Bicarbonate 50


 meq/Sodium


 Chloride  1,050 ml @ 


 75 mls/hr  Q14H  3/18/20 07:30


 3/23/20 10:28 DC 3/22/20 21:10


75 MLS/HR


 


 Sodium Chloride  1,000 ml @ 


 400 mls/hr  Q2H30M PRN  4/6/20 13:51


 4/7/20 01:50 DC  





 


 Sodium Chloride


  (Normal Saline


 Flush)  10 ml  1X PRN  PRN  4/3/20 07:30


 4/4/20 07:29 DC  





 


 Sodium Chloride


 90 meq/Calcium


 Gluconate 10 meq/


 Multivitamins 10


 ml/Chromium/


 Copper/Manganese/


 Seleni/Zn 0.5 ml/


 Total Parenteral


 Nutrition/Amino


 Acids/Dextrose/


 Fat Emulsion


 Intravenous  1,512 ml @ 


 63 mls/hr  TPN  CONT  3/18/20 22:00


 3/19/20 21:59 DC 3/18/20 22:06


63 MLS/HR


 


 Sodium Chloride


 90 meq/Calcium


 Gluconate 10 meq/


 Multivitamins 10


 ml/Chromium/


 Copper/Manganese/


 Seleni/Zn 1 ml/


 Total Parenteral


 Nutrition/Amino


 Acids/Dextrose/


 Fat Emulsion


 Intravenous  55.005 ml


  @ 2.292


 mls/hr  TPN  CONT  3/18/20 22:00


 3/18/20 12:33 DC  





 


 Sodium Chloride


 90 meq/Magnesium


 Sulfate 10 meq/


 Calcium Gluconate


 20 meq/


 Multivitamins 10


 ml/Chromium/


 Copper/Manganese/


 Seleni/Zn 0.5 ml/


 Total Parenteral


 Nutrition/Amino


 Acids/Dextrose/


 Fat Emulsion


 Intravenous  1,512 ml @ 


 63 mls/hr  TPN  CONT  3/19/20 22:00


 3/20/20 21:59 DC 3/19/20 22:25


63 MLS/HR


 


 Sodium Chloride


 90 meq/Potassium


 Chloride 15 meq/


 Potassium


 Phosphate 10 mmol/


 Magnesium Sulfate


 8 meq/Calcium


 Gluconate 15 meq/


 Multivitamins 10


 ml/Chromium/


 Copper/Manganese/


 Seleni/Zn 0.5 ml/


 Insulin Human


 Regular 25 unit/


 Total Parenteral


 Nutrition/Amino


 Acids/Dextrose/


 Fat Emulsion


 Intravenous  1,400 ml @ 


 58.333 mls/


 hr  TPN  CONT  4/5/20 22:00


 4/6/20 21:59 DC 4/5/20 21:20


58.333 MLS/HR


 


 Sodium Chloride


 90 meq/Potassium


 Chloride 15 meq/


 Potassium


 Phosphate 10 mmol/


 Magnesium Sulfate


 10 meq/Calcium


 Gluconate 20 meq/


 Multivitamins 10


 ml/Chromium/


 Copper/Manganese/


 Seleni/Zn 0.5 ml/


 Total Parenteral


 Nutrition/Amino


 Acids/Dextrose/


 Fat Emulsion


 Intravenous  1,400 ml @ 


 58.333 mls/


 hr  TPN  CONT  3/23/20 22:00


 3/24/20 21:59 DC 3/23/20 21:42


58.333 MLS/HR


 


 Sodium Chloride


 90 meq/Potassium


 Chloride 15 meq/


 Potassium


 Phosphate 10 mmol/


 Magnesium Sulfate


 12 meq/Calcium


 Gluconate 15 meq/


 Multivitamins 10


 ml/Chromium/


 Copper/Manganese/


 Seleni/Zn 0.5 ml/


 Insulin Human


 Regular 25 unit/


 Total Parenteral


 Nutrition/Amino


 Acids/Dextrose/


 Fat Emulsion


 Intravenous  1,400 ml @ 


 58.333 mls/


 hr  TPN  CONT  4/6/20 22:00


 4/7/20 21:59  4/6/20 22:24


58.333 MLS/HR


 


 Sodium Chloride


 90 meq/Potassium


 Chloride 15 meq/


 Potassium


 Phosphate 15 mmol/


 Magnesium Sulfate


 10 meq/Calcium


 Gluconate 15 meq/


 Multivitamins 10


 ml/Chromium/


 Copper/Manganese/


 Seleni/Zn 0.5 ml/


 Total Parenteral


 Nutrition/Amino


 Acids/Dextrose/


 Fat Emulsion


 Intravenous  1,400 ml @ 


 58.333 mls/


 hr  TPN  CONT  3/24/20 22:00


 3/25/20 21:59 DC 3/24/20 22:17


58.333 MLS/HR


 


 Sodium Chloride


 90 meq/Potassium


 Chloride 15 meq/


 Potassium


 Phosphate 15 mmol/


 Magnesium Sulfate


 10 meq/Calcium


 Gluconate 20 meq/


 Multivitamins 10


 ml/Chromium/


 Copper/Manganese/


 Seleni/Zn 0.5 ml/


 Total Parenteral


 Nutrition/Amino


 Acids/Dextrose/


 Fat Emulsion


 Intravenous  1,200 ml @ 


 50 mls/hr  TPN  CONT  3/22/20 22:00


 3/22/20 14:17 DC  





 


 Sodium Chloride


 90 meq/Potassium


 Chloride 15 meq/


 Potassium


 Phosphate 18 mmol/


 Magnesium Sulfate


 8 meq/Calcium


 Gluconate 15 meq/


 Multivitamins 10


 ml/Chromium/


 Copper/Manganese/


 Seleni/Zn 0.5 ml/


 Insulin Human


 Regular 10 unit/


 Total Parenteral


 Nutrition/Amino


 Acids/Dextrose/


 Fat Emulsion


 Intravenous  1,400 ml @ 


 58.333 mls/


 hr  TPN  CONT  3/27/20 22:00


 3/28/20 21:59 DC 3/27/20 21:43


58.333 MLS/HR


 


 Sodium Chloride


 90 meq/Potassium


 Chloride 15 meq/


 Potassium


 Phosphate 18 mmol/


 Magnesium Sulfate


 8 meq/Calcium


 Gluconate 15 meq/


 Multivitamins 10


 ml/Chromium/


 Copper/Manganese/


 Seleni/Zn 0.5 ml/


 Insulin Human


 Regular 15 unit/


 Total Parenteral


 Nutrition/Amino


 Acids/Dextrose/


 Fat Emulsion


 Intravenous  1,400 ml @ 


 58.333 mls/


 hr  TPN  CONT  3/30/20 22:00


 3/31/20 21:59 DC 3/30/20 21:47


58.333 MLS/HR


 


 Sodium Chloride


 90 meq/Potassium


 Chloride 15 meq/


 Potassium


 Phosphate 18 mmol/


 Magnesium Sulfate


 8 meq/Calcium


 Gluconate 15 meq/


 Multivitamins 10


 ml/Chromium/


 Copper/Manganese/


 Seleni/Zn 0.5 ml/


 Insulin Human


 Regular 20 unit/


 Total Parenteral


 Nutrition/Amino


 Acids/Dextrose/


 Fat Emulsion


 Intravenous  1,400 ml @ 


 58.333 mls/


 hr  TPN  CONT  4/2/20 22:00


 4/3/20 21:59 DC 4/2/20 22:45


58.333 MLS/HR


 


 Sodium Chloride


 90 meq/Potassium


 Chloride 15 meq/


 Potassium


 Phosphate 18 mmol/


 Magnesium Sulfate


 8 meq/Calcium


 Gluconate 15 meq/


 Multivitamins 10


 ml/Chromium/


 Copper/Manganese/


 Seleni/Zn 0.5 ml/


 Total Parenteral


 Nutrition/Amino


 Acids/Dextrose/


 Fat Emulsion


 Intravenous  1,400 ml @ 


 58.333 mls/


 hr  TPN  CONT  3/26/20 22:00


 3/27/20 21:59 DC 3/26/20 22:00


58.333 MLS/HR


 


 Succinylcholine


 Chloride


  (Anectine)  120 mg  1X  ONCE  3/23/20 08:30


 3/23/20 08:31 DC 3/23/20 08:34


120 MG











Labs:


Lab





Laboratory Tests








Test


 4/6/20


11:21 4/6/20


19:07 4/7/20


00:27 4/7/20


06:45


 


Glucose (Fingerstick)


 169 mg/dL


(70-99) 182 mg/dL


(70-99) 170 mg/dL


(70-99) 





 


White Blood Count


 


 


 


 11.0 x10^3/uL


(4.0-11.0)


 


Red Blood Count


 


 


 


 2.50 x10^6/uL


(3.50-5.40)


 


Hemoglobin


 


 


 


 8.0 g/dL


(12.0-15.5)


 


Hematocrit


 


 


 


 24.9 %


(36.0-47.0)


 


Mean Corpuscular Volume    99 fL () 


 


Mean Corpuscular Hemoglobin    32 pg (25-35) 


 


Mean Corpuscular Hemoglobin


Concent 


 


 


 32 g/dL


(31-37)


 


Red Cell Distribution Width


 


 


 


 18.9 %


(11.5-14.5)


 


Platelet Count


 


 


 


 270 x10^3/uL


(140-400)


 


Sodium Level


 


 


 


 139 mmol/L


(136-145)


 


Potassium Level


 


 


 


 4.6 mmol/L


(3.5-5.1)


 


Chloride Level


 


 


 


 104 mmol/L


()


 


Carbon Dioxide Level


 


 


 


 25 mmol/L


(21-32)


 


Anion Gap    10 (6-14) 


 


Blood Urea Nitrogen


 


 


 


 73 mg/dL


(7-20)


 


Creatinine


 


 


 


 2.3 mg/dL


(0.6-1.0)


 


Estimated GFR


(Cockcroft-Gault) 


 


 


 22.5 





 


Glucose Level


 


 


 


 155 mg/dL


(70-99)


 


Calcium Level


 


 


 


 8.9 mg/dL


(8.5-10.1)


 


Phosphorus Level


 


 


 


 5.8 mg/dL


(2.6-4.7)


 


Albumin


 


 


 


 2.9 g/dL


(3.4-5.0)


 


Test


 4/7/20


06:49 4/7/20


08:00 


 





 


Glucose (Fingerstick)


 145 mg/dL


(70-99) 


 


 





 


O2 Saturation  96 % (92-99)   


 


Arterial Blood pH


 


 7.30


(7.35-7.45) 


 





 


Arterial Blood pCO2 at


Patient Temp 


 45 mmHg


(35-46) 


 





 


Arterial Blood pO2 at Patient


Temp 


 95 mmHg


() 


 





 


Arterial Blood HCO3


 


 22 mmol/L


(21-28) 


 





 


Arterial Blood Base Excess


 


 -5 mmol/L


(-3-3) 


 





 


FiO2  40   











Objective:


Assessment:


 


Acute pancreatitis, early developing necrosis


Cholelithiasis


Leucocytosis improving


JED,Hyperkalemia, Metabolic acidosis on HD


Acute hypoxic resp failure ,bilateral pleural effusion


hypocalcemia 


Prediabetes


HTN





Plan:


Plan of Care


Continue Cefepime /Daptomycin (3/25), Flagyl and micafungin (3/23) 


   -Previously on Merrem, Zyvox 


f/u cults 


Maintain aspiration precautions 


COVID-19 neg, 3/26  


C diff negative 3/23


 Lt IJ DC'd


 


D/w nursing





Critically ill











IVAN FRANZ MD            Apr 7, 2020 09:15

## 2020-04-08 VITALS — SYSTOLIC BLOOD PRESSURE: 120 MMHG | DIASTOLIC BLOOD PRESSURE: 72 MMHG

## 2020-04-08 VITALS — DIASTOLIC BLOOD PRESSURE: 110 MMHG | SYSTOLIC BLOOD PRESSURE: 204 MMHG

## 2020-04-08 VITALS — SYSTOLIC BLOOD PRESSURE: 98 MMHG | DIASTOLIC BLOOD PRESSURE: 56 MMHG

## 2020-04-08 VITALS — DIASTOLIC BLOOD PRESSURE: 55 MMHG | SYSTOLIC BLOOD PRESSURE: 94 MMHG

## 2020-04-08 VITALS — DIASTOLIC BLOOD PRESSURE: 64 MMHG | SYSTOLIC BLOOD PRESSURE: 113 MMHG

## 2020-04-08 VITALS — SYSTOLIC BLOOD PRESSURE: 135 MMHG | DIASTOLIC BLOOD PRESSURE: 72 MMHG

## 2020-04-08 VITALS — SYSTOLIC BLOOD PRESSURE: 106 MMHG | DIASTOLIC BLOOD PRESSURE: 63 MMHG

## 2020-04-08 VITALS — DIASTOLIC BLOOD PRESSURE: 65 MMHG | SYSTOLIC BLOOD PRESSURE: 113 MMHG

## 2020-04-08 VITALS — SYSTOLIC BLOOD PRESSURE: 104 MMHG | DIASTOLIC BLOOD PRESSURE: 70 MMHG

## 2020-04-08 VITALS — SYSTOLIC BLOOD PRESSURE: 97 MMHG | DIASTOLIC BLOOD PRESSURE: 55 MMHG

## 2020-04-08 VITALS — SYSTOLIC BLOOD PRESSURE: 93 MMHG | DIASTOLIC BLOOD PRESSURE: 55 MMHG

## 2020-04-08 VITALS — SYSTOLIC BLOOD PRESSURE: 114 MMHG | DIASTOLIC BLOOD PRESSURE: 63 MMHG

## 2020-04-08 VITALS — DIASTOLIC BLOOD PRESSURE: 48 MMHG | SYSTOLIC BLOOD PRESSURE: 83 MMHG

## 2020-04-08 VITALS — DIASTOLIC BLOOD PRESSURE: 93 MMHG | SYSTOLIC BLOOD PRESSURE: 199 MMHG

## 2020-04-08 VITALS — SYSTOLIC BLOOD PRESSURE: 102 MMHG | DIASTOLIC BLOOD PRESSURE: 49 MMHG

## 2020-04-08 VITALS — SYSTOLIC BLOOD PRESSURE: 94 MMHG | DIASTOLIC BLOOD PRESSURE: 50 MMHG

## 2020-04-08 VITALS — DIASTOLIC BLOOD PRESSURE: 67 MMHG | SYSTOLIC BLOOD PRESSURE: 104 MMHG

## 2020-04-08 VITALS — DIASTOLIC BLOOD PRESSURE: 72 MMHG | SYSTOLIC BLOOD PRESSURE: 116 MMHG

## 2020-04-08 VITALS — SYSTOLIC BLOOD PRESSURE: 129 MMHG | DIASTOLIC BLOOD PRESSURE: 70 MMHG

## 2020-04-08 VITALS — SYSTOLIC BLOOD PRESSURE: 119 MMHG | DIASTOLIC BLOOD PRESSURE: 67 MMHG

## 2020-04-08 VITALS — DIASTOLIC BLOOD PRESSURE: 61 MMHG | SYSTOLIC BLOOD PRESSURE: 115 MMHG

## 2020-04-08 VITALS — DIASTOLIC BLOOD PRESSURE: 68 MMHG | SYSTOLIC BLOOD PRESSURE: 122 MMHG

## 2020-04-08 VITALS — DIASTOLIC BLOOD PRESSURE: 87 MMHG | SYSTOLIC BLOOD PRESSURE: 137 MMHG

## 2020-04-08 VITALS — DIASTOLIC BLOOD PRESSURE: 66 MMHG | SYSTOLIC BLOOD PRESSURE: 114 MMHG

## 2020-04-08 VITALS — SYSTOLIC BLOOD PRESSURE: 116 MMHG | DIASTOLIC BLOOD PRESSURE: 62 MMHG

## 2020-04-08 VITALS — SYSTOLIC BLOOD PRESSURE: 132 MMHG | DIASTOLIC BLOOD PRESSURE: 87 MMHG

## 2020-04-08 VITALS — DIASTOLIC BLOOD PRESSURE: 62 MMHG | SYSTOLIC BLOOD PRESSURE: 112 MMHG

## 2020-04-08 LAB
ALBUMIN SERPL-MCNC: 2.3 G/DL (ref 3.4–5)
ANION GAP SERPL CALC-SCNC: 9 MMOL/L (ref 6–14)
BASE EXCESS ABG: -6 MMOL/L (ref -3–3)
BUN SERPL-MCNC: 61 MG/DL (ref 7–20)
CALCIUM SERPL-MCNC: 8.7 MG/DL (ref 8.5–10.1)
CHLORIDE SERPL-SCNC: 102 MMOL/L (ref 98–107)
CO2 SERPL-SCNC: 24 MMOL/L (ref 21–32)
CREAT SERPL-MCNC: 2 MG/DL (ref 0.6–1)
GFR SERPLBLD BASED ON 1.73 SQ M-ARVRAT: 26.5 ML/MIN
GLUCOSE SERPL-MCNC: 187 MG/DL (ref 70–99)
HCO3 BLDA-SCNC: 19 MMOL/L (ref 21–28)
INSPIRATION SETTING TIME VENT: 40
MAGNESIUM SERPL-MCNC: 1.9 MG/DL (ref 1.8–2.4)
PCO2 BLDA: 36 MMHG (ref 35–46)
PHOSPHATE SERPL-MCNC: 4.8 MG/DL (ref 2.6–4.7)
PO2 BLDA: 242 MMHG (ref 75–108)
POTASSIUM SERPL-SCNC: 4.9 MMOL/L (ref 3.5–5.1)
SAO2 % BLDA: 99 % (ref 92–99)
SODIUM SERPL-SCNC: 135 MMOL/L (ref 136–145)

## 2020-04-08 RX ADMIN — ALBUMIN (HUMAN) PRN MLS/HR: 0.25 INJECTION, SOLUTION INTRAVENOUS at 09:09

## 2020-04-08 RX ADMIN — Medication PRN EACH: at 13:23

## 2020-04-08 RX ADMIN — MEROPENEM SCH MLS/HR: 500 INJECTION, POWDER, FOR SOLUTION INTRAVENOUS at 21:51

## 2020-04-08 RX ADMIN — INSULIN LISPRO SCH UNITS: 100 INJECTION, SOLUTION INTRAVENOUS; SUBCUTANEOUS at 06:26

## 2020-04-08 RX ADMIN — MIDAZOLAM HYDROCHLORIDE PRN MLS/HR: 5 INJECTION, SOLUTION INTRAMUSCULAR; INTRAVENOUS at 15:35

## 2020-04-08 RX ADMIN — DEXMEDETOMIDINE HYDROCHLORIDE PRN MLS/HR: 100 INJECTION, SOLUTION, CONCENTRATE INTRAVENOUS at 18:34

## 2020-04-08 RX ADMIN — DEXMEDETOMIDINE HYDROCHLORIDE PRN MLS/HR: 100 INJECTION, SOLUTION, CONCENTRATE INTRAVENOUS at 00:56

## 2020-04-08 RX ADMIN — DEXMEDETOMIDINE HYDROCHLORIDE PRN MLS/HR: 100 INJECTION, SOLUTION, CONCENTRATE INTRAVENOUS at 08:25

## 2020-04-08 RX ADMIN — AMIODARONE HYDROCHLORIDE PRN MLS/HR: 50 INJECTION, SOLUTION INTRAVENOUS at 13:45

## 2020-04-08 RX ADMIN — MIDAZOLAM HYDROCHLORIDE PRN MLS/HR: 5 INJECTION, SOLUTION INTRAMUSCULAR; INTRAVENOUS at 00:04

## 2020-04-08 RX ADMIN — AMIODARONE HYDROCHLORIDE PRN MLS/HR: 50 INJECTION, SOLUTION INTRAVENOUS at 08:25

## 2020-04-08 RX ADMIN — DEXMEDETOMIDINE HYDROCHLORIDE PRN MLS/HR: 100 INJECTION, SOLUTION, CONCENTRATE INTRAVENOUS at 21:50

## 2020-04-08 RX ADMIN — DEXTROSE SCH MLS/HR: 50 INJECTION, SOLUTION INTRAVENOUS at 14:28

## 2020-04-08 RX ADMIN — DAPTOMYCIN SCH MLS/HR: 500 INJECTION, POWDER, LYOPHILIZED, FOR SOLUTION INTRAVENOUS at 14:02

## 2020-04-08 RX ADMIN — DEXMEDETOMIDINE HYDROCHLORIDE PRN MLS/HR: 100 INJECTION, SOLUTION, CONCENTRATE INTRAVENOUS at 15:36

## 2020-04-08 RX ADMIN — INSULIN LISPRO SCH UNITS: 100 INJECTION, SOLUTION INTRAVENOUS; SUBCUTANEOUS at 13:25

## 2020-04-08 RX ADMIN — PANTOPRAZOLE SODIUM SCH MG: 40 INJECTION, POWDER, FOR SOLUTION INTRAVENOUS at 08:30

## 2020-04-08 RX ADMIN — MEROPENEM SCH MLS/HR: 500 INJECTION, POWDER, FOR SOLUTION INTRAVENOUS at 13:30

## 2020-04-08 RX ADMIN — HEPARIN SODIUM SCH UNIT: 5000 INJECTION, SOLUTION INTRAVENOUS; SUBCUTANEOUS at 13:22

## 2020-04-08 RX ADMIN — HEPARIN SODIUM SCH UNIT: 5000 INJECTION, SOLUTION INTRAVENOUS; SUBCUTANEOUS at 20:59

## 2020-04-08 RX ADMIN — DEXMEDETOMIDINE HYDROCHLORIDE PRN MLS/HR: 100 INJECTION, SOLUTION, CONCENTRATE INTRAVENOUS at 04:14

## 2020-04-08 RX ADMIN — INSULIN LISPRO SCH UNITS: 100 INJECTION, SOLUTION INTRAVENOUS; SUBCUTANEOUS at 17:48

## 2020-04-08 NOTE — NUR
Patient left to go to dialysis on Levophed 0.08, came back from Dialysis on 0.25 of 
Levophed. IV spreadsheet does not reflect this. Started titrating levo down at 1315. Will 
continue to monitor.

## 2020-04-08 NOTE — PDOC
PROGRESS NOTES


Assessment


Assessment


Respiratory failure.


Seizure.


Metabolic encephalopathy.


Fever 102.7 degree, recurrent fever 100.6 degree on 4/8/20.


Metabolic acidosis.


Diffuse pulmonary infiltrate.


Pleural effusion.


Pancreatitis


Gallstone.


Leukocytosis.


Lymphopenia.


Electrolytes imbalances.


Hyperglycemia.


DM.


HTN.


HLD.


Anemia.


Abnormal CXR.


Obesity.


Covid-19 suspected.





RECOMMENDATIONS/PLAN:


Continue life support in PACU at the present time.


Keppra if has further seizures.


Treat medical diseases.


Suggested repeat Covid-19 lab test.


OT/PT.





EEG last week: No seizure activity.





OBJECTIVE:


Fever 100.6 degree.


4/8/20: No seizures reported over night.





Past Medical History


Cardiovascular:  HTN, Hyperlipidemia


Endocrine:  Diabetes





Family History


Unobtainable.





Social HistoryU


not obtainable





Allergies


Coded Allergies:  


Codeine (Verified  Allergy, Intermediate, rash, 3/16/20)





ROS


Unobtainable in unresponsive state.





NEUROLOGICAL  EXAMINATION:


Decreased responsiveness.


Not oriented to time, place and person.


PERRL.


EOMI not active.


CN: no acute focal findings.


No focalized findings per reports.


Patient went to dialysis.





Objective


Objective





Vital Signs








  Date Time  Temp Pulse Resp B/P (MAP) Pulse Ox O2 Delivery O2 Flow Rate FiO2


 


4/8/20 13:29   18  99 Ventilator  


 


4/8/20 08:00 100.6 106  116/62 (80)    





 100.6       


 


4/7/20 13:28       6.0 














Intake and Output 


 


 4/8/20





 07:00


 


Intake Total 2496.57 ml


 


Output Total 575 ml


 


Balance 1921.57 ml


 


 


 


IV Total 2496.57 ml


 


Output Urine Total 525 ml


 


Gastric Drainage Total 50 ml











Vitals Signs


Vitals





                          VS - Last 72 Hours, by Label








  Date Time  Temp Pulse Resp B/P (MAP) Pulse Ox O2 Delivery O2 Flow Rate FiO2


 


4/8/20 13:29   18  99 Ventilator  


 


4/8/20 11:54     100 Ventilator  


 


4/8/20 08:00     99 Ventilator  


 


4/8/20 08:00      Mechanical Ventilator  


 


4/8/20 08:00 100.6 106 18 116/62 (80) 98 Ventilator  





 100.6       


 


4/8/20 07:00  104 18 112/62 (79) 99 Ventilator  


 


4/8/20 06:00  104 19 135/72 (93) 98 Ventilator  


 


4/8/20 05:25     98 Ventilator  


 


4/8/20 05:00  104 19 119/67 (84) 99 Ventilator  


 


4/8/20 04:55     99   


 


4/8/20 04:06     99 Ventilator  


 


4/8/20 04:00      Mechanical Ventilator  


 


4/8/20 04:00 100.2 104 18 113/64 (80) 99 Ventilator  





 100.2       


 


4/8/20 03:00  104 18 116/72 (87) 99 Ventilator  


 


4/8/20 02:00  104 18 115/61 (79) 99 Ventilator  


 


4/8/20 01:36     100 Ventilator  


 


4/8/20 01:00  107 18 129/70 (89) 97 Ventilator  


 


4/8/20 00:00 99.1 103 18 114/63 (80) 99 Ventilator  





 99.1       


 


4/8/20 00:00      Mechanical Ventilator  


 


4/7/20 23:49     100 Ventilator  


 


4/7/20 23:00  100 18 109/62 (78) 98 Ventilator  


 


4/7/20 22:05     98 Ventilator  


 


4/7/20 22:00  105 18 137/76 (96) 99 Ventilator  


 


4/7/20 21:12     99   


 


4/7/20 21:08     98 Ventilator  


 


4/7/20 21:00  103 17 126/79 (95) 99 Ventilator  


 


4/7/20 20:42     99 Ventilator  


 


4/7/20 20:00      Mechanical Ventilator  


 


4/7/20 20:00 98.3 98 18 140/92 (108) 99 Ventilator  





 98.3       


 


4/7/20 19:00  98 18 142/90 (107) 99 Ventilator  


 


4/7/20 18:00  100 18 137/85 (102) 96 Ventilator  


 


4/7/20 17:30  106 18 156/89 (111) 98 Ventilator  


 


4/7/20 17:00  105 18 147/80 (102) 97 Ventilator  


 


4/7/20 16:40      Mechanical Ventilator  


 


4/7/20 16:11     98 Ventilator  


 


4/7/20 13:58   19  98 Ventilator  


 


4/7/20 13:28     98 Ventilator 6.0 


 


4/7/20 12:00      Mechanical Ventilator  


 


4/7/20 12:00 98.0 86 18 110/54 (72) 97 Ventilator  





 98.0       


 


4/7/20 11:49     98 Ventilator  


 


4/7/20 11:00  86 18 105/60 (75) 97 Ventilator  


 


4/7/20 10:00  86 18 107/61 (76) 97 Ventilator  


 


4/7/20 09:00 97.0 84 18 99/65 (76) 97 Ventilator  





 97.0       


 


4/7/20 08:00      Mechanical Ventilator  


 


4/7/20 08:00  86 18 117/71 (86) 98 Ventilator  


 


4/7/20 07:49     99 Ventilator  


 


4/7/20 07:00  86 18 112/68 (83) 98 Ventilator  











Laboratory


Laboratory





Laboratory Tests








Test


 4/7/20


18:03 4/7/20


23:40 4/8/20


06:10 4/8/20


06:17


 


Glucose (Fingerstick)


 134 mg/dL


(70-99) 167 mg/dL


(70-99) 


 182 mg/dL


(70-99)


 


Sodium Level


 


 


 135 mmol/L


(136-145) 





 


Potassium Level


 


 


 4.9 mmol/L


(3.5-5.1) 





 


Chloride Level


 


 


 102 mmol/L


() 





 


Carbon Dioxide Level


 


 


 24 mmol/L


(21-32) 





 


Anion Gap   9 (6-14)  


 


Blood Urea Nitrogen


 


 


 61 mg/dL


(7-20) 





 


Creatinine


 


 


 2.0 mg/dL


(0.6-1.0) 





 


Estimated GFR


(Cockcroft-Gault) 


 


 26.5 


 





 


Glucose Level


 


 


 187 mg/dL


(70-99) 





 


Calcium Level


 


 


 8.7 mg/dL


(8.5-10.1) 





 


Phosphorus Level


 


 


 4.8 mg/dL


(2.6-4.7) 





 


Magnesium Level


 


 


 1.9 mg/dL


(1.8-2.4) 





 


Albumin


 


 


 2.3 g/dL


(3.4-5.0) 





 


Test


 4/8/20


08:25 4/8/20


13:22 


 





 


O2 Saturation 99 % (92-99)    


 


Arterial Blood pH


 7.34


(7.35-7.45) 


 


 





 


Arterial Blood pCO2 at


Patient Temp 36 mmHg


(35-46) 


 


 





 


Arterial Blood pO2 at Patient


Temp 242 mmHg


() 


 


 





 


Arterial Blood HCO3


 19 mmol/L


(21-28) 


 


 





 


Arterial Blood Base Excess


 -6 mmol/L


(-3-3) 


 


 





 


FiO2 40    


 


Glucose (Fingerstick)


 


 153 mg/dL


(70-99) 


 











Microbiology


4/5/20 Blood Culture - Preliminary, Resulted


         NO GROWTH AFTER 3 DAYS


4/4/20 Urine Culture - Final, Complete


         


4/4/20 Urine Culture Result 1 ([facility]) - Final, Complete





Medication


Medications





Current Medications


Albumin Human 50 ml @ 50 mls/hr 1X  ONCE IV ;  Start 4/8/20 at 08:53;  Stop 

4/8/20 at 08:56;  Status DC


Albumin Human 200 ml @  50 mls/hr PRN 1X  PRN IV HYPOTENSION Last administered 

on 4/8/20at 09:09;  Start 4/8/20 at 09:00


Albumin Human 200 ml @  200 mls/hr 1X  ONCE IV ;  Start 4/8/20 at 08:00;  Stop 

4/8/20 at 08:53;  Status DC


Diphenhydramine HCl (Benadryl) 25 mg 1X PRN  PRN IV ITCHING;  Start 4/8/20 at 

08:00;  Stop 4/9/20 at 07:59


Diphenhydramine HCl (Benadryl) 25 mg 1X PRN  PRN IV ITCHING;  Start 4/8/20 at 

08:00;  Stop 4/9/20 at 07:59


Info (PHARMACY MONITORING -- do not chart) 1 each PRN DAILY  PRN MC SEE 

COMMENTS;  Start 4/8/20 at 08:00


Meropenem 500 mg/ Sodium Chloride 50 ml @  100 mls/hr Q12H IV  Last administered

on 4/8/20at 13:30;  Start 4/8/20 at 10:00


Sodium Chloride 1,000 ml @  1,000 mls/hr Q1H PRN IV hypotension;  Start 4/8/20 

at 07:50;  Stop 4/8/20 at 13:49


Sodium Chloride 90 meq/Magnesium Sulfate 12 meq/ Calcium Gluconate 15 meq/ 

Multivitamins 10 ml/Chromium/ Copper/Manganese/ Seleni/Zn 0.5 ml/ Insulin Human 

Regular 25 unit/ Total Parenteral Nutrition/Amino Acids/Dextrose/ Fat Emulsion 

Intravenous 1,400 ml @  58.333 mls/ hr TPN  CONT IV ;  Start 4/8/20 at 22:00;  

Stop 4/9/20 at 21:59


Sodium Chloride 90 meq/Potassium Chloride 15 meq/ Magnesium Sulfate 12 

meq/Calcium Gluconate 15 meq/ Multivitamins 10 ml/Chromium/ Copper/Manganese/ 

Seleni/Zn 0.5 ml/ Insulin Human Regular 25 unit/ Total Parenteral 

Nutrition/Amino Acids/Dextrose/ Fat Emulsion Intravenous 1,400 ml @  58.333 mls/

hr TPN  CONT IV  Last administered on 4/7/20at 22:13;  Start 4/7/20 at 22:00;  

Stop 4/8/20 at 21:59





Comment


Review of Relevant


I have reviewed the following items josy (where applicable) has been applied.











DORYS LANDA MD                  Apr 8, 2020 13:45

## 2020-04-08 NOTE — PDOC
SURGICAL PROGRESS NOTE


Subjective


Pt intubated and sedated, currently on dialysis


Vital Signs





Vital Signs








  Date Time  Temp Pulse Resp B/P (MAP) Pulse Ox O2 Delivery O2 Flow Rate FiO2


 


4/8/20 08:00     99 Ventilator  


 


4/8/20 08:00 100.6 106 18 116/62 (80)    





 100.6       


 


4/7/20 13:28       6.0 








I&O











Intake and Output 


 


 4/8/20





 07:00


 


Intake Total 2496.57 ml


 


Output Total 575 ml


 


Balance 1921.57 ml


 


 


 


IV Total 2496.57 ml


 


Output Urine Total 525 ml


 


Gastric Drainage Total 50 ml








General:  No acute distress


Abdomen:  Soft, No tenderness


Labs





Laboratory Tests








Test


 4/6/20


11:21 4/6/20


19:07 4/7/20


00:27 4/7/20


06:45


 


Glucose (Fingerstick)


 169 mg/dL


(70-99) 182 mg/dL


(70-99) 170 mg/dL


(70-99) 





 


White Blood Count


 


 


 


 11.0 x10^3/uL


(4.0-11.0)


 


Red Blood Count


 


 


 


 2.50 x10^6/uL


(3.50-5.40)


 


Hemoglobin


 


 


 


 8.0 g/dL


(12.0-15.5)


 


Hematocrit


 


 


 


 24.9 %


(36.0-47.0)


 


Mean Corpuscular Volume    99 fL () 


 


Mean Corpuscular Hemoglobin    32 pg (25-35) 


 


Mean Corpuscular Hemoglobin


Concent 


 


 


 32 g/dL


(31-37)


 


Red Cell Distribution Width


 


 


 


 18.9 %


(11.5-14.5)


 


Platelet Count


 


 


 


 270 x10^3/uL


(140-400)


 


Sodium Level


 


 


 


 139 mmol/L


(136-145)


 


Potassium Level


 


 


 


 4.6 mmol/L


(3.5-5.1)


 


Chloride Level


 


 


 


 104 mmol/L


()


 


Carbon Dioxide Level


 


 


 


 25 mmol/L


(21-32)


 


Anion Gap    10 (6-14) 


 


Blood Urea Nitrogen


 


 


 


 73 mg/dL


(7-20)


 


Creatinine


 


 


 


 2.3 mg/dL


(0.6-1.0)


 


Estimated GFR


(Cockcroft-Gault) 


 


 


 22.5 





 


Glucose Level


 


 


 


 155 mg/dL


(70-99)


 


Calcium Level


 


 


 


 8.9 mg/dL


(8.5-10.1)


 


Phosphorus Level


 


 


 


 5.8 mg/dL


(2.6-4.7)


 


Albumin


 


 


 


 2.9 g/dL


(3.4-5.0)


 


Test


 4/7/20


06:49 4/7/20


08:00 4/7/20


18:03 4/7/20


23:40


 


Glucose (Fingerstick)


 145 mg/dL


(70-99) 


 134 mg/dL


(70-99) 167 mg/dL


(70-99)


 


O2 Saturation  96 % (92-99)   


 


Arterial Blood pH


 


 7.30


(7.35-7.45) 


 





 


Arterial Blood pCO2 at


Patient Temp 


 45 mmHg


(35-46) 


 





 


Arterial Blood pO2 at Patient


Temp 


 95 mmHg


() 


 





 


Arterial Blood HCO3


 


 22 mmol/L


(21-28) 


 





 


Arterial Blood Base Excess


 


 -5 mmol/L


(-3-3) 


 





 


FiO2  40   


 


Test


 4/8/20


06:10 4/8/20


06:17 4/8/20


08:25 





 


Sodium Level


 135 mmol/L


(136-145) 


 


 





 


Potassium Level


 4.9 mmol/L


(3.5-5.1) 


 


 





 


Chloride Level


 102 mmol/L


() 


 


 





 


Carbon Dioxide Level


 24 mmol/L


(21-32) 


 


 





 


Anion Gap 9 (6-14)    


 


Blood Urea Nitrogen


 61 mg/dL


(7-20) 


 


 





 


Creatinine


 2.0 mg/dL


(0.6-1.0) 


 


 





 


Estimated GFR


(Cockcroft-Gault) 26.5 


 


 


 





 


Glucose Level


 187 mg/dL


(70-99) 


 


 





 


Calcium Level


 8.7 mg/dL


(8.5-10.1) 


 


 





 


Phosphorus Level


 4.8 mg/dL


(2.6-4.7) 


 


 





 


Magnesium Level


 1.9 mg/dL


(1.8-2.4) 


 


 





 


Albumin


 2.3 g/dL


(3.4-5.0) 


 


 





 


Glucose (Fingerstick)


 


 182 mg/dL


(70-99) 


 





 


O2 Saturation   99 % (92-99)  


 


Arterial Blood pH


 


 


 7.34


(7.35-7.45) 





 


Arterial Blood pCO2 at


Patient Temp 


 


 36 mmHg


(35-46) 





 


Arterial Blood pO2 at Patient


Temp 


 


 242 mmHg


() 





 


Arterial Blood HCO3


 


 


 19 mmol/L


(21-28) 





 


Arterial Blood Base Excess


 


 


 -6 mmol/L


(-3-3) 





 


FiO2   40  








Laboratory Tests








Test


 4/7/20


18:03 4/7/20


23:40 4/8/20


06:10 4/8/20


06:17


 


Glucose (Fingerstick)


 134 mg/dL


(70-99) 167 mg/dL


(70-99) 


 182 mg/dL


(70-99)


 


Sodium Level


 


 


 135 mmol/L


(136-145) 





 


Potassium Level


 


 


 4.9 mmol/L


(3.5-5.1) 





 


Chloride Level


 


 


 102 mmol/L


() 





 


Carbon Dioxide Level


 


 


 24 mmol/L


(21-32) 





 


Anion Gap   9 (6-14)  


 


Blood Urea Nitrogen


 


 


 61 mg/dL


(7-20) 





 


Creatinine


 


 


 2.0 mg/dL


(0.6-1.0) 





 


Estimated GFR


(Cockcroft-Gault) 


 


 26.5 


 





 


Glucose Level


 


 


 187 mg/dL


(70-99) 





 


Calcium Level


 


 


 8.7 mg/dL


(8.5-10.1) 





 


Phosphorus Level


 


 


 4.8 mg/dL


(2.6-4.7) 





 


Magnesium Level


 


 


 1.9 mg/dL


(1.8-2.4) 





 


Albumin


 


 


 2.3 g/dL


(3.4-5.0) 





 


Test


 4/8/20


08:25 


 


 





 


O2 Saturation 99 % (92-99)    


 


Arterial Blood pH


 7.34


(7.35-7.45) 


 


 





 


Arterial Blood pCO2 at


Patient Temp 36 mmHg


(35-46) 


 


 





 


Arterial Blood pO2 at Patient


Temp 242 mmHg


() 


 


 





 


Arterial Blood HCO3


 19 mmol/L


(21-28) 


 


 





 


Arterial Blood Base Excess


 -6 mmol/L


(-3-3) 


 


 





 


FiO2 40    








Problem List


Problems


Medical Problems:


(1) Acute pancreatitis


Status: Acute  





(2) Cholelithiasis


Status: Acute  








Assessment/Plan


severe pancreatitis


appears relatively stable


having fevers, defer to ID











GAMAL ZHOU MD              Apr 8, 2020 09:58

## 2020-04-08 NOTE — NUR
Pharmacy TPN Dosing Note



S: JESENIA RICH is a 49 year old F Currently receiving Central Continuous TPN started 
03/18/20



B:Pertinent PMH: Necrotizing pancreatitis



Height: 5 feet, 8 inches

Weight: 109.0 kg



Current diet: NPO 



LABS:

Sodium:    135 

Potassium: 4.9 

Chloride:  102 

Calcium:   8.7 

Corrected Calcium: 8.06 

Magnesium: 1.9 

CO2:       24 

SCr:       2 

Glucose:   187, 182 

Albumin:   4.8 

AST:       47 

ALT:       20 



TPN FORMULA:

TPN TYPE:  Central Continuous

AMINO ACIDS:         125 gm

DEXTROSE:            195 gm

LIPIDS:              40 gm

SODIUM CHLORIDE:     90 mEq

MAGNESIUM:           12 mEq

CALCIUM:             15 mEq

INSULIN:             25 units

MULTIPLE VITAMIN:    10 ml

TRACE ELEMENTS:      0.5 ml



TPN PLAN:  

-Serum K+ trending up despite HD yesterday, remove KCl from TPN.

-Pt continues to have anasarca and 3rd spacing, however TPN cannot be further concentrated 
due to macronutrient components.

-Renal panel tomorrow per nephrology.





R: Continue TPN @ current rate and above formula. 

Will monitor electrolytes, glucose, and tolerance to TPN.



 VALERIE SNELL [facility], 04/08/20 [id number]

## 2020-04-08 NOTE — PDOC
Infectious Disease Note


Subjective:


Subjective


Sedated and intubated,  


febrile again today


undergoing dialysis


TPN 


Rectal tube





Vital Signs:


Vital Signs





Vital Signs








  Date Time  Temp Pulse Resp B/P (MAP) Pulse Ox O2 Delivery O2 Flow Rate FiO2


 


4/8/20 08:00     99 Ventilator  


 


4/8/20 08:00 100.6 106 18 116/62 (80)    





 100.6       


 


4/7/20 13:28       6.0 











Physical Exam:


PHYSICAL EXAM


GENERAL: Orally intubated/sedated - generalized anasarca 


HEENT: Pupils equal, ETT, dobhoff +


NECK:  Supple 


LUNGS: Diminished aeration bases 


HEART:  S1, S2, regular 


ABDOMEN:  Distended, hypoactive BS, Rectal tube in place 


: [place]   


EXTREMITIES: Generalized edema, no cyanosis - some mottling, [place] boots & SCDs 

bilaterally 


DERMATOLOGIC:  Warm and dry.  No generalized rash.  


CENTRAL NERVOUS SYSTEM: Sedated 


RIJ Temp HDC, RUE-PICC  


Lt IJ removed 4/7


Chestwall line present





Medications:


Inpatient Meds:





Current Medications








 Medications


  (Trade)  Dose


 Ordered  Sig/Yee  Start Time


 Stop Time Status Last Admin


Dose Admin


 


 Acetaminophen


  (Tylenol Supp)  650 mg  PRN Q6HRS  PRN  3/24/20 10:30


    3/24/20 10:37


650 MG


 


 Acetaminophen


  (Tylenol)  650 mg  PRN Q6HRS  PRN  3/21/20 03:36


    3/26/20 07:35


650 MG


 


 Albumin Human  200 ml @ 


 50 mls/hr  PRN 1X  PRN  4/8/20 09:00


    4/8/20 09:09


50 MLS/HR


 


 Albuterol Sulfate


  (Ventolin Neb


 Soln)  2.5 mg  1X  ONCE  3/17/20 22:30


 3/17/20 22:31 DC 3/18/20 00:56


2.5 MG


 


 Alteplase,


 Recombinant


  (Cathflo For


 Central Catheter


 Clearance)  1 mg  1X  ONCE  3/31/20 22:00


 3/31/20 22:01 DC 3/31/20 22:05


1 MG


 


 Artificial Tears


  (Artificial


 Tears)  1 drop  PRN Q1HR  PRN  3/23/20 08:15


     





 


 Atenolol


  (Tenormin)  100 mg  DAILY  3/17/20 09:00


 3/16/20 20:08 DC  





 


 Atropine Sulfate


  (ATROPINE 0.5mg


 SYRINGE)  0.5 mg  PRN Q5MIN  PRN  4/2/20 08:15


     





 


 Benzocaine


  (Hurricaine One)  1 spray  1X  ONCE  3/20/20 14:30


 3/20/20 14:31 DC 3/20/20 16:38


1 SPRAY


 


 Bupivacaine HCl/


 Epinephrine Bitart


  (Sensorcain-Epi


 0.5%-1:966791 Mpf)  30 ml  STK-MED ONCE  4/6/20 11:00


 4/6/20 11:01 DC 4/6/20 11:44


1 ML


 


 Calcium Carbonate/


 Glycine


  (Tums)  500 mg  PRN AFTMEALHC  PRN  3/18/20 17:45


     





 


 Calcium Chloride


 1000 mg/Sodium


 Chloride  110 ml @ 


 220 mls/hr  1X  ONCE  3/17/20 22:30


 3/17/20 22:59 DC 3/17/20 22:11


220 MLS/HR


 


 Calcium Chloride


 3000 mg/Sodium


 Chloride  1,030 ml @ 


 50 mls/hr  [id number]  3/19/20 08:00


 3/21/20 15:23 DC 3/21/20 02:17


50 MLS/HR


 


 Calcium Gluconate


  (Calcium


 Gluconate)  2,000 mg  1X  ONCE  3/19/20 02:15


 3/19/20 02:16 DC 3/19/20 02:19


2,000 MG


 


 Calcium Gluconate


 1000 mg/Sodium


 Chloride  110 ml @ 


 220 mls/hr  1X  ONCE  3/18/20 03:30


 3/18/20 03:59 DC 3/18/20 03:21


220 MLS/HR


 


 Calcium Gluconate


 2000 mg/Sodium


 Chloride  120 ml @ 


 220 mls/hr  1X  ONCE  3/18/20 07:30


 3/18/20 08:02 DC 3/18/20 09:05


220 MLS/HR


 


 Cefepime HCl


  (Maxipime)  2 gm  Q12HR  3/25/20 09:00


    4/7/20 20:56


2 GM


 


 Cellulose


  (Surgicel


 Fibrillar 1x2)  1 each  STK-MED ONCE  4/6/20 11:00


 4/6/20 11:01 DC  





 


 Cellulose


  (Surgicel


 Hemostat 4x8)  1 each  STK-MED ONCE  4/6/20 11:55


 4/6/20 11:56 DC 4/6/20 11:44


1 EACH


 


 Daptomycin 500 mg/


 Sodium Chloride  50 ml @ 


 100 mls/hr  Q48H  3/25/20 08:30


    4/6/20 08:35


100 MLS/HR


 


 Dexmedetomidine


 HCl 400 mcg/


 Sodium Chloride  100 ml @ 0


 mls/hr  CONT  PRN  4/2/20 08:15


    4/8/20 08:25


27.6 MLS/HR


 


 Dextrose


  (Dextrose


 50%-Water Syringe)  12.5 gm  PRN Q15MIN  PRN  3/16/20 09:30


     





 


 Digoxin


  (Lanoxin)  125 mcg  1X  ONCE  3/19/20 18:00


 3/19/20 18:01 DC 3/19/20 17:10


125 MCG


 


 Diphenhydramine


 HCl


  (Benadryl)  25 mg  1X PRN  PRN  4/8/20 08:00


 4/9/20 07:59   





 


 Etomidate


  (Amidate)  8 mg  1X  ONCE  3/23/20 08:30


 3/23/20 08:31 DC 3/23/20 08:33


8 MG


 


 Fentanyl Citrate


  (Fentanyl 2ml


 Vial)  50 mcg  PRN Q5MIN  PRN  4/6/20 07:00


 4/7/20 06:59 DC  





 


 Furosemide


  (Lasix)  20 mg  1X  ONCE  4/2/20 08:15


 4/2/20 08:16 DC 4/2/20 08:19


20 MG


 


 Heparin Sodium


  (Porcine)


  (Hep Lock Adult)  500 unit  STK-MED ONCE  4/7/20 09:29


 4/7/20 09:30 DC  





 


 Heparin Sodium


  (Porcine)


  (Heparin Sodium)  5,000 unit  Q12HR  4/3/20 21:00


    4/7/20 20:56


5,000 UNIT


 


 Hydromorphone HCl


  (Dilaudid)  1 mg  PRN Q3HRS  PRN  3/17/20 12:00


 3/31/20 00:25 DC 3/23/20 05:13


1 MG


 


 Info


  (CONTRAST GIVEN


 -- Rx MONITORING)  1 each  PRN DAILY  PRN  3/30/20 11:45


 4/1/20 11:44 DC  





 


 Info


  (Icu Electrolyte


 Protocol)  1 ea  CONT PRN  PRN  3/29/20 13:15


     





 


 Info


  (PHARMACY


 MONITORING -- do


 not chart)  1 each  PRN DAILY  PRN  4/8/20 08:00


     





 


 Info


  (Tpn Per


 Pharmacy)  1 each  PRN DAILY  PRN  3/18/20 12:30


   UNV  





 


 Insulin Human


 Lispro


  (HumaLOG)  0-9 UNITS  Q6HRS  3/16/20 09:30


    4/8/20 06:26


4 UNITS


 


 Insulin Human


 Regular


  (HumuLIN R VIAL)  5 unit  1X  ONCE  3/17/20 22:30


 3/17/20 22:31 DC 3/17/20 22:14


5 UNIT


 


 Iohexol


  (Omnipaque 240


 Mg/ml)  30 ml  1X  ONCE  3/30/20 11:30


 3/30/20 11:33 DC 3/30/20 11:30


30 ML


 


 Iohexol


  (Omnipaque 300


 Mg/ml)  60 ml  1X  ONCE  3/17/20 17:00


 3/17/20 17:01 DC 3/17/20 17:20


60 ML


 


 Iohexol


  (Omnipaque 350


 Mg/ml)  90 ml  1X  ONCE  3/16/20 03:30


 3/16/20 03:31 DC 3/16/20 03:25


90 ML


 


 Ketorolac


 Tromethamine


  (Toradol 30mg


 Vial)  30 mg  1X  ONCE  3/16/20 03:00


 3/16/20 03:01 DC 3/16/20 02:54


30 MG


 


 Lidocaine HCl


  (Buffered


 Lidocaine 1%)  6 ml  1X  ONCE  4/2/20 09:00


 4/2/20 09:06 DC 4/2/20 09:05


6 ML


 


 Lidocaine HCl


  (Glydo


  (Lidocaine) Jelly)  1 thomas  1X  ONCE  3/20/20 14:30


 3/20/20 14:31 DC 3/20/20 16:38


1 THOMAS


 


 Lidocaine HCl


  (Xylocaine-Mpf


 1% 2ml Vial)  2 ml  PRN 1X  PRN  4/6/20 07:00


 4/7/20 06:59 DC  





 


 Linezolid/Dextrose  300 ml @ 


 300 mls/hr  Q12HR  3/20/20 20:00


 3/27/20 07:50 DC 3/26/20 21:04


300 MLS/HR


 


 Lorazepam


  (Ativan Inj)  1 mg  PRN Q4HRS  PRN  3/19/20 09:00


    3/23/20 00:34


1 MG


 


 Magnesium Sulfate  50 ml @ 25


 mls/hr  PRN DAILY  PRN  4/5/20 09:15


     





 


 Meropenem 1 gm/


 Sodium Chloride  100 ml @ 


 200 mls/hr  Q8HRS  3/17/20 20:00


 3/18/20 08:48 DC 3/18/20 05:45


200 MLS/HR


 


 Meropenem 500 mg/


 Sodium Chloride  50 ml @ 


 100 mls/hr  Q6HRS  3/24/20 09:00


 3/25/20 07:29 DC 3/25/20 06:00


100 MLS/HR


 


 Metoprolol


 Tartrate


  (Lopressor Vial)  5 mg  Q6HRS  3/17/20 10:15


 3/28/20 08:48 DC 3/26/20 00:12


5 MG


 


 Metronidazole  100 ml @ 


 100 mls/hr  Q6HRS  3/23/20 08:30


    4/8/20 06:26


100 MLS/HR


 


 Micafungin Sodium


 100 mg/Dextrose  100 ml @ 


 100 mls/hr  Q24H  3/23/20 09:00


    4/7/20 10:00


100 MLS/HR


 


 Midazolam HCl


  (Versed)  5 mg  1X  ONCE  3/23/20 08:30


 3/23/20 08:31 DC  





 


 Midazolam HCl 100


 mg/Sodium Chloride  100 ml @ 7


 mls/hr  CONT  PRN  3/28/20 16:00


    4/8/20 00:04


7 MLS/HR


 


 Midazolam HCl 50


 mg/Sodium Chloride  50 ml @ 0


 mls/hr  CONT  PRN  3/23/20 08:15


 3/28/20 15:59 DC 3/26/20 22:39


7 MLS/HR


 


 Morphine Sulfate


  (Morphine


 Sulfate)  2 mg  PRN Q2HR  PRN  3/16/20 05:00


 3/17/20 14:15 DC 3/17/20 12:26


2 MG


 


 Multi-Ingred


 Cream/Lotion/Oil/


 Oint


  (Artificial


 Tears Eye


 Ointment)  1 thomas  PRN Q1HR  PRN  3/25/20 17:30


    4/3/20 11:05


1 THOMAS


 


 Norepinephrine


 Bitartrate 8 mg/


 Dextrose  258 ml @ 


 17.299 mls/


 hr  CONT  PRN  3/17/20 15:30


    4/8/20 08:25


13.839 MLS/HR


 


 Ondansetron HCl


  (Zofran)  4 mg  PRN Q6HRS  PRN  4/6/20 07:00


 4/7/20 06:59 DC  





 


 Pantoprazole


 Sodium


  (PROTONIX VIAL


 for IV PUSH)  40 mg  DAILYAC  3/16/20 11:30


    4/8/20 08:30


40 MG


 


 Piperacillin Sod/


 Tazobactam Sod


 4.5 gm/Sodium


 Chloride  100 ml @ 


 200 mls/hr  1X  ONCE  3/16/20 06:00


 3/16/20 06:29 DC 3/16/20 05:44


200 MLS/HR


 


 Potassium


 Chloride 15 meq/


 Bicarbonate


 Dialysis Soln w/


 out KCl  5,007.5 ml


  @ 1,000 mls/


 hr  Q5H1M  3/29/20 20:00


 4/2/20 13:08 DC 4/1/20 18:14


1,000 MLS/HR


 


 Potassium


 Chloride 20 meq/


 Bicarbonate


 Dialysis Soln w/


 out KCl  5,010 ml @ 


 1,000 mls/hr  Q5H1M  3/25/20 16:00


 3/29/20 19:59 DC 3/29/20 14:54


1,000 MLS/HR


 


 Potassium


 Chloride/Water  100 ml @ 


 100 mls/hr  Q1H  3/24/20 11:00


 3/24/20 12:59 DC 3/24/20 12:12


100 MLS/HR


 


 Potassium


 Phosphate 20 mmol/


 Sodium Chloride  106.6667


 ml @ 


 51.667 m...  1X  ONCE  3/25/20 13:00


 3/25/20 15:03 DC 3/25/20 12:51


51.667 MLS/HR


 


 Prochlorperazine


 Edisylate


  (Compazine)  5 mg  PACU PRN  PRN  4/6/20 07:00


 4/7/20 06:59 DC  





 


 Propofol  20 ml @ As


 Directed  STK-MED ONCE  4/6/20 11:07


 4/6/20 11:07 DC  





 


 Ringer's Solution  1,000 ml @ 


 30 mls/hr  Q24H  4/6/20 07:00


 4/6/20 18:59 DC  





 


 Sevoflurane


  (Ultane)  60 ml  STK-MED ONCE  4/6/20 12:46


 4/6/20 12:46 DC  





 


 Sodium


 Bicarbonate 50


 meq/Sodium


 Chloride  1,050 ml @ 


 75 mls/hr  Q14H  3/18/20 07:30


 3/23/20 10:28 DC 3/22/20 21:10


75 MLS/HR


 


 Sodium Chloride  1,000 ml @ 


 1,000 mls/hr  Q1H PRN  4/8/20 07:50


 4/8/20 13:49   





 


 Sodium Chloride


  (Normal Saline


 Flush)  10 ml  1X PRN  PRN  4/3/20 07:30


 4/4/20 07:29 DC  





 


 Sodium Chloride


 90 meq/Calcium


 Gluconate 10 meq/


 Multivitamins 10


 ml/Chromium/


 Copper/Manganese/


 Seleni/Zn 0.5 ml/


 Total Parenteral


 Nutrition/Amino


 Acids/Dextrose/


 Fat Emulsion


 Intravenous  1,512 ml @ 


 63 mls/hr  TPN  CONT  3/18/20 22:00


 3/19/20 21:59 DC 3/18/20 22:06


63 MLS/HR


 


 Sodium Chloride


 90 meq/Calcium


 Gluconate 10 meq/


 Multivitamins 10


 ml/Chromium/


 Copper/Manganese/


 Seleni/Zn 1 ml/


 Total Parenteral


 Nutrition/Amino


 Acids/Dextrose/


 Fat Emulsion


 Intravenous  55.005 ml


  @ 2.292


 mls/hr  TPN  CONT  3/18/20 22:00


 3/18/20 12:33 DC  





 


 Sodium Chloride


 90 meq/Magnesium


 Sulfate 10 meq/


 Calcium Gluconate


 20 meq/


 Multivitamins 10


 ml/Chromium/


 Copper/Manganese/


 Seleni/Zn 0.5 ml/


 Total Parenteral


 Nutrition/Amino


 Acids/Dextrose/


 Fat Emulsion


 Intravenous  1,512 ml @ 


 63 mls/hr  TPN  CONT  3/19/20 22:00


 3/20/20 21:59 DC 3/19/20 22:25


63 MLS/HR


 


 Sodium Chloride


 90 meq/Potassium


 Chloride 15 meq/


 Magnesium Sulfate


 12 meq/Calcium


 Gluconate 15 meq/


 Multivitamins 10


 ml/Chromium/


 Copper/Manganese/


 Seleni/Zn 0.5 ml/


 Insulin Human


 Regular 25 unit/


 Total Parenteral


 Nutrition/Amino


 Acids/Dextrose/


 Fat Emulsion


 Intravenous  1,400 ml @ 


 58.333 mls/


 hr  TPN  CONT  4/7/20 22:00


 4/8/20 21:59  4/7/20 22:13


58.333 MLS/HR


 


 Sodium Chloride


 90 meq/Potassium


 Chloride 15 meq/


 Potassium


 Phosphate 10 mmol/


 Magnesium Sulfate


 8 meq/Calcium


 Gluconate 15 meq/


 Multivitamins 10


 ml/Chromium/


 Copper/Manganese/


 Seleni/Zn 0.5 ml/


 Insulin Human


 Regular 25 unit/


 Total Parenteral


 Nutrition/Amino


 Acids/Dextrose/


 Fat Emulsion


 Intravenous  1,400 ml @ 


 58.333 mls/


 hr  TPN  CONT  4/5/20 22:00


 4/6/20 21:59 DC 4/5/20 21:20


58.333 MLS/HR


 


 Sodium Chloride


 90 meq/Potassium


 Chloride 15 meq/


 Potassium


 Phosphate 10 mmol/


 Magnesium Sulfate


 10 meq/Calcium


 Gluconate 20 meq/


 Multivitamins 10


 ml/Chromium/


 Copper/Manganese/


 Seleni/Zn 0.5 ml/


 Total Parenteral


 Nutrition/Amino


 Acids/Dextrose/


 Fat Emulsion


 Intravenous  1,400 ml @ 


 58.333 mls/


 hr  TPN  CONT  3/23/20 22:00


 3/24/20 21:59 DC 3/23/20 21:42


58.333 MLS/HR


 


 Sodium Chloride


 90 meq/Potassium


 Chloride 15 meq/


 Potassium


 Phosphate 10 mmol/


 Magnesium Sulfate


 12 meq/Calcium


 Gluconate 15 meq/


 Multivitamins 10


 ml/Chromium/


 Copper/Manganese/


 Seleni/Zn 0.5 ml/


 Insulin Human


 Regular 25 unit/


 Total Parenteral


 Nutrition/Amino


 Acids/Dextrose/


 Fat Emulsion


 Intravenous  1,400 ml @ 


 58.333 mls/


 hr  TPN  CONT  4/6/20 22:00


 4/7/20 21:59 DC 4/6/20 22:24


58.333 MLS/HR


 


 Sodium Chloride


 90 meq/Potassium


 Chloride 15 meq/


 Potassium


 Phosphate 15 mmol/


 Magnesium Sulfate


 10 meq/Calcium


 Gluconate 15 meq/


 Multivitamins 10


 ml/Chromium/


 Copper/Manganese/


 Seleni/Zn 0.5 ml/


 Total Parenteral


 Nutrition/Amino


 Acids/Dextrose/


 Fat Emulsion


 Intravenous  1,400 ml @ 


 58.333 mls/


 hr  TPN  CONT  3/24/20 22:00


 3/25/20 21:59 DC 3/24/20 22:17


58.333 MLS/HR


 


 Sodium Chloride


 90 meq/Potassium


 Chloride 15 meq/


 Potassium


 Phosphate 15 mmol/


 Magnesium Sulfate


 10 meq/Calcium


 Gluconate 20 meq/


 Multivitamins 10


 ml/Chromium/


 Copper/Manganese/


 Seleni/Zn 0.5 ml/


 Total Parenteral


 Nutrition/Amino


 Acids/Dextrose/


 Fat Emulsion


 Intravenous  1,200 ml @ 


 50 mls/hr  TPN  CONT  3/22/20 22:00


 3/22/20 14:17 DC  





 


 Sodium Chloride


 90 meq/Potassium


 Chloride 15 meq/


 Potassium


 Phosphate 18 mmol/


 Magnesium Sulfate


 8 meq/Calcium


 Gluconate 15 meq/


 Multivitamins 10


 ml/Chromium/


 Copper/Manganese/


 Seleni/Zn 0.5 ml/


 Insulin Human


 Regular 10 unit/


 Total Parenteral


 Nutrition/Amino


 Acids/Dextrose/


 Fat Emulsion


 Intravenous  1,400 ml @ 


 58.333 mls/


 hr  TPN  CONT  3/27/20 22:00


 3/28/20 21:59 DC 3/27/20 21:43


58.333 MLS/HR


 


 Sodium Chloride


 90 meq/Potassium


 Chloride 15 meq/


 Potassium


 Phosphate 18 mmol/


 Magnesium Sulfate


 8 meq/Calcium


 Gluconate 15 meq/


 Multivitamins 10


 ml/Chromium/


 Copper/Manganese/


 Seleni/Zn 0.5 ml/


 Insulin Human


 Regular 15 unit/


 Total Parenteral


 Nutrition/Amino


 Acids/Dextrose/


 Fat Emulsion


 Intravenous  1,400 ml @ 


 58.333 mls/


 hr  TPN  CONT  3/30/20 22:00


 3/31/20 21:59 DC 3/30/20 21:47


58.333 MLS/HR


 


 Sodium Chloride


 90 meq/Potassium


 Chloride 15 meq/


 Potassium


 Phosphate 18 mmol/


 Magnesium Sulfate


 8 meq/Calcium


 Gluconate 15 meq/


 Multivitamins 10


 ml/Chromium/


 Copper/Manganese/


 Seleni/Zn 0.5 ml/


 Insulin Human


 Regular 20 unit/


 Total Parenteral


 Nutrition/Amino


 Acids/Dextrose/


 Fat Emulsion


 Intravenous  1,400 ml @ 


 58.333 mls/


 hr  TPN  CONT  4/2/20 22:00


 4/3/20 21:59 DC 4/2/20 22:45


58.333 MLS/HR


 


 Sodium Chloride


 90 meq/Potassium


 Chloride 15 meq/


 Potassium


 Phosphate 18 mmol/


 Magnesium Sulfate


 8 meq/Calcium


 Gluconate 15 meq/


 Multivitamins 10


 ml/Chromium/


 Copper/Manganese/


 Seleni/Zn 0.5 ml/


 Total Parenteral


 Nutrition/Amino


 Acids/Dextrose/


 Fat Emulsion


 Intravenous  1,400 ml @ 


 58.333 mls/


 hr  TPN  CONT  3/26/20 22:00


 3/27/20 21:59 DC 3/26/20 22:00


58.333 MLS/HR


 


 Succinylcholine


 Chloride


  (Anectine)  120 mg  1X  ONCE  3/23/20 08:30


 3/23/20 08:31 DC 3/23/20 08:34


120 MG











Labs:


Lab





Laboratory Tests








Test


 4/7/20


18:03 4/7/20


23:40 4/8/20


06:10 4/8/20


06:17


 


Glucose (Fingerstick)


 134 mg/dL


(70-99) 167 mg/dL


(70-99) 


 182 mg/dL


(70-99)


 


Sodium Level


 


 


 135 mmol/L


(136-145) 





 


Potassium Level


 


 


 4.9 mmol/L


(3.5-5.1) 





 


Chloride Level


 


 


 102 mmol/L


() 





 


Carbon Dioxide Level


 


 


 24 mmol/L


(21-32) 





 


Anion Gap   9 (6-14)  


 


Blood Urea Nitrogen


 


 


 61 mg/dL


(7-20) 





 


Creatinine


 


 


 2.0 mg/dL


(0.6-1.0) 





 


Estimated GFR


(Cockcroft-Gault) 


 


 26.5 


 





 


Glucose Level


 


 


 187 mg/dL


(70-99) 





 


Calcium Level


 


 


 8.7 mg/dL


(8.5-10.1) 





 


Phosphorus Level


 


 


 4.8 mg/dL


(2.6-4.7) 





 


Magnesium Level


 


 


 1.9 mg/dL


(1.8-2.4) 





 


Albumin


 


 


 2.3 g/dL


(3.4-5.0) 





 


Test


 4/8/20


08:25 


 


 





 


O2 Saturation 99 % (92-99)    


 


Arterial Blood pH


 7.34


(7.35-7.45) 


 


 





 


Arterial Blood pCO2 at


Patient Temp 36 mmHg


(35-46) 


 


 





 


Arterial Blood pO2 at Patient


Temp 242 mmHg


() 


 


 





 


Arterial Blood HCO3


 19 mmol/L


(21-28) 


 


 





 


Arterial Blood Base Excess


 -6 mmol/L


(-3-3) 


 


 





 


FiO2 40    











Objective:


Assessment:


 


Acute pancreatitis with early necrosis


Cholelithiasis


Leucocytosis improving


JED,Hyperkalemia, Metabolic acidosis on HD


Acute hypoxic resp failure ,bilateral pleural effusion


hypocalcemia 


Prediabetes


HTN





Plan:


Plan of Care


DC  Cefepime  and flagyl


start merrem


cont Daptomycin (3/25),  


f/u cults 


Maintain aspiration precautions 


COVID-19 neg, 3/26  


C diff negative 3/23


 Lt IJ DC'd


 


D/w nursing





Critically ill











IVAN FRANZ MD            Apr 8, 2020 09:57

## 2020-04-08 NOTE — PDOC
PROGRESS NOTES


Chief Complaint


Chief Complaint


Respiratory failure requiring mechanical ventilation


Severe Acute gallstone pancreatitis (not a surgical candidate at this time) with

necrosis


Acute kidney failure now requiring dialysis


Salpingitis


Gallstones (Calculus of gallbladder with acute cholecystitis without 

obstruction)


HTN 


Leukocytosis 


Hypoxia


Uterine fibroid


Hypoxia with respiratory failure


Intractable pain


Intractable nausea


Covid 19 negative. 


Acute on chronic anemia will have to follow up since there is suspicion for 

hemorrhagic fluid in pelvis





Plan:


continue supportive measures


dialysis today


awaiting for placment, she will probably will need to transition to LTAC





History of Present Illness


History of Present Illness


4/7/2020


No acute events reported overnight, case discussed with nursing staff patient in

no acute distress no complaints during my visit, most likely patient will be 

moving to LTAC soon





4/6/2020


Plans for trach int he am and dialysis in the afternoon, no acute events 

reported overnight. 





4/5/2020


No acute events reported overnight, case discussed with nursing staff patient in

no acute distress during my visit





4/4/2020


No acute events reported overnight, patient receiving dialysis today, case 

discussed with nursing staff patient in no acute distress during my visit








4/3/2020


No new changes remains critically ill, off CRRT, still planning for trach on 

Monday unless we manage to wean over the weekend





4/2/2020


Continues to remain critically ill.  The patient unable to be taken off 

ventilatory support as of yet.,  Discussed with pulmonary consultant.  Planning 

all tracheostomy on Monday if he is unable to be weaned off vent





4/1/2020


No acute events reported overnight, no fever or chills, remains critically 

stable, discussed with mother at bedside. 





[id number]


Patient seen and examined in the ICU


She is critically ill


Mechanically ventilated


Assist-control/25/450/30 with 8 of PEEP


She is on cefepime daptomycin Flagyl and micafungin GEN for antibiotic coverage


Also getting TPN and CRRT


Sedated with Versed


Getting IV albumin also


She is tachycardic at 112 bpm


Temp max 100.0


White blood count is down from 45,000 35,000 today


Chart reviewed


Discussed with RN











[id number]


Patient seen and examined in the ICU


She remains very critically ill


Going for a CT of the abdomen chest and pelvis


Ventilated : On assist control 40% FiO2


Discussed with RN


Chart reviewed








[id number]


Patient seen and examined in the ICU


She is still on CRRT although we plan to change to hemodialysis soon


Chart reviewed


Discussed with RN


Hemoglobin down to 6.9 (suspect CRRT related , Will let nephrology consider 

transfusion while on dialysis)








[id number]


Patient seen and examined in the ICU


She is mechanically ventilated


Before meals/25/450/30%


Also on CRRT


Very critically ill


Chart reviewed


Discussed with RN











[id number]


Patient seen and examined in the ICU


She is now been transferred to the Covid 19 unit so we can rule out Covid and 

keep her in isolation


Very critically ill


Intubated and  ventilated


Assist control 40%


Chart reviewed


Discussed with RN


Still on CRRT


Getting a chest x-ray now


Very concerned about her prognosis








[id number]


Patient seen and examined in the ICU


She is extremely critically ill


Remains mechanically ventilated with assist control/25/450/40%


Also on continuous renal replacement therapy


Chart reviewed


Discussed with RN


She has TPN hanging


Also on pressors











[id number]


Patient seen and examined in the ICU


She is still requiring CRRT


On the vent with assist control but her FiO2 is down to 40% from yesterday


Slightly better but still very critically ill


Discussed with RN


Chart reviewed








[id number]


Patient seen and examined in the ICU


She remains extremely critically ill


On IV fentanyl IV Versed IV Doxy


On the vent


Assist-control/25/450/70%


She is critically ill


Discussed with RN


Chart reviewed


Patient is currently on CRRT as well








[id number]


Patient seen and examined in the ICU


She had to be intubated this morning


On assist-control 25/450/100% with 10 of PEEP and only satting 87%


She is extremely critically ill


I'm not sure if she will survive


Chart reviewed


Discussed with RN











Ms Tadeo is a 50yo F w/ PMHx HTN, prediabetes who presents the emergency room

complaints of abdominal pain. Patient described off and on 3 days. She states is

constant, described as a squeezing sensation in a band-like distribution. + 

nausea, vomiting.  She denies any fever or diarrhea.  Patient denies any 

abdominal surgical procedures.  She states is worse with movements, car ride.  

Pain initially was upper abdomen however now pretty much generalized.  Last 

bowel movement was 3/15/2020. Nothing makes her pain better.  Patient denies any

shortness of breath.  She does state the pain moves into her chest.  Denies any 

headache or visual changes.


Lipase 31060, , , Bilirubin 1.4.


CT abdomen confirms pancreatic inflammation, peripancreatic fluid and 

inflammatory changes around the pancreas consistent with pancreatitis. 

Cholelithiasis and 1.4cm uterine fibroid as well as possible left salpingitis. 

Admitted for further care


GI, General surgery, ID, Pulm consulted.





3/17: Overnight per report no urine output. Added dilaudid for pain, PICC placed

per IR. Renal US negative.Seen bedside in ICU, given 2L additional NSS and 

albumin infusion. Still hypotensive, started on levophed. Repeat CT abdomen with

necrosis. Updated her fiancee


3/18: Sats are only 87% on nasal cannula oxygen. Dialysis catheter per 

nephrology


3/19: She is now on BiPAP appears more ill, now on dialysis


3/20: Seen on BiPAP. Her mother and another family member are present and seemed

to be good support for her. Currently on dialysis. Appears critically ill


3/21: Overnight Tmax 101.7 , still on BiPAP FiO2 40%, still on low dose Levophed

gtt, TPN initiated. On dialysis





Overnight still febrile. Dialysis today. She wakes up and responds to pain. No 

CP.





Vitals


Vitals





Vital Signs








  Date Time  Temp Pulse Resp B/P (MAP) Pulse Ox O2 Delivery O2 Flow Rate FiO2


 


4/8/20 16:00      Mechanical Ventilator  


 


4/8/20 15:53     97   


 


4/8/20 15:45  96  98/56 (70)    


 


4/8/20 15:30   18     


 


4/8/20 13:00 100.8       





 100.8       


 


4/7/20 13:28       6.0 











Physical Exam


Physical Exam


GENERAL: Orally intubated/sedated - generalized anasarca 


HEENT: Pupils equal, ETT, dobhoff +


NECK:  Supple 


LUNGS: Diminished aeration bases 


HEART:  S1, S2, regular 


ABDOMEN:  Distended, hypoactive BS, Rectal tube in place 


: [place]   


EXTREMITIES: Generalized edema, no cyanosis - some mottling, Ro[place] boots & SCDs 

bilaterally 


DERMATOLOGIC:  Warm and dry.  No generalized rash.  


CENTRAL NERVOUS SYSTEM: Sedated 


RIJ Temp HDC, RUE-PICC  


Lt IJ removed 4/7


Chestwall line present


General:  No acute distress


Heart:  Other (increased rate)


Lungs:  Other (dimished in BLL)


Abdomen:  Soft, No tenderness


Extremities:  No edema, Other (SOME CLUBBING )


Skin:  Other (mottling noted to extremities )





Labs


LABS





Laboratory Tests








Test


 4/7/20


18:03 4/7/20


23:40 4/8/20


06:10 4/8/20


06:17


 


Glucose (Fingerstick)


 134 mg/dL


(70-99) 167 mg/dL


(70-99) 


 182 mg/dL


(70-99)


 


Sodium Level


 


 


 135 mmol/L


(136-145) 





 


Potassium Level


 


 


 4.9 mmol/L


(3.5-5.1) 





 


Chloride Level


 


 


 102 mmol/L


() 





 


Carbon Dioxide Level


 


 


 24 mmol/L


(21-32) 





 


Anion Gap   9 (6-14)  


 


Blood Urea Nitrogen


 


 


 61 mg/dL


(7-20) 





 


Creatinine


 


 


 2.0 mg/dL


(0.6-1.0) 





 


Estimated GFR


(Cockcroft-Gault) 


 


 26.5 


 





 


Glucose Level


 


 


 187 mg/dL


(70-99) 





 


Calcium Level


 


 


 8.7 mg/dL


(8.5-10.1) 





 


Phosphorus Level


 


 


 4.8 mg/dL


(2.6-4.7) 





 


Magnesium Level


 


 


 1.9 mg/dL


(1.8-2.4) 





 


Albumin


 


 


 2.3 g/dL


(3.4-5.0) 





 


Test


 4/8/20


08:25 4/8/20


13:22 


 





 


O2 Saturation 99 % (92-99)    


 


Arterial Blood pH


 7.34


(7.35-7.45) 


 


 





 


Arterial Blood pCO2 at


Patient Temp 36 mmHg


(35-46) 


 


 





 


Arterial Blood pO2 at Patient


Temp 242 mmHg


() 


 


 





 


Arterial Blood HCO3


 19 mmol/L


(21-28) 


 


 





 


Arterial Blood Base Excess


 -6 mmol/L


(-3-3) 


 


 





 


FiO2 40    


 


Glucose (Fingerstick)


 


 153 mg/dL


(70-99) 


 














Assessment and Plan


Assessmemt and Plan


Problems


Medical Problems:


(1) Acute pancreatitis


Status: Acute  





(2) Cholelithiasis


Status: Acute  











Comment


Review of Relevant


I have reviewed the following items josy (where applicable) has been applied.


Labs





Laboratory Tests








Test


 4/6/20


19:07 4/7/20


00:27 4/7/20


06:45 4/7/20


06:49


 


Glucose (Fingerstick)


 182 mg/dL


(70-99) 170 mg/dL


(70-99) 


 145 mg/dL


(70-99)


 


White Blood Count


 


 


 11.0 x10^3/uL


(4.0-11.0) 





 


Red Blood Count


 


 


 2.50 x10^6/uL


(3.50-5.40) 





 


Hemoglobin


 


 


 8.0 g/dL


(12.0-15.5) 





 


Hematocrit


 


 


 24.9 %


(36.0-47.0) 





 


Mean Corpuscular Volume   99 fL ()  


 


Mean Corpuscular Hemoglobin   32 pg (25-35)  


 


Mean Corpuscular Hemoglobin


Concent 


 


 32 g/dL


(31-37) 





 


Red Cell Distribution Width


 


 


 18.9 %


(11.5-14.5) 





 


Platelet Count


 


 


 270 x10^3/uL


(140-400) 





 


Sodium Level


 


 


 139 mmol/L


(136-145) 





 


Potassium Level


 


 


 4.6 mmol/L


(3.5-5.1) 





 


Chloride Level


 


 


 104 mmol/L


() 





 


Carbon Dioxide Level


 


 


 25 mmol/L


(21-32) 





 


Anion Gap   10 (6-14)  


 


Blood Urea Nitrogen


 


 


 73 mg/dL


(7-20) 





 


Creatinine


 


 


 2.3 mg/dL


(0.6-1.0) 





 


Estimated GFR


(Cockcroft-Gault) 


 


 22.5 


 





 


Glucose Level


 


 


 155 mg/dL


(70-99) 





 


Calcium Level


 


 


 8.9 mg/dL


(8.5-10.1) 





 


Phosphorus Level


 


 


 5.8 mg/dL


(2.6-4.7) 





 


Albumin


 


 


 2.9 g/dL


(3.4-5.0) 





 


Test


 4/7/20


08:00 4/7/20


18:03 4/7/20


23:40 4/8/20


06:10


 


O2 Saturation 96 % (92-99)    


 


Arterial Blood pH


 7.30


(7.35-7.45) 


 


 





 


Arterial Blood pCO2 at


Patient Temp 45 mmHg


(35-46) 


 


 





 


Arterial Blood pO2 at Patient


Temp 95 mmHg


() 


 


 





 


Arterial Blood HCO3


 22 mmol/L


(21-28) 


 


 





 


Arterial Blood Base Excess


 -5 mmol/L


(-3-3) 


 


 





 


FiO2 40    


 


Glucose (Fingerstick)


 


 134 mg/dL


(70-99) 167 mg/dL


(70-99) 





 


Sodium Level


 


 


 


 135 mmol/L


(136-145)


 


Potassium Level


 


 


 


 4.9 mmol/L


(3.5-5.1)


 


Chloride Level


 


 


 


 102 mmol/L


()


 


Carbon Dioxide Level


 


 


 


 24 mmol/L


(21-32)


 


Anion Gap    9 (6-14) 


 


Blood Urea Nitrogen


 


 


 


 61 mg/dL


(7-20)


 


Creatinine


 


 


 


 2.0 mg/dL


(0.6-1.0)


 


Estimated GFR


(Cockcroft-Gault) 


 


 


 26.5 





 


Glucose Level


 


 


 


 187 mg/dL


(70-99)


 


Calcium Level


 


 


 


 8.7 mg/dL


(8.5-10.1)


 


Phosphorus Level


 


 


 


 4.8 mg/dL


(2.6-4.7)


 


Magnesium Level


 


 


 


 1.9 mg/dL


(1.8-2.4)


 


Albumin


 


 


 


 2.3 g/dL


(3.4-5.0)


 


Test


 4/8/20


06:17 4/8/20


08:25 4/8/20


13:22 





 


Glucose (Fingerstick)


 182 mg/dL


(70-99) 


 153 mg/dL


(70-99) 





 


O2 Saturation  99 % (92-99)   


 


Arterial Blood pH


 


 7.34


(7.35-7.45) 


 





 


Arterial Blood pCO2 at


Patient Temp 


 36 mmHg


(35-46) 


 





 


Arterial Blood pO2 at Patient


Temp 


 242 mmHg


() 


 





 


Arterial Blood HCO3


 


 19 mmol/L


(21-28) 


 





 


Arterial Blood Base Excess


 


 -6 mmol/L


(-3-3) 


 





 


FiO2  40   








Laboratory Tests








Test


 4/7/20


18:03 4/7/20


23:40 4/8/20


06:10 4/8/20


06:17


 


Glucose (Fingerstick)


 134 mg/dL


(70-99) 167 mg/dL


(70-99) 


 182 mg/dL


(70-99)


 


Sodium Level


 


 


 135 mmol/L


(136-145) 





 


Potassium Level


 


 


 4.9 mmol/L


(3.5-5.1) 





 


Chloride Level


 


 


 102 mmol/L


() 





 


Carbon Dioxide Level


 


 


 24 mmol/L


(21-32) 





 


Anion Gap   9 (6-14)  


 


Blood Urea Nitrogen


 


 


 61 mg/dL


(7-20) 





 


Creatinine


 


 


 2.0 mg/dL


(0.6-1.0) 





 


Estimated GFR


(Cockcroft-Gault) 


 


 26.5 


 





 


Glucose Level


 


 


 187 mg/dL


(70-99) 





 


Calcium Level


 


 


 8.7 mg/dL


(8.5-10.1) 





 


Phosphorus Level


 


 


 4.8 mg/dL


(2.6-4.7) 





 


Magnesium Level


 


 


 1.9 mg/dL


(1.8-2.4) 





 


Albumin


 


 


 2.3 g/dL


(3.4-5.0) 





 


Test


 4/8/20


08:25 4/8/20


13:22 


 





 


O2 Saturation 99 % (92-99)    


 


Arterial Blood pH


 7.34


(7.35-7.45) 


 


 





 


Arterial Blood pCO2 at


Patient Temp 36 mmHg


(35-46) 


 


 





 


Arterial Blood pO2 at Patient


Temp 242 mmHg


() 


 


 





 


Arterial Blood HCO3


 19 mmol/L


(21-28) 


 


 





 


Arterial Blood Base Excess


 -6 mmol/L


(-3-3) 


 


 





 


FiO2 40    


 


Glucose (Fingerstick)


 


 153 mg/dL


(70-99) 


 











Microbiology


4/5/20 Blood Culture - Preliminary, Resulted


         NO GROWTH AFTER 3 DAYS


4/4/20 Urine Culture - Final, Complete


         


4/4/20 Urine Culture Result 1 ([facility]) - Final, Complete


Medications





Current Medications


Sodium Chloride 1,000 ml @  1,000 mls/hr Q1H IV  Last administered on 3/16/20at 

03:00;  Start 3/16/20 at 03:00;  Stop 3/16/20 at 03:59;  Status DC


Ondansetron HCl (Zofran) 4 mg 1X  ONCE IVP  Last administered on 3/16/20at 

03:27;  Start 3/16/20 at 03:00;  Stop 3/16/20 at 03:01;  Status DC


Morphine Sulfate (Morphine Sulfate) 4 mg 1X  ONCE IV ;  Start 3/16/20 at 03:00; 

Stop 3/16/20 at 03:01;  Status Cancel


Ketorolac Tromethamine (Toradol 30mg Vial) 30 mg 1X  ONCE IV  Last administered 

on 3/16/20at 02:54;  Start 3/16/20 at 03:00;  Stop 3/16/20 at 03:01;  Status DC


Fentanyl Citrate (Fentanyl 2ml Vial) 25 mcg 1X  ONCE IVP  Last administered on 

3/16/20at 03:23;  Start 3/16/20 at 03:30;  Stop 3/16/20 at 03:31;  Status DC


Fentanyl Citrate (Fentanyl 2ml Vial) 100 mcg STK-MED ONCE .ROUTE ;  Start 

3/16/20 at 03:18;  Stop 3/16/20 at 03:18;  Status DC


Iohexol (Omnipaque 350 Mg/ml) 90 ml 1X  ONCE IV  Last administered on 3/16/20at 

03:25;  Start 3/16/20 at 03:30;  Stop 3/16/20 at 03:31;  Status DC


Info (CONTRAST GIVEN -- Rx MONITORING) 1 each PRN DAILY  PRN MC SEE COMMENTS;  

Start 3/16/20 at 03:30;  Stop 3/18/20 at 03:29;  Status DC


Hydromorphone HCl (Dilaudid) 0.5 mg 1X  ONCE IV  Last administered on 3/16/20at 

03:55;  Start 3/16/20 at 04:30;  Stop 3/16/20 at 04:32;  Status DC


Ondansetron HCl (Zofran) 4 mg PRN Q8HRS  PRN IV NAUSEA/VOMITING 1ST CHOICE;  

Start 3/16/20 at 05:00;  Stop 3/16/20 at 09:27;  Status DC


Morphine Sulfate (Morphine Sulfate) 2 mg PRN Q2HR  PRN IV SEVERE PAIN 7-10 Last 

administered on 3/17/20at 12:26;  Start 3/16/20 at 05:00;  Stop 3/17/20 at 

14:15;  Status DC


Sodium Chloride 1,000 ml @  125 mls/hr Q8H IV  Last administered on 3/16/20at 

20:56;  Start 3/16/20 at 05:00;  Stop 3/17/20 at 04:59;  Status DC


Hydromorphone HCl (Dilaudid) 0.5 mg PRN Q3HRS  PRN IV SEVERE PAIN 7-10 Last 

administered on 3/17/20at 10:06;  Start 3/16/20 at 05:00;  Stop 3/17/20 at 

12:01;  Status DC


Piperacillin Sod/ Tazobactam Sod 4.5 gm/Sodium Chloride 100 ml @  200 mls/hr 1X 

ONCE IV  Last administered on 3/16/20at 05:44;  Start 3/16/20 at 06:00;  Stop 

3/16/20 at 06:29;  Status DC


Ondansetron HCl (Zofran) 4 mg PRN Q4HRS  PRN IV NAUSEA/VOMITING 1ST CHOICE Last 

administered on 3/21/20at 16:15;  Start 3/16/20 at 09:30


Insulin Human Lispro (HumaLOG) 0-9 UNITS Q6HRS SQ  Last administered on 4/8/20at

13:25;  Start 3/16/20 at 09:30


Dextrose (Dextrose 50%-Water Syringe) 12.5 gm PRN Q15MIN  PRN IV SEE COMMENTS;  

Start 3/16/20 at 09:30


Pantoprazole Sodium (PROTONIX VIAL for IV PUSH) 40 mg DAILYAC IVP  Last 

administered on 4/8/20at 08:30;  Start 3/16/20 at 11:30


Prochlorperazine Edisylate (Compazine) 10 mg PRN Q6HRS  PRN IV NAUSEA/VOMITING, 

2nd CHOICE Last administered on 3/17/20at 00:42;  Start 3/16/20 at 17:45


Atenolol (Tenormin) 100 mg DAILY PO ;  Start 3/17/20 at 09:00;  Stop 3/16/20 at 

20:08;  Status DC


Metoprolol Tartrate (Lopressor Vial) 2.5 mg Q6HRS IVP  Last administered on 

3/17/20at 05:51;  Start 3/16/20 at 20:15;  Stop 3/17/20 at 10:02;  Status DC


Metoprolol Tartrate (Lopressor Vial) 5 mg Q6HRS IVP  Last administered on 

3/26/20at 00:12;  Start 3/17/20 at 10:15;  Stop 3/28/20 at 08:48;  Status DC


Hydromorphone HCl (Dilaudid) 1 mg PRN Q3HRS  PRN IV SEVERE PAIN 7-10 Last 

administered on 3/23/20at 05:13;  Start 3/17/20 at 12:00;  Stop 3/31/20 at 

00:25;  Status DC


Lidocaine HCl (Buffered Lidocaine 1%) 3 ml STK-MED ONCE .ROUTE ;  Start 3/17/20 

at 12:55;  Stop 3/17/20 at 12:56;  Status DC


Albumin Human 500 ml @  125 mls/hr 1X  ONCE IV  Last administered on 3/17/20at 

14:33;  Start 3/17/20 at 14:30;  Stop 3/17/20 at 18:32;  Status DC


Norepinephrine Bitartrate 8 mg/ Dextrose 258 ml @  17.299 mls/ hr CONT  PRN IV 

PER PROTOCOL Last administered on 4/8/20at 13:45;  Start 3/17/20 at 15:30


Sodium Chloride 1,000 ml @  125 mls/hr Q8H IV  Last administered on 3/17/20at 

21:04;  Start 3/17/20 at 16:00;  Stop 3/18/20 at 02:42;  Status DC


Albumin Human 500 ml @  125 mls/hr PRN BID  PRN IV After every 2L NSS & BP < 

90mm Last administered on 4/1/20at 14:21;  Start 3/17/20 at 16:00


Iohexol (Omnipaque 300 Mg/ml) 60 ml 1X  ONCE IV  Last administered on 3/17/20at 

17:20;  Start 3/17/20 at 17:00;  Stop 3/17/20 at 17:01;  Status DC


Info (CONTRAST GIVEN -- Rx MONITORING) 1 each PRN DAILY  PRN MC SEE COMMENTS;  

Start 3/17/20 at 17:00;  Stop 3/19/20 at 16:59;  Status DC


Meropenem 1 gm/ Sodium Chloride 100 ml @  200 mls/hr Q8HRS IV  Last administered

on 3/18/20at 05:45;  Start 3/17/20 at 20:00;  Stop 3/18/20 at 08:48;  Status DC


Furosemide (Lasix) 40 mg 1X  ONCE IVP  Last administered on 3/17/20at 22:12;  

Start 3/17/20 at 22:30;  Stop 3/17/20 at 22:31;  Status DC


Calcium Chloride 1000 mg/Sodium Chloride 110 ml @  220 mls/hr 1X  ONCE IV  Last 

administered on 3/17/20at 22:11;  Start 3/17/20 at 22:30;  Stop 3/17/20 at 

22:59;  Status DC


Albuterol Sulfate (Ventolin Neb Soln) 2.5 mg 1X  ONCE NEB  Last administered on 

3/18/20at 00:56;  Start 3/17/20 at 22:30;  Stop 3/17/20 at 22:31;  Status DC


Insulin Human Regular (HumuLIN R VIAL) 5 unit 1X  ONCE IV  Last administered on 

3/17/20at 22:14;  Start 3/17/20 at 22:30;  Stop 3/17/20 at 22:31;  Status DC


Magnesium Sulfate 50 ml @ 25 mls/hr 1X  ONCE IV  Last administered on 3/18/20at 

02:57;  Start 3/18/20 at 03:00;  Stop 3/18/20 at 04:59;  Status DC


Calcium Gluconate 1000 mg/Sodium Chloride 110 ml @  220 mls/hr 1X  ONCE IV  Last

administered on 3/18/20at 02:46;  Start 3/18/20 at 03:00;  Stop 3/18/20 at 

03:29;  Status DC


Sodium Chloride 1,000 ml @  200 mls/hr Q5H IV  Last administered on 3/18/20at 

02:46;  Start 3/18/20 at 03:00;  Stop 3/18/20 at 10:21;  Status DC


Calcium Gluconate 1000 mg/Sodium Chloride 110 ml @  220 mls/hr 1X  ONCE IV  Last

administered on 3/18/20at 03:21;  Start 3/18/20 at 03:30;  Stop 3/18/20 at 

03:59;  Status DC


Sodium Bicarbonate 50 meq/Sodium Chloride 1,050 ml @  75 mls/hr Q14H IV  Last 

administered on 3/22/20at 21:10;  Start 3/18/20 at 07:30;  Stop 3/23/20 at 

10:28;  Status DC


Calcium Gluconate 2000 mg/Sodium Chloride 120 ml @  220 mls/hr 1X  ONCE IV  Last

administered on 3/18/20at 09:05;  Start 3/18/20 at 07:30;  Stop 3/18/20 at 

08:02;  Status DC


Lidocaine HCl (Xylocaine-Mpf 1% 2ml Vial) 2 ml STK-MED ONCE .ROUTE ;  Start 

3/18/20 at 08:47;  Stop 3/18/20 at 08:47;  Status DC


Meropenem 500 mg/ Sodium Chloride 50 ml @  100 mls/hr Q12HR IV  Last 

administered on 3/23/20at 21:01;  Start 3/18/20 at 18:00;  Stop 3/24/20 at 

07:58;  Status DC


Lidocaine HCl (Buffered Lidocaine 1%) 3 ml STK-MED ONCE .ROUTE ;  Start 3/18/20 

at 09:46;  Stop 3/18/20 at 09:46;  Status DC


Lidocaine HCl (Buffered Lidocaine 1%) 6 ml 1X  ONCE INJ  Last administered on 

3/18/20at 10:26;  Start 3/18/20 at 10:15;  Stop 3/18/20 at 10:16;  Status DC


Info (Tpn Per Pharmacy) 1 each PRN DAILY  PRN MC SEE COMMENTS Last administered 

on 4/8/20at 13:23;  Start 3/18/20 at 12:00


Sodium Chloride 1,000 ml @  1,000 mls/hr Q1H PRN IV hypotension;  Start 3/18/20 

at 12:07;  Stop 3/18/20 at 18:06;  Status DC


Diphenhydramine HCl (Benadryl) 25 mg 1X PRN  PRN IV ITCHING;  Start 3/18/20 at 1

2:15;  Stop 3/19/20 at 12:14;  Status DC


Diphenhydramine HCl (Benadryl) 25 mg 1X PRN  PRN IV ITCHING;  Start 3/18/20 at 

12:15;  Stop 3/19/20 at 12:14;  Status DC


Sodium Chloride 1,000 ml @  400 mls/hr Q2H30M PRN IV PATENCY;  Start 3/18/20 at 

12:07;  Stop 3/19/20 at 00:06;  Status DC


Info (PHARMACY MONITORING -- do not chart) 1 each PRN DAILY  PRN MC SEE 

COMMENTS;  Start 3/18/20 at 12:15;  Stop 3/20/20 at 08:13;  Status DC


Sodium Chloride 90 meq/Calcium Gluconate 10 meq/ Multivitamins 10 ml/Chromium/ 

Copper/Manganese/ Seleni/Zn 1 ml/ Total Parenteral Nutrition/Amino 

Acids/Dextrose/ Fat Emulsion Intravenous 55.005 ml  @ 2.292 mls/hr TPN  CONT IV 

;  Start 3/18/20 at 22:00;  Stop 3/18/20 at 12:33;  Status DC


Info (Tpn Per Pharmacy) 1 each PRN DAILY  PRN MC SEE COMMENTS;  Start 3/18/20 at

12:30;  Status UNV


Sodium Chloride 90 meq/Calcium Gluconate 10 meq/ Multivitamins 10 ml/Chromium/ 

Copper/Manganese/ Seleni/Zn 0.5 ml/ Total Parenteral Nutrition/Amino 

Acids/Dextrose/ Fat Emulsion Intravenous 1,512 ml @  63 mls/hr TPN  CONT IV  

Last administered on 3/18/20at 22:06;  Start 3/18/20 at 22:00;  Stop 3/19/20 at 

21:59;  Status DC


Calcium Carbonate/ Glycine (Tums) 500 mg PRN AFTMEALHC  PRN PO INDIGESTION;  

Start 3/18/20 at 17:45


Calcium Gluconate (Calcium Gluconate) 2,000 mg 1X  ONCE IVP  Last administered 

on 3/19/20at 02:19;  Start 3/19/20 at 02:15;  Stop 3/19/20 at 02:16;  Status DC


Calcium Chloride 3000 mg/Sodium Chloride 1,030 ml @  50 mls/hr [id number] IV  Last 

administered on 3/21/20at 02:17;  Start 3/19/20 at 08:00;  Stop 3/21/20 at 

15:23;  Status DC


Lorazepam (Ativan Inj) 1 mg PRN Q4HRS  PRN IVP ANXIETY / AGITATION Last 

administered on 3/23/20at 00:34;  Start 3/19/20 at 09:00


Sodium Chloride 1,000 ml @  1,000 mls/hr Q1H PRN IV hypotension;  Start 3/19/20 

at 08:56;  Stop 3/19/20 at 14:55;  Status DC


Albumin Human 200 ml @  200 mls/hr 1X PRN  PRN IV Hypotension;  Start 3/19/20 at

09:00;  Stop 3/19/20 at 14:59;  Status DC


Diphenhydramine HCl (Benadryl) 25 mg 1X PRN  PRN IV ITCHING;  Start 3/19/20 at 

09:00;  Stop 3/20/20 at 08:59;  Status DC


Diphenhydramine HCl (Benadryl) 25 mg 1X PRN  PRN IV ITCHING;  Start 3/19/20 at 

09:00;  Stop 3/20/20 at 08:59;  Status DC


Sodium Chloride 1,000 ml @  400 mls/hr Q2H30M PRN IV PATENCY;  Start 3/19/20 at 

08:56;  Stop 3/19/20 at 20:55;  Status DC


Info (PHARMACY MONITORING -- do not chart) 1 each PRN DAILY  PRN MC SEE 

COMMENTS;  Start 3/19/20 at 09:00;  Status UNV


Info (PHARMACY MONITORING -- do not chart) 1 each PRN DAILY  PRN MC SEE 

COMMENTS;  Start 3/19/20 at 09:00;  Stop 3/20/20 at 08:13;  Status DC


Digoxin (Lanoxin) 500 mcg 1X  ONCE IV  Last administered on 3/19/20at 10:04;  

Start 3/19/20 at 10:00;  Stop 3/19/20 at 10:01;  Status DC


Digoxin (Lanoxin) 125 mcg 1X  ONCE IV  Last administered on 3/19/20at 17:10;  

Start 3/19/20 at 18:00;  Stop 3/19/20 at 18:01;  Status DC


Magnesium Sulfate 100 ml @  25 mls/hr 1X  ONCE IV  Last administered on 

3/19/20at 12:48;  Start 3/19/20 at 13:00;  Stop 3/19/20 at 16:59;  Status DC


Sodium Chloride 90 meq/Magnesium Sulfate 10 meq/ Calcium Gluconate 20 meq/ Mul

tivitamins 10 ml/Chromium/ Copper/Manganese/ Seleni/Zn 0.5 ml/ Total Parenteral 

Nutrition/Amino Acids/Dextrose/ Fat Emulsion Intravenous 1,512 ml @  63 mls/hr 

TPN  CONT IV  Last administered on 3/19/20at 22:25;  Start 3/19/20 at 22:00;  

Stop 3/20/20 at 21:59;  Status DC


Sodium Chloride 1,000 ml @  1,000 mls/hr Q1H PRN IV hypotension;  Start 3/20/20 

at 08:05;  Stop 3/20/20 at 14:04;  Status DC


Albumin Human 200 ml @  200 mls/hr 1X  ONCE IV  Last administered on 3/20/20at 

08:57;  Start 3/20/20 at 08:15;  Stop 3/20/20 at 09:14;  Status DC


Diphenhydramine HCl (Benadryl) 25 mg 1X PRN  PRN IV ITCHING;  Start 3/20/20 at 

08:15;  Stop 3/21/20 at 08:14;  Status DC


Diphenhydramine HCl (Benadryl) 25 mg 1X PRN  PRN IV ITCHING;  Start 3/20/20 at 

08:15;  Stop 3/21/20 at 08:14;  Status DC


Sodium Chloride 1,000 ml @  400 mls/hr Q2H30M PRN IV PATENCY;  Start 3/20/20 at 

08:05;  Stop 3/20/20 at 20:04;  Status DC


Info (PHARMACY MONITORING -- do not chart) 1 each PRN DAILY  PRN MC SEE 

COMMENTS;  Start 3/20/20 at 08:15;  Stop 3/24/20 at 07:57;  Status DC


Sodium Chloride 90 meq/Potassium Chloride 15 meq/ Potassium Phosphate 10 mmol/ 

Magnesium Sulfate 10 meq/Calcium Gluconate 20 meq/ Multivitamins 10 ml/Chromium/

Copper/Manganese/ Seleni/Zn 0.5 ml/ Total Parenteral Nutrition/Amino Aci

ds/Dextrose/ Fat Emulsion Intravenous 1,512 ml @  63 mls/hr TPN  CONT IV  Last 

administered on 3/20/20at 21:01;  Start 3/20/20 at 22:00;  Stop 3/21/20 at 

21:59;  Status DC


Potassium Chloride/Water 100 ml @  100 mls/hr 1X  ONCE IV  Last administered on 

3/20/20at 14:09;  Start 3/20/20 at 14:00;  Stop 3/20/20 at 14:59;  Status DC


Benzocaine (Hurricaine One) 1 spray 1X  ONCE MM  Last administered on 3/20/20at 

16:38;  Start 3/20/20 at 14:30;  Stop 3/20/20 at 14:31;  Status DC


Lidocaine HCl (Glydo (Lidocaine) Jelly) 1 thomas 1X  ONCE MM  Last administered on 

3/20/20at 16:38;  Start 3/20/20 at 14:30;  Stop 3/20/20 at 14:31;  Status DC


Linezolid/Dextrose 300 ml @  300 mls/hr Q12HR IV  Last administered on 3/26/20at

21:04;  Start 3/20/20 at 20:00;  Stop 3/27/20 at 07:50;  Status DC


Acetaminophen (Tylenol) 650 mg PRN Q6HRS  PRN PO MILD PAIN / TEMP;  Start 

3/21/20 at 03:30;  Stop 3/21/20 at 03:36;  Status DC


Acetaminophen (Tylenol) 650 mg PRN Q6HRS  PRN PEG MILD PAIN / TEMP Last 

administered on 3/26/20at 07:35;  Start 3/21/20 at 03:36


Sodium Chloride 1,000 ml @  1,000 mls/hr Q1H PRN IV hypotension;  Start 3/21/20 

at 07:50;  Stop 3/21/20 at 13:49;  Status DC


Albumin Human 200 ml @  200 mls/hr 1X PRN  PRN IV Hypotension;  Start 3/21/20 at

08:00;  Stop 3/21/20 at 13:59;  Status DC


Sodium Chloride (Normal Saline Flush) 10 ml 1X PRN  PRN IV AP catheter pack;  

Start 3/21/20 at 08:00;  Stop 3/22/20 at 07:59;  Status DC


Sodium Chloride (Normal Saline Flush) 10 ml 1X PRN  PRN IV  catheter pack;  

Start 3/21/20 at 08:00;  Stop 3/22/20 at 07:59;  Status DC


Sodium Chloride 1,000 ml @  400 mls/hr Q2H30M PRN IV PATENCY;  Start 3/21/20 at 

07:50;  Stop 3/21/20 at 19:49;  Status DC


Info (PHARMACY MONITORING -- do not chart) 1 each PRN DAILY  PRN MC SEE 

COMMENTS;  Start 3/21/20 at 08:00;  Status UNV


Info (PHARMACY MONITORING -- do not chart) 1 each PRN DAILY  PRN MC SEE 

COMMENTS;  Start 3/21/20 at 08:00;  Stop 3/23/20 at 08:25;  Status DC


Sodium Chloride 90 meq/Potassium Chloride 15 meq/ Potassium Phosphate 10 mmol/ 

Magnesium Sulfate 10 meq/Calcium Gluconate 20 meq/ Multivitamins 10 ml/Chromium/

Copper/Manganese/ Seleni/Zn 0.5 ml/ Total Parenteral Nutrition/Amino Acids

/Dextrose/ Fat Emulsion Intravenous 1,512 ml @  63 mls/hr TPN  CONT IV  Last 

administered on 3/21/20at 20:57;  Start 3/21/20 at 22:00;  Stop 3/22/20 at 

21:59;  Status DC


Sodium Chloride 90 meq/Potassium Chloride 15 meq/ Potassium Phosphate 15 mmol/ 

Magnesium Sulfate 10 meq/Calcium Gluconate 20 meq/ Multivitamins 10 ml/Chromium/

Copper/Manganese/ Seleni/Zn 0.5 ml/ Total Parenteral Nutrition/Amino 

Acids/Dextrose/ Fat Emulsion Intravenous 1,512 ml @  63 mls/hr TPN  CONT IV ;  

Start 3/22/20 at 22:00;  Stop 3/22/20 at 14:16;  Status DC


Sodium Chloride 90 meq/Potassium Chloride 15 meq/ Potassium Phosphate 15 mmol/ 

Magnesium Sulfate 10 meq/Calcium Gluconate 20 meq/ Multivitamins 10 ml/Chromium/

Copper/Manganese/ Seleni/Zn 0.5 ml/ Total Parenteral Nutrition/Amino Ac

ids/Dextrose/ Fat Emulsion Intravenous 1,200 ml @  50 mls/hr TPN  CONT IV ;  

Start 3/22/20 at 22:00;  Stop 3/22/20 at 14:17;  Status DC


Sodium Chloride 90 meq/Potassium Chloride 15 meq/ Potassium Phosphate 10 mmol/ 

Magnesium Sulfate 10 meq/Calcium Gluconate 20 meq/ Multivitamins 10 ml/Chromium/

Copper/Manganese/ Seleni/Zn 0.5 ml/ Total Parenteral Nutrition/Amino 

Acids/Dextrose/ Fat Emulsion Intravenous 1,200 ml @  50 mls/hr TPN  CONT IV  

Last administered on 3/22/20at 23:29;  Start 3/22/20 at 22:00;  Stop 3/23/20 at 

21:59;  Status DC


Sodium Chloride 1,000 ml @  1,000 mls/hr Q1H PRN IV hypotension;  Start 3/23/20 

at 07:28;  Stop 3/23/20 at 13:27;  Status DC


Albumin Human 200 ml @  200 mls/hr 1X  ONCE IV  Last administered on 3/23/20at 

08:51;  Start 3/23/20 at 07:30;  Stop 3/23/20 at 08:29;  Status DC


Diphenhydramine HCl (Benadryl) 25 mg 1X PRN  PRN IV ITCHING;  Start 3/23/20 at 

07:30;  Stop 3/24/20 at 07:29;  Status DC


Diphenhydramine HCl (Benadryl) 25 mg 1X PRN  PRN IV ITCHING;  Start 3/23/20 at 

07:30;  Stop 3/24/20 at 07:29;  Status DC


Sodium Chloride 1,000 ml @  400 mls/hr Q2H30M PRN IV PATENCY;  Start 3/23/20 at 

07:28;  Stop 3/23/20 at 19:27;  Status DC


Info (PHARMACY MONITORING -- do not chart) 1 each PRN DAILY  PRN MC SEE 

COMMENTS;  Start 3/23/20 at 07:30;  Stop 4/3/20 at 13:01;  Status DC


Metronidazole 100 ml @  100 mls/hr Q6HRS IV  Last administered on 4/8/20at 

06:26;  Start 3/23/20 at 08:30;  Stop 4/8/20 at 09:58;  Status DC


Micafungin Sodium 100 mg/Dextrose 100 ml @  100 mls/hr Q24H IV  Last administer

ed on 4/8/20at 14:28;  Start 3/23/20 at 09:00


Propofol 0 ml @ As Directed STK-MED ONCE IV ;  Start 3/23/20 at 07:53;  Stop 

3/23/20 at 07:53;  Status DC


Etomidate (Amidate) 20 mg STK-MED ONCE IV ;  Start 3/23/20 at 07:53;  Stop 

3/23/20 at 07:54;  Status DC


Midazolam HCl (Versed) 5 mg STK-MED ONCE .ROUTE ;  Start 3/23/20 at 07:57;  Stop

3/23/20 at 07:57;  Status DC


Fentanyl Citrate 30 ml @ 0 mls/hr CONT  PRN IV SEE PROTOCOL Last administered on

4/8/20at 13:29;  Start 3/23/20 at 08:15


Artificial Tears (Artificial Tears) 1 drop PRN Q1HR  PRN OU DRY EYE, 1st choice;

 Start 3/23/20 at 08:15


Midazolam HCl 50 mg/Sodium Chloride 50 ml @ 0 mls/hr CONT  PRN IV SEE PROTOCOL 

Last administered on 3/26/20at 22:39;  Start 3/23/20 at 08:15;  Stop 3/28/20 at 

15:59;  Status DC


Etomidate (Amidate) 8 mg 1X  ONCE IV  Last administered on 3/23/20at 08:33;  

Start 3/23/20 at 08:30;  Stop 3/23/20 at 08:31;  Status DC


Succinylcholine Chloride (Anectine) 120 mg 1X  ONCE IV  Last administered on 

3/23/20at 08:34;  Start 3/23/20 at 08:30;  Stop 3/23/20 at 08:31;  Status DC


Midazolam HCl (Versed) 5 mg 1X  ONCE IV ;  Start 3/23/20 at 08:30;  Stop 3/23/20

at 08:31;  Status DC


Potassium Chloride 15 meq/ Bicarbonate Dialysis Soln w/ out KCl 5,007.5 ml  @ 

1,000 mls/ hr Q5H1M IV  Last administered on 3/24/20at 11:11;  Start 3/23/20 at 

12:00;  Stop 3/24/20 at 11:15;  Status DC


Potassium Chloride 15 meq/ Bicarbonate Dialysis Soln w/ out KCl 5,007.5 ml  @ 

1,000 mls/ hr Q5H1M IV  Last administered on 3/24/20at 11:12;  Start 3/23/20 at 

12:00;  Stop 3/24/20 at 11:17;  Status DC


Potassium Chloride 15 meq/ Bicarbonate Dialysis Soln w/ out KCl 5,007.5 ml  @ 

1,000 mls/ hr Q5H1M IV  Last administered on 3/24/20at 11:11;  Start 3/23/20 at 

12:00;  Stop 3/24/20 at 11:19;  Status DC


Sodium Chloride 90 meq/Potassium Chloride 15 meq/ Potassium Phosphate 10 mmol/ 

Magnesium Sulfate 10 meq/Calcium Gluconate 20 meq/ Multivitamins 10 ml/Chromium/

Copper/Manganese/ Seleni/Zn 0.5 ml/ Total Parenteral Nutrition/Amino 

Acids/Dextrose/ Fat Emulsion Intravenous 1,400 ml @  58.333 mls/ hr TPN  CONT IV

 Last administered on 3/23/20at 21:42;  Start 3/23/20 at 22:00;  Stop 3/24/20 at

21:59;  Status DC


Heparin Sodium (Porcine) (Heparin Sodium) 5,000 unit Q8HRS SQ  Last administered

on 3/28/20at 05:55;  Start 3/23/20 at 15:00;  Stop 3/28/20 at 13:28;  Status DC


Meropenem 500 mg/ Sodium Chloride 50 ml @  100 mls/hr Q6HRS IV  Last 

administered on 3/25/20at 06:00;  Start 3/24/20 at 09:00;  Stop 3/25/20 at 

07:29;  Status DC


Potassium Phosphate 20 mmol/ Sodium Chloride 106.6667 ml @  51.667 m... 1X  ONCE

IV  Last administered on 3/24/20at 11:22;  Start 3/24/20 at 10:15;  Stop 3/24/20

at 12:18;  Status DC


Acetaminophen (Tylenol Supp) 650 mg PRN Q6HRS  PRN ID MILD PAIN / TEMP Last 

administered on 3/24/20at 10:37;  Start 3/24/20 at 10:30


Potassium Chloride/Water 100 ml @  100 mls/hr Q1H IV  Last administered on 

3/24/20at 12:12;  Start 3/24/20 at 11:00;  Stop 3/24/20 at 12:59;  Status DC


Potassium Chloride 20 meq/ Bicarbonate Dialysis Soln w/ out KCl 5,010 ml @  

1,000 mls/hr Q5H1M IV  Last administered on 3/25/20at 08:48;  Start 3/24/20 at 

12:00;  Stop 3/25/20 at 13:03;  Status DC


Potassium Chloride 20 meq/ Bicarbonate Dialysis Soln w/ out KCl 5,010 ml @  

1,000 mls/hr Q5H1M IV  Last administered on 3/29/20at 14:52;  Start 3/24/20 at 

11:30;  Stop 3/29/20 at 19:59;  Status DC


Potassium Chloride 20 meq/ Bicarbonate Dialysis Soln w/ out KCl 5,010 ml @  

1,000 mls/hr Q5H1M IV  Last administered on 3/29/20at 14:53;  Start 3/24/20 at 

11:30;  Stop 3/29/20 at 19:59;  Status DC


Sodium Chloride 90 meq/Potassium Chloride 15 meq/ Potassium Phosphate 15 mmol/ 

Magnesium Sulfate 10 meq/Calcium Gluconate 15 meq/ Multivitamins 10 ml/Chromium/

Copper/Manganese/ Seleni/Zn 0.5 ml/ Total Parenteral Nutrition/Amino 

Acids/Dextrose/ Fat Emulsion Intravenous 1,400 ml @  58.333 mls/ hr TPN  CONT IV

 Last administered on 3/24/20at 22:17;  Start 3/24/20 at 22:00;  Stop 3/25/20 at

21:59;  Status DC


Cefepime HCl (Maxipime) 2 gm Q12HR IVP  Last administered on 4/7/20at 20:56;  

Start 3/25/20 at 09:00;  Stop 4/8/20 at 09:58;  Status DC


Daptomycin 500 mg/ Sodium Chloride 50 ml @  100 mls/hr Q48H IV  Last 

administered on 4/8/20at 14:02;  Start 3/25/20 at 08:30


Lidocaine HCl (Buffered Lidocaine 1%) 3 ml 1X  ONCE INJ  Last administered on 

3/25/20at 10:27;  Start 3/25/20 at 10:30;  Stop 3/25/20 at 10:31;  Status DC


Potassium Phosphate 20 mmol/ Sodium Chloride 106.6667 ml @  51.667 m... 1X  ONCE

IV  Last administered on 3/25/20at 12:51;  Start 3/25/20 at 13:00;  Stop 3/25/20

at 15:03;  Status DC


Sodium Chloride 90 meq/Potassium Chloride 15 meq/ Potassium Phosphate 18 mmol/ 

Magnesium Sulfate 8 meq/Calcium Gluconate 15 meq/ Multivitamins 10 ml/Chromium/ 

Copper/Manganese/ Seleni/Zn 0.5 ml/ Total Parenteral Nutrition/Amino 

Acids/Dextrose/ Fat Emulsion Intravenous 1,400 ml @  58.333 mls/ hr TPN  CONT IV

 Last administered on 3/25/20at 22:16;  Start 3/25/20 at 22:00;  Stop 3/26/20 at

21:59;  Status DC


Potassium Chloride 20 meq/ Bicarbonate Dialysis Soln w/ out KCl 5,010 ml @  

1,000 mls/hr Q5H1M IV  Last administered on 3/29/20at 14:54;  Start 3/25/20 at 

16:00;  Stop 3/29/20 at 19:59;  Status DC


Multi-Ingred Cream/Lotion/Oil/ Oint (Artificial Tears Eye Ointment) 1 thomas PRN 

Q1HR  PRN OU DRY EYE, 2nd choice Last administered on 4/3/20at 11:05;  Start 

3/25/20 at 17:30


Sodium Chloride 90 meq/Potassium Chloride 15 meq/ Potassium Phosphate 18 mmol/ 

Magnesium Sulfate 8 meq/Calcium Gluconate 15 meq/ Multivitamins 10 ml/Chromium/ 

Copper/Manganese/ Seleni/Zn 0.5 ml/ Total Parenteral Nutrition/Amino 

Acids/Dextrose/ Fat Emulsion Intravenous 1,400 ml @  58.333 mls/ hr TPN  CONT IV

 Last administered on 3/26/20at 22:00;  Start 3/26/20 at 22:00;  Stop 3/27/20 at

21:59;  Status DC


Albumin Human 500 ml @  125 mls/hr 1X  ONCE IV ;  Start 3/26/20 at 14:15;  Stop 

3/26/20 at 18:14;  Status DC


Sodium Chloride 90 meq/Potassium Chloride 15 meq/ Potassium Phosphate 18 mmol/ 

Magnesium Sulfate 8 meq/Calcium Gluconate 15 meq/ Multivitamins 10 ml/Chromium/ 

Copper/Manganese/ Seleni/Zn 0.5 ml/ Insulin Human Regular 10 unit/ Total 

Parenteral Nutrition/Amino Acids/Dextrose/ Fat Emulsion Intravenous 1,400 ml @  

58.333 mls/ hr TPN  CONT IV  Last administered on 3/27/20at 21:43;  Start 

3/27/20 at 22:00;  Stop 3/28/20 at 21:59;  Status DC


Lidocaine HCl (Buffered Lidocaine 1%) 3 ml STK-MED ONCE .ROUTE ;  Start 3/25/20 

at 10:00;  Stop 3/27/20 at 13:57;  Status DC


Midazolam HCl 100 mg/Sodium Chloride 100 ml @ 7 mls/hr CONT  PRN IV SEE PROTOCOL

Last administered on 4/8/20at 15:35;  Start 3/28/20 at 16:00


Sodium Chloride 90 meq/Potassium Chloride 15 meq/ Potassium Phosphate 18 mmol/ 

Magnesium Sulfate 8 meq/Calcium Gluconate 15 meq/ Multivitamins 10 ml/Chromium/ 

Copper/Manganese/ Seleni/Zn 0.5 ml/ Insulin Human Regular 15 unit/ Total 

Parenteral Nutrition/Amino Acids/Dextrose/ Fat Emulsion Intravenous 1,400 ml @  

58.333 mls/ hr TPN  CONT IV  Last administered on 3/28/20at 20:34;  Start 

3/28/20 at 22:00;  Stop 3/29/20 at 21:59;  Status DC


Info (Icu Electrolyte Protocol) 1 ea CONT PRN  PRN MC PER PROTOCOL;  Start 

3/29/20 at 13:15


Sodium Chloride 90 meq/Potassium Chloride 15 meq/ Potassium Phosphate 18 mmol/ 

Magnesium Sulfate 8 meq/Calcium Gluconate 15 meq/ Multivitamins 10 ml/Chromium/ 

Copper/Manganese/ Seleni/Zn 0.5 ml/ Insulin Human Regular 15 unit/ Total 

Parenteral Nutrition/Amino Acids/Dextrose/ Fat Emulsion Intravenous 1,400 ml @  

58.333 mls/ hr TPN  CONT IV  Last administered on 3/29/20at 22:05;  Start 

3/29/20 at 22:00;  Stop 3/30/20 at 21:59;  Status DC


Potassium Chloride 15 meq/ Bicarbonate Dialysis Soln w/ out KCl 5,007.5 ml  @ 

1,000 mls/ hr Q5H1M IV  Last administered on 4/1/20at 18:14;  Start 3/29/20 at 

20:00;  Stop 4/2/20 at 13:08;  Status DC


Potassium Chloride 15 meq/ Bicarbonate Dialysis Soln w/ out KCl 5,007.5 ml  @ 1,

000 mls/ hr Q5H1M IV  Last administered on 4/1/20at 18:14;  Start 3/29/20 at 

20:00;  Stop 4/2/20 at 13:08;  Status DC


Potassium Chloride 15 meq/ Bicarbonate Dialysis Soln w/ out KCl 5,007.5 ml  @ 

1,000 mls/ hr Q5H1M IV  Last administered on 4/1/20at 18:14;  Start 3/29/20 at 

20:00;  Stop 4/2/20 at 13:08;  Status DC


Iohexol (Omnipaque 240 Mg/ml) 30 ml 1X  ONCE PO  Last administered on 3/30/20at 

11:30;  Start 3/30/20 at 11:30;  Stop 3/30/20 at 11:33;  Status DC


Info (CONTRAST GIVEN -- Rx MONITORING) 1 each PRN DAILY  PRN MC SEE COMMENTS;  

Start 3/30/20 at 11:45;  Stop 4/1/20 at 11:44;  Status DC


Sodium Chloride 90 meq/Potassium Chloride 15 meq/ Potassium Phosphate 18 mmol/ 

Magnesium Sulfate 8 meq/Calcium Gluconate 15 meq/ Multivitamins 10 ml/Chromium/ 

Copper/Manganese/ Seleni/Zn 0.5 ml/ Insulin Human Regular 15 unit/ Total 

Parenteral Nutrition/Amino Acids/Dextrose/ Fat Emulsion Intravenous 1,400 ml @  

58.333 mls/ hr TPN  CONT IV  Last administered on 3/30/20at 21:47;  Start 

3/30/20 at 22:00;  Stop 3/31/20 at 21:59;  Status DC


Sodium Chloride 90 meq/Potassium Chloride 15 meq/ Potassium Phosphate 18 mmol/ 

Magnesium Sulfate 8 meq/Calcium Gluconate 15 meq/ Multivitamins 10 ml/Chromium/ 

Copper/Manganese/ Seleni/Zn 0.5 ml/ Insulin Human Regular 20 unit/ Total 

Parenteral Nutrition/Amino Acids/Dextrose/ Fat Emulsion Intravenous 1,400 ml @  

58.333 mls/ hr TPN  CONT IV  Last administered on 3/31/20at 21:36;  Start 

3/31/20 at 22:00;  Stop 4/1/20 at 21:59;  Status DC


Alteplase, Recombinant (Cathflo For Central Catheter Clearance) 1 mg 1X  ONCE 

INT CAT  Last administered on 3/31/20at 20:03;  Start 3/31/20 at 19:30;  Stop 

3/31/20 at 19:46;  Status DC


Alteplase, Recombinant (Cathflo For Central Catheter Clearance) 1 mg 1X  ONCE 

INT CAT  Last administered on 3/31/20at 22:05;  Start 3/31/20 at 22:00;  Stop 

3/31/20 at 22:01;  Status DC


Sodium Chloride 90 meq/Potassium Chloride 15 meq/ Potassium Phosphate 18 mmol/ 

Magnesium Sulfate 8 meq/Calcium Gluconate 15 meq/ Multivitamins 10 ml/Chromium/ 

Copper/Manganese/ Seleni/Zn 0.5 ml/ Insulin Human Regular 20 unit/ Total 

Parenteral Nutrition/Amino Acids/Dextrose/ Fat Emulsion Intravenous 1,400 ml @  

58.333 mls/ hr TPN  CONT IV  Last administered on 4/1/20at 21:30;  Start 4/1/20 

at 22:00;  Stop 4/2/20 at 21:59;  Status DC


Dexmedetomidine HCl 400 mcg/ Sodium Chloride 100 ml @ 0 mls/hr CONT  PRN IV 

ANXIETY / AGITATION Last administered on 4/8/20at 15:36;  Start 4/2/20 at 08:15


Sodium Chloride 500 ml @  500 mls/hr 1X PRN  PRN IV ELEVATED BP, SEE COMMENTS;  

Start 4/2/20 at 08:15


Atropine Sulfate (ATROPINE 0.5mg SYRINGE) 0.5 mg PRN Q5MIN  PRN IV SEE COMMENTS;

 Start 4/2/20 at 08:15


Furosemide (Lasix) 20 mg 1X  ONCE IVP  Last administered on 4/2/20at 08:19;  

Start 4/2/20 at 08:15;  Stop 4/2/20 at 08:16;  Status DC


Lidocaine HCl (Buffered Lidocaine 1%) 3 ml STK-MED ONCE .ROUTE ;  Start 4/2/20 

at 08:39;  Stop 4/2/20 at 08:39;  Status DC


Lidocaine HCl (Buffered Lidocaine 1%) 6 ml 1X  ONCE INJ  Last administered on 

4/2/20at 09:05;  Start 4/2/20 at 09:00;  Stop 4/2/20 at 09:06;  Status DC


Sodium Chloride 90 meq/Potassium Chloride 15 meq/ Potassium Phosphate 18 mmol/ 

Magnesium Sulfate 8 meq/Calcium Gluconate 15 meq/ Multivitamins 10 ml/Chromium/ 

Copper/Manganese/ Seleni/Zn 0.5 ml/ Insulin Human Regular 20 unit/ Total 

Parenteral Nutrition/Amino Acids/Dextrose/ Fat Emulsion Intravenous 1,400 ml @  

58.333 mls/ hr TPN  CONT IV  Last administered on 4/2/20at 22:45;  Start 4/2/20 

at 22:00;  Stop 4/3/20 at 21:59;  Status DC


Sodium Chloride 1,000 ml @  1,000 mls/hr Q1H PRN IV hypotension;  Start 4/3/20 

at 07:30;  Stop 4/3/20 at 13:29;  Status DC


Albumin Human 200 ml @  200 mls/hr 1X PRN  PRN IV Hypotension Last administered 

on 4/3/20at 09:36;  Start 4/3/20 at 07:30;  Stop 4/3/20 at 13:29;  Status DC


Sodium Chloride (Normal Saline Flush) 10 ml 1X PRN  PRN IV AP catheter pack;  

Start 4/3/20 at 07:30;  Stop 4/3/20 at 21:29;  Status DC


Sodium Chloride (Normal Saline Flush) 10 ml 1X PRN  PRN IV  catheter pack;  

Start 4/3/20 at 07:30;  Stop 4/4/20 at 07:29;  Status DC


Sodium Chloride 1,000 ml @  400 mls/hr Q2H30M PRN IV PATENCY;  Start 4/3/20 at 

07:30;  Stop 4/3/20 at 19:29;  Status DC


Info (PHARMACY MONITORING -- do not chart) 1 each PRN DAILY  PRN MC SEE 

COMMENTS;  Start 4/3/20 at 07:30;  Stop 4/3/20 at 13:02;  Status DC


Info (PHARMACY MONITORING -- do not chart) 1 each PRN DAILY  PRN MC SEE 

COMMENTS;  Start 4/3/20 at 07:30;  Stop 4/5/20 at 12:45;  Status DC


Sodium Chloride 90 meq/Potassium Chloride 15 meq/ Potassium Phosphate 10 mmol/ 

Magnesium Sulfate 8 meq/Calcium Gluconate 15 meq/ Multivitamins 10 ml/Chromium/ 

Copper/Manganese/ Seleni/Zn 0.5 ml/ Insulin Human Regular 25 unit/ Total 

Parenteral Nutrition/Amino Acids/Dextrose/ Fat Emulsion Intravenous 1,400 ml @  

58.333 mls/ hr TPN  CONT IV  Last administered on 4/3/20at 22:19;  Start 4/3/20 

at 22:00;  Stop 4/4/20 at 21:59;  Status DC


Heparin Sodium (Porcine) (Heparin Sodium) 5,000 unit Q12HR SQ  Last administered

on 4/8/20at 13:22;  Start 4/3/20 at 21:00


Ondansetron HCl (Zofran) 4 mg PRN Q6HRS  PRN IV NAUSEA/VOMITING;  Start 4/6/20 

at 07:00;  Stop 4/7/20 at 06:59;  Status DC


Fentanyl Citrate (Fentanyl 2ml Vial) 25 mcg PRN Q5MIN  PRN IV MILD PAIN 1-3;  

Start 4/6/20 at 07:00;  Stop 4/7/20 at 06:59;  Status DC


Fentanyl Citrate (Fentanyl 2ml Vial) 50 mcg PRN Q5MIN  PRN IV MODERATE TO SEVERE

PAIN;  Start 4/6/20 at 07:00;  Stop 4/7/20 at 06:59;  Status DC


Ringer's Solution 1,000 ml @  30 mls/hr Q24H IV ;  Start 4/6/20 at 07:00;  Stop 

4/6/20 at 18:59;  Status DC


Lidocaine HCl (Xylocaine-Mpf 1% 2ml Vial) 2 ml PRN 1X  PRN ID PRIOR TO IV START;

 Start 4/6/20 at 07:00;  Stop 4/7/20 at 06:59;  Status DC


Prochlorperazine Edisylate (Compazine) 5 mg PACU PRN  PRN IV NAUSEA, MRX1;  

Start 4/6/20 at 07:00;  Stop 4/7/20 at 06:59;  Status DC


Sodium Chloride 1,000 ml @  1,000 mls/hr Q1H PRN IV hypotension;  Start 4/4/20 

at 09:10;  Stop 4/4/20 at 15:09;  Status DC


Albumin Human 200 ml @  200 mls/hr 1X PRN  PRN IV Hypotension Last administered 

on 4/4/20at 10:10;  Start 4/4/20 at 09:15;  Stop 4/4/20 at 15:14;  Status DC


Sodium Chloride 1,000 ml @  400 mls/hr Q2H30M PRN IV PATENCY;  Start 4/4/20 at 

09:10;  Stop 4/4/20 at 21:09;  Status DC


Info (PHARMACY MONITORING -- do not chart) 1 each PRN DAILY  PRN MC SEE 

COMMENTS;  Start 4/4/20 at 09:15;  Stop 4/5/20 at 12:45;  Status DC


Info (PHARMACY MONITORING -- do not chart) 1 each PRN DAILY  PRN MC SEE MIKEY

TS;  Start 4/4/20 at 09:15;  Stop 4/5/20 at 12:45;  Status DC


Sodium Chloride 90 meq/Potassium Chloride 15 meq/ Potassium Phosphate 10 mmol/ 

Magnesium Sulfate 8 meq/Calcium Gluconate 15 meq/ Multivitamins 10 ml/Chromium/ 

Copper/Manganese/ Seleni/Zn 0.5 ml/ Insulin Human Regular 25 unit/ Total 

Parenteral Nutrition/Amino Acids/Dextrose/ Fat Emulsion Intravenous 1,400 ml @  

58.333 mls/ hr TPN  CONT IV  Last administered on 4/4/20at 22:10;  Start 4/4/20 

at 22:00;  Stop 4/5/20 at 21:59;  Status DC


Magnesium Sulfate 50 ml @ 25 mls/hr PRN DAILY  PRN IV for Mag < 1.7 on am labs; 

Start 4/5/20 at 09:15


Sodium Chloride 90 meq/Potassium Chloride 15 meq/ Potassium Phosphate 10 mmol/ 

Magnesium Sulfate 8 meq/Calcium Gluconate 15 meq/ Multivitamins 10 ml/Chromium/ 

Copper/Manganese/ Seleni/Zn 0.5 ml/ Insulin Human Regular 25 unit/ Total 

Parenteral Nutrition/Amino Acids/Dextrose/ Fat Emulsion Intravenous 1,400 ml @  

58.333 mls/ hr TPN  CONT IV  Last administered on 4/5/20at 21:20;  Start 4/5/20 

at 22:00;  Stop 4/6/20 at 21:59;  Status DC


Sodium Chloride 1,000 ml @  1,000 mls/hr Q1H PRN IV hypotension;  Start 4/5/20 

at 12:23;  Stop 4/5/20 at 18:22;  Status DC


Albumin Human 200 ml @  200 mls/hr 1X  ONCE IV  Last administered on 4/5/20at 

13:34;  Start 4/5/20 at 12:30;  Stop 4/5/20 at 13:29;  Status DC


Diphenhydramine HCl (Benadryl) 25 mg 1X PRN  PRN IV ITCHING;  Start 4/5/20 at 

12:30;  Stop 4/6/20 at 12:29;  Status DC


Diphenhydramine HCl (Benadryl) 25 mg 1X PRN  PRN IV ITCHING;  Start 4/5/20 at 

12:30;  Stop 4/6/20 at 12:29;  Status DC


Info (PHARMACY MONITORING -- do not chart) 1 each PRN DAILY  PRN MC SEE 

COMMENTS;  Start 4/5/20 at 12:30;  Status Cancel


Bupivacaine HCl/ Epinephrine Bitart (Sensorcain-Epi 0.5%-1:474722 Mpf) 30 ml 

STK-MED ONCE .ROUTE  Last administered on 4/6/20at 11:44;  Start 4/6/20 at 

11:00;  Stop 4/6/20 at 11:01;  Status DC


Cellulose (Surgicel Fibrillar 1x2) 1 each STK-MED ONCE .ROUTE ;  Start 4/6/20 at

11:00;  Stop 4/6/20 at 11:01;  Status DC


Sodium Chloride 90 meq/Potassium Chloride 15 meq/ Potassium Phosphate 10 mmol/ 

Magnesium Sulfate 12 meq/Calcium Gluconate 15 meq/ Multivitamins 10 ml/Chromium/

Copper/Manganese/ Seleni/Zn 0.5 ml/ Insulin Human Regular 25 unit/ Total 

Parenteral Nutrition/Amino Acids/Dextrose/ Fat Emulsion Intravenous 1,400 ml @  

58.333 mls/ hr TPN  CONT IV  Last administered on 4/6/20at 22:24;  Start 4/6/20 

at 22:00;  Stop 4/7/20 at 21:59;  Status DC


Propofol 20 ml @ As Directed STK-MED ONCE IV ;  Start 4/6/20 at 11:07;  Stop 

4/6/20 at 11:07;  Status DC


Cellulose (Surgicel Hemostat 4x8) 1 each STK-MED ONCE .ROUTE  Last administered 

on 4/6/20at 11:44;  Start 4/6/20 at 11:55;  Stop 4/6/20 at 11:56;  Status DC


Sevoflurane (Ultane) 60 ml STK-MED ONCE IH ;  Start 4/6/20 at 12:46;  Stop 

4/6/20 at 12:46;  Status DC


Sodium Chloride 1,000 ml @  1,000 mls/hr Q1H PRN IV hypotension;  Start 4/6/20 

at 13:51;  Stop 4/6/20 at 19:50;  Status DC


Albumin Human 200 ml @  200 mls/hr 1X PRN  PRN IV Hypotension Last administered 

on 4/6/20at 14:51;  Start 4/6/20 at 14:00;  Stop 4/6/20 at 19:59;  Status DC


Diphenhydramine HCl (Benadryl) 25 mg 1X PRN  PRN IV ITCHING;  Start 4/6/20 at 

14:00;  Stop 4/7/20 at 13:59;  Status DC


Diphenhydramine HCl (Benadryl) 25 mg 1X PRN  PRN IV ITCHING;  Start 4/6/20 at 

14:00;  Stop 4/7/20 at 13:59;  Status DC


Sodium Chloride 1,000 ml @  400 mls/hr Q2H30M PRN IV PATENCY;  Start 4/6/20 at 

13:51;  Stop 4/7/20 at 01:50;  Status DC


Info (PHARMACY MONITORING -- do not chart) 1 each PRN DAILY  PRN MC SEE 

COMMENTS;  Start 4/6/20 at 14:00


Heparin Sodium (Porcine) (Hep Lock Adult) 500 unit STK-MED ONCE IVP ;  Start 

4/7/20 at 09:29;  Stop 4/7/20 at 09:30;  Status DC


Sodium Chloride 1,000 ml @  1,000 mls/hr Q1H PRN IV hypotension;  Start 4/7/20 

at 10:43;  Stop 4/7/20 at 16:42;  Status DC


Sodium Chloride 1,000 ml @  400 mls/hr Q2H30M PRN IV PATENCY;  Start 4/7/20 at 

10:43;  Stop 4/7/20 at 22:42;  Status DC


Info (PHARMACY MONITORING -- do not chart) 1 each PRN DAILY  PRN MC SEE COM

MENTS;  Start 4/7/20 at 10:45;  Status UNV


Info (PHARMACY MONITORING -- do not chart) 1 each PRN DAILY  PRN MC SEE 

COMMENTS;  Start 4/7/20 at 10:45;  Status UNV


Sodium Chloride 90 meq/Potassium Chloride 15 meq/ Magnesium Sulfate 12 

meq/Calcium Gluconate 15 meq/ Multivitamins 10 ml/Chromium/ Copper/Manganese/ 

Seleni/Zn 0.5 ml/ Insulin Human Regular 25 unit/ Total Parenteral Nutritio

n/Amino Acids/Dextrose/ Fat Emulsion Intravenous 1,400 ml @  58.333 mls/ hr TPN 

CONT IV  Last administered on 4/7/20at 22:13;  Start 4/7/20 at 22:00;  Stop 

4/8/20 at 21:59


Sodium Chloride 1,000 ml @  1,000 mls/hr Q1H PRN IV hypotension;  Start 4/8/20 

at 07:50;  Stop 4/8/20 at 13:49;  Status DC


Albumin Human 200 ml @  200 mls/hr 1X  ONCE IV ;  Start 4/8/20 at 08:00;  Stop 

4/8/20 at 08:53;  Status DC


Diphenhydramine HCl (Benadryl) 25 mg 1X PRN  PRN IV ITCHING;  Start 4/8/20 at 

08:00;  Stop 4/9/20 at 07:59


Diphenhydramine HCl (Benadryl) 25 mg 1X PRN  PRN IV ITCHING;  Start 4/8/20 at 

08:00;  Stop 4/9/20 at 07:59


Info (PHARMACY MONITORING -- do not chart) 1 each PRN DAILY  PRN MC SEE 

COMMENTS;  Start 4/8/20 at 08:00


Albumin Human 50 ml @ 50 mls/hr 1X  ONCE IV ;  Start 4/8/20 at 08:53;  Stop 

4/8/20 at 08:56;  Status DC


Albumin Human 200 ml @  50 mls/hr PRN 1X  PRN IV HYPOTENSION Last administered 

on 4/8/20at 09:09;  Start 4/8/20 at 09:00


Meropenem 500 mg/ Sodium Chloride 50 ml @  100 mls/hr Q12H IV  Last administered

on 4/8/20at 13:30;  Start 4/8/20 at 10:00


Sodium Chloride 90 meq/Magnesium Sulfate 12 meq/ Calcium Gluconate 15 meq/ 

Multivitamins 10 ml/Chromium/ Copper/Manganese/ Seleni/Zn 0.5 ml/ Insulin Human 

Regular 25 unit/ Total Parenteral Nutrition/Amino Acids/Dextrose/ Fat Emulsion 

Intravenous 1,400 ml @  58.333 mls/ hr TPN  CONT IV ;  Start 4/8/20 at 22:00;  

Stop 4/9/20 at 21:59





Active Scripts


Active


Reported


Bisoprolol Fumarate 5 Mg Tablet 10 Mg PO DAILY


Vitals/I & O





Vital Sign - Last 24 Hours








 4/7/20 4/7/20 4/7/20 4/7/20





 17:00 17:30 18:00 19:00


 


Pulse 105 106 100 98


 


Resp 18 18 18 18


 


B/P (MAP) 147/80 (102) 156/89 (111) 137/85 (102) 142/90 (107)


 


Pulse Ox 97 98 96 99


 


O2 Delivery Ventilator Ventilator Ventilator Ventilator





 4/7/20 4/7/20 4/7/20 4/7/20





 20:00 20:00 20:42 21:00


 


Temp 98.3   





 98.3   


 


Pulse 98   103


 


Resp 18   17


 


B/P (MAP) 140/92 (108)   126/79 (95)


 


Pulse Ox 99  99 99


 


O2 Delivery Ventilator Mechanical Ventilator Ventilator Ventilator


 


    





    





 4/7/20 4/7/20 4/7/20 4/7/20





 21:08 21:12 22:00 22:05


 


Pulse   105 


 


Resp   18 


 


B/P (MAP)   137/76 (96) 


 


Pulse Ox 98 99 99 98


 


O2 Delivery Ventilator  Ventilator Ventilator





 4/7/20 4/7/20 4/8/20 4/8/20





 23:00 23:49 00:00 00:00


 


Temp    99.1





    99.1


 


Pulse 100   103


 


Resp 18   18


 


B/P (MAP) 109/62 (78)   114/63 (80)


 


Pulse Ox 98 100  99


 


O2 Delivery Ventilator Ventilator Mechanical Ventilator Ventilator


 


    





    





 4/8/20 4/8/20 4/8/20 4/8/20





 01:00 01:36 02:00 03:00


 


Pulse 107  104 104


 


Resp 18  18 18


 


B/P (MAP) 129/70 (89)  115/61 (79) 116/72 (87)


 


Pulse Ox 97 100 99 99


 


O2 Delivery Ventilator Ventilator Ventilator Ventilator





 4/8/20 4/8/20 4/8/20 4/8/20





 04:00 04:00 04:06 04:55


 


Temp 100.2   





 100.2   


 


Pulse 104   


 


Resp 18   


 


B/P (MAP) 113/64 (80)   


 


Pulse Ox 99  99 99


 


O2 Delivery Ventilator Mechanical Ventilator Ventilator 


 


    





    





 4/8/20 4/8/20 4/8/20 4/8/20





 05:00 05:25 06:00 07:00


 


Pulse 104  104 104


 


Resp 19  19 18


 


B/P (MAP) 119/67 (84)  135/72 (93) 112/62 (79)


 


Pulse Ox 99 98 98 99


 


O2 Delivery Ventilator Ventilator Ventilator Ventilator





 4/8/20 4/8/20 4/8/20 4/8/20





 08:00 08:00 08:00 11:54


 


Temp 100.6   





 100.6   


 


Pulse 106   


 


Resp 18   


 


B/P (MAP) 116/62 (80)   


 


Pulse Ox 98  99 100


 


O2 Delivery Ventilator Mechanical Ventilator Ventilator Ventilator


 


    





    





 4/8/20 4/8/20 4/8/20 4/8/20





 12:00 13:00 13:15 13:29


 


Temp  100.8  





  100.8  


 


Pulse  102 112 


 


Resp  18  18


 


B/P (MAP)  137/87 (104) 199/93 (128) 


 


Pulse Ox  99  99


 


O2 Delivery Mechanical Ventilator Ventilator  Ventilator


 


    





    





 4/8/20 4/8/20 4/8/20 4/8/20





 13:30 13:45 14:00 14:01


 


Pulse 100 108 108 


 


Resp 18 18 18 18


 


B/P (MAP) 204/110 (141) 114/66 (82) 94/55 (68) 


 


Pulse Ox 98 98 98 98


 


O2 Delivery Ventilator Ventilator Ventilator Ventilator





 4/8/20 4/8/20 4/8/20 4/8/20





 14:15 14:30 15:00 15:30


 


Pulse 108 108 101 96


 


Resp 18 18 18 18


 


B/P (MAP) 120/72 (88) 132/87 (102) 122/68 (86) 97/55 (69)


 


Pulse Ox 98 98 98 98


 


O2 Delivery Ventilator Ventilator Ventilator Ventilator





 4/8/20 4/8/20 4/8/20 





 15:45 15:53 16:00 


 


Pulse 96   


 


B/P (MAP) 98/56 (70)   


 


Pulse Ox  97  


 


O2 Delivery  Ventilator Mechanical Ventilator 














Intake and Output   


 


 4/7/20 4/7/20 4/8/20





 15:00 23:00 07:00


 


Intake Total  1309.57 ml 1187 ml


 


Output Total 220 ml 230 ml 125 ml


 


Balance -220 ml 1079.57 ml 1062 ml











Hemodynamically unstable?:  No


Is patient in severe pain?:  No


Is NPO status required?:  Yes











HIRAM BARRERA MD              Apr 8, 2020 16:42

## 2020-04-08 NOTE — PDOC
SUBJECTIVE


ROS


bilious output from mouth despite NGT and  febrile





OBJECTIVE


Vital Signs





Vital Signs








  Date Time  Temp Pulse Resp B/P (MAP) Pulse Ox O2 Delivery O2 Flow Rate FiO2


 


4/8/20 08:00     99 Ventilator  


 


4/8/20 08:00 100.6 106 18 116/62 (80)    





 100.6       


 


4/7/20 13:28       6.0 








I & 0











Intake and Output 


 


 4/8/20





 07:00


 


Intake Total 2496.57 ml


 


Output Total 575 ml


 


Balance 1921.57 ml


 


 


 


IV Total 2496.57 ml


 


Output Urine Total 525 ml


 


Gastric Drainage Total 50 ml











PHYSICAL EXAM


Physical Exam


GENERAL: On MV  


HEENT om moist  


NECK: Trach +  


LUNGS: Diminished aeration bases 


HEART:  S1, S2, regular 


ABDOMEN:  Distended, hypoactive BS, Rectal tube in place 


: [place]   


EXTREMITIES: Generalized edema,  some mottling


DERMATOLOGIC:   No generalized rash.  


CENTRAL NERVOUS SYSTEM: Sedated 


RIJ Temp HDC





DIAGNOSIS/ASSESSMENT


Assessment & Plan


ARF-- ATN,    Off CRRT 


Seen on HD, treatment plan discussed and reviewed with Robert  


  TTS SCHEDULE ,No  Renal recovery yet   , Monitor 


 


Anemia- per primary  


Currently on YOLI 





Anasarca due to 3rd spacing 





Hyperkalemia- resolved  





AC Pancreatitis, early developing necrosis


No intervention per GS  





Cholelithiasis





Acute hypoxic resp failure ,bilateral pleural effusion


On MV  





hypocalcemia - stable 





HTN- Hypotensive , pressors as indicated





COVID-19 neg, 3/26





COMMENT/RELEVANT DATA


Meds





Current Medications








 Medications


  (Trade)  Dose


 Ordered  Sig/Yee  Start Time


 Stop Time Status Last Admin


Dose Admin


 


 Acetaminophen


  (Tylenol Supp)  650 mg  PRN Q6HRS  PRN  3/24/20 10:30


    3/24/20 10:37


650 MG


 


 Acetaminophen


  (Tylenol)  650 mg  PRN Q6HRS  PRN  3/21/20 03:36


    3/26/20 07:35


650 MG


 


 Albumin Human  200 ml @ 


 50 mls/hr  PRN 1X  PRN  4/8/20 09:00


    4/8/20 09:09


50 MLS/HR


 


 Albuterol Sulfate


  (Ventolin Neb


 Soln)  2.5 mg  1X  ONCE  3/17/20 22:30


 3/17/20 22:31 DC 3/18/20 00:56


2.5 MG


 


 Alteplase,


 Recombinant


  (Cathflo For


 Central Catheter


 Clearance)  1 mg  1X  ONCE  3/31/20 22:00


 3/31/20 22:01 DC 3/31/20 22:05


1 MG


 


 Artificial Tears


  (Artificial


 Tears)  1 drop  PRN Q1HR  PRN  3/23/20 08:15


     





 


 Atenolol


  (Tenormin)  100 mg  DAILY  3/17/20 09:00


 3/16/20 20:08 DC  





 


 Atropine Sulfate


  (ATROPINE 0.5mg


 SYRINGE)  0.5 mg  PRN Q5MIN  PRN  4/2/20 08:15


     





 


 Benzocaine


  (Hurricaine One)  1 spray  1X  ONCE  3/20/20 14:30


 3/20/20 14:31 DC 3/20/20 16:38


1 SPRAY


 


 Bupivacaine HCl/


 Epinephrine Bitart


  (Sensorcain-Epi


 0.5%-1:488088 Mpf)  30 ml  STK-MED ONCE  4/6/20 11:00


 4/6/20 11:01 DC 4/6/20 11:44


1 ML


 


 Calcium Carbonate/


 Glycine


  (Tums)  500 mg  PRN AFTMEALHC  PRN  3/18/20 17:45


     





 


 Calcium Chloride


 1000 mg/Sodium


 Chloride  110 ml @ 


 220 mls/hr  1X  ONCE  3/17/20 22:30


 3/17/20 22:59 DC 3/17/20 22:11


220 MLS/HR


 


 Calcium Chloride


 3000 mg/Sodium


 Chloride  1,030 ml @ 


 50 mls/hr  [id number]  3/19/20 08:00


 3/21/20 15:23 DC 3/21/20 02:17


50 MLS/HR


 


 Calcium Gluconate


  (Calcium


 Gluconate)  2,000 mg  1X  ONCE  3/19/20 02:15


 3/19/20 02:16 DC 3/19/20 02:19


2,000 MG


 


 Calcium Gluconate


 1000 mg/Sodium


 Chloride  110 ml @ 


 220 mls/hr  1X  ONCE  3/18/20 03:30


 3/18/20 03:59 DC 3/18/20 03:21


220 MLS/HR


 


 Calcium Gluconate


 2000 mg/Sodium


 Chloride  120 ml @ 


 220 mls/hr  1X  ONCE  3/18/20 07:30


 3/18/20 08:02 DC 3/18/20 09:05


220 MLS/HR


 


 Cefepime HCl


  (Maxipime)  2 gm  Q12HR  3/25/20 09:00


    4/7/20 20:56


2 GM


 


 Cellulose


  (Surgicel


 Fibrillar 1x2)  1 each  STK-MED ONCE  4/6/20 11:00


 4/6/20 11:01 DC  





 


 Cellulose


  (Surgicel


 Hemostat 4x8)  1 each  STK-MED ONCE  4/6/20 11:55


 4/6/20 11:56 DC 4/6/20 11:44


1 EACH


 


 Daptomycin 500 mg/


 Sodium Chloride  50 ml @ 


 100 mls/hr  Q48H  3/25/20 08:30


    4/6/20 08:35


100 MLS/HR


 


 Dexmedetomidine


 HCl 400 mcg/


 Sodium Chloride  100 ml @ 0


 mls/hr  CONT  PRN  4/2/20 08:15


    4/8/20 08:25


27.6 MLS/HR


 


 Dextrose


  (Dextrose


 50%-Water Syringe)  12.5 gm  PRN Q15MIN  PRN  3/16/20 09:30


     





 


 Digoxin


  (Lanoxin)  125 mcg  1X  ONCE  3/19/20 18:00


 3/19/20 18:01 DC 3/19/20 17:10


125 MCG


 


 Diphenhydramine


 HCl


  (Benadryl)  25 mg  1X PRN  PRN  4/8/20 08:00


 4/9/20 07:59   





 


 Etomidate


  (Amidate)  8 mg  1X  ONCE  3/23/20 08:30


 3/23/20 08:31 DC 3/23/20 08:33


8 MG


 


 Fentanyl Citrate


  (Fentanyl 2ml


 Vial)  50 mcg  PRN Q5MIN  PRN  4/6/20 07:00


 4/7/20 06:59 DC  





 


 Furosemide


  (Lasix)  20 mg  1X  ONCE  4/2/20 08:15


 4/2/20 08:16 DC 4/2/20 08:19


20 MG


 


 Heparin Sodium


  (Porcine)


  (Hep Lock Adult)  500 unit  STK-MED ONCE  4/7/20 09:29


 4/7/20 09:30 DC  





 


 Heparin Sodium


  (Porcine)


  (Heparin Sodium)  5,000 unit  Q12HR  4/3/20 21:00


    4/7/20 20:56


5,000 UNIT


 


 Hydromorphone HCl


  (Dilaudid)  1 mg  PRN Q3HRS  PRN  3/17/20 12:00


 3/31/20 00:25 DC 3/23/20 05:13


1 MG


 


 Info


  (CONTRAST GIVEN


 -- Rx MONITORING)  1 each  PRN DAILY  PRN  3/30/20 11:45


 4/1/20 11:44 DC  





 


 Info


  (Icu Electrolyte


 Protocol)  1 ea  CONT PRN  PRN  3/29/20 13:15


     





 


 Info


  (PHARMACY


 MONITORING -- do


 not chart)  1 each  PRN DAILY  PRN  4/8/20 08:00


     





 


 Info


  (Tpn Per


 Pharmacy)  1 each  PRN DAILY  PRN  3/18/20 12:30


   UNV  





 


 Insulin Human


 Lispro


  (HumaLOG)  0-9 UNITS  Q6HRS  3/16/20 09:30


    4/8/20 06:26


4 UNITS


 


 Insulin Human


 Regular


  (HumuLIN R VIAL)  5 unit  1X  ONCE  3/17/20 22:30


 3/17/20 22:31 DC 3/17/20 22:14


5 UNIT


 


 Iohexol


  (Omnipaque 240


 Mg/ml)  30 ml  1X  ONCE  3/30/20 11:30


 3/30/20 11:33 DC 3/30/20 11:30


30 ML


 


 Iohexol


  (Omnipaque 300


 Mg/ml)  60 ml  1X  ONCE  3/17/20 17:00


 3/17/20 17:01 DC 3/17/20 17:20


60 ML


 


 Iohexol


  (Omnipaque 350


 Mg/ml)  90 ml  1X  ONCE  3/16/20 03:30


 3/16/20 03:31 DC 3/16/20 03:25


90 ML


 


 Ketorolac


 Tromethamine


  (Toradol 30mg


 Vial)  30 mg  1X  ONCE  3/16/20 03:00


 3/16/20 03:01 DC 3/16/20 02:54


30 MG


 


 Lidocaine HCl


  (Buffered


 Lidocaine 1%)  6 ml  1X  ONCE  4/2/20 09:00


 4/2/20 09:06 DC 4/2/20 09:05


6 ML


 


 Lidocaine HCl


  (Glydo


  (Lidocaine) Jelly)  1 thomas  1X  ONCE  3/20/20 14:30


 3/20/20 14:31 DC 3/20/20 16:38


1 THOMAS


 


 Lidocaine HCl


  (Xylocaine-Mpf


 1% 2ml Vial)  2 ml  PRN 1X  PRN  4/6/20 07:00


 4/7/20 06:59 DC  





 


 Linezolid/Dextrose  300 ml @ 


 300 mls/hr  Q12HR  3/20/20 20:00


 3/27/20 07:50 DC 3/26/20 21:04


300 MLS/HR


 


 Lorazepam


  (Ativan Inj)  1 mg  PRN Q4HRS  PRN  3/19/20 09:00


    3/23/20 00:34


1 MG


 


 Magnesium Sulfate  50 ml @ 25


 mls/hr  PRN DAILY  PRN  4/5/20 09:15


     





 


 Meropenem 1 gm/


 Sodium Chloride  100 ml @ 


 200 mls/hr  Q8HRS  3/17/20 20:00


 3/18/20 08:48 DC 3/18/20 05:45


200 MLS/HR


 


 Meropenem 500 mg/


 Sodium Chloride  50 ml @ 


 100 mls/hr  Q6HRS  3/24/20 09:00


 3/25/20 07:29 DC 3/25/20 06:00


100 MLS/HR


 


 Metoprolol


 Tartrate


  (Lopressor Vial)  5 mg  Q6HRS  3/17/20 10:15


 3/28/20 08:48 DC 3/26/20 00:12


5 MG


 


 Metronidazole  100 ml @ 


 100 mls/hr  Q6HRS  3/23/20 08:30


    4/8/20 06:26


100 MLS/HR


 


 Micafungin Sodium


 100 mg/Dextrose  100 ml @ 


 100 mls/hr  Q24H  3/23/20 09:00


    4/7/20 10:00


100 MLS/HR


 


 Midazolam HCl


  (Versed)  5 mg  1X  ONCE  3/23/20 08:30


 3/23/20 08:31 DC  





 


 Midazolam HCl 100


 mg/Sodium Chloride  100 ml @ 7


 mls/hr  CONT  PRN  3/28/20 16:00


    4/8/20 00:04


7 MLS/HR


 


 Midazolam HCl 50


 mg/Sodium Chloride  50 ml @ 0


 mls/hr  CONT  PRN  3/23/20 08:15


 3/28/20 15:59 DC 3/26/20 22:39


7 MLS/HR


 


 Morphine Sulfate


  (Morphine


 Sulfate)  2 mg  PRN Q2HR  PRN  3/16/20 05:00


 3/17/20 14:15 DC 3/17/20 12:26


2 MG


 


 Multi-Ingred


 Cream/Lotion/Oil/


 Oint


  (Artificial


 Tears Eye


 Ointment)  1 thomas  PRN Q1HR  PRN  3/25/20 17:30


    4/3/20 11:05


1 THOMAS


 


 Norepinephrine


 Bitartrate 8 mg/


 Dextrose  258 ml @ 


 17.299 mls/


 hr  CONT  PRN  3/17/20 15:30


    4/8/20 08:25


13.839 MLS/HR


 


 Ondansetron HCl


  (Zofran)  4 mg  PRN Q6HRS  PRN  4/6/20 07:00


 4/7/20 06:59 DC  





 


 Pantoprazole


 Sodium


  (PROTONIX VIAL


 for IV PUSH)  40 mg  DAILYAC  3/16/20 11:30


    4/8/20 08:30


40 MG


 


 Piperacillin Sod/


 Tazobactam Sod


 4.5 gm/Sodium


 Chloride  100 ml @ 


 200 mls/hr  1X  ONCE  3/16/20 06:00


 3/16/20 06:29 DC 3/16/20 05:44


200 MLS/HR


 


 Potassium


 Chloride 15 meq/


 Bicarbonate


 Dialysis Soln w/


 out KCl  5,007.5 ml


  @ 1,000 mls/


 hr  Q5H1M  3/29/20 20:00


 4/2/20 13:08 DC 4/1/20 18:14


1,000 MLS/HR


 


 Potassium


 Chloride 20 meq/


 Bicarbonate


 Dialysis Soln w/


 out KCl  5,010 ml @ 


 1,000 mls/hr  Q5H1M  3/25/20 16:00


 3/29/20 19:59 DC 3/29/20 14:54


1,000 MLS/HR


 


 Potassium


 Chloride/Water  100 ml @ 


 100 mls/hr  Q1H  3/24/20 11:00


 3/24/20 12:59 DC 3/24/20 12:12


100 MLS/HR


 


 Potassium


 Phosphate 20 mmol/


 Sodium Chloride  106.6667


 ml @ 


 51.667 m...  1X  ONCE  3/25/20 13:00


 3/25/20 15:03 DC 3/25/20 12:51


51.667 MLS/HR


 


 Prochlorperazine


 Edisylate


  (Compazine)  5 mg  PACU PRN  PRN  4/6/20 07:00


 4/7/20 06:59 DC  





 


 Propofol  20 ml @ As


 Directed  STK-MED ONCE  4/6/20 11:07


 4/6/20 11:07 DC  





 


 Ringer's Solution  1,000 ml @ 


 30 mls/hr  Q24H  4/6/20 07:00


 4/6/20 18:59 DC  





 


 Sevoflurane


  (Ultane)  60 ml  STK-MED ONCE  4/6/20 12:46


 4/6/20 12:46 DC  





 


 Sodium


 Bicarbonate 50


 meq/Sodium


 Chloride  1,050 ml @ 


 75 mls/hr  Q14H  3/18/20 07:30


 3/23/20 10:28 DC 3/22/20 21:10


75 MLS/HR


 


 Sodium Chloride  1,000 ml @ 


 1,000 mls/hr  Q1H PRN  4/8/20 07:50


 4/8/20 13:49   





 


 Sodium Chloride


  (Normal Saline


 Flush)  10 ml  1X PRN  PRN  4/3/20 07:30


 4/4/20 07:29 DC  





 


 Sodium Chloride


 90 meq/Calcium


 Gluconate 10 meq/


 Multivitamins 10


 ml/Chromium/


 Copper/Manganese/


 Seleni/Zn 0.5 ml/


 Total Parenteral


 Nutrition/Amino


 Acids/Dextrose/


 Fat Emulsion


 Intravenous  1,512 ml @ 


 63 mls/hr  TPN  CONT  3/18/20 22:00


 3/19/20 21:59 DC 3/18/20 22:06


63 MLS/HR


 


 Sodium Chloride


 90 meq/Calcium


 Gluconate 10 meq/


 Multivitamins 10


 ml/Chromium/


 Copper/Manganese/


 Seleni/Zn 1 ml/


 Total Parenteral


 Nutrition/Amino


 Acids/Dextrose/


 Fat Emulsion


 Intravenous  55.005 ml


  @ 2.292


 mls/hr  TPN  CONT  3/18/20 22:00


 3/18/20 12:33 DC  





 


 Sodium Chloride


 90 meq/Magnesium


 Sulfate 10 meq/


 Calcium Gluconate


 20 meq/


 Multivitamins 10


 ml/Chromium/


 Copper/Manganese/


 Seleni/Zn 0.5 ml/


 Total Parenteral


 Nutrition/Amino


 Acids/Dextrose/


 Fat Emulsion


 Intravenous  1,512 ml @ 


 63 mls/hr  TPN  CONT  3/19/20 22:00


 3/20/20 21:59 DC 3/19/20 22:25


63 MLS/HR


 


 Sodium Chloride


 90 meq/Potassium


 Chloride 15 meq/


 Magnesium Sulfate


 12 meq/Calcium


 Gluconate 15 meq/


 Multivitamins 10


 ml/Chromium/


 Copper/Manganese/


 Seleni/Zn 0.5 ml/


 Insulin Human


 Regular 25 unit/


 Total Parenteral


 Nutrition/Amino


 Acids/Dextrose/


 Fat Emulsion


 Intravenous  1,400 ml @ 


 58.333 mls/


 hr  TPN  CONT  4/7/20 22:00


 4/8/20 21:59  4/7/20 22:13


58.333 MLS/HR


 


 Sodium Chloride


 90 meq/Potassium


 Chloride 15 meq/


 Potassium


 Phosphate 10 mmol/


 Magnesium Sulfate


 8 meq/Calcium


 Gluconate 15 meq/


 Multivitamins 10


 ml/Chromium/


 Copper/Manganese/


 Seleni/Zn 0.5 ml/


 Insulin Human


 Regular 25 unit/


 Total Parenteral


 Nutrition/Amino


 Acids/Dextrose/


 Fat Emulsion


 Intravenous  1,400 ml @ 


 58.333 mls/


 hr  TPN  CONT  4/5/20 22:00


 4/6/20 21:59 DC 4/5/20 21:20


58.333 MLS/HR


 


 Sodium Chloride


 90 meq/Potassium


 Chloride 15 meq/


 Potassium


 Phosphate 10 mmol/


 Magnesium Sulfate


 10 meq/Calcium


 Gluconate 20 meq/


 Multivitamins 10


 ml/Chromium/


 Copper/Manganese/


 Seleni/Zn 0.5 ml/


 Total Parenteral


 Nutrition/Amino


 Acids/Dextrose/


 Fat Emulsion


 Intravenous  1,400 ml @ 


 58.333 mls/


 hr  TPN  CONT  3/23/20 22:00


 3/24/20 21:59 DC 3/23/20 21:42


58.333 MLS/HR


 


 Sodium Chloride


 90 meq/Potassium


 Chloride 15 meq/


 Potassium


 Phosphate 10 mmol/


 Magnesium Sulfate


 12 meq/Calcium


 Gluconate 15 meq/


 Multivitamins 10


 ml/Chromium/


 Copper/Manganese/


 Seleni/Zn 0.5 ml/


 Insulin Human


 Regular 25 unit/


 Total Parenteral


 Nutrition/Amino


 Acids/Dextrose/


 Fat Emulsion


 Intravenous  1,400 ml @ 


 58.333 mls/


 hr  TPN  CONT  4/6/20 22:00


 4/7/20 21:59 DC 4/6/20 22:24


58.333 MLS/HR


 


 Sodium Chloride


 90 meq/Potassium


 Chloride 15 meq/


 Potassium


 Phosphate 15 mmol/


 Magnesium Sulfate


 10 meq/Calcium


 Gluconate 15 meq/


 Multivitamins 10


 ml/Chromium/


 Copper/Manganese/


 Seleni/Zn 0.5 ml/


 Total Parenteral


 Nutrition/Amino


 Acids/Dextrose/


 Fat Emulsion


 Intravenous  1,400 ml @ 


 58.333 mls/


 hr  TPN  CONT  3/24/20 22:00


 3/25/20 21:59 DC 3/24/20 22:17


58.333 MLS/HR


 


 Sodium Chloride


 90 meq/Potassium


 Chloride 15 meq/


 Potassium


 Phosphate 15 mmol/


 Magnesium Sulfate


 10 meq/Calcium


 Gluconate 20 meq/


 Multivitamins 10


 ml/Chromium/


 Copper/Manganese/


 Seleni/Zn 0.5 ml/


 Total Parenteral


 Nutrition/Amino


 Acids/Dextrose/


 Fat Emulsion


 Intravenous  1,200 ml @ 


 50 mls/hr  TPN  CONT  3/22/20 22:00


 3/22/20 14:17 DC  





 


 Sodium Chloride


 90 meq/Potassium


 Chloride 15 meq/


 Potassium


 Phosphate 18 mmol/


 Magnesium Sulfate


 8 meq/Calcium


 Gluconate 15 meq/


 Multivitamins 10


 ml/Chromium/


 Copper/Manganese/


 Seleni/Zn 0.5 ml/


 Insulin Human


 Regular 10 unit/


 Total Parenteral


 Nutrition/Amino


 Acids/Dextrose/


 Fat Emulsion


 Intravenous  1,400 ml @ 


 58.333 mls/


 hr  TPN  CONT  3/27/20 22:00


 3/28/20 21:59 DC 3/27/20 21:43


58.333 MLS/HR


 


 Sodium Chloride


 90 meq/Potassium


 Chloride 15 meq/


 Potassium


 Phosphate 18 mmol/


 Magnesium Sulfate


 8 meq/Calcium


 Gluconate 15 meq/


 Multivitamins 10


 ml/Chromium/


 Copper/Manganese/


 Seleni/Zn 0.5 ml/


 Insulin Human


 Regular 15 unit/


 Total Parenteral


 Nutrition/Amino


 Acids/Dextrose/


 Fat Emulsion


 Intravenous  1,400 ml @ 


 58.333 mls/


 hr  TPN  CONT  3/30/20 22:00


 3/31/20 21:59 DC 3/30/20 21:47


58.333 MLS/HR


 


 Sodium Chloride


 90 meq/Potassium


 Chloride 15 meq/


 Potassium


 Phosphate 18 mmol/


 Magnesium Sulfate


 8 meq/Calcium


 Gluconate 15 meq/


 Multivitamins 10


 ml/Chromium/


 Copper/Manganese/


 Seleni/Zn 0.5 ml/


 Insulin Human


 Regular 20 unit/


 Total Parenteral


 Nutrition/Amino


 Acids/Dextrose/


 Fat Emulsion


 Intravenous  1,400 ml @ 


 58.333 mls/


 hr  TPN  CONT  4/2/20 22:00


 4/3/20 21:59 DC 4/2/20 22:45


58.333 MLS/HR


 


 Sodium Chloride


 90 meq/Potassium


 Chloride 15 meq/


 Potassium


 Phosphate 18 mmol/


 Magnesium Sulfate


 8 meq/Calcium


 Gluconate 15 meq/


 Multivitamins 10


 ml/Chromium/


 Copper/Manganese/


 Seleni/Zn 0.5 ml/


 Total Parenteral


 Nutrition/Amino


 Acids/Dextrose/


 Fat Emulsion


 Intravenous  1,400 ml @ 


 58.333 mls/


 hr  TPN  CONT  3/26/20 22:00


 3/27/20 21:59 DC 3/26/20 22:00


58.333 MLS/HR


 


 Succinylcholine


 Chloride


  (Anectine)  120 mg  1X  ONCE  3/23/20 08:30


 3/23/20 08:31 DC 3/23/20 08:34


120 MG








Lab





Laboratory Tests








Test


 4/7/20


18:03 4/7/20


23:40 4/8/20


06:10 4/8/20


06:17


 


Glucose (Fingerstick)


 134 mg/dL


(70-99) 167 mg/dL


(70-99) 


 182 mg/dL


(70-99)


 


Sodium Level


 


 


 135 mmol/L


(136-145) 





 


Potassium Level


 


 


 4.9 mmol/L


(3.5-5.1) 





 


Chloride Level


 


 


 102 mmol/L


() 





 


Carbon Dioxide Level


 


 


 24 mmol/L


(21-32) 





 


Anion Gap   9 (6-14)  


 


Blood Urea Nitrogen


 


 


 61 mg/dL


(7-20) 





 


Creatinine


 


 


 2.0 mg/dL


(0.6-1.0) 





 


Estimated GFR


(Cockcroft-Gault) 


 


 26.5 


 





 


Glucose Level


 


 


 187 mg/dL


(70-99) 





 


Calcium Level


 


 


 8.7 mg/dL


(8.5-10.1) 





 


Phosphorus Level


 


 


 4.8 mg/dL


(2.6-4.7) 





 


Magnesium Level


 


 


 1.9 mg/dL


(1.8-2.4) 





 


Albumin


 


 


 2.3 g/dL


(3.4-5.0) 





 


Test


 4/8/20


08:25 


 


 





 


O2 Saturation 99 % (92-99)    


 


Arterial Blood pH


 7.34


(7.35-7.45) 


 


 





 


Arterial Blood pCO2 at


Patient Temp 36 mmHg


(35-46) 


 


 





 


Arterial Blood pO2 at Patient


Temp 242 mmHg


() 


 


 





 


Arterial Blood HCO3


 19 mmol/L


(21-28) 


 


 





 


Arterial Blood Base Excess


 -6 mmol/L


(-3-3) 


 


 





 


FiO2 40    








Results


All relevant outside records, renal labs, imaging studies, telemetry/EKG's were 

reviewed.











KIMBERLY MYERS MD                 Apr 8, 2020 09:34

## 2020-04-08 NOTE — PDOC
Objective:


Objective:


Nurse notes bilious output from mouth despite NGT, notes fever.


Vital Signs:





                                   Vital Signs








  Date Time  Temp Pulse Resp B/P (MAP) Pulse Ox O2 Delivery O2 Flow Rate FiO2


 


4/8/20 08:00     99 Ventilator  


 


4/8/20 08:00 100.6 106 18 116/62 (80)    





 100.6       


 


4/7/20 13:28       6.0 








Labs:





Laboratory Tests








Test


 4/7/20


18:03 4/7/20


23:40 4/8/20


06:10 4/8/20


06:17


 


Glucose (Fingerstick) 134 mg/dL  167 mg/dL   182 mg/dL 


 


Sodium Level   135 mmol/L  


 


Potassium Level   4.9 mmol/L  


 


Chloride Level   102 mmol/L  


 


Carbon Dioxide Level   24 mmol/L  


 


Anion Gap   9  


 


Blood Urea Nitrogen   61 mg/dL  


 


Creatinine   2.0 mg/dL  


 


Estimated GFR


(Cockcroft-Gault) 


 


 26.5 


 





 


Glucose Level   187 mg/dL  


 


Calcium Level   8.7 mg/dL  


 


Phosphorus Level   4.8 mg/dL  


 


Magnesium Level   1.9 mg/dL  


 


Albumin   2.3 g/dL  


 


Test


 4/8/20


08:25 


 


 





 


O2 Saturation 99 %    


 


Arterial Blood pH 7.34    


 


Arterial Blood pCO2 at


Patient Temp 36 mmHg 


 


 


 





 


Arterial Blood pO2 at Patient


Temp 242 mmHg 


 


 


 





 


Arterial Blood HCO3 19 mmol/L    


 


Arterial Blood Base Excess -6 mmol/L    


 


FiO2 40    





  URINE CULTURE RES 1  Final  


        No growth








  BLOOD CULTURE  Preliminary  


        NO GROWTH AFTER 3 DAYS


Imaging:


KUB 4/7


Impression: 


1. NG tube extends into the distal stomach


2. Non obstructive bowel gas pattern.





PE:





GEN: dialyzing


LUNGS: trach/vent, clear


HEART: mildly tachycardic


ABD: distended, rectal tube dark (same)


NEURO/PSYCH: sedated





A/P:


Gallstone pancreatitis, MOSF





--


Continue same.





Hemodynamically unstable?:  No


Is patient in severe pain?:  No


Is NPO status required?:  Yes











RAGHAVENDRA MURCIA          Apr 8, 2020 10:03

## 2020-04-08 NOTE — NUR
SS following up with discharge planning. SS discussed with RN. [facility] following for self pay 
status. Pt's daughters working to obtain court order to get access to pt's financial records 
and once court order is received pt's daughters will be able to assist with completing 
Medicaid application. Pt had trach placement on 4/6/2020. Pt is on hemodialysis 3x per week. 
SS will continue to follow for discharge planning.

## 2020-04-09 VITALS — DIASTOLIC BLOOD PRESSURE: 57 MMHG | SYSTOLIC BLOOD PRESSURE: 99 MMHG

## 2020-04-09 VITALS — DIASTOLIC BLOOD PRESSURE: 53 MMHG | SYSTOLIC BLOOD PRESSURE: 114 MMHG

## 2020-04-09 VITALS — DIASTOLIC BLOOD PRESSURE: 56 MMHG | SYSTOLIC BLOOD PRESSURE: 86 MMHG

## 2020-04-09 VITALS — SYSTOLIC BLOOD PRESSURE: 125 MMHG | DIASTOLIC BLOOD PRESSURE: 63 MMHG

## 2020-04-09 VITALS — SYSTOLIC BLOOD PRESSURE: 124 MMHG | DIASTOLIC BLOOD PRESSURE: 66 MMHG

## 2020-04-09 VITALS — DIASTOLIC BLOOD PRESSURE: 66 MMHG | SYSTOLIC BLOOD PRESSURE: 117 MMHG

## 2020-04-09 VITALS — SYSTOLIC BLOOD PRESSURE: 123 MMHG | DIASTOLIC BLOOD PRESSURE: 70 MMHG

## 2020-04-09 VITALS — DIASTOLIC BLOOD PRESSURE: 68 MMHG | SYSTOLIC BLOOD PRESSURE: 113 MMHG

## 2020-04-09 VITALS — DIASTOLIC BLOOD PRESSURE: 65 MMHG | SYSTOLIC BLOOD PRESSURE: 122 MMHG

## 2020-04-09 VITALS — DIASTOLIC BLOOD PRESSURE: 59 MMHG | SYSTOLIC BLOOD PRESSURE: 101 MMHG

## 2020-04-09 VITALS — SYSTOLIC BLOOD PRESSURE: 108 MMHG | DIASTOLIC BLOOD PRESSURE: 59 MMHG

## 2020-04-09 VITALS — DIASTOLIC BLOOD PRESSURE: 56 MMHG | SYSTOLIC BLOOD PRESSURE: 110 MMHG

## 2020-04-09 VITALS — DIASTOLIC BLOOD PRESSURE: 56 MMHG | SYSTOLIC BLOOD PRESSURE: 112 MMHG

## 2020-04-09 VITALS — SYSTOLIC BLOOD PRESSURE: 91 MMHG | DIASTOLIC BLOOD PRESSURE: 70 MMHG

## 2020-04-09 VITALS — SYSTOLIC BLOOD PRESSURE: 106 MMHG | DIASTOLIC BLOOD PRESSURE: 64 MMHG

## 2020-04-09 VITALS — DIASTOLIC BLOOD PRESSURE: 58 MMHG | SYSTOLIC BLOOD PRESSURE: 130 MMHG

## 2020-04-09 VITALS — SYSTOLIC BLOOD PRESSURE: 111 MMHG | DIASTOLIC BLOOD PRESSURE: 65 MMHG

## 2020-04-09 VITALS — SYSTOLIC BLOOD PRESSURE: 118 MMHG | DIASTOLIC BLOOD PRESSURE: 58 MMHG

## 2020-04-09 VITALS — SYSTOLIC BLOOD PRESSURE: 100 MMHG | DIASTOLIC BLOOD PRESSURE: 54 MMHG

## 2020-04-09 VITALS — SYSTOLIC BLOOD PRESSURE: 105 MMHG | DIASTOLIC BLOOD PRESSURE: 71 MMHG

## 2020-04-09 VITALS — DIASTOLIC BLOOD PRESSURE: 52 MMHG | SYSTOLIC BLOOD PRESSURE: 100 MMHG

## 2020-04-09 VITALS — SYSTOLIC BLOOD PRESSURE: 94 MMHG | DIASTOLIC BLOOD PRESSURE: 53 MMHG

## 2020-04-09 LAB
ALBUMIN SERPL-MCNC: 2.6 G/DL (ref 3.4–5)
ANION GAP SERPL CALC-SCNC: 9 MMOL/L (ref 6–14)
BASE EXCESS ABG: 2 MMOL/L (ref -3–3)
BUN SERPL-MCNC: 51 MG/DL (ref 7–20)
CALCIUM SERPL-MCNC: 8.7 MG/DL (ref 8.5–10.1)
CHLORIDE SERPL-SCNC: 99 MMOL/L (ref 98–107)
CO2 SERPL-SCNC: 29 MMOL/L (ref 21–32)
CREAT SERPL-MCNC: 2.2 MG/DL (ref 0.6–1)
ERYTHROCYTE [DISTWIDTH] IN BLOOD BY AUTOMATED COUNT: 20.1 % (ref 11.5–14.5)
GFR SERPLBLD BASED ON 1.73 SQ M-ARVRAT: 23.7 ML/MIN
GLUCOSE SERPL-MCNC: 211 MG/DL (ref 70–99)
HCO3 BLDA-SCNC: 27 MMOL/L (ref 21–28)
HCT VFR BLD CALC: 22.9 % (ref 36–47)
HGB BLD-MCNC: 7.4 G/DL (ref 12–15.5)
INSPIRATION SETTING TIME VENT: 35
MAGNESIUM SERPL-MCNC: 2.1 MG/DL (ref 1.8–2.4)
MCH RBC QN AUTO: 32 PG (ref 25–35)
MCHC RBC AUTO-ENTMCNC: 32 G/DL (ref 31–37)
MCV RBC AUTO: 100 FL (ref 79–100)
PCO2 BLDA: 45 MMHG (ref 35–46)
PHOSPHATE SERPL-MCNC: 3.6 MG/DL (ref 2.6–4.7)
PLATELET # BLD AUTO: 328 X10^3/UL (ref 140–400)
PO2 BLDA: 69 MMHG (ref 75–108)
POTASSIUM SERPL-SCNC: 3.7 MMOL/L (ref 3.5–5.1)
RBC # BLD AUTO: 2.3 X10^6/UL (ref 3.5–5.4)
SAO2 % BLDA: 92 % (ref 92–99)
SODIUM SERPL-SCNC: 137 MMOL/L (ref 136–145)
WBC # BLD AUTO: 12 X10^3/UL (ref 4–11)

## 2020-04-09 RX ADMIN — DEXMEDETOMIDINE HYDROCHLORIDE PRN MLS/HR: 100 INJECTION, SOLUTION, CONCENTRATE INTRAVENOUS at 01:23

## 2020-04-09 RX ADMIN — ACETAMINOPHEN PRN MG: 650 SUPPOSITORY RECTAL at 22:30

## 2020-04-09 RX ADMIN — MEROPENEM SCH MLS/HR: 500 INJECTION, POWDER, FOR SOLUTION INTRAVENOUS at 22:12

## 2020-04-09 RX ADMIN — HEPARIN SODIUM SCH UNIT: 5000 INJECTION, SOLUTION INTRAVENOUS; SUBCUTANEOUS at 14:56

## 2020-04-09 RX ADMIN — INSULIN LISPRO SCH UNITS: 100 INJECTION, SOLUTION INTRAVENOUS; SUBCUTANEOUS at 18:00

## 2020-04-09 RX ADMIN — AMIODARONE HYDROCHLORIDE PRN MLS/HR: 50 INJECTION, SOLUTION INTRAVENOUS at 12:39

## 2020-04-09 RX ADMIN — PANTOPRAZOLE SODIUM SCH MG: 40 INJECTION, POWDER, FOR SOLUTION INTRAVENOUS at 14:51

## 2020-04-09 RX ADMIN — MEROPENEM SCH MLS/HR: 500 INJECTION, POWDER, FOR SOLUTION INTRAVENOUS at 14:51

## 2020-04-09 RX ADMIN — DEXTROSE SCH MLS/HR: 50 INJECTION, SOLUTION INTRAVENOUS at 14:51

## 2020-04-09 RX ADMIN — DEXMEDETOMIDINE HYDROCHLORIDE PRN MLS/HR: 100 INJECTION, SOLUTION, CONCENTRATE INTRAVENOUS at 05:14

## 2020-04-09 RX ADMIN — DEXMEDETOMIDINE HYDROCHLORIDE PRN MLS/HR: 100 INJECTION, SOLUTION, CONCENTRATE INTRAVENOUS at 13:36

## 2020-04-09 RX ADMIN — HEPARIN SODIUM SCH UNIT: 5000 INJECTION, SOLUTION INTRAVENOUS; SUBCUTANEOUS at 20:55

## 2020-04-09 RX ADMIN — DEXMEDETOMIDINE HYDROCHLORIDE PRN MLS/HR: 100 INJECTION, SOLUTION, CONCENTRATE INTRAVENOUS at 20:53

## 2020-04-09 RX ADMIN — Medication PRN EACH: at 12:53

## 2020-04-09 RX ADMIN — DEXMEDETOMIDINE HYDROCHLORIDE PRN MLS/HR: 100 INJECTION, SOLUTION, CONCENTRATE INTRAVENOUS at 10:00

## 2020-04-09 RX ADMIN — INSULIN LISPRO SCH UNITS: 100 INJECTION, SOLUTION INTRAVENOUS; SUBCUTANEOUS at 06:25

## 2020-04-09 RX ADMIN — INSULIN LISPRO SCH UNITS: 100 INJECTION, SOLUTION INTRAVENOUS; SUBCUTANEOUS at 00:06

## 2020-04-09 RX ADMIN — INSULIN LISPRO SCH UNITS: 100 INJECTION, SOLUTION INTRAVENOUS; SUBCUTANEOUS at 12:00

## 2020-04-09 NOTE — PDOC
Infectious Disease Note


Subjective:


Subjective


Sedated and intubated,  


T max 99.8


TPN 


Rectal tube





Vital Signs:


Vital Signs





Vital Signs








  Date Time  Temp Pulse Resp B/P (MAP) Pulse Ox O2 Delivery O2 Flow Rate FiO2


 


4/9/20 10:26     98   


 


4/9/20 08:08      Ventilator  


 


4/9/20 08:00 99.8 95 18 94/53 (67)    





 99.8       











Physical Exam:


PHYSICAL EXAM


GENERAL: Orally intubated/sedated - generalized anasarca 


HEENT: Pupils equal, ETT, dobhoff +


NECK:  Supple 


LUNGS: Diminished aeration bases 


HEART:  S1, S2, regular 


ABDOMEN:  Distended, hypoactive BS, Rectal tube in place 


: [place]   


EXTREMITIES: Generalized edema, no cyanosis - some mottling, [place] boots & SCDs 

bilaterally 


DERMATOLOGIC:  Warm and dry.  No generalized rash.  


CENTRAL NERVOUS SYSTEM: Sedated 


RIJ Temp HDC, RUE-PICC  


Lt IJ removed 4/7


Chestwall line present





Medications:


Inpatient Meds:





Current Medications








 Medications


  (Trade)  Dose


 Ordered  Sig/Yee  Start Time


 Stop Time Status Last Admin


Dose Admin


 


 Acetaminophen


  (Tylenol Supp)  650 mg  PRN Q6HRS  PRN  3/24/20 10:30


    3/24/20 10:37


650 MG


 


 Acetaminophen


  (Tylenol)  650 mg  PRN Q6HRS  PRN  3/21/20 03:36


    3/26/20 07:35


650 MG


 


 Albumin Human  200 ml @ 


 200 mls/hr  1X PRN  PRN  4/9/20 08:00


 4/9/20 13:59  4/9/20 09:30


200 MLS/HR


 


 Albuterol Sulfate


  (Ventolin Neb


 Soln)  2.5 mg  1X  ONCE  3/17/20 22:30


 3/17/20 22:31 DC 3/18/20 00:56


2.5 MG


 


 Alteplase,


 Recombinant


  (Cathflo For


 Central Catheter


 Clearance)  1 mg  1X  ONCE  3/31/20 22:00


 3/31/20 22:01 DC 3/31/20 22:05


1 MG


 


 Artificial Tears


  (Artificial


 Tears)  1 drop  PRN Q1HR  PRN  3/23/20 08:15


     





 


 Atenolol


  (Tenormin)  100 mg  DAILY  3/17/20 09:00


 3/16/20 20:08 DC  





 


 Atropine Sulfate


  (ATROPINE 0.5mg


 SYRINGE)  0.5 mg  PRN Q5MIN  PRN  4/2/20 08:15


     





 


 Benzocaine


  (Hurricaine One)  1 spray  1X  ONCE  3/20/20 14:30


 3/20/20 14:31 DC 3/20/20 16:38


1 SPRAY


 


 Bupivacaine HCl/


 Epinephrine Bitart


  (Sensorcain-Epi


 0.5%-1:057101 Mpf)  30 ml  STK-MED ONCE  4/6/20 11:00


 4/6/20 11:01 DC 4/6/20 11:44


1 ML


 


 Calcium Carbonate/


 Glycine


  (Tums)  500 mg  PRN [id number]  PRN  3/18/20 17:45


     





 


 Calcium Chloride


 1000 mg/Sodium


 Chloride  110 ml @ 


 220 mls/hr  1X  ONCE  3/17/20 22:30


 3/17/20 22:59 DC 3/17/20 22:11


220 MLS/HR


 


 Calcium Chloride


 3000 mg/Sodium


 Chloride  1,030 ml @ 


 50 mls/hr  [id number]  3/19/20 08:00


 3/21/20 15:23 DC 3/21/20 02:17


50 MLS/HR


 


 Calcium Gluconate


  (Calcium


 Gluconate)  2,000 mg  1X  ONCE  3/19/20 02:15


 3/19/20 02:16 DC 3/19/20 02:19


2,000 MG


 


 Calcium Gluconate


 1000 mg/Sodium


 Chloride  110 ml @ 


 220 mls/hr  1X  ONCE  3/18/20 03:30


 3/18/20 03:59 DC 3/18/20 03:21


220 MLS/HR


 


 Calcium Gluconate


 2000 mg/Sodium


 Chloride  120 ml @ 


 220 mls/hr  1X  ONCE  3/18/20 07:30


 3/18/20 08:02 DC 3/18/20 09:05


220 MLS/HR


 


 Cefepime HCl


  (Maxipime)  2 gm  Q12HR  3/25/20 09:00


 4/8/20 09:58 DC 4/7/20 20:56


2 GM


 


 Cellulose


  (Surgicel


 Fibrillar 1x2)  1 each  STK-MED ONCE  4/6/20 11:00


 4/6/20 11:01 DC  





 


 Cellulose


  (Surgicel


 Hemostat 4x8)  1 each  STK-MED ONCE  4/6/20 11:55


 4/6/20 11:56 DC 4/6/20 11:44


1 EACH


 


 Daptomycin 500 mg/


 Sodium Chloride  50 ml @ 


 100 mls/hr  Q48H  3/25/20 08:30


    4/8/20 14:02


100 MLS/HR


 


 Dexmedetomidine


 HCl 400 mcg/


 Sodium Chloride  100 ml @ 0


 mls/hr  CONT  PRN  4/2/20 08:15


    4/9/20 10:00


27.8 MLS/HR


 


 Dextrose


  (Dextrose


 50%-Water Syringe)  12.5 gm  PRN Q15MIN  PRN  3/16/20 09:30


     





 


 Digoxin


  (Lanoxin)  125 mcg  1X  ONCE  3/19/20 18:00


 3/19/20 18:01 DC 3/19/20 17:10


125 MCG


 


 Diphenhydramine


 HCl


  (Benadryl)  25 mg  1X PRN  PRN  4/8/20 08:00


 4/9/20 07:59 DC  





 


 Etomidate


  (Amidate)  8 mg  1X  ONCE  3/23/20 08:30


 3/23/20 08:31 DC 3/23/20 08:33


8 MG


 


 Fentanyl Citrate


  (Fentanyl 2ml


 Vial)  50 mcg  PRN Q5MIN  PRN  4/6/20 07:00


 4/7/20 06:59 DC  





 


 Furosemide


  (Lasix)  20 mg  1X  ONCE  4/2/20 08:15


 4/2/20 08:16 DC 4/2/20 08:19


20 MG


 


 Heparin Sodium


  (Porcine)


  (Hep Lock Adult)  500 unit  STK-MED ONCE  4/7/20 09:29


 4/7/20 09:30 DC  





 


 Heparin Sodium


  (Porcine)


  (Heparin Sodium)  5,000 unit  Q12HR  4/3/20 21:00


    4/8/20 20:59


5,000 UNIT


 


 Hydromorphone HCl


  (Dilaudid)  1 mg  PRN Q3HRS  PRN  3/17/20 12:00


 3/31/20 00:25 DC 3/23/20 05:13


1 MG


 


 Info


  (CONTRAST GIVEN


 -- Rx MONITORING)  1 each  PRN DAILY  PRN  3/30/20 11:45


 4/1/20 11:44 DC  





 


 Info


  (Icu Electrolyte


 Protocol)  1 ea  CONT PRN  PRN  3/29/20 13:15


     





 


 Info


  (PHARMACY


 MONITORING -- do


 not chart)  1 each  PRN DAILY  PRN  4/9/20 08:15


   UNV  





 


 Info


  (Tpn Per


 Pharmacy)  1 each  PRN DAILY  PRN  3/18/20 12:30


   UNV  





 


 Insulin Human


 Lispro


  (HumaLOG)  0-9 UNITS  Q6HRS  3/16/20 09:30


    4/9/20 06:25


5 UNITS


 


 Insulin Human


 Regular


  (HumuLIN R VIAL)  5 unit  1X  ONCE  3/17/20 22:30


 3/17/20 22:31 DC 3/17/20 22:14


5 UNIT


 


 Iohexol


  (Omnipaque 240


 Mg/ml)  30 ml  1X  ONCE  3/30/20 11:30


 3/30/20 11:33 DC 3/30/20 11:30


30 ML


 


 Iohexol


  (Omnipaque 300


 Mg/ml)  60 ml  1X  ONCE  3/17/20 17:00


 3/17/20 17:01 DC 3/17/20 17:20


60 ML


 


 Iohexol


  (Omnipaque 350


 Mg/ml)  90 ml  1X  ONCE  3/16/20 03:30


 3/16/20 03:31 DC 3/16/20 03:25


90 ML


 


 Ketorolac


 Tromethamine


  (Toradol 30mg


 Vial)  30 mg  1X  ONCE  3/16/20 03:00


 3/16/20 03:01 DC 3/16/20 02:54


30 MG


 


 Lidocaine HCl


  (Buffered


 Lidocaine 1%)  6 ml  1X  ONCE  4/2/20 09:00


 4/2/20 09:06 DC 4/2/20 09:05


6 ML


 


 Lidocaine HCl


  (Glydo


  (Lidocaine) Jelly)  1 thomas  1X  ONCE  3/20/20 14:30


 3/20/20 14:31 DC 3/20/20 16:38


1 THOMAS


 


 Lidocaine HCl


  (Xylocaine-Mpf


 1% 2ml Vial)  2 ml  PRN 1X  PRN  4/6/20 07:00


 4/7/20 06:59 DC  





 


 Linezolid/Dextrose  300 ml @ 


 300 mls/hr  Q12HR  3/20/20 20:00


 3/27/20 07:50 DC 3/26/20 21:04


300 MLS/HR


 


 Lorazepam


  (Ativan Inj)  1 mg  PRN Q4HRS  PRN  3/19/20 09:00


    3/23/20 00:34


1 MG


 


 Magnesium Sulfate  50 ml @ 25


 mls/hr  PRN DAILY  PRN  4/5/20 09:15


     





 


 Meropenem 1 gm/


 Sodium Chloride  100 ml @ 


 200 mls/hr  Q8HRS  3/17/20 20:00


 3/18/20 08:48 DC 3/18/20 05:45


200 MLS/HR


 


 Meropenem 500 mg/


 Sodium Chloride  50 ml @ 


 100 mls/hr  Q12H  4/8/20 10:00


    4/8/20 21:51


100 MLS/HR


 


 Metoprolol


 Tartrate


  (Lopressor Vial)  5 mg  Q6HRS  3/17/20 10:15


 3/28/20 08:48 DC 3/26/20 00:12


5 MG


 


 Metronidazole  100 ml @ 


 100 mls/hr  Q6HRS  3/23/20 08:30


 4/8/20 09:58 DC 4/8/20 06:26


100 MLS/HR


 


 Micafungin Sodium


 100 mg/Dextrose  100 ml @ 


 100 mls/hr  Q24H  3/23/20 09:00


    4/8/20 14:28


100 MLS/HR


 


 Midazolam HCl


  (Versed)  5 mg  1X  ONCE  3/23/20 08:30


 3/23/20 08:31 DC  





 


 Midazolam HCl 100


 mg/Sodium Chloride  100 ml @ 7


 mls/hr  CONT  PRN  3/28/20 16:00


    4/8/20 15:35


7 MLS/HR


 


 Midazolam HCl 50


 mg/Sodium Chloride  50 ml @ 0


 mls/hr  CONT  PRN  3/23/20 08:15


 3/28/20 15:59 DC 3/26/20 22:39


7 MLS/HR


 


 Morphine Sulfate


  (Morphine


 Sulfate)  2 mg  PRN Q2HR  PRN  3/16/20 05:00


 3/17/20 14:15 DC 3/17/20 12:26


2 MG


 


 Multi-Ingred


 Cream/Lotion/Oil/


 Oint


  (Artificial


 Tears Eye


 Ointment)  1 thomas  PRN Q1HR  PRN  3/25/20 17:30


    4/3/20 11:05


1 THOMAS


 


 Norepinephrine


 Bitartrate 8 mg/


 Dextrose  258 ml @ 


 17.299 mls/


 hr  CONT  PRN  3/17/20 15:30


    4/8/20 13:45


20.5 MLS/HR


 


 Ondansetron HCl


  (Zofran)  4 mg  PRN Q6HRS  PRN  4/6/20 07:00


 4/7/20 06:59 DC  





 


 Pantoprazole


 Sodium


  (PROTONIX VIAL


 for IV PUSH)  40 mg  DAILYAC  3/16/20 11:30


    4/8/20 08:30


40 MG


 


 Piperacillin Sod/


 Tazobactam Sod


 4.5 gm/Sodium


 Chloride  100 ml @ 


 200 mls/hr  1X  ONCE  3/16/20 06:00


 3/16/20 06:29 DC 3/16/20 05:44


200 MLS/HR


 


 Potassium


 Chloride 15 meq/


 Bicarbonate


 Dialysis Soln w/


 out KCl  5,007.5 ml


  @ 1,000 mls/


 hr  Q5H1M  3/29/20 20:00


 4/2/20 13:08 DC 4/1/20 18:14


1,000 MLS/HR


 


 Potassium


 Chloride 20 meq/


 Bicarbonate


 Dialysis Soln w/


 out KCl  5,010 ml @ 


 1,000 mls/hr  Q5H1M  3/25/20 16:00


 3/29/20 19:59 DC 3/29/20 14:54


1,000 MLS/HR


 


 Potassium


 Chloride/Water  100 ml @ 


 100 mls/hr  Q1H  3/24/20 11:00


 3/24/20 12:59 DC 3/24/20 12:12


100 MLS/HR


 


 Potassium


 Phosphate 20 mmol/


 Sodium Chloride  106.6667


 ml @ 


 51.667 m...  1X  ONCE  3/25/20 13:00


 3/25/20 15:03 DC 3/25/20 12:51


51.667 MLS/HR


 


 Prochlorperazine


 Edisylate


  (Compazine)  5 mg  PACU PRN  PRN  4/6/20 07:00


 4/7/20 06:59 DC  





 


 Propofol  20 ml @ As


 Directed  STK-MED ONCE  4/6/20 11:07


 4/6/20 11:07 DC  





 


 Ringer's Solution  1,000 ml @ 


 30 mls/hr  Q24H  4/6/20 07:00


 4/6/20 18:59 DC  





 


 Sevoflurane


  (Ultane)  60 ml  STK-MED ONCE  4/6/20 12:46


 4/6/20 12:46 DC  





 


 Sodium


 Bicarbonate 50


 meq/Sodium


 Chloride  1,050 ml @ 


 75 mls/hr  Q14H  3/18/20 07:30


 3/23/20 10:28 DC 3/22/20 21:10


75 MLS/HR


 


 Sodium Chloride  1,000 ml @ 


 400 mls/hr  Q2H30M PRN  4/9/20 07:58


 4/9/20 19:57   





 


 Sodium Chloride


  (Normal Saline


 Flush)  10 ml  1X PRN  PRN  4/3/20 07:30


 4/4/20 07:29 DC  





 


 Sodium Chloride


 90 meq/Calcium


 Gluconate 10 meq/


 Multivitamins 10


 ml/Chromium/


 Copper/Manganese/


 Seleni/Zn 0.5 ml/


 Total Parenteral


 Nutrition/Amino


 Acids/Dextrose/


 Fat Emulsion


 Intravenous  1,512 ml @ 


 63 mls/hr  TPN  CONT  3/18/20 22:00


 3/19/20 21:59 DC 3/18/20 22:06


63 MLS/HR


 


 Sodium Chloride


 90 meq/Calcium


 Gluconate 10 meq/


 Multivitamins 10


 ml/Chromium/


 Copper/Manganese/


 Seleni/Zn 1 ml/


 Total Parenteral


 Nutrition/Amino


 Acids/Dextrose/


 Fat Emulsion


 Intravenous  55.005 ml


  @ 2.292


 mls/hr  TPN  CONT  3/18/20 22:00


 3/18/20 12:33 DC  





 


 Sodium Chloride


 90 meq/Magnesium


 Sulfate 10 meq/


 Calcium Gluconate


 20 meq/


 Multivitamins 10


 ml/Chromium/


 Copper/Manganese/


 Seleni/Zn 0.5 ml/


 Total Parenteral


 Nutrition/Amino


 Acids/Dextrose/


 Fat Emulsion


 Intravenous  1,512 ml @ 


 63 mls/hr  TPN  CONT  3/19/20 22:00


 3/20/20 21:59 DC 3/19/20 22:25


63 MLS/HR


 


 Sodium Chloride


 90 meq/Magnesium


 Sulfate 12 meq/


 Calcium Gluconate


 15 meq/


 Multivitamins 10


 ml/Chromium/


 Copper/Manganese/


 Seleni/Zn 0.5 ml/


 Insulin Human


 Regular 25 unit/


 Total Parenteral


 Nutrition/Amino


 Acids/Dextrose/


 Fat Emulsion


 Intravenous  1,400 ml @ 


 58.333 mls/


 hr  TPN  CONT  4/8/20 22:00


 4/9/20 21:59  4/8/20 21:41


58.333 MLS/HR


 


 Sodium Chloride


 90 meq/Potassium


 Chloride 15 meq/


 Magnesium Sulfate


 12 meq/Calcium


 Gluconate 15 meq/


 Multivitamins 10


 ml/Chromium/


 Copper/Manganese/


 Seleni/Zn 0.5 ml/


 Insulin Human


 Regular 25 unit/


 Total Parenteral


 Nutrition/Amino


 Acids/Dextrose/


 Fat Emulsion


 Intravenous  1,400 ml @ 


 58.333 mls/


 hr  TPN  CONT  4/7/20 22:00


 4/8/20 21:59 DC 4/7/20 22:13


58.333 MLS/HR


 


 Sodium Chloride


 90 meq/Potassium


 Chloride 15 meq/


 Potassium


 Phosphate 10 mmol/


 Magnesium Sulfate


 8 meq/Calcium


 Gluconate 15 meq/


 Multivitamins 10


 ml/Chromium/


 Copper/Manganese/


 Seleni/Zn 0.5 ml/


 Insulin Human


 Regular 25 unit/


 Total Parenteral


 Nutrition/Amino


 Acids/Dextrose/


 Fat Emulsion


 Intravenous  1,400 ml @ 


 58.333 mls/


 hr  TPN  CONT  4/5/20 22:00


 4/6/20 21:59 DC 4/5/20 21:20


58.333 MLS/HR


 


 Sodium Chloride


 90 meq/Potassium


 Chloride 15 meq/


 Potassium


 Phosphate 10 mmol/


 Magnesium Sulfate


 10 meq/Calcium


 Gluconate 20 meq/


 Multivitamins 10


 ml/Chromium/


 Copper/Manganese/


 Seleni/Zn 0.5 ml/


 Total Parenteral


 Nutrition/Amino


 Acids/Dextrose/


 Fat Emulsion


 Intravenous  1,400 ml @ 


 58.333 mls/


 hr  TPN  CONT  3/23/20 22:00


 3/24/20 21:59 DC 3/23/20 21:42


58.333 MLS/HR


 


 Sodium Chloride


 90 meq/Potassium


 Chloride 15 meq/


 Potassium


 Phosphate 10 mmol/


 Magnesium Sulfate


 12 meq/Calcium


 Gluconate 15 meq/


 Multivitamins 10


 ml/Chromium/


 Copper/Manganese/


 Seleni/Zn 0.5 ml/


 Insulin Human


 Regular 25 unit/


 Total Parenteral


 Nutrition/Amino


 Acids/Dextrose/


 Fat Emulsion


 Intravenous  1,400 ml @ 


 58.333 mls/


 hr  TPN  CONT  4/6/20 22:00


 4/7/20 21:59 DC 4/6/20 22:24


58.333 MLS/HR


 


 Sodium Chloride


 90 meq/Potassium


 Chloride 15 meq/


 Potassium


 Phosphate 15 mmol/


 Magnesium Sulfate


 10 meq/Calcium


 Gluconate 15 meq/


 Multivitamins 10


 ml/Chromium/


 Copper/Manganese/


 Seleni/Zn 0.5 ml/


 Total Parenteral


 Nutrition/Amino


 Acids/Dextrose/


 Fat Emulsion


 Intravenous  1,400 ml @ 


 58.333 mls/


 hr  TPN  CONT  3/24/20 22:00


 3/25/20 21:59 DC 3/24/20 22:17


58.333 MLS/HR


 


 Sodium Chloride


 90 meq/Potassium


 Chloride 15 meq/


 Potassium


 Phosphate 15 mmol/


 Magnesium Sulfate


 10 meq/Calcium


 Gluconate 20 meq/


 Multivitamins 10


 ml/Chromium/


 Copper/Manganese/


 Seleni/Zn 0.5 ml/


 Total Parenteral


 Nutrition/Amino


 Acids/Dextrose/


 Fat Emulsion


 Intravenous  1,200 ml @ 


 50 mls/hr  TPN  CONT  3/22/20 22:00


 3/22/20 14:17 DC  





 


 Sodium Chloride


 90 meq/Potassium


 Chloride 15 meq/


 Potassium


 Phosphate 18 mmol/


 Magnesium Sulfate


 8 meq/Calcium


 Gluconate 15 meq/


 Multivitamins 10


 ml/Chromium/


 Copper/Manganese/


 Seleni/Zn 0.5 ml/


 Insulin Human


 Regular 10 unit/


 Total Parenteral


 Nutrition/Amino


 Acids/Dextrose/


 Fat Emulsion


 Intravenous  1,400 ml @ 


 58.333 mls/


 hr  TPN  CONT  3/27/20 22:00


 3/28/20 21:59 DC 3/27/20 21:43


58.333 MLS/HR


 


 Sodium Chloride


 90 meq/Potassium


 Chloride 15 meq/


 Potassium


 Phosphate 18 mmol/


 Magnesium Sulfate


 8 meq/Calcium


 Gluconate 15 meq/


 Multivitamins 10


 ml/Chromium/


 Copper/Manganese/


 Seleni/Zn 0.5 ml/


 Insulin Human


 Regular 15 unit/


 Total Parenteral


 Nutrition/Amino


 Acids/Dextrose/


 Fat Emulsion


 Intravenous  1,400 ml @ 


 58.333 mls/


 hr  TPN  CONT  3/30/20 22:00


 3/31/20 21:59 DC 3/30/20 21:47


58.333 MLS/HR


 


 Sodium Chloride


 90 meq/Potassium


 Chloride 15 meq/


 Potassium


 Phosphate 18 mmol/


 Magnesium Sulfate


 8 meq/Calcium


 Gluconate 15 meq/


 Multivitamins 10


 ml/Chromium/


 Copper/Manganese/


 Seleni/Zn 0.5 ml/


 Insulin Human


 Regular 20 unit/


 Total Parenteral


 Nutrition/Amino


 Acids/Dextrose/


 Fat Emulsion


 Intravenous  1,400 ml @ 


 58.333 mls/


 hr  TPN  CONT  4/2/20 22:00


 4/3/20 21:59 DC 4/2/20 22:45


58.333 MLS/HR


 


 Sodium Chloride


 90 meq/Potassium


 Chloride 15 meq/


 Potassium


 Phosphate 18 mmol/


 Magnesium Sulfate


 8 meq/Calcium


 Gluconate 15 meq/


 Multivitamins 10


 ml/Chromium/


 Copper/Manganese/


 Seleni/Zn 0.5 ml/


 Total Parenteral


 Nutrition/Amino


 Acids/Dextrose/


 Fat Emulsion


 Intravenous  1,400 ml @ 


 58.333 mls/


 hr  TPN  CONT  3/26/20 22:00


 3/27/20 21:59 DC 3/26/20 22:00


58.333 MLS/HR


 


 Succinylcholine


 Chloride


  (Anectine)  120 mg  1X  ONCE  3/23/20 08:30


 3/23/20 08:31 DC 3/23/20 08:34


120 MG











Labs:


Lab





Laboratory Tests








Test


 4/8/20


13:22 4/8/20


17:45 4/9/20


00:03 4/9/20


06:15


 


Glucose (Fingerstick)


 153 mg/dL


(70-99) 197 mg/dL


(70-99) 206 mg/dL


(70-99) 203 mg/dL


(70-99)


 


White Blood Count


 


 


 


 12.0 x10^3/uL


(4.0-11.0)


 


Red Blood Count


 


 


 


 2.30 x10^6/uL


(3.50-5.40)


 


Hemoglobin


 


 


 


 7.4 g/dL


(12.0-15.5)


 


Hematocrit


 


 


 


 22.9 %


(36.0-47.0)


 


Mean Corpuscular Volume


 


 


 


 100 fL


()


 


Mean Corpuscular Hemoglobin    32 pg (25-35) 


 


Mean Corpuscular Hemoglobin


Concent 


 


 


 32 g/dL


(31-37)


 


Red Cell Distribution Width


 


 


 


 20.1 %


(11.5-14.5)


 


Platelet Count


 


 


 


 328 x10^3/uL


(140-400)


 


Sodium Level


 


 


 


 137 mmol/L


(136-145)


 


Potassium Level


 


 


 


 3.7 mmol/L


(3.5-5.1)


 


Chloride Level


 


 


 


 99 mmol/L


()


 


Carbon Dioxide Level


 


 


 


 29 mmol/L


(21-32)


 


Anion Gap    9 (6-14) 


 


Blood Urea Nitrogen


 


 


 


 51 mg/dL


(7-20)


 


Creatinine


 


 


 


 2.2 mg/dL


(0.6-1.0)


 


Estimated GFR


(Cockcroft-Gault) 


 


 


 23.7 





 


Glucose Level


 


 


 


 211 mg/dL


(70-99)


 


Calcium Level


 


 


 


 8.7 mg/dL


(8.5-10.1)


 


Phosphorus Level


 


 


 


 3.6 mg/dL


(2.6-4.7)


 


Magnesium Level


 


 


 


 2.1 mg/dL


(1.8-2.4)


 


Albumin


 


 


 


 2.6 g/dL


(3.4-5.0)


 


Triglycerides Level


 


 


 


 436 mg/dL


(0-150)


 


Test


 4/9/20


08:00 


 


 





 


O2 Saturation 92 % (92-99)    


 


Arterial Blood pH


 7.40


(7.35-7.45) 


 


 





 


Arterial Blood pCO2 at


Patient Temp 45 mmHg


(35-46) 


 


 





 


Arterial Blood pO2 at Patient


Temp 69 mmHg


() 


 


 





 


Arterial Blood HCO3


 27 mmol/L


(21-28) 


 


 





 


Arterial Blood Base Excess


 2 mmol/L


(-3-3) 


 


 





 


FiO2 35    











Objective:


Assessment:


 


Acute pancreatitis with early necrosis


Cholelithiasis


Leucocytosis improving


JED,Hyperkalemia, Metabolic acidosis on HD


Acute hypoxic resp failure ,bilateral pleural effusion


hypocalcemia 


Prediabetes


HTN





Plan:


Plan of Care


 cont merrem 4/8


cont Daptomycin (3/25),  cont micafungin


was on cefepime and flagyl


f/u cults 


Maintain aspiration precautions 


COVID-19 neg, 3/26  


C diff negative 3/23


 Lt IJ DC'd


 


D/w nursing





Critically ill











IAVN FRANZ MD            Apr 9, 2020 11:22

## 2020-04-09 NOTE — PDOC
PROGRESS NOTES


Chief Complaint


Chief Complaint


Respiratory failure requiring mechanical ventilation


bilateral pleural effusions/pulm edema 


Sepsis


Severe Acute gallstone pancreatitis (not a surgical candidate at this time) with

necrosis


Acute kidney failure now requiring dialysis


Salpingitis


Gallstones (Calculus of gallbladder with acute cholecystitis without 

obstruction)


HTN 


Leukocytosis 


Hypoxia


Uterine fibroid


Intractable pain


Intractable nausea


Covid 19 negative. 


Acute on chronic anemia 


EEG: No seizure activity.





Plan:


continue supportive measures


dialysis today


continue meropenam, dapto, capsofungin


follow cultures


continue levophed support as needed 


seizures, neuro following


awaiting for placement, she will probably will need to transition to LTAC





History of Present Illness


History of Present Illness


4/9/20: patient seen in ICU tolerating dialysis. sedated on vent. no acute 

issues overnight. no fever today. 





4/7/2020


No acute events reported overnight, case discussed with nursing staff patient in

no acute distress no complaints during my visit, most likely patient will be 

moving to LTAC soon





4/6/2020


Plans for trach int he am and dialysis in the afternoon, no acute events 

reported overnight. 





4/5/2020


No acute events reported overnight, case discussed with nursing staff patient in

no acute distress during my visit





4/4/2020


No acute events reported overnight, patient receiving dialysis today, case 

discussed with nursing staff patient in no acute distress during my visit








4/3/2020


No new changes remains critically ill, off CRRT, still planning for trach on 

Monday unless we manage to wean over the weekend





4/2/2020


Continues to remain critically ill.  The patient unable to be taken off 

ventilatory support as of yet.,  Discussed with pulmonary consultant.  Planning 

all tracheostomy on Monday if he is unable to be weaned off vent





4/1/2020


No acute events reported overnight, no fever or chills, remains critically 

stable, discussed with mother at bedside. 





[id number]


Patient seen and examined in the ICU


She is critically ill


Mechanically ventilated


Assist-control/25/450/30 with 8 of PEEP


She is on cefepime daptomycin Flagyl and micafungin GEN for antibiotic coverage


Also getting TPN and CRRT


Sedated with Versed


Getting IV albumin also


She is tachycardic at 112 bpm


Temp max 100.0


White blood count is down from 45,000 35,000 today


Chart reviewed


Discussed with RN











[id number]


Patient seen and examined in the ICU


She remains very critically ill


Going for a CT of the abdomen chest and pelvis


Ventilated : On assist control 40% FiO2


Discussed with RN


Chart reviewed








[id number]


Patient seen and examined in the ICU


She is still on CRRT although we plan to change to hemodialysis soon


Chart reviewed


Discussed with RN


Hemoglobin down to 6.9 (suspect CRRT related , Will let nephrology consider 

transfusion while on dialysis)








[id number]


Patient seen and examined in the ICU


She is mechanically ventilated


Before meals/25/450/30%


Also on CRRT


Very critically ill


Chart reviewed


Discussed with RN











[id number]


Patient seen and examined in the ICU


She is now been transferred to the Covid 19 unit so we can rule out Covid and 

keep her in isolation


Very critically ill


Intubated and  ventilated


Assist control 40%


Chart reviewed


Discussed with RN


Still on CRRT


Getting a chest x-ray now


Very concerned about her prognosis








[id number]


Patient seen and examined in the ICU


She is extremely critically ill


Remains mechanically ventilated with assist control/25/450/40%


Also on continuous renal replacement therapy


Chart reviewed


Discussed with RN


She has TPN hanging


Also on pressors











[id number]


Patient seen and examined in the ICU


She is still requiring CRRT


On the vent with assist control but her FiO2 is down to 40% from yesterday


Slightly better but still very critically ill


Discussed with RN


Chart reviewed








[id number]


Patient seen and examined in the ICU


She remains extremely critically ill


On IV fentanyl IV Versed IV Doxy


On the vent


Assist-control/25/450/70%


She is critically ill


Discussed with RN


Chart reviewed


Patient is currently on CRRT as well








[id number]


Patient seen and examined in the ICU


She had to be intubated this morning


On assist-control 25/450/100% with 10 of PEEP and only satting 87%


She is extremely critically ill


I'm not sure if she will survive


Chart reviewed


Discussed with RN











Ms Tadeo is a 50yo F w/ PMHx HTN, prediabetes who presents the emergency room

complaints of abdominal pain. Patient described off and on 3 days. She states is

constant, described as a squeezing sensation in a band-like distribution. + 

nausea, vomiting.  She denies any fever or diarrhea.  Patient denies any 

abdominal surgical procedures.  She states is worse with movements, car ride.  

Pain initially was upper abdomen however now pretty much generalized.  Last 

bowel movement was 3/15/2020. Nothing makes her pain better.  Patient denies any

shortness of breath.  She does state the pain moves into her chest.  Denies any 

headache or visual changes.


Lipase 69891, , , Bilirubin 1.4.


CT abdomen confirms pancreatic inflammation, peripancreatic fluid and 

inflammatory changes around the pancreas consistent with pancreatitis. 

Cholelithiasis and 1.4cm uterine fibroid as well as possible left salpingitis. 

Admitted for further care


GI, General surgery, ID, Pulm consulted.





3/17: Overnight per report no urine output. Added dilaudid for pain, PICC placed

per IR. Renal US negative.Seen bedside in ICU, given 2L additional NSS and 

albumin infusion. Still hypotensive, started on levophed. Repeat CT abdomen with

necrosis. Updated her fiancee


3/18: Sats are only 87% on nasal cannula oxygen. Dialysis catheter per 

nephrology


3/19: She is now on BiPAP appears more ill, now on dialysis


3/20: Seen on BiPAP. Her mother and another family member are present and seemed

to be good support for her. Currently on dialysis. Appears critically ill


3/21: Overnight Tmax 101.7 , still on BiPAP FiO2 40%, still on low dose Levophed

gtt, TPN initiated. On dialysis





Overnight still febrile. Dialysis today. She wakes up and responds to pain. No 

CP.





Vitals


Vitals





Vital Signs








  Date Time  Temp Pulse Resp B/P (MAP) Pulse Ox O2 Delivery O2 Flow Rate FiO2


 


4/9/20 16:00  96 17 124/66 (85) 99 Ventilator  


 


4/9/20 14:30 99.4       





 99.4       











Physical Exam


Physical Exam


GENERAL: Orally intubated/sedated - generalized anasarca 


HEENT: Pupils equal, ETT, dobhoff +


NECK:  Supple 


LUNGS: Diminished aeration bases 


HEART:  S1, S2, regular 


ABDOMEN:  Distended, hypoactive BS, Rectal tube in place 


: [place]   


EXTREMITIES: Generalized edema, no cyanosis - some mottling, Ro[place] boots & SCDs 

bilaterally 


DERMATOLOGIC:  Warm and dry.  No generalized rash.  


CENTRAL NERVOUS SYSTEM: Sedated 


RIJ Temp HDC, RUE-PICC  


Lt IJ removed 4/7


Chestwall line present


General:  No acute distress


Heart:  Other (increased rate)


Lungs:  Other (dimished in BLL)


Abdomen:  Other (distended, firm)


Extremities:  No edema, Other (SOME CLUBBING )


Skin:  Other (mottling noted to extremities )





Labs


LABS





Laboratory Tests








Test


 4/8/20


17:45 4/9/20


00:03 4/9/20


06:15 4/9/20


08:00


 


Glucose (Fingerstick)


 197 mg/dL


(70-99) 206 mg/dL


(70-99) 203 mg/dL


(70-99) 





 


White Blood Count


 


 


 12.0 x10^3/uL


(4.0-11.0) 





 


Red Blood Count


 


 


 2.30 x10^6/uL


(3.50-5.40) 





 


Hemoglobin


 


 


 7.4 g/dL


(12.0-15.5) 





 


Hematocrit


 


 


 22.9 %


(36.0-47.0) 





 


Mean Corpuscular Volume


 


 


 100 fL


() 





 


Mean Corpuscular Hemoglobin   32 pg (25-35)  


 


Mean Corpuscular Hemoglobin


Concent 


 


 32 g/dL


(31-37) 





 


Red Cell Distribution Width


 


 


 20.1 %


(11.5-14.5) 





 


Platelet Count


 


 


 328 x10^3/uL


(140-400) 





 


Sodium Level


 


 


 137 mmol/L


(136-145) 





 


Potassium Level


 


 


 3.7 mmol/L


(3.5-5.1) 





 


Chloride Level


 


 


 99 mmol/L


() 





 


Carbon Dioxide Level


 


 


 29 mmol/L


(21-32) 





 


Anion Gap   9 (6-14)  


 


Blood Urea Nitrogen


 


 


 51 mg/dL


(7-20) 





 


Creatinine


 


 


 2.2 mg/dL


(0.6-1.0) 





 


Estimated GFR


(Cockcroft-Gault) 


 


 23.7 


 





 


Glucose Level


 


 


 211 mg/dL


(70-99) 





 


Calcium Level


 


 


 8.7 mg/dL


(8.5-10.1) 





 


Phosphorus Level


 


 


 3.6 mg/dL


(2.6-4.7) 





 


Magnesium Level


 


 


 2.1 mg/dL


(1.8-2.4) 





 


Albumin


 


 


 2.6 g/dL


(3.4-5.0) 





 


Triglycerides Level


 


 


 436 mg/dL


(0-150) 





 


O2 Saturation    92 % (92-99) 


 


Arterial Blood pH


 


 


 


 7.40


(7.35-7.45)


 


Arterial Blood pCO2 at


Patient Temp 


 


 


 45 mmHg


(35-46)


 


Arterial Blood pO2 at Patient


Temp 


 


 


 69 mmHg


()


 


Arterial Blood HCO3


 


 


 


 27 mmol/L


(21-28)


 


Arterial Blood Base Excess


 


 


 


 2 mmol/L


(-3-3)


 


FiO2    35 


 


Test


 4/9/20


14:50 


 


 





 


Glucose (Fingerstick)


 157 mg/dL


(70-99) 


 


 














Assessment and Plan


Assessmemt and Plan


Problems


Medical Problems:


(1) Acute pancreatitis


Status: Acute  





(2) Cholelithiasis


Status: Acute  











Comment


Review of Relevant


I have reviewed the following items josy (where applicable) has been applied.


Labs





Laboratory Tests








Test


 4/7/20


18:03 4/7/20


23:40 4/8/20


06:10 4/8/20


06:17


 


Glucose (Fingerstick)


 134 mg/dL


(70-99) 167 mg/dL


(70-99) 


 182 mg/dL


(70-99)


 


Sodium Level


 


 


 135 mmol/L


(136-145) 





 


Potassium Level


 


 


 4.9 mmol/L


(3.5-5.1) 





 


Chloride Level


 


 


 102 mmol/L


() 





 


Carbon Dioxide Level


 


 


 24 mmol/L


(21-32) 





 


Anion Gap   9 (6-14)  


 


Blood Urea Nitrogen


 


 


 61 mg/dL


(7-20) 





 


Creatinine


 


 


 2.0 mg/dL


(0.6-1.0) 





 


Estimated GFR


(Cockcroft-Gault) 


 


 26.5 


 





 


Glucose Level


 


 


 187 mg/dL


(70-99) 





 


Calcium Level


 


 


 8.7 mg/dL


(8.5-10.1) 





 


Phosphorus Level


 


 


 4.8 mg/dL


(2.6-4.7) 





 


Magnesium Level


 


 


 1.9 mg/dL


(1.8-2.4) 





 


Albumin


 


 


 2.3 g/dL


(3.4-5.0) 





 


Test


 4/8/20


08:25 4/8/20


13:22 4/8/20


17:45 4/9/20


00:03


 


O2 Saturation 99 % (92-99)    


 


Arterial Blood pH


 7.34


(7.35-7.45) 


 


 





 


Arterial Blood pCO2 at


Patient Temp 36 mmHg


(35-46) 


 


 





 


Arterial Blood pO2 at Patient


Temp 242 mmHg


() 


 


 





 


Arterial Blood HCO3


 19 mmol/L


(21-28) 


 


 





 


Arterial Blood Base Excess


 -6 mmol/L


(-3-3) 


 


 





 


FiO2 40    


 


Glucose (Fingerstick)


 


 153 mg/dL


(70-99) 197 mg/dL


(70-99) 206 mg/dL


(70-99)


 


Test


 4/9/20


06:15 4/9/20


08:00 4/9/20


14:50 





 


White Blood Count


 12.0 x10^3/uL


(4.0-11.0) 


 


 





 


Red Blood Count


 2.30 x10^6/uL


(3.50-5.40) 


 


 





 


Hemoglobin


 7.4 g/dL


(12.0-15.5) 


 


 





 


Hematocrit


 22.9 %


(36.0-47.0) 


 


 





 


Mean Corpuscular Volume


 100 fL


() 


 


 





 


Mean Corpuscular Hemoglobin 32 pg (25-35)    


 


Mean Corpuscular Hemoglobin


Concent 32 g/dL


(31-37) 


 


 





 


Red Cell Distribution Width


 20.1 %


(11.5-14.5) 


 


 





 


Platelet Count


 328 x10^3/uL


(140-400) 


 


 





 


Sodium Level


 137 mmol/L


(136-145) 


 


 





 


Potassium Level


 3.7 mmol/L


(3.5-5.1) 


 


 





 


Chloride Level


 99 mmol/L


() 


 


 





 


Carbon Dioxide Level


 29 mmol/L


(21-32) 


 


 





 


Anion Gap 9 (6-14)    


 


Blood Urea Nitrogen


 51 mg/dL


(7-20) 


 


 





 


Creatinine


 2.2 mg/dL


(0.6-1.0) 


 


 





 


Estimated GFR


(Cockcroft-Gault) 23.7 


 


 


 





 


Glucose Level


 211 mg/dL


(70-99) 


 


 





 


Glucose (Fingerstick)


 203 mg/dL


(70-99) 


 157 mg/dL


(70-99) 





 


Calcium Level


 8.7 mg/dL


(8.5-10.1) 


 


 





 


Phosphorus Level


 3.6 mg/dL


(2.6-4.7) 


 


 





 


Magnesium Level


 2.1 mg/dL


(1.8-2.4) 


 


 





 


Albumin


 2.6 g/dL


(3.4-5.0) 


 


 





 


Triglycerides Level


 436 mg/dL


(0-150) 


 


 





 


O2 Saturation  92 % (92-99)   


 


Arterial Blood pH


 


 7.40


(7.35-7.45) 


 





 


Arterial Blood pCO2 at


Patient Temp 


 45 mmHg


(35-46) 


 





 


Arterial Blood pO2 at Patient


Temp 


 69 mmHg


() 


 





 


Arterial Blood HCO3


 


 27 mmol/L


(21-28) 


 





 


Arterial Blood Base Excess


 


 2 mmol/L


(-3-3) 


 





 


FiO2  35   








Laboratory Tests








Test


 4/8/20


17:45 4/9/20


00:03 4/9/20


06:15 4/9/20


08:00


 


Glucose (Fingerstick)


 197 mg/dL


(70-99) 206 mg/dL


(70-99) 203 mg/dL


(70-99) 





 


White Blood Count


 


 


 12.0 x10^3/uL


(4.0-11.0) 





 


Red Blood Count


 


 


 2.30 x10^6/uL


(3.50-5.40) 





 


Hemoglobin


 


 


 7.4 g/dL


(12.0-15.5) 





 


Hematocrit


 


 


 22.9 %


(36.0-47.0) 





 


Mean Corpuscular Volume


 


 


 100 fL


() 





 


Mean Corpuscular Hemoglobin   32 pg (25-35)  


 


Mean Corpuscular Hemoglobin


Concent 


 


 32 g/dL


(31-37) 





 


Red Cell Distribution Width


 


 


 20.1 %


(11.5-14.5) 





 


Platelet Count


 


 


 328 x10^3/uL


(140-400) 





 


Sodium Level


 


 


 137 mmol/L


(136-145) 





 


Potassium Level


 


 


 3.7 mmol/L


(3.5-5.1) 





 


Chloride Level


 


 


 99 mmol/L


() 





 


Carbon Dioxide Level


 


 


 29 mmol/L


(21-32) 





 


Anion Gap   9 (6-14)  


 


Blood Urea Nitrogen


 


 


 51 mg/dL


(7-20) 





 


Creatinine


 


 


 2.2 mg/dL


(0.6-1.0) 





 


Estimated GFR


(Cockcroft-Gault) 


 


 23.7 


 





 


Glucose Level


 


 


 211 mg/dL


(70-99) 





 


Calcium Level


 


 


 8.7 mg/dL


(8.5-10.1) 





 


Phosphorus Level


 


 


 3.6 mg/dL


(2.6-4.7) 





 


Magnesium Level


 


 


 2.1 mg/dL


(1.8-2.4) 





 


Albumin


 


 


 2.6 g/dL


(3.4-5.0) 





 


Triglycerides Level


 


 


 436 mg/dL


(0-150) 





 


O2 Saturation    92 % (92-99) 


 


Arterial Blood pH


 


 


 


 7.40


(7.35-7.45)


 


Arterial Blood pCO2 at


Patient Temp 


 


 


 45 mmHg


(35-46)


 


Arterial Blood pO2 at Patient


Temp 


 


 


 69 mmHg


()


 


Arterial Blood HCO3


 


 


 


 27 mmol/L


(21-28)


 


Arterial Blood Base Excess


 


 


 


 2 mmol/L


(-3-3)


 


FiO2    35 


 


Test


 4/9/20


14:50 


 


 





 


Glucose (Fingerstick)


 157 mg/dL


(70-99) 


 


 











Microbiology


4/5/20 Blood Culture - Preliminary, Resulted


         NO GROWTH AFTER 4 DAYS


4/4/20 Urine Culture - Final, Complete


         


4/4/20 Urine Culture Result 1 ([facility]) - Final, Complete


Medications





Current Medications


Sodium Chloride 1,000 ml @  1,000 mls/hr Q1H IV  Last administered on 3/16/20at 

03:00;  Start 3/16/20 at 03:00;  Stop 3/16/20 at 03:59;  Status DC


Ondansetron HCl (Zofran) 4 mg 1X  ONCE IVP  Last administered on 3/16/20at 

03:27;  Start 3/16/20 at 03:00;  Stop 3/16/20 at 03:01;  Status DC


Morphine Sulfate (Morphine Sulfate) 4 mg 1X  ONCE IV ;  Start 3/16/20 at 03:00; 

Stop 3/16/20 at 03:01;  Status Cancel


Ketorolac Tromethamine (Toradol 30mg Vial) 30 mg 1X  ONCE IV  Last administered 

on 3/16/20at 02:54;  Start 3/16/20 at 03:00;  Stop 3/16/20 at 03:01;  Status DC


Fentanyl Citrate (Fentanyl 2ml Vial) 25 mcg 1X  ONCE IVP  Last administered on 

3/16/20at 03:23;  Start 3/16/20 at 03:30;  Stop 3/16/20 at 03:31;  Status DC


Fentanyl Citrate (Fentanyl 2ml Vial) 100 mcg STK-MED ONCE .ROUTE ;  Start 

3/16/20 at 03:18;  Stop 3/16/20 at 03:18;  Status DC


Iohexol (Omnipaque 350 Mg/ml) 90 ml 1X  ONCE IV  Last administered on 3/16/20at 

03:25;  Start 3/16/20 at 03:30;  Stop 3/16/20 at 03:31;  Status DC


Info (CONTRAST GIVEN -- Rx MONITORING) 1 each PRN DAILY  PRN MC SEE COMMENTS;  

Start 3/16/20 at 03:30;  Stop 3/18/20 at 03:29;  Status DC


Hydromorphone HCl (Dilaudid) 0.5 mg 1X  ONCE IV  Last administered on 3/16/20at 

03:55;  Start 3/16/20 at 04:30;  Stop 3/16/20 at 04:32;  Status DC


Ondansetron HCl (Zofran) 4 mg PRN Q8HRS  PRN IV NAUSEA/VOMITING 1ST CHOICE;  

Start 3/16/20 at 05:00;  Stop 3/16/20 at 09:27;  Status DC


Morphine Sulfate (Morphine Sulfate) 2 mg PRN Q2HR  PRN IV SEVERE PAIN 7-10 Last 

administered on 3/17/20at 12:26;  Start 3/16/20 at 05:00;  Stop 3/17/20 at 

14:15;  Status DC


Sodium Chloride 1,000 ml @  125 mls/hr Q8H IV  Last administered on 3/16/20at 

20:56;  Start 3/16/20 at 05:00;  Stop 3/17/20 at 04:59;  Status DC


Hydromorphone HCl (Dilaudid) 0.5 mg PRN Q3HRS  PRN IV SEVERE PAIN 7-10 Last 

administered on 3/17/20at 10:06;  Start 3/16/20 at 05:00;  Stop 3/17/20 at 

12:01;  Status DC


Piperacillin Sod/ Tazobactam Sod 4.5 gm/Sodium Chloride 100 ml @  200 mls/hr 1X 

ONCE IV  Last administered on 3/16/20at 05:44;  Start 3/16/20 at 06:00;  Stop 

3/16/20 at 06:29;  Status DC


Ondansetron HCl (Zofran) 4 mg PRN Q4HRS  PRN IV NAUSEA/VOMITING 1ST CHOICE Last 

administered on 3/21/20at 16:15;  Start 3/16/20 at 09:30


Insulin Human Lispro (HumaLOG) 0-9 UNITS Q6HRS SQ  Last administered on 4/9/20at

06:25;  Start 3/16/20 at 09:30


Dextrose (Dextrose 50%-Water Syringe) 12.5 gm PRN Q15MIN  PRN IV SEE COMMENTS;  

Start 3/16/20 at 09:30


Pantoprazole Sodium (PROTONIX VIAL for IV PUSH) 40 mg DAILYAC IVP  Last 

administered on 4/9/20at 14:51;  Start 3/16/20 at 11:30


Prochlorperazine Edisylate (Compazine) 10 mg PRN Q6HRS  PRN IV NAUSEA/VOMITING, 

2nd CHOICE Last administered on 3/17/20at 00:42;  Start 3/16/20 at 17:45


Atenolol (Tenormin) 100 mg DAILY PO ;  Start 3/17/20 at 09:00;  Stop 3/16/20 at 

20:08;  Status DC


Metoprolol Tartrate (Lopressor Vial) 2.5 mg Q6HRS IVP  Last administered on 

3/17/20at 05:51;  Start 3/16/20 at 20:15;  Stop 3/17/20 at 10:02;  Status DC


Metoprolol Tartrate (Lopressor Vial) 5 mg Q6HRS IVP  Last administered on 

3/26/20at 00:12;  Start 3/17/20 at 10:15;  Stop 3/28/20 at 08:48;  Status DC


Hydromorphone HCl (Dilaudid) 1 mg PRN Q3HRS  PRN IV SEVERE PAIN 7-10 Last 

administered on 3/23/20at 05:13;  Start 3/17/20 at 12:00;  Stop 3/31/20 at 

00:25;  Status DC


Lidocaine HCl (Buffered Lidocaine 1%) 3 ml STK-MED ONCE .ROUTE ;  Start 3/17/20 

at 12:55;  Stop 3/17/20 at 12:56;  Status DC


Albumin Human 500 ml @  125 mls/hr 1X  ONCE IV  Last administered on 3/17/20at 

14:33;  Start 3/17/20 at 14:30;  Stop 3/17/20 at 18:32;  Status DC


Norepinephrine Bitartrate 8 mg/ Dextrose 258 ml @  17.299 mls/ hr CONT  PRN IV 

PER PROTOCOL Last administered on 4/9/20at 12:39;  Start 3/17/20 at 15:30


Sodium Chloride 1,000 ml @  125 mls/hr Q8H IV  Last administered on 3/17/20at 

21:04;  Start 3/17/20 at 16:00;  Stop 3/18/20 at 02:42;  Status DC


Albumin Human 500 ml @  125 mls/hr PRN BID  PRN IV After every 2L NSS & BP < 

90mm Last administered on 4/1/20at 14:21;  Start 3/17/20 at 16:00


Iohexol (Omnipaque 300 Mg/ml) 60 ml 1X  ONCE IV  Last administered on 3/17/20at 

17:20;  Start 3/17/20 at 17:00;  Stop 3/17/20 at 17:01;  Status DC


Info (CONTRAST GIVEN -- Rx MONITORING) 1 each PRN DAILY  PRN MC SEE COMMENTS;  

Start 3/17/20 at 17:00;  Stop 3/19/20 at 16:59;  Status DC


Meropenem 1 gm/ Sodium Chloride 100 ml @  200 mls/hr Q8HRS IV  Last administered

on 3/18/20at 05:45;  Start 3/17/20 at 20:00;  Stop 3/18/20 at 08:48;  Status DC


Furosemide (Lasix) 40 mg 1X  ONCE IVP  Last administered on 3/17/20at 22:12;  S

tart 3/17/20 at 22:30;  Stop 3/17/20 at 22:31;  Status DC


Calcium Chloride 1000 mg/Sodium Chloride 110 ml @  220 mls/hr 1X  ONCE IV  Last 

administered on 3/17/20at 22:11;  Start 3/17/20 at 22:30;  Stop 3/17/20 at 

22:59;  Status DC


Albuterol Sulfate (Ventolin Neb Soln) 2.5 mg 1X  ONCE NEB  Last administered on 

3/18/20at 00:56;  Start 3/17/20 at 22:30;  Stop 3/17/20 at 22:31;  Status DC


Insulin Human Regular (HumuLIN R VIAL) 5 unit 1X  ONCE IV  Last administered on 

3/17/20at 22:14;  Start 3/17/20 at 22:30;  Stop 3/17/20 at 22:31;  Status DC


Magnesium Sulfate 50 ml @ 25 mls/hr 1X  ONCE IV  Last administered on 3/18/20at 

02:57;  Start 3/18/20 at 03:00;  Stop 3/18/20 at 04:59;  Status DC


Calcium Gluconate 1000 mg/Sodium Chloride 110 ml @  220 mls/hr 1X  ONCE IV  Last

administered on 3/18/20at 02:46;  Start 3/18/20 at 03:00;  Stop 3/18/20 at 

03:29;  Status DC


Sodium Chloride 1,000 ml @  200 mls/hr Q5H IV  Last administered on 3/18/20at 

02:46;  Start 3/18/20 at 03:00;  Stop 3/18/20 at 10:21;  Status DC


Calcium Gluconate 1000 mg/Sodium Chloride 110 ml @  220 mls/hr 1X  ONCE IV  Last

administered on 3/18/20at 03:21;  Start 3/18/20 at 03:30;  Stop 3/18/20 at 

03:59;  Status DC


Sodium Bicarbonate 50 meq/Sodium Chloride 1,050 ml @  75 mls/hr Q14H IV  Last 

administered on 3/22/20at 21:10;  Start 3/18/20 at 07:30;  Stop 3/23/20 at 

10:28;  Status DC


Calcium Gluconate 2000 mg/Sodium Chloride 120 ml @  220 mls/hr 1X  ONCE IV  Last

administered on 3/18/20at 09:05;  Start 3/18/20 at 07:30;  Stop 3/18/20 at 

08:02;  Status DC


Lidocaine HCl (Xylocaine-Mpf 1% 2ml Vial) 2 ml STK-MED ONCE .ROUTE ;  Start 

3/18/20 at 08:47;  Stop 3/18/20 at 08:47;  Status DC


Meropenem 500 mg/ Sodium Chloride 50 ml @  100 mls/hr Q12HR IV  Last 

administered on 3/23/20at 21:01;  Start 3/18/20 at 18:00;  Stop 3/24/20 at 

07:58;  Status DC


Lidocaine HCl (Buffered Lidocaine 1%) 3 ml STK-MED ONCE .ROUTE ;  Start 3/18/20 

at 09:46;  Stop 3/18/20 at 09:46;  Status DC


Lidocaine HCl (Buffered Lidocaine 1%) 6 ml 1X  ONCE INJ  Last administered on 

3/18/20at 10:26;  Start 3/18/20 at 10:15;  Stop 3/18/20 at 10:16;  Status DC


Info (Tpn Per Pharmacy) 1 each PRN DAILY  PRN MC SEE COMMENTS Last administered 

on 4/9/20at 12:53;  Start 3/18/20 at 12:00


Sodium Chloride 1,000 ml @  1,000 mls/hr Q1H PRN IV hypotension;  Start 3/18/20 

at 12:07;  Stop 3/18/20 at 18:06;  Status DC


Diphenhydramine HCl (Benadryl) 25 mg 1X PRN  PRN IV ITCHING;  Start 3/18/20 at 

12:15;  Stop 3/19/20 at 12:14;  Status DC


Diphenhydramine HCl (Benadryl) 25 mg 1X PRN  PRN IV ITCHING;  Start 3/18/20 at 

12:15;  Stop 3/19/20 at 12:14;  Status DC


Sodium Chloride 1,000 ml @  400 mls/hr Q2H30M PRN IV PATENCY;  Start 3/18/20 at 

12:07;  Stop 3/19/20 at 00:06;  Status DC


Info (PHARMACY MONITORING -- do not chart) 1 each PRN DAILY  PRN MC SEE 

COMMENTS;  Start 3/18/20 at 12:15;  Stop 3/20/20 at 08:13;  Status DC


Sodium Chloride 90 meq/Calcium Gluconate 10 meq/ Multivitamins 10 ml/Chromium/ 

Copper/Manganese/ Seleni/Zn 1 ml/ Total Parenteral Nutrition/Amino 

Acids/Dextrose/ Fat Emulsion Intravenous 55.005 ml  @ 2.292 mls/hr TPN  CONT IV 

;  Start 3/18/20 at 22:00;  Stop 3/18/20 at 12:33;  Status DC


Info (Tpn Per Pharmacy) 1 each PRN DAILY  PRN MC SEE COMMENTS;  Start 3/18/20 at

12:30;  Status UNV


Sodium Chloride 90 meq/Calcium Gluconate 10 meq/ Multivitamins 10 ml/Chromium/ 

Copper/Manganese/ Seleni/Zn 0.5 ml/ Total Parenteral Nutrition/Amino 

Acids/Dextrose/ Fat Emulsion Intravenous 1,512 ml @  63 mls/hr TPN  CONT IV  

Last administered on 3/18/20at 22:06;  Start 3/18/20 at 22:00;  Stop 3/19/20 at 

21:59;  Status DC


Calcium Carbonate/ Glycine (Tums) 500 mg PRN AFTMEALHC  PRN PO INDIGESTION;  

Start 3/18/20 at 17:45


Calcium Gluconate (Calcium Gluconate) 2,000 mg 1X  ONCE IVP  Last administered 

on 3/19/20at 02:19;  Start 3/19/20 at 02:15;  Stop 3/19/20 at 02:16;  Status DC


Calcium Chloride 3000 mg/Sodium Chloride 1,030 ml @  50 mls/hr [id number] IV  Last 

administered on 3/21/20at 02:17;  Start 3/19/20 at 08:00;  Stop 3/21/20 at 

15:23;  Status DC


Lorazepam (Ativan Inj) 1 mg PRN Q4HRS  PRN IVP ANXIETY / AGITATION Last 

administered on 3/23/20at 00:34;  Start 3/19/20 at 09:00


Sodium Chloride 1,000 ml @  1,000 mls/hr Q1H PRN IV hypotension;  Start 3/19/20 

at 08:56;  Stop 3/19/20 at 14:55;  Status DC


Albumin Human 200 ml @  200 mls/hr 1X PRN  PRN IV Hypotension;  Start 3/19/20 at

09:00;  Stop 3/19/20 at 14:59;  Status DC


Diphenhydramine HCl (Benadryl) 25 mg 1X PRN  PRN IV ITCHING;  Start 3/19/20 at 

09:00;  Stop 3/20/20 at 08:59;  Status DC


Diphenhydramine HCl (Benadryl) 25 mg 1X PRN  PRN IV ITCHING;  Start 3/19/20 at 

09:00;  Stop 3/20/20 at 08:59;  Status DC


Sodium Chloride 1,000 ml @  400 mls/hr Q2H30M PRN IV PATENCY;  Start 3/19/20 at 

08:56;  Stop 3/19/20 at 20:55;  Status DC


Info (PHARMACY MONITORING -- do not chart) 1 each PRN DAILY  PRN MC SEE 

COMMENTS;  Start 3/19/20 at 09:00;  Status UNV


Info (PHARMACY MONITORING -- do not chart) 1 each PRN DAILY  PRN MC SEE 

COMMENTS;  Start 3/19/20 at 09:00;  Stop 3/20/20 at 08:13;  Status DC


Digoxin (Lanoxin) 500 mcg 1X  ONCE IV  Last administered on 3/19/20at 10:04;  

Start 3/19/20 at 10:00;  Stop 3/19/20 at 10:01;  Status DC


Digoxin (Lanoxin) 125 mcg 1X  ONCE IV  Last administered on 3/19/20at 17:10;  

Start 3/19/20 at 18:00;  Stop 3/19/20 at 18:01;  Status DC


Magnesium Sulfate 100 ml @  25 mls/hr 1X  ONCE IV  Last administered on 

3/19/20at 12:48;  Start 3/19/20 at 13:00;  Stop 3/19/20 at 16:59;  Status DC


Sodium Chloride 90 meq/Magnesium Sulfate 10 meq/ Calcium Gluconate 20 meq/ 

Multivitamins 10 ml/Chromium/ Copper/Manganese/ Seleni/Zn 0.5 ml/ Total 

Parenteral Nutrition/Amino Acids/Dextrose/ Fat Emulsion Intravenous 1,512 ml @  

63 mls/hr TPN  CONT IV  Last administered on 3/19/20at 22:25;  Start 3/19/20 at 

22:00;  Stop 3/20/20 at 21:59;  Status DC


Sodium Chloride 1,000 ml @  1,000 mls/hr Q1H PRN IV hypotension;  Start 3/20/20 

at 08:05;  Stop 3/20/20 at 14:04;  Status DC


Albumin Human 200 ml @  200 mls/hr 1X  ONCE IV  Last administered on 3/20/20at 

08:57;  Start 3/20/20 at 08:15;  Stop 3/20/20 at 09:14;  Status DC


Diphenhydramine HCl (Benadryl) 25 mg 1X PRN  PRN IV ITCHING;  Start 3/20/20 at 

08:15;  Stop 3/21/20 at 08:14;  Status DC


Diphenhydramine HCl (Benadryl) 25 mg 1X PRN  PRN IV ITCHING;  Start 3/20/20 at 

08:15;  Stop 3/21/20 at 08:14;  Status DC


Sodium Chloride 1,000 ml @  400 mls/hr Q2H30M PRN IV PATENCY;  Start 3/20/20 at 

08:05;  Stop 3/20/20 at 20:04;  Status DC


Info (PHARMACY MONITORING -- do not chart) 1 each PRN DAILY  PRN MC SEE 

COMMENTS;  Start 3/20/20 at 08:15;  Stop 3/24/20 at 07:57;  Status DC


Sodium Chloride 90 meq/Potassium Chloride 15 meq/ Potassium Phosphate 10 mmol/ 

Magnesium Sulfate 10 meq/Calcium Gluconate 20 meq/ Multivitamins 10 ml/Chromium/

Copper/Manganese/ Seleni/Zn 0.5 ml/ Total Parenteral Nutrition/Amino Acids/

Dextrose/ Fat Emulsion Intravenous 1,512 ml @  63 mls/hr TPN  CONT IV  Last 

administered on 3/20/20at 21:01;  Start 3/20/20 at 22:00;  Stop 3/21/20 at 

21:59;  Status DC


Potassium Chloride/Water 100 ml @  100 mls/hr 1X  ONCE IV  Last administered on 

3/20/20at 14:09;  Start 3/20/20 at 14:00;  Stop 3/20/20 at 14:59;  Status DC


Benzocaine (Hurricaine One) 1 spray 1X  ONCE MM  Last administered on 3/20/20at 

16:38;  Start 3/20/20 at 14:30;  Stop 3/20/20 at 14:31;  Status DC


Lidocaine HCl (Glydo (Lidocaine) Jelly) 1 thomas 1X  ONCE MM  Last administered on 

3/20/20at 16:38;  Start 3/20/20 at 14:30;  Stop 3/20/20 at 14:31;  Status DC


Linezolid/Dextrose 300 ml @  300 mls/hr Q12HR IV  Last administered on 3/26/20at

21:04;  Start 3/20/20 at 20:00;  Stop 3/27/20 at 07:50;  Status DC


Acetaminophen (Tylenol) 650 mg PRN Q6HRS  PRN PO MILD PAIN / TEMP;  Start 

3/21/20 at 03:30;  Stop 3/21/20 at 03:36;  Status DC


Acetaminophen (Tylenol) 650 mg PRN Q6HRS  PRN PEG MILD PAIN / TEMP Last 

administered on 3/26/20at 07:35;  Start 3/21/20 at 03:36


Sodium Chloride 1,000 ml @  1,000 mls/hr Q1H PRN IV hypotension;  Start 3/21/20 

at 07:50;  Stop 3/21/20 at 13:49;  Status DC


Albumin Human 200 ml @  200 mls/hr 1X PRN  PRN IV Hypotension;  Start 3/21/20 at

08:00;  Stop 3/21/20 at 13:59;  Status DC


Sodium Chloride (Normal Saline Flush) 10 ml 1X PRN  PRN IV AP catheter pack;  

Start 3/21/20 at 08:00;  Stop 3/22/20 at 07:59;  Status DC


Sodium Chloride (Normal Saline Flush) 10 ml 1X PRN  PRN IV  catheter pack;  

Start 3/21/20 at 08:00;  Stop 3/22/20 at 07:59;  Status DC


Sodium Chloride 1,000 ml @  400 mls/hr Q2H30M PRN IV PATENCY;  Start 3/21/20 at 

07:50;  Stop 3/21/20 at 19:49;  Status DC


Info (PHARMACY MONITORING -- do not chart) 1 each PRN DAILY  PRN MC SEE 

COMMENTS;  Start 3/21/20 at 08:00;  Status UNV


Info (PHARMACY MONITORING -- do not chart) 1 each PRN DAILY  PRN MC SEE 

COMMENTS;  Start 3/21/20 at 08:00;  Stop 3/23/20 at 08:25;  Status DC


Sodium Chloride 90 meq/Potassium Chloride 15 meq/ Potassium Phosphate 10 mmol/ 

Magnesium Sulfate 10 meq/Calcium Gluconate 20 meq/ Multivitamins 10 ml/Chromium/

Copper/Manganese/ Seleni/Zn 0.5 ml/ Total Parenteral Nutrition/Amino Acids/De

xtrose/ Fat Emulsion Intravenous 1,512 ml @  63 mls/hr TPN  CONT IV  Last 

administered on 3/21/20at 20:57;  Start 3/21/20 at 22:00;  Stop 3/22/20 at 

21:59;  Status DC


Sodium Chloride 90 meq/Potassium Chloride 15 meq/ Potassium Phosphate 15 mmol/ 

Magnesium Sulfate 10 meq/Calcium Gluconate 20 meq/ Multivitamins 10 ml/Chromium/

Copper/Manganese/ Seleni/Zn 0.5 ml/ Total Parenteral Nutrition/Amino 

Acids/Dextrose/ Fat Emulsion Intravenous 1,512 ml @  63 mls/hr TPN  CONT IV ;  

Start 3/22/20 at 22:00;  Stop 3/22/20 at 14:16;  Status DC


Sodium Chloride 90 meq/Potassium Chloride 15 meq/ Potassium Phosphate 15 mmol/ 

Magnesium Sulfate 10 meq/Calcium Gluconate 20 meq/ Multivitamins 10 ml/Chromium/

Copper/Manganese/ Seleni/Zn 0.5 ml/ Total Parenteral Nutrition/Amino Acids

/Dextrose/ Fat Emulsion Intravenous 1,200 ml @  50 mls/hr TPN  CONT IV ;  Start 

3/22/20 at 22:00;  Stop 3/22/20 at 14:17;  Status DC


Sodium Chloride 90 meq/Potassium Chloride 15 meq/ Potassium Phosphate 10 mmol/ 

Magnesium Sulfate 10 meq/Calcium Gluconate 20 meq/ Multivitamins 10 ml/Chromium/

Copper/Manganese/ Seleni/Zn 0.5 ml/ Total Parenteral Nutrition/Amino 

Acids/Dextrose/ Fat Emulsion Intravenous 1,200 ml @  50 mls/hr TPN  CONT IV  

Last administered on 3/22/20at 23:29;  Start 3/22/20 at 22:00;  Stop 3/23/20 at 

21:59;  Status DC


Sodium Chloride 1,000 ml @  1,000 mls/hr Q1H PRN IV hypotension;  Start 3/23/20 

at 07:28;  Stop 3/23/20 at 13:27;  Status DC


Albumin Human 200 ml @  200 mls/hr 1X  ONCE IV  Last administered on 3/23/20at 

08:51;  Start 3/23/20 at 07:30;  Stop 3/23/20 at 08:29;  Status DC


Diphenhydramine HCl (Benadryl) 25 mg 1X PRN  PRN IV ITCHING;  Start 3/23/20 at 

07:30;  Stop 3/24/20 at 07:29;  Status DC


Diphenhydramine HCl (Benadryl) 25 mg 1X PRN  PRN IV ITCHING;  Start 3/23/20 at 

07:30;  Stop 3/24/20 at 07:29;  Status DC


Sodium Chloride 1,000 ml @  400 mls/hr Q2H30M PRN IV PATENCY;  Start 3/23/20 at 

07:28;  Stop 3/23/20 at 19:27;  Status DC


Info (PHARMACY MONITORING -- do not chart) 1 each PRN DAILY  PRN MC SEE 

COMMENTS;  Start 3/23/20 at 07:30;  Stop 4/3/20 at 13:01;  Status DC


Metronidazole 100 ml @  100 mls/hr Q6HRS IV  Last administered on 4/8/20at 

06:26;  Start 3/23/20 at 08:30;  Stop 4/8/20 at 09:58;  Status DC


Micafungin Sodium 100 mg/Dextrose 100 ml @  100 mls/hr Q24H IV  Last 

administered on 4/9/20at 14:51;  Start 3/23/20 at 09:00


Propofol 0 ml @ As Directed STK-MED ONCE IV ;  Start 3/23/20 at 07:53;  Stop 

3/23/20 at 07:53;  Status DC


Etomidate (Amidate) 20 mg STK-MED ONCE IV ;  Start 3/23/20 at 07:53;  Stop 

3/23/20 at 07:54;  Status DC


Midazolam HCl (Versed) 5 mg STK-MED ONCE .ROUTE ;  Start 3/23/20 at 07:57;  Stop

3/23/20 at 07:57;  Status DC


Fentanyl Citrate 30 ml @ 0 mls/hr CONT  PRN IV SEE PROTOCOL Last administered on

4/9/20at 10:26;  Start 3/23/20 at 08:15


Artificial Tears (Artificial Tears) 1 drop PRN Q1HR  PRN OU DRY EYE, 1st choice;

 Start 3/23/20 at 08:15


Midazolam HCl 50 mg/Sodium Chloride 50 ml @ 0 mls/hr CONT  PRN IV SEE PROTOCOL 

Last administered on 3/26/20at 22:39;  Start 3/23/20 at 08:15;  Stop 3/28/20 at 

15:59;  Status DC


Etomidate (Amidate) 8 mg 1X  ONCE IV  Last administered on 3/23/20at 08:33;  

Start 3/23/20 at 08:30;  Stop 3/23/20 at 08:31;  Status DC


Succinylcholine Chloride (Anectine) 120 mg 1X  ONCE IV  Last administered on 

3/23/20at 08:34;  Start 3/23/20 at 08:30;  Stop 3/23/20 at 08:31;  Status DC


Midazolam HCl (Versed) 5 mg 1X  ONCE IV ;  Start 3/23/20 at 08:30;  Stop 3/23/20

at 08:31;  Status DC


Potassium Chloride 15 meq/ Bicarbonate Dialysis Soln w/ out KCl 5,007.5 ml  @ 

1,000 mls/ hr Q5H1M IV  Last administered on 3/24/20at 11:11;  Start 3/23/20 at 

12:00;  Stop 3/24/20 at 11:15;  Status DC


Potassium Chloride 15 meq/ Bicarbonate Dialysis Soln w/ out KCl 5,007.5 ml  @ 

1,000 mls/ hr Q5H1M IV  Last administered on 3/24/20at 11:12;  Start 3/23/20 at 

12:00;  Stop 3/24/20 at 11:17;  Status DC


Potassium Chloride 15 meq/ Bicarbonate Dialysis Soln w/ out KCl 5,007.5 ml  @ 

1,000 mls/ hr Q5H1M IV  Last administered on 3/24/20at 11:11;  Start 3/23/20 at 

12:00;  Stop 3/24/20 at 11:19;  Status DC


Sodium Chloride 90 meq/Potassium Chloride 15 meq/ Potassium Phosphate 10 mmol/ 

Magnesium Sulfate 10 meq/Calcium Gluconate 20 meq/ Multivitamins 10 ml/Chromium/

Copper/Manganese/ Seleni/Zn 0.5 ml/ Total Parenteral Nutrition/Amino 

Acids/Dextrose/ Fat Emulsion Intravenous 1,400 ml @  58.333 mls/ hr TPN  CONT IV

 Last administered on 3/23/20at 21:42;  Start 3/23/20 at 22:00;  Stop 3/24/20 at

21:59;  Status DC


Heparin Sodium (Porcine) (Heparin Sodium) 5,000 unit Q8HRS SQ  Last administered

on 3/28/20at 05:55;  Start 3/23/20 at 15:00;  Stop 3/28/20 at 13:28;  Status DC


Meropenem 500 mg/ Sodium Chloride 50 ml @  100 mls/hr Q6HRS IV  Last 

administered on 3/25/20at 06:00;  Start 3/24/20 at 09:00;  Stop 3/25/20 at 

07:29;  Status DC


Potassium Phosphate 20 mmol/ Sodium Chloride 106.6667 ml @  51.667 m... 1X  ONCE

IV  Last administered on 3/24/20at 11:22;  Start 3/24/20 at 10:15;  Stop 3/24/20

at 12:18;  Status DC


Acetaminophen (Tylenol Supp) 650 mg PRN Q6HRS  PRN MD MILD PAIN / TEMP Last 

administered on 3/24/20at 10:37;  Start 3/24/20 at 10:30


Potassium Chloride/Water 100 ml @  100 mls/hr Q1H IV  Last administered on 

3/24/20at 12:12;  Start 3/24/20 at 11:00;  Stop 3/24/20 at 12:59;  Status DC


Potassium Chloride 20 meq/ Bicarbonate Dialysis Soln w/ out KCl 5,010 ml @  

1,000 mls/hr Q5H1M IV  Last administered on 3/25/20at 08:48;  Start 3/24/20 at 

12:00;  Stop 3/25/20 at 13:03;  Status DC


Potassium Chloride 20 meq/ Bicarbonate Dialysis Soln w/ out KCl 5,010 ml @  

1,000 mls/hr Q5H1M IV  Last administered on 3/29/20at 14:52;  Start 3/24/20 at 

11:30;  Stop 3/29/20 at 19:59;  Status DC


Potassium Chloride 20 meq/ Bicarbonate Dialysis Soln w/ out KCl 5,010 ml @  

1,000 mls/hr Q5H1M IV  Last administered on 3/29/20at 14:53;  Start 3/24/20 at 

11:30;  Stop 3/29/20 at 19:59;  Status DC


Sodium Chloride 90 meq/Potassium Chloride 15 meq/ Potassium Phosphate 15 mmol/ 

Magnesium Sulfate 10 meq/Calcium Gluconate 15 meq/ Multivitamins 10 ml/Chromium/

Copper/Manganese/ Seleni/Zn 0.5 ml/ Total Parenteral Nutrition/Amino 

Acids/Dextrose/ Fat Emulsion Intravenous 1,400 ml @  58.333 mls/ hr TPN  CONT IV

 Last administered on 3/24/20at 22:17;  Start 3/24/20 at 22:00;  Stop 3/25/20 at

21:59;  Status DC


Cefepime HCl (Maxipime) 2 gm Q12HR IVP  Last administered on 4/7/20at 20:56;  

Start 3/25/20 at 09:00;  Stop 4/8/20 at 09:58;  Status DC


Daptomycin 500 mg/ Sodium Chloride 50 ml @  100 mls/hr Q48H IV  Last 

administered on 4/8/20at 14:02;  Start 3/25/20 at 08:30


Lidocaine HCl (Buffered Lidocaine 1%) 3 ml 1X  ONCE INJ  Last administered on 

3/25/20at 10:27;  Start 3/25/20 at 10:30;  Stop 3/25/20 at 10:31;  Status DC


Potassium Phosphate 20 mmol/ Sodium Chloride 106.6667 ml @  51.667 m... 1X  ONCE

IV  Last administered on 3/25/20at 12:51;  Start 3/25/20 at 13:00;  Stop 3/25/20

at 15:03;  Status DC


Sodium Chloride 90 meq/Potassium Chloride 15 meq/ Potassium Phosphate 18 mmol/ 

Magnesium Sulfate 8 meq/Calcium Gluconate 15 meq/ Multivitamins 10 ml/Chromium/ 

Copper/Manganese/ Seleni/Zn 0.5 ml/ Total Parenteral Nutrition/Amino 

Acids/Dextrose/ Fat Emulsion Intravenous 1,400 ml @  58.333 mls/ hr TPN  CONT IV

 Last administered on 3/25/20at 22:16;  Start 3/25/20 at 22:00;  Stop 3/26/20 at

21:59;  Status DC


Potassium Chloride 20 meq/ Bicarbonate Dialysis Soln w/ out KCl 5,010 ml @  

1,000 mls/hr Q5H1M IV  Last administered on 3/29/20at 14:54;  Start 3/25/20 at 

16:00;  Stop 3/29/20 at 19:59;  Status DC


Multi-Ingred Cream/Lotion/Oil/ Oint (Artificial Tears Eye Ointment) 1 thomas PRN 

Q1HR  PRN OU DRY EYE, 2nd choice Last administered on 4/3/20at 11:05;  Start 

3/25/20 at 17:30


Sodium Chloride 90 meq/Potassium Chloride 15 meq/ Potassium Phosphate 18 mmol/ 

Magnesium Sulfate 8 meq/Calcium Gluconate 15 meq/ Multivitamins 10 ml/Chromium/ 

Copper/Manganese/ Seleni/Zn 0.5 ml/ Total Parenteral Nutrition/Amino 

Acids/Dextrose/ Fat Emulsion Intravenous 1,400 ml @  58.333 mls/ hr TPN  CONT IV

 Last administered on 3/26/20at 22:00;  Start 3/26/20 at 22:00;  Stop 3/27/20 at

21:59;  Status DC


Albumin Human 500 ml @  125 mls/hr 1X  ONCE IV ;  Start 3/26/20 at 14:15;  Stop 

3/26/20 at 18:14;  Status DC


Sodium Chloride 90 meq/Potassium Chloride 15 meq/ Potassium Phosphate 18 mmol/ 

Magnesium Sulfate 8 meq/Calcium Gluconate 15 meq/ Multivitamins 10 ml/Chromium/ 

Copper/Manganese/ Seleni/Zn 0.5 ml/ Insulin Human Regular 10 unit/ Total 

Parenteral Nutrition/Amino Acids/Dextrose/ Fat Emulsion Intravenous 1,400 ml @  

58.333 mls/ hr TPN  CONT IV  Last administered on 3/27/20at 21:43;  Start 

3/27/20 at 22:00;  Stop 3/28/20 at 21:59;  Status DC


Lidocaine HCl (Buffered Lidocaine 1%) 3 ml STK-MED ONCE .ROUTE ;  Start 3/25/20 

at 10:00;  Stop 3/27/20 at 13:57;  Status DC


Midazolam HCl 100 mg/Sodium Chloride 100 ml @ 7 mls/hr CONT  PRN IV SEE PROTOCOL

Last administered on 4/8/20at 15:35;  Start 3/28/20 at 16:00


Sodium Chloride 90 meq/Potassium Chloride 15 meq/ Potassium Phosphate 18 mmol/ 

Magnesium Sulfate 8 meq/Calcium Gluconate 15 meq/ Multivitamins 10 ml/Chromium/ 

Copper/Manganese/ Seleni/Zn 0.5 ml/ Insulin Human Regular 15 unit/ Total 

Parenteral Nutrition/Amino Acids/Dextrose/ Fat Emulsion Intravenous 1,400 ml @  

58.333 mls/ hr TPN  CONT IV  Last administered on 3/28/20at 20:34;  Start 

3/28/20 at 22:00;  Stop 3/29/20 at 21:59;  Status DC


Info ([facility] Electrolyte Protocol) 1 ea CONT PRN  PRN MC PER PROTOCOL;  Start 

3/29/20 at 13:15


Sodium Chloride 90 meq/Potassium Chloride 15 meq/ Potassium Phosphate 18 mmol/ M

agnesium Sulfate 8 meq/Calcium Gluconate 15 meq/ Multivitamins 10 ml/Chromium/ 

Copper/Manganese/ Seleni/Zn 0.5 ml/ Insulin Human Regular 15 unit/ Total 

Parenteral Nutrition/Amino Acids/Dextrose/ Fat Emulsion Intravenous 1,400 ml @  

58.333 mls/ hr TPN  CONT IV  Last administered on 3/29/20at 22:05;  Start 

3/29/20 at 22:00;  Stop 3/30/20 at 21:59;  Status DC


Potassium Chloride 15 meq/ Bicarbonate Dialysis Soln w/ out KCl 5,007.5 ml  @ 

1,000 mls/ hr Q5H1M IV  Last administered on 4/1/20at 18:14;  Start 3/29/20 at 

20:00;  Stop 4/2/20 at 13:08;  Status DC


Potassium Chloride 15 meq/ Bicarbonate Dialysis Soln w/ out KCl 5,007.5 ml  @ 

1,000 mls/ hr Q5H1M IV  Last administered on 4/1/20at 18:14;  Start 3/29/20 at 

20:00;  Stop 4/2/20 at 13:08;  Status DC


Potassium Chloride 15 meq/ Bicarbonate Dialysis Soln w/ out KCl 5,007.5 ml  @ 

1,000 mls/ hr Q5H1M IV  Last administered on 4/1/20at 18:14;  Start 3/29/20 at 

20:00;  Stop 4/2/20 at 13:08;  Status DC


Iohexol (Omnipaque 240 Mg/ml) 30 ml 1X  ONCE PO  Last administered on 3/30/20at 

11:30;  Start 3/30/20 at 11:30;  Stop 3/30/20 at 11:33;  Status DC


Info (CONTRAST GIVEN -- Rx MONITORING) 1 each PRN DAILY  PRN MC SEE COMMENTS;  

Start 3/30/20 at 11:45;  Stop 4/1/20 at 11:44;  Status DC


Sodium Chloride 90 meq/Potassium Chloride 15 meq/ Potassium Phosphate 18 mmol/ 

Magnesium Sulfate 8 meq/Calcium Gluconate 15 meq/ Multivitamins 10 ml/Chromium/ 

Copper/Manganese/ Seleni/Zn 0.5 ml/ Insulin Human Regular 15 unit/ Total 

Parenteral Nutrition/Amino Acids/Dextrose/ Fat Emulsion Intravenous 1,400 ml @  

58.333 mls/ hr TPN  CONT IV  Last administered on 3/30/20at 21:47;  Start 

3/30/20 at 22:00;  Stop 3/31/20 at 21:59;  Status DC


Sodium Chloride 90 meq/Potassium Chloride 15 meq/ Potassium Phosphate 18 mmol/ 

Magnesium Sulfate 8 meq/Calcium Gluconate 15 meq/ Multivitamins 10 ml/Chromium/ 

Copper/Manganese/ Seleni/Zn 0.5 ml/ Insulin Human Regular 20 unit/ Total 

Parenteral Nutrition/Amino Acids/Dextrose/ Fat Emulsion Intravenous 1,400 ml @  

58.333 mls/ hr TPN  CONT IV  Last administered on 3/31/20at 21:36;  Start 

3/31/20 at 22:00;  Stop 4/1/20 at 21:59;  Status DC


Alteplase, Recombinant (Cathflo For Central Catheter Clearance) 1 mg 1X  ONCE 

INT CAT  Last administered on 3/31/20at 20:03;  Start 3/31/20 at 19:30;  Stop 

3/31/20 at 19:46;  Status DC


Alteplase, Recombinant (Cathflo For Central Catheter Clearance) 1 mg 1X  ONCE 

INT CAT  Last administered on 3/31/20at 22:05;  Start 3/31/20 at 22:00;  Stop 

3/31/20 at 22:01;  Status DC


Sodium Chloride 90 meq/Potassium Chloride 15 meq/ Potassium Phosphate 18 mmol/ 

Magnesium Sulfate 8 meq/Calcium Gluconate 15 meq/ Multivitamins 10 ml/Chromium/ 

Copper/Manganese/ Seleni/Zn 0.5 ml/ Insulin Human Regular 20 unit/ Total 

Parenteral Nutrition/Amino Acids/Dextrose/ Fat Emulsion Intravenous 1,400 ml @  

58.333 mls/ hr TPN  CONT IV  Last administered on 4/1/20at 21:30;  Start 4/1/20 

at 22:00;  Stop 4/2/20 at 21:59;  Status DC


Dexmedetomidine HCl 400 mcg/ Sodium Chloride 100 ml @ 0 mls/hr CONT  PRN IV 

ANXIETY / AGITATION Last administered on 4/9/20at 13:36;  Start 4/2/20 at 08:15


Sodium Chloride 500 ml @  500 mls/hr 1X PRN  PRN IV ELEVATED BP, SEE COMMENTS;  

Start 4/2/20 at 08:15


Atropine Sulfate (ATROPINE 0.5mg SYRINGE) 0.5 mg PRN Q5MIN  PRN IV SEE COMMENTS;

 Start 4/2/20 at 08:15


Furosemide (Lasix) 20 mg 1X  ONCE IVP  Last administered on 4/2/20at 08:19;  

Start 4/2/20 at 08:15;  Stop 4/2/20 at 08:16;  Status DC


Lidocaine HCl (Buffered Lidocaine 1%) 3 ml STK-MED ONCE .ROUTE ;  Start 4/2/20 

at 08:39;  Stop 4/2/20 at 08:39;  Status DC


Lidocaine HCl (Buffered Lidocaine 1%) 6 ml 1X  ONCE INJ  Last administered on 

4/2/20at 09:05;  Start 4/2/20 at 09:00;  Stop 4/2/20 at 09:06;  Status DC


Sodium Chloride 90 meq/Potassium Chloride 15 meq/ Potassium Phosphate 18 mmol/ 

Magnesium Sulfate 8 meq/Calcium Gluconate 15 meq/ Multivitamins 10 ml/Chromium/ 

Copper/Manganese/ Seleni/Zn 0.5 ml/ Insulin Human Regular 20 unit/ Total 

Parenteral Nutrition/Amino Acids/Dextrose/ Fat Emulsion Intravenous 1,400 ml @  

58.333 mls/ hr TPN  CONT IV  Last administered on 4/2/20at 22:45;  Start 4/2/20 

at 22:00;  Stop 4/3/20 at 21:59;  Status DC


Sodium Chloride 1,000 ml @  1,000 mls/hr Q1H PRN IV hypotension;  Start 4/3/20 

at 07:30;  Stop 4/3/20 at 13:29;  Status DC


Albumin Human 200 ml @  200 mls/hr 1X PRN  PRN IV Hypotension Last administered 

on 4/3/20at 09:36;  Start 4/3/20 at 07:30;  Stop 4/3/20 at 13:29;  Status DC


Sodium Chloride (Normal Saline Flush) 10 ml 1X PRN  PRN IV AP catheter pack;  

Start 4/3/20 at 07:30;  Stop 4/3/20 at 21:29;  Status DC


Sodium Chloride (Normal Saline Flush) 10 ml 1X PRN  PRN IV  catheter pack;  

Start 4/3/20 at 07:30;  Stop 4/4/20 at 07:29;  Status DC


Sodium Chloride 1,000 ml @  400 mls/hr Q2H30M PRN IV PATENCY;  Start 4/3/20 at 

07:30;  Stop 4/3/20 at 19:29;  Status DC


Info (PHARMACY MONITORING -- do not chart) 1 each PRN DAILY  PRN MC SEE 

COMMENTS;  Start 4/3/20 at 07:30;  Stop 4/3/20 at 13:02;  Status DC


Info (PHARMACY MONITORING -- do not chart) 1 each PRN DAILY  PRN MC SEE 

COMMENTS;  Start 4/3/20 at 07:30;  Stop 4/5/20 at 12:45;  Status DC


Sodium Chloride 90 meq/Potassium Chloride 15 meq/ Potassium Phosphate 10 mmol/ 

Magnesium Sulfate 8 meq/Calcium Gluconate 15 meq/ Multivitamins 10 ml/Chromium/ 

Copper/Manganese/ Seleni/Zn 0.5 ml/ Insulin Human Regular 25 unit/ Total 

Parenteral Nutrition/Amino Acids/Dextrose/ Fat Emulsion Intravenous 1,400 ml @  

58.333 mls/ hr TPN  CONT IV  Last administered on 4/3/20at 22:19;  Start 4/3/20 

at 22:00;  Stop 4/4/20 at 21:59;  Status DC


Heparin Sodium (Porcine) (Heparin Sodium) 5,000 unit Q12HR SQ  Last administered

on 4/9/20at 14:56;  Start 4/3/20 at 21:00


Ondansetron HCl (Zofran) 4 mg PRN Q6HRS  PRN IV NAUSEA/VOMITING;  Start 4/6/20 

at 07:00;  Stop 4/7/20 at 06:59;  Status DC


Fentanyl Citrate (Fentanyl 2ml Vial) 25 mcg PRN Q5MIN  PRN IV MILD PAIN 1-3;  

Start 4/6/20 at 07:00;  Stop 4/7/20 at 06:59;  Status DC


Fentanyl Citrate (Fentanyl 2ml Vial) 50 mcg PRN Q5MIN  PRN IV MODERATE TO SEVERE

PAIN;  Start 4/6/20 at 07:00;  Stop 4/7/20 at 06:59;  Status DC


Ringer's Solution 1,000 ml @  30 mls/hr Q24H IV ;  Start 4/6/20 at 07:00;  Stop 

4/6/20 at 18:59;  Status DC


Lidocaine HCl (Xylocaine-Mpf 1% 2ml Vial) 2 ml PRN 1X  PRN ID PRIOR TO IV START;

 Start 4/6/20 at 07:00;  Stop 4/7/20 at 06:59;  Status DC


Prochlorperazine Edisylate (Compazine) 5 mg PACU PRN  PRN IV NAUSEA, MRX1;  

Start 4/6/20 at 07:00;  Stop 4/7/20 at 06:59;  Status DC


Sodium Chloride 1,000 ml @  1,000 mls/hr Q1H PRN IV hypotension;  Start 4/4/20 

at 09:10;  Stop 4/4/20 at 15:09;  Status DC


Albumin Human 200 ml @  200 mls/hr 1X PRN  PRN IV Hypotension Last administered 

on 4/4/20at 10:10;  Start 4/4/20 at 09:15;  Stop 4/4/20 at 15:14;  Status DC


Sodium Chloride 1,000 ml @  400 mls/hr Q2H30M PRN IV PATENCY;  Start 4/4/20 at 

09:10;  Stop 4/4/20 at 21:09;  Status DC


Info (PHARMACY MONITORING -- do not chart) 1 each PRN DAILY  PRN MC SEE 

COMMENTS;  Start 4/4/20 at 09:15;  Stop 4/5/20 at 12:45;  Status DC


Info (PHARMACY MONITORING -- do not chart) 1 each PRN DAILY  PRN MC SEE 

COMMENTS;  Start 4/4/20 at 09:15;  Stop 4/5/20 at 12:45;  Status DC


Sodium Chloride 90 meq/Potassium Chloride 15 meq/ Potassium Phosphate 10 mmol/ 

Magnesium Sulfate 8 meq/Calcium Gluconate 15 meq/ Multivitamins 10 ml/Chromium/ 

Copper/Manganese/ Seleni/Zn 0.5 ml/ Insulin Human Regular 25 unit/ Total 

Parenteral Nutrition/Amino Acids/Dextrose/ Fat Emulsion Intravenous 1,400 ml @  

58.333 mls/ hr TPN  CONT IV  Last administered on 4/4/20at 22:10;  Start 4/4/20 

at 22:00;  Stop 4/5/20 at 21:59;  Status DC


Magnesium Sulfate 50 ml @ 25 mls/hr PRN DAILY  PRN IV for Mag < 1.7 on am labs; 

Start 4/5/20 at 09:15


Sodium Chloride 90 meq/Potassium Chloride 15 meq/ Potassium Phosphate 10 mmol/ 

Magnesium Sulfate 8 meq/Calcium Gluconate 15 meq/ Multivitamins 10 ml/Chromium/ 

Copper/Manganese/ Seleni/Zn 0.5 ml/ Insulin Human Regular 25 unit/ Total 

Parenteral Nutrition/Amino Acids/Dextrose/ Fat Emulsion Intravenous 1,400 ml @  

58.333 mls/ hr TPN  CONT IV  Last administered on 4/5/20at 21:20;  Start 4/5/20 

at 22:00;  Stop 4/6/20 at 21:59;  Status DC


Sodium Chloride 1,000 ml @  1,000 mls/hr Q1H PRN IV hypotension;  Start 4/5/20 

at 12:23;  Stop 4/5/20 at 18:22;  Status DC


Albumin Human 200 ml @  200 mls/hr 1X  ONCE IV  Last administered on 4/5/20at 

13:34;  Start 4/5/20 at 12:30;  Stop 4/5/20 at 13:29;  Status DC


Diphenhydramine HCl (Benadryl) 25 mg 1X PRN  PRN IV ITCHING;  Start 4/5/20 at 

12:30;  Stop 4/6/20 at 12:29;  Status DC


Diphenhydramine HCl (Benadryl) 25 mg 1X PRN  PRN IV ITCHING;  Start 4/5/20 at 

12:30;  Stop 4/6/20 at 12:29;  Status DC


Info (PHARMACY MONITORING -- do not chart) 1 each PRN DAILY  PRN MC SEE 

COMMENTS;  Start 4/5/20 at 12:30;  Status Cancel


Bupivacaine HCl/ Epinephrine Bitart (Sensorcain-Epi 0.5%-1:675015 Mpf) 30 ml 

STK-MED ONCE .ROUTE  Last administered on 4/6/20at 11:44;  Start 4/6/20 at 

11:00;  Stop 4/6/20 at 11:01;  Status DC


Cellulose (Surgicel Fibrillar 1x2) 1 each STK-MED ONCE .ROUTE ;  Start 4/6/20 at

11:00;  Stop 4/6/20 at 11:01;  Status DC


Sodium Chloride 90 meq/Potassium Chloride 15 meq/ Potassium Phosphate 10 mmol/ 

Magnesium Sulfate 12 meq/Calcium Gluconate 15 meq/ Multivitamins 10 ml/Chromium/

Copper/Manganese/ Seleni/Zn 0.5 ml/ Insulin Human Regular 25 unit/ Total 

Parenteral Nutrition/Amino Acids/Dextrose/ Fat Emulsion Intravenous 1,400 ml @  

58.333 mls/ hr TPN  CONT IV  Last administered on 4/6/20at 22:24;  Start 4/6/20 

at 22:00;  Stop 4/7/20 at 21:59;  Status DC


Propofol 20 ml @ As Directed STK-MED ONCE IV ;  Start 4/6/20 at 11:07;  Stop 

4/6/20 at 11:07;  Status DC


Cellulose (Surgicel Hemostat 4x8) 1 each STK-MED ONCE .ROUTE  Last administered 

on 4/6/20at 11:44;  Start 4/6/20 at 11:55;  Stop 4/6/20 at 11:56;  Status DC


Sevoflurane (Ultane) 60 ml STK-MED ONCE IH ;  Start 4/6/20 at 12:46;  Stop 

4/6/20 at 12:46;  Status DC


Sodium Chloride 1,000 ml @  1,000 mls/hr Q1H PRN IV hypotension;  Start 4/6/20 

at 13:51;  Stop 4/6/20 at 19:50;  Status DC


Albumin Human 200 ml @  200 mls/hr 1X PRN  PRN IV Hypotension Last administered 

on 4/6/20at 14:51;  Start 4/6/20 at 14:00;  Stop 4/6/20 at 19:59;  Status DC


Diphenhydramine HCl (Benadryl) 25 mg 1X PRN  PRN IV ITCHING;  Start 4/6/20 at 

14:00;  Stop 4/7/20 at 13:59;  Status DC


Diphenhydramine HCl (Benadryl) 25 mg 1X PRN  PRN IV ITCHING;  Start 4/6/20 at 

14:00;  Stop 4/7/20 at 13:59;  Status DC


Sodium Chloride 1,000 ml @  400 mls/hr Q2H30M PRN IV PATENCY;  Start 4/6/20 at 

13:51;  Stop 4/7/20 at 01:50;  Status DC


Info (PHARMACY MONITORING -- do not chart) 1 each PRN DAILY  PRN MC SEE 

COMMENTS;  Start 4/6/20 at 14:00;  Stop 4/9/20 at 08:16;  Status DC


Heparin Sodium (Porcine) (Hep Lock Adult) 500 unit STK-MED ONCE IVP ;  Start 

4/7/20 at 09:29;  Stop 4/7/20 at 09:30;  Status DC


Sodium Chloride 1,000 ml @  1,000 mls/hr Q1H PRN IV hypotension;  Start 4/7/20 

at 10:43;  Stop 4/7/20 at 16:42;  Status DC


Sodium Chloride 1,000 ml @  400 mls/hr Q2H30M PRN IV PATENCY;  Start 4/7/20 at 

10:43;  Stop 4/7/20 at 22:42;  Status DC


Info (PHARMACY MONITORING -- do not chart) 1 each PRN DAILY  PRN MC SEE 

COMMENTS;  Start 4/7/20 at 10:45;  Status UNV


Info (PHARMACY MONITORING -- do not chart) 1 each PRN DAILY  PRN MC SEE 

COMMENTS;  Start 4/7/20 at 10:45;  Status UNV


Sodium Chloride 90 meq/Potassium Chloride 15 meq/ Magnesium Sulfate 12 

meq/Calcium Gluconate 15 meq/ Multivitamins 10 ml/Chromium/ Copper/Manganese/ 

Seleni/Zn 0.5 ml/ Insulin Human Regular 25 unit/ Total Parenteral 

Nutrition/Amino Acids/Dextrose/ Fat Emulsion Intravenous 1,400 ml @  58.333 mls/

hr TPN  CONT IV  Last administered on 4/7/20at 22:13;  Start 4/7/20 at 22:00;  

Stop 4/8/20 at 21:59;  Status DC


Sodium Chloride 1,000 ml @  1,000 mls/hr Q1H PRN IV hypotension;  Start 4/8/20 

at 07:50;  Stop 4/8/20 at 13:49;  Status DC


Albumin Human 200 ml @  200 mls/hr 1X  ONCE IV ;  Start 4/8/20 at 08:00;  Stop 

4/8/20 at 08:53;  Status DC


Diphenhydramine HCl (Benadryl) 25 mg 1X PRN  PRN IV ITCHING;  Start 4/8/20 at 

08:00;  Stop 4/9/20 at 07:59;  Status DC


Diphenhydramine HCl (Benadryl) 25 mg 1X PRN  PRN IV ITCHING;  Start 4/8/20 at 

08:00;  Stop 4/9/20 at 07:59;  Status DC


Info (PHARMACY MONITORING -- do not chart) 1 each PRN DAILY  PRN MC SEE 

COMMENTS;  Start 4/8/20 at 08:00;  Stop 4/9/20 at 08:16;  Status DC


Albumin Human 50 ml @ 50 mls/hr 1X  ONCE IV ;  Start 4/8/20 at 08:53;  Stop 

4/8/20 at 08:56;  Status DC


Albumin Human 200 ml @  50 mls/hr PRN 1X  PRN IV HYPOTENSION Last administered 

on 4/8/20at 09:09;  Start 4/8/20 at 09:00


Meropenem 500 mg/ Sodium Chloride 50 ml @  100 mls/hr Q12H IV  Last administered

on 4/9/20at 14:51;  Start 4/8/20 at 10:00


Sodium Chloride 90 meq/Magnesium Sulfate 12 meq/ Calcium Gluconate 15 meq/ 

Multivitamins 10 ml/Chromium/ Copper/Manganese/ Seleni/Zn 0.5 ml/ Insulin Human 

Regular 25 unit/ Total Parenteral Nutrition/Amino Acids/Dextrose/ Fat Emulsion I

ntravenous 1,400 ml @  58.333 mls/ hr TPN  CONT IV  Last administered on 

4/8/20at 21:41;  Start 4/8/20 at 22:00;  Stop 4/9/20 at 21:59


Sodium Chloride 1,000 ml @  1,000 mls/hr Q1H PRN IV hypotension;  Start 4/9/20 

at 07:58;  Stop 4/9/20 at 13:57;  Status DC


Albumin Human 200 ml @  200 mls/hr 1X PRN  PRN IV Hypotension Last administered 

on 4/9/20at 09:30;  Start 4/9/20 at 08:00;  Stop 4/9/20 at 13:59;  Status DC


Sodium Chloride 1,000 ml @  400 mls/hr Q2H30M PRN IV PATENCY;  Start 4/9/20 at 

07:58;  Stop 4/9/20 at 19:57


Info (PHARMACY MONITORING -- do not chart) 1 each PRN DAILY  PRN MC SEE 

COMMENTS;  Start 4/9/20 at 08:00


Info (PHARMACY MONITORING -- do not chart) 1 each PRN DAILY  PRN MC SEE 

COMMENTS;  Start 4/9/20 at 08:15;  Status UNV


Sodium Chloride 90 meq/Potassium Phosphate 5 mmol/ Magnesium Sulfate 12 

meq/Calcium Gluconate 15 meq/ Multivitamins 10 ml/Chromium/ Copper/Manganese/ 

Seleni/Zn 0.5 ml/ Insulin Human Regular 30 unit/ Total Parenteral 

Nutrition/Amino Acids/Dextrose/ Fat Emulsion Intravenous 1,400 ml @  58.333 mls/

hr TPN  CONT IV ;  Start 4/9/20 at 22:00;  Stop 4/10/20 at 21:59





Active Scripts


Active


Reported


Bisoprolol Fumarate 5 Mg Tablet 10 Mg PO DAILY


Vitals/I & O





Vital Sign - Last 24 Hours








 4/8/20 4/8/20 4/8/20 4/8/20





 17:00 18:00 19:00 20:00


 


Temp    98.3





    98.3


 


Pulse 101 102 98 98


 


Resp 18 18 18 18


 


B/P (MAP) 83/48 (60) 106/63 (77) 102/49 (66) 94/50 (65)


 


Pulse Ox 98 97 95 95


 


O2 Delivery Ventilator Ventilator Ventilator Ventilator


 


    





    





 4/8/20 4/8/20 4/8/20 4/8/20





 20:00 20:10 21:00 22:00


 


Pulse   96 97


 


Resp   18 18


 


B/P (MAP)   113/65 (81) 104/70 (81)


 


Pulse Ox  95 97 96


 


O2 Delivery Mechanical Ventilator Ventilator Ventilator Ventilator





 4/8/20 4/8/20 4/9/20 4/9/20





 23:00 23:15 00:00 00:00


 


Temp    99.8





    99.8


 


Pulse 98   101


 


Resp 18   18


 


B/P (MAP) 104/67 (79)   111/65 (80)


 


Pulse Ox 95 96  96


 


O2 Delivery Ventilator Ventilator Mechanical Ventilator Ventilator


 


    





    





 4/9/20 4/9/20 4/9/20 4/9/20





 01:00 02:00 03:00 04:00


 


Temp    98.8





    98.8


 


Pulse 101 102 100 100


 


Resp 18 19 18 18


 


B/P (MAP) 123/70 (87) 117/66 (83) 113/68 (83) 105/71 (82)


 


Pulse Ox 97 96 97 96


 


O2 Delivery Ventilator Ventilator Ventilator Ventilator


 


    





    





 4/9/20 4/9/20 4/9/20 4/9/20





 04:00 04:00 05:00 06:00


 


Pulse   97 90


 


Resp   18 18


 


B/P (MAP)   99/57 (71) 100/54 (69)


 


Pulse Ox  96 97 98


 


O2 Delivery Mechanical Ventilator Ventilator Ventilator Ventilator





 4/9/20 4/9/20 4/9/20 4/9/20





 07:00 08:00 08:00 08:08


 


Temp   99.8 





   99.8 


 


Pulse 96  95 


 


Resp 18  18 


 


B/P (MAP) 108/59 (75)  94/53 (67) 


 


Pulse Ox 98  97 98


 


O2 Delivery Ventilator Mechanical Ventilator Ventilator Ventilator


 


    





    





 4/9/20 4/9/20 4/9/20 4/9/20





 10:26 12:09 12:15 13:00


 


Pulse   96 94


 


Resp   18 18


 


B/P (MAP)   110/56 (74) 106/64 (78)


 


Pulse Ox 98 100 100 100


 


O2 Delivery  Ventilator Ventilator Ventilator





 4/9/20 4/9/20 4/9/20 4/9/20





 14:30 14:48 15:00 15:00


 


Temp 99.4   





 99.4   


 


Pulse 98   90


 


Resp 18   18


 


B/P (MAP) 112/56 (74)   86/56 (66)


 


Pulse Ox 100 100  99


 


O2 Delivery Ventilator Ventilator Mechanical Ventilator Ventilator


 


    





    





 4/9/20   





 16:00   


 


Pulse 96   


 


Resp 17   


 


B/P (MAP) 124/66 (85)   


 


Pulse Ox 99   


 


O2 Delivery Ventilator   














Intake and Output   


 


 4/8/20 4/8/20 4/9/20





 15:00 23:00 07:00


 


Intake Total 410 ml 1618.78 ml 1276 ml


 


Output Total 100 ml 60 ml 410 ml


 


Balance 310 ml 1558.78 ml 866 ml











Hemodynamically unstable?:  No


Is patient in severe pain?:  No


Is NPO status required?:  Yes











CHEY TURNER MD               Apr 9, 2020 16:45

## 2020-04-09 NOTE — NUR
SS following up with discharge planning. SS discussed with RN. Pt remains on the vent at 
this time. Trach placed on 4/6/2020. Pt on TPN and IV abx. Pt's daughters attempting to 
attain court order to gain access to pt's finances for Medicaid application. SS will 
continue to follow for discharge planning.

## 2020-04-09 NOTE — PDOC
SUBJECTIVE


ROS


sedated and intubated





OBJECTIVE


Vital Signs





Vital Signs








  Date Time  Temp Pulse Resp B/P (MAP) Pulse Ox O2 Delivery O2 Flow Rate FiO2


 


4/9/20 10:26     98   


 


4/9/20 08:08      Ventilator  


 


4/9/20 08:00 99.8 95 18 94/53 (67)    





 99.8       








I & 0











Intake and Output 


 


 4/9/20





 07:00


 


Intake Total 3304.78 ml


 


Output Total 570 ml


 


Balance 2734.78 ml


 


 


 


IV Total 3304.78 ml


 


Output Urine Total 220 ml


 


Gastric Drainage Total 350 ml











PHYSICAL EXAM


Physical Exam


GENERAL: On MV  


HEENT om moist  


NECK: Trach +  


LUNGS: Diminished aeration bases 


HEART:  S1, S2, regular 


ABDOMEN:  Distended, hypoactive BS, Rectal tube in place 


: [place]   


EXTREMITIES: Generalized edema,  some mottling


DERMATOLOGIC:   No generalized rash.  


CENTRAL NERVOUS SYSTEM: Sedated 


RIJ Temp HDC





DIAGNOSIS/ASSESSMENT


Assessment & Plan





ARF-- ATN,    Off CRRT 


Seen on HD, treatment plan discussed and reviewed with Robert  


  TTS SCHEDULE ,No  Renal recovery yet   , Monitor 


 


Anemia- per primary  


Currently on YOLI 





Anasarca due to 3rd spacing 





Hyperkalemia- resolved  





AC Pancreatitis, early developing necrosis


No intervention per GS  





Cholelithiasis





Acute hypoxic resp failure ,bilateral pleural effusion


On MV  





hypocalcemia - stable 





HTN- Hypotensive , pressors as indicated





COVID-19 neg, 3/26





COMMENT/RELEVANT DATA


Meds





Current Medications








 Medications


  (Trade)  Dose


 Ordered  Sig/Yee  Start Time


 Stop Time Status Last Admin


Dose Admin


 


 Acetaminophen


  (Tylenol Supp)  650 mg  PRN Q6HRS  PRN  3/24/20 10:30


    3/24/20 10:37


650 MG


 


 Acetaminophen


  (Tylenol)  650 mg  PRN Q6HRS  PRN  3/21/20 03:36


    3/26/20 07:35


650 MG


 


 Albumin Human  200 ml @ 


 200 mls/hr  1X PRN  PRN  4/9/20 08:00


 4/9/20 13:59  4/9/20 09:30


200 MLS/HR


 


 Albuterol Sulfate


  (Ventolin Neb


 Soln)  2.5 mg  1X  ONCE  3/17/20 22:30


 3/17/20 22:31 DC 3/18/20 00:56


2.5 MG


 


 Alteplase,


 Recombinant


  (Cathflo For


 Central Catheter


 Clearance)  1 mg  1X  ONCE  3/31/20 22:00


 3/31/20 22:01 DC 3/31/20 22:05


1 MG


 


 Artificial Tears


  (Artificial


 Tears)  1 drop  PRN Q1HR  PRN  3/23/20 08:15


     





 


 Atenolol


  (Tenormin)  100 mg  DAILY  3/17/20 09:00


 3/16/20 20:08 DC  





 


 Atropine Sulfate


  (ATROPINE 0.5mg


 SYRINGE)  0.5 mg  PRN Q5MIN  PRN  4/2/20 08:15


     





 


 Benzocaine


  (Hurricaine One)  1 spray  1X  ONCE  3/20/20 14:30


 3/20/20 14:31 DC 3/20/20 16:38


1 SPRAY


 


 Bupivacaine HCl/


 Epinephrine Bitart


  (Sensorcain-Epi


 0.5%-1:545340 Mpf)  30 ml  STK-MED ONCE  4/6/20 11:00


 4/6/20 11:01 DC 4/6/20 11:44


1 ML


 


 Calcium Carbonate/


 Glycine


  (Tums)  500 mg  PRN AFTMEALHC  PRN  3/18/20 17:45


     





 


 Calcium Chloride


 1000 mg/Sodium


 Chloride  110 ml @ 


 220 mls/hr  1X  ONCE  3/17/20 22:30


 3/17/20 22:59 DC 3/17/20 22:11


220 MLS/HR


 


 Calcium Chloride


 3000 mg/Sodium


 Chloride  1,030 ml @ 


 50 mls/hr  [id number]  3/19/20 08:00


 3/21/20 15:23 DC 3/21/20 02:17


50 MLS/HR


 


 Calcium Gluconate


  (Calcium


 Gluconate)  2,000 mg  1X  ONCE  3/19/20 02:15


 3/19/20 02:16 DC 3/19/20 02:19


2,000 MG


 


 Calcium Gluconate


 1000 mg/Sodium


 Chloride  110 ml @ 


 220 mls/hr  1X  ONCE  3/18/20 03:30


 3/18/20 03:59 DC 3/18/20 03:21


220 MLS/HR


 


 Calcium Gluconate


 2000 mg/Sodium


 Chloride  120 ml @ 


 220 mls/hr  1X  ONCE  3/18/20 07:30


 3/18/20 08:02 DC 3/18/20 09:05


220 MLS/HR


 


 Cefepime HCl


  (Maxipime)  2 gm  Q12HR  3/25/20 09:00


 4/8/20 09:58 DC 4/7/20 20:56


2 GM


 


 Cellulose


  (Surgicel


 Fibrillar 1x2)  1 each  STK-MED ONCE  4/6/20 11:00


 4/6/20 11:01 DC  





 


 Cellulose


  (Surgicel


 Hemostat 4x8)  1 each  STK-MED ONCE  4/6/20 11:55


 4/6/20 11:56 DC 4/6/20 11:44


1 EACH


 


 Daptomycin 500 mg/


 Sodium Chloride  50 ml @ 


 100 mls/hr  Q48H  3/25/20 08:30


    4/8/20 14:02


100 MLS/HR


 


 Dexmedetomidine


 HCl 400 mcg/


 Sodium Chloride  100 ml @ 0


 mls/hr  CONT  PRN  4/2/20 08:15


    4/9/20 10:00


27.8 MLS/HR


 


 Dextrose


  (Dextrose


 50%-Water Syringe)  12.5 gm  PRN Q15MIN  PRN  3/16/20 09:30


     





 


 Digoxin


  (Lanoxin)  125 mcg  1X  ONCE  3/19/20 18:00


 3/19/20 18:01 DC 3/19/20 17:10


125 MCG


 


 Diphenhydramine


 HCl


  (Benadryl)  25 mg  1X PRN  PRN  4/8/20 08:00


 4/9/20 07:59 DC  





 


 Etomidate


  (Amidate)  8 mg  1X  ONCE  3/23/20 08:30


 3/23/20 08:31 DC 3/23/20 08:33


8 MG


 


 Fentanyl Citrate


  (Fentanyl 2ml


 Vial)  50 mcg  PRN Q5MIN  PRN  4/6/20 07:00


 4/7/20 06:59 DC  





 


 Furosemide


  (Lasix)  20 mg  1X  ONCE  4/2/20 08:15


 4/2/20 08:16 DC 4/2/20 08:19


20 MG


 


 Heparin Sodium


  (Porcine)


  (Hep Lock Adult)  500 unit  STK-MED ONCE  4/7/20 09:29


 4/7/20 09:30 DC  





 


 Heparin Sodium


  (Porcine)


  (Heparin Sodium)  5,000 unit  Q12HR  4/3/20 21:00


    4/8/20 20:59


5,000 UNIT


 


 Hydromorphone HCl


  (Dilaudid)  1 mg  PRN Q3HRS  PRN  3/17/20 12:00


 3/31/20 00:25 DC 3/23/20 05:13


1 MG


 


 Info


  (CONTRAST GIVEN


 -- Rx MONITORING)  1 each  PRN DAILY  PRN  3/30/20 11:45


 4/1/20 11:44 DC  





 


 Info


  (Icu Electrolyte


 Protocol)  1 ea  CONT PRN  PRN  3/29/20 13:15


     





 


 Info


  (PHARMACY


 MONITORING -- do


 not chart)  1 each  PRN DAILY  PRN  4/9/20 08:15


   UNV  





 


 Info


  (Tpn Per


 Pharmacy)  1 each  PRN DAILY  PRN  3/18/20 12:30


   UNV  





 


 Insulin Human


 Lispro


  (HumaLOG)  0-9 UNITS  Q6HRS  3/16/20 09:30


    4/9/20 06:25


5 UNITS


 


 Insulin Human


 Regular


  (HumuLIN R VIAL)  5 unit  1X  ONCE  3/17/20 22:30


 3/17/20 22:31 DC 3/17/20 22:14


5 UNIT


 


 Iohexol


  (Omnipaque 240


 Mg/ml)  30 ml  1X  ONCE  3/30/20 11:30


 3/30/20 11:33 DC 3/30/20 11:30


30 ML


 


 Iohexol


  (Omnipaque 300


 Mg/ml)  60 ml  1X  ONCE  3/17/20 17:00


 3/17/20 17:01 DC 3/17/20 17:20


60 ML


 


 Iohexol


  (Omnipaque 350


 Mg/ml)  90 ml  1X  ONCE  3/16/20 03:30


 3/16/20 03:31 DC 3/16/20 03:25


90 ML


 


 Ketorolac


 Tromethamine


  (Toradol 30mg


 Vial)  30 mg  1X  ONCE  3/16/20 03:00


 3/16/20 03:01 DC 3/16/20 02:54


30 MG


 


 Lidocaine HCl


  (Buffered


 Lidocaine 1%)  6 ml  1X  ONCE  4/2/20 09:00


 4/2/20 09:06 DC 4/2/20 09:05


6 ML


 


 Lidocaine HCl


  (Glydo


  (Lidocaine) Jelly)  1 thomas  1X  ONCE  3/20/20 14:30


 3/20/20 14:31 DC 3/20/20 16:38


1 THOMAS


 


 Lidocaine HCl


  (Xylocaine-Mpf


 1% 2ml Vial)  2 ml  PRN 1X  PRN  4/6/20 07:00


 4/7/20 06:59 DC  





 


 Linezolid/Dextrose  300 ml @ 


 300 mls/hr  Q12HR  3/20/20 20:00


 3/27/20 07:50 DC 3/26/20 21:04


300 MLS/HR


 


 Lorazepam


  (Ativan Inj)  1 mg  PRN Q4HRS  PRN  3/19/20 09:00


    3/23/20 00:34


1 MG


 


 Magnesium Sulfate  50 ml @ 25


 mls/hr  PRN DAILY  PRN  4/5/20 09:15


     





 


 Meropenem 1 gm/


 Sodium Chloride  100 ml @ 


 200 mls/hr  Q8HRS  3/17/20 20:00


 3/18/20 08:48 DC 3/18/20 05:45


200 MLS/HR


 


 Meropenem 500 mg/


 Sodium Chloride  50 ml @ 


 100 mls/hr  Q12H  4/8/20 10:00


    4/8/20 21:51


100 MLS/HR


 


 Metoprolol


 Tartrate


  (Lopressor Vial)  5 mg  Q6HRS  3/17/20 10:15


 3/28/20 08:48 DC 3/26/20 00:12


5 MG


 


 Metronidazole  100 ml @ 


 100 mls/hr  Q6HRS  3/23/20 08:30


 4/8/20 09:58 DC 4/8/20 06:26


100 MLS/HR


 


 Micafungin Sodium


 100 mg/Dextrose  100 ml @ 


 100 mls/hr  Q24H  3/23/20 09:00


    4/8/20 14:28


100 MLS/HR


 


 Midazolam HCl


  (Versed)  5 mg  1X  ONCE  3/23/20 08:30


 3/23/20 08:31 DC  





 


 Midazolam HCl 100


 mg/Sodium Chloride  100 ml @ 7


 mls/hr  CONT  PRN  3/28/20 16:00


    4/8/20 15:35


7 MLS/HR


 


 Midazolam HCl 50


 mg/Sodium Chloride  50 ml @ 0


 mls/hr  CONT  PRN  3/23/20 08:15


 3/28/20 15:59 DC 3/26/20 22:39


7 MLS/HR


 


 Morphine Sulfate


  (Morphine


 Sulfate)  2 mg  PRN Q2HR  PRN  3/16/20 05:00


 3/17/20 14:15 DC 3/17/20 12:26


2 MG


 


 Multi-Ingred


 Cream/Lotion/Oil/


 Oint


  (Artificial


 Tears Eye


 Ointment)  1 thomas  PRN Q1HR  PRN  3/25/20 17:30


    4/3/20 11:05


1 THOMAS


 


 Norepinephrine


 Bitartrate 8 mg/


 Dextrose  258 ml @ 


 17.299 mls/


 hr  CONT  PRN  3/17/20 15:30


    4/8/20 13:45


20.5 MLS/HR


 


 Ondansetron HCl


  (Zofran)  4 mg  PRN Q6HRS  PRN  4/6/20 07:00


 4/7/20 06:59 DC  





 


 Pantoprazole


 Sodium


  (PROTONIX VIAL


 for IV PUSH)  40 mg  DAILYAC  3/16/20 11:30


    4/8/20 08:30


40 MG


 


 Piperacillin Sod/


 Tazobactam Sod


 4.5 gm/Sodium


 Chloride  100 ml @ 


 200 mls/hr  1X  ONCE  3/16/20 06:00


 3/16/20 06:29 DC 3/16/20 05:44


200 MLS/HR


 


 Potassium


 Chloride 15 meq/


 Bicarbonate


 Dialysis Soln w/


 out KCl  5,007.5 ml


  @ 1,000 mls/


 hr  Q5H1M  3/29/20 20:00


 4/2/20 13:08 DC 4/1/20 18:14


1,000 MLS/HR


 


 Potassium


 Chloride 20 meq/


 Bicarbonate


 Dialysis Soln w/


 out KCl  5,010 ml @ 


 1,000 mls/hr  Q5H1M  3/25/20 16:00


 3/29/20 19:59 DC 3/29/20 14:54


1,000 MLS/HR


 


 Potassium


 Chloride/Water  100 ml @ 


 100 mls/hr  Q1H  3/24/20 11:00


 3/24/20 12:59 DC 3/24/20 12:12


100 MLS/HR


 


 Potassium


 Phosphate 20 mmol/


 Sodium Chloride  106.6667


 ml @ 


 51.667 m...  1X  ONCE  3/25/20 13:00


 3/25/20 15:03 DC 3/25/20 12:51


51.667 MLS/HR


 


 Prochlorperazine


 Edisylate


  (Compazine)  5 mg  PACU PRN  PRN  4/6/20 07:00


 4/7/20 06:59 DC  





 


 Propofol  20 ml @ As


 Directed  STK-MED ONCE  4/6/20 11:07


 4/6/20 11:07 DC  





 


 Ringer's Solution  1,000 ml @ 


 30 mls/hr  Q24H  4/6/20 07:00


 4/6/20 18:59 DC  





 


 Sevoflurane


  (Ultane)  60 ml  STK-MED ONCE  4/6/20 12:46


 4/6/20 12:46 DC  





 


 Sodium


 Bicarbonate 50


 meq/Sodium


 Chloride  1,050 ml @ 


 75 mls/hr  Q14H  3/18/20 07:30


 3/23/20 10:28 DC 3/22/20 21:10


75 MLS/HR


 


 Sodium Chloride  1,000 ml @ 


 400 mls/hr  Q2H30M PRN  4/9/20 07:58


 4/9/20 19:57   





 


 Sodium Chloride


  (Normal Saline


 Flush)  10 ml  1X PRN  PRN  4/3/20 07:30


 4/4/20 07:29 DC  





 


 Sodium Chloride


 90 meq/Calcium


 Gluconate 10 meq/


 Multivitamins 10


 ml/Chromium/


 Copper/Manganese/


 Seleni/Zn 0.5 ml/


 Total Parenteral


 Nutrition/Amino


 Acids/Dextrose/


 Fat Emulsion


 Intravenous  1,512 ml @ 


 63 mls/hr  TPN  CONT  3/18/20 22:00


 3/19/20 21:59 DC 3/18/20 22:06


63 MLS/HR


 


 Sodium Chloride


 90 meq/Calcium


 Gluconate 10 meq/


 Multivitamins 10


 ml/Chromium/


 Copper/Manganese/


 Seleni/Zn 1 ml/


 Total Parenteral


 Nutrition/Amino


 Acids/Dextrose/


 Fat Emulsion


 Intravenous  55.005 ml


  @ 2.292


 mls/hr  TPN  CONT  3/18/20 22:00


 3/18/20 12:33 DC  





 


 Sodium Chloride


 90 meq/Magnesium


 Sulfate 10 meq/


 Calcium Gluconate


 20 meq/


 Multivitamins 10


 ml/Chromium/


 Copper/Manganese/


 Seleni/Zn 0.5 ml/


 Total Parenteral


 Nutrition/Amino


 Acids/Dextrose/


 Fat Emulsion


 Intravenous  1,512 ml @ 


 63 mls/hr  TPN  CONT  3/19/20 22:00


 3/20/20 21:59 DC 3/19/20 22:25


63 MLS/HR


 


 Sodium Chloride


 90 meq/Magnesium


 Sulfate 12 meq/


 Calcium Gluconate


 15 meq/


 Multivitamins 10


 ml/Chromium/


 Copper/Manganese/


 Seleni/Zn 0.5 ml/


 Insulin Human


 Regular 25 unit/


 Total Parenteral


 Nutrition/Amino


 Acids/Dextrose/


 Fat Emulsion


 Intravenous  1,400 ml @ 


 58.333 mls/


 hr  TPN  CONT  4/8/20 22:00


 4/9/20 21:59  4/8/20 21:41


58.333 MLS/HR


 


 Sodium Chloride


 90 meq/Potassium


 Chloride 15 meq/


 Magnesium Sulfate


 12 meq/Calcium


 Gluconate 15 meq/


 Multivitamins 10


 ml/Chromium/


 Copper/Manganese/


 Seleni/Zn 0.5 ml/


 Insulin Human


 Regular 25 unit/


 Total Parenteral


 Nutrition/Amino


 Acids/Dextrose/


 Fat Emulsion


 Intravenous  1,400 ml @ 


 58.333 mls/


 hr  TPN  CONT  4/7/20 22:00


 4/8/20 21:59 DC 4/7/20 22:13


58.333 MLS/HR


 


 Sodium Chloride


 90 meq/Potassium


 Chloride 15 meq/


 Potassium


 Phosphate 10 mmol/


 Magnesium Sulfate


 8 meq/Calcium


 Gluconate 15 meq/


 Multivitamins 10


 ml/Chromium/


 Copper/Manganese/


 Seleni/Zn 0.5 ml/


 Insulin Human


 Regular 25 unit/


 Total Parenteral


 Nutrition/Amino


 Acids/Dextrose/


 Fat Emulsion


 Intravenous  1,400 ml @ 


 58.333 mls/


 hr  TPN  CONT  4/5/20 22:00


 4/6/20 21:59 DC 4/5/20 21:20


58.333 MLS/HR


 


 Sodium Chloride


 90 meq/Potassium


 Chloride 15 meq/


 Potassium


 Phosphate 10 mmol/


 Magnesium Sulfate


 10 meq/Calcium


 Gluconate 20 meq/


 Multivitamins 10


 ml/Chromium/


 Copper/Manganese/


 Seleni/Zn 0.5 ml/


 Total Parenteral


 Nutrition/Amino


 Acids/Dextrose/


 Fat Emulsion


 Intravenous  1,400 ml @ 


 58.333 mls/


 hr  TPN  CONT  3/23/20 22:00


 3/24/20 21:59 DC 3/23/20 21:42


58.333 MLS/HR


 


 Sodium Chloride


 90 meq/Potassium


 Chloride 15 meq/


 Potassium


 Phosphate 10 mmol/


 Magnesium Sulfate


 12 meq/Calcium


 Gluconate 15 meq/


 Multivitamins 10


 ml/Chromium/


 Copper/Manganese/


 Seleni/Zn 0.5 ml/


 Insulin Human


 Regular 25 unit/


 Total Parenteral


 Nutrition/Amino


 Acids/Dextrose/


 Fat Emulsion


 Intravenous  1,400 ml @ 


 58.333 mls/


 hr  TPN  CONT  4/6/20 22:00


 4/7/20 21:59 DC 4/6/20 22:24


58.333 MLS/HR


 


 Sodium Chloride


 90 meq/Potassium


 Chloride 15 meq/


 Potassium


 Phosphate 15 mmol/


 Magnesium Sulfate


 10 meq/Calcium


 Gluconate 15 meq/


 Multivitamins 10


 ml/Chromium/


 Copper/Manganese/


 Seleni/Zn 0.5 ml/


 Total Parenteral


 Nutrition/Amino


 Acids/Dextrose/


 Fat Emulsion


 Intravenous  1,400 ml @ 


 58.333 mls/


 hr  TPN  CONT  3/24/20 22:00


 3/25/20 21:59 DC 3/24/20 22:17


58.333 MLS/HR


 


 Sodium Chloride


 90 meq/Potassium


 Chloride 15 meq/


 Potassium


 Phosphate 15 mmol/


 Magnesium Sulfate


 10 meq/Calcium


 Gluconate 20 meq/


 Multivitamins 10


 ml/Chromium/


 Copper/Manganese/


 Seleni/Zn 0.5 ml/


 Total Parenteral


 Nutrition/Amino


 Acids/Dextrose/


 Fat Emulsion


 Intravenous  1,200 ml @ 


 50 mls/hr  TPN  CONT  3/22/20 22:00


 3/22/20 14:17 DC  





 


 Sodium Chloride


 90 meq/Potassium


 Chloride 15 meq/


 Potassium


 Phosphate 18 mmol/


 Magnesium Sulfate


 8 meq/Calcium


 Gluconate 15 meq/


 Multivitamins 10


 ml/Chromium/


 Copper/Manganese/


 Seleni/Zn 0.5 ml/


 Insulin Human


 Regular 10 unit/


 Total Parenteral


 Nutrition/Amino


 Acids/Dextrose/


 Fat Emulsion


 Intravenous  1,400 ml @ 


 58.333 mls/


 hr  TPN  CONT  3/27/20 22:00


 3/28/20 21:59 DC 3/27/20 21:43


58.333 MLS/HR


 


 Sodium Chloride


 90 meq/Potassium


 Chloride 15 meq/


 Potassium


 Phosphate 18 mmol/


 Magnesium Sulfate


 8 meq/Calcium


 Gluconate 15 meq/


 Multivitamins 10


 ml/Chromium/


 Copper/Manganese/


 Seleni/Zn 0.5 ml/


 Insulin Human


 Regular 15 unit/


 Total Parenteral


 Nutrition/Amino


 Acids/Dextrose/


 Fat Emulsion


 Intravenous  1,400 ml @ 


 58.333 mls/


 hr  TPN  CONT  3/30/20 22:00


 3/31/20 21:59 DC 3/30/20 21:47


58.333 MLS/HR


 


 Sodium Chloride


 90 meq/Potassium


 Chloride 15 meq/


 Potassium


 Phosphate 18 mmol/


 Magnesium Sulfate


 8 meq/Calcium


 Gluconate 15 meq/


 Multivitamins 10


 ml/Chromium/


 Copper/Manganese/


 Seleni/Zn 0.5 ml/


 Insulin Human


 Regular 20 unit/


 Total Parenteral


 Nutrition/Amino


 Acids/Dextrose/


 Fat Emulsion


 Intravenous  1,400 ml @ 


 58.333 mls/


 hr  TPN  CONT  4/2/20 22:00


 4/3/20 21:59 DC 4/2/20 22:45


58.333 MLS/HR


 


 Sodium Chloride


 90 meq/Potassium


 Chloride 15 meq/


 Potassium


 Phosphate 18 mmol/


 Magnesium Sulfate


 8 meq/Calcium


 Gluconate 15 meq/


 Multivitamins 10


 ml/Chromium/


 Copper/Manganese/


 Seleni/Zn 0.5 ml/


 Total Parenteral


 Nutrition/Amino


 Acids/Dextrose/


 Fat Emulsion


 Intravenous  1,400 ml @ 


 58.333 mls/


 hr  TPN  CONT  3/26/20 22:00


 3/27/20 21:59 DC 3/26/20 22:00


58.333 MLS/HR


 


 Succinylcholine


 Chloride


  (Anectine)  120 mg  1X  ONCE  3/23/20 08:30


 3/23/20 08:31 DC 3/23/20 08:34


120 MG








Lab





Laboratory Tests








Test


 4/8/20


13:22 4/8/20


17:45 4/9/20


00:03 4/9/20


06:15


 


Glucose (Fingerstick)


 153 mg/dL


(70-99) 197 mg/dL


(70-99) 206 mg/dL


(70-99) 203 mg/dL


(70-99)


 


White Blood Count


 


 


 


 12.0 x10^3/uL


(4.0-11.0)


 


Red Blood Count


 


 


 


 2.30 x10^6/uL


(3.50-5.40)


 


Hemoglobin


 


 


 


 7.4 g/dL


(12.0-15.5)


 


Hematocrit


 


 


 


 22.9 %


(36.0-47.0)


 


Mean Corpuscular Volume


 


 


 


 100 fL


()


 


Mean Corpuscular Hemoglobin    32 pg (25-35) 


 


Mean Corpuscular Hemoglobin


Concent 


 


 


 32 g/dL


(31-37)


 


Red Cell Distribution Width


 


 


 


 20.1 %


(11.5-14.5)


 


Platelet Count


 


 


 


 328 x10^3/uL


(140-400)


 


Sodium Level


 


 


 


 137 mmol/L


(136-145)


 


Potassium Level


 


 


 


 3.7 mmol/L


(3.5-5.1)


 


Chloride Level


 


 


 


 99 mmol/L


()


 


Carbon Dioxide Level


 


 


 


 29 mmol/L


(21-32)


 


Anion Gap    9 (6-14) 


 


Blood Urea Nitrogen


 


 


 


 51 mg/dL


(7-20)


 


Creatinine


 


 


 


 2.2 mg/dL


(0.6-1.0)


 


Estimated GFR


(Cockcroft-Gault) 


 


 


 23.7 





 


Glucose Level


 


 


 


 211 mg/dL


(70-99)


 


Calcium Level


 


 


 


 8.7 mg/dL


(8.5-10.1)


 


Phosphorus Level


 


 


 


 3.6 mg/dL


(2.6-4.7)


 


Magnesium Level


 


 


 


 2.1 mg/dL


(1.8-2.4)


 


Albumin


 


 


 


 2.6 g/dL


(3.4-5.0)


 


Triglycerides Level


 


 


 


 436 mg/dL


(0-150)


 


Test


 4/9/20


08:00 


 


 





 


O2 Saturation 92 % (92-99)    


 


Arterial Blood pH


 7.40


(7.35-7.45) 


 


 





 


Arterial Blood pCO2 at


Patient Temp 45 mmHg


(35-46) 


 


 





 


Arterial Blood pO2 at Patient


Temp 69 mmHg


() 


 


 





 


Arterial Blood HCO3


 27 mmol/L


(21-28) 


 


 





 


Arterial Blood Base Excess


 2 mmol/L


(-3-3) 


 


 





 


FiO2 35    








Results


All relevant outside records, renal labs, imaging studies, telemetry/EKG's were 

reviewed.











KIMBERLY MYERS MD                 Apr 9, 2020 12:08

## 2020-04-09 NOTE — NUR
Pharmacy TPN Dosing Note



S: JESENIA RICH is a 49 year old F Currently receiving Central Continuous TPN started 
03/18/20



B:Pertinent PMH: Necrotizing pancreatitis

Height: 5 feet, 8 inches

Weight: 105 kg



Current diet: NPO 



LABS:

Sodium:    137 

Potassium: 3.7 

Chloride:  99 

Calcium:   8.7 

Corrected Calcium: 9.82 

Magnesium: 2.1 

CO2:       29 

SCr:       2.2 

Glucose:   197-211 

Albumin:   2.6 

AST:       47 

ALT:       20 



TPN FORMULA:

TPN TYPE:  Central Continuous

AMINO ACIDS:         125 gm

DEXTROSE:            195 gm

LIPIDS:              20 gm



SODIUM CHLORIDE:     90 mEq

POTASSIUM PHOSPHATE: 5 mmol

MAGNESIUM:           12 mEq

CALCIUM:             15 mEq

INSULIN:             30 units

MULTIPLE VITAMIN:    10 ml

TRACE ELEMENTS:      0.5 ml(s)



TPN PLAN:  

Triglycerides >400 - lipid content reduced to 20g/bag

5mmol Kphos added to TPN and insulin increased to 30 units/bag.





R: Change TPN per plan and ordered formula

Will monitor electrolytes, glucose, and tolerance to TPN.



 Maribell Vazquez [facility], 04/09/20 [id number]

## 2020-04-09 NOTE — NUR
RN spoke with Dr. Schaeffer and notified him of gauze being present when doing trach care. 
Gauze was brown in color and came from stoma area-- description given to MD. Removed with 
tweezers after Dr. Schaeffer stated it could be removed. Several pieces present. Trach care 
performed by [facility].

## 2020-04-09 NOTE — PDOC
SURGICAL PROGRESS NOTE


Subjective


seen during dialysis 


appears no acute changes


Vital Signs





Vital Signs








  Date Time  Temp Pulse Resp B/P (MAP) Pulse Ox O2 Delivery O2 Flow Rate FiO2


 


4/9/20 08:08     98 Ventilator  


 


4/9/20 08:00 99.8 95 18 94/53 (67)    





 99.8       








I&O











Intake and Output 


 


 4/9/20





 07:00


 


Intake Total 3304.78 ml


 


Output Total 570 ml


 


Balance 2734.78 ml


 


 


 


IV Total 3304.78 ml


 


Output Urine Total 220 ml


 


Gastric Drainage Total 350 ml








HEENT:  Other (trach intact )


Abdomen:  Other (distended, firm)


Labs





Laboratory Tests








Test


 4/7/20


18:03 4/7/20


23:40 4/8/20


06:10 4/8/20


06:17


 


Glucose (Fingerstick)


 134 mg/dL


(70-99) 167 mg/dL


(70-99) 


 182 mg/dL


(70-99)


 


Sodium Level


 


 


 135 mmol/L


(136-145) 





 


Potassium Level


 


 


 4.9 mmol/L


(3.5-5.1) 





 


Chloride Level


 


 


 102 mmol/L


() 





 


Carbon Dioxide Level


 


 


 24 mmol/L


(21-32) 





 


Anion Gap   9 (6-14)  


 


Blood Urea Nitrogen


 


 


 61 mg/dL


(7-20) 





 


Creatinine


 


 


 2.0 mg/dL


(0.6-1.0) 





 


Estimated GFR


(Cockcroft-Gault) 


 


 26.5 


 





 


Glucose Level


 


 


 187 mg/dL


(70-99) 





 


Calcium Level


 


 


 8.7 mg/dL


(8.5-10.1) 





 


Phosphorus Level


 


 


 4.8 mg/dL


(2.6-4.7) 





 


Magnesium Level


 


 


 1.9 mg/dL


(1.8-2.4) 





 


Albumin


 


 


 2.3 g/dL


(3.4-5.0) 





 


Test


 4/8/20


08:25 4/8/20


13:22 4/8/20


17:45 4/9/20


00:03


 


O2 Saturation 99 % (92-99)    


 


Arterial Blood pH


 7.34


(7.35-7.45) 


 


 





 


Arterial Blood pCO2 at


Patient Temp 36 mmHg


(35-46) 


 


 





 


Arterial Blood pO2 at Patient


Temp 242 mmHg


() 


 


 





 


Arterial Blood HCO3


 19 mmol/L


(21-28) 


 


 





 


Arterial Blood Base Excess


 -6 mmol/L


(-3-3) 


 


 





 


FiO2 40    


 


Glucose (Fingerstick)


 


 153 mg/dL


(70-99) 197 mg/dL


(70-99) 206 mg/dL


(70-99)


 


Test


 4/9/20


06:15 4/9/20


08:00 


 





 


White Blood Count


 12.0 x10^3/uL


(4.0-11.0) 


 


 





 


Red Blood Count


 2.30 x10^6/uL


(3.50-5.40) 


 


 





 


Hemoglobin


 7.4 g/dL


(12.0-15.5) 


 


 





 


Hematocrit


 22.9 %


(36.0-47.0) 


 


 





 


Mean Corpuscular Volume


 100 fL


() 


 


 





 


Mean Corpuscular Hemoglobin 32 pg (25-35)    


 


Mean Corpuscular Hemoglobin


Concent 32 g/dL


(31-37) 


 


 





 


Red Cell Distribution Width


 20.1 %


(11.5-14.5) 


 


 





 


Platelet Count


 328 x10^3/uL


(140-400) 


 


 





 


Sodium Level


 137 mmol/L


(136-145) 


 


 





 


Potassium Level


 3.7 mmol/L


(3.5-5.1) 


 


 





 


Chloride Level


 99 mmol/L


() 


 


 





 


Carbon Dioxide Level


 29 mmol/L


(21-32) 


 


 





 


Anion Gap 9 (6-14)    


 


Blood Urea Nitrogen


 51 mg/dL


(7-20) 


 


 





 


Creatinine


 2.2 mg/dL


(0.6-1.0) 


 


 





 


Estimated GFR


(Cockcroft-Gault) 23.7 


 


 


 





 


Glucose Level


 211 mg/dL


(70-99) 


 


 





 


Glucose (Fingerstick)


 203 mg/dL


(70-99) 


 


 





 


Calcium Level


 8.7 mg/dL


(8.5-10.1) 


 


 





 


Phosphorus Level


 3.6 mg/dL


(2.6-4.7) 


 


 





 


Magnesium Level


 2.1 mg/dL


(1.8-2.4) 


 


 





 


Albumin


 2.6 g/dL


(3.4-5.0) 


 


 





 


O2 Saturation  92 % (92-99)   


 


Arterial Blood pH


 


 7.40


(7.35-7.45) 


 





 


Arterial Blood pCO2 at


Patient Temp 


 45 mmHg


(35-46) 


 





 


Arterial Blood pO2 at Patient


Temp 


 69 mmHg


() 


 





 


Arterial Blood HCO3


 


 27 mmol/L


(21-28) 


 





 


Arterial Blood Base Excess


 


 2 mmol/L


(-3-3) 


 





 


FiO2  35   








Laboratory Tests








Test


 4/8/20


13:22 4/8/20


17:45 4/9/20


00:03 4/9/20


06:15


 


Glucose (Fingerstick)


 153 mg/dL


(70-99) 197 mg/dL


(70-99) 206 mg/dL


(70-99) 203 mg/dL


(70-99)


 


White Blood Count


 


 


 


 12.0 x10^3/uL


(4.0-11.0)


 


Red Blood Count


 


 


 


 2.30 x10^6/uL


(3.50-5.40)


 


Hemoglobin


 


 


 


 7.4 g/dL


(12.0-15.5)


 


Hematocrit


 


 


 


 22.9 %


(36.0-47.0)


 


Mean Corpuscular Volume


 


 


 


 100 fL


()


 


Mean Corpuscular Hemoglobin    32 pg (25-35) 


 


Mean Corpuscular Hemoglobin


Concent 


 


 


 32 g/dL


(31-37)


 


Red Cell Distribution Width


 


 


 


 20.1 %


(11.5-14.5)


 


Platelet Count


 


 


 


 328 x10^3/uL


(140-400)


 


Sodium Level


 


 


 


 137 mmol/L


(136-145)


 


Potassium Level


 


 


 


 3.7 mmol/L


(3.5-5.1)


 


Chloride Level


 


 


 


 99 mmol/L


()


 


Carbon Dioxide Level


 


 


 


 29 mmol/L


(21-32)


 


Anion Gap    9 (6-14) 


 


Blood Urea Nitrogen


 


 


 


 51 mg/dL


(7-20)


 


Creatinine


 


 


 


 2.2 mg/dL


(0.6-1.0)


 


Estimated GFR


(Cockcroft-Gault) 


 


 


 23.7 





 


Glucose Level


 


 


 


 211 mg/dL


(70-99)


 


Calcium Level


 


 


 


 8.7 mg/dL


(8.5-10.1)


 


Phosphorus Level


 


 


 


 3.6 mg/dL


(2.6-4.7)


 


Magnesium Level


 


 


 


 2.1 mg/dL


(1.8-2.4)


 


Albumin


 


 


 


 2.6 g/dL


(3.4-5.0)


 


Test


 4/9/20


08:00 


 


 





 


O2 Saturation 92 % (92-99)    


 


Arterial Blood pH


 7.40


(7.35-7.45) 


 


 





 


Arterial Blood pCO2 at


Patient Temp 45 mmHg


(35-46) 


 


 





 


Arterial Blood pO2 at Patient


Temp 69 mmHg


() 


 


 





 


Arterial Blood HCO3


 27 mmol/L


(21-28) 


 


 





 


Arterial Blood Base Excess


 2 mmol/L


(-3-3) 


 


 





 


FiO2 35    








Problem List


Problems


Medical Problems:


(1) Acute pancreatitis


Status: Acute  





(2) Cholelithiasis


Status: Acute  








Assessment/Plan


supportive care











SAURAV NASH             Apr 9, 2020 09:47

## 2020-04-09 NOTE — RAD
EXAM: CHEST ONE VIEW.

 

HISTORY: Ventilated, respiratory failure.

 

COMPARISON: 04/07/2020.

 

FINDINGS: A frontal view of the chest is obtained. A tracheostomy 

appliance is in expected position. A right internal jugular hemodialysis 

catheter has its tip in the superior cavoatrial junction. A left 

subclavian central venous catheter has its tip in the superior cavoatrial 

junction. A right arm PICC line has its tip in the superior vena cava. A 

nasogastric tube has its tip below the inferior margin of the view.

 

There are moderate right and small left pleural effusions. Airspace 

opacities in the bases are consistent with atelectasis and mild to 

moderate pulmonary edema. There is no pneumothorax. The heart is not 

enlarged.

 

IMPRESSION: 

1. Moderate right and small left pleural effusions and mild to moderate 

pulmonary edema appear stable.

 

Electronically signed by: ASIF Tripathi MD (4/9/2020 4:40 AM) [facility]

## 2020-04-09 NOTE — PDOC
Objective:


Vital Signs:





                                   Vital Signs








  Date Time  Temp Pulse Resp B/P (MAP) Pulse Ox O2 Delivery O2 Flow Rate FiO2


 


4/9/20 10:26     98   


 


4/9/20 08:08      Ventilator  


 


4/9/20 08:00 99.8 95 18 94/53 (67)    





 99.8       








Labs:





Laboratory Tests








Test


 4/8/20


13:22 4/8/20


17:45 4/9/20


00:03 4/9/20


06:15


 


Glucose (Fingerstick) 153 mg/dL  197 mg/dL  206 mg/dL  203 mg/dL 


 


White Blood Count    12.0 x10^3/uL 


 


Red Blood Count    2.30 x10^6/uL 


 


Hemoglobin    7.4 g/dL 


 


Hematocrit    22.9 % 


 


Mean Corpuscular Volume    100 fL 


 


Mean Corpuscular Hemoglobin    32 pg 


 


Mean Corpuscular Hemoglobin


Concent 


 


 


 32 g/dL 





 


Red Cell Distribution Width    20.1 % 


 


Platelet Count    328 x10^3/uL 


 


Sodium Level    137 mmol/L 


 


Potassium Level    3.7 mmol/L 


 


Chloride Level    99 mmol/L 


 


Carbon Dioxide Level    29 mmol/L 


 


Anion Gap    9 


 


Blood Urea Nitrogen    51 mg/dL 


 


Creatinine    2.2 mg/dL 


 


Estimated GFR


(Cockcroft-Gault) 


 


 


 23.7 





 


Glucose Level    211 mg/dL 


 


Calcium Level    8.7 mg/dL 


 


Phosphorus Level    3.6 mg/dL 


 


Magnesium Level    2.1 mg/dL 


 


Albumin    2.6 g/dL 


 


Test


 4/9/20


08:00 


 


 





 


O2 Saturation 92 %    


 


Arterial Blood pH 7.40    


 


Arterial Blood pCO2 at


Patient Temp 45 mmHg 


 


 


 





 


Arterial Blood pO2 at Patient


Temp 69 mmHg 


 


 


 





 


Arterial Blood HCO3 27 mmol/L    


 


Arterial Blood Base Excess 2 mmol/L    


 


FiO2 35    











PE:





GEN: dialyzing


LUNGS: trach/vent, clear


HEART: RRR


ABD: quiet, distended, NG bilious, rectal tube


NEURO/PSYCH: sedated





A/P:


Gallstone pancreatitis, MOSF





--


Continue same.





Hemodynamically unstable?:  No


Is patient in severe pain?:  No


Is NPO status required?:  Yes











RAGHAVENDRA MURCIA          Apr 9, 2020 11:07

## 2020-04-09 NOTE — RAD
CT abdomen pelvis without contrast.

 

HISTORY: Follow-up necrotizing pancreatitis.

 

CT scan of the abdomen and pelvis was done without contrast. Comparison is

made with a study from March 30. There are moderate bilateral effusions. 

There is atelectasis in both lung bases. There is right middle lobe 

atelectasis. There are gallstones in the gallbladder. There is a cyst in 

the liver. No other liver lesion is noted. Spleen is normal. There is an 

NG tube in the stomach. There is necrotizing pancreatitis with the fluid 

and phlegmon at the pancreas. There is increased ascites in the abdomen 

and pelvis. There is no bowel obstruction. There is edema in the abdominal

wall. There is mildly dilated collecting system of the right kidney. The 

ureters not definitely dilated. The NG tube extends into the duodenum.

 

IMPRESSION:

1. Increased ascites.

2. 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

at the pancreas

3. Cholelithiasis with thickening of the gallbladder wall.

4. Persistent pleural effusions and atelectasis in the lung bases.

 

PQRS Compliance Statement:

 

One or more of the following individualized dose reduction techniques were

utilized for this examination:  

1. Automated exposure control  

2. Adjustment of the mA and/or kV according to patient size  

3. Use of iterative reconstruction technique

 

Electronically signed by: Sourav Frias MD (4/9/2020 2:46 PM) [facility] Information: Selecting Yes will display possible errors in your note based on the variables you have selected. This validation is only offered as a suggestion for you. PLEASE NOTE THAT THE VALIDATION TEXT WILL BE REMOVED WHEN YOU FINALIZE YOUR NOTE. IF YOU WANT TO FAX A PRELIMINARY NOTE YOU WILL NEED TO TOGGLE THIS TO 'NO' IF YOU DO NOT WANT IT IN YOUR FAXED NOTE.

## 2020-04-09 NOTE — PDOC
PROGRESS NOTES


Assessment


Assessment


Respiratory failure.


Seizure.


Metabolic encephalopathy.


Fever 102.7 degree, recurrent fever 100.6 degree on 4/8/20.


Metabolic acidosis.


Diffuse pulmonary infiltrate.


Pleural effusion.


Pancreatitis


Gallstone.


Leukocytosis.


Lymphopenia.


Electrolytes imbalances.


Hyperglycemia.


DM.


HTN.


HLD.


Anemia.


Abnormal CXR.


Obesity.


Covid-19 still suspected.





RECOMMENDATIONS/PLAN:


Continue life support in CCU at the present time.


Keppra if has further seizures.


Treat medical diseases.


Suggested repeat Covid-19 lab test.


OT/PT.





EEG: No seizure activity.





OBJECTIVE:


Fever 100.6 degree before and now T 99.8 degree..


4/9/20: No seizures reported over night.





Past Medical History


Cardiovascular:  HTN, Hyperlipidemia


Endocrine:  Diabetes





Family History


Unobtainable.





Social HistoryU


not obtainable





Allergies


Coded Allergies:  


Codeine (Verified  Allergy, Intermediate, rash, 3/16/20)





ROS


Unobtainable.





NEUROLOGICAL  EXAMINATION:


Decreased responsiveness.


Not oriented to time, place and person.


PERRL.


EOMI not active.


CN: no acute focal findings.


No focalized findings per reports.


Patient went to dialysis.





Objective


Objective





Vital Signs








  Date Time  Temp Pulse Resp B/P (MAP) Pulse Ox O2 Delivery O2 Flow Rate FiO2


 


4/9/20 12:09     100 Ventilator  


 


4/9/20 08:00 99.8 95 18 94/53 (67)    





 99.8       














Intake and Output 


 


 4/9/20





 07:00


 


Intake Total 3304.78 ml


 


Output Total 570 ml


 


Balance 2734.78 ml


 


 


 


IV Total 3304.78 ml


 


Output Urine Total 220 ml


 


Gastric Drainage Total 350 ml











Vitals Signs


Vitals





                          VS - Last 72 Hours, by Label








  Date Time  Temp Pulse Resp B/P (MAP) Pulse Ox O2 Delivery O2 Flow Rate FiO2


 


4/9/20 12:09     100 Ventilator  


 


4/9/20 10:26     98   


 


4/9/20 08:08     98 Ventilator  


 


4/9/20 08:00 99.8 95 18 94/53 (67) 97 Ventilator  





 99.8       


 


4/9/20 08:00      Mechanical Ventilator  


 


4/9/20 07:00  96 18 108/59 (75) 98 Ventilator  


 


4/9/20 06:00  90 18 100/54 (69) 98 Ventilator  


 


4/9/20 05:00  97 18 99/57 (71) 97 Ventilator  


 


4/9/20 04:00     96 Ventilator  


 


4/9/20 04:00      Mechanical Ventilator  


 


4/9/20 04:00 98.8 100 18 105/71 (82) 96 Ventilator  





 98.8       


 


4/9/20 03:00  100 18 113/68 (83) 97 Ventilator  


 


4/9/20 02:00  102 19 117/66 (83) 96 Ventilator  


 


4/9/20 01:00  101 18 123/70 (87) 97 Ventilator  


 


4/9/20 00:00 99.8 101 18 111/65 (80) 96 Ventilator  





 99.8       


 


4/9/20 00:00      Mechanical Ventilator  


 


4/8/20 23:15     96 Ventilator  


 


4/8/20 23:00  98 18 104/67 (79) 95 Ventilator  


 


4/8/20 22:00  97 18 104/70 (81) 96 Ventilator  


 


4/8/20 21:00  96 18 113/65 (81) 97 Ventilator  


 


4/8/20 20:10     95 Ventilator  


 


4/8/20 20:00      Mechanical Ventilator  


 


4/8/20 20:00 98.3 98 18 94/50 (65) 95 Ventilator  





 98.3       


 


4/8/20 19:00  98 18 102/49 (66) 95 Ventilator  


 


4/8/20 18:00  102 18 106/63 (77) 97 Ventilator  


 


4/8/20 17:00  101 18 83/48 (60) 98 Ventilator  


 


4/8/20 16:00      Mechanical Ventilator  


 


4/8/20 16:00 99.0 96 18 93/55 (68) 97 Ventilator  





 99.0       


 


4/8/20 15:53     97 Ventilator  


 


4/8/20 15:45  96  98/56 (70)    


 


4/8/20 15:30  96 18 97/55 (69) 98 Ventilator  


 


4/8/20 15:00  101 18 122/68 (86) 98 Ventilator  


 


4/8/20 14:30  108 18 132/87 (102) 98 Ventilator  


 


4/8/20 14:15  108 18 120/72 (88) 98 Ventilator  


 


4/8/20 14:01   18  98 Ventilator  


 


4/8/20 14:00  108 18 94/55 (68) 98 Ventilator  


 


4/8/20 13:45  108 18 114/66 (82) 98 Ventilator  


 


4/8/20 13:30  100 18 204/110 (141) 98 Ventilator  


 


4/8/20 13:29   18  99 Ventilator  


 


4/8/20 13:15  112  199/93 (128)    


 


4/8/20 13:00 100.8 102 18 137/87 (104) 99 Ventilator  





 100.8       


 


4/8/20 12:00      Mechanical Ventilator  


 


4/8/20 11:54     100 Ventilator  


 


4/8/20 08:00     99 Ventilator  


 


4/8/20 08:00      Mechanical Ventilator  


 


4/8/20 08:00 100.6 106 18 116/62 (80) 98 Ventilator  





 100.6       


 


4/8/20 07:00  104 18 112/62 (79) 99 Ventilator  











Laboratory


Laboratory





Laboratory Tests








Test


 4/8/20


13:22 4/8/20


17:45 4/9/20


00:03 4/9/20


06:15


 


Glucose (Fingerstick)


 153 mg/dL


(70-99) 197 mg/dL


(70-99) 206 mg/dL


(70-99) 203 mg/dL


(70-99)


 


White Blood Count


 


 


 


 12.0 x10^3/uL


(4.0-11.0)


 


Red Blood Count


 


 


 


 2.30 x10^6/uL


(3.50-5.40)


 


Hemoglobin


 


 


 


 7.4 g/dL


(12.0-15.5)


 


Hematocrit


 


 


 


 22.9 %


(36.0-47.0)


 


Mean Corpuscular Volume


 


 


 


 100 fL


()


 


Mean Corpuscular Hemoglobin    32 pg (25-35) 


 


Mean Corpuscular Hemoglobin


Concent 


 


 


 32 g/dL


(31-37)


 


Red Cell Distribution Width


 


 


 


 20.1 %


(11.5-14.5)


 


Platelet Count


 


 


 


 328 x10^3/uL


(140-400)


 


Sodium Level


 


 


 


 137 mmol/L


(136-145)


 


Potassium Level


 


 


 


 3.7 mmol/L


(3.5-5.1)


 


Chloride Level


 


 


 


 99 mmol/L


()


 


Carbon Dioxide Level


 


 


 


 29 mmol/L


(21-32)


 


Anion Gap    9 (6-14) 


 


Blood Urea Nitrogen


 


 


 


 51 mg/dL


(7-20)


 


Creatinine


 


 


 


 2.2 mg/dL


(0.6-1.0)


 


Estimated GFR


(Cockcroft-Gault) 


 


 


 23.7 





 


Glucose Level


 


 


 


 211 mg/dL


(70-99)


 


Calcium Level


 


 


 


 8.7 mg/dL


(8.5-10.1)


 


Phosphorus Level


 


 


 


 3.6 mg/dL


(2.6-4.7)


 


Magnesium Level


 


 


 


 2.1 mg/dL


(1.8-2.4)


 


Albumin


 


 


 


 2.6 g/dL


(3.4-5.0)


 


Triglycerides Level


 


 


 


 436 mg/dL


(0-150)


 


Test


 4/9/20


08:00 


 


 





 


O2 Saturation 92 % (92-99)    


 


Arterial Blood pH


 7.40


(7.35-7.45) 


 


 





 


Arterial Blood pCO2 at


Patient Temp 45 mmHg


(35-46) 


 


 





 


Arterial Blood pO2 at Patient


Temp 69 mmHg


() 


 


 





 


Arterial Blood HCO3


 27 mmol/L


(21-28) 


 


 





 


Arterial Blood Base Excess


 2 mmol/L


(-3-3) 


 


 





 


FiO2 35    








Microbiology


4/5/20 Blood Culture - Preliminary, Resulted


         NO GROWTH AFTER 4 DAYS


4/4/20 Urine Culture - Final, Complete


         


4/4/20 Urine Culture Result 1 ([facility]) - Final, Complete





Medication


Medications





Current Medications


Albumin Human 200 ml @  200 mls/hr 1X PRN  PRN IV Hypotension Last administered 

on 4/9/20at 09:30;  Start 4/9/20 at 08:00;  Stop 4/9/20 at 13:59


Info (PHARMACY MONITORING -- do not chart) 1 each PRN DAILY  PRN MC SEE 

COMMENTS;  Start 4/9/20 at 08:00


Info (PHARMACY MONITORING -- do not chart) 1 each PRN DAILY  PRN MC SEE 

COMMENTS;  Start 4/9/20 at 08:15;  Status UNV


Sodium Chloride 1,000 ml @  400 mls/hr Q2H30M PRN IV PATENCY;  Start 4/9/20 at 0

7:58;  Stop 4/9/20 at 19:57


Sodium Chloride 1,000 ml @  1,000 mls/hr Q1H PRN IV hypotension;  Start 4/9/20 

at 07:58;  Stop 4/9/20 at 13:57


Sodium Chloride 90 meq/Magnesium Sulfate 12 meq/ Calcium Gluconate 15 meq/ 

Multivitamins 10 ml/Chromium/ Copper/Manganese/ Seleni/Zn 0.5 ml/ Insulin Human 

Regular 25 unit/ Total Parenteral Nutrition/Amino Acids/Dextrose/ Fat Emulsion 

Intravenous 1,400 ml @  58.333 mls/ hr TPN  CONT IV  Last administered on 

4/8/20at 21:41;  Start 4/8/20 at 22:00;  Stop 4/9/20 at 21:59





Comment


Review of Relevant


I have reviewed the following items josy (where applicable) has been applied.











DORYS LANDA MD                  Apr 9, 2020 12:28

## 2020-04-10 VITALS — SYSTOLIC BLOOD PRESSURE: 107 MMHG | DIASTOLIC BLOOD PRESSURE: 55 MMHG

## 2020-04-10 VITALS — DIASTOLIC BLOOD PRESSURE: 44 MMHG | SYSTOLIC BLOOD PRESSURE: 89 MMHG

## 2020-04-10 VITALS — SYSTOLIC BLOOD PRESSURE: 116 MMHG | DIASTOLIC BLOOD PRESSURE: 81 MMHG

## 2020-04-10 VITALS — DIASTOLIC BLOOD PRESSURE: 59 MMHG | SYSTOLIC BLOOD PRESSURE: 109 MMHG

## 2020-04-10 VITALS — DIASTOLIC BLOOD PRESSURE: 43 MMHG | SYSTOLIC BLOOD PRESSURE: 82 MMHG

## 2020-04-10 VITALS — SYSTOLIC BLOOD PRESSURE: 120 MMHG | DIASTOLIC BLOOD PRESSURE: 58 MMHG

## 2020-04-10 VITALS — SYSTOLIC BLOOD PRESSURE: 101 MMHG | DIASTOLIC BLOOD PRESSURE: 58 MMHG

## 2020-04-10 VITALS — SYSTOLIC BLOOD PRESSURE: 94 MMHG | DIASTOLIC BLOOD PRESSURE: 51 MMHG

## 2020-04-10 VITALS — SYSTOLIC BLOOD PRESSURE: 116 MMHG | DIASTOLIC BLOOD PRESSURE: 57 MMHG

## 2020-04-10 VITALS — DIASTOLIC BLOOD PRESSURE: 55 MMHG | SYSTOLIC BLOOD PRESSURE: 107 MMHG

## 2020-04-10 VITALS — SYSTOLIC BLOOD PRESSURE: 111 MMHG | DIASTOLIC BLOOD PRESSURE: 57 MMHG

## 2020-04-10 VITALS — DIASTOLIC BLOOD PRESSURE: 36 MMHG | SYSTOLIC BLOOD PRESSURE: 71 MMHG

## 2020-04-10 VITALS — SYSTOLIC BLOOD PRESSURE: 106 MMHG | DIASTOLIC BLOOD PRESSURE: 52 MMHG

## 2020-04-10 VITALS — SYSTOLIC BLOOD PRESSURE: 122 MMHG | DIASTOLIC BLOOD PRESSURE: 55 MMHG

## 2020-04-10 VITALS — SYSTOLIC BLOOD PRESSURE: 118 MMHG | DIASTOLIC BLOOD PRESSURE: 57 MMHG

## 2020-04-10 VITALS — DIASTOLIC BLOOD PRESSURE: 47 MMHG | SYSTOLIC BLOOD PRESSURE: 98 MMHG

## 2020-04-10 VITALS — DIASTOLIC BLOOD PRESSURE: 58 MMHG | SYSTOLIC BLOOD PRESSURE: 110 MMHG

## 2020-04-10 VITALS — SYSTOLIC BLOOD PRESSURE: 96 MMHG | DIASTOLIC BLOOD PRESSURE: 47 MMHG

## 2020-04-10 VITALS — SYSTOLIC BLOOD PRESSURE: 119 MMHG | DIASTOLIC BLOOD PRESSURE: 52 MMHG

## 2020-04-10 VITALS — DIASTOLIC BLOOD PRESSURE: 52 MMHG | SYSTOLIC BLOOD PRESSURE: 118 MMHG

## 2020-04-10 VITALS — DIASTOLIC BLOOD PRESSURE: 59 MMHG | SYSTOLIC BLOOD PRESSURE: 106 MMHG

## 2020-04-10 VITALS — SYSTOLIC BLOOD PRESSURE: 111 MMHG | DIASTOLIC BLOOD PRESSURE: 59 MMHG

## 2020-04-10 VITALS — SYSTOLIC BLOOD PRESSURE: 109 MMHG | DIASTOLIC BLOOD PRESSURE: 57 MMHG

## 2020-04-10 VITALS — SYSTOLIC BLOOD PRESSURE: 121 MMHG | DIASTOLIC BLOOD PRESSURE: 61 MMHG

## 2020-04-10 VITALS — SYSTOLIC BLOOD PRESSURE: 113 MMHG | DIASTOLIC BLOOD PRESSURE: 57 MMHG

## 2020-04-10 VITALS — DIASTOLIC BLOOD PRESSURE: 51 MMHG | SYSTOLIC BLOOD PRESSURE: 107 MMHG

## 2020-04-10 VITALS — DIASTOLIC BLOOD PRESSURE: 69 MMHG | SYSTOLIC BLOOD PRESSURE: 149 MMHG

## 2020-04-10 VITALS — DIASTOLIC BLOOD PRESSURE: 54 MMHG | SYSTOLIC BLOOD PRESSURE: 129 MMHG

## 2020-04-10 LAB
ALBUMIN SERPL-MCNC: 3 G/DL (ref 3.4–5)
ANION GAP SERPL CALC-SCNC: 8 MMOL/L (ref 6–14)
BASE EXCESS ABG: 4 MMOL/L (ref -3–3)
BASOPHILS # BLD AUTO: 0.1 X10^3/UL (ref 0–0.2)
BASOPHILS NFR BLD: 1 % (ref 0–3)
BUN SERPL-MCNC: 49 MG/DL (ref 7–20)
CALCIUM SERPL-MCNC: 8.4 MG/DL (ref 8.5–10.1)
CHLORIDE SERPL-SCNC: 99 MMOL/L (ref 98–107)
CO2 SERPL-SCNC: 30 MMOL/L (ref 21–32)
CREAT SERPL-MCNC: 2.3 MG/DL (ref 0.6–1)
EOSINOPHIL NFR BLD: 1.1 X10^3/UL (ref 0–0.7)
EOSINOPHIL NFR BLD: 9 % (ref 0–3)
ERYTHROCYTE [DISTWIDTH] IN BLOOD BY AUTOMATED COUNT: 19.5 % (ref 11.5–14.5)
GFR SERPLBLD BASED ON 1.73 SQ M-ARVRAT: 22.5 ML/MIN
GLUCOSE SERPL-MCNC: 209 MG/DL (ref 70–99)
HCO3 BLDA-SCNC: 28 MMOL/L (ref 21–28)
HCT VFR BLD CALC: 23.1 % (ref 36–47)
HGB BLD-MCNC: 7.4 G/DL (ref 12–15.5)
LYMPHOCYTES # BLD: 2.3 X10^3/UL (ref 1–4.8)
LYMPHOCYTES NFR BLD AUTO: 17 % (ref 24–48)
MCH RBC QN AUTO: 32 PG (ref 25–35)
MCHC RBC AUTO-ENTMCNC: 32 G/DL (ref 31–37)
MCV RBC AUTO: 100 FL (ref 79–100)
MONO #: 1.7 X10^3/UL (ref 0–1.1)
MONOCYTES NFR BLD: 13 % (ref 0–9)
NEUT #: 7.9 X10^3/UL (ref 1.8–7.7)
NEUTROPHILS NFR BLD AUTO: 60 % (ref 31–73)
PCO2 BLDA: 44 MMHG (ref 35–46)
PHOSPHATE SERPL-MCNC: 2.6 MG/DL (ref 2.6–4.7)
PLATELET # BLD AUTO: 341 X10^3/UL (ref 140–400)
PO2 BLDA: 81 MMHG (ref 75–108)
POTASSIUM SERPL-SCNC: 3.8 MMOL/L (ref 3.5–5.1)
RBC # BLD AUTO: 2.32 X10^6/UL (ref 3.5–5.4)
SAO2 % BLDA: 95 % (ref 92–99)
SODIUM SERPL-SCNC: 137 MMOL/L (ref 136–145)
WBC # BLD AUTO: 13.1 X10^3/UL (ref 4–11)

## 2020-04-10 RX ADMIN — INSULIN LISPRO SCH UNITS: 100 INJECTION, SOLUTION INTRAVENOUS; SUBCUTANEOUS at 07:52

## 2020-04-10 RX ADMIN — AMIODARONE HYDROCHLORIDE PRN MLS/HR: 50 INJECTION, SOLUTION INTRAVENOUS at 13:31

## 2020-04-10 RX ADMIN — PANTOPRAZOLE SODIUM SCH MG: 40 INJECTION, POWDER, FOR SOLUTION INTRAVENOUS at 08:05

## 2020-04-10 RX ADMIN — INSULIN LISPRO SCH UNITS: 100 INJECTION, SOLUTION INTRAVENOUS; SUBCUTANEOUS at 18:14

## 2020-04-10 RX ADMIN — HEPARIN SODIUM SCH UNIT: 5000 INJECTION, SOLUTION INTRAVENOUS; SUBCUTANEOUS at 21:50

## 2020-04-10 RX ADMIN — DAPTOMYCIN SCH MLS/HR: 500 INJECTION, POWDER, LYOPHILIZED, FOR SOLUTION INTRAVENOUS at 09:57

## 2020-04-10 RX ADMIN — DEXMEDETOMIDINE HYDROCHLORIDE PRN MLS/HR: 100 INJECTION, SOLUTION, CONCENTRATE INTRAVENOUS at 13:30

## 2020-04-10 RX ADMIN — DEXMEDETOMIDINE HYDROCHLORIDE PRN MLS/HR: 100 INJECTION, SOLUTION, CONCENTRATE INTRAVENOUS at 21:48

## 2020-04-10 RX ADMIN — DEXMEDETOMIDINE HYDROCHLORIDE PRN MLS/HR: 100 INJECTION, SOLUTION, CONCENTRATE INTRAVENOUS at 08:26

## 2020-04-10 RX ADMIN — INSULIN LISPRO SCH UNITS: 100 INJECTION, SOLUTION INTRAVENOUS; SUBCUTANEOUS at 00:24

## 2020-04-10 RX ADMIN — HEPARIN SODIUM SCH UNIT: 5000 INJECTION, SOLUTION INTRAVENOUS; SUBCUTANEOUS at 09:00

## 2020-04-10 RX ADMIN — MEROPENEM SCH MLS/HR: 500 INJECTION, POWDER, FOR SOLUTION INTRAVENOUS at 11:00

## 2020-04-10 RX ADMIN — DEXMEDETOMIDINE HYDROCHLORIDE PRN MLS/HR: 100 INJECTION, SOLUTION, CONCENTRATE INTRAVENOUS at 01:00

## 2020-04-10 RX ADMIN — Medication PRN EACH: at 11:58

## 2020-04-10 RX ADMIN — MEROPENEM SCH MLS/HR: 500 INJECTION, POWDER, FOR SOLUTION INTRAVENOUS at 21:49

## 2020-04-10 RX ADMIN — DEXMEDETOMIDINE HYDROCHLORIDE PRN MLS/HR: 100 INJECTION, SOLUTION, CONCENTRATE INTRAVENOUS at 05:15

## 2020-04-10 RX ADMIN — INSULIN LISPRO SCH UNITS: 100 INJECTION, SOLUTION INTRAVENOUS; SUBCUTANEOUS at 12:35

## 2020-04-10 RX ADMIN — DEXTROSE SCH MLS/HR: 50 INJECTION, SOLUTION INTRAVENOUS at 08:05

## 2020-04-10 NOTE — PDOC
PULMONARY PROGRESS NOTES


Subjective


Patient intubated on 3/23 , sedated


Patient currently on assist control ventilator


Vitals





Vital Signs








  Date Time  Temp Pulse Resp B/P (MAP) Pulse Ox O2 Delivery O2 Flow Rate FiO2


 


4/10/20 16:27     99 Ventilator  


 


4/10/20 15:59  96 19 98/47 (64)    


 


4/10/20 14:39 98.6       





 98.6       








Comments


intubated/sedated


Lungs:  Other (dimished in BLL)


Cardiovascular:  S1, S2


Abdomen:  Soft, Non-tender


Extremities:  Other (+3 generalized edema )


Skin:  Warm, Dry


Labs





Laboratory Tests








Test


 4/8/20


17:45 4/9/20


00:03 4/9/20


06:15 4/9/20


08:00


 


Glucose (Fingerstick)


 197 mg/dL


(70-99) 206 mg/dL


(70-99) 203 mg/dL


(70-99) 





 


White Blood Count


 


 


 12.0 x10^3/uL


(4.0-11.0) 





 


Red Blood Count


 


 


 2.30 x10^6/uL


(3.50-5.40) 





 


Hemoglobin


 


 


 7.4 g/dL


(12.0-15.5) 





 


Hematocrit


 


 


 22.9 %


(36.0-47.0) 





 


Mean Corpuscular Volume


 


 


 100 fL


() 





 


Mean Corpuscular Hemoglobin   32 pg (25-35)  


 


Mean Corpuscular Hemoglobin


Concent 


 


 32 g/dL


(31-37) 





 


Red Cell Distribution Width


 


 


 20.1 %


(11.5-14.5) 





 


Platelet Count


 


 


 328 x10^3/uL


(140-400) 





 


Sodium Level


 


 


 137 mmol/L


(136-145) 





 


Potassium Level


 


 


 3.7 mmol/L


(3.5-5.1) 





 


Chloride Level


 


 


 99 mmol/L


() 





 


Carbon Dioxide Level


 


 


 29 mmol/L


(21-32) 





 


Anion Gap   9 (6-14)  


 


Blood Urea Nitrogen


 


 


 51 mg/dL


(7-20) 





 


Creatinine


 


 


 2.2 mg/dL


(0.6-1.0) 





 


Estimated GFR


(Cockcroft-Gault) 


 


 23.7 


 





 


Glucose Level


 


 


 211 mg/dL


(70-99) 





 


Calcium Level


 


 


 8.7 mg/dL


(8.5-10.1) 





 


Phosphorus Level


 


 


 3.6 mg/dL


(2.6-4.7) 





 


Magnesium Level


 


 


 2.1 mg/dL


(1.8-2.4) 





 


Albumin


 


 


 2.6 g/dL


(3.4-5.0) 





 


Triglycerides Level


 


 


 436 mg/dL


(0-150) 





 


O2 Saturation    92 % (92-99) 


 


Arterial Blood pH


 


 


 


 7.40


(7.35-7.45)


 


Arterial Blood pCO2 at


Patient Temp 


 


 


 45 mmHg


(35-46)


 


Arterial Blood pO2 at Patient


Temp 


 


 


 69 mmHg


()


 


Arterial Blood HCO3


 


 


 


 27 mmol/L


(21-28)


 


Arterial Blood Base Excess


 


 


 


 2 mmol/L


(-3-3)


 


FiO2    35 


 


Test


 4/9/20


14:50 4/9/20


18:30 4/10/20


00:21 4/10/20


06:00


 


Glucose (Fingerstick)


 157 mg/dL


(70-99) 189 mg/dL


(70-99) 218 mg/dL


(70-99) 





 


White Blood Count


 


 


 


 13.1 x10^3/uL


(4.0-11.0)


 


Red Blood Count


 


 


 


 2.32 x10^6/uL


(3.50-5.40)


 


Hemoglobin


 


 


 


 7.4 g/dL


(12.0-15.5)


 


Hematocrit


 


 


 


 23.1 %


(36.0-47.0)


 


Mean Corpuscular Volume


 


 


 


 100 fL


()


 


Mean Corpuscular Hemoglobin    32 pg (25-35) 


 


Mean Corpuscular Hemoglobin


Concent 


 


 


 32 g/dL


(31-37)


 


Red Cell Distribution Width


 


 


 


 19.5 %


(11.5-14.5)


 


Platelet Count


 


 


 


 341 x10^3/uL


(140-400)


 


Neutrophils (%) (Auto)    60 % (31-73) 


 


Lymphocytes (%) (Auto)    17 % (24-48) 


 


Monocytes (%) (Auto)    13 % (0-9) 


 


Eosinophils (%) (Auto)    9 % (0-3) 


 


Basophils (%) (Auto)    1 % (0-3) 


 


Neutrophils # (Auto)


 


 


 


 7.9 x10^3/uL


(1.8-7.7)


 


Lymphocytes # (Auto)


 


 


 


 2.3 x10^3/uL


(1.0-4.8)


 


Monocytes # (Auto)


 


 


 


 1.7 x10^3/uL


(0.0-1.1)


 


Eosinophils # (Auto)


 


 


 


 1.1 x10^3/uL


(0.0-0.7)


 


Basophils # (Auto)


 


 


 


 0.1 x10^3/uL


(0.0-0.2)


 


Sodium Level


 


 


 


 137 mmol/L


(136-145)


 


Potassium Level


 


 


 


 3.8 mmol/L


(3.5-5.1)


 


Chloride Level


 


 


 


 99 mmol/L


()


 


Carbon Dioxide Level


 


 


 


 30 mmol/L


(21-32)


 


Anion Gap    8 (6-14) 


 


Blood Urea Nitrogen


 


 


 


 49 mg/dL


(7-20)


 


Creatinine


 


 


 


 2.3 mg/dL


(0.6-1.0)


 


Estimated GFR


(Cockcroft-Gault) 


 


 


 22.5 





 


Glucose Level


 


 


 


 209 mg/dL


(70-99)


 


Calcium Level


 


 


 


 8.4 mg/dL


(8.5-10.1)


 


Phosphorus Level


 


 


 


 2.6 mg/dL


(2.6-4.7)


 


Albumin


 


 


 


 3.0 g/dL


(3.4-5.0)


 


Test


 4/10/20


09:00 4/10/20


12:29 


 





 


O2 Saturation 95 % (92-99)    


 


Arterial Blood pH


 7.43


(7.35-7.45) 


 


 





 


Arterial Blood pCO2 at


Patient Temp 44 mmHg


(35-46) 


 


 





 


Arterial Blood pO2 at Patient


Temp 81 mmHg


() 


 


 





 


Arterial Blood HCO3


 28 mmol/L


(21-28) 


 


 





 


Arterial Blood Base Excess


 4 mmol/L


(-3-3) 


 


 





 


FiO2 35% vent    


 


Glucose (Fingerstick)


 


 247 mg/dL


(70-99) 


 











Laboratory Tests








Test


 4/9/20


18:30 4/10/20


00:21 4/10/20


06:00 4/10/20


09:00


 


Glucose (Fingerstick)


 189 mg/dL


(70-99) 218 mg/dL


(70-99) 


 





 


White Blood Count


 


 


 13.1 x10^3/uL


(4.0-11.0) 





 


Red Blood Count


 


 


 2.32 x10^6/uL


(3.50-5.40) 





 


Hemoglobin


 


 


 7.4 g/dL


(12.0-15.5) 





 


Hematocrit


 


 


 23.1 %


(36.0-47.0) 





 


Mean Corpuscular Volume


 


 


 100 fL


() 





 


Mean Corpuscular Hemoglobin   32 pg (25-35)  


 


Mean Corpuscular Hemoglobin


Concent 


 


 32 g/dL


(31-37) 





 


Red Cell Distribution Width


 


 


 19.5 %


(11.5-14.5) 





 


Platelet Count


 


 


 341 x10^3/uL


(140-400) 





 


Neutrophils (%) (Auto)   60 % (31-73)  


 


Lymphocytes (%) (Auto)   17 % (24-48)  


 


Monocytes (%) (Auto)   13 % (0-9)  


 


Eosinophils (%) (Auto)   9 % (0-3)  


 


Basophils (%) (Auto)   1 % (0-3)  


 


Neutrophils # (Auto)


 


 


 7.9 x10^3/uL


(1.8-7.7) 





 


Lymphocytes # (Auto)


 


 


 2.3 x10^3/uL


(1.0-4.8) 





 


Monocytes # (Auto)


 


 


 1.7 x10^3/uL


(0.0-1.1) 





 


Eosinophils # (Auto)


 


 


 1.1 x10^3/uL


(0.0-0.7) 





 


Basophils # (Auto)


 


 


 0.1 x10^3/uL


(0.0-0.2) 





 


Sodium Level


 


 


 137 mmol/L


(136-145) 





 


Potassium Level


 


 


 3.8 mmol/L


(3.5-5.1) 





 


Chloride Level


 


 


 99 mmol/L


() 





 


Carbon Dioxide Level


 


 


 30 mmol/L


(21-32) 





 


Anion Gap   8 (6-14)  


 


Blood Urea Nitrogen


 


 


 49 mg/dL


(7-20) 





 


Creatinine


 


 


 2.3 mg/dL


(0.6-1.0) 





 


Estimated GFR


(Cockcroft-Gault) 


 


 22.5 


 





 


Glucose Level


 


 


 209 mg/dL


(70-99) 





 


Calcium Level


 


 


 8.4 mg/dL


(8.5-10.1) 





 


Phosphorus Level


 


 


 2.6 mg/dL


(2.6-4.7) 





 


Albumin


 


 


 3.0 g/dL


(3.4-5.0) 





 


O2 Saturation    95 % (92-99) 


 


Arterial Blood pH


 


 


 


 7.43


(7.35-7.45)


 


Arterial Blood pCO2 at


Patient Temp 


 


 


 44 mmHg


(35-46)


 


Arterial Blood pO2 at Patient


Temp 


 


 


 81 mmHg


()


 


Arterial Blood HCO3


 


 


 


 28 mmol/L


(21-28)


 


Arterial Blood Base Excess


 


 


 


 4 mmol/L


(-3-3)


 


FiO2    35% vent 


 


Test


 4/10/20


12:29 


 


 





 


Glucose (Fingerstick)


 247 mg/dL


(70-99) 


 


 











Medications





Active Scripts








 Medications  Dose


 Route/Sig


 Max Daily Dose Days Date Category


 


 Bisoprolol


 Fumarate 5 Mg


 Tablet  10 Mg


 PO DAILY


   3/16/20 Reported








Comments


Chest x-ray reviewed 4/6





IMPRESSION: 


1. Increased moderate to large bilateral posteriorly layering pleural 


effusions with associated atelectasis.





Impression


.


IMPRESSION:


1.  Acute hypoxemic respiratory failure secondary to ARDS status post trach


2.  Gallstone pancreatitis. WITH NECROSIS


3.  Severe metabolic acidosis.stable


4.  Acute kidney injury-stable


5.  Acute gallstone pancreatitis.


6.  Hypoalbuminemia.


7.  Hypocalcemia.


8.  Leukocytosis


9.  Chronic anemia


10. Covid 19 testing negative


11. Acute /chronic anemia suspected hemorrhagic fluid in pelvis


12, Fever per ID





Plan


.


Switch patient over to pressure support of 18, tidal volumes 300-400


Fever persist, will defer to surgery and ID


Discussed with RN and RT.  Pressure support as tolerated


Transfuse prn, check H/H 


Antibiotics per ID, 


Follow nephrology 


Nutritional support with TPN


Prognosis is guarded





DVT/GI PPX 


d/w RN/RT











STEVE MIRANDA MD              Apr 10, 2020 17:04

## 2020-04-10 NOTE — NUR
SS following up with discharge planning. SS discussed with RNKassie. No new changes. Pt 
remains on the vent with trach and TPN and hemodialysis. Pt needing LTAC placement but is 
self pay. Pt's daughters working to obtain financial power of  so that they can help 
with Medicaid application. SS will continue to follow for discharge planning.

## 2020-04-10 NOTE — PDOC
Infectious Disease Note


Subjective:


Subjective


Sedated and intubated,  


T max > 101


afebrile this am


TPN 


Rectal tube





Vital Signs:


Vital Signs





Vital Signs








  Date Time  Temp Pulse Resp B/P (MAP) Pulse Ox O2 Delivery O2 Flow Rate FiO2


 


4/10/20 08:30  95 18 149/69 (95) 98 Ventilator  


 


4/10/20 08:00 100.1       





 100.1       











Physical Exam:


PHYSICAL EXAM


GENERAL: Orally intubated/sedated - generalized anasarca 


HEENT: Pupils equal, ETT, dobhoff +


NECK:  Supple ,trach +


LUNGS: Diminished aeration bases 


HEART:  S1, S2, regular 


ABDOMEN:  Distended, hypoactive BS, Rectal tube in place 


: [place]   


EXTREMITIES: Generalized edema, no cyanosis - some mottling, [place] boots & SCDs 

bilaterally 


DERMATOLOGIC:  Warm and dry.  No generalized rash.  


CENTRAL NERVOUS SYSTEM: Sedated 


 HDC +


Chestwall line present





Medications:


Inpatient Meds:





Current Medications








 Medications


  (Trade)  Dose


 Ordered  Sig/Yee  Start Time


 Stop Time Status Last Admin


Dose Admin


 


 Acetaminophen


  (Tylenol Supp)  650 mg  PRN Q6HRS  PRN  3/24/20 10:30


    4/9/20 22:30


650 MG


 


 Acetaminophen


  (Tylenol)  650 mg  PRN Q6HRS  PRN  3/21/20 03:36


    3/26/20 07:35


650 MG


 


 Albumin Human  200 ml @ 


 200 mls/hr  1X PRN  PRN  4/9/20 08:00


 4/9/20 13:59 DC 4/9/20 09:30


200 MLS/HR


 


 Albuterol Sulfate


  (Ventolin Neb


 Soln)  2.5 mg  1X  ONCE  3/17/20 22:30


 3/17/20 22:31 DC 3/18/20 00:56


2.5 MG


 


 Alteplase,


 Recombinant


  (Cathflo For


 Central Catheter


 Clearance)  1 mg  1X  ONCE  3/31/20 22:00


 3/31/20 22:01 DC 3/31/20 22:05


1 MG


 


 Artificial Tears


  (Artificial


 Tears)  1 drop  PRN Q1HR  PRN  3/23/20 08:15


     





 


 Atenolol


  (Tenormin)  100 mg  DAILY  3/17/20 09:00


 3/16/20 20:08 DC  





 


 Atropine Sulfate


  (ATROPINE 0.5mg


 SYRINGE)  0.5 mg  PRN Q5MIN  PRN  4/2/20 08:15


     





 


 Benzocaine


  (Hurricaine One)  1 spray  1X  ONCE  3/20/20 14:30


 3/20/20 14:31 DC 3/20/20 16:38


1 SPRAY


 


 Bupivacaine HCl/


 Epinephrine Bitart


  (Sensorcain-Epi


 0.5%-1:186341 Mpf)  30 ml  STK-MED ONCE  4/6/20 11:00


 4/6/20 11:01 DC 4/6/20 11:44


1 ML


 


 Calcium Carbonate/


 Glycine


  (Tums)  500 mg  PRN AFTMEALHC  PRN  3/18/20 17:45


     





 


 Calcium Chloride


 1000 mg/Sodium


 Chloride  110 ml @ 


 220 mls/hr  1X  ONCE  3/17/20 22:30


 3/17/20 22:59 DC 3/17/20 22:11


220 MLS/HR


 


 Calcium Chloride


 3000 mg/Sodium


 Chloride  1,030 ml @ 


 50 mls/hr  [id number]  3/19/20 08:00


 3/21/20 15:23 DC 3/21/20 02:17


50 MLS/HR


 


 Calcium Gluconate


  (Calcium


 Gluconate)  2,000 mg  1X  ONCE  3/19/20 02:15


 3/19/20 02:16 DC 3/19/20 02:19


2,000 MG


 


 Calcium Gluconate


 1000 mg/Sodium


 Chloride  110 ml @ 


 220 mls/hr  1X  ONCE  3/18/20 03:30


 3/18/20 03:59 DC 3/18/20 03:21


220 MLS/HR


 


 Calcium Gluconate


 2000 mg/Sodium


 Chloride  120 ml @ 


 220 mls/hr  1X  ONCE  3/18/20 07:30


 3/18/20 08:02 DC 3/18/20 09:05


220 MLS/HR


 


 Cefepime HCl


  (Maxipime)  2 gm  Q12HR  3/25/20 09:00


 4/8/20 09:58 DC 4/7/20 20:56


2 GM


 


 Cellulose


  (Surgicel


 Fibrillar 1x2)  1 each  STK-MED ONCE  4/6/20 11:00


 4/6/20 11:01 DC  





 


 Cellulose


  (Surgicel


 Hemostat 4x8)  1 each  STK-MED ONCE  4/6/20 11:55


 4/6/20 11:56 DC 4/6/20 11:44


1 EACH


 


 Daptomycin 500 mg/


 Sodium Chloride  50 ml @ 


 100 mls/hr  Q48H  3/25/20 08:30


    4/10/20 09:57


100 MLS/HR


 


 Dexmedetomidine


 HCl 400 mcg/


 Sodium Chloride  100 ml @ 0


 mls/hr  CONT  PRN  4/2/20 08:15


    4/10/20 08:26


22.2 MLS/HR


 


 Dextrose


  (Dextrose


 50%-Water Syringe)  12.5 gm  PRN Q15MIN  PRN  3/16/20 09:30


     





 


 Digoxin


  (Lanoxin)  125 mcg  1X  ONCE  3/19/20 18:00


 3/19/20 18:01 DC 3/19/20 17:10


125 MCG


 


 Diphenhydramine


 HCl


  (Benadryl)  25 mg  1X PRN  PRN  4/8/20 08:00


 4/9/20 07:59 DC  





 


 Etomidate


  (Amidate)  8 mg  1X  ONCE  3/23/20 08:30


 3/23/20 08:31 DC 3/23/20 08:33


8 MG


 


 Fentanyl Citrate


  (Fentanyl 2ml


 Vial)  50 mcg  PRN Q5MIN  PRN  4/6/20 07:00


 4/7/20 06:59 DC  





 


 Furosemide


  (Lasix)  20 mg  1X  ONCE  4/2/20 08:15


 4/2/20 08:16 DC 4/2/20 08:19


20 MG


 


 Heparin Sodium


  (Porcine)


  (Hep Lock Adult)  500 unit  STK-MED ONCE  4/7/20 09:29


 4/7/20 09:30 DC  





 


 Heparin Sodium


  (Porcine)


  (Heparin Sodium)  5,000 unit  Q12HR  4/3/20 21:00


    4/10/20 09:00


5,000 UNIT


 


 Hydromorphone HCl


  (Dilaudid)  1 mg  PRN Q3HRS  PRN  3/17/20 12:00


 3/31/20 00:25 DC 3/23/20 05:13


1 MG


 


 Info


  (CONTRAST GIVEN


 -- Rx MONITORING)  1 each  PRN DAILY  PRN  3/30/20 11:45


 4/1/20 11:44 DC  





 


 Info


  (Icu Electrolyte


 Protocol)  1 ea  CONT PRN  PRN  3/29/20 13:15


     





 


 Info


  (PHARMACY


 MONITORING -- do


 not chart)  1 each  PRN DAILY  PRN  4/9/20 08:15


   UNV  





 


 Info


  (Tpn Per


 Pharmacy)  1 each  PRN DAILY  PRN  3/18/20 12:30


   UNV  





 


 Insulin Human


 Lispro


  (HumaLOG)  0-9 UNITS  Q6HRS  3/16/20 09:30


    4/10/20 07:52


5 UNITS


 


 Insulin Human


 Regular


  (HumuLIN R VIAL)  5 unit  1X  ONCE  3/17/20 22:30


 3/17/20 22:31 DC 3/17/20 22:14


5 UNIT


 


 Iohexol


  (Omnipaque 240


 Mg/ml)  30 ml  1X  ONCE  3/30/20 11:30


 3/30/20 11:33 DC 3/30/20 11:30


30 ML


 


 Iohexol


  (Omnipaque 300


 Mg/ml)  60 ml  1X  ONCE  3/17/20 17:00


 3/17/20 17:01 DC 3/17/20 17:20


60 ML


 


 Iohexol


  (Omnipaque 350


 Mg/ml)  90 ml  1X  ONCE  3/16/20 03:30


 3/16/20 03:31 DC 3/16/20 03:25


90 ML


 


 Ketorolac


 Tromethamine


  (Toradol 30mg


 Vial)  30 mg  1X  ONCE  3/16/20 03:00


 3/16/20 03:01 DC 3/16/20 02:54


30 MG


 


 Lidocaine HCl


  (Buffered


 Lidocaine 1%)  6 ml  1X  ONCE  4/2/20 09:00


 4/2/20 09:06 DC 4/2/20 09:05


6 ML


 


 Lidocaine HCl


  (Glydo


  (Lidocaine) Jelly)  1 thomas  1X  ONCE  3/20/20 14:30


 3/20/20 14:31 DC 3/20/20 16:38


1 THOMAS


 


 Lidocaine HCl


  (Xylocaine-Mpf


 1% 2ml Vial)  2 ml  PRN 1X  PRN  4/6/20 07:00


 4/7/20 06:59 DC  





 


 Linezolid/Dextrose  300 ml @ 


 300 mls/hr  Q12HR  3/20/20 20:00


 3/27/20 07:50 DC 3/26/20 21:04


300 MLS/HR


 


 Lorazepam


  (Ativan Inj)  1 mg  PRN Q4HRS  PRN  3/19/20 09:00


    3/23/20 00:34


1 MG


 


 Magnesium Sulfate  50 ml @ 25


 mls/hr  PRN DAILY  PRN  4/5/20 09:15


     





 


 Meropenem 1 gm/


 Sodium Chloride  100 ml @ 


 200 mls/hr  Q8HRS  3/17/20 20:00


 3/18/20 08:48 DC 3/18/20 05:45


200 MLS/HR


 


 Meropenem 500 mg/


 Sodium Chloride  50 ml @ 


 100 mls/hr  Q12H  4/8/20 10:00


    4/9/20 22:12


100 MLS/HR


 


 Metoprolol


 Tartrate


  (Lopressor Vial)  5 mg  Q6HRS  3/17/20 10:15


 3/28/20 08:48 DC 3/26/20 00:12


5 MG


 


 Metronidazole  100 ml @ 


 100 mls/hr  Q6HRS  3/23/20 08:30


 4/8/20 09:58 DC 4/8/20 06:26


100 MLS/HR


 


 Micafungin Sodium


 100 mg/Dextrose  100 ml @ 


 100 mls/hr  Q24H  3/23/20 09:00


    4/10/20 08:05


100 MLS/HR


 


 Midazolam HCl


  (Versed)  5 mg  1X  ONCE  3/23/20 08:30


 3/23/20 08:31 DC  





 


 Midazolam HCl 100


 mg/Sodium Chloride  100 ml @ 7


 mls/hr  CONT  PRN  3/28/20 16:00


    4/8/20 15:35


7 MLS/HR


 


 Midazolam HCl 50


 mg/Sodium Chloride  50 ml @ 0


 mls/hr  CONT  PRN  3/23/20 08:15


 3/28/20 15:59 DC 3/26/20 22:39


7 MLS/HR


 


 Morphine Sulfate


  (Morphine


 Sulfate)  2 mg  PRN Q2HR  PRN  3/16/20 05:00


 3/17/20 14:15 DC 3/17/20 12:26


2 MG


 


 Multi-Ingred


 Cream/Lotion/Oil/


 Oint


  (Artificial


 Tears Eye


 Ointment)  1 thomas  PRN Q1HR  PRN  3/25/20 17:30


    4/3/20 11:05


1 THOMAS


 


 Norepinephrine


 Bitartrate 8 mg/


 Dextrose  258 ml @ 


 17.299 mls/


 hr  CONT  PRN  3/17/20 15:30


    4/9/20 12:39


17.299 MLS/HR


 


 Ondansetron HCl


  (Zofran)  4 mg  PRN Q6HRS  PRN  4/6/20 07:00


 4/7/20 06:59 DC  





 


 Pantoprazole


 Sodium


  (PROTONIX VIAL


 for IV PUSH)  40 mg  DAILYAC  3/16/20 11:30


    4/10/20 08:05


40 MG


 


 Piperacillin Sod/


 Tazobactam Sod


 4.5 gm/Sodium


 Chloride  100 ml @ 


 200 mls/hr  1X  ONCE  3/16/20 06:00


 3/16/20 06:29 DC 3/16/20 05:44


200 MLS/HR


 


 Potassium


 Chloride 15 meq/


 Bicarbonate


 Dialysis Soln w/


 out KCl  5,007.5 ml


  @ 1,000 mls/


 hr  Q5H1M  3/29/20 20:00


 4/2/20 13:08 DC 4/1/20 18:14


1,000 MLS/HR


 


 Potassium


 Chloride 20 meq/


 Bicarbonate


 Dialysis Soln w/


 out KCl  5,010 ml @ 


 1,000 mls/hr  Q5H1M  3/25/20 16:00


 3/29/20 19:59 DC 3/29/20 14:54


1,000 MLS/HR


 


 Potassium


 Chloride/Water  100 ml @ 


 100 mls/hr  Q1H  3/24/20 11:00


 3/24/20 12:59 DC 3/24/20 12:12


100 MLS/HR


 


 Potassium


 Phosphate 20 mmol/


 Sodium Chloride  106.6667


 ml @ 


 51.667 m...  1X  ONCE  3/25/20 13:00


 3/25/20 15:03 DC 3/25/20 12:51


51.667 MLS/HR


 


 Prochlorperazine


 Edisylate


  (Compazine)  5 mg  PACU PRN  PRN  4/6/20 07:00


 4/7/20 06:59 DC  





 


 Propofol  20 ml @ As


 Directed  STK-MED ONCE  4/6/20 11:07


 4/6/20 11:07 DC  





 


 Ringer's Solution  1,000 ml @ 


 30 mls/hr  Q24H  4/6/20 07:00


 4/6/20 18:59 DC  





 


 Sevoflurane


  (Ultane)  60 ml  STK-MED ONCE  4/6/20 12:46


 4/6/20 12:46 DC  





 


 Sodium


 Bicarbonate 50


 meq/Sodium


 Chloride  1,050 ml @ 


 75 mls/hr  Q14H  3/18/20 07:30


 3/23/20 10:28 DC 3/22/20 21:10


75 MLS/HR


 


 Sodium Chloride


  (Normal Saline


 Flush)  10 ml  1X PRN  PRN  4/3/20 07:30


 4/4/20 07:29 DC  





 


 Sodium Chloride


 90 meq/Calcium


 Gluconate 10 meq/


 Multivitamins 10


 ml/Chromium/


 Copper/Manganese/


 Seleni/Zn 0.5 ml/


 Total Parenteral


 Nutrition/Amino


 Acids/Dextrose/


 Fat Emulsion


 Intravenous  1,512 ml @ 


 63 mls/hr  TPN  CONT  3/18/20 22:00


 3/19/20 21:59 DC 3/18/20 22:06


63 MLS/HR


 


 Sodium Chloride


 90 meq/Calcium


 Gluconate 10 meq/


 Multivitamins 10


 ml/Chromium/


 Copper/Manganese/


 Seleni/Zn 1 ml/


 Total Parenteral


 Nutrition/Amino


 Acids/Dextrose/


 Fat Emulsion


 Intravenous  55.005 ml


  @ 2.292


 mls/hr  TPN  CONT  3/18/20 22:00


 3/18/20 12:33 DC  





 


 Sodium Chloride


 90 meq/Magnesium


 Sulfate 10 meq/


 Calcium Gluconate


 20 meq/


 Multivitamins 10


 ml/Chromium/


 Copper/Manganese/


 Seleni/Zn 0.5 ml/


 Total Parenteral


 Nutrition/Amino


 Acids/Dextrose/


 Fat Emulsion


 Intravenous  1,512 ml @ 


 63 mls/hr  TPN  CONT  3/19/20 22:00


 3/20/20 21:59 DC 3/19/20 22:25


63 MLS/HR


 


 Sodium Chloride


 90 meq/Magnesium


 Sulfate 12 meq/


 Calcium Gluconate


 15 meq/


 Multivitamins 10


 ml/Chromium/


 Copper/Manganese/


 Seleni/Zn 0.5 ml/


 Insulin Human


 Regular 25 unit/


 Total Parenteral


 Nutrition/Amino


 Acids/Dextrose/


 Fat Emulsion


 Intravenous  1,400 ml @ 


 58.333 mls/


 hr  TPN  CONT  4/8/20 22:00


 4/9/20 21:59 DC 4/8/20 21:41


58.333 MLS/HR


 


 Sodium Chloride


 90 meq/Potassium


 Chloride 15 meq/


 Magnesium Sulfate


 12 meq/Calcium


 Gluconate 15 meq/


 Multivitamins 10


 ml/Chromium/


 Copper/Manganese/


 Seleni/Zn 0.5 ml/


 Insulin Human


 Regular 25 unit/


 Total Parenteral


 Nutrition/Amino


 Acids/Dextrose/


 Fat Emulsion


 Intravenous  1,400 ml @ 


 58.333 mls/


 hr  TPN  CONT  4/7/20 22:00


 4/8/20 21:59 DC 4/7/20 22:13


58.333 MLS/HR


 


 Sodium Chloride


 90 meq/Potassium


 Chloride 15 meq/


 Potassium


 Phosphate 10 mmol/


 Magnesium Sulfate


 8 meq/Calcium


 Gluconate 15 meq/


 Multivitamins 10


 ml/Chromium/


 Copper/Manganese/


 Seleni/Zn 0.5 ml/


 Insulin Human


 Regular 25 unit/


 Total Parenteral


 Nutrition/Amino


 Acids/Dextrose/


 Fat Emulsion


 Intravenous  1,400 ml @ 


 58.333 mls/


 hr  TPN  CONT  4/5/20 22:00


 4/6/20 21:59 DC 4/5/20 21:20


58.333 MLS/HR


 


 Sodium Chloride


 90 meq/Potassium


 Chloride 15 meq/


 Potassium


 Phosphate 10 mmol/


 Magnesium Sulfate


 10 meq/Calcium


 Gluconate 20 meq/


 Multivitamins 10


 ml/Chromium/


 Copper/Manganese/


 Seleni/Zn 0.5 ml/


 Total Parenteral


 Nutrition/Amino


 Acids/Dextrose/


 Fat Emulsion


 Intravenous  1,400 ml @ 


 58.333 mls/


 hr  TPN  CONT  3/23/20 22:00


 3/24/20 21:59 DC 3/23/20 21:42


58.333 MLS/HR


 


 Sodium Chloride


 90 meq/Potassium


 Chloride 15 meq/


 Potassium


 Phosphate 10 mmol/


 Magnesium Sulfate


 12 meq/Calcium


 Gluconate 15 meq/


 Multivitamins 10


 ml/Chromium/


 Copper/Manganese/


 Seleni/Zn 0.5 ml/


 Insulin Human


 Regular 25 unit/


 Total Parenteral


 Nutrition/Amino


 Acids/Dextrose/


 Fat Emulsion


 Intravenous  1,400 ml @ 


 58.333 mls/


 hr  TPN  CONT  4/6/20 22:00


 4/7/20 21:59 DC 4/6/20 22:24


58.333 MLS/HR


 


 Sodium Chloride


 90 meq/Potassium


 Chloride 15 meq/


 Potassium


 Phosphate 15 mmol/


 Magnesium Sulfate


 10 meq/Calcium


 Gluconate 15 meq/


 Multivitamins 10


 ml/Chromium/


 Copper/Manganese/


 Seleni/Zn 0.5 ml/


 Total Parenteral


 Nutrition/Amino


 Acids/Dextrose/


 Fat Emulsion


 Intravenous  1,400 ml @ 


 58.333 mls/


 hr  TPN  CONT  3/24/20 22:00


 3/25/20 21:59 DC 3/24/20 22:17


58.333 MLS/HR


 


 Sodium Chloride


 90 meq/Potassium


 Chloride 15 meq/


 Potassium


 Phosphate 15 mmol/


 Magnesium Sulfate


 10 meq/Calcium


 Gluconate 20 meq/


 Multivitamins 10


 ml/Chromium/


 Copper/Manganese/


 Seleni/Zn 0.5 ml/


 Total Parenteral


 Nutrition/Amino


 Acids/Dextrose/


 Fat Emulsion


 Intravenous  1,200 ml @ 


 50 mls/hr  TPN  CONT  3/22/20 22:00


 3/22/20 14:17 DC  





 


 Sodium Chloride


 90 meq/Potassium


 Chloride 15 meq/


 Potassium


 Phosphate 18 mmol/


 Magnesium Sulfate


 8 meq/Calcium


 Gluconate 15 meq/


 Multivitamins 10


 ml/Chromium/


 Copper/Manganese/


 Seleni/Zn 0.5 ml/


 Insulin Human


 Regular 10 unit/


 Total Parenteral


 Nutrition/Amino


 Acids/Dextrose/


 Fat Emulsion


 Intravenous  1,400 ml @ 


 58.333 mls/


 hr  TPN  CONT  3/27/20 22:00


 3/28/20 21:59 DC 3/27/20 21:43


58.333 MLS/HR


 


 Sodium Chloride


 90 meq/Potassium


 Chloride 15 meq/


 Potassium


 Phosphate 18 mmol/


 Magnesium Sulfate


 8 meq/Calcium


 Gluconate 15 meq/


 Multivitamins 10


 ml/Chromium/


 Copper/Manganese/


 Seleni/Zn 0.5 ml/


 Insulin Human


 Regular 15 unit/


 Total Parenteral


 Nutrition/Amino


 Acids/Dextrose/


 Fat Emulsion


 Intravenous  1,400 ml @ 


 58.333 mls/


 hr  TPN  CONT  3/30/20 22:00


 3/31/20 21:59 DC 3/30/20 21:47


58.333 MLS/HR


 


 Sodium Chloride


 90 meq/Potassium


 Chloride 15 meq/


 Potassium


 Phosphate 18 mmol/


 Magnesium Sulfate


 8 meq/Calcium


 Gluconate 15 meq/


 Multivitamins 10


 ml/Chromium/


 Copper/Manganese/


 Seleni/Zn 0.5 ml/


 Insulin Human


 Regular 20 unit/


 Total Parenteral


 Nutrition/Amino


 Acids/Dextrose/


 Fat Emulsion


 Intravenous  1,400 ml @ 


 58.333 mls/


 hr  TPN  CONT  4/2/20 22:00


 4/3/20 21:59 DC 4/2/20 22:45


58.333 MLS/HR


 


 Sodium Chloride


 90 meq/Potassium


 Chloride 15 meq/


 Potassium


 Phosphate 18 mmol/


 Magnesium Sulfate


 8 meq/Calcium


 Gluconate 15 meq/


 Multivitamins 10


 ml/Chromium/


 Copper/Manganese/


 Seleni/Zn 0.5 ml/


 Total Parenteral


 Nutrition/Amino


 Acids/Dextrose/


 Fat Emulsion


 Intravenous  1,400 ml @ 


 58.333 mls/


 hr  TPN  CONT  3/26/20 22:00


 3/27/20 21:59 DC 3/26/20 22:00


58.333 MLS/HR


 


 Sodium Chloride


 90 meq/Potassium


 Phosphate 5 mmol/


 Magnesium Sulfate


 12 meq/Calcium


 Gluconate 15 meq/


 Multivitamins 10


 ml/Chromium/


 Copper/Manganese/


 Seleni/Zn 0.5 ml/


 Insulin Human


 Regular 30 unit/


 Total Parenteral


 Nutrition/Amino


 Acids/Dextrose/


 Fat Emulsion


 Intravenous  1,400 ml @ 


 58.333 mls/


 hr  TPN  CONT  4/9/20 22:00


 4/10/20 21:59  4/9/20 22:08


58.333 MLS/HR


 


 Succinylcholine


 Chloride


  (Anectine)  120 mg  1X  ONCE  3/23/20 08:30


 3/23/20 08:31 DC 3/23/20 08:34


120 MG











Labs:


Lab





Laboratory Tests








Test


 4/9/20


14:50 4/9/20


18:30 4/10/20


00:21 4/10/20


06:00


 


Glucose (Fingerstick)


 157 mg/dL


(70-99) 189 mg/dL


(70-99) 218 mg/dL


(70-99) 





 


White Blood Count


 


 


 


 13.1 x10^3/uL


(4.0-11.0)


 


Red Blood Count


 


 


 


 2.32 x10^6/uL


(3.50-5.40)


 


Hemoglobin


 


 


 


 7.4 g/dL


(12.0-15.5)


 


Hematocrit


 


 


 


 23.1 %


(36.0-47.0)


 


Mean Corpuscular Volume


 


 


 


 100 fL


()


 


Mean Corpuscular Hemoglobin    32 pg (25-35) 


 


Mean Corpuscular Hemoglobin


Concent 


 


 


 32 g/dL


(31-37)


 


Red Cell Distribution Width


 


 


 


 19.5 %


(11.5-14.5)


 


Platelet Count


 


 


 


 341 x10^3/uL


(140-400)


 


Neutrophils (%) (Auto)    60 % (31-73) 


 


Lymphocytes (%) (Auto)    17 % (24-48) 


 


Monocytes (%) (Auto)    13 % (0-9) 


 


Eosinophils (%) (Auto)    9 % (0-3) 


 


Basophils (%) (Auto)    1 % (0-3) 


 


Neutrophils # (Auto)


 


 


 


 7.9 x10^3/uL


(1.8-7.7)


 


Lymphocytes # (Auto)


 


 


 


 2.3 x10^3/uL


(1.0-4.8)


 


Monocytes # (Auto)


 


 


 


 1.7 x10^3/uL


(0.0-1.1)


 


Eosinophils # (Auto)


 


 


 


 1.1 x10^3/uL


(0.0-0.7)


 


Basophils # (Auto)


 


 


 


 0.1 x10^3/uL


(0.0-0.2)


 


Sodium Level


 


 


 


 137 mmol/L


(136-145)


 


Potassium Level


 


 


 


 3.8 mmol/L


(3.5-5.1)


 


Chloride Level


 


 


 


 99 mmol/L


()


 


Carbon Dioxide Level


 


 


 


 30 mmol/L


(21-32)


 


Anion Gap    8 (6-14) 


 


Blood Urea Nitrogen


 


 


 


 49 mg/dL


(7-20)


 


Creatinine


 


 


 


 2.3 mg/dL


(0.6-1.0)


 


Estimated GFR


(Cockcroft-Gault) 


 


 


 22.5 





 


Glucose Level


 


 


 


 209 mg/dL


(70-99)


 


Calcium Level


 


 


 


 8.4 mg/dL


(8.5-10.1)


 


Phosphorus Level


 


 


 


 2.6 mg/dL


(2.6-4.7)


 


Albumin


 


 


 


 3.0 g/dL


(3.4-5.0)


 


Test


 4/10/20


09:00 


 


 





 


O2 Saturation 95 % (92-99)    


 


Arterial Blood pH


 7.43


(7.35-7.45) 


 


 





 


Arterial Blood pCO2 at


Patient Temp 44 mmHg


(35-46) 


 


 





 


Arterial Blood pO2 at Patient


Temp 81 mmHg


() 


 


 





 


Arterial Blood HCO3


 28 mmol/L


(21-28) 


 


 





 


Arterial Blood Base Excess


 4 mmol/L


(-3-3) 


 


 





 


FiO2 35% vent    











Objective:


Assessment:


FEVER again  


Acute pancreatitis with persistent  necrosis


CT a/p 4/9


    Increased ascites. Persistent evidence of necrotizing pancreatitis with 

fluid and phlegmon


   at the pancreas


Cholelithiasis with thickening of the gallbladder wall.


Leucocytosis improving


JED,Hyperkalemia, Metabolic acidosis on HD


Acute hypoxic resp failure ,bilateral pleural effusion and atelectasis


hypocalcemia 


Prediabetes


HTN


s/p trach





Plan:


Plan of Care


 cont merrem 4/8;was on cefepime and flagyl


DC  Daptomycin (3/25), start zyvox


 cont micafungin


repeat ct 4/9 noted,  Gen surgery following


repeat ua and uc


repeat bc


f/u cults 


Maintain aspiration precautions 


COVID-19 neg, 3/26  


C diff negative 3/23


 Lt IJ DC'd


 


 Critically ill








D/W IVAN MENDOZA MD           Apr 10, 2020 10:04

## 2020-04-10 NOTE — PDOC
PROGRESS NOTES


Assessment


Assessment


Respiratory failure.


Seizure.


Metabolic encephalopathy.


Fever 102.7 degree, recurrent fever noted.


Metabolic acidosis.


Diffuse pulmonary infiltrate.


Pleural effusion.


Pancreatitis, necrotizing.


Gallstone.


Leukocytosis.


Lymphopenia.


Electrolytes imbalances.


Hyperglycemia.


DM.


HTN.


HLD.


Anemia.


Abnormal CXR.


Obesity.


Covid-19 still suspected.





RECOMMENDATIONS/PLAN:


Continue life support in CCU at the present time.


Keppra if has further seizures.


Treat medical diseases.


OT/PT.





EEG: No seizure activity.





OBJECTIVE:


Fever 100.6 degree before and now T 99.8 degree..


4/9/20: No seizures reported over night.





Past Medical History


Cardiovascular:  HTN, Hyperlipidemia


Endocrine:  Diabetes





Family History


Unobtainable.





Social HistoryU


not obtainable





Allergies


Coded Allergies:  


Codeine (Verified  Allergy, Intermediate, rash, 3/16/20)





ROS


Unobtainable.





NEUROLOGICAL  EXAMINATION:


Decreased responsiveness.


Not oriented to time, place and person.


PERRL.


EOMI not active.


CN: no acute focal findings.


Muscle tone: Decreased.


Muscle strength: No movements to stimuli.


DTR: 1 UE, 2+ LE.


Plantar reflex: Neutral response bilaterally 


Gait: not able to walk.


Sensory exam: no response to stimuli..


Not able to access cerebellar signs.


F-T-N test not performed due to decreased response. 


Patient examined in CCU.





Objective


Objective





Vital Signs








  Date Time  Temp Pulse Resp B/P (MAP) Pulse Ox O2 Delivery O2 Flow Rate FiO2


 


4/10/20 12:46  99 22 106/52 (70) 100 Ventilator  


 


4/10/20 12:00 98.2       





 98.2       














Intake and Output 


 


 4/10/20





 06:59


 


Intake Total 2354 ml


 


Output Total 390 ml


 


Balance 1964 ml


 


 


 


Intake Oral 0 ml


 


IV Total 2354 ml


 


Output Urine Total 340 ml


 


Gastric Drainage Total 50 ml











Vitals Signs


Vitals





                          VS - Last 72 Hours, by Label








  Date Time  Temp Pulse Resp B/P (MAP) Pulse Ox O2 Delivery O2 Flow Rate FiO2


 


4/10/20 12:46  99 22 106/52 (70) 100 Ventilator  


 


4/10/20 12:00 98.2 100 18 106/59 (75) 99 Ventilator  





 98.2       


 


4/10/20 11:00  88 18 110/58 (75) 99 Ventilator  


 


4/10/20 10:00  92 18 107/51 (69) 98 Ventilator  


 


4/10/20 09:00  89 18 119/52 (74) 97 Ventilator  


 


4/10/20 08:30  95 18 149/69 (95) 98 Ventilator  


 


4/10/20 08:26   18  97 Ventilator  


 


4/10/20 08:15  101 18 71/36 (48) 98 Ventilator  


 


4/10/20 08:00     98 Ventilator  


 


4/10/20 08:00      Mechanical Ventilator  


 


4/10/20 08:00 100.1 88 18 118/57 (77) 98 Ventilator  





 100.1       


 


4/10/20 07:51   18  98 Ventilator  


 


4/10/20 07:00  90 18 121/61 (81) 98 Ventilator  


 


4/10/20 06:00  92 18 120/58 (78) 98 Ventilator  


 


4/10/20 05:00  88 18 101/58 (72) 98 Ventilator  


 


4/10/20 04:05     98 Ventilator  


 


4/10/20 04:00      Mechanical Ventilator  


 


4/10/20 04:00 100.9 94 18 109/57 (74) 97 Ventilator  





 100.9       


 


4/10/20 03:00  96 18 111/57 (75) 98 Ventilator  


 


4/10/20 02:00  96 18 111/59 (76) 98 Ventilator  


 


4/10/20 01:00  100 18 116/57 (76) 97 Ventilator  


 


4/10/20 00:05     98 Ventilator  


 


4/10/20 00:00 101.9 98 18 107/55 (72) 98 Ventilator  





 101.9       


 


4/10/20 00:00      Mechanical Ventilator  


 


4/9/20 23:00  96 18 100/52 (68) 98 Ventilator  


 


4/9/20 22:15  100 18 114/53 (73) 97 Ventilator  


 


4/9/20 22:00 101.2 100 18 130/58 (82) 97 Ventilator  





 101.2       


 


4/9/20 21:00  102 18 118/58 (78) 97 Ventilator  


 


4/9/20 20:06     98 Ventilator  


 


4/9/20 20:00 100.9 98 18 122/65 (84) 98 Ventilator  





 100.9       


 


4/9/20 20:00      Mechanical Ventilator  


 


4/9/20 19:00  102 20 101/59 (73) 98 Ventilator  


 


4/9/20 18:00  134 20 91/70 (77) 99 Ventilator  


 


4/9/20 17:00  106 17 125/63 (83) 100 Ventilator  


 


4/9/20 16:00  96 17 124/66 (85) 99 Ventilator  


 


4/9/20 15:00  90 18 86/56 (66) 99 Ventilator  


 


4/9/20 15:00      Mechanical Ventilator  


 


4/9/20 14:48     100 Ventilator  


 


4/9/20 14:30 99.4 98 18 112/56 (74) 100 Ventilator  





 99.4       


 


4/9/20 13:00  94 18 106/64 (78) 100 Ventilator  


 


4/9/20 12:15  96 18 110/56 (74) 100 Ventilator  


 


4/9/20 12:09     100 Ventilator  


 


4/9/20 10:56      Ventilator  


 


4/9/20 10:26     98   


 


4/9/20 08:08     98 Ventilator  


 


4/9/20 08:00 99.8 95 18 94/53 (67) 97 Ventilator  





 99.8       


 


4/9/20 08:00      Mechanical Ventilator  


 


4/9/20 07:00  96 18 108/59 (75) 98 Ventilator  











Laboratory


Laboratory





Laboratory Tests








Test


 4/9/20


14:50 4/9/20


18:30 4/10/20


00:21 4/10/20


06:00


 


Glucose (Fingerstick)


 157 mg/dL


(70-99) 189 mg/dL


(70-99) 218 mg/dL


(70-99) 





 


White Blood Count


 


 


 


 13.1 x10^3/uL


(4.0-11.0)


 


Red Blood Count


 


 


 


 2.32 x10^6/uL


(3.50-5.40)


 


Hemoglobin


 


 


 


 7.4 g/dL


(12.0-15.5)


 


Hematocrit


 


 


 


 23.1 %


(36.0-47.0)


 


Mean Corpuscular Volume


 


 


 


 100 fL


()


 


Mean Corpuscular Hemoglobin    32 pg (25-35) 


 


Mean Corpuscular Hemoglobin


Concent 


 


 


 32 g/dL


(31-37)


 


Red Cell Distribution Width


 


 


 


 19.5 %


(11.5-14.5)


 


Platelet Count


 


 


 


 341 x10^3/uL


(140-400)


 


Neutrophils (%) (Auto)    60 % (31-73) 


 


Lymphocytes (%) (Auto)    17 % (24-48) 


 


Monocytes (%) (Auto)    13 % (0-9) 


 


Eosinophils (%) (Auto)    9 % (0-3) 


 


Basophils (%) (Auto)    1 % (0-3) 


 


Neutrophils # (Auto)


 


 


 


 7.9 x10^3/uL


(1.8-7.7)


 


Lymphocytes # (Auto)


 


 


 


 2.3 x10^3/uL


(1.0-4.8)


 


Monocytes # (Auto)


 


 


 


 1.7 x10^3/uL


(0.0-1.1)


 


Eosinophils # (Auto)


 


 


 


 1.1 x10^3/uL


(0.0-0.7)


 


Basophils # (Auto)


 


 


 


 0.1 x10^3/uL


(0.0-0.2)


 


Sodium Level


 


 


 


 137 mmol/L


(136-145)


 


Potassium Level


 


 


 


 3.8 mmol/L


(3.5-5.1)


 


Chloride Level


 


 


 


 99 mmol/L


()


 


Carbon Dioxide Level


 


 


 


 30 mmol/L


(21-32)


 


Anion Gap    8 (6-14) 


 


Blood Urea Nitrogen


 


 


 


 49 mg/dL


(7-20)


 


Creatinine


 


 


 


 2.3 mg/dL


(0.6-1.0)


 


Estimated GFR


(Cockcroft-Gault) 


 


 


 22.5 





 


Glucose Level


 


 


 


 209 mg/dL


(70-99)


 


Calcium Level


 


 


 


 8.4 mg/dL


(8.5-10.1)


 


Phosphorus Level


 


 


 


 2.6 mg/dL


(2.6-4.7)


 


Albumin


 


 


 


 3.0 g/dL


(3.4-5.0)


 


Test


 4/10/20


09:00 4/10/20


12:29 


 





 


O2 Saturation 95 % (92-99)    


 


Arterial Blood pH


 7.43


(7.35-7.45) 


 


 





 


Arterial Blood pCO2 at


Patient Temp 44 mmHg


(35-46) 


 


 





 


Arterial Blood pO2 at Patient


Temp 81 mmHg


() 


 


 





 


Arterial Blood HCO3


 28 mmol/L


(21-28) 


 


 





 


Arterial Blood Base Excess


 4 mmol/L


(-3-3) 


 


 





 


FiO2 35% vent    


 


Glucose (Fingerstick)


 


 247 mg/dL


(70-99) 


 











Microbiology


4/5/20 Blood Culture - Final, Complete


         NO GROWTH AFTER 5 DAYS


4/4/20 Urine Culture - Final, Complete


         


4/4/20 Urine Culture Result 1 (ANSON) - Final, Complete





Medication


Medications





Current Medications


Linezolid/Dextrose 300 ml @  300 mls/hr Q12HR IV  Last administered on 4/10/20at

12:14;  Start 4/10/20 at 11:00


Sodium Chloride 90 meq/Potassium Phosphate 15 mmol/ Magnesium Sulfate 12 

meq/Calcium Gluconate 15 meq/ Multivitamins 10 ml/Chromium/ Copper/Manganese/ 

Seleni/Zn 0.5 ml/ Insulin Human Regular 30 unit/ Total Parenteral 

Nutrition/Amino Acids/Dextrose/ Fat Emulsion Intravenous 1,400 ml @  58.333 mls/

hr TPN  CONT IV ;  Start 4/10/20 at 22:00;  Stop 4/11/20 at 21:59


Sodium Chloride 90 meq/Potassium Phosphate 5 mmol/ Magnesium Sulfate 12 

meq/Calcium Gluconate 15 meq/ Multivitamins 10 ml/Chromium/ Copper/Manganese/ 

Seleni/Zn 0.5 ml/ Insulin Human Regular 30 unit/ Total Parenteral 

Nutrition/Amino Acids/Dextrose/ Fat Emulsion Intravenous 1,400 ml @  58.333 mls/

hr TPN  CONT IV  Last administered on 4/9/20at 22:08;  Start 4/9/20 at 22:00;  

Stop 4/10/20 at 21:59





Comment


Review of Relevant


I have reviewed the following items josy (where applicable) has been applied.











DORYS LANDA MD                 Apr 10, 2020 13:11

## 2020-04-10 NOTE — PDOC
Objective:


Objective:


D/w nurse - stable, no change.


Vital Signs:





                                   Vital Signs








  Date Time  Temp Pulse Resp B/P (MAP) Pulse Ox O2 Delivery O2 Flow Rate FiO2


 


4/10/20 08:30  95 18 149/69 (95) 98 Ventilator  


 


4/10/20 08:00 100.1       





 100.1       








Labs:





Laboratory Tests








Test


 4/9/20


14:50 4/9/20


18:30 4/10/20


00:21 4/10/20


06:00


 


Glucose (Fingerstick) 157 mg/dL  189 mg/dL  218 mg/dL  


 


White Blood Count    13.1 x10^3/uL 


 


Red Blood Count    2.32 x10^6/uL 


 


Hemoglobin    7.4 g/dL 


 


Hematocrit    23.1 % 


 


Mean Corpuscular Volume    100 fL 


 


Mean Corpuscular Hemoglobin    32 pg 


 


Mean Corpuscular Hemoglobin


Concent 


 


 


 32 g/dL 





 


Red Cell Distribution Width    19.5 % 


 


Platelet Count    341 x10^3/uL 


 


Neutrophils (%) (Auto)    60 % 


 


Lymphocytes (%) (Auto)    17 % 


 


Monocytes (%) (Auto)    13 % 


 


Eosinophils (%) (Auto)    9 % 


 


Basophils (%) (Auto)    1 % 


 


Neutrophils # (Auto)    7.9 x10^3/uL 


 


Lymphocytes # (Auto)    2.3 x10^3/uL 


 


Monocytes # (Auto)    1.7 x10^3/uL 


 


Eosinophils # (Auto)    1.1 x10^3/uL 


 


Basophils # (Auto)    0.1 x10^3/uL 


 


Sodium Level    137 mmol/L 


 


Potassium Level    3.8 mmol/L 


 


Chloride Level    99 mmol/L 


 


Carbon Dioxide Level    30 mmol/L 


 


Anion Gap    8 


 


Blood Urea Nitrogen    49 mg/dL 


 


Creatinine    2.3 mg/dL 


 


Estimated GFR


(Cockcroft-Gault) 


 


 


 22.5 





 


Glucose Level    209 mg/dL 


 


Calcium Level    8.4 mg/dL 


 


Phosphorus Level    2.6 mg/dL 


 


Albumin    3.0 g/dL 


 


Test


 4/10/20


09:00 


 


 





 


O2 Saturation 95 %    


 


Arterial Blood pH 7.43    


 


Arterial Blood pCO2 at


Patient Temp 44 mmHg 


 


 


 





 


Arterial Blood pO2 at Patient


Temp 81 mmHg 


 


 


 





 


Arterial Blood HCO3 28 mmol/L    


 


Arterial Blood Base Excess 4 mmol/L    


 


FiO2 35% vent    








Imaging:


CT A/P 4/9/20


IMPRESSION:


1. Increased ascites.


2. Persistent evidence of necrotizing pancreatitis with fluid and phlegmon at 

the pancreas


3. Cholelithiasis with thickening of the gallbladder wall.


4. Persistent pleural effusions and atelectasis in the lung bases.





PE:





GEN: NAD


LUNGS: trach/vent, clear


HEART: borderline tachycardic


ABD: distended


NEURO/PSYCH: sedated





A/P:


Gallstone pancreatitis, MOSF





--


Interval CT as above, will review w/ Dr. Bhatia.





Hemodynamically unstable?:  No


Is patient in severe pain?:  No


Is NPO status required?:  Yes











RAGHAVENDRA MURCIA         Apr 10, 2020 10:00

## 2020-04-10 NOTE — PDOC
PROGRESS NOTES


Assessment


Assessment


Respiratory failure.


Seizure.


Metabolic encephalopathy.


Fever 102.7 degree, recurrent fever noted.


Metabolic acidosis.


Diffuse pulmonary infiltrate.


Pleural effusion.


Pancreatitis, necrotizing.


Gallstone.


Leukocytosis.


Lymphopenia.


Electrolytes imbalances.


Hyperglycemia.


DM.


HTN.


HLD.


Anemia.


Abnormal CXR.


Obesity.


Covid-19 still suspected.





RECOMMENDATIONS/PLAN:


Continue life support in CCU at the present time.


Keppra if has further seizures.


Treat medical diseases.


OT/PT.





EEG: No seizure activity.





OBJECTIVE:


Fever 100.6 degree before and now T 100.1 degree.


4/10/20: No seizures reported over night.





Past Medical History


Cardiovascular:  HTN, Hyperlipidemia


Endocrine:  Diabetes





Family History


Unobtainable.





Social HistoryU


not obtainable





Allergies


Coded Allergies:  


Codeine (Verified  Allergy, Intermediate, rash, 3/16/20)





ROS


Unobtainable.





NEUROLOGICAL  EXAMINATION:


Decreased responsiveness.


Not oriented to time, place and person.


PERRL.


EOMI not active.


CN: no acute focal findings.


Muscle tone: Decreased.


Muscle strength: No movements to stimuli.


DTR: 1 UE, 2+ LE.


Plantar reflex: Neutral response bilaterally 


Gait: not able to walk.


Sensory exam: no response to stimuli..


Not able to access cerebellar signs.


F-T-N test not performed due to decreased response. 


Patient examined in CCU.





Objective


Objective





Vital Signs








  Date Time  Temp Pulse Resp B/P (MAP) Pulse Ox O2 Delivery O2 Flow Rate FiO2


 


4/10/20 12:46  99 22 106/52 (70) 100 Ventilator  


 


4/10/20 12:00 98.2       





 98.2       














Intake and Output 


 


 4/10/20





 06:59


 


Intake Total 2354 ml


 


Output Total 390 ml


 


Balance 1964 ml


 


 


 


Intake Oral 0 ml


 


IV Total 2354 ml


 


Output Urine Total 340 ml


 


Gastric Drainage Total 50 ml











Vitals Signs


Vitals





                          VS - Last 72 Hours, by Label








  Date Time  Temp Pulse Resp B/P (MAP) Pulse Ox O2 Delivery O2 Flow Rate FiO2


 


4/10/20 12:46  99 22 106/52 (70) 100 Ventilator  


 


4/10/20 12:00 98.2 100 18 106/59 (75) 99 Ventilator  





 98.2       


 


4/10/20 11:00  88 18 110/58 (75) 99 Ventilator  


 


4/10/20 10:00  92 18 107/51 (69) 98 Ventilator  


 


4/10/20 09:00  89 18 119/52 (74) 97 Ventilator  


 


4/10/20 08:30  95 18 149/69 (95) 98 Ventilator  


 


4/10/20 08:26   18  97 Ventilator  


 


4/10/20 08:15  101 18 71/36 (48) 98 Ventilator  


 


4/10/20 08:00     98 Ventilator  


 


4/10/20 08:00      Mechanical Ventilator  


 


4/10/20 08:00 100.1 88 18 118/57 (77) 98 Ventilator  





 100.1       


 


4/10/20 07:51   18  98 Ventilator  


 


4/10/20 07:00  90 18 121/61 (81) 98 Ventilator  


 


4/10/20 06:00  92 18 120/58 (78) 98 Ventilator  


 


4/10/20 05:00  88 18 101/58 (72) 98 Ventilator  


 


4/10/20 04:05     98 Ventilator  


 


4/10/20 04:00      Mechanical Ventilator  


 


4/10/20 04:00 100.9 94 18 109/57 (74) 97 Ventilator  





 100.9       


 


4/10/20 03:00  96 18 111/57 (75) 98 Ventilator  


 


4/10/20 02:00  96 18 111/59 (76) 98 Ventilator  


 


4/10/20 01:00  100 18 116/57 (76) 97 Ventilator  


 


4/10/20 00:05     98 Ventilator  


 


4/10/20 00:00 101.9 98 18 107/55 (72) 98 Ventilator  





 101.9       


 


4/10/20 00:00      Mechanical Ventilator  


 


4/9/20 23:00  96 18 100/52 (68) 98 Ventilator  


 


4/9/20 22:15  100 18 114/53 (73) 97 Ventilator  


 


4/9/20 22:00 101.2 100 18 130/58 (82) 97 Ventilator  





 101.2       


 


4/9/20 21:00  102 18 118/58 (78) 97 Ventilator  


 


4/9/20 20:06     98 Ventilator  


 


4/9/20 20:00 100.9 98 18 122/65 (84) 98 Ventilator  





 100.9       


 


4/9/20 20:00      Mechanical Ventilator  


 


4/9/20 19:00  102 20 101/59 (73) 98 Ventilator  


 


4/9/20 18:00  134 20 91/70 (77) 99 Ventilator  


 


4/9/20 17:00  106 17 125/63 (83) 100 Ventilator  


 


4/9/20 16:00  96 17 124/66 (85) 99 Ventilator  


 


4/9/20 15:00  90 18 86/56 (66) 99 Ventilator  


 


4/9/20 15:00      Mechanical Ventilator  


 


4/9/20 14:48     100 Ventilator  


 


4/9/20 14:30 99.4 98 18 112/56 (74) 100 Ventilator  





 99.4       


 


4/9/20 13:00  94 18 106/64 (78) 100 Ventilator  


 


4/9/20 12:15  96 18 110/56 (74) 100 Ventilator  


 


4/9/20 12:09     100 Ventilator  


 


4/9/20 10:56      Ventilator  


 


4/9/20 10:26     98   


 


4/9/20 08:08     98 Ventilator  


 


4/9/20 08:00 99.8 95 18 94/53 (67) 97 Ventilator  





 99.8       


 


4/9/20 08:00      Mechanical Ventilator  


 


4/9/20 07:00  96 18 108/59 (75) 98 Ventilator  











Laboratory


Laboratory





Laboratory Tests








Test


 4/9/20


14:50 4/9/20


18:30 4/10/20


00:21 4/10/20


06:00


 


Glucose (Fingerstick)


 157 mg/dL


(70-99) 189 mg/dL


(70-99) 218 mg/dL


(70-99) 





 


White Blood Count


 


 


 


 13.1 x10^3/uL


(4.0-11.0)


 


Red Blood Count


 


 


 


 2.32 x10^6/uL


(3.50-5.40)


 


Hemoglobin


 


 


 


 7.4 g/dL


(12.0-15.5)


 


Hematocrit


 


 


 


 23.1 %


(36.0-47.0)


 


Mean Corpuscular Volume


 


 


 


 100 fL


()


 


Mean Corpuscular Hemoglobin    32 pg (25-35) 


 


Mean Corpuscular Hemoglobin


Concent 


 


 


 32 g/dL


(31-37)


 


Red Cell Distribution Width


 


 


 


 19.5 %


(11.5-14.5)


 


Platelet Count


 


 


 


 341 x10^3/uL


(140-400)


 


Neutrophils (%) (Auto)    60 % (31-73) 


 


Lymphocytes (%) (Auto)    17 % (24-48) 


 


Monocytes (%) (Auto)    13 % (0-9) 


 


Eosinophils (%) (Auto)    9 % (0-3) 


 


Basophils (%) (Auto)    1 % (0-3) 


 


Neutrophils # (Auto)


 


 


 


 7.9 x10^3/uL


(1.8-7.7)


 


Lymphocytes # (Auto)


 


 


 


 2.3 x10^3/uL


(1.0-4.8)


 


Monocytes # (Auto)


 


 


 


 1.7 x10^3/uL


(0.0-1.1)


 


Eosinophils # (Auto)


 


 


 


 1.1 x10^3/uL


(0.0-0.7)


 


Basophils # (Auto)


 


 


 


 0.1 x10^3/uL


(0.0-0.2)


 


Sodium Level


 


 


 


 137 mmol/L


(136-145)


 


Potassium Level


 


 


 


 3.8 mmol/L


(3.5-5.1)


 


Chloride Level


 


 


 


 99 mmol/L


()


 


Carbon Dioxide Level


 


 


 


 30 mmol/L


(21-32)


 


Anion Gap    8 (6-14) 


 


Blood Urea Nitrogen


 


 


 


 49 mg/dL


(7-20)


 


Creatinine


 


 


 


 2.3 mg/dL


(0.6-1.0)


 


Estimated GFR


(Cockcroft-Gault) 


 


 


 22.5 





 


Glucose Level


 


 


 


 209 mg/dL


(70-99)


 


Calcium Level


 


 


 


 8.4 mg/dL


(8.5-10.1)


 


Phosphorus Level


 


 


 


 2.6 mg/dL


(2.6-4.7)


 


Albumin


 


 


 


 3.0 g/dL


(3.4-5.0)


 


Test


 4/10/20


09:00 4/10/20


12:29 


 





 


O2 Saturation 95 % (92-99)    


 


Arterial Blood pH


 7.43


(7.35-7.45) 


 


 





 


Arterial Blood pCO2 at


Patient Temp 44 mmHg


(35-46) 


 


 





 


Arterial Blood pO2 at Patient


Temp 81 mmHg


() 


 


 





 


Arterial Blood HCO3


 28 mmol/L


(21-28) 


 


 





 


Arterial Blood Base Excess


 4 mmol/L


(-3-3) 


 


 





 


FiO2 35% vent    


 


Glucose (Fingerstick)


 


 247 mg/dL


(70-99) 


 











Microbiology


4/5/20 Blood Culture - Final, Complete


         NO GROWTH AFTER 5 DAYS


4/4/20 Urine Culture - Final, Complete


         


4/4/20 Urine Culture Result 1 (ANSON) - Final, Complete





Medication


Medications





Current Medications


Linezolid/Dextrose 300 ml @  300 mls/hr Q12HR IV  Last administered on 4/10/20at

12:14;  Start 4/10/20 at 11:00


Sodium Chloride 90 meq/Potassium Phosphate 15 mmol/ Magnesium Sulfate 12 

meq/Calcium Gluconate 15 meq/ Multivitamins 10 ml/Chromium/ Copper/Manganese/ 

Seleni/Zn 0.5 ml/ Insulin Human Regular 30 unit/ Total Parenteral 

Nutrition/Amino Acids/Dextrose/ Fat Emulsion Intravenous 1,400 ml @  58.333 mls/

hr TPN  CONT IV ;  Start 4/10/20 at 22:00;  Stop 4/11/20 at 21:59


Sodium Chloride 90 meq/Potassium Phosphate 5 mmol/ Magnesium Sulfate 12 

meq/Calcium Gluconate 15 meq/ Multivitamins 10 ml/Chromium/ Copper/Manganese/ 

Seleni/Zn 0.5 ml/ Insulin Human Regular 30 unit/ Total Parenteral 

Nutrition/Amino Acids/Dextrose/ Fat Emulsion Intravenous 1,400 ml @  58.333 mls/

hr TPN  CONT IV  Last administered on 4/9/20at 22:08;  Start 4/9/20 at 22:00;  

Stop 4/10/20 at 21:59





Comment


Review of Relevant


I have reviewed the following items josy (where applicable) has been applied.











DORYS LANDA MD                 Apr 10, 2020 13:33

## 2020-04-10 NOTE — PDOC
PROGRESS NOTES


Chief Complaint


Chief Complaint


Respiratory failure requiring mechanical ventilation


bilateral pleural effusions/pulm edema 


Sepsis


Severe Acute gallstone pancreatitis (not a surgical candidate at this time) with

necrosis


Acute kidney failure now requiring dialysis


Salpingitis


Gallstones (Calculus of gallbladder with acute cholecystitis without 

obstruction)


HTN 


Leukocytosis 


Hypoxia


Uterine fibroid


Intractable pain


Intractable nausea


Covid 19 negative. 


Acute on chronic anemia 


EEG: No seizure activity.


ESRD on HD





Plan:


continue supportive measures


dialysis per nephro


continue meropenam, dapto, capsofungin


follow cultures


continue levophed support as needed 


seizures, neuro following


no plans for sx at present


defer to sx regarding diet


awaiting for placement, she will probably will need to transition to LTAC





History of Present Illness


History of Present Illness


4/9/20: patient seen in ICU tolerating dialysis. sedated on vent. no acute 

issues overnight. no fever today. 





4/7/2020


No acute events reported overnight, case discussed with nursing staff patient in

no acute distress no complaints during my visit, most likely patient will be 

moving to LTAC soon





4/6/2020


Plans for trach int he am and dialysis in the afternoon, no acute events 

reported overnight. 





4/5/2020


No acute events reported overnight, case discussed with nursing staff patient in

no acute distress during my visit





4/4/2020


No acute events reported overnight, patient receiving dialysis today, case 

discussed with nursing staff patient in no acute distress during my visit








4/3/2020


No new changes remains critically ill, off CRRT, still planning for trach on 

Monday unless we manage to wean over the weekend





4/2/2020


Continues to remain critically ill.  The patient unable to be taken off 

ventilatory support as of yet.,  Discussed with pulmonary consultant.  Planning 

all tracheostomy on Monday if he is unable to be weaned off vent





4/1/2020


No acute events reported overnight, no fever or chills, remains critically 

stable, discussed with mother at bedside. 





[id number]


Patient seen and examined in the ICU


She is critically ill


Mechanically ventilated


Assist-control/25/450/30 with 8 of PEEP


She is on cefepime daptomycin Flagyl and micafungin GEN for antibiotic coverage


Also getting TPN and CRRT


Sedated with Versed


Getting IV albumin also


She is tachycardic at 112 bpm


Temp max 100.0


White blood count is down from 45,000 35,000 today


Chart reviewed


Discussed with RN











[id number]


Patient seen and examined in the ICU


She remains very critically ill


Going for a CT of the abdomen chest and pelvis


Ventilated : On assist control 40% FiO2


Discussed with RN


Chart reviewed








[id number]


Patient seen and examined in the ICU


She is still on CRRT although we plan to change to hemodialysis soon


Chart reviewed


Discussed with RN


Hemoglobin down to 6.9 (suspect CRRT related , Will let nephrology consider 

transfusion while on dialysis)








[id number]


Patient seen and examined in the ICU


She is mechanically ventilated


Before meals/25/450/30%


Also on CRRT


Very critically ill


Chart reviewed


Discussed with RN











[id number]


Patient seen and examined in the ICU


She is now been transferred to the Covid 19 unit so we can rule out Covid and 

keep her in isolation


Very critically ill


Intubated and  ventilated


Assist control 40%


Chart reviewed


Discussed with RN


Still on CRRT


Getting a chest x-ray now


Very concerned about her prognosis








[id number]


Patient seen and examined in the ICU


She is extremely critically ill


Remains mechanically ventilated with assist control/25/450/40%


Also on continuous renal replacement therapy


Chart reviewed


Discussed with RN


She has TPN hanging


Also on pressors











[id number]


Patient seen and examined in the ICU


She is still requiring CRRT


On the vent with assist control but her FiO2 is down to 40% from yesterday


Slightly better but still very critically ill


Discussed with RN


Chart reviewed








[id number]


Patient seen and examined in the ICU


She remains extremely critically ill


On IV fentanyl IV Versed IV Doxy


On the vent


Assist-control/25/450/70%


She is critically ill


Discussed with RN


Chart reviewed


Patient is currently on CRRT as well








[id number]


Patient seen and examined in the ICU


She had to be intubated this morning


On assist-control 25/450/100% with 10 of PEEP and only satting 87%


She is extremely critically ill


I'm not sure if she will survive


Chart reviewed


Discussed with RN











Ms Tadeo is a 50yo F w/ PMHx HTN, prediabetes who presents the emergency room

complaints of abdominal pain. Patient described off and on 3 days. She states is

constant, described as a squeezing sensation in a band-like distribution. + 

nausea, vomiting.  She denies any fever or diarrhea.  Patient denies any 

abdominal surgical procedures.  She states is worse with movements, car ride.  

Pain initially was upper abdomen however now pretty much generalized.  Last 

bowel movement was 3/15/2020. Nothing makes her pain better.  Patient denies any

shortness of breath.  She does state the pain moves into her chest.  Denies any 

headache or visual changes.


Lipase 61979, , , Bilirubin 1.4.


CT abdomen confirms pancreatic inflammation, peripancreatic fluid and 

inflammatory changes around the pancreas consistent with pancreatitis. 

Cholelithiasis and 1.4cm uterine fibroid as well as possible left salpingitis. 

Admitted for further care


GI, General surgery, ID, Pulm consulted.





3/17: Overnight per report no urine output. Added dilaudid for pain, PICC placed

per IR. Renal US negative.Seen bedside in ICU, given 2L additional NSS and 

albumin infusion. Still hypotensive, started on levophed. Repeat CT abdomen with

necrosis. Updated her fiancee


3/18: Sats are only 87% on nasal cannula oxygen. Dialysis catheter per 

nephrology


3/19: She is now on BiPAP appears more ill, now on dialysis


3/20: Seen on BiPAP. Her mother and another family member are present and seemed

to be good support for her. Currently on dialysis. Appears critically ill


3/21: Overnight Tmax 101.7 , still on BiPAP FiO2 40%, still on low dose Levophed

gtt, TPN initiated. On dialysis





Overnight still febrile. Dialysis today. She wakes up and responds to pain. No 

CP.





Vitals


Vitals





Vital Signs








  Date Time  Temp Pulse Resp B/P (MAP) Pulse Ox O2 Delivery O2 Flow Rate FiO2


 


4/10/20 14:00  100 20 122/55 (77) 99 Ventilator  


 


4/10/20 12:00 98.2       





 98.2       











Physical Exam


Physical Exam


GENERAL: Orally intubated/sedated - generalized anasarca 


HEENT: Pupils equal, ETT, dobhoff +


NECK:  Supple 


LUNGS: Diminished aeration bases 


HEART:  S1, S2, regular 


ABDOMEN:  Distended, hypoactive BS, Rectal tube in place 


: [place]   


EXTREMITIES: Generalized edema, no cyanosis - some mottling, Rooke boots & SCDs 

bilaterally 


DERMATOLOGIC:  Warm and dry.  No generalized rash.  


CENTRAL NERVOUS SYSTEM: Sedated 


RIJ Temp HDC, RUE-PICC  


Lt IJ removed 4/7


Chestwall line present


General:  No acute distress


Heart:  Other (increased rate)


Lungs:  Other (dimished in BLL)


Abdomen:  Soft, No tenderness


Extremities:  No edema, Other (SOME CLUBBING )


Skin:  Other (mottling noted to extremities )





Labs


LABS





Laboratory Tests








Test


 4/9/20


14:50 4/9/20


18:30 4/10/20


00:21 4/10/20


06:00


 


Glucose (Fingerstick)


 157 mg/dL


(70-99) 189 mg/dL


(70-99) 218 mg/dL


(70-99) 





 


White Blood Count


 


 


 


 13.1 x10^3/uL


(4.0-11.0)


 


Red Blood Count


 


 


 


 2.32 x10^6/uL


(3.50-5.40)


 


Hemoglobin


 


 


 


 7.4 g/dL


(12.0-15.5)


 


Hematocrit


 


 


 


 23.1 %


(36.0-47.0)


 


Mean Corpuscular Volume


 


 


 


 100 fL


()


 


Mean Corpuscular Hemoglobin    32 pg (25-35) 


 


Mean Corpuscular Hemoglobin


Concent 


 


 


 32 g/dL


(31-37)


 


Red Cell Distribution Width


 


 


 


 19.5 %


(11.5-14.5)


 


Platelet Count


 


 


 


 341 x10^3/uL


(140-400)


 


Neutrophils (%) (Auto)    60 % (31-73) 


 


Lymphocytes (%) (Auto)    17 % (24-48) 


 


Monocytes (%) (Auto)    13 % (0-9) 


 


Eosinophils (%) (Auto)    9 % (0-3) 


 


Basophils (%) (Auto)    1 % (0-3) 


 


Neutrophils # (Auto)


 


 


 


 7.9 x10^3/uL


(1.8-7.7)


 


Lymphocytes # (Auto)


 


 


 


 2.3 x10^3/uL


(1.0-4.8)


 


Monocytes # (Auto)


 


 


 


 1.7 x10^3/uL


(0.0-1.1)


 


Eosinophils # (Auto)


 


 


 


 1.1 x10^3/uL


(0.0-0.7)


 


Basophils # (Auto)


 


 


 


 0.1 x10^3/uL


(0.0-0.2)


 


Sodium Level


 


 


 


 137 mmol/L


(136-145)


 


Potassium Level


 


 


 


 3.8 mmol/L


(3.5-5.1)


 


Chloride Level


 


 


 


 99 mmol/L


()


 


Carbon Dioxide Level


 


 


 


 30 mmol/L


(21-32)


 


Anion Gap    8 (6-14) 


 


Blood Urea Nitrogen


 


 


 


 49 mg/dL


(7-20)


 


Creatinine


 


 


 


 2.3 mg/dL


(0.6-1.0)


 


Estimated GFR


(Cockcroft-Gault) 


 


 


 22.5 





 


Glucose Level


 


 


 


 209 mg/dL


(70-99)


 


Calcium Level


 


 


 


 8.4 mg/dL


(8.5-10.1)


 


Phosphorus Level


 


 


 


 2.6 mg/dL


(2.6-4.7)


 


Albumin


 


 


 


 3.0 g/dL


(3.4-5.0)


 


Test


 4/10/20


09:00 4/10/20


12:29 


 





 


O2 Saturation 95 % (92-99)    


 


Arterial Blood pH


 7.43


(7.35-7.45) 


 


 





 


Arterial Blood pCO2 at


Patient Temp 44 mmHg


(35-46) 


 


 





 


Arterial Blood pO2 at Patient


Temp 81 mmHg


() 


 


 





 


Arterial Blood HCO3


 28 mmol/L


(21-28) 


 


 





 


Arterial Blood Base Excess


 4 mmol/L


(-3-3) 


 


 





 


FiO2 35% vent    


 


Glucose (Fingerstick)


 


 247 mg/dL


(70-99) 


 














Assessment and Plan


Assessmemt and Plan


Problems


Medical Problems:


(1) Acute pancreatitis


Status: Acute  





(2) Cholelithiasis


Status: Acute  











Comment


Review of Relevant


I have reviewed the following items josy (where applicable) has been applied.


Labs





Laboratory Tests








Test


 4/8/20


17:45 4/9/20


00:03 4/9/20


06:15 4/9/20


08:00


 


Glucose (Fingerstick)


 197 mg/dL


(70-99) 206 mg/dL


(70-99) 203 mg/dL


(70-99) 





 


White Blood Count


 


 


 12.0 x10^3/uL


(4.0-11.0) 





 


Red Blood Count


 


 


 2.30 x10^6/uL


(3.50-5.40) 





 


Hemoglobin


 


 


 7.4 g/dL


(12.0-15.5) 





 


Hematocrit


 


 


 22.9 %


(36.0-47.0) 





 


Mean Corpuscular Volume


 


 


 100 fL


() 





 


Mean Corpuscular Hemoglobin   32 pg (25-35)  


 


Mean Corpuscular Hemoglobin


Concent 


 


 32 g/dL


(31-37) 





 


Red Cell Distribution Width


 


 


 20.1 %


(11.5-14.5) 





 


Platelet Count


 


 


 328 x10^3/uL


(140-400) 





 


Sodium Level


 


 


 137 mmol/L


(136-145) 





 


Potassium Level


 


 


 3.7 mmol/L


(3.5-5.1) 





 


Chloride Level


 


 


 99 mmol/L


() 





 


Carbon Dioxide Level


 


 


 29 mmol/L


(21-32) 





 


Anion Gap   9 (6-14)  


 


Blood Urea Nitrogen


 


 


 51 mg/dL


(7-20) 





 


Creatinine


 


 


 2.2 mg/dL


(0.6-1.0) 





 


Estimated GFR


(Cockcroft-Gault) 


 


 23.7 


 





 


Glucose Level


 


 


 211 mg/dL


(70-99) 





 


Calcium Level


 


 


 8.7 mg/dL


(8.5-10.1) 





 


Phosphorus Level


 


 


 3.6 mg/dL


(2.6-4.7) 





 


Magnesium Level


 


 


 2.1 mg/dL


(1.8-2.4) 





 


Albumin


 


 


 2.6 g/dL


(3.4-5.0) 





 


Triglycerides Level


 


 


 436 mg/dL


(0-150) 





 


O2 Saturation    92 % (92-99) 


 


Arterial Blood pH


 


 


 


 7.40


(7.35-7.45)


 


Arterial Blood pCO2 at


Patient Temp 


 


 


 45 mmHg


(35-46)


 


Arterial Blood pO2 at Patient


Temp 


 


 


 69 mmHg


()


 


Arterial Blood HCO3


 


 


 


 27 mmol/L


(21-28)


 


Arterial Blood Base Excess


 


 


 


 2 mmol/L


(-3-3)


 


FiO2    35 


 


Test


 4/9/20


14:50 4/9/20


18:30 4/10/20


00:21 4/10/20


06:00


 


Glucose (Fingerstick)


 157 mg/dL


(70-99) 189 mg/dL


(70-99) 218 mg/dL


(70-99) 





 


White Blood Count


 


 


 


 13.1 x10^3/uL


(4.0-11.0)


 


Red Blood Count


 


 


 


 2.32 x10^6/uL


(3.50-5.40)


 


Hemoglobin


 


 


 


 7.4 g/dL


(12.0-15.5)


 


Hematocrit


 


 


 


 23.1 %


(36.0-47.0)


 


Mean Corpuscular Volume


 


 


 


 100 fL


()


 


Mean Corpuscular Hemoglobin    32 pg (25-35) 


 


Mean Corpuscular Hemoglobin


Concent 


 


 


 32 g/dL


(31-37)


 


Red Cell Distribution Width


 


 


 


 19.5 %


(11.5-14.5)


 


Platelet Count


 


 


 


 341 x10^3/uL


(140-400)


 


Neutrophils (%) (Auto)    60 % (31-73) 


 


Lymphocytes (%) (Auto)    17 % (24-48) 


 


Monocytes (%) (Auto)    13 % (0-9) 


 


Eosinophils (%) (Auto)    9 % (0-3) 


 


Basophils (%) (Auto)    1 % (0-3) 


 


Neutrophils # (Auto)


 


 


 


 7.9 x10^3/uL


(1.8-7.7)


 


Lymphocytes # (Auto)


 


 


 


 2.3 x10^3/uL


(1.0-4.8)


 


Monocytes # (Auto)


 


 


 


 1.7 x10^3/uL


(0.0-1.1)


 


Eosinophils # (Auto)


 


 


 


 1.1 x10^3/uL


(0.0-0.7)


 


Basophils # (Auto)


 


 


 


 0.1 x10^3/uL


(0.0-0.2)


 


Sodium Level


 


 


 


 137 mmol/L


(136-145)


 


Potassium Level


 


 


 


 3.8 mmol/L


(3.5-5.1)


 


Chloride Level


 


 


 


 99 mmol/L


()


 


Carbon Dioxide Level


 


 


 


 30 mmol/L


(21-32)


 


Anion Gap    8 (6-14) 


 


Blood Urea Nitrogen


 


 


 


 49 mg/dL


(7-20)


 


Creatinine


 


 


 


 2.3 mg/dL


(0.6-1.0)


 


Estimated GFR


(Cockcroft-Gault) 


 


 


 22.5 





 


Glucose Level


 


 


 


 209 mg/dL


(70-99)


 


Calcium Level


 


 


 


 8.4 mg/dL


(8.5-10.1)


 


Phosphorus Level


 


 


 


 2.6 mg/dL


(2.6-4.7)


 


Albumin


 


 


 


 3.0 g/dL


(3.4-5.0)


 


Test


 4/10/20


09:00 4/10/20


12:29 


 





 


O2 Saturation 95 % (92-99)    


 


Arterial Blood pH


 7.43


(7.35-7.45) 


 


 





 


Arterial Blood pCO2 at


Patient Temp 44 mmHg


(35-46) 


 


 





 


Arterial Blood pO2 at Patient


Temp 81 mmHg


() 


 


 





 


Arterial Blood HCO3


 28 mmol/L


(21-28) 


 


 





 


Arterial Blood Base Excess


 4 mmol/L


(-3-3) 


 


 





 


FiO2 35% vent    


 


Glucose (Fingerstick)


 


 247 mg/dL


(70-99) 


 











Laboratory Tests








Test


 4/9/20


14:50 4/9/20


18:30 4/10/20


00:21 4/10/20


06:00


 


Glucose (Fingerstick)


 157 mg/dL


(70-99) 189 mg/dL


(70-99) 218 mg/dL


(70-99) 





 


White Blood Count


 


 


 


 13.1 x10^3/uL


(4.0-11.0)


 


Red Blood Count


 


 


 


 2.32 x10^6/uL


(3.50-5.40)


 


Hemoglobin


 


 


 


 7.4 g/dL


(12.0-15.5)


 


Hematocrit


 


 


 


 23.1 %


(36.0-47.0)


 


Mean Corpuscular Volume


 


 


 


 100 fL


()


 


Mean Corpuscular Hemoglobin    32 pg (25-35) 


 


Mean Corpuscular Hemoglobin


Concent 


 


 


 32 g/dL


(31-37)


 


Red Cell Distribution Width


 


 


 


 19.5 %


(11.5-14.5)


 


Platelet Count


 


 


 


 341 x10^3/uL


(140-400)


 


Neutrophils (%) (Auto)    60 % (31-73) 


 


Lymphocytes (%) (Auto)    17 % (24-48) 


 


Monocytes (%) (Auto)    13 % (0-9) 


 


Eosinophils (%) (Auto)    9 % (0-3) 


 


Basophils (%) (Auto)    1 % (0-3) 


 


Neutrophils # (Auto)


 


 


 


 7.9 x10^3/uL


(1.8-7.7)


 


Lymphocytes # (Auto)


 


 


 


 2.3 x10^3/uL


(1.0-4.8)


 


Monocytes # (Auto)


 


 


 


 1.7 x10^3/uL


(0.0-1.1)


 


Eosinophils # (Auto)


 


 


 


 1.1 x10^3/uL


(0.0-0.7)


 


Basophils # (Auto)


 


 


 


 0.1 x10^3/uL


(0.0-0.2)


 


Sodium Level


 


 


 


 137 mmol/L


(136-145)


 


Potassium Level


 


 


 


 3.8 mmol/L


(3.5-5.1)


 


Chloride Level


 


 


 


 99 mmol/L


()


 


Carbon Dioxide Level


 


 


 


 30 mmol/L


(21-32)


 


Anion Gap    8 (6-14) 


 


Blood Urea Nitrogen


 


 


 


 49 mg/dL


(7-20)


 


Creatinine


 


 


 


 2.3 mg/dL


(0.6-1.0)


 


Estimated GFR


(Cockcroft-Gault) 


 


 


 22.5 





 


Glucose Level


 


 


 


 209 mg/dL


(70-99)


 


Calcium Level


 


 


 


 8.4 mg/dL


(8.5-10.1)


 


Phosphorus Level


 


 


 


 2.6 mg/dL


(2.6-4.7)


 


Albumin


 


 


 


 3.0 g/dL


(3.4-5.0)


 


Test


 4/10/20


09:00 4/10/20


12:29 


 





 


O2 Saturation 95 % (92-99)    


 


Arterial Blood pH


 7.43


(7.35-7.45) 


 


 





 


Arterial Blood pCO2 at


Patient Temp 44 mmHg


(35-46) 


 


 





 


Arterial Blood pO2 at Patient


Temp 81 mmHg


() 


 


 





 


Arterial Blood HCO3


 28 mmol/L


(21-28) 


 


 





 


Arterial Blood Base Excess


 4 mmol/L


(-3-3) 


 


 





 


FiO2 35% vent    


 


Glucose (Fingerstick)


 


 247 mg/dL


(70-99) 


 











Microbiology


4/5/20 Blood Culture - Final, Complete


         NO GROWTH AFTER 5 DAYS


4/4/20 Urine Culture - Final, Complete


         


4/4/20 Urine Culture Result 1 ([facility]) - Final, Complete


Medications





Current Medications


Sodium Chloride 1,000 ml @  1,000 mls/hr Q1H IV  Last administered on 3/16/20at 

03:00;  Start 3/16/20 at 03:00;  Stop 3/16/20 at 03:59;  Status DC


Ondansetron HCl (Zofran) 4 mg 1X  ONCE IVP  Last administered on 3/16/20at 

03:27;  Start 3/16/20 at 03:00;  Stop 3/16/20 at 03:01;  Status DC


Morphine Sulfate (Morphine Sulfate) 4 mg 1X  ONCE IV ;  Start 3/16/20 at 03:00; 

Stop 3/16/20 at 03:01;  Status Cancel


Ketorolac Tromethamine (Toradol 30mg Vial) 30 mg 1X  ONCE IV  Last administered 

on 3/16/20at 02:54;  Start 3/16/20 at 03:00;  Stop 3/16/20 at 03:01;  Status DC


Fentanyl Citrate (Fentanyl 2ml Vial) 25 mcg 1X  ONCE IVP  Last administered on 

3/16/20at 03:23;  Start 3/16/20 at 03:30;  Stop 3/16/20 at 03:31;  Status DC


Fentanyl Citrate (Fentanyl 2ml Vial) 100 mcg STK-MED ONCE .ROUTE ;  Start 

3/16/20 at 03:18;  Stop 3/16/20 at 03:18;  Status DC


Iohexol (Omnipaque 350 Mg/ml) 90 ml 1X  ONCE IV  Last administered on 3/16/20at 

03:25;  Start 3/16/20 at 03:30;  Stop 3/16/20 at 03:31;  Status DC


Info (CONTRAST GIVEN -- Rx MONITORING) 1 each PRN DAILY  PRN MC SEE COMMENTS;  

Start 3/16/20 at 03:30;  Stop 3/18/20 at 03:29;  Status DC


Hydromorphone HCl (Dilaudid) 0.5 mg 1X  ONCE IV  Last administered on 3/16/20at 

03:55;  Start 3/16/20 at 04:30;  Stop 3/16/20 at 04:32;  Status DC


Ondansetron HCl (Zofran) 4 mg PRN Q8HRS  PRN IV NAUSEA/VOMITING 1ST CHOICE;  

Start 3/16/20 at 05:00;  Stop 3/16/20 at 09:27;  Status DC


Morphine Sulfate (Morphine Sulfate) 2 mg PRN Q2HR  PRN IV SEVERE PAIN 7-10 Last 

administered on 3/17/20at 12:26;  Start 3/16/20 at 05:00;  Stop 3/17/20 at 14:

15;  Status DC


Sodium Chloride 1,000 ml @  125 mls/hr Q8H IV  Last administered on 3/16/20at 

20:56;  Start 3/16/20 at 05:00;  Stop 3/17/20 at 04:59;  Status DC


Hydromorphone HCl (Dilaudid) 0.5 mg PRN Q3HRS  PRN IV SEVERE PAIN 7-10 Last 

administered on 3/17/20at 10:06;  Start 3/16/20 at 05:00;  Stop 3/17/20 at 

12:01;  Status DC


Piperacillin Sod/ Tazobactam Sod 4.5 gm/Sodium Chloride 100 ml @  200 mls/hr 1X 

ONCE IV  Last administered on 3/16/20at 05:44;  Start 3/16/20 at 06:00;  Stop 

3/16/20 at 06:29;  Status DC


Ondansetron HCl (Zofran) 4 mg PRN Q4HRS  PRN IV NAUSEA/VOMITING 1ST CHOICE Last 

administered on 3/21/20at 16:15;  Start 3/16/20 at 09:30


Insulin Human Lispro (HumaLOG) 0-9 UNITS Q6HRS SQ  Last administered on 

4/10/20at 12:35;  Start 3/16/20 at 09:30


Dextrose (Dextrose 50%-Water Syringe) 12.5 gm PRN Q15MIN  PRN IV SEE COMMENTS;  

Start 3/16/20 at 09:30


Pantoprazole Sodium (PROTONIX VIAL for IV PUSH) 40 mg DAILYAC IVP  Last 

administered on 4/10/20at 08:05;  Start 3/16/20 at 11:30


Prochlorperazine Edisylate (Compazine) 10 mg PRN Q6HRS  PRN IV NAUSEA/VOMITING, 

2nd CHOICE Last administered on 3/17/20at 00:42;  Start 3/16/20 at 17:45


Atenolol (Tenormin) 100 mg DAILY PO ;  Start 3/17/20 at 09:00;  Stop 3/16/20 at 

20:08;  Status DC


Metoprolol Tartrate (Lopressor Vial) 2.5 mg Q6HRS IVP  Last administered on 

3/17/20at 05:51;  Start 3/16/20 at 20:15;  Stop 3/17/20 at 10:02;  Status DC


Metoprolol Tartrate (Lopressor Vial) 5 mg Q6HRS IVP  Last administered on 

3/26/20at 00:12;  Start 3/17/20 at 10:15;  Stop 3/28/20 at 08:48;  Status DC


Hydromorphone HCl (Dilaudid) 1 mg PRN Q3HRS  PRN IV SEVERE PAIN 7-10 Last 

administered on 3/23/20at 05:13;  Start 3/17/20 at 12:00;  Stop 3/31/20 at 

00:25;  Status DC


Lidocaine HCl (Buffered Lidocaine 1%) 3 ml STK-MED ONCE .ROUTE ;  Start 3/17/20 

at 12:55;  Stop 3/17/20 at 12:56;  Status DC


Albumin Human 500 ml @  125 mls/hr 1X  ONCE IV  Last administered on 3/17/20at 

14:33;  Start 3/17/20 at 14:30;  Stop 3/17/20 at 18:32;  Status DC


Norepinephrine Bitartrate 8 mg/ Dextrose 258 ml @  17.299 mls/ hr CONT  PRN IV 

PER PROTOCOL Last administered on 4/10/20at 13:31;  Start 3/17/20 at 15:30


Sodium Chloride 1,000 ml @  125 mls/hr Q8H IV  Last administered on 3/17/20at 

21:04;  Start 3/17/20 at 16:00;  Stop 3/18/20 at 02:42;  Status DC


Albumin Human 500 ml @  125 mls/hr PRN BID  PRN IV After every 2L NSS & BP < 

90mm Last administered on 4/1/20at 14:21;  Start 3/17/20 at 16:00


Iohexol (Omnipaque 300 Mg/ml) 60 ml 1X  ONCE IV  Last administered on 3/17/20at 

17:20;  Start 3/17/20 at 17:00;  Stop 3/17/20 at 17:01;  Status DC


Info (CONTRAST GIVEN -- Rx MONITORING) 1 each PRN DAILY  PRN MC SEE COMMENTS;  

Start 3/17/20 at 17:00;  Stop 3/19/20 at 16:59;  Status DC


Meropenem 1 gm/ Sodium Chloride 100 ml @  200 mls/hr Q8HRS IV  Last administered

on 3/18/20at 05:45;  Start 3/17/20 at 20:00;  Stop 3/18/20 at 08:48;  Status DC


Furosemide (Lasix) 40 mg 1X  ONCE IVP  Last administered on 3/17/20at 22:12;  

Start 3/17/20 at 22:30;  Stop 3/17/20 at 22:31;  Status DC


Calcium Chloride 1000 mg/Sodium Chloride 110 ml @  220 mls/hr 1X  ONCE IV  Last 

administered on 3/17/20at 22:11;  Start 3/17/20 at 22:30;  Stop 3/17/20 at 

22:59;  Status DC


Albuterol Sulfate (Ventolin Neb Soln) 2.5 mg 1X  ONCE NEB  Last administered on 

3/18/20at 00:56;  Start 3/17/20 at 22:30;  Stop 3/17/20 at 22:31;  Status DC


Insulin Human Regular (HumuLIN R VIAL) 5 unit 1X  ONCE IV  Last administered on 

3/17/20at 22:14;  Start 3/17/20 at 22:30;  Stop 3/17/20 at 22:31;  Status DC


Magnesium Sulfate 50 ml @ 25 mls/hr 1X  ONCE IV  Last administered on 3/18/20at 

02:57;  Start 3/18/20 at 03:00;  Stop 3/18/20 at 04:59;  Status DC


Calcium Gluconate 1000 mg/Sodium Chloride 110 ml @  220 mls/hr 1X  ONCE IV  Last

administered on 3/18/20at 02:46;  Start 3/18/20 at 03:00;  Stop 3/18/20 at 

03:29;  Status DC


Sodium Chloride 1,000 ml @  200 mls/hr Q5H IV  Last administered on 3/18/20at 

02:46;  Start 3/18/20 at 03:00;  Stop 3/18/20 at 10:21;  Status DC


Calcium Gluconate 1000 mg/Sodium Chloride 110 ml @  220 mls/hr 1X  ONCE IV  Last

administered on 3/18/20at 03:21;  Start 3/18/20 at 03:30;  Stop 3/18/20 at 

03:59;  Status DC


Sodium Bicarbonate 50 meq/Sodium Chloride 1,050 ml @  75 mls/hr Q14H IV  Last ad

ministered on 3/22/20at 21:10;  Start 3/18/20 at 07:30;  Stop 3/23/20 at 10:28; 

Status DC


Calcium Gluconate 2000 mg/Sodium Chloride 120 ml @  220 mls/hr 1X  ONCE IV  Last

administered on 3/18/20at 09:05;  Start 3/18/20 at 07:30;  Stop 3/18/20 at 

08:02;  Status DC


Lidocaine HCl (Xylocaine-Mpf 1% 2ml Vial) 2 ml STK-MED ONCE .ROUTE ;  Start 

3/18/20 at 08:47;  Stop 3/18/20 at 08:47;  Status DC


Meropenem 500 mg/ Sodium Chloride 50 ml @  100 mls/hr Q12HR IV  Last 

administered on 3/23/20at 21:01;  Start 3/18/20 at 18:00;  Stop 3/24/20 at 

07:58;  Status DC


Lidocaine HCl (Buffered Lidocaine 1%) 3 ml STK-MED ONCE .ROUTE ;  Start 3/18/20 

at 09:46;  Stop 3/18/20 at 09:46;  Status DC


Lidocaine HCl (Buffered Lidocaine 1%) 6 ml 1X  ONCE INJ  Last administered on 

3/18/20at 10:26;  Start 3/18/20 at 10:15;  Stop 3/18/20 at 10:16;  Status DC


Info (Tpn Per Pharmacy) 1 each PRN DAILY  PRN MC SEE COMMENTS Last administered 

on 4/10/20at 11:58;  Start 3/18/20 at 12:00


Sodium Chloride 1,000 ml @  1,000 mls/hr Q1H PRN IV hypotension;  Start 3/18/20 

at 12:07;  Stop 3/18/20 at 18:06;  Status DC


Diphenhydramine HCl (Benadryl) 25 mg 1X PRN  PRN IV ITCHING;  Start 3/18/20 at 

12:15;  Stop 3/19/20 at 12:14;  Status DC


Diphenhydramine HCl (Benadryl) 25 mg 1X PRN  PRN IV ITCHING;  Start 3/18/20 at 

12:15;  Stop 3/19/20 at 12:14;  Status DC


Sodium Chloride 1,000 ml @  400 mls/hr Q2H30M PRN IV PATENCY;  Start 3/18/20 at 

12:07;  Stop 3/19/20 at 00:06;  Status DC


Info (PHARMACY MONITORING -- do not chart) 1 each PRN DAILY  PRN MC SEE 

COMMENTS;  Start 3/18/20 at 12:15;  Stop 3/20/20 at 08:13;  Status DC


Sodium Chloride 90 meq/Calcium Gluconate 10 meq/ Multivitamins 10 ml/Chromium/ 

Copper/Manganese/ Seleni/Zn 1 ml/ Total Parenteral Nutrition/Amino 

Acids/Dextrose/ Fat Emulsion Intravenous 55.005 ml  @ 2.292 mls/hr TPN  CONT IV 

;  Start 3/18/20 at 22:00;  Stop 3/18/20 at 12:33;  Status DC


Info (Tpn Per Pharmacy) 1 each PRN DAILY  PRN MC SEE COMMENTS;  Start 3/18/20 at

12:30;  Status UNV


Sodium Chloride 90 meq/Calcium Gluconate 10 meq/ Multivitamins 10 ml/Chromium/ 

Copper/Manganese/ Seleni/Zn 0.5 ml/ Total Parenteral Nutrition/Amino 

Acids/Dextrose/ Fat Emulsion Intravenous 1,512 ml @  63 mls/hr TPN  CONT IV  

Last administered on 3/18/20at 22:06;  Start 3/18/20 at 22:00;  Stop 3/19/20 at 

21:59;  Status DC


Calcium Carbonate/ Glycine (Tums) 500 mg PRN AFTMEALHC  PRN PO INDIGESTION;  

Start 3/18/20 at 17:45


Calcium Gluconate (Calcium Gluconate) 2,000 mg 1X  ONCE IVP  Last administered 

on 3/19/20at 02:19;  Start 3/19/20 at 02:15;  Stop 3/19/20 at 02:16;  Status DC


Calcium Chloride 3000 mg/Sodium Chloride 1,030 ml @  50 mls/hr [id number] IV  Last 

administered on 3/21/20at 02:17;  Start 3/19/20 at 08:00;  Stop 3/21/20 at 

15:23;  Status DC


Lorazepam (Ativan Inj) 1 mg PRN Q4HRS  PRN IVP ANXIETY / AGITATION Last 

administered on 3/23/20at 00:34;  Start 3/19/20 at 09:00


Sodium Chloride 1,000 ml @  1,000 mls/hr Q1H PRN IV hypotension;  Start 3/19/20 

at 08:56;  Stop 3/19/20 at 14:55;  Status DC


Albumin Human 200 ml @  200 mls/hr 1X PRN  PRN IV Hypotension;  Start 3/19/20 at

09:00;  Stop 3/19/20 at 14:59;  Status DC


Diphenhydramine HCl (Benadryl) 25 mg 1X PRN  PRN IV ITCHING;  Start 3/19/20 at 

09:00;  Stop 3/20/20 at 08:59;  Status DC


Diphenhydramine HCl (Benadryl) 25 mg 1X PRN  PRN IV ITCHING;  Start 3/19/20 at 

09:00;  Stop 3/20/20 at 08:59;  Status DC


Sodium Chloride 1,000 ml @  400 mls/hr Q2H30M PRN IV PATENCY;  Start 3/19/20 at 

08:56;  Stop 3/19/20 at 20:55;  Status DC


Info (PHARMACY MONITORING -- do not chart) 1 each PRN DAILY  PRN MC SEE 

COMMENTS;  Start 3/19/20 at 09:00;  Status UNV


Info (PHARMACY MONITORING -- do not chart) 1 each PRN DAILY  PRN MC SEE 

COMMENTS;  Start 3/19/20 at 09:00;  Stop 3/20/20 at 08:13;  Status DC


Digoxin (Lanoxin) 500 mcg 1X  ONCE IV  Last administered on 3/19/20at 10:04;  

Start 3/19/20 at 10:00;  Stop 3/19/20 at 10:01;  Status DC


Digoxin (Lanoxin) 125 mcg 1X  ONCE IV  Last administered on 3/19/20at 17:10;  

Start 3/19/20 at 18:00;  Stop 3/19/20 at 18:01;  Status DC


Magnesium Sulfate 100 ml @  25 mls/hr 1X  ONCE IV  Last administered on 

3/19/20at 12:48;  Start 3/19/20 at 13:00;  Stop 3/19/20 at 16:59;  Status DC


Sodium Chloride 90 meq/Magnesium Sulfate 10 meq/ Calcium Gluconate 20 meq/ 

Multivitamins 10 ml/Chromium/ Copper/Manganese/ Seleni/Zn 0.5 ml/ Total 

Parenteral Nutrition/Amino Acids/Dextrose/ Fat Emulsion Intravenous 1,512 ml @  

63 mls/hr TPN  CONT IV  Last administered on 3/19/20at 22:25;  Start 3/19/20 at 

22:00;  Stop 3/20/20 at 21:59;  Status DC


Sodium Chloride 1,000 ml @  1,000 mls/hr Q1H PRN IV hypotension;  Start 3/20/20 

at 08:05;  Stop 3/20/20 at 14:04;  Status DC


Albumin Human 200 ml @  200 mls/hr 1X  ONCE IV  Last administered on 3/20/20at 

08:57;  Start 3/20/20 at 08:15;  Stop 3/20/20 at 09:14;  Status DC


Diphenhydramine HCl (Benadryl) 25 mg 1X PRN  PRN IV ITCHING;  Start 3/20/20 at 

08:15;  Stop 3/21/20 at 08:14;  Status DC


Diphenhydramine HCl (Benadryl) 25 mg 1X PRN  PRN IV ITCHING;  Start 3/20/20 at 

08:15;  Stop 3/21/20 at 08:14;  Status DC


Sodium Chloride 1,000 ml @  400 mls/hr Q2H30M PRN IV PATENCY;  Start 3/20/20 at 

08:05;  Stop 3/20/20 at 20:04;  Status DC


Info (PHARMACY MONITORING -- do not chart) 1 each PRN DAILY  PRN MC SEE 

COMMENTS;  Start 3/20/20 at 08:15;  Stop 3/24/20 at 07:57;  Status DC


Sodium Chloride 90 meq/Potassium Chloride 15 meq/ Potassium Phosphate 10 mmol/ 

Magnesium Sulfate 10 meq/Calcium Gluconate 20 meq/ Multivitamins 10 ml/Chromium/

Copper/Manganese/ Seleni/Zn 0.5 ml/ Total Parenteral Nutrition/Amino 

Acids/Dextrose/ Fat Emulsion Intravenous 1,512 ml @  63 mls/hr TPN  CONT IV  

Last administered on 3/20/20at 21:01;  Start 3/20/20 at 22:00;  Stop 3/21/20 at 

21:59;  Status DC


Potassium Chloride/Water 100 ml @  100 mls/hr 1X  ONCE IV  Last administered on 

3/20/20at 14:09;  Start 3/20/20 at 14:00;  Stop 3/20/20 at 14:59;  Status DC


Benzocaine (Hurricaine One) 1 spray 1X  ONCE MM  Last administered on 3/20/20at 

16:38;  Start 3/20/20 at 14:30;  Stop 3/20/20 at 14:31;  Status DC


Lidocaine HCl (Glydo (Lidocaine) Jelly) 1 thomas 1X  ONCE MM  Last administered on 

3/20/20at 16:38;  Start 3/20/20 at 14:30;  Stop 3/20/20 at 14:31;  Status DC


Linezolid/Dextrose 300 ml @  300 mls/hr Q12HR IV  Last administered on 3/26/20at

21:04;  Start 3/20/20 at 20:00;  Stop 3/27/20 at 07:50;  Status DC


Acetaminophen (Tylenol) 650 mg PRN Q6HRS  PRN PO MILD PAIN / TEMP;  Start 

3/21/20 at 03:30;  Stop 3/21/20 at 03:36;  Status DC


Acetaminophen (Tylenol) 650 mg PRN Q6HRS  PRN PEG MILD PAIN / TEMP Last 

administered on 3/26/20at 07:35;  Start 3/21/20 at 03:36


Sodium Chloride 1,000 ml @  1,000 mls/hr Q1H PRN IV hypotension;  Start 3/21/20 

at 07:50;  Stop 3/21/20 at 13:49;  Status DC


Albumin Human 200 ml @  200 mls/hr 1X PRN  PRN IV Hypotension;  Start 3/21/20 at

08:00;  Stop 3/21/20 at 13:59;  Status DC


Sodium Chloride (Normal Saline Flush) 10 ml 1X PRN  PRN IV AP catheter pack;  

Start 3/21/20 at 08:00;  Stop 3/22/20 at 07:59;  Status DC


Sodium Chloride (Normal Saline Flush) 10 ml 1X PRN  PRN IV  catheter pack;  

Start 3/21/20 at 08:00;  Stop 3/22/20 at 07:59;  Status DC


Sodium Chloride 1,000 ml @  400 mls/hr Q2H30M PRN IV PATENCY;  Start 3/21/20 at 

07:50;  Stop 3/21/20 at 19:49;  Status DC


Info (PHARMACY MONITORING -- do not chart) 1 each PRN DAILY  PRN MC SEE 

COMMENTS;  Start 3/21/20 at 08:00;  Status UNV


Info (PHARMACY MONITORING -- do not chart) 1 each PRN DAILY  PRN MC SEE 

COMMENTS;  Start 3/21/20 at 08:00;  Stop 3/23/20 at 08:25;  Status DC


Sodium Chloride 90 meq/Potassium Chloride 15 meq/ Potassium Phosphate 10 mmol/ 

Magnesium Sulfate 10 meq/Calcium Gluconate 20 meq/ Multivitamins 10 ml/Chromium/

Copper/Manganese/ Seleni/Zn 0.5 ml/ Total Parenteral Nutrition/Amino 

Acids/Dextrose/ Fat Emulsion Intravenous 1,512 ml @  63 mls/hr TPN  CONT IV  

Last administered on 3/21/20at 20:57;  Start 3/21/20 at 22:00;  Stop 3/22/20 at 

21:59;  Status DC


Sodium Chloride 90 meq/Potassium Chloride 15 meq/ Potassium Phosphate 15 mmol/ 

Magnesium Sulfate 10 meq/Calcium Gluconate 20 meq/ Multivitamins 10 ml/Chromium/

Copper/Manganese/ Seleni/Zn 0.5 ml/ Total Parenteral Nutrition/Amino 

Acids/Dextrose/ Fat Emulsion Intravenous 1,512 ml @  63 mls/hr TPN  CONT IV ;  

Start 3/22/20 at 22:00;  Stop 3/22/20 at 14:16;  Status DC


Sodium Chloride 90 meq/Potassium Chloride 15 meq/ Potassium Phosphate 15 mmol/ 

Magnesium Sulfate 10 meq/Calcium Gluconate 20 meq/ Multivitamins 10 ml/Chromium/

Copper/Manganese/ Seleni/Zn 0.5 ml/ Total Parenteral Nutrition/Amino 

Acids/Dextrose/ Fat Emulsion Intravenous 1,200 ml @  50 mls/hr TPN  CONT IV ;  

Start 3/22/20 at 22:00;  Stop 3/22/20 at 14:17;  Status DC


Sodium Chloride 90 meq/Potassium Chloride 15 meq/ Potassium Phosphate 10 mmol/ 

Magnesium Sulfate 10 meq/Calcium Gluconate 20 meq/ Multivitamins 10 ml/Chromium/

Copper/Manganese/ Seleni/Zn 0.5 ml/ Total Parenteral Nutrition/Amino Acids/

Dextrose/ Fat Emulsion Intravenous 1,200 ml @  50 mls/hr TPN  CONT IV  Last 

administered on 3/22/20at 23:29;  Start 3/22/20 at 22:00;  Stop 3/23/20 at 

21:59;  Status DC


Sodium Chloride 1,000 ml @  1,000 mls/hr Q1H PRN IV hypotension;  Start 3/23/20 

at 07:28;  Stop 3/23/20 at 13:27;  Status DC


Albumin Human 200 ml @  200 mls/hr 1X  ONCE IV  Last administered on 3/23/20at 

08:51;  Start 3/23/20 at 07:30;  Stop 3/23/20 at 08:29;  Status DC


Diphenhydramine HCl (Benadryl) 25 mg 1X PRN  PRN IV ITCHING;  Start 3/23/20 at 

07:30;  Stop 3/24/20 at 07:29;  Status DC


Diphenhydramine HCl (Benadryl) 25 mg 1X PRN  PRN IV ITCHING;  Start 3/23/20 at 

07:30;  Stop 3/24/20 at 07:29;  Status DC


Sodium Chloride 1,000 ml @  400 mls/hr Q2H30M PRN IV PATENCY;  Start 3/23/20 at 

07:28;  Stop 3/23/20 at 19:27;  Status DC


Info (PHARMACY MONITORING -- do not chart) 1 each PRN DAILY  PRN MC SEE 

COMMENTS;  Start 3/23/20 at 07:30;  Stop 4/3/20 at 13:01;  Status DC


Metronidazole 100 ml @  100 mls/hr Q6HRS IV  Last administered on 4/8/20at 06:2

6;  Start 3/23/20 at 08:30;  Stop 4/8/20 at 09:58;  Status DC


Micafungin Sodium 100 mg/Dextrose 100 ml @  100 mls/hr Q24H IV  Last 

administered on 4/10/20at 08:05;  Start 3/23/20 at 09:00


Propofol 0 ml @ As Directed STK-MED ONCE IV ;  Start 3/23/20 at 07:53;  Stop 

3/23/20 at 07:53;  Status DC


Etomidate (Amidate) 20 mg STK-MED ONCE IV ;  Start 3/23/20 at 07:53;  Stop 

3/23/20 at 07:54;  Status DC


Midazolam HCl (Versed) 5 mg STK-MED ONCE .ROUTE ;  Start 3/23/20 at 07:57;  Stop

3/23/20 at 07:57;  Status DC


Fentanyl Citrate 30 ml @ 0 mls/hr CONT  PRN IV SEE PROTOCOL Last administered on

4/10/20at 07:51;  Start 3/23/20 at 08:15


Artificial Tears (Artificial Tears) 1 drop PRN Q1HR  PRN OU DRY EYE, 1st choice;

 Start 3/23/20 at 08:15


Midazolam HCl 50 mg/Sodium Chloride 50 ml @ 0 mls/hr CONT  PRN IV SEE PROTOCOL 

Last administered on 3/26/20at 22:39;  Start 3/23/20 at 08:15;  Stop 3/28/20 at 

15:59;  Status DC


Etomidate (Amidate) 8 mg 1X  ONCE IV  Last administered on 3/23/20at 08:33;  

Start 3/23/20 at 08:30;  Stop 3/23/20 at 08:31;  Status DC


Succinylcholine Chloride (Anectine) 120 mg 1X  ONCE IV  Last administered on 

3/23/20at 08:34;  Start 3/23/20 at 08:30;  Stop 3/23/20 at 08:31;  Status DC


Midazolam HCl (Versed) 5 mg 1X  ONCE IV ;  Start 3/23/20 at 08:30;  Stop 3/23/20

at 08:31;  Status DC


Potassium Chloride 15 meq/ Bicarbonate Dialysis Soln w/ out KCl 5,007.5 ml  @ 

1,000 mls/ hr Q5H1M IV  Last administered on 3/24/20at 11:11;  Start 3/23/20 at 

12:00;  Stop 3/24/20 at 11:15;  Status DC


Potassium Chloride 15 meq/ Bicarbonate Dialysis Soln w/ out KCl 5,007.5 ml  @ 

1,000 mls/ hr Q5H1M IV  Last administered on 3/24/20at 11:12;  Start 3/23/20 at 

12:00;  Stop 3/24/20 at 11:17;  Status DC


Potassium Chloride 15 meq/ Bicarbonate Dialysis Soln w/ out KCl 5,007.5 ml  @ 

1,000 mls/ hr Q5H1M IV  Last administered on 3/24/20at 11:11;  Start 3/23/20 at 

12:00;  Stop 3/24/20 at 11:19;  Status DC


Sodium Chloride 90 meq/Potassium Chloride 15 meq/ Potassium Phosphate 10 mmol/ 

Magnesium Sulfate 10 meq/Calcium Gluconate 20 meq/ Multivitamins 10 ml/Chromium/

Copper/Manganese/ Seleni/Zn 0.5 ml/ Total Parenteral Nutrition/Amino 

Acids/Dextrose/ Fat Emulsion Intravenous 1,400 ml @  58.333 mls/ hr TPN  CONT IV

 Last administered on 3/23/20at 21:42;  Start 3/23/20 at 22:00;  Stop 3/24/20 at

21:59;  Status DC


Heparin Sodium (Porcine) (Heparin Sodium) 5,000 unit Q8HRS SQ  Last administered

on 3/28/20at 05:55;  Start 3/23/20 at 15:00;  Stop 3/28/20 at 13:28;  Status DC


Meropenem 500 mg/ Sodium Chloride 50 ml @  100 mls/hr Q6HRS IV  Last 

administered on 3/25/20at 06:00;  Start 3/24/20 at 09:00;  Stop 3/25/20 at 

07:29;  Status DC


Potassium Phosphate 20 mmol/ Sodium Chloride 106.6667 ml @  51.667 m... 1X  ONCE

IV  Last administered on 3/24/20at 11:22;  Start 3/24/20 at 10:15;  Stop 3/24/20

at 12:18;  Status DC


Acetaminophen (Tylenol Supp) 650 mg PRN Q6HRS  PRN [place] MILD PAIN / TEMP Last 

administered on 4/9/20at 22:30;  Start 3/24/20 at 10:30


Potassium Chloride/Water 100 ml @  100 mls/hr Q1H IV  Last administered on 

3/24/20at 12:12;  Start 3/24/20 at 11:00;  Stop 3/24/20 at 12:59;  Status DC


Potassium Chloride 20 meq/ Bicarbonate Dialysis Soln w/ out KCl 5,010 ml @  

1,000 mls/hr Q5H1M IV  Last administered on 3/25/20at 08:48;  Start 3/24/20 at 

12:00;  Stop 3/25/20 at 13:03;  Status DC


Potassium Chloride 20 meq/ Bicarbonate Dialysis Soln w/ out KCl 5,010 ml @  

1,000 mls/hr Q5H1M IV  Last administered on 3/29/20at 14:52;  Start 3/24/20 at 1

1:30;  Stop 3/29/20 at 19:59;  Status DC


Potassium Chloride 20 meq/ Bicarbonate Dialysis Soln w/ out KCl 5,010 ml @  

1,000 mls/hr Q5H1M IV  Last administered on 3/29/20at 14:53;  Start 3/24/20 at 

11:30;  Stop 3/29/20 at 19:59;  Status DC


Sodium Chloride 90 meq/Potassium Chloride 15 meq/ Potassium Phosphate 15 mmol/ 

Magnesium Sulfate 10 meq/Calcium Gluconate 15 meq/ Multivitamins 10 ml/Chromium/

Copper/Manganese/ Seleni/Zn 0.5 ml/ Total Parenteral Nutrition/Amino 

Acids/Dextrose/ Fat Emulsion Intravenous 1,400 ml @  58.333 mls/ hr TPN  CONT IV

 Last administered on 3/24/20at 22:17;  Start 3/24/20 at 22:00;  Stop 3/25/20 at

21:59;  Status DC


Cefepime HCl (Maxipime) 2 gm Q12HR IVP  Last administered on 4/7/20at 20:56;  

Start 3/25/20 at 09:00;  Stop 4/8/20 at 09:58;  Status DC


Daptomycin 500 mg/ Sodium Chloride 50 ml @  100 mls/hr Q48H IV  Last 

administered on 4/10/20at 09:57;  Start 3/25/20 at 08:30;  Stop 4/10/20 at 

10:07;  Status DC


Lidocaine HCl (Buffered Lidocaine 1%) 3 ml 1X  ONCE INJ  Last administered on 3

/25/20at 10:27;  Start 3/25/20 at 10:30;  Stop 3/25/20 at 10:31;  Status DC


Potassium Phosphate 20 mmol/ Sodium Chloride 106.6667 ml @  51.667 m... 1X  ONCE

IV  Last administered on 3/25/20at 12:51;  Start 3/25/20 at 13:00;  Stop 3/25/20

at 15:03;  Status DC


Sodium Chloride 90 meq/Potassium Chloride 15 meq/ Potassium Phosphate 18 mmol/ 

Magnesium Sulfate 8 meq/Calcium Gluconate 15 meq/ Multivitamins 10 ml/Chromium/ 

Copper/Manganese/ Seleni/Zn 0.5 ml/ Total Parenteral Nutrition/Amino 

Acids/Dextrose/ Fat Emulsion Intravenous 1,400 ml @  58.333 mls/ hr TPN  CONT IV

 Last administered on 3/25/20at 22:16;  Start 3/25/20 at 22:00;  Stop 3/26/20 at

21:59;  Status DC


Potassium Chloride 20 meq/ Bicarbonate Dialysis Soln w/ out KCl 5,010 ml @  

1,000 mls/hr Q5H1M IV  Last administered on 3/29/20at 14:54;  Start 3/25/20 at 

16:00;  Stop 3/29/20 at 19:59;  Status DC


Multi-Ingred Cream/Lotion/Oil/ Oint (Artificial Tears Eye Ointment) 1 thomas PRN 

Q1HR  PRN OU DRY EYE, 2nd choice Last administered on 4/3/20at 11:05;  Start 

3/25/20 at 17:30


Sodium Chloride 90 meq/Potassium Chloride 15 meq/ Potassium Phosphate 18 mmol/ 

Magnesium Sulfate 8 meq/Calcium Gluconate 15 meq/ Multivitamins 10 ml/Chromium/ 

Copper/Manganese/ Seleni/Zn 0.5 ml/ Total Parenteral Nutrition/Amino 

Acids/Dextrose/ Fat Emulsion Intravenous 1,400 ml @  58.333 mls/ hr TPN  CONT IV

 Last administered on 3/26/20at 22:00;  Start 3/26/20 at 22:00;  Stop 3/27/20 at

21:59;  Status DC


Albumin Human 500 ml @  125 mls/hr 1X  ONCE IV ;  Start 3/26/20 at 14:15;  Stop 

3/26/20 at 18:14;  Status DC


Sodium Chloride 90 meq/Potassium Chloride 15 meq/ Potassium Phosphate 18 mmol/ 

Magnesium Sulfate 8 meq/Calcium Gluconate 15 meq/ Multivitamins 10 ml/Chromium/ 

Copper/Manganese/ Seleni/Zn 0.5 ml/ Insulin Human Regular 10 unit/ Total Lisa

ral Nutrition/Amino Acids/Dextrose/ Fat Emulsion Intravenous 1,400 ml @  58.333 

mls/ hr TPN  CONT IV  Last administered on 3/27/20at 21:43;  Start 3/27/20 at 

22:00;  Stop 3/28/20 at 21:59;  Status DC


Lidocaine HCl (Buffered Lidocaine 1%) 3 ml STK-MED ONCE .ROUTE ;  Start 3/25/20 

at 10:00;  Stop 3/27/20 at 13:57;  Status DC


Midazolam HCl 100 mg/Sodium Chloride 100 ml @ 7 mls/hr CONT  PRN IV SEE PROTOCOL

Last administered on 4/8/20at 15:35;  Start 3/28/20 at 16:00


Sodium Chloride 90 meq/Potassium Chloride 15 meq/ Potassium Phosphate 18 mmol/ 

Magnesium Sulfate 8 meq/Calcium Gluconate 15 meq/ Multivitamins 10 ml/Chromium/ 

Copper/Manganese/ Seleni/Zn 0.5 ml/ Insulin Human Regular 15 unit/ Total 

Parenteral Nutrition/Amino Acids/Dextrose/ Fat Emulsion Intravenous 1,400 ml @  

58.333 mls/ hr TPN  CONT IV  Last administered on 3/28/20at 20:34;  Start 

3/28/20 at 22:00;  Stop 3/29/20 at 21:59;  Status DC


Info ([facility] Electrolyte Protocol) 1 ea CONT PRN  PRN MC PER PROTOCOL;  Start 

3/29/20 at 13:15


Sodium Chloride 90 meq/Potassium Chloride 15 meq/ Potassium Phosphate 18 mmol/ 

Magnesium Sulfate 8 meq/Calcium Gluconate 15 meq/ Multivitamins 10 ml/Chromium/ 

Copper/Manganese/ Seleni/Zn 0.5 ml/ Insulin Human Regular 15 unit/ Total 

Parenteral Nutrition/Amino Acids/Dextrose/ Fat Emulsion Intravenous 1,400 ml @  

58.333 mls/ hr TPN  CONT IV  Last administered on 3/29/20at 22:05;  Start 

3/29/20 at 22:00;  Stop 3/30/20 at 21:59;  Status DC


Potassium Chloride 15 meq/ Bicarbonate Dialysis Soln w/ out KCl 5,007.5 ml  @ 

1,000 mls/ hr Q5H1M IV  Last administered on 4/1/20at 18:14;  Start 3/29/20 at 

20:00;  Stop 4/2/20 at 13:08;  Status DC


Potassium Chloride 15 meq/ Bicarbonate Dialysis Soln w/ out KCl 5,007.5 ml  @ 

1,000 mls/ hr Q5H1M IV  Last administered on 4/1/20at 18:14;  Start 3/29/20 at 

20:00;  Stop 4/2/20 at 13:08;  Status DC


Potassium Chloride 15 meq/ Bicarbonate Dialysis Soln w/ out KCl 5,007.5 ml  @ 

1,000 mls/ hr Q5H1M IV  Last administered on 4/1/20at 18:14;  Start 3/29/20 at 

20:00;  Stop 4/2/20 at 13:08;  Status DC


Iohexol (Omnipaque 240 Mg/ml) 30 ml 1X  ONCE PO  Last administered on 3/30/20at 

11:30;  Start 3/30/20 at 11:30;  Stop 3/30/20 at 11:33;  Status DC


Info (CONTRAST GIVEN -- Rx MONITORING) 1 each PRN DAILY  PRN MC SEE COMMENTS;  

Start 3/30/20 at 11:45;  Stop 4/1/20 at 11:44;  Status DC


Sodium Chloride 90 meq/Potassium Chloride 15 meq/ Potassium Phosphate 18 mmol/ 

Magnesium Sulfate 8 meq/Calcium Gluconate 15 meq/ Multivitamins 10 ml/Chromium/ 

Copper/Manganese/ Seleni/Zn 0.5 ml/ Insulin Human Regular 15 unit/ Total 

Parenteral Nutrition/Amino Acids/Dextrose/ Fat Emulsion Intravenous 1,400 ml @  

58.333 mls/ hr TPN  CONT IV  Last administered on 3/30/20at 21:47;  Start 

3/30/20 at 22:00;  Stop 3/31/20 at 21:59;  Status DC


Sodium Chloride 90 meq/Potassium Chloride 15 meq/ Potassium Phosphate 18 mmol/ 

Magnesium Sulfate 8 meq/Calcium Gluconate 15 meq/ Multivitamins 10 ml/Chromium/ 

Copper/Manganese/ Seleni/Zn 0.5 ml/ Insulin Human Regular 20 unit/ Total 

Parenteral Nutrition/Amino Acids/Dextrose/ Fat Emulsion Intravenous 1,400 ml @  

58.333 mls/ hr TPN  CONT IV  Last administered on 3/31/20at 21:36;  Start 

3/31/20 at 22:00;  Stop 4/1/20 at 21:59;  Status DC


Alteplase, Recombinant (Cathflo For Central Catheter Clearance) 1 mg 1X  ONCE 

INT CAT  Last administered on 3/31/20at 20:03;  Start 3/31/20 at 19:30;  Stop 

3/31/20 at 19:46;  Status DC


Alteplase, Recombinant (Cathflo For Central Catheter Clearance) 1 mg 1X  ONCE I

NT CAT  Last administered on 3/31/20at 22:05;  Start 3/31/20 at 22:00;  Stop 

3/31/20 at 22:01;  Status DC


Sodium Chloride 90 meq/Potassium Chloride 15 meq/ Potassium Phosphate 18 mmol/ 

Magnesium Sulfate 8 meq/Calcium Gluconate 15 meq/ Multivitamins 10 ml/Chromium/ 

Copper/Manganese/ Seleni/Zn 0.5 ml/ Insulin Human Regular 20 unit/ Total 

Parenteral Nutrition/Amino Acids/Dextrose/ Fat Emulsion Intravenous 1,400 ml @  

58.333 mls/ hr TPN  CONT IV  Last administered on 4/1/20at 21:30;  Start 4/1/20 

at 22:00;  Stop 4/2/20 at 21:59;  Status DC


Dexmedetomidine HCl 400 mcg/ Sodium Chloride 100 ml @ 0 mls/hr CONT  PRN IV 

ANXIETY / AGITATION Last administered on 4/10/20at 13:30;  Start 4/2/20 at 08:15


Sodium Chloride 500 ml @  500 mls/hr 1X PRN  PRN IV ELEVATED BP, SEE COMMENTS;  

Start 4/2/20 at 08:15


Atropine Sulfate (ATROPINE 0.5mg SYRINGE) 0.5 mg PRN Q5MIN  PRN IV SEE COMMENTS;

 Start 4/2/20 at 08:15


Furosemide (Lasix) 20 mg 1X  ONCE IVP  Last administered on 4/2/20at 08:19;  

Start 4/2/20 at 08:15;  Stop 4/2/20 at 08:16;  Status DC


Lidocaine HCl (Buffered Lidocaine 1%) 3 ml STK-MED ONCE .ROUTE ;  Start 4/2/20 

at 08:39;  Stop 4/2/20 at 08:39;  Status DC


Lidocaine HCl (Buffered Lidocaine 1%) 6 ml 1X  ONCE INJ  Last administered on 

4/2/20at 09:05;  Start 4/2/20 at 09:00;  Stop 4/2/20 at 09:06;  Status DC


Sodium Chloride 90 meq/Potassium Chloride 15 meq/ Potassium Phosphate 18 mmol/ 

Magnesium Sulfate 8 meq/Calcium Gluconate 15 meq/ Multivitamins 10 ml/Chromium/ 

Copper/Manganese/ Seleni/Zn 0.5 ml/ Insulin Human Regular 20 unit/ Total 

Parenteral Nutrition/Amino Acids/Dextrose/ Fat Emulsion Intravenous 1,400 ml @  

58.333 mls/ hr TPN  CONT IV  Last administered on 4/2/20at 22:45;  Start 4/2/20 

at 22:00;  Stop 4/3/20 at 21:59;  Status DC


Sodium Chloride 1,000 ml @  1,000 mls/hr Q1H PRN IV hypotension;  Start 4/3/20 

at 07:30;  Stop 4/3/20 at 13:29;  Status DC


Albumin Human 200 ml @  200 mls/hr 1X PRN  PRN IV Hypotension Last administered 

on 4/3/20at 09:36;  Start 4/3/20 at 07:30;  Stop 4/3/20 at 13:29;  Status DC


Sodium Chloride (Normal Saline Flush) 10 ml 1X PRN  PRN IV AP catheter pack;  

Start 4/3/20 at 07:30;  Stop 4/3/20 at 21:29;  Status DC


Sodium Chloride (Normal Saline Flush) 10 ml 1X PRN  PRN IV  catheter pack;  

Start 4/3/20 at 07:30;  Stop 4/4/20 at 07:29;  Status DC


Sodium Chloride 1,000 ml @  400 mls/hr Q2H30M PRN IV PATENCY;  Start 4/3/20 at 

07:30;  Stop 4/3/20 at 19:29;  Status DC


Info (PHARMACY MONITORING -- do not chart) 1 each PRN DAILY  PRN MC SEE 

COMMENTS;  Start 4/3/20 at 07:30;  Stop 4/3/20 at 13:02;  Status DC


Info (PHARMACY MONITORING -- do not chart) 1 each PRN DAILY  PRN MC SEE 

COMMENTS;  Start 4/3/20 at 07:30;  Stop 4/5/20 at 12:45;  Status DC


Sodium Chloride 90 meq/Potassium Chloride 15 meq/ Potassium Phosphate 10 mmol/ 

Magnesium Sulfate 8 meq/Calcium Gluconate 15 meq/ Multivitamins 10 ml/Chromium/ 

Copper/Manganese/ Seleni/Zn 0.5 ml/ Insulin Human Regular 25 unit/ Total 

Parenteral Nutrition/Amino Acids/Dextrose/ Fat Emulsion Intravenous 1,400 ml @  

58.333 mls/ hr TPN  CONT IV  Last administered on 4/3/20at 22:19;  Start 4/3/20 

at 22:00;  Stop 4/4/20 at 21:59;  Status DC


Heparin Sodium (Porcine) (Heparin Sodium) 5,000 unit Q12HR SQ  Last administered

on 4/10/20at 09:00;  Start 4/3/20 at 21:00


Ondansetron HCl (Zofran) 4 mg PRN Q6HRS  PRN IV NAUSEA/VOMITING;  Start 4/6/20 

at 07:00;  Stop 4/7/20 at 06:59;  Status DC


Fentanyl Citrate (Fentanyl 2ml Vial) 25 mcg PRN Q5MIN  PRN IV MILD PAIN 1-3;  

Start 4/6/20 at 07:00;  Stop 4/7/20 at 06:59;  Status DC


Fentanyl Citrate (Fentanyl 2ml Vial) 50 mcg PRN Q5MIN  PRN IV MODERATE TO SEVERE

PAIN;  Start 4/6/20 at 07:00;  Stop 4/7/20 at 06:59;  Status DC


Ringer's Solution 1,000 ml @  30 mls/hr Q24H IV ;  Start 4/6/20 at 07:00;  Stop 

4/6/20 at 18:59;  Status DC


Lidocaine HCl (Xylocaine-Mpf 1% 2ml Vial) 2 ml PRN 1X  PRN ID PRIOR TO IV START;

 Start 4/6/20 at 07:00;  Stop 4/7/20 at 06:59;  Status DC


Prochlorperazine Edisylate (Compazine) 5 mg PACU PRN  PRN IV NAUSEA, MRX1;  

Start 4/6/20 at 07:00;  Stop 4/7/20 at 06:59;  Status DC


Sodium Chloride 1,000 ml @  1,000 mls/hr Q1H PRN IV hypotension;  Start 4/4/20 

at 09:10;  Stop 4/4/20 at 15:09;  Status DC


Albumin Human 200 ml @  200 mls/hr 1X PRN  PRN IV Hypotension Last administered 

on 4/4/20at 10:10;  Start 4/4/20 at 09:15;  Stop 4/4/20 at 15:14;  Status DC


Sodium Chloride 1,000 ml @  400 mls/hr Q2H30M PRN IV PATENCY;  Start 4/4/20 at 

09:10;  Stop 4/4/20 at 21:09;  Status DC


Info (PHARMACY MONITORING -- do not chart) 1 each PRN DAILY  PRN MC SEE 

COMMENTS;  Start 4/4/20 at 09:15;  Stop 4/5/20 at 12:45;  Status DC


Info (PHARMACY MONITORING -- do not chart) 1 each PRN DAILY  PRN MC SEE 

COMMENTS;  Start 4/4/20 at 09:15;  Stop 4/5/20 at 12:45;  Status DC


Sodium Chloride 90 meq/Potassium Chloride 15 meq/ Potassium Phosphate 10 mmol/ 

Magnesium Sulfate 8 meq/Calcium Gluconate 15 meq/ Multivitamins 10 ml/Chromium/ 

Copper/Manganese/ Seleni/Zn 0.5 ml/ Insulin Human Regular 25 unit/ Total 

Parenteral Nutrition/Amino Acids/Dextrose/ Fat Emulsion Intravenous 1,400 ml @  

58.333 mls/ hr TPN  CONT IV  Last administered on 4/4/20at 22:10;  Start 4/4/20 

at 22:00;  Stop 4/5/20 at 21:59;  Status DC


Magnesium Sulfate 50 ml @ 25 mls/hr PRN DAILY  PRN IV for Mag < 1.7 on am labs; 

Start 4/5/20 at 09:15


Sodium Chloride 90 meq/Potassium Chloride 15 meq/ Potassium Phosphate 10 mmol/ 

Magnesium Sulfate 8 meq/Calcium Gluconate 15 meq/ Multivitamins 10 ml/Chromium/ 

Copper/Manganese/ Seleni/Zn 0.5 ml/ Insulin Human Regular 25 unit/ Total 

Parenteral Nutrition/Amino Acids/Dextrose/ Fat Emulsion Intravenous 1,400 ml @  

58.333 mls/ hr TPN  CONT IV  Last administered on 4/5/20at 21:20;  Start 4/5/20 

at 22:00;  Stop 4/6/20 at 21:59;  Status DC


Sodium Chloride 1,000 ml @  1,000 mls/hr Q1H PRN IV hypotension;  Start 4/5/20 

at 12:23;  Stop 4/5/20 at 18:22;  Status DC


Albumin Human 200 ml @  200 mls/hr 1X  ONCE IV  Last administered on 4/5/20at 

13:34;  Start 4/5/20 at 12:30;  Stop 4/5/20 at 13:29;  Status DC


Diphenhydramine HCl (Benadryl) 25 mg 1X PRN  PRN IV ITCHING;  Start 4/5/20 at 

12:30;  Stop 4/6/20 at 12:29;  Status DC


Diphenhydramine HCl (Benadryl) 25 mg 1X PRN  PRN IV ITCHING;  Start 4/5/20 at 

12:30;  Stop 4/6/20 at 12:29;  Status DC


Info (PHARMACY MONITORING -- do not chart) 1 each PRN DAILY  PRN MC SEE 

COMMENTS;  Start 4/5/20 at 12:30;  Status Cancel


Bupivacaine HCl/ Epinephrine Bitart (Sensorcain-Epi 0.5%-1:[id number] Mpf) 30 ml 

STK-MED ONCE .ROUTE  Last administered on 4/6/20at 11:44;  Start 4/6/20 at 

11:00;  Stop 4/6/20 at 11:01;  Status DC


Cellulose (Surgicel Fibrillar 1x2) 1 each STK-MED ONCE .ROUTE ;  Start 4/6/20 at

11:00;  Stop 4/6/20 at 11:01;  Status DC


Sodium Chloride 90 meq/Potassium Chloride 15 meq/ Potassium Phosphate 10 mmol/ 

Magnesium Sulfate 12 meq/Calcium Gluconate 15 meq/ Multivitamins 10 ml/Chromium/

Copper/Manganese/ Seleni/Zn 0.5 ml/ Insulin Human Regular 25 unit/ Total 

Parenteral Nutrition/Amino Acids/Dextrose/ Fat Emulsion Intravenous 1,400 ml @  

58.333 mls/ hr TPN  CONT IV  Last administered on 4/6/20at 22:24;  Start 4/6/20 

at 22:00;  Stop 4/7/20 at 21:59;  Status DC


Propofol 20 ml @ As Directed STK-MED ONCE IV ;  Start 4/6/20 at 11:07;  Stop 

4/6/20 at 11:07;  Status DC


Cellulose (Surgicel Hemostat 4x8) 1 each STK-MED ONCE .ROUTE  Last administered 

on 4/6/20at 11:44;  Start 4/6/20 at 11:55;  Stop 4/6/20 at 11:56;  Status DC


Sevoflurane (Ultane) 60 ml STK-MED ONCE IH ;  Start 4/6/20 at 12:46;  Stop 

4/6/20 at 12:46;  Status DC


Sodium Chloride 1,000 ml @  1,000 mls/hr Q1H PRN IV hypotension;  Start 4/6/20 

at 13:51;  Stop 4/6/20 at 19:50;  Status DC


Albumin Human 200 ml @  200 mls/hr 1X PRN  PRN IV Hypotension Last administered 

on 4/6/20at 14:51;  Start 4/6/20 at 14:00;  Stop 4/6/20 at 19:59;  Status DC


Diphenhydramine HCl (Benadryl) 25 mg 1X PRN  PRN IV ITCHING;  Start 4/6/20 at 

14:00;  Stop 4/7/20 at 13:59;  Status DC


Diphenhydramine HCl (Benadryl) 25 mg 1X PRN  PRN IV ITCHING;  Start 4/6/20 at 

14:00;  Stop 4/7/20 at 13:59;  Status DC


Sodium Chloride 1,000 ml @  400 mls/hr Q2H30M PRN IV PATENCY;  Start 4/6/20 at 

13:51;  Stop 4/7/20 at 01:50;  Status DC


Info (PHARMACY MONITORING -- do not chart) 1 each PRN DAILY  PRN MC SEE 

COMMENTS;  Start 4/6/20 at 14:00;  Stop 4/9/20 at 08:16;  Status DC


Heparin Sodium (Porcine) (Hep Lock Adult) 500 unit STK-MED ONCE IVP ;  Start 

4/7/20 at 09:29;  Stop 4/7/20 at 09:30;  Status DC


Sodium Chloride 1,000 ml @  1,000 mls/hr Q1H PRN IV hypotension;  Start 4/7/20 

at 10:43;  Stop 4/7/20 at 16:42;  Status DC


Sodium Chloride 1,000 ml @  400 mls/hr Q2H30M PRN IV PATENCY;  Start 4/7/20 at 

10:43;  Stop 4/7/20 at 22:42;  Status DC


Info (PHARMACY MONITORING -- do not chart) 1 each PRN DAILY  PRN MC SEE 

COMMENTS;  Start 4/7/20 at 10:45;  Status UNV


Info (PHARMACY MONITORING -- do not chart) 1 each PRN DAILY  PRN MC SEE 

COMMENTS;  Start 4/7/20 at 10:45;  Status UNV


Sodium Chloride 90 meq/Potassium Chloride 15 meq/ Magnesium Sulfate 12 

meq/Calcium Gluconate 15 meq/ Multivitamins 10 ml/Chromium/ Copper/Manganese/ 

Seleni/Zn 0.5 ml/ Insulin Human Regular 25 unit/ Total Parenteral 

Nutrition/Amino Acids/Dextrose/ Fat Emulsion Intravenous 1,400 ml @  58.333 mls/

hr TPN  CONT IV  Last administered on 4/7/20at 22:13;  Start 4/7/20 at 22:00;  

Stop 4/8/20 at 21:59;  Status DC


Sodium Chloride 1,000 ml @  1,000 mls/hr Q1H PRN IV hypotension;  Start 4/8/20 

at 07:50;  Stop 4/8/20 at 13:49;  Status DC


Albumin Human 200 ml @  200 mls/hr 1X  ONCE IV ;  Start 4/8/20 at 08:00;  Stop 

4/8/20 at 08:53;  Status DC


Diphenhydramine HCl (Benadryl) 25 mg 1X PRN  PRN IV ITCHING;  Start 4/8/20 at 

08:00;  Stop 4/9/20 at 07:59;  Status DC


Diphenhydramine HCl (Benadryl) 25 mg 1X PRN  PRN IV ITCHING;  Start 4/8/20 at 

08:00;  Stop 4/9/20 at 07:59;  Status DC


Info (PHARMACY MONITORING -- do not chart) 1 each PRN DAILY  PRN MC SEE 

COMMENTS;  Start 4/8/20 at 08:00;  Stop 4/9/20 at 08:16;  Status DC


Albumin Human 50 ml @ 50 mls/hr 1X  ONCE IV ;  Start 4/8/20 at 08:53;  Stop 

4/8/20 at 08:56;  Status DC


Albumin Human 200 ml @  50 mls/hr PRN 1X  PRN IV HYPOTENSION Last administered 

on 4/8/20at 09:09;  Start 4/8/20 at 09:00


Meropenem 500 mg/ Sodium Chloride 50 ml @  100 mls/hr Q12H IV  Last administered

on 4/10/20at 11:00;  Start 4/8/20 at 10:00


Sodium Chloride 90 meq/Magnesium Sulfate 12 meq/ Calcium Gluconate 15 meq/ 

Multivitamins 10 ml/Chromium/ Copper/Manganese/ Seleni/Zn 0.5 ml/ Insulin Human 

Regular 25 unit/ Total Parenteral Nutrition/Amino Acids/Dextrose/ Fat Emulsion 

Intravenous 1,400 ml @  58.333 mls/ hr TPN  CONT IV  Last administered on 

4/8/20at 21:41;  Start 4/8/20 at 22:00;  Stop 4/9/20 at 21:59;  Status DC


Sodium Chloride 1,000 ml @  1,000 mls/hr Q1H PRN IV hypotension;  Start 4/9/20 

at 07:58;  Stop 4/9/20 at 13:57;  Status DC


Albumin Human 200 ml @  200 mls/hr 1X PRN  PRN IV Hypotension Last administered 

on 4/9/20at 09:30;  Start 4/9/20 at 08:00;  Stop 4/9/20 at 13:59;  Status DC


Sodium Chloride 1,000 ml @  400 mls/hr Q2H30M PRN IV PATENCY;  Start 4/9/20 at 

07:58;  Stop 4/9/20 at 19:57;  Status DC


Info (PHARMACY MONITORING -- do not chart) 1 each PRN DAILY  PRN MC SEE 

COMMENTS;  Start 4/9/20 at 08:00


Info (PHARMACY MONITORING -- do not chart) 1 each PRN DAILY  PRN MC SEE 

COMMENTS;  Start 4/9/20 at 08:15;  Status UNV


Sodium Chloride 90 meq/Potassium Phosphate 5 mmol/ Magnesium Sulfate 12 

meq/Calcium Gluconate 15 meq/ Multivitamins 10 ml/Chromium/ Copper/Manganese/ 

Seleni/Zn 0.5 ml/ Insulin Human Regular 30 unit/ Total Parenteral 

Nutrition/Amino Acids/Dextrose/ Fat Emulsion Intravenous 1,400 ml @  58.333 mls/

hr TPN  CONT IV  Last administered on 4/9/20at 22:08;  Start 4/9/20 at 22:00;  

Stop 4/10/20 at 21:59


Linezolid/Dextrose 300 ml @  300 mls/hr Q12HR IV  Last administered on 4/10/20at

12:14;  Start 4/10/20 at 11:00


Sodium Chloride 90 meq/Potassium Phosphate 15 mmol/ Magnesium Sulfate 12 

meq/Calcium Gluconate 15 meq/ Multivitamins 10 ml/Chromium/ Copper/Manganese/ 

Seleni/Zn 0.5 ml/ Insulin Human Regular 30 unit/ Total Parenteral 

Nutrition/Amino Acids/Dextrose/ Fat Emulsion Intravenous 1,400 ml @  58.333 mls/

hr TPN  CONT IV ;  Start 4/10/20 at 22:00;  Stop 4/11/20 at 21:59





Active Scripts


Active


Reported


Bisoprolol Fumarate 5 Mg Tablet 10 Mg PO DAILY


Vitals/I & O





Vital Sign - Last 24 Hours








 4/9/20 4/9/20 4/9/20 4/9/20





 14:30 14:48 15:00 15:00


 


Temp 99.4   





 99.4   


 


Pulse 98   90


 


Resp 18   18


 


B/P (MAP) 112/56 (74)   86/56 (66)


 


Pulse Ox 100 100  99


 


O2 Delivery Ventilator Ventilator Mechanical Ventilator Ventilator


 


    





    





 4/9/20 4/9/20 4/9/20 4/9/20





 16:00 17:00 18:00 19:00


 


Pulse 96 106 134 102


 


Resp 17 17 20 20


 


B/P (MAP) 124/66 (85) 125/63 (83) 91/70 (77) 101/59 (73)


 


Pulse Ox 99 100 99 98


 


O2 Delivery Ventilator Ventilator Ventilator Ventilator





 4/9/20 4/9/20 4/9/20 4/9/20





 20:00 20:00 20:06 21:00


 


Temp  100.9  





  100.9  


 


Pulse  98  102


 


Resp  18  18


 


B/P (MAP)  122/65 (84)  118/58 (78)


 


Pulse Ox  98 98 97


 


O2 Delivery Mechanical Ventilator Ventilator Ventilator Ventilator


 


    





    





 4/9/20 4/9/20 4/9/20 4/10/20





 22:00 22:15 23:00 00:00


 


Temp 101.2   





 101.2   


 


Pulse 100 100 96 


 


Resp 18 18 18 


 


B/P (MAP) 130/58 (82) 114/53 (73) 100/52 (68) 


 


Pulse Ox 97 97 98 


 


O2 Delivery Ventilator Ventilator Ventilator Mechanical Ventilator


 


    





    





 4/10/20 4/10/20 4/10/20 4/10/20





 00:00 00:05 01:00 02:00


 


Temp 101.9   





 101.9   


 


Pulse 98  100 96


 


Resp 18  18 18


 


B/P (MAP) 107/55 (72)  116/57 (76) 111/59 (76)


 


Pulse Ox 98 98 97 98


 


O2 Delivery Ventilator Ventilator Ventilator Ventilator


 


    





    





 4/10/20 4/10/20 4/10/20 4/10/20





 03:00 04:00 04:00 04:05


 


Temp  100.9  





  100.9  


 


Pulse 96 94  


 


Resp 18 18  


 


B/P (MAP) 111/57 (75) 109/57 (74)  


 


Pulse Ox 98 97  98


 


O2 Delivery Ventilator Ventilator Mechanical Ventilator Ventilator


 


    





    





 4/10/20 4/10/20 4/10/20 4/10/20





 05:00 06:00 07:00 07:51


 


Pulse 88 92 90 


 


Resp 18 18 18 18


 


B/P (MAP) 101/58 (72) 120/58 (78) 121/61 (81) 


 


Pulse Ox 98 98 98 98


 


O2 Delivery Ventilator Ventilator Ventilator Ventilator





 4/10/20 4/10/20 4/10/20 4/10/20





 08:00 08:00 08:00 08:15


 


Temp 100.1   





 100.1   


 


Pulse 88   101


 


Resp 18   18


 


B/P (MAP) 118/57 (77)   71/36 (48)


 


Pulse Ox 98  98 98


 


O2 Delivery Ventilator Mechanical Ventilator Ventilator Ventilator


 


    





    





 4/10/20 4/10/20 4/10/20 4/10/20





 08:26 08:30 09:00 10:00


 


Pulse  95 89 92


 


Resp 18 18 18 18


 


B/P (MAP)  149/69 (95) 119/52 (74) 107/51 (69)


 


Pulse Ox 97 98 97 98


 


O2 Delivery Ventilator Ventilator Ventilator Ventilator





 4/10/20 4/10/20 4/10/20 4/10/20





 11:00 12:00 12:00 12:46


 


Temp  98.2  





  98.2  


 


Pulse 88 100  99


 


Resp 18 18  22


 


B/P (MAP) 110/58 (75) 106/59 (75)  106/52 (70)


 


Pulse Ox 99 99  100


 


O2 Delivery Ventilator Ventilator Mechanical Ventilator Ventilator


 


    





    





 4/10/20 4/10/20 4/10/20 





 13:00 13:30 14:00 


 


Pulse 97 98 100 


 


Resp 28 24 20 


 


B/P (MAP) 118/52 (74) 129/54 (79) 122/55 (77) 


 


Pulse Ox 98 99 99 


 


O2 Delivery Ventilator Ventilator Ventilator 














Intake and Output   


 


 4/9/20 4/9/20 4/10/20





 15:00 23:00 07:00


 


Intake Total  1284 ml 1070 ml


 


Output Total 110 ml 100 ml 180 ml


 


Balance -110 ml 1184 ml 890 ml











Hemodynamically unstable?:  No


Is patient in severe pain?:  No


Is NPO status required?:  Yes











CHEY TURNER MD              Apr 10, 2020 14:13

## 2020-04-10 NOTE — NUR
Pharmacy TPN Dosing Note



S: JESENIA RICH is a 49 year old F Currently receiving Central Continuous TPN started 
03/18/20



B:Pertinent PMH: 

Necrotizing pancreatitis

Height: 5 feet, 8 inches

Weight: 101.8 kg



Current diet: NPO 



LABS:

Sodium:    137 

Potassium: 3.8 

Chloride:  99 

Calcium:   8.4 

Corrected Calcium: 9.20 

Magnesium: 2.1 

CO2:       30 

SCr:       2.3 

Glucose:   209 

Albumin:   3.0 

AST:       47 

ALT:       20 



TPN FORMULA:

TPN TYPE:  Central Continuous

AMINO ACIDS:         125 gm

DEXTROSE:            195 gm

LIPIDS:              20 gm

SODIUM CHLORIDE:     90 mEq

SODIUM ACETATE:      -- mEq

SODIUM PHOSPHATE:    - mmol

POTASSIUM CHLORIDE:  - mEq

POTASSIUM ACETATE:   -- mEq

POTASSIUM PHOSPHATE: 15 mmol

MAGNESIUM:           12 mEq

CALCIUM:             15 mEq

INSULIN:             30 units

MULTIPLE VITAMIN:    10 ml

TRACE ELEMENTS:      0.5 ml(s)



TPN PLAN:  

Triglycerides >400 - lipid content reduced to 20g/bag

Phos trending down steadily, Kphos increased to 15mmol





R: Continue TPN as written above.

Will monitor electrolytes, glucose, and tolerance to TPN.



 PRESTON MCCULLOUGH [facility], 04/10/20 [id number]

## 2020-04-10 NOTE — PDOC
SUBJECTIVE


ROS


sedated and intubated





OBJECTIVE


Vital Signs





Vital Signs








  Date Time  Temp Pulse Resp B/P (MAP) Pulse Ox O2 Delivery O2 Flow Rate FiO2


 


4/10/20 10:00  92 18 107/51 (69) 98 Ventilator  


 


4/10/20 08:00 100.1       





 100.1       








I & 0











Intake and Output 


 


 4/10/20





 07:00


 


Intake Total 2354 ml


 


Output Total 390 ml


 


Balance 1964 ml


 


 


 


Intake Oral 0 ml


 


IV Total 2354 ml


 


Output Urine Total 340 ml


 


Gastric Drainage Total 50 ml











PHYSICAL EXAM


Physical Exam


GENERAL: On MV  


HEENT om moist  


NECK: Trach +  


LUNGS: Diminished aeration bases 


HEART:  S1, S2, regular 


ABDOMEN:  Distended, hypoactive BS, Rectal tube in place 


: [place]   


EXTREMITIES: Generalized edema,  some mottling


DERMATOLOGIC:   No generalized rash.  


CENTRAL NERVOUS SYSTEM: Sedated 


RIJ Temp HDC





DIAGNOSIS/ASSESSMENT


Assessment & Plan


ARF-- ATN, Off CRRT


Was getting Daily HD for UF  , Lost weight, stable Vol status  


 TTS  schedule now  ,No  Renal recovery yet   , Monitor 


 


Anemia- per primary  


Currently on YOLI 





Anasarca due to 3rd spacing ,  wt down  





Hyperkalemia- resolved  





AC Pancreatitis, early developing necrosis


No intervention per GS  


CT SCAN 4/9 -1. Increased ascites.Persistent evidence of necrotizing 

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





  Cholelithiasis with thickening of the gallbladder wall.





Acute hypoxic resp failure ,bilateral pleural effusion


On MV  


 Persistent pleural effusions and atelectasis in the lung bases.





hypocalcemia - stable 





HTN- Hypotensive , pressors as indicated





COVID-19 neg, 3/26





COMMENT/RELEVANT DATA


Meds





Current Medications








 Medications


  (Trade)  Dose


 Ordered  Sig/Yee  Start Time


 Stop Time Status Last Admin


Dose Admin


 


 Acetaminophen


  (Tylenol Supp)  650 mg  PRN Q6HRS  PRN  3/24/20 10:30


    4/9/20 22:30


650 MG


 


 Acetaminophen


  (Tylenol)  650 mg  PRN Q6HRS  PRN  3/21/20 03:36


    3/26/20 07:35


650 MG


 


 Albumin Human  200 ml @ 


 200 mls/hr  1X PRN  PRN  4/9/20 08:00


 4/9/20 13:59 DC 4/9/20 09:30


200 MLS/HR


 


 Albuterol Sulfate


  (Ventolin Neb


 Soln)  2.5 mg  1X  ONCE  3/17/20 22:30


 3/17/20 22:31 DC 3/18/20 00:56


2.5 MG


 


 Alteplase,


 Recombinant


  (Cathflo For


 Central Catheter


 Clearance)  1 mg  1X  ONCE  3/31/20 22:00


 3/31/20 22:01 DC 3/31/20 22:05


1 MG


 


 Artificial Tears


  (Artificial


 Tears)  1 drop  PRN Q1HR  PRN  3/23/20 08:15


     





 


 Atenolol


  (Tenormin)  100 mg  DAILY  3/17/20 09:00


 3/16/20 20:08 DC  





 


 Atropine Sulfate


  (ATROPINE 0.5mg


 SYRINGE)  0.5 mg  PRN Q5MIN  PRN  4/2/20 08:15


     





 


 Benzocaine


  (Hurricaine One)  1 spray  1X  ONCE  3/20/20 14:30


 3/20/20 14:31 DC 3/20/20 16:38


1 SPRAY


 


 Bupivacaine HCl/


 Epinephrine Bitart


  (Sensorcain-Epi


 0.5%-1:433436 Mpf)  30 ml  STK-MED ONCE  4/6/20 11:00


 4/6/20 11:01 DC 4/6/20 11:44


1 ML


 


 Calcium Carbonate/


 Glycine


  (Tums)  500 mg  PRN AFTMEALHC  PRN  3/18/20 17:45


     





 


 Calcium Chloride


 1000 mg/Sodium


 Chloride  110 ml @ 


 220 mls/hr  1X  ONCE  3/17/20 22:30


 3/17/20 22:59 DC 3/17/20 22:11


220 MLS/HR


 


 Calcium Chloride


 3000 mg/Sodium


 Chloride  1,030 ml @ 


 50 mls/hr  [id number]  3/19/20 08:00


 3/21/20 15:23 DC 3/21/20 02:17


50 MLS/HR


 


 Calcium Gluconate


  (Calcium


 Gluconate)  2,000 mg  1X  ONCE  3/19/20 02:15


 3/19/20 02:16 DC 3/19/20 02:19


2,000 MG


 


 Calcium Gluconate


 1000 mg/Sodium


 Chloride  110 ml @ 


 220 mls/hr  1X  ONCE  3/18/20 03:30


 3/18/20 03:59 DC 3/18/20 03:21


220 MLS/HR


 


 Calcium Gluconate


 2000 mg/Sodium


 Chloride  120 ml @ 


 220 mls/hr  1X  ONCE  3/18/20 07:30


 3/18/20 08:02 DC 3/18/20 09:05


220 MLS/HR


 


 Cefepime HCl


  (Maxipime)  2 gm  Q12HR  3/25/20 09:00


 4/8/20 09:58 DC 4/7/20 20:56


2 GM


 


 Cellulose


  (Surgicel


 Fibrillar 1x2)  1 each  STK-MED ONCE  4/6/20 11:00


 4/6/20 11:01 DC  





 


 Cellulose


  (Surgicel


 Hemostat 4x8)  1 each  STK-MED ONCE  4/6/20 11:55


 4/6/20 11:56 DC 4/6/20 11:44


1 EACH


 


 Daptomycin 500 mg/


 Sodium Chloride  50 ml @ 


 100 mls/hr  Q48H  3/25/20 08:30


 4/10/20 10:07 DC 4/10/20 09:57


100 MLS/HR


 


 Dexmedetomidine


 HCl 400 mcg/


 Sodium Chloride  100 ml @ 0


 mls/hr  CONT  PRN  4/2/20 08:15


    4/10/20 08:26


22.2 MLS/HR


 


 Dextrose


  (Dextrose


 50%-Water Syringe)  12.5 gm  PRN Q15MIN  PRN  3/16/20 09:30


     





 


 Digoxin


  (Lanoxin)  125 mcg  1X  ONCE  3/19/20 18:00


 3/19/20 18:01 DC 3/19/20 17:10


125 MCG


 


 Diphenhydramine


 HCl


  (Benadryl)  25 mg  1X PRN  PRN  4/8/20 08:00


 4/9/20 07:59 DC  





 


 Etomidate


  (Amidate)  8 mg  1X  ONCE  3/23/20 08:30


 3/23/20 08:31 DC 3/23/20 08:33


8 MG


 


 Fentanyl Citrate


  (Fentanyl 2ml


 Vial)  50 mcg  PRN Q5MIN  PRN  4/6/20 07:00


 4/7/20 06:59 DC  





 


 Furosemide


  (Lasix)  20 mg  1X  ONCE  4/2/20 08:15


 4/2/20 08:16 DC 4/2/20 08:19


20 MG


 


 Heparin Sodium


  (Porcine)


  (Hep Lock Adult)  500 unit  STK-MED ONCE  4/7/20 09:29


 4/7/20 09:30 DC  





 


 Heparin Sodium


  (Porcine)


  (Heparin Sodium)  5,000 unit  Q12HR  4/3/20 21:00


    4/10/20 09:00


5,000 UNIT


 


 Hydromorphone HCl


  (Dilaudid)  1 mg  PRN Q3HRS  PRN  3/17/20 12:00


 3/31/20 00:25 DC 3/23/20 05:13


1 MG


 


 Info


  (CONTRAST GIVEN


 -- Rx MONITORING)  1 each  PRN DAILY  PRN  3/30/20 11:45


 4/1/20 11:44 DC  





 


 Info


  (Icu Electrolyte


 Protocol)  1 ea  CONT PRN  PRN  3/29/20 13:15


     





 


 Info


  (PHARMACY


 MONITORING -- do


 not chart)  1 each  PRN DAILY  PRN  4/9/20 08:15


   UNV  





 


 Info


  (Tpn Per


 Pharmacy)  1 each  PRN DAILY  PRN  3/18/20 12:30


   UNV  





 


 Insulin Human


 Lispro


  (HumaLOG)  0-9 UNITS  Q6HRS  3/16/20 09:30


    4/10/20 07:52


5 UNITS


 


 Insulin Human


 Regular


  (HumuLIN R VIAL)  5 unit  1X  ONCE  3/17/20 22:30


 3/17/20 22:31 DC 3/17/20 22:14


5 UNIT


 


 Iohexol


  (Omnipaque 240


 Mg/ml)  30 ml  1X  ONCE  3/30/20 11:30


 3/30/20 11:33 DC 3/30/20 11:30


30 ML


 


 Iohexol


  (Omnipaque 300


 Mg/ml)  60 ml  1X  ONCE  3/17/20 17:00


 3/17/20 17:01 DC 3/17/20 17:20


60 ML


 


 Iohexol


  (Omnipaque 350


 Mg/ml)  90 ml  1X  ONCE  3/16/20 03:30


 3/16/20 03:31 DC 3/16/20 03:25


90 ML


 


 Ketorolac


 Tromethamine


  (Toradol 30mg


 Vial)  30 mg  1X  ONCE  3/16/20 03:00


 3/16/20 03:01 DC 3/16/20 02:54


30 MG


 


 Lidocaine HCl


  (Buffered


 Lidocaine 1%)  6 ml  1X  ONCE  4/2/20 09:00


 4/2/20 09:06 DC 4/2/20 09:05


6 ML


 


 Lidocaine HCl


  (Glydo


  (Lidocaine) Jelly)  1 thomas  1X  ONCE  3/20/20 14:30


 3/20/20 14:31 DC 3/20/20 16:38


1 THOMAS


 


 Lidocaine HCl


  (Xylocaine-Mpf


 1% 2ml Vial)  2 ml  PRN 1X  PRN  4/6/20 07:00


 4/7/20 06:59 DC  





 


 Linezolid/Dextrose  300 ml @ 


 300 mls/hr  Q12HR  4/10/20 11:00


     





 


 Lorazepam


  (Ativan Inj)  1 mg  PRN Q4HRS  PRN  3/19/20 09:00


    3/23/20 00:34


1 MG


 


 Magnesium Sulfate  50 ml @ 25


 mls/hr  PRN DAILY  PRN  4/5/20 09:15


     





 


 Meropenem 1 gm/


 Sodium Chloride  100 ml @ 


 200 mls/hr  Q8HRS  3/17/20 20:00


 3/18/20 08:48 DC 3/18/20 05:45


200 MLS/HR


 


 Meropenem 500 mg/


 Sodium Chloride  50 ml @ 


 100 mls/hr  Q12H  4/8/20 10:00


    4/9/20 22:12


100 MLS/HR


 


 Metoprolol


 Tartrate


  (Lopressor Vial)  5 mg  Q6HRS  3/17/20 10:15


 3/28/20 08:48 DC 3/26/20 00:12


5 MG


 


 Metronidazole  100 ml @ 


 100 mls/hr  Q6HRS  3/23/20 08:30


 4/8/20 09:58 DC 4/8/20 06:26


100 MLS/HR


 


 Micafungin Sodium


 100 mg/Dextrose  100 ml @ 


 100 mls/hr  Q24H  3/23/20 09:00


    4/10/20 08:05


100 MLS/HR


 


 Midazolam HCl


  (Versed)  5 mg  1X  ONCE  3/23/20 08:30


 3/23/20 08:31 DC  





 


 Midazolam HCl 100


 mg/Sodium Chloride  100 ml @ 7


 mls/hr  CONT  PRN  3/28/20 16:00


    4/8/20 15:35


7 MLS/HR


 


 Midazolam HCl 50


 mg/Sodium Chloride  50 ml @ 0


 mls/hr  CONT  PRN  3/23/20 08:15


 3/28/20 15:59 DC 3/26/20 22:39


7 MLS/HR


 


 Morphine Sulfate


  (Morphine


 Sulfate)  2 mg  PRN Q2HR  PRN  3/16/20 05:00


 3/17/20 14:15 DC 3/17/20 12:26


2 MG


 


 Multi-Ingred


 Cream/Lotion/Oil/


 Oint


  (Artificial


 Tears Eye


 Ointment)  1 thomas  PRN Q1HR  PRN  3/25/20 17:30


    4/3/20 11:05


1 THOMAS


 


 Norepinephrine


 Bitartrate 8 mg/


 Dextrose  258 ml @ 


 17.299 mls/


 hr  CONT  PRN  3/17/20 15:30


    4/9/20 12:39


17.299 MLS/HR


 


 Ondansetron HCl


  (Zofran)  4 mg  PRN Q6HRS  PRN  4/6/20 07:00


 4/7/20 06:59 DC  





 


 Pantoprazole


 Sodium


  (PROTONIX VIAL


 for IV PUSH)  40 mg  DAILYAC  3/16/20 11:30


    4/10/20 08:05


40 MG


 


 Piperacillin Sod/


 Tazobactam Sod


 4.5 gm/Sodium


 Chloride  100 ml @ 


 200 mls/hr  1X  ONCE  3/16/20 06:00


 3/16/20 06:29 DC 3/16/20 05:44


200 MLS/HR


 


 Potassium


 Chloride 15 meq/


 Bicarbonate


 Dialysis Soln w/


 out KCl  5,007.5 ml


  @ 1,000 mls/


 hr  Q5H1M  3/29/20 20:00


 4/2/20 13:08 DC 4/1/20 18:14


1,000 MLS/HR


 


 Potassium


 Chloride 20 meq/


 Bicarbonate


 Dialysis Soln w/


 out KCl  5,010 ml @ 


 1,000 mls/hr  Q5H1M  3/25/20 16:00


 3/29/20 19:59 DC 3/29/20 14:54


1,000 MLS/HR


 


 Potassium


 Chloride/Water  100 ml @ 


 100 mls/hr  Q1H  3/24/20 11:00


 3/24/20 12:59 DC 3/24/20 12:12


100 MLS/HR


 


 Potassium


 Phosphate 20 mmol/


 Sodium Chloride  106.6667


 ml @ 


 51.667 m...  1X  ONCE  3/25/20 13:00


 3/25/20 15:03 DC 3/25/20 12:51


51.667 MLS/HR


 


 Prochlorperazine


 Edisylate


  (Compazine)  5 mg  PACU PRN  PRN  4/6/20 07:00


 4/7/20 06:59 DC  





 


 Propofol  20 ml @ As


 Directed  STK-MED ONCE  4/6/20 11:07


 4/6/20 11:07 DC  





 


 Ringer's Solution  1,000 ml @ 


 30 mls/hr  Q24H  4/6/20 07:00


 4/6/20 18:59 DC  





 


 Sevoflurane


  (Ultane)  60 ml  STK-MED ONCE  4/6/20 12:46


 4/6/20 12:46 DC  





 


 Sodium


 Bicarbonate 50


 meq/Sodium


 Chloride  1,050 ml @ 


 75 mls/hr  Q14H  3/18/20 07:30


 3/23/20 10:28 DC 3/22/20 21:10


75 MLS/HR


 


 Sodium Chloride


  (Normal Saline


 Flush)  10 ml  1X PRN  PRN  4/3/20 07:30


 4/4/20 07:29 DC  





 


 Sodium Chloride


 90 meq/Calcium


 Gluconate 10 meq/


 Multivitamins 10


 ml/Chromium/


 Copper/Manganese/


 Seleni/Zn 0.5 ml/


 Total Parenteral


 Nutrition/Amino


 Acids/Dextrose/


 Fat Emulsion


 Intravenous  1,512 ml @ 


 63 mls/hr  TPN  CONT  3/18/20 22:00


 3/19/20 21:59 DC 3/18/20 22:06


63 MLS/HR


 


 Sodium Chloride


 90 meq/Calcium


 Gluconate 10 meq/


 Multivitamins 10


 ml/Chromium/


 Copper/Manganese/


 Seleni/Zn 1 ml/


 Total Parenteral


 Nutrition/Amino


 Acids/Dextrose/


 Fat Emulsion


 Intravenous  55.005 ml


  @ 2.292


 mls/hr  TPN  CONT  3/18/20 22:00


 3/18/20 12:33 DC  





 


 Sodium Chloride


 90 meq/Magnesium


 Sulfate 10 meq/


 Calcium Gluconate


 20 meq/


 Multivitamins 10


 ml/Chromium/


 Copper/Manganese/


 Seleni/Zn 0.5 ml/


 Total Parenteral


 Nutrition/Amino


 Acids/Dextrose/


 Fat Emulsion


 Intravenous  1,512 ml @ 


 63 mls/hr  TPN  CONT  3/19/20 22:00


 3/20/20 21:59 DC 3/19/20 22:25


63 MLS/HR


 


 Sodium Chloride


 90 meq/Magnesium


 Sulfate 12 meq/


 Calcium Gluconate


 15 meq/


 Multivitamins 10


 ml/Chromium/


 Copper/Manganese/


 Seleni/Zn 0.5 ml/


 Insulin Human


 Regular 25 unit/


 Total Parenteral


 Nutrition/Amino


 Acids/Dextrose/


 Fat Emulsion


 Intravenous  1,400 ml @ 


 58.333 mls/


 hr  TPN  CONT  4/8/20 22:00


 4/9/20 21:59 DC 4/8/20 21:41


58.333 MLS/HR


 


 Sodium Chloride


 90 meq/Potassium


 Chloride 15 meq/


 Magnesium Sulfate


 12 meq/Calcium


 Gluconate 15 meq/


 Multivitamins 10


 ml/Chromium/


 Copper/Manganese/


 Seleni/Zn 0.5 ml/


 Insulin Human


 Regular 25 unit/


 Total Parenteral


 Nutrition/Amino


 Acids/Dextrose/


 Fat Emulsion


 Intravenous  1,400 ml @ 


 58.333 mls/


 hr  TPN  CONT  4/7/20 22:00


 4/8/20 21:59 DC 4/7/20 22:13


58.333 MLS/HR


 


 Sodium Chloride


 90 meq/Potassium


 Chloride 15 meq/


 Potassium


 Phosphate 10 mmol/


 Magnesium Sulfate


 8 meq/Calcium


 Gluconate 15 meq/


 Multivitamins 10


 ml/Chromium/


 Copper/Manganese/


 Seleni/Zn 0.5 ml/


 Insulin Human


 Regular 25 unit/


 Total Parenteral


 Nutrition/Amino


 Acids/Dextrose/


 Fat Emulsion


 Intravenous  1,400 ml @ 


 58.333 mls/


 hr  TPN  CONT  4/5/20 22:00


 4/6/20 21:59 DC 4/5/20 21:20


58.333 MLS/HR


 


 Sodium Chloride


 90 meq/Potassium


 Chloride 15 meq/


 Potassium


 Phosphate 10 mmol/


 Magnesium Sulfate


 10 meq/Calcium


 Gluconate 20 meq/


 Multivitamins 10


 ml/Chromium/


 Copper/Manganese/


 Seleni/Zn 0.5 ml/


 Total Parenteral


 Nutrition/Amino


 Acids/Dextrose/


 Fat Emulsion


 Intravenous  1,400 ml @ 


 58.333 mls/


 hr  TPN  CONT  3/23/20 22:00


 3/24/20 21:59 DC 3/23/20 21:42


58.333 MLS/HR


 


 Sodium Chloride


 90 meq/Potassium


 Chloride 15 meq/


 Potassium


 Phosphate 10 mmol/


 Magnesium Sulfate


 12 meq/Calcium


 Gluconate 15 meq/


 Multivitamins 10


 ml/Chromium/


 Copper/Manganese/


 Seleni/Zn 0.5 ml/


 Insulin Human


 Regular 25 unit/


 Total Parenteral


 Nutrition/Amino


 Acids/Dextrose/


 Fat Emulsion


 Intravenous  1,400 ml @ 


 58.333 mls/


 hr  TPN  CONT  4/6/20 22:00


 4/7/20 21:59 DC 4/6/20 22:24


58.333 MLS/HR


 


 Sodium Chloride


 90 meq/Potassium


 Chloride 15 meq/


 Potassium


 Phosphate 15 mmol/


 Magnesium Sulfate


 10 meq/Calcium


 Gluconate 15 meq/


 Multivitamins 10


 ml/Chromium/


 Copper/Manganese/


 Seleni/Zn 0.5 ml/


 Total Parenteral


 Nutrition/Amino


 Acids/Dextrose/


 Fat Emulsion


 Intravenous  1,400 ml @ 


 58.333 mls/


 hr  TPN  CONT  3/24/20 22:00


 3/25/20 21:59 DC 3/24/20 22:17


58.333 MLS/HR


 


 Sodium Chloride


 90 meq/Potassium


 Chloride 15 meq/


 Potassium


 Phosphate 15 mmol/


 Magnesium Sulfate


 10 meq/Calcium


 Gluconate 20 meq/


 Multivitamins 10


 ml/Chromium/


 Copper/Manganese/


 Seleni/Zn 0.5 ml/


 Total Parenteral


 Nutrition/Amino


 Acids/Dextrose/


 Fat Emulsion


 Intravenous  1,200 ml @ 


 50 mls/hr  TPN  CONT  3/22/20 22:00


 3/22/20 14:17 DC  





 


 Sodium Chloride


 90 meq/Potassium


 Chloride 15 meq/


 Potassium


 Phosphate 18 mmol/


 Magnesium Sulfate


 8 meq/Calcium


 Gluconate 15 meq/


 Multivitamins 10


 ml/Chromium/


 Copper/Manganese/


 Seleni/Zn 0.5 ml/


 Insulin Human


 Regular 10 unit/


 Total Parenteral


 Nutrition/Amino


 Acids/Dextrose/


 Fat Emulsion


 Intravenous  1,400 ml @ 


 58.333 mls/


 hr  TPN  CONT  3/27/20 22:00


 3/28/20 21:59 DC 3/27/20 21:43


58.333 MLS/HR


 


 Sodium Chloride


 90 meq/Potassium


 Chloride 15 meq/


 Potassium


 Phosphate 18 mmol/


 Magnesium Sulfate


 8 meq/Calcium


 Gluconate 15 meq/


 Multivitamins 10


 ml/Chromium/


 Copper/Manganese/


 Seleni/Zn 0.5 ml/


 Insulin Human


 Regular 15 unit/


 Total Parenteral


 Nutrition/Amino


 Acids/Dextrose/


 Fat Emulsion


 Intravenous  1,400 ml @ 


 58.333 mls/


 hr  TPN  CONT  3/30/20 22:00


 3/31/20 21:59 DC 3/30/20 21:47


58.333 MLS/HR


 


 Sodium Chloride


 90 meq/Potassium


 Chloride 15 meq/


 Potassium


 Phosphate 18 mmol/


 Magnesium Sulfate


 8 meq/Calcium


 Gluconate 15 meq/


 Multivitamins 10


 ml/Chromium/


 Copper/Manganese/


 Seleni/Zn 0.5 ml/


 Insulin Human


 Regular 20 unit/


 Total Parenteral


 Nutrition/Amino


 Acids/Dextrose/


 Fat Emulsion


 Intravenous  1,400 ml @ 


 58.333 mls/


 hr  TPN  CONT  4/2/20 22:00


 4/3/20 21:59 DC 4/2/20 22:45


58.333 MLS/HR


 


 Sodium Chloride


 90 meq/Potassium


 Chloride 15 meq/


 Potassium


 Phosphate 18 mmol/


 Magnesium Sulfate


 8 meq/Calcium


 Gluconate 15 meq/


 Multivitamins 10


 ml/Chromium/


 Copper/Manganese/


 Seleni/Zn 0.5 ml/


 Total Parenteral


 Nutrition/Amino


 Acids/Dextrose/


 Fat Emulsion


 Intravenous  1,400 ml @ 


 58.333 mls/


 hr  TPN  CONT  3/26/20 22:00


 3/27/20 21:59 DC 3/26/20 22:00


58.333 MLS/HR


 


 Sodium Chloride


 90 meq/Potassium


 Phosphate 5 mmol/


 Magnesium Sulfate


 12 meq/Calcium


 Gluconate 15 meq/


 Multivitamins 10


 ml/Chromium/


 Copper/Manganese/


 Seleni/Zn 0.5 ml/


 Insulin Human


 Regular 30 unit/


 Total Parenteral


 Nutrition/Amino


 Acids/Dextrose/


 Fat Emulsion


 Intravenous  1,400 ml @ 


 58.333 mls/


 hr  TPN  CONT  4/9/20 22:00


 4/10/20 21:59  4/9/20 22:08


58.333 MLS/HR


 


 Succinylcholine


 Chloride


  (Anectine)  120 mg  1X  ONCE  3/23/20 08:30


 3/23/20 08:31 DC 3/23/20 08:34


120 MG








Lab





Laboratory Tests








Test


 4/9/20


14:50 4/9/20


18:30 4/10/20


00:21 4/10/20


06:00


 


Glucose (Fingerstick)


 157 mg/dL


(70-99) 189 mg/dL


(70-99) 218 mg/dL


(70-99) 





 


White Blood Count


 


 


 


 13.1 x10^3/uL


(4.0-11.0)


 


Red Blood Count


 


 


 


 2.32 x10^6/uL


(3.50-5.40)


 


Hemoglobin


 


 


 


 7.4 g/dL


(12.0-15.5)


 


Hematocrit


 


 


 


 23.1 %


(36.0-47.0)


 


Mean Corpuscular Volume


 


 


 


 100 fL


()


 


Mean Corpuscular Hemoglobin    32 pg (25-35) 


 


Mean Corpuscular Hemoglobin


Concent 


 


 


 32 g/dL


(31-37)


 


Red Cell Distribution Width


 


 


 


 19.5 %


(11.5-14.5)


 


Platelet Count


 


 


 


 341 x10^3/uL


(140-400)


 


Neutrophils (%) (Auto)    60 % (31-73) 


 


Lymphocytes (%) (Auto)    17 % (24-48) 


 


Monocytes (%) (Auto)    13 % (0-9) 


 


Eosinophils (%) (Auto)    9 % (0-3) 


 


Basophils (%) (Auto)    1 % (0-3) 


 


Neutrophils # (Auto)


 


 


 


 7.9 x10^3/uL


(1.8-7.7)


 


Lymphocytes # (Auto)


 


 


 


 2.3 x10^3/uL


(1.0-4.8)


 


Monocytes # (Auto)


 


 


 


 1.7 x10^3/uL


(0.0-1.1)


 


Eosinophils # (Auto)


 


 


 


 1.1 x10^3/uL


(0.0-0.7)


 


Basophils # (Auto)


 


 


 


 0.1 x10^3/uL


(0.0-0.2)


 


Sodium Level


 


 


 


 137 mmol/L


(136-145)


 


Potassium Level


 


 


 


 3.8 mmol/L


(3.5-5.1)


 


Chloride Level


 


 


 


 99 mmol/L


()


 


Carbon Dioxide Level


 


 


 


 30 mmol/L


(21-32)


 


Anion Gap    8 (6-14) 


 


Blood Urea Nitrogen


 


 


 


 49 mg/dL


(7-20)


 


Creatinine


 


 


 


 2.3 mg/dL


(0.6-1.0)


 


Estimated GFR


(Cockcroft-Gault) 


 


 


 22.5 





 


Glucose Level


 


 


 


 209 mg/dL


(70-99)


 


Calcium Level


 


 


 


 8.4 mg/dL


(8.5-10.1)


 


Phosphorus Level


 


 


 


 2.6 mg/dL


(2.6-4.7)


 


Albumin


 


 


 


 3.0 g/dL


(3.4-5.0)


 


Test


 4/10/20


09:00 


 


 





 


O2 Saturation 95 % (92-99)    


 


Arterial Blood pH


 7.43


(7.35-7.45) 


 


 





 


Arterial Blood pCO2 at


Patient Temp 44 mmHg


(35-46) 


 


 





 


Arterial Blood pO2 at Patient


Temp 81 mmHg


() 


 


 





 


Arterial Blood HCO3


 28 mmol/L


(21-28) 


 


 





 


Arterial Blood Base Excess


 4 mmol/L


(-3-3) 


 


 





 


FiO2 35% vent    








Results


All relevant outside records, renal labs, imaging studies, telemetry/EKG's were 

reviewed.











KIMBERLY MYERS MD                Apr 10, 2020 10:46

## 2020-04-10 NOTE — PDOC
SURGICAL PROGRESS NOTE


Subjective


Pt intubated, less pulm support


Vital Signs





Vital Signs








  Date Time  Temp Pulse Resp B/P (MAP) Pulse Ox O2 Delivery O2 Flow Rate FiO2


 


4/10/20 12:46  99 22 106/52 (70) 100 Ventilator  


 


4/10/20 12:00 98.2       





 98.2       








I&O











Intake and Output 


 


 4/10/20





 07:00


 


Intake Total 2354 ml


 


Output Total 390 ml


 


Balance 1964 ml


 


 


 


Intake Oral 0 ml


 


IV Total 2354 ml


 


Output Urine Total 340 ml


 


Gastric Drainage Total 50 ml








General:  No acute distress


Abdomen:  Soft, No tenderness


Labs





Laboratory Tests








Test


 4/8/20


17:45 4/9/20


00:03 4/9/20


06:15 4/9/20


08:00


 


Glucose (Fingerstick)


 197 mg/dL


(70-99) 206 mg/dL


(70-99) 203 mg/dL


(70-99) 





 


White Blood Count


 


 


 12.0 x10^3/uL


(4.0-11.0) 





 


Red Blood Count


 


 


 2.30 x10^6/uL


(3.50-5.40) 





 


Hemoglobin


 


 


 7.4 g/dL


(12.0-15.5) 





 


Hematocrit


 


 


 22.9 %


(36.0-47.0) 





 


Mean Corpuscular Volume


 


 


 100 fL


() 





 


Mean Corpuscular Hemoglobin   32 pg (25-35)  


 


Mean Corpuscular Hemoglobin


Concent 


 


 32 g/dL


(31-37) 





 


Red Cell Distribution Width


 


 


 20.1 %


(11.5-14.5) 





 


Platelet Count


 


 


 328 x10^3/uL


(140-400) 





 


Sodium Level


 


 


 137 mmol/L


(136-145) 





 


Potassium Level


 


 


 3.7 mmol/L


(3.5-5.1) 





 


Chloride Level


 


 


 99 mmol/L


() 





 


Carbon Dioxide Level


 


 


 29 mmol/L


(21-32) 





 


Anion Gap   9 (6-14)  


 


Blood Urea Nitrogen


 


 


 51 mg/dL


(7-20) 





 


Creatinine


 


 


 2.2 mg/dL


(0.6-1.0) 





 


Estimated GFR


(Cockcroft-Gault) 


 


 23.7 


 





 


Glucose Level


 


 


 211 mg/dL


(70-99) 





 


Calcium Level


 


 


 8.7 mg/dL


(8.5-10.1) 





 


Phosphorus Level


 


 


 3.6 mg/dL


(2.6-4.7) 





 


Magnesium Level


 


 


 2.1 mg/dL


(1.8-2.4) 





 


Albumin


 


 


 2.6 g/dL


(3.4-5.0) 





 


Triglycerides Level


 


 


 436 mg/dL


(0-150) 





 


O2 Saturation    92 % (92-99) 


 


Arterial Blood pH


 


 


 


 7.40


(7.35-7.45)


 


Arterial Blood pCO2 at


Patient Temp 


 


 


 45 mmHg


(35-46)


 


Arterial Blood pO2 at Patient


Temp 


 


 


 69 mmHg


()


 


Arterial Blood HCO3


 


 


 


 27 mmol/L


(21-28)


 


Arterial Blood Base Excess


 


 


 


 2 mmol/L


(-3-3)


 


FiO2    35 


 


Test


 4/9/20


14:50 4/9/20


18:30 4/10/20


00:21 4/10/20


06:00


 


Glucose (Fingerstick)


 157 mg/dL


(70-99) 189 mg/dL


(70-99) 218 mg/dL


(70-99) 





 


White Blood Count


 


 


 


 13.1 x10^3/uL


(4.0-11.0)


 


Red Blood Count


 


 


 


 2.32 x10^6/uL


(3.50-5.40)


 


Hemoglobin


 


 


 


 7.4 g/dL


(12.0-15.5)


 


Hematocrit


 


 


 


 23.1 %


(36.0-47.0)


 


Mean Corpuscular Volume


 


 


 


 100 fL


()


 


Mean Corpuscular Hemoglobin    32 pg (25-35) 


 


Mean Corpuscular Hemoglobin


Concent 


 


 


 32 g/dL


(31-37)


 


Red Cell Distribution Width


 


 


 


 19.5 %


(11.5-14.5)


 


Platelet Count


 


 


 


 341 x10^3/uL


(140-400)


 


Neutrophils (%) (Auto)    60 % (31-73) 


 


Lymphocytes (%) (Auto)    17 % (24-48) 


 


Monocytes (%) (Auto)    13 % (0-9) 


 


Eosinophils (%) (Auto)    9 % (0-3) 


 


Basophils (%) (Auto)    1 % (0-3) 


 


Neutrophils # (Auto)


 


 


 


 7.9 x10^3/uL


(1.8-7.7)


 


Lymphocytes # (Auto)


 


 


 


 2.3 x10^3/uL


(1.0-4.8)


 


Monocytes # (Auto)


 


 


 


 1.7 x10^3/uL


(0.0-1.1)


 


Eosinophils # (Auto)


 


 


 


 1.1 x10^3/uL


(0.0-0.7)


 


Basophils # (Auto)


 


 


 


 0.1 x10^3/uL


(0.0-0.2)


 


Sodium Level


 


 


 


 137 mmol/L


(136-145)


 


Potassium Level


 


 


 


 3.8 mmol/L


(3.5-5.1)


 


Chloride Level


 


 


 


 99 mmol/L


()


 


Carbon Dioxide Level


 


 


 


 30 mmol/L


(21-32)


 


Anion Gap    8 (6-14) 


 


Blood Urea Nitrogen


 


 


 


 49 mg/dL


(7-20)


 


Creatinine


 


 


 


 2.3 mg/dL


(0.6-1.0)


 


Estimated GFR


(Cockcroft-Gault) 


 


 


 22.5 





 


Glucose Level


 


 


 


 209 mg/dL


(70-99)


 


Calcium Level


 


 


 


 8.4 mg/dL


(8.5-10.1)


 


Phosphorus Level


 


 


 


 2.6 mg/dL


(2.6-4.7)


 


Albumin


 


 


 


 3.0 g/dL


(3.4-5.0)


 


Test


 4/10/20


09:00 4/10/20


12:29 


 





 


O2 Saturation 95 % (92-99)    


 


Arterial Blood pH


 7.43


(7.35-7.45) 


 


 





 


Arterial Blood pCO2 at


Patient Temp 44 mmHg


(35-46) 


 


 





 


Arterial Blood pO2 at Patient


Temp 81 mmHg


() 


 


 





 


Arterial Blood HCO3


 28 mmol/L


(21-28) 


 


 





 


Arterial Blood Base Excess


 4 mmol/L


(-3-3) 


 


 





 


FiO2 35% vent    


 


Glucose (Fingerstick)


 


 247 mg/dL


(70-99) 


 











Laboratory Tests








Test


 4/9/20


14:50 4/9/20


18:30 4/10/20


00:21 4/10/20


06:00


 


Glucose (Fingerstick)


 157 mg/dL


(70-99) 189 mg/dL


(70-99) 218 mg/dL


(70-99) 





 


White Blood Count


 


 


 


 13.1 x10^3/uL


(4.0-11.0)


 


Red Blood Count


 


 


 


 2.32 x10^6/uL


(3.50-5.40)


 


Hemoglobin


 


 


 


 7.4 g/dL


(12.0-15.5)


 


Hematocrit


 


 


 


 23.1 %


(36.0-47.0)


 


Mean Corpuscular Volume


 


 


 


 100 fL


()


 


Mean Corpuscular Hemoglobin    32 pg (25-35) 


 


Mean Corpuscular Hemoglobin


Concent 


 


 


 32 g/dL


(31-37)


 


Red Cell Distribution Width


 


 


 


 19.5 %


(11.5-14.5)


 


Platelet Count


 


 


 


 341 x10^3/uL


(140-400)


 


Neutrophils (%) (Auto)    60 % (31-73) 


 


Lymphocytes (%) (Auto)    17 % (24-48) 


 


Monocytes (%) (Auto)    13 % (0-9) 


 


Eosinophils (%) (Auto)    9 % (0-3) 


 


Basophils (%) (Auto)    1 % (0-3) 


 


Neutrophils # (Auto)


 


 


 


 7.9 x10^3/uL


(1.8-7.7)


 


Lymphocytes # (Auto)


 


 


 


 2.3 x10^3/uL


(1.0-4.8)


 


Monocytes # (Auto)


 


 


 


 1.7 x10^3/uL


(0.0-1.1)


 


Eosinophils # (Auto)


 


 


 


 1.1 x10^3/uL


(0.0-0.7)


 


Basophils # (Auto)


 


 


 


 0.1 x10^3/uL


(0.0-0.2)


 


Sodium Level


 


 


 


 137 mmol/L


(136-145)


 


Potassium Level


 


 


 


 3.8 mmol/L


(3.5-5.1)


 


Chloride Level


 


 


 


 99 mmol/L


()


 


Carbon Dioxide Level


 


 


 


 30 mmol/L


(21-32)


 


Anion Gap    8 (6-14) 


 


Blood Urea Nitrogen


 


 


 


 49 mg/dL


(7-20)


 


Creatinine


 


 


 


 2.3 mg/dL


(0.6-1.0)


 


Estimated GFR


(Cockcroft-Gault) 


 


 


 22.5 





 


Glucose Level


 


 


 


 209 mg/dL


(70-99)


 


Calcium Level


 


 


 


 8.4 mg/dL


(8.5-10.1)


 


Phosphorus Level


 


 


 


 2.6 mg/dL


(2.6-4.7)


 


Albumin


 


 


 


 3.0 g/dL


(3.4-5.0)


 


Test


 4/10/20


09:00 4/10/20


12:29 


 





 


O2 Saturation 95 % (92-99)    


 


Arterial Blood pH


 7.43


(7.35-7.45) 


 


 





 


Arterial Blood pCO2 at


Patient Temp 44 mmHg


(35-46) 


 


 





 


Arterial Blood pO2 at Patient


Temp 81 mmHg


() 


 


 





 


Arterial Blood HCO3


 28 mmol/L


(21-28) 


 


 





 


Arterial Blood Base Excess


 4 mmol/L


(-3-3) 


 


 





 


FiO2 35% vent    


 


Glucose (Fingerstick)


 


 247 mg/dL


(70-99) 


 











Problem List


Problems


Medical Problems:


(1) Acute pancreatitis


Status: Acute  





(2) Cholelithiasis


Status: Acute  








Assessment/Plan


severe pancreatitis


cont supportive care











GAMAL ZHOU MD             Apr 10, 2020 13:46

## 2020-04-11 VITALS — SYSTOLIC BLOOD PRESSURE: 100 MMHG | DIASTOLIC BLOOD PRESSURE: 49 MMHG

## 2020-04-11 VITALS — SYSTOLIC BLOOD PRESSURE: 109 MMHG | DIASTOLIC BLOOD PRESSURE: 52 MMHG

## 2020-04-11 VITALS — DIASTOLIC BLOOD PRESSURE: 52 MMHG | SYSTOLIC BLOOD PRESSURE: 99 MMHG

## 2020-04-11 VITALS — SYSTOLIC BLOOD PRESSURE: 157 MMHG | DIASTOLIC BLOOD PRESSURE: 73 MMHG

## 2020-04-11 VITALS — DIASTOLIC BLOOD PRESSURE: 50 MMHG | SYSTOLIC BLOOD PRESSURE: 97 MMHG

## 2020-04-11 VITALS — DIASTOLIC BLOOD PRESSURE: 57 MMHG | SYSTOLIC BLOOD PRESSURE: 115 MMHG

## 2020-04-11 VITALS — DIASTOLIC BLOOD PRESSURE: 57 MMHG | SYSTOLIC BLOOD PRESSURE: 105 MMHG

## 2020-04-11 VITALS — SYSTOLIC BLOOD PRESSURE: 130 MMHG | DIASTOLIC BLOOD PRESSURE: 64 MMHG

## 2020-04-11 VITALS — SYSTOLIC BLOOD PRESSURE: 135 MMHG | DIASTOLIC BLOOD PRESSURE: 63 MMHG

## 2020-04-11 VITALS — SYSTOLIC BLOOD PRESSURE: 102 MMHG | DIASTOLIC BLOOD PRESSURE: 53 MMHG

## 2020-04-11 VITALS — DIASTOLIC BLOOD PRESSURE: 98 MMHG | SYSTOLIC BLOOD PRESSURE: 114 MMHG

## 2020-04-11 VITALS — SYSTOLIC BLOOD PRESSURE: 127 MMHG | DIASTOLIC BLOOD PRESSURE: 74 MMHG

## 2020-04-11 VITALS — DIASTOLIC BLOOD PRESSURE: 46 MMHG | SYSTOLIC BLOOD PRESSURE: 117 MMHG

## 2020-04-11 VITALS — DIASTOLIC BLOOD PRESSURE: 60 MMHG | SYSTOLIC BLOOD PRESSURE: 113 MMHG

## 2020-04-11 VITALS — SYSTOLIC BLOOD PRESSURE: 109 MMHG | DIASTOLIC BLOOD PRESSURE: 54 MMHG

## 2020-04-11 VITALS — DIASTOLIC BLOOD PRESSURE: 63 MMHG | SYSTOLIC BLOOD PRESSURE: 117 MMHG

## 2020-04-11 VITALS — SYSTOLIC BLOOD PRESSURE: 114 MMHG | DIASTOLIC BLOOD PRESSURE: 55 MMHG

## 2020-04-11 VITALS — SYSTOLIC BLOOD PRESSURE: 111 MMHG | DIASTOLIC BLOOD PRESSURE: 59 MMHG

## 2020-04-11 VITALS — SYSTOLIC BLOOD PRESSURE: 106 MMHG | DIASTOLIC BLOOD PRESSURE: 54 MMHG

## 2020-04-11 LAB
ALBUMIN SERPL-MCNC: 2.6 G/DL (ref 3.4–5)
ANION GAP SERPL CALC-SCNC: 12 MMOL/L (ref 6–14)
BASE EXCESS ABG: -4 MMOL/L (ref -3–3)
BUN SERPL-MCNC: 72 MG/DL (ref 7–20)
CALCIUM SERPL-MCNC: 8.9 MG/DL (ref 8.5–10.1)
CHLORIDE SERPL-SCNC: 98 MMOL/L (ref 98–107)
CO2 SERPL-SCNC: 25 MMOL/L (ref 21–32)
CREAT SERPL-MCNC: 2.5 MG/DL (ref 0.6–1)
GFR SERPLBLD BASED ON 1.73 SQ M-ARVRAT: 20.5 ML/MIN
GLUCOSE SERPL-MCNC: 268 MG/DL (ref 70–99)
HCO3 BLDA-SCNC: 21 MMOL/L (ref 21–28)
INSPIRATION SETTING TIME VENT: 35
PCO2 BLDA: 36 MMHG (ref 35–46)
PHOSPHATE SERPL-MCNC: 3.4 MG/DL (ref 2.6–4.7)
PO2 BLDA: 75 MMHG (ref 75–108)
POTASSIUM SERPL-SCNC: 3.6 MMOL/L (ref 3.5–5.1)
SAO2 % BLDA: 94 % (ref 92–99)
SODIUM SERPL-SCNC: 135 MMOL/L (ref 136–145)

## 2020-04-11 RX ADMIN — DEXMEDETOMIDINE HYDROCHLORIDE PRN MLS/HR: 100 INJECTION, SOLUTION, CONCENTRATE INTRAVENOUS at 06:09

## 2020-04-11 RX ADMIN — MEROPENEM SCH MLS/HR: 500 INJECTION, POWDER, FOR SOLUTION INTRAVENOUS at 21:16

## 2020-04-11 RX ADMIN — PANTOPRAZOLE SODIUM SCH MG: 40 INJECTION, POWDER, FOR SOLUTION INTRAVENOUS at 08:51

## 2020-04-11 RX ADMIN — HEPARIN SODIUM SCH UNIT: 5000 INJECTION, SOLUTION INTRAVENOUS; SUBCUTANEOUS at 08:31

## 2020-04-11 RX ADMIN — MEROPENEM SCH MLS/HR: 500 INJECTION, POWDER, FOR SOLUTION INTRAVENOUS at 11:41

## 2020-04-11 RX ADMIN — Medication PRN EACH: at 12:00

## 2020-04-11 RX ADMIN — INSULIN LISPRO SCH UNITS: 100 INJECTION, SOLUTION INTRAVENOUS; SUBCUTANEOUS at 15:59

## 2020-04-11 RX ADMIN — DEXTROSE SCH MLS/HR: 50 INJECTION, SOLUTION INTRAVENOUS at 08:52

## 2020-04-11 RX ADMIN — HEPARIN SODIUM SCH UNIT: 5000 INJECTION, SOLUTION INTRAVENOUS; SUBCUTANEOUS at 21:21

## 2020-04-11 RX ADMIN — INSULIN LISPRO SCH UNITS: 100 INJECTION, SOLUTION INTRAVENOUS; SUBCUTANEOUS at 18:00

## 2020-04-11 RX ADMIN — DEXMEDETOMIDINE HYDROCHLORIDE PRN MLS/HR: 100 INJECTION, SOLUTION, CONCENTRATE INTRAVENOUS at 15:18

## 2020-04-11 RX ADMIN — INSULIN LISPRO SCH UNITS: 100 INJECTION, SOLUTION INTRAVENOUS; SUBCUTANEOUS at 06:20

## 2020-04-11 RX ADMIN — INSULIN LISPRO SCH UNITS: 100 INJECTION, SOLUTION INTRAVENOUS; SUBCUTANEOUS at 00:30

## 2020-04-11 RX ADMIN — DEXMEDETOMIDINE HYDROCHLORIDE PRN MLS/HR: 100 INJECTION, SOLUTION, CONCENTRATE INTRAVENOUS at 21:15

## 2020-04-11 NOTE — PDOC
PULMONARY PROGRESS NOTES


Subjective


Patient intubated on 3/23 , sedated


Patient currently on assist control ventilator


nursing reports pt. is having increase gastric secretions and even having 

gastric secretions for oral


Vitals





Vital Signs








  Date Time  Temp Pulse Resp B/P (MAP) Pulse Ox O2 Delivery O2 Flow Rate FiO2


 


4/11/20 08:23     98  6.0 


 


4/11/20 08:02      Ventilator  


 


4/11/20 06:00  95 18 127/74 (91)    


 


4/11/20 04:00 97.6       





 97.6       








Comments


Trach/sedated


Lungs:  Other (dimished in BLL)


Cardiovascular:  S1, S2


Abdomen:  Soft, Non-tender


Extremities:  Other (+3 generalized edema )


Skin:  Warm, Dry


Labs





Laboratory Tests








Test


 4/9/20


14:50 4/9/20


18:30 4/10/20


00:21 4/10/20


06:00


 


Glucose (Fingerstick)


 157 mg/dL


(70-99) 189 mg/dL


(70-99) 218 mg/dL


(70-99) 





 


White Blood Count


 


 


 


 13.1 x10^3/uL


(4.0-11.0)


 


Red Blood Count


 


 


 


 2.32 x10^6/uL


(3.50-5.40)


 


Hemoglobin


 


 


 


 7.4 g/dL


(12.0-15.5)


 


Hematocrit


 


 


 


 23.1 %


(36.0-47.0)


 


Mean Corpuscular Volume


 


 


 


 100 fL


()


 


Mean Corpuscular Hemoglobin    32 pg (25-35) 


 


Mean Corpuscular Hemoglobin


Concent 


 


 


 32 g/dL


(31-37)


 


Red Cell Distribution Width


 


 


 


 19.5 %


(11.5-14.5)


 


Platelet Count


 


 


 


 341 x10^3/uL


(140-400)


 


Neutrophils (%) (Auto)    60 % (31-73) 


 


Lymphocytes (%) (Auto)    17 % (24-48) 


 


Monocytes (%) (Auto)    13 % (0-9) 


 


Eosinophils (%) (Auto)    9 % (0-3) 


 


Basophils (%) (Auto)    1 % (0-3) 


 


Neutrophils # (Auto)


 


 


 


 7.9 x10^3/uL


(1.8-7.7)


 


Lymphocytes # (Auto)


 


 


 


 2.3 x10^3/uL


(1.0-4.8)


 


Monocytes # (Auto)


 


 


 


 1.7 x10^3/uL


(0.0-1.1)


 


Eosinophils # (Auto)


 


 


 


 1.1 x10^3/uL


(0.0-0.7)


 


Basophils # (Auto)


 


 


 


 0.1 x10^3/uL


(0.0-0.2)


 


Sodium Level


 


 


 


 137 mmol/L


(136-145)


 


Potassium Level


 


 


 


 3.8 mmol/L


(3.5-5.1)


 


Chloride Level


 


 


 


 99 mmol/L


()


 


Carbon Dioxide Level


 


 


 


 30 mmol/L


(21-32)


 


Anion Gap    8 (6-14) 


 


Blood Urea Nitrogen


 


 


 


 49 mg/dL


(7-20)


 


Creatinine


 


 


 


 2.3 mg/dL


(0.6-1.0)


 


Estimated GFR


(Cockcroft-Gault) 


 


 


 22.5 





 


Glucose Level


 


 


 


 209 mg/dL


(70-99)


 


Calcium Level


 


 


 


 8.4 mg/dL


(8.5-10.1)


 


Phosphorus Level


 


 


 


 2.6 mg/dL


(2.6-4.7)


 


Albumin


 


 


 


 3.0 g/dL


(3.4-5.0)


 


Test


 4/10/20


09:00 4/10/20


12:29 4/10/20


18:12 4/11/20


00:26


 


O2 Saturation 95 % (92-99)    


 


Arterial Blood pH


 7.43


(7.35-7.45) 


 


 





 


Arterial Blood pCO2 at


Patient Temp 44 mmHg


(35-46) 


 


 





 


Arterial Blood pO2 at Patient


Temp 81 mmHg


() 


 


 





 


Arterial Blood HCO3


 28 mmol/L


(21-28) 


 


 





 


Arterial Blood Base Excess


 4 mmol/L


(-3-3) 


 


 





 


FiO2 35% vent    


 


Glucose (Fingerstick)


 


 247 mg/dL


(70-99) 270 mg/dL


(70-99) 272 mg/dL


(70-99)


 


Test


 4/11/20


06:10 4/11/20


06:11 4/11/20


08:00 





 


Sodium Level


 135 mmol/L


(136-145) 


 


 





 


Potassium Level


 3.6 mmol/L


(3.5-5.1) 


 


 





 


Chloride Level


 98 mmol/L


() 


 


 





 


Carbon Dioxide Level


 25 mmol/L


(21-32) 


 


 





 


Anion Gap 12 (6-14)    


 


Blood Urea Nitrogen


 72 mg/dL


(7-20) 


 


 





 


Creatinine


 2.5 mg/dL


(0.6-1.0) 


 


 





 


Estimated GFR


(Cockcroft-Gault) 20.5 


 


 


 





 


Glucose Level


 268 mg/dL


(70-99) 


 


 





 


Calcium Level


 8.9 mg/dL


(8.5-10.1) 


 


 





 


Phosphorus Level


 3.4 mg/dL


(2.6-4.7) 


 


 





 


Albumin


 2.6 g/dL


(3.4-5.0) 


 


 





 


Glucose (Fingerstick)


 


 261 mg/dL


(70-99) 


 





 


O2 Saturation   94 % (92-99)  


 


Arterial Blood pH


 


 


 7.38


(7.35-7.45) 





 


Arterial Blood pCO2 at


Patient Temp 


 


 36 mmHg


(35-46) 





 


Arterial Blood pO2 at Patient


Temp 


 


 75 mmHg


() 





 


Arterial Blood HCO3


 


 


 21 mmol/L


(21-28) 





 


Arterial Blood Base Excess


 


 


 -4 mmol/L


(-3-3) 





 


FiO2   35  








Laboratory Tests








Test


 4/10/20


12:29 4/10/20


18:12 4/11/20


00:26 4/11/20


06:10


 


Glucose (Fingerstick)


 247 mg/dL


(70-99) 270 mg/dL


(70-99) 272 mg/dL


(70-99) 





 


Sodium Level


 


 


 


 135 mmol/L


(136-145)


 


Potassium Level


 


 


 


 3.6 mmol/L


(3.5-5.1)


 


Chloride Level


 


 


 


 98 mmol/L


()


 


Carbon Dioxide Level


 


 


 


 25 mmol/L


(21-32)


 


Anion Gap    12 (6-14) 


 


Blood Urea Nitrogen


 


 


 


 72 mg/dL


(7-20)


 


Creatinine


 


 


 


 2.5 mg/dL


(0.6-1.0)


 


Estimated GFR


(Cockcroft-Gault) 


 


 


 20.5 





 


Glucose Level


 


 


 


 268 mg/dL


(70-99)


 


Calcium Level


 


 


 


 8.9 mg/dL


(8.5-10.1)


 


Phosphorus Level


 


 


 


 3.4 mg/dL


(2.6-4.7)


 


Albumin


 


 


 


 2.6 g/dL


(3.4-5.0)


 


Test


 4/11/20


06:11 4/11/20


08:00 


 





 


Glucose (Fingerstick)


 261 mg/dL


(70-99) 


 


 





 


O2 Saturation  94 % (92-99)   


 


Arterial Blood pH


 


 7.38


(7.35-7.45) 


 





 


Arterial Blood pCO2 at


Patient Temp 


 36 mmHg


(35-46) 


 





 


Arterial Blood pO2 at Patient


Temp 


 75 mmHg


() 


 





 


Arterial Blood HCO3


 


 21 mmol/L


(21-28) 


 





 


Arterial Blood Base Excess


 


 -4 mmol/L


(-3-3) 


 





 


FiO2  35   








Medications





Active Scripts








 Medications  Dose


 Route/Sig


 Max Daily Dose Days Date Category


 


 Bisoprolol


 Fumarate 5 Mg


 Tablet  10 Mg


 PO DAILY


   3/16/20 Reported








Comments


Chest x-ray reviewed 4/6





IMPRESSION: 


1. Increased moderate to large bilateral posteriorly layering pleural 


effusions with associated atelectasis.





Impression


.


IMPRESSION:


1.  Acute hypoxemic respiratory failure secondary to ARDS status post trach,


2.  Gallstone pancreatitis. WITH NECROSIS


3.  Severe metabolic acidosis.stable


4.  Acute kidney injury-stable


5.  Acute gallstone pancreatitis.


6.  Hypoalbuminemia.


7.  Hypocalcemia.


8.  Leukocytosis


9.  Chronic anemia


10. Covid 19 testing negative


11. Acute /chronic anemia suspected hemorrhagic fluid in pelvis


12, Fever per ID-- resolved today





Plan


.


Cont. current vent setting, ABG reviewed no need for changes 35% and PEEP 5


Fever resolved today 


increased gastric secretions, will defer to surgery and ID


Discussed with RN and RT.  Pressure support as tolerated


Transfuse prn, check H/H 


Antibiotics per ID, 


Follow nephrology recs 


Nutritional support with TPN


Remains on low dose levophed 


Prognosis is guarded





DVT/GI PPX 


d/w RN/RT








CC time : 35 minutes reviewing labs, patient care and discussing will care team











STEVE MIRANDA MD              Apr 11, 2020 09:38

## 2020-04-11 NOTE — NUR
Pharmacy TPN Dosing Note



S: JESENIA RICH is a 49 year old F Currently receiving Central Continuous TPN started 
03/18/20



B:Pertinent PMH: 

Necrotizing pancreatitis

Height: 5 feet, 8 inches

Weight: 102.0 kg



Current diet: NPO 



LABS:

Sodium:    135 

Potassium: 3.6 

Chloride:  98 

Calcium:   8.9 

Corrected Calcium: 10.02 

Magnesium: 2.1 

CO2:       25 

SCr:       2.5 

Glucose:   268 

Albumin:   2.6 

AST:       47 

ALT:       20 



TPN FORMULA:

TPN TYPE:  Central Continuous

AMINO ACIDS:         125 gm

DEXTROSE:            225 gm

LIPIDS:              20 gm

SODIUM CHLORIDE:     90 mEq

SODIUM ACETATE:      -- mEq

SODIUM PHOSPHATE:    - mmol

POTASSIUM CHLORIDE:  - mEq

POTASSIUM ACETATE:   -- mEq

POTASSIUM PHOSPHATE: 15 mmol

MAGNESIUM:           12 mEq

CALCIUM:             15 mEq

INSULIN:             40 units

MULTIPLE VITAMIN:    10 ml

TRACE ELEMENTS:      0.5 ml(s)



TPN PLAN:  

Triglycerides >400 - lipid content reduced to 20g/bag, dextrose increased

to 225gm per dietician, check trigs again monday

Increase insulin to 40units





R: Continue TPN as written above.

Will monitor electrolytes, glucose, and tolerance to TPN.



 PRESTON MCCULLOUGH RUTHANN, 04/11/20 1200

## 2020-04-11 NOTE — PDOC
PROGRESS NOTES


Chief Complaint


Chief Complaint


Respiratory failure requiring mechanical ventilation (on vent since 3/23)


bilateral pleural effusions/pulm edema 


Sepsis


Severe Acute gallstone pancreatitis (not a surgical candidate at this time) with

necrosis


Acute kidney failure now requiring dialysis


Salpingitis


Gallstones (Calculus of gallbladder with acute cholecystitis without 

obstruction)


HTN 


Leukocytosis 


Hypoxia


Uterine fibroid


Intractable pain


Intractable nausea


Covid 19 negative. 


Acute on chronic anemia 


EEG: No seizure activity.


ESRD on HD


Hyperglycemia, persistantly in 200s





Plan:


continue supportive measures


vent management per pulm. pressure support as tolerated


dialysis per nephro


cont merrem (4/8) and Zyvox (4/10) and micafungin 


previously on cefepime, flagyl & dapto 


follow cultures


continue levophed support as needed 


seizures, neuro following


no plans for sx at present


check a1c, sugars in 200s, will increase sliding scale insulin and start basal 

insulin since persistently high sugars 


defer to sx regarding diet


awaiting for placement, she will probably will need to transition to LTAC





History of Present Illness


History of Present Illness


4/9/20: patient seen in ICU tolerating dialysis. sedated on vent. no acute 

issues overnight. no fever today. 





4/7/2020


No acute events reported overnight, case discussed with nursing staff patient in

no acute distress no complaints during my visit, most likely patient will be 

moving to LTAC soon





4/6/2020


Plans for trach int he am and dialysis in the afternoon, no acute events 

reported overnight. 





4/5/2020


No acute events reported overnight, case discussed with nursing staff patient in

no acute distress during my visit





4/4/2020


No acute events reported overnight, patient receiving dialysis today, case 

discussed with nursing staff patient in no acute distress during my visit








4/3/2020


No new changes remains critically ill, off CRRT, still planning for trach on 

Monday unless we manage to wean over the weekend





4/2/2020


Continues to remain critically ill.  The patient unable to be taken off 

ventilatory support as of yet.,  Discussed with pulmonary consultant.  Planning 

all tracheostomy on Monday if he is unable to be weaned off vent





4/1/2020


No acute events reported overnight, no fever or chills, remains critically 

stable, discussed with mother at bedside. 





[id number]


Patient seen and examined in the ICU


She is critically ill


Mechanically ventilated


Assist-control/25/450/30 with 8 of PEEP


She is on cefepime daptomycin Flagyl and micafungin GEN for antibiotic coverage


Also getting TPN and CRRT


Sedated with Versed


Getting IV albumin also


She is tachycardic at 112 bpm


Temp max 100.0


White blood count is down from 45,000 35,000 today


Chart reviewed


Discussed with RN











[id number]


Patient seen and examined in the ICU


She remains very critically ill


Going for a CT of the abdomen chest and pelvis


Ventilated : On assist control 40% FiO2


Discussed with RN


Chart reviewed








[id number]


Patient seen and examined in the ICU


She is still on CRRT although we plan to change to hemodialysis soon


Chart reviewed


Discussed with RN


Hemoglobin down to 6.9 (suspect CRRT related , Will let nephrology consider 

transfusion while on dialysis)








[id number]


Patient seen and examined in the ICU


She is mechanically ventilated


Before meals/25/450/30%


Also on CRRT


Very critically ill


Chart reviewed


Discussed with RN











[id number]


Patient seen and examined in the ICU


She is now been transferred to the Covid 19 unit so we can rule out Covid and 

keep her in isolation


Very critically ill


Intubated and  ventilated


Assist control 40%


Chart reviewed


Discussed with RN


Still on CRRT


Getting a chest x-ray now


Very concerned about her prognosis








[id number]


Patient seen and examined in the ICU


She is extremely critically ill


Remains mechanically ventilated with assist control/25/450/40%


Also on continuous renal replacement therapy


Chart reviewed


Discussed with RN


She has TPN hanging


Also on pressors











[id number]


Patient seen and examined in the ICU


She is still requiring CRRT


On the vent with assist control but her FiO2 is down to 40% from yesterday


Slightly better but still very critically ill


Discussed with RN


Chart reviewed








[id number]


Patient seen and examined in the ICU


She remains extremely critically ill


On IV fentanyl IV Versed IV Doxy


On the vent


Assist-control/25/450/70%


She is critically ill


Discussed with RN


Chart reviewed


Patient is currently on CRRT as well








[id number]


Patient seen and examined in the ICU


She had to be intubated this morning


On assist-control 25/450/100% with 10 of PEEP and only satting 87%


She is extremely critically ill


I'm not sure if she will survive


Chart reviewed


Discussed with RN











Ms Tadeo is a 48yo F w/ PMHx HTN, prediabetes who presents the emergency room

complaints of abdominal pain. Patient described off and on 3 days. She states is

constant, described as a squeezing sensation in a band-like distribution. + 

nausea, vomiting.  She denies any fever or diarrhea.  Patient denies any 

abdominal surgical procedures.  She states is worse with movements, car ride.  

Pain initially was upper abdomen however now pretty much generalized.  Last 

bowel movement was 3/15/2020. Nothing makes her pain better.  Patient denies any

shortness of breath.  She does state the pain moves into her chest.  Denies any 

headache or visual changes.


Lipase 01055, , , Bilirubin 1.4.


CT abdomen confirms pancreatic inflammation, peripancreatic fluid and 

inflammatory changes around the pancreas consistent with pancreatitis. 

Cholelithiasis and 1.4cm uterine fibroid as well as possible left salpingitis. 

Admitted for further care


GI, General surgery, ID, Pulm consulted.





3/17: Overnight per report no urine output. Added dilaudid for pain, PICC placed

per IR. Renal US negative.Seen bedside in ICU, given 2L additional NSS and 

albumin infusion. Still hypotensive, started on levophed. Repeat CT abdomen with

necrosis. Updated her fiancee


3/18: Sats are only 87% on nasal cannula oxygen. Dialysis catheter per 

nephrology


3/19: She is now on BiPAP appears more ill, now on dialysis


3/20: Seen on BiPAP. Her mother and another family member are present and seemed

to be good support for her. Currently on dialysis. Appears critically ill


3/21: Overnight Tmax 101.7 , still on BiPAP FiO2 40%, still on low dose Levophed

gtt, TPN initiated. On dialysis





Overnight still febrile. Dialysis today. She wakes up and responds to pain. No 

CP.





Vitals


Vitals





Vital Signs








  Date Time  Temp Pulse Resp B/P (MAP) Pulse Ox O2 Delivery O2 Flow Rate FiO2


 


4/11/20 08:53   20  98 Ventilator  


 


4/11/20 08:23       6.0 


 


4/11/20 06:00  95  127/74 (91)    


 


4/11/20 04:00 97.6       





 97.6       











Physical Exam


Physical Exam


GENERAL: Sedated, generalized anasarca 


HEENT: Pupils equal, + NGT, green bile appearing secretions in mouth 


NECK:  Trach/vent 


LUNGS: Diminished aeration bases 


HEART:  S1, S2, regular 


ABDOMEN:  Distended, hypoactive BS, Rectal tube in place 


: [place]   


EXTREMITIES: Generalized edema, no cyanosis, SCDs bilaterally 


DERMATOLOGIC:  Warm and dry.  No generalized rash.  


CENTRAL NERVOUS SYSTEM: Opens eyes to tactile stimuli


HDC, LIJ and RUE-PICC without signs of complications


General:  No acute distress


Heart:  Other (increased rate)


Lungs:  Other (dimished in BLL)


Abdomen:  Soft, No tenderness


Extremities:  No edema, Other (SOME CLUBBING )


Skin:  Other (mottling noted to extremities )





Labs


LABS





Laboratory Tests








Test


 4/10/20


12:29 4/10/20


18:12 4/11/20


00:26 4/11/20


06:10


 


Glucose (Fingerstick)


 247 mg/dL


(70-99) 270 mg/dL


(70-99) 272 mg/dL


(70-99) 





 


Sodium Level


 


 


 


 135 mmol/L


(136-145)


 


Potassium Level


 


 


 


 3.6 mmol/L


(3.5-5.1)


 


Chloride Level


 


 


 


 98 mmol/L


()


 


Carbon Dioxide Level


 


 


 


 25 mmol/L


(21-32)


 


Anion Gap    12 (6-14) 


 


Blood Urea Nitrogen


 


 


 


 72 mg/dL


(7-20)


 


Creatinine


 


 


 


 2.5 mg/dL


(0.6-1.0)


 


Estimated GFR


(Cockcroft-Gault) 


 


 


 20.5 





 


Glucose Level


 


 


 


 268 mg/dL


(70-99)


 


Calcium Level


 


 


 


 8.9 mg/dL


(8.5-10.1)


 


Phosphorus Level


 


 


 


 3.4 mg/dL


(2.6-4.7)


 


Albumin


 


 


 


 2.6 g/dL


(3.4-5.0)


 


Test


 4/11/20


06:11 4/11/20


08:00 


 





 


Glucose (Fingerstick)


 261 mg/dL


(70-99) 


 


 





 


O2 Saturation  94 % (92-99)   


 


Arterial Blood pH


 


 7.38


(7.35-7.45) 


 





 


Arterial Blood pCO2 at


Patient Temp 


 36 mmHg


(35-46) 


 





 


Arterial Blood pO2 at Patient


Temp 


 75 mmHg


() 


 





 


Arterial Blood HCO3


 


 21 mmol/L


(21-28) 


 





 


Arterial Blood Base Excess


 


 -4 mmol/L


(-3-3) 


 





 


FiO2  35   











Assessment and Plan


Assessmemt and Plan


Problems


Medical Problems:


(1) Acute pancreatitis


Status: Acute  





(2) Cholelithiasis


Status: Acute  











Comment


Review of Relevant


I have reviewed the following items josy (where applicable) has been applied.


Labs





Laboratory Tests








Test


 4/9/20


14:50 4/9/20


18:30 4/10/20


00:21 4/10/20


06:00


 


Glucose (Fingerstick)


 157 mg/dL


(70-99) 189 mg/dL


(70-99) 218 mg/dL


(70-99) 





 


White Blood Count


 


 


 


 13.1 x10^3/uL


(4.0-11.0)


 


Red Blood Count


 


 


 


 2.32 x10^6/uL


(3.50-5.40)


 


Hemoglobin


 


 


 


 7.4 g/dL


(12.0-15.5)


 


Hematocrit


 


 


 


 23.1 %


(36.0-47.0)


 


Mean Corpuscular Volume


 


 


 


 100 fL


()


 


Mean Corpuscular Hemoglobin    32 pg (25-35) 


 


Mean Corpuscular Hemoglobin


Concent 


 


 


 32 g/dL


(31-37)


 


Red Cell Distribution Width


 


 


 


 19.5 %


(11.5-14.5)


 


Platelet Count


 


 


 


 341 x10^3/uL


(140-400)


 


Neutrophils (%) (Auto)    60 % (31-73) 


 


Lymphocytes (%) (Auto)    17 % (24-48) 


 


Monocytes (%) (Auto)    13 % (0-9) 


 


Eosinophils (%) (Auto)    9 % (0-3) 


 


Basophils (%) (Auto)    1 % (0-3) 


 


Neutrophils # (Auto)


 


 


 


 7.9 x10^3/uL


(1.8-7.7)


 


Lymphocytes # (Auto)


 


 


 


 2.3 x10^3/uL


(1.0-4.8)


 


Monocytes # (Auto)


 


 


 


 1.7 x10^3/uL


(0.0-1.1)


 


Eosinophils # (Auto)


 


 


 


 1.1 x10^3/uL


(0.0-0.7)


 


Basophils # (Auto)


 


 


 


 0.1 x10^3/uL


(0.0-0.2)


 


Sodium Level


 


 


 


 137 mmol/L


(136-145)


 


Potassium Level


 


 


 


 3.8 mmol/L


(3.5-5.1)


 


Chloride Level


 


 


 


 99 mmol/L


()


 


Carbon Dioxide Level


 


 


 


 30 mmol/L


(21-32)


 


Anion Gap    8 (6-14) 


 


Blood Urea Nitrogen


 


 


 


 49 mg/dL


(7-20)


 


Creatinine


 


 


 


 2.3 mg/dL


(0.6-1.0)


 


Estimated GFR


(Cockcroft-Gault) 


 


 


 22.5 





 


Glucose Level


 


 


 


 209 mg/dL


(70-99)


 


Calcium Level


 


 


 


 8.4 mg/dL


(8.5-10.1)


 


Phosphorus Level


 


 


 


 2.6 mg/dL


(2.6-4.7)


 


Albumin


 


 


 


 3.0 g/dL


(3.4-5.0)


 


Test


 4/10/20


09:00 4/10/20


12:29 4/10/20


18:12 4/11/20


00:26


 


O2 Saturation 95 % (92-99)    


 


Arterial Blood pH


 7.43


(7.35-7.45) 


 


 





 


Arterial Blood pCO2 at


Patient Temp 44 mmHg


(35-46) 


 


 





 


Arterial Blood pO2 at Patient


Temp 81 mmHg


() 


 


 





 


Arterial Blood HCO3


 28 mmol/L


(21-28) 


 


 





 


Arterial Blood Base Excess


 4 mmol/L


(-3-3) 


 


 





 


FiO2 35% vent    


 


Glucose (Fingerstick)


 


 247 mg/dL


(70-99) 270 mg/dL


(70-99) 272 mg/dL


(70-99)


 


Test


 4/11/20


06:10 4/11/20


06:11 4/11/20


08:00 





 


Sodium Level


 135 mmol/L


(136-145) 


 


 





 


Potassium Level


 3.6 mmol/L


(3.5-5.1) 


 


 





 


Chloride Level


 98 mmol/L


() 


 


 





 


Carbon Dioxide Level


 25 mmol/L


(21-32) 


 


 





 


Anion Gap 12 (6-14)    


 


Blood Urea Nitrogen


 72 mg/dL


(7-20) 


 


 





 


Creatinine


 2.5 mg/dL


(0.6-1.0) 


 


 





 


Estimated GFR


(Cockcroft-Gault) 20.5 


 


 


 





 


Glucose Level


 268 mg/dL


(70-99) 


 


 





 


Calcium Level


 8.9 mg/dL


(8.5-10.1) 


 


 





 


Phosphorus Level


 3.4 mg/dL


(2.6-4.7) 


 


 





 


Albumin


 2.6 g/dL


(3.4-5.0) 


 


 





 


Glucose (Fingerstick)


 


 261 mg/dL


(70-99) 


 





 


O2 Saturation   94 % (92-99)  


 


Arterial Blood pH


 


 


 7.38


(7.35-7.45) 





 


Arterial Blood pCO2 at


Patient Temp 


 


 36 mmHg


(35-46) 





 


Arterial Blood pO2 at Patient


Temp 


 


 75 mmHg


() 





 


Arterial Blood HCO3


 


 


 21 mmol/L


(21-28) 





 


Arterial Blood Base Excess


 


 


 -4 mmol/L


(-3-3) 





 


FiO2   35  








Laboratory Tests








Test


 4/10/20


12:29 4/10/20


18:12 4/11/20


00:26 4/11/20


06:10


 


Glucose (Fingerstick)


 247 mg/dL


(70-99) 270 mg/dL


(70-99) 272 mg/dL


(70-99) 





 


Sodium Level


 


 


 


 135 mmol/L


(136-145)


 


Potassium Level


 


 


 


 3.6 mmol/L


(3.5-5.1)


 


Chloride Level


 


 


 


 98 mmol/L


()


 


Carbon Dioxide Level


 


 


 


 25 mmol/L


(21-32)


 


Anion Gap    12 (6-14) 


 


Blood Urea Nitrogen


 


 


 


 72 mg/dL


(7-20)


 


Creatinine


 


 


 


 2.5 mg/dL


(0.6-1.0)


 


Estimated GFR


(Cockcroft-Gault) 


 


 


 20.5 





 


Glucose Level


 


 


 


 268 mg/dL


(70-99)


 


Calcium Level


 


 


 


 8.9 mg/dL


(8.5-10.1)


 


Phosphorus Level


 


 


 


 3.4 mg/dL


(2.6-4.7)


 


Albumin


 


 


 


 2.6 g/dL


(3.4-5.0)


 


Test


 4/11/20


06:11 4/11/20


08:00 


 





 


Glucose (Fingerstick)


 261 mg/dL


(70-99) 


 


 





 


O2 Saturation  94 % (92-99)   


 


Arterial Blood pH


 


 7.38


(7.35-7.45) 


 





 


Arterial Blood pCO2 at


Patient Temp 


 36 mmHg


(35-46) 


 





 


Arterial Blood pO2 at Patient


Temp 


 75 mmHg


() 


 





 


Arterial Blood HCO3


 


 21 mmol/L


(21-28) 


 





 


Arterial Blood Base Excess


 


 -4 mmol/L


(-3-3) 


 





 


FiO2  35   








Microbiology


4/5/20 Blood Culture - Final, Complete


         NO GROWTH AFTER 5 DAYS


4/4/20 Urine Culture - Final, Complete


         


4/4/20 Urine Culture Result 1 ([facility]) - Final, Complete


Medications





Current Medications


Sodium Chloride 1,000 ml @  1,000 mls/hr Q1H IV  Last administered on 3/16/20at 

03:00;  Start 3/16/20 at 03:00;  Stop 3/16/20 at 03:59;  Status DC


Ondansetron HCl (Zofran) 4 mg 1X  ONCE IVP  Last administered on 3/16/20at 

03:27;  Start 3/16/20 at 03:00;  Stop 3/16/20 at 03:01;  Status DC


Morphine Sulfate (Morphine Sulfate) 4 mg 1X  ONCE IV ;  Start 3/16/20 at 03:00; 

Stop 3/16/20 at 03:01;  Status Cancel


Ketorolac Tromethamine (Toradol 30mg Vial) 30 mg 1X  ONCE IV  Last administered 

on 3/16/20at 02:54;  Start 3/16/20 at 03:00;  Stop 3/16/20 at 03:01;  Status DC


Fentanyl Citrate (Fentanyl 2ml Vial) 25 mcg 1X  ONCE IVP  Last administered on 

3/16/20at 03:23;  Start 3/16/20 at 03:30;  Stop 3/16/20 at 03:31;  Status DC


Fentanyl Citrate (Fentanyl 2ml Vial) 100 mcg STK-MED ONCE .ROUTE ;  Start 

3/16/20 at 03:18;  Stop 3/16/20 at 03:18;  Status DC


Iohexol (Omnipaque 350 Mg/ml) 90 ml 1X  ONCE IV  Last administered on 3/16/20at 

03:25;  Start 3/16/20 at 03:30;  Stop 3/16/20 at 03:31;  Status DC


Info (CONTRAST GIVEN -- Rx MONITORING) 1 each PRN DAILY  PRN MC SEE COMMENTS;  

Start 3/16/20 at 03:30;  Stop 3/18/20 at 03:29;  Status DC


Hydromorphone HCl (Dilaudid) 0.5 mg 1X  ONCE IV  Last administered on 3/16/20at 

03:55;  Start 3/16/20 at 04:30;  Stop 3/16/20 at 04:32;  Status DC


Ondansetron HCl (Zofran) 4 mg PRN Q8HRS  PRN IV NAUSEA/VOMITING 1ST CHOICE;  

Start 3/16/20 at 05:00;  Stop 3/16/20 at 09:27;  Status DC


Morphine Sulfate (Morphine Sulfate) 2 mg PRN Q2HR  PRN IV SEVERE PAIN 7-10 Last 

administered on 3/17/20at 12:26;  Start 3/16/20 at 05:00;  Stop 3/17/20 at 

14:15;  Status DC


Sodium Chloride 1,000 ml @  125 mls/hr Q8H IV  Last administered on 3/16/20at 

20:56;  Start 3/16/20 at 05:00;  Stop 3/17/20 at 04:59;  Status DC


Hydromorphone HCl (Dilaudid) 0.5 mg PRN Q3HRS  PRN IV SEVERE PAIN 7-10 Last 

administered on 3/17/20at 10:06;  Start 3/16/20 at 05:00;  Stop 3/17/20 at 

12:01;  Status DC


Piperacillin Sod/ Tazobactam Sod 4.5 gm/Sodium Chloride 100 ml @  200 mls/hr 1X 

ONCE IV  Last administered on 3/16/20at 05:44;  Start 3/16/20 at 06:00;  Stop 

3/16/20 at 06:29;  Status DC


Ondansetron HCl (Zofran) 4 mg PRN Q4HRS  PRN IV NAUSEA/VOMITING 1ST CHOICE Last 

administered on 3/21/20at 16:15;  Start 3/16/20 at 09:30


Insulin Human Lispro (HumaLOG) 0-9 UNITS Q6HRS SQ  Last administered on 

4/11/20at 06:20;  Start 3/16/20 at 09:30


Dextrose (Dextrose 50%-Water Syringe) 12.5 gm PRN Q15MIN  PRN IV SEE COMMENTS;  

Start 3/16/20 at 09:30


Pantoprazole Sodium (PROTONIX VIAL for IV PUSH) 40 mg DAILYAC IVP  Last 

administered on 4/11/20at 08:51;  Start 3/16/20 at 11:30


Prochlorperazine Edisylate (Compazine) 10 mg PRN Q6HRS  PRN IV NAUSEA/VOMITING, 

2nd CHOICE Last administered on 3/17/20at 00:42;  Start 3/16/20 at 17:45


Atenolol (Tenormin) 100 mg DAILY PO ;  Start 3/17/20 at 09:00;  Stop 3/16/20 at 

20:08;  Status DC


Metoprolol Tartrate (Lopressor Vial) 2.5 mg Q6HRS IVP  Last administered on 

3/17/20at 05:51;  Start 3/16/20 at 20:15;  Stop 3/17/20 at 10:02;  Status DC


Metoprolol Tartrate (Lopressor Vial) 5 mg Q6HRS IVP  Last administered on 3/26

/20at 00:12;  Start 3/17/20 at 10:15;  Stop 3/28/20 at 08:48;  Status DC


Hydromorphone HCl (Dilaudid) 1 mg PRN Q3HRS  PRN IV SEVERE PAIN 7-10 Last 

administered on 3/23/20at 05:13;  Start 3/17/20 at 12:00;  Stop 3/31/20 at 

00:25;  Status DC


Lidocaine HCl (Buffered Lidocaine 1%) 3 ml STK-MED ONCE .ROUTE ;  Start 3/17/20 

at 12:55;  Stop 3/17/20 at 12:56;  Status DC


Albumin Human 500 ml @  125 mls/hr 1X  ONCE IV  Last administered on 3/17/20at 

14:33;  Start 3/17/20 at 14:30;  Stop 3/17/20 at 18:32;  Status DC


Norepinephrine Bitartrate 8 mg/ Dextrose 258 ml @  17.299 mls/ hr CONT  PRN IV 

PER PROTOCOL Last administered on 4/10/20at 13:31;  Start 3/17/20 at 15:30


Sodium Chloride 1,000 ml @  125 mls/hr Q8H IV  Last administered on 3/17/20at 

21:04;  Start 3/17/20 at 16:00;  Stop 3/18/20 at 02:42;  Status DC


Albumin Human 500 ml @  125 mls/hr PRN BID  PRN IV After every 2L NSS & BP < 

90mm Last administered on 4/1/20at 14:21;  Start 3/17/20 at 16:00


Iohexol (Omnipaque 300 Mg/ml) 60 ml 1X  ONCE IV  Last administered on 3/17/20at 

17:20;  Start 3/17/20 at 17:00;  Stop 3/17/20 at 17:01;  Status DC


Info (CONTRAST GIVEN -- Rx MONITORING) 1 each PRN DAILY  PRN MC SEE COMMENTS;  

Start 3/17/20 at 17:00;  Stop 3/19/20 at 16:59;  Status DC


Meropenem 1 gm/ Sodium Chloride 100 ml @  200 mls/hr Q8HRS IV  Last administered

on 3/18/20at 05:45;  Start 3/17/20 at 20:00;  Stop 3/18/20 at 08:48;  Status DC


Furosemide (Lasix) 40 mg 1X  ONCE IVP  Last administered on 3/17/20at 22:12;  

Start 3/17/20 at 22:30;  Stop 3/17/20 at 22:31;  Status DC


Calcium Chloride 1000 mg/Sodium Chloride 110 ml @  220 mls/hr 1X  ONCE IV  Last 

administered on 3/17/20at 22:11;  Start 3/17/20 at 22:30;  Stop 3/17/20 at 

22:59;  Status DC


Albuterol Sulfate (Ventolin Neb Soln) 2.5 mg 1X  ONCE NEB  Last administered on 

3/18/20at 00:56;  Start 3/17/20 at 22:30;  Stop 3/17/20 at 22:31;  Status DC


Insulin Human Regular (HumuLIN R VIAL) 5 unit 1X  ONCE IV  Last administered on 

3/17/20at 22:14;  Start 3/17/20 at 22:30;  Stop 3/17/20 at 22:31;  Status DC


Magnesium Sulfate 50 ml @ 25 mls/hr 1X  ONCE IV  Last administered on 3/18/20at 

02:57;  Start 3/18/20 at 03:00;  Stop 3/18/20 at 04:59;  Status DC


Calcium Gluconate 1000 mg/Sodium Chloride 110 ml @  220 mls/hr 1X  ONCE IV  Last

administered on 3/18/20at 02:46;  Start 3/18/20 at 03:00;  Stop 3/18/20 at 

03:29;  Status DC


Sodium Chloride 1,000 ml @  200 mls/hr Q5H IV  Last administered on 3/18/20at 

02:46;  Start 3/18/20 at 03:00;  Stop 3/18/20 at 10:21;  Status DC


Calcium Gluconate 1000 mg/Sodium Chloride 110 ml @  220 mls/hr 1X  ONCE IV  Last

administered on 3/18/20at 03:21;  Start 3/18/20 at 03:30;  Stop 3/18/20 at 

03:59;  Status DC


Sodium Bicarbonate 50 meq/Sodium Chloride 1,050 ml @  75 mls/hr Q14H IV  Last 

administered on 3/22/20at 21:10;  Start 3/18/20 at 07:30;  Stop 3/23/20 at 

10:28;  Status DC


Calcium Gluconate 2000 mg/Sodium Chloride 120 ml @  220 mls/hr 1X  ONCE IV  Last

administered on 3/18/20at 09:05;  Start 3/18/20 at 07:30;  Stop 3/18/20 at 

08:02;  Status DC


Lidocaine HCl (Xylocaine-Mpf 1% 2ml Vial) 2 ml STK-MED ONCE .ROUTE ;  Start 

3/18/20 at 08:47;  Stop 3/18/20 at 08:47;  Status DC


Meropenem 500 mg/ Sodium Chloride 50 ml @  100 mls/hr Q12HR IV  Last 

administered on 3/23/20at 21:01;  Start 3/18/20 at 18:00;  Stop 3/24/20 at 

07:58;  Status DC


Lidocaine HCl (Buffered Lidocaine 1%) 3 ml STK-MED ONCE .ROUTE ;  Start 3/18/20 

at 09:46;  Stop 3/18/20 at 09:46;  Status DC


Lidocaine HCl (Buffered Lidocaine 1%) 6 ml 1X  ONCE INJ  Last administered on 

3/18/20at 10:26;  Start 3/18/20 at 10:15;  Stop 3/18/20 at 10:16;  Status DC


Info (Tpn Per Pharmacy) 1 each PRN DAILY  PRN MC SEE COMMENTS Last administered 

on 4/10/20at 11:58;  Start 3/18/20 at 12:00


Sodium Chloride 1,000 ml @  1,000 mls/hr Q1H PRN IV hypotension;  Start 3/18/20 

at 12:07;  Stop 3/18/20 at 18:06;  Status DC


Diphenhydramine HCl (Benadryl) 25 mg 1X PRN  PRN IV ITCHING;  Start 3/18/20 at 

12:15;  Stop 3/19/20 at 12:14;  Status DC


Diphenhydramine HCl (Benadryl) 25 mg 1X PRN  PRN IV ITCHING;  Start 3/18/20 at 

12:15;  Stop 3/19/20 at 12:14;  Status DC


Sodium Chloride 1,000 ml @  400 mls/hr Q2H30M PRN IV PATENCY;  Start 3/18/20 at 

12:07;  Stop 3/19/20 at 00:06;  Status DC


Info (PHARMACY MONITORING -- do not chart) 1 each PRN DAILY  PRN MC SEE 

COMMENTS;  Start 3/18/20 at 12:15;  Stop 3/20/20 at 08:13;  Status DC


Sodium Chloride 90 meq/Calcium Gluconate 10 meq/ Multivitamins 10 ml/Chromium/ 

Copper/Manganese/ Seleni/Zn 1 ml/ Total Parenteral Nutrition/Amino 

Acids/Dextrose/ Fat Emulsion Intravenous 55.005 ml  @ 2.292 mls/hr TPN  CONT IV 

;  Start 3/18/20 at 22:00;  Stop 3/18/20 at 12:33;  Status DC


Info (Tpn Per Pharmacy) 1 each PRN DAILY  PRN MC SEE COMMENTS;  Start 3/18/20 at

12:30;  Status UNV


Sodium Chloride 90 meq/Calcium Gluconate 10 meq/ Multivitamins 10 ml/Chromium/ 

Copper/Manganese/ Seleni/Zn 0.5 ml/ Total Parenteral Nutrition/Amino 

Acids/Dextrose/ Fat Emulsion Intravenous 1,512 ml @  63 mls/hr TPN  CONT IV  

Last administered on 3/18/20at 22:06;  Start 3/18/20 at 22:00;  Stop 3/19/20 at 

21:59;  Status DC


Calcium Carbonate/ Glycine (Tums) 500 mg PRN AFTMEALHC  PRN PO INDIGESTION;  

Start 3/18/20 at 17:45


Calcium Gluconate (Calcium Gluconate) 2,000 mg 1X  ONCE IVP  Last administered 

on 3/19/20at 02:19;  Start 3/19/20 at 02:15;  Stop 3/19/20 at 02:16;  Status DC


Calcium Chloride 3000 mg/Sodium Chloride 1,030 ml @  50 mls/hr [id number] IV  Last 

administered on 3/21/20at 02:17;  Start 3/19/20 at 08:00;  Stop 3/21/20 at 

15:23;  Status DC


Lorazepam (Ativan Inj) 1 mg PRN Q4HRS  PRN IVP ANXIETY / AGITATION Last 

administered on 3/23/20at 00:34;  Start 3/19/20 at 09:00


Sodium Chloride 1,000 ml @  1,000 mls/hr Q1H PRN IV hypotension;  Start 3/19/20 

at 08:56;  Stop 3/19/20 at 14:55;  Status DC


Albumin Human 200 ml @  200 mls/hr 1X PRN  PRN IV Hypotension;  Start 3/19/20 at

09:00;  Stop 3/19/20 at 14:59;  Status DC


Diphenhydramine HCl (Benadryl) 25 mg 1X PRN  PRN IV ITCHING;  Start 3/19/20 at 

09:00;  Stop 3/20/20 at 08:59;  Status DC


Diphenhydramine HCl (Benadryl) 25 mg 1X PRN  PRN IV ITCHING;  Start 3/19/20 at 

09:00;  Stop 3/20/20 at 08:59;  Status DC


Sodium Chloride 1,000 ml @  400 mls/hr Q2H30M PRN IV PATENCY;  Start 3/19/20 at 

08:56;  Stop 3/19/20 at 20:55;  Status DC


Info (PHARMACY MONITORING -- do not chart) 1 each PRN DAILY  PRN MC SEE 

COMMENTS;  Start 3/19/20 at 09:00;  Status UNV


Info (PHARMACY MONITORING -- do not chart) 1 each PRN DAILY  PRN MC SEE 

COMMENTS;  Start 3/19/20 at 09:00;  Stop 3/20/20 at 08:13;  Status DC


Digoxin (Lanoxin) 500 mcg 1X  ONCE IV  Last administered on 3/19/20at 10:04;  

Start 3/19/20 at 10:00;  Stop 3/19/20 at 10:01;  Status DC


Digoxin (Lanoxin) 125 mcg 1X  ONCE IV  Last administered on 3/19/20at 17:10;  

Start 3/19/20 at 18:00;  Stop 3/19/20 at 18:01;  Status DC


Magnesium Sulfate 100 ml @  25 mls/hr 1X  ONCE IV  Last administered on 

3/19/20at 12:48;  Start 3/19/20 at 13:00;  Stop 3/19/20 at 16:59;  Status DC


Sodium Chloride 90 meq/Magnesium Sulfate 10 meq/ Calcium Gluconate 20 meq/ 

Multivitamins 10 ml/Chromium/ Copper/Manganese/ Seleni/Zn 0.5 ml/ Total 

Parenteral Nutrition/Amino Acids/Dextrose/ Fat Emulsion Intravenous 1,512 ml @  

63 mls/hr TPN  CONT IV  Last administered on 3/19/20at 22:25;  Start 3/19/20 at 

22:00;  Stop 3/20/20 at 21:59;  Status DC


Sodium Chloride 1,000 ml @  1,000 mls/hr Q1H PRN IV hypotension;  Start 3/20/20 

at 08:05;  Stop 3/20/20 at 14:04;  Status DC


Albumin Human 200 ml @  200 mls/hr 1X  ONCE IV  Last administered on 3/20/20at 

08:57;  Start 3/20/20 at 08:15;  Stop 3/20/20 at 09:14;  Status DC


Diphenhydramine HCl (Benadryl) 25 mg 1X PRN  PRN IV ITCHING;  Start 3/20/20 at 

08:15;  Stop 3/21/20 at 08:14;  Status DC


Diphenhydramine HCl (Benadryl) 25 mg 1X PRN  PRN IV ITCHING;  Start 3/20/20 at 

08:15;  Stop 3/21/20 at 08:14;  Status DC


Sodium Chloride 1,000 ml @  400 mls/hr Q2H30M PRN IV PATENCY;  Start 3/20/20 at 

08:05;  Stop 3/20/20 at 20:04;  Status DC


Info (PHARMACY MONITORING -- do not chart) 1 each PRN DAILY  PRN MC SEE 

COMMENTS;  Start 3/20/20 at 08:15;  Stop 3/24/20 at 07:57;  Status DC


Sodium Chloride 90 meq/Potassium Chloride 15 meq/ Potassium Phosphate 10 mmol/ 

Magnesium Sulfate 10 meq/Calcium Gluconate 20 meq/ Multivitamins 10 ml/Chromium/

Copper/Manganese/ Seleni/Zn 0.5 ml/ Total Parenteral Nutrition/Amino 

Acids/Dextrose/ Fat Emulsion Intravenous 1,512 ml @  63 mls/hr TPN  CONT IV  

Last administered on 3/20/20at 21:01;  Start 3/20/20 at 22:00;  Stop 3/21/20 at 

21:59;  Status DC


Potassium Chloride/Water 100 ml @  100 mls/hr 1X  ONCE IV  Last administered on 

3/20/20at 14:09;  Start 3/20/20 at 14:00;  Stop 3/20/20 at 14:59;  Status DC


Benzocaine (Hurricaine One) 1 spray 1X  ONCE MM  Last administered on 3/20/20at 

16:38;  Start 3/20/20 at 14:30;  Stop 3/20/20 at 14:31;  Status DC


Lidocaine HCl (Glydo (Lidocaine) Jelly) 1 thomas 1X  ONCE MM  Last administered on 

3/20/20at 16:38;  Start 3/20/20 at 14:30;  Stop 3/20/20 at 14:31;  Status DC


Linezolid/Dextrose 300 ml @  300 mls/hr Q12HR IV  Last administered on 3/26/20at

21:04;  Start 3/20/20 at 20:00;  Stop 3/27/20 at 07:50;  Status DC


Acetaminophen (Tylenol) 650 mg PRN Q6HRS  PRN PO MILD PAIN / TEMP;  Start 

3/21/20 at 03:30;  Stop 3/21/20 at 03:36;  Status DC


Acetaminophen (Tylenol) 650 mg PRN Q6HRS  PRN PEG MILD PAIN / TEMP Last 

administered on 3/26/20at 07:35;  Start 3/21/20 at 03:36


Sodium Chloride 1,000 ml @  1,000 mls/hr Q1H PRN IV hypotension;  Start 3/21/20 

at 07:50;  Stop 3/21/20 at 13:49;  Status DC


Albumin Human 200 ml @  200 mls/hr 1X PRN  PRN IV Hypotension;  Start 3/21/20 at

08:00;  Stop 3/21/20 at 13:59;  Status DC


Sodium Chloride (Normal Saline Flush) 10 ml 1X PRN  PRN IV AP catheter pack;  

Start 3/21/20 at 08:00;  Stop 3/22/20 at 07:59;  Status DC


Sodium Chloride (Normal Saline Flush) 10 ml 1X PRN  PRN IV  catheter pack;  

Start 3/21/20 at 08:00;  Stop 3/22/20 at 07:59;  Status DC


Sodium Chloride 1,000 ml @  400 mls/hr Q2H30M PRN IV PATENCY;  Start 3/21/20 at 

07:50;  Stop 3/21/20 at 19:49;  Status DC


Info (PHARMACY MONITORING -- do not chart) 1 each PRN DAILY  PRN MC SEE 

COMMENTS;  Start 3/21/20 at 08:00;  Status UNV


Info (PHARMACY MONITORING -- do not chart) 1 each PRN DAILY  PRN MC SEE 

COMMENTS;  Start 3/21/20 at 08:00;  Stop 3/23/20 at 08:25;  Status DC


Sodium Chloride 90 meq/Potassium Chloride 15 meq/ Potassium Phosphate 10 mmol/ 

Magnesium Sulfate 10 meq/Calcium Gluconate 20 meq/ Multivitamins 10 ml/Chromium/

Copper/Manganese/ Seleni/Zn 0.5 ml/ Total Parenteral Nutrition/Amino 

Acids/Dextrose/ Fat Emulsion Intravenous 1,512 ml @  63 mls/hr TPN  CONT IV  

Last administered on 3/21/20at 20:57;  Start 3/21/20 at 22:00;  Stop 3/22/20 at 

21:59;  Status DC


Sodium Chloride 90 meq/Potassium Chloride 15 meq/ Potassium Phosphate 15 mmol/ 

Magnesium Sulfate 10 meq/Calcium Gluconate 20 meq/ Multivitamins 10 ml/Chromium/

Copper/Manganese/ Seleni/Zn 0.5 ml/ Total Parenteral Nutrition/Amino 

Acids/Dextrose/ Fat Emulsion Intravenous 1,512 ml @  63 mls/hr TPN  CONT IV ;  

Start 3/22/20 at 22:00;  Stop 3/22/20 at 14:16;  Status DC


Sodium Chloride 90 meq/Potassium Chloride 15 meq/ Potassium Phosphate 15 mmol/ 

Magnesium Sulfate 10 meq/Calcium Gluconate 20 meq/ Multivitamins 10 ml/Chromium/

Copper/Manganese/ Seleni/Zn 0.5 ml/ Total Parenteral Nutrition/Amino 

Acids/Dextrose/ Fat Emulsion Intravenous 1,200 ml @  50 mls/hr TPN  CONT IV ;  

Start 3/22/20 at 22:00;  Stop 3/22/20 at 14:17;  Status DC


Sodium Chloride 90 meq/Potassium Chloride 15 meq/ Potassium Phosphate 10 mmol/ 

Magnesium Sulfate 10 meq/Calcium Gluconate 20 meq/ Multivitamins 10 ml/Chromium/

Copper/Manganese/ Seleni/Zn 0.5 ml/ Total Parenteral Nutrition/Amino 

Acids/Dextrose/ Fat Emulsion Intravenous 1,200 ml @  50 mls/hr TPN  CONT IV  La

st administered on 3/22/20at 23:29;  Start 3/22/20 at 22:00;  Stop 3/23/20 at 

21:59;  Status DC


Sodium Chloride 1,000 ml @  1,000 mls/hr Q1H PRN IV hypotension;  Start 3/23/20 

at 07:28;  Stop 3/23/20 at 13:27;  Status DC


Albumin Human 200 ml @  200 mls/hr 1X  ONCE IV  Last administered on 3/23/20at 

08:51;  Start 3/23/20 at 07:30;  Stop 3/23/20 at 08:29;  Status DC


Diphenhydramine HCl (Benadryl) 25 mg 1X PRN  PRN IV ITCHING;  Start 3/23/20 at 

07:30;  Stop 3/24/20 at 07:29;  Status DC


Diphenhydramine HCl (Benadryl) 25 mg 1X PRN  PRN IV ITCHING;  Start 3/23/20 at 

07:30;  Stop 3/24/20 at 07:29;  Status DC


Sodium Chloride 1,000 ml @  400 mls/hr Q2H30M PRN IV PATENCY;  Start 3/23/20 at 

07:28;  Stop 3/23/20 at 19:27;  Status DC


Info (PHARMACY MONITORING -- do not chart) 1 each PRN DAILY  PRN MC SEE 

COMMENTS;  Start 3/23/20 at 07:30;  Stop 4/3/20 at 13:01;  Status DC


Metronidazole 100 ml @  100 mls/hr Q6HRS IV  Last administered on 4/8/20at 

06:26;  Start 3/23/20 at 08:30;  Stop 4/8/20 at 09:58;  Status DC


Micafungin Sodium 100 mg/Dextrose 100 ml @  100 mls/hr Q24H IV  Last 

administered on 4/11/20at 08:52;  Start 3/23/20 at 09:00


Propofol 0 ml @ As Directed STK-MED ONCE IV ;  Start 3/23/20 at 07:53;  Stop 

3/23/20 at 07:53;  Status DC


Etomidate (Amidate) 20 mg STK-MED ONCE IV ;  Start 3/23/20 at 07:53;  Stop 

3/23/20 at 07:54;  Status DC


Midazolam HCl (Versed) 5 mg STK-MED ONCE .ROUTE ;  Start 3/23/20 at 07:57;  Stop

3/23/20 at 07:57;  Status DC


Fentanyl Citrate 30 ml @ 0 mls/hr CONT  PRN IV SEE PROTOCOL Last administered on

4/11/20at 08:23;  Start 3/23/20 at 08:15


Artificial Tears (Artificial Tears) 1 drop PRN Q1HR  PRN OU DRY EYE, 1st choice;

 Start 3/23/20 at 08:15


Midazolam HCl 50 mg/Sodium Chloride 50 ml @ 0 mls/hr CONT  PRN IV SEE PROTOCOL 

Last administered on 3/26/20at 22:39;  Start 3/23/20 at 08:15;  Stop 3/28/20 at 

15:59;  Status DC


Etomidate (Amidate) 8 mg 1X  ONCE IV  Last administered on 3/23/20at 08:33;  

Start 3/23/20 at 08:30;  Stop 3/23/20 at 08:31;  Status DC


Succinylcholine Chloride (Anectine) 120 mg 1X  ONCE IV  Last administered on 

3/23/20at 08:34;  Start 3/23/20 at 08:30;  Stop 3/23/20 at 08:31;  Status DC


Midazolam HCl (Versed) 5 mg 1X  ONCE IV ;  Start 3/23/20 at 08:30;  Stop 3/23/20

at 08:31;  Status DC


Potassium Chloride 15 meq/ Bicarbonate Dialysis Soln w/ out KCl 5,007.5 ml  @ 

1,000 mls/ hr Q5H1M IV  Last administered on 3/24/20at 11:11;  Start 3/23/20 at 

12:00;  Stop 3/24/20 at 11:15;  Status DC


Potassium Chloride 15 meq/ Bicarbonate Dialysis Soln w/ out KCl 5,007.5 ml  @ 

1,000 mls/ hr Q5H1M IV  Last administered on 3/24/20at 11:12;  Start 3/23/20 at 

12:00;  Stop 3/24/20 at 11:17;  Status DC


Potassium Chloride 15 meq/ Bicarbonate Dialysis Soln w/ out KCl 5,007.5 ml  @ 

1,000 mls/ hr Q5H1M IV  Last administered on 3/24/20at 11:11;  Start 3/23/20 at 

12:00;  Stop 3/24/20 at 11:19;  Status DC


Sodium Chloride 90 meq/Potassium Chloride 15 meq/ Potassium Phosphate 10 mmol/ 

Magnesium Sulfate 10 meq/Calcium Gluconate 20 meq/ Multivitamins 10 ml/Chromium/

Copper/Manganese/ Seleni/Zn 0.5 ml/ Total Parenteral Nutrition/Amino 

Acids/Dextrose/ Fat Emulsion Intravenous 1,400 ml @  58.333 mls/ hr TPN  CONT IV

 Last administered on 3/23/20at 21:42;  Start 3/23/20 at 22:00;  Stop 3/24/20 at

21:59;  Status DC


Heparin Sodium (Porcine) (Heparin Sodium) 5,000 unit Q8HRS SQ  Last administered

on 3/28/20at 05:55;  Start 3/23/20 at 15:00;  Stop 3/28/20 at 13:28;  Status DC


Meropenem 500 mg/ Sodium Chloride 50 ml @  100 mls/hr Q6HRS IV  Last 

administered on 3/25/20at 06:00;  Start 3/24/20 at 09:00;  Stop 3/25/20 at 

07:29;  Status DC


Potassium Phosphate 20 mmol/ Sodium Chloride 106.6667 ml @  51.667 m... 1X  ONCE

IV  Last administered on 3/24/20at 11:22;  Start 3/24/20 at 10:15;  Stop 3/24/20

at 12:18;  Status DC


Acetaminophen (Tylenol Supp) 650 mg PRN Q6HRS  PRN WA MILD PAIN / TEMP Last 

administered on 4/9/20at 22:30;  Start 3/24/20 at 10:30


Potassium Chloride/Water 100 ml @  100 mls/hr Q1H IV  Last administered on 

3/24/20at 12:12;  Start 3/24/20 at 11:00;  Stop 3/24/20 at 12:59;  Status DC


Potassium Chloride 20 meq/ Bicarbonate Dialysis Soln w/ out KCl 5,010 ml @  

1,000 mls/hr Q5H1M IV  Last administered on 3/25/20at 08:48;  Start 3/24/20 at 

12:00;  Stop 3/25/20 at 13:03;  Status DC


Potassium Chloride 20 meq/ Bicarbonate Dialysis Soln w/ out KCl 5,010 ml @  

1,000 mls/hr Q5H1M IV  Last administered on 3/29/20at 14:52;  Start 3/24/20 at 

11:30;  Stop 3/29/20 at 19:59;  Status DC


Potassium Chloride 20 meq/ Bicarbonate Dialysis Soln w/ out KCl 5,010 ml @  

1,000 mls/hr Q5H1M IV  Last administered on 3/29/20at 14:53;  Start 3/24/20 at 

11:30;  Stop 3/29/20 at 19:59;  Status DC


Sodium Chloride 90 meq/Potassium Chloride 15 meq/ Potassium Phosphate 15 mmol/ 

Magnesium Sulfate 10 meq/Calcium Gluconate 15 meq/ Multivitamins 10 ml/Chromium/

Copper/Manganese/ Seleni/Zn 0.5 ml/ Total Parenteral Nutrition/Amino 

Acids/Dextrose/ Fat Emulsion Intravenous 1,400 ml @  58.333 mls/ hr TPN  CONT IV

 Last administered on 3/24/20at 22:17;  Start 3/24/20 at 22:00;  Stop 3/25/20 at

21:59;  Status DC


Cefepime HCl (Maxipime) 2 gm Q12HR IVP  Last administered on 4/7/20at 20:56;  

Start 3/25/20 at 09:00;  Stop 4/8/20 at 09:58;  Status DC


Daptomycin 500 mg/ Sodium Chloride 50 ml @  100 mls/hr Q48H IV  Last 

administered on 4/10/20at 09:57;  Start 3/25/20 at 08:30;  Stop 4/10/20 at 

10:07;  Status DC


Lidocaine HCl (Buffered Lidocaine 1%) 3 ml 1X  ONCE INJ  Last administered on 

3/25/20at 10:27;  Start 3/25/20 at 10:30;  Stop 3/25/20 at 10:31;  Status DC


Potassium Phosphate 20 mmol/ Sodium Chloride 106.6667 ml @  51.667 m... 1X  ONCE

IV  Last administered on 3/25/20at 12:51;  Start 3/25/20 at 13:00;  Stop 3/25/20

at 15:03;  Status DC


Sodium Chloride 90 meq/Potassium Chloride 15 meq/ Potassium Phosphate 18 mmol/ 

Magnesium Sulfate 8 meq/Calcium Gluconate 15 meq/ Multivitamins 10 ml/Chromium/ 

Copper/Manganese/ Seleni/Zn 0.5 ml/ Total Parenteral Nutrition/Amino 

Acids/Dextrose/ Fat Emulsion Intravenous 1,400 ml @  58.333 mls/ hr TPN  CONT IV

 Last administered on 3/25/20at 22:16;  Start 3/25/20 at 22:00;  Stop 3/26/20 at

21:59;  Status DC


Potassium Chloride 20 meq/ Bicarbonate Dialysis Soln w/ out KCl 5,010 ml @  

1,000 mls/hr Q5H1M IV  Last administered on 3/29/20at 14:54;  Start 3/25/20 at 

16:00;  Stop 3/29/20 at 19:59;  Status DC


Multi-Ingred Cream/Lotion/Oil/ Oint (Artificial Tears Eye Ointment) 1 thomas PRN 

Q1HR  PRN OU DRY EYE, 2nd choice Last administered on 4/3/20at 11:05;  Start 

3/25/20 at 17:30


Sodium Chloride 90 meq/Potassium Chloride 15 meq/ Potassium Phosphate 18 mmol/ 

Magnesium Sulfate 8 meq/Calcium Gluconate 15 meq/ Multivitamins 10 ml/Chromium/ 

Copper/Manganese/ Seleni/Zn 0.5 ml/ Total Parenteral Nutrition/Amino 

Acids/Dextrose/ Fat Emulsion Intravenous 1,400 ml @  58.333 mls/ hr TPN  CONT IV

 Last administered on 3/26/20at 22:00;  Start 3/26/20 at 22:00;  Stop 3/27/20 at

21:59;  Status DC


Albumin Human 500 ml @  125 mls/hr 1X  ONCE IV ;  Start 3/26/20 at 14:15;  Stop 

3/26/20 at 18:14;  Status DC


Sodium Chloride 90 meq/Potassium Chloride 15 meq/ Potassium Phosphate 18 mmol/ 

Magnesium Sulfate 8 meq/Calcium Gluconate 15 meq/ Multivitamins 10 ml/Chromium/ 

Copper/Manganese/ Seleni/Zn 0.5 ml/ Insulin Human Regular 10 unit/ Total 

Parenteral Nutrition/Amino Acids/Dextrose/ Fat Emulsion Intravenous 1,400 ml @  

58.333 mls/ hr TPN  CONT IV  Last administered on 3/27/20at 21:43;  Start 

3/27/20 at 22:00;  Stop 3/28/20 at 21:59;  Status DC


Lidocaine HCl (Buffered Lidocaine 1%) 3 ml STK-MED ONCE .ROUTE ;  Start 3/25/20 

at 10:00;  Stop 3/27/20 at 13:57;  Status DC


Midazolam HCl 100 mg/Sodium Chloride 100 ml @ 7 mls/hr CONT  PRN IV SEE PROTOCOL

Last administered on 4/8/20at 15:35;  Start 3/28/20 at 16:00


Sodium Chloride 90 meq/Potassium Chloride 15 meq/ Potassium Phosphate 18 mmol/ 

Magnesium Sulfate 8 meq/Calcium Gluconate 15 meq/ Multivitamins 10 ml/Chromium/ 

Copper/Manganese/ Seleni/Zn 0.5 ml/ Insulin Human Regular 15 unit/ Total 

Parenteral Nutrition/Amino Acids/Dextrose/ Fat Emulsion Intravenous 1,400 ml @  

58.333 mls/ hr TPN  CONT IV  Last administered on 3/28/20at 20:34;  Start 

3/28/20 at 22:00;  Stop 3/29/20 at 21:59;  Status DC


Info ([facility] Electrolyte Protocol) 1 ea CONT PRN  PRN MC PER PROTOCOL;  Start 

3/29/20 at 13:15


Sodium Chloride 90 meq/Potassium Chloride 15 meq/ Potassium Phosphate 18 mmol/ 

Magnesium Sulfate 8 meq/Calcium Gluconate 15 meq/ Multivitamins 10 ml/Chromium/ 

Copper/Manganese/ Seleni/Zn 0.5 ml/ Insulin Human Regular 15 unit/ Total 

Parenteral Nutrition/Amino Acids/Dextrose/ Fat Emulsion Intravenous 1,400 ml @  

58.333 mls/ hr TPN  CONT IV  Last administered on 3/29/20at 22:05;  Start 

3/29/20 at 22:00;  Stop 3/30/20 at 21:59;  Status DC


Potassium Chloride 15 meq/ Bicarbonate Dialysis Soln w/ out KCl 5,007.5 ml  @ 

1,000 mls/ hr Q5H1M IV  Last administered on 4/1/20at 18:14;  Start 3/29/20 at 

20:00;  Stop 4/2/20 at 13:08;  Status DC


Potassium Chloride 15 meq/ Bicarbonate Dialysis Soln w/ out KCl 5,007.5 ml  @ 

1,000 mls/ hr Q5H1M IV  Last administered on 4/1/20at 18:14;  Start 3/29/20 at 

20:00;  Stop 4/2/20 at 13:08;  Status DC


Potassium Chloride 15 meq/ Bicarbonate Dialysis Soln w/ out KCl 5,007.5 ml  @ 

1,000 mls/ hr Q5H1M IV  Last administered on 4/1/20at 18:14;  Start 3/29/20 at 

20:00;  Stop 4/2/20 at 13:08;  Status DC


Iohexol (Omnipaque 240 Mg/ml) 30 ml 1X  ONCE PO  Last administered on 3/30/20at 

11:30;  Start 3/30/20 at 11:30;  Stop 3/30/20 at 11:33;  Status DC


Info (CONTRAST GIVEN -- Rx MONITORING) 1 each PRN DAILY  PRN MC SEE COMMENTS;  

Start 3/30/20 at 11:45;  Stop 4/1/20 at 11:44;  Status DC


Sodium Chloride 90 meq/Potassium Chloride 15 meq/ Potassium Phosphate 18 mmol/ 

Magnesium Sulfate 8 meq/Calcium Gluconate 15 meq/ Multivitamins 10 ml/Chromium/ 

Copper/Manganese/ Seleni/Zn 0.5 ml/ Insulin Human Regular 15 unit/ Total 

Parenteral Nutrition/Amino Acids/Dextrose/ Fat Emulsion Intravenous 1,400 ml @  

58.333 mls/ hr TPN  CONT IV  Last administered on 3/30/20at 21:47;  Start 

3/30/20 at 22:00;  Stop 3/31/20 at 21:59;  Status DC


Sodium Chloride 90 meq/Potassium Chloride 15 meq/ Potassium Phosphate 18 mmol/ 

Magnesium Sulfate 8 meq/Calcium Gluconate 15 meq/ Multivitamins 10 ml/Chromium/ 

Copper/Manganese/ Seleni/Zn 0.5 ml/ Insulin Human Regular 20 unit/ Total 

Parenteral Nutrition/Amino Acids/Dextrose/ Fat Emulsion Intravenous 1,400 ml @  

58.333 mls/ hr TPN  CONT IV  Last administered on 3/31/20at 21:36;  Start 3/31/

20 at 22:00;  Stop 4/1/20 at 21:59;  Status DC


Alteplase, Recombinant (Cathflo For Central Catheter Clearance) 1 mg 1X  ONCE 

INT CAT  Last administered on 3/31/20at 20:03;  Start 3/31/20 at 19:30;  Stop 

3/31/20 at 19:46;  Status DC


Alteplase, Recombinant (Cathflo For Central Catheter Clearance) 1 mg 1X  ONCE 

INT CAT  Last administered on 3/31/20at 22:05;  Start 3/31/20 at 22:00;  Stop 

3/31/20 at 22:01;  Status DC


Sodium Chloride 90 meq/Potassium Chloride 15 meq/ Potassium Phosphate 18 mmol/ 

Magnesium Sulfate 8 meq/Calcium Gluconate 15 meq/ Multivitamins 10 ml/Chromium/ 

Copper/Manganese/ Seleni/Zn 0.5 ml/ Insulin Human Regular 20 unit/ Total 

Parenteral Nutrition/Amino Acids/Dextrose/ Fat Emulsion Intravenous 1,400 ml @  

58.333 mls/ hr TPN  CONT IV  Last administered on 4/1/20at 21:30;  Start 4/1/20 

at 22:00;  Stop 4/2/20 at 21:59;  Status DC


Dexmedetomidine HCl 400 mcg/ Sodium Chloride 100 ml @ 0 mls/hr CONT  PRN IV 

ANXIETY / AGITATION Last administered on 4/11/20at 06:09;  Start 4/2/20 at 08:15


Sodium Chloride 500 ml @  500 mls/hr 1X PRN  PRN IV ELEVATED BP, SEE COMMENTS;  

Start 4/2/20 at 08:15


Atropine Sulfate (ATROPINE 0.5mg SYRINGE) 0.5 mg PRN Q5MIN  PRN IV SEE COMMENTS;

 Start 4/2/20 at 08:15


Furosemide (Lasix) 20 mg 1X  ONCE IVP  Last administered on 4/2/20at 08:19;  

Start 4/2/20 at 08:15;  Stop 4/2/20 at 08:16;  Status DC


Lidocaine HCl (Buffered Lidocaine 1%) 3 ml STK-MED ONCE .ROUTE ;  Start 4/2/20 

at 08:39;  Stop 4/2/20 at 08:39;  Status DC


Lidocaine HCl (Buffered Lidocaine 1%) 6 ml 1X  ONCE INJ  Last administered on 

4/2/20at 09:05;  Start 4/2/20 at 09:00;  Stop 4/2/20 at 09:06;  Status DC


Sodium Chloride 90 meq/Potassium Chloride 15 meq/ Potassium Phosphate 18 mmol/ 

Magnesium Sulfate 8 meq/Calcium Gluconate 15 meq/ Multivitamins 10 ml/Chromium/ 

Copper/Manganese/ Seleni/Zn 0.5 ml/ Insulin Human Regular 20 unit/ Total 

Parenteral Nutrition/Amino Acids/Dextrose/ Fat Emulsion Intravenous 1,400 ml @  

58.333 mls/ hr TPN  CONT IV  Last administered on 4/2/20at 22:45;  Start 4/2/20 

at 22:00;  Stop 4/3/20 at 21:59;  Status DC


Sodium Chloride 1,000 ml @  1,000 mls/hr Q1H PRN IV hypotension;  Start 4/3/20 

at 07:30;  Stop 4/3/20 at 13:29;  Status DC


Albumin Human 200 ml @  200 mls/hr 1X PRN  PRN IV Hypotension Last administered 

on 4/3/20at 09:36;  Start 4/3/20 at 07:30;  Stop 4/3/20 at 13:29;  Status DC


Sodium Chloride (Normal Saline Flush) 10 ml 1X PRN  PRN IV AP catheter pack;  

Start 4/3/20 at 07:30;  Stop 4/3/20 at 21:29;  Status DC


Sodium Chloride (Normal Saline Flush) 10 ml 1X PRN  PRN IV  catheter pack;  

Start 4/3/20 at 07:30;  Stop 4/4/20 at 07:29;  Status DC


Sodium Chloride 1,000 ml @  400 mls/hr Q2H30M PRN IV PATENCY;  Start 4/3/20 at 

07:30;  Stop 4/3/20 at 19:29;  Status DC


Info (PHARMACY MONITORING -- do not chart) 1 each PRN DAILY  PRN MC SEE 

COMMENTS;  Start 4/3/20 at 07:30;  Stop 4/3/20 at 13:02;  Status DC


Info (PHARMACY MONITORING -- do not chart) 1 each PRN DAILY  PRN MC SEE C

OMMENTS;  Start 4/3/20 at 07:30;  Stop 4/5/20 at 12:45;  Status DC


Sodium Chloride 90 meq/Potassium Chloride 15 meq/ Potassium Phosphate 10 mmol/ 

Magnesium Sulfate 8 meq/Calcium Gluconate 15 meq/ Multivitamins 10 ml/Chromium/ 

Copper/Manganese/ Seleni/Zn 0.5 ml/ Insulin Human Regular 25 unit/ Total 

Parenteral Nutrition/Amino Acids/Dextrose/ Fat Emulsion Intravenous 1,400 ml @  

58.333 mls/ hr TPN  CONT IV  Last administered on 4/3/20at 22:19;  Start 4/3/20 

at 22:00;  Stop 4/4/20 at 21:59;  Status DC


Heparin Sodium (Porcine) (Heparin Sodium) 5,000 unit Q12HR SQ  Last administered

on 4/11/20at 08:31;  Start 4/3/20 at 21:00


Ondansetron HCl (Zofran) 4 mg PRN Q6HRS  PRN IV NAUSEA/VOMITING;  Start 4/6/20 

at 07:00;  Stop 4/7/20 at 06:59;  Status DC


Fentanyl Citrate (Fentanyl 2ml Vial) 25 mcg PRN Q5MIN  PRN IV MILD PAIN 1-3;  

Start 4/6/20 at 07:00;  Stop 4/7/20 at 06:59;  Status DC


Fentanyl Citrate (Fentanyl 2ml Vial) 50 mcg PRN Q5MIN  PRN IV MODERATE TO SEVERE

PAIN;  Start 4/6/20 at 07:00;  Stop 4/7/20 at 06:59;  Status DC


Ringer's Solution 1,000 ml @  30 mls/hr Q24H IV ;  Start 4/6/20 at 07:00;  Stop 

4/6/20 at 18:59;  Status DC


Lidocaine HCl (Xylocaine-Mpf 1% 2ml Vial) 2 ml PRN 1X  PRN ID PRIOR TO IV START;

 Start 4/6/20 at 07:00;  Stop 4/7/20 at 06:59;  Status DC


Prochlorperazine Edisylate (Compazine) 5 mg PACU PRN  PRN IV NAUSEA, MRX1;  

Start 4/6/20 at 07:00;  Stop 4/7/20 at 06:59;  Status DC


Sodium Chloride 1,000 ml @  1,000 mls/hr Q1H PRN IV hypotension;  Start 4/4/20 

at 09:10;  Stop 4/4/20 at 15:09;  Status DC


Albumin Human 200 ml @  200 mls/hr 1X PRN  PRN IV Hypotension Last administered 

on 4/4/20at 10:10;  Start 4/4/20 at 09:15;  Stop 4/4/20 at 15:14;  Status DC


Sodium Chloride 1,000 ml @  400 mls/hr Q2H30M PRN IV PATENCY;  Start 4/4/20 at 

09:10;  Stop 4/4/20 at 21:09;  Status DC


Info (PHARMACY MONITORING -- do not chart) 1 each PRN DAILY  PRN MC SEE 

COMMENTS;  Start 4/4/20 at 09:15;  Stop 4/5/20 at 12:45;  Status DC


Info (PHARMACY MONITORING -- do not chart) 1 each PRN DAILY  PRN MC SEE 

COMMENTS;  Start 4/4/20 at 09:15;  Stop 4/5/20 at 12:45;  Status DC


Sodium Chloride 90 meq/Potassium Chloride 15 meq/ Potassium Phosphate 10 mmol/ 

Magnesium Sulfate 8 meq/Calcium Gluconate 15 meq/ Multivitamins 10 ml/Chromium/ 

Copper/Manganese/ Seleni/Zn 0.5 ml/ Insulin Human Regular 25 unit/ Total 

Parenteral Nutrition/Amino Acids/Dextrose/ Fat Emulsion Intravenous 1,400 ml @  

58.333 mls/ hr TPN  CONT IV  Last administered on 4/4/20at 22:10;  Start 4/4/20 

at 22:00;  Stop 4/5/20 at 21:59;  Status DC


Magnesium Sulfate 50 ml @ 25 mls/hr PRN DAILY  PRN IV for Mag < 1.7 on am labs; 

Start 4/5/20 at 09:15


Sodium Chloride 90 meq/Potassium Chloride 15 meq/ Potassium Phosphate 10 mmol/ 

Magnesium Sulfate 8 meq/Calcium Gluconate 15 meq/ Multivitamins 10 ml/Chromium/ 

Copper/Manganese/ Seleni/Zn 0.5 ml/ Insulin Human Regular 25 unit/ Total 

Parenteral Nutrition/Amino Acids/Dextrose/ Fat Emulsion Intravenous 1,400 ml @  

58.333 mls/ hr TPN  CONT IV  Last administered on 4/5/20at 21:20;  Start 4/5/20 

at 22:00;  Stop 4/6/20 at 21:59;  Status DC


Sodium Chloride 1,000 ml @  1,000 mls/hr Q1H PRN IV hypotension;  Start 4/5/20 

at 12:23;  Stop 4/5/20 at 18:22;  Status DC


Albumin Human 200 ml @  200 mls/hr 1X  ONCE IV  Last administered on 4/5/20at 

13:34;  Start 4/5/20 at 12:30;  Stop 4/5/20 at 13:29;  Status DC


Diphenhydramine HCl (Benadryl) 25 mg 1X PRN  PRN IV ITCHING;  Start 4/5/20 at 

12:30;  Stop 4/6/20 at 12:29;  Status DC


Diphenhydramine HCl (Benadryl) 25 mg 1X PRN  PRN IV ITCHING;  Start 4/5/20 at 

12:30;  Stop 4/6/20 at 12:29;  Status DC


Info (PHARMACY MONITORING -- do not chart) 1 each PRN DAILY  PRN MC SEE 

COMMENTS;  Start 4/5/20 at 12:30;  Status Cancel


Bupivacaine HCl/ Epinephrine Bitart (Sensorcain-Epi 0.5%-1:121423 Mpf) 30 ml 

STK-MED ONCE .ROUTE  Last administered on 4/6/20at 11:44;  Start 4/6/20 at 

11:00;  Stop 4/6/20 at 11:01;  Status DC


Cellulose (Surgicel Fibrillar 1x2) 1 each STK-MED ONCE .ROUTE ;  Start 4/6/20 at

11:00;  Stop 4/6/20 at 11:01;  Status DC


Sodium Chloride 90 meq/Potassium Chloride 15 meq/ Potassium Phosphate 10 mmol/ 

Magnesium Sulfate 12 meq/Calcium Gluconate 15 meq/ Multivitamins 10 ml/Chromium/

Copper/Manganese/ Seleni/Zn 0.5 ml/ Insulin Human Regular 25 unit/ Total 

Parenteral Nutrition/Amino Acids/Dextrose/ Fat Emulsion Intravenous 1,400 ml @  

58.333 mls/ hr TPN  CONT IV  Last administered on 4/6/20at 22:24;  Start 4/6/20 

at 22:00;  Stop 4/7/20 at 21:59;  Status DC


Propofol 20 ml @ As Directed STK-MED ONCE IV ;  Start 4/6/20 at 11:07;  Stop 

4/6/20 at 11:07;  Status DC


Cellulose (Surgicel Hemostat 4x8) 1 each STK-MED ONCE .ROUTE  Last administered 

on 4/6/20at 11:44;  Start 4/6/20 at 11:55;  Stop 4/6/20 at 11:56;  Status DC


Sevoflurane (Ultane) 60 ml STK-MED ONCE IH ;  Start 4/6/20 at 12:46;  Stop 

4/6/20 at 12:46;  Status DC


Sodium Chloride 1,000 ml @  1,000 mls/hr Q1H PRN IV hypotension;  Start 4/6/20 

at 13:51;  Stop 4/6/20 at 19:50;  Status DC


Albumin Human 200 ml @  200 mls/hr 1X PRN  PRN IV Hypotension Last administered 

on 4/6/20at 14:51;  Start 4/6/20 at 14:00;  Stop 4/6/20 at 19:59;  Status DC


Diphenhydramine HCl (Benadryl) 25 mg 1X PRN  PRN IV ITCHING;  Start 4/6/20 at 

14:00;  Stop 4/7/20 at 13:59;  Status DC


Diphenhydramine HCl (Benadryl) 25 mg 1X PRN  PRN IV ITCHING;  Start 4/6/20 at 

14:00;  Stop 4/7/20 at 13:59;  Status DC


Sodium Chloride 1,000 ml @  400 mls/hr Q2H30M PRN IV PATENCY;  Start 4/6/20 at 

13:51;  Stop 4/7/20 at 01:50;  Status DC


Info (PHARMACY MONITORING -- do not chart) 1 each PRN DAILY  PRN MC SEE 

COMMENTS;  Start 4/6/20 at 14:00;  Stop 4/9/20 at 08:16;  Status DC


Heparin Sodium (Porcine) (Hep Lock Adult) 500 unit STK-MED ONCE IVP ;  Start 

4/7/20 at 09:29;  Stop 4/7/20 at 09:30;  Status DC


Sodium Chloride 1,000 ml @  1,000 mls/hr Q1H PRN IV hypotension;  Start 4/7/20 

at 10:43;  Stop 4/7/20 at 16:42;  Status DC


Sodium Chloride 1,000 ml @  400 mls/hr Q2H30M PRN IV PATENCY;  Start 4/7/20 at 

10:43;  Stop 4/7/20 at 22:42;  Status DC


Info (PHARMACY MONITORING -- do not chart) 1 each PRN DAILY  PRN MC SEE 

COMMENTS;  Start 4/7/20 at 10:45;  Status UNV


Info (PHARMACY MONITORING -- do not chart) 1 each PRN DAILY  PRN MC SEE 

COMMENTS;  Start 4/7/20 at 10:45;  Status UNV


Sodium Chloride 90 meq/Potassium Chloride 15 meq/ Magnesium Sulfate 12 meq/Calci

um Gluconate 15 meq/ Multivitamins 10 ml/Chromium/ Copper/Manganese/ Seleni/Zn 

0.5 ml/ Insulin Human Regular 25 unit/ Total Parenteral Nutrition/Amino 

Acids/Dextrose/ Fat Emulsion Intravenous 1,400 ml @  58.333 mls/ hr TPN  CONT IV

 Last administered on 4/7/20at 22:13;  Start 4/7/20 at 22:00;  Stop 4/8/20 at 

21:59;  Status DC


Sodium Chloride 1,000 ml @  1,000 mls/hr Q1H PRN IV hypotension;  Start 4/8/20 

at 07:50;  Stop 4/8/20 at 13:49;  Status DC


Albumin Human 200 ml @  200 mls/hr 1X  ONCE IV ;  Start 4/8/20 at 08:00;  Stop 

4/8/20 at 08:53;  Status DC


Diphenhydramine HCl (Benadryl) 25 mg 1X PRN  PRN IV ITCHING;  Start 4/8/20 at 

08:00;  Stop 4/9/20 at 07:59;  Status DC


Diphenhydramine HCl (Benadryl) 25 mg 1X PRN  PRN IV ITCHING;  Start 4/8/20 at 

08:00;  Stop 4/9/20 at 07:59;  Status DC


Info (PHARMACY MONITORING -- do not chart) 1 each PRN DAILY  PRN MC SEE 

COMMENTS;  Start 4/8/20 at 08:00;  Stop 4/9/20 at 08:16;  Status DC


Albumin Human 50 ml @ 50 mls/hr 1X  ONCE IV ;  Start 4/8/20 at 08:53;  Stop 

4/8/20 at 08:56;  Status DC


Albumin Human 200 ml @  50 mls/hr PRN 1X  PRN IV HYPOTENSION Last administered 

on 4/8/20at 09:09;  Start 4/8/20 at 09:00


Meropenem 500 mg/ Sodium Chloride 50 ml @  100 mls/hr Q12H IV  Last administered

on 4/10/20at 21:49;  Start 4/8/20 at 10:00


Sodium Chloride 90 meq/Magnesium Sulfate 12 meq/ Calcium Gluconate 15 meq/ 

Multivitamins 10 ml/Chromium/ Copper/Manganese/ Seleni/Zn 0.5 ml/ Insulin Human 

Regular 25 unit/ Total Parenteral Nutrition/Amino Acids/Dextrose/ Fat Emulsion 

Intravenous 1,400 ml @  58.333 mls/ hr TPN  CONT IV  Last administered on 

4/8/20at 21:41;  Start 4/8/20 at 22:00;  Stop 4/9/20 at 21:59;  Status DC


Sodium Chloride 1,000 ml @  1,000 mls/hr Q1H PRN IV hypotension;  Start 4/9/20 

at 07:58;  Stop 4/9/20 at 13:57;  Status DC


Albumin Human 200 ml @  200 mls/hr 1X PRN  PRN IV Hypotension Last administered 

on 4/9/20at 09:30;  Start 4/9/20 at 08:00;  Stop 4/9/20 at 13:59;  Status DC


Sodium Chloride 1,000 ml @  400 mls/hr Q2H30M PRN IV PATENCY;  Start 4/9/20 at 

07:58;  Stop 4/9/20 at 19:57;  Status DC


Info (PHARMACY MONITORING -- do not chart) 1 each PRN DAILY  PRN MC SEE 

COMMENTS;  Start 4/9/20 at 08:00


Info (PHARMACY MONITORING -- do not chart) 1 each PRN DAILY  PRN MC SEE 

COMMENTS;  Start 4/9/20 at 08:15;  Status UNV


Sodium Chloride 90 meq/Potassium Phosphate 5 mmol/ Magnesium Sulfate 12 

meq/Calcium Gluconate 15 meq/ Multivitamins 10 ml/Chromium/ Copper/Manganese/ 

Seleni/Zn 0.5 ml/ Insulin Human Regular 30 unit/ Total Parenteral 

Nutrition/Amino Acids/Dextrose/ Fat Emulsion Intravenous 1,400 ml @  58.333 mls/

hr TPN  CONT IV  Last administered on 4/9/20at 22:08;  Start 4/9/20 at 22:00;  

Stop 4/10/20 at 21:59;  Status DC


Linezolid/Dextrose 300 ml @  300 mls/hr Q12HR IV  Last administered on 4/11/20at

09:29;  Start 4/10/20 at 11:00


Sodium Chloride 90 meq/Potassium Phosphate 15 mmol/ Magnesium Sulfate 12 

meq/Calcium Gluconate 15 meq/ Multivitamins 10 ml/Chromium/ Copper/Manganese/ 

Seleni/Zn 0.5 ml/ Insulin Human Regular 30 unit/ Total Parenteral 

Nutrition/Amino Acids/Dextrose/ Fat Emulsion Intravenous 1,400 ml @  58.333 mls/

hr TPN  CONT IV  Last administered on 4/10/20at 21:49;  Start 4/10/20 at 22:00; 

Stop 4/11/20 at 21:59





Active Scripts


Active


Reported


Bisoprolol Fumarate 5 Mg Tablet 10 Mg PO DAILY


Vitals/I & O





Vital Sign - Last 24 Hours








 4/10/20 4/10/20 4/10/20 4/10/20





 12:00 12:00 12:46 13:00


 


Temp 98.2   





 98.2   


 


Pulse 100  99 97


 


Resp 18  22 28


 


B/P (MAP) 106/59 (75)  106/52 (70) 118/52 (74)


 


Pulse Ox 99  100 98


 


O2 Delivery Ventilator Mechanical Ventilator Ventilator Ventilator


 


    





    





 4/10/20 4/10/20 4/10/20 4/10/20





 13:30 14:00 14:19 14:39


 


Temp    98.6





    98.6


 


Pulse 98 100  112


 


Resp 24 20  23


 


B/P (MAP) 129/54 (79) 122/55 (77)  113/57 (75)


 


Pulse Ox 99 99 98 100


 


O2 Delivery Ventilator Ventilator Ventilator Ventilator


 


    





    





 4/10/20 4/10/20 4/10/20 4/10/20





 15:59 16:00 16:27 17:00


 


Pulse 96   108


 


Resp 19   23


 


B/P (MAP) 98/47 (64)   116/81 (93)


 


Pulse Ox 99  99 100


 


O2 Delivery Ventilator Mechanical Ventilator Ventilator Ventilator





 4/10/20 4/10/20 4/10/20 4/10/20





 18:00 19:00 19:35 20:00


 


Pulse 127 118  


 


Resp 23 18  


 


B/P (MAP) 107/55 (72) 94/51 (65)  


 


Pulse Ox 97 98 95 


 


O2 Delivery Ventilator Ventilator Ventilator Mechanical Ventilator





 4/10/20 4/10/20 4/10/20 4/10/20





 20:00 21:00 22:00 23:00


 


Temp 98.8   





 98.8   


 


Pulse 115 99 93 91


 


Resp 18 18 18 18


 


B/P (MAP) 109/59 (76) 89/44 (59) 82/43 (56) 96/47 (63)


 


Pulse Ox 97 97 98 100


 


O2 Delivery Ventilator Ventilator Ventilator Ventilator


 


    





    





 4/10/20 4/11/20 4/11/20 4/11/20





 23:31 00:00 00:00 01:00


 


Temp   97.8 





   97.8 


 


Pulse   103 96


 


Resp   18 18


 


B/P (MAP)   100/49 (66) 106/54 (71)


 


Pulse Ox 97  100 100


 


O2 Delivery Ventilator Mechanical Ventilator Ventilator Ventilator


 


    





    





 4/11/20 4/11/20 4/11/20 4/11/20





 02:00 03:00 03:50 04:00


 


Temp    97.6





    97.6


 


Pulse 88 95  89


 


Resp 18 18  18


 


B/P (MAP) 99/52 (68) 115/57 (76)  117/46 (69)


 


Pulse Ox 99 97 100 97


 


O2 Delivery Ventilator Ventilator Ventilator Ventilator


 


    





    





 4/11/20 4/11/20 4/11/20 4/11/20





 04:00 05:00 06:00 08:02


 


Pulse  89 95 


 


Resp  18 18 


 


B/P (MAP)  97/50 (66) 127/74 (91) 


 


Pulse Ox  98 98 98


 


O2 Delivery Mechanical Ventilator Ventilator Ventilator Ventilator





 4/11/20 4/11/20  





 08:23 08:53  


 


Resp  20  


 


Pulse Ox 98 98  


 


O2 Delivery  Ventilator  


 


O2 Flow Rate 6.0   














Intake and Output   


 


 4/10/20 4/10/20 4/11/20





 15:00 23:00 07:00


 


Intake Total 400 ml 273.58 ml 1370.8 ml


 


Output Total 140 ml 115 ml 220 ml


 


Balance 260 ml 158.58 ml 1150.8 ml











Hemodynamically unstable?:  No


Is patient in severe pain?:  No


Is NPO status required?:  Yes











CHEY TURNER MD              Apr 11, 2020 11:09

## 2020-04-11 NOTE — PDOC
Infectious Disease Note


Subjective


Subjective


Sedated


Trach, vent FiO2 35 % PEEP 5


Hypotensive, on levaphed 


No fevers last 24 hrs


+ diarrhea 


TPN





ROS


ROS


unobtainable





Vital Sign


Vital Signs





Vital Signs








  Date Time  Temp Pulse Resp B/P (MAP) Pulse Ox O2 Delivery O2 Flow Rate FiO2


 


4/11/20 08:23     98  6.0 


 


4/11/20 08:02      Ventilator  


 


4/11/20 06:00  95 18 127/74 (91)    


 


4/11/20 04:00 97.6       





 97.6       











Physical Exam


PHYSICAL EXAM


GENERAL: Sedated, generalized anasarca 


HEENT: Pupils equal, + NGT, green bile appearing secretions in mouth 


NECK:  Trach/vent 


LUNGS: Diminished aeration bases 


HEART:  S1, S2, regular 


ABDOMEN:  Distended, hypoactive BS, Rectal tube in place 


: [place]   


EXTREMITIES: Generalized edema, no cyanosis, SCDs bilaterally 


DERMATOLOGIC:  Warm and dry.  No generalized rash.  


CENTRAL NERVOUS SYSTEM: Opens eyes to tactile stimuli


HDC, LIJ and RUE-PICC without signs of complications





Labs


Lab





Laboratory Tests








Test


 4/10/20


12:29 4/10/20


18:12 4/11/20


00:26 4/11/20


06:10


 


Glucose (Fingerstick)


 247 mg/dL


(70-99) 270 mg/dL


(70-99) 272 mg/dL


(70-99) 





 


Sodium Level


 


 


 


 135 mmol/L


(136-145)


 


Potassium Level


 


 


 


 3.6 mmol/L


(3.5-5.1)


 


Chloride Level


 


 


 


 98 mmol/L


()


 


Carbon Dioxide Level


 


 


 


 25 mmol/L


(21-32)


 


Anion Gap    12 (6-14) 


 


Blood Urea Nitrogen


 


 


 


 72 mg/dL


(7-20)


 


Creatinine


 


 


 


 2.5 mg/dL


(0.6-1.0)


 


Estimated GFR


(Cockcroft-Gault) 


 


 


 20.5 





 


Glucose Level


 


 


 


 268 mg/dL


(70-99)


 


Calcium Level


 


 


 


 8.9 mg/dL


(8.5-10.1)


 


Phosphorus Level


 


 


 


 3.4 mg/dL


(2.6-4.7)


 


Albumin


 


 


 


 2.6 g/dL


(3.4-5.0)


 


Test


 4/11/20


06:11 


 


 





 


Glucose (Fingerstick)


 261 mg/dL


(70-99) 


 


 











Micro


CT A/P, 4/9


IMPRESSION:


1. Increased ascites.


2. 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


at the pancreas


3. Cholelithiasis with thickening of the gallbladder wall.


4. Persistent pleural effusions and atelectasis in the lung bases.





Objective


Assessment


Leukocytosis -trending up 


Fever.


Severe acute gallstone pancreatitis with necrosis


   -CT 4/9 necrotizing pancreatitis with fluid and phlegmon at the pancreas


   -not a surgical candidate at this time


Hypotension on levaphed 


JED requiring HD 


   - s/p RIJ temporary dialysis catheter replacement, 4/2


Vent dependent respiratory failure


   -s/p Trach placement 


   -lung opacities, COVID-19 neg 


Anasarca - worse


Anemia - S/p PRBC 3/26


Hypocalcemia 


Prediabetes


HTN


Diarrhea, C. diff neg 3/23





Plan


Plan of Care


cont merrem (4/8) and Zyvox (4/10) and micafungin 


previously on cefepime, flagyl & dapto 


Gen surgery following


f/u cultures from 4/9 still pending 


Maintain aspiration precautions 





Critically ill








D/W RN








Patient seen and examined. Chart reviewed in detail. Case discussed with NP. LEANDRO villegas with above plan.











HAVEN WINTERS        Apr 11, 2020 09:32


BISMARK HERNANDEZ MD             Apr 11, 2020 18:42

## 2020-04-11 NOTE — PDOC
Renal-Progress Notes


Subjective Notes


Notes


REMAINS INTUBATED





History of Present Illness


Hx of present illness


STABLE





Vitals


Vitals





Vital Signs








  Date Time  Temp Pulse Resp B/P (MAP) Pulse Ox O2 Delivery O2 Flow Rate FiO2


 


4/11/20 11:28     98 Ventilator  


 


4/11/20 08:53   20     


 


4/11/20 08:23       6.0 


 


4/11/20 06:00  95  127/74 (91)    


 


4/11/20 04:00 97.6       





 97.6       








Weight


Weight [ ]





I.O.


Intake and Output











Intake and Output 


 


 4/11/20





 07:00


 


Intake Total 2044.38 ml


 


Output Total 475 ml


 


Balance 1569.38 ml


 


 


 


Intake Oral 0 ml


 


IV Total 2044.38 ml


 


Output Urine Total 475 ml











Labs


Labs





Laboratory Tests








Test


 4/10/20


12:29 4/10/20


18:12 4/11/20


00:26 4/11/20


06:10


 


Glucose (Fingerstick)


 247 mg/dL


(70-99) 270 mg/dL


(70-99) 272 mg/dL


(70-99) 





 


Sodium Level


 


 


 


 135 mmol/L


(136-145)


 


Potassium Level


 


 


 


 3.6 mmol/L


(3.5-5.1)


 


Chloride Level


 


 


 


 98 mmol/L


()


 


Carbon Dioxide Level


 


 


 


 25 mmol/L


(21-32)


 


Anion Gap    12 (6-14) 


 


Blood Urea Nitrogen


 


 


 


 72 mg/dL


(7-20)


 


Creatinine


 


 


 


 2.5 mg/dL


(0.6-1.0)


 


Estimated GFR


(Cockcroft-Gault) 


 


 


 20.5 





 


Glucose Level


 


 


 


 268 mg/dL


(70-99)


 


Calcium Level


 


 


 


 8.9 mg/dL


(8.5-10.1)


 


Phosphorus Level


 


 


 


 3.4 mg/dL


(2.6-4.7)


 


Albumin


 


 


 


 2.6 g/dL


(3.4-5.0)


 


Test


 4/11/20


06:11 4/11/20


08:00 


 





 


Glucose (Fingerstick)


 261 mg/dL


(70-99) 


 


 





 


O2 Saturation  94 % (92-99)   


 


Arterial Blood pH


 


 7.38


(7.35-7.45) 


 





 


Arterial Blood pCO2 at


Patient Temp 


 36 mmHg


(35-46) 


 





 


Arterial Blood pO2 at Patient


Temp 


 75 mmHg


() 


 





 


Arterial Blood HCO3


 


 21 mmol/L


(21-28) 


 





 


Arterial Blood Base Excess


 


 -4 mmol/L


(-3-3) 


 





 


FiO2  35   











Micro


Micro





Microbiology


4/5/20 Blood Culture - Final, Complete


         NO GROWTH AFTER 5 DAYS


4/4/20 Urine Culture - Final, Complete


         


4/4/20 Urine Culture Result 1 ([facility]) - Final, Complete





Review of Systems


Constitutional:  yes: other (ON THE VENT)





Physical Exam


General Appearance:  other (ON THE VENT)


Skin:  warm


Respiratory:  decreased breath sounds


Heart:  S1S2


Abdomen:  soft, bowel sounds present


Genitourinary:  bladder flat


Extremities:  pulses present, edema


Neurology:  other (SEDATED)





Assessment


Assessment


IMP





[id number] CRRT


ANEMIA


LEUCOCYTOSIS-IMPROVING


ANASARCA DUE TO 3RD SPACING


HYPERKALEMIA-RESOLVED


ACIDOSIS AND ACIDEMIA-CONTROLLED


ACUTE REPS FAILURE


ACUTE PANCREATITIS


HYPOALBUMINEMIA


HYPOCALCEMIA-FROM SAPONIFICATION-BETTER


POOR HD CATHETER FUNCTION





PLAN





TPN TO CONTINUE AS NEEDED


PRBC AS NEEDED


TREAT LOW CA ONLY FOR ARRHYTHMIA OR SYMPTOMS


CONT YOLI


ATTEMPT TO FORCE DIURESE


IHD TODAY 


ATTEMPT UF OF 3.5-4.0 LITERS


VENT SUPPORT


PRESSORS AS NEEDED


ANTIBIOTICS


WILL FOLLOW


VERY POOR PROGNOSIS











BETH HEIN MD                 Apr 11, 2020 12:22

## 2020-04-12 VITALS — DIASTOLIC BLOOD PRESSURE: 78 MMHG | SYSTOLIC BLOOD PRESSURE: 132 MMHG

## 2020-04-12 VITALS — SYSTOLIC BLOOD PRESSURE: 115 MMHG | DIASTOLIC BLOOD PRESSURE: 70 MMHG

## 2020-04-12 VITALS — SYSTOLIC BLOOD PRESSURE: 121 MMHG | DIASTOLIC BLOOD PRESSURE: 67 MMHG

## 2020-04-12 VITALS — DIASTOLIC BLOOD PRESSURE: 55 MMHG | SYSTOLIC BLOOD PRESSURE: 90 MMHG

## 2020-04-12 VITALS — SYSTOLIC BLOOD PRESSURE: 105 MMHG | DIASTOLIC BLOOD PRESSURE: 53 MMHG

## 2020-04-12 VITALS — SYSTOLIC BLOOD PRESSURE: 118 MMHG | DIASTOLIC BLOOD PRESSURE: 68 MMHG

## 2020-04-12 VITALS — DIASTOLIC BLOOD PRESSURE: 50 MMHG | SYSTOLIC BLOOD PRESSURE: 97 MMHG

## 2020-04-12 VITALS — DIASTOLIC BLOOD PRESSURE: 61 MMHG | SYSTOLIC BLOOD PRESSURE: 108 MMHG

## 2020-04-12 VITALS — SYSTOLIC BLOOD PRESSURE: 133 MMHG | DIASTOLIC BLOOD PRESSURE: 80 MMHG

## 2020-04-12 VITALS — DIASTOLIC BLOOD PRESSURE: 51 MMHG | SYSTOLIC BLOOD PRESSURE: 98 MMHG

## 2020-04-12 VITALS — SYSTOLIC BLOOD PRESSURE: 108 MMHG | DIASTOLIC BLOOD PRESSURE: 56 MMHG

## 2020-04-12 VITALS — SYSTOLIC BLOOD PRESSURE: 131 MMHG | DIASTOLIC BLOOD PRESSURE: 69 MMHG

## 2020-04-12 VITALS — DIASTOLIC BLOOD PRESSURE: 60 MMHG | SYSTOLIC BLOOD PRESSURE: 111 MMHG

## 2020-04-12 VITALS — DIASTOLIC BLOOD PRESSURE: 81 MMHG | SYSTOLIC BLOOD PRESSURE: 129 MMHG

## 2020-04-12 VITALS — DIASTOLIC BLOOD PRESSURE: 55 MMHG | SYSTOLIC BLOOD PRESSURE: 110 MMHG

## 2020-04-12 VITALS — SYSTOLIC BLOOD PRESSURE: 130 MMHG | DIASTOLIC BLOOD PRESSURE: 68 MMHG

## 2020-04-12 VITALS — SYSTOLIC BLOOD PRESSURE: 110 MMHG | DIASTOLIC BLOOD PRESSURE: 62 MMHG

## 2020-04-12 VITALS — DIASTOLIC BLOOD PRESSURE: 54 MMHG | SYSTOLIC BLOOD PRESSURE: 96 MMHG

## 2020-04-12 VITALS — DIASTOLIC BLOOD PRESSURE: 64 MMHG | SYSTOLIC BLOOD PRESSURE: 116 MMHG

## 2020-04-12 VITALS — SYSTOLIC BLOOD PRESSURE: 142 MMHG | DIASTOLIC BLOOD PRESSURE: 73 MMHG

## 2020-04-12 VITALS — SYSTOLIC BLOOD PRESSURE: 108 MMHG | DIASTOLIC BLOOD PRESSURE: 57 MMHG

## 2020-04-12 VITALS — DIASTOLIC BLOOD PRESSURE: 62 MMHG | SYSTOLIC BLOOD PRESSURE: 128 MMHG

## 2020-04-12 VITALS — DIASTOLIC BLOOD PRESSURE: 74 MMHG | SYSTOLIC BLOOD PRESSURE: 113 MMHG

## 2020-04-12 VITALS — SYSTOLIC BLOOD PRESSURE: 95 MMHG | DIASTOLIC BLOOD PRESSURE: 55 MMHG

## 2020-04-12 LAB
ALBUMIN SERPL-MCNC: 2.9 G/DL (ref 3.4–5)
AMORPH SED URNS QL MICRO: PRESENT /HPF
ANION GAP SERPL CALC-SCNC: 7 MMOL/L (ref 6–14)
APTT PPP: (no result) S
BACTERIA #/AREA URNS HPF: (no result) /HPF
BASE EXCESS ABG: 1 MMOL/L (ref -3–3)
BASOPHILS # BLD AUTO: 0 X10^3/UL (ref 0–0.2)
BASOPHILS NFR BLD: 1 % (ref 0–3)
BILIRUB UR QL STRIP: (no result)
BUN SERPL-MCNC: 56 MG/DL (ref 7–20)
CALCIUM SERPL-MCNC: 8.9 MG/DL (ref 8.5–10.1)
CHLORIDE SERPL-SCNC: 99 MMOL/L (ref 98–107)
CO2 SERPL-SCNC: 29 MMOL/L (ref 21–32)
CREAT SERPL-MCNC: 1.9 MG/DL (ref 0.6–1)
EOSINOPHIL NFR BLD: 0.3 X10^3/UL (ref 0–0.7)
EOSINOPHIL NFR BLD: 4 % (ref 0–3)
ERYTHROCYTE [DISTWIDTH] IN BLOOD BY AUTOMATED COUNT: 22.2 % (ref 11.5–14.5)
FIBRINOGEN PPP-MCNC: (no result) MG/DL
GFR SERPLBLD BASED ON 1.73 SQ M-ARVRAT: 28.1 ML/MIN
GLUCOSE SERPL-MCNC: 174 MG/DL (ref 70–99)
HCO3 BLDA-SCNC: 24 MMOL/L (ref 21–28)
HCT VFR BLD CALC: 21 % (ref 36–47)
HGB BLD-MCNC: 6.8 G/DL (ref 12–15.5)
HYALINE CASTS #/AREA URNS LPF: (no result) /HPF
INSPIRATION SETTING TIME VENT: 35
LYMPHOCYTES # BLD: 0.6 X10^3/UL (ref 1–4.8)
LYMPHOCYTES NFR BLD AUTO: 10 % (ref 24–48)
MCH RBC QN AUTO: 33 PG (ref 25–35)
MCHC RBC AUTO-ENTMCNC: 33 G/DL (ref 31–37)
MCV RBC AUTO: 100 FL (ref 79–100)
MONO #: 0.7 X10^3/UL (ref 0–1.1)
MONOCYTES NFR BLD: 11 % (ref 0–9)
NEUT #: 4.9 X10^3/UL (ref 1.8–7.7)
NEUTROPHILS NFR BLD AUTO: 74 % (ref 31–73)
NITRITE UR QL STRIP: POSITIVE
PCO2 BLDA: 34 MMHG (ref 35–46)
PH UR STRIP: 5 [PH]
PHOSPHATE SERPL-MCNC: 2.9 MG/DL (ref 2.6–4.7)
PLATELET # BLD AUTO: 287 X10^3/UL (ref 140–400)
PO2 BLDA: 95 MMHG (ref 75–108)
POTASSIUM SERPL-SCNC: 3.6 MMOL/L (ref 3.5–5.1)
PROT UR STRIP-MCNC: 100 MG/DL
RBC # BLD AUTO: 2.09 X10^6/UL (ref 3.5–5.4)
RBC #/AREA URNS HPF: >40 /HPF (ref 0–2)
SAO2 % BLDA: 97 % (ref 92–99)
SODIUM SERPL-SCNC: 135 MMOL/L (ref 136–145)
SQUAMOUS #/AREA URNS LPF: (no result) /LPF
UROBILINOGEN UR-MCNC: 1 MG/DL
WBC # BLD AUTO: 6.6 X10^3/UL (ref 4–11)
WBC #/AREA URNS HPF: (no result) /HPF (ref 0–4)

## 2020-04-12 RX ADMIN — INSULIN LISPRO SCH UNITS: 100 INJECTION, SOLUTION INTRAVENOUS; SUBCUTANEOUS at 06:03

## 2020-04-12 RX ADMIN — INSULIN LISPRO SCH UNITS: 100 INJECTION, SOLUTION INTRAVENOUS; SUBCUTANEOUS at 13:01

## 2020-04-12 RX ADMIN — HEPARIN SODIUM SCH UNIT: 5000 INJECTION, SOLUTION INTRAVENOUS; SUBCUTANEOUS at 21:54

## 2020-04-12 RX ADMIN — PANTOPRAZOLE SODIUM SCH MG: 40 INJECTION, POWDER, FOR SOLUTION INTRAVENOUS at 09:55

## 2020-04-12 RX ADMIN — MEROPENEM SCH MLS/HR: 500 INJECTION, POWDER, FOR SOLUTION INTRAVENOUS at 10:26

## 2020-04-12 RX ADMIN — DEXMEDETOMIDINE HYDROCHLORIDE PRN MLS/HR: 100 INJECTION, SOLUTION, CONCENTRATE INTRAVENOUS at 05:56

## 2020-04-12 RX ADMIN — DEXMEDETOMIDINE HYDROCHLORIDE PRN MLS/HR: 100 INJECTION, SOLUTION, CONCENTRATE INTRAVENOUS at 21:48

## 2020-04-12 RX ADMIN — ACETAMINOPHEN PRN MG: 650 SUPPOSITORY RECTAL at 17:13

## 2020-04-12 RX ADMIN — DEXMEDETOMIDINE HYDROCHLORIDE PRN MLS/HR: 100 INJECTION, SOLUTION, CONCENTRATE INTRAVENOUS at 14:36

## 2020-04-12 RX ADMIN — INSULIN LISPRO SCH UNITS: 100 INJECTION, SOLUTION INTRAVENOUS; SUBCUTANEOUS at 17:59

## 2020-04-12 RX ADMIN — ONDANSETRON PRN MG: 2 INJECTION INTRAMUSCULAR; INTRAVENOUS at 09:56

## 2020-04-12 RX ADMIN — HEPARIN SODIUM SCH UNIT: 5000 INJECTION, SOLUTION INTRAVENOUS; SUBCUTANEOUS at 09:58

## 2020-04-12 RX ADMIN — DEXTROSE SCH MLS/HR: 50 INJECTION, SOLUTION INTRAVENOUS at 10:26

## 2020-04-12 RX ADMIN — INSULIN LISPRO SCH UNITS: 100 INJECTION, SOLUTION INTRAVENOUS; SUBCUTANEOUS at 00:13

## 2020-04-12 RX ADMIN — Medication PRN EACH: at 11:14

## 2020-04-12 RX ADMIN — MEROPENEM SCH MLS/HR: 500 INJECTION, POWDER, FOR SOLUTION INTRAVENOUS at 21:48

## 2020-04-12 NOTE — PDOC
PULMONARY PROGRESS NOTES


Subjective


Patient intubated on 3/23 , sedated


remained on assist control overnight, placed on Pressure Support 15/5 35% this 

am, tolerating well


nursing reports anemia and fever


Vitals





Vital Signs








  Date Time  Temp Pulse Resp B/P (MAP) Pulse Ox O2 Delivery O2 Flow Rate FiO2


 


4/12/20 08:32     99 Ventilator  


 


4/12/20 06:48   18     


 


4/12/20 06:00  113  98/51 (67)    


 


4/12/20 04:00 100.0       





 100.0       


 


4/11/20 08:23       6.0 








Comments


Trach/opens eyes


Lungs:  Other (dimished in BLL)


Cardiovascular:  S1, S2


Abdomen:  Non-tender, Other (distended)


Extremities:  Other (+3 generalized edema )


Skin:  Warm, Dry


Labs





Laboratory Tests








Test


 4/10/20


12:29 4/10/20


18:12 4/11/20


00:26 4/11/20


06:10


 


Glucose (Fingerstick)


 247 mg/dL


(70-99) 270 mg/dL


(70-99) 272 mg/dL


(70-99) 





 


Sodium Level


 


 


 


 135 mmol/L


(136-145)


 


Potassium Level


 


 


 


 3.6 mmol/L


(3.5-5.1)


 


Chloride Level


 


 


 


 98 mmol/L


()


 


Carbon Dioxide Level


 


 


 


 25 mmol/L


(21-32)


 


Anion Gap    12 (6-14) 


 


Blood Urea Nitrogen


 


 


 


 72 mg/dL


(7-20)


 


Creatinine


 


 


 


 2.5 mg/dL


(0.6-1.0)


 


Estimated GFR


(Cockcroft-Gault) 


 


 


 20.5 





 


Glucose Level


 


 


 


 268 mg/dL


(70-99)


 


Calcium Level


 


 


 


 8.9 mg/dL


(8.5-10.1)


 


Phosphorus Level


 


 


 


 3.4 mg/dL


(2.6-4.7)


 


Albumin


 


 


 


 2.6 g/dL


(3.4-5.0)


 


Test


 4/11/20


06:11 4/11/20


08:00 4/11/20


15:33 4/12/20


00:08


 


Glucose (Fingerstick)


 261 mg/dL


(70-99) 


 214 mg/dL


(70-99) 208 mg/dL


(70-99)


 


O2 Saturation  94 % (92-99)   


 


Arterial Blood pH


 


 7.38


(7.35-7.45) 


 





 


Arterial Blood pCO2 at


Patient Temp 


 36 mmHg


(35-46) 


 





 


Arterial Blood pO2 at Patient


Temp 


 75 mmHg


() 


 





 


Arterial Blood HCO3


 


 21 mmol/L


(21-28) 


 





 


Arterial Blood Base Excess


 


 -4 mmol/L


(-3-3) 


 





 


FiO2  35   


 


Test


 4/12/20


06:00 4/12/20


08:00 


 





 


White Blood Count


 6.6 x10^3/uL


(4.0-11.0) 


 


 





 


Red Blood Count


 2.09 x10^6/uL


(3.50-5.40) 


 


 





 


Hemoglobin


 6.8 g/dL


(12.0-15.5) 


 


 





 


Hematocrit


 21.0 %


(36.0-47.0) 


 


 





 


Mean Corpuscular Volume


 100 fL


() 


 


 





 


Mean Corpuscular Hemoglobin 33 pg (25-35)    


 


Mean Corpuscular Hemoglobin


Concent 33 g/dL


(31-37) 


 


 





 


Red Cell Distribution Width


 22.2 %


(11.5-14.5) 


 


 





 


Platelet Count


 287 x10^3/uL


(140-400) 


 


 





 


Neutrophils (%) (Auto) 74 % (31-73)    


 


Lymphocytes (%) (Auto) 10 % (24-48)    


 


Monocytes (%) (Auto) 11 % (0-9)    


 


Eosinophils (%) (Auto) 4 % (0-3)    


 


Basophils (%) (Auto) 1 % (0-3)    


 


Neutrophils # (Auto)


 4.9 x10^3/uL


(1.8-7.7) 


 


 





 


Lymphocytes # (Auto)


 0.6 x10^3/uL


(1.0-4.8) 


 


 





 


Monocytes # (Auto)


 0.7 x10^3/uL


(0.0-1.1) 


 


 





 


Eosinophils # (Auto)


 0.3 x10^3/uL


(0.0-0.7) 


 


 





 


Basophils # (Auto)


 0.0 x10^3/uL


(0.0-0.2) 


 


 





 


Sodium Level


 135 mmol/L


(136-145) 


 


 





 


Potassium Level


 3.6 mmol/L


(3.5-5.1) 


 


 





 


Chloride Level


 99 mmol/L


() 


 


 





 


Carbon Dioxide Level


 29 mmol/L


(21-32) 


 


 





 


Anion Gap 7 (6-14)    


 


Blood Urea Nitrogen


 56 mg/dL


(7-20) 


 


 





 


Creatinine


 1.9 mg/dL


(0.6-1.0) 


 


 





 


Estimated GFR


(Cockcroft-Gault) 28.1 


 


 


 





 


Glucose Level


 174 mg/dL


(70-99) 


 


 





 


Glucose (Fingerstick)


 172 mg/dL


(70-99) 


 


 





 


Calcium Level


 8.9 mg/dL


(8.5-10.1) 


 


 





 


Phosphorus Level


 2.9 mg/dL


(2.6-4.7) 


 


 





 


Albumin


 2.9 g/dL


(3.4-5.0) 


 


 





 


O2 Saturation  97 % (92-99)   


 


Arterial Blood pH


 


 7.47


(7.35-7.45) 


 





 


Arterial Blood pCO2 at


Patient Temp 


 34 mmHg


(35-46) 


 





 


Arterial Blood pO2 at Patient


Temp 


 95 mmHg


() 


 





 


Arterial Blood HCO3


 


 24 mmol/L


(21-28) 


 





 


Arterial Blood Base Excess


 


 1 mmol/L


(-3-3) 


 





 


FiO2  35   








Laboratory Tests








Test


 4/11/20


15:33 4/12/20


00:08 4/12/20


06:00 4/12/20


08:00


 


Glucose (Fingerstick)


 214 mg/dL


(70-99) 208 mg/dL


(70-99) 172 mg/dL


(70-99) 





 


White Blood Count


 


 


 6.6 x10^3/uL


(4.0-11.0) 





 


Red Blood Count


 


 


 2.09 x10^6/uL


(3.50-5.40) 





 


Hemoglobin


 


 


 6.8 g/dL


(12.0-15.5) 





 


Hematocrit


 


 


 21.0 %


(36.0-47.0) 





 


Mean Corpuscular Volume


 


 


 100 fL


() 





 


Mean Corpuscular Hemoglobin   33 pg (25-35)  


 


Mean Corpuscular Hemoglobin


Concent 


 


 33 g/dL


(31-37) 





 


Red Cell Distribution Width


 


 


 22.2 %


(11.5-14.5) 





 


Platelet Count


 


 


 287 x10^3/uL


(140-400) 





 


Neutrophils (%) (Auto)   74 % (31-73)  


 


Lymphocytes (%) (Auto)   10 % (24-48)  


 


Monocytes (%) (Auto)   11 % (0-9)  


 


Eosinophils (%) (Auto)   4 % (0-3)  


 


Basophils (%) (Auto)   1 % (0-3)  


 


Neutrophils # (Auto)


 


 


 4.9 x10^3/uL


(1.8-7.7) 





 


Lymphocytes # (Auto)


 


 


 0.6 x10^3/uL


(1.0-4.8) 





 


Monocytes # (Auto)


 


 


 0.7 x10^3/uL


(0.0-1.1) 





 


Eosinophils # (Auto)


 


 


 0.3 x10^3/uL


(0.0-0.7) 





 


Basophils # (Auto)


 


 


 0.0 x10^3/uL


(0.0-0.2) 





 


Sodium Level


 


 


 135 mmol/L


(136-145) 





 


Potassium Level


 


 


 3.6 mmol/L


(3.5-5.1) 





 


Chloride Level


 


 


 99 mmol/L


() 





 


Carbon Dioxide Level


 


 


 29 mmol/L


(21-32) 





 


Anion Gap   7 (6-14)  


 


Blood Urea Nitrogen


 


 


 56 mg/dL


(7-20) 





 


Creatinine


 


 


 1.9 mg/dL


(0.6-1.0) 





 


Estimated GFR


(Cockcroft-Gault) 


 


 28.1 


 





 


Glucose Level


 


 


 174 mg/dL


(70-99) 





 


Calcium Level


 


 


 8.9 mg/dL


(8.5-10.1) 





 


Phosphorus Level


 


 


 2.9 mg/dL


(2.6-4.7) 





 


Albumin


 


 


 2.9 g/dL


(3.4-5.0) 





 


O2 Saturation    97 % (92-99) 


 


Arterial Blood pH


 


 


 


 7.47


(7.35-7.45)


 


Arterial Blood pCO2 at


Patient Temp 


 


 


 34 mmHg


(35-46)


 


Arterial Blood pO2 at Patient


Temp 


 


 


 95 mmHg


()


 


Arterial Blood HCO3


 


 


 


 24 mmol/L


(21-28)


 


Arterial Blood Base Excess


 


 


 


 1 mmol/L


(-3-3)


 


FiO2    35 








Medications





Active Scripts








 Medications  Dose


 Route/Sig


 Max Daily Dose Days Date Category


 


 Bisoprolol


 Fumarate 5 Mg


 Tablet  10 Mg


 PO DAILY


   3/16/20 Reported








Comments


Chest x-ray reviewed 4/6





IMPRESSION: 


1. Increased moderate to large bilateral posteriorly layering pleural 


effusions with associated atelectasis.





Impression


.


IMPRESSION:


1.  Acute hypoxemic respiratory failure secondary to ARDS status post trach,


2.  Gallstone pancreatitis. WITH NECROSIS


3.  Severe metabolic acidosis.stable


4.  Acute kidney injury-stable


5.  Acute gallstone pancreatitis.


6.  Hypoalbuminemia.


7.  Hypocalcemia.


8.  Leukocytosis


9.  Chronic anemia


10. Covid 19 testing negative


11. Acute /chronic anemia suspected hemorrhagic fluid in pelvis


12, Fever per ID





Plan


.


Cont. current vent setting, ABG reviewedplaced on PS 15/5-- tolerating well 


symptomatic treatment of Fever 


increased gastric secretions, will defer to surgery and ID


Discussed with RN and RT.  Pressure support as tolerated


Transfuse prn, check H/H --today 6.8 


Antibiotics per ID, 


Follow nephrology recs 


Nutritional support with TPN


off pressors today 


Prognosis is guarded





DVT/GI PPX 


d/w RN/RT








CC time : 35 minutes reviewing labs, patient care and discussing will care team











STEVE MIRANDA MD              Apr 12, 2020 10:20

## 2020-04-12 NOTE — NUR
new consent needed for blood transfusion. unable to get ahold of first  
Glen[place], her daughter so message left on phone for second daughter Beth. she phoned unit 
back, was given update on pt and gave consent for pt to receive blood products. will pass on 
in report, will continue to closely monitor.

## 2020-04-12 NOTE — PDOC
[employer] PROGRESS NOTE


Chief Complaint


Chief Complaint


Respiratory failure requiring mechanical ventilation (on vent since 3/23)


bilateral pleural effusions/pulm edema 


Sepsis


Severe Acute gallstone pancreatitis (not a surgical candidate at this time) with

necrosis


Acute kidney failure now requiring dialysis


Salpingitis


Gallstones (Calculus of gallbladder with acute cholecystitis without 

obstruction)


HTN 


Leukocytosis 


Hypoxia


Uterine fibroid


Intractable pain


Intractable nausea


Covid 19 negative. 


Acute on chronic anemia 


EEG: No seizure activity.


ESRD on HD


Hyperglycemia, persistantly in 200s





History of Present Illness


History of Present Illness


[id number]


Patient seen and examined in the ICU


She is on the vent spontaneous respirations with 5 of pressure support 35% FiO2


Has a rectal tube and a [place]


Has a tracheostomy


Urinalysis pending


Levo fed is off


Discussed with RN


Chart reviewed








4/9/20: patient seen in ICU tolerating dialysis. sedated on vent. no acute 

issues overnight. no fever today. 





4/7/2020


No acute events reported overnight, case discussed with nursing staff patient in

no acute distress no complaints during my visit, most likely patient will be 

moving to LTAC soon





4/6/2020


Plans for trach int he am and dialysis in the afternoon, no acute events 

reported overnight. 





4/5/2020


No acute events reported overnight, case discussed with nursing staff patient in

no acute distress during my visit





4/4/2020


No acute events reported overnight, patient receiving dialysis today, case 

discussed with nursing staff patient in no acute distress during my visit








4/3/2020


No new changes remains critically ill, off CRRT, still planning for trach on 

Monday unless we manage to wean over the weekend





4/2/2020


Continues to remain critically ill.  The patient unable to be taken off 

ventilatory support as of yet.,  Discussed with pulmonary consultant.  Planning 

all tracheostomy on Monday if he is unable to be weaned off vent





4/1/2020


No acute events reported overnight, no fever or chills, remains critically 

stable, discussed with mother at bedside. 





[id number]


Patient seen and examined in the ICU


She is critically ill


Mechanically ventilated


Assist-control/25/450/30 with 8 of PEEP


She is on cefepime daptomycin Flagyl and micafungin GEN for antibiotic coverage


Also getting TPN and CRRT


Sedated with Versed


Getting IV albumin also


She is tachycardic at 112 bpm


Temp max 100.0


White blood count is down from 45,000 35,000 today


Chart reviewed


Discussed with RN











[id number]


Patient seen and examined in the ICU


She remains very critically ill


Going for a CT of the abdomen chest and pelvis


Ventilated : On assist control 40% FiO2


Discussed with RN


Chart reviewed








[id number]


Patient seen and examined in the ICU


She is still on CRRT although we plan to change to hemodialysis soon


Chart reviewed


Discussed with RN


Hemoglobin down to 6.9 (suspect CRRT related , Will let nephrology consider 

transfusion while on dialysis)








[id number]


Patient seen and examined in the ICU


She is mechanically ventilated


Before meals/25/450/30%


Also on CRRT


Very critically ill


Chart reviewed


Discussed with RN











[id number]


Patient seen and examined in the ICU


She is now been transferred to the Covid 19 unit so we can rule out Covid and 

keep her in isolation


Very critically ill


Intubated and  ventilated


Assist control 40%


Chart reviewed


Discussed with RN


Still on CRRT


Getting a chest x-ray now


Very concerned about her prognosis








[id number]


Patient seen and examined in the ICU


She is extremely critically ill


Remains mechanically ventilated with assist control/25/450/40%


Also on continuous renal replacement therapy


Chart reviewed


Discussed with RN


She has TPN hanging


Also on pressors











[id number]


Patient seen and examined in the ICU


She is still requiring CRRT


On the vent with assist control but her FiO2 is down to 40% from yesterday


Slightly better but still very critically ill


Discussed with RN


Chart reviewed








[id number]


Patient seen and examined in the ICU


She remains extremely critically ill


On IV fentanyl IV Versed IV Doxy


On the vent


Assist-control/25/450/70%


She is critically ill


Discussed with RN


Chart reviewed


Patient is currently on CRRT as well








[id number]


Patient seen and examined in the ICU


She had to be intubated this morning


On assist-control 25/450/100% with 10 of PEEP and only satting 87%


She is extremely critically ill


I'm not sure if she will survive


Chart reviewed


Discussed with RN











Ms Tadeo is a 48yo F w/ PMHx HTN, prediabetes who presents the emergency room

complaints of abdominal pain. Patient described off and on 3 days. She states is

constant, described as a squeezing sensation in a band-like distribution. + 

nausea, vomiting.  She denies any fever or diarrhea.  Patient denies any 

abdominal surgical procedures.  She states is worse with movements, car ride.  

Pain initially was upper abdomen however now pretty much generalized.  Last 

bowel movement was 3/15/2020. Nothing makes her pain better.  Patient denies any

shortness of breath.  She does state the pain moves into her chest.  Denies any 

headache or visual changes.


Lipase 12255, , , Bilirubin 1.4.


CT abdomen confirms pancreatic inflammation, peripancreatic fluid and 

inflammatory changes around the pancreas consistent with pancreatitis. 

Cholelithiasis and 1.4cm uterine fibroid as well as possible left salpingitis. 

Admitted for further care


GI, General surgery, ID, Pulm consulted.





3/17: Overnight per report no urine output. Added dilaudid for pain, PICC placed

per IR. Renal US negative.Seen bedside in ICU, given 2L additional NSS and 

albumin infusion. Still hypotensive, started on levophed. Repeat CT abdomen with

necrosis. Updated her fiancee


3/18: Sats are only 87% on nasal cannula oxygen. Dialysis catheter per 

nephrology


3/19: She is now on BiPAP appears more ill, now on dialysis


3/20: Seen on BiPAP. Her mother and another family member are present and seemed

to be good support for her. Currently on dialysis. Appears critically ill


3/21: Overnight Tmax 101.7 , still on BiPAP FiO2 40%, still on low dose Levophed

gtt, TPN initiated. On dialysis





Overnight still febrile. Dialysis today. She wakes up and responds to pain. No 

CP.





Vitals/I&O


Vitals/I&O:





                                   Vital Signs








  Date Time  Temp Pulse Resp B/P (MAP) Pulse Ox O2 Delivery O2 Flow Rate FiO2


 


4/12/20 11:48     98 Ventilator  


 


4/12/20 06:48   18     


 


4/12/20 06:00  113  98/51 (67)    


 


4/12/20 04:00 100.0       





 100.0       


 


4/11/20 08:23       6.0 














                                    I & O   


 


 4/11/20 4/11/20 4/12/20





 15:00 23:00 07:00


 


Intake Total 400 ml 50 ml 2171.3 ml


 


Output Total 115 ml 120 ml 125 ml


 


Balance 285 ml -70 ml 2046.3 ml











Physical Exam


Physical Exam:


GENERAL: Sedated, generalized anasarca 


HEENT: Pupils equal, + NGT, oral cavity dry 


NECK:  Trach/vent 


LUNGS: Diminished aeration 


HEART:  S1, S2, regular 


ABDOMEN:  Distended, hypoactive BS, Rectal tube in place 


: [place]  (3/17)


EXTREMITIES: Generalized edema, no cyanosis, SCDs bilaterally 


DERMATOLOGIC:  Warm and dry.  No generalized rash.  


CENTRAL NERVOUS SYSTEM: Opens eyes to tactile stimuli


HDC, LIJ and RUE-PICC without signs of complications


General:  No acute distress


Heart:  Other (increased rate)


Lungs:  Other (dimished in BLL)


Abdomen:  Normal bowel sounds, Soft, No tenderness


Extremities:  No edema, Other (SOME CLUBBING )


Skin:  Other (mottling noted to extremities )





Labs


Labs:





Laboratory Tests








Test


 4/11/20


15:33 4/12/20


00:08 4/12/20


06:00 4/12/20


08:00


 


Glucose (Fingerstick)


 214 mg/dL


(70-99) 208 mg/dL


(70-99) 172 mg/dL


(70-99) 





 


White Blood Count


 


 


 6.6 x10^3/uL


(4.0-11.0) 





 


Red Blood Count


 


 


 2.09 x10^6/uL


(3.50-5.40) 





 


Hemoglobin


 


 


 6.8 g/dL


(12.0-15.5) 





 


Hematocrit


 


 


 21.0 %


(36.0-47.0) 





 


Mean Corpuscular Volume


 


 


 100 fL


() 





 


Mean Corpuscular Hemoglobin   33 pg (25-35)  


 


Mean Corpuscular Hemoglobin


Concent 


 


 33 g/dL


(31-37) 





 


Red Cell Distribution Width


 


 


 22.2 %


(11.5-14.5) 





 


Platelet Count


 


 


 287 x10^3/uL


(140-400) 





 


Neutrophils (%) (Auto)   74 % (31-73)  


 


Lymphocytes (%) (Auto)   10 % (24-48)  


 


Monocytes (%) (Auto)   11 % (0-9)  


 


Eosinophils (%) (Auto)   4 % (0-3)  


 


Basophils (%) (Auto)   1 % (0-3)  


 


Neutrophils # (Auto)


 


 


 4.9 x10^3/uL


(1.8-7.7) 





 


Lymphocytes # (Auto)


 


 


 0.6 x10^3/uL


(1.0-4.8) 





 


Monocytes # (Auto)


 


 


 0.7 x10^3/uL


(0.0-1.1) 





 


Eosinophils # (Auto)


 


 


 0.3 x10^3/uL


(0.0-0.7) 





 


Basophils # (Auto)


 


 


 0.0 x10^3/uL


(0.0-0.2) 





 


Sodium Level


 


 


 135 mmol/L


(136-145) 





 


Potassium Level


 


 


 3.6 mmol/L


(3.5-5.1) 





 


Chloride Level


 


 


 99 mmol/L


() 





 


Carbon Dioxide Level


 


 


 29 mmol/L


(21-32) 





 


Anion Gap   7 (6-14)  


 


Blood Urea Nitrogen


 


 


 56 mg/dL


(7-20) 





 


Creatinine


 


 


 1.9 mg/dL


(0.6-1.0) 





 


Estimated GFR


(Cockcroft-Gault) 


 


 28.1 


 





 


Glucose Level


 


 


 174 mg/dL


(70-99) 





 


Calcium Level


 


 


 8.9 mg/dL


(8.5-10.1) 





 


Phosphorus Level


 


 


 2.9 mg/dL


(2.6-4.7) 





 


Albumin


 


 


 2.9 g/dL


(3.4-5.0) 





 


O2 Saturation    97 % (92-99) 


 


Arterial Blood pH


 


 


 


 7.47


(7.35-7.45)


 


Arterial Blood pCO2 at


Patient Temp 


 


 


 34 mmHg


(35-46)


 


Arterial Blood pO2 at Patient


Temp 


 


 


 95 mmHg


()


 


Arterial Blood HCO3


 


 


 


 24 mmol/L


(21-28)


 


Arterial Blood Base Excess


 


 


 


 1 mmol/L


(-3-3)


 


FiO2    35 


 


Test


 4/12/20


12:00 4/12/20


12:42 


 





 


Urine Collection Type Unknown    


 


Urine Color Brown    


 


Urine Clarity Cloudy    


 


Urine pH 5.0 (<5.0-8.0)    


 


Urine Specific Gravity


 >=1.030


(1.000-1.030) 


 


 





 


Urine Protein


 100 mg/dL


(NEG-TRACE) 


 


 





 


Urine Glucose (UA)


 100 mg/dL


(NEG) 


 


 





 


Urine Ketones (Stick)


 Trace mg/dL


(NEG) 


 


 





 


Urine Blood Large (NEG)    


 


Urine Nitrite Positive (NEG)    


 


Urine Bilirubin Small (NEG)    


 


Urine Urobilinogen Dipstick


 1.0 mg/dL (0.2


mg/dL) 


 


 





 


Urine Leukocyte Esterase Small (NEG)    


 


Urine RBC >40 /HPF (0-2)    


 


Urine WBC 1-4 /HPF (0-4)    


 


Urine Squamous Epithelial


Cells Occ /LPF 


 


 


 





 


Urine Transitional Epithelial


Cells Few /LPF 


 


 


 





 


Urine Amorphous Sediment Present /HPF    


 


Urine Bacteria


 Few /HPF


(0-FEW) 


 


 





 


Urine Hyaline Casts Few /HPF    


 


Urine Mucus Slight /LPF    


 


Glucose (Fingerstick)


 


 223 mg/dL


(70-99) 


 














Assessment and Plan


Assessmemt and Plan


Problems


Medical Problems:


(1) Acute pancreatitis


Status: Acute  





(2) Cholelithiasis


Status: Acute  





Respiratory failure requiring intubation


Severe Acute gallstone pancreatitis (she is not a surgical candidate as she is 

too ill)


Acute kidney failure now requiring dialysis


Salpingo--itis


Gallstones (Calculus of gallbladder with acute cholecystitis without 

obstruction)


HTN 


Leukocytosis 


Hypoxia


Uterine fibroid


Hypoxia with respiratory failure


Intractable pain


Intractable nausea





Plan


ICU monitoring


Vent management per pulm. pressure support as tolerated


Dialysis per nephro


Merrem (4/8) and Zyvox (4/10) and micafungin 


Follow cultures


Trach care


Nutritional support


When necessary levo fed


Neuro following


No plans for sx at present as she is to ill still


Discharge disposition pending


She is still critically ill


Prognosis very guarded


Total time 35 min





Comment


Review of Relevant


I have reviewed the following items josy (where applicable) has been applied.


Medications:





Current Medications








 Medications


  (Trade)  Dose


 Ordered  Sig/Yee


 Route


 PRN Reason  Start Time


 Stop Time Status Last Admin


Dose Admin


 


 Sodium Chloride


 90 meq/Potassium


 Phosphate 15 mmol/


 Magnesium Sulfate


 12 meq/Calcium


 Gluconate 15 meq/


 Multivitamins 10


 ml/Chromium/


 Copper/Manganese/


 Seleni/Zn 0.5 ml/


 Insulin Human


 Regular 40 unit/


 Total Parenteral


 Nutrition/Amino


 Acids/Dextrose/


 Fat Emulsion


 Intravenous  1,400 ml @ 


 58.333 mls/


 hr  TPN  CONT


 IV


   4/11/20 22:00


 4/12/20 21:59  4/11/20 21:21





 


 Albumin Human  200 ml @ 


 200 mls/hr  1X PRN  PRN


 IV


 Hypotension  4/11/20 13:30


 4/11/20 19:29 DC 4/11/20 15:00














Hemodynamically unstable?:  No


Is patient in severe pain?:  No


Is NPO status required?:  Yes











HECTOR MASON III DO           Apr 12, 2020 13:13

## 2020-04-12 NOTE — NUR
Pharmacy TPN Dosing Note



S: JESENIA RICH is a 49 year old F Currently receiving Central Continuous TPN started 
03/18/20



B:Pertinent PMH: 

Necrotizing pancreatitis

Height: 5 feet, 8 inches

Weight: 102.0 kg



Current diet: NPO 



LABS:

Sodium:    135 

Potassium: 3.6 

Chloride:  99 

Calcium:   8.9 

Corrected Calcium: 9.78 

Magnesium: 2.1 

CO2:       29 

SCr:       1.9 

Glucose:   174 

Albumin:   2.9 

AST:       47 

ALT:       20 



TPN FORMULA:

TPN TYPE:  Central Continuous

AMINO ACIDS:         125 gm

DEXTROSE:            225 gm

LIPIDS:              20 gm

SODIUM CHLORIDE:     90 mEq

SODIUM ACETATE:      -- mEq

SODIUM PHOSPHATE:    - mmol

POTASSIUM CHLORIDE:  - mEq

POTASSIUM ACETATE:   -- mEq

POTASSIUM PHOSPHATE: 19 mmol

MAGNESIUM:           12 mEq

CALCIUM:             15 mEq

INSULIN:             40 units

MULTIPLE VITAMIN:    10 ml

TRACE ELEMENTS:      0.5 ml(s)



TPN PLAN:  

Triglycerides >400 - lipid content reduced to 20g/bag, dextrose increased

to 225gm per dietician, check trigs again monday

Increase KPhos to 19mmol





R: Continue TPN as written above.

Will monitor electrolytes, glucose, and tolerance to TPN.



 PRESTON MCCULLOUGH RPH, 04/12/20 [id number]

## 2020-04-12 NOTE — PDOC
Renal-Progress Notes


Subjective Notes


Notes


REMAINS ON THE VENT





History of Present Illness


Hx of present illness


STABLE





Vitals


Vitals





Vital Signs








  Date Time  Temp Pulse Resp B/P (MAP) Pulse Ox O2 Delivery O2 Flow Rate FiO2


 


4/12/20 11:48     98 Ventilator  


 


4/12/20 06:48   18     


 


4/12/20 06:00  113  98/51 (67)    


 


4/12/20 04:00 100.0       





 100.0       


 


4/11/20 08:23       6.0 








Weight


Weight [ ]





I.O.


Intake and Output











Intake and Output 


 


 4/12/20





 07:00


 


Intake Total 2621.3 ml


 


Output Total 360 ml


 


Balance 2261.3 ml


 


 


 


Intake Oral 0 ml


 


IV Total 2621.3 ml


 


Output Urine Total 360 ml











Labs


Labs





Laboratory Tests








Test


 4/11/20


15:33 4/12/20


00:08 4/12/20


06:00 4/12/20


08:00


 


Glucose (Fingerstick)


 214 mg/dL


(70-99) 208 mg/dL


(70-99) 172 mg/dL


(70-99) 





 


White Blood Count


 


 


 6.6 x10^3/uL


(4.0-11.0) 





 


Red Blood Count


 


 


 2.09 x10^6/uL


(3.50-5.40) 





 


Hemoglobin


 


 


 6.8 g/dL


(12.0-15.5) 





 


Hematocrit


 


 


 21.0 %


(36.0-47.0) 





 


Mean Corpuscular Volume


 


 


 100 fL


() 





 


Mean Corpuscular Hemoglobin   33 pg (25-35)  


 


Mean Corpuscular Hemoglobin


Concent 


 


 33 g/dL


(31-37) 





 


Red Cell Distribution Width


 


 


 22.2 %


(11.5-14.5) 





 


Platelet Count


 


 


 287 x10^3/uL


(140-400) 





 


Neutrophils (%) (Auto)   74 % (31-73)  


 


Lymphocytes (%) (Auto)   10 % (24-48)  


 


Monocytes (%) (Auto)   11 % (0-9)  


 


Eosinophils (%) (Auto)   4 % (0-3)  


 


Basophils (%) (Auto)   1 % (0-3)  


 


Neutrophils # (Auto)


 


 


 4.9 x10^3/uL


(1.8-7.7) 





 


Lymphocytes # (Auto)


 


 


 0.6 x10^3/uL


(1.0-4.8) 





 


Monocytes # (Auto)


 


 


 0.7 x10^3/uL


(0.0-1.1) 





 


Eosinophils # (Auto)


 


 


 0.3 x10^3/uL


(0.0-0.7) 





 


Basophils # (Auto)


 


 


 0.0 x10^3/uL


(0.0-0.2) 





 


Sodium Level


 


 


 135 mmol/L


(136-145) 





 


Potassium Level


 


 


 3.6 mmol/L


(3.5-5.1) 





 


Chloride Level


 


 


 99 mmol/L


() 





 


Carbon Dioxide Level


 


 


 29 mmol/L


(21-32) 





 


Anion Gap   7 (6-14)  


 


Blood Urea Nitrogen


 


 


 56 mg/dL


(7-20) 





 


Creatinine


 


 


 1.9 mg/dL


(0.6-1.0) 





 


Estimated GFR


(Cockcroft-Gault) 


 


 28.1 


 





 


Glucose Level


 


 


 174 mg/dL


(70-99) 





 


Calcium Level


 


 


 8.9 mg/dL


(8.5-10.1) 





 


Phosphorus Level


 


 


 2.9 mg/dL


(2.6-4.7) 





 


Albumin


 


 


 2.9 g/dL


(3.4-5.0) 





 


O2 Saturation    97 % (92-99) 


 


Arterial Blood pH


 


 


 


 7.47


(7.35-7.45)


 


Arterial Blood pCO2 at


Patient Temp 


 


 


 34 mmHg


(35-46)


 


Arterial Blood pO2 at Patient


Temp 


 


 


 95 mmHg


()


 


Arterial Blood HCO3


 


 


 


 24 mmol/L


(21-28)


 


Arterial Blood Base Excess


 


 


 


 1 mmol/L


(-3-3)


 


FiO2    35 


 


Test


 4/12/20


12:00 4/12/20


12:42 


 





 


Urine Collection Type Unknown    


 


Urine Color Brown    


 


Urine Clarity Cloudy    


 


Urine pH 5.0 (<5.0-8.0)    


 


Urine Specific Gravity


 >=1.030


(1.000-1.030) 


 


 





 


Urine Protein


 100 mg/dL


(NEG-TRACE) 


 


 





 


Urine Glucose (UA)


 100 mg/dL


(NEG) 


 


 





 


Urine Ketones (Stick)


 Trace mg/dL


(NEG) 


 


 





 


Urine Blood Large (NEG)    


 


Urine Nitrite Positive (NEG)    


 


Urine Bilirubin Small (NEG)    


 


Urine Urobilinogen Dipstick


 1.0 mg/dL (0.2


mg/dL) 


 


 





 


Urine Leukocyte Esterase Small (NEG)    


 


Urine RBC >40 /HPF (0-2)    


 


Urine WBC 1-4 /HPF (0-4)    


 


Urine Squamous Epithelial


Cells Occ /LPF 


 


 


 





 


Urine Transitional Epithelial


Cells Few /LPF 


 


 


 





 


Urine Amorphous Sediment Present /HPF    


 


Urine Bacteria


 Few /HPF


(0-FEW) 


 


 





 


Urine Hyaline Casts Few /HPF    


 


Urine Mucus Slight /LPF    


 


Glucose (Fingerstick)


 


 223 mg/dL


(70-99) 


 














Micro


Micro





Microbiology


4/10/20 Blood Culture - Preliminary, Resulted


          NO GROWTH AFTER 2 DAYS


4/4/20 Urine Culture - Final, Complete


         


4/4/20 Urine Culture Result 1 (ANSON) - Final, Complete





Review of Systems


Constitutional:  yes: other (ON THE VENT)





Physical Exam


General Appearance:  other (ON THE VENT)


Skin:  warm


Respiratory:  decreased breath sounds


Heart:  S1S2


Abdomen:  soft, bowel sounds present


Genitourinary:  bladder flat


Extremities:  pulses present, edema


Neurology:  other (SEDATED)





Assessment


Assessment


IMP





OIB-EUV-ALHPSF-OFF CRRT


ANEMIA


LEUCOCYTOSIS-IMPROVING


ANASARCA DUE TO 3RD SPACING


HYPERKALEMIA-RESOLVED


ACIDOSIS AND ACIDEMIA-CONTROLLED


ACUTE REPS FAILURE


ACUTE PANCREATITIS


HYPOALBUMINEMIA


HYPOCALCEMIA-FROM SAPONIFICATION-BETTER


POOR HD CATHETER FUNCTION





PLAN





PRBC TODAY


TPN TO CONTINUE AS NEEDED


PRBC AS NEEDED


TREAT LOW CA ONLY FOR ARRHYTHMIA OR SYMPTOMS


CONT YOLI


VENT SUPPORT


PRESSORS AS NEEDED


ANTIBIOTICS


WILL FOLLOW


VERY POOR PROGNOSIS











BETH HEIN MD                 Apr 12, 2020 13:05

## 2020-04-12 NOTE — PDOC
Infectious Disease Note


Subjective


Subjective


Sedated


Trach, vent FiO2 35 % PEEP 5


Now off levaphed 


Fever Tmax 101.3 


+ diarrhea 


TPN





ROS


ROS


unobtainable





Vital Sign


Vital Signs





Vital Signs








  Date Time  Temp Pulse Resp B/P (MAP) Pulse Ox O2 Delivery O2 Flow Rate FiO2


 


4/12/20 07:50     100 Ventilator  


 


4/12/20 06:48   18     


 


4/12/20 06:00  113  98/51 (67)    


 


4/12/20 04:00 100.0       





 100.0       


 


4/11/20 08:23       6.0 











Physical Exam


PHYSICAL EXAM


GENERAL: Sedated, generalized anasarca 


HEENT: Pupils equal, + NGT, oral cavity dry 


NECK:  Trach/vent 


LUNGS: Diminished aeration 


HEART:  S1, S2, regular 


ABDOMEN:  Distended, hypoactive BS, Rectal tube in place 


: [place]  (3/17)


EXTREMITIES: Generalized edema, no cyanosis, SCDs bilaterally 


DERMATOLOGIC:  Warm and dry.  No generalized rash.  


CENTRAL NERVOUS SYSTEM: Opens eyes to tactile stimuli


HDC, LIJ and RUE-PICC without signs of complications





Labs


Lab





Laboratory Tests








Test


 4/11/20


15:33 4/12/20


00:08 4/12/20


06:00


 


Glucose (Fingerstick)


 214 mg/dL


(70-99) 208 mg/dL


(70-99) 172 mg/dL


(70-99)


 


White Blood Count


 


 


 6.6 x10^3/uL


(4.0-11.0)


 


Red Blood Count


 


 


 2.09 x10^6/uL


(3.50-5.40)


 


Hemoglobin


 


 


 6.8 g/dL


(12.0-15.5)


 


Hematocrit


 


 


 21.0 %


(36.0-47.0)


 


Mean Corpuscular Volume


 


 


 100 fL


()


 


Mean Corpuscular Hemoglobin   33 pg (25-35) 


 


Mean Corpuscular Hemoglobin


Concent 


 


 33 g/dL


(31-37)


 


Red Cell Distribution Width


 


 


 22.2 %


(11.5-14.5)


 


Platelet Count


 


 


 287 x10^3/uL


(140-400)


 


Neutrophils (%) (Auto)   74 % (31-73) 


 


Lymphocytes (%) (Auto)   10 % (24-48) 


 


Monocytes (%) (Auto)   11 % (0-9) 


 


Eosinophils (%) (Auto)   4 % (0-3) 


 


Basophils (%) (Auto)   1 % (0-3) 


 


Neutrophils # (Auto)


 


 


 4.9 x10^3/uL


(1.8-7.7)


 


Lymphocytes # (Auto)


 


 


 0.6 x10^3/uL


(1.0-4.8)


 


Monocytes # (Auto)


 


 


 0.7 x10^3/uL


(0.0-1.1)


 


Eosinophils # (Auto)


 


 


 0.3 x10^3/uL


(0.0-0.7)


 


Basophils # (Auto)


 


 


 0.0 x10^3/uL


(0.0-0.2)


 


Sodium Level


 


 


 135 mmol/L


(136-145)


 


Potassium Level


 


 


 3.6 mmol/L


(3.5-5.1)


 


Chloride Level


 


 


 99 mmol/L


()


 


Carbon Dioxide Level


 


 


 29 mmol/L


(21-32)


 


Anion Gap   7 (6-14) 


 


Blood Urea Nitrogen


 


 


 56 mg/dL


(7-20)


 


Creatinine


 


 


 1.9 mg/dL


(0.6-1.0)


 


Estimated GFR


(Cockcroft-Gault) 


 


 28.1 





 


Glucose Level


 


 


 174 mg/dL


(70-99)


 


Calcium Level


 


 


 8.9 mg/dL


(8.5-10.1)


 


Phosphorus Level


 


 


 2.9 mg/dL


(2.6-4.7)


 


Albumin


 


 


 2.9 g/dL


(3.4-5.0)








Micro


CT A/P, 4/9


IMPRESSION:


1. Increased ascites.


2. 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


at the pancreas


3. Cholelithiasis with thickening of the gallbladder wall.


4. Persistent pleural effusions and atelectasis in the lung bases.





Objective


Assessment


Leukocytosis -improved 


Fever.


Severe acute gallstone pancreatitis with necrosis


   -CT 4/9 necrotizing pancreatitis with fluid and phlegmon at the pancreas


   -not a surgical candidate at this time


Hypotension on levaphed 


JED requiring HD 


   - s/p RIJ temporary dialysis catheter replacement, 4/2


Vent dependent respiratory failure


   -s/p Trach placement 


   -lung opacities, COVID-19 neg 


Anasarca - worse


Anemia - S/p PRBC 3/26


Hypocalcemia 


Prediabetes


HTN


Diarrhea, C. diff neg 3/23





Plan


Plan of Care


cont merrem (4/8) and Zyvox (4/10) and micafungin 


-previously on cefepime, flagyl & dapto 


Gen surgery following


f/u BC from 4/9 neg to date 


UA C&S pending 


Maintain aspiration precautions 


Anemia deferred to primary





Critically ill








D/W RN





Patient seen and examined. Chart reviewed in detail. Case discussed with NP. 

Agree with above plan.











HAVEN WINTERS APRN        Apr 12, 2020 08:28


BISMARK HERNANDEZ MD             Apr 13, 2020 18:00

## 2020-04-12 NOTE — PDOC
SURGICAL PROGRESS NOTE


Subjective


Patient with trach on the ventilator minimally responsive


Vital Signs





Vital Signs








  Date Time  Temp Pulse Resp B/P (MAP) Pulse Ox O2 Delivery O2 Flow Rate FiO2


 


4/12/20 08:32     99 Ventilator  


 


4/12/20 06:48   18     


 


4/12/20 06:00  113  98/51 (67)    


 


4/12/20 04:00 100.0       





 100.0       


 


4/11/20 08:23       6.0 








I&O











Intake and Output 


 


 4/12/20





 07:00


 


Intake Total 2621.3 ml


 


Output Total 360 ml


 


Balance 2261.3 ml


 


 


 


Intake Oral 0 ml


 


IV Total 2621.3 ml


 


Output Urine Total 360 ml








PATIENT HAS A VILLASENOR:  Yes


Abdomen:  Normal bowel sounds, Soft, No tenderness


Labs





Laboratory Tests








Test


 4/10/20


12:29 4/10/20


18:12 4/11/20


00:26 4/11/20


06:10


 


Glucose (Fingerstick)


 247 mg/dL


(70-99) 270 mg/dL


(70-99) 272 mg/dL


(70-99) 





 


Sodium Level


 


 


 


 135 mmol/L


(136-145)


 


Potassium Level


 


 


 


 3.6 mmol/L


(3.5-5.1)


 


Chloride Level


 


 


 


 98 mmol/L


()


 


Carbon Dioxide Level


 


 


 


 25 mmol/L


(21-32)


 


Anion Gap    12 (6-14) 


 


Blood Urea Nitrogen


 


 


 


 72 mg/dL


(7-20)


 


Creatinine


 


 


 


 2.5 mg/dL


(0.6-1.0)


 


Estimated GFR


(Cockcroft-Gault) 


 


 


 20.5 





 


Glucose Level


 


 


 


 268 mg/dL


(70-99)


 


Calcium Level


 


 


 


 8.9 mg/dL


(8.5-10.1)


 


Phosphorus Level


 


 


 


 3.4 mg/dL


(2.6-4.7)


 


Albumin


 


 


 


 2.6 g/dL


(3.4-5.0)


 


Test


 4/11/20


06:11 4/11/20


08:00 4/11/20


15:33 4/12/20


00:08


 


Glucose (Fingerstick)


 261 mg/dL


(70-99) 


 214 mg/dL


(70-99) 208 mg/dL


(70-99)


 


O2 Saturation  94 % (92-99)   


 


Arterial Blood pH


 


 7.38


(7.35-7.45) 


 





 


Arterial Blood pCO2 at


Patient Temp 


 36 mmHg


(35-46) 


 





 


Arterial Blood pO2 at Patient


Temp 


 75 mmHg


() 


 





 


Arterial Blood HCO3


 


 21 mmol/L


(21-28) 


 





 


Arterial Blood Base Excess


 


 -4 mmol/L


(-3-3) 


 





 


FiO2  35   


 


Test


 4/12/20


06:00 4/12/20


08:00 


 





 


White Blood Count


 6.6 x10^3/uL


(4.0-11.0) 


 


 





 


Red Blood Count


 2.09 x10^6/uL


(3.50-5.40) 


 


 





 


Hemoglobin


 6.8 g/dL


(12.0-15.5) 


 


 





 


Hematocrit


 21.0 %


(36.0-47.0) 


 


 





 


Mean Corpuscular Volume


 100 fL


() 


 


 





 


Mean Corpuscular Hemoglobin 33 pg (25-35)    


 


Mean Corpuscular Hemoglobin


Concent 33 g/dL


(31-37) 


 


 





 


Red Cell Distribution Width


 22.2 %


(11.5-14.5) 


 


 





 


Platelet Count


 287 x10^3/uL


(140-400) 


 


 





 


Neutrophils (%) (Auto) 74 % (31-73)    


 


Lymphocytes (%) (Auto) 10 % (24-48)    


 


Monocytes (%) (Auto) 11 % (0-9)    


 


Eosinophils (%) (Auto) 4 % (0-3)    


 


Basophils (%) (Auto) 1 % (0-3)    


 


Neutrophils # (Auto)


 4.9 x10^3/uL


(1.8-7.7) 


 


 





 


Lymphocytes # (Auto)


 0.6 x10^3/uL


(1.0-4.8) 


 


 





 


Monocytes # (Auto)


 0.7 x10^3/uL


(0.0-1.1) 


 


 





 


Eosinophils # (Auto)


 0.3 x10^3/uL


(0.0-0.7) 


 


 





 


Basophils # (Auto)


 0.0 x10^3/uL


(0.0-0.2) 


 


 





 


Sodium Level


 135 mmol/L


(136-145) 


 


 





 


Potassium Level


 3.6 mmol/L


(3.5-5.1) 


 


 





 


Chloride Level


 99 mmol/L


() 


 


 





 


Carbon Dioxide Level


 29 mmol/L


(21-32) 


 


 





 


Anion Gap 7 (6-14)    


 


Blood Urea Nitrogen


 56 mg/dL


(7-20) 


 


 





 


Creatinine


 1.9 mg/dL


(0.6-1.0) 


 


 





 


Estimated GFR


(Cockcroft-Gault) 28.1 


 


 


 





 


Glucose Level


 174 mg/dL


(70-99) 


 


 





 


Glucose (Fingerstick)


 172 mg/dL


(70-99) 


 


 





 


Calcium Level


 8.9 mg/dL


(8.5-10.1) 


 


 





 


Phosphorus Level


 2.9 mg/dL


(2.6-4.7) 


 


 





 


Albumin


 2.9 g/dL


(3.4-5.0) 


 


 





 


O2 Saturation  97 % (92-99)   


 


Arterial Blood pH


 


 7.47


(7.35-7.45) 


 





 


Arterial Blood pCO2 at


Patient Temp 


 34 mmHg


(35-46) 


 





 


Arterial Blood pO2 at Patient


Temp 


 95 mmHg


() 


 





 


Arterial Blood HCO3


 


 24 mmol/L


(21-28) 


 





 


Arterial Blood Base Excess


 


 1 mmol/L


(-3-3) 


 





 


FiO2  35   








Laboratory Tests








Test


 4/11/20


15:33 4/12/20


00:08 4/12/20


06:00 4/12/20


08:00


 


Glucose (Fingerstick)


 214 mg/dL


(70-99) 208 mg/dL


(70-99) 172 mg/dL


(70-99) 





 


White Blood Count


 


 


 6.6 x10^3/uL


(4.0-11.0) 





 


Red Blood Count


 


 


 2.09 x10^6/uL


(3.50-5.40) 





 


Hemoglobin


 


 


 6.8 g/dL


(12.0-15.5) 





 


Hematocrit


 


 


 21.0 %


(36.0-47.0) 





 


Mean Corpuscular Volume


 


 


 100 fL


() 





 


Mean Corpuscular Hemoglobin   33 pg (25-35)  


 


Mean Corpuscular Hemoglobin


Concent 


 


 33 g/dL


(31-37) 





 


Red Cell Distribution Width


 


 


 22.2 %


(11.5-14.5) 





 


Platelet Count


 


 


 287 x10^3/uL


(140-400) 





 


Neutrophils (%) (Auto)   74 % (31-73)  


 


Lymphocytes (%) (Auto)   10 % (24-48)  


 


Monocytes (%) (Auto)   11 % (0-9)  


 


Eosinophils (%) (Auto)   4 % (0-3)  


 


Basophils (%) (Auto)   1 % (0-3)  


 


Neutrophils # (Auto)


 


 


 4.9 x10^3/uL


(1.8-7.7) 





 


Lymphocytes # (Auto)


 


 


 0.6 x10^3/uL


(1.0-4.8) 





 


Monocytes # (Auto)


 


 


 0.7 x10^3/uL


(0.0-1.1) 





 


Eosinophils # (Auto)


 


 


 0.3 x10^3/uL


(0.0-0.7) 





 


Basophils # (Auto)


 


 


 0.0 x10^3/uL


(0.0-0.2) 





 


Sodium Level


 


 


 135 mmol/L


(136-145) 





 


Potassium Level


 


 


 3.6 mmol/L


(3.5-5.1) 





 


Chloride Level


 


 


 99 mmol/L


() 





 


Carbon Dioxide Level


 


 


 29 mmol/L


(21-32) 





 


Anion Gap   7 (6-14)  


 


Blood Urea Nitrogen


 


 


 56 mg/dL


(7-20) 





 


Creatinine


 


 


 1.9 mg/dL


(0.6-1.0) 





 


Estimated GFR


(Cockcroft-Gault) 


 


 28.1 


 





 


Glucose Level


 


 


 174 mg/dL


(70-99) 





 


Calcium Level


 


 


 8.9 mg/dL


(8.5-10.1) 





 


Phosphorus Level


 


 


 2.9 mg/dL


(2.6-4.7) 





 


Albumin


 


 


 2.9 g/dL


(3.4-5.0) 





 


O2 Saturation    97 % (92-99) 


 


Arterial Blood pH


 


 


 


 7.47


(7.35-7.45)


 


Arterial Blood pCO2 at


Patient Temp 


 


 


 34 mmHg


(35-46)


 


Arterial Blood pO2 at Patient


Temp 


 


 


 95 mmHg


()


 


Arterial Blood HCO3


 


 


 


 24 mmol/L


(21-28)


 


Arterial Blood Base Excess


 


 


 


 1 mmol/L


(-3-3)


 


FiO2    35 








Problem List


Problems


Medical Problems:


(1) Acute pancreatitis


Status: Acute  





(2) Cholelithiasis


Status: Acute  








Assessment/Plan


Pancreatitis


Continue supportive care


No new surgical recommendations











OVIDIO MEDINA MD             Apr 12, 2020 09:23

## 2020-04-13 VITALS — SYSTOLIC BLOOD PRESSURE: 167 MMHG | DIASTOLIC BLOOD PRESSURE: 86 MMHG

## 2020-04-13 VITALS — DIASTOLIC BLOOD PRESSURE: 84 MMHG | SYSTOLIC BLOOD PRESSURE: 147 MMHG

## 2020-04-13 VITALS — SYSTOLIC BLOOD PRESSURE: 140 MMHG | DIASTOLIC BLOOD PRESSURE: 66 MMHG

## 2020-04-13 VITALS — DIASTOLIC BLOOD PRESSURE: 78 MMHG | SYSTOLIC BLOOD PRESSURE: 161 MMHG

## 2020-04-13 VITALS — DIASTOLIC BLOOD PRESSURE: 87 MMHG | SYSTOLIC BLOOD PRESSURE: 153 MMHG

## 2020-04-13 VITALS — DIASTOLIC BLOOD PRESSURE: 71 MMHG | SYSTOLIC BLOOD PRESSURE: 113 MMHG

## 2020-04-13 VITALS — SYSTOLIC BLOOD PRESSURE: 150 MMHG | DIASTOLIC BLOOD PRESSURE: 86 MMHG

## 2020-04-13 VITALS — DIASTOLIC BLOOD PRESSURE: 80 MMHG | SYSTOLIC BLOOD PRESSURE: 136 MMHG

## 2020-04-13 VITALS — SYSTOLIC BLOOD PRESSURE: 132 MMHG | DIASTOLIC BLOOD PRESSURE: 77 MMHG

## 2020-04-13 VITALS — SYSTOLIC BLOOD PRESSURE: 93 MMHG | DIASTOLIC BLOOD PRESSURE: 53 MMHG

## 2020-04-13 VITALS — SYSTOLIC BLOOD PRESSURE: 135 MMHG | DIASTOLIC BLOOD PRESSURE: 74 MMHG

## 2020-04-13 VITALS — SYSTOLIC BLOOD PRESSURE: 149 MMHG | DIASTOLIC BLOOD PRESSURE: 78 MMHG

## 2020-04-13 VITALS — SYSTOLIC BLOOD PRESSURE: 159 MMHG | DIASTOLIC BLOOD PRESSURE: 84 MMHG

## 2020-04-13 VITALS — SYSTOLIC BLOOD PRESSURE: 122 MMHG | DIASTOLIC BLOOD PRESSURE: 64 MMHG

## 2020-04-13 VITALS — DIASTOLIC BLOOD PRESSURE: 78 MMHG | SYSTOLIC BLOOD PRESSURE: 146 MMHG

## 2020-04-13 VITALS — SYSTOLIC BLOOD PRESSURE: 106 MMHG | DIASTOLIC BLOOD PRESSURE: 55 MMHG

## 2020-04-13 VITALS — DIASTOLIC BLOOD PRESSURE: 51 MMHG | SYSTOLIC BLOOD PRESSURE: 96 MMHG

## 2020-04-13 VITALS — DIASTOLIC BLOOD PRESSURE: 97 MMHG | SYSTOLIC BLOOD PRESSURE: 154 MMHG

## 2020-04-13 VITALS — DIASTOLIC BLOOD PRESSURE: 71 MMHG | SYSTOLIC BLOOD PRESSURE: 125 MMHG

## 2020-04-13 VITALS — DIASTOLIC BLOOD PRESSURE: 78 MMHG | SYSTOLIC BLOOD PRESSURE: 131 MMHG

## 2020-04-13 LAB
ANION GAP SERPL CALC-SCNC: 14 MMOL/L (ref 6–14)
BASE EXCESS ABG: 2 MMOL/L (ref -3–3)
BASOPHILS # BLD AUTO: 0 X10^3/UL (ref 0–0.2)
BASOPHILS NFR BLD: 0 % (ref 0–3)
BUN SERPL-MCNC: 80 MG/DL (ref 7–20)
CALCIUM SERPL-MCNC: 9 MG/DL (ref 8.5–10.1)
CHLORIDE SERPL-SCNC: 98 MMOL/L (ref 98–107)
CO2 SERPL-SCNC: 23 MMOL/L (ref 21–32)
CREAT SERPL-MCNC: 2.1 MG/DL (ref 0.6–1)
EOSINOPHIL NFR BLD: 0.1 X10^3/UL (ref 0–0.7)
EOSINOPHIL NFR BLD: 2 % (ref 0–3)
ERYTHROCYTE [DISTWIDTH] IN BLOOD BY AUTOMATED COUNT: 19.3 % (ref 11.5–14.5)
GFR SERPLBLD BASED ON 1.73 SQ M-ARVRAT: 25 ML/MIN
GLUCOSE SERPL-MCNC: 167 MG/DL (ref 70–99)
HBA1C MFR BLD: 5.4 % (ref 4.8–5.6)
HCO3 BLDA-SCNC: 26 MMOL/L (ref 21–28)
HCT VFR BLD CALC: 24.3 % (ref 36–47)
HGB BLD-MCNC: 8 G/DL (ref 12–15.5)
INSPIRATION SETTING TIME VENT: 35
LYMPHOCYTES # BLD: 0.5 X10^3/UL (ref 1–4.8)
LYMPHOCYTES NFR BLD AUTO: 10 % (ref 24–48)
MAGNESIUM SERPL-MCNC: 2.2 MG/DL (ref 1.8–2.4)
MCH RBC QN AUTO: 32 PG (ref 25–35)
MCHC RBC AUTO-ENTMCNC: 33 G/DL (ref 31–37)
MCV RBC AUTO: 98 FL (ref 79–100)
MONO #: 0.6 X10^3/UL (ref 0–1.1)
MONOCYTES NFR BLD: 12 % (ref 0–9)
NEUT #: 4 X10^3/UL (ref 1.8–7.7)
NEUTROPHILS NFR BLD AUTO: 76 % (ref 31–73)
PCO2 BLDA: 38 MMHG (ref 35–46)
PLATELET # BLD AUTO: 318 X10^3/UL (ref 140–400)
PO2 BLDA: 74 MMHG (ref 75–108)
POTASSIUM SERPL-SCNC: 3.6 MMOL/L (ref 3.5–5.1)
RBC # BLD AUTO: 2.48 X10^6/UL (ref 3.5–5.4)
SAO2 % BLDA: 94 % (ref 92–99)
SODIUM SERPL-SCNC: 135 MMOL/L (ref 136–145)
TRIGL SERPL-MCNC: 245 MG/DL (ref 0–150)
WBC # BLD AUTO: 5.3 X10^3/UL (ref 4–11)

## 2020-04-13 RX ADMIN — DEXMEDETOMIDINE HYDROCHLORIDE PRN MLS/HR: 100 INJECTION, SOLUTION, CONCENTRATE INTRAVENOUS at 22:31

## 2020-04-13 RX ADMIN — Medication PRN APP: at 08:19

## 2020-04-13 RX ADMIN — DEXTROSE SCH MLS/HR: 50 INJECTION, SOLUTION INTRAVENOUS at 14:06

## 2020-04-13 RX ADMIN — DEXMEDETOMIDINE HYDROCHLORIDE PRN MLS/HR: 100 INJECTION, SOLUTION, CONCENTRATE INTRAVENOUS at 15:49

## 2020-04-13 RX ADMIN — INSULIN LISPRO SCH UNITS: 100 INJECTION, SOLUTION INTRAVENOUS; SUBCUTANEOUS at 14:14

## 2020-04-13 RX ADMIN — Medication PRN EACH: at 13:34

## 2020-04-13 RX ADMIN — HEPARIN SODIUM SCH UNIT: 5000 INJECTION, SOLUTION INTRAVENOUS; SUBCUTANEOUS at 21:55

## 2020-04-13 RX ADMIN — DEXMEDETOMIDINE HYDROCHLORIDE PRN MLS/HR: 100 INJECTION, SOLUTION, CONCENTRATE INTRAVENOUS at 03:03

## 2020-04-13 RX ADMIN — INSULIN LISPRO SCH UNITS: 100 INJECTION, SOLUTION INTRAVENOUS; SUBCUTANEOUS at 06:12

## 2020-04-13 RX ADMIN — DEXMEDETOMIDINE HYDROCHLORIDE PRN MLS/HR: 100 INJECTION, SOLUTION, CONCENTRATE INTRAVENOUS at 19:24

## 2020-04-13 RX ADMIN — DEXMEDETOMIDINE HYDROCHLORIDE PRN MLS/HR: 100 INJECTION, SOLUTION, CONCENTRATE INTRAVENOUS at 06:04

## 2020-04-13 RX ADMIN — MEROPENEM SCH MLS/HR: 500 INJECTION, POWDER, FOR SOLUTION INTRAVENOUS at 14:06

## 2020-04-13 RX ADMIN — DEXMEDETOMIDINE HYDROCHLORIDE PRN MLS/HR: 100 INJECTION, SOLUTION, CONCENTRATE INTRAVENOUS at 09:30

## 2020-04-13 RX ADMIN — HEPARIN SODIUM SCH UNIT: 5000 INJECTION, SOLUTION INTRAVENOUS; SUBCUTANEOUS at 08:14

## 2020-04-13 RX ADMIN — INSULIN LISPRO SCH UNITS: 100 INJECTION, SOLUTION INTRAVENOUS; SUBCUTANEOUS at 00:12

## 2020-04-13 RX ADMIN — INSULIN LISPRO SCH UNITS: 100 INJECTION, SOLUTION INTRAVENOUS; SUBCUTANEOUS at 18:29

## 2020-04-13 RX ADMIN — MEROPENEM SCH MLS/HR: 500 INJECTION, POWDER, FOR SOLUTION INTRAVENOUS at 22:03

## 2020-04-13 RX ADMIN — PANTOPRAZOLE SODIUM SCH MG: 40 INJECTION, POWDER, FOR SOLUTION INTRAVENOUS at 08:12

## 2020-04-13 NOTE — PDOC
Renal-Progress Notes


Subjective Notes


Notes


NONE





History of Present Illness


Hx of present illness


INTUBATED





Vitals


Vitals





Vital Signs








  Date Time  Temp Pulse Resp B/P (MAP) Pulse Ox O2 Delivery O2 Flow Rate FiO2


 


4/13/20 09:00  109 25 131/78 (95) 98 Ventilator  


 


4/13/20 07:00 98.3       





 98.3       








Weight


Weight [ ]





I.O.


Intake and Output











Intake and Output 


 


 4/13/20





 07:00


 


Intake Total 2729.0 ml


 


Output Total 966 ml


 


Balance 1763.0 ml


 


 


 


Intake Oral 0 ml


 


IV Total 2729.0 ml


 


Output Urine Total 366 ml


 


Stool Total 100 ml


 


Gastric Drainage Total 500 ml











Labs


Labs





Laboratory Tests








Test


 4/12/20


12:00 4/12/20


12:42 4/12/20


17:53 4/13/20


00:02


 


Urine Collection Type Unknown    


 


Urine Color Brown    


 


Urine Clarity Cloudy    


 


Urine pH 5.0 (<5.0-8.0)    


 


Urine Specific Gravity


 >=1.030


(1.000-1.030) 


 


 





 


Urine Protein


 100 mg/dL


(NEG-TRACE) 


 


 





 


Urine Glucose (UA)


 100 mg/dL


(NEG) 


 


 





 


Urine Ketones (Stick)


 Trace mg/dL


(NEG) 


 


 





 


Urine Blood Large (NEG)    


 


Urine Nitrite Positive (NEG)    


 


Urine Bilirubin Small (NEG)    


 


Urine Urobilinogen Dipstick


 1.0 mg/dL (0.2


mg/dL) 


 


 





 


Urine Leukocyte Esterase Small (NEG)    


 


Urine RBC >40 /HPF (0-2)    


 


Urine WBC 1-4 /HPF (0-4)    


 


Urine Squamous Epithelial


Cells Occ /LPF 


 


 


 





 


Urine Transitional Epithelial


Cells Few /LPF 


 


 


 





 


Urine Amorphous Sediment Present /HPF    


 


Urine Bacteria


 Few /HPF


(0-FEW) 


 


 





 


Urine Hyaline Casts Few /HPF    


 


Urine Mucus Slight /LPF    


 


Glucose (Fingerstick)


 


 223 mg/dL


(70-99) 166 mg/dL


(70-99) 201 mg/dL


(70-99)


 


Test


 4/13/20


06:09 4/13/20


06:10 


 





 


Glucose (Fingerstick)


 175 mg/dL


(70-99) 


 


 





 


White Blood Count


 


 5.3 x10^3/uL


(4.0-11.0) 


 





 


Red Blood Count


 


 2.48 x10^6/uL


(3.50-5.40) 


 





 


Hemoglobin


 


 8.0 g/dL


(12.0-15.5) 


 





 


Hematocrit


 


 24.3 %


(36.0-47.0) 


 





 


Mean Corpuscular Volume  98 fL ()   


 


Mean Corpuscular Hemoglobin  32 pg (25-35)   


 


Mean Corpuscular Hemoglobin


Concent 


 33 g/dL


(31-37) 


 





 


Red Cell Distribution Width


 


 19.3 %


(11.5-14.5) 


 





 


Platelet Count


 


 318 x10^3/uL


(140-400) 


 





 


Neutrophils (%) (Auto)  76 % (31-73)   


 


Lymphocytes (%) (Auto)  10 % (24-48)   


 


Monocytes (%) (Auto)  12 % (0-9)   


 


Eosinophils (%) (Auto)  2 % (0-3)   


 


Basophils (%) (Auto)  0 % (0-3)   


 


Neutrophils # (Auto)


 


 4.0 x10^3/uL


(1.8-7.7) 


 





 


Lymphocytes # (Auto)


 


 0.5 x10^3/uL


(1.0-4.8) 


 





 


Monocytes # (Auto)


 


 0.6 x10^3/uL


(0.0-1.1) 


 





 


Eosinophils # (Auto)


 


 0.1 x10^3/uL


(0.0-0.7) 


 





 


Basophils # (Auto)


 


 0.0 x10^3/uL


(0.0-0.2) 


 





 


Sodium Level


 


 135 mmol/L


(136-145) 


 





 


Potassium Level


 


 3.6 mmol/L


(3.5-5.1) 


 





 


Chloride Level


 


 98 mmol/L


() 


 





 


Carbon Dioxide Level


 


 23 mmol/L


(21-32) 


 





 


Anion Gap  14 (6-14)   


 


Blood Urea Nitrogen


 


 80 mg/dL


(7-20) 


 





 


Creatinine


 


 2.1 mg/dL


(0.6-1.0) 


 





 


Estimated GFR


(Cockcroft-Gault) 


 25.0 


 


 





 


Glucose Level


 


 167 mg/dL


(70-99) 


 





 


Calcium Level


 


 9.0 mg/dL


(8.5-10.1) 


 





 


Magnesium Level


 


 2.2 mg/dL


(1.8-2.4) 


 





 


Triglycerides Level


 


 245 mg/dL


(0-150) 


 














Micro


Micro





Microbiology


4/10/20 Blood Culture - Preliminary, Resulted


          NO GROWTH AFTER 2 DAYS


4/4/20 Urine Culture - Final, Complete


         


4/4/20 Urine Culture Result 1 ([facility]) - Final, Complete





Review of Systems


Constitutional:  yes: other (ON THE VENT)





Physical Exam


General Appearance:  other (ON THE VENT)


Skin:  warm


Respiratory:  decreased breath sounds


Heart:  S1S2


Abdomen:  soft, bowel sounds present


Genitourinary:  bladder flat


Extremities:  pulses present, edema


Neurology:  other (SEDATED)





Assessment


Assessment


IMP





[id number] CRRT


ANEMIA


LEUCOCYTOSIS-IMPROVING


ANASARCA DUE TO 3RD SPACING


HYPERKALEMIA-RESOLVED


ACIDOSIS AND ACIDEMIA-CONTROLLED


ACUTE REPS FAILURE


ACUTE PANCREATITIS


HYPOALBUMINEMIA


HYPOCALCEMIA-FROM SAPONIFICATION-BETTER


POOR HD CATHETER FUNCTION





PLAN








HD TODAY 


UF TO DW 


TPN TO CONTINUE AS NEEDED


PRBC AS NEEDED


TREAT LOW CA ONLY FOR ARRHYTHMIA OR SYMPTOMS


CONT YOLI


VENT SUPPORT


PRESSORS AS NEEDED


ANTIBIOTICS


WILL FOLLOW


VERY POOR PROGNOSIS











BETH HEIN MD                 Apr 13, 2020 11:25

## 2020-04-13 NOTE — PDOC
Infectious Disease Note


Subjective


Subjective


Sedated


Trach, vent FiO2 35 % PEEP 5


Now off levaphed 


Fever Tmax 100


+ diarrhea 


TPN





ROS


ROS


unable to obtain





Vital Sign


Vital Signs





Vital Signs








  Date Time  Temp Pulse Resp B/P (MAP) Pulse Ox O2 Delivery O2 Flow Rate FiO2


 


4/13/20 07:09   18  99 Ventilator  


 


4/13/20 06:00  86  113/71 (85)    


 


4/13/20 04:00 98.8       





 98.8       











Physical Exam


PHYSICAL EXAM


GENERAL: Alert but not resposive generalized anasarca 


HEENT: Pupils equal, + NGT, oral cavity dry 


NECK:  Trach/vent 


LUNGS: Diminished aeration 


HEART:  S1, S2, regular 


ABDOMEN:  Distended, hypoactive BS, Rectal tube in place 


: [place]  (3/17)


EXTREMITIES: Generalized edema, no cyanosis, SCDs bilaterally 


DERMATOLOGIC:  Warm and dry.  No generalized rash.  


CENTRAL NERVOUS SYSTEM: Opens eyes to tactile stimuli


HDC, LIJ(4/6) and RUE-PICC without signs of complications





Labs


Lab





Laboratory Tests








Test


 4/12/20


08:00 4/12/20


12:00 4/12/20


12:42 4/12/20


17:53


 


O2 Saturation 97 % (92-99)    


 


Arterial Blood pH


 7.47


(7.35-7.45) 


 


 





 


Arterial Blood pCO2 at


Patient Temp 34 mmHg


(35-46) 


 


 





 


Arterial Blood pO2 at Patient


Temp 95 mmHg


() 


 


 





 


Arterial Blood HCO3


 24 mmol/L


(21-28) 


 


 





 


Arterial Blood Base Excess


 1 mmol/L


(-3-3) 


 


 





 


FiO2 35    


 


Urine Collection Type  Unknown   


 


Urine Color  Brown   


 


Urine Clarity  Cloudy   


 


Urine pH  5.0 (<5.0-8.0)   


 


Urine Specific Gravity


 


 >=1.030


(1.000-1.030) 


 





 


Urine Protein


 


 100 mg/dL


(NEG-TRACE) 


 





 


Urine Glucose (UA)


 


 100 mg/dL


(NEG) 


 





 


Urine Ketones (Stick)


 


 Trace mg/dL


(NEG) 


 





 


Urine Blood  Large (NEG)   


 


Urine Nitrite  Positive (NEG)   


 


Urine Bilirubin  Small (NEG)   


 


Urine Urobilinogen Dipstick


 


 1.0 mg/dL (0.2


mg/dL) 


 





 


Urine Leukocyte Esterase  Small (NEG)   


 


Urine RBC  >40 /HPF (0-2)   


 


Urine WBC  1-4 /HPF (0-4)   


 


Urine Squamous Epithelial


Cells 


 Occ /LPF 


 


 





 


Urine Transitional Epithelial


Cells 


 Few /LPF 


 


 





 


Urine Amorphous Sediment  Present /HPF   


 


Urine Bacteria


 


 Few /HPF


(0-FEW) 


 





 


Urine Hyaline Casts  Few /HPF   


 


Urine Mucus  Slight /LPF   


 


Glucose (Fingerstick)


 


 


 223 mg/dL


(70-99) 166 mg/dL


(70-99)


 


Test


 4/13/20


00:02 4/13/20


06:09 4/13/20


06:10 





 


Glucose (Fingerstick)


 201 mg/dL


(70-99) 175 mg/dL


(70-99) 


 





 


White Blood Count


 


 


 5.3 x10^3/uL


(4.0-11.0) 





 


Red Blood Count


 


 


 2.48 x10^6/uL


(3.50-5.40) 





 


Hemoglobin


 


 


 8.0 g/dL


(12.0-15.5) 





 


Hematocrit


 


 


 24.3 %


(36.0-47.0) 





 


Mean Corpuscular Volume   98 fL ()  


 


Mean Corpuscular Hemoglobin   32 pg (25-35)  


 


Mean Corpuscular Hemoglobin


Concent 


 


 33 g/dL


(31-37) 





 


Red Cell Distribution Width


 


 


 19.3 %


(11.5-14.5) 





 


Platelet Count


 


 


 318 x10^3/uL


(140-400) 





 


Neutrophils (%) (Auto)   76 % (31-73)  


 


Lymphocytes (%) (Auto)   10 % (24-48)  


 


Monocytes (%) (Auto)   12 % (0-9)  


 


Eosinophils (%) (Auto)   2 % (0-3)  


 


Basophils (%) (Auto)   0 % (0-3)  


 


Neutrophils # (Auto)


 


 


 4.0 x10^3/uL


(1.8-7.7) 





 


Lymphocytes # (Auto)


 


 


 0.5 x10^3/uL


(1.0-4.8) 





 


Monocytes # (Auto)


 


 


 0.6 x10^3/uL


(0.0-1.1) 





 


Eosinophils # (Auto)


 


 


 0.1 x10^3/uL


(0.0-0.7) 





 


Basophils # (Auto)


 


 


 0.0 x10^3/uL


(0.0-0.2) 





 


Sodium Level


 


 


 135 mmol/L


(136-145) 





 


Potassium Level


 


 


 3.6 mmol/L


(3.5-5.1) 





 


Chloride Level


 


 


 98 mmol/L


() 





 


Carbon Dioxide Level


 


 


 23 mmol/L


(21-32) 





 


Anion Gap   14 (6-14)  


 


Blood Urea Nitrogen


 


 


 80 mg/dL


(7-20) 





 


Creatinine


 


 


 2.1 mg/dL


(0.6-1.0) 





 


Estimated GFR


(Cockcroft-Gault) 


 


 25.0 


 





 


Glucose Level


 


 


 167 mg/dL


(70-99) 





 


Calcium Level


 


 


 9.0 mg/dL


(8.5-10.1) 





 


Magnesium Level


 


 


 2.2 mg/dL


(1.8-2.4) 





 


Triglycerides Level


 


 


 245 mg/dL


(0-150) 











Micro


CT A/P, 4/9


IMPRESSION:


1. Increased ascites.


2. 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


at the pancreas


3. Cholelithiasis with thickening of the gallbladder wall.


4. Persistent pleural effusions and atelectasis in the lung bases.





Objective


Assessment





Leukocytosis 4/10 -improved 


Fever -blood cults 4/10 neg.


Severe acute gallstone pancreatitis with necrosis


   -CT 4/9 necrotizing pancreatitis with fluid and phlegmon at the pancreas


   -not a surgical candidate at this time


Hypotension on levaphed 


JED requiring HD 


   - s/p RIJ temporary dialysis catheter replacement, 4/2


Vent dependent respiratory failure


   -s/p Trach placement 


   -lung opacities, COVID-19 neg 


Anasarca - worse


Anemia - S/p PRBC 3/26


Hypocalcemia 


Prediabetes


HTN


Diarrhea, C. diff neg 3/23


Anemia - S/p PRBCs





Plan


Plan of Care


cont merrem (4/8) and Zyvox (4/10) and micafungin 


-previously on cefepime, flagyl & dapto 


Gen surgery following


f/u BC from 4/9 neg to date 


F/u urine cult


Maintain aspiration precautions


HD today








Critically ill








D/W RASHAWN HERNANDEZ MD              Apr 13, 2020 07:47

## 2020-04-13 NOTE — PDOC
PULMONARY PROGRESS NOTES


Subjective


Patient intubated on 3/23 , s/p trach


remained on assist control


starting to track


Vitals





Vital Signs








  Date Time  Temp Pulse Resp B/P (MAP) Pulse Ox O2 Delivery O2 Flow Rate FiO2


 


4/13/20 07:09   18  99 Ventilator  


 


4/13/20 07:00 98.3 94  93/53 (66)    





 98.3       








Comments


Trach/opens eyes


Lungs:  Other (dimished in BLL)


Cardiovascular:  S1, S2


Abdomen:  Non-tender, Other (distended)


Extremities:  Other (+3 generalized edema )


Skin:  Warm, Dry


Labs





Laboratory Tests








Test


 4/11/20


08:00 4/11/20


15:33 4/11/20


17:30 4/12/20


00:08


 


O2 Saturation 94 % (92-99)    


 


Arterial Blood pH


 7.38


(7.35-7.45) 


 


 





 


Arterial Blood pCO2 at


Patient Temp 36 mmHg


(35-46) 


 


 





 


Arterial Blood pO2 at Patient


Temp 75 mmHg


() 


 


 





 


Arterial Blood HCO3


 21 mmol/L


(21-28) 


 


 





 


Arterial Blood Base Excess


 -4 mmol/L


(-3-3) 


 


 





 


FiO2 35    


 


Glucose (Fingerstick)


 


 214 mg/dL


(70-99) 


 208 mg/dL


(70-99)


 


Hemoglobin A1c


 


 


 5.4 %


(4.8-5.6) 





 


Test


 4/12/20


06:00 4/12/20


08:00 4/12/20


12:00 4/12/20


12:42


 


White Blood Count


 6.6 x10^3/uL


(4.0-11.0) 


 


 





 


Red Blood Count


 2.09 x10^6/uL


(3.50-5.40) 


 


 





 


Hemoglobin


 6.8 g/dL


(12.0-15.5) 


 


 





 


Hematocrit


 21.0 %


(36.0-47.0) 


 


 





 


Mean Corpuscular Volume


 100 fL


() 


 


 





 


Mean Corpuscular Hemoglobin 33 pg (25-35)    


 


Mean Corpuscular Hemoglobin


Concent 33 g/dL


(31-37) 


 


 





 


Red Cell Distribution Width


 22.2 %


(11.5-14.5) 


 


 





 


Platelet Count


 287 x10^3/uL


(140-400) 


 


 





 


Neutrophils (%) (Auto) 74 % (31-73)    


 


Lymphocytes (%) (Auto) 10 % (24-48)    


 


Monocytes (%) (Auto) 11 % (0-9)    


 


Eosinophils (%) (Auto) 4 % (0-3)    


 


Basophils (%) (Auto) 1 % (0-3)    


 


Neutrophils # (Auto)


 4.9 x10^3/uL


(1.8-7.7) 


 


 





 


Lymphocytes # (Auto)


 0.6 x10^3/uL


(1.0-4.8) 


 


 





 


Monocytes # (Auto)


 0.7 x10^3/uL


(0.0-1.1) 


 


 





 


Eosinophils # (Auto)


 0.3 x10^3/uL


(0.0-0.7) 


 


 





 


Basophils # (Auto)


 0.0 x10^3/uL


(0.0-0.2) 


 


 





 


Sodium Level


 135 mmol/L


(136-145) 


 


 





 


Potassium Level


 3.6 mmol/L


(3.5-5.1) 


 


 





 


Chloride Level


 99 mmol/L


() 


 


 





 


Carbon Dioxide Level


 29 mmol/L


(21-32) 


 


 





 


Anion Gap 7 (6-14)    


 


Blood Urea Nitrogen


 56 mg/dL


(7-20) 


 


 





 


Creatinine


 1.9 mg/dL


(0.6-1.0) 


 


 





 


Estimated GFR


(Cockcroft-Gault) 28.1 


 


 


 





 


Glucose Level


 174 mg/dL


(70-99) 


 


 





 


Glucose (Fingerstick)


 172 mg/dL


(70-99) 


 


 223 mg/dL


(70-99)


 


Calcium Level


 8.9 mg/dL


(8.5-10.1) 


 


 





 


Phosphorus Level


 2.9 mg/dL


(2.6-4.7) 


 


 





 


Albumin


 2.9 g/dL


(3.4-5.0) 


 


 





 


O2 Saturation  97 % (92-99)   


 


Arterial Blood pH


 


 7.47


(7.35-7.45) 


 





 


Arterial Blood pCO2 at


Patient Temp 


 34 mmHg


(35-46) 


 





 


Arterial Blood pO2 at Patient


Temp 


 95 mmHg


() 


 





 


Arterial Blood HCO3


 


 24 mmol/L


(21-28) 


 





 


Arterial Blood Base Excess


 


 1 mmol/L


(-3-3) 


 





 


FiO2  35   


 


Urine Collection Type   Unknown  


 


Urine Color   Brown  


 


Urine Clarity   Cloudy  


 


Urine pH   5.0 (<5.0-8.0)  


 


Urine Specific Gravity


 


 


 >=1.030


(1.000-1.030) 





 


Urine Protein


 


 


 100 mg/dL


(NEG-TRACE) 





 


Urine Glucose (UA)


 


 


 100 mg/dL


(NEG) 





 


Urine Ketones (Stick)


 


 


 Trace mg/dL


(NEG) 





 


Urine Blood   Large (NEG)  


 


Urine Nitrite   Positive (NEG)  


 


Urine Bilirubin   Small (NEG)  


 


Urine Urobilinogen Dipstick


 


 


 1.0 mg/dL (0.2


mg/dL) 





 


Urine Leukocyte Esterase   Small (NEG)  


 


Urine RBC   >40 /HPF (0-2)  


 


Urine WBC   1-4 /HPF (0-4)  


 


Urine Squamous Epithelial


Cells 


 


 Occ /LPF 


 





 


Urine Transitional Epithelial


Cells 


 


 Few /LPF 


 





 


Urine Amorphous Sediment   Present /HPF  


 


Urine Bacteria


 


 


 Few /HPF


(0-FEW) 





 


Urine Hyaline Casts   Few /HPF  


 


Urine Mucus   Slight /LPF  


 


Test


 4/12/20


17:53 4/13/20


00:02 4/13/20


06:09 4/13/20


06:10


 


Glucose (Fingerstick)


 166 mg/dL


(70-99) 201 mg/dL


(70-99) 175 mg/dL


(70-99) 





 


White Blood Count


 


 


 


 5.3 x10^3/uL


(4.0-11.0)


 


Red Blood Count


 


 


 


 2.48 x10^6/uL


(3.50-5.40)


 


Hemoglobin


 


 


 


 8.0 g/dL


(12.0-15.5)


 


Hematocrit


 


 


 


 24.3 %


(36.0-47.0)


 


Mean Corpuscular Volume    98 fL () 


 


Mean Corpuscular Hemoglobin    32 pg (25-35) 


 


Mean Corpuscular Hemoglobin


Concent 


 


 


 33 g/dL


(31-37)


 


Red Cell Distribution Width


 


 


 


 19.3 %


(11.5-14.5)


 


Platelet Count


 


 


 


 318 x10^3/uL


(140-400)


 


Neutrophils (%) (Auto)    76 % (31-73) 


 


Lymphocytes (%) (Auto)    10 % (24-48) 


 


Monocytes (%) (Auto)    12 % (0-9) 


 


Eosinophils (%) (Auto)    2 % (0-3) 


 


Basophils (%) (Auto)    0 % (0-3) 


 


Neutrophils # (Auto)


 


 


 


 4.0 x10^3/uL


(1.8-7.7)


 


Lymphocytes # (Auto)


 


 


 


 0.5 x10^3/uL


(1.0-4.8)


 


Monocytes # (Auto)


 


 


 


 0.6 x10^3/uL


(0.0-1.1)


 


Eosinophils # (Auto)


 


 


 


 0.1 x10^3/uL


(0.0-0.7)


 


Basophils # (Auto)


 


 


 


 0.0 x10^3/uL


(0.0-0.2)


 


Sodium Level


 


 


 


 135 mmol/L


(136-145)


 


Potassium Level


 


 


 


 3.6 mmol/L


(3.5-5.1)


 


Chloride Level


 


 


 


 98 mmol/L


()


 


Carbon Dioxide Level


 


 


 


 23 mmol/L


(21-32)


 


Anion Gap    14 (6-14) 


 


Blood Urea Nitrogen


 


 


 


 80 mg/dL


(7-20)


 


Creatinine


 


 


 


 2.1 mg/dL


(0.6-1.0)


 


Estimated GFR


(Cockcroft-Gault) 


 


 


 25.0 





 


Glucose Level


 


 


 


 167 mg/dL


(70-99)


 


Calcium Level


 


 


 


 9.0 mg/dL


(8.5-10.1)


 


Magnesium Level


 


 


 


 2.2 mg/dL


(1.8-2.4)


 


Triglycerides Level


 


 


 


 245 mg/dL


(0-150)








Laboratory Tests








Test


 4/12/20


08:00 4/12/20


12:00 4/12/20


12:42 4/12/20


17:53


 


O2 Saturation 97 % (92-99)    


 


Arterial Blood pH


 7.47


(7.35-7.45) 


 


 





 


Arterial Blood pCO2 at


Patient Temp 34 mmHg


(35-46) 


 


 





 


Arterial Blood pO2 at Patient


Temp 95 mmHg


() 


 


 





 


Arterial Blood HCO3


 24 mmol/L


(21-28) 


 


 





 


Arterial Blood Base Excess


 1 mmol/L


(-3-3) 


 


 





 


FiO2 35    


 


Urine Collection Type  Unknown   


 


Urine Color  Brown   


 


Urine Clarity  Cloudy   


 


Urine pH  5.0 (<5.0-8.0)   


 


Urine Specific Gravity


 


 >=1.030


(1.000-1.030) 


 





 


Urine Protein


 


 100 mg/dL


(NEG-TRACE) 


 





 


Urine Glucose (UA)


 


 100 mg/dL


(NEG) 


 





 


Urine Ketones (Stick)


 


 Trace mg/dL


(NEG) 


 





 


Urine Blood  Large (NEG)   


 


Urine Nitrite  Positive (NEG)   


 


Urine Bilirubin  Small (NEG)   


 


Urine Urobilinogen Dipstick


 


 1.0 mg/dL (0.2


mg/dL) 


 





 


Urine Leukocyte Esterase  Small (NEG)   


 


Urine RBC  >40 /HPF (0-2)   


 


Urine WBC  1-4 /HPF (0-4)   


 


Urine Squamous Epithelial


Cells 


 Occ /LPF 


 


 





 


Urine Transitional Epithelial


Cells 


 Few /LPF 


 


 





 


Urine Amorphous Sediment  Present /HPF   


 


Urine Bacteria


 


 Few /HPF


(0-FEW) 


 





 


Urine Hyaline Casts  Few /HPF   


 


Urine Mucus  Slight /LPF   


 


Glucose (Fingerstick)


 


 


 223 mg/dL


(70-99) 166 mg/dL


(70-99)


 


Test


 4/13/20


00:02 4/13/20


06:09 4/13/20


06:10 





 


Glucose (Fingerstick)


 201 mg/dL


(70-99) 175 mg/dL


(70-99) 


 





 


White Blood Count


 


 


 5.3 x10^3/uL


(4.0-11.0) 





 


Red Blood Count


 


 


 2.48 x10^6/uL


(3.50-5.40) 





 


Hemoglobin


 


 


 8.0 g/dL


(12.0-15.5) 





 


Hematocrit


 


 


 24.3 %


(36.0-47.0) 





 


Mean Corpuscular Volume   98 fL ()  


 


Mean Corpuscular Hemoglobin   32 pg (25-35)  


 


Mean Corpuscular Hemoglobin


Concent 


 


 33 g/dL


(31-37) 





 


Red Cell Distribution Width


 


 


 19.3 %


(11.5-14.5) 





 


Platelet Count


 


 


 318 x10^3/uL


(140-400) 





 


Neutrophils (%) (Auto)   76 % (31-73)  


 


Lymphocytes (%) (Auto)   10 % (24-48)  


 


Monocytes (%) (Auto)   12 % (0-9)  


 


Eosinophils (%) (Auto)   2 % (0-3)  


 


Basophils (%) (Auto)   0 % (0-3)  


 


Neutrophils # (Auto)


 


 


 4.0 x10^3/uL


(1.8-7.7) 





 


Lymphocytes # (Auto)


 


 


 0.5 x10^3/uL


(1.0-4.8) 





 


Monocytes # (Auto)


 


 


 0.6 x10^3/uL


(0.0-1.1) 





 


Eosinophils # (Auto)


 


 


 0.1 x10^3/uL


(0.0-0.7) 





 


Basophils # (Auto)


 


 


 0.0 x10^3/uL


(0.0-0.2) 





 


Sodium Level


 


 


 135 mmol/L


(136-145) 





 


Potassium Level


 


 


 3.6 mmol/L


(3.5-5.1) 





 


Chloride Level


 


 


 98 mmol/L


() 





 


Carbon Dioxide Level


 


 


 23 mmol/L


(21-32) 





 


Anion Gap   14 (6-14)  


 


Blood Urea Nitrogen


 


 


 80 mg/dL


(7-20) 





 


Creatinine


 


 


 2.1 mg/dL


(0.6-1.0) 





 


Estimated GFR


(Cockcroft-Gault) 


 


 25.0 


 





 


Glucose Level


 


 


 167 mg/dL


(70-99) 





 


Calcium Level


 


 


 9.0 mg/dL


(8.5-10.1) 





 


Magnesium Level


 


 


 2.2 mg/dL


(1.8-2.4) 





 


Triglycerides Level


 


 


 245 mg/dL


(0-150) 











Medications





Active Scripts








 Medications  Dose


 Route/Sig


 Max Daily Dose Days Date Category


 


 Bisoprolol


 Fumarate 5 Mg


 Tablet  10 Mg


 PO DAILY


   3/16/20 Reported








Comments


Chest x-ray reviewed 4/13





IMPRESSION: 


1. Increased moderate to large bilateral posteriorly layering pleural 


effusions with associated atelectasis on right .





Impression


.


IMPRESSION:


1.  Acute hypoxemic respiratory failure secondary to ARDS status post trach,


2.  Gallstone pancreatitis.with NECROSIS


3.  Severe metabolic acidosis.stable


4.  Acute kidney injury-stable


5.  Acute gallstone pancreatitis.


6.  Hypoalbuminemia.


7.  Hypocalcemia.


8.  Leukocytosis


9.  Chronic anemia


10. Covid 19 testing negative


11. Acute /chronic anemia suspected hemorrhagic fluid in pelvis


12, Fever per ID





Plan


.


CPAP trial as tolerated/ dc all sedation


TS in few days


extra lasix today. May need right thoracentesis


HD in am


symptomatic treatment of Fever 


follow surgery and ID rec


Discussed with RN and RT.  


Transfuse prn, check H/H -


Antibiotics per ID, 


Follow nephrology recs 


Nutritional support with TPN


off pressors


Prognosis is guarded





DVT/GI PPX 


d/w RN/RT








CC time : 30 minutes reviewing labs, patient care and discussing will care team











SHARYN SOLORZANO MD                 Apr 13, 2020 07:51

## 2020-04-13 NOTE — NUR
pt had dialysis this am. returned to icu room at 1400. pt started on cpap trial. pt became 
fatiqued, decreased tidal volumes to 250- 275. abg completed and pt returned to original 
vent settings.

pt sitting in a chair position in bed after trach care and oral care completed. pt tolerated 
well with precedex continued at 1 mcg/kg/min for anxiety and fentanyl for pain with her 
acute pancreatitis at 75 mcg/hour.

pt tracking with eyes and able to lift arms off of bed about 4 inches. pt wiggled feet on 
command.

## 2020-04-13 NOTE — PDOC
SAURAV NASH APRN 4/13/20 0930:


SURGICAL PROGRESS NOTE


Subjective


seen during dialysis


Vital Signs





Vital Signs








  Date Time  Temp Pulse Resp B/P (MAP) Pulse Ox O2 Delivery O2 Flow Rate FiO2


 


4/13/20 08:30     100 Ventilator  


 


4/13/20 08:00  122 28 125/71 (89)    


 


4/13/20 07:00 98.3       





 98.3       








I&O











Intake and Output 


 


 4/13/20





 07:00


 


Intake Total 2729.0 ml


 


Output Total 966 ml


 


Balance 1763.0 ml


 


 


 


Intake Oral 0 ml


 


IV Total 2729.0 ml


 


Output Urine Total 366 ml


 


Stool Total 100 ml


 


Gastric Drainage Total 500 ml








General:  Other (eyes open)


HEENT:  Other (trach in place)


Abdomen:  Soft, Other (distended )


Labs





Laboratory Tests








Test


 4/11/20


15:33 4/11/20


17:30 4/12/20


00:08 4/12/20


06:00


 


Glucose (Fingerstick)


 214 mg/dL


(70-99) 


 208 mg/dL


(70-99) 172 mg/dL


(70-99)


 


Hemoglobin A1c


 


 5.4 %


(4.8-5.6) 


 





 


White Blood Count


 


 


 


 6.6 x10^3/uL


(4.0-11.0)


 


Red Blood Count


 


 


 


 2.09 x10^6/uL


(3.50-5.40)


 


Hemoglobin


 


 


 


 6.8 g/dL


(12.0-15.5)


 


Hematocrit


 


 


 


 21.0 %


(36.0-47.0)


 


Mean Corpuscular Volume


 


 


 


 100 fL


()


 


Mean Corpuscular Hemoglobin    33 pg (25-35) 


 


Mean Corpuscular Hemoglobin


Concent 


 


 


 33 g/dL


(31-37)


 


Red Cell Distribution Width


 


 


 


 22.2 %


(11.5-14.5)


 


Platelet Count


 


 


 


 287 x10^3/uL


(140-400)


 


Neutrophils (%) (Auto)    74 % (31-73) 


 


Lymphocytes (%) (Auto)    10 % (24-48) 


 


Monocytes (%) (Auto)    11 % (0-9) 


 


Eosinophils (%) (Auto)    4 % (0-3) 


 


Basophils (%) (Auto)    1 % (0-3) 


 


Neutrophils # (Auto)


 


 


 


 4.9 x10^3/uL


(1.8-7.7)


 


Lymphocytes # (Auto)


 


 


 


 0.6 x10^3/uL


(1.0-4.8)


 


Monocytes # (Auto)


 


 


 


 0.7 x10^3/uL


(0.0-1.1)


 


Eosinophils # (Auto)


 


 


 


 0.3 x10^3/uL


(0.0-0.7)


 


Basophils # (Auto)


 


 


 


 0.0 x10^3/uL


(0.0-0.2)


 


Sodium Level


 


 


 


 135 mmol/L


(136-145)


 


Potassium Level


 


 


 


 3.6 mmol/L


(3.5-5.1)


 


Chloride Level


 


 


 


 99 mmol/L


()


 


Carbon Dioxide Level


 


 


 


 29 mmol/L


(21-32)


 


Anion Gap    7 (6-14) 


 


Blood Urea Nitrogen


 


 


 


 56 mg/dL


(7-20)


 


Creatinine


 


 


 


 1.9 mg/dL


(0.6-1.0)


 


Estimated GFR


(Cockcroft-Gault) 


 


 


 28.1 





 


Glucose Level


 


 


 


 174 mg/dL


(70-99)


 


Calcium Level


 


 


 


 8.9 mg/dL


(8.5-10.1)


 


Phosphorus Level


 


 


 


 2.9 mg/dL


(2.6-4.7)


 


Albumin


 


 


 


 2.9 g/dL


(3.4-5.0)


 


Test


 4/12/20


08:00 4/12/20


12:00 4/12/20


12:42 4/12/20


17:53


 


O2 Saturation 97 % (92-99)    


 


Arterial Blood pH


 7.47


(7.35-7.45) 


 


 





 


Arterial Blood pCO2 at


Patient Temp 34 mmHg


(35-46) 


 


 





 


Arterial Blood pO2 at Patient


Temp 95 mmHg


() 


 


 





 


Arterial Blood HCO3


 24 mmol/L


(21-28) 


 


 





 


Arterial Blood Base Excess


 1 mmol/L


(-3-3) 


 


 





 


FiO2 35    


 


Urine Collection Type  Unknown   


 


Urine Color  Brown   


 


Urine Clarity  Cloudy   


 


Urine pH  5.0 (<5.0-8.0)   


 


Urine Specific Gravity


 


 >=1.030


(1.000-1.030) 


 





 


Urine Protein


 


 100 mg/dL


(NEG-TRACE) 


 





 


Urine Glucose (UA)


 


 100 mg/dL


(NEG) 


 





 


Urine Ketones (Stick)


 


 Trace mg/dL


(NEG) 


 





 


Urine Blood  Large (NEG)   


 


Urine Nitrite  Positive (NEG)   


 


Urine Bilirubin  Small (NEG)   


 


Urine Urobilinogen Dipstick


 


 1.0 mg/dL (0.2


mg/dL) 


 





 


Urine Leukocyte Esterase  Small (NEG)   


 


Urine RBC  >40 /HPF (0-2)   


 


Urine WBC  1-4 /HPF (0-4)   


 


Urine Squamous Epithelial


Cells 


 Occ /LPF 


 


 





 


Urine Transitional Epithelial


Cells 


 Few /LPF 


 


 





 


Urine Amorphous Sediment  Present /HPF   


 


Urine Bacteria


 


 Few /HPF


(0-FEW) 


 





 


Urine Hyaline Casts  Few /HPF   


 


Urine Mucus  Slight /LPF   


 


Glucose (Fingerstick)


 


 


 223 mg/dL


(70-99) 166 mg/dL


(70-99)


 


Test


 4/13/20


00:02 4/13/20


06:09 4/13/20


06:10 





 


Glucose (Fingerstick)


 201 mg/dL


(70-99) 175 mg/dL


(70-99) 


 





 


White Blood Count


 


 


 5.3 x10^3/uL


(4.0-11.0) 





 


Red Blood Count


 


 


 2.48 x10^6/uL


(3.50-5.40) 





 


Hemoglobin


 


 


 8.0 g/dL


(12.0-15.5) 





 


Hematocrit


 


 


 24.3 %


(36.0-47.0) 





 


Mean Corpuscular Volume   98 fL ()  


 


Mean Corpuscular Hemoglobin   32 pg (25-35)  


 


Mean Corpuscular Hemoglobin


Concent 


 


 33 g/dL


(31-37) 





 


Red Cell Distribution Width


 


 


 19.3 %


(11.5-14.5) 





 


Platelet Count


 


 


 318 x10^3/uL


(140-400) 





 


Neutrophils (%) (Auto)   76 % (31-73)  


 


Lymphocytes (%) (Auto)   10 % (24-48)  


 


Monocytes (%) (Auto)   12 % (0-9)  


 


Eosinophils (%) (Auto)   2 % (0-3)  


 


Basophils (%) (Auto)   0 % (0-3)  


 


Neutrophils # (Auto)


 


 


 4.0 x10^3/uL


(1.8-7.7) 





 


Lymphocytes # (Auto)


 


 


 0.5 x10^3/uL


(1.0-4.8) 





 


Monocytes # (Auto)


 


 


 0.6 x10^3/uL


(0.0-1.1) 





 


Eosinophils # (Auto)


 


 


 0.1 x10^3/uL


(0.0-0.7) 





 


Basophils # (Auto)


 


 


 0.0 x10^3/uL


(0.0-0.2) 





 


Sodium Level


 


 


 135 mmol/L


(136-145) 





 


Potassium Level


 


 


 3.6 mmol/L


(3.5-5.1) 





 


Chloride Level


 


 


 98 mmol/L


() 





 


Carbon Dioxide Level


 


 


 23 mmol/L


(21-32) 





 


Anion Gap   14 (6-14)  


 


Blood Urea Nitrogen


 


 


 80 mg/dL


(7-20) 





 


Creatinine


 


 


 2.1 mg/dL


(0.6-1.0) 





 


Estimated GFR


(Cockcroft-Gault) 


 


 25.0 


 





 


Glucose Level


 


 


 167 mg/dL


(70-99) 





 


Calcium Level


 


 


 9.0 mg/dL


(8.5-10.1) 





 


Magnesium Level


 


 


 2.2 mg/dL


(1.8-2.4) 





 


Triglycerides Level


 


 


 245 mg/dL


(0-150) 











Laboratory Tests








Test


 4/12/20


12:00 4/12/20


12:42 4/12/20


17:53 4/13/20


00:02


 


Urine Collection Type Unknown    


 


Urine Color Brown    


 


Urine Clarity Cloudy    


 


Urine pH 5.0 (<5.0-8.0)    


 


Urine Specific Gravity


 >=1.030


(1.000-1.030) 


 


 





 


Urine Protein


 100 mg/dL


(NEG-TRACE) 


 


 





 


Urine Glucose (UA)


 100 mg/dL


(NEG) 


 


 





 


Urine Ketones (Stick)


 Trace mg/dL


(NEG) 


 


 





 


Urine Blood Large (NEG)    


 


Urine Nitrite Positive (NEG)    


 


Urine Bilirubin Small (NEG)    


 


Urine Urobilinogen Dipstick


 1.0 mg/dL (0.2


mg/dL) 


 


 





 


Urine Leukocyte Esterase Small (NEG)    


 


Urine RBC >40 /HPF (0-2)    


 


Urine WBC 1-4 /HPF (0-4)    


 


Urine Squamous Epithelial


Cells Occ /LPF 


 


 


 





 


Urine Transitional Epithelial


Cells Few /LPF 


 


 


 





 


Urine Amorphous Sediment Present /HPF    


 


Urine Bacteria


 Few /HPF


(0-FEW) 


 


 





 


Urine Hyaline Casts Few /HPF    


 


Urine Mucus Slight /LPF    


 


Glucose (Fingerstick)


 


 223 mg/dL


(70-99) 166 mg/dL


(70-99) 201 mg/dL


(70-99)


 


Test


 4/13/20


06:09 4/13/20


06:10 


 





 


Glucose (Fingerstick)


 175 mg/dL


(70-99) 


 


 





 


White Blood Count


 


 5.3 x10^3/uL


(4.0-11.0) 


 





 


Red Blood Count


 


 2.48 x10^6/uL


(3.50-5.40) 


 





 


Hemoglobin


 


 8.0 g/dL


(12.0-15.5) 


 





 


Hematocrit


 


 24.3 %


(36.0-47.0) 


 





 


Mean Corpuscular Volume  98 fL ()   


 


Mean Corpuscular Hemoglobin  32 pg (25-35)   


 


Mean Corpuscular Hemoglobin


Concent 


 33 g/dL


(31-37) 


 





 


Red Cell Distribution Width


 


 19.3 %


(11.5-14.5) 


 





 


Platelet Count


 


 318 x10^3/uL


(140-400) 


 





 


Neutrophils (%) (Auto)  76 % (31-73)   


 


Lymphocytes (%) (Auto)  10 % (24-48)   


 


Monocytes (%) (Auto)  12 % (0-9)   


 


Eosinophils (%) (Auto)  2 % (0-3)   


 


Basophils (%) (Auto)  0 % (0-3)   


 


Neutrophils # (Auto)


 


 4.0 x10^3/uL


(1.8-7.7) 


 





 


Lymphocytes # (Auto)


 


 0.5 x10^3/uL


(1.0-4.8) 


 





 


Monocytes # (Auto)


 


 0.6 x10^3/uL


(0.0-1.1) 


 





 


Eosinophils # (Auto)


 


 0.1 x10^3/uL


(0.0-0.7) 


 





 


Basophils # (Auto)


 


 0.0 x10^3/uL


(0.0-0.2) 


 





 


Sodium Level


 


 135 mmol/L


(136-145) 


 





 


Potassium Level


 


 3.6 mmol/L


(3.5-5.1) 


 





 


Chloride Level


 


 98 mmol/L


() 


 





 


Carbon Dioxide Level


 


 23 mmol/L


(21-32) 


 





 


Anion Gap  14 (6-14)   


 


Blood Urea Nitrogen


 


 80 mg/dL


(7-20) 


 





 


Creatinine


 


 2.1 mg/dL


(0.6-1.0) 


 





 


Estimated GFR


(Cockcroft-Gault) 


 25.0 


 


 





 


Glucose Level


 


 167 mg/dL


(70-99) 


 





 


Calcium Level


 


 9.0 mg/dL


(8.5-10.1) 


 





 


Magnesium Level


 


 2.2 mg/dL


(1.8-2.4) 


 





 


Triglycerides Level


 


 245 mg/dL


(0-150) 


 











Problem List


Problems


Medical Problems:


(1) Acute pancreatitis


Status: Acute  





(2) Cholelithiasis


Status: Acute  








Assessment/Plan


supportive care





KIEL BAL MD 4/13/20 1214:


SURGICAL PROGRESS NOTE


Assessment/Plan


pt seen in ICU/dialysis


eyes open, does not engage


no new issues per RN





continue supportive care











SAURAV NASH            Apr 13, 2020 09:30


KIEL BAL MD                Apr 13, 2020 12:14

## 2020-04-13 NOTE — PDOC
PROGRESS NOTES


Assessment


Problems


Medical Problems:


(1) Acute pancreatitis


Status: Acute  





(2) Cholelithiasis


Status: Acute  





Respiratory failure.


Seizure.


Metabolic encephalopathy.


Fever 102.7 degree, recurrent fever noted.


Metabolic acidosis.


Diffuse pulmonary infiltrate.


Pleural effusion.


Pancreatitis, necrotizing.


Gallstone.


Leukocytosis.


Lymphopenia.


Electrolytes imbalances.


Hyperglycemia.


DM.


HTN.


HLD.


Anemia.


Abnormal CXR.


Obesity.


Plan


Keppra if has further seizures.


Treat medical diseases.


Subjective


none


Objective





Vital Signs








  Date Time  Temp Pulse Resp B/P (MAP) Pulse Ox O2 Delivery O2 Flow Rate FiO2


 


4/13/20 08:00  122 28 125/71 (89) 100 Ventilator  


 


4/13/20 07:00 98.3       





 98.3       














Intake and Output 


 


 4/13/20





 07:00


 


Intake Total 2729.0 ml


 


Output Total 966 ml


 


Balance 1763.0 ml


 


 


 


Intake Oral 0 ml


 


IV Total 2729.0 ml


 


Output Urine Total 366 ml


 


Stool Total 100 ml


 


Gastric Drainage Total 500 ml








PHYSICAL EXAM


Alert.  Tracheostomy in place.  Tracks with her eyes, moves extremities to 

commands


PERRL.


EOMI.


CN: no focal findings.


Muscle tone: normal.


Muscle strength: Moves all extremities, no better than 2/5 strength


DTR: 1+


Plantar reflex: Silent


Gait: not examined in bed.


Sensory exam: no abnormal findings.


No cerebellar signs elicited.


Review of Relevant


I have reviewed the following items josy (where applicable) has been applied.


Labs





Laboratory Tests








Test


 4/11/20


15:33 4/11/20


17:30 4/12/20


00:08 4/12/20


06:00


 


Glucose (Fingerstick)


 214 mg/dL


(70-99) 


 208 mg/dL


(70-99) 172 mg/dL


(70-99)


 


Hemoglobin A1c


 


 5.4 %


(4.8-5.6) 


 





 


White Blood Count


 


 


 


 6.6 x10^3/uL


(4.0-11.0)


 


Red Blood Count


 


 


 


 2.09 x10^6/uL


(3.50-5.40)


 


Hemoglobin


 


 


 


 6.8 g/dL


(12.0-15.5)


 


Hematocrit


 


 


 


 21.0 %


(36.0-47.0)


 


Mean Corpuscular Volume


 


 


 


 100 fL


()


 


Mean Corpuscular Hemoglobin    33 pg (25-35) 


 


Mean Corpuscular Hemoglobin


Concent 


 


 


 33 g/dL


(31-37)


 


Red Cell Distribution Width


 


 


 


 22.2 %


(11.5-14.5)


 


Platelet Count


 


 


 


 287 x10^3/uL


(140-400)


 


Neutrophils (%) (Auto)    74 % (31-73) 


 


Lymphocytes (%) (Auto)    10 % (24-48) 


 


Monocytes (%) (Auto)    11 % (0-9) 


 


Eosinophils (%) (Auto)    4 % (0-3) 


 


Basophils (%) (Auto)    1 % (0-3) 


 


Neutrophils # (Auto)


 


 


 


 4.9 x10^3/uL


(1.8-7.7)


 


Lymphocytes # (Auto)


 


 


 


 0.6 x10^3/uL


(1.0-4.8)


 


Monocytes # (Auto)


 


 


 


 0.7 x10^3/uL


(0.0-1.1)


 


Eosinophils # (Auto)


 


 


 


 0.3 x10^3/uL


(0.0-0.7)


 


Basophils # (Auto)


 


 


 


 0.0 x10^3/uL


(0.0-0.2)


 


Sodium Level


 


 


 


 135 mmol/L


(136-145)


 


Potassium Level


 


 


 


 3.6 mmol/L


(3.5-5.1)


 


Chloride Level


 


 


 


 99 mmol/L


()


 


Carbon Dioxide Level


 


 


 


 29 mmol/L


(21-32)


 


Anion Gap    7 (6-14) 


 


Blood Urea Nitrogen


 


 


 


 56 mg/dL


(7-20)


 


Creatinine


 


 


 


 1.9 mg/dL


(0.6-1.0)


 


Estimated GFR


(Cockcroft-Gault) 


 


 


 28.1 





 


Glucose Level


 


 


 


 174 mg/dL


(70-99)


 


Calcium Level


 


 


 


 8.9 mg/dL


(8.5-10.1)


 


Phosphorus Level


 


 


 


 2.9 mg/dL


(2.6-4.7)


 


Albumin


 


 


 


 2.9 g/dL


(3.4-5.0)


 


Test


 4/12/20


08:00 4/12/20


12:00 4/12/20


12:42 4/12/20


17:53


 


O2 Saturation 97 % (92-99)    


 


Arterial Blood pH


 7.47


(7.35-7.45) 


 


 





 


Arterial Blood pCO2 at


Patient Temp 34 mmHg


(35-46) 


 


 





 


Arterial Blood pO2 at Patient


Temp 95 mmHg


() 


 


 





 


Arterial Blood HCO3


 24 mmol/L


(21-28) 


 


 





 


Arterial Blood Base Excess


 1 mmol/L


(-3-3) 


 


 





 


FiO2 35    


 


Urine Collection Type  Unknown   


 


Urine Color  Brown   


 


Urine Clarity  Cloudy   


 


Urine pH  5.0 (<5.0-8.0)   


 


Urine Specific Gravity


 


 >=1.030


(1.000-1.030) 


 





 


Urine Protein


 


 100 mg/dL


(NEG-TRACE) 


 





 


Urine Glucose (UA)


 


 100 mg/dL


(NEG) 


 





 


Urine Ketones (Stick)


 


 Trace mg/dL


(NEG) 


 





 


Urine Blood  Large (NEG)   


 


Urine Nitrite  Positive (NEG)   


 


Urine Bilirubin  Small (NEG)   


 


Urine Urobilinogen Dipstick


 


 1.0 mg/dL (0.2


mg/dL) 


 





 


Urine Leukocyte Esterase  Small (NEG)   


 


Urine RBC  >40 /HPF (0-2)   


 


Urine WBC  1-4 /HPF (0-4)   


 


Urine Squamous Epithelial


Cells 


 Occ /LPF 


 


 





 


Urine Transitional Epithelial


Cells 


 Few /LPF 


 


 





 


Urine Amorphous Sediment  Present /HPF   


 


Urine Bacteria


 


 Few /HPF


(0-FEW) 


 





 


Urine Hyaline Casts  Few /HPF   


 


Urine Mucus  Slight /LPF   


 


Glucose (Fingerstick)


 


 


 223 mg/dL


(70-99) 166 mg/dL


(70-99)


 


Test


 4/13/20


00:02 4/13/20


06:09 4/13/20


06:10 





 


Glucose (Fingerstick)


 201 mg/dL


(70-99) 175 mg/dL


(70-99) 


 





 


White Blood Count


 


 


 5.3 x10^3/uL


(4.0-11.0) 





 


Red Blood Count


 


 


 2.48 x10^6/uL


(3.50-5.40) 





 


Hemoglobin


 


 


 8.0 g/dL


(12.0-15.5) 





 


Hematocrit


 


 


 24.3 %


(36.0-47.0) 





 


Mean Corpuscular Volume   98 fL ()  


 


Mean Corpuscular Hemoglobin   32 pg (25-35)  


 


Mean Corpuscular Hemoglobin


Concent 


 


 33 g/dL


(31-37) 





 


Red Cell Distribution Width


 


 


 19.3 %


(11.5-14.5) 





 


Platelet Count


 


 


 318 x10^3/uL


(140-400) 





 


Neutrophils (%) (Auto)   76 % (31-73)  


 


Lymphocytes (%) (Auto)   10 % (24-48)  


 


Monocytes (%) (Auto)   12 % (0-9)  


 


Eosinophils (%) (Auto)   2 % (0-3)  


 


Basophils (%) (Auto)   0 % (0-3)  


 


Neutrophils # (Auto)


 


 


 4.0 x10^3/uL


(1.8-7.7) 





 


Lymphocytes # (Auto)


 


 


 0.5 x10^3/uL


(1.0-4.8) 





 


Monocytes # (Auto)


 


 


 0.6 x10^3/uL


(0.0-1.1) 





 


Eosinophils # (Auto)


 


 


 0.1 x10^3/uL


(0.0-0.7) 





 


Basophils # (Auto)


 


 


 0.0 x10^3/uL


(0.0-0.2) 





 


Sodium Level


 


 


 135 mmol/L


(136-145) 





 


Potassium Level


 


 


 3.6 mmol/L


(3.5-5.1) 





 


Chloride Level


 


 


 98 mmol/L


() 





 


Carbon Dioxide Level


 


 


 23 mmol/L


(21-32) 





 


Anion Gap   14 (6-14)  


 


Blood Urea Nitrogen


 


 


 80 mg/dL


(7-20) 





 


Creatinine


 


 


 2.1 mg/dL


(0.6-1.0) 





 


Estimated GFR


(Cockcroft-Gault) 


 


 25.0 


 





 


Glucose Level


 


 


 167 mg/dL


(70-99) 





 


Calcium Level


 


 


 9.0 mg/dL


(8.5-10.1) 





 


Magnesium Level


 


 


 2.2 mg/dL


(1.8-2.4) 





 


Triglycerides Level


 


 


 245 mg/dL


(0-150) 











Laboratory Tests








Test


 4/12/20


12:00 4/12/20


12:42 4/12/20


17:53 4/13/20


00:02


 


Urine Collection Type Unknown    


 


Urine Color Brown    


 


Urine Clarity Cloudy    


 


Urine pH 5.0 (<5.0-8.0)    


 


Urine Specific Gravity


 >=1.030


(1.000-1.030) 


 


 





 


Urine Protein


 100 mg/dL


(NEG-TRACE) 


 


 





 


Urine Glucose (UA)


 100 mg/dL


(NEG) 


 


 





 


Urine Ketones (Stick)


 Trace mg/dL


(NEG) 


 


 





 


Urine Blood Large (NEG)    


 


Urine Nitrite Positive (NEG)    


 


Urine Bilirubin Small (NEG)    


 


Urine Urobilinogen Dipstick


 1.0 mg/dL (0.2


mg/dL) 


 


 





 


Urine Leukocyte Esterase Small (NEG)    


 


Urine RBC >40 /HPF (0-2)    


 


Urine WBC 1-4 /HPF (0-4)    


 


Urine Squamous Epithelial


Cells Occ /LPF 


 


 


 





 


Urine Transitional Epithelial


Cells Few /LPF 


 


 


 





 


Urine Amorphous Sediment Present /HPF    


 


Urine Bacteria


 Few /HPF


(0-FEW) 


 


 





 


Urine Hyaline Casts Few /HPF    


 


Urine Mucus Slight /LPF    


 


Glucose (Fingerstick)


 


 223 mg/dL


(70-99) 166 mg/dL


(70-99) 201 mg/dL


(70-99)


 


Test


 4/13/20


06:09 4/13/20


06:10 


 





 


Glucose (Fingerstick)


 175 mg/dL


(70-99) 


 


 





 


White Blood Count


 


 5.3 x10^3/uL


(4.0-11.0) 


 





 


Red Blood Count


 


 2.48 x10^6/uL


(3.50-5.40) 


 





 


Hemoglobin


 


 8.0 g/dL


(12.0-15.5) 


 





 


Hematocrit


 


 24.3 %


(36.0-47.0) 


 





 


Mean Corpuscular Volume  98 fL ()   


 


Mean Corpuscular Hemoglobin  32 pg (25-35)   


 


Mean Corpuscular Hemoglobin


Concent 


 33 g/dL


(31-37) 


 





 


Red Cell Distribution Width


 


 19.3 %


(11.5-14.5) 


 





 


Platelet Count


 


 318 x10^3/uL


(140-400) 


 





 


Neutrophils (%) (Auto)  76 % (31-73)   


 


Lymphocytes (%) (Auto)  10 % (24-48)   


 


Monocytes (%) (Auto)  12 % (0-9)   


 


Eosinophils (%) (Auto)  2 % (0-3)   


 


Basophils (%) (Auto)  0 % (0-3)   


 


Neutrophils # (Auto)


 


 4.0 x10^3/uL


(1.8-7.7) 


 





 


Lymphocytes # (Auto)


 


 0.5 x10^3/uL


(1.0-4.8) 


 





 


Monocytes # (Auto)


 


 0.6 x10^3/uL


(0.0-1.1) 


 





 


Eosinophils # (Auto)


 


 0.1 x10^3/uL


(0.0-0.7) 


 





 


Basophils # (Auto)


 


 0.0 x10^3/uL


(0.0-0.2) 


 





 


Sodium Level


 


 135 mmol/L


(136-145) 


 





 


Potassium Level


 


 3.6 mmol/L


(3.5-5.1) 


 





 


Chloride Level


 


 98 mmol/L


() 


 





 


Carbon Dioxide Level


 


 23 mmol/L


(21-32) 


 





 


Anion Gap  14 (6-14)   


 


Blood Urea Nitrogen


 


 80 mg/dL


(7-20) 


 





 


Creatinine


 


 2.1 mg/dL


(0.6-1.0) 


 





 


Estimated GFR


(Cockcroft-Gault) 


 25.0 


 


 





 


Glucose Level


 


 167 mg/dL


(70-99) 


 





 


Calcium Level


 


 9.0 mg/dL


(8.5-10.1) 


 





 


Magnesium Level


 


 2.2 mg/dL


(1.8-2.4) 


 





 


Triglycerides Level


 


 245 mg/dL


(0-150) 


 











Microbiology


4/10/20 Blood Culture - Preliminary, Resulted


          NO GROWTH AFTER 2 DAYS


4/4/20 Urine Culture - Final, Complete


         


4/4/20 Urine Culture Result 1 ([facility]) - Final, Complete


Medications





Current Medications


Sodium Chloride 1,000 ml @  1,000 mls/hr Q1H IV  Last administered on 3/16/20at 

03:00;  Start 3/16/20 at 03:00;  Stop 3/16/20 at 03:59;  Status DC


Ondansetron HCl (Zofran) 4 mg 1X  ONCE IVP  Last administered on 3/16/20at 

03:27;  Start 3/16/20 at 03:00;  Stop 3/16/20 at 03:01;  Status DC


Morphine Sulfate (Morphine Sulfate) 4 mg 1X  ONCE IV ;  Start 3/16/20 at 03:00; 

Stop 3/16/20 at 03:01;  Status Cancel


Ketorolac Tromethamine (Toradol 30mg Vial) 30 mg 1X  ONCE IV  Last administered 

on 3/16/20at 02:54;  Start 3/16/20 at 03:00;  Stop 3/16/20 at 03:01;  Status DC


Fentanyl Citrate (Fentanyl 2ml Vial) 25 mcg 1X  ONCE IVP  Last administered on 

3/16/20at 03:23;  Start 3/16/20 at 03:30;  Stop 3/16/20 at 03:31;  Status DC


Fentanyl Citrate (Fentanyl 2ml Vial) 100 mcg STK-MED ONCE .ROUTE ;  Start 

3/16/20 at 03:18;  Stop 3/16/20 at 03:18;  Status DC


Iohexol (Omnipaque 350 Mg/ml) 90 ml 1X  ONCE IV  Last administered on 3/16/20at 

03:25;  Start 3/16/20 at 03:30;  Stop 3/16/20 at 03:31;  Status DC


Info (CONTRAST GIVEN -- Rx MONITORING) 1 each PRN DAILY  PRN MC SEE COMMENTS;  

Start 3/16/20 at 03:30;  Stop 3/18/20 at 03:29;  Status DC


Hydromorphone HCl (Dilaudid) 0.5 mg 1X  ONCE IV  Last administered on 3/16/20at 

03:55;  Start 3/16/20 at 04:30;  Stop 3/16/20 at 04:32;  Status DC


Ondansetron HCl (Zofran) 4 mg PRN Q8HRS  PRN IV NAUSEA/VOMITING 1ST CHOICE;  

Start 3/16/20 at 05:00;  Stop 3/16/20 at 09:27;  Status DC


Morphine Sulfate (Morphine Sulfate) 2 mg PRN Q2HR  PRN IV SEVERE PAIN 7-10 Last 

administered on 3/17/20at 12:26;  Start 3/16/20 at 05:00;  Stop 3/17/20 at 

14:15;  Status DC


Sodium Chloride 1,000 ml @  125 mls/hr Q8H IV  Last administered on 3/16/20at 

20:56;  Start 3/16/20 at 05:00;  Stop 3/17/20 at 04:59;  Status DC


Hydromorphone HCl (Dilaudid) 0.5 mg PRN Q3HRS  PRN IV SEVERE PAIN 7-10 Last 

administered on 3/17/20at 10:06;  Start 3/16/20 at 05:00;  Stop 3/17/20 at 

12:01;  Status DC


Piperacillin Sod/ Tazobactam Sod 4.5 gm/Sodium Chloride 100 ml @  200 mls/hr 1X 

ONCE IV  Last administered on 3/16/20at 05:44;  Start 3/16/20 at 06:00;  Stop 

3/16/20 at 06:29;  Status DC


Ondansetron HCl (Zofran) 4 mg PRN Q4HRS  PRN IV NAUSEA/VOMITING 1ST CHOICE Last 

administered on 4/12/20at 09:56;  Start 3/16/20 at 09:30


Insulin Human Lispro (HumaLOG) 0-9 UNITS Q6HRS SQ  Last administered on 

4/13/20at 06:12;  Start 3/16/20 at 09:30


Dextrose (Dextrose 50%-Water Syringe) 12.5 gm PRN Q15MIN  PRN IV SEE COMMENTS;  

Start 3/16/20 at 09:30


Pantoprazole Sodium (PROTONIX VIAL for IV PUSH) 40 mg DAILYAC IVP  Last admini

stered on 4/13/20at 08:12;  Start 3/16/20 at 11:30


Prochlorperazine Edisylate (Compazine) 10 mg PRN Q6HRS  PRN IV NAUSEA/VOMITING, 

2nd CHOICE Last administered on 3/17/20at 00:42;  Start 3/16/20 at 17:45


Atenolol (Tenormin) 100 mg DAILY PO ;  Start 3/17/20 at 09:00;  Stop 3/16/20 at 

20:08;  Status DC


Metoprolol Tartrate (Lopressor Vial) 2.5 mg Q6HRS IVP  Last administered on 

3/17/20at 05:51;  Start 3/16/20 at 20:15;  Stop 3/17/20 at 10:02;  Status DC


Metoprolol Tartrate (Lopressor Vial) 5 mg Q6HRS IVP  Last administered on 

3/26/20at 00:12;  Start 3/17/20 at 10:15;  Stop 3/28/20 at 08:48;  Status DC


Hydromorphone HCl (Dilaudid) 1 mg PRN Q3HRS  PRN IV SEVERE PAIN 7-10 Last 

administered on 3/23/20at 05:13;  Start 3/17/20 at 12:00;  Stop 3/31/20 at 

00:25;  Status DC


Lidocaine HCl (Buffered Lidocaine 1%) 3 ml STK-MED ONCE .ROUTE ;  Start 3/17/20 

at 12:55;  Stop 3/17/20 at 12:56;  Status DC


Albumin Human 500 ml @  125 mls/hr 1X  ONCE IV  Last administered on 3/17/20at 

14:33;  Start 3/17/20 at 14:30;  Stop 3/17/20 at 18:32;  Status DC


Norepinephrine Bitartrate 8 mg/ Dextrose 258 ml @  17.299 mls/ hr CONT  PRN IV 

PER PROTOCOL Last administered on 4/10/20at 13:31;  Start 3/17/20 at 15:30


Sodium Chloride 1,000 ml @  125 mls/hr Q8H IV  Last administered on 3/17/20at 

21:04;  Start 3/17/20 at 16:00;  Stop 3/18/20 at 02:42;  Status DC


Albumin Human 500 ml @  125 mls/hr PRN BID  PRN IV After every 2L NSS & BP < 

90mm Last administered on 4/1/20at 14:21;  Start 3/17/20 at 16:00


Iohexol (Omnipaque 300 Mg/ml) 60 ml 1X  ONCE IV  Last administered on 3/17/20at 

17:20;  Start 3/17/20 at 17:00;  Stop 3/17/20 at 17:01;  Status DC


Info (CONTRAST GIVEN -- Rx MONITORING) 1 each PRN DAILY  PRN MC SEE COMMENTS;  

Start 3/17/20 at 17:00;  Stop 3/19/20 at 16:59;  Status DC


Meropenem 1 gm/ Sodium Chloride 100 ml @  200 mls/hr Q8HRS IV  Last administered

on 3/18/20at 05:45;  Start 3/17/20 at 20:00;  Stop 3/18/20 at 08:48;  Status DC


Furosemide (Lasix) 40 mg 1X  ONCE IVP  Last administered on 3/17/20at 22:12;  

Start 3/17/20 at 22:30;  Stop 3/17/20 at 22:31;  Status DC


Calcium Chloride 1000 mg/Sodium Chloride 110 ml @  220 mls/hr 1X  ONCE IV  Last 

administered on 3/17/20at 22:11;  Start 3/17/20 at 22:30;  Stop 3/17/20 at 

22:59;  Status DC


Albuterol Sulfate (Ventolin Neb Soln) 2.5 mg 1X  ONCE NEB  Last administered on 

3/18/20at 00:56;  Start 3/17/20 at 22:30;  Stop 3/17/20 at 22:31;  Status DC


Insulin Human Regular (HumuLIN R VIAL) 5 unit 1X  ONCE IV  Last administered on 

3/17/20at 22:14;  Start 3/17/20 at 22:30;  Stop 3/17/20 at 22:31;  Status DC


Magnesium Sulfate 50 ml @ 25 mls/hr 1X  ONCE IV  Last administered on 3/18/20at 

02:57;  Start 3/18/20 at 03:00;  Stop 3/18/20 at 04:59;  Status DC


Calcium Gluconate 1000 mg/Sodium Chloride 110 ml @  220 mls/hr 1X  ONCE IV  Last

administered on 3/18/20at 02:46;  Start 3/18/20 at 03:00;  Stop 3/18/20 at 

03:29;  Status DC


Sodium Chloride 1,000 ml @  200 mls/hr Q5H IV  Last administered on 3/18/20at 

02:46;  Start 3/18/20 at 03:00;  Stop 3/18/20 at 10:21;  Status DC


Calcium Gluconate 1000 mg/Sodium Chloride 110 ml @  220 mls/hr 1X  ONCE IV  Last

administered on 3/18/20at 03:21;  Start 3/18/20 at 03:30;  Stop 3/18/20 at 

03:59;  Status DC


Sodium Bicarbonate 50 meq/Sodium Chloride 1,050 ml @  75 mls/hr Q14H IV  Last 

administered on 3/22/20at 21:10;  Start 3/18/20 at 07:30;  Stop 3/23/20 at 

10:28;  Status DC


Calcium Gluconate 2000 mg/Sodium Chloride 120 ml @  220 mls/hr 1X  ONCE IV  Last

administered on 3/18/20at 09:05;  Start 3/18/20 at 07:30;  Stop 3/18/20 at 

08:02;  Status DC


Lidocaine HCl (Xylocaine-Mpf 1% 2ml Vial) 2 ml STK-MED ONCE .ROUTE ;  Start 

3/18/20 at 08:47;  Stop 3/18/20 at 08:47;  Status DC


Meropenem 500 mg/ Sodium Chloride 50 ml @  100 mls/hr Q12HR IV  Last 

administered on 3/23/20at 21:01;  Start 3/18/20 at 18:00;  Stop 3/24/20 at 

07:58;  Status DC


Lidocaine HCl (Buffered Lidocaine 1%) 3 ml STK-MED ONCE .ROUTE ;  Start 3/18/20 

at 09:46;  Stop 3/18/20 at 09:46;  Status DC


Lidocaine HCl (Buffered Lidocaine 1%) 6 ml 1X  ONCE INJ  Last administered on 

3/18/20at 10:26;  Start 3/18/20 at 10:15;  Stop 3/18/20 at 10:16;  Status DC


Info (Tpn Per Pharmacy) 1 each PRN DAILY  PRN MC SEE COMMENTS Last administered 

on 4/12/20at 11:14;  Start 3/18/20 at 12:00


Sodium Chloride 1,000 ml @  1,000 mls/hr Q1H PRN IV hypotension;  Start 3/18/20 

at 12:07;  Stop 3/18/20 at 18:06;  Status DC


Diphenhydramine HCl (Benadryl) 25 mg 1X PRN  PRN IV ITCHING;  Start 3/18/20 at 

12:15;  Stop 3/19/20 at 12:14;  Status DC


Diphenhydramine HCl (Benadryl) 25 mg 1X PRN  PRN IV ITCHING;  Start 3/18/20 at 

12:15;  Stop 3/19/20 at 12:14;  Status DC


Sodium Chloride 1,000 ml @  400 mls/hr Q2H30M PRN IV PATENCY;  Start 3/18/20 at 

12:07;  Stop 3/19/20 at 00:06;  Status DC


Info (PHARMACY MONITORING -- do not chart) 1 each PRN DAILY  PRN MC SEE 

COMMENTS;  Start 3/18/20 at 12:15;  Stop 3/20/20 at 08:13;  Status DC


Sodium Chloride 90 meq/Calcium Gluconate 10 meq/ Multivitamins 10 ml/Chromium/ 

Copper/Manganese/ Seleni/Zn 1 ml/ Total Parenteral Nutrition/Amino 

Acids/Dextrose/ Fat Emulsion Intravenous 55.005 ml  @ 2.292 mls/hr TPN  CONT IV 

;  Start 3/18/20 at 22:00;  Stop 3/18/20 at 12:33;  Status DC


Info (Tpn Per Pharmacy) 1 each PRN DAILY  PRN MC SEE COMMENTS;  Start 3/18/20 at

12:30;  Status UNV


Sodium Chloride 90 meq/Calcium Gluconate 10 meq/ Multivitamins 10 ml/Chromium/ 

Copper/Manganese/ Seleni/Zn 0.5 ml/ Total Parenteral Nutrition/Amino 

Acids/Dextrose/ Fat Emulsion Intravenous 1,512 ml @  63 mls/hr TPN  CONT IV  

Last administered on 3/18/20at 22:06;  Start 3/18/20 at 22:00;  Stop 3/19/20 at 

21:59;  Status DC


Calcium Carbonate/ Glycine (Tums) 500 mg PRN AFTMEALHC  PRN PO INDIGESTION;  

Start 3/18/20 at 17:45


Calcium Gluconate (Calcium Gluconate) 2,000 mg 1X  ONCE IVP  Last administered 

on 3/19/20at 02:19;  Start 3/19/20 at 02:15;  Stop 3/19/20 at 02:16;  Status DC


Calcium Chloride 3000 mg/Sodium Chloride 1,030 ml @  50 mls/hr [id number] IV  Last 

administered on 3/21/20at 02:17;  Start 3/19/20 at 08:00;  Stop 3/21/20 at 

15:23;  Status DC


Lorazepam (Ativan Inj) 1 mg PRN Q4HRS  PRN IVP ANXIETY / AGITATION Last 

administered on 3/23/20at 00:34;  Start 3/19/20 at 09:00


Sodium Chloride 1,000 ml @  1,000 mls/hr Q1H PRN IV hypotension;  Start 3/19/20 

at 08:56;  Stop 3/19/20 at 14:55;  Status DC


Albumin Human 200 ml @  200 mls/hr 1X PRN  PRN IV Hypotension;  Start 3/19/20 at

09:00;  Stop 3/19/20 at 14:59;  Status DC


Diphenhydramine HCl (Benadryl) 25 mg 1X PRN  PRN IV ITCHING;  Start 3/19/20 at 

09:00;  Stop 3/20/20 at 08:59;  Status DC


Diphenhydramine HCl (Benadryl) 25 mg 1X PRN  PRN IV ITCHING;  Start 3/19/20 at 

09:00;  Stop 3/20/20 at 08:59;  Status DC


Sodium Chloride 1,000 ml @  400 mls/hr Q2H30M PRN IV PATENCY;  Start 3/19/20 at 

08:56;  Stop 3/19/20 at 20:55;  Status DC


Info (PHARMACY MONITORING -- do not chart) 1 each PRN DAILY  PRN MC SEE 

COMMENTS;  Start 3/19/20 at 09:00;  Status UNV


Info (PHARMACY MONITORING -- do not chart) 1 each PRN DAILY  PRN MC SEE 

COMMENTS;  Start 3/19/20 at 09:00;  Stop 3/20/20 at 08:13;  Status DC


Digoxin (Lanoxin) 500 mcg 1X  ONCE IV  Last administered on 3/19/20at 10:04;  

Start 3/19/20 at 10:00;  Stop 3/19/20 at 10:01;  Status DC


Digoxin (Lanoxin) 125 mcg 1X  ONCE IV  Last administered on 3/19/20at 17:10;  

Start 3/19/20 at 18:00;  Stop 3/19/20 at 18:01;  Status DC


Magnesium Sulfate 100 ml @  25 mls/hr 1X  ONCE IV  Last administered on 

3/19/20at 12:48;  Start 3/19/20 at 13:00;  Stop 3/19/20 at 16:59;  Status DC


Sodium Chloride 90 meq/Magnesium Sulfate 10 meq/ Calcium Gluconate 20 meq/ 

Multivitamins 10 ml/Chromium/ Copper/Manganese/ Seleni/Zn 0.5 ml/ Total 

Parenteral Nutrition/Amino Acids/Dextrose/ Fat Emulsion Intravenous 1,512 ml @  

63 mls/hr TPN  CONT IV  Last administered on 3/19/20at 22:25;  Start 3/19/20 at 

22:00;  Stop 3/20/20 at 21:59;  Status DC


Sodium Chloride 1,000 ml @  1,000 mls/hr Q1H PRN IV hypotension;  Start 3/20/20 

at 08:05;  Stop 3/20/20 at 14:04;  Status DC


Albumin Human 200 ml @  200 mls/hr 1X  ONCE IV  Last administered on 3/20/20at 

08:57;  Start 3/20/20 at 08:15;  Stop 3/20/20 at 09:14;  Status DC


Diphenhydramine HCl (Benadryl) 25 mg 1X PRN  PRN IV ITCHING;  Start 3/20/20 at 

08:15;  Stop 3/21/20 at 08:14;  Status DC


Diphenhydramine HCl (Benadryl) 25 mg 1X PRN  PRN IV ITCHING;  Start 3/20/20 at 

08:15;  Stop 3/21/20 at 08:14;  Status DC


Sodium Chloride 1,000 ml @  400 mls/hr Q2H30M PRN IV PATENCY;  Start 3/20/20 at 

08:05;  Stop 3/20/20 at 20:04;  Status DC


Info (PHARMACY MONITORING -- do not chart) 1 each PRN DAILY  PRN MC SEE 

COMMENTS;  Start 3/20/20 at 08:15;  Stop 3/24/20 at 07:57;  Status DC


Sodium Chloride 90 meq/Potassium Chloride 15 meq/ Potassium Phosphate 10 mmol/ 

Magnesium Sulfate 10 meq/Calcium Gluconate 20 meq/ Multivitamins 10 ml/Chromium/

Copper/Manganese/ Seleni/Zn 0.5 ml/ Total Parenteral Nutrition/Amino 

Acids/Dextrose/ Fat Emulsion Intravenous 1,512 ml @  63 mls/hr TPN  CONT IV  

Last administered on 3/20/20at 21:01;  Start 3/20/20 at 22:00;  Stop 3/21/20 at 

21:59;  Status DC


Potassium Chloride/Water 100 ml @  100 mls/hr 1X  ONCE IV  Last administered on 

3/20/20at 14:09;  Start 3/20/20 at 14:00;  Stop 3/20/20 at 14:59;  Status DC


Benzocaine (Hurricaine One) 1 spray 1X  ONCE MM  Last administered on 3/20/20at 

16:38;  Start 3/20/20 at 14:30;  Stop 3/20/20 at 14:31;  Status DC


Lidocaine HCl (Glydo (Lidocaine) Jelly) 1 thomas 1X  ONCE MM  Last administered on 

3/20/20at 16:38;  Start 3/20/20 at 14:30;  Stop 3/20/20 at 14:31;  Status DC


Linezolid/Dextrose 300 ml @  300 mls/hr Q12HR IV  Last administered on 3/26/20at

21:04;  Start 3/20/20 at 20:00;  Stop 3/27/20 at 07:50;  Status DC


Acetaminophen (Tylenol) 650 mg PRN Q6HRS  PRN PO MILD PAIN / TEMP;  Start 

3/21/20 at 03:30;  Stop 3/21/20 at 03:36;  Status DC


Acetaminophen (Tylenol) 650 mg PRN Q6HRS  PRN PEG MILD PAIN / TEMP Last 

administered on 3/26/20at 07:35;  Start 3/21/20 at 03:36


Sodium Chloride 1,000 ml @  1,000 mls/hr Q1H PRN IV hypotension;  Start 3/21/20 

at 07:50;  Stop 3/21/20 at 13:49;  Status DC


Albumin Human 200 ml @  200 mls/hr 1X PRN  PRN IV Hypotension;  Start 3/21/20 at

08:00;  Stop 3/21/20 at 13:59;  Status DC


Sodium Chloride (Normal Saline Flush) 10 ml 1X PRN  PRN IV AP catheter pack;  

Start 3/21/20 at 08:00;  Stop 3/22/20 at 07:59;  Status DC


Sodium Chloride (Normal Saline Flush) 10 ml 1X PRN  PRN IV  catheter pack;  

Start 3/21/20 at 08:00;  Stop 3/22/20 at 07:59;  Status DC


Sodium Chloride 1,000 ml @  400 mls/hr Q2H30M PRN IV PATENCY;  Start 3/21/20 at 

07:50;  Stop 3/21/20 at 19:49;  Status DC


Info (PHARMACY MONITORING -- do not chart) 1 each PRN DAILY  PRN MC SEE 

COMMENTS;  Start 3/21/20 at 08:00;  Status UNV


Info (PHARMACY MONITORING -- do not chart) 1 each PRN DAILY  PRN MC SEE 

COMMENTS;  Start 3/21/20 at 08:00;  Stop 3/23/20 at 08:25;  Status DC


Sodium Chloride 90 meq/Potassium Chloride 15 meq/ Potassium Phosphate 10 mmol/ 

Magnesium Sulfate 10 meq/Calcium Gluconate 20 meq/ Multivitamins 10 ml/Chromium/

Copper/Manganese/ Seleni/Zn 0.5 ml/ Total Parenteral Nutrition/Amino 

Acids/Dextrose/ Fat Emulsion Intravenous 1,512 ml @  63 mls/hr TPN  CONT IV  

Last administered on 3/21/20at 20:57;  Start 3/21/20 at 22:00;  Stop 3/22/20 at 

21:59;  Status DC


Sodium Chloride 90 meq/Potassium Chloride 15 meq/ Potassium Phosphate 15 mmol/ 

Magnesium Sulfate 10 meq/Calcium Gluconate 20 meq/ Multivitamins 10 ml/Chromium/

Copper/Manganese/ Seleni/Zn 0.5 ml/ Total Parenteral Nutrition/Amino 

Acids/Dextrose/ Fat Emulsion Intravenous 1,512 ml @  63 mls/hr TPN  CONT IV ;  

Start 3/22/20 at 22:00;  Stop 3/22/20 at 14:16;  Status DC


Sodium Chloride 90 meq/Potassium Chloride 15 meq/ Potassium Phosphate 15 mmol/ 

Magnesium Sulfate 10 meq/Calcium Gluconate 20 meq/ Multivitamins 10 ml/Chromium/

Copper/Manganese/ Seleni/Zn 0.5 ml/ Total Parenteral Nutrition/Amino 

Acids/Dextrose/ Fat Emulsion Intravenous 1,200 ml @  50 mls/hr TPN  CONT IV ;  

Start 3/22/20 at 22:00;  Stop 3/22/20 at 14:17;  Status DC


Sodium Chloride 90 meq/Potassium Chloride 15 meq/ Potassium Phosphate 10 mmol/ 

Magnesium Sulfate 10 meq/Calcium Gluconate 20 meq/ Multivitamins 10 ml/Chromium/

Copper/Manganese/ Seleni/Zn 0.5 ml/ Total Parenteral Nutrition/Amino 

Acids/Dextrose/ Fat Emulsion Intravenous 1,200 ml @  50 mls/hr TPN  CONT IV  

Last administered on 3/22/20at 23:29;  Start 3/22/20 at 22:00;  Stop 3/23/20 at 

21:59;  Status DC


Sodium Chloride 1,000 ml @  1,000 mls/hr Q1H PRN IV hypotension;  Start 3/23/20 

at 07:28;  Stop 3/23/20 at 13:27;  Status DC


Albumin Human 200 ml @  200 mls/hr 1X  ONCE IV  Last administered on 3/23/20at 

08:51;  Start 3/23/20 at 07:30;  Stop 3/23/20 at 08:29;  Status DC


Diphenhydramine HCl (Benadryl) 25 mg 1X PRN  PRN IV ITCHING;  Start 3/23/20 at 

07:30;  Stop 3/24/20 at 07:29;  Status DC


Diphenhydramine HCl (Benadryl) 25 mg 1X PRN  PRN IV ITCHING;  Start 3/23/20 at 

07:30;  Stop 3/24/20 at 07:29;  Status DC


Sodium Chloride 1,000 ml @  400 mls/hr Q2H30M PRN IV PATENCY;  Start 3/23/20 at 

07:28;  Stop 3/23/20 at 19:27;  Status DC


Info (PHARMACY MONITORING -- do not chart) 1 each PRN DAILY  PRN MC SEE 

COMMENTS;  Start 3/23/20 at 07:30;  Stop 4/3/20 at 13:01;  Status DC


Metronidazole 100 ml @  100 mls/hr Q6HRS IV  Last administered on 4/8/20at 

06:26;  Start 3/23/20 at 08:30;  Stop 4/8/20 at 09:58;  Status DC


Micafungin Sodium 100 mg/Dextrose 100 ml @  100 mls/hr Q24H IV  Last 

administered on 4/12/20at 10:26;  Start 3/23/20 at 09:00


Propofol 0 ml @ As Directed STK-MED ONCE IV ;  Start 3/23/20 at 07:53;  Stop 

3/23/20 at 07:53;  Status DC


Etomidate (Amidate) 20 mg STK-MED ONCE IV ;  Start 3/23/20 at 07:53;  Stop 

3/23/20 at 07:54;  Status DC


Midazolam HCl (Versed) 5 mg STK-MED ONCE .ROUTE ;  Start 3/23/20 at 07:57;  Stop

3/23/20 at 07:57;  Status DC


Fentanyl Citrate 30 ml @ 0 mls/hr CONT  PRN IV SEE PROTOCOL Last administered on

4/13/20at 07:09;  Start 3/23/20 at 08:15


Artificial Tears (Artificial Tears) 1 drop PRN Q1HR  PRN OU DRY EYE, 1st choice;

 Start 3/23/20 at 08:15


Midazolam HCl 50 mg/Sodium Chloride 50 ml @ 0 mls/hr CONT  PRN IV SEE PROTOCOL 

Last administered on 3/26/20at 22:39;  Start 3/23/20 at 08:15;  Stop 3/28/20 at 

15:59;  Status DC


Etomidate (Amidate) 8 mg 1X  ONCE IV  Last administered on 3/23/20at 08:33;  

Start 3/23/20 at 08:30;  Stop 3/23/20 at 08:31;  Status DC


Succinylcholine Chloride (Anectine) 120 mg 1X  ONCE IV  Last administered on 

3/23/20at 08:34;  Start 3/23/20 at 08:30;  Stop 3/23/20 at 08:31;  Status DC


Midazolam HCl (Versed) 5 mg 1X  ONCE IV ;  Start 3/23/20 at 08:30;  Stop 3/23/20

at 08:31;  Status DC


Potassium Chloride 15 meq/ Bicarbonate Dialysis Soln w/ out KCl 5,007.5 ml  @ 

1,000 mls/ hr Q5H1M IV  Last administered on 3/24/20at 11:11;  Start 3/23/20 at 

12:00;  Stop 3/24/20 at 11:15;  Status DC


Potassium Chloride 15 meq/ Bicarbonate Dialysis Soln w/ out KCl 5,007.5 ml  @ 

1,000 mls/ hr Q5H1M IV  Last administered on 3/24/20at 11:12;  Start 3/23/20 at 

12:00;  Stop 3/24/20 at 11:17;  Status DC


Potassium Chloride 15 meq/ Bicarbonate Dialysis Soln w/ out KCl 5,007.5 ml  @ 

1,000 mls/ hr Q5H1M IV  Last administered on 3/24/20at 11:11;  Start 3/23/20 at 

12:00;  Stop 3/24/20 at 11:19;  Status DC


Sodium Chloride 90 meq/Potassium Chloride 15 meq/ Potassium Phosphate 10 mmol/ 

Magnesium Sulfate 10 meq/Calcium Gluconate 20 meq/ Multivitamins 10 ml/Chromium/

Copper/Manganese/ Seleni/Zn 0.5 ml/ Total Parenteral Nutrition/Amino 

Acids/Dextrose/ Fat Emulsion Intravenous 1,400 ml @  58.333 mls/ hr TPN  CONT IV

 Last administered on 3/23/20at 21:42;  Start 3/23/20 at 22:00;  Stop 3/24/20 at

21:59;  Status DC


Heparin Sodium (Porcine) (Heparin Sodium) 5,000 unit Q8HRS SQ  Last administered

on 3/28/20at 05:55;  Start 3/23/20 at 15:00;  Stop 3/28/20 at 13:28;  Status DC


Meropenem 500 mg/ Sodium Chloride 50 ml @  100 mls/hr Q6HRS IV  Last 

administered on 3/25/20at 06:00;  Start 3/24/20 at 09:00;  Stop 3/25/20 at 

07:29;  Status DC


Potassium Phosphate 20 mmol/ Sodium Chloride 106.6667 ml @  51.667 m... 1X  ONCE

IV  Last administered on 3/24/20at 11:22;  Start 3/24/20 at 10:15;  Stop 3/24/20

at 12:18;  Status DC


Acetaminophen (Tylenol Supp) 650 mg PRN Q6HRS  PRN CT MILD PAIN / TEMP Last 

administered on 4/12/20at 17:13;  Start 3/24/20 at 10:30


Potassium Chloride/Water 100 ml @  100 mls/hr Q1H IV  Last administered on 

3/24/20at 12:12;  Start 3/24/20 at 11:00;  Stop 3/24/20 at 12:59;  Status DC


Potassium Chloride 20 meq/ Bicarbonate Dialysis Soln w/ out KCl 5,010 ml @  

1,000 mls/hr Q5H1M IV  Last administered on 3/25/20at 08:48;  Start 3/24/20 at 

12:00;  Stop 3/25/20 at 13:03;  Status DC


Potassium Chloride 20 meq/ Bicarbonate Dialysis Soln w/ out KCl 5,010 ml @  1,0

00 mls/hr Q5H1M IV  Last administered on 3/29/20at 14:52;  Start 3/24/20 at 

11:30;  Stop 3/29/20 at 19:59;  Status DC


Potassium Chloride 20 meq/ Bicarbonate Dialysis Soln w/ out KCl 5,010 ml @  

1,000 mls/hr Q5H1M IV  Last administered on 3/29/20at 14:53;  Start 3/24/20 at 

11:30;  Stop 3/29/20 at 19:59;  Status DC


Sodium Chloride 90 meq/Potassium Chloride 15 meq/ Potassium Phosphate 15 mmol/ 

Magnesium Sulfate 10 meq/Calcium Gluconate 15 meq/ Multivitamins 10 ml/Chromium/

Copper/Manganese/ Seleni/Zn 0.5 ml/ Total Parenteral Nutrition/Amino 

Acids/Dextrose/ Fat Emulsion Intravenous 1,400 ml @  58.333 mls/ hr TPN  CONT IV

 Last administered on 3/24/20at 22:17;  Start 3/24/20 at 22:00;  Stop 3/25/20 at

21:59;  Status DC


Cefepime HCl (Maxipime) 2 gm Q12HR IVP  Last administered on 4/7/20at 20:56;  

Start 3/25/20 at 09:00;  Stop 4/8/20 at 09:58;  Status DC


Daptomycin 500 mg/ Sodium Chloride 50 ml @  100 mls/hr Q48H IV  Last 

administered on 4/10/20at 09:57;  Start 3/25/20 at 08:30;  Stop 4/10/20 at 

10:07;  Status DC


Lidocaine HCl (Buffered Lidocaine 1%) 3 ml 1X  ONCE INJ  Last administered on 

3/25/20at 10:27;  Start 3/25/20 at 10:30;  Stop 3/25/20 at 10:31;  Status DC


Potassium Phosphate 20 mmol/ Sodium Chloride 106.6667 ml @  51.667 m... 1X  ONCE

IV  Last administered on 3/25/20at 12:51;  Start 3/25/20 at 13:00;  Stop 3/25/20

at 15:03;  Status DC


Sodium Chloride 90 meq/Potassium Chloride 15 meq/ Potassium Phosphate 18 mmol/ 

Magnesium Sulfate 8 meq/Calcium Gluconate 15 meq/ Multivitamins 10 ml/Chromium/ 

Copper/Manganese/ Seleni/Zn 0.5 ml/ Total Parenteral Nutrition/Amino 

Acids/Dextrose/ Fat Emulsion Intravenous 1,400 ml @  58.333 mls/ hr TPN  CONT IV

 Last administered on 3/25/20at 22:16;  Start 3/25/20 at 22:00;  Stop 3/26/20 at

21:59;  Status DC


Potassium Chloride 20 meq/ Bicarbonate Dialysis Soln w/ out KCl 5,010 ml @  

1,000 mls/hr Q5H1M IV  Last administered on 3/29/20at 14:54;  Start 3/25/20 at 

16:00;  Stop 3/29/20 at 19:59;  Status DC


Multi-Ingred Cream/Lotion/Oil/ Oint (Artificial Tears Eye Ointment) 1 thomas PRN 

Q1HR  PRN OU DRY EYE, 2nd choice Last administered on 4/13/20at 08:19;  Start 

3/25/20 at 17:30


Sodium Chloride 90 meq/Potassium Chloride 15 meq/ Potassium Phosphate 18 mmol/ 

Magnesium Sulfate 8 meq/Calcium Gluconate 15 meq/ Multivitamins 10 ml/Chromium/ 

Copper/Manganese/ Seleni/Zn 0.5 ml/ Total Parenteral Nutrition/Amino 

Acids/Dextrose/ Fat Emulsion Intravenous 1,400 ml @  58.333 mls/ hr TPN  CONT IV

 Last administered on 3/26/20at 22:00;  Start 3/26/20 at 22:00;  Stop 3/27/20 at

21:59;  Status DC


Albumin Human 500 ml @  125 mls/hr 1X  ONCE IV ;  Start 3/26/20 at 14:15;  Stop 

3/26/20 at 18:14;  Status DC


Sodium Chloride 90 meq/Potassium Chloride 15 meq/ Potassium Phosphate 18 mmol/ 

Magnesium Sulfate 8 meq/Calcium Gluconate 15 meq/ Multivitamins 10 ml/Chromium/ 

Copper/Manganese/ Seleni/Zn 0.5 ml/ Insulin Human Regular 10 unit/ Total 

Parenteral Nutrition/Amino Acids/Dextrose/ Fat Emulsion Intravenous 1,400 ml @  

58.333 mls/ hr TPN  CONT IV  Last administered on 3/27/20at 21:43;  Start 

3/27/20 at 22:00;  Stop 3/28/20 at 21:59;  Status DC


Lidocaine HCl (Buffered Lidocaine 1%) 3 ml STK-MED ONCE .ROUTE ;  Start 3/25/20 

at 10:00;  Stop 3/27/20 at 13:57;  Status DC


Midazolam HCl 100 mg/Sodium Chloride 100 ml @ 7 mls/hr CONT  PRN IV SEE PROTOCOL

Last administered on 4/8/20at 15:35;  Start 3/28/20 at 16:00


Sodium Chloride 90 meq/Potassium Chloride 15 meq/ Potassium Phosphate 18 mmol/ 

Magnesium Sulfate 8 meq/Calcium Gluconate 15 meq/ Multivitamins 10 ml/Chromium/ 

Copper/Manganese/ Seleni/Zn 0.5 ml/ Insulin Human Regular 15 unit/ Total 

Parenteral Nutrition/Amino Acids/Dextrose/ Fat Emulsion Intravenous 1,400 ml @  

58.333 mls/ hr TPN  CONT IV  Last administered on 3/28/20at 20:34;  Start 

3/28/20 at 22:00;  Stop 3/29/20 at 21:59;  Status DC


Info ([facility] Electrolyte Protocol) 1 ea CONT PRN  PRN MC PER PROTOCOL;  Start 

3/29/20 at 13:15


Sodium Chloride 90 meq/Potassium Chloride 15 meq/ Potassium Phosphate 18 mmol/ 

Magnesium Sulfate 8 meq/Calcium Gluconate 15 meq/ Multivitamins 10 ml/Chromium/ 

Copper/Manganese/ Seleni/Zn 0.5 ml/ Insulin Human Regular 15 unit/ Total 

Parenteral Nutrition/Amino Acids/Dextrose/ Fat Emulsion Intravenous 1,400 ml @  

58.333 mls/ hr TPN  CONT IV  Last administered on 3/29/20at 22:05;  Start 

3/29/20 at 22:00;  Stop 3/30/20 at 21:59;  Status DC


Potassium Chloride 15 meq/ Bicarbonate Dialysis Soln w/ out KCl 5,007.5 ml  @ 

1,000 mls/ hr Q5H1M IV  Last administered on 4/1/20at 18:14;  Start 3/29/20 at 

20:00;  Stop 4/2/20 at 13:08;  Status DC


Potassium Chloride 15 meq/ Bicarbonate Dialysis Soln w/ out KCl 5,007.5 ml  @ 1

,000 mls/ hr Q5H1M IV  Last administered on 4/1/20at 18:14;  Start 3/29/20 at 

20:00;  Stop 4/2/20 at 13:08;  Status DC


Potassium Chloride 15 meq/ Bicarbonate Dialysis Soln w/ out KCl 5,007.5 ml  @ 

1,000 mls/ hr Q5H1M IV  Last administered on 4/1/20at 18:14;  Start 3/29/20 at 

20:00;  Stop 4/2/20 at 13:08;  Status DC


Iohexol (Omnipaque 240 Mg/ml) 30 ml 1X  ONCE PO  Last administered on 3/30/20at 

11:30;  Start 3/30/20 at 11:30;  Stop 3/30/20 at 11:33;  Status DC


Info (CONTRAST GIVEN -- Rx MONITORING) 1 each PRN DAILY  PRN MC SEE COMMENTS;  

Start 3/30/20 at 11:45;  Stop 4/1/20 at 11:44;  Status DC


Sodium Chloride 90 meq/Potassium Chloride 15 meq/ Potassium Phosphate 18 mmol/ 

Magnesium Sulfate 8 meq/Calcium Gluconate 15 meq/ Multivitamins 10 ml/Chromium/ 

Copper/Manganese/ Seleni/Zn 0.5 ml/ Insulin Human Regular 15 unit/ Total 

Parenteral Nutrition/Amino Acids/Dextrose/ Fat Emulsion Intravenous 1,400 ml @  

58.333 mls/ hr TPN  CONT IV  Last administered on 3/30/20at 21:47;  Start 

3/30/20 at 22:00;  Stop 3/31/20 at 21:59;  Status DC


Sodium Chloride 90 meq/Potassium Chloride 15 meq/ Potassium Phosphate 18 mmol/ 

Magnesium Sulfate 8 meq/Calcium Gluconate 15 meq/ Multivitamins 10 ml/Chromium/ 

Copper/Manganese/ Seleni/Zn 0.5 ml/ Insulin Human Regular 20 unit/ Total 

Parenteral Nutrition/Amino Acids/Dextrose/ Fat Emulsion Intravenous 1,400 ml @  

58.333 mls/ hr TPN  CONT IV  Last administered on 3/31/20at 21:36;  Start 

3/31/20 at 22:00;  Stop 4/1/20 at 21:59;  Status DC


Alteplase, Recombinant (Cathflo For Central Catheter Clearance) 1 mg 1X  ONCE 

INT CAT  Last administered on 3/31/20at 20:03;  Start 3/31/20 at 19:30;  Stop 

3/31/20 at 19:46;  Status DC


Alteplase, Recombinant (Cathflo For Central Catheter Clearance) 1 mg 1X  ONCE 

INT CAT  Last administered on 3/31/20at 22:05;  Start 3/31/20 at 22:00;  Stop 

3/31/20 at 22:01;  Status DC


Sodium Chloride 90 meq/Potassium Chloride 15 meq/ Potassium Phosphate 18 mmol/ 

Magnesium Sulfate 8 meq/Calcium Gluconate 15 meq/ Multivitamins 10 ml/Chromium/ 

Copper/Manganese/ Seleni/Zn 0.5 ml/ Insulin Human Regular 20 unit/ Total 

Parenteral Nutrition/Amino Acids/Dextrose/ Fat Emulsion Intravenous 1,400 ml @  

58.333 mls/ hr TPN  CONT IV  Last administered on 4/1/20at 21:30;  Start 4/1/20 

at 22:00;  Stop 4/2/20 at 21:59;  Status DC


Dexmedetomidine HCl 400 mcg/ Sodium Chloride 100 ml @ 0 mls/hr CONT  PRN IV 

ANXIETY / AGITATION Last administered on 4/13/20at 06:04;  Start 4/2/20 at 08:15


Sodium Chloride 500 ml @  500 mls/hr 1X PRN  PRN IV ELEVATED BP, SEE COMMENTS;  

Start 4/2/20 at 08:15


Atropine Sulfate (ATROPINE 0.5mg SYRINGE) 0.5 mg PRN Q5MIN  PRN IV SEE COMMENTS;

 Start 4/2/20 at 08:15


Furosemide (Lasix) 20 mg 1X  ONCE IVP  Last administered on 4/2/20at 08:19;  

Start 4/2/20 at 08:15;  Stop 4/2/20 at 08:16;  Status DC


Lidocaine HCl (Buffered Lidocaine 1%) 3 ml STK-MED ONCE .ROUTE ;  Start 4/2/20 

at 08:39;  Stop 4/2/20 at 08:39;  Status DC


Lidocaine HCl (Buffered Lidocaine 1%) 6 ml 1X  ONCE INJ  Last administered on 

4/2/20at 09:05;  Start 4/2/20 at 09:00;  Stop 4/2/20 at 09:06;  Status DC


Sodium Chloride 90 meq/Potassium Chloride 15 meq/ Potassium Phosphate 18 mmol/ 

Magnesium Sulfate 8 meq/Calcium Gluconate 15 meq/ Multivitamins 10 ml/Chromium/ 

Copper/Manganese/ Seleni/Zn 0.5 ml/ Insulin Human Regular 20 unit/ Total 

Parenteral Nutrition/Amino Acids/Dextrose/ Fat Emulsion Intravenous 1,400 ml @  

58.333 mls/ hr TPN  CONT IV  Last administered on 4/2/20at 22:45;  Start 4/2/20 

at 22:00;  Stop 4/3/20 at 21:59;  Status DC


Sodium Chloride 1,000 ml @  1,000 mls/hr Q1H PRN IV hypotension;  Start 4/3/20 

at 07:30;  Stop 4/3/20 at 13:29;  Status DC


Albumin Human 200 ml @  200 mls/hr 1X PRN  PRN IV Hypotension Last administered 

on 4/3/20at 09:36;  Start 4/3/20 at 07:30;  Stop 4/3/20 at 13:29;  Status DC


Sodium Chloride (Normal Saline Flush) 10 ml 1X PRN  PRN IV AP catheter pack;  

Start 4/3/20 at 07:30;  Stop 4/3/20 at 21:29;  Status DC


Sodium Chloride (Normal Saline Flush) 10 ml 1X PRN  PRN IV  catheter pack;  

Start 4/3/20 at 07:30;  Stop 4/4/20 at 07:29;  Status DC


Sodium Chloride 1,000 ml @  400 mls/hr Q2H30M PRN IV PATENCY;  Start 4/3/20 at 

07:30;  Stop 4/3/20 at 19:29;  Status DC


Info (PHARMACY MONITORING -- do not chart) 1 each PRN DAILY  PRN MC SEE 

COMMENTS;  Start 4/3/20 at 07:30;  Stop 4/3/20 at 13:02;  Status DC


Info (PHARMACY MONITORING -- do not chart) 1 each PRN DAILY  PRN MC SEE 

COMMENTS;  Start 4/3/20 at 07:30;  Stop 4/5/20 at 12:45;  Status DC


Sodium Chloride 90 meq/Potassium Chloride 15 meq/ Potassium Phosphate 10 mmol/ 

Magnesium Sulfate 8 meq/Calcium Gluconate 15 meq/ Multivitamins 10 ml/Chromium/ 

Copper/Manganese/ Seleni/Zn 0.5 ml/ Insulin Human Regular 25 unit/ Total 

Parenteral Nutrition/Amino Acids/Dextrose/ Fat Emulsion Intravenous 1,400 ml @  

58.333 mls/ hr TPN  CONT IV  Last administered on 4/3/20at 22:19;  Start 4/3/20 

at 22:00;  Stop 4/4/20 at 21:59;  Status DC


Heparin Sodium (Porcine) (Heparin Sodium) 5,000 unit Q12HR SQ  Last administered

on 4/13/20at 08:14;  Start 4/3/20 at 21:00


Ondansetron HCl (Zofran) 4 mg PRN Q6HRS  PRN IV NAUSEA/VOMITING;  Start 4/6/20 

at 07:00;  Stop 4/7/20 at 06:59;  Status DC


Fentanyl Citrate (Fentanyl 2ml Vial) 25 mcg PRN Q5MIN  PRN IV MILD PAIN 1-3;  

Start 4/6/20 at 07:00;  Stop 4/7/20 at 06:59;  Status DC


Fentanyl Citrate (Fentanyl 2ml Vial) 50 mcg PRN Q5MIN  PRN IV MODERATE TO SEVERE

PAIN;  Start 4/6/20 at 07:00;  Stop 4/7/20 at 06:59;  Status DC


Ringer's Solution 1,000 ml @  30 mls/hr Q24H IV ;  Start 4/6/20 at 07:00;  Stop 

4/6/20 at 18:59;  Status DC


Lidocaine HCl (Xylocaine-Mpf 1% 2ml Vial) 2 ml PRN 1X  PRN ID PRIOR TO IV START;

 Start 4/6/20 at 07:00;  Stop 4/7/20 at 06:59;  Status DC


Prochlorperazine Edisylate (Compazine) 5 mg PACU PRN  PRN IV NAUSEA, MRX1;  

Start 4/6/20 at 07:00;  Stop 4/7/20 at 06:59;  Status DC


Sodium Chloride 1,000 ml @  1,000 mls/hr Q1H PRN IV hypotension;  Start 4/4/20 

at 09:10;  Stop 4/4/20 at 15:09;  Status DC


Albumin Human 200 ml @  200 mls/hr 1X PRN  PRN IV Hypotension Last administered 

on 4/4/20at 10:10;  Start 4/4/20 at 09:15;  Stop 4/4/20 at 15:14;  Status DC


Sodium Chloride 1,000 ml @  400 mls/hr Q2H30M PRN IV PATENCY;  Start 4/4/20 at 

09:10;  Stop 4/4/20 at 21:09;  Status DC


Info (PHARMACY MONITORING -- do not chart) 1 each PRN DAILY  PRN MC SEE 

COMMENTS;  Start 4/4/20 at 09:15;  Stop 4/5/20 at 12:45;  Status DC


Info (PHARMACY MONITORING -- do not chart) 1 each PRN DAILY  PRN MC SEE COM

MENTS;  Start 4/4/20 at 09:15;  Stop 4/5/20 at 12:45;  Status DC


Sodium Chloride 90 meq/Potassium Chloride 15 meq/ Potassium Phosphate 10 mmol/ 

Magnesium Sulfate 8 meq/Calcium Gluconate 15 meq/ Multivitamins 10 ml/Chromium/ 

Copper/Manganese/ Seleni/Zn 0.5 ml/ Insulin Human Regular 25 unit/ Total 

Parenteral Nutrition/Amino Acids/Dextrose/ Fat Emulsion Intravenous 1,400 ml @  

58.333 mls/ hr TPN  CONT IV  Last administered on 4/4/20at 22:10;  Start 4/4/20 

at 22:00;  Stop 4/5/20 at 21:59;  Status DC


Magnesium Sulfate 50 ml @ 25 mls/hr PRN DAILY  PRN IV for Mag < 1.7 on am labs; 

Start 4/5/20 at 09:15


Sodium Chloride 90 meq/Potassium Chloride 15 meq/ Potassium Phosphate 10 mmol/ 

Magnesium Sulfate 8 meq/Calcium Gluconate 15 meq/ Multivitamins 10 ml/Chromium/ 

Copper/Manganese/ Seleni/Zn 0.5 ml/ Insulin Human Regular 25 unit/ Total 

Parenteral Nutrition/Amino Acids/Dextrose/ Fat Emulsion Intravenous 1,400 ml @  

58.333 mls/ hr TPN  CONT IV  Last administered on 4/5/20at 21:20;  Start 4/5/20 

at 22:00;  Stop 4/6/20 at 21:59;  Status DC


Sodium Chloride 1,000 ml @  1,000 mls/hr Q1H PRN IV hypotension;  Start 4/5/20 

at 12:23;  Stop 4/5/20 at 18:22;  Status DC


Albumin Human 200 ml @  200 mls/hr 1X  ONCE IV  Last administered on 4/5/20at 

13:34;  Start 4/5/20 at 12:30;  Stop 4/5/20 at 13:29;  Status DC


Diphenhydramine HCl (Benadryl) 25 mg 1X PRN  PRN IV ITCHING;  Start 4/5/20 at 

12:30;  Stop 4/6/20 at 12:29;  Status DC


Diphenhydramine HCl (Benadryl) 25 mg 1X PRN  PRN IV ITCHING;  Start 4/5/20 at 

12:30;  Stop 4/6/20 at 12:29;  Status DC


Info (PHARMACY MONITORING -- do not chart) 1 each PRN DAILY  PRN MC SEE 

COMMENTS;  Start 4/5/20 at 12:30;  Status Cancel


Bupivacaine HCl/ Epinephrine Bitart (Sensorcain-Epi 0.5%-1:[id number] Mpf) 30 ml 

STK-MED ONCE .ROUTE  Last administered on 4/6/20at 11:44;  Start 4/6/20 at 

11:00;  Stop 4/6/20 at 11:01;  Status DC


Cellulose (Surgicel Fibrillar 1x2) 1 each STK-MED ONCE .ROUTE ;  Start 4/6/20 at

11:00;  Stop 4/6/20 at 11:01;  Status DC


Sodium Chloride 90 meq/Potassium Chloride 15 meq/ Potassium Phosphate 10 mmol/ 

Magnesium Sulfate 12 meq/Calcium Gluconate 15 meq/ Multivitamins 10 ml/Chromium/

Copper/Manganese/ Seleni/Zn 0.5 ml/ Insulin Human Regular 25 unit/ Total 

Parenteral Nutrition/Amino Acids/Dextrose/ Fat Emulsion Intravenous 1,400 ml @  

58.333 mls/ hr TPN  CONT IV  Last administered on 4/6/20at 22:24;  Start 4/6/20 

at 22:00;  Stop 4/7/20 at 21:59;  Status DC


Propofol 20 ml @ As Directed STK-MED ONCE IV ;  Start 4/6/20 at 11:07;  Stop 

4/6/20 at 11:07;  Status DC


Cellulose (Surgicel Hemostat 4x8) 1 each STK-MED ONCE .ROUTE  Last administered 

on 4/6/20at 11:44;  Start 4/6/20 at 11:55;  Stop 4/6/20 at 11:56;  Status DC


Sevoflurane (Ultane) 60 ml STK-MED ONCE IH ;  Start 4/6/20 at 12:46;  Stop 

4/6/20 at 12:46;  Status DC


Sodium Chloride 1,000 ml @  1,000 mls/hr Q1H PRN IV hypotension;  Start 4/6/20 

at 13:51;  Stop 4/6/20 at 19:50;  Status DC


Albumin Human 200 ml @  200 mls/hr 1X PRN  PRN IV Hypotension Last administered 

on 4/6/20at 14:51;  Start 4/6/20 at 14:00;  Stop 4/6/20 at 19:59;  Status DC


Diphenhydramine HCl (Benadryl) 25 mg 1X PRN  PRN IV ITCHING;  Start 4/6/20 at 

14:00;  Stop 4/7/20 at 13:59;  Status DC


Diphenhydramine HCl (Benadryl) 25 mg 1X PRN  PRN IV ITCHING;  Start 4/6/20 at 

14:00;  Stop 4/7/20 at 13:59;  Status DC


Sodium Chloride 1,000 ml @  400 mls/hr Q2H30M PRN IV PATENCY;  Start 4/6/20 at 

13:51;  Stop 4/7/20 at 01:50;  Status DC


Info (PHARMACY MONITORING -- do not chart) 1 each PRN DAILY  PRN MC SEE 

COMMENTS;  Start 4/6/20 at 14:00;  Stop 4/9/20 at 08:16;  Status DC


Heparin Sodium (Porcine) (Hep Lock Adult) 500 unit STK-MED ONCE IVP ;  Start 

4/7/20 at 09:29;  Stop 4/7/20 at 09:30;  Status DC


Sodium Chloride 1,000 ml @  1,000 mls/hr Q1H PRN IV hypotension;  Start 4/7/20 

at 10:43;  Stop 4/7/20 at 16:42;  Status DC


Sodium Chloride 1,000 ml @  400 mls/hr Q2H30M PRN IV PATENCY;  Start 4/7/20 at 

10:43;  Stop 4/7/20 at 22:42;  Status DC


Info (PHARMACY MONITORING -- do not chart) 1 each PRN DAILY  PRN MC SEE 

COMMENTS;  Start 4/7/20 at 10:45;  Status UNV


Info (PHARMACY MONITORING -- do not chart) 1 each PRN DAILY  PRN MC SEE 

COMMENTS;  Start 4/7/20 at 10:45;  Status UNV


Sodium Chloride 90 meq/Potassium Chloride 15 meq/ Magnesium Sulfate 12 

meq/Calcium Gluconate 15 meq/ Multivitamins 10 ml/Chromium/ Copper/Manganese/ 

Seleni/Zn 0.5 ml/ Insulin Human Regular 25 unit/ Total Parenteral 

Nutrition/Amino Acids/Dextrose/ Fat Emulsion Intravenous 1,400 ml @  58.333 mls/

hr TPN  CONT IV  Last administered on 4/7/20at 22:13;  Start 4/7/20 at 22:00;  

Stop 4/8/20 at 21:59;  Status DC


Sodium Chloride 1,000 ml @  1,000 mls/hr Q1H PRN IV hypotension;  Start 4/8/20 

at 07:50;  Stop 4/8/20 at 13:49;  Status DC


Albumin Human 200 ml @  200 mls/hr 1X  ONCE IV ;  Start 4/8/20 at 08:00;  Stop 

4/8/20 at 08:53;  Status DC


Diphenhydramine HCl (Benadryl) 25 mg 1X PRN  PRN IV ITCHING;  Start 4/8/20 at 

08:00;  Stop 4/9/20 at 07:59;  Status DC


Diphenhydramine HCl (Benadryl) 25 mg 1X PRN  PRN IV ITCHING;  Start 4/8/20 at 

08:00;  Stop 4/9/20 at 07:59;  Status DC


Info (PHARMACY MONITORING -- do not chart) 1 each PRN DAILY  PRN MC SEE VITO

NTS;  Start 4/8/20 at 08:00;  Stop 4/9/20 at 08:16;  Status DC


Albumin Human 50 ml @ 50 mls/hr 1X  ONCE IV ;  Start 4/8/20 at 08:53;  Stop 

4/8/20 at 08:56;  Status DC


Albumin Human 200 ml @  50 mls/hr PRN 1X  PRN IV HYPOTENSION Last administered 

on 4/8/20at 09:09;  Start 4/8/20 at 09:00


Meropenem 500 mg/ Sodium Chloride 50 ml @  100 mls/hr Q12H IV  Last administered

on 4/12/20at 21:48;  Start 4/8/20 at 10:00


Sodium Chloride 90 meq/Magnesium Sulfate 12 meq/ Calcium Gluconate 15 meq/ 

Multivitamins 10 ml/Chromium/ Copper/Manganese/ Seleni/Zn 0.5 ml/ Insulin Human 

Regular 25 unit/ Total Parenteral Nutrition/Amino Acids/Dextrose/ Fat Emulsion 

Intravenous 1,400 ml @  58.333 mls/ hr TPN  CONT IV  Last administered on 

4/8/20at 21:41;  Start 4/8/20 at 22:00;  Stop 4/9/20 at 21:59;  Status DC


Sodium Chloride 1,000 ml @  1,000 mls/hr Q1H PRN IV hypotension;  Start 4/9/20 

at 07:58;  Stop 4/9/20 at 13:57;  Status DC


Albumin Human 200 ml @  200 mls/hr 1X PRN  PRN IV Hypotension Last administered 

on 4/9/20at 09:30;  Start 4/9/20 at 08:00;  Stop 4/9/20 at 13:59;  Status DC


Sodium Chloride 1,000 ml @  400 mls/hr Q2H30M PRN IV PATENCY;  Start 4/9/20 at 

07:58;  Stop 4/9/20 at 19:57;  Status DC


Info (PHARMACY MONITORING -- do not chart) 1 each PRN DAILY  PRN MC SEE 

COMMENTS;  Start 4/9/20 at 08:00


Info (PHARMACY MONITORING -- do not chart) 1 each PRN DAILY  PRN MC SEE 

COMMENTS;  Start 4/9/20 at 08:15;  Status UNV


Sodium Chloride 90 meq/Potassium Phosphate 5 mmol/ Magnesium Sulfate 12 

meq/Calcium Gluconate 15 meq/ Multivitamins 10 ml/Chromium/ Copper/Manganese/ 

Seleni/Zn 0.5 ml/ Insulin Human Regular 30 unit/ Total Parenteral 

Nutrition/Amino Acids/Dextrose/ Fat Emulsion Intravenous 1,400 ml @  58.333 mls/

hr TPN  CONT IV  Last administered on 4/9/20at 22:08;  Start 4/9/20 at 22:00;  

Stop 4/10/20 at 21:59;  Status DC


Linezolid/Dextrose 300 ml @  300 mls/hr Q12HR IV  Last administered on 4/12/20at

21:48;  Start 4/10/20 at 11:00


Sodium Chloride 90 meq/Potassium Phosphate 15 mmol/ Magnesium Sulfate 12 

meq/Calcium Gluconate 15 meq/ Multivitamins 10 ml/Chromium/ Copper/Manganese/ 

Seleni/Zn 0.5 ml/ Insulin Human Regular 30 unit/ Total Parenteral 

Nutrition/Amino Acids/Dextrose/ Fat Emulsion Intravenous 1,400 ml @  58.333 mls/

hr TPN  CONT IV  Last administered on 4/10/20at 21:49;  Start 4/10/20 at 22:00; 

Stop 4/11/20 at 21:59;  Status DC


Sodium Chloride 90 meq/Potassium Phosphate 15 mmol/ Magnesium Sulfate 12 meq/[place]

cium Gluconate 15 meq/ Multivitamins 10 ml/Chromium/ Copper/Manganese/ Seleni/Zn

0.5 ml/ Insulin Human Regular 40 unit/ Total Parenteral Nutrition/Amino 

Acids/Dextrose/ Fat Emulsion Intravenous 1,400 ml @  58.333 mls/ hr TPN  CONT IV

 Last administered on 4/11/20at 21:21;  Start 4/11/20 at 22:00;  Stop 4/12/20 at

21:59;  Status DC


Sodium Chloride 1,000 ml @  1,000 mls/hr Q1H PRN IV hypotension;  Start 4/11/20 

at 13:26;  Stop 4/11/20 at 19:25;  Status DC


Albumin Human 200 ml @  200 mls/hr 1X PRN  PRN IV Hypotension Last administered 

on 4/11/20at 15:00;  Start 4/11/20 at 13:30;  Stop 4/11/20 at 19:29;  Status DC


Sodium Chloride (Normal Saline Flush) 10 ml 1X PRN  PRN IV AP catheter pack;  

Start 4/11/20 at 13:30;  Stop 4/12/20 at 13:29;  Status DC


Sodium Chloride (Normal Saline Flush) 10 ml 1X PRN  PRN IV  catheter pack;  

Start 4/11/20 at 13:30;  Stop 4/12/20 at 13:29;  Status DC


Sodium Chloride 1,000 ml @  400 mls/hr Q2H30M PRN IV PATENCY;  Start 4/11/20 at 

13:26;  Stop 4/12/20 at 01:25;  Status DC


Info (PHARMACY MONITORING -- do not chart) 1 each PRN DAILY  PRN MC SEE 

COMMENTS;  Start 4/11/20 at 13:30;  Stop 4/11/20 at 13:33;  Status DC


Info (PHARMACY MONITORING -- do not chart) 1 each PRN DAILY  PRN MC SEE 

COMMENTS;  Start 4/11/20 at 13:30;  Stop 4/11/20 at 13:34;  Status DC


Sodium Chloride 90 meq/Potassium Phosphate 19 mmol/ Magnesium Sulfate 12 

meq/Calcium Gluconate 15 meq/ Multivitamins 10 ml/Chromium/ Copper/Manganese/ 

Seleni/Zn 0.5 ml/ Insulin Human Regular 40 unit/ Total Parenteral 

Nutrition/Amino Acids/Dextrose/ Fat Emulsion Intravenous 1,400 ml @  58.333 mls/

hr TPN  CONT IV  Last administered on 4/12/20at 21:54;  Start 4/12/20 at 22:00; 

Stop 4/13/20 at 21:59





Active Scripts


Active


Reported


Bisoprolol Fumarate 5 Mg Tablet 10 Mg PO DAILY


Vitals/I & O





Vital Sign - Last 24 Hours








 4/12/20 4/12/20 4/12/20 4/12/20





 09:00 10:00 11:00 11:48


 


Temp  99.1  





  99.1  


 


Pulse 112 112 104 


 


Resp 22 32 25 


 


B/P (MAP) 108/56 (73) 121/67 (85) 108/57 (74) 


 


Pulse Ox 99 100 98 98


 


O2 Delivery Ventilator Ventilator Ventilator Ventilator


 


    





    





 4/12/20 4/12/20 4/12/20 4/12/20





 12:00 12:00 13:00 13:42


 


Temp 98.8   





 98.8   


 


Pulse 106  117 


 


Resp 21  22 


 


B/P (MAP) 116/64 (81)  118/68 (85) 


 


Pulse Ox 98  100 99


 


O2 Delivery Ventilator Mechanical Ventilator Ventilator Ventilator


 


    





    





 4/12/20 4/12/20 4/12/20 4/12/20





 14:00 15:00 15:27 16:00


 


Temp    100.0





    100.0


 


Pulse 112 98  120


 


Resp 22 22  22


 


B/P (MAP) 132/78 (96) 108/61 (77)  113/74 (87)


 


Pulse Ox 100 100 98 100


 


O2 Delivery Ventilator Ventilator Ventilator Ventilator


 


    





    





 4/12/20 4/12/20 4/12/20 4/12/20





 16:00 17:00 17:02 17:32


 


Pulse  126  


 


Resp  22 23 21


 


B/P (MAP)  131/69 (89)  


 


Pulse Ox  100 100 100


 


O2 Delivery Mechanical Ventilator Ventilator Ventilator Ventilator





 4/12/20 4/12/20 4/12/20 4/12/20





 18:00 18:03 19:00 20:00


 


Temp    99.5





    99.5


 


Pulse 100  112 113


 


Resp 22  18 18


 


B/P (MAP) 133/80 (97)  90/55 (67) 129/81 (97)


 


Pulse Ox 100 100 99 99


 


O2 Delivery Ventilator Ventilator Ventilator Ventilator


 


    





    





 4/12/20 4/12/20 4/12/20 4/12/20





 20:00 20:00 21:00 22:00


 


Pulse   120 130


 


Resp   18 18


 


B/P (MAP)   142/73 (96) 128/62 (84)


 


Pulse Ox  100 98 99


 


O2 Delivery Mechanical Ventilator  Ventilator Ventilator





 4/12/20 4/12/20 4/13/20 4/13/20





 23:00 23:45 00:00 00:00


 


Temp   99.4 





   99.4 


 


Pulse 98  128 


 


Resp 18  18 


 


B/P (MAP) 97/50 (66)  154/97 (116) 


 


Pulse Ox 98 100 100 


 


O2 Delivery Ventilator Ventilator Ventilator Mechanical Ventilator


 


    





    





 4/13/20 4/13/20 4/13/20 4/13/20





 00:14 00:51 01:00 02:00


 


Pulse   115 124


 


Resp 18 18 18 18


 


B/P (MAP)   140/66 (90) 153/87 (109)


 


Pulse Ox 100 100 100 98


 


O2 Delivery Ventilator Ventilator Ventilator Ventilator





 4/13/20 4/13/20 4/13/20 4/13/20





 03:00 04:00 04:00 04:30


 


Temp  98.8  





  98.8  


 


Pulse 88 125  


 


Resp 18 18  


 


B/P (MAP) 96/51 (66) 106/55 (72)  


 


Pulse Ox 98 100  100


 


O2 Delivery Ventilator Ventilator Mechanical Ventilator Ventilator


 


    





    





 4/13/20 4/13/20 4/13/20 4/13/20





 05:00 06:00 07:00 07:09


 


Temp   98.3 





   98.3 


 


Pulse 110 86 94 


 


Resp 18 18 18 18


 


B/P (MAP) 122/64 (83) 113/71 (85) 93/53 (66) 


 


Pulse Ox 99 99 99 99


 


O2 Delivery Ventilator Ventilator Ventilator Ventilator


 


    





    





 4/13/20 4/13/20 4/13/20 





 07:42 07:54 08:00 


 


Pulse   122 


 


Resp 19  28 


 


B/P (MAP)   125/71 (89) 


 


Pulse Ox 99  100 


 


O2 Delivery Ventilator Mechanical Ventilator Ventilator 














Intake and Output   


 


 4/12/20 4/12/20 4/13/20





 15:00 23:00 07:00


 


Intake Total  1296 ml 1433.0 ml


 


Output Total 85 ml 620 ml 261 ml


 


Balance -85 ml 676 ml 1172.0 ml

















CLEMENTINA PANTOJA MD           Apr 13, 2020 08:52

## 2020-04-13 NOTE — RAD
AP chest x-ray

 

HISTORY: Shortness of breath.

 

COMPARISON: Chest x-ray April 9, 2020.

 

FINDINGS: Tracheostomy. Left subclavian central venous catheter tip distal

SVC. Right PICC line tip SVC. Right jugular dialysis catheter tip lower 

right atrium although could extend to the atrial junction with the IVC. 

Heart size stable. No pneumothorax. Hyperexpansion of the lungs. Perihilar

and lower lobe opacities obscuring the diaphragms likely due to mild left 

and mild basilar right layering pleural effusions. This is grossly stable.

 

IMPRESSION: Lines and tubes as described above. No pneumothorax. Perihilar

and lower lobe opacities likely edema and right greater than left layering

pleural effusions as described above. Superimposed lower lobe pneumonia 

not excluded.

 

Electronically signed by: Farhat Wallace MD (4/13/2020 7:56 AM) 

[id number]

## 2020-04-13 NOTE — PDOC
PROGRESS NOTES


Chief Complaint


Chief Complaint


Respiratory failure requiring mechanical ventilation (on vent since 3/23)


bilateral pleural effusions/pulm edema 


Sepsis


Severe Acute gallstone pancreatitis (not a surgical candidate at this time) with

necrosis


Acute kidney failure now requiring dialysis


Salpingitis


Gallstones (Calculus of gallbladder with acute cholecystitis without 

obstruction)


HTN 


Leukocytosis 


Hypoxia


Uterine fibroid


Intractable pain


Intractable nausea


Covid 19 negative. 


Acute on chronic anemia 


EEG: No seizure activity.


ESRD on HD


Hyperglycemia, persistently in 200s





History of Present Illness


History of Present Illness


Ms Tadeo is a 48yo F w/ PMHx HTN, prediabetes who presented to the emergency 

room with complaints of abdominal pain on 3/16/2020. Found with Lipase 15691, 

, , Bilirubin 1.4.


CT abdomen confirms pancreatic inflammation, peripancreatic fluid and 

inflammatory changes around the pancreas consistent with pancreatitis. 

Cholelithiasis and 1.4cm uterine fibroid as well as possible left salpingitis. 

Admitted for further care


GI, General surgery, ID, Pulm consulted.





3/17: No urine output. Added dilaudid for pain, PICC placed per IR. Renal US 

negative.Seen bedside in ICU, given 2L additional NSS and albumin infusion. 

Still hypotensive, started on levophed. Repeat CT abdomen with necrosis. 


3/18: O2 saturation 87% on nasal cannula oxygen. Dialysis catheter per 

nephrology


3/19: She is now on BiPAP appears more ill, now on dialysis


3/20: Seen on BiPAP. Her mother and another family member are present and seemed

to be good support for her. Currently on dialysis. Appears critically ill


3/21: Overnight Tmax 101.7 , still on BiPAP FiO2 40%, still on low dose Levophed

gtt, TPN initiated. On dialysis


4/6: Tracheostomy


4/12: S/p tracheostomy on vent spontaneous respirations with 5 of pressure 

support 35% FiO2, rectal tube and a [place], off pressors





Off pressors. Seen on dialysis this morning. BUN 80. Tracking with her eyes. 

Still on vent via trach.





Vitals


Vitals





Vital Signs








  Date Time  Temp Pulse Resp B/P (MAP) Pulse Ox O2 Delivery O2 Flow Rate FiO2


 


4/13/20 07:54      Mechanical Ventilator  


 


4/13/20 07:09   18  99   


 


4/13/20 07:00 98.3 94  93/53 (66)    





 98.3       











Physical Exam


General:  No acute distress


Heart:  Other (increased rate)


Lungs:  Other (dimished in BLL)


Abdomen:  Normal bowel sounds, Soft, No tenderness


Extremities:  No edema, Other (SOME CLUBBING )


Skin:  Other (mottling noted to extremities )





Labs


LABS





Laboratory Tests








Test


 4/12/20


12:00 4/12/20


12:42 4/12/20


17:53 4/13/20


00:02


 


Urine Collection Type Unknown    


 


Urine Color Brown    


 


Urine Clarity Cloudy    


 


Urine pH 5.0 (<5.0-8.0)    


 


Urine Specific Gravity


 >=1.030


(1.000-1.030) 


 


 





 


Urine Protein


 100 mg/dL


(NEG-TRACE) 


 


 





 


Urine Glucose (UA)


 100 mg/dL


(NEG) 


 


 





 


Urine Ketones (Stick)


 Trace mg/dL


(NEG) 


 


 





 


Urine Blood Large (NEG)    


 


Urine Nitrite Positive (NEG)    


 


Urine Bilirubin Small (NEG)    


 


Urine Urobilinogen Dipstick


 1.0 mg/dL (0.2


mg/dL) 


 


 





 


Urine Leukocyte Esterase Small (NEG)    


 


Urine RBC >40 /HPF (0-2)    


 


Urine WBC 1-4 /HPF (0-4)    


 


Urine Squamous Epithelial


Cells Occ /LPF 


 


 


 





 


Urine Transitional Epithelial


Cells Few /LPF 


 


 


 





 


Urine Amorphous Sediment Present /HPF    


 


Urine Bacteria


 Few /HPF


(0-FEW) 


 


 





 


Urine Hyaline Casts Few /HPF    


 


Urine Mucus Slight /LPF    


 


Glucose (Fingerstick)


 


 223 mg/dL


(70-99) 166 mg/dL


(70-99) 201 mg/dL


(70-99)


 


Test


 4/13/20


06:09 4/13/20


06:10 


 





 


Glucose (Fingerstick)


 175 mg/dL


(70-99) 


 


 





 


White Blood Count


 


 5.3 x10^3/uL


(4.0-11.0) 


 





 


Red Blood Count


 


 2.48 x10^6/uL


(3.50-5.40) 


 





 


Hemoglobin


 


 8.0 g/dL


(12.0-15.5) 


 





 


Hematocrit


 


 24.3 %


(36.0-47.0) 


 





 


Mean Corpuscular Volume  98 fL ()   


 


Mean Corpuscular Hemoglobin  32 pg (25-35)   


 


Mean Corpuscular Hemoglobin


Concent 


 33 g/dL


(31-37) 


 





 


Red Cell Distribution Width


 


 19.3 %


(11.5-14.5) 


 





 


Platelet Count


 


 318 x10^3/uL


(140-400) 


 





 


Neutrophils (%) (Auto)  76 % (31-73)   


 


Lymphocytes (%) (Auto)  10 % (24-48)   


 


Monocytes (%) (Auto)  12 % (0-9)   


 


Eosinophils (%) (Auto)  2 % (0-3)   


 


Basophils (%) (Auto)  0 % (0-3)   


 


Neutrophils # (Auto)


 


 4.0 x10^3/uL


(1.8-7.7) 


 





 


Lymphocytes # (Auto)


 


 0.5 x10^3/uL


(1.0-4.8) 


 





 


Monocytes # (Auto)


 


 0.6 x10^3/uL


(0.0-1.1) 


 





 


Eosinophils # (Auto)


 


 0.1 x10^3/uL


(0.0-0.7) 


 





 


Basophils # (Auto)


 


 0.0 x10^3/uL


(0.0-0.2) 


 





 


Sodium Level


 


 135 mmol/L


(136-145) 


 





 


Potassium Level


 


 3.6 mmol/L


(3.5-5.1) 


 





 


Chloride Level


 


 98 mmol/L


() 


 





 


Carbon Dioxide Level


 


 23 mmol/L


(21-32) 


 





 


Anion Gap  14 (6-14)   


 


Blood Urea Nitrogen


 


 80 mg/dL


(7-20) 


 





 


Creatinine


 


 2.1 mg/dL


(0.6-1.0) 


 





 


Estimated GFR


(Cockcroft-Gault) 


 25.0 


 


 





 


Glucose Level


 


 167 mg/dL


(70-99) 


 





 


Calcium Level


 


 9.0 mg/dL


(8.5-10.1) 


 





 


Magnesium Level


 


 2.2 mg/dL


(1.8-2.4) 


 





 


Triglycerides Level


 


 245 mg/dL


(0-150) 


 














Assessment and Plan


Assessmemt and Plan


Problems


Medical Problems:


(1) Acute pancreatitis


Status: Acute  





(2) Cholelithiasis


Status: Acute  











Comment


Review of Relevant


I have reviewed the following items josy (where applicable) has been applied.


Labs





Laboratory Tests








Test


 4/11/20


15:33 4/11/20


17:30 4/12/20


00:08 4/12/20


06:00


 


Glucose (Fingerstick)


 214 mg/dL


(70-99) 


 208 mg/dL


(70-99) 172 mg/dL


(70-99)


 


Hemoglobin A1c


 


 5.4 %


(4.8-5.6) 


 





 


White Blood Count


 


 


 


 6.6 x10^3/uL


(4.0-11.0)


 


Red Blood Count


 


 


 


 2.09 x10^6/uL


(3.50-5.40)


 


Hemoglobin


 


 


 


 6.8 g/dL


(12.0-15.5)


 


Hematocrit


 


 


 


 21.0 %


(36.0-47.0)


 


Mean Corpuscular Volume


 


 


 


 100 fL


()


 


Mean Corpuscular Hemoglobin    33 pg (25-35) 


 


Mean Corpuscular Hemoglobin


Concent 


 


 


 33 g/dL


(31-37)


 


Red Cell Distribution Width


 


 


 


 22.2 %


(11.5-14.5)


 


Platelet Count


 


 


 


 287 x10^3/uL


(140-400)


 


Neutrophils (%) (Auto)    74 % (31-73) 


 


Lymphocytes (%) (Auto)    10 % (24-48) 


 


Monocytes (%) (Auto)    11 % (0-9) 


 


Eosinophils (%) (Auto)    4 % (0-3) 


 


Basophils (%) (Auto)    1 % (0-3) 


 


Neutrophils # (Auto)


 


 


 


 4.9 x10^3/uL


(1.8-7.7)


 


Lymphocytes # (Auto)


 


 


 


 0.6 x10^3/uL


(1.0-4.8)


 


Monocytes # (Auto)


 


 


 


 0.7 x10^3/uL


(0.0-1.1)


 


Eosinophils # (Auto)


 


 


 


 0.3 x10^3/uL


(0.0-0.7)


 


Basophils # (Auto)


 


 


 


 0.0 x10^3/uL


(0.0-0.2)


 


Sodium Level


 


 


 


 135 mmol/L


(136-145)


 


Potassium Level


 


 


 


 3.6 mmol/L


(3.5-5.1)


 


Chloride Level


 


 


 


 99 mmol/L


()


 


Carbon Dioxide Level


 


 


 


 29 mmol/L


(21-32)


 


Anion Gap    7 (6-14) 


 


Blood Urea Nitrogen


 


 


 


 56 mg/dL


(7-20)


 


Creatinine


 


 


 


 1.9 mg/dL


(0.6-1.0)


 


Estimated GFR


(Cockcroft-Gault) 


 


 


 28.1 





 


Glucose Level


 


 


 


 174 mg/dL


(70-99)


 


Calcium Level


 


 


 


 8.9 mg/dL


(8.5-10.1)


 


Phosphorus Level


 


 


 


 2.9 mg/dL


(2.6-4.7)


 


Albumin


 


 


 


 2.9 g/dL


(3.4-5.0)


 


Test


 4/12/20


08:00 4/12/20


12:00 4/12/20


12:42 4/12/20


17:53


 


O2 Saturation 97 % (92-99)    


 


Arterial Blood pH


 7.47


(7.35-7.45) 


 


 





 


Arterial Blood pCO2 at


Patient Temp 34 mmHg


(35-46) 


 


 





 


Arterial Blood pO2 at Patient


Temp 95 mmHg


() 


 


 





 


Arterial Blood HCO3


 24 mmol/L


(21-28) 


 


 





 


Arterial Blood Base Excess


 1 mmol/L


(-3-3) 


 


 





 


FiO2 35    


 


Urine Collection Type  Unknown   


 


Urine Color  Brown   


 


Urine Clarity  Cloudy   


 


Urine pH  5.0 (<5.0-8.0)   


 


Urine Specific Gravity


 


 >=1.030


(1.000-1.030) 


 





 


Urine Protein


 


 100 mg/dL


(NEG-TRACE) 


 





 


Urine Glucose (UA)


 


 100 mg/dL


(NEG) 


 





 


Urine Ketones (Stick)


 


 Trace mg/dL


(NEG) 


 





 


Urine Blood  Large (NEG)   


 


Urine Nitrite  Positive (NEG)   


 


Urine Bilirubin  Small (NEG)   


 


Urine Urobilinogen Dipstick


 


 1.0 mg/dL (0.2


mg/dL) 


 





 


Urine Leukocyte Esterase  Small (NEG)   


 


Urine RBC  >40 /HPF (0-2)   


 


Urine WBC  1-4 /HPF (0-4)   


 


Urine Squamous Epithelial


Cells 


 Occ /LPF 


 


 





 


Urine Transitional Epithelial


Cells 


 Few /LPF 


 


 





 


Urine Amorphous Sediment  Present /HPF   


 


Urine Bacteria


 


 Few /HPF


(0-FEW) 


 





 


Urine Hyaline Casts  Few /HPF   


 


Urine Mucus  Slight /LPF   


 


Glucose (Fingerstick)


 


 


 223 mg/dL


(70-99) 166 mg/dL


(70-99)


 


Test


 4/13/20


00:02 4/13/20


06:09 4/13/20


06:10 





 


Glucose (Fingerstick)


 201 mg/dL


(70-99) 175 mg/dL


(70-99) 


 





 


White Blood Count


 


 


 5.3 x10^3/uL


(4.0-11.0) 





 


Red Blood Count


 


 


 2.48 x10^6/uL


(3.50-5.40) 





 


Hemoglobin


 


 


 8.0 g/dL


(12.0-15.5) 





 


Hematocrit


 


 


 24.3 %


(36.0-47.0) 





 


Mean Corpuscular Volume   98 fL ()  


 


Mean Corpuscular Hemoglobin   32 pg (25-35)  


 


Mean Corpuscular Hemoglobin


Concent 


 


 33 g/dL


(31-37) 





 


Red Cell Distribution Width


 


 


 19.3 %


(11.5-14.5) 





 


Platelet Count


 


 


 318 x10^3/uL


(140-400) 





 


Neutrophils (%) (Auto)   76 % (31-73)  


 


Lymphocytes (%) (Auto)   10 % (24-48)  


 


Monocytes (%) (Auto)   12 % (0-9)  


 


Eosinophils (%) (Auto)   2 % (0-3)  


 


Basophils (%) (Auto)   0 % (0-3)  


 


Neutrophils # (Auto)


 


 


 4.0 x10^3/uL


(1.8-7.7) 





 


Lymphocytes # (Auto)


 


 


 0.5 x10^3/uL


(1.0-4.8) 





 


Monocytes # (Auto)


 


 


 0.6 x10^3/uL


(0.0-1.1) 





 


Eosinophils # (Auto)


 


 


 0.1 x10^3/uL


(0.0-0.7) 





 


Basophils # (Auto)


 


 


 0.0 x10^3/uL


(0.0-0.2) 





 


Sodium Level


 


 


 135 mmol/L


(136-145) 





 


Potassium Level


 


 


 3.6 mmol/L


(3.5-5.1) 





 


Chloride Level


 


 


 98 mmol/L


() 





 


Carbon Dioxide Level


 


 


 23 mmol/L


(21-32) 





 


Anion Gap   14 (6-14)  


 


Blood Urea Nitrogen


 


 


 80 mg/dL


(7-20) 





 


Creatinine


 


 


 2.1 mg/dL


(0.6-1.0) 





 


Estimated GFR


(Cockcroft-Gault) 


 


 25.0 


 





 


Glucose Level


 


 


 167 mg/dL


(70-99) 





 


Calcium Level


 


 


 9.0 mg/dL


(8.5-10.1) 





 


Magnesium Level


 


 


 2.2 mg/dL


(1.8-2.4) 





 


Triglycerides Level


 


 


 245 mg/dL


(0-150) 











Laboratory Tests








Test


 4/12/20


12:00 4/12/20


12:42 4/12/20


17:53 4/13/20


00:02


 


Urine Collection Type Unknown    


 


Urine Color Brown    


 


Urine Clarity Cloudy    


 


Urine pH 5.0 (<5.0-8.0)    


 


Urine Specific Gravity


 >=1.030


(1.000-1.030) 


 


 





 


Urine Protein


 100 mg/dL


(NEG-TRACE) 


 


 





 


Urine Glucose (UA)


 100 mg/dL


(NEG) 


 


 





 


Urine Ketones (Stick)


 Trace mg/dL


(NEG) 


 


 





 


Urine Blood Large (NEG)    


 


Urine Nitrite Positive (NEG)    


 


Urine Bilirubin Small (NEG)    


 


Urine Urobilinogen Dipstick


 1.0 mg/dL (0.2


mg/dL) 


 


 





 


Urine Leukocyte Esterase Small (NEG)    


 


Urine RBC >40 /HPF (0-2)    


 


Urine WBC 1-4 /HPF (0-4)    


 


Urine Squamous Epithelial


Cells Occ /LPF 


 


 


 





 


Urine Transitional Epithelial


Cells Few /LPF 


 


 


 





 


Urine Amorphous Sediment Present /HPF    


 


Urine Bacteria


 Few /HPF


(0-FEW) 


 


 





 


Urine Hyaline Casts Few /HPF    


 


Urine Mucus Slight /LPF    


 


Glucose (Fingerstick)


 


 223 mg/dL


(70-99) 166 mg/dL


(70-99) 201 mg/dL


(70-99)


 


Test


 4/13/20


06:09 4/13/20


06:10 


 





 


Glucose (Fingerstick)


 175 mg/dL


(70-99) 


 


 





 


White Blood Count


 


 5.3 x10^3/uL


(4.0-11.0) 


 





 


Red Blood Count


 


 2.48 x10^6/uL


(3.50-5.40) 


 





 


Hemoglobin


 


 8.0 g/dL


(12.0-15.5) 


 





 


Hematocrit


 


 24.3 %


(36.0-47.0) 


 





 


Mean Corpuscular Volume  98 fL ()   


 


Mean Corpuscular Hemoglobin  32 pg (25-35)   


 


Mean Corpuscular Hemoglobin


Concent 


 33 g/dL


(31-37) 


 





 


Red Cell Distribution Width


 


 19.3 %


(11.5-14.5) 


 





 


Platelet Count


 


 318 x10^3/uL


(140-400) 


 





 


Neutrophils (%) (Auto)  76 % (31-73)   


 


Lymphocytes (%) (Auto)  10 % (24-48)   


 


Monocytes (%) (Auto)  12 % (0-9)   


 


Eosinophils (%) (Auto)  2 % (0-3)   


 


Basophils (%) (Auto)  0 % (0-3)   


 


Neutrophils # (Auto)


 


 4.0 x10^3/uL


(1.8-7.7) 


 





 


Lymphocytes # (Auto)


 


 0.5 x10^3/uL


(1.0-4.8) 


 





 


Monocytes # (Auto)


 


 0.6 x10^3/uL


(0.0-1.1) 


 





 


Eosinophils # (Auto)


 


 0.1 x10^3/uL


(0.0-0.7) 


 





 


Basophils # (Auto)


 


 0.0 x10^3/uL


(0.0-0.2) 


 





 


Sodium Level


 


 135 mmol/L


(136-145) 


 





 


Potassium Level


 


 3.6 mmol/L


(3.5-5.1) 


 





 


Chloride Level


 


 98 mmol/L


() 


 





 


Carbon Dioxide Level


 


 23 mmol/L


(21-32) 


 





 


Anion Gap  14 (6-14)   


 


Blood Urea Nitrogen


 


 80 mg/dL


(7-20) 


 





 


Creatinine


 


 2.1 mg/dL


(0.6-1.0) 


 





 


Estimated GFR


(Cockcroft-Gault) 


 25.0 


 


 





 


Glucose Level


 


 167 mg/dL


(70-99) 


 





 


Calcium Level


 


 9.0 mg/dL


(8.5-10.1) 


 





 


Magnesium Level


 


 2.2 mg/dL


(1.8-2.4) 


 





 


Triglycerides Level


 


 245 mg/dL


(0-150) 


 











Microbiology


4/10/20 Blood Culture - Preliminary, Resulted


          NO GROWTH AFTER 2 DAYS


4/4/20 Urine Culture - Final, Complete


         


4/4/20 Urine Culture Result 1 ([facility]) - Final, Complete


Medications





Current Medications


Sodium Chloride 1,000 ml @  1,000 mls/hr Q1H IV  Last administered on 3/16/20at 

03:00;  Start 3/16/20 at 03:00;  Stop 3/16/20 at 03:59;  Status DC


Ondansetron HCl (Zofran) 4 mg 1X  ONCE IVP  Last administered on 3/16/20at 

03:27;  Start 3/16/20 at 03:00;  Stop 3/16/20 at 03:01;  Status DC


Morphine Sulfate (Morphine Sulfate) 4 mg 1X  ONCE IV ;  Start 3/16/20 at 03:00; 

Stop 3/16/20 at 03:01;  Status Cancel


Ketorolac Tromethamine (Toradol 30mg Vial) 30 mg 1X  ONCE IV  Last administered 

on 3/16/20at 02:54;  Start 3/16/20 at 03:00;  Stop 3/16/20 at 03:01;  Status DC


Fentanyl Citrate (Fentanyl 2ml Vial) 25 mcg 1X  ONCE IVP  Last administered on 

3/16/20at 03:23;  Start 3/16/20 at 03:30;  Stop 3/16/20 at 03:31;  Status DC


Fentanyl Citrate (Fentanyl 2ml Vial) 100 mcg STK-MED ONCE .ROUTE ;  Start 

3/16/20 at 03:18;  Stop 3/16/20 at 03:18;  Status DC


Iohexol (Omnipaque 350 Mg/ml) 90 ml 1X  ONCE IV  Last administered on 3/16/20at 

03:25;  Start 3/16/20 at 03:30;  Stop 3/16/20 at 03:31;  Status DC


Info (CONTRAST GIVEN -- Rx MONITORING) 1 each PRN DAILY  PRN MC SEE COMMENTS;  

Start 3/16/20 at 03:30;  Stop 3/18/20 at 03:29;  Status DC


Hydromorphone HCl (Dilaudid) 0.5 mg 1X  ONCE IV  Last administered on 3/16/20at 

03:55;  Start 3/16/20 at 04:30;  Stop 3/16/20 at 04:32;  Status DC


Ondansetron HCl (Zofran) 4 mg PRN Q8HRS  PRN IV NAUSEA/VOMITING 1ST CHOICE;  

Start 3/16/20 at 05:00;  Stop 3/16/20 at 09:27;  Status DC


Morphine Sulfate (Morphine Sulfate) 2 mg PRN Q2HR  PRN IV SEVERE PAIN 7-10 Last 

administered on 3/17/20at 12:26;  Start 3/16/20 at 05:00;  Stop 3/17/20 at 

14:15;  Status DC


Sodium Chloride 1,000 ml @  125 mls/hr Q8H IV  Last administered on 3/16/20at 

20:56;  Start 3/16/20 at 05:00;  Stop 3/17/20 at 04:59;  Status DC


Hydromorphone HCl (Dilaudid) 0.5 mg PRN Q3HRS  PRN IV SEVERE PAIN 7-10 Last 

administered on 3/17/20at 10:06;  Start 3/16/20 at 05:00;  Stop 3/17/20 at 

12:01;  Status DC


Piperacillin Sod/ Tazobactam Sod 4.5 gm/Sodium Chloride 100 ml @  200 mls/hr 1X 

ONCE IV  Last administered on 3/16/20at 05:44;  Start 3/16/20 at 06:00;  Stop 

3/16/20 at 06:29;  Status DC


Ondansetron HCl (Zofran) 4 mg PRN Q4HRS  PRN IV NAUSEA/VOMITING 1ST CHOICE Last 

administered on 4/12/20at 09:56;  Start 3/16/20 at 09:30


Insulin Human Lispro (HumaLOG) 0-9 UNITS Q6HRS SQ  Last administered on 

4/13/20at 06:12;  Start 3/16/20 at 09:30


Dextrose (Dextrose 50%-Water Syringe) 12.5 gm PRN Q15MIN  PRN IV SEE COMMENTS;  

Start 3/16/20 at 09:30


Pantoprazole Sodium (PROTONIX VIAL for IV PUSH) 40 mg DAILYAC IVP  Last 

administered on 4/12/20at 09:55;  Start 3/16/20 at 11:30


Prochlorperazine Edisylate (Compazine) 10 mg PRN Q6HRS  PRN IV NAUSEA/VOMITING, 

2nd CHOICE Last administered on 3/17/20at 00:42;  Start 3/16/20 at 17:45


Atenolol (Tenormin) 100 mg DAILY PO ;  Start 3/17/20 at 09:00;  Stop 3/16/20 at 

20:08;  Status DC


Metoprolol Tartrate (Lopressor Vial) 2.5 mg Q6HRS IVP  Last administered on 

3/17/20at 05:51;  Start 3/16/20 at 20:15;  Stop 3/17/20 at 10:02;  Status DC


Metoprolol Tartrate (Lopressor Vial) 5 mg Q6HRS IVP  Last administered on 

3/26/20at 00:12;  Start 3/17/20 at 10:15;  Stop 3/28/20 at 08:48;  Status DC


Hydromorphone HCl (Dilaudid) 1 mg PRN Q3HRS  PRN IV SEVERE PAIN 7-10 Last 

administered on 3/23/20at 05:13;  Start 3/17/20 at 12:00;  Stop 3/31/20 at 

00:25;  Status DC


Lidocaine HCl (Buffered Lidocaine 1%) 3 ml STK-MED ONCE .ROUTE ;  Start 3/17/20 

at 12:55;  Stop 3/17/20 at 12:56;  Status DC


Albumin Human 500 ml @  125 mls/hr 1X  ONCE IV  Last administered on 3/17/20at 

14:33;  Start 3/17/20 at 14:30;  Stop 3/17/20 at 18:32;  Status DC


Norepinephrine Bitartrate 8 mg/ Dextrose 258 ml @  17.299 mls/ hr CONT  PRN IV 

PER PROTOCOL Last administered on 4/10/20at 13:31;  Start 3/17/20 at 15:30


Sodium Chloride 1,000 ml @  125 mls/hr Q8H IV  Last administered on 3/17/20at 

21:04;  Start 3/17/20 at 16:00;  Stop 3/18/20 at 02:42;  Status DC


Albumin Human 500 ml @  125 mls/hr PRN BID  PRN IV After every 2L NSS & BP < 

90mm Last administered on 4/1/20at 14:21;  Start 3/17/20 at 16:00


Iohexol (Omnipaque 300 Mg/ml) 60 ml 1X  ONCE IV  Last administered on 3/17/20at 

17:20;  Start 3/17/20 at 17:00;  Stop 3/17/20 at 17:01;  Status DC


Info (CONTRAST GIVEN -- Rx MONITORING) 1 each PRN DAILY  PRN MC SEE COMMENTS;  

Start 3/17/20 at 17:00;  Stop 3/19/20 at 16:59;  Status DC


Meropenem 1 gm/ Sodium Chloride 100 ml @  200 mls/hr Q8HRS IV  Last administered

on 3/18/20at 05:45;  Start 3/17/20 at 20:00;  Stop 3/18/20 at 08:48;  Status DC


Furosemide (Lasix) 40 mg 1X  ONCE IVP  Last administered on 3/17/20at 22:12;  

Start 3/17/20 at 22:30;  Stop 3/17/20 at 22:31;  Status DC


Calcium Chloride 1000 mg/Sodium Chloride 110 ml @  220 mls/hr 1X  ONCE IV  Last 

administered on 3/17/20at 22:11;  Start 3/17/20 at 22:30;  Stop 3/17/20 at 

22:59;  Status DC


Albuterol Sulfate (Ventolin Neb Soln) 2.5 mg 1X  ONCE NEB  Last administered on 

3/18/20at 00:56;  Start 3/17/20 at 22:30;  Stop 3/17/20 at 22:31;  Status DC


Insulin Human Regular (HumuLIN R VIAL) 5 unit 1X  ONCE IV  Last administered on 

3/17/20at 22:14;  Start 3/17/20 at 22:30;  Stop 3/17/20 at 22:31;  Status DC


Magnesium Sulfate 50 ml @ 25 mls/hr 1X  ONCE IV  Last administered on 3/18/20at 

02:57;  Start 3/18/20 at 03:00;  Stop 3/18/20 at 04:59;  Status DC


Calcium Gluconate 1000 mg/Sodium Chloride 110 ml @  220 mls/hr 1X  ONCE IV  Last

administered on 3/18/20at 02:46;  Start 3/18/20 at 03:00;  Stop 3/18/20 at 

03:29;  Status DC


Sodium Chloride 1,000 ml @  200 mls/hr Q5H IV  Last administered on 3/18/20at 

02:46;  Start 3/18/20 at 03:00;  Stop 3/18/20 at 10:21;  Status DC


Calcium Gluconate 1000 mg/Sodium Chloride 110 ml @  220 mls/hr 1X  ONCE IV  Last

administered on 3/18/20at 03:21;  Start 3/18/20 at 03:30;  Stop 3/18/20 at 

03:59;  Status DC


Sodium Bicarbonate 50 meq/Sodium Chloride 1,050 ml @  75 mls/hr Q14H IV  Last 

administered on 3/22/20at 21:10;  Start 3/18/20 at 07:30;  Stop 3/23/20 at 

10:28;  Status DC


Calcium Gluconate 2000 mg/Sodium Chloride 120 ml @  220 mls/hr 1X  ONCE IV  Last

administered on 3/18/20at 09:05;  Start 3/18/20 at 07:30;  Stop 3/18/20 at 

08:02;  Status DC


Lidocaine HCl (Xylocaine-Mpf 1% 2ml Vial) 2 ml STK-MED ONCE .ROUTE ;  Start 

3/18/20 at 08:47;  Stop 3/18/20 at 08:47;  Status DC


Meropenem 500 mg/ Sodium Chloride 50 ml @  100 mls/hr Q12HR IV  Last ad

ministered on 3/23/20at 21:01;  Start 3/18/20 at 18:00;  Stop 3/24/20 at 07:58; 

Status DC


Lidocaine HCl (Buffered Lidocaine 1%) 3 ml STK-MED ONCE .ROUTE ;  Start 3/18/20 

at 09:46;  Stop 3/18/20 at 09:46;  Status DC


Lidocaine HCl (Buffered Lidocaine 1%) 6 ml 1X  ONCE INJ  Last administered on 

3/18/20at 10:26;  Start 3/18/20 at 10:15;  Stop 3/18/20 at 10:16;  Status DC


Info (Tpn Per Pharmacy) 1 each PRN DAILY  PRN MC SEE COMMENTS Last administered 

on 4/12/20at 11:14;  Start 3/18/20 at 12:00


Sodium Chloride 1,000 ml @  1,000 mls/hr Q1H PRN IV hypotension;  Start 3/18/20 

at 12:07;  Stop 3/18/20 at 18:06;  Status DC


Diphenhydramine HCl (Benadryl) 25 mg 1X PRN  PRN IV ITCHING;  Start 3/18/20 at 

12:15;  Stop 3/19/20 at 12:14;  Status DC


Diphenhydramine HCl (Benadryl) 25 mg 1X PRN  PRN IV ITCHING;  Start 3/18/20 at 

12:15;  Stop 3/19/20 at 12:14;  Status DC


Sodium Chloride 1,000 ml @  400 mls/hr Q2H30M PRN IV PATENCY;  Start 3/18/20 at 

12:07;  Stop 3/19/20 at 00:06;  Status DC


Info (PHARMACY MONITORING -- do not chart) 1 each PRN DAILY  PRN MC SEE 

COMMENTS;  Start 3/18/20 at 12:15;  Stop 3/20/20 at 08:13;  Status DC


Sodium Chloride 90 meq/Calcium Gluconate 10 meq/ Multivitamins 10 ml/Chromium/ 

Copper/Manganese/ Seleni/Zn 1 ml/ Total Parenteral Nutrition/Amino Acids/De

xtrose/ Fat Emulsion Intravenous 55.005 ml  @ 2.292 mls/hr TPN  CONT IV ;  Start

3/18/20 at 22:00;  Stop 3/18/20 at 12:33;  Status DC


Info (Tpn Per Pharmacy) 1 each PRN DAILY  PRN MC SEE COMMENTS;  Start 3/18/20 at

12:30;  Status UNV


Sodium Chloride 90 meq/Calcium Gluconate 10 meq/ Multivitamins 10 ml/Chromium/ 

Copper/Manganese/ Seleni/Zn 0.5 ml/ Total Parenteral Nutrition/Amino Ac

ids/Dextrose/ Fat Emulsion Intravenous 1,512 ml @  63 mls/hr TPN  CONT IV  Last 

administered on 3/18/20at 22:06;  Start 3/18/20 at 22:00;  Stop 3/19/20 at 

21:59;  Status DC


Calcium Carbonate/ Glycine (Tums) 500 mg PRN AFTMEALHC  PRN PO INDIGESTION;  

Start 3/18/20 at 17:45


Calcium Gluconate (Calcium Gluconate) 2,000 mg 1X  ONCE IVP  Last administered 

on 3/19/20at 02:19;  Start 3/19/20 at 02:15;  Stop 3/19/20 at 02:16;  Status DC


Calcium Chloride 3000 mg/Sodium Chloride 1,030 ml @  50 mls/hr [id number] IV  Last 

administered on 3/21/20at 02:17;  Start 3/19/20 at 08:00;  Stop 3/21/20 at 

15:23;  Status DC


Lorazepam (Ativan Inj) 1 mg PRN Q4HRS  PRN IVP ANXIETY / AGITATION Last 

administered on 3/23/20at 00:34;  Start 3/19/20 at 09:00


Sodium Chloride 1,000 ml @  1,000 mls/hr Q1H PRN IV hypotension;  Start 3/19/20 

at 08:56;  Stop 3/19/20 at 14:55;  Status DC


Albumin Human 200 ml @  200 mls/hr 1X PRN  PRN IV Hypotension;  Start 3/19/20 at

09:00;  Stop 3/19/20 at 14:59;  Status DC


Diphenhydramine HCl (Benadryl) 25 mg 1X PRN  PRN IV ITCHING;  Start 3/19/20 at 

09:00;  Stop 3/20/20 at 08:59;  Status DC


Diphenhydramine HCl (Benadryl) 25 mg 1X PRN  PRN IV ITCHING;  Start 3/19/20 at 

09:00;  Stop 3/20/20 at 08:59;  Status DC


Sodium Chloride 1,000 ml @  400 mls/hr Q2H30M PRN IV PATENCY;  Start 3/19/20 at 

08:56;  Stop 3/19/20 at 20:55;  Status DC


Info (PHARMACY MONITORING -- do not chart) 1 each PRN DAILY  PRN MC SEE 

COMMENTS;  Start 3/19/20 at 09:00;  Status UNV


Info (PHARMACY MONITORING -- do not chart) 1 each PRN DAILY  PRN MC SEE 

COMMENTS;  Start 3/19/20 at 09:00;  Stop 3/20/20 at 08:13;  Status DC


Digoxin (Lanoxin) 500 mcg 1X  ONCE IV  Last administered on 3/19/20at 10:04;  

Start 3/19/20 at 10:00;  Stop 3/19/20 at 10:01;  Status DC


Digoxin (Lanoxin) 125 mcg 1X  ONCE IV  Last administered on 3/19/20at 17:10;  

Start 3/19/20 at 18:00;  Stop 3/19/20 at 18:01;  Status DC


Magnesium Sulfate 100 ml @  25 mls/hr 1X  ONCE IV  Last administered on 

3/19/20at 12:48;  Start 3/19/20 at 13:00;  Stop 3/19/20 at 16:59;  Status DC


Sodium Chloride 90 meq/Magnesium Sulfate 10 meq/ Calcium Gluconate 20 meq/ 

Multivitamins 10 ml/Chromium/ Copper/Manganese/ Seleni/Zn 0.5 ml/ Total 

Parenteral Nutrition/Amino Acids/Dextrose/ Fat Emulsion Intravenous 1,512 ml @  

63 mls/hr TPN  CONT IV  Last administered on 3/19/20at 22:25;  Start 3/19/20 at 

22:00;  Stop 3/20/20 at 21:59;  Status DC


Sodium Chloride 1,000 ml @  1,000 mls/hr Q1H PRN IV hypotension;  Start 3/20/20 

at 08:05;  Stop 3/20/20 at 14:04;  Status DC


Albumin Human 200 ml @  200 mls/hr 1X  ONCE IV  Last administered on 3/20/20at 

08:57;  Start 3/20/20 at 08:15;  Stop 3/20/20 at 09:14;  Status DC


Diphenhydramine HCl (Benadryl) 25 mg 1X PRN  PRN IV ITCHING;  Start 3/20/20 at 

08:15;  Stop 3/21/20 at 08:14;  Status DC


Diphenhydramine HCl (Benadryl) 25 mg 1X PRN  PRN IV ITCHING;  Start 3/20/20 at 

08:15;  Stop 3/21/20 at 08:14;  Status DC


Sodium Chloride 1,000 ml @  400 mls/hr Q2H30M PRN IV PATENCY;  Start 3/20/20 at 

08:05;  Stop 3/20/20 at 20:04;  Status DC


Info (PHARMACY MONITORING -- do not chart) 1 each PRN DAILY  PRN MC SEE 

COMMENTS;  Start 3/20/20 at 08:15;  Stop 3/24/20 at 07:57;  Status DC


Sodium Chloride 90 meq/Potassium Chloride 15 meq/ Potassium Phosphate 10 mmol/ 

Magnesium Sulfate 10 meq/Calcium Gluconate 20 meq/ Multivitamins 10 ml/Chromium/

Copper/Manganese/ Seleni/Zn 0.5 ml/ Total Parenteral Nutrition/Amino 

Acids/Dextrose/ Fat Emulsion Intravenous 1,512 ml @  63 mls/hr TPN  CONT IV  

Last administered on 3/20/20at 21:01;  Start 3/20/20 at 22:00;  Stop 3/21/20 at 

21:59;  Status DC


Potassium Chloride/Water 100 ml @  100 mls/hr 1X  ONCE IV  Last administered on 

3/20/20at 14:09;  Start 3/20/20 at 14:00;  Stop 3/20/20 at 14:59;  Status DC


Benzocaine (Hurricaine One) 1 spray 1X  ONCE MM  Last administered on 3/20/20at 

16:38;  Start 3/20/20 at 14:30;  Stop 3/20/20 at 14:31;  Status DC


Lidocaine HCl (Glydo (Lidocaine) Jelly) 1 thomas 1X  ONCE MM  Last administered on 

3/20/20at 16:38;  Start 3/20/20 at 14:30;  Stop 3/20/20 at 14:31;  Status DC


Linezolid/Dextrose 300 ml @  300 mls/hr Q12HR IV  Last administered on 3/26/20at

21:04;  Start 3/20/20 at 20:00;  Stop 3/27/20 at 07:50;  Status DC


Acetaminophen (Tylenol) 650 mg PRN Q6HRS  PRN PO MILD PAIN / TEMP;  Start 

3/21/20 at 03:30;  Stop 3/21/20 at 03:36;  Status DC


Acetaminophen (Tylenol) 650 mg PRN Q6HRS  PRN PEG MILD PAIN / TEMP Last 

administered on 3/26/20at 07:35;  Start 3/21/20 at 03:36


Sodium Chloride 1,000 ml @  1,000 mls/hr Q1H PRN IV hypotension;  Start 3/21/20 

at 07:50;  Stop 3/21/20 at 13:49;  Status DC


Albumin Human 200 ml @  200 mls/hr 1X PRN  PRN IV Hypotension;  Start 3/21/20 at

08:00;  Stop 3/21/20 at 13:59;  Status DC


Sodium Chloride (Normal Saline Flush) 10 ml 1X PRN  PRN IV AP catheter pack;  

Start 3/21/20 at 08:00;  Stop 3/22/20 at 07:59;  Status DC


Sodium Chloride (Normal Saline Flush) 10 ml 1X PRN  PRN IV  catheter pack;  

Start 3/21/20 at 08:00;  Stop 3/22/20 at 07:59;  Status DC


Sodium Chloride 1,000 ml @  400 mls/hr Q2H30M PRN IV PATENCY;  Start 3/21/20 at 

07:50;  Stop 3/21/20 at 19:49;  Status DC


Info (PHARMACY MONITORING -- do not chart) 1 each PRN DAILY  PRN MC SEE 

COMMENTS;  Start 3/21/20 at 08:00;  Status UNV


Info (PHARMACY MONITORING -- do not chart) 1 each PRN DAILY  PRN MC SEE 

COMMENTS;  Start 3/21/20 at 08:00;  Stop 3/23/20 at 08:25;  Status DC


Sodium Chloride 90 meq/Potassium Chloride 15 meq/ Potassium Phosphate 10 mmol/ 

Magnesium Sulfate 10 meq/Calcium Gluconate 20 meq/ Multivitamins 10 ml/Chromium/

Copper/Manganese/ Seleni/Zn 0.5 ml/ Total Parenteral Nutrition/Amino 

Acids/Dextrose/ Fat Emulsion Intravenous 1,512 ml @  63 mls/hr TPN  CONT IV  

Last administered on 3/21/20at 20:57;  Start 3/21/20 at 22:00;  Stop 3/22/20 at 

21:59;  Status DC


Sodium Chloride 90 meq/Potassium Chloride 15 meq/ Potassium Phosphate 15 mmol/ 

Magnesium Sulfate 10 meq/Calcium Gluconate 20 meq/ Multivitamins 10 ml/Chromium/

Copper/Manganese/ Seleni/Zn 0.5 ml/ Total Parenteral Nutrition/Amino 

Acids/Dextrose/ Fat Emulsion Intravenous 1,512 ml @  63 mls/hr TPN  CONT IV ;  

Start 3/22/20 at 22:00;  Stop 3/22/20 at 14:16;  Status DC


Sodium Chloride 90 meq/Potassium Chloride 15 meq/ Potassium Phosphate 15 mmol/ 

Magnesium Sulfate 10 meq/Calcium Gluconate 20 meq/ Multivitamins 10 ml/Chromium/

Copper/Manganese/ Seleni/Zn 0.5 ml/ Total Parenteral Nutrition/Amino 

Acids/Dextrose/ Fat Emulsion Intravenous 1,200 ml @  50 mls/hr TPN  CONT IV ;  

Start 3/22/20 at 22:00;  Stop 3/22/20 at 14:17;  Status DC


Sodium Chloride 90 meq/Potassium Chloride 15 meq/ Potassium Phosphate 10 mmol/ 

Magnesium Sulfate 10 meq/Calcium Gluconate 20 meq/ Multivitamins 10 ml/Chromium/

Copper/Manganese/ Seleni/Zn 0.5 ml/ Total Parenteral Nutrition/Amino 

Acids/Dextrose/ Fat Emulsion Intravenous 1,200 ml @  50 mls/hr TPN  CONT IV  

Last administered on 3/22/20at 23:29;  Start 3/22/20 at 22:00;  Stop 3/23/20 at 

21:59;  Status DC


Sodium Chloride 1,000 ml @  1,000 mls/hr Q1H PRN IV hypotension;  Start 3/23/20 

at 07:28;  Stop 3/23/20 at 13:27;  Status DC


Albumin Human 200 ml @  200 mls/hr 1X  ONCE IV  Last administered on 3/23/20at 

08:51;  Start 3/23/20 at 07:30;  Stop 3/23/20 at 08:29;  Status DC


Diphenhydramine HCl (Benadryl) 25 mg 1X PRN  PRN IV ITCHING;  Start 3/23/20 at 

07:30;  Stop 3/24/20 at 07:29;  Status DC


Diphenhydramine HCl (Benadryl) 25 mg 1X PRN  PRN IV ITCHING;  Start 3/23/20 at 

07:30;  Stop 3/24/20 at 07:29;  Status DC


Sodium Chloride 1,000 ml @  400 mls/hr Q2H30M PRN IV PATENCY;  Start 3/23/20 at 

07:28;  Stop 3/23/20 at 19:27;  Status DC


Info (PHARMACY MONITORING -- do not chart) 1 each PRN DAILY  PRN MC SEE 

COMMENTS;  Start 3/23/20 at 07:30;  Stop 4/3/20 at 13:01;  Status DC


Metronidazole 100 ml @  100 mls/hr Q6HRS IV  Last administered on 4/8/20at 

06:26;  Start 3/23/20 at 08:30;  Stop 4/8/20 at 09:58;  Status DC


Micafungin Sodium 100 mg/Dextrose 100 ml @  100 mls/hr Q24H IV  Last 

administered on 4/12/20at 10:26;  Start 3/23/20 at 09:00


Propofol 0 ml @ As Directed STK-MED ONCE IV ;  Start 3/23/20 at 07:53;  Stop 

3/23/20 at 07:53;  Status DC


Etomidate (Amidate) 20 mg STK-MED ONCE IV ;  Start 3/23/20 at 07:53;  Stop 

3/23/20 at 07:54;  Status DC


Midazolam HCl (Versed) 5 mg STK-MED ONCE .ROUTE ;  Start 3/23/20 at 07:57;  Stop

3/23/20 at 07:57;  Status DC


Fentanyl Citrate 30 ml @ 0 mls/hr CONT  PRN IV SEE PROTOCOL Last administered on

4/13/20at 07:09;  Start 3/23/20 at 08:15


Artificial Tears (Artificial Tears) 1 drop PRN Q1HR  PRN OU DRY EYE, 1st choice;

 Start 3/23/20 at 08:15


Midazolam HCl 50 mg/Sodium Chloride 50 ml @ 0 mls/hr CONT  PRN IV SEE PROTOCOL 

Last administered on 3/26/20at 22:39;  Start 3/23/20 at 08:15;  Stop 3/28/20 at 

15:59;  Status DC


Etomidate (Amidate) 8 mg 1X  ONCE IV  Last administered on 3/23/20at 08:33;  

Start 3/23/20 at 08:30;  Stop 3/23/20 at 08:31;  Status DC


Succinylcholine Chloride (Anectine) 120 mg 1X  ONCE IV  Last administered on 

3/23/20at 08:34;  Start 3/23/20 at 08:30;  Stop 3/23/20 at 08:31;  Status DC


Midazolam HCl (Versed) 5 mg 1X  ONCE IV ;  Start 3/23/20 at 08:30;  Stop 3/23/20

at 08:31;  Status DC


Potassium Chloride 15 meq/ Bicarbonate Dialysis Soln w/ out KCl 5,007.5 ml  @ 

1,000 mls/ hr Q5H1M IV  Last administered on 3/24/20at 11:11;  Start 3/23/20 at 

12:00;  Stop 3/24/20 at 11:15;  Status DC


Potassium Chloride 15 meq/ Bicarbonate Dialysis Soln w/ out KCl 5,007.5 ml  @ 

1,000 mls/ hr Q5H1M IV  Last administered on 3/24/20at 11:12;  Start 3/23/20 at 

12:00;  Stop 3/24/20 at 11:17;  Status DC


Potassium Chloride 15 meq/ Bicarbonate Dialysis Soln w/ out KCl 5,007.5 ml  @ 

1,000 mls/ hr Q5H1M IV  Last administered on 3/24/20at 11:11;  Start 3/23/20 at 

12:00;  Stop 3/24/20 at 11:19;  Status DC


Sodium Chloride 90 meq/Potassium Chloride 15 meq/ Potassium Phosphate 10 mmol/ 

Magnesium Sulfate 10 meq/Calcium Gluconate 20 meq/ Multivitamins 10 ml/Chromium/

Copper/Manganese/ Seleni/Zn 0.5 ml/ Total Parenteral Nutrition/Amino 

Acids/Dextrose/ Fat Emulsion Intravenous 1,400 ml @  58.333 mls/ hr TPN  CONT IV

 Last administered on 3/23/20at 21:42;  Start 3/23/20 at 22:00;  Stop 3/24/20 at

21:59;  Status DC


Heparin Sodium (Porcine) (Heparin Sodium) 5,000 unit Q8HRS SQ  Last administered

on 3/28/20at 05:55;  Start 3/23/20 at 15:00;  Stop 3/28/20 at 13:28;  Status DC


Meropenem 500 mg/ Sodium Chloride 50 ml @  100 mls/hr Q6HRS IV  Last 

administered on 3/25/20at 06:00;  Start 3/24/20 at 09:00;  Stop 3/25/20 at 

07:29;  Status DC


Potassium Phosphate 20 mmol/ Sodium Chloride 106.6667 ml @  51.667 m... 1X  ONCE

IV  Last administered on 3/24/20at 11:22;  Start 3/24/20 at 10:15;  Stop 3/24/20

at 12:18;  Status DC


Acetaminophen (Tylenol Supp) 650 mg PRN Q6HRS  PRN KS MILD PAIN / TEMP Last ad

ministered on 4/12/20at 17:13;  Start 3/24/20 at 10:30


Potassium Chloride/Water 100 ml @  100 mls/hr Q1H IV  Last administered on 

3/24/20at 12:12;  Start 3/24/20 at 11:00;  Stop 3/24/20 at 12:59;  Status DC


Potassium Chloride 20 meq/ Bicarbonate Dialysis Soln w/ out KCl 5,010 ml @  

1,000 mls/hr Q5H1M IV  Last administered on 3/25/20at 08:48;  Start 3/24/20 at 

12:00;  Stop 3/25/20 at 13:03;  Status DC


Potassium Chloride 20 meq/ Bicarbonate Dialysis Soln w/ out KCl 5,010 ml @  

1,000 mls/hr Q5H1M IV  Last administered on 3/29/20at 14:52;  Start 3/24/20 at 

11:30;  Stop 3/29/20 at 19:59;  Status DC


Potassium Chloride 20 meq/ Bicarbonate Dialysis Soln w/ out KCl 5,010 ml @  

1,000 mls/hr Q5H1M IV  Last administered on 3/29/20at 14:53;  Start 3/24/20 at 

11:30;  Stop 3/29/20 at 19:59;  Status DC


Sodium Chloride 90 meq/Potassium Chloride 15 meq/ Potassium Phosphate 15 mmol/ 

Magnesium Sulfate 10 meq/Calcium Gluconate 15 meq/ Multivitamins 10 ml/Chromium/

Copper/Manganese/ Seleni/Zn 0.5 ml/ Total Parenteral Nutrition/Amino 

Acids/Dextrose/ Fat Emulsion Intravenous 1,400 ml @  58.333 mls/ hr TPN  CONT IV

 Last administered on 3/24/20at 22:17;  Start 3/24/20 at 22:00;  Stop 3/25/20 at

21:59;  Status DC


Cefepime HCl (Maxipime) 2 gm Q12HR IVP  Last administered on 4/7/20at 20:56;  

Start 3/25/20 at 09:00;  Stop 4/8/20 at 09:58;  Status DC


Daptomycin 500 mg/ Sodium Chloride 50 ml @  100 mls/hr Q48H IV  Last 

administered on 4/10/20at 09:57;  Start 3/25/20 at 08:30;  Stop 4/10/20 at 

10:07;  Status DC


Lidocaine HCl (Buffered Lidocaine 1%) 3 ml 1X  ONCE INJ  Last administered on 

3/25/20at 10:27;  Start 3/25/20 at 10:30;  Stop 3/25/20 at 10:31;  Status DC


Potassium Phosphate 20 mmol/ Sodium Chloride 106.6667 ml @  51.667 m... 1X  ONCE

IV  Last administered on 3/25/20at 12:51;  Start 3/25/20 at 13:00;  Stop 3/25/20

at 15:03;  Status DC


Sodium Chloride 90 meq/Potassium Chloride 15 meq/ Potassium Phosphate 18 mmol/ 

Magnesium Sulfate 8 meq/Calcium Gluconate 15 meq/ Multivitamins 10 ml/Chromium/ 

Copper/Manganese/ Seleni/Zn 0.5 ml/ Total Parenteral Nutrition/Amino 

Acids/Dextrose/ Fat Emulsion Intravenous 1,400 ml @  58.333 mls/ hr TPN  CONT IV

 Last administered on 3/25/20at 22:16;  Start 3/25/20 at 22:00;  Stop 3/26/20 at

21:59;  Status DC


Potassium Chloride 20 meq/ Bicarbonate Dialysis Soln w/ out KCl 5,010 ml @  

1,000 mls/hr Q5H1M IV  Last administered on 3/29/20at 14:54;  Start 3/25/20 at 

16:00;  Stop 3/29/20 at 19:59;  Status DC


Multi-Ingred Cream/Lotion/Oil/ Oint (Artificial Tears Eye Ointment) 1 thomas PRN 

Q1HR  PRN OU DRY EYE, 2nd choice Last administered on 4/3/20at 11:05;  Start 

3/25/20 at 17:30


Sodium Chloride 90 meq/Potassium Chloride 15 meq/ Potassium Phosphate 18 mmol/ 

Magnesium Sulfate 8 meq/Calcium Gluconate 15 meq/ Multivitamins 10 ml/Chromium/ 

Copper/Manganese/ Seleni/Zn 0.5 ml/ Total Parenteral Nutrition/Amino 

Acids/Dextrose/ Fat Emulsion Intravenous 1,400 ml @  58.333 mls/ hr TPN  CONT IV

 Last administered on 3/26/20at 22:00;  Start 3/26/20 at 22:00;  Stop 3/27/20 at

21:59;  Status DC


Albumin Human 500 ml @  125 mls/hr 1X  ONCE IV ;  Start 3/26/20 at 14:15;  Stop 

3/26/20 at 18:14;  Status DC


Sodium Chloride 90 meq/Potassium Chloride 15 meq/ Potassium Phosphate 18 mmol/ 

Magnesium Sulfate 8 meq/Calcium Gluconate 15 meq/ Multivitamins 10 ml/Chromium/ 

Copper/Manganese/ Seleni/Zn 0.5 ml/ Insulin Human Regular 10 unit/ Total 

Parenteral Nutrition/Amino Acids/Dextrose/ Fat Emulsion Intravenous 1,400 ml @  

58.333 mls/ hr TPN  CONT IV  Last administered on 3/27/20at 21:43;  Start 

3/27/20 at 22:00;  Stop 3/28/20 at 21:59;  Status DC


Lidocaine HCl (Buffered Lidocaine 1%) 3 ml STIntroMaps-MED ONCE .ROUTE ;  Start 3/25/20 

at 10:00;  Stop 3/27/20 at 13:57;  Status DC


Midazolam HCl 100 mg/Sodium Chloride 100 ml @ 7 mls/hr CONT  PRN IV SEE PROTOCOL

Last administered on 4/8/20at 15:35;  Start 3/28/20 at 16:00


Sodium Chloride 90 meq/Potassium Chloride 15 meq/ Potassium Phosphate 18 mmol/ 

Magnesium Sulfate 8 meq/Calcium Gluconate 15 meq/ Multivitamins 10 ml/Chromium/ 

Copper/Manganese/ Seleni/Zn 0.5 ml/ Insulin Human Regular 15 unit/ Total 

Parenteral Nutrition/Amino Acids/Dextrose/ Fat Emulsion Intravenous 1,400 ml @  

58.333 mls/ hr TPN  CONT IV  Last administered on 3/28/20at 20:34;  Start 

3/28/20 at 22:00;  Stop 3/29/20 at 21:59;  Status DC


Info ([facility] Electrolyte Protocol) 1 ea CONT PRN  PRN MC PER PROTOCOL;  Start 

3/29/20 at 13:15


Sodium Chloride 90 meq/Potassium Chloride 15 meq/ Potassium Phosphate 18 mmol/ 

Magnesium Sulfate 8 meq/Calcium Gluconate 15 meq/ Multivitamins 10 ml/Chromium/ 

Copper/Manganese/ Seleni/Zn 0.5 ml/ Insulin Human Regular 15 unit/ Total Pa

renteral Nutrition/Amino Acids/Dextrose/ Fat Emulsion Intravenous 1,400 ml @  

58.333 mls/ hr TPN  CONT IV  Last administered on 3/29/20at 22:05;  Start 

3/29/20 at 22:00;  Stop 3/30/20 at 21:59;  Status DC


Potassium Chloride 15 meq/ Bicarbonate Dialysis Soln w/ out KCl 5,007.5 ml  @ 

1,000 mls/ hr Q5H1M IV  Last administered on 4/1/20at 18:14;  Start 3/29/20 at 

20:00;  Stop 4/2/20 at 13:08;  Status DC


Potassium Chloride 15 meq/ Bicarbonate Dialysis Soln w/ out KCl 5,007.5 ml  @ 

1,000 mls/ hr Q5H1M IV  Last administered on 4/1/20at 18:14;  Start 3/29/20 at 

20:00;  Stop 4/2/20 at 13:08;  Status DC


Potassium Chloride 15 meq/ Bicarbonate Dialysis Soln w/ out KCl 5,007.5 ml  @ 

1,000 mls/ hr Q5H1M IV  Last administered on 4/1/20at 18:14;  Start 3/29/20 at 

20:00;  Stop 4/2/20 at 13:08;  Status DC


Iohexol (Omnipaque 240 Mg/ml) 30 ml 1X  ONCE PO  Last administered on 3/30/20at 

11:30;  Start 3/30/20 at 11:30;  Stop 3/30/20 at 11:33;  Status DC


Info (CONTRAST GIVEN -- Rx MONITORING) 1 each PRN DAILY  PRN MC SEE COMMENTS;  

Start 3/30/20 at 11:45;  Stop 4/1/20 at 11:44;  Status DC


Sodium Chloride 90 meq/Potassium Chloride 15 meq/ Potassium Phosphate 18 mmol/ 

Magnesium Sulfate 8 meq/Calcium Gluconate 15 meq/ Multivitamins 10 ml/Chromium/ 

Copper/Manganese/ Seleni/Zn 0.5 ml/ Insulin Human Regular 15 unit/ Total 

Parenteral Nutrition/Amino Acids/Dextrose/ Fat Emulsion Intravenous 1,400 ml @  

58.333 mls/ hr TPN  CONT IV  Last administered on 3/30/20at 21:47;  Start 

3/30/20 at 22:00;  Stop 3/31/20 at 21:59;  Status DC


Sodium Chloride 90 meq/Potassium Chloride 15 meq/ Potassium Phosphate 18 mmol/ 

Magnesium Sulfate 8 meq/Calcium Gluconate 15 meq/ Multivitamins 10 ml/Chromium/ 

Copper/Manganese/ Seleni/Zn 0.5 ml/ Insulin Human Regular 20 unit/ Total 

Parenteral Nutrition/Amino Acids/Dextrose/ Fat Emulsion Intravenous 1,400 ml @  

58.333 mls/ hr TPN  CONT IV  Last administered on 3/31/20at 21:36;  Start 

3/31/20 at 22:00;  Stop 4/1/20 at 21:59;  Status DC


Alteplase, Recombinant (Cathflo For Central Catheter Clearance) 1 mg 1X  ONCE 

INT CAT  Last administered on 3/31/20at 20:03;  Start 3/31/20 at 19:30;  Stop 

3/31/20 at 19:46;  Status DC


Alteplase, Recombinant (Cathflo For Central Catheter Clearance) 1 mg 1X  ONCE 

INT CAT  Last administered on 3/31/20at 22:05;  Start 3/31/20 at 22:00;  Stop 

3/31/20 at 22:01;  Status DC


Sodium Chloride 90 meq/Potassium Chloride 15 meq/ Potassium Phosphate 18 mmol/ 

Magnesium Sulfate 8 meq/Calcium Gluconate 15 meq/ Multivitamins 10 ml/Chromium/ 

Copper/Manganese/ Seleni/Zn 0.5 ml/ Insulin Human Regular 20 unit/ Total 

Parenteral Nutrition/Amino Acids/Dextrose/ Fat Emulsion Intravenous 1,400 ml @  

58.333 mls/ hr TPN  CONT IV  Last administered on 4/1/20at 21:30;  Start 4/1/20 

at 22:00;  Stop 4/2/20 at 21:59;  Status DC


Dexmedetomidine HCl 400 mcg/ Sodium Chloride 100 ml @ 0 mls/hr CONT  PRN IV 

ANXIETY / AGITATION Last administered on 4/13/20at 06:04;  Start 4/2/20 at 08:15


Sodium Chloride 500 ml @  500 mls/hr 1X PRN  PRN IV ELEVATED BP, SEE COMMENTS;  

Start 4/2/20 at 08:15


Atropine Sulfate (ATROPINE 0.5mg SYRINGE) 0.5 mg PRN Q5MIN  PRN IV SEE COMMENTS;

 Start 4/2/20 at 08:15


Furosemide (Lasix) 20 mg 1X  ONCE IVP  Last administered on 4/2/20at 08:19;  

Start 4/2/20 at 08:15;  Stop 4/2/20 at 08:16;  Status DC


Lidocaine HCl (Buffered Lidocaine 1%) 3 ml STK-MED ONCE .ROUTE ;  Start 4/2/20 

at 08:39;  Stop 4/2/20 at 08:39;  Status DC


Lidocaine HCl (Buffered Lidocaine 1%) 6 ml 1X  ONCE INJ  Last administered on 

4/2/20at 09:05;  Start 4/2/20 at 09:00;  Stop 4/2/20 at 09:06;  Status DC


Sodium Chloride 90 meq/Potassium Chloride 15 meq/ Potassium Phosphate 18 mmol/ 

Magnesium Sulfate 8 meq/Calcium Gluconate 15 meq/ Multivitamins 10 ml/Chromium/ 

Copper/Manganese/ Seleni/Zn 0.5 ml/ Insulin Human Regular 20 unit/ Total 

Parenteral Nutrition/Amino Acids/Dextrose/ Fat Emulsion Intravenous 1,400 ml @  

58.333 mls/ hr TPN  CONT IV  Last administered on 4/2/20at 22:45;  Start 4/2/20 

at 22:00;  Stop 4/3/20 at 21:59;  Status DC


Sodium Chloride 1,000 ml @  1,000 mls/hr Q1H PRN IV hypotension;  Start 4/3/20 

at 07:30;  Stop 4/3/20 at 13:29;  Status DC


Albumin Human 200 ml @  200 mls/hr 1X PRN  PRN IV Hypotension Last administered 

on 4/3/20at 09:36;  Start 4/3/20 at 07:30;  Stop 4/3/20 at 13:29;  Status DC


Sodium Chloride (Normal Saline Flush) 10 ml 1X PRN  PRN IV AP catheter pack;  

Start 4/3/20 at 07:30;  Stop 4/3/20 at 21:29;  Status DC


Sodium Chloride (Normal Saline Flush) 10 ml 1X PRN  PRN IV  catheter pack;  

Start 4/3/20 at 07:30;  Stop 4/4/20 at 07:29;  Status DC


Sodium Chloride 1,000 ml @  400 mls/hr Q2H30M PRN IV PATENCY;  Start 4/3/20 at 

07:30;  Stop 4/3/20 at 19:29;  Status DC


Info (PHARMACY MONITORING -- do not chart) 1 each PRN DAILY  PRN MC SEE 

COMMENTS;  Start 4/3/20 at 07:30;  Stop 4/3/20 at 13:02;  Status DC


Info (PHARMACY MONITORING -- do not chart) 1 each PRN DAILY  PRN MC SEE COMMENTS

;  Start 4/3/20 at 07:30;  Stop 4/5/20 at 12:45;  Status DC


Sodium Chloride 90 meq/Potassium Chloride 15 meq/ Potassium Phosphate 10 mmol/ 

Magnesium Sulfate 8 meq/Calcium Gluconate 15 meq/ Multivitamins 10 ml/Chromium/ 

Copper/Manganese/ Seleni/Zn 0.5 ml/ Insulin Human Regular 25 unit/ Total 

Parenteral Nutrition/Amino Acids/Dextrose/ Fat Emulsion Intravenous 1,400 ml @  

58.333 mls/ hr TPN  CONT IV  Last administered on 4/3/20at 22:19;  Start 4/3/20 

at 22:00;  Stop 4/4/20 at 21:59;  Status DC


Heparin Sodium (Porcine) (Heparin Sodium) 5,000 unit Q12HR SQ  Last administered

on 4/12/20at 21:54;  Start 4/3/20 at 21:00


Ondansetron HCl (Zofran) 4 mg PRN Q6HRS  PRN IV NAUSEA/VOMITING;  Start 4/6/20 

at 07:00;  Stop 4/7/20 at 06:59;  Status DC


Fentanyl Citrate (Fentanyl 2ml Vial) 25 mcg PRN Q5MIN  PRN IV MILD PAIN 1-3;  

Start 4/6/20 at 07:00;  Stop 4/7/20 at 06:59;  Status DC


Fentanyl Citrate (Fentanyl 2ml Vial) 50 mcg PRN Q5MIN  PRN IV MODERATE TO SEVERE

PAIN;  Start 4/6/20 at 07:00;  Stop 4/7/20 at 06:59;  Status DC


Ringer's Solution 1,000 ml @  30 mls/hr Q24H IV ;  Start 4/6/20 at 07:00;  Stop 

4/6/20 at 18:59;  Status DC


Lidocaine HCl (Xylocaine-Mpf 1% 2ml Vial) 2 ml PRN 1X  PRN ID PRIOR TO IV START;

 Start 4/6/20 at 07:00;  Stop 4/7/20 at 06:59;  Status DC


Prochlorperazine Edisylate (Compazine) 5 mg PACU PRN  PRN IV NAUSEA, MRX1;  

Start 4/6/20 at 07:00;  Stop 4/7/20 at 06:59;  Status DC


Sodium Chloride 1,000 ml @  1,000 mls/hr Q1H PRN IV hypotension;  Start 4/4/20 

at 09:10;  Stop 4/4/20 at 15:09;  Status DC


Albumin Human 200 ml @  200 mls/hr 1X PRN  PRN IV Hypotension Last administered 

on 4/4/20at 10:10;  Start 4/4/20 at 09:15;  Stop 4/4/20 at 15:14;  Status DC


Sodium Chloride 1,000 ml @  400 mls/hr Q2H30M PRN IV PATENCY;  Start 4/4/20 at 

09:10;  Stop 4/4/20 at 21:09;  Status DC


Info (PHARMACY MONITORING -- do not chart) 1 each PRN DAILY  PRN MC SEE 

COMMENTS;  Start 4/4/20 at 09:15;  Stop 4/5/20 at 12:45;  Status DC


Info (PHARMACY MONITORING -- do not chart) 1 each PRN DAILY  PRN MC SEE 

COMMENTS;  Start 4/4/20 at 09:15;  Stop 4/5/20 at 12:45;  Status DC


Sodium Chloride 90 meq/Potassium Chloride 15 meq/ Potassium Phosphate 10 mmol/ 

Magnesium Sulfate 8 meq/Calcium Gluconate 15 meq/ Multivitamins 10 ml/Chromium/ 

Copper/Manganese/ Seleni/Zn 0.5 ml/ Insulin Human Regular 25 unit/ Total 

Parenteral Nutrition/Amino Acids/Dextrose/ Fat Emulsion Intravenous 1,400 ml @  

58.333 mls/ hr TPN  CONT IV  Last administered on 4/4/20at 22:10;  Start 4/4/20 

at 22:00;  Stop 4/5/20 at 21:59;  Status DC


Magnesium Sulfate 50 ml @ 25 mls/hr PRN DAILY  PRN IV for Mag < 1.7 on am labs; 

Start 4/5/20 at 09:15


Sodium Chloride 90 meq/Potassium Chloride 15 meq/ Potassium Phosphate 10 mmol/ 

Magnesium Sulfate 8 meq/Calcium Gluconate 15 meq/ Multivitamins 10 ml/Chromium/ 

Copper/Manganese/ Seleni/Zn 0.5 ml/ Insulin Human Regular 25 unit/ Total 

Parenteral Nutrition/Amino Acids/Dextrose/ Fat Emulsion Intravenous 1,400 ml @  

58.333 mls/ hr TPN  CONT IV  Last administered on 4/5/20at 21:20;  Start 4/5/20 

at 22:00;  Stop 4/6/20 at 21:59;  Status DC


Sodium Chloride 1,000 ml @  1,000 mls/hr Q1H PRN IV hypotension;  Start 4/5/20 

at 12:23;  Stop 4/5/20 at 18:22;  Status DC


Albumin Human 200 ml @  200 mls/hr 1X  ONCE IV  Last administered on 4/5/20at 

13:34;  Start 4/5/20 at 12:30;  Stop 4/5/20 at 13:29;  Status DC


Diphenhydramine HCl (Benadryl) 25 mg 1X PRN  PRN IV ITCHING;  Start 4/5/20 at 

12:30;  Stop 4/6/20 at 12:29;  Status DC


Diphenhydramine HCl (Benadryl) 25 mg 1X PRN  PRN IV ITCHING;  Start 4/5/20 at 

12:30;  Stop 4/6/20 at 12:29;  Status DC


Info (PHARMACY MONITORING -- do not chart) 1 each PRN DAILY  PRN MC SEE 

COMMENTS;  Start 4/5/20 at 12:30;  Status Cancel


Bupivacaine HCl/ Epinephrine Bitart (Sensorcain-Epi 0.5%-1:[id number] Mpf) 30 ml 

STK-MED ONCE .ROUTE  Last administered on 4/6/20at 11:44;  Start 4/6/20 at 11:00

;  Stop 4/6/20 at 11:01;  Status DC


Cellulose (Surgicel Fibrillar 1x2) 1 each STK-MED ONCE .ROUTE ;  Start 4/6/20 at

11:00;  Stop 4/6/20 at 11:01;  Status DC


Sodium Chloride 90 meq/Potassium Chloride 15 meq/ Potassium Phosphate 10 mmol/ 

Magnesium Sulfate 12 meq/Calcium Gluconate 15 meq/ Multivitamins 10 ml/Chromium/

Copper/Manganese/ Seleni/Zn 0.5 ml/ Insulin Human Regular 25 unit/ Total 

Parenteral Nutrition/Amino Acids/Dextrose/ Fat Emulsion Intravenous 1,400 ml @  

58.333 mls/ hr TPN  CONT IV  Last administered on 4/6/20at 22:24;  Start 4/6/20 

at 22:00;  Stop 4/7/20 at 21:59;  Status DC


Propofol 20 ml @ As Directed STK-MED ONCE IV ;  Start 4/6/20 at 11:07;  Stop 

4/6/20 at 11:07;  Status DC


Cellulose (Surgicel Hemostat 4x8) 1 each STK-MED ONCE .ROUTE  Last administered 

on 4/6/20at 11:44;  Start 4/6/20 at 11:55;  Stop 4/6/20 at 11:56;  Status DC


Sevoflurane (Ultane) 60 ml STK-MED ONCE IH ;  Start 4/6/20 at 12:46;  Stop 

4/6/20 at 12:46;  Status DC


Sodium Chloride 1,000 ml @  1,000 mls/hr Q1H PRN IV hypotension;  Start 4/6/20 

at 13:51;  Stop 4/6/20 at 19:50;  Status DC


Albumin Human 200 ml @  200 mls/hr 1X PRN  PRN IV Hypotension Last administered 

on 4/6/20at 14:51;  Start 4/6/20 at 14:00;  Stop 4/6/20 at 19:59;  Status DC


Diphenhydramine HCl (Benadryl) 25 mg 1X PRN  PRN IV ITCHING;  Start 4/6/20 at 

14:00;  Stop 4/7/20 at 13:59;  Status DC


Diphenhydramine HCl (Benadryl) 25 mg 1X PRN  PRN IV ITCHING;  Start 4/6/20 at 

14:00;  Stop 4/7/20 at 13:59;  Status DC


Sodium Chloride 1,000 ml @  400 mls/hr Q2H30M PRN IV PATENCY;  Start 4/6/20 at 

13:51;  Stop 4/7/20 at 01:50;  Status DC


Info (PHARMACY MONITORING -- do not chart) 1 each PRN DAILY  PRN MC SEE 

COMMENTS;  Start 4/6/20 at 14:00;  Stop 4/9/20 at 08:16;  Status DC


Heparin Sodium (Porcine) (Hep Lock Adult) 500 unit STK-MED ONCE IVP ;  Start 

4/7/20 at 09:29;  Stop 4/7/20 at 09:30;  Status DC


Sodium Chloride 1,000 ml @  1,000 mls/hr Q1H PRN IV hypotension;  Start 4/7/20 

at 10:43;  Stop 4/7/20 at 16:42;  Status DC


Sodium Chloride 1,000 ml @  400 mls/hr Q2H30M PRN IV PATENCY;  Start 4/7/20 at 

10:43;  Stop 4/7/20 at 22:42;  Status DC


Info (PHARMACY MONITORING -- do not chart) 1 each PRN DAILY  PRN MC SEE 

COMMENTS;  Start 4/7/20 at 10:45;  Status UNV


Info (PHARMACY MONITORING -- do not chart) 1 each PRN DAILY  PRN MC SEE COMME

NTS;  Start 4/7/20 at 10:45;  Status UNV


Sodium Chloride 90 meq/Potassium Chloride 15 meq/ Magnesium Sulfate 12 

meq/Calcium Gluconate 15 meq/ Multivitamins 10 ml/Chromium/ Copper/Manganese/ 

Seleni/Zn 0.5 ml/ Insulin Human Regular 25 unit/ Total Parenteral 

Nutrition/Amino Acids/Dextrose/ Fat Emulsion Intravenous 1,400 ml @  58.333 mls/

hr TPN  CONT IV  Last administered on 4/7/20at 22:13;  Start 4/7/20 at 22:00;  

Stop 4/8/20 at 21:59;  Status DC


Sodium Chloride 1,000 ml @  1,000 mls/hr Q1H PRN IV hypotension;  Start 4/8/20 

at 07:50;  Stop 4/8/20 at 13:49;  Status DC


Albumin Human 200 ml @  200 mls/hr 1X  ONCE IV ;  Start 4/8/20 at 08:00;  Stop 

4/8/20 at 08:53;  Status DC


Diphenhydramine HCl (Benadryl) 25 mg 1X PRN  PRN IV ITCHING;  Start 4/8/20 at 

08:00;  Stop 4/9/20 at 07:59;  Status DC


Diphenhydramine HCl (Benadryl) 25 mg 1X PRN  PRN IV ITCHING;  Start 4/8/20 at 

08:00;  Stop 4/9/20 at 07:59;  Status DC


Info (PHARMACY MONITORING -- do not chart) 1 each PRN DAILY  PRN MC SEE 

COMMENTS;  Start 4/8/20 at 08:00;  Stop 4/9/20 at 08:16;  Status DC


Albumin Human 50 ml @ 50 mls/hr 1X  ONCE IV ;  Start 4/8/20 at 08:53;  Stop 

4/8/20 at 08:56;  Status DC


Albumin Human 200 ml @  50 mls/hr PRN 1X  PRN IV HYPOTENSION Last administered 

on 4/8/20at 09:09;  Start 4/8/20 at 09:00


Meropenem 500 mg/ Sodium Chloride 50 ml @  100 mls/hr Q12H IV  Last administered

on 4/12/20at 21:48;  Start 4/8/20 at 10:00


Sodium Chloride 90 meq/Magnesium Sulfate 12 meq/ Calcium Gluconate 15 meq/ 

Multivitamins 10 ml/Chromium/ Copper/Manganese/ Seleni/Zn 0.5 ml/ Insulin Human 

Regular 25 unit/ Total Parenteral Nutrition/Amino Acids/Dextrose/ Fat Emulsion 

Intravenous 1,400 ml @  58.333 mls/ hr TPN  CONT IV  Last administered on 

4/8/20at 21:41;  Start 4/8/20 at 22:00;  Stop 4/9/20 at 21:59;  Status DC


Sodium Chloride 1,000 ml @  1,000 mls/hr Q1H PRN IV hypotension;  Start 4/9/20 

at 07:58;  Stop 4/9/20 at 13:57;  Status DC


Albumin Human 200 ml @  200 mls/hr 1X PRN  PRN IV Hypotension Last administered 

on 4/9/20at 09:30;  Start 4/9/20 at 08:00;  Stop 4/9/20 at 13:59;  Status DC


Sodium Chloride 1,000 ml @  400 mls/hr Q2H30M PRN IV PATENCY;  Start 4/9/20 at 

07:58;  Stop 4/9/20 at 19:57;  Status DC


Info (PHARMACY MONITORING -- do not chart) 1 each PRN DAILY  PRN MC SEE 

COMMENTS;  Start 4/9/20 at 08:00


Info (PHARMACY MONITORING -- do not chart) 1 each PRN DAILY  PRN MC SEE 

COMMENTS;  Start 4/9/20 at 08:15;  Status UNV


Sodium Chloride 90 meq/Potassium Phosphate 5 mmol/ Magnesium Sulfate 12 

meq/Calcium Gluconate 15 meq/ Multivitamins 10 ml/Chromium/ Copper/Manganese/ 

Seleni/Zn 0.5 ml/ Insulin Human Regular 30 unit/ Total Parenteral 

Nutrition/Amino Acids/Dextrose/ Fat Emulsion Intravenous 1,400 ml @  58.333 mls/

hr TPN  CONT IV  Last administered on 4/9/20at 22:08;  Start 4/9/20 at 22:00;  

Stop 4/10/20 at 21:59;  Status DC


Linezolid/Dextrose 300 ml @  300 mls/hr Q12HR IV  Last administered on 4/12/20at

21:48;  Start 4/10/20 at 11:00


Sodium Chloride 90 meq/Potassium Phosphate 15 mmol/ Magnesium Sulfate 12 

meq/Calcium Gluconate 15 meq/ Multivitamins 10 ml/Chromium/ Copper/Manganese/ 

Seleni/Zn 0.5 ml/ Insulin Human Regular 30 unit/ Total Parenteral 

Nutrition/Amino Acids/Dextrose/ Fat Emulsion Intravenous 1,400 ml @  58.333 mls/

hr TPN  CONT IV  Last administered on 4/10/20at 21:49;  Start 4/10/20 at 22:00; 

Stop 4/11/20 at 21:59;  Status DC


Sodium Chloride 90 meq/Potassium Phosphate 15 mmol/ Magnesium Sulfate 12 

meq/Calcium Gluconate 15 meq/ Multivitamins 10 ml/Chromium/ Copper/Manganese/ 

Seleni/Zn 0.5 ml/ Insulin Human Regular 40 unit/ Total Parenteral 

Nutrition/Amino Acids/Dextrose/ Fat Emulsion Intravenous 1,400 ml @  58.333 mls/

hr TPN  CONT IV  Last administered on 4/11/20at 21:21;  Start 4/11/20 at 22:00; 

Stop 4/12/20 at 21:59;  Status DC


Sodium Chloride 1,000 ml @  1,000 mls/hr Q1H PRN IV hypotension;  Start 4/11/20 

at 13:26;  Stop 4/11/20 at 19:25;  Status DC


Albumin Human 200 ml @  200 mls/hr 1X PRN  PRN IV Hypotension Last administered 

on 4/11/20at 15:00;  Start 4/11/20 at 13:30;  Stop 4/11/20 at 19:29;  Status DC


Sodium Chloride (Normal Saline Flush) 10 ml 1X PRN  PRN IV AP catheter pack;  

Start 4/11/20 at 13:30;  Stop 4/12/20 at 13:29;  Status DC


Sodium Chloride (Normal Saline Flush) 10 ml 1X PRN  PRN IV  catheter pack;  

Start 4/11/20 at 13:30;  Stop 4/12/20 at 13:29;  Status DC


Sodium Chloride 1,000 ml @  400 mls/hr Q2H30M PRN IV PATENCY;  Start 4/11/20 at 

13:26;  Stop 4/12/20 at 01:25;  Status DC


Info (PHARMACY MONITORING -- do not chart) 1 each PRN DAILY  PRN MC SEE 

COMMENTS;  Start 4/11/20 at 13:30;  Stop 4/11/20 at 13:33;  Status DC


Info (PHARMACY MONITORING -- do not chart) 1 each PRN DAILY  PRN MC SEE 

COMMENTS;  Start 4/11/20 at 13:30;  Stop 4/11/20 at 13:34;  Status DC


Sodium Chloride 90 meq/Potassium Phosphate 19 mmol/ Magnesium Sulfate 12 

meq/Calcium Gluconate 15 meq/ Multivitamins 10 ml/Chromium/ Copper/Manganese/ 

Seleni/Zn 0.5 ml/ Insulin Human Regular 40 unit/ Total Parenteral 

Nutrition/Amino Acids/Dextrose/ Fat Emulsion Intravenous 1,400 ml @  58.333 mls/

hr TPN  CONT IV  Last administered on 4/12/20at 21:54;  Start 4/12/20 at 22:00; 

Stop 4/13/20 at 21:59





Active Scripts


Active


Reported


Bisoprolol Fumarate 5 Mg Tablet 10 Mg PO DAILY


Vitals/I & O





Vital Sign - Last 24 Hours








 4/12/20 4/12/20 4/12/20 4/12/20





 08:32 09:00 10:00 11:00


 


Temp   99.1 





   99.1 


 


Pulse  112 112 104


 


Resp  22 32 25


 


B/P (MAP)  108/56 (73) 121/67 (85) 108/57 (74)


 


Pulse Ox 99 99 100 98


 


O2 Delivery Ventilator Ventilator Ventilator Ventilator


 


    





    





 4/12/20 4/12/20 4/12/20 4/12/20





 11:48 12:00 12:00 13:00


 


Temp  98.8  





  98.8  


 


Pulse  106  117


 


Resp  21  22


 


B/P (MAP)  116/64 (81)  118/68 (85)


 


Pulse Ox 98 98  100


 


O2 Delivery Ventilator Ventilator Mechanical Ventilator Ventilator


 


    





    





 4/12/20 4/12/20 4/12/20 4/12/20





 13:42 14:00 15:00 15:27


 


Pulse  112 98 


 


Resp  22 22 


 


B/P (MAP)  132/78 (96) 108/61 (77) 


 


Pulse Ox 99 100 100 98


 


O2 Delivery Ventilator Ventilator Ventilator Ventilator





 4/12/20 4/12/20 4/12/20 4/12/20





 16:00 16:00 17:00 17:02


 


Temp 100.0   





 100.0   


 


Pulse 120  126 


 


Resp 22  22 23


 


B/P (MAP) 113/74 (87)  131/69 (89) 


 


Pulse Ox 100  100 100


 


O2 Delivery Ventilator Mechanical Ventilator Ventilator Ventilator


 


    





    





 4/12/20 4/12/20 4/12/20 4/12/20





 17:32 18:00 18:03 19:00


 


Pulse  100  112


 


Resp 21 22  18


 


B/P (MAP)  133/80 (97)  90/55 (67)


 


Pulse Ox 100 100 100 99


 


O2 Delivery Ventilator Ventilator Ventilator Ventilator





 4/12/20 4/12/20 4/12/20 4/12/20





 20:00 20:00 20:00 21:00


 


Temp 99.5   





 99.5   


 


Pulse 113   120


 


Resp 18   18


 


B/P (MAP) 129/81 (97)   142/73 (96)


 


Pulse Ox 99  100 98


 


O2 Delivery Ventilator Mechanical Ventilator  Ventilator


 


    





    





 4/12/20 4/12/20 4/12/20 4/13/20





 22:00 23:00 23:45 00:00


 


Temp    99.4





    99.4


 


Pulse 130 98  128


 


Resp 18 18  18


 


B/P (MAP) 128/62 (84) 97/50 (66)  154/97 (116)


 


Pulse Ox 99 98 100 100


 


O2 Delivery Ventilator Ventilator Ventilator Ventilator


 


    





    





 4/13/20 4/13/20 4/13/20 4/13/20





 00:00 00:14 00:51 01:00


 


Pulse    115


 


Resp  18 18 18


 


B/P (MAP)    140/66 (90)


 


Pulse Ox  100 100 100


 


O2 Delivery Mechanical Ventilator Ventilator Ventilator Ventilator





 4/13/20 4/13/20 4/13/20 4/13/20





 02:00 03:00 04:00 04:00


 


Temp   98.8 





   98.8 


 


Pulse 124 88 125 


 


Resp 18 18 18 


 


B/P (MAP) 153/87 (109) 96/51 (66) 106/55 (72) 


 


Pulse Ox 98 98 100 


 


O2 Delivery Ventilator Ventilator Ventilator Mechanical Ventilator


 


    





    





 4/13/20 4/13/20 4/13/20 4/13/20





 04:30 05:00 06:00 07:00


 


Temp    98.3





    98.3


 


Pulse  110 86 94


 


Resp  18 18 18


 


B/P (MAP)  122/64 (83) 113/71 (85) 93/53 (66)


 


Pulse Ox 100 99 99 99


 


O2 Delivery Ventilator Ventilator Ventilator Ventilator


 


    





    





 4/13/20 4/13/20  





 07:09 07:54  


 


Resp 18   


 


Pulse Ox 99   


 


O2 Delivery Ventilator Mechanical Ventilator  














Intake and Output   


 


 4/12/20 4/12/20 4/13/20





 15:00 23:00 07:00


 


Intake Total  1296 ml 1433.0 ml


 


Output Total 85 ml 620 ml 261 ml


 


Balance -85 ml 676 ml 1172.0 ml











Hemodynamically unstable?:  No


Is patient in severe pain?:  No


Is NPO status required?:  Yes











GAETANO GONCALVES MD        Apr 13, 2020 08:11

## 2020-04-13 NOTE — NUR
Pharmacy TPN Dosing Note



S: JESENIA RICH is a 49 year old F Currently receiving Central Continuous TPN started 
03/18/20



B:Pertinent PMH: 

Necrotizing pancreatitis

Height: 5 feet, 8 inches

Weight: 102.983309 kg



Current diet: NPO 



LABS:

Sodium:    135 

Potassium: 3.6 

Chloride:  98 

Calcium:   9.0 

Corrected Calcium: 9.88 

Magnesium: 2.1 

CO2:       23 

SCr:       2.1 

Glucose:   175 

Albumin:   2.9 

AST:       47 

ALT:       20 



TPN FORMULA:

TPN TYPE:  Central Continuous

AMINO ACIDS:         125 gm

DEXTROSE:            225 gm

LIPIDS:              20 gm

SODIUM CHLORIDE:     100 mEq

SODIUM ACETATE:      -- mEq

SODIUM PHOSPHATE:    - mmol

POTASSIUM CHLORIDE:  20 mEq

POTASSIUM ACETATE:   -- mEq

POTASSIUM PHOSPHATE: 19 mmol

MAGNESIUM:           12 mEq

CALCIUM:             15 mEq

INSULIN:             40 units

MULTIPLE VITAMIN:    10 ml

TRACE ELEMENTS:      0.5 ml(s)



TPN PLAN:  

Triglycerides decrease to 245. add kcl 20 meq/bag, up nacl to 100 meq.





R: Continue TPN AT SAME RATE

Will monitor electrolytes, glucose, and tolerance to TPN.



 YENIFER STREETER [facility], 04/13/20 1956

## 2020-04-13 NOTE — PDOC
Objective:


Objective:


D/w nurse - no change, still bilious OG output.  D/w dialysis nurse - ?a little 

awake, hypotensive.


Vital Signs:





                                   Vital Signs








  Date Time  Temp Pulse Resp B/P (MAP) Pulse Ox O2 Delivery O2 Flow Rate FiO2


 


4/13/20 09:00  109 25 131/78 (95) 98 Ventilator  


 


4/13/20 07:00 98.3       





 98.3       








Labs:





Laboratory Tests








Test


 4/12/20


12:42 4/12/20


17:53 4/13/20


00:02 4/13/20


06:09


 


Glucose (Fingerstick) 223 mg/dL  166 mg/dL  201 mg/dL  175 mg/dL 


 


Test


 4/13/20


06:10 


 


 





 


White Blood Count 5.3 x10^3/uL    


 


Red Blood Count 2.48 x10^6/uL    


 


Hemoglobin 8.0 g/dL    


 


Hematocrit 24.3 %    


 


Mean Corpuscular Volume 98 fL    


 


Mean Corpuscular Hemoglobin 32 pg    


 


Mean Corpuscular Hemoglobin


Concent 33 g/dL 


 


 


 





 


Red Cell Distribution Width 19.3 %    


 


Platelet Count 318 x10^3/uL    


 


Neutrophils (%) (Auto) 76 %    


 


Lymphocytes (%) (Auto) 10 %    


 


Monocytes (%) (Auto) 12 %    


 


Eosinophils (%) (Auto) 2 %    


 


Basophils (%) (Auto) 0 %    


 


Neutrophils # (Auto) 4.0 x10^3/uL    


 


Lymphocytes # (Auto) 0.5 x10^3/uL    


 


Monocytes # (Auto) 0.6 x10^3/uL    


 


Eosinophils # (Auto) 0.1 x10^3/uL    


 


Basophils # (Auto) 0.0 x10^3/uL    


 


Sodium Level 135 mmol/L    


 


Potassium Level 3.6 mmol/L    


 


Chloride Level 98 mmol/L    


 


Carbon Dioxide Level 23 mmol/L    


 


Anion Gap 14    


 


Blood Urea Nitrogen 80 mg/dL    


 


Creatinine 2.1 mg/dL    


 


Estimated GFR


(Cockcroft-Gault) 25.0 


 


 


 





 


Glucose Level 167 mg/dL    


 


Calcium Level 9.0 mg/dL    


 


Magnesium Level 2.2 mg/dL    


 


Triglycerides Level 245 mg/dL    








  BLOOD CULTURE  Preliminary  


        NO GROWTH AFTER 2 DAYS


Imaging:


CXR 4/13


FINDINGS: Tracheostomy. Left subclavian central venous catheter tip distal SVC. 

Right PICC line tip SVC. Right jugular dialysis catheter tip lower right atrium 

although could extend to the atrial junction with the IVC. Heart size stable. No

pneumothorax. Hyperexpansion of the lungs. Perihilar


and lower lobe opacities obscuring the diaphragms likely due to mild left and 

mild basilar right layering pleural effusions. This is grossly stable.


IMPRESSION: Lines and tubes as described above. No pneumothorax. Perihilar and 

lower lobe opacities likely edema and right greater than left layering pleural 

effusions as described above. Superimposed lower lobe pneumonia not excluded.





PE:





GEN: dialyzing


HEENT: OG bilious, tracheostomy


LUNGS: clear


ABD: distended, rectal tube w/ dark liquid


NEURO/PSYCH: sedated





A/P:


Gallstone pancreatitis - necrotizing w/ phlegmon


MOSF - s/p tracheostomy, requires dialysis


Anemia - transfused again 4/12





--


Continue support.





Hemodynamically unstable?:  No


Is patient in severe pain?:  No


Is NPO status required?:  Yes











RAGHAVENDRA MURCIA         Apr 13, 2020 12:14

## 2020-04-14 VITALS — SYSTOLIC BLOOD PRESSURE: 144 MMHG | DIASTOLIC BLOOD PRESSURE: 82 MMHG

## 2020-04-14 VITALS — DIASTOLIC BLOOD PRESSURE: 94 MMHG | SYSTOLIC BLOOD PRESSURE: 132 MMHG

## 2020-04-14 VITALS — SYSTOLIC BLOOD PRESSURE: 139 MMHG | DIASTOLIC BLOOD PRESSURE: 84 MMHG

## 2020-04-14 VITALS — SYSTOLIC BLOOD PRESSURE: 153 MMHG | DIASTOLIC BLOOD PRESSURE: 75 MMHG

## 2020-04-14 VITALS — DIASTOLIC BLOOD PRESSURE: 84 MMHG | SYSTOLIC BLOOD PRESSURE: 156 MMHG

## 2020-04-14 VITALS — SYSTOLIC BLOOD PRESSURE: 141 MMHG | DIASTOLIC BLOOD PRESSURE: 81 MMHG

## 2020-04-14 VITALS — SYSTOLIC BLOOD PRESSURE: 101 MMHG | DIASTOLIC BLOOD PRESSURE: 61 MMHG

## 2020-04-14 VITALS — SYSTOLIC BLOOD PRESSURE: 149 MMHG | DIASTOLIC BLOOD PRESSURE: 82 MMHG

## 2020-04-14 VITALS — DIASTOLIC BLOOD PRESSURE: 80 MMHG | SYSTOLIC BLOOD PRESSURE: 153 MMHG

## 2020-04-14 VITALS — SYSTOLIC BLOOD PRESSURE: 142 MMHG | DIASTOLIC BLOOD PRESSURE: 78 MMHG

## 2020-04-14 VITALS — SYSTOLIC BLOOD PRESSURE: 158 MMHG | DIASTOLIC BLOOD PRESSURE: 87 MMHG

## 2020-04-14 VITALS — SYSTOLIC BLOOD PRESSURE: 159 MMHG | DIASTOLIC BLOOD PRESSURE: 88 MMHG

## 2020-04-14 VITALS — SYSTOLIC BLOOD PRESSURE: 119 MMHG | DIASTOLIC BLOOD PRESSURE: 73 MMHG

## 2020-04-14 VITALS — SYSTOLIC BLOOD PRESSURE: 165 MMHG | DIASTOLIC BLOOD PRESSURE: 102 MMHG

## 2020-04-14 VITALS — SYSTOLIC BLOOD PRESSURE: 160 MMHG | DIASTOLIC BLOOD PRESSURE: 99 MMHG

## 2020-04-14 VITALS — DIASTOLIC BLOOD PRESSURE: 70 MMHG | SYSTOLIC BLOOD PRESSURE: 112 MMHG

## 2020-04-14 VITALS — DIASTOLIC BLOOD PRESSURE: 84 MMHG | SYSTOLIC BLOOD PRESSURE: 135 MMHG

## 2020-04-14 VITALS — DIASTOLIC BLOOD PRESSURE: 55 MMHG | SYSTOLIC BLOOD PRESSURE: 107 MMHG

## 2020-04-14 VITALS — SYSTOLIC BLOOD PRESSURE: 160 MMHG | DIASTOLIC BLOOD PRESSURE: 84 MMHG

## 2020-04-14 VITALS — SYSTOLIC BLOOD PRESSURE: 118 MMHG | DIASTOLIC BLOOD PRESSURE: 65 MMHG

## 2020-04-14 VITALS — DIASTOLIC BLOOD PRESSURE: 88 MMHG | SYSTOLIC BLOOD PRESSURE: 165 MMHG

## 2020-04-14 VITALS — SYSTOLIC BLOOD PRESSURE: 104 MMHG | DIASTOLIC BLOOD PRESSURE: 59 MMHG

## 2020-04-14 VITALS — SYSTOLIC BLOOD PRESSURE: 161 MMHG | DIASTOLIC BLOOD PRESSURE: 86 MMHG

## 2020-04-14 LAB
ANION GAP SERPL CALC-SCNC: 10 MMOL/L (ref 6–14)
BUN SERPL-MCNC: 64 MG/DL (ref 7–20)
CALCIUM SERPL-MCNC: 9 MG/DL (ref 8.5–10.1)
CHLORIDE SERPL-SCNC: 98 MMOL/L (ref 98–107)
CO2 SERPL-SCNC: 29 MMOL/L (ref 21–32)
CREAT SERPL-MCNC: 1.7 MG/DL (ref 0.6–1)
ERYTHROCYTE [DISTWIDTH] IN BLOOD BY AUTOMATED COUNT: 19.2 % (ref 11.5–14.5)
GFR SERPLBLD BASED ON 1.73 SQ M-ARVRAT: 31.9 ML/MIN
GLUCOSE SERPL-MCNC: 166 MG/DL (ref 70–99)
HCT VFR BLD CALC: 27.9 % (ref 36–47)
HGB BLD-MCNC: 9.2 G/DL (ref 12–15.5)
MCH RBC QN AUTO: 32 PG (ref 25–35)
MCHC RBC AUTO-ENTMCNC: 33 G/DL (ref 31–37)
MCV RBC AUTO: 97 FL (ref 79–100)
PLATELET # BLD AUTO: 380 X10^3/UL (ref 140–400)
POTASSIUM SERPL-SCNC: 3.7 MMOL/L (ref 3.5–5.1)
RBC # BLD AUTO: 2.87 X10^6/UL (ref 3.5–5.4)
SODIUM SERPL-SCNC: 137 MMOL/L (ref 136–145)
WBC # BLD AUTO: 9.8 X10^3/UL (ref 4–11)

## 2020-04-14 RX ADMIN — DEXTROSE SCH MLS/HR: 50 INJECTION, SOLUTION INTRAVENOUS at 14:00

## 2020-04-14 RX ADMIN — DEXMEDETOMIDINE HYDROCHLORIDE PRN MLS/HR: 100 INJECTION, SOLUTION, CONCENTRATE INTRAVENOUS at 08:31

## 2020-04-14 RX ADMIN — DEXMEDETOMIDINE HYDROCHLORIDE PRN MLS/HR: 100 INJECTION, SOLUTION, CONCENTRATE INTRAVENOUS at 22:28

## 2020-04-14 RX ADMIN — MEROPENEM SCH MLS/HR: 500 INJECTION, POWDER, FOR SOLUTION INTRAVENOUS at 21:54

## 2020-04-14 RX ADMIN — INSULIN LISPRO SCH UNITS: 100 INJECTION, SOLUTION INTRAVENOUS; SUBCUTANEOUS at 17:32

## 2020-04-14 RX ADMIN — Medication PRN EACH: at 12:34

## 2020-04-14 RX ADMIN — PANTOPRAZOLE SODIUM SCH MG: 40 INJECTION, POWDER, FOR SOLUTION INTRAVENOUS at 08:01

## 2020-04-14 RX ADMIN — INSULIN LISPRO SCH UNITS: 100 INJECTION, SOLUTION INTRAVENOUS; SUBCUTANEOUS at 23:38

## 2020-04-14 RX ADMIN — INSULIN LISPRO SCH UNITS: 100 INJECTION, SOLUTION INTRAVENOUS; SUBCUTANEOUS at 06:42

## 2020-04-14 RX ADMIN — INSULIN LISPRO SCH UNITS: 100 INJECTION, SOLUTION INTRAVENOUS; SUBCUTANEOUS at 01:10

## 2020-04-14 RX ADMIN — HEPARIN SODIUM SCH UNIT: 5000 INJECTION, SOLUTION INTRAVENOUS; SUBCUTANEOUS at 08:03

## 2020-04-14 RX ADMIN — AMIODARONE HYDROCHLORIDE PRN MLS/HR: 50 INJECTION, SOLUTION INTRAVENOUS at 12:48

## 2020-04-14 RX ADMIN — DEXMEDETOMIDINE HYDROCHLORIDE PRN MLS/HR: 100 INJECTION, SOLUTION, CONCENTRATE INTRAVENOUS at 13:00

## 2020-04-14 RX ADMIN — DEXMEDETOMIDINE HYDROCHLORIDE PRN MLS/HR: 100 INJECTION, SOLUTION, CONCENTRATE INTRAVENOUS at 18:25

## 2020-04-14 RX ADMIN — ALBUMIN (HUMAN) PRN MLS/HR: 0.25 INJECTION, SOLUTION INTRAVENOUS at 11:54

## 2020-04-14 RX ADMIN — HEPARIN SODIUM SCH UNIT: 5000 INJECTION, SOLUTION INTRAVENOUS; SUBCUTANEOUS at 20:48

## 2020-04-14 RX ADMIN — DEXMEDETOMIDINE HYDROCHLORIDE PRN MLS/HR: 100 INJECTION, SOLUTION, CONCENTRATE INTRAVENOUS at 01:04

## 2020-04-14 RX ADMIN — MEROPENEM SCH MLS/HR: 500 INJECTION, POWDER, FOR SOLUTION INTRAVENOUS at 12:30

## 2020-04-14 RX ADMIN — ACETAMINOPHEN PRN MG: 650 SUPPOSITORY RECTAL at 08:01

## 2020-04-14 RX ADMIN — DEXMEDETOMIDINE HYDROCHLORIDE PRN MLS/HR: 100 INJECTION, SOLUTION, CONCENTRATE INTRAVENOUS at 15:13

## 2020-04-14 RX ADMIN — DEXMEDETOMIDINE HYDROCHLORIDE PRN MLS/HR: 100 INJECTION, SOLUTION, CONCENTRATE INTRAVENOUS at 05:07

## 2020-04-14 RX ADMIN — INSULIN LISPRO SCH UNITS: 100 INJECTION, SOLUTION INTRAVENOUS; SUBCUTANEOUS at 12:00

## 2020-04-14 NOTE — PDOC
Objective:


Objective:


D/w nurse - awake, rectal tube out - had some mucousy stool when removed.  Lines

not working this morning.


Vital Signs:





                                   Vital Signs








  Date Time  Temp Pulse Resp B/P (MAP) Pulse Ox O2 Delivery O2 Flow Rate FiO2


 


4/14/20 08:03   24  98 Ventilator  


 


4/14/20 07:00 100.2 116  160/84 (109)    





 100.2       








Labs:





Laboratory Tests








Test


 4/13/20


14:11 4/13/20


15:00 4/13/20


18:26 4/14/20


01:07


 


Glucose (Fingerstick) 151 mg/dL   213 mg/dL  174 mg/dL 


 


O2 Saturation  94 %   


 


Arterial Blood pH  7.46   


 


Arterial Blood pCO2 at


Patient Temp 


 38 mmHg 


 


 





 


Arterial Blood pO2 at Patient


Temp 


 74 mmHg 


 


 





 


Arterial Blood HCO3  26 mmol/L   


 


Arterial Blood Base Excess  2 mmol/L   


 


FiO2  35   


 


Test


 4/14/20


06:39 4/14/20


09:00 


 





 


Glucose (Fingerstick) 157 mg/dL    


 


Sodium Level  137 mmol/L   


 


Potassium Level  3.7 mmol/L   


 


Chloride Level  98 mmol/L   


 


Carbon Dioxide Level  29 mmol/L   


 


Anion Gap  10   


 


Blood Urea Nitrogen  64 mg/dL   


 


Creatinine  1.7 mg/dL   


 


Estimated GFR


(Cockcroft-Gault) 


 31.9 


 


 





 


Glucose Level  166 mg/dL   


 


Calcium Level  9.0 mg/dL   








Imaging:


CXR 4/14


IMPRESSION:  


1. Stable life support devices.


2. Stable moderate pleural effusions and bilateral hazy opacities.





PE:





GEN: looks uncomfortable


LUNGS: trach/vent


HEART: tachycardic


ABD: quiet, distended (some better)


NEURO/PSYCH: awake, makes eye contact





A/P:


Gallstone pancreatitis, MOSF, fever





--


Continue same per GI.





Hemodynamically unstable?:  No


Is patient in severe pain?:  No


Is NPO status required?:  Yes











RAGHAVENDRA MURCIA         Apr 14, 2020 09:19

## 2020-04-14 NOTE — PDOC
PULMONARY PROGRESS NOTES


Subjective


Patient intubated on 3/23 , s/p trach


remained on assist control/ DOES NOT TOLERATE WEANING OFF FENTANYL


RESTLESS


TACHYCARDIC


FEVER


Vitals





Vital Signs








  Date Time  Temp Pulse Resp B/P (MAP) Pulse Ox O2 Delivery O2 Flow Rate FiO2


 


4/14/20 11:50     100 Ventilator  


 


4/14/20 10:52  134 36 153/75 (101)    


 


4/14/20 09:00 100.4       





 100.4       








General:  Lethargic


Lungs:  Other (dimished in BLL)


Cardiovascular:  S1, S2


Abdomen:  Non-tender, Other (distended)


Extremities:  Other (+3 generalized edema )


Skin:  Warm, Dry


Labs





Laboratory Tests








Test


 4/12/20


12:42 4/12/20


17:53 4/13/20


00:02 4/13/20


06:09


 


Glucose (Fingerstick)


 223 mg/dL


(70-99) 166 mg/dL


(70-99) 201 mg/dL


(70-99) 175 mg/dL


(70-99)


 


Test


 4/13/20


06:10 4/13/20


14:11 4/13/20


15:00 4/13/20


18:26


 


White Blood Count


 5.3 x10^3/uL


(4.0-11.0) 


 


 





 


Red Blood Count


 2.48 x10^6/uL


(3.50-5.40) 


 


 





 


Hemoglobin


 8.0 g/dL


(12.0-15.5) 


 


 





 


Hematocrit


 24.3 %


(36.0-47.0) 


 


 





 


Mean Corpuscular Volume 98 fL ()    


 


Mean Corpuscular Hemoglobin 32 pg (25-35)    


 


Mean Corpuscular Hemoglobin


Concent 33 g/dL


(31-37) 


 


 





 


Red Cell Distribution Width


 19.3 %


(11.5-14.5) 


 


 





 


Platelet Count


 318 x10^3/uL


(140-400) 


 


 





 


Neutrophils (%) (Auto) 76 % (31-73)    


 


Lymphocytes (%) (Auto) 10 % (24-48)    


 


Monocytes (%) (Auto) 12 % (0-9)    


 


Eosinophils (%) (Auto) 2 % (0-3)    


 


Basophils (%) (Auto) 0 % (0-3)    


 


Neutrophils # (Auto)


 4.0 x10^3/uL


(1.8-7.7) 


 


 





 


Lymphocytes # (Auto)


 0.5 x10^3/uL


(1.0-4.8) 


 


 





 


Monocytes # (Auto)


 0.6 x10^3/uL


(0.0-1.1) 


 


 





 


Eosinophils # (Auto)


 0.1 x10^3/uL


(0.0-0.7) 


 


 





 


Basophils # (Auto)


 0.0 x10^3/uL


(0.0-0.2) 


 


 





 


Sodium Level


 135 mmol/L


(136-145) 


 


 





 


Potassium Level


 3.6 mmol/L


(3.5-5.1) 


 


 





 


Chloride Level


 98 mmol/L


() 


 


 





 


Carbon Dioxide Level


 23 mmol/L


(21-32) 


 


 





 


Anion Gap 14 (6-14)    


 


Blood Urea Nitrogen


 80 mg/dL


(7-20) 


 


 





 


Creatinine


 2.1 mg/dL


(0.6-1.0) 


 


 





 


Estimated GFR


(Cockcroft-Gault) 25.0 


 


 


 





 


Glucose Level


 167 mg/dL


(70-99) 


 


 





 


Calcium Level


 9.0 mg/dL


(8.5-10.1) 


 


 





 


Magnesium Level


 2.2 mg/dL


(1.8-2.4) 


 


 





 


Triglycerides Level


 245 mg/dL


(0-150) 


 


 





 


Glucose (Fingerstick)


 


 151 mg/dL


(70-99) 


 213 mg/dL


(70-99)


 


O2 Saturation   94 % (92-99)  


 


Arterial Blood pH


 


 


 7.46


(7.35-7.45) 





 


Arterial Blood pCO2 at


Patient Temp 


 


 38 mmHg


(35-46) 





 


Arterial Blood pO2 at Patient


Temp 


 


 74 mmHg


() 





 


Arterial Blood HCO3


 


 


 26 mmol/L


(21-28) 





 


Arterial Blood Base Excess


 


 


 2 mmol/L


(-3-3) 





 


FiO2   35  


 


Test


 4/14/20


01:07 4/14/20


06:39 4/14/20


09:00 





 


Glucose (Fingerstick)


 174 mg/dL


(70-99) 157 mg/dL


(70-99) 


 





 


White Blood Count


 


 


 9.8 x10^3/uL


(4.0-11.0) 





 


Red Blood Count


 


 


 2.87 x10^6/uL


(3.50-5.40) 





 


Hemoglobin


 


 


 9.2 g/dL


(12.0-15.5) 





 


Hematocrit


 


 


 27.9 %


(36.0-47.0) 





 


Mean Corpuscular Volume   97 fL ()  


 


Mean Corpuscular Hemoglobin   32 pg (25-35)  


 


Mean Corpuscular Hemoglobin


Concent 


 


 33 g/dL


(31-37) 





 


Red Cell Distribution Width


 


 


 19.2 %


(11.5-14.5) 





 


Platelet Count


 


 


 380 x10^3/uL


(140-400) 





 


Sodium Level


 


 


 137 mmol/L


(136-145) 





 


Potassium Level


 


 


 3.7 mmol/L


(3.5-5.1) 





 


Chloride Level


 


 


 98 mmol/L


() 





 


Carbon Dioxide Level


 


 


 29 mmol/L


(21-32) 





 


Anion Gap   10 (6-14)  


 


Blood Urea Nitrogen


 


 


 64 mg/dL


(7-20) 





 


Creatinine


 


 


 1.7 mg/dL


(0.6-1.0) 





 


Estimated GFR


(Cockcroft-Gault) 


 


 31.9 


 





 


Glucose Level


 


 


 166 mg/dL


(70-99) 





 


Calcium Level


 


 


 9.0 mg/dL


(8.5-10.1) 











Laboratory Tests








Test


 4/13/20


14:11 4/13/20


15:00 4/13/20


18:26 4/14/20


01:07


 


Glucose (Fingerstick)


 151 mg/dL


(70-99) 


 213 mg/dL


(70-99) 174 mg/dL


(70-99)


 


O2 Saturation  94 % (92-99)   


 


Arterial Blood pH


 


 7.46


(7.35-7.45) 


 





 


Arterial Blood pCO2 at


Patient Temp 


 38 mmHg


(35-46) 


 





 


Arterial Blood pO2 at Patient


Temp 


 74 mmHg


() 


 





 


Arterial Blood HCO3


 


 26 mmol/L


(21-28) 


 





 


Arterial Blood Base Excess


 


 2 mmol/L


(-3-3) 


 





 


FiO2  35   


 


Test


 4/14/20


06:39 4/14/20


09:00 


 





 


Glucose (Fingerstick)


 157 mg/dL


(70-99) 


 


 





 


White Blood Count


 


 9.8 x10^3/uL


(4.0-11.0) 


 





 


Red Blood Count


 


 2.87 x10^6/uL


(3.50-5.40) 


 





 


Hemoglobin


 


 9.2 g/dL


(12.0-15.5) 


 





 


Hematocrit


 


 27.9 %


(36.0-47.0) 


 





 


Mean Corpuscular Volume  97 fL ()   


 


Mean Corpuscular Hemoglobin  32 pg (25-35)   


 


Mean Corpuscular Hemoglobin


Concent 


 33 g/dL


(31-37) 


 





 


Red Cell Distribution Width


 


 19.2 %


(11.5-14.5) 


 





 


Platelet Count


 


 380 x10^3/uL


(140-400) 


 





 


Sodium Level


 


 137 mmol/L


(136-145) 


 





 


Potassium Level


 


 3.7 mmol/L


(3.5-5.1) 


 





 


Chloride Level


 


 98 mmol/L


() 


 





 


Carbon Dioxide Level


 


 29 mmol/L


(21-32) 


 





 


Anion Gap  10 (6-14)   


 


Blood Urea Nitrogen


 


 64 mg/dL


(7-20) 


 





 


Creatinine


 


 1.7 mg/dL


(0.6-1.0) 


 





 


Estimated GFR


(Cockcroft-Gault) 


 31.9 


 


 





 


Glucose Level


 


 166 mg/dL


(70-99) 


 





 


Calcium Level


 


 9.0 mg/dL


(8.5-10.1) 


 











Medications





Active Scripts








 Medications  Dose


 Route/Sig


 Max Daily Dose Days Date Category


 


 Bisoprolol


 Fumarate 5 Mg


 Tablet  10 Mg


 PO DAILY


   3/16/20 Reported








Comments


Chest x-ray reviewed 4/14





IMPRESSION: 


1. moderate pleural effusions with associated atelectasis





Impression


.


IMPRESSION:


1.  Acute hypoxemic respiratory failure secondary to ARDS status post trach,


2.  Gallstone pancreatitis.with NECROSIS, NOT A SURGICAL CANDIDATE


3.  Severe metabolic acidosis.stable


4.  Acute kidney injury-stable, ON HD


5.  Acute gallstone pancreatitis.


6.  Hypoalbuminemia.


7.  Moderate persistent effusions


8.  Fever,


9.  Chronic anemia


10. Covid 19 testing negative


11.  anemia 


1





Plan


.


CPAP trial on hold today as very restless, tachycardic


AC mode


febrile, w/u per ID. new lines, ct chest/ abdomen


Hold off on thoracentesis


HD


follow surgery and ID rec


Discussed with RN and RT.  


Transfuse prn, check H/H -prn


Antibiotics per ID, 


Follow nephrology recs 


Nutritional support with TPN


off pressors


Prognosis is guarded





DVT/GI PPX 


d/w RN/RT








CC time : 30 minutes reviewing labs, patient care and discussing will care team











SHARYN SOLORZANO MD                 Apr 14, 2020 12:16

## 2020-04-14 NOTE — RAD
EXAM: CHEST AP ONLY

 

INDICATION: Central line placement.

 

TECHNIQUE: Single AP view 

 

COMPARISON: Earlier same day chest x-ray at 6:43 AM

 

FINDINGS:

 

Stable tracheostomy tube and right jugular approach dual-lumen central 

venous catheter as well as right PICC line. Enteric tube remains present 

passing below the diaphragms

 

Patient's left jugular approach central venous catheter has been 

repositioned or replaced such that the tip better projects over the right 

atrium rather than over the azygos vein.

 

The heart is obscured by bilateral pleural effusions.

 

Great vessels show prominence of the aortic knob measuring up to 4.2 cm in

maximum diameter.

 

No hilar or mediastinal masses identified.

 

Lungs show low lung volumes and bilateral basilar opacities compatible 

with atelectasis.

 

No pneumothorax. Moderate bilateral pleural effusions remain present.

 

There are no significant osseous abnormalities.

 

IMPRESSION:

 

Interval repositioning or replacement of a left jugular approach central 

venous catheter with tip projecting over the right atrium as described. No

pneumothorax.

 

 

Electronically signed by: Adriana Forrest MD (4/14/2020 2:50 PM) 

[id number]

## 2020-04-14 NOTE — NUR
daughter called regarding results of today's CT scan. She was aware of the CT scan being 
worse. Daughter woul;d like to talk with providers regarding what to next.  I spoke with Dr. Gonsalez. Per Gordon Stephens is to talk with family in am

## 2020-04-14 NOTE — NUR
returned to room at 1330 after completing dialysis and ct of abdomen chest and pelvis. pt 
tolerated well. pt on levophed due to low bp in dialysis.

interventional radiology at bedside placing left triple lumen catheter.

## 2020-04-14 NOTE — NUR
Pharmacy TPN Dosing Note



S: JESENIA RICH is a 49 year old F Currently receiving Central Continuous TPN started 
03/18/20



B:Pertinent PMH: Necrotizing pancreatitis

Height: 5 feet, 8 inches

Weight: 106.0 kg



Current diet: NPO 



LABS:

Sodium:    137 

Potassium: 3.7 

Chloride:  98 

Calcium:   9.0 

Corrected Calcium: 9.88 

Magnesium: 2.2 (4/13) 

CO2:       29 

SCr:       1.7 

Glucose:   151-213 

Albumin:   2.9 

AST:       47 (4/5) 

ALT:       20 (4/5) 



TPN FORMULA:

TPN TYPE:  Central Continuous

AMINO ACIDS:         125 gm

DEXTROSE:            225 gm

LIPIDS:              20 gm

SODIUM CHLORIDE:     100 mEq

SODIUM ACETATE:      -- mEq

SODIUM PHOSPHATE:    - mmol

POTASSIUM CHLORIDE:  20 mEq

POTASSIUM ACETATE:   -- mEq

POTASSIUM PHOSPHATE: 19 mmol

MAGNESIUM:           12 mEq

CALCIUM:             15 mEq

INSULIN:             40 units

MULTIPLE VITAMIN:    10 ml

TRACE ELEMENTS:      0.5 ml(s)



TPN PLAN:  

-Continues macros per dietician recommendations

-Electrolytes within normal limits and patient to receive dialysis today.

-CMP, Mag and Phos ordered for tomorrow.







R: Continue same TPN with no additional adjustments at this time. 

Will monitor electrolytes, glucose, and tolerance to TPN.



 BRANDI CORDOBA, [facility], 04/14/20 [id number]

## 2020-04-14 NOTE — PDOC
SURGICAL PROGRESS NOTE


Subjective


seen in dialysis


trach in place


Vital Signs





Vital Signs








  Date Time  Temp Pulse Resp B/P (MAP) Pulse Ox O2 Delivery O2 Flow Rate FiO2


 


4/14/20 08:03   24  98 Ventilator  


 


4/14/20 07:00 100.2 116  160/84 (109)    





 100.2       








I&O











Intake and Output 


 


 4/14/20





 07:00


 


Intake Total 2888.8 ml


 


Output Total 724 ml


 


Balance 2164.8 ml


 


 


 


Intake Oral 0 ml


 


IV Total 2888.8 ml


 


Output Urine Total 424 ml


 


Gastric Drainage Total 300 ml








General:  Cooperative, No acute distress


HEENT:  Other (trach intact)


Abdomen:  Other (distended )


Labs





Laboratory Tests








Test


 4/12/20


12:00 4/12/20


12:42 4/12/20


17:53 4/13/20


00:02


 


Urine Collection Type Unknown    


 


Urine Color Brown    


 


Urine Clarity Cloudy    


 


Urine pH 5.0 (<5.0-8.0)    


 


Urine Specific Gravity


 >=1.030


(1.000-1.030) 


 


 





 


Urine Protein


 100 mg/dL


(NEG-TRACE) 


 


 





 


Urine Glucose (UA)


 100 mg/dL


(NEG) 


 


 





 


Urine Ketones (Stick)


 Trace mg/dL


(NEG) 


 


 





 


Urine Blood Large (NEG)    


 


Urine Nitrite Positive (NEG)    


 


Urine Bilirubin Small (NEG)    


 


Urine Urobilinogen Dipstick


 1.0 mg/dL (0.2


mg/dL) 


 


 





 


Urine Leukocyte Esterase Small (NEG)    


 


Urine RBC >40 /HPF (0-2)    


 


Urine WBC 1-4 /HPF (0-4)    


 


Urine Squamous Epithelial


Cells Occ /LPF 


 


 


 





 


Urine Transitional Epithelial


Cells Few /LPF 


 


 


 





 


Urine Amorphous Sediment Present /HPF    


 


Urine Bacteria


 Few /HPF


(0-FEW) 


 


 





 


Urine Hyaline Casts Few /HPF    


 


Urine Mucus Slight /LPF    


 


Glucose (Fingerstick)


 


 223 mg/dL


(70-99) 166 mg/dL


(70-99) 201 mg/dL


(70-99)


 


Test


 4/13/20


06:09 4/13/20


06:10 4/13/20


14:11 4/13/20


15:00


 


Glucose (Fingerstick)


 175 mg/dL


(70-99) 


 151 mg/dL


(70-99) 





 


White Blood Count


 


 5.3 x10^3/uL


(4.0-11.0) 


 





 


Red Blood Count


 


 2.48 x10^6/uL


(3.50-5.40) 


 





 


Hemoglobin


 


 8.0 g/dL


(12.0-15.5) 


 





 


Hematocrit


 


 24.3 %


(36.0-47.0) 


 





 


Mean Corpuscular Volume  98 fL ()   


 


Mean Corpuscular Hemoglobin  32 pg (25-35)   


 


Mean Corpuscular Hemoglobin


Concent 


 33 g/dL


(31-37) 


 





 


Red Cell Distribution Width


 


 19.3 %


(11.5-14.5) 


 





 


Platelet Count


 


 318 x10^3/uL


(140-400) 


 





 


Neutrophils (%) (Auto)  76 % (31-73)   


 


Lymphocytes (%) (Auto)  10 % (24-48)   


 


Monocytes (%) (Auto)  12 % (0-9)   


 


Eosinophils (%) (Auto)  2 % (0-3)   


 


Basophils (%) (Auto)  0 % (0-3)   


 


Neutrophils # (Auto)


 


 4.0 x10^3/uL


(1.8-7.7) 


 





 


Lymphocytes # (Auto)


 


 0.5 x10^3/uL


(1.0-4.8) 


 





 


Monocytes # (Auto)


 


 0.6 x10^3/uL


(0.0-1.1) 


 





 


Eosinophils # (Auto)


 


 0.1 x10^3/uL


(0.0-0.7) 


 





 


Basophils # (Auto)


 


 0.0 x10^3/uL


(0.0-0.2) 


 





 


Sodium Level


 


 135 mmol/L


(136-145) 


 





 


Potassium Level


 


 3.6 mmol/L


(3.5-5.1) 


 





 


Chloride Level


 


 98 mmol/L


() 


 





 


Carbon Dioxide Level


 


 23 mmol/L


(21-32) 


 





 


Anion Gap  14 (6-14)   


 


Blood Urea Nitrogen


 


 80 mg/dL


(7-20) 


 





 


Creatinine


 


 2.1 mg/dL


(0.6-1.0) 


 





 


Estimated GFR


(Cockcroft-Gault) 


 25.0 


 


 





 


Glucose Level


 


 167 mg/dL


(70-99) 


 





 


Calcium Level


 


 9.0 mg/dL


(8.5-10.1) 


 





 


Magnesium Level


 


 2.2 mg/dL


(1.8-2.4) 


 





 


Triglycerides Level


 


 245 mg/dL


(0-150) 


 





 


O2 Saturation    94 % (92-99) 


 


Arterial Blood pH


 


 


 


 7.46


(7.35-7.45)


 


Arterial Blood pCO2 at


Patient Temp 


 


 


 38 mmHg


(35-46)


 


Arterial Blood pO2 at Patient


Temp 


 


 


 74 mmHg


()


 


Arterial Blood HCO3


 


 


 


 26 mmol/L


(21-28)


 


Arterial Blood Base Excess


 


 


 


 2 mmol/L


(-3-3)


 


FiO2    35 


 


Test


 4/13/20


18:26 4/14/20


01:07 4/14/20


06:39 4/14/20


09:00


 


Glucose (Fingerstick)


 213 mg/dL


(70-99) 174 mg/dL


(70-99) 157 mg/dL


(70-99) 





 


White Blood Count


 


 


 


 9.8 x10^3/uL


(4.0-11.0)


 


Red Blood Count


 


 


 


 2.87 x10^6/uL


(3.50-5.40)


 


Hemoglobin


 


 


 


 9.2 g/dL


(12.0-15.5)


 


Hematocrit


 


 


 


 27.9 %


(36.0-47.0)


 


Mean Corpuscular Volume    97 fL () 


 


Mean Corpuscular Hemoglobin    32 pg (25-35) 


 


Mean Corpuscular Hemoglobin


Concent 


 


 


 33 g/dL


(31-37)


 


Red Cell Distribution Width


 


 


 


 19.2 %


(11.5-14.5)


 


Platelet Count


 


 


 


 380 x10^3/uL


(140-400)


 


Sodium Level


 


 


 


 137 mmol/L


(136-145)


 


Potassium Level


 


 


 


 3.7 mmol/L


(3.5-5.1)


 


Chloride Level


 


 


 


 98 mmol/L


()


 


Carbon Dioxide Level


 


 


 


 29 mmol/L


(21-32)


 


Anion Gap    10 (6-14) 


 


Blood Urea Nitrogen


 


 


 


 64 mg/dL


(7-20)


 


Creatinine


 


 


 


 1.7 mg/dL


(0.6-1.0)


 


Estimated GFR


(Cockcroft-Gault) 


 


 


 31.9 





 


Glucose Level


 


 


 


 166 mg/dL


(70-99)


 


Calcium Level


 


 


 


 9.0 mg/dL


(8.5-10.1)








Laboratory Tests








Test


 4/13/20


14:11 4/13/20


15:00 4/13/20


18:26 4/14/20


01:07


 


Glucose (Fingerstick)


 151 mg/dL


(70-99) 


 213 mg/dL


(70-99) 174 mg/dL


(70-99)


 


O2 Saturation  94 % (92-99)   


 


Arterial Blood pH


 


 7.46


(7.35-7.45) 


 





 


Arterial Blood pCO2 at


Patient Temp 


 38 mmHg


(35-46) 


 





 


Arterial Blood pO2 at Patient


Temp 


 74 mmHg


() 


 





 


Arterial Blood HCO3


 


 26 mmol/L


(21-28) 


 





 


Arterial Blood Base Excess


 


 2 mmol/L


(-3-3) 


 





 


FiO2  35   


 


Test


 4/14/20


06:39 4/14/20


09:00 


 





 


Glucose (Fingerstick)


 157 mg/dL


(70-99) 


 


 





 


White Blood Count


 


 9.8 x10^3/uL


(4.0-11.0) 


 





 


Red Blood Count


 


 2.87 x10^6/uL


(3.50-5.40) 


 





 


Hemoglobin


 


 9.2 g/dL


(12.0-15.5) 


 





 


Hematocrit


 


 27.9 %


(36.0-47.0) 


 





 


Mean Corpuscular Volume  97 fL ()   


 


Mean Corpuscular Hemoglobin  32 pg (25-35)   


 


Mean Corpuscular Hemoglobin


Concent 


 33 g/dL


(31-37) 


 





 


Red Cell Distribution Width


 


 19.2 %


(11.5-14.5) 


 





 


Platelet Count


 


 380 x10^3/uL


(140-400) 


 





 


Sodium Level


 


 137 mmol/L


(136-145) 


 





 


Potassium Level


 


 3.7 mmol/L


(3.5-5.1) 


 





 


Chloride Level


 


 98 mmol/L


() 


 





 


Carbon Dioxide Level


 


 29 mmol/L


(21-32) 


 





 


Anion Gap  10 (6-14)   


 


Blood Urea Nitrogen


 


 64 mg/dL


(7-20) 


 





 


Creatinine


 


 1.7 mg/dL


(0.6-1.0) 


 





 


Estimated GFR


(Cockcroft-Gault) 


 31.9 


 


 





 


Glucose Level


 


 166 mg/dL


(70-99) 


 





 


Calcium Level


 


 9.0 mg/dL


(8.5-10.1) 


 











Problem List


Problems


Medical Problems:


(1) Acute pancreatitis


Status: Acute  





(2) Cholelithiasis


Status: Acute  








Assessment/Plan


supportive care











SAURAV NASH            Apr 14, 2020 09:43

## 2020-04-14 NOTE — NUR
trach care completed with new soft collar placed. picc line removed and catheter tip sent 
for culture.

left ij triple lumen catheter ordered to use for blood draws, tpn and iv infusions.

second blood culture set sent to lab via draw from new central line.

levophed iv drip being titrated and weaning off of pt.

pepe catheter removed and new 16 [place] pepe catheter placed without difficulty.

pt calm and cooperative this afternoon. no bolus' of precedex required or another dose of 
ativan. one bolus of fentanyl given when placing new central line at bedside.

## 2020-04-14 NOTE — NUR
dr conn attempted to replace left sc triple lumen catheter at bedside. unsuccessful. 

1 set of blood cultures sent to lab from dialysis catheter.

## 2020-04-14 NOTE — NUR
SS following up with discharge planning. SS discussed with RNKassie. No new changes. Pt 
remains on the vent with trach and TPN and hemodialysis. Pt's family working with [facility] to 
submit Medicaid application. SS will continue to follow for discharge planning.

## 2020-04-14 NOTE — RAD
Procedure: Ultrasound guided non-tunneled was internal jugular Central Venous

Catheter Placement. 



The procedure, risk and complications to include bleeding, infection,

pneumothorax and arrhythmia, were discussed at length with the patient's DPOA

and they understood and wished to proceed.  All questions were answered. 

Consent form signed.



The left neck was prepped and draped using maximal sterile technique and a 1%

Xylocaine was used for local anesthesia.  



Ultrasound-guided access:  The neck was scanned by ultrasound and the left

internal jugular vein is patent and compressible.  An ultrasound image was

saved and sent to PACS.  Under ultrasound guidance, a single wall puncture was

made into the vein followed by wire placement and tract dilation. A

triple-lumen central venous catheter was advanced without resistance. Catheter

was flushed and sutured to the skin. Sterile dressing was applied.



Portable radiographic Image of the Chest:  The central venous catheter tip is

in the right atrium. Negative for pneumothorax.



Complication: none  



Contrast:  none



Sedation:  none



The patient tolerated the procedure well and there were no immediate

complications. Procedure was performed at bedside.



Conclusion:  Successful left internal jugular non-tunneled triple-lumen

central venous catheter placement. Catheter is okay to use.

## 2020-04-14 NOTE — PDOC
Renal-Progress Notes


Subjective Notes


Notes


NO NEW CHANGES





History of Present Illness


Hx of present illness


STABLE





Vitals


Vitals





Vital Signs








  Date Time  Temp Pulse Resp B/P (MAP) Pulse Ox O2 Delivery O2 Flow Rate FiO2


 


4/14/20 11:50     100 Ventilator  


 


4/14/20 10:52  134 36 153/75 (101)    


 


4/14/20 09:00 100.4       





 100.4       








Weight


Weight [ ]





I.O.


Intake and Output











Intake and Output 


 


 4/14/20





 07:00


 


Intake Total 2888.8 ml


 


Output Total 724 ml


 


Balance 2164.8 ml


 


 


 


Intake Oral 0 ml


 


IV Total 2888.8 ml


 


Output Urine Total 424 ml


 


Gastric Drainage Total 300 ml











Labs


Labs





Laboratory Tests








Test


 4/13/20


14:11 4/13/20


15:00 4/13/20


18:26 4/14/20


01:07


 


Glucose (Fingerstick)


 151 mg/dL


(70-99) 


 213 mg/dL


(70-99) 174 mg/dL


(70-99)


 


O2 Saturation  94 % (92-99)   


 


Arterial Blood pH


 


 7.46


(7.35-7.45) 


 





 


Arterial Blood pCO2 at


Patient Temp 


 38 mmHg


(35-46) 


 





 


Arterial Blood pO2 at Patient


Temp 


 74 mmHg


() 


 





 


Arterial Blood HCO3


 


 26 mmol/L


(21-28) 


 





 


Arterial Blood Base Excess


 


 2 mmol/L


(-3-3) 


 





 


FiO2  35   


 


Test


 4/14/20


06:39 4/14/20


09:00 


 





 


Glucose (Fingerstick)


 157 mg/dL


(70-99) 


 


 





 


White Blood Count


 


 9.8 x10^3/uL


(4.0-11.0) 


 





 


Red Blood Count


 


 2.87 x10^6/uL


(3.50-5.40) 


 





 


Hemoglobin


 


 9.2 g/dL


(12.0-15.5) 


 





 


Hematocrit


 


 27.9 %


(36.0-47.0) 


 





 


Mean Corpuscular Volume  97 fL ()   


 


Mean Corpuscular Hemoglobin  32 pg (25-35)   


 


Mean Corpuscular Hemoglobin


Concent 


 33 g/dL


(31-37) 


 





 


Red Cell Distribution Width


 


 19.2 %


(11.5-14.5) 


 





 


Platelet Count


 


 380 x10^3/uL


(140-400) 


 





 


Sodium Level


 


 137 mmol/L


(136-145) 


 





 


Potassium Level


 


 3.7 mmol/L


(3.5-5.1) 


 





 


Chloride Level


 


 98 mmol/L


() 


 





 


Carbon Dioxide Level


 


 29 mmol/L


(21-32) 


 





 


Anion Gap  10 (6-14)   


 


Blood Urea Nitrogen


 


 64 mg/dL


(7-20) 


 





 


Creatinine


 


 1.7 mg/dL


(0.6-1.0) 


 





 


Estimated GFR


(Cockcroft-Gault) 


 31.9 


 


 





 


Glucose Level


 


 166 mg/dL


(70-99) 


 





 


Calcium Level


 


 9.0 mg/dL


(8.5-10.1) 


 














Micro


Micro





Microbiology


4/10/20 Blood Culture - Preliminary, Resulted


          NO GROWTH AFTER 3 DAYS


4/4/20 Urine Culture - Final, Complete


         


4/4/20 Urine Culture Result 1 (ANSON) - Final, Complete





Review of Systems


Constitutional:  yes: other (ON THE VENT)





Physical Exam


General Appearance:  other (ON THE VENT)


Skin:  warm


Respiratory:  decreased breath sounds


Heart:  S1S2


Abdomen:  soft, bowel sounds present


Genitourinary:  bladder flat


Extremities:  pulses present, edema


Neurology:  other (SEDATED)





Assessment


Assessment


IMP





[id number] CRRT


ANEMIA


LEUCOCYTOSIS-IMPROVING


ANASARCA DUE TO 3RD SPACING


HYPERKALEMIA-RESOLVED


ACIDOSIS AND ACIDEMIA-CONTROLLED


ACUTE REPS FAILURE


ACUTE PANCREATITIS


HYPOALBUMINEMIA


HYPOCALCEMIA-FROM SAPONIFICATION-BETTER


POOR HD CATHETER FUNCTION





PLAN








HD TODAY 


UF TO DW 


TPN TO CONTINUE AS NEEDED


PRBC AS NEEDED


TREAT LOW CA ONLY FOR ARRHYTHMIA OR SYMPTOMS


CONT YOLI


VENT SUPPORT


PRESSORS AS NEEDED


ANTIBIOTICS


WILL FOLLOW


VERY POOR PROGNOSIS











BETH HEIN MD                 Apr 14, 2020 12:17

## 2020-04-14 NOTE — RAD
CT chest without contrast, CT abdomen pelvis without contrast 

 

HISTORY: Fever, pancreatitis, ileus, trach

 

CT CHEST:

CT scan the chest was done without contrast. There is a tracheostomy tube 

in good position. There are large bilateral pleural effusions. There is 

atelectasis in both lung bases. There is no mediastinal adenopathy. Right 

arm PICC line extends to the superior vena cava. There is a temporary 

dialysis catheter extending to the vena cava near the atrium.

 

IMPRESSION:

1. Large bilateral effusions.

2. Atelectasis of both lungs.

 

End impression

 

CT abdomen pelvis

 

CT scan the abdomen pelvis was done following CT chest with contrast. NG 

tube extends into the stomach. Spleen is normal. There is no mass or 

hydronephrosis in the kidneys. There is a gallstone the gallbladder. A 

focal liver lesion is not identified. There is diffuse necrosis of the 

pancreas. There is peripancreatic fluid. The pattern is similar to the 

recent study. There is fluid in the paracolic gutters. There is 

retroperitoneal fluid surrounding the kidneys. Ascites extends into the 

pelvis. Ascites is similar to the prior study. There is no bowel 

obstruction. There is no free air.

 

IMPRESSION:

1. Diffuse pancreatic necrosis.

2. A extensive retroperitoneal fluid and inflammation.

3. Cholelithiasis.

4. Moderate to large volume ascites with little change.

 

 

 

 

 

 

 

 

 

PQRS Compliance Statement:

 

One or more of the following individualized dose reduction techniques were

utilized for this examination:  

1. Automated exposure control  

2. Adjustment of the mA and/or kV according to patient size  

3. Use of iterative reconstruction technique

 

Electronically signed by: Sourav Frias MD (4/14/2020 1:47 PM) [id number]

## 2020-04-14 NOTE — PDOC
PROGRESS NOTES


Chief Complaint


Chief Complaint


Respiratory failure requiring mechanical ventilation (on vent since 3/23)


bilateral pleural effusions/pulm edema 


Sepsis


Severe Acute gallstone pancreatitis (not a surgical candidate at this time) with

necrosis


Acute kidney failure now requiring dialysis


Salpingitis


Gallstones (Calculus of gallbladder with acute cholecystitis without 

obstruction)


HTN 


Leukocytosis 


Hypoxia


Uterine fibroid


Intractable pain


Intractable nausea


Covid 19 negative. 


Acute on chronic anemia 


EEG: No seizure activity.


ESRD on HD


Hyperglycemia, persistently in 200s





History of Present Illness


History of Present Illness


Ms Tadeo is a 50yo F w/ PMHx HTN, prediabetes who presented to the emergency 

room with complaints of abdominal pain on 3/16/2020. Found with Lipase 58614, 

, , Bilirubin 1.4.


CT abdomen confirms pancreatic inflammation, peripancreatic fluid and 

inflammatory changes around the pancreas consistent with pancreatitis. 

Cholelithiasis and 1.4cm uterine fibroid as well as possible left salpingitis. 

Admitted for further care


GI, General surgery, ID, Pulm consulted.





3/17: No urine output. Added dilaudid for pain, PICC placed per IR. Renal US 

negative.Seen bedside in ICU, given 2L additional NSS and albumin infusion. 

Still hypotensive, started on levophed. Repeat CT abdomen with necrosis. 


3/18: O2 saturation 87% on nasal cannula oxygen. Dialysis catheter per 

nephrology


3/19: She is now on BiPAP appears more ill, now on dialysis


3/20: Seen on BiPAP. Her mother and another family member are present and seemed

to be good support for her. Currently on dialysis. Appears critically ill


3/21: Overnight Tmax 101.7 , still on BiPAP FiO2 40%, still on low dose Levophed

gtt, TPN initiated. On dialysis


4/6: Tracheostomy


4/12: S/p tracheostomy on vent spontaneous respirations with 5 of pressure 

support 35% FiO2, rectal tube and a [place], off pressors


4/13: Off pressors. Seen on dialysis this morning. BUN 80. Tracking with her 

eyes. Still on vent via trach.





BUN 68, Cr 1.7. Temp 100.2F axillary. WBC 9.8. Still on vent via trach. Tracking

reasonably well. In obvious discomfort.





Plan:


Fungal cultures, remove PICC and IJ. D/w IR to replace left IJ


CT chest/abd/pelvis





Vitals


Vitals





Vital Signs








  Date Time  Temp Pulse Resp B/P (MAP) Pulse Ox O2 Delivery O2 Flow Rate FiO2


 


4/14/20 08:03   24  98 Ventilator  


 


4/14/20 07:00 100.2 116  160/84 (109)    





 100.2       











Physical Exam


General:  Other (eyes open)


Heart:  Other (increased rate)


Lungs:  Other (dimished in BLL)


Abdomen:  Soft, Other (distended )


Extremities:  No edema, Other (SOME CLUBBING )


Skin:  Other (mottling noted to extremities )





Labs


LABS





Laboratory Tests








Test


 4/13/20


14:11 4/13/20


15:00 4/13/20


18:26 4/14/20


01:07


 


Glucose (Fingerstick)


 151 mg/dL


(70-99) 


 213 mg/dL


(70-99) 174 mg/dL


(70-99)


 


O2 Saturation  94 % (92-99)   


 


Arterial Blood pH


 


 7.46


(7.35-7.45) 


 





 


Arterial Blood pCO2 at


Patient Temp 


 38 mmHg


(35-46) 


 





 


Arterial Blood pO2 at Patient


Temp 


 74 mmHg


() 


 





 


Arterial Blood HCO3


 


 26 mmol/L


(21-28) 


 





 


Arterial Blood Base Excess


 


 2 mmol/L


(-3-3) 


 





 


FiO2  35   


 


Test


 4/14/20


06:39 


 


 





 


Glucose (Fingerstick)


 157 mg/dL


(70-99) 


 


 














Assessment and Plan


Assessmemt and Plan


Problems


Medical Problems:


(1) Acute pancreatitis


Status: Acute  





(2) Cholelithiasis


Status: Acute  











Comment


Review of Relevant


I have reviewed the following items josy (where applicable) has been applied.


Labs





Laboratory Tests








Test


 4/12/20


12:00 4/12/20


12:42 4/12/20


17:53 4/13/20


00:02


 


Urine Collection Type Unknown    


 


Urine Color Brown    


 


Urine Clarity Cloudy    


 


Urine pH 5.0 (<5.0-8.0)    


 


Urine Specific Gravity


 >=1.030


(1.000-1.030) 


 


 





 


Urine Protein


 100 mg/dL


(NEG-TRACE) 


 


 





 


Urine Glucose (UA)


 100 mg/dL


(NEG) 


 


 





 


Urine Ketones (Stick)


 Trace mg/dL


(NEG) 


 


 





 


Urine Blood Large (NEG)    


 


Urine Nitrite Positive (NEG)    


 


Urine Bilirubin Small (NEG)    


 


Urine Urobilinogen Dipstick


 1.0 mg/dL (0.2


mg/dL) 


 


 





 


Urine Leukocyte Esterase Small (NEG)    


 


Urine RBC >40 /HPF (0-2)    


 


Urine WBC 1-4 /HPF (0-4)    


 


Urine Squamous Epithelial


Cells Occ /LPF 


 


 


 





 


Urine Transitional Epithelial


Cells Few /LPF 


 


 


 





 


Urine Amorphous Sediment Present /HPF    


 


Urine Bacteria


 Few /HPF


(0-FEW) 


 


 





 


Urine Hyaline Casts Few /HPF    


 


Urine Mucus Slight /LPF    


 


Glucose (Fingerstick)


 


 223 mg/dL


(70-99) 166 mg/dL


(70-99) 201 mg/dL


(70-99)


 


Test


 4/13/20


06:09 4/13/20


06:10 4/13/20


14:11 4/13/20


15:00


 


Glucose (Fingerstick)


 175 mg/dL


(70-99) 


 151 mg/dL


(70-99) 





 


White Blood Count


 


 5.3 x10^3/uL


(4.0-11.0) 


 





 


Red Blood Count


 


 2.48 x10^6/uL


(3.50-5.40) 


 





 


Hemoglobin


 


 8.0 g/dL


(12.0-15.5) 


 





 


Hematocrit


 


 24.3 %


(36.0-47.0) 


 





 


Mean Corpuscular Volume  98 fL ()   


 


Mean Corpuscular Hemoglobin  32 pg (25-35)   


 


Mean Corpuscular Hemoglobin


Concent 


 33 g/dL


(31-37) 


 





 


Red Cell Distribution Width


 


 19.3 %


(11.5-14.5) 


 





 


Platelet Count


 


 318 x10^3/uL


(140-400) 


 





 


Neutrophils (%) (Auto)  76 % (31-73)   


 


Lymphocytes (%) (Auto)  10 % (24-48)   


 


Monocytes (%) (Auto)  12 % (0-9)   


 


Eosinophils (%) (Auto)  2 % (0-3)   


 


Basophils (%) (Auto)  0 % (0-3)   


 


Neutrophils # (Auto)


 


 4.0 x10^3/uL


(1.8-7.7) 


 





 


Lymphocytes # (Auto)


 


 0.5 x10^3/uL


(1.0-4.8) 


 





 


Monocytes # (Auto)


 


 0.6 x10^3/uL


(0.0-1.1) 


 





 


Eosinophils # (Auto)


 


 0.1 x10^3/uL


(0.0-0.7) 


 





 


Basophils # (Auto)


 


 0.0 x10^3/uL


(0.0-0.2) 


 





 


Sodium Level


 


 135 mmol/L


(136-145) 


 





 


Potassium Level


 


 3.6 mmol/L


(3.5-5.1) 


 





 


Chloride Level


 


 98 mmol/L


() 


 





 


Carbon Dioxide Level


 


 23 mmol/L


(21-32) 


 





 


Anion Gap  14 (6-14)   


 


Blood Urea Nitrogen


 


 80 mg/dL


(7-20) 


 





 


Creatinine


 


 2.1 mg/dL


(0.6-1.0) 


 





 


Estimated GFR


(Cockcroft-Gault) 


 25.0 


 


 





 


Glucose Level


 


 167 mg/dL


(70-99) 


 





 


Calcium Level


 


 9.0 mg/dL


(8.5-10.1) 


 





 


Magnesium Level


 


 2.2 mg/dL


(1.8-2.4) 


 





 


Triglycerides Level


 


 245 mg/dL


(0-150) 


 





 


O2 Saturation    94 % (92-99) 


 


Arterial Blood pH


 


 


 


 7.46


(7.35-7.45)


 


Arterial Blood pCO2 at


Patient Temp 


 


 


 38 mmHg


(35-46)


 


Arterial Blood pO2 at Patient


Temp 


 


 


 74 mmHg


()


 


Arterial Blood HCO3


 


 


 


 26 mmol/L


(21-28)


 


Arterial Blood Base Excess


 


 


 


 2 mmol/L


(-3-3)


 


FiO2    35 


 


Test


 4/13/20


18:26 4/14/20


01:07 4/14/20


06:39 





 


Glucose (Fingerstick)


 213 mg/dL


(70-99) 174 mg/dL


(70-99) 157 mg/dL


(70-99) 











Laboratory Tests








Test


 4/13/20


14:11 4/13/20


15:00 4/13/20


18:26 4/14/20


01:07


 


Glucose (Fingerstick)


 151 mg/dL


(70-99) 


 213 mg/dL


(70-99) 174 mg/dL


(70-99)


 


O2 Saturation  94 % (92-99)   


 


Arterial Blood pH


 


 7.46


(7.35-7.45) 


 





 


Arterial Blood pCO2 at


Patient Temp 


 38 mmHg


(35-46) 


 





 


Arterial Blood pO2 at Patient


Temp 


 74 mmHg


() 


 





 


Arterial Blood HCO3


 


 26 mmol/L


(21-28) 


 





 


Arterial Blood Base Excess


 


 2 mmol/L


(-3-3) 


 





 


FiO2  35   


 


Test


 4/14/20


06:39 


 


 





 


Glucose (Fingerstick)


 157 mg/dL


(70-99) 


 


 











Microbiology


4/10/20 Blood Culture - Preliminary, Resulted


          NO GROWTH AFTER 3 DAYS


4/4/20 Urine Culture - Final, Complete


         


4/4/20 Urine Culture Result 1 ([facility]) - Final, Complete


Medications





Current Medications


Sodium Chloride 1,000 ml @  1,000 mls/hr Q1H IV  Last administered on 3/16/20at 

03:00;  Start 3/16/20 at 03:00;  Stop 3/16/20 at 03:59;  Status DC


Ondansetron HCl (Zofran) 4 mg 1X  ONCE IVP  Last administered on 3/16/20at 

03:27;  Start 3/16/20 at 03:00;  Stop 3/16/20 at 03:01;  Status DC


Morphine Sulfate (Morphine Sulfate) 4 mg 1X  ONCE IV ;  Start 3/16/20 at 03:00; 

Stop 3/16/20 at 03:01;  Status Cancel


Ketorolac Tromethamine (Toradol 30mg Vial) 30 mg 1X  ONCE IV  Last administered 

on 3/16/20at 02:54;  Start 3/16/20 at 03:00;  Stop 3/16/20 at 03:01;  Status DC


Fentanyl Citrate (Fentanyl 2ml Vial) 25 mcg 1X  ONCE IVP  Last administered on 

3/16/20at 03:23;  Start 3/16/20 at 03:30;  Stop 3/16/20 at 03:31;  Status DC


Fentanyl Citrate (Fentanyl 2ml Vial) 100 mcg STK-MED ONCE .ROUTE ;  Start 

3/16/20 at 03:18;  Stop 3/16/20 at 03:18;  Status DC


Iohexol (Omnipaque 350 Mg/ml) 90 ml 1X  ONCE IV  Last administered on 3/16/20at 

03:25;  Start 3/16/20 at 03:30;  Stop 3/16/20 at 03:31;  Status DC


Info (CONTRAST GIVEN -- Rx MONITORING) 1 each PRN DAILY  PRN MC SEE COMMENTS;  

Start 3/16/20 at 03:30;  Stop 3/18/20 at 03:29;  Status DC


Hydromorphone HCl (Dilaudid) 0.5 mg 1X  ONCE IV  Last administered on 3/16/20at 

03:55;  Start 3/16/20 at 04:30;  Stop 3/16/20 at 04:32;  Status DC


Ondansetron HCl (Zofran) 4 mg PRN Q8HRS  PRN IV NAUSEA/VOMITING 1ST CHOICE;  

Start 3/16/20 at 05:00;  Stop 3/16/20 at 09:27;  Status DC


Morphine Sulfate (Morphine Sulfate) 2 mg PRN Q2HR  PRN IV SEVERE PAIN 7-10 Last 

administered on 3/17/20at 12:26;  Start 3/16/20 at 05:00;  Stop 3/17/20 at 

14:15;  Status DC


Sodium Chloride 1,000 ml @  125 mls/hr Q8H IV  Last administered on 3/16/20at 

20:56;  Start 3/16/20 at 05:00;  Stop 3/17/20 at 04:59;  Status DC


Hydromorphone HCl (Dilaudid) 0.5 mg PRN Q3HRS  PRN IV SEVERE PAIN 7-10 Last 

administered on 3/17/20at 10:06;  Start 3/16/20 at 05:00;  Stop 3/17/20 at 

12:01;  Status DC


Piperacillin Sod/ Tazobactam Sod 4.5 gm/Sodium Chloride 100 ml @  200 mls/hr 1X 

ONCE IV  Last administered on 3/16/20at 05:44;  Start 3/16/20 at 06:00;  Stop 

3/16/20 at 06:29;  Status DC


Ondansetron HCl (Zofran) 4 mg PRN Q4HRS  PRN IV NAUSEA/VOMITING 1ST CHOICE Last 

administered on 4/12/20at 09:56;  Start 3/16/20 at 09:30


Insulin Human Lispro (HumaLOG) 0-9 UNITS Q6HRS SQ  Last administered on 4/14/20

at 06:42;  Start 3/16/20 at 09:30


Dextrose (Dextrose 50%-Water Syringe) 12.5 gm PRN Q15MIN  PRN IV SEE COMMENTS;  

Start 3/16/20 at 09:30


Pantoprazole Sodium (PROTONIX VIAL for IV PUSH) 40 mg DAILYAC IVP  Last 

administered on 4/14/20at 08:01;  Start 3/16/20 at 11:30


Prochlorperazine Edisylate (Compazine) 10 mg PRN Q6HRS  PRN IV NAUSEA/VOMITING, 

2nd CHOICE Last administered on 3/17/20at 00:42;  Start 3/16/20 at 17:45


Atenolol (Tenormin) 100 mg DAILY PO ;  Start 3/17/20 at 09:00;  Stop 3/16/20 at 

20:08;  Status DC


Metoprolol Tartrate (Lopressor Vial) 2.5 mg Q6HRS IVP  Last administered on 

3/17/20at 05:51;  Start 3/16/20 at 20:15;  Stop 3/17/20 at 10:02;  Status DC


Metoprolol Tartrate (Lopressor Vial) 5 mg Q6HRS IVP  Last administered on 

3/26/20at 00:12;  Start 3/17/20 at 10:15;  Stop 3/28/20 at 08:48;  Status DC


Hydromorphone HCl (Dilaudid) 1 mg PRN Q3HRS  PRN IV SEVERE PAIN 7-10 Last a

dministered on 3/23/20at 05:13;  Start 3/17/20 at 12:00;  Stop 3/31/20 at 00:25;

 Status DC


Lidocaine HCl (Buffered Lidocaine 1%) 3 ml STK-MED ONCE .ROUTE ;  Start 3/17/20 

at 12:55;  Stop 3/17/20 at 12:56;  Status DC


Albumin Human 500 ml @  125 mls/hr 1X  ONCE IV  Last administered on 3/17/20at 

14:33;  Start 3/17/20 at 14:30;  Stop 3/17/20 at 18:32;  Status DC


Norepinephrine Bitartrate 8 mg/ Dextrose 258 ml @  17.299 mls/ hr CONT  PRN IV 

PER PROTOCOL Last administered on 4/10/20at 13:31;  Start 3/17/20 at 15:30


Sodium Chloride 1,000 ml @  125 mls/hr Q8H IV  Last administered on 3/17/20at 

21:04;  Start 3/17/20 at 16:00;  Stop 3/18/20 at 02:42;  Status DC


Albumin Human 500 ml @  125 mls/hr PRN BID  PRN IV After every 2L NSS & BP < 

90mm Last administered on 4/1/20at 14:21;  Start 3/17/20 at 16:00


Iohexol (Omnipaque 300 Mg/ml) 60 ml 1X  ONCE IV  Last administered on 3/17/20at 

17:20;  Start 3/17/20 at 17:00;  Stop 3/17/20 at 17:01;  Status DC


Info (CONTRAST GIVEN -- Rx MONITORING) 1 each PRN DAILY  PRN MC SEE COMMENTS;  

Start 3/17/20 at 17:00;  Stop 3/19/20 at 16:59;  Status DC


Meropenem 1 gm/ Sodium Chloride 100 ml @  200 mls/hr Q8HRS IV  Last administered

on 3/18/20at 05:45;  Start 3/17/20 at 20:00;  Stop 3/18/20 at 08:48;  Status DC


Furosemide (Lasix) 40 mg 1X  ONCE IVP  Last administered on 3/17/20at 22:12;  

Start 3/17/20 at 22:30;  Stop 3/17/20 at 22:31;  Status DC


Calcium Chloride 1000 mg/Sodium Chloride 110 ml @  220 mls/hr 1X  ONCE IV  Last 

administered on 3/17/20at 22:11;  Start 3/17/20 at 22:30;  Stop 3/17/20 at 

22:59;  Status DC


Albuterol Sulfate (Ventolin Neb Soln) 2.5 mg 1X  ONCE NEB  Last administered on 

3/18/20at 00:56;  Start 3/17/20 at 22:30;  Stop 3/17/20 at 22:31;  Status DC


Insulin Human Regular (HumuLIN R VIAL) 5 unit 1X  ONCE IV  Last administered on 

3/17/20at 22:14;  Start 3/17/20 at 22:30;  Stop 3/17/20 at 22:31;  Status DC


Magnesium Sulfate 50 ml @ 25 mls/hr 1X  ONCE IV  Last administered on 3/18/20at 

02:57;  Start 3/18/20 at 03:00;  Stop 3/18/20 at 04:59;  Status DC


Calcium Gluconate 1000 mg/Sodium Chloride 110 ml @  220 mls/hr 1X  ONCE IV  Last

administered on 3/18/20at 02:46;  Start 3/18/20 at 03:00;  Stop 3/18/20 at 

03:29;  Status DC


Sodium Chloride 1,000 ml @  200 mls/hr Q5H IV  Last administered on 3/18/20at 

02:46;  Start 3/18/20 at 03:00;  Stop 3/18/20 at 10:21;  Status DC


Calcium Gluconate 1000 mg/Sodium Chloride 110 ml @  220 mls/hr 1X  ONCE IV  Last

administered on 3/18/20at 03:21;  Start 3/18/20 at 03:30;  Stop 3/18/20 at 

03:59;  Status DC


Sodium Bicarbonate 50 meq/Sodium Chloride 1,050 ml @  75 mls/hr Q14H IV  Last 

administered on 3/22/20at 21:10;  Start 3/18/20 at 07:30;  Stop 3/23/20 at 

10:28;  Status DC


Calcium Gluconate 2000 mg/Sodium Chloride 120 ml @  220 mls/hr 1X  ONCE IV  Last

administered on 3/18/20at 09:05;  Start 3/18/20 at 07:30;  Stop 3/18/20 at 

08:02;  Status DC


Lidocaine HCl (Xylocaine-Mpf 1% 2ml Vial) 2 ml STK-MED ONCE .ROUTE ;  Start 

3/18/20 at 08:47;  Stop 3/18/20 at 08:47;  Status DC


Meropenem 500 mg/ Sodium Chloride 50 ml @  100 mls/hr Q12HR IV  Last 

administered on 3/23/20at 21:01;  Start 3/18/20 at 18:00;  Stop 3/24/20 at 

07:58;  Status DC


Lidocaine HCl (Buffered Lidocaine 1%) 3 ml STK-MED ONCE .ROUTE ;  Start 3/18/20 

at 09:46;  Stop 3/18/20 at 09:46;  Status DC


Lidocaine HCl (Buffered Lidocaine 1%) 6 ml 1X  ONCE INJ  Last administered on 

3/18/20at 10:26;  Start 3/18/20 at 10:15;  Stop 3/18/20 at 10:16;  Status DC


Info (Tpn Per Pharmacy) 1 each PRN DAILY  PRN MC SEE COMMENTS Last administered 

on 4/13/20at 13:34;  Start 3/18/20 at 12:00


Sodium Chloride 1,000 ml @  1,000 mls/hr Q1H PRN IV hypotension;  Start 3/18/20 

at 12:07;  Stop 3/18/20 at 18:06;  Status DC


Diphenhydramine HCl (Benadryl) 25 mg 1X PRN  PRN IV ITCHING;  Start 3/18/20 at 

12:15;  Stop 3/19/20 at 12:14;  Status DC


Diphenhydramine HCl (Benadryl) 25 mg 1X PRN  PRN IV ITCHING;  Start 3/18/20 at 

12:15;  Stop 3/19/20 at 12:14;  Status DC


Sodium Chloride 1,000 ml @  400 mls/hr Q2H30M PRN IV PATENCY;  Start 3/18/20 at 

12:07;  Stop 3/19/20 at 00:06;  Status DC


Info (PHARMACY MONITORING -- do not chart) 1 each PRN DAILY  PRN MC SEE 

COMMENTS;  Start 3/18/20 at 12:15;  Stop 3/20/20 at 08:13;  Status DC


Sodium Chloride 90 meq/Calcium Gluconate 10 meq/ Multivitamins 10 ml/Chromium/ 

Copper/Manganese/ Seleni/Zn 1 ml/ Total Parenteral Nutrition/Amino 

Acids/Dextrose/ Fat Emulsion Intravenous 55.005 ml  @ 2.292 mls/hr TPN  CONT IV 

;  Start 3/18/20 at 22:00;  Stop 3/18/20 at 12:33;  Status DC


Info (Tpn Per Pharmacy) 1 each PRN DAILY  PRN MC SEE COMMENTS;  Start 3/18/20 at

12:30;  Status UNV


Sodium Chloride 90 meq/Calcium Gluconate 10 meq/ Multivitamins 10 ml/Chromium/ 

Copper/Manganese/ Seleni/Zn 0.5 ml/ Total Parenteral Nutrition/Amino 

Acids/Dextrose/ Fat Emulsion Intravenous 1,512 ml @  63 mls/hr TPN  CONT IV  

Last administered on 3/18/20at 22:06;  Start 3/18/20 at 22:00;  Stop 3/19/20 at 

21:59;  Status DC


Calcium Carbonate/ Glycine (Tums) 500 mg PRN AFTMEALHC  PRN PO INDIGESTION;  

Start 3/18/20 at 17:45


Calcium Gluconate (Calcium Gluconate) 2,000 mg 1X  ONCE IVP  Last administered 

on 3/19/20at 02:19;  Start 3/19/20 at 02:15;  Stop 3/19/20 at 02:16;  Status DC


Calcium Chloride 3000 mg/Sodium Chloride 1,030 ml @  50 mls/hr [id number] IV  Last 

administered on 3/21/20at 02:17;  Start 3/19/20 at 08:00;  Stop 3/21/20 at 

15:23;  Status DC


Lorazepam (Ativan Inj) 1 mg PRN Q4HRS  PRN IVP ANXIETY / AGITATION Last 

administered on 3/23/20at 00:34;  Start 3/19/20 at 09:00


Sodium Chloride 1,000 ml @  1,000 mls/hr Q1H PRN IV hypotension;  Start 3/19/20 

at 08:56;  Stop 3/19/20 at 14:55;  Status DC


Albumin Human 200 ml @  200 mls/hr 1X PRN  PRN IV Hypotension;  Start 3/19/20 at

09:00;  Stop 3/19/20 at 14:59;  Status DC


Diphenhydramine HCl (Benadryl) 25 mg 1X PRN  PRN IV ITCHING;  Start 3/19/20 at 

09:00;  Stop 3/20/20 at 08:59;  Status DC


Diphenhydramine HCl (Benadryl) 25 mg 1X PRN  PRN IV ITCHING;  Start 3/19/20 at 

09:00;  Stop 3/20/20 at 08:59;  Status DC


Sodium Chloride 1,000 ml @  400 mls/hr Q2H30M PRN IV PATENCY;  Start 3/19/20 at 

08:56;  Stop 3/19/20 at 20:55;  Status DC


Info (PHARMACY MONITORING -- do not chart) 1 each PRN DAILY  PRN MC SEE CO

MMENTS;  Start 3/19/20 at 09:00;  Status UNV


Info (PHARMACY MONITORING -- do not chart) 1 each PRN DAILY  PRN MC SEE 

COMMENTS;  Start 3/19/20 at 09:00;  Stop 3/20/20 at 08:13;  Status DC


Digoxin (Lanoxin) 500 mcg 1X  ONCE IV  Last administered on 3/19/20at 10:04;  

Start 3/19/20 at 10:00;  Stop 3/19/20 at 10:01;  Status DC


Digoxin (Lanoxin) 125 mcg 1X  ONCE IV  Last administered on 3/19/20at 17:10;  

Start 3/19/20 at 18:00;  Stop 3/19/20 at 18:01;  Status DC


Magnesium Sulfate 100 ml @  25 mls/hr 1X  ONCE IV  Last administered on 

3/19/20at 12:48;  Start 3/19/20 at 13:00;  Stop 3/19/20 at 16:59;  Status DC


Sodium Chloride 90 meq/Magnesium Sulfate 10 meq/ Calcium Gluconate 20 meq/ 

Multivitamins 10 ml/Chromium/ Copper/Manganese/ Seleni/Zn 0.5 ml/ Total 

Parenteral Nutrition/Amino Acids/Dextrose/ Fat Emulsion Intravenous 1,512 ml @  

63 mls/hr TPN  CONT IV  Last administered on 3/19/20at 22:25;  Start 3/19/20 at 

22:00;  Stop 3/20/20 at 21:59;  Status DC


Sodium Chloride 1,000 ml @  1,000 mls/hr Q1H PRN IV hypotension;  Start 3/20/20 

at 08:05;  Stop 3/20/20 at 14:04;  Status DC


Albumin Human 200 ml @  200 mls/hr 1X  ONCE IV  Last administered on 3/20/20at 

08:57;  Start 3/20/20 at 08:15;  Stop 3/20/20 at 09:14;  Status DC


Diphenhydramine HCl (Benadryl) 25 mg 1X PRN  PRN IV ITCHING;  Start 3/20/20 at 

08:15;  Stop 3/21/20 at 08:14;  Status DC


Diphenhydramine HCl (Benadryl) 25 mg 1X PRN  PRN IV ITCHING;  Start 3/20/20 at 

08:15;  Stop 3/21/20 at 08:14;  Status DC


Sodium Chloride 1,000 ml @  400 mls/hr Q2H30M PRN IV PATENCY;  Start 3/20/20 at 

08:05;  Stop 3/20/20 at 20:04;  Status DC


Info (PHARMACY MONITORING -- do not chart) 1 each PRN DAILY  PRN MC SEE 

COMMENTS;  Start 3/20/20 at 08:15;  Stop 3/24/20 at 07:57;  Status DC


Sodium Chloride 90 meq/Potassium Chloride 15 meq/ Potassium Phosphate 10 mmol/ 

Magnesium Sulfate 10 meq/Calcium Gluconate 20 meq/ Multivitamins 10 ml/Chromium/

Copper/Manganese/ Seleni/Zn 0.5 ml/ Total Parenteral Nutrition/Amino 

Acids/Dextrose/ Fat Emulsion Intravenous 1,512 ml @  63 mls/hr TPN  CONT IV  

Last administered on 3/20/20at 21:01;  Start 3/20/20 at 22:00;  Stop 3/21/20 at 

21:59;  Status DC


Potassium Chloride/Water 100 ml @  100 mls/hr 1X  ONCE IV  Last administered on 

3/20/20at 14:09;  Start 3/20/20 at 14:00;  Stop 3/20/20 at 14:59;  Status DC


Benzocaine (Hurricaine One) 1 spray 1X  ONCE MM  Last administered on 3/20/20at 

16:38;  Start 3/20/20 at 14:30;  Stop 3/20/20 at 14:31;  Status DC


Lidocaine HCl (Glydo (Lidocaine) Jelly) 1 thomas 1X  ONCE MM  Last administered on 

3/20/20at 16:38;  Start 3/20/20 at 14:30;  Stop 3/20/20 at 14:31;  Status DC


Linezolid/Dextrose 300 ml @  300 mls/hr Q12HR IV  Last administered on 3/26/20at

21:04;  Start 3/20/20 at 20:00;  Stop 3/27/20 at 07:50;  Status DC


Acetaminophen (Tylenol) 650 mg PRN Q6HRS  PRN PO MILD PAIN / TEMP;  Start 

3/21/20 at 03:30;  Stop 3/21/20 at 03:36;  Status DC


Acetaminophen (Tylenol) 650 mg PRN Q6HRS  PRN PEG MILD PAIN / TEMP Last 

administered on 3/26/20at 07:35;  Start 3/21/20 at 03:36


Sodium Chloride 1,000 ml @  1,000 mls/hr Q1H PRN IV hypotension;  Start 3/21/20 

at 07:50;  Stop 3/21/20 at 13:49;  Status DC


Albumin Human 200 ml @  200 mls/hr 1X PRN  PRN IV Hypotension;  Start 3/21/20 at

08:00;  Stop 3/21/20 at 13:59;  Status DC


Sodium Chloride (Normal Saline Flush) 10 ml 1X PRN  PRN IV AP catheter pack;  

Start 3/21/20 at 08:00;  Stop 3/22/20 at 07:59;  Status DC


Sodium Chloride (Normal Saline Flush) 10 ml 1X PRN  PRN IV  catheter pack;  

Start 3/21/20 at 08:00;  Stop 3/22/20 at 07:59;  Status DC


Sodium Chloride 1,000 ml @  400 mls/hr Q2H30M PRN IV PATENCY;  Start 3/21/20 at 

07:50;  Stop 3/21/20 at 19:49;  Status DC


Info (PHARMACY MONITORING -- do not chart) 1 each PRN DAILY  PRN MC SEE 

COMMENTS;  Start 3/21/20 at 08:00;  Status UNV


Info (PHARMACY MONITORING -- do not chart) 1 each PRN DAILY  PRN MC SEE 

COMMENTS;  Start 3/21/20 at 08:00;  Stop 3/23/20 at 08:25;  Status DC


Sodium Chloride 90 meq/Potassium Chloride 15 meq/ Potassium Phosphate 10 mmol/ 

Magnesium Sulfate 10 meq/Calcium Gluconate 20 meq/ Multivitamins 10 ml/Chromium/

Copper/Manganese/ Seleni/Zn 0.5 ml/ Total Parenteral Nutrition/Amino 

Acids/Dextrose/ Fat Emulsion Intravenous 1,512 ml @  63 mls/hr TPN  CONT IV  

Last administered on 3/21/20at 20:57;  Start 3/21/20 at 22:00;  Stop 3/22/20 at 

21:59;  Status DC


Sodium Chloride 90 meq/Potassium Chloride 15 meq/ Potassium Phosphate 15 mmol/ 

Magnesium Sulfate 10 meq/Calcium Gluconate 20 meq/ Multivitamins 10 ml/Chromium/

Copper/Manganese/ Seleni/Zn 0.5 ml/ Total Parenteral Nutrition/Amino 

Acids/Dextrose/ Fat Emulsion Intravenous 1,512 ml @  63 mls/hr TPN  CONT IV ;  

Start 3/22/20 at 22:00;  Stop 3/22/20 at 14:16;  Status DC


Sodium Chloride 90 meq/Potassium Chloride 15 meq/ Potassium Phosphate 15 mmol/ 

Magnesium Sulfate 10 meq/Calcium Gluconate 20 meq/ Multivitamins 10 ml/Chromium/

Copper/Manganese/ Seleni/Zn 0.5 ml/ Total Parenteral Nutrition/Amino 

Acids/Dextrose/ Fat Emulsion Intravenous 1,200 ml @  50 mls/hr TPN  CONT IV ;  

Start 3/22/20 at 22:00;  Stop 3/22/20 at 14:17;  Status DC


Sodium Chloride 90 meq/Potassium Chloride 15 meq/ Potassium Phosphate 10 mmol/ 

Magnesium Sulfate 10 meq/Calcium Gluconate 20 meq/ Multivitamins 10 ml/Chromium/

Copper/Manganese/ Seleni/Zn 0.5 ml/ Total Parenteral Nutrition/Amino 

Acids/Dextrose/ Fat Emulsion Intravenous 1,200 ml @  50 mls/hr TPN  CONT IV  

Last administered on 3/22/20at 23:29;  Start 3/22/20 at 22:00;  Stop 3/23/20 at 

21:59;  Status DC


Sodium Chloride 1,000 ml @  1,000 mls/hr Q1H PRN IV hypotension;  Start 3/23/20 

at 07:28;  Stop 3/23/20 at 13:27;  Status DC


Albumin Human 200 ml @  200 mls/hr 1X  ONCE IV  Last administered on 3/23/20at 

08:51;  Start 3/23/20 at 07:30;  Stop 3/23/20 at 08:29;  Status DC


Diphenhydramine HCl (Benadryl) 25 mg 1X PRN  PRN IV ITCHING;  Start 3/23/20 at 

07:30;  Stop 3/24/20 at 07:29;  Status DC


Diphenhydramine HCl (Benadryl) 25 mg 1X PRN  PRN IV ITCHING;  Start 3/23/20 at 

07:30;  Stop 3/24/20 at 07:29;  Status DC


Sodium Chloride 1,000 ml @  400 mls/hr Q2H30M PRN IV PATENCY;  Start 3/23/20 at 

07:28;  Stop 3/23/20 at 19:27;  Status DC


Info (PHARMACY MONITORING -- do not chart) 1 each PRN DAILY  PRN MC SEE 

COMMENTS;  Start 3/23/20 at 07:30;  Stop 4/3/20 at 13:01;  Status DC


Metronidazole 100 ml @  100 mls/hr Q6HRS IV  Last administered on 4/8/20at 

06:26;  Start 3/23/20 at 08:30;  Stop 4/8/20 at 09:58;  Status DC


Micafungin Sodium 100 mg/Dextrose 100 ml @  100 mls/hr Q24H IV  Last 

administered on 4/13/20at 14:06;  Start 3/23/20 at 09:00


Propofol 0 ml @ As Directed STK-MED ONCE IV ;  Start 3/23/20 at 07:53;  Stop 

3/23/20 at 07:53;  Status DC


Etomidate (Amidate) 20 mg STK-MED ONCE IV ;  Start 3/23/20 at 07:53;  Stop 

3/23/20 at 07:54;  Status DC


Midazolam HCl (Versed) 5 mg STK-MED ONCE .ROUTE ;  Start 3/23/20 at 07:57;  Stop

3/23/20 at 07:57;  Status DC


Fentanyl Citrate 30 ml @ 0 mls/hr CONT  PRN IV SEE PROTOCOL Last administered on

4/14/20at 08:03;  Start 3/23/20 at 08:15


Artificial Tears (Artificial Tears) 1 drop PRN Q1HR  PRN OU DRY EYE, 1st choice;

 Start 3/23/20 at 08:15


Midazolam HCl 50 mg/Sodium Chloride 50 ml @ 0 mls/hr CONT  PRN IV SEE PROTOCOL 

Last administered on 3/26/20at 22:39;  Start 3/23/20 at 08:15;  Stop 3/28/20 at 

15:59;  Status DC


Etomidate (Amidate) 8 mg 1X  ONCE IV  Last administered on 3/23/20at 08:33;  

Start 3/23/20 at 08:30;  Stop 3/23/20 at 08:31;  Status DC


Succinylcholine Chloride (Anectine) 120 mg 1X  ONCE IV  Last administered on 

3/23/20at 08:34;  Start 3/23/20 at 08:30;  Stop 3/23/20 at 08:31;  Status DC


Midazolam HCl (Versed) 5 mg 1X  ONCE IV ;  Start 3/23/20 at 08:30;  Stop 3/23/20

at 08:31;  Status DC


Potassium Chloride 15 meq/ Bicarbonate Dialysis Soln w/ out KCl 5,007.5 ml  @ 

1,000 mls/ hr Q5H1M IV  Last administered on 3/24/20at 11:11;  Start 3/23/20 at 

12:00;  Stop 3/24/20 at 11:15;  Status DC


Potassium Chloride 15 meq/ Bicarbonate Dialysis Soln w/ out KCl 5,007.5 ml  @ 

1,000 mls/ hr Q5H1M IV  Last administered on 3/24/20at 11:12;  Start 3/23/20 at 

12:00;  Stop 3/24/20 at 11:17;  Status DC


Potassium Chloride 15 meq/ Bicarbonate Dialysis Soln w/ out KCl 5,007.5 ml  @ 

1,000 mls/ hr Q5H1M IV  Last administered on 3/24/20at 11:11;  Start 3/23/20 at 

12:00;  Stop 3/24/20 at 11:19;  Status DC


Sodium Chloride 90 meq/Potassium Chloride 15 meq/ Potassium Phosphate 10 mmol/ 

Magnesium Sulfate 10 meq/Calcium Gluconate 20 meq/ Multivitamins 10 ml/Chromium/

Copper/Manganese/ Seleni/Zn 0.5 ml/ Total Parenteral Nutrition/Amino Acids/De

xtrose/ Fat Emulsion Intravenous 1,400 ml @  58.333 mls/ hr TPN  CONT IV  Last 

administered on 3/23/20at 21:42;  Start 3/23/20 at 22:00;  Stop 3/24/20 at 

21:59;  Status DC


Heparin Sodium (Porcine) (Heparin Sodium) 5,000 unit Q8HRS SQ  Last administered

on 3/28/20at 05:55;  Start 3/23/20 at 15:00;  Stop 3/28/20 at 13:28;  Status DC


Meropenem 500 mg/ Sodium Chloride 50 ml @  100 mls/hr Q6HRS IV  Last 

administered on 3/25/20at 06:00;  Start 3/24/20 at 09:00;  Stop 3/25/20 at 

07:29;  Status DC


Potassium Phosphate 20 mmol/ Sodium Chloride 106.6667 ml @  51.667 m... 1X  ONCE

IV  Last administered on 3/24/20at 11:22;  Start 3/24/20 at 10:15;  Stop 3/24/20

at 12:18;  Status DC


Acetaminophen (Tylenol Supp) 650 mg PRN Q6HRS  PRN OR MILD PAIN / TEMP Last 

administered on 4/14/20at 08:01;  Start 3/24/20 at 10:30


Potassium Chloride/Water 100 ml @  100 mls/hr Q1H IV  Last administered on 

3/24/20at 12:12;  Start 3/24/20 at 11:00;  Stop 3/24/20 at 12:59;  Status DC


Potassium Chloride 20 meq/ Bicarbonate Dialysis Soln w/ out KCl 5,010 ml @  

1,000 mls/hr Q5H1M IV  Last administered on 3/25/20at 08:48;  Start 3/24/20 at 

12:00;  Stop 3/25/20 at 13:03;  Status DC


Potassium Chloride 20 meq/ Bicarbonate Dialysis Soln w/ out KCl 5,010 ml @  

1,000 mls/hr Q5H1M IV  Last administered on 3/29/20at 14:52;  Start 3/24/20 at 

11:30;  Stop 3/29/20 at 19:59;  Status DC


Potassium Chloride 20 meq/ Bicarbonate Dialysis Soln w/ out KCl 5,010 ml @  

1,000 mls/hr Q5H1M IV  Last administered on 3/29/20at 14:53;  Start 3/24/20 at 

11:30;  Stop 3/29/20 at 19:59;  Status DC


Sodium Chloride 90 meq/Potassium Chloride 15 meq/ Potassium Phosphate 15 mmol/ 

Magnesium Sulfate 10 meq/Calcium Gluconate 15 meq/ Multivitamins 10 ml/Chromium/

Copper/Manganese/ Seleni/Zn 0.5 ml/ Total Parenteral Nutrition/Amino Acids/Dex

trose/ Fat Emulsion Intravenous 1,400 ml @  58.333 mls/ hr TPN  CONT IV  Last 

administered on 3/24/20at 22:17;  Start 3/24/20 at 22:00;  Stop 3/25/20 at 

21:59;  Status DC


Cefepime HCl (Maxipime) 2 gm Q12HR IVP  Last administered on 4/7/20at 20:56;  

Start 3/25/20 at 09:00;  Stop 4/8/20 at 09:58;  Status DC


Daptomycin 500 mg/ Sodium Chloride 50 ml @  100 mls/hr Q48H IV  Last administ

ered on 4/10/20at 09:57;  Start 3/25/20 at 08:30;  Stop 4/10/20 at 10:07;  

Status DC


Lidocaine HCl (Buffered Lidocaine 1%) 3 ml 1X  ONCE INJ  Last administered on 

3/25/20at 10:27;  Start 3/25/20 at 10:30;  Stop 3/25/20 at 10:31;  Status DC


Potassium Phosphate 20 mmol/ Sodium Chloride 106.6667 ml @  51.667 m... 1X  ONCE

IV  Last administered on 3/25/20at 12:51;  Start 3/25/20 at 13:00;  Stop 3/25/20

at 15:03;  Status DC


Sodium Chloride 90 meq/Potassium Chloride 15 meq/ Potassium Phosphate 18 mmol/ 

Magnesium Sulfate 8 meq/Calcium Gluconate 15 meq/ Multivitamins 10 ml/Chromium/ 

Copper/Manganese/ Seleni/Zn 0.5 ml/ Total Parenteral Nutrition/Amino 

Acids/Dextrose/ Fat Emulsion Intravenous 1,400 ml @  58.333 mls/ hr TPN  CONT IV

 Last administered on 3/25/20at 22:16;  Start 3/25/20 at 22:00;  Stop 3/26/20 at

21:59;  Status DC


Potassium Chloride 20 meq/ Bicarbonate Dialysis Soln w/ out KCl 5,010 ml @  

1,000 mls/hr Q5H1M IV  Last administered on 3/29/20at 14:54;  Start 3/25/20 at 

16:00;  Stop 3/29/20 at 19:59;  Status DC


Multi-Ingred Cream/Lotion/Oil/ Oint (Artificial Tears Eye Ointment) 1 thomas PRN 

Q1HR  PRN OU DRY EYE, 2nd choice Last administered on 4/13/20at 08:19;  Start 

3/25/20 at 17:30


Sodium Chloride 90 meq/Potassium Chloride 15 meq/ Potassium Phosphate 18 mmol/ 

Magnesium Sulfate 8 meq/Calcium Gluconate 15 meq/ Multivitamins 10 ml/Chromium/ 

Copper/Manganese/ Seleni/Zn 0.5 ml/ Total Parenteral Nutrition/Amino 

Acids/Dextrose/ Fat Emulsion Intravenous 1,400 ml @  58.333 mls/ hr TPN  CONT IV

 Last administered on 3/26/20at 22:00;  Start 3/26/20 at 22:00;  Stop 3/27/20 at

21:59;  Status DC


Albumin Human 500 ml @  125 mls/hr 1X  ONCE IV ;  Start 3/26/20 at 14:15;  Stop 

3/26/20 at 18:14;  Status DC


Sodium Chloride 90 meq/Potassium Chloride 15 meq/ Potassium Phosphate 18 mmol/ 

Magnesium Sulfate 8 meq/Calcium Gluconate 15 meq/ Multivitamins 10 ml/Chromium/ 

Copper/Manganese/ Seleni/Zn 0.5 ml/ Insulin Human Regular 10 unit/ Total 

Parenteral Nutrition/Amino Acids/Dextrose/ Fat Emulsion Intravenous 1,400 ml @  

58.333 mls/ hr TPN  CONT IV  Last administered on 3/27/20at 21:43;  Start 

3/27/20 at 22:00;  Stop 3/28/20 at 21:59;  Status DC


Lidocaine HCl (Buffered Lidocaine 1%) 3 ml STK-MED ONCE .ROUTE ;  Start 3/25/20 

at 10:00;  Stop 3/27/20 at 13:57;  Status DC


Midazolam HCl 100 mg/Sodium Chloride 100 ml @ 7 mls/hr CONT  PRN IV SEE PROTOCOL

Last administered on 4/8/20at 15:35;  Start 3/28/20 at 16:00


Sodium Chloride 90 meq/Potassium Chloride 15 meq/ Potassium Phosphate 18 mmol/ 

Magnesium Sulfate 8 meq/Calcium Gluconate 15 meq/ Multivitamins 10 ml/Chromium/ 

Copper/Manganese/ Seleni/Zn 0.5 ml/ Insulin Human Regular 15 unit/ Total 

Parenteral Nutrition/Amino Acids/Dextrose/ Fat Emulsion Intravenous 1,400 ml @  

58.333 mls/ hr TPN  CONT IV  Last administered on 3/28/20at 20:34;  Start 3/28/2

0 at 22:00;  Stop 3/29/20 at 21:59;  Status DC


Info ([facility] Electrolyte Protocol) 1 ea CONT PRN  PRN MC PER PROTOCOL;  Start 

3/29/20 at 13:15


Sodium Chloride 90 meq/Potassium Chloride 15 meq/ Potassium Phosphate 18 mmol/ 

Magnesium Sulfate 8 meq/Calcium Gluconate 15 meq/ Multivitamins 10 ml/Chromium/ 

Copper/Manganese/ Seleni/Zn 0.5 ml/ Insulin Human Regular 15 unit/ Total 

Parenteral Nutrition/Amino Acids/Dextrose/ Fat Emulsion Intravenous 1,400 ml @  

58.333 mls/ hr TPN  CONT IV  Last administered on 3/29/20at 22:05;  Start 

3/29/20 at 22:00;  Stop 3/30/20 at 21:59;  Status DC


Potassium Chloride 15 meq/ Bicarbonate Dialysis Soln w/ out KCl 5,007.5 ml  @ 

1,000 mls/ hr Q5H1M IV  Last administered on 4/1/20at 18:14;  Start 3/29/20 at 

20:00;  Stop 4/2/20 at 13:08;  Status DC


Potassium Chloride 15 meq/ Bicarbonate Dialysis Soln w/ out KCl 5,007.5 ml  @ 

1,000 mls/ hr Q5H1M IV  Last administered on 4/1/20at 18:14;  Start 3/29/20 at 

20:00;  Stop 4/2/20 at 13:08;  Status DC


Potassium Chloride 15 meq/ Bicarbonate Dialysis Soln w/ out KCl 5,007.5 ml  @ 

1,000 mls/ hr Q5H1M IV  Last administered on 4/1/20at 18:14;  Start 3/29/20 at 

20:00;  Stop 4/2/20 at 13:08;  Status DC


Iohexol (Omnipaque 240 Mg/ml) 30 ml 1X  ONCE PO  Last administered on 3/30/20at 

11:30;  Start 3/30/20 at 11:30;  Stop 3/30/20 at 11:33;  Status DC


Info (CONTRAST GIVEN -- Rx MONITORING) 1 each PRN DAILY  PRN MC SEE COMMENTS;  

Start 3/30/20 at 11:45;  Stop 4/1/20 at 11:44;  Status DC


Sodium Chloride 90 meq/Potassium Chloride 15 meq/ Potassium Phosphate 18 mmol/ M

agnesium Sulfate 8 meq/Calcium Gluconate 15 meq/ Multivitamins 10 ml/Chromium/ 

Copper/Manganese/ Seleni/Zn 0.5 ml/ Insulin Human Regular 15 unit/ Total 

Parenteral Nutrition/Amino Acids/Dextrose/ Fat Emulsion Intravenous 1,400 ml @  

58.333 mls/ hr TPN  CONT IV  Last administered on 3/30/20at 21:47;  Start 

3/30/20 at 22:00;  Stop 3/31/20 at 21:59;  Status DC


Sodium Chloride 90 meq/Potassium Chloride 15 meq/ Potassium Phosphate 18 mmol/ 

Magnesium Sulfate 8 meq/Calcium Gluconate 15 meq/ Multivitamins 10 ml/Chromium/ 

Copper/Manganese/ Seleni/Zn 0.5 ml/ Insulin Human Regular 20 unit/ Total 

Parenteral Nutrition/Amino Acids/Dextrose/ Fat Emulsion Intravenous 1,400 ml @  

58.333 mls/ hr TPN  CONT IV  Last administered on 3/31/20at 21:36;  Start 

3/31/20 at 22:00;  Stop 4/1/20 at 21:59;  Status DC


Alteplase, Recombinant (Cathflo For Central Catheter Clearance) 1 mg 1X  ONCE 

INT CAT  Last administered on 3/31/20at 20:03;  Start 3/31/20 at 19:30;  Stop 

3/31/20 at 19:46;  Status DC


Alteplase, Recombinant (Cathflo For Central Catheter Clearance) 1 mg 1X  ONCE 

INT CAT  Last administered on 3/31/20at 22:05;  Start 3/31/20 at 22:00;  Stop 

3/31/20 at 22:01;  Status DC


Sodium Chloride 90 meq/Potassium Chloride 15 meq/ Potassium Phosphate 18 mmol/ 

Magnesium Sulfate 8 meq/Calcium Gluconate 15 meq/ Multivitamins 10 ml/Chromium/ 

Copper/Manganese/ Seleni/Zn 0.5 ml/ Insulin Human Regular 20 unit/ Total 

Parenteral Nutrition/Amino Acids/Dextrose/ Fat Emulsion Intravenous 1,400 ml @  

58.333 mls/ hr TPN  CONT IV  Last administered on 4/1/20at 21:30;  Start 4/1/20 

at 22:00;  Stop 4/2/20 at 21:59;  Status DC


Dexmedetomidine HCl 400 mcg/ Sodium Chloride 100 ml @ 0 mls/hr CONT  PRN IV 

ANXIETY / AGITATION Last administered on 4/14/20at 05:07;  Start 4/2/20 at 08:15


Sodium Chloride 500 ml @  500 mls/hr 1X PRN  PRN IV ELEVATED BP, SEE COMMENTS;  

Start 4/2/20 at 08:15


Atropine Sulfate (ATROPINE 0.5mg SYRINGE) 0.5 mg PRN Q5MIN  PRN IV SEE COMMENTS;

 Start 4/2/20 at 08:15


Furosemide (Lasix) 20 mg 1X  ONCE IVP  Last administered on 4/2/20at 08:19;  

Start 4/2/20 at 08:15;  Stop 4/2/20 at 08:16;  Status DC


Lidocaine HCl (Buffered Lidocaine 1%) 3 ml STK-MED ONCE .ROUTE ;  Start 4/2/20 

at 08:39;  Stop 4/2/20 at 08:39;  Status DC


Lidocaine HCl (Buffered Lidocaine 1%) 6 ml 1X  ONCE INJ  Last administered on 

4/2/20at 09:05;  Start 4/2/20 at 09:00;  Stop 4/2/20 at 09:06;  Status DC


Sodium Chloride 90 meq/Potassium Chloride 15 meq/ Potassium Phosphate 18 mmol/ 

Magnesium Sulfate 8 meq/Calcium Gluconate 15 meq/ Multivitamins 10 ml/Chromium/ 

Copper/Manganese/ Seleni/Zn 0.5 ml/ Insulin Human Regular 20 unit/ Total 

Parenteral Nutrition/Amino Acids/Dextrose/ Fat Emulsion Intravenous 1,400 ml @  

58.333 mls/ hr TPN  CONT IV  Last administered on 4/2/20at 22:45;  Start 4/2/20 

at 22:00;  Stop 4/3/20 at 21:59;  Status DC


Sodium Chloride 1,000 ml @  1,000 mls/hr Q1H PRN IV hypotension;  Start 4/3/20 

at 07:30;  Stop 4/3/20 at 13:29;  Status DC


Albumin Human 200 ml @  200 mls/hr 1X PRN  PRN IV Hypotension Last administered 

on 4/3/20at 09:36;  Start 4/3/20 at 07:30;  Stop 4/3/20 at 13:29;  Status DC


Sodium Chloride (Normal Saline Flush) 10 ml 1X PRN  PRN IV AP catheter pack;  

Start 4/3/20 at 07:30;  Stop 4/3/20 at 21:29;  Status DC


Sodium Chloride (Normal Saline Flush) 10 ml 1X PRN  PRN IV  catheter pack;  

Start 4/3/20 at 07:30;  Stop 4/4/20 at 07:29;  Status DC


Sodium Chloride 1,000 ml @  400 mls/hr Q2H30M PRN IV PATENCY;  Start 4/3/20 at 

07:30;  Stop 4/3/20 at 19:29;  Status DC


Info (PHARMACY MONITORING -- do not chart) 1 each PRN DAILY  PRN MC SEE 

COMMENTS;  Start 4/3/20 at 07:30;  Stop 4/3/20 at 13:02;  Status DC


Info (PHARMACY MONITORING -- do not chart) 1 each PRN DAILY  PRN MC SEE 

COMMENTS;  Start 4/3/20 at 07:30;  Stop 4/5/20 at 12:45;  Status DC


Sodium Chloride 90 meq/Potassium Chloride 15 meq/ Potassium Phosphate 10 mmol/ 

Magnesium Sulfate 8 meq/Calcium Gluconate 15 meq/ Multivitamins 10 ml/Chromium/ 

Copper/Manganese/ Seleni/Zn 0.5 ml/ Insulin Human Regular 25 unit/ Total 

Parenteral Nutrition/Amino Acids/Dextrose/ Fat Emulsion Intravenous 1,400 ml @  

58.333 mls/ hr TPN  CONT IV  Last administered on 4/3/20at 22:19;  Start 4/3/20 

at 22:00;  Stop 4/4/20 at 21:59;  Status DC


Heparin Sodium (Porcine) (Heparin Sodium) 5,000 unit Q12HR SQ  Last administered

on 4/14/20at 08:03;  Start 4/3/20 at 21:00


Ondansetron HCl (Zofran) 4 mg PRN Q6HRS  PRN IV NAUSEA/VOMITING;  Start 4/6/20 

at 07:00;  Stop 4/7/20 at 06:59;  Status DC


Fentanyl Citrate (Fentanyl 2ml Vial) 25 mcg PRN Q5MIN  PRN IV MILD PAIN 1-3;  

Start 4/6/20 at 07:00;  Stop 4/7/20 at 06:59;  Status DC


Fentanyl Citrate (Fentanyl 2ml Vial) 50 mcg PRN Q5MIN  PRN IV MODERATE TO SEVERE

PAIN;  Start 4/6/20 at 07:00;  Stop 4/7/20 at 06:59;  Status DC


Ringer's Solution 1,000 ml @  30 mls/hr Q24H IV ;  Start 4/6/20 at 07:00;  Stop 

4/6/20 at 18:59;  Status DC


Lidocaine HCl (Xylocaine-Mpf 1% 2ml Vial) 2 ml PRN 1X  PRN ID PRIOR TO IV START;

 Start 4/6/20 at 07:00;  Stop 4/7/20 at 06:59;  Status DC


Prochlorperazine Edisylate (Compazine) 5 mg PACU PRN  PRN IV NAUSEA, MRX1;  

Start 4/6/20 at 07:00;  Stop 4/7/20 at 06:59;  Status DC


Sodium Chloride 1,000 ml @  1,000 mls/hr Q1H PRN IV hypotension;  Start 4/4/20 

at 09:10;  Stop 4/4/20 at 15:09;  Status DC


Albumin Human 200 ml @  200 mls/hr 1X PRN  PRN IV Hypotension Last administered 

on 4/4/20at 10:10;  Start 4/4/20 at 09:15;  Stop 4/4/20 at 15:14;  Status DC


Sodium Chloride 1,000 ml @  400 mls/hr Q2H30M PRN IV PATENCY;  Start 4/4/20 at 

09:10;  Stop 4/4/20 at 21:09;  Status DC


Info (PHARMACY MONITORING -- do not chart) 1 each PRN DAILY  PRN MC SEE 

COMMENTS;  Start 4/4/20 at 09:15;  Stop 4/5/20 at 12:45;  Status DC


Info (PHARMACY MONITORING -- do not chart) 1 each PRN DAILY  PRN MC SEE 

COMMENTS;  Start 4/4/20 at 09:15;  Stop 4/5/20 at 12:45;  Status DC


Sodium Chloride 90 meq/Potassium Chloride 15 meq/ Potassium Phosphate 10 mmol/ 

Magnesium Sulfate 8 meq/Calcium Gluconate 15 meq/ Multivitamins 10 ml/Chromium/ 

Copper/Manganese/ Seleni/Zn 0.5 ml/ Insulin Human Regular 25 unit/ Total 

Parenteral Nutrition/Amino Acids/Dextrose/ Fat Emulsion Intravenous 1,400 ml @  

58.333 mls/ hr TPN  CONT IV  Last administered on 4/4/20at 22:10;  Start 4/4/20 

at 22:00;  Stop 4/5/20 at 21:59;  Status DC


Magnesium Sulfate 50 ml @ 25 mls/hr PRN DAILY  PRN IV for Mag < 1.7 on am labs; 

Start 4/5/20 at 09:15


Sodium Chloride 90 meq/Potassium Chloride 15 meq/ Potassium Phosphate 10 mmol/ 

Magnesium Sulfate 8 meq/Calcium Gluconate 15 meq/ Multivitamins 10 ml/Chromium/ 

Copper/Manganese/ Seleni/Zn 0.5 ml/ Insulin Human Regular 25 unit/ Total 

Parenteral Nutrition/Amino Acids/Dextrose/ Fat Emulsion Intravenous 1,400 ml @  

58.333 mls/ hr TPN  CONT IV  Last administered on 4/5/20at 21:20;  Start 4/5/20 

at 22:00;  Stop 4/6/20 at 21:59;  Status DC


Sodium Chloride 1,000 ml @  1,000 mls/hr Q1H PRN IV hypotension;  Start 4/5/20 

at 12:23;  Stop 4/5/20 at 18:22;  Status DC


Albumin Human 200 ml @  200 mls/hr 1X  ONCE IV  Last administered on 4/5/20at 

13:34;  Start 4/5/20 at 12:30;  Stop 4/5/20 at 13:29;  Status DC


Diphenhydramine HCl (Benadryl) 25 mg 1X PRN  PRN IV ITCHING;  Start 4/5/20 at 

12:30;  Stop 4/6/20 at 12:29;  Status DC


Diphenhydramine HCl (Benadryl) 25 mg 1X PRN  PRN IV ITCHING;  Start 4/5/20 at 

12:30;  Stop 4/6/20 at 12:29;  Status DC


Info (PHARMACY MONITORING -- do not chart) 1 each PRN DAILY  PRN MC SEE 

COMMENTS;  Start 4/5/20 at 12:30;  Status Cancel


Bupivacaine HCl/ Epinephrine Bitart (Sensorcain-Epi 0.5%-1:144257 Mpf) 30 ml 

STK-MED ONCE .ROUTE  Last administered on 4/6/20at 11:44;  Start 4/6/20 at 

11:00;  Stop 4/6/20 at 11:01;  Status DC


Cellulose (Surgicel Fibrillar 1x2) 1 each STK-MED ONCE .ROUTE ;  Start 4/6/20 at

11:00;  Stop 4/6/20 at 11:01;  Status DC


Sodium Chloride 90 meq/Potassium Chloride 15 meq/ Potassium Phosphate 10 mmol/ 

Magnesium Sulfate 12 meq/Calcium Gluconate 15 meq/ Multivitamins 10 ml/Chromium/

Copper/Manganese/ Seleni/Zn 0.5 ml/ Insulin Human Regular 25 unit/ Total Pare

nteral Nutrition/Amino Acids/Dextrose/ Fat Emulsion Intravenous 1,400 ml @  

58.333 mls/ hr TPN  CONT IV  Last administered on 4/6/20at 22:24;  Start 4/6/20 

at 22:00;  Stop 4/7/20 at 21:59;  Status DC


Propofol 20 ml @ As Directed STK-MED ONCE IV ;  Start 4/6/20 at 11:07;  Stop 

4/6/20 at 11:07;  Status DC


Cellulose (Surgicel Hemostat 4x8) 1 each STK-MED ONCE .ROUTE  Last administered 

on 4/6/20at 11:44;  Start 4/6/20 at 11:55;  Stop 4/6/20 at 11:56;  Status DC


Sevoflurane (Ultane) 60 ml STK-MED ONCE IH ;  Start 4/6/20 at 12:46;  Stop 

4/6/20 at 12:46;  Status DC


Sodium Chloride 1,000 ml @  1,000 mls/hr Q1H PRN IV hypotension;  Start 4/6/20 

at 13:51;  Stop 4/6/20 at 19:50;  Status DC


Albumin Human 200 ml @  200 mls/hr 1X PRN  PRN IV Hypotension Last administered 

on 4/6/20at 14:51;  Start 4/6/20 at 14:00;  Stop 4/6/20 at 19:59;  Status DC


Diphenhydramine HCl (Benadryl) 25 mg 1X PRN  PRN IV ITCHING;  Start 4/6/20 at 

14:00;  Stop 4/7/20 at 13:59;  Status DC


Diphenhydramine HCl (Benadryl) 25 mg 1X PRN  PRN IV ITCHING;  Start 4/6/20 at 

14:00;  Stop 4/7/20 at 13:59;  Status DC


Sodium Chloride 1,000 ml @  400 mls/hr Q2H30M PRN IV PATENCY;  Start 4/6/20 at 

13:51;  Stop 4/7/20 at 01:50;  Status DC


Info (PHARMACY MONITORING -- do not chart) 1 each PRN DAILY  PRN MC SEE COMME

NTS;  Start 4/6/20 at 14:00;  Stop 4/9/20 at 08:16;  Status DC


Heparin Sodium (Porcine) (Hep Lock Adult) 500 unit STK-MED ONCE IVP ;  Start 

4/7/20 at 09:29;  Stop 4/7/20 at 09:30;  Status DC


Sodium Chloride 1,000 ml @  1,000 mls/hr Q1H PRN IV hypotension;  Start 4/7/20 

at 10:43;  Stop 4/7/20 at 16:42;  Status DC


Sodium Chloride 1,000 ml @  400 mls/hr Q2H30M PRN IV PATENCY;  Start 4/7/20 at 

10:43;  Stop 4/7/20 at 22:42;  Status DC


Info (PHARMACY MONITORING -- do not chart) 1 each PRN DAILY  PRN MC SEE MIKEY

TS;  Start 4/7/20 at 10:45;  Status UNV


Info (PHARMACY MONITORING -- do not chart) 1 each PRN DAILY  PRN MC SEE 

COMMENTS;  Start 4/7/20 at 10:45;  Status UNV


Sodium Chloride 90 meq/Potassium Chloride 15 meq/ Magnesium Sulfate 12 

meq/Calcium Gluconate 15 meq/ Multivitamins 10 ml/Chromium/ Copper/Manganese/ 

Seleni/Zn 0.5 ml/ Insulin Human Regular 25 unit/ Total Parenteral Nutrition/A

rajesh Acids/Dextrose/ Fat Emulsion Intravenous 1,400 ml @  58.333 mls/ hr TPN  

CONT IV  Last administered on 4/7/20at 22:13;  Start 4/7/20 at 22:00;  Stop 

4/8/20 at 21:59;  Status DC


Sodium Chloride 1,000 ml @  1,000 mls/hr Q1H PRN IV hypotension;  Start 4/8/20 

at 07:50;  Stop 4/8/20 at 13:49;  Status DC


Albumin Human 200 ml @  200 mls/hr 1X  ONCE IV ;  Start 4/8/20 at 08:00;  Stop 

4/8/20 at 08:53;  Status DC


Diphenhydramine HCl (Benadryl) 25 mg 1X PRN  PRN IV ITCHING;  Start 4/8/20 at 

08:00;  Stop 4/9/20 at 07:59;  Status DC


Diphenhydramine HCl (Benadryl) 25 mg 1X PRN  PRN IV ITCHING;  Start 4/8/20 at 

08:00;  Stop 4/9/20 at 07:59;  Status DC


Info (PHARMACY MONITORING -- do not chart) 1 each PRN DAILY  PRN MC SEE 

COMMENTS;  Start 4/8/20 at 08:00;  Stop 4/9/20 at 08:16;  Status DC


Albumin Human 50 ml @ 50 mls/hr 1X  ONCE IV ;  Start 4/8/20 at 08:53;  Stop 

4/8/20 at 08:56;  Status DC


Albumin Human 200 ml @  50 mls/hr PRN 1X  PRN IV HYPOTENSION Last administered 

on 4/8/20at 09:09;  Start 4/8/20 at 09:00


Meropenem 500 mg/ Sodium Chloride 50 ml @  100 mls/hr Q12H IV  Last administered

on 4/13/20at 22:03;  Start 4/8/20 at 10:00


Sodium Chloride 90 meq/Magnesium Sulfate 12 meq/ Calcium Gluconate 15 meq/ 

Multivitamins 10 ml/Chromium/ Copper/Manganese/ Seleni/Zn 0.5 ml/ Insulin Human 

Regular 25 unit/ Total Parenteral Nutrition/Amino Acids/Dextrose/ Fat Emulsion 

Intravenous 1,400 ml @  58.333 mls/ hr TPN  CONT IV  Last administered on 

4/8/20at 21:41;  Start 4/8/20 at 22:00;  Stop 4/9/20 at 21:59;  Status DC


Sodium Chloride 1,000 ml @  1,000 mls/hr Q1H PRN IV hypotension;  Start 4/9/20 

at 07:58;  Stop 4/9/20 at 13:57;  Status DC


Albumin Human 200 ml @  200 mls/hr 1X PRN  PRN IV Hypotension Last administered 

on 4/9/20at 09:30;  Start 4/9/20 at 08:00;  Stop 4/9/20 at 13:59;  Status DC


Sodium Chloride 1,000 ml @  400 mls/hr Q2H30M PRN IV PATENCY;  Start 4/9/20 at 

07:58;  Stop 4/9/20 at 19:57;  Status DC


Info (PHARMACY MONITORING -- do not chart) 1 each PRN DAILY  PRN MC SEE 

COMMENTS;  Start 4/9/20 at 08:00;  Status Cancel


Info (PHARMACY MONITORING -- do not chart) 1 each PRN DAILY  PRN MC SEE 

COMMENTS;  Start 4/9/20 at 08:15;  Status UNV


Sodium Chloride 90 meq/Potassium Phosphate 5 mmol/ Magnesium Sulfate 12 

meq/Calcium Gluconate 15 meq/ Multivitamins 10 ml/Chromium/ Copper/Manganese/ 

Seleni/Zn 0.5 ml/ Insulin Human Regular 30 unit/ Total Parenteral 

Nutrition/Amino Acids/Dextrose/ Fat Emulsion Intravenous 1,400 ml @  58.333 mls/

hr TPN  CONT IV  Last administered on 4/9/20at 22:08;  Start 4/9/20 at 22:00;  

Stop 4/10/20 at 21:59;  Status DC


Linezolid/Dextrose 300 ml @  300 mls/hr Q12HR IV  Last administered on 4/13/20at

21:35;  Start 4/10/20 at 11:00


Sodium Chloride 90 meq/Potassium Phosphate 15 mmol/ Magnesium Sulfate 12 

meq/Calcium Gluconate 15 meq/ Multivitamins 10 ml/Chromium/ Copper/Manganese/ 

Seleni/Zn 0.5 ml/ Insulin Human Regular 30 unit/ Total Parenteral 

Nutrition/Amino Acids/Dextrose/ Fat Emulsion Intravenous 1,400 ml @  58.333 mls/

hr TPN  CONT IV  Last administered on 4/10/20at 21:49;  Start 4/10/20 at 22:00; 

Stop 4/11/20 at 21:59;  Status DC


Sodium Chloride 90 meq/Potassium Phosphate 15 mmol/ Magnesium Sulfate 12 

meq/Calcium Gluconate 15 meq/ Multivitamins 10 ml/Chromium/ Copper/Manganese/ 

Seleni/Zn 0.5 ml/ Insulin Human Regular 40 unit/ Total Parenteral 

Nutrition/Amino Acids/Dextrose/ Fat Emulsion Intravenous 1,400 ml @  58.333 mls/

hr TPN  CONT IV  Last administered on 4/11/20at 21:21;  Start 4/11/20 at 22:00; 

Stop 4/12/20 at 21:59;  Status DC


Sodium Chloride 1,000 ml @  1,000 mls/hr Q1H PRN IV hypotension;  Start 4/11/20 

at 13:26;  Stop 4/11/20 at 19:25;  Status DC


Albumin Human 200 ml @  200 mls/hr 1X PRN  PRN IV Hypotension Last administered 

on 4/11/20at 15:00;  Start 4/11/20 at 13:30;  Stop 4/11/20 at 19:29;  Status DC


Sodium Chloride (Normal Saline Flush) 10 ml 1X PRN  PRN IV AP catheter pack;  

Start 4/11/20 at 13:30;  Stop 4/12/20 at 13:29;  Status DC


Sodium Chloride (Normal Saline Flush) 10 ml 1X PRN  PRN IV  catheter pack;  

Start 4/11/20 at 13:30;  Stop 4/12/20 at 13:29;  Status DC


Sodium Chloride 1,000 ml @  400 mls/hr Q2H30M PRN IV PATENCY;  Start 4/11/20 at 

13:26;  Stop 4/12/20 at 01:25;  Status DC


Info (PHARMACY MONITORING -- do not chart) 1 each PRN DAILY  PRN MC SEE 

COMMENTS;  Start 4/11/20 at 13:30;  Stop 4/11/20 at 13:33;  Status DC


Info (PHARMACY MONITORING -- do not chart) 1 each PRN DAILY  PRN MC SEE 

COMMENTS;  Start 4/11/20 at 13:30;  Stop 4/11/20 at 13:34;  Status DC


Sodium Chloride 90 meq/Potassium Phosphate 19 mmol/ Magnesium Sulfate 12 

meq/Calcium Gluconate 15 meq/ Multivitamins 10 ml/Chromium/ Copper/Manganese/ 

Seleni/Zn 0.5 ml/ Insulin Human Regular 40 unit/ Total Parenteral 

Nutrition/Amino Acids/Dextrose/ Fat Emulsion Intravenous 1,400 ml @  58.333 mls/

hr TPN  CONT IV  Last administered on 4/12/20at 21:54;  Start 4/12/20 at 22:00; 

Stop 4/13/20 at 21:59;  Status DC


Sodium Chloride 1,000 ml @  1,000 mls/hr Q1H PRN IV hypotension;  Start 4/13/20 

at 09:35;  Stop 4/13/20 at 15:34;  Status DC


Albumin Human 200 ml @  200 mls/hr 1X PRN  PRN IV Hypotension;  Start 4/13/20 at

09:45;  Stop 4/13/20 at 15:44;  Status DC


Diphenhydramine HCl (Benadryl) 25 mg 1X PRN  PRN IV ITCHING;  Start 4/13/20 at 

09:45;  Stop 4/14/20 at 09:44


Diphenhydramine HCl (Benadryl) 25 mg 1X PRN  PRN IV ITCHING;  Start 4/13/20 at 

09:45;  Stop 4/14/20 at 09:44


Sodium Chloride 1,000 ml @  400 mls/hr Q2H30M PRN IV PATENCY;  Start 4/13/20 at 

09:35;  Stop 4/13/20 at 21:34;  Status DC


Info (PHARMACY MONITORING -- do not chart) 1 each PRN DAILY  PRN MC SEE 

COMMENTS;  Start 4/13/20 at 09:45


Sodium Chloride 100 meq/Potassium Phosphate 19 mmol/ Magnesium Sulfate 12 

meq/Calcium Gluconate 15 meq/ Multivitamins 10 ml/Chromium/ Copper/Manganese/ 

Seleni/Zn 0.5 ml/ Insulin Human Regular 40 unit/ Potassium Chloride 20 meq/ 

Total Parenteral Nutrition/Amino Acids/Dextrose/ Fat Emulsion Intravenous 1,400 

ml @  58.333 mls/ hr TPN  CONT IV  Last administered on 4/13/20at 22:02;  Start 

4/13/20 at 22:00;  Stop 4/14/20 at 21:59


Furosemide (Lasix) 40 mg 1X  ONCE IVP  Last administered on 4/13/20at 14:39;  

Start 4/13/20 at 14:30;  Stop 4/13/20 at 14:31;  Status DC





Active Scripts


Active


Reported


Bisoprolol Fumarate 5 Mg Tablet 10 Mg PO DAILY


Vitals/I & O





Vital Sign - Last 24 Hours








 4/13/20 4/13/20 4/13/20 4/13/20





 08:30 09:00 12:10 14:00


 


Pulse  109  


 


Resp  25  


 


B/P (MAP)  131/78 (95)  


 


Pulse Ox 100 98 100 


 


O2 Delivery Ventilator Ventilator Ventilator Mechanical Ventilator





 4/13/20 4/13/20 4/13/20 4/13/20





 14:00 14:00 14:18 14:50


 


Temp 98.3   





 98.3   


 


Pulse 110   


 


Resp 28  29 34


 


B/P (MAP) 136/80 (98)   


 


Pulse Ox 99  100 97


 


O2 Delivery Ventilator Ventilator Ventilator Ventilator


 


    





    





 4/13/20 4/13/20 4/13/20 4/13/20





 15:00 15:40 16:00 16:20


 


Pulse 113  124 


 


Resp 25  29 


 


B/P (MAP) 150/86 (107)  167/86 (113) 


 


Pulse Ox 97  97 99


 


O2 Delivery Ventilator Mechanical Ventilator Ventilator Ventilator





 4/13/20 4/13/20 4/13/20 4/13/20





 17:00 18:00 19:00 19:48


 


Pulse 104 105 112 


 


Resp 20 22 20 


 


B/P (MAP) 135/74 (94) 132/77 (95) 146/78 (100) 


 


Pulse Ox 98 98 98 98


 


O2 Delivery Ventilator Ventilator Ventilator Ventilator





 4/13/20 4/13/20 4/13/20 4/13/20





 20:00 20:00 21:00 22:00


 


Temp  98.6  





  98.6  


 


Pulse  106 120 108


 


Resp  32 20 26


 


B/P (MAP)  149/78 (101) 161/78 (105) 147/84 (105)


 


Pulse Ox  98 98 99


 


O2 Delivery Mechanical Ventilator Ventilator Ventilator Ventilator


 


    





    





 4/13/20 4/13/20 4/14/20 4/14/20





 23:00 23:20 00:00 00:04


 


Temp   98.2 





   98.2 


 


Pulse 132  108 


 


Resp 30 23 


 


B/P (MAP) 159/84 (109)  149/82 (104) 


 


Pulse Ox 98 100 99 


 


O2 Delivery Ventilator Ventilator Ventilator Mechanical Ventilator


 


    





    





 4/14/20 4/14/20 4/14/20 4/14/20





 01:00 02:00 02:47 03:00


 


Pulse 114 112  124


 


Resp 28 24 30


 


B/P (MAP) 160/99 (119) 144/82 (102)  158/87 (110)


 


Pulse Ox 99 99 99 98


 


O2 Delivery Ventilator Ventilator Ventilator Ventilator





 4/14/20 4/14/20 4/14/20 4/14/20





 04:00 04:00 05:00 05:15


 


Temp 98.4   





 98.4   


 


Pulse 120  118 


 


Resp 28 22 


 


B/P (MAP) 161/86 (111)  132/94 (107) 


 


Pulse Ox 99  99 100


 


O2 Delivery Ventilator Mechanical Ventilator Ventilator Ventilator


 


    





    





 4/14/20 4/14/20 4/14/20 4/14/20





 06:00 07:00 07:37 07:44


 


Temp  100.2  





  100.2  


 


Pulse 112 116  


 


Resp 22 26  


 


B/P (MAP) 159/88 (111) 160/84 (109)  


 


Pulse Ox 99 99 100 


 


O2 Delivery Ventilator Ventilator Ventilator Mechanical Ventilator


 


    





    





 4/14/20   





 08:03   


 


Resp 24   


 


Pulse Ox 98   


 


O2 Delivery Ventilator   














Intake and Output   


 


 4/13/20 4/13/20 4/14/20





 15:00 23:00 07:00


 


Intake Total 50 ml 2272.8 ml 566 ml


 


Output Total 99 ml 235 ml 390 ml


 


Balance -49 ml 2037.8 ml 176 ml











Hemodynamically unstable?:  No


Is patient in severe pain?:  No


Is NPO status required?:  Yes











GAETANO GONCALVES MD        Apr 14, 2020 08:15

## 2020-04-15 VITALS — DIASTOLIC BLOOD PRESSURE: 79 MMHG | SYSTOLIC BLOOD PRESSURE: 137 MMHG

## 2020-04-15 VITALS — DIASTOLIC BLOOD PRESSURE: 91 MMHG | SYSTOLIC BLOOD PRESSURE: 165 MMHG

## 2020-04-15 VITALS — SYSTOLIC BLOOD PRESSURE: 133 MMHG | DIASTOLIC BLOOD PRESSURE: 80 MMHG

## 2020-04-15 VITALS — DIASTOLIC BLOOD PRESSURE: 66 MMHG | SYSTOLIC BLOOD PRESSURE: 122 MMHG

## 2020-04-15 VITALS — DIASTOLIC BLOOD PRESSURE: 74 MMHG | SYSTOLIC BLOOD PRESSURE: 137 MMHG

## 2020-04-15 VITALS — SYSTOLIC BLOOD PRESSURE: 116 MMHG | DIASTOLIC BLOOD PRESSURE: 66 MMHG

## 2020-04-15 VITALS — DIASTOLIC BLOOD PRESSURE: 75 MMHG | SYSTOLIC BLOOD PRESSURE: 124 MMHG

## 2020-04-15 VITALS — SYSTOLIC BLOOD PRESSURE: 143 MMHG | DIASTOLIC BLOOD PRESSURE: 76 MMHG

## 2020-04-15 VITALS — DIASTOLIC BLOOD PRESSURE: 77 MMHG | SYSTOLIC BLOOD PRESSURE: 133 MMHG

## 2020-04-15 VITALS — SYSTOLIC BLOOD PRESSURE: 112 MMHG | DIASTOLIC BLOOD PRESSURE: 67 MMHG

## 2020-04-15 VITALS — SYSTOLIC BLOOD PRESSURE: 114 MMHG | DIASTOLIC BLOOD PRESSURE: 75 MMHG

## 2020-04-15 VITALS — SYSTOLIC BLOOD PRESSURE: 111 MMHG | DIASTOLIC BLOOD PRESSURE: 64 MMHG

## 2020-04-15 VITALS — SYSTOLIC BLOOD PRESSURE: 114 MMHG | DIASTOLIC BLOOD PRESSURE: 60 MMHG

## 2020-04-15 VITALS — DIASTOLIC BLOOD PRESSURE: 70 MMHG | SYSTOLIC BLOOD PRESSURE: 108 MMHG

## 2020-04-15 VITALS — SYSTOLIC BLOOD PRESSURE: 114 MMHG | DIASTOLIC BLOOD PRESSURE: 63 MMHG

## 2020-04-15 VITALS — DIASTOLIC BLOOD PRESSURE: 81 MMHG | SYSTOLIC BLOOD PRESSURE: 154 MMHG

## 2020-04-15 VITALS — DIASTOLIC BLOOD PRESSURE: 65 MMHG | SYSTOLIC BLOOD PRESSURE: 116 MMHG

## 2020-04-15 VITALS — DIASTOLIC BLOOD PRESSURE: 84 MMHG | SYSTOLIC BLOOD PRESSURE: 147 MMHG

## 2020-04-15 VITALS — SYSTOLIC BLOOD PRESSURE: 112 MMHG | DIASTOLIC BLOOD PRESSURE: 64 MMHG

## 2020-04-15 VITALS — SYSTOLIC BLOOD PRESSURE: 100 MMHG | DIASTOLIC BLOOD PRESSURE: 54 MMHG

## 2020-04-15 VITALS — DIASTOLIC BLOOD PRESSURE: 75 MMHG | SYSTOLIC BLOOD PRESSURE: 138 MMHG

## 2020-04-15 VITALS — DIASTOLIC BLOOD PRESSURE: 85 MMHG | SYSTOLIC BLOOD PRESSURE: 140 MMHG

## 2020-04-15 LAB
ALBUMIN SERPL-MCNC: 3.1 G/DL (ref 3.4–5)
ALBUMIN/GLOB SERPL: 1 {RATIO} (ref 1–1.7)
ALP SERPL-CCNC: 72 U/L (ref 46–116)
ALT SERPL-CCNC: 16 U/L (ref 14–59)
ANION GAP SERPL CALC-SCNC: 8 MMOL/L (ref 6–14)
AST SERPL-CCNC: 31 U/L (ref 15–37)
BASOPHILS # BLD AUTO: 0 X10^3/UL (ref 0–0.2)
BASOPHILS NFR BLD: 0 % (ref 0–3)
BILIRUB SERPL-MCNC: 0.6 MG/DL (ref 0.2–1)
BUN SERPL-MCNC: 59 MG/DL (ref 7–20)
BUN/CREAT SERPL: 39 (ref 6–20)
CALCIUM SERPL-MCNC: 8.9 MG/DL (ref 8.5–10.1)
CHLORIDE SERPL-SCNC: 101 MMOL/L (ref 98–107)
CO2 SERPL-SCNC: 29 MMOL/L (ref 21–32)
CREAT SERPL-MCNC: 1.5 MG/DL (ref 0.6–1)
EOSINOPHIL NFR BLD: 0 X10^3/UL (ref 0–0.7)
EOSINOPHIL NFR BLD: 1 % (ref 0–3)
ERYTHROCYTE [DISTWIDTH] IN BLOOD BY AUTOMATED COUNT: 18.6 % (ref 11.5–14.5)
GFR SERPLBLD BASED ON 1.73 SQ M-ARVRAT: 36.9 ML/MIN
GLOBULIN SER-MCNC: 3 G/DL (ref 2.2–3.8)
GLUCOSE SERPL-MCNC: 139 MG/DL (ref 70–99)
HCT VFR BLD CALC: 25.2 % (ref 36–47)
HGB BLD-MCNC: 8.2 G/DL (ref 12–15.5)
LYMPHOCYTES # BLD: 0.9 X10^3/UL (ref 1–4.8)
LYMPHOCYTES NFR BLD AUTO: 16 % (ref 24–48)
MAGNESIUM SERPL-MCNC: 2 MG/DL (ref 1.8–2.4)
MCH RBC QN AUTO: 32 PG (ref 25–35)
MCHC RBC AUTO-ENTMCNC: 33 G/DL (ref 31–37)
MCV RBC AUTO: 97 FL (ref 79–100)
MONO #: 0.8 X10^3/UL (ref 0–1.1)
MONOCYTES NFR BLD: 13 % (ref 0–9)
NEUT #: 4 X10^3/UL (ref 1.8–7.7)
NEUTROPHILS NFR BLD AUTO: 70 % (ref 31–73)
PHOSPHATE SERPL-MCNC: 3.9 MG/DL (ref 2.6–4.7)
PLATELET # BLD AUTO: 433 X10^3/UL (ref 140–400)
POTASSIUM SERPL-SCNC: 3.9 MMOL/L (ref 3.5–5.1)
PROT SERPL-MCNC: 6.1 G/DL (ref 6.4–8.2)
PROTHROMBIN TIME: 15.6 SEC (ref 11.7–14)
RBC # BLD AUTO: 2.61 X10^6/UL (ref 3.5–5.4)
SODIUM SERPL-SCNC: 138 MMOL/L (ref 136–145)
WBC # BLD AUTO: 5.8 X10^3/UL (ref 4–11)

## 2020-04-15 PROCEDURE — 0W9G3ZZ DRAINAGE OF PERITONEAL CAVITY, PERCUTANEOUS APPROACH: ICD-10-PCS | Performed by: INTERNAL MEDICINE

## 2020-04-15 RX ADMIN — HEPARIN SODIUM SCH UNIT: 5000 INJECTION, SOLUTION INTRAVENOUS; SUBCUTANEOUS at 21:16

## 2020-04-15 RX ADMIN — PANTOPRAZOLE SODIUM SCH MG: 40 INJECTION, POWDER, FOR SOLUTION INTRAVENOUS at 07:38

## 2020-04-15 RX ADMIN — DEXMEDETOMIDINE HYDROCHLORIDE PRN MLS/HR: 100 INJECTION, SOLUTION, CONCENTRATE INTRAVENOUS at 20:42

## 2020-04-15 RX ADMIN — HEPARIN SODIUM SCH UNIT: 5000 INJECTION, SOLUTION INTRAVENOUS; SUBCUTANEOUS at 07:42

## 2020-04-15 RX ADMIN — DEXMEDETOMIDINE HYDROCHLORIDE PRN MLS/HR: 100 INJECTION, SOLUTION, CONCENTRATE INTRAVENOUS at 11:59

## 2020-04-15 RX ADMIN — INSULIN LISPRO SCH UNITS: 100 INJECTION, SOLUTION INTRAVENOUS; SUBCUTANEOUS at 23:50

## 2020-04-15 RX ADMIN — DEXMEDETOMIDINE HYDROCHLORIDE PRN MLS/HR: 100 INJECTION, SOLUTION, CONCENTRATE INTRAVENOUS at 10:26

## 2020-04-15 RX ADMIN — INSULIN LISPRO SCH UNITS: 100 INJECTION, SOLUTION INTRAVENOUS; SUBCUTANEOUS at 05:44

## 2020-04-15 RX ADMIN — DEXMEDETOMIDINE HYDROCHLORIDE PRN MLS/HR: 100 INJECTION, SOLUTION, CONCENTRATE INTRAVENOUS at 08:05

## 2020-04-15 RX ADMIN — MEROPENEM SCH MLS/HR: 500 INJECTION, POWDER, FOR SOLUTION INTRAVENOUS at 09:58

## 2020-04-15 RX ADMIN — DEXMEDETOMIDINE HYDROCHLORIDE PRN MLS/HR: 100 INJECTION, SOLUTION, CONCENTRATE INTRAVENOUS at 22:22

## 2020-04-15 RX ADMIN — Medication PRN EACH: at 10:54

## 2020-04-15 RX ADMIN — MEROPENEM SCH MLS/HR: 500 INJECTION, POWDER, FOR SOLUTION INTRAVENOUS at 22:21

## 2020-04-15 RX ADMIN — DEXTROSE SCH MLS/HR: 50 INJECTION, SOLUTION INTRAVENOUS at 08:54

## 2020-04-15 RX ADMIN — DEXMEDETOMIDINE HYDROCHLORIDE PRN MLS/HR: 100 INJECTION, SOLUTION, CONCENTRATE INTRAVENOUS at 01:09

## 2020-04-15 RX ADMIN — INSULIN LISPRO SCH UNITS: 100 INJECTION, SOLUTION INTRAVENOUS; SUBCUTANEOUS at 11:59

## 2020-04-15 RX ADMIN — INSULIN LISPRO SCH UNITS: 100 INJECTION, SOLUTION INTRAVENOUS; SUBCUTANEOUS at 18:00

## 2020-04-15 RX ADMIN — DEXMEDETOMIDINE HYDROCHLORIDE PRN MLS/HR: 100 INJECTION, SOLUTION, CONCENTRATE INTRAVENOUS at 04:41

## 2020-04-15 RX ADMIN — DEXMEDETOMIDINE HYDROCHLORIDE PRN MLS/HR: 100 INJECTION, SOLUTION, CONCENTRATE INTRAVENOUS at 14:24

## 2020-04-15 RX ADMIN — DEXMEDETOMIDINE HYDROCHLORIDE PRN MLS/HR: 100 INJECTION, SOLUTION, CONCENTRATE INTRAVENOUS at 17:29

## 2020-04-15 NOTE — NUR
SS following up with discharge planning. SS awaiting Medicaid application to be submitted. 
SS discussed with pt RN. Pt having Paracentesis today. Pt remains on vent with trach, 
hemodialysis, and TPN. Pt needing LTAC but is self pay pt. [facility] 
[facility] not accepting self pay pt's at this time. SS will continue to follow for 
discharge planning.

## 2020-04-15 NOTE — RAD
Procedure: Ultrasound guided paracentesis.



Indication: Ascites, pancreatitis



The procedure, risks, and complications, to include damage to hollow and solid

abdominal viscera, bleeding, peritonitis and infection were explained to the

patient and they understood same and wished to proceed. Consent form signed.



The left upper quadrant was prepped and draped using maximal sterile technique

and 1% Xylocaine used for local anesthesia.



Ultrasound-guided paracentesis: Under ultrasound guidance, a large pocket of

fluid was identified and an 18 gauge Yueh needle was inserted into the fluid

and 4300 cc of thin, light brown fluid was removed.  The catheter was removed

and pressure placed.  Sample of fluid was sent to the lab for testing. An

ultrasound image was saved and sent to PACS. 



The patient tolerated the procedure well. Procedure was performed at bedside.



IMPRESSION: Ultrasound-guided paracentesis.

## 2020-04-15 NOTE — PDOC
PULMONARY PROGRESS NOTES


Subjective


Patient intubated on 3/23 , s/p trach


more awake, tracking


AC mode


Vitals





Vital Signs








  Date Time  Temp Pulse Resp B/P (MAP) Pulse Ox O2 Delivery O2 Flow Rate FiO2


 


4/15/20 09:00  88 19 154/81 (105) 99 Ventilator  


 


4/15/20 08:00 99.6       





 99.6       


 


4/15/20 01:42       6.0 








Lungs:  Other (dimished in BLL)


Cardiovascular:  S1, S2


Abdomen:  Non-tender, Other (distended)


Extremities:  Other (+3 generalized edema )


Skin:  Warm, Dry


Labs





Laboratory Tests








Test


 4/13/20


14:11 4/13/20


15:00 4/13/20


18:26 4/14/20


01:07


 


Glucose (Fingerstick)


 151 mg/dL


(70-99) 


 213 mg/dL


(70-99) 174 mg/dL


(70-99)


 


O2 Saturation  94 % (92-99)   


 


Arterial Blood pH


 


 7.46


(7.35-7.45) 


 





 


Arterial Blood pCO2 at


Patient Temp 


 38 mmHg


(35-46) 


 





 


Arterial Blood pO2 at Patient


Temp 


 74 mmHg


() 


 





 


Arterial Blood HCO3


 


 26 mmol/L


(21-28) 


 





 


Arterial Blood Base Excess


 


 2 mmol/L


(-3-3) 


 





 


FiO2  35   


 


Test


 4/14/20


06:39 4/14/20


09:00 4/14/20


14:37 4/14/20


17:27


 


Glucose (Fingerstick)


 157 mg/dL


(70-99) 


 153 mg/dL


(70-99) 205 mg/dL


(70-99)


 


White Blood Count


 


 9.8 x10^3/uL


(4.0-11.0) 


 





 


Red Blood Count


 


 2.87 x10^6/uL


(3.50-5.40) 


 





 


Hemoglobin


 


 9.2 g/dL


(12.0-15.5) 


 





 


Hematocrit


 


 27.9 %


(36.0-47.0) 


 





 


Mean Corpuscular Volume  97 fL ()   


 


Mean Corpuscular Hemoglobin  32 pg (25-35)   


 


Mean Corpuscular Hemoglobin


Concent 


 33 g/dL


(31-37) 


 





 


Red Cell Distribution Width


 


 19.2 %


(11.5-14.5) 


 





 


Platelet Count


 


 380 x10^3/uL


(140-400) 


 





 


Sodium Level


 


 137 mmol/L


(136-145) 


 





 


Potassium Level


 


 3.7 mmol/L


(3.5-5.1) 


 





 


Chloride Level


 


 98 mmol/L


() 


 





 


Carbon Dioxide Level


 


 29 mmol/L


(21-32) 


 





 


Anion Gap  10 (6-14)   


 


Blood Urea Nitrogen


 


 64 mg/dL


(7-20) 


 





 


Creatinine


 


 1.7 mg/dL


(0.6-1.0) 


 





 


Estimated GFR


(Cockcroft-Gault) 


 31.9 


 


 





 


Glucose Level


 


 166 mg/dL


(70-99) 


 





 


Calcium Level


 


 9.0 mg/dL


(8.5-10.1) 


 





 


Test


 4/14/20


23:34 4/15/20


05:41 4/15/20


05:45 





 


Glucose (Fingerstick)


 167 mg/dL


(70-99) 130 mg/dL


(70-99) 


 





 


White Blood Count


 


 


 5.8 x10^3/uL


(4.0-11.0) 





 


Red Blood Count


 


 


 2.61 x10^6/uL


(3.50-5.40) 





 


Hemoglobin


 


 


 8.2 g/dL


(12.0-15.5) 





 


Hematocrit


 


 


 25.2 %


(36.0-47.0) 





 


Mean Corpuscular Volume   97 fL ()  


 


Mean Corpuscular Hemoglobin   32 pg (25-35)  


 


Mean Corpuscular Hemoglobin


Concent 


 


 33 g/dL


(31-37) 





 


Red Cell Distribution Width


 


 


 18.6 %


(11.5-14.5) 





 


Platelet Count


 


 


 433 x10^3/uL


(140-400) 





 


Neutrophils (%) (Auto)   70 % (31-73)  


 


Lymphocytes (%) (Auto)   16 % (24-48)  


 


Monocytes (%) (Auto)   13 % (0-9)  


 


Eosinophils (%) (Auto)   1 % (0-3)  


 


Basophils (%) (Auto)   0 % (0-3)  


 


Neutrophils # (Auto)


 


 


 4.0 x10^3/uL


(1.8-7.7) 





 


Lymphocytes # (Auto)


 


 


 0.9 x10^3/uL


(1.0-4.8) 





 


Monocytes # (Auto)


 


 


 0.8 x10^3/uL


(0.0-1.1) 





 


Eosinophils # (Auto)


 


 


 0.0 x10^3/uL


(0.0-0.7) 





 


Basophils # (Auto)


 


 


 0.0 x10^3/uL


(0.0-0.2) 





 


Sodium Level


 


 


 138 mmol/L


(136-145) 





 


Potassium Level


 


 


 3.9 mmol/L


(3.5-5.1) 





 


Chloride Level


 


 


 101 mmol/L


() 





 


Carbon Dioxide Level


 


 


 29 mmol/L


(21-32) 





 


Anion Gap   8 (6-14)  


 


Blood Urea Nitrogen


 


 


 59 mg/dL


(7-20) 





 


Creatinine


 


 


 1.5 mg/dL


(0.6-1.0) 





 


Estimated GFR


(Cockcroft-Gault) 


 


 36.9 


 





 


BUN/Creatinine Ratio   39 (6-20)  


 


Glucose Level


 


 


 139 mg/dL


(70-99) 





 


Calcium Level


 


 


 8.9 mg/dL


(8.5-10.1) 





 


Phosphorus Level


 


 


 3.9 mg/dL


(2.6-4.7) 





 


Magnesium Level


 


 


 2.0 mg/dL


(1.8-2.4) 





 


Total Bilirubin


 


 


 0.6 mg/dL


(0.2-1.0) 





 


Aspartate Amino Transf


(AST/SGOT) 


 


 31 U/L (15-37) 


 





 


Alanine Aminotransferase


(ALT/SGPT) 


 


 16 U/L (14-59) 


 





 


Alkaline Phosphatase


 


 


 72 U/L


() 





 


Total Protein


 


 


 6.1 g/dL


(6.4-8.2) 





 


Albumin


 


 


 3.1 g/dL


(3.4-5.0) 





 


Albumin/Globulin Ratio   1.0 (1.0-1.7)  


 


Hepatitis B Surface Antigen


 


 


 Nonreactive


(Nonreactive) 











Laboratory Tests








Test


 4/14/20


14:37 4/14/20


17:27 4/14/20


23:34 4/15/20


05:41


 


Glucose (Fingerstick)


 153 mg/dL


(70-99) 205 mg/dL


(70-99) 167 mg/dL


(70-99) 130 mg/dL


(70-99)


 


Test


 4/15/20


05:45 


 


 





 


White Blood Count


 5.8 x10^3/uL


(4.0-11.0) 


 


 





 


Red Blood Count


 2.61 x10^6/uL


(3.50-5.40) 


 


 





 


Hemoglobin


 8.2 g/dL


(12.0-15.5) 


 


 





 


Hematocrit


 25.2 %


(36.0-47.0) 


 


 





 


Mean Corpuscular Volume 97 fL ()    


 


Mean Corpuscular Hemoglobin 32 pg (25-35)    


 


Mean Corpuscular Hemoglobin


Concent 33 g/dL


(31-37) 


 


 





 


Red Cell Distribution Width


 18.6 %


(11.5-14.5) 


 


 





 


Platelet Count


 433 x10^3/uL


(140-400) 


 


 





 


Neutrophils (%) (Auto) 70 % (31-73)    


 


Lymphocytes (%) (Auto) 16 % (24-48)    


 


Monocytes (%) (Auto) 13 % (0-9)    


 


Eosinophils (%) (Auto) 1 % (0-3)    


 


Basophils (%) (Auto) 0 % (0-3)    


 


Neutrophils # (Auto)


 4.0 x10^3/uL


(1.8-7.7) 


 


 





 


Lymphocytes # (Auto)


 0.9 x10^3/uL


(1.0-4.8) 


 


 





 


Monocytes # (Auto)


 0.8 x10^3/uL


(0.0-1.1) 


 


 





 


Eosinophils # (Auto)


 0.0 x10^3/uL


(0.0-0.7) 


 


 





 


Basophils # (Auto)


 0.0 x10^3/uL


(0.0-0.2) 


 


 





 


Sodium Level


 138 mmol/L


(136-145) 


 


 





 


Potassium Level


 3.9 mmol/L


(3.5-5.1) 


 


 





 


Chloride Level


 101 mmol/L


() 


 


 





 


Carbon Dioxide Level


 29 mmol/L


(21-32) 


 


 





 


Anion Gap 8 (6-14)    


 


Blood Urea Nitrogen


 59 mg/dL


(7-20) 


 


 





 


Creatinine


 1.5 mg/dL


(0.6-1.0) 


 


 





 


Estimated GFR


(Cockcroft-Gault) 36.9 


 


 


 





 


BUN/Creatinine Ratio 39 (6-20)    


 


Glucose Level


 139 mg/dL


(70-99) 


 


 





 


Calcium Level


 8.9 mg/dL


(8.5-10.1) 


 


 





 


Phosphorus Level


 3.9 mg/dL


(2.6-4.7) 


 


 





 


Magnesium Level


 2.0 mg/dL


(1.8-2.4) 


 


 





 


Total Bilirubin


 0.6 mg/dL


(0.2-1.0) 


 


 





 


Aspartate Amino Transf


(AST/SGOT) 31 U/L (15-37) 


 


 


 





 


Alanine Aminotransferase


(ALT/SGPT) 16 U/L (14-59) 


 


 


 





 


Alkaline Phosphatase


 72 U/L


() 


 


 





 


Total Protein


 6.1 g/dL


(6.4-8.2) 


 


 





 


Albumin


 3.1 g/dL


(3.4-5.0) 


 


 





 


Albumin/Globulin Ratio 1.0 (1.0-1.7)    


 


Hepatitis B Surface Antigen


 Nonreactive


(Nonreactive) 


 


 











Medications





Active Scripts








 Medications  Dose


 Route/Sig


 Max Daily Dose Days Date Category


 


 Bisoprolol


 Fumarate 5 Mg


 Tablet  10 Mg


 PO DAILY


   3/16/20 Reported








Comments


Chest x-ray reviewed 4/14





IMPRESSION: 


1. moderate pleural effusions with associated atelectasis 





ct chest 4/14


moderate effusions





Impression


.


IMPRESSION:


1.  Acute hypoxemic respiratory failure secondary to ARDS status post trach,


2.  Gallstone pancreatitis.with NECROSIS, NOT A SURGICAL CANDIDATE


3.  Severe metabolic acidosis.stable


4.  Acute kidney injury-stable, ON HD


5.  Acute gallstone pancreatitis.


6.  Hypoalbuminemia.


7.  Moderate persistent effusions


8.  Fever,improved, lines changed


9.  Chronic anemia


10. Covid 19 testing negative


11. Moderate to large ascites


1





Plan


.


More awake today


will dc fentanyl and do CPAP trial 


AC mode qhs


febrile, w/u per ID. new lines done, ct chest/ abdomen reviewed


Hold off on thoracentesis


Will benefit from paracentesis if ok with surgery


HD


follow surgery and ID rec


Discussed with RN and RT.  


Transfuse prn, check H/H -prn


Antibiotics per ID, 


Follow nephrology recs 


Nutritional support with TPN


off pressors


Prognosis is guarded





DVT/GI PPX 


d/w RN/RT








CC time : 30 minutes reviewing labs, patient care and discussing will care team 


d/w DR Phillips. ok with paracentesis











SHARYN SOLORZANO MD                 Apr 15, 2020 10:11

## 2020-04-15 NOTE — PDOC
Subjective:


Subjective:


Denies pain.





Objective:


Objective:


D/w nurse - awake, messing w/ trach and NG sometimes.


Vital Signs:





                                   Vital Signs








  Date Time  Temp Pulse Resp B/P (MAP) Pulse Ox O2 Delivery O2 Flow Rate FiO2


 


4/15/20 09:00  88 19 154/81 (105) 99 Ventilator  


 


4/15/20 08:00 99.6       





 99.6       


 


4/15/20 01:42       6.0 








Labs:





Laboratory Tests








Test


 4/14/20


14:37 4/14/20


17:27 4/14/20


23:34 4/15/20


05:41


 


Glucose (Fingerstick) 153 mg/dL  205 mg/dL  167 mg/dL  130 mg/dL 


 


Test


 4/15/20


05:45 


 


 





 


White Blood Count 5.8 x10^3/uL    


 


Red Blood Count 2.61 x10^6/uL    


 


Hemoglobin 8.2 g/dL    


 


Hematocrit 25.2 %    


 


Mean Corpuscular Volume 97 fL    


 


Mean Corpuscular Hemoglobin 32 pg    


 


Mean Corpuscular Hemoglobin


Concent 33 g/dL 


 


 


 





 


Red Cell Distribution Width 18.6 %    


 


Platelet Count 433 x10^3/uL    


 


Neutrophils (%) (Auto) 70 %    


 


Lymphocytes (%) (Auto) 16 %    


 


Monocytes (%) (Auto) 13 %    


 


Eosinophils (%) (Auto) 1 %    


 


Basophils (%) (Auto) 0 %    


 


Neutrophils # (Auto) 4.0 x10^3/uL    


 


Lymphocytes # (Auto) 0.9 x10^3/uL    


 


Monocytes # (Auto) 0.8 x10^3/uL    


 


Eosinophils # (Auto) 0.0 x10^3/uL    


 


Basophils # (Auto) 0.0 x10^3/uL    


 


Sodium Level 138 mmol/L    


 


Potassium Level 3.9 mmol/L    


 


Chloride Level 101 mmol/L    


 


Carbon Dioxide Level 29 mmol/L    


 


Anion Gap 8    


 


Blood Urea Nitrogen 59 mg/dL    


 


Creatinine 1.5 mg/dL    


 


Estimated GFR


(Cockcroft-Gault) 36.9 


 


 


 





 


BUN/Creatinine Ratio 39    


 


Glucose Level 139 mg/dL    


 


Calcium Level 8.9 mg/dL    


 


Phosphorus Level 3.9 mg/dL    


 


Magnesium Level 2.0 mg/dL    


 


Total Bilirubin 0.6 mg/dL    


 


Aspartate Amino Transf


(AST/SGOT) 31 U/L 


 


 


 





 


Alanine Aminotransferase


(ALT/SGPT) 16 U/L 


 


 


 





 


Alkaline Phosphatase 72 U/L    


 


Total Protein 6.1 g/dL    


 


Albumin 3.1 g/dL    


 


Albumin/Globulin Ratio 1.0    


 


Hepatitis B Surface Antigen Nonreactive    





 URINE CULTURE RES 1  Final  


        No growth


Imaging:


C/A/P CT 4/14


IMPRESSION:


1. Large bilateral effusions.


2. Atelectasis of both lungs.


IMPRESSION:


1. Diffuse pancreatic necrosis.


2. A extensive retroperitoneal fluid and inflammation.


3. Cholelithiasis.


4. Moderate to large volume ascites with little change.





PE:





GEN: NAD


LUNGS: trach/vent, clear


HEART: RRR


ABD: distended, non-tender apparently, NG bilious


NEURO/PSYCH: awake, makes eye contact





A/P:


Gallstone pancreatitis, MOSF





--


Interval CT as above, note plans for speech eval.





Hemodynamically unstable?:  No


Is patient in severe pain?:  No


Is NPO status required?:  No











RAGHAVENDRA MURCIA         Apr 15, 2020 09:45

## 2020-04-15 NOTE — NUR
Pharmacy TPN Dosing Note



S: JESENIA RICH is a 49 year old F Currently receiving Central Continuous TPN started 
03/18/20



B:Pertinent PMH: Necrotizing pancreatitis



Height: 5 feet, 8 inches

Weight: 105.3 kg



Current diet: NPO 



LABS:

Sodium:    138 

Potassium: 3.9 

Chloride:  101 

Calcium:   8.9 

Corrected Calcium: 9.62 

Magnesium: 2 

CO2:       29 

SCr:       1.5 

Glucose:   167, 130, 139 

Albumin:   3.1 

AST:       31 

ALT:       16 



TPN FORMULA:

TPN TYPE:  Central Continuous

AMINO ACIDS:         125 gm

DEXTROSE:            225 gm

LIPIDS:              20 gm

SODIUM CHLORIDE:     100 mEq

POTASSIUM CHLORIDE:  20 mEq

POTASSIUM PHOSPHATE: 19 mmol

MAGNESIUM:           12 mEq

CALCIUM:             15 mEq

INSULIN:             40 units

MULTIPLE VITAMIN:    10 ml

TRACE ELEMENTS:      0.5 ml



TPN PLAN:  

-Electrolytes and triglycerides WNL and stable. Continue same TPN.

-No labs needed tomorrow due to TPN stability.





R: Continue TPN @ current rate and with above formula.

Will monitor electrolytes, glucose, and tolerance to TPN.



 VALERIE SNELL [facility], 04/15/20 [id number]

## 2020-04-15 NOTE — PDOC
Infectious Disease Note


Subjective


Subjective


Alert and asking questions


Trach, vent FiO2 35 % PEEP 5


Now off levaphed 


less diarrhea 


TPN





ROS


ROS


unable to assess





Vital Sign


Vital Signs





Vital Signs








  Date Time  Temp Pulse Resp B/P (MAP) Pulse Ox O2 Delivery O2 Flow Rate FiO2


 


4/15/20 08:13   16  99 Ventilator  


 


4/15/20 06:00  82  116/66 (83)    


 


4/15/20 03:59 99.0       





 99.0       


 


4/15/20 01:42       6.0 











Physical Exam


PHYSICAL EXAM


GENERAL: Alert and coop. mouthing words  Appears comfortable has generalized 

anasarca 


HEENT: Pupils equal, + NGT, oral cavity dry 


NECK:  Trach/vent 


LUNGS: Diminished aeration 


HEART:  S1, S2, regular 


ABDOMEN:  Distended, hypoactive BS, Rectal tube- out


: Pepe  changed 4/14


EXTREMITIES: Generalized edema, no cyanosis, SCDs bilaterally 


DERMATOLOGIC:  Warm and dry.  No generalized rash.  


CENTRAL NERVOUS SYSTEM: Responsive and seems appropriate


HDC, [facility] (4/14) and


RUE-PICC removed 4/14





Labs


Lab





Laboratory Tests








Test


 4/14/20


09:00 4/14/20


14:37 4/14/20


17:27 4/14/20


23:34


 


White Blood Count


 9.8 x10^3/uL


(4.0-11.0) 


 


 





 


Red Blood Count


 2.87 x10^6/uL


(3.50-5.40) 


 


 





 


Hemoglobin


 9.2 g/dL


(12.0-15.5) 


 


 





 


Hematocrit


 27.9 %


(36.0-47.0) 


 


 





 


Mean Corpuscular Volume 97 fL ()    


 


Mean Corpuscular Hemoglobin 32 pg (25-35)    


 


Mean Corpuscular Hemoglobin


Concent 33 g/dL


(31-37) 


 


 





 


Red Cell Distribution Width


 19.2 %


(11.5-14.5) 


 


 





 


Platelet Count


 380 x10^3/uL


(140-400) 


 


 





 


Sodium Level


 137 mmol/L


(136-145) 


 


 





 


Potassium Level


 3.7 mmol/L


(3.5-5.1) 


 


 





 


Chloride Level


 98 mmol/L


() 


 


 





 


Carbon Dioxide Level


 29 mmol/L


(21-32) 


 


 





 


Anion Gap 10 (6-14)    


 


Blood Urea Nitrogen


 64 mg/dL


(7-20) 


 


 





 


Creatinine


 1.7 mg/dL


(0.6-1.0) 


 


 





 


Estimated GFR


(Cockcroft-Gault) 31.9 


 


 


 





 


Glucose Level


 166 mg/dL


(70-99) 


 


 





 


Calcium Level


 9.0 mg/dL


(8.5-10.1) 


 


 





 


Glucose (Fingerstick)


 


 153 mg/dL


(70-99) 205 mg/dL


(70-99) 167 mg/dL


(70-99)


 


Test


 4/15/20


05:41 4/15/20


05:45 


 





 


Glucose (Fingerstick)


 130 mg/dL


(70-99) 


 


 





 


White Blood Count


 


 5.8 x10^3/uL


(4.0-11.0) 


 





 


Red Blood Count


 


 2.61 x10^6/uL


(3.50-5.40) 


 





 


Hemoglobin


 


 8.2 g/dL


(12.0-15.5) 


 





 


Hematocrit


 


 25.2 %


(36.0-47.0) 


 





 


Mean Corpuscular Volume  97 fL ()   


 


Mean Corpuscular Hemoglobin  32 pg (25-35)   


 


Mean Corpuscular Hemoglobin


Concent 


 33 g/dL


(31-37) 


 





 


Red Cell Distribution Width


 


 18.6 %


(11.5-14.5) 


 





 


Platelet Count


 


 433 x10^3/uL


(140-400) 


 





 


Neutrophils (%) (Auto)  70 % (31-73)   


 


Lymphocytes (%) (Auto)  16 % (24-48)   


 


Monocytes (%) (Auto)  13 % (0-9)   


 


Eosinophils (%) (Auto)  1 % (0-3)   


 


Basophils (%) (Auto)  0 % (0-3)   


 


Neutrophils # (Auto)


 


 4.0 x10^3/uL


(1.8-7.7) 


 





 


Lymphocytes # (Auto)


 


 0.9 x10^3/uL


(1.0-4.8) 


 





 


Monocytes # (Auto)


 


 0.8 x10^3/uL


(0.0-1.1) 


 





 


Eosinophils # (Auto)


 


 0.0 x10^3/uL


(0.0-0.7) 


 





 


Basophils # (Auto)


 


 0.0 x10^3/uL


(0.0-0.2) 


 





 


Sodium Level


 


 138 mmol/L


(136-145) 


 





 


Potassium Level


 


 3.9 mmol/L


(3.5-5.1) 


 





 


Chloride Level


 


 101 mmol/L


() 


 





 


Carbon Dioxide Level


 


 29 mmol/L


(21-32) 


 





 


Anion Gap  8 (6-14)   


 


Blood Urea Nitrogen


 


 59 mg/dL


(7-20) 


 





 


Creatinine


 


 1.5 mg/dL


(0.6-1.0) 


 





 


Estimated GFR


(Cockcroft-Gault) 


 36.9 


 


 





 


BUN/Creatinine Ratio  39 (6-20)   


 


Glucose Level


 


 139 mg/dL


(70-99) 


 





 


Calcium Level


 


 8.9 mg/dL


(8.5-10.1) 


 





 


Phosphorus Level


 


 3.9 mg/dL


(2.6-4.7) 


 





 


Magnesium Level


 


 2.0 mg/dL


(1.8-2.4) 


 





 


Total Bilirubin


 


 0.6 mg/dL


(0.2-1.0) 


 





 


Aspartate Amino Transf


(AST/SGOT) 


 31 U/L (15-37) 


 


 





 


Alanine Aminotransferase


(ALT/SGPT) 


 16 U/L (14-59) 


 


 





 


Alkaline Phosphatase


 


 72 U/L


() 


 





 


Total Protein


 


 6.1 g/dL


(6.4-8.2) 


 





 


Albumin


 


 3.1 g/dL


(3.4-5.0) 


 





 


Albumin/Globulin Ratio  1.0 (1.0-1.7)   








Micro


CT Chest Abd/pelv 4/14


CT CHEST:


CT scan the chest was done without contrast. There is a tracheostomy tube 


in good position. There are large bilateral pleural effusions. There is 


atelectasis in both lung bases. There is no mediastinal adenopathy. Right 


arm PICC line extends to the superior vena cava. There is a temporary 


dialysis catheter extending to the vena cava near the atrium.


 


IMPRESSION:


1. Large bilateral effusions.


2. Atelectasis of both lungs.


 


End impression


 


CT abdomen pelvis


 


CT scan the abdomen pelvis was done following CT chest with contrast. NG 


tube extends into the stomach. Spleen is normal. There is no mass or 


hydronephrosis in the kidneys. There is a gallstone the gallbladder. A 


focal liver lesion is not identified. There is diffuse necrosis of the 


pancreas. There is peripancreatic fluid. The pattern is similar to the 


recent study. There is fluid in the paracolic gutters. There is 


retroperitoneal fluid surrounding the kidneys. Ascites extends into the 


pelvis. Ascites is similar to the prior study. There is no bowel 


obstruction. There is no free air.


 


IMPRESSION:


1. Diffuse pancreatic necrosis.


2. A extensive retroperitoneal fluid and inflammation.


3. Cholelithiasis.


4. Moderate to large volume ascites with little change.



































CT A/P, 4/9


IMPRESSION:


1. Increased ascites.


2. 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


at the pancreas


3. Cholelithiasis with thickening of the gallbladder wall.


4. Persistent pleural effusions and atelectasis in the lung bases.





Objective


Assessment








Leukocytosis 4/10 -improved 


Fever - - better blood cults 4/10 neg.Urine - neg, New L IJ/Pepe and PICC 

removed 4/14


Severe acute gallstone pancreatitis with necrosis


   -CT 4/14 necrotizing pancreatitis with fluid and phlegmon at the pancreas


    Ascites


  


Hypotension off levaphed 


Julian Pleural effusions


JED requiring HD 


   - s/p RIJ temporary dialysis catheter replacement, 4/2


Vent dependent respiratory failure


   -s/p Trach placement 


   -lung opacities, COVID-19 neg 


Anasarca - worse


Anemia - S/p PRBC 3/26


Hypocalcemia 


Prediabetes


HTN


Diarrhea, C. diff neg 3/23


Anemia - S/p PRBCs





Plan


Plan of Care





F/u Blood cults 4/14


Change lines and pepe today 4/14


? Thoracentesis


cont merrem (4/8) and Zyvox (4/10) and micafungin


Add Flagyl 4/14


-previously on cefepime, flagyl & dapto 


Gen surgery following


f/u BC from 4/9 neg to date 


Maintain aspiration precautions 


Anemia deferred to primary





Critically ill





D/w Dr. Stephens


D/w nursing











RASHAWN ROSEN MD              Apr 15, 2020 08:22

## 2020-04-15 NOTE — PDOC
SURGICAL PROGRESS NOTE


Subjective


Pt awakens to voice, has been interactive


Vital Signs





Vital Signs








  Date Time  Temp Pulse Resp B/P (MAP) Pulse Ox O2 Delivery O2 Flow Rate FiO2


 


4/15/20 09:00  88 19 154/81 (105) 99 Ventilator  


 


4/15/20 08:00 99.6       





 99.6       


 


4/15/20 01:42       6.0 








I&O











Intake and Output 


 


 4/15/20





 07:00


 


Intake Total 3415.28 ml


 


Output Total 335 ml


 


Balance 3080.28 ml


 


 


 


Intake Oral 0 ml


 


IV Total 3415.28 ml


 


Output Urine Total 285 ml


 


Gastric Drainage Total 50 ml








General:  Alert, Cooperative, No acute distress


Abdomen:  Soft, No tenderness


Labs





Laboratory Tests








Test


 4/13/20


14:11 4/13/20


15:00 4/13/20


18:26 4/14/20


01:07


 


Glucose (Fingerstick)


 151 mg/dL


(70-99) 


 213 mg/dL


(70-99) 174 mg/dL


(70-99)


 


O2 Saturation  94 % (92-99)   


 


Arterial Blood pH


 


 7.46


(7.35-7.45) 


 





 


Arterial Blood pCO2 at


Patient Temp 


 38 mmHg


(35-46) 


 





 


Arterial Blood pO2 at Patient


Temp 


 74 mmHg


() 


 





 


Arterial Blood HCO3


 


 26 mmol/L


(21-28) 


 





 


Arterial Blood Base Excess


 


 2 mmol/L


(-3-3) 


 





 


FiO2  35   


 


Test


 4/14/20


06:39 4/14/20


09:00 4/14/20


14:37 4/14/20


17:27


 


Glucose (Fingerstick)


 157 mg/dL


(70-99) 


 153 mg/dL


(70-99) 205 mg/dL


(70-99)


 


White Blood Count


 


 9.8 x10^3/uL


(4.0-11.0) 


 





 


Red Blood Count


 


 2.87 x10^6/uL


(3.50-5.40) 


 





 


Hemoglobin


 


 9.2 g/dL


(12.0-15.5) 


 





 


Hematocrit


 


 27.9 %


(36.0-47.0) 


 





 


Mean Corpuscular Volume  97 fL ()   


 


Mean Corpuscular Hemoglobin  32 pg (25-35)   


 


Mean Corpuscular Hemoglobin


Concent 


 33 g/dL


(31-37) 


 





 


Red Cell Distribution Width


 


 19.2 %


(11.5-14.5) 


 





 


Platelet Count


 


 380 x10^3/uL


(140-400) 


 





 


Sodium Level


 


 137 mmol/L


(136-145) 


 





 


Potassium Level


 


 3.7 mmol/L


(3.5-5.1) 


 





 


Chloride Level


 


 98 mmol/L


() 


 





 


Carbon Dioxide Level


 


 29 mmol/L


(21-32) 


 





 


Anion Gap  10 (6-14)   


 


Blood Urea Nitrogen


 


 64 mg/dL


(7-20) 


 





 


Creatinine


 


 1.7 mg/dL


(0.6-1.0) 


 





 


Estimated GFR


(Cockcroft-Gault) 


 31.9 


 


 





 


Glucose Level


 


 166 mg/dL


(70-99) 


 





 


Calcium Level


 


 9.0 mg/dL


(8.5-10.1) 


 





 


Test


 4/14/20


23:34 4/15/20


05:41 4/15/20


05:45 





 


Glucose (Fingerstick)


 167 mg/dL


(70-99) 130 mg/dL


(70-99) 


 





 


White Blood Count


 


 


 5.8 x10^3/uL


(4.0-11.0) 





 


Red Blood Count


 


 


 2.61 x10^6/uL


(3.50-5.40) 





 


Hemoglobin


 


 


 8.2 g/dL


(12.0-15.5) 





 


Hematocrit


 


 


 25.2 %


(36.0-47.0) 





 


Mean Corpuscular Volume   97 fL ()  


 


Mean Corpuscular Hemoglobin   32 pg (25-35)  


 


Mean Corpuscular Hemoglobin


Concent 


 


 33 g/dL


(31-37) 





 


Red Cell Distribution Width


 


 


 18.6 %


(11.5-14.5) 





 


Platelet Count


 


 


 433 x10^3/uL


(140-400) 





 


Neutrophils (%) (Auto)   70 % (31-73)  


 


Lymphocytes (%) (Auto)   16 % (24-48)  


 


Monocytes (%) (Auto)   13 % (0-9)  


 


Eosinophils (%) (Auto)   1 % (0-3)  


 


Basophils (%) (Auto)   0 % (0-3)  


 


Neutrophils # (Auto)


 


 


 4.0 x10^3/uL


(1.8-7.7) 





 


Lymphocytes # (Auto)


 


 


 0.9 x10^3/uL


(1.0-4.8) 





 


Monocytes # (Auto)


 


 


 0.8 x10^3/uL


(0.0-1.1) 





 


Eosinophils # (Auto)


 


 


 0.0 x10^3/uL


(0.0-0.7) 





 


Basophils # (Auto)


 


 


 0.0 x10^3/uL


(0.0-0.2) 





 


Sodium Level


 


 


 138 mmol/L


(136-145) 





 


Potassium Level


 


 


 3.9 mmol/L


(3.5-5.1) 





 


Chloride Level


 


 


 101 mmol/L


() 





 


Carbon Dioxide Level


 


 


 29 mmol/L


(21-32) 





 


Anion Gap   8 (6-14)  


 


Blood Urea Nitrogen


 


 


 59 mg/dL


(7-20) 





 


Creatinine


 


 


 1.5 mg/dL


(0.6-1.0) 





 


Estimated GFR


(Cockcroft-Gault) 


 


 36.9 


 





 


BUN/Creatinine Ratio   39 (6-20)  


 


Glucose Level


 


 


 139 mg/dL


(70-99) 





 


Calcium Level


 


 


 8.9 mg/dL


(8.5-10.1) 





 


Phosphorus Level


 


 


 3.9 mg/dL


(2.6-4.7) 





 


Magnesium Level


 


 


 2.0 mg/dL


(1.8-2.4) 





 


Total Bilirubin


 


 


 0.6 mg/dL


(0.2-1.0) 





 


Aspartate Amino Transf


(AST/SGOT) 


 


 31 U/L (15-37) 


 





 


Alanine Aminotransferase


(ALT/SGPT) 


 


 16 U/L (14-59) 


 





 


Alkaline Phosphatase


 


 


 72 U/L


() 





 


Total Protein


 


 


 6.1 g/dL


(6.4-8.2) 





 


Albumin


 


 


 3.1 g/dL


(3.4-5.0) 





 


Albumin/Globulin Ratio   1.0 (1.0-1.7)  


 


Hepatitis B Surface Antigen


 


 


 Nonreactive


(Nonreactive) 











Laboratory Tests








Test


 4/14/20


14:37 4/14/20


17:27 4/14/20


23:34 4/15/20


05:41


 


Glucose (Fingerstick)


 153 mg/dL


(70-99) 205 mg/dL


(70-99) 167 mg/dL


(70-99) 130 mg/dL


(70-99)


 


Test


 4/15/20


05:45 


 


 





 


White Blood Count


 5.8 x10^3/uL


(4.0-11.0) 


 


 





 


Red Blood Count


 2.61 x10^6/uL


(3.50-5.40) 


 


 





 


Hemoglobin


 8.2 g/dL


(12.0-15.5) 


 


 





 


Hematocrit


 25.2 %


(36.0-47.0) 


 


 





 


Mean Corpuscular Volume 97 fL ()    


 


Mean Corpuscular Hemoglobin 32 pg (25-35)    


 


Mean Corpuscular Hemoglobin


Concent 33 g/dL


(31-37) 


 


 





 


Red Cell Distribution Width


 18.6 %


(11.5-14.5) 


 


 





 


Platelet Count


 433 x10^3/uL


(140-400) 


 


 





 


Neutrophils (%) (Auto) 70 % (31-73)    


 


Lymphocytes (%) (Auto) 16 % (24-48)    


 


Monocytes (%) (Auto) 13 % (0-9)    


 


Eosinophils (%) (Auto) 1 % (0-3)    


 


Basophils (%) (Auto) 0 % (0-3)    


 


Neutrophils # (Auto)


 4.0 x10^3/uL


(1.8-7.7) 


 


 





 


Lymphocytes # (Auto)


 0.9 x10^3/uL


(1.0-4.8) 


 


 





 


Monocytes # (Auto)


 0.8 x10^3/uL


(0.0-1.1) 


 


 





 


Eosinophils # (Auto)


 0.0 x10^3/uL


(0.0-0.7) 


 


 





 


Basophils # (Auto)


 0.0 x10^3/uL


(0.0-0.2) 


 


 





 


Sodium Level


 138 mmol/L


(136-145) 


 


 





 


Potassium Level


 3.9 mmol/L


(3.5-5.1) 


 


 





 


Chloride Level


 101 mmol/L


() 


 


 





 


Carbon Dioxide Level


 29 mmol/L


(21-32) 


 


 





 


Anion Gap 8 (6-14)    


 


Blood Urea Nitrogen


 59 mg/dL


(7-20) 


 


 





 


Creatinine


 1.5 mg/dL


(0.6-1.0) 


 


 





 


Estimated GFR


(Cockcroft-Gault) 36.9 


 


 


 





 


BUN/Creatinine Ratio 39 (6-20)    


 


Glucose Level


 139 mg/dL


(70-99) 


 


 





 


Calcium Level


 8.9 mg/dL


(8.5-10.1) 


 


 





 


Phosphorus Level


 3.9 mg/dL


(2.6-4.7) 


 


 





 


Magnesium Level


 2.0 mg/dL


(1.8-2.4) 


 


 





 


Total Bilirubin


 0.6 mg/dL


(0.2-1.0) 


 


 





 


Aspartate Amino Transf


(AST/SGOT) 31 U/L (15-37) 


 


 


 





 


Alanine Aminotransferase


(ALT/SGPT) 16 U/L (14-59) 


 


 


 





 


Alkaline Phosphatase


 72 U/L


() 


 


 





 


Total Protein


 6.1 g/dL


(6.4-8.2) 


 


 





 


Albumin


 3.1 g/dL


(3.4-5.0) 


 


 





 


Albumin/Globulin Ratio 1.0 (1.0-1.7)    


 


Hepatitis B Surface Antigen


 Nonreactive


(Nonreactive) 


 


 











Problem List


Problems


Medical Problems:


(1) Acute pancreatitis


Status: Acute  





(2) Cholelithiasis


Status: Acute  








Assessment/Plan


severe pancreatitis


reviewed CT, essentially remains severe, but stable


no surgical plans (except eventual cholecystectomy)


cautiously hopeful for long term progress


will ask speech to evaluate and ultimately consider some PO


d/w hospitalist, d/w pt's daughter via phone.











GAMAL ZHOU MD             Apr 15, 2020 09:32

## 2020-04-15 NOTE — PDOC
PROGRESS NOTES


Assessment


Problems


Medical Problems:


(1) Acute pancreatitis


Status: Acute  





(2) Cholelithiasis


Status: Acute  





Respiratory failure.


Seizure.


Metabolic encephalopathy.


Fevers.


Metabolic acidosis.


Diffuse pulmonary infiltrate.


Pleural effusion.


Pancreatitis, necrotizing.


Gallstone.


Leukocytosis.


Lymphopenia.


Electrolytes imbalances.


Hyperglycemia.


DM.


HTN.


HLD.


Anemia.


Abnormal CXR.


Obesity.


Plan


Keppra if she has further seizures.


Treat medical diseases.


Subjective


None


Objective





Vital Signs








  Date Time  Temp Pulse Resp B/P (MAP) Pulse Ox O2 Delivery O2 Flow Rate FiO2


 


4/15/20 09:00  88 19 154/81 (105) 99 Ventilator  


 


4/15/20 08:00 99.6       





 99.6       


 


4/15/20 01:42       6.0 














Intake and Output 


 


 4/15/20





 07:00


 


Intake Total 3415.28 ml


 


Output Total 335 ml


 


Balance 3080.28 ml


 


 


 


Intake Oral 0 ml


 


IV Total 3415.28 ml


 


Output Urine Total 285 ml


 


Gastric Drainage Total 50 ml








PHYSICAL EXAM


Alert.  Tracheostomy in place. Mouths replies. Tracks with her eyes, moves 

extremities to commands


PERRL.


EOMI.


CN: no focal findings.


Muscle tone: normal.


Muscle strength: Moves all extremities, no better than 2/5 strength


DTR: 1+


Plantar reflex: Silent


Gait: not examined in bed.


Sensory exam: no abnormal findings.


No cerebellar signs elicited.


Review of Relevant


I have reviewed the following items josy (where applicable) has been applied.


Labs





Laboratory Tests








Test


 4/13/20


14:11 4/13/20


15:00 4/13/20


18:26 4/14/20


01:07


 


Glucose (Fingerstick)


 151 mg/dL


(70-99) 


 213 mg/dL


(70-99) 174 mg/dL


(70-99)


 


O2 Saturation  94 % (92-99)   


 


Arterial Blood pH


 


 7.46


(7.35-7.45) 


 





 


Arterial Blood pCO2 at


Patient Temp 


 38 mmHg


(35-46) 


 





 


Arterial Blood pO2 at Patient


Temp 


 74 mmHg


() 


 





 


Arterial Blood HCO3


 


 26 mmol/L


(21-28) 


 





 


Arterial Blood Base Excess


 


 2 mmol/L


(-3-3) 


 





 


FiO2  35   


 


Test


 4/14/20


06:39 4/14/20


09:00 4/14/20


14:37 4/14/20


17:27


 


Glucose (Fingerstick)


 157 mg/dL


(70-99) 


 153 mg/dL


(70-99) 205 mg/dL


(70-99)


 


White Blood Count


 


 9.8 x10^3/uL


(4.0-11.0) 


 





 


Red Blood Count


 


 2.87 x10^6/uL


(3.50-5.40) 


 





 


Hemoglobin


 


 9.2 g/dL


(12.0-15.5) 


 





 


Hematocrit


 


 27.9 %


(36.0-47.0) 


 





 


Mean Corpuscular Volume  97 fL ()   


 


Mean Corpuscular Hemoglobin  32 pg (25-35)   


 


Mean Corpuscular Hemoglobin


Concent 


 33 g/dL


(31-37) 


 





 


Red Cell Distribution Width


 


 19.2 %


(11.5-14.5) 


 





 


Platelet Count


 


 380 x10^3/uL


(140-400) 


 





 


Sodium Level


 


 137 mmol/L


(136-145) 


 





 


Potassium Level


 


 3.7 mmol/L


(3.5-5.1) 


 





 


Chloride Level


 


 98 mmol/L


() 


 





 


Carbon Dioxide Level


 


 29 mmol/L


(21-32) 


 





 


Anion Gap  10 (6-14)   


 


Blood Urea Nitrogen


 


 64 mg/dL


(7-20) 


 





 


Creatinine


 


 1.7 mg/dL


(0.6-1.0) 


 





 


Estimated GFR


(Cockcroft-Gault) 


 31.9 


 


 





 


Glucose Level


 


 166 mg/dL


(70-99) 


 





 


Calcium Level


 


 9.0 mg/dL


(8.5-10.1) 


 





 


Test


 4/14/20


23:34 4/15/20


05:41 4/15/20


05:45 





 


Glucose (Fingerstick)


 167 mg/dL


(70-99) 130 mg/dL


(70-99) 


 





 


White Blood Count


 


 


 5.8 x10^3/uL


(4.0-11.0) 





 


Red Blood Count


 


 


 2.61 x10^6/uL


(3.50-5.40) 





 


Hemoglobin


 


 


 8.2 g/dL


(12.0-15.5) 





 


Hematocrit


 


 


 25.2 %


(36.0-47.0) 





 


Mean Corpuscular Volume   97 fL ()  


 


Mean Corpuscular Hemoglobin   32 pg (25-35)  


 


Mean Corpuscular Hemoglobin


Concent 


 


 33 g/dL


(31-37) 





 


Red Cell Distribution Width


 


 


 18.6 %


(11.5-14.5) 





 


Platelet Count


 


 


 433 x10^3/uL


(140-400) 





 


Neutrophils (%) (Auto)   70 % (31-73)  


 


Lymphocytes (%) (Auto)   16 % (24-48)  


 


Monocytes (%) (Auto)   13 % (0-9)  


 


Eosinophils (%) (Auto)   1 % (0-3)  


 


Basophils (%) (Auto)   0 % (0-3)  


 


Neutrophils # (Auto)


 


 


 4.0 x10^3/uL


(1.8-7.7) 





 


Lymphocytes # (Auto)


 


 


 0.9 x10^3/uL


(1.0-4.8) 





 


Monocytes # (Auto)


 


 


 0.8 x10^3/uL


(0.0-1.1) 





 


Eosinophils # (Auto)


 


 


 0.0 x10^3/uL


(0.0-0.7) 





 


Basophils # (Auto)


 


 


 0.0 x10^3/uL


(0.0-0.2) 





 


Sodium Level


 


 


 138 mmol/L


(136-145) 





 


Potassium Level


 


 


 3.9 mmol/L


(3.5-5.1) 





 


Chloride Level


 


 


 101 mmol/L


() 





 


Carbon Dioxide Level


 


 


 29 mmol/L


(21-32) 





 


Anion Gap   8 (6-14)  


 


Blood Urea Nitrogen


 


 


 59 mg/dL


(7-20) 





 


Creatinine


 


 


 1.5 mg/dL


(0.6-1.0) 





 


Estimated GFR


(Cockcroft-Gault) 


 


 36.9 


 





 


BUN/Creatinine Ratio   39 (6-20)  


 


Glucose Level


 


 


 139 mg/dL


(70-99) 





 


Calcium Level


 


 


 8.9 mg/dL


(8.5-10.1) 





 


Phosphorus Level


 


 


 3.9 mg/dL


(2.6-4.7) 





 


Magnesium Level


 


 


 2.0 mg/dL


(1.8-2.4) 





 


Total Bilirubin


 


 


 0.6 mg/dL


(0.2-1.0) 





 


Aspartate Amino Transf


(AST/SGOT) 


 


 31 U/L (15-37) 


 





 


Alanine Aminotransferase


(ALT/SGPT) 


 


 16 U/L (14-59) 


 





 


Alkaline Phosphatase


 


 


 72 U/L


() 





 


Total Protein


 


 


 6.1 g/dL


(6.4-8.2) 





 


Albumin


 


 


 3.1 g/dL


(3.4-5.0) 





 


Albumin/Globulin Ratio   1.0 (1.0-1.7)  


 


Hepatitis B Surface Antigen


 


 


 Nonreactive


(Nonreactive) 











Laboratory Tests








Test


 4/14/20


14:37 4/14/20


17:27 4/14/20


23:34 4/15/20


05:41


 


Glucose (Fingerstick)


 153 mg/dL


(70-99) 205 mg/dL


(70-99) 167 mg/dL


(70-99) 130 mg/dL


(70-99)


 


Test


 4/15/20


05:45 


 


 





 


White Blood Count


 5.8 x10^3/uL


(4.0-11.0) 


 


 





 


Red Blood Count


 2.61 x10^6/uL


(3.50-5.40) 


 


 





 


Hemoglobin


 8.2 g/dL


(12.0-15.5) 


 


 





 


Hematocrit


 25.2 %


(36.0-47.0) 


 


 





 


Mean Corpuscular Volume 97 fL ()    


 


Mean Corpuscular Hemoglobin 32 pg (25-35)    


 


Mean Corpuscular Hemoglobin


Concent 33 g/dL


(31-37) 


 


 





 


Red Cell Distribution Width


 18.6 %


(11.5-14.5) 


 


 





 


Platelet Count


 433 x10^3/uL


(140-400) 


 


 





 


Neutrophils (%) (Auto) 70 % (31-73)    


 


Lymphocytes (%) (Auto) 16 % (24-48)    


 


Monocytes (%) (Auto) 13 % (0-9)    


 


Eosinophils (%) (Auto) 1 % (0-3)    


 


Basophils (%) (Auto) 0 % (0-3)    


 


Neutrophils # (Auto)


 4.0 x10^3/uL


(1.8-7.7) 


 


 





 


Lymphocytes # (Auto)


 0.9 x10^3/uL


(1.0-4.8) 


 


 





 


Monocytes # (Auto)


 0.8 x10^3/uL


(0.0-1.1) 


 


 





 


Eosinophils # (Auto)


 0.0 x10^3/uL


(0.0-0.7) 


 


 





 


Basophils # (Auto)


 0.0 x10^3/uL


(0.0-0.2) 


 


 





 


Sodium Level


 138 mmol/L


(136-145) 


 


 





 


Potassium Level


 3.9 mmol/L


(3.5-5.1) 


 


 





 


Chloride Level


 101 mmol/L


() 


 


 





 


Carbon Dioxide Level


 29 mmol/L


(21-32) 


 


 





 


Anion Gap 8 (6-14)    


 


Blood Urea Nitrogen


 59 mg/dL


(7-20) 


 


 





 


Creatinine


 1.5 mg/dL


(0.6-1.0) 


 


 





 


Estimated GFR


(Cockcroft-Gault) 36.9 


 


 


 





 


BUN/Creatinine Ratio 39 (6-20)    


 


Glucose Level


 139 mg/dL


(70-99) 


 


 





 


Calcium Level


 8.9 mg/dL


(8.5-10.1) 


 


 





 


Phosphorus Level


 3.9 mg/dL


(2.6-4.7) 


 


 





 


Magnesium Level


 2.0 mg/dL


(1.8-2.4) 


 


 





 


Total Bilirubin


 0.6 mg/dL


(0.2-1.0) 


 


 





 


Aspartate Amino Transf


(AST/SGOT) 31 U/L (15-37) 


 


 


 





 


Alanine Aminotransferase


(ALT/SGPT) 16 U/L (14-59) 


 


 


 





 


Alkaline Phosphatase


 72 U/L


() 


 


 





 


Total Protein


 6.1 g/dL


(6.4-8.2) 


 


 





 


Albumin


 3.1 g/dL


(3.4-5.0) 


 


 





 


Albumin/Globulin Ratio 1.0 (1.0-1.7)    


 


Hepatitis B Surface Antigen


 Nonreactive


(Nonreactive) 


 


 











Microbiology


4/12/20 Urine Culture - Final, Complete


          


4/12/20 Urine Culture Result 1 ([facility]) - Final, Complete


          


4/10/20 Blood Culture - Preliminary, Resulted


          NO GROWTH AFTER 4 DAYS


Medications





Current Medications


Sodium Chloride 1,000 ml @  1,000 mls/hr Q1H IV  Last administered on 3/16/20at 

03:00;  Start 3/16/20 at 03:00;  Stop 3/16/20 at 03:59;  Status DC


Ondansetron HCl (Zofran) 4 mg 1X  ONCE IVP  Last administered on 3/16/20at 

03:27;  Start 3/16/20 at 03:00;  Stop 3/16/20 at 03:01;  Status DC


Morphine Sulfate (Morphine Sulfate) 4 mg 1X  ONCE IV ;  Start 3/16/20 at 03:00; 

Stop 3/16/20 at 03:01;  Status Cancel


Ketorolac Tromethamine (Toradol 30mg Vial) 30 mg 1X  ONCE IV  Last administered 

on 3/16/20at 02:54;  Start 3/16/20 at 03:00;  Stop 3/16/20 at 03:01;  Status DC


Fentanyl Citrate (Fentanyl 2ml Vial) 25 mcg 1X  ONCE IVP  Last administered on 

3/16/20at 03:23;  Start 3/16/20 at 03:30;  Stop 3/16/20 at 03:31;  Status DC


Fentanyl Citrate (Fentanyl 2ml Vial) 100 mcg STK-MED ONCE .ROUTE ;  Start 

3/16/20 at 03:18;  Stop 3/16/20 at 03:18;  Status DC


Iohexol (Omnipaque 350 Mg/ml) 90 ml 1X  ONCE IV  Last administered on 3/16/20at 

03:25;  Start 3/16/20 at 03:30;  Stop 3/16/20 at 03:31;  Status DC


Info (CONTRAST GIVEN -- Rx MONITORING) 1 each PRN DAILY  PRN MC SEE COMMENTS;  

Start 3/16/20 at 03:30;  Stop 3/18/20 at 03:29;  Status DC


Hydromorphone HCl (Dilaudid) 0.5 mg 1X  ONCE IV  Last administered on 3/16/20at 

03:55;  Start 3/16/20 at 04:30;  Stop 3/16/20 at 04:32;  Status DC


Ondansetron HCl (Zofran) 4 mg PRN Q8HRS  PRN IV NAUSEA/VOMITING 1ST CHOICE;  

Start 3/16/20 at 05:00;  Stop 3/16/20 at 09:27;  Status DC


Morphine Sulfate (Morphine Sulfate) 2 mg PRN Q2HR  PRN IV SEVERE PAIN 7-10 Last 

administered on 3/17/20at 12:26;  Start 3/16/20 at 05:00;  Stop 3/17/20 at 

14:15;  Status DC


Sodium Chloride 1,000 ml @  125 mls/hr Q8H IV  Last administered on 3/16/20at 

20:56;  Start 3/16/20 at 05:00;  Stop 3/17/20 at 04:59;  Status DC


Hydromorphone HCl (Dilaudid) 0.5 mg PRN Q3HRS  PRN IV SEVERE PAIN 7-10 Last 

administered on 3/17/20at 10:06;  Start 3/16/20 at 05:00;  Stop 3/17/20 at 

12:01;  Status DC


Piperacillin Sod/ Tazobactam Sod 4.5 gm/Sodium Chloride 100 ml @  200 mls/hr 1X 

ONCE IV  Last administered on 3/16/20at 05:44;  Start 3/16/20 at 06:00;  Stop 

3/16/20 at 06:29;  Status DC


Ondansetron HCl (Zofran) 4 mg PRN Q4HRS  PRN IV NAUSEA/VOMITING 1ST CHOICE Last 

administered on 4/12/20at 09:56;  Start 3/16/20 at 09:30


Insulin Human Lispro (HumaLOG) 0-9 UNITS Q6HRS SQ  Last administered on 

4/14/20at 23:38;  Start 3/16/20 at 09:30


Dextrose (Dextrose 50%-Water Syringe) 12.5 gm PRN Q15MIN  PRN IV SEE COMMENTS;  

Start 3/16/20 at 09:30


Pantoprazole Sodium (PROTONIX VIAL for IV PUSH) 40 mg DAILYAC IVP  Last 

administered on 4/15/20at 07:38;  Start 3/16/20 at 11:30


Prochlorperazine Edisylate (Compazine) 10 mg PRN Q6HRS  PRN IV NAUSEA/VOMITING, 

2nd CHOICE Last administered on 3/17/20at 00:42;  Start 3/16/20 at 17:45


Atenolol (Tenormin) 100 mg DAILY PO ;  Start 3/17/20 at 09:00;  Stop 3/16/20 at 

20:08;  Status DC


Metoprolol Tartrate (Lopressor Vial) 2.5 mg Q6HRS IVP  Last administered on 

3/17/20at 05:51;  Start 3/16/20 at 20:15;  Stop 3/17/20 at 10:02;  Status DC


Metoprolol Tartrate (Lopressor Vial) 5 mg Q6HRS IVP  Last administered on 

3/26/20at 00:12;  Start 3/17/20 at 10:15;  Stop 3/28/20 at 08:48;  Status DC


Hydromorphone HCl (Dilaudid) 1 mg PRN Q3HRS  PRN IV SEVERE PAIN 7-10 Last 

administered on 3/23/20at 05:13;  Start 3/17/20 at 12:00;  Stop 3/31/20 at 

00:25;  Status DC


Lidocaine HCl (Buffered Lidocaine 1%) 3 ml STK-MED ONCE .ROUTE ;  Start 3/17/20 

at 12:55;  Stop 3/17/20 at 12:56;  Status DC


Albumin Human 500 ml @  125 mls/hr 1X  ONCE IV  Last administered on 3/17/20at 

14:33;  Start 3/17/20 at 14:30;  Stop 3/17/20 at 18:32;  Status DC


Norepinephrine Bitartrate 8 mg/ Dextrose 258 ml @  17.299 mls/ hr CONT  PRN IV 

PER PROTOCOL Last administered on 4/14/20at 12:48;  Start 3/17/20 at 15:30


Sodium Chloride 1,000 ml @  125 mls/hr Q8H IV  Last administered on 3/17/20at 

21:04;  Start 3/17/20 at 16:00;  Stop 3/18/20 at 02:42;  Status DC


Albumin Human 500 ml @  125 mls/hr PRN BID  PRN IV After every 2L NSS & BP < 

90mm Last administered on 4/1/20at 14:21;  Start 3/17/20 at 16:00


Iohexol (Omnipaque 300 Mg/ml) 60 ml 1X  ONCE IV  Last administered on 3/17/20at 

17:20;  Start 3/17/20 at 17:00;  Stop 3/17/20 at 17:01;  Status DC


Info (CONTRAST GIVEN -- Rx MONITORING) 1 each PRN DAILY  PRN MC SEE COMMENTS;  

Start 3/17/20 at 17:00;  Stop 3/19/20 at 16:59;  Status DC


Meropenem 1 gm/ Sodium Chloride 100 ml @  200 mls/hr Q8HRS IV  Last administered

on 3/18/20at 05:45;  Start 3/17/20 at 20:00;  Stop 3/18/20 at 08:48;  Status DC


Furosemide (Lasix) 40 mg 1X  ONCE IVP  Last administered on 3/17/20at 22:12;  

Start 3/17/20 at 22:30;  Stop 3/17/20 at 22:31;  Status DC


Calcium Chloride 1000 mg/Sodium Chloride 110 ml @  220 mls/hr 1X  ONCE IV  Last 

administered on 3/17/20at 22:11;  Start 3/17/20 at 22:30;  Stop 3/17/20 at 

22:59;  Status DC


Albuterol Sulfate (Ventolin Neb Soln) 2.5 mg 1X  ONCE NEB  Last administered on 

3/18/20at 00:56;  Start 3/17/20 at 22:30;  Stop 3/17/20 at 22:31;  Status DC


Insulin Human Regular (HumuLIN R VIAL) 5 unit 1X  ONCE IV  Last administered on 

3/17/20at 22:14;  Start 3/17/20 at 22:30;  Stop 3/17/20 at 22:31;  Status DC


Magnesium Sulfate 50 ml @ 25 mls/hr 1X  ONCE IV  Last administered on 3/18/20at 

02:57;  Start 3/18/20 at 03:00;  Stop 3/18/20 at 04:59;  Status DC


Calcium Gluconate 1000 mg/Sodium Chloride 110 ml @  220 mls/hr 1X  ONCE IV  Last

administered on 3/18/20at 02:46;  Start 3/18/20 at 03:00;  Stop 3/18/20 at 

03:29;  Status DC


Sodium Chloride 1,000 ml @  200 mls/hr Q5H IV  Last administered on 3/18/20at 

02:46;  Start 3/18/20 at 03:00;  Stop 3/18/20 at 10:21;  Status DC


Calcium Gluconate 1000 mg/Sodium Chloride 110 ml @  220 mls/hr 1X  ONCE IV  Last

administered on 3/18/20at 03:21;  Start 3/18/20 at 03:30;  Stop 3/18/20 at 

03:59;  Status DC


Sodium Bicarbonate 50 meq/Sodium Chloride 1,050 ml @  75 mls/hr Q14H IV  Last 

administered on 3/22/20at 21:10;  Start 3/18/20 at 07:30;  Stop 3/23/20 at 

10:28;  Status DC


Calcium Gluconate 2000 mg/Sodium Chloride 120 ml @  220 mls/hr 1X  ONCE IV  Last

administered on 3/18/20at 09:05;  Start 3/18/20 at 07:30;  Stop 3/18/20 at 

08:02;  Status DC


Lidocaine HCl (Xylocaine-Mpf 1% 2ml Vial) 2 ml STK-MED ONCE .ROUTE ;  Start 

3/18/20 at 08:47;  Stop 3/18/20 at 08:47;  Status DC


Meropenem 500 mg/ Sodium Chloride 50 ml @  100 mls/hr Q12HR IV  Last 

administered on 3/23/20at 21:01;  Start 3/18/20 at 18:00;  Stop 3/24/20 at 

07:58;  Status DC


Lidocaine HCl (Buffered Lidocaine 1%) 3 ml STK-MED ONCE .ROUTE ;  Start 3/18/20 

at 09:46;  Stop 3/18/20 at 09:46;  Status DC


Lidocaine HCl (Buffered Lidocaine 1%) 6 ml 1X  ONCE INJ  Last administered on 

3/18/20at 10:26;  Start 3/18/20 at 10:15;  Stop 3/18/20 at 10:16;  Status DC


Info (Tpn Per Pharmacy) 1 each PRN DAILY  PRN MC SEE COMMENTS Last administered 

on 4/14/20at 12:34;  Start 3/18/20 at 12:00


Sodium Chloride 1,000 ml @  1,000 mls/hr Q1H PRN IV hypotension;  Start 3/18/20 

at 12:07;  Stop 3/18/20 at 18:06;  Status DC


Diphenhydramine HCl (Benadryl) 25 mg 1X PRN  PRN IV ITCHING;  Start 3/18/20 at 

12:15;  Stop 3/19/20 at 12:14;  Status DC


Diphenhydramine HCl (Benadryl) 25 mg 1X PRN  PRN IV ITCHING;  Start 3/18/20 at 

12:15;  Stop 3/19/20 at 12:14;  Status DC


Sodium Chloride 1,000 ml @  400 mls/hr Q2H30M PRN IV PATENCY;  Start 3/18/20 at 

12:07;  Stop 3/19/20 at 00:06;  Status DC


Info (PHARMACY MONITORING -- do not chart) 1 each PRN DAILY  PRN MC SEE 

COMMENTS;  Start 3/18/20 at 12:15;  Stop 3/20/20 at 08:13;  Status DC


Sodium Chloride 90 meq/Calcium Gluconate 10 meq/ Multivitamins 10 ml/Chromium/ 

Copper/Manganese/ Seleni/Zn 1 ml/ Total Parenteral Nutrition/Amino 

Acids/Dextrose/ Fat Emulsion Intravenous 55.005 ml  @ 2.292 mls/hr TPN  CONT IV 

;  Start 3/18/20 at 22:00;  Stop 3/18/20 at 12:33;  Status DC


Info (Tpn Per Pharmacy) 1 each PRN DAILY  PRN MC SEE COMMENTS;  Start 3/18/20 at

12:30;  Status UNV


Sodium Chloride 90 meq/Calcium Gluconate 10 meq/ Multivitamins 10 ml/Chromium/ 

Copper/Manganese/ Seleni/Zn 0.5 ml/ Total Parenteral Nutrition/Amino 

Acids/Dextrose/ Fat Emulsion Intravenous 1,512 ml @  63 mls/hr TPN  CONT IV  

Last administered on 3/18/20at 22:06;  Start 3/18/20 at 22:00;  Stop 3/19/20 at 

21:59;  Status DC


Calcium Carbonate/ Glycine (Tums) 500 mg PRN AFTMEALHC  PRN PO INDIGESTION;  

Start 3/18/20 at 17:45


Calcium Gluconate (Calcium Gluconate) 2,000 mg 1X  ONCE IVP  Last administered 

on 3/19/20at 02:19;  Start 3/19/20 at 02:15;  Stop 3/19/20 at 02:16;  Status DC


Calcium Chloride 3000 mg/Sodium Chloride 1,030 ml @  50 mls/hr [id number] IV  Last 

administered on 3/21/20at 02:17;  Start 3/19/20 at 08:00;  Stop 3/21/20 at 

15:23;  Status DC


Lorazepam (Ativan Inj) 1 mg PRN Q4HRS  PRN IVP ANXIETY / AGITATION, 2nd choic 

Last administered on 4/14/20at 11:06;  Start 3/19/20 at 09:00


Sodium Chloride 1,000 ml @  1,000 mls/hr Q1H PRN IV hypotension;  Start 3/19/20 

at 08:56;  Stop 3/19/20 at 14:55;  Status DC


Albumin Human 200 ml @  200 mls/hr 1X PRN  PRN IV Hypotension;  Start 3/19/20 at

09:00;  Stop 3/19/20 at 14:59;  Status DC


Diphenhydramine HCl (Benadryl) 25 mg 1X PRN  PRN IV ITCHING;  Start 3/19/20 at 

09:00;  Stop 3/20/20 at 08:59;  Status DC


Diphenhydramine HCl (Benadryl) 25 mg 1X PRN  PRN IV ITCHING;  Start 3/19/20 at 

09:00;  Stop 3/20/20 at 08:59;  Status DC


Sodium Chloride 1,000 ml @  400 mls/hr Q2H30M PRN IV PATENCY;  Start 3/19/20 at 

08:56;  Stop 3/19/20 at 20:55;  Status DC


Info (PHARMACY MONITORING -- do not chart) 1 each PRN DAILY  PRN MC SEE 

COMMENTS;  Start 3/19/20 at 09:00;  Status UNV


Info (PHARMACY MONITORING -- do not chart) 1 each PRN DAILY  PRN MC SEE 

COMMENTS;  Start 3/19/20 at 09:00;  Stop 3/20/20 at 08:13;  Status DC


Digoxin (Lanoxin) 500 mcg 1X  ONCE IV  Last administered on 3/19/20at 10:04;  

Start 3/19/20 at 10:00;  Stop 3/19/20 at 10:01;  Status DC


Digoxin (Lanoxin) 125 mcg 1X  ONCE IV  Last administered on 3/19/20at 17:10;  

Start 3/19/20 at 18:00;  Stop 3/19/20 at 18:01;  Status DC


Magnesium Sulfate 100 ml @  25 mls/hr 1X  ONCE IV  Last administered on 

3/19/20at 12:48;  Start 3/19/20 at 13:00;  Stop 3/19/20 at 16:59;  Status DC


Sodium Chloride 90 meq/Magnesium Sulfate 10 meq/ Calcium Gluconate 20 meq/ 

Multivitamins 10 ml/Chromium/ Copper/Manganese/ Seleni/Zn 0.5 ml/ Total 

Parenteral Nutrition/Amino Acids/Dextrose/ Fat Emulsion Intravenous 1,512 ml @  

63 mls/hr TPN  CONT IV  Last administered on 3/19/20at 22:25;  Start 3/19/20 at 

22:00;  Stop 3/20/20 at 21:59;  Status DC


Sodium Chloride 1,000 ml @  1,000 mls/hr Q1H PRN IV hypotension;  Start 3/20/20 

at 08:05;  Stop 3/20/20 at 14:04;  Status DC


Albumin Human 200 ml @  200 mls/hr 1X  ONCE IV  Last administered on 3/20/20at 

08:57;  Start 3/20/20 at 08:15;  Stop 3/20/20 at 09:14;  Status DC


Diphenhydramine HCl (Benadryl) 25 mg 1X PRN  PRN IV ITCHING;  Start 3/20/20 at 

08:15;  Stop 3/21/20 at 08:14;  Status DC


Diphenhydramine HCl (Benadryl) 25 mg 1X PRN  PRN IV ITCHING;  Start 3/20/20 at 

08:15;  Stop 3/21/20 at 08:14;  Status DC


Sodium Chloride 1,000 ml @  400 mls/hr Q2H30M PRN IV PATENCY;  Start 3/20/20 at 

08:05;  Stop 3/20/20 at 20:04;  Status DC


Info (PHARMACY MONITORING -- do not chart) 1 each PRN DAILY  PRN MC SEE 

COMMENTS;  Start 3/20/20 at 08:15;  Stop 3/24/20 at 07:57;  Status DC


Sodium Chloride 90 meq/Potassium Chloride 15 meq/ Potassium Phosphate 10 mmol/ 

Magnesium Sulfate 10 meq/Calcium Gluconate 20 meq/ Multivitamins 10 ml/Chromium/

Copper/Manganese/ Seleni/Zn 0.5 ml/ Total Parenteral Nutrition/Amino 

Acids/Dextrose/ Fat Emulsion Intravenous 1,512 ml @  63 mls/hr TPN  CONT IV  

Last administered on 3/20/20at 21:01;  Start 3/20/20 at 22:00;  Stop 3/21/20 at 

21:59;  Status DC


Potassium Chloride/Water 100 ml @  100 mls/hr 1X  ONCE IV  Last administered on 

3/20/20at 14:09;  Start 3/20/20 at 14:00;  Stop 3/20/20 at 14:59;  Status DC


Benzocaine (Hurricaine One) 1 spray 1X  ONCE MM  Last administered on 3/20/20at 

16:38;  Start 3/20/20 at 14:30;  Stop 3/20/20 at 14:31;  Status DC


Lidocaine HCl (Glydo (Lidocaine) Jelly) 1 thomas 1X  ONCE MM  Last administered on 

3/20/20at 16:38;  Start 3/20/20 at 14:30;  Stop 3/20/20 at 14:31;  Status DC


Linezolid/Dextrose 300 ml @  300 mls/hr Q12HR IV  Last administered on 3/26/20at

21:04;  Start 3/20/20 at 20:00;  Stop 3/27/20 at 07:50;  Status DC


Acetaminophen (Tylenol) 650 mg PRN Q6HRS  PRN PO MILD PAIN / TEMP;  Start 

3/21/20 at 03:30;  Stop 3/21/20 at 03:36;  Status DC


Acetaminophen (Tylenol) 650 mg PRN Q6HRS  PRN PEG MILD PAIN / TEMP Last 

administered on 3/26/20at 07:35;  Start 3/21/20 at 03:36


Sodium Chloride 1,000 ml @  1,000 mls/hr Q1H PRN IV hypotension;  Start 3/21/20 

at 07:50;  Stop 3/21/20 at 13:49;  Status DC


Albumin Human 200 ml @  200 mls/hr 1X PRN  PRN IV Hypotension;  Start 3/21/20 at

08:00;  Stop 3/21/20 at 13:59;  Status DC


Sodium Chloride (Normal Saline Flush) 10 ml 1X PRN  PRN IV AP catheter pack;  

Start 3/21/20 at 08:00;  Stop 3/22/20 at 07:59;  Status DC


Sodium Chloride (Normal Saline Flush) 10 ml 1X PRN  PRN IV  catheter pack;  

Start 3/21/20 at 08:00;  Stop 3/22/20 at 07:59;  Status DC


Sodium Chloride 1,000 ml @  400 mls/hr Q2H30M PRN IV PATENCY;  Start 3/21/20 at 

07:50;  Stop 3/21/20 at 19:49;  Status DC


Info (PHARMACY MONITORING -- do not chart) 1 each PRN DAILY  PRN MC SEE 

COMMENTS;  Start 3/21/20 at 08:00;  Status UNV


Info (PHARMACY MONITORING -- do not chart) 1 each PRN DAILY  PRN MC SEE 

COMMENTS;  Start 3/21/20 at 08:00;  Stop 3/23/20 at 08:25;  Status DC


Sodium Chloride 90 meq/Potassium Chloride 15 meq/ Potassium Phosphate 10 mmol/ 

Magnesium Sulfate 10 meq/Calcium Gluconate 20 meq/ Multivitamins 10 ml/Chromium/

Copper/Manganese/ Seleni/Zn 0.5 ml/ Total Parenteral Nutrition/Amino 

Acids/Dextrose/ Fat Emulsion Intravenous 1,512 ml @  63 mls/hr TPN  CONT IV  

Last administered on 3/21/20at 20:57;  Start 3/21/20 at 22:00;  Stop 3/22/20 at 

21:59;  Status DC


Sodium Chloride 90 meq/Potassium Chloride 15 meq/ Potassium Phosphate 15 mmol/ 

Magnesium Sulfate 10 meq/Calcium Gluconate 20 meq/ Multivitamins 10 ml/Chromium/

Copper/Manganese/ Seleni/Zn 0.5 ml/ Total Parenteral Nutrition/Amino Acids/D

extrose/ Fat Emulsion Intravenous 1,512 ml @  63 mls/hr TPN  CONT IV ;  Start 

3/22/20 at 22:00;  Stop 3/22/20 at 14:16;  Status DC


Sodium Chloride 90 meq/Potassium Chloride 15 meq/ Potassium Phosphate 15 mmol/ 

Magnesium Sulfate 10 meq/Calcium Gluconate 20 meq/ Multivitamins 10 ml/Chromium/

Copper/Manganese/ Seleni/Zn 0.5 ml/ Total Parenteral Nutrition/Amino 

Acids/Dextrose/ Fat Emulsion Intravenous 1,200 ml @  50 mls/hr TPN  CONT IV ;  

Start 3/22/20 at 22:00;  Stop 3/22/20 at 14:17;  Status DC


Sodium Chloride 90 meq/Potassium Chloride 15 meq/ Potassium Phosphate 10 mmol/ 

Magnesium Sulfate 10 meq/Calcium Gluconate 20 meq/ Multivitamins 10 ml/Chromium/

Copper/Manganese/ Seleni/Zn 0.5 ml/ Total Parenteral Nutrition/Amino 

Acids/Dextrose/ Fat Emulsion Intravenous 1,200 ml @  50 mls/hr TPN  CONT IV  

Last administered on 3/22/20at 23:29;  Start 3/22/20 at 22:00;  Stop 3/23/20 at 

21:59;  Status DC


Sodium Chloride 1,000 ml @  1,000 mls/hr Q1H PRN IV hypotension;  Start 3/23/20 

at 07:28;  Stop 3/23/20 at 13:27;  Status DC


Albumin Human 200 ml @  200 mls/hr 1X  ONCE IV  Last administered on 3/23/20at 

08:51;  Start 3/23/20 at 07:30;  Stop 3/23/20 at 08:29;  Status DC


Diphenhydramine HCl (Benadryl) 25 mg 1X PRN  PRN IV ITCHING;  Start 3/23/20 at 

07:30;  Stop 3/24/20 at 07:29;  Status DC


Diphenhydramine HCl (Benadryl) 25 mg 1X PRN  PRN IV ITCHING;  Start 3/23/20 at 

07:30;  Stop 3/24/20 at 07:29;  Status DC


Sodium Chloride 1,000 ml @  400 mls/hr Q2H30M PRN IV PATENCY;  Start 3/23/20 at 

07:28;  Stop 3/23/20 at 19:27;  Status DC


Info (PHARMACY MONITORING -- do not chart) 1 each PRN DAILY  PRN MC SEE 

COMMENTS;  Start 3/23/20 at 07:30;  Stop 4/3/20 at 13:01;  Status DC


Metronidazole 100 ml @  100 mls/hr Q6HRS IV  Last administered on 4/8/20at 

06:26;  Start 3/23/20 at 08:30;  Stop 4/8/20 at 09:58;  Status DC


Micafungin Sodium 100 mg/Dextrose 100 ml @  100 mls/hr Q24H IV  Last 

administered on 4/15/20at 08:54;  Start 3/23/20 at 09:00


Propofol 0 ml @ As Directed STK-MED ONCE IV ;  Start 3/23/20 at 07:53;  Stop 

3/23/20 at 07:53;  Status DC


Etomidate (Amidate) 20 mg STK-MED ONCE IV ;  Start 3/23/20 at 07:53;  Stop 

3/23/20 at 07:54;  Status DC


Midazolam HCl (Versed) 5 mg STK-MED ONCE .ROUTE ;  Start 3/23/20 at 07:57;  Stop

3/23/20 at 07:57;  Status DC


Fentanyl Citrate 30 ml @ 0 mls/hr CONT  PRN IV SEE PROTOCOL Last administered on

4/15/20at 08:13;  Start 3/23/20 at 08:15


Artificial Tears (Artificial Tears) 1 drop PRN Q1HR  PRN OU DRY EYE, 1st choice;

 Start 3/23/20 at 08:15


Midazolam HCl 50 mg/Sodium Chloride 50 ml @ 0 mls/hr CONT  PRN IV SEE PROTOCOL 

Last administered on 3/26/20at 22:39;  Start 3/23/20 at 08:15;  Stop 3/28/20 at 

15:59;  Status DC


Etomidate (Amidate) 8 mg 1X  ONCE IV  Last administered on 3/23/20at 08:33;  

Start 3/23/20 at 08:30;  Stop 3/23/20 at 08:31;  Status DC


Succinylcholine Chloride (Anectine) 120 mg 1X  ONCE IV  Last administered on 

3/23/20at 08:34;  Start 3/23/20 at 08:30;  Stop 3/23/20 at 08:31;  Status DC


Midazolam HCl (Versed) 5 mg 1X  ONCE IV ;  Start 3/23/20 at 08:30;  Stop 3/23/20

at 08:31;  Status DC


Potassium Chloride 15 meq/ Bicarbonate Dialysis Soln w/ out KCl 5,007.5 ml  @ 

1,000 mls/ hr Q5H1M IV  Last administered on 3/24/20at 11:11;  Start 3/23/20 at 

12:00;  Stop 3/24/20 at 11:15;  Status DC


Potassium Chloride 15 meq/ Bicarbonate Dialysis Soln w/ out KCl 5,007.5 ml  @ 

1,000 mls/ hr Q5H1M IV  Last administered on 3/24/20at 11:12;  Start 3/23/20 at 

12:00;  Stop 3/24/20 at 11:17;  Status DC


Potassium Chloride 15 meq/ Bicarbonate Dialysis Soln w/ out KCl 5,007.5 ml  @ 

1,000 mls/ hr Q5H1M IV  Last administered on 3/24/20at 11:11;  Start 3/23/20 at 

12:00;  Stop 3/24/20 at 11:19;  Status DC


Sodium Chloride 90 meq/Potassium Chloride 15 meq/ Potassium Phosphate 10 mmol/ 

Magnesium Sulfate 10 meq/Calcium Gluconate 20 meq/ Multivitamins 10 ml/Chromium/

Copper/Manganese/ Seleni/Zn 0.5 ml/ Total Parenteral Nutrition/Amino 

Acids/Dextrose/ Fat Emulsion Intravenous 1,400 ml @  58.333 mls/ hr TPN  CONT IV

 Last administered on 3/23/20at 21:42;  Start 3/23/20 at 22:00;  Stop 3/24/20 at

21:59;  Status DC


Heparin Sodium (Porcine) (Heparin Sodium) 5,000 unit Q8HRS SQ  Last administered

on 3/28/20at 05:55;  Start 3/23/20 at 15:00;  Stop 3/28/20 at 13:28;  Status DC


Meropenem 500 mg/ Sodium Chloride 50 ml @  100 mls/hr Q6HRS IV  Last 

administered on 3/25/20at 06:00;  Start 3/24/20 at 09:00;  Stop 3/25/20 at 

07:29;  Status DC


Potassium Phosphate 20 mmol/ Sodium Chloride 106.6667 ml @  51.667 m... 1X  ONCE

IV  Last administered on 3/24/20at 11:22;  Start 3/24/20 at 10:15;  Stop 3/24/20

at 12:18;  Status DC


Acetaminophen (Tylenol Supp) 650 mg PRN Q6HRS  PRN [place] MILD PAIN / TEMP Last 

administered on 4/14/20at 08:01;  Start 3/24/20 at 10:30


Potassium Chloride/Water 100 ml @  100 mls/hr Q1H IV  Last administered on 

3/24/20at 12:12;  Start 3/24/20 at 11:00;  Stop 3/24/20 at 12:59;  Status DC


Potassium Chloride 20 meq/ Bicarbonate Dialysis Soln w/ out KCl 5,010 ml @  

1,000 mls/hr Q5H1M IV  Last administered on 3/25/20at 08:48;  Start 3/24/20 at 

12:00;  Stop 3/25/20 at 13:03;  Status DC


Potassium Chloride 20 meq/ Bicarbonate Dialysis Soln w/ out KCl 5,010 ml @  

1,000 mls/hr Q5H1M IV  Last administered on 3/29/20at 14:52;  Start 3/24/20 at 

11:30;  Stop 3/29/20 at 19:59;  Status DC


Potassium Chloride 20 meq/ Bicarbonate Dialysis Soln w/ out KCl 5,010 ml @  

1,000 mls/hr Q5H1M IV  Last administered on 3/29/20at 14:53;  Start 3/24/20 at 

11:30;  Stop 3/29/20 at 19:59;  Status DC


Sodium Chloride 90 meq/Potassium Chloride 15 meq/ Potassium Phosphate 15 mmol/ 

Magnesium Sulfate 10 meq/Calcium Gluconate 15 meq/ Multivitamins 10 ml/Chromium/

Copper/Manganese/ Seleni/Zn 0.5 ml/ Total Parenteral Nutrition/Amino 

Acids/Dextrose/ Fat Emulsion Intravenous 1,400 ml @  58.333 mls/ hr TPN  CONT IV

 Last administered on 3/24/20at 22:17;  Start 3/24/20 at 22:00;  Stop 3/25/20 at

21:59;  Status DC


Cefepime HCl (Maxipime) 2 gm Q12HR IVP  Last administered on 4/7/20at 20:56;  

Start 3/25/20 at 09:00;  Stop 4/8/20 at 09:58;  Status DC


Daptomycin 500 mg/ Sodium Chloride 50 ml @  100 mls/hr Q48H IV  Last 

administered on 4/10/20at 09:57;  Start 3/25/20 at 08:30;  Stop 4/10/20 at 10:

07;  Status DC


Lidocaine HCl (Buffered Lidocaine 1%) 3 ml 1X  ONCE INJ  Last administered on 

3/25/20at 10:27;  Start 3/25/20 at 10:30;  Stop 3/25/20 at 10:31;  Status DC


Potassium Phosphate 20 mmol/ Sodium Chloride 106.6667 ml @  51.667 m... 1X  ONCE

IV  Last administered on 3/25/20at 12:51;  Start 3/25/20 at 13:00;  Stop 3/25/20

at 15:03;  Status DC


Sodium Chloride 90 meq/Potassium Chloride 15 meq/ Potassium Phosphate 18 mmol/ 

Magnesium Sulfate 8 meq/Calcium Gluconate 15 meq/ Multivitamins 10 ml/Chromium/ 

Copper/Manganese/ Seleni/Zn 0.5 ml/ Total Parenteral Nutrition/Amino 

Acids/Dextrose/ Fat Emulsion Intravenous 1,400 ml @  58.333 mls/ hr TPN  CONT IV

 Last administered on 3/25/20at 22:16;  Start 3/25/20 at 22:00;  Stop 3/26/20 at

21:59;  Status DC


Potassium Chloride 20 meq/ Bicarbonate Dialysis Soln w/ out KCl 5,010 ml @  

1,000 mls/hr Q5H1M IV  Last administered on 3/29/20at 14:54;  Start 3/25/20 at 

16:00;  Stop 3/29/20 at 19:59;  Status DC


Multi-Ingred Cream/Lotion/Oil/ Oint (Artificial Tears Eye Ointment) 1 thomas PRN 

Q1HR  PRN OU DRY EYE, 2nd choice Last administered on 4/13/20at 08:19;  Start 

3/25/20 at 17:30


Sodium Chloride 90 meq/Potassium Chloride 15 meq/ Potassium Phosphate 18 mmol/ 

Magnesium Sulfate 8 meq/Calcium Gluconate 15 meq/ Multivitamins 10 ml/Chromium/ 

Copper/Manganese/ Seleni/Zn 0.5 ml/ Total Parenteral Nutrition/Amino 

Acids/Dextrose/ Fat Emulsion Intravenous 1,400 ml @  58.333 mls/ hr TPN  CONT IV

 Last administered on 3/26/20at 22:00;  Start 3/26/20 at 22:00;  Stop 3/27/20 at

21:59;  Status DC


Albumin Human 500 ml @  125 mls/hr 1X  ONCE IV ;  Start 3/26/20 at 14:15;  Stop 

3/26/20 at 18:14;  Status DC


Sodium Chloride 90 meq/Potassium Chloride 15 meq/ Potassium Phosphate 18 mmol/ 

Magnesium Sulfate 8 meq/Calcium Gluconate 15 meq/ Multivitamins 10 ml/Chromium/ 

Copper/Manganese/ Seleni/Zn 0.5 ml/ Insulin Human Regular 10 unit/ Total 

Parenteral Nutrition/Amino Acids/Dextrose/ Fat Emulsion Intravenous 1,400 ml @  

58.333 mls/ hr TPN  CONT IV  Last administered on 3/27/20at 21:43;  Start 

3/27/20 at 22:00;  Stop 3/28/20 at 21:59;  Status DC


Lidocaine HCl (Buffered Lidocaine 1%) 3 ml STK-MED ONCE .ROUTE ;  Start 3/25/20 

at 10:00;  Stop 3/27/20 at 13:57;  Status DC


Midazolam HCl 100 mg/Sodium Chloride 100 ml @ 7 mls/hr CONT  PRN IV SEE PROTOCOL

Last administered on 4/8/20at 15:35;  Start 3/28/20 at 16:00


Sodium Chloride 90 meq/Potassium Chloride 15 meq/ Potassium Phosphate 18 mmol/ 

Magnesium Sulfate 8 meq/Calcium Gluconate 15 meq/ Multivitamins 10 ml/Chromium/ 

Copper/Manganese/ Seleni/Zn 0.5 ml/ Insulin Human Regular 15 unit/ Total 

Parenteral Nutrition/Amino Acids/Dextrose/ Fat Emulsion Intravenous 1,400 ml @  

58.333 mls/ hr TPN  CONT IV  Last administered on 3/28/20at 20:34;  Start 

3/28/20 at 22:00;  Stop 3/29/20 at 21:59;  Status DC


Info ([facility] Electrolyte Protocol) 1 ea CONT PRN  PRN MC PER PROTOCOL;  Start 

3/29/20 at 13:15


Sodium Chloride 90 meq/Potassium Chloride 15 meq/ Potassium Phosphate 18 mmol/ 

Magnesium Sulfate 8 meq/Calcium Gluconate 15 meq/ Multivitamins 10 ml/Chromium/ 

Copper/Manganese/ Seleni/Zn 0.5 ml/ Insulin Human Regular 15 unit/ Total 

Parenteral Nutrition/Amino Acids/Dextrose/ Fat Emulsion Intravenous 1,400 ml @  

58.333 mls/ hr TPN  CONT IV  Last administered on 3/29/20at 22:05;  Start 

3/29/20 at 22:00;  Stop 3/30/20 at 21:59;  Status DC


Potassium Chloride 15 meq/ Bicarbonate Dialysis Soln w/ out KCl 5,007.5 ml  @ 

1,000 mls/ hr Q5H1M IV  Last administered on 4/1/20at 18:14;  Start 3/29/20 at 

20:00;  Stop 4/2/20 at 13:08;  Status DC


Potassium Chloride 15 meq/ Bicarbonate Dialysis Soln w/ out KCl 5,007.5 ml  @ 

1,000 mls/ hr Q5H1M IV  Last administered on 4/1/20at 18:14;  Start 3/29/20 at 

20:00;  Stop 4/2/20 at 13:08;  Status DC


Potassium Chloride 15 meq/ Bicarbonate Dialysis Soln w/ out KCl 5,007.5 ml  @ 

1,000 mls/ hr Q5H1M IV  Last administered on 4/1/20at 18:14;  Start 3/29/20 at 

20:00;  Stop 4/2/20 at 13:08;  Status DC


Iohexol (Omnipaque 240 Mg/ml) 30 ml 1X  ONCE PO  Last administered on 3/30/20at 

11:30;  Start 3/30/20 at 11:30;  Stop 3/30/20 at 11:33;  Status DC


Info (CONTRAST GIVEN -- Rx MONITORING) 1 each PRN DAILY  PRN MC SEE COMMENTS;  

Start 3/30/20 at 11:45;  Stop 4/1/20 at 11:44;  Status DC


Sodium Chloride 90 meq/Potassium Chloride 15 meq/ Potassium Phosphate 18 mmol/ 

Magnesium Sulfate 8 meq/Calcium Gluconate 15 meq/ Multivitamins 10 ml/Chromium/ 

Copper/Manganese/ Seleni/Zn 0.5 ml/ Insulin Human Regular 15 unit/ Total 

Parenteral Nutrition/Amino Acids/Dextrose/ Fat Emulsion Intravenous 1,400 ml @  

58.333 mls/ hr TPN  CONT IV  Last administered on 3/30/20at 21:47;  Start 

3/30/20 at 22:00;  Stop 3/31/20 at 21:59;  Status DC


Sodium Chloride 90 meq/Potassium Chloride 15 meq/ Potassium Phosphate 18 mmol/ 

Magnesium Sulfate 8 meq/Calcium Gluconate 15 meq/ Multivitamins 10 ml/Chromium/ 

Copper/Manganese/ Seleni/Zn 0.5 ml/ Insulin Human Regular 20 unit/ Total 

Parenteral Nutrition/Amino Acids/Dextrose/ Fat Emulsion Intravenous 1,400 ml @  

58.333 mls/ hr TPN  CONT IV  Last administered on 3/31/20at 21:36;  Start 

3/31/20 at 22:00;  Stop 4/1/20 at 21:59;  Status DC


Alteplase, Recombinant (Cathflo For Central Catheter Clearance) 1 mg 1X  ONCE 

INT CAT  Last administered on 3/31/20at 20:03;  Start 3/31/20 at 19:30;  Stop 

3/31/20 at 19:46;  Status DC


Alteplase, Recombinant (Cathflo For Central Catheter Clearance) 1 mg 1X  ONCE 

INT CAT  Last administered on 3/31/20at 22:05;  Start 3/31/20 at 22:00;  Stop 

3/31/20 at 22:01;  Status DC


Sodium Chloride 90 meq/Potassium Chloride 15 meq/ Potassium Phosphate 18 mmol/ 

Magnesium Sulfate 8 meq/Calcium Gluconate 15 meq/ Multivitamins 10 ml/Chromium/ 

Copper/Manganese/ Seleni/Zn 0.5 ml/ Insulin Human Regular 20 unit/ Total 

Parenteral Nutrition/Amino Acids/Dextrose/ Fat Emulsion Intravenous 1,400 ml @  

58.333 mls/ hr TPN  CONT IV  Last administered on 4/1/20at 21:30;  Start 4/1/20 

at 22:00;  Stop 4/2/20 at 21:59;  Status DC


Dexmedetomidine HCl 400 mcg/ Sodium Chloride 100 ml @ 0 mls/hr CONT  PRN IV 

ANXIETY / AGITATION Last administered on 4/15/20at 08:05;  Start 4/2/20 at 08:15


Sodium Chloride 500 ml @  500 mls/hr 1X PRN  PRN IV ELEVATED BP, SEE COMMENTS;  

Start 4/2/20 at 08:15


Atropine Sulfate (ATROPINE 0.5mg SYRINGE) 0.5 mg PRN Q5MIN  PRN IV SEE COMMENTS;

 Start 4/2/20 at 08:15


Furosemide (Lasix) 20 mg 1X  ONCE IVP  Last administered on 4/2/20at 08:19;  

Start 4/2/20 at 08:15;  Stop 4/2/20 at 08:16;  Status DC


Lidocaine HCl (Buffered Lidocaine 1%) 3 ml STK-MED ONCE .ROUTE ;  Start 4/2/20 

at 08:39;  Stop 4/2/20 at 08:39;  Status DC


Lidocaine HCl (Buffered Lidocaine 1%) 6 ml 1X  ONCE INJ  Last administered on 

4/2/20at 09:05;  Start 4/2/20 at 09:00;  Stop 4/2/20 at 09:06;  Status DC


Sodium Chloride 90 meq/Potassium Chloride 15 meq/ Potassium Phosphate 18 mmol/ 

Magnesium Sulfate 8 meq/Calcium Gluconate 15 meq/ Multivitamins 10 ml/Chromium/ 

Copper/Manganese/ Seleni/Zn 0.5 ml/ Insulin Human Regular 20 unit/ Total 

Parenteral Nutrition/Amino Acids/Dextrose/ Fat Emulsion Intravenous 1,400 ml @  

58.333 mls/ hr TPN  CONT IV  Last administered on 4/2/20at 22:45;  Start 4/2/20 

at 22:00;  Stop 4/3/20 at 21:59;  Status DC


Sodium Chloride 1,000 ml @  1,000 mls/hr Q1H PRN IV hypotension;  Start 4/3/20 

at 07:30;  Stop 4/3/20 at 13:29;  Status DC


Albumin Human 200 ml @  200 mls/hr 1X PRN  PRN IV Hypotension Last administered 

on 4/3/20at 09:36;  Start 4/3/20 at 07:30;  Stop 4/3/20 at 13:29;  Status DC


Sodium Chloride (Normal Saline Flush) 10 ml 1X PRN  PRN IV AP catheter pack;  

Start 4/3/20 at 07:30;  Stop 4/3/20 at 21:29;  Status DC


Sodium Chloride (Normal Saline Flush) 10 ml 1X PRN  PRN IV  catheter pack;  

Start 4/3/20 at 07:30;  Stop 4/4/20 at 07:29;  Status DC


Sodium Chloride 1,000 ml @  400 mls/hr Q2H30M PRN IV PATENCY;  Start 4/3/20 at 

07:30;  Stop 4/3/20 at 19:29;  Status DC


Info (PHARMACY MONITORING -- do not chart) 1 each PRN DAILY  PRN MC SEE 

COMMENTS;  Start 4/3/20 at 07:30;  Stop 4/3/20 at 13:02;  Status DC


Info (PHARMACY MONITORING -- do not chart) 1 each PRN DAILY  PRN MC SEE 

COMMENTS;  Start 4/3/20 at 07:30;  Stop 4/5/20 at 12:45;  Status DC


Sodium Chloride 90 meq/Potassium Chloride 15 meq/ Potassium Phosphate 10 mmol/ 

Magnesium Sulfate 8 meq/Calcium Gluconate 15 meq/ Multivitamins 10 ml/Chromium/ 

Copper/Manganese/ Seleni/Zn 0.5 ml/ Insulin Human Regular 25 unit/ Total 

Parenteral Nutrition/Amino Acids/Dextrose/ Fat Emulsion Intravenous 1,400 ml @  

58.333 mls/ hr TPN  CONT IV  Last administered on 4/3/20at 22:19;  Start 4/3/20 

at 22:00;  Stop 4/4/20 at 21:59;  Status DC


Heparin Sodium (Porcine) (Heparin Sodium) 5,000 unit Q12HR SQ  Last administered

on 4/15/20at 07:42;  Start 4/3/20 at 21:00


Ondansetron HCl (Zofran) 4 mg PRN Q6HRS  PRN IV NAUSEA/VOMITING;  Start 4/6/20 

at 07:00;  Stop 4/7/20 at 06:59;  Status DC


Fentanyl Citrate (Fentanyl 2ml Vial) 25 mcg PRN Q5MIN  PRN IV MILD PAIN 1-3;  

Start 4/6/20 at 07:00;  Stop 4/7/20 at 06:59;  Status DC


Fentanyl Citrate (Fentanyl 2ml Vial) 50 mcg PRN Q5MIN  PRN IV MODERATE TO SEVERE

PAIN;  Start 4/6/20 at 07:00;  Stop 4/7/20 at 06:59;  Status DC


Ringer's Solution 1,000 ml @  30 mls/hr Q24H IV ;  Start 4/6/20 at 07:00;  Stop 

4/6/20 at 18:59;  Status DC


Lidocaine HCl (Xylocaine-Mpf 1% 2ml Vial) 2 ml PRN 1X  PRN ID PRIOR TO IV START;

 Start 4/6/20 at 07:00;  Stop 4/7/20 at 06:59;  Status DC


Prochlorperazine Edisylate (Compazine) 5 mg PACU PRN  PRN IV NAUSEA, MRX1;  

Start 4/6/20 at 07:00;  Stop 4/7/20 at 06:59;  Status DC


Sodium Chloride 1,000 ml @  1,000 mls/hr Q1H PRN IV hypotension;  Start 4/4/20 

at 09:10;  Stop 4/4/20 at 15:09;  Status DC


Albumin Human 200 ml @  200 mls/hr 1X PRN  PRN IV Hypotension Last administered 

on 4/4/20at 10:10;  Start 4/4/20 at 09:15;  Stop 4/4/20 at 15:14;  Status DC


Sodium Chloride 1,000 ml @  400 mls/hr Q2H30M PRN IV PATENCY;  Start 4/4/20 at 

09:10;  Stop 4/4/20 at 21:09;  Status DC


Info (PHARMACY MONITORING -- do not chart) 1 each PRN DAILY  PRN MC SEE 

COMMENTS;  Start 4/4/20 at 09:15;  Stop 4/5/20 at 12:45;  Status DC


Info (PHARMACY MONITORING -- do not chart) 1 each PRN DAILY  PRN MC SEE 

COMMENTS;  Start 4/4/20 at 09:15;  Stop 4/5/20 at 12:45;  Status DC


Sodium Chloride 90 meq/Potassium Chloride 15 meq/ Potassium Phosphate 10 mmol/ 

Magnesium Sulfate 8 meq/Calcium Gluconate 15 meq/ Multivitamins 10 ml/Chromium/ 

Copper/Manganese/ Seleni/Zn 0.5 ml/ Insulin Human Regular 25 unit/ Total 

Parenteral Nutrition/Amino Acids/Dextrose/ Fat Emulsion Intravenous 1,400 ml @  

58.333 mls/ hr TPN  CONT IV  Last administered on 4/4/20at 22:10;  Start 4/4/20 

at 22:00;  Stop 4/5/20 at 21:59;  Status DC


Magnesium Sulfate 50 ml @ 25 mls/hr PRN DAILY  PRN IV for Mag < 1.7 on am labs; 

Start 4/5/20 at 09:15


Sodium Chloride 90 meq/Potassium Chloride 15 meq/ Potassium Phosphate 10 mmol/ 

Magnesium Sulfate 8 meq/Calcium Gluconate 15 meq/ Multivitamins 10 ml/Chromium/ 

Copper/Manganese/ Seleni/Zn 0.5 ml/ Insulin Human Regular 25 unit/ Total 

Parenteral Nutrition/Amino Acids/Dextrose/ Fat Emulsion Intravenous 1,400 ml @  

58.333 mls/ hr TPN  CONT IV  Last administered on 4/5/20at 21:20;  Start 4/5/20 

at 22:00;  Stop 4/6/20 at 21:59;  Status DC


Sodium Chloride 1,000 ml @  1,000 mls/hr Q1H PRN IV hypotension;  Start 4/5/20 

at 12:23;  Stop 4/5/20 at 18:22;  Status DC


Albumin Human 200 ml @  200 mls/hr 1X  ONCE IV  Last administered on 4/5/20at 

13:34;  Start 4/5/20 at 12:30;  Stop 4/5/20 at 13:29;  Status DC


Diphenhydramine HCl (Benadryl) 25 mg 1X PRN  PRN IV ITCHING;  Start 4/5/20 at 

12:30;  Stop 4/6/20 at 12:29;  Status DC


Diphenhydramine HCl (Benadryl) 25 mg 1X PRN  PRN IV ITCHING;  Start 4/5/20 at 

12:30;  Stop 4/6/20 at 12:29;  Status DC


Info (PHARMACY MONITORING -- do not chart) 1 each PRN DAILY  PRN MC SEE 

COMMENTS;  Start 4/5/20 at 12:30;  Status Cancel


Bupivacaine HCl/ Epinephrine Bitart (Sensorcain-Epi 0.5%-1:138951 Mpf) 30 ml 

STK-MED ONCE .ROUTE  Last administered on 4/6/20at 11:44;  Start 4/6/20 at 

11:00;  Stop 4/6/20 at 11:01;  Status DC


Cellulose (Surgicel Fibrillar 1x2) 1 each STK-MED ONCE .ROUTE ;  Start 4/6/20 at

11:00;  Stop 4/6/20 at 11:01;  Status DC


Sodium Chloride 90 meq/Potassium Chloride 15 meq/ Potassium Phosphate 10 mmol/ 

Magnesium Sulfate 12 meq/Calcium Gluconate 15 meq/ Multivitamins 10 ml/Chromium/

Copper/Manganese/ Seleni/Zn 0.5 ml/ Insulin Human Regular 25 unit/ Total 

Parenteral Nutrition/Amino Acids/Dextrose/ Fat Emulsion Intravenous 1,400 ml @  

58.333 mls/ hr TPN  CONT IV  Last administered on 4/6/20at 22:24;  Start 4/6/20 

at 22:00;  Stop 4/7/20 at 21:59;  Status DC


Propofol 20 ml @ As Directed STK-MED ONCE IV ;  Start 4/6/20 at 11:07;  Stop 

4/6/20 at 11:07;  Status DC


Cellulose (Surgicel Hemostat 4x8) 1 each STK-MED ONCE .ROUTE  Last administered 

on 4/6/20at 11:44;  Start 4/6/20 at 11:55;  Stop 4/6/20 at 11:56;  Status DC


Sevoflurane (Ultane) 60 ml STK-MED ONCE IH ;  Start 4/6/20 at 12:46;  Stop 

4/6/20 at 12:46;  Status DC


Sodium Chloride 1,000 ml @  1,000 mls/hr Q1H PRN IV hypotension;  Start 4/6/20 

at 13:51;  Stop 4/6/20 at 19:50;  Status DC


Albumin Human 200 ml @  200 mls/hr 1X PRN  PRN IV Hypotension Last administered 

on 4/6/20at 14:51;  Start 4/6/20 at 14:00;  Stop 4/6/20 at 19:59;  Status DC


Diphenhydramine HCl (Benadryl) 25 mg 1X PRN  PRN IV ITCHING;  Start 4/6/20 at 

14:00;  Stop 4/7/20 at 13:59;  Status DC


Diphenhydramine HCl (Benadryl) 25 mg 1X PRN  PRN IV ITCHING;  Start 4/6/20 at 

14:00;  Stop 4/7/20 at 13:59;  Status DC


Sodium Chloride 1,000 ml @  400 mls/hr Q2H30M PRN IV PATENCY;  Start 4/6/20 at 

13:51;  Stop 4/7/20 at 01:50;  Status DC


Info (PHARMACY MONITORING -- do not chart) 1 each PRN DAILY  PRN MC SEE 

COMMENTS;  Start 4/6/20 at 14:00;  Stop 4/9/20 at 08:16;  Status DC


Heparin Sodium (Porcine) (Hep Lock Adult) 500 unit STK-MED ONCE IVP ;  Start 

4/7/20 at 09:29;  Stop 4/7/20 at 09:30;  Status DC


Sodium Chloride 1,000 ml @  1,000 mls/hr Q1H PRN IV hypotension;  Start 4/7/20 

at 10:43;  Stop 4/7/20 at 16:42;  Status DC


Sodium Chloride 1,000 ml @  400 mls/hr Q2H30M PRN IV PATENCY;  Start 4/7/20 at 

10:43;  Stop 4/7/20 at 22:42;  Status DC


Info (PHARMACY MONITORING -- do not chart) 1 each PRN DAILY  PRN MC SEE 

COMMENTS;  Start 4/7/20 at 10:45;  Status UNV


Info (PHARMACY MONITORING -- do not chart) 1 each PRN DAILY  PRN MC SEE 

COMMENTS;  Start 4/7/20 at 10:45;  Status UNV


Sodium Chloride 90 meq/Potassium Chloride 15 meq/ Magnesium Sulfate 12 

meq/Calcium Gluconate 15 meq/ Multivitamins 10 ml/Chromium/ Copper/Manganese/ 

Seleni/Zn 0.5 ml/ Insulin Human Regular 25 unit/ Total Parenteral 

Nutrition/Amino Acids/Dextrose/ Fat Emulsion Intravenous 1,400 ml @  58.333 mls/

hr TPN  CONT IV  Last administered on 4/7/20at 22:13;  Start 4/7/20 at 22:00;  

Stop 4/8/20 at 21:59;  Status DC


Sodium Chloride 1,000 ml @  1,000 mls/hr Q1H PRN IV hypotension;  Start 4/8/20 

at 07:50;  Stop 4/8/20 at 13:49;  Status DC


Albumin Human 200 ml @  200 mls/hr 1X  ONCE IV ;  Start 4/8/20 at 08:00;  Stop 

4/8/20 at 08:53;  Status DC


Diphenhydramine HCl (Benadryl) 25 mg 1X PRN  PRN IV ITCHING;  Start 4/8/20 at 

08:00;  Stop 4/9/20 at 07:59;  Status DC


Diphenhydramine HCl (Benadryl) 25 mg 1X PRN  PRN IV ITCHING;  Start 4/8/20 at 

08:00;  Stop 4/9/20 at 07:59;  Status DC


Info (PHARMACY MONITORING -- do not chart) 1 each PRN DAILY  PRN MC SEE 

COMMENTS;  Start 4/8/20 at 08:00;  Stop 4/9/20 at 08:16;  Status DC


Albumin Human 50 ml @ 50 mls/hr 1X  ONCE IV ;  Start 4/8/20 at 08:53;  Stop 

4/8/20 at 08:56;  Status DC


Albumin Human 200 ml @  50 mls/hr PRN 1X  PRN IV HYPOTENSION Last administered 

on 4/14/20at 11:54;  Start 4/8/20 at 09:00


Meropenem 500 mg/ Sodium Chloride 50 ml @  100 mls/hr Q12H IV  Last administered

on 4/14/20at 21:54;  Start 4/8/20 at 10:00


Sodium Chloride 90 meq/Magnesium Sulfate 12 meq/ Calcium Gluconate 15 meq/ 

Multivitamins 10 ml/Chromium/ Copper/Manganese/ Seleni/Zn 0.5 ml/ Insulin Human 

Regular 25 unit/ Total Parenteral Nutrition/Amino Acids/Dextrose/ Fat Emulsion 

Intravenous 1,400 ml @  58.333 mls/ hr TPN  CONT IV  Last administered on 

4/8/20at 21:41;  Start 4/8/20 at 22:00;  Stop 4/9/20 at 21:59;  Status DC


Sodium Chloride 1,000 ml @  1,000 mls/hr Q1H PRN IV hypotension;  Start 4/9/20 

at 07:58;  Stop 4/9/20 at 13:57;  Status DC


Albumin Human 200 ml @  200 mls/hr 1X PRN  PRN IV Hypotension Last administered 

on 4/9/20at 09:30;  Start 4/9/20 at 08:00;  Stop 4/9/20 at 13:59;  Status DC


Sodium Chloride 1,000 ml @  400 mls/hr Q2H30M PRN IV PATENCY;  Start 4/9/20 at 

07:58;  Stop 4/9/20 at 19:57;  Status DC


Info (PHARMACY MONITORING -- do not chart) 1 each PRN DAILY  PRN MC SEE 

COMMENTS;  Start 4/9/20 at 08:00;  Status Cancel


Info (PHARMACY MONITORING -- do not chart) 1 each PRN DAILY  PRN MC SEE COM

MENTS;  Start 4/9/20 at 08:15;  Status UNV


Sodium Chloride 90 meq/Potassium Phosphate 5 mmol/ Magnesium Sulfate 12 

meq/Calcium Gluconate 15 meq/ Multivitamins 10 ml/Chromium/ Copper/Manganese/ 

Seleni/Zn 0.5 ml/ Insulin Human Regular 30 unit/ Total Parenteral 

Nutrition/Amino Acids/Dextrose/ Fat Emulsion Intravenous 1,400 ml @  58.333 mls/

hr TPN  CONT IV  Last administered on 4/9/20at 22:08;  Start 4/9/20 at 22:00;  

Stop 4/10/20 at 21:59;  Status DC


Linezolid/Dextrose 300 ml @  300 mls/hr Q12HR IV  Last administered on 4/15/20at

07:38;  Start 4/10/20 at 11:00


Sodium Chloride 90 meq/Potassium Phosphate 15 mmol/ Magnesium Sulfate 12 me

q/Calcium Gluconate 15 meq/ Multivitamins 10 ml/Chromium/ Copper/Manganese/ 

Seleni/Zn 0.5 ml/ Insulin Human Regular 30 unit/ Total Parenteral 

Nutrition/Amino Acids/Dextrose/ Fat Emulsion Intravenous 1,400 ml @  58.333 mls/

hr TPN  CONT IV  Last administered on 4/10/20at 21:49;  Start 4/10/20 at 22:00; 

Stop 4/11/20 at 21:59;  Status DC


Sodium Chloride 90 meq/Potassium Phosphate 15 mmol/ Magnesium Sulfate 12 

meq/Calcium Gluconate 15 meq/ Multivitamins 10 ml/Chromium/ Copper/Manganese/ 

Seleni/Zn 0.5 ml/ Insulin Human Regular 40 unit/ Total Parenteral 

Nutrition/Amino Acids/Dextrose/ Fat Emulsion Intravenous 1,400 ml @  58.333 mls/

hr TPN  CONT IV  Last administered on 4/11/20at 21:21;  Start 4/11/20 at 22:00; 

Stop 4/12/20 at 21:59;  Status DC


Sodium Chloride 1,000 ml @  1,000 mls/hr Q1H PRN IV hypotension;  Start 4/11/20 

at 13:26;  Stop 4/11/20 at 19:25;  Status DC


Albumin Human 200 ml @  200 mls/hr 1X PRN  PRN IV Hypotension Last administered 

on 4/11/20at 15:00;  Start 4/11/20 at 13:30;  Stop 4/11/20 at 19:29;  Status DC


Sodium Chloride (Normal Saline Flush) 10 ml 1X PRN  PRN IV AP catheter pack;  

Start 4/11/20 at 13:30;  Stop 4/12/20 at 13:29;  Status DC


Sodium Chloride (Normal Saline Flush) 10 ml 1X PRN  PRN IV  catheter pack;  

Start 4/11/20 at 13:30;  Stop 4/12/20 at 13:29;  Status DC


Sodium Chloride 1,000 ml @  400 mls/hr Q2H30M PRN IV PATENCY;  Start 4/11/20 at 

13:26;  Stop 4/12/20 at 01:25;  Status DC


Info (PHARMACY MONITORING -- do not chart) 1 each PRN DAILY  PRN MC SEE 

COMMENTS;  Start 4/11/20 at 13:30;  Stop 4/11/20 at 13:33;  Status DC


Info (PHARMACY MONITORING -- do not chart) 1 each PRN DAILY  PRN MC SEE 

COMMENTS;  Start 4/11/20 at 13:30;  Stop 4/11/20 at 13:34;  Status DC


Sodium Chloride 90 meq/Potassium Phosphate 19 mmol/ Magnesium Sulfate 12 meq

/Calcium Gluconate 15 meq/ Multivitamins 10 ml/Chromium/ Copper/Manganese/ 

Seleni/Zn 0.5 ml/ Insulin Human Regular 40 unit/ Total Parenteral 

Nutrition/Amino Acids/Dextrose/ Fat Emulsion Intravenous 1,400 ml @  58.333 mls/

hr TPN  CONT IV  Last administered on 4/12/20at 21:54;  Start 4/12/20 at 22:00; 

Stop 4/13/20 at 21:59;  Status DC


Sodium Chloride 1,000 ml @  1,000 mls/hr Q1H PRN IV hypotension;  Start 4/13/20 

at 09:35;  Stop 4/13/20 at 15:34;  Status DC


Albumin Human 200 ml @  200 mls/hr 1X PRN  PRN IV Hypotension;  Start 4/13/20 at

09:45;  Stop 4/13/20 at 15:44;  Status DC


Diphenhydramine HCl (Benadryl) 25 mg 1X PRN  PRN IV ITCHING;  Start 4/13/20 at 

09:45;  Stop 4/14/20 at 09:44;  Status DC


Diphenhydramine HCl (Benadryl) 25 mg 1X PRN  PRN IV ITCHING;  Start 4/13/20 at 

09:45;  Stop 4/14/20 at 09:44;  Status DC


Sodium Chloride 1,000 ml @  400 mls/hr Q2H30M PRN IV PATENCY;  Start 4/13/20 at 

09:35;  Stop 4/13/20 at 21:34;  Status DC


Info (PHARMACY MONITORING -- do not chart) 1 each PRN DAILY  PRN MC SEE COM

MENTS;  Start 4/13/20 at 09:45;  Status Cancel


Sodium Chloride 100 meq/Potassium Phosphate 19 mmol/ Magnesium Sulfate 12 

meq/Calcium Gluconate 15 meq/ Multivitamins 10 ml/Chromium/ Copper/Manganese/ 

Seleni/Zn 0.5 ml/ Insulin Human Regular 40 unit/ Potassium Chloride 20 meq/ 

Total Parenteral Nutrition/Amino Acids/Dextrose/ Fat Emulsion Intravenous 1,400 

ml @  58.333 mls/ hr TPN  CONT IV  Last administered on 4/13/20at 22:02;  Start 

4/13/20 at 22:00;  Stop 4/14/20 at 21:59;  Status DC


Furosemide (Lasix) 40 mg 1X  ONCE IVP  Last administered on 4/13/20at 14:39;  

Start 4/13/20 at 14:30;  Stop 4/13/20 at 14:31;  Status DC


Metronidazole 100 ml @  100 mls/hr Q8HRS IV  Last administered on 4/15/20at 

05:43;  Start 4/14/20 at 10:00


Sodium Chloride 1,000 ml @  1,000 mls/hr Q1H PRN IV hypotension;  Start 4/14/20 

at 08:00;  Stop 4/14/20 at 13:59;  Status DC


Albumin Human 200 ml @  200 mls/hr 1X PRN  PRN IV Hypotension;  Start 4/14/20 at

08:00;  Stop 4/14/20 at 13:59;  Status DC


Sodium Chloride 1,000 ml @  400 mls/hr Q2H30M PRN IV PATENCY;  Start 4/14/20 at 

08:00;  Stop 4/14/20 at 19:59;  Status DC


Info (PHARMACY MONITORING -- do not chart) 1 each PRN DAILY  PRN MC SEE 

COMMENTS;  Start 4/14/20 at 11:30;  Status UNV


Info (PHARMACY MONITORING -- do not chart) 1 each PRN DAILY  PRN MC SEE 

COMMENTS;  Start 4/14/20 at 11:30


Sodium Chloride 100 meq/Potassium Phosphate 19 mmol/ Magnesium Sulfate 12 

meq/Calcium Gluconate 15 meq/ Multivitamins 10 ml/Chromium/ Copper/Manganese/ 

Seleni/Zn 0.5 ml/ Insulin Human Regular 40 unit/ Potassium Chloride 20 meq/ 

Total Parenteral Nutrition/Amino Acids/Dextrose/ Fat Emulsion Intravenous 1,400 

ml @  58.333 mls/ hr TPN  CONT IV  Last administered on 4/14/20at 21:52;  Start 

4/14/20 at 22:00;  Stop 4/15/20 at 21:59


Sodium Chloride (Normal Saline Flush) 10 ml QSHIFT  PRN IV AFTER MEDS AND BLOOD 

DRAWS;  Start 4/14/20 at 15:00


Sodium Chloride (Normal Saline Flush) 10 ml PRN Q5MIN  PRN IV AFTER MEDS AND 

BLOOD DRAWS;  Start 4/14/20 at 15:00


Sodium Chloride (Normal Saline Flush) 20 ml PRN Q5MIN  PRN IV AFTER MEDS AND 

BLOOD DRAWS;  Start 4/14/20 at 15:00





Active Scripts


Active


Reported


Bisoprolol Fumarate 5 Mg Tablet 10 Mg PO DAILY


Vitals/I & O





Vital Sign - Last 24 Hours








 4/14/20 4/14/20 4/14/20 4/14/20





 10:52 11:50 13:30 13:35


 


Temp    98.2





    98.2


 


Pulse 134   126


 


Resp 36   31


 


B/P (MAP) 153/75 (101)   153/80 (104)


 


Pulse Ox 99 100  100


 


O2 Delivery Ventilator Ventilator Mechanical Ventilator Ventilator


 


    





    





 4/14/20 4/14/20 4/14/20 4/14/20





 13:50 14:30 15:00 15:16


 


Pulse 124 122 92 


 


Resp 32 30 18 


 


B/P (MAP) 165/102 (123) 135/84 (101) 107/55 (72) 


 


Pulse Ox 100 100 99 100


 


O2 Delivery Ventilator Ventilator Ventilator Ventilator





 4/14/20 4/14/20 4/14/20 4/14/20





 16:00 16:07 16:20 16:50


 


Temp 98.6   





 98.6   


 


Pulse 96   


 


Resp 19  19 19


 


B/P (MAP) 101/61 (74)   


 


Pulse Ox 99  99 100


 


O2 Delivery Ventilator Mechanical Ventilator Ventilator Ventilator


 


    





    





 4/14/20 4/14/20 4/14/20 4/14/20





 17:00 18:00 19:00 20:00


 


Temp   98.9 





   98.9 


 


Pulse 82 85 85 


 


Resp 20 22 20 


 


B/P (MAP) 104/59 (74) 112/70 (84) 118/65 (82) 


 


Pulse Ox 100 100 100 


 


O2 Delivery Ventilator Ventilator Ventilator Mechanical Ventilator


 


    





    





 4/14/20 4/14/20 4/14/20 4/14/20





 20:00 20:19 21:00 22:00


 


Pulse 85  99 103


 


Resp 22  23 22


 


B/P (MAP) 119/73 (88)  139/84 (102) 142/78 (99)


 


Pulse Ox 100 100 100 99


 


O2 Delivery Ventilator Ventilator Ventilator Ventilator





 4/14/20 4/14/20 4/14/20 4/14/20





 23:00 23:20 23:59 23:59


 


Temp    99.5





    99.5


 


Pulse 98   98


 


Resp 28   28


 


B/P (MAP) 165/88 (113)   141/81 (101)


 


Pulse Ox 99 100  99


 


O2 Delivery Ventilator Ventilator Mechanical Ventilator Ventilator


 


    





    





 4/15/20 4/15/20 4/15/20 4/15/20





 01:00 01:11 01:42 02:00


 


Pulse 90   85


 


Resp 22   22


 


B/P (MAP) 137/79 (98)   116/65 (82)


 


Pulse Ox 100 99 99 100


 


O2 Delivery Ventilator   Ventilator


 


O2 Flow Rate  6.0 6.0 





 4/15/20 4/15/20 4/15/20 4/15/20





 02:10 03:00 03:59 04:00


 


Temp   99.0 





   99.0 


 


Pulse  82 82 


 


Resp  28 21 


 


B/P (MAP)  112/67 (82) 114/60 (78) 


 


Pulse Ox 100 100 100 


 


O2 Delivery Ventilator Ventilator Ventilator Mechanical Ventilator


 


    





    





 4/15/20 4/15/20 4/15/20 4/15/20





 04:05 05:00 06:00 07:00


 


Pulse  80 82 112


 


Resp  20 20 17


 


B/P (MAP)  114/60 (78) 116/66 (83) 165/91 (115)


 


Pulse Ox 100 100 100 98


 


O2 Delivery Ventilator Ventilator Ventilator Ventilator





 4/15/20 4/15/20 4/15/20 4/15/20





 07:34 08:00 08:00 08:13


 


Temp   99.6 





   99.6 


 


Pulse   93 


 


Resp   23 16


 


B/P (MAP)   143/76 (98) 


 


Pulse Ox 99  100 99


 


O2 Delivery Ventilator Mechanical Ventilator Ventilator Ventilator


 


    





    





 4/15/20 4/15/20  





 08:46 09:00  


 


Pulse  88  


 


Resp 23 19  


 


B/P (MAP)  154/81 (105)  


 


Pulse Ox 99 99  


 


O2 Delivery Ventilator Ventilator  














Intake and Output   


 


 4/14/20 4/14/20 4/15/20





 15:00 23:00 07:00


 


Intake Total 350 ml 1395.28 ml 1670 ml


 


Output Total 95 ml 120 ml 120 ml


 


Balance 255 ml 1275.28 ml 1550 ml

















CLEMENTINA PANTOJA MD           Apr 15, 2020 09:41

## 2020-04-15 NOTE — PDOC
Renal-Progress Notes


Subjective Notes


Notes


STARTING TO WAKE UP





History of Present Illness


Hx of present illness


IMPROVED





Vitals


Vitals





Vital Signs








  Date Time  Temp Pulse Resp B/P (MAP) Pulse Ox O2 Delivery O2 Flow Rate FiO2


 


4/15/20 11:00  81 21 133/77 (95) 99 Ventilator  


 


4/15/20 08:00 99.6       





 99.6       


 


4/15/20 01:42       6.0 








Weight


Weight [ ]





I.O.


Intake and Output











Intake and Output 


 


 4/15/20





 07:00


 


Intake Total 3415.28 ml


 


Output Total 335 ml


 


Balance 3080.28 ml


 


 


 


Intake Oral 0 ml


 


IV Total 3415.28 ml


 


Output Urine Total 285 ml


 


Gastric Drainage Total 50 ml











Labs


Labs





Laboratory Tests








Test


 4/14/20


14:37 4/14/20


17:27 4/14/20


23:34 4/15/20


05:41


 


Glucose (Fingerstick)


 153 mg/dL


(70-99) 205 mg/dL


(70-99) 167 mg/dL


(70-99) 130 mg/dL


(70-99)


 


Test


 4/15/20


05:45 


 


 





 


White Blood Count


 5.8 x10^3/uL


(4.0-11.0) 


 


 





 


Red Blood Count


 2.61 x10^6/uL


(3.50-5.40) 


 


 





 


Hemoglobin


 8.2 g/dL


(12.0-15.5) 


 


 





 


Hematocrit


 25.2 %


(36.0-47.0) 


 


 





 


Mean Corpuscular Volume 97 fL ()    


 


Mean Corpuscular Hemoglobin 32 pg (25-35)    


 


Mean Corpuscular Hemoglobin


Concent 33 g/dL


(31-37) 


 


 





 


Red Cell Distribution Width


 18.6 %


(11.5-14.5) 


 


 





 


Platelet Count


 433 x10^3/uL


(140-400) 


 


 





 


Neutrophils (%) (Auto) 70 % (31-73)    


 


Lymphocytes (%) (Auto) 16 % (24-48)    


 


Monocytes (%) (Auto) 13 % (0-9)    


 


Eosinophils (%) (Auto) 1 % (0-3)    


 


Basophils (%) (Auto) 0 % (0-3)    


 


Neutrophils # (Auto)


 4.0 x10^3/uL


(1.8-7.7) 


 


 





 


Lymphocytes # (Auto)


 0.9 x10^3/uL


(1.0-4.8) 


 


 





 


Monocytes # (Auto)


 0.8 x10^3/uL


(0.0-1.1) 


 


 





 


Eosinophils # (Auto)


 0.0 x10^3/uL


(0.0-0.7) 


 


 





 


Basophils # (Auto)


 0.0 x10^3/uL


(0.0-0.2) 


 


 





 


Sodium Level


 138 mmol/L


(136-145) 


 


 





 


Potassium Level


 3.9 mmol/L


(3.5-5.1) 


 


 





 


Chloride Level


 101 mmol/L


() 


 


 





 


Carbon Dioxide Level


 29 mmol/L


(21-32) 


 


 





 


Anion Gap 8 (6-14)    


 


Blood Urea Nitrogen


 59 mg/dL


(7-20) 


 


 





 


Creatinine


 1.5 mg/dL


(0.6-1.0) 


 


 





 


Estimated GFR


(Cockcroft-Gault) 36.9 


 


 


 





 


BUN/Creatinine Ratio 39 (6-20)    


 


Glucose Level


 139 mg/dL


(70-99) 


 


 





 


Calcium Level


 8.9 mg/dL


(8.5-10.1) 


 


 





 


Phosphorus Level


 3.9 mg/dL


(2.6-4.7) 


 


 





 


Magnesium Level


 2.0 mg/dL


(1.8-2.4) 


 


 





 


Total Bilirubin


 0.6 mg/dL


(0.2-1.0) 


 


 





 


Aspartate Amino Transf


(AST/SGOT) 31 U/L (15-37) 


 


 


 





 


Alanine Aminotransferase


(ALT/SGPT) 16 U/L (14-59) 


 


 


 





 


Alkaline Phosphatase


 72 U/L


() 


 


 





 


Total Protein


 6.1 g/dL


(6.4-8.2) 


 


 





 


Albumin


 3.1 g/dL


(3.4-5.0) 


 


 





 


Albumin/Globulin Ratio 1.0 (1.0-1.7)    


 


Hepatitis B Surface Antigen


 Nonreactive


(Nonreactive) 


 


 














Micro


Micro





Microbiology


4/14/20 Blood Culture - Preliminary, Resulted


          NO GROWTH AFTER 1 DAY


4/12/20 Urine Culture - Final, Complete


          


4/12/20 Urine Culture Result 1 ([facility]) - Final, Complete





Review of Systems


Constitutional:  yes: other (ON THE VENT)





Physical Exam


General Appearance:  other (ON THE VENT)


Skin:  warm


Respiratory:  decreased breath sounds


Heart:  S1S2


Abdomen:  soft, bowel sounds present


Genitourinary:  bladder flat


Extremities:  pulses present, edema


Neurology:  other (SEDATED)





Assessment


Assessment


IMP





[id number]


ANEMIA


LEUCOCYTOSIS-IMPROVING


ANASARCA DUE TO 3RD SPACING


HYPERKALEMIA-RESOLVED


ACIDOSIS AND ACIDEMIA-CONTROLLED


ACUTE REPS FAILURE


ACUTE PANCREATITIS


HYPOALBUMINEMIA


HYPOCALCEMIA-FROM SAPONIFICATION-BETTER


POOR HD CATHETER FUNCTION





PLAN








HD TOMORROW


TPN TO CONTINUE AS NEEDED


PRBC AS NEEDED


CONT YOLI


VENT SUPPORT


ANTIBIOTICS


WILL FOLLOW











BETH HEIN MD                 Apr 15, 2020 11:33

## 2020-04-15 NOTE — NUR
Paracentesis complete-- 4.3 L removed from abdomen. Consent received from patient's 
daughters via phone. Dr. Stephens and RN verified. 

Order received from Dr. Stephens to give 37.5 gm Albumin 25%. 

Patient is currently awake and responding appropriately. See VS, assessments. No

## 2020-04-15 NOTE — PDOC
PROGRESS NOTES


Chief Complaint


Chief Complaint


A/P:


Respiratory failure requiring mechanical ventilation (on vent since 3/23)


bilateral pleural effusions/pulm edema 


Sepsis


Severe Acute gallstone pancreatitis (not a surgical candidate at this time) with

necrosis


Acute kidney failure now requiring dialysis


Salpingitis


Gallstones (Calculus of gallbladder with acute cholecystitis without 

obstruction)


HTN 


Leukocytosis 


Hypoxia


Uterine fibroid


Intractable pain


Intractable nausea


Covid 19 negative. 


Acute on chronic anemia 


EEG: No seizure activity.


ESRD on HD


Hyperglycemia, persistently in 200s





History of Present Illness


History of Present Illness


Ms Tadeo is a 48yo F w/ PMHx HTN, prediabetes who presented to the emergency 

room with complaints of abdominal pain on 3/16/2020. Found with Lipase 64916, 

, , Bilirubin 1.4.


CT abdomen confirms pancreatic inflammation, peripancreatic fluid and 

inflammatory changes around the pancreas consistent with pancreatitis. 

Cholelithiasis and 1.4cm uterine fibroid as well as possible left salpingitis. 

Admitted for further care


GI, General surgery, ID, Pulm consulted.





3/17: No urine output. Added dilaudid for pain, PICC placed per IR. Renal US 

negative.Seen bedside in ICU, given 2L additional NSS and albumin infusion. 

Still hypotensive, started on levophed. Repeat CT abdomen with necrosis. 


3/18: O2 saturation 87% on nasal cannula oxygen. Dialysis catheter per 

nephrology


3/19: She is now on BiPAP appears more ill, now on dialysis


3/20: Seen on BiPAP. Her mother and another family member are present and seemed

to be good support for her. Currently on dialysis. Appears critically ill


3/21: Overnight Tmax 101.7 , still on BiPAP FiO2 40%, still on low dose Levophed

gtt, TPN initiated. On dialysis


4/6: Tracheostomy


4/12: S/p tracheostomy on vent spontaneous respirations with 5 of pressure 

support 35% FiO2, rectal tube and a [place], off pressors


4/13: Off pressors. Seen on dialysis this morning. BUN 80. Tracking with her 

eyes. Still on vent via trach.


4/14: BUN 68, Cr 1.7. Temp 100.2F axillary. WBC 9.8. Still on vent via trach. 

Tracking reasonably well. In obvious discomfort. Removed PICC and CVC LIJ and 

replaced. Tips sent for culture. CT chest/abd/pelvis with bilateral pleural 

effusion and ascites.





Renal function stable. Still on vent. More interactive today. Miming wish for 

food. Plan discussed for thoracentesis/paracentesis with daughters today. They 

were under impression patient was doing worse due to a miscommunication which 

has been clarified over the phone.





Plan:


Fungal cultures, removed PICC and IJ. D/w IR to replace left IJ


Paracentesis/thoracentesis today





Vitals


Vitals





Vital Signs








  Date Time  Temp Pulse Resp B/P (MAP) Pulse Ox O2 Delivery O2 Flow Rate FiO2


 


4/15/20 08:13   16  99 Ventilator  


 


4/15/20 06:00  82  116/66 (83)    


 


4/15/20 03:59 99.0       





 99.0       


 


4/15/20 01:42       6.0 











Physical Exam


Physical Exam


GENERAL: Alert but not responsive/agitated some . Feels warm -  has generalized 

anasarca 


HEENT: Pupils equal, + NGT, oral cavity dry 


NECK:  Trach/vent 


LUNGS: Diminished aeration 


HEART:  S1, S2, regular 


ABDOMEN:  Distended, hypoactive BS, Rectal tube- out


: [place]  (3/17)


EXTREMITIES: Generalized edema, no cyanosis, SCDs bilaterally 


DERMATOLOGIC:  Warm and dry.  No generalized rash.  


CENTRAL NERVOUS SYSTEM: Opens eyes 


HDC, LIJ(4/6) and RUE-PICC without signs of complications


General:  Cooperative, No acute distress


Heart:  Other (increased rate)


Lungs:  Other (dimished in BLL)


Abdomen:  Other (distended )


Extremities:  No edema, Other (SOME CLUBBING )


Skin:  Other (mottling noted to extremities )





Labs


LABS





Laboratory Tests








Test


 4/14/20


09:00 4/14/20


14:37 4/14/20


17:27 4/14/20


23:34


 


White Blood Count


 9.8 x10^3/uL


(4.0-11.0) 


 


 





 


Red Blood Count


 2.87 x10^6/uL


(3.50-5.40) 


 


 





 


Hemoglobin


 9.2 g/dL


(12.0-15.5) 


 


 





 


Hematocrit


 27.9 %


(36.0-47.0) 


 


 





 


Mean Corpuscular Volume 97 fL ()    


 


Mean Corpuscular Hemoglobin 32 pg (25-35)    


 


Mean Corpuscular Hemoglobin


Concent 33 g/dL


(31-37) 


 


 





 


Red Cell Distribution Width


 19.2 %


(11.5-14.5) 


 


 





 


Platelet Count


 380 x10^3/uL


(140-400) 


 


 





 


Sodium Level


 137 mmol/L


(136-145) 


 


 





 


Potassium Level


 3.7 mmol/L


(3.5-5.1) 


 


 





 


Chloride Level


 98 mmol/L


() 


 


 





 


Carbon Dioxide Level


 29 mmol/L


(21-32) 


 


 





 


Anion Gap 10 (6-14)    


 


Blood Urea Nitrogen


 64 mg/dL


(7-20) 


 


 





 


Creatinine


 1.7 mg/dL


(0.6-1.0) 


 


 





 


Estimated GFR


(Cockcroft-Gault) 31.9 


 


 


 





 


Glucose Level


 166 mg/dL


(70-99) 


 


 





 


Calcium Level


 9.0 mg/dL


(8.5-10.1) 


 


 





 


Glucose (Fingerstick)


 


 153 mg/dL


(70-99) 205 mg/dL


(70-99) 167 mg/dL


(70-99)


 


Test


 4/15/20


05:41 4/15/20


05:45 


 





 


Glucose (Fingerstick)


 130 mg/dL


(70-99) 


 


 





 


White Blood Count


 


 5.8 x10^3/uL


(4.0-11.0) 


 





 


Red Blood Count


 


 2.61 x10^6/uL


(3.50-5.40) 


 





 


Hemoglobin


 


 8.2 g/dL


(12.0-15.5) 


 





 


Hematocrit


 


 25.2 %


(36.0-47.0) 


 





 


Mean Corpuscular Volume  97 fL ()   


 


Mean Corpuscular Hemoglobin  32 pg (25-35)   


 


Mean Corpuscular Hemoglobin


Concent 


 33 g/dL


(31-37) 


 





 


Red Cell Distribution Width


 


 18.6 %


(11.5-14.5) 


 





 


Platelet Count


 


 433 x10^3/uL


(140-400) 


 





 


Neutrophils (%) (Auto)  70 % (31-73)   


 


Lymphocytes (%) (Auto)  16 % (24-48)   


 


Monocytes (%) (Auto)  13 % (0-9)   


 


Eosinophils (%) (Auto)  1 % (0-3)   


 


Basophils (%) (Auto)  0 % (0-3)   


 


Neutrophils # (Auto)


 


 4.0 x10^3/uL


(1.8-7.7) 


 





 


Lymphocytes # (Auto)


 


 0.9 x10^3/uL


(1.0-4.8) 


 





 


Monocytes # (Auto)


 


 0.8 x10^3/uL


(0.0-1.1) 


 





 


Eosinophils # (Auto)


 


 0.0 x10^3/uL


(0.0-0.7) 


 





 


Basophils # (Auto)


 


 0.0 x10^3/uL


(0.0-0.2) 


 





 


Sodium Level


 


 138 mmol/L


(136-145) 


 





 


Potassium Level


 


 3.9 mmol/L


(3.5-5.1) 


 





 


Chloride Level


 


 101 mmol/L


() 


 





 


Carbon Dioxide Level


 


 29 mmol/L


(21-32) 


 





 


Anion Gap  8 (6-14)   


 


Blood Urea Nitrogen


 


 59 mg/dL


(7-20) 


 





 


Creatinine


 


 1.5 mg/dL


(0.6-1.0) 


 





 


Estimated GFR


(Cockcroft-Gault) 


 36.9 


 


 





 


BUN/Creatinine Ratio  39 (6-20)   


 


Glucose Level


 


 139 mg/dL


(70-99) 


 





 


Calcium Level


 


 8.9 mg/dL


(8.5-10.1) 


 





 


Phosphorus Level


 


 3.9 mg/dL


(2.6-4.7) 


 





 


Magnesium Level


 


 2.0 mg/dL


(1.8-2.4) 


 





 


Total Bilirubin


 


 0.6 mg/dL


(0.2-1.0) 


 





 


Aspartate Amino Transf


(AST/SGOT) 


 31 U/L (15-37) 


 


 





 


Alanine Aminotransferase


(ALT/SGPT) 


 16 U/L (14-59) 


 


 





 


Alkaline Phosphatase


 


 72 U/L


() 


 





 


Total Protein


 


 6.1 g/dL


(6.4-8.2) 


 





 


Albumin


 


 3.1 g/dL


(3.4-5.0) 


 





 


Albumin/Globulin Ratio  1.0 (1.0-1.7)   











Assessment and Plan


Assessmemt and Plan


Problems


Medical Problems:


(1) Acute pancreatitis


Status: Acute  





(2) Cholelithiasis


Status: Acute  











Comment


Review of Relevant


I have reviewed the following items josy (where applicable) has been applied.


Labs





Laboratory Tests








Test


 4/13/20


14:11 4/13/20


15:00 4/13/20


18:26 4/14/20


01:07


 


Glucose (Fingerstick)


 151 mg/dL


(70-99) 


 213 mg/dL


(70-99) 174 mg/dL


(70-99)


 


O2 Saturation  94 % (92-99)   


 


Arterial Blood pH


 


 7.46


(7.35-7.45) 


 





 


Arterial Blood pCO2 at


Patient Temp 


 38 mmHg


(35-46) 


 





 


Arterial Blood pO2 at Patient


Temp 


 74 mmHg


() 


 





 


Arterial Blood HCO3


 


 26 mmol/L


(21-28) 


 





 


Arterial Blood Base Excess


 


 2 mmol/L


(-3-3) 


 





 


FiO2  35   


 


Test


 4/14/20


06:39 4/14/20


09:00 4/14/20


14:37 4/14/20


17:27


 


Glucose (Fingerstick)


 157 mg/dL


(70-99) 


 153 mg/dL


(70-99) 205 mg/dL


(70-99)


 


White Blood Count


 


 9.8 x10^3/uL


(4.0-11.0) 


 





 


Red Blood Count


 


 2.87 x10^6/uL


(3.50-5.40) 


 





 


Hemoglobin


 


 9.2 g/dL


(12.0-15.5) 


 





 


Hematocrit


 


 27.9 %


(36.0-47.0) 


 





 


Mean Corpuscular Volume  97 fL ()   


 


Mean Corpuscular Hemoglobin  32 pg (25-35)   


 


Mean Corpuscular Hemoglobin


Concent 


 33 g/dL


(31-37) 


 





 


Red Cell Distribution Width


 


 19.2 %


(11.5-14.5) 


 





 


Platelet Count


 


 380 x10^3/uL


(140-400) 


 





 


Sodium Level


 


 137 mmol/L


(136-145) 


 





 


Potassium Level


 


 3.7 mmol/L


(3.5-5.1) 


 





 


Chloride Level


 


 98 mmol/L


() 


 





 


Carbon Dioxide Level


 


 29 mmol/L


(21-32) 


 





 


Anion Gap  10 (6-14)   


 


Blood Urea Nitrogen


 


 64 mg/dL


(7-20) 


 





 


Creatinine


 


 1.7 mg/dL


(0.6-1.0) 


 





 


Estimated GFR


(Cockcroft-Gault) 


 31.9 


 


 





 


Glucose Level


 


 166 mg/dL


(70-99) 


 





 


Calcium Level


 


 9.0 mg/dL


(8.5-10.1) 


 





 


Test


 4/14/20


23:34 4/15/20


05:41 4/15/20


05:45 





 


Glucose (Fingerstick)


 167 mg/dL


(70-99) 130 mg/dL


(70-99) 


 





 


White Blood Count


 


 


 5.8 x10^3/uL


(4.0-11.0) 





 


Red Blood Count


 


 


 2.61 x10^6/uL


(3.50-5.40) 





 


Hemoglobin


 


 


 8.2 g/dL


(12.0-15.5) 





 


Hematocrit


 


 


 25.2 %


(36.0-47.0) 





 


Mean Corpuscular Volume   97 fL ()  


 


Mean Corpuscular Hemoglobin   32 pg (25-35)  


 


Mean Corpuscular Hemoglobin


Concent 


 


 33 g/dL


(31-37) 





 


Red Cell Distribution Width


 


 


 18.6 %


(11.5-14.5) 





 


Platelet Count


 


 


 433 x10^3/uL


(140-400) 





 


Neutrophils (%) (Auto)   70 % (31-73)  


 


Lymphocytes (%) (Auto)   16 % (24-48)  


 


Monocytes (%) (Auto)   13 % (0-9)  


 


Eosinophils (%) (Auto)   1 % (0-3)  


 


Basophils (%) (Auto)   0 % (0-3)  


 


Neutrophils # (Auto)


 


 


 4.0 x10^3/uL


(1.8-7.7) 





 


Lymphocytes # (Auto)


 


 


 0.9 x10^3/uL


(1.0-4.8) 





 


Monocytes # (Auto)


 


 


 0.8 x10^3/uL


(0.0-1.1) 





 


Eosinophils # (Auto)


 


 


 0.0 x10^3/uL


(0.0-0.7) 





 


Basophils # (Auto)


 


 


 0.0 x10^3/uL


(0.0-0.2) 





 


Sodium Level


 


 


 138 mmol/L


(136-145) 





 


Potassium Level


 


 


 3.9 mmol/L


(3.5-5.1) 





 


Chloride Level


 


 


 101 mmol/L


() 





 


Carbon Dioxide Level


 


 


 29 mmol/L


(21-32) 





 


Anion Gap   8 (6-14)  


 


Blood Urea Nitrogen


 


 


 59 mg/dL


(7-20) 





 


Creatinine


 


 


 1.5 mg/dL


(0.6-1.0) 





 


Estimated GFR


(Cockcroft-Gault) 


 


 36.9 


 





 


BUN/Creatinine Ratio   39 (6-20)  


 


Glucose Level


 


 


 139 mg/dL


(70-99) 





 


Calcium Level


 


 


 8.9 mg/dL


(8.5-10.1) 





 


Phosphorus Level


 


 


 3.9 mg/dL


(2.6-4.7) 





 


Magnesium Level


 


 


 2.0 mg/dL


(1.8-2.4) 





 


Total Bilirubin


 


 


 0.6 mg/dL


(0.2-1.0) 





 


Aspartate Amino Transf


(AST/SGOT) 


 


 31 U/L (15-37) 


 





 


Alanine Aminotransferase


(ALT/SGPT) 


 


 16 U/L (14-59) 


 





 


Alkaline Phosphatase


 


 


 72 U/L


() 





 


Total Protein


 


 


 6.1 g/dL


(6.4-8.2) 





 


Albumin


 


 


 3.1 g/dL


(3.4-5.0) 





 


Albumin/Globulin Ratio   1.0 (1.0-1.7)  








Laboratory Tests








Test


 4/14/20


09:00 4/14/20


14:37 4/14/20


17:27 4/14/20


23:34


 


White Blood Count


 9.8 x10^3/uL


(4.0-11.0) 


 


 





 


Red Blood Count


 2.87 x10^6/uL


(3.50-5.40) 


 


 





 


Hemoglobin


 9.2 g/dL


(12.0-15.5) 


 


 





 


Hematocrit


 27.9 %


(36.0-47.0) 


 


 





 


Mean Corpuscular Volume 97 fL ()    


 


Mean Corpuscular Hemoglobin 32 pg (25-35)    


 


Mean Corpuscular Hemoglobin


Concent 33 g/dL


(31-37) 


 


 





 


Red Cell Distribution Width


 19.2 %


(11.5-14.5) 


 


 





 


Platelet Count


 380 x10^3/uL


(140-400) 


 


 





 


Sodium Level


 137 mmol/L


(136-145) 


 


 





 


Potassium Level


 3.7 mmol/L


(3.5-5.1) 


 


 





 


Chloride Level


 98 mmol/L


() 


 


 





 


Carbon Dioxide Level


 29 mmol/L


(21-32) 


 


 





 


Anion Gap 10 (6-14)    


 


Blood Urea Nitrogen


 64 mg/dL


(7-20) 


 


 





 


Creatinine


 1.7 mg/dL


(0.6-1.0) 


 


 





 


Estimated GFR


(Cockcroft-Gault) 31.9 


 


 


 





 


Glucose Level


 166 mg/dL


(70-99) 


 


 





 


Calcium Level


 9.0 mg/dL


(8.5-10.1) 


 


 





 


Glucose (Fingerstick)


 


 153 mg/dL


(70-99) 205 mg/dL


(70-99) 167 mg/dL


(70-99)


 


Test


 4/15/20


05:41 4/15/20


05:45 


 





 


Glucose (Fingerstick)


 130 mg/dL


(70-99) 


 


 





 


White Blood Count


 


 5.8 x10^3/uL


(4.0-11.0) 


 





 


Red Blood Count


 


 2.61 x10^6/uL


(3.50-5.40) 


 





 


Hemoglobin


 


 8.2 g/dL


(12.0-15.5) 


 





 


Hematocrit


 


 25.2 %


(36.0-47.0) 


 





 


Mean Corpuscular Volume  97 fL ()   


 


Mean Corpuscular Hemoglobin  32 pg (25-35)   


 


Mean Corpuscular Hemoglobin


Concent 


 33 g/dL


(31-37) 


 





 


Red Cell Distribution Width


 


 18.6 %


(11.5-14.5) 


 





 


Platelet Count


 


 433 x10^3/uL


(140-400) 


 





 


Neutrophils (%) (Auto)  70 % (31-73)   


 


Lymphocytes (%) (Auto)  16 % (24-48)   


 


Monocytes (%) (Auto)  13 % (0-9)   


 


Eosinophils (%) (Auto)  1 % (0-3)   


 


Basophils (%) (Auto)  0 % (0-3)   


 


Neutrophils # (Auto)


 


 4.0 x10^3/uL


(1.8-7.7) 


 





 


Lymphocytes # (Auto)


 


 0.9 x10^3/uL


(1.0-4.8) 


 





 


Monocytes # (Auto)


 


 0.8 x10^3/uL


(0.0-1.1) 


 





 


Eosinophils # (Auto)


 


 0.0 x10^3/uL


(0.0-0.7) 


 





 


Basophils # (Auto)


 


 0.0 x10^3/uL


(0.0-0.2) 


 





 


Sodium Level


 


 138 mmol/L


(136-145) 


 





 


Potassium Level


 


 3.9 mmol/L


(3.5-5.1) 


 





 


Chloride Level


 


 101 mmol/L


() 


 





 


Carbon Dioxide Level


 


 29 mmol/L


(21-32) 


 





 


Anion Gap  8 (6-14)   


 


Blood Urea Nitrogen


 


 59 mg/dL


(7-20) 


 





 


Creatinine


 


 1.5 mg/dL


(0.6-1.0) 


 





 


Estimated GFR


(Cockcroft-Gault) 


 36.9 


 


 





 


BUN/Creatinine Ratio  39 (6-20)   


 


Glucose Level


 


 139 mg/dL


(70-99) 


 





 


Calcium Level


 


 8.9 mg/dL


(8.5-10.1) 


 





 


Phosphorus Level


 


 3.9 mg/dL


(2.6-4.7) 


 





 


Magnesium Level


 


 2.0 mg/dL


(1.8-2.4) 


 





 


Total Bilirubin


 


 0.6 mg/dL


(0.2-1.0) 


 





 


Aspartate Amino Transf


(AST/SGOT) 


 31 U/L (15-37) 


 


 





 


Alanine Aminotransferase


(ALT/SGPT) 


 16 U/L (14-59) 


 


 





 


Alkaline Phosphatase


 


 72 U/L


() 


 





 


Total Protein


 


 6.1 g/dL


(6.4-8.2) 


 





 


Albumin


 


 3.1 g/dL


(3.4-5.0) 


 





 


Albumin/Globulin Ratio  1.0 (1.0-1.7)   








Microbiology


4/12/20 Urine Culture - Final, Complete


          


4/12/20 Urine Culture Result 1 ([facility]) - Final, Complete


          


4/10/20 Blood Culture - Preliminary, Resulted


          NO GROWTH AFTER 4 DAYS


Medications





Current Medications


Sodium Chloride 1,000 ml @  1,000 mls/hr Q1H IV  Last administered on 3/16/20at 

03:00;  Start 3/16/20 at 03:00;  Stop 3/16/20 at 03:59;  Status DC


Ondansetron HCl (Zofran) 4 mg 1X  ONCE IVP  Last administered on 3/16/20at 

03:27;  Start 3/16/20 at 03:00;  Stop 3/16/20 at 03:01;  Status DC


Morphine Sulfate (Morphine Sulfate) 4 mg 1X  ONCE IV ;  Start 3/16/20 at 03:00; 

Stop 3/16/20 at 03:01;  Status Cancel


Ketorolac Tromethamine (Toradol 30mg Vial) 30 mg 1X  ONCE IV  Last administered 

on 3/16/20at 02:54;  Start 3/16/20 at 03:00;  Stop 3/16/20 at 03:01;  Status DC


Fentanyl Citrate (Fentanyl 2ml Vial) 25 mcg 1X  ONCE IVP  Last administered on 

3/16/20at 03:23;  Start 3/16/20 at 03:30;  Stop 3/16/20 at 03:31;  Status DC


Fentanyl Citrate (Fentanyl 2ml Vial) 100 mcg STK-MED ONCE .ROUTE ;  Start 

3/16/20 at 03:18;  Stop 3/16/20 at 03:18;  Status DC


Iohexol (Omnipaque 350 Mg/ml) 90 ml 1X  ONCE IV  Last administered on 3/16/20at 

03:25;  Start 3/16/20 at 03:30;  Stop 3/16/20 at 03:31;  Status DC


Info (CONTRAST GIVEN -- Rx MONITORING) 1 each PRN DAILY  PRN MC SEE COMMENTS;  

Start 3/16/20 at 03:30;  Stop 3/18/20 at 03:29;  Status DC


Hydromorphone HCl (Dilaudid) 0.5 mg 1X  ONCE IV  Last administered on 3/16/20at 

03:55;  Start 3/16/20 at 04:30;  Stop 3/16/20 at 04:32;  Status DC


Ondansetron HCl (Zofran) 4 mg PRN Q8HRS  PRN IV NAUSEA/VOMITING 1ST CHOICE;  

Start 3/16/20 at 05:00;  Stop 3/16/20 at 09:27;  Status DC


Morphine Sulfate (Morphine Sulfate) 2 mg PRN Q2HR  PRN IV SEVERE PAIN 7-10 Last 

administered on 3/17/20at 12:26;  Start 3/16/20 at 05:00;  Stop 3/17/20 at 

14:15;  Status DC


Sodium Chloride 1,000 ml @  125 mls/hr Q8H IV  Last administered on 3/16/20at 

20:56;  Start 3/16/20 at 05:00;  Stop 3/17/20 at 04:59;  Status DC


Hydromorphone HCl (Dilaudid) 0.5 mg PRN Q3HRS  PRN IV SEVERE PAIN 7-10 Last 

administered on 3/17/20at 10:06;  Start 3/16/20 at 05:00;  Stop 3/17/20 at 

12:01;  Status DC


Piperacillin Sod/ Tazobactam Sod 4.5 gm/Sodium Chloride 100 ml @  200 mls/hr 1X 

ONCE IV  Last administered on 3/16/20at 05:44;  Start 3/16/20 at 06:00;  Stop 

3/16/20 at 06:29;  Status DC


Ondansetron HCl (Zofran) 4 mg PRN Q4HRS  PRN IV NAUSEA/VOMITING 1ST CHOICE Last 

administered on 4/12/20at 09:56;  Start 3/16/20 at 09:30


Insulin Human Lispro (HumaLOG) 0-9 UNITS Q6HRS SQ  Last administered on 

4/14/20at 23:38;  Start 3/16/20 at 09:30


Dextrose (Dextrose 50%-Water Syringe) 12.5 gm PRN Q15MIN  PRN IV SEE COMMENTS;  

Start 3/16/20 at 09:30


Pantoprazole Sodium (PROTONIX VIAL for IV PUSH) 40 mg DAILYAC IVP  Last 

administered on 4/15/20at 07:38;  Start 3/16/20 at 11:30


Prochlorperazine Edisylate (Compazine) 10 mg PRN Q6HRS  PRN IV NAUSEA/VOMITING, 

2nd CHOICE Last administered on 3/17/20at 00:42;  Start 3/16/20 at 17:45


Atenolol (Tenormin) 100 mg DAILY PO ;  Start 3/17/20 at 09:00;  Stop 3/16/20 at 

20:08;  Status DC


Metoprolol Tartrate (Lopressor Vial) 2.5 mg Q6HRS IVP  Last administered on 

3/17/20at 05:51;  Start 3/16/20 at 20:15;  Stop 3/17/20 at 10:02;  Status DC


Metoprolol Tartrate (Lopressor Vial) 5 mg Q6HRS IVP  Last administered on 

3/26/20at 00:12;  Start 3/17/20 at 10:15;  Stop 3/28/20 at 08:48;  Status DC


Hydromorphone HCl (Dilaudid) 1 mg PRN Q3HRS  PRN IV SEVERE PAIN 7-10 Last 

administered on 3/23/20at 05:13;  Start 3/17/20 at 12:00;  Stop 3/31/20 at 

00:25;  Status DC


Lidocaine HCl (Buffered Lidocaine 1%) 3 ml STK-MED ONCE .ROUTE ;  Start 3/17/20 

at 12:55;  Stop 3/17/20 at 12:56;  Status DC


Albumin Human 500 ml @  125 mls/hr 1X  ONCE IV  Last administered on 3/17/20at 

14:33;  Start 3/17/20 at 14:30;  Stop 3/17/20 at 18:32;  Status DC


Norepinephrine Bitartrate 8 mg/ Dextrose 258 ml @  17.299 mls/ hr CONT  PRN IV 

PER PROTOCOL Last administered on 4/14/20at 12:48;  Start 3/17/20 at 15:30


Sodium Chloride 1,000 ml @  125 mls/hr Q8H IV  Last administered on 3/17/20at 

21:04;  Start 3/17/20 at 16:00;  Stop 3/18/20 at 02:42;  Status DC


Albumin Human 500 ml @  125 mls/hr PRN BID  PRN IV After every 2L NSS & BP < 

90mm Last administered on 4/1/20at 14:21;  Start 3/17/20 at 16:00


Iohexol (Omnipaque 300 Mg/ml) 60 ml 1X  ONCE IV  Last administered on 3/17/20at 

17:20;  Start 3/17/20 at 17:00;  Stop 3/17/20 at 17:01;  Status DC


Info (CONTRAST GIVEN -- Rx MONITORING) 1 each PRN DAILY  PRN MC SEE COMMENTS;  

Start 3/17/20 at 17:00;  Stop 3/19/20 at 16:59;  Status DC


Meropenem 1 gm/ Sodium Chloride 100 ml @  200 mls/hr Q8HRS IV  Last administered

on 3/18/20at 05:45;  Start 3/17/20 at 20:00;  Stop 3/18/20 at 08:48;  Status DC


Furosemide (Lasix) 40 mg 1X  ONCE IVP  Last administered on 3/17/20at 22:12;  

Start 3/17/20 at 22:30;  Stop 3/17/20 at 22:31;  Status DC


Calcium Chloride 1000 mg/Sodium Chloride 110 ml @  220 mls/hr 1X  ONCE IV  Last 

administered on 3/17/20at 22:11;  Start 3/17/20 at 22:30;  Stop 3/17/20 at 

22:59;  Status DC


Albuterol Sulfate (Ventolin Neb Soln) 2.5 mg 1X  ONCE NEB  Last administered on 

3/18/20at 00:56;  Start 3/17/20 at 22:30;  Stop 3/17/20 at 22:31;  Status DC


Insulin Human Regular (HumuLIN R VIAL) 5 unit 1X  ONCE IV  Last administered on 

3/17/20at 22:14;  Start 3/17/20 at 22:30;  Stop 3/17/20 at 22:31;  Status DC


Magnesium Sulfate 50 ml @ 25 mls/hr 1X  ONCE IV  Last administered on 3/18/20at 

02:57;  Start 3/18/20 at 03:00;  Stop 3/18/20 at 04:59;  Status DC


Calcium Gluconate 1000 mg/Sodium Chloride 110 ml @  220 mls/hr 1X  ONCE IV  Last

administered on 3/18/20at 02:46;  Start 3/18/20 at 03:00;  Stop 3/18/20 at 

03:29;  Status DC


Sodium Chloride 1,000 ml @  200 mls/hr Q5H IV  Last administered on 3/18/20at 

02:46;  Start 3/18/20 at 03:00;  Stop 3/18/20 at 10:21;  Status DC


Calcium Gluconate 1000 mg/Sodium Chloride 110 ml @  220 mls/hr 1X  ONCE IV  Last

administered on 3/18/20at 03:21;  Start 3/18/20 at 03:30;  Stop 3/18/20 at 

03:59;  Status DC


Sodium Bicarbonate 50 meq/Sodium Chloride 1,050 ml @  75 mls/hr Q14H IV  Last 

administered on 3/22/20at 21:10;  Start 3/18/20 at 07:30;  Stop 3/23/20 at 

10:28;  Status DC


Calcium Gluconate 2000 mg/Sodium Chloride 120 ml @  220 mls/hr 1X  ONCE IV  Last

administered on 3/18/20at 09:05;  Start 3/18/20 at 07:30;  Stop 3/18/20 at 

08:02;  Status DC


Lidocaine HCl (Xylocaine-Mpf 1% 2ml Vial) 2 ml STK-MED ONCE .ROUTE ;  Start 

3/18/20 at 08:47;  Stop 3/18/20 at 08:47;  Status DC


Meropenem 500 mg/ Sodium Chloride 50 ml @  100 mls/hr Q12HR IV  Last 

administered on 3/23/20at 21:01;  Start 3/18/20 at 18:00;  Stop 3/24/20 at 

07:58;  Status DC


Lidocaine HCl (Buffered Lidocaine 1%) 3 ml STK-MED ONCE .ROUTE ;  Start 3/18/20 

at 09:46;  Stop 3/18/20 at 09:46;  Status DC


Lidocaine HCl (Buffered Lidocaine 1%) 6 ml 1X  ONCE INJ  Last administered on 

3/18/20at 10:26;  Start 3/18/20 at 10:15;  Stop 3/18/20 at 10:16;  Status DC


Info (Tpn Per Pharmacy) 1 each PRN DAILY  PRN MC SEE COMMENTS Last administered 

on 4/14/20at 12:34;  Start 3/18/20 at 12:00


Sodium Chloride 1,000 ml @  1,000 mls/hr Q1H PRN IV hypotension;  Start 3/18/20 

at 12:07;  Stop 3/18/20 at 18:06;  Status DC


Diphenhydramine HCl (Benadryl) 25 mg 1X PRN  PRN IV ITCHING;  Start 3/18/20 at 

12:15;  Stop 3/19/20 at 12:14;  Status DC


Diphenhydramine HCl (Benadryl) 25 mg 1X PRN  PRN IV ITCHING;  Start 3/18/20 at 

12:15;  Stop 3/19/20 at 12:14;  Status DC


Sodium Chloride 1,000 ml @  400 mls/hr Q2H30M PRN IV PATENCY;  Start 3/18/20 at 

12:07;  Stop 3/19/20 at 00:06;  Status DC


Info (PHARMACY MONITORING -- do not chart) 1 each PRN DAILY  PRN MC SEE 

COMMENTS;  Start 3/18/20 at 12:15;  Stop 3/20/20 at 08:13;  Status DC


Sodium Chloride 90 meq/Calcium Gluconate 10 meq/ Multivitamins 10 ml/Chromium/ 

Copper/Manganese/ Seleni/Zn 1 ml/ Total Parenteral Nutrition/Amino 

Acids/Dextrose/ Fat Emulsion Intravenous 55.005 ml  @ 2.292 mls/hr TPN  CONT IV 

;  Start 3/18/20 at 22:00;  Stop 3/18/20 at 12:33;  Status DC


Info (Tpn Per Pharmacy) 1 each PRN DAILY  PRN MC SEE COMMENTS;  Start 3/18/20 at

12:30;  Status UNV


Sodium Chloride 90 meq/Calcium Gluconate 10 meq/ Multivitamins 10 ml/Chromium/ 

Copper/Manganese/ Seleni/Zn 0.5 ml/ Total Parenteral Nutrition/Amino 

Acids/Dextrose/ Fat Emulsion Intravenous 1,512 ml @  63 mls/hr TPN  CONT IV  

Last administered on 3/18/20at 22:06;  Start 3/18/20 at 22:00;  Stop 3/19/20 at 

21:59;  Status DC


Calcium Carbonate/ Glycine (Tums) 500 mg PRN AFTMEALHC  PRN PO INDIGESTION;  

Start 3/18/20 at 17:45


Calcium Gluconate (Calcium Gluconate) 2,000 mg 1X  ONCE IVP  Last administered 

on 3/19/20at 02:19;  Start 3/19/20 at 02:15;  Stop 3/19/20 at 02:16;  Status DC


Calcium Chloride 3000 mg/Sodium Chloride 1,030 ml @  50 mls/hr [id number] IV  Last 

administered on 3/21/20at 02:17;  Start 3/19/20 at 08:00;  Stop 3/21/20 at 

15:23;  Status DC


Lorazepam (Ativan Inj) 1 mg PRN Q4HRS  PRN IVP ANXIETY / AGITATION, 2nd choic 

Last administered on 4/14/20at 11:06;  Start 3/19/20 at 09:00


Sodium Chloride 1,000 ml @  1,000 mls/hr Q1H PRN IV hypotension;  Start 3/19/20 

at 08:56;  Stop 3/19/20 at 14:55;  Status DC


Albumin Human 200 ml @  200 mls/hr 1X PRN  PRN IV Hypotension;  Start 3/19/20 at

09:00;  Stop 3/19/20 at 14:59;  Status DC


Diphenhydramine HCl (Benadryl) 25 mg 1X PRN  PRN IV ITCHING;  Start 3/19/20 at 

09:00;  Stop 3/20/20 at 08:59;  Status DC


Diphenhydramine HCl (Benadryl) 25 mg 1X PRN  PRN IV ITCHING;  Start 3/19/20 at 

09:00;  Stop 3/20/20 at 08:59;  Status DC


Sodium Chloride 1,000 ml @  400 mls/hr Q2H30M PRN IV PATENCY;  Start 3/19/20 at 

08:56;  Stop 3/19/20 at 20:55;  Status DC


Info (PHARMACY MONITORING -- do not chart) 1 each PRN DAILY  PRN MC SEE 

COMMENTS;  Start 3/19/20 at 09:00;  Status UNV


Info (PHARMACY MONITORING -- do not chart) 1 each PRN DAILY  PRN MC SEE 

COMMENTS;  Start 3/19/20 at 09:00;  Stop 3/20/20 at 08:13;  Status DC


Digoxin (Lanoxin) 500 mcg 1X  ONCE IV  Last administered on 3/19/20at 10:04;  

Start 3/19/20 at 10:00;  Stop 3/19/20 at 10:01;  Status DC


Digoxin (Lanoxin) 125 mcg 1X  ONCE IV  Last administered on 3/19/20at 17:10;  

Start 3/19/20 at 18:00;  Stop 3/19/20 at 18:01;  Status DC


Magnesium Sulfate 100 ml @  25 mls/hr 1X  ONCE IV  Last administered on 

3/19/20at 12:48;  Start 3/19/20 at 13:00;  Stop 3/19/20 at 16:59;  Status DC


Sodium Chloride 90 meq/Magnesium Sulfate 10 meq/ Calcium Gluconate 20 meq/ 

Multivitamins 10 ml/Chromium/ Copper/Manganese/ Seleni/Zn 0.5 ml/ Total 

Parenteral Nutrition/Amino Acids/Dextrose/ Fat Emulsion Intravenous 1,512 ml @  

63 mls/hr TPN  CONT IV  Last administered on 3/19/20at 22:25;  Start 3/19/20 at 

22:00;  Stop 3/20/20 at 21:59;  Status DC


Sodium Chloride 1,000 ml @  1,000 mls/hr Q1H PRN IV hypotension;  Start 3/20/20 

at 08:05;  Stop 3/20/20 at 14:04;  Status DC


Albumin Human 200 ml @  200 mls/hr 1X  ONCE IV  Last administered on 3/20/20at 

08:57;  Start 3/20/20 at 08:15;  Stop 3/20/20 at 09:14;  Status DC


Diphenhydramine HCl (Benadryl) 25 mg 1X PRN  PRN IV ITCHING;  Start 3/20/20 at 

08:15;  Stop 3/21/20 at 08:14;  Status DC


Diphenhydramine HCl (Benadryl) 25 mg 1X PRN  PRN IV ITCHING;  Start 3/20/20 at 

08:15;  Stop 3/21/20 at 08:14;  Status DC


Sodium Chloride 1,000 ml @  400 mls/hr Q2H30M PRN IV PATENCY;  Start 3/20/20 at 

08:05;  Stop 3/20/20 at 20:04;  Status DC


Info (PHARMACY MONITORING -- do not chart) 1 each PRN DAILY  PRN MC SEE 

COMMENTS;  Start 3/20/20 at 08:15;  Stop 3/24/20 at 07:57;  Status DC


Sodium Chloride 90 meq/Potassium Chloride 15 meq/ Potassium Phosphate 10 mmol/ 

Magnesium Sulfate 10 meq/Calcium Gluconate 20 meq/ Multivitamins 10 ml/Chromium/

Copper/Manganese/ Seleni/Zn 0.5 ml/ Total Parenteral Nutrition/Amino 

Acids/Dextrose/ Fat Emulsion Intravenous 1,512 ml @  63 mls/hr TPN  CONT IV  

Last administered on 3/20/20at 21:01;  Start 3/20/20 at 22:00;  Stop 3/21/20 at 

21:59;  Status DC


Potassium Chloride/Water 100 ml @  100 mls/hr 1X  ONCE IV  Last administered on 

3/20/20at 14:09;  Start 3/20/20 at 14:00;  Stop 3/20/20 at 14:59;  Status DC


Benzocaine (Hurricaine One) 1 spray 1X  ONCE MM  Last administered on 3/20/20at 

16:38;  Start 3/20/20 at 14:30;  Stop 3/20/20 at 14:31;  Status DC


Lidocaine HCl (Glydo (Lidocaine) Jelly) 1 thomas 1X  ONCE MM  Last administered on 

3/20/20at 16:38;  Start 3/20/20 at 14:30;  Stop 3/20/20 at 14:31;  Status DC


Linezolid/Dextrose 300 ml @  300 mls/hr Q12HR IV  Last administered on 3/26/20at

21:04;  Start 3/20/20 at 20:00;  Stop 3/27/20 at 07:50;  Status DC


Acetaminophen (Tylenol) 650 mg PRN Q6HRS  PRN PO MILD PAIN / TEMP;  Start 

3/21/20 at 03:30;  Stop 3/21/20 at 03:36;  Status DC


Acetaminophen (Tylenol) 650 mg PRN Q6HRS  PRN PEG MILD PAIN / TEMP Last 

administered on 3/26/20at 07:35;  Start 3/21/20 at 03:36


Sodium Chloride 1,000 ml @  1,000 mls/hr Q1H PRN IV hypotension;  Start 3/21/20 

at 07:50;  Stop 3/21/20 at 13:49;  Status DC


Albumin Human 200 ml @  200 mls/hr 1X PRN  PRN IV Hypotension;  Start 3/21/20 at

08:00;  Stop 3/21/20 at 13:59;  Status DC


Sodium Chloride (Normal Saline Flush) 10 ml 1X PRN  PRN IV AP catheter pack;  

Start 3/21/20 at 08:00;  Stop 3/22/20 at 07:59;  Status DC


Sodium Chloride (Normal Saline Flush) 10 ml 1X PRN  PRN IV  catheter pack;  

Start 3/21/20 at 08:00;  Stop 3/22/20 at 07:59;  Status DC


Sodium Chloride 1,000 ml @  400 mls/hr Q2H30M PRN IV PATENCY;  Start 3/21/20 at 

07:50;  Stop 3/21/20 at 19:49;  Status DC


Info (PHARMACY MONITORING -- do not chart) 1 each PRN DAILY  PRN MC SEE 

COMMENTS;  Start 3/21/20 at 08:00;  Status UNV


Info (PHARMACY MONITORING -- do not chart) 1 each PRN DAILY  PRN MC SEE 

COMMENTS;  Start 3/21/20 at 08:00;  Stop 3/23/20 at 08:25;  Status DC


Sodium Chloride 90 meq/Potassium Chloride 15 meq/ Potassium Phosphate 10 mmol/ 

Magnesium Sulfate 10 meq/Calcium Gluconate 20 meq/ Multivitamins 10 ml/Chromium/

Copper/Manganese/ Seleni/Zn 0.5 ml/ Total Parenteral Nutrition/Amino 

Acids/Dextrose/ Fat Emulsion Intravenous 1,512 ml @  63 mls/hr TPN  CONT IV  

Last administered on 3/21/20at 20:57;  Start 3/21/20 at 22:00;  Stop 3/22/20 at 

21:59;  Status DC


Sodium Chloride 90 meq/Potassium Chloride 15 meq/ Potassium Phosphate 15 mmol/ 

Magnesium Sulfate 10 meq/Calcium Gluconate 20 meq/ Multivitamins 10 ml/Chromium/

Copper/Manganese/ Seleni/Zn 0.5 ml/ Total Parenteral Nutrition/Amino 

Acids/Dextrose/ Fat Emulsion Intravenous 1,512 ml @  63 mls/hr TPN  CONT IV ;  

Start 3/22/20 at 22:00;  Stop 3/22/20 at 14:16;  Status DC


Sodium Chloride 90 meq/Potassium Chloride 15 meq/ Potassium Phosphate 15 mmol/ 

Magnesium Sulfate 10 meq/Calcium Gluconate 20 meq/ Multivitamins 10 ml/Chromium/

Copper/Manganese/ Seleni/Zn 0.5 ml/ Total Parenteral Nutrition/Amino 

Acids/Dextrose/ Fat Emulsion Intravenous 1,200 ml @  50 mls/hr TPN  CONT IV ;  

Start 3/22/20 at 22:00;  Stop 3/22/20 at 14:17;  Status DC


Sodium Chloride 90 meq/Potassium Chloride 15 meq/ Potassium Phosphate 10 mmol/ 

Magnesium Sulfate 10 meq/Calcium Gluconate 20 meq/ Multivitamins 10 ml/Chromium/

Copper/Manganese/ Seleni/Zn 0.5 ml/ Total Parenteral Nutrition/Amino Acid

s/Dextrose/ Fat Emulsion Intravenous 1,200 ml @  50 mls/hr TPN  CONT IV  Last 

administered on 3/22/20at 23:29;  Start 3/22/20 at 22:00;  Stop 3/23/20 at 

21:59;  Status DC


Sodium Chloride 1,000 ml @  1,000 mls/hr Q1H PRN IV hypotension;  Start 3/23/20 

at 07:28;  Stop 3/23/20 at 13:27;  Status DC


Albumin Human 200 ml @  200 mls/hr 1X  ONCE IV  Last administered on 3/23/20at 

08:51;  Start 3/23/20 at 07:30;  Stop 3/23/20 at 08:29;  Status DC


Diphenhydramine HCl (Benadryl) 25 mg 1X PRN  PRN IV ITCHING;  Start 3/23/20 at 

07:30;  Stop 3/24/20 at 07:29;  Status DC


Diphenhydramine HCl (Benadryl) 25 mg 1X PRN  PRN IV ITCHING;  Start 3/23/20 at 

07:30;  Stop 3/24/20 at 07:29;  Status DC


Sodium Chloride 1,000 ml @  400 mls/hr Q2H30M PRN IV PATENCY;  Start 3/23/20 at 

07:28;  Stop 3/23/20 at 19:27;  Status DC


Info (PHARMACY MONITORING -- do not chart) 1 each PRN DAILY  PRN MC SEE 

COMMENTS;  Start 3/23/20 at 07:30;  Stop 4/3/20 at 13:01;  Status DC


Metronidazole 100 ml @  100 mls/hr Q6HRS IV  Last administered on 4/8/20at 06

:26;  Start 3/23/20 at 08:30;  Stop 4/8/20 at 09:58;  Status DC


Micafungin Sodium 100 mg/Dextrose 100 ml @  100 mls/hr Q24H IV  Last 

administered on 4/14/20at 14:00;  Start 3/23/20 at 09:00


Propofol 0 ml @ As Directed STK-MED ONCE IV ;  Start 3/23/20 at 07:53;  Stop 

3/23/20 at 07:53;  Status DC


Etomidate (Amidate) 20 mg STK-MED ONCE IV ;  Start 3/23/20 at 07:53;  Stop 

3/23/20 at 07:54;  Status DC


Midazolam HCl (Versed) 5 mg STK-MED ONCE .ROUTE ;  Start 3/23/20 at 07:57;  Stop

3/23/20 at 07:57;  Status DC


Fentanyl Citrate 30 ml @ 0 mls/hr CONT  PRN IV SEE PROTOCOL Last administered on

4/15/20at 08:13;  Start 3/23/20 at 08:15


Artificial Tears (Artificial Tears) 1 drop PRN Q1HR  PRN OU DRY EYE, 1st choice;

 Start 3/23/20 at 08:15


Midazolam HCl 50 mg/Sodium Chloride 50 ml @ 0 mls/hr CONT  PRN IV SEE PROTOCOL 

Last administered on 3/26/20at 22:39;  Start 3/23/20 at 08:15;  Stop 3/28/20 at 

15:59;  Status DC


Etomidate (Amidate) 8 mg 1X  ONCE IV  Last administered on 3/23/20at 08:33;  

Start 3/23/20 at 08:30;  Stop 3/23/20 at 08:31;  Status DC


Succinylcholine Chloride (Anectine) 120 mg 1X  ONCE IV  Last administered on 

3/23/20at 08:34;  Start 3/23/20 at 08:30;  Stop 3/23/20 at 08:31;  Status DC


Midazolam HCl (Versed) 5 mg 1X  ONCE IV ;  Start 3/23/20 at 08:30;  Stop 3/23/20

at 08:31;  Status DC


Potassium Chloride 15 meq/ Bicarbonate Dialysis Soln w/ out KCl 5,007.5 ml  @ 

1,000 mls/ hr Q5H1M IV  Last administered on 3/24/20at 11:11;  Start 3/23/20 at 

12:00;  Stop 3/24/20 at 11:15;  Status DC


Potassium Chloride 15 meq/ Bicarbonate Dialysis Soln w/ out KCl 5,007.5 ml  @ 

1,000 mls/ hr Q5H1M IV  Last administered on 3/24/20at 11:12;  Start 3/23/20 at 

12:00;  Stop 3/24/20 at 11:17;  Status DC


Potassium Chloride 15 meq/ Bicarbonate Dialysis Soln w/ out KCl 5,007.5 ml  @ 

1,000 mls/ hr Q5H1M IV  Last administered on 3/24/20at 11:11;  Start 3/23/20 at 

12:00;  Stop 3/24/20 at 11:19;  Status DC


Sodium Chloride 90 meq/Potassium Chloride 15 meq/ Potassium Phosphate 10 mmol/ 

Magnesium Sulfate 10 meq/Calcium Gluconate 20 meq/ Multivitamins 10 ml/Chromium/

Copper/Manganese/ Seleni/Zn 0.5 ml/ Total Parenteral Nutrition/Amino 

Acids/Dextrose/ Fat Emulsion Intravenous 1,400 ml @  58.333 mls/ hr TPN  CONT IV

 Last administered on 3/23/20at 21:42;  Start 3/23/20 at 22:00;  Stop 3/24/20 at

21:59;  Status DC


Heparin Sodium (Porcine) (Heparin Sodium) 5,000 unit Q8HRS SQ  Last administered

on 3/28/20at 05:55;  Start 3/23/20 at 15:00;  Stop 3/28/20 at 13:28;  Status DC


Meropenem 500 mg/ Sodium Chloride 50 ml @  100 mls/hr Q6HRS IV  Last 

administered on 3/25/20at 06:00;  Start 3/24/20 at 09:00;  Stop 3/25/20 at 

07:29;  Status DC


Potassium Phosphate 20 mmol/ Sodium Chloride 106.6667 ml @  51.667 m... 1X  ONCE

IV  Last administered on 3/24/20at 11:22;  Start 3/24/20 at 10:15;  Stop 3/24/20

at 12:18;  Status DC


Acetaminophen (Tylenol Supp) 650 mg PRN Q6HRS  PRN MO MILD PAIN / TEMP Last 

administered on 4/14/20at 08:01;  Start 3/24/20 at 10:30


Potassium Chloride/Water 100 ml @  100 mls/hr Q1H IV  Last administered on 

3/24/20at 12:12;  Start 3/24/20 at 11:00;  Stop 3/24/20 at 12:59;  Status DC


Potassium Chloride 20 meq/ Bicarbonate Dialysis Soln w/ out KCl 5,010 ml @  

1,000 mls/hr Q5H1M IV  Last administered on 3/25/20at 08:48;  Start 3/24/20 at 

12:00;  Stop 3/25/20 at 13:03;  Status DC


Potassium Chloride 20 meq/ Bicarbonate Dialysis Soln w/ out KCl 5,010 ml @  

1,000 mls/hr Q5H1M IV  Last administered on 3/29/20at 14:52;  Start 3/24/20 at 

11:30;  Stop 3/29/20 at 19:59;  Status DC


Potassium Chloride 20 meq/ Bicarbonate Dialysis Soln w/ out KCl 5,010 ml @  

1,000 mls/hr Q5H1M IV  Last administered on 3/29/20at 14:53;  Start 3/24/20 at 

11:30;  Stop 3/29/20 at 19:59;  Status DC


Sodium Chloride 90 meq/Potassium Chloride 15 meq/ Potassium Phosphate 15 mmol/ 

Magnesium Sulfate 10 meq/Calcium Gluconate 15 meq/ Multivitamins 10 ml/Chromium/

Copper/Manganese/ Seleni/Zn 0.5 ml/ Total Parenteral Nutrition/Amino 

Acids/Dextrose/ Fat Emulsion Intravenous 1,400 ml @  58.333 mls/ hr TPN  CONT IV

 Last administered on 3/24/20at 22:17;  Start 3/24/20 at 22:00;  Stop 3/25/20 at

21:59;  Status DC


Cefepime HCl (Maxipime) 2 gm Q12HR IVP  Last administered on 4/7/20at 20:56;  

Start 3/25/20 at 09:00;  Stop 4/8/20 at 09:58;  Status DC


Daptomycin 500 mg/ Sodium Chloride 50 ml @  100 mls/hr Q48H IV  Last 

administered on 4/10/20at 09:57;  Start 3/25/20 at 08:30;  Stop 4/10/20 at 

10:07;  Status DC


Lidocaine HCl (Buffered Lidocaine 1%) 3 ml 1X  ONCE INJ  Last administered on 

3/25/20at 10:27;  Start 3/25/20 at 10:30;  Stop 3/25/20 at 10:31;  Status DC


Potassium Phosphate 20 mmol/ Sodium Chloride 106.6667 ml @  51.667 m... 1X  ONCE

IV  Last administered on 3/25/20at 12:51;  Start 3/25/20 at 13:00;  Stop 3/25/20

at 15:03;  Status DC


Sodium Chloride 90 meq/Potassium Chloride 15 meq/ Potassium Phosphate 18 mmol/ 

Magnesium Sulfate 8 meq/Calcium Gluconate 15 meq/ Multivitamins 10 ml/Chromium/ 

Copper/Manganese/ Seleni/Zn 0.5 ml/ Total Parenteral Nutrition/Amino 

Acids/Dextrose/ Fat Emulsion Intravenous 1,400 ml @  58.333 mls/ hr TPN  CONT IV

 Last administered on 3/25/20at 22:16;  Start 3/25/20 at 22:00;  Stop 3/26/20 at

21:59;  Status DC


Potassium Chloride 20 meq/ Bicarbonate Dialysis Soln w/ out KCl 5,010 ml @  

1,000 mls/hr Q5H1M IV  Last administered on 3/29/20at 14:54;  Start 3/25/20 at 

16:00;  Stop 3/29/20 at 19:59;  Status DC


Multi-Ingred Cream/Lotion/Oil/ Oint (Artificial Tears Eye Ointment) 1 thomas PRN 

Q1HR  PRN OU DRY EYE, 2nd choice Last administered on 4/13/20at 08:19;  Start 3

/25/20 at 17:30


Sodium Chloride 90 meq/Potassium Chloride 15 meq/ Potassium Phosphate 18 mmol/ 

Magnesium Sulfate 8 meq/Calcium Gluconate 15 meq/ Multivitamins 10 ml/Chromium/ 

Copper/Manganese/ Seleni/Zn 0.5 ml/ Total Parenteral Nutrition/Amino 

Acids/Dextrose/ Fat Emulsion Intravenous 1,400 ml @  58.333 mls/ hr TPN  CONT IV

 Last administered on 3/26/20at 22:00;  Start 3/26/20 at 22:00;  Stop 3/27/20 at

21:59;  Status DC


Albumin Human 500 ml @  125 mls/hr 1X  ONCE IV ;  Start 3/26/20 at 14:15;  Stop 

3/26/20 at 18:14;  Status DC


Sodium Chloride 90 meq/Potassium Chloride 15 meq/ Potassium Phosphate 18 mmol/ 

Magnesium Sulfate 8 meq/Calcium Gluconate 15 meq/ Multivitamins 10 ml/Chromium/ 

Copper/Manganese/ Seleni/Zn 0.5 ml/ Insulin Human Regular 10 unit/ Total Par

enteral Nutrition/Amino Acids/Dextrose/ Fat Emulsion Intravenous 1,400 ml @  

58.333 mls/ hr TPN  CONT IV  Last administered on 3/27/20at 21:43;  Start 

3/27/20 at 22:00;  Stop 3/28/20 at 21:59;  Status DC


Lidocaine HCl (Buffered Lidocaine 1%) 3 ml STK-MED ONCE .ROUTE ;  Start 3/25/20 

at 10:00;  Stop 3/27/20 at 13:57;  Status DC


Midazolam HCl 100 mg/Sodium Chloride 100 ml @ 7 mls/hr CONT  PRN IV SEE PROTOCOL

Last administered on 4/8/20at 15:35;  Start 3/28/20 at 16:00


Sodium Chloride 90 meq/Potassium Chloride 15 meq/ Potassium Phosphate 18 mmol/ 

Magnesium Sulfate 8 meq/Calcium Gluconate 15 meq/ Multivitamins 10 ml/Chromium/ 

Copper/Manganese/ Seleni/Zn 0.5 ml/ Insulin Human Regular 15 unit/ Total 

Parenteral Nutrition/Amino Acids/Dextrose/ Fat Emulsion Intravenous 1,400 ml @  

58.333 mls/ hr TPN  CONT IV  Last administered on 3/28/20at 20:34;  Start 

3/28/20 at 22:00;  Stop 3/29/20 at 21:59;  Status DC


Info ([facility] Electrolyte Protocol) 1 ea CONT PRN  PRN MC PER PROTOCOL;  Start 

3/29/20 at 13:15


Sodium Chloride 90 meq/Potassium Chloride 15 meq/ Potassium Phosphate 18 mmol/ 

Magnesium Sulfate 8 meq/Calcium Gluconate 15 meq/ Multivitamins 10 ml/Chromium/ 

Copper/Manganese/ Seleni/Zn 0.5 ml/ Insulin Human Regular 15 unit/ Total 

Parenteral Nutrition/Amino Acids/Dextrose/ Fat Emulsion Intravenous 1,400 ml @  

58.333 mls/ hr TPN  CONT IV  Last administered on 3/29/20at 22:05;  Start 

3/29/20 at 22:00;  Stop 3/30/20 at 21:59;  Status DC


Potassium Chloride 15 meq/ Bicarbonate Dialysis Soln w/ out KCl 5,007.5 ml  @ 

1,000 mls/ hr Q5H1M IV  Last administered on 4/1/20at 18:14;  Start 3/29/20 at 

20:00;  Stop 4/2/20 at 13:08;  Status DC


Potassium Chloride 15 meq/ Bicarbonate Dialysis Soln w/ out KCl 5,007.5 ml  @ 

1,000 mls/ hr Q5H1M IV  Last administered on 4/1/20at 18:14;  Start 3/29/20 at 

20:00;  Stop 4/2/20 at 13:08;  Status DC


Potassium Chloride 15 meq/ Bicarbonate Dialysis Soln w/ out KCl 5,007.5 ml  @ 

1,000 mls/ hr Q5H1M IV  Last administered on 4/1/20at 18:14;  Start 3/29/20 at 

20:00;  Stop 4/2/20 at 13:08;  Status DC


Iohexol (Omnipaque 240 Mg/ml) 30 ml 1X  ONCE PO  Last administered on 3/30/20at 

11:30;  Start 3/30/20 at 11:30;  Stop 3/30/20 at 11:33;  Status DC


Info (CONTRAST GIVEN -- Rx MONITORING) 1 each PRN DAILY  PRN MC SEE COMMENTS;  

Start 3/30/20 at 11:45;  Stop 4/1/20 at 11:44;  Status DC


Sodium Chloride 90 meq/Potassium Chloride 15 meq/ Potassium Phosphate 18 mmol/ 

Magnesium Sulfate 8 meq/Calcium Gluconate 15 meq/ Multivitamins 10 ml/Chromium/ 

Copper/Manganese/ Seleni/Zn 0.5 ml/ Insulin Human Regular 15 unit/ Total 

Parenteral Nutrition/Amino Acids/Dextrose/ Fat Emulsion Intravenous 1,400 ml @  

58.333 mls/ hr TPN  CONT IV  Last administered on 3/30/20at 21:47;  Start 

3/30/20 at 22:00;  Stop 3/31/20 at 21:59;  Status DC


Sodium Chloride 90 meq/Potassium Chloride 15 meq/ Potassium Phosphate 18 mmol/ 

Magnesium Sulfate 8 meq/Calcium Gluconate 15 meq/ Multivitamins 10 ml/Chromium/ 

Copper/Manganese/ Seleni/Zn 0.5 ml/ Insulin Human Regular 20 unit/ Total 

Parenteral Nutrition/Amino Acids/Dextrose/ Fat Emulsion Intravenous 1,400 ml @  

58.333 mls/ hr TPN  CONT IV  Last administered on 3/31/20at 21:36;  Start 

3/31/20 at 22:00;  Stop 4/1/20 at 21:59;  Status DC


Alteplase, Recombinant (Cathflo For Central Catheter Clearance) 1 mg 1X  ONCE 

INT CAT  Last administered on 3/31/20at 20:03;  Start 3/31/20 at 19:30;  Stop 

3/31/20 at 19:46;  Status DC


Alteplase, Recombinant (Cathflo For Central Catheter Clearance) 1 mg 1X  ONCE 

INT CAT  Last administered on 3/31/20at 22:05;  Start 3/31/20 at 22:00;  Stop 

3/31/20 at 22:01;  Status DC


Sodium Chloride 90 meq/Potassium Chloride 15 meq/ Potassium Phosphate 18 mmol/ 

Magnesium Sulfate 8 meq/Calcium Gluconate 15 meq/ Multivitamins 10 ml/Chromium/ 

Copper/Manganese/ Seleni/Zn 0.5 ml/ Insulin Human Regular 20 unit/ Total 

Parenteral Nutrition/Amino Acids/Dextrose/ Fat Emulsion Intravenous 1,400 ml @  

58.333 mls/ hr TPN  CONT IV  Last administered on 4/1/20at 21:30;  Start 4/1/20 

at 22:00;  Stop 4/2/20 at 21:59;  Status DC


Dexmedetomidine HCl 400 mcg/ Sodium Chloride 100 ml @ 0 mls/hr CONT  PRN IV 

ANXIETY / AGITATION Last administered on 4/15/20at 08:05;  Start 4/2/20 at 08:15


Sodium Chloride 500 ml @  500 mls/hr 1X PRN  PRN IV ELEVATED BP, SEE COMMENTS;  

Start 4/2/20 at 08:15


Atropine Sulfate (ATROPINE 0.5mg SYRINGE) 0.5 mg PRN Q5MIN  PRN IV SEE COMMENTS;

 Start 4/2/20 at 08:15


Furosemide (Lasix) 20 mg 1X  ONCE IVP  Last administered on 4/2/20at 08:19;  

Start 4/2/20 at 08:15;  Stop 4/2/20 at 08:16;  Status DC


Lidocaine HCl (Buffered Lidocaine 1%) 3 ml STK-MED ONCE .ROUTE ;  Start 4/2/20 

at 08:39;  Stop 4/2/20 at 08:39;  Status DC


Lidocaine HCl (Buffered Lidocaine 1%) 6 ml 1X  ONCE INJ  Last administered on 

4/2/20at 09:05;  Start 4/2/20 at 09:00;  Stop 4/2/20 at 09:06;  Status DC


Sodium Chloride 90 meq/Potassium Chloride 15 meq/ Potassium Phosphate 18 mmol/ 

Magnesium Sulfate 8 meq/Calcium Gluconate 15 meq/ Multivitamins 10 ml/Chromium/ 

Copper/Manganese/ Seleni/Zn 0.5 ml/ Insulin Human Regular 20 unit/ Total 

Parenteral Nutrition/Amino Acids/Dextrose/ Fat Emulsion Intravenous 1,400 ml @  

58.333 mls/ hr TPN  CONT IV  Last administered on 4/2/20at 22:45;  Start 4/2/20 

at 22:00;  Stop 4/3/20 at 21:59;  Status DC


Sodium Chloride 1,000 ml @  1,000 mls/hr Q1H PRN IV hypotension;  Start 4/3/20 

at 07:30;  Stop 4/3/20 at 13:29;  Status DC


Albumin Human 200 ml @  200 mls/hr 1X PRN  PRN IV Hypotension Last administered 

on 4/3/20at 09:36;  Start 4/3/20 at 07:30;  Stop 4/3/20 at 13:29;  Status DC


Sodium Chloride (Normal Saline Flush) 10 ml 1X PRN  PRN IV AP catheter pack;  

Start 4/3/20 at 07:30;  Stop 4/3/20 at 21:29;  Status DC


Sodium Chloride (Normal Saline Flush) 10 ml 1X PRN  PRN IV  catheter pack;  

Start 4/3/20 at 07:30;  Stop 4/4/20 at 07:29;  Status DC


Sodium Chloride 1,000 ml @  400 mls/hr Q2H30M PRN IV PATENCY;  Start 4/3/20 at 

07:30;  Stop 4/3/20 at 19:29;  Status DC


Info (PHARMACY MONITORING -- do not chart) 1 each PRN DAILY  PRN MC SEE 

COMMENTS;  Start 4/3/20 at 07:30;  Stop 4/3/20 at 13:02;  Status DC


Info (PHARMACY MONITORING -- do not chart) 1 each PRN DAILY  PRN MC SEE 

COMMENTS;  Start 4/3/20 at 07:30;  Stop 4/5/20 at 12:45;  Status DC


Sodium Chloride 90 meq/Potassium Chloride 15 meq/ Potassium Phosphate 10 mmol/ 

Magnesium Sulfate 8 meq/Calcium Gluconate 15 meq/ Multivitamins 10 ml/Chromium/ 

Copper/Manganese/ Seleni/Zn 0.5 ml/ Insulin Human Regular 25 unit/ Total 

Parenteral Nutrition/Amino Acids/Dextrose/ Fat Emulsion Intravenous 1,400 ml @  

58.333 mls/ hr TPN  CONT IV  Last administered on 4/3/20at 22:19;  Start 4/3/20 

at 22:00;  Stop 4/4/20 at 21:59;  Status DC


Heparin Sodium (Porcine) (Heparin Sodium) 5,000 unit Q12HR SQ  Last administered

on 4/15/20at 07:42;  Start 4/3/20 at 21:00


Ondansetron HCl (Zofran) 4 mg PRN Q6HRS  PRN IV NAUSEA/VOMITING;  Start 4/6/20 

at 07:00;  Stop 4/7/20 at 06:59;  Status DC


Fentanyl Citrate (Fentanyl 2ml Vial) 25 mcg PRN Q5MIN  PRN IV MILD PAIN 1-3;  

Start 4/6/20 at 07:00;  Stop 4/7/20 at 06:59;  Status DC


Fentanyl Citrate (Fentanyl 2ml Vial) 50 mcg PRN Q5MIN  PRN IV MODERATE TO SEVERE

PAIN;  Start 4/6/20 at 07:00;  Stop 4/7/20 at 06:59;  Status DC


Ringer's Solution 1,000 ml @  30 mls/hr Q24H IV ;  Start 4/6/20 at 07:00;  Stop 

4/6/20 at 18:59;  Status DC


Lidocaine HCl (Xylocaine-Mpf 1% 2ml Vial) 2 ml PRN 1X  PRN ID PRIOR TO IV START;

 Start 4/6/20 at 07:00;  Stop 4/7/20 at 06:59;  Status DC


Prochlorperazine Edisylate (Compazine) 5 mg PACU PRN  PRN IV NAUSEA, MRX1;  

Start 4/6/20 at 07:00;  Stop 4/7/20 at 06:59;  Status DC


Sodium Chloride 1,000 ml @  1,000 mls/hr Q1H PRN IV hypotension;  Start 4/4/20 

at 09:10;  Stop 4/4/20 at 15:09;  Status DC


Albumin Human 200 ml @  200 mls/hr 1X PRN  PRN IV Hypotension Last administered 

on 4/4/20at 10:10;  Start 4/4/20 at 09:15;  Stop 4/4/20 at 15:14;  Status DC


Sodium Chloride 1,000 ml @  400 mls/hr Q2H30M PRN IV PATENCY;  Start 4/4/20 at 

09:10;  Stop 4/4/20 at 21:09;  Status DC


Info (PHARMACY MONITORING -- do not chart) 1 each PRN DAILY  PRN MC SEE 

COMMENTS;  Start 4/4/20 at 09:15;  Stop 4/5/20 at 12:45;  Status DC


Info (PHARMACY MONITORING -- do not chart) 1 each PRN DAILY  PRN MC SEE 

COMMENTS;  Start 4/4/20 at 09:15;  Stop 4/5/20 at 12:45;  Status DC


Sodium Chloride 90 meq/Potassium Chloride 15 meq/ Potassium Phosphate 10 mmol/ 

Magnesium Sulfate 8 meq/Calcium Gluconate 15 meq/ Multivitamins 10 ml/Chromium/ 

Copper/Manganese/ Seleni/Zn 0.5 ml/ Insulin Human Regular 25 unit/ Total 

Parenteral Nutrition/Amino Acids/Dextrose/ Fat Emulsion Intravenous 1,400 ml @  

58.333 mls/ hr TPN  CONT IV  Last administered on 4/4/20at 22:10;  Start 4/4/20 

at 22:00;  Stop 4/5/20 at 21:59;  Status DC


Magnesium Sulfate 50 ml @ 25 mls/hr PRN DAILY  PRN IV for Mag < 1.7 on am labs; 

Start 4/5/20 at 09:15


Sodium Chloride 90 meq/Potassium Chloride 15 meq/ Potassium Phosphate 10 mmol/ 

Magnesium Sulfate 8 meq/Calcium Gluconate 15 meq/ Multivitamins 10 ml/Chromium/ 

Copper/Manganese/ Seleni/Zn 0.5 ml/ Insulin Human Regular 25 unit/ Total Parent

eral Nutrition/Amino Acids/Dextrose/ Fat Emulsion Intravenous 1,400 ml @  58.333

mls/ hr TPN  CONT IV  Last administered on 4/5/20at 21:20;  Start 4/5/20 at 

22:00;  Stop 4/6/20 at 21:59;  Status DC


Sodium Chloride 1,000 ml @  1,000 mls/hr Q1H PRN IV hypotension;  Start 4/5/20 

at 12:23;  Stop 4/5/20 at 18:22;  Status DC


Albumin Human 200 ml @  200 mls/hr 1X  ONCE IV  Last administered on 4/5/20at 

13:34;  Start 4/5/20 at 12:30;  Stop 4/5/20 at 13:29;  Status DC


Diphenhydramine HCl (Benadryl) 25 mg 1X PRN  PRN IV ITCHING;  Start 4/5/20 at 

12:30;  Stop 4/6/20 at 12:29;  Status DC


Diphenhydramine HCl (Benadryl) 25 mg 1X PRN  PRN IV ITCHING;  Start 4/5/20 at 

12:30;  Stop 4/6/20 at 12:29;  Status DC


Info (PHARMACY MONITORING -- do not chart) 1 each PRN DAILY  PRN MC SEE 

COMMENTS;  Start 4/5/20 at 12:30;  Status Cancel


Bupivacaine HCl/ Epinephrine Bitart (Sensorcain-Epi 0.5%-1:616314 Mpf) 30 ml 

STK-MED ONCE .ROUTE  Last administered on 4/6/20at 11:44;  Start 4/6/20 at 

11:00;  Stop 4/6/20 at 11:01;  Status DC


Cellulose (Surgicel Fibrillar 1x2) 1 each STK-MED ONCE .ROUTE ;  Start 4/6/20 at

11:00;  Stop 4/6/20 at 11:01;  Status DC


Sodium Chloride 90 meq/Potassium Chloride 15 meq/ Potassium Phosphate 10 mmol/ 

Magnesium Sulfate 12 meq/Calcium Gluconate 15 meq/ Multivitamins 10 ml/Chromium/

Copper/Manganese/ Seleni/Zn 0.5 ml/ Insulin Human Regular 25 unit/ Total 

Parenteral Nutrition/Amino Acids/Dextrose/ Fat Emulsion Intravenous 1,400 ml @  

58.333 mls/ hr TPN  CONT IV  Last administered on 4/6/20at 22:24;  Start 4/6/20 

at 22:00;  Stop 4/7/20 at 21:59;  Status DC


Propofol 20 ml @ As Directed STK-MED ONCE IV ;  Start 4/6/20 at 11:07;  Stop 

4/6/20 at 11:07;  Status DC


Cellulose (Surgicel Hemostat 4x8) 1 each STK-MED ONCE .ROUTE  Last administered 

on 4/6/20at 11:44;  Start 4/6/20 at 11:55;  Stop 4/6/20 at 11:56;  Status DC


Sevoflurane (Ultane) 60 ml STK-MED ONCE IH ;  Start 4/6/20 at 12:46;  Stop 

4/6/20 at 12:46;  Status DC


Sodium Chloride 1,000 ml @  1,000 mls/hr Q1H PRN IV hypotension;  Start 4/6/20 

at 13:51;  Stop 4/6/20 at 19:50;  Status DC


Albumin Human 200 ml @  200 mls/hr 1X PRN  PRN IV Hypotension Last administered 

on 4/6/20at 14:51;  Start 4/6/20 at 14:00;  Stop 4/6/20 at 19:59;  Status DC


Diphenhydramine HCl (Benadryl) 25 mg 1X PRN  PRN IV ITCHING;  Start 4/6/20 at 

14:00;  Stop 4/7/20 at 13:59;  Status DC


Diphenhydramine HCl (Benadryl) 25 mg 1X PRN  PRN IV ITCHING;  Start 4/6/20 at 

14:00;  Stop 4/7/20 at 13:59;  Status DC


Sodium Chloride 1,000 ml @  400 mls/hr Q2H30M PRN IV PATENCY;  Start 4/6/20 at 

13:51;  Stop 4/7/20 at 01:50;  Status DC


Info (PHARMACY MONITORING -- do not chart) 1 each PRN DAILY  PRN MC SEE 

COMMENTS;  Start 4/6/20 at 14:00;  Stop 4/9/20 at 08:16;  Status DC


Heparin Sodium (Porcine) (Hep Lock Adult) 500 unit STK-MED ONCE IVP ;  Start 

4/7/20 at 09:29;  Stop 4/7/20 at 09:30;  Status DC


Sodium Chloride 1,000 ml @  1,000 mls/hr Q1H PRN IV hypotension;  Start 4/7/20 

at 10:43;  Stop 4/7/20 at 16:42;  Status DC


Sodium Chloride 1,000 ml @  400 mls/hr Q2H30M PRN IV PATENCY;  Start 4/7/20 at 

10:43;  Stop 4/7/20 at 22:42;  Status DC


Info (PHARMACY MONITORING -- do not chart) 1 each PRN DAILY  PRN MC SEE 

COMMENTS;  Start 4/7/20 at 10:45;  Status UNV


Info (PHARMACY MONITORING -- do not chart) 1 each PRN DAILY  PRN MC SEE 

COMMENTS;  Start 4/7/20 at 10:45;  Status UNV


Sodium Chloride 90 meq/Potassium Chloride 15 meq/ Magnesium Sulfate 12 

meq/Calcium Gluconate 15 meq/ Multivitamins 10 ml/Chromium/ Copper/Manganese/ 

Seleni/Zn 0.5 ml/ Insulin Human Regular 25 unit/ Total Parenteral 

Nutrition/Amino Acids/Dextrose/ Fat Emulsion Intravenous 1,400 ml @  58.333 mls/

hr TPN  CONT IV  Last administered on 4/7/20at 22:13;  Start 4/7/20 at 22:00;  

Stop 4/8/20 at 21:59;  Status DC


Sodium Chloride 1,000 ml @  1,000 mls/hr Q1H PRN IV hypotension;  Start 4/8/20 

at 07:50;  Stop 4/8/20 at 13:49;  Status DC


Albumin Human 200 ml @  200 mls/hr 1X  ONCE IV ;  Start 4/8/20 at 08:00;  Stop 

4/8/20 at 08:53;  Status DC


Diphenhydramine HCl (Benadryl) 25 mg 1X PRN  PRN IV ITCHING;  Start 4/8/20 at 

08:00;  Stop 4/9/20 at 07:59;  Status DC


Diphenhydramine HCl (Benadryl) 25 mg 1X PRN  PRN IV ITCHING;  Start 4/8/20 at 

08:00;  Stop 4/9/20 at 07:59;  Status DC


Info (PHARMACY MONITORING -- do not chart) 1 each PRN DAILY  PRN MC SEE 

COMMENTS;  Start 4/8/20 at 08:00;  Stop 4/9/20 at 08:16;  Status DC


Albumin Human 50 ml @ 50 mls/hr 1X  ONCE IV ;  Start 4/8/20 at 08:53;  Stop 

4/8/20 at 08:56;  Status DC


Albumin Human 200 ml @  50 mls/hr PRN 1X  PRN IV HYPOTENSION Last administered 

on 4/14/20at 11:54;  Start 4/8/20 at 09:00


Meropenem 500 mg/ Sodium Chloride 50 ml @  100 mls/hr Q12H IV  Last administered

on 4/14/20at 21:54;  Start 4/8/20 at 10:00


Sodium Chloride 90 meq/Magnesium Sulfate 12 meq/ Calcium Gluconate 15 meq/ 

Multivitamins 10 ml/Chromium/ Copper/Manganese/ Seleni/Zn 0.5 ml/ Insulin Human 

Regular 25 unit/ Total Parenteral Nutrition/Amino Acids/Dextrose/ Fat Emulsion 

Intravenous 1,400 ml @  58.333 mls/ hr TPN  CONT IV  Last administered on 

4/8/20at 21:41;  Start 4/8/20 at 22:00;  Stop 4/9/20 at 21:59;  Status DC


Sodium Chloride 1,000 ml @  1,000 mls/hr Q1H PRN IV hypotension;  Start 4/9/20 

at 07:58;  Stop 4/9/20 at 13:57;  Status DC


Albumin Human 200 ml @  200 mls/hr 1X PRN  PRN IV Hypotension Last administered 

on 4/9/20at 09:30;  Start 4/9/20 at 08:00;  Stop 4/9/20 at 13:59;  Status DC


Sodium Chloride 1,000 ml @  400 mls/hr Q2H30M PRN IV PATENCY;  Start 4/9/20 at 

07:58;  Stop 4/9/20 at 19:57;  Status DC


Info (PHARMACY MONITORING -- do not chart) 1 each PRN DAILY  PRN MC SEE 

COMMENTS;  Start 4/9/20 at 08:00;  Status Cancel


Info (PHARMACY MONITORING -- do not chart) 1 each PRN DAILY  PRN MC SEE 

COMMENTS;  Start 4/9/20 at 08:15;  Status UNV


Sodium Chloride 90 meq/Potassium Phosphate 5 mmol/ Magnesium Sulfate 12 

meq/Calcium Gluconate 15 meq/ Multivitamins 10 ml/Chromium/ Copper/Manganese/ 

Seleni/Zn 0.5 ml/ Insulin Human Regular 30 unit/ Total Parenteral 

Nutrition/Amino Acids/Dextrose/ Fat Emulsion Intravenous 1,400 ml @  58.333 mls/

hr TPN  CONT IV  Last administered on 4/9/20at 22:08;  Start 4/9/20 at 22:00;  

Stop 4/10/20 at 21:59;  Status DC


Linezolid/Dextrose 300 ml @  300 mls/hr Q12HR IV  Last administered on 4/15/20at

07:38;  Start 4/10/20 at 11:00


Sodium Chloride 90 meq/Potassium Phosphate 15 mmol/ Magnesium Sulfate 12 

meq/Calcium Gluconate 15 meq/ Multivitamins 10 ml/Chromium/ Copper/Manganese/ 

Seleni/Zn 0.5 ml/ Insulin Human Regular 30 unit/ Total Parenteral 

Nutrition/Amino Acids/Dextrose/ Fat Emulsion Intravenous 1,400 ml @  58.333 mls/

hr TPN  CONT IV  Last administered on 4/10/20at 21:49;  Start 4/10/20 at 22:00; 

Stop 4/11/20 at 21:59;  Status DC


Sodium Chloride 90 meq/Potassium Phosphate 15 mmol/ Magnesium Sulfate 12 

meq/Calcium Gluconate 15 meq/ Multivitamins 10 ml/Chromium/ Copper/Manganese/ 

Seleni/Zn 0.5 ml/ Insulin Human Regular 40 unit/ Total Parenteral 

Nutrition/Amino Acids/Dextrose/ Fat Emulsion Intravenous 1,400 ml @  58.333 mls/

hr TPN  CONT IV  Last administered on 4/11/20at 21:21;  Start 4/11/20 at 22:00; 

Stop 4/12/20 at 21:59;  Status DC


Sodium Chloride 1,000 ml @  1,000 mls/hr Q1H PRN IV hypotension;  Start 4/11/20 

at 13:26;  Stop 4/11/20 at 19:25;  Status DC


Albumin Human 200 ml @  200 mls/hr 1X PRN  PRN IV Hypotension Last administered 

on 4/11/20at 15:00;  Start 4/11/20 at 13:30;  Stop 4/11/20 at 19:29;  Status DC


Sodium Chloride (Normal Saline Flush) 10 ml 1X PRN  PRN IV AP catheter pack;  S

tart 4/11/20 at 13:30;  Stop 4/12/20 at 13:29;  Status DC


Sodium Chloride (Normal Saline Flush) 10 ml 1X PRN  PRN IV  catheter pack;  

Start 4/11/20 at 13:30;  Stop 4/12/20 at 13:29;  Status DC


Sodium Chloride 1,000 ml @  400 mls/hr Q2H30M PRN IV PATENCY;  Start 4/11/20 at 

13:26;  Stop 4/12/20 at 01:25;  Status DC


Info (PHARMACY MONITORING -- do not chart) 1 each PRN DAILY  PRN MC SEE 

COMMENTS;  Start 4/11/20 at 13:30;  Stop 4/11/20 at 13:33;  Status DC


Info (PHARMACY MONITORING -- do not chart) 1 each PRN DAILY  PRN MC SEE 

COMMENTS;  Start 4/11/20 at 13:30;  Stop 4/11/20 at 13:34;  Status DC


Sodium Chloride 90 meq/Potassium Phosphate 19 mmol/ Magnesium Sulfate 12 

meq/Calcium Gluconate 15 meq/ Multivitamins 10 ml/Chromium/ Copper/Manganese/ 

Seleni/Zn 0.5 ml/ Insulin Human Regular 40 unit/ Total Parenteral 

Nutrition/Amino Acids/Dextrose/ Fat Emulsion Intravenous 1,400 ml @  58.333 mls/

hr TPN  CONT IV  Last administered on 4/12/20at 21:54;  Start 4/12/20 at 22:00; 

Stop 4/13/20 at 21:59;  Status DC


Sodium Chloride 1,000 ml @  1,000 mls/hr Q1H PRN IV hypotension;  Start 4/13/20 

at 09:35;  Stop 4/13/20 at 15:34;  Status DC


Albumin Human 200 ml @  200 mls/hr 1X PRN  PRN IV Hypotension;  Start 4/13/20 at

09:45;  Stop 4/13/20 at 15:44;  Status DC


Diphenhydramine HCl (Benadryl) 25 mg 1X PRN  PRN IV ITCHING;  Start 4/13/20 at 

09:45;  Stop 4/14/20 at 09:44;  Status DC


Diphenhydramine HCl (Benadryl) 25 mg 1X PRN  PRN IV ITCHING;  Start 4/13/20 at 

09:45;  Stop 4/14/20 at 09:44;  Status DC


Sodium Chloride 1,000 ml @  400 mls/hr Q2H30M PRN IV PATENCY;  Start 4/13/20 at 

09:35;  Stop 4/13/20 at 21:34;  Status DC


Info (PHARMACY MONITORING -- do not chart) 1 each PRN DAILY  PRN MC SEE 

COMMENTS;  Start 4/13/20 at 09:45;  Status Cancel


Sodium Chloride 100 meq/Potassium Phosphate 19 mmol/ Magnesium Sulfate 12 

meq/Calcium Gluconate 15 meq/ Multivitamins 10 ml/Chromium/ Copper/Manganese/ 

Seleni/Zn 0.5 ml/ Insulin Human Regular 40 unit/ Potassium Chloride 20 meq/ 

Total Parenteral Nutrition/Amino Acids/Dextrose/ Fat Emulsion Intravenous 1,400 

ml @  58.333 mls/ hr TPN  CONT IV  Last administered on 4/13/20at 22:02;  Start 

4/13/20 at 22:00;  Stop 4/14/20 at 21:59;  Status DC


Furosemide (Lasix) 40 mg 1X  ONCE IVP  Last administered on 4/13/20at 14:39;  

Start 4/13/20 at 14:30;  Stop 4/13/20 at 14:31;  Status DC


Metronidazole 100 ml @  100 mls/hr Q8HRS IV  Last administered on 4/15/20at 

05:43;  Start 4/14/20 at 10:00


Sodium Chloride 1,000 ml @  1,000 mls/hr Q1H PRN IV hypotension;  Start 4/14/20 

at 08:00;  Stop 4/14/20 at 13:59;  Status DC


Albumin Human 200 ml @  200 mls/hr 1X PRN  PRN IV Hypotension;  Start 4/14/20 at

08:00;  Stop 4/14/20 at 13:59;  Status DC


Sodium Chloride 1,000 ml @  400 mls/hr Q2H30M PRN IV PATENCY;  Start 4/14/20 at 

08:00;  Stop 4/14/20 at 19:59;  Status DC


Info (PHARMACY MONITORING -- do not chart) 1 each PRN DAILY  PRN MC SEE 

COMMENTS;  Start 4/14/20 at 11:30;  Status UNV


Info (PHARMACY MONITORING -- do not chart) 1 each PRN DAILY  PRN MC SEE 

COMMENTS;  Start 4/14/20 at 11:30


Sodium Chloride 100 meq/Potassium Phosphate 19 mmol/ Magnesium Sulfate 12 

meq/Calcium Gluconate 15 meq/ Multivitamins 10 ml/Chromium/ Copper/Manganese/ 

Seleni/Zn 0.5 ml/ Insulin Human Regular 40 unit/ Potassium Chloride 20 meq/ 

Total Parenteral Nutrition/Amino Acids/Dextrose/ Fat Emulsion Intravenous 1,400 

ml @  58.333 mls/ hr TPN  CONT IV  Last administered on 4/14/20at 21:52;  Start 

4/14/20 at 22:00;  Stop 4/15/20 at 21:59


Sodium Chloride (Normal Saline Flush) 10 ml QSHIFT  PRN IV AFTER MEDS AND BLOOD 

DRAWS;  Start 4/14/20 at 15:00


Sodium Chloride (Normal Saline Flush) 10 ml PRN Q5MIN  PRN IV AFTER MEDS AND 

BLOOD DRAWS;  Start 4/14/20 at 15:00


Sodium Chloride (Normal Saline Flush) 20 ml PRN Q5MIN  PRN IV AFTER MEDS AND 

BLOOD DRAWS;  Start 4/14/20 at 15:00





Active Scripts


Active


Reported


Bisoprolol Fumarate 5 Mg Tablet 10 Mg PO DAILY


Vitals/I & O





Vital Sign - Last 24 Hours








 4/14/20 4/14/20 4/14/20 4/14/20





 09:00 10:52 11:50 13:30


 


Temp 100.4   





 100.4   


 


Pulse 115 134  


 


Resp 30 36  


 


B/P (MAP) 156/84 (108) 153/75 (101)  


 


Pulse Ox 99 99 100 


 


O2 Delivery Ventilator Ventilator Ventilator Mechanical Ventilator


 


    





    





 4/14/20 4/14/20 4/14/20 4/14/20





 13:35 13:50 14:30 15:00


 


Temp 98.2   





 98.2   


 


Pulse 126 124 122 92


 


Resp 31 32 30 18


 


B/P (MAP) 153/80 (104) 165/102 (123) 135/84 (101) 107/55 (72)


 


Pulse Ox 100 100 100 99


 


O2 Delivery Ventilator Ventilator Ventilator Ventilator


 


    





    





 4/14/20 4/14/20 4/14/20 4/14/20





 15:16 16:00 16:07 16:20


 


Temp  98.6  





  98.6  


 


Pulse  96  


 


Resp  19  19


 


B/P (MAP)  101/61 (74)  


 


Pulse Ox 100 99  99


 


O2 Delivery Ventilator Ventilator Mechanical Ventilator Ventilator


 


    





    





 4/14/20 4/14/20 4/14/20 4/14/20





 16:50 17:00 18:00 19:00


 


Temp    98.9





    98.9


 


Pulse  82 85 85


 


Resp 19 20 22 20


 


B/P (MAP)  104/59 (74) 112/70 (84) 118/65 (82)


 


Pulse Ox 100 100 100 100


 


O2 Delivery Ventilator Ventilator Ventilator Ventilator


 


    





    





 4/14/20 4/14/20 4/14/20 4/14/20





 20:00 20:00 20:19 21:00


 


Pulse  85  99


 


Resp  22  23


 


B/P (MAP)  119/73 (88)  139/84 (102)


 


Pulse Ox  100 100 100


 


O2 Delivery Mechanical Ventilator Ventilator Ventilator Ventilator





 4/14/20 4/14/20 4/14/20 4/14/20





 22:00 23:00 23:20 23:59


 


Pulse 103 98  


 


Resp 22 28  


 


B/P (MAP) 142/78 (99) 165/88 (113)  


 


Pulse Ox 99 99 100 


 


O2 Delivery Ventilator Ventilator Ventilator Mechanical Ventilator





 4/14/20 4/15/20 4/15/20 4/15/20





 23:59 01:00 01:11 01:42


 


Temp 99.5   





 99.5   


 


Pulse 98 90  


 


Resp 28 22  


 


B/P (MAP) 141/81 (101) 137/79 (98)  


 


Pulse Ox 99 100 99 99


 


O2 Delivery Ventilator Ventilator  


 


O2 Flow Rate   6.0 6.0


 


    





    





 4/15/20 4/15/20 4/15/20 4/15/20





 02:00 02:10 03:00 03:59


 


Temp    99.0





    99.0


 


Pulse 85  82 82


 


Resp 22 28 21


 


B/P (MAP) 116/65 (82)  112/67 (82) 114/60 (78)


 


Pulse Ox 100 100 100 100


 


O2 Delivery Ventilator Ventilator Ventilator Ventilator


 


    





    





 4/15/20 4/15/20 4/15/20 4/15/20





 04:00 04:05 05:00 06:00


 


Pulse   80 82


 


Resp   20 20


 


B/P (MAP)   114/60 (78) 116/66 (83)


 


Pulse Ox  100 100 100


 


O2 Delivery Mechanical Ventilator Ventilator Ventilator Ventilator





 4/15/20 4/15/20 4/15/20 





 07:34 08:00 08:13 


 


Resp   16 


 


Pulse Ox 99  99 


 


O2 Delivery Ventilator Mechanical Ventilator Ventilator 














Intake and Output   


 


 4/14/20 4/14/20 4/15/20





 15:00 23:00 07:00


 


Intake Total 350 ml 1395.28 ml 1670 ml


 


Output Total 95 ml 120 ml 120 ml


 


Balance 255 ml 1275.28 ml 1550 ml











Hemodynamically unstable?:  No


Is patient in severe pain?:  No


Is NPO status required?:  Yes











GAETANO GONCALVES MD        Apr 15, 2020 08:35

## 2020-04-16 VITALS — DIASTOLIC BLOOD PRESSURE: 49 MMHG | SYSTOLIC BLOOD PRESSURE: 91 MMHG

## 2020-04-16 VITALS — SYSTOLIC BLOOD PRESSURE: 143 MMHG | DIASTOLIC BLOOD PRESSURE: 76 MMHG

## 2020-04-16 VITALS — DIASTOLIC BLOOD PRESSURE: 91 MMHG | SYSTOLIC BLOOD PRESSURE: 165 MMHG

## 2020-04-16 VITALS — DIASTOLIC BLOOD PRESSURE: 89 MMHG | SYSTOLIC BLOOD PRESSURE: 160 MMHG

## 2020-04-16 VITALS — DIASTOLIC BLOOD PRESSURE: 70 MMHG | SYSTOLIC BLOOD PRESSURE: 112 MMHG

## 2020-04-16 VITALS — SYSTOLIC BLOOD PRESSURE: 100 MMHG | DIASTOLIC BLOOD PRESSURE: 54 MMHG

## 2020-04-16 VITALS — DIASTOLIC BLOOD PRESSURE: 58 MMHG | SYSTOLIC BLOOD PRESSURE: 101 MMHG

## 2020-04-16 VITALS — DIASTOLIC BLOOD PRESSURE: 64 MMHG | SYSTOLIC BLOOD PRESSURE: 114 MMHG

## 2020-04-16 VITALS — DIASTOLIC BLOOD PRESSURE: 85 MMHG | SYSTOLIC BLOOD PRESSURE: 140 MMHG

## 2020-04-16 VITALS — SYSTOLIC BLOOD PRESSURE: 112 MMHG | DIASTOLIC BLOOD PRESSURE: 75 MMHG

## 2020-04-16 VITALS — DIASTOLIC BLOOD PRESSURE: 93 MMHG | SYSTOLIC BLOOD PRESSURE: 159 MMHG

## 2020-04-16 VITALS — DIASTOLIC BLOOD PRESSURE: 49 MMHG | SYSTOLIC BLOOD PRESSURE: 77 MMHG

## 2020-04-16 VITALS — DIASTOLIC BLOOD PRESSURE: 91 MMHG | SYSTOLIC BLOOD PRESSURE: 168 MMHG

## 2020-04-16 VITALS — SYSTOLIC BLOOD PRESSURE: 141 MMHG | DIASTOLIC BLOOD PRESSURE: 81 MMHG

## 2020-04-16 VITALS — SYSTOLIC BLOOD PRESSURE: 116 MMHG | DIASTOLIC BLOOD PRESSURE: 69 MMHG

## 2020-04-16 VITALS — SYSTOLIC BLOOD PRESSURE: 113 MMHG | DIASTOLIC BLOOD PRESSURE: 66 MMHG

## 2020-04-16 VITALS — DIASTOLIC BLOOD PRESSURE: 90 MMHG | SYSTOLIC BLOOD PRESSURE: 163 MMHG

## 2020-04-16 VITALS — SYSTOLIC BLOOD PRESSURE: 154 MMHG | DIASTOLIC BLOOD PRESSURE: 81 MMHG

## 2020-04-16 VITALS — SYSTOLIC BLOOD PRESSURE: 115 MMHG | DIASTOLIC BLOOD PRESSURE: 68 MMHG

## 2020-04-16 LAB
ANION GAP SERPL CALC-SCNC: 12 MMOL/L (ref 6–14)
BASE EXCESS ABG: -1 MMOL/L (ref -3–3)
BASOPHILS # BLD AUTO: 0 X10^3/UL (ref 0–0.2)
BASOPHILS NFR BLD: 1 % (ref 0–3)
BUN SERPL-MCNC: 88 MG/DL (ref 7–20)
CALCIUM SERPL-MCNC: 9.1 MG/DL (ref 8.5–10.1)
CHLORIDE SERPL-SCNC: 102 MMOL/L (ref 98–107)
CO2 SERPL-SCNC: 25 MMOL/L (ref 21–32)
CREAT SERPL-MCNC: 1.8 MG/DL (ref 0.6–1)
EOSINOPHIL NFR BLD: 0 % (ref 0–3)
EOSINOPHIL NFR BLD: 0 X10^3/UL (ref 0–0.7)
ERYTHROCYTE [DISTWIDTH] IN BLOOD BY AUTOMATED COUNT: 18.9 % (ref 11.5–14.5)
GFR SERPLBLD BASED ON 1.73 SQ M-ARVRAT: 29.9 ML/MIN
GLUCOSE SERPL-MCNC: 120 MG/DL (ref 70–99)
HCO3 BLDA-SCNC: 22 MMOL/L (ref 21–28)
HCT VFR BLD CALC: 26.5 % (ref 36–47)
HGB BLD-MCNC: 8.9 G/DL (ref 12–15.5)
INSPIRATION SETTING TIME VENT: (no result)
LYMPHOCYTES # BLD: 2 X10^3/UL (ref 1–4.8)
LYMPHOCYTES NFR BLD AUTO: 21 % (ref 24–48)
MAGNESIUM SERPL-MCNC: 2 MG/DL (ref 1.8–2.4)
MCH RBC QN AUTO: 32 PG (ref 25–35)
MCHC RBC AUTO-ENTMCNC: 33 G/DL (ref 31–37)
MCV RBC AUTO: 96 FL (ref 79–100)
MONO #: 1.1 X10^3/UL (ref 0–1.1)
MONOCYTES NFR BLD: 12 % (ref 0–9)
NEUT #: 6.1 X10^3/UL (ref 1.8–7.7)
NEUTROPHILS NFR BLD AUTO: 66 % (ref 31–73)
PCO2 BLDA: 33 MMHG (ref 35–46)
PCO2 TEMP ADJ BLD: 35 MMHG
PH TEMP ADJ BLD: 7.43 [PH]
PHOSPHATE SERPL-MCNC: 5.1 MG/DL (ref 2.6–4.7)
PLATELET # BLD AUTO: 530 X10^3/UL (ref 140–400)
PO2 BLDA: 103 MMHG (ref 75–108)
PO2 TEMP ADJ BLD: 114 MMHG
POTASSIUM SERPL-SCNC: 3.9 MMOL/L (ref 3.5–5.1)
RBC # BLD AUTO: 2.76 X10^6/UL (ref 3.5–5.4)
SAO2 % BLDA: 97 % (ref 92–99)
SODIUM SERPL-SCNC: 139 MMOL/L (ref 136–145)
WBC # BLD AUTO: 9.2 X10^3/UL (ref 4–11)

## 2020-04-16 RX ADMIN — DEXMEDETOMIDINE HYDROCHLORIDE PRN MLS/HR: 100 INJECTION, SOLUTION, CONCENTRATE INTRAVENOUS at 13:07

## 2020-04-16 RX ADMIN — ACETAMINOPHEN PRN MG: 650 SOLUTION ORAL at 19:56

## 2020-04-16 RX ADMIN — PANTOPRAZOLE SODIUM SCH MG: 40 INJECTION, POWDER, FOR SOLUTION INTRAVENOUS at 08:54

## 2020-04-16 RX ADMIN — INSULIN LISPRO SCH UNITS: 100 INJECTION, SOLUTION INTRAVENOUS; SUBCUTANEOUS at 06:00

## 2020-04-16 RX ADMIN — INSULIN LISPRO SCH UNITS: 100 INJECTION, SOLUTION INTRAVENOUS; SUBCUTANEOUS at 18:00

## 2020-04-16 RX ADMIN — Medication PRN EACH: at 13:19

## 2020-04-16 RX ADMIN — DEXMEDETOMIDINE HYDROCHLORIDE PRN MLS/HR: 100 INJECTION, SOLUTION, CONCENTRATE INTRAVENOUS at 04:31

## 2020-04-16 RX ADMIN — DEXMEDETOMIDINE HYDROCHLORIDE PRN MLS/HR: 100 INJECTION, SOLUTION, CONCENTRATE INTRAVENOUS at 17:09

## 2020-04-16 RX ADMIN — DEXMEDETOMIDINE HYDROCHLORIDE PRN MLS/HR: 100 INJECTION, SOLUTION, CONCENTRATE INTRAVENOUS at 08:50

## 2020-04-16 RX ADMIN — DEXMEDETOMIDINE HYDROCHLORIDE PRN MLS/HR: 100 INJECTION, SOLUTION, CONCENTRATE INTRAVENOUS at 19:03

## 2020-04-16 RX ADMIN — MEROPENEM SCH MLS/HR: 500 INJECTION, POWDER, FOR SOLUTION INTRAVENOUS at 23:04

## 2020-04-16 RX ADMIN — INSULIN LISPRO SCH UNITS: 100 INJECTION, SOLUTION INTRAVENOUS; SUBCUTANEOUS at 00:00

## 2020-04-16 RX ADMIN — HEPARIN SODIUM SCH UNIT: 5000 INJECTION, SOLUTION INTRAVENOUS; SUBCUTANEOUS at 09:00

## 2020-04-16 RX ADMIN — DEXMEDETOMIDINE HYDROCHLORIDE PRN MLS/HR: 100 INJECTION, SOLUTION, CONCENTRATE INTRAVENOUS at 22:08

## 2020-04-16 RX ADMIN — INSULIN LISPRO SCH UNITS: 100 INJECTION, SOLUTION INTRAVENOUS; SUBCUTANEOUS at 12:00

## 2020-04-16 RX ADMIN — MEROPENEM SCH MLS/HR: 500 INJECTION, POWDER, FOR SOLUTION INTRAVENOUS at 15:41

## 2020-04-16 RX ADMIN — HEPARIN SODIUM SCH UNIT: 5000 INJECTION, SOLUTION INTRAVENOUS; SUBCUTANEOUS at 21:10

## 2020-04-16 RX ADMIN — ONDANSETRON PRN MG: 2 INJECTION INTRAMUSCULAR; INTRAVENOUS at 00:00

## 2020-04-16 RX ADMIN — ONDANSETRON PRN MG: 2 INJECTION INTRAMUSCULAR; INTRAVENOUS at 19:40

## 2020-04-16 RX ADMIN — DEXTROSE SCH MLS/HR: 50 INJECTION, SOLUTION INTRAVENOUS at 15:08

## 2020-04-16 RX ADMIN — DEXMEDETOMIDINE HYDROCHLORIDE PRN MLS/HR: 100 INJECTION, SOLUTION, CONCENTRATE INTRAVENOUS at 10:38

## 2020-04-16 RX ADMIN — INSULIN LISPRO SCH UNITS: 100 INJECTION, SOLUTION INTRAVENOUS; SUBCUTANEOUS at 23:58

## 2020-04-16 RX ADMIN — DEXMEDETOMIDINE HYDROCHLORIDE PRN MLS/HR: 100 INJECTION, SOLUTION, CONCENTRATE INTRAVENOUS at 01:38

## 2020-04-16 NOTE — PDOC
Objective:


Objective:


D/w nurse.


Vital Signs:





                                   Vital Signs








  Date Time  Temp Pulse Resp B/P (MAP) Pulse Ox O2 Delivery O2 Flow Rate FiO2


 


4/16/20 07:57     99 Ventilator  


 


4/16/20 06:00  105 26 168/91 (116)    


 


4/16/20 04:00 99.8       





 99.8       








Labs:





Laboratory Tests








Test


 4/15/20


11:57 4/15/20


12:35 4/15/20


23:48 4/16/20


06:15


 


Glucose (Fingerstick) 164 mg/dL   158 mg/dL  


 


Prothrombin Time  15.6 SEC   


 


Prothromb Time International


Ratio 


 1.3 


 


 





 


Sodium Level    139 mmol/L 


 


Potassium Level    3.9 mmol/L 


 


Chloride Level    102 mmol/L 


 


Carbon Dioxide Level    25 mmol/L 


 


Anion Gap    12 


 


Blood Urea Nitrogen    88 mg/dL 


 


Creatinine    1.8 mg/dL 


 


Estimated GFR


(Cockcroft-Gault) 


 


 


 29.9 





 


Glucose Level    120 mg/dL 


 


Calcium Level    9.1 mg/dL 


 


Phosphorus Level    5.1 mg/dL 


 


Magnesium Level    2.0 mg/dL 


 


Test


 4/16/20


06:18 4/16/20


08:00 


 





 


Glucose (Fingerstick) 106 mg/dL    


 


O2 Saturation  97 %   


 


Arterial Blood pH  7.45   


 


Arterial Blood pH (Temp


corrected) 


 7.43 


 


 





 


Arterial Blood pCO2 at


Patient Temp 


 33 mmHg 


 


 





 


Arterial Blood pCO2 (Temp


correct) 


 35 mmHg 


 


 





 


Arterial Blood pO2 at Patient


Temp 


 103 mmHg 


 


 





 


Arterial Blood pO2 (Temp


corrected) 


 114 mmHg 


 


 





 


Arterial Blood HCO3  22 mmol/L   


 


Arterial Blood Base Excess  -1 mmol/L   


 


FiO2  35% vent   








Imaging:


Paracentesis 4/15


4300 cc of thin, light brown fluid was removed





PE:





GEN: NAD


LUNGS: trach, RTs present


HEART: tachycardic


ABD: less distended


NEURO/PSYCH: anxious, touching trach





A/P:


Gallstone pancreatitis, MOSF





--


Continue support per GI.





Hemodynamically unstable?:  No


Is patient in severe pain?:  No


Is NPO status required?:  No











RAGHAVENDRA MURCIA         Apr 16, 2020 09:29

## 2020-04-16 NOTE — NUR
1247 Remains in dialysis. Line issues being managed at this time. Necessary meds ordered and 
sent to 116 (sedation/antianxiety meds ) for use as needed.

## 2020-04-16 NOTE — PDOC
SURGICAL PROGRESS NOTE


Subjective


seen in dialysis


trying to communicate--frustrated with difficulty


Vital Signs





Vital Signs








  Date Time  Temp Pulse Resp B/P (MAP) Pulse Ox O2 Delivery O2 Flow Rate FiO2


 


4/16/20 07:57     99 Ventilator  


 


4/16/20 06:00  105 26 168/91 (116)    


 


4/16/20 04:00 99.8       





 99.8       








I&O











Intake and Output 


 


 4/16/20





 07:00


 


Intake Total 2972.54 ml


 


Output Total 5620 ml


 


Balance -2647.46 ml


 


 


 


Intake Oral 0 ml


 


IV Total 2972.54 ml


 


Output Urine Total 920 ml


 


Stool Total 0 ml


 


Gastric Drainage Total 400 ml


 


Drainage Total 4300 ml








General:  Alert, Cooperative


HEENT:  Other (trach intact)


Abdomen:  Soft, Other (distended )


Labs





Laboratory Tests








Test


 4/14/20


14:37 4/14/20


17:27 4/14/20


23:34 4/15/20


05:41


 


Glucose (Fingerstick)


 153 mg/dL


(70-99) 205 mg/dL


(70-99) 167 mg/dL


(70-99) 130 mg/dL


(70-99)


 


Test


 4/15/20


05:45 4/15/20


11:57 4/15/20


12:35 4/15/20


23:48


 


White Blood Count


 5.8 x10^3/uL


(4.0-11.0) 


 


 





 


Red Blood Count


 2.61 x10^6/uL


(3.50-5.40) 


 


 





 


Hemoglobin


 8.2 g/dL


(12.0-15.5) 


 


 





 


Hematocrit


 25.2 %


(36.0-47.0) 


 


 





 


Mean Corpuscular Volume 97 fL ()    


 


Mean Corpuscular Hemoglobin 32 pg (25-35)    


 


Mean Corpuscular Hemoglobin


Concent 33 g/dL


(31-37) 


 


 





 


Red Cell Distribution Width


 18.6 %


(11.5-14.5) 


 


 





 


Platelet Count


 433 x10^3/uL


(140-400) 


 


 





 


Neutrophils (%) (Auto) 70 % (31-73)    


 


Lymphocytes (%) (Auto) 16 % (24-48)    


 


Monocytes (%) (Auto) 13 % (0-9)    


 


Eosinophils (%) (Auto) 1 % (0-3)    


 


Basophils (%) (Auto) 0 % (0-3)    


 


Neutrophils # (Auto)


 4.0 x10^3/uL


(1.8-7.7) 


 


 





 


Lymphocytes # (Auto)


 0.9 x10^3/uL


(1.0-4.8) 


 


 





 


Monocytes # (Auto)


 0.8 x10^3/uL


(0.0-1.1) 


 


 





 


Eosinophils # (Auto)


 0.0 x10^3/uL


(0.0-0.7) 


 


 





 


Basophils # (Auto)


 0.0 x10^3/uL


(0.0-0.2) 


 


 





 


Sodium Level


 138 mmol/L


(136-145) 


 


 





 


Potassium Level


 3.9 mmol/L


(3.5-5.1) 


 


 





 


Chloride Level


 101 mmol/L


() 


 


 





 


Carbon Dioxide Level


 29 mmol/L


(21-32) 


 


 





 


Anion Gap 8 (6-14)    


 


Blood Urea Nitrogen


 59 mg/dL


(7-20) 


 


 





 


Creatinine


 1.5 mg/dL


(0.6-1.0) 


 


 





 


Estimated GFR


(Cockcroft-Gault) 36.9 


 


 


 





 


BUN/Creatinine Ratio 39 (6-20)    


 


Glucose Level


 139 mg/dL


(70-99) 


 


 





 


Calcium Level


 8.9 mg/dL


(8.5-10.1) 


 


 





 


Phosphorus Level


 3.9 mg/dL


(2.6-4.7) 


 


 





 


Magnesium Level


 2.0 mg/dL


(1.8-2.4) 


 


 





 


Total Bilirubin


 0.6 mg/dL


(0.2-1.0) 


 


 





 


Aspartate Amino Transf


(AST/SGOT) 31 U/L (15-37) 


 


 


 





 


Alanine Aminotransferase


(ALT/SGPT) 16 U/L (14-59) 


 


 


 





 


Alkaline Phosphatase


 72 U/L


() 


 


 





 


Total Protein


 6.1 g/dL


(6.4-8.2) 


 


 





 


Albumin


 3.1 g/dL


(3.4-5.0) 


 


 





 


Albumin/Globulin Ratio 1.0 (1.0-1.7)    


 


Hepatitis B Surface Antigen


 Nonreactive


(Nonreactive) 


 


 





 


Glucose (Fingerstick)


 


 164 mg/dL


(70-99) 


 158 mg/dL


(70-99)


 


Prothrombin Time


 


 


 15.6 SEC


(11.7-14.0) 





 


Prothromb Time International


Ratio 


 


 1.3 (0.8-1.1) 


 





 


Test


 4/16/20


06:15 4/16/20


06:18 4/16/20


08:00 4/16/20


10:00


 


Sodium Level


 139 mmol/L


(136-145) 


 


 





 


Potassium Level


 3.9 mmol/L


(3.5-5.1) 


 


 





 


Chloride Level


 102 mmol/L


() 


 


 





 


Carbon Dioxide Level


 25 mmol/L


(21-32) 


 


 





 


Anion Gap 12 (6-14)    


 


Blood Urea Nitrogen


 88 mg/dL


(7-20) 


 


 





 


Creatinine


 1.8 mg/dL


(0.6-1.0) 


 


 





 


Estimated GFR


(Cockcroft-Gault) 29.9 


 


 


 





 


Glucose Level


 120 mg/dL


(70-99) 


 


 





 


Calcium Level


 9.1 mg/dL


(8.5-10.1) 


 


 





 


Phosphorus Level


 5.1 mg/dL


(2.6-4.7) 


 


 





 


Magnesium Level


 2.0 mg/dL


(1.8-2.4) 


 


 





 


Glucose (Fingerstick)


 


 106 mg/dL


(70-99) 


 





 


O2 Saturation   97 % (92-99)  


 


Arterial Blood pH


 


 


 7.45


(7.35-7.45) 





 


Arterial Blood pH (Temp


corrected) 


 


 7.43 


 





 


Arterial Blood pCO2 at


Patient Temp 


 


 33 mmHg


(35-46) 





 


Arterial Blood pCO2 (Temp


correct) 


 


 35 mmHg 


 





 


Arterial Blood pO2 at Patient


Temp 


 


 103 mmHg


() 





 


Arterial Blood pO2 (Temp


corrected) 


 


 114 mmHg 


 





 


Arterial Blood HCO3


 


 


 22 mmol/L


(21-28) 





 


Arterial Blood Base Excess


 


 


 -1 mmol/L


(-3-3) 





 


FiO2   35% vent  


 


White Blood Count


 


 


 


 9.2 x10^3/uL


(4.0-11.0)


 


Red Blood Count


 


 


 


 2.76 x10^6/uL


(3.50-5.40)


 


Hemoglobin


 


 


 


 8.9 g/dL


(12.0-15.5)


 


Hematocrit


 


 


 


 26.5 %


(36.0-47.0)


 


Mean Corpuscular Volume    96 fL () 


 


Mean Corpuscular Hemoglobin    32 pg (25-35) 


 


Mean Corpuscular Hemoglobin


Concent 


 


 


 33 g/dL


(31-37)


 


Red Cell Distribution Width


 


 


 


 18.9 %


(11.5-14.5)


 


Platelet Count


 


 


 


 530 x10^3/uL


(140-400)


 


Neutrophils (%) (Auto)    66 % (31-73) 


 


Lymphocytes (%) (Auto)    21 % (24-48) 


 


Monocytes (%) (Auto)    12 % (0-9) 


 


Eosinophils (%) (Auto)    0 % (0-3) 


 


Basophils (%) (Auto)    1 % (0-3) 


 


Neutrophils # (Auto)


 


 


 


 6.1 x10^3/uL


(1.8-7.7)


 


Lymphocytes # (Auto)


 


 


 


 2.0 x10^3/uL


(1.0-4.8)


 


Monocytes # (Auto)


 


 


 


 1.1 x10^3/uL


(0.0-1.1)


 


Eosinophils # (Auto)


 


 


 


 0.0 x10^3/uL


(0.0-0.7)


 


Basophils # (Auto)


 


 


 


 0.0 x10^3/uL


(0.0-0.2)








Laboratory Tests








Test


 4/15/20


11:57 4/15/20


12:35 4/15/20


23:48 4/16/20


06:15


 


Glucose (Fingerstick)


 164 mg/dL


(70-99) 


 158 mg/dL


(70-99) 





 


Prothrombin Time


 


 15.6 SEC


(11.7-14.0) 


 





 


Prothromb Time International


Ratio 


 1.3 (0.8-1.1) 


 


 





 


Sodium Level


 


 


 


 139 mmol/L


(136-145)


 


Potassium Level


 


 


 


 3.9 mmol/L


(3.5-5.1)


 


Chloride Level


 


 


 


 102 mmol/L


()


 


Carbon Dioxide Level


 


 


 


 25 mmol/L


(21-32)


 


Anion Gap    12 (6-14) 


 


Blood Urea Nitrogen


 


 


 


 88 mg/dL


(7-20)


 


Creatinine


 


 


 


 1.8 mg/dL


(0.6-1.0)


 


Estimated GFR


(Cockcroft-Gault) 


 


 


 29.9 





 


Glucose Level


 


 


 


 120 mg/dL


(70-99)


 


Calcium Level


 


 


 


 9.1 mg/dL


(8.5-10.1)


 


Phosphorus Level


 


 


 


 5.1 mg/dL


(2.6-4.7)


 


Magnesium Level


 


 


 


 2.0 mg/dL


(1.8-2.4)


 


Test


 4/16/20


06:18 4/16/20


08:00 4/16/20


10:00 





 


Glucose (Fingerstick)


 106 mg/dL


(70-99) 


 


 





 


O2 Saturation  97 % (92-99)   


 


Arterial Blood pH


 


 7.45


(7.35-7.45) 


 





 


Arterial Blood pH (Temp


corrected) 


 7.43 


 


 





 


Arterial Blood pCO2 at


Patient Temp 


 33 mmHg


(35-46) 


 





 


Arterial Blood pCO2 (Temp


correct) 


 35 mmHg 


 


 





 


Arterial Blood pO2 at Patient


Temp 


 103 mmHg


() 


 





 


Arterial Blood pO2 (Temp


corrected) 


 114 mmHg 


 


 





 


Arterial Blood HCO3


 


 22 mmol/L


(21-28) 


 





 


Arterial Blood Base Excess


 


 -1 mmol/L


(-3-3) 


 





 


FiO2  35% vent   


 


White Blood Count


 


 


 9.2 x10^3/uL


(4.0-11.0) 





 


Red Blood Count


 


 


 2.76 x10^6/uL


(3.50-5.40) 





 


Hemoglobin


 


 


 8.9 g/dL


(12.0-15.5) 





 


Hematocrit


 


 


 26.5 %


(36.0-47.0) 





 


Mean Corpuscular Volume   96 fL ()  


 


Mean Corpuscular Hemoglobin   32 pg (25-35)  


 


Mean Corpuscular Hemoglobin


Concent 


 


 33 g/dL


(31-37) 





 


Red Cell Distribution Width


 


 


 18.9 %


(11.5-14.5) 





 


Platelet Count


 


 


 530 x10^3/uL


(140-400) 





 


Neutrophils (%) (Auto)   66 % (31-73)  


 


Lymphocytes (%) (Auto)   21 % (24-48)  


 


Monocytes (%) (Auto)   12 % (0-9)  


 


Eosinophils (%) (Auto)   0 % (0-3)  


 


Basophils (%) (Auto)   1 % (0-3)  


 


Neutrophils # (Auto)


 


 


 6.1 x10^3/uL


(1.8-7.7) 





 


Lymphocytes # (Auto)


 


 


 2.0 x10^3/uL


(1.0-4.8) 





 


Monocytes # (Auto)


 


 


 1.1 x10^3/uL


(0.0-1.1) 





 


Eosinophils # (Auto)


 


 


 0.0 x10^3/uL


(0.0-0.7) 





 


Basophils # (Auto)


 


 


 0.0 x10^3/uL


(0.0-0.2) 











Problem List


Problems


Medical Problems:


(1) Acute pancreatitis


Status: Acute  





(2) Cholelithiasis


Status: Acute  








Assessment/Plan


supportive care











SAURAV NASH APRN            Apr 16, 2020 10:54

## 2020-04-16 NOTE — PDOC
PROGRESS NOTES


Chief Complaint


Chief Complaint


A/P:


Respiratory failure requiring mechanical ventilation (on vent since 3/23)


bilateral pleural effusions/pulm edema 


Sepsis


Severe Acute gallstone pancreatitis (not a surgical candidate at this time) with

necrosis


Acute kidney failure now requiring dialysis


Salpingitis


Gallstones (Calculus of gallbladder with acute cholecystitis without 

obstruction)


HTN 


Leukocytosis 


Hypoxia


Uterine fibroid


Intractable pain


Intractable nausea


Covid 19 negative. 


Acute on chronic anemia 


EEG: No seizure activity.


ESRD on HD


Hyperglycemia, persistently in 200s





History of Present Illness


History of Present Illness


Ms Tadeo is a 48yo F w/ PMHx HTN, prediabetes who presented to the emergency 

room with complaints of abdominal pain on 3/16/2020. Found with Lipase [id number], 

, , Bilirubin 1.4.


CT abdomen confirms pancreatic inflammation, peripancreatic fluid and 

inflammatory changes around the pancreas consistent with pancreatitis. 

Cholelithiasis and 1.4cm uterine fibroid as well as possible left salpingitis. 

Admitted for further care


GI, General surgery, ID, Pulm consulted.





3/17: No urine output. Added dilaudid for pain, PICC placed per IR. Renal US 

negative.Seen bedside in ICU, given 2L additional NSS and albumin infusion. 

Still hypotensive, started on levophed. Repeat CT abdomen with necrosis. 


3/18: O2 saturation 87% on nasal cannula oxygen. Dialysis catheter per 

nephrology


3/19: She is now on BiPAP appears more ill, now on dialysis


3/20: Seen on BiPAP. Her mother and another family member are present and seemed

to be good support for her. Currently on dialysis. Appears critically ill


3/21: Overnight Tmax 101.7 , still on BiPAP FiO2 40%, still on low dose Levophed

gtt, TPN initiated. On dialysis


4/6: Tracheostomy


4/12: S/p tracheostomy on vent spontaneous respirations with 5 of pressure 

support 35% FiO2, rectal tube and a [place], off pressors


4/13: Off pressors. Seen on dialysis this morning. BUN 80. Tracking with her 

eyes. Still on vent via trach.


4/14: BUN 68, Cr 1.7. Temp 100.2F axillary. WBC 9.8. Still on vent via trach. 

Tracking reasonably well. In obvious discomfort. Removed PICC and CVC LIJ and 

replaced. Tips sent for culture. CT chest/abd/pelvis with bilateral pleural 

effusion and ascites.


4/15: Renal function stable. Still on vent. More interactive today. Miming wish 

for food. Plan discussed for thoracentesis/paracentesis with daughters today. 

They were under impression patient was doing worse due to a miscommunication 

which has been clarified over the phone. 4.3L removed.





BUN 88, Cr 1.8. Much more interactive today. Appears more comfortable today.





Plan:


Cont vent weaning, dialysis





Vitals


Vitals





Vital Signs








  Date Time  Temp Pulse Resp B/P (MAP) Pulse Ox O2 Delivery O2 Flow Rate FiO2


 


4/16/20 06:00  105 26 168/91 (116) 100 Ventilator  


 


4/16/20 04:00 99.8       





 99.8       











Physical Exam


Physical Exam


GENERAL: Alert and coop. mouthing words  Appears comfortable has generalized 

anasarca 


HEENT: Pupils equal, + NGT, oral cavity dry 


NECK:  Trach/vent 


LUNGS: Diminished aeration 


HEART:  S1, S2, regular 


ABDOMEN:  Distended, hypoactive BS, Rectal tube- out


: [place]  changed 4/14


EXTREMITIES: Generalized edema, no cyanosis, SCDs bilaterally 


DERMATOLOGIC:  Warm and dry.  No generalized rash.  


CENTRAL NERVOUS SYSTEM: Responsive and seems appropriate


HDC, LIJ (4/14) and


RUE-PICC removed 4/14


General:  Alert, Cooperative, No acute distress


Heart:  Other (increased rate)


Lungs:  Other (dimished in BLL)


Abdomen:  Soft, No tenderness


Extremities:  No edema, Other (SOME CLUBBING )


Skin:  Other (mottling noted to extremities )





Labs


LABS





Laboratory Tests








Test


 4/15/20


11:57 4/15/20


12:35 4/15/20


23:48 4/16/20


06:15


 


Glucose (Fingerstick)


 164 mg/dL


(70-99) 


 158 mg/dL


(70-99) 





 


Prothrombin Time


 


 15.6 SEC


(11.7-14.0) 


 





 


Prothromb Time International


Ratio 


 1.3 (0.8-1.1) 


 


 





 


Sodium Level


 


 


 


 139 mmol/L


(136-145)


 


Potassium Level


 


 


 


 3.9 mmol/L


(3.5-5.1)


 


Chloride Level


 


 


 


 102 mmol/L


()


 


Carbon Dioxide Level


 


 


 


 25 mmol/L


(21-32)


 


Anion Gap    12 (6-14) 


 


Blood Urea Nitrogen


 


 


 


 88 mg/dL


(7-20)


 


Creatinine


 


 


 


 1.8 mg/dL


(0.6-1.0)


 


Estimated GFR


(Cockcroft-Gault) 


 


 


 29.9 





 


Glucose Level


 


 


 


 120 mg/dL


(70-99)


 


Calcium Level


 


 


 


 9.1 mg/dL


(8.5-10.1)


 


Phosphorus Level


 


 


 


 5.1 mg/dL


(2.6-4.7)


 


Magnesium Level


 


 


 


 2.0 mg/dL


(1.8-2.4)


 


Test


 4/16/20


06:18 


 


 





 


Glucose (Fingerstick)


 106 mg/dL


(70-99) 


 


 














Assessment and Plan


Assessmemt and Plan


Problems


Medical Problems:


(1) Acute pancreatitis


Status: Acute  





(2) Cholelithiasis


Status: Acute  











Comment


Review of Relevant


I have reviewed the following items josy (where applicable) has been applied.


Labs





Laboratory Tests








Test


 4/14/20


09:00 4/14/20


14:37 4/14/20


17:27 4/14/20


23:34


 


White Blood Count


 9.8 x10^3/uL


(4.0-11.0) 


 


 





 


Red Blood Count


 2.87 x10^6/uL


(3.50-5.40) 


 


 





 


Hemoglobin


 9.2 g/dL


(12.0-15.5) 


 


 





 


Hematocrit


 27.9 %


(36.0-47.0) 


 


 





 


Mean Corpuscular Volume 97 fL ()    


 


Mean Corpuscular Hemoglobin 32 pg (25-35)    


 


Mean Corpuscular Hemoglobin


Concent 33 g/dL


(31-37) 


 


 





 


Red Cell Distribution Width


 19.2 %


(11.5-14.5) 


 


 





 


Platelet Count


 380 x10^3/uL


(140-400) 


 


 





 


Sodium Level


 137 mmol/L


(136-145) 


 


 





 


Potassium Level


 3.7 mmol/L


(3.5-5.1) 


 


 





 


Chloride Level


 98 mmol/L


() 


 


 





 


Carbon Dioxide Level


 29 mmol/L


(21-32) 


 


 





 


Anion Gap 10 (6-14)    


 


Blood Urea Nitrogen


 64 mg/dL


(7-20) 


 


 





 


Creatinine


 1.7 mg/dL


(0.6-1.0) 


 


 





 


Estimated GFR


(Cockcroft-Gault) 31.9 


 


 


 





 


Glucose Level


 166 mg/dL


(70-99) 


 


 





 


Calcium Level


 9.0 mg/dL


(8.5-10.1) 


 


 





 


Glucose (Fingerstick)


 


 153 mg/dL


(70-99) 205 mg/dL


(70-99) 167 mg/dL


(70-99)


 


Test


 4/15/20


05:41 4/15/20


05:45 4/15/20


11:57 4/15/20


12:35


 


Glucose (Fingerstick)


 130 mg/dL


(70-99) 


 164 mg/dL


(70-99) 





 


White Blood Count


 


 5.8 x10^3/uL


(4.0-11.0) 


 





 


Red Blood Count


 


 2.61 x10^6/uL


(3.50-5.40) 


 





 


Hemoglobin


 


 8.2 g/dL


(12.0-15.5) 


 





 


Hematocrit


 


 25.2 %


(36.0-47.0) 


 





 


Mean Corpuscular Volume  97 fL ()   


 


Mean Corpuscular Hemoglobin  32 pg (25-35)   


 


Mean Corpuscular Hemoglobin


Concent 


 33 g/dL


(31-37) 


 





 


Red Cell Distribution Width


 


 18.6 %


(11.5-14.5) 


 





 


Platelet Count


 


 433 x10^3/uL


(140-400) 


 





 


Neutrophils (%) (Auto)  70 % (31-73)   


 


Lymphocytes (%) (Auto)  16 % (24-48)   


 


Monocytes (%) (Auto)  13 % (0-9)   


 


Eosinophils (%) (Auto)  1 % (0-3)   


 


Basophils (%) (Auto)  0 % (0-3)   


 


Neutrophils # (Auto)


 


 4.0 x10^3/uL


(1.8-7.7) 


 





 


Lymphocytes # (Auto)


 


 0.9 x10^3/uL


(1.0-4.8) 


 





 


Monocytes # (Auto)


 


 0.8 x10^3/uL


(0.0-1.1) 


 





 


Eosinophils # (Auto)


 


 0.0 x10^3/uL


(0.0-0.7) 


 





 


Basophils # (Auto)


 


 0.0 x10^3/uL


(0.0-0.2) 


 





 


Sodium Level


 


 138 mmol/L


(136-145) 


 





 


Potassium Level


 


 3.9 mmol/L


(3.5-5.1) 


 





 


Chloride Level


 


 101 mmol/L


() 


 





 


Carbon Dioxide Level


 


 29 mmol/L


(21-32) 


 





 


Anion Gap  8 (6-14)   


 


Blood Urea Nitrogen


 


 59 mg/dL


(7-20) 


 





 


Creatinine


 


 1.5 mg/dL


(0.6-1.0) 


 





 


Estimated GFR


(Cockcroft-Gault) 


 36.9 


 


 





 


BUN/Creatinine Ratio  39 (6-20)   


 


Glucose Level


 


 139 mg/dL


(70-99) 


 





 


Calcium Level


 


 8.9 mg/dL


(8.5-10.1) 


 





 


Phosphorus Level


 


 3.9 mg/dL


(2.6-4.7) 


 





 


Magnesium Level


 


 2.0 mg/dL


(1.8-2.4) 


 





 


Total Bilirubin


 


 0.6 mg/dL


(0.2-1.0) 


 





 


Aspartate Amino Transf


(AST/SGOT) 


 31 U/L (15-37) 


 


 





 


Alanine Aminotransferase


(ALT/SGPT) 


 16 U/L (14-59) 


 


 





 


Alkaline Phosphatase


 


 72 U/L


() 


 





 


Total Protein


 


 6.1 g/dL


(6.4-8.2) 


 





 


Albumin


 


 3.1 g/dL


(3.4-5.0) 


 





 


Albumin/Globulin Ratio  1.0 (1.0-1.7)   


 


Hepatitis B Surface Antigen


 


 Nonreactive


(Nonreactive) 


 





 


Prothrombin Time


 


 


 


 15.6 SEC


(11.7-14.0)


 


Prothromb Time International


Ratio 


 


 


 1.3 (0.8-1.1) 





 


Test


 4/15/20


23:48 4/16/20


06:15 4/16/20


06:18 





 


Glucose (Fingerstick)


 158 mg/dL


(70-99) 


 106 mg/dL


(70-99) 





 


Sodium Level


 


 139 mmol/L


(136-145) 


 





 


Potassium Level


 


 3.9 mmol/L


(3.5-5.1) 


 





 


Chloride Level


 


 102 mmol/L


() 


 





 


Carbon Dioxide Level


 


 25 mmol/L


(21-32) 


 





 


Anion Gap  12 (6-14)   


 


Blood Urea Nitrogen


 


 88 mg/dL


(7-20) 


 





 


Creatinine


 


 1.8 mg/dL


(0.6-1.0) 


 





 


Estimated GFR


(Cockcroft-Gault) 


 29.9 


 


 





 


Glucose Level


 


 120 mg/dL


(70-99) 


 





 


Calcium Level


 


 9.1 mg/dL


(8.5-10.1) 


 





 


Phosphorus Level


 


 5.1 mg/dL


(2.6-4.7) 


 





 


Magnesium Level


 


 2.0 mg/dL


(1.8-2.4) 


 











Laboratory Tests








Test


 4/15/20


11:57 4/15/20


12:35 4/15/20


23:48 4/16/20


06:15


 


Glucose (Fingerstick)


 164 mg/dL


(70-99) 


 158 mg/dL


(70-99) 





 


Prothrombin Time


 


 15.6 SEC


(11.7-14.0) 


 





 


Prothromb Time International


Ratio 


 1.3 (0.8-1.1) 


 


 





 


Sodium Level


 


 


 


 139 mmol/L


(136-145)


 


Potassium Level


 


 


 


 3.9 mmol/L


(3.5-5.1)


 


Chloride Level


 


 


 


 102 mmol/L


()


 


Carbon Dioxide Level


 


 


 


 25 mmol/L


(21-32)


 


Anion Gap    12 (6-14) 


 


Blood Urea Nitrogen


 


 


 


 88 mg/dL


(7-20)


 


Creatinine


 


 


 


 1.8 mg/dL


(0.6-1.0)


 


Estimated GFR


(Cockcroft-Gault) 


 


 


 29.9 





 


Glucose Level


 


 


 


 120 mg/dL


(70-99)


 


Calcium Level


 


 


 


 9.1 mg/dL


(8.5-10.1)


 


Phosphorus Level


 


 


 


 5.1 mg/dL


(2.6-4.7)


 


Magnesium Level


 


 


 


 2.0 mg/dL


(1.8-2.4)


 


Test


 4/16/20


06:18 


 


 





 


Glucose (Fingerstick)


 106 mg/dL


(70-99) 


 


 











Microbiology


4/14/20 Blood Culture - Preliminary, Resulted


          NO GROWTH AFTER 1 DAY


4/12/20 Urine Culture - Final, Complete


          


4/12/20 Urine Culture Result 1 ([facility]) - Final, Complete


Medications





Current Medications


Sodium Chloride 1,000 ml @  1,000 mls/hr Q1H IV  Last administered on 3/16/20at 

03:00;  Start 3/16/20 at 03:00;  Stop 3/16/20 at 03:59;  Status DC


Ondansetron HCl (Zofran) 4 mg 1X  ONCE IVP  Last administered on 3/16/20at 

03:27;  Start 3/16/20 at 03:00;  Stop 3/16/20 at 03:01;  Status DC


Morphine Sulfate (Morphine Sulfate) 4 mg 1X  ONCE IV ;  Start 3/16/20 at 03:00; 

Stop 3/16/20 at 03:01;  Status Cancel


Ketorolac Tromethamine (Toradol 30mg Vial) 30 mg 1X  ONCE IV  Last administered 

on 3/16/20at 02:54;  Start 3/16/20 at 03:00;  Stop 3/16/20 at 03:01;  Status DC


Fentanyl Citrate (Fentanyl 2ml Vial) 25 mcg 1X  ONCE IVP  Last administered on 

3/16/20at 03:23;  Start 3/16/20 at 03:30;  Stop 3/16/20 at 03:31;  Status DC


Fentanyl Citrate (Fentanyl 2ml Vial) 100 mcg STK-MED ONCE .ROUTE ;  Start 

3/16/20 at 03:18;  Stop 3/16/20 at 03:18;  Status DC


Iohexol (Omnipaque 350 Mg/ml) 90 ml 1X  ONCE IV  Last administered on 3/16/20at 

03:25;  Start 3/16/20 at 03:30;  Stop 3/16/20 at 03:31;  Status DC


Info (CONTRAST GIVEN -- Rx MONITORING) 1 each PRN DAILY  PRN MC SEE COMMENTS;  

Start 3/16/20 at 03:30;  Stop 3/18/20 at 03:29;  Status DC


Hydromorphone HCl (Dilaudid) 0.5 mg 1X  ONCE IV  Last administered on 3/16/20at 

03:55;  Start 3/16/20 at 04:30;  Stop 3/16/20 at 04:32;  Status DC


Ondansetron HCl (Zofran) 4 mg PRN Q8HRS  PRN IV NAUSEA/VOMITING 1ST CHOICE;  

Start 3/16/20 at 05:00;  Stop 3/16/20 at 09:27;  Status DC


Morphine Sulfate (Morphine Sulfate) 2 mg PRN Q2HR  PRN IV SEVERE PAIN 7-10 Last 

administered on 3/17/20at 12:26;  Start 3/16/20 at 05:00;  Stop 3/17/20 at 

14:15;  Status DC


Sodium Chloride 1,000 ml @  125 mls/hr Q8H IV  Last administered on 3/16/20at 

20:56;  Start 3/16/20 at 05:00;  Stop 3/17/20 at 04:59;  Status DC


Hydromorphone HCl (Dilaudid) 0.5 mg PRN Q3HRS  PRN IV SEVERE PAIN 7-10 Last 

administered on 3/17/20at 10:06;  Start 3/16/20 at 05:00;  Stop 3/17/20 at 

12:01;  Status DC


Piperacillin Sod/ Tazobactam Sod 4.5 gm/Sodium Chloride 100 ml @  200 mls/hr 1X 

ONCE IV  Last administered on 3/16/20at 05:44;  Start 3/16/20 at 06:00;  Stop 

3/16/20 at 06:29;  Status DC


Ondansetron HCl (Zofran) 4 mg PRN Q4HRS  PRN IV NAUSEA/VOMITING 1ST CHOICE Last 

administered on 4/16/20at 00:00;  Start 3/16/20 at 09:30


Insulin Human Lispro (HumaLOG) 0-9 UNITS Q6HRS SQ  Last administered on 

4/15/20at 23:50;  Start 3/16/20 at 09:30


Dextrose (Dextrose 50%-Water Syringe) 12.5 gm PRN Q15MIN  PRN IV SEE COMMENTS;  

Start 3/16/20 at 09:30


Pantoprazole Sodium (PROTONIX VIAL for IV PUSH) 40 mg DAILYAC IVP  Last admin

istered on 4/15/20at 07:38;  Start 3/16/20 at 11:30


Prochlorperazine Edisylate (Compazine) 10 mg PRN Q6HRS  PRN IV NAUSEA/VOMITING, 

2nd CHOICE Last administered on 3/17/20at 00:42;  Start 3/16/20 at 17:45


Atenolol (Tenormin) 100 mg DAILY PO ;  Start 3/17/20 at 09:00;  Stop 3/16/20 at 

20:08;  Status DC


Metoprolol Tartrate (Lopressor Vial) 2.5 mg Q6HRS IVP  Last administered on 

3/17/20at 05:51;  Start 3/16/20 at 20:15;  Stop 3/17/20 at 10:02;  Status DC


Metoprolol Tartrate (Lopressor Vial) 5 mg Q6HRS IVP  Last administered on 

3/26/20at 00:12;  Start 3/17/20 at 10:15;  Stop 3/28/20 at 08:48;  Status DC


Hydromorphone HCl (Dilaudid) 1 mg PRN Q3HRS  PRN IV SEVERE PAIN 7-10 Last 

administered on 3/23/20at 05:13;  Start 3/17/20 at 12:00;  Stop 3/31/20 at 

00:25;  Status DC


Lidocaine HCl (Buffered Lidocaine 1%) 3 ml STK-MED ONCE .ROUTE ;  Start 3/17/20 

at 12:55;  Stop 3/17/20 at 12:56;  Status DC


Albumin Human 500 ml @  125 mls/hr 1X  ONCE IV  Last administered on 3/17/20at 

14:33;  Start 3/17/20 at 14:30;  Stop 3/17/20 at 18:32;  Status DC


Norepinephrine Bitartrate 8 mg/ Dextrose 258 ml @  17.299 mls/ hr CONT  PRN IV 

PER PROTOCOL Last administered on 4/14/20at 12:48;  Start 3/17/20 at 15:30


Sodium Chloride 1,000 ml @  125 mls/hr Q8H IV  Last administered on 3/17/20at 

21:04;  Start 3/17/20 at 16:00;  Stop 3/18/20 at 02:42;  Status DC


Albumin Human 500 ml @  125 mls/hr PRN BID  PRN IV After every 2L NSS & BP < 

90mm Last administered on 4/1/20at 14:21;  Start 3/17/20 at 16:00


Iohexol (Omnipaque 300 Mg/ml) 60 ml 1X  ONCE IV  Last administered on 3/17/20at 

17:20;  Start 3/17/20 at 17:00;  Stop 3/17/20 at 17:01;  Status DC


Info (CONTRAST GIVEN -- Rx MONITORING) 1 each PRN DAILY  PRN MC SEE COMMENTS;  

Start 3/17/20 at 17:00;  Stop 3/19/20 at 16:59;  Status DC


Meropenem 1 gm/ Sodium Chloride 100 ml @  200 mls/hr Q8HRS IV  Last administered

on 3/18/20at 05:45;  Start 3/17/20 at 20:00;  Stop 3/18/20 at 08:48;  Status DC


Furosemide (Lasix) 40 mg 1X  ONCE IVP  Last administered on 3/17/20at 22:12;  

Start 3/17/20 at 22:30;  Stop 3/17/20 at 22:31;  Status DC


Calcium Chloride 1000 mg/Sodium Chloride 110 ml @  220 mls/hr 1X  ONCE IV  Last 

administered on 3/17/20at 22:11;  Start 3/17/20 at 22:30;  Stop 3/17/20 at 

22:59;  Status DC


Albuterol Sulfate (Ventolin Neb Soln) 2.5 mg 1X  ONCE NEB  Last administered on 

3/18/20at 00:56;  Start 3/17/20 at 22:30;  Stop 3/17/20 at 22:31;  Status DC


Insulin Human Regular (HumuLIN R VIAL) 5 unit 1X  ONCE IV  Last administered on 

3/17/20at 22:14;  Start 3/17/20 at 22:30;  Stop 3/17/20 at 22:31;  Status DC


Magnesium Sulfate 50 ml @ 25 mls/hr 1X  ONCE IV  Last administered on 3/18/20at 

02:57;  Start 3/18/20 at 03:00;  Stop 3/18/20 at 04:59;  Status DC


Calcium Gluconate 1000 mg/Sodium Chloride 110 ml @  220 mls/hr 1X  ONCE IV  Last

administered on 3/18/20at 02:46;  Start 3/18/20 at 03:00;  Stop 3/18/20 at 

03:29;  Status DC


Sodium Chloride 1,000 ml @  200 mls/hr Q5H IV  Last administered on 3/18/20at 

02:46;  Start 3/18/20 at 03:00;  Stop 3/18/20 at 10:21;  Status DC


Calcium Gluconate 1000 mg/Sodium Chloride 110 ml @  220 mls/hr 1X  ONCE IV  Last

administered on 3/18/20at 03:21;  Start 3/18/20 at 03:30;  Stop 3/18/20 at 

03:59;  Status DC


Sodium Bicarbonate 50 meq/Sodium Chloride 1,050 ml @  75 mls/hr Q14H IV  Last 

administered on 3/22/20at 21:10;  Start 3/18/20 at 07:30;  Stop 3/23/20 at 

10:28;  Status DC


Calcium Gluconate 2000 mg/Sodium Chloride 120 ml @  220 mls/hr 1X  ONCE IV  Last

administered on 3/18/20at 09:05;  Start 3/18/20 at 07:30;  Stop 3/18/20 at 

08:02;  Status DC


Lidocaine HCl (Xylocaine-Mpf 1% 2ml Vial) 2 ml STK-MED ONCE .ROUTE ;  Start 

3/18/20 at 08:47;  Stop 3/18/20 at 08:47;  Status DC


Meropenem 500 mg/ Sodium Chloride 50 ml @  100 mls/hr Q12HR IV  Last 

administered on 3/23/20at 21:01;  Start 3/18/20 at 18:00;  Stop 3/24/20 at 

07:58;  Status DC


Lidocaine HCl (Buffered Lidocaine 1%) 3 ml STK-MED ONCE .ROUTE ;  Start 3/18/20 

at 09:46;  Stop 3/18/20 at 09:46;  Status DC


Lidocaine HCl (Buffered Lidocaine 1%) 6 ml 1X  ONCE INJ  Last administered on 

3/18/20at 10:26;  Start 3/18/20 at 10:15;  Stop 3/18/20 at 10:16;  Status DC


Info (Tpn Per Pharmacy) 1 each PRN DAILY  PRN MC SEE COMMENTS Last administered 

on 4/15/20at 10:54;  Start 3/18/20 at 12:00


Sodium Chloride 1,000 ml @  1,000 mls/hr Q1H PRN IV hypotension;  Start 3/18/20 

at 12:07;  Stop 3/18/20 at 18:06;  Status DC


Diphenhydramine HCl (Benadryl) 25 mg 1X PRN  PRN IV ITCHING;  Start 3/18/20 at 

12:15;  Stop 3/19/20 at 12:14;  Status DC


Diphenhydramine HCl (Benadryl) 25 mg 1X PRN  PRN IV ITCHING;  Start 3/18/20 at 

12:15;  Stop 3/19/20 at 12:14;  Status DC


Sodium Chloride 1,000 ml @  400 mls/hr Q2H30M PRN IV PATENCY;  Start 3/18/20 at 

12:07;  Stop 3/19/20 at 00:06;  Status DC


Info (PHARMACY MONITORING -- do not chart) 1 each PRN DAILY  PRN MC SEE 

COMMENTS;  Start 3/18/20 at 12:15;  Stop 3/20/20 at 08:13;  Status DC


Sodium Chloride 90 meq/Calcium Gluconate 10 meq/ Multivitamins 10 ml/Chromium/ 

Copper/Manganese/ Seleni/Zn 1 ml/ Total Parenteral Nutrition/Amino 

Acids/Dextrose/ Fat Emulsion Intravenous 55.005 ml  @ 2.292 mls/hr TPN  CONT IV 

;  Start 3/18/20 at 22:00;  Stop 3/18/20 at 12:33;  Status DC


Info (Tpn Per Pharmacy) 1 each PRN DAILY  PRN MC SEE COMMENTS;  Start 3/18/20 at

12:30;  Status UNV


Sodium Chloride 90 meq/Calcium Gluconate 10 meq/ Multivitamins 10 ml/Chromium/ 

Copper/Manganese/ Seleni/Zn 0.5 ml/ Total Parenteral Nutrition/Amino 

Acids/Dextrose/ Fat Emulsion Intravenous 1,512 ml @  63 mls/hr TPN  CONT IV  

Last administered on 3/18/20at 22:06;  Start 3/18/20 at 22:00;  Stop 3/19/20 at 

21:59;  Status DC


Calcium Carbonate/ Glycine (Tums) 500 mg PRN AFTMEALHC  PRN PO INDIGESTION;  

Start 3/18/20 at 17:45


Calcium Gluconate (Calcium Gluconate) 2,000 mg 1X  ONCE IVP  Last administered 

on 3/19/20at 02:19;  Start 3/19/20 at 02:15;  Stop 3/19/20 at 02:16;  Status DC


Calcium Chloride 3000 mg/Sodium Chloride 1,030 ml @  50 mls/hr [id number] IV  Last 

administered on 3/21/20at 02:17;  Start 3/19/20 at 08:00;  Stop 3/21/20 at 

15:23;  Status DC


Lorazepam (Ativan Inj) 1 mg PRN Q4HRS  PRN IVP ANXIETY / AGITATION, 2nd choic 

Last administered on 4/14/20at 11:06;  Start 3/19/20 at 09:00


Sodium Chloride 1,000 ml @  1,000 mls/hr Q1H PRN IV hypotension;  Start 3/19/20 

at 08:56;  Stop 3/19/20 at 14:55;  Status DC


Albumin Human 200 ml @  200 mls/hr 1X PRN  PRN IV Hypotension;  Start 3/19/20 at

09:00;  Stop 3/19/20 at 14:59;  Status DC


Diphenhydramine HCl (Benadryl) 25 mg 1X PRN  PRN IV ITCHING;  Start 3/19/20 at 

09:00;  Stop 3/20/20 at 08:59;  Status DC


Diphenhydramine HCl (Benadryl) 25 mg 1X PRN  PRN IV ITCHING;  Start 3/19/20 at 

09:00;  Stop 3/20/20 at 08:59;  Status DC


Sodium Chloride 1,000 ml @  400 mls/hr Q2H30M PRN IV PATENCY;  Start 3/19/20 at 

08:56;  Stop 3/19/20 at 20:55;  Status DC


Info (PHARMACY MONITORING -- do not chart) 1 each PRN DAILY  PRN MC SEE 

COMMENTS;  Start 3/19/20 at 09:00;  Status UNV


Info (PHARMACY MONITORING -- do not chart) 1 each PRN DAILY  PRN MC SEE 

COMMENTS;  Start 3/19/20 at 09:00;  Stop 3/20/20 at 08:13;  Status DC


Digoxin (Lanoxin) 500 mcg 1X  ONCE IV  Last administered on 3/19/20at 10:04;  

Start 3/19/20 at 10:00;  Stop 3/19/20 at 10:01;  Status DC


Digoxin (Lanoxin) 125 mcg 1X  ONCE IV  Last administered on 3/19/20at 17:10;  

Start 3/19/20 at 18:00;  Stop 3/19/20 at 18:01;  Status DC


Magnesium Sulfate 100 ml @  25 mls/hr 1X  ONCE IV  Last administered on 

3/19/20at 12:48;  Start 3/19/20 at 13:00;  Stop 3/19/20 at 16:59;  Status DC


Sodium Chloride 90 meq/Magnesium Sulfate 10 meq/ Calcium Gluconate 20 meq/ 

Multivitamins 10 ml/Chromium/ Copper/Manganese/ Seleni/Zn 0.5 ml/ Total 

Parenteral Nutrition/Amino Acids/Dextrose/ Fat Emulsion Intravenous 1,512 ml @  

63 mls/hr TPN  CONT IV  Last administered on 3/19/20at 22:25;  Start 3/19/20 at 

22:00;  Stop 3/20/20 at 21:59;  Status DC


Sodium Chloride 1,000 ml @  1,000 mls/hr Q1H PRN IV hypotension;  Start 3/20/20 

at 08:05;  Stop 3/20/20 at 14:04;  Status DC


Albumin Human 200 ml @  200 mls/hr 1X  ONCE IV  Last administered on 3/20/20at 

08:57;  Start 3/20/20 at 08:15;  Stop 3/20/20 at 09:14;  Status DC


Diphenhydramine HCl (Benadryl) 25 mg 1X PRN  PRN IV ITCHING;  Start 3/20/20 at 

08:15;  Stop 3/21/20 at 08:14;  Status DC


Diphenhydramine HCl (Benadryl) 25 mg 1X PRN  PRN IV ITCHING;  Start 3/20/20 at 

08:15;  Stop 3/21/20 at 08:14;  Status DC


Sodium Chloride 1,000 ml @  400 mls/hr Q2H30M PRN IV PATENCY;  Start 3/20/20 at 

08:05;  Stop 3/20/20 at 20:04;  Status DC


Info (PHARMACY MONITORING -- do not chart) 1 each PRN DAILY  PRN MC SEE 

COMMENTS;  Start 3/20/20 at 08:15;  Stop 3/24/20 at 07:57;  Status DC


Sodium Chloride 90 meq/Potassium Chloride 15 meq/ Potassium Phosphate 10 mmol/ 

Magnesium Sulfate 10 meq/Calcium Gluconate 20 meq/ Multivitamins 10 ml/Chromium/

Copper/Manganese/ Seleni/Zn 0.5 ml/ Total Parenteral Nutrition/Amino 

Acids/Dextrose/ Fat Emulsion Intravenous 1,512 ml @  63 mls/hr TPN  CONT IV  

Last administered on 3/20/20at 21:01;  Start 3/20/20 at 22:00;  Stop 3/21/20 at 

21:59;  Status DC


Potassium Chloride/Water 100 ml @  100 mls/hr 1X  ONCE IV  Last administered on 

3/20/20at 14:09;  Start 3/20/20 at 14:00;  Stop 3/20/20 at 14:59;  Status DC


Benzocaine (Hurricaine One) 1 spray 1X  ONCE MM  Last administered on 3/20/20at 

16:38;  Start 3/20/20 at 14:30;  Stop 3/20/20 at 14:31;  Status DC


Lidocaine HCl (Glydo (Lidocaine) Jelly) 1 thomas 1X  ONCE MM  Last administered on 

3/20/20at 16:38;  Start 3/20/20 at 14:30;  Stop 3/20/20 at 14:31;  Status DC


Linezolid/Dextrose 300 ml @  300 mls/hr Q12HR IV  Last administered on 3/26/20at

21:04;  Start 3/20/20 at 20:00;  Stop 3/27/20 at 07:50;  Status DC


Acetaminophen (Tylenol) 650 mg PRN Q6HRS  PRN PO MILD PAIN / TEMP;  Start 

3/21/20 at 03:30;  Stop 3/21/20 at 03:36;  Status DC


Acetaminophen (Tylenol) 650 mg PRN Q6HRS  PRN PEG MILD PAIN / TEMP Last 

administered on 3/26/20at 07:35;  Start 3/21/20 at 03:36


Sodium Chloride 1,000 ml @  1,000 mls/hr Q1H PRN IV hypotension;  Start 3/21/20 

at 07:50;  Stop 3/21/20 at 13:49;  Status DC


Albumin Human 200 ml @  200 mls/hr 1X PRN  PRN IV Hypotension;  Start 3/21/20 at

08:00;  Stop 3/21/20 at 13:59;  Status DC


Sodium Chloride (Normal Saline Flush) 10 ml 1X PRN  PRN IV AP catheter pack;  

Start 3/21/20 at 08:00;  Stop 3/22/20 at 07:59;  Status DC


Sodium Chloride (Normal Saline Flush) 10 ml 1X PRN  PRN IV  catheter pack;  

Start 3/21/20 at 08:00;  Stop 3/22/20 at 07:59;  Status DC


Sodium Chloride 1,000 ml @  400 mls/hr Q2H30M PRN IV PATENCY;  Start 3/21/20 at 

07:50;  Stop 3/21/20 at 19:49;  Status DC


Info (PHARMACY MONITORING -- do not chart) 1 each PRN DAILY  PRN MC SEE 

COMMENTS;  Start 3/21/20 at 08:00;  Status UNV


Info (PHARMACY MONITORING -- do not chart) 1 each PRN DAILY  PRN MC SEE 

COMMENTS;  Start 3/21/20 at 08:00;  Stop 3/23/20 at 08:25;  Status DC


Sodium Chloride 90 meq/Potassium Chloride 15 meq/ Potassium Phosphate 10 mmol/ 

Magnesium Sulfate 10 meq/Calcium Gluconate 20 meq/ Multivitamins 10 ml/Chromium/

Copper/Manganese/ Seleni/Zn 0.5 ml/ Total Parenteral Nutrition/Amino 

Acids/Dextrose/ Fat Emulsion Intravenous 1,512 ml @  63 mls/hr TPN  CONT IV  

Last administered on 3/21/20at 20:57;  Start 3/21/20 at 22:00;  Stop 3/22/20 at 

21:59;  Status DC


Sodium Chloride 90 meq/Potassium Chloride 15 meq/ Potassium Phosphate 15 mmol/ 

Magnesium Sulfate 10 meq/Calcium Gluconate 20 meq/ Multivitamins 10 ml/Chromium/

Copper/Manganese/ Seleni/Zn 0.5 ml/ Total Parenteral Nutrition/Amino 

Acids/Dextrose/ Fat Emulsion Intravenous 1,512 ml @  63 mls/hr TPN  CONT IV ;  

Start 3/22/20 at 22:00;  Stop 3/22/20 at 14:16;  Status DC


Sodium Chloride 90 meq/Potassium Chloride 15 meq/ Potassium Phosphate 15 mmol/ 

Magnesium Sulfate 10 meq/Calcium Gluconate 20 meq/ Multivitamins 10 ml/Chromium/

Copper/Manganese/ Seleni/Zn 0.5 ml/ Total Parenteral Nutrition/Amino 

Acids/Dextrose/ Fat Emulsion Intravenous 1,200 ml @  50 mls/hr TPN  CONT IV ;  

Start 3/22/20 at 22:00;  Stop 3/22/20 at 14:17;  Status DC


Sodium Chloride 90 meq/Potassium Chloride 15 meq/ Potassium Phosphate 10 mmol/ 

Magnesium Sulfate 10 meq/Calcium Gluconate 20 meq/ Multivitamins 10 ml/Chromium/

Copper/Manganese/ Seleni/Zn 0.5 ml/ Total Parenteral Nutrition/Amino 

Acids/Dextrose/ Fat Emulsion Intravenous 1,200 ml @  50 mls/hr TPN  CONT IV  

Last administered on 3/22/20at 23:29;  Start 3/22/20 at 22:00;  Stop 3/23/20 at 

21:59;  Status DC


Sodium Chloride 1,000 ml @  1,000 mls/hr Q1H PRN IV hypotension;  Start 3/23/20 

at 07:28;  Stop 3/23/20 at 13:27;  Status DC


Albumin Human 200 ml @  200 mls/hr 1X  ONCE IV  Last administered on 3/23/20at 

08:51;  Start 3/23/20 at 07:30;  Stop 3/23/20 at 08:29;  Status DC


Diphenhydramine HCl (Benadryl) 25 mg 1X PRN  PRN IV ITCHING;  Start 3/23/20 at 

07:30;  Stop 3/24/20 at 07:29;  Status DC


Diphenhydramine HCl (Benadryl) 25 mg 1X PRN  PRN IV ITCHING;  Start 3/23/20 at 

07:30;  Stop 3/24/20 at 07:29;  Status DC


Sodium Chloride 1,000 ml @  400 mls/hr Q2H30M PRN IV PATENCY;  Start 3/23/20 at 

07:28;  Stop 3/23/20 at 19:27;  Status DC


Info (PHARMACY MONITORING -- do not chart) 1 each PRN DAILY  PRN MC SEE 

COMMENTS;  Start 3/23/20 at 07:30;  Stop 4/3/20 at 13:01;  Status DC


Metronidazole 100 ml @  100 mls/hr Q6HRS IV  Last administered on 4/8/20at 

06:26;  Start 3/23/20 at 08:30;  Stop 4/8/20 at 09:58;  Status DC


Micafungin Sodium 100 mg/Dextrose 100 ml @  100 mls/hr Q24H IV  Last 

administered on 4/15/20at 08:54;  Start 3/23/20 at 09:00


Propofol 0 ml @ As Directed STK-MED ONCE IV ;  Start 3/23/20 at 07:53;  Stop 

3/23/20 at 07:53;  Status DC


Etomidate (Amidate) 20 mg STK-MED ONCE IV ;  Start 3/23/20 at 07:53;  Stop 

3/23/20 at 07:54;  Status DC


Midazolam HCl (Versed) 5 mg STK-MED ONCE .ROUTE ;  Start 3/23/20 at 07:57;  Stop

3/23/20 at 07:57;  Status DC


Fentanyl Citrate 30 ml @ 0 mls/hr CONT  PRN IV SEE PROTOCOL Last administered on

4/16/20at 03:26;  Start 3/23/20 at 08:15


Artificial Tears (Artificial Tears) 1 drop PRN Q1HR  PRN OU DRY EYE, 1st choice;

 Start 3/23/20 at 08:15


Midazolam HCl 50 mg/Sodium Chloride 50 ml @ 0 mls/hr CONT  PRN IV SEE PROTOCOL 

Last administered on 3/26/20at 22:39;  Start 3/23/20 at 08:15;  Stop 3/28/20 at 

15:59;  Status DC


Etomidate (Amidate) 8 mg 1X  ONCE IV  Last administered on 3/23/20at 08:33;  

Start 3/23/20 at 08:30;  Stop 3/23/20 at 08:31;  Status DC


Succinylcholine Chloride (Anectine) 120 mg 1X  ONCE IV  Last administered on 

3/23/20at 08:34;  Start 3/23/20 at 08:30;  Stop 3/23/20 at 08:31;  Status DC


Midazolam HCl (Versed) 5 mg 1X  ONCE IV ;  Start 3/23/20 at 08:30;  Stop 3/23/20

at 08:31;  Status DC


Potassium Chloride 15 meq/ Bicarbonate Dialysis Soln w/ out KCl 5,007.5 ml  @ 

1,000 mls/ hr Q5H1M IV  Last administered on 3/24/20at 11:11;  Start 3/23/20 at 

12:00;  Stop 3/24/20 at 11:15;  Status DC


Potassium Chloride 15 meq/ Bicarbonate Dialysis Soln w/ out KCl 5,007.5 ml  @ 

1,000 mls/ hr Q5H1M IV  Last administered on 3/24/20at 11:12;  Start 3/23/20 at 

12:00;  Stop 3/24/20 at 11:17;  Status DC


Potassium Chloride 15 meq/ Bicarbonate Dialysis Soln w/ out KCl 5,007.5 ml  @ 

1,000 mls/ hr Q5H1M IV  Last administered on 3/24/20at 11:11;  Start 3/23/20 at 

12:00;  Stop 3/24/20 at 11:19;  Status DC


Sodium Chloride 90 meq/Potassium Chloride 15 meq/ Potassium Phosphate 10 mmol/ 

Magnesium Sulfate 10 meq/Calcium Gluconate 20 meq/ Multivitamins 10 ml/Chromium/

Copper/Manganese/ Seleni/Zn 0.5 ml/ Total Parenteral Nutrition/Amino 

Acids/Dextrose/ Fat Emulsion Intravenous 1,400 ml @  58.333 mls/ hr TPN  CONT IV

 Last administered on 3/23/20at 21:42;  Start 3/23/20 at 22:00;  Stop 3/24/20 at

21:59;  Status DC


Heparin Sodium (Porcine) (Heparin Sodium) 5,000 unit Q8HRS SQ  Last administered

on 3/28/20at 05:55;  Start 3/23/20 at 15:00;  Stop 3/28/20 at 13:28;  Status DC


Meropenem 500 mg/ Sodium Chloride 50 ml @  100 mls/hr Q6HRS IV  Last 

administered on 3/25/20at 06:00;  Start 3/24/20 at 09:00;  Stop 3/25/20 at 

07:29;  Status DC


Potassium Phosphate 20 mmol/ Sodium Chloride 106.6667 ml @  51.667 m... 1X  ONCE

IV  Last administered on 3/24/20at 11:22;  Start 3/24/20 at 10:15;  Stop 3/24/20

at 12:18;  Status DC


Acetaminophen (Tylenol Supp) 650 mg PRN Q6HRS  PRN WI MILD PAIN / TEMP Last 

administered on 4/14/20at 08:01;  Start 3/24/20 at 10:30


Potassium Chloride/Water 100 ml @  100 mls/hr Q1H IV  Last administered on 

3/24/20at 12:12;  Start 3/24/20 at 11:00;  Stop 3/24/20 at 12:59;  Status DC


Potassium Chloride 20 meq/ Bicarbonate Dialysis Soln w/ out KCl 5,010 ml @  

1,000 mls/hr Q5H1M IV  Last administered on 3/25/20at 08:48;  Start 3/24/20 at 

12:00;  Stop 3/25/20 at 13:03;  Status DC


Potassium Chloride 20 meq/ Bicarbonate Dialysis Soln w/ out KCl 5,010 ml @  

1,000 mls/hr Q5H1M IV  Last administered on 3/29/20at 14:52;  Start 3/24/20 at 

11:30;  Stop 3/29/20 at 19:59;  Status DC


Potassium Chloride 20 meq/ Bicarbonate Dialysis Soln w/ out KCl 5,010 ml @  

1,000 mls/hr Q5H1M IV  Last administered on 3/29/20at 14:53;  Start 3/24/20 at 

11:30;  Stop 3/29/20 at 19:59;  Status DC


Sodium Chloride 90 meq/Potassium Chloride 15 meq/ Potassium Phosphate 15 mmol/ 

Magnesium Sulfate 10 meq/Calcium Gluconate 15 meq/ Multivitamins 10 ml/Chromium/

Copper/Manganese/ Seleni/Zn 0.5 ml/ Total Parenteral Nutrition/Amino 

Acids/Dextrose/ Fat Emulsion Intravenous 1,400 ml @  58.333 mls/ hr TPN  CONT IV

 Last administered on 3/24/20at 22:17;  Start 3/24/20 at 22:00;  Stop 3/25/20 at

21:59;  Status DC


Cefepime HCl (Maxipime) 2 gm Q12HR IVP  Last administered on 4/7/20at 20:56;  

Start 3/25/20 at 09:00;  Stop 4/8/20 at 09:58;  Status DC


Daptomycin 500 mg/ Sodium Chloride 50 ml @  100 mls/hr Q48H IV  Last 

administered on 4/10/20at 09:57;  Start 3/25/20 at 08:30;  Stop 4/10/20 at 

10:07;  Status DC


Lidocaine HCl (Buffered Lidocaine 1%) 3 ml 1X  ONCE INJ  Last administered on 

3/25/20at 10:27;  Start 3/25/20 at 10:30;  Stop 3/25/20 at 10:31;  Status DC


Potassium Phosphate 20 mmol/ Sodium Chloride 106.6667 ml @  51.667 m... 1X  ONCE

IV  Last administered on 3/25/20at 12:51;  Start 3/25/20 at 13:00;  Stop 3/25/20

at 15:03;  Status DC


Sodium Chloride 90 meq/Potassium Chloride 15 meq/ Potassium Phosphate 18 mmol/ 

Magnesium Sulfate 8 meq/Calcium Gluconate 15 meq/ Multivitamins 10 ml/Chromium/ 

Copper/Manganese/ Seleni/Zn 0.5 ml/ Total Parenteral Nutrition/Amino 

Acids/Dextrose/ Fat Emulsion Intravenous 1,400 ml @  58.333 mls/ hr TPN  CONT IV

 Last administered on 3/25/20at 22:16;  Start 3/25/20 at 22:00;  Stop 3/26/20 at

21:59;  Status DC


Potassium Chloride 20 meq/ Bicarbonate Dialysis Soln w/ out KCl 5,010 ml @  

1,000 mls/hr Q5H1M IV  Last administered on 3/29/20at 14:54;  Start 3/25/20 at 

16:00;  Stop 3/29/20 at 19:59;  Status DC


Multi-Ingred Cream/Lotion/Oil/ Oint (Artificial Tears Eye Ointment) 1 thomas PRN 

Q1HR  PRN OU DRY EYE, 2nd choice Last administered on 4/13/20at 08:19;  Start 

3/25/20 at 17:30


Sodium Chloride 90 meq/Potassium Chloride 15 meq/ Potassium Phosphate 18 mmol/ 

Magnesium Sulfate 8 meq/Calcium Gluconate 15 meq/ Multivitamins 10 ml/Chromium/ 

Copper/Manganese/ Seleni/Zn 0.5 ml/ Total Parenteral Nutrition/Amino 

Acids/Dextrose/ Fat Emulsion Intravenous 1,400 ml @  58.333 mls/ hr TPN  CONT IV

 Last administered on 3/26/20at 22:00;  Start 3/26/20 at 22:00;  Stop 3/27/20 at

21:59;  Status DC


Albumin Human 500 ml @  125 mls/hr 1X  ONCE IV ;  Start 3/26/20 at 14:15;  Stop 

3/26/20 at 18:14;  Status DC


Sodium Chloride 90 meq/Potassium Chloride 15 meq/ Potassium Phosphate 18 mmol/ 

Magnesium Sulfate 8 meq/Calcium Gluconate 15 meq/ Multivitamins 10 ml/Chromium/ 

Copper/Manganese/ Seleni/Zn 0.5 ml/ Insulin Human Regular 10 unit/ Total 

Parenteral Nutrition/Amino Acids/Dextrose/ Fat Emulsion Intravenous 1,400 ml @  

58.333 mls/ hr TPN  CONT IV  Last administered on 3/27/20at 21:43;  Start 

3/27/20 at 22:00;  Stop 3/28/20 at 21:59;  Status DC


Lidocaine HCl (Buffered Lidocaine 1%) 3 ml STK-MED ONCE .ROUTE ;  Start 3/25/20 

at 10:00;  Stop 3/27/20 at 13:57;  Status DC


Midazolam HCl 100 mg/Sodium Chloride 100 ml @ 7 mls/hr CONT  PRN IV SEE PROTOCOL

Last administered on 4/8/20at 15:35;  Start 3/28/20 at 16:00


Sodium Chloride 90 meq/Potassium Chloride 15 meq/ Potassium Phosphate 18 mmol/ 

Magnesium Sulfate 8 meq/Calcium Gluconate 15 meq/ Multivitamins 10 ml/Chromium/ 

Copper/Manganese/ Seleni/Zn 0.5 ml/ Insulin Human Regular 15 unit/ Total 

Parenteral Nutrition/Amino Acids/Dextrose/ Fat Emulsion Intravenous 1,400 ml @  

58.333 mls/ hr TPN  CONT IV  Last administered on 3/28/20at 20:34;  Start 

3/28/20 at 22:00;  Stop 3/29/20 at 21:59;  Status DC


Info ([facility] Electrolyte Protocol) 1 ea CONT PRN  PRN MC PER PROTOCOL;  Start 

3/29/20 at 13:15


Sodium Chloride 90 meq/Potassium Chloride 15 meq/ Potassium Phosphate 18 mmol/ 

Magnesium Sulfate 8 meq/Calcium Gluconate 15 meq/ Multivitamins 10 ml/Chromium/ 

Copper/Manganese/ Seleni/Zn 0.5 ml/ Insulin Human Regular 15 unit/ Total 

Parenteral Nutrition/Amino Acids/Dextrose/ Fat Emulsion Intravenous 1,400 ml @  

58.333 mls/ hr TPN  CONT IV  Last administered on 3/29/20at 22:05;  Start 

3/29/20 at 22:00;  Stop 3/30/20 at 21:59;  Status DC


Potassium Chloride 15 meq/ Bicarbonate Dialysis Soln w/ out KCl 5,007.5 ml  @ 

1,000 mls/ hr Q5H1M IV  Last administered on 4/1/20at 18:14;  Start 3/29/20 at 

20:00;  Stop 4/2/20 at 13:08;  Status DC


Potassium Chloride 15 meq/ Bicarbonate Dialysis Soln w/ out KCl 5,007.5 ml  @ 

1,000 mls/ hr Q5H1M IV  Last administered on 4/1/20at 18:14;  Start 3/29/20 at 

20:00;  Stop 4/2/20 at 13:08;  Status DC


Potassium Chloride 15 meq/ Bicarbonate Dialysis Soln w/ out KCl 5,007.5 ml  @ 

1,000 mls/ hr Q5H1M IV  Last administered on 4/1/20at 18:14;  Start 3/29/20 at 

20:00;  Stop 4/2/20 at 13:08;  Status DC


Iohexol (Omnipaque 240 Mg/ml) 30 ml 1X  ONCE PO  Last administered on 3/30/20at 

11:30;  Start 3/30/20 at 11:30;  Stop 3/30/20 at 11:33;  Status DC


Info (CONTRAST GIVEN -- Rx MONITORING) 1 each PRN DAILY  PRN MC SEE COMMENTS;  

Start 3/30/20 at 11:45;  Stop 4/1/20 at 11:44;  Status DC


Sodium Chloride 90 meq/Potassium Chloride 15 meq/ Potassium Phosphate 18 mmol/ 

Magnesium Sulfate 8 meq/Calcium Gluconate 15 meq/ Multivitamins 10 ml/Chromium/ 

Copper/Manganese/ Seleni/Zn 0.5 ml/ Insulin Human Regular 15 unit/ Total 

Parenteral Nutrition/Amino Acids/Dextrose/ Fat Emulsion Intravenous 1,400 ml @  

58.333 mls/ hr TPN  CONT IV  Last administered on 3/30/20at 21:47;  Start 

3/30/20 at 22:00;  Stop 3/31/20 at 21:59;  Status DC


Sodium Chloride 90 meq/Potassium Chloride 15 meq/ Potassium Phosphate 18 mmol/ 

Magnesium Sulfate 8 meq/Calcium Gluconate 15 meq/ Multivitamins 10 ml/Chromium/ 

Copper/Manganese/ Seleni/Zn 0.5 ml/ Insulin Human Regular 20 unit/ Total 

Parenteral Nutrition/Amino Acids/Dextrose/ Fat Emulsion Intravenous 1,400 ml @  

58.333 mls/ hr TPN  CONT IV  Last administered on 3/31/20at 21:36;  Start 

3/31/20 at 22:00;  Stop 4/1/20 at 21:59;  Status DC


Alteplase, Recombinant (Cathflo For Central Catheter Clearance) 1 mg 1X  ONCE 

INT CAT  Last administered on 3/31/20at 20:03;  Start 3/31/20 at 19:30;  Stop 

3/31/20 at 19:46;  Status DC


Alteplase, Recombinant (Cathflo For Central Catheter Clearance) 1 mg 1X  ONCE 

INT CAT  Last administered on 3/31/20at 22:05;  Start 3/31/20 at 22:00;  Stop 

3/31/20 at 22:01;  Status DC


Sodium Chloride 90 meq/Potassium Chloride 15 meq/ Potassium Phosphate 18 mmol/ 

Magnesium Sulfate 8 meq/Calcium Gluconate 15 meq/ Multivitamins 10 ml/Chromium/ 

Copper/Manganese/ Seleni/Zn 0.5 ml/ Insulin Human Regular 20 unit/ Total 

Parenteral Nutrition/Amino Acids/Dextrose/ Fat Emulsion Intravenous 1,400 ml @  

58.333 mls/ hr TPN  CONT IV  Last administered on 4/1/20at 21:30;  Start 4/1/20 

at 22:00;  Stop 4/2/20 at 21:59;  Status DC


Dexmedetomidine HCl 400 mcg/ Sodium Chloride 100 ml @ 0 mls/hr CONT  PRN IV 

ANXIETY / AGITATION Last administered on 4/16/20at 04:31;  Start 4/2/20 at 08:15


Sodium Chloride 500 ml @  500 mls/hr 1X PRN  PRN IV ELEVATED BP, SEE COMMENTS;  

Start 4/2/20 at 08:15


Atropine Sulfate (ATROPINE 0.5mg SYRINGE) 0.5 mg PRN Q5MIN  PRN IV SEE COMMENTS;

 Start 4/2/20 at 08:15


Furosemide (Lasix) 20 mg 1X  ONCE IVP  Last administered on 4/2/20at 08:19;  

Start 4/2/20 at 08:15;  Stop 4/2/20 at 08:16;  Status DC


Lidocaine HCl (Buffered Lidocaine 1%) 3 ml STK-MED ONCE .ROUTE ;  Start 4/2/20 

at 08:39;  Stop 4/2/20 at 08:39;  Status DC


Lidocaine HCl (Buffered Lidocaine 1%) 6 ml 1X  ONCE INJ  Last administered on 

4/2/20at 09:05;  Start 4/2/20 at 09:00;  Stop 4/2/20 at 09:06;  Status DC


Sodium Chloride 90 meq/Potassium Chloride 15 meq/ Potassium Phosphate 18 mmol/ 

Magnesium Sulfate 8 meq/Calcium Gluconate 15 meq/ Multivitamins 10 ml/Chromium/ 

Copper/Manganese/ Seleni/Zn 0.5 ml/ Insulin Human Regular 20 unit/ Total 

Parenteral Nutrition/Amino Acids/Dextrose/ Fat Emulsion Intravenous 1,400 ml @  

58.333 mls/ hr TPN  CONT IV  Last administered on 4/2/20at 22:45;  Start 4/2/20 

at 22:00;  Stop 4/3/20 at 21:59;  Status DC


Sodium Chloride 1,000 ml @  1,000 mls/hr Q1H PRN IV hypotension;  Start 4/3/20 a

t 07:30;  Stop 4/3/20 at 13:29;  Status DC


Albumin Human 200 ml @  200 mls/hr 1X PRN  PRN IV Hypotension Last administered 

on 4/3/20at 09:36;  Start 4/3/20 at 07:30;  Stop 4/3/20 at 13:29;  Status DC


Sodium Chloride (Normal Saline Flush) 10 ml 1X PRN  PRN IV AP catheter pack;  

Start 4/3/20 at 07:30;  Stop 4/3/20 at 21:29;  Status DC


Sodium Chloride (Normal Saline Flush) 10 ml 1X PRN  PRN IV  catheter pack;  

Start 4/3/20 at 07:30;  Stop 4/4/20 at 07:29;  Status DC


Sodium Chloride 1,000 ml @  400 mls/hr Q2H30M PRN IV PATENCY;  Start 4/3/20 at 

07:30;  Stop 4/3/20 at 19:29;  Status DC


Info (PHARMACY MONITORING -- do not chart) 1 each PRN DAILY  PRN MC SEE 

COMMENTS;  Start 4/3/20 at 07:30;  Stop 4/3/20 at 13:02;  Status DC


Info (PHARMACY MONITORING -- do not chart) 1 each PRN DAILY  PRN MC SEE 

COMMENTS;  Start 4/3/20 at 07:30;  Stop 4/5/20 at 12:45;  Status DC


Sodium Chloride 90 meq/Potassium Chloride 15 meq/ Potassium Phosphate 10 mmol/ 

Magnesium Sulfate 8 meq/Calcium Gluconate 15 meq/ Multivitamins 10 ml/Chromium/ 

Copper/Manganese/ Seleni/Zn 0.5 ml/ Insulin Human Regular 25 unit/ Total 

Parenteral Nutrition/Amino Acids/Dextrose/ Fat Emulsion Intravenous 1,400 ml @  

58.333 mls/ hr TPN  CONT IV  Last administered on 4/3/20at 22:19;  Start 4/3/20 

at 22:00;  Stop 4/4/20 at 21:59;  Status DC


Heparin Sodium (Porcine) (Heparin Sodium) 5,000 unit Q12HR SQ  Last administered

on 4/15/20at 21:16;  Start 4/3/20 at 21:00


Ondansetron HCl (Zofran) 4 mg PRN Q6HRS  PRN IV NAUSEA/VOMITING;  Start 4/6/20 

at 07:00;  Stop 4/7/20 at 06:59;  Status DC


Fentanyl Citrate (Fentanyl 2ml Vial) 25 mcg PRN Q5MIN  PRN IV MILD PAIN 1-3;  

Start 4/6/20 at 07:00;  Stop 4/7/20 at 06:59;  Status DC


Fentanyl Citrate (Fentanyl 2ml Vial) 50 mcg PRN Q5MIN  PRN IV MODERATE TO SEVERE

PAIN;  Start 4/6/20 at 07:00;  Stop 4/7/20 at 06:59;  Status DC


Ringer's Solution 1,000 ml @  30 mls/hr Q24H IV ;  Start 4/6/20 at 07:00;  Stop 

4/6/20 at 18:59;  Status DC


Lidocaine HCl (Xylocaine-Mpf 1% 2ml Vial) 2 ml PRN 1X  PRN ID PRIOR TO IV START;

 Start 4/6/20 at 07:00;  Stop 4/7/20 at 06:59;  Status DC


Prochlorperazine Edisylate (Compazine) 5 mg PACU PRN  PRN IV NAUSEA, MRX1;  

Start 4/6/20 at 07:00;  Stop 4/7/20 at 06:59;  Status DC


Sodium Chloride 1,000 ml @  1,000 mls/hr Q1H PRN IV hypotension;  Start 4/4/20 

at 09:10;  Stop 4/4/20 at 15:09;  Status DC


Albumin Human 200 ml @  200 mls/hr 1X PRN  PRN IV Hypotension Last administered 

on 4/4/20at 10:10;  Start 4/4/20 at 09:15;  Stop 4/4/20 at 15:14;  Status DC


Sodium Chloride 1,000 ml @  400 mls/hr Q2H30M PRN IV PATENCY;  Start 4/4/20 at 

09:10;  Stop 4/4/20 at 21:09;  Status DC


Info (PHARMACY MONITORING -- do not chart) 1 each PRN DAILY  PRN  SEE [facility]

MENTS;  Start 4/4/20 at 09:15;  Stop 4/5/20 at 12:45;  Status DC


Info (PHARMACY MONITORING -- do not chart) 1 each PRN DAILY  PRN MC SEE 

COMMENTS;  Start 4/4/20 at 09:15;  Stop 4/5/20 at 12:45;  Status DC


Sodium Chloride 90 meq/Potassium Chloride 15 meq/ Potassium Phosphate 10 mmol/ 

Magnesium Sulfate 8 meq/Calcium Gluconate 15 meq/ Multivitamins 10 ml/Chromium/ 

Copper/Manganese/ Seleni/Zn 0.5 ml/ Insulin Human Regular 25 unit/ Total 

Parenteral Nutrition/Amino Acids/Dextrose/ Fat Emulsion Intravenous 1,400 ml @  

58.333 mls/ hr TPN  CONT IV  Last administered on 4/4/20at 22:10;  Start 4/4/20 

at 22:00;  Stop 4/5/20 at 21:59;  Status DC


Magnesium Sulfate 50 ml @ 25 mls/hr PRN DAILY  PRN IV for Mag < 1.7 on am labs; 

Start 4/5/20 at 09:15


Sodium Chloride 90 meq/Potassium Chloride 15 meq/ Potassium Phosphate 10 mmol/ 

Magnesium Sulfate 8 meq/Calcium Gluconate 15 meq/ Multivitamins 10 ml/Chromium/ 

Copper/Manganese/ Seleni/Zn 0.5 ml/ Insulin Human Regular 25 unit/ Total 

Parenteral Nutrition/Amino Acids/Dextrose/ Fat Emulsion Intravenous 1,400 ml @  

58.333 mls/ hr TPN  CONT IV  Last administered on 4/5/20at 21:20;  Start 4/5/20 

at 22:00;  Stop 4/6/20 at 21:59;  Status DC


Sodium Chloride 1,000 ml @  1,000 mls/hr Q1H PRN IV hypotension;  Start 4/5/20 

at 12:23;  Stop 4/5/20 at 18:22;  Status DC


Albumin Human 200 ml @  200 mls/hr 1X  ONCE IV  Last administered on 4/5/20at 

13:34;  Start 4/5/20 at 12:30;  Stop 4/5/20 at 13:29;  Status DC


Diphenhydramine HCl (Benadryl) 25 mg 1X PRN  PRN IV ITCHING;  Start 4/5/20 at 

12:30;  Stop 4/6/20 at 12:29;  Status DC


Diphenhydramine HCl (Benadryl) 25 mg 1X PRN  PRN IV ITCHING;  Start 4/5/20 at 

12:30;  Stop 4/6/20 at 12:29;  Status DC


Info (PHARMACY MONITORING -- do not chart) 1 each PRN DAILY  PRN MC SEE 

COMMENTS;  Start 4/5/20 at 12:30;  Status Cancel


Bupivacaine HCl/ Epinephrine Bitart (Sensorcain-Epi 0.5%-1:[id number] Mpf) 30 ml 

STK-MED ONCE .ROUTE  Last administered on 4/6/20at 11:44;  Start 4/6/20 at 

11:00;  Stop 4/6/20 at 11:01;  Status DC


Cellulose (Surgicel Fibrillar 1x2) 1 each STK-MED ONCE .ROUTE ;  Start 4/6/20 at

11:00;  Stop 4/6/20 at 11:01;  Status DC


Sodium Chloride 90 meq/Potassium Chloride 15 meq/ Potassium Phosphate 10 mmol/ 

Magnesium Sulfate 12 meq/Calcium Gluconate 15 meq/ Multivitamins 10 ml/Chromium/

Copper/Manganese/ Seleni/Zn 0.5 ml/ Insulin Human Regular 25 unit/ Total 

Parenteral Nutrition/Amino Acids/Dextrose/ Fat Emulsion Intravenous 1,400 ml @  

58.333 mls/ hr TPN  CONT IV  Last administered on 4/6/20at 22:24;  Start 4/6/20 

at 22:00;  Stop 4/7/20 at 21:59;  Status DC


Propofol 20 ml @ As Directed STK-MED ONCE IV ;  Start 4/6/20 at 11:07;  Stop 

4/6/20 at 11:07;  Status DC


Cellulose (Surgicel Hemostat 4x8) 1 each STK-MED ONCE .ROUTE  Last administered 

on 4/6/20at 11:44;  Start 4/6/20 at 11:55;  Stop 4/6/20 at 11:56;  Status DC


Sevoflurane (Ultane) 60 ml STK-MED ONCE IH ;  Start 4/6/20 at 12:46;  Stop 

4/6/20 at 12:46;  Status DC


Sodium Chloride 1,000 ml @  1,000 mls/hr Q1H PRN IV hypotension;  Start 4/6/20 

at 13:51;  Stop 4/6/20 at 19:50;  Status DC


Albumin Human 200 ml @  200 mls/hr 1X PRN  PRN IV Hypotension Last administered 

on 4/6/20at 14:51;  Start 4/6/20 at 14:00;  Stop 4/6/20 at 19:59;  Status DC


Diphenhydramine HCl (Benadryl) 25 mg 1X PRN  PRN IV ITCHING;  Start 4/6/20 at 

14:00;  Stop 4/7/20 at 13:59;  Status DC


Diphenhydramine HCl (Benadryl) 25 mg 1X PRN  PRN IV ITCHING;  Start 4/6/20 at 

14:00;  Stop 4/7/20 at 13:59;  Status DC


Sodium Chloride 1,000 ml @  400 mls/hr Q2H30M PRN IV PATENCY;  Start 4/6/20 at 

13:51;  Stop 4/7/20 at 01:50;  Status DC


Info (PHARMACY MONITORING -- do not chart) 1 each PRN DAILY  PRN MC SEE 

COMMENTS;  Start 4/6/20 at 14:00;  Stop 4/9/20 at 08:16;  Status DC


Heparin Sodium (Porcine) (Hep Lock Adult) 500 unit STK-MED ONCE IVP ;  Start 

4/7/20 at 09:29;  Stop 4/7/20 at 09:30;  Status DC


Sodium Chloride 1,000 ml @  1,000 mls/hr Q1H PRN IV hypotension;  Start 4/7/20 

at 10:43;  Stop 4/7/20 at 16:42;  Status DC


Sodium Chloride 1,000 ml @  400 mls/hr Q2H30M PRN IV PATENCY;  Start 4/7/20 at 

10:43;  Stop 4/7/20 at 22:42;  Status DC


Info (PHARMACY MONITORING -- do not chart) 1 each PRN DAILY  PRN MC SEE 

COMMENTS;  Start 4/7/20 at 10:45;  Status UNV


Info (PHARMACY MONITORING -- do not chart) 1 each PRN DAILY  PRN MC SEE 

COMMENTS;  Start 4/7/20 at 10:45;  Status UNV


Sodium Chloride 90 meq/Potassium Chloride 15 meq/ Magnesium Sulfate 12 

meq/Calcium Gluconate 15 meq/ Multivitamins 10 ml/Chromium/ Copper/Manganese/ 

Seleni/Zn 0.5 ml/ Insulin Human Regular 25 unit/ Total Parenteral 

Nutrition/Amino Acids/Dextrose/ Fat Emulsion Intravenous 1,400 ml @  58.333 mls/

hr TPN  CONT IV  Last administered on 4/7/20at 22:13;  Start 4/7/20 at 22:00;  

Stop 4/8/20 at 21:59;  Status DC


Sodium Chloride 1,000 ml @  1,000 mls/hr Q1H PRN IV hypotension;  Start 4/8/20 

at 07:50;  Stop 4/8/20 at 13:49;  Status DC


Albumin Human 200 ml @  200 mls/hr 1X  ONCE IV ;  Start 4/8/20 at 08:00;  Stop 

4/8/20 at 08:53;  Status DC


Diphenhydramine HCl (Benadryl) 25 mg 1X PRN  PRN IV ITCHING;  Start 4/8/20 at 

08:00;  Stop 4/9/20 at 07:59;  Status DC


Diphenhydramine HCl (Benadryl) 25 mg 1X PRN  PRN IV ITCHING;  Start 4/8/20 at 

08:00;  Stop 4/9/20 at 07:59;  Status DC


Info (PHARMACY MONITORING -- do not chart) 1 each PRN DAILY  PRN MC SEE 

COMMENTS;  Start 4/8/20 at 08:00;  Stop 4/9/20 at 08:16;  Status DC


Albumin Human 50 ml @ 50 mls/hr 1X  ONCE IV ;  Start 4/8/20 at 08:53;  Stop 

4/8/20 at 08:56;  Status DC


Albumin Human 200 ml @  50 mls/hr PRN 1X  PRN IV HYPOTENSION Last administered 

on 4/14/20at 11:54;  Start 4/8/20 at 09:00


Meropenem 500 mg/ Sodium Chloride 50 ml @  100 mls/hr Q12H IV  Last administered

on 4/15/20at 22:21;  Start 4/8/20 at 10:00


Sodium Chloride 90 meq/Magnesium Sulfate 12 meq/ Calcium Gluconate 15 meq/ 

Multivitamins 10 ml/Chromium/ Copper/Manganese/ Seleni/Zn 0.5 ml/ Insulin Human 

Regular 25 unit/ Total Parenteral Nutrition/Amino Acids/Dextrose/ Fat Emulsion 

Intravenous 1,400 ml @  58.333 mls/ hr TPN  CONT IV  Last administered on 

4/8/20at 21:41;  Start 4/8/20 at 22:00;  Stop 4/9/20 at 21:59;  Status DC


Sodium Chloride 1,000 ml @  1,000 mls/hr Q1H PRN IV hypotension;  Start 4/9/20 

at 07:58;  Stop 4/9/20 at 13:57;  Status DC


Albumin Human 200 ml @  200 mls/hr 1X PRN  PRN IV Hypotension Last administered 

on 4/9/20at 09:30;  Start 4/9/20 at 08:00;  Stop 4/9/20 at 13:59;  Status DC


Sodium Chloride 1,000 ml @  400 mls/hr Q2H30M PRN IV PATENCY;  Start 4/9/20 at 

07:58;  Stop 4/9/20 at 19:57;  Status DC


Info (PHARMACY MONITORING -- do not chart) 1 each PRN DAILY  PRN MC SEE C

OMMENTS;  Start 4/9/20 at 08:00;  Status Cancel


Info (PHARMACY MONITORING -- do not chart) 1 each PRN DAILY  PRN MC SEE COMMENTS

;  Start 4/9/20 at 08:15;  Status UNV


Sodium Chloride 90 meq/Potassium Phosphate 5 mmol/ Magnesium Sulfate 12 

meq/Calcium Gluconate 15 meq/ Multivitamins 10 ml/Chromium/ Copper/Manganese/ 

Seleni/Zn 0.5 ml/ Insulin Human Regular 30 unit/ Total Parenteral Nu

trition/Amino Acids/Dextrose/ Fat Emulsion Intravenous 1,400 ml @  58.333 mls/ 

hr TPN  CONT IV  Last administered on 4/9/20at 22:08;  Start 4/9/20 at 22:00;  

Stop 4/10/20 at 21:59;  Status DC


Linezolid/Dextrose 300 ml @  300 mls/hr Q12HR IV  Last administered on 4/15/20at

21:16;  Start 4/10/20 at 11:00


Sodium Chloride 90 meq/Potassium Phosphate 15 mmol/ Magnesium Sulfate 12 meq/Hunter

cium Gluconate 15 meq/ Multivitamins 10 ml/Chromium/ Copper/Manganese/ Seleni/Zn

0.5 ml/ Insulin Human Regular 30 unit/ Total Parenteral Nutrition/Amino 

Acids/Dextrose/ Fat Emulsion Intravenous 1,400 ml @  58.333 mls/ hr TPN  CONT IV

 Last administered on 4/10/20at 21:49;  Start 4/10/20 at 22:00;  Stop 4/11/20 at

21:59;  Status DC


Sodium Chloride 90 meq/Potassium Phosphate 15 mmol/ Magnesium Sulfate 12 

meq/Calcium Gluconate 15 meq/ Multivitamins 10 ml/Chromium/ Copper/Manganese/ 

Seleni/Zn 0.5 ml/ Insulin Human Regular 40 unit/ Total Parenteral 

Nutrition/Amino Acids/Dextrose/ Fat Emulsion Intravenous 1,400 ml @  58.333 mls/

hr TPN  CONT IV  Last administered on 4/11/20at 21:21;  Start 4/11/20 at 22:00; 

Stop 4/12/20 at 21:59;  Status DC


Sodium Chloride 1,000 ml @  1,000 mls/hr Q1H PRN IV hypotension;  Start 4/11/20 

at 13:26;  Stop 4/11/20 at 19:25;  Status DC


Albumin Human 200 ml @  200 mls/hr 1X PRN  PRN IV Hypotension Last administered 

on 4/11/20at 15:00;  Start 4/11/20 at 13:30;  Stop 4/11/20 at 19:29;  Status DC


Sodium Chloride (Normal Saline Flush) 10 ml 1X PRN  PRN IV AP catheter pack;  

Start 4/11/20 at 13:30;  Stop 4/12/20 at 13:29;  Status DC


Sodium Chloride (Normal Saline Flush) 10 ml 1X PRN  PRN IV  catheter pack;  

Start 4/11/20 at 13:30;  Stop 4/12/20 at 13:29;  Status DC


Sodium Chloride 1,000 ml @  400 mls/hr Q2H30M PRN IV PATENCY;  Start 4/11/20 at 

13:26;  Stop 4/12/20 at 01:25;  Status DC


Info (PHARMACY MONITORING -- do not chart) 1 each PRN DAILY  PRN MC SEE 

COMMENTS;  Start 4/11/20 at 13:30;  Stop 4/11/20 at 13:33;  Status DC


Info (PHARMACY MONITORING -- do not chart) 1 each PRN DAILY  PRN MC SEE 

COMMENTS;  Start 4/11/20 at 13:30;  Stop 4/11/20 at 13:34;  Status DC


Sodium Chloride 90 meq/Potassium Phosphate 19 mmol/ Magnesium Sulfate 12 

meq/Calcium Gluconate 15 meq/ Multivitamins 10 ml/Chromium/ Copper/Manganese/ 

Seleni/Zn 0.5 ml/ Insulin Human Regular 40 unit/ Total Parenteral 

Nutrition/Amino Acids/Dextrose/ Fat Emulsion Intravenous 1,400 ml @  58.333 mls/

hr TPN  CONT IV  Last administered on 4/12/20at 21:54;  Start 4/12/20 at 22:00; 

Stop 4/13/20 at 21:59;  Status DC


Sodium Chloride 1,000 ml @  1,000 mls/hr Q1H PRN IV hypotension;  Start 4/13/20 

at 09:35;  Stop 4/13/20 at 15:34;  Status DC


Albumin Human 200 ml @  200 mls/hr 1X PRN  PRN IV Hypotension;  Start 4/13/20 at

09:45;  Stop 4/13/20 at 15:44;  Status DC


Diphenhydramine HCl (Benadryl) 25 mg 1X PRN  PRN IV ITCHING;  Start 4/13/20 at 

09:45;  Stop 4/14/20 at 09:44;  Status DC


Diphenhydramine HCl (Benadryl) 25 mg 1X PRN  PRN IV ITCHING;  Start 4/13/20 at 

09:45;  Stop 4/14/20 at 09:44;  Status DC


Sodium Chloride 1,000 ml @  400 mls/hr Q2H30M PRN IV PATENCY;  Start 4/13/20 at 

09:35;  Stop 4/13/20 at 21:34;  Status DC


Info (PHARMACY MONITORING -- do not chart) 1 each PRN DAILY  PRN MC SEE COMMENTS

;  Start 4/13/20 at 09:45;  Status Cancel


Sodium Chloride 100 meq/Potassium Phosphate 19 mmol/ Magnesium Sulfate 12 

meq/Calcium Gluconate 15 meq/ Multivitamins 10 ml/Chromium/ Copper/Manganese/ 

Seleni/Zn 0.5 ml/ Insulin Human Regular 40 unit/ Potassium Chloride 20 meq/ 

Total Parenteral Nutrition/Amino Acids/Dextrose/ Fat Emulsion Intravenous 1,400 

ml @  58.333 mls/ hr TPN  CONT IV  Last administered on 4/13/20at 22:02;  Start 

4/13/20 at 22:00;  Stop 4/14/20 at 21:59;  Status DC


Furosemide (Lasix) 40 mg 1X  ONCE IVP  Last administered on 4/13/20at 14:39;  

Start 4/13/20 at 14:30;  Stop 4/13/20 at 14:31;  Status DC


Metronidazole 100 ml @  100 mls/hr Q8HRS IV  Last administered on 4/16/20at 

06:09;  Start 4/14/20 at 10:00


Sodium Chloride 1,000 ml @  1,000 mls/hr Q1H PRN IV hypotension;  Start 4/14/20 

at 08:00;  Stop 4/14/20 at 13:59;  Status DC


Albumin Human 200 ml @  200 mls/hr 1X PRN  PRN IV Hypotension;  Start 4/14/20 at

08:00;  Stop 4/14/20 at 13:59;  Status DC


Sodium Chloride 1,000 ml @  400 mls/hr Q2H30M PRN IV PATENCY;  Start 4/14/20 at 

08:00;  Stop 4/14/20 at 19:59;  Status DC


Info (PHARMACY MONITORING -- do not chart) 1 each PRN DAILY  PRN MC SEE 

COMMENTS;  Start 4/14/20 at 11:30;  Status UNV


Info (PHARMACY MONITORING -- do not chart) 1 each PRN DAILY  PRN MC SEE 

COMMENTS;  Start 4/14/20 at 11:30


Sodium Chloride 100 meq/Potassium Phosphate 19 mmol/ Magnesium Sulfate 12 

meq/Calcium Gluconate 15 meq/ Multivitamins 10 ml/Chromium/ Copper/Manganese/ 

Seleni/Zn 0.5 ml/ Insulin Human Regular 40 unit/ Potassium Chloride 20 meq/ 

Total Parenteral Nutrition/Amino Acids/Dextrose/ Fat Emulsion Intravenous 1,400 

ml @  58.333 mls/ hr TPN  CONT IV  Last administered on 4/14/20at 21:52;  Start 

4/14/20 at 22:00;  Stop 4/15/20 at 21:59;  Status DC


Sodium Chloride (Normal Saline Flush) 10 ml QSHIFT  PRN IV AFTER MEDS AND BLOOD 

DRAWS;  Start 4/14/20 at 15:00


Sodium Chloride (Normal Saline Flush) 10 ml PRN Q5MIN  PRN IV AFTER MEDS AND 

BLOOD DRAWS;  Start 4/14/20 at 15:00


Sodium Chloride (Normal Saline Flush) 20 ml PRN Q5MIN  PRN IV AFTER MEDS AND 

BLOOD DRAWS;  Start 4/14/20 at 15:00


Sodium Chloride 100 meq/Potassium Phosphate 19 mmol/ Magnesium Sulfate 12 

meq/Calcium Gluconate 15 meq/ Multivitamins 10 ml/Chromium/ Copper/Manganese/ 

Seleni/Zn 0.5 ml/ Insulin Human Regular 40 unit/ Potassium Chloride 20 meq/ 

Total Parenteral Nutrition/Amino Acids/Dextrose/ Fat Emulsion Intravenous 1,400 

ml @  58.333 mls/ hr TPN  CONT IV  Last administered on 4/15/20at 21:20;  Start 

4/15/20 at 22:00;  Stop 4/16/20 at 21:59


Lidocaine HCl (Buffered Lidocaine 1%) 3 ml STK-MED ONCE .ROUTE ;  Start 4/15/20 

at 13:16;  Stop 4/15/20 at 13:16;  Status DC


Lidocaine HCl (Buffered Lidocaine 1%) 6 ml 1X  ONCE INJ  Last administered on 

4/15/20at 13:45;  Start 4/15/20 at 13:30;  Stop 4/15/20 at 13:31;  Status DC


Albumin Human 100 ml @  100 mls/hr 1X  ONCE IV  Last administered on 4/15/20at 

15:41;  Start 4/15/20 at 15:00;  Stop 4/15/20 at 15:59;  Status DC


Albumin Human 50 ml @ 50 mls/hr 1X  ONCE IV  Last administered on 4/15/20at 

15:00;  Start 4/15/20 at 15:00;  Stop 4/15/20 at 15:59;  Status DC





Active Scripts


Active


Reported


Bisoprolol Fumarate 5 Mg Tablet 10 Mg PO DAILY


Vitals/I & O





Vital Sign - Last 24 Hours








 4/15/20 4/15/20 4/15/20 4/15/20





 08:46 09:00 10:00 10:06


 


Pulse  88 88 


 


Resp 23 19 26 


 


B/P (MAP)  154/81 (105) 114/63 (80) 


 


Pulse Ox 99 99 100 100


 


O2 Delivery Ventilator Ventilator Ventilator Ventilator





 4/15/20 4/15/20 4/15/20 4/15/20





 11:00 11:36 12:00 12:00


 


Temp   99.5 





   99.5 


 


Pulse 81  84 


 


Resp 21  22 


 


B/P (MAP) 133/77 (95)  137/74 (95) 


 


Pulse Ox 99 100 98 


 


O2 Delivery Ventilator Ventilator Ventilator Mechanical Ventilator


 


    





    





 4/15/20 4/15/20 4/15/20 4/15/20





 12:50 13:00 14:00 14:20


 


Pulse  88 82 


 


Resp  23 18 20


 


B/P (MAP)  147/84 (105) 140/85 (103) 


 


Pulse Ox 100 99 99 99


 


O2 Delivery Ventilator Ventilator Ventilator Ventilator





 4/15/20 4/15/20 4/15/20 4/15/20





 15:00 15:00 15:15 16:00


 


Pulse  76  


 


Resp 16 18  


 


B/P (MAP)  100/54 (69)  


 


Pulse Ox 99 97 100 


 


O2 Delivery  Ventilator Ventilator Mechanical Ventilator





 4/15/20 4/15/20 4/15/20 4/15/20





 16:00 17:00 18:00 19:00


 


Temp  99.2  





  99.2  


 


Pulse 78 76 77 88


 


Resp 20 19 23 18


 


B/P (MAP) 138/75 (96) 108/70 (83) 114/75 (88) 124/75 (91)


 


Pulse Ox 98 100 100 100


 


O2 Delivery Ventilator Ventilator Ventilator Ventilator


 


    





    





 4/15/20 4/15/20 4/15/20 4/15/20





 20:00 20:00 20:50 21:00


 


Temp  99.1  





  99.1  


 


Pulse  76  79


 


Resp  19  19


 


B/P (MAP)  122/66 (84)  133/80 (97)


 


Pulse Ox  100 100 100


 


O2 Delivery Mechanical Ventilator Ventilator Ventilator Ventilator


 


    





    





 4/15/20 4/15/20 4/15/20 4/15/20





 21:18 21:48 22:00 23:00


 


Pulse   77 77


 


Resp 18 18 18 22


 


B/P (MAP)   112/64 (80) 111/64 (80)


 


Pulse Ox 100 100 100 100


 


O2 Delivery Ventilator Ventilator Ventilator Ventilator





 4/15/20 4/16/20 4/16/20 4/16/20





 23:43 00:00 00:00 01:00


 


Temp  100.0  





  100.0  


 


Pulse  86  91


 


Resp  25  21


 


B/P (MAP)  163/90 (114)  160/89 (112)


 


Pulse Ox 100 100  99


 


O2 Delivery Ventilator Ventilator Mechanical Ventilator Ventilator


 


    





    





 4/16/20 4/16/20 4/16/20 4/16/20





 02:00 03:00 03:26 04:00


 


Pulse 89 81  


 


Resp 19 23 18 


 


B/P (MAP) 159/93 (115) 112/70 (84)  


 


Pulse Ox 99 100 100 


 


O2 Delivery Ventilator Ventilator Ventilator Mechanical Ventilator





 4/16/20 4/16/20 4/16/20 4/16/20





 04:00 04:00 05:00 06:00


 


Temp  99.8  





  99.8  


 


Pulse  81 78 105


 


Resp  18 20 26


 


B/P (MAP)  116/69 (85) 112/75 (87) 168/91 (116)


 


Pulse Ox 99 99 100 100


 


O2 Delivery Ventilator Ventilator Ventilator Ventilator














Intake and Output   


 


 4/15/20 4/15/20 4/16/20





 15:00 23:00 07:00


 


Intake Total 300 ml 1419.54 ml 1253 ml


 


Output Total 4575 ml 290 ml 755 ml


 


Balance -4275 ml 1129.54 ml 498 ml











Hemodynamically unstable?:  No


Is patient in severe pain?:  No


Is NPO status required?:  No











GAETANO GONCALVES MD        Apr 16, 2020 08:17

## 2020-04-16 NOTE — NUR
[id number] AM meds on hold,dialysis nurse Leda preparing for run. RT in to assist 
transport-116 for dialysis. Patient "anxious" today. More aware as reported by others who 
have taken care of her past weeks. Short term memory noted . Reinforcement an hourly basis 
of events and POC  while  she has been hospitalized hospital

## 2020-04-16 NOTE — NUR
Pharmacy TPN Dosing Note



S: JESENIA RICH is a 49 year old F Currently receiving Central Continuous TPN started 
03/18/20



B:Pertinent PMH: Necrotizing pancreatitis

Height: 5 feet, 8 inches

Weight: 105 kg



Current diet: NPO 



LABS:

Sodium:    139 

Potassium: 3.9 

Chloride:  102 

Calcium:   9.1 

Corrected Calcium: 9.82 

Magnesium: 2 

CO2:       25 

SCr:       1.8 

Glucose:   106-158 

Albumin:   3.1 

AST:       31 

ALT:       16 



TPN FORMULA:

TPN TYPE:  Central Continuous

AMINO ACIDS:         125 gm

DEXTROSE:            225 gm

LIPIDS:              20 gm



SODIUM CHLORIDE:     100 mEq

POTASSIUM CHLORIDE:  20 mEq

POTASSIUM PHOSPHATE: 10 mmol

MAGNESIUM:           12 mEq

CALCIUM:             15 mEq

INSULIN:             35 units

MULTIPLE VITAMIN:    10 ml

TRACE ELEMENTS:      0.5 ml(s)



TPN PLAN:  

Potassium phos and insulin reduced in TPN per labs.





R: Change TPN per plan and ordered formula

Will monitor electrolytes, glucose, and tolerance to TPN.



 Maribell Vazquez [facility], 04/16/20 [id number]

## 2020-04-16 NOTE — PDOC
Infectious Disease Note


Subjective


Subjective


Alert and some better after paracentesis


States ok after suctioning


Trach, vent FiO2 35 % PEEP 5


TPN





ROS


ROS


difficult to ascertain





Vital Sign


Vital Signs





Vital Signs








  Date Time  Temp Pulse Resp B/P (MAP) Pulse Ox O2 Delivery O2 Flow Rate FiO2


 


4/16/20 06:00  105 26 168/91 (116) 100 Ventilator  


 


4/16/20 04:00 99.8       





 99.8       











Physical Exam


PHYSICAL EXAM


GENERAL: Alert and coop. mouthing words  Appears comfortable has generalized 

anasarca - s/p suctioning with RT. Feels warm


HEENT: Pupils equal, + NGT, oral cavity dry 


NECK:  Trach/vent 


LUNGS: Diminished aeration 


HEART:  S1, S2, regular 


ABDOMEN:  Less Distended, hypoactive BS, 


: Pepe  changed 4/14


EXTREMITIES: Generalized edema, no cyanosis, SCDs bilaterally 


DERMATOLOGIC:  Warm and dry.  No generalized rash.  


CENTRAL NERVOUS SYSTEM: Responsive and seems appropriate


HDC, LIJ (4/14) and


RUE-PICC removed 4/14





Labs


Lab





Laboratory Tests








Test


 4/15/20


11:57 4/15/20


12:35 4/15/20


23:48 4/16/20


06:15


 


Glucose (Fingerstick)


 164 mg/dL


(70-99) 


 158 mg/dL


(70-99) 





 


Prothrombin Time


 


 15.6 SEC


(11.7-14.0) 


 





 


Prothromb Time International


Ratio 


 1.3 (0.8-1.1) 


 


 





 


Sodium Level


 


 


 


 139 mmol/L


(136-145)


 


Potassium Level


 


 


 


 3.9 mmol/L


(3.5-5.1)


 


Chloride Level


 


 


 


 102 mmol/L


()


 


Carbon Dioxide Level


 


 


 


 25 mmol/L


(21-32)


 


Anion Gap    12 (6-14) 


 


Blood Urea Nitrogen


 


 


 


 88 mg/dL


(7-20)


 


Creatinine


 


 


 


 1.8 mg/dL


(0.6-1.0)


 


Estimated GFR


(Cockcroft-Gault) 


 


 


 29.9 





 


Glucose Level


 


 


 


 120 mg/dL


(70-99)


 


Calcium Level


 


 


 


 9.1 mg/dL


(8.5-10.1)


 


Phosphorus Level


 


 


 


 5.1 mg/dL


(2.6-4.7)


 


Magnesium Level


 


 


 


 2.0 mg/dL


(1.8-2.4)


 


Test


 4/16/20


06:18 


 


 





 


Glucose (Fingerstick)


 106 mg/dL


(70-99) 


 


 











Micro


CT Chest Abd/pelv 4/14


CT CHEST:


CT scan the chest was done without contrast. There is a tracheostomy tube 


in good position. There are large bilateral pleural effusions. There is 


atelectasis in both lung bases. There is no mediastinal adenopathy. Right 


arm PICC line extends to the superior vena cava. There is a temporary 


dialysis catheter extending to the vena cava near the atrium.


 


IMPRESSION:


1. Large bilateral effusions.


2. Atelectasis of both lungs.


 


End impression


 


CT abdomen pelvis


 


CT scan the abdomen pelvis was done following CT chest with contrast. NG 


tube extends into the stomach. Spleen is normal. There is no mass or 


hydronephrosis in the kidneys. There is a gallstone the gallbladder. A 


focal liver lesion is not identified. There is diffuse necrosis of the 


pancreas. There is peripancreatic fluid. The pattern is similar to the 


recent study. There is fluid in the paracolic gutters. There is 


retroperitoneal fluid surrounding the kidneys. Ascites extends into the 


pelvis. Ascites is similar to the prior study. There is no bowel 


obstruction. There is no free air.


 


IMPRESSION:


1. Diffuse pancreatic necrosis.


2. A extensive retroperitoneal fluid and inflammation.


3. Cholelithiasis.


4. Moderate to large volume ascites with little change.



































CT A/P, 4/9


IMPRESSION:


1. Increased ascites.


2. 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


at the pancreas


3. Cholelithiasis with thickening of the gallbladder wall.


4. Persistent pleural effusions and atelectasis in the lung bases.





Objective


Assessment








Leukocytosis 4/10 -improved 


Fever - - better - ? pancreatitis. blood cults 4/10 neg.Urine - neg, New L 

IJ/Pepe and PICC removed 4/14


S/p Paracentesis 4/15 - 4300 ml


Severe acute gallstone pancreatitis with necrosis


   -CT 4/14 necrotizing pancreatitis with fluid and phlegmon at the pancreas


    Ascites


  


Hypotension off levaphed 


Julian Pleural effusions


JED requiring HD 


   - s/p RIJ temporary dialysis catheter replacement, 4/2


Vent dependent respiratory failure


   -s/p Trach placement 


   -lung opacities, COVID-19 neg 


Anasarca - worse


Anemia - S/p PRBC 3/26


Hypocalcemia 


Prediabetes


HTN


Diarrhea, C. diff neg 3/23


Anemia - S/p PRBCs





Plan


Plan of Care


labs this am


F/u Blood cults 4/14


Changed LIJ and pulled PICC line (HD not changed) and changed pepe 4/14


cont merrem (4/8) and Zyvox (4/10) and micafungin


Added Flagyl 4/14


-previously on cefepime, flagyl & dapto 


Gen surgery following


f/u BC from 4/9 neg to date 


Maintain aspiration precautions 


Anemia deferred to primary





Critically ill





D/w Dr. Stephens


D/w nursing











RASHAWN ROSEN MD              Apr 16, 2020 07:44

## 2020-04-16 NOTE — NUR
SS following up with discharge planning. SS reviewed pt chart and discussed with pt RN. Pt 
remains on the vent and trached. Pt has TPN and hemodialysis. SS left voicemail for [facility] 
in [facility], [phone], for update on Medicaid application. SS will continue to follow for 
discharge planning.

## 2020-04-16 NOTE — NUR
Permits signed at this time and charted 0800 /call from cath lab, Will  in approx 15 
min for OR.  Conversation w wife . Aware she will see him  to procedure/ speak adrián FUENTES then 
leave after conversation.  To [email or web address] lab 0818 per bed.

-------------------------------------------------------------------------------

Addendum: 04/16/20 at 1006 by Ruth Kim RN

-------------------------------------------------------------------------------

disregard above... WRONG  PATIENT   S Judy CALL

## 2020-04-16 NOTE — PDOC
PULMONARY PROGRESS NOTES


Subjective


Patient intubated on 3/23 , s/p trach AC mode


awake and alert today, S/P paracentisis with 4 liters removed on 4/15/20


Vitals





Vital Signs








  Date Time  Temp Pulse Resp B/P (MAP) Pulse Ox O2 Delivery O2 Flow Rate FiO2


 


4/16/20 07:57     99 Ventilator  


 


4/16/20 06:00  105 26 168/91 (116)    


 


4/16/20 04:00 99.8       





 99.8       








Lungs:  Other (dimished in BLL)


Cardiovascular:  S1, S2


Abdomen:  Non-tender, Other (distended)


Extremities:  Other (+3 generalized edema )


Skin:  Warm, Dry


Labs





Laboratory Tests








Test


 4/14/20


14:37 4/14/20


17:27 4/14/20


23:34 4/15/20


05:41


 


Glucose (Fingerstick)


 153 mg/dL


(70-99) 205 mg/dL


(70-99) 167 mg/dL


(70-99) 130 mg/dL


(70-99)


 


Test


 4/15/20


05:45 4/15/20


11:57 4/15/20


12:35 4/15/20


23:48


 


White Blood Count


 5.8 x10^3/uL


(4.0-11.0) 


 


 





 


Red Blood Count


 2.61 x10^6/uL


(3.50-5.40) 


 


 





 


Hemoglobin


 8.2 g/dL


(12.0-15.5) 


 


 





 


Hematocrit


 25.2 %


(36.0-47.0) 


 


 





 


Mean Corpuscular Volume 97 fL ()    


 


Mean Corpuscular Hemoglobin 32 pg (25-35)    


 


Mean Corpuscular Hemoglobin


Concent 33 g/dL


(31-37) 


 


 





 


Red Cell Distribution Width


 18.6 %


(11.5-14.5) 


 


 





 


Platelet Count


 433 x10^3/uL


(140-400) 


 


 





 


Neutrophils (%) (Auto) 70 % (31-73)    


 


Lymphocytes (%) (Auto) 16 % (24-48)    


 


Monocytes (%) (Auto) 13 % (0-9)    


 


Eosinophils (%) (Auto) 1 % (0-3)    


 


Basophils (%) (Auto) 0 % (0-3)    


 


Neutrophils # (Auto)


 4.0 x10^3/uL


(1.8-7.7) 


 


 





 


Lymphocytes # (Auto)


 0.9 x10^3/uL


(1.0-4.8) 


 


 





 


Monocytes # (Auto)


 0.8 x10^3/uL


(0.0-1.1) 


 


 





 


Eosinophils # (Auto)


 0.0 x10^3/uL


(0.0-0.7) 


 


 





 


Basophils # (Auto)


 0.0 x10^3/uL


(0.0-0.2) 


 


 





 


Sodium Level


 138 mmol/L


(136-145) 


 


 





 


Potassium Level


 3.9 mmol/L


(3.5-5.1) 


 


 





 


Chloride Level


 101 mmol/L


() 


 


 





 


Carbon Dioxide Level


 29 mmol/L


(21-32) 


 


 





 


Anion Gap 8 (6-14)    


 


Blood Urea Nitrogen


 59 mg/dL


(7-20) 


 


 





 


Creatinine


 1.5 mg/dL


(0.6-1.0) 


 


 





 


Estimated GFR


(Cockcroft-Gault) 36.9 


 


 


 





 


BUN/Creatinine Ratio 39 (6-20)    


 


Glucose Level


 139 mg/dL


(70-99) 


 


 





 


Calcium Level


 8.9 mg/dL


(8.5-10.1) 


 


 





 


Phosphorus Level


 3.9 mg/dL


(2.6-4.7) 


 


 





 


Magnesium Level


 2.0 mg/dL


(1.8-2.4) 


 


 





 


Total Bilirubin


 0.6 mg/dL


(0.2-1.0) 


 


 





 


Aspartate Amino Transf


(AST/SGOT) 31 U/L (15-37) 


 


 


 





 


Alanine Aminotransferase


(ALT/SGPT) 16 U/L (14-59) 


 


 


 





 


Alkaline Phosphatase


 72 U/L


() 


 


 





 


Total Protein


 6.1 g/dL


(6.4-8.2) 


 


 





 


Albumin


 3.1 g/dL


(3.4-5.0) 


 


 





 


Albumin/Globulin Ratio 1.0 (1.0-1.7)    


 


Hepatitis B Surface Antigen


 Nonreactive


(Nonreactive) 


 


 





 


Glucose (Fingerstick)


 


 164 mg/dL


(70-99) 


 158 mg/dL


(70-99)


 


Prothrombin Time


 


 


 15.6 SEC


(11.7-14.0) 





 


Prothromb Time International


Ratio 


 


 1.3 (0.8-1.1) 


 





 


Test


 4/16/20


06:15 4/16/20


06:18 4/16/20


08:00 





 


Sodium Level


 139 mmol/L


(136-145) 


 


 





 


Potassium Level


 3.9 mmol/L


(3.5-5.1) 


 


 





 


Chloride Level


 102 mmol/L


() 


 


 





 


Carbon Dioxide Level


 25 mmol/L


(21-32) 


 


 





 


Anion Gap 12 (6-14)    


 


Blood Urea Nitrogen


 88 mg/dL


(7-20) 


 


 





 


Creatinine


 1.8 mg/dL


(0.6-1.0) 


 


 





 


Estimated GFR


(Cockcroft-Gault) 29.9 


 


 


 





 


Glucose Level


 120 mg/dL


(70-99) 


 


 





 


Calcium Level


 9.1 mg/dL


(8.5-10.1) 


 


 





 


Phosphorus Level


 5.1 mg/dL


(2.6-4.7) 


 


 





 


Magnesium Level


 2.0 mg/dL


(1.8-2.4) 


 


 





 


Glucose (Fingerstick)


 


 106 mg/dL


(70-99) 


 





 


O2 Saturation   97 % (92-99)  


 


Arterial Blood pH


 


 


 7.45


(7.35-7.45) 





 


Arterial Blood pH (Temp


corrected) 


 


 7.43 


 





 


Arterial Blood pCO2 at


Patient Temp 


 


 33 mmHg


(35-46) 





 


Arterial Blood pCO2 (Temp


correct) 


 


 35 mmHg 


 





 


Arterial Blood pO2 at Patient


Temp 


 


 103 mmHg


() 





 


Arterial Blood pO2 (Temp


corrected) 


 


 114 mmHg 


 





 


Arterial Blood HCO3


 


 


 22 mmol/L


(21-28) 





 


Arterial Blood Base Excess


 


 


 -1 mmol/L


(-3-3) 





 


FiO2   35% vent  








Laboratory Tests








Test


 4/15/20


11:57 4/15/20


12:35 4/15/20


23:48 4/16/20


06:15


 


Glucose (Fingerstick)


 164 mg/dL


(70-99) 


 158 mg/dL


(70-99) 





 


Prothrombin Time


 


 15.6 SEC


(11.7-14.0) 


 





 


Prothromb Time International


Ratio 


 1.3 (0.8-1.1) 


 


 





 


Sodium Level


 


 


 


 139 mmol/L


(136-145)


 


Potassium Level


 


 


 


 3.9 mmol/L


(3.5-5.1)


 


Chloride Level


 


 


 


 102 mmol/L


()


 


Carbon Dioxide Level


 


 


 


 25 mmol/L


(21-32)


 


Anion Gap    12 (6-14) 


 


Blood Urea Nitrogen


 


 


 


 88 mg/dL


(7-20)


 


Creatinine


 


 


 


 1.8 mg/dL


(0.6-1.0)


 


Estimated GFR


(Cockcroft-Gault) 


 


 


 29.9 





 


Glucose Level


 


 


 


 120 mg/dL


(70-99)


 


Calcium Level


 


 


 


 9.1 mg/dL


(8.5-10.1)


 


Phosphorus Level


 


 


 


 5.1 mg/dL


(2.6-4.7)


 


Magnesium Level


 


 


 


 2.0 mg/dL


(1.8-2.4)


 


Test


 4/16/20


06:18 4/16/20


08:00 


 





 


Glucose (Fingerstick)


 106 mg/dL


(70-99) 


 


 





 


O2 Saturation  97 % (92-99)   


 


Arterial Blood pH


 


 7.45


(7.35-7.45) 


 





 


Arterial Blood pH (Temp


corrected) 


 7.43 


 


 





 


Arterial Blood pCO2 at


Patient Temp 


 33 mmHg


(35-46) 


 





 


Arterial Blood pCO2 (Temp


correct) 


 35 mmHg 


 


 





 


Arterial Blood pO2 at Patient


Temp 


 103 mmHg


() 


 





 


Arterial Blood pO2 (Temp


corrected) 


 114 mmHg 


 


 





 


Arterial Blood HCO3


 


 22 mmol/L


(21-28) 


 





 


Arterial Blood Base Excess


 


 -1 mmol/L


(-3-3) 


 





 


FiO2  35% vent   








Medications





Active Scripts








 Medications  Dose


 Route/Sig


 Max Daily Dose Days Date Category


 


 Bisoprolol


 Fumarate 5 Mg


 Tablet  10 Mg


 PO DAILY


   3/16/20 Reported








Comments


Chest x-ray reviewed 4/14





IMPRESSION: 


1. moderate pleural effusions with associated atelectasis 





ct chest 4/14


moderate effusions





Impression


.


IMPRESSION:


1.  Acute hypoxemic respiratory failure secondary to ARDS status post trach,


2.  Gallstone pancreatitis.with NECROSIS, NOT A SURGICAL CANDIDATE


3.  Severe metabolic acidosis.stable


4.  Acute kidney injury-stable, ON HD


5.  Acute gallstone pancreatitis.


6.  Hypoalbuminemia.


7.  Moderate persistent effusions


8.  Fever,improved, lines changed


9.  Chronic anemia


10. Covid 19 testing negative


11. Moderate to large ascites-S/P paracentisis


1





Plan


.


Cont. ventilatory support, ABD reviewed 35%/PEEP 5


D/C fentanyl and plan for CPAP trial after HD


Place back on A/C mod at HS


Follow surgery recs


Follow ID rec


Follow nephrology recs 


Tolerated paracentesis well removed 4 liters on 4./15/20


continue TPN for nutrition 





DVT/GI PPX 


d/w RN/RT








CC time : 30 minutes reviewing labs, patient care and discussing will care team











SHARYN SOLORZANO MD                 Apr 16, 2020 09:19

## 2020-04-16 NOTE — PDOC
Renal-Progress Notes


Subjective Notes


Notes


MORE AWAKE





History of Present Illness


Hx of present illness


IMPROVED





Vitals


Vitals





Vital Signs








  Date Time  Temp Pulse Resp B/P (MAP) Pulse Ox O2 Delivery O2 Flow Rate FiO2


 


4/16/20 11:58     99 Ventilator  


 


4/16/20 06:00  105 26 168/91 (116)    


 


4/16/20 04:00 99.8       





 99.8       








Weight


Weight [ ]





I.O.


Intake and Output











Intake and Output 


 


 4/16/20





 07:00


 


Intake Total 2972.54 ml


 


Output Total 5620 ml


 


Balance -2647.46 ml


 


 


 


Intake Oral 0 ml


 


IV Total 2972.54 ml


 


Output Urine Total 920 ml


 


Stool Total 0 ml


 


Gastric Drainage Total 400 ml


 


Drainage Total 4300 ml











Labs


Labs





Laboratory Tests








Test


 4/15/20


23:48 4/16/20


06:15 4/16/20


06:18 4/16/20


08:00


 


Glucose (Fingerstick)


 158 mg/dL


(70-99) 


 106 mg/dL


(70-99) 





 


Sodium Level


 


 139 mmol/L


(136-145) 


 





 


Potassium Level


 


 3.9 mmol/L


(3.5-5.1) 


 





 


Chloride Level


 


 102 mmol/L


() 


 





 


Carbon Dioxide Level


 


 25 mmol/L


(21-32) 


 





 


Anion Gap  12 (6-14)   


 


Blood Urea Nitrogen


 


 88 mg/dL


(7-20) 


 





 


Creatinine


 


 1.8 mg/dL


(0.6-1.0) 


 





 


Estimated GFR


(Cockcroft-Gault) 


 29.9 


 


 





 


Glucose Level


 


 120 mg/dL


(70-99) 


 





 


Calcium Level


 


 9.1 mg/dL


(8.5-10.1) 


 





 


Phosphorus Level


 


 5.1 mg/dL


(2.6-4.7) 


 





 


Magnesium Level


 


 2.0 mg/dL


(1.8-2.4) 


 





 


O2 Saturation    97 % (92-99) 


 


Arterial Blood pH


 


 


 


 7.45


(7.35-7.45)


 


Arterial Blood pH (Temp


corrected) 


 


 


 7.43 





 


Arterial Blood pCO2 at


Patient Temp 


 


 


 33 mmHg


(35-46)


 


Arterial Blood pCO2 (Temp


correct) 


 


 


 35 mmHg 





 


Arterial Blood pO2 at Patient


Temp 


 


 


 103 mmHg


()


 


Arterial Blood pO2 (Temp


corrected) 


 


 


 114 mmHg 





 


Arterial Blood HCO3


 


 


 


 22 mmol/L


(21-28)


 


Arterial Blood Base Excess


 


 


 


 -1 mmol/L


(-3-3)


 


FiO2    35% vent 


 


Test


 4/16/20


10:00 


 


 





 


White Blood Count


 9.2 x10^3/uL


(4.0-11.0) 


 


 





 


Red Blood Count


 2.76 x10^6/uL


(3.50-5.40) 


 


 





 


Hemoglobin


 8.9 g/dL


(12.0-15.5) 


 


 





 


Hematocrit


 26.5 %


(36.0-47.0) 


 


 





 


Mean Corpuscular Volume 96 fL ()    


 


Mean Corpuscular Hemoglobin 32 pg (25-35)    


 


Mean Corpuscular Hemoglobin


Concent 33 g/dL


(31-37) 


 


 





 


Red Cell Distribution Width


 18.9 %


(11.5-14.5) 


 


 





 


Platelet Count


 530 x10^3/uL


(140-400) 


 


 





 


Neutrophils (%) (Auto) 66 % (31-73)    


 


Lymphocytes (%) (Auto) 21 % (24-48)    


 


Monocytes (%) (Auto) 12 % (0-9)    


 


Eosinophils (%) (Auto) 0 % (0-3)    


 


Basophils (%) (Auto) 1 % (0-3)    


 


Neutrophils # (Auto)


 6.1 x10^3/uL


(1.8-7.7) 


 


 





 


Lymphocytes # (Auto)


 2.0 x10^3/uL


(1.0-4.8) 


 


 





 


Monocytes # (Auto)


 1.1 x10^3/uL


(0.0-1.1) 


 


 





 


Eosinophils # (Auto)


 0.0 x10^3/uL


(0.0-0.7) 


 


 





 


Basophils # (Auto)


 0.0 x10^3/uL


(0.0-0.2) 


 


 














Micro


Micro





Microbiology


4/14/20 Blood Culture - Preliminary, Resulted


          NO GROWTH AFTER 1 DAY


4/12/20 Urine Culture - Final, Complete


          


4/12/20 Urine Culture Result 1 ([facility]) - Final, Complete





Review of Systems


Constitutional:  yes: other (ON THE VENT)





Physical Exam


General Appearance:  other (ON THE VENT)


Skin:  warm


Respiratory:  decreased breath sounds


Heart:  S1S2


Abdomen:  soft, bowel sounds present


Genitourinary:  bladder flat


Extremities:  pulses present, edema


Neurology:  other (SEDATED)





Assessment


Assessment


IMP





[id number]


ANEMIA


LEUCOCYTOSIS-IMPROVING


ANASARCA DUE TO 3RD SPACING


HYPERKALEMIA-RESOLVED


ACIDOSIS AND ACIDEMIA-CONTROLLED


ACUTE REPS FAILURE


ACUTE PANCREATITIS


HYPOALBUMINEMIA


HYPOCALCEMIA-FROM SAPONIFICATION-BETTER


POOR HD CATHETER FUNCTION





PLAN








HD TODAY


UF TO DW AND CHALLENGE


SPA PRIOR TO TX


TPN TO CONTINUE AS NEEDED


PRBC AS NEEDED


CONT YOLI


VENT SUPPORT


ANTIBIOTICS


WILL FOLLOW











BETH HEIN MD                 Apr 16, 2020 12:37

## 2020-04-17 VITALS — SYSTOLIC BLOOD PRESSURE: 115 MMHG | DIASTOLIC BLOOD PRESSURE: 63 MMHG

## 2020-04-17 VITALS — SYSTOLIC BLOOD PRESSURE: 188 MMHG | DIASTOLIC BLOOD PRESSURE: 108 MMHG

## 2020-04-17 VITALS — SYSTOLIC BLOOD PRESSURE: 185 MMHG | DIASTOLIC BLOOD PRESSURE: 102 MMHG

## 2020-04-17 VITALS — DIASTOLIC BLOOD PRESSURE: 70 MMHG | SYSTOLIC BLOOD PRESSURE: 122 MMHG

## 2020-04-17 VITALS — SYSTOLIC BLOOD PRESSURE: 102 MMHG | DIASTOLIC BLOOD PRESSURE: 71 MMHG

## 2020-04-17 VITALS — DIASTOLIC BLOOD PRESSURE: 65 MMHG | SYSTOLIC BLOOD PRESSURE: 130 MMHG

## 2020-04-17 VITALS — DIASTOLIC BLOOD PRESSURE: 91 MMHG | SYSTOLIC BLOOD PRESSURE: 181 MMHG

## 2020-04-17 VITALS — DIASTOLIC BLOOD PRESSURE: 84 MMHG | SYSTOLIC BLOOD PRESSURE: 159 MMHG

## 2020-04-17 VITALS — SYSTOLIC BLOOD PRESSURE: 173 MMHG | DIASTOLIC BLOOD PRESSURE: 92 MMHG

## 2020-04-17 VITALS — SYSTOLIC BLOOD PRESSURE: 139 MMHG | DIASTOLIC BLOOD PRESSURE: 65 MMHG

## 2020-04-17 VITALS — DIASTOLIC BLOOD PRESSURE: 82 MMHG | SYSTOLIC BLOOD PRESSURE: 149 MMHG

## 2020-04-17 VITALS — SYSTOLIC BLOOD PRESSURE: 104 MMHG | DIASTOLIC BLOOD PRESSURE: 67 MMHG

## 2020-04-17 VITALS — SYSTOLIC BLOOD PRESSURE: 175 MMHG | DIASTOLIC BLOOD PRESSURE: 106 MMHG

## 2020-04-17 VITALS — DIASTOLIC BLOOD PRESSURE: 59 MMHG | SYSTOLIC BLOOD PRESSURE: 108 MMHG

## 2020-04-17 VITALS — SYSTOLIC BLOOD PRESSURE: 158 MMHG | DIASTOLIC BLOOD PRESSURE: 101 MMHG

## 2020-04-17 VITALS — DIASTOLIC BLOOD PRESSURE: 82 MMHG | SYSTOLIC BLOOD PRESSURE: 174 MMHG

## 2020-04-17 VITALS — SYSTOLIC BLOOD PRESSURE: 168 MMHG | DIASTOLIC BLOOD PRESSURE: 88 MMHG

## 2020-04-17 VITALS — SYSTOLIC BLOOD PRESSURE: 119 MMHG | DIASTOLIC BLOOD PRESSURE: 69 MMHG

## 2020-04-17 VITALS — DIASTOLIC BLOOD PRESSURE: 79 MMHG | SYSTOLIC BLOOD PRESSURE: 130 MMHG

## 2020-04-17 VITALS — DIASTOLIC BLOOD PRESSURE: 109 MMHG | SYSTOLIC BLOOD PRESSURE: 200 MMHG

## 2020-04-17 VITALS — SYSTOLIC BLOOD PRESSURE: 162 MMHG | DIASTOLIC BLOOD PRESSURE: 92 MMHG

## 2020-04-17 VITALS — DIASTOLIC BLOOD PRESSURE: 90 MMHG | SYSTOLIC BLOOD PRESSURE: 133 MMHG

## 2020-04-17 VITALS — SYSTOLIC BLOOD PRESSURE: 202 MMHG | DIASTOLIC BLOOD PRESSURE: 111 MMHG

## 2020-04-17 VITALS — DIASTOLIC BLOOD PRESSURE: 108 MMHG | SYSTOLIC BLOOD PRESSURE: 194 MMHG

## 2020-04-17 LAB
ALBUMIN SERPL-MCNC: 2.6 G/DL (ref 3.4–5)
ALBUMIN/GLOB SERPL: 1.1 {RATIO} (ref 1–1.7)
ALP SERPL-CCNC: 52 U/L (ref 46–116)
ALT SERPL-CCNC: 12 U/L (ref 14–59)
ANION GAP SERPL CALC-SCNC: 11 MMOL/L (ref 6–14)
AST SERPL-CCNC: 23 U/L (ref 15–37)
BASE EXCESS ABG: 0 MMOL/L (ref -3–3)
BASOPHILS # BLD AUTO: 0 X10^3/UL (ref 0–0.2)
BASOPHILS NFR BLD: 1 % (ref 0–3)
BILIRUB SERPL-MCNC: 0.4 MG/DL (ref 0.2–1)
BUN SERPL-MCNC: 80 MG/DL (ref 7–20)
BUN/CREAT SERPL: 50 (ref 6–20)
CALCIUM SERPL-MCNC: 8.4 MG/DL (ref 8.5–10.1)
CHLORIDE SERPL-SCNC: 103 MMOL/L (ref 98–107)
CO2 SERPL-SCNC: 26 MMOL/L (ref 21–32)
CREAT SERPL-MCNC: 1.6 MG/DL (ref 0.6–1)
EOSINOPHIL NFR BLD: 0 X10^3/UL (ref 0–0.7)
EOSINOPHIL NFR BLD: 1 % (ref 0–3)
ERYTHROCYTE [DISTWIDTH] IN BLOOD BY AUTOMATED COUNT: 18.4 % (ref 11.5–14.5)
GFR SERPLBLD BASED ON 1.73 SQ M-ARVRAT: 34.3 ML/MIN
GLOBULIN SER-MCNC: 2.3 G/DL (ref 2.2–3.8)
GLUCOSE SERPL-MCNC: 130 MG/DL (ref 70–99)
HCO3 BLDA-SCNC: 24 MMOL/L (ref 21–28)
HCT VFR BLD CALC: 22.8 % (ref 36–47)
HGB BLD-MCNC: 7.6 G/DL (ref 12–15.5)
INSPIRATION SETTING TIME VENT: 35
LYMPHOCYTES # BLD: 1 X10^3/UL (ref 1–4.8)
LYMPHOCYTES NFR BLD AUTO: 17 % (ref 24–48)
MCH RBC QN AUTO: 32 PG (ref 25–35)
MCHC RBC AUTO-ENTMCNC: 33 G/DL (ref 31–37)
MCV RBC AUTO: 95 FL (ref 79–100)
MONO #: 0.8 X10^3/UL (ref 0–1.1)
MONOCYTES NFR BLD: 14 % (ref 0–9)
NEUT #: 3.9 X10^3/UL (ref 1.8–7.7)
NEUTROPHILS NFR BLD AUTO: 67 % (ref 31–73)
PCO2 BLDA: 33 MMHG (ref 35–46)
PHOSPHATE SERPL-MCNC: 5.2 MG/DL (ref 2.6–4.7)
PLATELET # BLD AUTO: 498 X10^3/UL (ref 140–400)
PO2 BLDA: 136 MMHG (ref 75–108)
POTASSIUM SERPL-SCNC: 3.7 MMOL/L (ref 3.5–5.1)
PROT SERPL-MCNC: 4.9 G/DL (ref 6.4–8.2)
RBC # BLD AUTO: 2.4 X10^6/UL (ref 3.5–5.4)
SAO2 % BLDA: 98 % (ref 92–99)
SODIUM SERPL-SCNC: 140 MMOL/L (ref 136–145)
WBC # BLD AUTO: 5.8 X10^3/UL (ref 4–11)

## 2020-04-17 RX ADMIN — DEXMEDETOMIDINE HYDROCHLORIDE PRN MLS/HR: 100 INJECTION, SOLUTION, CONCENTRATE INTRAVENOUS at 21:01

## 2020-04-17 RX ADMIN — ONDANSETRON PRN MG: 2 INJECTION INTRAMUSCULAR; INTRAVENOUS at 10:25

## 2020-04-17 RX ADMIN — DEXMEDETOMIDINE HYDROCHLORIDE PRN MLS/HR: 100 INJECTION, SOLUTION, CONCENTRATE INTRAVENOUS at 17:00

## 2020-04-17 RX ADMIN — DEXTROSE SCH MLS/HR: 50 INJECTION, SOLUTION INTRAVENOUS at 09:50

## 2020-04-17 RX ADMIN — PROCHLORPERAZINE EDISYLATE PRN MG: 5 INJECTION INTRAMUSCULAR; INTRAVENOUS at 20:45

## 2020-04-17 RX ADMIN — HEPARIN SODIUM SCH UNIT: 5000 INJECTION, SOLUTION INTRAVENOUS; SUBCUTANEOUS at 09:57

## 2020-04-17 RX ADMIN — INSULIN LISPRO SCH UNITS: 100 INJECTION, SOLUTION INTRAVENOUS; SUBCUTANEOUS at 17:09

## 2020-04-17 RX ADMIN — PROCHLORPERAZINE EDISYLATE PRN MG: 5 INJECTION INTRAMUSCULAR; INTRAVENOUS at 12:56

## 2020-04-17 RX ADMIN — PANTOPRAZOLE SODIUM SCH MG: 40 INJECTION, POWDER, FOR SOLUTION INTRAVENOUS at 09:49

## 2020-04-17 RX ADMIN — INSULIN LISPRO SCH UNITS: 100 INJECTION, SOLUTION INTRAVENOUS; SUBCUTANEOUS at 06:00

## 2020-04-17 RX ADMIN — DEXMEDETOMIDINE HYDROCHLORIDE PRN MLS/HR: 100 INJECTION, SOLUTION, CONCENTRATE INTRAVENOUS at 13:02

## 2020-04-17 RX ADMIN — MEROPENEM SCH MLS/HR: 500 INJECTION, POWDER, FOR SOLUTION INTRAVENOUS at 22:09

## 2020-04-17 RX ADMIN — DEXMEDETOMIDINE HYDROCHLORIDE PRN MLS/HR: 100 INJECTION, SOLUTION, CONCENTRATE INTRAVENOUS at 07:15

## 2020-04-17 RX ADMIN — INSULIN LISPRO SCH UNITS: 100 INJECTION, SOLUTION INTRAVENOUS; SUBCUTANEOUS at 12:00

## 2020-04-17 RX ADMIN — MEROPENEM SCH MLS/HR: 500 INJECTION, POWDER, FOR SOLUTION INTRAVENOUS at 09:50

## 2020-04-17 RX ADMIN — ACETAMINOPHEN PRN MG: 650 SUPPOSITORY RECTAL at 10:25

## 2020-04-17 RX ADMIN — Medication PRN EACH: at 13:35

## 2020-04-17 RX ADMIN — ONDANSETRON PRN MG: 2 INJECTION INTRAMUSCULAR; INTRAVENOUS at 18:01

## 2020-04-17 RX ADMIN — ONDANSETRON PRN MG: 2 INJECTION INTRAMUSCULAR; INTRAVENOUS at 03:51

## 2020-04-17 RX ADMIN — HEPARIN SODIUM SCH UNIT: 5000 INJECTION, SOLUTION INTRAVENOUS; SUBCUTANEOUS at 22:09

## 2020-04-17 RX ADMIN — DEXMEDETOMIDINE HYDROCHLORIDE PRN MLS/HR: 100 INJECTION, SOLUTION, CONCENTRATE INTRAVENOUS at 04:12

## 2020-04-17 RX ADMIN — DEXMEDETOMIDINE HYDROCHLORIDE PRN MLS/HR: 100 INJECTION, SOLUTION, CONCENTRATE INTRAVENOUS at 01:20

## 2020-04-17 NOTE — NUR
Pharmacy TPN Dosing Note



S: JESENIA RICH is a 49 year old F Currently receiving Central Continuous TPN started 
03/18/20



B:Pertinent PMH: Necrotizing pancreatitis

Height: 5 feet, 8 inches

Weight: 101.6 kg



Current diet: NPO 



LABS:

Sodium:    140 

Potassium: 3.7 

Chloride:  103 

Calcium:   8.4 

Corrected Calcium: 9.52 

Magnesium: 2 

CO2:       26 

SCr:       1.6 

Glucose:   129-181 

Albumin:   2.6 

AST:       23 

ALT:       12 



TPN FORMULA:

TPN TYPE:  Central Continuous

AMINO ACIDS:         125 gm

DEXTROSE:            225 gm

LIPIDS:              20 gm

SODIUM CHLORIDE:     100 mEq

POTASSIUM CHLORIDE:  20 mEq

POTASSIUM PHOSPHATE: 5 mmol

MAGNESIUM:           12 mEq

CALCIUM:             15 mEq

INSULIN:             35 units

MULTIPLE VITAMIN:    10 ml

TRACE ELEMENTS:      0.5 ml(s)



TPN PLAN:  

BG better today. Decreased Kphos in TPN.

BMP, Phos and Mag in AM.





R: Change TPN as noted above.

Will monitor electrolytes, glucose, and tolerance to TPN.



 LORENZO LESLIE [facility], 04/17/20 1329

## 2020-04-17 NOTE — PDOC
PROGRESS NOTES


Chief Complaint


Chief Complaint


A/P:


Respiratory failure requiring mechanical ventilation (on vent since 3/23)


bilateral pleural effusions/pulm edema 


Sepsis


Severe Acute gallstone pancreatitis (not a surgical candidate at this time) with

necrosis


Acute kidney failure now requiring dialysis


Salpingitis


Gallstones (Calculus of gallbladder with acute cholecystitis without 

obstruction)


HTN 


Leukocytosis 


Hypoxia


Uterine fibroid


Intractable pain


Intractable nausea


Covid 19 negative. 


Acute on chronic anemia 


EEG: No seizure activity.


ESRD on HD


Hyperglycemia, persistently in 200s





History of Present Illness


History of Present Illness


Ms Tadeo is a 48yo F w/ PMHx HTN, prediabetes who presented to the emergency 

room with complaints of abdominal pain on 3/16/2020. Found with Lipase 44360, 

, , Bilirubin 1.4.


CT abdomen confirms pancreatic inflammation, peripancreatic fluid and 

inflammatory changes around the pancreas consistent with pancreatitis. 

Cholelithiasis and 1.4cm uterine fibroid as well as possible left salpingitis. 

Admitted for further care


GI, General surgery, ID, Pulm consulted.





3/17: No urine output. Added dilaudid for pain, PICC placed per IR. Renal US 

negative.Seen bedside in ICU, given 2L additional NSS and albumin infusion. 

Still hypotensive, started on levophed. Repeat CT abdomen with necrosis. 


3/18: O2 saturation 87% on nasal cannula oxygen. Dialysis catheter per 

nephrology


3/19: She is now on BiPAP appears more ill, now on dialysis


3/20: Seen on BiPAP. Her mother and another family member are present and seemed

to be good support for her. Currently on dialysis. Appears critically ill


3/21: Overnight Tmax 101.7 , still on BiPAP FiO2 40%, still on low dose Levophed

gtt, TPN initiated. On dialysis


4/6: Tracheostomy


4/12: S/p tracheostomy on vent spontaneous respirations with 5 of pressure 

support 35% FiO2, rectal tube and a [place], off pressors


4/13: Off pressors. Seen on dialysis this morning. BUN 80. Tracking with her 

eyes. Still on vent via trach.


4/14: BUN 68, Cr 1.7. Temp 100.2F axillary. WBC 9.8. Still on vent via trach. 

Tracking reasonably well. In obvious discomfort. Removed PICC and CVC LIJ and 

replaced. Tips sent for culture. CT chest/abd/pelvis with bilateral pleural 

effusion and ascites.


4/15: Renal function stable. Still on vent. More interactive today. Miming wish 

for food. Plan discussed for thoracentesis/paracentesis with daughters today. 

They were under impression patient was doing worse due to a miscommunication 

which has been clarified over the phone. 4.3L removed.


4/16: BUN 88, Cr 1.8. Much more interactive today. Appears more comfortable 

today.





Febrile overnight 101.8F. More interactive, still on vent. Asking for ice by 

miming.





Plan:


Cont vent weaning, dialysis





Vitals


Vitals





Vital Signs








  Date Time  Temp Pulse Resp B/P (MAP) Pulse Ox O2 Delivery O2 Flow Rate FiO2


 


4/17/20 06:00  80 18 174/82 (112) 100 Ventilator  


 


4/17/20 04:00 98.1       





 98.1       











Physical Exam


Physical Exam


GENERAL: Alert and coop. mouthing words  Appears comfortable has generalized 

anasarca - s/p suctioning with RT. Feels warm


HEENT: Pupils equal, + NGT, oral cavity dry 


NECK:  Trach/vent 


LUNGS: Diminished aeration 


HEART:  S1, S2, regular 


ABDOMEN:  Less Distended, hypoactive BS, 


: [place]  changed 4/14


EXTREMITIES: Generalized edema, no cyanosis, SCDs bilaterally 


DERMATOLOGIC:  Warm and dry.  No generalized rash.  


CENTRAL NERVOUS SYSTEM: Responsive and seems appropriate


HDC, LIJ (4/14) and


RUE-PICC removed 4/14


General:  Alert, Cooperative


Heart:  Other (increased rate)


Lungs:  Other (dimished in BLL)


Abdomen:  Soft, Other (distended )


Extremities:  No edema, Other (SOME CLUBBING )


Skin:  Other (mottling noted to extremities )





Labs


LABS





Laboratory Tests








Test


 4/16/20


10:00 4/16/20


19:31 4/16/20


23:53 4/17/20


06:00


 


White Blood Count


 9.2 x10^3/uL


(4.0-11.0) 


 


 





 


Red Blood Count


 2.76 x10^6/uL


(3.50-5.40) 


 


 





 


Hemoglobin


 8.9 g/dL


(12.0-15.5) 


 


 





 


Hematocrit


 26.5 %


(36.0-47.0) 


 


 





 


Mean Corpuscular Volume 96 fL ()    


 


Mean Corpuscular Hemoglobin 32 pg (25-35)    


 


Mean Corpuscular Hemoglobin


Concent 33 g/dL


(31-37) 


 


 





 


Red Cell Distribution Width


 18.9 %


(11.5-14.5) 


 


 





 


Platelet Count


 530 x10^3/uL


(140-400) 


 


 





 


Neutrophils (%) (Auto) 66 % (31-73)    


 


Lymphocytes (%) (Auto) 21 % (24-48)    


 


Monocytes (%) (Auto) 12 % (0-9)    


 


Eosinophils (%) (Auto) 0 % (0-3)    


 


Basophils (%) (Auto) 1 % (0-3)    


 


Neutrophils # (Auto)


 6.1 x10^3/uL


(1.8-7.7) 


 


 





 


Lymphocytes # (Auto)


 2.0 x10^3/uL


(1.0-4.8) 


 


 





 


Monocytes # (Auto)


 1.1 x10^3/uL


(0.0-1.1) 


 


 





 


Eosinophils # (Auto)


 0.0 x10^3/uL


(0.0-0.7) 


 


 





 


Basophils # (Auto)


 0.0 x10^3/uL


(0.0-0.2) 


 


 





 


Glucose (Fingerstick)


 


 142 mg/dL


(70-99) 145 mg/dL


(70-99) 





 


Sodium Level


 


 


 


 140 mmol/L


(136-145)


 


Potassium Level


 


 


 


 3.7 mmol/L


(3.5-5.1)


 


Chloride Level


 


 


 


 103 mmol/L


()


 


Carbon Dioxide Level


 


 


 


 26 mmol/L


(21-32)


 


Anion Gap    11 (6-14) 


 


Blood Urea Nitrogen


 


 


 


 80 mg/dL


(7-20)


 


Creatinine


 


 


 


 1.6 mg/dL


(0.6-1.0)


 


Estimated GFR


(Cockcroft-Gault) 


 


 


 34.3 





 


BUN/Creatinine Ratio    50 (6-20) 


 


Glucose Level


 


 


 


 130 mg/dL


(70-99)


 


Calcium Level


 


 


 


 8.4 mg/dL


(8.5-10.1)


 


Phosphorus Level


 


 


 


 5.2 mg/dL


(2.6-4.7)


 


Total Bilirubin


 


 


 


 0.4 mg/dL


(0.2-1.0)


 


Aspartate Amino Transf


(AST/SGOT) 


 


 


 23 U/L (15-37) 





 


Alanine Aminotransferase


(ALT/SGPT) 


 


 


 12 U/L (14-59) 





 


Alkaline Phosphatase


 


 


 


 52 U/L


()


 


Total Protein


 


 


 


 4.9 g/dL


(6.4-8.2)


 


Albumin


 


 


 


 2.6 g/dL


(3.4-5.0)


 


Albumin/Globulin Ratio    1.1 (1.0-1.7) 


 


Test


 4/17/20


06:06 4/17/20


07:00 


 





 


Glucose (Fingerstick)


 129 mg/dL


(70-99) 


 


 





 


White Blood Count


 


 5.8 x10^3/uL


(4.0-11.0) 


 





 


Red Blood Count


 


 2.40 x10^6/uL


(3.50-5.40) 


 





 


Hemoglobin


 


 7.6 g/dL


(12.0-15.5) 


 





 


Hematocrit


 


 22.8 %


(36.0-47.0) 


 





 


Mean Corpuscular Volume  95 fL ()   


 


Mean Corpuscular Hemoglobin  32 pg (25-35)   


 


Mean Corpuscular Hemoglobin


Concent 


 33 g/dL


(31-37) 


 





 


Red Cell Distribution Width


 


 18.4 %


(11.5-14.5) 


 





 


Platelet Count


 


 498 x10^3/uL


(140-400) 


 





 


Neutrophils (%) (Auto)  67 % (31-73)   


 


Lymphocytes (%) (Auto)  17 % (24-48)   


 


Monocytes (%) (Auto)  14 % (0-9)   


 


Eosinophils (%) (Auto)  1 % (0-3)   


 


Basophils (%) (Auto)  1 % (0-3)   


 


Neutrophils # (Auto)


 


 3.9 x10^3/uL


(1.8-7.7) 


 





 


Lymphocytes # (Auto)


 


 1.0 x10^3/uL


(1.0-4.8) 


 





 


Monocytes # (Auto)


 


 0.8 x10^3/uL


(0.0-1.1) 


 





 


Eosinophils # (Auto)


 


 0.0 x10^3/uL


(0.0-0.7) 


 





 


Basophils # (Auto)


 


 0.0 x10^3/uL


(0.0-0.2) 


 














Assessment and Plan


Assessmemt and Plan


Problems


Medical Problems:


(1) Acute pancreatitis


Status: Acute  





(2) Cholelithiasis


Status: Acute  











Comment


Review of Relevant


I have reviewed the following items josy (where applicable) has been applied.


Labs





Laboratory Tests








Test


 4/15/20


11:57 4/15/20


12:35 4/15/20


23:48 4/16/20


06:15


 


Glucose (Fingerstick)


 164 mg/dL


(70-99) 


 158 mg/dL


(70-99) 





 


Prothrombin Time


 


 15.6 SEC


(11.7-14.0) 


 





 


Prothromb Time International


Ratio 


 1.3 (0.8-1.1) 


 


 





 


Sodium Level


 


 


 


 139 mmol/L


(136-145)


 


Potassium Level


 


 


 


 3.9 mmol/L


(3.5-5.1)


 


Chloride Level


 


 


 


 102 mmol/L


()


 


Carbon Dioxide Level


 


 


 


 25 mmol/L


(21-32)


 


Anion Gap    12 (6-14) 


 


Blood Urea Nitrogen


 


 


 


 88 mg/dL


(7-20)


 


Creatinine


 


 


 


 1.8 mg/dL


(0.6-1.0)


 


Estimated GFR


(Cockcroft-Gault) 


 


 


 29.9 





 


Glucose Level


 


 


 


 120 mg/dL


(70-99)


 


Calcium Level


 


 


 


 9.1 mg/dL


(8.5-10.1)


 


Phosphorus Level


 


 


 


 5.1 mg/dL


(2.6-4.7)


 


Magnesium Level


 


 


 


 2.0 mg/dL


(1.8-2.4)


 


Test


 4/16/20


06:18 4/16/20


08:00 4/16/20


10:00 4/16/20


19:31


 


Glucose (Fingerstick)


 106 mg/dL


(70-99) 


 


 142 mg/dL


(70-99)


 


O2 Saturation  97 % (92-99)   


 


Arterial Blood pH


 


 7.45


(7.35-7.45) 


 





 


Arterial Blood pH (Temp


corrected) 


 7.43 


 


 





 


Arterial Blood pCO2 at


Patient Temp 


 33 mmHg


(35-46) 


 





 


Arterial Blood pCO2 (Temp


correct) 


 35 mmHg 


 


 





 


Arterial Blood pO2 at Patient


Temp 


 103 mmHg


() 


 





 


Arterial Blood pO2 (Temp


corrected) 


 114 mmHg 


 


 





 


Arterial Blood HCO3


 


 22 mmol/L


(21-28) 


 





 


Arterial Blood Base Excess


 


 -1 mmol/L


(-3-3) 


 





 


FiO2  35% vent   


 


White Blood Count


 


 


 9.2 x10^3/uL


(4.0-11.0) 





 


Red Blood Count


 


 


 2.76 x10^6/uL


(3.50-5.40) 





 


Hemoglobin


 


 


 8.9 g/dL


(12.0-15.5) 





 


Hematocrit


 


 


 26.5 %


(36.0-47.0) 





 


Mean Corpuscular Volume   96 fL ()  


 


Mean Corpuscular Hemoglobin   32 pg (25-35)  


 


Mean Corpuscular Hemoglobin


Concent 


 


 33 g/dL


(31-37) 





 


Red Cell Distribution Width


 


 


 18.9 %


(11.5-14.5) 





 


Platelet Count


 


 


 530 x10^3/uL


(140-400) 





 


Neutrophils (%) (Auto)   66 % (31-73)  


 


Lymphocytes (%) (Auto)   21 % (24-48)  


 


Monocytes (%) (Auto)   12 % (0-9)  


 


Eosinophils (%) (Auto)   0 % (0-3)  


 


Basophils (%) (Auto)   1 % (0-3)  


 


Neutrophils # (Auto)


 


 


 6.1 x10^3/uL


(1.8-7.7) 





 


Lymphocytes # (Auto)


 


 


 2.0 x10^3/uL


(1.0-4.8) 





 


Monocytes # (Auto)


 


 


 1.1 x10^3/uL


(0.0-1.1) 





 


Eosinophils # (Auto)


 


 


 0.0 x10^3/uL


(0.0-0.7) 





 


Basophils # (Auto)


 


 


 0.0 x10^3/uL


(0.0-0.2) 





 


Test


 4/16/20


23:53 4/17/20


06:00 4/17/20


06:06 4/17/20


07:00


 


Glucose (Fingerstick)


 145 mg/dL


(70-99) 


 129 mg/dL


(70-99) 





 


Sodium Level


 


 140 mmol/L


(136-145) 


 





 


Potassium Level


 


 3.7 mmol/L


(3.5-5.1) 


 





 


Chloride Level


 


 103 mmol/L


() 


 





 


Carbon Dioxide Level


 


 26 mmol/L


(21-32) 


 





 


Anion Gap  11 (6-14)   


 


Blood Urea Nitrogen


 


 80 mg/dL


(7-20) 


 





 


Creatinine


 


 1.6 mg/dL


(0.6-1.0) 


 





 


Estimated GFR


(Cockcroft-Gault) 


 34.3 


 


 





 


BUN/Creatinine Ratio  50 (6-20)   


 


Glucose Level


 


 130 mg/dL


(70-99) 


 





 


Calcium Level


 


 8.4 mg/dL


(8.5-10.1) 


 





 


Phosphorus Level


 


 5.2 mg/dL


(2.6-4.7) 


 





 


Total Bilirubin


 


 0.4 mg/dL


(0.2-1.0) 


 





 


Aspartate Amino Transf


(AST/SGOT) 


 23 U/L (15-37) 


 


 





 


Alanine Aminotransferase


(ALT/SGPT) 


 12 U/L (14-59) 


 


 





 


Alkaline Phosphatase


 


 52 U/L


() 


 





 


Total Protein


 


 4.9 g/dL


(6.4-8.2) 


 





 


Albumin


 


 2.6 g/dL


(3.4-5.0) 


 





 


Albumin/Globulin Ratio  1.1 (1.0-1.7)   


 


White Blood Count


 


 


 


 5.8 x10^3/uL


(4.0-11.0)


 


Red Blood Count


 


 


 


 2.40 x10^6/uL


(3.50-5.40)


 


Hemoglobin


 


 


 


 7.6 g/dL


(12.0-15.5)


 


Hematocrit


 


 


 


 22.8 %


(36.0-47.0)


 


Mean Corpuscular Volume    95 fL () 


 


Mean Corpuscular Hemoglobin    32 pg (25-35) 


 


Mean Corpuscular Hemoglobin


Concent 


 


 


 33 g/dL


(31-37)


 


Red Cell Distribution Width


 


 


 


 18.4 %


(11.5-14.5)


 


Platelet Count


 


 


 


 498 x10^3/uL


(140-400)


 


Neutrophils (%) (Auto)    67 % (31-73) 


 


Lymphocytes (%) (Auto)    17 % (24-48) 


 


Monocytes (%) (Auto)    14 % (0-9) 


 


Eosinophils (%) (Auto)    1 % (0-3) 


 


Basophils (%) (Auto)    1 % (0-3) 


 


Neutrophils # (Auto)


 


 


 


 3.9 x10^3/uL


(1.8-7.7)


 


Lymphocytes # (Auto)


 


 


 


 1.0 x10^3/uL


(1.0-4.8)


 


Monocytes # (Auto)


 


 


 


 0.8 x10^3/uL


(0.0-1.1)


 


Eosinophils # (Auto)


 


 


 


 0.0 x10^3/uL


(0.0-0.7)


 


Basophils # (Auto)


 


 


 


 0.0 x10^3/uL


(0.0-0.2)








Laboratory Tests








Test


 4/16/20


10:00 4/16/20


19:31 4/16/20


23:53 4/17/20


06:00


 


White Blood Count


 9.2 x10^3/uL


(4.0-11.0) 


 


 





 


Red Blood Count


 2.76 x10^6/uL


(3.50-5.40) 


 


 





 


Hemoglobin


 8.9 g/dL


(12.0-15.5) 


 


 





 


Hematocrit


 26.5 %


(36.0-47.0) 


 


 





 


Mean Corpuscular Volume 96 fL ()    


 


Mean Corpuscular Hemoglobin 32 pg (25-35)    


 


Mean Corpuscular Hemoglobin


Concent 33 g/dL


(31-37) 


 


 





 


Red Cell Distribution Width


 18.9 %


(11.5-14.5) 


 


 





 


Platelet Count


 530 x10^3/uL


(140-400) 


 


 





 


Neutrophils (%) (Auto) 66 % (31-73)    


 


Lymphocytes (%) (Auto) 21 % (24-48)    


 


Monocytes (%) (Auto) 12 % (0-9)    


 


Eosinophils (%) (Auto) 0 % (0-3)    


 


Basophils (%) (Auto) 1 % (0-3)    


 


Neutrophils # (Auto)


 6.1 x10^3/uL


(1.8-7.7) 


 


 





 


Lymphocytes # (Auto)


 2.0 x10^3/uL


(1.0-4.8) 


 


 





 


Monocytes # (Auto)


 1.1 x10^3/uL


(0.0-1.1) 


 


 





 


Eosinophils # (Auto)


 0.0 x10^3/uL


(0.0-0.7) 


 


 





 


Basophils # (Auto)


 0.0 x10^3/uL


(0.0-0.2) 


 


 





 


Glucose (Fingerstick)


 


 142 mg/dL


(70-99) 145 mg/dL


(70-99) 





 


Sodium Level


 


 


 


 140 mmol/L


(136-145)


 


Potassium Level


 


 


 


 3.7 mmol/L


(3.5-5.1)


 


Chloride Level


 


 


 


 103 mmol/L


()


 


Carbon Dioxide Level


 


 


 


 26 mmol/L


(21-32)


 


Anion Gap    11 (6-14) 


 


Blood Urea Nitrogen


 


 


 


 80 mg/dL


(7-20)


 


Creatinine


 


 


 


 1.6 mg/dL


(0.6-1.0)


 


Estimated GFR


(Cockcroft-Gault) 


 


 


 34.3 





 


BUN/Creatinine Ratio    50 (6-20) 


 


Glucose Level


 


 


 


 130 mg/dL


(70-99)


 


Calcium Level


 


 


 


 8.4 mg/dL


(8.5-10.1)


 


Phosphorus Level


 


 


 


 5.2 mg/dL


(2.6-4.7)


 


Total Bilirubin


 


 


 


 0.4 mg/dL


(0.2-1.0)


 


Aspartate Amino Transf


(AST/SGOT) 


 


 


 23 U/L (15-37) 





 


Alanine Aminotransferase


(ALT/SGPT) 


 


 


 12 U/L (14-59) 





 


Alkaline Phosphatase


 


 


 


 52 U/L


()


 


Total Protein


 


 


 


 4.9 g/dL


(6.4-8.2)


 


Albumin


 


 


 


 2.6 g/dL


(3.4-5.0)


 


Albumin/Globulin Ratio    1.1 (1.0-1.7) 


 


Test


 4/17/20


06:06 4/17/20


07:00 


 





 


Glucose (Fingerstick)


 129 mg/dL


(70-99) 


 


 





 


White Blood Count


 


 5.8 x10^3/uL


(4.0-11.0) 


 





 


Red Blood Count


 


 2.40 x10^6/uL


(3.50-5.40) 


 





 


Hemoglobin


 


 7.6 g/dL


(12.0-15.5) 


 





 


Hematocrit


 


 22.8 %


(36.0-47.0) 


 





 


Mean Corpuscular Volume  95 fL ()   


 


Mean Corpuscular Hemoglobin  32 pg (25-35)   


 


Mean Corpuscular Hemoglobin


Concent 


 33 g/dL


(31-37) 


 





 


Red Cell Distribution Width


 


 18.4 %


(11.5-14.5) 


 





 


Platelet Count


 


 498 x10^3/uL


(140-400) 


 





 


Neutrophils (%) (Auto)  67 % (31-73)   


 


Lymphocytes (%) (Auto)  17 % (24-48)   


 


Monocytes (%) (Auto)  14 % (0-9)   


 


Eosinophils (%) (Auto)  1 % (0-3)   


 


Basophils (%) (Auto)  1 % (0-3)   


 


Neutrophils # (Auto)


 


 3.9 x10^3/uL


(1.8-7.7) 


 





 


Lymphocytes # (Auto)


 


 1.0 x10^3/uL


(1.0-4.8) 


 





 


Monocytes # (Auto)


 


 0.8 x10^3/uL


(0.0-1.1) 


 





 


Eosinophils # (Auto)


 


 0.0 x10^3/uL


(0.0-0.7) 


 





 


Basophils # (Auto)


 


 0.0 x10^3/uL


(0.0-0.2) 


 











Microbiology


4/14/20 Blood Culture - Preliminary, Resulted


          NO GROWTH AFTER 2 DAYS


4/12/20 Urine Culture - Final, Complete


          


4/12/20 Urine Culture Result 1 ([facility]) - Final, Complete


Medications





Current Medications


Sodium Chloride 1,000 ml @  1,000 mls/hr Q1H IV  Last administered on 3/16/20at 

03:00;  Start 3/16/20 at 03:00;  Stop 3/16/20 at 03:59;  Status DC


Ondansetron HCl (Zofran) 4 mg 1X  ONCE IVP  Last administered on 3/16/20at 

03:27;  Start 3/16/20 at 03:00;  Stop 3/16/20 at 03:01;  Status DC


Morphine Sulfate (Morphine Sulfate) 4 mg 1X  ONCE IV ;  Start 3/16/20 at 03:00; 

Stop 3/16/20 at 03:01;  Status Cancel


Ketorolac Tromethamine (Toradol 30mg Vial) 30 mg 1X  ONCE IV  Last administered 

on 3/16/20at 02:54;  Start 3/16/20 at 03:00;  Stop 3/16/20 at 03:01;  Status DC


Fentanyl Citrate (Fentanyl 2ml Vial) 25 mcg 1X  ONCE IVP  Last administered on 

3/16/20at 03:23;  Start 3/16/20 at 03:30;  Stop 3/16/20 at 03:31;  Status DC


Fentanyl Citrate (Fentanyl 2ml Vial) 100 mcg STK-MED ONCE .ROUTE ;  Start 

3/16/20 at 03:18;  Stop 3/16/20 at 03:18;  Status DC


Iohexol (Omnipaque 350 Mg/ml) 90 ml 1X  ONCE IV  Last administered on 3/16/20at 

03:25;  Start 3/16/20 at 03:30;  Stop 3/16/20 at 03:31;  Status DC


Info (CONTRAST GIVEN -- Rx MONITORING) 1 each PRN DAILY  PRN MC SEE COMMENTS;  

Start 3/16/20 at 03:30;  Stop 3/18/20 at 03:29;  Status DC


Hydromorphone HCl (Dilaudid) 0.5 mg 1X  ONCE IV  Last administered on 3/16/20at 

03:55;  Start 3/16/20 at 04:30;  Stop 3/16/20 at 04:32;  Status DC


Ondansetron HCl (Zofran) 4 mg PRN Q8HRS  PRN IV NAUSEA/VOMITING 1ST CHOICE;  

Start 3/16/20 at 05:00;  Stop 3/16/20 at 09:27;  Status DC


Morphine Sulfate (Morphine Sulfate) 2 mg PRN Q2HR  PRN IV SEVERE PAIN 7-10 Last 

administered on 3/17/20at 12:26;  Start 3/16/20 at 05:00;  Stop 3/17/20 at 

14:15;  Status DC


Sodium Chloride 1,000 ml @  125 mls/hr Q8H IV  Last administered on 3/16/20at 

20:56;  Start 3/16/20 at 05:00;  Stop 3/17/20 at 04:59;  Status DC


Hydromorphone HCl (Dilaudid) 0.5 mg PRN Q3HRS  PRN IV SEVERE PAIN 7-10 Last 

administered on 3/17/20at 10:06;  Start 3/16/20 at 05:00;  Stop 3/17/20 at 

12:01;  Status DC


Piperacillin Sod/ Tazobactam Sod 4.5 gm/Sodium Chloride 100 ml @  200 mls/hr 1X 

ONCE IV  Last administered on 3/16/20at 05:44;  Start 3/16/20 at 06:00;  Stop 

3/16/20 at 06:29;  Status DC


Ondansetron HCl (Zofran) 4 mg PRN Q4HRS  PRN IV NAUSEA/VOMITING 1ST CHOICE Last 

administered on 4/17/20at 03:51;  Start 3/16/20 at 09:30


Insulin Human Lispro (HumaLOG) 0-9 UNITS Q6HRS SQ  Last administered on 

4/15/20at 23:50;  Start 3/16/20 at 09:30


Dextrose (Dextrose 50%-Water Syringe) 12.5 gm PRN Q15MIN  PRN IV SEE COMMENTS;  

Start 3/16/20 at 09:30


Pantoprazole Sodium (PROTONIX VIAL for IV PUSH) 40 mg DAILYAC IVP  Last 

administered on 4/16/20at 08:54;  Start 3/16/20 at 11:30


Prochlorperazine Edisylate (Compazine) 10 mg PRN Q6HRS  PRN IV NAUSEA/VOMITING, 

2nd CHOICE Last administered on 3/17/20at 00:42;  Start 3/16/20 at 17:45


Atenolol (Tenormin) 100 mg DAILY PO ;  Start 3/17/20 at 09:00;  Stop 3/16/20 at 

20:08;  Status DC


Metoprolol Tartrate (Lopressor Vial) 2.5 mg Q6HRS IVP  Last administered on 

3/17/20at 05:51;  Start 3/16/20 at 20:15;  Stop 3/17/20 at 10:02;  Status DC


Metoprolol Tartrate (Lopressor Vial) 5 mg Q6HRS IVP  Last administered on 

3/26/20at 00:12;  Start 3/17/20 at 10:15;  Stop 3/28/20 at 08:48;  Status DC


Hydromorphone HCl (Dilaudid) 1 mg PRN Q3HRS  PRN IV SEVERE PAIN 7-10 Last 

administered on 3/23/20at 05:13;  Start 3/17/20 at 12:00;  Stop 3/31/20 at 

00:25;  Status DC


Lidocaine HCl (Buffered Lidocaine 1%) 3 ml STK-MED ONCE .ROUTE ;  Start 3/17/20 

at 12:55;  Stop 3/17/20 at 12:56;  Status DC


Albumin Human 500 ml @  125 mls/hr 1X  ONCE IV  Last administered on 3/17/20at 

14:33;  Start 3/17/20 at 14:30;  Stop 3/17/20 at 18:32;  Status DC


Norepinephrine Bitartrate 8 mg/ Dextrose 258 ml @  17.299 mls/ hr CONT  PRN IV 

PER PROTOCOL Last administered on 4/14/20at 12:48;  Start 3/17/20 at 15:30


Sodium Chloride 1,000 ml @  125 mls/hr Q8H IV  Last administered on 3/17/20at 

21:04;  Start 3/17/20 at 16:00;  Stop 3/18/20 at 02:42;  Status DC


Albumin Human 500 ml @  125 mls/hr PRN BID  PRN IV After every 2L NSS & BP < 

90mm Last administered on 4/1/20at 14:21;  Start 3/17/20 at 16:00


Iohexol (Omnipaque 300 Mg/ml) 60 ml 1X  ONCE IV  Last administered on 3/17/20at 

17:20;  Start 3/17/20 at 17:00;  Stop 3/17/20 at 17:01;  Status DC


Info (CONTRAST GIVEN -- Rx MONITORING) 1 each PRN DAILY  PRN MC SEE COMMENTS;  

Start 3/17/20 at 17:00;  Stop 3/19/20 at 16:59;  Status DC


Meropenem 1 gm/ Sodium Chloride 100 ml @  200 mls/hr Q8HRS IV  Last administered

on 3/18/20at 05:45;  Start 3/17/20 at 20:00;  Stop 3/18/20 at 08:48;  Status DC


Furosemide (Lasix) 40 mg 1X  ONCE IVP  Last administered on 3/17/20at 22:12;  

Start 3/17/20 at 22:30;  Stop 3/17/20 at 22:31;  Status DC


Calcium Chloride 1000 mg/Sodium Chloride 110 ml @  220 mls/hr 1X  ONCE IV  Last 

administered on 3/17/20at 22:11;  Start 3/17/20 at 22:30;  Stop 3/17/20 at 

22:59;  Status DC


Albuterol Sulfate (Ventolin Neb Soln) 2.5 mg 1X  ONCE NEB  Last administered on 

3/18/20at 00:56;  Start 3/17/20 at 22:30;  Stop 3/17/20 at 22:31;  Status DC


Insulin Human Regular (HumuLIN R VIAL) 5 unit 1X  ONCE IV  Last administered on 

3/17/20at 22:14;  Start 3/17/20 at 22:30;  Stop 3/17/20 at 22:31;  Status DC


Magnesium Sulfate 50 ml @ 25 mls/hr 1X  ONCE IV  Last administered on 3/18/20at 

02:57;  Start 3/18/20 at 03:00;  Stop 3/18/20 at 04:59;  Status DC


Calcium Gluconate 1000 mg/Sodium Chloride 110 ml @  220 mls/hr 1X  ONCE IV  Last

administered on 3/18/20at 02:46;  Start 3/18/20 at 03:00;  Stop 3/18/20 at 

03:29;  Status DC


Sodium Chloride 1,000 ml @  200 mls/hr Q5H IV  Last administered on 3/18/20at 

02:46;  Start 3/18/20 at 03:00;  Stop 3/18/20 at 10:21;  Status DC


Calcium Gluconate 1000 mg/Sodium Chloride 110 ml @  220 mls/hr 1X  ONCE IV  Last

administered on 3/18/20at 03:21;  Start 3/18/20 at 03:30;  Stop 3/18/20 at 

03:59;  Status DC


Sodium Bicarbonate 50 meq/Sodium Chloride 1,050 ml @  75 mls/hr Q14H IV  Last 

administered on 3/22/20at 21:10;  Start 3/18/20 at 07:30;  Stop 3/23/20 at 

10:28;  Status DC


Calcium Gluconate 2000 mg/Sodium Chloride 120 ml @  220 mls/hr 1X  ONCE IV  Last

administered on 3/18/20at 09:05;  Start 3/18/20 at 07:30;  Stop 3/18/20 at 

08:02;  Status DC


Lidocaine HCl (Xylocaine-Mpf 1% 2ml Vial) 2 ml STK-MED ONCE .ROUTE ;  Start 

3/18/20 at 08:47;  Stop 3/18/20 at 08:47;  Status DC


Meropenem 500 mg/ Sodium Chloride 50 ml @  100 mls/hr Q12HR IV  Last 

administered on 3/23/20at 21:01;  Start 3/18/20 at 18:00;  Stop 3/24/20 at 

07:58;  Status DC


Lidocaine HCl (Buffered Lidocaine 1%) 3 ml STK-MED ONCE .ROUTE ;  Start 3/18/20 

at 09:46;  Stop 3/18/20 at 09:46;  Status DC


Lidocaine HCl (Buffered Lidocaine 1%) 6 ml 1X  ONCE INJ  Last administered on 

3/18/20at 10:26;  Start 3/18/20 at 10:15;  Stop 3/18/20 at 10:16;  Status DC


Info (Tpn Per Pharmacy) 1 each PRN DAILY  PRN MC SEE COMMENTS Last administered 

on 4/16/20at 13:19;  Start 3/18/20 at 12:00


Sodium Chloride 1,000 ml @  1,000 mls/hr Q1H PRN IV hypotension;  Start 3/18/20 

at 12:07;  Stop 3/18/20 at 18:06;  Status DC


Diphenhydramine HCl (Benadryl) 25 mg 1X PRN  PRN IV ITCHING;  Start 3/18/20 at 

12:15;  Stop 3/19/20 at 12:14;  Status DC


Diphenhydramine HCl (Benadryl) 25 mg 1X PRN  PRN IV ITCHING;  Start 3/18/20 at 

12:15;  Stop 3/19/20 at 12:14;  Status DC


Sodium Chloride 1,000 ml @  400 mls/hr Q2H30M PRN IV PATENCY;  Start 3/18/20 at 

12:07;  Stop 3/19/20 at 00:06;  Status DC


Info (PHARMACY MONITORING -- do not chart) 1 each PRN DAILY  PRN MC SEE 

COMMENTS;  Start 3/18/20 at 12:15;  Stop 3/20/20 at 08:13;  Status DC


Sodium Chloride 90 meq/Calcium Gluconate 10 meq/ Multivitamins 10 ml/Chromium/ 

Copper/Manganese/ Seleni/Zn 1 ml/ Total Parenteral Nutrition/Amino 

Acids/Dextrose/ Fat Emulsion Intravenous 55.005 ml  @ 2.292 mls/hr TPN  CONT IV 

;  Start 3/18/20 at 22:00;  Stop 3/18/20 at 12:33;  Status DC


Info (Tpn Per Pharmacy) 1 each PRN DAILY  PRN MC SEE COMMENTS;  Start 3/18/20 at

12:30;  Status UNV


Sodium Chloride 90 meq/Calcium Gluconate 10 meq/ Multivitamins 10 ml/Chromium/ 

Copper/Manganese/ Seleni/Zn 0.5 ml/ Total Parenteral Nutrition/Amino 

Acids/Dextrose/ Fat Emulsion Intravenous 1,512 ml @  63 mls/hr TPN  CONT IV  

Last administered on 3/18/20at 22:06;  Start 3/18/20 at 22:00;  Stop 3/19/20 at 

21:59;  Status DC


Calcium Carbonate/ Glycine (Tums) 500 mg PRN AFTMEALHC  PRN PO INDIGESTION;  

Start 3/18/20 at 17:45


Calcium Gluconate (Calcium Gluconate) 2,000 mg 1X  ONCE IVP  Last administered 

on 3/19/20at 02:19;  Start 3/19/20 at 02:15;  Stop 3/19/20 at 02:16;  Status DC


Calcium Chloride 3000 mg/Sodium Chloride 1,030 ml @  50 mls/hr [id number] IV  Last 

administered on 3/21/20at 02:17;  Start 3/19/20 at 08:00;  Stop 3/21/20 at 

15:23;  Status DC


Lorazepam (Ativan Inj) 1 mg PRN Q4HRS  PRN IVP ANXIETY / AGITATION, 2nd choic 

Last administered on 4/17/20at 03:51;  Start 3/19/20 at 09:00


Sodium Chloride 1,000 ml @  1,000 mls/hr Q1H PRN IV hypotension;  Start 3/19/20 

at 08:56;  Stop 3/19/20 at 14:55;  Status DC


Albumin Human 200 ml @  200 mls/hr 1X PRN  PRN IV Hypotension;  Start 3/19/20 at

09:00;  Stop 3/19/20 at 14:59;  Status DC


Diphenhydramine HCl (Benadryl) 25 mg 1X PRN  PRN IV ITCHING;  Start 3/19/20 at 

09:00;  Stop 3/20/20 at 08:59;  Status DC


Diphenhydramine HCl (Benadryl) 25 mg 1X PRN  PRN IV ITCHING;  Start 3/19/20 at 

09:00;  Stop 3/20/20 at 08:59;  Status DC


Sodium Chloride 1,000 ml @  400 mls/hr Q2H30M PRN IV PATENCY;  Start 3/19/20 at 

08:56;  Stop 3/19/20 at 20:55;  Status DC


Info (PHARMACY MONITORING -- do not chart) 1 each PRN DAILY  PRN MC SEE MIKEY

TS;  Start 3/19/20 at 09:00;  Status UNV


Info (PHARMACY MONITORING -- do not chart) 1 each PRN DAILY  PRN MC SEE 

COMMENTS;  Start 3/19/20 at 09:00;  Stop 3/20/20 at 08:13;  Status DC


Digoxin (Lanoxin) 500 mcg 1X  ONCE IV  Last administered on 3/19/20at 10:04;  

Start 3/19/20 at 10:00;  Stop 3/19/20 at 10:01;  Status DC


Digoxin (Lanoxin) 125 mcg 1X  ONCE IV  Last administered on 3/19/20at 17:10;  

Start 3/19/20 at 18:00;  Stop 3/19/20 at 18:01;  Status DC


Magnesium Sulfate 100 ml @  25 mls/hr 1X  ONCE IV  Last administered on 

3/19/20at 12:48;  Start 3/19/20 at 13:00;  Stop 3/19/20 at 16:59;  Status DC


Sodium Chloride 90 meq/Magnesium Sulfate 10 meq/ Calcium Gluconate 20 meq/ 

Multivitamins 10 ml/Chromium/ Copper/Manganese/ Seleni/Zn 0.5 ml/ Total 

Parenteral Nutrition/Amino Acids/Dextrose/ Fat Emulsion Intravenous 1,512 ml @  

63 mls/hr TPN  CONT IV  Last administered on 3/19/20at 22:25;  Start 3/19/20 at 

22:00;  Stop 3/20/20 at 21:59;  Status DC


Sodium Chloride 1,000 ml @  1,000 mls/hr Q1H PRN IV hypotension;  Start 3/20/20 

at 08:05;  Stop 3/20/20 at 14:04;  Status DC


Albumin Human 200 ml @  200 mls/hr 1X  ONCE IV  Last administered on 3/20/20at 

08:57;  Start 3/20/20 at 08:15;  Stop 3/20/20 at 09:14;  Status DC


Diphenhydramine HCl (Benadryl) 25 mg 1X PRN  PRN IV ITCHING;  Start 3/20/20 at 

08:15;  Stop 3/21/20 at 08:14;  Status DC


Diphenhydramine HCl (Benadryl) 25 mg 1X PRN  PRN IV ITCHING;  Start 3/20/20 at 

08:15;  Stop 3/21/20 at 08:14;  Status DC


Sodium Chloride 1,000 ml @  400 mls/hr Q2H30M PRN IV PATENCY;  Start 3/20/20 at 

08:05;  Stop 3/20/20 at 20:04;  Status DC


Info (PHARMACY MONITORING -- do not chart) 1 each PRN DAILY  PRN MC SEE CO

MMENTS;  Start 3/20/20 at 08:15;  Stop 3/24/20 at 07:57;  Status DC


Sodium Chloride 90 meq/Potassium Chloride 15 meq/ Potassium Phosphate 10 mmol/ 

Magnesium Sulfate 10 meq/Calcium Gluconate 20 meq/ Multivitamins 10 ml/Chromium/

Copper/Manganese/ Seleni/Zn 0.5 ml/ Total Parenteral Nutrition/Amino 

Acids/Dextrose/ Fat Emulsion Intravenous 1,512 ml @  63 mls/hr TPN  CONT IV  

Last administered on 3/20/20at 21:01;  Start 3/20/20 at 22:00;  Stop 3/21/20 at 

21:59;  Status DC


Potassium Chloride/Water 100 ml @  100 mls/hr 1X  ONCE IV  Last administered on 

3/20/20at 14:09;  Start 3/20/20 at 14:00;  Stop 3/20/20 at 14:59;  Status DC


Benzocaine (Hurricaine One) 1 spray 1X  ONCE MM  Last administered on 3/20/20at 

16:38;  Start 3/20/20 at 14:30;  Stop 3/20/20 at 14:31;  Status DC


Lidocaine HCl (Glydo (Lidocaine) Jelly) 1 thomas 1X  ONCE MM  Last administered on 

3/20/20at 16:38;  Start 3/20/20 at 14:30;  Stop 3/20/20 at 14:31;  Status DC


Linezolid/Dextrose 300 ml @  300 mls/hr Q12HR IV  Last administered on 3/26/20at

21:04;  Start 3/20/20 at 20:00;  Stop 3/27/20 at 07:50;  Status DC


Acetaminophen (Tylenol) 650 mg PRN Q6HRS  PRN PO MILD PAIN / TEMP;  Start 

3/21/20 at 03:30;  Stop 3/21/20 at 03:36;  Status DC


Acetaminophen (Tylenol) 650 mg PRN Q6HRS  PRN PEG MILD PAIN / TEMP Last 

administered on 4/16/20at 19:56;  Start 3/21/20 at 03:36


Sodium Chloride 1,000 ml @  1,000 mls/hr Q1H PRN IV hypotension;  Start 3/21/20 

at 07:50;  Stop 3/21/20 at 13:49;  Status DC


Albumin Human 200 ml @  200 mls/hr 1X PRN  PRN IV Hypotension;  Start 3/21/20 at

08:00;  Stop 3/21/20 at 13:59;  Status DC


Sodium Chloride (Normal Saline Flush) 10 ml 1X PRN  PRN IV AP catheter pack;  S

tart 3/21/20 at 08:00;  Stop 3/22/20 at 07:59;  Status DC


Sodium Chloride (Normal Saline Flush) 10 ml 1X PRN  PRN IV  catheter pack;  

Start 3/21/20 at 08:00;  Stop 3/22/20 at 07:59;  Status DC


Sodium Chloride 1,000 ml @  400 mls/hr Q2H30M PRN IV PATENCY;  Start 3/21/20 at 

07:50;  Stop 3/21/20 at 19:49;  Status DC


Info (PHARMACY MONITORING -- do not chart) 1 each PRN DAILY  PRN MC SEE 

COMMENTS;  Start 3/21/20 at 08:00;  Status UNV


Info (PHARMACY MONITORING -- do not chart) 1 each PRN DAILY  PRN MC SEE COMM

ENTS;  Start 3/21/20 at 08:00;  Stop 3/23/20 at 08:25;  Status DC


Sodium Chloride 90 meq/Potassium Chloride 15 meq/ Potassium Phosphate 10 mmol/ 

Magnesium Sulfate 10 meq/Calcium Gluconate 20 meq/ Multivitamins 10 ml/Chromium/

Copper/Manganese/ Seleni/Zn 0.5 ml/ Total Parenteral Nutrition/Amino 

Acids/Dextrose/ Fat Emulsion Intravenous 1,512 ml @  63 mls/hr TPN  CONT IV  

Last administered on 3/21/20at 20:57;  Start 3/21/20 at 22:00;  Stop 3/22/20 at 

21:59;  Status DC


Sodium Chloride 90 meq/Potassium Chloride 15 meq/ Potassium Phosphate 15 mmol/ 

Magnesium Sulfate 10 meq/Calcium Gluconate 20 meq/ Multivitamins 10 ml/Chromium/

Copper/Manganese/ Seleni/Zn 0.5 ml/ Total Parenteral Nutrition/Amino 

Acids/Dextrose/ Fat Emulsion Intravenous 1,512 ml @  63 mls/hr TPN  CONT IV ;  

Start 3/22/20 at 22:00;  Stop 3/22/20 at 14:16;  Status DC


Sodium Chloride 90 meq/Potassium Chloride 15 meq/ Potassium Phosphate 15 mmol/ 

Magnesium Sulfate 10 meq/Calcium Gluconate 20 meq/ Multivitamins 10 ml/Chromium/

Copper/Manganese/ Seleni/Zn 0.5 ml/ Total Parenteral Nutrition/Amino 

Acids/Dextrose/ Fat Emulsion Intravenous 1,200 ml @  50 mls/hr TPN  CONT IV ;  

Start 3/22/20 at 22:00;  Stop 3/22/20 at 14:17;  Status DC


Sodium Chloride 90 meq/Potassium Chloride 15 meq/ Potassium Phosphate 10 mmol/ 

Magnesium Sulfate 10 meq/Calcium Gluconate 20 meq/ Multivitamins 10 ml/Chromium/

Copper/Manganese/ Seleni/Zn 0.5 ml/ Total Parenteral Nutrition/Amino 

Acids/Dextrose/ Fat Emulsion Intravenous 1,200 ml @  50 mls/hr TPN  CONT IV  

Last administered on 3/22/20at 23:29;  Start 3/22/20 at 22:00;  Stop 3/23/20 at 

21:59;  Status DC


Sodium Chloride 1,000 ml @  1,000 mls/hr Q1H PRN IV hypotension;  Start 3/23/20 

at 07:28;  Stop 3/23/20 at 13:27;  Status DC


Albumin Human 200 ml @  200 mls/hr 1X  ONCE IV  Last administered on 3/23/20at 

08:51;  Start 3/23/20 at 07:30;  Stop 3/23/20 at 08:29;  Status DC


Diphenhydramine HCl (Benadryl) 25 mg 1X PRN  PRN IV ITCHING;  Start 3/23/20 at 

07:30;  Stop 3/24/20 at 07:29;  Status DC


Diphenhydramine HCl (Benadryl) 25 mg 1X PRN  PRN IV ITCHING;  Start 3/23/20 at 

07:30;  Stop 3/24/20 at 07:29;  Status DC


Sodium Chloride 1,000 ml @  400 mls/hr Q2H30M PRN IV PATENCY;  Start 3/23/20 at 

07:28;  Stop 3/23/20 at 19:27;  Status DC


Info (PHARMACY MONITORING -- do not chart) 1 each PRN DAILY  PRN MC SEE 

COMMENTS;  Start 3/23/20 at 07:30;  Stop 4/3/20 at 13:01;  Status DC


Metronidazole 100 ml @  100 mls/hr Q6HRS IV  Last administered on 4/8/20at 

06:26;  Start 3/23/20 at 08:30;  Stop 4/8/20 at 09:58;  Status DC


Micafungin Sodium 100 mg/Dextrose 100 ml @  100 mls/hr Q24H IV  Last 

administered on 4/16/20at 15:08;  Start 3/23/20 at 09:00


Propofol 0 ml @ As Directed STK-MED ONCE IV ;  Start 3/23/20 at 07:53;  Stop 

3/23/20 at 07:53;  Status DC


Etomidate (Amidate) 20 mg STK-MED ONCE IV ;  Start 3/23/20 at 07:53;  Stop 

3/23/20 at 07:54;  Status DC


Midazolam HCl (Versed) 5 mg STK-MED ONCE .ROUTE ;  Start 3/23/20 at 07:57;  Stop

3/23/20 at 07:57;  Status DC


Fentanyl Citrate 30 ml @ 0 mls/hr CONT  PRN IV SEE PROTOCOL Last administered on

4/17/20at 06:12;  Start 3/23/20 at 08:15


Artificial Tears (Artificial Tears) 1 drop PRN Q1HR  PRN OU DRY EYE, 1st choice;

 Start 3/23/20 at 08:15


Midazolam HCl 50 mg/Sodium Chloride 50 ml @ 0 mls/hr CONT  PRN IV SEE PROTOCOL 

Last administered on 3/26/20at 22:39;  Start 3/23/20 at 08:15;  Stop 3/28/20 at 

15:59;  Status DC


Etomidate (Amidate) 8 mg 1X  ONCE IV  Last administered on 3/23/20at 08:33;  

Start 3/23/20 at 08:30;  Stop 3/23/20 at 08:31;  Status DC


Succinylcholine Chloride (Anectine) 120 mg 1X  ONCE IV  Last administered on 

3/23/20at 08:34;  Start 3/23/20 at 08:30;  Stop 3/23/20 at 08:31;  Status DC


Midazolam HCl (Versed) 5 mg 1X  ONCE IV ;  Start 3/23/20 at 08:30;  Stop 3/23/20

at 08:31;  Status DC


Potassium Chloride 15 meq/ Bicarbonate Dialysis Soln w/ out KCl 5,007.5 ml  @ 

1,000 mls/ hr Q5H1M IV  Last administered on 3/24/20at 11:11;  Start 3/23/20 at 

12:00;  Stop 3/24/20 at 11:15;  Status DC


Potassium Chloride 15 meq/ Bicarbonate Dialysis Soln w/ out KCl 5,007.5 ml  @ 

1,000 mls/ hr Q5H1M IV  Last administered on 3/24/20at 11:12;  Start 3/23/20 at 

12:00;  Stop 3/24/20 at 11:17;  Status DC


Potassium Chloride 15 meq/ Bicarbonate Dialysis Soln w/ out KCl 5,007.5 ml  @ 

1,000 mls/ hr Q5H1M IV  Last administered on 3/24/20at 11:11;  Start 3/23/20 at 

12:00;  Stop 3/24/20 at 11:19;  Status DC


Sodium Chloride 90 meq/Potassium Chloride 15 meq/ Potassium Phosphate 10 mmol/ 

Magnesium Sulfate 10 meq/Calcium Gluconate 20 meq/ Multivitamins 10 ml/Chromium/

Copper/Manganese/ Seleni/Zn 0.5 ml/ Total Parenteral Nutrition/Amino 

Acids/Dextrose/ Fat Emulsion Intravenous 1,400 ml @  58.333 mls/ hr TPN  CONT IV

 Last administered on 3/23/20at 21:42;  Start 3/23/20 at 22:00;  Stop 3/24/20 at

21:59;  Status DC


Heparin Sodium (Porcine) (Heparin Sodium) 5,000 unit Q8HRS SQ  Last administered

on 3/28/20at 05:55;  Start 3/23/20 at 15:00;  Stop 3/28/20 at 13:28;  Status DC


Meropenem 500 mg/ Sodium Chloride 50 ml @  100 mls/hr Q6HRS IV  Last 

administered on 3/25/20at 06:00;  Start 3/24/20 at 09:00;  Stop 3/25/20 at 

07:29;  Status DC


Potassium Phosphate 20 mmol/ Sodium Chloride 106.6667 ml @  51.667 m... 1X  ONCE

IV  Last administered on 3/24/20at 11:22;  Start 3/24/20 at 10:15;  Stop 3/24/20

at 12:18;  Status DC


Acetaminophen (Tylenol Supp) 650 mg PRN Q6HRS  PRN [place] MILD PAIN / TEMP Last 

administered on 4/14/20at 08:01;  Start 3/24/20 at 10:30


Potassium Chloride/Water 100 ml @  100 mls/hr Q1H IV  Last administered on 

3/24/20at 12:12;  Start 3/24/20 at 11:00;  Stop 3/24/20 at 12:59;  Status DC


Potassium Chloride 20 meq/ Bicarbonate Dialysis Soln w/ out KCl 5,010 ml @  

1,000 mls/hr Q5H1M IV  Last administered on 3/25/20at 08:48;  Start 3/24/20 at 

12:00;  Stop 3/25/20 at 13:03;  Status DC


Potassium Chloride 20 meq/ Bicarbonate Dialysis Soln w/ out KCl 5,010 ml @  

1,000 mls/hr Q5H1M IV  Last administered on 3/29/20at 14:52;  Start 3/24/20 at 

11:30;  Stop 3/29/20 at 19:59;  Status DC


Potassium Chloride 20 meq/ Bicarbonate Dialysis Soln w/ out KCl 5,010 ml @  

1,000 mls/hr Q5H1M IV  Last administered on 3/29/20at 14:53;  Start 3/24/20 at 

11:30;  Stop 3/29/20 at 19:59;  Status DC


Sodium Chloride 90 meq/Potassium Chloride 15 meq/ Potassium Phosphate 15 mmol/ 

Magnesium Sulfate 10 meq/Calcium Gluconate 15 meq/ Multivitamins 10 ml/Chromium/

Copper/Manganese/ Seleni/Zn 0.5 ml/ Total Parenteral Nutrition/Amino 

Acids/Dextrose/ Fat Emulsion Intravenous 1,400 ml @  58.333 mls/ hr TPN  CONT IV

 Last administered on 3/24/20at 22:17;  Start 3/24/20 at 22:00;  Stop 3/25/20 at

21:59;  Status DC


Cefepime HCl (Maxipime) 2 gm Q12HR IVP  Last administered on 4/7/20at 20:56;  

Start 3/25/20 at 09:00;  Stop 4/8/20 at 09:58;  Status DC


Daptomycin 500 mg/ Sodium Chloride 50 ml @  100 mls/hr Q48H IV  Last 

administered on 4/10/20at 09:57;  Start 3/25/20 at 08:30;  Stop 4/10/20 at 

10:07;  Status DC


Lidocaine HCl (Buffered Lidocaine 1%) 3 ml 1X  ONCE INJ  Last administered on 

3/25/20at 10:27;  Start 3/25/20 at 10:30;  Stop 3/25/20 at 10:31;  Status DC


Potassium Phosphate 20 mmol/ Sodium Chloride 106.6667 ml @  51.667 m... 1X  ONCE

IV  Last administered on 3/25/20at 12:51;  Start 3/25/20 at 13:00;  Stop 3/25/20

at 15:03;  Status DC


Sodium Chloride 90 meq/Potassium Chloride 15 meq/ Potassium Phosphate 18 mmol/ 

Magnesium Sulfate 8 meq/Calcium Gluconate 15 meq/ Multivitamins 10 ml/Chromium/ 

Copper/Manganese/ Seleni/Zn 0.5 ml/ Total Parenteral Nutrition/Amino 

Acids/Dextrose/ Fat Emulsion Intravenous 1,400 ml @  58.333 mls/ hr TPN  CONT IV

 Last administered on 3/25/20at 22:16;  Start 3/25/20 at 22:00;  Stop 3/26/20 at

21:59;  Status DC


Potassium Chloride 20 meq/ Bicarbonate Dialysis Soln w/ out KCl 5,010 ml @  

1,000 mls/hr Q5H1M IV  Last administered on 3/29/20at 14:54;  Start 3/25/20 at 

16:00;  Stop 3/29/20 at 19:59;  Status DC


Multi-Ingred Cream/Lotion/Oil/ Oint (Artificial Tears Eye Ointment) 1 thomas PRN 

Q1HR  PRN OU DRY EYE, 2nd choice Last administered on 4/13/20at 08:19;  Start 

3/25/20 at 17:30


Sodium Chloride 90 meq/Potassium Chloride 15 meq/ Potassium Phosphate 18 mmol/ 

Magnesium Sulfate 8 meq/Calcium Gluconate 15 meq/ Multivitamins 10 ml/Chromium/ 

Copper/Manganese/ Seleni/Zn 0.5 ml/ Total Parenteral Nutrition/Amino 

Acids/Dextrose/ Fat Emulsion Intravenous 1,400 ml @  58.333 mls/ hr TPN  CONT IV

 Last administered on 3/26/20at 22:00;  Start 3/26/20 at 22:00;  Stop 3/27/20 at

21:59;  Status DC


Albumin Human 500 ml @  125 mls/hr 1X  ONCE IV ;  Start 3/26/20 at 14:15;  Stop 

3/26/20 at 18:14;  Status DC


Sodium Chloride 90 meq/Potassium Chloride 15 meq/ Potassium Phosphate 18 mmol/ 

Magnesium Sulfate 8 meq/Calcium Gluconate 15 meq/ Multivitamins 10 ml/Chromium/ 

Copper/Manganese/ Seleni/Zn 0.5 ml/ Insulin Human Regular 10 unit/ Total 

Parenteral Nutrition/Amino Acids/Dextrose/ Fat Emulsion Intravenous 1,400 ml @  

58.333 mls/ hr TPN  CONT IV  Last administered on 3/27/20at 21:43;  Start 

3/27/20 at 22:00;  Stop 3/28/20 at 21:59;  Status DC


Lidocaine HCl (Buffered Lidocaine 1%) 3 ml STK-MED ONCE .ROUTE ;  Start 3/25/20 

at 10:00;  Stop 3/27/20 at 13:57;  Status DC


Midazolam HCl 100 mg/Sodium Chloride 100 ml @ 7 mls/hr CONT  PRN IV SEE PROTOCOL

Last administered on 4/8/20at 15:35;  Start 3/28/20 at 16:00


Sodium Chloride 90 meq/Potassium Chloride 15 meq/ Potassium Phosphate 18 mmol/ 

Magnesium Sulfate 8 meq/Calcium Gluconate 15 meq/ Multivitamins 10 ml/Chromium/ 

Copper/Manganese/ Seleni/Zn 0.5 ml/ Insulin Human Regular 15 unit/ Total 

Parenteral Nutrition/Amino Acids/Dextrose/ Fat Emulsion Intravenous 1,400 ml @  

58.333 mls/ hr TPN  CONT IV  Last administered on 3/28/20at 20:34;  Start 

3/28/20 at 22:00;  Stop 3/29/20 at 21:59;  Status DC


Info ([facility] Electrolyte Protocol) 1 ea CONT PRN  PRN MC PER PROTOCOL;  Start 

3/29/20 at 13:15


Sodium Chloride 90 meq/Potassium Chloride 15 meq/ Potassium Phosphate 18 mmol/ 

Magnesium Sulfate 8 meq/Calcium Gluconate 15 meq/ Multivitamins 10 ml/Chromium/ 

Copper/Manganese/ Seleni/Zn 0.5 ml/ Insulin Human Regular 15 unit/ Total 

Parenteral Nutrition/Amino Acids/Dextrose/ Fat Emulsion Intravenous 1,400 ml @  

58.333 mls/ hr TPN  CONT IV  Last administered on 3/29/20at 22:05;  Start 

3/29/20 at 22:00;  Stop 3/30/20 at 21:59;  Status DC


Potassium Chloride 15 meq/ Bicarbonate Dialysis Soln w/ out KCl 5,007.5 ml  @ 

1,000 mls/ hr Q5H1M IV  Last administered on 4/1/20at 18:14;  Start 3/29/20 at 

20:00;  Stop 4/2/20 at 13:08;  Status DC


Potassium Chloride 15 meq/ Bicarbonate Dialysis Soln w/ out KCl 5,007.5 ml  @ 

1,000 mls/ hr Q5H1M IV  Last administered on 4/1/20at 18:14;  Start 3/29/20 at 

20:00;  Stop 4/2/20 at 13:08;  Status DC


Potassium Chloride 15 meq/ Bicarbonate Dialysis Soln w/ out KCl 5,007.5 ml  @ 

1,000 mls/ hr Q5H1M IV  Last administered on 4/1/20at 18:14;  Start 3/29/20 at 

20:00;  Stop 4/2/20 at 13:08;  Status DC


Iohexol (Omnipaque 240 Mg/ml) 30 ml 1X  ONCE PO  Last administered on 3/30/20at 

11:30;  Start 3/30/20 at 11:30;  Stop 3/30/20 at 11:33;  Status DC


Info (CONTRAST GIVEN -- Rx MONITORING) 1 each PRN DAILY  PRN MC SEE COMMENTS;  

Start 3/30/20 at 11:45;  Stop 4/1/20 at 11:44;  Status DC


Sodium Chloride 90 meq/Potassium Chloride 15 meq/ Potassium Phosphate 18 mmol/ 

Magnesium Sulfate 8 meq/Calcium Gluconate 15 meq/ Multivitamins 10 ml/Chromium/ 

Copper/Manganese/ Seleni/Zn 0.5 ml/ Insulin Human Regular 15 unit/ Total 

Parenteral Nutrition/Amino Acids/Dextrose/ Fat Emulsion Intravenous 1,400 ml @  

58.333 mls/ hr TPN  CONT IV  Last administered on 3/30/20at 21:47;  Start 

3/30/20 at 22:00;  Stop 3/31/20 at 21:59;  Status DC


Sodium Chloride 90 meq/Potassium Chloride 15 meq/ Potassium Phosphate 18 mmol/ 

Magnesium Sulfate 8 meq/Calcium Gluconate 15 meq/ Multivitamins 10 ml/Chromium/ 

Copper/Manganese/ Seleni/Zn 0.5 ml/ Insulin Human Regular 20 unit/ Total 

Parenteral Nutrition/Amino Acids/Dextrose/ Fat Emulsion Intravenous 1,400 ml @  

58.333 mls/ hr TPN  CONT IV  Last administered on 3/31/20at 21:36;  Start 

3/31/20 at 22:00;  Stop 4/1/20 at 21:59;  Status DC


Alteplase, Recombinant (Cathflo For Central Catheter Clearance) 1 mg 1X  ONCE 

INT CAT  Last administered on 3/31/20at 20:03;  Start 3/31/20 at 19:30;  Stop 

3/31/20 at 19:46;  Status DC


Alteplase, Recombinant (Cathflo For Central Catheter Clearance) 1 mg 1X  ONCE 

INT CAT  Last administered on 3/31/20at 22:05;  Start 3/31/20 at 22:00;  Stop 

3/31/20 at 22:01;  Status DC


Sodium Chloride 90 meq/Potassium Chloride 15 meq/ Potassium Phosphate 18 mmol/ 

Magnesium Sulfate 8 meq/Calcium Gluconate 15 meq/ Multivitamins 10 ml/Chromium/ 

Copper/Manganese/ Seleni/Zn 0.5 ml/ Insulin Human Regular 20 unit/ Total 

Parenteral Nutrition/Amino Acids/Dextrose/ Fat Emulsion Intravenous 1,400 ml @  

58.333 mls/ hr TPN  CONT IV  Last administered on 4/1/20at 21:30;  Start 4/1/20 

at 22:00;  Stop 4/2/20 at 21:59;  Status DC


Dexmedetomidine HCl 400 mcg/ Sodium Chloride 100 ml @ 0 mls/hr CONT  PRN IV 

ANXIETY / AGITATION Last administered on 4/17/20at 04:12;  Start 4/2/20 at 08:15


Sodium Chloride 500 ml @  500 mls/hr 1X PRN  PRN IV ELEVATED BP, SEE COMMENTS;  

Start 4/2/20 at 08:15


Atropine Sulfate (ATROPINE 0.5mg SYRINGE) 0.5 mg PRN Q5MIN  PRN IV SEE COMMENTS;

 Start 4/2/20 at 08:15


Furosemide (Lasix) 20 mg 1X  ONCE IVP  Last administered on 4/2/20at 08:19;  

Start 4/2/20 at 08:15;  Stop 4/2/20 at 08:16;  Status DC


Lidocaine HCl (Buffered Lidocaine 1%) 3 ml STK-MED ONCE .ROUTE ;  Start 4/2/20 a

t 08:39;  Stop 4/2/20 at 08:39;  Status DC


Lidocaine HCl (Buffered Lidocaine 1%) 6 ml 1X  ONCE INJ  Last administered on 

4/2/20at 09:05;  Start 4/2/20 at 09:00;  Stop 4/2/20 at 09:06;  Status DC


Sodium Chloride 90 meq/Potassium Chloride 15 meq/ Potassium Phosphate 18 mmol/ 

Magnesium Sulfate 8 meq/Calcium Gluconate 15 meq/ Multivitamins 10 ml/Chromium/ 

Copper/Manganese/ Seleni/Zn 0.5 ml/ Insulin Human Regular 20 unit/ Total 

Parenteral Nutrition/Amino Acids/Dextrose/ Fat Emulsion Intravenous 1,400 ml @  

58.333 mls/ hr TPN  CONT IV  Last administered on 4/2/20at 22:45;  Start 4/2/20 

at 22:00;  Stop 4/3/20 at 21:59;  Status DC


Sodium Chloride 1,000 ml @  1,000 mls/hr Q1H PRN IV hypotension;  Start 4/3/20 

at 07:30;  Stop 4/3/20 at 13:29;  Status DC


Albumin Human 200 ml @  200 mls/hr 1X PRN  PRN IV Hypotension Last administered 

on 4/3/20at 09:36;  Start 4/3/20 at 07:30;  Stop 4/3/20 at 13:29;  Status DC


Sodium Chloride (Normal Saline Flush) 10 ml 1X PRN  PRN IV AP catheter pack;  

Start 4/3/20 at 07:30;  Stop 4/3/20 at 21:29;  Status DC


Sodium Chloride (Normal Saline Flush) 10 ml 1X PRN  PRN IV  catheter pack;  

Start 4/3/20 at 07:30;  Stop 4/4/20 at 07:29;  Status DC


Sodium Chloride 1,000 ml @  400 mls/hr Q2H30M PRN IV PATENCY;  Start 4/3/20 at 

07:30;  Stop 4/3/20 at 19:29;  Status DC


Info (PHARMACY MONITORING -- do not chart) 1 each PRN DAILY  PRN MC SEE 

COMMENTS;  Start 4/3/20 at 07:30;  Stop 4/3/20 at 13:02;  Status DC


Info (PHARMACY MONITORING -- do not chart) 1 each PRN DAILY  PRN MC SEE 

COMMENTS;  Start 4/3/20 at 07:30;  Stop 4/5/20 at 12:45;  Status DC


Sodium Chloride 90 meq/Potassium Chloride 15 meq/ Potassium Phosphate 10 mmol/ 

Magnesium Sulfate 8 meq/Calcium Gluconate 15 meq/ Multivitamins 10 ml/Chromium/ 

Copper/Manganese/ Seleni/Zn 0.5 ml/ Insulin Human Regular 25 unit/ Total 

Parenteral Nutrition/Amino Acids/Dextrose/ Fat Emulsion Intravenous 1,400 ml @  

58.333 mls/ hr TPN  CONT IV  Last administered on 4/3/20at 22:19;  Start 4/3/20 

at 22:00;  Stop 4/4/20 at 21:59;  Status DC


Heparin Sodium (Porcine) (Heparin Sodium) 5,000 unit Q12HR SQ  Last administered

on 4/16/20at 21:10;  Start 4/3/20 at 21:00


Ondansetron HCl (Zofran) 4 mg PRN Q6HRS  PRN IV NAUSEA/VOMITING;  Start 4/6/20 

at 07:00;  Stop 4/7/20 at 06:59;  Status DC


Fentanyl Citrate (Fentanyl 2ml Vial) 25 mcg PRN Q5MIN  PRN IV MILD PAIN 1-3;  

Start 4/6/20 at 07:00;  Stop 4/7/20 at 06:59;  Status DC


Fentanyl Citrate (Fentanyl 2ml Vial) 50 mcg PRN Q5MIN  PRN IV MODERATE TO SEVERE

PAIN;  Start 4/6/20 at 07:00;  Stop 4/7/20 at 06:59;  Status DC


Ringer's Solution 1,000 ml @  30 mls/hr Q24H IV ;  Start 4/6/20 at 07:00;  Stop 

4/6/20 at 18:59;  Status DC


Lidocaine HCl (Xylocaine-Mpf 1% 2ml Vial) 2 ml PRN 1X  PRN ID PRIOR TO IV START;

 Start 4/6/20 at 07:00;  Stop 4/7/20 at 06:59;  Status DC


Prochlorperazine Edisylate (Compazine) 5 mg PACU PRN  PRN IV NAUSEA, MRX1;  

Start 4/6/20 at 07:00;  Stop 4/7/20 at 06:59;  Status DC


Sodium Chloride 1,000 ml @  1,000 mls/hr Q1H PRN IV hypotension;  Start 4/4/20 

at 09:10;  Stop 4/4/20 at 15:09;  Status DC


Albumin Human 200 ml @  200 mls/hr 1X PRN  PRN IV Hypotension Last administered 

on 4/4/20at 10:10;  Start 4/4/20 at 09:15;  Stop 4/4/20 at 15:14;  Status DC


Sodium Chloride 1,000 ml @  400 mls/hr Q2H30M PRN IV PATENCY;  Start 4/4/20 at 

09:10;  Stop 4/4/20 at 21:09;  Status DC


Info (PHARMACY MONITORING -- do not chart) 1 each PRN DAILY  PRN MC SEE 

COMMENTS;  Start 4/4/20 at 09:15;  Stop 4/5/20 at 12:45;  Status DC


Info (PHARMACY MONITORING -- do not chart) 1 each PRN DAILY  PRN MC SEE 

COMMENTS;  Start 4/4/20 at 09:15;  Stop 4/5/20 at 12:45;  Status DC


Sodium Chloride 90 meq/Potassium Chloride 15 meq/ Potassium Phosphate 10 mmol/ 

Magnesium Sulfate 8 meq/Calcium Gluconate 15 meq/ Multivitamins 10 ml/Chromium/ 

Copper/Manganese/ Seleni/Zn 0.5 ml/ Insulin Human Regular 25 unit/ Total Pare

nteral Nutrition/Amino Acids/Dextrose/ Fat Emulsion Intravenous 1,400 ml @  

58.333 mls/ hr TPN  CONT IV  Last administered on 4/4/20at 22:10;  Start 4/4/20 

at 22:00;  Stop 4/5/20 at 21:59;  Status DC


Magnesium Sulfate 50 ml @ 25 mls/hr PRN DAILY  PRN IV for Mag < 1.7 on am labs; 

Start 4/5/20 at 09:15


Sodium Chloride 90 meq/Potassium Chloride 15 meq/ Potassium Phosphate 10 mmol/ 

Magnesium Sulfate 8 meq/Calcium Gluconate 15 meq/ Multivitamins 10 ml/Chromium/ 

Copper/Manganese/ Seleni/Zn 0.5 ml/ Insulin Human Regular 25 unit/ Total 

Parenteral Nutrition/Amino Acids/Dextrose/ Fat Emulsion Intravenous 1,400 ml @  

58.333 mls/ hr TPN  CONT IV  Last administered on 4/5/20at 21:20;  Start 4/5/20 

at 22:00;  Stop 4/6/20 at 21:59;  Status DC


Sodium Chloride 1,000 ml @  1,000 mls/hr Q1H PRN IV hypotension;  Start 4/5/20 

at 12:23;  Stop 4/5/20 at 18:22;  Status DC


Albumin Human 200 ml @  200 mls/hr 1X  ONCE IV  Last administered on 4/5/20at 

13:34;  Start 4/5/20 at 12:30;  Stop 4/5/20 at 13:29;  Status DC


Diphenhydramine HCl (Benadryl) 25 mg 1X PRN  PRN IV ITCHING;  Start 4/5/20 at 

12:30;  Stop 4/6/20 at 12:29;  Status DC


Diphenhydramine HCl (Benadryl) 25 mg 1X PRN  PRN IV ITCHING;  Start 4/5/20 at 

12:30;  Stop 4/6/20 at 12:29;  Status DC


Info (PHARMACY MONITORING -- do not chart) 1 each PRN DAILY  PRN MC SEE 

COMMENTS;  Start 4/5/20 at 12:30;  Status Cancel


Bupivacaine HCl/ Epinephrine Bitart (Sensorcain-Epi 0.5%-1:[id number] Mpf) 30 ml 

STK-MED ONCE .ROUTE  Last administered on 4/6/20at 11:44;  Start 4/6/20 at 

11:00;  Stop 4/6/20 at 11:01;  Status DC


Cellulose (Surgicel Fibrillar 1x2) 1 each STK-MED ONCE .ROUTE ;  Start 4/6/20 at

11:00;  Stop 4/6/20 at 11:01;  Status DC


Sodium Chloride 90 meq/Potassium Chloride 15 meq/ Potassium Phosphate 10 mmol/ 

Magnesium Sulfate 12 meq/Calcium Gluconate 15 meq/ Multivitamins 10 ml/Chromium/

Copper/Manganese/ Seleni/Zn 0.5 ml/ Insulin Human Regular 25 unit/ Total 

Parenteral Nutrition/Amino Acids/Dextrose/ Fat Emulsion Intravenous 1,400 ml @  

58.333 mls/ hr TPN  CONT IV  Last administered on 4/6/20at 22:24;  Start 4/6/20 

at 22:00;  Stop 4/7/20 at 21:59;  Status DC


Propofol 20 ml @ As Directed STK-MED ONCE IV ;  Start 4/6/20 at 11:07;  Stop 

4/6/20 at 11:07;  Status DC


Cellulose (Surgicel Hemostat 4x8) 1 each STK-MED ONCE .ROUTE  Last administered 

on 4/6/20at 11:44;  Start 4/6/20 at 11:55;  Stop 4/6/20 at 11:56;  Status DC


Sevoflurane (Ultane) 60 ml STK-MED ONCE IH ;  Start 4/6/20 at 12:46;  Stop 

4/6/20 at 12:46;  Status DC


Sodium Chloride 1,000 ml @  1,000 mls/hr Q1H PRN IV hypotension;  Start 4/6/20 

at 13:51;  Stop 4/6/20 at 19:50;  Status DC


Albumin Human 200 ml @  200 mls/hr 1X PRN  PRN IV Hypotension Last administered 

on 4/6/20at 14:51;  Start 4/6/20 at 14:00;  Stop 4/6/20 at 19:59;  Status DC


Diphenhydramine HCl (Benadryl) 25 mg 1X PRN  PRN IV ITCHING;  Start 4/6/20 at 

14:00;  Stop 4/7/20 at 13:59;  Status DC


Diphenhydramine HCl (Benadryl) 25 mg 1X PRN  PRN IV ITCHING;  Start 4/6/20 at 

14:00;  Stop 4/7/20 at 13:59;  Status DC


Sodium Chloride 1,000 ml @  400 mls/hr Q2H30M PRN IV PATENCY;  Start 4/6/20 at 

13:51;  Stop 4/7/20 at 01:50;  Status DC


Info (PHARMACY MONITORING -- do not chart) 1 each PRN DAILY  PRN MC SEE 

COMMENTS;  Start 4/6/20 at 14:00;  Stop 4/9/20 at 08:16;  Status DC


Heparin Sodium (Porcine) (Hep Lock Adult) 500 unit STK-MED ONCE IVP ;  Start 

4/7/20 at 09:29;  Stop 4/7/20 at 09:30;  Status DC


Sodium Chloride 1,000 ml @  1,000 mls/hr Q1H PRN IV hypotension;  Start 4/7/20 

at 10:43;  Stop 4/7/20 at 16:42;  Status DC


Sodium Chloride 1,000 ml @  400 mls/hr Q2H30M PRN IV PATENCY;  Start 4/7/20 at 

10:43;  Stop 4/7/20 at 22:42;  Status DC


Info (PHARMACY MONITORING -- do not chart) 1 each PRN DAILY  PRN MC SEE 

COMMENTS;  Start 4/7/20 at 10:45;  Status UNV


Info (PHARMACY MONITORING -- do not chart) 1 each PRN DAILY  PRN MC SEE 

COMMENTS;  Start 4/7/20 at 10:45;  Status UNV


Sodium Chloride 90 meq/Potassium Chloride 15 meq/ Magnesium Sulfate 12 

meq/Calcium Gluconate 15 meq/ Multivitamins 10 ml/Chromium/ Copper/Manganese/ 

Seleni/Zn 0.5 ml/ Insulin Human Regular 25 unit/ Total Parenteral 

Nutrition/Amino Acids/Dextrose/ Fat Emulsion Intravenous 1,400 ml @  58.333 mls/

hr TPN  CONT IV  Last administered on 4/7/20at 22:13;  Start 4/7/20 at 22:00;  

Stop 4/8/20 at 21:59;  Status DC


Sodium Chloride 1,000 ml @  1,000 mls/hr Q1H PRN IV hypotension;  Start 4/8/20 

at 07:50;  Stop 4/8/20 at 13:49;  Status DC


Albumin Human 200 ml @  200 mls/hr 1X  ONCE IV ;  Start 4/8/20 at 08:00;  Stop 

4/8/20 at 08:53;  Status DC


Diphenhydramine HCl (Benadryl) 25 mg 1X PRN  PRN IV ITCHING;  Start 4/8/20 at 

08:00;  Stop 4/9/20 at 07:59;  Status DC


Diphenhydramine HCl (Benadryl) 25 mg 1X PRN  PRN IV ITCHING;  Start 4/8/20 at 

08:00;  Stop 4/9/20 at 07:59;  Status DC


Info (PHARMACY MONITORING -- do not chart) 1 each PRN DAILY  PRN MC SEE 

COMMENTS;  Start 4/8/20 at 08:00;  Stop 4/9/20 at 08:16;  Status DC


Albumin Human 50 ml @ 50 mls/hr 1X  ONCE IV ;  Start 4/8/20 at 08:53;  Stop 

4/8/20 at 08:56;  Status DC


Albumin Human 200 ml @  50 mls/hr PRN 1X  PRN IV HYPOTENSION Last administered 

on 4/14/20at 11:54;  Start 4/8/20 at 09:00


Meropenem 500 mg/ Sodium Chloride 50 ml @  100 mls/hr Q12H IV  Last administered

on 4/16/20at 23:04;  Start 4/8/20 at 10:00


Sodium Chloride 90 meq/Magnesium Sulfate 12 meq/ Calcium Gluconate 15 meq/ 

Multivitamins 10 ml/Chromium/ Copper/Manganese/ Seleni/Zn 0.5 ml/ Insulin Human 

Regular 25 unit/ Total Parenteral Nutrition/Amino Acids/Dextrose/ Fat Emulsion 

Intravenous 1,400 ml @  58.333 mls/ hr TPN  CONT IV  Last administered on 

4/8/20at 21:41;  Start 4/8/20 at 22:00;  Stop 4/9/20 at 21:59;  Status DC


Sodium Chloride 1,000 ml @  1,000 mls/hr Q1H PRN IV hypotension;  Start 4/9/20 

at 07:58;  Stop 4/9/20 at 13:57;  Status DC


Albumin Human 200 ml @  200 mls/hr 1X PRN  PRN IV Hypotension Last administered 

on 4/9/20at 09:30;  Start 4/9/20 at 08:00;  Stop 4/9/20 at 13:59;  Status DC


Sodium Chloride 1,000 ml @  400 mls/hr Q2H30M PRN IV PATENCY;  Start 4/9/20 at 

07:58;  Stop 4/9/20 at 19:57;  Status DC


Info (PHARMACY MONITORING -- do not chart) 1 each PRN DAILY  PRN MC SEE 

COMMENTS;  Start 4/9/20 at 08:00;  Status Cancel


Info (PHARMACY MONITORING -- do not chart) 1 each PRN DAILY  PRN MC SEE 

COMMENTS;  Start 4/9/20 at 08:15;  Status UNV


Sodium Chloride 90 meq/Potassium Phosphate 5 mmol/ Magnesium Sulfate 12 

meq/Calcium Gluconate 15 meq/ Multivitamins 10 ml/Chromium/ Copper/Manganese/ 

Seleni/Zn 0.5 ml/ Insulin Human Regular 30 unit/ Total Parenteral 

Nutrition/Amino Acids/Dextrose/ Fat Emulsion Intravenous 1,400 ml @  58.333 mls/

hr TPN  CONT IV  Last administered on 4/9/20at 22:08;  Start 4/9/20 at 22:00;  

Stop 4/10/20 at 21:59;  Status DC


Linezolid/Dextrose 300 ml @  300 mls/hr Q12HR IV  Last administered on 4/16/20at

21:09;  Start 4/10/20 at 11:00


Sodium Chloride 90 meq/Potassium Phosphate 15 mmol/ Magnesium Sulfate 12 

meq/Calcium Gluconate 15 meq/ Multivitamins 10 ml/Chromium/ Copper/Manganese/ 

Seleni/Zn 0.5 ml/ Insulin Human Regular 30 unit/ Total Parenteral 

Nutrition/Amino Acids/Dextrose/ Fat Emulsion Intravenous 1,400 ml @  58.333 mls/

hr TPN  CONT IV  Last administered on 4/10/20at 21:49;  Start 4/10/20 at 22:00; 

Stop 4/11/20 at 21:59;  Status DC


Sodium Chloride 90 meq/Potassium Phosphate 15 mmol/ Magnesium Sulfate 12 

meq/Calcium Gluconate 15 meq/ Multivitamins 10 ml/Chromium/ Copper/Manganese/ 

Seleni/Zn 0.5 ml/ Insulin Human Regular 40 unit/ Total Parenteral 

Nutrition/Amino Acids/Dextrose/ Fat Emulsion Intravenous 1,400 ml @  58.333 mls/

hr TPN  CONT IV  Last administered on 4/11/20at 21:21;  Start 4/11/20 at 22:00; 

Stop 4/12/20 at 21:59;  Status DC


Sodium Chloride 1,000 ml @  1,000 mls/hr Q1H PRN IV hypotension;  Start 4/11/20 

at 13:26;  Stop 4/11/20 at 19:25;  Status DC


Albumin Human 200 ml @  200 mls/hr 1X PRN  PRN IV Hypotension Last administered 

on 4/11/20at 15:00;  Start 4/11/20 at 13:30;  Stop 4/11/20 at 19:29;  Status DC


Sodium Chloride (Normal Saline Flush) 10 ml 1X PRN  PRN IV AP catheter pack;  

Start 4/11/20 at 13:30;  Stop 4/12/20 at 13:29;  Status DC


Sodium Chloride (Normal Saline Flush) 10 ml 1X PRN  PRN IV  catheter pack;  

Start 4/11/20 at 13:30;  Stop 4/12/20 at 13:29;  Status DC


Sodium Chloride 1,000 ml @  400 mls/hr Q2H30M PRN IV PATENCY;  Start 4/11/20 at 

13:26;  Stop 4/12/20 at 01:25;  Status DC


Info (PHARMACY MONITORING -- do not chart) 1 each PRN DAILY  PRN MC SEE 

COMMENTS;  Start 4/11/20 at 13:30;  Stop 4/11/20 at 13:33;  Status DC


Info (PHARMACY MONITORING -- do not chart) 1 each PRN DAILY  PRN MC SEE 

COMMENTS;  Start 4/11/20 at 13:30;  Stop 4/11/20 at 13:34;  Status DC


Sodium Chloride 90 meq/Potassium Phosphate 19 mmol/ Magnesium Sulfate 12 

meq/Calcium Gluconate 15 meq/ Multivitamins 10 ml/Chromium/ Copper/Manganese/ 

Seleni/Zn 0.5 ml/ Insulin Human Regular 40 unit/ Total Parenteral 

Nutrition/Amino Acids/Dextrose/ Fat Emulsion Intravenous 1,400 ml @  58.333 mls/

hr TPN  CONT IV  Last administered on 4/12/20at 21:54;  Start 4/12/20 at 22:00; 

Stop 4/13/20 at 21:59;  Status DC


Sodium Chloride 1,000 ml @  1,000 mls/hr Q1H PRN IV hypotension;  Start 4/13/20 

at 09:35;  Stop 4/13/20 at 15:34;  Status DC


Albumin Human 200 ml @  200 mls/hr 1X PRN  PRN IV Hypotension;  Start 4/13/20 at

09:45;  Stop 4/13/20 at 15:44;  Status DC


Diphenhydramine HCl (Benadryl) 25 mg 1X PRN  PRN IV ITCHING;  Start 4/13/20 at 

09:45;  Stop 4/14/20 at 09:44;  Status DC


Diphenhydramine HCl (Benadryl) 25 mg 1X PRN  PRN IV ITCHING;  Start 4/13/20 at 

09:45;  Stop 4/14/20 at 09:44;  Status DC


Sodium Chloride 1,000 ml @  400 mls/hr Q2H30M PRN IV PATENCY;  Start 4/13/20 at 

09:35;  Stop 4/13/20 at 21:34;  Status DC


Info (PHARMACY MONITORING -- do not chart) 1 each PRN DAILY  PRN MC SEE 

COMMENTS;  Start 4/13/20 at 09:45;  Status Cancel


Sodium Chloride 100 meq/Potassium Phosphate 19 mmol/ Magnesium Sulfate 12 

meq/Calcium Gluconate 15 meq/ Multivitamins 10 ml/Chromium/ Copper/Manganese/ 

Seleni/Zn 0.5 ml/ Insulin Human Regular 40 unit/ Potassium Chloride 20 meq/ 

Total Parenteral Nutrition/Amino Acids/Dextrose/ Fat Emulsion Intravenous 1,400 

ml @  58.333 mls/ hr TPN  CONT IV  Last administered on 4/13/20at 22:02;  Start 

4/13/20 at 22:00;  Stop 4/14/20 at 21:59;  Status DC


Furosemide (Lasix) 40 mg 1X  ONCE IVP  Last administered on 4/13/20at 14:39;  

Start 4/13/20 at 14:30;  Stop 4/13/20 at 14:31;  Status DC


Metronidazole 100 ml @  100 mls/hr Q8HRS IV  Last administered on 4/17/20at 0

6:07;  Start 4/14/20 at 10:00


Sodium Chloride 1,000 ml @  1,000 mls/hr Q1H PRN IV hypotension;  Start 4/14/20 

at 08:00;  Stop 4/14/20 at 13:59;  Status DC


Albumin Human 200 ml @  200 mls/hr 1X PRN  PRN IV Hypotension;  Start 4/14/20 at

08:00;  Stop 4/14/20 at 13:59;  Status DC


Sodium Chloride 1,000 ml @  400 mls/hr Q2H30M PRN IV PATENCY;  Start 4/14/20 at 

08:00;  Stop 4/14/20 at 19:59;  Status DC


Info (PHARMACY MONITORING -- do not chart) 1 each PRN DAILY  PRN MC SEE 

COMMENTS;  Start 4/14/20 at 11:30;  Status UNV


Info (PHARMACY MONITORING -- do not chart) 1 each PRN DAILY  PRN MC SEE 

COMMENTS;  Start 4/14/20 at 11:30;  Stop 4/16/20 at 12:13;  Status DC


Sodium Chloride 100 meq/Potassium Phosphate 19 mmol/ Magnesium Sulfate 12 

meq/Calcium Gluconate 15 meq/ Multivitamins 10 ml/Chromium/ Copper/Manganese/ 

Seleni/Zn 0.5 ml/ Insulin Human Regular 40 unit/ Potassium Chloride 20 meq/ 

Total Parenteral Nutrition/Amino Acids/Dextrose/ Fat Emulsion Intravenous 1,400 

ml @  58.333 mls/ hr TPN  CONT IV  Last administered on 4/14/20at 21:52;  Start 

4/14/20 at 22:00;  Stop 4/15/20 at 21:59;  Status DC


Sodium Chloride (Normal Saline Flush) 10 ml QSHIFT  PRN IV AFTER MEDS AND BLOOD 

DRAWS;  Start 4/14/20 at 15:00


Sodium Chloride (Normal Saline Flush) 10 ml PRN Q5MIN  PRN IV AFTER MEDS AND 

BLOOD DRAWS;  Start 4/14/20 at 15:00


Sodium Chloride (Normal Saline Flush) 20 ml PRN Q5MIN  PRN IV AFTER MEDS AND 

BLOOD DRAWS;  Start 4/14/20 at 15:00


Sodium Chloride 100 meq/Potassium Phosphate 19 mmol/ Magnesium Sulfate 12 

meq/Calcium Gluconate 15 meq/ Multivitamins 10 ml/Chromium/ Copper/Manganese/ 

Seleni/Zn 0.5 ml/ Insulin Human Regular 40 unit/ Potassium Chloride 20 meq/ 

Total Parenteral Nutrition/Amino Acids/Dextrose/ Fat Emulsion Intravenous 1,400 

ml @  58.333 mls/ hr TPN  CONT IV  Last administered on 4/15/20at 21:20;  Start 

4/15/20 at 22:00;  Stop 4/16/20 at 21:59;  Status DC


Lidocaine HCl (Buffered Lidocaine 1%) 3 ml STK-MED ONCE .ROUTE ;  Start 4/15/20 

at 13:16;  Stop 4/15/20 at 13:16;  Status DC


Lidocaine HCl (Buffered Lidocaine 1%) 6 ml 1X  ONCE INJ  Last administered on 

4/15/20at 13:45;  Start 4/15/20 at 13:30;  Stop 4/15/20 at 13:31;  Status DC


Albumin Human 100 ml @  100 mls/hr 1X  ONCE IV  Last administered on 4/15/20at 

15:41;  Start 4/15/20 at 15:00;  Stop 4/15/20 at 15:59;  Status DC


Albumin Human 50 ml @ 50 mls/hr 1X  ONCE IV  Last administered on 4/15/20at 

15:00;  Start 4/15/20 at 15:00;  Stop 4/15/20 at 15:59;  Status DC


Info (PHARMACY MONITORING -- do not chart) 1 each PRN DAILY  PRN MC SEE 

COMMENTS;  Start 4/16/20 at 11:30


Info (PHARMACY MONITORING -- do not chart) 1 each PRN DAILY  PRN MC SEE 

COMMENTS;  Start 4/16/20 at 11:30;  Status UNV


Sodium Chloride 100 meq/Potassium Phosphate 10 mmol/ Magnesium Sulfate 12 

meq/Calcium Gluconate 15 meq/ Multivitamins 10 ml/Chromium/ Copper/Manganese/ 

Seleni/Zn 0.5 ml/ Insulin Human Regular 35 unit/ Potassium Chloride 20 meq/ 

Total Parenteral Nutrition/Amino Acids/Dextrose/ Fat Emulsion Intravenous 1,400 

ml @  58.333 mls/ hr TPN  CONT IV  Last administered on 4/16/20at 22:10;  Start 

4/16/20 at 22:00;  Stop 4/17/20 at 21:59





Active Scripts


Active


Reported


Bisoprolol Fumarate 5 Mg Tablet 10 Mg PO DAILY


Vitals/I & O





Vital Sign - Last 24 Hours








 4/16/20 4/16/20 4/16/20 4/16/20





 09:00 11:58 13:07 14:15


 


Temp    100.8





    100.8


 


Pulse    82


 


Resp 24   22


 


B/P (MAP) 154/81 (105)   140/85 (103)


 


Pulse Ox 100 99 99 


 


O2 Delivery Ventilator Ventilator  Ventilator


 


    





    





 4/16/20 4/16/20 4/16/20 4/16/20





 15:00 15:30 15:49 16:00


 


Pulse 76 79  


 


Resp 21 20  


 


B/P (MAP) 100/54 (69) 77/49 (58)  


 


Pulse Ox   100 


 


O2 Delivery Ventilator Ventilator Ventilator Mechanical Ventilator





 4/16/20 4/16/20 4/16/20 4/16/20





 16:00 16:00 17:00 19:00


 


Pulse  66  82


 


Resp  18  22


 


B/P (MAP)  113/66 (82)  114/64 (81)


 


Pulse Ox    99


 


O2 Delivery Mechanical Ventilator Ventilator Ventilator Ventilator





 4/16/20 4/16/20 4/16/20 4/16/20





 20:00 20:00 20:00 21:00


 


Temp   101.8 





   101.8 


 


Pulse   96 


 


Resp   24 18


 


B/P (MAP)   141/81 (101) 91/49 (63)


 


Pulse Ox 100  99 100


 


O2 Delivery Ventilator Mechanical Ventilator Ventilator Ventilator


 


    





    





 4/16/20 4/16/20 4/16/20 4/17/20





 22:00 22:05 23:01 00:00


 


Pulse 81  80 


 


Resp 18  18 


 


B/P (MAP) 115/68 (84)  101/58 (72) 


 


Pulse Ox 100 100 100 


 


O2 Delivery Ventilator Ventilator Ventilator Mechanical Ventilator





 4/17/20 4/17/20 4/17/20 4/17/20





 00:00 01:00 01:40 02:00


 


Temp 100.9   





 100.9   


 


Pulse 82 81  84


 


Resp 20 18  18


 


B/P (MAP) 102/71 (81) 104/67 (79)  115/63 (80)


 


Pulse Ox 99 99 100 99


 


O2 Delivery Ventilator Ventilator Ventilator Ventilator


 


    





    





 4/17/20 4/17/20 4/17/20 4/17/20





 03:00 03:31 04:00 04:00


 


Temp    98.1





    98.1


 


Pulse 88   91


 


Resp 18   18


 


B/P (MAP) 119/69 (86)   122/70 (87)


 


Pulse Ox 99 100  99


 


O2 Delivery Ventilator Ventilator Mechanical Ventilator Ventilator


 


    





    





 4/17/20 4/17/20 4/17/20 





 05:00 05:33 06:00 


 


Pulse 83  80 


 


Resp 18  18 


 


B/P (MAP) 108/59 (75)  174/82 (112) 


 


Pulse Ox 100 100 100 


 


O2 Delivery Ventilator Ventilator Ventilator 














Intake and Output   


 


 4/16/20 4/16/20 4/17/20





 15:00 23:00 07:00


 


Intake Total  2202 ml 1102.2 ml


 


Output Total 250 ml 485 ml 350 ml


 


Balance -250 ml 1717 ml 752.2 ml











Hemodynamically unstable?:  No


Is patient in severe pain?:  No


Is NPO status required?:  No











GAETANO GONCALVES MD        Apr 17, 2020 09:04

## 2020-04-17 NOTE — PDOC
PULMONARY PROGRESS NOTES


Subjective


Patient intubated on 3/23 , s/p trach AC mode


awake and alert today, S/P paracentisis with 4 liters removed on 4/15/20


Plan for PS today


Vitals





Vital Signs








  Date Time  Temp Pulse Resp B/P (MAP) Pulse Ox O2 Delivery O2 Flow Rate FiO2


 


4/17/20 07:37     100 Ventilator  


 


4/17/20 06:00  80 18 174/82 (112)    


 


4/17/20 04:00 98.1       





 98.1       








ROS:  No Nausea, No Chest Pain, No Abdominal Pain, No Increase Cough


General:  Alert


Lungs:  Other (dimished in BLL)


Cardiovascular:  S1, S2


Abdomen:  Non-tender, Other (distended)


Extremities:  Other (+3 generalized edema )


Skin:  Warm, Dry


Labs





Laboratory Tests








Test


 4/15/20


11:57 4/15/20


12:35 4/15/20


23:48 4/16/20


06:15


 


Glucose (Fingerstick)


 164 mg/dL


(70-99) 


 158 mg/dL


(70-99) 





 


Prothrombin Time


 


 15.6 SEC


(11.7-14.0) 


 





 


Prothromb Time International


Ratio 


 1.3 (0.8-1.1) 


 


 





 


Sodium Level


 


 


 


 139 mmol/L


(136-145)


 


Potassium Level


 


 


 


 3.9 mmol/L


(3.5-5.1)


 


Chloride Level


 


 


 


 102 mmol/L


()


 


Carbon Dioxide Level


 


 


 


 25 mmol/L


(21-32)


 


Anion Gap    12 (6-14) 


 


Blood Urea Nitrogen


 


 


 


 88 mg/dL


(7-20)


 


Creatinine


 


 


 


 1.8 mg/dL


(0.6-1.0)


 


Estimated GFR


(Cockcroft-Gault) 


 


 


 29.9 





 


Glucose Level


 


 


 


 120 mg/dL


(70-99)


 


Calcium Level


 


 


 


 9.1 mg/dL


(8.5-10.1)


 


Phosphorus Level


 


 


 


 5.1 mg/dL


(2.6-4.7)


 


Magnesium Level


 


 


 


 2.0 mg/dL


(1.8-2.4)


 


Test


 4/16/20


06:18 4/16/20


08:00 4/16/20


10:00 4/16/20


19:31


 


Glucose (Fingerstick)


 106 mg/dL


(70-99) 


 


 142 mg/dL


(70-99)


 


O2 Saturation  97 % (92-99)   


 


Arterial Blood pH


 


 7.45


(7.35-7.45) 


 





 


Arterial Blood pH (Temp


corrected) 


 7.43 


 


 





 


Arterial Blood pCO2 at


Patient Temp 


 33 mmHg


(35-46) 


 





 


Arterial Blood pCO2 (Temp


correct) 


 35 mmHg 


 


 





 


Arterial Blood pO2 at Patient


Temp 


 103 mmHg


() 


 





 


Arterial Blood pO2 (Temp


corrected) 


 114 mmHg 


 


 





 


Arterial Blood HCO3


 


 22 mmol/L


(21-28) 


 





 


Arterial Blood Base Excess


 


 -1 mmol/L


(-3-3) 


 





 


FiO2  35% vent   


 


White Blood Count


 


 


 9.2 x10^3/uL


(4.0-11.0) 





 


Red Blood Count


 


 


 2.76 x10^6/uL


(3.50-5.40) 





 


Hemoglobin


 


 


 8.9 g/dL


(12.0-15.5) 





 


Hematocrit


 


 


 26.5 %


(36.0-47.0) 





 


Mean Corpuscular Volume   96 fL ()  


 


Mean Corpuscular Hemoglobin   32 pg (25-35)  


 


Mean Corpuscular Hemoglobin


Concent 


 


 33 g/dL


(31-37) 





 


Red Cell Distribution Width


 


 


 18.9 %


(11.5-14.5) 





 


Platelet Count


 


 


 530 x10^3/uL


(140-400) 





 


Neutrophils (%) (Auto)   66 % (31-73)  


 


Lymphocytes (%) (Auto)   21 % (24-48)  


 


Monocytes (%) (Auto)   12 % (0-9)  


 


Eosinophils (%) (Auto)   0 % (0-3)  


 


Basophils (%) (Auto)   1 % (0-3)  


 


Neutrophils # (Auto)


 


 


 6.1 x10^3/uL


(1.8-7.7) 





 


Lymphocytes # (Auto)


 


 


 2.0 x10^3/uL


(1.0-4.8) 





 


Monocytes # (Auto)


 


 


 1.1 x10^3/uL


(0.0-1.1) 





 


Eosinophils # (Auto)


 


 


 0.0 x10^3/uL


(0.0-0.7) 





 


Basophils # (Auto)


 


 


 0.0 x10^3/uL


(0.0-0.2) 





 


Test


 4/16/20


23:53 4/17/20


06:00 4/17/20


06:06 4/17/20


07:00


 


Glucose (Fingerstick)


 145 mg/dL


(70-99) 


 129 mg/dL


(70-99) 





 


Sodium Level


 


 140 mmol/L


(136-145) 


 





 


Potassium Level


 


 3.7 mmol/L


(3.5-5.1) 


 





 


Chloride Level


 


 103 mmol/L


() 


 





 


Carbon Dioxide Level


 


 26 mmol/L


(21-32) 


 





 


Anion Gap  11 (6-14)   


 


Blood Urea Nitrogen


 


 80 mg/dL


(7-20) 


 





 


Creatinine


 


 1.6 mg/dL


(0.6-1.0) 


 





 


Estimated GFR


(Cockcroft-Gault) 


 34.3 


 


 





 


BUN/Creatinine Ratio  50 (6-20)   


 


Glucose Level


 


 130 mg/dL


(70-99) 


 





 


Calcium Level


 


 8.4 mg/dL


(8.5-10.1) 


 





 


Phosphorus Level


 


 5.2 mg/dL


(2.6-4.7) 


 





 


Total Bilirubin


 


 0.4 mg/dL


(0.2-1.0) 


 





 


Aspartate Amino Transf


(AST/SGOT) 


 23 U/L (15-37) 


 


 





 


Alanine Aminotransferase


(ALT/SGPT) 


 12 U/L (14-59) 


 


 





 


Alkaline Phosphatase


 


 52 U/L


() 


 





 


Total Protein


 


 4.9 g/dL


(6.4-8.2) 


 





 


Albumin


 


 2.6 g/dL


(3.4-5.0) 


 





 


Albumin/Globulin Ratio  1.1 (1.0-1.7)   


 


White Blood Count


 


 


 


 5.8 x10^3/uL


(4.0-11.0)


 


Red Blood Count


 


 


 


 2.40 x10^6/uL


(3.50-5.40)


 


Hemoglobin


 


 


 


 7.6 g/dL


(12.0-15.5)


 


Hematocrit


 


 


 


 22.8 %


(36.0-47.0)


 


Mean Corpuscular Volume    95 fL () 


 


Mean Corpuscular Hemoglobin    32 pg (25-35) 


 


Mean Corpuscular Hemoglobin


Concent 


 


 


 33 g/dL


(31-37)


 


Red Cell Distribution Width


 


 


 


 18.4 %


(11.5-14.5)


 


Platelet Count


 


 


 


 498 x10^3/uL


(140-400)


 


Neutrophils (%) (Auto)    67 % (31-73) 


 


Lymphocytes (%) (Auto)    17 % (24-48) 


 


Monocytes (%) (Auto)    14 % (0-9) 


 


Eosinophils (%) (Auto)    1 % (0-3) 


 


Basophils (%) (Auto)    1 % (0-3) 


 


Neutrophils # (Auto)


 


 


 


 3.9 x10^3/uL


(1.8-7.7)


 


Lymphocytes # (Auto)


 


 


 


 1.0 x10^3/uL


(1.0-4.8)


 


Monocytes # (Auto)


 


 


 


 0.8 x10^3/uL


(0.0-1.1)


 


Eosinophils # (Auto)


 


 


 


 0.0 x10^3/uL


(0.0-0.7)


 


Basophils # (Auto)


 


 


 


 0.0 x10^3/uL


(0.0-0.2)


 


Test


 4/17/20


08:00 


 


 





 


O2 Saturation 98 % (92-99)    


 


Arterial Blood pH


 7.47


(7.35-7.45) 


 


 





 


Arterial Blood pCO2 at


Patient Temp 33 mmHg


(35-46) 


 


 





 


Arterial Blood pO2 at Patient


Temp 136 mmHg


() 


 


 





 


Arterial Blood HCO3


 24 mmol/L


(21-28) 


 


 





 


Arterial Blood Base Excess


 0 mmol/L


(-3-3) 


 


 





 


FiO2 35    








Laboratory Tests








Test


 4/16/20


10:00 4/16/20


19:31 4/16/20


23:53 4/17/20


06:00


 


White Blood Count


 9.2 x10^3/uL


(4.0-11.0) 


 


 





 


Red Blood Count


 2.76 x10^6/uL


(3.50-5.40) 


 


 





 


Hemoglobin


 8.9 g/dL


(12.0-15.5) 


 


 





 


Hematocrit


 26.5 %


(36.0-47.0) 


 


 





 


Mean Corpuscular Volume 96 fL ()    


 


Mean Corpuscular Hemoglobin 32 pg (25-35)    


 


Mean Corpuscular Hemoglobin


Concent 33 g/dL


(31-37) 


 


 





 


Red Cell Distribution Width


 18.9 %


(11.5-14.5) 


 


 





 


Platelet Count


 530 x10^3/uL


(140-400) 


 


 





 


Neutrophils (%) (Auto) 66 % (31-73)    


 


Lymphocytes (%) (Auto) 21 % (24-48)    


 


Monocytes (%) (Auto) 12 % (0-9)    


 


Eosinophils (%) (Auto) 0 % (0-3)    


 


Basophils (%) (Auto) 1 % (0-3)    


 


Neutrophils # (Auto)


 6.1 x10^3/uL


(1.8-7.7) 


 


 





 


Lymphocytes # (Auto)


 2.0 x10^3/uL


(1.0-4.8) 


 


 





 


Monocytes # (Auto)


 1.1 x10^3/uL


(0.0-1.1) 


 


 





 


Eosinophils # (Auto)


 0.0 x10^3/uL


(0.0-0.7) 


 


 





 


Basophils # (Auto)


 0.0 x10^3/uL


(0.0-0.2) 


 


 





 


Glucose (Fingerstick)


 


 142 mg/dL


(70-99) 145 mg/dL


(70-99) 





 


Sodium Level


 


 


 


 140 mmol/L


(136-145)


 


Potassium Level


 


 


 


 3.7 mmol/L


(3.5-5.1)


 


Chloride Level


 


 


 


 103 mmol/L


()


 


Carbon Dioxide Level


 


 


 


 26 mmol/L


(21-32)


 


Anion Gap    11 (6-14) 


 


Blood Urea Nitrogen


 


 


 


 80 mg/dL


(7-20)


 


Creatinine


 


 


 


 1.6 mg/dL


(0.6-1.0)


 


Estimated GFR


(Cockcroft-Gault) 


 


 


 34.3 





 


BUN/Creatinine Ratio    50 (6-20) 


 


Glucose Level


 


 


 


 130 mg/dL


(70-99)


 


Calcium Level


 


 


 


 8.4 mg/dL


(8.5-10.1)


 


Phosphorus Level


 


 


 


 5.2 mg/dL


(2.6-4.7)


 


Total Bilirubin


 


 


 


 0.4 mg/dL


(0.2-1.0)


 


Aspartate Amino Transf


(AST/SGOT) 


 


 


 23 U/L (15-37) 





 


Alanine Aminotransferase


(ALT/SGPT) 


 


 


 12 U/L (14-59) 





 


Alkaline Phosphatase


 


 


 


 52 U/L


()


 


Total Protein


 


 


 


 4.9 g/dL


(6.4-8.2)


 


Albumin


 


 


 


 2.6 g/dL


(3.4-5.0)


 


Albumin/Globulin Ratio    1.1 (1.0-1.7) 


 


Test


 4/17/20


06:06 4/17/20


07:00 4/17/20


08:00 





 


Glucose (Fingerstick)


 129 mg/dL


(70-99) 


 


 





 


White Blood Count


 


 5.8 x10^3/uL


(4.0-11.0) 


 





 


Red Blood Count


 


 2.40 x10^6/uL


(3.50-5.40) 


 





 


Hemoglobin


 


 7.6 g/dL


(12.0-15.5) 


 





 


Hematocrit


 


 22.8 %


(36.0-47.0) 


 





 


Mean Corpuscular Volume  95 fL ()   


 


Mean Corpuscular Hemoglobin  32 pg (25-35)   


 


Mean Corpuscular Hemoglobin


Concent 


 33 g/dL


(31-37) 


 





 


Red Cell Distribution Width


 


 18.4 %


(11.5-14.5) 


 





 


Platelet Count


 


 498 x10^3/uL


(140-400) 


 





 


Neutrophils (%) (Auto)  67 % (31-73)   


 


Lymphocytes (%) (Auto)  17 % (24-48)   


 


Monocytes (%) (Auto)  14 % (0-9)   


 


Eosinophils (%) (Auto)  1 % (0-3)   


 


Basophils (%) (Auto)  1 % (0-3)   


 


Neutrophils # (Auto)


 


 3.9 x10^3/uL


(1.8-7.7) 


 





 


Lymphocytes # (Auto)


 


 1.0 x10^3/uL


(1.0-4.8) 


 





 


Monocytes # (Auto)


 


 0.8 x10^3/uL


(0.0-1.1) 


 





 


Eosinophils # (Auto)


 


 0.0 x10^3/uL


(0.0-0.7) 


 





 


Basophils # (Auto)


 


 0.0 x10^3/uL


(0.0-0.2) 


 





 


O2 Saturation   98 % (92-99)  


 


Arterial Blood pH


 


 


 7.47


(7.35-7.45) 





 


Arterial Blood pCO2 at


Patient Temp 


 


 33 mmHg


(35-46) 





 


Arterial Blood pO2 at Patient


Temp 


 


 136 mmHg


() 





 


Arterial Blood HCO3


 


 


 24 mmol/L


(21-28) 





 


Arterial Blood Base Excess


 


 


 0 mmol/L


(-3-3) 





 


FiO2   35  








Medications





Active Scripts








 Medications  Dose


 Route/Sig


 Max Daily Dose Days Date Category


 


 Bisoprolol


 Fumarate 5 Mg


 Tablet  10 Mg


 PO DAILY


   3/16/20 Reported











Impression


.


IMPRESSION:


1.  Acute hypoxemic respiratory failure secondary to ARDS status post trach,


2.  Gallstone pancreatitis.with NECROSIS, NOT A SURGICAL CANDIDATE


3.  Severe metabolic acidosis.stable


4.  Acute kidney injury-stable, ON HD-- continue to improve 


5.  Acute gallstone pancreatitis.


6.  Hypoalbuminemia.


7.  Moderate persistent effusions


8.  Fever-resolved 


9.  Chronic anemia


10. Covid 19 testing negative


11. Moderate to large ascites-S/P paracentisis


1





Plan


.


Cont. ventilatory support, ABG reviewed 35%/PEEP 5


CPAP trial today if tolerates attempt t-shield -- need aggressive vent weaning 


Place back on A/C mod at HS


Follow surgery recs


Follow ID rec


Follow nephrology recs 


Tolerated paracentesis well removed 4 liters on 4./15/20


continue TPN for nutrition 





DVT/GI PPX 


d/w RN/RT








CC time : 30 minutes reviewing labs, patient care and discussing will care team











SHARYN SOLORZANO MD                 Apr 17, 2020 09:20

## 2020-04-17 NOTE — PDOC
G I PROGRESS NOTE


Subjective


Awake, alert.  Can't vocalize and I don't read lips well.  Know she has 

questions.


Physical Exam


Lungs clear anteriorly.


RRR


Abdomen soft, distended.


Review of Relevant


I have reviewed the following items josy (where applicable) has been applied.


Labs





Laboratory Tests








Test


 4/15/20


23:48 4/16/20


06:15 4/16/20


06:18 4/16/20


08:00


 


Glucose (Fingerstick)


 158 mg/dL


(70-99) 


 106 mg/dL


(70-99) 





 


Sodium Level


 


 139 mmol/L


(136-145) 


 





 


Potassium Level


 


 3.9 mmol/L


(3.5-5.1) 


 





 


Chloride Level


 


 102 mmol/L


() 


 





 


Carbon Dioxide Level


 


 25 mmol/L


(21-32) 


 





 


Anion Gap  12 (6-14)   


 


Blood Urea Nitrogen


 


 88 mg/dL


(7-20) 


 





 


Creatinine


 


 1.8 mg/dL


(0.6-1.0) 


 





 


Estimated GFR


(Cockcroft-Gault) 


 29.9 


 


 





 


Glucose Level


 


 120 mg/dL


(70-99) 


 





 


Calcium Level


 


 9.1 mg/dL


(8.5-10.1) 


 





 


Phosphorus Level


 


 5.1 mg/dL


(2.6-4.7) 


 





 


Magnesium Level


 


 2.0 mg/dL


(1.8-2.4) 


 





 


O2 Saturation    97 % (92-99) 


 


Arterial Blood pH


 


 


 


 7.45


(7.35-7.45)


 


Arterial Blood pH (Temp


corrected) 


 


 


 7.43 





 


Arterial Blood pCO2 at


Patient Temp 


 


 


 33 mmHg


(35-46)


 


Arterial Blood pCO2 (Temp


correct) 


 


 


 35 mmHg 





 


Arterial Blood pO2 at Patient


Temp 


 


 


 103 mmHg


()


 


Arterial Blood pO2 (Temp


corrected) 


 


 


 114 mmHg 





 


Arterial Blood HCO3


 


 


 


 22 mmol/L


(21-28)


 


Arterial Blood Base Excess


 


 


 


 -1 mmol/L


(-3-3)


 


FiO2    35% vent 


 


Test


 4/16/20


10:00 4/16/20


19:31 4/16/20


23:53 4/17/20


06:00


 


White Blood Count


 9.2 x10^3/uL


(4.0-11.0) 


 


 





 


Red Blood Count


 2.76 x10^6/uL


(3.50-5.40) 


 


 





 


Hemoglobin


 8.9 g/dL


(12.0-15.5) 


 


 





 


Hematocrit


 26.5 %


(36.0-47.0) 


 


 





 


Mean Corpuscular Volume 96 fL ()    


 


Mean Corpuscular Hemoglobin 32 pg (25-35)    


 


Mean Corpuscular Hemoglobin


Concent 33 g/dL


(31-37) 


 


 





 


Red Cell Distribution Width


 18.9 %


(11.5-14.5) 


 


 





 


Platelet Count


 530 x10^3/uL


(140-400) 


 


 





 


Neutrophils (%) (Auto) 66 % (31-73)    


 


Lymphocytes (%) (Auto) 21 % (24-48)    


 


Monocytes (%) (Auto) 12 % (0-9)    


 


Eosinophils (%) (Auto) 0 % (0-3)    


 


Basophils (%) (Auto) 1 % (0-3)    


 


Neutrophils # (Auto)


 6.1 x10^3/uL


(1.8-7.7) 


 


 





 


Lymphocytes # (Auto)


 2.0 x10^3/uL


(1.0-4.8) 


 


 





 


Monocytes # (Auto)


 1.1 x10^3/uL


(0.0-1.1) 


 


 





 


Eosinophils # (Auto)


 0.0 x10^3/uL


(0.0-0.7) 


 


 





 


Basophils # (Auto)


 0.0 x10^3/uL


(0.0-0.2) 


 


 





 


Glucose (Fingerstick)


 


 142 mg/dL


(70-99) 145 mg/dL


(70-99) 





 


Sodium Level


 


 


 


 140 mmol/L


(136-145)


 


Potassium Level


 


 


 


 3.7 mmol/L


(3.5-5.1)


 


Chloride Level


 


 


 


 103 mmol/L


()


 


Carbon Dioxide Level


 


 


 


 26 mmol/L


(21-32)


 


Anion Gap    11 (6-14) 


 


Blood Urea Nitrogen


 


 


 


 80 mg/dL


(7-20)


 


Creatinine


 


 


 


 1.6 mg/dL


(0.6-1.0)


 


Estimated GFR


(Cockcroft-Gault) 


 


 


 34.3 





 


BUN/Creatinine Ratio    50 (6-20) 


 


Glucose Level


 


 


 


 130 mg/dL


(70-99)


 


Calcium Level


 


 


 


 8.4 mg/dL


(8.5-10.1)


 


Phosphorus Level


 


 


 


 5.2 mg/dL


(2.6-4.7)


 


Total Bilirubin


 


 


 


 0.4 mg/dL


(0.2-1.0)


 


Aspartate Amino Transf


(AST/SGOT) 


 


 


 23 U/L (15-37) 





 


Alanine Aminotransferase


(ALT/SGPT) 


 


 


 12 U/L (14-59) 





 


Alkaline Phosphatase


 


 


 


 52 U/L


()


 


Total Protein


 


 


 


 4.9 g/dL


(6.4-8.2)


 


Albumin


 


 


 


 2.6 g/dL


(3.4-5.0)


 


Albumin/Globulin Ratio    1.1 (1.0-1.7) 


 


Test


 4/17/20


06:06 4/17/20


07:00 4/17/20


08:00 4/17/20


13:08


 


Glucose (Fingerstick)


 129 mg/dL


(70-99) 


 


 181 mg/dL


(70-99)


 


White Blood Count


 


 5.8 x10^3/uL


(4.0-11.0) 


 





 


Red Blood Count


 


 2.40 x10^6/uL


(3.50-5.40) 


 





 


Hemoglobin


 


 7.6 g/dL


(12.0-15.5) 


 





 


Hematocrit


 


 22.8 %


(36.0-47.0) 


 





 


Mean Corpuscular Volume  95 fL ()   


 


Mean Corpuscular Hemoglobin  32 pg (25-35)   


 


Mean Corpuscular Hemoglobin


Concent 


 33 g/dL


(31-37) 


 





 


Red Cell Distribution Width


 


 18.4 %


(11.5-14.5) 


 





 


Platelet Count


 


 498 x10^3/uL


(140-400) 


 





 


Neutrophils (%) (Auto)  67 % (31-73)   


 


Lymphocytes (%) (Auto)  17 % (24-48)   


 


Monocytes (%) (Auto)  14 % (0-9)   


 


Eosinophils (%) (Auto)  1 % (0-3)   


 


Basophils (%) (Auto)  1 % (0-3)   


 


Neutrophils # (Auto)


 


 3.9 x10^3/uL


(1.8-7.7) 


 





 


Lymphocytes # (Auto)


 


 1.0 x10^3/uL


(1.0-4.8) 


 





 


Monocytes # (Auto)


 


 0.8 x10^3/uL


(0.0-1.1) 


 





 


Eosinophils # (Auto)


 


 0.0 x10^3/uL


(0.0-0.7) 


 





 


Basophils # (Auto)


 


 0.0 x10^3/uL


(0.0-0.2) 


 





 


O2 Saturation   98 % (92-99)  


 


Arterial Blood pH


 


 


 7.47


(7.35-7.45) 





 


Arterial Blood pCO2 at


Patient Temp 


 


 33 mmHg


(35-46) 





 


Arterial Blood pO2 at Patient


Temp 


 


 136 mmHg


() 





 


Arterial Blood HCO3


 


 


 24 mmol/L


(21-28) 





 


Arterial Blood Base Excess


 


 


 0 mmol/L


(-3-3) 





 


FiO2   35  








Laboratory Tests








Test


 4/16/20


19:31 4/16/20


23:53 4/17/20


06:00 4/17/20


06:06


 


Glucose (Fingerstick)


 142 mg/dL


(70-99) 145 mg/dL


(70-99) 


 129 mg/dL


(70-99)


 


Sodium Level


 


 


 140 mmol/L


(136-145) 





 


Potassium Level


 


 


 3.7 mmol/L


(3.5-5.1) 





 


Chloride Level


 


 


 103 mmol/L


() 





 


Carbon Dioxide Level


 


 


 26 mmol/L


(21-32) 





 


Anion Gap   11 (6-14)  


 


Blood Urea Nitrogen


 


 


 80 mg/dL


(7-20) 





 


Creatinine


 


 


 1.6 mg/dL


(0.6-1.0) 





 


Estimated GFR


(Cockcroft-Gault) 


 


 34.3 


 





 


BUN/Creatinine Ratio   50 (6-20)  


 


Glucose Level


 


 


 130 mg/dL


(70-99) 





 


Calcium Level


 


 


 8.4 mg/dL


(8.5-10.1) 





 


Phosphorus Level


 


 


 5.2 mg/dL


(2.6-4.7) 





 


Total Bilirubin


 


 


 0.4 mg/dL


(0.2-1.0) 





 


Aspartate Amino Transf


(AST/SGOT) 


 


 23 U/L (15-37) 


 





 


Alanine Aminotransferase


(ALT/SGPT) 


 


 12 U/L (14-59) 


 





 


Alkaline Phosphatase


 


 


 52 U/L


() 





 


Total Protein


 


 


 4.9 g/dL


(6.4-8.2) 





 


Albumin


 


 


 2.6 g/dL


(3.4-5.0) 





 


Albumin/Globulin Ratio   1.1 (1.0-1.7)  


 


Test


 4/17/20


07:00 4/17/20


08:00 4/17/20


13:08 





 


White Blood Count


 5.8 x10^3/uL


(4.0-11.0) 


 


 





 


Red Blood Count


 2.40 x10^6/uL


(3.50-5.40) 


 


 





 


Hemoglobin


 7.6 g/dL


(12.0-15.5) 


 


 





 


Hematocrit


 22.8 %


(36.0-47.0) 


 


 





 


Mean Corpuscular Volume 95 fL ()    


 


Mean Corpuscular Hemoglobin 32 pg (25-35)    


 


Mean Corpuscular Hemoglobin


Concent 33 g/dL


(31-37) 


 


 





 


Red Cell Distribution Width


 18.4 %


(11.5-14.5) 


 


 





 


Platelet Count


 498 x10^3/uL


(140-400) 


 


 





 


Neutrophils (%) (Auto) 67 % (31-73)    


 


Lymphocytes (%) (Auto) 17 % (24-48)    


 


Monocytes (%) (Auto) 14 % (0-9)    


 


Eosinophils (%) (Auto) 1 % (0-3)    


 


Basophils (%) (Auto) 1 % (0-3)    


 


Neutrophils # (Auto)


 3.9 x10^3/uL


(1.8-7.7) 


 


 





 


Lymphocytes # (Auto)


 1.0 x10^3/uL


(1.0-4.8) 


 


 





 


Monocytes # (Auto)


 0.8 x10^3/uL


(0.0-1.1) 


 


 





 


Eosinophils # (Auto)


 0.0 x10^3/uL


(0.0-0.7) 


 


 





 


Basophils # (Auto)


 0.0 x10^3/uL


(0.0-0.2) 


 


 





 


O2 Saturation  98 % (92-99)   


 


Arterial Blood pH


 


 7.47


(7.35-7.45) 


 





 


Arterial Blood pCO2 at


Patient Temp 


 33 mmHg


(35-46) 


 





 


Arterial Blood pO2 at Patient


Temp 


 136 mmHg


() 


 





 


Arterial Blood HCO3


 


 24 mmol/L


(21-28) 


 





 


Arterial Blood Base Excess


 


 0 mmol/L


(-3-3) 


 





 


FiO2  35   


 


Glucose (Fingerstick)


 


 


 181 mg/dL


(70-99) 











Microbiology


4/15/20 Aerobic and Anaerobic Culture, Resulted


          Pending


4/15/20 Anaerobic Culture Result 1 ([facility]), Resulted


          Pending


4/15/20 Aerobic Culture, Resulted


          Pending


4/15/20 Aerobic Culture Result 1 ([facility]), Resulted


          Pending


4/15/20 Gram Stain - Final, Resulted


          


4/15/20 Gram Stain Result 1 ([facility]) - Final, Resulted


          


4/15/20 Gram Stain Result 2 ([facility]) - Final, Resulted


          


4/14/20 Blood Culture - Preliminary, Resulted


          NO GROWTH AFTER 2 DAYS


4/12/20 Urine Culture - Final, Complete


          


4/12/20 Urine Culture Result 1 ([facility]) - Final, Complete


Vitals/I & O





Vital Sign - Last 24 Hours








 4/16/20 4/16/20 4/16/20 4/16/20





 14:15 15:00 15:30 15:49


 


Temp 100.8   





 100.8   


 


Pulse 82 76 79 


 


Resp 22 21 20 


 


B/P (MAP) 140/85 (103) 100/54 (69) 77/49 (58) 


 


Pulse Ox    100


 


O2 Delivery Ventilator Ventilator Ventilator Ventilator


 


    





    





 4/16/20 4/16/20 4/16/20 4/16/20





 16:00 16:00 16:00 17:00


 


Pulse   66 


 


Resp   18 


 


B/P (MAP)   113/66 (82) 


 


O2 Delivery Mechanical Ventilator Mechanical Ventilator Ventilator Ventilator





 4/16/20 4/16/20 4/16/20 4/16/20





 19:00 20:00 20:00 20:00


 


Temp    101.8





    101.8


 


Pulse 82   96


 


Resp 22   24


 


B/P (MAP) 114/64 (81)   141/81 (101)


 


Pulse Ox 99 100  99


 


O2 Delivery Ventilator Ventilator Mechanical Ventilator Ventilator


 


    





    





 4/16/20 4/16/20 4/16/20 4/16/20





 21:00 22:00 22:05 23:01


 


Pulse  81  80


 


Resp 18 18  18


 


B/P (MAP) 91/49 (63) 115/68 (84)  101/58 (72)


 


Pulse Ox 100 100 100 100


 


O2 Delivery Ventilator Ventilator Ventilator Ventilator





 4/17/20 4/17/20 4/17/20 4/17/20





 00:00 00:00 01:00 01:40


 


Temp  100.9  





  100.9  


 


Pulse  82 81 


 


Resp  20 18 


 


B/P (MAP)  102/71 (81) 104/67 (79) 


 


Pulse Ox  99 99 100


 


O2 Delivery Mechanical Ventilator Ventilator Ventilator Ventilator


 


    





    





 4/17/20 4/17/20 4/17/20 4/17/20





 02:00 03:00 03:31 04:00


 


Pulse 84 88  


 


Resp 18 18  


 


B/P (MAP) 115/63 (80) 119/69 (86)  


 


Pulse Ox 99 99 100 


 


O2 Delivery Ventilator Ventilator Ventilator Mechanical Ventilator





 4/17/20 4/17/20 4/17/20 4/17/20





 04:00 05:00 05:33 06:00


 


Temp 98.1   





 98.1   


 


Pulse 91 83  80


 


Resp 18 18  18


 


B/P (MAP) 122/70 (87) 108/59 (75)  174/82 (112)


 


Pulse Ox 99 100 100 100


 


O2 Delivery Ventilator Ventilator Ventilator Ventilator


 


    





    





 4/17/20 4/17/20 4/17/20 4/17/20





 07:37 08:00 10:51 12:00


 


Pulse Ox 100  100 


 


O2 Delivery Ventilator Mechanical Ventilator Ventilator Mechanical Ventilator














Intake and Output   


 


 4/16/20 4/16/20 4/17/20





 15:00 23:00 07:00


 


Intake Total  2202 ml 1102.2 ml


 


Output Total 250 ml 485 ml 350 ml


 


Balance -250 ml 1717 ml 752.2 ml











Has been making more urine.


Problem List


Problems


Medical Problems:


(1) Acute pancreatitis


Status: Acute  





(2) Cholelithiasis


Status: Acute  





Assessment


Biliary pancreatitis, severe/necrotizing with [facility].  Some things seem some 

better as now making urine.


Plan of Care Note


Continue support.





Off the weekend.  Coverage available if needed.





Hemodynamically unstable?:  No


Is patient in severe pain?:  No


Is NPO status required?:  Yes











MARCO ANTONIO LONGO MD          Apr 17, 2020 14:18

## 2020-04-17 NOTE — PDOC
PROGRESS NOTES


Assessment


Problems


Medical Problems:


(1) Acute pancreatitis


Status: Acute  





(2) Cholelithiasis


Status: Acute  





Respiratory failure.


Seizure.


Metabolic encephalopathy.


Fevers.


Metabolic acidosis.


Diffuse pulmonary infiltrate.


Pleural effusion.


Pancreatitis, necrotizing.


Gallstone.


Leukocytosis.


Lymphopenia.


Electrolytes imbalances.


Hyperglycemia.


DM.


HTN.


HLD.


Anemia.


Abnormal CXR.


Obesity.


Ascites and pleural effusion


Plan


Keppra if she has further seizures.


Treat medical diseases.


Subjective


NG tube is uncomfortable


Objective





Vital Signs








  Date Time  Temp Pulse Resp B/P (MAP) Pulse Ox O2 Delivery O2 Flow Rate FiO2


 


4/17/20 07:37     100 Ventilator  


 


4/17/20 06:00  80 18 174/82 (112)    


 


4/17/20 04:00 98.1       





 98.1       














Intake and Output 


 


 4/17/20





 07:00


 


Intake Total 3304.2 ml


 


Output Total 1085 ml


 


Balance 2219.2 ml


 


 


 


IV Total 3304.2 ml


 


Output Urine Total 1085 ml








PHYSICAL EXAM


Alert.  Tracheostomy in place. Mouths replies. Tracks with her eyes, moves 

extremities to commands


PERRL.


EOMI.


CN: no focal findings.


Muscle tone: normal.


Muscle strength: Moves all extremities, 3/5 strength in arms, 2/5 and legs


DTR: 1+


Plantar reflex: Silent


Gait: not examined in bed.


Sensory exam: no abnormal findings.


No cerebellar signs elicited.


Review of Relevant


I have reviewed the following items josy (where applicable) has been applied.


Labs





Laboratory Tests








Test


 4/15/20


11:57 4/15/20


12:35 4/15/20


23:48 4/16/20


06:15


 


Glucose (Fingerstick)


 164 mg/dL


(70-99) 


 158 mg/dL


(70-99) 





 


Prothrombin Time


 


 15.6 SEC


(11.7-14.0) 


 





 


Prothromb Time International


Ratio 


 1.3 (0.8-1.1) 


 


 





 


Sodium Level


 


 


 


 139 mmol/L


(136-145)


 


Potassium Level


 


 


 


 3.9 mmol/L


(3.5-5.1)


 


Chloride Level


 


 


 


 102 mmol/L


()


 


Carbon Dioxide Level


 


 


 


 25 mmol/L


(21-32)


 


Anion Gap    12 (6-14) 


 


Blood Urea Nitrogen


 


 


 


 88 mg/dL


(7-20)


 


Creatinine


 


 


 


 1.8 mg/dL


(0.6-1.0)


 


Estimated GFR


(Cockcroft-Gault) 


 


 


 29.9 





 


Glucose Level


 


 


 


 120 mg/dL


(70-99)


 


Calcium Level


 


 


 


 9.1 mg/dL


(8.5-10.1)


 


Phosphorus Level


 


 


 


 5.1 mg/dL


(2.6-4.7)


 


Magnesium Level


 


 


 


 2.0 mg/dL


(1.8-2.4)


 


Test


 4/16/20


06:18 4/16/20


08:00 4/16/20


10:00 4/16/20


19:31


 


Glucose (Fingerstick)


 106 mg/dL


(70-99) 


 


 142 mg/dL


(70-99)


 


O2 Saturation  97 % (92-99)   


 


Arterial Blood pH


 


 7.45


(7.35-7.45) 


 





 


Arterial Blood pH (Temp


corrected) 


 7.43 


 


 





 


Arterial Blood pCO2 at


Patient Temp 


 33 mmHg


(35-46) 


 





 


Arterial Blood pCO2 (Temp


correct) 


 35 mmHg 


 


 





 


Arterial Blood pO2 at Patient


Temp 


 103 mmHg


() 


 





 


Arterial Blood pO2 (Temp


corrected) 


 114 mmHg 


 


 





 


Arterial Blood HCO3


 


 22 mmol/L


(21-28) 


 





 


Arterial Blood Base Excess


 


 -1 mmol/L


(-3-3) 


 





 


FiO2  35% vent   


 


White Blood Count


 


 


 9.2 x10^3/uL


(4.0-11.0) 





 


Red Blood Count


 


 


 2.76 x10^6/uL


(3.50-5.40) 





 


Hemoglobin


 


 


 8.9 g/dL


(12.0-15.5) 





 


Hematocrit


 


 


 26.5 %


(36.0-47.0) 





 


Mean Corpuscular Volume   96 fL ()  


 


Mean Corpuscular Hemoglobin   32 pg (25-35)  


 


Mean Corpuscular Hemoglobin


Concent 


 


 33 g/dL


(31-37) 





 


Red Cell Distribution Width


 


 


 18.9 %


(11.5-14.5) 





 


Platelet Count


 


 


 530 x10^3/uL


(140-400) 





 


Neutrophils (%) (Auto)   66 % (31-73)  


 


Lymphocytes (%) (Auto)   21 % (24-48)  


 


Monocytes (%) (Auto)   12 % (0-9)  


 


Eosinophils (%) (Auto)   0 % (0-3)  


 


Basophils (%) (Auto)   1 % (0-3)  


 


Neutrophils # (Auto)


 


 


 6.1 x10^3/uL


(1.8-7.7) 





 


Lymphocytes # (Auto)


 


 


 2.0 x10^3/uL


(1.0-4.8) 





 


Monocytes # (Auto)


 


 


 1.1 x10^3/uL


(0.0-1.1) 





 


Eosinophils # (Auto)


 


 


 0.0 x10^3/uL


(0.0-0.7) 





 


Basophils # (Auto)


 


 


 0.0 x10^3/uL


(0.0-0.2) 





 


Test


 4/16/20


23:53 4/17/20


06:00 4/17/20


06:06 4/17/20


07:00


 


Glucose (Fingerstick)


 145 mg/dL


(70-99) 


 129 mg/dL


(70-99) 





 


Sodium Level


 


 140 mmol/L


(136-145) 


 





 


Potassium Level


 


 3.7 mmol/L


(3.5-5.1) 


 





 


Chloride Level


 


 103 mmol/L


() 


 





 


Carbon Dioxide Level


 


 26 mmol/L


(21-32) 


 





 


Anion Gap  11 (6-14)   


 


Blood Urea Nitrogen


 


 80 mg/dL


(7-20) 


 





 


Creatinine


 


 1.6 mg/dL


(0.6-1.0) 


 





 


Estimated GFR


(Cockcroft-Gault) 


 34.3 


 


 





 


BUN/Creatinine Ratio  50 (6-20)   


 


Glucose Level


 


 130 mg/dL


(70-99) 


 





 


Calcium Level


 


 8.4 mg/dL


(8.5-10.1) 


 





 


Phosphorus Level


 


 5.2 mg/dL


(2.6-4.7) 


 





 


Total Bilirubin


 


 0.4 mg/dL


(0.2-1.0) 


 





 


Aspartate Amino Transf


(AST/SGOT) 


 23 U/L (15-37) 


 


 





 


Alanine Aminotransferase


(ALT/SGPT) 


 12 U/L (14-59) 


 


 





 


Alkaline Phosphatase


 


 52 U/L


() 


 





 


Total Protein


 


 4.9 g/dL


(6.4-8.2) 


 





 


Albumin


 


 2.6 g/dL


(3.4-5.0) 


 





 


Albumin/Globulin Ratio  1.1 (1.0-1.7)   


 


White Blood Count


 


 


 


 5.8 x10^3/uL


(4.0-11.0)


 


Red Blood Count


 


 


 


 2.40 x10^6/uL


(3.50-5.40)


 


Hemoglobin


 


 


 


 7.6 g/dL


(12.0-15.5)


 


Hematocrit


 


 


 


 22.8 %


(36.0-47.0)


 


Mean Corpuscular Volume    95 fL () 


 


Mean Corpuscular Hemoglobin    32 pg (25-35) 


 


Mean Corpuscular Hemoglobin


Concent 


 


 


 33 g/dL


(31-37)


 


Red Cell Distribution Width


 


 


 


 18.4 %


(11.5-14.5)


 


Platelet Count


 


 


 


 498 x10^3/uL


(140-400)


 


Neutrophils (%) (Auto)    67 % (31-73) 


 


Lymphocytes (%) (Auto)    17 % (24-48) 


 


Monocytes (%) (Auto)    14 % (0-9) 


 


Eosinophils (%) (Auto)    1 % (0-3) 


 


Basophils (%) (Auto)    1 % (0-3) 


 


Neutrophils # (Auto)


 


 


 


 3.9 x10^3/uL


(1.8-7.7)


 


Lymphocytes # (Auto)


 


 


 


 1.0 x10^3/uL


(1.0-4.8)


 


Monocytes # (Auto)


 


 


 


 0.8 x10^3/uL


(0.0-1.1)


 


Eosinophils # (Auto)


 


 


 


 0.0 x10^3/uL


(0.0-0.7)


 


Basophils # (Auto)


 


 


 


 0.0 x10^3/uL


(0.0-0.2)


 


Test


 4/17/20


08:00 


 


 





 


O2 Saturation 98 % (92-99)    


 


Arterial Blood pH


 7.47


(7.35-7.45) 


 


 





 


Arterial Blood pCO2 at


Patient Temp 33 mmHg


(35-46) 


 


 





 


Arterial Blood pO2 at Patient


Temp 136 mmHg


() 


 


 





 


Arterial Blood HCO3


 24 mmol/L


(21-28) 


 


 





 


Arterial Blood Base Excess


 0 mmol/L


(-3-3) 


 


 





 


FiO2 35    








Laboratory Tests








Test


 4/16/20


10:00 4/16/20


19:31 4/16/20


23:53 4/17/20


06:00


 


White Blood Count


 9.2 x10^3/uL


(4.0-11.0) 


 


 





 


Red Blood Count


 2.76 x10^6/uL


(3.50-5.40) 


 


 





 


Hemoglobin


 8.9 g/dL


(12.0-15.5) 


 


 





 


Hematocrit


 26.5 %


(36.0-47.0) 


 


 





 


Mean Corpuscular Volume 96 fL ()    


 


Mean Corpuscular Hemoglobin 32 pg (25-35)    


 


Mean Corpuscular Hemoglobin


Concent 33 g/dL


(31-37) 


 


 





 


Red Cell Distribution Width


 18.9 %


(11.5-14.5) 


 


 





 


Platelet Count


 530 x10^3/uL


(140-400) 


 


 





 


Neutrophils (%) (Auto) 66 % (31-73)    


 


Lymphocytes (%) (Auto) 21 % (24-48)    


 


Monocytes (%) (Auto) 12 % (0-9)    


 


Eosinophils (%) (Auto) 0 % (0-3)    


 


Basophils (%) (Auto) 1 % (0-3)    


 


Neutrophils # (Auto)


 6.1 x10^3/uL


(1.8-7.7) 


 


 





 


Lymphocytes # (Auto)


 2.0 x10^3/uL


(1.0-4.8) 


 


 





 


Monocytes # (Auto)


 1.1 x10^3/uL


(0.0-1.1) 


 


 





 


Eosinophils # (Auto)


 0.0 x10^3/uL


(0.0-0.7) 


 


 





 


Basophils # (Auto)


 0.0 x10^3/uL


(0.0-0.2) 


 


 





 


Glucose (Fingerstick)


 


 142 mg/dL


(70-99) 145 mg/dL


(70-99) 





 


Sodium Level


 


 


 


 140 mmol/L


(136-145)


 


Potassium Level


 


 


 


 3.7 mmol/L


(3.5-5.1)


 


Chloride Level


 


 


 


 103 mmol/L


()


 


Carbon Dioxide Level


 


 


 


 26 mmol/L


(21-32)


 


Anion Gap    11 (6-14) 


 


Blood Urea Nitrogen


 


 


 


 80 mg/dL


(7-20)


 


Creatinine


 


 


 


 1.6 mg/dL


(0.6-1.0)


 


Estimated GFR


(Cockcroft-Gault) 


 


 


 34.3 





 


BUN/Creatinine Ratio    50 (6-20) 


 


Glucose Level


 


 


 


 130 mg/dL


(70-99)


 


Calcium Level


 


 


 


 8.4 mg/dL


(8.5-10.1)


 


Phosphorus Level


 


 


 


 5.2 mg/dL


(2.6-4.7)


 


Total Bilirubin


 


 


 


 0.4 mg/dL


(0.2-1.0)


 


Aspartate Amino Transf


(AST/SGOT) 


 


 


 23 U/L (15-37) 





 


Alanine Aminotransferase


(ALT/SGPT) 


 


 


 12 U/L (14-59) 





 


Alkaline Phosphatase


 


 


 


 52 U/L


()


 


Total Protein


 


 


 


 4.9 g/dL


(6.4-8.2)


 


Albumin


 


 


 


 2.6 g/dL


(3.4-5.0)


 


Albumin/Globulin Ratio    1.1 (1.0-1.7) 


 


Test


 4/17/20


06:06 4/17/20


07:00 4/17/20


08:00 





 


Glucose (Fingerstick)


 129 mg/dL


(70-99) 


 


 





 


White Blood Count


 


 5.8 x10^3/uL


(4.0-11.0) 


 





 


Red Blood Count


 


 2.40 x10^6/uL


(3.50-5.40) 


 





 


Hemoglobin


 


 7.6 g/dL


(12.0-15.5) 


 





 


Hematocrit


 


 22.8 %


(36.0-47.0) 


 





 


Mean Corpuscular Volume  95 fL ()   


 


Mean Corpuscular Hemoglobin  32 pg (25-35)   


 


Mean Corpuscular Hemoglobin


Concent 


 33 g/dL


(31-37) 


 





 


Red Cell Distribution Width


 


 18.4 %


(11.5-14.5) 


 





 


Platelet Count


 


 498 x10^3/uL


(140-400) 


 





 


Neutrophils (%) (Auto)  67 % (31-73)   


 


Lymphocytes (%) (Auto)  17 % (24-48)   


 


Monocytes (%) (Auto)  14 % (0-9)   


 


Eosinophils (%) (Auto)  1 % (0-3)   


 


Basophils (%) (Auto)  1 % (0-3)   


 


Neutrophils # (Auto)


 


 3.9 x10^3/uL


(1.8-7.7) 


 





 


Lymphocytes # (Auto)


 


 1.0 x10^3/uL


(1.0-4.8) 


 





 


Monocytes # (Auto)


 


 0.8 x10^3/uL


(0.0-1.1) 


 





 


Eosinophils # (Auto)


 


 0.0 x10^3/uL


(0.0-0.7) 


 





 


Basophils # (Auto)


 


 0.0 x10^3/uL


(0.0-0.2) 


 





 


O2 Saturation   98 % (92-99)  


 


Arterial Blood pH


 


 


 7.47


(7.35-7.45) 





 


Arterial Blood pCO2 at


Patient Temp 


 


 33 mmHg


(35-46) 





 


Arterial Blood pO2 at Patient


Temp 


 


 136 mmHg


() 





 


Arterial Blood HCO3


 


 


 24 mmol/L


(21-28) 





 


Arterial Blood Base Excess


 


 


 0 mmol/L


(-3-3) 





 


FiO2   35  








Microbiology


4/14/20 Blood Culture - Preliminary, Resulted


          NO GROWTH AFTER 2 DAYS


4/12/20 Urine Culture - Final, Complete


          


4/12/20 Urine Culture Result 1 ([facility]) - Final, Complete


Medications





Current Medications


Sodium Chloride 1,000 ml @  1,000 mls/hr Q1H IV  Last administered on 3/16/20at 

03:00;  Start 3/16/20 at 03:00;  Stop 3/16/20 at 03:59;  Status DC


Ondansetron HCl (Zofran) 4 mg 1X  ONCE IVP  Last administered on 3/16/20at 

03:27;  Start 3/16/20 at 03:00;  Stop 3/16/20 at 03:01;  Status DC


Morphine Sulfate (Morphine Sulfate) 4 mg 1X  ONCE IV ;  Start 3/16/20 at 03:00; 

Stop 3/16/20 at 03:01;  Status Cancel


Ketorolac Tromethamine (Toradol 30mg Vial) 30 mg 1X  ONCE IV  Last administered 

on 3/16/20at 02:54;  Start 3/16/20 at 03:00;  Stop 3/16/20 at 03:01;  Status DC


Fentanyl Citrate (Fentanyl 2ml Vial) 25 mcg 1X  ONCE IVP  Last administered on 

3/16/20at 03:23;  Start 3/16/20 at 03:30;  Stop 3/16/20 at 03:31;  Status DC


Fentanyl Citrate (Fentanyl 2ml Vial) 100 mcg STK-MED ONCE .ROUTE ;  Start 

3/16/20 at 03:18;  Stop 3/16/20 at 03:18;  Status DC


Iohexol (Omnipaque 350 Mg/ml) 90 ml 1X  ONCE IV  Last administered on 3/16/20at 

03:25;  Start 3/16/20 at 03:30;  Stop 3/16/20 at 03:31;  Status DC


Info (CONTRAST GIVEN -- Rx MONITORING) 1 each PRN DAILY  PRN MC SEE COMMENTS;  

Start 3/16/20 at 03:30;  Stop 3/18/20 at 03:29;  Status DC


Hydromorphone HCl (Dilaudid) 0.5 mg 1X  ONCE IV  Last administered on 3/16/20at 

03:55;  Start 3/16/20 at 04:30;  Stop 3/16/20 at 04:32;  Status DC


Ondansetron HCl (Zofran) 4 mg PRN Q8HRS  PRN IV NAUSEA/VOMITING 1ST CHOICE;  

Start 3/16/20 at 05:00;  Stop 3/16/20 at 09:27;  Status DC


Morphine Sulfate (Morphine Sulfate) 2 mg PRN Q2HR  PRN IV SEVERE PAIN 7-10 Last 

administered on 3/17/20at 12:26;  Start 3/16/20 at 05:00;  Stop 3/17/20 at 

14:15;  Status DC


Sodium Chloride 1,000 ml @  125 mls/hr Q8H IV  Last administered on 3/16/20at 

20:56;  Start 3/16/20 at 05:00;  Stop 3/17/20 at 04:59;  Status DC


Hydromorphone HCl (Dilaudid) 0.5 mg PRN Q3HRS  PRN IV SEVERE PAIN 7-10 Last 

administered on 3/17/20at 10:06;  Start 3/16/20 at 05:00;  Stop 3/17/20 at 

12:01;  Status DC


Piperacillin Sod/ Tazobactam Sod 4.5 gm/Sodium Chloride 100 ml @  200 mls/hr 1X 

ONCE IV  Last administered on 3/16/20at 05:44;  Start 3/16/20 at 06:00;  Stop 

3/16/20 at 06:29;  Status DC


Ondansetron HCl (Zofran) 4 mg PRN Q4HRS  PRN IV NAUSEA/VOMITING 1ST CHOICE Last 

administered on 4/17/20at 03:51;  Start 3/16/20 at 09:30


Insulin Human Lispro (HumaLOG) 0-9 UNITS Q6HRS SQ  Last administered on 

4/15/20at 23:50;  Start 3/16/20 at 09:30


Dextrose (Dextrose 50%-Water Syringe) 12.5 gm PRN Q15MIN  PRN IV SEE COMMENTS;  

Start 3/16/20 at 09:30


Pantoprazole Sodium (PROTONIX VIAL for IV PUSH) 40 mg DAILYAC IVP  Last adminis

tered on 4/16/20at 08:54;  Start 3/16/20 at 11:30


Prochlorperazine Edisylate (Compazine) 10 mg PRN Q6HRS  PRN IV NAUSEA/VOMITING, 

2nd CHOICE Last administered on 3/17/20at 00:42;  Start 3/16/20 at 17:45


Atenolol (Tenormin) 100 mg DAILY PO ;  Start 3/17/20 at 09:00;  Stop 3/16/20 at 

20:08;  Status DC


Metoprolol Tartrate (Lopressor Vial) 2.5 mg Q6HRS IVP  Last administered on 

3/17/20at 05:51;  Start 3/16/20 at 20:15;  Stop 3/17/20 at 10:02;  Status DC


Metoprolol Tartrate (Lopressor Vial) 5 mg Q6HRS IVP  Last administered on 

3/26/20at 00:12;  Start 3/17/20 at 10:15;  Stop 3/28/20 at 08:48;  Status DC


Hydromorphone HCl (Dilaudid) 1 mg PRN Q3HRS  PRN IV SEVERE PAIN 7-10 Last 

administered on 3/23/20at 05:13;  Start 3/17/20 at 12:00;  Stop 3/31/20 at 

00:25;  Status DC


Lidocaine HCl (Buffered Lidocaine 1%) 3 ml STK-MED ONCE .ROUTE ;  Start 3/17/20 

at 12:55;  Stop 3/17/20 at 12:56;  Status DC


Albumin Human 500 ml @  125 mls/hr 1X  ONCE IV  Last administered on 3/17/20at 

14:33;  Start 3/17/20 at 14:30;  Stop 3/17/20 at 18:32;  Status DC


Norepinephrine Bitartrate 8 mg/ Dextrose 258 ml @  17.299 mls/ hr CONT  PRN IV 

PER PROTOCOL Last administered on 4/14/20at 12:48;  Start 3/17/20 at 15:30;  

Stop 4/17/20 at 09:19;  Status DC


Sodium Chloride 1,000 ml @  125 mls/hr Q8H IV  Last administered on 3/17/20at 

21:04;  Start 3/17/20 at 16:00;  Stop 3/18/20 at 02:42;  Status DC


Albumin Human 500 ml @  125 mls/hr PRN BID  PRN IV After every 2L NSS & BP < 

90mm Last administered on 4/1/20at 14:21;  Start 3/17/20 at 16:00


Iohexol (Omnipaque 300 Mg/ml) 60 ml 1X  ONCE IV  Last administered on 3/17/20at 

17:20;  Start 3/17/20 at 17:00;  Stop 3/17/20 at 17:01;  Status DC


Info (CONTRAST GIVEN -- Rx MONITORING) 1 each PRN DAILY  PRN MC SEE COMMENTS;  

Start 3/17/20 at 17:00;  Stop 3/19/20 at 16:59;  Status DC


Meropenem 1 gm/ Sodium Chloride 100 ml @  200 mls/hr Q8HRS IV  Last administered

on 3/18/20at 05:45;  Start 3/17/20 at 20:00;  Stop 3/18/20 at 08:48;  Status DC


Furosemide (Lasix) 40 mg 1X  ONCE IVP  Last administered on 3/17/20at 22:12;  

Start 3/17/20 at 22:30;  Stop 3/17/20 at 22:31;  Status DC


Calcium Chloride 1000 mg/Sodium Chloride 110 ml @  220 mls/hr 1X  ONCE IV  Last 

administered on 3/17/20at 22:11;  Start 3/17/20 at 22:30;  Stop 3/17/20 at 

22:59;  Status DC


Albuterol Sulfate (Ventolin Neb Soln) 2.5 mg 1X  ONCE NEB  Last administered on 

3/18/20at 00:56;  Start 3/17/20 at 22:30;  Stop 3/17/20 at 22:31;  Status DC


Insulin Human Regular (HumuLIN R VIAL) 5 unit 1X  ONCE IV  Last administered on 

3/17/20at 22:14;  Start 3/17/20 at 22:30;  Stop 3/17/20 at 22:31;  Status DC


Magnesium Sulfate 50 ml @ 25 mls/hr 1X  ONCE IV  Last administered on 3/18/20at 

02:57;  Start 3/18/20 at 03:00;  Stop 3/18/20 at 04:59;  Status DC


Calcium Gluconate 1000 mg/Sodium Chloride 110 ml @  220 mls/hr 1X  ONCE IV  Last

administered on 3/18/20at 02:46;  Start 3/18/20 at 03:00;  Stop 3/18/20 at 

03:29;  Status DC


Sodium Chloride 1,000 ml @  200 mls/hr Q5H IV  Last administered on 3/18/20at 

02:46;  Start 3/18/20 at 03:00;  Stop 3/18/20 at 10:21;  Status DC


Calcium Gluconate 1000 mg/Sodium Chloride 110 ml @  220 mls/hr 1X  ONCE IV  Last

administered on 3/18/20at 03:21;  Start 3/18/20 at 03:30;  Stop 3/18/20 at 

03:59;  Status DC


Sodium Bicarbonate 50 meq/Sodium Chloride 1,050 ml @  75 mls/hr Q14H IV  Last 

administered on 3/22/20at 21:10;  Start 3/18/20 at 07:30;  Stop 3/23/20 at 

10:28;  Status DC


Calcium Gluconate 2000 mg/Sodium Chloride 120 ml @  220 mls/hr 1X  ONCE IV  Last

administered on 3/18/20at 09:05;  Start 3/18/20 at 07:30;  Stop 3/18/20 at 

08:02;  Status DC


Lidocaine HCl (Xylocaine-Mpf 1% 2ml Vial) 2 ml STK-MED ONCE .ROUTE ;  Start 

3/18/20 at 08:47;  Stop 3/18/20 at 08:47;  Status DC


Meropenem 500 mg/ Sodium Chloride 50 ml @  100 mls/hr Q12HR IV  Last 

administered on 3/23/20at 21:01;  Start 3/18/20 at 18:00;  Stop 3/24/20 at 

07:58;  Status DC


Lidocaine HCl (Buffered Lidocaine 1%) 3 ml STK-MED ONCE .ROUTE ;  Start 3/18/20 

at 09:46;  Stop 3/18/20 at 09:46;  Status DC


Lidocaine HCl (Buffered Lidocaine 1%) 6 ml 1X  ONCE INJ  Last administered on 

3/18/20at 10:26;  Start 3/18/20 at 10:15;  Stop 3/18/20 at 10:16;  Status DC


Info (Tpn Per Pharmacy) 1 each PRN DAILY  PRN MC SEE COMMENTS Last administered 

on 4/16/20at 13:19;  Start 3/18/20 at 12:00


Sodium Chloride 1,000 ml @  1,000 mls/hr Q1H PRN IV hypotension;  Start 3/18/20 

at 12:07;  Stop 3/18/20 at 18:06;  Status DC


Diphenhydramine HCl (Benadryl) 25 mg 1X PRN  PRN IV ITCHING;  Start 3/18/20 at 

12:15;  Stop 3/19/20 at 12:14;  Status DC


Diphenhydramine HCl (Benadryl) 25 mg 1X PRN  PRN IV ITCHING;  Start 3/18/20 at 

12:15;  Stop 3/19/20 at 12:14;  Status DC


Sodium Chloride 1,000 ml @  400 mls/hr Q2H30M PRN IV PATENCY;  Start 3/18/20 at 

12:07;  Stop 3/19/20 at 00:06;  Status DC


Info (PHARMACY MONITORING -- do not chart) 1 each PRN DAILY  PRN MC SEE 

COMMENTS;  Start 3/18/20 at 12:15;  Stop 3/20/20 at 08:13;  Status DC


Sodium Chloride 90 meq/Calcium Gluconate 10 meq/ Multivitamins 10 ml/Chromium/ 

Copper/Manganese/ Seleni/Zn 1 ml/ Total Parenteral Nutrition/Amino 

Acids/Dextrose/ Fat Emulsion Intravenous 55.005 ml  @ 2.292 mls/hr TPN  CONT IV 

;  Start 3/18/20 at 22:00;  Stop 3/18/20 at 12:33;  Status DC


Info (Tpn Per Pharmacy) 1 each PRN DAILY  PRN MC SEE COMMENTS;  Start 3/18/20 at

12:30;  Status UNV


Sodium Chloride 90 meq/Calcium Gluconate 10 meq/ Multivitamins 10 ml/Chromium/ 

Copper/Manganese/ Seleni/Zn 0.5 ml/ Total Parenteral Nutrition/Amino 

Acids/Dextrose/ Fat Emulsion Intravenous 1,512 ml @  63 mls/hr TPN  CONT IV  

Last administered on 3/18/20at 22:06;  Start 3/18/20 at 22:00;  Stop 3/19/20 at 

21:59;  Status DC


Calcium Carbonate/ Glycine (Tums) 500 mg PRN AFTMEALHC  PRN PO INDIGESTION;  

Start 3/18/20 at 17:45


Calcium Gluconate (Calcium Gluconate) 2,000 mg 1X  ONCE IVP  Last administered 

on 3/19/20at 02:19;  Start 3/19/20 at 02:15;  Stop 3/19/20 at 02:16;  Status DC


Calcium Chloride 3000 mg/Sodium Chloride 1,030 ml @  50 mls/hr [id number] IV  Last 

administered on 3/21/20at 02:17;  Start 3/19/20 at 08:00;  Stop 3/21/20 at 

15:23;  Status DC


Lorazepam (Ativan Inj) 1 mg PRN Q4HRS  PRN IVP ANXIETY / AGITATION, 2nd choic 

Last administered on 4/17/20at 03:51;  Start 3/19/20 at 09:00;  Stop 4/17/20 at 

09:19;  Status DC


Sodium Chloride 1,000 ml @  1,000 mls/hr Q1H PRN IV hypotension;  Start 3/19/20 

at 08:56;  Stop 3/19/20 at 14:55;  Status DC


Albumin Human 200 ml @  200 mls/hr 1X PRN  PRN IV Hypotension;  Start 3/19/20 at

09:00;  Stop 3/19/20 at 14:59;  Status DC


Diphenhydramine HCl (Benadryl) 25 mg 1X PRN  PRN IV ITCHING;  Start 3/19/20 at 

09:00;  Stop 3/20/20 at 08:59;  Status DC


Diphenhydramine HCl (Benadryl) 25 mg 1X PRN  PRN IV ITCHING;  Start 3/19/20 at 

09:00;  Stop 3/20/20 at 08:59;  Status DC


Sodium Chloride 1,000 ml @  400 mls/hr Q2H30M PRN IV PATENCY;  Start 3/19/20 at 

08:56;  Stop 3/19/20 at 20:55;  Status DC


Info (PHARMACY MONITORING -- do not chart) 1 each PRN DAILY  PRN MC SEE COMMENT

S;  Start 3/19/20 at 09:00;  Status UNV


Info (PHARMACY MONITORING -- do not chart) 1 each PRN DAILY  PRN MC SEE 

COMMENTS;  Start 3/19/20 at 09:00;  Stop 3/20/20 at 08:13;  Status DC


Digoxin (Lanoxin) 500 mcg 1X  ONCE IV  Last administered on 3/19/20at 10:04;  

Start 3/19/20 at 10:00;  Stop 3/19/20 at 10:01;  Status DC


Digoxin (Lanoxin) 125 mcg 1X  ONCE IV  Last administered on 3/19/20at 17:10;  

Start 3/19/20 at 18:00;  Stop 3/19/20 at 18:01;  Status DC


Magnesium Sulfate 100 ml @  25 mls/hr 1X  ONCE IV  Last administered on 

3/19/20at 12:48;  Start 3/19/20 at 13:00;  Stop 3/19/20 at 16:59;  Status DC


Sodium Chloride 90 meq/Magnesium Sulfate 10 meq/ Calcium Gluconate 20 meq/ 

Multivitamins 10 ml/Chromium/ Copper/Manganese/ Seleni/Zn 0.5 ml/ Total 

Parenteral Nutrition/Amino Acids/Dextrose/ Fat Emulsion Intravenous 1,512 ml @  

63 mls/hr TPN  CONT IV  Last administered on 3/19/20at 22:25;  Start 3/19/20 at 

22:00;  Stop 3/20/20 at 21:59;  Status DC


Sodium Chloride 1,000 ml @  1,000 mls/hr Q1H PRN IV hypotension;  Start 3/20/20 

at 08:05;  Stop 3/20/20 at 14:04;  Status DC


Albumin Human 200 ml @  200 mls/hr 1X  ONCE IV  Last administered on 3/20/20at 

08:57;  Start 3/20/20 at 08:15;  Stop 3/20/20 at 09:14;  Status DC


Diphenhydramine HCl (Benadryl) 25 mg 1X PRN  PRN IV ITCHING;  Start 3/20/20 at 

08:15;  Stop 3/21/20 at 08:14;  Status DC


Diphenhydramine HCl (Benadryl) 25 mg 1X PRN  PRN IV ITCHING;  Start 3/20/20 at 

08:15;  Stop 3/21/20 at 08:14;  Status DC


Sodium Chloride 1,000 ml @  400 mls/hr Q2H30M PRN IV PATENCY;  Start 3/20/20 at 

08:05;  Stop 3/20/20 at 20:04;  Status DC


Info (PHARMACY MONITORING -- do not chart) 1 each PRN DAILY  PRN MC SEE COM

MENTS;  Start 3/20/20 at 08:15;  Stop 3/24/20 at 07:57;  Status DC


Sodium Chloride 90 meq/Potassium Chloride 15 meq/ Potassium Phosphate 10 mmol/ 

Magnesium Sulfate 10 meq/Calcium Gluconate 20 meq/ Multivitamins 10 ml/Chromium/

Copper/Manganese/ Seleni/Zn 0.5 ml/ Total Parenteral Nutrition/Amino 

Acids/Dextrose/ Fat Emulsion Intravenous 1,512 ml @  63 mls/hr TPN  CONT IV  

Last administered on 3/20/20at 21:01;  Start 3/20/20 at 22:00;  Stop 3/21/20 at 

21:59;  Status DC


Potassium Chloride/Water 100 ml @  100 mls/hr 1X  ONCE IV  Last administered on 

3/20/20at 14:09;  Start 3/20/20 at 14:00;  Stop 3/20/20 at 14:59;  Status DC


Benzocaine (Hurricaine One) 1 spray 1X  ONCE MM  Last administered on 3/20/20at 

16:38;  Start 3/20/20 at 14:30;  Stop 3/20/20 at 14:31;  Status DC


Lidocaine HCl (Glydo (Lidocaine) Jelly) 1 thomas 1X  ONCE MM  Last administered on 

3/20/20at 16:38;  Start 3/20/20 at 14:30;  Stop 3/20/20 at 14:31;  Status DC


Linezolid/Dextrose 300 ml @  300 mls/hr Q12HR IV  Last administered on 3/26/20at

21:04;  Start 3/20/20 at 20:00;  Stop 3/27/20 at 07:50;  Status DC


Acetaminophen (Tylenol) 650 mg PRN Q6HRS  PRN PO MILD PAIN / TEMP;  Start 

3/21/20 at 03:30;  Stop 3/21/20 at 03:36;  Status DC


Acetaminophen (Tylenol) 650 mg PRN Q6HRS  PRN PEG MILD PAIN / TEMP Last a

dministered on 4/16/20at 19:56;  Start 3/21/20 at 03:36


Sodium Chloride 1,000 ml @  1,000 mls/hr Q1H PRN IV hypotension;  Start 3/21/20 

at 07:50;  Stop 3/21/20 at 13:49;  Status DC


Albumin Human 200 ml @  200 mls/hr 1X PRN  PRN IV Hypotension;  Start 3/21/20 at

08:00;  Stop 3/21/20 at 13:59;  Status DC


Sodium Chloride (Normal Saline Flush) 10 ml 1X PRN  PRN IV AP catheter pack;  St

art 3/21/20 at 08:00;  Stop 3/22/20 at 07:59;  Status DC


Sodium Chloride (Normal Saline Flush) 10 ml 1X PRN  PRN IV  catheter pack;  

Start 3/21/20 at 08:00;  Stop 3/22/20 at 07:59;  Status DC


Sodium Chloride 1,000 ml @  400 mls/hr Q2H30M PRN IV PATENCY;  Start 3/21/20 at 

07:50;  Stop 3/21/20 at 19:49;  Status DC


Info (PHARMACY MONITORING -- do not chart) 1 each PRN DAILY  PRN MC SEE 

COMMENTS;  Start 3/21/20 at 08:00;  Status UNV


Info (PHARMACY MONITORING -- do not chart) 1 each PRN DAILY  PRN MC SEE COMME

NTS;  Start 3/21/20 at 08:00;  Stop 3/23/20 at 08:25;  Status DC


Sodium Chloride 90 meq/Potassium Chloride 15 meq/ Potassium Phosphate 10 mmol/ 

Magnesium Sulfate 10 meq/Calcium Gluconate 20 meq/ Multivitamins 10 ml/Chromium/

Copper/Manganese/ Seleni/Zn 0.5 ml/ Total Parenteral Nutrition/Amino 

Acids/Dextrose/ Fat Emulsion Intravenous 1,512 ml @  63 mls/hr TPN  CONT IV  

Last administered on 3/21/20at 20:57;  Start 3/21/20 at 22:00;  Stop 3/22/20 at 

21:59;  Status DC


Sodium Chloride 90 meq/Potassium Chloride 15 meq/ Potassium Phosphate 15 mmol/ 

Magnesium Sulfate 10 meq/Calcium Gluconate 20 meq/ Multivitamins 10 ml/Chromium/

Copper/Manganese/ Seleni/Zn 0.5 ml/ Total Parenteral Nutrition/Amino 

Acids/Dextrose/ Fat Emulsion Intravenous 1,512 ml @  63 mls/hr TPN  CONT IV ;  

Start 3/22/20 at 22:00;  Stop 3/22/20 at 14:16;  Status DC


Sodium Chloride 90 meq/Potassium Chloride 15 meq/ Potassium Phosphate 15 mmol/ 

Magnesium Sulfate 10 meq/Calcium Gluconate 20 meq/ Multivitamins 10 ml/Chromium/

Copper/Manganese/ Seleni/Zn 0.5 ml/ Total Parenteral Nutrition/Amino 

Acids/Dextrose/ Fat Emulsion Intravenous 1,200 ml @  50 mls/hr TPN  CONT IV ;  

Start 3/22/20 at 22:00;  Stop 3/22/20 at 14:17;  Status DC


Sodium Chloride 90 meq/Potassium Chloride 15 meq/ Potassium Phosphate 10 mmol/ 

Magnesium Sulfate 10 meq/Calcium Gluconate 20 meq/ Multivitamins 10 ml/Chromium/

Copper/Manganese/ Seleni/Zn 0.5 ml/ Total Parenteral Nutrition/Amino 

Acids/Dextrose/ Fat Emulsion Intravenous 1,200 ml @  50 mls/hr TPN  CONT IV  

Last administered on 3/22/20at 23:29;  Start 3/22/20 at 22:00;  Stop 3/23/20 at 

21:59;  Status DC


Sodium Chloride 1,000 ml @  1,000 mls/hr Q1H PRN IV hypotension;  Start 3/23/20 

at 07:28;  Stop 3/23/20 at 13:27;  Status DC


Albumin Human 200 ml @  200 mls/hr 1X  ONCE IV  Last administered on 3/23/20at 

08:51;  Start 3/23/20 at 07:30;  Stop 3/23/20 at 08:29;  Status DC


Diphenhydramine HCl (Benadryl) 25 mg 1X PRN  PRN IV ITCHING;  Start 3/23/20 at 

07:30;  Stop 3/24/20 at 07:29;  Status DC


Diphenhydramine HCl (Benadryl) 25 mg 1X PRN  PRN IV ITCHING;  Start 3/23/20 at 

07:30;  Stop 3/24/20 at 07:29;  Status DC


Sodium Chloride 1,000 ml @  400 mls/hr Q2H30M PRN IV PATENCY;  Start 3/23/20 at 

07:28;  Stop 3/23/20 at 19:27;  Status DC


Info (PHARMACY MONITORING -- do not chart) 1 each PRN DAILY  PRN MC SEE 

COMMENTS;  Start 3/23/20 at 07:30;  Stop 4/3/20 at 13:01;  Status DC


Metronidazole 100 ml @  100 mls/hr Q6HRS IV  Last administered on 4/8/20at 

06:26;  Start 3/23/20 at 08:30;  Stop 4/8/20 at 09:58;  Status DC


Micafungin Sodium 100 mg/Dextrose 100 ml @  100 mls/hr Q24H IV  Last 

administered on 4/16/20at 15:08;  Start 3/23/20 at 09:00


Propofol 0 ml @ As Directed STK-MED ONCE IV ;  Start 3/23/20 at 07:53;  Stop 

3/23/20 at 07:53;  Status DC


Etomidate (Amidate) 20 mg STK-MED ONCE IV ;  Start 3/23/20 at 07:53;  Stop 

3/23/20 at 07:54;  Status DC


Midazolam HCl (Versed) 5 mg STK-MED ONCE .ROUTE ;  Start 3/23/20 at 07:57;  Stop

3/23/20 at 07:57;  Status DC


Fentanyl Citrate 30 ml @ 0 mls/hr CONT  PRN IV SEE PROTOCOL Last administered on

4/17/20at 06:12;  Start 3/23/20 at 08:15;  Stop 4/17/20 at 09:19;  Status DC


Artificial Tears (Artificial Tears) 1 drop PRN Q1HR  PRN OU DRY EYE, 1st choice;

 Start 3/23/20 at 08:15


Midazolam HCl 50 mg/Sodium Chloride 50 ml @ 0 mls/hr CONT  PRN IV SEE PROTOCOL 

Last administered on 3/26/20at 22:39;  Start 3/23/20 at 08:15;  Stop 3/28/20 at 

15:59;  Status DC


Etomidate (Amidate) 8 mg 1X  ONCE IV  Last administered on 3/23/20at 08:33;  

Start 3/23/20 at 08:30;  Stop 3/23/20 at 08:31;  Status DC


Succinylcholine Chloride (Anectine) 120 mg 1X  ONCE IV  Last administered on 

3/23/20at 08:34;  Start 3/23/20 at 08:30;  Stop 3/23/20 at 08:31;  Status DC


Midazolam HCl (Versed) 5 mg 1X  ONCE IV ;  Start 3/23/20 at 08:30;  Stop 3/23/20

at 08:31;  Status DC


Potassium Chloride 15 meq/ Bicarbonate Dialysis Soln w/ out KCl 5,007.5 ml  @ 

1,000 mls/ hr Q5H1M IV  Last administered on 3/24/20at 11:11;  Start 3/23/20 at 

12:00;  Stop 3/24/20 at 11:15;  Status DC


Potassium Chloride 15 meq/ Bicarbonate Dialysis Soln w/ out KCl 5,007.5 ml  @ 

1,000 mls/ hr Q5H1M IV  Last administered on 3/24/20at 11:12;  Start 3/23/20 at 

12:00;  Stop 3/24/20 at 11:17;  Status DC


Potassium Chloride 15 meq/ Bicarbonate Dialysis Soln w/ out KCl 5,007.5 ml  @ 

1,000 mls/ hr Q5H1M IV  Last administered on 3/24/20at 11:11;  Start 3/23/20 at 

12:00;  Stop 3/24/20 at 11:19;  Status DC


Sodium Chloride 90 meq/Potassium Chloride 15 meq/ Potassium Phosphate 10 mmol/ 

Magnesium Sulfate 10 meq/Calcium Gluconate 20 meq/ Multivitamins 10 ml/Chromium/

Copper/Manganese/ Seleni/Zn 0.5 ml/ Total Parenteral Nutrition/Amino 

Acids/Dextrose/ Fat Emulsion Intravenous 1,400 ml @  58.333 mls/ hr TPN  CONT IV

 Last administered on 3/23/20at 21:42;  Start 3/23/20 at 22:00;  Stop 3/24/20 at

21:59;  Status DC


Heparin Sodium (Porcine) (Heparin Sodium) 5,000 unit Q8HRS SQ  Last administered

on 3/28/20at 05:55;  Start 3/23/20 at 15:00;  Stop 3/28/20 at 13:28;  Status DC


Meropenem 500 mg/ Sodium Chloride 50 ml @  100 mls/hr Q6HRS IV  Last 

administered on 3/25/20at 06:00;  Start 3/24/20 at 09:00;  Stop 3/25/20 at 07:2

9;  Status DC


Potassium Phosphate 20 mmol/ Sodium Chloride 106.6667 ml @  51.667 m... 1X  ONCE

IV  Last administered on 3/24/20at 11:22;  Start 3/24/20 at 10:15;  Stop 3/24/20

at 12:18;  Status DC


Acetaminophen (Tylenol Supp) 650 mg PRN Q6HRS  PRN UT MILD PAIN / TEMP Last 

administered on 4/14/20at 08:01;  Start 3/24/20 at 10:30


Potassium Chloride/Water 100 ml @  100 mls/hr Q1H IV  Last administered on 

3/24/20at 12:12;  Start 3/24/20 at 11:00;  Stop 3/24/20 at 12:59;  Status DC


Potassium Chloride 20 meq/ Bicarbonate Dialysis Soln w/ out KCl 5,010 ml @  

1,000 mls/hr Q5H1M IV  Last administered on 3/25/20at 08:48;  Start 3/24/20 at 

12:00;  Stop 3/25/20 at 13:03;  Status DC


Potassium Chloride 20 meq/ Bicarbonate Dialysis Soln w/ out KCl 5,010 ml @  

1,000 mls/hr Q5H1M IV  Last administered on 3/29/20at 14:52;  Start 3/24/20 at 

11:30;  Stop 3/29/20 at 19:59;  Status DC


Potassium Chloride 20 meq/ Bicarbonate Dialysis Soln w/ out KCl 5,010 ml @  

1,000 mls/hr Q5H1M IV  Last administered on 3/29/20at 14:53;  Start 3/24/20 at 

11:30;  Stop 3/29/20 at 19:59;  Status DC


Sodium Chloride 90 meq/Potassium Chloride 15 meq/ Potassium Phosphate 15 mmol/ 

Magnesium Sulfate 10 meq/Calcium Gluconate 15 meq/ Multivitamins 10 ml/Chromium/

Copper/Manganese/ Seleni/Zn 0.5 ml/ Total Parenteral Nutrition/Amino 

Acids/Dextrose/ Fat Emulsion Intravenous 1,400 ml @  58.333 mls/ hr TPN  CONT IV

 Last administered on 3/24/20at 22:17;  Start 3/24/20 at 22:00;  Stop 3/25/20 at

21:59;  Status DC


Cefepime HCl (Maxipime) 2 gm Q12HR IVP  Last administered on 4/7/20at 20:56;  

Start 3/25/20 at 09:00;  Stop 4/8/20 at 09:58;  Status DC


Daptomycin 500 mg/ Sodium Chloride 50 ml @  100 mls/hr Q48H IV  Last 

administered on 4/10/20at 09:57;  Start 3/25/20 at 08:30;  Stop 4/10/20 at 

10:07;  Status DC


Lidocaine HCl (Buffered Lidocaine 1%) 3 ml 1X  ONCE INJ  Last administered on 

3/25/20at 10:27;  Start 3/25/20 at 10:30;  Stop 3/25/20 at 10:31;  Status DC


Potassium Phosphate 20 mmol/ Sodium Chloride 106.6667 ml @  51.667 m... 1X  ONCE

IV  Last administered on 3/25/20at 12:51;  Start 3/25/20 at 13:00;  Stop 3/25/20

at 15:03;  Status DC


Sodium Chloride 90 meq/Potassium Chloride 15 meq/ Potassium Phosphate 18 mmol/ 

Magnesium Sulfate 8 meq/Calcium Gluconate 15 meq/ Multivitamins 10 ml/Chromium/ 

Copper/Manganese/ Seleni/Zn 0.5 ml/ Total Parenteral Nutrition/Amino 

Acids/Dextrose/ Fat Emulsion Intravenous 1,400 ml @  58.333 mls/ hr TPN  CONT IV

 Last administered on 3/25/20at 22:16;  Start 3/25/20 at 22:00;  Stop 3/26/20 at

21:59;  Status DC


Potassium Chloride 20 meq/ Bicarbonate Dialysis Soln w/ out KCl 5,010 ml @  

1,000 mls/hr Q5H1M IV  Last administered on 3/29/20at 14:54;  Start 3/25/20 at 

16:00;  Stop 3/29/20 at 19:59;  Status DC


Multi-Ingred Cream/Lotion/Oil/ Oint (Artificial Tears Eye Ointment) 1 thomas PRN 

Q1HR  PRN OU DRY EYE, 2nd choice Last administered on 4/13/20at 08:19;  Start 

3/25/20 at 17:30


Sodium Chloride 90 meq/Potassium Chloride 15 meq/ Potassium Phosphate 18 mmol/ 

Magnesium Sulfate 8 meq/Calcium Gluconate 15 meq/ Multivitamins 10 ml/Chromium/ 

Copper/Manganese/ Seleni/Zn 0.5 ml/ Total Parenteral Nutrition/Amino 

Acids/Dextrose/ Fat Emulsion Intravenous 1,400 ml @  58.333 mls/ hr TPN  CONT IV

 Last administered on 3/26/20at 22:00;  Start 3/26/20 at 22:00;  Stop 3/27/20 at

21:59;  Status DC


Albumin Human 500 ml @  125 mls/hr 1X  ONCE IV ;  Start 3/26/20 at 14:15;  Stop 

3/26/20 at 18:14;  Status DC


Sodium Chloride 90 meq/Potassium Chloride 15 meq/ Potassium Phosphate 18 mmol/ 

Magnesium Sulfate 8 meq/Calcium Gluconate 15 meq/ Multivitamins 10 ml/Chromium/ 

Copper/Manganese/ Seleni/Zn 0.5 ml/ Insulin Human Regular 10 unit/ Total 

Parenteral Nutrition/Amino Acids/Dextrose/ Fat Emulsion Intravenous 1,400 ml @  

58.333 mls/ hr TPN  CONT IV  Last administered on 3/27/20at 21:43;  Start 

3/27/20 at 22:00;  Stop 3/28/20 at 21:59;  Status DC


Lidocaine HCl (Buffered Lidocaine 1%) 3 ml STK-MED ONCE .ROUTE ;  Start 3/25/20 

at 10:00;  Stop 3/27/20 at 13:57;  Status DC


Midazolam HCl 100 mg/Sodium Chloride 100 ml @ 7 mls/hr CONT  PRN IV SEE PROTOCOL

Last administered on 4/8/20at 15:35;  Start 3/28/20 at 16:00


Sodium Chloride 90 meq/Potassium Chloride 15 meq/ Potassium Phosphate 18 mmol/ 

Magnesium Sulfate 8 meq/Calcium Gluconate 15 meq/ Multivitamins 10 ml/Chromium/ 

Copper/Manganese/ Seleni/Zn 0.5 ml/ Insulin Human Regular 15 unit/ Total P

arenteral Nutrition/Amino Acids/Dextrose/ Fat Emulsion Intravenous 1,400 ml @  

58.333 mls/ hr TPN  CONT IV  Last administered on 3/28/20at 20:34;  Start 

3/28/20 at 22:00;  Stop 3/29/20 at 21:59;  Status DC


Info ([facility] Electrolyte Protocol) 1 ea CONT PRN  PRN MC PER PROTOCOL;  Start 

3/29/20 at 13:15


Sodium Chloride 90 meq/Potassium Chloride 15 meq/ Potassium Phosphate 18 mmol/ 

Magnesium Sulfate 8 meq/Calcium Gluconate 15 meq/ Multivitamins 10 ml/Chromium/ 

Copper/Manganese/ Seleni/Zn 0.5 ml/ Insulin Human Regular 15 unit/ Total 

Parenteral Nutrition/Amino Acids/Dextrose/ Fat Emulsion Intravenous 1,400 ml @  

58.333 mls/ hr TPN  CONT IV  Last administered on 3/29/20at 22:05;  Start 

3/29/20 at 22:00;  Stop 3/30/20 at 21:59;  Status DC


Potassium Chloride 15 meq/ Bicarbonate Dialysis Soln w/ out KCl 5,007.5 ml  @ 

1,000 mls/ hr Q5H1M IV  Last administered on 4/1/20at 18:14;  Start 3/29/20 at 

20:00;  Stop 4/2/20 at 13:08;  Status DC


Potassium Chloride 15 meq/ Bicarbonate Dialysis Soln w/ out KCl 5,007.5 ml  @ 

1,000 mls/ hr Q5H1M IV  Last administered on 4/1/20at 18:14;  Start 3/29/20 at 

20:00;  Stop 4/2/20 at 13:08;  Status DC


Potassium Chloride 15 meq/ Bicarbonate Dialysis Soln w/ out KCl 5,007.5 ml  @ 

1,000 mls/ hr Q5H1M IV  Last administered on 4/1/20at 18:14;  Start 3/29/20 at 

20:00;  Stop 4/2/20 at 13:08;  Status DC


Iohexol (Omnipaque 240 Mg/ml) 30 ml 1X  ONCE PO  Last administered on 3/30/20at 

11:30;  Start 3/30/20 at 11:30;  Stop 3/30/20 at 11:33;  Status DC


Info (CONTRAST GIVEN -- Rx MONITORING) 1 each PRN DAILY  PRN MC SEE COMMENTS;  

Start 3/30/20 at 11:45;  Stop 4/1/20 at 11:44;  Status DC


Sodium Chloride 90 meq/Potassium Chloride 15 meq/ Potassium Phosphate 18 mmol/ 

Magnesium Sulfate 8 meq/Calcium Gluconate 15 meq/ Multivitamins 10 ml/Chromium/ 

Copper/Manganese/ Seleni/Zn 0.5 ml/ Insulin Human Regular 15 unit/ Total 

Parenteral Nutrition/Amino Acids/Dextrose/ Fat Emulsion Intravenous 1,400 ml @  

58.333 mls/ hr TPN  CONT IV  Last administered on 3/30/20at 21:47;  Start 

3/30/20 at 22:00;  Stop 3/31/20 at 21:59;  Status DC


Sodium Chloride 90 meq/Potassium Chloride 15 meq/ Potassium Phosphate 18 mmol/ 

Magnesium Sulfate 8 meq/Calcium Gluconate 15 meq/ Multivitamins 10 ml/Chromium/ 

Copper/Manganese/ Seleni/Zn 0.5 ml/ Insulin Human Regular 20 unit/ Total 

Parenteral Nutrition/Amino Acids/Dextrose/ Fat Emulsion Intravenous 1,400 ml @  

58.333 mls/ hr TPN  CONT IV  Last administered on 3/31/20at 21:36;  Start 

3/31/20 at 22:00;  Stop 4/1/20 at 21:59;  Status DC


Alteplase, Recombinant (Cathflo For Central Catheter Clearance) 1 mg 1X  ONCE 

INT CAT  Last administered on 3/31/20at 20:03;  Start 3/31/20 at 19:30;  Stop 

3/31/20 at 19:46;  Status DC


Alteplase, Recombinant (Cathflo For Central Catheter Clearance) 1 mg 1X  ONCE 

INT CAT  Last administered on 3/31/20at 22:05;  Start 3/31/20 at 22:00;  Stop 

3/31/20 at 22:01;  Status DC


Sodium Chloride 90 meq/Potassium Chloride 15 meq/ Potassium Phosphate 18 mmol/ 

Magnesium Sulfate 8 meq/Calcium Gluconate 15 meq/ Multivitamins 10 ml/Chromium/ 

Copper/Manganese/ Seleni/Zn 0.5 ml/ Insulin Human Regular 20 unit/ Total 

Parenteral Nutrition/Amino Acids/Dextrose/ Fat Emulsion Intravenous 1,400 ml @  

58.333 mls/ hr TPN  CONT IV  Last administered on 4/1/20at 21:30;  Start 4/1/20 

at 22:00;  Stop 4/2/20 at 21:59;  Status DC


Dexmedetomidine HCl 400 mcg/ Sodium Chloride 100 ml @ 0 mls/hr CONT  PRN IV 

ANXIETY / AGITATION Last administered on 4/17/20at 04:12;  Start 4/2/20 at 08:15


Sodium Chloride 500 ml @  500 mls/hr 1X PRN  PRN IV ELEVATED BP, SEE COMMENTS;  

Start 4/2/20 at 08:15


Atropine Sulfate (ATROPINE 0.5mg SYRINGE) 0.5 mg PRN Q5MIN  PRN IV SEE COMMENTS;

 Start 4/2/20 at 08:15


Furosemide (Lasix) 20 mg 1X  ONCE IVP  Last administered on 4/2/20at 08:19;  

Start 4/2/20 at 08:15;  Stop 4/2/20 at 08:16;  Status DC


Lidocaine HCl (Buffered Lidocaine 1%) 3 ml STK-MED ONCE .ROUTE ;  Start 4/2/20 

at 08:39;  Stop 4/2/20 at 08:39;  Status DC


Lidocaine HCl (Buffered Lidocaine 1%) 6 ml 1X  ONCE INJ  Last administered on 

4/2/20at 09:05;  Start 4/2/20 at 09:00;  Stop 4/2/20 at 09:06;  Status DC


Sodium Chloride 90 meq/Potassium Chloride 15 meq/ Potassium Phosphate 18 mmol/ 

Magnesium Sulfate 8 meq/Calcium Gluconate 15 meq/ Multivitamins 10 ml/Chromium/ 

Copper/Manganese/ Seleni/Zn 0.5 ml/ Insulin Human Regular 20 unit/ Total 

Parenteral Nutrition/Amino Acids/Dextrose/ Fat Emulsion Intravenous 1,400 ml @  

58.333 mls/ hr TPN  CONT IV  Last administered on 4/2/20at 22:45;  Start 4/2/20 

at 22:00;  Stop 4/3/20 at 21:59;  Status DC


Sodium Chloride 1,000 ml @  1,000 mls/hr Q1H PRN IV hypotension;  Start 4/3/20 

at 07:30;  Stop 4/3/20 at 13:29;  Status DC


Albumin Human 200 ml @  200 mls/hr 1X PRN  PRN IV Hypotension Last administered 

on 4/3/20at 09:36;  Start 4/3/20 at 07:30;  Stop 4/3/20 at 13:29;  Status DC


Sodium Chloride (Normal Saline Flush) 10 ml 1X PRN  PRN IV AP catheter pack;  

Start 4/3/20 at 07:30;  Stop 4/3/20 at 21:29;  Status DC


Sodium Chloride (Normal Saline Flush) 10 ml 1X PRN  PRN IV  catheter pack;  

Start 4/3/20 at 07:30;  Stop 4/4/20 at 07:29;  Status DC


Sodium Chloride 1,000 ml @  400 mls/hr Q2H30M PRN IV PATENCY;  Start 4/3/20 at 

07:30;  Stop 4/3/20 at 19:29;  Status DC


Info (PHARMACY MONITORING -- do not chart) 1 each PRN DAILY  PRN MC SEE 

COMMENTS;  Start 4/3/20 at 07:30;  Stop 4/3/20 at 13:02;  Status DC


Info (PHARMACY MONITORING -- do not chart) 1 each PRN DAILY  PRN MC SEE 

COMMENTS;  Start 4/3/20 at 07:30;  Stop 4/5/20 at 12:45;  Status DC


Sodium Chloride 90 meq/Potassium Chloride 15 meq/ Potassium Phosphate 10 mmol/ 

Magnesium Sulfate 8 meq/Calcium Gluconate 15 meq/ Multivitamins 10 ml/Chromium/ 

Copper/Manganese/ Seleni/Zn 0.5 ml/ Insulin Human Regular 25 unit/ Total 

Parenteral Nutrition/Amino Acids/Dextrose/ Fat Emulsion Intravenous 1,400 ml @  

58.333 mls/ hr TPN  CONT IV  Last administered on 4/3/20at 22:19;  Start 4/3/20 

at 22:00;  Stop 4/4/20 at 21:59;  Status DC


Heparin Sodium (Porcine) (Heparin Sodium) 5,000 unit Q12HR SQ  Last administered

on 4/16/20at 21:10;  Start 4/3/20 at 21:00


Ondansetron HCl (Zofran) 4 mg PRN Q6HRS  PRN IV NAUSEA/VOMITING;  Start 4/6/20 

at 07:00;  Stop 4/7/20 at 06:59;  Status DC


Fentanyl Citrate (Fentanyl 2ml Vial) 25 mcg PRN Q5MIN  PRN IV MILD PAIN 1-3;  

Start 4/6/20 at 07:00;  Stop 4/7/20 at 06:59;  Status DC


Fentanyl Citrate (Fentanyl 2ml Vial) 50 mcg PRN Q5MIN  PRN IV MODERATE TO SEVERE

PAIN;  Start 4/6/20 at 07:00;  Stop 4/7/20 at 06:59;  Status DC


Ringer's Solution 1,000 ml @  30 mls/hr Q24H IV ;  Start 4/6/20 at 07:00;  Stop 

4/6/20 at 18:59;  Status DC


Lidocaine HCl (Xylocaine-Mpf 1% 2ml Vial) 2 ml PRN 1X  PRN ID PRIOR TO IV START;

 Start 4/6/20 at 07:00;  Stop 4/7/20 at 06:59;  Status DC


Prochlorperazine Edisylate (Compazine) 5 mg PACU PRN  PRN IV NAUSEA, MRX1;  

Start 4/6/20 at 07:00;  Stop 4/7/20 at 06:59;  Status DC


Sodium Chloride 1,000 ml @  1,000 mls/hr Q1H PRN IV hypotension;  Start 4/4/20 

at 09:10;  Stop 4/4/20 at 15:09;  Status DC


Albumin Human 200 ml @  200 mls/hr 1X PRN  PRN IV Hypotension Last administered 

on 4/4/20at 10:10;  Start 4/4/20 at 09:15;  Stop 4/4/20 at 15:14;  Status DC


Sodium Chloride 1,000 ml @  400 mls/hr Q2H30M PRN IV PATENCY;  Start 4/4/20 at 

09:10;  Stop 4/4/20 at 21:09;  Status DC


Info (PHARMACY MONITORING -- do not chart) 1 each PRN DAILY  PRN MC SEE 

COMMENTS;  Start 4/4/20 at 09:15;  Stop 4/5/20 at 12:45;  Status DC


Info (PHARMACY MONITORING -- do not chart) 1 each PRN DAILY  PRN MC SEE 

COMMENTS;  Start 4/4/20 at 09:15;  Stop 4/5/20 at 12:45;  Status DC


Sodium Chloride 90 meq/Potassium Chloride 15 meq/ Potassium Phosphate 10 mmol/ 

Magnesium Sulfate 8 meq/Calcium Gluconate 15 meq/ Multivitamins 10 ml/Chromium/ 

Copper/Manganese/ Seleni/Zn 0.5 ml/ Insulin Human Regular 25 unit/ Total 

Parenteral Nutrition/Amino Acids/Dextrose/ Fat Emulsion Intravenous 1,400 ml @  

58.333 mls/ hr TPN  CONT IV  Last administered on 4/4/20at 22:10;  Start 4/4/20 

at 22:00;  Stop 4/5/20 at 21:59;  Status DC


Magnesium Sulfate 50 ml @ 25 mls/hr PRN DAILY  PRN IV for Mag < 1.7 on am labs; 

Start 4/5/20 at 09:15


Sodium Chloride 90 meq/Potassium Chloride 15 meq/ Potassium Phosphate 10 mmol/ 

Magnesium Sulfate 8 meq/Calcium Gluconate 15 meq/ Multivitamins 10 ml/Chromium/ 

Copper/Manganese/ Seleni/Zn 0.5 ml/ Insulin Human Regular 25 unit/ Total 

Parenteral Nutrition/Amino Acids/Dextrose/ Fat Emulsion Intravenous 1,400 ml @  

58.333 mls/ hr TPN  CONT IV  Last administered on 4/5/20at 21:20;  Start 4/5/20 

at 22:00;  Stop 4/6/20 at 21:59;  Status DC


Sodium Chloride 1,000 ml @  1,000 mls/hr Q1H PRN IV hypotension;  Start 4/5/20 

at 12:23;  Stop 4/5/20 at 18:22;  Status DC


Albumin Human 200 ml @  200 mls/hr 1X  ONCE IV  Last administered on 4/5/20at 

13:34;  Start 4/5/20 at 12:30;  Stop 4/5/20 at 13:29;  Status DC


Diphenhydramine HCl (Benadryl) 25 mg 1X PRN  PRN IV ITCHING;  Start 4/5/20 at 

12:30;  Stop 4/6/20 at 12:29;  Status DC


Diphenhydramine HCl (Benadryl) 25 mg 1X PRN  PRN IV ITCHING;  Start 4/5/20 at 

12:30;  Stop 4/6/20 at 12:29;  Status DC


Info (PHARMACY MONITORING -- do not chart) 1 each PRN DAILY  PRN MC SEE 

COMMENTS;  Start 4/5/20 at 12:30;  Status Cancel


Bupivacaine HCl/ Epinephrine Bitart (Sensorcain-Epi 0.5%-1:[id number] Mpf) 30 ml 

STK-MED ONCE .ROUTE  Last administered on 4/6/20at 11:44;  Start 4/6/20 at 

11:00;  Stop 4/6/20 at 11:01;  Status DC


Cellulose (Surgicel Fibrillar 1x2) 1 each STK-MED ONCE .ROUTE ;  Start 4/6/20 at

11:00;  Stop 4/6/20 at 11:01;  Status DC


Sodium Chloride 90 meq/Potassium Chloride 15 meq/ Potassium Phosphate 10 mmol/ 

Magnesium Sulfate 12 meq/Calcium Gluconate 15 meq/ Multivitamins 10 ml/Chromium/

Copper/Manganese/ Seleni/Zn 0.5 ml/ Insulin Human Regular 25 unit/ Total 

Parenteral Nutrition/Amino Acids/Dextrose/ Fat Emulsion Intravenous 1,400 ml @  

58.333 mls/ hr TPN  CONT IV  Last administered on 4/6/20at 22:24;  Start 4/6/20 

at 22:00;  Stop 4/7/20 at 21:59;  Status DC


Propofol 20 ml @ As Directed STK-MED ONCE IV ;  Start 4/6/20 at 11:07;  Stop 

4/6/20 at 11:07;  Status DC


Cellulose (Surgicel Hemostat 4x8) 1 each STK-MED ONCE .ROUTE  Last administered 

on 4/6/20at 11:44;  Start 4/6/20 at 11:55;  Stop 4/6/20 at 11:56;  Status DC


Sevoflurane (Ultane) 60 ml STK-MED ONCE IH ;  Start 4/6/20 at 12:46;  Stop 

4/6/20 at 12:46;  Status DC


Sodium Chloride 1,000 ml @  1,000 mls/hr Q1H PRN IV hypotension;  Start 4/6/20 

at 13:51;  Stop 4/6/20 at 19:50;  Status DC


Albumin Human 200 ml @  200 mls/hr 1X PRN  PRN IV Hypotension Last administered 

on 4/6/20at 14:51;  Start 4/6/20 at 14:00;  Stop 4/6/20 at 19:59;  Status DC


Diphenhydramine HCl (Benadryl) 25 mg 1X PRN  PRN IV ITCHING;  Start 4/6/20 at 

14:00;  Stop 4/7/20 at 13:59;  Status DC


Diphenhydramine HCl (Benadryl) 25 mg 1X PRN  PRN IV ITCHING;  Start 4/6/20 at 

14:00;  Stop 4/7/20 at 13:59;  Status DC


Sodium Chloride 1,000 ml @  400 mls/hr Q2H30M PRN IV PATENCY;  Start 4/6/20 at 

13:51;  Stop 4/7/20 at 01:50;  Status DC


Info (PHARMACY MONITORING -- do not chart) 1 each PRN DAILY  PRN MC SEE 

COMMENTS;  Start 4/6/20 at 14:00;  Stop 4/9/20 at 08:16;  Status DC


Heparin Sodium (Porcine) (Hep Lock Adult) 500 unit STK-MED ONCE IVP ;  Start 

4/7/20 at 09:29;  Stop 4/7/20 at 09:30;  Status DC


Sodium Chloride 1,000 ml @  1,000 mls/hr Q1H PRN IV hypotension;  Start 4/7/20 

at 10:43;  Stop 4/7/20 at 16:42;  Status DC


Sodium Chloride 1,000 ml @  400 mls/hr Q2H30M PRN IV PATENCY;  Start 4/7/20 at 

10:43;  Stop 4/7/20 at 22:42;  Status DC


Info (PHARMACY MONITORING -- do not chart) 1 each PRN DAILY  PRN MC SEE 

COMMENTS;  Start 4/7/20 at 10:45;  Status UNV


Info (PHARMACY MONITORING -- do not chart) 1 each PRN DAILY  PRN MC SEE 

COMMENTS;  Start 4/7/20 at 10:45;  Status UNV


Sodium Chloride 90 meq/Potassium Chloride 15 meq/ Magnesium Sulfate 12 

meq/Calcium Gluconate 15 meq/ Multivitamins 10 ml/Chromium/ Copper/Manganese/ 

Seleni/Zn 0.5 ml/ Insulin Human Regular 25 unit/ Total Parenteral 

Nutrition/Amino Acids/Dextrose/ Fat Emulsion Intravenous 1,400 ml @  58.333 mls/

hr TPN  CONT IV  Last administered on 4/7/20at 22:13;  Start 4/7/20 at 22:00;  

Stop 4/8/20 at 21:59;  Status DC


Sodium Chloride 1,000 ml @  1,000 mls/hr Q1H PRN IV hypotension;  Start 4/8/20 

at 07:50;  Stop 4/8/20 at 13:49;  Status DC


Albumin Human 200 ml @  200 mls/hr 1X  ONCE IV ;  Start 4/8/20 at 08:00;  Stop 

4/8/20 at 08:53;  Status DC


Diphenhydramine HCl (Benadryl) 25 mg 1X PRN  PRN IV ITCHING;  Start 4/8/20 at 

08:00;  Stop 4/9/20 at 07:59;  Status DC


Diphenhydramine HCl (Benadryl) 25 mg 1X PRN  PRN IV ITCHING;  Start 4/8/20 at 

08:00;  Stop 4/9/20 at 07:59;  Status DC


Info (PHARMACY MONITORING -- do not chart) 1 each PRN DAILY  PRN MC SEE 

COMMENTS;  Start 4/8/20 at 08:00;  Stop 4/9/20 at 08:16;  Status DC


Albumin Human 50 ml @ 50 mls/hr 1X  ONCE IV ;  Start 4/8/20 at 08:53;  Stop 

4/8/20 at 08:56;  Status DC


Albumin Human 200 ml @  50 mls/hr PRN 1X  PRN IV HYPOTENSION Last administered 

on 4/14/20at 11:54;  Start 4/8/20 at 09:00


Meropenem 500 mg/ Sodium Chloride 50 ml @  100 mls/hr Q12H IV  Last administered

on 4/16/20at 23:04;  Start 4/8/20 at 10:00


Sodium Chloride 90 meq/Magnesium Sulfate 12 meq/ Calcium Gluconate 15 meq/ 

Multivitamins 10 ml/Chromium/ Copper/Manganese/ Seleni/Zn 0.5 ml/ Insulin Human 

Regular 25 unit/ Total Parenteral Nutrition/Amino Acids/Dextrose/ Fat Emulsion 

Intravenous 1,400 ml @  58.333 mls/ hr TPN  CONT IV  Last administered on 

4/8/20at 21:41;  Start 4/8/20 at 22:00;  Stop 4/9/20 at 21:59;  Status DC


Sodium Chloride 1,000 ml @  1,000 mls/hr Q1H PRN IV hypotension;  Start 4/9/20 

at 07:58;  Stop 4/9/20 at 13:57;  Status DC


Albumin Human 200 ml @  200 mls/hr 1X PRN  PRN IV Hypotension Last administered 

on 4/9/20at 09:30;  Start 4/9/20 at 08:00;  Stop 4/9/20 at 13:59;  Status DC


Sodium Chloride 1,000 ml @  400 mls/hr Q2H30M PRN IV PATENCY;  Start 4/9/20 at 

07:58;  Stop 4/9/20 at 19:57;  Status DC


Info (PHARMACY MONITORING -- do not chart) 1 each PRN DAILY  PRN MC SEE 

COMMENTS;  Start 4/9/20 at 08:00;  Status Cancel


Info (PHARMACY MONITORING -- do not chart) 1 each PRN DAILY  PRN MC SEE 

COMMENTS;  Start 4/9/20 at 08:15;  Status UNV


Sodium Chloride 90 meq/Potassium Phosphate 5 mmol/ Magnesium Sulfate 12 

meq/Calcium Gluconate 15 meq/ Multivitamins 10 ml/Chromium/ Copper/Manganese/ 

Seleni/Zn 0.5 ml/ Insulin Human Regular 30 unit/ Total Parenteral 

Nutrition/Amino Acids/Dextrose/ Fat Emulsion Intravenous 1,400 ml @  58.333 mls/

hr TPN  CONT IV  Last administered on 4/9/20at 22:08;  Start 4/9/20 at 22:00;  

Stop 4/10/20 at 21:59;  Status DC


Linezolid/Dextrose 300 ml @  300 mls/hr Q12HR IV  Last administered on 4/16/20at

21:09;  Start 4/10/20 at 11:00


Sodium Chloride 90 meq/Potassium Phosphate 15 mmol/ Magnesium Sulfate 12 

meq/Calcium Gluconate 15 meq/ Multivitamins 10 ml/Chromium/ Copper/Manganese/ 

Seleni/Zn 0.5 ml/ Insulin Human Regular 30 unit/ Total Parenteral Nutrition/Ami

no Acids/Dextrose/ Fat Emulsion Intravenous 1,400 ml @  58.333 mls/ hr TPN  CONT

IV  Last administered on 4/10/20at 21:49;  Start 4/10/20 at 22:00;  Stop 4/11/20

at 21:59;  Status DC


Sodium Chloride 90 meq/Potassium Phosphate 15 mmol/ Magnesium Sulfate 12 

meq/Calcium Gluconate 15 meq/ Multivitamins 10 ml/Chromium/ Copper/Manganese/ 

Seleni/Zn 0.5 ml/ Insulin Human Regular 40 unit/ Total Parenteral 

Nutrition/Amino Acids/Dextrose/ Fat Emulsion Intravenous 1,400 ml @  58.333 mls/

hr TPN  CONT IV  Last administered on 4/11/20at 21:21;  Start 4/11/20 at 22:00; 

Stop 4/12/20 at 21:59;  Status DC


Sodium Chloride 1,000 ml @  1,000 mls/hr Q1H PRN IV hypotension;  Start 4/11/20 

at 13:26;  Stop 4/11/20 at 19:25;  Status DC


Albumin Human 200 ml @  200 mls/hr 1X PRN  PRN IV Hypotension Last administered 

on 4/11/20at 15:00;  Start 4/11/20 at 13:30;  Stop 4/11/20 at 19:29;  Status DC


Sodium Chloride (Normal Saline Flush) 10 ml 1X PRN  PRN IV AP catheter pack;  

Start 4/11/20 at 13:30;  Stop 4/12/20 at 13:29;  Status DC


Sodium Chloride (Normal Saline Flush) 10 ml 1X PRN  PRN IV  catheter pack;  

Start 4/11/20 at 13:30;  Stop 4/12/20 at 13:29;  Status DC


Sodium Chloride 1,000 ml @  400 mls/hr Q2H30M PRN IV PATENCY;  Start 4/11/20 at 

13:26;  Stop 4/12/20 at 01:25;  Status DC


Info (PHARMACY MONITORING -- do not chart) 1 each PRN DAILY  PRN MC SEE 

COMMENTS;  Start 4/11/20 at 13:30;  Stop 4/11/20 at 13:33;  Status DC


Info (PHARMACY MONITORING -- do not chart) 1 each PRN DAILY  PRN MC SEE 

COMMENTS;  Start 4/11/20 at 13:30;  Stop 4/11/20 at 13:34;  Status DC


Sodium Chloride 90 meq/Potassium Phosphate 19 mmol/ Magnesium Sulfate 12 

meq/Calcium Gluconate 15 meq/ Multivitamins 10 ml/Chromium/ Copper/Manganese/ 

Seleni/Zn 0.5 ml/ Insulin Human Regular 40 unit/ Total Parenteral Nutrition/Amin

o Acids/Dextrose/ Fat Emulsion Intravenous 1,400 ml @  58.333 mls/ hr TPN  CONT 

IV  Last administered on 4/12/20at 21:54;  Start 4/12/20 at 22:00;  Stop 4/13/20

at 21:59;  Status DC


Sodium Chloride 1,000 ml @  1,000 mls/hr Q1H PRN IV hypotension;  Start 4/13/20 

at 09:35;  Stop 4/13/20 at 15:34;  Status DC


Albumin Human 200 ml @  200 mls/hr 1X PRN  PRN IV Hypotension;  Start 4/13/20 at

09:45;  Stop 4/13/20 at 15:44;  Status DC


Diphenhydramine HCl (Benadryl) 25 mg 1X PRN  PRN IV ITCHING;  Start 4/13/20 at 

09:45;  Stop 4/14/20 at 09:44;  Status DC


Diphenhydramine HCl (Benadryl) 25 mg 1X PRN  PRN IV ITCHING;  Start 4/13/20 at 

09:45;  Stop 4/14/20 at 09:44;  Status DC


Sodium Chloride 1,000 ml @  400 mls/hr Q2H30M PRN IV PATENCY;  Start 4/13/20 at 

09:35;  Stop 4/13/20 at 21:34;  Status DC


Info (PHARMACY MONITORING -- do not chart) 1 each PRN DAILY  PRN MC SEE 

COMMENTS;  Start 4/13/20 at 09:45;  Status Cancel


Sodium Chloride 100 meq/Potassium Phosphate 19 mmol/ Magnesium Sulfate 12 

meq/Calcium Gluconate 15 meq/ Multivitamins 10 ml/Chromium/ Copper/Manganese/ 

Seleni/Zn 0.5 ml/ Insulin Human Regular 40 unit/ Potassium Chloride 20 meq/ 

Total Parenteral Nutrition/Amino Acids/Dextrose/ Fat Emulsion Intravenous 1,400 

ml @  58.333 mls/ hr TPN  CONT IV  Last administered on 4/13/20at 22:02;  Start 

4/13/20 at 22:00;  Stop 4/14/20 at 21:59;  Status DC


Furosemide (Lasix) 40 mg 1X  ONCE IVP  Last administered on 4/13/20at 14:39;  

Start 4/13/20 at 14:30;  Stop 4/13/20 at 14:31;  Status DC


Metronidazole 100 ml @  100 mls/hr Q8HRS IV  Last administered on 4/17/20at 

06:07;  Start 4/14/20 at 10:00


Sodium Chloride 1,000 ml @  1,000 mls/hr Q1H PRN IV hypotension;  Start 4/14/20 

at 08:00;  Stop 4/14/20 at 13:59;  Status DC


Albumin Human 200 ml @  200 mls/hr 1X PRN  PRN IV Hypotension;  Start 4/14/20 at

08:00;  Stop 4/14/20 at 13:59;  Status DC


Sodium Chloride 1,000 ml @  400 mls/hr Q2H30M PRN IV PATENCY;  Start 4/14/20 at 

08:00;  Stop 4/14/20 at 19:59;  Status DC


Info (PHARMACY MONITORING -- do not chart) 1 each PRN DAILY  PRN MC SEE COMME

NTS;  Start 4/14/20 at 11:30;  Status UNV


Info (PHARMACY MONITORING -- do not chart) 1 each PRN DAILY  PRN MC SEE 

COMMENTS;  Start 4/14/20 at 11:30;  Stop 4/16/20 at 12:13;  Status DC


Sodium Chloride 100 meq/Potassium Phosphate 19 mmol/ Magnesium Sulfate 12 

meq/Calcium Gluconate 15 meq/ Multivitamins 10 ml/Chromium/ Copper/Manganese/ 

Seleni/Zn 0.5 ml/ Insulin Human Regular 40 unit/ Potassium Chloride 20 meq/ 

Total Parenteral Nutrition/Amino Acids/Dextrose/ Fat Emulsion Intravenous 1,400 

ml @  58.333 mls/ hr TPN  CONT IV  Last administered on 4/14/20at 21:52;  Start 

4/14/20 at 22:00;  Stop 4/15/20 at 21:59;  Status DC


Sodium Chloride (Normal Saline Flush) 10 ml QSHIFT  PRN IV AFTER MEDS AND BLOOD 

DRAWS;  Start 4/14/20 at 15:00


Sodium Chloride (Normal Saline Flush) 10 ml PRN Q5MIN  PRN IV AFTER MEDS AND 

BLOOD DRAWS;  Start 4/14/20 at 15:00


Sodium Chloride (Normal Saline Flush) 20 ml PRN Q5MIN  PRN IV AFTER MEDS AND 

BLOOD DRAWS;  Start 4/14/20 at 15:00


Sodium Chloride 100 meq/Potassium Phosphate 19 mmol/ Magnesium Sulfate 12 

meq/Calcium Gluconate 15 meq/ Multivitamins 10 ml/Chromium/ Copper/Manganese/ 

Seleni/Zn 0.5 ml/ Insulin Human Regular 40 unit/ Potassium Chloride 20 meq/ 

Total Parenteral Nutrition/Amino Acids/Dextrose/ Fat Emulsion Intravenous 1,400 

ml @  58.333 mls/ hr TPN  CONT IV  Last administered on 4/15/20at 21:20;  Start 

4/15/20 at 22:00;  Stop 4/16/20 at 21:59;  Status DC


Lidocaine HCl (Buffered Lidocaine 1%) 3 ml STK-MED ONCE .ROUTE ;  Start 4/15/20 

at 13:16;  Stop 4/15/20 at 13:16;  Status DC


Lidocaine HCl (Buffered Lidocaine 1%) 6 ml 1X  ONCE INJ  Last administered on 

4/15/20at 13:45;  Start 4/15/20 at 13:30;  Stop 4/15/20 at 13:31;  Status DC


Albumin Human 100 ml @  100 mls/hr 1X  ONCE IV  Last administered on 4/15/20at 

15:41;  Start 4/15/20 at 15:00;  Stop 4/15/20 at 15:59;  Status DC


Albumin Human 50 ml @ 50 mls/hr 1X  ONCE IV  Last administered on 4/15/20at 

15:00;  Start 4/15/20 at 15:00;  Stop 4/15/20 at 15:59;  Status DC


Info (PHARMACY MONITORING -- do not chart) 1 each PRN DAILY  PRN MC SEE CO

MMENTS;  Start 4/16/20 at 11:30


Info (PHARMACY MONITORING -- do not chart) 1 each PRN DAILY  PRN MC SEE 

COMMENTS;  Start 4/16/20 at 11:30;  Status UNV


Sodium Chloride 100 meq/Potassium Phosphate 10 mmol/ Magnesium Sulfate 12 

meq/Calcium Gluconate 15 meq/ Multivitamins 10 ml/Chromium/ Copper/Manganese/ 

Seleni/Zn 0.5 ml/ Insulin Human Regular 35 unit/ Potassium Chloride 20 meq/ 

Total Parenteral Nutrition/Amino Acids/Dextrose/ Fat Emulsion Intravenous 1,400 

ml @  58.333 mls/ hr TPN  CONT IV  Last administered on 4/16/20at 22:10;  Start 

4/16/20 at 22:00;  Stop 4/17/20 at 21:59





Active Scripts


Active


Reported


Bisoprolol Fumarate 5 Mg Tablet 10 Mg PO DAILY


Vitals/I & O





Vital Sign - Last 24 Hours








 4/16/20 4/16/20 4/16/20 4/16/20





 11:58 13:07 14:15 15:00


 


Temp   100.8 





   100.8 


 


Pulse   82 76


 


Resp   22 21


 


B/P (MAP)   140/85 (103) 100/54 (69)


 


Pulse Ox 99 99  


 


O2 Delivery Ventilator  Ventilator Ventilator


 


    





    





 4/16/20 4/16/20 4/16/20 4/16/20





 15:30 15:49 16:00 16:00


 


Pulse 79   


 


Resp 20   


 


B/P (MAP) 77/49 (58)   


 


Pulse Ox  100  


 


O2 Delivery Ventilator Ventilator Mechanical Ventilator Mechanical Ventilator





 4/16/20 4/16/20 4/16/20 4/16/20





 16:00 17:00 19:00 20:00


 


Pulse 66  82 


 


Resp 18  22 


 


B/P (MAP) 113/66 (82)  114/64 (81) 


 


Pulse Ox   99 100


 


O2 Delivery Ventilator Ventilator Ventilator Ventilator





 4/16/20 4/16/20 4/16/20 4/16/20





 20:00 20:00 21:00 22:00


 


Temp  101.8  





  101.8  


 


Pulse  96  81


 


Resp  24 18 18


 


B/P (MAP)  141/81 (101) 91/49 (63) 115/68 (84)


 


Pulse Ox  99 100 100


 


O2 Delivery Mechanical Ventilator Ventilator Ventilator Ventilator


 


    





    





 4/16/20 4/16/20 4/17/20 4/17/20





 22:05 23:01 00:00 00:00


 


Temp    100.9





    100.9


 


Pulse  80  82


 


Resp  18  20


 


B/P (MAP)  101/58 (72)  102/71 (81)


 


Pulse Ox 100 100  99


 


O2 Delivery Ventilator Ventilator Mechanical Ventilator Ventilator


 


    





    





 4/17/20 4/17/20 4/17/20 4/17/20





 01:00 01:40 02:00 03:00


 


Pulse 81  84 88


 


Resp 18  18 18


 


B/P (MAP) 104/67 (79)  115/63 (80) 119/69 (86)


 


Pulse Ox 99 100 99 99


 


O2 Delivery Ventilator Ventilator Ventilator Ventilator





 4/17/20 4/17/20 4/17/20 4/17/20





 03:31 04:00 04:00 05:00


 


Temp   98.1 





   98.1 


 


Pulse   91 83


 


Resp   18 18


 


B/P (MAP)   122/70 (87) 108/59 (75)


 


Pulse Ox 100  99 100


 


O2 Delivery Ventilator Mechanical Ventilator Ventilator Ventilator


 


    





    





 4/17/20 4/17/20 4/17/20 





 05:33 06:00 07:37 


 


Pulse  80  


 


Resp  18  


 


B/P (MAP)  174/82 (112)  


 


Pulse Ox 100 100 100 


 


O2 Delivery Ventilator Ventilator Ventilator 














Intake and Output   


 


 4/16/20 4/16/20 4/17/20





 15:00 23:00 07:00


 


Intake Total  2202 ml 1102.2 ml


 


Output Total 250 ml 485 ml 350 ml


 


Balance -250 ml 1717 ml 752.2 ml

















CLEMENTINA PANTOJA MD           Apr 17, 2020 09:43

## 2020-04-17 NOTE — PDOC
Infectious Disease Note


Subjective


Subjective


Alert and some better 


Mild nausea and some discomfort


Trach, vent FiO2 35 % PEEP 5


TPN





ROS


ROS


difficult to really obtain





Vital Sign


Vital Signs





Vital Signs








  Date Time  Temp Pulse Resp B/P (MAP) Pulse Ox O2 Delivery O2 Flow Rate FiO2


 


4/17/20 06:00  80 18 174/82 (112) 100 Ventilator  


 


4/17/20 04:00 98.1       





 98.1       











Physical Exam


PHYSICAL EXAM


GENERAL: Alert and coop. mouthing words  Appears comfortable has generalized 

anasarca - s/p suctioning with RT. Feels warm


HEENT: Pupils equal, + NGT, oral cavity dry 


NECK:  Trach/vent 


LUNGS: Diminished aeration 


HEART:  S1, S2, regular 


ABDOMEN:  Less Distended, hypoactive BS, 


: Pepe  changed 4/14


EXTREMITIES: Generalized edema, no cyanosis, SCDs bilaterally 


DERMATOLOGIC:  Warm and dry.  No generalized rash.  


CENTRAL NERVOUS SYSTEM: Responsive and seems appropriate


HDC, LIJ (4/14) and


RUE-PICC removed 4/14





Labs


Lab





Laboratory Tests








Test


 4/16/20


10:00 4/16/20


19:31 4/16/20


23:53 4/17/20


06:00


 


White Blood Count


 9.2 x10^3/uL


(4.0-11.0) 


 


 





 


Red Blood Count


 2.76 x10^6/uL


(3.50-5.40) 


 


 





 


Hemoglobin


 8.9 g/dL


(12.0-15.5) 


 


 





 


Hematocrit


 26.5 %


(36.0-47.0) 


 


 





 


Mean Corpuscular Volume 96 fL ()    


 


Mean Corpuscular Hemoglobin 32 pg (25-35)    


 


Mean Corpuscular Hemoglobin


Concent 33 g/dL


(31-37) 


 


 





 


Red Cell Distribution Width


 18.9 %


(11.5-14.5) 


 


 





 


Platelet Count


 530 x10^3/uL


(140-400) 


 


 





 


Neutrophils (%) (Auto) 66 % (31-73)    


 


Lymphocytes (%) (Auto) 21 % (24-48)    


 


Monocytes (%) (Auto) 12 % (0-9)    


 


Eosinophils (%) (Auto) 0 % (0-3)    


 


Basophils (%) (Auto) 1 % (0-3)    


 


Neutrophils # (Auto)


 6.1 x10^3/uL


(1.8-7.7) 


 


 





 


Lymphocytes # (Auto)


 2.0 x10^3/uL


(1.0-4.8) 


 


 





 


Monocytes # (Auto)


 1.1 x10^3/uL


(0.0-1.1) 


 


 





 


Eosinophils # (Auto)


 0.0 x10^3/uL


(0.0-0.7) 


 


 





 


Basophils # (Auto)


 0.0 x10^3/uL


(0.0-0.2) 


 


 





 


Glucose (Fingerstick)


 


 142 mg/dL


(70-99) 145 mg/dL


(70-99) 





 


Sodium Level


 


 


 


 140 mmol/L


(136-145)


 


Potassium Level


 


 


 


 3.7 mmol/L


(3.5-5.1)


 


Chloride Level


 


 


 


 103 mmol/L


()


 


Carbon Dioxide Level


 


 


 


 26 mmol/L


(21-32)


 


Anion Gap    11 (6-14) 


 


Blood Urea Nitrogen


 


 


 


 80 mg/dL


(7-20)


 


Creatinine


 


 


 


 1.6 mg/dL


(0.6-1.0)


 


Estimated GFR


(Cockcroft-Gault) 


 


 


 34.3 





 


BUN/Creatinine Ratio    50 (6-20) 


 


Glucose Level


 


 


 


 130 mg/dL


(70-99)


 


Calcium Level


 


 


 


 8.4 mg/dL


(8.5-10.1)


 


Phosphorus Level


 


 


 


 5.2 mg/dL


(2.6-4.7)


 


Total Bilirubin


 


 


 


 0.4 mg/dL


(0.2-1.0)


 


Aspartate Amino Transf


(AST/SGOT) 


 


 


 23 U/L (15-37) 





 


Alanine Aminotransferase


(ALT/SGPT) 


 


 


 12 U/L (14-59) 





 


Alkaline Phosphatase


 


 


 


 52 U/L


()


 


Total Protein


 


 


 


 4.9 g/dL


(6.4-8.2)


 


Albumin


 


 


 


 2.6 g/dL


(3.4-5.0)


 


Albumin/Globulin Ratio    1.1 (1.0-1.7) 


 


Test


 4/17/20


06:06 4/17/20


07:00 


 





 


Glucose (Fingerstick)


 129 mg/dL


(70-99) 


 


 





 


White Blood Count


 


 5.8 x10^3/uL


(4.0-11.0) 


 





 


Red Blood Count


 


 2.40 x10^6/uL


(3.50-5.40) 


 





 


Hemoglobin


 


 7.6 g/dL


(12.0-15.5) 


 





 


Hematocrit


 


 22.8 %


(36.0-47.0) 


 





 


Mean Corpuscular Volume  95 fL ()   


 


Mean Corpuscular Hemoglobin  32 pg (25-35)   


 


Mean Corpuscular Hemoglobin


Concent 


 33 g/dL


(31-37) 


 





 


Red Cell Distribution Width


 


 18.4 %


(11.5-14.5) 


 





 


Platelet Count


 


 498 x10^3/uL


(140-400) 


 





 


Neutrophils (%) (Auto)  67 % (31-73)   


 


Lymphocytes (%) (Auto)  17 % (24-48)   


 


Monocytes (%) (Auto)  14 % (0-9)   


 


Eosinophils (%) (Auto)  1 % (0-3)   


 


Basophils (%) (Auto)  1 % (0-3)   


 


Neutrophils # (Auto)


 


 3.9 x10^3/uL


(1.8-7.7) 


 





 


Lymphocytes # (Auto)


 


 1.0 x10^3/uL


(1.0-4.8) 


 





 


Monocytes # (Auto)


 


 0.8 x10^3/uL


(0.0-1.1) 


 





 


Eosinophils # (Auto)


 


 0.0 x10^3/uL


(0.0-0.7) 


 





 


Basophils # (Auto)


 


 0.0 x10^3/uL


(0.0-0.2) 


 











Micro


CT Chest Abd/pelv 4/14


CT CHEST:


CT scan the chest was done without contrast. There is a tracheostomy tube 


in good position. There are large bilateral pleural effusions. There is 


atelectasis in both lung bases. There is no mediastinal adenopathy. Right 


arm PICC line extends to the superior vena cava. There is a temporary 


dialysis catheter extending to the vena cava near the atrium.


 


IMPRESSION:


1. Large bilateral effusions.


2. Atelectasis of both lungs.


 


End impression


 


CT abdomen pelvis


 


CT scan the abdomen pelvis was done following CT chest with contrast. NG 


tube extends into the stomach. Spleen is normal. There is no mass or 


hydronephrosis in the kidneys. There is a gallstone the gallbladder. A 


focal liver lesion is not identified. There is diffuse necrosis of the 


pancreas. There is peripancreatic fluid. The pattern is similar to the 


recent study. There is fluid in the paracolic gutters. There is 


retroperitoneal fluid surrounding the kidneys. Ascites extends into the 


pelvis. Ascites is similar to the prior study. There is no bowel 


obstruction. There is no free air.


 


IMPRESSION:


1. Diffuse pancreatic necrosis.


2. A extensive retroperitoneal fluid and inflammation.


3. Cholelithiasis.


4. Moderate to large volume ascites with little change.



































CT A/P, 4/9


IMPRESSION:


1. Increased ascites.


2. 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


at the pancreas


3. Cholelithiasis with thickening of the gallbladder wall.


4. Persistent pleural effusions and atelectasis in the lung bases.





Objective


Assessment








Leukocytosis 4/10 -improved 


Fever - - better - ? pancreatitis. blood cults 4/10 neg.Urine - neg, New L IJ/

Pepe and PICC removed 4/14


S/p Paracentesis 4/15 - 4300 ml


Severe acute gallstone pancreatitis with necrosis


   -CT 4/14 necrotizing pancreatitis with fluid and phlegmon at the pancreas


    Ascites


  


Hypotension off levaphed 


Julian Pleural effusions


JED requiring HD 


   - s/p RIJ temporary dialysis catheter replacement, 4/2


Vent dependent respiratory failure


   -s/p Trach placement 


   -lung opacities, COVID-19 neg 


Anasarca - worse


Anemia - S/p PRBC 3/26


Hypocalcemia 


Prediabetes


HTN


Diarrhea, C. diff neg 3/23


Anemia - S/p PRBCs





Plan


Plan of Care


labs this am


F/u Blood cults 4/14


Changed LIJ and pulled PICC line (HD not changed) and changed pepe 4/14


cont merrem (4/8) and Zyvox (4/10) and micafungin


Added Flagyl 4/14


-previously on cefepime, flagyl & dapto 


Gen surgery following


f/u BC from 4/9 neg to date 


Maintain aspiration precautions 


Anemia deferred to primary





Critically ill





D/w Dr. Stephens/Mark and Eyad


D/w nursing











RASHAWN ROSEN MD              Apr 17, 2020 08:30

## 2020-04-17 NOTE — PDOC
SURGICAL PROGRESS NOTE


Subjective


Pt awake alert and responsive


Vital Signs





Vital Signs








  Date Time  Temp Pulse Resp B/P (MAP) Pulse Ox O2 Delivery O2 Flow Rate FiO2


 


4/17/20 07:37     100 Ventilator  


 


4/17/20 06:00  80 18 174/82 (112)    


 


4/17/20 04:00 98.1       





 98.1       








I&O











Intake and Output 


 


 4/17/20





 07:00


 


Intake Total 3304.2 ml


 


Output Total 1085 ml


 


Balance 2219.2 ml


 


 


 


IV Total 3304.2 ml


 


Output Urine Total 1085 ml








General:  Alert, No acute distress


Abdomen:  Soft, No tenderness


Labs





Laboratory Tests








Test


 4/15/20


11:57 4/15/20


12:35 4/15/20


23:48 4/16/20


06:15


 


Glucose (Fingerstick)


 164 mg/dL


(70-99) 


 158 mg/dL


(70-99) 





 


Prothrombin Time


 


 15.6 SEC


(11.7-14.0) 


 





 


Prothromb Time International


Ratio 


 1.3 (0.8-1.1) 


 


 





 


Sodium Level


 


 


 


 139 mmol/L


(136-145)


 


Potassium Level


 


 


 


 3.9 mmol/L


(3.5-5.1)


 


Chloride Level


 


 


 


 102 mmol/L


()


 


Carbon Dioxide Level


 


 


 


 25 mmol/L


(21-32)


 


Anion Gap    12 (6-14) 


 


Blood Urea Nitrogen


 


 


 


 88 mg/dL


(7-20)


 


Creatinine


 


 


 


 1.8 mg/dL


(0.6-1.0)


 


Estimated GFR


(Cockcroft-Gault) 


 


 


 29.9 





 


Glucose Level


 


 


 


 120 mg/dL


(70-99)


 


Calcium Level


 


 


 


 9.1 mg/dL


(8.5-10.1)


 


Phosphorus Level


 


 


 


 5.1 mg/dL


(2.6-4.7)


 


Magnesium Level


 


 


 


 2.0 mg/dL


(1.8-2.4)


 


Test


 4/16/20


06:18 4/16/20


08:00 4/16/20


10:00 4/16/20


19:31


 


Glucose (Fingerstick)


 106 mg/dL


(70-99) 


 


 142 mg/dL


(70-99)


 


O2 Saturation  97 % (92-99)   


 


Arterial Blood pH


 


 7.45


(7.35-7.45) 


 





 


Arterial Blood pH (Temp


corrected) 


 7.43 


 


 





 


Arterial Blood pCO2 at


Patient Temp 


 33 mmHg


(35-46) 


 





 


Arterial Blood pCO2 (Temp


correct) 


 35 mmHg 


 


 





 


Arterial Blood pO2 at Patient


Temp 


 103 mmHg


() 


 





 


Arterial Blood pO2 (Temp


corrected) 


 114 mmHg 


 


 





 


Arterial Blood HCO3


 


 22 mmol/L


(21-28) 


 





 


Arterial Blood Base Excess


 


 -1 mmol/L


(-3-3) 


 





 


FiO2  35% vent   


 


White Blood Count


 


 


 9.2 x10^3/uL


(4.0-11.0) 





 


Red Blood Count


 


 


 2.76 x10^6/uL


(3.50-5.40) 





 


Hemoglobin


 


 


 8.9 g/dL


(12.0-15.5) 





 


Hematocrit


 


 


 26.5 %


(36.0-47.0) 





 


Mean Corpuscular Volume   96 fL ()  


 


Mean Corpuscular Hemoglobin   32 pg (25-35)  


 


Mean Corpuscular Hemoglobin


Concent 


 


 33 g/dL


(31-37) 





 


Red Cell Distribution Width


 


 


 18.9 %


(11.5-14.5) 





 


Platelet Count


 


 


 530 x10^3/uL


(140-400) 





 


Neutrophils (%) (Auto)   66 % (31-73)  


 


Lymphocytes (%) (Auto)   21 % (24-48)  


 


Monocytes (%) (Auto)   12 % (0-9)  


 


Eosinophils (%) (Auto)   0 % (0-3)  


 


Basophils (%) (Auto)   1 % (0-3)  


 


Neutrophils # (Auto)


 


 


 6.1 x10^3/uL


(1.8-7.7) 





 


Lymphocytes # (Auto)


 


 


 2.0 x10^3/uL


(1.0-4.8) 





 


Monocytes # (Auto)


 


 


 1.1 x10^3/uL


(0.0-1.1) 





 


Eosinophils # (Auto)


 


 


 0.0 x10^3/uL


(0.0-0.7) 





 


Basophils # (Auto)


 


 


 0.0 x10^3/uL


(0.0-0.2) 





 


Test


 4/16/20


23:53 4/17/20


06:00 4/17/20


06:06 4/17/20


07:00


 


Glucose (Fingerstick)


 145 mg/dL


(70-99) 


 129 mg/dL


(70-99) 





 


Sodium Level


 


 140 mmol/L


(136-145) 


 





 


Potassium Level


 


 3.7 mmol/L


(3.5-5.1) 


 





 


Chloride Level


 


 103 mmol/L


() 


 





 


Carbon Dioxide Level


 


 26 mmol/L


(21-32) 


 





 


Anion Gap  11 (6-14)   


 


Blood Urea Nitrogen


 


 80 mg/dL


(7-20) 


 





 


Creatinine


 


 1.6 mg/dL


(0.6-1.0) 


 





 


Estimated GFR


(Cockcroft-Gault) 


 34.3 


 


 





 


BUN/Creatinine Ratio  50 (6-20)   


 


Glucose Level


 


 130 mg/dL


(70-99) 


 





 


Calcium Level


 


 8.4 mg/dL


(8.5-10.1) 


 





 


Phosphorus Level


 


 5.2 mg/dL


(2.6-4.7) 


 





 


Total Bilirubin


 


 0.4 mg/dL


(0.2-1.0) 


 





 


Aspartate Amino Transf


(AST/SGOT) 


 23 U/L (15-37) 


 


 





 


Alanine Aminotransferase


(ALT/SGPT) 


 12 U/L (14-59) 


 


 





 


Alkaline Phosphatase


 


 52 U/L


() 


 





 


Total Protein


 


 4.9 g/dL


(6.4-8.2) 


 





 


Albumin


 


 2.6 g/dL


(3.4-5.0) 


 





 


Albumin/Globulin Ratio  1.1 (1.0-1.7)   


 


White Blood Count


 


 


 


 5.8 x10^3/uL


(4.0-11.0)


 


Red Blood Count


 


 


 


 2.40 x10^6/uL


(3.50-5.40)


 


Hemoglobin


 


 


 


 7.6 g/dL


(12.0-15.5)


 


Hematocrit


 


 


 


 22.8 %


(36.0-47.0)


 


Mean Corpuscular Volume    95 fL () 


 


Mean Corpuscular Hemoglobin    32 pg (25-35) 


 


Mean Corpuscular Hemoglobin


Concent 


 


 


 33 g/dL


(31-37)


 


Red Cell Distribution Width


 


 


 


 18.4 %


(11.5-14.5)


 


Platelet Count


 


 


 


 498 x10^3/uL


(140-400)


 


Neutrophils (%) (Auto)    67 % (31-73) 


 


Lymphocytes (%) (Auto)    17 % (24-48) 


 


Monocytes (%) (Auto)    14 % (0-9) 


 


Eosinophils (%) (Auto)    1 % (0-3) 


 


Basophils (%) (Auto)    1 % (0-3) 


 


Neutrophils # (Auto)


 


 


 


 3.9 x10^3/uL


(1.8-7.7)


 


Lymphocytes # (Auto)


 


 


 


 1.0 x10^3/uL


(1.0-4.8)


 


Monocytes # (Auto)


 


 


 


 0.8 x10^3/uL


(0.0-1.1)


 


Eosinophils # (Auto)


 


 


 


 0.0 x10^3/uL


(0.0-0.7)


 


Basophils # (Auto)


 


 


 


 0.0 x10^3/uL


(0.0-0.2)


 


Test


 4/17/20


08:00 


 


 





 


O2 Saturation 98 % (92-99)    


 


Arterial Blood pH


 7.47


(7.35-7.45) 


 


 





 


Arterial Blood pCO2 at


Patient Temp 33 mmHg


(35-46) 


 


 





 


Arterial Blood pO2 at Patient


Temp 136 mmHg


() 


 


 





 


Arterial Blood HCO3


 24 mmol/L


(21-28) 


 


 





 


Arterial Blood Base Excess


 0 mmol/L


(-3-3) 


 


 





 


FiO2 35    








Laboratory Tests








Test


 4/16/20


19:31 4/16/20


23:53 4/17/20


06:00 4/17/20


06:06


 


Glucose (Fingerstick)


 142 mg/dL


(70-99) 145 mg/dL


(70-99) 


 129 mg/dL


(70-99)


 


Sodium Level


 


 


 140 mmol/L


(136-145) 





 


Potassium Level


 


 


 3.7 mmol/L


(3.5-5.1) 





 


Chloride Level


 


 


 103 mmol/L


() 





 


Carbon Dioxide Level


 


 


 26 mmol/L


(21-32) 





 


Anion Gap   11 (6-14)  


 


Blood Urea Nitrogen


 


 


 80 mg/dL


(7-20) 





 


Creatinine


 


 


 1.6 mg/dL


(0.6-1.0) 





 


Estimated GFR


(Cockcroft-Gault) 


 


 34.3 


 





 


BUN/Creatinine Ratio   50 (6-20)  


 


Glucose Level


 


 


 130 mg/dL


(70-99) 





 


Calcium Level


 


 


 8.4 mg/dL


(8.5-10.1) 





 


Phosphorus Level


 


 


 5.2 mg/dL


(2.6-4.7) 





 


Total Bilirubin


 


 


 0.4 mg/dL


(0.2-1.0) 





 


Aspartate Amino Transf


(AST/SGOT) 


 


 23 U/L (15-37) 


 





 


Alanine Aminotransferase


(ALT/SGPT) 


 


 12 U/L (14-59) 


 





 


Alkaline Phosphatase


 


 


 52 U/L


() 





 


Total Protein


 


 


 4.9 g/dL


(6.4-8.2) 





 


Albumin


 


 


 2.6 g/dL


(3.4-5.0) 





 


Albumin/Globulin Ratio   1.1 (1.0-1.7)  


 


Test


 4/17/20


07:00 4/17/20


08:00 


 





 


White Blood Count


 5.8 x10^3/uL


(4.0-11.0) 


 


 





 


Red Blood Count


 2.40 x10^6/uL


(3.50-5.40) 


 


 





 


Hemoglobin


 7.6 g/dL


(12.0-15.5) 


 


 





 


Hematocrit


 22.8 %


(36.0-47.0) 


 


 





 


Mean Corpuscular Volume 95 fL ()    


 


Mean Corpuscular Hemoglobin 32 pg (25-35)    


 


Mean Corpuscular Hemoglobin


Concent 33 g/dL


(31-37) 


 


 





 


Red Cell Distribution Width


 18.4 %


(11.5-14.5) 


 


 





 


Platelet Count


 498 x10^3/uL


(140-400) 


 


 





 


Neutrophils (%) (Auto) 67 % (31-73)    


 


Lymphocytes (%) (Auto) 17 % (24-48)    


 


Monocytes (%) (Auto) 14 % (0-9)    


 


Eosinophils (%) (Auto) 1 % (0-3)    


 


Basophils (%) (Auto) 1 % (0-3)    


 


Neutrophils # (Auto)


 3.9 x10^3/uL


(1.8-7.7) 


 


 





 


Lymphocytes # (Auto)


 1.0 x10^3/uL


(1.0-4.8) 


 


 





 


Monocytes # (Auto)


 0.8 x10^3/uL


(0.0-1.1) 


 


 





 


Eosinophils # (Auto)


 0.0 x10^3/uL


(0.0-0.7) 


 


 





 


Basophils # (Auto)


 0.0 x10^3/uL


(0.0-0.2) 


 


 





 


O2 Saturation  98 % (92-99)   


 


Arterial Blood pH


 


 7.47


(7.35-7.45) 


 





 


Arterial Blood pCO2 at


Patient Temp 


 33 mmHg


(35-46) 


 





 


Arterial Blood pO2 at Patient


Temp 


 136 mmHg


() 


 





 


Arterial Blood HCO3


 


 24 mmol/L


(21-28) 


 





 


Arterial Blood Base Excess


 


 0 mmol/L


(-3-3) 


 





 


FiO2  35   








Problem List


Problems


Medical Problems:


(1) Acute pancreatitis


Status: Acute  





(2) Cholelithiasis


Status: Acute  








Assessment/Plan


appears to be improving


will clamp NGT and if tolerates work towards d/c


plan speech eval pending this for swallowing and consideration of starting PO











GAMAL ZHOU MD             Apr 17, 2020 10:03

## 2020-04-17 NOTE — PDOC
Renal-Progress Notes


Subjective Notes


Notes


NO NEW COMPLAINTS, STILL HAVING IRRITATION WITH ALL THE TUBES





History of Present Illness


Hx of present illness


MORE AWAKE





Vitals


Vitals





Vital Signs








  Date Time  Temp Pulse Resp B/P (MAP) Pulse Ox O2 Delivery O2 Flow Rate FiO2


 


4/17/20 10:51     100 Ventilator  


 


4/17/20 06:00  80 18 174/82 (112)    


 


4/17/20 04:00 98.1       





 98.1       








Weight


Weight [ ]





I.O.


Intake and Output











Intake and Output 


 


 4/17/20





 07:00


 


Intake Total 3304.2 ml


 


Output Total 1085 ml


 


Balance 2219.2 ml


 


 


 


IV Total 3304.2 ml


 


Output Urine Total 1085 ml











Labs


Labs





Laboratory Tests








Test


 4/16/20


19:31 4/16/20


23:53 4/17/20


06:00 4/17/20


06:06


 


Glucose (Fingerstick)


 142 mg/dL


(70-99) 145 mg/dL


(70-99) 


 129 mg/dL


(70-99)


 


Sodium Level


 


 


 140 mmol/L


(136-145) 





 


Potassium Level


 


 


 3.7 mmol/L


(3.5-5.1) 





 


Chloride Level


 


 


 103 mmol/L


() 





 


Carbon Dioxide Level


 


 


 26 mmol/L


(21-32) 





 


Anion Gap   11 (6-14)  


 


Blood Urea Nitrogen


 


 


 80 mg/dL


(7-20) 





 


Creatinine


 


 


 1.6 mg/dL


(0.6-1.0) 





 


Estimated GFR


(Cockcroft-Gault) 


 


 34.3 


 





 


BUN/Creatinine Ratio   50 (6-20)  


 


Glucose Level


 


 


 130 mg/dL


(70-99) 





 


Calcium Level


 


 


 8.4 mg/dL


(8.5-10.1) 





 


Phosphorus Level


 


 


 5.2 mg/dL


(2.6-4.7) 





 


Total Bilirubin


 


 


 0.4 mg/dL


(0.2-1.0) 





 


Aspartate Amino Transf


(AST/SGOT) 


 


 23 U/L (15-37) 


 





 


Alanine Aminotransferase


(ALT/SGPT) 


 


 12 U/L (14-59) 


 





 


Alkaline Phosphatase


 


 


 52 U/L


() 





 


Total Protein


 


 


 4.9 g/dL


(6.4-8.2) 





 


Albumin


 


 


 2.6 g/dL


(3.4-5.0) 





 


Albumin/Globulin Ratio   1.1 (1.0-1.7)  


 


Test


 4/17/20


07:00 4/17/20


08:00 


 





 


White Blood Count


 5.8 x10^3/uL


(4.0-11.0) 


 


 





 


Red Blood Count


 2.40 x10^6/uL


(3.50-5.40) 


 


 





 


Hemoglobin


 7.6 g/dL


(12.0-15.5) 


 


 





 


Hematocrit


 22.8 %


(36.0-47.0) 


 


 





 


Mean Corpuscular Volume 95 fL ()    


 


Mean Corpuscular Hemoglobin 32 pg (25-35)    


 


Mean Corpuscular Hemoglobin


Concent 33 g/dL


(31-37) 


 


 





 


Red Cell Distribution Width


 18.4 %


(11.5-14.5) 


 


 





 


Platelet Count


 498 x10^3/uL


(140-400) 


 


 





 


Neutrophils (%) (Auto) 67 % (31-73)    


 


Lymphocytes (%) (Auto) 17 % (24-48)    


 


Monocytes (%) (Auto) 14 % (0-9)    


 


Eosinophils (%) (Auto) 1 % (0-3)    


 


Basophils (%) (Auto) 1 % (0-3)    


 


Neutrophils # (Auto)


 3.9 x10^3/uL


(1.8-7.7) 


 


 





 


Lymphocytes # (Auto)


 1.0 x10^3/uL


(1.0-4.8) 


 


 





 


Monocytes # (Auto)


 0.8 x10^3/uL


(0.0-1.1) 


 


 





 


Eosinophils # (Auto)


 0.0 x10^3/uL


(0.0-0.7) 


 


 





 


Basophils # (Auto)


 0.0 x10^3/uL


(0.0-0.2) 


 


 





 


O2 Saturation  98 % (92-99)   


 


Arterial Blood pH


 


 7.47


(7.35-7.45) 


 





 


Arterial Blood pCO2 at


Patient Temp 


 33 mmHg


(35-46) 


 





 


Arterial Blood pO2 at Patient


Temp 


 136 mmHg


() 


 





 


Arterial Blood HCO3


 


 24 mmol/L


(21-28) 


 





 


Arterial Blood Base Excess


 


 0 mmol/L


(-3-3) 


 





 


FiO2  35   











Micro


Micro





Microbiology


4/14/20 Blood Culture - Preliminary, Resulted


          NO GROWTH AFTER 2 DAYS


4/12/20 Urine Culture - Final, Complete


          


4/12/20 Urine Culture Result 1 ([facility]) - Final, Complete





Review of Systems


Constitutional:  yes: other (ON THE VENT)





Physical Exam


General Appearance:  other (ON THE VENT)


Skin:  warm


Respiratory:  decreased breath sounds


Heart:  S1S2


Abdomen:  soft, bowel sounds present


Genitourinary:  bladder flat


Extremities:  pulses present, edema


Neurology:  other (SEDATED)





Assessment


Assessment


IMP





[id number]


ANEMIA


LEUCOCYTOSIS-IMPROVING


ANASARCA DUE TO 3RD SPACING


HYPERKALEMIA-RESOLVED


ACIDOSIS AND ACIDEMIA-CONTROLLED


ACUTE REPS FAILURE


ACUTE PANCREATITIS


HYPOALBUMINEMIA


HYPOCALCEMIA-FROM SAPONIFICATION-BETTER


POOR HD CATHETER FUNCTION





PLAN








HD TOMORROW


SPA PRIOR TO TX


TPN TO CONTINUE AS NEEDED


PRBC AS NEEDED


CONT YOLI


VENT SUPPORT


ANTIBIOTICS


WILL NEED LTAC


WILL FOLLOW











BETH HEIN MD                 Apr 17, 2020 11:31

## 2020-04-17 NOTE — NUR
SS following up with discharge planning. Pt remains on vent with trach, TPN, and HD. Pt 
needing LTAC but self pay pt. SS discussed with Lindsay in [facility]. She reported that pt has no 
DPOA and they have been provided with no financial information for pt. She reported that 
pt's daughters are still awaiting court order to obtain financial information for pt. SS 
will continue to follow for discharge planning.

## 2020-04-18 VITALS — DIASTOLIC BLOOD PRESSURE: 56 MMHG | SYSTOLIC BLOOD PRESSURE: 106 MMHG

## 2020-04-18 VITALS — DIASTOLIC BLOOD PRESSURE: 81 MMHG | SYSTOLIC BLOOD PRESSURE: 144 MMHG

## 2020-04-18 VITALS — DIASTOLIC BLOOD PRESSURE: 62 MMHG | SYSTOLIC BLOOD PRESSURE: 138 MMHG

## 2020-04-18 VITALS — DIASTOLIC BLOOD PRESSURE: 66 MMHG | SYSTOLIC BLOOD PRESSURE: 146 MMHG

## 2020-04-18 VITALS — SYSTOLIC BLOOD PRESSURE: 153 MMHG | DIASTOLIC BLOOD PRESSURE: 91 MMHG

## 2020-04-18 VITALS — SYSTOLIC BLOOD PRESSURE: 98 MMHG | DIASTOLIC BLOOD PRESSURE: 56 MMHG

## 2020-04-18 VITALS — SYSTOLIC BLOOD PRESSURE: 133 MMHG | DIASTOLIC BLOOD PRESSURE: 69 MMHG

## 2020-04-18 VITALS — DIASTOLIC BLOOD PRESSURE: 74 MMHG | SYSTOLIC BLOOD PRESSURE: 139 MMHG

## 2020-04-18 VITALS — DIASTOLIC BLOOD PRESSURE: 78 MMHG | SYSTOLIC BLOOD PRESSURE: 117 MMHG

## 2020-04-18 VITALS — SYSTOLIC BLOOD PRESSURE: 122 MMHG | DIASTOLIC BLOOD PRESSURE: 68 MMHG

## 2020-04-18 VITALS — SYSTOLIC BLOOD PRESSURE: 110 MMHG | DIASTOLIC BLOOD PRESSURE: 53 MMHG

## 2020-04-18 VITALS — DIASTOLIC BLOOD PRESSURE: 96 MMHG | SYSTOLIC BLOOD PRESSURE: 170 MMHG

## 2020-04-18 VITALS — SYSTOLIC BLOOD PRESSURE: 167 MMHG | DIASTOLIC BLOOD PRESSURE: 80 MMHG

## 2020-04-18 VITALS — DIASTOLIC BLOOD PRESSURE: 75 MMHG | SYSTOLIC BLOOD PRESSURE: 134 MMHG

## 2020-04-18 VITALS — SYSTOLIC BLOOD PRESSURE: 110 MMHG | DIASTOLIC BLOOD PRESSURE: 63 MMHG

## 2020-04-18 VITALS — DIASTOLIC BLOOD PRESSURE: 69 MMHG | SYSTOLIC BLOOD PRESSURE: 133 MMHG

## 2020-04-18 VITALS — DIASTOLIC BLOOD PRESSURE: 66 MMHG | SYSTOLIC BLOOD PRESSURE: 136 MMHG

## 2020-04-18 VITALS — SYSTOLIC BLOOD PRESSURE: 137 MMHG | DIASTOLIC BLOOD PRESSURE: 82 MMHG

## 2020-04-18 VITALS — DIASTOLIC BLOOD PRESSURE: 81 MMHG | SYSTOLIC BLOOD PRESSURE: 160 MMHG

## 2020-04-18 VITALS — SYSTOLIC BLOOD PRESSURE: 143 MMHG | DIASTOLIC BLOOD PRESSURE: 76 MMHG

## 2020-04-18 VITALS — SYSTOLIC BLOOD PRESSURE: 185 MMHG | DIASTOLIC BLOOD PRESSURE: 98 MMHG

## 2020-04-18 VITALS — DIASTOLIC BLOOD PRESSURE: 80 MMHG | SYSTOLIC BLOOD PRESSURE: 171 MMHG

## 2020-04-18 VITALS — DIASTOLIC BLOOD PRESSURE: 71 MMHG | SYSTOLIC BLOOD PRESSURE: 136 MMHG

## 2020-04-18 VITALS — DIASTOLIC BLOOD PRESSURE: 63 MMHG | SYSTOLIC BLOOD PRESSURE: 130 MMHG

## 2020-04-18 LAB
ALBUMIN SERPL-MCNC: 2.6 G/DL (ref 3.4–5)
ALBUMIN/GLOB SERPL: 1 {RATIO} (ref 1–1.7)
ALP SERPL-CCNC: 49 U/L (ref 46–116)
ALT SERPL-CCNC: 11 U/L (ref 14–59)
ANION GAP SERPL CALC-SCNC: 13 MMOL/L (ref 6–14)
AST SERPL-CCNC: 23 U/L (ref 15–37)
BASOPHILS # BLD AUTO: 0 X10^3/UL (ref 0–0.2)
BASOPHILS NFR BLD: 0 % (ref 0–3)
BILIRUB SERPL-MCNC: 0.4 MG/DL (ref 0.2–1)
BUN SERPL-MCNC: 92 MG/DL (ref 7–20)
BUN/CREAT SERPL: 48 (ref 6–20)
CALCIUM SERPL-MCNC: 8.6 MG/DL (ref 8.5–10.1)
CHLORIDE SERPL-SCNC: 103 MMOL/L (ref 98–107)
CO2 SERPL-SCNC: 25 MMOL/L (ref 21–32)
CREAT SERPL-MCNC: 1.9 MG/DL (ref 0.6–1)
EOSINOPHIL NFR BLD: 0 % (ref 0–3)
EOSINOPHIL NFR BLD: 0 X10^3/UL (ref 0–0.7)
ERYTHROCYTE [DISTWIDTH] IN BLOOD BY AUTOMATED COUNT: 18.1 % (ref 11.5–14.5)
GFR SERPLBLD BASED ON 1.73 SQ M-ARVRAT: 28.1 ML/MIN
GLOBULIN SER-MCNC: 2.5 G/DL (ref 2.2–3.8)
GLUCOSE SERPL-MCNC: 152 MG/DL (ref 70–99)
HCT VFR BLD CALC: 21.1 % (ref 36–47)
HGB BLD-MCNC: 7.1 G/DL (ref 12–15.5)
LYMPHOCYTES # BLD: 0.7 X10^3/UL (ref 1–4.8)
LYMPHOCYTES NFR BLD AUTO: 12 % (ref 24–48)
MAGNESIUM SERPL-MCNC: 1.7 MG/DL (ref 1.8–2.4)
MCH RBC QN AUTO: 32 PG (ref 25–35)
MCHC RBC AUTO-ENTMCNC: 33 G/DL (ref 31–37)
MCV RBC AUTO: 95 FL (ref 79–100)
MONO #: 0.9 X10^3/UL (ref 0–1.1)
MONOCYTES NFR BLD: 14 % (ref 0–9)
NEUT #: 4.7 X10^3/UL (ref 1.8–7.7)
NEUTROPHILS NFR BLD AUTO: 74 % (ref 31–73)
PHOSPHATE SERPL-MCNC: 5.2 MG/DL (ref 2.6–4.7)
PLATELET # BLD AUTO: 539 X10^3/UL (ref 140–400)
POTASSIUM SERPL-SCNC: 3.3 MMOL/L (ref 3.5–5.1)
PROT SERPL-MCNC: 5.1 G/DL (ref 6.4–8.2)
RBC # BLD AUTO: 2.23 X10^6/UL (ref 3.5–5.4)
SODIUM SERPL-SCNC: 141 MMOL/L (ref 136–145)
WBC # BLD AUTO: 6.3 X10^3/UL (ref 4–11)

## 2020-04-18 RX ADMIN — DEXMEDETOMIDINE HYDROCHLORIDE PRN MLS/HR: 100 INJECTION, SOLUTION, CONCENTRATE INTRAVENOUS at 03:43

## 2020-04-18 RX ADMIN — DEXMEDETOMIDINE HYDROCHLORIDE PRN MLS/HR: 100 INJECTION, SOLUTION, CONCENTRATE INTRAVENOUS at 11:16

## 2020-04-18 RX ADMIN — MEROPENEM SCH MLS/HR: 500 INJECTION, POWDER, FOR SOLUTION INTRAVENOUS at 21:58

## 2020-04-18 RX ADMIN — INSULIN LISPRO SCH UNITS: 100 INJECTION, SOLUTION INTRAVENOUS; SUBCUTANEOUS at 00:00

## 2020-04-18 RX ADMIN — DEXTROSE SCH MLS/HR: 50 INJECTION, SOLUTION INTRAVENOUS at 14:39

## 2020-04-18 RX ADMIN — DEXMEDETOMIDINE HYDROCHLORIDE PRN MLS/HR: 100 INJECTION, SOLUTION, CONCENTRATE INTRAVENOUS at 21:15

## 2020-04-18 RX ADMIN — INSULIN LISPRO SCH UNITS: 100 INJECTION, SOLUTION INTRAVENOUS; SUBCUTANEOUS at 18:00

## 2020-04-18 RX ADMIN — HEPARIN SODIUM SCH UNIT: 5000 INJECTION, SOLUTION INTRAVENOUS; SUBCUTANEOUS at 14:09

## 2020-04-18 RX ADMIN — MEROPENEM SCH MLS/HR: 500 INJECTION, POWDER, FOR SOLUTION INTRAVENOUS at 14:23

## 2020-04-18 RX ADMIN — PROCHLORPERAZINE EDISYLATE PRN MG: 5 INJECTION INTRAMUSCULAR; INTRAVENOUS at 14:08

## 2020-04-18 RX ADMIN — DEXMEDETOMIDINE HYDROCHLORIDE PRN MLS/HR: 100 INJECTION, SOLUTION, CONCENTRATE INTRAVENOUS at 22:57

## 2020-04-18 RX ADMIN — Medication PRN EACH: at 13:05

## 2020-04-18 RX ADMIN — INSULIN LISPRO SCH UNITS: 100 INJECTION, SOLUTION INTRAVENOUS; SUBCUTANEOUS at 12:00

## 2020-04-18 RX ADMIN — DEXMEDETOMIDINE HYDROCHLORIDE PRN MLS/HR: 100 INJECTION, SOLUTION, CONCENTRATE INTRAVENOUS at 16:59

## 2020-04-18 RX ADMIN — INSULIN LISPRO SCH UNITS: 100 INJECTION, SOLUTION INTRAVENOUS; SUBCUTANEOUS at 06:00

## 2020-04-18 RX ADMIN — PANTOPRAZOLE SODIUM SCH MG: 40 INJECTION, POWDER, FOR SOLUTION INTRAVENOUS at 14:08

## 2020-04-18 RX ADMIN — DEXMEDETOMIDINE HYDROCHLORIDE PRN MLS/HR: 100 INJECTION, SOLUTION, CONCENTRATE INTRAVENOUS at 14:18

## 2020-04-18 RX ADMIN — HEPARIN SODIUM SCH UNIT: 5000 INJECTION, SOLUTION INTRAVENOUS; SUBCUTANEOUS at 21:23

## 2020-04-18 RX ADMIN — DEXMEDETOMIDINE HYDROCHLORIDE PRN MLS/HR: 100 INJECTION, SOLUTION, CONCENTRATE INTRAVENOUS at 00:10

## 2020-04-18 NOTE — NUR
Pharmacy TPN Dosing Note



S: JESENIA RICH is a 49 year old F Currently receiving Central Continuous TPN started 
03/18/20



B:Pertinent PMH: Necrotizing pancreatitis

Height: 5 feet, 8 inches

Weight: 103.9 kg



Current diet: NPO 



LABS:

Sodium:    141 

Potassium: 3.3 

Chloride:  103 

Calcium:   8.6 

Corrected Calcium: 9.72 

Magnesium: 1.7 

CO2:       25 

SCr:       1.9 

Glucose:   129-181 

Albumin:   2.6 

AST:       23 

ALT:       11 



TPN FORMULA:

TPN TYPE:  Central Continuous

AMINO ACIDS:         125 gm

DEXTROSE:            225 gm

LIPIDS:              20 gm



SODIUM CHLORIDE:     100 mEq

POTASSIUM CHLORIDE:  40 mEq

MAGNESIUM:           15 mEq

CALCIUM:             15 mEq

INSULIN:             35 units

MULTIPLE VITAMIN:    10 ml

TRACE ELEMENTS:      0.5 ml(s)



TPN PLAN:  Kphos removed from TPN. KCl and magnesium increased





R: Change TPN per plan and ordered formula

Will monitor electrolytes, glucose, and tolerance to TPN.



 Maribell Vazquez [facility], 04/18/20 [id number]

## 2020-04-18 NOTE — PDOC
Infectious Disease Note


Subjective


Subjective


Mild nausea and some discomfort


Remains on vent via trach, FiO2 35 % PEEP 5 SpO2 100%


TPN 


No fevers last 24 hours





Vital Sign


Vital Signs





Vital Signs








  Date Time  Temp Pulse Resp B/P (MAP) Pulse Ox O2 Delivery O2 Flow Rate FiO2


 


4/18/20 13:51     98 Trilogy  


 


4/18/20 12:00 99.4 97 20 117/78 (91)    





 99.4       











Physical Exam


PHYSICAL EXAM


GENERAL: Propped up in bed, resting quietly, arouses to voice


HEENT: Pupils equal, + NGT, oral cavity dry 


NECK:  Trach/vent 


LUNGS: Diminished aeration 


HEART:  S1, S2, regular 


ABDOMEN:  Less distended, tender, hypoactive BS, 


: [place]  changed 4/14


EXTREMITIES: Generalized edema, no cyanosis, SCDs bilaterally 


DERMATOLOGIC:  Warm and dry.  No generalized rash.  


CENTRAL NERVOUS SYSTEM: Nods appropriately to few questions, 


HDC & LIJ (4/14) clean





Labs


Lab





Laboratory Tests








Test


 4/17/20


17:06 4/18/20


06:10 4/18/20


07:17


 


Glucose (Fingerstick)


 146 mg/dL


(70-99) 


 137 mg/dL


(70-99)


 


White Blood Count


 


 6.3 x10^3/uL


(4.0-11.0) 





 


Red Blood Count


 


 2.23 x10^6/uL


(3.50-5.40) 





 


Hemoglobin


 


 7.1 g/dL


(12.0-15.5) 





 


Hematocrit


 


 21.1 %


(36.0-47.0) 





 


Mean Corpuscular Volume  95 fL ()  


 


Mean Corpuscular Hemoglobin  32 pg (25-35)  


 


Mean Corpuscular Hemoglobin


Concent 


 33 g/dL


(31-37) 





 


Red Cell Distribution Width


 


 18.1 %


(11.5-14.5) 





 


Platelet Count


 


 539 x10^3/uL


(140-400) 





 


Neutrophils (%) (Auto)  74 % (31-73)  


 


Lymphocytes (%) (Auto)  12 % (24-48)  


 


Monocytes (%) (Auto)  14 % (0-9)  


 


Eosinophils (%) (Auto)  0 % (0-3)  


 


Basophils (%) (Auto)  0 % (0-3)  


 


Neutrophils # (Auto)


 


 4.7 x10^3/uL


(1.8-7.7) 





 


Lymphocytes # (Auto)


 


 0.7 x10^3/uL


(1.0-4.8) 





 


Monocytes # (Auto)


 


 0.9 x10^3/uL


(0.0-1.1) 





 


Eosinophils # (Auto)


 


 0.0 x10^3/uL


(0.0-0.7) 





 


Basophils # (Auto)


 


 0.0 x10^3/uL


(0.0-0.2) 





 


Sodium Level


 


 141 mmol/L


(136-145) 





 


Potassium Level


 


 3.3 mmol/L


(3.5-5.1) 





 


Chloride Level


 


 103 mmol/L


() 





 


Carbon Dioxide Level


 


 25 mmol/L


(21-32) 





 


Anion Gap  13 (6-14)  


 


Blood Urea Nitrogen


 


 92 mg/dL


(7-20) 





 


Creatinine


 


 1.9 mg/dL


(0.6-1.0) 





 


Estimated GFR


(Cockcroft-Gault) 


 28.1 


 





 


BUN/Creatinine Ratio  48 (6-20)  


 


Glucose Level


 


 152 mg/dL


(70-99) 





 


Calcium Level


 


 8.6 mg/dL


(8.5-10.1) 





 


Phosphorus Level


 


 5.2 mg/dL


(2.6-4.7) 





 


Magnesium Level


 


 1.7 mg/dL


(1.8-2.4) 





 


Total Bilirubin


 


 0.4 mg/dL


(0.2-1.0) 





 


Aspartate Amino Transf


(AST/SGOT) 


 23 U/L (15-37) 


 





 


Alanine Aminotransferase


(ALT/SGPT) 


 11 U/L (14-59) 


 





 


Alkaline Phosphatase


 


 49 U/L


() 





 


Total Protein


 


 5.1 g/dL


(6.4-8.2) 





 


Albumin


 


 2.6 g/dL


(3.4-5.0) 





 


Albumin/Globulin Ratio  1.0 (1.0-1.7)  











Objective


Assessment


Leukocytosis 4/10 -improved 


Fever - - better - ? pancreatitis. blood cults 4/10 neg.Urine - neg, New L 

IJ/[place] and PICC removed 4/14


S/p Paracentesis 4/15 - 4300 ml


Severe acute gallstone pancreatitis with necrosis


   -CT 4/14 necrotizing pancreatitis with fluid and phlegmon at the pancreas


    Ascites


  


Hypotension off levaphed 


Julian Pleural effusions


JED requiring HD 


   - s/p RIJ temporary dialysis catheter replacement, 4/2


Vent dependent respiratory failure


   -s/p Trach placement 


   -lung opacities, COVID-19 neg 


Anasarca - worse


Anemia - S/p PRBC 3/26


Hypocalcemia 


Prediabetes


HTN


Diarrhea, C. diff neg 3/23


Anemia - S/p PRBCs





Plan


Plan of Care


didnt tolerate tube clamping


Blood cults 4/14 neg to date 


cont merrem (4/8) and Zyvox (4/10),micafungin, Flagyl 4/14


-previously on cefepime, flagyl & dapto 


Gen surgery following


Maintain aspiration precautions 


Anemia deferred to primary





Critically ill





D/w nursing





post HD and mild Nausea. trouble with tube clamping





D/w nursing





Attending Co-Sign


Attending Co-Sign


The patient was seen and interviewed as well as examined at the bedside. The 

chart was reviewed. The case was discussed. Agree with the plan of care.











HAVEN WINTERS APRN        Apr 18, 2020 14:22


RASHAWN ROSEN MD              Apr 18, 2020 15:10

## 2020-04-18 NOTE — PDOC
PROGRESS NOTES


Chief Complaint


Chief Complaint


A/P:


Respiratory failure requiring mechanical ventilation (on vent since 3/23)


bilateral pleural effusions/pulm edema 


Sepsis


Severe Acute gallstone pancreatitis (not a surgical candidate at this time) with

necrosis


Acute kidney failure now requiring dialysis


Salpingitis


Gallstones (Calculus of gallbladder with acute cholecystitis without 

obstruction)


HTN 


Leukocytosis 


Hypoxia


Uterine fibroid


Intractable pain


Intractable nausea


Covid 19 negative. 


Acute on chronic anemia 


EEG: No seizure activity.


ESRD on HD


Hyperglycemia, persistently in 200s





History of Present Illness


History of Present Illness


Ms Tadeo is a 50yo F w/ PMHx HTN, prediabetes who presented to the emergency 

room with complaints of abdominal pain on 3/16/2020. Found with Lipase 82065, 

, , Bilirubin 1.4.


CT abdomen confirms pancreatic inflammation, peripancreatic fluid and 

inflammatory changes around the pancreas consistent with pancreatitis. 

Cholelithiasis and 1.4cm uterine fibroid as well as possible left salpingitis. 

Admitted for further care


GI, General surgery, ID, Pulm consulted.





3/17: No urine output. Added dilaudid for pain, PICC placed per IR. Renal US 

negative.Seen bedside in ICU, given 2L additional NSS and albumin infusion. 

Still hypotensive, started on levophed. Repeat CT abdomen with necrosis. 


3/18: O2 saturation 87% on nasal cannula oxygen. Dialysis catheter per 

nephrology


3/19: She is now on BiPAP appears more ill, now on dialysis


3/20: Seen on BiPAP. Her mother and another family member are present and seemed

to be good support for her. Currently on dialysis. Appears critically ill


3/21: Overnight Tmax 101.7 , still on BiPAP FiO2 40%, still on low dose Levophed

gtt, TPN initiated. On dialysis


4/6: Tracheostomy


4/12: S/p tracheostomy on vent spontaneous respirations with 5 of pressure 

support 35% FiO2, rectal tube and a [place], off pressors


4/13: Off pressors. Seen on dialysis this morning. BUN 80. Tracking with her 

eyes. Still on vent via trach.


4/14: BUN 68, Cr 1.7. Temp 100.2F axillary. WBC 9.8. Still on vent via trach. 

Tracking reasonably well. In obvious discomfort. Removed PICC and CVC [facility] and 

replaced. Tips sent for culture. CT chest/abd/pelvis with bilateral pleural 

effusion and ascites.


4/15: Renal function stable. Still on vent. More interactive today. Miming wish 

for food. Plan discussed for thoracentesis/paracentesis with daughters today. 

They were under impression patient was doing worse due to a miscommunication 

which has been clarified over the phone. 4.3L removed.


4/16: BUN 88, Cr 1.8. Much more interactive today. Appears more comfortable 

today.


4/17: Febrile overnight 101.8F. More interactive, still on vent. Asking for ice 

by miming.





Afebrile overnight. TMax last 24 hours 100.6F. Hb 7.1. Interactive when awake.





Plan:


Cont vent weaning, dialysis


Trach shield during day if ok with pulm. Would recommend ABG after 4 hours to 

r/o CO2 retention, however and still vent overnight





Vitals


Vitals





Vital Signs








  Date Time  Temp Pulse Resp B/P (MAP) Pulse Ox O2 Delivery O2 Flow Rate FiO2


 


4/18/20 06:00  90 18 136/71 (92) 100 Ventilator  


 


4/18/20 04:00 99.5       





 99.5       











Physical Exam


Physical Exam


GENERAL: Alert and coop. mouthing words  Appears comfortable has generalized 

anasarca - s/p suctioning with RT. Feels warm


HEENT: Pupils equal, + NGT, oral cavity dry 


NECK:  Trach/vent 


LUNGS: Diminished aeration 


HEART:  S1, S2, regular 


ABDOMEN:  Less Distended, hypoactive BS, 


: [place]  changed 4/14


EXTREMITIES: Generalized edema, no cyanosis, SCDs bilaterally 


DERMATOLOGIC:  Warm and dry.  No generalized rash.  


CENTRAL NERVOUS SYSTEM: Responsive and seems appropriate


HDC, LIJ (4/14) and


RUE-PICC removed 4/14


General:  Alert, No acute distress


Heart:  Other (increased rate)


Lungs:  Other (dimished in BLL)


Abdomen:  Soft, No tenderness


Extremities:  No edema, Other (SOME CLUBBING )


Skin:  Other (mottling noted to extremities )





Labs


LABS





Laboratory Tests








Test


 4/17/20


08:00 4/17/20


13:08 4/17/20


17:06 4/18/20


06:10


 


O2 Saturation 98 % (92-99)    


 


Arterial Blood pH


 7.47


(7.35-7.45) 


 


 





 


Arterial Blood pCO2 at


Patient Temp 33 mmHg


(35-46) 


 


 





 


Arterial Blood pO2 at Patient


Temp 136 mmHg


() 


 


 





 


Arterial Blood HCO3


 24 mmol/L


(21-28) 


 


 





 


Arterial Blood Base Excess


 0 mmol/L


(-3-3) 


 


 





 


FiO2 35    


 


Glucose (Fingerstick)


 


 181 mg/dL


(70-99) 146 mg/dL


(70-99) 





 


White Blood Count


 


 


 


 6.3 x10^3/uL


(4.0-11.0)


 


Red Blood Count


 


 


 


 2.23 x10^6/uL


(3.50-5.40)


 


Hemoglobin


 


 


 


 7.1 g/dL


(12.0-15.5)


 


Hematocrit


 


 


 


 21.1 %


(36.0-47.0)


 


Mean Corpuscular Volume    95 fL () 


 


Mean Corpuscular Hemoglobin    32 pg (25-35) 


 


Mean Corpuscular Hemoglobin


Concent 


 


 


 33 g/dL


(31-37)


 


Red Cell Distribution Width


 


 


 


 18.1 %


(11.5-14.5)


 


Platelet Count


 


 


 


 539 x10^3/uL


(140-400)


 


Neutrophils (%) (Auto)    74 % (31-73) 


 


Lymphocytes (%) (Auto)    12 % (24-48) 


 


Monocytes (%) (Auto)    14 % (0-9) 


 


Eosinophils (%) (Auto)    0 % (0-3) 


 


Basophils (%) (Auto)    0 % (0-3) 


 


Neutrophils # (Auto)


 


 


 


 4.7 x10^3/uL


(1.8-7.7)


 


Lymphocytes # (Auto)


 


 


 


 0.7 x10^3/uL


(1.0-4.8)


 


Monocytes # (Auto)


 


 


 


 0.9 x10^3/uL


(0.0-1.1)


 


Eosinophils # (Auto)


 


 


 


 0.0 x10^3/uL


(0.0-0.7)


 


Basophils # (Auto)


 


 


 


 0.0 x10^3/uL


(0.0-0.2)


 


Sodium Level


 


 


 


 141 mmol/L


(136-145)


 


Potassium Level


 


 


 


 3.3 mmol/L


(3.5-5.1)


 


Chloride Level


 


 


 


 103 mmol/L


()


 


Carbon Dioxide Level


 


 


 


 25 mmol/L


(21-32)


 


Anion Gap    13 (6-14) 


 


Blood Urea Nitrogen


 


 


 


 92 mg/dL


(7-20)


 


Creatinine


 


 


 


 1.9 mg/dL


(0.6-1.0)


 


Estimated GFR


(Cockcroft-Gault) 


 


 


 28.1 





 


BUN/Creatinine Ratio    48 (6-20) 


 


Glucose Level


 


 


 


 152 mg/dL


(70-99)


 


Calcium Level


 


 


 


 8.6 mg/dL


(8.5-10.1)


 


Phosphorus Level


 


 


 


 5.2 mg/dL


(2.6-4.7)


 


Magnesium Level


 


 


 


 1.7 mg/dL


(1.8-2.4)


 


Total Bilirubin


 


 


 


 0.4 mg/dL


(0.2-1.0)


 


Aspartate Amino Transf


(AST/SGOT) 


 


 


 23 U/L (15-37) 





 


Alanine Aminotransferase


(ALT/SGPT) 


 


 


 11 U/L (14-59) 





 


Alkaline Phosphatase


 


 


 


 49 U/L


()


 


Total Protein


 


 


 


 5.1 g/dL


(6.4-8.2)


 


Albumin


 


 


 


 2.6 g/dL


(3.4-5.0)


 


Albumin/Globulin Ratio    1.0 (1.0-1.7) 


 


Test


 4/18/20


07:17 


 


 





 


Glucose (Fingerstick)


 137 mg/dL


(70-99) 


 


 














Assessment and Plan


Assessmemt and Plan


Problems


Medical Problems:


(1) Acute pancreatitis


Status: Acute  





(2) Cholelithiasis


Status: Acute  











Comment


Review of Relevant


I have reviewed the following items josy (where applicable) has been applied.


Labs





Laboratory Tests








Test


 4/16/20


08:00 4/16/20


10:00 4/16/20


19:31 4/16/20


23:53


 


O2 Saturation 97 % (92-99)    


 


Arterial Blood pH


 7.45


(7.35-7.45) 


 


 





 


Arterial Blood pH (Temp


corrected) 7.43 


 


 


 





 


Arterial Blood pCO2 at


Patient Temp 33 mmHg


(35-46) 


 


 





 


Arterial Blood pCO2 (Temp


correct) 35 mmHg 


 


 


 





 


Arterial Blood pO2 at Patient


Temp 103 mmHg


() 


 


 





 


Arterial Blood pO2 (Temp


corrected) 114 mmHg 


 


 


 





 


Arterial Blood HCO3


 22 mmol/L


(21-28) 


 


 





 


Arterial Blood Base Excess


 -1 mmol/L


(-3-3) 


 


 





 


FiO2 35% vent    


 


White Blood Count


 


 9.2 x10^3/uL


(4.0-11.0) 


 





 


Red Blood Count


 


 2.76 x10^6/uL


(3.50-5.40) 


 





 


Hemoglobin


 


 8.9 g/dL


(12.0-15.5) 


 





 


Hematocrit


 


 26.5 %


(36.0-47.0) 


 





 


Mean Corpuscular Volume  96 fL ()   


 


Mean Corpuscular Hemoglobin  32 pg (25-35)   


 


Mean Corpuscular Hemoglobin


Concent 


 33 g/dL


(31-37) 


 





 


Red Cell Distribution Width


 


 18.9 %


(11.5-14.5) 


 





 


Platelet Count


 


 530 x10^3/uL


(140-400) 


 





 


Neutrophils (%) (Auto)  66 % (31-73)   


 


Lymphocytes (%) (Auto)  21 % (24-48)   


 


Monocytes (%) (Auto)  12 % (0-9)   


 


Eosinophils (%) (Auto)  0 % (0-3)   


 


Basophils (%) (Auto)  1 % (0-3)   


 


Neutrophils # (Auto)


 


 6.1 x10^3/uL


(1.8-7.7) 


 





 


Lymphocytes # (Auto)


 


 2.0 x10^3/uL


(1.0-4.8) 


 





 


Monocytes # (Auto)


 


 1.1 x10^3/uL


(0.0-1.1) 


 





 


Eosinophils # (Auto)


 


 0.0 x10^3/uL


(0.0-0.7) 


 





 


Basophils # (Auto)


 


 0.0 x10^3/uL


(0.0-0.2) 


 





 


Glucose (Fingerstick)


 


 


 142 mg/dL


(70-99) 145 mg/dL


(70-99)


 


Test


 4/17/20


06:00 4/17/20


06:06 4/17/20


07:00 4/17/20


08:00


 


Sodium Level


 140 mmol/L


(136-145) 


 


 





 


Potassium Level


 3.7 mmol/L


(3.5-5.1) 


 


 





 


Chloride Level


 103 mmol/L


() 


 


 





 


Carbon Dioxide Level


 26 mmol/L


(21-32) 


 


 





 


Anion Gap 11 (6-14)    


 


Blood Urea Nitrogen


 80 mg/dL


(7-20) 


 


 





 


Creatinine


 1.6 mg/dL


(0.6-1.0) 


 


 





 


Estimated GFR


(Cockcroft-Gault) 34.3 


 


 


 





 


BUN/Creatinine Ratio 50 (6-20)    


 


Glucose Level


 130 mg/dL


(70-99) 


 


 





 


Calcium Level


 8.4 mg/dL


(8.5-10.1) 


 


 





 


Phosphorus Level


 5.2 mg/dL


(2.6-4.7) 


 


 





 


Total Bilirubin


 0.4 mg/dL


(0.2-1.0) 


 


 





 


Aspartate Amino Transf


(AST/SGOT) 23 U/L (15-37) 


 


 


 





 


Alanine Aminotransferase


(ALT/SGPT) 12 U/L (14-59) 


 


 


 





 


Alkaline Phosphatase


 52 U/L


() 


 


 





 


Total Protein


 4.9 g/dL


(6.4-8.2) 


 


 





 


Albumin


 2.6 g/dL


(3.4-5.0) 


 


 





 


Albumin/Globulin Ratio 1.1 (1.0-1.7)    


 


Glucose (Fingerstick)


 


 129 mg/dL


(70-99) 


 





 


White Blood Count


 


 


 5.8 x10^3/uL


(4.0-11.0) 





 


Red Blood Count


 


 


 2.40 x10^6/uL


(3.50-5.40) 





 


Hemoglobin


 


 


 7.6 g/dL


(12.0-15.5) 





 


Hematocrit


 


 


 22.8 %


(36.0-47.0) 





 


Mean Corpuscular Volume   95 fL ()  


 


Mean Corpuscular Hemoglobin   32 pg (25-35)  


 


Mean Corpuscular Hemoglobin


Concent 


 


 33 g/dL


(31-37) 





 


Red Cell Distribution Width


 


 


 18.4 %


(11.5-14.5) 





 


Platelet Count


 


 


 498 x10^3/uL


(140-400) 





 


Neutrophils (%) (Auto)   67 % (31-73)  


 


Lymphocytes (%) (Auto)   17 % (24-48)  


 


Monocytes (%) (Auto)   14 % (0-9)  


 


Eosinophils (%) (Auto)   1 % (0-3)  


 


Basophils (%) (Auto)   1 % (0-3)  


 


Neutrophils # (Auto)


 


 


 3.9 x10^3/uL


(1.8-7.7) 





 


Lymphocytes # (Auto)


 


 


 1.0 x10^3/uL


(1.0-4.8) 





 


Monocytes # (Auto)


 


 


 0.8 x10^3/uL


(0.0-1.1) 





 


Eosinophils # (Auto)


 


 


 0.0 x10^3/uL


(0.0-0.7) 





 


Basophils # (Auto)


 


 


 0.0 x10^3/uL


(0.0-0.2) 





 


O2 Saturation    98 % (92-99) 


 


Arterial Blood pH


 


 


 


 7.47


(7.35-7.45)


 


Arterial Blood pCO2 at


Patient Temp 


 


 


 33 mmHg


(35-46)


 


Arterial Blood pO2 at Patient


Temp 


 


 


 136 mmHg


()


 


Arterial Blood HCO3


 


 


 


 24 mmol/L


(21-28)


 


Arterial Blood Base Excess


 


 


 


 0 mmol/L


(-3-3)


 


FiO2    35 


 


Test


 4/17/20


13:08 4/17/20


17:06 4/18/20


06:10 4/18/20


07:17


 


Glucose (Fingerstick)


 181 mg/dL


(70-99) 146 mg/dL


(70-99) 


 137 mg/dL


(70-99)


 


White Blood Count


 


 


 6.3 x10^3/uL


(4.0-11.0) 





 


Red Blood Count


 


 


 2.23 x10^6/uL


(3.50-5.40) 





 


Hemoglobin


 


 


 7.1 g/dL


(12.0-15.5) 





 


Hematocrit


 


 


 21.1 %


(36.0-47.0) 





 


Mean Corpuscular Volume   95 fL ()  


 


Mean Corpuscular Hemoglobin   32 pg (25-35)  


 


Mean Corpuscular Hemoglobin


Concent 


 


 33 g/dL


(31-37) 





 


Red Cell Distribution Width


 


 


 18.1 %


(11.5-14.5) 





 


Platelet Count


 


 


 539 x10^3/uL


(140-400) 





 


Neutrophils (%) (Auto)   74 % (31-73)  


 


Lymphocytes (%) (Auto)   12 % (24-48)  


 


Monocytes (%) (Auto)   14 % (0-9)  


 


Eosinophils (%) (Auto)   0 % (0-3)  


 


Basophils (%) (Auto)   0 % (0-3)  


 


Neutrophils # (Auto)


 


 


 4.7 x10^3/uL


(1.8-7.7) 





 


Lymphocytes # (Auto)


 


 


 0.7 x10^3/uL


(1.0-4.8) 





 


Monocytes # (Auto)


 


 


 0.9 x10^3/uL


(0.0-1.1) 





 


Eosinophils # (Auto)


 


 


 0.0 x10^3/uL


(0.0-0.7) 





 


Basophils # (Auto)


 


 


 0.0 x10^3/uL


(0.0-0.2) 





 


Sodium Level


 


 


 141 mmol/L


(136-145) 





 


Potassium Level


 


 


 3.3 mmol/L


(3.5-5.1) 





 


Chloride Level


 


 


 103 mmol/L


() 





 


Carbon Dioxide Level


 


 


 25 mmol/L


(21-32) 





 


Anion Gap   13 (6-14)  


 


Blood Urea Nitrogen


 


 


 92 mg/dL


(7-20) 





 


Creatinine


 


 


 1.9 mg/dL


(0.6-1.0) 





 


Estimated GFR


(Cockcroft-Gault) 


 


 28.1 


 





 


BUN/Creatinine Ratio   48 (6-20)  


 


Glucose Level


 


 


 152 mg/dL


(70-99) 





 


Calcium Level


 


 


 8.6 mg/dL


(8.5-10.1) 





 


Phosphorus Level


 


 


 5.2 mg/dL


(2.6-4.7) 





 


Magnesium Level


 


 


 1.7 mg/dL


(1.8-2.4) 





 


Total Bilirubin


 


 


 0.4 mg/dL


(0.2-1.0) 





 


Aspartate Amino Transf


(AST/SGOT) 


 


 23 U/L (15-37) 


 





 


Alanine Aminotransferase


(ALT/SGPT) 


 


 11 U/L (14-59) 


 





 


Alkaline Phosphatase


 


 


 49 U/L


() 





 


Total Protein


 


 


 5.1 g/dL


(6.4-8.2) 





 


Albumin


 


 


 2.6 g/dL


(3.4-5.0) 





 


Albumin/Globulin Ratio   1.0 (1.0-1.7)  








Laboratory Tests








Test


 4/17/20


08:00 4/17/20


13:08 4/17/20


17:06 4/18/20


06:10


 


O2 Saturation 98 % (92-99)    


 


Arterial Blood pH


 7.47


(7.35-7.45) 


 


 





 


Arterial Blood pCO2 at


Patient Temp 33 mmHg


(35-46) 


 


 





 


Arterial Blood pO2 at Patient


Temp 136 mmHg


() 


 


 





 


Arterial Blood HCO3


 24 mmol/L


(21-28) 


 


 





 


Arterial Blood Base Excess


 0 mmol/L


(-3-3) 


 


 





 


FiO2 35    


 


Glucose (Fingerstick)


 


 181 mg/dL


(70-99) 146 mg/dL


(70-99) 





 


White Blood Count


 


 


 


 6.3 x10^3/uL


(4.0-11.0)


 


Red Blood Count


 


 


 


 2.23 x10^6/uL


(3.50-5.40)


 


Hemoglobin


 


 


 


 7.1 g/dL


(12.0-15.5)


 


Hematocrit


 


 


 


 21.1 %


(36.0-47.0)


 


Mean Corpuscular Volume    95 fL () 


 


Mean Corpuscular Hemoglobin    32 pg (25-35) 


 


Mean Corpuscular Hemoglobin


Concent 


 


 


 33 g/dL


(31-37)


 


Red Cell Distribution Width


 


 


 


 18.1 %


(11.5-14.5)


 


Platelet Count


 


 


 


 539 x10^3/uL


(140-400)


 


Neutrophils (%) (Auto)    74 % (31-73) 


 


Lymphocytes (%) (Auto)    12 % (24-48) 


 


Monocytes (%) (Auto)    14 % (0-9) 


 


Eosinophils (%) (Auto)    0 % (0-3) 


 


Basophils (%) (Auto)    0 % (0-3) 


 


Neutrophils # (Auto)


 


 


 


 4.7 x10^3/uL


(1.8-7.7)


 


Lymphocytes # (Auto)


 


 


 


 0.7 x10^3/uL


(1.0-4.8)


 


Monocytes # (Auto)


 


 


 


 0.9 x10^3/uL


(0.0-1.1)


 


Eosinophils # (Auto)


 


 


 


 0.0 x10^3/uL


(0.0-0.7)


 


Basophils # (Auto)


 


 


 


 0.0 x10^3/uL


(0.0-0.2)


 


Sodium Level


 


 


 


 141 mmol/L


(136-145)


 


Potassium Level


 


 


 


 3.3 mmol/L


(3.5-5.1)


 


Chloride Level


 


 


 


 103 mmol/L


()


 


Carbon Dioxide Level


 


 


 


 25 mmol/L


(21-32)


 


Anion Gap    13 (6-14) 


 


Blood Urea Nitrogen


 


 


 


 92 mg/dL


(7-20)


 


Creatinine


 


 


 


 1.9 mg/dL


(0.6-1.0)


 


Estimated GFR


(Cockcroft-Gault) 


 


 


 28.1 





 


BUN/Creatinine Ratio    48 (6-20) 


 


Glucose Level


 


 


 


 152 mg/dL


(70-99)


 


Calcium Level


 


 


 


 8.6 mg/dL


(8.5-10.1)


 


Phosphorus Level


 


 


 


 5.2 mg/dL


(2.6-4.7)


 


Magnesium Level


 


 


 


 1.7 mg/dL


(1.8-2.4)


 


Total Bilirubin


 


 


 


 0.4 mg/dL


(0.2-1.0)


 


Aspartate Amino Transf


(AST/SGOT) 


 


 


 23 U/L (15-37) 





 


Alanine Aminotransferase


(ALT/SGPT) 


 


 


 11 U/L (14-59) 





 


Alkaline Phosphatase


 


 


 


 49 U/L


()


 


Total Protein


 


 


 


 5.1 g/dL


(6.4-8.2)


 


Albumin


 


 


 


 2.6 g/dL


(3.4-5.0)


 


Albumin/Globulin Ratio    1.0 (1.0-1.7) 


 


Test


 4/18/20


07:17 


 


 





 


Glucose (Fingerstick)


 137 mg/dL


(70-99) 


 


 











Microbiology


4/15/20 Aerobic and Anaerobic Culture, Resulted


          Pending


4/15/20 Anaerobic Culture Result 1 (ANSON), Resulted


          Pending


4/15/20 Aerobic Culture, Resulted


          Pending


4/15/20 Aerobic Culture Result 1 (ANSON), Resulted


          Pending


4/15/20 Gram Stain - Final, Resulted


          


4/15/20 Gram Stain Result 1 (ANSON) - Final, Resulted


          


4/15/20 Gram Stain Result 2 (ANSON) - Final, Resulted


          


4/14/20 Blood Culture - Preliminary, Resulted


          NO GROWTH AFTER 3 DAYS


4/12/20 Urine Culture - Final, Complete


          


4/12/20 Urine Culture Result 1 (ANSON) - Final, Complete


Medications





Current Medications


Sodium Chloride 1,000 ml @  1,000 mls/hr Q1H IV  Last administered on 3/16/20at 

03:00;  Start 3/16/20 at 03:00;  Stop 3/16/20 at 03:59;  Status DC


Ondansetron HCl (Zofran) 4 mg 1X  ONCE IVP  Last administered on 3/16/20at 

03:27;  Start 3/16/20 at 03:00;  Stop 3/16/20 at 03:01;  Status DC


Morphine Sulfate (Morphine Sulfate) 4 mg 1X  ONCE IV ;  Start 3/16/20 at 03:00; 

Stop 3/16/20 at 03:01;  Status Cancel


Ketorolac Tromethamine (Toradol 30mg Vial) 30 mg 1X  ONCE IV  Last administered 

on 3/16/20at 02:54;  Start 3/16/20 at 03:00;  Stop 3/16/20 at 03:01;  Status DC


Fentanyl Citrate (Fentanyl 2ml Vial) 25 mcg 1X  ONCE IVP  Last administered on 

3/16/20at 03:23;  Start 3/16/20 at 03:30;  Stop 3/16/20 at 03:31;  Status DC


Fentanyl Citrate (Fentanyl 2ml Vial) 100 mcg STK-MED ONCE .ROUTE ;  Start 

3/16/20 at 03:18;  Stop 3/16/20 at 03:18;  Status DC


Iohexol (Omnipaque 350 Mg/ml) 90 ml 1X  ONCE IV  Last administered on 3/16/20at 

03:25;  Start 3/16/20 at 03:30;  Stop 3/16/20 at 03:31;  Status DC


Info (CONTRAST GIVEN -- Rx MONITORING) 1 each PRN DAILY  PRN MC SEE COMMENTS;  

Start 3/16/20 at 03:30;  Stop 3/18/20 at 03:29;  Status DC


Hydromorphone HCl (Dilaudid) 0.5 mg 1X  ONCE IV  Last administered on 3/16/20at 

03:55;  Start 3/16/20 at 04:30;  Stop 3/16/20 at 04:32;  Status DC


Ondansetron HCl (Zofran) 4 mg PRN Q8HRS  PRN IV NAUSEA/VOMITING 1ST CHOICE;  

Start 3/16/20 at 05:00;  Stop 3/16/20 at 09:27;  Status DC


Morphine Sulfate (Morphine Sulfate) 2 mg PRN Q2HR  PRN IV SEVERE PAIN 7-10 Last 

administered on 3/17/20at 12:26;  Start 3/16/20 at 05:00;  Stop 3/17/20 at 

14:15;  Status DC


Sodium Chloride 1,000 ml @  125 mls/hr Q8H IV  Last administered on 3/16/20at 

20:56;  Start 3/16/20 at 05:00;  Stop 3/17/20 at 04:59;  Status DC


Hydromorphone HCl (Dilaudid) 0.5 mg PRN Q3HRS  PRN IV SEVERE PAIN 7-10 Last 

administered on 3/17/20at 10:06;  Start 3/16/20 at 05:00;  Stop 3/17/20 at 

12:01;  Status DC


Piperacillin Sod/ Tazobactam Sod 4.5 gm/Sodium Chloride 100 ml @  200 mls/hr 1X 

ONCE IV  Last administered on 3/16/20at 05:44;  Start 3/16/20 at 06:00;  Stop 

3/16/20 at 06:29;  Status DC


Ondansetron HCl (Zofran) 4 mg PRN Q4HRS  PRN IV NAUSEA/VOMITING 1ST CHOICE Last 

administered on 4/17/20at 18:01;  Start 3/16/20 at 09:30


Insulin Human Lispro (HumaLOG) 0-9 UNITS Q6HRS SQ  Last administered on 

4/17/20at 12:00;  Start 3/16/20 at 09:30


Dextrose (Dextrose 50%-Water Syringe) 12.5 gm PRN Q15MIN  PRN IV SEE COMMENTS;  

Start 3/16/20 at 09:30


Pantoprazole Sodium (PROTONIX VIAL for IV PUSH) 40 mg DAILYAC IVP  Last 

administered on 4/17/20at 09:49;  Start 3/16/20 at 11:30


Prochlorperazine Edisylate (Compazine) 10 mg PRN Q6HRS  PRN IV NAUSEA/VOMITING, 

2nd CHOICE Last administered on 4/17/20at 20:45;  Start 3/16/20 at 17:45


Atenolol (Tenormin) 100 mg DAILY PO ;  Start 3/17/20 at 09:00;  Stop 3/16/20 at 

20:08;  Status DC


Metoprolol Tartrate (Lopressor Vial) 2.5 mg Q6HRS IVP  Last administered on 

3/17/20at 05:51;  Start 3/16/20 at 20:15;  Stop 3/17/20 at 10:02;  Status DC


Metoprolol Tartrate (Lopressor Vial) 5 mg Q6HRS IVP  Last administered on 

3/26/20at 00:12;  Start 3/17/20 at 10:15;  Stop 3/28/20 at 08:48;  Status DC


Hydromorphone HCl (Dilaudid) 1 mg PRN Q3HRS  PRN IV SEVERE PAIN 7-10 Last 

administered on 3/23/20at 05:13;  Start 3/17/20 at 12:00;  Stop 3/31/20 at 

00:25;  Status DC


Lidocaine HCl (Buffered Lidocaine 1%) 3 ml STK-MED ONCE .ROUTE ;  Start 3/17/20 

at 12:55;  Stop 3/17/20 at 12:56;  Status DC


Albumin Human 500 ml @  125 mls/hr 1X  ONCE IV  Last administered on 3/17/20at 

14:33;  Start 3/17/20 at 14:30;  Stop 3/17/20 at 18:32;  Status DC


Norepinephrine Bitartrate 8 mg/ Dextrose 258 ml @  17.299 mls/ hr CONT  PRN IV 

PER PROTOCOL Last administered on 4/14/20at 12:48;  Start 3/17/20 at 15:30;  

Stop 4/17/20 at 09:19;  Status DC


Sodium Chloride 1,000 ml @  125 mls/hr Q8H IV  Last administered on 3/17/20at 

21:04;  Start 3/17/20 at 16:00;  Stop 3/18/20 at 02:42;  Status DC


Albumin Human 500 ml @  125 mls/hr PRN BID  PRN IV After every 2L NSS & BP < 

90mm Last administered on 4/1/20at 14:21;  Start 3/17/20 at 16:00


Iohexol (Omnipaque 300 Mg/ml) 60 ml 1X  ONCE IV  Last administered on 3/17/20at 

17:20;  Start 3/17/20 at 17:00;  Stop 3/17/20 at 17:01;  Status DC


Info (CONTRAST GIVEN -- Rx MONITORING) 1 each PRN DAILY  PRN MC SEE COMMENTS;  

Start 3/17/20 at 17:00;  Stop 3/19/20 at 16:59;  Status DC


Meropenem 1 gm/ Sodium Chloride 100 ml @  200 mls/hr Q8HRS IV  Last administered

on 3/18/20at 05:45;  Start 3/17/20 at 20:00;  Stop 3/18/20 at 08:48;  Status DC


Furosemide (Lasix) 40 mg 1X  ONCE IVP  Last administered on 3/17/20at 22:12;  

Start 3/17/20 at 22:30;  Stop 3/17/20 at 22:31;  Status DC


Calcium Chloride 1000 mg/Sodium Chloride 110 ml @  220 mls/hr 1X  ONCE IV  Last 

administered on 3/17/20at 22:11;  Start 3/17/20 at 22:30;  Stop 3/17/20 at 

22:59;  Status DC


Albuterol Sulfate (Ventolin Neb Soln) 2.5 mg 1X  ONCE NEB  Last administered on 

3/18/20at 00:56;  Start 3/17/20 at 22:30;  Stop 3/17/20 at 22:31;  Status DC


Insulin Human Regular (HumuLIN R VIAL) 5 unit 1X  ONCE IV  Last administered on 

3/17/20at 22:14;  Start 3/17/20 at 22:30;  Stop 3/17/20 at 22:31;  Status DC


Magnesium Sulfate 50 ml @ 25 mls/hr 1X  ONCE IV  Last administered on 3/18/20at 

02:57;  Start 3/18/20 at 03:00;  Stop 3/18/20 at 04:59;  Status DC


Calcium Gluconate 1000 mg/Sodium Chloride 110 ml @  220 mls/hr 1X  ONCE IV  Last

administered on 3/18/20at 02:46;  Start 3/18/20 at 03:00;  Stop 3/18/20 at 

03:29;  Status DC


Sodium Chloride 1,000 ml @  200 mls/hr Q5H IV  Last administered on 3/18/20at 

02:46;  Start 3/18/20 at 03:00;  Stop 3/18/20 at 10:21;  Status DC


Calcium Gluconate 1000 mg/Sodium Chloride 110 ml @  220 mls/hr 1X  ONCE IV  Last

administered on 3/18/20at 03:21;  Start 3/18/20 at 03:30;  Stop 3/18/20 at 

03:59;  Status DC


Sodium Bicarbonate 50 meq/Sodium Chloride 1,050 ml @  75 mls/hr Q14H IV  Last 

administered on 3/22/20at 21:10;  Start 3/18/20 at 07:30;  Stop 3/23/20 at 

10:28;  Status DC


Calcium Gluconate 2000 mg/Sodium Chloride 120 ml @  220 mls/hr 1X  ONCE IV  Last

administered on 3/18/20at 09:05;  Start 3/18/20 at 07:30;  Stop 3/18/20 at 

08:02;  Status DC


Lidocaine HCl (Xylocaine-Mpf 1% 2ml Vial) 2 ml STK-MED ONCE .ROUTE ;  Start 3/1

8/20 at 08:47;  Stop 3/18/20 at 08:47;  Status DC


Meropenem 500 mg/ Sodium Chloride 50 ml @  100 mls/hr Q12HR IV  Last administe

red on 3/23/20at 21:01;  Start 3/18/20 at 18:00;  Stop 3/24/20 at 07:58;  Status

DC


Lidocaine HCl (Buffered Lidocaine 1%) 3 ml STK-MED ONCE .ROUTE ;  Start 3/18/20 

at 09:46;  Stop 3/18/20 at 09:46;  Status DC


Lidocaine HCl (Buffered Lidocaine 1%) 6 ml 1X  ONCE INJ  Last administered on 

3/18/20at 10:26;  Start 3/18/20 at 10:15;  Stop 3/18/20 at 10:16;  Status DC


Info (Tpn Per Pharmacy) 1 each PRN DAILY  PRN MC SEE COMMENTS Last administered 

on 4/17/20at 13:35;  Start 3/18/20 at 12:00


Sodium Chloride 1,000 ml @  1,000 mls/hr Q1H PRN IV hypotension;  Start 3/18/20 

at 12:07;  Stop 3/18/20 at 18:06;  Status DC


Diphenhydramine HCl (Benadryl) 25 mg 1X PRN  PRN IV ITCHING;  Start 3/18/20 at 

12:15;  Stop 3/19/20 at 12:14;  Status DC


Diphenhydramine HCl (Benadryl) 25 mg 1X PRN  PRN IV ITCHING;  Start 3/18/20 at 

12:15;  Stop 3/19/20 at 12:14;  Status DC


Sodium Chloride 1,000 ml @  400 mls/hr Q2H30M PRN IV PATENCY;  Start 3/18/20 at 

12:07;  Stop 3/19/20 at 00:06;  Status DC


Info (PHARMACY MONITORING -- do not chart) 1 each PRN DAILY  PRN MC SEE 

COMMENTS;  Start 3/18/20 at 12:15;  Stop 3/20/20 at 08:13;  Status DC


Sodium Chloride 90 meq/Calcium Gluconate 10 meq/ Multivitamins 10 ml/Chromium/ 

Copper/Manganese/ Seleni/Zn 1 ml/ Total Parenteral Nutrition/Amino 

Acids/Dextrose/ Fat Emulsion Intravenous 55.005 ml  @ 2.292 mls/hr TPN  CONT IV 

;  Start 3/18/20 at 22:00;  Stop 3/18/20 at 12:33;  Status DC


Info (Tpn Per Pharmacy) 1 each PRN DAILY  PRN MC SEE COMMENTS;  Start 3/18/20 at

12:30;  Status UNV


Sodium Chloride 90 meq/Calcium Gluconate 10 meq/ Multivitamins 10 ml/Chromium/ 

Copper/Manganese/ Seleni/Zn 0.5 ml/ Total Parenteral Nutrition/Amino Acids/Dex

trose/ Fat Emulsion Intravenous 1,512 ml @  63 mls/hr TPN  CONT IV  Last 

administered on 3/18/20at 22:06;  Start 3/18/20 at 22:00;  Stop 3/19/20 at 

21:59;  Status DC


Calcium Carbonate/ Glycine (Tums) 500 mg PRN AFTMEALHC  PRN PO INDIGESTION;  

Start 3/18/20 at 17:45


Calcium Gluconate (Calcium Gluconate) 2,000 mg 1X  ONCE IVP  Last administered 

on 3/19/20at 02:19;  Start 3/19/20 at 02:15;  Stop 3/19/20 at 02:16;  Status DC


Calcium Chloride 3000 mg/Sodium Chloride 1,030 ml @  50 mls/hr [id number] IV  Last 

administered on 3/21/20at 02:17;  Start 3/19/20 at 08:00;  Stop 3/21/20 at 

15:23;  Status DC


Lorazepam (Ativan Inj) 1 mg PRN Q4HRS  PRN IVP ANXIETY / AGITATION, 2nd choic 

Last administered on 4/17/20at 03:51;  Start 3/19/20 at 09:00;  Stop 4/17/20 at 

09:19;  Status DC


Sodium Chloride 1,000 ml @  1,000 mls/hr Q1H PRN IV hypotension;  Start 3/19/20 

at 08:56;  Stop 3/19/20 at 14:55;  Status DC


Albumin Human 200 ml @  200 mls/hr 1X PRN  PRN IV Hypotension;  Start 3/19/20 at

09:00;  Stop 3/19/20 at 14:59;  Status DC


Diphenhydramine HCl (Benadryl) 25 mg 1X PRN  PRN IV ITCHING;  Start 3/19/20 at 

09:00;  Stop 3/20/20 at 08:59;  Status DC


Diphenhydramine HCl (Benadryl) 25 mg 1X PRN  PRN IV ITCHING;  Start 3/19/20 at 

09:00;  Stop 3/20/20 at 08:59;  Status DC


Sodium Chloride 1,000 ml @  400 mls/hr Q2H30M PRN IV PATENCY;  Start 3/19/20 at 

08:56;  Stop 3/19/20 at 20:55;  Status DC


Info (PHARMACY MONITORING -- do not chart) 1 each PRN DAILY  PRN MC SEE 

COMMENTS;  Start 3/19/20 at 09:00;  Status UNV


Info (PHARMACY MONITORING -- do not chart) 1 each PRN DAILY  PRN MC SEE 

COMMENTS;  Start 3/19/20 at 09:00;  Stop 3/20/20 at 08:13;  Status DC


Digoxin (Lanoxin) 500 mcg 1X  ONCE IV  Last administered on 3/19/20at 10:04;  

Start 3/19/20 at 10:00;  Stop 3/19/20 at 10:01;  Status DC


Digoxin (Lanoxin) 125 mcg 1X  ONCE IV  Last administered on 3/19/20at 17:10;  

Start 3/19/20 at 18:00;  Stop 3/19/20 at 18:01;  Status DC


Magnesium Sulfate 100 ml @  25 mls/hr 1X  ONCE IV  Last administered on 

3/19/20at 12:48;  Start 3/19/20 at 13:00;  Stop 3/19/20 at 16:59;  Status DC


Sodium Chloride 90 meq/Magnesium Sulfate 10 meq/ Calcium Gluconate 20 meq/ 

Multivitamins 10 ml/Chromium/ Copper/Manganese/ Seleni/Zn 0.5 ml/ Total 

Parenteral Nutrition/Amino Acids/Dextrose/ Fat Emulsion Intravenous 1,512 ml @  

63 mls/hr TPN  CONT IV  Last administered on 3/19/20at 22:25;  Start 3/19/20 at 

22:00;  Stop 3/20/20 at 21:59;  Status DC


Sodium Chloride 1,000 ml @  1,000 mls/hr Q1H PRN IV hypotension;  Start 3/20/20 

at 08:05;  Stop 3/20/20 at 14:04;  Status DC


Albumin Human 200 ml @  200 mls/hr 1X  ONCE IV  Last administered on 3/20/20at 

08:57;  Start 3/20/20 at 08:15;  Stop 3/20/20 at 09:14;  Status DC


Diphenhydramine HCl (Benadryl) 25 mg 1X PRN  PRN IV ITCHING;  Start 3/20/20 at 

08:15;  Stop 3/21/20 at 08:14;  Status DC


Diphenhydramine HCl (Benadryl) 25 mg 1X PRN  PRN IV ITCHING;  Start 3/20/20 at 

08:15;  Stop 3/21/20 at 08:14;  Status DC


Sodium Chloride 1,000 ml @  400 mls/hr Q2H30M PRN IV PATENCY;  Start 3/20/20 at 

08:05;  Stop 3/20/20 at 20:04;  Status DC


Info (PHARMACY MONITORING -- do not chart) 1 each PRN DAILY  PRN MC SEE 

COMMENTS;  Start 3/20/20 at 08:15;  Stop 3/24/20 at 07:57;  Status DC


Sodium Chloride 90 meq/Potassium Chloride 15 meq/ Potassium Phosphate 10 mmol/ 

Magnesium Sulfate 10 meq/Calcium Gluconate 20 meq/ Multivitamins 10 ml/Chromium/

Copper/Manganese/ Seleni/Zn 0.5 ml/ Total Parenteral Nutrition/Amino 

Acids/Dextrose/ Fat Emulsion Intravenous 1,512 ml @  63 mls/hr TPN  CONT IV  

Last administered on 3/20/20at 21:01;  Start 3/20/20 at 22:00;  Stop 3/21/20 at 

21:59;  Status DC


Potassium Chloride/Water 100 ml @  100 mls/hr 1X  ONCE IV  Last administered on 

3/20/20at 14:09;  Start 3/20/20 at 14:00;  Stop 3/20/20 at 14:59;  Status DC


Benzocaine (Hurricaine One) 1 spray 1X  ONCE MM  Last administered on 3/20/20at 

16:38;  Start 3/20/20 at 14:30;  Stop 3/20/20 at 14:31;  Status DC


Lidocaine HCl (Glydo (Lidocaine) Jelly) 1 thomas 1X  ONCE MM  Last administered on 

3/20/20at 16:38;  Start 3/20/20 at 14:30;  Stop 3/20/20 at 14:31;  Status DC


Linezolid/Dextrose 300 ml @  300 mls/hr Q12HR IV  Last administered on 3/26/20at

21:04;  Start 3/20/20 at 20:00;  Stop 3/27/20 at 07:50;  Status DC


Acetaminophen (Tylenol) 650 mg PRN Q6HRS  PRN PO MILD PAIN / TEMP;  Start 

3/21/20 at 03:30;  Stop 3/21/20 at 03:36;  Status DC


Acetaminophen (Tylenol) 650 mg PRN Q6HRS  PRN PEG MILD PAIN / TEMP Last 

administered on 4/16/20at 19:56;  Start 3/21/20 at 03:36


Sodium Chloride 1,000 ml @  1,000 mls/hr Q1H PRN IV hypotension;  Start 3/21/20 

at 07:50;  Stop 3/21/20 at 13:49;  Status DC


Albumin Human 200 ml @  200 mls/hr 1X PRN  PRN IV Hypotension;  Start 3/21/20 at

08:00;  Stop 3/21/20 at 13:59;  Status DC


Sodium Chloride (Normal Saline Flush) 10 ml 1X PRN  PRN IV AP catheter pack;  

Start 3/21/20 at 08:00;  Stop 3/22/20 at 07:59;  Status DC


Sodium Chloride (Normal Saline Flush) 10 ml 1X PRN  PRN IV  catheter pack;  

Start 3/21/20 at 08:00;  Stop 3/22/20 at 07:59;  Status DC


Sodium Chloride 1,000 ml @  400 mls/hr Q2H30M PRN IV PATENCY;  Start 3/21/20 at 

07:50;  Stop 3/21/20 at 19:49;  Status DC


Info (PHARMACY MONITORING -- do not chart) 1 each PRN DAILY  PRN MC SEE 

COMMENTS;  Start 3/21/20 at 08:00;  Status UNV


Info (PHARMACY MONITORING -- do not chart) 1 each PRN DAILY  PRN MC SEE 

COMMENTS;  Start 3/21/20 at 08:00;  Stop 3/23/20 at 08:25;  Status DC


Sodium Chloride 90 meq/Potassium Chloride 15 meq/ Potassium Phosphate 10 mmol/ 

Magnesium Sulfate 10 meq/Calcium Gluconate 20 meq/ Multivitamins 10 ml/Chromium/

Copper/Manganese/ Seleni/Zn 0.5 ml/ Total Parenteral Nutrition/Amino 

Acids/Dextrose/ Fat Emulsion Intravenous 1,512 ml @  63 mls/hr TPN  CONT IV  

Last administered on 3/21/20at 20:57;  Start 3/21/20 at 22:00;  Stop 3/22/20 at 

21:59;  Status DC


Sodium Chloride 90 meq/Potassium Chloride 15 meq/ Potassium Phosphate 15 mmol/ 

Magnesium Sulfate 10 meq/Calcium Gluconate 20 meq/ Multivitamins 10 ml/Chromium/

Copper/Manganese/ Seleni/Zn 0.5 ml/ Total Parenteral Nutrition/Amino 

Acids/Dextrose/ Fat Emulsion Intravenous 1,512 ml @  63 mls/hr TPN  CONT IV ;  

Start 3/22/20 at 22:00;  Stop 3/22/20 at 14:16;  Status DC


Sodium Chloride 90 meq/Potassium Chloride 15 meq/ Potassium Phosphate 15 mmol/ 

Magnesium Sulfate 10 meq/Calcium Gluconate 20 meq/ Multivitamins 10 ml/Chromium/

Copper/Manganese/ Seleni/Zn 0.5 ml/ Total Parenteral Nutrition/Amino 

Acids/Dextrose/ Fat Emulsion Intravenous 1,200 ml @  50 mls/hr TPN  CONT IV ;  

Start 3/22/20 at 22:00;  Stop 3/22/20 at 14:17;  Status DC


Sodium Chloride 90 meq/Potassium Chloride 15 meq/ Potassium Phosphate 10 mmol/ 

Magnesium Sulfate 10 meq/Calcium Gluconate 20 meq/ Multivitamins 10 ml/Chromium/

Copper/Manganese/ Seleni/Zn 0.5 ml/ Total Parenteral Nutrition/Amino 

Acids/Dextrose/ Fat Emulsion Intravenous 1,200 ml @  50 mls/hr TPN  CONT IV  

Last administered on 3/22/20at 23:29;  Start 3/22/20 at 22:00;  Stop 3/23/20 at 

21:59;  Status DC


Sodium Chloride 1,000 ml @  1,000 mls/hr Q1H PRN IV hypotension;  Start 3/23/20 

at 07:28;  Stop 3/23/20 at 13:27;  Status DC


Albumin Human 200 ml @  200 mls/hr 1X  ONCE IV  Last administered on 3/23/20at 

08:51;  Start 3/23/20 at 07:30;  Stop 3/23/20 at 08:29;  Status DC


Diphenhydramine HCl (Benadryl) 25 mg 1X PRN  PRN IV ITCHING;  Start 3/23/20 at 

07:30;  Stop 3/24/20 at 07:29;  Status DC


Diphenhydramine HCl (Benadryl) 25 mg 1X PRN  PRN IV ITCHING;  Start 3/23/20 at 

07:30;  Stop 3/24/20 at 07:29;  Status DC


Sodium Chloride 1,000 ml @  400 mls/hr Q2H30M PRN IV PATENCY;  Start 3/23/20 at 

07:28;  Stop 3/23/20 at 19:27;  Status DC


Info (PHARMACY MONITORING -- do not chart) 1 each PRN DAILY  PRN MC SEE 

COMMENTS;  Start 3/23/20 at 07:30;  Stop 4/3/20 at 13:01;  Status DC


Metronidazole 100 ml @  100 mls/hr Q6HRS IV  Last administered on 4/8/20at 

06:26;  Start 3/23/20 at 08:30;  Stop 4/8/20 at 09:58;  Status DC


Micafungin Sodium 100 mg/Dextrose 100 ml @  100 mls/hr Q24H IV  Last 

administered on 4/17/20at 09:50;  Start 3/23/20 at 09:00


Propofol 0 ml @ As Directed STK-MED ONCE IV ;  Start 3/23/20 at 07:53;  Stop 

3/23/20 at 07:53;  Status DC


Etomidate (Amidate) 20 mg STK-MED ONCE IV ;  Start 3/23/20 at 07:53;  Stop 

3/23/20 at 07:54;  Status DC


Midazolam HCl (Versed) 5 mg STK-MED ONCE .ROUTE ;  Start 3/23/20 at 07:57;  Stop

3/23/20 at 07:57;  Status DC


Fentanyl Citrate 30 ml @ 0 mls/hr CONT  PRN IV SEE PROTOCOL Last administered on

4/17/20at 06:12;  Start 3/23/20 at 08:15;  Stop 4/17/20 at 09:19;  Status DC


Artificial Tears (Artificial Tears) 1 drop PRN Q1HR  PRN OU DRY EYE, 1st choice;

 Start 3/23/20 at 08:15


Midazolam HCl 50 mg/Sodium Chloride 50 ml @ 0 mls/hr CONT  PRN IV SEE PROTOCOL 

Last administered on 3/26/20at 22:39;  Start 3/23/20 at 08:15;  Stop 3/28/20 at 

15:59;  Status DC


Etomidate (Amidate) 8 mg 1X  ONCE IV  Last administered on 3/23/20at 08:33;  

Start 3/23/20 at 08:30;  Stop 3/23/20 at 08:31;  Status DC


Succinylcholine Chloride (Anectine) 120 mg 1X  ONCE IV  Last administered on 

3/23/20at 08:34;  Start 3/23/20 at 08:30;  Stop 3/23/20 at 08:31;  Status DC


Midazolam HCl (Versed) 5 mg 1X  ONCE IV ;  Start 3/23/20 at 08:30;  Stop 3/23/20

at 08:31;  Status DC


Potassium Chloride 15 meq/ Bicarbonate Dialysis Soln w/ out KCl 5,007.5 ml  @ 

1,000 mls/ hr Q5H1M IV  Last administered on 3/24/20at 11:11;  Start 3/23/20 at 

12:00;  Stop 3/24/20 at 11:15;  Status DC


Potassium Chloride 15 meq/ Bicarbonate Dialysis Soln w/ out KCl 5,007.5 ml  @ 

1,000 mls/ hr Q5H1M IV  Last administered on 3/24/20at 11:12;  Start 3/23/20 at 

12:00;  Stop 3/24/20 at 11:17;  Status DC


Potassium Chloride 15 meq/ Bicarbonate Dialysis Soln w/ out KCl 5,007.5 ml  @ 

1,000 mls/ hr Q5H1M IV  Last administered on 3/24/20at 11:11;  Start 3/23/20 at 

12:00;  Stop 3/24/20 at 11:19;  Status DC


Sodium Chloride 90 meq/Potassium Chloride 15 meq/ Potassium Phosphate 10 mmol/ 

Magnesium Sulfate 10 meq/Calcium Gluconate 20 meq/ Multivitamins 10 ml/Chromium/

Copper/Manganese/ Seleni/Zn 0.5 ml/ Total Parenteral Nutrition/Amino 

Acids/Dextrose/ Fat Emulsion Intravenous 1,400 ml @  58.333 mls/ hr TPN  CONT IV

 Last administered on 3/23/20at 21:42;  Start 3/23/20 at 22:00;  Stop 3/24/20 at

21:59;  Status DC


Heparin Sodium (Porcine) (Heparin Sodium) 5,000 unit Q8HRS SQ  Last administered

on 3/28/20at 05:55;  Start 3/23/20 at 15:00;  Stop 3/28/20 at 13:28;  Status DC


Meropenem 500 mg/ Sodium Chloride 50 ml @  100 mls/hr Q6HRS IV  Last 

administered on 3/25/20at 06:00;  Start 3/24/20 at 09:00;  Stop 3/25/20 at 

07:29;  Status DC


Potassium Phosphate 20 mmol/ Sodium Chloride 106.6667 ml @  51.667 m... 1X  ONCE

IV  Last administered on 3/24/20at 11:22;  Start 3/24/20 at 10:15;  Stop 3/24/20

at 12:18;  Status DC


Acetaminophen (Tylenol Supp) 650 mg PRN Q6HRS  PRN MI MILD PAIN / TEMP Last 

administered on 4/17/20at 10:25;  Start 3/24/20 at 10:30


Potassium Chloride/Water 100 ml @  100 mls/hr Q1H IV  Last administered on 

3/24/20at 12:12;  Start 3/24/20 at 11:00;  Stop 3/24/20 at 12:59;  Status DC


Potassium Chloride 20 meq/ Bicarbonate Dialysis Soln w/ out KCl 5,010 ml @  

1,000 mls/hr Q5H1M IV  Last administered on 3/25/20at 08:48;  Start 3/24/20 at 

12:00;  Stop 3/25/20 at 13:03;  Status DC


Potassium Chloride 20 meq/ Bicarbonate Dialysis Soln w/ out KCl 5,010 ml @  

1,000 mls/hr Q5H1M IV  Last administered on 3/29/20at 14:52;  Start 3/24/20 at 

11:30;  Stop 3/29/20 at 19:59;  Status DC


Potassium Chloride 20 meq/ Bicarbonate Dialysis Soln w/ out KCl 5,010 ml @  

1,000 mls/hr Q5H1M IV  Last administered on 3/29/20at 14:53;  Start 3/24/20 at 

11:30;  Stop 3/29/20 at 19:59;  Status DC


Sodium Chloride 90 meq/Potassium Chloride 15 meq/ Potassium Phosphate 15 mmol/ 

Magnesium Sulfate 10 meq/Calcium Gluconate 15 meq/ Multivitamins 10 ml/Chromium/

Copper/Manganese/ Seleni/Zn 0.5 ml/ Total Parenteral Nutrition/Amino 

Acids/Dextrose/ Fat Emulsion Intravenous 1,400 ml @  58.333 mls/ hr TPN  CONT IV

 Last administered on 3/24/20at 22:17;  Start 3/24/20 at 22:00;  Stop 3/25/20 at

21:59;  Status DC


Cefepime HCl (Maxipime) 2 gm Q12HR IVP  Last administered on 4/7/20at 20:56;  

Start 3/25/20 at 09:00;  Stop 4/8/20 at 09:58;  Status DC


Daptomycin 500 mg/ Sodium Chloride 50 ml @  100 mls/hr Q48H IV  Last 

administered on 4/10/20at 09:57;  Start 3/25/20 at 08:30;  Stop 4/10/20 at 

10:07;  Status DC


Lidocaine HCl (Buffered Lidocaine 1%) 3 ml 1X  ONCE INJ  Last administered on 

3/25/20at 10:27;  Start 3/25/20 at 10:30;  Stop 3/25/20 at 10:31;  Status DC


Potassium Phosphate 20 mmol/ Sodium Chloride 106.6667 ml @  51.667 m... 1X  ONCE

IV  Last administered on 3/25/20at 12:51;  Start 3/25/20 at 13:00;  Stop 3/25/20

at 15:03;  Status DC


Sodium Chloride 90 meq/Potassium Chloride 15 meq/ Potassium Phosphate 18 mmol/ 

Magnesium Sulfate 8 meq/Calcium Gluconate 15 meq/ Multivitamins 10 ml/Chromium/ 

Copper/Manganese/ Seleni/Zn 0.5 ml/ Total Parenteral Nutrition/Amino 

Acids/Dextrose/ Fat Emulsion Intravenous 1,400 ml @  58.333 mls/ hr TPN  CONT IV

 Last administered on 3/25/20at 22:16;  Start 3/25/20 at 22:00;  Stop 3/26/20 at

21:59;  Status DC


Potassium Chloride 20 meq/ Bicarbonate Dialysis Soln w/ out KCl 5,010 ml @  

1,000 mls/hr Q5H1M IV  Last administered on 3/29/20at 14:54;  Start 3/25/20 at 

16:00;  Stop 3/29/20 at 19:59;  Status DC


Multi-Ingred Cream/Lotion/Oil/ Oint (Artificial Tears Eye Ointment) 1 thomas PRN 

Q1HR  PRN OU DRY EYE, 2nd choice Last administered on 4/13/20at 08:19;  Start 

3/25/20 at 17:30


Sodium Chloride 90 meq/Potassium Chloride 15 meq/ Potassium Phosphate 18 mmol/ 

Magnesium Sulfate 8 meq/Calcium Gluconate 15 meq/ Multivitamins 10 ml/Chromium/ 

Copper/Manganese/ Seleni/Zn 0.5 ml/ Total Parenteral Nutrition/Amino 

Acids/Dextrose/ Fat Emulsion Intravenous 1,400 ml @  58.333 mls/ hr TPN  CONT IV

 Last administered on 3/26/20at 22:00;  Start 3/26/20 at 22:00;  Stop 3/27/20 at

21:59;  Status DC


Albumin Human 500 ml @  125 mls/hr 1X  ONCE IV ;  Start 3/26/20 at 14:15;  Stop 

3/26/20 at 18:14;  Status DC


Sodium Chloride 90 meq/Potassium Chloride 15 meq/ Potassium Phosphate 18 mmol/ 

Magnesium Sulfate 8 meq/Calcium Gluconate 15 meq/ Multivitamins 10 ml/Chromium/ 

Copper/Manganese/ Seleni/Zn 0.5 ml/ Insulin Human Regular 10 unit/ Total 

Parenteral Nutrition/Amino Acids/Dextrose/ Fat Emulsion Intravenous 1,400 ml @  

58.333 mls/ hr TPN  CONT IV  Last administered on 3/27/20at 21:43;  Start 

3/27/20 at 22:00;  Stop 3/28/20 at 21:59;  Status DC


Lidocaine HCl (Buffered Lidocaine 1%) 3 ml STK-MED ONCE .ROUTE ;  Start 3/25/20 

at 10:00;  Stop 3/27/20 at 13:57;  Status DC


Midazolam HCl 100 mg/Sodium Chloride 100 ml @ 7 mls/hr CONT  PRN IV SEE PROTOCOL

Last administered on 4/8/20at 15:35;  Start 3/28/20 at 16:00


Sodium Chloride 90 meq/Potassium Chloride 15 meq/ Potassium Phosphate 18 mmol/ 

Magnesium Sulfate 8 meq/Calcium Gluconate 15 meq/ Multivitamins 10 ml/Chromium/ 

Copper/Manganese/ Seleni/Zn 0.5 ml/ Insulin Human Regular 15 unit/ Total 

Parenteral Nutrition/Amino Acids/Dextrose/ Fat Emulsion Intravenous 1,400 ml @  

58.333 mls/ hr TPN  CONT IV  Last administered on 3/28/20at 20:34;  Start 

3/28/20 at 22:00;  Stop 3/29/20 at 21:59;  Status DC


Info ([facility] Electrolyte Protocol) 1 ea CONT PRN  PRN MC PER PROTOCOL;  Start 

3/29/20 at 13:15


Sodium Chloride 90 meq/Potassium Chloride 15 meq/ Potassium Phosphate 18 mmol/ 

Magnesium Sulfate 8 meq/Calcium Gluconate 15 meq/ Multivitamins 10 ml/Chromium/ 

Copper/Manganese/ Seleni/Zn 0.5 ml/ Insulin Human Regular 15 unit/ Total 

Parenteral Nutrition/Amino Acids/Dextrose/ Fat Emulsion Intravenous 1,400 ml @  

58.333 mls/ hr TPN  CONT IV  Last administered on 3/29/20at 22:05;  Start 

3/29/20 at 22:00;  Stop 3/30/20 at 21:59;  Status DC


Potassium Chloride 15 meq/ Bicarbonate Dialysis Soln w/ out KCl 5,007.5 ml  @ 

1,000 mls/ hr Q5H1M IV  Last administered on 4/1/20at 18:14;  Start 3/29/20 at 

20:00;  Stop 4/2/20 at 13:08;  Status DC


Potassium Chloride 15 meq/ Bicarbonate Dialysis Soln w/ out KCl 5,007.5 ml  @ 

1,000 mls/ hr Q5H1M IV  Last administered on 4/1/20at 18:14;  Start 3/29/20 at 

20:00;  Stop 4/2/20 at 13:08;  Status DC


Potassium Chloride 15 meq/ Bicarbonate Dialysis Soln w/ out KCl 5,007.5 ml  @ 

1,000 mls/ hr Q5H1M IV  Last administered on 4/1/20at 18:14;  Start 3/29/20 at 

20:00;  Stop 4/2/20 at 13:08;  Status DC


Iohexol (Omnipaque 240 Mg/ml) 30 ml 1X  ONCE PO  Last administered on 3/30/20at 

11:30;  Start 3/30/20 at 11:30;  Stop 3/30/20 at 11:33;  Status DC


Info (CONTRAST GIVEN -- Rx MONITORING) 1 each PRN DAILY  PRN MC SEE COMMENTS;  

Start 3/30/20 at 11:45;  Stop 4/1/20 at 11:44;  Status DC


Sodium Chloride 90 meq/Potassium Chloride 15 meq/ Potassium Phosphate 18 mmol/ 

Magnesium Sulfate 8 meq/Calcium Gluconate 15 meq/ Multivitamins 10 ml/Chromium/ 

Copper/Manganese/ Seleni/Zn 0.5 ml/ Insulin Human Regular 15 unit/ Total 

Parenteral Nutrition/Amino Acids/Dextrose/ Fat Emulsion Intravenous 1,400 ml @  

58.333 mls/ hr TPN  CONT IV  Last administered on 3/30/20at 21:47;  Start 

3/30/20 at 22:00;  Stop 3/31/20 at 21:59;  Status DC


Sodium Chloride 90 meq/Potassium Chloride 15 meq/ Potassium Phosphate 18 mmol/ 

Magnesium Sulfate 8 meq/Calcium Gluconate 15 meq/ Multivitamins 10 ml/Chromium/ 

Copper/Manganese/ Seleni/Zn 0.5 ml/ Insulin Human Regular 20 unit/ Total 

Parenteral Nutrition/Amino Acids/Dextrose/ Fat Emulsion Intravenous 1,400 ml @  

58.333 mls/ hr TPN  CONT IV  Last administered on 3/31/20at 21:36;  Start 

3/31/20 at 22:00;  Stop 4/1/20 at 21:59;  Status DC


Alteplase, Recombinant (Cathflo For Central Catheter Clearance) 1 mg 1X  ONCE 

INT CAT  Last administered on 3/31/20at 20:03;  Start 3/31/20 at 19:30;  Stop 

3/31/20 at 19:46;  Status DC


Alteplase, Recombinant (Cathflo For Central Catheter Clearance) 1 mg 1X  ONCE 

INT CAT  Last administered on 3/31/20at 22:05;  Start 3/31/20 at 22:00;  Stop 

3/31/20 at 22:01;  Status DC


Sodium Chloride 90 meq/Potassium Chloride 15 meq/ Potassium Phosphate 18 mmol/ 

Magnesium Sulfate 8 meq/Calcium Gluconate 15 meq/ Multivitamins 10 ml/Chromium/ 

Copper/Manganese/ Seleni/Zn 0.5 ml/ Insulin Human Regular 20 unit/ Total 

Parenteral Nutrition/Amino Acids/Dextrose/ Fat Emulsion Intravenous 1,400 ml @  

58.333 mls/ hr TPN  CONT IV  Last administered on 4/1/20at 21:30;  Start 4/1/20 

at 22:00;  Stop 4/2/20 at 21:59;  Status DC


Dexmedetomidine HCl 400 mcg/ Sodium Chloride 100 ml @ 0 mls/hr CONT  PRN IV 

ANXIETY / AGITATION Last administered on 4/18/20at 03:43;  Start 4/2/20 at 08:15


Sodium Chloride 500 ml @  500 mls/hr 1X PRN  PRN IV ELEVATED BP, SEE COMMENTS;  

Start 4/2/20 at 08:15


Atropine Sulfate (ATROPINE 0.5mg SYRINGE) 0.5 mg PRN Q5MIN  PRN IV SEE COMMENTS;

 Start 4/2/20 at 08:15


Furosemide (Lasix) 20 mg 1X  ONCE IVP  Last administered on 4/2/20at 08:19;  

Start 4/2/20 at 08:15;  Stop 4/2/20 at 08:16;  Status DC


Lidocaine HCl (Buffered Lidocaine 1%) 3 ml Inetec-MED ONCE .ROUTE ;  Start 4/2/20 

at 08:39;  Stop 4/2/20 at 08:39;  Status DC


Lidocaine HCl (Buffered Lidocaine 1%) 6 ml 1X  ONCE INJ  Last administered on 

4/2/20at 09:05;  Start 4/2/20 at 09:00;  Stop 4/2/20 at 09:06;  Status DC


Sodium Chloride 90 meq/Potassium Chloride 15 meq/ Potassium Phosphate 18 mmol/ 

Magnesium Sulfate 8 meq/Calcium Gluconate 15 meq/ Multivitamins 10 ml/Chromium/ 

Copper/Manganese/ Seleni/Zn 0.5 ml/ Insulin Human Regular 20 unit/ Total 

Parenteral Nutrition/Amino Acids/Dextrose/ Fat Emulsion Intravenous 1,400 ml @  

58.333 mls/ hr TPN  CONT IV  Last administered on 4/2/20at 22:45;  Start 4/2/20 

at 22:00;  Stop 4/3/20 at 21:59;  Status DC


Sodium Chloride 1,000 ml @  1,000 mls/hr Q1H PRN IV hypotension;  Start 4/3/20 

at 07:30;  Stop 4/3/20 at 13:29;  Status DC


Albumin Human 200 ml @  200 mls/hr 1X PRN  PRN IV Hypotension Last administered 

on 4/3/20at 09:36;  Start 4/3/20 at 07:30;  Stop 4/3/20 at 13:29;  Status DC


Sodium Chloride (Normal Saline Flush) 10 ml 1X PRN  PRN IV AP catheter pack;  

Start 4/3/20 at 07:30;  Stop 4/3/20 at 21:29;  Status DC


Sodium Chloride (Normal Saline Flush) 10 ml 1X PRN  PRN IV  catheter pack;  

Start 4/3/20 at 07:30;  Stop 4/4/20 at 07:29;  Status DC


Sodium Chloride 1,000 ml @  400 mls/hr Q2H30M PRN IV PATENCY;  Start 4/3/20 at 

07:30;  Stop 4/3/20 at 19:29;  Status DC


Info (PHARMACY MONITORING -- do not chart) 1 each PRN DAILY  PRN MC SEE 

COMMENTS;  Start 4/3/20 at 07:30;  Stop 4/3/20 at 13:02;  Status DC


Info (PHARMACY MONITORING -- do not chart) 1 each PRN DAILY  PRN MC SEE 

COMMENTS;  Start 4/3/20 at 07:30;  Stop 4/5/20 at 12:45;  Status DC


Sodium Chloride 90 meq/Potassium Chloride 15 meq/ Potassium Phosphate 10 mmol/ 

Magnesium Sulfate 8 meq/Calcium Gluconate 15 meq/ Multivitamins 10 ml/Chromium/ 

Copper/Manganese/ Seleni/Zn 0.5 ml/ Insulin Human Regular 25 unit/ Total 

Parenteral Nutrition/Amino Acids/Dextrose/ Fat Emulsion Intravenous 1,400 ml @  

58.333 mls/ hr TPN  CONT IV  Last administered on 4/3/20at 22:19;  Start 4/3/20 

at 22:00;  Stop 4/4/20 at 21:59;  Status DC


Heparin Sodium (Porcine) (Heparin Sodium) 5,000 unit Q12HR SQ  Last administered

on 4/17/20at 22:09;  Start 4/3/20 at 21:00


Ondansetron HCl (Zofran) 4 mg PRN Q6HRS  PRN IV NAUSEA/VOMITING;  Start 4/6/20 

at 07:00;  Stop 4/7/20 at 06:59;  Status DC


Fentanyl Citrate (Fentanyl 2ml Vial) 25 mcg PRN Q5MIN  PRN IV MILD PAIN 1-3;  

Start 4/6/20 at 07:00;  Stop 4/7/20 at 06:59;  Status DC


Fentanyl Citrate (Fentanyl 2ml Vial) 50 mcg PRN Q5MIN  PRN IV MODERATE TO SEVERE

PAIN;  Start 4/6/20 at 07:00;  Stop 4/7/20 at 06:59;  Status DC


Ringer's Solution 1,000 ml @  30 mls/hr Q24H IV ;  Start 4/6/20 at 07:00;  Stop 

4/6/20 at 18:59;  Status DC


Lidocaine HCl (Xylocaine-Mpf 1% 2ml Vial) 2 ml PRN 1X  PRN ID PRIOR TO IV START;

 Start 4/6/20 at 07:00;  Stop 4/7/20 at 06:59;  Status DC


Prochlorperazine Edisylate (Compazine) 5 mg PACU PRN  PRN IV NAUSEA, MRX1;  

Start 4/6/20 at 07:00;  Stop 4/7/20 at 06:59;  Status DC


Sodium Chloride 1,000 ml @  1,000 mls/hr Q1H PRN IV hypotension;  Start 4/4/20 

at 09:10;  Stop 4/4/20 at 15:09;  Status DC


Albumin Human 200 ml @  200 mls/hr 1X PRN  PRN IV Hypotension Last administered 

on 4/4/20at 10:10;  Start 4/4/20 at 09:15;  Stop 4/4/20 at 15:14;  Status DC


Sodium Chloride 1,000 ml @  400 mls/hr Q2H30M PRN IV PATENCY;  Start 4/4/20 at 

09:10;  Stop 4/4/20 at 21:09;  Status DC


Info (PHARMACY MONITORING -- do not chart) 1 each PRN DAILY  PRN MC SEE 

COMMENTS;  Start 4/4/20 at 09:15;  Stop 4/5/20 at 12:45;  Status DC


Info (PHARMACY MONITORING -- do not chart) 1 each PRN DAILY  PRN MC SEE 

COMMENTS;  Start 4/4/20 at 09:15;  Stop 4/5/20 at 12:45;  Status DC


Sodium Chloride 90 meq/Potassium Chloride 15 meq/ Potassium Phosphate 10 mmol/ 

Magnesium Sulfate 8 meq/Calcium Gluconate 15 meq/ Multivitamins 10 ml/Chromium/ 

Copper/Manganese/ Seleni/Zn 0.5 ml/ Insulin Human Regular 25 unit/ Total 

Parenteral Nutrition/Amino Acids/Dextrose/ Fat Emulsion Intravenous 1,400 ml @  

58.333 mls/ hr TPN  CONT IV  Last administered on 4/4/20at 22:10;  Start 4/4/20 

at 22:00;  Stop 4/5/20 at 21:59;  Status DC


Magnesium Sulfate 50 ml @ 25 mls/hr PRN DAILY  PRN IV for Mag < 1.7 on am labs; 

Start 4/5/20 at 09:15


Sodium Chloride 90 meq/Potassium Chloride 15 meq/ Potassium Phosphate 10 mmol/ 

Magnesium Sulfate 8 meq/Calcium Gluconate 15 meq/ Multivitamins 10 ml/Chromium/ 

Copper/Manganese/ Seleni/Zn 0.5 ml/ Insulin Human Regular 25 unit/ Total 

Parenteral Nutrition/Amino Acids/Dextrose/ Fat Emulsion Intravenous 1,400 ml @  

58.333 mls/ hr TPN  CONT IV  Last administered on 4/5/20at 21:20;  Start 4/5/20 

at 22:00;  Stop 4/6/20 at 21:59;  Status DC


Sodium Chloride 1,000 ml @  1,000 mls/hr Q1H PRN IV hypotension;  Start 4/5/20 

at 12:23;  Stop 4/5/20 at 18:22;  Status DC


Albumin Human 200 ml @  200 mls/hr 1X  ONCE IV  Last administered on 4/5/20at 

13:34;  Start 4/5/20 at 12:30;  Stop 4/5/20 at 13:29;  Status DC


Diphenhydramine HCl (Benadryl) 25 mg 1X PRN  PRN IV ITCHING;  Start 4/5/20 at 

12:30;  Stop 4/6/20 at 12:29;  Status DC


Diphenhydramine HCl (Benadryl) 25 mg 1X PRN  PRN IV ITCHING;  Start 4/5/20 at 

12:30;  Stop 4/6/20 at 12:29;  Status DC


Info (PHARMACY MONITORING -- do not chart) 1 each PRN DAILY  PRN MC SEE C

OMMENTS;  Start 4/5/20 at 12:30;  Status Cancel


Bupivacaine HCl/ Epinephrine Bitart (Sensorcain-Epi 0.5%-1:[id number] Mpf) 30 ml STK

-MED ONCE .ROUTE  Last administered on 4/6/20at 11:44;  Start 4/6/20 at 11:00;  

Stop 4/6/20 at 11:01;  Status DC


Cellulose (Surgicel Fibrillar 1x2) 1 each STK-MED ONCE .ROUTE ;  Start 4/6/20 at

11:00;  Stop 4/6/20 at 11:01;  Status DC


Sodium Chloride 90 meq/Potassium Chloride 15 meq/ Potassium Phosphate 10 mmol/ 

Magnesium Sulfate 12 meq/Calcium Gluconate 15 meq/ Multivitamins 10 ml/Chromium/

Copper/Manganese/ Seleni/Zn 0.5 ml/ Insulin Human Regular 25 unit/ Total 

Parenteral Nutrition/Amino Acids/Dextrose/ Fat Emulsion Intravenous 1,400 ml @  

58.333 mls/ hr TPN  CONT IV  Last administered on 4/6/20at 22:24;  Start 4/6/20 

at 22:00;  Stop 4/7/20 at 21:59;  Status DC


Propofol 20 ml @ As Directed STK-MED ONCE IV ;  Start 4/6/20 at 11:07;  Stop 

4/6/20 at 11:07;  Status DC


Cellulose (Surgicel Hemostat 4x8) 1 each STK-MED ONCE .ROUTE  Last administered 

on 4/6/20at 11:44;  Start 4/6/20 at 11:55;  Stop 4/6/20 at 11:56;  Status DC


Sevoflurane (Ultane) 60 ml STK-MED ONCE IH ;  Start 4/6/20 at 12:46;  Stop 

4/6/20 at 12:46;  Status DC


Sodium Chloride 1,000 ml @  1,000 mls/hr Q1H PRN IV hypotension;  Start 4/6/20 

at 13:51;  Stop 4/6/20 at 19:50;  Status DC


Albumin Human 200 ml @  200 mls/hr 1X PRN  PRN IV Hypotension Last administered 

on 4/6/20at 14:51;  Start 4/6/20 at 14:00;  Stop 4/6/20 at 19:59;  Status DC


Diphenhydramine HCl (Benadryl) 25 mg 1X PRN  PRN IV ITCHING;  Start 4/6/20 at 

14:00;  Stop 4/7/20 at 13:59;  Status DC


Diphenhydramine HCl (Benadryl) 25 mg 1X PRN  PRN IV ITCHING;  Start 4/6/20 at 

14:00;  Stop 4/7/20 at 13:59;  Status DC


Sodium Chloride 1,000 ml @  400 mls/hr Q2H30M PRN IV PATENCY;  Start 4/6/20 at 

13:51;  Stop 4/7/20 at 01:50;  Status DC


Info (PHARMACY MONITORING -- do not chart) 1 each PRN DAILY  PRN MC SEE 

COMMENTS;  Start 4/6/20 at 14:00;  Stop 4/9/20 at 08:16;  Status DC


Heparin Sodium (Porcine) (Hep Lock Adult) 500 unit STK-MED ONCE IVP ;  Start 

4/7/20 at 09:29;  Stop 4/7/20 at 09:30;  Status DC


Sodium Chloride 1,000 ml @  1,000 mls/hr Q1H PRN IV hypotension;  Start 4/7/20 

at 10:43;  Stop 4/7/20 at 16:42;  Status DC


Sodium Chloride 1,000 ml @  400 mls/hr Q2H30M PRN IV PATENCY;  Start 4/7/20 at 

10:43;  Stop 4/7/20 at 22:42;  Status DC


Info (PHARMACY MONITORING -- do not chart) 1 each PRN DAILY  PRN MC SEE 

COMMENTS;  Start 4/7/20 at 10:45;  Status UNV


Info (PHARMACY MONITORING -- do not chart) 1 each PRN DAILY  PRN MC SEE 

COMMENTS;  Start 4/7/20 at 10:45;  Status UNV


Sodium Chloride 90 meq/Potassium Chloride 15 meq/ Magnesium Sulfate 12 

meq/Calcium Gluconate 15 meq/ Multivitamins 10 ml/Chromium/ Copper/Manganese/ 

Seleni/Zn 0.5 ml/ Insulin Human Regular 25 unit/ Total Parenteral 

Nutrition/Amino Acids/Dextrose/ Fat Emulsion Intravenous 1,400 ml @  58.333 mls/

hr TPN  CONT IV  Last administered on 4/7/20at 22:13;  Start 4/7/20 at 22:00;  

Stop 4/8/20 at 21:59;  Status DC


Sodium Chloride 1,000 ml @  1,000 mls/hr Q1H PRN IV hypotension;  Start 4/8/20 

at 07:50;  Stop 4/8/20 at 13:49;  Status DC


Albumin Human 200 ml @  200 mls/hr 1X  ONCE IV ;  Start 4/8/20 at 08:00;  Stop 

4/8/20 at 08:53;  Status DC


Diphenhydramine HCl (Benadryl) 25 mg 1X PRN  PRN IV ITCHING;  Start 4/8/20 at 

08:00;  Stop 4/9/20 at 07:59;  Status DC


Diphenhydramine HCl (Benadryl) 25 mg 1X PRN  PRN IV ITCHING;  Start 4/8/20 at 

08:00;  Stop 4/9/20 at 07:59;  Status DC


Info (PHARMACY MONITORING -- do not chart) 1 each PRN DAILY  PRN MC SEE 

COMMENTS;  Start 4/8/20 at 08:00;  Stop 4/9/20 at 08:16;  Status DC


Albumin Human 50 ml @ 50 mls/hr 1X  ONCE IV ;  Start 4/8/20 at 08:53;  Stop 

4/8/20 at 08:56;  Status DC


Albumin Human 200 ml @  50 mls/hr PRN 1X  PRN IV HYPOTENSION Last administered 

on 4/14/20at 11:54;  Start 4/8/20 at 09:00


Meropenem 500 mg/ Sodium Chloride 50 ml @  100 mls/hr Q12H IV  Last administered

on 4/17/20at 22:09;  Start 4/8/20 at 10:00


Sodium Chloride 90 meq/Magnesium Sulfate 12 meq/ Calcium Gluconate 15 meq/ 

Multivitamins 10 ml/Chromium/ Copper/Manganese/ Seleni/Zn 0.5 ml/ Insulin Human 

Regular 25 unit/ Total Parenteral Nutrition/Amino Acids/Dextrose/ Fat Emulsion 

Intravenous 1,400 ml @  58.333 mls/ hr TPN  CONT IV  Last administered on 

4/8/20at 21:41;  Start 4/8/20 at 22:00;  Stop 4/9/20 at 21:59;  Status DC


Sodium Chloride 1,000 ml @  1,000 mls/hr Q1H PRN IV hypotension;  Start 4/9/20 

at 07:58;  Stop 4/9/20 at 13:57;  Status DC


Albumin Human 200 ml @  200 mls/hr 1X PRN  PRN IV Hypotension Last administered 

on 4/9/20at 09:30;  Start 4/9/20 at 08:00;  Stop 4/9/20 at 13:59;  Status DC


Sodium Chloride 1,000 ml @  400 mls/hr Q2H30M PRN IV PATENCY;  Start 4/9/20 at 

07:58;  Stop 4/9/20 at 19:57;  Status DC


Info (PHARMACY MONITORING -- do not chart) 1 each PRN DAILY  PRN MC SEE 

COMMENTS;  Start 4/9/20 at 08:00;  Status Cancel


Info (PHARMACY MONITORING -- do not chart) 1 each PRN DAILY  PRN MC SEE 

COMMENTS;  Start 4/9/20 at 08:15;  Status UNV


Sodium Chloride 90 meq/Potassium Phosphate 5 mmol/ Magnesium Sulfate 12 

meq/Calcium Gluconate 15 meq/ Multivitamins 10 ml/Chromium/ Copper/Manganese/ 

Seleni/Zn 0.5 ml/ Insulin Human Regular 30 unit/ Total Parenteral 

Nutrition/Amino Acids/Dextrose/ Fat Emulsion Intravenous 1,400 ml @  58.333 mls/

hr TPN  CONT IV  Last administered on 4/9/20at 22:08;  Start 4/9/20 at 22:00;  

Stop 4/10/20 at 21:59;  Status DC


Linezolid/Dextrose 300 ml @  300 mls/hr Q12HR IV  Last administered on 4/17/20at

20:46;  Start 4/10/20 at 11:00


Sodium Chloride 90 meq/Potassium Phosphate 15 mmol/ Magnesium Sulfate 12 

meq/Calcium Gluconate 15 meq/ Multivitamins 10 ml/Chromium/ Copper/Manganese/ 

Seleni/Zn 0.5 ml/ Insulin Human Regular 30 unit/ Total Parenteral 

Nutrition/Amino Acids/Dextrose/ Fat Emulsion Intravenous 1,400 ml @  58.333 mls/

hr TPN  CONT IV  Last administered on 4/10/20at 21:49;  Start 4/10/20 at 22:00; 

Stop 4/11/20 at 21:59;  Status DC


Sodium Chloride 90 meq/Potassium Phosphate 15 mmol/ Magnesium Sulfate 12 

meq/Calcium Gluconate 15 meq/ Multivitamins 10 ml/Chromium/ Copper/Manganese/ 

Seleni/Zn 0.5 ml/ Insulin Human Regular 40 unit/ Total Parenteral 

Nutrition/Amino Acids/Dextrose/ Fat Emulsion Intravenous 1,400 ml @  58.333 mls/

hr TPN  CONT IV  Last administered on 4/11/20at 21:21;  Start 4/11/20 at 22:00; 

Stop 4/12/20 at 21:59;  Status DC


Sodium Chloride 1,000 ml @  1,000 mls/hr Q1H PRN IV hypotension;  Start 4/11/20 

at 13:26;  Stop 4/11/20 at 19:25;  Status DC


Albumin Human 200 ml @  200 mls/hr 1X PRN  PRN IV Hypotension Last administered 

on 4/11/20at 15:00;  Start 4/11/20 at 13:30;  Stop 4/11/20 at 19:29;  Status DC


Sodium Chloride (Normal Saline Flush) 10 ml 1X PRN  PRN IV AP catheter pack;  

Start 4/11/20 at 13:30;  Stop 4/12/20 at 13:29;  Status DC


Sodium Chloride (Normal Saline Flush) 10 ml 1X PRN  PRN IV  catheter pack;  

Start 4/11/20 at 13:30;  Stop 4/12/20 at 13:29;  Status DC


Sodium Chloride 1,000 ml @  400 mls/hr Q2H30M PRN IV PATENCY;  Start 4/11/20 at 

13:26;  Stop 4/12/20 at 01:25;  Status DC


Info (PHARMACY MONITORING -- do not chart) 1 each PRN DAILY  PRN MC SEE CO

MMENTS;  Start 4/11/20 at 13:30;  Stop 4/11/20 at 13:33;  Status DC


Info (PHARMACY MONITORING -- do not chart) 1 each PRN DAILY  PRN MC SEE 

COMMENTS;  Start 4/11/20 at 13:30;  Stop 4/11/20 at 13:34;  Status DC


Sodium Chloride 90 meq/Potassium Phosphate 19 mmol/ Magnesium Sulfate 12 

meq/Calcium Gluconate 15 meq/ Multivitamins 10 ml/Chromium/ Copper/Manganese/ 

Seleni/Zn 0.5 ml/ Insulin Human Regular 40 unit/ Total Parenteral 

Nutrition/Amino Acids/Dextrose/ Fat Emulsion Intravenous 1,400 ml @  58.333 mls/

hr TPN  CONT IV  Last administered on 4/12/20at 21:54;  Start 4/12/20 at 22:00; 

Stop 4/13/20 at 21:59;  Status DC


Sodium Chloride 1,000 ml @  1,000 mls/hr Q1H PRN IV hypotension;  Start 4/13/20 

at 09:35;  Stop 4/13/20 at 15:34;  Status DC


Albumin Human 200 ml @  200 mls/hr 1X PRN  PRN IV Hypotension;  Start 4/13/20 at

09:45;  Stop 4/13/20 at 15:44;  Status DC


Diphenhydramine HCl (Benadryl) 25 mg 1X PRN  PRN IV ITCHING;  Start 4/13/20 at 

09:45;  Stop 4/14/20 at 09:44;  Status DC


Diphenhydramine HCl (Benadryl) 25 mg 1X PRN  PRN IV ITCHING;  Start 4/13/20 at 

09:45;  Stop 4/14/20 at 09:44;  Status DC


Sodium Chloride 1,000 ml @  400 mls/hr Q2H30M PRN IV PATENCY;  Start 4/13/20 at 

09:35;  Stop 4/13/20 at 21:34;  Status DC


Info (PHARMACY MONITORING -- do not chart) 1 each PRN DAILY  PRN MC SEE 

COMMENTS;  Start 4/13/20 at 09:45;  Status Cancel


Sodium Chloride 100 meq/Potassium Phosphate 19 mmol/ Magnesium Sulfate 12 

meq/Calcium Gluconate 15 meq/ Multivitamins 10 ml/Chromium/ Copper/Manganese/ 

Seleni/Zn 0.5 ml/ Insulin Human Regular 40 unit/ Potassium Chloride 20 meq/ 

Total Parenteral Nutrition/Amino Acids/Dextrose/ Fat Emulsion Intravenous 1,400 

ml @  58.333 mls/ hr TPN  CONT IV  Last administered on 4/13/20at 22:02;  Start 

4/13/20 at 22:00;  Stop 4/14/20 at 21:59;  Status DC


Furosemide (Lasix) 40 mg 1X  ONCE IVP  Last administered on 4/13/20at 14:39;  

Start 4/13/20 at 14:30;  Stop 4/13/20 at 14:31;  Status DC


Metronidazole 100 ml @  100 mls/hr Q8HRS IV  Last administered on 4/18/20at 

06:21;  Start 4/14/20 at 10:00


Sodium Chloride 1,000 ml @  1,000 mls/hr Q1H PRN IV hypotension;  Start 4/14/20 

at 08:00;  Stop 4/14/20 at 13:59;  Status DC


Albumin Human 200 ml @  200 mls/hr 1X PRN  PRN IV Hypotension;  Start 4/14/20 at

08:00;  Stop 4/14/20 at 13:59;  Status DC


Sodium Chloride 1,000 ml @  400 mls/hr Q2H30M PRN IV PATENCY;  Start 4/14/20 at 

08:00;  Stop 4/14/20 at 19:59;  Status DC


Info (PHARMACY MONITORING -- do not chart) 1 each PRN DAILY  PRN MC SEE 

COMMENTS;  Start 4/14/20 at 11:30;  Status UNV


Info (PHARMACY MONITORING -- do not chart) 1 each PRN DAILY  PRN MC SEE 

COMMENTS;  Start 4/14/20 at 11:30;  Stop 4/16/20 at 12:13;  Status DC


Sodium Chloride 100 meq/Potassium Phosphate 19 mmol/ Magnesium Sulfate 12 

meq/Calcium Gluconate 15 meq/ Multivitamins 10 ml/Chromium/ Copper/Manganese/ 

Seleni/Zn 0.5 ml/ Insulin Human Regular 40 unit/ Potassium Chloride 20 meq/ 

Total Parenteral Nutrition/Amino Acids/Dextrose/ Fat Emulsion Intravenous 1,400 

ml @  58.333 mls/ hr TPN  CONT IV  Last administered on 4/14/20at 21:52;  Start 

4/14/20 at 22:00;  Stop 4/15/20 at 21:59;  Status DC


Sodium Chloride (Normal Saline Flush) 10 ml QSHIFT  PRN IV AFTER MEDS AND BLOOD 

DRAWS;  Start 4/14/20 at 15:00


Sodium Chloride (Normal Saline Flush) 10 ml PRN Q5MIN  PRN IV AFTER MEDS AND 

BLOOD DRAWS;  Start 4/14/20 at 15:00


Sodium Chloride (Normal Saline Flush) 20 ml PRN Q5MIN  PRN IV AFTER MEDS AND 

BLOOD DRAWS;  Start 4/14/20 at 15:00


Sodium Chloride 100 meq/Potassium Phosphate 19 mmol/ Magnesium Sulfate 12 

meq/Calcium Gluconate 15 meq/ Multivitamins 10 ml/Chromium/ Copper/Manganese/ 

Seleni/Zn 0.5 ml/ Insulin Human Regular 40 unit/ Potassium Chloride 20 meq/ 

Total Parenteral Nutrition/Amino Acids/Dextrose/ Fat Emulsion Intravenous 1,400 

ml @  58.333 mls/ hr TPN  CONT IV  Last administered on 4/15/20at 21:20;  Start 

4/15/20 at 22:00;  Stop 4/16/20 at 21:59;  Status DC


Lidocaine HCl (Buffered Lidocaine 1%) 3 ml STK-MED ONCE .ROUTE ;  Start 4/15/20 

at 13:16;  Stop 4/15/20 at 13:16;  Status DC


Lidocaine HCl (Buffered Lidocaine 1%) 6 ml 1X  ONCE INJ  Last administered on 

4/15/20at 13:45;  Start 4/15/20 at 13:30;  Stop 4/15/20 at 13:31;  Status DC


Albumin Human 100 ml @  100 mls/hr 1X  ONCE IV  Last administered on 4/15/20at 

15:41;  Start 4/15/20 at 15:00;  Stop 4/15/20 at 15:59;  Status DC


Albumin Human 50 ml @ 50 mls/hr 1X  ONCE IV  Last administered on 4/15/20at 

15:00;  Start 4/15/20 at 15:00;  Stop 4/15/20 at 15:59;  Status DC


Info (PHARMACY MONITORING -- do not chart) 1 each PRN DAILY  PRN MC SEE 

COMMENTS;  Start 4/16/20 at 11:30


Info (PHARMACY MONITORING -- do not chart) 1 each PRN DAILY  PRN MC SEE 

COMMENTS;  Start 4/16/20 at 11:30;  Status UNV


Sodium Chloride 100 meq/Potassium Phosphate 10 mmol/ Magnesium Sulfate 12 

meq/Calcium Gluconate 15 meq/ Multivitamins 10 ml/Chromium/ Copper/Manganese/ 

Seleni/Zn 0.5 ml/ Insulin Human Regular 35 unit/ Potassium Chloride 20 meq/ 

Total Parenteral Nutrition/Amino Acids/Dextrose/ Fat Emulsion Intravenous 1,400 

ml @  58.333 mls/ hr TPN  CONT IV  Last administered on 4/16/20at 22:10;  Start 

4/16/20 at 22:00;  Stop 4/17/20 at 21:59;  Status DC


Sodium Chloride 100 meq/Potassium Phosphate 5 mmol/ Magnesium Sulfate 12 

meq/Calcium Gluconate 15 meq/ Multivitamins 10 ml/Chromium/ Copper/Manganese/ 

Seleni/Zn 0.5 ml/ Insulin Human Regular 35 unit/ Potassium Chloride 20 meq/ 

Total Parenteral Nutrition/Amino Acids/Dextrose/ Fat Emulsion Intravenous 1,400 

ml @  58.333 mls/ hr TPN  CONT IV  Last administered on 4/17/20at 22:59;  Start 

4/17/20 at 22:00;  Stop 4/18/20 at 21:59





Active Scripts


Active


Reported


Bisoprolol Fumarate 5 Mg Tablet 10 Mg PO DAILY


Vitals/I & O





Vital Sign - Last 24 Hours








 4/17/20 4/17/20 4/17/20 4/17/20





 08:00 08:00 09:00 10:00


 


Temp  100.6  





  100.6  


 


Pulse  92 94 106


 


Resp  18 18 29


 


B/P (MAP)  130/65 (86) 130/79 (96) 188/108 (134)


 


Pulse Ox  99 100 100


 


O2 Delivery Mechanical Ventilator Ventilator Ventilator C-pap trial


 


    





    





 4/17/20 4/17/20 4/17/20 4/17/20





 10:51 11:00 12:00 12:00


 


Temp   99.6 





   99.6 


 


Pulse  120 108 


 


Resp  30 29 


 


B/P (MAP)  202/111 (141) 149/82 (104) 


 


Pulse Ox 100 100 99 


 


O2 Delivery Ventilator C-pap trial C-pap trial Mechanical Ventilator


 


    





    





 4/17/20 4/17/20 4/17/20 4/17/20





 12:55 13:00 14:00 15:00


 


Pulse  144 120 108


 


Resp  32 32 27


 


B/P (MAP)  133/90 (104) 158/101 (120) 185/102 (129)


 


Pulse Ox 95 100 99 99


 


O2 Delivery Ventilator C-pap trial C-pap trial C-pap trial





 4/17/20 4/17/20 4/17/20 4/17/20





 15:41 16:00 16:00 17:00


 


Temp  99.2  





  99.2  


 


Pulse  126  108


 


Resp  39  27


 


B/P (MAP)  200/109 (139)  159/84 (109)


 


Pulse Ox 100 100  99


 


O2 Delivery Ventilator C-pap trial Mechanical Ventilator C-pap trial


 


    





    





 4/17/20 4/17/20 4/17/20 4/17/20





 18:00 18:40 19:00 20:00


 


Pulse 110  92 


 


Resp 32  18 


 


B/P (MAP) 175/106 (129)  162/92 (115) 


 


Pulse Ox 99 100 100 


 


O2 Delivery C-pap trial Ventilator Ventilator Mechanical Ventilator





 4/17/20 4/17/20 4/17/20 4/17/20





 20:00 20:10 20:50 21:00


 


Temp 99.8   





 99.8   


 


Pulse 117   128


 


Resp 24   30


 


B/P (MAP) 194/108 (136)   181/91 (121)


 


Pulse Ox 100 100 100 100


 


O2 Delivery Ventilator Ventilator  Ventilator


 


    





    





 4/17/20 4/17/20 4/18/20 4/18/20





 22:00 23:00 00:00 00:00


 


Temp   98.9 





   98.9 


 


Pulse 111 91 82 


 


Resp 20 19 20 


 


B/P (MAP) 173/92 (119) 139/65 (89) 130/63 (85) 


 


Pulse Ox 100 100 100 


 


O2 Delivery Ventilator Ventilator Ventilator Mechanical Ventilator


 


    





    





 4/18/20 4/18/20 4/18/20 4/18/20





 00:30 01:00 02:00 03:00


 


Pulse  86 114 105


 


Resp  18 18 28


 


B/P (MAP)  133/69 (90) 144/81 (102) 153/91 (111)


 


Pulse Ox 100 100 100 100


 


O2 Delivery Ventilator Ventilator Ventilator Ventilator





 4/18/20 4/18/20 4/18/20 4/18/20





 04:00 04:00 04:15 05:00


 


Temp 99.5   





 99.5   


 


Pulse 102   88


 


Resp 28   22


 


B/P (MAP) 160/81 (107)   170/96 (120)


 


Pulse Ox 100  100 100


 


O2 Delivery Ventilator Mechanical Ventilator Ventilator Ventilator


 


    





    





 4/18/20   





 06:00   


 


Pulse 90   


 


Resp 18   


 


B/P (MAP) 136/71 (92)   


 


Pulse Ox 100   


 


O2 Delivery Ventilator   














Intake and Output   


 


 4/17/20 4/17/20 4/18/20





 15:00 23:00 07:00


 


Intake Total 450 ml 2344.2 ml 1313.96 ml


 


Output Total 470 ml 1250 ml 825 ml


 


Balance -20 ml 1094.2 ml 488.96 ml











Hemodynamically unstable?:  No


Is patient in severe pain?:  No


Is NPO status required?:  Yes











GAETANO GONCALVES MD        Apr 18, 2020 07:53

## 2020-04-18 NOTE — PDOC
SURGICAL PROGRESS NOTE


Subjective


seen during dialysis 


awake


Vital Signs





Vital Signs








  Date Time  Temp Pulse Resp B/P (MAP) Pulse Ox O2 Delivery O2 Flow Rate FiO2


 


4/18/20 08:02     100 Ventilator  


 


4/18/20 06:00  90 18 136/71 (92)    


 


4/18/20 04:00 99.5       





 99.5       








I&O











Intake and Output 


 


 4/18/20





 07:00


 


Intake Total 4108.16 ml


 


Output Total 2605 ml


 


Balance 1503.16 ml


 


 


 


IV Total 4108.16 ml


 


Output Urine Total 2205 ml


 


Gastric Drainage Total 400 ml








General:  Cooperative, No acute distress


HEENT:  Other (trach intact)


Abdomen:  Soft, Other (distended)


Labs





Laboratory Tests








Test


 4/16/20


19:31 4/16/20


23:53 4/17/20


06:00 4/17/20


06:06


 


Glucose (Fingerstick)


 142 mg/dL


(70-99) 145 mg/dL


(70-99) 


 129 mg/dL


(70-99)


 


Sodium Level


 


 


 140 mmol/L


(136-145) 





 


Potassium Level


 


 


 3.7 mmol/L


(3.5-5.1) 





 


Chloride Level


 


 


 103 mmol/L


() 





 


Carbon Dioxide Level


 


 


 26 mmol/L


(21-32) 





 


Anion Gap   11 (6-14)  


 


Blood Urea Nitrogen


 


 


 80 mg/dL


(7-20) 





 


Creatinine


 


 


 1.6 mg/dL


(0.6-1.0) 





 


Estimated GFR


(Cockcroft-Gault) 


 


 34.3 


 





 


BUN/Creatinine Ratio   50 (6-20)  


 


Glucose Level


 


 


 130 mg/dL


(70-99) 





 


Calcium Level


 


 


 8.4 mg/dL


(8.5-10.1) 





 


Phosphorus Level


 


 


 5.2 mg/dL


(2.6-4.7) 





 


Total Bilirubin


 


 


 0.4 mg/dL


(0.2-1.0) 





 


Aspartate Amino Transf


(AST/SGOT) 


 


 23 U/L (15-37) 


 





 


Alanine Aminotransferase


(ALT/SGPT) 


 


 12 U/L (14-59) 


 





 


Alkaline Phosphatase


 


 


 52 U/L


() 





 


Total Protein


 


 


 4.9 g/dL


(6.4-8.2) 





 


Albumin


 


 


 2.6 g/dL


(3.4-5.0) 





 


Albumin/Globulin Ratio   1.1 (1.0-1.7)  


 


Test


 4/17/20


07:00 4/17/20


08:00 4/17/20


13:08 4/17/20


17:06


 


White Blood Count


 5.8 x10^3/uL


(4.0-11.0) 


 


 





 


Red Blood Count


 2.40 x10^6/uL


(3.50-5.40) 


 


 





 


Hemoglobin


 7.6 g/dL


(12.0-15.5) 


 


 





 


Hematocrit


 22.8 %


(36.0-47.0) 


 


 





 


Mean Corpuscular Volume 95 fL ()    


 


Mean Corpuscular Hemoglobin 32 pg (25-35)    


 


Mean Corpuscular Hemoglobin


Concent 33 g/dL


(31-37) 


 


 





 


Red Cell Distribution Width


 18.4 %


(11.5-14.5) 


 


 





 


Platelet Count


 498 x10^3/uL


(140-400) 


 


 





 


Neutrophils (%) (Auto) 67 % (31-73)    


 


Lymphocytes (%) (Auto) 17 % (24-48)    


 


Monocytes (%) (Auto) 14 % (0-9)    


 


Eosinophils (%) (Auto) 1 % (0-3)    


 


Basophils (%) (Auto) 1 % (0-3)    


 


Neutrophils # (Auto)


 3.9 x10^3/uL


(1.8-7.7) 


 


 





 


Lymphocytes # (Auto)


 1.0 x10^3/uL


(1.0-4.8) 


 


 





 


Monocytes # (Auto)


 0.8 x10^3/uL


(0.0-1.1) 


 


 





 


Eosinophils # (Auto)


 0.0 x10^3/uL


(0.0-0.7) 


 


 





 


Basophils # (Auto)


 0.0 x10^3/uL


(0.0-0.2) 


 


 





 


O2 Saturation  98 % (92-99)   


 


Arterial Blood pH


 


 7.47


(7.35-7.45) 


 





 


Arterial Blood pCO2 at


Patient Temp 


 33 mmHg


(35-46) 


 





 


Arterial Blood pO2 at Patient


Temp 


 136 mmHg


() 


 





 


Arterial Blood HCO3


 


 24 mmol/L


(21-28) 


 





 


Arterial Blood Base Excess


 


 0 mmol/L


(-3-3) 


 





 


FiO2  35   


 


Glucose (Fingerstick)


 


 


 181 mg/dL


(70-99) 146 mg/dL


(70-99)


 


Test


 4/18/20


06:10 4/18/20


07:17 


 





 


White Blood Count


 6.3 x10^3/uL


(4.0-11.0) 


 


 





 


Red Blood Count


 2.23 x10^6/uL


(3.50-5.40) 


 


 





 


Hemoglobin


 7.1 g/dL


(12.0-15.5) 


 


 





 


Hematocrit


 21.1 %


(36.0-47.0) 


 


 





 


Mean Corpuscular Volume 95 fL ()    


 


Mean Corpuscular Hemoglobin 32 pg (25-35)    


 


Mean Corpuscular Hemoglobin


Concent 33 g/dL


(31-37) 


 


 





 


Red Cell Distribution Width


 18.1 %


(11.5-14.5) 


 


 





 


Platelet Count


 539 x10^3/uL


(140-400) 


 


 





 


Neutrophils (%) (Auto) 74 % (31-73)    


 


Lymphocytes (%) (Auto) 12 % (24-48)    


 


Monocytes (%) (Auto) 14 % (0-9)    


 


Eosinophils (%) (Auto) 0 % (0-3)    


 


Basophils (%) (Auto) 0 % (0-3)    


 


Neutrophils # (Auto)


 4.7 x10^3/uL


(1.8-7.7) 


 


 





 


Lymphocytes # (Auto)


 0.7 x10^3/uL


(1.0-4.8) 


 


 





 


Monocytes # (Auto)


 0.9 x10^3/uL


(0.0-1.1) 


 


 





 


Eosinophils # (Auto)


 0.0 x10^3/uL


(0.0-0.7) 


 


 





 


Basophils # (Auto)


 0.0 x10^3/uL


(0.0-0.2) 


 


 





 


Sodium Level


 141 mmol/L


(136-145) 


 


 





 


Potassium Level


 3.3 mmol/L


(3.5-5.1) 


 


 





 


Chloride Level


 103 mmol/L


() 


 


 





 


Carbon Dioxide Level


 25 mmol/L


(21-32) 


 


 





 


Anion Gap 13 (6-14)    


 


Blood Urea Nitrogen


 92 mg/dL


(7-20) 


 


 





 


Creatinine


 1.9 mg/dL


(0.6-1.0) 


 


 





 


Estimated GFR


(Cockcroft-Gault) 28.1 


 


 


 





 


BUN/Creatinine Ratio 48 (6-20)    


 


Glucose Level


 152 mg/dL


(70-99) 


 


 





 


Calcium Level


 8.6 mg/dL


(8.5-10.1) 


 


 





 


Phosphorus Level


 5.2 mg/dL


(2.6-4.7) 


 


 





 


Magnesium Level


 1.7 mg/dL


(1.8-2.4) 


 


 





 


Total Bilirubin


 0.4 mg/dL


(0.2-1.0) 


 


 





 


Aspartate Amino Transf


(AST/SGOT) 23 U/L (15-37) 


 


 


 





 


Alanine Aminotransferase


(ALT/SGPT) 11 U/L (14-59) 


 


 


 





 


Alkaline Phosphatase


 49 U/L


() 


 


 





 


Total Protein


 5.1 g/dL


(6.4-8.2) 


 


 





 


Albumin


 2.6 g/dL


(3.4-5.0) 


 


 





 


Albumin/Globulin Ratio 1.0 (1.0-1.7)    


 


Glucose (Fingerstick)


 


 137 mg/dL


(70-99) 


 











Laboratory Tests








Test


 4/17/20


13:08 4/17/20


17:06 4/18/20


06:10 4/18/20


07:17


 


Glucose (Fingerstick)


 181 mg/dL


(70-99) 146 mg/dL


(70-99) 


 137 mg/dL


(70-99)


 


White Blood Count


 


 


 6.3 x10^3/uL


(4.0-11.0) 





 


Red Blood Count


 


 


 2.23 x10^6/uL


(3.50-5.40) 





 


Hemoglobin


 


 


 7.1 g/dL


(12.0-15.5) 





 


Hematocrit


 


 


 21.1 %


(36.0-47.0) 





 


Mean Corpuscular Volume   95 fL ()  


 


Mean Corpuscular Hemoglobin   32 pg (25-35)  


 


Mean Corpuscular Hemoglobin


Concent 


 


 33 g/dL


(31-37) 





 


Red Cell Distribution Width


 


 


 18.1 %


(11.5-14.5) 





 


Platelet Count


 


 


 539 x10^3/uL


(140-400) 





 


Neutrophils (%) (Auto)   74 % (31-73)  


 


Lymphocytes (%) (Auto)   12 % (24-48)  


 


Monocytes (%) (Auto)   14 % (0-9)  


 


Eosinophils (%) (Auto)   0 % (0-3)  


 


Basophils (%) (Auto)   0 % (0-3)  


 


Neutrophils # (Auto)


 


 


 4.7 x10^3/uL


(1.8-7.7) 





 


Lymphocytes # (Auto)


 


 


 0.7 x10^3/uL


(1.0-4.8) 





 


Monocytes # (Auto)


 


 


 0.9 x10^3/uL


(0.0-1.1) 





 


Eosinophils # (Auto)


 


 


 0.0 x10^3/uL


(0.0-0.7) 





 


Basophils # (Auto)


 


 


 0.0 x10^3/uL


(0.0-0.2) 





 


Sodium Level


 


 


 141 mmol/L


(136-145) 





 


Potassium Level


 


 


 3.3 mmol/L


(3.5-5.1) 





 


Chloride Level


 


 


 103 mmol/L


() 





 


Carbon Dioxide Level


 


 


 25 mmol/L


(21-32) 





 


Anion Gap   13 (6-14)  


 


Blood Urea Nitrogen


 


 


 92 mg/dL


(7-20) 





 


Creatinine


 


 


 1.9 mg/dL


(0.6-1.0) 





 


Estimated GFR


(Cockcroft-Gault) 


 


 28.1 


 





 


BUN/Creatinine Ratio   48 (6-20)  


 


Glucose Level


 


 


 152 mg/dL


(70-99) 





 


Calcium Level


 


 


 8.6 mg/dL


(8.5-10.1) 





 


Phosphorus Level


 


 


 5.2 mg/dL


(2.6-4.7) 





 


Magnesium Level


 


 


 1.7 mg/dL


(1.8-2.4) 





 


Total Bilirubin


 


 


 0.4 mg/dL


(0.2-1.0) 





 


Aspartate Amino Transf


(AST/SGOT) 


 


 23 U/L (15-37) 


 





 


Alanine Aminotransferase


(ALT/SGPT) 


 


 11 U/L (14-59) 


 





 


Alkaline Phosphatase


 


 


 49 U/L


() 





 


Total Protein


 


 


 5.1 g/dL


(6.4-8.2) 





 


Albumin


 


 


 2.6 g/dL


(3.4-5.0) 





 


Albumin/Globulin Ratio   1.0 (1.0-1.7)  








Problem List


Problems


Medical Problems:


(1) Acute pancreatitis


Status: Acute  





(2) Cholelithiasis


Status: Acute  








Assessment/Plan


stable 


no surgical plans


will FU on monday











SAURAV NASH            Apr 18, 2020 11:14

## 2020-04-18 NOTE — PDOC
PULMONARY PROGRESS NOTES


Subjective


Patient intubated on 3/23 , s/p trach 4/6, on  AC mode, alert follow  commands, 

on precedex. small ett secretion, tolerated ps 8 hrs yesterday


S/P paracentisis with 4 liters removed on 4/15/20


Vitals





Vital Signs








  Date Time  Temp Pulse Resp B/P (MAP) Pulse Ox O2 Delivery O2 Flow Rate FiO2


 


4/18/20 06:00  90 18 136/71 (92) 100 Ventilator  


 


4/18/20 04:00 99.5       





 99.5       








ROS:  No Nausea, No Chest Pain, No Abdominal Pain, No Increase Cough


General:  Alert


Lungs:  Other (dimished in BLL)


Cardiovascular:  S1, S2


Abdomen:  Non-tender, Other (distended)


Extremities:  Other (+3 generalized edema )


Skin:  Warm, Dry


Labs





Laboratory Tests








Test


 4/16/20


08:00 4/16/20


10:00 4/16/20


19:31 4/16/20


23:53


 


O2 Saturation 97 % (92-99)    


 


Arterial Blood pH


 7.45


(7.35-7.45) 


 


 





 


Arterial Blood pH (Temp


corrected) 7.43 


 


 


 





 


Arterial Blood pCO2 at


Patient Temp 33 mmHg


(35-46) 


 


 





 


Arterial Blood pCO2 (Temp


correct) 35 mmHg 


 


 


 





 


Arterial Blood pO2 at Patient


Temp 103 mmHg


() 


 


 





 


Arterial Blood pO2 (Temp


corrected) 114 mmHg 


 


 


 





 


Arterial Blood HCO3


 22 mmol/L


(21-28) 


 


 





 


Arterial Blood Base Excess


 -1 mmol/L


(-3-3) 


 


 





 


FiO2 35% vent    


 


White Blood Count


 


 9.2 x10^3/uL


(4.0-11.0) 


 





 


Red Blood Count


 


 2.76 x10^6/uL


(3.50-5.40) 


 





 


Hemoglobin


 


 8.9 g/dL


(12.0-15.5) 


 





 


Hematocrit


 


 26.5 %


(36.0-47.0) 


 





 


Mean Corpuscular Volume  96 fL ()   


 


Mean Corpuscular Hemoglobin  32 pg (25-35)   


 


Mean Corpuscular Hemoglobin


Concent 


 33 g/dL


(31-37) 


 





 


Red Cell Distribution Width


 


 18.9 %


(11.5-14.5) 


 





 


Platelet Count


 


 530 x10^3/uL


(140-400) 


 





 


Neutrophils (%) (Auto)  66 % (31-73)   


 


Lymphocytes (%) (Auto)  21 % (24-48)   


 


Monocytes (%) (Auto)  12 % (0-9)   


 


Eosinophils (%) (Auto)  0 % (0-3)   


 


Basophils (%) (Auto)  1 % (0-3)   


 


Neutrophils # (Auto)


 


 6.1 x10^3/uL


(1.8-7.7) 


 





 


Lymphocytes # (Auto)


 


 2.0 x10^3/uL


(1.0-4.8) 


 





 


Monocytes # (Auto)


 


 1.1 x10^3/uL


(0.0-1.1) 


 





 


Eosinophils # (Auto)


 


 0.0 x10^3/uL


(0.0-0.7) 


 





 


Basophils # (Auto)


 


 0.0 x10^3/uL


(0.0-0.2) 


 





 


Glucose (Fingerstick)


 


 


 142 mg/dL


(70-99) 145 mg/dL


(70-99)


 


Test


 4/17/20


06:00 4/17/20


06:06 4/17/20


07:00 4/17/20


08:00


 


Sodium Level


 140 mmol/L


(136-145) 


 


 





 


Potassium Level


 3.7 mmol/L


(3.5-5.1) 


 


 





 


Chloride Level


 103 mmol/L


() 


 


 





 


Carbon Dioxide Level


 26 mmol/L


(21-32) 


 


 





 


Anion Gap 11 (6-14)    


 


Blood Urea Nitrogen


 80 mg/dL


(7-20) 


 


 





 


Creatinine


 1.6 mg/dL


(0.6-1.0) 


 


 





 


Estimated GFR


(Cockcroft-Gault) 34.3 


 


 


 





 


BUN/Creatinine Ratio 50 (6-20)    


 


Glucose Level


 130 mg/dL


(70-99) 


 


 





 


Calcium Level


 8.4 mg/dL


(8.5-10.1) 


 


 





 


Phosphorus Level


 5.2 mg/dL


(2.6-4.7) 


 


 





 


Total Bilirubin


 0.4 mg/dL


(0.2-1.0) 


 


 





 


Aspartate Amino Transf


(AST/SGOT) 23 U/L (15-37) 


 


 


 





 


Alanine Aminotransferase


(ALT/SGPT) 12 U/L (14-59) 


 


 


 





 


Alkaline Phosphatase


 52 U/L


() 


 


 





 


Total Protein


 4.9 g/dL


(6.4-8.2) 


 


 





 


Albumin


 2.6 g/dL


(3.4-5.0) 


 


 





 


Albumin/Globulin Ratio 1.1 (1.0-1.7)    


 


Glucose (Fingerstick)


 


 129 mg/dL


(70-99) 


 





 


White Blood Count


 


 


 5.8 x10^3/uL


(4.0-11.0) 





 


Red Blood Count


 


 


 2.40 x10^6/uL


(3.50-5.40) 





 


Hemoglobin


 


 


 7.6 g/dL


(12.0-15.5) 





 


Hematocrit


 


 


 22.8 %


(36.0-47.0) 





 


Mean Corpuscular Volume   95 fL ()  


 


Mean Corpuscular Hemoglobin   32 pg (25-35)  


 


Mean Corpuscular Hemoglobin


Concent 


 


 33 g/dL


(31-37) 





 


Red Cell Distribution Width


 


 


 18.4 %


(11.5-14.5) 





 


Platelet Count


 


 


 498 x10^3/uL


(140-400) 





 


Neutrophils (%) (Auto)   67 % (31-73)  


 


Lymphocytes (%) (Auto)   17 % (24-48)  


 


Monocytes (%) (Auto)   14 % (0-9)  


 


Eosinophils (%) (Auto)   1 % (0-3)  


 


Basophils (%) (Auto)   1 % (0-3)  


 


Neutrophils # (Auto)


 


 


 3.9 x10^3/uL


(1.8-7.7) 





 


Lymphocytes # (Auto)


 


 


 1.0 x10^3/uL


(1.0-4.8) 





 


Monocytes # (Auto)


 


 


 0.8 x10^3/uL


(0.0-1.1) 





 


Eosinophils # (Auto)


 


 


 0.0 x10^3/uL


(0.0-0.7) 





 


Basophils # (Auto)


 


 


 0.0 x10^3/uL


(0.0-0.2) 





 


O2 Saturation    98 % (92-99) 


 


Arterial Blood pH


 


 


 


 7.47


(7.35-7.45)


 


Arterial Blood pCO2 at


Patient Temp 


 


 


 33 mmHg


(35-46)


 


Arterial Blood pO2 at Patient


Temp 


 


 


 136 mmHg


()


 


Arterial Blood HCO3


 


 


 


 24 mmol/L


(21-28)


 


Arterial Blood Base Excess


 


 


 


 0 mmol/L


(-3-3)


 


FiO2    35 


 


Test


 4/17/20


13:08 4/17/20


17:06 4/18/20


06:10 





 


Glucose (Fingerstick)


 181 mg/dL


(70-99) 146 mg/dL


(70-99) 


 





 


Sodium Level


 


 


 141 mmol/L


(136-145) 





 


Potassium Level


 


 


 3.3 mmol/L


(3.5-5.1) 





 


Chloride Level


 


 


 103 mmol/L


() 





 


Carbon Dioxide Level


 


 


 25 mmol/L


(21-32) 





 


Anion Gap   13 (6-14)  


 


Blood Urea Nitrogen


 


 


 92 mg/dL


(7-20) 





 


Creatinine


 


 


 1.9 mg/dL


(0.6-1.0) 





 


Estimated GFR


(Cockcroft-Gault) 


 


 28.1 


 





 


BUN/Creatinine Ratio   48 (6-20)  


 


Glucose Level


 


 


 152 mg/dL


(70-99) 





 


Calcium Level


 


 


 8.6 mg/dL


(8.5-10.1) 





 


Phosphorus Level


 


 


 5.2 mg/dL


(2.6-4.7) 





 


Magnesium Level


 


 


 1.7 mg/dL


(1.8-2.4) 





 


Total Bilirubin


 


 


 0.4 mg/dL


(0.2-1.0) 





 


Aspartate Amino Transf


(AST/SGOT) 


 


 23 U/L (15-37) 


 





 


Alanine Aminotransferase


(ALT/SGPT) 


 


 11 U/L (14-59) 


 





 


Alkaline Phosphatase


 


 


 49 U/L


() 





 


Total Protein


 


 


 5.1 g/dL


(6.4-8.2) 





 


Albumin


 


 


 2.6 g/dL


(3.4-5.0) 





 


Albumin/Globulin Ratio   1.0 (1.0-1.7)  








Laboratory Tests








Test


 4/17/20


08:00 4/17/20


13:08 4/17/20


17:06 4/18/20


06:10


 


O2 Saturation 98 % (92-99)    


 


Arterial Blood pH


 7.47


(7.35-7.45) 


 


 





 


Arterial Blood pCO2 at


Patient Temp 33 mmHg


(35-46) 


 


 





 


Arterial Blood pO2 at Patient


Temp 136 mmHg


() 


 


 





 


Arterial Blood HCO3


 24 mmol/L


(21-28) 


 


 





 


Arterial Blood Base Excess


 0 mmol/L


(-3-3) 


 


 





 


FiO2 35    


 


Glucose (Fingerstick)


 


 181 mg/dL


(70-99) 146 mg/dL


(70-99) 





 


Sodium Level


 


 


 


 141 mmol/L


(136-145)


 


Potassium Level


 


 


 


 3.3 mmol/L


(3.5-5.1)


 


Chloride Level


 


 


 


 103 mmol/L


()


 


Carbon Dioxide Level


 


 


 


 25 mmol/L


(21-32)


 


Anion Gap    13 (6-14) 


 


Blood Urea Nitrogen


 


 


 


 92 mg/dL


(7-20)


 


Creatinine


 


 


 


 1.9 mg/dL


(0.6-1.0)


 


Estimated GFR


(Cockcroft-Gault) 


 


 


 28.1 





 


BUN/Creatinine Ratio    48 (6-20) 


 


Glucose Level


 


 


 


 152 mg/dL


(70-99)


 


Calcium Level


 


 


 


 8.6 mg/dL


(8.5-10.1)


 


Phosphorus Level


 


 


 


 5.2 mg/dL


(2.6-4.7)


 


Magnesium Level


 


 


 


 1.7 mg/dL


(1.8-2.4)


 


Total Bilirubin


 


 


 


 0.4 mg/dL


(0.2-1.0)


 


Aspartate Amino Transf


(AST/SGOT) 


 


 


 23 U/L (15-37) 





 


Alanine Aminotransferase


(ALT/SGPT) 


 


 


 11 U/L (14-59) 





 


Alkaline Phosphatase


 


 


 


 49 U/L


()


 


Total Protein


 


 


 


 5.1 g/dL


(6.4-8.2)


 


Albumin


 


 


 


 2.6 g/dL


(3.4-5.0)


 


Albumin/Globulin Ratio    1.0 (1.0-1.7) 








Medications





Active Scripts








 Medications  Dose


 Route/Sig


 Max Daily Dose Days Date Category


 


 Bisoprolol


 Fumarate 5 Mg


 Tablet  10 Mg


 PO DAILY


   3/16/20 Reported











Impression


.


IMPRESSION:


1.  Acute hypoxemic respiratory failure secondary to ARDS status post trach,


2.  Gallstone pancreatitis.with NECROSIS, NOT A SURGICAL CANDIDATE


3.  Severe metabolic acidosis.stable


4.  Acute kidney injury-stable, ON HD-- continue to improve 


5.  Acute gallstone pancreatitis.


6.  Hypoalbuminemia.


7.  Moderate persistent effusions


8.  Fever-resolved 


9.  Chronic anemia


10. Covid 19 testing negative


11. Moderate to large ascites-S/P paracentisis


1





Plan


.


Cont. ventilatory support, ABG reviewed 35%/PEEP 5, weaning as tolerated, 

monitor resp status closely, 


Place back on A/C mod at HS


hep sq and protonix for prophylaxis


Follow surgery recs


Follow ID rec, abx per id


Follow nephrology recs 


Tolerated paracentesis well removed 4 liters on 4./15/20


continue TPN for nutrition 





DVT/GI PPX 


d/w RN/RT











MAN BLAKE MD               Apr 18, 2020 07:17

## 2020-04-18 NOTE — NUR
Patient vomiting during dialysis, vomited after returning from dialysis approx 50ml out. 
Compazine given.

## 2020-04-18 NOTE — PDOC
PROGRESS NOTES


Subjective


Subjective


SEEN IN FOLLOW UP OF RENAL FAILURE





Objective


Objective





Vital Signs








  Date Time  Temp Pulse Resp B/P (MAP) Pulse Ox O2 Delivery O2 Flow Rate FiO2


 


4/18/20 12:00 99.4 97 20 117/78 (91) 100 Ventilator  





 99.4       


 


4/15/20 01:42       6.0 











l


Intake and Output 


 


 4/18/20





 07:00


 


Intake Total 4108.16 ml


 


Output Total 2605 ml


 


Balance 1503.16 ml


 


 


 


IV Total 4108.16 ml


 


Output Urine Total 2205 ml


 


Gastric Drainage Total 400 ml











Physical Exam


Abdomen:  Other (DISTENDED)


Extremities:  Other (ANASARCA)


General:  Other (SOMNULENT)


Lungs:  Clear to auscultation, Normal air movement


Psych/Mental Status:  Other (SOMNULENT)





Diagnosis


RENAL FAILURE:  Acute (Acute tubular necrosis)


Other


PANCREATITIS





Assessment


Assessment


Problems


Medical Problems:


(1) Acute pancreatitis


Status: Acute  





(2) Cholelithiasis


Status: Acute  








Plan


Plan of Care


SEEN AND EVALUATED AT DIALYSIS. REQUIRED IV ALBUMIN FOR BP SUPPORT DUE TO THIRD 

SPACING OF FLUID. CONT ON TPN. NEXT DIALYSIS MONDAY





Comment


Review of Relevant


I have reviewed the following items josy (where applicable) has been applied.


Labs





Laboratory Tests








Test


 4/16/20


19:31 4/16/20


23:53 4/17/20


06:00 4/17/20


06:06


 


Glucose (Fingerstick)


 142 mg/dL


(70-99) 145 mg/dL


(70-99) 


 129 mg/dL


(70-99)


 


Sodium Level


 


 


 140 mmol/L


(136-145) 





 


Potassium Level


 


 


 3.7 mmol/L


(3.5-5.1) 





 


Chloride Level


 


 


 103 mmol/L


() 





 


Carbon Dioxide Level


 


 


 26 mmol/L


(21-32) 





 


Anion Gap   11 (6-14)  


 


Blood Urea Nitrogen


 


 


 80 mg/dL


(7-20) 





 


Creatinine


 


 


 1.6 mg/dL


(0.6-1.0) 





 


Estimated GFR


(Cockcroft-Gault) 


 


 34.3 


 





 


BUN/Creatinine Ratio   50 (6-20)  


 


Glucose Level


 


 


 130 mg/dL


(70-99) 





 


Calcium Level


 


 


 8.4 mg/dL


(8.5-10.1) 





 


Phosphorus Level


 


 


 5.2 mg/dL


(2.6-4.7) 





 


Total Bilirubin


 


 


 0.4 mg/dL


(0.2-1.0) 





 


Aspartate Amino Transf


(AST/SGOT) 


 


 23 U/L (15-37) 


 





 


Alanine Aminotransferase


(ALT/SGPT) 


 


 12 U/L (14-59) 


 





 


Alkaline Phosphatase


 


 


 52 U/L


() 





 


Total Protein


 


 


 4.9 g/dL


(6.4-8.2) 





 


Albumin


 


 


 2.6 g/dL


(3.4-5.0) 





 


Albumin/Globulin Ratio   1.1 (1.0-1.7)  


 


Test


 4/17/20


07:00 4/17/20


08:00 4/17/20


13:08 4/17/20


17:06


 


White Blood Count


 5.8 x10^3/uL


(4.0-11.0) 


 


 





 


Red Blood Count


 2.40 x10^6/uL


(3.50-5.40) 


 


 





 


Hemoglobin


 7.6 g/dL


(12.0-15.5) 


 


 





 


Hematocrit


 22.8 %


(36.0-47.0) 


 


 





 


Mean Corpuscular Volume 95 fL ()    


 


Mean Corpuscular Hemoglobin 32 pg (25-35)    


 


Mean Corpuscular Hemoglobin


Concent 33 g/dL


(31-37) 


 


 





 


Red Cell Distribution Width


 18.4 %


(11.5-14.5) 


 


 





 


Platelet Count


 498 x10^3/uL


(140-400) 


 


 





 


Neutrophils (%) (Auto) 67 % (31-73)    


 


Lymphocytes (%) (Auto) 17 % (24-48)    


 


Monocytes (%) (Auto) 14 % (0-9)    


 


Eosinophils (%) (Auto) 1 % (0-3)    


 


Basophils (%) (Auto) 1 % (0-3)    


 


Neutrophils # (Auto)


 3.9 x10^3/uL


(1.8-7.7) 


 


 





 


Lymphocytes # (Auto)


 1.0 x10^3/uL


(1.0-4.8) 


 


 





 


Monocytes # (Auto)


 0.8 x10^3/uL


(0.0-1.1) 


 


 





 


Eosinophils # (Auto)


 0.0 x10^3/uL


(0.0-0.7) 


 


 





 


Basophils # (Auto)


 0.0 x10^3/uL


(0.0-0.2) 


 


 





 


O2 Saturation  98 % (92-99)   


 


Arterial Blood pH


 


 7.47


(7.35-7.45) 


 





 


Arterial Blood pCO2 at


Patient Temp 


 33 mmHg


(35-46) 


 





 


Arterial Blood pO2 at Patient


Temp 


 136 mmHg


() 


 





 


Arterial Blood HCO3


 


 24 mmol/L


(21-28) 


 





 


Arterial Blood Base Excess


 


 0 mmol/L


(-3-3) 


 





 


FiO2  35   


 


Glucose (Fingerstick)


 


 


 181 mg/dL


(70-99) 146 mg/dL


(70-99)


 


Test


 4/18/20


06:10 4/18/20


07:17 


 





 


White Blood Count


 6.3 x10^3/uL


(4.0-11.0) 


 


 





 


Red Blood Count


 2.23 x10^6/uL


(3.50-5.40) 


 


 





 


Hemoglobin


 7.1 g/dL


(12.0-15.5) 


 


 





 


Hematocrit


 21.1 %


(36.0-47.0) 


 


 





 


Mean Corpuscular Volume 95 fL ()    


 


Mean Corpuscular Hemoglobin 32 pg (25-35)    


 


Mean Corpuscular Hemoglobin


Concent 33 g/dL


(31-37) 


 


 





 


Red Cell Distribution Width


 18.1 %


(11.5-14.5) 


 


 





 


Platelet Count


 539 x10^3/uL


(140-400) 


 


 





 


Neutrophils (%) (Auto) 74 % (31-73)    


 


Lymphocytes (%) (Auto) 12 % (24-48)    


 


Monocytes (%) (Auto) 14 % (0-9)    


 


Eosinophils (%) (Auto) 0 % (0-3)    


 


Basophils (%) (Auto) 0 % (0-3)    


 


Neutrophils # (Auto)


 4.7 x10^3/uL


(1.8-7.7) 


 


 





 


Lymphocytes # (Auto)


 0.7 x10^3/uL


(1.0-4.8) 


 


 





 


Monocytes # (Auto)


 0.9 x10^3/uL


(0.0-1.1) 


 


 





 


Eosinophils # (Auto)


 0.0 x10^3/uL


(0.0-0.7) 


 


 





 


Basophils # (Auto)


 0.0 x10^3/uL


(0.0-0.2) 


 


 





 


Sodium Level


 141 mmol/L


(136-145) 


 


 





 


Potassium Level


 3.3 mmol/L


(3.5-5.1) 


 


 





 


Chloride Level


 103 mmol/L


() 


 


 





 


Carbon Dioxide Level


 25 mmol/L


(21-32) 


 


 





 


Anion Gap 13 (6-14)    


 


Blood Urea Nitrogen


 92 mg/dL


(7-20) 


 


 





 


Creatinine


 1.9 mg/dL


(0.6-1.0) 


 


 





 


Estimated GFR


(Cockcroft-Gault) 28.1 


 


 


 





 


BUN/Creatinine Ratio 48 (6-20)    


 


Glucose Level


 152 mg/dL


(70-99) 


 


 





 


Calcium Level


 8.6 mg/dL


(8.5-10.1) 


 


 





 


Phosphorus Level


 5.2 mg/dL


(2.6-4.7) 


 


 





 


Magnesium Level


 1.7 mg/dL


(1.8-2.4) 


 


 





 


Total Bilirubin


 0.4 mg/dL


(0.2-1.0) 


 


 





 


Aspartate Amino Transf


(AST/SGOT) 23 U/L (15-37) 


 


 


 





 


Alanine Aminotransferase


(ALT/SGPT) 11 U/L (14-59) 


 


 


 





 


Alkaline Phosphatase


 49 U/L


() 


 


 





 


Total Protein


 5.1 g/dL


(6.4-8.2) 


 


 





 


Albumin


 2.6 g/dL


(3.4-5.0) 


 


 





 


Albumin/Globulin Ratio 1.0 (1.0-1.7)    


 


Glucose (Fingerstick)


 


 137 mg/dL


(70-99) 


 











Laboratory Tests








Test


 4/17/20


17:06 4/18/20


06:10 4/18/20


07:17


 


Glucose (Fingerstick)


 146 mg/dL


(70-99) 


 137 mg/dL


(70-99)


 


White Blood Count


 


 6.3 x10^3/uL


(4.0-11.0) 





 


Red Blood Count


 


 2.23 x10^6/uL


(3.50-5.40) 





 


Hemoglobin


 


 7.1 g/dL


(12.0-15.5) 





 


Hematocrit


 


 21.1 %


(36.0-47.0) 





 


Mean Corpuscular Volume  95 fL ()  


 


Mean Corpuscular Hemoglobin  32 pg (25-35)  


 


Mean Corpuscular Hemoglobin


Concent 


 33 g/dL


(31-37) 





 


Red Cell Distribution Width


 


 18.1 %


(11.5-14.5) 





 


Platelet Count


 


 539 x10^3/uL


(140-400) 





 


Neutrophils (%) (Auto)  74 % (31-73)  


 


Lymphocytes (%) (Auto)  12 % (24-48)  


 


Monocytes (%) (Auto)  14 % (0-9)  


 


Eosinophils (%) (Auto)  0 % (0-3)  


 


Basophils (%) (Auto)  0 % (0-3)  


 


Neutrophils # (Auto)


 


 4.7 x10^3/uL


(1.8-7.7) 





 


Lymphocytes # (Auto)


 


 0.7 x10^3/uL


(1.0-4.8) 





 


Monocytes # (Auto)


 


 0.9 x10^3/uL


(0.0-1.1) 





 


Eosinophils # (Auto)


 


 0.0 x10^3/uL


(0.0-0.7) 





 


Basophils # (Auto)


 


 0.0 x10^3/uL


(0.0-0.2) 





 


Sodium Level


 


 141 mmol/L


(136-145) 





 


Potassium Level


 


 3.3 mmol/L


(3.5-5.1) 





 


Chloride Level


 


 103 mmol/L


() 





 


Carbon Dioxide Level


 


 25 mmol/L


(21-32) 





 


Anion Gap  13 (6-14)  


 


Blood Urea Nitrogen


 


 92 mg/dL


(7-20) 





 


Creatinine


 


 1.9 mg/dL


(0.6-1.0) 





 


Estimated GFR


(Cockcroft-Gault) 


 28.1 


 





 


BUN/Creatinine Ratio  48 (6-20)  


 


Glucose Level


 


 152 mg/dL


(70-99) 





 


Calcium Level


 


 8.6 mg/dL


(8.5-10.1) 





 


Phosphorus Level


 


 5.2 mg/dL


(2.6-4.7) 





 


Magnesium Level


 


 1.7 mg/dL


(1.8-2.4) 





 


Total Bilirubin


 


 0.4 mg/dL


(0.2-1.0) 





 


Aspartate Amino Transf


(AST/SGOT) 


 23 U/L (15-37) 


 





 


Alanine Aminotransferase


(ALT/SGPT) 


 11 U/L (14-59) 


 





 


Alkaline Phosphatase


 


 49 U/L


() 





 


Total Protein


 


 5.1 g/dL


(6.4-8.2) 





 


Albumin


 


 2.6 g/dL


(3.4-5.0) 





 


Albumin/Globulin Ratio  1.0 (1.0-1.7)  








Microbiology


4/15/20 Aerobic and Anaerobic Culture, Resulted


          Pending


4/15/20 Anaerobic Culture Result 1 (ANSON), Resulted


          Pending


4/15/20 Aerobic Culture, Resulted


          Pending


4/15/20 Aerobic Culture Result 1 (ANSON), Resulted


          Pending


4/15/20 Gram Stain - Final, Resulted


          


4/15/20 Gram Stain Result 1 (ANSON) - Final, Resulted


          


4/15/20 Gram Stain Result 2 ([facility]SON) - Final, Resulted


          


4/14/20 Blood Culture - Preliminary, Resulted


          NO GROWTH AFTER 3 DAYS


4/12/20 Urine Culture - Final, Complete


          


4/12/20 Urine Culture Result 1 (ANSON) - Final, Complete


Medications





Current Medications


Sodium Chloride 1,000 ml @  1,000 mls/hr Q1H IV  Last administered on 3/16/20at 

03:00;  Start 3/16/20 at 03:00;  Stop 3/16/20 at 03:59;  Status DC


Ondansetron HCl (Zofran) 4 mg 1X  ONCE IVP  Last administered on 3/16/20at 

03:27;  Start 3/16/20 at 03:00;  Stop 3/16/20 at 03:01;  Status DC


Morphine Sulfate (Morphine Sulfate) 4 mg 1X  ONCE IV ;  Start 3/16/20 at 03:00; 

Stop 3/16/20 at 03:01;  Status Cancel


Ketorolac Tromethamine (Toradol 30mg Vial) 30 mg 1X  ONCE IV  Last administered 

on 3/16/20at 02:54;  Start 3/16/20 at 03:00;  Stop 3/16/20 at 03:01;  Status DC


Fentanyl Citrate (Fentanyl 2ml Vial) 25 mcg 1X  ONCE IVP  Last administered on 

3/16/20at 03:23;  Start 3/16/20 at 03:30;  Stop 3/16/20 at 03:31;  Status DC


Fentanyl Citrate (Fentanyl 2ml Vial) 100 mcg STK-MED ONCE .ROUTE ;  Start 

3/16/20 at 03:18;  Stop 3/16/20 at 03:18;  Status DC


Iohexol (Omnipaque 350 Mg/ml) 90 ml 1X  ONCE IV  Last administered on 3/16/20at 

03:25;  Start 3/16/20 at 03:30;  Stop 3/16/20 at 03:31;  Status DC


Info (CONTRAST GIVEN -- Rx MONITORING) 1 each PRN DAILY  PRN MC SEE COMMENTS;  

Start 3/16/20 at 03:30;  Stop 3/18/20 at 03:29;  Status DC


Hydromorphone HCl (Dilaudid) 0.5 mg 1X  ONCE IV  Last administered on 3/16/20at 

03:55;  Start 3/16/20 at 04:30;  Stop 3/16/20 at 04:32;  Status DC


Ondansetron HCl (Zofran) 4 mg PRN Q8HRS  PRN IV NAUSEA/VOMITING 1ST CHOICE;  

Start 3/16/20 at 05:00;  Stop 3/16/20 at 09:27;  Status DC


Morphine Sulfate (Morphine Sulfate) 2 mg PRN Q2HR  PRN IV SEVERE PAIN 7-10 Last 

administered on 3/17/20at 12:26;  Start 3/16/20 at 05:00;  Stop 3/17/20 at 

14:15;  Status DC


Sodium Chloride 1,000 ml @  125 mls/hr Q8H IV  Last administered on 3/16/20at 

20:56;  Start 3/16/20 at 05:00;  Stop 3/17/20 at 04:59;  Status DC


Hydromorphone HCl (Dilaudid) 0.5 mg PRN Q3HRS  PRN IV SEVERE PAIN 7-10 Last 

administered on 3/17/20at 10:06;  Start 3/16/20 at 05:00;  Stop 3/17/20 at 

12:01;  Status DC


Piperacillin Sod/ Tazobactam Sod 4.5 gm/Sodium Chloride 100 ml @  200 mls/hr 1X 

ONCE IV  Last administered on 3/16/20at 05:44;  Start 3/16/20 at 06:00;  Stop 

3/16/20 at 06:29;  Status DC


Ondansetron HCl (Zofran) 4 mg PRN Q4HRS  PRN IV NAUSEA/VOMITING 1ST CHOICE Last 

administered on 4/17/20at 18:01;  Start 3/16/20 at 09:30


Insulin Human Lispro (HumaLOG) 0-9 UNITS Q6HRS SQ  Last administered on 

4/17/20at 12:00;  Start 3/16/20 at 09:30


Dextrose (Dextrose 50%-Water Syringe) 12.5 gm PRN Q15MIN  PRN IV SEE COMMENTS;  

Start 3/16/20 at 09:30


Pantoprazole Sodium (PROTONIX VIAL for IV PUSH) 40 mg DAILYAC IVP  Last 

administered on 4/17/20at 09:49;  Start 3/16/20 at 11:30


Prochlorperazine Edisylate (Compazine) 10 mg PRN Q6HRS  PRN IV NAUSEA/VOMITING, 

2nd CHOICE Last administered on 4/17/20at 20:45;  Start 3/16/20 at 17:45


Atenolol (Tenormin) 100 mg DAILY PO ;  Start 3/17/20 at 09:00;  Stop 3/16/20 at 

20:08;  Status DC


Metoprolol Tartrate (Lopressor Vial) 2.5 mg Q6HRS IVP  Last administered on 

3/17/20at 05:51;  Start 3/16/20 at 20:15;  Stop 3/17/20 at 10:02;  Status DC


Metoprolol Tartrate (Lopressor Vial) 5 mg Q6HRS IVP  Last administered on 

3/26/20at 00:12;  Start 3/17/20 at 10:15;  Stop 3/28/20 at 08:48;  Status DC


Hydromorphone HCl (Dilaudid) 1 mg PRN Q3HRS  PRN IV SEVERE PAIN 7-10 Last 

administered on 3/23/20at 05:13;  Start 3/17/20 at 12:00;  Stop 3/31/20 at 

00:25;  Status DC


Lidocaine HCl (Buffered Lidocaine 1%) 3 ml STK-MED ONCE .ROUTE ;  Start 3/17/20 

at 12:55;  Stop 3/17/20 at 12:56;  Status DC


Albumin Human 500 ml @  125 mls/hr 1X  ONCE IV  Last administered on 3/17/20at 

14:33;  Start 3/17/20 at 14:30;  Stop 3/17/20 at 18:32;  Status DC


Norepinephrine Bitartrate 8 mg/ Dextrose 258 ml @  17.299 mls/ hr CONT  PRN IV 

PER PROTOCOL Last administered on 4/14/20at 12:48;  Start 3/17/20 at 15:30;  

Stop 4/17/20 at 09:19;  Status DC


Sodium Chloride 1,000 ml @  125 mls/hr Q8H IV  Last administered on 3/17/20at 

21:04;  Start 3/17/20 at 16:00;  Stop 3/18/20 at 02:42;  Status DC


Albumin Human 500 ml @  125 mls/hr PRN BID  PRN IV After every 2L NSS & BP < 

90mm Last administered on 4/1/20at 14:21;  Start 3/17/20 at 16:00


Iohexol (Omnipaque 300 Mg/ml) 60 ml 1X  ONCE IV  Last administered on 3/17/20at 

17:20;  Start 3/17/20 at 17:00;  Stop 3/17/20 at 17:01;  Status DC


Info (CONTRAST GIVEN -- Rx MONITORING) 1 each PRN DAILY  PRN MC SEE COMMENTS;  

Start 3/17/20 at 17:00;  Stop 3/19/20 at 16:59;  Status DC


Meropenem 1 gm/ Sodium Chloride 100 ml @  200 mls/hr Q8HRS IV  Last administered

on 3/18/20at 05:45;  Start 3/17/20 at 20:00;  Stop 3/18/20 at 08:48;  Status DC


Furosemide (Lasix) 40 mg 1X  ONCE IVP  Last administered on 3/17/20at 22:12;  

Start 3/17/20 at 22:30;  Stop 3/17/20 at 22:31;  Status DC


Calcium Chloride 1000 mg/Sodium Chloride 110 ml @  220 mls/hr 1X  ONCE IV  Last 

administered on 3/17/20at 22:11;  Start 3/17/20 at 22:30;  Stop 3/17/20 at 

22:59;  Status DC


Albuterol Sulfate (Ventolin Neb Soln) 2.5 mg 1X  ONCE NEB  Last administered on 

3/18/20at 00:56;  Start 3/17/20 at 22:30;  Stop 3/17/20 at 22:31;  Status DC


Insulin Human Regular (HumuLIN R VIAL) 5 unit 1X  ONCE IV  Last administered on 

3/17/20at 22:14;  Start 3/17/20 at 22:30;  Stop 3/17/20 at 22:31;  Status DC


Magnesium Sulfate 50 ml @ 25 mls/hr 1X  ONCE IV  Last administered on 3/18/20at 

02:57;  Start 3/18/20 at 03:00;  Stop 3/18/20 at 04:59;  Status DC


Calcium Gluconate 1000 mg/Sodium Chloride 110 ml @  220 mls/hr 1X  ONCE IV  Last

administered on 3/18/20at 02:46;  Start 3/18/20 at 03:00;  Stop 3/18/20 at 

03:29;  Status DC


Sodium Chloride 1,000 ml @  200 mls/hr Q5H IV  Last administered on 3/18/20at 

02:46;  Start 3/18/20 at 03:00;  Stop 3/18/20 at 10:21;  Status DC


Calcium Gluconate 1000 mg/Sodium Chloride 110 ml @  220 mls/hr 1X  ONCE IV  Last

administered on 3/18/20at 03:21;  Start 3/18/20 at 03:30;  Stop 3/18/20 at 

03:59;  Status DC


Sodium Bicarbonate 50 meq/Sodium Chloride 1,050 ml @  75 mls/hr Q14H IV  Last 

administered on 3/22/20at 21:10;  Start 3/18/20 at 07:30;  Stop 3/23/20 at 

10:28;  Status DC


Calcium Gluconate 2000 mg/Sodium Chloride 120 ml @  220 mls/hr 1X  ONCE IV  Last

administered on 3/18/20at 09:05;  Start 3/18/20 at 07:30;  Stop 3/18/20 at 

08:02;  Status DC


Lidocaine HCl (Xylocaine-Mpf 1% 2ml Vial) 2 ml STK-MED ONCE .ROUTE ;  Start 

3/18/20 at 08:47;  Stop 3/18/20 at 08:47;  Status DC


Meropenem 500 mg/ Sodium Chloride 50 ml @  100 mls/hr Q12HR IV  Last 

administered on 3/23/20at 21:01;  Start 3/18/20 at 18:00;  Stop 3/24/20 at 

07:58;  Status DC


Lidocaine HCl (Buffered Lidocaine 1%) 3 ml STK-MED ONCE .ROUTE ;  Start 3/18/20 

at 09:46;  Stop 3/18/20 at 09:46;  Status DC


Lidocaine HCl (Buffered Lidocaine 1%) 6 ml 1X  ONCE INJ  Last administered on 

3/18/20at 10:26;  Start 3/18/20 at 10:15;  Stop 3/18/20 at 10:16;  Status DC


Info (Tpn Per Pharmacy) 1 each PRN DAILY  PRN MC SEE COMMENTS Last administered 

on 4/18/20at 13:05;  Start 3/18/20 at 12:00


Sodium Chloride 1,000 ml @  1,000 mls/hr Q1H PRN IV hypotension;  Start 3/18/20 

at 12:07;  Stop 3/18/20 at 18:06;  Status DC


Diphenhydramine HCl (Benadryl) 25 mg 1X PRN  PRN IV ITCHING;  Start 3/18/20 at 

12:15;  Stop 3/19/20 at 12:14;  Status DC


Diphenhydramine HCl (Benadryl) 25 mg 1X PRN  PRN IV ITCHING;  Start 3/18/20 at 

12:15;  Stop 3/19/20 at 12:14;  Status DC


Sodium Chloride 1,000 ml @  400 mls/hr Q2H30M PRN IV PATENCY;  Start 3/18/20 at 

12:07;  Stop 3/19/20 at 00:06;  Status DC


Info (PHARMACY MONITORING -- do not chart) 1 each PRN DAILY  PRN MC SEE 

COMMENTS;  Start 3/18/20 at 12:15;  Stop 3/20/20 at 08:13;  Status DC


Sodium Chloride 90 meq/Calcium Gluconate 10 meq/ Multivitamins 10 ml/Chromium/ 

Copper/Manganese/ Seleni/Zn 1 ml/ Total Parenteral Nutrition/Amino 

Acids/Dextrose/ Fat Emulsion Intravenous 55.005 ml  @ 2.292 mls/hr TPN  CONT IV 

;  Start 3/18/20 at 22:00;  Stop 3/18/20 at 12:33;  Status DC


Info (Tpn Per Pharmacy) 1 each PRN DAILY  PRN MC SEE COMMENTS;  Start 3/18/20 at

12:30;  Status UNV


Sodium Chloride 90 meq/Calcium Gluconate 10 meq/ Multivitamins 10 ml/Chromium/ 

Copper/Manganese/ Seleni/Zn 0.5 ml/ Total Parenteral Nutrition/Amino 

Acids/Dextrose/ Fat Emulsion Intravenous 1,512 ml @  63 mls/hr TPN  CONT IV  

Last administered on 3/18/20at 22:06;  Start 3/18/20 at 22:00;  Stop 3/19/20 at 

21:59;  Status DC


Calcium Carbonate/ Glycine (Tums) 500 mg PRN AFTMEALHC  PRN PO INDIGESTION;  

Start 3/18/20 at 17:45


Calcium Gluconate (Calcium Gluconate) 2,000 mg 1X  ONCE IVP  Last administered 

on 3/19/20at 02:19;  Start 3/19/20 at 02:15;  Stop 3/19/20 at 02:16;  Status DC


Calcium Chloride 3000 mg/Sodium Chloride 1,030 ml @  50 mls/hr [id number] IV  Last 

administered on 3/21/20at 02:17;  Start 3/19/20 at 08:00;  Stop 3/21/20 at 

15:23;  Status DC


Lorazepam (Ativan Inj) 1 mg PRN Q4HRS  PRN IVP ANXIETY / AGITATION, 2nd choic 

Last administered on 4/17/20at 03:51;  Start 3/19/20 at 09:00;  Stop 4/17/20 at 

09:19;  Status DC


Sodium Chloride 1,000 ml @  1,000 mls/hr Q1H PRN IV hypotension;  Start 3/19/20 

at 08:56;  Stop 3/19/20 at 14:55;  Status DC


Albumin Human 200 ml @  200 mls/hr 1X PRN  PRN IV Hypotension;  Start 3/19/20 at

09:00;  Stop 3/19/20 at 14:59;  Status DC


Diphenhydramine HCl (Benadryl) 25 mg 1X PRN  PRN IV ITCHING;  Start 3/19/20 at 

09:00;  Stop 3/20/20 at 08:59;  Status DC


Diphenhydramine HCl (Benadryl) 25 mg 1X PRN  PRN IV ITCHING;  Start 3/19/20 at 

09:00;  Stop 3/20/20 at 08:59;  Status DC


Sodium Chloride 1,000 ml @  400 mls/hr Q2H30M PRN IV PATENCY;  Start 3/19/20 at 

08:56;  Stop 3/19/20 at 20:55;  Status DC


Info (PHARMACY MONITORING -- do not chart) 1 each PRN DAILY  PRN MC SEE 

COMMENTS;  Start 3/19/20 at 09:00;  Status UNV


Info (PHARMACY MONITORING -- do not chart) 1 each PRN DAILY  PRN MC SEE 

COMMENTS;  Start 3/19/20 at 09:00;  Stop 3/20/20 at 08:13;  Status DC


Digoxin (Lanoxin) 500 mcg 1X  ONCE IV  Last administered on 3/19/20at 10:04;  

Start 3/19/20 at 10:00;  Stop 3/19/20 at 10:01;  Status DC


Digoxin (Lanoxin) 125 mcg 1X  ONCE IV  Last administered on 3/19/20at 17:10;  

Start 3/19/20 at 18:00;  Stop 3/19/20 at 18:01;  Status DC


Magnesium Sulfate 100 ml @  25 mls/hr 1X  ONCE IV  Last administered on 

3/19/20at 12:48;  Start 3/19/20 at 13:00;  Stop 3/19/20 at 16:59;  Status DC


Sodium Chloride 90 meq/Magnesium Sulfate 10 meq/ Calcium Gluconate 20 meq/ 

Multivitamins 10 ml/Chromium/ Copper/Manganese/ Seleni/Zn 0.5 ml/ Total 

Parenteral Nutrition/Amino Acids/Dextrose/ Fat Emulsion Intravenous 1,512 ml @  

63 mls/hr TPN  CONT IV  Last administered on 3/19/20at 22:25;  Start 3/19/20 at 

22:00;  Stop 3/20/20 at 21:59;  Status DC


Sodium Chloride 1,000 ml @  1,000 mls/hr Q1H PRN IV hypotension;  Start 3/20/20 

at 08:05;  Stop 3/20/20 at 14:04;  Status DC


Albumin Human 200 ml @  200 mls/hr 1X  ONCE IV  Last administered on 3/20/20at 

08:57;  Start 3/20/20 at 08:15;  Stop 3/20/20 at 09:14;  Status DC


Diphenhydramine HCl (Benadryl) 25 mg 1X PRN  PRN IV ITCHING;  Start 3/20/20 at 

08:15;  Stop 3/21/20 at 08:14;  Status DC


Diphenhydramine HCl (Benadryl) 25 mg 1X PRN  PRN IV ITCHING;  Start 3/20/20 at 

08:15;  Stop 3/21/20 at 08:14;  Status DC


Sodium Chloride 1,000 ml @  400 mls/hr Q2H30M PRN IV PATENCY;  Start 3/20/20 at 

08:05;  Stop 3/20/20 at 20:04;  Status DC


Info (PHARMACY MONITORING -- do not chart) 1 each PRN DAILY  PRN MC SEE 

COMMENTS;  Start 3/20/20 at 08:15;  Stop 3/24/20 at 07:57;  Status DC


Sodium Chloride 90 meq/Potassium Chloride 15 meq/ Potassium Phosphate 10 mmol/ 

Magnesium Sulfate 10 meq/Calcium Gluconate 20 meq/ Multivitamins 10 ml/Chromium/

Copper/Manganese/ Seleni/Zn 0.5 ml/ Total Parenteral Nutrition/Amino 

Acids/Dextrose/ Fat Emulsion Intravenous 1,512 ml @  63 mls/hr TPN  CONT IV  

Last administered on 3/20/20at 21:01;  Start 3/20/20 at 22:00;  Stop 3/21/20 at 

21:59;  Status DC


Potassium Chloride/Water 100 ml @  100 mls/hr 1X  ONCE IV  Last administered on 

3/20/20at 14:09;  Start 3/20/20 at 14:00;  Stop 3/20/20 at 14:59;  Status DC


Benzocaine (Hurricaine One) 1 spray 1X  ONCE MM  Last administered on 3/20/20at 

16:38;  Start 3/20/20 at 14:30;  Stop 3/20/20 at 14:31;  Status DC


Lidocaine HCl (Glydo (Lidocaine) Jelly) 1 thomas 1X  ONCE MM  Last administered on 

3/20/20at 16:38;  Start 3/20/20 at 14:30;  Stop 3/20/20 at 14:31;  Status DC


Linezolid/Dextrose 300 ml @  300 mls/hr Q12HR IV  Last administered on 3/26/20at

21:04;  Start 3/20/20 at 20:00;  Stop 3/27/20 at 07:50;  Status DC


Acetaminophen (Tylenol) 650 mg PRN Q6HRS  PRN PO MILD PAIN / TEMP;  Start 

3/21/20 at 03:30;  Stop 3/21/20 at 03:36;  Status DC


Acetaminophen (Tylenol) 650 mg PRN Q6HRS  PRN PEG MILD PAIN / TEMP Last 

administered on 4/16/20at 19:56;  Start 3/21/20 at 03:36


Sodium Chloride 1,000 ml @  1,000 mls/hr Q1H PRN IV hypotension;  Start 3/21/20 

at 07:50;  Stop 3/21/20 at 13:49;  Status DC


Albumin Human 200 ml @  200 mls/hr 1X PRN  PRN IV Hypotension;  Start 3/21/20 at

08:00;  Stop 3/21/20 at 13:59;  Status DC


Sodium Chloride (Normal Saline Flush) 10 ml 1X PRN  PRN IV AP catheter pack;  

Start 3/21/20 at 08:00;  Stop 3/22/20 at 07:59;  Status DC


Sodium Chloride (Normal Saline Flush) 10 ml 1X PRN  PRN IV  catheter pack;  

Start 3/21/20 at 08:00;  Stop 3/22/20 at 07:59;  Status DC


Sodium Chloride 1,000 ml @  400 mls/hr Q2H30M PRN IV PATENCY;  Start 3/21/20 at 

07:50;  Stop 3/21/20 at 19:49;  Status DC


Info (PHARMACY MONITORING -- do not chart) 1 each PRN DAILY  PRN MC SEE 

COMMENTS;  Start 3/21/20 at 08:00;  Status UNV


Info (PHARMACY MONITORING -- do not chart) 1 each PRN DAILY  PRN MC SEE 

COMMENTS;  Start 3/21/20 at 08:00;  Stop 3/23/20 at 08:25;  Status DC


Sodium Chloride 90 meq/Potassium Chloride 15 meq/ Potassium Phosphate 10 mmol/ 

Magnesium Sulfate 10 meq/Calcium Gluconate 20 meq/ Multivitamins 10 ml/Chromium/

Copper/Manganese/ Seleni/Zn 0.5 ml/ Total Parenteral Nutrition/Amino 

Acids/Dextrose/ Fat Emulsion Intravenous 1,512 ml @  63 mls/hr TPN  CONT IV  

Last administered on 3/21/20at 20:57;  Start 3/21/20 at 22:00;  Stop 3/22/20 at 

21:59;  Status DC


Sodium Chloride 90 meq/Potassium Chloride 15 meq/ Potassium Phosphate 15 mmol/ 

Magnesium Sulfate 10 meq/Calcium Gluconate 20 meq/ Multivitamins 10 ml/Chromium/

Copper/Manganese/ Seleni/Zn 0.5 ml/ Total Parenteral Nutrition/Amino 

Acids/Dextrose/ Fat Emulsion Intravenous 1,512 ml @  63 mls/hr TPN  CONT IV ;  

Start 3/22/20 at 22:00;  Stop 3/22/20 at 14:16;  Status DC


Sodium Chloride 90 meq/Potassium Chloride 15 meq/ Potassium Phosphate 15 mmol/ 

Magnesium Sulfate 10 meq/Calcium Gluconate 20 meq/ Multivitamins 10 ml/Chromium/

Copper/Manganese/ Seleni/Zn 0.5 ml/ Total Parenteral Nutrition/Amino 

Acids/Dextrose/ Fat Emulsion Intravenous 1,200 ml @  50 mls/hr TPN  CONT IV ;  

Start 3/22/20 at 22:00;  Stop 3/22/20 at 14:17;  Status DC


Sodium Chloride 90 meq/Potassium Chloride 15 meq/ Potassium Phosphate 10 mmol/ 

Magnesium Sulfate 10 meq/Calcium Gluconate 20 meq/ Multivitamins 10 ml/Chromium/

Copper/Manganese/ Seleni/Zn 0.5 ml/ Total Parenteral Nutrition/Amino 

Acids/Dextrose/ Fat Emulsion Intravenous 1,200 ml @  50 mls/hr TPN  CONT IV  

Last administered on 3/22/20at 23:29;  Start 3/22/20 at 22:00;  Stop 3/23/20 at 

21:59;  Status DC


Sodium Chloride 1,000 ml @  1,000 mls/hr Q1H PRN IV hypotension;  Start 3/23/20 

at 07:28;  Stop 3/23/20 at 13:27;  Status DC


Albumin Human 200 ml @  200 mls/hr 1X  ONCE IV  Last administered on 3/23/20at 

08:51;  Start 3/23/20 at 07:30;  Stop 3/23/20 at 08:29;  Status DC


Diphenhydramine HCl (Benadryl) 25 mg 1X PRN  PRN IV ITCHING;  Start 3/23/20 at 

07:30;  Stop 3/24/20 at 07:29;  Status DC


Diphenhydramine HCl (Benadryl) 25 mg 1X PRN  PRN IV ITCHING;  Start 3/23/20 at 

07:30;  Stop 3/24/20 at 07:29;  Status DC


Sodium Chloride 1,000 ml @  400 mls/hr Q2H30M PRN IV PATENCY;  Start 3/23/20 at 

07:28;  Stop 3/23/20 at 19:27;  Status DC


Info (PHARMACY MONITORING -- do not chart) 1 each PRN DAILY  PRN MC SEE 

COMMENTS;  Start 3/23/20 at 07:30;  Stop 4/3/20 at 13:01;  Status DC


Metronidazole 100 ml @  100 mls/hr Q6HRS IV  Last administered on 4/8/20at 

06:26;  Start 3/23/20 at 08:30;  Stop 4/8/20 at 09:58;  Status DC


Micafungin Sodium 100 mg/Dextrose 100 ml @  100 mls/hr Q24H IV  Last 

administered on 4/17/20at 09:50;  Start 3/23/20 at 09:00


Propofol 0 ml @ As Directed STK-MED ONCE IV ;  Start 3/23/20 at 07:53;  Stop 

3/23/20 at 07:53;  Status DC


Etomidate (Amidate) 20 mg STK-MED ONCE IV ;  Start 3/23/20 at 07:53;  Stop 

3/23/20 at 07:54;  Status DC


Midazolam HCl (Versed) 5 mg STK-MED ONCE .ROUTE ;  Start 3/23/20 at 07:57;  Stop

3/23/20 at 07:57;  Status DC


Fentanyl Citrate 30 ml @ 0 mls/hr CONT  PRN IV SEE PROTOCOL Last administered on

4/17/20at 06:12;  Start 3/23/20 at 08:15;  Stop 4/17/20 at 09:19;  Status DC


Artificial Tears (Artificial Tears) 1 drop PRN Q1HR  PRN OU DRY EYE, 1st choice;

 Start 3/23/20 at 08:15


Midazolam HCl 50 mg/Sodium Chloride 50 ml @ 0 mls/hr CONT  PRN IV SEE PROTOCOL 

Last administered on 3/26/20at 22:39;  Start 3/23/20 at 08:15;  Stop 3/28/20 at 

15:59;  Status DC


Etomidate (Amidate) 8 mg 1X  ONCE IV  Last administered on 3/23/20at 08:33;  

Start 3/23/20 at 08:30;  Stop 3/23/20 at 08:31;  Status DC


Succinylcholine Chloride (Anectine) 120 mg 1X  ONCE IV  Last administered on 

3/23/20at 08:34;  Start 3/23/20 at 08:30;  Stop 3/23/20 at 08:31;  Status DC


Midazolam HCl (Versed) 5 mg 1X  ONCE IV ;  Start 3/23/20 at 08:30;  Stop 3/23/20

at 08:31;  Status DC


Potassium Chloride 15 meq/ Bicarbonate Dialysis Soln w/ out KCl 5,007.5 ml  @ 

1,000 mls/ hr Q5H1M IV  Last administered on 3/24/20at 11:11;  Start 3/23/20 at 

12:00;  Stop 3/24/20 at 11:15;  Status DC


Potassium Chloride 15 meq/ Bicarbonate Dialysis Soln w/ out KCl 5,007.5 ml  @ 

1,000 mls/ hr Q5H1M IV  Last administered on 3/24/20at 11:12;  Start 3/23/20 at 

12:00;  Stop 3/24/20 at 11:17;  Status DC


Potassium Chloride 15 meq/ Bicarbonate Dialysis Soln w/ out KCl 5,007.5 ml  @ 

1,000 mls/ hr Q5H1M IV  Last administered on 3/24/20at 11:11;  Start 3/23/20 at 

12:00;  Stop 3/24/20 at 11:19;  Status DC


Sodium Chloride 90 meq/Potassium Chloride 15 meq/ Potassium Phosphate 10 mmol/ 

Magnesium Sulfate 10 meq/Calcium Gluconate 20 meq/ Multivitamins 10 ml/Chromium/

Copper/Manganese/ Seleni/Zn 0.5 ml/ Total Parenteral Nutrition/Amino 

Acids/Dextrose/ Fat Emulsion Intravenous 1,400 ml @  58.333 mls/ hr TPN  CONT IV

 Last administered on 3/23/20at 21:42;  Start 3/23/20 at 22:00;  Stop 3/24/20 at

21:59;  Status DC


Heparin Sodium (Porcine) (Heparin Sodium) 5,000 unit Q8HRS SQ  Last administered

on 3/28/20at 05:55;  Start 3/23/20 at 15:00;  Stop 3/28/20 at 13:28;  Status DC


Meropenem 500 mg/ Sodium Chloride 50 ml @  100 mls/hr Q6HRS IV  Last 

administered on 3/25/20at 06:00;  Start 3/24/20 at 09:00;  Stop 3/25/20 at 

07:29;  Status DC


Potassium Phosphate 20 mmol/ Sodium Chloride 106.6667 ml @  51.667 m... 1X  ONCE

IV  Last administered on 3/24/20at 11:22;  Start 3/24/20 at 10:15;  Stop 3/24/20

at 12:18;  Status DC


Acetaminophen (Tylenol Supp) 650 mg PRN Q6HRS  PRN AL MILD PAIN / TEMP Last 

administered on 4/17/20at 10:25;  Start 3/24/20 at 10:30


Potassium Chloride/Water 100 ml @  100 mls/hr Q1H IV  Last administered on 

3/24/20at 12:12;  Start 3/24/20 at 11:00;  Stop 3/24/20 at 12:59;  Status DC


Potassium Chloride 20 meq/ Bicarbonate Dialysis Soln w/ out KCl 5,010 ml @  

1,000 mls/hr Q5H1M IV  Last administered on 3/25/20at 08:48;  Start 3/24/20 at 

12:00;  Stop 3/25/20 at 13:03;  Status DC


Potassium Chloride 20 meq/ Bicarbonate Dialysis Soln w/ out KCl 5,010 ml @  

1,000 mls/hr Q5H1M IV  Last administered on 3/29/20at 14:52;  Start 3/24/20 at 

11:30;  Stop 3/29/20 at 19:59;  Status DC


Potassium Chloride 20 meq/ Bicarbonate Dialysis Soln w/ out KCl 5,010 ml @  

1,000 mls/hr Q5H1M IV  Last administered on 3/29/20at 14:53;  Start 3/24/20 at 

11:30;  Stop 3/29/20 at 19:59;  Status DC


Sodium Chloride 90 meq/Potassium Chloride 15 meq/ Potassium Phosphate 15 mmol/ 

Magnesium Sulfate 10 meq/Calcium Gluconate 15 meq/ Multivitamins 10 ml/Chromium/

Copper/Manganese/ Seleni/Zn 0.5 ml/ Total Parenteral Nutrition/Amino 

Acids/Dextrose/ Fat Emulsion Intravenous 1,400 ml @  58.333 mls/ hr TPN  CONT IV

 Last administered on 3/24/20at 22:17;  Start 3/24/20 at 22:00;  Stop 3/25/20 at

21:59;  Status DC


Cefepime HCl (Maxipime) 2 gm Q12HR IVP  Last administered on 4/7/20at 20:56;  

Start 3/25/20 at 09:00;  Stop 4/8/20 at 09:58;  Status DC


Daptomycin 500 mg/ Sodium Chloride 50 ml @  100 mls/hr Q48H IV  Last 

administered on 4/10/20at 09:57;  Start 3/25/20 at 08:30;  Stop 4/10/20 at 

10:07;  Status DC


Lidocaine HCl (Buffered Lidocaine 1%) 3 ml 1X  ONCE INJ  Last administered on 

3/25/20at 10:27;  Start 3/25/20 at 10:30;  Stop 3/25/20 at 10:31;  Status DC


Potassium Phosphate 20 mmol/ Sodium Chloride 106.6667 ml @  51.667 m... 1X  ONCE

IV  Last administered on 3/25/20at 12:51;  Start 3/25/20 at 13:00;  Stop 3/25/20

at 15:03;  Status DC


Sodium Chloride 90 meq/Potassium Chloride 15 meq/ Potassium Phosphate 18 mmol/ 

Magnesium Sulfate 8 meq/Calcium Gluconate 15 meq/ Multivitamins 10 ml/Chromium/ 

Copper/Manganese/ Seleni/Zn 0.5 ml/ Total Parenteral Nutrition/Amino 

Acids/Dextrose/ Fat Emulsion Intravenous 1,400 ml @  58.333 mls/ hr TPN  CONT IV

 Last administered on 3/25/20at 22:16;  Start 3/25/20 at 22:00;  Stop 3/26/20 at

21:59;  Status DC


Potassium Chloride 20 meq/ Bicarbonate Dialysis Soln w/ out KCl 5,010 ml @  

1,000 mls/hr Q5H1M IV  Last administered on 3/29/20at 14:54;  Start 3/25/20 at 

16:00;  Stop 3/29/20 at 19:59;  Status DC


Multi-Ingred Cream/Lotion/Oil/ Oint (Artificial Tears Eye Ointment) 1 thomas PRN 

Q1HR  PRN OU DRY EYE, 2nd choice Last administered on 4/13/20at 08:19;  Start 

3/25/20 at 17:30


Sodium Chloride 90 meq/Potassium Chloride 15 meq/ Potassium Phosphate 18 mmol/ 

Magnesium Sulfate 8 meq/Calcium Gluconate 15 meq/ Multivitamins 10 ml/Chromium/ 

Copper/Manganese/ Seleni/Zn 0.5 ml/ Total Parenteral Nutrition/Amino 

Acids/Dextrose/ Fat Emulsion Intravenous 1,400 ml @  58.333 mls/ hr TPN  CONT IV

 Last administered on 3/26/20at 22:00;  Start 3/26/20 at 22:00;  Stop 3/27/20 at

21:59;  Status DC


Albumin Human 500 ml @  125 mls/hr 1X  ONCE IV ;  Start 3/26/20 at 14:15;  Stop 

3/26/20 at 18:14;  Status DC


Sodium Chloride 90 meq/Potassium Chloride 15 meq/ Potassium Phosphate 18 mmol/ 

Magnesium Sulfate 8 meq/Calcium Gluconate 15 meq/ Multivitamins 10 ml/Chromium/ 

Copper/Manganese/ Seleni/Zn 0.5 ml/ Insulin Human Regular 10 unit/ Total 

Parenteral Nutrition/Amino Acids/Dextrose/ Fat Emulsion Intravenous 1,400 ml @  

58.333 mls/ hr TPN  CONT IV  Last administered on 3/27/20at 21:43;  Start 

3/27/20 at 22:00;  Stop 3/28/20 at 21:59;  Status DC


Lidocaine HCl (Buffered Lidocaine 1%) 3 ml STK-MED ONCE .ROUTE ;  Start 3/25/20 

at 10:00;  Stop 3/27/20 at 13:57;  Status DC


Midazolam HCl 100 mg/Sodium Chloride 100 ml @ 7 mls/hr CONT  PRN IV SEE PROTOCOL

Last administered on 4/8/20at 15:35;  Start 3/28/20 at 16:00


Sodium Chloride 90 meq/Potassium Chloride 15 meq/ Potassium Phosphate 18 mmol/ 

Magnesium Sulfate 8 meq/Calcium Gluconate 15 meq/ Multivitamins 10 ml/Chromium/ 

Copper/Manganese/ Seleni/Zn 0.5 ml/ Insulin Human Regular 15 unit/ Total 

Parenteral Nutrition/Amino Acids/Dextrose/ Fat Emulsion Intravenous 1,400 ml @  

58.333 mls/ hr TPN  CONT IV  Last administered on 3/28/20at 20:34;  Start 

3/28/20 at 22:00;  Stop 3/29/20 at 21:59;  Status DC


Info (Icu Electrolyte Protocol) 1 ea CONT PRN  PRN MC PER PROTOCOL;  Start 

3/29/20 at 13:15


Sodium Chloride 90 meq/Potassium Chloride 15 meq/ Potassium Phosphate 18 mmol/ 

Magnesium Sulfate 8 meq/Calcium Gluconate 15 meq/ Multivitamins 10 ml/Chromium/ 

Copper/Manganese/ Seleni/Zn 0.5 ml/ Insulin Human Regular 15 unit/ Total 

Parenteral Nutrition/Amino Acids/Dextrose/ Fat Emulsion Intravenous 1,400 ml @  

58.333 mls/ hr TPN  CONT IV  Last administered on 3/29/20at 22:05;  Start 

3/29/20 at 22:00;  Stop 3/30/20 at 21:59;  Status DC


Potassium Chloride 15 meq/ Bicarbonate Dialysis Soln w/ out KCl 5,007.5 ml  @ 

1,000 mls/ hr Q5H1M IV  Last administered on 4/1/20at 18:14;  Start 3/29/20 at 

20:00;  Stop 4/2/20 at 13:08;  Status DC


Potassium Chloride 15 meq/ Bicarbonate Dialysis Soln w/ out KCl 5,007.5 ml  @ 

1,000 mls/ hr Q5H1M IV  Last administered on 4/1/20at 18:14;  Start 3/29/20 at 

20:00;  Stop 4/2/20 at 13:08;  Status DC


Potassium Chloride 15 meq/ Bicarbonate Dialysis Soln w/ out KCl 5,007.5 ml  @ 

1,000 mls/ hr Q5H1M IV  Last administered on 4/1/20at 18:14;  Start 3/29/20 at 

20:00;  Stop 4/2/20 at 13:08;  Status DC


Iohexol (Omnipaque 240 Mg/ml) 30 ml 1X  ONCE PO  Last administered on 3/30/20at 

11:30;  Start 3/30/20 at 11:30;  Stop 3/30/20 at 11:33;  Status DC


Info (CONTRAST GIVEN -- Rx MONITORING) 1 each PRN DAILY  PRN MC SEE COMMENTS;  

Start 3/30/20 at 11:45;  Stop 4/1/20 at 11:44;  Status DC


Sodium Chloride 90 meq/Potassium Chloride 15 meq/ Potassium Phosphate 18 mmol/ 

Magnesium Sulfate 8 meq/Calcium Gluconate 15 meq/ Multivitamins 10 ml/Chromium/ 

Copper/Manganese/ Seleni/Zn 0.5 ml/ Insulin Human Regular 15 unit/ Total 

Parenteral Nutrition/Amino Acids/Dextrose/ Fat Emulsion Intravenous 1,400 ml @  

58.333 mls/ hr TPN  CONT IV  Last administered on 3/30/20at 21:47;  Start 

3/30/20 at 22:00;  Stop 3/31/20 at 21:59;  Status DC


Sodium Chloride 90 meq/Potassium Chloride 15 meq/ Potassium Phosphate 18 mmol/ 

Magnesium Sulfate 8 meq/Calcium Gluconate 15 meq/ Multivitamins 10 ml/Chromium/ 

Copper/Manganese/ Seleni/Zn 0.5 ml/ Insulin Human Regular 20 unit/ Total 

Parenteral Nutrition/Amino Acids/Dextrose/ Fat Emulsion Intravenous 1,400 ml @  

58.333 mls/ hr TPN  CONT IV  Last administered on 3/31/20at 21:36;  Start 

3/31/20 at 22:00;  Stop 4/1/20 at 21:59;  Status DC


Alteplase, Recombinant (Cathflo For Central Catheter Clearance) 1 mg 1X  ONCE 

INT CAT  Last administered on 3/31/20at 20:03;  Start 3/31/20 at 19:30;  Stop 

3/31/20 at 19:46;  Status DC


Alteplase, Recombinant (Cathflo For Central Catheter Clearance) 1 mg 1X  ONCE 

INT CAT  Last administered on 3/31/20at 22:05;  Start 3/31/20 at 22:00;  Stop 

3/31/20 at 22:01;  Status DC


Sodium Chloride 90 meq/Potassium Chloride 15 meq/ Potassium Phosphate 18 mmol/ 

Magnesium Sulfate 8 meq/Calcium Gluconate 15 meq/ Multivitamins 10 ml/Chromium/ 

Copper/Manganese/ Seleni/Zn 0.5 ml/ Insulin Human Regular 20 unit/ Total 

Parenteral Nutrition/Amino Acids/Dextrose/ Fat Emulsion Intravenous 1,400 ml @  

58.333 mls/ hr TPN  CONT IV  Last administered on 4/1/20at 21:30;  Start 4/1/20 

at 22:00;  Stop 4/2/20 at 21:59;  Status DC


Dexmedetomidine HCl 400 mcg/ Sodium Chloride 100 ml @ 0 mls/hr CONT  PRN IV 

ANXIETY / AGITATION Last administered on 4/18/20at 11:16;  Start 4/2/20 at 08:15


Sodium Chloride 500 ml @  500 mls/hr 1X PRN  PRN IV ELEVATED BP, SEE COMMENTS;  

Start 4/2/20 at 08:15


Atropine Sulfate (ATROPINE 0.5mg SYRINGE) 0.5 mg PRN Q5MIN  PRN IV SEE COMMENTS;

 Start 4/2/20 at 08:15


Furosemide (Lasix) 20 mg 1X  ONCE IVP  Last administered on 4/2/20at 08:19;  

Start 4/2/20 at 08:15;  Stop 4/2/20 at 08:16;  Status DC


Lidocaine HCl (Buffered Lidocaine 1%) 3 ml STK-MED ONCE .ROUTE ;  Start 4/2/20 

at 08:39;  Stop 4/2/20 at 08:39;  Status DC


Lidocaine HCl (Buffered Lidocaine 1%) 6 ml 1X  ONCE INJ  Last administered on 

4/2/20at 09:05;  Start 4/2/20 at 09:00;  Stop 4/2/20 at 09:06;  Status DC


Sodium Chloride 90 meq/Potassium Chloride 15 meq/ Potassium Phosphate 18 mmol/ 

Magnesium Sulfate 8 meq/Calcium Gluconate 15 meq/ Multivitamins 10 ml/Chromium/ 

Copper/Manganese/ Seleni/Zn 0.5 ml/ Insulin Human Regular 20 unit/ Total 

Parenteral Nutrition/Amino Acids/Dextrose/ Fat Emulsion Intravenous 1,400 ml @  

58.333 mls/ hr TPN  CONT IV  Last administered on 4/2/20at 22:45;  Start 4/2/20 

at 22:00;  Stop 4/3/20 at 21:59;  Status DC


Sodium Chloride 1,000 ml @  1,000 mls/hr Q1H PRN IV hypotension;  Start 4/3/20 

at 07:30;  Stop 4/3/20 at 13:29;  Status DC


Albumin Human 200 ml @  200 mls/hr 1X PRN  PRN IV Hypotension Last administered 

on 4/3/20at 09:36;  Start 4/3/20 at 07:30;  Stop 4/3/20 at 13:29;  Status DC


Sodium Chloride (Normal Saline Flush) 10 ml 1X PRN  PRN IV AP catheter pack;  

Start 4/3/20 at 07:30;  Stop 4/3/20 at 21:29;  Status DC


Sodium Chloride (Normal Saline Flush) 10 ml 1X PRN  PRN IV  catheter pack;  

Start 4/3/20 at 07:30;  Stop 4/4/20 at 07:29;  Status DC


Sodium Chloride 1,000 ml @  400 mls/hr Q2H30M PRN IV PATENCY;  Start 4/3/20 at 

07:30;  Stop 4/3/20 at 19:29;  Status DC


Info (PHARMACY MONITORING -- do not chart) 1 each PRN DAILY  PRN MC SEE 

COMMENTS;  Start 4/3/20 at 07:30;  Stop 4/3/20 at 13:02;  Status DC


Info (PHARMACY MONITORING -- do not chart) 1 each PRN DAILY  PRN MC SEE 

COMMENTS;  Start 4/3/20 at 07:30;  Stop 4/5/20 at 12:45;  Status DC


Sodium Chloride 90 meq/Potassium Chloride 15 meq/ Potassium Phosphate 10 mmol/ 

Magnesium Sulfate 8 meq/Calcium Gluconate 15 meq/ Multivitamins 10 ml/Chromium/ 

Copper/Manganese/ Seleni/Zn 0.5 ml/ Insulin Human Regular 25 unit/ Total 

Parenteral Nutrition/Amino Acids/Dextrose/ Fat Emulsion Intravenous 1,400 ml @  

58.333 mls/ hr TPN  CONT IV  Last administered on 4/3/20at 22:19;  Start 4/3/20 

at 22:00;  Stop 4/4/20 at 21:59;  Status DC


Heparin Sodium (Porcine) (Heparin Sodium) 5,000 unit Q12HR SQ  Last administered

on 4/17/20at 22:09;  Start 4/3/20 at 21:00


Ondansetron HCl (Zofran) 4 mg PRN Q6HRS  PRN IV NAUSEA/VOMITING;  Start 4/6/20 

at 07:00;  Stop 4/7/20 at 06:59;  Status DC


Fentanyl Citrate (Fentanyl 2ml Vial) 25 mcg PRN Q5MIN  PRN IV MILD PAIN 1-3;  

Start 4/6/20 at 07:00;  Stop 4/7/20 at 06:59;  Status DC


Fentanyl Citrate (Fentanyl 2ml Vial) 50 mcg PRN Q5MIN  PRN IV MODERATE TO SEVERE

PAIN;  Start 4/6/20 at 07:00;  Stop 4/7/20 at 06:59;  Status DC


Ringer's Solution 1,000 ml @  30 mls/hr Q24H IV ;  Start 4/6/20 at 07:00;  Stop 

4/6/20 at 18:59;  Status DC


Lidocaine HCl (Xylocaine-Mpf 1% 2ml Vial) 2 ml PRN 1X  PRN ID PRIOR TO IV START;

 Start 4/6/20 at 07:00;  Stop 4/7/20 at 06:59;  Status DC


Prochlorperazine Edisylate (Compazine) 5 mg PACU PRN  PRN IV NAUSEA, MRX1;  

Start 4/6/20 at 07:00;  Stop 4/7/20 at 06:59;  Status DC


Sodium Chloride 1,000 ml @  1,000 mls/hr Q1H PRN IV hypotension;  Start 4/4/20 

at 09:10;  Stop 4/4/20 at 15:09;  Status DC


Albumin Human 200 ml @  200 mls/hr 1X PRN  PRN IV Hypotension Last administered 

on 4/4/20at 10:10;  Start 4/4/20 at 09:15;  Stop 4/4/20 at 15:14;  Status DC


Sodium Chloride 1,000 ml @  400 mls/hr Q2H30M PRN IV PATENCY;  Start 4/4/20 at 

09:10;  Stop 4/4/20 at 21:09;  Status DC


Info (PHARMACY MONITORING -- do not chart) 1 each PRN DAILY  PRN MC SEE 

COMMENTS;  Start 4/4/20 at 09:15;  Stop 4/5/20 at 12:45;  Status DC


Info (PHARMACY MONITORING -- do not chart) 1 each PRN DAILY  PRN MC SEE 

COMMENTS;  Start 4/4/20 at 09:15;  Stop 4/5/20 at 12:45;  Status DC


Sodium Chloride 90 meq/Potassium Chloride 15 meq/ Potassium Phosphate 10 mmol/ 

Magnesium Sulfate 8 meq/Calcium Gluconate 15 meq/ Multivitamins 10 ml/Chromium/ 

Copper/Manganese/ Seleni/Zn 0.5 ml/ Insulin Human Regular 25 unit/ Total 

Parenteral Nutrition/Amino Acids/Dextrose/ Fat Emulsion Intravenous 1,400 ml @  

58.333 mls/ hr TPN  CONT IV  Last administered on 4/4/20at 22:10;  Start 4/4/20 

at 22:00;  Stop 4/5/20 at 21:59;  Status DC


Magnesium Sulfate 50 ml @ 25 mls/hr PRN DAILY  PRN IV for Mag < 1.7 on am labs; 

Start 4/5/20 at 09:15


Sodium Chloride 90 meq/Potassium Chloride 15 meq/ Potassium Phosphate 10 mmol/ 

Magnesium Sulfate 8 meq/Calcium Gluconate 15 meq/ Multivitamins 10 ml/Chromium/ 

Copper/Manganese/ Seleni/Zn 0.5 ml/ Insulin Human Regular 25 unit/ Total 

Parenteral Nutrition/Amino Acids/Dextrose/ Fat Emulsion Intravenous 1,400 ml @  

58.333 mls/ hr TPN  CONT IV  Last administered on 4/5/20at 21:20;  Start 4/5/20 

at 22:00;  Stop 4/6/20 at 21:59;  Status DC


Sodium Chloride 1,000 ml @  1,000 mls/hr Q1H PRN IV hypotension;  Start 4/5/20 

at 12:23;  Stop 4/5/20 at 18:22;  Status DC


Albumin Human 200 ml @  200 mls/hr 1X  ONCE IV  Last administered on 4/5/20at 

13:34;  Start 4/5/20 at 12:30;  Stop 4/5/20 at 13:29;  Status DC


Diphenhydramine HCl (Benadryl) 25 mg 1X PRN  PRN IV ITCHING;  Start 4/5/20 at 

12:30;  Stop 4/6/20 at 12:29;  Status DC


Diphenhydramine HCl (Benadryl) 25 mg 1X PRN  PRN IV ITCHING;  Start 4/5/20 at 

12:30;  Stop 4/6/20 at 12:29;  Status DC


Info (PHARMACY MONITORING -- do not chart) 1 each PRN DAILY  PRN MC SEE COMMENTS

;  Start 4/5/20 at 12:30;  Status Cancel


Bupivacaine HCl/ Epinephrine Bitart (Sensorcain-Epi 0.5%-1:[id number] Mpf) 30 ml 

STK-MED ONCE .ROUTE  Last administered on 4/6/20at 11:44;  Start 4/6/20 at 

11:00;  Stop 4/6/20 at 11:01;  Status DC


Cellulose (Surgicel Fibrillar 1x2) 1 each STK-MED ONCE .ROUTE ;  Start 4/6/20 at

11:00;  Stop 4/6/20 at 11:01;  Status DC


Sodium Chloride 90 meq/Potassium Chloride 15 meq/ Potassium Phosphate 10 mmol/ 

Magnesium Sulfate 12 meq/Calcium Gluconate 15 meq/ Multivitamins 10 ml/Chromium/

Copper/Manganese/ Seleni/Zn 0.5 ml/ Insulin Human Regular 25 unit/ Total 

Parenteral Nutrition/Amino Acids/Dextrose/ Fat Emulsion Intravenous 1,400 ml @  

58.333 mls/ hr TPN  CONT IV  Last administered on 4/6/20at 22:24;  Start 4/6/20 

at 22:00;  Stop 4/7/20 at 21:59;  Status DC


Propofol 20 ml @ As Directed STK-MED ONCE IV ;  Start 4/6/20 at 11:07;  Stop 

4/6/20 at 11:07;  Status DC


Cellulose (Surgicel Hemostat 4x8) 1 each STK-MED ONCE .ROUTE  Last administered 

on 4/6/20at 11:44;  Start 4/6/20 at 11:55;  Stop 4/6/20 at 11:56;  Status DC


Sevoflurane (Ultane) 60 ml STK-MED ONCE IH ;  Start 4/6/20 at 12:46;  Stop 

4/6/20 at 12:46;  Status DC


Sodium Chloride 1,000 ml @  1,000 mls/hr Q1H PRN IV hypotension;  Start 4/6/20 

at 13:51;  Stop 4/6/20 at 19:50;  Status DC


Albumin Human 200 ml @  200 mls/hr 1X PRN  PRN IV Hypotension Last administered 

on 4/6/20at 14:51;  Start 4/6/20 at 14:00;  Stop 4/6/20 at 19:59;  Status DC


Diphenhydramine HCl (Benadryl) 25 mg 1X PRN  PRN IV ITCHING;  Start 4/6/20 at 

14:00;  Stop 4/7/20 at 13:59;  Status DC


Diphenhydramine HCl (Benadryl) 25 mg 1X PRN  PRN IV ITCHING;  Start 4/6/20 at 

14:00;  Stop 4/7/20 at 13:59;  Status DC


Sodium Chloride 1,000 ml @  400 mls/hr Q2H30M PRN IV PATENCY;  Start 4/6/20 at 

13:51;  Stop 4/7/20 at 01:50;  Status DC


Info (PHARMACY MONITORING -- do not chart) 1 each PRN DAILY  PRN MC SEE 

COMMENTS;  Start 4/6/20 at 14:00;  Stop 4/9/20 at 08:16;  Status DC


Heparin Sodium (Porcine) (Hep Lock Adult) 500 unit STK-MED ONCE IVP ;  Start 

4/7/20 at 09:29;  Stop 4/7/20 at 09:30;  Status DC


Sodium Chloride 1,000 ml @  1,000 mls/hr Q1H PRN IV hypotension;  Start 4/7/20 

at 10:43;  Stop 4/7/20 at 16:42;  Status DC


Sodium Chloride 1,000 ml @  400 mls/hr Q2H30M PRN IV PATENCY;  Start 4/7/20 at 

10:43;  Stop 4/7/20 at 22:42;  Status DC


Info (PHARMACY MONITORING -- do not chart) 1 each PRN DAILY  PRN MC SEE 

COMMENTS;  Start 4/7/20 at 10:45;  Status UNV


Info (PHARMACY MONITORING -- do not chart) 1 each PRN DAILY  PRN MC SEE 

COMMENTS;  Start 4/7/20 at 10:45;  Status UNV


Sodium Chloride 90 meq/Potassium Chloride 15 meq/ Magnesium Sulfate 12 

meq/Calcium Gluconate 15 meq/ Multivitamins 10 ml/Chromium/ Copper/Manganese/ 

Seleni/Zn 0.5 ml/ Insulin Human Regular 25 unit/ Total Parenteral 

Nutrition/Amino Acids/Dextrose/ Fat Emulsion Intravenous 1,400 ml @  58.333 mls/

hr TPN  CONT IV  Last administered on 4/7/20at 22:13;  Start 4/7/20 at 22:00;  

Stop 4/8/20 at 21:59;  Status DC


Sodium Chloride 1,000 ml @  1,000 mls/hr Q1H PRN IV hypotension;  Start 4/8/20 

at 07:50;  Stop 4/8/20 at 13:49;  Status DC


Albumin Human 200 ml @  200 mls/hr 1X  ONCE IV ;  Start 4/8/20 at 08:00;  Stop 

4/8/20 at 08:53;  Status DC


Diphenhydramine HCl (Benadryl) 25 mg 1X PRN  PRN IV ITCHING;  Start 4/8/20 at 

08:00;  Stop 4/9/20 at 07:59;  Status DC


Diphenhydramine HCl (Benadryl) 25 mg 1X PRN  PRN IV ITCHING;  Start 4/8/20 at 

08:00;  Stop 4/9/20 at 07:59;  Status DC


Info (PHARMACY MONITORING -- do not chart) 1 each PRN DAILY  PRN MC SEE 

COMMENTS;  Start 4/8/20 at 08:00;  Stop 4/9/20 at 08:16;  Status DC


Albumin Human 50 ml @ 50 mls/hr 1X  ONCE IV ;  Start 4/8/20 at 08:53;  Stop 

4/8/20 at 08:56;  Status DC


Albumin Human 200 ml @  50 mls/hr PRN 1X  PRN IV HYPOTENSION Last administered 

on 4/14/20at 11:54;  Start 4/8/20 at 09:00


Meropenem 500 mg/ Sodium Chloride 50 ml @  100 mls/hr Q12H IV  Last administered

on 4/17/20at 22:09;  Start 4/8/20 at 10:00


Sodium Chloride 90 meq/Magnesium Sulfate 12 meq/ Calcium Gluconate 15 meq/ 

Multivitamins 10 ml/Chromium/ Copper/Manganese/ Seleni/Zn 0.5 ml/ Insulin Human 

Regular 25 unit/ Total Parenteral Nutrition/Amino Acids/Dextrose/ Fat Emulsion 

Intravenous 1,400 ml @  58.333 mls/ hr TPN  CONT IV  Last administered on 

4/8/20at 21:41;  Start 4/8/20 at 22:00;  Stop 4/9/20 at 21:59;  Status DC


Sodium Chloride 1,000 ml @  1,000 mls/hr Q1H PRN IV hypotension;  Start 4/9/20 

at 07:58;  Stop 4/9/20 at 13:57;  Status DC


Albumin Human 200 ml @  200 mls/hr 1X PRN  PRN IV Hypotension Last administered 

on 4/9/20at 09:30;  Start 4/9/20 at 08:00;  Stop 4/9/20 at 13:59;  Status DC


Sodium Chloride 1,000 ml @  400 mls/hr Q2H30M PRN IV PATENCY;  Start 4/9/20 at 

07:58;  Stop 4/9/20 at 19:57;  Status DC


Info (PHARMACY MONITORING -- do not chart) 1 each PRN DAILY  PRN MC SEE 

COMMENTS;  Start 4/9/20 at 08:00;  Status Cancel


Info (PHARMACY MONITORING -- do not chart) 1 each PRN DAILY  PRN MC SEE 

COMMENTS;  Start 4/9/20 at 08:15;  Status UNV


Sodium Chloride 90 meq/Potassium Phosphate 5 mmol/ Magnesium Sulfate 12 

meq/Calcium Gluconate 15 meq/ Multivitamins 10 ml/Chromium/ Copper/Manganese/ 

Seleni/Zn 0.5 ml/ Insulin Human Regular 30 unit/ Total Parenteral 

Nutrition/Amino Acids/Dextrose/ Fat Emulsion Intravenous 1,400 ml @  58.333 mls/

hr TPN  CONT IV  Last administered on 4/9/20at 22:08;  Start 4/9/20 at 22:00;  

Stop 4/10/20 at 21:59;  Status DC


Linezolid/Dextrose 300 ml @  300 mls/hr Q12HR IV  Last administered on 4/17/20at

20:46;  Start 4/10/20 at 11:00


Sodium Chloride 90 meq/Potassium Phosphate 15 mmol/ Magnesium Sulfate 12 

meq/Calcium Gluconate 15 meq/ Multivitamins 10 ml/Chromium/ Copper/Manganese/ 

Seleni/Zn 0.5 ml/ Insulin Human Regular 30 unit/ Total Parenteral 

Nutrition/Amino Acids/Dextrose/ Fat Emulsion Intravenous 1,400 ml @  58.333 mls/

hr TPN  CONT IV  Last administered on 4/10/20at 21:49;  Start 4/10/20 at 22:00; 

Stop 4/11/20 at 21:59;  Status DC


Sodium Chloride 90 meq/Potassium Phosphate 15 mmol/ Magnesium Sulfate 12 

meq/Calcium Gluconate 15 meq/ Multivitamins 10 ml/Chromium/ Copper/Manganese/ 

Seleni/Zn 0.5 ml/ Insulin Human Regular 40 unit/ Total Parenteral 

Nutrition/Amino Acids/Dextrose/ Fat Emulsion Intravenous 1,400 ml @  58.333 mls/

hr TPN  CONT IV  Last administered on 4/11/20at 21:21;  Start 4/11/20 at 22:00; 

Stop 4/12/20 at 21:59;  Status DC


Sodium Chloride 1,000 ml @  1,000 mls/hr Q1H PRN IV hypotension;  Start 4/11/20 

at 13:26;  Stop 4/11/20 at 19:25;  Status DC


Albumin Human 200 ml @  200 mls/hr 1X PRN  PRN IV Hypotension Last administered 

on 4/11/20at 15:00;  Start 4/11/20 at 13:30;  Stop 4/11/20 at 19:29;  Status DC


Sodium Chloride (Normal Saline Flush) 10 ml 1X PRN  PRN IV AP catheter pack;  

Start 4/11/20 at 13:30;  Stop 4/12/20 at 13:29;  Status DC


Sodium Chloride (Normal Saline Flush) 10 ml 1X PRN  PRN IV  catheter pack;  

Start 4/11/20 at 13:30;  Stop 4/12/20 at 13:29;  Status DC


Sodium Chloride 1,000 ml @  400 mls/hr Q2H30M PRN IV PATENCY;  Start 4/11/20 at 

13:26;  Stop 4/12/20 at 01:25;  Status DC


Info (PHARMACY MONITORING -- do not chart) 1 each PRN DAILY  PRN MC SEE 

COMMENTS;  Start 4/11/20 at 13:30;  Stop 4/11/20 at 13:33;  Status DC


Info (PHARMACY MONITORING -- do not chart) 1 each PRN DAILY  PRN MC SEE 

COMMENTS;  Start 4/11/20 at 13:30;  Stop 4/11/20 at 13:34;  Status DC


Sodium Chloride 90 meq/Potassium Phosphate 19 mmol/ Magnesium Sulfate 12 

meq/Calcium Gluconate 15 meq/ Multivitamins 10 ml/Chromium/ Copper/Manganese/ 

Seleni/Zn 0.5 ml/ Insulin Human Regular 40 unit/ Total Parenteral 

Nutrition/Amino Acids/Dextrose/ Fat Emulsion Intravenous 1,400 ml @  58.333 mls/

hr TPN  CONT IV  Last administered on 4/12/20at 21:54;  Start 4/12/20 at 22:00; 

Stop 4/13/20 at 21:59;  Status DC


Sodium Chloride 1,000 ml @  1,000 mls/hr Q1H PRN IV hypotension;  Start 4/13/20 

at 09:35;  Stop 4/13/20 at 15:34;  Status DC


Albumin Human 200 ml @  200 mls/hr 1X PRN  PRN IV Hypotension;  Start 4/13/20 at

09:45;  Stop 4/13/20 at 15:44;  Status DC


Diphenhydramine HCl (Benadryl) 25 mg 1X PRN  PRN IV ITCHING;  Start 4/13/20 at 

09:45;  Stop 4/14/20 at 09:44;  Status DC


Diphenhydramine HCl (Benadryl) 25 mg 1X PRN  PRN IV ITCHING;  Start 4/13/20 at 

09:45;  Stop 4/14/20 at 09:44;  Status DC


Sodium Chloride 1,000 ml @  400 mls/hr Q2H30M PRN IV PATENCY;  Start 4/13/20 at 

09:35;  Stop 4/13/20 at 21:34;  Status DC


Info (PHARMACY MONITORING -- do not chart) 1 each PRN DAILY  PRN MC SEE 

COMMENTS;  Start 4/13/20 at 09:45;  Status Cancel


Sodium Chloride 100 meq/Potassium Phosphate 19 mmol/ Magnesium Sulfate 12 

meq/Calcium Gluconate 15 meq/ Multivitamins 10 ml/Chromium/ Copper/Manganese/ 

Seleni/Zn 0.5 ml/ Insulin Human Regular 40 unit/ Potassium Chloride 20 meq/ 

Total Parenteral Nutrition/Amino Acids/Dextrose/ Fat Emulsion Intravenous 1,400 

ml @  58.333 mls/ hr TPN  CONT IV  Last administered on 4/13/20at 22:02;  Start 

4/13/20 at 22:00;  Stop 4/14/20 at 21:59;  Status DC


Furosemide (Lasix) 40 mg 1X  ONCE IVP  Last administered on 4/13/20at 14:39;  

Start 4/13/20 at 14:30;  Stop 4/13/20 at 14:31;  Status DC


Metronidazole 100 ml @  100 mls/hr Q8HRS IV  Last administered on 4/18/20at 

06:21;  Start 4/14/20 at 10:00


Sodium Chloride 1,000 ml @  1,000 mls/hr Q1H PRN IV hypotension;  Start 4/14/20 

at 08:00;  Stop 4/14/20 at 13:59;  Status DC


Albumin Human 200 ml @  200 mls/hr 1X PRN  PRN IV Hypotension;  Start 4/14/20 at

08:00;  Stop 4/14/20 at 13:59;  Status DC


Sodium Chloride 1,000 ml @  400 mls/hr Q2H30M PRN IV PATENCY;  Start 4/14/20 at 

08:00;  Stop 4/14/20 at 19:59;  Status DC


Info (PHARMACY MONITORING -- do not chart) 1 each PRN DAILY  PRN MC SEE 

COMMENTS;  Start 4/14/20 at 11:30;  Status UNV


Info (PHARMACY MONITORING -- do not chart) 1 each PRN DAILY  PRN MC SEE 

COMMENTS;  Start 4/14/20 at 11:30;  Stop 4/16/20 at 12:13;  Status DC


Sodium Chloride 100 meq/Potassium Phosphate 19 mmol/ Magnesium Sulfate 12 

meq/Calcium Gluconate 15 meq/ Multivitamins 10 ml/Chromium/ Copper/Manganese/ 

Seleni/Zn 0.5 ml/ Insulin Human Regular 40 unit/ Potassium Chloride 20 meq/ 

Total Parenteral Nutrition/Amino Acids/Dextrose/ Fat Emulsion Intravenous 1,400 

ml @  58.333 mls/ hr TPN  CONT IV  Last administered on 4/14/20at 21:52;  Start 

4/14/20 at 22:00;  Stop 4/15/20 at 21:59;  Status DC


Sodium Chloride (Normal Saline Flush) 10 ml QSHIFT  PRN IV AFTER MEDS AND BLOOD 

DRAWS;  Start 4/14/20 at 15:00


Sodium Chloride (Normal Saline Flush) 10 ml PRN Q5MIN  PRN IV AFTER MEDS AND 

BLOOD DRAWS;  Start 4/14/20 at 15:00


Sodium Chloride (Normal Saline Flush) 20 ml PRN Q5MIN  PRN IV AFTER MEDS AND 

BLOOD DRAWS;  Start 4/14/20 at 15:00


Sodium Chloride 100 meq/Potassium Phosphate 19 mmol/ Magnesium Sulfate 12 

meq/Calcium Gluconate 15 meq/ Multivitamins 10 ml/Chromium/ Copper/Manganese/ 

Seleni/Zn 0.5 ml/ Insulin Human Regular 40 unit/ Potassium Chloride 20 meq/ 

Total Parenteral Nutrition/Amino Acids/Dextrose/ Fat Emulsion Intravenous 1,400 

ml @  58.333 mls/ hr TPN  CONT IV  Last administered on 4/15/20at 21:20;  Start 

4/15/20 at 22:00;  Stop 4/16/20 at 21:59;  Status DC


Lidocaine HCl (Buffered Lidocaine 1%) 3 ml STK-MED ONCE .ROUTE ;  Start 4/15/20 

at 13:16;  Stop 4/15/20 at 13:16;  Status DC


Lidocaine HCl (Buffered Lidocaine 1%) 6 ml 1X  ONCE INJ  Last administered on 

4/15/20at 13:45;  Start 4/15/20 at 13:30;  Stop 4/15/20 at 13:31;  Status DC


Albumin Human 100 ml @  100 mls/hr 1X  ONCE IV  Last administered on 4/15/20at 

15:41;  Start 4/15/20 at 15:00;  Stop 4/15/20 at 15:59;  Status DC


Albumin Human 50 ml @ 50 mls/hr 1X  ONCE IV  Last administered on 4/15/20at 

15:00;  Start 4/15/20 at 15:00;  Stop 4/15/20 at 15:59;  Status DC


Info (PHARMACY MONITORING -- do not chart) 1 each PRN DAILY  PRN MC SEE 

COMMENTS;  Start 4/16/20 at 11:30;  Status Cancel


Info (PHARMACY MONITORING -- do not chart) 1 each PRN DAILY  PRN MC SEE 

COMMENTS;  Start 4/16/20 at 11:30;  Status UNV


Sodium Chloride 100 meq/Potassium Phosphate 10 mmol/ Magnesium Sulfate 12 

meq/Calcium Gluconate 15 meq/ Multivitamins 10 ml/Chromium/ Copper/Manganese/ 

Seleni/Zn 0.5 ml/ Insulin Human Regular 35 unit/ Potassium Chloride 20 meq/ 

Total Parenteral Nutrition/Amino Acids/Dextrose/ Fat Emulsion Intravenous 1,400 

ml @  58.333 mls/ hr TPN  CONT IV  Last administered on 4/16/20at 22:10;  Start 

4/16/20 at 22:00;  Stop 4/17/20 at 21:59;  Status DC


Sodium Chloride 100 meq/Potassium Phosphate 5 mmol/ Magnesium Sulfate 12 

meq/Calcium Gluconate 15 meq/ Multivitamins 10 ml/Chromium/ Copper/Manganese/ 

Seleni/Zn 0.5 ml/ Insulin Human Regular 35 unit/ Potassium Chloride 20 meq/ 

Total Parenteral Nutrition/Amino Acids/Dextrose/ Fat Emulsion Intravenous 1,400 

ml @  58.333 mls/ hr TPN  CONT IV  Last administered on 4/17/20at 22:59;  Start 

4/17/20 at 22:00;  Stop 4/18/20 at 21:59


Sodium Chloride 1,000 ml @  1,000 mls/hr Q1H PRN IV hypotension;  Start 4/18/20 

at 08:27;  Stop 4/18/20 at 14:26


Albumin Human 200 ml @  200 mls/hr 1X PRN  PRN IV Hypotension Last administered 

on 4/18/20at 09:18;  Start 4/18/20 at 08:30;  Stop 4/18/20 at 14:29


Sodium Chloride 1,000 ml @  400 mls/hr Q2H30M PRN IV PATENCY;  Start 4/18/20 at 

08:27;  Stop 4/18/20 at 20:26


Info (PHARMACY MONITORING -- do not chart) 1 each PRN DAILY  PRN MC SEE COMMENT

S;  Start 4/18/20 at 08:30;  Status Cancel


Info (PHARMACY MONITORING -- do not chart) 1 each PRN DAILY  PRN MC SEE 

COMMENTS;  Start 4/18/20 at 08:30


Sodium Chloride 100 meq/Potassium Chloride 40 meq/ Magnesium Sulfate 15 

meq/Calcium Gluconate 15 meq/ Multivitamins 10 ml/Chromium/ Copper/Manganese/ 

Seleni/Zn 0.5 ml/ Insulin Human Regular 35 unit/ Total Parenteral 

Nutrition/Amino Acids/Dextrose/ Fat Emulsion Intravenous 1,400 ml @  58.333 mls/

hr TPN  CONT IV ;  Start 4/18/20 at 22:00;  Stop 4/19/20 at 21:59





Active Scripts


Active


Reported


Bisoprolol Fumarate 5 Mg Tablet 10 Mg PO DAILY


Vitals/I & O





Vital Sign - Last 24 Hours








 4/17/20 4/17/20 4/17/20 4/17/20





 14:00 15:00 15:41 16:00


 


Temp    99.2





    99.2


 


Pulse 120 108  126


 


Resp 32 27  39


 


B/P (MAP) 158/101 (120) 185/102 (129)  200/109 (139)


 


Pulse Ox 99 99 100 100


 


O2 Delivery C-pap trial C-pap trial Ventilator C-pap trial


 


    





    





 4/17/20 4/17/20 4/17/20 4/17/20





 16:00 17:00 18:00 18:40


 


Pulse  108 110 


 


Resp  27 32 


 


B/P (MAP)  159/84 (109) 175/106 (129) 


 


Pulse Ox  99 99 100


 


O2 Delivery Mechanical Ventilator C-pap trial C-pap trial Ventilator





 4/17/20 4/17/20 4/17/20 4/17/20





 19:00 20:00 20:00 20:10


 


Temp   99.8 





   99.8 


 


Pulse 92  117 


 


Resp 18  24 


 


B/P (MAP) 162/92 (115)  194/108 (136) 


 


Pulse Ox 100  100 100


 


O2 Delivery Ventilator Mechanical Ventilator Ventilator Ventilator


 


    





    





 4/17/20 4/17/20 4/17/20 4/17/20





 20:50 21:00 22:00 23:00


 


Pulse  128 111 91


 


Resp  30 20 19


 


B/P (MAP)  181/91 (121) 173/92 (119) 139/65 (89)


 


Pulse Ox 100 100 100 100


 


O2 Delivery  Ventilator Ventilator Ventilator





 4/18/20 4/18/20 4/18/20 4/18/20





 00:00 00:00 00:30 01:00


 


Temp 98.9   





 98.9   


 


Pulse 82   86


 


Resp 20   18


 


B/P (MAP) 130/63 (85)   133/69 (90)


 


Pulse Ox 100  100 100


 


O2 Delivery Ventilator Mechanical Ventilator Ventilator Ventilator


 


    





    





 4/18/20 4/18/20 4/18/20 4/18/20





 02:00 03:00 04:00 04:00


 


Temp   99.5 





   99.5 


 


Pulse 114 105 102 


 


Resp 18 28 28 


 


B/P (MAP) 144/81 (102) 153/91 (111) 160/81 (107) 


 


Pulse Ox 100 100 100 


 


O2 Delivery Ventilator Ventilator Ventilator Mechanical Ventilator


 


    





    





 4/18/20 4/18/20 4/18/20 4/18/20





 04:15 05:00 06:00 07:00


 


Pulse  88 90 86


 


Resp  22 18 19


 


B/P (MAP)  170/96 (120) 136/71 (92) 133/69 (90)


 


Pulse Ox 100 100 100 100


 


O2 Delivery Ventilator Ventilator Ventilator Ventilator





 4/18/20 4/18/20 4/18/20 4/18/20





 08:00 08:00 08:02 09:00


 


Pulse  100  154


 


Resp  25  26


 


B/P (MAP)  143/76 (98)  171/80 (110)


 


Pulse Ox  100 100 100


 


O2 Delivery Mechanical Ventilator Ventilator Ventilator Ventilator





 4/18/20 4/18/20 4/18/20 4/18/20





 10:00 11:00 11:18 12:00


 


Temp    99.4





    99.4


 


Pulse 166 146  97


 


Resp 30 24  20


 


B/P (MAP) 185/98 (127) 167/80 (109)  117/78 (91)


 


Pulse Ox 100 100 99 100


 


O2 Delivery Ventilator Ventilator Trilogy Ventilator














Intake and Output   


 


 4/17/20 4/17/20 4/18/20





 15:00 23:00 07:00


 


Intake Total 450 ml 2344.2 ml 1313.96 ml


 


Output Total 470 ml 1250 ml 885 ml


 


Balance -20 ml 1094.2 ml 428.96 ml











Hemodynamically unstable?:  No


Is patient in severe pain?:  No


Is NPO status required?:  Yes











MARCO ANTONIO PAZ MD           Apr 18, 2020 13:53

## 2020-04-19 VITALS — DIASTOLIC BLOOD PRESSURE: 85 MMHG | SYSTOLIC BLOOD PRESSURE: 152 MMHG

## 2020-04-19 VITALS — SYSTOLIC BLOOD PRESSURE: 154 MMHG | DIASTOLIC BLOOD PRESSURE: 92 MMHG

## 2020-04-19 VITALS — SYSTOLIC BLOOD PRESSURE: 126 MMHG | DIASTOLIC BLOOD PRESSURE: 69 MMHG

## 2020-04-19 VITALS — DIASTOLIC BLOOD PRESSURE: 74 MMHG | SYSTOLIC BLOOD PRESSURE: 122 MMHG

## 2020-04-19 VITALS — DIASTOLIC BLOOD PRESSURE: 78 MMHG | SYSTOLIC BLOOD PRESSURE: 129 MMHG

## 2020-04-19 VITALS — SYSTOLIC BLOOD PRESSURE: 126 MMHG | DIASTOLIC BLOOD PRESSURE: 72 MMHG

## 2020-04-19 VITALS — SYSTOLIC BLOOD PRESSURE: 130 MMHG | DIASTOLIC BLOOD PRESSURE: 73 MMHG

## 2020-04-19 VITALS — SYSTOLIC BLOOD PRESSURE: 121 MMHG | DIASTOLIC BLOOD PRESSURE: 75 MMHG

## 2020-04-19 VITALS — SYSTOLIC BLOOD PRESSURE: 141 MMHG | DIASTOLIC BLOOD PRESSURE: 94 MMHG

## 2020-04-19 VITALS — SYSTOLIC BLOOD PRESSURE: 110 MMHG | DIASTOLIC BLOOD PRESSURE: 61 MMHG

## 2020-04-19 VITALS — SYSTOLIC BLOOD PRESSURE: 119 MMHG | DIASTOLIC BLOOD PRESSURE: 68 MMHG

## 2020-04-19 VITALS — DIASTOLIC BLOOD PRESSURE: 63 MMHG | SYSTOLIC BLOOD PRESSURE: 107 MMHG

## 2020-04-19 VITALS — DIASTOLIC BLOOD PRESSURE: 82 MMHG | SYSTOLIC BLOOD PRESSURE: 135 MMHG

## 2020-04-19 VITALS — SYSTOLIC BLOOD PRESSURE: 116 MMHG | DIASTOLIC BLOOD PRESSURE: 70 MMHG

## 2020-04-19 VITALS — DIASTOLIC BLOOD PRESSURE: 86 MMHG | SYSTOLIC BLOOD PRESSURE: 134 MMHG

## 2020-04-19 VITALS — SYSTOLIC BLOOD PRESSURE: 119 MMHG | DIASTOLIC BLOOD PRESSURE: 79 MMHG

## 2020-04-19 VITALS — SYSTOLIC BLOOD PRESSURE: 139 MMHG | DIASTOLIC BLOOD PRESSURE: 84 MMHG

## 2020-04-19 VITALS — SYSTOLIC BLOOD PRESSURE: 152 MMHG | DIASTOLIC BLOOD PRESSURE: 85 MMHG

## 2020-04-19 VITALS — DIASTOLIC BLOOD PRESSURE: 72 MMHG | SYSTOLIC BLOOD PRESSURE: 152 MMHG

## 2020-04-19 VITALS — DIASTOLIC BLOOD PRESSURE: 78 MMHG | SYSTOLIC BLOOD PRESSURE: 141 MMHG

## 2020-04-19 VITALS — DIASTOLIC BLOOD PRESSURE: 80 MMHG | SYSTOLIC BLOOD PRESSURE: 154 MMHG

## 2020-04-19 VITALS — SYSTOLIC BLOOD PRESSURE: 133 MMHG | DIASTOLIC BLOOD PRESSURE: 74 MMHG

## 2020-04-19 VITALS — SYSTOLIC BLOOD PRESSURE: 153 MMHG | DIASTOLIC BLOOD PRESSURE: 82 MMHG

## 2020-04-19 VITALS — SYSTOLIC BLOOD PRESSURE: 122 MMHG | DIASTOLIC BLOOD PRESSURE: 73 MMHG

## 2020-04-19 LAB
ALBUMIN SERPL-MCNC: 2.8 G/DL (ref 3.4–5)
ALBUMIN/GLOB SERPL: 1.2 {RATIO} (ref 1–1.7)
ALP SERPL-CCNC: 47 U/L (ref 46–116)
ALT SERPL-CCNC: 11 U/L (ref 14–59)
ANION GAP SERPL CALC-SCNC: 10 MMOL/L (ref 6–14)
AST SERPL-CCNC: 21 U/L (ref 15–37)
BASE EXCESS ABG: 1 MMOL/L (ref -3–3)
BASOPHILS # BLD AUTO: 0 X10^3/UL (ref 0–0.2)
BASOPHILS NFR BLD: 0 % (ref 0–3)
BILIRUB SERPL-MCNC: 0.5 MG/DL (ref 0.2–1)
BUN SERPL-MCNC: 65 MG/DL (ref 7–20)
BUN/CREAT SERPL: 46 (ref 6–20)
CALCIUM SERPL-MCNC: 8.4 MG/DL (ref 8.5–10.1)
CHLORIDE SERPL-SCNC: 103 MMOL/L (ref 98–107)
CO2 SERPL-SCNC: 27 MMOL/L (ref 21–32)
CREAT SERPL-MCNC: 1.4 MG/DL (ref 0.6–1)
EOSINOPHIL NFR BLD: 0 % (ref 0–3)
EOSINOPHIL NFR BLD: 0 X10^3/UL (ref 0–0.7)
ERYTHROCYTE [DISTWIDTH] IN BLOOD BY AUTOMATED COUNT: 18.5 % (ref 11.5–14.5)
GFR SERPLBLD BASED ON 1.73 SQ M-ARVRAT: 40 ML/MIN
GLOBULIN SER-MCNC: 2.4 G/DL (ref 2.2–3.8)
GLUCOSE SERPL-MCNC: 136 MG/DL (ref 70–99)
HCO3 BLDA-SCNC: 25 MMOL/L (ref 21–28)
HCT VFR BLD CALC: 21.2 % (ref 36–47)
HGB BLD-MCNC: 7 G/DL (ref 12–15.5)
INSPIRATION SETTING TIME VENT: (no result)
LYMPHOCYTES # BLD: 0.8 X10^3/UL (ref 1–4.8)
LYMPHOCYTES NFR BLD AUTO: 12 % (ref 24–48)
MCH RBC QN AUTO: 31 PG (ref 25–35)
MCHC RBC AUTO-ENTMCNC: 33 G/DL (ref 31–37)
MCV RBC AUTO: 93 FL (ref 79–100)
MONO #: 0.8 X10^3/UL (ref 0–1.1)
MONOCYTES NFR BLD: 11 % (ref 0–9)
NEUT #: 5.3 X10^3/UL (ref 1.8–7.7)
NEUTROPHILS NFR BLD AUTO: 76 % (ref 31–73)
PCO2 BLDA: 36 MMHG (ref 35–46)
PLATELET # BLD AUTO: 497 X10^3/UL (ref 140–400)
PO2 BLDA: 88 MMHG (ref 75–108)
POTASSIUM SERPL-SCNC: 3.8 MMOL/L (ref 3.5–5.1)
PROT SERPL-MCNC: 5.2 G/DL (ref 6.4–8.2)
RBC # BLD AUTO: 2.28 X10^6/UL (ref 3.5–5.4)
SAO2 % BLDA: 96 % (ref 92–99)
SODIUM SERPL-SCNC: 140 MMOL/L (ref 136–145)
WBC # BLD AUTO: 6.9 X10^3/UL (ref 4–11)

## 2020-04-19 RX ADMIN — DEXMEDETOMIDINE HYDROCHLORIDE PRN MLS/HR: 100 INJECTION, SOLUTION, CONCENTRATE INTRAVENOUS at 21:08

## 2020-04-19 RX ADMIN — DEXMEDETOMIDINE HYDROCHLORIDE PRN MLS/HR: 100 INJECTION, SOLUTION, CONCENTRATE INTRAVENOUS at 17:02

## 2020-04-19 RX ADMIN — INSULIN LISPRO SCH UNITS: 100 INJECTION, SOLUTION INTRAVENOUS; SUBCUTANEOUS at 18:00

## 2020-04-19 RX ADMIN — Medication PRN EACH: at 09:17

## 2020-04-19 RX ADMIN — INSULIN LISPRO SCH UNITS: 100 INJECTION, SOLUTION INTRAVENOUS; SUBCUTANEOUS at 06:00

## 2020-04-19 RX ADMIN — DEXMEDETOMIDINE HYDROCHLORIDE PRN MLS/HR: 100 INJECTION, SOLUTION, CONCENTRATE INTRAVENOUS at 01:00

## 2020-04-19 RX ADMIN — MEROPENEM SCH MLS/HR: 500 INJECTION, POWDER, FOR SOLUTION INTRAVENOUS at 22:33

## 2020-04-19 RX ADMIN — DEXMEDETOMIDINE HYDROCHLORIDE PRN MLS/HR: 100 INJECTION, SOLUTION, CONCENTRATE INTRAVENOUS at 04:15

## 2020-04-19 RX ADMIN — DEXMEDETOMIDINE HYDROCHLORIDE PRN MLS/HR: 100 INJECTION, SOLUTION, CONCENTRATE INTRAVENOUS at 22:58

## 2020-04-19 RX ADMIN — DEXMEDETOMIDINE HYDROCHLORIDE PRN MLS/HR: 100 INJECTION, SOLUTION, CONCENTRATE INTRAVENOUS at 12:05

## 2020-04-19 RX ADMIN — HEPARIN SODIUM SCH UNIT: 5000 INJECTION, SOLUTION INTRAVENOUS; SUBCUTANEOUS at 21:09

## 2020-04-19 RX ADMIN — DEXMEDETOMIDINE HYDROCHLORIDE PRN MLS/HR: 100 INJECTION, SOLUTION, CONCENTRATE INTRAVENOUS at 16:04

## 2020-04-19 RX ADMIN — INSULIN LISPRO SCH UNITS: 100 INJECTION, SOLUTION INTRAVENOUS; SUBCUTANEOUS at 00:00

## 2020-04-19 RX ADMIN — DEXMEDETOMIDINE HYDROCHLORIDE PRN MLS/HR: 100 INJECTION, SOLUTION, CONCENTRATE INTRAVENOUS at 10:32

## 2020-04-19 RX ADMIN — PROCHLORPERAZINE EDISYLATE PRN MG: 5 INJECTION INTRAMUSCULAR; INTRAVENOUS at 10:33

## 2020-04-19 RX ADMIN — ACETAMINOPHEN PRN MG: 650 SUPPOSITORY RECTAL at 21:16

## 2020-04-19 RX ADMIN — DEXTROSE SCH MLS/HR: 50 INJECTION, SOLUTION INTRAVENOUS at 08:15

## 2020-04-19 RX ADMIN — DEXMEDETOMIDINE HYDROCHLORIDE PRN MLS/HR: 100 INJECTION, SOLUTION, CONCENTRATE INTRAVENOUS at 07:36

## 2020-04-19 RX ADMIN — INSULIN LISPRO SCH UNITS: 100 INJECTION, SOLUTION INTRAVENOUS; SUBCUTANEOUS at 11:51

## 2020-04-19 RX ADMIN — MEROPENEM SCH MLS/HR: 500 INJECTION, POWDER, FOR SOLUTION INTRAVENOUS at 09:11

## 2020-04-19 RX ADMIN — PANTOPRAZOLE SODIUM SCH MG: 40 INJECTION, POWDER, FOR SOLUTION INTRAVENOUS at 08:15

## 2020-04-19 RX ADMIN — HEPARIN SODIUM SCH UNIT: 5000 INJECTION, SOLUTION INTRAVENOUS; SUBCUTANEOUS at 08:23

## 2020-04-19 NOTE — RAD
EXAM: PORTABLE CHEST 1V

 

INDICATION: Tracheostomy patient on the ventilator..

 

TECHNIQUE: Single view 

 

COMPARISON: 4/14/2020

 

FINDINGS:

 

Tracheostomy tube remains present with the tip 4.5 cm above the nati.

Right jugular approach central venous catheter and left jugular approach 

central venous catheter remain present, essentially unchanged.

Tubing overlying the right upper extremity resembling a PICC line is no 

longer seen.

Enteric tube remains present passing below the diaphragms.

 

The heart size is normal.

 

The great vessels appear unremarkable.

 

Both drew are obscured by perihilar airspace opacities.

 

Lungs remain low in volume but also show interval marked atelectasis of 

the right middle lobe and ill-defined opacity developing in the left upper

lobe.

 

No pneumothorax is seen but moderate bilateral pleural effusions are 

present.

 

There are no significant osseous abnormalities.

 

IMPRESSION:

 

Tracheostomy tube with no pneumothorax but bilateral pleural effusions and

worsening bilateral atelectasis/airspace opacities.

 

 

Electronically signed by: Adriana Forrest MD (4/19/2020 8:04 AM) 

[id number]

## 2020-04-19 NOTE — NUR
CPAP trial at 0800. Pressure support of 20/5, RR in 40's, Vt 250. Patient anxious, unable to 
calm down. Switched back to AC mode 0805. Will try again a little later. Will monitor.

## 2020-04-19 NOTE — PDOC
Infectious Disease Note


Subjective


Subjective


Not feeling well


Remains on vent via trach, FiO2 35 % PEEP 5 SpO2 100%


TPN 


Fever 100.5 last evening





ROS


ROS


per HPI





Vital Sign


Vital Signs





Vital Signs








  Date Time  Temp Pulse Resp B/P (MAP) Pulse Ox O2 Delivery O2 Flow Rate FiO2


 


4/19/20 08:00 99.0 85 24 110/61 (77) 100 Ventilator  





 99.0       











Physical Exam


PHYSICAL EXAM


GENERAL: Propped up in bed, resting quietly, arouses to voice


HEENT: Pupils equal, + NGT, oral cavity dry 


NECK:  Trach/vent 


LUNGS: rhonchi 


HEART:  S1, S2, regular 


ABDOMEN: Distended, tender, hypoactive BS, 


: [place] (4/14)


EXTREMITIES: Generalized edema, no cyanosis, SCDs bilaterally 


DERMATOLOGIC:  Warm and dry.  No generalized rash.  


CENTRAL NERVOUS SYSTEM: Nods appropriately to few questions, 


HDC & LIJ (4/14) clean





Labs


Lab





Laboratory Tests








Test


 4/18/20


14:41 4/18/20


18:13 4/19/20


00:57 4/19/20


06:50


 


Glucose (Fingerstick)


 134 mg/dL


(70-99) 135 mg/dL


(70-99) 147 mg/dL


(70-99) 





 


White Blood Count


 


 


 


 6.9 x10^3/uL


(4.0-11.0)


 


Red Blood Count


 


 


 


 2.28 x10^6/uL


(3.50-5.40)


 


Hemoglobin


 


 


 


 7.0 g/dL


(12.0-15.5)


 


Hematocrit


 


 


 


 21.2 %


(36.0-47.0)


 


Mean Corpuscular Volume    93 fL () 


 


Mean Corpuscular Hemoglobin    31 pg (25-35) 


 


Mean Corpuscular Hemoglobin


Concent 


 


 


 33 g/dL


(31-37)


 


Red Cell Distribution Width


 


 


 


 18.5 %


(11.5-14.5)


 


Platelet Count


 


 


 


 497 x10^3/uL


(140-400)


 


Neutrophils (%) (Auto)    76 % (31-73) 


 


Lymphocytes (%) (Auto)    12 % (24-48) 


 


Monocytes (%) (Auto)    11 % (0-9) 


 


Eosinophils (%) (Auto)    0 % (0-3) 


 


Basophils (%) (Auto)    0 % (0-3) 


 


Neutrophils # (Auto)


 


 


 


 5.3 x10^3/uL


(1.8-7.7)


 


Lymphocytes # (Auto)


 


 


 


 0.8 x10^3/uL


(1.0-4.8)


 


Monocytes # (Auto)


 


 


 


 0.8 x10^3/uL


(0.0-1.1)


 


Eosinophils # (Auto)


 


 


 


 0.0 x10^3/uL


(0.0-0.7)


 


Basophils # (Auto)


 


 


 


 0.0 x10^3/uL


(0.0-0.2)


 


Sodium Level


 


 


 


 140 mmol/L


(136-145)


 


Potassium Level


 


 


 


 3.8 mmol/L


(3.5-5.1)


 


Chloride Level


 


 


 


 103 mmol/L


()


 


Carbon Dioxide Level


 


 


 


 27 mmol/L


(21-32)


 


Anion Gap    10 (6-14) 


 


Blood Urea Nitrogen


 


 


 


 65 mg/dL


(7-20)


 


Creatinine


 


 


 


 1.4 mg/dL


(0.6-1.0)


 


Estimated GFR


(Cockcroft-Gault) 


 


 


 40.0 





 


BUN/Creatinine Ratio    46 (6-20) 


 


Glucose Level


 


 


 


 136 mg/dL


(70-99)


 


Calcium Level


 


 


 


 8.4 mg/dL


(8.5-10.1)


 


Magnesium Level


 


 


 


 1.8 mg/dL


(1.8-2.4)


 


Total Bilirubin


 


 


 


 0.5 mg/dL


(0.2-1.0)


 


Aspartate Amino Transf


(AST/SGOT) 


 


 


 21 U/L (15-37) 





 


Alanine Aminotransferase


(ALT/SGPT) 


 


 


 11 U/L (14-59) 





 


Alkaline Phosphatase


 


 


 


 47 U/L


()


 


Total Protein


 


 


 


 5.2 g/dL


(6.4-8.2)


 


Albumin


 


 


 


 2.8 g/dL


(3.4-5.0)


 


Albumin/Globulin Ratio    1.2 (1.0-1.7) 


 


Test


 4/19/20


06:58 


 


 





 


Glucose (Fingerstick)


 126 mg/dL


(70-99) 


 


 








CXR, 4/19


Tracheostomy tube with no pneumothorax but bilateral pleural effusions and


worsening bilateral atelectasis/airspace opacities.


Micro


Abdominal fluid, 4/15


  ANAEROBIC RES 1  


                                PENDING





  AEROBIC CULT  Preliminary  


        Preliminary report





  AEROBIC RES 1  Preliminary  


        Comment





        No growth in 36 - 48 hours.





Objective


Assessment


Leukocytosis 4/10 -improved 


Fever


   - New left IJ/[place] 4/14. PICC removed 4/14


   -? pancreatitis. blood cults 4/10 neg. Urine - neg, 


Ascites s/p paracentesis 4/15 - 4300 ml. cultures neg to date 


Severe acute gallstone pancreatitis with necrosis


   -CT 4/14 necrotizing pancreatitis with fluid and phlegmon at the pancreas 


Hypotension off levaphed 


Julian Pleural effusions


JED requiring HD 


   - s/p RIJ temporary dialysis catheter replacement, 4/2


Vent dependent respiratory failure


   -s/p Trach placement 


   -lung opacities, COVID-19 neg 


Anasarca - worse


Anemia - S/p PRBC 3/26


Hypocalcemia 


Prediabetes


HTN


Diarrhea, C. diff neg 3/23


Anemia - S/p PRBCs





Plan


Plan of Care


cont merrem (4/8) and Zyvox (4/10),micafungin, Flagyl 4/14


-previously on cefepime, flagyl & dapto 


Gen surgery following


Maintain aspiration precautions 


Anemia deferred to primary


CBC in am





Critically ill











D/w mother - explained Fever likely sec to pancreatitis and or /Ileus - D/w Dr. Flores earlier in week.  abd again a little more distended today.  May need 

thoracentesis vs sec paracentesis.  Await pulm and GI f/u


labs in am





D/w nursing





Attending Co-Sign


Attending Co-Sign


The patient was seen and interviewed as well as examined at the bedside. The 

chart was reviewed. The case was discussed. Agree with the plan of care.











HAVEN WINTERS        Apr 19, 2020 10:05


RASHAWN ROSEN MD              Apr 19, 2020 14:54

## 2020-04-19 NOTE — PDOC
PULMONARY PROGRESS NOTES


Subjective


Patient intubated on 3/23 , s/p trach 4/6, on  AC mode, follows some  commands, 

on precedex. small trach secretion, not much weaning yesterday, had HD and was 

febrile


S/P paracentisis with 4 liters removed on 4/15/20


Vitals





Vital Signs








  Date Time  Temp Pulse Resp B/P (MAP) Pulse Ox O2 Delivery O2 Flow Rate FiO2


 


4/19/20 05:27     100 Ventilator  


 


4/18/20 23:00  96 33 139/74 (95)    


 


4/18/20 20:00 100.5       





 100.5       








Comments


ros unable to obtain  on vent


Lungs:  Other (dimished in BLL  nc at perrl   nose clear   neck trach site ok   

no lad  no thyromegaly)


Cardiovascular:  S1, S2


Abdomen:  Soft, Non-tender, Other (distended)


Extremities:  Other (+3 generalized edema )


Skin:  Warm, Dry


Labs





Laboratory Tests








Test


 4/17/20


06:00 4/17/20


06:06 4/17/20


07:00 4/17/20


08:00


 


Sodium Level


 140 mmol/L


(136-145) 


 


 





 


Potassium Level


 3.7 mmol/L


(3.5-5.1) 


 


 





 


Chloride Level


 103 mmol/L


() 


 


 





 


Carbon Dioxide Level


 26 mmol/L


(21-32) 


 


 





 


Anion Gap 11 (6-14)    


 


Blood Urea Nitrogen


 80 mg/dL


(7-20) 


 


 





 


Creatinine


 1.6 mg/dL


(0.6-1.0) 


 


 





 


Estimated GFR


(Cockcroft-Gault) 34.3 


 


 


 





 


BUN/Creatinine Ratio 50 (6-20)    


 


Glucose Level


 130 mg/dL


(70-99) 


 


 





 


Calcium Level


 8.4 mg/dL


(8.5-10.1) 


 


 





 


Phosphorus Level


 5.2 mg/dL


(2.6-4.7) 


 


 





 


Total Bilirubin


 0.4 mg/dL


(0.2-1.0) 


 


 





 


Aspartate Amino Transf


(AST/SGOT) 23 U/L (15-37) 


 


 


 





 


Alanine Aminotransferase


(ALT/SGPT) 12 U/L (14-59) 


 


 


 





 


Alkaline Phosphatase


 52 U/L


() 


 


 





 


Total Protein


 4.9 g/dL


(6.4-8.2) 


 


 





 


Albumin


 2.6 g/dL


(3.4-5.0) 


 


 





 


Albumin/Globulin Ratio 1.1 (1.0-1.7)    


 


Glucose (Fingerstick)


 


 129 mg/dL


(70-99) 


 





 


White Blood Count


 


 


 5.8 x10^3/uL


(4.0-11.0) 





 


Red Blood Count


 


 


 2.40 x10^6/uL


(3.50-5.40) 





 


Hemoglobin


 


 


 7.6 g/dL


(12.0-15.5) 





 


Hematocrit


 


 


 22.8 %


(36.0-47.0) 





 


Mean Corpuscular Volume   95 fL ()  


 


Mean Corpuscular Hemoglobin   32 pg (25-35)  


 


Mean Corpuscular Hemoglobin


Concent 


 


 33 g/dL


(31-37) 





 


Red Cell Distribution Width


 


 


 18.4 %


(11.5-14.5) 





 


Platelet Count


 


 


 498 x10^3/uL


(140-400) 





 


Neutrophils (%) (Auto)   67 % (31-73)  


 


Lymphocytes (%) (Auto)   17 % (24-48)  


 


Monocytes (%) (Auto)   14 % (0-9)  


 


Eosinophils (%) (Auto)   1 % (0-3)  


 


Basophils (%) (Auto)   1 % (0-3)  


 


Neutrophils # (Auto)


 


 


 3.9 x10^3/uL


(1.8-7.7) 





 


Lymphocytes # (Auto)


 


 


 1.0 x10^3/uL


(1.0-4.8) 





 


Monocytes # (Auto)


 


 


 0.8 x10^3/uL


(0.0-1.1) 





 


Eosinophils # (Auto)


 


 


 0.0 x10^3/uL


(0.0-0.7) 





 


Basophils # (Auto)


 


 


 0.0 x10^3/uL


(0.0-0.2) 





 


O2 Saturation    98 % (92-99) 


 


Arterial Blood pH


 


 


 


 7.47


(7.35-7.45)


 


Arterial Blood pCO2 at


Patient Temp 


 


 


 33 mmHg


(35-46)


 


Arterial Blood pO2 at Patient


Temp 


 


 


 136 mmHg


()


 


Arterial Blood HCO3


 


 


 


 24 mmol/L


(21-28)


 


Arterial Blood Base Excess


 


 


 


 0 mmol/L


(-3-3)


 


FiO2    35 


 


Test


 4/17/20


13:08 4/17/20


17:06 4/18/20


06:10 4/18/20


07:17


 


Glucose (Fingerstick)


 181 mg/dL


(70-99) 146 mg/dL


(70-99) 


 137 mg/dL


(70-99)


 


White Blood Count


 


 


 6.3 x10^3/uL


(4.0-11.0) 





 


Red Blood Count


 


 


 2.23 x10^6/uL


(3.50-5.40) 





 


Hemoglobin


 


 


 7.1 g/dL


(12.0-15.5) 





 


Hematocrit


 


 


 21.1 %


(36.0-47.0) 





 


Mean Corpuscular Volume   95 fL ()  


 


Mean Corpuscular Hemoglobin   32 pg (25-35)  


 


Mean Corpuscular Hemoglobin


Concent 


 


 33 g/dL


(31-37) 





 


Red Cell Distribution Width


 


 


 18.1 %


(11.5-14.5) 





 


Platelet Count


 


 


 539 x10^3/uL


(140-400) 





 


Neutrophils (%) (Auto)   74 % (31-73)  


 


Lymphocytes (%) (Auto)   12 % (24-48)  


 


Monocytes (%) (Auto)   14 % (0-9)  


 


Eosinophils (%) (Auto)   0 % (0-3)  


 


Basophils (%) (Auto)   0 % (0-3)  


 


Neutrophils # (Auto)


 


 


 4.7 x10^3/uL


(1.8-7.7) 





 


Lymphocytes # (Auto)


 


 


 0.7 x10^3/uL


(1.0-4.8) 





 


Monocytes # (Auto)


 


 


 0.9 x10^3/uL


(0.0-1.1) 





 


Eosinophils # (Auto)


 


 


 0.0 x10^3/uL


(0.0-0.7) 





 


Basophils # (Auto)


 


 


 0.0 x10^3/uL


(0.0-0.2) 





 


Sodium Level


 


 


 141 mmol/L


(136-145) 





 


Potassium Level


 


 


 3.3 mmol/L


(3.5-5.1) 





 


Chloride Level


 


 


 103 mmol/L


() 





 


Carbon Dioxide Level


 


 


 25 mmol/L


(21-32) 





 


Anion Gap   13 (6-14)  


 


Blood Urea Nitrogen


 


 


 92 mg/dL


(7-20) 





 


Creatinine


 


 


 1.9 mg/dL


(0.6-1.0) 





 


Estimated GFR


(Cockcroft-Gault) 


 


 28.1 


 





 


BUN/Creatinine Ratio   48 (6-20)  


 


Glucose Level


 


 


 152 mg/dL


(70-99) 





 


Calcium Level


 


 


 8.6 mg/dL


(8.5-10.1) 





 


Phosphorus Level


 


 


 5.2 mg/dL


(2.6-4.7) 





 


Magnesium Level


 


 


 1.7 mg/dL


(1.8-2.4) 





 


Total Bilirubin


 


 


 0.4 mg/dL


(0.2-1.0) 





 


Aspartate Amino Transf


(AST/SGOT) 


 


 23 U/L (15-37) 


 





 


Alanine Aminotransferase


(ALT/SGPT) 


 


 11 U/L (14-59) 


 





 


Alkaline Phosphatase


 


 


 49 U/L


() 





 


Total Protein


 


 


 5.1 g/dL


(6.4-8.2) 





 


Albumin


 


 


 2.6 g/dL


(3.4-5.0) 





 


Albumin/Globulin Ratio   1.0 (1.0-1.7)  


 


Test


 4/18/20


14:41 4/18/20


18:13 4/19/20


00:57 





 


Glucose (Fingerstick)


 134 mg/dL


(70-99) 135 mg/dL


(70-99) 147 mg/dL


(70-99) 











Laboratory Tests








Test


 4/18/20


06:10 4/18/20


07:17 4/18/20


14:41 4/18/20


18:13


 


White Blood Count


 6.3 x10^3/uL


(4.0-11.0) 


 


 





 


Red Blood Count


 2.23 x10^6/uL


(3.50-5.40) 


 


 





 


Hemoglobin


 7.1 g/dL


(12.0-15.5) 


 


 





 


Hematocrit


 21.1 %


(36.0-47.0) 


 


 





 


Mean Corpuscular Volume 95 fL ()    


 


Mean Corpuscular Hemoglobin 32 pg (25-35)    


 


Mean Corpuscular Hemoglobin


Concent 33 g/dL


(31-37) 


 


 





 


Red Cell Distribution Width


 18.1 %


(11.5-14.5) 


 


 





 


Platelet Count


 539 x10^3/uL


(140-400) 


 


 





 


Neutrophils (%) (Auto) 74 % (31-73)    


 


Lymphocytes (%) (Auto) 12 % (24-48)    


 


Monocytes (%) (Auto) 14 % (0-9)    


 


Eosinophils (%) (Auto) 0 % (0-3)    


 


Basophils (%) (Auto) 0 % (0-3)    


 


Neutrophils # (Auto)


 4.7 x10^3/uL


(1.8-7.7) 


 


 





 


Lymphocytes # (Auto)


 0.7 x10^3/uL


(1.0-4.8) 


 


 





 


Monocytes # (Auto)


 0.9 x10^3/uL


(0.0-1.1) 


 


 





 


Eosinophils # (Auto)


 0.0 x10^3/uL


(0.0-0.7) 


 


 





 


Basophils # (Auto)


 0.0 x10^3/uL


(0.0-0.2) 


 


 





 


Sodium Level


 141 mmol/L


(136-145) 


 


 





 


Potassium Level


 3.3 mmol/L


(3.5-5.1) 


 


 





 


Chloride Level


 103 mmol/L


() 


 


 





 


Carbon Dioxide Level


 25 mmol/L


(21-32) 


 


 





 


Anion Gap 13 (6-14)    


 


Blood Urea Nitrogen


 92 mg/dL


(7-20) 


 


 





 


Creatinine


 1.9 mg/dL


(0.6-1.0) 


 


 





 


Estimated GFR


(Cockcroft-Gault) 28.1 


 


 


 





 


BUN/Creatinine Ratio 48 (6-20)    


 


Glucose Level


 152 mg/dL


(70-99) 


 


 





 


Calcium Level


 8.6 mg/dL


(8.5-10.1) 


 


 





 


Phosphorus Level


 5.2 mg/dL


(2.6-4.7) 


 


 





 


Magnesium Level


 1.7 mg/dL


(1.8-2.4) 


 


 





 


Total Bilirubin


 0.4 mg/dL


(0.2-1.0) 


 


 





 


Aspartate Amino Transf


(AST/SGOT) 23 U/L (15-37) 


 


 


 





 


Alanine Aminotransferase


(ALT/SGPT) 11 U/L (14-59) 


 


 


 





 


Alkaline Phosphatase


 49 U/L


() 


 


 





 


Total Protein


 5.1 g/dL


(6.4-8.2) 


 


 





 


Albumin


 2.6 g/dL


(3.4-5.0) 


 


 





 


Albumin/Globulin Ratio 1.0 (1.0-1.7)    


 


Glucose (Fingerstick)


 


 137 mg/dL


(70-99) 134 mg/dL


(70-99) 135 mg/dL


(70-99)


 


Test


 4/19/20


00:57 


 


 





 


Glucose (Fingerstick)


 147 mg/dL


(70-99) 


 


 











Medications





Active Scripts








 Medications  Dose


 Route/Sig


 Max Daily Dose Days Date Category


 


 Bisoprolol


 Fumarate 5 Mg


 Tablet  10 Mg


 PO DAILY


   3/16/20 Reported











Impression


.


IMPRESSION:


1.  Acute hypoxemic respiratory failure secondary to ARDS status post trach,


2.  Gallstone pancreatitis.with NECROSIS, NOT A SURGICAL CANDIDATE


3.  Severe metabolic acidosis.stable


4.  Acute kidney injury-stable, ON HD-- continue to improve 


5.  Acute gallstone pancreatitis.


6.  Hypoalbuminemia.


7.  Moderate persistent effusions


8.  Fever-resolved 


9.  Chronic anemia


10. Covid 19 testing negative


11. Moderate to large ascites-S/P paracentisis


1





Plan


.


Cont. ventilatory support, ABG reviewed 35%/PEEP 5, weaning as tolerated, 

monitor resp status closely, 


Place back on A/C mod at HS


hep sq and protonix for prophylaxis


Follow surgery recs


Follow ID rec, abx per id


Follow nephrology recs 


Tolerated paracentesis well removed 4 liters on 4./15/20


continue TPN for nutrition 





DVT/GI PPX 


d/w RN/RT











MAN BLAKE MD               Apr 19, 2020 06:06

## 2020-04-19 NOTE — PDOC
PROGRESS NOTES


Chief Complaint


Chief Complaint


A/P:


Respiratory failure requiring mechanical ventilation (on vent since 3/23)


bilateral pleural effusions/pulm edema 


Sepsis


Severe Acute gallstone pancreatitis (not a surgical candidate at this time) with

necrosis


Acute kidney failure now requiring dialysis


Salpingitis


Gallstones (Calculus of gallbladder with acute cholecystitis without 

obstruction)


HTN 


Leukocytosis 


Hypoxia


Uterine fibroid


Intractable pain


Intractable nausea


Covid 19 negative. 


Acute on chronic anemia 


EEG: No seizure activity.


ESRD on HD


Hyperglycemia, persistently in 200s





History of Present Illness


History of Present Illness


Ms Tadeo is a 48yo F w/ PMHx HTN, prediabetes who presented to the emergency 

room with complaints of abdominal pain on 3/16/2020. Found with Lipase 51492, 

, , Bilirubin 1.4.


CT abdomen confirms pancreatic inflammation, peripancreatic fluid and 

inflammatory changes around the pancreas consistent with pancreatitis. 

Cholelithiasis and 1.4cm uterine fibroid as well as possible left salpingitis. 

Admitted for further care


GI, General surgery, ID, Pulm consulted.





3/17: No urine output. Added dilaudid for pain, PICC placed per IR. Renal US 

negative.Seen bedside in ICU, given 2L additional NSS and albumin infusion. 

Still hypotensive, started on levophed. Repeat CT abdomen w/ necrosis. 


3/18: O2 saturation 87% on nasal cannula oxygen. Dialysis catheter per 

nephrology


3/19: She is now on BiPAP appears more ill, now on dialysis


3/20: Seen on BiPAP. Her mother and another family member are present and seemed

to be good support for her. Currently on dialysis. Appears critically ill


3/21: Overnight Tmax 101.7 , still on BiPAP FiO2 40%, still on low dose Levophed

gtt, TPN initiated. On dialysis


4/6: Tracheostomy


4/12: S/p tracheostomy on vent spontaneous respirations with 5 of pressure 

support 35% FiO2, rectal tube and a [place], off pressors


4/13: Off pressors. Seen on dialysis this morning. BUN 80. Tracking with her 

eyes. Still on vent via trach.


4/14: BUN 68, Cr 1.7. Temp 100.2F axillary. WBC 9.8. Still on vent via trach. 

Tracking reasonably well. In obvious discomfort. Removed PICC and CVC LIJ and 

replaced. Tips sent for culture. CT chest/abd/pelvis with bilateral pleural 

effusion and ascites.


4/15: Renal function stable. Still on vent. More interactive today. Miming wish 

for food. Plan discussed for thoracentesis/paracentesis with daughters today. 

They were under impression patient was doing worse due to a miscommunication 

which has been clarified over the phone. 4.3L removed.


4/16: BUN 88, Cr 1.8. Much more interactive today. Appears more comfortable 

today.


4/17: Febrile overnight 101.8F. More interactive, still on vent. Asking for ice 

by miming.


4/18: Afebrile overnight. TMax last 24 hours 100.6F. Hb 7.1. Interactive when 

awake.





Afebrile. Hb 7. Not tolerating vent wean. Very interactive, on low dose 

precedex. Excellent UOP over the past 72 hours





Plan:


Cont vent weaning, dialysis


Trach shield during day if ok with pulm. Would recommend ABG after 4 hours to 

r/o CO2 retention, however and still vent overnight





Vitals


Vitals





Vital Signs








  Date Time  Temp Pulse Resp B/P (MAP) Pulse Ox O2 Delivery O2 Flow Rate FiO2


 


4/19/20 08:00      Mechanical Ventilator  


 


4/19/20 07:30     100   


 


4/19/20 07:00  95 18 126/69 (88)    


 


4/19/20 04:00 98.9       





 98.9       











Physical Exam


Physical Exam


GENERAL: Propped up in bed, resting quietly, arouses to voice


HEENT: Pupils equal, + NGT, oral cavity dry 


NECK:  Trach/vent 


LUNGS: Diminished aeration 


HEART:  S1, S2, regular 


ABDOMEN:  Less distended, tender, hypoactive BS, 


: [place]  changed 4/14


EXTREMITIES: Generalized edema, no cyanosis, SCDs bilaterally 


DERMATOLOGIC:  Warm and dry.  No generalized rash.  


CENTRAL NERVOUS SYSTEM: Nods appropriately to few questions, 


HDC & LIJ (4/14) clean


General:  Other (SOMNULENT)


Heart:  Other (increased rate)


Lungs:  Other (dimished in BLL)


Abdomen:  Other (DISTENDED)


Extremities:  Other (ANASARCA)


Skin:  Other (mottling noted to extremities )





Labs


LABS





Laboratory Tests








Test


 4/18/20


14:41 4/18/20


18:13 4/19/20


00:57 4/19/20


06:50


 


Glucose (Fingerstick)


 134 mg/dL


(70-99) 135 mg/dL


(70-99) 147 mg/dL


(70-99) 





 


White Blood Count


 


 


 


 6.9 x10^3/uL


(4.0-11.0)


 


Red Blood Count


 


 


 


 2.28 x10^6/uL


(3.50-5.40)


 


Hemoglobin


 


 


 


 7.0 g/dL


(12.0-15.5)


 


Hematocrit


 


 


 


 21.2 %


(36.0-47.0)


 


Mean Corpuscular Volume    93 fL () 


 


Mean Corpuscular Hemoglobin    31 pg (25-35) 


 


Mean Corpuscular Hemoglobin


Concent 


 


 


 33 g/dL


(31-37)


 


Red Cell Distribution Width


 


 


 


 18.5 %


(11.5-14.5)


 


Platelet Count


 


 


 


 497 x10^3/uL


(140-400)


 


Neutrophils (%) (Auto)    76 % (31-73) 


 


Lymphocytes (%) (Auto)    12 % (24-48) 


 


Monocytes (%) (Auto)    11 % (0-9) 


 


Eosinophils (%) (Auto)    0 % (0-3) 


 


Basophils (%) (Auto)    0 % (0-3) 


 


Neutrophils # (Auto)


 


 


 


 5.3 x10^3/uL


(1.8-7.7)


 


Lymphocytes # (Auto)


 


 


 


 0.8 x10^3/uL


(1.0-4.8)


 


Monocytes # (Auto)


 


 


 


 0.8 x10^3/uL


(0.0-1.1)


 


Eosinophils # (Auto)


 


 


 


 0.0 x10^3/uL


(0.0-0.7)


 


Basophils # (Auto)


 


 


 


 0.0 x10^3/uL


(0.0-0.2)


 


Sodium Level


 


 


 


 140 mmol/L


(136-145)


 


Potassium Level


 


 


 


 3.8 mmol/L


(3.5-5.1)


 


Chloride Level


 


 


 


 103 mmol/L


()


 


Carbon Dioxide Level


 


 


 


 27 mmol/L


(21-32)


 


Anion Gap    10 (6-14) 


 


Blood Urea Nitrogen


 


 


 


 65 mg/dL


(7-20)


 


Creatinine


 


 


 


 1.4 mg/dL


(0.6-1.0)


 


Estimated GFR


(Cockcroft-Gault) 


 


 


 40.0 





 


BUN/Creatinine Ratio    46 (6-20) 


 


Glucose Level


 


 


 


 136 mg/dL


(70-99)


 


Calcium Level


 


 


 


 8.4 mg/dL


(8.5-10.1)


 


Magnesium Level


 


 


 


 1.8 mg/dL


(1.8-2.4)


 


Total Bilirubin


 


 


 


 0.5 mg/dL


(0.2-1.0)


 


Aspartate Amino Transf


(AST/SGOT) 


 


 


 21 U/L (15-37) 





 


Alanine Aminotransferase


(ALT/SGPT) 


 


 


 11 U/L (14-59) 





 


Alkaline Phosphatase


 


 


 


 47 U/L


()


 


Total Protein


 


 


 


 5.2 g/dL


(6.4-8.2)


 


Albumin


 


 


 


 2.8 g/dL


(3.4-5.0)


 


Albumin/Globulin Ratio    1.2 (1.0-1.7) 


 


Test


 4/19/20


06:58 


 


 





 


Glucose (Fingerstick)


 126 mg/dL


(70-99) 


 


 














Assessment and Plan


Assessmemt and Plan


Problems


Medical Problems:


(1) Acute pancreatitis


Status: Acute  





(2) Cholelithiasis


Status: Acute  











Comment


Review of Relevant


I have reviewed the following items josy (where applicable) has been applied.


Labs





Laboratory Tests








Test


 4/17/20


13:08 4/17/20


17:06 4/18/20


06:10 4/18/20


07:17


 


Glucose (Fingerstick)


 181 mg/dL


(70-99) 146 mg/dL


(70-99) 


 137 mg/dL


(70-99)


 


White Blood Count


 


 


 6.3 x10^3/uL


(4.0-11.0) 





 


Red Blood Count


 


 


 2.23 x10^6/uL


(3.50-5.40) 





 


Hemoglobin


 


 


 7.1 g/dL


(12.0-15.5) 





 


Hematocrit


 


 


 21.1 %


(36.0-47.0) 





 


Mean Corpuscular Volume   95 fL ()  


 


Mean Corpuscular Hemoglobin   32 pg (25-35)  


 


Mean Corpuscular Hemoglobin


Concent 


 


 33 g/dL


(31-37) 





 


Red Cell Distribution Width


 


 


 18.1 %


(11.5-14.5) 





 


Platelet Count


 


 


 539 x10^3/uL


(140-400) 





 


Neutrophils (%) (Auto)   74 % (31-73)  


 


Lymphocytes (%) (Auto)   12 % (24-48)  


 


Monocytes (%) (Auto)   14 % (0-9)  


 


Eosinophils (%) (Auto)   0 % (0-3)  


 


Basophils (%) (Auto)   0 % (0-3)  


 


Neutrophils # (Auto)


 


 


 4.7 x10^3/uL


(1.8-7.7) 





 


Lymphocytes # (Auto)


 


 


 0.7 x10^3/uL


(1.0-4.8) 





 


Monocytes # (Auto)


 


 


 0.9 x10^3/uL


(0.0-1.1) 





 


Eosinophils # (Auto)


 


 


 0.0 x10^3/uL


(0.0-0.7) 





 


Basophils # (Auto)


 


 


 0.0 x10^3/uL


(0.0-0.2) 





 


Sodium Level


 


 


 141 mmol/L


(136-145) 





 


Potassium Level


 


 


 3.3 mmol/L


(3.5-5.1) 





 


Chloride Level


 


 


 103 mmol/L


() 





 


Carbon Dioxide Level


 


 


 25 mmol/L


(21-32) 





 


Anion Gap   13 (6-14)  


 


Blood Urea Nitrogen


 


 


 92 mg/dL


(7-20) 





 


Creatinine


 


 


 1.9 mg/dL


(0.6-1.0) 





 


Estimated GFR


(Cockcroft-Gault) 


 


 28.1 


 





 


BUN/Creatinine Ratio   48 (6-20)  


 


Glucose Level


 


 


 152 mg/dL


(70-99) 





 


Calcium Level


 


 


 8.6 mg/dL


(8.5-10.1) 





 


Phosphorus Level


 


 


 5.2 mg/dL


(2.6-4.7) 





 


Magnesium Level


 


 


 1.7 mg/dL


(1.8-2.4) 





 


Total Bilirubin


 


 


 0.4 mg/dL


(0.2-1.0) 





 


Aspartate Amino Transf


(AST/SGOT) 


 


 23 U/L (15-37) 


 





 


Alanine Aminotransferase


(ALT/SGPT) 


 


 11 U/L (14-59) 


 





 


Alkaline Phosphatase


 


 


 49 U/L


() 





 


Total Protein


 


 


 5.1 g/dL


(6.4-8.2) 





 


Albumin


 


 


 2.6 g/dL


(3.4-5.0) 





 


Albumin/Globulin Ratio   1.0 (1.0-1.7)  


 


Test


 4/18/20


14:41 4/18/20


18:13 4/19/20


00:57 4/19/20


06:50


 


Glucose (Fingerstick)


 134 mg/dL


(70-99) 135 mg/dL


(70-99) 147 mg/dL


(70-99) 





 


White Blood Count


 


 


 


 6.9 x10^3/uL


(4.0-11.0)


 


Red Blood Count


 


 


 


 2.28 x10^6/uL


(3.50-5.40)


 


Hemoglobin


 


 


 


 7.0 g/dL


(12.0-15.5)


 


Hematocrit


 


 


 


 21.2 %


(36.0-47.0)


 


Mean Corpuscular Volume    93 fL () 


 


Mean Corpuscular Hemoglobin    31 pg (25-35) 


 


Mean Corpuscular Hemoglobin


Concent 


 


 


 33 g/dL


(31-37)


 


Red Cell Distribution Width


 


 


 


 18.5 %


(11.5-14.5)


 


Platelet Count


 


 


 


 497 x10^3/uL


(140-400)


 


Neutrophils (%) (Auto)    76 % (31-73) 


 


Lymphocytes (%) (Auto)    12 % (24-48) 


 


Monocytes (%) (Auto)    11 % (0-9) 


 


Eosinophils (%) (Auto)    0 % (0-3) 


 


Basophils (%) (Auto)    0 % (0-3) 


 


Neutrophils # (Auto)


 


 


 


 5.3 x10^3/uL


(1.8-7.7)


 


Lymphocytes # (Auto)


 


 


 


 0.8 x10^3/uL


(1.0-4.8)


 


Monocytes # (Auto)


 


 


 


 0.8 x10^3/uL


(0.0-1.1)


 


Eosinophils # (Auto)


 


 


 


 0.0 x10^3/uL


(0.0-0.7)


 


Basophils # (Auto)


 


 


 


 0.0 x10^3/uL


(0.0-0.2)


 


Sodium Level


 


 


 


 140 mmol/L


(136-145)


 


Potassium Level


 


 


 


 3.8 mmol/L


(3.5-5.1)


 


Chloride Level


 


 


 


 103 mmol/L


()


 


Carbon Dioxide Level


 


 


 


 27 mmol/L


(21-32)


 


Anion Gap    10 (6-14) 


 


Blood Urea Nitrogen


 


 


 


 65 mg/dL


(7-20)


 


Creatinine


 


 


 


 1.4 mg/dL


(0.6-1.0)


 


Estimated GFR


(Cockcroft-Gault) 


 


 


 40.0 





 


BUN/Creatinine Ratio    46 (6-20) 


 


Glucose Level


 


 


 


 136 mg/dL


(70-99)


 


Calcium Level


 


 


 


 8.4 mg/dL


(8.5-10.1)


 


Magnesium Level


 


 


 


 1.8 mg/dL


(1.8-2.4)


 


Total Bilirubin


 


 


 


 0.5 mg/dL


(0.2-1.0)


 


Aspartate Amino Transf


(AST/SGOT) 


 


 


 21 U/L (15-37) 





 


Alanine Aminotransferase


(ALT/SGPT) 


 


 


 11 U/L (14-59) 





 


Alkaline Phosphatase


 


 


 


 47 U/L


()


 


Total Protein


 


 


 


 5.2 g/dL


(6.4-8.2)


 


Albumin


 


 


 


 2.8 g/dL


(3.4-5.0)


 


Albumin/Globulin Ratio    1.2 (1.0-1.7) 


 


Test


 4/19/20


06:58 


 


 





 


Glucose (Fingerstick)


 126 mg/dL


(70-99) 


 


 











Laboratory Tests








Test


 4/18/20


14:41 4/18/20


18:13 4/19/20


00:57 4/19/20


06:50


 


Glucose (Fingerstick)


 134 mg/dL


(70-99) 135 mg/dL


(70-99) 147 mg/dL


(70-99) 





 


White Blood Count


 


 


 


 6.9 x10^3/uL


(4.0-11.0)


 


Red Blood Count


 


 


 


 2.28 x10^6/uL


(3.50-5.40)


 


Hemoglobin


 


 


 


 7.0 g/dL


(12.0-15.5)


 


Hematocrit


 


 


 


 21.2 %


(36.0-47.0)


 


Mean Corpuscular Volume    93 fL () 


 


Mean Corpuscular Hemoglobin    31 pg (25-35) 


 


Mean Corpuscular Hemoglobin


Concent 


 


 


 33 g/dL


(31-37)


 


Red Cell Distribution Width


 


 


 


 18.5 %


(11.5-14.5)


 


Platelet Count


 


 


 


 497 x10^3/uL


(140-400)


 


Neutrophils (%) (Auto)    76 % (31-73) 


 


Lymphocytes (%) (Auto)    12 % (24-48) 


 


Monocytes (%) (Auto)    11 % (0-9) 


 


Eosinophils (%) (Auto)    0 % (0-3) 


 


Basophils (%) (Auto)    0 % (0-3) 


 


Neutrophils # (Auto)


 


 


 


 5.3 x10^3/uL


(1.8-7.7)


 


Lymphocytes # (Auto)


 


 


 


 0.8 x10^3/uL


(1.0-4.8)


 


Monocytes # (Auto)


 


 


 


 0.8 x10^3/uL


(0.0-1.1)


 


Eosinophils # (Auto)


 


 


 


 0.0 x10^3/uL


(0.0-0.7)


 


Basophils # (Auto)


 


 


 


 0.0 x10^3/uL


(0.0-0.2)


 


Sodium Level


 


 


 


 140 mmol/L


(136-145)


 


Potassium Level


 


 


 


 3.8 mmol/L


(3.5-5.1)


 


Chloride Level


 


 


 


 103 mmol/L


()


 


Carbon Dioxide Level


 


 


 


 27 mmol/L


(21-32)


 


Anion Gap    10 (6-14) 


 


Blood Urea Nitrogen


 


 


 


 65 mg/dL


(7-20)


 


Creatinine


 


 


 


 1.4 mg/dL


(0.6-1.0)


 


Estimated GFR


(Cockcroft-Gault) 


 


 


 40.0 





 


BUN/Creatinine Ratio    46 (6-20) 


 


Glucose Level


 


 


 


 136 mg/dL


(70-99)


 


Calcium Level


 


 


 


 8.4 mg/dL


(8.5-10.1)


 


Magnesium Level


 


 


 


 1.8 mg/dL


(1.8-2.4)


 


Total Bilirubin


 


 


 


 0.5 mg/dL


(0.2-1.0)


 


Aspartate Amino Transf


(AST/SGOT) 


 


 


 21 U/L (15-37) 





 


Alanine Aminotransferase


(ALT/SGPT) 


 


 


 11 U/L (14-59) 





 


Alkaline Phosphatase


 


 


 


 47 U/L


()


 


Total Protein


 


 


 


 5.2 g/dL


(6.4-8.2)


 


Albumin


 


 


 


 2.8 g/dL


(3.4-5.0)


 


Albumin/Globulin Ratio    1.2 (1.0-1.7) 


 


Test


 4/19/20


06:58 


 


 





 


Glucose (Fingerstick)


 126 mg/dL


(70-99) 


 


 











Microbiology


4/15/20 Aerobic and Anaerobic Culture, Resulted


          Pending


4/15/20 Anaerobic Culture Result 1 (ANSON), Resulted


          Pending


4/15/20 Aerobic Culture - Preliminary, Resulted


          


4/15/20 Aerobic Culture Result 1 (ANSON) - Preliminary, Resulted


          


4/15/20 Gram Stain - Final, Resulted


          


4/15/20 Gram Stain Result 1 (ANSON) - Final, Resulted


          


4/15/20 Gram Stain Result 2 (ANSON) - Final, Resulted


          


4/14/20 Blood Culture - Preliminary, Resulted


          NO GROWTH AFTER 4 DAYS


4/12/20 Urine Culture - Final, Complete


          


4/12/20 Urine Culture Result 1 (ANSON) - Final, Complete


Medications





Current Medications


Sodium Chloride 1,000 ml @  1,000 mls/hr Q1H IV  Last administered on 3/16/20at 

03:00;  Start 3/16/20 at 03:00;  Stop 3/16/20 at 03:59;  Status DC


Ondansetron HCl (Zofran) 4 mg 1X  ONCE IVP  Last administered on 3/16/20at 

03:27;  Start 3/16/20 at 03:00;  Stop 3/16/20 at 03:01;  Status DC


Morphine Sulfate (Morphine Sulfate) 4 mg 1X  ONCE IV ;  Start 3/16/20 at 03:00; 

Stop 3/16/20 at 03:01;  Status Cancel


Ketorolac Tromethamine (Toradol 30mg Vial) 30 mg 1X  ONCE IV  Last administered 

on 3/16/20at 02:54;  Start 3/16/20 at 03:00;  Stop 3/16/20 at 03:01;  Status DC


Fentanyl Citrate (Fentanyl 2ml Vial) 25 mcg 1X  ONCE IVP  Last administered on 

3/16/20at 03:23;  Start 3/16/20 at 03:30;  Stop 3/16/20 at 03:31;  Status DC


Fentanyl Citrate (Fentanyl 2ml Vial) 100 mcg STK-MED ONCE .ROUTE ;  Start 

3/16/20 at 03:18;  Stop 3/16/20 at 03:18;  Status DC


Iohexol (Omnipaque 350 Mg/ml) 90 ml 1X  ONCE IV  Last administered on 3/16/20at 

03:25;  Start 3/16/20 at 03:30;  Stop 3/16/20 at 03:31;  Status DC


Info (CONTRAST GIVEN -- Rx MONITORING) 1 each PRN DAILY  PRN MC SEE COMMENTS;  

Start 3/16/20 at 03:30;  Stop 3/18/20 at 03:29;  Status DC


Hydromorphone HCl (Dilaudid) 0.5 mg 1X  ONCE IV  Last administered on 3/16/20at 

03:55;  Start 3/16/20 at 04:30;  Stop 3/16/20 at 04:32;  Status DC


Ondansetron HCl (Zofran) 4 mg PRN Q8HRS  PRN IV NAUSEA/VOMITING 1ST CHOICE;  

Start 3/16/20 at 05:00;  Stop 3/16/20 at 09:27;  Status DC


Morphine Sulfate (Morphine Sulfate) 2 mg PRN Q2HR  PRN IV SEVERE PAIN 7-10 Last 

administered on 3/17/20at 12:26;  Start 3/16/20 at 05:00;  Stop 3/17/20 at 

14:15;  Status DC


Sodium Chloride 1,000 ml @  125 mls/hr Q8H IV  Last administered on 3/16/20at 

20:56;  Start 3/16/20 at 05:00;  Stop 3/17/20 at 04:59;  Status DC


Hydromorphone HCl (Dilaudid) 0.5 mg PRN Q3HRS  PRN IV SEVERE PAIN 7-10 Last 

administered on 3/17/20at 10:06;  Start 3/16/20 at 05:00;  Stop 3/17/20 at 

12:01;  Status DC


Piperacillin Sod/ Tazobactam Sod 4.5 gm/Sodium Chloride 100 ml @  200 mls/hr 1X 

ONCE IV  Last administered on 3/16/20at 05:44;  Start 3/16/20 at 06:00;  Stop 

3/16/20 at 06:29;  Status DC


Ondansetron HCl (Zofran) 4 mg PRN Q4HRS  PRN IV NAUSEA/VOMITING 1ST CHOICE Last 

administered on 4/17/20at 18:01;  Start 3/16/20 at 09:30


Insulin Human Lispro (HumaLOG) 0-9 UNITS Q6HRS SQ  Last administered on 

4/17/20at 12:00;  Start 3/16/20 at 09:30


Dextrose (Dextrose 50%-Water Syringe) 12.5 gm PRN Q15MIN  PRN IV SEE COMMENTS;  

Start 3/16/20 at 09:30


Pantoprazole Sodium (PROTONIX VIAL for IV PUSH) 40 mg DAILYAC IVP  Last 

administered on 4/19/20at 08:15;  Start 3/16/20 at 11:30


Prochlorperazine Edisylate (Compazine) 10 mg PRN Q6HRS  PRN IV NAUSEA/VOMITING, 

2nd CHOICE Last administered on 4/18/20at 14:08;  Start 3/16/20 at 17:45


Atenolol (Tenormin) 100 mg DAILY PO ;  Start 3/17/20 at 09:00;  Stop 3/16/20 at 

20:08;  Status DC


Metoprolol Tartrate (Lopressor Vial) 2.5 mg Q6HRS IVP  Last administered on 

3/17/20at 05:51;  Start 3/16/20 at 20:15;  Stop 3/17/20 at 10:02;  Status DC


Metoprolol Tartrate (Lopressor Vial) 5 mg Q6HRS IVP  Last administered on 

3/26/20at 00:12;  Start 3/17/20 at 10:15;  Stop 3/28/20 at 08:48;  Status DC


Hydromorphone HCl (Dilaudid) 1 mg PRN Q3HRS  PRN IV SEVERE PAIN 7-10 Last 

administered on 3/23/20at 05:13;  Start 3/17/20 at 12:00;  Stop 3/31/20 at 

00:25;  Status DC


Lidocaine HCl (Buffered Lidocaine 1%) 3 ml STK-MED ONCE .ROUTE ;  Start 3/17/20 

at 12:55;  Stop 3/17/20 at 12:56;  Status DC


Albumin Human 500 ml @  125 mls/hr 1X  ONCE IV  Last administered on 3/17/20at 

14:33;  Start 3/17/20 at 14:30;  Stop 3/17/20 at 18:32;  Status DC


Norepinephrine Bitartrate 8 mg/ Dextrose 258 ml @  17.299 mls/ hr CONT  PRN IV 

PER PROTOCOL Last administered on 4/14/20at 12:48;  Start 3/17/20 at 15:30;  Sto

p 4/17/20 at 09:19;  Status DC


Sodium Chloride 1,000 ml @  125 mls/hr Q8H IV  Last administered on 3/17/20at 

21:04;  Start 3/17/20 at 16:00;  Stop 3/18/20 at 02:42;  Status DC


Albumin Human 500 ml @  125 mls/hr PRN BID  PRN IV After every 2L NSS & BP < 90m

m Last administered on 4/1/20at 14:21;  Start 3/17/20 at 16:00


Iohexol (Omnipaque 300 Mg/ml) 60 ml 1X  ONCE IV  Last administered on 3/17/20at 

17:20;  Start 3/17/20 at 17:00;  Stop 3/17/20 at 17:01;  Status DC


Info (CONTRAST GIVEN -- Rx MONITORING) 1 each PRN DAILY  PRN MC SEE COMMENTS;  

Start 3/17/20 at 17:00;  Stop 3/19/20 at 16:59;  Status DC


Meropenem 1 gm/ Sodium Chloride 100 ml @  200 mls/hr Q8HRS IV  Last administered

on 3/18/20at 05:45;  Start 3/17/20 at 20:00;  Stop 3/18/20 at 08:48;  Status DC


Furosemide (Lasix) 40 mg 1X  ONCE IVP  Last administered on 3/17/20at 22:12;  

Start 3/17/20 at 22:30;  Stop 3/17/20 at 22:31;  Status DC


Calcium Chloride 1000 mg/Sodium Chloride 110 ml @  220 mls/hr 1X  ONCE IV  Last 

administered on 3/17/20at 22:11;  Start 3/17/20 at 22:30;  Stop 3/17/20 at 

22:59;  Status DC


Albuterol Sulfate (Ventolin Neb Soln) 2.5 mg 1X  ONCE NEB  Last administered on 

3/18/20at 00:56;  Start 3/17/20 at 22:30;  Stop 3/17/20 at 22:31;  Status DC


Insulin Human Regular (HumuLIN R VIAL) 5 unit 1X  ONCE IV  Last administered on 

3/17/20at 22:14;  Start 3/17/20 at 22:30;  Stop 3/17/20 at 22:31;  Status DC


Magnesium Sulfate 50 ml @ 25 mls/hr 1X  ONCE IV  Last administered on 3/18/20at 

02:57;  Start 3/18/20 at 03:00;  Stop 3/18/20 at 04:59;  Status DC


Calcium Gluconate 1000 mg/Sodium Chloride 110 ml @  220 mls/hr 1X  ONCE IV  Last

administered on 3/18/20at 02:46;  Start 3/18/20 at 03:00;  Stop 3/18/20 at 

03:29;  Status DC


Sodium Chloride 1,000 ml @  200 mls/hr Q5H IV  Last administered on 3/18/20at 

02:46;  Start 3/18/20 at 03:00;  Stop 3/18/20 at 10:21;  Status DC


Calcium Gluconate 1000 mg/Sodium Chloride 110 ml @  220 mls/hr 1X  ONCE IV  Last

administered on 3/18/20at 03:21;  Start 3/18/20 at 03:30;  Stop 3/18/20 at 

03:59;  Status DC


Sodium Bicarbonate 50 meq/Sodium Chloride 1,050 ml @  75 mls/hr Q14H IV  Last 

administered on 3/22/20at 21:10;  Start 3/18/20 at 07:30;  Stop 3/23/20 at 

10:28;  Status DC


Calcium Gluconate 2000 mg/Sodium Chloride 120 ml @  220 mls/hr 1X  ONCE IV  Last

administered on 3/18/20at 09:05;  Start 3/18/20 at 07:30;  Stop 3/18/20 at 

08:02;  Status DC


Lidocaine HCl (Xylocaine-Mpf 1% 2ml Vial) 2 ml STK-MED ONCE .ROUTE ;  Start 

3/18/20 at 08:47;  Stop 3/18/20 at 08:47;  Status DC


Meropenem 500 mg/ Sodium Chloride 50 ml @  100 mls/hr Q12HR IV  Last 

administered on 3/23/20at 21:01;  Start 3/18/20 at 18:00;  Stop 3/24/20 at 

07:58;  Status DC


Lidocaine HCl (Buffered Lidocaine 1%) 3 ml STK-MED ONCE .ROUTE ;  Start 3/18/20 

at 09:46;  Stop 3/18/20 at 09:46;  Status DC


Lidocaine HCl (Buffered Lidocaine 1%) 6 ml 1X  ONCE INJ  Last administered on 

3/18/20at 10:26;  Start 3/18/20 at 10:15;  Stop 3/18/20 at 10:16;  Status DC


Info (Tpn Per Pharmacy) 1 each PRN DAILY  PRN MC SEE COMMENTS Last administered 

on 4/18/20at 13:05;  Start 3/18/20 at 12:00


Sodium Chloride 1,000 ml @  1,000 mls/hr Q1H PRN IV hypotension;  Start 3/18/20 

at 12:07;  Stop 3/18/20 at 18:06;  Status DC


Diphenhydramine HCl (Benadryl) 25 mg 1X PRN  PRN IV ITCHING;  Start 3/18/20 at 

12:15;  Stop 3/19/20 at 12:14;  Status DC


Diphenhydramine HCl (Benadryl) 25 mg 1X PRN  PRN IV ITCHING;  Start 3/18/20 at 

12:15;  Stop 3/19/20 at 12:14;  Status DC


Sodium Chloride 1,000 ml @  400 mls/hr Q2H30M PRN IV PATENCY;  Start 3/18/20 at 

12:07;  Stop 3/19/20 at 00:06;  Status DC


Info (PHARMACY MONITORING -- do not chart) 1 each PRN DAILY  PRN MC SEE 

COMMENTS;  Start 3/18/20 at 12:15;  Stop 3/20/20 at 08:13;  Status DC


Sodium Chloride 90 meq/Calcium Gluconate 10 meq/ Multivitamins 10 ml/Chromium/ 

Copper/Manganese/ Seleni/Zn 1 ml/ Total Parenteral Nutrition/Amino 

Acids/Dextrose/ Fat Emulsion Intravenous 55.005 ml  @ 2.292 mls/hr TPN  CONT IV 

;  Start 3/18/20 at 22:00;  Stop 3/18/20 at 12:33;  Status DC


Info (Tpn Per Pharmacy) 1 each PRN DAILY  PRN MC SEE COMMENTS;  Start 3/18/20 at

12:30;  Status UNV


Sodium Chloride 90 meq/Calcium Gluconate 10 meq/ Multivitamins 10 ml/Chromium/ 

Copper/Manganese/ Seleni/Zn 0.5 ml/ Total Parenteral Nutrition/Amino 

Acids/Dextrose/ Fat Emulsion Intravenous 1,512 ml @  63 mls/hr TPN  CONT IV  

Last administered on 3/18/20at 22:06;  Start 3/18/20 at 22:00;  Stop 3/19/20 at 

21:59;  Status DC


Calcium Carbonate/ Glycine (Tums) 500 mg PRN AFTMEALHC  PRN PO INDIGESTION;  

Start 3/18/20 at 17:45


Calcium Gluconate (Calcium Gluconate) 2,000 mg 1X  ONCE IVP  Last administered 

on 3/19/20at 02:19;  Start 3/19/20 at 02:15;  Stop 3/19/20 at 02:16;  Status DC


Calcium Chloride 3000 mg/Sodium Chloride 1,030 ml @  50 mls/hr [id number] IV  Last 

administered on 3/21/20at 02:17;  Start 3/19/20 at 08:00;  Stop 3/21/20 at 

15:23;  Status DC


Lorazepam (Ativan Inj) 1 mg PRN Q4HRS  PRN IVP ANXIETY / AGITATION, 2nd choic 

Last administered on 4/17/20at 03:51;  Start 3/19/20 at 09:00;  Stop 4/17/20 at 

09:19;  Status DC


Sodium Chloride 1,000 ml @  1,000 mls/hr Q1H PRN IV hypotension;  Start 3/19/20 

at 08:56;  Stop 3/19/20 at 14:55;  Status DC


Albumin Human 200 ml @  200 mls/hr 1X PRN  PRN IV Hypotension;  Start 3/19/20 at

09:00;  Stop 3/19/20 at 14:59;  Status DC


Diphenhydramine HCl (Benadryl) 25 mg 1X PRN  PRN IV ITCHING;  Start 3/19/20 at 

09:00;  Stop 3/20/20 at 08:59;  Status DC


Diphenhydramine HCl (Benadryl) 25 mg 1X PRN  PRN IV ITCHING;  Start 3/19/20 at 

09:00;  Stop 3/20/20 at 08:59;  Status DC


Sodium Chloride 1,000 ml @  400 mls/hr Q2H30M PRN IV PATENCY;  Start 3/19/20 at 

08:56;  Stop 3/19/20 at 20:55;  Status DC


Info (PHARMACY MONITORING -- do not chart) 1 each PRN DAILY  PRN MC SEE 

COMMENTS;  Start 3/19/20 at 09:00;  Status UNV


Info (PHARMACY MONITORING -- do not chart) 1 each PRN DAILY  PRN MC SEE 

COMMENTS;  Start 3/19/20 at 09:00;  Stop 3/20/20 at 08:13;  Status DC


Digoxin (Lanoxin) 500 mcg 1X  ONCE IV  Last administered on 3/19/20at 10:04;  

Start 3/19/20 at 10:00;  Stop 3/19/20 at 10:01;  Status DC


Digoxin (Lanoxin) 125 mcg 1X  ONCE IV  Last administered on 3/19/20at 17:10;  

Start 3/19/20 at 18:00;  Stop 3/19/20 at 18:01;  Status DC


Magnesium Sulfate 100 ml @  25 mls/hr 1X  ONCE IV  Last administered on 

3/19/20at 12:48;  Start 3/19/20 at 13:00;  Stop 3/19/20 at 16:59;  Status DC


Sodium Chloride 90 meq/Magnesium Sulfate 10 meq/ Calcium Gluconate 20 meq/ 

Multivitamins 10 ml/Chromium/ Copper/Manganese/ Seleni/Zn 0.5 ml/ Total 

Parenteral Nutrition/Amino Acids/Dextrose/ Fat Emulsion Intravenous 1,512 ml @  

63 mls/hr TPN  CONT IV  Last administered on 3/19/20at 22:25;  Start 3/19/20 at 

22:00;  Stop 3/20/20 at 21:59;  Status DC


Sodium Chloride 1,000 ml @  1,000 mls/hr Q1H PRN IV hypotension;  Start 3/20/20 

at 08:05;  Stop 3/20/20 at 14:04;  Status DC


Albumin Human 200 ml @  200 mls/hr 1X  ONCE IV  Last administered on 3/20/20at 

08:57;  Start 3/20/20 at 08:15;  Stop 3/20/20 at 09:14;  Status DC


Diphenhydramine HCl (Benadryl) 25 mg 1X PRN  PRN IV ITCHING;  Start 3/20/20 at 

08:15;  Stop 3/21/20 at 08:14;  Status DC


Diphenhydramine HCl (Benadryl) 25 mg 1X PRN  PRN IV ITCHING;  Start 3/20/20 at 

08:15;  Stop 3/21/20 at 08:14;  Status DC


Sodium Chloride 1,000 ml @  400 mls/hr Q2H30M PRN IV PATENCY;  Start 3/20/20 at 

08:05;  Stop 3/20/20 at 20:04;  Status DC


Info (PHARMACY MONITORING -- do not chart) 1 each PRN DAILY  PRN MC SEE 

COMMENTS;  Start 3/20/20 at 08:15;  Stop 3/24/20 at 07:57;  Status DC


Sodium Chloride 90 meq/Potassium Chloride 15 meq/ Potassium Phosphate 10 mmol/ 

Magnesium Sulfate 10 meq/Calcium Gluconate 20 meq/ Multivitamins 10 ml/Chromium/

Copper/Manganese/ Seleni/Zn 0.5 ml/ Total Parenteral Nutrition/Amino Acids/Dext

verenice/ Fat Emulsion Intravenous 1,512 ml @  63 mls/hr TPN  CONT IV  Last 

administered on 3/20/20at 21:01;  Start 3/20/20 at 22:00;  Stop 3/21/20 at 

21:59;  Status DC


Potassium Chloride/Water 100 ml @  100 mls/hr 1X  ONCE IV  Last administered on 

3/20/20at 14:09;  Start 3/20/20 at 14:00;  Stop 3/20/20 at 14:59;  Status DC


Benzocaine (Hurricaine One) 1 spray 1X  ONCE MM  Last administered on 3/20/20at 

16:38;  Start 3/20/20 at 14:30;  Stop 3/20/20 at 14:31;  Status DC


Lidocaine HCl (Glydo (Lidocaine) Jelly) 1 thomas 1X  ONCE MM  Last administered on 

3/20/20at 16:38;  Start 3/20/20 at 14:30;  Stop 3/20/20 at 14:31;  Status DC


Linezolid/Dextrose 300 ml @  300 mls/hr Q12HR IV  Last administered on 3/26/20at

21:04;  Start 3/20/20 at 20:00;  Stop 3/27/20 at 07:50;  Status DC


Acetaminophen (Tylenol) 650 mg PRN Q6HRS  PRN PO MILD PAIN / TEMP;  Start 

3/21/20 at 03:30;  Stop 3/21/20 at 03:36;  Status DC


Acetaminophen (Tylenol) 650 mg PRN Q6HRS  PRN PEG MILD PAIN / TEMP Last 

administered on 4/16/20at 19:56;  Start 3/21/20 at 03:36


Sodium Chloride 1,000 ml @  1,000 mls/hr Q1H PRN IV hypotension;  Start 3/21/20 

at 07:50;  Stop 3/21/20 at 13:49;  Status DC


Albumin Human 200 ml @  200 mls/hr 1X PRN  PRN IV Hypotension;  Start 3/21/20 at

08:00;  Stop 3/21/20 at 13:59;  Status DC


Sodium Chloride (Normal Saline Flush) 10 ml 1X PRN  PRN IV AP catheter pack;  

Start 3/21/20 at 08:00;  Stop 3/22/20 at 07:59;  Status DC


Sodium Chloride (Normal Saline Flush) 10 ml 1X PRN  PRN IV  catheter pack;  

Start 3/21/20 at 08:00;  Stop 3/22/20 at 07:59;  Status DC


Sodium Chloride 1,000 ml @  400 mls/hr Q2H30M PRN IV PATENCY;  Start 3/21/20 at 

07:50;  Stop 3/21/20 at 19:49;  Status DC


Info (PHARMACY MONITORING -- do not chart) 1 each PRN DAILY  PRN MC SEE 

COMMENTS;  Start 3/21/20 at 08:00;  Status UNV


Info (PHARMACY MONITORING -- do not chart) 1 each PRN DAILY  PRN MC SEE 

COMMENTS;  Start 3/21/20 at 08:00;  Stop 3/23/20 at 08:25;  Status DC


Sodium Chloride 90 meq/Potassium Chloride 15 meq/ Potassium Phosphate 10 mmol/ 

Magnesium Sulfate 10 meq/Calcium Gluconate 20 meq/ Multivitamins 10 ml/Chromium/

Copper/Manganese/ Seleni/Zn 0.5 ml/ Total Parenteral Nutrition/Amino 

Acids/Dextrose/ Fat Emulsion Intravenous 1,512 ml @  63 mls/hr TPN  CONT IV  

Last administered on 3/21/20at 20:57;  Start 3/21/20 at 22:00;  Stop 3/22/20 at 

21:59;  Status DC


Sodium Chloride 90 meq/Potassium Chloride 15 meq/ Potassium Phosphate 15 mmol/ 

Magnesium Sulfate 10 meq/Calcium Gluconate 20 meq/ Multivitamins 10 ml/Chromium/

Copper/Manganese/ Seleni/Zn 0.5 ml/ Total Parenteral Nutrition/Amino 

Acids/Dextrose/ Fat Emulsion Intravenous 1,512 ml @  63 mls/hr TPN  CONT IV ;  

Start 3/22/20 at 22:00;  Stop 3/22/20 at 14:16;  Status DC


Sodium Chloride 90 meq/Potassium Chloride 15 meq/ Potassium Phosphate 15 mmol/ 

Magnesium Sulfate 10 meq/Calcium Gluconate 20 meq/ Multivitamins 10 ml/Chromium/

Copper/Manganese/ Seleni/Zn 0.5 ml/ Total Parenteral Nutrition/Amino Acids/Dex

trose/ Fat Emulsion Intravenous 1,200 ml @  50 mls/hr TPN  CONT IV ;  Start 

3/22/20 at 22:00;  Stop 3/22/20 at 14:17;  Status DC


Sodium Chloride 90 meq/Potassium Chloride 15 meq/ Potassium Phosphate 10 mmol/ 

Magnesium Sulfate 10 meq/Calcium Gluconate 20 meq/ Multivitamins 10 ml/Chromium/

Copper/Manganese/ Seleni/Zn 0.5 ml/ Total Parenteral Nutrition/Amino 

Acids/Dextrose/ Fat Emulsion Intravenous 1,200 ml @  50 mls/hr TPN  CONT IV  

Last administered on 3/22/20at 23:29;  Start 3/22/20 at 22:00;  Stop 3/23/20 at 

21:59;  Status DC


Sodium Chloride 1,000 ml @  1,000 mls/hr Q1H PRN IV hypotension;  Start 3/23/20 

at 07:28;  Stop 3/23/20 at 13:27;  Status DC


Albumin Human 200 ml @  200 mls/hr 1X  ONCE IV  Last administered on 3/23/20at 

08:51;  Start 3/23/20 at 07:30;  Stop 3/23/20 at 08:29;  Status DC


Diphenhydramine HCl (Benadryl) 25 mg 1X PRN  PRN IV ITCHING;  Start 3/23/20 at 

07:30;  Stop 3/24/20 at 07:29;  Status DC


Diphenhydramine HCl (Benadryl) 25 mg 1X PRN  PRN IV ITCHING;  Start 3/23/20 at 

07:30;  Stop 3/24/20 at 07:29;  Status DC


Sodium Chloride 1,000 ml @  400 mls/hr Q2H30M PRN IV PATENCY;  Start 3/23/20 at 

07:28;  Stop 3/23/20 at 19:27;  Status DC


Info (PHARMACY MONITORING -- do not chart) 1 each PRN DAILY  PRN MC SEE 

COMMENTS;  Start 3/23/20 at 07:30;  Stop 4/3/20 at 13:01;  Status DC


Metronidazole 100 ml @  100 mls/hr Q6HRS IV  Last administered on 4/8/20at 

06:26;  Start 3/23/20 at 08:30;  Stop 4/8/20 at 09:58;  Status DC


Micafungin Sodium 100 mg/Dextrose 100 ml @  100 mls/hr Q24H IV  Last 

administered on 4/19/20at 08:15;  Start 3/23/20 at 09:00


Propofol 0 ml @ As Directed STK-MED ONCE IV ;  Start 3/23/20 at 07:53;  Stop 

3/23/20 at 07:53;  Status DC


Etomidate (Amidate) 20 mg STK-MED ONCE IV ;  Start 3/23/20 at 07:53;  Stop 

3/23/20 at 07:54;  Status DC


Midazolam HCl (Versed) 5 mg STK-MED ONCE .ROUTE ;  Start 3/23/20 at 07:57;  Stop

3/23/20 at 07:57;  Status DC


Fentanyl Citrate 30 ml @ 0 mls/hr CONT  PRN IV SEE PROTOCOL Last administered on

4/17/20at 06:12;  Start 3/23/20 at 08:15;  Stop 4/17/20 at 09:19;  Status DC


Artificial Tears (Artificial Tears) 1 drop PRN Q1HR  PRN OU DRY EYE, 1st choice;

 Start 3/23/20 at 08:15


Midazolam HCl 50 mg/Sodium Chloride 50 ml @ 0 mls/hr CONT  PRN IV SEE PROTOCOL 

Last administered on 3/26/20at 22:39;  Start 3/23/20 at 08:15;  Stop 3/28/20 at 

15:59;  Status DC


Etomidate (Amidate) 8 mg 1X  ONCE IV  Last administered on 3/23/20at 08:33;  

Start 3/23/20 at 08:30;  Stop 3/23/20 at 08:31;  Status DC


Succinylcholine Chloride (Anectine) 120 mg 1X  ONCE IV  Last administered on 

3/23/20at 08:34;  Start 3/23/20 at 08:30;  Stop 3/23/20 at 08:31;  Status DC


Midazolam HCl (Versed) 5 mg 1X  ONCE IV ;  Start 3/23/20 at 08:30;  Stop 3/23/20

at 08:31;  Status DC


Potassium Chloride 15 meq/ Bicarbonate Dialysis Soln w/ out KCl 5,007.5 ml  @ 

1,000 mls/ hr Q5H1M IV  Last administered on 3/24/20at 11:11;  Start 3/23/20 at 

12:00;  Stop 3/24/20 at 11:15;  Status DC


Potassium Chloride 15 meq/ Bicarbonate Dialysis Soln w/ out KCl 5,007.5 ml  @ 

1,000 mls/ hr Q5H1M IV  Last administered on 3/24/20at 11:12;  Start 3/23/20 at 

12:00;  Stop 3/24/20 at 11:17;  Status DC


Potassium Chloride 15 meq/ Bicarbonate Dialysis Soln w/ out KCl 5,007.5 ml  @ 

1,000 mls/ hr Q5H1M IV  Last administered on 3/24/20at 11:11;  Start 3/23/20 at 

12:00;  Stop 3/24/20 at 11:19;  Status DC


Sodium Chloride 90 meq/Potassium Chloride 15 meq/ Potassium Phosphate 10 mmol/ 

Magnesium Sulfate 10 meq/Calcium Gluconate 20 meq/ Multivitamins 10 ml/Chromium/

Copper/Manganese/ Seleni/Zn 0.5 ml/ Total Parenteral Nutrition/Amino 

Acids/Dextrose/ Fat Emulsion Intravenous 1,400 ml @  58.333 mls/ hr TPN  CONT IV

 Last administered on 3/23/20at 21:42;  Start 3/23/20 at 22:00;  Stop 3/24/20 at

21:59;  Status DC


Heparin Sodium (Porcine) (Heparin Sodium) 5,000 unit Q8HRS SQ  Last administered

on 3/28/20at 05:55;  Start 3/23/20 at 15:00;  Stop 3/28/20 at 13:28;  Status DC


Meropenem 500 mg/ Sodium Chloride 50 ml @  100 mls/hr Q6HRS IV  Last a

dministered on 3/25/20at 06:00;  Start 3/24/20 at 09:00;  Stop 3/25/20 at 07:29;

 Status DC


Potassium Phosphate 20 mmol/ Sodium Chloride 106.6667 ml @  51.667 m... 1X  ONCE

IV  Last administered on 3/24/20at 11:22;  Start 3/24/20 at 10:15;  Stop 3/24/20

at 12:18;  Status DC


Acetaminophen (Tylenol Supp) 650 mg PRN Q6HRS  PRN ME MILD PAIN / TEMP Last 

administered on 4/17/20at 10:25;  Start 3/24/20 at 10:30


Potassium Chloride/Water 100 ml @  100 mls/hr Q1H IV  Last administered on 

3/24/20at 12:12;  Start 3/24/20 at 11:00;  Stop 3/24/20 at 12:59;  Status DC


Potassium Chloride 20 meq/ Bicarbonate Dialysis Soln w/ out KCl 5,010 ml @  1,00

0 mls/hr Q5H1M IV  Last administered on 3/25/20at 08:48;  Start 3/24/20 at 

12:00;  Stop 3/25/20 at 13:03;  Status DC


Potassium Chloride 20 meq/ Bicarbonate Dialysis Soln w/ out KCl 5,010 ml @  

1,000 mls/hr Q5H1M IV  Last administered on 3/29/20at 14:52;  Start 3/24/20 at 

11:30;  Stop 3/29/20 at 19:59;  Status DC


Potassium Chloride 20 meq/ Bicarbonate Dialysis Soln w/ out KCl 5,010 ml @  

1,000 mls/hr Q5H1M IV  Last administered on 3/29/20at 14:53;  Start 3/24/20 at 

11:30;  Stop 3/29/20 at 19:59;  Status DC


Sodium Chloride 90 meq/Potassium Chloride 15 meq/ Potassium Phosphate 15 mmol/ 

Magnesium Sulfate 10 meq/Calcium Gluconate 15 meq/ Multivitamins 10 ml/Chromium/

Copper/Manganese/ Seleni/Zn 0.5 ml/ Total Parenteral Nutrition/Amino 

Acids/Dextrose/ Fat Emulsion Intravenous 1,400 ml @  58.333 mls/ hr TPN  CONT IV

 Last administered on 3/24/20at 22:17;  Start 3/24/20 at 22:00;  Stop 3/25/20 at

21:59;  Status DC


Cefepime HCl (Maxipime) 2 gm Q12HR IVP  Last administered on 4/7/20at 20:56;  

Start 3/25/20 at 09:00;  Stop 4/8/20 at 09:58;  Status DC


Daptomycin 500 mg/ Sodium Chloride 50 ml @  100 mls/hr Q48H IV  Last 

administered on 4/10/20at 09:57;  Start 3/25/20 at 08:30;  Stop 4/10/20 at 

10:07;  Status DC


Lidocaine HCl (Buffered Lidocaine 1%) 3 ml 1X  ONCE INJ  Last administered on 

3/25/20at 10:27;  Start 3/25/20 at 10:30;  Stop 3/25/20 at 10:31;  Status DC


Potassium Phosphate 20 mmol/ Sodium Chloride 106.6667 ml @  51.667 m... 1X  ONCE

IV  Last administered on 3/25/20at 12:51;  Start 3/25/20 at 13:00;  Stop 3/25/20

at 15:03;  Status DC


Sodium Chloride 90 meq/Potassium Chloride 15 meq/ Potassium Phosphate 18 mmol/ 

Magnesium Sulfate 8 meq/Calcium Gluconate 15 meq/ Multivitamins 10 ml/Chromium/ 

Copper/Manganese/ Seleni/Zn 0.5 ml/ Total Parenteral Nutrition/Amino Acids/De

xtrose/ Fat Emulsion Intravenous 1,400 ml @  58.333 mls/ hr TPN  CONT IV  Last 

administered on 3/25/20at 22:16;  Start 3/25/20 at 22:00;  Stop 3/26/20 at 

21:59;  Status DC


Potassium Chloride 20 meq/ Bicarbonate Dialysis Soln w/ out KCl 5,010 ml @  

1,000 mls/hr Q5H1M IV  Last administered on 3/29/20at 14:54;  Start 3/25/20 at 

16:00;  Stop 3/29/20 at 19:59;  Status DC


Multi-Ingred Cream/Lotion/Oil/ Oint (Artificial Tears Eye Ointment) 1 thomas PRN 

Q1HR  PRN OU DRY EYE, 2nd choice Last administered on 4/13/20at 08:19;  Start 

3/25/20 at 17:30


Sodium Chloride 90 meq/Potassium Chloride 15 meq/ Potassium Phosphate 18 mmol/ 

Magnesium Sulfate 8 meq/Calcium Gluconate 15 meq/ Multivitamins 10 ml/Chromium/ 

Copper/Manganese/ Seleni/Zn 0.5 ml/ Total Parenteral Nutrition/Amino 

Acids/Dextrose/ Fat Emulsion Intravenous 1,400 ml @  58.333 mls/ hr TPN  CONT IV

 Last administered on 3/26/20at 22:00;  Start 3/26/20 at 22:00;  Stop 3/27/20 at

21:59;  Status DC


Albumin Human 500 ml @  125 mls/hr 1X  ONCE IV ;  Start 3/26/20 at 14:15;  Stop 

3/26/20 at 18:14;  Status DC


Sodium Chloride 90 meq/Potassium Chloride 15 meq/ Potassium Phosphate 18 mmol/ 

Magnesium Sulfate 8 meq/Calcium Gluconate 15 meq/ Multivitamins 10 ml/Chromium/ 

Copper/Manganese/ Seleni/Zn 0.5 ml/ Insulin Human Regular 10 unit/ Total 

Parenteral Nutrition/Amino Acids/Dextrose/ Fat Emulsion Intravenous 1,400 ml @  

58.333 mls/ hr TPN  CONT IV  Last administered on 3/27/20at 21:43;  Start 

3/27/20 at 22:00;  Stop 3/28/20 at 21:59;  Status DC


Lidocaine HCl (Buffered Lidocaine 1%) 3 ml STK-MED ONCE .ROUTE ;  Start 3/25/20 

at 10:00;  Stop 3/27/20 at 13:57;  Status DC


Midazolam HCl 100 mg/Sodium Chloride 100 ml @ 7 mls/hr CONT  PRN IV SEE PROTOCOL

Last administered on 4/8/20at 15:35;  Start 3/28/20 at 16:00


Sodium Chloride 90 meq/Potassium Chloride 15 meq/ Potassium Phosphate 18 mmol/ 

Magnesium Sulfate 8 meq/Calcium Gluconate 15 meq/ Multivitamins 10 ml/Chromium/ 

Copper/Manganese/ Seleni/Zn 0.5 ml/ Insulin Human Regular 15 unit/ Total 

Parenteral Nutrition/Amino Acids/Dextrose/ Fat Emulsion Intravenous 1,400 ml @  

58.333 mls/ hr TPN  CONT IV  Last administered on 3/28/20at 20:34;  Start 

3/28/20 at 22:00;  Stop 3/29/20 at 21:59;  Status DC


Info ([facility] Electrolyte Protocol) 1 ea CONT PRN  PRN MC PER PROTOCOL;  Start 

3/29/20 at 13:15


Sodium Chloride 90 meq/Potassium Chloride 15 meq/ Potassium Phosphate 18 mmol/ 

Magnesium Sulfate 8 meq/Calcium Gluconate 15 meq/ Multivitamins 10 ml/Chromium/ 

Copper/Manganese/ Seleni/Zn 0.5 ml/ Insulin Human Regular 15 unit/ Total 

Parenteral Nutrition/Amino Acids/Dextrose/ Fat Emulsion Intravenous 1,400 ml @  

58.333 mls/ hr TPN  CONT IV  Last administered on 3/29/20at 22:05;  Start 3/29/2

0 at 22:00;  Stop 3/30/20 at 21:59;  Status DC


Potassium Chloride 15 meq/ Bicarbonate Dialysis Soln w/ out KCl 5,007.5 ml  @ 

1,000 mls/ hr Q5H1M IV  Last administered on 4/1/20at 18:14;  Start 3/29/20 at 

20:00;  Stop 4/2/20 at 13:08;  Status DC


Potassium Chloride 15 meq/ Bicarbonate Dialysis Soln w/ out KCl 5,007.5 ml  @ 

1,000 mls/ hr Q5H1M IV  Last administered on 4/1/20at 18:14;  Start 3/29/20 at 

20:00;  Stop 4/2/20 at 13:08;  Status DC


Potassium Chloride 15 meq/ Bicarbonate Dialysis Soln w/ out KCl 5,007.5 ml  @ 

1,000 mls/ hr Q5H1M IV  Last administered on 4/1/20at 18:14;  Start 3/29/20 at 

20:00;  Stop 4/2/20 at 13:08;  Status DC


Iohexol (Omnipaque 240 Mg/ml) 30 ml 1X  ONCE PO  Last administered on 3/30/20at 

11:30;  Start 3/30/20 at 11:30;  Stop 3/30/20 at 11:33;  Status DC


Info (CONTRAST GIVEN -- Rx MONITORING) 1 each PRN DAILY  PRN MC SEE COMMENTS;  

Start 3/30/20 at 11:45;  Stop 4/1/20 at 11:44;  Status DC


Sodium Chloride 90 meq/Potassium Chloride 15 meq/ Potassium Phosphate 18 mmol/ 

Magnesium Sulfate 8 meq/Calcium Gluconate 15 meq/ Multivitamins 10 ml/Chromium/ 

Copper/Manganese/ Seleni/Zn 0.5 ml/ Insulin Human Regular 15 unit/ Total 

Parenteral Nutrition/Amino Acids/Dextrose/ Fat Emulsion Intravenous 1,400 ml @  

58.333 mls/ hr TPN  CONT IV  Last administered on 3/30/20at 21:47;  Start 

3/30/20 at 22:00;  Stop 3/31/20 at 21:59;  Status DC


Sodium Chloride 90 meq/Potassium Chloride 15 meq/ Potassium Phosphate 18 mmol/ 

Magnesium Sulfate 8 meq/Calcium Gluconate 15 meq/ Multivitamins 10 ml/Chromium/ 

Copper/Manganese/ Seleni/Zn 0.5 ml/ Insulin Human Regular 20 unit/ Total 

Parenteral Nutrition/Amino Acids/Dextrose/ Fat Emulsion Intravenous 1,400 ml @  

58.333 mls/ hr TPN  CONT IV  Last administered on 3/31/20at 21:36;  Start 

3/31/20 at 22:00;  Stop 4/1/20 at 21:59;  Status DC


Alteplase, Recombinant (Cathflo For Central Catheter Clearance) 1 mg 1X  ONCE 

INT CAT  Last administered on 3/31/20at 20:03;  Start 3/31/20 at 19:30;  Stop 

3/31/20 at 19:46;  Status DC


Alteplase, Recombinant (Cathflo For Central Catheter Clearance) 1 mg 1X  ONCE 

INT CAT  Last administered on 3/31/20at 22:05;  Start 3/31/20 at 22:00;  Stop 

3/31/20 at 22:01;  Status DC


Sodium Chloride 90 meq/Potassium Chloride 15 meq/ Potassium Phosphate 18 mmol/ 

Magnesium Sulfate 8 meq/Calcium Gluconate 15 meq/ Multivitamins 10 ml/Chromium/ 

Copper/Manganese/ Seleni/Zn 0.5 ml/ Insulin Human Regular 20 unit/ Total 

Parenteral Nutrition/Amino Acids/Dextrose/ Fat Emulsion Intravenous 1,400 ml @  

58.333 mls/ hr TPN  CONT IV  Last administered on 4/1/20at 21:30;  Start 4/1/20 

at 22:00;  Stop 4/2/20 at 21:59;  Status DC


Dexmedetomidine HCl 400 mcg/ Sodium Chloride 100 ml @ 0 mls/hr CONT  PRN IV ANXI

ETY / AGITATION Last administered on 4/19/20at 07:36;  Start 4/2/20 at 08:15


Sodium Chloride 500 ml @  500 mls/hr 1X PRN  PRN IV ELEVATED BP, SEE COMMENTS;  

Start 4/2/20 at 08:15


Atropine Sulfate (ATROPINE 0.5mg SYRINGE) 0.5 mg PRN Q5MIN  PRN IV SEE COMMENTS;

 Start 4/2/20 at 08:15


Furosemide (Lasix) 20 mg 1X  ONCE IVP  Last administered on 4/2/20at 08:19;  

Start 4/2/20 at 08:15;  Stop 4/2/20 at 08:16;  Status DC


Lidocaine HCl (Buffered Lidocaine 1%) 3 ml STK-MED ONCE .ROUTE ;  Start 4/2/20 

at 08:39;  Stop 4/2/20 at 08:39;  Status DC


Lidocaine HCl (Buffered Lidocaine 1%) 6 ml 1X  ONCE INJ  Last administered on 

4/2/20at 09:05;  Start 4/2/20 at 09:00;  Stop 4/2/20 at 09:06;  Status DC


Sodium Chloride 90 meq/Potassium Chloride 15 meq/ Potassium Phosphate 18 mmol/ 

Magnesium Sulfate 8 meq/Calcium Gluconate 15 meq/ Multivitamins 10 ml/Chromium/ 

Copper/Manganese/ Seleni/Zn 0.5 ml/ Insulin Human Regular 20 unit/ Total 

Parenteral Nutrition/Amino Acids/Dextrose/ Fat Emulsion Intravenous 1,400 ml @  

58.333 mls/ hr TPN  CONT IV  Last administered on 4/2/20at 22:45;  Start 4/2/20 

at 22:00;  Stop 4/3/20 at 21:59;  Status DC


Sodium Chloride 1,000 ml @  1,000 mls/hr Q1H PRN IV hypotension;  Start 4/3/20 

at 07:30;  Stop 4/3/20 at 13:29;  Status DC


Albumin Human 200 ml @  200 mls/hr 1X PRN  PRN IV Hypotension Last administered 

on 4/3/20at 09:36;  Start 4/3/20 at 07:30;  Stop 4/3/20 at 13:29;  Status DC


Sodium Chloride (Normal Saline Flush) 10 ml 1X PRN  PRN IV AP catheter pack;  

Start 4/3/20 at 07:30;  Stop 4/3/20 at 21:29;  Status DC


Sodium Chloride (Normal Saline Flush) 10 ml 1X PRN  PRN IV  catheter pack;  

Start 4/3/20 at 07:30;  Stop 4/4/20 at 07:29;  Status DC


Sodium Chloride 1,000 ml @  400 mls/hr Q2H30M PRN IV PATENCY;  Start 4/3/20 at 

07:30;  Stop 4/3/20 at 19:29;  Status DC


Info (PHARMACY MONITORING -- do not chart) 1 each PRN DAILY  PRN MC SEE 

COMMENTS;  Start 4/3/20 at 07:30;  Stop 4/3/20 at 13:02;  Status DC


Info (PHARMACY MONITORING -- do not chart) 1 each PRN DAILY  PRN MC SEE 

COMMENTS;  Start 4/3/20 at 07:30;  Stop 4/5/20 at 12:45;  Status DC


Sodium Chloride 90 meq/Potassium Chloride 15 meq/ Potassium Phosphate 10 mmol/ 

Magnesium Sulfate 8 meq/Calcium Gluconate 15 meq/ Multivitamins 10 ml/Chromium/ 

Copper/Manganese/ Seleni/Zn 0.5 ml/ Insulin Human Regular 25 unit/ Total 

Parenteral Nutrition/Amino Acids/Dextrose/ Fat Emulsion Intravenous 1,400 ml @  

58.333 mls/ hr TPN  CONT IV  Last administered on 4/3/20at 22:19;  Start 4/3/20 

at 22:00;  Stop 4/4/20 at 21:59;  Status DC


Heparin Sodium (Porcine) (Heparin Sodium) 5,000 unit Q12HR SQ  Last administered

on 4/19/20at 08:23;  Start 4/3/20 at 21:00


Ondansetron HCl (Zofran) 4 mg PRN Q6HRS  PRN IV NAUSEA/VOMITING;  Start 4/6/20 

at 07:00;  Stop 4/7/20 at 06:59;  Status DC


Fentanyl Citrate (Fentanyl 2ml Vial) 25 mcg PRN Q5MIN  PRN IV MILD PAIN 1-3;  

Start 4/6/20 at 07:00;  Stop 4/7/20 at 06:59;  Status DC


Fentanyl Citrate (Fentanyl 2ml Vial) 50 mcg PRN Q5MIN  PRN IV MODERATE TO SEVERE

PAIN;  Start 4/6/20 at 07:00;  Stop 4/7/20 at 06:59;  Status DC


Ringer's Solution 1,000 ml @  30 mls/hr Q24H IV ;  Start 4/6/20 at 07:00;  Stop 

4/6/20 at 18:59;  Status DC


Lidocaine HCl (Xylocaine-Mpf 1% 2ml Vial) 2 ml PRN 1X  PRN ID PRIOR TO IV START;

 Start 4/6/20 at 07:00;  Stop 4/7/20 at 06:59;  Status DC


Prochlorperazine Edisylate (Compazine) 5 mg PACU PRN  PRN IV NAUSEA, MRX1;  

Start 4/6/20 at 07:00;  Stop 4/7/20 at 06:59;  Status DC


Sodium Chloride 1,000 ml @  1,000 mls/hr Q1H PRN IV hypotension;  Start 4/4/20 

at 09:10;  Stop 4/4/20 at 15:09;  Status DC


Albumin Human 200 ml @  200 mls/hr 1X PRN  PRN IV Hypotension Last administered 

on 4/4/20at 10:10;  Start 4/4/20 at 09:15;  Stop 4/4/20 at 15:14;  Status DC


Sodium Chloride 1,000 ml @  400 mls/hr Q2H30M PRN IV PATENCY;  Start 4/4/20 at 

09:10;  Stop 4/4/20 at 21:09;  Status DC


Info (PHARMACY MONITORING -- do not chart) 1 each PRN DAILY  PRN MC SEE 

COMMENTS;  Start 4/4/20 at 09:15;  Stop 4/5/20 at 12:45;  Status DC


Info (PHARMACY MONITORING -- do not chart) 1 each PRN DAILY  PRN MC SEE 

COMMENTS;  Start 4/4/20 at 09:15;  Stop 4/5/20 at 12:45;  Status DC


Sodium Chloride 90 meq/Potassium Chloride 15 meq/ Potassium Phosphate 10 mmol/ 

Magnesium Sulfate 8 meq/Calcium Gluconate 15 meq/ Multivitamins 10 ml/Chromium/ 

Copper/Manganese/ Seleni/Zn 0.5 ml/ Insulin Human Regular 25 unit/ Total 

Parenteral Nutrition/Amino Acids/Dextrose/ Fat Emulsion Intravenous 1,400 ml @  

58.333 mls/ hr TPN  CONT IV  Last administered on 4/4/20at 22:10;  Start 4/4/20 

at 22:00;  Stop 4/5/20 at 21:59;  Status DC


Magnesium Sulfate 50 ml @ 25 mls/hr PRN DAILY  PRN IV for Mag < 1.7 on am labs; 

Start 4/5/20 at 09:15


Sodium Chloride 90 meq/Potassium Chloride 15 meq/ Potassium Phosphate 10 mmol/ 

Magnesium Sulfate 8 meq/Calcium Gluconate 15 meq/ Multivitamins 10 ml/Chromium/ 

Copper/Manganese/ Seleni/Zn 0.5 ml/ Insulin Human Regular 25 unit/ Total 

Parenteral Nutrition/Amino Acids/Dextrose/ Fat Emulsion Intravenous 1,400 ml @  

58.333 mls/ hr TPN  CONT IV  Last administered on 4/5/20at 21:20;  Start 4/5/20 

at 22:00;  Stop 4/6/20 at 21:59;  Status DC


Sodium Chloride 1,000 ml @  1,000 mls/hr Q1H PRN IV hypotension;  Start 4/5/20 

at 12:23;  Stop 4/5/20 at 18:22;  Status DC


Albumin Human 200 ml @  200 mls/hr 1X  ONCE IV  Last administered on 4/5/20at 

13:34;  Start 4/5/20 at 12:30;  Stop 4/5/20 at 13:29;  Status DC


Diphenhydramine HCl (Benadryl) 25 mg 1X PRN  PRN IV ITCHING;  Start 4/5/20 at 

12:30;  Stop 4/6/20 at 12:29;  Status DC


Diphenhydramine HCl (Benadryl) 25 mg 1X PRN  PRN IV ITCHING;  Start 4/5/20 at 

12:30;  Stop 4/6/20 at 12:29;  Status DC


Info (PHARMACY MONITORING -- do not chart) 1 each PRN DAILY  PRN MC SEE 

COMMENTS;  Start 4/5/20 at 12:30;  Status Cancel


Bupivacaine HCl/ Epinephrine Bitart (Sensorcain-Epi 0.5%-1:[id number] Mpf) 30 ml 

STK-MED ONCE .ROUTE  Last administered on 4/6/20at 11:44;  Start 4/6/20 at 

11:00;  Stop 4/6/20 at 11:01;  Status DC


Cellulose (Surgicel Fibrillar 1x2) 1 each STK-MED ONCE .ROUTE ;  Start 4/6/20 at

11:00;  Stop 4/6/20 at 11:01;  Status DC


Sodium Chloride 90 meq/Potassium Chloride 15 meq/ Potassium Phosphate 10 mmol/ 

Magnesium Sulfate 12 meq/Calcium Gluconate 15 meq/ Multivitamins 10 ml/Chromium/

Copper/Manganese/ Seleni/Zn 0.5 ml/ Insulin Human Regular 25 unit/ Total 

Parenteral Nutrition/Amino Acids/Dextrose/ Fat Emulsion Intravenous 1,400 ml @  

58.333 mls/ hr TPN  CONT IV  Last administered on 4/6/20at 22:24;  Start 4/6/20 

at 22:00;  Stop 4/7/20 at 21:59;  Status DC


Propofol 20 ml @ As Directed STK-MED ONCE IV ;  Start 4/6/20 at 11:07;  Stop 

4/6/20 at 11:07;  Status DC


Cellulose (Surgicel Hemostat 4x8) 1 each STK-MED ONCE .ROUTE  Last administered 

on 4/6/20at 11:44;  Start 4/6/20 at 11:55;  Stop 4/6/20 at 11:56;  Status DC


Sevoflurane (Ultane) 60 ml STK-MED ONCE IH ;  Start 4/6/20 at 12:46;  Stop 

4/6/20 at 12:46;  Status DC


Sodium Chloride 1,000 ml @  1,000 mls/hr Q1H PRN IV hypotension;  Start 4/6/20 

at 13:51;  Stop 4/6/20 at 19:50;  Status DC


Albumin Human 200 ml @  200 mls/hr 1X PRN  PRN IV Hypotension Last administered 

on 4/6/20at 14:51;  Start 4/6/20 at 14:00;  Stop 4/6/20 at 19:59;  Status DC


Diphenhydramine HCl (Benadryl) 25 mg 1X PRN  PRN IV ITCHING;  Start 4/6/20 at 

14:00;  Stop 4/7/20 at 13:59;  Status DC


Diphenhydramine HCl (Benadryl) 25 mg 1X PRN  PRN IV ITCHING;  Start 4/6/20 at 1

4:00;  Stop 4/7/20 at 13:59;  Status DC


Sodium Chloride 1,000 ml @  400 mls/hr Q2H30M PRN IV PATENCY;  Start 4/6/20 at 

13:51;  Stop 4/7/20 at 01:50;  Status DC


Info (PHARMACY MONITORING -- do not chart) 1 each PRN DAILY  PRN MC SEE 

COMMENTS;  Start 4/6/20 at 14:00;  Stop 4/9/20 at 08:16;  Status DC


Heparin Sodium (Porcine) (Hep Lock Adult) 500 unit STK-MED ONCE IVP ;  Start 

4/7/20 at 09:29;  Stop 4/7/20 at 09:30;  Status DC


Sodium Chloride 1,000 ml @  1,000 mls/hr Q1H PRN IV hypotension;  Start 4/7/20 

at 10:43;  Stop 4/7/20 at 16:42;  Status DC


Sodium Chloride 1,000 ml @  400 mls/hr Q2H30M PRN IV PATENCY;  Start 4/7/20 at 

10:43;  Stop 4/7/20 at 22:42;  Status DC


Info (PHARMACY MONITORING -- do not chart) 1 each PRN DAILY  PRN MC SEE 

COMMENTS;  Start 4/7/20 at 10:45;  Status UNV


Info (PHARMACY MONITORING -- do not chart) 1 each PRN DAILY  PRN MC SEE 

COMMENTS;  Start 4/7/20 at 10:45;  Status UNV


Sodium Chloride 90 meq/Potassium Chloride 15 meq/ Magnesium Sulfate 12 

meq/Calcium Gluconate 15 meq/ Multivitamins 10 ml/Chromium/ Copper/Manganese/ 

Seleni/Zn 0.5 ml/ Insulin Human Regular 25 unit/ Total Parenteral 

Nutrition/Amino Acids/Dextrose/ Fat Emulsion Intravenous 1,400 ml @  58.333 mls/

hr TPN  CONT IV  Last administered on 4/7/20at 22:13;  Start 4/7/20 at 22:00;  

Stop 4/8/20 at 21:59;  Status DC


Sodium Chloride 1,000 ml @  1,000 mls/hr Q1H PRN IV hypotension;  Start 4/8/20 

at 07:50;  Stop 4/8/20 at 13:49;  Status DC


Albumin Human 200 ml @  200 mls/hr 1X  ONCE IV ;  Start 4/8/20 at 08:00;  Stop 

4/8/20 at 08:53;  Status DC


Diphenhydramine HCl (Benadryl) 25 mg 1X PRN  PRN IV ITCHING;  Start 4/8/20 at 

08:00;  Stop 4/9/20 at 07:59;  Status DC


Diphenhydramine HCl (Benadryl) 25 mg 1X PRN  PRN IV ITCHING;  Start 4/8/20 at 

08:00;  Stop 4/9/20 at 07:59;  Status DC


Info (PHARMACY MONITORING -- do not chart) 1 each PRN DAILY  PRN MC SEE 

COMMENTS;  Start 4/8/20 at 08:00;  Stop 4/9/20 at 08:16;  Status DC


Albumin Human 50 ml @ 50 mls/hr 1X  ONCE IV ;  Start 4/8/20 at 08:53;  Stop 

4/8/20 at 08:56;  Status DC


Albumin Human 200 ml @  50 mls/hr PRN 1X  PRN IV HYPOTENSION Last administered 

on 4/14/20at 11:54;  Start 4/8/20 at 09:00


Meropenem 500 mg/ Sodium Chloride 50 ml @  100 mls/hr Q12H IV  Last administered

on 4/18/20at 21:58;  Start 4/8/20 at 10:00


Sodium Chloride 90 meq/Magnesium Sulfate 12 meq/ Calcium Gluconate 15 meq/ 

Multivitamins 10 ml/Chromium/ Copper/Manganese/ Seleni/Zn 0.5 ml/ Insulin Human 

Regular 25 unit/ Total Parenteral Nutrition/Amino Acids/Dextrose/ Fat Emulsion 

Intravenous 1,400 ml @  58.333 mls/ hr TPN  CONT IV  Last administered on 

4/8/20at 21:41;  Start 4/8/20 at 22:00;  Stop 4/9/20 at 21:59;  Status DC


Sodium Chloride 1,000 ml @  1,000 mls/hr Q1H PRN IV hypotension;  Start 4/9/20 

at 07:58;  Stop 4/9/20 at 13:57;  Status DC


Albumin Human 200 ml @  200 mls/hr 1X PRN  PRN IV Hypotension Last administered 

on 4/9/20at 09:30;  Start 4/9/20 at 08:00;  Stop 4/9/20 at 13:59;  Status DC


Sodium Chloride 1,000 ml @  400 mls/hr Q2H30M PRN IV PATENCY;  Start 4/9/20 at 

07:58;  Stop 4/9/20 at 19:57;  Status DC


Info (PHARMACY MONITORING -- do not chart) 1 each PRN DAILY  PRN MC SEE 

COMMENTS;  Start 4/9/20 at 08:00;  Status Cancel


Info (PHARMACY MONITORING -- do not chart) 1 each PRN DAILY  PRN MC SEE 

COMMENTS;  Start 4/9/20 at 08:15;  Status UNV


Sodium Chloride 90 meq/Potassium Phosphate 5 mmol/ Magnesium Sulfate 12 

meq/Calcium Gluconate 15 meq/ Multivitamins 10 ml/Chromium/ Copper/Manganese/ 

Seleni/Zn 0.5 ml/ Insulin Human Regular 30 unit/ Total Parenteral 

Nutrition/Amino Acids/Dextrose/ Fat Emulsion Intravenous 1,400 ml @  58.333 mls/

hr TPN  CONT IV  Last administered on 4/9/20at 22:08;  Start 4/9/20 at 22:00;  

Stop 4/10/20 at 21:59;  Status DC


Linezolid/Dextrose 300 ml @  300 mls/hr Q12HR IV  Last administered on 4/19/20at

08:12;  Start 4/10/20 at 11:00


Sodium Chloride 90 meq/Potassium Phosphate 15 mmol/ Magnesium Sulfate 12 

meq/Calcium Gluconate 15 meq/ Multivitamins 10 ml/Chromium/ Copper/Manganese/ S

haley/Zn 0.5 ml/ Insulin Human Regular 30 unit/ Total Parenteral Nutrition/Amino

Acids/Dextrose/ Fat Emulsion Intravenous 1,400 ml @  58.333 mls/ hr TPN  CONT IV

 Last administered on 4/10/20at 21:49;  Start 4/10/20 at 22:00;  Stop 4/11/20 at

21:59;  Status DC


Sodium Chloride 90 meq/Potassium Phosphate 15 mmol/ Magnesium Sulfate 12 

meq/Calcium Gluconate 15 meq/ Multivitamins 10 ml/Chromium/ Copper/Manganese/ 

Seleni/Zn 0.5 ml/ Insulin Human Regular 40 unit/ Total Parenteral 

Nutrition/Amino Acids/Dextrose/ Fat Emulsion Intravenous 1,400 ml @  58.333 mls/

hr TPN  CONT IV  Last administered on 4/11/20at 21:21;  Start 4/11/20 at 22:00; 

Stop 4/12/20 at 21:59;  Status DC


Sodium Chloride 1,000 ml @  1,000 mls/hr Q1H PRN IV hypotension;  Start 4/11/20 

at 13:26;  Stop 4/11/20 at 19:25;  Status DC


Albumin Human 200 ml @  200 mls/hr 1X PRN  PRN IV Hypotension Last administered 

on 4/11/20at 15:00;  Start 4/11/20 at 13:30;  Stop 4/11/20 at 19:29;  Status DC


Sodium Chloride (Normal Saline Flush) 10 ml 1X PRN  PRN IV AP catheter pack;  

Start 4/11/20 at 13:30;  Stop 4/12/20 at 13:29;  Status DC


Sodium Chloride (Normal Saline Flush) 10 ml 1X PRN  PRN IV  catheter pack;  

Start 4/11/20 at 13:30;  Stop 4/12/20 at 13:29;  Status DC


Sodium Chloride 1,000 ml @  400 mls/hr Q2H30M PRN IV PATENCY;  Start 4/11/20 at 

13:26;  Stop 4/12/20 at 01:25;  Status DC


Info (PHARMACY MONITORING -- do not chart) 1 each PRN DAILY  PRN MC SEE 

COMMENTS;  Start 4/11/20 at 13:30;  Stop 4/11/20 at 13:33;  Status DC


Info (PHARMACY MONITORING -- do not chart) 1 each PRN DAILY  PRN MC SEE 

COMMENTS;  Start 4/11/20 at 13:30;  Stop 4/11/20 at 13:34;  Status DC


Sodium Chloride 90 meq/Potassium Phosphate 19 mmol/ Magnesium Sulfate 12 

meq/Calcium Gluconate 15 meq/ Multivitamins 10 ml/Chromium/ Copper/Manganese/ Se

aram/Zn 0.5 ml/ Insulin Human Regular 40 unit/ Total Parenteral Nutrition/Amino 

Acids/Dextrose/ Fat Emulsion Intravenous 1,400 ml @  58.333 mls/ hr TPN  CONT IV

 Last administered on 4/12/20at 21:54;  Start 4/12/20 at 22:00;  Stop 4/13/20 at

21:59;  Status DC


Sodium Chloride 1,000 ml @  1,000 mls/hr Q1H PRN IV hypotension;  Start 4/13/20 

at 09:35;  Stop 4/13/20 at 15:34;  Status DC


Albumin Human 200 ml @  200 mls/hr 1X PRN  PRN IV Hypotension;  Start 4/13/20 at

09:45;  Stop 4/13/20 at 15:44;  Status DC


Diphenhydramine HCl (Benadryl) 25 mg 1X PRN  PRN IV ITCHING;  Start 4/13/20 at 

09:45;  Stop 4/14/20 at 09:44;  Status DC


Diphenhydramine HCl (Benadryl) 25 mg 1X PRN  PRN IV ITCHING;  Start 4/13/20 at 

09:45;  Stop 4/14/20 at 09:44;  Status DC


Sodium Chloride 1,000 ml @  400 mls/hr Q2H30M PRN IV PATENCY;  Start 4/13/20 at 

09:35;  Stop 4/13/20 at 21:34;  Status DC


Info (PHARMACY MONITORING -- do not chart) 1 each PRN DAILY  PRN MC SEE 

COMMENTS;  Start 4/13/20 at 09:45;  Status Cancel


Sodium Chloride 100 meq/Potassium Phosphate 19 mmol/ Magnesium Sulfate 12 

meq/Calcium Gluconate 15 meq/ Multivitamins 10 ml/Chromium/ Copper/Manganese/ 

Seleni/Zn 0.5 ml/ Insulin Human Regular 40 unit/ Potassium Chloride 20 meq/ 

Total Parenteral Nutrition/Amino Acids/Dextrose/ Fat Emulsion Intravenous 1,400 

ml @  58.333 mls/ hr TPN  CONT IV  Last administered on 4/13/20at 22:02;  Start 

4/13/20 at 22:00;  Stop 4/14/20 at 21:59;  Status DC


Furosemide (Lasix) 40 mg 1X  ONCE IVP  Last administered on 4/13/20at 14:39;  

Start 4/13/20 at 14:30;  Stop 4/13/20 at 14:31;  Status DC


Metronidazole 100 ml @  100 mls/hr Q8HRS IV  Last administered on 4/19/20at 

06:16;  Start 4/14/20 at 10:00


Sodium Chloride 1,000 ml @  1,000 mls/hr Q1H PRN IV hypotension;  Start 4/14/20 

at 08:00;  Stop 4/14/20 at 13:59;  Status DC


Albumin Human 200 ml @  200 mls/hr 1X PRN  PRN IV Hypotension;  Start 4/14/20 at

08:00;  Stop 4/14/20 at 13:59;  Status DC


Sodium Chloride 1,000 ml @  400 mls/hr Q2H30M PRN IV PATENCY;  Start 4/14/20 at 

08:00;  Stop 4/14/20 at 19:59;  Status DC


Info (PHARMACY MONITORING -- do not chart) 1 each PRN DAILY  PRN MC SEE 

COMMENTS;  Start 4/14/20 at 11:30;  Status UNV


Info (PHARMACY MONITORING -- do not chart) 1 each PRN DAILY  PRN MC SEE 

COMMENTS;  Start 4/14/20 at 11:30;  Stop 4/16/20 at 12:13;  Status DC


Sodium Chloride 100 meq/Potassium Phosphate 19 mmol/ Magnesium Sulfate 12 

meq/Calcium Gluconate 15 meq/ Multivitamins 10 ml/Chromium/ Copper/Manganese/ 

Seleni/Zn 0.5 ml/ Insulin Human Regular 40 unit/ Potassium Chloride 20 meq/ 

Total Parenteral Nutrition/Amino Acids/Dextrose/ Fat Emulsion Intravenous 1,400 

ml @  58.333 mls/ hr TPN  CONT IV  Last administered on 4/14/20at 21:52;  Start 

4/14/20 at 22:00;  Stop 4/15/20 at 21:59;  Status DC


Sodium Chloride (Normal Saline Flush) 10 ml QSHIFT  PRN IV AFTER MEDS AND BLOOD 

DRAWS;  Start 4/14/20 at 15:00


Sodium Chloride (Normal Saline Flush) 10 ml PRN Q5MIN  PRN IV AFTER MEDS AND 

BLOOD DRAWS;  Start 4/14/20 at 15:00


Sodium Chloride (Normal Saline Flush) 20 ml PRN Q5MIN  PRN IV AFTER MEDS AND 

BLOOD DRAWS;  Start 4/14/20 at 15:00


Sodium Chloride 100 meq/Potassium Phosphate 19 mmol/ Magnesium Sulfate 12 

meq/Calcium Gluconate 15 meq/ Multivitamins 10 ml/Chromium/ Copper/Manganese/ 

Seleni/Zn 0.5 ml/ Insulin Human Regular 40 unit/ Potassium Chloride 20 meq/ 

Total Parenteral Nutrition/Amino Acids/Dextrose/ Fat Emulsion Intravenous 1,400 

ml @  58.333 mls/ hr TPN  CONT IV  Last administered on 4/15/20at 21:20;  Start 

4/15/20 at 22:00;  Stop 4/16/20 at 21:59;  Status DC


Lidocaine HCl (Buffered Lidocaine 1%) 3 ml STK-MED ONCE .ROUTE ;  Start 4/15/20 

at 13:16;  Stop 4/15/20 at 13:16;  Status DC


Lidocaine HCl (Buffered Lidocaine 1%) 6 ml 1X  ONCE INJ  Last administered on 

4/15/20at 13:45;  Start 4/15/20 at 13:30;  Stop 4/15/20 at 13:31;  Status DC


Albumin Human 100 ml @  100 mls/hr 1X  ONCE IV  Last administered on 4/15/20at 

15:41;  Start 4/15/20 at 15:00;  Stop 4/15/20 at 15:59;  Status DC


Albumin Human 50 ml @ 50 mls/hr 1X  ONCE IV  Last administered on 4/15/20at 

15:00;  Start 4/15/20 at 15:00;  Stop 4/15/20 at 15:59;  Status DC


Info (PHARMACY MONITORING -- do not chart) 1 each PRN DAILY  PRN MC SEE 

COMMENTS;  Start 4/16/20 at 11:30;  Status Cancel


Info (PHARMACY MONITORING -- do not chart) 1 each PRN DAILY  PRN MC SEE 

COMMENTS;  Start 4/16/20 at 11:30;  Status UNV


Sodium Chloride 100 meq/Potassium Phosphate 10 mmol/ Magnesium Sulfate 12 

meq/Calcium Gluconate 15 meq/ Multivitamins 10 ml/Chromium/ Copper/Manganese/ 

Seleni/Zn 0.5 ml/ Insulin Human Regular 35 unit/ Potassium Chloride 20 meq/ 

Total Parenteral Nutrition/Amino Acids/Dextrose/ Fat Emulsion Intravenous 1,400 

ml @  58.333 mls/ hr TPN  CONT IV  Last administered on 4/16/20at 22:10;  Start 

4/16/20 at 22:00;  Stop 4/17/20 at 21:59;  Status DC


Sodium Chloride 100 meq/Potassium Phosphate 5 mmol/ Magnesium Sulfate 12 

meq/Calcium Gluconate 15 meq/ Multivitamins 10 ml/Chromium/ Copper/Manganese/ 

Seleni/Zn 0.5 ml/ Insulin Human Regular 35 unit/ Potassium Chloride 20 meq/ 

Total Parenteral Nutrition/Amino Acids/Dextrose/ Fat Emulsion Intravenous 1,400 

ml @  58.333 mls/ hr TPN  CONT IV  Last administered on 4/17/20at 22:59;  Start 

4/17/20 at 22:00;  Stop 4/18/20 at 21:59;  Status DC


Sodium Chloride 1,000 ml @  1,000 mls/hr Q1H PRN IV hypotension;  Start 4/18/20 

at 08:27;  Stop 4/18/20 at 14:26;  Status DC


Albumin Human 200 ml @  200 mls/hr 1X PRN  PRN IV Hypotension Last administered 

on 4/18/20at 09:18;  Start 4/18/20 at 08:30;  Stop 4/18/20 at 14:29;  Status DC


Sodium Chloride 1,000 ml @  400 mls/hr Q2H30M PRN IV PATENCY;  Start 4/18/20 at 

08:27;  Stop 4/18/20 at 20:26;  Status DC


Info (PHARMACY MONITORING -- do not chart) 1 each PRN DAILY  PRN MC SEE 

COMMENTS;  Start 4/18/20 at 08:30;  Status Cancel


Info (PHARMACY MONITORING -- do not chart) 1 each PRN DAILY  PRN MC SEE 

COMMENTS;  Start 4/18/20 at 08:30


Sodium Chloride 100 meq/Potassium Chloride 40 meq/ Magnesium Sulfate 15 

meq/Calcium Gluconate 15 meq/ Multivitamins 10 ml/Chromium/ Copper/Manganese/ 

Seleni/Zn 0.5 ml/ Insulin Human Regular 35 unit/ Total Parenteral 

Nutrition/Amino Acids/Dextrose/ Fat Emulsion Intravenous 1,400 ml @  58.333 mls/

hr TPN  CONT IV  Last administered on 4/18/20at 22:00;  Start 4/18/20 at 22:00; 

Stop 4/19/20 at 21:59


Potassium Chloride/Water 100 ml @  100 mls/hr 1X  ONCE IV  Last administered on 

4/18/20at 17:28;  Start 4/18/20 at 14:45;  Stop 4/18/20 at 15:44;  Status DC





Active Scripts


Active


Reported


Bisoprolol Fumarate 5 Mg Tablet 10 Mg PO DAILY


Vitals/I & O





Vital Sign - Last 24 Hours








 4/18/20 4/18/20 4/18/20 4/18/20





 09:00 10:00 11:00 11:18


 


Pulse 154 166 146 


 


Resp 26 30 24 


 


B/P (MAP) 171/80 (110) 185/98 (127) 167/80 (109) 


 


Pulse Ox 100 100 100 99


 


O2 Delivery Ventilator Ventilator Ventilator [facility]





 4/18/20 4/18/20 4/18/20 4/18/20





 12:00 12:00 13:00 13:51


 


Temp 99.4   





 99.4   


 


Pulse 97  86 


 


Resp 20  26 


 


B/P (MAP) 117/78 (91)  138/62 (87) 


 


Pulse Ox 100  100 98


 


O2 Delivery Ventilator Mechanical Ventilator Ventilator [facility]


 


    





    





 4/18/20 4/18/20 4/18/20 4/18/20





 14:00 15:00 15:55 16:00


 


Pulse 102 102  


 


Resp 30 28  


 


B/P (MAP) 122/68 (86) 98/59 (72)  


 


Pulse Ox 100 100 100 


 


O2 Delivery Ventilator Ventilator Ventilator Mechanical Ventilator





 4/18/20 4/18/20 4/18/20 4/18/20





 16:00 17:00 18:00 19:00


 


Temp 99.6   





 99.6   


 


Pulse 84 91 93 92


 


Resp 28 26 20 23


 


B/P (MAP) 110/63 (79) 134/75 (94) 106/56 (73) 98/56 (70)


 


Pulse Ox 100 100 100 100


 


O2 Delivery Ventilator Ventilator Ventilator Ventilator


 


    





    





 4/18/20 4/18/20 4/18/20 4/18/20





 19:35 20:00 20:00 21:00


 


Temp   100.5 





   100.5 


 


Pulse   88 88


 


Resp   20 20


 


B/P (MAP)   110/53 (72) 136/66 (89)


 


Pulse Ox 100  100 100


 


O2 Delivery Ventilator Mechanical Ventilator Ventilator Ventilator


 


    





    





 4/18/20 4/18/20 4/18/20 4/19/20





 22:00 23:00 23:34 00:00


 


Temp    98.5





    98.5


 


Pulse 104 96  96


 


Resp 36 33  35


 


B/P (MAP) 137/82 (100) 139/74 (95)  135/82 (99)


 


Pulse Ox 100 99 100 99


 


O2 Delivery Ventilator Ventilator Ventilator Ventilator


 


    





    





 4/19/20 4/19/20 4/19/20 4/19/20





 00:00 01:00 02:00 02:59


 


Pulse  90 86 


 


Resp  30 24 


 


B/P (MAP)  133/74 (93) 122/74 (90) 


 


Pulse Ox  100 100 100


 


O2 Delivery Mechanical Ventilator Ventilator Ventilator Ventilator





 4/19/20 4/19/20 4/19/20 4/19/20





 03:00 04:00 04:00 05:00


 


Temp  98.9  





  98.9  


 


Pulse 88 90  98


 


Resp 20 19  23


 


B/P (MAP) 153/82 (105) 119/68 (85)  141/78 (99)


 


Pulse Ox 100 100  100


 


O2 Delivery Ventilator Ventilator Mechanical Ventilator Ventilator


 


    





    





 4/19/20 4/19/20 4/19/20 4/19/20





 05:27 06:00 07:00 07:30


 


Pulse  92 95 


 


Resp  18 18 


 


B/P (MAP)  129/78 (95) 126/69 (88) 


 


Pulse Ox 100 100 100 100


 


O2 Delivery Ventilator Ventilator Ventilator Ventilator





 4/19/20 4/19/20 4/19/20 





 07:37 07:43 08:00 


 


O2 Delivery Ventilator Ventilator Mechanical Ventilator 














Intake and Output   


 


 4/18/20 4/18/20 4/19/20





 15:00 23:00 07:00


 


Intake Total  3319.84 ml 438 ml


 


Output Total 340 ml 255 ml 565 ml


 


Balance -340 ml 3064.84 ml -127 ml











Hemodynamically unstable?:  No


Is patient in severe pain?:  No


Is NPO status required?:  Yes











GAETANO GONCALVES MD        Apr 19, 2020 08:52

## 2020-04-20 VITALS — DIASTOLIC BLOOD PRESSURE: 83 MMHG | SYSTOLIC BLOOD PRESSURE: 141 MMHG

## 2020-04-20 VITALS — DIASTOLIC BLOOD PRESSURE: 70 MMHG | SYSTOLIC BLOOD PRESSURE: 141 MMHG

## 2020-04-20 VITALS — DIASTOLIC BLOOD PRESSURE: 82 MMHG | SYSTOLIC BLOOD PRESSURE: 148 MMHG

## 2020-04-20 VITALS — DIASTOLIC BLOOD PRESSURE: 81 MMHG | SYSTOLIC BLOOD PRESSURE: 154 MMHG

## 2020-04-20 VITALS — DIASTOLIC BLOOD PRESSURE: 74 MMHG | SYSTOLIC BLOOD PRESSURE: 153 MMHG

## 2020-04-20 VITALS — SYSTOLIC BLOOD PRESSURE: 128 MMHG | DIASTOLIC BLOOD PRESSURE: 82 MMHG

## 2020-04-20 VITALS — SYSTOLIC BLOOD PRESSURE: 112 MMHG | DIASTOLIC BLOOD PRESSURE: 67 MMHG

## 2020-04-20 VITALS — DIASTOLIC BLOOD PRESSURE: 71 MMHG | SYSTOLIC BLOOD PRESSURE: 137 MMHG

## 2020-04-20 VITALS — SYSTOLIC BLOOD PRESSURE: 117 MMHG | DIASTOLIC BLOOD PRESSURE: 58 MMHG

## 2020-04-20 VITALS — SYSTOLIC BLOOD PRESSURE: 152 MMHG | DIASTOLIC BLOOD PRESSURE: 86 MMHG

## 2020-04-20 VITALS — DIASTOLIC BLOOD PRESSURE: 86 MMHG | SYSTOLIC BLOOD PRESSURE: 149 MMHG

## 2020-04-20 VITALS — DIASTOLIC BLOOD PRESSURE: 82 MMHG | SYSTOLIC BLOOD PRESSURE: 147 MMHG

## 2020-04-20 VITALS — SYSTOLIC BLOOD PRESSURE: 166 MMHG | DIASTOLIC BLOOD PRESSURE: 81 MMHG

## 2020-04-20 VITALS — SYSTOLIC BLOOD PRESSURE: 132 MMHG | DIASTOLIC BLOOD PRESSURE: 78 MMHG

## 2020-04-20 VITALS — SYSTOLIC BLOOD PRESSURE: 156 MMHG | DIASTOLIC BLOOD PRESSURE: 84 MMHG

## 2020-04-20 VITALS — SYSTOLIC BLOOD PRESSURE: 157 MMHG | DIASTOLIC BLOOD PRESSURE: 79 MMHG

## 2020-04-20 VITALS — DIASTOLIC BLOOD PRESSURE: 84 MMHG | SYSTOLIC BLOOD PRESSURE: 140 MMHG

## 2020-04-20 VITALS — DIASTOLIC BLOOD PRESSURE: 82 MMHG | SYSTOLIC BLOOD PRESSURE: 142 MMHG

## 2020-04-20 VITALS — SYSTOLIC BLOOD PRESSURE: 141 MMHG | DIASTOLIC BLOOD PRESSURE: 83 MMHG

## 2020-04-20 VITALS — SYSTOLIC BLOOD PRESSURE: 127 MMHG | DIASTOLIC BLOOD PRESSURE: 60 MMHG

## 2020-04-20 VITALS — SYSTOLIC BLOOD PRESSURE: 156 MMHG | DIASTOLIC BLOOD PRESSURE: 71 MMHG

## 2020-04-20 VITALS — SYSTOLIC BLOOD PRESSURE: 144 MMHG | DIASTOLIC BLOOD PRESSURE: 81 MMHG

## 2020-04-20 VITALS — DIASTOLIC BLOOD PRESSURE: 61 MMHG | SYSTOLIC BLOOD PRESSURE: 111 MMHG

## 2020-04-20 VITALS — DIASTOLIC BLOOD PRESSURE: 76 MMHG | SYSTOLIC BLOOD PRESSURE: 117 MMHG

## 2020-04-20 LAB
ANION GAP SERPL CALC-SCNC: 12 MMOL/L (ref 6–14)
BASOPHILS # BLD AUTO: 0 X10^3/UL (ref 0–0.2)
BASOPHILS NFR BLD: 0 % (ref 0–3)
BUN SERPL-MCNC: 76 MG/DL (ref 7–20)
CALCIUM SERPL-MCNC: 8.8 MG/DL (ref 8.5–10.1)
CHLORIDE SERPL-SCNC: 104 MMOL/L (ref 98–107)
CO2 SERPL-SCNC: 25 MMOL/L (ref 21–32)
CREAT SERPL-MCNC: 1.6 MG/DL (ref 0.6–1)
EOSINOPHIL NFR BLD: 0 % (ref 0–3)
EOSINOPHIL NFR BLD: 0 X10^3/UL (ref 0–0.7)
ERYTHROCYTE [DISTWIDTH] IN BLOOD BY AUTOMATED COUNT: 18.1 % (ref 11.5–14.5)
GFR SERPLBLD BASED ON 1.73 SQ M-ARVRAT: 34.3 ML/MIN
GLUCOSE SERPL-MCNC: 133 MG/DL (ref 70–99)
HCT VFR BLD CALC: 23.3 % (ref 36–47)
HGB BLD-MCNC: 7.7 G/DL (ref 12–15.5)
LYMPHOCYTES # BLD: 1.6 X10^3/UL (ref 1–4.8)
LYMPHOCYTES NFR BLD AUTO: 15 % (ref 24–48)
MAGNESIUM SERPL-MCNC: 1.7 MG/DL (ref 1.8–2.4)
MCH RBC QN AUTO: 31 PG (ref 25–35)
MCHC RBC AUTO-ENTMCNC: 33 G/DL (ref 31–37)
MCV RBC AUTO: 93 FL (ref 79–100)
MONO #: 1 X10^3/UL (ref 0–1.1)
MONOCYTES NFR BLD: 10 % (ref 0–9)
NEUT #: 7.8 X10^3/UL (ref 1.8–7.7)
NEUTROPHILS NFR BLD AUTO: 75 % (ref 31–73)
PHOSPHATE SERPL-MCNC: 4 MG/DL (ref 2.6–4.7)
PLATELET # BLD AUTO: 545 X10^3/UL (ref 140–400)
POTASSIUM SERPL-SCNC: 3.8 MMOL/L (ref 3.5–5.1)
RBC # BLD AUTO: 2.5 X10^6/UL (ref 3.5–5.4)
SODIUM SERPL-SCNC: 141 MMOL/L (ref 136–145)
TRIGL SERPL-MCNC: 144 MG/DL (ref 0–150)
WBC # BLD AUTO: 10.4 X10^3/UL (ref 4–11)

## 2020-04-20 PROCEDURE — 5A09457 ASSISTANCE WITH RESPIRATORY VENTILATION, 24-96 CONSECUTIVE HOURS, CONTINUOUS POSITIVE AIRWAY PRESSURE: ICD-10-PCS | Performed by: SURGERY

## 2020-04-20 RX ADMIN — INSULIN LISPRO SCH UNITS: 100 INJECTION, SOLUTION INTRAVENOUS; SUBCUTANEOUS at 00:00

## 2020-04-20 RX ADMIN — PANTOPRAZOLE SODIUM SCH MG: 40 INJECTION, POWDER, FOR SOLUTION INTRAVENOUS at 09:16

## 2020-04-20 RX ADMIN — DEXMEDETOMIDINE HYDROCHLORIDE PRN MLS/HR: 100 INJECTION, SOLUTION, CONCENTRATE INTRAVENOUS at 02:08

## 2020-04-20 RX ADMIN — MEROPENEM SCH MLS/HR: 500 INJECTION, POWDER, FOR SOLUTION INTRAVENOUS at 22:17

## 2020-04-20 RX ADMIN — Medication PRN EACH: at 14:21

## 2020-04-20 RX ADMIN — ACETAMINOPHEN PRN MG: 650 SUPPOSITORY RECTAL at 20:33

## 2020-04-20 RX ADMIN — INSULIN LISPRO SCH UNITS: 100 INJECTION, SOLUTION INTRAVENOUS; SUBCUTANEOUS at 18:00

## 2020-04-20 RX ADMIN — ACETAMINOPHEN PRN MG: 650 SUPPOSITORY RECTAL at 12:33

## 2020-04-20 RX ADMIN — DEXMEDETOMIDINE HYDROCHLORIDE PRN MLS/HR: 100 INJECTION, SOLUTION, CONCENTRATE INTRAVENOUS at 17:26

## 2020-04-20 RX ADMIN — HEPARIN SODIUM SCH UNIT: 5000 INJECTION, SOLUTION INTRAVENOUS; SUBCUTANEOUS at 21:30

## 2020-04-20 RX ADMIN — MEROPENEM SCH MLS/HR: 500 INJECTION, POWDER, FOR SOLUTION INTRAVENOUS at 09:17

## 2020-04-20 RX ADMIN — FENTANYL CITRATE PRN MCG: 50 INJECTION INTRAMUSCULAR; INTRAVENOUS at 21:27

## 2020-04-20 RX ADMIN — INSULIN LISPRO SCH UNITS: 100 INJECTION, SOLUTION INTRAVENOUS; SUBCUTANEOUS at 06:00

## 2020-04-20 RX ADMIN — DEXMEDETOMIDINE HYDROCHLORIDE PRN MLS/HR: 100 INJECTION, SOLUTION, CONCENTRATE INTRAVENOUS at 07:33

## 2020-04-20 RX ADMIN — MAGNESIUM SULFATE IN WATER PRN MLS/HR: 40 INJECTION, SOLUTION INTRAVENOUS at 17:27

## 2020-04-20 RX ADMIN — DEXMEDETOMIDINE HYDROCHLORIDE PRN MLS/HR: 100 INJECTION, SOLUTION, CONCENTRATE INTRAVENOUS at 13:32

## 2020-04-20 RX ADMIN — INSULIN LISPRO SCH UNITS: 100 INJECTION, SOLUTION INTRAVENOUS; SUBCUTANEOUS at 12:32

## 2020-04-20 RX ADMIN — DEXMEDETOMIDINE HYDROCHLORIDE PRN MLS/HR: 100 INJECTION, SOLUTION, CONCENTRATE INTRAVENOUS at 10:36

## 2020-04-20 RX ADMIN — DEXTROSE SCH MLS/HR: 50 INJECTION, SOLUTION INTRAVENOUS at 09:16

## 2020-04-20 RX ADMIN — HEPARIN SODIUM SCH UNIT: 5000 INJECTION, SOLUTION INTRAVENOUS; SUBCUTANEOUS at 09:21

## 2020-04-20 RX ADMIN — ONDANSETRON PRN MG: 2 INJECTION INTRAMUSCULAR; INTRAVENOUS at 17:34

## 2020-04-20 RX ADMIN — ONDANSETRON PRN MG: 2 INJECTION INTRAMUSCULAR; INTRAVENOUS at 03:11

## 2020-04-20 RX ADMIN — DEXMEDETOMIDINE HYDROCHLORIDE PRN MLS/HR: 100 INJECTION, SOLUTION, CONCENTRATE INTRAVENOUS at 20:46

## 2020-04-20 RX ADMIN — DEXMEDETOMIDINE HYDROCHLORIDE PRN MLS/HR: 100 INJECTION, SOLUTION, CONCENTRATE INTRAVENOUS at 23:42

## 2020-04-20 RX ADMIN — DEXMEDETOMIDINE HYDROCHLORIDE PRN MLS/HR: 100 INJECTION, SOLUTION, CONCENTRATE INTRAVENOUS at 04:08

## 2020-04-20 NOTE — PDOC
SUBJECTIVE


ROS


stable





OBJECTIVE


Vital Signs





Vital Signs








  Date Time  Temp Pulse Resp B/P (MAP) Pulse Ox O2 Delivery O2 Flow Rate FiO2


 


4/20/20 13:00  116 22 141/70 (93) 100 Ventilator  


 


4/20/20 12:00 101.2       





 101.2       








I & 0











Intake and Output 


 


 4/20/20





 07:00


 


Intake Total 3962.19 ml


 


Output Total 1985 ml


 


Balance 1977.19 ml


 


 


 


Intake Oral 0 ml


 


IV Total 3722.19 ml


 


Other 240 ml


 


Output Urine Total 1935 ml


 


Gastric Drainage Total 50 ml











PHYSICAL EXAM


Physical Exam


GENERAL: arouses to voice


HEENT: + NGT, 


NECK:  Trach/vent 


LUNGS: rhonchi 


HEART:  S1, S2, regular 


ABDOMEN: Distended, tender, hypoactive BS, 


: [place] (4/14)


EXTREMITIES: Generalized edema, no cyanosis, 


DERMATOLOGIC:   No generalized rash.  


CENTRAL NERVOUS SYSTEM: Nods appropriately to few questions, 


HDC  (4/14)





DIAGNOSIS/ASSESSMENT


Assessment & Plan


ARF-- ATN,


On HD currently ,  TTS  schedule now  , improved uop  


Monitor for renal recovery  


 


Anemia-  Currently on YOLI 





Anasarca due to 3rd spacing  stable  





Hyperkalemia- resolved  





AC Pancreatitis, early developing necrosis


No intervention per GS  


CT SCAN 4/9 -1. Increased ascites.Persistent evidence of necrotizing 

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





  Cholelithiasis with thickening of the gallbladder wall.





Acute hypoxic resp failure ,bilateral pleural effusion


Has Trach, On Vent  


 


 Persistent pleural effusions and atelectasis in the lung bases.





hypocalcemia - stable 





HTN- Hypotensive , pressors as indicated





COVID-19 neg, 3/26





COMMENT/RELEVANT DATA


Meds





Current Medications








 Medications


  (Trade)  Dose


 Ordered  Sig/Yee  Start Time


 Stop Time Status Last Admin


Dose Admin


 


 Acetaminophen


  (Tylenol Supp)  650 mg  PRN Q6HRS  PRN  3/24/20 10:30


    4/20/20 12:33


650 MG


 


 Acetaminophen


  (Tylenol)  650 mg  PRN Q6HRS  PRN  3/21/20 03:36


    4/16/20 19:56


650 MG


 


 Albumin Human  200 ml @ 


 200 mls/hr  1X PRN  PRN  4/18/20 08:30


 4/18/20 14:29 DC 4/18/20 09:18


200 MLS/HR


 


 Albuterol Sulfate


  (Ventolin Neb


 Soln)  2.5 mg  1X  ONCE  3/17/20 22:30


 3/17/20 22:31 DC 3/18/20 00:56


2.5 MG


 


 Alteplase,


 Recombinant


  (Cathflo For


 Central Catheter


 Clearance)  1 mg  1X  ONCE  3/31/20 22:00


 3/31/20 22:01 DC 3/31/20 22:05


1 MG


 


 Artificial Tears


  (Artificial


 Tears)  1 drop  PRN Q1HR  PRN  3/23/20 08:15


     





 


 Atenolol


  (Tenormin)  100 mg  DAILY  3/17/20 09:00


 3/16/20 20:08 DC  





 


 Atropine Sulfate


  (ATROPINE 0.5mg


 SYRINGE)  0.5 mg  PRN Q5MIN  PRN  4/2/20 08:15


     





 


 Benzocaine


  (Hurricaine One)  1 spray  1X  ONCE  3/20/20 14:30


 3/20/20 14:31 DC 3/20/20 16:38


1 SPRAY


 


 Bupivacaine HCl/


 Epinephrine Bitart


  (Sensorcain-Epi


 0.5%-1:671786 Mpf)  30 ml  STK-MED ONCE  4/6/20 11:00


 4/6/20 11:01 DC 4/6/20 11:44


1 ML


 


 Calcium Carbonate/


 Glycine


  (Tums)  500 mg  PRN AFTMEALHC  PRN  3/18/20 17:45


     





 


 Calcium Chloride


 1000 mg/Sodium


 Chloride  110 ml @ 


 220 mls/hr  1X  ONCE  3/17/20 22:30


 3/17/20 22:59 DC 3/17/20 22:11


220 MLS/HR


 


 Calcium Chloride


 3000 mg/Sodium


 Chloride  1,030 ml @ 


 50 mls/hr  [id number]  3/19/20 08:00


 3/21/20 15:23 DC 3/21/20 02:17


50 MLS/HR


 


 Calcium Gluconate


  (Calcium


 Gluconate)  2,000 mg  1X  ONCE  3/19/20 02:15


 3/19/20 02:16 DC 3/19/20 02:19


2,000 MG


 


 Calcium Gluconate


 1000 mg/Sodium


 Chloride  110 ml @ 


 220 mls/hr  1X  ONCE  3/18/20 03:30


 3/18/20 03:59 DC 3/18/20 03:21


220 MLS/HR


 


 Calcium Gluconate


 2000 mg/Sodium


 Chloride  120 ml @ 


 220 mls/hr  1X  ONCE  3/18/20 07:30


 3/18/20 08:02 DC 3/18/20 09:05


220 MLS/HR


 


 Cefepime HCl


  (Maxipime)  2 gm  Q12HR  3/25/20 09:00


 4/8/20 09:58 DC 4/7/20 20:56


2 GM


 


 Cellulose


  (Surgicel


 Fibrillar 1x2)  1 each  STK-MED ONCE  4/6/20 11:00


 4/6/20 11:01 DC  





 


 Cellulose


  (Surgicel


 Hemostat 4x8)  1 each  STK-MED ONCE  4/6/20 11:55


 4/6/20 11:56 DC 4/6/20 11:44


1 EACH


 


 Daptomycin 500 mg/


 Sodium Chloride  50 ml @ 


 100 mls/hr  Q48H  3/25/20 08:30


 4/10/20 10:07 DC 4/10/20 09:57


100 MLS/HR


 


 Dexmedetomidine


 HCl 400 mcg/


 Sodium Chloride  100 ml @ 0


 mls/hr  CONT  PRN  4/2/20 08:15


    4/20/20 10:36


30.5 MLS/HR


 


 Dextrose


  (Dextrose


 50%-Water Syringe)  12.5 gm  PRN Q15MIN  PRN  3/16/20 09:30


     





 


 Digoxin


  (Lanoxin)  125 mcg  1X  ONCE  3/19/20 18:00


 3/19/20 18:01 DC 3/19/20 17:10


125 MCG


 


 Diphenhydramine


 HCl


  (Benadryl)  25 mg  1X PRN  PRN  4/13/20 09:45


 4/14/20 09:44 DC  





 


 Etomidate


  (Amidate)  8 mg  1X  ONCE  3/23/20 08:30


 3/23/20 08:31 DC 3/23/20 08:33


8 MG


 


 Fentanyl Citrate


  (Fentanyl 2ml


 Vial)  50 mcg  PRN Q5MIN  PRN  4/6/20 07:00


 4/7/20 06:59 DC  





 


 Furosemide


  (Lasix)  40 mg  1X  ONCE  4/13/20 14:30


 4/13/20 14:31 DC 4/13/20 14:39


40 MG


 


 Heparin Sodium


  (Porcine)


  (Hep Lock Adult)  500 unit  STK-MED ONCE  4/7/20 09:29


 4/7/20 09:30 DC  





 


 Heparin Sodium


  (Porcine)


  (Heparin Sodium)  5,000 unit  Q12HR  4/3/20 21:00


    4/20/20 09:21


5,000 UNIT


 


 Hydromorphone HCl


  (Dilaudid)  1 mg  PRN Q3HRS  PRN  3/17/20 12:00


 3/31/20 00:25 DC 3/23/20 05:13


1 MG


 


 Info


  (CONTRAST GIVEN


 -- Rx MONITORING)  1 each  PRN DAILY  PRN  3/30/20 11:45


 4/1/20 11:44 DC  





 


 Info


  (Icu Electrolyte


 Protocol)  1 ea  CONT PRN  PRN  3/29/20 13:15


     





 


 Info


  (PHARMACY


 MONITORING -- do


 not chart)  1 each  PRN DAILY  PRN  4/18/20 08:30


     





 


 Info


  (Tpn Per


 Pharmacy)  1 each  PRN DAILY  PRN  3/18/20 12:30


   UNV  





 


 Insulin Human


 Lispro


  (HumaLOG)  0-9 UNITS  Q6HRS  3/16/20 09:30


    4/20/20 12:32


4 UNITS


 


 Insulin Human


 Regular


  (HumuLIN R VIAL)  5 unit  1X  ONCE  3/17/20 22:30


 3/17/20 22:31 DC 3/17/20 22:14


5 UNIT


 


 Iohexol


  (Omnipaque 240


 Mg/ml)  30 ml  1X  ONCE  3/30/20 11:30


 3/30/20 11:33 DC 3/30/20 11:30


30 ML


 


 Iohexol


  (Omnipaque 300


 Mg/ml)  60 ml  1X  ONCE  3/17/20 17:00


 3/17/20 17:01 DC 3/17/20 17:20


60 ML


 


 Iohexol


  (Omnipaque 350


 Mg/ml)  90 ml  1X  ONCE  3/16/20 03:30


 3/16/20 03:31 DC 3/16/20 03:25


90 ML


 


 Ketorolac


 Tromethamine


  (Toradol 30mg


 Vial)  30 mg  1X  ONCE  3/16/20 03:00


 3/16/20 03:01 DC 3/16/20 02:54


30 MG


 


 Lidocaine HCl


  (Buffered


 Lidocaine 1%)  6 ml  1X  ONCE  4/15/20 13:30


 4/15/20 13:31 DC 4/15/20 13:45


9 ML


 


 Lidocaine HCl


  (Glydo


  (Lidocaine) Jelly)  1 thomas  1X  ONCE  3/20/20 14:30


 3/20/20 14:31 DC 3/20/20 16:38


1 THOMAS


 


 Lidocaine HCl


  (Xylocaine-Mpf


 1% 2ml Vial)  2 ml  PRN 1X  PRN  4/6/20 07:00


 4/7/20 06:59 DC  





 


 Linezolid/Dextrose  300 ml @ 


 300 mls/hr  Q12HR  4/10/20 11:00


    4/20/20 09:16


300 MLS/HR


 


 Lorazepam


  (Ativan Inj)  1 mg  PRN Q4HRS  PRN  3/19/20 09:00


 4/17/20 09:19 DC 4/17/20 03:51


1 MG


 


 Magnesium Sulfate  50 ml @ 25


 mls/hr  PRN DAILY  PRN  4/5/20 09:15


     





 


 Meropenem 1 gm/


 Sodium Chloride  100 ml @ 


 200 mls/hr  Q8HRS  3/17/20 20:00


 3/18/20 08:48 DC 3/18/20 05:45


200 MLS/HR


 


 Meropenem 500 mg/


 Sodium Chloride  50 ml @ 


 100 mls/hr  Q12H  4/8/20 10:00


    4/20/20 09:17


100 MLS/HR


 


 Metoprolol


 Tartrate


  (Lopressor Vial)  5 mg  Q6HRS  3/17/20 10:15


 3/28/20 08:48 DC 3/26/20 00:12


5 MG


 


 Metronidazole  100 ml @ 


 100 mls/hr  Q8HRS  4/14/20 10:00


    4/20/20 06:41


100 MLS/HR


 


 Micafungin Sodium


 100 mg/Dextrose  100 ml @ 


 100 mls/hr  Q24H  3/23/20 09:00


    4/20/20 09:16


100 MLS/HR


 


 Midazolam HCl


  (Versed)  5 mg  1X  ONCE  3/23/20 08:30


 3/23/20 08:31 DC  





 


 Midazolam HCl 100


 mg/Sodium Chloride  100 ml @ 7


 mls/hr  CONT  PRN  3/28/20 16:00


    4/8/20 15:35


7 MLS/HR


 


 Midazolam HCl 50


 mg/Sodium Chloride  50 ml @ 0


 mls/hr  CONT  PRN  3/23/20 08:15


 3/28/20 15:59 DC 3/26/20 22:39


7 MLS/HR


 


 Morphine Sulfate


  (Morphine


 Sulfate)  2 mg  PRN Q2HR  PRN  3/16/20 05:00


 3/17/20 14:15 DC 3/17/20 12:26


2 MG


 


 Multi-Ingred


 Cream/Lotion/Oil/


 Oint


  (Artificial


 Tears Eye


 Ointment)  1 thomas  PRN Q1HR  PRN  3/25/20 17:30


    4/13/20 08:19


1 THOMAS


 


 Norepinephrine


 Bitartrate 8 mg/


 Dextrose  258 ml @ 


 17.299 mls/


 hr  CONT  PRN  3/17/20 15:30


 4/17/20 09:19 DC 4/14/20 12:48


20.9 MLS/HR


 


 Ondansetron HCl


  (Zofran)  4 mg  PRN Q6HRS  PRN  4/6/20 07:00


 4/7/20 06:59 DC  





 


 Pantoprazole


 Sodium


  (PROTONIX VIAL


 for IV PUSH)  40 mg  DAILYAC  3/16/20 11:30


    4/20/20 09:16


40 MG


 


 Piperacillin Sod/


 Tazobactam Sod


 4.5 gm/Sodium


 Chloride  100 ml @ 


 200 mls/hr  1X  ONCE  3/16/20 06:00


 3/16/20 06:29 DC 3/16/20 05:44


200 MLS/HR


 


 Potassium


 Chloride 15 meq/


 Bicarbonate


 Dialysis Soln w/


 out KCl  5,007.5 ml


  @ 1,000 mls/


 hr  Q5H1M  3/29/20 20:00


 4/2/20 13:08 DC 4/1/20 18:14


1,000 MLS/HR


 


 Potassium


 Chloride 20 meq/


 Bicarbonate


 Dialysis Soln w/


 out KCl  5,010 ml @ 


 1,000 mls/hr  Q5H1M  3/25/20 16:00


 3/29/20 19:59 DC 3/29/20 14:54


1,000 MLS/HR


 


 Potassium


 Chloride/Water  100 ml @ 


 100 mls/hr  1X  ONCE  4/18/20 14:45


 4/18/20 15:44 DC 4/18/20 17:28


100 MLS/HR


 


 Potassium


 Phosphate 20 mmol/


 Sodium Chloride  106.6667


 ml @ 


 51.667 m...  1X  ONCE  3/25/20 13:00


 3/25/20 15:03 DC 3/25/20 12:51


51.667 MLS/HR


 


 Prochlorperazine


 Edisylate


  (Compazine)  5 mg  PACU PRN  PRN  4/6/20 07:00


 4/7/20 06:59 DC  





 


 Propofol  20 ml @ As


 Directed  STK-MED ONCE  4/6/20 11:07


 4/6/20 11:07 DC  





 


 Ringer's Solution  1,000 ml @ 


 30 mls/hr  Q24H  4/6/20 07:00


 4/6/20 18:59 DC  





 


 Sevoflurane


  (Ultane)  60 ml  STK-MED ONCE  4/6/20 12:46


 4/6/20 12:46 DC  





 


 Sodium


 Bicarbonate 50


 meq/Sodium


 Chloride  1,050 ml @ 


 75 mls/hr  Q14H  3/18/20 07:30


 3/23/20 10:28 DC 3/22/20 21:10


75 MLS/HR


 


 Sodium Chloride


  (Normal Saline


 Flush)  20 ml  PRN Q5MIN  PRN  4/14/20 15:00


     





 


 Sodium Chloride


 90 meq/Calcium


 Gluconate 10 meq/


 Multivitamins 10


 ml/Chromium/


 Copper/Manganese/


 Seleni/Zn 0.5 ml/


 Total Parenteral


 Nutrition/Amino


 Acids/Dextrose/


 Fat Emulsion


 Intravenous  1,512 ml @ 


 63 mls/hr  TPN  CONT  3/18/20 22:00


 3/19/20 21:59 DC 3/18/20 22:06


63 MLS/HR


 


 Sodium Chloride


 90 meq/Calcium


 Gluconate 10 meq/


 Multivitamins 10


 ml/Chromium/


 Copper/Manganese/


 Seleni/Zn 1 ml/


 Total Parenteral


 Nutrition/Amino


 Acids/Dextrose/


 Fat Emulsion


 Intravenous  55.005 ml


  @ 2.292


 mls/hr  TPN  CONT  3/18/20 22:00


 3/18/20 12:33 DC  





 


 Sodium Chloride


 90 meq/Magnesium


 Sulfate 10 meq/


 Calcium Gluconate


 20 meq/


 Multivitamins 10


 ml/Chromium/


 Copper/Manganese/


 Seleni/Zn 0.5 ml/


 Total Parenteral


 Nutrition/Amino


 Acids/Dextrose/


 Fat Emulsion


 Intravenous  1,512 ml @ 


 63 mls/hr  TPN  CONT  3/19/20 22:00


 3/20/20 21:59 DC 3/19/20 22:25


63 MLS/HR


 


 Sodium Chloride


 90 meq/Magnesium


 Sulfate 12 meq/


 Calcium Gluconate


 15 meq/


 Multivitamins 10


 ml/Chromium/


 Copper/Manganese/


 Seleni/Zn 0.5 ml/


 Insulin Human


 Regular 25 unit/


 Total Parenteral


 Nutrition/Amino


 Acids/Dextrose/


 Fat Emulsion


 Intravenous  1,400 ml @ 


 58.333 mls/


 hr  TPN  CONT  4/8/20 22:00


 4/9/20 21:59 DC 4/8/20 21:41


58.333 MLS/HR


 


 Sodium Chloride


 90 meq/Potassium


 Chloride 15 meq/


 Magnesium Sulfate


 12 meq/Calcium


 Gluconate 15 meq/


 Multivitamins 10


 ml/Chromium/


 Copper/Manganese/


 Seleni/Zn 0.5 ml/


 Insulin Human


 Regular 25 unit/


 Total Parenteral


 Nutrition/Amino


 Acids/Dextrose/


 Fat Emulsion


 Intravenous  1,400 ml @ 


 58.333 mls/


 hr  TPN  CONT  4/7/20 22:00


 4/8/20 21:59 DC 4/7/20 22:13


58.333 MLS/HR


 


 Sodium Chloride


 90 meq/Potassium


 Chloride 15 meq/


 Potassium


 Phosphate 10 mmol/


 Magnesium Sulfate


 8 meq/Calcium


 Gluconate 15 meq/


 Multivitamins 10


 ml/Chromium/


 Copper/Manganese/


 Seleni/Zn 0.5 ml/


 Insulin Human


 Regular 25 unit/


 Total Parenteral


 Nutrition/Amino


 Acids/Dextrose/


 Fat Emulsion


 Intravenous  1,400 ml @ 


 58.333 mls/


 hr  TPN  CONT  4/5/20 22:00


 4/6/20 21:59 DC 4/5/20 21:20


58.333 MLS/HR


 


 Sodium Chloride


 90 meq/Potassium


 Chloride 15 meq/


 Potassium


 Phosphate 10 mmol/


 Magnesium Sulfate


 10 meq/Calcium


 Gluconate 20 meq/


 Multivitamins 10


 ml/Chromium/


 Copper/Manganese/


 Seleni/Zn 0.5 ml/


 Total Parenteral


 Nutrition/Amino


 Acids/Dextrose/


 Fat Emulsion


 Intravenous  1,400 ml @ 


 58.333 mls/


 hr  TPN  CONT  3/23/20 22:00


 3/24/20 21:59 DC 3/23/20 21:42


58.333 MLS/HR


 


 Sodium Chloride


 90 meq/Potassium


 Chloride 15 meq/


 Potassium


 Phosphate 10 mmol/


 Magnesium Sulfate


 12 meq/Calcium


 Gluconate 15 meq/


 Multivitamins 10


 ml/Chromium/


 Copper/Manganese/


 Seleni/Zn 0.5 ml/


 Insulin Human


 Regular 25 unit/


 Total Parenteral


 Nutrition/Amino


 Acids/Dextrose/


 Fat Emulsion


 Intravenous  1,400 ml @ 


 58.333 mls/


 hr  TPN  CONT  4/6/20 22:00


 4/7/20 21:59 DC 4/6/20 22:24


58.333 MLS/HR


 


 Sodium Chloride


 90 meq/Potassium


 Chloride 15 meq/


 Potassium


 Phosphate 15 mmol/


 Magnesium Sulfate


 10 meq/Calcium


 Gluconate 15 meq/


 Multivitamins 10


 ml/Chromium/


 Copper/Manganese/


 Seleni/Zn 0.5 ml/


 Total Parenteral


 Nutrition/Amino


 Acids/Dextrose/


 Fat Emulsion


 Intravenous  1,400 ml @ 


 58.333 mls/


 hr  TPN  CONT  3/24/20 22:00


 3/25/20 21:59 DC 3/24/20 22:17


58.333 MLS/HR


 


 Sodium Chloride


 90 meq/Potassium


 Chloride 15 meq/


 Potassium


 Phosphate 15 mmol/


 Magnesium Sulfate


 10 meq/Calcium


 Gluconate 20 meq/


 Multivitamins 10


 ml/Chromium/


 Copper/Manganese/


 Seleni/Zn 0.5 ml/


 Total Parenteral


 Nutrition/Amino


 Acids/Dextrose/


 Fat Emulsion


 Intravenous  1,200 ml @ 


 50 mls/hr  TPN  CONT  3/22/20 22:00


 3/22/20 14:17 DC  





 


 Sodium Chloride


 90 meq/Potassium


 Chloride 15 meq/


 Potassium


 Phosphate 18 mmol/


 Magnesium Sulfate


 8 meq/Calcium


 Gluconate 15 meq/


 Multivitamins 10


 ml/Chromium/


 Copper/Manganese/


 Seleni/Zn 0.5 ml/


 Insulin Human


 Regular 10 unit/


 Total Parenteral


 Nutrition/Amino


 Acids/Dextrose/


 Fat Emulsion


 Intravenous  1,400 ml @ 


 58.333 mls/


 hr  TPN  CONT  3/27/20 22:00


 3/28/20 21:59 DC 3/27/20 21:43


58.333 MLS/HR


 


 Sodium Chloride


 90 meq/Potassium


 Chloride 15 meq/


 Potassium


 Phosphate 18 mmol/


 Magnesium Sulfate


 8 meq/Calcium


 Gluconate 15 meq/


 Multivitamins 10


 ml/Chromium/


 Copper/Manganese/


 Seleni/Zn 0.5 ml/


 Insulin Human


 Regular 15 unit/


 Total Parenteral


 Nutrition/Amino


 Acids/Dextrose/


 Fat Emulsion


 Intravenous  1,400 ml @ 


 58.333 mls/


 hr  TPN  CONT  3/30/20 22:00


 3/31/20 21:59 DC 3/30/20 21:47


58.333 MLS/HR


 


 Sodium Chloride


 90 meq/Potassium


 Chloride 15 meq/


 Potassium


 Phosphate 18 mmol/


 Magnesium Sulfate


 8 meq/Calcium


 Gluconate 15 meq/


 Multivitamins 10


 ml/Chromium/


 Copper/Manganese/


 Seleni/Zn 0.5 ml/


 Insulin Human


 Regular 20 unit/


 Total Parenteral


 Nutrition/Amino


 Acids/Dextrose/


 Fat Emulsion


 Intravenous  1,400 ml @ 


 58.333 mls/


 hr  TPN  CONT  4/2/20 22:00


 4/3/20 21:59 DC 4/2/20 22:45


58.333 MLS/HR


 


 Sodium Chloride


 90 meq/Potassium


 Chloride 15 meq/


 Potassium


 Phosphate 18 mmol/


 Magnesium Sulfate


 8 meq/Calcium


 Gluconate 15 meq/


 Multivitamins 10


 ml/Chromium/


 Copper/Manganese/


 Seleni/Zn 0.5 ml/


 Total Parenteral


 Nutrition/Amino


 Acids/Dextrose/


 Fat Emulsion


 Intravenous  1,400 ml @ 


 58.333 mls/


 hr  TPN  CONT  3/26/20 22:00


 3/27/20 21:59 DC 3/26/20 22:00


58.333 MLS/HR


 


 Sodium Chloride


 90 meq/Potassium


 Phosphate 15 mmol/


 Magnesium Sulfate


 12 meq/Calcium


 Gluconate 15 meq/


 Multivitamins 10


 ml/Chromium/


 Copper/Manganese/


 Seleni/Zn 0.5 ml/


 Insulin Human


 Regular 30 unit/


 Total Parenteral


 Nutrition/Amino


 Acids/Dextrose/


 Fat Emulsion


 Intravenous  1,400 ml @ 


 58.333 mls/


 hr  TPN  CONT  4/10/20 22:00


 4/11/20 21:59 DC 4/10/20 21:49


58.333 MLS/HR


 


 Sodium Chloride


 90 meq/Potassium


 Phosphate 15 mmol/


 Magnesium Sulfate


 12 meq/Calcium


 Gluconate 15 meq/


 Multivitamins 10


 ml/Chromium/


 Copper/Manganese/


 Seleni/Zn 0.5 ml/


 Insulin Human


 Regular 40 unit/


 Total Parenteral


 Nutrition/Amino


 Acids/Dextrose/


 Fat Emulsion


 Intravenous  1,400 ml @ 


 58.333 mls/


 hr  TPN  CONT  4/11/20 22:00


 4/12/20 21:59 DC 4/11/20 21:21


58.333 MLS/HR


 


 Sodium Chloride


 90 meq/Potassium


 Phosphate 19 mmol/


 Magnesium Sulfate


 12 meq/Calcium


 Gluconate 15 meq/


 Multivitamins 10


 ml/Chromium/


 Copper/Manganese/


 Seleni/Zn 0.5 ml/


 Insulin Human


 Regular 40 unit/


 Total Parenteral


 Nutrition/Amino


 Acids/Dextrose/


 Fat Emulsion


 Intravenous  1,400 ml @ 


 58.333 mls/


 hr  TPN  CONT  4/12/20 22:00


 4/13/20 21:59 DC 4/12/20 21:54


58.333 MLS/HR


 


 Sodium Chloride


 90 meq/Potassium


 Phosphate 5 mmol/


 Magnesium Sulfate


 12 meq/Calcium


 Gluconate 15 meq/


 Multivitamins 10


 ml/Chromium/


 Copper/Manganese/


 Seleni/Zn 0.5 ml/


 Insulin Human


 Regular 30 unit/


 Total Parenteral


 Nutrition/Amino


 Acids/Dextrose/


 Fat Emulsion


 Intravenous  1,400 ml @ 


 58.333 mls/


 hr  TPN  CONT  4/9/20 22:00


 4/10/20 21:59 DC 4/9/20 22:08


58.333 MLS/HR


 


 Sodium Chloride


 100 meq/Potassium


 Chloride 40 meq/


 Magnesium Sulfate


 15 meq/Calcium


 Gluconate 15 meq/


 Multivitamins 10


 ml/Chromium/


 Copper/Manganese/


 Seleni/Zn 0.5 ml/


 Insulin Human


 Regular 35 unit/


 Total Parenteral


 Nutrition/Amino


 Acids/Dextrose/


 Fat Emulsion


 Intravenous  1,400 ml @ 


 58.333 mls/


 hr  TPN  CONT  4/19/20 22:00


 4/20/20 21:59  4/19/20 22:46


58.333 MLS/HR


 


 Sodium Chloride


 100 meq/Potassium


 Phosphate 10 mmol/


 Magnesium Sulfate


 12 meq/Calcium


 Gluconate 15 meq/


 Multivitamins 10


 ml/Chromium/


 Copper/Manganese/


 Seleni/Zn 0.5 ml/


 Insulin Human


 Regular 35 unit/


 Potassium


 Chloride 20 meq/


 Total Parenteral


 Nutrition/Amino


 Acids/Dextrose/


 Fat Emulsion


 Intravenous  1,400 ml @ 


 58.333 mls/


 hr  TPN  CONT  4/16/20 22:00


 4/17/20 21:59 DC 4/16/20 22:10


58.333 MLS/HR


 


 Sodium Chloride


 100 meq/Potassium


 Phosphate 19 mmol/


 Magnesium Sulfate


 12 meq/Calcium


 Gluconate 15 meq/


 Multivitamins 10


 ml/Chromium/


 Copper/Manganese/


 Seleni/Zn 0.5 ml/


 Insulin Human


 Regular 40 unit/


 Potassium


 Chloride 20 meq/


 Total Parenteral


 Nutrition/Amino


 Acids/Dextrose/


 Fat Emulsion


 Intravenous  1,400 ml @ 


 58.333 mls/


 hr  TPN  CONT  4/15/20 22:00


 4/16/20 21:59 DC 4/15/20 21:20


58.333 MLS/HR


 


 Sodium Chloride


 100 meq/Potassium


 Phosphate 5 mmol/


 Magnesium Sulfate


 12 meq/Calcium


 Gluconate 15 meq/


 Multivitamins 10


 ml/Chromium/


 Copper/Manganese/


 Seleni/Zn 0.5 ml/


 Insulin Human


 Regular 35 unit/


 Potassium


 Chloride 20 meq/


 Total Parenteral


 Nutrition/Amino


 Acids/Dextrose/


 Fat Emulsion


 Intravenous  1,400 ml @ 


 58.333 mls/


 hr  TPN  CONT  4/17/20 22:00


 4/18/20 21:59 DC 4/17/20 22:59


58.333 MLS/HR


 


 Succinylcholine


 Chloride


  (Anectine)  120 mg  1X  ONCE  3/23/20 08:30


 3/23/20 08:31 DC 3/23/20 08:34


120 MG








Lab





Laboratory Tests








Test


 4/19/20


17:24 4/20/20


00:41 4/20/20


05:50 4/20/20


06:37


 


Glucose (Fingerstick)


 117 mg/dL


(70-99) 147 mg/dL


(70-99) 


 136 mg/dL


(70-99)


 


White Blood Count


 


 


 10.4 x10^3/uL


(4.0-11.0) 





 


Red Blood Count


 


 


 2.50 x10^6/uL


(3.50-5.40) 





 


Hemoglobin


 


 


 7.7 g/dL


(12.0-15.5) 





 


Hematocrit


 


 


 23.3 %


(36.0-47.0) 





 


Mean Corpuscular Volume   93 fL ()  


 


Mean Corpuscular Hemoglobin   31 pg (25-35)  


 


Mean Corpuscular Hemoglobin


Concent 


 


 33 g/dL


(31-37) 





 


Red Cell Distribution Width


 


 


 18.1 %


(11.5-14.5) 





 


Platelet Count


 


 


 545 x10^3/uL


(140-400) 





 


Neutrophils (%) (Auto)   75 % (31-73)  


 


Lymphocytes (%) (Auto)   15 % (24-48)  


 


Monocytes (%) (Auto)   10 % (0-9)  


 


Eosinophils (%) (Auto)   0 % (0-3)  


 


Basophils (%) (Auto)   0 % (0-3)  


 


Neutrophils # (Auto)


 


 


 7.8 x10^3/uL


(1.8-7.7) 





 


Lymphocytes # (Auto)


 


 


 1.6 x10^3/uL


(1.0-4.8) 





 


Monocytes # (Auto)


 


 


 1.0 x10^3/uL


(0.0-1.1) 





 


Eosinophils # (Auto)


 


 


 0.0 x10^3/uL


(0.0-0.7) 





 


Basophils # (Auto)


 


 


 0.0 x10^3/uL


(0.0-0.2) 





 


Sodium Level


 


 


 141 mmol/L


(136-145) 





 


Potassium Level


 


 


 3.8 mmol/L


(3.5-5.1) 





 


Chloride Level


 


 


 104 mmol/L


() 





 


Carbon Dioxide Level


 


 


 25 mmol/L


(21-32) 





 


Anion Gap   12 (6-14)  


 


Blood Urea Nitrogen


 


 


 76 mg/dL


(7-20) 





 


Creatinine


 


 


 1.6 mg/dL


(0.6-1.0) 





 


Estimated GFR


(Cockcroft-Gault) 


 


 34.3 


 





 


Glucose Level


 


 


 133 mg/dL


(70-99) 





 


Calcium Level


 


 


 8.8 mg/dL


(8.5-10.1) 





 


Phosphorus Level


 


 


 4.0 mg/dL


(2.6-4.7) 





 


Magnesium Level


 


 


 1.7 mg/dL


(1.8-2.4) 





 


Triglycerides Level


 


 


 144 mg/dL


(0-150) 





 


Test


 4/20/20


12:23 


 


 





 


Glucose (Fingerstick)


 176 mg/dL


(70-99) 


 


 











Results


All relevant outside records, renal labs, imaging studies, telemetry/EKG's were 

reviewed.











KIMBERLY MYERS MD                Apr 20, 2020 13:15

## 2020-04-20 NOTE — NUR
Patient shakes head 'yes' when asked if having pain with CPOT 3 but patient doesn't have any 
medication ordered for pain, Dr Philip paged. Dr Philip returned page, notified of CPOT 
and no pain meds; order received for Fentanyl 25-50MG [id number] IVP, see orders.

## 2020-04-20 NOTE — PDOC
Subjective:


Subjective:


Indicates some abd pain.





Objective:


Objective:


Reviewed other notes - ?repeat paracentesis


Vital Signs:





                                   Vital Signs








  Date Time  Temp Pulse Resp B/P (MAP) Pulse Ox O2 Delivery O2 Flow Rate FiO2


 


4/20/20 10:00  105 22 166/81 (109) 100 Ventilator  


 


4/20/20 08:00 101.2       





 101.2       








Labs:





Laboratory Tests








Test


 4/19/20


11:30 4/19/20


11:50 4/19/20


17:24 4/20/20


00:41


 


O2 Saturation 96 %    


 


Arterial Blood pH 7.47    


 


Arterial Blood pCO2 at


Patient Temp 36 mmHg 


 


 


 





 


Arterial Blood pO2 at Patient


Temp 88 mmHg 


 


 


 





 


Arterial Blood HCO3 25 mmol/L    


 


Arterial Blood Base Excess 1 mmol/L    


 


FiO2 35%+5peep    


 


Glucose (Fingerstick)  165 mg/dL  117 mg/dL  147 mg/dL 


 


Test


 4/20/20


05:50 4/20/20


06:37 


 





 


White Blood Count 10.4 x10^3/uL    


 


Red Blood Count 2.50 x10^6/uL    


 


Hemoglobin 7.7 g/dL    


 


Hematocrit 23.3 %    


 


Mean Corpuscular Volume 93 fL    


 


Mean Corpuscular Hemoglobin 31 pg    


 


Mean Corpuscular Hemoglobin


Concent 33 g/dL 


 


 


 





 


Red Cell Distribution Width 18.1 %    


 


Platelet Count 545 x10^3/uL    


 


Neutrophils (%) (Auto) 75 %    


 


Lymphocytes (%) (Auto) 15 %    


 


Monocytes (%) (Auto) 10 %    


 


Eosinophils (%) (Auto) 0 %    


 


Basophils (%) (Auto) 0 %    


 


Neutrophils # (Auto) 7.8 x10^3/uL    


 


Lymphocytes # (Auto) 1.6 x10^3/uL    


 


Monocytes # (Auto) 1.0 x10^3/uL    


 


Eosinophils # (Auto) 0.0 x10^3/uL    


 


Basophils # (Auto) 0.0 x10^3/uL    


 


Sodium Level 141 mmol/L    


 


Potassium Level 3.8 mmol/L    


 


Chloride Level 104 mmol/L    


 


Carbon Dioxide Level 25 mmol/L    


 


Anion Gap 12    


 


Blood Urea Nitrogen 76 mg/dL    


 


Creatinine 1.6 mg/dL    


 


Estimated GFR


(Cockcroft-Gault) 34.3 


 


 


 





 


Glucose Level 133 mg/dL    


 


Calcium Level 8.8 mg/dL    


 


Phosphorus Level 4.0 mg/dL    


 


Magnesium Level 1.7 mg/dL    


 


Triglycerides Level 144 mg/dL    


 


Glucose (Fingerstick)  136 mg/dL   





  ANAEROBIC-AEROBIC CULTURE  


                                PENDING





  ANAEROBIC RES 1  


                                PENDING





  AEROBIC CULT  Final  


        Final report





  AEROBIC RES 1  Final  


        Comment





        No growth in 56 - 72 hours.





  GRAM STAIN  Final  


        Final report





  GRAM STAIN RES 1  Final  


        Comment





        No white blood cells seen.





  GRAM STAIN RES 2  Final  


        No organisms seen











  BLOOD CULTURE  Final  


        NO GROWTH AFTER 5 DAYS


Imaging:


CXR 4/19


IMPRESSION:


Tracheostomy tube with no pneumothorax but bilateral pleural effusions and wor

sening bilateral atelectasis/airspace opacities.





Speech Path


Referred for swallowing eval. Following for appropriate timing of eval as pt has

con't on vent this week. Will hold swallow eval to allow opportunity for vent 

weaning. If vent use becomes long-term, could consider pursuing in-line PM Valve

to optimize swallow function potential and address further assessment and tx 

when valve in place. (Currently have valves only for trachs, would need to 

special order in line valve for vent). Will f/u as indicated.





PE:





GEN: chronically ill


HEENT:trach, NG bilious


LUNGS: clear


HEART: tachycardic


ABD: distended, tender


EXTREMITY: BLE edema


NEURO/PSYCH: awake





A/P:


Gallstone pancreatitis w/ necrosis, MOSF, fever





--


Will review w/ Dr. Bhatia.





Hemodynamically unstable?:  No


Is patient in severe pain?:  Yes


Is NPO status required?:  Yes











RAGHAVENDRA MURCIA         Apr 20, 2020 10:45

## 2020-04-20 NOTE — PDOC
PULMONARY PROGRESS NOTES


Subjective


Patient intubated on 3/23 , s/p trach 4/6, 


Patient responding to verbal stimuli


Vitals





Vital Signs








  Date Time  Temp Pulse Resp B/P (MAP) Pulse Ox O2 Delivery O2 Flow Rate FiO2


 


4/20/20 10:00  105 22 166/81 (109) 100 Ventilator  


 


4/20/20 08:00 101.2       





 101.2       








Comments


ros unable to obtain  on vent


Lungs:  Other (dimished in BLL  nc at perrl   nose clear   neck trach site ok   

no lad  no thyromegaly)


Cardiovascular:  S1, S2


Abdomen:  Soft, Non-tender, Other (distended)


Extremities:  Other (+3 generalized edema )


Skin:  Warm, Dry


Labs





Laboratory Tests








Test


 4/18/20


14:41 4/18/20


18:13 4/19/20


00:57 4/19/20


06:50


 


Glucose (Fingerstick)


 134 mg/dL


(70-99) 135 mg/dL


(70-99) 147 mg/dL


(70-99) 





 


White Blood Count


 


 


 


 6.9 x10^3/uL


(4.0-11.0)


 


Red Blood Count


 


 


 


 2.28 x10^6/uL


(3.50-5.40)


 


Hemoglobin


 


 


 


 7.0 g/dL


(12.0-15.5)


 


Hematocrit


 


 


 


 21.2 %


(36.0-47.0)


 


Mean Corpuscular Volume    93 fL () 


 


Mean Corpuscular Hemoglobin    31 pg (25-35) 


 


Mean Corpuscular Hemoglobin


Concent 


 


 


 33 g/dL


(31-37)


 


Red Cell Distribution Width


 


 


 


 18.5 %


(11.5-14.5)


 


Platelet Count


 


 


 


 497 x10^3/uL


(140-400)


 


Neutrophils (%) (Auto)    76 % (31-73) 


 


Lymphocytes (%) (Auto)    12 % (24-48) 


 


Monocytes (%) (Auto)    11 % (0-9) 


 


Eosinophils (%) (Auto)    0 % (0-3) 


 


Basophils (%) (Auto)    0 % (0-3) 


 


Neutrophils # (Auto)


 


 


 


 5.3 x10^3/uL


(1.8-7.7)


 


Lymphocytes # (Auto)


 


 


 


 0.8 x10^3/uL


(1.0-4.8)


 


Monocytes # (Auto)


 


 


 


 0.8 x10^3/uL


(0.0-1.1)


 


Eosinophils # (Auto)


 


 


 


 0.0 x10^3/uL


(0.0-0.7)


 


Basophils # (Auto)


 


 


 


 0.0 x10^3/uL


(0.0-0.2)


 


Sodium Level


 


 


 


 140 mmol/L


(136-145)


 


Potassium Level


 


 


 


 3.8 mmol/L


(3.5-5.1)


 


Chloride Level


 


 


 


 103 mmol/L


()


 


Carbon Dioxide Level


 


 


 


 27 mmol/L


(21-32)


 


Anion Gap    10 (6-14) 


 


Blood Urea Nitrogen


 


 


 


 65 mg/dL


(7-20)


 


Creatinine


 


 


 


 1.4 mg/dL


(0.6-1.0)


 


Estimated GFR


(Cockcroft-Gault) 


 


 


 40.0 





 


BUN/Creatinine Ratio    46 (6-20) 


 


Glucose Level


 


 


 


 136 mg/dL


(70-99)


 


Calcium Level


 


 


 


 8.4 mg/dL


(8.5-10.1)


 


Magnesium Level


 


 


 


 1.8 mg/dL


(1.8-2.4)


 


Total Bilirubin


 


 


 


 0.5 mg/dL


(0.2-1.0)


 


Aspartate Amino Transf


(AST/SGOT) 


 


 


 21 U/L (15-37) 





 


Alanine Aminotransferase


(ALT/SGPT) 


 


 


 11 U/L (14-59) 





 


Alkaline Phosphatase


 


 


 


 47 U/L


()


 


Total Protein


 


 


 


 5.2 g/dL


(6.4-8.2)


 


Albumin


 


 


 


 2.8 g/dL


(3.4-5.0)


 


Albumin/Globulin Ratio    1.2 (1.0-1.7) 


 


Test


 4/19/20


06:58 4/19/20


11:30 4/19/20


11:50 4/19/20


17:24


 


Glucose (Fingerstick)


 126 mg/dL


(70-99) 


 165 mg/dL


(70-99) 117 mg/dL


(70-99)


 


O2 Saturation  96 % (92-99)   


 


Arterial Blood pH


 


 7.47


(7.35-7.45) 


 





 


Arterial Blood pCO2 at


Patient Temp 


 36 mmHg


(35-46) 


 





 


Arterial Blood pO2 at Patient


Temp 


 88 mmHg


() 


 





 


Arterial Blood HCO3


 


 25 mmol/L


(21-28) 


 





 


Arterial Blood Base Excess


 


 1 mmol/L


(-3-3) 


 





 


FiO2  35%+5peep   


 


Test


 4/20/20


00:41 4/20/20


05:50 4/20/20


06:37 





 


Glucose (Fingerstick)


 147 mg/dL


(70-99) 


 136 mg/dL


(70-99) 





 


White Blood Count


 


 10.4 x10^3/uL


(4.0-11.0) 


 





 


Red Blood Count


 


 2.50 x10^6/uL


(3.50-5.40) 


 





 


Hemoglobin


 


 7.7 g/dL


(12.0-15.5) 


 





 


Hematocrit


 


 23.3 %


(36.0-47.0) 


 





 


Mean Corpuscular Volume  93 fL ()   


 


Mean Corpuscular Hemoglobin  31 pg (25-35)   


 


Mean Corpuscular Hemoglobin


Concent 


 33 g/dL


(31-37) 


 





 


Red Cell Distribution Width


 


 18.1 %


(11.5-14.5) 


 





 


Platelet Count


 


 545 x10^3/uL


(140-400) 


 





 


Neutrophils (%) (Auto)  75 % (31-73)   


 


Lymphocytes (%) (Auto)  15 % (24-48)   


 


Monocytes (%) (Auto)  10 % (0-9)   


 


Eosinophils (%) (Auto)  0 % (0-3)   


 


Basophils (%) (Auto)  0 % (0-3)   


 


Neutrophils # (Auto)


 


 7.8 x10^3/uL


(1.8-7.7) 


 





 


Lymphocytes # (Auto)


 


 1.6 x10^3/uL


(1.0-4.8) 


 





 


Monocytes # (Auto)


 


 1.0 x10^3/uL


(0.0-1.1) 


 





 


Eosinophils # (Auto)


 


 0.0 x10^3/uL


(0.0-0.7) 


 





 


Basophils # (Auto)


 


 0.0 x10^3/uL


(0.0-0.2) 


 





 


Sodium Level


 


 141 mmol/L


(136-145) 


 





 


Potassium Level


 


 3.8 mmol/L


(3.5-5.1) 


 





 


Chloride Level


 


 104 mmol/L


() 


 





 


Carbon Dioxide Level


 


 25 mmol/L


(21-32) 


 





 


Anion Gap  12 (6-14)   


 


Blood Urea Nitrogen


 


 76 mg/dL


(7-20) 


 





 


Creatinine


 


 1.6 mg/dL


(0.6-1.0) 


 





 


Estimated GFR


(Cockcroft-Gault) 


 34.3 


 


 





 


Glucose Level


 


 133 mg/dL


(70-99) 


 





 


Calcium Level


 


 8.8 mg/dL


(8.5-10.1) 


 





 


Phosphorus Level


 


 4.0 mg/dL


(2.6-4.7) 


 





 


Magnesium Level


 


 1.7 mg/dL


(1.8-2.4) 


 





 


Triglycerides Level


 


 144 mg/dL


(0-150) 


 











Laboratory Tests








Test


 4/19/20


11:30 4/19/20


11:50 4/19/20


17:24 4/20/20


00:41


 


O2 Saturation 96 % (92-99)    


 


Arterial Blood pH


 7.47


(7.35-7.45) 


 


 





 


Arterial Blood pCO2 at


Patient Temp 36 mmHg


(35-46) 


 


 





 


Arterial Blood pO2 at Patient


Temp 88 mmHg


() 


 


 





 


Arterial Blood HCO3


 25 mmol/L


(21-28) 


 


 





 


Arterial Blood Base Excess


 1 mmol/L


(-3-3) 


 


 





 


FiO2 35%+5peep    


 


Glucose (Fingerstick)


 


 165 mg/dL


(70-99) 117 mg/dL


(70-99) 147 mg/dL


(70-99)


 


Test


 4/20/20


05:50 4/20/20


06:37 


 





 


White Blood Count


 10.4 x10^3/uL


(4.0-11.0) 


 


 





 


Red Blood Count


 2.50 x10^6/uL


(3.50-5.40) 


 


 





 


Hemoglobin


 7.7 g/dL


(12.0-15.5) 


 


 





 


Hematocrit


 23.3 %


(36.0-47.0) 


 


 





 


Mean Corpuscular Volume 93 fL ()    


 


Mean Corpuscular Hemoglobin 31 pg (25-35)    


 


Mean Corpuscular Hemoglobin


Concent 33 g/dL


(31-37) 


 


 





 


Red Cell Distribution Width


 18.1 %


(11.5-14.5) 


 


 





 


Platelet Count


 545 x10^3/uL


(140-400) 


 


 





 


Neutrophils (%) (Auto) 75 % (31-73)    


 


Lymphocytes (%) (Auto) 15 % (24-48)    


 


Monocytes (%) (Auto) 10 % (0-9)    


 


Eosinophils (%) (Auto) 0 % (0-3)    


 


Basophils (%) (Auto) 0 % (0-3)    


 


Neutrophils # (Auto)


 7.8 x10^3/uL


(1.8-7.7) 


 


 





 


Lymphocytes # (Auto)


 1.6 x10^3/uL


(1.0-4.8) 


 


 





 


Monocytes # (Auto)


 1.0 x10^3/uL


(0.0-1.1) 


 


 





 


Eosinophils # (Auto)


 0.0 x10^3/uL


(0.0-0.7) 


 


 





 


Basophils # (Auto)


 0.0 x10^3/uL


(0.0-0.2) 


 


 





 


Sodium Level


 141 mmol/L


(136-145) 


 


 





 


Potassium Level


 3.8 mmol/L


(3.5-5.1) 


 


 





 


Chloride Level


 104 mmol/L


() 


 


 





 


Carbon Dioxide Level


 25 mmol/L


(21-32) 


 


 





 


Anion Gap 12 (6-14)    


 


Blood Urea Nitrogen


 76 mg/dL


(7-20) 


 


 





 


Creatinine


 1.6 mg/dL


(0.6-1.0) 


 


 





 


Estimated GFR


(Cockcroft-Gault) 34.3 


 


 


 





 


Glucose Level


 133 mg/dL


(70-99) 


 


 





 


Calcium Level


 8.8 mg/dL


(8.5-10.1) 


 


 





 


Phosphorus Level


 4.0 mg/dL


(2.6-4.7) 


 


 





 


Magnesium Level


 1.7 mg/dL


(1.8-2.4) 


 


 





 


Triglycerides Level


 144 mg/dL


(0-150) 


 


 





 


Glucose (Fingerstick)


 


 136 mg/dL


(70-99) 


 











Medications





Active Scripts








 Medications  Dose


 Route/Sig


 Max Daily Dose Days Date Category


 


 Bisoprolol


 Fumarate 5 Mg


 Tablet  10 Mg


 PO DAILY


   3/16/20 Reported











Impression


.


IMPRESSION:


1.  Acute hypoxemic respiratory failure secondary to ARDS status post trach,


2.  Gallstone pancreatitis


3.  Severe metabolic acidosis.stable


4.  Acute kidney injury-stable, ON HD-- continue to improve 


5.  Acute gallstone pancreatitis.


6.  Hypoalbuminemia.


7.  Moderate persistent effusions


8.  Fever-


9.  Chronic anemia


10. Covid 19 testing negative


11. Moderate to large ascites-S/P paracentisis


S/P paracentisis with 4 liters removed on 4/15/20





Plan


.


Case discussed with RT, try to wean with pressure support


Nightly AC


hep sq and protonix for prophylaxis


Follow surgery recs


Follow ID rec, abx per id


Follow nephrology recs 


Nutritional support per surgery


continue TPN for nutrition 





DVT/GI PPX 


d/w RN/RT











STEVE MIRANDA MD              Apr 20, 2020 10:21

## 2020-04-20 NOTE — PDOC
Infectious Disease Note


Subjective


Subjective


Not feeling well


Remains on vent via trach, FiO2 35 % PEEP 5 SpO2 100%


TPN 


Fever 100.5 last evening





Vital Sign


Vital Signs





Vital Signs








  Date Time  Temp Pulse Resp B/P (MAP) Pulse Ox O2 Delivery O2 Flow Rate FiO2


 


4/20/20 08:03     100 Ventilator  


 


4/20/20 07:00  106 20 127/60 (82)    


 


4/20/20 04:00 100.8       





 100.8       











Physical Exam


PHYSICAL EXAM


GENERAL: Propped up in bed, resting quietly, arouses to voice


HEENT: Pupils equal, + NGT, oral cavity dry 


NECK:  Trach/vent 


LUNGS: rhonchi 


HEART:  S1, S2, regular 


ABDOMEN: Distended, tender, hypoactive BS, 


: [place] (4/14)


EXTREMITIES: Generalized edema, no cyanosis, SCDs bilaterally 


DERMATOLOGIC:  Warm and dry.  No generalized rash.  


CENTRAL NERVOUS SYSTEM: Nods appropriately to few questions, 


HDC & LIJ (4/14) clean





Labs


Lab





Laboratory Tests








Test


 4/19/20


11:30 4/19/20


11:50 4/19/20


17:24 4/20/20


00:41


 


O2 Saturation 96 % (92-99)    


 


Arterial Blood pH


 7.47


(7.35-7.45) 


 


 





 


Arterial Blood pCO2 at


Patient Temp 36 mmHg


(35-46) 


 


 





 


Arterial Blood pO2 at Patient


Temp 88 mmHg


() 


 


 





 


Arterial Blood HCO3


 25 mmol/L


(21-28) 


 


 





 


Arterial Blood Base Excess


 1 mmol/L


(-3-3) 


 


 





 


FiO2 35%+5peep    


 


Glucose (Fingerstick)


 


 165 mg/dL


(70-99) 117 mg/dL


(70-99) 147 mg/dL


(70-99)


 


Test


 4/20/20


05:50 4/20/20


06:37 


 





 


White Blood Count


 10.4 x10^3/uL


(4.0-11.0) 


 


 





 


Red Blood Count


 2.50 x10^6/uL


(3.50-5.40) 


 


 





 


Hemoglobin


 7.7 g/dL


(12.0-15.5) 


 


 





 


Hematocrit


 23.3 %


(36.0-47.0) 


 


 





 


Mean Corpuscular Volume 93 fL ()    


 


Mean Corpuscular Hemoglobin 31 pg (25-35)    


 


Mean Corpuscular Hemoglobin


Concent 33 g/dL


(31-37) 


 


 





 


Red Cell Distribution Width


 18.1 %


(11.5-14.5) 


 


 





 


Platelet Count


 545 x10^3/uL


(140-400) 


 


 





 


Neutrophils (%) (Auto) 75 % (31-73)    


 


Lymphocytes (%) (Auto) 15 % (24-48)    


 


Monocytes (%) (Auto) 10 % (0-9)    


 


Eosinophils (%) (Auto) 0 % (0-3)    


 


Basophils (%) (Auto) 0 % (0-3)    


 


Neutrophils # (Auto)


 7.8 x10^3/uL


(1.8-7.7) 


 


 





 


Lymphocytes # (Auto)


 1.6 x10^3/uL


(1.0-4.8) 


 


 





 


Monocytes # (Auto)


 1.0 x10^3/uL


(0.0-1.1) 


 


 





 


Eosinophils # (Auto)


 0.0 x10^3/uL


(0.0-0.7) 


 


 





 


Basophils # (Auto)


 0.0 x10^3/uL


(0.0-0.2) 


 


 





 


Sodium Level


 141 mmol/L


(136-145) 


 


 





 


Potassium Level


 3.8 mmol/L


(3.5-5.1) 


 


 





 


Chloride Level


 104 mmol/L


() 


 


 





 


Carbon Dioxide Level


 25 mmol/L


(21-32) 


 


 





 


Anion Gap 12 (6-14)    


 


Blood Urea Nitrogen


 76 mg/dL


(7-20) 


 


 





 


Creatinine


 1.6 mg/dL


(0.6-1.0) 


 


 





 


Estimated GFR


(Cockcroft-Gault) 34.3 


 


 


 





 


Glucose Level


 133 mg/dL


(70-99) 


 


 





 


Calcium Level


 8.8 mg/dL


(8.5-10.1) 


 


 





 


Phosphorus Level


 4.0 mg/dL


(2.6-4.7) 


 


 





 


Magnesium Level


 1.7 mg/dL


(1.8-2.4) 


 


 





 


Triglycerides Level


 144 mg/dL


(0-150) 


 


 





 


Glucose (Fingerstick)


 


 136 mg/dL


(70-99) 


 











Micro





Microbiology


3/24/20 Blood Culture - Final, Complete


          NO GROWTH AFTER 5 DAYS





Objective


Assessment


Leukocytosis 4/10 -improved 


Fever


   - New left IJ/[place] 4/14. PICC removed 4/14


   -? pancreatitis. blood cults 4/10 neg. Urine - neg, 


Ascites s/p paracentesis 4/15 - 4300 ml. cultures neg to date 


Severe acute gallstone pancreatitis with necrosis


   -CT 4/14 necrotizing pancreatitis with fluid and phlegmon at the pancreas 


Hypotension off levaphed 


Julian Pleural effusions


JED requiring HD 


   - s/p RIJ temporary dialysis catheter replacement, 4/2


Vent dependent respiratory failure


   -s/p Trach placement 


   -lung opacities, COVID-19 neg 


Anasarca - worse


Anemia - S/p PRBC 3/26


Hypocalcemia 


Prediabetes


HTN


Diarrhea, C. diff neg 3/23


Anemia - S/p PRBCs





Plan


Plan of Care


cont merrem (4/8) and Zyvox (4/10),micafungin, Flagyl 4/14


-previously on cefepime, flagyl & dapto 


Gen surgery following


Maintain aspiration precautions 


Anemia deferred to primary


CBC in am





Critically ill











D/w mother - explained Fever likely sec to pancreatitis and or /Ileus - D/w Dr. Flores earlier in week.  abd again a little more distended today.  May need 

thoracentesis vs sec paracentesis.  Await pulm and GI f/u


labs in am





D/w nursing











LINN FRANZ MD               Apr 20, 2020 08:55

## 2020-04-20 NOTE — PDOC
PROGRESS NOTES


Assessment


Assessment


Respiratory failure.


Seizure.


Metabolic encephalopathy.


Fever 102.7 degree, recurrent fever noted.


Metabolic acidosis.


Diffuse pulmonary infiltrate.


Pleural effusion.


Pancreatitis, necrotizing.


Gallstone.


Leukocytosis.


Lymphopenia.


Electrolytes imbalances.


Hyperglycemia.


DM.


HTN.


HLD.


Anemia.


Abnormal CXR.


Obesity.


Covid-19 still suspected.





RECOMMENDATIONS/PLAN:


Continue life support in CCU at the present time.


Keppra if has further seizures.


Treat medical diseases.


OT/PT.





EEG: No seizure activity.





OBJECTIVE:


4/20/20: No seizures reported over night.





Past Medical History


Cardiovascular:  HTN, Hyperlipidemia


Endocrine:  Diabetes





Family History


Unobtainable.





Social HistoryU


not obtainable





Allergies


Coded Allergies:  


Codeine (Verified  Allergy, Intermediate, rash, 3/16/20)





ROS


Unobtainable.





NEUROLOGICAL  EXAMINATION:


Eyes open from time to time.


Not oriented to time, place and person.


PERRL.


EOMI.


CN: no acute focal findings.


Muscle tone: Decreased.


Muscle strength: 3 UE, e LE.


DTR: 1 UE, 2+ LE.


Plantar reflex: Neutral response bilaterally 


Gait: not able to walk.


Sensory exam: no response to stimuli..


Not able to access cerebellar signs.


F-T-N test not performed due to decreased response. 


Patient examined in CCU.





Objective


Objective





Vital Signs








  Date Time  Temp Pulse Resp B/P (MAP) Pulse Ox O2 Delivery O2 Flow Rate FiO2


 


4/20/20 13:00  116 22 141/70 (93) 100 Ventilator  


 


4/20/20 12:00 101.2       





 101.2       














Intake and Output 


 


 4/20/20





 07:00


 


Intake Total 3962.19 ml


 


Output Total 1985 ml


 


Balance 1977.19 ml


 


 


 


Intake Oral 0 ml


 


IV Total 3722.19 ml


 


Other 240 ml


 


Output Urine Total 1935 ml


 


Gastric Drainage Total 50 ml











Vitals Signs


Vitals





                          VS - Last 72 Hours, by Label








  Date Time  Temp Pulse Resp B/P (MAP) Pulse Ox O2 Delivery O2 Flow Rate FiO2


 


4/20/20 13:00  116 22 141/70 (93) 100 Ventilator  


 


4/20/20 12:00 101.2 107 29 128/82 (97) 100 Ventilator  





 101.2       


 


4/20/20 12:00     100 Ventilator  


 


4/20/20 12:00      Mechanical Ventilator  


 


4/20/20 11:00  116 25 142/82 (102) 100 Ventilator  


 


4/20/20 10:00  105 22 166/81 (109) 100 Ventilator  


 


4/20/20 09:00  115 25 149/86 (107) 98 Ventilator  


 


4/20/20 08:03     100 Ventilator  


 


4/20/20 08:00 101.2 113 24 117/76 (90) 100 Ventilator  





 101.2       


 


4/20/20 08:00      Mechanical Ventilator  


 


4/20/20 07:00  106 20 127/60 (82) 99 Ventilator  


 


4/20/20 06:00  116 23 157/79 (105) 99 Ventilator  


 


4/20/20 05:36     100 Ventilator  


 


4/20/20 05:00  108 39  100 Ventilator  


 


4/20/20 04:11      Mechanical Ventilator  


 


4/20/20 04:00 100.8 118 35 141/83 (102) 100 Ventilator  





 100.8       


 


4/20/20 03:00  104 30 141/83 (102) 100 Ventilator  


 


4/20/20 02:00 100.2 108 29 112/67 (82) 100 Ventilator  





 100.2       


 


4/20/20 01:51     100 Ventilator  


 


4/20/20 01:00  106 26 137/71 (93) 100 Ventilator  


 


4/20/20 00:05     100 Ventilator  


 


4/20/20 00:00 100.5 104 26 140/84 (102) 100 Ventilator  





 100.5       


 


4/20/20 00:00      Mechanical Ventilator  


 


4/19/20 23:00  112 28 154/80 (104) 99 Ventilator  


 


4/19/20 22:12     100 Ventilator  


 


4/19/20 22:00  112 25 152/85 (107) 100 Ventilator  


 


4/19/20 21:00 101.5 100 30 154/92 (112) 100 Ventilator  





 101.5       


 


4/19/20 20:35     100 Ventilator  


 


4/19/20 20:00      Mechanical Ventilator  


 


4/19/20 20:00 100.9 100 28 139/84 (102) 100 Ventilator  





 100.9       


 


4/19/20 19:00  88 27 119/79 (92) 100 Ventilator  


 


4/19/20 18:00  87 26 122/73 (89) 100 Ventilator  


 


4/19/20 17:00  90 24 126/72 (90) 100 Ventilator  


 


4/19/20 16:00 99.6 108 20 130/73 (92) 100 Ventilator  





 99.6       


 


4/19/20 16:00      Mechanical Ventilator  


 


4/19/20 15:53     99 Ventilator  


 


4/19/20 15:00  98 24 141/94 (110) 100 Ventilator  


 


4/19/20 14:00 100.1 86 22 121/75 (90) 100 Ventilator  





 100.1       


 


4/19/20 13:00  83 20 107/63 (78) 100 Ventilator  


 


4/19/20 12:00 101.0 75 19 152/85 (107) 100 Ventilator  





 101.0       


 


4/19/20 12:00      Mechanical Ventilator  


 


4/19/20 11:30     99 Ventilator  


 


4/19/20 11:00  90 24 152/72 (98) 100 Ventilator  


 


4/19/20 10:00  92 22 134/86 (102) 100 Ventilator  


 


4/19/20 09:00  88 22 116/70 (85) 100 Ventilator  


 


4/19/20 08:00 99.0 85 24 110/61 (77) 100 Ventilator  





 99.0       


 


4/19/20 08:00      Mechanical Ventilator  


 


4/19/20 07:43      Ventilator  


 


4/19/20 07:37      Ventilator  


 


4/19/20 07:30     100 Ventilator  


 


4/19/20 07:00  95 18 126/69 (88) 100 Ventilator  











Laboratory


Laboratory





Laboratory Tests








Test


 4/19/20


17:24 4/20/20


00:41 4/20/20


05:50 4/20/20


06:37


 


Glucose (Fingerstick)


 117 mg/dL


(70-99) 147 mg/dL


(70-99) 


 136 mg/dL


(70-99)


 


White Blood Count


 


 


 10.4 x10^3/uL


(4.0-11.0) 





 


Red Blood Count


 


 


 2.50 x10^6/uL


(3.50-5.40) 





 


Hemoglobin


 


 


 7.7 g/dL


(12.0-15.5) 





 


Hematocrit


 


 


 23.3 %


(36.0-47.0) 





 


Mean Corpuscular Volume   93 fL ()  


 


Mean Corpuscular Hemoglobin   31 pg (25-35)  


 


Mean Corpuscular Hemoglobin


Concent 


 


 33 g/dL


(31-37) 





 


Red Cell Distribution Width


 


 


 18.1 %


(11.5-14.5) 





 


Platelet Count


 


 


 545 x10^3/uL


(140-400) 





 


Neutrophils (%) (Auto)   75 % (31-73)  


 


Lymphocytes (%) (Auto)   15 % (24-48)  


 


Monocytes (%) (Auto)   10 % (0-9)  


 


Eosinophils (%) (Auto)   0 % (0-3)  


 


Basophils (%) (Auto)   0 % (0-3)  


 


Neutrophils # (Auto)


 


 


 7.8 x10^3/uL


(1.8-7.7) 





 


Lymphocytes # (Auto)


 


 


 1.6 x10^3/uL


(1.0-4.8) 





 


Monocytes # (Auto)


 


 


 1.0 x10^3/uL


(0.0-1.1) 





 


Eosinophils # (Auto)


 


 


 0.0 x10^3/uL


(0.0-0.7) 





 


Basophils # (Auto)


 


 


 0.0 x10^3/uL


(0.0-0.2) 





 


Sodium Level


 


 


 141 mmol/L


(136-145) 





 


Potassium Level


 


 


 3.8 mmol/L


(3.5-5.1) 





 


Chloride Level


 


 


 104 mmol/L


() 





 


Carbon Dioxide Level


 


 


 25 mmol/L


(21-32) 





 


Anion Gap   12 (6-14)  


 


Blood Urea Nitrogen


 


 


 76 mg/dL


(7-20) 





 


Creatinine


 


 


 1.6 mg/dL


(0.6-1.0) 





 


Estimated GFR


(Cockcroft-Gault) 


 


 34.3 


 





 


Glucose Level


 


 


 133 mg/dL


(70-99) 





 


Calcium Level


 


 


 8.8 mg/dL


(8.5-10.1) 





 


Phosphorus Level


 


 


 4.0 mg/dL


(2.6-4.7) 





 


Magnesium Level


 


 


 1.7 mg/dL


(1.8-2.4) 





 


Triglycerides Level


 


 


 144 mg/dL


(0-150) 





 


Test


 4/20/20


12:23 


 


 





 


Glucose (Fingerstick)


 176 mg/dL


(70-99) 


 


 











Microbiology


4/15/20 Aerobic and Anaerobic Culture - Final, Complete


          


4/15/20 Anaerobic Culture Result 1 (ANSON) - Final, Complete


          


4/15/20 Aerobic Culture - Final, Complete


          


4/15/20 Aerobic Culture Result 1 (ANSON) - Final, Complete


          


4/15/20 Gram Stain - Final, Complete


          


4/15/20 Gram Stain Result 1 (ANSON) - Final, Complete


          


4/15/20 Gram Stain Result 2 (ANSON) - Final, Complete


          


4/14/20 Blood Culture - Final, Complete


          NO GROWTH AFTER 5 DAYS


4/12/20 Urine Culture - Final, Complete


          


4/12/20 Urine Culture Result 1 (ANSON) - Final, Complete





Medication


Medications





Current Medications


Sodium Chloride 100 meq/Potassium Chloride 40 meq/ Magnesium Sulfate 15 

meq/Calcium Gluconate 15 meq/ Multivitamins 10 ml/Chromium/ Copper/Manganese/ 

Seleni/Zn 0.5 ml/ Insulin Human Regular 35 unit/ Total Parenteral 

Nutrition/Amino Acids/Dextrose/ Fat Emulsion Intravenous 1,400 ml @  58.333 mls/

hr TPN  CONT IV  Last administered on 4/19/20at 22:46;  Start 4/19/20 at 22:00; 

Stop 4/20/20 at 21:59





Comment


Review of Relevant


I have reviewed the following items josy (where applicable) has been applied.











DORYS LANDA MD                 Apr 20, 2020 13:48

## 2020-04-20 NOTE — PDOC
[facility] PROGRESS NOTE


Chief Complaint


Chief Complaint


Respiratory failure requiring mechanical ventilation (on vent since 3/23)


Tracheostomy


bilateral pleural effusions/pulm edema 


Sepsis


Severe Acute gallstone pancreatitis (not a surgical candidate at this time) with

necrosis


Acute kidney failure now requiring dialysis


Salpingitis


Gallstones (Calculus of gallbladder with acute cholecystitis without 

obstruction)


HTN 


Leukocytosis 


Hypoxia


Uterine fibroid


Intractable pain


Intractable nausea


Covid 19 negative. 


Acute on chronic anemia 


EEG: No seizure activity.


ESRD on HD


Hyperglycemia, persistently in 200s





History of Present Illness


History of Present Illness


4- patient seen and examined in the ICU





She is on the vent via tracheostomy


AC/14/4 50/35%


Has IV TPN


NG to suction


Wearing mitts for patient safety


Still seems encephalopathic


Chart reviewed


Discussed with RN


She remains critically ill











Ms Tadeo is a 50yo F w/ PMHx HTN, prediabetes who presented to the emergency 

room with complaints of abdominal pain on 3/16/2020. Found with Lipase 62965, 

, , Bilirubin 1.4.


CT abdomen confirms pancreatic inflammation, peripancreatic fluid and 

inflammatory changes around the pancreas consistent with pancreatitis. 

Cholelithiasis and 1.4cm uterine fibroid as well as possible left salpingitis. A

dmitted for further care


GI, General surgery, ID, Pulm consulted.





3/17: No urine output. Added dilaudid for pain, PICC placed per IR. Renal US 

negative.Seen bedside in ICU, given 2L additional NSS and albumin infusion. 

Still hypotensive, started on levophed. Repeat CT abdomen w/ necrosis. 


3/18: O2 saturation 87% on nasal cannula oxygen. Dialysis catheter per 

nephrology


3/19: She is now on BiPAP appears more ill, now on dialysis


3/20: Seen on BiPAP. Her mother and another family member are present and seemed

to be good support for her. Currently on dialysis. Appears critically ill


3/21: Overnight Tmax 101.7 , still on BiPAP FiO2 40%, still on low dose Levophed

gtt, TPN initiated. On dialysis


4/6: Tracheostomy


4/12: S/p tracheostomy on vent spontaneous respirations with 5 of pressure 

support 35% FiO2, rectal tube and a [place], off pressors


4/13: Off pressors. Seen on dialysis this morning. BUN 80. Tracking with her 

eyes. Still on vent via trach.


4/14: BUN 68, Cr 1.7. Temp 100.2F axillary. WBC 9.8. Still on vent via trach. 

Tracking reasonably well. In obvious discomfort. Removed PICC and CVC LIJ and 

replaced. Tips sent for culture. CT chest/abd/pelvis with bilateral pleural 

effusion and ascites.


4/15: Renal function stable. Still on vent. More interactive today. Miming wish 

for food. Plan discussed for thoracentesis/paracentesis with daughters today. 

They were under impression patient was doing worse due to a miscommunication 

which has been clarified over the phone. 4.3L removed.


4/16: BUN 88, Cr 1.8. Much more interactive today. Appears more comfortable 

today.


4/17: Febrile overnight 101.8F. More interactive, still on vent. Asking for ice 

by miming.


4/18: Afebrile overnight. TMax last 24 hours 100.6F. Hb 7.1. Interactive when 

awake.





Afebrile. Hb 7. Not tolerating vent wean. Very interactive, on low dose 

precedex. Excellent UOP over the past 72 hours





Plan:


Cont vent weaning, dialysis


Trach shield during day if ok with pulm. Would recommend ABG after 4 hours to 

r/o CO2 retention, however and still vent overnight





Vitals/I&O


Vitals/I&O:





                                   Vital Signs








  Date Time  Temp Pulse Resp B/P (MAP) Pulse Ox O2 Delivery O2 Flow Rate FiO2


 


4/20/20 10:00  105 22 166/81 (109) 100 Ventilator  


 


4/20/20 08:00 101.2       





 101.2       














                                    I & O   


 


 4/19/20 4/19/20 4/20/20





 15:00 23:00 07:00


 


Intake Total 540 ml 2345.19 ml 1077 ml


 


Output Total 585 ml 630 ml 770 ml


 


Balance -45 ml 1715.19 ml 307 ml











Physical Exam


Physical Exam:


GENERAL: Propped up in bed, resting quietly, arouses to voice


HEENT: Pupils equal, + NGT, oral cavity dry 


NECK:  Trach/vent 


LUNGS: rhonchi 


HEART:  S1, S2, regular 


ABDOMEN: Distended, tender, hypoactive BS, 


: [place] (4/14)


EXTREMITIES: Generalized edema, no cyanosis, SCDs bilaterally 


DERMATOLOGIC:  Warm and dry.  No generalized rash.  


CENTRAL NERVOUS SYSTEM: Nods appropriately to few questions, 


HDC & LIJ (4/14) clean


General:  mild distress, Other (Seems confused)


Heart:  Regular rate, Normal S1, Other (increased rate)


Lungs:  Crackles, Other (dimished in BLL  nc at perrl   nose clear   neck trach 

site ok   no lad  no thyromegaly)


Abdomen:  No tenderness, Other (distended )


Extremities:  Other (ANASARCA)


Skin:  Other (mottling noted to extremities )





Labs


Labs:





Laboratory Tests








Test


 4/19/20


11:30 4/19/20


11:50 4/19/20


17:24 4/20/20


00:41


 


O2 Saturation 96 % (92-99)    


 


Arterial Blood pH


 7.47


(7.35-7.45) 


 


 





 


Arterial Blood pCO2 at


Patient Temp 36 mmHg


(35-46) 


 


 





 


Arterial Blood pO2 at Patient


Temp 88 mmHg


() 


 


 





 


Arterial Blood HCO3


 25 mmol/L


(21-28) 


 


 





 


Arterial Blood Base Excess


 1 mmol/L


(-3-3) 


 


 





 


FiO2 35%+5peep    


 


Glucose (Fingerstick)


 


 165 mg/dL


(70-99) 117 mg/dL


(70-99) 147 mg/dL


(70-99)


 


Test


 4/20/20


05:50 4/20/20


06:37 


 





 


White Blood Count


 10.4 x10^3/uL


(4.0-11.0) 


 


 





 


Red Blood Count


 2.50 x10^6/uL


(3.50-5.40) 


 


 





 


Hemoglobin


 7.7 g/dL


(12.0-15.5) 


 


 





 


Hematocrit


 23.3 %


(36.0-47.0) 


 


 





 


Mean Corpuscular Volume 93 fL ()    


 


Mean Corpuscular Hemoglobin 31 pg (25-35)    


 


Mean Corpuscular Hemoglobin


Concent 33 g/dL


(31-37) 


 


 





 


Red Cell Distribution Width


 18.1 %


(11.5-14.5) 


 


 





 


Platelet Count


 545 x10^3/uL


(140-400) 


 


 





 


Neutrophils (%) (Auto) 75 % (31-73)    


 


Lymphocytes (%) (Auto) 15 % (24-48)    


 


Monocytes (%) (Auto) 10 % (0-9)    


 


Eosinophils (%) (Auto) 0 % (0-3)    


 


Basophils (%) (Auto) 0 % (0-3)    


 


Neutrophils # (Auto)


 7.8 x10^3/uL


(1.8-7.7) 


 


 





 


Lymphocytes # (Auto)


 1.6 x10^3/uL


(1.0-4.8) 


 


 





 


Monocytes # (Auto)


 1.0 x10^3/uL


(0.0-1.1) 


 


 





 


Eosinophils # (Auto)


 0.0 x10^3/uL


(0.0-0.7) 


 


 





 


Basophils # (Auto)


 0.0 x10^3/uL


(0.0-0.2) 


 


 





 


Sodium Level


 141 mmol/L


(136-145) 


 


 





 


Potassium Level


 3.8 mmol/L


(3.5-5.1) 


 


 





 


Chloride Level


 104 mmol/L


() 


 


 





 


Carbon Dioxide Level


 25 mmol/L


(21-32) 


 


 





 


Anion Gap 12 (6-14)    


 


Blood Urea Nitrogen


 76 mg/dL


(7-20) 


 


 





 


Creatinine


 1.6 mg/dL


(0.6-1.0) 


 


 





 


Estimated GFR


(Cockcroft-Gault) 34.3 


 


 


 





 


Glucose Level


 133 mg/dL


(70-99) 


 


 





 


Calcium Level


 8.8 mg/dL


(8.5-10.1) 


 


 





 


Phosphorus Level


 4.0 mg/dL


(2.6-4.7) 


 


 





 


Magnesium Level


 1.7 mg/dL


(1.8-2.4) 


 


 





 


Triglycerides Level


 144 mg/dL


(0-150) 


 


 





 


Glucose (Fingerstick)


 


 136 mg/dL


(70-99) 


 














Review of Systems


Review of Systems:


Unable to obtain





Assessment and Plan


Assessmemt and Plan


Problems


Medical Problems:


(1) Acute pancreatitis


Status: Acute  





(2) Cholelithiasis


Status: Acute  





Respiratory failure requiring intubation


Status post tracheostomy


Severe Acute gallstone pancreatitis (she is not a surgical candidate as she is 

too ill)


Acute kidney failure now requiring dialysis


Salpingo--itis


Gallstones (Calculus of gallbladder with acute cholecystitis without 

obstruction)


HTN 


Leukocytosis 


Hypoxia


Uterine fibroid


Hypoxia with respiratory failure


Intractable pain


Intractable nausea





Plan


ICU monitoring


Tracheostomy care


NG suctioning


Vent management per pulm. pressure support as tolerated


Dialysis per nephro


Merrem (4/8) and Zyvox (4/10) and micafungin 


Follow cultures


Trach care


Nutritional support


When necessary levo fed


Neuro following


No plans for sx at present as she is to ill still


Discharge disposition pending (? Eventual LTAC)


She is still critically ill


Prognosis very guarded


Total time 33 min





Comment


Review of Relevant


I have reviewed the following items josy (where applicable) has been applied.


Medications:





Current Medications








 Medications


  (Trade)  Dose


 Ordered  Sig/Yee


 Route


 PRN Reason  Start Time


 Stop Time Status Last Admin


Dose Admin


 


 Sodium Chloride


 100 meq/Potassium


 Chloride 40 meq/


 Magnesium Sulfate


 15 meq/Calcium


 Gluconate 15 meq/


 Multivitamins 10


 ml/Chromium/


 Copper/Manganese/


 Seleni/Zn 0.5 ml/


 Insulin Human


 Regular 35 unit/


 Total Parenteral


 Nutrition/Amino


 Acids/Dextrose/


 Fat Emulsion


 Intravenous  1,400 ml @ 


 58.333 mls/


 hr  TPN  CONT


 IV


   4/19/20 22:00


 4/20/20 21:59  4/19/20 22:46














Hemodynamically unstable?:  No


Is patient in severe pain?:  No


Is NPO status required?:  Yes











HECTOR MASON III DO           Apr 20, 2020 10:34

## 2020-04-20 NOTE — NUR
Pt has been febrile most of the night and Tylenol was given. Got patient up to a chair at 
0505 and she sat up until 0600. Tolerated the chair well. She was transferred to the Cardiac 
chair with assist of four. Resting comfortably now.

## 2020-04-20 NOTE — NUR
Patient febrile with temp 101.2, Tylenol Supp administered and Dr Davis paged. Dr Davis 
returned paged, notified of fever, reviewed all blood cultures results and UA results. Per 
Dr Davis, nursing to treat fever but no new orders at this time.

## 2020-04-20 NOTE — PDOC
SAURAV NASH APRN 4/20/20 0922:


SURGICAL PROGRESS NOTE


Subjective


awake on vent


easily falls asleep


Vital Signs





Vital Signs








  Date Time  Temp Pulse Resp B/P (MAP) Pulse Ox O2 Delivery O2 Flow Rate FiO2


 


4/20/20 08:03     100 Ventilator  


 


4/20/20 07:00  106 20 127/60 (82)    


 


4/20/20 04:00 100.8       





 100.8       








I&O











Intake and Output 


 


 4/20/20





 07:00


 


Intake Total 3962.19 ml


 


Output Total 1985 ml


 


Balance 1977.19 ml


 


 


 


Intake Oral 0 ml


 


IV Total 3722.19 ml


 


Other 240 ml


 


Output Urine Total 1935 ml


 


Gastric Drainage Total 50 ml








General:  No acute distress


HEENT:  Other (trach intact)


Abdomen:  Other (distended )


Labs





Laboratory Tests








Test


 4/18/20


14:41 4/18/20


18:13 4/19/20


00:57 4/19/20


06:50


 


Glucose (Fingerstick)


 134 mg/dL


(70-99) 135 mg/dL


(70-99) 147 mg/dL


(70-99) 





 


White Blood Count


 


 


 


 6.9 x10^3/uL


(4.0-11.0)


 


Red Blood Count


 


 


 


 2.28 x10^6/uL


(3.50-5.40)


 


Hemoglobin


 


 


 


 7.0 g/dL


(12.0-15.5)


 


Hematocrit


 


 


 


 21.2 %


(36.0-47.0)


 


Mean Corpuscular Volume    93 fL () 


 


Mean Corpuscular Hemoglobin    31 pg (25-35) 


 


Mean Corpuscular Hemoglobin


Concent 


 


 


 33 g/dL


(31-37)


 


Red Cell Distribution Width


 


 


 


 18.5 %


(11.5-14.5)


 


Platelet Count


 


 


 


 497 x10^3/uL


(140-400)


 


Neutrophils (%) (Auto)    76 % (31-73) 


 


Lymphocytes (%) (Auto)    12 % (24-48) 


 


Monocytes (%) (Auto)    11 % (0-9) 


 


Eosinophils (%) (Auto)    0 % (0-3) 


 


Basophils (%) (Auto)    0 % (0-3) 


 


Neutrophils # (Auto)


 


 


 


 5.3 x10^3/uL


(1.8-7.7)


 


Lymphocytes # (Auto)


 


 


 


 0.8 x10^3/uL


(1.0-4.8)


 


Monocytes # (Auto)


 


 


 


 0.8 x10^3/uL


(0.0-1.1)


 


Eosinophils # (Auto)


 


 


 


 0.0 x10^3/uL


(0.0-0.7)


 


Basophils # (Auto)


 


 


 


 0.0 x10^3/uL


(0.0-0.2)


 


Sodium Level


 


 


 


 140 mmol/L


(136-145)


 


Potassium Level


 


 


 


 3.8 mmol/L


(3.5-5.1)


 


Chloride Level


 


 


 


 103 mmol/L


()


 


Carbon Dioxide Level


 


 


 


 27 mmol/L


(21-32)


 


Anion Gap    10 (6-14) 


 


Blood Urea Nitrogen


 


 


 


 65 mg/dL


(7-20)


 


Creatinine


 


 


 


 1.4 mg/dL


(0.6-1.0)


 


Estimated GFR


(Cockcroft-Gault) 


 


 


 40.0 





 


BUN/Creatinine Ratio    46 (6-20) 


 


Glucose Level


 


 


 


 136 mg/dL


(70-99)


 


Calcium Level


 


 


 


 8.4 mg/dL


(8.5-10.1)


 


Magnesium Level


 


 


 


 1.8 mg/dL


(1.8-2.4)


 


Total Bilirubin


 


 


 


 0.5 mg/dL


(0.2-1.0)


 


Aspartate Amino Transf


(AST/SGOT) 


 


 


 21 U/L (15-37) 





 


Alanine Aminotransferase


(ALT/SGPT) 


 


 


 11 U/L (14-59) 





 


Alkaline Phosphatase


 


 


 


 47 U/L


()


 


Total Protein


 


 


 


 5.2 g/dL


(6.4-8.2)


 


Albumin


 


 


 


 2.8 g/dL


(3.4-5.0)


 


Albumin/Globulin Ratio    1.2 (1.0-1.7) 


 


Test


 4/19/20


06:58 4/19/20


11:30 4/19/20


11:50 4/19/20


17:24


 


Glucose (Fingerstick)


 126 mg/dL


(70-99) 


 165 mg/dL


(70-99) 117 mg/dL


(70-99)


 


O2 Saturation  96 % (92-99)   


 


Arterial Blood pH


 


 7.47


(7.35-7.45) 


 





 


Arterial Blood pCO2 at


Patient Temp 


 36 mmHg


(35-46) 


 





 


Arterial Blood pO2 at Patient


Temp 


 88 mmHg


() 


 





 


Arterial Blood HCO3


 


 25 mmol/L


(21-28) 


 





 


Arterial Blood Base Excess


 


 1 mmol/L


(-3-3) 


 





 


FiO2  35%+5peep   


 


Test


 4/20/20


00:41 4/20/20


05:50 4/20/20


06:37 





 


Glucose (Fingerstick)


 147 mg/dL


(70-99) 


 136 mg/dL


(70-99) 





 


White Blood Count


 


 10.4 x10^3/uL


(4.0-11.0) 


 





 


Red Blood Count


 


 2.50 x10^6/uL


(3.50-5.40) 


 





 


Hemoglobin


 


 7.7 g/dL


(12.0-15.5) 


 





 


Hematocrit


 


 23.3 %


(36.0-47.0) 


 





 


Mean Corpuscular Volume  93 fL ()   


 


Mean Corpuscular Hemoglobin  31 pg (25-35)   


 


Mean Corpuscular Hemoglobin


Concent 


 33 g/dL


(31-37) 


 





 


Red Cell Distribution Width


 


 18.1 %


(11.5-14.5) 


 





 


Platelet Count


 


 545 x10^3/uL


(140-400) 


 





 


Neutrophils (%) (Auto)  75 % (31-73)   


 


Lymphocytes (%) (Auto)  15 % (24-48)   


 


Monocytes (%) (Auto)  10 % (0-9)   


 


Eosinophils (%) (Auto)  0 % (0-3)   


 


Basophils (%) (Auto)  0 % (0-3)   


 


Neutrophils # (Auto)


 


 7.8 x10^3/uL


(1.8-7.7) 


 





 


Lymphocytes # (Auto)


 


 1.6 x10^3/uL


(1.0-4.8) 


 





 


Monocytes # (Auto)


 


 1.0 x10^3/uL


(0.0-1.1) 


 





 


Eosinophils # (Auto)


 


 0.0 x10^3/uL


(0.0-0.7) 


 





 


Basophils # (Auto)


 


 0.0 x10^3/uL


(0.0-0.2) 


 





 


Sodium Level


 


 141 mmol/L


(136-145) 


 





 


Potassium Level


 


 3.8 mmol/L


(3.5-5.1) 


 





 


Chloride Level


 


 104 mmol/L


() 


 





 


Carbon Dioxide Level


 


 25 mmol/L


(21-32) 


 





 


Anion Gap  12 (6-14)   


 


Blood Urea Nitrogen


 


 76 mg/dL


(7-20) 


 





 


Creatinine


 


 1.6 mg/dL


(0.6-1.0) 


 





 


Estimated GFR


(Cockcroft-Gault) 


 34.3 


 


 





 


Glucose Level


 


 133 mg/dL


(70-99) 


 





 


Calcium Level


 


 8.8 mg/dL


(8.5-10.1) 


 





 


Phosphorus Level


 


 4.0 mg/dL


(2.6-4.7) 


 





 


Magnesium Level


 


 1.7 mg/dL


(1.8-2.4) 


 





 


Triglycerides Level


 


 144 mg/dL


(0-150) 


 











Laboratory Tests








Test


 4/19/20


11:30 4/19/20


11:50 4/19/20


17:24 4/20/20


00:41


 


O2 Saturation 96 % (92-99)    


 


Arterial Blood pH


 7.47


(7.35-7.45) 


 


 





 


Arterial Blood pCO2 at


Patient Temp 36 mmHg


(35-46) 


 


 





 


Arterial Blood pO2 at Patient


Temp 88 mmHg


() 


 


 





 


Arterial Blood HCO3


 25 mmol/L


(21-28) 


 


 





 


Arterial Blood Base Excess


 1 mmol/L


(-3-3) 


 


 





 


FiO2 35%+5peep    


 


Glucose (Fingerstick)


 


 165 mg/dL


(70-99) 117 mg/dL


(70-99) 147 mg/dL


(70-99)


 


Test


 4/20/20


05:50 4/20/20


06:37 


 





 


White Blood Count


 10.4 x10^3/uL


(4.0-11.0) 


 


 





 


Red Blood Count


 2.50 x10^6/uL


(3.50-5.40) 


 


 





 


Hemoglobin


 7.7 g/dL


(12.0-15.5) 


 


 





 


Hematocrit


 23.3 %


(36.0-47.0) 


 


 





 


Mean Corpuscular Volume 93 fL ()    


 


Mean Corpuscular Hemoglobin 31 pg (25-35)    


 


Mean Corpuscular Hemoglobin


Concent 33 g/dL


(31-37) 


 


 





 


Red Cell Distribution Width


 18.1 %


(11.5-14.5) 


 


 





 


Platelet Count


 545 x10^3/uL


(140-400) 


 


 





 


Neutrophils (%) (Auto) 75 % (31-73)    


 


Lymphocytes (%) (Auto) 15 % (24-48)    


 


Monocytes (%) (Auto) 10 % (0-9)    


 


Eosinophils (%) (Auto) 0 % (0-3)    


 


Basophils (%) (Auto) 0 % (0-3)    


 


Neutrophils # (Auto)


 7.8 x10^3/uL


(1.8-7.7) 


 


 





 


Lymphocytes # (Auto)


 1.6 x10^3/uL


(1.0-4.8) 


 


 





 


Monocytes # (Auto)


 1.0 x10^3/uL


(0.0-1.1) 


 


 





 


Eosinophils # (Auto)


 0.0 x10^3/uL


(0.0-0.7) 


 


 





 


Basophils # (Auto)


 0.0 x10^3/uL


(0.0-0.2) 


 


 





 


Sodium Level


 141 mmol/L


(136-145) 


 


 





 


Potassium Level


 3.8 mmol/L


(3.5-5.1) 


 


 





 


Chloride Level


 104 mmol/L


() 


 


 





 


Carbon Dioxide Level


 25 mmol/L


(21-32) 


 


 





 


Anion Gap 12 (6-14)    


 


Blood Urea Nitrogen


 76 mg/dL


(7-20) 


 


 





 


Creatinine


 1.6 mg/dL


(0.6-1.0) 


 


 





 


Estimated GFR


(Cockcroft-Gault) 34.3 


 


 


 





 


Glucose Level


 133 mg/dL


(70-99) 


 


 





 


Calcium Level


 8.8 mg/dL


(8.5-10.1) 


 


 





 


Phosphorus Level


 4.0 mg/dL


(2.6-4.7) 


 


 





 


Magnesium Level


 1.7 mg/dL


(1.8-2.4) 


 


 





 


Triglycerides Level


 144 mg/dL


(0-150) 


 


 





 


Glucose (Fingerstick)


 


 136 mg/dL


(70-99) 


 











Problem List


Problems


Medical Problems:


(1) Acute pancreatitis


Status: Acute  





(2) Cholelithiasis


Status: Acute  








Assessment/Plan


supportive care





KIEL BAL MD 4/20/20 1148:


SURGICAL PROGRESS NOTE


Assessment/Plan








Tom Flores


pt seen


sleeping


some fevers last few days


CXR from 4/19 seen





as above


no new surgical recs











SAURAV NASH APRN            Apr 20, 2020 09:22


KIEL BAL MD                Apr 20, 2020 11:48

## 2020-04-20 NOTE — NUR
Pharmacy TPN Dosing Note



S: JESENIA RICH is a 49 year old F Currently receiving Central Continuous TPN started 
03/18/20



B:Pertinent PMH: 

Necrotizing pancreatitis

Height: 5 feet, 8 inches

Weight: 106.4 kg



Current diet: NPO 



LABS:

Sodium:    141 

Potassium: 3.8 

Chloride:  104 

Calcium:   8.8 

Corrected Calcium: 9.76 

Magnesium: 1.7 

CO2:       25 

SCr:       1.6 

Glucose:   133-176 

Albumin:   2.8 

AST:       23 

ALT:       11 



TPN FORMULA:

TPN TYPE:  Central Continuous

AMINO ACIDS:         125 gm

DEXTROSE:            225 gm

LIPIDS:              20 gm

SODIUM CHLORIDE:     100 mEq

SODIUM ACETATE:      -- mEq

SODIUM PHOSPHATE:    - mmol

POTASSIUM CHLORIDE:  40 mEq

POTASSIUM ACETATE:   -- mEq

POTASSIUM PHOSPHATE: - mmol

MAGNESIUM:           20 mEq

CALCIUM:             15 mEq

INSULIN:             35 units

MULTIPLE VITAMIN:    10 ml

TRACE ELEMENTS:      0.5 ml(s)



TPN PLAN:  

Magnesium low; added 5meq magnesium to bag. No other changes, lytes and BG

stable. BMP/mag in AM.





R: Continue TPN as above.

Will monitor electrolytes, glucose, and tolerance to TPN.



 CANDACE BELTRE [facility], 04/20/20 2391

## 2020-04-20 NOTE — NUR
SS following up with discharge planning. SS reviewed pt chart and discussed with pt RN. Pt 
remains on the vent, trach, TPN, IV abx. SS received phone call from [facility] reporting that 
they spoke with pt's daughters today and pt's daughters notified them that they no longer 
want to cooperate with medicaid application process. Pt's daughters still have not gotten 
court order to access pt's finances. Pt has no DPOA and no living will. Pt needing LTAC but 
is self pay pt. SS will continue to follow for discharge planning.

## 2020-04-21 VITALS — DIASTOLIC BLOOD PRESSURE: 56 MMHG | SYSTOLIC BLOOD PRESSURE: 106 MMHG

## 2020-04-21 VITALS — DIASTOLIC BLOOD PRESSURE: 85 MMHG | SYSTOLIC BLOOD PRESSURE: 128 MMHG

## 2020-04-21 VITALS — SYSTOLIC BLOOD PRESSURE: 156 MMHG | DIASTOLIC BLOOD PRESSURE: 85 MMHG

## 2020-04-21 VITALS — SYSTOLIC BLOOD PRESSURE: 119 MMHG | DIASTOLIC BLOOD PRESSURE: 56 MMHG

## 2020-04-21 VITALS — DIASTOLIC BLOOD PRESSURE: 88 MMHG | SYSTOLIC BLOOD PRESSURE: 161 MMHG

## 2020-04-21 VITALS — DIASTOLIC BLOOD PRESSURE: 71 MMHG | SYSTOLIC BLOOD PRESSURE: 148 MMHG

## 2020-04-21 VITALS — DIASTOLIC BLOOD PRESSURE: 84 MMHG | SYSTOLIC BLOOD PRESSURE: 134 MMHG

## 2020-04-21 VITALS — DIASTOLIC BLOOD PRESSURE: 63 MMHG | SYSTOLIC BLOOD PRESSURE: 146 MMHG

## 2020-04-21 VITALS — SYSTOLIC BLOOD PRESSURE: 116 MMHG | DIASTOLIC BLOOD PRESSURE: 60 MMHG

## 2020-04-21 VITALS — DIASTOLIC BLOOD PRESSURE: 46 MMHG | SYSTOLIC BLOOD PRESSURE: 90 MMHG

## 2020-04-21 VITALS — DIASTOLIC BLOOD PRESSURE: 98 MMHG | SYSTOLIC BLOOD PRESSURE: 158 MMHG

## 2020-04-21 VITALS — DIASTOLIC BLOOD PRESSURE: 56 MMHG | SYSTOLIC BLOOD PRESSURE: 84 MMHG

## 2020-04-21 VITALS — SYSTOLIC BLOOD PRESSURE: 143 MMHG | DIASTOLIC BLOOD PRESSURE: 64 MMHG

## 2020-04-21 VITALS — SYSTOLIC BLOOD PRESSURE: 144 MMHG | DIASTOLIC BLOOD PRESSURE: 91 MMHG

## 2020-04-21 VITALS — DIASTOLIC BLOOD PRESSURE: 62 MMHG | SYSTOLIC BLOOD PRESSURE: 113 MMHG

## 2020-04-21 VITALS — DIASTOLIC BLOOD PRESSURE: 93 MMHG | SYSTOLIC BLOOD PRESSURE: 126 MMHG

## 2020-04-21 VITALS — SYSTOLIC BLOOD PRESSURE: 135 MMHG | DIASTOLIC BLOOD PRESSURE: 71 MMHG

## 2020-04-21 VITALS — DIASTOLIC BLOOD PRESSURE: 71 MMHG | SYSTOLIC BLOOD PRESSURE: 133 MMHG

## 2020-04-21 VITALS — DIASTOLIC BLOOD PRESSURE: 64 MMHG | SYSTOLIC BLOOD PRESSURE: 118 MMHG

## 2020-04-21 LAB
% BANDS: 26 % (ref 0–9)
% LYMPHS: 6 % (ref 24–48)
% MONOS: 7 % (ref 0–10)
% SEGS: 61 % (ref 35–66)
ANION GAP SERPL CALC-SCNC: 14 MMOL/L (ref 6–14)
ANISOCYTOSIS BLD QL SMEAR: SLIGHT
BASOPHILS # BLD AUTO: 0.1 X10^3/UL (ref 0–0.2)
BASOPHILS NFR BLD: 1 % (ref 0–3)
BUN SERPL-MCNC: 89 MG/DL (ref 7–20)
CALCIUM SERPL-MCNC: 8.9 MG/DL (ref 8.5–10.1)
CHLORIDE SERPL-SCNC: 105 MMOL/L (ref 98–107)
CO2 SERPL-SCNC: 23 MMOL/L (ref 21–32)
CREAT SERPL-MCNC: 1.6 MG/DL (ref 0.6–1)
EOSINOPHIL NFR BLD: 0 % (ref 0–3)
EOSINOPHIL NFR BLD: 0 X10^3/UL (ref 0–0.7)
ERYTHROCYTE [DISTWIDTH] IN BLOOD BY AUTOMATED COUNT: 18.8 % (ref 11.5–14.5)
GFR SERPLBLD BASED ON 1.73 SQ M-ARVRAT: 34.3 ML/MIN
GLUCOSE SERPL-MCNC: 141 MG/DL (ref 70–99)
HCT VFR BLD CALC: 25.5 % (ref 36–47)
HGB BLD-MCNC: 8.2 G/DL (ref 12–15.5)
HYPOCHROMIA BLD QL SMEAR: SLIGHT
LYMPHOCYTES # BLD: 2.1 X10^3/UL (ref 1–4.8)
LYMPHOCYTES NFR BLD AUTO: 13 % (ref 24–48)
MAGNESIUM SERPL-MCNC: 2.2 MG/DL (ref 1.8–2.4)
MCH RBC QN AUTO: 31 PG (ref 25–35)
MCHC RBC AUTO-ENTMCNC: 32 G/DL (ref 31–37)
MCV RBC AUTO: 95 FL (ref 79–100)
MICROCYTES BLD QL SMEAR: SLIGHT
MONO #: 1.2 X10^3/UL (ref 0–1.1)
MONOCYTES NFR BLD: 8 % (ref 0–9)
NEUT #: 12.8 X10^3/UL (ref 1.8–7.7)
NEUTROPHILS NFR BLD AUTO: 79 % (ref 31–73)
PLATELET # BLD AUTO: 521 X10^3/UL (ref 140–400)
PLATELET # BLD EST: (no result) 10*3/UL
POTASSIUM SERPL-SCNC: 3.7 MMOL/L (ref 3.5–5.1)
RBC # BLD AUTO: 2.68 X10^6/UL (ref 3.5–5.4)
SODIUM SERPL-SCNC: 142 MMOL/L (ref 136–145)
TOXIC GRANULES BLD QL SMEAR: SLIGHT
WBC # BLD AUTO: 16.3 X10^3/UL (ref 4–11)

## 2020-04-21 RX ADMIN — FENTANYL CITRATE PRN MCG: 50 INJECTION INTRAMUSCULAR; INTRAVENOUS at 02:23

## 2020-04-21 RX ADMIN — DEXMEDETOMIDINE HYDROCHLORIDE PRN MLS/HR: 100 INJECTION, SOLUTION, CONCENTRATE INTRAVENOUS at 03:38

## 2020-04-21 RX ADMIN — HEPARIN SODIUM SCH UNIT: 5000 INJECTION, SOLUTION INTRAVENOUS; SUBCUTANEOUS at 21:25

## 2020-04-21 RX ADMIN — MEROPENEM SCH MLS/HR: 500 INJECTION, POWDER, FOR SOLUTION INTRAVENOUS at 12:36

## 2020-04-21 RX ADMIN — DEXMEDETOMIDINE HYDROCHLORIDE PRN MLS/HR: 100 INJECTION, SOLUTION, CONCENTRATE INTRAVENOUS at 16:05

## 2020-04-21 RX ADMIN — DEXMEDETOMIDINE HYDROCHLORIDE PRN MLS/HR: 100 INJECTION, SOLUTION, CONCENTRATE INTRAVENOUS at 12:34

## 2020-04-21 RX ADMIN — INSULIN LISPRO SCH UNITS: 100 INJECTION, SOLUTION INTRAVENOUS; SUBCUTANEOUS at 00:06

## 2020-04-21 RX ADMIN — MEROPENEM SCH MLS/HR: 500 INJECTION, POWDER, FOR SOLUTION INTRAVENOUS at 21:33

## 2020-04-21 RX ADMIN — ACETAMINOPHEN PRN MG: 650 SUPPOSITORY RECTAL at 06:00

## 2020-04-21 RX ADMIN — DEXMEDETOMIDINE HYDROCHLORIDE PRN MLS/HR: 100 INJECTION, SOLUTION, CONCENTRATE INTRAVENOUS at 23:00

## 2020-04-21 RX ADMIN — DEXMEDETOMIDINE HYDROCHLORIDE PRN MLS/HR: 100 INJECTION, SOLUTION, CONCENTRATE INTRAVENOUS at 19:46

## 2020-04-21 RX ADMIN — DEXTROSE SCH MLS/HR: 50 INJECTION, SOLUTION INTRAVENOUS at 12:37

## 2020-04-21 RX ADMIN — FENTANYL CITRATE PRN MCG: 50 INJECTION INTRAMUSCULAR; INTRAVENOUS at 20:12

## 2020-04-21 RX ADMIN — DEXMEDETOMIDINE HYDROCHLORIDE PRN MLS/HR: 100 INJECTION, SOLUTION, CONCENTRATE INTRAVENOUS at 06:49

## 2020-04-21 RX ADMIN — Medication PRN EACH: at 12:44

## 2020-04-21 RX ADMIN — HEPARIN SODIUM SCH UNIT: 5000 INJECTION, SOLUTION INTRAVENOUS; SUBCUTANEOUS at 12:36

## 2020-04-21 RX ADMIN — INSULIN LISPRO SCH UNITS: 100 INJECTION, SOLUTION INTRAVENOUS; SUBCUTANEOUS at 23:34

## 2020-04-21 RX ADMIN — INSULIN LISPRO SCH UNITS: 100 INJECTION, SOLUTION INTRAVENOUS; SUBCUTANEOUS at 18:00

## 2020-04-21 RX ADMIN — INSULIN LISPRO SCH UNITS: 100 INJECTION, SOLUTION INTRAVENOUS; SUBCUTANEOUS at 06:05

## 2020-04-21 RX ADMIN — DEXMEDETOMIDINE HYDROCHLORIDE PRN MLS/HR: 100 INJECTION, SOLUTION, CONCENTRATE INTRAVENOUS at 09:42

## 2020-04-21 RX ADMIN — PANTOPRAZOLE SODIUM SCH MG: 40 INJECTION, POWDER, FOR SOLUTION INTRAVENOUS at 12:35

## 2020-04-21 RX ADMIN — INSULIN LISPRO SCH UNITS: 100 INJECTION, SOLUTION INTRAVENOUS; SUBCUTANEOUS at 12:00

## 2020-04-21 RX ADMIN — ACETAMINOPHEN PRN MG: 650 SUPPOSITORY RECTAL at 16:12

## 2020-04-21 NOTE — PDOC
Subjective:


Subjective:


I asked about pain, she pointed to tracheostomy.





Objective:


Vital Signs:





                                   Vital Signs








  Date Time  Temp Pulse Resp B/P (MAP) Pulse Ox O2 Delivery O2 Flow Rate FiO2


 


4/21/20 08:00  98 18 118/64 (82) 100 Ventilator  


 


4/21/20 07:00 99.9       





 99.9       








Labs:





Laboratory Tests








Test


 4/20/20


12:23 4/20/20


18:27 4/21/20


00:01 4/21/20


05:45


 


Glucose (Fingerstick) 176 mg/dL  145 mg/dL  170 mg/dL  


 


Sodium Level    142 mmol/L 


 


Potassium Level    3.7 mmol/L 


 


Chloride Level    105 mmol/L 


 


Carbon Dioxide Level    23 mmol/L 


 


Anion Gap    14 


 


Blood Urea Nitrogen    89 mg/dL 


 


Creatinine    1.6 mg/dL 


 


Estimated GFR


(Cockcroft-Gault) 


 


 


 34.3 





 


Glucose Level    141 mg/dL 


 


Calcium Level    8.9 mg/dL 


 


Magnesium Level    2.2 mg/dL 


 


Test


 4/21/20


06:04 


 


 





 


Glucose (Fingerstick) 136 mg/dL    











PE:





GEN: dialyzing in ICU


LUNGS: clear/trach/vent


HEART: mildly tachycardic


ABD: softer?


NEURO/PSYCH: awake





A/P:


Gallstone pancreatitis w/ necrosis, MOSF, fever





--


Continue support.





Hemodynamically unstable?:  No


Is patient in severe pain?:  No


Is NPO status required?:  Yes











RAGHAVENDRA MURCIA         Apr 21, 2020 10:59

## 2020-04-21 NOTE — PDOC
PROGRESS NOTES


Assessment


Assessment


Respiratory failure.


Seizure.


Metabolic encephalopathy.


Fever 102.7 degree, recurrent fever noted.


Metabolic acidosis.


Diffuse pulmonary infiltrate.


Pleural effusion.


Pancreatitis, necrotizing.


Gallstone.


Leukocytosis.


Lymphopenia.


Electrolytes imbalances.


Hyperglycemia.


DM.


HTN.


HLD.


Anemia.


Abnormal CXR.


Obesity.


Covid-19 still suspected.





RECOMMENDATIONS/PLAN:


Continue life support in CCU at the present time.


Keppra if has further seizures.


Treat medical diseases.


OT/PT.





EEG: No seizure activity.





OBJECTIVE:


4/21/20: No seizures reported over night.





Past Medical History


Cardiovascular:  HTN, Hyperlipidemia


Endocrine:  Diabetes





Family History


Unobtainable.





Social HistoryU


not obtainable





Allergies


Coded Allergies:  


Codeine (Verified  Allergy, Intermediate, rash, 3/16/20)





ROS


Unobtainable.





NEUROLOGICAL  EXAMINATION:


Eyes open from time to time.


Not oriented to time, place and person.


PERRL.


EOMI.


CN: no acute focal findings.


Muscle tone: Decreased.


Muscle strength: 3 UE, 2+ LE.


DTR: 1-2.


Plantar reflex: Neutral response bilaterally 


Gait: not able to walk.


Sensory exam: no response to stimuli..


Not able to access cerebellar signs.


F-T-N test not performed. 


Patient examined in CCU.





Objective


Objective





Vital Signs








  Date Time  Temp Pulse Resp B/P (MAP) Pulse Ox O2 Delivery O2 Flow Rate FiO2


 


4/21/20 12:53      Mechanical Ventilator  


 


4/21/20 12:51 100.1 106 18 90/46 (61) 100   





 100.1       














Intake and Output 


 


 4/21/20





 07:00


 


Intake Total 3489.1 ml


 


Output Total 3005 ml


 


Balance 484.1 ml


 


 


 


IV Total 3489.1 ml


 


Output Urine Total 2805 ml


 


Gastric Drainage Total 200 ml











Vitals Signs


Vitals





                          VS - Last 72 Hours, by Label








  Date Time  Temp Pulse Resp B/P (MAP) Pulse Ox O2 Delivery O2 Flow Rate FiO2


 


4/21/20 12:53      Mechanical Ventilator  


 


4/21/20 12:51 100.1 106 18 90/46 (61) 100 Ventilator  





 100.1       


 


4/21/20 12:30  103 18 146/63 (90) 100 Ventilator  


 


4/21/20 12:30     99 Ventilator  


 


4/21/20 08:00  98 18 118/64 (82) 100 Ventilator  


 


4/21/20 08:00      Mechanical Ventilator  


 


4/21/20 07:42     100 Ventilator  


 


4/21/20 07:00 99.9 116 18 133/71 (91) 100 Ventilator  





 99.9       


 


4/21/20 06:00 102.2 124 28 158/98 (118) 100 Ventilator  





 102.2       


 


4/21/20 05:00  97 20 113/62 (79) 100 Ventilator  


 


4/21/20 04:00      Mechanical Ventilator  


 


4/21/20 04:00  116 24 128/85 (99) 100 Ventilator  


 


4/21/20 03:55     100 Ventilator  


 


4/21/20 03:00 100.8 88 20 106/56 (73) 98 Ventilator  





 100.8       


 


4/21/20 02:50   20  97 Ventilator  


 


4/21/20 02:23   24  100 Ventilator  


 


4/21/20 02:00  112 26 156/85 (108) 100 Ventilator  


 


4/21/20 01:00  99 20 134/84 (101) 100 Ventilator  


 


4/21/20 00:25     100 Ventilator  


 


4/20/20 23:59 99.5 89 20 117/58 (77) 100 Ventilator  





 99.5       


 


4/20/20 23:59      Mechanical Ventilator  


 


4/20/20 23:00  100 24 152/86 (108) 100 Ventilator  


 


4/20/20 22:00 102.1 106 20 147/82 (103) 100 Ventilator  





 102.1       


 


4/20/20 21:55   20  100 Ventilator  


 


4/20/20 21:27   24  100 Ventilator  


 


4/20/20 21:00  112 24 148/82 (104) 100 Ventilator  


 


4/20/20 20:46     100 Ventilator  


 


4/20/20 20:00 101.2 115 22 132/78 (96) 100 Ventilator  





 101.2       


 


4/20/20 20:00      Mechanical Ventilator  


 


4/20/20 19:00  114 24 156/71 (99) 100 Ventilator  


 


4/20/20 18:12     100 Ventilator  


 


4/20/20 18:00  119 27 154/81 (105) 100 Ventilator  


 


4/20/20 17:00  118 26 153/74 (100) 100 Ventilator  


 


4/20/20 16:00      Mechanical Ventilator  


 


4/20/20 16:00 100.5 105 20 156/84 (108) 100 Ventilator  





 100.5       


 


4/20/20 15:42     100 Ventilator  


 


4/20/20 15:00  95 23 111/61 (78) 100 Ventilator  


 


4/20/20 14:00  99 17 144/81 (102) 100 Ventilator  


 


4/20/20 13:00  116 22 141/70 (93) 100 Ventilator  


 


4/20/20 12:00 101.2 107 29 128/82 (97) 100 Ventilator  





 101.2       


 


4/20/20 12:00     100 Ventilator  


 


4/20/20 12:00      Mechanical Ventilator  


 


4/20/20 11:00  116 25 142/82 (102) 100 Ventilator  


 


4/20/20 10:00  105 22 166/81 (109) 100 Ventilator  


 


4/20/20 09:00  115 25 149/86 (107) 98 Ventilator  


 


4/20/20 08:03     100 Ventilator  


 


4/20/20 08:00 101.2 113 24 117/76 (90) 100 Ventilator  





 101.2       


 


4/20/20 08:00      Mechanical Ventilator  


 


4/20/20 07:00  106 20 127/60 (82) 99 Ventilator  











Laboratory


Laboratory





Laboratory Tests








Test


 4/20/20


18:27 4/21/20


00:01 4/21/20


05:45 4/21/20


06:04


 


Glucose (Fingerstick)


 145 mg/dL


(70-99) 170 mg/dL


(70-99) 


 136 mg/dL


(70-99)


 


White Blood Count


 


 


 16.3 x10^3/uL


(4.0-11.0) 





 


Red Blood Count


 


 


 2.68 x10^6/uL


(3.50-5.40) 





 


Hemoglobin


 


 


 8.2 g/dL


(12.0-15.5) 





 


Hematocrit


 


 


 25.5 %


(36.0-47.0) 





 


Mean Corpuscular Volume   95 fL ()  


 


Mean Corpuscular Hemoglobin   31 pg (25-35)  


 


Mean Corpuscular Hemoglobin


Concent 


 


 32 g/dL


(31-37) 





 


Red Cell Distribution Width


 


 


 18.8 %


(11.5-14.5) 





 


Platelet Count


 


 


 521 x10^3/uL


(140-400) 





 


Neutrophils (%) (Auto)   79 % (31-73)  


 


Lymphocytes (%) (Auto)   13 % (24-48)  


 


Monocytes (%) (Auto)   8 % (0-9)  


 


Eosinophils (%) (Auto)   0 % (0-3)  


 


Basophils (%) (Auto)   1 % (0-3)  


 


Neutrophils # (Auto)


 


 


 12.8 x10^3/uL


(1.8-7.7) 





 


Lymphocytes # (Auto)


 


 


 2.1 x10^3/uL


(1.0-4.8) 





 


Monocytes # (Auto)


 


 


 1.2 x10^3/uL


(0.0-1.1) 





 


Eosinophils # (Auto)


 


 


 0.0 x10^3/uL


(0.0-0.7) 





 


Basophils # (Auto)


 


 


 0.1 x10^3/uL


(0.0-0.2) 





 


Segmented Neutrophils %   61 % (35-66)  


 


Band Neutrophils %   26 % (0-9)  


 


Lymphocytes %   6 % (24-48)  


 


Monocytes %   7 % (0-10)  


 


Toxic Granulation   Slight  


 


Platelet Estimate


 


 


 Increased


(ADEQUATE) 





 


Hypochromasia   Slight  


 


Anisocytosis   Slight  


 


Microcytosis   Slight  


 


Sodium Level


 


 


 142 mmol/L


(136-145) 





 


Potassium Level


 


 


 3.7 mmol/L


(3.5-5.1) 





 


Chloride Level


 


 


 105 mmol/L


() 





 


Carbon Dioxide Level


 


 


 23 mmol/L


(21-32) 





 


Anion Gap   14 (6-14)  


 


Blood Urea Nitrogen


 


 


 89 mg/dL


(7-20) 





 


Creatinine


 


 


 1.6 mg/dL


(0.6-1.0) 





 


Estimated GFR


(Cockcroft-Gault) 


 


 34.3 


 





 


Glucose Level


 


 


 141 mg/dL


(70-99) 





 


Calcium Level


 


 


 8.9 mg/dL


(8.5-10.1) 





 


Magnesium Level


 


 


 2.2 mg/dL


(1.8-2.4) 





 


Test


 4/21/20


12:48 


 


 





 


Glucose (Fingerstick)


 157 mg/dL


(70-99) 


 


 











Microbiology


4/15/20 Aerobic and Anaerobic Culture - Final, Complete


          


4/15/20 Anaerobic Culture Result 1 (ANSON) - Final, Complete


          


4/15/20 Aerobic Culture - Final, Complete


          


4/15/20 Aerobic Culture Result 1 (ANSON) - Final, Complete


          


4/15/20 Gram Stain - Final, Complete


          


4/15/20 Gram Stain Result 1 (ANSON) - Final, Complete


          


4/15/20 Gram Stain Result 2 (ANSON) - Final, Complete


          


4/14/20 Blood Culture - Final, Complete


          NO GROWTH AFTER 5 DAYS


4/12/20 Urine Culture - Final, Complete


          


4/12/20 Urine Culture Result 1 (ANSON) - Final, Complete





Medication


Medications





Current Medications


Albumin Human 200 ml @  200 mls/hr 1X PRN  PRN IV Hypotension Last administered 

on 4/21/20at 08:40;  Start 4/21/20 at 08:00;  Stop 4/21/20 at 13:59


Amino Acids/ Glycerin/ Electrolytes 1,000 ml @  75 mls/hr [id number] IV ;  Start 

4/20/20 at 21:15;  Status UNV


Daptomycin 430 mg/ Sodium Chloride 50 ml @  100 mls/hr Q24H IV  Last 

administered on 4/21/20at 12:35;  Start 4/21/20 at 09:00;  Stop 4/21/20 at 

12:49;  Status DC


Daptomycin 430 mg/ Sodium Chloride 50 ml @  100 mls/hr Q48H IV ;  Start 4/23/20 

at 09:00


Enoxaparin Sodium (Lovenox 100mg Syringe) 100 mg Q12HR SQ ;  Start 4/21/20 at 

21:00;  Status UNV


Fentanyl Citrate (Fentanyl 2ml Vial) 25 mcg PRN Q2HR  PRN IVP PAIN;  Start 

4/20/20 at 21:00


Fentanyl Citrate (Fentanyl 2ml Vial) 50 mcg PRN Q2HR  PRN IVP PAIN Last 

administered on 4/21/20at 02:23;  Start 4/20/20 at 21:00


Info (PHARMACY MONITORING -- do not chart) 1 each PRN DAILY  PRN MC SEE 

COMMENTS;  Start 4/21/20 at 08:00;  Status UNV


Info (PHARMACY MONITORING -- do not chart) 1 each PRN DAILY  PRN MC SEE 

COMMENTS;  Start 4/21/20 at 08:00;  Status UNV


Sodium Chloride 1,000 ml @  400 mls/hr Q2H30M PRN IV PATENCY;  Start 4/21/20 at 

07:56;  Stop 4/21/20 at 19:55


Sodium Chloride 1,000 ml @  1,000 mls/hr Q1H PRN IV hypotension;  Start 4/21/20 

at 07:56;  Stop 4/21/20 at 13:55


Sodium Chloride 100 meq/Potassium Chloride 40 meq/ Magnesium Sulfate 20 

meq/Calcium Gluconate 15 meq/ Multivitamins 10 ml/Chromium/ Copper/Manganese/ 

Seleni/Zn 0.5 ml/ Insulin Human Regular 35 unit/ Total Parenteral 

Nutrition/Amino Acids/Dextrose/ Fat Emulsion Intravenous 1,400 ml @  58.333 mls/

hr TPN  CONT IV  Last administered on 4/20/20at 22:31;  Start 4/20/20 at 22:00; 

Stop 4/21/20 at 21:59


Sodium Chloride 100 meq/Potassium Chloride 40 meq/ Magnesium Sulfate 20 

meq/Calcium Gluconate 15 meq/ Multivitamins 10 ml/Chromium/ Copper/Manganese/ 

Seleni/Zn 0.5 ml/ Insulin Human Regular 35 unit/ Total Parenteral 

Nutrition/Amino Acids/Dextrose/ Fat Emulsion Intravenous 1,400 ml @  58.333 mls/

hr TPN  CONT IV ;  Start 4/21/20 at 22:00;  Stop 4/22/20 at 21:59





Comment


Review of Relevant


I have reviewed the following items josy (where applicable) has been applied.











DORYS LANDA MD                 Apr 21, 2020 13:35

## 2020-04-21 NOTE — PDOC
Infectious Disease Note


Subjective


Subjective


Not feeling well, abd pain present


Remains on vent via trach, FiO2 35 % PEEP 5 SpO2 100%


TPN 


cont to be febrile





ROS


ROS


no n/v/d





Vital Sign


Vital Signs





Vital Signs








  Date Time  Temp Pulse Resp B/P (MAP) Pulse Ox O2 Delivery O2 Flow Rate FiO2


 


4/21/20 07:42     100 Ventilator  


 


4/21/20 06:00 102.2 124 28 158/98 (118)    





 102.2       











Physical Exam


PHYSICAL EXAM


GENERAL: Propped up in bed, resting quietly, arouses to voice


HEENT: Pupils equal, + NGT, oral cavity dry 


NECK:  Trach/vent 


LUNGS: rhonchi 


HEART:  S1, S2, regular 


ABDOMEN: Distended, tender, hypoactive BS, 


: [place] (4/14)


EXTREMITIES: Generalized edema, no cyanosis, SCDs bilaterally 


DERMATOLOGIC:  Warm and dry.  No generalized rash.  


CENTRAL NERVOUS SYSTEM: Nods appropriately to few questions, 


HDC & LIJ (4/14) clean





Labs


Lab





Laboratory Tests








Test


 4/20/20


12:23 4/20/20


18:27 4/21/20


00:01 4/21/20


05:45


 


Glucose (Fingerstick)


 176 mg/dL


(70-99) 145 mg/dL


(70-99) 170 mg/dL


(70-99) 





 


Sodium Level


 


 


 


 142 mmol/L


(136-145)


 


Potassium Level


 


 


 


 3.7 mmol/L


(3.5-5.1)


 


Chloride Level


 


 


 


 105 mmol/L


()


 


Carbon Dioxide Level


 


 


 


 23 mmol/L


(21-32)


 


Anion Gap    14 (6-14) 


 


Blood Urea Nitrogen


 


 


 


 89 mg/dL


(7-20)


 


Creatinine


 


 


 


 1.6 mg/dL


(0.6-1.0)


 


Estimated GFR


(Cockcroft-Gault) 


 


 


 34.3 





 


Glucose Level


 


 


 


 141 mg/dL


(70-99)


 


Calcium Level


 


 


 


 8.9 mg/dL


(8.5-10.1)


 


Magnesium Level


 


 


 


 2.2 mg/dL


(1.8-2.4)


 


Test


 4/21/20


06:04 


 


 





 


Glucose (Fingerstick)


 136 mg/dL


(70-99) 


 


 











Micro


BC neg


Abd culture neg, cell count not done





Objective


Assessment


Leukocytosis 4/10 -improved 


Fever


   - New left IJ/[place] 4/14. PICC removed 4/14


   -? pancreatitis. blood cults 4/10 neg. Urine - neg, 


Ascites s/p paracentesis 4/15 - 4300 ml. cultures neg to date 


Severe acute gallstone pancreatitis with necrosis


   -CT 4/14 necrotizing pancreatitis with fluid and phlegmon at the pancreas 


Hypotension off levaphed 


Julian Pleural effusions


JED requiring HD 


   - s/p RIJ temporary dialysis catheter replacement, 4/2


Vent dependent respiratory failure


   -s/p Trach placement 


   -lung opacities, COVID-19 neg 


Anasarca - worse


Anemia - S/p PRBC 3/26


Hypocalcemia 


Prediabetes


HTN


Diarrhea, C. diff neg 3/23


Anemia - S/p PRBCs





Plan


Plan of Care


cont merrem (4/8) and ,micafungin,  and dapto





Gen surgery following


Maintain aspiration precautions 


Anemia deferred to primary


CBC in am





Critically ill





zyvox changed to dapto , to rule out serotonin sy causing fever,


I think sec to cont fever, and broad coverage, need to consider pancreatic n

ecrosectomy





D/w nursing











LINN FRANZ MD               Apr 21, 2020 08:09

## 2020-04-21 NOTE — NUR
SS following up with discharge planning. SS reviewed pt chart and discussed with pt RN. Pt 
remains on the vent, TPN, and IV abx at this time. No new changes. Pt requires LTAC but is 
self pay pt. [facility] attempting to work with family to obtain financial documents for Medicaid 
application. SS will continue to follow for discharge planning.

## 2020-04-21 NOTE — PDOC
SUBJECTIVE


ROS


stable , awake, Nods





OBJECTIVE


Vital Signs





Vital Signs








  Date Time  Temp Pulse Resp B/P (MAP) Pulse Ox O2 Delivery O2 Flow Rate FiO2


 


4/21/20 07:42     100 Ventilator  


 


4/21/20 06:00 102.2 124 28 158/98 (118)    





 102.2       








I & 0











Intake and Output 


 


 4/21/20





 07:00


 


Intake Total 3489.1 ml


 


Output Total 2855 ml


 


Balance 634.1 ml


 


 


 


IV Total 3489.1 ml


 


Output Urine Total 2655 ml


 


Gastric Drainage Total 200 ml











PHYSICAL EXAM


Physical Exam


GENERAL: arouses to voice


HEENT: + NGT, 


NECK:  Trach/vent 


LUNGS: rhonchi 


HEART:  S1, S2, regular 


ABDOMEN: Distended, tender, hypoactive BS, 


: [place] (4/14)


EXTREMITIES: Generalized edema, no cyanosis, 


DERMATOLOGIC:   No generalized rash.  


CENTRAL NERVOUS SYSTEM: Nods appropriately to few questions, 


HDC  (4/14)





DIAGNOSIS/ASSESSMENT


Assessment & Plan





ARF-- ATN,TTS schedule  


seen on HD , tolerating well, discussed and reviewed treatment plan with Robert   


UOP improved, Cr stable, BUN elevated ,Re-eval on Thursday for HD ,  Monitor for

renal recovery  


 


Anemia-  Currently on YOLI 





Anasarca due to 3rd spacing  stable  





Hyperkalemia- resolved  





AC Pancreatitis, early developing necrosis


No intervention per GS  


CT SCAN 4/14 -  Diffuse pancreatic necrosi,extensive retroperitoneal fluid and 

inflammation, Cholelithiasis.


    Moderate to large volume ascites with little change.


 





Ascites - post paracentesis on 4/15-  4300 cc of thin, light brown fluid was 

removed





  Cholelithiasis with thickening of the gallbladder wall.





Acute hypoxic resp failure ,bilateral pleural effusion


Has Trach, On Vent  


 


 Persistent pleural effusions and atelectasis in the lung bases.





hypocalcemia - stable 





HTN- Hypotensive , pressors as indicated





COVID-19 neg, 3/26





COMMENT/RELEVANT DATA


Meds





Current Medications








 Medications


  (Trade)  Dose


 Ordered  Sig/Yee  Start Time


 Stop Time Status Last Admin


Dose Admin


 


 Acetaminophen


  (Tylenol Supp)  650 mg  PRN Q6HRS  PRN  3/24/20 10:30


    4/21/20 06:00


650 MG


 


 Acetaminophen


  (Tylenol)  650 mg  PRN Q6HRS  PRN  3/21/20 03:36


    4/16/20 19:56


650 MG


 


 Albumin Human  200 ml @ 


 200 mls/hr  1X PRN  PRN  4/21/20 08:00


 4/21/20 13:59  4/21/20 08:40


200 MLS/HR


 


 Albuterol Sulfate


  (Ventolin Neb


 Soln)  2.5 mg  1X  ONCE  3/17/20 22:30


 3/17/20 22:31 DC 3/18/20 00:56


2.5 MG


 


 Alteplase,


 Recombinant


  (Cathflo For


 Central Catheter


 Clearance)  1 mg  1X  ONCE  3/31/20 22:00


 3/31/20 22:01 DC 3/31/20 22:05


1 MG


 


 Amino Acids/


 Glycerin/


 Electrolytes  1,000 ml @ 


 75 mls/hr  [id number]  4/20/20 21:15


   UNV  





 


 Artificial Tears


  (Artificial


 Tears)  1 drop  PRN Q1HR  PRN  3/23/20 08:15


     





 


 Atenolol


  (Tenormin)  100 mg  DAILY  3/17/20 09:00


 3/16/20 20:08 DC  





 


 Atropine Sulfate


  (ATROPINE 0.5mg


 SYRINGE)  0.5 mg  PRN Q5MIN  PRN  4/2/20 08:15


     





 


 Benzocaine


  (Hurricaine One)  1 spray  1X  ONCE  3/20/20 14:30


 3/20/20 14:31 DC 3/20/20 16:38


1 SPRAY


 


 Bupivacaine HCl/


 Epinephrine Bitart


  (Sensorcain-Epi


 0.5%-1:378806 Mpf)  30 ml  STK-MED ONCE  4/6/20 11:00


 4/6/20 11:01 DC 4/6/20 11:44


1 ML


 


 Calcium Carbonate/


 Glycine


  (Tums)  500 mg  PRN [id number]  PRN  3/18/20 17:45


     





 


 Calcium Chloride


 1000 mg/Sodium


 Chloride  110 ml @ 


 220 mls/hr  1X  ONCE  3/17/20 22:30


 3/17/20 22:59 DC 3/17/20 22:11


220 MLS/HR


 


 Calcium Chloride


 3000 mg/Sodium


 Chloride  1,030 ml @ 


 50 mls/hr  [id number]  3/19/20 08:00


 3/21/20 15:23 DC 3/21/20 02:17


50 MLS/HR


 


 Calcium Gluconate


  (Calcium


 Gluconate)  2,000 mg  1X  ONCE  3/19/20 02:15


 3/19/20 02:16 DC 3/19/20 02:19


2,000 MG


 


 Calcium Gluconate


 1000 mg/Sodium


 Chloride  110 ml @ 


 220 mls/hr  1X  ONCE  3/18/20 03:30


 3/18/20 03:59 DC 3/18/20 03:21


220 MLS/HR


 


 Calcium Gluconate


 2000 mg/Sodium


 Chloride  120 ml @ 


 220 mls/hr  1X  ONCE  3/18/20 07:30


 3/18/20 08:02 DC 3/18/20 09:05


220 MLS/HR


 


 Cefepime HCl


  (Maxipime)  2 gm  Q12HR  3/25/20 09:00


 4/8/20 09:58 DC 4/7/20 20:56


2 GM


 


 Cellulose


  (Surgicel


 Fibrillar 1x2)  1 each  STK-MED ONCE  4/6/20 11:00


 4/6/20 11:01 DC  





 


 Cellulose


  (Surgicel


 Hemostat 4x8)  1 each  STK-MED ONCE  4/6/20 11:55


 4/6/20 11:56 DC 4/6/20 11:44


1 EACH


 


 Daptomycin 430 mg/


 Sodium Chloride  50 ml @ 


 100 mls/hr  Q24H  4/21/20 09:00


     





 


 Daptomycin 500 mg/


 Sodium Chloride  50 ml @ 


 100 mls/hr  Q48H  3/25/20 08:30


 4/10/20 10:07 DC 4/10/20 09:57


100 MLS/HR


 


 Dexmedetomidine


 HCl 400 mcg/


 Sodium Chloride  100 ml @ 0


 mls/hr  CONT  PRN  4/2/20 08:15


    4/21/20 06:49


30.5 MLS/HR


 


 Dextrose


  (Dextrose


 50%-Water Syringe)  12.5 gm  PRN Q15MIN  PRN  3/16/20 09:30


     





 


 Digoxin


  (Lanoxin)  125 mcg  1X  ONCE  3/19/20 18:00


 3/19/20 18:01 DC 3/19/20 17:10


125 MCG


 


 Diphenhydramine


 HCl


  (Benadryl)  25 mg  1X PRN  PRN  4/13/20 09:45


 4/14/20 09:44 DC  





 


 Enoxaparin Sodium


  (Lovenox 100mg


 Syringe)  100 mg  Q12HR  4/21/20 21:00


   UNV  





 


 Etomidate


  (Amidate)  8 mg  1X  ONCE  3/23/20 08:30


 3/23/20 08:31 DC 3/23/20 08:33


8 MG


 


 Fentanyl Citrate


  (Fentanyl 2ml


 Vial)  25 mcg  PRN Q2HR  PRN  4/20/20 21:00


     





 


 Furosemide


  (Lasix)  40 mg  1X  ONCE  4/13/20 14:30


 4/13/20 14:31 DC 4/13/20 14:39


40 MG


 


 Heparin Sodium


  (Porcine)


  (Hep Lock Adult)  500 unit  STK-MED ONCE  4/7/20 09:29


 4/7/20 09:30 DC  





 


 Heparin Sodium


  (Porcine)


  (Heparin Sodium)  5,000 unit  Q12HR  4/3/20 21:00


    4/20/20 21:30


5,000 UNIT


 


 Hydromorphone HCl


  (Dilaudid)  1 mg  PRN Q3HRS  PRN  3/17/20 12:00


 3/31/20 00:25 DC 3/23/20 05:13


1 MG


 


 Info


  (CONTRAST GIVEN


 -- Rx MONITORING)  1 each  PRN DAILY  PRN  3/30/20 11:45


 4/1/20 11:44 DC  





 


 Info


  (Icu Electrolyte


 Protocol)  1 ea  CONT PRN  PRN  3/29/20 13:15


     





 


 Info


  (PHARMACY


 MONITORING -- do


 not chart)  1 each  PRN DAILY  PRN  4/21/20 08:00


   UNV  





 


 Info


  (Tpn Per


 Pharmacy)  1 each  PRN DAILY  PRN  3/18/20 12:30


   UNV  





 


 Insulin Human


 Lispro


  (HumaLOG)  0-9 UNITS  Q6HRS  3/16/20 09:30


    4/21/20 00:06


4 UNITS


 


 Insulin Human


 Regular


  (HumuLIN R VIAL)  5 unit  1X  ONCE  3/17/20 22:30


 3/17/20 22:31 DC 3/17/20 22:14


5 UNIT


 


 Iohexol


  (Omnipaque 240


 Mg/ml)  30 ml  1X  ONCE  3/30/20 11:30


 3/30/20 11:33 DC 3/30/20 11:30


30 ML


 


 Iohexol


  (Omnipaque 300


 Mg/ml)  60 ml  1X  ONCE  3/17/20 17:00


 3/17/20 17:01 DC 3/17/20 17:20


60 ML


 


 Iohexol


  (Omnipaque 350


 Mg/ml)  90 ml  1X  ONCE  3/16/20 03:30


 3/16/20 03:31 DC 3/16/20 03:25


90 ML


 


 Ketorolac


 Tromethamine


  (Toradol 30mg


 Vial)  30 mg  1X  ONCE  3/16/20 03:00


 3/16/20 03:01 DC 3/16/20 02:54


30 MG


 


 Lidocaine HCl


  (Buffered


 Lidocaine 1%)  6 ml  1X  ONCE  4/15/20 13:30


 4/15/20 13:31 DC 4/15/20 13:45


9 ML


 


 Lidocaine HCl


  (Glydo


  (Lidocaine) Jelly)  1 thomas  1X  ONCE  3/20/20 14:30


 3/20/20 14:31 DC 3/20/20 16:38


1 THOMAS


 


 Lidocaine HCl


  (Xylocaine-Mpf


 1% 2ml Vial)  2 ml  PRN 1X  PRN  4/6/20 07:00


 4/7/20 06:59 DC  





 


 Linezolid/Dextrose  300 ml @ 


 300 mls/hr  Q12HR  4/10/20 11:00


 4/21/20 08:10 DC 4/20/20 20:40


300 MLS/HR


 


 Lorazepam


  (Ativan Inj)  1 mg  PRN Q4HRS  PRN  3/19/20 09:00


 4/17/20 09:19 DC 4/17/20 03:51


1 MG


 


 Magnesium Sulfate  50 ml @ 25


 mls/hr  PRN DAILY  PRN  4/5/20 09:15


    4/20/20 17:27


25 MLS/HR


 


 Meropenem 1 gm/


 Sodium Chloride  100 ml @ 


 200 mls/hr  Q8HRS  3/17/20 20:00


 3/18/20 08:48 DC 3/18/20 05:45


200 MLS/HR


 


 Meropenem 500 mg/


 Sodium Chloride  50 ml @ 


 100 mls/hr  Q12H  4/8/20 10:00


    4/20/20 22:17


100 MLS/HR


 


 Metoprolol


 Tartrate


  (Lopressor Vial)  5 mg  Q6HRS  3/17/20 10:15


 3/28/20 08:48 DC 3/26/20 00:12


5 MG


 


 Metronidazole  100 ml @ 


 100 mls/hr  Q8HRS  4/14/20 10:00


 4/21/20 08:10 DC 4/21/20 06:04


100 MLS/HR


 


 Micafungin Sodium


 100 mg/Dextrose  100 ml @ 


 100 mls/hr  Q24H  3/23/20 09:00


    4/20/20 09:16


100 MLS/HR


 


 Midazolam HCl


  (Versed)  5 mg  1X  ONCE  3/23/20 08:30


 3/23/20 08:31 DC  





 


 Midazolam HCl 100


 mg/Sodium Chloride  100 ml @ 7


 mls/hr  CONT  PRN  3/28/20 16:00


    4/8/20 15:35


7 MLS/HR


 


 Midazolam HCl 50


 mg/Sodium Chloride  50 ml @ 0


 mls/hr  CONT  PRN  3/23/20 08:15


 3/28/20 15:59 DC 3/26/20 22:39


7 MLS/HR


 


 Morphine Sulfate


  (Morphine


 Sulfate)  2 mg  PRN Q2HR  PRN  3/16/20 05:00


 3/17/20 14:15 DC 3/17/20 12:26


2 MG


 


 Multi-Ingred


 Cream/Lotion/Oil/


 Oint


  (Artificial


 Tears Eye


 Ointment)  1 thomas  PRN Q1HR  PRN  3/25/20 17:30


    4/13/20 08:19


1 THOMAS


 


 Norepinephrine


 Bitartrate 8 mg/


 Dextrose  258 ml @ 


 17.299 mls/


 hr  CONT  PRN  3/17/20 15:30


 4/17/20 09:19 DC 4/14/20 12:48


20.9 MLS/HR


 


 Ondansetron HCl


  (Zofran)  4 mg  PRN Q6HRS  PRN  4/6/20 07:00


 4/7/20 06:59 DC  





 


 Pantoprazole


 Sodium


  (PROTONIX VIAL


 for IV PUSH)  40 mg  DAILYAC  3/16/20 11:30


    4/20/20 09:16


40 MG


 


 Piperacillin Sod/


 Tazobactam Sod


 4.5 gm/Sodium


 Chloride  100 ml @ 


 200 mls/hr  1X  ONCE  3/16/20 06:00


 3/16/20 06:29 DC 3/16/20 05:44


200 MLS/HR


 


 Potassium


 Chloride 15 meq/


 Bicarbonate


 Dialysis Soln w/


 out KCl  5,007.5 ml


  @ 1,000 mls/


 hr  Q5H1M  3/29/20 20:00


 4/2/20 13:08 DC 4/1/20 18:14


1,000 MLS/HR


 


 Potassium


 Chloride 20 meq/


 Bicarbonate


 Dialysis Soln w/


 out KCl  5,010 ml @ 


 1,000 mls/hr  Q5H1M  3/25/20 16:00


 3/29/20 19:59 DC 3/29/20 14:54


1,000 MLS/HR


 


 Potassium


 Chloride/Water  100 ml @ 


 100 mls/hr  1X  ONCE  4/18/20 14:45


 4/18/20 15:44 DC 4/18/20 17:28


100 MLS/HR


 


 Potassium


 Phosphate 20 mmol/


 Sodium Chloride  106.6667


 ml @ 


 51.667 m...  1X  ONCE  3/25/20 13:00


 3/25/20 15:03 DC 3/25/20 12:51


51.667 MLS/HR


 


 Prochlorperazine


 Edisylate


  (Compazine)  5 mg  PACU PRN  PRN  4/6/20 07:00


 4/7/20 06:59 DC  





 


 Propofol  20 ml @ As


 Directed  STK-MED ONCE  4/6/20 11:07


 4/6/20 11:07 DC  





 


 Ringer's Solution  1,000 ml @ 


 30 mls/hr  Q24H  4/6/20 07:00


 4/6/20 18:59 DC  





 


 Sevoflurane


  (Ultane)  60 ml  STK-MED ONCE  4/6/20 12:46


 4/6/20 12:46 DC  





 


 Sodium


 Bicarbonate 50


 meq/Sodium


 Chloride  1,050 ml @ 


 75 mls/hr  Q14H  3/18/20 07:30


 3/23/20 10:28 DC 3/22/20 21:10


75 MLS/HR


 


 Sodium Chloride  1,000 ml @ 


 400 mls/hr  Q2H30M PRN  4/21/20 07:56


 4/21/20 19:55   





 


 Sodium Chloride


  (Normal Saline


 Flush)  20 ml  PRN Q5MIN  PRN  4/14/20 15:00


     





 


 Sodium Chloride


 90 meq/Calcium


 Gluconate 10 meq/


 Multivitamins 10


 ml/Chromium/


 Copper/Manganese/


 Seleni/Zn 0.5 ml/


 Total Parenteral


 Nutrition/Amino


 Acids/Dextrose/


 Fat Emulsion


 Intravenous  1,512 ml @ 


 63 mls/hr  TPN  CONT  3/18/20 22:00


 3/19/20 21:59 DC 3/18/20 22:06


63 MLS/HR


 


 Sodium Chloride


 90 meq/Calcium


 Gluconate 10 meq/


 Multivitamins 10


 ml/Chromium/


 Copper/Manganese/


 Seleni/Zn 1 ml/


 Total Parenteral


 Nutrition/Amino


 Acids/Dextrose/


 Fat Emulsion


 Intravenous  55.005 ml


  @ 2.292


 mls/hr  TPN  CONT  3/18/20 22:00


 3/18/20 12:33 DC  





 


 Sodium Chloride


 90 meq/Magnesium


 Sulfate 10 meq/


 Calcium Gluconate


 20 meq/


 Multivitamins 10


 ml/Chromium/


 Copper/Manganese/


 Seleni/Zn 0.5 ml/


 Total Parenteral


 Nutrition/Amino


 Acids/Dextrose/


 Fat Emulsion


 Intravenous  1,512 ml @ 


 63 mls/hr  TPN  CONT  3/19/20 22:00


 3/20/20 21:59 DC 3/19/20 22:25


63 MLS/HR


 


 Sodium Chloride


 90 meq/Magnesium


 Sulfate 12 meq/


 Calcium Gluconate


 15 meq/


 Multivitamins 10


 ml/Chromium/


 Copper/Manganese/


 Seleni/Zn 0.5 ml/


 Insulin Human


 Regular 25 unit/


 Total Parenteral


 Nutrition/Amino


 Acids/Dextrose/


 Fat Emulsion


 Intravenous  1,400 ml @ 


 58.333 mls/


 hr  TPN  CONT  4/8/20 22:00


 4/9/20 21:59 DC 4/8/20 21:41


58.333 MLS/HR


 


 Sodium Chloride


 90 meq/Potassium


 Chloride 15 meq/


 Magnesium Sulfate


 12 meq/Calcium


 Gluconate 15 meq/


 Multivitamins 10


 ml/Chromium/


 Copper/Manganese/


 Seleni/Zn 0.5 ml/


 Insulin Human


 Regular 25 unit/


 Total Parenteral


 Nutrition/Amino


 Acids/Dextrose/


 Fat Emulsion


 Intravenous  1,400 ml @ 


 58.333 mls/


 hr  TPN  CONT  4/7/20 22:00


 4/8/20 21:59 DC 4/7/20 22:13


58.333 MLS/HR


 


 Sodium Chloride


 90 meq/Potassium


 Chloride 15 meq/


 Potassium


 Phosphate 10 mmol/


 Magnesium Sulfate


 8 meq/Calcium


 Gluconate 15 meq/


 Multivitamins 10


 ml/Chromium/


 Copper/Manganese/


 Seleni/Zn 0.5 ml/


 Insulin Human


 Regular 25 unit/


 Total Parenteral


 Nutrition/Amino


 Acids/Dextrose/


 Fat Emulsion


 Intravenous  1,400 ml @ 


 58.333 mls/


 hr  TPN  CONT  4/5/20 22:00


 4/6/20 21:59 DC 4/5/20 21:20


58.333 MLS/HR


 


 Sodium Chloride


 90 meq/Potassium


 Chloride 15 meq/


 Potassium


 Phosphate 10 mmol/


 Magnesium Sulfate


 10 meq/Calcium


 Gluconate 20 meq/


 Multivitamins 10


 ml/Chromium/


 Copper/Manganese/


 Seleni/Zn 0.5 ml/


 Total Parenteral


 Nutrition/Amino


 Acids/Dextrose/


 Fat Emulsion


 Intravenous  1,400 ml @ 


 58.333 mls/


 hr  TPN  CONT  3/23/20 22:00


 3/24/20 21:59 DC 3/23/20 21:42


58.333 MLS/HR


 


 Sodium Chloride


 90 meq/Potassium


 Chloride 15 meq/


 Potassium


 Phosphate 10 mmol/


 Magnesium Sulfate


 12 meq/Calcium


 Gluconate 15 meq/


 Multivitamins 10


 ml/Chromium/


 Copper/Manganese/


 Seleni/Zn 0.5 ml/


 Insulin Human


 Regular 25 unit/


 Total Parenteral


 Nutrition/Amino


 Acids/Dextrose/


 Fat Emulsion


 Intravenous  1,400 ml @ 


 58.333 mls/


 hr  TPN  CONT  4/6/20 22:00


 4/7/20 21:59 DC 4/6/20 22:24


58.333 MLS/HR


 


 Sodium Chloride


 90 meq/Potassium


 Chloride 15 meq/


 Potassium


 Phosphate 15 mmol/


 Magnesium Sulfate


 10 meq/Calcium


 Gluconate 15 meq/


 Multivitamins 10


 ml/Chromium/


 Copper/Manganese/


 Seleni/Zn 0.5 ml/


 Total Parenteral


 Nutrition/Amino


 Acids/Dextrose/


 Fat Emulsion


 Intravenous  1,400 ml @ 


 58.333 mls/


 hr  TPN  CONT  3/24/20 22:00


 3/25/20 21:59 DC 3/24/20 22:17


58.333 MLS/HR


 


 Sodium Chloride


 90 meq/Potassium


 Chloride 15 meq/


 Potassium


 Phosphate 15 mmol/


 Magnesium Sulfate


 10 meq/Calcium


 Gluconate 20 meq/


 Multivitamins 10


 ml/Chromium/


 Copper/Manganese/


 Seleni/Zn 0.5 ml/


 Total Parenteral


 Nutrition/Amino


 Acids/Dextrose/


 Fat Emulsion


 Intravenous  1,200 ml @ 


 50 mls/hr  TPN  CONT  3/22/20 22:00


 3/22/20 14:17 DC  





 


 Sodium Chloride


 90 meq/Potassium


 Chloride 15 meq/


 Potassium


 Phosphate 18 mmol/


 Magnesium Sulfate


 8 meq/Calcium


 Gluconate 15 meq/


 Multivitamins 10


 ml/Chromium/


 Copper/Manganese/


 Seleni/Zn 0.5 ml/


 Insulin Human


 Regular 10 unit/


 Total Parenteral


 Nutrition/Amino


 Acids/Dextrose/


 Fat Emulsion


 Intravenous  1,400 ml @ 


 58.333 mls/


 hr  TPN  CONT  3/27/20 22:00


 3/28/20 21:59 DC 3/27/20 21:43


58.333 MLS/HR


 


 Sodium Chloride


 90 meq/Potassium


 Chloride 15 meq/


 Potassium


 Phosphate 18 mmol/


 Magnesium Sulfate


 8 meq/Calcium


 Gluconate 15 meq/


 Multivitamins 10


 ml/Chromium/


 Copper/Manganese/


 Seleni/Zn 0.5 ml/


 Insulin Human


 Regular 15 unit/


 Total Parenteral


 Nutrition/Amino


 Acids/Dextrose/


 Fat Emulsion


 Intravenous  1,400 ml @ 


 58.333 mls/


 hr  TPN  CONT  3/30/20 22:00


 3/31/20 21:59 DC 3/30/20 21:47


58.333 MLS/HR


 


 Sodium Chloride


 90 meq/Potassium


 Chloride 15 meq/


 Potassium


 Phosphate 18 mmol/


 Magnesium Sulfate


 8 meq/Calcium


 Gluconate 15 meq/


 Multivitamins 10


 ml/Chromium/


 Copper/Manganese/


 Seleni/Zn 0.5 ml/


 Insulin Human


 Regular 20 unit/


 Total Parenteral


 Nutrition/Amino


 Acids/Dextrose/


 Fat Emulsion


 Intravenous  1,400 ml @ 


 58.333 mls/


 hr  TPN  CONT  4/2/20 22:00


 4/3/20 21:59 DC 4/2/20 22:45


58.333 MLS/HR


 


 Sodium Chloride


 90 meq/Potassium


 Chloride 15 meq/


 Potassium


 Phosphate 18 mmol/


 Magnesium Sulfate


 8 meq/Calcium


 Gluconate 15 meq/


 Multivitamins 10


 ml/Chromium/


 Copper/Manganese/


 Seleni/Zn 0.5 ml/


 Total Parenteral


 Nutrition/Amino


 Acids/Dextrose/


 Fat Emulsion


 Intravenous  1,400 ml @ 


 58.333 mls/


 hr  TPN  CONT  3/26/20 22:00


 3/27/20 21:59 DC 3/26/20 22:00


58.333 MLS/HR


 


 Sodium Chloride


 90 meq/Potassium


 Phosphate 15 mmol/


 Magnesium Sulfate


 12 meq/Calcium


 Gluconate 15 meq/


 Multivitamins 10


 ml/Chromium/


 Copper/Manganese/


 Seleni/Zn 0.5 ml/


 Insulin Human


 Regular 30 unit/


 Total Parenteral


 Nutrition/Amino


 Acids/Dextrose/


 Fat Emulsion


 Intravenous  1,400 ml @ 


 58.333 mls/


 hr  TPN  CONT  4/10/20 22:00


 4/11/20 21:59 DC 4/10/20 21:49


58.333 MLS/HR


 


 Sodium Chloride


 90 meq/Potassium


 Phosphate 15 mmol/


 Magnesium Sulfate


 12 meq/Calcium


 Gluconate 15 meq/


 Multivitamins 10


 ml/Chromium/


 Copper/Manganese/


 Seleni/Zn 0.5 ml/


 Insulin Human


 Regular 40 unit/


 Total Parenteral


 Nutrition/Amino


 Acids/Dextrose/


 Fat Emulsion


 Intravenous  1,400 ml @ 


 58.333 mls/


 hr  TPN  CONT  4/11/20 22:00


 4/12/20 21:59 DC 4/11/20 21:21


58.333 MLS/HR


 


 Sodium Chloride


 90 meq/Potassium


 Phosphate 19 mmol/


 Magnesium Sulfate


 12 meq/Calcium


 Gluconate 15 meq/


 Multivitamins 10


 ml/Chromium/


 Copper/Manganese/


 Seleni/Zn 0.5 ml/


 Insulin Human


 Regular 40 unit/


 Total Parenteral


 Nutrition/Amino


 Acids/Dextrose/


 Fat Emulsion


 Intravenous  1,400 ml @ 


 58.333 mls/


 hr  TPN  CONT  4/12/20 22:00


 4/13/20 21:59 DC 4/12/20 21:54


58.333 MLS/HR


 


 Sodium Chloride


 90 meq/Potassium


 Phosphate 5 mmol/


 Magnesium Sulfate


 12 meq/Calcium


 Gluconate 15 meq/


 Multivitamins 10


 ml/Chromium/


 Copper/Manganese/


 Seleni/Zn 0.5 ml/


 Insulin Human


 Regular 30 unit/


 Total Parenteral


 Nutrition/Amino


 Acids/Dextrose/


 Fat Emulsion


 Intravenous  1,400 ml @ 


 58.333 mls/


 hr  TPN  CONT  4/9/20 22:00


 4/10/20 21:59 DC 4/9/20 22:08


58.333 MLS/HR


 


 Sodium Chloride


 100 meq/Potassium


 Chloride 40 meq/


 Magnesium Sulfate


 15 meq/Calcium


 Gluconate 15 meq/


 Multivitamins 10


 ml/Chromium/


 Copper/Manganese/


 Seleni/Zn 0.5 ml/


 Insulin Human


 Regular 35 unit/


 Total Parenteral


 Nutrition/Amino


 Acids/Dextrose/


 Fat Emulsion


 Intravenous  1,400 ml @ 


 58.333 mls/


 hr  TPN  CONT  4/19/20 22:00


 4/20/20 21:59 DC 4/19/20 22:46


58.333 MLS/HR


 


 Sodium Chloride


 100 meq/Potassium


 Chloride 40 meq/


 Magnesium Sulfate


 20 meq/Calcium


 Gluconate 15 meq/


 Multivitamins 10


 ml/Chromium/


 Copper/Manganese/


 Seleni/Zn 0.5 ml/


 Insulin Human


 Regular 35 unit/


 Total Parenteral


 Nutrition/Amino


 Acids/Dextrose/


 Fat Emulsion


 Intravenous  1,400 ml @ 


 58.333 mls/


 hr  TPN  CONT  4/20/20 22:00


 4/21/20 21:59  4/20/20 22:31


58.333 MLS/HR


 


 Sodium Chloride


 100 meq/Potassium


 Phosphate 10 mmol/


 Magnesium Sulfate


 12 meq/Calcium


 Gluconate 15 meq/


 Multivitamins 10


 ml/Chromium/


 Copper/Manganese/


 Seleni/Zn 0.5 ml/


 Insulin Human


 Regular 35 unit/


 Potassium


 Chloride 20 meq/


 Total Parenteral


 Nutrition/Amino


 Acids/Dextrose/


 Fat Emulsion


 Intravenous  1,400 ml @ 


 58.333 mls/


 hr  TPN  CONT  4/16/20 22:00


 4/17/20 21:59 DC 4/16/20 22:10


58.333 MLS/HR


 


 Sodium Chloride


 100 meq/Potassium


 Phosphate 19 mmol/


 Magnesium Sulfate


 12 meq/Calcium


 Gluconate 15 meq/


 Multivitamins 10


 ml/Chromium/


 Copper/Manganese/


 Seleni/Zn 0.5 ml/


 Insulin Human


 Regular 40 unit/


 Potassium


 Chloride 20 meq/


 Total Parenteral


 Nutrition/Amino


 Acids/Dextrose/


 Fat Emulsion


 Intravenous  1,400 ml @ 


 58.333 mls/


 hr  TPN  CONT  4/15/20 22:00


 4/16/20 21:59 DC 4/15/20 21:20


58.333 MLS/HR


 


 Sodium Chloride


 100 meq/Potassium


 Phosphate 5 mmol/


 Magnesium Sulfate


 12 meq/Calcium


 Gluconate 15 meq/


 Multivitamins 10


 ml/Chromium/


 Copper/Manganese/


 Seleni/Zn 0.5 ml/


 Insulin Human


 Regular 35 unit/


 Potassium


 Chloride 20 meq/


 Total Parenteral


 Nutrition/Amino


 Acids/Dextrose/


 Fat Emulsion


 Intravenous  1,400 ml @ 


 58.333 mls/


 hr  TPN  CONT  4/17/20 22:00


 4/18/20 21:59 DC 4/17/20 22:59


58.333 MLS/HR


 


 Succinylcholine


 Chloride


  (Anectine)  120 mg  1X  ONCE  3/23/20 08:30


 3/23/20 08:31 DC 3/23/20 08:34


120 MG








Lab





Laboratory Tests








Test


 4/20/20


12:23 4/20/20


18:27 4/21/20


00:01 4/21/20


05:45


 


Glucose (Fingerstick)


 176 mg/dL


(70-99) 145 mg/dL


(70-99) 170 mg/dL


(70-99) 





 


Sodium Level


 


 


 


 142 mmol/L


(136-145)


 


Potassium Level


 


 


 


 3.7 mmol/L


(3.5-5.1)


 


Chloride Level


 


 


 


 105 mmol/L


()


 


Carbon Dioxide Level


 


 


 


 23 mmol/L


(21-32)


 


Anion Gap    14 (6-14) 


 


Blood Urea Nitrogen


 


 


 


 89 mg/dL


(7-20)


 


Creatinine


 


 


 


 1.6 mg/dL


(0.6-1.0)


 


Estimated GFR


(Cockcroft-Gault) 


 


 


 34.3 





 


Glucose Level


 


 


 


 141 mg/dL


(70-99)


 


Calcium Level


 


 


 


 8.9 mg/dL


(8.5-10.1)


 


Magnesium Level


 


 


 


 2.2 mg/dL


(1.8-2.4)


 


Test


 4/21/20


06:04 


 


 





 


Glucose (Fingerstick)


 136 mg/dL


(70-99) 


 


 











Results


All relevant outside records, renal labs, imaging studies, telemetry/EKG's were 

reviewed.











KIMBERLY MYERS MD                Apr 21, 2020 09:47

## 2020-04-21 NOTE — NUR
Pharmacy TPN Dosing Note



S: JESENIA RICH is a 49 year old F Currently receiving Central Continuous TPN started 
03/18/20



B:Pertinent PMH: Necrotizing pancreatitis



Height: 5 feet, 8 inches

Weight: 108.1 kg



Current diet: NPO 



LABS:

Sodium:    142 

Potassium: 3.7 

Chloride:  105 

Calcium:   8.9 

Corrected Calcium: 9.86 

Magnesium: 2.2 

CO2:       25 

SCr:       1.6 

Glucose:   170, 141, 136 

Albumin:   2.8 

AST:       23 

ALT:       11 



TPN FORMULA:

TPN TYPE:  Central Continuous

AMINO ACIDS:         125 gm

DEXTROSE:            225 gm

LIPIDS:              20 gm

SODIUM CHLORIDE:     100 mEq

POTASSIUM CHLORIDE:  40 mEq

MAGNESIUM:           20 mEq

CALCIUM:             15 mEq

INSULIN:             35 units

MULTIPLE VITAMIN:    10 ml

TRACE ELEMENTS:      0.5 ml



TPN PLAN:  

-Electrolytes and glucose stable today, no changes in TPN.

-Renal panel tomorrow per nephrology.





R: Continue TPN @ current rate and with above formula. 

Will monitor electrolytes, glucose, and tolerance to TPN.



 VALERIE SNELL [facility], 04/21/20 [id number]

## 2020-04-21 NOTE — PDOC
PULMONARY PROGRESS NOTES


Subjective


Patient intubated on 3/23 , s/p trach 4/6, 


Patient responding to verbal stimuli


Vitals





Vital Signs








  Date Time  Temp Pulse Resp B/P (MAP) Pulse Ox O2 Delivery O2 Flow Rate FiO2


 


4/21/20 07:42     100 Ventilator  


 


4/21/20 06:00 102.2 124 28 158/98 (118)    





 102.2       








Comments


ros unable to obtain  on vent


Lungs:  Crackles, Other (dimished in BLL  nc at perrl   nose clear   neck trach 

site ok   no lad  no thyromegaly)


Cardiovascular:  S1, S2


Abdomen:  Soft, Non-tender, Other (distended)


Extremities:  Other (+3 generalized edema )


Skin:  Warm, Dry


Labs





Laboratory Tests








Test


 4/19/20


11:30 4/19/20


11:50 4/19/20


17:24 4/20/20


00:41


 


O2 Saturation 96 % (92-99)    


 


Arterial Blood pH


 7.47


(7.35-7.45) 


 


 





 


Arterial Blood pCO2 at


Patient Temp 36 mmHg


(35-46) 


 


 





 


Arterial Blood pO2 at Patient


Temp 88 mmHg


() 


 


 





 


Arterial Blood HCO3


 25 mmol/L


(21-28) 


 


 





 


Arterial Blood Base Excess


 1 mmol/L


(-3-3) 


 


 





 


FiO2 35%+5peep    


 


Glucose (Fingerstick)


 


 165 mg/dL


(70-99) 117 mg/dL


(70-99) 147 mg/dL


(70-99)


 


Test


 4/20/20


05:50 4/20/20


06:37 4/20/20


12:23 4/20/20


18:27


 


White Blood Count


 10.4 x10^3/uL


(4.0-11.0) 


 


 





 


Red Blood Count


 2.50 x10^6/uL


(3.50-5.40) 


 


 





 


Hemoglobin


 7.7 g/dL


(12.0-15.5) 


 


 





 


Hematocrit


 23.3 %


(36.0-47.0) 


 


 





 


Mean Corpuscular Volume 93 fL ()    


 


Mean Corpuscular Hemoglobin 31 pg (25-35)    


 


Mean Corpuscular Hemoglobin


Concent 33 g/dL


(31-37) 


 


 





 


Red Cell Distribution Width


 18.1 %


(11.5-14.5) 


 


 





 


Platelet Count


 545 x10^3/uL


(140-400) 


 


 





 


Neutrophils (%) (Auto) 75 % (31-73)    


 


Lymphocytes (%) (Auto) 15 % (24-48)    


 


Monocytes (%) (Auto) 10 % (0-9)    


 


Eosinophils (%) (Auto) 0 % (0-3)    


 


Basophils (%) (Auto) 0 % (0-3)    


 


Neutrophils # (Auto)


 7.8 x10^3/uL


(1.8-7.7) 


 


 





 


Lymphocytes # (Auto)


 1.6 x10^3/uL


(1.0-4.8) 


 


 





 


Monocytes # (Auto)


 1.0 x10^3/uL


(0.0-1.1) 


 


 





 


Eosinophils # (Auto)


 0.0 x10^3/uL


(0.0-0.7) 


 


 





 


Basophils # (Auto)


 0.0 x10^3/uL


(0.0-0.2) 


 


 





 


Sodium Level


 141 mmol/L


(136-145) 


 


 





 


Potassium Level


 3.8 mmol/L


(3.5-5.1) 


 


 





 


Chloride Level


 104 mmol/L


() 


 


 





 


Carbon Dioxide Level


 25 mmol/L


(21-32) 


 


 





 


Anion Gap 12 (6-14)    


 


Blood Urea Nitrogen


 76 mg/dL


(7-20) 


 


 





 


Creatinine


 1.6 mg/dL


(0.6-1.0) 


 


 





 


Estimated GFR


(Cockcroft-Gault) 34.3 


 


 


 





 


Glucose Level


 133 mg/dL


(70-99) 


 


 





 


Calcium Level


 8.8 mg/dL


(8.5-10.1) 


 


 





 


Phosphorus Level


 4.0 mg/dL


(2.6-4.7) 


 


 





 


Magnesium Level


 1.7 mg/dL


(1.8-2.4) 


 


 





 


Triglycerides Level


 144 mg/dL


(0-150) 


 


 





 


Glucose (Fingerstick)


 


 136 mg/dL


(70-99) 176 mg/dL


(70-99) 145 mg/dL


(70-99)


 


Test


 4/21/20


00:01 4/21/20


05:45 4/21/20


06:04 





 


Glucose (Fingerstick)


 170 mg/dL


(70-99) 


 136 mg/dL


(70-99) 





 


Sodium Level


 


 142 mmol/L


(136-145) 


 





 


Potassium Level


 


 3.7 mmol/L


(3.5-5.1) 


 





 


Chloride Level


 


 105 mmol/L


() 


 





 


Carbon Dioxide Level


 


 23 mmol/L


(21-32) 


 





 


Anion Gap  14 (6-14)   


 


Blood Urea Nitrogen


 


 89 mg/dL


(7-20) 


 





 


Creatinine


 


 1.6 mg/dL


(0.6-1.0) 


 





 


Estimated GFR


(Cockcroft-Gault) 


 34.3 


 


 





 


Glucose Level


 


 141 mg/dL


(70-99) 


 





 


Calcium Level


 


 8.9 mg/dL


(8.5-10.1) 


 





 


Magnesium Level


 


 2.2 mg/dL


(1.8-2.4) 


 











Laboratory Tests








Test


 4/20/20


12:23 4/20/20


18:27 4/21/20


00:01 4/21/20


05:45


 


Glucose (Fingerstick)


 176 mg/dL


(70-99) 145 mg/dL


(70-99) 170 mg/dL


(70-99) 





 


Sodium Level


 


 


 


 142 mmol/L


(136-145)


 


Potassium Level


 


 


 


 3.7 mmol/L


(3.5-5.1)


 


Chloride Level


 


 


 


 105 mmol/L


()


 


Carbon Dioxide Level


 


 


 


 23 mmol/L


(21-32)


 


Anion Gap    14 (6-14) 


 


Blood Urea Nitrogen


 


 


 


 89 mg/dL


(7-20)


 


Creatinine


 


 


 


 1.6 mg/dL


(0.6-1.0)


 


Estimated GFR


(Cockcroft-Gault) 


 


 


 34.3 





 


Glucose Level


 


 


 


 141 mg/dL


(70-99)


 


Calcium Level


 


 


 


 8.9 mg/dL


(8.5-10.1)


 


Magnesium Level


 


 


 


 2.2 mg/dL


(1.8-2.4)


 


Test


 4/21/20


06:04 


 


 





 


Glucose (Fingerstick)


 136 mg/dL


(70-99) 


 


 











Medications





Active Scripts








 Medications  Dose


 Route/Sig


 Max Daily Dose Days Date Category


 


 Bisoprolol


 Fumarate 5 Mg


 Tablet  10 Mg


 PO DAILY


   3/16/20 Reported











Impression


.


IMPRESSION:


1.  Acute hypoxemic respiratory failure secondary to ARDS status post trach,


2.  Gallstone pancreatitis


3.  Severe metabolic acidosis.stable


4.  Acute kidney injury-stable, ON HD-- continue to improve 


5.  Acute gallstone pancreatitis.


6.  Hypoalbuminemia.


7.  Moderate persistent effusions


8.  Fever-persist.  Per ID, per surgery


9.  Chronic anemia


10. Covid 19 testing negative


11. Moderate to large ascites-S/P paracentisis


S/P paracentisis with 4 liters removed on 4/15/20





Plan


.


Not ready for weaning trials.


hep sq and protonix for prophylaxis


Follow surgery recs


Follow ID rec, abx per id


Follow nephrology recs 


Nutritional support per surgery


continue TPN for nutrition 





DVT/GI PPX 


d/w RN/RT











STEVE MIRANDA MD              Apr 21, 2020 08:35

## 2020-04-21 NOTE — PDOC
[facility] PROGRESS NOTE


Chief Complaint


Chief Complaint


Respiratory failure requiring mechanical ventilation (on vent since 3/23)


Tracheostomy


bilateral pleural effusions/pulm edema 


Sepsis


Severe Acute gallstone pancreatitis (not a surgical candidate at this time) with

necrosis


Acute kidney failure now requiring dialysis


Salpingitis


Gallstones (Calculus of gallbladder with acute cholecystitis without 

obstruction)


HTN 


Leukocytosis 


Hypoxia


Uterine fibroid


Intractable pain


Intractable nausea


Covid 19 negative. 


Acute on chronic anemia 


EEG: No seizure activity.


ESRD on HD


Hyperglycemia, persistently in 200s





History of Present Illness


History of Present Illness


4-


Patient seen in ICU room 116 she is currently on dialysis


Trideborah to mumble is grabbing at her face with her left hand


She is extremely weak cannot communicate well


She is swollen


Chart reviewed


Discussed with RN


Very critically ill











4- patient seen and examined in the ICU





She is on the vent via tracheostomy


AC/14/4 50/35%


Has IV TPN


NG to suction


Wearing mitts for patient safety


Still seems encephalopathic


Chart reviewed


Discussed with RN


She remains critically ill











Ms Tadeo is a 50yo F w/ PMHx HTN, prediabetes who presented to the emergency 

room with complaints of abdominal pain on 3/16/2020. Found with Lipase 31051, 

, , Bilirubin 1.4.


CT abdomen confirms pancreatic inflammation, peripancreatic fluid and 

inflammatory changes around the pancreas consistent with pancreatitis. 

Cholelithiasis and 1.4cm uterine fibroid as well as possible left salpingitis. 

Admitted for further care


GI, General surgery, ID, Pulm consulted.





3/17: No urine output. Added dilaudid for pain, PICC placed per IR. Renal US 

negative.Seen bedside in ICU, given 2L additional NSS and albumin infusion. 

Still hypotensive, started on levophed. Repeat CT abdomen w/ necrosis. 


3/18: O2 saturation 87% on nasal cannula oxygen. Dialysis catheter per 

nephrology


3/19: She is now on BiPAP appears more ill, now on dialysis


3/20: Seen on BiPAP. Her mother and another family member are present and seemed

to be good support for her. Currently on dialysis. Appears critically ill


3/21: Overnight Tmax 101.7 , still on BiPAP FiO2 40%, still on low dose Levophed

gtt, TPN initiated. On dialysis


4/6: Tracheostomy


4/12: S/p tracheostomy on vent spontaneous respirations with 5 of pressure 

support 35% FiO2, rectal tube and a [place], off pressors


4/13: Off pressors. Seen on dialysis this morning. BUN 80. Tracking with her 

eyes. Still on vent via trach.


4/14: BUN 68, Cr 1.7. Temp 100.2F axillary. WBC 9.8. Still on vent via trach. 

Tracking reasonably well. In obvious discomfort. Removed PICC and CVC LIJ and 

replaced. Tips sent for culture. CT chest/abd/pelvis with bilateral pleural 

effusion and ascites.


4/15: Renal function stable. Still on vent. More interactive today. Miming wish 

for food. Plan discussed for thoracentesis/paracentesis with daughters today. 

They were under impression patient was doing worse due to a miscommunication 

which has been clarified over the phone. 4.3L removed.


4/16: BUN 88, Cr 1.8. Much more interactive today. Appears more comfortable 

today.


4/17: Febrile overnight 101.8F. More interactive, still on vent. Asking for ice 

by miming.


4/18: Afebrile overnight. TMax last 24 hours 100.6F. Hb 7.1. Interactive when 

awake.





Afebrile. Hb 7. Not tolerating vent wean. Very interactive, on low dose 

precedex. Excellent UOP over the past 72 hours





Plan:


Cont vent weaning, dialysis


Trach shield during day if ok with pulm. Would recommend ABG after 4 hours to 

r/o CO2 retention, however and still vent overnight





Vitals/I&O


Vitals/I&O:





                                   Vital Signs








  Date Time  Temp Pulse Resp B/P (MAP) Pulse Ox O2 Delivery O2 Flow Rate FiO2


 


4/21/20 08:00  98 18 118/64 (82) 100 Ventilator  


 


4/21/20 07:00 99.9       





 99.9       











l


                                    I & O   


 


 4/20/20 4/20/20 4/21/20





 14:59 22:59 06:59


 


Intake Total 100 ml 2434.1 ml 955 ml


 


Output Total 875 ml 910 ml 1070 ml


 


Balance -775 ml 1524.1 ml -115 ml











Physical Exam


Physical Exam:


GENERAL: Currently on dialysis


HEENT: Pupils equal, + NGT, oral cavity dry 


NECK:  Trach/vent 


LUNGS: rhonchi 


HEART:  S1, S2, regular 


ABDOMEN: Distended, tender, hypoactive BS, 


: [place] (4/14)


EXTREMITIES: Generalized edema, no cyanosis, SCDs bilaterally 


DERMATOLOGIC:  Warm and dry.  No generalized rash.  


CENTRAL NERVOUS SYSTEM: Extremely weak trying to talk but unable to understand


HDC & LIJ (4/14) clean


General:  Alert, Cooperative


Heart:  Regular rate, Normal S1, Other (increased rate)


Lungs:  Crackles, Other (dimished in BLL  nc at perrl   nose clear   neck trach 

site ok   no lad  no thyromegaly)


Abdomen:  Other (distended )


Extremities:  Other (ANASARCA)


Skin:  Other (mottling noted to extremities )





Labs


Labs:





Laboratory Tests








Test


 4/20/20


12:23 4/20/20


18:27 4/21/20


00:01 4/21/20


05:45


 


Glucose (Fingerstick)


 176 mg/dL


(70-99) 145 mg/dL


(70-99) 170 mg/dL


(70-99) 





 


White Blood Count


 


 


 


 16.3 x10^3/uL


(4.0-11.0)


 


Red Blood Count


 


 


 


 2.68 x10^6/uL


(3.50-5.40)


 


Hemoglobin


 


 


 


 8.2 g/dL


(12.0-15.5)


 


Hematocrit


 


 


 


 25.5 %


(36.0-47.0)


 


Mean Corpuscular Volume    95 fL () 


 


Mean Corpuscular Hemoglobin    31 pg (25-35) 


 


Mean Corpuscular Hemoglobin


Concent 


 


 


 32 g/dL


(31-37)


 


Red Cell Distribution Width


 


 


 


 18.8 %


(11.5-14.5)


 


Platelet Count


 


 


 


 521 x10^3/uL


(140-400)


 


Neutrophils (%) (Auto)    79 % (31-73) 


 


Lymphocytes (%) (Auto)    13 % (24-48) 


 


Monocytes (%) (Auto)    8 % (0-9) 


 


Eosinophils (%) (Auto)    0 % (0-3) 


 


Basophils (%) (Auto)    1 % (0-3) 


 


Neutrophils # (Auto)


 


 


 


 12.8 x10^3/uL


(1.8-7.7)


 


Lymphocytes # (Auto)


 


 


 


 2.1 x10^3/uL


(1.0-4.8)


 


Monocytes # (Auto)


 


 


 


 1.2 x10^3/uL


(0.0-1.1)


 


Eosinophils # (Auto)


 


 


 


 0.0 x10^3/uL


(0.0-0.7)


 


Basophils # (Auto)


 


 


 


 0.1 x10^3/uL


(0.0-0.2)


 


Sodium Level


 


 


 


 142 mmol/L


(136-145)


 


Potassium Level


 


 


 


 3.7 mmol/L


(3.5-5.1)


 


Chloride Level


 


 


 


 105 mmol/L


()


 


Carbon Dioxide Level


 


 


 


 23 mmol/L


(21-32)


 


Anion Gap    14 (6-14) 


 


Blood Urea Nitrogen


 


 


 


 89 mg/dL


(7-20)


 


Creatinine


 


 


 


 1.6 mg/dL


(0.6-1.0)


 


Estimated GFR


(Cockcroft-Gault) 


 


 


 34.3 





 


Glucose Level


 


 


 


 141 mg/dL


(70-99)


 


Calcium Level


 


 


 


 8.9 mg/dL


(8.5-10.1)


 


Magnesium Level


 


 


 


 2.2 mg/dL


(1.8-2.4)


 


Test


 4/21/20


06:04 


 


 





 


Glucose (Fingerstick)


 136 mg/dL


(70-99) 


 


 














Review of Systems


Review of Systems:


Unable to obtain





Assessment and Plan


Assessmemt and Plan


Problems


Medical Problems:


(1) Acute pancreatitis


Status: Acute  





(2) Cholelithiasis


Status: Acute  





Respiratory failure requiring intubation


Status post tracheostomy


Severe Acute gallstone pancreatitis (she is not a surgical candidate as she is 

too ill)


Acute kidney failure now requiring dialysis


Salpingo--itis


Gallstones (Calculus of gallbladder with acute cholecystitis without 

obstruction)


HTN 


Leukocytosis 


Hypoxia


Uterine fibroid


Hypoxia with respiratory failure


Intractable pain


Intractable nausea





Plan


ICU monitoring


Tracheostomy care


NG suctioning


Vent management per pulm. pressure support as tolerated


Dialysis per nephro


Merrem (4/8) and Zyvox (4/10) and micafungin 


Follow cultures


Trach care


Nutritional support


When necessary levo fed


Neuro following


No plans for sx at present as she is to ill still


Discharge disposition pending (? Eventual LTAC)


She is still critically ill


Prognosis very guarded


Total time 35 min





Comment


Review of Relevant


I have reviewed the following items josy (where applicable) has been applied.


Medications:





Current Medications








 Medications


  (Trade)  Dose


 Ordered  Sig/Yee


 Route


 PRN Reason  Start Time


 Stop Time Status Last Admin


Dose Admin


 


 Sodium Chloride


 100 meq/Potassium


 Chloride 40 meq/


 Magnesium Sulfate


 20 meq/Calcium


 Gluconate 15 meq/


 Multivitamins 10


 ml/Chromium/


 Copper/Manganese/


 Seleni/Zn 0.5 ml/


 Insulin Human


 Regular 35 unit/


 Total Parenteral


 Nutrition/Amino


 Acids/Dextrose/


 Fat Emulsion


 Intravenous  1,400 ml @ 


 58.333 mls/


 hr  TPN  CONT


 IV


   4/20/20 22:00


 4/21/20 21:59  4/20/20 22:31





 


 Fentanyl Citrate


  (Fentanyl 2ml


 Vial)  50 mcg  PRN Q2HR  PRN


 IVP


 PAIN  4/20/20 21:00


    4/21/20 02:23





 


 Albumin Human  200 ml @ 


 200 mls/hr  1X PRN  PRN


 IV


 Hypotension  4/21/20 08:00


 4/21/20 13:59  4/21/20 08:40














Hemodynamically unstable?:  No


Is patient in severe pain?:  No


Is NPO status required?:  Yes











HECTOR MASON III DO           Apr 21, 2020 11:22

## 2020-04-21 NOTE — PDOC
SAURAV NASH APRN 4/21/20 1005:


SURGICAL PROGRESS NOTE


Subjective


seen during dialysis


fevers--d/w ID--asking about operative intervention


Vital Signs





Vital Signs








  Date Time  Temp Pulse Resp B/P (MAP) Pulse Ox O2 Delivery O2 Flow Rate FiO2


 


4/21/20 07:42     100 Ventilator  


 


4/21/20 06:00 102.2 124 28 158/98 (118)    





 102.2       








I&O











Intake and Output 


 


 4/21/20





 07:00


 


Intake Total 3489.1 ml


 


Output Total 2855 ml


 


Balance 634.1 ml


 


 


 


IV Total 3489.1 ml


 


Output Urine Total 2655 ml


 


Gastric Drainage Total 200 ml








PATIENT HAS A VILLASENOR:  Yes


General:  Alert, Cooperative


Abdomen:  Other (distended )


Labs





Laboratory Tests








Test


 4/19/20


11:30 4/19/20


11:50 4/19/20


17:24 4/20/20


00:41


 


O2 Saturation 96 % (92-99)    


 


Arterial Blood pH


 7.47


(7.35-7.45) 


 


 





 


Arterial Blood pCO2 at


Patient Temp 36 mmHg


(35-46) 


 


 





 


Arterial Blood pO2 at Patient


Temp 88 mmHg


() 


 


 





 


Arterial Blood HCO3


 25 mmol/L


(21-28) 


 


 





 


Arterial Blood Base Excess


 1 mmol/L


(-3-3) 


 


 





 


FiO2 35%+5peep    


 


Glucose (Fingerstick)


 


 165 mg/dL


(70-99) 117 mg/dL


(70-99) 147 mg/dL


(70-99)


 


Test


 4/20/20


05:50 4/20/20


06:37 4/20/20


12:23 4/20/20


18:27


 


White Blood Count


 10.4 x10^3/uL


(4.0-11.0) 


 


 





 


Red Blood Count


 2.50 x10^6/uL


(3.50-5.40) 


 


 





 


Hemoglobin


 7.7 g/dL


(12.0-15.5) 


 


 





 


Hematocrit


 23.3 %


(36.0-47.0) 


 


 





 


Mean Corpuscular Volume 93 fL ()    


 


Mean Corpuscular Hemoglobin 31 pg (25-35)    


 


Mean Corpuscular Hemoglobin


Concent 33 g/dL


(31-37) 


 


 





 


Red Cell Distribution Width


 18.1 %


(11.5-14.5) 


 


 





 


Platelet Count


 545 x10^3/uL


(140-400) 


 


 





 


Neutrophils (%) (Auto) 75 % (31-73)    


 


Lymphocytes (%) (Auto) 15 % (24-48)    


 


Monocytes (%) (Auto) 10 % (0-9)    


 


Eosinophils (%) (Auto) 0 % (0-3)    


 


Basophils (%) (Auto) 0 % (0-3)    


 


Neutrophils # (Auto)


 7.8 x10^3/uL


(1.8-7.7) 


 


 





 


Lymphocytes # (Auto)


 1.6 x10^3/uL


(1.0-4.8) 


 


 





 


Monocytes # (Auto)


 1.0 x10^3/uL


(0.0-1.1) 


 


 





 


Eosinophils # (Auto)


 0.0 x10^3/uL


(0.0-0.7) 


 


 





 


Basophils # (Auto)


 0.0 x10^3/uL


(0.0-0.2) 


 


 





 


Sodium Level


 141 mmol/L


(136-145) 


 


 





 


Potassium Level


 3.8 mmol/L


(3.5-5.1) 


 


 





 


Chloride Level


 104 mmol/L


() 


 


 





 


Carbon Dioxide Level


 25 mmol/L


(21-32) 


 


 





 


Anion Gap 12 (6-14)    


 


Blood Urea Nitrogen


 76 mg/dL


(7-20) 


 


 





 


Creatinine


 1.6 mg/dL


(0.6-1.0) 


 


 





 


Estimated GFR


(Cockcroft-Gault) 34.3 


 


 


 





 


Glucose Level


 133 mg/dL


(70-99) 


 


 





 


Calcium Level


 8.8 mg/dL


(8.5-10.1) 


 


 





 


Phosphorus Level


 4.0 mg/dL


(2.6-4.7) 


 


 





 


Magnesium Level


 1.7 mg/dL


(1.8-2.4) 


 


 





 


Triglycerides Level


 144 mg/dL


(0-150) 


 


 





 


Glucose (Fingerstick)


 


 136 mg/dL


(70-99) 176 mg/dL


(70-99) 145 mg/dL


(70-99)


 


Test


 4/21/20


00:01 4/21/20


05:45 4/21/20


06:04 





 


Glucose (Fingerstick)


 170 mg/dL


(70-99) 


 136 mg/dL


(70-99) 





 


Sodium Level


 


 142 mmol/L


(136-145) 


 





 


Potassium Level


 


 3.7 mmol/L


(3.5-5.1) 


 





 


Chloride Level


 


 105 mmol/L


() 


 





 


Carbon Dioxide Level


 


 23 mmol/L


(21-32) 


 





 


Anion Gap  14 (6-14)   


 


Blood Urea Nitrogen


 


 89 mg/dL


(7-20) 


 





 


Creatinine


 


 1.6 mg/dL


(0.6-1.0) 


 





 


Estimated GFR


(Cockcroft-Gault) 


 34.3 


 


 





 


Glucose Level


 


 141 mg/dL


(70-99) 


 





 


Calcium Level


 


 8.9 mg/dL


(8.5-10.1) 


 





 


Magnesium Level


 


 2.2 mg/dL


(1.8-2.4) 


 











Laboratory Tests








Test


 4/20/20


12:23 4/20/20


18:27 4/21/20


00:01 4/21/20


05:45


 


Glucose (Fingerstick)


 176 mg/dL


(70-99) 145 mg/dL


(70-99) 170 mg/dL


(70-99) 





 


Sodium Level


 


 


 


 142 mmol/L


(136-145)


 


Potassium Level


 


 


 


 3.7 mmol/L


(3.5-5.1)


 


Chloride Level


 


 


 


 105 mmol/L


()


 


Carbon Dioxide Level


 


 


 


 23 mmol/L


(21-32)


 


Anion Gap    14 (6-14) 


 


Blood Urea Nitrogen


 


 


 


 89 mg/dL


(7-20)


 


Creatinine


 


 


 


 1.6 mg/dL


(0.6-1.0)


 


Estimated GFR


(Cockcroft-Gault) 


 


 


 34.3 





 


Glucose Level


 


 


 


 141 mg/dL


(70-99)


 


Calcium Level


 


 


 


 8.9 mg/dL


(8.5-10.1)


 


Magnesium Level


 


 


 


 2.2 mg/dL


(1.8-2.4)


 


Test


 4/21/20


06:04 


 


 





 


Glucose (Fingerstick)


 136 mg/dL


(70-99) 


 


 











Problem List


Problems


Medical Problems:


(1) Acute pancreatitis


Status: Acute  





(2) Cholelithiasis


Status: Acute  








Assessment/Plan


will have Dr Flores review tomorrow when he returns





KIEL BAL MD 4/21/20 1211:


SURGICAL PROGRESS NOTE


Assessment/Plan


seen in dialysis


awake


tries to communicate  


ID note reviewed


as above


will d/w SAURAV Vee APRN            Apr 21, 2020 10:05


KIEL BAL MD                Apr 21, 2020 12:11

## 2020-04-22 VITALS — DIASTOLIC BLOOD PRESSURE: 92 MMHG | SYSTOLIC BLOOD PRESSURE: 178 MMHG

## 2020-04-22 VITALS — DIASTOLIC BLOOD PRESSURE: 66 MMHG | SYSTOLIC BLOOD PRESSURE: 133 MMHG

## 2020-04-22 VITALS — SYSTOLIC BLOOD PRESSURE: 127 MMHG | DIASTOLIC BLOOD PRESSURE: 81 MMHG

## 2020-04-22 VITALS — DIASTOLIC BLOOD PRESSURE: 67 MMHG | SYSTOLIC BLOOD PRESSURE: 156 MMHG

## 2020-04-22 VITALS — DIASTOLIC BLOOD PRESSURE: 73 MMHG | SYSTOLIC BLOOD PRESSURE: 123 MMHG

## 2020-04-22 VITALS — DIASTOLIC BLOOD PRESSURE: 80 MMHG | SYSTOLIC BLOOD PRESSURE: 151 MMHG

## 2020-04-22 VITALS — SYSTOLIC BLOOD PRESSURE: 151 MMHG | DIASTOLIC BLOOD PRESSURE: 80 MMHG

## 2020-04-22 VITALS — SYSTOLIC BLOOD PRESSURE: 120 MMHG | DIASTOLIC BLOOD PRESSURE: 62 MMHG

## 2020-04-22 VITALS — DIASTOLIC BLOOD PRESSURE: 57 MMHG | SYSTOLIC BLOOD PRESSURE: 126 MMHG

## 2020-04-22 VITALS — SYSTOLIC BLOOD PRESSURE: 166 MMHG | DIASTOLIC BLOOD PRESSURE: 77 MMHG

## 2020-04-22 VITALS — DIASTOLIC BLOOD PRESSURE: 70 MMHG | SYSTOLIC BLOOD PRESSURE: 131 MMHG

## 2020-04-22 VITALS — SYSTOLIC BLOOD PRESSURE: 149 MMHG | DIASTOLIC BLOOD PRESSURE: 92 MMHG

## 2020-04-22 VITALS — SYSTOLIC BLOOD PRESSURE: 113 MMHG | DIASTOLIC BLOOD PRESSURE: 62 MMHG

## 2020-04-22 VITALS — DIASTOLIC BLOOD PRESSURE: 102 MMHG | SYSTOLIC BLOOD PRESSURE: 173 MMHG

## 2020-04-22 VITALS — SYSTOLIC BLOOD PRESSURE: 130 MMHG | DIASTOLIC BLOOD PRESSURE: 59 MMHG

## 2020-04-22 VITALS — DIASTOLIC BLOOD PRESSURE: 73 MMHG | SYSTOLIC BLOOD PRESSURE: 133 MMHG

## 2020-04-22 VITALS — DIASTOLIC BLOOD PRESSURE: 81 MMHG | SYSTOLIC BLOOD PRESSURE: 154 MMHG

## 2020-04-22 VITALS — DIASTOLIC BLOOD PRESSURE: 56 MMHG | SYSTOLIC BLOOD PRESSURE: 97 MMHG

## 2020-04-22 VITALS — SYSTOLIC BLOOD PRESSURE: 130 MMHG | DIASTOLIC BLOOD PRESSURE: 72 MMHG

## 2020-04-22 VITALS — DIASTOLIC BLOOD PRESSURE: 59 MMHG | SYSTOLIC BLOOD PRESSURE: 116 MMHG

## 2020-04-22 VITALS — SYSTOLIC BLOOD PRESSURE: 165 MMHG | DIASTOLIC BLOOD PRESSURE: 99 MMHG

## 2020-04-22 VITALS — DIASTOLIC BLOOD PRESSURE: 73 MMHG | SYSTOLIC BLOOD PRESSURE: 153 MMHG

## 2020-04-22 VITALS — SYSTOLIC BLOOD PRESSURE: 138 MMHG | DIASTOLIC BLOOD PRESSURE: 69 MMHG

## 2020-04-22 VITALS — SYSTOLIC BLOOD PRESSURE: 152 MMHG | DIASTOLIC BLOOD PRESSURE: 88 MMHG

## 2020-04-22 VITALS — DIASTOLIC BLOOD PRESSURE: 91 MMHG | SYSTOLIC BLOOD PRESSURE: 180 MMHG

## 2020-04-22 VITALS — SYSTOLIC BLOOD PRESSURE: 196 MMHG | DIASTOLIC BLOOD PRESSURE: 103 MMHG

## 2020-04-22 VITALS — SYSTOLIC BLOOD PRESSURE: 111 MMHG | DIASTOLIC BLOOD PRESSURE: 63 MMHG

## 2020-04-22 VITALS — SYSTOLIC BLOOD PRESSURE: 160 MMHG | DIASTOLIC BLOOD PRESSURE: 80 MMHG

## 2020-04-22 VITALS — DIASTOLIC BLOOD PRESSURE: 81 MMHG | SYSTOLIC BLOOD PRESSURE: 138 MMHG

## 2020-04-22 VITALS — SYSTOLIC BLOOD PRESSURE: 154 MMHG | DIASTOLIC BLOOD PRESSURE: 79 MMHG

## 2020-04-22 LAB
ALBUMIN SERPL-MCNC: 2.7 G/DL (ref 3.4–5)
ANION GAP SERPL CALC-SCNC: 10 MMOL/L (ref 6–14)
BASOPHILS # BLD AUTO: 0 X10^3/UL (ref 0–0.2)
BASOPHILS NFR BLD: 0 % (ref 0–3)
BUN SERPL-MCNC: 67 MG/DL (ref 7–20)
CALCIUM SERPL-MCNC: 8.8 MG/DL (ref 8.5–10.1)
CHLORIDE SERPL-SCNC: 104 MMOL/L (ref 98–107)
CO2 SERPL-SCNC: 27 MMOL/L (ref 21–32)
CREAT SERPL-MCNC: 1.4 MG/DL (ref 0.6–1)
EOSINOPHIL NFR BLD: 0.1 X10^3/UL (ref 0–0.7)
EOSINOPHIL NFR BLD: 1 % (ref 0–3)
ERYTHROCYTE [DISTWIDTH] IN BLOOD BY AUTOMATED COUNT: 18.8 % (ref 11.5–14.5)
GFR SERPLBLD BASED ON 1.73 SQ M-ARVRAT: 40 ML/MIN
GLUCOSE SERPL-MCNC: 143 MG/DL (ref 70–99)
HCT VFR BLD CALC: 20.6 % (ref 36–47)
HGB BLD-MCNC: 6.7 G/DL (ref 12–15.5)
LYMPHOCYTES # BLD: 1.2 X10^3/UL (ref 1–4.8)
LYMPHOCYTES NFR BLD AUTO: 10 % (ref 24–48)
MCH RBC QN AUTO: 30 PG (ref 25–35)
MCHC RBC AUTO-ENTMCNC: 32 G/DL (ref 31–37)
MCV RBC AUTO: 93 FL (ref 79–100)
MONO #: 0.9 X10^3/UL (ref 0–1.1)
MONOCYTES NFR BLD: 7 % (ref 0–9)
NEUT #: 9.7 X10^3/UL (ref 1.8–7.7)
NEUTROPHILS NFR BLD AUTO: 82 % (ref 31–73)
PHOSPHATE SERPL-MCNC: 3.1 MG/DL (ref 2.6–4.7)
PLATELET # BLD AUTO: 394 X10^3/UL (ref 140–400)
POTASSIUM SERPL-SCNC: 3.5 MMOL/L (ref 3.5–5.1)
RBC # BLD AUTO: 2.22 X10^6/UL (ref 3.5–5.4)
SODIUM SERPL-SCNC: 141 MMOL/L (ref 136–145)
WBC # BLD AUTO: 11.9 X10^3/UL (ref 4–11)

## 2020-04-22 RX ADMIN — HEPARIN SODIUM SCH UNIT: 5000 INJECTION, SOLUTION INTRAVENOUS; SUBCUTANEOUS at 21:24

## 2020-04-22 RX ADMIN — DEXMEDETOMIDINE HYDROCHLORIDE PRN MLS/HR: 100 INJECTION, SOLUTION, CONCENTRATE INTRAVENOUS at 19:45

## 2020-04-22 RX ADMIN — INSULIN LISPRO SCH UNITS: 100 INJECTION, SOLUTION INTRAVENOUS; SUBCUTANEOUS at 06:00

## 2020-04-22 RX ADMIN — HEPARIN SODIUM SCH UNIT: 5000 INJECTION, SOLUTION INTRAVENOUS; SUBCUTANEOUS at 08:43

## 2020-04-22 RX ADMIN — Medication PRN EACH: at 11:51

## 2020-04-22 RX ADMIN — MEROPENEM SCH MLS/HR: 500 INJECTION, POWDER, FOR SOLUTION INTRAVENOUS at 08:28

## 2020-04-22 RX ADMIN — FENTANYL CITRATE PRN MCG: 50 INJECTION INTRAMUSCULAR; INTRAVENOUS at 03:26

## 2020-04-22 RX ADMIN — DEXMEDETOMIDINE HYDROCHLORIDE PRN MLS/HR: 100 INJECTION, SOLUTION, CONCENTRATE INTRAVENOUS at 22:57

## 2020-04-22 RX ADMIN — DAPTOMYCIN SCH MLS/HR: 500 INJECTION, POWDER, LYOPHILIZED, FOR SOLUTION INTRAVENOUS at 12:32

## 2020-04-22 RX ADMIN — PANTOPRAZOLE SODIUM SCH MG: 40 INJECTION, POWDER, FOR SOLUTION INTRAVENOUS at 08:28

## 2020-04-22 RX ADMIN — MEROPENEM SCH MLS/HR: 500 INJECTION, POWDER, FOR SOLUTION INTRAVENOUS at 21:27

## 2020-04-22 RX ADMIN — ONDANSETRON PRN MG: 2 INJECTION INTRAMUSCULAR; INTRAVENOUS at 09:40

## 2020-04-22 RX ADMIN — DEXMEDETOMIDINE HYDROCHLORIDE PRN MLS/HR: 100 INJECTION, SOLUTION, CONCENTRATE INTRAVENOUS at 02:11

## 2020-04-22 RX ADMIN — DEXMEDETOMIDINE HYDROCHLORIDE PRN MLS/HR: 100 INJECTION, SOLUTION, CONCENTRATE INTRAVENOUS at 06:00

## 2020-04-22 RX ADMIN — DEXMEDETOMIDINE HYDROCHLORIDE PRN MLS/HR: 100 INJECTION, SOLUTION, CONCENTRATE INTRAVENOUS at 14:28

## 2020-04-22 RX ADMIN — ACETAMINOPHEN PRN MG: 650 SUPPOSITORY RECTAL at 17:01

## 2020-04-22 RX ADMIN — FENTANYL CITRATE PRN MCG: 50 INJECTION INTRAMUSCULAR; INTRAVENOUS at 06:01

## 2020-04-22 RX ADMIN — INSULIN LISPRO SCH UNITS: 100 INJECTION, SOLUTION INTRAVENOUS; SUBCUTANEOUS at 18:00

## 2020-04-22 RX ADMIN — DEXTROSE SCH MLS/HR: 50 INJECTION, SOLUTION INTRAVENOUS at 08:29

## 2020-04-22 RX ADMIN — INSULIN LISPRO SCH UNITS: 100 INJECTION, SOLUTION INTRAVENOUS; SUBCUTANEOUS at 12:00

## 2020-04-22 RX ADMIN — FENTANYL CITRATE PRN MCG: 50 INJECTION INTRAMUSCULAR; INTRAVENOUS at 14:20

## 2020-04-22 RX ADMIN — DEXMEDETOMIDINE HYDROCHLORIDE PRN MLS/HR: 100 INJECTION, SOLUTION, CONCENTRATE INTRAVENOUS at 11:23

## 2020-04-22 RX ADMIN — DEXMEDETOMIDINE HYDROCHLORIDE PRN MLS/HR: 100 INJECTION, SOLUTION, CONCENTRATE INTRAVENOUS at 08:21

## 2020-04-22 RX ADMIN — DEXMEDETOMIDINE HYDROCHLORIDE PRN MLS/HR: 100 INJECTION, SOLUTION, CONCENTRATE INTRAVENOUS at 17:01

## 2020-04-22 RX ADMIN — ACETAMINOPHEN PRN MG: 650 SUPPOSITORY RECTAL at 03:26

## 2020-04-22 RX ADMIN — FENTANYL CITRATE PRN MCG: 50 INJECTION INTRAMUSCULAR; INTRAVENOUS at 22:51

## 2020-04-22 NOTE — NUR
SW following. Pt still on a vent, trach etc, not ready for discharge. Family working on 
getting guardianship. SW will continue to follow.

## 2020-04-22 NOTE — RAD
CT ABDOMEN PELVIS WO CONTRAST

 

Indication: Pancreatitis. Fever. Anemia.

 

Exposure: One or more of the following individualized dose reduction 

techniques were utilized for this examination:  1. Automated exposure 

control  2. Adjustment of the mA and/or kV according to patient size  3. 

Use of iterative reconstruction technique.

 

Comparison: 4/14/2020

Technique:  No intravenous contrast given. No oral contrast per request.

 

Findings:

Evaluation of solid viscera, bowel and vasculature is compromised by the 

noncontrast technique. There is also image degradation due to the 

patient's arms which are at the side as well as body habitus.

 

Moderate pleural effusions are identified may be slightly less than what 

was seen previously. Mild atelectasis/infiltrate in both lungs.

 

No obvious abnormality of liver, spleen, adrenals or kidneys although 

visualization is very suboptimal due to the above-mentioned reasons. Small

15 mm hepatic lesion is seen, probably also identified previously, cannot 

be accurately characterize but may just represent a small cyst.

 

There is diffuse enlargement and ill-definition of the pancreas with 

hypodensity, compatible with progressive pancreatitis and necrosis. 

Peripancreatic fluid and fatty stranding is again seen. Retroperitoneal 

fluid collection is again identified, measures slightly larger. On the 

right, this now measures about 4.1 cm AP diameter, compared with 3.1 cm AP

diameter on prior study. This measures 49 Hounsfield units, compatible 

with complex content. Ascites within the abdomen and pelvis is again 

identified. NG tube is seen extending into the stomach. The gallbladder is

contracted and thick-walled with a gallstone, also seen previously. The 

aorta demonstrates no obvious aneurysm, mildly calcified. No definite 

pathologic lymph node enlargement is seen.

 

No significant small bowel or large bowel distention. The distal 

transverse, descending and sigmoid colons are difficult to evaluate given 

lack of distention. No definite pneumoperitoneum. Urinary bladder is not 

distended, cannot be evaluated.

 

Vertebral body height and alignment appear stable in appearance. No 

evidence of aggressive bone destruction.

 

Diffuse stranding and edema type density within the subcutaneous, 

retroperitoneal and peritoneal fat, slightly greater, compatible with 

generalized systemic edema or inflammation, or anasarca.

 

IMPRESSION:

1. Pancreas again appears diffusely enlarged and hypodense compatible with

diffuse pancreatitis and pancreatic necrosis.

2. Extensive retroperitoneal fluid collections are again identified, 

slightly larger in size.

3. Moderate to large volume of abdominal and pelvic ascites is again 

identified. Mild generalized increase in fatty inflammation or anasarca.

4. Cholelithiasis with possible cholecystitis.

5. Moderate pleural effusions, may be slightly improved. 

Infiltrate/atelectasis in both lung bases.

6. Small hepatic low-density lesion is too small to characterize, may 

represent a small cyst, likely unchanged.

 

Electronically signed by: Mihir Summers MD (4/22/2020 2:39 PM) [facility] [place]

## 2020-04-22 NOTE — PDOC
SUBJECTIVE


ROS


stable





OBJECTIVE


Vital Signs





Vital Signs








  Date Time  Temp Pulse Resp B/P (MAP) Pulse Ox O2 Delivery O2 Flow Rate FiO2


 


4/22/20 08:08     100 Ventilator  


 


4/22/20 08:00 100.6 94  113/62 (79)    





 100.6       


 


4/22/20 07:00   35     








I & 0











Intake and Output 


 


 4/22/20





 07:00


 


Intake Total 2643 ml


 


Output Total 1600 ml


 


Balance 1043 ml


 


 


 


IV Total 2643 ml


 


Output Urine Total 1600 ml


 


# Bowel Movements 1











PHYSICAL EXAM


Physical Exam


GENERAL: awake,  alert 


HEENT: + NGT, 


NECK:  Trach/vent 


LUNGS: rhonchi 


HEART:  S1, S2, regular 


ABDOMEN: Distended, tender, hypoactive BS, 


: [place] (4/14)


EXTREMITIES: Generalized edema, no cyanosis, 


DERMATOLOGIC:   No generalized rash.  


CENTRAL NERVOUS SYSTEM: Nods appropriately to few questions, 


HDC  (4/14)





DIAGNOSIS/ASSESSMENT


Assessment & Plan


ARF-- ATN,TTS schedule  


UOP improved, Cr stable, No indication for RRT today  


Re-eval in am ,   Monitor for renal recovery  


 


Anemia-  Currently on YOLI 


Hgb dropped, per Primary   /GI  





Recurrent fever  





Anasarca due to 3rd spacing  stable  





Hyperkalemia- resolved  





AC Pancreatitis, early developing necrosis


No intervention per GS  


CT SCAN 4/14 -  Diffuse pancreatic necrosi,extensive retroperitoneal fluid and 

inflammation, Cholelithiasis.


    Moderate to large volume ascites with little change.


 





Ascites - post paracentesis on 4/15-  4300 cc of thin, light brown fluid was 

removed





  Cholelithiasis with thickening of the gallbladder wall.





Acute hypoxic resp failure ,bilateral pleural effusion


Has Trach, On Vent  


 


 Persistent pleural effusions and atelectasis in the lung bases.





hypocalcemia - stable 





HTN- Hypotensive , pressors as indicated





COVID-19 neg, 3/26





COMMENT/RELEVANT DATA


Meds





Current Medications








 Medications


  (Trade)  Dose


 Ordered  Sig/Yee  Start Time


 Stop Time Status Last Admin


Dose Admin


 


 Acetaminophen


  (Tylenol Supp)  650 mg  PRN Q6HRS  PRN  3/24/20 10:30


    4/22/20 03:26


650 MG


 


 Acetaminophen


  (Tylenol)  650 mg  PRN Q6HRS  PRN  3/21/20 03:36


    4/16/20 19:56


650 MG


 


 Albumin Human  200 ml @ 


 200 mls/hr  1X PRN  PRN  4/21/20 08:00


 4/21/20 13:59 DC 4/21/20 08:40


200 MLS/HR


 


 Albuterol Sulfate


  (Ventolin Neb


 Soln)  2.5 mg  1X  ONCE  3/17/20 22:30


 3/17/20 22:31 DC 3/18/20 00:56


2.5 MG


 


 Alteplase,


 Recombinant


  (Cathflo For


 Central Catheter


 Clearance)  1 mg  1X  ONCE  3/31/20 22:00


 3/31/20 22:01 DC 3/31/20 22:05


1 MG


 


 Amino Acids/


 Glycerin/


 Electrolytes  1,000 ml @ 


 75 mls/hr  [id number]  4/20/20 21:15


   UNV  





 


 Artificial Tears


  (Artificial


 Tears)  1 drop  PRN Q1HR  PRN  3/23/20 08:15


     





 


 Atenolol


  (Tenormin)  100 mg  DAILY  3/17/20 09:00


 3/16/20 20:08 DC  





 


 Atropine Sulfate


  (ATROPINE 0.5mg


 SYRINGE)  0.5 mg  PRN Q5MIN  PRN  4/2/20 08:15


     





 


 Benzocaine


  (Hurricaine One)  1 spray  1X  ONCE  3/20/20 14:30


 3/20/20 14:31 DC 3/20/20 16:38


1 SPRAY


 


 Bupivacaine HCl/


 Epinephrine Bitart


  (Sensorcain-Epi


 0.5%-1:521578 Mpf)  30 ml  STK-MED ONCE  4/6/20 11:00


 4/6/20 11:01 DC 4/6/20 11:44


1 ML


 


 Calcium Carbonate/


 Glycine


  (Tums)  500 mg  PRN [id number]  PRN  3/18/20 17:45


     





 


 Calcium Chloride


 1000 mg/Sodium


 Chloride  110 ml @ 


 220 mls/hr  1X  ONCE  3/17/20 22:30


 3/17/20 22:59 DC 3/17/20 22:11


220 MLS/HR


 


 Calcium Chloride


 3000 mg/Sodium


 Chloride  1,030 ml @ 


 50 mls/hr  [id number]  3/19/20 08:00


 3/21/20 15:23 DC 3/21/20 02:17


50 MLS/HR


 


 Calcium Gluconate


  (Calcium


 Gluconate)  2,000 mg  1X  ONCE  3/19/20 02:15


 3/19/20 02:16 DC 3/19/20 02:19


2,000 MG


 


 Calcium Gluconate


 1000 mg/Sodium


 Chloride  110 ml @ 


 220 mls/hr  1X  ONCE  3/18/20 03:30


 3/18/20 03:59 DC 3/18/20 03:21


220 MLS/HR


 


 Calcium Gluconate


 2000 mg/Sodium


 Chloride  120 ml @ 


 220 mls/hr  1X  ONCE  3/18/20 07:30


 3/18/20 08:02 DC 3/18/20 09:05


220 MLS/HR


 


 Cefepime HCl


  (Maxipime)  2 gm  Q12HR  3/25/20 09:00


 4/8/20 09:58 DC 4/7/20 20:56


2 GM


 


 Cellulose


  (Surgicel


 Fibrillar 1x2)  1 each  STK-MED ONCE  4/6/20 11:00


 4/6/20 11:01 DC  





 


 Cellulose


  (Surgicel


 Hemostat 4x8)  1 each  STK-MED ONCE  4/6/20 11:55


 4/6/20 11:56 DC 4/6/20 11:44


1 EACH


 


 Daptomycin 430 mg/


 Sodium Chloride  50 ml @ 


 100 mls/hr  Q48H  4/23/20 09:00


     





 


 Daptomycin 500 mg/


 Sodium Chloride  50 ml @ 


 100 mls/hr  Q48H  3/25/20 08:30


 4/10/20 10:07 DC 4/10/20 09:57


100 MLS/HR


 


 Dexmedetomidine


 HCl 400 mcg/


 Sodium Chloride  100 ml @ 0


 mls/hr  CONT  PRN  4/2/20 08:15


    4/22/20 08:21


30.5 MLS/HR


 


 Dextrose


  (Dextrose


 50%-Water Syringe)  12.5 gm  PRN Q15MIN  PRN  3/16/20 09:30


     





 


 Digoxin


  (Lanoxin)  125 mcg  1X  ONCE  3/19/20 18:00


 3/19/20 18:01 DC 3/19/20 17:10


125 MCG


 


 Diphenhydramine


 HCl


  (Benadryl)  25 mg  1X PRN  PRN  4/13/20 09:45


 4/14/20 09:44 DC  





 


 Enoxaparin Sodium


  (Lovenox 100mg


 Syringe)  100 mg  Q12HR  4/21/20 21:00


   UNV  





 


 Etomidate


  (Amidate)  8 mg  1X  ONCE  3/23/20 08:30


 3/23/20 08:31 DC 3/23/20 08:33


8 MG


 


 Fentanyl Citrate


  (Fentanyl 2ml


 Vial)  25 mcg  PRN Q2HR  PRN  4/20/20 21:00


     





 


 Furosemide


  (Lasix)  40 mg  1X  ONCE  4/13/20 14:30


 4/13/20 14:31 DC 4/13/20 14:39


40 MG


 


 Heparin Sodium


  (Porcine)


  (Hep Lock Adult)  500 unit  STK-MED ONCE  4/7/20 09:29


 4/7/20 09:30 DC  





 


 Heparin Sodium


  (Porcine)


  (Heparin Sodium)  5,000 unit  Q12HR  4/3/20 21:00


    4/22/20 08:43


5,000 UNIT


 


 Hydromorphone HCl


  (Dilaudid)  1 mg  PRN Q3HRS  PRN  3/17/20 12:00


 3/31/20 00:25 DC 3/23/20 05:13


1 MG


 


 Info


  (CONTRAST GIVEN


 -- Rx MONITORING)  1 each  PRN DAILY  PRN  3/30/20 11:45


 4/1/20 11:44 DC  





 


 Info


  (Icu Electrolyte


 Protocol)  1 ea  CONT PRN  PRN  3/29/20 13:15


     





 


 Info


  (PHARMACY


 MONITORING -- do


 not chart)  1 each  PRN DAILY  PRN  4/21/20 08:00


   UNV  





 


 Info


  (Tpn Per


 Pharmacy)  1 each  PRN DAILY  PRN  3/18/20 12:30


   UNV  





 


 Insulin Human


 Lispro


  (HumaLOG)  0-9 UNITS  Q6HRS  3/16/20 09:30


    4/21/20 00:06


4 UNITS


 


 Insulin Human


 Regular


  (HumuLIN R VIAL)  5 unit  1X  ONCE  3/17/20 22:30


 3/17/20 22:31 DC 3/17/20 22:14


5 UNIT


 


 Iohexol


  (Omnipaque 240


 Mg/ml)  30 ml  1X  ONCE  3/30/20 11:30


 3/30/20 11:33 DC 3/30/20 11:30


30 ML


 


 Iohexol


  (Omnipaque 300


 Mg/ml)  60 ml  1X  ONCE  3/17/20 17:00


 3/17/20 17:01 DC 3/17/20 17:20


60 ML


 


 Iohexol


  (Omnipaque 350


 Mg/ml)  90 ml  1X  ONCE  3/16/20 03:30


 3/16/20 03:31 DC 3/16/20 03:25


90 ML


 


 Ketorolac


 Tromethamine


  (Toradol 30mg


 Vial)  30 mg  1X  ONCE  3/16/20 03:00


 3/16/20 03:01 DC 3/16/20 02:54


30 MG


 


 Lidocaine HCl


  (Buffered


 Lidocaine 1%)  6 ml  1X  ONCE  4/15/20 13:30


 4/15/20 13:31 DC 4/15/20 13:45


9 ML


 


 Lidocaine HCl


  (Glydo


  (Lidocaine) Jelly)  1 thomas  1X  ONCE  3/20/20 14:30


 3/20/20 14:31 DC 3/20/20 16:38


1 THOMAS


 


 Lidocaine HCl


  (Xylocaine-Mpf


 1% 2ml Vial)  2 ml  PRN 1X  PRN  4/6/20 07:00


 4/7/20 06:59 DC  





 


 Linezolid/Dextrose  300 ml @ 


 300 mls/hr  Q12HR  4/10/20 11:00


 4/21/20 08:10 DC 4/20/20 20:40


300 MLS/HR


 


 Lorazepam


  (Ativan Inj)  1 mg  PRN Q4HRS  PRN  3/19/20 09:00


 4/17/20 09:19 DC 4/17/20 03:51


1 MG


 


 Magnesium Sulfate  50 ml @ 25


 mls/hr  PRN DAILY  PRN  4/5/20 09:15


    4/20/20 17:27


25 MLS/HR


 


 Meropenem 1 gm/


 Sodium Chloride  100 ml @ 


 200 mls/hr  Q8HRS  3/17/20 20:00


 3/18/20 08:48 DC 3/18/20 05:45


200 MLS/HR


 


 Meropenem 500 mg/


 Sodium Chloride  50 ml @ 


 100 mls/hr  Q12H  4/8/20 10:00


    4/22/20 08:28


100 MLS/HR


 


 Metoprolol


 Tartrate


  (Lopressor Vial)  5 mg  Q6HRS  3/17/20 10:15


 3/28/20 08:48 DC 3/26/20 00:12


5 MG


 


 Metronidazole  100 ml @ 


 100 mls/hr  Q8HRS  4/14/20 10:00


 4/21/20 08:10 DC 4/21/20 06:04


100 MLS/HR


 


 Micafungin Sodium


 100 mg/Dextrose  100 ml @ 


 100 mls/hr  Q24H  3/23/20 09:00


    4/22/20 08:29


100 MLS/HR


 


 Midazolam HCl


  (Versed)  5 mg  1X  ONCE  3/23/20 08:30


 3/23/20 08:31 DC  





 


 Midazolam HCl 100


 mg/Sodium Chloride  100 ml @ 7


 mls/hr  CONT  PRN  3/28/20 16:00


    4/8/20 15:35


7 MLS/HR


 


 Midazolam HCl 50


 mg/Sodium Chloride  50 ml @ 0


 mls/hr  CONT  PRN  3/23/20 08:15


 3/28/20 15:59 DC 3/26/20 22:39


7 MLS/HR


 


 Morphine Sulfate


  (Morphine


 Sulfate)  2 mg  PRN Q2HR  PRN  3/16/20 05:00


 3/17/20 14:15 DC 3/17/20 12:26


2 MG


 


 Multi-Ingred


 Cream/Lotion/Oil/


 Oint


  (Artificial


 Tears Eye


 Ointment)  1 thomas  PRN Q1HR  PRN  3/25/20 17:30


    4/13/20 08:19


1 THOMAS


 


 Norepinephrine


 Bitartrate 8 mg/


 Dextrose  258 ml @ 


 17.299 mls/


 hr  CONT  PRN  3/17/20 15:30


 4/17/20 09:19 DC 4/14/20 12:48


20.9 MLS/HR


 


 Ondansetron HCl


  (Zofran)  4 mg  PRN Q6HRS  PRN  4/6/20 07:00


 4/7/20 06:59 DC  





 


 Pantoprazole


 Sodium


  (PROTONIX VIAL


 for IV PUSH)  40 mg  DAILYAC  3/16/20 11:30


    4/22/20 08:28


40 MG


 


 Piperacillin Sod/


 Tazobactam Sod


 4.5 gm/Sodium


 Chloride  100 ml @ 


 200 mls/hr  1X  ONCE  3/16/20 06:00


 3/16/20 06:29 DC 3/16/20 05:44


200 MLS/HR


 


 Potassium


 Chloride 15 meq/


 Bicarbonate


 Dialysis Soln w/


 out KCl  5,007.5 ml


  @ 1,000 mls/


 hr  Q5H1M  3/29/20 20:00


 4/2/20 13:08 DC 4/1/20 18:14


1,000 MLS/HR


 


 Potassium


 Chloride 20 meq/


 Bicarbonate


 Dialysis Soln w/


 out KCl  5,010 ml @ 


 1,000 mls/hr  Q5H1M  3/25/20 16:00


 3/29/20 19:59 DC 3/29/20 14:54


1,000 MLS/HR


 


 Potassium


 Chloride/Water  100 ml @ 


 100 mls/hr  1X  ONCE  4/18/20 14:45


 4/18/20 15:44 DC 4/18/20 17:28


100 MLS/HR


 


 Potassium


 Phosphate 20 mmol/


 Sodium Chloride  106.6667


 ml @ 


 51.667 m...  1X  ONCE  3/25/20 13:00


 3/25/20 15:03 DC 3/25/20 12:51


51.667 MLS/HR


 


 Prochlorperazine


 Edisylate


  (Compazine)  5 mg  PACU PRN  PRN  4/6/20 07:00


 4/7/20 06:59 DC  





 


 Propofol  20 ml @ As


 Directed  STK-MED ONCE  4/6/20 11:07


 4/6/20 11:07 DC  





 


 Ringer's Solution  1,000 ml @ 


 30 mls/hr  Q24H  4/6/20 07:00


 4/6/20 18:59 DC  





 


 Sevoflurane


  (Ultane)  60 ml  STK-MED ONCE  4/6/20 12:46


 4/6/20 12:46 DC  





 


 Sodium


 Bicarbonate 50


 meq/Sodium


 Chloride  1,050 ml @ 


 75 mls/hr  Q14H  3/18/20 07:30


 3/23/20 10:28 DC 3/22/20 21:10


75 MLS/HR


 


 Sodium Chloride


  (Normal Saline


 Flush)  20 ml  PRN Q5MIN  PRN  4/14/20 15:00


     





 


 Sodium Chloride


 90 meq/Calcium


 Gluconate 10 meq/


 Multivitamins 10


 ml/Chromium/


 Copper/Manganese/


 Seleni/Zn 0.5 ml/


 Total Parenteral


 Nutrition/Amino


 Acids/Dextrose/


 Fat Emulsion


 Intravenous  1,512 ml @ 


 63 mls/hr  TPN  CONT  3/18/20 22:00


 3/19/20 21:59 DC 3/18/20 22:06


63 MLS/HR


 


 Sodium Chloride


 90 meq/Calcium


 Gluconate 10 meq/


 Multivitamins 10


 ml/Chromium/


 Copper/Manganese/


 Seleni/Zn 1 ml/


 Total Parenteral


 Nutrition/Amino


 Acids/Dextrose/


 Fat Emulsion


 Intravenous  55.005 ml


  @ 2.292


 mls/hr  TPN  CONT  3/18/20 22:00


 3/18/20 12:33 DC  





 


 Sodium Chloride


 90 meq/Magnesium


 Sulfate 10 meq/


 Calcium Gluconate


 20 meq/


 Multivitamins 10


 ml/Chromium/


 Copper/Manganese/


 Seleni/Zn 0.5 ml/


 Total Parenteral


 Nutrition/Amino


 Acids/Dextrose/


 Fat Emulsion


 Intravenous  1,512 ml @ 


 63 mls/hr  TPN  CONT  3/19/20 22:00


 3/20/20 21:59 DC 3/19/20 22:25


63 MLS/HR


 


 Sodium Chloride


 90 meq/Magnesium


 Sulfate 12 meq/


 Calcium Gluconate


 15 meq/


 Multivitamins 10


 ml/Chromium/


 Copper/Manganese/


 Seleni/Zn 0.5 ml/


 Insulin Human


 Regular 25 unit/


 Total Parenteral


 Nutrition/Amino


 Acids/Dextrose/


 Fat Emulsion


 Intravenous  1,400 ml @ 


 58.333 mls/


 hr  TPN  CONT  4/8/20 22:00


 4/9/20 21:59 DC 4/8/20 21:41


58.333 MLS/HR


 


 Sodium Chloride


 90 meq/Potassium


 Chloride 15 meq/


 Magnesium Sulfate


 12 meq/Calcium


 Gluconate 15 meq/


 Multivitamins 10


 ml/Chromium/


 Copper/Manganese/


 Seleni/Zn 0.5 ml/


 Insulin Human


 Regular 25 unit/


 Total Parenteral


 Nutrition/Amino


 Acids/Dextrose/


 Fat Emulsion


 Intravenous  1,400 ml @ 


 58.333 mls/


 hr  TPN  CONT  4/7/20 22:00


 4/8/20 21:59 DC 4/7/20 22:13


58.333 MLS/HR


 


 Sodium Chloride


 90 meq/Potassium


 Chloride 15 meq/


 Potassium


 Phosphate 10 mmol/


 Magnesium Sulfate


 8 meq/Calcium


 Gluconate 15 meq/


 Multivitamins 10


 ml/Chromium/


 Copper/Manganese/


 Seleni/Zn 0.5 ml/


 Insulin Human


 Regular 25 unit/


 Total Parenteral


 Nutrition/Amino


 Acids/Dextrose/


 Fat Emulsion


 Intravenous  1,400 ml @ 


 58.333 mls/


 hr  TPN  CONT  4/5/20 22:00


 4/6/20 21:59 DC 4/5/20 21:20


58.333 MLS/HR


 


 Sodium Chloride


 90 meq/Potassium


 Chloride 15 meq/


 Potassium


 Phosphate 10 mmol/


 Magnesium Sulfate


 10 meq/Calcium


 Gluconate 20 meq/


 Multivitamins 10


 ml/Chromium/


 Copper/Manganese/


 Seleni/Zn 0.5 ml/


 Total Parenteral


 Nutrition/Amino


 Acids/Dextrose/


 Fat Emulsion


 Intravenous  1,400 ml @ 


 58.333 mls/


 hr  TPN  CONT  3/23/20 22:00


 3/24/20 21:59 DC 3/23/20 21:42


58.333 MLS/HR


 


 Sodium Chloride


 90 meq/Potassium


 Chloride 15 meq/


 Potassium


 Phosphate 10 mmol/


 Magnesium Sulfate


 12 meq/Calcium


 Gluconate 15 meq/


 Multivitamins 10


 ml/Chromium/


 Copper/Manganese/


 Seleni/Zn 0.5 ml/


 Insulin Human


 Regular 25 unit/


 Total Parenteral


 Nutrition/Amino


 Acids/Dextrose/


 Fat Emulsion


 Intravenous  1,400 ml @ 


 58.333 mls/


 hr  TPN  CONT  4/6/20 22:00


 4/7/20 21:59 DC 4/6/20 22:24


58.333 MLS/HR


 


 Sodium Chloride


 90 meq/Potassium


 Chloride 15 meq/


 Potassium


 Phosphate 15 mmol/


 Magnesium Sulfate


 10 meq/Calcium


 Gluconate 15 meq/


 Multivitamins 10


 ml/Chromium/


 Copper/Manganese/


 Seleni/Zn 0.5 ml/


 Total Parenteral


 Nutrition/Amino


 Acids/Dextrose/


 Fat Emulsion


 Intravenous  1,400 ml @ 


 58.333 mls/


 hr  TPN  CONT  3/24/20 22:00


 3/25/20 21:59 DC 3/24/20 22:17


58.333 MLS/HR


 


 Sodium Chloride


 90 meq/Potassium


 Chloride 15 meq/


 Potassium


 Phosphate 15 mmol/


 Magnesium Sulfate


 10 meq/Calcium


 Gluconate 20 meq/


 Multivitamins 10


 ml/Chromium/


 Copper/Manganese/


 Seleni/Zn 0.5 ml/


 Total Parenteral


 Nutrition/Amino


 Acids/Dextrose/


 Fat Emulsion


 Intravenous  1,200 ml @ 


 50 mls/hr  TPN  CONT  3/22/20 22:00


 3/22/20 14:17 DC  





 


 Sodium Chloride


 90 meq/Potassium


 Chloride 15 meq/


 Potassium


 Phosphate 18 mmol/


 Magnesium Sulfate


 8 meq/Calcium


 Gluconate 15 meq/


 Multivitamins 10


 ml/Chromium/


 Copper/Manganese/


 Seleni/Zn 0.5 ml/


 Insulin Human


 Regular 10 unit/


 Total Parenteral


 Nutrition/Amino


 Acids/Dextrose/


 Fat Emulsion


 Intravenous  1,400 ml @ 


 58.333 mls/


 hr  TPN  CONT  3/27/20 22:00


 3/28/20 21:59 DC 3/27/20 21:43


58.333 MLS/HR


 


 Sodium Chloride


 90 meq/Potassium


 Chloride 15 meq/


 Potassium


 Phosphate 18 mmol/


 Magnesium Sulfate


 8 meq/Calcium


 Gluconate 15 meq/


 Multivitamins 10


 ml/Chromium/


 Copper/Manganese/


 Seleni/Zn 0.5 ml/


 Insulin Human


 Regular 15 unit/


 Total Parenteral


 Nutrition/Amino


 Acids/Dextrose/


 Fat Emulsion


 Intravenous  1,400 ml @ 


 58.333 mls/


 hr  TPN  CONT  3/30/20 22:00


 3/31/20 21:59 DC 3/30/20 21:47


58.333 MLS/HR


 


 Sodium Chloride


 90 meq/Potassium


 Chloride 15 meq/


 Potassium


 Phosphate 18 mmol/


 Magnesium Sulfate


 8 meq/Calcium


 Gluconate 15 meq/


 Multivitamins 10


 ml/Chromium/


 Copper/Manganese/


 Seleni/Zn 0.5 ml/


 Insulin Human


 Regular 20 unit/


 Total Parenteral


 Nutrition/Amino


 Acids/Dextrose/


 Fat Emulsion


 Intravenous  1,400 ml @ 


 58.333 mls/


 hr  TPN  CONT  4/2/20 22:00


 4/3/20 21:59 DC 4/2/20 22:45


58.333 MLS/HR


 


 Sodium Chloride


 90 meq/Potassium


 Chloride 15 meq/


 Potassium


 Phosphate 18 mmol/


 Magnesium Sulfate


 8 meq/Calcium


 Gluconate 15 meq/


 Multivitamins 10


 ml/Chromium/


 Copper/Manganese/


 Seleni/Zn 0.5 ml/


 Total Parenteral


 Nutrition/Amino


 Acids/Dextrose/


 Fat Emulsion


 Intravenous  1,400 ml @ 


 58.333 mls/


 hr  TPN  CONT  3/26/20 22:00


 3/27/20 21:59 DC 3/26/20 22:00


58.333 MLS/HR


 


 Sodium Chloride


 90 meq/Potassium


 Phosphate 15 mmol/


 Magnesium Sulfate


 12 meq/Calcium


 Gluconate 15 meq/


 Multivitamins 10


 ml/Chromium/


 Copper/Manganese/


 Seleni/Zn 0.5 ml/


 Insulin Human


 Regular 30 unit/


 Total Parenteral


 Nutrition/Amino


 Acids/Dextrose/


 Fat Emulsion


 Intravenous  1,400 ml @ 


 58.333 mls/


 hr  TPN  CONT  4/10/20 22:00


 4/11/20 21:59 DC 4/10/20 21:49


58.333 MLS/HR


 


 Sodium Chloride


 90 meq/Potassium


 Phosphate 15 mmol/


 Magnesium Sulfate


 12 meq/Calcium


 Gluconate 15 meq/


 Multivitamins 10


 ml/Chromium/


 Copper/Manganese/


 Seleni/Zn 0.5 ml/


 Insulin Human


 Regular 40 unit/


 Total Parenteral


 Nutrition/Amino


 Acids/Dextrose/


 Fat Emulsion


 Intravenous  1,400 ml @ 


 58.333 mls/


 hr  TPN  CONT  4/11/20 22:00


 4/12/20 21:59 DC 4/11/20 21:21


58.333 MLS/HR


 


 Sodium Chloride


 90 meq/Potassium


 Phosphate 19 mmol/


 Magnesium Sulfate


 12 meq/Calcium


 Gluconate 15 meq/


 Multivitamins 10


 ml/Chromium/


 Copper/Manganese/


 Seleni/Zn 0.5 ml/


 Insulin Human


 Regular 40 unit/


 Total Parenteral


 Nutrition/Amino


 Acids/Dextrose/


 Fat Emulsion


 Intravenous  1,400 ml @ 


 58.333 mls/


 hr  TPN  CONT  4/12/20 22:00


 4/13/20 21:59 DC 4/12/20 21:54


58.333 MLS/HR


 


 Sodium Chloride


 90 meq/Potassium


 Phosphate 5 mmol/


 Magnesium Sulfate


 12 meq/Calcium


 Gluconate 15 meq/


 Multivitamins 10


 ml/Chromium/


 Copper/Manganese/


 Seleni/Zn 0.5 ml/


 Insulin Human


 Regular 30 unit/


 Total Parenteral


 Nutrition/Amino


 Acids/Dextrose/


 Fat Emulsion


 Intravenous  1,400 ml @ 


 58.333 mls/


 hr  TPN  CONT  4/9/20 22:00


 4/10/20 21:59 DC 4/9/20 22:08


58.333 MLS/HR


 


 Sodium Chloride


 100 meq/Potassium


 Chloride 40 meq/


 Magnesium Sulfate


 15 meq/Calcium


 Gluconate 15 meq/


 Multivitamins 10


 ml/Chromium/


 Copper/Manganese/


 Seleni/Zn 0.5 ml/


 Insulin Human


 Regular 35 unit/


 Total Parenteral


 Nutrition/Amino


 Acids/Dextrose/


 Fat Emulsion


 Intravenous  1,400 ml @ 


 58.333 mls/


 hr  TPN  CONT  4/19/20 22:00


 4/20/20 21:59 DC 4/19/20 22:46


58.333 MLS/HR


 


 Sodium Chloride


 100 meq/Potassium


 Chloride 40 meq/


 Magnesium Sulfate


 20 meq/Calcium


 Gluconate 15 meq/


 Multivitamins 10


 ml/Chromium/


 Copper/Manganese/


 Seleni/Zn 0.5 ml/


 Insulin Human


 Regular 35 unit/


 Total Parenteral


 Nutrition/Amino


 Acids/Dextrose/


 Fat Emulsion


 Intravenous  1,400 ml @ 


 58.333 mls/


 hr  TPN  CONT  4/21/20 22:00


 4/22/20 21:59  4/21/20 21:26


58.333 MLS/HR


 


 Sodium Chloride


 100 meq/Potassium


 Phosphate 10 mmol/


 Magnesium Sulfate


 12 meq/Calcium


 Gluconate 15 meq/


 Multivitamins 10


 ml/Chromium/


 Copper/Manganese/


 Seleni/Zn 0.5 ml/


 Insulin Human


 Regular 35 unit/


 Potassium


 Chloride 20 meq/


 Total Parenteral


 Nutrition/Amino


 Acids/Dextrose/


 Fat Emulsion


 Intravenous  1,400 ml @ 


 58.333 mls/


 hr  TPN  CONT  4/16/20 22:00


 4/17/20 21:59 DC 4/16/20 22:10


58.333 MLS/HR


 


 Sodium Chloride


 100 meq/Potassium


 Phosphate 19 mmol/


 Magnesium Sulfate


 12 meq/Calcium


 Gluconate 15 meq/


 Multivitamins 10


 ml/Chromium/


 Copper/Manganese/


 Seleni/Zn 0.5 ml/


 Insulin Human


 Regular 40 unit/


 Potassium


 Chloride 20 meq/


 Total Parenteral


 Nutrition/Amino


 Acids/Dextrose/


 Fat Emulsion


 Intravenous  1,400 ml @ 


 58.333 mls/


 hr  TPN  CONT  4/15/20 22:00


 4/16/20 21:59 DC 4/15/20 21:20


58.333 MLS/HR


 


 Sodium Chloride


 100 meq/Potassium


 Phosphate 5 mmol/


 Magnesium Sulfate


 12 meq/Calcium


 Gluconate 15 meq/


 Multivitamins 10


 ml/Chromium/


 Copper/Manganese/


 Seleni/Zn 0.5 ml/


 Insulin Human


 Regular 35 unit/


 Potassium


 Chloride 20 meq/


 Total Parenteral


 Nutrition/Amino


 Acids/Dextrose/


 Fat Emulsion


 Intravenous  1,400 ml @ 


 58.333 mls/


 hr  TPN  CONT  4/17/20 22:00


 4/18/20 21:59 DC 4/17/20 22:59


58.333 MLS/HR


 


 Succinylcholine


 Chloride


  (Anectine)  120 mg  1X  ONCE  3/23/20 08:30


 3/23/20 08:31 DC 3/23/20 08:34


120 MG








Lab





Laboratory Tests








Test


 4/21/20


12:48 4/21/20


23:33 4/22/20


05:15 4/22/20


05:30


 


Glucose (Fingerstick)


 157 mg/dL


(70-99) 145 mg/dL


(70-99) 


 





 


Magnesium Level


 


 


 2.1 mg/dL


(1.8-2.4) 





 


White Blood Count


 


 


 


 11.9 x10^3/uL


(4.0-11.0)


 


Red Blood Count


 


 


 


 2.22 x10^6/uL


(3.50-5.40)


 


Hemoglobin


 


 


 


 6.7 g/dL


(12.0-15.5)


 


Hematocrit


 


 


 


 20.6 %


(36.0-47.0)


 


Mean Corpuscular Volume    93 fL () 


 


Mean Corpuscular Hemoglobin    30 pg (25-35) 


 


Mean Corpuscular Hemoglobin


Concent 


 


 


 32 g/dL


(31-37)


 


Red Cell Distribution Width


 


 


 


 18.8 %


(11.5-14.5)


 


Platelet Count


 


 


 


 394 x10^3/uL


(140-400)


 


Neutrophils (%) (Auto)    82 % (31-73) 


 


Lymphocytes (%) (Auto)    10 % (24-48) 


 


Monocytes (%) (Auto)    7 % (0-9) 


 


Eosinophils (%) (Auto)    1 % (0-3) 


 


Basophils (%) (Auto)    0 % (0-3) 


 


Neutrophils # (Auto)


 


 


 


 9.7 x10^3/uL


(1.8-7.7)


 


Lymphocytes # (Auto)


 


 


 


 1.2 x10^3/uL


(1.0-4.8)


 


Monocytes # (Auto)


 


 


 


 0.9 x10^3/uL


(0.0-1.1)


 


Eosinophils # (Auto)


 


 


 


 0.1 x10^3/uL


(0.0-0.7)


 


Basophils # (Auto)


 


 


 


 0.0 x10^3/uL


(0.0-0.2)


 


Sodium Level


 


 


 


 141 mmol/L


(136-145)


 


Potassium Level


 


 


 


 3.5 mmol/L


(3.5-5.1)


 


Chloride Level


 


 


 


 104 mmol/L


()


 


Carbon Dioxide Level


 


 


 


 27 mmol/L


(21-32)


 


Anion Gap    10 (6-14) 


 


Blood Urea Nitrogen


 


 


 


 67 mg/dL


(7-20)


 


Creatinine


 


 


 


 1.4 mg/dL


(0.6-1.0)


 


Estimated GFR


(Cockcroft-Gault) 


 


 


 40.0 





 


Glucose Level


 


 


 


 143 mg/dL


(70-99)


 


Calcium Level


 


 


 


 8.8 mg/dL


(8.5-10.1)


 


Phosphorus Level


 


 


 


 3.1 mg/dL


(2.6-4.7)


 


Albumin


 


 


 


 2.7 g/dL


(3.4-5.0)


 


Test


 4/22/20


05:59 


 


 





 


Glucose (Fingerstick)


 135 mg/dL


(70-99) 


 


 











Results


All relevant outside records, renal labs, imaging studies, telemetry/EKG's were 

reviewed.











KIMBERLY MYERS MD                Apr 22, 2020 09:28

## 2020-04-22 NOTE — PDOC
SURGICAL PROGRESS NOTE


Subjective


Pt awake on vent/trach, does note abd pain


Vital Signs





Vital Signs








  Date Time  Temp Pulse Resp B/P (MAP) Pulse Ox O2 Delivery O2 Flow Rate FiO2


 


4/22/20 14:20   44  94   


 


4/22/20 14:00  171  196/103 (134)  Ventilator  


 


4/22/20 13:11 100.0       





 100.0       








I&O











Intake and Output 


 


 4/22/20





 07:00


 


Intake Total 2643 ml


 


Output Total 1600 ml


 


Balance 1043 ml


 


 


 


IV Total 2643 ml


 


Output Urine Total 1600 ml


 


# Bowel Movements 1








PATIENT HAS A VILLASENOR:  Yes (accurate i and os)


General:  Alert, Cooperative, mild distress


Abdomen:  Soft, Other (some diffuse TTP)


Labs





Laboratory Tests








Test


 4/20/20


18:27 4/21/20


00:01 4/21/20


05:45 4/21/20


06:04


 


Glucose (Fingerstick)


 145 mg/dL


(70-99) 170 mg/dL


(70-99) 


 136 mg/dL


(70-99)


 


White Blood Count


 


 


 16.3 x10^3/uL


(4.0-11.0) 





 


Red Blood Count


 


 


 2.68 x10^6/uL


(3.50-5.40) 





 


Hemoglobin


 


 


 8.2 g/dL


(12.0-15.5) 





 


Hematocrit


 


 


 25.5 %


(36.0-47.0) 





 


Mean Corpuscular Volume   95 fL ()  


 


Mean Corpuscular Hemoglobin   31 pg (25-35)  


 


Mean Corpuscular Hemoglobin


Concent 


 


 32 g/dL


(31-37) 





 


Red Cell Distribution Width


 


 


 18.8 %


(11.5-14.5) 





 


Platelet Count


 


 


 521 x10^3/uL


(140-400) 





 


Neutrophils (%) (Auto)   79 % (31-73)  


 


Lymphocytes (%) (Auto)   13 % (24-48)  


 


Monocytes (%) (Auto)   8 % (0-9)  


 


Eosinophils (%) (Auto)   0 % (0-3)  


 


Basophils (%) (Auto)   1 % (0-3)  


 


Neutrophils # (Auto)


 


 


 12.8 x10^3/uL


(1.8-7.7) 





 


Lymphocytes # (Auto)


 


 


 2.1 x10^3/uL


(1.0-4.8) 





 


Monocytes # (Auto)


 


 


 1.2 x10^3/uL


(0.0-1.1) 





 


Eosinophils # (Auto)


 


 


 0.0 x10^3/uL


(0.0-0.7) 





 


Basophils # (Auto)


 


 


 0.1 x10^3/uL


(0.0-0.2) 





 


Segmented Neutrophils %   61 % (35-66)  


 


Band Neutrophils %   26 % (0-9)  


 


Lymphocytes %   6 % (24-48)  


 


Monocytes %   7 % (0-10)  


 


Toxic Granulation   Slight  


 


Platelet Estimate


 


 


 Increased


(ADEQUATE) 





 


Hypochromasia   Slight  


 


Anisocytosis   Slight  


 


Microcytosis   Slight  


 


Sodium Level


 


 


 142 mmol/L


(136-145) 





 


Potassium Level


 


 


 3.7 mmol/L


(3.5-5.1) 





 


Chloride Level


 


 


 105 mmol/L


() 





 


Carbon Dioxide Level


 


 


 23 mmol/L


(21-32) 





 


Anion Gap   14 (6-14)  


 


Blood Urea Nitrogen


 


 


 89 mg/dL


(7-20) 





 


Creatinine


 


 


 1.6 mg/dL


(0.6-1.0) 





 


Estimated GFR


(Cockcroft-Gault) 


 


 34.3 


 





 


Glucose Level


 


 


 141 mg/dL


(70-99) 





 


Calcium Level


 


 


 8.9 mg/dL


(8.5-10.1) 





 


Magnesium Level


 


 


 2.2 mg/dL


(1.8-2.4) 





 


Test


 4/21/20


12:48 4/21/20


23:33 4/22/20


05:15 4/22/20


05:30


 


Glucose (Fingerstick)


 157 mg/dL


(70-99) 145 mg/dL


(70-99) 


 





 


Magnesium Level


 


 


 2.1 mg/dL


(1.8-2.4) 





 


White Blood Count


 


 


 


 11.9 x10^3/uL


(4.0-11.0)


 


Red Blood Count


 


 


 


 2.22 x10^6/uL


(3.50-5.40)


 


Hemoglobin


 


 


 


 6.7 g/dL


(12.0-15.5)


 


Hematocrit


 


 


 


 20.6 %


(36.0-47.0)


 


Mean Corpuscular Volume    93 fL () 


 


Mean Corpuscular Hemoglobin    30 pg (25-35) 


 


Mean Corpuscular Hemoglobin


Concent 


 


 


 32 g/dL


(31-37)


 


Red Cell Distribution Width


 


 


 


 18.8 %


(11.5-14.5)


 


Platelet Count


 


 


 


 394 x10^3/uL


(140-400)


 


Neutrophils (%) (Auto)    82 % (31-73) 


 


Lymphocytes (%) (Auto)    10 % (24-48) 


 


Monocytes (%) (Auto)    7 % (0-9) 


 


Eosinophils (%) (Auto)    1 % (0-3) 


 


Basophils (%) (Auto)    0 % (0-3) 


 


Neutrophils # (Auto)


 


 


 


 9.7 x10^3/uL


(1.8-7.7)


 


Lymphocytes # (Auto)


 


 


 


 1.2 x10^3/uL


(1.0-4.8)


 


Monocytes # (Auto)


 


 


 


 0.9 x10^3/uL


(0.0-1.1)


 


Eosinophils # (Auto)


 


 


 


 0.1 x10^3/uL


(0.0-0.7)


 


Basophils # (Auto)


 


 


 


 0.0 x10^3/uL


(0.0-0.2)


 


Sodium Level


 


 


 


 141 mmol/L


(136-145)


 


Potassium Level


 


 


 


 3.5 mmol/L


(3.5-5.1)


 


Chloride Level


 


 


 


 104 mmol/L


()


 


Carbon Dioxide Level


 


 


 


 27 mmol/L


(21-32)


 


Anion Gap    10 (6-14) 


 


Blood Urea Nitrogen


 


 


 


 67 mg/dL


(7-20)


 


Creatinine


 


 


 


 1.4 mg/dL


(0.6-1.0)


 


Estimated GFR


(Cockcroft-Gault) 


 


 


 40.0 





 


Glucose Level


 


 


 


 143 mg/dL


(70-99)


 


Calcium Level


 


 


 


 8.8 mg/dL


(8.5-10.1)


 


Phosphorus Level


 


 


 


 3.1 mg/dL


(2.6-4.7)


 


Albumin


 


 


 


 2.7 g/dL


(3.4-5.0)


 


Test


 4/22/20


05:59 4/22/20


12:19 


 





 


Glucose (Fingerstick)


 135 mg/dL


(70-99) 146 mg/dL


(70-99) 


 











Laboratory Tests








Test


 4/21/20


23:33 4/22/20


05:15 4/22/20


05:30 4/22/20


05:59


 


Glucose (Fingerstick)


 145 mg/dL


(70-99) 


 


 135 mg/dL


(70-99)


 


Magnesium Level


 


 2.1 mg/dL


(1.8-2.4) 


 





 


White Blood Count


 


 


 11.9 x10^3/uL


(4.0-11.0) 





 


Red Blood Count


 


 


 2.22 x10^6/uL


(3.50-5.40) 





 


Hemoglobin


 


 


 6.7 g/dL


(12.0-15.5) 





 


Hematocrit


 


 


 20.6 %


(36.0-47.0) 





 


Mean Corpuscular Volume   93 fL ()  


 


Mean Corpuscular Hemoglobin   30 pg (25-35)  


 


Mean Corpuscular Hemoglobin


Concent 


 


 32 g/dL


(31-37) 





 


Red Cell Distribution Width


 


 


 18.8 %


(11.5-14.5) 





 


Platelet Count


 


 


 394 x10^3/uL


(140-400) 





 


Neutrophils (%) (Auto)   82 % (31-73)  


 


Lymphocytes (%) (Auto)   10 % (24-48)  


 


Monocytes (%) (Auto)   7 % (0-9)  


 


Eosinophils (%) (Auto)   1 % (0-3)  


 


Basophils (%) (Auto)   0 % (0-3)  


 


Neutrophils # (Auto)


 


 


 9.7 x10^3/uL


(1.8-7.7) 





 


Lymphocytes # (Auto)


 


 


 1.2 x10^3/uL


(1.0-4.8) 





 


Monocytes # (Auto)


 


 


 0.9 x10^3/uL


(0.0-1.1) 





 


Eosinophils # (Auto)


 


 


 0.1 x10^3/uL


(0.0-0.7) 





 


Basophils # (Auto)


 


 


 0.0 x10^3/uL


(0.0-0.2) 





 


Sodium Level


 


 


 141 mmol/L


(136-145) 





 


Potassium Level


 


 


 3.5 mmol/L


(3.5-5.1) 





 


Chloride Level


 


 


 104 mmol/L


() 





 


Carbon Dioxide Level


 


 


 27 mmol/L


(21-32) 





 


Anion Gap   10 (6-14)  


 


Blood Urea Nitrogen


 


 


 67 mg/dL


(7-20) 





 


Creatinine


 


 


 1.4 mg/dL


(0.6-1.0) 





 


Estimated GFR


(Cockcroft-Gault) 


 


 40.0 


 





 


Glucose Level


 


 


 143 mg/dL


(70-99) 





 


Calcium Level


 


 


 8.8 mg/dL


(8.5-10.1) 





 


Phosphorus Level


 


 


 3.1 mg/dL


(2.6-4.7) 





 


Albumin


 


 


 2.7 g/dL


(3.4-5.0) 





 


Test


 4/22/20


12:19 


 


 





 


Glucose (Fingerstick)


 146 mg/dL


(70-99) 


 


 











I have reviewed the following


CT with persistent, unimproved, pancreatic necrosis


Problem List


Problems


Medical Problems:


(1) Acute pancreatitis


Status: Acute  





(2) Cholelithiasis


Status: Acute  








Assessment/Plan


severe pancreatitis.


Given severe persistent pancreatic necrosis and overall stability of other organ

systems, will consider pancreatic necrosectomy and cholecystectomy.


Ideally, will tentatively plan next week (6 weeks from initial insult).











GAMAL ZHOU MD             Apr 22, 2020 15:00

## 2020-04-22 NOTE — PDOC
Infectious Disease Note


Subjective


Subjective


Not feeling well, abd pain present


Remains on vent via trach,


TPN 


cont to be febrile





ROS


ROS


no n/v





Vital Sign


Vital Signs





Vital Signs








  Date Time  Temp Pulse Resp B/P (MAP) Pulse Ox O2 Delivery O2 Flow Rate FiO2


 


4/22/20 08:08     100 Ventilator  


 


4/22/20 08:00 100.6 94  113/62 (79)    





 100.6       


 


4/22/20 07:00   35     











Physical Exam


PHYSICAL EXAM


GENERAL: awake on vent


HEENT: Pupils equal, + NGT, oral cavity dry 


NECK:  Trach/vent 


LUNGS: rhonchi 


HEART:  S1, S2, regular 


ABDOMEN: Distended, tender, hypoactive BS, 


: [place] (4/14)


EXTREMITIES: Generalized edema, no cyanosis, SCDs bilaterally 


DERMATOLOGIC:  Warm and dry.  No generalized rash.  


CENTRAL NERVOUS SYSTEM: Extremely weak trying to talk but unable to understand


HDC & LIJ (4/14) clean





Labs


Lab





Laboratory Tests








Test


 4/21/20


12:48 4/21/20


23:33 4/22/20


05:15 4/22/20


05:30


 


Glucose (Fingerstick)


 157 mg/dL


(70-99) 145 mg/dL


(70-99) 


 





 


Magnesium Level


 


 


 2.1 mg/dL


(1.8-2.4) 





 


White Blood Count


 


 


 


 11.9 x10^3/uL


(4.0-11.0)


 


Red Blood Count


 


 


 


 2.22 x10^6/uL


(3.50-5.40)


 


Hemoglobin


 


 


 


 6.7 g/dL


(12.0-15.5)


 


Hematocrit


 


 


 


 20.6 %


(36.0-47.0)


 


Mean Corpuscular Volume    93 fL () 


 


Mean Corpuscular Hemoglobin    30 pg (25-35) 


 


Mean Corpuscular Hemoglobin


Concent 


 


 


 32 g/dL


(31-37)


 


Red Cell Distribution Width


 


 


 


 18.8 %


(11.5-14.5)


 


Platelet Count


 


 


 


 394 x10^3/uL


(140-400)


 


Neutrophils (%) (Auto)    82 % (31-73) 


 


Lymphocytes (%) (Auto)    10 % (24-48) 


 


Monocytes (%) (Auto)    7 % (0-9) 


 


Eosinophils (%) (Auto)    1 % (0-3) 


 


Basophils (%) (Auto)    0 % (0-3) 


 


Neutrophils # (Auto)


 


 


 


 9.7 x10^3/uL


(1.8-7.7)


 


Lymphocytes # (Auto)


 


 


 


 1.2 x10^3/uL


(1.0-4.8)


 


Monocytes # (Auto)


 


 


 


 0.9 x10^3/uL


(0.0-1.1)


 


Eosinophils # (Auto)


 


 


 


 0.1 x10^3/uL


(0.0-0.7)


 


Basophils # (Auto)


 


 


 


 0.0 x10^3/uL


(0.0-0.2)


 


Sodium Level


 


 


 


 141 mmol/L


(136-145)


 


Potassium Level


 


 


 


 3.5 mmol/L


(3.5-5.1)


 


Chloride Level


 


 


 


 104 mmol/L


()


 


Carbon Dioxide Level


 


 


 


 27 mmol/L


(21-32)


 


Anion Gap    10 (6-14) 


 


Blood Urea Nitrogen


 


 


 


 67 mg/dL


(7-20)


 


Creatinine


 


 


 


 1.4 mg/dL


(0.6-1.0)


 


Estimated GFR


(Cockcroft-Gault) 


 


 


 40.0 





 


Glucose Level


 


 


 


 143 mg/dL


(70-99)


 


Calcium Level


 


 


 


 8.8 mg/dL


(8.5-10.1)


 


Phosphorus Level


 


 


 


 3.1 mg/dL


(2.6-4.7)


 


Albumin


 


 


 


 2.7 g/dL


(3.4-5.0)


 


Test


 4/22/20


05:59 


 


 





 


Glucose (Fingerstick)


 135 mg/dL


(70-99) 


 


 











Micro


BC neg


Abd culture neg, cell count not done





Objective


Assessment


Leukocytosis 4/10 -improved 


Fever


   - New left IJ/[place] 4/14. PICC removed 4/14


   -? pancreatitis. blood cults 4/10 neg. Urine - neg, 


Ascites s/p paracentesis 4/15 - 4300 ml. cultures neg to date 


Severe acute gallstone pancreatitis with necrosis


   -CT 4/14 necrotizing pancreatitis with fluid and phlegmon at the pancreas 


Hypotension off levaphed 


Julian Pleural effusions


JED requiring HD 


   - s/p RIJ temporary dialysis catheter replacement, 4/2


Vent dependent respiratory failure


   -s/p Trach placement 


   -lung opacities, COVID-19 neg 


Anasarca - worse


Anemia - S/p PRBC 3/26


Hypocalcemia 


Prediabetes


HTN


Diarrhea, C. diff neg 3/23


Anemia - S/p PRBCs





Plan


Plan of Care


cont merrem (4/8) and ,micafungin,  and dapto





Gen surgery following


Maintain aspiration precautions 


Anemia deferred to primary


CBC in am





Critically ill





zyvox changed to dapto , to rule out serotonin sy causing fever,


I think sec to cont fever, and broad coverage, need to consider pancreatic 

necrosectomy


d/w surgery and GI


D/w nursing











LINN FRANZ MD               Apr 22, 2020 08:32

## 2020-04-22 NOTE — PDOC
Objective:


Objective:


D/w nurse - no significant change.


D/w ID, wondering about surgery.


Tmax 102.5.


Vital Signs:





                                   Vital Signs








  Date Time  Temp Pulse Resp B/P (MAP) Pulse Ox O2 Delivery O2 Flow Rate FiO2


 


4/22/20 10:15 100.2 108 35 152/88    





 100.2       


 


4/22/20 10:00     100 Ventilator  








Labs:





Laboratory Tests








Test


 4/21/20


12:48 4/21/20


23:33 4/22/20


05:15 4/22/20


05:30


 


Glucose (Fingerstick) 157 mg/dL  145 mg/dL   


 


Magnesium Level   2.1 mg/dL  


 


White Blood Count    11.9 x10^3/uL 


 


Red Blood Count    2.22 x10^6/uL 


 


Hemoglobin    6.7 g/dL 


 


Hematocrit    20.6 % 


 


Mean Corpuscular Volume    93 fL 


 


Mean Corpuscular Hemoglobin    30 pg 


 


Mean Corpuscular Hemoglobin


Concent 


 


 


 32 g/dL 





 


Red Cell Distribution Width    18.8 % 


 


Platelet Count    394 x10^3/uL 


 


Neutrophils (%) (Auto)    82 % 


 


Lymphocytes (%) (Auto)    10 % 


 


Monocytes (%) (Auto)    7 % 


 


Eosinophils (%) (Auto)    1 % 


 


Basophils (%) (Auto)    0 % 


 


Neutrophils # (Auto)    9.7 x10^3/uL 


 


Lymphocytes # (Auto)    1.2 x10^3/uL 


 


Monocytes # (Auto)    0.9 x10^3/uL 


 


Eosinophils # (Auto)    0.1 x10^3/uL 


 


Basophils # (Auto)    0.0 x10^3/uL 


 


Sodium Level    141 mmol/L 


 


Potassium Level    3.5 mmol/L 


 


Chloride Level    104 mmol/L 


 


Carbon Dioxide Level    27 mmol/L 


 


Anion Gap    10 


 


Blood Urea Nitrogen    67 mg/dL 


 


Creatinine    1.4 mg/dL 


 


Estimated GFR


(Cockcroft-Gault) 


 


 


 40.0 





 


Glucose Level    143 mg/dL 


 


Calcium Level    8.8 mg/dL 


 


Phosphorus Level    3.1 mg/dL 


 


Albumin    2.7 g/dL 


 


Test


 4/22/20


05:59 


 


 





 


Glucose (Fingerstick) 135 mg/dL    











PE:





GEN: ill


LUNGS: trach/vent, clear


HEART: RRR


ABD: quiet, distended, NG bilious


NEURO/PSYCH: opens eyes, tries to wave





A/P:


Gallstone pancreatitis w/ necrosis, MOSF, fever





--


Will review w/ Dr. Bhatia.





Hemodynamically unstable?:  No


Is patient in severe pain?:  No


Is NPO status required?:  Yes











RAGHAVENDRA MURCIA         Apr 22, 2020 10:25

## 2020-04-22 NOTE — NUR
Pharmacy TPN Dosing Note



S: JESENIA RICH is a 49 year old F Currently receiving Central Continuous TPN started 
03/18/20



B:Pertinent PMH: Necrotizing pancreatitis



Height: 5 feet, 8 inches

Weight: 109.4 kg



Current diet: NPO 



LABS:

Sodium:    141 

Potassium: 3.5 

Chloride:  104 

Calcium:   8.8 

Corrected Calcium: 9.84 

Magnesium: 2.1 

CO2:       27 

SCr:       1.4 

Glucose:   143, 135 

Albumin:   2.7 

AST:       23 

ALT:       11 



TPN FORMULA:

TPN TYPE:  Central Continuous

AMINO ACIDS:         125 gm

DEXTROSE:            225 gm

LIPIDS:              20 gm

SODIUM CHLORIDE:     100 mEq

POTASSIUM CHLORIDE:  40 mEq

MAGNESIUM:           20 mEq

CALCIUM:             15 mEq

INSULIN:             35 units

MULTIPLE VITAMIN:    10 ml

TRACE ELEMENTS:      0.5 ml



TPN PLAN:  

-Electrolytes and glucose stable today, no changes in TPN.

-BMP, phos, triglyceride tomorrow.





R: Continue TPN @ current rate and above formula. 

Will monitor electrolytes, glucose, and tolerance to TPN.



 VALERIE SNELL [facility], 04/22/20 [id number]

## 2020-04-22 NOTE — PDOC
PROGRESS NOTES


Assessment


Assessment


Respiratory failure.


Seizure.


Metabolic encephalopathy.


Fever 102.7 degree, recurrent fever noted.


Metabolic acidosis.


Diffuse pulmonary infiltrate.


Pleural effusion.


Pancreatitis, necrotizing.


Gallstone.


Leukocytosis.


Lymphopenia.


Electrolytes imbalances.


Hyperglycemia.


DM.


HTN.


HLD.


Anemia.


Abnormal CXR.


Obesity.


Covid-19 still suspected.





RECOMMENDATIONS/PLAN:


Continue life support in CCU at the present time.


Keppra if has further seizures.


Treat medical diseases.


OT/PT.





EEG: No seizure activity.





OBJECTIVE:


4/21/20: No seizures reported over night.





Past Medical History


Cardiovascular:  HTN, Hyperlipidemia


Endocrine:  Diabetes





Family History


Unobtainable.





Social HistoryU


not obtainable





Allergies


Coded Allergies:  


Codeine (Verified  Allergy, Intermediate, rash, 3/16/20)





ROS


Unobtainable.





NEUROLOGICAL  EXAMINATION:


Sleeping.


Not oriented to time, place and person.


PERRL.


EOMI not examined.


CN: no acute focal findings.


Muscle tone: Decreased.


Muscle strength: 3 UE, 2+ LE.


DTR: 1-2.


Plantar reflex: Neutral response bilaterally 


Gait: not able to walk.


Sensory exam: no response to stimuli..


Not able to access cerebellar signs.


F-T-N test not performed. 


Patient examined in CCU.





Objective


Objective





Vital Signs








  Date Time  Temp Pulse Resp B/P (MAP) Pulse Ox O2 Delivery O2 Flow Rate FiO2


 


4/22/20 13:11 100.0 91 25 151/80    





 100.0       


 


4/22/20 13:00     100 Ventilator  














Intake and Output 


 


 4/22/20





 07:00


 


Intake Total 2643 ml


 


Output Total 1600 ml


 


Balance 1043 ml


 


 


 


IV Total 2643 ml


 


Output Urine Total 1600 ml


 


# Bowel Movements 1











Vitals Signs


Vitals





                          VS - Last 72 Hours, by Label








  Date Time  Temp Pulse Resp B/P (MAP) Pulse Ox O2 Delivery O2 Flow Rate FiO2


 


4/22/20 13:11 100.0 91 25 151/80    





 100.0       


 


4/22/20 13:00  91 25 151/80 (103) 100 Ventilator  


 


4/22/20 12:00 100.0 86 24 127/81    





 100.0       


 


4/22/20 12:00 100.0 86 24 127/81 (96) 100 Ventilator  





 100.0       


 


4/22/20 12:00      Mechanical Ventilator  


 


4/22/20 11:37 100.9 98 35 149/92    





 100.9       


 


4/22/20 11:27 100.9 103 23 126/57    





 100.9       


 


4/22/20 11:00  97 23 160/80 (106) 100 Ventilator  


 


4/22/20 10:38 100.2 109 35 180/91    





 100.2       


 


4/22/20 10:15 100.2 108 35 152/88    





 100.2       


 


4/22/20 10:00 100.3 108 32 153/73    





 100.3       


 


4/22/20 10:00 100.3 108 32 153/73 (99) 100 Ventilator  





 100.3       


 


4/22/20 09:45 100.6 102 23 166/77    





 100.6       


 


4/22/20 09:36 100.6 106 31 156/67    





 100.6       


 


4/22/20 09:00  106 31 156/67 (96) 100 Ventilator  


 


4/22/20 08:08     100 Ventilator  


 


4/22/20 08:00      Mechanical Ventilator  


 


4/22/20 08:00 100.6 94  113/62 (79) 100 Ventilator  





 100.6       


 


4/22/20 07:00  90 35 116/59 (78) 100 Ventilator  


 


4/22/20 06:00 99.7 85 21 131/70 (90) 99 Ventilator  





 99.7       


 


4/22/20 05:00  96 22 133/73 (93) 99 Ventilator  


 


4/22/20 04:00  95 23 130/59 (82) 98 Ventilator  


 


4/22/20 03:55     100 Ventilator  


 


4/22/20 03:45      Mechanical Ventilator  


 


4/22/20 03:00 102.5 103 22 165/99 (121) 99 Ventilator  





 102.5       


 


4/22/20 02:00  95 24 130/72 (91) 100 Ventilator  


 


4/22/20 01:00  98 24 138/69 (92) 100 Ventilator  


 


4/22/20 00:00 100.6 90 24 97/56 (70) 100 Ventilator  





 100.6       


 


4/22/20 00:00      Mechanical Ventilator  


 


4/21/20 23:35     100 Ventilator  


 


4/21/20 23:00  102 29 106/56 (73) 100 Ventilator  


 


4/21/20 22:00  102 24 135/71 (92) 100 Ventilator  


 


4/21/20 21:16     100 Ventilator  


 


4/21/20 21:00  108 27 116/60 (78) 100 Ventilator  


 


4/21/20 20:00      Mechanical Ventilator  


 


4/21/20 20:00 100.6 114 24 126/93 (104) 100 Ventilator  





 100.6       


 


4/21/20 19:23     100 Ventilator  


 


4/21/20 19:00  104 25 144/91 (108) 100 Ventilator  


 


4/21/20 18:03  110 24 143/64 (90) 98 Ventilator  


 


4/21/20 17:41  118 24 161/88 (112) 100 Ventilator  


 


4/21/20 16:27      Mechanical Ventilator  


 


4/21/20 16:05 102.0 117 22 119/56 (77) 100 Ventilator  





 102.0       


 


4/21/20 15:55     100 Ventilator  


 


4/21/20 14:47  96 20 148/71 (96) 100 Ventilator  


 


4/21/20 14:03  102 18 84/56 (65) 100 Ventilator  


 


4/21/20 13:28     100 Ventilator  


 


4/21/20 12:53      Mechanical Ventilator  


 


4/21/20 12:51 100.1 106 18 90/46 (61) 100 Ventilator  





 100.1       


 


4/21/20 12:30  103 18 146/63 (90) 100 Ventilator  


 


4/21/20 12:30     99 Ventilator  


 


4/21/20 08:00  98 18 118/64 (82) 100 Ventilator  


 


4/21/20 08:00      Mechanical Ventilator  


 


4/21/20 07:42     100 Ventilator  


 


4/21/20 07:00 99.9 116 18 133/71 (91) 100 Ventilator  





 99.9       











Laboratory


Laboratory





Laboratory Tests








Test


 4/21/20


23:33 4/22/20


05:15 4/22/20


05:30 4/22/20


05:59


 


Glucose (Fingerstick)


 145 mg/dL


(70-99) 


 


 135 mg/dL


(70-99)


 


Magnesium Level


 


 2.1 mg/dL


(1.8-2.4) 


 





 


White Blood Count


 


 


 11.9 x10^3/uL


(4.0-11.0) 





 


Red Blood Count


 


 


 2.22 x10^6/uL


(3.50-5.40) 





 


Hemoglobin


 


 


 6.7 g/dL


(12.0-15.5) 





 


Hematocrit


 


 


 20.6 %


(36.0-47.0) 





 


Mean Corpuscular Volume   93 fL ()  


 


Mean Corpuscular Hemoglobin   30 pg (25-35)  


 


Mean Corpuscular Hemoglobin


Concent 


 


 32 g/dL


(31-37) 





 


Red Cell Distribution Width


 


 


 18.8 %


(11.5-14.5) 





 


Platelet Count


 


 


 394 x10^3/uL


(140-400) 





 


Neutrophils (%) (Auto)   82 % (31-73)  


 


Lymphocytes (%) (Auto)   10 % (24-48)  


 


Monocytes (%) (Auto)   7 % (0-9)  


 


Eosinophils (%) (Auto)   1 % (0-3)  


 


Basophils (%) (Auto)   0 % (0-3)  


 


Neutrophils # (Auto)


 


 


 9.7 x10^3/uL


(1.8-7.7) 





 


Lymphocytes # (Auto)


 


 


 1.2 x10^3/uL


(1.0-4.8) 





 


Monocytes # (Auto)


 


 


 0.9 x10^3/uL


(0.0-1.1) 





 


Eosinophils # (Auto)


 


 


 0.1 x10^3/uL


(0.0-0.7) 





 


Basophils # (Auto)


 


 


 0.0 x10^3/uL


(0.0-0.2) 





 


Sodium Level


 


 


 141 mmol/L


(136-145) 





 


Potassium Level


 


 


 3.5 mmol/L


(3.5-5.1) 





 


Chloride Level


 


 


 104 mmol/L


() 





 


Carbon Dioxide Level


 


 


 27 mmol/L


(21-32) 





 


Anion Gap   10 (6-14)  


 


Blood Urea Nitrogen


 


 


 67 mg/dL


(7-20) 





 


Creatinine


 


 


 1.4 mg/dL


(0.6-1.0) 





 


Estimated GFR


(Cockcroft-Gault) 


 


 40.0 


 





 


Glucose Level


 


 


 143 mg/dL


(70-99) 





 


Calcium Level


 


 


 8.8 mg/dL


(8.5-10.1) 





 


Phosphorus Level


 


 


 3.1 mg/dL


(2.6-4.7) 





 


Albumin


 


 


 2.7 g/dL


(3.4-5.0) 





 


Test


 4/22/20


12:19 


 


 





 


Glucose (Fingerstick)


 146 mg/dL


(70-99) 


 


 











Microbiology


4/15/20 Aerobic and Anaerobic Culture - Final, Complete


          


4/15/20 Anaerobic Culture Result 1 ([facility]) - Final, Complete


          


4/15/20 Aerobic Culture - Final, Complete


          


4/15/20 Aerobic Culture Result 1 ([facility]) - Final, Complete


          


4/15/20 Gram Stain - Final, Complete


          


4/15/20 Gram Stain Result 1 ([facility]) - Final, Complete


          


4/15/20 Gram Stain Result 2 ([facility]) - Final, Complete


          


4/14/20 Blood Culture - Final, Complete


          NO GROWTH AFTER 5 DAYS


4/12/20 Urine Culture - Final, Complete


          


4/12/20 Urine Culture Result 1 ([facility]) - Final, Complete





Medication


Medications





Current Medications


Daptomycin 430 mg/ Sodium Chloride 50 ml @  100 mls/hr Q24H IV  Last 

administered on 4/22/20at 12:32;  Start 4/22/20 at 13:00


Daptomycin 430 mg/ Sodium Chloride 50 ml @  100 mls/hr Q48H IV ;  Start 4/23/20 

at 09:00;  Stop 4/22/20 at 11:55;  Status DC


Enoxaparin Sodium (Lovenox 100mg Syringe) 100 mg Q12HR SQ ;  Start 4/21/20 at 

21:00;  Status UNV


Sodium Chloride 100 meq/Potassium Chloride 40 meq/ Magnesium Sulfate 20 

meq/Calcium Gluconate 15 meq/ Multivitamins 10 ml/Chromium/ Copper/Manganese/ 

Seleni/Zn 0.5 ml/ Insulin Human Regular 35 unit/ Total Parenteral 

Nutrition/Amino Acids/Dextrose/ Fat Emulsion Intravenous 1,400 ml @  58.333 mls/

hr TPN  CONT IV  Last administered on 4/21/20at 21:26;  Start 4/21/20 at 22:00; 

Stop 4/22/20 at 21:59


Sodium Chloride 100 meq/Potassium Chloride 40 meq/ Magnesium Sulfate 20 

meq/Calcium Gluconate 15 meq/ Multivitamins 10 ml/Chromium/ Copper/Manganese/ 

Seleni/Zn 0.5 ml/ Insulin Human Regular 35 unit/ Total Parenteral 

Nutrition/Amino Acids/Dextrose/ Fat Emulsion Intravenous 1,400 ml @  58.333 mls/

hr TPN  CONT IV ;  Start 4/22/20 at 22:00;  Stop 4/23/20 at 21:59





Comment


Review of Relevant


I have reviewed the following items josy (where applicable) has been applied.











DORYS LANDA MD                 Apr 22, 2020 13:38

## 2020-04-22 NOTE — PDOC
[facility] PROGRESS NOTE


Chief Complaint


Chief Complaint


Respiratory failure requiring mechanical ventilation (on vent since 3/23)


Tracheostomy


bilateral pleural effusions/pulm edema 


Sepsis


Severe Acute gallstone pancreatitis (not a surgical candidate at this time) with

necrosis


Acute kidney failure now requiring dialysis


Salpingitis


Gallstones (Calculus of gallbladder with acute cholecystitis without 

obstruction)


HTN 


Leukocytosis 


Hypoxia


Uterine fibroid


Intractable pain


Intractable nausea


Covid 19 negative. 


Acute on chronic anemia 


EEG: No seizure activity.


ESRD on HD


Hyperglycemia, persistently in 200s





History of Present Illness


History of Present Illness


4-


Patient seen and examined in the ICU


She has a tracheostomy


AC/18/4 50/30%


Chart reviewed


Discussed with RN


She remains extremely critically ill








4-


Patient seen in ICU room 116 she is currently on dialysis


Tries to mumble is grabbing at her face with her left hand


She is extremely weak cannot communicate well


She is swollen


Chart reviewed


Discussed with RN


Very critically ill











4- patient seen and examined in the ICU





She is on the vent via tracheostomy


AC/14/4 50/35%


Has IV TPN


NG to suction


Wearing mitts for patient safety


Still seems encephalopathic


Chart reviewed


Discussed with RN


She remains critically ill











Ms Tadeo is a 48yo F w/ PMHx HTN, prediabetes who presented to the emergency 

room with complaints of abdominal pain on 3/16/2020. Found with Lipase 66213, 

, , Bilirubin 1.4.


CT abdomen confirms pancreatic inflammation, peripancreatic fluid and inflam

matory changes around the pancreas consistent with pancreatitis. Cholelithiasis 

and 1.4cm uterine fibroid as well as possible left salpingitis. Admitted for 

further care


GI, General surgery, ID, Pulm consulted.





3/17: No urine output. Added dilaudid for pain, PICC placed per IR. Renal US 

negative.Seen bedside in ICU, given 2L additional NSS and albumin infusion. 

Still hypotensive, started on levophed. Repeat CT abdomen w/ necrosis. 


3/18: O2 saturation 87% on nasal cannula oxygen. Dialysis catheter per neph

rology


3/19: She is now on BiPAP appears more ill, now on dialysis


3/20: Seen on BiPAP. Her mother and another family member are present and seemed

to be good support for her. Currently on dialysis. Appears critically ill


3/21: Overnight Tmax 101.7 , still on BiPAP FiO2 40%, still on low dose Levophed

gtt, TPN initiated. On dialysis


4/6: Tracheostomy


4/12: S/p tracheostomy on vent spontaneous respirations with 5 of pressure 

support 35% FiO2, rectal tube and a [place], off pressors


4/13: Off pressors. Seen on dialysis this morning. BUN 80. Tracking with her 

eyes. Still on vent via trach.


4/14: BUN 68, Cr 1.7. Temp 100.2F axillary. WBC 9.8. Still on vent via trach. 

Tracking reasonably well. In obvious discomfort. Removed PICC and CVC LIJ and 

replaced. Tips sent for culture. CT chest/abd/pelvis with bilateral pleural 

effusion and ascites.


4/15: Renal function stable. Still on vent. More interactive today. Miming wish 

for food. Plan discussed for thoracentesis/paracentesis with daughters today. 

They were under impression patient was doing worse due to a miscommunication 

which has been clarified over the phone. 4.3L removed.


4/16: BUN 88, Cr 1.8. Much more interactive today. Appears more comfortable 

today.


4/17: Febrile overnight 101.8F. More interactive, still on vent. Asking for ice 

by miming.


4/18: Afebrile overnight. TMax last 24 hours 100.6F. Hb 7.1. Interactive when 

awake.





Afebrile. Hb 7. Not tolerating vent wean. Very interactive, on low dose 

precedex. Excellent UOP over the past 72 hours





Plan:


Cont vent weaning, dialysis


Trach shield during day if ok with pulm. Would recommend ABG after 4 hours to 

r/o CO2 retention, however and still vent overnight





Vitals/I&O


Vitals/I&O:





                                   Vital Signs








  Date Time  Temp Pulse Resp B/P (MAP) Pulse Ox O2 Delivery O2 Flow Rate FiO2


 


4/22/20 12:00 100.0 86 24 127/81    





 100.0       


 


4/22/20 12:00     100 Ventilator  














                                    I & O   


 


 4/21/20 4/21/20 4/22/20





 15:00 23:00 07:00


 


Intake Total 100 ml 50 ml 2493 ml


 


Output Total 350 ml 665 ml 585 ml


 


Balance -250 ml -615 ml 1908 ml











Physical Exam


Physical Exam:


GENERAL: Sleeping awakens barely on Precedex


HEENT: Pupils equal, + NGT, oral cavity dry 


NECK:  Trach/vent 


LUNGS: rhonchi 


HEART:  S1, S2, regular 


ABDOMEN: Distended, tender, hypoactive BS, 


: [place] (4/14)


EXTREMITIES: Generalized edema, no cyanosis, SCDs bilaterally 


DERMATOLOGIC:  Warm and dry.  No generalized rash.  


CENTRAL NERVOUS SYSTEM: Extremely weak trying to talk but unable to understand


HDC & LIJ (4/14) clean


General:  Alert, Cooperative


Heart:  Regular rate, Normal S1, Other (increased rate)


Lungs:  Crackles, Other (dimished in BLL  nc at perrl   nose clear   neck trach 

site ok   no lad  no thyromegaly)


Abdomen:  Other (distended )


Extremities:  Other (ANASARCA)


Skin:  Other (mottling noted to extremities )





Labs


Labs:





Laboratory Tests








Test


 4/21/20


12:48 4/21/20


23:33 4/22/20


05:15 4/22/20


05:30


 


Glucose (Fingerstick)


 157 mg/dL


(70-99) 145 mg/dL


(70-99) 


 





 


Magnesium Level


 


 


 2.1 mg/dL


(1.8-2.4) 





 


White Blood Count


 


 


 


 11.9 x10^3/uL


(4.0-11.0)


 


Red Blood Count


 


 


 


 2.22 x10^6/uL


(3.50-5.40)


 


Hemoglobin


 


 


 


 6.7 g/dL


(12.0-15.5)


 


Hematocrit


 


 


 


 20.6 %


(36.0-47.0)


 


Mean Corpuscular Volume    93 fL () 


 


Mean Corpuscular Hemoglobin    30 pg (25-35) 


 


Mean Corpuscular Hemoglobin


Concent 


 


 


 32 g/dL


(31-37)


 


Red Cell Distribution Width


 


 


 


 18.8 %


(11.5-14.5)


 


Platelet Count


 


 


 


 394 x10^3/uL


(140-400)


 


Neutrophils (%) (Auto)    82 % (31-73) 


 


Lymphocytes (%) (Auto)    10 % (24-48) 


 


Monocytes (%) (Auto)    7 % (0-9) 


 


Eosinophils (%) (Auto)    1 % (0-3) 


 


Basophils (%) (Auto)    0 % (0-3) 


 


Neutrophils # (Auto)


 


 


 


 9.7 x10^3/uL


(1.8-7.7)


 


Lymphocytes # (Auto)


 


 


 


 1.2 x10^3/uL


(1.0-4.8)


 


Monocytes # (Auto)


 


 


 


 0.9 x10^3/uL


(0.0-1.1)


 


Eosinophils # (Auto)


 


 


 


 0.1 x10^3/uL


(0.0-0.7)


 


Basophils # (Auto)


 


 


 


 0.0 x10^3/uL


(0.0-0.2)


 


Sodium Level


 


 


 


 141 mmol/L


(136-145)


 


Potassium Level


 


 


 


 3.5 mmol/L


(3.5-5.1)


 


Chloride Level


 


 


 


 104 mmol/L


()


 


Carbon Dioxide Level


 


 


 


 27 mmol/L


(21-32)


 


Anion Gap    10 (6-14) 


 


Blood Urea Nitrogen


 


 


 


 67 mg/dL


(7-20)


 


Creatinine


 


 


 


 1.4 mg/dL


(0.6-1.0)


 


Estimated GFR


(Cockcroft-Gault) 


 


 


 40.0 





 


Glucose Level


 


 


 


 143 mg/dL


(70-99)


 


Calcium Level


 


 


 


 8.8 mg/dL


(8.5-10.1)


 


Phosphorus Level


 


 


 


 3.1 mg/dL


(2.6-4.7)


 


Albumin


 


 


 


 2.7 g/dL


(3.4-5.0)


 


Test


 4/22/20


05:59 


 


 





 


Glucose (Fingerstick)


 135 mg/dL


(70-99) 


 


 














Review of Systems


Review of Systems:


Unable to obtain





Assessment and Plan


Assessmemt and Plan


Problems


Medical Problems:


(1) Acute pancreatitis


Status: Acute  





(2) Cholelithiasis


Status: Acute  





Respiratory failure requiring intubation


Status post tracheostomy


Severe Acute gallstone pancreatitis (she is not a surgical candidate as she is 

too ill)


Acute kidney failure now requiring dialysis


Salpingo--itis


Gallstones (Calculus of gallbladder with acute cholecystitis without 

obstruction)


HTN 


Leukocytosis 


Hypoxia


Uterine fibroid


Hypoxia with respiratory failure


Intractable pain


Intractable nausea





Plan


ICU monitoring


Tracheostomy care


NG suctioning


Vent management per pulm. pressure support as tolerated


Dialysis per nephro


Merrem (4/8) and Zyvox (4/10) and micafungin 


Follow cultures


Trach care


Nutritional support


When necessary levo fed


Neuro following


No plans for sx at present as she is to ill still


Discharge disposition pending (? Eventual LTAC)


She is still critically ill


Prognosis very guarded


Total time 32 min





Comment


Review of Relevant


I have reviewed the following items josy (where applicable) has been applied.


Medications:





Current Medications








 Medications


  (Trade)  Dose


 Ordered  Sig/Yee


 Route


 PRN Reason  Start Time


 Stop Time Status Last Admin


Dose Admin


 


 Sodium Chloride


 100 meq/Potassium


 Chloride 40 meq/


 Magnesium Sulfate


 20 meq/Calcium


 Gluconate 15 meq/


 Multivitamins 10


 ml/Chromium/


 Copper/Manganese/


 Seleni/Zn 0.5 ml/


 Insulin Human


 Regular 35 unit/


 Total Parenteral


 Nutrition/Amino


 Acids/Dextrose/


 Fat Emulsion


 Intravenous  1,400 ml @ 


 58.333 mls/


 hr  TPN  CONT


 IV


   4/21/20 22:00


 4/22/20 21:59  4/21/20 21:26














Hemodynamically unstable?:  No


Is patient in severe pain?:  No


Is NPO status required?:  Yes











HECTOR MASON III DO           Apr 22, 2020 12:22

## 2020-04-22 NOTE — PDOC
PULMONARY PROGRESS NOTES


Subjective


Patient intubated on 3/23 , s/p trach 4/6, 


Patient responding to verbal stimuli


Vitals





Vital Signs








  Date Time  Temp Pulse Resp B/P (MAP) Pulse Ox O2 Delivery O2 Flow Rate FiO2


 


4/22/20 09:45 100.6 102 23 166/77    





 100.6       


 


4/22/20 09:00     100 Ventilator  








Comments


ros unable to obtain  on vent


Lungs:  Crackles, Other (dimished in BLL  nc at perrl   nose clear   neck trach 

site ok   no lad  no thyromegaly)


Cardiovascular:  S1, S2


Abdomen:  Soft, Non-tender, Other (distended)


Extremities:  Other (+3 generalized edema )


Skin:  Warm, Dry


Labs





Laboratory Tests








Test


 4/20/20


12:23 4/20/20


18:27 4/21/20


00:01 4/21/20


05:45


 


Glucose (Fingerstick)


 176 mg/dL


(70-99) 145 mg/dL


(70-99) 170 mg/dL


(70-99) 





 


White Blood Count


 


 


 


 16.3 x10^3/uL


(4.0-11.0)


 


Red Blood Count


 


 


 


 2.68 x10^6/uL


(3.50-5.40)


 


Hemoglobin


 


 


 


 8.2 g/dL


(12.0-15.5)


 


Hematocrit


 


 


 


 25.5 %


(36.0-47.0)


 


Mean Corpuscular Volume    95 fL () 


 


Mean Corpuscular Hemoglobin    31 pg (25-35) 


 


Mean Corpuscular Hemoglobin


Concent 


 


 


 32 g/dL


(31-37)


 


Red Cell Distribution Width


 


 


 


 18.8 %


(11.5-14.5)


 


Platelet Count


 


 


 


 521 x10^3/uL


(140-400)


 


Neutrophils (%) (Auto)    79 % (31-73) 


 


Lymphocytes (%) (Auto)    13 % (24-48) 


 


Monocytes (%) (Auto)    8 % (0-9) 


 


Eosinophils (%) (Auto)    0 % (0-3) 


 


Basophils (%) (Auto)    1 % (0-3) 


 


Neutrophils # (Auto)


 


 


 


 12.8 x10^3/uL


(1.8-7.7)


 


Lymphocytes # (Auto)


 


 


 


 2.1 x10^3/uL


(1.0-4.8)


 


Monocytes # (Auto)


 


 


 


 1.2 x10^3/uL


(0.0-1.1)


 


Eosinophils # (Auto)


 


 


 


 0.0 x10^3/uL


(0.0-0.7)


 


Basophils # (Auto)


 


 


 


 0.1 x10^3/uL


(0.0-0.2)


 


Segmented Neutrophils %    61 % (35-66) 


 


Band Neutrophils %    26 % (0-9) 


 


Lymphocytes %    6 % (24-48) 


 


Monocytes %    7 % (0-10) 


 


Toxic Granulation    Slight 


 


Platelet Estimate


 


 


 


 Increased


(ADEQUATE)


 


Hypochromasia    Slight 


 


Anisocytosis    Slight 


 


Microcytosis    Slight 


 


Sodium Level


 


 


 


 142 mmol/L


(136-145)


 


Potassium Level


 


 


 


 3.7 mmol/L


(3.5-5.1)


 


Chloride Level


 


 


 


 105 mmol/L


()


 


Carbon Dioxide Level


 


 


 


 23 mmol/L


(21-32)


 


Anion Gap    14 (6-14) 


 


Blood Urea Nitrogen


 


 


 


 89 mg/dL


(7-20)


 


Creatinine


 


 


 


 1.6 mg/dL


(0.6-1.0)


 


Estimated GFR


(Cockcroft-Gault) 


 


 


 34.3 





 


Glucose Level


 


 


 


 141 mg/dL


(70-99)


 


Calcium Level


 


 


 


 8.9 mg/dL


(8.5-10.1)


 


Magnesium Level


 


 


 


 2.2 mg/dL


(1.8-2.4)


 


Test


 4/21/20


06:04 4/21/20


12:48 4/21/20


23:33 4/22/20


05:15


 


Glucose (Fingerstick)


 136 mg/dL


(70-99) 157 mg/dL


(70-99) 145 mg/dL


(70-99) 





 


Magnesium Level


 


 


 


 2.1 mg/dL


(1.8-2.4)


 


Test


 4/22/20


05:30 4/22/20


05:59 


 





 


White Blood Count


 11.9 x10^3/uL


(4.0-11.0) 


 


 





 


Red Blood Count


 2.22 x10^6/uL


(3.50-5.40) 


 


 





 


Hemoglobin


 6.7 g/dL


(12.0-15.5) 


 


 





 


Hematocrit


 20.6 %


(36.0-47.0) 


 


 





 


Mean Corpuscular Volume 93 fL ()    


 


Mean Corpuscular Hemoglobin 30 pg (25-35)    


 


Mean Corpuscular Hemoglobin


Concent 32 g/dL


(31-37) 


 


 





 


Red Cell Distribution Width


 18.8 %


(11.5-14.5) 


 


 





 


Platelet Count


 394 x10^3/uL


(140-400) 


 


 





 


Neutrophils (%) (Auto) 82 % (31-73)    


 


Lymphocytes (%) (Auto) 10 % (24-48)    


 


Monocytes (%) (Auto) 7 % (0-9)    


 


Eosinophils (%) (Auto) 1 % (0-3)    


 


Basophils (%) (Auto) 0 % (0-3)    


 


Neutrophils # (Auto)


 9.7 x10^3/uL


(1.8-7.7) 


 


 





 


Lymphocytes # (Auto)


 1.2 x10^3/uL


(1.0-4.8) 


 


 





 


Monocytes # (Auto)


 0.9 x10^3/uL


(0.0-1.1) 


 


 





 


Eosinophils # (Auto)


 0.1 x10^3/uL


(0.0-0.7) 


 


 





 


Basophils # (Auto)


 0.0 x10^3/uL


(0.0-0.2) 


 


 





 


Sodium Level


 141 mmol/L


(136-145) 


 


 





 


Potassium Level


 3.5 mmol/L


(3.5-5.1) 


 


 





 


Chloride Level


 104 mmol/L


() 


 


 





 


Carbon Dioxide Level


 27 mmol/L


(21-32) 


 


 





 


Anion Gap 10 (6-14)    


 


Blood Urea Nitrogen


 67 mg/dL


(7-20) 


 


 





 


Creatinine


 1.4 mg/dL


(0.6-1.0) 


 


 





 


Estimated GFR


(Cockcroft-Gault) 40.0 


 


 


 





 


Glucose Level


 143 mg/dL


(70-99) 


 


 





 


Calcium Level


 8.8 mg/dL


(8.5-10.1) 


 


 





 


Phosphorus Level


 3.1 mg/dL


(2.6-4.7) 


 


 





 


Albumin


 2.7 g/dL


(3.4-5.0) 


 


 





 


Glucose (Fingerstick)


 


 135 mg/dL


(70-99) 


 











Laboratory Tests








Test


 4/21/20


12:48 4/21/20


23:33 4/22/20


05:15 4/22/20


05:30


 


Glucose (Fingerstick)


 157 mg/dL


(70-99) 145 mg/dL


(70-99) 


 





 


Magnesium Level


 


 


 2.1 mg/dL


(1.8-2.4) 





 


White Blood Count


 


 


 


 11.9 x10^3/uL


(4.0-11.0)


 


Red Blood Count


 


 


 


 2.22 x10^6/uL


(3.50-5.40)


 


Hemoglobin


 


 


 


 6.7 g/dL


(12.0-15.5)


 


Hematocrit


 


 


 


 20.6 %


(36.0-47.0)


 


Mean Corpuscular Volume    93 fL () 


 


Mean Corpuscular Hemoglobin    30 pg (25-35) 


 


Mean Corpuscular Hemoglobin


Concent 


 


 


 32 g/dL


(31-37)


 


Red Cell Distribution Width


 


 


 


 18.8 %


(11.5-14.5)


 


Platelet Count


 


 


 


 394 x10^3/uL


(140-400)


 


Neutrophils (%) (Auto)    82 % (31-73) 


 


Lymphocytes (%) (Auto)    10 % (24-48) 


 


Monocytes (%) (Auto)    7 % (0-9) 


 


Eosinophils (%) (Auto)    1 % (0-3) 


 


Basophils (%) (Auto)    0 % (0-3) 


 


Neutrophils # (Auto)


 


 


 


 9.7 x10^3/uL


(1.8-7.7)


 


Lymphocytes # (Auto)


 


 


 


 1.2 x10^3/uL


(1.0-4.8)


 


Monocytes # (Auto)


 


 


 


 0.9 x10^3/uL


(0.0-1.1)


 


Eosinophils # (Auto)


 


 


 


 0.1 x10^3/uL


(0.0-0.7)


 


Basophils # (Auto)


 


 


 


 0.0 x10^3/uL


(0.0-0.2)


 


Sodium Level


 


 


 


 141 mmol/L


(136-145)


 


Potassium Level


 


 


 


 3.5 mmol/L


(3.5-5.1)


 


Chloride Level


 


 


 


 104 mmol/L


()


 


Carbon Dioxide Level


 


 


 


 27 mmol/L


(21-32)


 


Anion Gap    10 (6-14) 


 


Blood Urea Nitrogen


 


 


 


 67 mg/dL


(7-20)


 


Creatinine


 


 


 


 1.4 mg/dL


(0.6-1.0)


 


Estimated GFR


(Cockcroft-Gault) 


 


 


 40.0 





 


Glucose Level


 


 


 


 143 mg/dL


(70-99)


 


Calcium Level


 


 


 


 8.8 mg/dL


(8.5-10.1)


 


Phosphorus Level


 


 


 


 3.1 mg/dL


(2.6-4.7)


 


Albumin


 


 


 


 2.7 g/dL


(3.4-5.0)


 


Test


 4/22/20


05:59 


 


 





 


Glucose (Fingerstick)


 135 mg/dL


(70-99) 


 


 











Medications





Active Scripts








 Medications  Dose


 Route/Sig


 Max Daily Dose Days Date Category


 


 Bisoprolol


 Fumarate 5 Mg


 Tablet  10 Mg


 PO DAILY


   3/16/20 Reported











Impression


.


IMPRESSION:


1.  Acute hypoxemic respiratory failure secondary to ARDS status post trach,


2.  Gallstone pancreatitis


3.  Severe metabolic acidosis.stable


4.  Acute kidney injury-stable, ON HD-- continue to improve 


5.  Acute gallstone pancreatitis.


6.  Hypoalbuminemia.


7.  Moderate persistent effusions


8.  Fever-persist.  Per ID, per surgery


9.  Chronic anemia


10. Covid 19 testing negative


11. Moderate to large ascites-S/P paracentisis


S/P paracentisis with 4 liters removed on 4/15/20





Plan


.


Patient still febrile, will await surgery input


Not ready for weaning trials.


hep sq and protonix for prophylaxis


Follow surgery recs


Follow ID rec, abx per id


Follow nephrology recs 


Nutritional support per surgery


continue TPN for nutrition 





DVT/GI PPX 


d/w RN/RT











STEVE MIRANDA MD              Apr 22, 2020 09:59

## 2020-04-23 VITALS — SYSTOLIC BLOOD PRESSURE: 130 MMHG | DIASTOLIC BLOOD PRESSURE: 71 MMHG

## 2020-04-23 VITALS — SYSTOLIC BLOOD PRESSURE: 137 MMHG | DIASTOLIC BLOOD PRESSURE: 88 MMHG

## 2020-04-23 VITALS — SYSTOLIC BLOOD PRESSURE: 152 MMHG | DIASTOLIC BLOOD PRESSURE: 86 MMHG

## 2020-04-23 VITALS — SYSTOLIC BLOOD PRESSURE: 116 MMHG | DIASTOLIC BLOOD PRESSURE: 56 MMHG

## 2020-04-23 VITALS — DIASTOLIC BLOOD PRESSURE: 79 MMHG | SYSTOLIC BLOOD PRESSURE: 157 MMHG

## 2020-04-23 VITALS — SYSTOLIC BLOOD PRESSURE: 149 MMHG | DIASTOLIC BLOOD PRESSURE: 59 MMHG

## 2020-04-23 VITALS — SYSTOLIC BLOOD PRESSURE: 158 MMHG | DIASTOLIC BLOOD PRESSURE: 87 MMHG

## 2020-04-23 VITALS — DIASTOLIC BLOOD PRESSURE: 89 MMHG | SYSTOLIC BLOOD PRESSURE: 152 MMHG

## 2020-04-23 VITALS — DIASTOLIC BLOOD PRESSURE: 89 MMHG | SYSTOLIC BLOOD PRESSURE: 161 MMHG

## 2020-04-23 VITALS — DIASTOLIC BLOOD PRESSURE: 66 MMHG | SYSTOLIC BLOOD PRESSURE: 123 MMHG

## 2020-04-23 VITALS — SYSTOLIC BLOOD PRESSURE: 190 MMHG | DIASTOLIC BLOOD PRESSURE: 146 MMHG

## 2020-04-23 VITALS — SYSTOLIC BLOOD PRESSURE: 117 MMHG | DIASTOLIC BLOOD PRESSURE: 63 MMHG

## 2020-04-23 VITALS — DIASTOLIC BLOOD PRESSURE: 90 MMHG | SYSTOLIC BLOOD PRESSURE: 170 MMHG

## 2020-04-23 VITALS — SYSTOLIC BLOOD PRESSURE: 110 MMHG | DIASTOLIC BLOOD PRESSURE: 59 MMHG

## 2020-04-23 VITALS — DIASTOLIC BLOOD PRESSURE: 92 MMHG | SYSTOLIC BLOOD PRESSURE: 161 MMHG

## 2020-04-23 VITALS — DIASTOLIC BLOOD PRESSURE: 74 MMHG | SYSTOLIC BLOOD PRESSURE: 144 MMHG

## 2020-04-23 VITALS — SYSTOLIC BLOOD PRESSURE: 145 MMHG | DIASTOLIC BLOOD PRESSURE: 82 MMHG

## 2020-04-23 VITALS — DIASTOLIC BLOOD PRESSURE: 63 MMHG | SYSTOLIC BLOOD PRESSURE: 114 MMHG

## 2020-04-23 VITALS — DIASTOLIC BLOOD PRESSURE: 82 MMHG | SYSTOLIC BLOOD PRESSURE: 171 MMHG

## 2020-04-23 VITALS — DIASTOLIC BLOOD PRESSURE: 69 MMHG | SYSTOLIC BLOOD PRESSURE: 140 MMHG

## 2020-04-23 VITALS — DIASTOLIC BLOOD PRESSURE: 79 MMHG | SYSTOLIC BLOOD PRESSURE: 153 MMHG

## 2020-04-23 VITALS — DIASTOLIC BLOOD PRESSURE: 79 MMHG | SYSTOLIC BLOOD PRESSURE: 165 MMHG

## 2020-04-23 VITALS — SYSTOLIC BLOOD PRESSURE: 156 MMHG | DIASTOLIC BLOOD PRESSURE: 78 MMHG

## 2020-04-23 VITALS — SYSTOLIC BLOOD PRESSURE: 142 MMHG | DIASTOLIC BLOOD PRESSURE: 80 MMHG

## 2020-04-23 LAB
ANION GAP SERPL CALC-SCNC: 11 MMOL/L (ref 6–14)
BASOPHILS # BLD AUTO: 0 X10^3/UL (ref 0–0.2)
BASOPHILS NFR BLD: 0 % (ref 0–3)
BUN SERPL-MCNC: 78 MG/DL (ref 7–20)
CALCIUM SERPL-MCNC: 9.4 MG/DL (ref 8.5–10.1)
CHLORIDE SERPL-SCNC: 107 MMOL/L (ref 98–107)
CO2 SERPL-SCNC: 26 MMOL/L (ref 21–32)
CREAT SERPL-MCNC: 1.4 MG/DL (ref 0.6–1)
EOSINOPHIL NFR BLD: 0 % (ref 0–3)
EOSINOPHIL NFR BLD: 0 X10^3/UL (ref 0–0.7)
ERYTHROCYTE [DISTWIDTH] IN BLOOD BY AUTOMATED COUNT: 18.4 % (ref 11.5–14.5)
GFR SERPLBLD BASED ON 1.73 SQ M-ARVRAT: 40 ML/MIN
GLUCOSE SERPL-MCNC: 146 MG/DL (ref 70–99)
HCT VFR BLD CALC: 28.6 % (ref 36–47)
HGB BLD-MCNC: 9.4 G/DL (ref 12–15.5)
LYMPHOCYTES # BLD: 1.5 X10^3/UL (ref 1–4.8)
LYMPHOCYTES NFR BLD AUTO: 10 % (ref 24–48)
MCH RBC QN AUTO: 30 PG (ref 25–35)
MCHC RBC AUTO-ENTMCNC: 33 G/DL (ref 31–37)
MCV RBC AUTO: 90 FL (ref 79–100)
MONO #: 1.3 X10^3/UL (ref 0–1.1)
MONOCYTES NFR BLD: 8 % (ref 0–9)
NEUT #: 12.8 X10^3/UL (ref 1.8–7.7)
NEUTROPHILS NFR BLD AUTO: 82 % (ref 31–73)
PHOSPHATE SERPL-MCNC: 3.8 MG/DL (ref 2.6–4.7)
PLATELET # BLD AUTO: 343 X10^3/UL (ref 140–400)
POTASSIUM SERPL-SCNC: 3.6 MMOL/L (ref 3.5–5.1)
RBC # BLD AUTO: 3.19 X10^6/UL (ref 3.5–5.4)
SODIUM SERPL-SCNC: 144 MMOL/L (ref 136–145)
TRIGL SERPL-MCNC: 148 MG/DL (ref 0–150)
WBC # BLD AUTO: 15.6 X10^3/UL (ref 4–11)

## 2020-04-23 RX ADMIN — DEXTROSE SCH MLS/HR: 50 INJECTION, SOLUTION INTRAVENOUS at 09:18

## 2020-04-23 RX ADMIN — DEXMEDETOMIDINE HYDROCHLORIDE PRN MLS/HR: 100 INJECTION, SOLUTION, CONCENTRATE INTRAVENOUS at 10:13

## 2020-04-23 RX ADMIN — INSULIN LISPRO SCH UNITS: 100 INJECTION, SOLUTION INTRAVENOUS; SUBCUTANEOUS at 12:00

## 2020-04-23 RX ADMIN — MEROPENEM SCH MLS/HR: 500 INJECTION, POWDER, FOR SOLUTION INTRAVENOUS at 21:12

## 2020-04-23 RX ADMIN — ONDANSETRON PRN MG: 2 INJECTION INTRAMUSCULAR; INTRAVENOUS at 18:00

## 2020-04-23 RX ADMIN — HEPARIN SODIUM SCH UNIT: 5000 INJECTION, SOLUTION INTRAVENOUS; SUBCUTANEOUS at 09:35

## 2020-04-23 RX ADMIN — DEXMEDETOMIDINE HYDROCHLORIDE PRN MLS/HR: 100 INJECTION, SOLUTION, CONCENTRATE INTRAVENOUS at 05:05

## 2020-04-23 RX ADMIN — DEXMEDETOMIDINE HYDROCHLORIDE PRN MLS/HR: 100 INJECTION, SOLUTION, CONCENTRATE INTRAVENOUS at 02:03

## 2020-04-23 RX ADMIN — FENTANYL CITRATE PRN MCG: 50 INJECTION INTRAMUSCULAR; INTRAVENOUS at 12:01

## 2020-04-23 RX ADMIN — Medication PRN EACH: at 12:18

## 2020-04-23 RX ADMIN — MEROPENEM SCH MLS/HR: 500 INJECTION, POWDER, FOR SOLUTION INTRAVENOUS at 09:18

## 2020-04-23 RX ADMIN — ONDANSETRON PRN MG: 2 INJECTION INTRAMUSCULAR; INTRAVENOUS at 09:17

## 2020-04-23 RX ADMIN — ACETAMINOPHEN PRN MG: 650 SUPPOSITORY RECTAL at 12:18

## 2020-04-23 RX ADMIN — INSULIN LISPRO SCH UNITS: 100 INJECTION, SOLUTION INTRAVENOUS; SUBCUTANEOUS at 06:00

## 2020-04-23 RX ADMIN — PROCHLORPERAZINE EDISYLATE PRN MG: 5 INJECTION INTRAMUSCULAR; INTRAVENOUS at 14:11

## 2020-04-23 RX ADMIN — INSULIN LISPRO SCH UNITS: 100 INJECTION, SOLUTION INTRAVENOUS; SUBCUTANEOUS at 00:00

## 2020-04-23 RX ADMIN — DEXMEDETOMIDINE HYDROCHLORIDE PRN MLS/HR: 100 INJECTION, SOLUTION, CONCENTRATE INTRAVENOUS at 06:53

## 2020-04-23 RX ADMIN — DEXMEDETOMIDINE HYDROCHLORIDE PRN MLS/HR: 100 INJECTION, SOLUTION, CONCENTRATE INTRAVENOUS at 15:54

## 2020-04-23 RX ADMIN — DEXMEDETOMIDINE HYDROCHLORIDE PRN MLS/HR: 100 INJECTION, SOLUTION, CONCENTRATE INTRAVENOUS at 18:26

## 2020-04-23 RX ADMIN — INSULIN LISPRO SCH UNITS: 100 INJECTION, SOLUTION INTRAVENOUS; SUBCUTANEOUS at 18:00

## 2020-04-23 RX ADMIN — HEPARIN SODIUM SCH UNIT: 5000 INJECTION, SOLUTION INTRAVENOUS; SUBCUTANEOUS at 21:11

## 2020-04-23 RX ADMIN — DEXMEDETOMIDINE HYDROCHLORIDE PRN MLS/HR: 100 INJECTION, SOLUTION, CONCENTRATE INTRAVENOUS at 21:15

## 2020-04-23 RX ADMIN — PANTOPRAZOLE SODIUM SCH MG: 40 INJECTION, POWDER, FOR SOLUTION INTRAVENOUS at 09:16

## 2020-04-23 RX ADMIN — FENTANYL CITRATE PRN MCG: 50 INJECTION INTRAMUSCULAR; INTRAVENOUS at 20:31

## 2020-04-23 RX ADMIN — DAPTOMYCIN SCH MLS/HR: 500 INJECTION, POWDER, LYOPHILIZED, FOR SOLUTION INTRAVENOUS at 12:25

## 2020-04-23 RX ADMIN — FENTANYL CITRATE PRN MCG: 50 INJECTION INTRAMUSCULAR; INTRAVENOUS at 02:01

## 2020-04-23 RX ADMIN — DEXMEDETOMIDINE HYDROCHLORIDE PRN MLS/HR: 100 INJECTION, SOLUTION, CONCENTRATE INTRAVENOUS at 12:26

## 2020-04-23 NOTE — PDOC
PROGRESS NOTES


Assessment


Assessment


Respiratory failure.


Seizure.


Metabolic encephalopathy.


Fever 102.7 degree, recurrent fever noted.


Metabolic acidosis.


Diffuse pulmonary infiltrate.


Pleural effusion.


Pancreatitis, necrotizing.


Gallstone.


Leukocytosis.


Lymphopenia.


Electrolytes imbalances.


Hyperglycemia.


DM.


HTN.


HLD.


Anemia.


Abnormal CXR.


Obesity.


Covid-19 still suspected though 1st test negative.





RECOMMENDATIONS/PLAN:


Continue life support in CCU at the present time.


Keppra if has further seizures.


Treat medical diseases.


OT/PT.





EEG: No seizure activity.





OBJECTIVE:


4/23/20: No seizures reported over night. T: 101.2 degree.





Past Medical History


Cardiovascular:  HTN, Hyperlipidemia


Endocrine:  Diabetes





Family History


Unobtainable.





Social HistoryU


not obtainable





Allergies


Coded Allergies:  


Codeine (Verified  Allergy, Intermediate, rash, 3/16/20)





ROS


Unobtainable.





NEUROLOGICAL  EXAMINATION:


Awake from time to time. 


Able to follow some commands.


Not oriented to time, place and person.


PERRL.


EOMI not examined.


CN: no acute focal findings.


Muscle tone: Decreased.


Muscle strength: 3+ UE, 2+ LE.


DTR: 1-2.


Plantar reflex: Neutral response bilaterally 


Gait: not able to walk.


Sensory exam: no response to stimuli..


Not able to access cerebellar signs.


F-T-N test not performed. 


Patient examined in CCU.





Objective


Objective





Vital Signs








  Date Time  Temp Pulse Resp B/P (MAP) Pulse Ox O2 Delivery O2 Flow Rate FiO2


 


4/23/20 12:01     100  6.0 


 


4/23/20 11:38      Ventilator  


 


4/23/20 10:00  108 21 152/89 (110)    


 


4/23/20 09:00 101.2       





 101.2       














Intake and Output 


 


 4/23/20





 07:00


 


Intake Total 4517 ml


 


Output Total 2565 ml


 


Balance 1952 ml


 


 


 


Intake Oral 0 ml


 


IV Total 2502 ml


 


Blood Product IV Normal Saline Flush 2015 ml


 


Output Urine Total 2565 ml











Vitals Signs


Vitals





                          VS - Last 72 Hours, by Label








  Date Time  Temp Pulse Resp B/P (MAP) Pulse Ox O2 Delivery O2 Flow Rate FiO2


 


4/23/20 12:01     100  6.0 


 


4/23/20 11:38     100 Ventilator  


 


4/23/20 10:00  108 21 152/89 (110) 100 Ventilator  


 


4/23/20 09:44     98 Ventilator  


 


4/23/20 09:00 101.2 100 21 170/90 (116) 100 Ventilator  





 101.2       


 


4/23/20 08:00  84 22 123/66 (85) 98 Ventilator  


 


4/23/20 08:00      Mechanical Ventilator  


 


4/23/20 07:26     98 Ventilator  


 


4/23/20 07:00  100 24 145/82 (103) 98 Ventilator  


 


4/23/20 06:00 101.0 144 26 161/89 (113) 97 Ventilator  





 101.0       


 


4/23/20 05:42     98 Ventilator  


 


4/23/20 05:00  86 24 152/86 (108) 100 Ventilator  


 


4/23/20 04:00 100.0 82 25 130/71 (90) 98 Ventilator  





 100.0       


 


4/23/20 04:00      Mechanical Ventilator  


 


4/23/20 03:38     98 Ventilator  


 


4/23/20 03:00  84 23 114/63 (80) 97 Ventilator  


 


4/23/20 02:01   25  99 Ventilator  


 


4/23/20 02:00  89 24 158/87 (110) 98 Ventilator  


 


4/23/20 01:00  100 22 137/88 (104) 100 Ventilator  


 


4/23/20 00:23     98 Ventilator  


 


4/23/20 00:00 100.2 82 22 110/59 (76) 98 Ventilator  





 100.2       


 


4/23/20 00:00      Mechanical Ventilator  


 


4/22/20 23:00  96 22 138/81 (100) 99 Ventilator  


 


4/22/20 22:32     98 Ventilator  


 


4/22/20 22:00 99.6 104 24 154/79 (104) 100 Ventilator  





 99.6       


 


4/22/20 21:00  82 21 111/63 (79) 98 Ventilator  


 


4/22/20 20:00 100.3 96 25 123/73 (90) 98 Ventilator  





 100.3       


 


4/22/20 20:00      Mechanical Ventilator  


 


4/22/20 19:55     98 Ventilator  


 


4/22/20 19:00  105 22 154/81 (105) 98 Ventilator  


 


4/22/20 18:00  102 24 133/66 (88) 99 Ventilator  


 


4/22/20 17:58     99 Ventilator  


 


4/22/20 17:00  127 27 173/102 (125) 100 Ventilator  


 


4/22/20 16:00 101.5 90 27 120/62 (81) 98 Ventilator  





 101.5       


 


4/22/20 16:00      Mechanical Ventilator  


 


4/22/20 15:46     97 Ventilator  


 


4/22/20 15:00  110 23 178/92 (120) 97 Ventilator  


 


4/22/20 14:53   35  100 Ventilator  


 


4/22/20 14:20   44  94   


 


4/22/20 14:00  171 48 196/103 (134) 94 Ventilator  


 


4/22/20 13:36     98 Ventilator  


 


4/22/20 13:11 100.0 91 25 151/80    





 100.0       


 


4/22/20 13:00  91 25 151/80 (103) 100 Ventilator  


 


4/22/20 12:00 100.0 86 24 127/81    





 100.0       


 


4/22/20 12:00 100.0 86 24 127/81 (96) 100 Ventilator  





 100.0       


 


4/22/20 12:00      Mechanical Ventilator  


 


4/22/20 11:38     98 Ventilator  


 


4/22/20 11:37 100.9 98 35 149/92    





 100.9       


 


4/22/20 11:27 100.9 103 23 126/57    





 100.9       


 


4/22/20 11:00  97 23 160/80 (106) 100 Ventilator  


 


4/22/20 10:38 100.2 109 35 180/91    





 100.2       


 


4/22/20 10:15 100.2 108 35 152/88    





 100.2       


 


4/22/20 10:00 100.3 108 32 153/73    





 100.3       


 


4/22/20 10:00 100.3 108 32 153/73 (99) 100 Ventilator  





 100.3       


 


4/22/20 09:45 100.6 102 23 166/77    





 100.6       


 


4/22/20 09:36 100.6 106 31 156/67    





 100.6       


 


4/22/20 09:00  106 31 156/67 (96) 100 Ventilator  


 


4/22/20 08:08     100 Ventilator  


 


4/22/20 08:00      Mechanical Ventilator  


 


4/22/20 08:00 100.6 94  113/62 (79) 100 Ventilator  





 100.6       


 


4/22/20 07:00  90 35 116/59 (78) 100 Ventilator  











Laboratory


Laboratory





Laboratory Tests








Test


 4/22/20


18:19 4/23/20


00:14 4/23/20


06:20 4/23/20


06:23


 


Glucose (Fingerstick)


 146 mg/dL


(70-99) 141 mg/dL


(70-99) 


 134 mg/dL


(70-99)


 


Sodium Level


 


 


 144 mmol/L


(136-145) 





 


Potassium Level


 


 


 3.6 mmol/L


(3.5-5.1) 





 


Chloride Level


 


 


 107 mmol/L


() 





 


Carbon Dioxide Level


 


 


 26 mmol/L


(21-32) 





 


Anion Gap   11 (6-14)  


 


Blood Urea Nitrogen


 


 


 78 mg/dL


(7-20) 





 


Creatinine


 


 


 1.4 mg/dL


(0.6-1.0) 





 


Estimated GFR


(Cockcroft-Gault) 


 


 40.0 


 





 


Glucose Level


 


 


 146 mg/dL


(70-99) 





 


Calcium Level


 


 


 9.4 mg/dL


(8.5-10.1) 





 


Phosphorus Level


 


 


 3.8 mg/dL


(2.6-4.7) 





 


Triglycerides Level


 


 


 148 mg/dL


(0-150) 





 


Test


 4/23/20


06:35 


 


 





 


White Blood Count


 15.6 x10^3/uL


(4.0-11.0) 


 


 





 


Red Blood Count


 3.19 x10^6/uL


(3.50-5.40) 


 


 





 


Hemoglobin


 9.4 g/dL


(12.0-15.5) 


 


 





 


Hematocrit


 28.6 %


(36.0-47.0) 


 


 





 


Mean Corpuscular Volume 90 fL ()    


 


Mean Corpuscular Hemoglobin 30 pg (25-35)    


 


Mean Corpuscular Hemoglobin


Concent 33 g/dL


(31-37) 


 


 





 


Red Cell Distribution Width


 18.4 %


(11.5-14.5) 


 


 





 


Platelet Count


 343 x10^3/uL


(140-400) 


 


 





 


Neutrophils (%) (Auto) 82 % (31-73)    


 


Lymphocytes (%) (Auto) 10 % (24-48)    


 


Monocytes (%) (Auto) 8 % (0-9)    


 


Eosinophils (%) (Auto) 0 % (0-3)    


 


Basophils (%) (Auto) 0 % (0-3)    


 


Neutrophils # (Auto)


 12.8 x10^3/uL


(1.8-7.7) 


 


 





 


Lymphocytes # (Auto)


 1.5 x10^3/uL


(1.0-4.8) 


 


 





 


Monocytes # (Auto)


 1.3 x10^3/uL


(0.0-1.1) 


 


 





 


Eosinophils # (Auto)


 0.0 x10^3/uL


(0.0-0.7) 


 


 





 


Basophils # (Auto)


 0.0 x10^3/uL


(0.0-0.2) 


 


 











Microbiology


4/15/20 Aerobic and Anaerobic Culture - Final, Complete


          


4/15/20 Anaerobic Culture Result 1 ([facility]) - Final, Complete


          


4/15/20 Aerobic Culture - Final, Complete


          


4/15/20 Aerobic Culture Result 1 ([facility]) - Final, Complete


          


4/15/20 Gram Stain - Final, Complete


          


4/15/20 Gram Stain Result 1 ([facility]SON) - Final, Complete


          


4/15/20 Gram Stain Result 2 ([facility]) - Final, Complete


          


4/14/20 Blood Culture - Final, Complete


          NO GROWTH AFTER 5 DAYS


4/12/20 Urine Culture - Final, Complete


          


4/12/20 Urine Culture Result 1 ([facility]) - Final, Complete





Medication


Medications





Current Medications


Daptomycin 430 mg/ Sodium Chloride 50 ml @  100 mls/hr Q24H IV  Last 

administered on 4/23/20at 12:25;  Start 4/22/20 at 13:00


Daptomycin 430 mg/ Sodium Chloride 50 ml @  100 mls/hr Q48H IV ;  Start 4/23/20 

at 09:00;  Stop 4/22/20 at 11:55;  Status DC


Sodium Chloride 100 meq/Potassium Chloride 40 meq/ Magnesium Sulfate 20 

meq/Calcium Gluconate 10 meq/ Multivitamins 10 ml/Chromium/ Copper/Manganese/ 

Seleni/Zn 0.5 ml/ Insulin Human Regular 35 unit/ Total Parenteral 

Nutrition/Amino Acids/Dextrose/ Fat Emulsion Intravenous 1,400 ml @  58.333 mls/

hr TPN  CONT IV ;  Start 4/23/20 at 22:00;  Stop 4/24/20 at 21:59


Sodium Chloride 100 meq/Potassium Chloride 40 meq/ Magnesium Sulfate 20 

meq/Calcium Gluconate 15 meq/ Multivitamins 10 ml/Chromium/ Copper/Manganese/ 

Seleni/Zn 0.5 ml/ Insulin Human Regular 35 unit/ Total Parenteral 

Nutrition/Amino Acids/Dextrose/ Fat Emulsion Intravenous 1,400 ml @  58.333 mls/

hr TPN  CONT IV  Last administered on 4/22/20at 22:27;  Start 4/22/20 at 22:00; 

Stop 4/23/20 at 21:59





Comment


Review of Relevant


I have reviewed the following items josy (where applicable) has been applied.











DORYS LANDA MD                 Apr 23, 2020 12:34

## 2020-04-23 NOTE — PDOC
Infectious Disease Note


Subjective


Subjective


Not feeling well, abd pain present


Remains on vent via trach,


TPN 


cont to be febrile





ROS


ROS


no n/v/d/





Vital Sign


Vital Signs





Vital Signs








  Date Time  Temp Pulse Resp B/P (MAP) Pulse Ox O2 Delivery O2 Flow Rate FiO2


 


4/23/20 07:26     98 Ventilator  


 


4/23/20 06:00 101.0 144 26 161/89 (113)    





 101.0       











Physical Exam


PHYSICAL EXAM


GENERAL: Sleeping awakens barely on Precedex


HEENT: Pupils equal, + NGT, oral cavity dry 


NECK:  Trach/vent 


LUNGS: rhonchi 


HEART:  S1, S2, regular 


ABDOMEN: Distended, tender, hypoactive BS, 


: [place] (4/14)


EXTREMITIES: Generalized edema, no cyanosis, SCDs bilaterally 


DERMATOLOGIC:  Warm and dry.  No generalized rash.  


CENTRAL NERVOUS SYSTEM: Extremely weak trying to talk but unable to understand


HDC & LIJ (4/14) clean





Labs


Lab





Laboratory Tests








Test


 4/22/20


12:19 4/22/20


18:19 4/23/20


00:14 4/23/20


06:20


 


Glucose (Fingerstick)


 146 mg/dL


(70-99) 146 mg/dL


(70-99) 141 mg/dL


(70-99) 





 


Sodium Level


 


 


 


 144 mmol/L


(136-145)


 


Potassium Level


 


 


 


 3.6 mmol/L


(3.5-5.1)


 


Chloride Level


 


 


 


 107 mmol/L


()


 


Carbon Dioxide Level


 


 


 


 26 mmol/L


(21-32)


 


Anion Gap    11 (6-14) 


 


Blood Urea Nitrogen


 


 


 


 78 mg/dL


(7-20)


 


Creatinine


 


 


 


 1.4 mg/dL


(0.6-1.0)


 


Estimated GFR


(Cockcroft-Gault) 


 


 


 40.0 





 


Glucose Level


 


 


 


 146 mg/dL


(70-99)


 


Calcium Level


 


 


 


 9.4 mg/dL


(8.5-10.1)


 


Phosphorus Level


 


 


 


 3.8 mg/dL


(2.6-4.7)


 


Triglycerides Level


 


 


 


 148 mg/dL


(0-150)


 


Test


 4/23/20


06:23 4/23/20


06:35 


 





 


Glucose (Fingerstick)


 134 mg/dL


(70-99) 


 


 





 


White Blood Count


 


 15.6 x10^3/uL


(4.0-11.0) 


 





 


Red Blood Count


 


 3.19 x10^6/uL


(3.50-5.40) 


 





 


Hemoglobin


 


 9.4 g/dL


(12.0-15.5) 


 





 


Hematocrit


 


 28.6 %


(36.0-47.0) 


 





 


Mean Corpuscular Volume  90 fL ()   


 


Mean Corpuscular Hemoglobin  30 pg (25-35)   


 


Mean Corpuscular Hemoglobin


Concent 


 33 g/dL


(31-37) 


 





 


Red Cell Distribution Width


 


 18.4 %


(11.5-14.5) 


 





 


Platelet Count


 


 343 x10^3/uL


(140-400) 


 





 


Neutrophils (%) (Auto)  82 % (31-73)   


 


Lymphocytes (%) (Auto)  10 % (24-48)   


 


Monocytes (%) (Auto)  8 % (0-9)   


 


Eosinophils (%) (Auto)  0 % (0-3)   


 


Basophils (%) (Auto)  0 % (0-3)   


 


Neutrophils # (Auto)


 


 12.8 x10^3/uL


(1.8-7.7) 


 





 


Lymphocytes # (Auto)


 


 1.5 x10^3/uL


(1.0-4.8) 


 





 


Monocytes # (Auto)


 


 1.3 x10^3/uL


(0.0-1.1) 


 





 


Eosinophils # (Auto)


 


 0.0 x10^3/uL


(0.0-0.7) 


 





 


Basophils # (Auto)


 


 0.0 x10^3/uL


(0.0-0.2) 


 











Micro


BC neg


Abd culture neg, cell count not done





Objective


Assessment


Leukocytosis 4/10 -improved 


Fever


   - New left IJ/[place] 4/14. PICC removed 4/14


   -? pancreatitis. blood cults 4/10 neg. Urine - neg, 


Ascites s/p paracentesis 4/15 - 4300 ml. cultures neg to date 


Severe acute gallstone pancreatitis with necrosis


   -CT 4/14 necrotizing pancreatitis with fluid and phlegmon at the pancreas 


Hypotension off levaphed 


Julian Pleural effusions


JED requiring HD 


   - s/p RIJ temporary dialysis catheter replacement, 4/2


Vent dependent respiratory failure


   -s/p Trach placement 


   -lung opacities, COVID-19 neg 


Anasarca - worse


Anemia - S/p PRBC 3/26


Hypocalcemia 


Prediabetes


HTN


Diarrhea, C. diff neg 3/23


Anemia - S/p PRBCs





Plan


Plan of Care


cont merrem (4/8) and ,micafungin,  and dapto





Gen surgery following


Maintain aspiration precautions 


Anemia deferred to primary


CBC in am





Critically ill





zyvox changed to dapto , to rule out serotonin sy causing fever,


I think sec to cont fever, and broad coverage, need to consider pancreatic 

necrosectomy


d/w surgery and GI


repeat ct noted


drug fever is possible , though less likely , no rash, no eosinophilia, and pt 

had beta lactam before


D/w nursing











LINN FRANZ MD               Apr 23, 2020 09:15

## 2020-04-23 NOTE — PDOC
Subjective:


Subjective:


Indicates pain.





Objective:


Objective:


D/w Dr. Davis.


D/w nurse - no significant change though will not need to dialyze today.


Vital Signs:





                                   Vital Signs








  Date Time  Temp Pulse Resp B/P (MAP) Pulse Ox O2 Delivery O2 Flow Rate FiO2


 


4/23/20 07:26     98 Ventilator  


 


4/23/20 06:00 101.0 144 26 161/89 (113)    





 101.0       








Labs:





Laboratory Tests








Test


 4/22/20


12:19 4/22/20


18:19 4/23/20


00:14 4/23/20


06:20


 


Glucose (Fingerstick) 146 mg/dL  146 mg/dL  141 mg/dL  


 


Sodium Level    144 mmol/L 


 


Potassium Level    3.6 mmol/L 


 


Chloride Level    107 mmol/L 


 


Carbon Dioxide Level    26 mmol/L 


 


Anion Gap    11 


 


Blood Urea Nitrogen    78 mg/dL 


 


Creatinine    1.4 mg/dL 


 


Estimated GFR


(Cockcroft-Gault) 


 


 


 40.0 





 


Glucose Level    146 mg/dL 


 


Calcium Level    9.4 mg/dL 


 


Phosphorus Level    3.8 mg/dL 


 


Triglycerides Level    148 mg/dL 


 


Test


 4/23/20


06:23 4/23/20


06:35 


 





 


Glucose (Fingerstick) 134 mg/dL    


 


White Blood Count  15.6 x10^3/uL   


 


Red Blood Count  3.19 x10^6/uL   


 


Hemoglobin  9.4 g/dL   


 


Hematocrit  28.6 %   


 


Mean Corpuscular Volume  90 fL   


 


Mean Corpuscular Hemoglobin  30 pg   


 


Mean Corpuscular Hemoglobin


Concent 


 33 g/dL 


 


 





 


Red Cell Distribution Width  18.4 %   


 


Platelet Count  343 x10^3/uL   


 


Neutrophils (%) (Auto)  82 %   


 


Lymphocytes (%) (Auto)  10 %   


 


Monocytes (%) (Auto)  8 %   


 


Eosinophils (%) (Auto)  0 %   


 


Basophils (%) (Auto)  0 %   


 


Neutrophils # (Auto)  12.8 x10^3/uL   


 


Lymphocytes # (Auto)  1.5 x10^3/uL   


 


Monocytes # (Auto)  1.3 x10^3/uL   


 


Eosinophils # (Auto)  0.0 x10^3/uL   


 


Basophils # (Auto)  0.0 x10^3/uL   








Imaging:


CT A/P


IMPRESSION:


1. Pancreas again appears diffusely enlarged and hypodense compatible with 

diffuse pancreatitis and pancreatic necrosis.


2. Extensive retroperitoneal fluid collections are again identified, slightly 

larger in size.


3. Moderate to large volume of abdominal and pelvic ascites is again identified.

Mild generalized increase in fatty inflammation or anasarca.


4. Cholelithiasis with possible cholecystitis.


5. Moderate pleural effusions, may be slightly improved. Infiltrate/atelectasis 

in both lung bases.


6. Small hepatic low-density lesion is too small to characterize, may represent 

a small cyst, likely unchanged.





PE:





GEN: ill


LUNGS: trach/vent, diminished


HEART: tachycardic


ABD: distended, uncomfortable


NEURO/PSYCH: waves hello





A/P:


Gallstone pancreatitis w/ necrosis, MOSF, fever





--


Interval CT as above.


Tentatively plans for surgery next week.





Hemodynamically unstable?:  No


Is patient in severe pain?:  No


Is NPO status required?:  Yes











RAGHAVENDRA MURCIA         Apr 23, 2020 09:18

## 2020-04-23 NOTE — NUR
Pt has slept for longer periods tonight. Has rubbed her stomach and nods yes to pain in 
abdomen. Fentanyl was give times 2 at patients request. All tubes patent but patient gets 
very anxious when Trach care is done or when NG placement is verified. Boyfriend, Rolf, was 
here for about 1 hour last night and patient was awake and responding to him.

## 2020-04-23 NOTE — PDOC
[facility] PROGRESS NOTE


Chief Complaint


Chief Complaint


Respiratory failure requiring mechanical ventilation (on vent since 3/23)


Tracheostomy


bilateral pleural effusions/pulm edema 


Sepsis


Severe Acute gallstone pancreatitis (not a surgical candidate at this time) with

necrosis


Acute kidney failure now requiring dialysis


Salpingitis


Gallstones (Calculus of gallbladder with acute cholecystitis without 

obstruction)


HTN 


Leukocytosis 


Hypoxia


Uterine fibroid


Intractable pain


Intractable nausea


Covid 19 negative. 


Acute on chronic anemia 


EEG: No seizure activity.


ESRD on HD


Hyperglycemia, persistently in 200s





History of Present Illness


History of Present Illness


4-


Patient seen and examined in the ICU


She remains mechanically ventilated


AC/18/4 50/30% FiO2


Chart reviewed


Discussed with RN


She is critically ill











4-


Patient seen and examined in the ICU


She has a tracheostomy


AC/18/4 50/30%


Chart reviewed


Discussed with RN


She remains extremely critically ill








4-


Patient seen in ICU room 116 she is currently on dialysis


Tries to mumble is grabbing at her face with her left hand


She is extremely weak cannot communicate well


She is swollen


Chart reviewed


Discussed with RN


Very critically ill











4- patient seen and examined in the ICU





She is on the vent via tracheostomy


AC/14/4 50/35%


Has IV TPN


NG to suction


Wearing mitts for patient safety


Still seems encephalopathic


Chart reviewed


Discussed with RN


She remains critically ill











Ms Tadeo is a 48yo F w/ PMHx HTN, prediabetes who presented to the emergency 

room with complaints of abdominal pain on 3/16/2020. Found with Lipase 88778, 

, , Bilirubin 1.4.


CT abdomen confirms pancreatic inflammation, peripancreatic fluid and 

inflammatory changes around the pancreas consistent with pancreatitis. 

Cholelithiasis and 1.4cm uterine fibroid as well as possible left salpingitis. 

Admitted for further care


GI, General surgery, ID, Pulm consulted.





3/17: No urine output. Added dilaudid for pain, PICC placed per IR. Renal US 

negative.Seen bedside in ICU, given 2L additional NSS and albumin infusion. 

Still hypotensive, started on levophed. Repeat CT abdomen w/ necrosis. 


3/18: O2 saturation 87% on nasal cannula oxygen. Dialysis catheter per 

nephrology


3/19: She is now on BiPAP appears more ill, now on dialysis


3/20: Seen on BiPAP. Her mother and another family member are present and seemed

to be good support for her. Currently on dialysis. Appears critically ill


3/21: Overnight Tmax 101.7 , still on BiPAP FiO2 40%, still on low dose Levophed

gtt, TPN initiated. On dialysis


4/6: Tracheostomy


4/12: S/p tracheostomy on vent spontaneous respirations with 5 of pressure 

support 35% FiO2, rectal tube and a [place], off pressors


4/13: Off pressors. Seen on dialysis this morning. BUN 80. Tracking with her 

eyes. Still on vent via trach.


4/14: BUN 68, Cr 1.7. Temp 100.2F axillary. WBC 9.8. Still on vent via trach. 

Tracking reasonably well. In obvious discomfort. Removed PICC and CVC LIJ and 

replaced. Tips sent for culture. CT chest/abd/pelvis with bilateral pleural 

effusion and ascites.


4/15: Renal function stable. Still on vent. More interactive today. Miming wish 

for food. Plan discussed for thoracentesis/paracentesis with daughters today. 

They were under impression patient was doing worse due to a miscommunication 

which has been clarified over the phone. 4.3L removed.


4/16: BUN 88, Cr 1.8. Much more interactive today. Appears more comfortable 

today.


4/17: Febrile overnight 101.8F. More interactive, still on vent. Asking for ice 

by miming.


4/18: Afebrile overnight. TMax last 24 hours 100.6F. Hb 7.1. Interactive when 

awake.





Afebrile. Hb 7. Not tolerating vent wean. Very interactive, on low dose 

precedex. Excellent UOP over the past 72 hours





Plan:


Cont vent weaning, dialysis


Trach shield during day if ok with pulm. Would recommend ABG after 4 hours to 

r/o CO2 retention, however and still vent overnight





Vitals/I&O


Vitals/I&O:





                                   Vital Signs








  Date Time  Temp Pulse Resp B/P (MAP) Pulse Ox O2 Delivery O2 Flow Rate FiO2


 


4/23/20 10:00  108 21 152/89 (110) 100 Ventilator  


 


4/23/20 09:00 101.2       





 101.2       














                                    I & O   


 


 4/22/20 4/22/20 4/23/20





 15:00 23:00 07:00


 


Intake Total 2315 ml 1650 ml 552 ml


 


Output Total 1065 ml 820 ml 680 ml


 


Balance 1250 ml 830 ml -128 ml











Physical Exam


Physical Exam:


GENERAL: Sleeping awakens barely on Precedex


HEENT: Pupils equal, + NGT, oral cavity dry 


NECK:  Trach/vent 


LUNGS: rhonchi 


HEART:  S1, S2, regular 


ABDOMEN: Distended, tender, hypoactive BS, 


: [place] (4/14)


EXTREMITIES: Generalized edema, no cyanosis, SCDs bilaterally 


DERMATOLOGIC:  Warm and dry.  No generalized rash.  


CENTRAL NERVOUS SYSTEM: Extremely weak trying to talk but unable to understand


HDC & LIJ (4/14) clean


General:  Other (opens eyes on exam)


Heart:  Regular rate, Normal S1, Other (increased rate)


Lungs:  Crackles, Other (dimished in BLL  nc at perrl   nose clear   neck trach 

site ok   no lad  no thyromegaly)


Abdomen:  Other (distended, TTP on exam)


Extremities:  Other (ANASARCA)


Skin:  Other (mottling noted to extremities )





Labs


Labs:





Laboratory Tests








Test


 4/22/20


12:19 4/22/20


18:19 4/23/20


00:14 4/23/20


06:20


 


Glucose (Fingerstick)


 146 mg/dL


(70-99) 146 mg/dL


(70-99) 141 mg/dL


(70-99) 





 


Sodium Level


 


 


 


 144 mmol/L


(136-145)


 


Potassium Level


 


 


 


 3.6 mmol/L


(3.5-5.1)


 


Chloride Level


 


 


 


 107 mmol/L


()


 


Carbon Dioxide Level


 


 


 


 26 mmol/L


(21-32)


 


Anion Gap    11 (6-14) 


 


Blood Urea Nitrogen


 


 


 


 78 mg/dL


(7-20)


 


Creatinine


 


 


 


 1.4 mg/dL


(0.6-1.0)


 


Estimated GFR


(Cockcroft-Gault) 


 


 


 40.0 





 


Glucose Level


 


 


 


 146 mg/dL


(70-99)


 


Calcium Level


 


 


 


 9.4 mg/dL


(8.5-10.1)


 


Phosphorus Level


 


 


 


 3.8 mg/dL


(2.6-4.7)


 


Triglycerides Level


 


 


 


 148 mg/dL


(0-150)


 


Test


 4/23/20


06:23 4/23/20


06:35 


 





 


Glucose (Fingerstick)


 134 mg/dL


(70-99) 


 


 





 


White Blood Count


 


 15.6 x10^3/uL


(4.0-11.0) 


 





 


Red Blood Count


 


 3.19 x10^6/uL


(3.50-5.40) 


 





 


Hemoglobin


 


 9.4 g/dL


(12.0-15.5) 


 





 


Hematocrit


 


 28.6 %


(36.0-47.0) 


 





 


Mean Corpuscular Volume  90 fL ()   


 


Mean Corpuscular Hemoglobin  30 pg (25-35)   


 


Mean Corpuscular Hemoglobin


Concent 


 33 g/dL


(31-37) 


 





 


Red Cell Distribution Width


 


 18.4 %


(11.5-14.5) 


 





 


Platelet Count


 


 343 x10^3/uL


(140-400) 


 





 


Neutrophils (%) (Auto)  82 % (31-73)   


 


Lymphocytes (%) (Auto)  10 % (24-48)   


 


Monocytes (%) (Auto)  8 % (0-9)   


 


Eosinophils (%) (Auto)  0 % (0-3)   


 


Basophils (%) (Auto)  0 % (0-3)   


 


Neutrophils # (Auto)


 


 12.8 x10^3/uL


(1.8-7.7) 


 





 


Lymphocytes # (Auto)


 


 1.5 x10^3/uL


(1.0-4.8) 


 





 


Monocytes # (Auto)


 


 1.3 x10^3/uL


(0.0-1.1) 


 





 


Eosinophils # (Auto)


 


 0.0 x10^3/uL


(0.0-0.7) 


 





 


Basophils # (Auto)


 


 0.0 x10^3/uL


(0.0-0.2) 


 














Assessment and Plan


Assessmemt and Plan


Problems


Medical Problems:


(1) Acute pancreatitis


Status: Acute  





(2) Cholelithiasis


Status: Acute  





Respiratory failure requiring intubation


Status post tracheostomy


Severe Acute gallstone pancreatitis (she is not a surgical candidate as she is 

too ill)


Acute kidney failure now requiring dialysis


Salpingo--itis


Gallstones (Calculus of gallbladder with acute cholecystitis without 

obstruction)


HTN 


Leukocytosis 


Hypoxia


Uterine fibroid


Hypoxia with respiratory failure


Intractable pain


Intractable nausea





Plan


ICU monitoring


Tracheostomy care


NG suctioning


Vent management per pulm. pressure support as tolerated


Dialysis per nephro


Merrem (4/8) and Zyvox (4/10) and micafungin 


Follow cultures


Trach care


Nutritional support


When necessary levo fed


Neuro following


No plans for sx at present as she is to ill still


Discharge disposition pending (? Eventual LTAC)


She is still critically ill


Prognosis very guarded


Total time 34 min





Comment


Review of Relevant


I have reviewed the following items josy (where applicable) has been applied.


Medications:





Current Medications








 Medications


  (Trade)  Dose


 Ordered  Sig/Yee


 Route


 PRN Reason  Start Time


 Stop Time Status Last Admin


Dose Admin


 


 Sodium Chloride


 100 meq/Potassium


 Chloride 40 meq/


 Magnesium Sulfate


 20 meq/Calcium


 Gluconate 15 meq/


 Multivitamins 10


 ml/Chromium/


 Copper/Manganese/


 Seleni/Zn 0.5 ml/


 Insulin Human


 Regular 35 unit/


 Total Parenteral


 Nutrition/Amino


 Acids/Dextrose/


 Fat Emulsion


 Intravenous  1,400 ml @ 


 58.333 mls/


 hr  TPN  CONT


 IV


   4/22/20 22:00


 4/23/20 21:59  4/22/20 22:27





 


 Daptomycin 430 mg/


 Sodium Chloride  50 ml @ 


 100 mls/hr  Q24H


 IV


   4/22/20 13:00


    4/22/20 12:32














Hemodynamically unstable?:  No


Is patient in severe pain?:  No


Is NPO status required?:  Yes











HECTOR MASON III DO           Apr 23, 2020 11:30

## 2020-04-23 NOTE — NUR
SS following up with discharge planning. SS reviewed pt chart and discussed with [facility] and 
pt's RN. Pt remains on vent, trach, TPN, and IV Daptomycin. Pt tentatively scheduled for 
surgery next week for pancreatic necrosectomy and cholecystectomy. [facility]CFS reported that family 
is no longer cooperating but were able to get some financial information from pt's 
daughters. They reported that pt has several assets and is over income and will not qualify 
for Medicaid. They reported that they are closing there case. Pt remains self pay. SS will 
continue to follow for discharge planning.

## 2020-04-23 NOTE — PDOC
SUBJECTIVE


ROS


stable





OBJECTIVE


Vital Signs





Vital Signs








  Date Time  Temp Pulse Resp B/P (MAP) Pulse Ox O2 Delivery O2 Flow Rate FiO2


 


4/23/20 07:26     98 Ventilator  


 


4/23/20 06:00 101.0 144 26 161/89 (113)    





 101.0       








I & 0











Intake and Output 


 


 4/23/20





 07:00


 


Intake Total 4517 ml


 


Output Total 2565 ml


 


Balance 1952 ml


 


 


 


Intake Oral 0 ml


 


IV Total 2502 ml


 


Blood Product IV Normal Saline Flush 2015 ml


 


Output Urine Total 2565 ml











PHYSICAL EXAM


Physical Exam


GENERAL: awake,  alert 


HEENT: + NGT, 


NECK:  Trach/vent 


LUNGS: rhonchi 


HEART:  S1, S2, regular 


ABDOMEN: Distended, tender, hypoactive BS, 


: [place] (4/14)


EXTREMITIES: Generalized edema, no cyanosis, 


DERMATOLOGIC:   No generalized rash.  


CENTRAL NERVOUS SYSTEM: Nods appropriately to few questions, 


HDC  (4/14)





DIAGNOSIS/ASSESSMENT


Assessment & Plan


ARF-- ATN,requiring CRRT and HD 


Switched from QD HD to TTS schedule  


Good UOP  , Cr and e-lytes stable , currently on TPN ,  hold off HD today  , re-

eval in am 


 Monitor for renal recovery  , supportive care, avoid nephrotoxins  


 


Anemia-  Currently on YOLI 


Hgb dropped, s/p PRBC ,managed by primary/GI  





Recurrent fever  





Anasarca due to 3rd spacing  stable  





Hyperkalemia- resolved  





AC Pancreatitis, early developing necrosis


No intervention per GS  


CT SCAN 4/22  -   Pancreas again appears diffusely enlarged and hypodense 

compatible with diffuse pancreatitis and pancreatic necrosis.


  Extensive retroperitoneal fluid collections  slightly larger in size,Moderate 

to large volume of abdominal and pelvic ascites is again  identified. Mild 

generalized increase in fatty inflammation or anasarca.





 Cholelithiasis with possible cholecystitis.





Moderate pleural effusions, may be slightly improved. Infiltrate/atelectasis in 

both lung bases.


  


 Ascites - post paracentesis on 4/15-  4300 cc of thin, light brown fluid was 

removed





Acute hypoxic resp failure ,bilateral pleural effusion


  Trach in place , On Vent  


 


HTN 





COVID-19 neg, 3/26





COMMENT/RELEVANT DATA


Meds





Current Medications








 Medications


  (Trade)  Dose


 Ordered  Sig/Yee  Start Time


 Stop Time Status Last Admin


Dose Admin


 


 Acetaminophen


  (Tylenol Supp)  650 mg  PRN Q6HRS  PRN  3/24/20 10:30


    4/22/20 17:01


650 MG


 


 Acetaminophen


  (Tylenol)  650 mg  PRN Q6HRS  PRN  3/21/20 03:36


    4/16/20 19:56


650 MG


 


 Albumin Human  200 ml @ 


 200 mls/hr  1X PRN  PRN  4/21/20 08:00


 4/21/20 13:59 DC 4/21/20 08:40


200 MLS/HR


 


 Albuterol Sulfate


  (Ventolin Neb


 Soln)  2.5 mg  1X  ONCE  3/17/20 22:30


 3/17/20 22:31 DC 3/18/20 00:56


2.5 MG


 


 Alteplase,


 Recombinant


  (Cathflo For


 Central Catheter


 Clearance)  1 mg  1X  ONCE  3/31/20 22:00


 3/31/20 22:01 DC 3/31/20 22:05


1 MG


 


 Amino Acids/


 Glycerin/


 Electrolytes  1,000 ml @ 


 75 mls/hr  [id number]  4/20/20 21:15


   UNV  





 


 Artificial Tears


  (Artificial


 Tears)  1 drop  PRN Q1HR  PRN  3/23/20 08:15


     





 


 Atenolol


  (Tenormin)  100 mg  DAILY  3/17/20 09:00


 3/16/20 20:08 DC  





 


 Atropine Sulfate


  (ATROPINE 0.5mg


 SYRINGE)  0.5 mg  PRN Q5MIN  PRN  4/2/20 08:15


     





 


 Benzocaine


  (Hurricaine One)  1 spray  1X  ONCE  3/20/20 14:30


 3/20/20 14:31 DC 3/20/20 16:38


1 SPRAY


 


 Bupivacaine HCl/


 Epinephrine Bitart


  (Sensorcain-Epi


 0.5%-1:100453 Mpf)  30 ml  STK-MED ONCE  4/6/20 11:00


 4/6/20 11:01 DC 4/6/20 11:44


1 ML


 


 Calcium Carbonate/


 Glycine


  (Tums)  500 mg  PRN [id number]  PRN  3/18/20 17:45


     





 


 Calcium Chloride


 1000 mg/Sodium


 Chloride  110 ml @ 


 220 mls/hr  1X  ONCE  3/17/20 22:30


 3/17/20 22:59 DC 3/17/20 22:11


220 MLS/HR


 


 Calcium Chloride


 3000 mg/Sodium


 Chloride  1,030 ml @ 


 50 mls/hr  [id number]  3/19/20 08:00


 3/21/20 15:23 DC 3/21/20 02:17


50 MLS/HR


 


 Calcium Gluconate


  (Calcium


 Gluconate)  2,000 mg  1X  ONCE  3/19/20 02:15


 3/19/20 02:16 DC 3/19/20 02:19


2,000 MG


 


 Calcium Gluconate


 1000 mg/Sodium


 Chloride  110 ml @ 


 220 mls/hr  1X  ONCE  3/18/20 03:30


 3/18/20 03:59 DC 3/18/20 03:21


220 MLS/HR


 


 Calcium Gluconate


 2000 mg/Sodium


 Chloride  120 ml @ 


 220 mls/hr  1X  ONCE  3/18/20 07:30


 3/18/20 08:02 DC 3/18/20 09:05


220 MLS/HR


 


 Cefepime HCl


  (Maxipime)  2 gm  Q12HR  3/25/20 09:00


 4/8/20 09:58 DC 4/7/20 20:56


2 GM


 


 Cellulose


  (Surgicel


 Fibrillar 1x2)  1 each  STK-MED ONCE  4/6/20 11:00


 4/6/20 11:01 DC  





 


 Cellulose


  (Surgicel


 Hemostat 4x8)  1 each  STK-MED ONCE  4/6/20 11:55


 4/6/20 11:56 DC 4/6/20 11:44


1 EACH


 


 Daptomycin 430 mg/


 Sodium Chloride  50 ml @ 


 100 mls/hr  Q24H  4/22/20 13:00


    4/22/20 12:32


100 MLS/HR


 


 Daptomycin 500 mg/


 Sodium Chloride  50 ml @ 


 100 mls/hr  Q48H  3/25/20 08:30


 4/10/20 10:07 DC 4/10/20 09:57


100 MLS/HR


 


 Dexmedetomidine


 HCl 400 mcg/


 Sodium Chloride  100 ml @ 0


 mls/hr  CONT  PRN  4/2/20 08:15


    4/23/20 06:53


30.5 MLS/HR


 


 Dextrose


  (Dextrose


 50%-Water Syringe)  12.5 gm  PRN Q15MIN  PRN  3/16/20 09:30


     





 


 Digoxin


  (Lanoxin)  125 mcg  1X  ONCE  3/19/20 18:00


 3/19/20 18:01 DC 3/19/20 17:10


125 MCG


 


 Diphenhydramine


 HCl


  (Benadryl)  25 mg  1X PRN  PRN  4/13/20 09:45


 4/14/20 09:44 DC  





 


 Enoxaparin Sodium


  (Lovenox 100mg


 Syringe)  100 mg  Q12HR  4/21/20 21:00


   UNV  





 


 Etomidate


  (Amidate)  8 mg  1X  ONCE  3/23/20 08:30


 3/23/20 08:31 DC 3/23/20 08:33


8 MG


 


 Fentanyl Citrate


  (Fentanyl 2ml


 Vial)  25 mcg  PRN Q2HR  PRN  4/20/20 21:00


     





 


 Furosemide


  (Lasix)  40 mg  1X  ONCE  4/13/20 14:30


 4/13/20 14:31 DC 4/13/20 14:39


40 MG


 


 Heparin Sodium


  (Porcine)


  (Hep Lock Adult)  500 unit  STK-MED ONCE  4/7/20 09:29


 4/7/20 09:30 DC  





 


 Heparin Sodium


  (Porcine)


  (Heparin Sodium)  5,000 unit  Q12HR  4/3/20 21:00


    4/22/20 21:24


5,000 UNIT


 


 Hydromorphone HCl


  (Dilaudid)  1 mg  PRN Q3HRS  PRN  3/17/20 12:00


 3/31/20 00:25 DC 3/23/20 05:13


1 MG


 


 Info


  (CONTRAST GIVEN


 -- Rx MONITORING)  1 each  PRN DAILY  PRN  3/30/20 11:45


 4/1/20 11:44 DC  





 


 Info


  (Icu Electrolyte


 Protocol)  1 ea  CONT PRN  PRN  3/29/20 13:15


     





 


 Info


  (PHARMACY


 MONITORING -- do


 not chart)  1 each  PRN DAILY  PRN  4/21/20 08:00


   UNV  





 


 Info


  (Tpn Per


 Pharmacy)  1 each  PRN DAILY  PRN  3/18/20 12:30


   UNV  





 


 Insulin Human


 Lispro


  (HumaLOG)  0-9 UNITS  Q6HRS  3/16/20 09:30


    4/21/20 00:06


4 UNITS


 


 Insulin Human


 Regular


  (HumuLIN R VIAL)  5 unit  1X  ONCE  3/17/20 22:30


 3/17/20 22:31 DC 3/17/20 22:14


5 UNIT


 


 Iohexol


  (Omnipaque 240


 Mg/ml)  30 ml  1X  ONCE  3/30/20 11:30


 3/30/20 11:33 DC 3/30/20 11:30


30 ML


 


 Iohexol


  (Omnipaque 300


 Mg/ml)  60 ml  1X  ONCE  3/17/20 17:00


 3/17/20 17:01 DC 3/17/20 17:20


60 ML


 


 Iohexol


  (Omnipaque 350


 Mg/ml)  90 ml  1X  ONCE  3/16/20 03:30


 3/16/20 03:31 DC 3/16/20 03:25


90 ML


 


 Ketorolac


 Tromethamine


  (Toradol 30mg


 Vial)  30 mg  1X  ONCE  3/16/20 03:00


 3/16/20 03:01 DC 3/16/20 02:54


30 MG


 


 Lidocaine HCl


  (Buffered


 Lidocaine 1%)  6 ml  1X  ONCE  4/15/20 13:30


 4/15/20 13:31 DC 4/15/20 13:45


9 ML


 


 Lidocaine HCl


  (Glydo


  (Lidocaine) Jelly)  1 thomas  1X  ONCE  3/20/20 14:30


 3/20/20 14:31 DC 3/20/20 16:38


1 THOMAS


 


 Lidocaine HCl


  (Xylocaine-Mpf


 1% 2ml Vial)  2 ml  PRN 1X  PRN  4/6/20 07:00


 4/7/20 06:59 DC  





 


 Linezolid/Dextrose  300 ml @ 


 300 mls/hr  Q12HR  4/10/20 11:00


 4/21/20 08:10 DC 4/20/20 20:40


300 MLS/HR


 


 Lorazepam


  (Ativan Inj)  1 mg  PRN Q4HRS  PRN  3/19/20 09:00


 4/17/20 09:19 DC 4/17/20 03:51


1 MG


 


 Magnesium Sulfate  50 ml @ 25


 mls/hr  PRN DAILY  PRN  4/5/20 09:15


    4/20/20 17:27


25 MLS/HR


 


 Meropenem 1 gm/


 Sodium Chloride  100 ml @ 


 200 mls/hr  Q8HRS  3/17/20 20:00


 3/18/20 08:48 DC 3/18/20 05:45


200 MLS/HR


 


 Meropenem 500 mg/


 Sodium Chloride  50 ml @ 


 100 mls/hr  Q12H  4/8/20 10:00


    4/23/20 09:18


100 MLS/HR


 


 Metoprolol


 Tartrate


  (Lopressor Vial)  5 mg  Q6HRS  3/17/20 10:15


 3/28/20 08:48 DC 3/26/20 00:12


5 MG


 


 Metronidazole  100 ml @ 


 100 mls/hr  Q8HRS  4/14/20 10:00


 4/21/20 08:10 DC 4/21/20 06:04


100 MLS/HR


 


 Micafungin Sodium


 100 mg/Dextrose  100 ml @ 


 100 mls/hr  Q24H  3/23/20 09:00


    4/23/20 09:18


100 MLS/HR


 


 Midazolam HCl


  (Versed)  5 mg  1X  ONCE  3/23/20 08:30


 3/23/20 08:31 DC  





 


 Midazolam HCl 100


 mg/Sodium Chloride  100 ml @ 7


 mls/hr  CONT  PRN  3/28/20 16:00


    4/8/20 15:35


7 MLS/HR


 


 Midazolam HCl 50


 mg/Sodium Chloride  50 ml @ 0


 mls/hr  CONT  PRN  3/23/20 08:15


 3/28/20 15:59 DC 3/26/20 22:39


7 MLS/HR


 


 Morphine Sulfate


  (Morphine


 Sulfate)  2 mg  PRN Q2HR  PRN  3/16/20 05:00


 3/17/20 14:15 DC 3/17/20 12:26


2 MG


 


 Multi-Ingred


 Cream/Lotion/Oil/


 Oint


  (Artificial


 Tears Eye


 Ointment)  1 thomas  PRN Q1HR  PRN  3/25/20 17:30


    4/13/20 08:19


1 THOMAS


 


 Norepinephrine


 Bitartrate 8 mg/


 Dextrose  258 ml @ 


 17.299 mls/


 hr  CONT  PRN  3/17/20 15:30


 4/17/20 09:19 DC 4/14/20 12:48


20.9 MLS/HR


 


 Ondansetron HCl


  (Zofran)  4 mg  PRN Q6HRS  PRN  4/6/20 07:00


 4/7/20 06:59 DC  





 


 Pantoprazole


 Sodium


  (PROTONIX VIAL


 for IV PUSH)  40 mg  DAILYAC  3/16/20 11:30


    4/23/20 09:16


40 MG


 


 Piperacillin Sod/


 Tazobactam Sod


 4.5 gm/Sodium


 Chloride  100 ml @ 


 200 mls/hr  1X  ONCE  3/16/20 06:00


 3/16/20 06:29 DC 3/16/20 05:44


200 MLS/HR


 


 Potassium


 Chloride 15 meq/


 Bicarbonate


 Dialysis Soln w/


 out KCl  5,007.5 ml


  @ 1,000 mls/


 hr  Q5H1M  3/29/20 20:00


 4/2/20 13:08 DC 4/1/20 18:14


1,000 MLS/HR


 


 Potassium


 Chloride 20 meq/


 Bicarbonate


 Dialysis Soln w/


 out KCl  5,010 ml @ 


 1,000 mls/hr  Q5H1M  3/25/20 16:00


 3/29/20 19:59 DC 3/29/20 14:54


1,000 MLS/HR


 


 Potassium


 Chloride/Water  100 ml @ 


 100 mls/hr  1X  ONCE  4/18/20 14:45


 4/18/20 15:44 DC 4/18/20 17:28


100 MLS/HR


 


 Potassium


 Phosphate 20 mmol/


 Sodium Chloride  106.6667


 ml @ 


 51.667 m...  1X  ONCE  3/25/20 13:00


 3/25/20 15:03 DC 3/25/20 12:51


51.667 MLS/HR


 


 Prochlorperazine


 Edisylate


  (Compazine)  5 mg  PACU PRN  PRN  4/6/20 07:00


 4/7/20 06:59 DC  





 


 Propofol  20 ml @ As


 Directed  STK-MED ONCE  4/6/20 11:07


 4/6/20 11:07 DC  





 


 Ringer's Solution  1,000 ml @ 


 30 mls/hr  Q24H  4/6/20 07:00


 4/6/20 18:59 DC  





 


 Sevoflurane


  (Ultane)  60 ml  STK-MED ONCE  4/6/20 12:46


 4/6/20 12:46 DC  





 


 Sodium


 Bicarbonate 50


 meq/Sodium


 Chloride  1,050 ml @ 


 75 mls/hr  Q14H  3/18/20 07:30


 3/23/20 10:28 DC 3/22/20 21:10


75 MLS/HR


 


 Sodium Chloride


  (Normal Saline


 Flush)  20 ml  PRN Q5MIN  PRN  4/14/20 15:00


     





 


 Sodium Chloride


 90 meq/Calcium


 Gluconate 10 meq/


 Multivitamins 10


 ml/Chromium/


 Copper/Manganese/


 Seleni/Zn 0.5 ml/


 Total Parenteral


 Nutrition/Amino


 Acids/Dextrose/


 Fat Emulsion


 Intravenous  1,512 ml @ 


 63 mls/hr  TPN  CONT  3/18/20 22:00


 3/19/20 21:59 DC 3/18/20 22:06


63 MLS/HR


 


 Sodium Chloride


 90 meq/Calcium


 Gluconate 10 meq/


 Multivitamins 10


 ml/Chromium/


 Copper/Manganese/


 Seleni/Zn 1 ml/


 Total Parenteral


 Nutrition/Amino


 Acids/Dextrose/


 Fat Emulsion


 Intravenous  55.005 ml


  @ 2.292


 mls/hr  TPN  CONT  3/18/20 22:00


 3/18/20 12:33 DC  





 


 Sodium Chloride


 90 meq/Magnesium


 Sulfate 10 meq/


 Calcium Gluconate


 20 meq/


 Multivitamins 10


 ml/Chromium/


 Copper/Manganese/


 Seleni/Zn 0.5 ml/


 Total Parenteral


 Nutrition/Amino


 Acids/Dextrose/


 Fat Emulsion


 Intravenous  1,512 ml @ 


 63 mls/hr  TPN  CONT  3/19/20 22:00


 3/20/20 21:59 DC 3/19/20 22:25


63 MLS/HR


 


 Sodium Chloride


 90 meq/Magnesium


 Sulfate 12 meq/


 Calcium Gluconate


 15 meq/


 Multivitamins 10


 ml/Chromium/


 Copper/Manganese/


 Seleni/Zn 0.5 ml/


 Insulin Human


 Regular 25 unit/


 Total Parenteral


 Nutrition/Amino


 Acids/Dextrose/


 Fat Emulsion


 Intravenous  1,400 ml @ 


 58.333 mls/


 hr  TPN  CONT  4/8/20 22:00


 4/9/20 21:59 DC 4/8/20 21:41


58.333 MLS/HR


 


 Sodium Chloride


 90 meq/Potassium


 Chloride 15 meq/


 Magnesium Sulfate


 12 meq/Calcium


 Gluconate 15 meq/


 Multivitamins 10


 ml/Chromium/


 Copper/Manganese/


 Seleni/Zn 0.5 ml/


 Insulin Human


 Regular 25 unit/


 Total Parenteral


 Nutrition/Amino


 Acids/Dextrose/


 Fat Emulsion


 Intravenous  1,400 ml @ 


 58.333 mls/


 hr  TPN  CONT  4/7/20 22:00


 4/8/20 21:59 DC 4/7/20 22:13


58.333 MLS/HR


 


 Sodium Chloride


 90 meq/Potassium


 Chloride 15 meq/


 Potassium


 Phosphate 10 mmol/


 Magnesium Sulfate


 8 meq/Calcium


 Gluconate 15 meq/


 Multivitamins 10


 ml/Chromium/


 Copper/Manganese/


 Seleni/Zn 0.5 ml/


 Insulin Human


 Regular 25 unit/


 Total Parenteral


 Nutrition/Amino


 Acids/Dextrose/


 Fat Emulsion


 Intravenous  1,400 ml @ 


 58.333 mls/


 hr  TPN  CONT  4/5/20 22:00


 4/6/20 21:59 DC 4/5/20 21:20


58.333 MLS/HR


 


 Sodium Chloride


 90 meq/Potassium


 Chloride 15 meq/


 Potassium


 Phosphate 10 mmol/


 Magnesium Sulfate


 10 meq/Calcium


 Gluconate 20 meq/


 Multivitamins 10


 ml/Chromium/


 Copper/Manganese/


 Seleni/Zn 0.5 ml/


 Total Parenteral


 Nutrition/Amino


 Acids/Dextrose/


 Fat Emulsion


 Intravenous  1,400 ml @ 


 58.333 mls/


 hr  TPN  CONT  3/23/20 22:00


 3/24/20 21:59 DC 3/23/20 21:42


58.333 MLS/HR


 


 Sodium Chloride


 90 meq/Potassium


 Chloride 15 meq/


 Potassium


 Phosphate 10 mmol/


 Magnesium Sulfate


 12 meq/Calcium


 Gluconate 15 meq/


 Multivitamins 10


 ml/Chromium/


 Copper/Manganese/


 Seleni/Zn 0.5 ml/


 Insulin Human


 Regular 25 unit/


 Total Parenteral


 Nutrition/Amino


 Acids/Dextrose/


 Fat Emulsion


 Intravenous  1,400 ml @ 


 58.333 mls/


 hr  TPN  CONT  4/6/20 22:00


 4/7/20 21:59 DC 4/6/20 22:24


58.333 MLS/HR


 


 Sodium Chloride


 90 meq/Potassium


 Chloride 15 meq/


 Potassium


 Phosphate 15 mmol/


 Magnesium Sulfate


 10 meq/Calcium


 Gluconate 15 meq/


 Multivitamins 10


 ml/Chromium/


 Copper/Manganese/


 Seleni/Zn 0.5 ml/


 Total Parenteral


 Nutrition/Amino


 Acids/Dextrose/


 Fat Emulsion


 Intravenous  1,400 ml @ 


 58.333 mls/


 hr  TPN  CONT  3/24/20 22:00


 3/25/20 21:59 DC 3/24/20 22:17


58.333 MLS/HR


 


 Sodium Chloride


 90 meq/Potassium


 Chloride 15 meq/


 Potassium


 Phosphate 15 mmol/


 Magnesium Sulfate


 10 meq/Calcium


 Gluconate 20 meq/


 Multivitamins 10


 ml/Chromium/


 Copper/Manganese/


 Seleni/Zn 0.5 ml/


 Total Parenteral


 Nutrition/Amino


 Acids/Dextrose/


 Fat Emulsion


 Intravenous  1,200 ml @ 


 50 mls/hr  TPN  CONT  3/22/20 22:00


 3/22/20 14:17 DC  





 


 Sodium Chloride


 90 meq/Potassium


 Chloride 15 meq/


 Potassium


 Phosphate 18 mmol/


 Magnesium Sulfate


 8 meq/Calcium


 Gluconate 15 meq/


 Multivitamins 10


 ml/Chromium/


 Copper/Manganese/


 Seleni/Zn 0.5 ml/


 Insulin Human


 Regular 10 unit/


 Total Parenteral


 Nutrition/Amino


 Acids/Dextrose/


 Fat Emulsion


 Intravenous  1,400 ml @ 


 58.333 mls/


 hr  TPN  CONT  3/27/20 22:00


 3/28/20 21:59 DC 3/27/20 21:43


58.333 MLS/HR


 


 Sodium Chloride


 90 meq/Potassium


 Chloride 15 meq/


 Potassium


 Phosphate 18 mmol/


 Magnesium Sulfate


 8 meq/Calcium


 Gluconate 15 meq/


 Multivitamins 10


 ml/Chromium/


 Copper/Manganese/


 Seleni/Zn 0.5 ml/


 Insulin Human


 Regular 15 unit/


 Total Parenteral


 Nutrition/Amino


 Acids/Dextrose/


 Fat Emulsion


 Intravenous  1,400 ml @ 


 58.333 mls/


 hr  TPN  CONT  3/30/20 22:00


 3/31/20 21:59 DC 3/30/20 21:47


58.333 MLS/HR


 


 Sodium Chloride


 90 meq/Potassium


 Chloride 15 meq/


 Potassium


 Phosphate 18 mmol/


 Magnesium Sulfate


 8 meq/Calcium


 Gluconate 15 meq/


 Multivitamins 10


 ml/Chromium/


 Copper/Manganese/


 Seleni/Zn 0.5 ml/


 Insulin Human


 Regular 20 unit/


 Total Parenteral


 Nutrition/Amino


 Acids/Dextrose/


 Fat Emulsion


 Intravenous  1,400 ml @ 


 58.333 mls/


 hr  TPN  CONT  4/2/20 22:00


 4/3/20 21:59 DC 4/2/20 22:45


58.333 MLS/HR


 


 Sodium Chloride


 90 meq/Potassium


 Chloride 15 meq/


 Potassium


 Phosphate 18 mmol/


 Magnesium Sulfate


 8 meq/Calcium


 Gluconate 15 meq/


 Multivitamins 10


 ml/Chromium/


 Copper/Manganese/


 Seleni/Zn 0.5 ml/


 Total Parenteral


 Nutrition/Amino


 Acids/Dextrose/


 Fat Emulsion


 Intravenous  1,400 ml @ 


 58.333 mls/


 hr  TPN  CONT  3/26/20 22:00


 3/27/20 21:59 DC 3/26/20 22:00


58.333 MLS/HR


 


 Sodium Chloride


 90 meq/Potassium


 Phosphate 15 mmol/


 Magnesium Sulfate


 12 meq/Calcium


 Gluconate 15 meq/


 Multivitamins 10


 ml/Chromium/


 Copper/Manganese/


 Seleni/Zn 0.5 ml/


 Insulin Human


 Regular 30 unit/


 Total Parenteral


 Nutrition/Amino


 Acids/Dextrose/


 Fat Emulsion


 Intravenous  1,400 ml @ 


 58.333 mls/


 hr  TPN  CONT  4/10/20 22:00


 4/11/20 21:59 DC 4/10/20 21:49


58.333 MLS/HR


 


 Sodium Chloride


 90 meq/Potassium


 Phosphate 15 mmol/


 Magnesium Sulfate


 12 meq/Calcium


 Gluconate 15 meq/


 Multivitamins 10


 ml/Chromium/


 Copper/Manganese/


 Seleni/Zn 0.5 ml/


 Insulin Human


 Regular 40 unit/


 Total Parenteral


 Nutrition/Amino


 Acids/Dextrose/


 Fat Emulsion


 Intravenous  1,400 ml @ 


 58.333 mls/


 hr  TPN  CONT  4/11/20 22:00


 4/12/20 21:59 DC 4/11/20 21:21


58.333 MLS/HR


 


 Sodium Chloride


 90 meq/Potassium


 Phosphate 19 mmol/


 Magnesium Sulfate


 12 meq/Calcium


 Gluconate 15 meq/


 Multivitamins 10


 ml/Chromium/


 Copper/Manganese/


 Seleni/Zn 0.5 ml/


 Insulin Human


 Regular 40 unit/


 Total Parenteral


 Nutrition/Amino


 Acids/Dextrose/


 Fat Emulsion


 Intravenous  1,400 ml @ 


 58.333 mls/


 hr  TPN  CONT  4/12/20 22:00


 4/13/20 21:59 DC 4/12/20 21:54


58.333 MLS/HR


 


 Sodium Chloride


 90 meq/Potassium


 Phosphate 5 mmol/


 Magnesium Sulfate


 12 meq/Calcium


 Gluconate 15 meq/


 Multivitamins 10


 ml/Chromium/


 Copper/Manganese/


 Seleni/Zn 0.5 ml/


 Insulin Human


 Regular 30 unit/


 Total Parenteral


 Nutrition/Amino


 Acids/Dextrose/


 Fat Emulsion


 Intravenous  1,400 ml @ 


 58.333 mls/


 hr  TPN  CONT  4/9/20 22:00


 4/10/20 21:59 DC 4/9/20 22:08


58.333 MLS/HR


 


 Sodium Chloride


 100 meq/Potassium


 Chloride 40 meq/


 Magnesium Sulfate


 15 meq/Calcium


 Gluconate 15 meq/


 Multivitamins 10


 ml/Chromium/


 Copper/Manganese/


 Seleni/Zn 0.5 ml/


 Insulin Human


 Regular 35 unit/


 Total Parenteral


 Nutrition/Amino


 Acids/Dextrose/


 Fat Emulsion


 Intravenous  1,400 ml @ 


 58.333 mls/


 hr  TPN  CONT  4/19/20 22:00


 4/20/20 21:59 DC 4/19/20 22:46


58.333 MLS/HR


 


 Sodium Chloride


 100 meq/Potassium


 Chloride 40 meq/


 Magnesium Sulfate


 20 meq/Calcium


 Gluconate 15 meq/


 Multivitamins 10


 ml/Chromium/


 Copper/Manganese/


 Seleni/Zn 0.5 ml/


 Insulin Human


 Regular 35 unit/


 Total Parenteral


 Nutrition/Amino


 Acids/Dextrose/


 Fat Emulsion


 Intravenous  1,400 ml @ 


 58.333 mls/


 hr  TPN  CONT  4/22/20 22:00


 4/23/20 21:59  4/22/20 22:27


58.333 MLS/HR


 


 Sodium Chloride


 100 meq/Potassium


 Phosphate 10 mmol/


 Magnesium Sulfate


 12 meq/Calcium


 Gluconate 15 meq/


 Multivitamins 10


 ml/Chromium/


 Copper/Manganese/


 Seleni/Zn 0.5 ml/


 Insulin Human


 Regular 35 unit/


 Potassium


 Chloride 20 meq/


 Total Parenteral


 Nutrition/Amino


 Acids/Dextrose/


 Fat Emulsion


 Intravenous  1,400 ml @ 


 58.333 mls/


 hr  TPN  CONT  4/16/20 22:00


 4/17/20 21:59 DC 4/16/20 22:10


58.333 MLS/HR


 


 Sodium Chloride


 100 meq/Potassium


 Phosphate 19 mmol/


 Magnesium Sulfate


 12 meq/Calcium


 Gluconate 15 meq/


 Multivitamins 10


 ml/Chromium/


 Copper/Manganese/


 Seleni/Zn 0.5 ml/


 Insulin Human


 Regular 40 unit/


 Potassium


 Chloride 20 meq/


 Total Parenteral


 Nutrition/Amino


 Acids/Dextrose/


 Fat Emulsion


 Intravenous  1,400 ml @ 


 58.333 mls/


 hr  TPN  CONT  4/15/20 22:00


 4/16/20 21:59 DC 4/15/20 21:20


58.333 MLS/HR


 


 Sodium Chloride


 100 meq/Potassium


 Phosphate 5 mmol/


 Magnesium Sulfate


 12 meq/Calcium


 Gluconate 15 meq/


 Multivitamins 10


 ml/Chromium/


 Copper/Manganese/


 Seleni/Zn 0.5 ml/


 Insulin Human


 Regular 35 unit/


 Potassium


 Chloride 20 meq/


 Total Parenteral


 Nutrition/Amino


 Acids/Dextrose/


 Fat Emulsion


 Intravenous  1,400 ml @ 


 58.333 mls/


 hr  TPN  CONT  4/17/20 22:00


 4/18/20 21:59 DC 4/17/20 22:59


58.333 MLS/HR


 


 Succinylcholine


 Chloride


  (Anectine)  120 mg  1X  ONCE  3/23/20 08:30


 3/23/20 08:31 DC 3/23/20 08:34


120 MG








Lab





Laboratory Tests








Test


 4/22/20


12:19 4/22/20


18:19 4/23/20


00:14 4/23/20


06:20


 


Glucose (Fingerstick)


 146 mg/dL


(70-99) 146 mg/dL


(70-99) 141 mg/dL


(70-99) 





 


Sodium Level


 


 


 


 144 mmol/L


(136-145)


 


Potassium Level


 


 


 


 3.6 mmol/L


(3.5-5.1)


 


Chloride Level


 


 


 


 107 mmol/L


()


 


Carbon Dioxide Level


 


 


 


 26 mmol/L


(21-32)


 


Anion Gap    11 (6-14) 


 


Blood Urea Nitrogen


 


 


 


 78 mg/dL


(7-20)


 


Creatinine


 


 


 


 1.4 mg/dL


(0.6-1.0)


 


Estimated GFR


(Cockcroft-Gault) 


 


 


 40.0 





 


Glucose Level


 


 


 


 146 mg/dL


(70-99)


 


Calcium Level


 


 


 


 9.4 mg/dL


(8.5-10.1)


 


Phosphorus Level


 


 


 


 3.8 mg/dL


(2.6-4.7)


 


Triglycerides Level


 


 


 


 148 mg/dL


(0-150)


 


Test


 4/23/20


06:23 4/23/20


06:35 


 





 


Glucose (Fingerstick)


 134 mg/dL


(70-99) 


 


 





 


White Blood Count


 


 15.6 x10^3/uL


(4.0-11.0) 


 





 


Red Blood Count


 


 3.19 x10^6/uL


(3.50-5.40) 


 





 


Hemoglobin


 


 9.4 g/dL


(12.0-15.5) 


 





 


Hematocrit


 


 28.6 %


(36.0-47.0) 


 





 


Mean Corpuscular Volume  90 fL ()   


 


Mean Corpuscular Hemoglobin  30 pg (25-35)   


 


Mean Corpuscular Hemoglobin


Concent 


 33 g/dL


(31-37) 


 





 


Red Cell Distribution Width


 


 18.4 %


(11.5-14.5) 


 





 


Platelet Count


 


 343 x10^3/uL


(140-400) 


 





 


Neutrophils (%) (Auto)  82 % (31-73)   


 


Lymphocytes (%) (Auto)  10 % (24-48)   


 


Monocytes (%) (Auto)  8 % (0-9)   


 


Eosinophils (%) (Auto)  0 % (0-3)   


 


Basophils (%) (Auto)  0 % (0-3)   


 


Neutrophils # (Auto)


 


 12.8 x10^3/uL


(1.8-7.7) 


 





 


Lymphocytes # (Auto)


 


 1.5 x10^3/uL


(1.0-4.8) 


 





 


Monocytes # (Auto)


 


 1.3 x10^3/uL


(0.0-1.1) 


 





 


Eosinophils # (Auto)


 


 0.0 x10^3/uL


(0.0-0.7) 


 





 


Basophils # (Auto)


 


 0.0 x10^3/uL


(0.0-0.2) 


 











Results


All relevant outside records, renal labs, imaging studies, telemetry/EKG's were 

reviewed.











KIMBERLY MYERS MD                Apr 23, 2020 09:33

## 2020-04-23 NOTE — PDOC
SURGICAL PROGRESS NOTE


Subjective


awake


no acute changes


Vital Signs





Vital Signs








  Date Time  Temp Pulse Resp B/P (MAP) Pulse Ox O2 Delivery O2 Flow Rate FiO2


 


4/23/20 07:26     98 Ventilator  


 


4/23/20 06:00 101.0 144 26 161/89 (113)    





 101.0       








I&O











Intake and Output 


 


 4/23/20





 07:00


 


Intake Total 4517 ml


 


Output Total 2565 ml


 


Balance 1952 ml


 


 


 


Intake Oral 0 ml


 


IV Total 2502 ml


 


Blood Product IV Normal Saline Flush 2015 ml


 


Output Urine Total 2565 ml








PATIENT HAS A VILLASENOR:  Yes


General:  Other (opens eyes on exam)


HEENT:  Other (trach intact)


Abdomen:  Other (distended, TTP on exam)


Labs





Laboratory Tests








Test


 4/21/20


12:48 4/21/20


23:33 4/22/20


05:15 4/22/20


05:30


 


Glucose (Fingerstick)


 157 mg/dL


(70-99) 145 mg/dL


(70-99) 


 





 


Magnesium Level


 


 


 2.1 mg/dL


(1.8-2.4) 





 


White Blood Count


 


 


 


 11.9 x10^3/uL


(4.0-11.0)


 


Red Blood Count


 


 


 


 2.22 x10^6/uL


(3.50-5.40)


 


Hemoglobin


 


 


 


 6.7 g/dL


(12.0-15.5)


 


Hematocrit


 


 


 


 20.6 %


(36.0-47.0)


 


Mean Corpuscular Volume    93 fL () 


 


Mean Corpuscular Hemoglobin    30 pg (25-35) 


 


Mean Corpuscular Hemoglobin


Concent 


 


 


 32 g/dL


(31-37)


 


Red Cell Distribution Width


 


 


 


 18.8 %


(11.5-14.5)


 


Platelet Count


 


 


 


 394 x10^3/uL


(140-400)


 


Neutrophils (%) (Auto)    82 % (31-73) 


 


Lymphocytes (%) (Auto)    10 % (24-48) 


 


Monocytes (%) (Auto)    7 % (0-9) 


 


Eosinophils (%) (Auto)    1 % (0-3) 


 


Basophils (%) (Auto)    0 % (0-3) 


 


Neutrophils # (Auto)


 


 


 


 9.7 x10^3/uL


(1.8-7.7)


 


Lymphocytes # (Auto)


 


 


 


 1.2 x10^3/uL


(1.0-4.8)


 


Monocytes # (Auto)


 


 


 


 0.9 x10^3/uL


(0.0-1.1)


 


Eosinophils # (Auto)


 


 


 


 0.1 x10^3/uL


(0.0-0.7)


 


Basophils # (Auto)


 


 


 


 0.0 x10^3/uL


(0.0-0.2)


 


Sodium Level


 


 


 


 141 mmol/L


(136-145)


 


Potassium Level


 


 


 


 3.5 mmol/L


(3.5-5.1)


 


Chloride Level


 


 


 


 104 mmol/L


()


 


Carbon Dioxide Level


 


 


 


 27 mmol/L


(21-32)


 


Anion Gap    10 (6-14) 


 


Blood Urea Nitrogen


 


 


 


 67 mg/dL


(7-20)


 


Creatinine


 


 


 


 1.4 mg/dL


(0.6-1.0)


 


Estimated GFR


(Cockcroft-Gault) 


 


 


 40.0 





 


Glucose Level


 


 


 


 143 mg/dL


(70-99)


 


Calcium Level


 


 


 


 8.8 mg/dL


(8.5-10.1)


 


Phosphorus Level


 


 


 


 3.1 mg/dL


(2.6-4.7)


 


Albumin


 


 


 


 2.7 g/dL


(3.4-5.0)


 


Test


 4/22/20


05:59 4/22/20


12:19 4/22/20


18:19 4/23/20


00:14


 


Glucose (Fingerstick)


 135 mg/dL


(70-99) 146 mg/dL


(70-99) 146 mg/dL


(70-99) 141 mg/dL


(70-99)


 


Test


 4/23/20


06:20 4/23/20


06:23 4/23/20


06:35 





 


Sodium Level


 144 mmol/L


(136-145) 


 


 





 


Potassium Level


 3.6 mmol/L


(3.5-5.1) 


 


 





 


Chloride Level


 107 mmol/L


() 


 


 





 


Carbon Dioxide Level


 26 mmol/L


(21-32) 


 


 





 


Anion Gap 11 (6-14)    


 


Blood Urea Nitrogen


 78 mg/dL


(7-20) 


 


 





 


Creatinine


 1.4 mg/dL


(0.6-1.0) 


 


 





 


Estimated GFR


(Cockcroft-Gault) 40.0 


 


 


 





 


Glucose Level


 146 mg/dL


(70-99) 


 


 





 


Calcium Level


 9.4 mg/dL


(8.5-10.1) 


 


 





 


Phosphorus Level


 3.8 mg/dL


(2.6-4.7) 


 


 





 


Triglycerides Level


 148 mg/dL


(0-150) 


 


 





 


Glucose (Fingerstick)


 


 134 mg/dL


(70-99) 


 





 


White Blood Count


 


 


 15.6 x10^3/uL


(4.0-11.0) 





 


Red Blood Count


 


 


 3.19 x10^6/uL


(3.50-5.40) 





 


Hemoglobin


 


 


 9.4 g/dL


(12.0-15.5) 





 


Hematocrit


 


 


 28.6 %


(36.0-47.0) 





 


Mean Corpuscular Volume   90 fL ()  


 


Mean Corpuscular Hemoglobin   30 pg (25-35)  


 


Mean Corpuscular Hemoglobin


Concent 


 


 33 g/dL


(31-37) 





 


Red Cell Distribution Width


 


 


 18.4 %


(11.5-14.5) 





 


Platelet Count


 


 


 343 x10^3/uL


(140-400) 





 


Neutrophils (%) (Auto)   82 % (31-73)  


 


Lymphocytes (%) (Auto)   10 % (24-48)  


 


Monocytes (%) (Auto)   8 % (0-9)  


 


Eosinophils (%) (Auto)   0 % (0-3)  


 


Basophils (%) (Auto)   0 % (0-3)  


 


Neutrophils # (Auto)


 


 


 12.8 x10^3/uL


(1.8-7.7) 





 


Lymphocytes # (Auto)


 


 


 1.5 x10^3/uL


(1.0-4.8) 





 


Monocytes # (Auto)


 


 


 1.3 x10^3/uL


(0.0-1.1) 





 


Eosinophils # (Auto)


 


 


 0.0 x10^3/uL


(0.0-0.7) 





 


Basophils # (Auto)


 


 


 0.0 x10^3/uL


(0.0-0.2) 











Laboratory Tests








Test


 4/22/20


12:19 4/22/20


18:19 4/23/20


00:14 4/23/20


06:20


 


Glucose (Fingerstick)


 146 mg/dL


(70-99) 146 mg/dL


(70-99) 141 mg/dL


(70-99) 





 


Sodium Level


 


 


 


 144 mmol/L


(136-145)


 


Potassium Level


 


 


 


 3.6 mmol/L


(3.5-5.1)


 


Chloride Level


 


 


 


 107 mmol/L


()


 


Carbon Dioxide Level


 


 


 


 26 mmol/L


(21-32)


 


Anion Gap    11 (6-14) 


 


Blood Urea Nitrogen


 


 


 


 78 mg/dL


(7-20)


 


Creatinine


 


 


 


 1.4 mg/dL


(0.6-1.0)


 


Estimated GFR


(Cockcroft-Gault) 


 


 


 40.0 





 


Glucose Level


 


 


 


 146 mg/dL


(70-99)


 


Calcium Level


 


 


 


 9.4 mg/dL


(8.5-10.1)


 


Phosphorus Level


 


 


 


 3.8 mg/dL


(2.6-4.7)


 


Triglycerides Level


 


 


 


 148 mg/dL


(0-150)


 


Test


 4/23/20


06:23 4/23/20


06:35 


 





 


Glucose (Fingerstick)


 134 mg/dL


(70-99) 


 


 





 


White Blood Count


 


 15.6 x10^3/uL


(4.0-11.0) 


 





 


Red Blood Count


 


 3.19 x10^6/uL


(3.50-5.40) 


 





 


Hemoglobin


 


 9.4 g/dL


(12.0-15.5) 


 





 


Hematocrit


 


 28.6 %


(36.0-47.0) 


 





 


Mean Corpuscular Volume  90 fL ()   


 


Mean Corpuscular Hemoglobin  30 pg (25-35)   


 


Mean Corpuscular Hemoglobin


Concent 


 33 g/dL


(31-37) 


 





 


Red Cell Distribution Width


 


 18.4 %


(11.5-14.5) 


 





 


Platelet Count


 


 343 x10^3/uL


(140-400) 


 





 


Neutrophils (%) (Auto)  82 % (31-73)   


 


Lymphocytes (%) (Auto)  10 % (24-48)   


 


Monocytes (%) (Auto)  8 % (0-9)   


 


Eosinophils (%) (Auto)  0 % (0-3)   


 


Basophils (%) (Auto)  0 % (0-3)   


 


Neutrophils # (Auto)


 


 12.8 x10^3/uL


(1.8-7.7) 


 





 


Lymphocytes # (Auto)


 


 1.5 x10^3/uL


(1.0-4.8) 


 





 


Monocytes # (Auto)


 


 1.3 x10^3/uL


(0.0-1.1) 


 





 


Eosinophils # (Auto)


 


 0.0 x10^3/uL


(0.0-0.7) 


 





 


Basophils # (Auto)


 


 0.0 x10^3/uL


(0.0-0.2) 


 











Problem List


Problems


Medical Problems:


(1) Acute pancreatitis


Status: Acute  





(2) Cholelithiasis


Status: Acute  








Assessment/Plan


surgery plans next week per SAURAV Vee            Apr 23, 2020 09:02

## 2020-04-24 VITALS — DIASTOLIC BLOOD PRESSURE: 73 MMHG | SYSTOLIC BLOOD PRESSURE: 131 MMHG

## 2020-04-24 VITALS — DIASTOLIC BLOOD PRESSURE: 65 MMHG | SYSTOLIC BLOOD PRESSURE: 100 MMHG

## 2020-04-24 VITALS — DIASTOLIC BLOOD PRESSURE: 75 MMHG | SYSTOLIC BLOOD PRESSURE: 134 MMHG

## 2020-04-24 VITALS — SYSTOLIC BLOOD PRESSURE: 102 MMHG | DIASTOLIC BLOOD PRESSURE: 50 MMHG

## 2020-04-24 VITALS — DIASTOLIC BLOOD PRESSURE: 62 MMHG | SYSTOLIC BLOOD PRESSURE: 99 MMHG

## 2020-04-24 VITALS — DIASTOLIC BLOOD PRESSURE: 55 MMHG | SYSTOLIC BLOOD PRESSURE: 112 MMHG

## 2020-04-24 VITALS — DIASTOLIC BLOOD PRESSURE: 57 MMHG | SYSTOLIC BLOOD PRESSURE: 98 MMHG

## 2020-04-24 VITALS — DIASTOLIC BLOOD PRESSURE: 98 MMHG | SYSTOLIC BLOOD PRESSURE: 142 MMHG

## 2020-04-24 VITALS — SYSTOLIC BLOOD PRESSURE: 111 MMHG | DIASTOLIC BLOOD PRESSURE: 56 MMHG

## 2020-04-24 VITALS — SYSTOLIC BLOOD PRESSURE: 131 MMHG | DIASTOLIC BLOOD PRESSURE: 66 MMHG

## 2020-04-24 VITALS — DIASTOLIC BLOOD PRESSURE: 84 MMHG | SYSTOLIC BLOOD PRESSURE: 184 MMHG

## 2020-04-24 VITALS — SYSTOLIC BLOOD PRESSURE: 139 MMHG | DIASTOLIC BLOOD PRESSURE: 78 MMHG

## 2020-04-24 VITALS — DIASTOLIC BLOOD PRESSURE: 77 MMHG | SYSTOLIC BLOOD PRESSURE: 141 MMHG

## 2020-04-24 VITALS — DIASTOLIC BLOOD PRESSURE: 71 MMHG | SYSTOLIC BLOOD PRESSURE: 124 MMHG

## 2020-04-24 VITALS — SYSTOLIC BLOOD PRESSURE: 113 MMHG | DIASTOLIC BLOOD PRESSURE: 68 MMHG

## 2020-04-24 VITALS — DIASTOLIC BLOOD PRESSURE: 66 MMHG | SYSTOLIC BLOOD PRESSURE: 117 MMHG

## 2020-04-24 VITALS — DIASTOLIC BLOOD PRESSURE: 61 MMHG | SYSTOLIC BLOOD PRESSURE: 122 MMHG

## 2020-04-24 VITALS — DIASTOLIC BLOOD PRESSURE: 142 MMHG | SYSTOLIC BLOOD PRESSURE: 152 MMHG

## 2020-04-24 VITALS — DIASTOLIC BLOOD PRESSURE: 71 MMHG | SYSTOLIC BLOOD PRESSURE: 140 MMHG

## 2020-04-24 VITALS — DIASTOLIC BLOOD PRESSURE: 60 MMHG | SYSTOLIC BLOOD PRESSURE: 122 MMHG

## 2020-04-24 VITALS — DIASTOLIC BLOOD PRESSURE: 54 MMHG | SYSTOLIC BLOOD PRESSURE: 103 MMHG

## 2020-04-24 LAB
ANION GAP SERPL CALC-SCNC: 12 MMOL/L (ref 6–14)
BASOPHILS # BLD AUTO: 0 X10^3/UL (ref 0–0.2)
BASOPHILS NFR BLD: 0 % (ref 0–3)
BUN SERPL-MCNC: 83 MG/DL (ref 7–20)
CALCIUM SERPL-MCNC: 8.9 MG/DL (ref 8.5–10.1)
CHLORIDE SERPL-SCNC: 115 MMOL/L (ref 98–107)
CO2 SERPL-SCNC: 24 MMOL/L (ref 21–32)
CREAT SERPL-MCNC: 1.3 MG/DL (ref 0.6–1)
EOSINOPHIL NFR BLD: 0 % (ref 0–3)
EOSINOPHIL NFR BLD: 0 X10^3/UL (ref 0–0.7)
ERYTHROCYTE [DISTWIDTH] IN BLOOD BY AUTOMATED COUNT: 18.6 % (ref 11.5–14.5)
GFR SERPLBLD BASED ON 1.73 SQ M-ARVRAT: 43.5 ML/MIN
GLUCOSE SERPL-MCNC: 142 MG/DL (ref 70–99)
HCT VFR BLD CALC: 25 % (ref 36–47)
HGB BLD-MCNC: 8.2 G/DL (ref 12–15.5)
LYMPHOCYTES # BLD: 0.9 X10^3/UL (ref 1–4.8)
LYMPHOCYTES NFR BLD AUTO: 8 % (ref 24–48)
MAGNESIUM SERPL-MCNC: 2.1 MG/DL (ref 1.8–2.4)
MCH RBC QN AUTO: 30 PG (ref 25–35)
MCHC RBC AUTO-ENTMCNC: 33 G/DL (ref 31–37)
MCV RBC AUTO: 91 FL (ref 79–100)
MONO #: 0.9 X10^3/UL (ref 0–1.1)
MONOCYTES NFR BLD: 8 % (ref 0–9)
NEUT #: 9.5 X10^3/UL (ref 1.8–7.7)
NEUTROPHILS NFR BLD AUTO: 83 % (ref 31–73)
PHOSPHATE SERPL-MCNC: 3.9 MG/DL (ref 2.6–4.7)
PLATELET # BLD AUTO: 287 X10^3/UL (ref 140–400)
POTASSIUM SERPL-SCNC: 3.4 MMOL/L (ref 3.5–5.1)
RBC # BLD AUTO: 2.75 X10^6/UL (ref 3.5–5.4)
SODIUM SERPL-SCNC: 151 MMOL/L (ref 136–145)
WBC # BLD AUTO: 11.5 X10^3/UL (ref 4–11)

## 2020-04-24 RX ADMIN — DEXMEDETOMIDINE HYDROCHLORIDE PRN MLS/HR: 100 INJECTION, SOLUTION, CONCENTRATE INTRAVENOUS at 00:04

## 2020-04-24 RX ADMIN — DEXMEDETOMIDINE HYDROCHLORIDE PRN MLS/HR: 100 INJECTION, SOLUTION, CONCENTRATE INTRAVENOUS at 02:18

## 2020-04-24 RX ADMIN — DEXMEDETOMIDINE HYDROCHLORIDE PRN MLS/HR: 100 INJECTION, SOLUTION, CONCENTRATE INTRAVENOUS at 12:05

## 2020-04-24 RX ADMIN — DAPTOMYCIN SCH MLS/HR: 500 INJECTION, POWDER, LYOPHILIZED, FOR SOLUTION INTRAVENOUS at 13:46

## 2020-04-24 RX ADMIN — FENTANYL CITRATE PRN MCG: 50 INJECTION INTRAMUSCULAR; INTRAVENOUS at 03:20

## 2020-04-24 RX ADMIN — INSULIN LISPRO SCH UNITS: 100 INJECTION, SOLUTION INTRAVENOUS; SUBCUTANEOUS at 00:00

## 2020-04-24 RX ADMIN — MEROPENEM SCH MLS/HR: 500 INJECTION, POWDER, FOR SOLUTION INTRAVENOUS at 08:43

## 2020-04-24 RX ADMIN — DEXTROSE SCH MLS/HR: 50 INJECTION, SOLUTION INTRAVENOUS at 08:42

## 2020-04-24 RX ADMIN — DAPTOMYCIN SCH MLS/HR: 500 INJECTION, POWDER, LYOPHILIZED, FOR SOLUTION INTRAVENOUS at 15:07

## 2020-04-24 RX ADMIN — DEXMEDETOMIDINE HYDROCHLORIDE PRN MLS/HR: 100 INJECTION, SOLUTION, CONCENTRATE INTRAVENOUS at 05:49

## 2020-04-24 RX ADMIN — DEXMEDETOMIDINE HYDROCHLORIDE PRN MLS/HR: 100 INJECTION, SOLUTION, CONCENTRATE INTRAVENOUS at 09:42

## 2020-04-24 RX ADMIN — Medication PRN EACH: at 12:16

## 2020-04-24 RX ADMIN — PANTOPRAZOLE SODIUM SCH MG: 40 INJECTION, POWDER, FOR SOLUTION INTRAVENOUS at 08:42

## 2020-04-24 RX ADMIN — MEROPENEM SCH MLS/HR: 500 INJECTION, POWDER, FOR SOLUTION INTRAVENOUS at 22:45

## 2020-04-24 RX ADMIN — HEPARIN SODIUM SCH UNIT: 5000 INJECTION, SOLUTION INTRAVENOUS; SUBCUTANEOUS at 22:37

## 2020-04-24 RX ADMIN — FENTANYL CITRATE PRN MCG: 50 INJECTION INTRAMUSCULAR; INTRAVENOUS at 13:35

## 2020-04-24 RX ADMIN — INSULIN LISPRO SCH UNITS: 100 INJECTION, SOLUTION INTRAVENOUS; SUBCUTANEOUS at 13:19

## 2020-04-24 RX ADMIN — DEXMEDETOMIDINE HYDROCHLORIDE PRN MLS/HR: 100 INJECTION, SOLUTION, CONCENTRATE INTRAVENOUS at 16:30

## 2020-04-24 RX ADMIN — DEXMEDETOMIDINE HYDROCHLORIDE PRN MLS/HR: 100 INJECTION, SOLUTION, CONCENTRATE INTRAVENOUS at 22:45

## 2020-04-24 RX ADMIN — DEXMEDETOMIDINE HYDROCHLORIDE PRN MLS/HR: 100 INJECTION, SOLUTION, CONCENTRATE INTRAVENOUS at 19:57

## 2020-04-24 RX ADMIN — ONDANSETRON PRN MG: 2 INJECTION INTRAMUSCULAR; INTRAVENOUS at 11:01

## 2020-04-24 RX ADMIN — PROCHLORPERAZINE EDISYLATE PRN MG: 5 INJECTION INTRAMUSCULAR; INTRAVENOUS at 08:42

## 2020-04-24 RX ADMIN — INSULIN LISPRO SCH UNITS: 100 INJECTION, SOLUTION INTRAVENOUS; SUBCUTANEOUS at 06:25

## 2020-04-24 RX ADMIN — HEPARIN SODIUM SCH UNIT: 5000 INJECTION, SOLUTION INTRAVENOUS; SUBCUTANEOUS at 08:46

## 2020-04-24 RX ADMIN — ONDANSETRON PRN MG: 2 INJECTION INTRAMUSCULAR; INTRAVENOUS at 05:28

## 2020-04-24 NOTE — PDOC
SUBJECTIVE


ROS


stable , sitting up , PT with pt





OBJECTIVE


Vital Signs





Vital Signs








  Date Time  Temp Pulse Resp B/P (MAP) Pulse Ox O2 Delivery O2 Flow Rate FiO2


 


4/24/20 07:28     100 Ventilator  


 


4/24/20 06:00 98.0 106  141/77 (98)    





 98.0       


 


4/24/20 03:20   23    6.0 








I & 0











Intake and Output 


 


 4/24/20





 07:00


 


Intake Total 2067 ml


 


Output Total 2950 ml


 


Balance -883 ml


 


 


 


Intake Oral 0 ml


 


IV Total 1163 ml


 


Blood Product IV Normal Saline Flush 904 ml


 


Output Urine Total 2825 ml


 


Gastric Drainage Total 125 ml











PHYSICAL EXAM


Physical Exam





GENERAL: awake,  alert 


HEENT: + NGT, 


NECK:  Trach/vent 


LUNGS: rhonchi 


HEART:  S1, S2, regular 


ABDOMEN: Distended, tender, hypoactive BS, 


: [place] (4/14)


EXTREMITIES: Generalized edema, no cyanosis, 


DERMATOLOGIC:   No generalized rash.  


CENTRAL NERVOUS SYSTEM: Nods appropriately to few questions, 


HDC  (4/14)





DIAGNOSIS/ASSESSMENT


Assessment & Plan





ARF-- ATN,requiring CRRT and HD 


Switched from QD HD to TTS schedule  - last HD on 4/22 


Great UOP  , Cr and e-lytes stable , currently on TPN ,BUN  increased, Hold RRT 


 ,Re-eval in am,  avoid nephrotoxins  


Access- Temp HDC 


 


Anemia-  Currently on YOLI 


Hgb dropped, s/p PRBC ,managed by primary/GI  





Hypernatremia -Adjust Na in TPN, 





Recurrent fever  





Anasarca due to 3rd spacing  stable  





Hyperkalemia- resolved  





AC Pancreatitis, early developing necrosis


CT SCAN 4/22  -   Pancreas again appears diffusely enlarged and hypodense 

compatible with diffuse pancreatitis and pancreatic necrosis.


  Extensive retroperitoneal fluid collections  slightly larger in size,Moderate 

to large volume of abdominal and pelvic ascites is again  identified. Mild 

generalized increase in fatty inflammation or anasarca.


Plan for surgery on Monday  





 Cholelithiasis with possible cholecystitis.





Moderate pleural effusions, may be slightly improved. Infiltrate/atelectasis in 

both lung bases.


  


 Ascites - post paracentesis on 4/15-  4300 cc of thin, light brown fluid was 

removed





Acute hypoxic resp failure ,bilateral pleural effusion


  Trach in place , On Vent  


 


HTN 





COVID-19 neg, 3/26





COMMENT/RELEVANT DATA


Meds





Current Medications








 Medications


  (Trade)  Dose


 Ordered  Sig/Yee  Start Time


 Stop Time Status Last Admin


Dose Admin


 


 Acetaminophen


  (Tylenol Supp)  650 mg  PRN Q6HRS  PRN  3/24/20 10:30


    4/23/20 12:18


650 MG


 


 Acetaminophen


  (Tylenol)  650 mg  PRN Q6HRS  PRN  3/21/20 03:36


    4/16/20 19:56


650 MG


 


 Albumin Human  200 ml @ 


 200 mls/hr  1X PRN  PRN  4/21/20 08:00


 4/21/20 13:59 DC 4/21/20 08:40


200 MLS/HR


 


 Albuterol Sulfate


  (Ventolin Neb


 Soln)  2.5 mg  1X  ONCE  3/17/20 22:30


 3/17/20 22:31 DC 3/18/20 00:56


2.5 MG


 


 Alteplase,


 Recombinant


  (Cathflo For


 Central Catheter


 Clearance)  1 mg  1X  ONCE  3/31/20 22:00


 3/31/20 22:01 DC 3/31/20 22:05


1 MG


 


 Amino Acids/


 Glycerin/


 Electrolytes  1,000 ml @ 


 75 mls/hr  [id number]  4/20/20 21:15


   UNV  





 


 Artificial Tears


  (Artificial


 Tears)  1 drop  PRN Q1HR  PRN  3/23/20 08:15


     





 


 Atenolol


  (Tenormin)  100 mg  DAILY  3/17/20 09:00


 3/16/20 20:08 DC  





 


 Atropine Sulfate


  (ATROPINE 0.5mg


 SYRINGE)  0.5 mg  PRN Q5MIN  PRN  4/2/20 08:15


     





 


 Benzocaine


  (Hurricaine One)  1 spray  1X  ONCE  3/20/20 14:30


 3/20/20 14:31 DC 3/20/20 16:38


1 SPRAY


 


 Bupivacaine HCl/


 Epinephrine Bitart


  (Sensorcain-Epi


 0.5%-1:801659 Mpf)  30 ml  STK-MED ONCE  4/6/20 11:00


 4/6/20 11:01 DC 4/6/20 11:44


1 ML


 


 Calcium Carbonate/


 Glycine


  (Tums)  500 mg  PRN [id number]  PRN  3/18/20 17:45


     





 


 Calcium Chloride


 1000 mg/Sodium


 Chloride  110 ml @ 


 220 mls/hr  1X  ONCE  3/17/20 22:30


 3/17/20 22:59 DC 3/17/20 22:11


220 MLS/HR


 


 Calcium Chloride


 3000 mg/Sodium


 Chloride  1,030 ml @ 


 50 mls/hr  [id number]  3/19/20 08:00


 3/21/20 15:23 DC 3/21/20 02:17


50 MLS/HR


 


 Calcium Gluconate


  (Calcium


 Gluconate)  2,000 mg  1X  ONCE  3/19/20 02:15


 3/19/20 02:16 DC 3/19/20 02:19


2,000 MG


 


 Calcium Gluconate


 1000 mg/Sodium


 Chloride  110 ml @ 


 220 mls/hr  1X  ONCE  3/18/20 03:30


 3/18/20 03:59 DC 3/18/20 03:21


220 MLS/HR


 


 Calcium Gluconate


 2000 mg/Sodium


 Chloride  120 ml @ 


 220 mls/hr  1X  ONCE  3/18/20 07:30


 3/18/20 08:02 DC 3/18/20 09:05


220 MLS/HR


 


 Cefepime HCl


  (Maxipime)  2 gm  Q12HR  3/25/20 09:00


 4/8/20 09:58 DC 4/7/20 20:56


2 GM


 


 Cellulose


  (Surgicel


 Fibrillar 1x2)  1 each  STK-MED ONCE  4/6/20 11:00


 4/6/20 11:01 DC  





 


 Cellulose


  (Surgicel


 Hemostat 4x8)  1 each  STK-MED ONCE  4/6/20 11:55


 4/6/20 11:56 DC 4/6/20 11:44


1 EACH


 


 Daptomycin 430 mg/


 Sodium Chloride  50 ml @ 


 100 mls/hr  Q24H  4/22/20 13:00


    4/23/20 12:25


100 MLS/HR


 


 Daptomycin 500 mg/


 Sodium Chloride  50 ml @ 


 100 mls/hr  Q48H  3/25/20 08:30


 4/10/20 10:07 DC 4/10/20 09:57


100 MLS/HR


 


 Dexmedetomidine


 HCl 400 mcg/


 Sodium Chloride  100 ml @ 0


 mls/hr  CONT  PRN  4/2/20 08:15


    4/24/20 05:49


38 MLS/HR


 


 Dextrose


  (Dextrose


 50%-Water Syringe)  12.5 gm  PRN Q15MIN  PRN  3/16/20 09:30


     





 


 Digoxin


  (Lanoxin)  125 mcg  1X  ONCE  3/19/20 18:00


 3/19/20 18:01 DC 3/19/20 17:10


125 MCG


 


 Diphenhydramine


 HCl


  (Benadryl)  25 mg  1X PRN  PRN  4/13/20 09:45


 4/14/20 09:44 DC  





 


 Enoxaparin Sodium


  (Lovenox 100mg


 Syringe)  100 mg  Q12HR  4/21/20 21:00


   UNV  





 


 Etomidate


  (Amidate)  8 mg  1X  ONCE  3/23/20 08:30


 3/23/20 08:31 DC 3/23/20 08:33


8 MG


 


 Fentanyl Citrate


  (Fentanyl 2ml


 Vial)  25 mcg  PRN Q2HR  PRN  4/20/20 21:00


     





 


 Furosemide


  (Lasix)  40 mg  1X  ONCE  4/13/20 14:30


 4/13/20 14:31 DC 4/13/20 14:39


40 MG


 


 Heparin Sodium


  (Porcine)


  (Hep Lock Adult)  500 unit  STK-MED ONCE  4/7/20 09:29


 4/7/20 09:30 DC  





 


 Heparin Sodium


  (Porcine)


  (Heparin Sodium)  5,000 unit  Q12HR  4/3/20 21:00


    4/24/20 08:46


5,000 UNIT


 


 Hydromorphone HCl


  (Dilaudid)  1 mg  PRN Q3HRS  PRN  3/17/20 12:00


 3/31/20 00:25 DC 3/23/20 05:13


1 MG


 


 Info


  (CONTRAST GIVEN


 -- Rx MONITORING)  1 each  PRN DAILY  PRN  3/30/20 11:45


 4/1/20 11:44 DC  





 


 Info


  (Icu Electrolyte


 Protocol)  1 ea  CONT PRN  PRN  3/29/20 13:15


     





 


 Info


  (PHARMACY


 MONITORING -- do


 not chart)  1 each  PRN DAILY  PRN  4/21/20 08:00


   UNV  





 


 Info


  (Tpn Per


 Pharmacy)  1 each  PRN DAILY  PRN  3/18/20 12:30


   UNV  





 


 Insulin Human


 Lispro


  (HumaLOG)  0-9 UNITS  Q6HRS  3/16/20 09:30


    4/21/20 00:06


4 UNITS


 


 Insulin Human


 Regular


  (HumuLIN R VIAL)  5 unit  1X  ONCE  3/17/20 22:30


 3/17/20 22:31 DC 3/17/20 22:14


5 UNIT


 


 Iohexol


  (Omnipaque 240


 Mg/ml)  30 ml  1X  ONCE  3/30/20 11:30


 3/30/20 11:33 DC 3/30/20 11:30


30 ML


 


 Iohexol


  (Omnipaque 300


 Mg/ml)  60 ml  1X  ONCE  3/17/20 17:00


 3/17/20 17:01 DC 3/17/20 17:20


60 ML


 


 Iohexol


  (Omnipaque 350


 Mg/ml)  90 ml  1X  ONCE  3/16/20 03:30


 3/16/20 03:31 DC 3/16/20 03:25


90 ML


 


 Ketorolac


 Tromethamine


  (Toradol 30mg


 Vial)  30 mg  1X  ONCE  3/16/20 03:00


 3/16/20 03:01 DC 3/16/20 02:54


30 MG


 


 Lidocaine HCl


  (Buffered


 Lidocaine 1%)  6 ml  1X  ONCE  4/15/20 13:30


 4/15/20 13:31 DC 4/15/20 13:45


9 ML


 


 Lidocaine HCl


  (Glydo


  (Lidocaine) Jelly)  1 thomas  1X  ONCE  3/20/20 14:30


 3/20/20 14:31 DC 3/20/20 16:38


1 THOMAS


 


 Lidocaine HCl


  (Xylocaine-Mpf


 1% 2ml Vial)  2 ml  PRN 1X  PRN  4/6/20 07:00


 4/7/20 06:59 DC  





 


 Linezolid/Dextrose  300 ml @ 


 300 mls/hr  Q12HR  4/10/20 11:00


 4/21/20 08:10 DC 4/20/20 20:40


300 MLS/HR


 


 Lorazepam


  (Ativan Inj)  1 mg  PRN Q4HRS  PRN  3/19/20 09:00


 4/17/20 09:19 DC 4/17/20 03:51


1 MG


 


 Magnesium Sulfate  50 ml @ 25


 mls/hr  PRN DAILY  PRN  4/5/20 09:15


    4/20/20 17:27


25 MLS/HR


 


 Meropenem 1 gm/


 Sodium Chloride  100 ml @ 


 200 mls/hr  Q8HRS  3/17/20 20:00


 3/18/20 08:48 DC 3/18/20 05:45


200 MLS/HR


 


 Meropenem 500 mg/


 Sodium Chloride  50 ml @ 


 100 mls/hr  Q12H  4/8/20 10:00


    4/24/20 08:43


100 MLS/HR


 


 Metoprolol


 Tartrate


  (Lopressor Vial)  5 mg  Q6HRS  3/17/20 10:15


 3/28/20 08:48 DC 3/26/20 00:12


5 MG


 


 Metronidazole  100 ml @ 


 100 mls/hr  Q8HRS  4/14/20 10:00


 4/21/20 08:10 DC 4/21/20 06:04


100 MLS/HR


 


 Micafungin Sodium


 100 mg/Dextrose  100 ml @ 


 100 mls/hr  Q24H  3/23/20 09:00


    4/24/20 08:42


100 MLS/HR


 


 Midazolam HCl


  (Versed)  5 mg  1X  ONCE  3/23/20 08:30


 3/23/20 08:31 DC  





 


 Midazolam HCl 100


 mg/Sodium Chloride  100 ml @ 7


 mls/hr  CONT  PRN  3/28/20 16:00


    4/8/20 15:35


7 MLS/HR


 


 Midazolam HCl 50


 mg/Sodium Chloride  50 ml @ 0


 mls/hr  CONT  PRN  3/23/20 08:15


 3/28/20 15:59 DC 3/26/20 22:39


7 MLS/HR


 


 Morphine Sulfate


  (Morphine


 Sulfate)  2 mg  PRN Q2HR  PRN  3/16/20 05:00


 3/17/20 14:15 DC 3/17/20 12:26


2 MG


 


 Multi-Ingred


 Cream/Lotion/Oil/


 Oint


  (Artificial


 Tears Eye


 Ointment)  1 thomas  PRN Q1HR  PRN  3/25/20 17:30


    4/13/20 08:19


1 THOMAS


 


 Norepinephrine


 Bitartrate 8 mg/


 Dextrose  258 ml @ 


 17.299 mls/


 hr  CONT  PRN  3/17/20 15:30


 4/17/20 09:19 DC 4/14/20 12:48


20.9 MLS/HR


 


 Ondansetron HCl


  (Zofran)  4 mg  PRN Q6HRS  PRN  4/6/20 07:00


 4/7/20 06:59 DC  





 


 Pantoprazole


 Sodium


  (PROTONIX VIAL


 for IV PUSH)  40 mg  DAILYAC  3/16/20 11:30


    4/24/20 08:42


40 MG


 


 Piperacillin Sod/


 Tazobactam Sod


 4.5 gm/Sodium


 Chloride  100 ml @ 


 200 mls/hr  1X  ONCE  3/16/20 06:00


 3/16/20 06:29 DC 3/16/20 05:44


200 MLS/HR


 


 Potassium


 Chloride 15 meq/


 Bicarbonate


 Dialysis Soln w/


 out KCl  5,007.5 ml


  @ 1,000 mls/


 hr  Q5H1M  3/29/20 20:00


 4/2/20 13:08 DC 4/1/20 18:14


1,000 MLS/HR


 


 Potassium


 Chloride 20 meq/


 Bicarbonate


 Dialysis Soln w/


 out KCl  5,010 ml @ 


 1,000 mls/hr  Q5H1M  3/25/20 16:00


 3/29/20 19:59 DC 3/29/20 14:54


1,000 MLS/HR


 


 Potassium


 Chloride/Water  100 ml @ 


 100 mls/hr  1X  ONCE  4/18/20 14:45


 4/18/20 15:44 DC 4/18/20 17:28


100 MLS/HR


 


 Potassium


 Phosphate 20 mmol/


 Sodium Chloride  106.6667


 ml @ 


 51.667 m...  1X  ONCE  3/25/20 13:00


 3/25/20 15:03 DC 3/25/20 12:51


51.667 MLS/HR


 


 Prochlorperazine


 Edisylate


  (Compazine)  5 mg  PACU PRN  PRN  4/6/20 07:00


 4/7/20 06:59 DC  





 


 Propofol  20 ml @ As


 Directed  STK-MED ONCE  4/6/20 11:07


 4/6/20 11:07 DC  





 


 Ringer's Solution  1,000 ml @ 


 30 mls/hr  Q24H  4/6/20 07:00


 4/6/20 18:59 DC  





 


 Sevoflurane


  (Ultane)  60 ml  STK-MED ONCE  4/6/20 12:46


 4/6/20 12:46 DC  





 


 Sodium


 Bicarbonate 50


 meq/Sodium


 Chloride  1,050 ml @ 


 75 mls/hr  Q14H  3/18/20 07:30


 3/23/20 10:28 DC 3/22/20 21:10


75 MLS/HR


 


 Sodium Chloride


  (Normal Saline


 Flush)  20 ml  PRN Q5MIN  PRN  4/14/20 15:00


     





 


 Sodium Chloride


 90 meq/Calcium


 Gluconate 10 meq/


 Multivitamins 10


 ml/Chromium/


 Copper/Manganese/


 Seleni/Zn 0.5 ml/


 Total Parenteral


 Nutrition/Amino


 Acids/Dextrose/


 Fat Emulsion


 Intravenous  1,512 ml @ 


 63 mls/hr  TPN  CONT  3/18/20 22:00


 3/19/20 21:59 DC 3/18/20 22:06


63 MLS/HR


 


 Sodium Chloride


 90 meq/Calcium


 Gluconate 10 meq/


 Multivitamins 10


 ml/Chromium/


 Copper/Manganese/


 Seleni/Zn 1 ml/


 Total Parenteral


 Nutrition/Amino


 Acids/Dextrose/


 Fat Emulsion


 Intravenous  55.005 ml


  @ 2.292


 mls/hr  TPN  CONT  3/18/20 22:00


 3/18/20 12:33 DC  





 


 Sodium Chloride


 90 meq/Magnesium


 Sulfate 10 meq/


 Calcium Gluconate


 20 meq/


 Multivitamins 10


 ml/Chromium/


 Copper/Manganese/


 Seleni/Zn 0.5 ml/


 Total Parenteral


 Nutrition/Amino


 Acids/Dextrose/


 Fat Emulsion


 Intravenous  1,512 ml @ 


 63 mls/hr  TPN  CONT  3/19/20 22:00


 3/20/20 21:59 DC 3/19/20 22:25


63 MLS/HR


 


 Sodium Chloride


 90 meq/Magnesium


 Sulfate 12 meq/


 Calcium Gluconate


 15 meq/


 Multivitamins 10


 ml/Chromium/


 Copper/Manganese/


 Seleni/Zn 0.5 ml/


 Insulin Human


 Regular 25 unit/


 Total Parenteral


 Nutrition/Amino


 Acids/Dextrose/


 Fat Emulsion


 Intravenous  1,400 ml @ 


 58.333 mls/


 hr  TPN  CONT  4/8/20 22:00


 4/9/20 21:59 DC 4/8/20 21:41


58.333 MLS/HR


 


 Sodium Chloride


 90 meq/Potassium


 Chloride 15 meq/


 Magnesium Sulfate


 12 meq/Calcium


 Gluconate 15 meq/


 Multivitamins 10


 ml/Chromium/


 Copper/Manganese/


 Seleni/Zn 0.5 ml/


 Insulin Human


 Regular 25 unit/


 Total Parenteral


 Nutrition/Amino


 Acids/Dextrose/


 Fat Emulsion


 Intravenous  1,400 ml @ 


 58.333 mls/


 hr  TPN  CONT  4/7/20 22:00


 4/8/20 21:59 DC 4/7/20 22:13


58.333 MLS/HR


 


 Sodium Chloride


 90 meq/Potassium


 Chloride 15 meq/


 Potassium


 Phosphate 10 mmol/


 Magnesium Sulfate


 8 meq/Calcium


 Gluconate 15 meq/


 Multivitamins 10


 ml/Chromium/


 Copper/Manganese/


 Seleni/Zn 0.5 ml/


 Insulin Human


 Regular 25 unit/


 Total Parenteral


 Nutrition/Amino


 Acids/Dextrose/


 Fat Emulsion


 Intravenous  1,400 ml @ 


 58.333 mls/


 hr  TPN  CONT  4/5/20 22:00


 4/6/20 21:59 DC 4/5/20 21:20


58.333 MLS/HR


 


 Sodium Chloride


 90 meq/Potassium


 Chloride 15 meq/


 Potassium


 Phosphate 10 mmol/


 Magnesium Sulfate


 10 meq/Calcium


 Gluconate 20 meq/


 Multivitamins 10


 ml/Chromium/


 Copper/Manganese/


 Seleni/Zn 0.5 ml/


 Total Parenteral


 Nutrition/Amino


 Acids/Dextrose/


 Fat Emulsion


 Intravenous  1,400 ml @ 


 58.333 mls/


 hr  TPN  CONT  3/23/20 22:00


 3/24/20 21:59 DC 3/23/20 21:42


58.333 MLS/HR


 


 Sodium Chloride


 90 meq/Potassium


 Chloride 15 meq/


 Potassium


 Phosphate 10 mmol/


 Magnesium Sulfate


 12 meq/Calcium


 Gluconate 15 meq/


 Multivitamins 10


 ml/Chromium/


 Copper/Manganese/


 Seleni/Zn 0.5 ml/


 Insulin Human


 Regular 25 unit/


 Total Parenteral


 Nutrition/Amino


 Acids/Dextrose/


 Fat Emulsion


 Intravenous  1,400 ml @ 


 58.333 mls/


 hr  TPN  CONT  4/6/20 22:00


 4/7/20 21:59 DC 4/6/20 22:24


58.333 MLS/HR


 


 Sodium Chloride


 90 meq/Potassium


 Chloride 15 meq/


 Potassium


 Phosphate 15 mmol/


 Magnesium Sulfate


 10 meq/Calcium


 Gluconate 15 meq/


 Multivitamins 10


 ml/Chromium/


 Copper/Manganese/


 Seleni/Zn 0.5 ml/


 Total Parenteral


 Nutrition/Amino


 Acids/Dextrose/


 Fat Emulsion


 Intravenous  1,400 ml @ 


 58.333 mls/


 hr  TPN  CONT  3/24/20 22:00


 3/25/20 21:59 DC 3/24/20 22:17


58.333 MLS/HR


 


 Sodium Chloride


 90 meq/Potassium


 Chloride 15 meq/


 Potassium


 Phosphate 15 mmol/


 Magnesium Sulfate


 10 meq/Calcium


 Gluconate 20 meq/


 Multivitamins 10


 ml/Chromium/


 Copper/Manganese/


 Seleni/Zn 0.5 ml/


 Total Parenteral


 Nutrition/Amino


 Acids/Dextrose/


 Fat Emulsion


 Intravenous  1,200 ml @ 


 50 mls/hr  TPN  CONT  3/22/20 22:00


 3/22/20 14:17 DC  





 


 Sodium Chloride


 90 meq/Potassium


 Chloride 15 meq/


 Potassium


 Phosphate 18 mmol/


 Magnesium Sulfate


 8 meq/Calcium


 Gluconate 15 meq/


 Multivitamins 10


 ml/Chromium/


 Copper/Manganese/


 Seleni/Zn 0.5 ml/


 Insulin Human


 Regular 10 unit/


 Total Parenteral


 Nutrition/Amino


 Acids/Dextrose/


 Fat Emulsion


 Intravenous  1,400 ml @ 


 58.333 mls/


 hr  TPN  CONT  3/27/20 22:00


 3/28/20 21:59 DC 3/27/20 21:43


58.333 MLS/HR


 


 Sodium Chloride


 90 meq/Potassium


 Chloride 15 meq/


 Potassium


 Phosphate 18 mmol/


 Magnesium Sulfate


 8 meq/Calcium


 Gluconate 15 meq/


 Multivitamins 10


 ml/Chromium/


 Copper/Manganese/


 Seleni/Zn 0.5 ml/


 Insulin Human


 Regular 15 unit/


 Total Parenteral


 Nutrition/Amino


 Acids/Dextrose/


 Fat Emulsion


 Intravenous  1,400 ml @ 


 58.333 mls/


 hr  TPN  CONT  3/30/20 22:00


 3/31/20 21:59 DC 3/30/20 21:47


58.333 MLS/HR


 


 Sodium Chloride


 90 meq/Potassium


 Chloride 15 meq/


 Potassium


 Phosphate 18 mmol/


 Magnesium Sulfate


 8 meq/Calcium


 Gluconate 15 meq/


 Multivitamins 10


 ml/Chromium/


 Copper/Manganese/


 Seleni/Zn 0.5 ml/


 Insulin Human


 Regular 20 unit/


 Total Parenteral


 Nutrition/Amino


 Acids/Dextrose/


 Fat Emulsion


 Intravenous  1,400 ml @ 


 58.333 mls/


 hr  TPN  CONT  4/2/20 22:00


 4/3/20 21:59 DC 4/2/20 22:45


58.333 MLS/HR


 


 Sodium Chloride


 90 meq/Potassium


 Chloride 15 meq/


 Potassium


 Phosphate 18 mmol/


 Magnesium Sulfate


 8 meq/Calcium


 Gluconate 15 meq/


 Multivitamins 10


 ml/Chromium/


 Copper/Manganese/


 Seleni/Zn 0.5 ml/


 Total Parenteral


 Nutrition/Amino


 Acids/Dextrose/


 Fat Emulsion


 Intravenous  1,400 ml @ 


 58.333 mls/


 hr  TPN  CONT  3/26/20 22:00


 3/27/20 21:59 DC 3/26/20 22:00


58.333 MLS/HR


 


 Sodium Chloride


 90 meq/Potassium


 Phosphate 15 mmol/


 Magnesium Sulfate


 12 meq/Calcium


 Gluconate 15 meq/


 Multivitamins 10


 ml/Chromium/


 Copper/Manganese/


 Seleni/Zn 0.5 ml/


 Insulin Human


 Regular 30 unit/


 Total Parenteral


 Nutrition/Amino


 Acids/Dextrose/


 Fat Emulsion


 Intravenous  1,400 ml @ 


 58.333 mls/


 hr  TPN  CONT  4/10/20 22:00


 4/11/20 21:59 DC 4/10/20 21:49


58.333 MLS/HR


 


 Sodium Chloride


 90 meq/Potassium


 Phosphate 15 mmol/


 Magnesium Sulfate


 12 meq/Calcium


 Gluconate 15 meq/


 Multivitamins 10


 ml/Chromium/


 Copper/Manganese/


 Seleni/Zn 0.5 ml/


 Insulin Human


 Regular 40 unit/


 Total Parenteral


 Nutrition/Amino


 Acids/Dextrose/


 Fat Emulsion


 Intravenous  1,400 ml @ 


 58.333 mls/


 hr  TPN  CONT  4/11/20 22:00


 4/12/20 21:59 DC 4/11/20 21:21


58.333 MLS/HR


 


 Sodium Chloride


 90 meq/Potassium


 Phosphate 19 mmol/


 Magnesium Sulfate


 12 meq/Calcium


 Gluconate 15 meq/


 Multivitamins 10


 ml/Chromium/


 Copper/Manganese/


 Seleni/Zn 0.5 ml/


 Insulin Human


 Regular 40 unit/


 Total Parenteral


 Nutrition/Amino


 Acids/Dextrose/


 Fat Emulsion


 Intravenous  1,400 ml @ 


 58.333 mls/


 hr  TPN  CONT  4/12/20 22:00


 4/13/20 21:59 DC 4/12/20 21:54


58.333 MLS/HR


 


 Sodium Chloride


 90 meq/Potassium


 Phosphate 5 mmol/


 Magnesium Sulfate


 12 meq/Calcium


 Gluconate 15 meq/


 Multivitamins 10


 ml/Chromium/


 Copper/Manganese/


 Seleni/Zn 0.5 ml/


 Insulin Human


 Regular 30 unit/


 Total Parenteral


 Nutrition/Amino


 Acids/Dextrose/


 Fat Emulsion


 Intravenous  1,400 ml @ 


 58.333 mls/


 hr  TPN  CONT  4/9/20 22:00


 4/10/20 21:59 DC 4/9/20 22:08


58.333 MLS/HR


 


 Sodium Chloride


 100 meq/Potassium


 Chloride 40 meq/


 Magnesium Sulfate


 15 meq/Calcium


 Gluconate 15 meq/


 Multivitamins 10


 ml/Chromium/


 Copper/Manganese/


 Seleni/Zn 0.5 ml/


 Insulin Human


 Regular 35 unit/


 Total Parenteral


 Nutrition/Amino


 Acids/Dextrose/


 Fat Emulsion


 Intravenous  1,400 ml @ 


 58.333 mls/


 hr  TPN  CONT  4/19/20 22:00


 4/20/20 21:59 DC 4/19/20 22:46


58.333 MLS/HR


 


 Sodium Chloride


 100 meq/Potassium


 Chloride 40 meq/


 Magnesium Sulfate


 20 meq/Calcium


 Gluconate 10 meq/


 Multivitamins 10


 ml/Chromium/


 Copper/Manganese/


 Seleni/Zn 0.5 ml/


 Insulin Human


 Regular 35 unit/


 Total Parenteral


 Nutrition/Amino


 Acids/Dextrose/


 Fat Emulsion


 Intravenous  1,400 ml @ 


 58.333 mls/


 hr  TPN  CONT  4/23/20 22:00


 4/24/20 21:59  4/24/20 00:06


58.333 MLS/HR


 


 Sodium Chloride


 100 meq/Potassium


 Chloride 40 meq/


 Magnesium Sulfate


 20 meq/Calcium


 Gluconate 15 meq/


 Multivitamins 10


 ml/Chromium/


 Copper/Manganese/


 Seleni/Zn 0.5 ml/


 Insulin Human


 Regular 35 unit/


 Total Parenteral


 Nutrition/Amino


 Acids/Dextrose/


 Fat Emulsion


 Intravenous  1,400 ml @ 


 58.333 mls/


 hr  TPN  CONT  4/22/20 22:00


 4/23/20 21:59 DC 4/22/20 22:27


58.333 MLS/HR


 


 Sodium Chloride


 100 meq/Potassium


 Phosphate 10 mmol/


 Magnesium Sulfate


 12 meq/Calcium


 Gluconate 15 meq/


 Multivitamins 10


 ml/Chromium/


 Copper/Manganese/


 Seleni/Zn 0.5 ml/


 Insulin Human


 Regular 35 unit/


 Potassium


 Chloride 20 meq/


 Total Parenteral


 Nutrition/Amino


 Acids/Dextrose/


 Fat Emulsion


 Intravenous  1,400 ml @ 


 58.333 mls/


 hr  TPN  CONT  4/16/20 22:00


 4/17/20 21:59 DC 4/16/20 22:10


58.333 MLS/HR


 


 Sodium Chloride


 100 meq/Potassium


 Phosphate 19 mmol/


 Magnesium Sulfate


 12 meq/Calcium


 Gluconate 15 meq/


 Multivitamins 10


 ml/Chromium/


 Copper/Manganese/


 Seleni/Zn 0.5 ml/


 Insulin Human


 Regular 40 unit/


 Potassium


 Chloride 20 meq/


 Total Parenteral


 Nutrition/Amino


 Acids/Dextrose/


 Fat Emulsion


 Intravenous  1,400 ml @ 


 58.333 mls/


 hr  TPN  CONT  4/15/20 22:00


 4/16/20 21:59 DC 4/15/20 21:20


58.333 MLS/HR


 


 Sodium Chloride


 100 meq/Potassium


 Phosphate 5 mmol/


 Magnesium Sulfate


 12 meq/Calcium


 Gluconate 15 meq/


 Multivitamins 10


 ml/Chromium/


 Copper/Manganese/


 Seleni/Zn 0.5 ml/


 Insulin Human


 Regular 35 unit/


 Potassium


 Chloride 20 meq/


 Total Parenteral


 Nutrition/Amino


 Acids/Dextrose/


 Fat Emulsion


 Intravenous  1,400 ml @ 


 58.333 mls/


 hr  TPN  CONT  4/17/20 22:00


 4/18/20 21:59 DC 4/17/20 22:59


58.333 MLS/HR


 


 Succinylcholine


 Chloride


  (Anectine)  120 mg  1X  ONCE  3/23/20 08:30


 3/23/20 08:31 DC 3/23/20 08:34


120 MG








Lab





Laboratory Tests








Test


 4/23/20


12:21 4/23/20


17:25 4/24/20


00:11 4/24/20


06:20


 


Glucose (Fingerstick)


 146 mg/dL


(70-99) 130 mg/dL


(70-99) 137 mg/dL


(70-99) 





 


White Blood Count


 


 


 


 11.5 x10^3/uL


(4.0-11.0)


 


Red Blood Count


 


 


 


 2.75 x10^6/uL


(3.50-5.40)


 


Hemoglobin


 


 


 


 8.2 g/dL


(12.0-15.5)


 


Hematocrit


 


 


 


 25.0 %


(36.0-47.0)


 


Mean Corpuscular Volume    91 fL () 


 


Mean Corpuscular Hemoglobin    30 pg (25-35) 


 


Mean Corpuscular Hemoglobin


Concent 


 


 


 33 g/dL


(31-37)


 


Red Cell Distribution Width


 


 


 


 18.6 %


(11.5-14.5)


 


Platelet Count


 


 


 


 287 x10^3/uL


(140-400)


 


Neutrophils (%) (Auto)    83 % (31-73) 


 


Lymphocytes (%) (Auto)    8 % (24-48) 


 


Monocytes (%) (Auto)    8 % (0-9) 


 


Eosinophils (%) (Auto)    0 % (0-3) 


 


Basophils (%) (Auto)    0 % (0-3) 


 


Neutrophils # (Auto)


 


 


 


 9.5 x10^3/uL


(1.8-7.7)


 


Lymphocytes # (Auto)


 


 


 


 0.9 x10^3/uL


(1.0-4.8)


 


Monocytes # (Auto)


 


 


 


 0.9 x10^3/uL


(0.0-1.1)


 


Eosinophils # (Auto)


 


 


 


 0.0 x10^3/uL


(0.0-0.7)


 


Basophils # (Auto)


 


 


 


 0.0 x10^3/uL


(0.0-0.2)


 


Sodium Level


 


 


 


 151 mmol/L


(136-145)


 


Potassium Level


 


 


 


 3.4 mmol/L


(3.5-5.1)


 


Chloride Level


 


 


 


 115 mmol/L


()


 


Carbon Dioxide Level


 


 


 


 24 mmol/L


(21-32)


 


Anion Gap    12 (6-14) 


 


Blood Urea Nitrogen


 


 


 


 83 mg/dL


(7-20)


 


Creatinine


 


 


 


 1.3 mg/dL


(0.6-1.0)


 


Estimated GFR


(Cockcroft-Gault) 


 


 


 43.5 





 


Glucose Level


 


 


 


 142 mg/dL


(70-99)


 


Calcium Level


 


 


 


 8.9 mg/dL


(8.5-10.1)


 


Test


 4/24/20


06:36 


 


 





 


Glucose (Fingerstick)


 121 mg/dL


(70-99) 


 


 











Results


All relevant outside records, renal labs, imaging studies, telemetry/EKG's were 

reviewed.











KIMBERLY MYERS MD                Apr 24, 2020 09:13

## 2020-04-24 NOTE — PDOC
PROGRESS NOTES


Assessment


Assessment


Respiratory failure.


Seizure.


Metabolic encephalopathy.


Fever, recurrent.


Metabolic acidosis.


Diffuse pulmonary infiltrate.


Pleural effusion.


Pancreatitis, necrotizing.


Gallstone.


Leukocytosis.


Lymphopenia.


Electrolytes imbalances.


Hyperglycemia.


DM.


HTN.


HLD.


Anemia.


Abnormal CXR.


Obesity.


Covid-19 still suspected though 1st test negative.





RECOMMENDATIONS/PLAN:


Continue life support in CCU at the present time.


Keppra if has further seizures.


Treat medical diseases.


OT/PT.





EEG: No seizure activity.





OBJECTIVE:


4/24/20: No seizures reported over night.





Past Medical History


Cardiovascular:  HTN, Hyperlipidemia


Endocrine:  Diabetes





Family History


Unobtainable.





Social HistoryU


not obtainable





Allergies


Coded Allergies:  


Codeine (Verified  Allergy, Intermediate, rash, 3/16/20)





ROS


Unobtainable.





NEUROLOGICAL  EXAMINATION:


Sleeping.


Sitting in chair.


Not oriented to time, place and person.


PERRL.


EOMI not examined.


CN: no acute focal findings.


Muscle tone: Decreased.


Muscle strength: 3+ UE, 2+ LE.


DTR: 1-2


Plantar reflex: Neutral response bilaterally 


Gait: not able to walk.


Sensory exam: no response to stimuli..


Not able to access cerebellar signs.


F-T-N test not performed. 


Patient seen in CCU.





Objective


Objective





Vital Signs








  Date Time  Temp Pulse Resp B/P (MAP) Pulse Ox O2 Delivery O2 Flow Rate FiO2


 


4/24/20 11:29     98 Ventilator  


 


4/24/20 06:00 98.0 106  141/77 (98)    





 98.0       


 


4/24/20 03:20   23    6.0 














Intake and Output 


 


 4/24/20





 07:00


 


Intake Total 2067 ml


 


Output Total 2950 ml


 


Balance -883 ml


 


 


 


Intake Oral 0 ml


 


IV Total 1163 ml


 


Blood Product IV Normal Saline Flush 904 ml


 


Output Urine Total 2825 ml


 


Gastric Drainage Total 125 ml











Vitals Signs


Vitals





                          VS - Last 72 Hours, by Label








  Date Time  Temp Pulse Resp B/P (MAP) Pulse Ox O2 Delivery O2 Flow Rate FiO2


 


4/24/20 11:29     98 Ventilator  


 


4/24/20 09:24     95 Ventilator  


 


4/24/20 08:00      Mechanical Ventilator  


 


4/24/20 07:28     100 Ventilator  


 


4/24/20 06:00 98.0 106  141/77 (98) 98 Ventilator  





 98.0       


 


4/24/20 05:27     100   


 


4/24/20 05:00  86  124/71 (88) 100 Ventilator  


 


4/24/20 04:00      Mechanical Ventilator  


 


4/24/20 04:00  78  103/54 (70) 99 Ventilator  


 


4/24/20 03:50     100 Ventilator  


 


4/24/20 03:28     99 Ventilator  


 


4/24/20 03:20   23  100 Ventilator 6.0 


 


4/24/20 03:00  84 21 117/66 (83) 100 Ventilator  


 


4/24/20 02:00  76 21 111/56 (74) 100 Ventilator  


 


4/24/20 01:00  78 21 112/55 (74) 99 Ventilator  


 


4/24/20 00:45     100 Ventilator  


 


4/24/20 00:00      Mechanical Ventilator  


 


4/24/20 00:00 98.8 78 21 102/50 (67) 100 Ventilator  





 98.8       


 


4/23/20 23:00  102 27 157/79 (105) 100 Ventilator  


 


4/23/20 22:00  92 20 156/78 (104) 100 Ventilator  


 


4/23/20 21:00  98 28 153/79 (103) 100 Ventilator  


 


4/23/20 21:00     99 Ventilator  


 


4/23/20 20:31     99  6.0 


 


4/23/20 20:10     99 Ventilator  


 


4/23/20 20:00 99.2 110 36 165/79 (107) 100 Ventilator  





 99.2       


 


4/23/20 20:00      Mechanical Ventilator  


 


4/23/20 19:00  90 24 140/69 (92) 99 Ventilator  


 


4/23/20 18:00  95 29 144/74 (97) 100 C-pap trial  


 


4/23/20 17:54     100 Ventilator  


 


4/23/20 17:00  92 30 142/80 (100) 100 C-pap trial  


 


4/23/20 16:00 100.1 80 26 116/56 (76) 98 C-pap trial  





 100.1       


 


4/23/20 16:00      Mechanical Ventilator  


 


4/23/20 15:42     98 Ventilator  


 


4/23/20 15:00  128 30 171/82 (111) 100 C-pap trial  


 


4/23/20 14:13     98 Ventilator  


 


4/23/20 14:00 100.7 108 28 161/92 (115) 100 C-pap trial  





 100.7       


 


4/23/20 13:00  94 29 149/59 (89) 98 C-pap trial  


 


4/23/20 12:31     100  6.0 


 


4/23/20 12:01     100  6.0 


 


4/23/20 12:00 101.0 128 40 190/146 (161) 98 Ventilator  





 101.0       


 


4/23/20 12:00      Mechanical Ventilator  


 


4/23/20 11:38     100 Ventilator  


 


4/23/20 11:00  86 25 117/63 (81) 99 Ventilator  


 


4/23/20 10:00  108 21 152/89 (110) 100 Ventilator  


 


4/23/20 09:44     98 Ventilator  


 


4/23/20 09:00 101.2 100 21 170/90 (116) 100 Ventilator  





 101.2       


 


4/23/20 08:00  84 22 123/66 (85) 98 Ventilator  


 


4/23/20 08:00      Mechanical Ventilator  


 


4/23/20 07:26     98 Ventilator  


 


4/23/20 07:00  100 24 145/82 (103) 98 Ventilator  











Laboratory


Laboratory





Laboratory Tests








Test


 4/23/20


17:25 4/24/20


00:11 4/24/20


06:20 4/24/20


06:36


 


Glucose (Fingerstick)


 130 mg/dL


(70-99) 137 mg/dL


(70-99) 


 121 mg/dL


(70-99)


 


White Blood Count


 


 


 11.5 x10^3/uL


(4.0-11.0) 





 


Red Blood Count


 


 


 2.75 x10^6/uL


(3.50-5.40) 





 


Hemoglobin


 


 


 8.2 g/dL


(12.0-15.5) 





 


Hematocrit


 


 


 25.0 %


(36.0-47.0) 





 


Mean Corpuscular Volume   91 fL ()  


 


Mean Corpuscular Hemoglobin   30 pg (25-35)  


 


Mean Corpuscular Hemoglobin


Concent 


 


 33 g/dL


(31-37) 





 


Red Cell Distribution Width


 


 


 18.6 %


(11.5-14.5) 





 


Platelet Count


 


 


 287 x10^3/uL


(140-400) 





 


Neutrophils (%) (Auto)   83 % (31-73)  


 


Lymphocytes (%) (Auto)   8 % (24-48)  


 


Monocytes (%) (Auto)   8 % (0-9)  


 


Eosinophils (%) (Auto)   0 % (0-3)  


 


Basophils (%) (Auto)   0 % (0-3)  


 


Neutrophils # (Auto)


 


 


 9.5 x10^3/uL


(1.8-7.7) 





 


Lymphocytes # (Auto)


 


 


 0.9 x10^3/uL


(1.0-4.8) 





 


Monocytes # (Auto)


 


 


 0.9 x10^3/uL


(0.0-1.1) 





 


Eosinophils # (Auto)


 


 


 0.0 x10^3/uL


(0.0-0.7) 





 


Basophils # (Auto)


 


 


 0.0 x10^3/uL


(0.0-0.2) 





 


Sodium Level


 


 


 151 mmol/L


(136-145) 





 


Potassium Level


 


 


 3.4 mmol/L


(3.5-5.1) 





 


Chloride Level


 


 


 115 mmol/L


() 





 


Carbon Dioxide Level


 


 


 24 mmol/L


(21-32) 





 


Anion Gap   12 (6-14)  


 


Blood Urea Nitrogen


 


 


 83 mg/dL


(7-20) 





 


Creatinine


 


 


 1.3 mg/dL


(0.6-1.0) 





 


Estimated GFR


(Cockcroft-Gault) 


 


 43.5 


 





 


Glucose Level


 


 


 142 mg/dL


(70-99) 





 


Calcium Level


 


 


 8.9 mg/dL


(8.5-10.1) 





 


Phosphorus Level


 


 


 3.9 mg/dL


(2.6-4.7) 





 


Magnesium Level


 


 


 2.1 mg/dL


(1.8-2.4) 











Microbiology


4/15/20 Aerobic and Anaerobic Culture - Final, Complete


          


4/15/20 Anaerobic Culture Result 1 (ANSON) - Final, Complete


          


4/15/20 Aerobic Culture - Final, Complete


          


4/15/20 Aerobic Culture Result 1 (ANSON) - Final, Complete


          


4/15/20 Gram Stain - Final, Complete


          


4/15/20 Gram Stain Result 1 (ANSON) - Final, Complete


          


4/15/20 Gram Stain Result 2 (ANSON) - Final, Complete


          


4/14/20 Blood Culture - Final, Complete


          NO GROWTH AFTER 5 DAYS


4/12/20 Urine Culture - Final, Complete


          


4/12/20 Urine Culture Result 1 (ANSON) - Final, Complete





Medication


Medications





Current Medications


Alteplase, Recombinant (Cathflo For Central Catheter Clearance) 1 mg 1X  ONCE 

INT CAT  Last administered on 4/24/20at 11:44;  Start 4/24/20 at 10:45;  Stop 

4/24/20 at 10:46;  Status DC


Fentanyl Citrate (Fentanyl 2ml Vial) 25 mcg PRN Q5MIN  PRN IV MILD PAIN 1-3;  

Start 4/27/20 at 07:00;  Stop 4/28/20 at 06:59


Fentanyl Citrate (Fentanyl 2ml Vial) 50 mcg PRN Q5MIN  PRN IV MODERATE TO SEVERE

PAIN;  Start 4/27/20 at 07:00;  Stop 4/28/20 at 06:59


Lidocaine HCl (Xylocaine-Mpf 1% 2ml Vial) 2 ml PRN 1X  PRN ID PRIOR TO IV START;

 Start 4/27/20 at 07:00;  Stop 4/28/20 at 06:59


Ondansetron HCl (Zofran) 4 mg PRN Q6HRS  PRN IV NAUSEA/VOMITING;  Start 4/27/20 

at 07:00;  Stop 4/28/20 at 06:59


Prochlorperazine Edisylate (Compazine) 5 mg PACU PRN  PRN IV NAUSEA, MRX1;  

Start 4/27/20 at 07:00;  Stop 4/28/20 at 06:59


Ringer's Solution 1,000 ml @  30 mls/hr Q24H IV ;  Start 4/27/20 at 07:00;  Stop

4/27/20 at 18:59


Sodium Acetate 50 meq/Potassium Acetate 55 meq/ Magnesium Sulfate 20 meq/Calcium

Gluconate 10 meq/ Multivitamins 10 ml/Chromium/ Copper/Manganese/ Seleni/Zn 0.5 

ml/ Insulin Human Regular 35 unit/ Total Parenteral Nutrition/Amino 

Acids/Dextrose/ Fat Emulsion Intravenous 1,400 ml @  58.333 mls/ hr TPN  CONT IV

;  Start 4/24/20 at 22:00;  Stop 4/25/20 at 21:59


Sodium Chloride 100 meq/Potassium Chloride 40 meq/ Magnesium Sulfate 20 

meq/Calcium Gluconate 10 meq/ Multivitamins 10 ml/Chromium/ Copper/Manganese/ 

Seleni/Zn 0.5 ml/ Insulin Human Regular 35 unit/ Total Parenteral 

Nutrition/Amino Acids/Dextrose/ Fat Emulsion Intravenous 1,400 ml @  58.333 mls/

hr TPN  CONT IV  Last administered on 4/24/20at 00:06;  Start 4/23/20 at 22:00; 

Stop 4/24/20 at 21:59





Comment


Review of Relevant


I have reviewed the following items josy (where applicable) has been applied.











DORYS LANDA MD                 Apr 24, 2020 13:09

## 2020-04-24 NOTE — PDOC
Subjective:


Subjective:


Indicates discomfort.





Objective:


Objective:


D/w Dr. Davis.


D/w nurse - c/o pain, has been anxious, on CPAP, says Dr. Flores discussed 

surgery w/ her.


Vital Signs:





                                   Vital Signs








  Date Time  Temp Pulse Resp B/P (MAP) Pulse Ox O2 Delivery O2 Flow Rate FiO2


 


4/24/20 09:24     95 Ventilator  


 


4/24/20 06:00 98.0 106  141/77 (98)    





 98.0       


 


4/24/20 03:20   23    6.0 








Labs:





Laboratory Tests








Test


 4/23/20


12:21 4/23/20


17:25 4/24/20


00:11 4/24/20


06:20


 


Glucose (Fingerstick) 146 mg/dL  130 mg/dL  137 mg/dL  


 


White Blood Count    11.5 x10^3/uL 


 


Red Blood Count    2.75 x10^6/uL 


 


Hemoglobin    8.2 g/dL 


 


Hematocrit    25.0 % 


 


Mean Corpuscular Volume    91 fL 


 


Mean Corpuscular Hemoglobin    30 pg 


 


Mean Corpuscular Hemoglobin


Concent 


 


 


 33 g/dL 





 


Red Cell Distribution Width    18.6 % 


 


Platelet Count    287 x10^3/uL 


 


Neutrophils (%) (Auto)    83 % 


 


Lymphocytes (%) (Auto)    8 % 


 


Monocytes (%) (Auto)    8 % 


 


Eosinophils (%) (Auto)    0 % 


 


Basophils (%) (Auto)    0 % 


 


Neutrophils # (Auto)    9.5 x10^3/uL 


 


Lymphocytes # (Auto)    0.9 x10^3/uL 


 


Monocytes # (Auto)    0.9 x10^3/uL 


 


Eosinophils # (Auto)    0.0 x10^3/uL 


 


Basophils # (Auto)    0.0 x10^3/uL 


 


Sodium Level    151 mmol/L 


 


Potassium Level    3.4 mmol/L 


 


Chloride Level    115 mmol/L 


 


Carbon Dioxide Level    24 mmol/L 


 


Anion Gap    12 


 


Blood Urea Nitrogen    83 mg/dL 


 


Creatinine    1.3 mg/dL 


 


Estimated GFR


(Cockcroft-Gault) 


 


 


 43.5 





 


Glucose Level    142 mg/dL 


 


Calcium Level    8.9 mg/dL 


 


Test


 4/24/20


06:36 


 


 





 


Glucose (Fingerstick) 121 mg/dL    











PE:





GEN: chronically ill


LUNGS: trach/CPAP


HEART: mildly tachycardic


ABD: distended


EXTREMITY: hands and feet a bit tremulous


NEURO/PSYCH: awake, waves hello





A/P:


Gallstone pancreatitis w/ necrosis, MOSF


Hypernatremia





--


Plans for OR next week, will follow.





Hemodynamically unstable?:  No


Is patient in severe pain?:  No


Is NPO status required?:  Yes











RAGHAVENDRA MURCIA         Apr 24, 2020 10:35

## 2020-04-24 NOTE — NUR
Pharmacy TPN Dosing Note



S: JESENIA RICH is a 49 year old F Currently receiving Central Continuous TPN started 
03/18/20



B:Pertinent PMH: Necrotizing pancreatitis



Height: 5 feet, 8 inches

Weight: 109.8 kg



Current diet: NPO 



LABS:

Sodium:    151 

Potassium: 3.4 

Chloride:  115 

Calcium:   8.9 

Corrected Calcium: 9.94 

Magnesium: 2.1 

CO2:       24 

SCr:       1.3 

Glucose:   142, 121 

Albumin:   2.7 

AST:       23 

ALT:       11 



TPN FORMULA:

TPN TYPE:  Central Continuous

AMINO ACIDS:         125 gm

DEXTROSE:            225 gm

LIPIDS:              20 gm

SODIUM ACETATE:      50 mEq

POTASSIUM ACETATE:   55 mEq

MAGNESIUM:           20 mEq

CALCIUM:             10 mEq

INSULIN:             35 units

MULTIPLE VITAMIN:    10 ml

TRACE ELEMENTS:      0.5 ml



TPN PLAN:  

-Change chloride salt forms to acetate.

-Serum sodium trending up, reduce NaAC to 50 mEq/day.

-Serum potassium low, increase KAC to 55 mE/day.

-Other electrolytes and blood glucose appear WNL and stable.

-BMP tomorrow.





R: Continue TPN @ current rate and with above formula. 

Will monitor electrolytes, glucose, and tolerance to TPN.



 VI MOREAU RPH, 04/24/20 1216

-------------------------------------------------------------------------------

Addendum: 04/24/20 at 1419 by VI MOREAU RPH PHA

-------------------------------------------------------------------------------

Per instructions from nephrology, increased TPN rate to provide additional free water intake 
to treat hypernatremia. 

Vi Moreau PharmLindenD.

## 2020-04-24 NOTE — PDOC
[facility] PROGRESS NOTE


Chief Complaint


Chief Complaint


Respiratory failure requiring mechanical ventilation (on vent since 3/23)


Tracheostomy


bilateral pleural effusions/pulm edema 


Sepsis


Severe Acute gallstone pancreatitis (not a surgical candidate at this time) with

necrosis


Acute kidney failure now requiring dialysis


Salpingitis


Gallstones (Calculus of gallbladder with acute cholecystitis without 

obstruction)


HTN 


Leukocytosis 


Hypoxia


Uterine fibroid


Intractable pain


Intractable nausea


Covid 19 negative. 


Acute on chronic anemia 


EEG: No seizure activity.


ESRD on HD


Hyperglycemia, persistently in 200s





History of Present Illness


History of Present Illness


4-


Patient seen and examined in the ICU


She remains mechanically ventilated


Spontaneous respirations with 25 of pressure support 30% FiO2


NG to suction


She is on TPN


She is sedated with Precedex


Discussed with RN


Chart reviewed


She remains critically ill








4-


Patient seen and examined in the ICU


She remains mechanically ventilated


AC/18/4 50/30% FiO2


Chart reviewed


Discussed with RN


She is critically ill











4-


Patient seen and examined in the ICU


She has a tracheostomy


AC/18/4 50/30%


Chart reviewed


Discussed with RN


She remains extremely critically ill








4-


Patient seen in ICU room 116 she is currently on dialysis


Tries to mumble is grabbing at her face with her left hand


She is extremely weak cannot communicate well


She is swollen


Chart reviewed


Discussed with RN


Very critically ill











4- patient seen and examined in the ICU





She is on the vent via tracheostomy


AC/14/4 50/35%


Has IV TPN


NG to suction


Wearing mitts for patient safety


Still seems encephalopathic


Chart reviewed


Discussed with RN


She remains critically ill











Ms Tadeo is a 48yo F w/ PMHx HTN, prediabetes who presented to the emergency 

room with complaints of abdominal pain on 3/16/2020. Found with Lipase 24147, 

, , Bilirubin 1.4.


CT abdomen confirms pancreatic inflammation, peripancreatic fluid and 

inflammatory changes around the pancreas consistent with pancreatitis. 

Cholelithiasis and 1.4cm uterine fibroid as well as possible left salpingitis. 

Admitted for further care


GI, General surgery, ID, Pulm consulted.





3/17: No urine output. Added dilaudid for pain, PICC placed per IR. Renal US 

negative.Seen bedside in ICU, given 2L additional NSS and albumin infusion. 

Still hypotensive, started on levophed. Repeat CT abdomen w/ necrosis. 


3/18: O2 saturation 87% on nasal cannula oxygen. Dialysis catheter per 

nephrology


3/19: She is now on BiPAP appears more ill, now on dialysis


3/20: Seen on BiPAP. Her mother and another family member are present and seemed

to be good support for her. Currently on dialysis. Appears critically ill


3/21: Overnight Tmax 101.7 , still on BiPAP FiO2 40%, still on low dose Levophed

gtt, TPN initiated. On dialysis


4/6: Tracheostomy


4/12: S/p tracheostomy on vent spontaneous respirations with 5 of pressure 

support 35% FiO2, rectal tube and a [place], off pressors


4/13: Off pressors. Seen on dialysis this morning. BUN 80. Tracking with her 

eyes. Still on vent via trach.


4/14: BUN 68, Cr 1.7. Temp 100.2F axillary. WBC 9.8. Still on vent via trach. 

Tracking reasonably well. In obvious discomfort. Removed PICC and CVC LIJ and 

replaced. Tips sent for culture. CT chest/abd/pelvis with bilateral pleural 

effusion and ascites.


4/15: Renal function stable. Still on vent. More interactive today. Miming wish 

for food. Plan discussed for thoracentesis/paracentesis with daughters today. 

They were under impression patient was doing worse due to a miscommunication 

which has been clarified over the phone. 4.3L removed.


4/16: BUN 88, Cr 1.8. Much more interactive today. Appears more comfortable 

today.


4/17: Febrile overnight 101.8F. More interactive, still on vent. Asking for ice 

by miming.


4/18: Afebrile overnight. TMax last 24 hours 100.6F. Hb 7.1. Interactive when 

awake.





Afebrile. Hb 7. Not tolerating vent wean. Very interactive, on low dose 

precedex. Excellent UOP over the past 72 hours





Plan:


Cont vent weaning, dialysis


Trach shield during day if ok with pulm. Would recommend ABG after 4 hours to 

r/o CO2 retention, however and still vent overnight





Vitals/I&O


Vitals/I&O:





                                   Vital Signs








  Date Time  Temp Pulse Resp B/P (MAP) Pulse Ox O2 Delivery O2 Flow Rate FiO2


 


4/24/20 09:24     95 Ventilator  


 


4/24/20 06:00 98.0 106  141/77 (98)    





 98.0       


 


4/24/20 03:20   23    6.0 














                                    I & O   


 


 4/23/20 4/23/20 4/24/20





 15:00 23:00 07:00


 


Intake Total 200 ml 963 ml 904 ml


 


Output Total 850 ml 1250 ml 850 ml


 


Balance -650 ml -287 ml 54 ml











Physical Exam


Physical Exam:


GENERAL: Sleeping awakens barely on Precedex


HEENT: Pupils equal, + NGT, oral cavity dry 


NECK:  Trach/vent 


LUNGS: rhonchi 


HEART:  S1, S2, regular 


ABDOMEN: Distended, tender, hypoactive BS, 


: [place] (4/14)


EXTREMITIES: Generalized edema, no cyanosis, SCDs bilaterally 


DERMATOLOGIC:  Warm and dry.  No generalized rash.  


CENTRAL NERVOUS SYSTEM: Extremely weak trying to talk but unable to understand


HDC & LIJ (4/14) clean


General:  Other (opens eyes on exam)


Heart:  Regular rate, Normal S1, Other (increased rate)


Lungs:  Crackles, Other (dimished in BLL  nc at perrl   nose clear   neck trach 

site ok   no lad  no thyromegaly)


Abdomen:  Other (distended, TTP on exam)


Extremities:  Other (ANASARCA)


Skin:  Other (mottling noted to extremities )





Labs


Labs:





Laboratory Tests








Test


 4/23/20


12:21 4/23/20


17:25 4/24/20


00:11 4/24/20


06:20


 


Glucose (Fingerstick)


 146 mg/dL


(70-99) 130 mg/dL


(70-99) 137 mg/dL


(70-99) 





 


White Blood Count


 


 


 


 11.5 x10^3/uL


(4.0-11.0)


 


Red Blood Count


 


 


 


 2.75 x10^6/uL


(3.50-5.40)


 


Hemoglobin


 


 


 


 8.2 g/dL


(12.0-15.5)


 


Hematocrit


 


 


 


 25.0 %


(36.0-47.0)


 


Mean Corpuscular Volume    91 fL () 


 


Mean Corpuscular Hemoglobin    30 pg (25-35) 


 


Mean Corpuscular Hemoglobin


Concent 


 


 


 33 g/dL


(31-37)


 


Red Cell Distribution Width


 


 


 


 18.6 %


(11.5-14.5)


 


Platelet Count


 


 


 


 287 x10^3/uL


(140-400)


 


Neutrophils (%) (Auto)    83 % (31-73) 


 


Lymphocytes (%) (Auto)    8 % (24-48) 


 


Monocytes (%) (Auto)    8 % (0-9) 


 


Eosinophils (%) (Auto)    0 % (0-3) 


 


Basophils (%) (Auto)    0 % (0-3) 


 


Neutrophils # (Auto)


 


 


 


 9.5 x10^3/uL


(1.8-7.7)


 


Lymphocytes # (Auto)


 


 


 


 0.9 x10^3/uL


(1.0-4.8)


 


Monocytes # (Auto)


 


 


 


 0.9 x10^3/uL


(0.0-1.1)


 


Eosinophils # (Auto)


 


 


 


 0.0 x10^3/uL


(0.0-0.7)


 


Basophils # (Auto)


 


 


 


 0.0 x10^3/uL


(0.0-0.2)


 


Sodium Level


 


 


 


 151 mmol/L


(136-145)


 


Potassium Level


 


 


 


 3.4 mmol/L


(3.5-5.1)


 


Chloride Level


 


 


 


 115 mmol/L


()


 


Carbon Dioxide Level


 


 


 


 24 mmol/L


(21-32)


 


Anion Gap    12 (6-14) 


 


Blood Urea Nitrogen


 


 


 


 83 mg/dL


(7-20)


 


Creatinine


 


 


 


 1.3 mg/dL


(0.6-1.0)


 


Estimated GFR


(Cockcroft-Gault) 


 


 


 43.5 





 


Glucose Level


 


 


 


 142 mg/dL


(70-99)


 


Calcium Level


 


 


 


 8.9 mg/dL


(8.5-10.1)


 


Test


 4/24/20


06:36 


 


 





 


Glucose (Fingerstick)


 121 mg/dL


(70-99) 


 


 














Assessment and Plan


Assessmemt and Plan


Problems


Medical Problems:


(1) Acute pancreatitis


Status: Acute  





(2) Cholelithiasis


Status: Acute  





Respiratory failure requiring intubation


Status post tracheostomy


Severe Acute gallstone pancreatitis (she is not a surgical candidate as she is 

too ill)


Acute kidney failure now requiring dialysis


Salpingo--itis


Gallstones (Calculus of gallbladder with acute cholecystitis without 

obstruction)


HTN 


Leukocytosis 


Hypoxia


Uterine fibroid


Hypoxia with respiratory failure


Intractable pain


Intractable nausea





Plan


ICU monitoring


Tracheostomy care


NG suctioning


Vent management per pulm. pressure support as tolerated


Dialysis per nephro


Merrem (4/8) and Zyvox (4/10) and micafungin 


Follow cultures


Trach care


Nutritional support


When necessary levo fed


Neuro following


No plans for sx at present as she is to ill still


Discharge disposition pending (? Eventual LTAC)


She is still critically ill


Prognosis very guarded


Total time 31 min





Comment


Review of Relevant


I have reviewed the following items josy (where applicable) has been applied.


Medications:





Current Medications








 Medications


  (Trade)  Dose


 Ordered  Sig/Yee


 Route


 PRN Reason  Start Time


 Stop Time Status Last Admin


Dose Admin


 


 Sodium Chloride


 100 meq/Potassium


 Chloride 40 meq/


 Magnesium Sulfate


 20 meq/Calcium


 Gluconate 10 meq/


 Multivitamins 10


 ml/Chromium/


 Copper/Manganese/


 Seleni/Zn 0.5 ml/


 Insulin Human


 Regular 35 unit/


 Total Parenteral


 Nutrition/Amino


 Acids/Dextrose/


 Fat Emulsion


 Intravenous  1,400 ml @ 


 58.333 mls/


 hr  TPN  CONT


 IV


   4/23/20 22:00


 4/24/20 21:59  4/24/20 00:06














Hemodynamically unstable?:  No


Is patient in severe pain?:  No


Is NPO status required?:  Yes











HECTOR MASON III DO           Apr 24, 2020 11:14

## 2020-04-24 NOTE — PDOC
SURGICAL PROGRESS NOTE


Subjective


Pt sitting up in chair on vent, responsive


Vital Signs





Vital Signs








  Date Time  Temp Pulse Resp B/P (MAP) Pulse Ox O2 Delivery O2 Flow Rate FiO2


 


4/24/20 11:29     98 Ventilator  


 


4/24/20 06:00 98.0 106  141/77 (98)    





 98.0       


 


4/24/20 03:20   23    6.0 








I&O











Intake and Output 


 


 4/24/20





 07:00


 


Intake Total 2067 ml


 


Output Total 2950 ml


 


Balance -883 ml


 


 


 


Intake Oral 0 ml


 


IV Total 1163 ml


 


Blood Product IV Normal Saline Flush 904 ml


 


Output Urine Total 2825 ml


 


Gastric Drainage Total 125 ml








General:  Alert, No acute distress


Abdomen:  Soft, Other (mild TTP)


Labs





Laboratory Tests








Test


 4/22/20


18:19 4/23/20


00:14 4/23/20


06:20 4/23/20


06:23


 


Glucose (Fingerstick)


 146 mg/dL


(70-99) 141 mg/dL


(70-99) 


 134 mg/dL


(70-99)


 


Sodium Level


 


 


 144 mmol/L


(136-145) 





 


Potassium Level


 


 


 3.6 mmol/L


(3.5-5.1) 





 


Chloride Level


 


 


 107 mmol/L


() 





 


Carbon Dioxide Level


 


 


 26 mmol/L


(21-32) 





 


Anion Gap   11 (6-14)  


 


Blood Urea Nitrogen


 


 


 78 mg/dL


(7-20) 





 


Creatinine


 


 


 1.4 mg/dL


(0.6-1.0) 





 


Estimated GFR


(Cockcroft-Gault) 


 


 40.0 


 





 


Glucose Level


 


 


 146 mg/dL


(70-99) 





 


Calcium Level


 


 


 9.4 mg/dL


(8.5-10.1) 





 


Phosphorus Level


 


 


 3.8 mg/dL


(2.6-4.7) 





 


Triglycerides Level


 


 


 148 mg/dL


(0-150) 





 


Test


 4/23/20


06:35 4/23/20


12:21 4/23/20


17:25 4/24/20


00:11


 


White Blood Count


 15.6 x10^3/uL


(4.0-11.0) 


 


 





 


Red Blood Count


 3.19 x10^6/uL


(3.50-5.40) 


 


 





 


Hemoglobin


 9.4 g/dL


(12.0-15.5) 


 


 





 


Hematocrit


 28.6 %


(36.0-47.0) 


 


 





 


Mean Corpuscular Volume 90 fL ()    


 


Mean Corpuscular Hemoglobin 30 pg (25-35)    


 


Mean Corpuscular Hemoglobin


Concent 33 g/dL


(31-37) 


 


 





 


Red Cell Distribution Width


 18.4 %


(11.5-14.5) 


 


 





 


Platelet Count


 343 x10^3/uL


(140-400) 


 


 





 


Neutrophils (%) (Auto) 82 % (31-73)    


 


Lymphocytes (%) (Auto) 10 % (24-48)    


 


Monocytes (%) (Auto) 8 % (0-9)    


 


Eosinophils (%) (Auto) 0 % (0-3)    


 


Basophils (%) (Auto) 0 % (0-3)    


 


Neutrophils # (Auto)


 12.8 x10^3/uL


(1.8-7.7) 


 


 





 


Lymphocytes # (Auto)


 1.5 x10^3/uL


(1.0-4.8) 


 


 





 


Monocytes # (Auto)


 1.3 x10^3/uL


(0.0-1.1) 


 


 





 


Eosinophils # (Auto)


 0.0 x10^3/uL


(0.0-0.7) 


 


 





 


Basophils # (Auto)


 0.0 x10^3/uL


(0.0-0.2) 


 


 





 


Glucose (Fingerstick)


 


 146 mg/dL


(70-99) 130 mg/dL


(70-99) 137 mg/dL


(70-99)


 


Test


 4/24/20


06:20 4/24/20


06:36 4/24/20


13:11 





 


White Blood Count


 11.5 x10^3/uL


(4.0-11.0) 


 


 





 


Red Blood Count


 2.75 x10^6/uL


(3.50-5.40) 


 


 





 


Hemoglobin


 8.2 g/dL


(12.0-15.5) 


 


 





 


Hematocrit


 25.0 %


(36.0-47.0) 


 


 





 


Mean Corpuscular Volume 91 fL ()    


 


Mean Corpuscular Hemoglobin 30 pg (25-35)    


 


Mean Corpuscular Hemoglobin


Concent 33 g/dL


(31-37) 


 


 





 


Red Cell Distribution Width


 18.6 %


(11.5-14.5) 


 


 





 


Platelet Count


 287 x10^3/uL


(140-400) 


 


 





 


Neutrophils (%) (Auto) 83 % (31-73)    


 


Lymphocytes (%) (Auto) 8 % (24-48)    


 


Monocytes (%) (Auto) 8 % (0-9)    


 


Eosinophils (%) (Auto) 0 % (0-3)    


 


Basophils (%) (Auto) 0 % (0-3)    


 


Neutrophils # (Auto)


 9.5 x10^3/uL


(1.8-7.7) 


 


 





 


Lymphocytes # (Auto)


 0.9 x10^3/uL


(1.0-4.8) 


 


 





 


Monocytes # (Auto)


 0.9 x10^3/uL


(0.0-1.1) 


 


 





 


Eosinophils # (Auto)


 0.0 x10^3/uL


(0.0-0.7) 


 


 





 


Basophils # (Auto)


 0.0 x10^3/uL


(0.0-0.2) 


 


 





 


Sodium Level


 151 mmol/L


(136-145) 


 


 





 


Potassium Level


 3.4 mmol/L


(3.5-5.1) 


 


 





 


Chloride Level


 115 mmol/L


() 


 


 





 


Carbon Dioxide Level


 24 mmol/L


(21-32) 


 


 





 


Anion Gap 12 (6-14)    


 


Blood Urea Nitrogen


 83 mg/dL


(7-20) 


 


 





 


Creatinine


 1.3 mg/dL


(0.6-1.0) 


 


 





 


Estimated GFR


(Cockcroft-Gault) 43.5 


 


 


 





 


Glucose Level


 142 mg/dL


(70-99) 


 


 





 


Calcium Level


 8.9 mg/dL


(8.5-10.1) 


 


 





 


Phosphorus Level


 3.9 mg/dL


(2.6-4.7) 


 


 





 


Magnesium Level


 2.1 mg/dL


(1.8-2.4) 


 


 





 


Glucose (Fingerstick)


 


 121 mg/dL


(70-99) 163 mg/dL


(70-99) 











Laboratory Tests








Test


 4/23/20


17:25 4/24/20


00:11 4/24/20


06:20 4/24/20


06:36


 


Glucose (Fingerstick)


 130 mg/dL


(70-99) 137 mg/dL


(70-99) 


 121 mg/dL


(70-99)


 


White Blood Count


 


 


 11.5 x10^3/uL


(4.0-11.0) 





 


Red Blood Count


 


 


 2.75 x10^6/uL


(3.50-5.40) 





 


Hemoglobin


 


 


 8.2 g/dL


(12.0-15.5) 





 


Hematocrit


 


 


 25.0 %


(36.0-47.0) 





 


Mean Corpuscular Volume   91 fL ()  


 


Mean Corpuscular Hemoglobin   30 pg (25-35)  


 


Mean Corpuscular Hemoglobin


Concent 


 


 33 g/dL


(31-37) 





 


Red Cell Distribution Width


 


 


 18.6 %


(11.5-14.5) 





 


Platelet Count


 


 


 287 x10^3/uL


(140-400) 





 


Neutrophils (%) (Auto)   83 % (31-73)  


 


Lymphocytes (%) (Auto)   8 % (24-48)  


 


Monocytes (%) (Auto)   8 % (0-9)  


 


Eosinophils (%) (Auto)   0 % (0-3)  


 


Basophils (%) (Auto)   0 % (0-3)  


 


Neutrophils # (Auto)


 


 


 9.5 x10^3/uL


(1.8-7.7) 





 


Lymphocytes # (Auto)


 


 


 0.9 x10^3/uL


(1.0-4.8) 





 


Monocytes # (Auto)


 


 


 0.9 x10^3/uL


(0.0-1.1) 





 


Eosinophils # (Auto)


 


 


 0.0 x10^3/uL


(0.0-0.7) 





 


Basophils # (Auto)


 


 


 0.0 x10^3/uL


(0.0-0.2) 





 


Sodium Level


 


 


 151 mmol/L


(136-145) 





 


Potassium Level


 


 


 3.4 mmol/L


(3.5-5.1) 





 


Chloride Level


 


 


 115 mmol/L


() 





 


Carbon Dioxide Level


 


 


 24 mmol/L


(21-32) 





 


Anion Gap   12 (6-14)  


 


Blood Urea Nitrogen


 


 


 83 mg/dL


(7-20) 





 


Creatinine


 


 


 1.3 mg/dL


(0.6-1.0) 





 


Estimated GFR


(Cockcroft-Gault) 


 


 43.5 


 





 


Glucose Level


 


 


 142 mg/dL


(70-99) 





 


Calcium Level


 


 


 8.9 mg/dL


(8.5-10.1) 





 


Phosphorus Level


 


 


 3.9 mg/dL


(2.6-4.7) 





 


Magnesium Level


 


 


 2.1 mg/dL


(1.8-2.4) 





 


Test


 4/24/20


13:11 


 


 





 


Glucose (Fingerstick)


 163 mg/dL


(70-99) 


 


 











Problem List


Problems


Medical Problems:


(1) Acute pancreatitis


Status: Acute  





(2) Cholelithiasis


Status: Acute  








Assessment/Plan


will plan laparoscopic versus open cholecystectomy with cholangiogram and 

pancreatic necrosectomy on 4/27


R/R/B/A d/w pt and pt's daughter via phone extensively.


Other systems appeared to be stabilized


d/w ID











GAMAL ZHOU MD             Apr 24, 2020 14:34

## 2020-04-24 NOTE — PDOC
PULMONARY PROGRESS NOTES


Subjective


Patient intubated on 3/23 , s/p trach 4/6, 


Patient responding to verbal stimuli


Vitals





Vital Signs








  Date Time  Temp Pulse Resp B/P (MAP) Pulse Ox O2 Delivery O2 Flow Rate FiO2


 


4/24/20 07:28     100 Ventilator  


 


4/24/20 06:00 98.0 106  141/77 (98)    





 98.0       


 


4/24/20 03:20   23    6.0 








Comments


ros unable to obtain  on vent


Lungs:  Crackles, Other (dimished in BLL  nc at perrl   nose clear   neck trach 

site ok   no lad  no thyromegaly)


Cardiovascular:  S1, S2


Abdomen:  Soft, Non-tender, Other (distended)


Extremities:  Other (+3 generalized edema )


Skin:  Warm, Dry


Labs





Laboratory Tests








Test


 4/22/20


12:19 4/22/20


18:19 4/23/20


00:14 4/23/20


06:20


 


Glucose (Fingerstick)


 146 mg/dL


(70-99) 146 mg/dL


(70-99) 141 mg/dL


(70-99) 





 


Sodium Level


 


 


 


 144 mmol/L


(136-145)


 


Potassium Level


 


 


 


 3.6 mmol/L


(3.5-5.1)


 


Chloride Level


 


 


 


 107 mmol/L


()


 


Carbon Dioxide Level


 


 


 


 26 mmol/L


(21-32)


 


Anion Gap    11 (6-14) 


 


Blood Urea Nitrogen


 


 


 


 78 mg/dL


(7-20)


 


Creatinine


 


 


 


 1.4 mg/dL


(0.6-1.0)


 


Estimated GFR


(Cockcroft-Gault) 


 


 


 40.0 





 


Glucose Level


 


 


 


 146 mg/dL


(70-99)


 


Calcium Level


 


 


 


 9.4 mg/dL


(8.5-10.1)


 


Phosphorus Level


 


 


 


 3.8 mg/dL


(2.6-4.7)


 


Triglycerides Level


 


 


 


 148 mg/dL


(0-150)


 


Test


 4/23/20


06:23 4/23/20


06:35 4/23/20


12:21 4/23/20


17:25


 


Glucose (Fingerstick)


 134 mg/dL


(70-99) 


 146 mg/dL


(70-99) 130 mg/dL


(70-99)


 


White Blood Count


 


 15.6 x10^3/uL


(4.0-11.0) 


 





 


Red Blood Count


 


 3.19 x10^6/uL


(3.50-5.40) 


 





 


Hemoglobin


 


 9.4 g/dL


(12.0-15.5) 


 





 


Hematocrit


 


 28.6 %


(36.0-47.0) 


 





 


Mean Corpuscular Volume  90 fL ()   


 


Mean Corpuscular Hemoglobin  30 pg (25-35)   


 


Mean Corpuscular Hemoglobin


Concent 


 33 g/dL


(31-37) 


 





 


Red Cell Distribution Width


 


 18.4 %


(11.5-14.5) 


 





 


Platelet Count


 


 343 x10^3/uL


(140-400) 


 





 


Neutrophils (%) (Auto)  82 % (31-73)   


 


Lymphocytes (%) (Auto)  10 % (24-48)   


 


Monocytes (%) (Auto)  8 % (0-9)   


 


Eosinophils (%) (Auto)  0 % (0-3)   


 


Basophils (%) (Auto)  0 % (0-3)   


 


Neutrophils # (Auto)


 


 12.8 x10^3/uL


(1.8-7.7) 


 





 


Lymphocytes # (Auto)


 


 1.5 x10^3/uL


(1.0-4.8) 


 





 


Monocytes # (Auto)


 


 1.3 x10^3/uL


(0.0-1.1) 


 





 


Eosinophils # (Auto)


 


 0.0 x10^3/uL


(0.0-0.7) 


 





 


Basophils # (Auto)


 


 0.0 x10^3/uL


(0.0-0.2) 


 





 


Test


 4/24/20


00:11 4/24/20


06:20 4/24/20


06:36 





 


Glucose (Fingerstick)


 137 mg/dL


(70-99) 


 121 mg/dL


(70-99) 





 


White Blood Count


 


 11.5 x10^3/uL


(4.0-11.0) 


 





 


Red Blood Count


 


 2.75 x10^6/uL


(3.50-5.40) 


 





 


Hemoglobin


 


 8.2 g/dL


(12.0-15.5) 


 





 


Hematocrit


 


 25.0 %


(36.0-47.0) 


 





 


Mean Corpuscular Volume  91 fL ()   


 


Mean Corpuscular Hemoglobin  30 pg (25-35)   


 


Mean Corpuscular Hemoglobin


Concent 


 33 g/dL


(31-37) 


 





 


Red Cell Distribution Width


 


 18.6 %


(11.5-14.5) 


 





 


Platelet Count


 


 287 x10^3/uL


(140-400) 


 





 


Neutrophils (%) (Auto)  83 % (31-73)   


 


Lymphocytes (%) (Auto)  8 % (24-48)   


 


Monocytes (%) (Auto)  8 % (0-9)   


 


Eosinophils (%) (Auto)  0 % (0-3)   


 


Basophils (%) (Auto)  0 % (0-3)   


 


Neutrophils # (Auto)


 


 9.5 x10^3/uL


(1.8-7.7) 


 





 


Lymphocytes # (Auto)


 


 0.9 x10^3/uL


(1.0-4.8) 


 





 


Monocytes # (Auto)


 


 0.9 x10^3/uL


(0.0-1.1) 


 





 


Eosinophils # (Auto)


 


 0.0 x10^3/uL


(0.0-0.7) 


 





 


Basophils # (Auto)


 


 0.0 x10^3/uL


(0.0-0.2) 


 





 


Sodium Level


 


 151 mmol/L


(136-145) 


 





 


Potassium Level


 


 3.4 mmol/L


(3.5-5.1) 


 





 


Chloride Level


 


 115 mmol/L


() 


 





 


Carbon Dioxide Level


 


 24 mmol/L


(21-32) 


 





 


Anion Gap  12 (6-14)   


 


Blood Urea Nitrogen


 


 83 mg/dL


(7-20) 


 





 


Creatinine


 


 1.3 mg/dL


(0.6-1.0) 


 





 


Estimated GFR


(Cockcroft-Gault) 


 43.5 


 


 





 


Glucose Level


 


 142 mg/dL


(70-99) 


 





 


Calcium Level


 


 8.9 mg/dL


(8.5-10.1) 


 











Laboratory Tests








Test


 4/23/20


12:21 4/23/20


17:25 4/24/20


00:11 4/24/20


06:20


 


Glucose (Fingerstick)


 146 mg/dL


(70-99) 130 mg/dL


(70-99) 137 mg/dL


(70-99) 





 


White Blood Count


 


 


 


 11.5 x10^3/uL


(4.0-11.0)


 


Red Blood Count


 


 


 


 2.75 x10^6/uL


(3.50-5.40)


 


Hemoglobin


 


 


 


 8.2 g/dL


(12.0-15.5)


 


Hematocrit


 


 


 


 25.0 %


(36.0-47.0)


 


Mean Corpuscular Volume    91 fL () 


 


Mean Corpuscular Hemoglobin    30 pg (25-35) 


 


Mean Corpuscular Hemoglobin


Concent 


 


 


 33 g/dL


(31-37)


 


Red Cell Distribution Width


 


 


 


 18.6 %


(11.5-14.5)


 


Platelet Count


 


 


 


 287 x10^3/uL


(140-400)


 


Neutrophils (%) (Auto)    83 % (31-73) 


 


Lymphocytes (%) (Auto)    8 % (24-48) 


 


Monocytes (%) (Auto)    8 % (0-9) 


 


Eosinophils (%) (Auto)    0 % (0-3) 


 


Basophils (%) (Auto)    0 % (0-3) 


 


Neutrophils # (Auto)


 


 


 


 9.5 x10^3/uL


(1.8-7.7)


 


Lymphocytes # (Auto)


 


 


 


 0.9 x10^3/uL


(1.0-4.8)


 


Monocytes # (Auto)


 


 


 


 0.9 x10^3/uL


(0.0-1.1)


 


Eosinophils # (Auto)


 


 


 


 0.0 x10^3/uL


(0.0-0.7)


 


Basophils # (Auto)


 


 


 


 0.0 x10^3/uL


(0.0-0.2)


 


Sodium Level


 


 


 


 151 mmol/L


(136-145)


 


Potassium Level


 


 


 


 3.4 mmol/L


(3.5-5.1)


 


Chloride Level


 


 


 


 115 mmol/L


()


 


Carbon Dioxide Level


 


 


 


 24 mmol/L


(21-32)


 


Anion Gap    12 (6-14) 


 


Blood Urea Nitrogen


 


 


 


 83 mg/dL


(7-20)


 


Creatinine


 


 


 


 1.3 mg/dL


(0.6-1.0)


 


Estimated GFR


(Cockcroft-Gault) 


 


 


 43.5 





 


Glucose Level


 


 


 


 142 mg/dL


(70-99)


 


Calcium Level


 


 


 


 8.9 mg/dL


(8.5-10.1)


 


Test


 4/24/20


06:36 


 


 





 


Glucose (Fingerstick)


 121 mg/dL


(70-99) 


 


 











Medications





Active Scripts








 Medications  Dose


 Route/Sig


 Max Daily Dose Days Date Category


 


 Bisoprolol


 Fumarate 5 Mg


 Tablet  10 Mg


 PO DAILY


   3/16/20 Reported











Impression


.


IMPRESSION:


1.  Acute hypoxemic respiratory failure secondary to ARDS status post trach,


2.  Gallstone pancreatitis


3.  Severe metabolic acidosis.stable


4.  Acute kidney injury-stable, ON HD-- continue to improve 


5.  Acute gallstone pancreatitis.


6.  Hypoalbuminemia.


7.  Moderate persistent effusions


8.  Fever-persist.  Per ID, per surgery


9.  Chronic anemia


10. Covid 19 testing negative


11. Moderate to large ascites-S/P paracentisis


S/P paracentisis with 4 liters removed on 4/15/20





Plan


.


Nothing new to add


Patient still febrile, will continue same, apparently she may undergo surgery 

next week


Pressure support trials as tolerated


PT


hep sq and protonix for prophylaxis


Follow surgery recs


Follow ID rec, abx per id


Follow nephrology recs 


Nutritional support per surgery


continue TPN for nutrition 





DVT/GI PPX 


d/w RN/RT











STEVE MIRANDA MD              Apr 24, 2020 09:25

## 2020-04-24 NOTE — PDOC
PULMONARY PROGRESS NOTES


Subjective


Patient intubated on 3/23 , s/p trach 4/6, 


Patient responding to verbal stimuli


Vitals





Vital Signs








  Date Time  Temp Pulse Resp B/P (MAP) Pulse Ox O2 Delivery O2 Flow Rate FiO2


 


4/24/20 07:28     100 Ventilator  


 


4/24/20 06:00 98.0 106  141/77 (98)    





 98.0       


 


4/24/20 03:20   23    6.0 








Comments


ros unable to obtain  on vent


Lungs:  Crackles, Other (dimished in BLL  nc at perrl   nose clear   neck trach 

site ok   no lad  no thyromegaly)


Cardiovascular:  S1, S2


Abdomen:  Soft, Non-tender, Other (distended)


Extremities:  Other (+3 generalized edema )


Skin:  Warm, Dry


Labs





Laboratory Tests








Test


 4/22/20


12:19 4/22/20


18:19 4/23/20


00:14 4/23/20


06:20


 


Glucose (Fingerstick)


 146 mg/dL


(70-99) 146 mg/dL


(70-99) 141 mg/dL


(70-99) 





 


Sodium Level


 


 


 


 144 mmol/L


(136-145)


 


Potassium Level


 


 


 


 3.6 mmol/L


(3.5-5.1)


 


Chloride Level


 


 


 


 107 mmol/L


()


 


Carbon Dioxide Level


 


 


 


 26 mmol/L


(21-32)


 


Anion Gap    11 (6-14) 


 


Blood Urea Nitrogen


 


 


 


 78 mg/dL


(7-20)


 


Creatinine


 


 


 


 1.4 mg/dL


(0.6-1.0)


 


Estimated GFR


(Cockcroft-Gault) 


 


 


 40.0 





 


Glucose Level


 


 


 


 146 mg/dL


(70-99)


 


Calcium Level


 


 


 


 9.4 mg/dL


(8.5-10.1)


 


Phosphorus Level


 


 


 


 3.8 mg/dL


(2.6-4.7)


 


Triglycerides Level


 


 


 


 148 mg/dL


(0-150)


 


Test


 4/23/20


06:23 4/23/20


06:35 4/23/20


12:21 4/23/20


17:25


 


Glucose (Fingerstick)


 134 mg/dL


(70-99) 


 146 mg/dL


(70-99) 130 mg/dL


(70-99)


 


White Blood Count


 


 15.6 x10^3/uL


(4.0-11.0) 


 





 


Red Blood Count


 


 3.19 x10^6/uL


(3.50-5.40) 


 





 


Hemoglobin


 


 9.4 g/dL


(12.0-15.5) 


 





 


Hematocrit


 


 28.6 %


(36.0-47.0) 


 





 


Mean Corpuscular Volume  90 fL ()   


 


Mean Corpuscular Hemoglobin  30 pg (25-35)   


 


Mean Corpuscular Hemoglobin


Concent 


 33 g/dL


(31-37) 


 





 


Red Cell Distribution Width


 


 18.4 %


(11.5-14.5) 


 





 


Platelet Count


 


 343 x10^3/uL


(140-400) 


 





 


Neutrophils (%) (Auto)  82 % (31-73)   


 


Lymphocytes (%) (Auto)  10 % (24-48)   


 


Monocytes (%) (Auto)  8 % (0-9)   


 


Eosinophils (%) (Auto)  0 % (0-3)   


 


Basophils (%) (Auto)  0 % (0-3)   


 


Neutrophils # (Auto)


 


 12.8 x10^3/uL


(1.8-7.7) 


 





 


Lymphocytes # (Auto)


 


 1.5 x10^3/uL


(1.0-4.8) 


 





 


Monocytes # (Auto)


 


 1.3 x10^3/uL


(0.0-1.1) 


 





 


Eosinophils # (Auto)


 


 0.0 x10^3/uL


(0.0-0.7) 


 





 


Basophils # (Auto)


 


 0.0 x10^3/uL


(0.0-0.2) 


 





 


Test


 4/24/20


00:11 4/24/20


06:20 4/24/20


06:36 





 


Glucose (Fingerstick)


 137 mg/dL


(70-99) 


 121 mg/dL


(70-99) 





 


White Blood Count


 


 11.5 x10^3/uL


(4.0-11.0) 


 





 


Red Blood Count


 


 2.75 x10^6/uL


(3.50-5.40) 


 





 


Hemoglobin


 


 8.2 g/dL


(12.0-15.5) 


 





 


Hematocrit


 


 25.0 %


(36.0-47.0) 


 





 


Mean Corpuscular Volume  91 fL ()   


 


Mean Corpuscular Hemoglobin  30 pg (25-35)   


 


Mean Corpuscular Hemoglobin


Concent 


 33 g/dL


(31-37) 


 





 


Red Cell Distribution Width


 


 18.6 %


(11.5-14.5) 


 





 


Platelet Count


 


 287 x10^3/uL


(140-400) 


 





 


Neutrophils (%) (Auto)  83 % (31-73)   


 


Lymphocytes (%) (Auto)  8 % (24-48)   


 


Monocytes (%) (Auto)  8 % (0-9)   


 


Eosinophils (%) (Auto)  0 % (0-3)   


 


Basophils (%) (Auto)  0 % (0-3)   


 


Neutrophils # (Auto)


 


 9.5 x10^3/uL


(1.8-7.7) 


 





 


Lymphocytes # (Auto)


 


 0.9 x10^3/uL


(1.0-4.8) 


 





 


Monocytes # (Auto)


 


 0.9 x10^3/uL


(0.0-1.1) 


 





 


Eosinophils # (Auto)


 


 0.0 x10^3/uL


(0.0-0.7) 


 





 


Basophils # (Auto)


 


 0.0 x10^3/uL


(0.0-0.2) 


 





 


Sodium Level


 


 151 mmol/L


(136-145) 


 





 


Potassium Level


 


 3.4 mmol/L


(3.5-5.1) 


 





 


Chloride Level


 


 115 mmol/L


() 


 





 


Carbon Dioxide Level


 


 24 mmol/L


(21-32) 


 





 


Anion Gap  12 (6-14)   


 


Blood Urea Nitrogen


 


 83 mg/dL


(7-20) 


 





 


Creatinine


 


 1.3 mg/dL


(0.6-1.0) 


 





 


Estimated GFR


(Cockcroft-Gault) 


 43.5 


 


 





 


Glucose Level


 


 142 mg/dL


(70-99) 


 





 


Calcium Level


 


 8.9 mg/dL


(8.5-10.1) 


 











Laboratory Tests








Test


 4/23/20


12:21 4/23/20


17:25 4/24/20


00:11 4/24/20


06:20


 


Glucose (Fingerstick)


 146 mg/dL


(70-99) 130 mg/dL


(70-99) 137 mg/dL


(70-99) 





 


White Blood Count


 


 


 


 11.5 x10^3/uL


(4.0-11.0)


 


Red Blood Count


 


 


 


 2.75 x10^6/uL


(3.50-5.40)


 


Hemoglobin


 


 


 


 8.2 g/dL


(12.0-15.5)


 


Hematocrit


 


 


 


 25.0 %


(36.0-47.0)


 


Mean Corpuscular Volume    91 fL () 


 


Mean Corpuscular Hemoglobin    30 pg (25-35) 


 


Mean Corpuscular Hemoglobin


Concent 


 


 


 33 g/dL


(31-37)


 


Red Cell Distribution Width


 


 


 


 18.6 %


(11.5-14.5)


 


Platelet Count


 


 


 


 287 x10^3/uL


(140-400)


 


Neutrophils (%) (Auto)    83 % (31-73) 


 


Lymphocytes (%) (Auto)    8 % (24-48) 


 


Monocytes (%) (Auto)    8 % (0-9) 


 


Eosinophils (%) (Auto)    0 % (0-3) 


 


Basophils (%) (Auto)    0 % (0-3) 


 


Neutrophils # (Auto)


 


 


 


 9.5 x10^3/uL


(1.8-7.7)


 


Lymphocytes # (Auto)


 


 


 


 0.9 x10^3/uL


(1.0-4.8)


 


Monocytes # (Auto)


 


 


 


 0.9 x10^3/uL


(0.0-1.1)


 


Eosinophils # (Auto)


 


 


 


 0.0 x10^3/uL


(0.0-0.7)


 


Basophils # (Auto)


 


 


 


 0.0 x10^3/uL


(0.0-0.2)


 


Sodium Level


 


 


 


 151 mmol/L


(136-145)


 


Potassium Level


 


 


 


 3.4 mmol/L


(3.5-5.1)


 


Chloride Level


 


 


 


 115 mmol/L


()


 


Carbon Dioxide Level


 


 


 


 24 mmol/L


(21-32)


 


Anion Gap    12 (6-14) 


 


Blood Urea Nitrogen


 


 


 


 83 mg/dL


(7-20)


 


Creatinine


 


 


 


 1.3 mg/dL


(0.6-1.0)


 


Estimated GFR


(Cockcroft-Gault) 


 


 


 43.5 





 


Glucose Level


 


 


 


 142 mg/dL


(70-99)


 


Calcium Level


 


 


 


 8.9 mg/dL


(8.5-10.1)


 


Test


 4/24/20


06:36 


 


 





 


Glucose (Fingerstick)


 121 mg/dL


(70-99) 


 


 











Medications





Active Scripts








 Medications  Dose


 Route/Sig


 Max Daily Dose Days Date Category


 


 Bisoprolol


 Fumarate 5 Mg


 Tablet  10 Mg


 PO DAILY


   3/16/20 Reported











Impression


.


IMPRESSION:


1.  Acute hypoxemic respiratory failure secondary to ARDS status post trach,


2.  Gallstone pancreatitis


3.  Severe metabolic acidosis.stable


4.  Acute kidney injury-stable, ON HD-- continue to improve 


5.  Acute gallstone pancreatitis.


6.  Hypoalbuminemia.


7.  Moderate persistent effusions


8.  Fever-persist.  Per ID, per surgery


9.  Chronic anemia


10. Covid 19 testing negative


11. Moderate to large ascites-S/P paracentisis


S/P paracentisis with 4 liters removed on 4/15/20





Plan


.


Patient still febrile, will continue same, apparently she may undergo surgery 

next week


Pressure support trials as tolerated


PT


hep sq and protonix for prophylaxis


Follow surgery recs


Follow ID rec, abx per id


Follow nephrology recs 


Nutritional support per surgery


continue TPN for nutrition 





DVT/GI PPX 


d/w RN/RT











STEVE MIRANDA MD              Apr 24, 2020 09:25

## 2020-04-24 NOTE — PDOC
Infectious Disease Note


Subjective


Subjective


Not feeling well, abd pain present


Remains on vent via trach,


TPN 


cont to be febrile





ROS


ROS


no n/v/d/





Vital Sign


Vital Signs





Vital Signs








  Date Time  Temp Pulse Resp B/P (MAP) Pulse Ox O2 Delivery O2 Flow Rate FiO2


 


4/24/20 07:28     100 Ventilator  


 


4/24/20 06:00 98.0 106  141/77 (98)    





 98.0       


 


4/24/20 03:20   23    6.0 











Physical Exam


PHYSICAL EXAM


GENERAL: Sleeping awakens barely on Precedex


HEENT: Pupils equal, + NGT, oral cavity dry 


NECK:  Trach/vent 


LUNGS: rhonchi 


HEART:  S1, S2, regular 


ABDOMEN: Distended, tender, hypoactive BS, 


: [place] (4/14)


EXTREMITIES: Generalized edema, no cyanosis, SCDs bilaterally 


DERMATOLOGIC:  Warm and dry.  No generalized rash.  


CENTRAL NERVOUS SYSTEM: Extremely weak trying to talk but unable to understand


HDC & LIJ (4/14) clean





Labs


Lab





Laboratory Tests








Test


 4/23/20


12:21 4/23/20


17:25 4/24/20


00:11 4/24/20


06:20


 


Glucose (Fingerstick)


 146 mg/dL


(70-99) 130 mg/dL


(70-99) 137 mg/dL


(70-99) 





 


White Blood Count


 


 


 


 11.5 x10^3/uL


(4.0-11.0)


 


Red Blood Count


 


 


 


 2.75 x10^6/uL


(3.50-5.40)


 


Hemoglobin


 


 


 


 8.2 g/dL


(12.0-15.5)


 


Hematocrit


 


 


 


 25.0 %


(36.0-47.0)


 


Mean Corpuscular Volume    91 fL () 


 


Mean Corpuscular Hemoglobin    30 pg (25-35) 


 


Mean Corpuscular Hemoglobin


Concent 


 


 


 33 g/dL


(31-37)


 


Red Cell Distribution Width


 


 


 


 18.6 %


(11.5-14.5)


 


Platelet Count


 


 


 


 287 x10^3/uL


(140-400)


 


Neutrophils (%) (Auto)    83 % (31-73) 


 


Lymphocytes (%) (Auto)    8 % (24-48) 


 


Monocytes (%) (Auto)    8 % (0-9) 


 


Eosinophils (%) (Auto)    0 % (0-3) 


 


Basophils (%) (Auto)    0 % (0-3) 


 


Neutrophils # (Auto)


 


 


 


 9.5 x10^3/uL


(1.8-7.7)


 


Lymphocytes # (Auto)


 


 


 


 0.9 x10^3/uL


(1.0-4.8)


 


Monocytes # (Auto)


 


 


 


 0.9 x10^3/uL


(0.0-1.1)


 


Eosinophils # (Auto)


 


 


 


 0.0 x10^3/uL


(0.0-0.7)


 


Basophils # (Auto)


 


 


 


 0.0 x10^3/uL


(0.0-0.2)


 


Sodium Level


 


 


 


 151 mmol/L


(136-145)


 


Potassium Level


 


 


 


 3.4 mmol/L


(3.5-5.1)


 


Chloride Level


 


 


 


 115 mmol/L


()


 


Carbon Dioxide Level


 


 


 


 24 mmol/L


(21-32)


 


Anion Gap    12 (6-14) 


 


Blood Urea Nitrogen


 


 


 


 83 mg/dL


(7-20)


 


Creatinine


 


 


 


 1.3 mg/dL


(0.6-1.0)


 


Estimated GFR


(Cockcroft-Gault) 


 


 


 43.5 





 


Glucose Level


 


 


 


 142 mg/dL


(70-99)


 


Calcium Level


 


 


 


 8.9 mg/dL


(8.5-10.1)


 


Test


 4/24/20


06:36 


 


 





 


Glucose (Fingerstick)


 121 mg/dL


(70-99) 


 


 











Micro


BC neg


Abd culture neg, cell count not done





Objective


Assessment


Leukocytosis 4/10 -improved 


Fever


   - New left IJ/[place] 4/14. PICC removed 4/14


   -? pancreatitis. blood cults 4/10 neg. Urine - neg, 


Ascites s/p paracentesis 4/15 - 4300 ml. cultures neg to date 


Severe acute gallstone pancreatitis with necrosis


   -CT 4/14 necrotizing pancreatitis with fluid and phlegmon at the pancreas 


Hypotension off levaphed 


Julian Pleural effusions


JED requiring HD 


   - s/p RIJ temporary dialysis catheter replacement, 4/2


Vent dependent respiratory failure


   -s/p Trach placement 


   -lung opacities, COVID-19 neg 


Anasarca - worse


Anemia - S/p PRBC 3/26


Hypocalcemia 


Prediabetes


HTN


Diarrhea, C. diff neg 3/23


Anemia - S/p PRBCs





Plan


Plan of Care


cont merrem (4/8) and ,micafungin,  and dapto





Gen surgery following


Maintain aspiration precautions 


Anemia deferred to primary


CBC in am





Critically ill





zyvox changed to dapto , to rule out serotonin sy causing fever,


I think sec to cont fever, and broad coverage, need to consider pancreatic 

necrosectomy


d/w surgery and GI


repeat ct noted





surgery planned for Monday


d/w dr Olivera


D/w nursing











LINN FRANZ MD               Apr 24, 2020 08:09

## 2020-04-25 VITALS — SYSTOLIC BLOOD PRESSURE: 119 MMHG | DIASTOLIC BLOOD PRESSURE: 58 MMHG

## 2020-04-25 VITALS — DIASTOLIC BLOOD PRESSURE: 72 MMHG | SYSTOLIC BLOOD PRESSURE: 124 MMHG

## 2020-04-25 VITALS — DIASTOLIC BLOOD PRESSURE: 71 MMHG | SYSTOLIC BLOOD PRESSURE: 144 MMHG

## 2020-04-25 VITALS — SYSTOLIC BLOOD PRESSURE: 108 MMHG | DIASTOLIC BLOOD PRESSURE: 65 MMHG

## 2020-04-25 VITALS — SYSTOLIC BLOOD PRESSURE: 156 MMHG | DIASTOLIC BLOOD PRESSURE: 68 MMHG

## 2020-04-25 VITALS — DIASTOLIC BLOOD PRESSURE: 57 MMHG | SYSTOLIC BLOOD PRESSURE: 96 MMHG

## 2020-04-25 VITALS — SYSTOLIC BLOOD PRESSURE: 107 MMHG | DIASTOLIC BLOOD PRESSURE: 57 MMHG

## 2020-04-25 VITALS — SYSTOLIC BLOOD PRESSURE: 101 MMHG | DIASTOLIC BLOOD PRESSURE: 59 MMHG

## 2020-04-25 VITALS — SYSTOLIC BLOOD PRESSURE: 112 MMHG | DIASTOLIC BLOOD PRESSURE: 72 MMHG

## 2020-04-25 VITALS — DIASTOLIC BLOOD PRESSURE: 77 MMHG | SYSTOLIC BLOOD PRESSURE: 149 MMHG

## 2020-04-25 VITALS — DIASTOLIC BLOOD PRESSURE: 80 MMHG | SYSTOLIC BLOOD PRESSURE: 133 MMHG

## 2020-04-25 VITALS — SYSTOLIC BLOOD PRESSURE: 169 MMHG | DIASTOLIC BLOOD PRESSURE: 96 MMHG

## 2020-04-25 VITALS — SYSTOLIC BLOOD PRESSURE: 96 MMHG | DIASTOLIC BLOOD PRESSURE: 57 MMHG

## 2020-04-25 VITALS — SYSTOLIC BLOOD PRESSURE: 124 MMHG | DIASTOLIC BLOOD PRESSURE: 66 MMHG

## 2020-04-25 VITALS — SYSTOLIC BLOOD PRESSURE: 146 MMHG | DIASTOLIC BLOOD PRESSURE: 73 MMHG

## 2020-04-25 VITALS — DIASTOLIC BLOOD PRESSURE: 79 MMHG | SYSTOLIC BLOOD PRESSURE: 144 MMHG

## 2020-04-25 VITALS — SYSTOLIC BLOOD PRESSURE: 129 MMHG | DIASTOLIC BLOOD PRESSURE: 81 MMHG

## 2020-04-25 VITALS — DIASTOLIC BLOOD PRESSURE: 67 MMHG | SYSTOLIC BLOOD PRESSURE: 109 MMHG

## 2020-04-25 VITALS — DIASTOLIC BLOOD PRESSURE: 90 MMHG | SYSTOLIC BLOOD PRESSURE: 196 MMHG

## 2020-04-25 VITALS — SYSTOLIC BLOOD PRESSURE: 119 MMHG | DIASTOLIC BLOOD PRESSURE: 66 MMHG

## 2020-04-25 VITALS — DIASTOLIC BLOOD PRESSURE: 55 MMHG | SYSTOLIC BLOOD PRESSURE: 111 MMHG

## 2020-04-25 VITALS — DIASTOLIC BLOOD PRESSURE: 55 MMHG | SYSTOLIC BLOOD PRESSURE: 104 MMHG

## 2020-04-25 VITALS — SYSTOLIC BLOOD PRESSURE: 145 MMHG | DIASTOLIC BLOOD PRESSURE: 78 MMHG

## 2020-04-25 VITALS — SYSTOLIC BLOOD PRESSURE: 107 MMHG | DIASTOLIC BLOOD PRESSURE: 54 MMHG

## 2020-04-25 LAB
BASOPHILS # BLD AUTO: 0 X10^3/UL (ref 0–0.2)
BASOPHILS NFR BLD: 0 % (ref 0–3)
EOSINOPHIL NFR BLD: 0.1 X10^3/UL (ref 0–0.7)
EOSINOPHIL NFR BLD: 1 % (ref 0–3)
ERYTHROCYTE [DISTWIDTH] IN BLOOD BY AUTOMATED COUNT: 18.2 % (ref 11.5–14.5)
HCT VFR BLD CALC: 31.7 % (ref 36–47)
HGB BLD-MCNC: 10.8 G/DL (ref 12–15.5)
LYMPHOCYTES # BLD: 1.2 X10^3/UL (ref 1–4.8)
LYMPHOCYTES NFR BLD AUTO: 13 % (ref 24–48)
MCH RBC QN AUTO: 31 PG (ref 25–35)
MCHC RBC AUTO-ENTMCNC: 34 G/DL (ref 31–37)
MCV RBC AUTO: 91 FL (ref 79–100)
MONO #: 0.7 X10^3/UL (ref 0–1.1)
MONOCYTES NFR BLD: 8 % (ref 0–9)
NEUT #: 7.2 X10^3/UL (ref 1.8–7.7)
NEUTROPHILS NFR BLD AUTO: 78 % (ref 31–73)
PLATELET # BLD AUTO: 237 X10^3/UL (ref 140–400)
RBC # BLD AUTO: 3.5 X10^6/UL (ref 3.5–5.4)
WBC # BLD AUTO: 9.2 X10^3/UL (ref 4–11)

## 2020-04-25 RX ADMIN — DEXMEDETOMIDINE HYDROCHLORIDE PRN MLS/HR: 100 INJECTION, SOLUTION, CONCENTRATE INTRAVENOUS at 04:02

## 2020-04-25 RX ADMIN — HEPARIN SODIUM SCH UNIT: 5000 INJECTION, SOLUTION INTRAVENOUS; SUBCUTANEOUS at 08:35

## 2020-04-25 RX ADMIN — PANTOPRAZOLE SODIUM SCH MG: 40 INJECTION, POWDER, FOR SOLUTION INTRAVENOUS at 07:43

## 2020-04-25 RX ADMIN — Medication PRN EACH: at 12:39

## 2020-04-25 RX ADMIN — MEROPENEM SCH MLS/HR: 500 INJECTION, POWDER, FOR SOLUTION INTRAVENOUS at 21:33

## 2020-04-25 RX ADMIN — INSULIN LISPRO SCH UNITS: 100 INJECTION, SOLUTION INTRAVENOUS; SUBCUTANEOUS at 05:42

## 2020-04-25 RX ADMIN — HEPARIN SODIUM SCH UNIT: 5000 INJECTION, SOLUTION INTRAVENOUS; SUBCUTANEOUS at 20:58

## 2020-04-25 RX ADMIN — DAPTOMYCIN SCH MLS/HR: 500 INJECTION, POWDER, LYOPHILIZED, FOR SOLUTION INTRAVENOUS at 13:17

## 2020-04-25 RX ADMIN — INSULIN LISPRO SCH UNITS: 100 INJECTION, SOLUTION INTRAVENOUS; SUBCUTANEOUS at 00:00

## 2020-04-25 RX ADMIN — MEROPENEM SCH MLS/HR: 500 INJECTION, POWDER, FOR SOLUTION INTRAVENOUS at 09:41

## 2020-04-25 RX ADMIN — DEXMEDETOMIDINE HYDROCHLORIDE PRN MLS/HR: 100 INJECTION, SOLUTION, CONCENTRATE INTRAVENOUS at 08:35

## 2020-04-25 RX ADMIN — DEXMEDETOMIDINE HYDROCHLORIDE PRN MLS/HR: 100 INJECTION, SOLUTION, CONCENTRATE INTRAVENOUS at 06:04

## 2020-04-25 RX ADMIN — FENTANYL CITRATE PRN MCG: 50 INJECTION INTRAMUSCULAR; INTRAVENOUS at 01:15

## 2020-04-25 RX ADMIN — FENTANYL CITRATE PRN MCG: 50 INJECTION INTRAMUSCULAR; INTRAVENOUS at 19:55

## 2020-04-25 RX ADMIN — INSULIN LISPRO SCH UNITS: 100 INJECTION, SOLUTION INTRAVENOUS; SUBCUTANEOUS at 17:54

## 2020-04-25 RX ADMIN — FENTANYL CITRATE PRN MCG: 50 INJECTION INTRAMUSCULAR; INTRAVENOUS at 22:21

## 2020-04-25 RX ADMIN — FENTANYL CITRATE PRN MCG: 50 INJECTION INTRAMUSCULAR; INTRAVENOUS at 10:47

## 2020-04-25 RX ADMIN — DEXMEDETOMIDINE HYDROCHLORIDE PRN MLS/HR: 100 INJECTION, SOLUTION, CONCENTRATE INTRAVENOUS at 17:52

## 2020-04-25 RX ADMIN — DEXMEDETOMIDINE HYDROCHLORIDE PRN MLS/HR: 100 INJECTION, SOLUTION, CONCENTRATE INTRAVENOUS at 12:36

## 2020-04-25 RX ADMIN — DEXMEDETOMIDINE HYDROCHLORIDE PRN MLS/HR: 100 INJECTION, SOLUTION, CONCENTRATE INTRAVENOUS at 00:51

## 2020-04-25 RX ADMIN — DEXMEDETOMIDINE HYDROCHLORIDE PRN MLS/HR: 100 INJECTION, SOLUTION, CONCENTRATE INTRAVENOUS at 15:07

## 2020-04-25 RX ADMIN — INSULIN LISPRO SCH UNITS: 100 INJECTION, SOLUTION INTRAVENOUS; SUBCUTANEOUS at 12:00

## 2020-04-25 RX ADMIN — FENTANYL CITRATE PRN MCG: 50 INJECTION INTRAMUSCULAR; INTRAVENOUS at 15:42

## 2020-04-25 RX ADMIN — DEXTROSE SCH MLS/HR: 50 INJECTION, SOLUTION INTRAVENOUS at 08:34

## 2020-04-25 RX ADMIN — DEXMEDETOMIDINE HYDROCHLORIDE PRN MLS/HR: 100 INJECTION, SOLUTION, CONCENTRATE INTRAVENOUS at 20:58

## 2020-04-25 NOTE — PDOC
PROGRESS NOTES


Assessment


Assessment


Respiratory failure.


Seizure.


Metabolic encephalopathy.


Fever, recurrent.


Metabolic acidosis.


Diffuse pulmonary infiltrate.


Pleural effusion.


Pancreatitis, necrotizing.


Gallstone.


Leukocytosis.


Lymphopenia.


Electrolytes imbalances.


Hyperglycemia.


DM.


HTN.


HLD.


Anemia.


Abnormal CXR.


Obesity.


Covid-19 1st test negative.





RECOMMENDATIONS/PLAN:


Continue life support in CCU at the present time.


Keppra if has further seizures.


Treat medical diseases.


OT/PT.





EEG: No seizure activity.





OBJECTIVE:


4/25/20: No seizures reported over night.





Past Medical History


Cardiovascular:  HTN, Hyperlipidemia


Endocrine:  Diabetes





Family History


Unobtainable.





Social HistoryU


not obtainable





Allergies


Coded Allergies:  


Codeine (Verified  Allergy, Intermediate, rash, 3/16/20)





ROS


Unobtainable.





NEUROLOGICAL  EXAMINATION:


Sleepiness but arousable.


Not fully oriented to time, place and person.


PERRL.


EOMI not examined.


CN: no acute focal findings.


Muscle tone: Decreased.


Muscle strength: 3- UE, 2 LE.


DTR: 1-2


Plantar reflex: Neutral response bilaterally 


Gait: not able to walk.


Sensory exam: no response to stimuli..


Not able to access cerebellar signs.


F-T-N test not performed. 


Patient seen in CCU.





Objective


Objective





Vital Signs








  Date Time  Temp Pulse Resp B/P (MAP) Pulse Ox O2 Delivery O2 Flow Rate FiO2


 


4/25/20 15:53     100 Ventilator  


 


4/25/20 15:42   32     


 


4/25/20 15:00  95  145/78 (100)    


 


4/25/20 12:00 101.3       





 101.3       


 


4/24/20 14:05       6.0 














Intake and Output 


 


 4/25/20





 07:00


 


Intake Total 726 ml


 


Output Total 2075 ml


 


Balance -1349 ml


 


 


 


Intake Oral 0 ml


 


IV Total 726 ml


 


Output Urine Total 2075 ml











Vitals Signs


Vitals





                          VS - Last 72 Hours, by Label








  Date Time  Temp Pulse Resp B/P (MAP) Pulse Ox O2 Delivery O2 Flow Rate FiO2


 


4/25/20 15:53     100 Ventilator  


 


4/25/20 15:42   32  99 Ventilator  


 


4/25/20 15:00  95 22 145/78 (100) 100 CPAP Trial  


 


4/25/20 14:00  85 21 124/66 (85) 98 CPAP Trial  


 


4/25/20 13:00  93 25 156/68 (97) 99 CPAP Trial  


 


4/25/20 12:00      Mechanical Ventilator  


 


4/25/20 12:00 101.3 124 34 196/90 (125) 97 CPAP Trial  





 101.3       


 


4/25/20 11:31     97 Ventilator  


 


4/25/20 11:20   27  98 Ventilator  


 


4/25/20 11:00  100 29 169/96 (120) 97 Ventilator  


 


4/25/20 10:47   30  99 Ventilator  


 


4/25/20 10:00  78 27 112/72 (85) 98 Ventilator  


 


4/25/20 09:00  79 26 108/65 (79) 98 Ventilator  


 


4/25/20 08:00 101.0 82 28 109/67 (81) 97 Ventilator  





 101.0       


 


4/25/20 08:00      Mechanical Ventilator  


 


4/25/20 07:52     98 Ventilator  


 


4/25/20 07:00  81 28 104/55 (71) 97 Ventilator  


 


4/25/20 06:00  82 30 101/59 (73) 97 Ventilator  


 


4/25/20 05:00  80 29 96/57 (70) 97 Ventilator  


 


4/25/20 04:00 100.0 86 22 107/54 (71) 97 Ventilator  





 100.0       


 


4/25/20 04:00      Mechanical Ventilator  


 


4/25/20 03:00  80 27 96/57 (70) 96 Ventilator  


 


4/25/20 02:00  86 30 111/55 (73) 97 Ventilator  


 


4/25/20 01:15   22  97 Ventilator  


 


4/25/20 01:00  86 30 119/58 (78) 96 Ventilator  


 


4/25/20 00:40     99 Ventilator  


 


4/25/20 00:03 100.0 82 22 107/57 (74) 98 Ventilator  





 100.0       


 


4/25/20 00:00      Mechanical Ventilator  


 


4/24/20 23:00  80 25 98/57 (71) 97 Ventilator  


 


4/24/20 22:00  82 26 131/73 (92) 97 Ventilator  


 


4/24/20 21:00  76 23  96 Ventilator  


 


4/24/20 20:47     96   


 


4/24/20 20:00 99.5 98 27 122/60 (80) 96 Ventilator  





 99.5       


 


4/24/20 20:00      Mechanical Ventilator  


 


4/24/20 19:00  88 24 131/66 (87) 100 Ventilator  


 


4/24/20 18:11     100 TRILOGY  


 


4/24/20 18:00  74 18 99/62 (74) 100 Ventilator  


 


4/24/20 17:00  76 18 98/57 (71) 100 Ventilator  


 


4/24/20 16:02     98 Ventilator  


 


4/24/20 16:00  82 18 100/65 (77) 100 Ventilator  


 


4/24/20 15:00  108 17 184/84 (117) 100 C-pap with PS  


 


4/24/20 15:00      Mechanical Ventilator  


 


4/24/20 14:05     100  6.0 


 


4/24/20 14:00 98.9 112 30 139/78 (98) 100 C-pap with PS  





 98.9       


 


4/24/20 12:00 100.2 90 21 134/75 (94) 98 C-pap with PS  





 100.2       


 


4/24/20 12:00      Mechanical Ventilator  


 


4/24/20 11:29     98 Ventilator  


 


4/24/20 11:00  146 28 152/142 (145) 97 C-pap with PS  


 


4/24/20 10:00  88 32 122/61 (81) 99 C-pap with PS  


 


4/24/20 09:24     95 Ventilator  


 


4/24/20 09:00  96 20 142/98 (113) 100 C-pap with PS  


 


4/24/20 08:00 99.9 76 22 113/68 (83) 100 Ventilator  





 99.9       


 


4/24/20 08:00      Mechanical Ventilator  


 


4/24/20 07:28     100 Ventilator  


 


4/24/20 07:00  90 20 140/71 (94) 100 Ventilator  











Laboratory


Laboratory





Laboratory Tests








Test


 4/25/20


00:54 4/25/20


05:37 4/25/20


06:00 4/25/20


12:02


 


Glucose (Fingerstick)


 121 mg/dL


(70-99) 123 mg/dL


(70-99) 


 142 mg/dL


(70-99)


 


White Blood Count


 


 


 9.2 x10^3/uL


(4.0-11.0) 





 


Red Blood Count


 


 


 3.50 x10^6/uL


(3.50-5.40) 





 


Hemoglobin


 


 


 10.8 g/dL


(12.0-15.5) 





 


Hematocrit


 


 


 31.7 %


(36.0-47.0) 





 


Mean Corpuscular Volume   91 fL ()  


 


Mean Corpuscular Hemoglobin   31 pg (25-35)  


 


Mean Corpuscular Hemoglobin


Concent 


 


 34 g/dL


(31-37) 





 


Red Cell Distribution Width


 


 


 18.2 %


(11.5-14.5) 





 


Platelet Count


 


 


 237 x10^3/uL


(140-400) 





 


Neutrophils (%) (Auto)   78 % (31-73)  


 


Lymphocytes (%) (Auto)   13 % (24-48)  


 


Monocytes (%) (Auto)   8 % (0-9)  


 


Eosinophils (%) (Auto)   1 % (0-3)  


 


Basophils (%) (Auto)   0 % (0-3)  


 


Neutrophils # (Auto)


 


 


 7.2 x10^3/uL


(1.8-7.7) 





 


Lymphocytes # (Auto)


 


 


 1.2 x10^3/uL


(1.0-4.8) 





 


Monocytes # (Auto)


 


 


 0.7 x10^3/uL


(0.0-1.1) 





 


Eosinophils # (Auto)


 


 


 0.1 x10^3/uL


(0.0-0.7) 





 


Basophils # (Auto)


 


 


 0.0 x10^3/uL


(0.0-0.2) 











Microbiology


4/15/20 Aerobic and Anaerobic Culture - Final, Complete


          


4/15/20 Anaerobic Culture Result 1 (ANSON) - Final, Complete


          


4/15/20 Aerobic Culture - Final, Complete


          


4/15/20 Aerobic Culture Result 1 (ANSON) - Final, Complete


          


4/15/20 Gram Stain - Final, Complete


          


4/15/20 Gram Stain Result 1 (ANSON) - Final, Complete


          


4/15/20 Gram Stain Result 2 (ANSON) - Final, Complete


          


4/14/20 Blood Culture - Final, Complete


          NO GROWTH AFTER 5 DAYS


4/12/20 Urine Culture - Final, Complete


          


4/12/20 Urine Culture Result 1 (ANSON) - Final, Complete





Medication


Medications





Current Medications


Daptomycin 430 mg/ Sodium Chloride 50 ml @  100 mls/hr Q24H IV  Last 

administered on 4/25/20at 13:17;  Start 4/25/20 at 13:00


Fentanyl Citrate (Fentanyl 2ml Vial) 25 mcg PRN Q5MIN  PRN IV MILD PAIN 1-3;  

Start 4/27/20 at 07:00;  Stop 4/28/20 at 06:59


Fentanyl Citrate (Fentanyl 2ml Vial) 50 mcg PRN Q5MIN  PRN IV MODERATE TO SEVERE

PAIN;  Start 4/27/20 at 07:00;  Stop 4/28/20 at 06:59


Lidocaine HCl (Xylocaine-Mpf 1% 2ml Vial) 2 ml PRN 1X  PRN ID PRIOR TO IV START;

 Start 4/27/20 at 07:00;  Stop 4/28/20 at 06:59


Ondansetron HCl (Zofran) 4 mg PRN Q6HRS  PRN IV NAUSEA/VOMITING;  Start 4/27/20 

at 07:00;  Stop 4/28/20 at 06:59


Prochlorperazine Edisylate (Compazine) 5 mg PACU PRN  PRN IV NAUSEA, MRX1;  

Start 4/27/20 at 07:00;  Stop 4/28/20 at 06:59


Ringer's Solution 1,000 ml @  30 mls/hr Q24H IV ;  Start 4/27/20 at 07:00;  Stop

4/27/20 at 18:59


Sodium Acetate 50 meq/Potassium Acetate 55 meq/ Magnesium Sulfate 20 meq/Calcium

Gluconate 10 meq/ Multivitamins 10 ml/Chromium/ Copper/Manganese/ Seleni/Zn 0.5 

ml/ Insulin Human Regular 35 unit/ Total Parenteral Nutrition/Amino 

Acids/Dextrose/ Fat Emulsion Intravenous 1,400 ml @  58.333 mls/ hr TPN  CONT IV

;  Start 4/24/20 at 22:00;  Stop 4/24/20 at 14:15;  Status DC


Sodium Acetate 50 meq/Potassium Acetate 55 meq/ Magnesium Sulfate 20 meq/Calcium

Gluconate 10 meq/ Multivitamins 10 ml/Chromium/ Copper/Manganese/ Seleni/Zn 0.5 

ml/ Insulin Human Regular 35 unit/ Total Parenteral Nutrition/Amino 

Acids/Dextrose/ Fat Emulsion Intravenous 1,800 ml @  75 mls/hr TPN  CONT IV  

Last administered on 4/24/20at 22:38;  Start 4/24/20 at 22:00;  Stop 4/25/20 at 

21:59


Sodium Acetate 50 meq/Potassium Acetate 55 meq/ Magnesium Sulfate 20 meq/Calcium

Gluconate 10 meq/ Multivitamins 10 ml/Chromium/ Copper/Manganese/ Seleni/Zn 0.5 

ml/ Insulin Human Regular 35 unit/ Total Parenteral Nutrition/Amino 

Acids/Dextrose/ Fat Emulsion Intravenous 1,800 ml @  75 mls/hr TPN  CONT IV ;  

Start 4/25/20 at 22:00;  Stop 4/26/20 at 21:59





Comment


Review of Relevant


I have reviewed the following items josy (where applicable) has been applied.











DORYS LANDA MD                 Apr 25, 2020 16:46

## 2020-04-25 NOTE — PDOC
PULMONARY PROGRESS NOTES


Subjective


disregard this note





Patient intubated on 3/23 , s/p trach 4/6, on vent


tolerated ps for 8 hrs yesterday, on predecex, small trach secretion


Vitals





Vital Signs








  Date Time  Temp Pulse Resp B/P (MAP) Pulse Ox O2 Delivery O2 Flow Rate FiO2


 


4/25/20 09:00  79 26 108/65 (79) 98 Ventilator  


 


4/25/20 08:00 101.0       





 101.0       


 


4/24/20 14:05       6.0 








Comments


ros unable to obtain  on vent


Lungs:  Crackles, Other (dimished in BLL  nc at perrl   nose clear   neck trach 

site ok   no lad  no thyromegaly)


Cardiovascular:  S1, S2


Abdomen:  Soft, Non-tender, Other (distended)


Extremities:  Other (+3 generalized edema )


Skin:  Warm, Dry


Labs





Laboratory Tests








Test


 4/23/20


12:21 4/23/20


17:25 4/24/20


00:11 4/24/20


06:20


 


Glucose (Fingerstick)


 146 mg/dL


(70-99) 130 mg/dL


(70-99) 137 mg/dL


(70-99) 





 


White Blood Count


 


 


 


 11.5 x10^3/uL


(4.0-11.0)


 


Red Blood Count


 


 


 


 2.75 x10^6/uL


(3.50-5.40)


 


Hemoglobin


 


 


 


 8.2 g/dL


(12.0-15.5)


 


Hematocrit


 


 


 


 25.0 %


(36.0-47.0)


 


Mean Corpuscular Volume    91 fL () 


 


Mean Corpuscular Hemoglobin    30 pg (25-35) 


 


Mean Corpuscular Hemoglobin


Concent 


 


 


 33 g/dL


(31-37)


 


Red Cell Distribution Width


 


 


 


 18.6 %


(11.5-14.5)


 


Platelet Count


 


 


 


 287 x10^3/uL


(140-400)


 


Neutrophils (%) (Auto)    83 % (31-73) 


 


Lymphocytes (%) (Auto)    8 % (24-48) 


 


Monocytes (%) (Auto)    8 % (0-9) 


 


Eosinophils (%) (Auto)    0 % (0-3) 


 


Basophils (%) (Auto)    0 % (0-3) 


 


Neutrophils # (Auto)


 


 


 


 9.5 x10^3/uL


(1.8-7.7)


 


Lymphocytes # (Auto)


 


 


 


 0.9 x10^3/uL


(1.0-4.8)


 


Monocytes # (Auto)


 


 


 


 0.9 x10^3/uL


(0.0-1.1)


 


Eosinophils # (Auto)


 


 


 


 0.0 x10^3/uL


(0.0-0.7)


 


Basophils # (Auto)


 


 


 


 0.0 x10^3/uL


(0.0-0.2)


 


Sodium Level


 


 


 


 151 mmol/L


(136-145)


 


Potassium Level


 


 


 


 3.4 mmol/L


(3.5-5.1)


 


Chloride Level


 


 


 


 115 mmol/L


()


 


Carbon Dioxide Level


 


 


 


 24 mmol/L


(21-32)


 


Anion Gap    12 (6-14) 


 


Blood Urea Nitrogen


 


 


 


 83 mg/dL


(7-20)


 


Creatinine


 


 


 


 1.3 mg/dL


(0.6-1.0)


 


Estimated GFR


(Cockcroft-Gault) 


 


 


 43.5 





 


Glucose Level


 


 


 


 142 mg/dL


(70-99)


 


Calcium Level


 


 


 


 8.9 mg/dL


(8.5-10.1)


 


Phosphorus Level


 


 


 


 3.9 mg/dL


(2.6-4.7)


 


Magnesium Level


 


 


 


 2.1 mg/dL


(1.8-2.4)


 


Test


 4/24/20


06:36 4/24/20


13:11 4/25/20


00:54 4/25/20


05:37


 


Glucose (Fingerstick)


 121 mg/dL


(70-99) 163 mg/dL


(70-99) 121 mg/dL


(70-99) 123 mg/dL


(70-99)


 


Test


 4/25/20


06:00 


 


 





 


White Blood Count


 9.2 x10^3/uL


(4.0-11.0) 


 


 





 


Red Blood Count


 3.50 x10^6/uL


(3.50-5.40) 


 


 





 


Hemoglobin


 10.8 g/dL


(12.0-15.5) 


 


 





 


Hematocrit


 31.7 %


(36.0-47.0) 


 


 





 


Mean Corpuscular Volume 91 fL ()    


 


Mean Corpuscular Hemoglobin 31 pg (25-35)    


 


Mean Corpuscular Hemoglobin


Concent 34 g/dL


(31-37) 


 


 





 


Red Cell Distribution Width


 18.2 %


(11.5-14.5) 


 


 





 


Platelet Count


 237 x10^3/uL


(140-400) 


 


 





 


Neutrophils (%) (Auto) 78 % (31-73)    


 


Lymphocytes (%) (Auto) 13 % (24-48)    


 


Monocytes (%) (Auto) 8 % (0-9)    


 


Eosinophils (%) (Auto) 1 % (0-3)    


 


Basophils (%) (Auto) 0 % (0-3)    


 


Neutrophils # (Auto)


 7.2 x10^3/uL


(1.8-7.7) 


 


 





 


Lymphocytes # (Auto)


 1.2 x10^3/uL


(1.0-4.8) 


 


 





 


Monocytes # (Auto)


 0.7 x10^3/uL


(0.0-1.1) 


 


 





 


Eosinophils # (Auto)


 0.1 x10^3/uL


(0.0-0.7) 


 


 





 


Basophils # (Auto)


 0.0 x10^3/uL


(0.0-0.2) 


 


 











Laboratory Tests








Test


 4/24/20


13:11 4/25/20


00:54 4/25/20


05:37 4/25/20


06:00


 


Glucose (Fingerstick)


 163 mg/dL


(70-99) 121 mg/dL


(70-99) 123 mg/dL


(70-99) 





 


White Blood Count


 


 


 


 9.2 x10^3/uL


(4.0-11.0)


 


Red Blood Count


 


 


 


 3.50 x10^6/uL


(3.50-5.40)


 


Hemoglobin


 


 


 


 10.8 g/dL


(12.0-15.5)


 


Hematocrit


 


 


 


 31.7 %


(36.0-47.0)


 


Mean Corpuscular Volume    91 fL () 


 


Mean Corpuscular Hemoglobin    31 pg (25-35) 


 


Mean Corpuscular Hemoglobin


Concent 


 


 


 34 g/dL


(31-37)


 


Red Cell Distribution Width


 


 


 


 18.2 %


(11.5-14.5)


 


Platelet Count


 


 


 


 237 x10^3/uL


(140-400)


 


Neutrophils (%) (Auto)    78 % (31-73) 


 


Lymphocytes (%) (Auto)    13 % (24-48) 


 


Monocytes (%) (Auto)    8 % (0-9) 


 


Eosinophils (%) (Auto)    1 % (0-3) 


 


Basophils (%) (Auto)    0 % (0-3) 


 


Neutrophils # (Auto)


 


 


 


 7.2 x10^3/uL


(1.8-7.7)


 


Lymphocytes # (Auto)


 


 


 


 1.2 x10^3/uL


(1.0-4.8)


 


Monocytes # (Auto)


 


 


 


 0.7 x10^3/uL


(0.0-1.1)


 


Eosinophils # (Auto)


 


 


 


 0.1 x10^3/uL


(0.0-0.7)


 


Basophils # (Auto)


 


 


 


 0.0 x10^3/uL


(0.0-0.2)








Medications





Active Scripts








 Medications  Dose


 Route/Sig


 Max Daily Dose Days Date Category


 


 Bisoprolol


 Fumarate 5 Mg


 Tablet  10 Mg


 PO DAILY


   3/16/20 Reported











Impression


.


IMPRESSION:


1.  Acute hypoxemic respiratory failure secondary to ARDS status post trach,


2.  Gallstone pancreatitis


3.  Severe metabolic acidosis.stable


4.  Acute kidney injury-stable, ON HD-- continue to improve 


5.  Acute gallstone pancreatitis.


6.  Hypoalbuminemia.


7.  Moderate persistent effusions


8.  Fever-persist.  Per ID, per surgery


9.  Chronic anemia


10. Covid 19 testing negative


11. Moderate to large ascites-S/P paracentisis


S/P paracentisis with 4 liters removed on 4/15/20





Plan


.


Nothing new to add


Patient still febrile, will continue same, apparently she may undergo surgery 

next week


Pressure support trials as tolerated


PT


hep sq and protonix for prophylaxis


Follow surgery recs


Follow ID rec, abx per id


Follow nephrology recs 


Nutritional support per surgery


continue TPN for nutrition 





DVT/GI PPX 


d/w RN/RT











MAN BLAKE MD               Apr 25, 2020 10:49

## 2020-04-25 NOTE — PDOC
SURGICAL PROGRESS NOTE


Subjective


Pt awake on vent


Vital Signs





Vital Signs








  Date Time  Temp Pulse Resp B/P (MAP) Pulse Ox O2 Delivery O2 Flow Rate FiO2


 


4/25/20 10:47   30  99 Ventilator  


 


4/25/20 09:00  79  108/65 (79)    


 


4/25/20 08:00 101.0       





 101.0       


 


4/24/20 14:05       6.0 








I&O











Intake and Output 


 


 4/25/20





 07:00


 


Intake Total 726 ml


 


Output Total 2075 ml


 


Balance -1349 ml


 


 


 


Intake Oral 0 ml


 


IV Total 726 ml


 


Output Urine Total 2075 ml








General:  Alert, mild distress


Abdomen:  Soft, Other (mild TTP)


Labs





Laboratory Tests








Test


 4/23/20


12:21 4/23/20


17:25 4/24/20


00:11 4/24/20


06:20


 


Glucose (Fingerstick)


 146 mg/dL


(70-99) 130 mg/dL


(70-99) 137 mg/dL


(70-99) 





 


White Blood Count


 


 


 


 11.5 x10^3/uL


(4.0-11.0)


 


Red Blood Count


 


 


 


 2.75 x10^6/uL


(3.50-5.40)


 


Hemoglobin


 


 


 


 8.2 g/dL


(12.0-15.5)


 


Hematocrit


 


 


 


 25.0 %


(36.0-47.0)


 


Mean Corpuscular Volume    91 fL () 


 


Mean Corpuscular Hemoglobin    30 pg (25-35) 


 


Mean Corpuscular Hemoglobin


Concent 


 


 


 33 g/dL


(31-37)


 


Red Cell Distribution Width


 


 


 


 18.6 %


(11.5-14.5)


 


Platelet Count


 


 


 


 287 x10^3/uL


(140-400)


 


Neutrophils (%) (Auto)    83 % (31-73) 


 


Lymphocytes (%) (Auto)    8 % (24-48) 


 


Monocytes (%) (Auto)    8 % (0-9) 


 


Eosinophils (%) (Auto)    0 % (0-3) 


 


Basophils (%) (Auto)    0 % (0-3) 


 


Neutrophils # (Auto)


 


 


 


 9.5 x10^3/uL


(1.8-7.7)


 


Lymphocytes # (Auto)


 


 


 


 0.9 x10^3/uL


(1.0-4.8)


 


Monocytes # (Auto)


 


 


 


 0.9 x10^3/uL


(0.0-1.1)


 


Eosinophils # (Auto)


 


 


 


 0.0 x10^3/uL


(0.0-0.7)


 


Basophils # (Auto)


 


 


 


 0.0 x10^3/uL


(0.0-0.2)


 


Sodium Level


 


 


 


 151 mmol/L


(136-145)


 


Potassium Level


 


 


 


 3.4 mmol/L


(3.5-5.1)


 


Chloride Level


 


 


 


 115 mmol/L


()


 


Carbon Dioxide Level


 


 


 


 24 mmol/L


(21-32)


 


Anion Gap    12 (6-14) 


 


Blood Urea Nitrogen


 


 


 


 83 mg/dL


(7-20)


 


Creatinine


 


 


 


 1.3 mg/dL


(0.6-1.0)


 


Estimated GFR


(Cockcroft-Gault) 


 


 


 43.5 





 


Glucose Level


 


 


 


 142 mg/dL


(70-99)


 


Calcium Level


 


 


 


 8.9 mg/dL


(8.5-10.1)


 


Phosphorus Level


 


 


 


 3.9 mg/dL


(2.6-4.7)


 


Magnesium Level


 


 


 


 2.1 mg/dL


(1.8-2.4)


 


Test


 4/24/20


06:36 4/24/20


13:11 4/25/20


00:54 4/25/20


05:37


 


Glucose (Fingerstick)


 121 mg/dL


(70-99) 163 mg/dL


(70-99) 121 mg/dL


(70-99) 123 mg/dL


(70-99)


 


Test


 4/25/20


06:00 


 


 





 


White Blood Count


 9.2 x10^3/uL


(4.0-11.0) 


 


 





 


Red Blood Count


 3.50 x10^6/uL


(3.50-5.40) 


 


 





 


Hemoglobin


 10.8 g/dL


(12.0-15.5) 


 


 





 


Hematocrit


 31.7 %


(36.0-47.0) 


 


 





 


Mean Corpuscular Volume 91 fL ()    


 


Mean Corpuscular Hemoglobin 31 pg (25-35)    


 


Mean Corpuscular Hemoglobin


Concent 34 g/dL


(31-37) 


 


 





 


Red Cell Distribution Width


 18.2 %


(11.5-14.5) 


 


 





 


Platelet Count


 237 x10^3/uL


(140-400) 


 


 





 


Neutrophils (%) (Auto) 78 % (31-73)    


 


Lymphocytes (%) (Auto) 13 % (24-48)    


 


Monocytes (%) (Auto) 8 % (0-9)    


 


Eosinophils (%) (Auto) 1 % (0-3)    


 


Basophils (%) (Auto) 0 % (0-3)    


 


Neutrophils # (Auto)


 7.2 x10^3/uL


(1.8-7.7) 


 


 





 


Lymphocytes # (Auto)


 1.2 x10^3/uL


(1.0-4.8) 


 


 





 


Monocytes # (Auto)


 0.7 x10^3/uL


(0.0-1.1) 


 


 





 


Eosinophils # (Auto)


 0.1 x10^3/uL


(0.0-0.7) 


 


 





 


Basophils # (Auto)


 0.0 x10^3/uL


(0.0-0.2) 


 


 











Laboratory Tests








Test


 4/24/20


13:11 4/25/20


00:54 4/25/20


05:37 4/25/20


06:00


 


Glucose (Fingerstick)


 163 mg/dL


(70-99) 121 mg/dL


(70-99) 123 mg/dL


(70-99) 





 


White Blood Count


 


 


 


 9.2 x10^3/uL


(4.0-11.0)


 


Red Blood Count


 


 


 


 3.50 x10^6/uL


(3.50-5.40)


 


Hemoglobin


 


 


 


 10.8 g/dL


(12.0-15.5)


 


Hematocrit


 


 


 


 31.7 %


(36.0-47.0)


 


Mean Corpuscular Volume    91 fL () 


 


Mean Corpuscular Hemoglobin    31 pg (25-35) 


 


Mean Corpuscular Hemoglobin


Concent 


 


 


 34 g/dL


(31-37)


 


Red Cell Distribution Width


 


 


 


 18.2 %


(11.5-14.5)


 


Platelet Count


 


 


 


 237 x10^3/uL


(140-400)


 


Neutrophils (%) (Auto)    78 % (31-73) 


 


Lymphocytes (%) (Auto)    13 % (24-48) 


 


Monocytes (%) (Auto)    8 % (0-9) 


 


Eosinophils (%) (Auto)    1 % (0-3) 


 


Basophils (%) (Auto)    0 % (0-3) 


 


Neutrophils # (Auto)


 


 


 


 7.2 x10^3/uL


(1.8-7.7)


 


Lymphocytes # (Auto)


 


 


 


 1.2 x10^3/uL


(1.0-4.8)


 


Monocytes # (Auto)


 


 


 


 0.7 x10^3/uL


(0.0-1.1)


 


Eosinophils # (Auto)


 


 


 


 0.1 x10^3/uL


(0.0-0.7)


 


Basophils # (Auto)


 


 


 


 0.0 x10^3/uL


(0.0-0.2)








Problem List


Problems


Medical Problems:


(1) Acute pancreatitis


Status: Acute  





(2) Cholelithiasis


Status: Acute  








Assessment/Plan


severe pancreatitis.


plan OR on 4/27











GAMAL ZHOU MD             Apr 25, 2020 11:17

## 2020-04-25 NOTE — NUR
Per phone conversation between JAKUB Jeter and Balta Read RN, TDC is to be pulled today, 
and if new dialysis cath should be needed they will place during surgery on Monday morning.

## 2020-04-25 NOTE — NUR
Pharmacy TPN Dosing Note



S: JESENIA RICH is a 49 year old F Currently receiving Central Continuous TPN started 
03/18/20



B:Pertinent PMH: 

Necrotizing pancreatitis

Height: 5 feet, 8 inches

Weight: 108.9 kg



Current diet: NPO 



LABS:

Sodium:    151 

Potassium: 3.4 

Chloride:  115 

Calcium:   8.9 

Corrected Calcium: 9.94 

Magnesium: 2.1 

CO2:       24 

SCr:       1.3 

Glucose:   121-142 

Albumin:   2.7 

AST:       23 

ALT:       11 



TPN FORMULA:

TPN TYPE:  Central Continuous

AMINO ACIDS:         125 gm

DEXTROSE:            225 gm

LIPIDS:              20 gm

SODIUM CHLORIDE:     - mEq

SODIUM ACETATE:      50 mEq

SODIUM PHOSPHATE:    - mmol

POTASSIUM CHLORIDE:  - mEq

POTASSIUM ACETATE:   55 mEq

POTASSIUM PHOSPHATE: - mmol

MAGNESIUM:           20 mEq

CALCIUM:             10 mEq

INSULIN:             35 units

MULTIPLE VITAMIN:    10 ml

TRACE ELEMENTS:      0.5 ml(s)



TPN PLAN:  

No labs drawn today. No changes to TPN. BMP in AM.





R: Continue TPN AS ABOVE.

Will monitor electrolytes, glucose, and tolerance to TPN.



 CANDACE BELTRE [facility], 04/25/20 0294

## 2020-04-25 NOTE — PDOC
Infectious Disease Note


Subjective


Subjective


Ongoing fever,101.0


Not feeling well, abd pain present


Remains on vent via trach,


TPN 


cont to be febrile





ROS


ROS


unobtainable





Vital Sign


Vital Signs





Vital Signs








  Date Time  Temp Pulse Resp B/P (MAP) Pulse Ox O2 Delivery O2 Flow Rate FiO2


 


4/25/20 11:31     97 Ventilator  


 


4/25/20 11:20   27     


 


4/25/20 11:00  100  169/96 (120)    


 


4/25/20 08:00 101.0       





 101.0       


 


4/24/20 14:05       6.0 











Physical Exam


PHYSICAL EXAM


GENERAL: Propped up in bed, weak appearing 


HEENT: Pupils equal, + NGT, oral cavity dry 


NECK:  Trach/vent 


LUNGS: rhonchi 


HEART:  S1, S2, regular 


ABDOMEN: Distended, tender, hypoactive BS, 


: [place] (4/14)


EXTREMITIES: Generalized edema, no cyanosis, SCDs bilaterally 


DERMATOLOGIC:  Warm and dry.  No generalized rash.  


CENTRAL NERVOUS SYSTEM: Extremely weak trying to talk but unable to understand


HDC & LIJ (4/14) clean





Labs


Lab





Laboratory Tests








Test


 4/24/20


13:11 4/25/20


00:54 4/25/20


05:37 4/25/20


06:00


 


Glucose (Fingerstick)


 163 mg/dL


(70-99) 121 mg/dL


(70-99) 123 mg/dL


(70-99) 





 


White Blood Count


 


 


 


 9.2 x10^3/uL


(4.0-11.0)


 


Red Blood Count


 


 


 


 3.50 x10^6/uL


(3.50-5.40)


 


Hemoglobin


 


 


 


 10.8 g/dL


(12.0-15.5)


 


Hematocrit


 


 


 


 31.7 %


(36.0-47.0)


 


Mean Corpuscular Volume    91 fL () 


 


Mean Corpuscular Hemoglobin    31 pg (25-35) 


 


Mean Corpuscular Hemoglobin


Concent 


 


 


 34 g/dL


(31-37)


 


Red Cell Distribution Width


 


 


 


 18.2 %


(11.5-14.5)


 


Platelet Count


 


 


 


 237 x10^3/uL


(140-400)


 


Neutrophils (%) (Auto)    78 % (31-73) 


 


Lymphocytes (%) (Auto)    13 % (24-48) 


 


Monocytes (%) (Auto)    8 % (0-9) 


 


Eosinophils (%) (Auto)    1 % (0-3) 


 


Basophils (%) (Auto)    0 % (0-3) 


 


Neutrophils # (Auto)


 


 


 


 7.2 x10^3/uL


(1.8-7.7)


 


Lymphocytes # (Auto)


 


 


 


 1.2 x10^3/uL


(1.0-4.8)


 


Monocytes # (Auto)


 


 


 


 0.7 x10^3/uL


(0.0-1.1)


 


Eosinophils # (Auto)


 


 


 


 0.1 x10^3/uL


(0.0-0.7)


 


Basophils # (Auto)


 


 


 


 0.0 x10^3/uL


(0.0-0.2)


 


Test


 4/25/20


12:02 


 


 





 


Glucose (Fingerstick)


 142 mg/dL


(70-99) 


 


 











Micro


CT A/P, 4/22


IMPRESSION:


1. Pancreas again appears diffusely enlarged and hypodense compatible with


diffuse pancreatitis and pancreatic necrosis.


2. Extensive retroperitoneal fluid collections are again identified, 


slightly larger in size.


3. Moderate to large volume of abdominal and pelvic ascites is again 


identified. Mild generalized increase in fatty inflammation or anasarca.


4. Cholelithiasis with possible cholecystitis.


5. Moderate pleural effusions, may be slightly improved. 


Infiltrate/atelectasis in both lung bases.


6. Small hepatic low-density lesion is too small to characterize, may 


represent a small cyst, likely unchanged.





Objective


Assessment


Leukocytosis 4/10 -improved 


Fever


   - New left IJ/[place]ino 4/14. PICC removed 4/14


   -? pancreatitis. blood cults 4/10 neg. Urine - neg, 


Ascites s/p paracentesis 4/15 - 4300 ml. cultures neg 


Severe acute gallstone pancreatitis with necrosis


   -CT 4/14 necrotizing pancreatitis with fluid and phlegmon at the pancreas 


Hypotension off levaphed 


Julian Pleural effusions


JED requiring HD 


   - s/p RIJ temporary dialysis catheter replacement, 4/2


Vent dependent respiratory failure


   -s/p Trach placement 


   -lung opacities, COVID-19 neg 


Anasarca - worse


Anemia - S/p PRBC 3/26


Hypocalcemia 


Prediabetes


HTN


Diarrhea, C. diff neg 3/23


Anemia - S/p PRBCs





Plan


Plan of Care


cont merrem (4/8), micafungin, and dapto 


zyvox changed to dapto (4/22), to rule out serotonin sy causing fever


Maintain aspiration precautions 


Removal of HDC underway 


Surgery on Monday, 4/27 planned 





D/w nursing





Critically ill


Attending Co-Sign


The patient was seen and interviewed as well as examined at the bedside. The 

chart was reviewed. The case was discussed. Agree with the plan of care.











HAVEN WINTERS        Apr 25, 2020 12:24


LINN FRANZ MD               Apr 25, 2020 12:34

## 2020-04-26 VITALS — SYSTOLIC BLOOD PRESSURE: 154 MMHG | DIASTOLIC BLOOD PRESSURE: 79 MMHG

## 2020-04-26 VITALS — SYSTOLIC BLOOD PRESSURE: 128 MMHG | DIASTOLIC BLOOD PRESSURE: 68 MMHG

## 2020-04-26 VITALS — DIASTOLIC BLOOD PRESSURE: 84 MMHG | SYSTOLIC BLOOD PRESSURE: 148 MMHG

## 2020-04-26 VITALS — DIASTOLIC BLOOD PRESSURE: 71 MMHG | SYSTOLIC BLOOD PRESSURE: 140 MMHG

## 2020-04-26 VITALS — SYSTOLIC BLOOD PRESSURE: 111 MMHG | DIASTOLIC BLOOD PRESSURE: 57 MMHG

## 2020-04-26 VITALS — DIASTOLIC BLOOD PRESSURE: 69 MMHG | SYSTOLIC BLOOD PRESSURE: 117 MMHG

## 2020-04-26 VITALS — DIASTOLIC BLOOD PRESSURE: 59 MMHG | SYSTOLIC BLOOD PRESSURE: 106 MMHG

## 2020-04-26 VITALS — SYSTOLIC BLOOD PRESSURE: 162 MMHG | DIASTOLIC BLOOD PRESSURE: 93 MMHG

## 2020-04-26 VITALS — DIASTOLIC BLOOD PRESSURE: 83 MMHG | SYSTOLIC BLOOD PRESSURE: 140 MMHG

## 2020-04-26 VITALS — DIASTOLIC BLOOD PRESSURE: 64 MMHG | SYSTOLIC BLOOD PRESSURE: 99 MMHG

## 2020-04-26 VITALS — SYSTOLIC BLOOD PRESSURE: 123 MMHG | DIASTOLIC BLOOD PRESSURE: 62 MMHG

## 2020-04-26 VITALS — SYSTOLIC BLOOD PRESSURE: 119 MMHG | DIASTOLIC BLOOD PRESSURE: 79 MMHG

## 2020-04-26 VITALS — DIASTOLIC BLOOD PRESSURE: 92 MMHG | SYSTOLIC BLOOD PRESSURE: 168 MMHG

## 2020-04-26 VITALS — DIASTOLIC BLOOD PRESSURE: 62 MMHG | SYSTOLIC BLOOD PRESSURE: 116 MMHG

## 2020-04-26 VITALS — SYSTOLIC BLOOD PRESSURE: 131 MMHG | DIASTOLIC BLOOD PRESSURE: 63 MMHG

## 2020-04-26 VITALS — SYSTOLIC BLOOD PRESSURE: 168 MMHG | DIASTOLIC BLOOD PRESSURE: 91 MMHG

## 2020-04-26 VITALS — SYSTOLIC BLOOD PRESSURE: 140 MMHG | DIASTOLIC BLOOD PRESSURE: 74 MMHG

## 2020-04-26 VITALS — SYSTOLIC BLOOD PRESSURE: 118 MMHG | DIASTOLIC BLOOD PRESSURE: 70 MMHG

## 2020-04-26 VITALS — DIASTOLIC BLOOD PRESSURE: 67 MMHG | SYSTOLIC BLOOD PRESSURE: 122 MMHG

## 2020-04-26 VITALS — SYSTOLIC BLOOD PRESSURE: 102 MMHG | DIASTOLIC BLOOD PRESSURE: 58 MMHG

## 2020-04-26 VITALS — SYSTOLIC BLOOD PRESSURE: 118 MMHG | DIASTOLIC BLOOD PRESSURE: 62 MMHG

## 2020-04-26 VITALS — DIASTOLIC BLOOD PRESSURE: 69 MMHG | SYSTOLIC BLOOD PRESSURE: 120 MMHG

## 2020-04-26 VITALS — SYSTOLIC BLOOD PRESSURE: 137 MMHG | DIASTOLIC BLOOD PRESSURE: 64 MMHG

## 2020-04-26 VITALS — DIASTOLIC BLOOD PRESSURE: 83 MMHG | SYSTOLIC BLOOD PRESSURE: 137 MMHG

## 2020-04-26 LAB
ANION GAP SERPL CALC-SCNC: 11 MMOL/L (ref 6–14)
BASOPHILS # BLD AUTO: 0 X10^3/UL (ref 0–0.2)
BASOPHILS NFR BLD: 0 % (ref 0–3)
BUN SERPL-MCNC: 60 MG/DL (ref 7–20)
CALCIUM SERPL-MCNC: 8.6 MG/DL (ref 8.5–10.1)
CHLORIDE SERPL-SCNC: 110 MMOL/L (ref 98–107)
CO2 SERPL-SCNC: 28 MMOL/L (ref 21–32)
CREAT SERPL-MCNC: 1 MG/DL (ref 0.6–1)
EOSINOPHIL NFR BLD: 0.1 X10^3/UL (ref 0–0.7)
EOSINOPHIL NFR BLD: 1 % (ref 0–3)
ERYTHROCYTE [DISTWIDTH] IN BLOOD BY AUTOMATED COUNT: 18.3 % (ref 11.5–14.5)
GFR SERPLBLD BASED ON 1.73 SQ M-ARVRAT: 58.9 ML/MIN
GLUCOSE SERPL-MCNC: 142 MG/DL (ref 70–99)
HCT VFR BLD CALC: 23.6 % (ref 36–47)
HGB BLD-MCNC: 7.7 G/DL (ref 12–15.5)
LYMPHOCYTES # BLD: 1.2 X10^3/UL (ref 1–4.8)
LYMPHOCYTES NFR BLD AUTO: 14 % (ref 24–48)
MCH RBC QN AUTO: 30 PG (ref 25–35)
MCHC RBC AUTO-ENTMCNC: 33 G/DL (ref 31–37)
MCV RBC AUTO: 91 FL (ref 79–100)
MONO #: 0.6 X10^3/UL (ref 0–1.1)
MONOCYTES NFR BLD: 6 % (ref 0–9)
NEUT #: 7.4 X10^3/UL (ref 1.8–7.7)
NEUTROPHILS NFR BLD AUTO: 79 % (ref 31–73)
PLATELET # BLD AUTO: 286 X10^3/UL (ref 140–400)
POTASSIUM SERPL-SCNC: 3.5 MMOL/L (ref 3.5–5.1)
RBC # BLD AUTO: 2.6 X10^6/UL (ref 3.5–5.4)
SODIUM SERPL-SCNC: 149 MMOL/L (ref 136–145)
WBC # BLD AUTO: 9.3 X10^3/UL (ref 4–11)

## 2020-04-26 PROCEDURE — 5A1955Z RESPIRATORY VENTILATION, GREATER THAN 96 CONSECUTIVE HOURS: ICD-10-PCS | Performed by: SURGERY

## 2020-04-26 RX ADMIN — DEXMEDETOMIDINE HYDROCHLORIDE PRN MLS/HR: 100 INJECTION, SOLUTION, CONCENTRATE INTRAVENOUS at 15:06

## 2020-04-26 RX ADMIN — DEXMEDETOMIDINE HYDROCHLORIDE PRN MLS/HR: 100 INJECTION, SOLUTION, CONCENTRATE INTRAVENOUS at 08:58

## 2020-04-26 RX ADMIN — HEPARIN SODIUM SCH UNIT: 5000 INJECTION, SOLUTION INTRAVENOUS; SUBCUTANEOUS at 08:59

## 2020-04-26 RX ADMIN — PANTOPRAZOLE SODIUM SCH MG: 40 INJECTION, POWDER, FOR SOLUTION INTRAVENOUS at 07:24

## 2020-04-26 RX ADMIN — DEXMEDETOMIDINE HYDROCHLORIDE PRN MLS/HR: 100 INJECTION, SOLUTION, CONCENTRATE INTRAVENOUS at 22:10

## 2020-04-26 RX ADMIN — DEXMEDETOMIDINE HYDROCHLORIDE PRN MLS/HR: 100 INJECTION, SOLUTION, CONCENTRATE INTRAVENOUS at 02:43

## 2020-04-26 RX ADMIN — DAPTOMYCIN SCH MLS/HR: 500 INJECTION, POWDER, LYOPHILIZED, FOR SOLUTION INTRAVENOUS at 14:13

## 2020-04-26 RX ADMIN — FENTANYL CITRATE PRN MCG: 50 INJECTION INTRAMUSCULAR; INTRAVENOUS at 13:56

## 2020-04-26 RX ADMIN — MEROPENEM SCH MLS/HR: 500 INJECTION, POWDER, FOR SOLUTION INTRAVENOUS at 22:11

## 2020-04-26 RX ADMIN — Medication PRN EACH: at 13:34

## 2020-04-26 RX ADMIN — DEXMEDETOMIDINE HYDROCHLORIDE PRN MLS/HR: 100 INJECTION, SOLUTION, CONCENTRATE INTRAVENOUS at 12:27

## 2020-04-26 RX ADMIN — INSULIN LISPRO SCH UNITS: 100 INJECTION, SOLUTION INTRAVENOUS; SUBCUTANEOUS at 00:00

## 2020-04-26 RX ADMIN — FENTANYL CITRATE PRN MCG: 50 INJECTION INTRAMUSCULAR; INTRAVENOUS at 10:17

## 2020-04-26 RX ADMIN — INSULIN LISPRO SCH UNITS: 100 INJECTION, SOLUTION INTRAVENOUS; SUBCUTANEOUS at 12:00

## 2020-04-26 RX ADMIN — MEROPENEM SCH MLS/HR: 500 INJECTION, POWDER, FOR SOLUTION INTRAVENOUS at 09:54

## 2020-04-26 RX ADMIN — FENTANYL CITRATE PRN MCG: 50 INJECTION INTRAMUSCULAR; INTRAVENOUS at 16:29

## 2020-04-26 RX ADMIN — DEXMEDETOMIDINE HYDROCHLORIDE PRN MLS/HR: 100 INJECTION, SOLUTION, CONCENTRATE INTRAVENOUS at 00:29

## 2020-04-26 RX ADMIN — DEXTROSE SCH MLS/HR: 50 INJECTION, SOLUTION INTRAVENOUS at 08:58

## 2020-04-26 RX ADMIN — DEXMEDETOMIDINE HYDROCHLORIDE PRN MLS/HR: 100 INJECTION, SOLUTION, CONCENTRATE INTRAVENOUS at 05:37

## 2020-04-26 RX ADMIN — INSULIN LISPRO SCH UNITS: 100 INJECTION, SOLUTION INTRAVENOUS; SUBCUTANEOUS at 17:29

## 2020-04-26 RX ADMIN — FENTANYL CITRATE PRN MCG: 50 INJECTION INTRAMUSCULAR; INTRAVENOUS at 07:51

## 2020-04-26 RX ADMIN — FENTANYL CITRATE PRN MCG: 50 INJECTION INTRAMUSCULAR; INTRAVENOUS at 22:49

## 2020-04-26 RX ADMIN — DEXMEDETOMIDINE HYDROCHLORIDE PRN MLS/HR: 100 INJECTION, SOLUTION, CONCENTRATE INTRAVENOUS at 18:32

## 2020-04-26 RX ADMIN — INSULIN LISPRO SCH UNITS: 100 INJECTION, SOLUTION INTRAVENOUS; SUBCUTANEOUS at 05:47

## 2020-04-26 NOTE — PDOC
PROGRESS NOTES


Assessment


Assessment


Respiratory failure.


Seizure.


Metabolic encephalopathy.


Fever, recurrent, T100.5 degree on 4/26/20.


Metabolic acidosis.


Diffuse pulmonary infiltrate.


Pleural effusion.


Pancreatitis, necrotizing.


Gallstone.


Leukocytosis.


Lymphopenia.


Electrolytes imbalances.


Hyperglycemia.


DM.


HTN.


HLD.


Anemia.


Abnormal CXR.


Obesity.





RECOMMENDATIONS/PLAN:


Continue life support in CCU at the present time.


Keppra if has further seizures.


Treat medical diseases.


OT/PT.





EEG: No seizure activity.





OBJECTIVE:


4/26/20: No seizures reported over night.





Past Medical History


Cardiovascular:  HTN, Hyperlipidemia


Endocrine:  Diabetes





Family History


Unobtainable.





Social HistoryU


not obtainable





Allergies


Coded Allergies:  


Codeine (Verified  Allergy, Intermediate, rash, 3/16/20)





ROS


Unobtainable.





NEUROLOGICAL  EXAMINATION:


Sleepiness but arousable.


Not fully oriented to time, place and person.


PERRL.


EOMI not examined.


CN: no acute focal findings.


Muscle tone: Decreased.


Muscle strength: 2-3 UE, 2 LE distal.


DTR: 1-2


Plantar reflex: Neutral response bilaterally 


Gait: not able to walk.


Sensory exam: no response to stimuli..


Not able to access cerebellar signs.


F-T-N test not performed. 


Patient seen in CCU.





Objective


Objective





Vital Signs








  Date Time  Temp Pulse Resp B/P (MAP) Pulse Ox O2 Delivery O2 Flow Rate FiO2


 


4/26/20 15:40     99 Ventilator  


 


4/26/20 15:00  93 32 162/93 (116)    


 


4/26/20 12:00 100.3       





 100.3       














Intake and Output 


 


 4/26/20





 07:00


 


Intake Total 2914 ml


 


Output Total 1995 ml


 


Balance 919 ml


 


 


 


Intake Oral 0 ml


 


IV Total 2914 ml


 


Output Urine Total 1995 ml











Vitals Signs


Vitals





                          VS - Last 72 Hours, by Label








  Date Time  Temp Pulse Resp B/P (MAP) Pulse Ox O2 Delivery O2 Flow Rate FiO2


 


4/26/20 15:40     99 Ventilator  


 


4/26/20 15:00  93 32 162/93 (116) 99 Ventilator  


 


4/26/20 14:00  99 37 140/83 (102) 100 Ventilator  


 


4/26/20 13:56   27  100 Ventilator  


 


4/26/20 13:36     99 Ventilator  


 


4/26/20 13:00  80 25 117/69 (85) 99 Ventilator  


 


4/26/20 12:00      Mechanical Ventilator  


 


4/26/20 12:00 100.3 90 36 128/68 (88) 99 Ventilator  





 100.3       


 


4/26/20 11:41     98 Ventilator  


 


4/26/20 11:00  84 33 140/71 (94) 99 Ventilator  


 


4/26/20 10:17   26  99 Ventilator  


 


4/26/20 10:00  81 29 137/83 (101) 99 Ventilator  


 


4/26/20 09:00  79 21 122/67 (85) 98 Ventilator  


 


4/26/20 09:00      Ventilator  


 


4/26/20 08:21   18  99 Ventilator  


 


4/26/20 08:00 100.5 76 18 123/62 (82) 99 Ventilator  





 100.5       


 


4/26/20 08:00      Mechanical Ventilator  


 


4/26/20 07:51   34  100 Ventilator  


 


4/26/20 07:35     98 Ventilator  


 


4/26/20 07:00  75 25 118/62 (80) 98 Ventilator  


 


4/26/20 06:00  76 20 118/70 (86) 100 Ventilator  


 


4/26/20 05:00  76 20 99/64 (76) 98 Ventilator  


 


4/26/20 04:15     100 Ventilator  


 


4/26/20 04:00 99.5 83 24 137/64 (88) 100 Ventilator  





 99.5       


 


4/26/20 04:00      Mechanical Ventilator  


 


4/26/20 03:00  75 21 116/62 (80) 98 Ventilator  


 


4/26/20 02:00  75 21 106/59 (75) 97 Ventilator  


 


4/26/20 01:00  76 20 102/58 (73) 98 Ventilator  


 


4/26/20 00:26     98 Ventilator  


 


4/26/20 00:00 100.9 81 20 154/79 (104) 98 Ventilator  





 100.9       


 


4/26/20 00:00      Mechanical Ventilator  


 


4/25/20 23:00  86 18 144/71 (95) 100 Ventilator  


 


4/25/20 22:51   18  100 Ventilator  


 


4/25/20 22:21   18  100 Ventilator  


 


4/25/20 22:00  84 22 124/72 (89) 98 Ventilator  


 


4/25/20 21:00  79 19 119/66 (83) 98 Ventilator  


 


4/25/20 20:25     98 Ventilator  


 


4/25/20 20:25   18  98 Ventilator  


 


4/25/20 20:00 99.9 87 28 133/80 (97) 99 Ventilator  





 99.9       


 


4/25/20 19:55   18  99 Ventilator  


 


4/25/20 19:45      Mechanical Ventilator  


 


4/25/20 19:00  87 25 129/81 (97) 100 CPAP Trial  


 


4/25/20 18:00  101 28 144/79 (100) 98 CPAP Trial  


 


4/25/20 17:00  85 24 146/73 (97) 96 CPAP Trial  


 


4/25/20 16:12   22  98 Ventilator  


 


4/25/20 16:00      Mechanical Ventilator  


 


4/25/20 16:00 99.3 82 22 149/77 (101) 98 CPAP Trial  





 99.3       


 


4/25/20 15:53     100 Ventilator  


 


4/25/20 15:42   32  99 Ventilator  


 


4/25/20 15:00  95 22 145/78 (100) 100 CPAP Trial  


 


4/25/20 14:00  85 21 124/66 (85) 98 CPAP Trial  


 


4/25/20 13:00  93 25 156/68 (97) 99 CPAP Trial  


 


4/25/20 12:00      Mechanical Ventilator  


 


4/25/20 12:00 101.3 124 34 196/90 (125) 97 CPAP Trial  





 101.3       


 


4/25/20 11:31     97 Ventilator  


 


4/25/20 11:20   27  98 Ventilator  


 


4/25/20 11:00  100 29 169/96 (120) 97 Ventilator  


 


4/25/20 10:47   30  99 Ventilator  


 


4/25/20 10:00  78 27 112/72 (85) 98 Ventilator  


 


4/25/20 09:00  79 26 108/65 (79) 98 Ventilator  


 


4/25/20 08:00 101.0 82 28 109/67 (81) 97 Ventilator  





 101.0       


 


4/25/20 08:00      Mechanical Ventilator  


 


4/25/20 07:52     98 Ventilator  


 


4/25/20 07:00  81 28 104/55 (71) 97 Ventilator  











Laboratory


Laboratory





Laboratory Tests








Test


 4/25/20


17:54 4/26/20


00:25 4/26/20


05:45 4/26/20


06:00


 


Glucose (Fingerstick)


 128 mg/dL


(70-99) 129 mg/dL


(70-99) 137 mg/dL


(70-99) 





 


White Blood Count


 


 


 


 9.3 x10^3/uL


(4.0-11.0)


 


Red Blood Count


 


 


 


 2.60 x10^6/uL


(3.50-5.40)


 


Hemoglobin


 


 


 


 7.7 g/dL


(12.0-15.5)


 


Hematocrit


 


 


 


 23.6 %


(36.0-47.0)


 


Mean Corpuscular Volume    91 fL () 


 


Mean Corpuscular Hemoglobin    30 pg (25-35) 


 


Mean Corpuscular Hemoglobin


Concent 


 


 


 33 g/dL


(31-37)


 


Red Cell Distribution Width


 


 


 


 18.3 %


(11.5-14.5)


 


Platelet Count


 


 


 


 286 x10^3/uL


(140-400)


 


Neutrophils (%) (Auto)    79 % (31-73) 


 


Lymphocytes (%) (Auto)    14 % (24-48) 


 


Monocytes (%) (Auto)    6 % (0-9) 


 


Eosinophils (%) (Auto)    1 % (0-3) 


 


Basophils (%) (Auto)    0 % (0-3) 


 


Neutrophils # (Auto)


 


 


 


 7.4 x10^3/uL


(1.8-7.7)


 


Lymphocytes # (Auto)


 


 


 


 1.2 x10^3/uL


(1.0-4.8)


 


Monocytes # (Auto)


 


 


 


 0.6 x10^3/uL


(0.0-1.1)


 


Eosinophils # (Auto)


 


 


 


 0.1 x10^3/uL


(0.0-0.7)


 


Basophils # (Auto)


 


 


 


 0.0 x10^3/uL


(0.0-0.2)


 


Sodium Level


 


 


 


 149 mmol/L


(136-145)


 


Potassium Level


 


 


 


 3.5 mmol/L


(3.5-5.1)


 


Chloride Level


 


 


 


 110 mmol/L


()


 


Carbon Dioxide Level


 


 


 


 28 mmol/L


(21-32)


 


Anion Gap    11 (6-14) 


 


Blood Urea Nitrogen


 


 


 


 60 mg/dL


(7-20)


 


Creatinine


 


 


 


 1.0 mg/dL


(0.6-1.0)


 


Estimated GFR


(Cockcroft-Gault) 


 


 


 58.9 





 


Glucose Level


 


 


 


 142 mg/dL


(70-99)


 


Calcium Level


 


 


 


 8.6 mg/dL


(8.5-10.1)


 


Test


 4/26/20


12:09 


 


 





 


Glucose (Fingerstick)


 148 mg/dL


(70-99) 


 


 











Microbiology


4/15/20 Aerobic and Anaerobic Culture - Final, Complete


          


4/15/20 Anaerobic Culture Result 1 (ANSON) - Final, Complete


          


4/15/20 Aerobic Culture - Final, Complete


          


4/15/20 Aerobic Culture Result 1 (ANSON) - Final, Complete


          


4/15/20 Gram Stain - Final, Complete


          


4/15/20 Gram Stain Result 1 (ANSON) - Final, Complete


          


4/15/20 Gram Stain Result 2 (ANSON) - Final, Complete


          


4/14/20 Blood Culture - Final, Complete


          NO GROWTH AFTER 5 DAYS


4/12/20 Urine Culture - Final, Complete


          


4/12/20 Urine Culture Result 1 (ANSON) - Final, Complete





Medication


Medications





Current Medications


Fentanyl Citrate (Fentanyl 2ml Vial) 25 mcg PRN Q5MIN  PRN IV MILD PAIN 1-3;  

Start 4/27/20 at 07:00;  Stop 4/28/20 at 06:59


Fentanyl Citrate (Fentanyl 2ml Vial) 50 mcg PRN Q5MIN  PRN IV MODERATE TO SEVERE

PAIN;  Start 4/27/20 at 07:00;  Stop 4/28/20 at 06:59


Heparin Sodium (Porcine) 1000 unit/Sodium Chloride 1,001 ml @  1,001 mls/hr 1X  

ONCE IRR ;  Start 4/27/20 at 06:00;  Stop 4/27/20 at 06:59


Lidocaine HCl (Xylocaine-Mpf 1% 2ml Vial) 2 ml PRN 1X  PRN ID PRIOR TO IV START;

 Start 4/27/20 at 07:00;  Stop 4/28/20 at 06:59


Ondansetron HCl (Zofran) 4 mg PRN Q6HRS  PRN IV NAUSEA/VOMITING;  Start 4/27/20 

at 07:00;  Stop 4/28/20 at 06:59


Potassium Acetate 55 meq/Magnesium Sulfate 20 meq/ Calcium Gluconate 10 meq/ 

Multivitamins 10 ml/Chromium/ Copper/Manganese/ Seleni/Zn 0.5 ml/ Insulin Human 

Regular 35 unit/ Total Parenteral Nutrition/Amino Acids/Dextrose/ Fat Emulsion 

Intravenous 1,920 ml @  80 mls/hr TPN  CONT IV ;  Start 4/26/20 at 22:00;  Stop 

4/27/20 at 21:59


Prochlorperazine Edisylate (Compazine) 5 mg PACU PRN  PRN IV NAUSEA, MRX1;  

Start 4/27/20 at 07:00;  Stop 4/28/20 at 06:59


Ringer's Solution 1,000 ml @  30 mls/hr Q24H IV ;  Start 4/27/20 at 07:00;  Stop

4/27/20 at 18:59


Sodium Acetate 50 meq/Potassium Acetate 55 meq/ Magnesium Sulfate 20 meq/Calcium

Gluconate 10 meq/ Multivitamins 10 ml/Chromium/ Copper/Manganese/ Seleni/Zn 0.5 

ml/ Insulin Human Regular 35 unit/ Total Parenteral Nutrition/Amino 

Acids/Dextrose/ Fat Emulsion Intravenous 1,800 ml @  75 mls/hr TPN  CONT IV  

Last administered on 4/25/20at 22:03;  Start 4/25/20 at 22:00;  Stop 4/26/20 at 

21:59





Comment


Review of Relevant


I have reviewed the following items josy (where applicable) has been applied.











DORYS LANDA MD                 Apr 26, 2020 16:19

## 2020-04-26 NOTE — PDOC
SURGICAL PROGRESS NOTE


Subjective


Pt awake on vent


Vital Signs





Vital Signs








  Date Time  Temp Pulse Resp B/P (MAP) Pulse Ox O2 Delivery O2 Flow Rate FiO2


 


4/26/20 12:00 100.3 90 36 128/68 (88) 99 Ventilator  





 100.3       








I&O











Intake and Output 


 


 4/26/20





 07:00


 


Intake Total 2914 ml


 


Output Total 1995 ml


 


Balance 919 ml


 


 


 


Intake Oral 0 ml


 


IV Total 2914 ml


 


Output Urine Total 1995 ml








General:  Alert, Cooperative, No acute distress


Abdomen:  Soft, Other (mild TTP)


Labs





Laboratory Tests








Test


 4/25/20


00:54 4/25/20


05:37 4/25/20


06:00 4/25/20


12:02


 


Glucose (Fingerstick)


 121 mg/dL


(70-99) 123 mg/dL


(70-99) 


 142 mg/dL


(70-99)


 


White Blood Count


 


 


 9.2 x10^3/uL


(4.0-11.0) 





 


Red Blood Count


 


 


 3.50 x10^6/uL


(3.50-5.40) 





 


Hemoglobin


 


 


 10.8 g/dL


(12.0-15.5) 





 


Hematocrit


 


 


 31.7 %


(36.0-47.0) 





 


Mean Corpuscular Volume   91 fL ()  


 


Mean Corpuscular Hemoglobin   31 pg (25-35)  


 


Mean Corpuscular Hemoglobin


Concent 


 


 34 g/dL


(31-37) 





 


Red Cell Distribution Width


 


 


 18.2 %


(11.5-14.5) 





 


Platelet Count


 


 


 237 x10^3/uL


(140-400) 





 


Neutrophils (%) (Auto)   78 % (31-73)  


 


Lymphocytes (%) (Auto)   13 % (24-48)  


 


Monocytes (%) (Auto)   8 % (0-9)  


 


Eosinophils (%) (Auto)   1 % (0-3)  


 


Basophils (%) (Auto)   0 % (0-3)  


 


Neutrophils # (Auto)


 


 


 7.2 x10^3/uL


(1.8-7.7) 





 


Lymphocytes # (Auto)


 


 


 1.2 x10^3/uL


(1.0-4.8) 





 


Monocytes # (Auto)


 


 


 0.7 x10^3/uL


(0.0-1.1) 





 


Eosinophils # (Auto)


 


 


 0.1 x10^3/uL


(0.0-0.7) 





 


Basophils # (Auto)


 


 


 0.0 x10^3/uL


(0.0-0.2) 





 


Test


 4/25/20


17:54 4/26/20


00:25 4/26/20


05:45 4/26/20


06:00


 


Glucose (Fingerstick)


 128 mg/dL


(70-99) 129 mg/dL


(70-99) 137 mg/dL


(70-99) 





 


White Blood Count


 


 


 


 9.3 x10^3/uL


(4.0-11.0)


 


Red Blood Count


 


 


 


 2.60 x10^6/uL


(3.50-5.40)


 


Hemoglobin


 


 


 


 7.7 g/dL


(12.0-15.5)


 


Hematocrit


 


 


 


 23.6 %


(36.0-47.0)


 


Mean Corpuscular Volume    91 fL () 


 


Mean Corpuscular Hemoglobin    30 pg (25-35) 


 


Mean Corpuscular Hemoglobin


Concent 


 


 


 33 g/dL


(31-37)


 


Red Cell Distribution Width


 


 


 


 18.3 %


(11.5-14.5)


 


Platelet Count


 


 


 


 286 x10^3/uL


(140-400)


 


Neutrophils (%) (Auto)    79 % (31-73) 


 


Lymphocytes (%) (Auto)    14 % (24-48) 


 


Monocytes (%) (Auto)    6 % (0-9) 


 


Eosinophils (%) (Auto)    1 % (0-3) 


 


Basophils (%) (Auto)    0 % (0-3) 


 


Neutrophils # (Auto)


 


 


 


 7.4 x10^3/uL


(1.8-7.7)


 


Lymphocytes # (Auto)


 


 


 


 1.2 x10^3/uL


(1.0-4.8)


 


Monocytes # (Auto)


 


 


 


 0.6 x10^3/uL


(0.0-1.1)


 


Eosinophils # (Auto)


 


 


 


 0.1 x10^3/uL


(0.0-0.7)


 


Basophils # (Auto)


 


 


 


 0.0 x10^3/uL


(0.0-0.2)


 


Sodium Level


 


 


 


 149 mmol/L


(136-145)


 


Potassium Level


 


 


 


 3.5 mmol/L


(3.5-5.1)


 


Chloride Level


 


 


 


 110 mmol/L


()


 


Carbon Dioxide Level


 


 


 


 28 mmol/L


(21-32)


 


Anion Gap    11 (6-14) 


 


Blood Urea Nitrogen


 


 


 


 60 mg/dL


(7-20)


 


Creatinine


 


 


 


 1.0 mg/dL


(0.6-1.0)


 


Estimated GFR


(Cockcroft-Gault) 


 


 


 58.9 





 


Glucose Level


 


 


 


 142 mg/dL


(70-99)


 


Calcium Level


 


 


 


 8.6 mg/dL


(8.5-10.1)


 


Test


 4/26/20


12:09 


 


 





 


Glucose (Fingerstick)


 148 mg/dL


(70-99) 


 


 











Laboratory Tests








Test


 4/25/20


17:54 4/26/20


00:25 4/26/20


05:45 4/26/20


06:00


 


Glucose (Fingerstick)


 128 mg/dL


(70-99) 129 mg/dL


(70-99) 137 mg/dL


(70-99) 





 


White Blood Count


 


 


 


 9.3 x10^3/uL


(4.0-11.0)


 


Red Blood Count


 


 


 


 2.60 x10^6/uL


(3.50-5.40)


 


Hemoglobin


 


 


 


 7.7 g/dL


(12.0-15.5)


 


Hematocrit


 


 


 


 23.6 %


(36.0-47.0)


 


Mean Corpuscular Volume    91 fL () 


 


Mean Corpuscular Hemoglobin    30 pg (25-35) 


 


Mean Corpuscular Hemoglobin


Concent 


 


 


 33 g/dL


(31-37)


 


Red Cell Distribution Width


 


 


 


 18.3 %


(11.5-14.5)


 


Platelet Count


 


 


 


 286 x10^3/uL


(140-400)


 


Neutrophils (%) (Auto)    79 % (31-73) 


 


Lymphocytes (%) (Auto)    14 % (24-48) 


 


Monocytes (%) (Auto)    6 % (0-9) 


 


Eosinophils (%) (Auto)    1 % (0-3) 


 


Basophils (%) (Auto)    0 % (0-3) 


 


Neutrophils # (Auto)


 


 


 


 7.4 x10^3/uL


(1.8-7.7)


 


Lymphocytes # (Auto)


 


 


 


 1.2 x10^3/uL


(1.0-4.8)


 


Monocytes # (Auto)


 


 


 


 0.6 x10^3/uL


(0.0-1.1)


 


Eosinophils # (Auto)


 


 


 


 0.1 x10^3/uL


(0.0-0.7)


 


Basophils # (Auto)


 


 


 


 0.0 x10^3/uL


(0.0-0.2)


 


Sodium Level


 


 


 


 149 mmol/L


(136-145)


 


Potassium Level


 


 


 


 3.5 mmol/L


(3.5-5.1)


 


Chloride Level


 


 


 


 110 mmol/L


()


 


Carbon Dioxide Level


 


 


 


 28 mmol/L


(21-32)


 


Anion Gap    11 (6-14) 


 


Blood Urea Nitrogen


 


 


 


 60 mg/dL


(7-20)


 


Creatinine


 


 


 


 1.0 mg/dL


(0.6-1.0)


 


Estimated GFR


(Cockcroft-Gault) 


 


 


 58.9 





 


Glucose Level


 


 


 


 142 mg/dL


(70-99)


 


Calcium Level


 


 


 


 8.6 mg/dL


(8.5-10.1)


 


Test


 4/26/20


12:09 


 


 





 


Glucose (Fingerstick)


 148 mg/dL


(70-99) 


 


 











Problem List


Problems


Medical Problems:


(1) Acute pancreatitis


Status: Acute  





(2) Cholelithiasis


Status: Acute  








Assessment/Plan


severe pancreatitis


TO OR in AM for laparoscopic versus open cholecystectomy with cholangiogram, 

pancreatic necrosectomy











GAMAL ZHOU MD             Apr 26, 2020 13:14

## 2020-04-26 NOTE — PDOC
Infectious Disease Note


Subjective


Subjective


+ abdominal pain 


Ongoing fevers


Remains on vent via trach, FiO2 30% 


TPN





Vital Sign


Vital Signs





Vital Signs








  Date Time  Temp Pulse Resp B/P (MAP) Pulse Ox O2 Delivery O2 Flow Rate FiO2


 


4/26/20 08:00 100.5 76 18 123/62 (82) 99 Ventilator  





 100.5       











Physical Exam


PHYSICAL EXAM


GENERAL: Propped up in bed, awake, weak appearing 


HEENT: Pupils equal, + NGT, oral cavity dry 


NECK:  Trach/vent 


LUNGS: rhonchi 


HEART:  S1, S2, regular 


ABDOMEN: Distended, tender, hypoactive BS, 


: [place] (4/14)


EXTREMITIES: Generalized edema, no cyanosis, SCDs bilaterally 


DERMATOLOGIC:  Warm and dry.  No generalized rash.  


CENTRAL NERVOUS SYSTEM: Extremely weak, nods to few simple questions 


HDC has been removed


BALAJI (4/14) clean





Labs


Lab





Laboratory Tests








Test


 4/25/20


12:02 4/25/20


17:54 4/26/20


00:25 4/26/20


05:45


 


Glucose (Fingerstick)


 142 mg/dL


(70-99) 128 mg/dL


(70-99) 129 mg/dL


(70-99) 137 mg/dL


(70-99)


 


Test


 4/26/20


06:00 


 


 





 


White Blood Count


 9.3 x10^3/uL


(4.0-11.0) 


 


 





 


Red Blood Count


 2.60 x10^6/uL


(3.50-5.40) 


 


 





 


Hemoglobin


 7.7 g/dL


(12.0-15.5) 


 


 





 


Hematocrit


 23.6 %


(36.0-47.0) 


 


 





 


Mean Corpuscular Volume 91 fL ()    


 


Mean Corpuscular Hemoglobin 30 pg (25-35)    


 


Mean Corpuscular Hemoglobin


Concent 33 g/dL


(31-37) 


 


 





 


Red Cell Distribution Width


 18.3 %


(11.5-14.5) 


 


 





 


Platelet Count


 286 x10^3/uL


(140-400) 


 


 





 


Neutrophils (%) (Auto) 79 % (31-73)    


 


Lymphocytes (%) (Auto) 14 % (24-48)    


 


Monocytes (%) (Auto) 6 % (0-9)    


 


Eosinophils (%) (Auto) 1 % (0-3)    


 


Basophils (%) (Auto) 0 % (0-3)    


 


Neutrophils # (Auto)


 7.4 x10^3/uL


(1.8-7.7) 


 


 





 


Lymphocytes # (Auto)


 1.2 x10^3/uL


(1.0-4.8) 


 


 





 


Monocytes # (Auto)


 0.6 x10^3/uL


(0.0-1.1) 


 


 





 


Eosinophils # (Auto)


 0.1 x10^3/uL


(0.0-0.7) 


 


 





 


Basophils # (Auto)


 0.0 x10^3/uL


(0.0-0.2) 


 


 





 


Sodium Level


 149 mmol/L


(136-145) 


 


 





 


Potassium Level


 3.5 mmol/L


(3.5-5.1) 


 


 





 


Chloride Level


 110 mmol/L


() 


 


 





 


Carbon Dioxide Level


 28 mmol/L


(21-32) 


 


 





 


Anion Gap 11 (6-14)    


 


Blood Urea Nitrogen


 60 mg/dL


(7-20) 


 


 





 


Creatinine


 1.0 mg/dL


(0.6-1.0) 


 


 





 


Estimated GFR


(Cockcroft-Gault) 58.9 


 


 


 





 


Glucose Level


 142 mg/dL


(70-99) 


 


 





 


Calcium Level


 8.6 mg/dL


(8.5-10.1) 


 


 











Micro


CT A/P, 4/22


IMPRESSION:


1. Pancreas again appears diffusely enlarged and hypodense compatible with


diffuse pancreatitis and pancreatic necrosis.


2. Extensive retroperitoneal fluid collections are again identified, 


slightly larger in size.


3. Moderate to large volume of abdominal and pelvic ascites is again 


identified. Mild generalized increase in fatty inflammation or anasarca.


4. Cholelithiasis with possible cholecystitis.


5. Moderate pleural effusions, may be slightly improved. 


Infiltrate/atelectasis in both lung bases.


6. Small hepatic low-density lesion is too small to characterize, may 


represent a small cyst, likely unchanged.





Objective


Assessment


Persistent fever


   - New left IJ/Chino 4/14. PICC removed 4/14. HDC removed 


   -? pancreatitis. blood cults 4/10 neg. Urine - neg, 


Leukocytosis 4/10 -improved 


Ascites s/p paracentesis 4/15 - 4300 ml. cultures neg 


Severe acute gallstone pancreatitis with necrosis


   -CT 4/14 necrotizing pancreatitis with fluid and phlegmon at the pancreas 


Hypotension off levaphed 


Julian Pleural effusions


JED requiring HD 


   - s/p RIJ temporary dialysis catheter replacement, 4/2


Vent dependent respiratory failure


   -s/p Trach placement 


   -lung opacities, COVID-19 neg 


Anasarca - worse


Anemia - S/p PRBC 3/26


Hypocalcemia 


Prediabetes


HTN


Diarrhea, C. diff neg 3/23


Anemia - S/p PRBCs





Plan


Plan of Care


cont merrem (4/8), micafungin, and dapto 


Monitor for abx toxicities. CK in am 


zyvox changed to dapto (4/22), to rule out serotonin sy causing fever


Maintain aspiration precautions 


Surgery on Monday, 4/27 planned 





D/w nursing





Critically ill


Attending Co-Sign


The patient was seen and interviewed as well as examined at the bedside. The nadia em was reviewed. The case was discussed. Agree with the plan of care.











HAVEN WINTERS        Apr 26, 2020 08:52


LINN FRANZ MD               Apr 26, 2020 10:31

## 2020-04-26 NOTE — NUR
Pharmacy TPN Dosing Note



S: JESENIA RICH is a 49 year old F Currently receiving Central Continuous TPN started 
03/18/20



B:Pertinent PMH: 

Necrotizing pancreatitis

Height: 5 feet, 8 inches

Weight: 110.2 kg



Current diet: NPO 



LABS:

Sodium:    149 

Potassium: 3.5 

Chloride:  110 

Calcium:   8.6 

Corrected Calcium: 9.64 

Magnesium: 2.1 

CO2:       28 

SCr:       1.0 

Glucose:   129-148 

Albumin:   2.7 

AST:       23 

ALT:       11 



TPN FORMULA:

TPN TYPE:  Central Continuous

AMINO ACIDS:         125 gm

DEXTROSE:            225 gm

LIPIDS:              20 gm

SODIUM CHLORIDE:     - mEq

SODIUM ACETATE:      - mEq

SODIUM PHOSPHATE:    - mmol

POTASSIUM CHLORIDE:  - mEq

POTASSIUM ACETATE:   55 mEq

POTASSIUM PHOSPHATE: - mmol

MAGNESIUM:           20 mEq

CALCIUM:             10 mEq

INSULIN:             35 units

MULTIPLE VITAMIN:    10 ml

TRACE ELEMENTS:      0.5 ml(s)



TPN PLAN:  

Na trending down but remains elevated; will remove remaining 50 meq Na

acetate in TPN and increase volume from 1800 to 1920 ml. BMP in AM.





R: Continue TPN AS ABOVE.

Will monitor electrolytes, glucose, and tolerance to TPN.



 CANDACE BELTRE [facility], 04/26/20 [id number]

## 2020-04-26 NOTE — PDOC
PULMONARY PROGRESS NOTES


Subjective


Patient intubated on 3/23 , s/p trach 4/6, on vent


tolerated ps for 9 hrs yesterday, on predecex, mod trach secretion, febrile


Vitals





Vital Signs








  Date Time  Temp Pulse Resp B/P (MAP) Pulse Ox O2 Delivery O2 Flow Rate FiO2


 


4/26/20 10:17   26  99 Ventilator  


 


4/26/20 10:00  81  137/83 (101)    


 


4/26/20 08:00 100.5       





 100.5       








Comments


ros unable to obtain  on vent


General:  Alert


HEENT:  Other (nc at perrl   nose clear  nech  trach site ok  no lad   no 

thyromegaly)


Lungs:  Crackles, Other (dimished in BLL  nc at perrl   nose clear   neck trach 

site ok   no lad  no thyromegaly)


Cardiovascular:  S1, S2


Abdomen:  Soft, Non-tender, Other (distended)


Neuro Exam:  Alert


Extremities:  Other (+3 generalized edema )


Skin:  Warm, Dry


Labs





Laboratory Tests








Test


 4/24/20


13:11 4/25/20


00:54 4/25/20


05:37 4/25/20


06:00


 


Glucose (Fingerstick)


 163 mg/dL


(70-99) 121 mg/dL


(70-99) 123 mg/dL


(70-99) 





 


White Blood Count


 


 


 


 9.2 x10^3/uL


(4.0-11.0)


 


Red Blood Count


 


 


 


 3.50 x10^6/uL


(3.50-5.40)


 


Hemoglobin


 


 


 


 10.8 g/dL


(12.0-15.5)


 


Hematocrit


 


 


 


 31.7 %


(36.0-47.0)


 


Mean Corpuscular Volume    91 fL () 


 


Mean Corpuscular Hemoglobin    31 pg (25-35) 


 


Mean Corpuscular Hemoglobin


Concent 


 


 


 34 g/dL


(31-37)


 


Red Cell Distribution Width


 


 


 


 18.2 %


(11.5-14.5)


 


Platelet Count


 


 


 


 237 x10^3/uL


(140-400)


 


Neutrophils (%) (Auto)    78 % (31-73) 


 


Lymphocytes (%) (Auto)    13 % (24-48) 


 


Monocytes (%) (Auto)    8 % (0-9) 


 


Eosinophils (%) (Auto)    1 % (0-3) 


 


Basophils (%) (Auto)    0 % (0-3) 


 


Neutrophils # (Auto)


 


 


 


 7.2 x10^3/uL


(1.8-7.7)


 


Lymphocytes # (Auto)


 


 


 


 1.2 x10^3/uL


(1.0-4.8)


 


Monocytes # (Auto)


 


 


 


 0.7 x10^3/uL


(0.0-1.1)


 


Eosinophils # (Auto)


 


 


 


 0.1 x10^3/uL


(0.0-0.7)


 


Basophils # (Auto)


 


 


 


 0.0 x10^3/uL


(0.0-0.2)


 


Test


 4/25/20


12:02 4/25/20


17:54 4/26/20


00:25 4/26/20


05:45


 


Glucose (Fingerstick)


 142 mg/dL


(70-99) 128 mg/dL


(70-99) 129 mg/dL


(70-99) 137 mg/dL


(70-99)


 


Test


 4/26/20


06:00 


 


 





 


White Blood Count


 9.3 x10^3/uL


(4.0-11.0) 


 


 





 


Red Blood Count


 2.60 x10^6/uL


(3.50-5.40) 


 


 





 


Hemoglobin


 7.7 g/dL


(12.0-15.5) 


 


 





 


Hematocrit


 23.6 %


(36.0-47.0) 


 


 





 


Mean Corpuscular Volume 91 fL ()    


 


Mean Corpuscular Hemoglobin 30 pg (25-35)    


 


Mean Corpuscular Hemoglobin


Concent 33 g/dL


(31-37) 


 


 





 


Red Cell Distribution Width


 18.3 %


(11.5-14.5) 


 


 





 


Platelet Count


 286 x10^3/uL


(140-400) 


 


 





 


Neutrophils (%) (Auto) 79 % (31-73)    


 


Lymphocytes (%) (Auto) 14 % (24-48)    


 


Monocytes (%) (Auto) 6 % (0-9)    


 


Eosinophils (%) (Auto) 1 % (0-3)    


 


Basophils (%) (Auto) 0 % (0-3)    


 


Neutrophils # (Auto)


 7.4 x10^3/uL


(1.8-7.7) 


 


 





 


Lymphocytes # (Auto)


 1.2 x10^3/uL


(1.0-4.8) 


 


 





 


Monocytes # (Auto)


 0.6 x10^3/uL


(0.0-1.1) 


 


 





 


Eosinophils # (Auto)


 0.1 x10^3/uL


(0.0-0.7) 


 


 





 


Basophils # (Auto)


 0.0 x10^3/uL


(0.0-0.2) 


 


 





 


Sodium Level


 149 mmol/L


(136-145) 


 


 





 


Potassium Level


 3.5 mmol/L


(3.5-5.1) 


 


 





 


Chloride Level


 110 mmol/L


() 


 


 





 


Carbon Dioxide Level


 28 mmol/L


(21-32) 


 


 





 


Anion Gap 11 (6-14)    


 


Blood Urea Nitrogen


 60 mg/dL


(7-20) 


 


 





 


Creatinine


 1.0 mg/dL


(0.6-1.0) 


 


 





 


Estimated GFR


(Cockcroft-Gault) 58.9 


 


 


 





 


Glucose Level


 142 mg/dL


(70-99) 


 


 





 


Calcium Level


 8.6 mg/dL


(8.5-10.1) 


 


 











Laboratory Tests








Test


 4/25/20


12:02 4/25/20


17:54 4/26/20


00:25 4/26/20


05:45


 


Glucose (Fingerstick)


 142 mg/dL


(70-99) 128 mg/dL


(70-99) 129 mg/dL


(70-99) 137 mg/dL


(70-99)


 


Test


 4/26/20


06:00 


 


 





 


White Blood Count


 9.3 x10^3/uL


(4.0-11.0) 


 


 





 


Red Blood Count


 2.60 x10^6/uL


(3.50-5.40) 


 


 





 


Hemoglobin


 7.7 g/dL


(12.0-15.5) 


 


 





 


Hematocrit


 23.6 %


(36.0-47.0) 


 


 





 


Mean Corpuscular Volume 91 fL ()    


 


Mean Corpuscular Hemoglobin 30 pg (25-35)    


 


Mean Corpuscular Hemoglobin


Concent 33 g/dL


(31-37) 


 


 





 


Red Cell Distribution Width


 18.3 %


(11.5-14.5) 


 


 





 


Platelet Count


 286 x10^3/uL


(140-400) 


 


 





 


Neutrophils (%) (Auto) 79 % (31-73)    


 


Lymphocytes (%) (Auto) 14 % (24-48)    


 


Monocytes (%) (Auto) 6 % (0-9)    


 


Eosinophils (%) (Auto) 1 % (0-3)    


 


Basophils (%) (Auto) 0 % (0-3)    


 


Neutrophils # (Auto)


 7.4 x10^3/uL


(1.8-7.7) 


 


 





 


Lymphocytes # (Auto)


 1.2 x10^3/uL


(1.0-4.8) 


 


 





 


Monocytes # (Auto)


 0.6 x10^3/uL


(0.0-1.1) 


 


 





 


Eosinophils # (Auto)


 0.1 x10^3/uL


(0.0-0.7) 


 


 





 


Basophils # (Auto)


 0.0 x10^3/uL


(0.0-0.2) 


 


 





 


Sodium Level


 149 mmol/L


(136-145) 


 


 





 


Potassium Level


 3.5 mmol/L


(3.5-5.1) 


 


 





 


Chloride Level


 110 mmol/L


() 


 


 





 


Carbon Dioxide Level


 28 mmol/L


(21-32) 


 


 





 


Anion Gap 11 (6-14)    


 


Blood Urea Nitrogen


 60 mg/dL


(7-20) 


 


 





 


Creatinine


 1.0 mg/dL


(0.6-1.0) 


 


 





 


Estimated GFR


(Cockcroft-Gault) 58.9 


 


 


 





 


Glucose Level


 142 mg/dL


(70-99) 


 


 





 


Calcium Level


 8.6 mg/dL


(8.5-10.1) 


 


 











Medications





Active Scripts








 Medications  Dose


 Route/Sig


 Max Daily Dose Days Date Category


 


 Bisoprolol


 Fumarate 5 Mg


 Tablet  10 Mg


 PO DAILY


   3/16/20 Reported








Comments


cxr reviewed.





Impression


.


IMPRESSION:


1.  Acute hypoxemic respiratory failure secondary to ARDS status post trach,


2.  Gallstone pancreatitis


3.  Severe metabolic acidosis.stable


4.  Acute kidney injury-stable, ON HD-- continue to improve 


5.  Acute gallstone pancreatitis.


6.  Hypoalbuminemia.


7.  Moderate persistent effusions


8.  Fever-persist.  Per ID, per surgery


9.  Chronic anemia


10. Covid 19 testing negative


11. Moderate to large ascites-S/P paracentisis


S/P paracentisis with 4 liters removed on 4/15/20





Plan


.


cont vent support, setting reviewed. weaning as tolerated


Patient still febrile,  fu cxs, surgery on monday


PT


hep sq and protonix for prophylaxis


Follow surgery recs


Follow ID rec, abx per id


Follow nephrology recs 


Nutritional support per surgery


continue TPN for nutrition 


HD catheter to be removed today


DVT/GI PPX 


am cxr


d/w RN/RT











MAN BLAKE MD               Apr 26, 2020 10:53

## 2020-04-26 NOTE — PDOC
[facility] PROGRESS NOTE


Chief Complaint


Chief Complaint


Respiratory failure requiring mechanical ventilation (on vent since 3/23)


Tracheostomy


bilateral pleural effusions/pulm edema 


Sepsis


Severe Acute gallstone pancreatitis (not a surgical candidate at this time) with

necrosis


Acute kidney failure now requiring dialysis


Salpingitis


Gallstones (Calculus of gallbladder with acute cholecystitis without 

obstruction)


HTN 


Leukocytosis 


Hypoxia


Uterine fibroid


Intractable pain


Intractable nausea


Covid 19 negative. 


Acute on chronic anemia 


EEG: No seizure activity.


ESRD on HD


Hyperglycemia, persistently in 200s





History of Present Illness


History of Present Illness


4-


Patient seen and examined in the ICU


Chart reviewed


Discussed with RN


She is scheduled for surgery tomorrow morning (lap/open Deb with possible 

partial pancreatectomy)


Patient is still mechanically ventilated


SIMV/12/18 of pressure support 30% FiO2


On IV TPN and Precedex











4-


Patient seen and examined in the ICU


She remains mechanically ventilated via tracheostomy


AC/18/4 50/30%


She is on IV TPN IV Precedex


Antibiotics include meropenem and micafungin


Chart reviewed


Discussed with RN room


She remains critically ill








4-


Patient seen and examined in the ICU


She remains mechanically ventilated


Spontaneous respirations with 25 of pressure support 30% FiO2


NG to suction


She is on TPN


She is sedated with Precedex


Discussed with RN


Chart reviewed


She remains critically ill








4-


Patient seen and examined in the ICU


She remains mechanically ventilated


AC/18/4 50/30% FiO2


Chart reviewed


Discussed with RN


She is critically ill











4-


Patient seen and examined in the ICU


She has a tracheostomy


AC/18/4 50/30%


Chart reviewed


Discussed with RN


She remains extremely critically ill








4-


Patient seen in ICU room 116 she is currently on dialysis


Tries to mumble is grabbing at her face with her left hand


She is extremely weak cannot communicate well


She is swollen


Chart reviewed


Discussed with RN


Very critically ill











4- patient seen and examined in the ICU





She is on the vent via tracheostomy


AC/14/4 50/35%


Has IV TPN


NG to suction


Wearing mitts for patient safety


Still seems encephalopathic


Chart reviewed


Discussed with RN


She remains critically ill











Ms Tadeo is a 48yo F w/ PMHx HTN, prediabetes who presented to the emergency 

room with complaints of abdominal pain on 3/16/2020. Found with Lipase 84410, 

, , Bilirubin 1.4.


CT abdomen confirms pancreatic inflammation, peripancreatic fluid and 

inflammatory changes around the pancreas consistent with pancreatitis. 

Cholelithiasis and 1.4cm uterine fibroid as well as possible left salpingitis. 

Admitted for further care


GI, General surgery, ID, Pulm consulted.





3/17: No urine output. Added dilaudid for pain, PICC placed per IR. Renal US 

negative.Seen bedside in ICU, given 2L additional NSS and albumin infusion. 

Still hypotensive, started on levophed. Repeat CT abdomen w/ necrosis. 


3/18: O2 saturation 87% on nasal cannula oxygen. Dialysis catheter per 

nephrology


3/19: She is now on BiPAP appears more ill, now on dialysis


3/20: Seen on BiPAP. Her mother and another family member are present and seemed

to be good support for her. Currently on dialysis. Appears critically ill


3/21: Overnight Tmax 101.7 , still on BiPAP FiO2 40%, still on low dose Levophed

gtt, TPN initiated. On dialysis


4/6: Tracheostomy


4/12: S/p tracheostomy on vent spontaneous respirations with 5 of pressure 

support 35% FiO2, rectal tube and a [place]ino, off pressors


4/13: Off pressors. Seen on dialysis this morning. BUN 80. Tracking with her 

eyes. Still on vent via trach.


4/14: BUN 68, Cr 1.7. Temp 100.2F axillary. WBC 9.8. Still on vent via trach. 

Tracking reasonably well. In obvious discomfort. Removed PICC and CVC LIJ and 

replaced. Tips sent for culture. CT chest/abd/pelvis with bilateral pleural 

effusion and ascites.


4/15: Renal function stable. Still on vent. More interactive today. Miming wish 

for food. Plan discussed for thoracentesis/paracentesis with daughters today. 

They were under impression patient was doing worse due to a miscommunication 

which has been clarified over the phone. 4.3L removed.


4/16: BUN 88, Cr 1.8. Much more interactive today. Appears more comfortable 

today.


4/17: Febrile overnight 101.8F. More interactive, still on vent. Asking for ice 

by miming.


4/18: Afebrile overnight. TMax last 24 hours 100.6F. Hb 7.1. Interactive when 

awake.





Afebrile. Hb 7. Not tolerating vent wean. Very interactive, on low dose 

precedex. Excellent UOP over the past 72 hours





Plan:


Cont vent weaning, dialysis


Trach shield during day if ok with pulm. Would recommend ABG after 4 hours to 

r/o CO2 retention, however and still vent overnight





Vitals/I&O


Vitals/I&O:





                                   Vital Signs








  Date Time  Temp Pulse Resp B/P (MAP) Pulse Ox O2 Delivery O2 Flow Rate FiO2


 


4/26/20 12:00 100.3 90 36 128/68 (88) 99 Ventilator  





 100.3       














                                    I & O   


 


 4/25/20 4/25/20 4/26/20





 15:00 23:00 07:00


 


Intake Total 150 ml 1404 ml 1360 ml


 


Output Total 530 ml 990 ml 475 ml


 


Balance -380 ml 414 ml 885 ml











Physical Exam


Physical Exam:


GENERAL: Propped up in bed, awake, weak appearing 


HEENT: Pupils equal, + NGT, oral cavity dry 


NECK:  Trach/vent 


LUNGS: rhonchi 


HEART:  S1, S2, regular 


ABDOMEN: Distended, tender, hypoactive BS, 


: [place] (4/14)


EXTREMITIES: Generalized edema, no cyanosis, SCDs bilaterally 


DERMATOLOGIC:  Warm and dry.  No generalized rash.  


CENTRAL NERVOUS SYSTEM: Extremely weak, nods to few simple questions 


HDC has been removed


LI (4/14) clean


General:  Alert, mild distress


Heart:  Regular rate, Normal S1, Other (increased rate)


Lungs:  Crackles, Other (dimished in BLL  nc at perrl   nose clear   neck trach 

site ok   no lad  no thyromegaly)


Abdomen:  Soft, Other (mild TTP)


Extremities:  Other (ANASARCA)


Skin:  Other (mottling noted to extremities )





Labs


Labs:





Laboratory Tests








Test


 4/25/20


17:54 4/26/20


00:25 4/26/20


05:45 4/26/20


06:00


 


Glucose (Fingerstick)


 128 mg/dL


(70-99) 129 mg/dL


(70-99) 137 mg/dL


(70-99) 





 


White Blood Count


 


 


 


 9.3 x10^3/uL


(4.0-11.0)


 


Red Blood Count


 


 


 


 2.60 x10^6/uL


(3.50-5.40)


 


Hemoglobin


 


 


 


 7.7 g/dL


(12.0-15.5)


 


Hematocrit


 


 


 


 23.6 %


(36.0-47.0)


 


Mean Corpuscular Volume    91 fL () 


 


Mean Corpuscular Hemoglobin    30 pg (25-35) 


 


Mean Corpuscular Hemoglobin


Concent 


 


 


 33 g/dL


(31-37)


 


Red Cell Distribution Width


 


 


 


 18.3 %


(11.5-14.5)


 


Platelet Count


 


 


 


 286 x10^3/uL


(140-400)


 


Neutrophils (%) (Auto)    79 % (31-73) 


 


Lymphocytes (%) (Auto)    14 % (24-48) 


 


Monocytes (%) (Auto)    6 % (0-9) 


 


Eosinophils (%) (Auto)    1 % (0-3) 


 


Basophils (%) (Auto)    0 % (0-3) 


 


Neutrophils # (Auto)


 


 


 


 7.4 x10^3/uL


(1.8-7.7)


 


Lymphocytes # (Auto)


 


 


 


 1.2 x10^3/uL


(1.0-4.8)


 


Monocytes # (Auto)


 


 


 


 0.6 x10^3/uL


(0.0-1.1)


 


Eosinophils # (Auto)


 


 


 


 0.1 x10^3/uL


(0.0-0.7)


 


Basophils # (Auto)


 


 


 


 0.0 x10^3/uL


(0.0-0.2)


 


Sodium Level


 


 


 


 149 mmol/L


(136-145)


 


Potassium Level


 


 


 


 3.5 mmol/L


(3.5-5.1)


 


Chloride Level


 


 


 


 110 mmol/L


()


 


Carbon Dioxide Level


 


 


 


 28 mmol/L


(21-32)


 


Anion Gap    11 (6-14) 


 


Blood Urea Nitrogen


 


 


 


 60 mg/dL


(7-20)


 


Creatinine


 


 


 


 1.0 mg/dL


(0.6-1.0)


 


Estimated GFR


(Cockcroft-Gault) 


 


 


 58.9 





 


Glucose Level


 


 


 


 142 mg/dL


(70-99)


 


Calcium Level


 


 


 


 8.6 mg/dL


(8.5-10.1)


 


Test


 4/26/20


12:09 


 


 





 


Glucose (Fingerstick)


 148 mg/dL


(70-99) 


 


 














Assessment and Plan


Assessmemt and Plan


Problems


Medical Problems:


(1) Acute pancreatitis


Status: Acute  





(2) Cholelithiasis


Status: Acute  





Respiratory failure requiring intubation


Status post tracheostomy


Severe Acute gallstone pancreatitis (she is not a surgical candidate as she is 

too ill)


Acute kidney failure now requiring dialysis


Salpingo--itis


Gallstones (Calculus of gallbladder with acute cholecystitis without 

obstruction)


HTN 


Leukocytosis 


Hypoxia


Uterine fibroid


Hypoxia with respiratory failure


Intractable pain


Intractable nausea





Plan


Going to the OR in the a.m. for cholecystectomy and possible partial p

ancreatectomy


ICU monitoring


Tracheostomy care


NG suctioning


Vent management per pulm. pressure support as tolerated


Dialysis per nephro


Merrem (4/8) and Zyvox (4/10) and micafungin 


Follow cultures


Trach care


Nutritional support


When necessary levo fed


Neuro following


No plans for sx at present as she is to ill still


Discharge disposition pending (? Eventual LTAC)


She is still critically ill


Prognosis very guarded but very very slowly improving?


Total time 32 min





Comment


Review of Relevant


I have reviewed the following items josy (where applicable) has been applied.


Medications:





Current Medications








 Medications


  (Trade)  Dose


 Ordered  Sig/Yee


 Route


 PRN Reason  Start Time


 Stop Time Status Last Admin


Dose Admin


 


 Sodium Acetate 50


 meq/Potassium


 Acetate 55 meq/


 Magnesium Sulfate


 20 meq/Calcium


 Gluconate 10 meq/


 Multivitamins 10


 ml/Chromium/


 Copper/Manganese/


 Seleni/Zn 0.5 ml/


 Insulin Human


 Regular 35 unit/


 Total Parenteral


 Nutrition/Amino


 Acids/Dextrose/


 Fat Emulsion


 Intravenous  1,800 ml @ 


 75 mls/hr  TPN  CONT


 IV


   4/25/20 22:00


 4/26/20 21:59  4/25/20 22:03





 


 Daptomycin 430 mg/


 Sodium Chloride  50 ml @ 


 100 mls/hr  Q24H


 IV


   4/25/20 13:00


    4/25/20 13:17














Hemodynamically unstable?:  No


Is patient in severe pain?:  No


Is NPO status required?:  Yes











HECTOR MASON III DO           Apr 26, 2020 13:00

## 2020-04-27 VITALS — DIASTOLIC BLOOD PRESSURE: 68 MMHG | SYSTOLIC BLOOD PRESSURE: 130 MMHG

## 2020-04-27 VITALS — DIASTOLIC BLOOD PRESSURE: 72 MMHG | SYSTOLIC BLOOD PRESSURE: 124 MMHG

## 2020-04-27 VITALS — DIASTOLIC BLOOD PRESSURE: 83 MMHG | SYSTOLIC BLOOD PRESSURE: 132 MMHG

## 2020-04-27 VITALS — SYSTOLIC BLOOD PRESSURE: 153 MMHG | DIASTOLIC BLOOD PRESSURE: 86 MMHG

## 2020-04-27 VITALS — SYSTOLIC BLOOD PRESSURE: 129 MMHG | DIASTOLIC BLOOD PRESSURE: 75 MMHG

## 2020-04-27 VITALS — DIASTOLIC BLOOD PRESSURE: 58 MMHG | SYSTOLIC BLOOD PRESSURE: 107 MMHG

## 2020-04-27 VITALS — DIASTOLIC BLOOD PRESSURE: 75 MMHG | SYSTOLIC BLOOD PRESSURE: 147 MMHG

## 2020-04-27 VITALS — SYSTOLIC BLOOD PRESSURE: 133 MMHG | DIASTOLIC BLOOD PRESSURE: 83 MMHG

## 2020-04-27 VITALS — SYSTOLIC BLOOD PRESSURE: 121 MMHG | DIASTOLIC BLOOD PRESSURE: 73 MMHG

## 2020-04-27 VITALS — SYSTOLIC BLOOD PRESSURE: 154 MMHG | DIASTOLIC BLOOD PRESSURE: 76 MMHG

## 2020-04-27 VITALS — DIASTOLIC BLOOD PRESSURE: 76 MMHG | SYSTOLIC BLOOD PRESSURE: 155 MMHG

## 2020-04-27 VITALS — DIASTOLIC BLOOD PRESSURE: 80 MMHG | SYSTOLIC BLOOD PRESSURE: 130 MMHG

## 2020-04-27 VITALS — DIASTOLIC BLOOD PRESSURE: 66 MMHG | SYSTOLIC BLOOD PRESSURE: 114 MMHG

## 2020-04-27 VITALS — SYSTOLIC BLOOD PRESSURE: 117 MMHG | DIASTOLIC BLOOD PRESSURE: 64 MMHG

## 2020-04-27 VITALS — SYSTOLIC BLOOD PRESSURE: 115 MMHG | DIASTOLIC BLOOD PRESSURE: 69 MMHG

## 2020-04-27 VITALS — SYSTOLIC BLOOD PRESSURE: 97 MMHG | DIASTOLIC BLOOD PRESSURE: 60 MMHG

## 2020-04-27 VITALS — SYSTOLIC BLOOD PRESSURE: 151 MMHG | DIASTOLIC BLOOD PRESSURE: 82 MMHG

## 2020-04-27 VITALS — SYSTOLIC BLOOD PRESSURE: 118 MMHG | DIASTOLIC BLOOD PRESSURE: 81 MMHG

## 2020-04-27 VITALS — DIASTOLIC BLOOD PRESSURE: 73 MMHG | SYSTOLIC BLOOD PRESSURE: 120 MMHG

## 2020-04-27 VITALS — SYSTOLIC BLOOD PRESSURE: 133 MMHG | DIASTOLIC BLOOD PRESSURE: 81 MMHG

## 2020-04-27 VITALS — DIASTOLIC BLOOD PRESSURE: 87 MMHG | SYSTOLIC BLOOD PRESSURE: 151 MMHG

## 2020-04-27 VITALS — DIASTOLIC BLOOD PRESSURE: 81 MMHG | SYSTOLIC BLOOD PRESSURE: 104 MMHG

## 2020-04-27 VITALS — SYSTOLIC BLOOD PRESSURE: 116 MMHG | DIASTOLIC BLOOD PRESSURE: 60 MMHG

## 2020-04-27 LAB
ANION GAP SERPL CALC-SCNC: 8 MMOL/L (ref 6–14)
BASOPHILS # BLD AUTO: 0 X10^3/UL (ref 0–0.2)
BASOPHILS NFR BLD: 0 % (ref 0–3)
BUN SERPL-MCNC: 57 MG/DL (ref 7–20)
CALCIUM SERPL-MCNC: 8.5 MG/DL (ref 8.5–10.1)
CHLORIDE SERPL-SCNC: 111 MMOL/L (ref 98–107)
CK SERPL-CCNC: 24 U/L (ref 26–192)
CO2 SERPL-SCNC: 30 MMOL/L (ref 21–32)
CREAT SERPL-MCNC: 1.1 MG/DL (ref 0.6–1)
EOSINOPHIL NFR BLD: 0.1 X10^3/UL (ref 0–0.7)
EOSINOPHIL NFR BLD: 1 % (ref 0–3)
ERYTHROCYTE [DISTWIDTH] IN BLOOD BY AUTOMATED COUNT: 18.4 % (ref 11.5–14.5)
GFR SERPLBLD BASED ON 1.73 SQ M-ARVRAT: 52.8 ML/MIN
GLUCOSE SERPL-MCNC: 123 MG/DL (ref 70–99)
HCT VFR BLD CALC: 24.6 % (ref 36–47)
HGB BLD-MCNC: 8.2 G/DL (ref 12–15.5)
LYMPHOCYTES # BLD: 1.4 X10^3/UL (ref 1–4.8)
LYMPHOCYTES NFR BLD AUTO: 15 % (ref 24–48)
MCH RBC QN AUTO: 30 PG (ref 25–35)
MCHC RBC AUTO-ENTMCNC: 33 G/DL (ref 31–37)
MCV RBC AUTO: 90 FL (ref 79–100)
MONO #: 0.5 X10^3/UL (ref 0–1.1)
MONOCYTES NFR BLD: 6 % (ref 0–9)
NEUT #: 7.1 X10^3/UL (ref 1.8–7.7)
NEUTROPHILS NFR BLD AUTO: 78 % (ref 31–73)
PLATELET # BLD AUTO: 323 X10^3/UL (ref 140–400)
POTASSIUM SERPL-SCNC: 3.6 MMOL/L (ref 3.5–5.1)
RBC # BLD AUTO: 2.72 X10^6/UL (ref 3.5–5.4)
SODIUM SERPL-SCNC: 149 MMOL/L (ref 136–145)
WBC # BLD AUTO: 9.2 X10^3/UL (ref 4–11)

## 2020-04-27 PROCEDURE — 0D9630Z DRAINAGE OF STOMACH WITH DRAINAGE DEVICE, PERCUTANEOUS APPROACH: ICD-10-PCS | Performed by: SURGERY

## 2020-04-27 RX ADMIN — DEXTROSE SCH MLS/HR: 50 INJECTION, SOLUTION INTRAVENOUS at 08:09

## 2020-04-27 RX ADMIN — MEROPENEM SCH MLS/HR: 500 INJECTION, POWDER, FOR SOLUTION INTRAVENOUS at 22:00

## 2020-04-27 RX ADMIN — FENTANYL CITRATE PRN MCG: 50 INJECTION INTRAMUSCULAR; INTRAVENOUS at 01:56

## 2020-04-27 RX ADMIN — PANTOPRAZOLE SODIUM SCH MG: 40 INJECTION, POWDER, FOR SOLUTION INTRAVENOUS at 08:08

## 2020-04-27 RX ADMIN — FENTANYL CITRATE PRN MCG: 50 INJECTION INTRAMUSCULAR; INTRAVENOUS at 13:32

## 2020-04-27 RX ADMIN — INSULIN LISPRO SCH UNITS: 100 INJECTION, SOLUTION INTRAVENOUS; SUBCUTANEOUS at 18:06

## 2020-04-27 RX ADMIN — DEXMEDETOMIDINE HYDROCHLORIDE PRN MLS/HR: 100 INJECTION, SOLUTION, CONCENTRATE INTRAVENOUS at 03:43

## 2020-04-27 RX ADMIN — INSULIN LISPRO SCH UNITS: 100 INJECTION, SOLUTION INTRAVENOUS; SUBCUTANEOUS at 00:00

## 2020-04-27 RX ADMIN — PROCHLORPERAZINE EDISYLATE PRN MG: 5 INJECTION INTRAMUSCULAR; INTRAVENOUS at 13:59

## 2020-04-27 RX ADMIN — MEROPENEM SCH MLS/HR: 500 INJECTION, POWDER, FOR SOLUTION INTRAVENOUS at 09:24

## 2020-04-27 RX ADMIN — INSULIN LISPRO SCH UNITS: 100 INJECTION, SOLUTION INTRAVENOUS; SUBCUTANEOUS at 12:00

## 2020-04-27 RX ADMIN — DEXMEDETOMIDINE HYDROCHLORIDE PRN MLS/HR: 100 INJECTION, SOLUTION, CONCENTRATE INTRAVENOUS at 08:19

## 2020-04-27 RX ADMIN — DEXMEDETOMIDINE HYDROCHLORIDE PRN MLS/HR: 100 INJECTION, SOLUTION, CONCENTRATE INTRAVENOUS at 19:23

## 2020-04-27 RX ADMIN — Medication PRN EACH: at 14:03

## 2020-04-27 RX ADMIN — ACETAMINOPHEN PRN MG: 650 SUPPOSITORY RECTAL at 00:32

## 2020-04-27 RX ADMIN — INSULIN LISPRO SCH UNITS: 100 INJECTION, SOLUTION INTRAVENOUS; SUBCUTANEOUS at 06:00

## 2020-04-27 RX ADMIN — Medication PRN MLS/HR: at 15:25

## 2020-04-27 RX ADMIN — DEXMEDETOMIDINE HYDROCHLORIDE PRN MLS/HR: 100 INJECTION, SOLUTION, CONCENTRATE INTRAVENOUS at 00:37

## 2020-04-27 RX ADMIN — DEXMEDETOMIDINE HYDROCHLORIDE PRN MLS/HR: 100 INJECTION, SOLUTION, CONCENTRATE INTRAVENOUS at 10:18

## 2020-04-27 RX ADMIN — DEXMEDETOMIDINE HYDROCHLORIDE PRN MLS/HR: 100 INJECTION, SOLUTION, CONCENTRATE INTRAVENOUS at 15:47

## 2020-04-27 RX ADMIN — DEXMEDETOMIDINE HYDROCHLORIDE PRN MLS/HR: 100 INJECTION, SOLUTION, CONCENTRATE INTRAVENOUS at 22:00

## 2020-04-27 RX ADMIN — DAPTOMYCIN SCH MLS/HR: 500 INJECTION, POWDER, LYOPHILIZED, FOR SOLUTION INTRAVENOUS at 13:41

## 2020-04-27 RX ADMIN — BACITRACIN SCH MLS/HR: 5000 INJECTION, POWDER, FOR SOLUTION INTRAMUSCULAR at 13:37

## 2020-04-27 RX ADMIN — HEPARIN SODIUM SCH UNIT: 5000 INJECTION, SOLUTION INTRAVENOUS; SUBCUTANEOUS at 22:02

## 2020-04-27 NOTE — PDOC
PROGRESS NOTES


Assessment


Problems


Medical Problems:


(1) Acute pancreatitis


Status: Acute  





(2) Cholelithiasis


Status: Acute  





Respiratory failure.


Seizure.


Metabolic encephalopathy.


Fevers.


Metabolic acidosis.


Diffuse pulmonary infiltrate.


Pleural effusion.


Pancreatitis, necrotizing.


Gallstone.


Leukocytosis.


Lymphopenia.


Electrolytes imbalances.


Hyperglycemia.


DM.


HTN.


HLD.


Anemia.


Abnormal CXR.


Obesity.


Ascites and pleural effusion


Going to surgery today


Plan


Keppra if she has further seizures.


Treat medical diseases.


Subjective


none


Objective





Vital Signs








  Date Time  Temp Pulse Resp B/P (MAP) Pulse Ox O2 Delivery O2 Flow Rate FiO2


 


4/27/20 11:00  76 24 155/76 (102) 99 Ventilator  


 


4/27/20 08:00 100.2       





 100.2       














Intake and Output 


 


 4/27/20





 07:00


 


Intake Total 2750 ml


 


Output Total 3075 ml


 


Balance -325 ml


 


 


 


Intake Oral 0 ml


 


IV Total 2750 ml


 


Output Urine Total 3075 ml








PHYSICAL EXAM


Alert.  Tracheostomy in place. Mouths replies. Tracks with her eyes, moves 

extremities to commands


PERRL.


EOMI.


CN: no focal findings.


Muscle tone: normal.


Muscle strength: Moves all extremities, 3/5 strength in arms, 2/5 and legs


DTR: 1+


Plantar reflex: Silent


Gait: not examined in bed.


Sensory exam: no abnormal findings.


No cerebellar signs elicited.


Review of Relevant


I have reviewed the following items josy (where applicable) has been applied.


Labs





Laboratory Tests








Test


 4/25/20


12:02 4/25/20


17:54 4/26/20


00:25 4/26/20


05:45


 


Glucose (Fingerstick)


 142 mg/dL


(70-99) 128 mg/dL


(70-99) 129 mg/dL


(70-99) 137 mg/dL


(70-99)


 


Test


 4/26/20


06:00 4/26/20


12:09 4/26/20


17:28 4/27/20


00:41


 


White Blood Count


 9.3 x10^3/uL


(4.0-11.0) 


 


 





 


Red Blood Count


 2.60 x10^6/uL


(3.50-5.40) 


 


 





 


Hemoglobin


 7.7 g/dL


(12.0-15.5) 


 


 





 


Hematocrit


 23.6 %


(36.0-47.0) 


 


 





 


Mean Corpuscular Volume 91 fL ()    


 


Mean Corpuscular Hemoglobin 30 pg (25-35)    


 


Mean Corpuscular Hemoglobin


Concent 33 g/dL


(31-37) 


 


 





 


Red Cell Distribution Width


 18.3 %


(11.5-14.5) 


 


 





 


Platelet Count


 286 x10^3/uL


(140-400) 


 


 





 


Neutrophils (%) (Auto) 79 % (31-73)    


 


Lymphocytes (%) (Auto) 14 % (24-48)    


 


Monocytes (%) (Auto) 6 % (0-9)    


 


Eosinophils (%) (Auto) 1 % (0-3)    


 


Basophils (%) (Auto) 0 % (0-3)    


 


Neutrophils # (Auto)


 7.4 x10^3/uL


(1.8-7.7) 


 


 





 


Lymphocytes # (Auto)


 1.2 x10^3/uL


(1.0-4.8) 


 


 





 


Monocytes # (Auto)


 0.6 x10^3/uL


(0.0-1.1) 


 


 





 


Eosinophils # (Auto)


 0.1 x10^3/uL


(0.0-0.7) 


 


 





 


Basophils # (Auto)


 0.0 x10^3/uL


(0.0-0.2) 


 


 





 


Sodium Level


 149 mmol/L


(136-145) 


 


 





 


Potassium Level


 3.5 mmol/L


(3.5-5.1) 


 


 





 


Chloride Level


 110 mmol/L


() 


 


 





 


Carbon Dioxide Level


 28 mmol/L


(21-32) 


 


 





 


Anion Gap 11 (6-14)    


 


Blood Urea Nitrogen


 60 mg/dL


(7-20) 


 


 





 


Creatinine


 1.0 mg/dL


(0.6-1.0) 


 


 





 


Estimated GFR


(Cockcroft-Gault) 58.9 


 


 


 





 


Glucose Level


 142 mg/dL


(70-99) 


 


 





 


Calcium Level


 8.6 mg/dL


(8.5-10.1) 


 


 





 


Glucose (Fingerstick)


 


 148 mg/dL


(70-99) 144 mg/dL


(70-99) 134 mg/dL


(70-99)


 


Test


 4/27/20


04:30 


 


 





 


White Blood Count


 9.2 x10^3/uL


(4.0-11.0) 


 


 





 


Red Blood Count


 2.72 x10^6/uL


(3.50-5.40) 


 


 





 


Hemoglobin


 8.2 g/dL


(12.0-15.5) 


 


 





 


Hematocrit


 24.6 %


(36.0-47.0) 


 


 





 


Mean Corpuscular Volume 90 fL ()    


 


Mean Corpuscular Hemoglobin 30 pg (25-35)    


 


Mean Corpuscular Hemoglobin


Concent 33 g/dL


(31-37) 


 


 





 


Red Cell Distribution Width


 18.4 %


(11.5-14.5) 


 


 





 


Platelet Count


 323 x10^3/uL


(140-400) 


 


 





 


Neutrophils (%) (Auto) 78 % (31-73)    


 


Lymphocytes (%) (Auto) 15 % (24-48)    


 


Monocytes (%) (Auto) 6 % (0-9)    


 


Eosinophils (%) (Auto) 1 % (0-3)    


 


Basophils (%) (Auto) 0 % (0-3)    


 


Neutrophils # (Auto)


 7.1 x10^3/uL


(1.8-7.7) 


 


 





 


Lymphocytes # (Auto)


 1.4 x10^3/uL


(1.0-4.8) 


 


 





 


Monocytes # (Auto)


 0.5 x10^3/uL


(0.0-1.1) 


 


 





 


Eosinophils # (Auto)


 0.1 x10^3/uL


(0.0-0.7) 


 


 





 


Basophils # (Auto)


 0.0 x10^3/uL


(0.0-0.2) 


 


 





 


Sodium Level


 149 mmol/L


(136-145) 


 


 





 


Potassium Level


 3.6 mmol/L


(3.5-5.1) 


 


 





 


Chloride Level


 111 mmol/L


() 


 


 





 


Carbon Dioxide Level


 30 mmol/L


(21-32) 


 


 





 


Anion Gap 8 (6-14)    


 


Blood Urea Nitrogen


 57 mg/dL


(7-20) 


 


 





 


Creatinine


 1.1 mg/dL


(0.6-1.0) 


 


 





 


Estimated GFR


(Cockcroft-Gault) 52.8 


 


 


 





 


Glucose Level


 123 mg/dL


(70-99) 


 


 





 


Calcium Level


 8.5 mg/dL


(8.5-10.1) 


 


 





 


Creatine Kinase


 24 U/L


() 


 


 











Laboratory Tests








Test


 4/26/20


12:09 4/26/20


17:28 4/27/20


00:41 4/27/20


04:30


 


Glucose (Fingerstick)


 148 mg/dL


(70-99) 144 mg/dL


(70-99) 134 mg/dL


(70-99) 





 


White Blood Count


 


 


 


 9.2 x10^3/uL


(4.0-11.0)


 


Red Blood Count


 


 


 


 2.72 x10^6/uL


(3.50-5.40)


 


Hemoglobin


 


 


 


 8.2 g/dL


(12.0-15.5)


 


Hematocrit


 


 


 


 24.6 %


(36.0-47.0)


 


Mean Corpuscular Volume    90 fL () 


 


Mean Corpuscular Hemoglobin    30 pg (25-35) 


 


Mean Corpuscular Hemoglobin


Concent 


 


 


 33 g/dL


(31-37)


 


Red Cell Distribution Width


 


 


 


 18.4 %


(11.5-14.5)


 


Platelet Count


 


 


 


 323 x10^3/uL


(140-400)


 


Neutrophils (%) (Auto)    78 % (31-73) 


 


Lymphocytes (%) (Auto)    15 % (24-48) 


 


Monocytes (%) (Auto)    6 % (0-9) 


 


Eosinophils (%) (Auto)    1 % (0-3) 


 


Basophils (%) (Auto)    0 % (0-3) 


 


Neutrophils # (Auto)


 


 


 


 7.1 x10^3/uL


(1.8-7.7)


 


Lymphocytes # (Auto)


 


 


 


 1.4 x10^3/uL


(1.0-4.8)


 


Monocytes # (Auto)


 


 


 


 0.5 x10^3/uL


(0.0-1.1)


 


Eosinophils # (Auto)


 


 


 


 0.1 x10^3/uL


(0.0-0.7)


 


Basophils # (Auto)


 


 


 


 0.0 x10^3/uL


(0.0-0.2)


 


Sodium Level


 


 


 


 149 mmol/L


(136-145)


 


Potassium Level


 


 


 


 3.6 mmol/L


(3.5-5.1)


 


Chloride Level


 


 


 


 111 mmol/L


()


 


Carbon Dioxide Level


 


 


 


 30 mmol/L


(21-32)


 


Anion Gap    8 (6-14) 


 


Blood Urea Nitrogen


 


 


 


 57 mg/dL


(7-20)


 


Creatinine


 


 


 


 1.1 mg/dL


(0.6-1.0)


 


Estimated GFR


(Cockcroft-Gault) 


 


 


 52.8 





 


Glucose Level


 


 


 


 123 mg/dL


(70-99)


 


Calcium Level


 


 


 


 8.5 mg/dL


(8.5-10.1)


 


Creatine Kinase


 


 


 


 24 U/L


()








Microbiology


4/15/20 Aerobic and Anaerobic Culture - Final, Complete


          


4/15/20 Anaerobic Culture Result 1 ([facility]) - Final, Complete


          


4/15/20 Aerobic Culture - Final, Complete


          


4/15/20 Aerobic Culture Result 1 ([facility]) - Final, Complete


          


4/15/20 Gram Stain - Final, Complete


          


4/15/20 Gram Stain Result 1 ([facility]) - Final, Complete


          


4/15/20 Gram Stain Result 2 ([facility]) - Final, Complete


          


4/14/20 Blood Culture - Final, Complete


          NO GROWTH AFTER 5 DAYS


4/12/20 Urine Culture - Final, Complete


          


4/12/20 Urine Culture Result 1 ([facility]) - Final, Complete


Medications





Current Medications


Sodium Chloride 1,000 ml @  1,000 mls/hr Q1H IV  Last administered on 3/16/20at 

03:00;  Start 3/16/20 at 03:00;  Stop 3/16/20 at 03:59;  Status DC


Ondansetron HCl (Zofran) 4 mg 1X  ONCE IVP  Last administered on 3/16/20at 03:2

7;  Start 3/16/20 at 03:00;  Stop 3/16/20 at 03:01;  Status DC


Morphine Sulfate (Morphine Sulfate) 4 mg 1X  ONCE IV ;  Start 3/16/20 at 03:00; 

Stop 3/16/20 at 03:01;  Status Cancel


Ketorolac Tromethamine (Toradol 30mg Vial) 30 mg 1X  ONCE IV  Last administered 

on 3/16/20at 02:54;  Start 3/16/20 at 03:00;  Stop 3/16/20 at 03:01;  Status DC


Fentanyl Citrate (Fentanyl 2ml Vial) 25 mcg 1X  ONCE IVP  Last administered on 

3/16/20at 03:23;  Start 3/16/20 at 03:30;  Stop 3/16/20 at 03:31;  Status DC


Fentanyl Citrate (Fentanyl 2ml Vial) 100 mcg STK-MED ONCE .ROUTE ;  Start 

3/16/20 at 03:18;  Stop 3/16/20 at 03:18;  Status DC


Iohexol (Omnipaque 350 Mg/ml) 90 ml 1X  ONCE IV  Last administered on 3/16/20at 

03:25;  Start 3/16/20 at 03:30;  Stop 3/16/20 at 03:31;  Status DC


Info (CONTRAST GIVEN -- Rx MONITORING) 1 each PRN DAILY  PRN MC SEE COMMENTS;  

Start 3/16/20 at 03:30;  Stop 3/18/20 at 03:29;  Status DC


Hydromorphone HCl (Dilaudid) 0.5 mg 1X  ONCE IV  Last administered on 3/16/20at 

03:55;  Start 3/16/20 at 04:30;  Stop 3/16/20 at 04:32;  Status DC


Ondansetron HCl (Zofran) 4 mg PRN Q8HRS  PRN IV NAUSEA/VOMITING 1ST CHOICE;  

Start 3/16/20 at 05:00;  Stop 3/16/20 at 09:27;  Status DC


Morphine Sulfate (Morphine Sulfate) 2 mg PRN Q2HR  PRN IV SEVERE PAIN 7-10 Last 

administered on 3/17/20at 12:26;  Start 3/16/20 at 05:00;  Stop 3/17/20 at 

14:15;  Status DC


Sodium Chloride 1,000 ml @  125 mls/hr Q8H IV  Last administered on 3/16/20at 

20:56;  Start 3/16/20 at 05:00;  Stop 3/17/20 at 04:59;  Status DC


Hydromorphone HCl (Dilaudid) 0.5 mg PRN Q3HRS  PRN IV SEVERE PAIN 7-10 Last 

administered on 3/17/20at 10:06;  Start 3/16/20 at 05:00;  Stop 3/17/20 at 12

:01;  Status DC


Piperacillin Sod/ Tazobactam Sod 4.5 gm/Sodium Chloride 100 ml @  200 mls/hr 1X 

ONCE IV  Last administered on 3/16/20at 05:44;  Start 3/16/20 at 06:00;  Stop 

3/16/20 at 06:29;  Status DC


Ondansetron HCl (Zofran) 4 mg PRN Q4HRS  PRN IV NAUSEA/VOMITING 1ST CHOICE Last 

administered on 4/24/20at 11:01;  Start 3/16/20 at 09:30


Insulin Human Lispro (HumaLOG) 0-9 UNITS Q6HRS SQ  Last administered on 

4/24/20at 13:19;  Start 3/16/20 at 09:30


Dextrose (Dextrose 50%-Water Syringe) 12.5 gm PRN Q15MIN  PRN IV SEE COMMENTS;  

Start 3/16/20 at 09:30


Pantoprazole Sodium (PROTONIX VIAL for IV PUSH) 40 mg DAILYAC IVP  Last 

administered on 4/27/20at 08:08;  Start 3/16/20 at 11:30


Prochlorperazine Edisylate (Compazine) 10 mg PRN Q6HRS  PRN IV NAUSEA/VOMITING, 

2nd CHOICE Last administered on 4/24/20at 08:42;  Start 3/16/20 at 17:45


Atenolol (Tenormin) 100 mg DAILY PO ;  Start 3/17/20 at 09:00;  Stop 3/16/20 at 

20:08;  Status DC


Metoprolol Tartrate (Lopressor Vial) 2.5 mg Q6HRS IVP  Last administered on 

3/17/20at 05:51;  Start 3/16/20 at 20:15;  Stop 3/17/20 at 10:02;  Status DC


Metoprolol Tartrate (Lopressor Vial) 5 mg Q6HRS IVP  Last administered on 

3/26/20at 00:12;  Start 3/17/20 at 10:15;  Stop 3/28/20 at 08:48;  Status DC


Hydromorphone HCl (Dilaudid) 1 mg PRN Q3HRS  PRN IV SEVERE PAIN 7-10 Last 

administered on 3/23/20at 05:13;  Start 3/17/20 at 12:00;  Stop 3/31/20 at 

00:25;  Status DC


Lidocaine HCl (Buffered Lidocaine 1%) 3 ml STK-MED ONCE .ROUTE ;  Start 3/17/20 

at 12:55;  Stop 3/17/20 at 12:56;  Status DC


Albumin Human 500 ml @  125 mls/hr 1X  ONCE IV  Last administered on 3/17/20at 

14:33;  Start 3/17/20 at 14:30;  Stop 3/17/20 at 18:32;  Status DC


Norepinephrine Bitartrate 8 mg/ Dextrose 258 ml @  17.299 mls/ hr CONT  PRN IV 

PER PROTOCOL Last administered on 4/14/20at 12:48;  Start 3/17/20 at 15:30;  

Stop 4/17/20 at 09:19;  Status DC


Sodium Chloride 1,000 ml @  125 mls/hr Q8H IV  Last administered on 3/17/20at 

21:04;  Start 3/17/20 at 16:00;  Stop 3/18/20 at 02:42;  Status DC


Albumin Human 500 ml @  125 mls/hr PRN BID  PRN IV After every 2L NSS & BP < 

90mm Last administered on 4/1/20at 14:21;  Start 3/17/20 at 16:00


Iohexol (Omnipaque 300 Mg/ml) 60 ml 1X  ONCE IV  Last administered on 3/17/20at 

17:20;  Start 3/17/20 at 17:00;  Stop 3/17/20 at 17:01;  Status DC


Info (CONTRAST GIVEN -- Rx MONITORING) 1 each PRN DAILY  PRN MC SEE COMMENTS;  

Start 3/17/20 at 17:00;  Stop 3/19/20 at 16:59;  Status DC


Meropenem 1 gm/ Sodium Chloride 100 ml @  200 mls/hr Q8HRS IV  Last administered

on 3/18/20at 05:45;  Start 3/17/20 at 20:00;  Stop 3/18/20 at 08:48;  Status DC


Furosemide (Lasix) 40 mg 1X  ONCE IVP  Last administered on 3/17/20at 22:12;  

Start 3/17/20 at 22:30;  Stop 3/17/20 at 22:31;  Status DC


Calcium Chloride 1000 mg/Sodium Chloride 110 ml @  220 mls/hr 1X  ONCE IV  Last 

administered on 3/17/20at 22:11;  Start 3/17/20 at 22:30;  Stop 3/17/20 at 

22:59;  Status DC


Albuterol Sulfate (Ventolin Neb Soln) 2.5 mg 1X  ONCE NEB  Last administered on 

3/18/20at 00:56;  Start 3/17/20 at 22:30;  Stop 3/17/20 at 22:31;  Status DC


Insulin Human Regular (HumuLIN R VIAL) 5 unit 1X  ONCE IV  Last administered on 

3/17/20at 22:14;  Start 3/17/20 at 22:30;  Stop 3/17/20 at 22:31;  Status DC


Magnesium Sulfate 50 ml @ 25 mls/hr 1X  ONCE IV  Last administered on 3/18/20at 

02:57;  Start 3/18/20 at 03:00;  Stop 3/18/20 at 04:59;  Status DC


Calcium Gluconate 1000 mg/Sodium Chloride 110 ml @  220 mls/hr 1X  ONCE IV  Last

administered on 3/18/20at 02:46;  Start 3/18/20 at 03:00;  Stop 3/18/20 at 

03:29;  Status DC


Sodium Chloride 1,000 ml @  200 mls/hr Q5H IV  Last administered on 3/18/20at 

02:46;  Start 3/18/20 at 03:00;  Stop 3/18/20 at 10:21;  Status DC


Calcium Gluconate 1000 mg/Sodium Chloride 110 ml @  220 mls/hr 1X  ONCE IV  Last

administered on 3/18/20at 03:21;  Start 3/18/20 at 03:30;  Stop 3/18/20 at 

03:59;  Status DC


Sodium Bicarbonate 50 meq/Sodium Chloride 1,050 ml @  75 mls/hr Q14H IV  Last 

administered on 3/22/20at 21:10;  Start 3/18/20 at 07:30;  Stop 3/23/20 at 

10:28;  Status DC


Calcium Gluconate 2000 mg/Sodium Chloride 120 ml @  220 mls/hr 1X  ONCE IV  Last

administered on 3/18/20at 09:05;  Start 3/18/20 at 07:30;  Stop 3/18/20 at 

08:02;  Status DC


Lidocaine HCl (Xylocaine-Mpf 1% 2ml Vial) 2 ml STK-MED ONCE .ROUTE ;  Start 

3/18/20 at 08:47;  Stop 3/18/20 at 08:47;  Status DC


Meropenem 500 mg/ Sodium Chloride 50 ml @  100 mls/hr Q12HR IV  Last 

administered on 3/23/20at 21:01;  Start 3/18/20 at 18:00;  Stop 3/24/20 at 

07:58;  Status DC


Lidocaine HCl (Buffered Lidocaine 1%) 3 ml STK-MED ONCE .ROUTE ;  Start 3/18/20 

at 09:46;  Stop 3/18/20 at 09:46;  Status DC


Lidocaine HCl (Buffered Lidocaine 1%) 6 ml 1X  ONCE INJ  Last administered on 

3/18/20at 10:26;  Start 3/18/20 at 10:15;  Stop 3/18/20 at 10:16;  Status DC


Info (Tpn Per Pharmacy) 1 each PRN DAILY  PRN MC SEE COMMENTS Last administered 

on 4/26/20at 13:34;  Start 3/18/20 at 12:00


Sodium Chloride 1,000 ml @  1,000 mls/hr Q1H PRN IV hypotension;  Start 3/18/20 

at 12:07;  Stop 3/18/20 at 18:06;  Status DC


Diphenhydramine HCl (Benadryl) 25 mg 1X PRN  PRN IV ITCHING;  Start 3/18/20 at 

12:15;  Stop 3/19/20 at 12:14;  Status DC


Diphenhydramine HCl (Benadryl) 25 mg 1X PRN  PRN IV ITCHING;  Start 3/18/20 at 

12:15;  Stop 3/19/20 at 12:14;  Status DC


Sodium Chloride 1,000 ml @  400 mls/hr Q2H30M PRN IV PATENCY;  Start 3/18/20 at 

12:07;  Stop 3/19/20 at 00:06;  Status DC


Info (PHARMACY MONITORING -- do not chart) 1 each PRN DAILY  PRN MC SEE 

COMMENTS;  Start 3/18/20 at 12:15;  Stop 3/20/20 at 08:13;  Status DC


Sodium Chloride 90 meq/Calcium Gluconate 10 meq/ Multivitamins 10 ml/Chromium/ 

Copper/Manganese/ Seleni/Zn 1 ml/ Total Parenteral Nutrition/Amino 

Acids/Dextrose/ Fat Emulsion Intravenous 55.005 ml  @ 2.292 mls/hr TPN  CONT IV 

;  Start 3/18/20 at 22:00;  Stop 3/18/20 at 12:33;  Status DC


Info (Tpn Per Pharmacy) 1 each PRN DAILY  PRN MC SEE COMMENTS;  Start 3/18/20 at

12:30;  Status UNV


Sodium Chloride 90 meq/Calcium Gluconate 10 meq/ Multivitamins 10 ml/Chromium/ 

Copper/Manganese/ Seleni/Zn 0.5 ml/ Total Parenteral Nutrition/Amino 

Acids/Dextrose/ Fat Emulsion Intravenous 1,512 ml @  63 mls/hr TPN  CONT IV  

Last administered on 3/18/20at 22:06;  Start 3/18/20 at 22:00;  Stop 3/19/20 at 

21:59;  Status DC


Calcium Carbonate/ Glycine (Tums) 500 mg PRN AFTMEALHC  PRN PO INDIGESTION;  

Start 3/18/20 at 17:45


Calcium Gluconate (Calcium Gluconate) 2,000 mg 1X  ONCE IVP  Last administered 

on 3/19/20at 02:19;  Start 3/19/20 at 02:15;  Stop 3/19/20 at 02:16;  Status DC


Calcium Chloride 3000 mg/Sodium Chloride 1,030 ml @  50 mls/hr [id number] IV  Last 

administered on 3/21/20at 02:17;  Start 3/19/20 at 08:00;  Stop 3/21/20 at 

15:23;  Status DC


Lorazepam (Ativan Inj) 1 mg PRN Q4HRS  PRN IVP ANXIETY / AGITATION, 2nd choic 

Last administered on 4/17/20at 03:51;  Start 3/19/20 at 09:00;  Stop 4/17/20 at 

09:19;  Status DC


Sodium Chloride 1,000 ml @  1,000 mls/hr Q1H PRN IV hypotension;  Start 3/19/20 

at 08:56;  Stop 3/19/20 at 14:55;  Status DC


Albumin Human 200 ml @  200 mls/hr 1X PRN  PRN IV Hypotension;  Start 3/19/20 at

09:00;  Stop 3/19/20 at 14:59;  Status DC


Diphenhydramine HCl (Benadryl) 25 mg 1X PRN  PRN IV ITCHING;  Start 3/19/20 at 

09:00;  Stop 3/20/20 at 08:59;  Status DC


Diphenhydramine HCl (Benadryl) 25 mg 1X PRN  PRN IV ITCHING;  Start 3/19/20 at 

09:00;  Stop 3/20/20 at 08:59;  Status DC


Sodium Chloride 1,000 ml @  400 mls/hr Q2H30M PRN IV PATENCY;  Start 3/19/20 at 

08:56;  Stop 3/19/20 at 20:55;  Status DC


Info (PHARMACY MONITORING -- do not chart) 1 each PRN DAILY  PRN MC SEE 

COMMENTS;  Start 3/19/20 at 09:00;  Status UNV


Info (PHARMACY MONITORING -- do not chart) 1 each PRN DAILY  PRN MC SEE 

COMMENTS;  Start 3/19/20 at 09:00;  Stop 3/20/20 at 08:13;  Status DC


Digoxin (Lanoxin) 500 mcg 1X  ONCE IV  Last administered on 3/19/20at 10:04;  

Start 3/19/20 at 10:00;  Stop 3/19/20 at 10:01;  Status DC


Digoxin (Lanoxin) 125 mcg 1X  ONCE IV  Last administered on 3/19/20at 17:10;  

Start 3/19/20 at 18:00;  Stop 3/19/20 at 18:01;  Status DC


Magnesium Sulfate 100 ml @  25 mls/hr 1X  ONCE IV  Last administered on 

3/19/20at 12:48;  Start 3/19/20 at 13:00;  Stop 3/19/20 at 16:59;  Status DC


Sodium Chloride 90 meq/Magnesium Sulfate 10 meq/ Calcium Gluconate 20 meq/ 

Multivitamins 10 ml/Chromium/ Copper/Manganese/ Seleni/Zn 0.5 ml/ Total 

Parenteral Nutrition/Amino Acids/Dextrose/ Fat Emulsion Intravenous 1,512 ml @  

63 mls/hr TPN  CONT IV  Last administered on 3/19/20at 22:25;  Start 3/19/20 at 

22:00;  Stop 3/20/20 at 21:59;  Status DC


Sodium Chloride 1,000 ml @  1,000 mls/hr Q1H PRN IV hypotension;  Start 3/20/20 

at 08:05;  Stop 3/20/20 at 14:04;  Status DC


Albumin Human 200 ml @  200 mls/hr 1X  ONCE IV  Last administered on 3/20/20at 

08:57;  Start 3/20/20 at 08:15;  Stop 3/20/20 at 09:14;  Status DC


Diphenhydramine HCl (Benadryl) 25 mg 1X PRN  PRN IV ITCHING;  Start 3/20/20 at 

08:15;  Stop 3/21/20 at 08:14;  Status DC


Diphenhydramine HCl (Benadryl) 25 mg 1X PRN  PRN IV ITCHING;  Start 3/20/20 at 

08:15;  Stop 3/21/20 at 08:14;  Status DC


Sodium Chloride 1,000 ml @  400 mls/hr Q2H30M PRN IV PATENCY;  Start 3/20/20 at 

08:05;  Stop 3/20/20 at 20:04;  Status DC


Info (PHARMACY MONITORING -- do not chart) 1 each PRN DAILY  PRN MC SEE COMMENT

S;  Start 3/20/20 at 08:15;  Stop 3/24/20 at 07:57;  Status DC


Sodium Chloride 90 meq/Potassium Chloride 15 meq/ Potassium Phosphate 10 mmol/ 

Magnesium Sulfate 10 meq/Calcium Gluconate 20 meq/ Multivitamins 10 ml/Chromium/

Copper/Manganese/ Seleni/Zn 0.5 ml/ Total Parenteral Nutrition/Amino 

Acids/Dextrose/ Fat Emulsion Intravenous 1,512 ml @  63 mls/hr TPN  CONT IV  

Last administered on 3/20/20at 21:01;  Start 3/20/20 at 22:00;  Stop 3/21/20 at 

21:59;  Status DC


Potassium Chloride/Water 100 ml @  100 mls/hr 1X  ONCE IV  Last administered on 

3/20/20at 14:09;  Start 3/20/20 at 14:00;  Stop 3/20/20 at 14:59;  Status DC


Benzocaine (Hurricaine One) 1 spray 1X  ONCE MM  Last administered on 3/20/20at 

16:38;  Start 3/20/20 at 14:30;  Stop 3/20/20 at 14:31;  Status DC


Lidocaine HCl (Glydo (Lidocaine) Jelly) 1 thomas 1X  ONCE MM  Last administered on 

3/20/20at 16:38;  Start 3/20/20 at 14:30;  Stop 3/20/20 at 14:31;  Status DC


Linezolid/Dextrose 300 ml @  300 mls/hr Q12HR IV  Last administered on 3/26/20at

21:04;  Start 3/20/20 at 20:00;  Stop 3/27/20 at 07:50;  Status DC


Acetaminophen (Tylenol) 650 mg PRN Q6HRS  PRN PO MILD PAIN / TEMP;  Start 3/

21/20 at 03:30;  Stop 3/21/20 at 03:36;  Status DC


Acetaminophen (Tylenol) 650 mg PRN Q6HRS  PRN PEG MILD PAIN / TEMP Last admin

istered on 4/16/20at 19:56;  Start 3/21/20 at 03:36


Sodium Chloride 1,000 ml @  1,000 mls/hr Q1H PRN IV hypotension;  Start 3/21/20 

at 07:50;  Stop 3/21/20 at 13:49;  Status DC


Albumin Human 200 ml @  200 mls/hr 1X PRN  PRN IV Hypotension;  Start 3/21/20 at

08:00;  Stop 3/21/20 at 13:59;  Status DC


Sodium Chloride (Normal Saline Flush) 10 ml 1X PRN  PRN IV AP catheter pack;  

Start 3/21/20 at 08:00;  Stop 3/22/20 at 07:59;  Status DC


Sodium Chloride (Normal Saline Flush) 10 ml 1X PRN  PRN IV  catheter pack;  

Start 3/21/20 at 08:00;  Stop 3/22/20 at 07:59;  Status DC


Sodium Chloride 1,000 ml @  400 mls/hr Q2H30M PRN IV PATENCY;  Start 3/21/20 at 

07:50;  Stop 3/21/20 at 19:49;  Status DC


Info (PHARMACY MONITORING -- do not chart) 1 each PRN DAILY  PRN MC SEE 

COMMENTS;  Start 3/21/20 at 08:00;  Status UNV


Info (PHARMACY MONITORING -- do not chart) 1 each PRN DAILY  PRN MC SEE 

COMMENTS;  Start 3/21/20 at 08:00;  Stop 3/23/20 at 08:25;  Status DC


Sodium Chloride 90 meq/Potassium Chloride 15 meq/ Potassium Phosphate 10 mmol/ 

Magnesium Sulfate 10 meq/Calcium Gluconate 20 meq/ Multivitamins 10 ml/Chromium/

Copper/Manganese/ Seleni/Zn 0.5 ml/ Total Parenteral Nutrition/Amino 

Acids/Dextrose/ Fat Emulsion Intravenous 1,512 ml @  63 mls/hr TPN  CONT IV  

Last administered on 3/21/20at 20:57;  Start 3/21/20 at 22:00;  Stop 3/22/20 at 

21:59;  Status DC


Sodium Chloride 90 meq/Potassium Chloride 15 meq/ Potassium Phosphate 15 mmol/ 

Magnesium Sulfate 10 meq/Calcium Gluconate 20 meq/ Multivitamins 10 ml/Chromium/

Copper/Manganese/ Seleni/Zn 0.5 ml/ Total Parenteral Nutrition/Amino 

Acids/Dextrose/ Fat Emulsion Intravenous 1,512 ml @  63 mls/hr TPN  CONT IV ;  

Start 3/22/20 at 22:00;  Stop 3/22/20 at 14:16;  Status DC


Sodium Chloride 90 meq/Potassium Chloride 15 meq/ Potassium Phosphate 15 mmol/ 

Magnesium Sulfate 10 meq/Calcium Gluconate 20 meq/ Multivitamins 10 ml/Chromium/

Copper/Manganese/ Seleni/Zn 0.5 ml/ Total Parenteral Nutrition/Amino 

Acids/Dextrose/ Fat Emulsion Intravenous 1,200 ml @  50 mls/hr TPN  CONT IV ;  

Start 3/22/20 at 22:00;  Stop 3/22/20 at 14:17;  Status DC


Sodium Chloride 90 meq/Potassium Chloride 15 meq/ Potassium Phosphate 10 mmol/ 

Magnesium Sulfate 10 meq/Calcium Gluconate 20 meq/ Multivitamins 10 ml/Chromium/

Copper/Manganese/ Seleni/Zn 0.5 ml/ Total Parenteral Nutrition/Amino 

Acids/Dextrose/ Fat Emulsion Intravenous 1,200 ml @  50 mls/hr TPN  CONT IV  

Last administered on 3/22/20at 23:29;  Start 3/22/20 at 22:00;  Stop 3/23/20 at 

21:59;  Status DC


Sodium Chloride 1,000 ml @  1,000 mls/hr Q1H PRN IV hypotension;  Start 3/23/20 

at 07:28;  Stop 3/23/20 at 13:27;  Status DC


Albumin Human 200 ml @  200 mls/hr 1X  ONCE IV  Last administered on 3/23/20at 

08:51;  Start 3/23/20 at 07:30;  Stop 3/23/20 at 08:29;  Status DC


Diphenhydramine HCl (Benadryl) 25 mg 1X PRN  PRN IV ITCHING;  Start 3/23/20 at 

07:30;  Stop 3/24/20 at 07:29;  Status DC


Diphenhydramine HCl (Benadryl) 25 mg 1X PRN  PRN IV ITCHING;  Start 3/23/20 at 

07:30;  Stop 3/24/20 at 07:29;  Status DC


Sodium Chloride 1,000 ml @  400 mls/hr Q2H30M PRN IV PATENCY;  Start 3/23/20 at 

07:28;  Stop 3/23/20 at 19:27;  Status DC


Info (PHARMACY MONITORING -- do not chart) 1 each PRN DAILY  PRN MC SEE 

COMMENTS;  Start 3/23/20 at 07:30;  Stop 4/3/20 at 13:01;  Status DC


Metronidazole 100 ml @  100 mls/hr Q6HRS IV  Last administered on 4/8/20at 

06:26;  Start 3/23/20 at 08:30;  Stop 4/8/20 at 09:58;  Status DC


Micafungin Sodium 100 mg/Dextrose 100 ml @  100 mls/hr Q24H IV  Last 

administered on 4/27/20at 08:09;  Start 3/23/20 at 09:00


Propofol 0 ml @ As Directed STK-MED ONCE IV ;  Start 3/23/20 at 07:53;  Stop 

3/23/20 at 07:53;  Status DC


Etomidate (Amidate) 20 mg STK-MED ONCE IV ;  Start 3/23/20 at 07:53;  Stop 

3/23/20 at 07:54;  Status DC


Midazolam HCl (Versed) 5 mg STK-MED ONCE .ROUTE ;  Start 3/23/20 at 07:57;  Stop

3/23/20 at 07:57;  Status DC


Fentanyl Citrate 30 ml @ 0 mls/hr CONT  PRN IV SEE PROTOCOL Last administered on

4/17/20at 06:12;  Start 3/23/20 at 08:15;  Stop 4/17/20 at 09:19;  Status DC


Artificial Tears (Artificial Tears) 1 drop PRN Q1HR  PRN OU DRY EYE, 1st choice;

 Start 3/23/20 at 08:15


Midazolam HCl 50 mg/Sodium Chloride 50 ml @ 0 mls/hr CONT  PRN IV SEE PROTOCOL 

Last administered on 3/26/20at 22:39;  Start 3/23/20 at 08:15;  Stop 3/28/20 at 

15:59;  Status DC


Etomidate (Amidate) 8 mg 1X  ONCE IV  Last administered on 3/23/20at 08:33;  

Start 3/23/20 at 08:30;  Stop 3/23/20 at 08:31;  Status DC


Succinylcholine Chloride (Anectine) 120 mg 1X  ONCE IV  Last administered on 

3/23/20at 08:34;  Start 3/23/20 at 08:30;  Stop 3/23/20 at 08:31;  Status DC


Midazolam HCl (Versed) 5 mg 1X  ONCE IV ;  Start 3/23/20 at 08:30;  Stop 3/23/20

at 08:31;  Status DC


Potassium Chloride 15 meq/ Bicarbonate Dialysis Soln w/ out KCl 5,007.5 ml  @ 

1,000 mls/ hr Q5H1M IV  Last administered on 3/24/20at 11:11;  Start 3/23/20 at 

12:00;  Stop 3/24/20 at 11:15;  Status DC


Potassium Chloride 15 meq/ Bicarbonate Dialysis Soln w/ out KCl 5,007.5 ml  @ 

1,000 mls/ hr Q5H1M IV  Last administered on 3/24/20at 11:12;  Start 3/23/20 at 

12:00;  Stop 3/24/20 at 11:17;  Status DC


Potassium Chloride 15 meq/ Bicarbonate Dialysis Soln w/ out KCl 5,007.5 ml  @ 

1,000 mls/ hr Q5H1M IV  Last administered on 3/24/20at 11:11;  Start 3/23/20 at 

12:00;  Stop 3/24/20 at 11:19;  Status DC


Sodium Chloride 90 meq/Potassium Chloride 15 meq/ Potassium Phosphate 10 mmol/ 

Magnesium Sulfate 10 meq/Calcium Gluconate 20 meq/ Multivitamins 10 ml/Chromium/

Copper/Manganese/ Seleni/Zn 0.5 ml/ Total Parenteral Nutrition/Amino 

Acids/Dextrose/ Fat Emulsion Intravenous 1,400 ml @  58.333 mls/ hr TPN  CONT IV

 Last administered on 3/23/20at 21:42;  Start 3/23/20 at 22:00;  Stop 3/24/20 at

21:59;  Status DC


Heparin Sodium (Porcine) (Heparin Sodium) 5,000 unit Q8HRS SQ  Last administered

on 3/28/20at 05:55;  Start 3/23/20 at 15:00;  Stop 3/28/20 at 13:28;  Status DC


Meropenem 500 mg/ Sodium Chloride 50 ml @  100 mls/hr Q6HRS IV  Last 

administered on 3/25/20at 06:00;  Start 3/24/20 at 09:00;  Stop 3/25/20 at 

07:29;  Status DC


Potassium Phosphate 20 mmol/ Sodium Chloride 106.6667 ml @  51.667 m... 1X  ONCE

IV  Last administered on 3/24/20at 11:22;  Start 3/24/20 at 10:15;  Stop 3/24/20

at 12:18;  Status DC


Acetaminophen (Tylenol Supp) 650 mg PRN Q6HRS  PRN OH MILD PAIN / TEMP > 100.3'F

Last administered on 4/27/20at 00:32;  Start 3/24/20 at 10:30


Potassium Chloride/Water 100 ml @  100 mls/hr Q1H IV  Last administered on 

3/24/20at 12:12;  Start 3/24/20 at 11:00;  Stop 3/24/20 at 12:59;  Status DC


Potassium Chloride 20 meq/ Bicarbonate Dialysis Soln w/ out KCl 5,010 ml @  

1,000 mls/hr Q5H1M IV  Last administered on 3/25/20at 08:48;  Start 3/24/20 at 

12:00;  Stop 3/25/20 at 13:03;  Status DC


Potassium Chloride 20 meq/ Bicarbonate Dialysis Soln w/ out KCl 5,010 ml @  

1,000 mls/hr Q5H1M IV  Last administered on 3/29/20at 14:52;  Start 3/24/20 at 

11:30;  Stop 3/29/20 at 19:59;  Status DC


Potassium Chloride 20 meq/ Bicarbonate Dialysis Soln w/ out KCl 5,010 ml @  

1,000 mls/hr Q5H1M IV  Last administered on 3/29/20at 14:53;  Start 3/24/20 at 

11:30;  Stop 3/29/20 at 19:59;  Status DC


Sodium Chloride 90 meq/Potassium Chloride 15 meq/ Potassium Phosphate 15 mmol/ 

Magnesium Sulfate 10 meq/Calcium Gluconate 15 meq/ Multivitamins 10 ml/Chromium/

Copper/Manganese/ Seleni/Zn 0.5 ml/ Total Parenteral Nutrition/Amino 

Acids/Dextrose/ Fat Emulsion Intravenous 1,400 ml @  58.333 mls/ hr TPN  CONT IV

 Last administered on 3/24/20at 22:17;  Start 3/24/20 at 22:00;  Stop 3/25/20 at

21:59;  Status DC


Cefepime HCl (Maxipime) 2 gm Q12HR IVP  Last administered on 4/7/20at 20:56;  

Start 3/25/20 at 09:00;  Stop 4/8/20 at 09:58;  Status DC


Daptomycin 500 mg/ Sodium Chloride 50 ml @  100 mls/hr Q48H IV  Last 

administered on 4/10/20at 09:57;  Start 3/25/20 at 08:30;  Stop 4/10/20 at 

10:07;  Status DC


Lidocaine HCl (Buffered Lidocaine 1%) 3 ml 1X  ONCE INJ  Last administered on 

3/25/20at 10:27;  Start 3/25/20 at 10:30;  Stop 3/25/20 at 10:31;  Status DC


Potassium Phosphate 20 mmol/ Sodium Chloride 106.6667 ml @  51.667 m... 1X  ONCE

IV  Last administered on 3/25/20at 12:51;  Start 3/25/20 at 13:00;  Stop 3/25/20

at 15:03;  Status DC


Sodium Chloride 90 meq/Potassium Chloride 15 meq/ Potassium Phosphate 18 mmol/ 

Magnesium Sulfate 8 meq/Calcium Gluconate 15 meq/ Multivitamins 10 ml/Chromium/ 

Copper/Manganese/ Seleni/Zn 0.5 ml/ Total Parenteral Nutrition/Amino 

Acids/Dextrose/ Fat Emulsion Intravenous 1,400 ml @  58.333 mls/ hr TPN  CONT IV

 Last administered on 3/25/20at 22:16;  Start 3/25/20 at 22:00;  Stop 3/26/20 at

21:59;  Status DC


Potassium Chloride 20 meq/ Bicarbonate Dialysis Soln w/ out KCl 5,010 ml @  

1,000 mls/hr Q5H1M IV  Last administered on 3/29/20at 14:54;  Start 3/25/20 at 1

6:00;  Stop 3/29/20 at 19:59;  Status DC


Multi-Ingred Cream/Lotion/Oil/ Oint (Artificial Tears Eye Ointment) 1 thomas PRN 

Q1HR  PRN OU DRY EYE, 2nd choice Last administered on 4/13/20at 08:19;  Start 

3/25/20 at 17:30


Sodium Chloride 90 meq/Potassium Chloride 15 meq/ Potassium Phosphate 18 mmol/ 

Magnesium Sulfate 8 meq/Calcium Gluconate 15 meq/ Multivitamins 10 ml/Chromium/ 

Copper/Manganese/ Seleni/Zn 0.5 ml/ Total Parenteral Nutrition/Amino 

Acids/Dextrose/ Fat Emulsion Intravenous 1,400 ml @  58.333 mls/ hr TPN  CONT IV

 Last administered on 3/26/20at 22:00;  Start 3/26/20 at 22:00;  Stop 3/27/20 at

21:59;  Status DC


Albumin Human 500 ml @  125 mls/hr 1X  ONCE IV ;  Start 3/26/20 at 14:15;  Stop 

3/26/20 at 18:14;  Status DC


Sodium Chloride 90 meq/Potassium Chloride 15 meq/ Potassium Phosphate 18 mmol/ 

Magnesium Sulfate 8 meq/Calcium Gluconate 15 meq/ Multivitamins 10 ml/Chromium/ 

Copper/Manganese/ Seleni/Zn 0.5 ml/ Insulin Human Regular 10 unit/ Total 

Parenteral Nutrition/Amino Acids/Dextrose/ Fat Emulsion Intravenous 1,400 ml @  

58.333 mls/ hr TPN  CONT IV  Last administered on 3/27/20at 21:43;  Start 

3/27/20 at 22:00;  Stop 3/28/20 at 21:59;  Status DC


Lidocaine HCl (Buffered Lidocaine 1%) 3 ml STK-MED ONCE .ROUTE ;  Start 3/25/20 

at 10:00;  Stop 3/27/20 at 13:57;  Status DC


Midazolam HCl 100 mg/Sodium Chloride 100 ml @ 7 mls/hr CONT  PRN IV SEE PROTOCOL

Last administered on 4/8/20at 15:35;  Start 3/28/20 at 16:00


Sodium Chloride 90 meq/Potassium Chloride 15 meq/ Potassium Phosphate 18 mmol/ 

Magnesium Sulfate 8 meq/Calcium Gluconate 15 meq/ Multivitamins 10 ml/Chromium/ 

Copper/Manganese/ Seleni/Zn 0.5 ml/ Insulin Human Regular 15 unit/ Total 

Parenteral Nutrition/Amino Acids/Dextrose/ Fat Emulsion Intravenous 1,400 ml @  

58.333 mls/ hr TPN  CONT IV  Last administered on 3/28/20at 20:34;  Start 

3/28/20 at 22:00;  Stop 3/29/20 at 21:59;  Status DC


Info ([facility] Electrolyte Protocol) 1 ea CONT PRN  PRN MC PER PROTOCOL;  Start 

3/29/20 at 13:15


Sodium Chloride 90 meq/Potassium Chloride 15 meq/ Potassium Phosphate 18 mmol/ M

agnesium Sulfate 8 meq/Calcium Gluconate 15 meq/ Multivitamins 10 ml/Chromium/ 

Copper/Manganese/ Seleni/Zn 0.5 ml/ Insulin Human Regular 15 unit/ Total 

Parenteral Nutrition/Amino Acids/Dextrose/ Fat Emulsion Intravenous 1,400 ml @  

58.333 mls/ hr TPN  CONT IV  Last administered on 3/29/20at 22:05;  Start 

3/29/20 at 22:00;  Stop 3/30/20 at 21:59;  Status DC


Potassium Chloride 15 meq/ Bicarbonate Dialysis Soln w/ out KCl 5,007.5 ml  @ 

1,000 mls/ hr Q5H1M IV  Last administered on 4/1/20at 18:14;  Start 3/29/20 at 

20:00;  Stop 4/2/20 at 13:08;  Status DC


Potassium Chloride 15 meq/ Bicarbonate Dialysis Soln w/ out KCl 5,007.5 ml  @ 

1,000 mls/ hr Q5H1M IV  Last administered on 4/1/20at 18:14;  Start 3/29/20 at 

20:00;  Stop 4/2/20 at 13:08;  Status DC


Potassium Chloride 15 meq/ Bicarbonate Dialysis Soln w/ out KCl 5,007.5 ml  @ 

1,000 mls/ hr Q5H1M IV  Last administered on 4/1/20at 18:14;  Start 3/29/20 at 

20:00;  Stop 4/2/20 at 13:08;  Status DC


Iohexol (Omnipaque 240 Mg/ml) 30 ml 1X  ONCE PO  Last administered on 3/30/20at 

11:30;  Start 3/30/20 at 11:30;  Stop 3/30/20 at 11:33;  Status DC


Info (CONTRAST GIVEN -- Rx MONITORING) 1 each PRN DAILY  PRN MC SEE COMMENTS;  

Start 3/30/20 at 11:45;  Stop 4/1/20 at 11:44;  Status DC


Sodium Chloride 90 meq/Potassium Chloride 15 meq/ Potassium Phosphate 18 mmol/ 

Magnesium Sulfate 8 meq/Calcium Gluconate 15 meq/ Multivitamins 10 ml/Chromium/ 

Copper/Manganese/ Seleni/Zn 0.5 ml/ Insulin Human Regular 15 unit/ Total 

Parenteral Nutrition/Amino Acids/Dextrose/ Fat Emulsion Intravenous 1,400 ml @  

58.333 mls/ hr TPN  CONT IV  Last administered on 3/30/20at 21:47;  Start 

3/30/20 at 22:00;  Stop 3/31/20 at 21:59;  Status DC


Sodium Chloride 90 meq/Potassium Chloride 15 meq/ Potassium Phosphate 18 mmol/ 

Magnesium Sulfate 8 meq/Calcium Gluconate 15 meq/ Multivitamins 10 ml/Chromium/ 

Copper/Manganese/ Seleni/Zn 0.5 ml/ Insulin Human Regular 20 unit/ Total 

Parenteral Nutrition/Amino Acids/Dextrose/ Fat Emulsion Intravenous 1,400 ml @  

58.333 mls/ hr TPN  CONT IV  Last administered on 3/31/20at 21:36;  Start 

3/31/20 at 22:00;  Stop 4/1/20 at 21:59;  Status DC


Alteplase, Recombinant (Cathflo For Central Catheter Clearance) 1 mg 1X  ONCE 

INT CAT  Last administered on 3/31/20at 20:03;  Start 3/31/20 at 19:30;  Stop 

3/31/20 at 19:46;  Status DC


Alteplase, Recombinant (Cathflo For Central Catheter Clearance) 1 mg 1X  ONCE 

INT CAT  Last administered on 3/31/20at 22:05;  Start 3/31/20 at 22:00;  Stop 

3/31/20 at 22:01;  Status DC


Sodium Chloride 90 meq/Potassium Chloride 15 meq/ Potassium Phosphate 18 mmol/ 

Magnesium Sulfate 8 meq/Calcium Gluconate 15 meq/ Multivitamins 10 ml/Chromium/ 

Copper/Manganese/ Seleni/Zn 0.5 ml/ Insulin Human Regular 20 unit/ Total 

Parenteral Nutrition/Amino Acids/Dextrose/ Fat Emulsion Intravenous 1,400 ml @  

58.333 mls/ hr TPN  CONT IV  Last administered on 4/1/20at 21:30;  Start 4/1/20 

at 22:00;  Stop 4/2/20 at 21:59;  Status DC


Dexmedetomidine HCl 400 mcg/ Sodium Chloride 100 ml @ 0 mls/hr CONT  PRN IV 

ANXIETY / AGITATION Last administered on 4/27/20at 10:18;  Start 4/2/20 at 08:15


Sodium Chloride 500 ml @  500 mls/hr 1X PRN  PRN IV ELEVATED BP, SEE COMMENTS;  

Start 4/2/20 at 08:15


Atropine Sulfate (ATROPINE 0.5mg SYRINGE) 0.5 mg PRN Q5MIN  PRN IV SEE COMMENTS;

 Start 4/2/20 at 08:15


Furosemide (Lasix) 20 mg 1X  ONCE IVP  Last administered on 4/2/20at 08:19;  

Start 4/2/20 at 08:15;  Stop 4/2/20 at 08:16;  Status DC


Lidocaine HCl (Buffered Lidocaine 1%) 3 ml STK-MED ONCE .ROUTE ;  Start 4/2/20 

at 08:39;  Stop 4/2/20 at 08:39;  Status DC


Lidocaine HCl (Buffered Lidocaine 1%) 6 ml 1X  ONCE INJ  Last administered on 

4/2/20at 09:05;  Start 4/2/20 at 09:00;  Stop 4/2/20 at 09:06;  Status DC


Sodium Chloride 90 meq/Potassium Chloride 15 meq/ Potassium Phosphate 18 mmol/ 

Magnesium Sulfate 8 meq/Calcium Gluconate 15 meq/ Multivitamins 10 ml/Chromium/ 

Copper/Manganese/ Seleni/Zn 0.5 ml/ Insulin Human Regular 20 unit/ Total 

Parenteral Nutrition/Amino Acids/Dextrose/ Fat Emulsion Intravenous 1,400 ml @  

58.333 mls/ hr TPN  CONT IV  Last administered on 4/2/20at 22:45;  Start 4/2/20 

at 22:00;  Stop 4/3/20 at 21:59;  Status DC


Sodium Chloride 1,000 ml @  1,000 mls/hr Q1H PRN IV hypotension;  Start 4/3/20 

at 07:30;  Stop 4/3/20 at 13:29;  Status DC


Albumin Human 200 ml @  200 mls/hr 1X PRN  PRN IV Hypotension Last administered 

on 4/3/20at 09:36;  Start 4/3/20 at 07:30;  Stop 4/3/20 at 13:29;  Status DC


Sodium Chloride (Normal Saline Flush) 10 ml 1X PRN  PRN IV AP catheter pack;  

Start 4/3/20 at 07:30;  Stop 4/3/20 at 21:29;  Status DC


Sodium Chloride (Normal Saline Flush) 10 ml 1X PRN  PRN IV  catheter pack;  

Start 4/3/20 at 07:30;  Stop 4/4/20 at 07:29;  Status DC


Sodium Chloride 1,000 ml @  400 mls/hr Q2H30M PRN IV PATENCY;  Start 4/3/20 at 

07:30;  Stop 4/3/20 at 19:29;  Status DC


Info (PHARMACY MONITORING -- do not chart) 1 each PRN DAILY  PRN MC SEE 

COMMENTS;  Start 4/3/20 at 07:30;  Stop 4/3/20 at 13:02;  Status DC


Info (PHARMACY MONITORING -- do not chart) 1 each PRN DAILY  PRN MC SEE 

COMMENTS;  Start 4/3/20 at 07:30;  Stop 4/5/20 at 12:45;  Status DC


Sodium Chloride 90 meq/Potassium Chloride 15 meq/ Potassium Phosphate 10 mmol/ 

Magnesium Sulfate 8 meq/Calcium Gluconate 15 meq/ Multivitamins 10 ml/Chromium/ 

Copper/Manganese/ Seleni/Zn 0.5 ml/ Insulin Human Regular 25 unit/ Total 

Parenteral Nutrition/Amino Acids/Dextrose/ Fat Emulsion Intravenous 1,400 ml @  

58.333 mls/ hr TPN  CONT IV  Last administered on 4/3/20at 22:19;  Start 4/3/20 

at 22:00;  Stop 4/4/20 at 21:59;  Status DC


Heparin Sodium (Porcine) (Heparin Sodium) 5,000 unit Q12HR SQ  Last administered

on 4/26/20at 08:59;  Start 4/3/20 at 21:00;  Stop 4/26/20 at 10:05;  Status DC


Ondansetron HCl (Zofran) 4 mg PRN Q6HRS  PRN IV NAUSEA/VOMITING;  Start 4/6/20 

at 07:00;  Stop 4/7/20 at 06:59;  Status DC


Fentanyl Citrate (Fentanyl 2ml Vial) 25 mcg PRN Q5MIN  PRN IV MILD PAIN 1-3;  

Start 4/6/20 at 07:00;  Stop 4/7/20 at 06:59;  Status DC


Fentanyl Citrate (Fentanyl 2ml Vial) 50 mcg PRN Q5MIN  PRN IV MODERATE TO SEVERE

PAIN;  Start 4/6/20 at 07:00;  Stop 4/7/20 at 06:59;  Status DC


Ringer's Solution 1,000 ml @  30 mls/hr Q24H IV ;  Start 4/6/20 at 07:00;  Stop 

4/6/20 at 18:59;  Status DC


Lidocaine HCl (Xylocaine-Mpf 1% 2ml Vial) 2 ml PRN 1X  PRN ID PRIOR TO IV START;

 Start 4/6/20 at 07:00;  Stop 4/7/20 at 06:59;  Status DC


Prochlorperazine Edisylate (Compazine) 5 mg PACU PRN  PRN IV NAUSEA, MRX1;  

Start 4/6/20 at 07:00;  Stop 4/7/20 at 06:59;  Status DC


Sodium Chloride 1,000 ml @  1,000 mls/hr Q1H PRN IV hypotension;  Start 4/4/20 

at 09:10;  Stop 4/4/20 at 15:09;  Status DC


Albumin Human 200 ml @  200 mls/hr 1X PRN  PRN IV Hypotension Last administered 

on 4/4/20at 10:10;  Start 4/4/20 at 09:15;  Stop 4/4/20 at 15:14;  Status DC


Sodium Chloride 1,000 ml @  400 mls/hr Q2H30M PRN IV PATENCY;  Start 4/4/20 at 

09:10;  Stop 4/4/20 at 21:09;  Status DC


Info (PHARMACY MONITORING -- do not chart) 1 each PRN DAILY  PRN MC SEE 

COMMENTS;  Start 4/4/20 at 09:15;  Stop 4/5/20 at 12:45;  Status DC


Info (PHARMACY MONITORING -- do not chart) 1 each PRN DAILY  PRN MC SEE 

COMMENTS;  Start 4/4/20 at 09:15;  Stop 4/5/20 at 12:45;  Status DC


Sodium Chloride 90 meq/Potassium Chloride 15 meq/ Potassium Phosphate 10 mmol/ 

Magnesium Sulfate 8 meq/Calcium Gluconate 15 meq/ Multivitamins 10 ml/Chromium/ 

Copper/Manganese/ Seleni/Zn 0.5 ml/ Insulin Human Regular 25 unit/ Total 

Parenteral Nutrition/Amino Acids/Dextrose/ Fat Emulsion Intravenous 1,400 ml @  

58.333 mls/ hr TPN  CONT IV  Last administered on 4/4/20at 22:10;  Start 4/4/20 

at 22:00;  Stop 4/5/20 at 21:59;  Status DC


Magnesium Sulfate 50 ml @ 25 mls/hr PRN DAILY  PRN IV for Mag < 1.7 on am labs 

Last administered on 4/20/20at 17:27;  Start 4/5/20 at 09:15


Sodium Chloride 90 meq/Potassium Chloride 15 meq/ Potassium Phosphate 10 mmol/ 

Magnesium Sulfate 8 meq/Calcium Gluconate 15 meq/ Multivitamins 10 ml/Chromium/ 

Copper/Manganese/ Seleni/Zn 0.5 ml/ Insulin Human Regular 25 unit/ Total 

Parenteral Nutrition/Amino Acids/Dextrose/ Fat Emulsion Intravenous 1,400 ml @  

58.333 mls/ hr TPN  CONT IV  Last administered on 4/5/20at 21:20;  Start 4/5/20 

at 22:00;  Stop 4/6/20 at 21:59;  Status DC


Sodium Chloride 1,000 ml @  1,000 mls/hr Q1H PRN IV hypotension;  Start 4/5/20 

at 12:23;  Stop 4/5/20 at 18:22;  Status DC


Albumin Human 200 ml @  200 mls/hr 1X  ONCE IV  Last administered on 4/5/20at 

13:34;  Start 4/5/20 at 12:30;  Stop 4/5/20 at 13:29;  Status DC


Diphenhydramine HCl (Benadryl) 25 mg 1X PRN  PRN IV ITCHING;  Start 4/5/20 at 

12:30;  Stop 4/6/20 at 12:29;  Status DC


Diphenhydramine HCl (Benadryl) 25 mg 1X PRN  PRN IV ITCHING;  Start 4/5/20 at 

12:30;  Stop 4/6/20 at 12:29;  Status DC


Info (PHARMACY MONITORING -- do not chart) 1 each PRN DAILY  PRN MC SEE 

COMMENTS;  Start 4/5/20 at 12:30;  Status Cancel


Bupivacaine HCl/ Epinephrine Bitart (Sensorcain-Epi 0.5%-1:[id number] Mpf) 30 ml 

STK-MED ONCE .ROUTE  Last administered on 4/6/20at 11:44;  Start 4/6/20 at 

11:00;  Stop 4/6/20 at 11:01;  Status DC


Cellulose (Surgicel Fibrillar 1x2) 1 each STK-MED ONCE .ROUTE ;  Start 4/6/20 at

11:00;  Stop 4/6/20 at 11:01;  Status DC


Sodium Chloride 90 meq/Potassium Chloride 15 meq/ Potassium Phosphate 10 mmol/ 

Magnesium Sulfate 12 meq/Calcium Gluconate 15 meq/ Multivitamins 10 ml/Chromium/

Copper/Manganese/ Seleni/Zn 0.5 ml/ Insulin Human Regular 25 unit/ Total 

Parenteral Nutrition/Amino Acids/Dextrose/ Fat Emulsion Intravenous 1,400 ml @  

58.333 mls/ hr TPN  CONT IV  Last administered on 4/6/20at 22:24;  Start 4/6/20 

at 22:00;  Stop 4/7/20 at 21:59;  Status DC


Propofol 20 ml @ As Directed STK-MED ONCE IV ;  Start 4/6/20 at 11:07;  Stop 

4/6/20 at 11:07;  Status DC


Cellulose (Surgicel Hemostat 4x8) 1 each STK-MED ONCE .ROUTE  Last administered 

on 4/6/20at 11:44;  Start 4/6/20 at 11:55;  Stop 4/6/20 at 11:56;  Status DC


Sevoflurane (Ultane) 60 ml STK-MED ONCE IH ;  Start 4/6/20 at 12:46;  Stop 

4/6/20 at 12:46;  Status DC


Sodium Chloride 1,000 ml @  1,000 mls/hr Q1H PRN IV hypotension;  Start 4/6/20 

at 13:51;  Stop 4/6/20 at 19:50;  Status DC


Albumin Human 200 ml @  200 mls/hr 1X PRN  PRN IV Hypotension Last administered 

on 4/6/20at 14:51;  Start 4/6/20 at 14:00;  Stop 4/6/20 at 19:59;  Status DC


Diphenhydramine HCl (Benadryl) 25 mg 1X PRN  PRN IV ITCHING;  Start 4/6/20 at 

14:00;  Stop 4/7/20 at 13:59;  Status DC


Diphenhydramine HCl (Benadryl) 25 mg 1X PRN  PRN IV ITCHING;  Start 4/6/20 at 

14:00;  Stop 4/7/20 at 13:59;  Status DC


Sodium Chloride 1,000 ml @  400 mls/hr Q2H30M PRN IV PATENCY;  Start 4/6/20 at 

13:51;  Stop 4/7/20 at 01:50;  Status DC


Info (PHARMACY MONITORING -- do not chart) 1 each PRN DAILY  PRN MC SEE 

COMMENTS;  Start 4/6/20 at 14:00;  Stop 4/9/20 at 08:16;  Status DC


Heparin Sodium (Porcine) (Hep Lock Adult) 500 unit STK-MED ONCE IVP ;  Start 

4/7/20 at 09:29;  Stop 4/7/20 at 09:30;  Status DC


Sodium Chloride 1,000 ml @  1,000 mls/hr Q1H PRN IV hypotension;  Start 4/7/20 

at 10:43;  Stop 4/7/20 at 16:42;  Status DC


Sodium Chloride 1,000 ml @  400 mls/hr Q2H30M PRN IV PATENCY;  Start 4/7/20 at 

10:43;  Stop 4/7/20 at 22:42;  Status DC


Info (PHARMACY MONITORING -- do not chart) 1 each PRN DAILY  PRN MC SEE 

COMMENTS;  Start 4/7/20 at 10:45;  Status UNV


Info (PHARMACY MONITORING -- do not chart) 1 each PRN DAILY  PRN MC SEE 

COMMENTS;  Start 4/7/20 at 10:45;  Status UNV


Sodium Chloride 90 meq/Potassium Chloride 15 meq/ Magnesium Sulfate 12 

meq/Calcium Gluconate 15 meq/ Multivitamins 10 ml/Chromium/ Copper/Manganese/ 

Seleni/Zn 0.5 ml/ Insulin Human Regular 25 unit/ Total Parenteral 

Nutrition/Amino Acids/Dextrose/ Fat Emulsion Intravenous 1,400 ml @  58.333 mls/

hr TPN  CONT IV  Last administered on 4/7/20at 22:13;  Start 4/7/20 at 22:00;  

Stop 4/8/20 at 21:59;  Status DC


Sodium Chloride 1,000 ml @  1,000 mls/hr Q1H PRN IV hypotension;  Start 4/8/20 

at 07:50;  Stop 4/8/20 at 13:49;  Status DC


Albumin Human 200 ml @  200 mls/hr 1X  ONCE IV ;  Start 4/8/20 at 08:00;  Stop 

4/8/20 at 08:53;  Status DC


Diphenhydramine HCl (Benadryl) 25 mg 1X PRN  PRN IV ITCHING;  Start 4/8/20 at 

08:00;  Stop 4/9/20 at 07:59;  Status DC


Diphenhydramine HCl (Benadryl) 25 mg 1X PRN  PRN IV ITCHING;  Start 4/8/20 at 

08:00;  Stop 4/9/20 at 07:59;  Status DC


Info (PHARMACY MONITORING -- do not chart) 1 each PRN DAILY  PRN MC SEE 

COMMENTS;  Start 4/8/20 at 08:00;  Stop 4/9/20 at 08:16;  Status DC


Albumin Human 50 ml @ 50 mls/hr 1X  ONCE IV ;  Start 4/8/20 at 08:53;  Stop 

4/8/20 at 08:56;  Status DC


Albumin Human 200 ml @  50 mls/hr PRN 1X  PRN IV HYPOTENSION Last administered 

on 4/14/20at 11:54;  Start 4/8/20 at 09:00


Meropenem 500 mg/ Sodium Chloride 50 ml @  100 mls/hr Q12H IV  Last administered

on 4/27/20at 09:24;  Start 4/8/20 at 10:00


Sodium Chloride 90 meq/Magnesium Sulfate 12 meq/ Calcium Gluconate 15 meq/ 

Multivitamins 10 ml/Chromium/ Copper/Manganese/ Seleni/Zn 0.5 ml/ Insulin Human 

Regular 25 unit/ Total Parenteral Nutrition/Amino Acids/Dextrose/ Fat Emulsion 

Intravenous 1,400 ml @  58.333 mls/ hr TPN  CONT IV  Last administered on 

4/8/20at 21:41;  Start 4/8/20 at 22:00;  Stop 4/9/20 at 21:59;  Status DC


Sodium Chloride 1,000 ml @  1,000 mls/hr Q1H PRN IV hypotension;  Start 4/9/20 

at 07:58;  Stop 4/9/20 at 13:57;  Status DC


Albumin Human 200 ml @  200 mls/hr 1X PRN  PRN IV Hypotension Last administered 

on 4/9/20at 09:30;  Start 4/9/20 at 08:00;  Stop 4/9/20 at 13:59;  Status DC


Sodium Chloride 1,000 ml @  400 mls/hr Q2H30M PRN IV PATENCY;  Start 4/9/20 at 

07:58;  Stop 4/9/20 at 19:57;  Status DC


Info (PHARMACY MONITORING -- do not chart) 1 each PRN DAILY  PRN MC SEE 

COMMENTS;  Start 4/9/20 at 08:00;  Status Cancel


Info (PHARMACY MONITORING -- do not chart) 1 each PRN DAILY  PRN MC SEE 

COMMENTS;  Start 4/9/20 at 08:15;  Status UNV


Sodium Chloride 90 meq/Potassium Phosphate 5 mmol/ Magnesium Sulfate 12 

meq/Calcium Gluconate 15 meq/ Multivitamins 10 ml/Chromium/ Copper/Manganese/ 

Seleni/Zn 0.5 ml/ Insulin Human Regular 30 unit/ Total Parenteral 

Nutrition/Amino Acids/Dextrose/ Fat Emulsion Intravenous 1,400 ml @  58.333 mls/

hr TPN  CONT IV  Last administered on 4/9/20at 22:08;  Start 4/9/20 at 22:00;  

Stop 4/10/20 at 21:59;  Status DC


Linezolid/Dextrose 300 ml @  300 mls/hr Q12HR IV  Last administered on 4/20/20at

20:40;  Start 4/10/20 at 11:00;  Stop 4/21/20 at 08:10;  Status DC


Sodium Chloride 90 meq/Potassium Phosphate 15 mmol/ Magnesium Sulfate 12 

meq/Calcium Gluconate 15 meq/ Multivitamins 10 ml/Chromium/ Copper/Manganese/ 

Seleni/Zn 0.5 ml/ Insulin Human Regular 30 unit/ Total Parenteral 

Nutrition/Amino Acids/Dextrose/ Fat Emulsion Intravenous 1,400 ml @  58.333 mls/

hr TPN  CONT IV  Last administered on 4/10/20at 21:49;  Start 4/10/20 at 22:00; 

Stop 4/11/20 at 21:59;  Status DC


Sodium Chloride 90 meq/Potassium Phosphate 15 mmol/ Magnesium Sulfate 12 

meq/Calcium Gluconate 15 meq/ Multivitamins 10 ml/Chromium/ Copper/Manganese/ 

Seleni/Zn 0.5 ml/ Insulin Human Regular 40 unit/ Total Parenteral 

Nutrition/Amino Acids/Dextrose/ Fat Emulsion Intravenous 1,400 ml @  58.333 mls/

hr TPN  CONT IV  Last administered on 4/11/20at 21:21;  Start 4/11/20 at 22:00; 

Stop 4/12/20 at 21:59;  Status DC


Sodium Chloride 1,000 ml @  1,000 mls/hr Q1H PRN IV hypotension;  Start 4/11/20 

at 13:26;  Stop 4/11/20 at 19:25;  Status DC


Albumin Human 200 ml @  200 mls/hr 1X PRN  PRN IV Hypotension Last administered 

on 4/11/20at 15:00;  Start 4/11/20 at 13:30;  Stop 4/11/20 at 19:29;  Status DC


Sodium Chloride (Normal Saline Flush) 10 ml 1X PRN  PRN IV AP catheter pack;  

Start 4/11/20 at 13:30;  Stop 4/12/20 at 13:29;  Status DC


Sodium Chloride (Normal Saline Flush) 10 ml 1X PRN  PRN IV  catheter pack;  

Start 4/11/20 at 13:30;  Stop 4/12/20 at 13:29;  Status DC


Sodium Chloride 1,000 ml @  400 mls/hr Q2H30M PRN IV PATENCY;  Start 4/11/20 at 

13:26;  Stop 4/12/20 at 01:25;  Status DC


Info (PHARMACY MONITORING -- do not chart) 1 each PRN DAILY  PRN MC SEE 

COMMENTS;  Start 4/11/20 at 13:30;  Stop 4/11/20 at 13:33;  Status DC


Info (PHARMACY MONITORING -- do not chart) 1 each PRN DAILY  PRN MC SEE 

COMMENTS;  Start 4/11/20 at 13:30;  Stop 4/11/20 at 13:34;  Status DC


Sodium Chloride 90 meq/Potassium Phosphate 19 mmol/ Magnesium Sulfate 12 

meq/Calcium Gluconate 15 meq/ Multivitamins 10 ml/Chromium/ Copper/Manganese/ 

Seleni/Zn 0.5 ml/ Insulin Human Regular 40 unit/ Total Parenteral 

Nutrition/Amino Acids/Dextrose/ Fat Emulsion Intravenous 1,400 ml @  58.333 mls/

hr TPN  CONT IV  Last administered on 4/12/20at 21:54;  Start 4/12/20 at 22:00; 

Stop 4/13/20 at 21:59;  Status DC


Sodium Chloride 1,000 ml @  1,000 mls/hr Q1H PRN IV hypotension;  Start 4/13/20 

at 09:35;  Stop 4/13/20 at 15:34;  Status DC


Albumin Human 200 ml @  200 mls/hr 1X PRN  PRN IV Hypotension;  Start 4/13/20 at

09:45;  Stop 4/13/20 at 15:44;  Status DC


Diphenhydramine HCl (Benadryl) 25 mg 1X PRN  PRN IV ITCHING;  Start 4/13/20 at 

09:45;  Stop 4/14/20 at 09:44;  Status DC


Diphenhydramine HCl (Benadryl) 25 mg 1X PRN  PRN IV ITCHING;  Start 4/13/20 at 

09:45;  Stop 4/14/20 at 09:44;  Status DC


Sodium Chloride 1,000 ml @  400 mls/hr Q2H30M PRN IV PATENCY;  Start 4/13/20 at 

09:35;  Stop 4/13/20 at 21:34;  Status DC


Info (PHARMACY MONITORING -- do not chart) 1 each PRN DAILY  PRN MC SEE 

COMMENTS;  Start 4/13/20 at 09:45;  Status Cancel


Sodium Chloride 100 meq/Potassium Phosphate 19 mmol/ Magnesium Sulfate 12 

meq/Calcium Gluconate 15 meq/ Multivitamins 10 ml/Chromium/ Copper/Manganese/ 

Seleni/Zn 0.5 ml/ Insulin Human Regular 40 unit/ Potassium Chloride 20 meq/ 

Total Parenteral Nutrition/Amino Acids/Dextrose/ Fat Emulsion Intravenous 1,400 

ml @  58.333 mls/ hr TPN  CONT IV  Last administered on 4/13/20at 22:02;  Start 

4/13/20 at 22:00;  Stop 4/14/20 at 21:59;  Status DC


Furosemide (Lasix) 40 mg 1X  ONCE IVP  Last administered on 4/13/20at 14:39;  

Start 4/13/20 at 14:30;  Stop 4/13/20 at 14:31;  Status DC


Metronidazole 100 ml @  100 mls/hr Q8HRS IV  Last administered on 4/21/20at 

06:04;  Start 4/14/20 at 10:00;  Stop 4/21/20 at 08:10;  Status DC


Sodium Chloride 1,000 ml @  1,000 mls/hr Q1H PRN IV hypotension;  Start 4/14/20 

at 08:00;  Stop 4/14/20 at 13:59;  Status DC


Albumin Human 200 ml @  200 mls/hr 1X PRN  PRN IV Hypotension;  Start 4/14/20 at

08:00;  Stop 4/14/20 at 13:59;  Status DC


Sodium Chloride 1,000 ml @  400 mls/hr Q2H30M PRN IV PATENCY;  Start 4/14/20 at 

08:00;  Stop 4/14/20 at 19:59;  Status DC


Info (PHARMACY MONITORING -- do not chart) 1 each PRN DAILY  PRN MC SEE 

COMMENTS;  Start 4/14/20 at 11:30;  Status UNV


Info (PHARMACY MONITORING -- do not chart) 1 each PRN DAILY  PRN MC SEE 

COMMENTS;  Start 4/14/20 at 11:30;  Stop 4/16/20 at 12:13;  Status DC


Sodium Chloride 100 meq/Potassium Phosphate 19 mmol/ Magnesium Sulfate 12 

meq/Calcium Gluconate 15 meq/ Multivitamins 10 ml/Chromium/ Copper/Manganese/ 

Seleni/Zn 0.5 ml/ Insulin Human Regular 40 unit/ Potassium Chloride 20 meq/ 

Total Parenteral Nutrition/Amino Acids/Dextrose/ Fat Emulsion Intravenous 1,400 

ml @  58.333 mls/ hr TPN  CONT IV  Last administered on 4/14/20at 21:52;  Start 

4/14/20 at 22:00;  Stop 4/15/20 at 21:59;  Status DC


Sodium Chloride (Normal Saline Flush) 10 ml QSHIFT  PRN IV AFTER MEDS AND BLOOD 

DRAWS;  Start 4/14/20 at 15:00


Sodium Chloride (Normal Saline Flush) 10 ml PRN Q5MIN  PRN IV AFTER MEDS AND 

BLOOD DRAWS;  Start 4/14/20 at 15:00


Sodium Chloride (Normal Saline Flush) 20 ml PRN Q5MIN  PRN IV AFTER MEDS AND 

BLOOD DRAWS;  Start 4/14/20 at 15:00


Sodium Chloride 100 meq/Potassium Phosphate 19 mmol/ Magnesium Sulfate 12 

meq/Calcium Gluconate 15 meq/ Multivitamins 10 ml/Chromium/ Copper/Manganese/ 

Seleni/Zn 0.5 ml/ Insulin Human Regular 40 unit/ Potassium Chloride 20 meq/ 

Total Parenteral Nutrition/Amino Acids/Dextrose/ Fat Emulsion Intravenous 1,400 

ml @  58.333 mls/ hr TPN  CONT IV  Last administered on 4/15/20at 21:20;  Start 

4/15/20 at 22:00;  Stop 4/16/20 at 21:59;  Status DC


Lidocaine HCl (Buffered Lidocaine 1%) 3 ml STK-MED ONCE .ROUTE ;  Start 4/15/20 

at 13:16;  Stop 4/15/20 at 13:16;  Status DC


Lidocaine HCl (Buffered Lidocaine 1%) 6 ml 1X  ONCE INJ  Last administered on 

4/15/20at 13:45;  Start 4/15/20 at 13:30;  Stop 4/15/20 at 13:31;  Status DC


Albumin Human 100 ml @  100 mls/hr 1X  ONCE IV  Last administered on 4/15/20at 

15:41;  Start 4/15/20 at 15:00;  Stop 4/15/20 at 15:59;  Status DC


Albumin Human 50 ml @ 50 mls/hr 1X  ONCE IV  Last administered on 4/15/20at 

15:00;  Start 4/15/20 at 15:00;  Stop 4/15/20 at 15:59;  Status DC


Info (PHARMACY MONITORING -- do not chart) 1 each PRN DAILY  PRN MC SEE 

COMMENTS;  Start 4/16/20 at 11:30;  Status Cancel


Info (PHARMACY MONITORING -- do not chart) 1 each PRN DAILY  PRN MC SEE 

COMMENTS;  Start 4/16/20 at 11:30;  Status UNV


Sodium Chloride 100 meq/Potassium Phosphate 10 mmol/ Magnesium Sulfate 12 

meq/Calcium Gluconate 15 meq/ Multivitamins 10 ml/Chromium/ Copper/Manganese/ 

Seleni/Zn 0.5 ml/ Insulin Human Regular 35 unit/ Potassium Chloride 20 meq/ 

Total Parenteral Nutrition/Amino Acids/Dextrose/ Fat Emulsion Intravenous 1,400 

ml @  58.333 mls/ hr TPN  CONT IV  Last administered on 4/16/20at 22:10;  Start 

4/16/20 at 22:00;  Stop 4/17/20 at 21:59;  Status DC


Sodium Chloride 100 meq/Potassium Phosphate 5 mmol/ Magnesium Sulfate 12 

meq/Calcium Gluconate 15 meq/ Multivitamins 10 ml/Chromium/ Copper/Manganese/ 

Seleni/Zn 0.5 ml/ Insulin Human Regular 35 unit/ Potassium Chloride 20 meq/ 

Total Parenteral Nutrition/Amino Acids/Dextrose/ Fat Emulsion Intravenous 1,400 

ml @  58.333 mls/ hr TPN  CONT IV  Last administered on 4/17/20at 22:59;  Start 

4/17/20 at 22:00;  Stop 4/18/20 at 21:59;  Status DC


Sodium Chloride 1,000 ml @  1,000 mls/hr Q1H PRN IV hypotension;  Start 4/18/20 

at 08:27;  Stop 4/18/20 at 14:26;  Status DC


Albumin Human 200 ml @  200 mls/hr 1X PRN  PRN IV Hypotension Last administered 

on 4/18/20at 09:18;  Start 4/18/20 at 08:30;  Stop 4/18/20 at 14:29;  Status DC


Sodium Chloride 1,000 ml @  400 mls/hr Q2H30M PRN IV PATENCY;  Start 4/18/20 at 

08:27;  Stop 4/18/20 at 20:26;  Status DC


Info (PHARMACY MONITORING -- do not chart) 1 each PRN DAILY  PRN MC SEE 

COMMENTS;  Start 4/18/20 at 08:30;  Status Cancel


Info (PHARMACY MONITORING -- do not chart) 1 each PRN DAILY  PRN MC SEE 

COMMENTS;  Start 4/18/20 at 08:30;  Stop 4/26/20 at 13:10;  Status DC


Sodium Chloride 100 meq/Potassium Chloride 40 meq/ Magnesium Sulfate 15 

meq/Calcium Gluconate 15 meq/ Multivitamins 10 ml/Chromium/ Copper/Manganese/ 

Seleni/Zn 0.5 ml/ Insulin Human Regular 35 unit/ Total Parenteral 

Nutrition/Amino Acids/Dextrose/ Fat Emulsion Intravenous 1,400 ml @  58.333 mls/

hr TPN  CONT IV  Last administered on 4/18/20at 22:00;  Start 4/18/20 at 22:00; 

Stop 4/19/20 at 21:59;  Status DC


Potassium Chloride/Water 100 ml @  100 mls/hr 1X  ONCE IV  Last administered on 

4/18/20at 17:28;  Start 4/18/20 at 14:45;  Stop 4/18/20 at 15:44;  Status DC


Sodium Chloride 100 meq/Potassium Chloride 40 meq/ Magnesium Sulfate 15 

meq/Calcium Gluconate 15 meq/ Multivitamins 10 ml/Chromium/ Copper/Manganese/ 

Seleni/Zn 0.5 ml/ Insulin Human Regular 35 unit/ Total Parenteral 

Nutrition/Amino Acids/Dextrose/ Fat Emulsion Intravenous 1,400 ml @  58.333 mls/

hr TPN  CONT IV  Last administered on 4/19/20at 22:46;  Start 4/19/20 at 22:00; 

Stop 4/20/20 at 21:59;  Status DC


Sodium Chloride 100 meq/Potassium Chloride 40 meq/ Magnesium Sulfate 20 

meq/Calcium Gluconate 15 meq/ Multivitamins 10 ml/Chromium/ Copper/Manganese/ 

Seleni/Zn 0.5 ml/ Insulin Human Regular 35 unit/ Total Parenteral Nutri

tion/Amino Acids/Dextrose/ Fat Emulsion Intravenous 1,400 ml @  58.333 mls/ hr 

TPN  CONT IV  Last administered on 4/20/20at 22:31;  Start 4/20/20 at 22:00;  

Stop 4/21/20 at 21:59;  Status DC


Fentanyl Citrate (Fentanyl 2ml Vial) 50 mcg PRN Q2HR  PRN IVP PAIN Last 

administered on 4/27/20at 01:56;  Start 4/20/20 at 21:00


Fentanyl Citrate (Fentanyl 2ml Vial) 25 mcg PRN Q2HR  PRN IVP PAIN;  Start 

4/20/20 at 21:00


Enoxaparin Sodium (Lovenox 100mg Syringe) 100 mg Q12HR SQ ;  Start 4/21/20 at 

21:00;  Status UNV


Amino Acids/ Glycerin/ Electrolytes 1,000 ml @  75 mls/hr [id number] IV ;  Start 

4/20/20 at 21:15;  Status UNV


Sodium Chloride 1,000 ml @  1,000 mls/hr Q1H PRN IV hypotension;  Start 4/21/20 

at 07:56;  Stop 4/21/20 at 13:55;  Status DC


Albumin Human 200 ml @  200 mls/hr 1X PRN  PRN IV Hypotension Last administered 

on 4/21/20at 08:40;  Start 4/21/20 at 08:00;  Stop 4/21/20 at 13:59;  Status DC


Sodium Chloride 1,000 ml @  400 mls/hr Q2H30M PRN IV PATENCY;  Start 4/21/20 at 

07:56;  Stop 4/21/20 at 19:55;  Status DC


Info (PHARMACY MONITORING -- do not chart) 1 each PRN DAILY  PRN MC SEE 

COMMENTS;  Start 4/21/20 at 08:00;  Status UNV


Info (PHARMACY MONITORING -- do not chart) 1 each PRN DAILY  PRN MC SEE 

COMMENTS;  Start 4/21/20 at 08:00;  Status UNV


Daptomycin 430 mg/ Sodium Chloride 50 ml @  100 mls/hr Q24H IV  Last 

administered on 4/21/20at 12:35;  Start 4/21/20 at 09:00;  Stop 4/21/20 at 12

:49;  Status DC


Sodium Chloride 100 meq/Potassium Chloride 40 meq/ Magnesium Sulfate 20 

meq/Calcium Gluconate 15 meq/ Multivitamins 10 ml/Chromium/ Copper/Manganese/ 

Seleni/Zn 0.5 ml/ Insulin Human Regular 35 unit/ Total Parenteral 

Nutrition/Amino Acids/Dextrose/ Fat Emulsion Intravenous 1,400 ml @  58.333 mls/

hr TPN  CONT IV  Last administered on 4/21/20at 21:26;  Start 4/21/20 at 22:00; 

Stop 4/22/20 at 21:59;  Status DC


Daptomycin 430 mg/ Sodium Chloride 50 ml @  100 mls/hr Q48H IV ;  Start 4/23/20 

at 09:00;  Stop 4/22/20 at 11:55;  Status DC


Sodium Chloride 100 meq/Potassium Chloride 40 meq/ Magnesium Sulfate 20 

meq/Calcium Gluconate 15 meq/ Multivitamins 10 ml/Chromium/ Copper/Manganese/ 

Seleni/Zn 0.5 ml/ Insulin Human Regular 35 unit/ Total Parenteral 

Nutrition/Amino Acids/Dextrose/ Fat Emulsion Intravenous 1,400 ml @  58.333 mls/

hr TPN  CONT IV  Last administered on 4/22/20at 22:27;  Start 4/22/20 at 22:00; 

Stop 4/23/20 at 21:59;  Status DC


Daptomycin 430 mg/ Sodium Chloride 50 ml @  100 mls/hr Q24H IV  Last 

administered on 4/24/20at 15:07;  Start 4/22/20 at 13:00;  Stop 4/25/20 at 

13:15;  Status DC


Sodium Chloride 100 meq/Potassium Chloride 40 meq/ Magnesium Sulfate 20 

meq/Calcium Gluconate 10 meq/ Multivitamins 10 ml/Chromium/ Copper/Manganese/ 

Seleni/Zn 0.5 ml/ Insulin Human Regular 35 unit/ Total Parenteral 

Nutrition/Amino Acids/Dextrose/ Fat Emulsion Intravenous 1,400 ml @  58.333 mls/

hr TPN  CONT IV  Last administered on 4/24/20at 00:06;  Start 4/23/20 at 22:00; 

Stop 4/24/20 at 21:59;  Status DC


Alteplase, Recombinant (Cathflo For Central Catheter Clearance) 1 mg 1X  ONCE 

INT CAT  Last administered on 4/24/20at 11:44;  Start 4/24/20 at 10:45;  Stop 

4/24/20 at 10:46;  Status DC


Ondansetron HCl (Zofran) 4 mg PRN Q6HRS  PRN IV NAUSEA/VOMITING;  Start 4/27/20 

at 07:00;  Stop 4/28/20 at 06:59


Fentanyl Citrate (Fentanyl 2ml Vial) 25 mcg PRN Q5MIN  PRN IV MILD PAIN 1-3;  

Start 4/27/20 at 07:00;  Stop 4/28/20 at 06:59


Fentanyl Citrate (Fentanyl 2ml Vial) 50 mcg PRN Q5MIN  PRN IV MODERATE TO SEVERE

PAIN Last administered on 4/27/20at 10:17;  Start 4/27/20 at 07:00;  Stop 

4/28/20 at 06:59


Ringer's Solution 1,000 ml @  30 mls/hr Q24H IV ;  Start 4/27/20 at 07:00;  Stop

4/27/20 at 18:59


Lidocaine HCl (Xylocaine-Mpf 1% 2ml Vial) 2 ml PRN 1X  PRN ID PRIOR TO IV START;

 Start 4/27/20 at 07:00;  Stop 4/28/20 at 06:59


Prochlorperazine Edisylate (Compazine) 5 mg PACU PRN  PRN IV NAUSEA, MRX1;  

Start 4/27/20 at 07:00;  Stop 4/28/20 at 06:59


Sodium Acetate 50 meq/Potassium Acetate 55 meq/ Magnesium Sulfate 20 meq/Calcium

Gluconate 10 meq/ Multivitamins 10 ml/Chromium/ Copper/Manganese/ Seleni/Zn 0.5 

ml/ Insulin Human Regular 35 unit/ Total Parenteral Nutrition/Amino 

Acids/Dextrose/ Fat Emulsion Intravenous 1,400 ml @  58.333 mls/ hr TPN  CONT IV

;  Start 4/24/20 at 22:00;  Stop 4/24/20 at 14:15;  Status DC


Sodium Acetate 50 meq/Potassium Acetate 55 meq/ Magnesium Sulfate 20 meq/Calcium

Gluconate 10 meq/ Multivitamins 10 ml/Chromium/ Copper/Manganese/ Seleni/Zn 0.5 

ml/ Insulin Human Regular 35 unit/ Total Parenteral Nutrition/Amino 

Acids/Dextrose/ Fat Emulsion Intravenous 1,800 ml @  75 mls/hr TPN  CONT IV  

Last administered on 4/24/20at 22:38;  Start 4/24/20 at 22:00;  Stop 4/25/20 at 

21:59;  Status DC


Sodium Chloride 1,000 ml @  1,000 mls/hr Q1H PRN IV hypotension;  Start 4/24/20 

at 15:31;  Stop 4/24/20 at 21:30;  Status DC


Diphenhydramine HCl (Benadryl) 25 mg 1X PRN  PRN IV ITCHING;  Start 4/24/20 at 

15:45;  Stop 4/25/20 at 15:44;  Status DC


Diphenhydramine HCl (Benadryl) 25 mg 1X PRN  PRN IV ITCHING;  Start 4/24/20 at 

15:45;  Stop 4/25/20 at 15:44;  Status DC


Sodium Chloride 1,000 ml @  400 mls/hr Q2H30M PRN IV PATENCY;  Start 4/24/20 at 

15:31;  Stop 4/25/20 at 03:30;  Status DC


Info (PHARMACY MONITORING -- do not chart) 1 each PRN DAILY  PRN MC SEE 

COMMENTS;  Start 4/24/20 at 15:45


Sodium Acetate 50 meq/Potassium Acetate 55 meq/ Magnesium Sulfate 20 meq/Calcium

Gluconate 10 meq/ Multivitamins 10 ml/Chromium/ Copper/Manganese/ Seleni/Zn 0.5 

ml/ Insulin Human Regular 35 unit/ Total Parenteral Nutrition/Amino Acids/Dextro

se/ Fat Emulsion Intravenous 1,800 ml @  75 mls/hr TPN  CONT IV  Last 

administered on 4/25/20at 22:03;  Start 4/25/20 at 22:00;  Stop 4/26/20 at 

21:59;  Status DC


Daptomycin 430 mg/ Sodium Chloride 50 ml @  100 mls/hr Q24H IV  Last 

administered on 4/26/20at 14:13;  Start 4/25/20 at 13:00


Heparin Sodium (Porcine) 1000 unit/Sodium Chloride 1,001 ml @  1,001 mls/hr 1X  

ONCE IRR ;  Start 4/27/20 at 06:00;  Stop 4/27/20 at 06:59;  Status DC


Potassium Acetate 55 meq/Magnesium Sulfate 20 meq/ Calcium Gluconate 10 meq/ 

Multivitamins 10 ml/Chromium/ Copper/Manganese/ Seleni/Zn 0.5 ml/ Insulin Human 

Regular 35 unit/ Total Parenteral Nutrition/Amino Acids/Dextrose/ Fat Emulsion 

Intravenous 1,920 ml @  80 mls/hr TPN  CONT IV  Last administered on 4/26/20at 

22:10;  Start 4/26/20 at 22:00;  Stop 4/27/20 at 21:59


Dexamethasone Sodium Phosphate (Decadron) 4 mg STK-MED ONCE .ROUTE ;  Start 

4/27/20 at 10:56;  Stop 4/27/20 at 10:57;  Status DC


Ondansetron HCl (Zofran) 4 mg STK-MED ONCE .ROUTE ;  Start 4/27/20 at 10:56;  

Stop 4/27/20 at 10:57;  Status DC


Rocuronium Bromide (Zemuron) 50 mg STK-MED ONCE .ROUTE ;  Start 4/27/20 at 

10:56;  Stop 4/27/20 at 10:57;  Status DC


Fentanyl Citrate (Fentanyl 2ml Vial) 100 mcg STK-MED ONCE .ROUTE ;  Start 

4/27/20 at 10:56;  Stop 4/27/20 at 10:57;  Status DC


Bupivacaine HCl/ Epinephrine Bitart (Sensorcain-Epi 0.5%-1:583404 Mpf) 30 ml 

STK-MED ONCE .ROUTE ;  Start 4/27/20 at 10:58;  Stop 4/27/20 at 10:58;  Status 

DC


Cellulose (Surgicel Hemostat 2x14) 1 each STK-MED ONCE .ROUTE ;  Start 4/27/20 

at 10:58;  Stop 4/27/20 at 10:59;  Status DC


Iohexol (Omnipaque 300 Mg/ml) 50 ml STK-MED ONCE .ROUTE ;  Start 4/27/20 at 

10:58;  Stop 4/27/20 at 10:59;  Status DC


Cellulose (Surgicel Hemostat 4x8) 1 each STK-MED ONCE .ROUTE ;  Start 4/27/20 at

10:58;  Stop 4/27/20 at 10:59;  Status DC


Bisacodyl (Dulcolax Supp) 10 mg STK-MED ONCE .ROUTE ;  Start 4/27/20 at 10:59;  

Stop 4/27/20 at 10:59;  Status DC


Heparin Sodium (Porcine) 1000 unit/Sodium Chloride 1,001 ml @  1,001 mls/hr 1X  

ONCE IRR ;  Start 4/27/20 at 12:00;  Stop 4/27/20 at 12:59


Propofol 20 ml @ As Directed STK-MED ONCE IV ;  Start 4/27/20 at 11:05;  Stop 

4/27/20 at 11:05;  Status DC


Sevoflurane (Ultane) 90 ml STK-MED ONCE IH ;  Start 4/27/20 at 11:05;  Stop 

4/27/20 at 11:05;  Status DC





Active Scripts


Active


Reported


Bisoprolol Fumarate 5 Mg Tablet 10 Mg PO DAILY


Vitals/I & O





Vital Sign - Last 24 Hours








 4/26/20 4/26/20 4/26/20 4/26/20





 11:41 12:00 12:00 13:00


 


Temp  100.3  





  100.3  


 


Pulse  90  80


 


Resp  36  25


 


B/P (MAP)  128/68 (88)  117/69 (85)


 


Pulse Ox 98 99  99


 


O2 Delivery Ventilator Ventilator Mechanical Ventilator Ventilator


 


    





    





 4/26/20 4/26/20 4/26/20 4/26/20





 13:36 13:56 14:00 14:26


 


Pulse   99 


 


Resp  27 37 29


 


B/P (MAP)   140/83 (102) 


 


Pulse Ox 99 100 100 99


 


O2 Delivery Ventilator Ventilator Ventilator Ventilator





 4/26/20 4/26/20 4/26/20 4/26/20





 15:00 15:40 16:00 16:00


 


Temp    101.1





    101.1


 


Pulse 93   92


 


Resp 32   


 


B/P (MAP) 162/93 (116)   148/84 (105)


 


Pulse Ox 99 99  100


 


O2 Delivery Ventilator Ventilator Mechanical Ventilator Ventilator


 


    





    





 4/26/20 4/26/20 4/26/20 4/26/20





 16:29 16:59 17:00 17:29


 


Pulse   113 


 


Resp 33 31 36 


 


B/P (MAP)   168/91 (116) 


 


Pulse Ox 100 99 98 98


 


O2 Delivery Ventilator Ventilator Ventilator Ventilator





 4/26/20 4/26/20 4/26/20 4/26/20





 18:00 19:00 20:00 20:00


 


Temp   100.1 





   100.1 


 


Pulse 96 92 86 


 


Resp 29 29 34 


 


B/P (MAP) 168/92 (117) 140/74 (96) 120/69 (86) 


 


Pulse Ox 96 98 97 


 


O2 Delivery Ventilator Ventilator Ventilator Mechanical Ventilator


 


    





    





 4/26/20 4/26/20 4/26/20 4/26/20





 20:23 21:00 22:00 22:49


 


Pulse  80 86 


 


Resp  23 31 26


 


B/P (MAP)  111/57 (75) 119/79 (92) 


 


Pulse Ox 97 97 99 99


 


O2 Delivery Ventilator Ventilator Ventilator Ventilator





 4/26/20 4/26/20 4/27/20 4/27/20





 23:00 23:41 00:00 00:08


 


Temp   101.1 





   101.1 


 


Pulse 108  108 


 


Resp 29  40 


 


B/P (MAP) 131/63 (85)  133/83 (100) 


 


Pulse Ox 97 97 97 


 


O2 Delivery Ventilator Ventilator Ventilator Mechanical Ventilator


 


    





    





 4/27/20 4/27/20 4/27/20 4/27/20





 01:00 01:56 02:00 02:30


 


Temp   100.5 





   100.5 


 


Pulse 84  80 


 


Resp 22 18 18 


 


B/P (MAP) 117/64 (81)  121/73 (89) 


 


Pulse Ox 97  96 97


 


O2 Delivery Ventilator Ventilator Ventilator Ventilator


 


    





    





 4/27/20 4/27/20 4/27/20 4/27/20





 03:00 04:00 04:00 04:54


 


Temp  100.2  





  100.2  


 


Pulse 78 78  


 


Resp 22 28  


 


B/P (MAP) 147/75 (99) 130/80 (97)  


 


Pulse Ox 98 97  97


 


O2 Delivery Ventilator Ventilator Mechanical Ventilator Ventilator


 


    





    





 4/27/20 4/27/20 4/27/20 4/27/20





 05:00 06:00 07:00 07:48


 


Pulse 80 74 90 


 


Resp 18 18 28 


 


B/P (MAP) 124/72 (89) 130/68 (88) 132/83 (99) 


 


Pulse Ox 98 97 100 100


 


O2 Delivery Ventilator Ventilator Ventilator Ventilator





 4/27/20 4/27/20 4/27/20 4/27/20





 07:55 08:00 08:00 09:00


 


Temp  100.2  





  100.2  


 


Pulse  88  88


 


Resp  35  23


 


B/P (MAP)  104/81 (89)  120/73 (89)


 


Pulse Ox  99  98


 


O2 Delivery Ventilator Ventilator Mechanical Ventilator Ventilator


 


    





    





 4/27/20 4/27/20 4/27/20 4/27/20





 09:40 10:00 10:17 10:47


 


Pulse  94  


 


Resp  27  


 


B/P (MAP)  151/82 (105)  


 


Pulse Ox 100 100 99 99


 


O2 Delivery Ventilator Ventilator Ventilator Ventilator





 4/27/20   





 11:00   


 


Pulse 76   


 


Resp 24   


 


B/P (MAP) 155/76 (102)   


 


Pulse Ox 99   


 


O2 Delivery Ventilator   














Intake and Output   


 


 4/26/20 4/26/20 4/27/20





 15:00 23:00 07:00


 


Intake Total 150 ml 1446 ml 1154 ml


 


Output Total 975 ml 1185 ml 915 ml


 


Balance -825 ml 261 ml 239 ml

















CLEMENTINA PANTOJA MD           Apr 27, 2020 11:35

## 2020-04-27 NOTE — PDOC4
OPERATIVE NOTE


Date:


Date:  Apr 27, 2020





Pre-Op Diagnosis:


Necrotizing pancreatitis





Post-Op Diagnosis:


same





Procedure Performed:


laparoscopic exploration





Surgeon:


Frandy Zhou


Asst:  Dr. Luis Santos





Anesthesia Type:


GETA plus local





Blood Loss:


50





Specimans Obtained:


cultures, debris





Findings:


1000 cc ascites suctioned off, cultures sent, diffuse debris, obliteration of 

surgical planes preventing any meaningful exploration





Complications:


none





Operative Note:


After obtaining informed consent, patient was taken to OR, induced under GETA 

and prepped in the usual fashion.  5 mm port placed umbilical and right mid 

abdomen, all under laparoscopic guidance.  Large amount of ascites encountered 

and aspirated off.  Fluid was clear with some whitish debris.  Viscera was 

completely locked in with obliteration of all planes, preventing any significant

exploration.  Copious irrigation.





Given patient's overall clinical improvement, favor against open procedure and 

attempt at cholecystectomy and/or necrosectomy, given high risk of 

complications.  Cholecystectomy will need to be performed, but favor waiting 3 

months.





19 ROBERT drain placed and secured with 3 0 nylon.  Skin repaired with 4 0 monocryl.

 Dressing placed.





Patient tolerated procedure well and sent to PACU in stable condition.  All 

counts correct.  Wound class is 4.











GAMAL ZHOU MD             Apr 27, 2020 13:45

## 2020-04-27 NOTE — PDOC
PULMONARY PROGRESS NOTES


Subjective


Patient intubated on 3/23 , s/p trach 4/6, on vent


Taken to the OR earlier today.  She has a  drains in place


Vitals





Vital Signs








  Date Time  Temp Pulse Resp B/P (MAP) Pulse Ox O2 Delivery O2 Flow Rate FiO2


 


4/27/20 09:00  88 23 120/73 (89) 98 Ventilator  


 


4/27/20 08:00 100.2       





 100.2       








Comments


ros unable to obtain  on vent


General:  Alert


HEENT:  Other (nc at perrl   nose clear  nech  trach site ok  no lad   no 

thyromegaly)


Lungs:  Crackles, Other (dimished in BLL  nc at perrl   nose clear   neck trach 

site ok   no lad  no thyromegaly)


Cardiovascular:  S1, S2


Abdomen:  Soft, Non-tender, Other (distended)


Neuro Exam:  Alert


Extremities:  Other (+3 generalized edema )


Skin:  Warm, Dry


Labs





Laboratory Tests








Test


 4/25/20


12:02 4/25/20


17:54 4/26/20


00:25 4/26/20


05:45


 


Glucose (Fingerstick)


 142 mg/dL


(70-99) 128 mg/dL


(70-99) 129 mg/dL


(70-99) 137 mg/dL


(70-99)


 


Test


 4/26/20


06:00 4/26/20


12:09 4/26/20


17:28 4/27/20


00:41


 


White Blood Count


 9.3 x10^3/uL


(4.0-11.0) 


 


 





 


Red Blood Count


 2.60 x10^6/uL


(3.50-5.40) 


 


 





 


Hemoglobin


 7.7 g/dL


(12.0-15.5) 


 


 





 


Hematocrit


 23.6 %


(36.0-47.0) 


 


 





 


Mean Corpuscular Volume 91 fL ()    


 


Mean Corpuscular Hemoglobin 30 pg (25-35)    


 


Mean Corpuscular Hemoglobin


Concent 33 g/dL


(31-37) 


 


 





 


Red Cell Distribution Width


 18.3 %


(11.5-14.5) 


 


 





 


Platelet Count


 286 x10^3/uL


(140-400) 


 


 





 


Neutrophils (%) (Auto) 79 % (31-73)    


 


Lymphocytes (%) (Auto) 14 % (24-48)    


 


Monocytes (%) (Auto) 6 % (0-9)    


 


Eosinophils (%) (Auto) 1 % (0-3)    


 


Basophils (%) (Auto) 0 % (0-3)    


 


Neutrophils # (Auto)


 7.4 x10^3/uL


(1.8-7.7) 


 


 





 


Lymphocytes # (Auto)


 1.2 x10^3/uL


(1.0-4.8) 


 


 





 


Monocytes # (Auto)


 0.6 x10^3/uL


(0.0-1.1) 


 


 





 


Eosinophils # (Auto)


 0.1 x10^3/uL


(0.0-0.7) 


 


 





 


Basophils # (Auto)


 0.0 x10^3/uL


(0.0-0.2) 


 


 





 


Sodium Level


 149 mmol/L


(136-145) 


 


 





 


Potassium Level


 3.5 mmol/L


(3.5-5.1) 


 


 





 


Chloride Level


 110 mmol/L


() 


 


 





 


Carbon Dioxide Level


 28 mmol/L


(21-32) 


 


 





 


Anion Gap 11 (6-14)    


 


Blood Urea Nitrogen


 60 mg/dL


(7-20) 


 


 





 


Creatinine


 1.0 mg/dL


(0.6-1.0) 


 


 





 


Estimated GFR


(Cockcroft-Gault) 58.9 


 


 


 





 


Glucose Level


 142 mg/dL


(70-99) 


 


 





 


Calcium Level


 8.6 mg/dL


(8.5-10.1) 


 


 





 


Glucose (Fingerstick)


 


 148 mg/dL


(70-99) 144 mg/dL


(70-99) 134 mg/dL


(70-99)


 


Test


 4/27/20


04:30 


 


 





 


White Blood Count


 9.2 x10^3/uL


(4.0-11.0) 


 


 





 


Red Blood Count


 2.72 x10^6/uL


(3.50-5.40) 


 


 





 


Hemoglobin


 8.2 g/dL


(12.0-15.5) 


 


 





 


Hematocrit


 24.6 %


(36.0-47.0) 


 


 





 


Mean Corpuscular Volume 90 fL ()    


 


Mean Corpuscular Hemoglobin 30 pg (25-35)    


 


Mean Corpuscular Hemoglobin


Concent 33 g/dL


(31-37) 


 


 





 


Red Cell Distribution Width


 18.4 %


(11.5-14.5) 


 


 





 


Platelet Count


 323 x10^3/uL


(140-400) 


 


 





 


Neutrophils (%) (Auto) 78 % (31-73)    


 


Lymphocytes (%) (Auto) 15 % (24-48)    


 


Monocytes (%) (Auto) 6 % (0-9)    


 


Eosinophils (%) (Auto) 1 % (0-3)    


 


Basophils (%) (Auto) 0 % (0-3)    


 


Neutrophils # (Auto)


 7.1 x10^3/uL


(1.8-7.7) 


 


 





 


Lymphocytes # (Auto)


 1.4 x10^3/uL


(1.0-4.8) 


 


 





 


Monocytes # (Auto)


 0.5 x10^3/uL


(0.0-1.1) 


 


 





 


Eosinophils # (Auto)


 0.1 x10^3/uL


(0.0-0.7) 


 


 





 


Basophils # (Auto)


 0.0 x10^3/uL


(0.0-0.2) 


 


 





 


Sodium Level


 149 mmol/L


(136-145) 


 


 





 


Potassium Level


 3.6 mmol/L


(3.5-5.1) 


 


 





 


Chloride Level


 111 mmol/L


() 


 


 





 


Carbon Dioxide Level


 30 mmol/L


(21-32) 


 


 





 


Anion Gap 8 (6-14)    


 


Blood Urea Nitrogen


 57 mg/dL


(7-20) 


 


 





 


Creatinine


 1.1 mg/dL


(0.6-1.0) 


 


 





 


Estimated GFR


(Cockcroft-Gault) 52.8 


 


 


 





 


Glucose Level


 123 mg/dL


(70-99) 


 


 





 


Calcium Level


 8.5 mg/dL


(8.5-10.1) 


 


 





 


Creatine Kinase


 24 U/L


() 


 


 











Laboratory Tests








Test


 4/26/20


12:09 4/26/20


17:28 4/27/20


00:41 4/27/20


04:30


 


Glucose (Fingerstick)


 148 mg/dL


(70-99) 144 mg/dL


(70-99) 134 mg/dL


(70-99) 





 


White Blood Count


 


 


 


 9.2 x10^3/uL


(4.0-11.0)


 


Red Blood Count


 


 


 


 2.72 x10^6/uL


(3.50-5.40)


 


Hemoglobin


 


 


 


 8.2 g/dL


(12.0-15.5)


 


Hematocrit


 


 


 


 24.6 %


(36.0-47.0)


 


Mean Corpuscular Volume    90 fL () 


 


Mean Corpuscular Hemoglobin    30 pg (25-35) 


 


Mean Corpuscular Hemoglobin


Concent 


 


 


 33 g/dL


(31-37)


 


Red Cell Distribution Width


 


 


 


 18.4 %


(11.5-14.5)


 


Platelet Count


 


 


 


 323 x10^3/uL


(140-400)


 


Neutrophils (%) (Auto)    78 % (31-73) 


 


Lymphocytes (%) (Auto)    15 % (24-48) 


 


Monocytes (%) (Auto)    6 % (0-9) 


 


Eosinophils (%) (Auto)    1 % (0-3) 


 


Basophils (%) (Auto)    0 % (0-3) 


 


Neutrophils # (Auto)


 


 


 


 7.1 x10^3/uL


(1.8-7.7)


 


Lymphocytes # (Auto)


 


 


 


 1.4 x10^3/uL


(1.0-4.8)


 


Monocytes # (Auto)


 


 


 


 0.5 x10^3/uL


(0.0-1.1)


 


Eosinophils # (Auto)


 


 


 


 0.1 x10^3/uL


(0.0-0.7)


 


Basophils # (Auto)


 


 


 


 0.0 x10^3/uL


(0.0-0.2)


 


Sodium Level


 


 


 


 149 mmol/L


(136-145)


 


Potassium Level


 


 


 


 3.6 mmol/L


(3.5-5.1)


 


Chloride Level


 


 


 


 111 mmol/L


()


 


Carbon Dioxide Level


 


 


 


 30 mmol/L


(21-32)


 


Anion Gap    8 (6-14) 


 


Blood Urea Nitrogen


 


 


 


 57 mg/dL


(7-20)


 


Creatinine


 


 


 


 1.1 mg/dL


(0.6-1.0)


 


Estimated GFR


(Cockcroft-Gault) 


 


 


 52.8 





 


Glucose Level


 


 


 


 123 mg/dL


(70-99)


 


Calcium Level


 


 


 


 8.5 mg/dL


(8.5-10.1)


 


Creatine Kinase


 


 


 


 24 U/L


()








Medications





Active Scripts








 Medications  Dose


 Route/Sig


 Max Daily Dose Days Date Category


 


 Bisoprolol


 Fumarate 5 Mg


 Tablet  10 Mg


 PO DAILY


   3/16/20 Reported








Comments


cxr reviewed.





Impression


.


IMPRESSION:


1.  Acute hypoxemic respiratory failure secondary to ARDS status post trach,


2.  Gallstone pancreatitis


3.  Severe metabolic acidosis.stable


4.  Acute kidney injury-stable, ON HD-- continue to improve 


5.  Acute gallstone pancreatitis.


6.  Hypoalbuminemia.


7.  Moderate persistent effusions


8.  Fever-persist.  Per ID, per surgery


9.  Chronic anemia


10. Covid 19 testing negative


11. Moderate to large ascites-S/P paracentisis


S/P paracentisis with 4 liters removed on 4/15/20














Surgery note


Operative Note:


After obtaining informed consent, patient was taken to OR, induced under GETA 

and prepped in the usual fashion.  5 mm port placed umbilical and right mid 

abdomen, all under laparoscopic guidance.  Large amount of ascites encountered 

and aspirated off.  Fluid was clear with some whitish debris.  Viscera was 

completely locked in with obliteration of all planes, preventing any significant

exploration.  Copious irrigation.





Given patient's overall clinical improvement, favor against open procedure and 

attempt at cholecystectomy and/or necrosectomy, given high risk of 

complications.  Cholecystectomy will need to be performed, but favor waiting 3 

months.





19 ROBERT drain placed and secured with 3 0 nylon.  Skin repaired with 4 0 monocryl.

 Dressing placed.





Patient tolerated procedure well and sent to PACU in stable condition.  All 

counts correct.  Wound class is 4.





Plan


.


We will continue our efforts at weaning


Follow surgery input


hep sq and protonix for prophylaxis


Follow ID rec, abx per id


Follow nephrology recs 


Nutritional support per surgery


continue TPN for nutrition 


DVT/GI PPX 


am cxr


d/w RN/RT











STEVE MIRANDA MD              Apr 27, 2020 09:35

## 2020-04-27 NOTE — PDOC
Infectious Disease Note


Subjective:


Subjective


Patient back from surgery


Underwent drain placement


T-max 101


Remains on vent via trach, 


TPN





Vital Signs:


Vital Signs





Vital Signs








  Date Time  Temp Pulse Resp B/P (MAP) Pulse Ox O2 Delivery O2 Flow Rate FiO2


 


4/27/20 12:59     93 Ventilator  


 


4/27/20 11:00  76 24 155/76 (102)    


 


4/27/20 08:00 100.2       





 100.2       











Physical Exam:


PHYSICAL EXAM


GENERAL: Propped up in bed, sedated weak appearing 


HEENT: Pupils equal, + NGT, oral cavity dry 


NECK:  Trach/vent 


LUNGS: rhonchi 


HEART:  S1, S2, regular 


ABDOMEN: Distended, tender, hypoactive BS, drain placement (427 )


: [place] (4/14)


EXTREMITIES: Generalized edema, no cyanosis, SCDs bilaterally 


DERMATOLOGIC:  Warm and dry.  No generalized rash.  


CENTRAL NERVOUS SYSTEM: Extremely weak, nods to few simple questions 


HDC has been removed


[facility] (4/14) clean





Medications:


Inpatient Meds:





Current Medications








 Medications


  (Trade)  Dose


 Ordered  Sig/Yee  Start Time


 Stop Time Status Last Admin


Dose Admin


 


 Acetaminophen


  (Tylenol Supp)  650 mg  PRN Q6HRS  PRN  3/24/20 10:30


    4/27/20 00:32


650 MG


 


 Acetaminophen


  (Tylenol)  650 mg  PRN Q6HRS  PRN  3/21/20 03:36


    4/16/20 19:56


650 MG


 


 Albumin Human  200 ml @ 


 200 mls/hr  1X PRN  PRN  4/21/20 08:00


 4/21/20 13:59 DC 4/21/20 08:40


200 MLS/HR


 


 Albuterol Sulfate


  (Ventolin Neb


 Soln)  2.5 mg  1X  ONCE  3/17/20 22:30


 3/17/20 22:31 DC 3/18/20 00:56


2.5 MG


 


 Alteplase,


 Recombinant


  (Cathflo For


 Central Catheter


 Clearance)  1 mg  1X  ONCE  4/24/20 10:45


 4/24/20 10:46 DC 4/24/20 11:44


1 MG


 


 Amino Acids/


 Glycerin/


 Electrolytes  1,000 ml @ 


 75 mls/hr  [id number]  4/20/20 21:15


   UNV  





 


 Artificial Tears


  (Artificial


 Tears)  1 drop  PRN Q1HR  PRN  3/23/20 08:15


     





 


 Atenolol


  (Tenormin)  100 mg  DAILY  3/17/20 09:00


 3/16/20 20:08 DC  





 


 Atropine Sulfate


  (ATROPINE 0.5mg


 SYRINGE)  0.5 mg  PRN Q5MIN  PRN  4/2/20 08:15


     





 


 Benzocaine


  (Hurricaine One)  1 spray  1X  ONCE  3/20/20 14:30


 3/20/20 14:31 DC 3/20/20 16:38


1 SPRAY


 


 Bisacodyl


  (Dulcolax Supp)  10 mg  STK-MED ONCE  4/27/20 10:59


 4/27/20 10:59 DC  





 


 Bupivacaine HCl/


 Epinephrine Bitart


  (Sensorcain-Epi


 0.5%-1:[id number] Mpf)  30 ml  STK-MED ONCE  4/27/20 10:58


 4/27/20 10:58 DC 4/27/20 12:01


7 ML


 


 Calcium Carbonate/


 Glycine


  (Tums)  500 mg  PRN [id number]  PRN  3/18/20 17:45


     





 


 Calcium Chloride


 1000 mg/Sodium


 Chloride  110 ml @ 


 220 mls/hr  1X  ONCE  3/17/20 22:30


 3/17/20 22:59 DC 3/17/20 22:11


220 MLS/HR


 


 Calcium Chloride


 3000 mg/Sodium


 Chloride  1,030 ml @ 


 50 mls/hr  [id number]  3/19/20 08:00


 3/21/20 15:23 DC 3/21/20 02:17


50 MLS/HR


 


 Calcium Gluconate


  (Calcium


 Gluconate)  2,000 mg  1X  ONCE  3/19/20 02:15


 3/19/20 02:16 DC 3/19/20 02:19


2,000 MG


 


 Calcium Gluconate


 1000 mg/Sodium


 Chloride  110 ml @ 


 220 mls/hr  1X  ONCE  3/18/20 03:30


 3/18/20 03:59 DC 3/18/20 03:21


220 MLS/HR


 


 Calcium Gluconate


 2000 mg/Sodium


 Chloride  120 ml @ 


 220 mls/hr  1X  ONCE  3/18/20 07:30


 3/18/20 08:02 DC 3/18/20 09:05


220 MLS/HR


 


 Cefepime HCl


  (Maxipime)  2 gm  Q12HR  3/25/20 09:00


 4/8/20 09:58 DC 4/7/20 20:56


2 GM


 


 Cellulose


  (Surgicel


 Fibrillar 1x2)  1 each  STK-MED ONCE  4/6/20 11:00


 4/6/20 11:01 DC  





 


 Cellulose


  (Surgicel


 Hemostat 2x14)  1 each  STK-MED ONCE  4/27/20 10:58


 4/27/20 10:59 DC  





 


 Cellulose


  (Surgicel


 Hemostat 4x8)  1 each  STK-MED ONCE  4/27/20 10:58


 4/27/20 10:59 DC  





 


 Daptomycin 430 mg/


 Sodium Chloride  50 ml @ 


 100 mls/hr  Q24H  4/25/20 13:00


    4/26/20 14:13


100 MLS/HR


 


 Daptomycin 500 mg/


 Sodium Chloride  50 ml @ 


 100 mls/hr  Q48H  3/25/20 08:30


 4/10/20 10:07 DC 4/10/20 09:57


100 MLS/HR


 


 Dexamethasone


 Sodium Phosphate


  (Decadron)  4 mg  STK-MED ONCE  4/27/20 10:56


 4/27/20 10:57 DC  





 


 Dexmedetomidine


 HCl 400 mcg/


 Sodium Chloride  100 ml @ 0


 mls/hr  CONT  PRN  4/2/20 08:15


    4/27/20 10:18


33 MLS/HR


 


 Dextrose


  (Dextrose


 50%-Water Syringe)  12.5 gm  PRN Q15MIN  PRN  3/16/20 09:30


     





 


 Digoxin


  (Lanoxin)  125 mcg  1X  ONCE  3/19/20 18:00


 3/19/20 18:01 DC 3/19/20 17:10


125 MCG


 


 Diphenhydramine


 HCl


  (Benadryl)  25 mg  1X PRN  PRN  4/24/20 15:45


 4/25/20 15:44 DC  





 


 Enoxaparin Sodium


  (Lovenox 100mg


 Syringe)  100 mg  Q12HR  4/21/20 21:00


   UNV  





 


 Etomidate


  (Amidate)  8 mg  1X  ONCE  3/23/20 08:30


 3/23/20 08:31 DC 3/23/20 08:33


8 MG


 


 Fentanyl Citrate


  (Fentanyl 2ml


 Vial)  100 mcg  STK-MED ONCE  4/27/20 10:56


 4/27/20 10:57 DC  





 


 Furosemide


  (Lasix)  40 mg  1X  ONCE  4/13/20 14:30


 4/13/20 14:31 DC 4/13/20 14:39


40 MG


 


 Heparin Sodium


  (Porcine)


  (Hep Lock Adult)  500 unit  STK-MED ONCE  4/7/20 09:29


 4/7/20 09:30 DC  





 


 Heparin Sodium


  (Porcine)


  (Heparin Sodium)  5,000 unit  Q12HR  4/3/20 21:00


 4/26/20 10:05 DC 4/26/20 08:59


5,000 UNIT


 


 Heparin Sodium


  (Porcine) 1000


 unit/Sodium


 Chloride  1,001 ml @ 


 1,001 mls/hr  1X  ONCE  4/27/20 12:00


 4/27/20 12:59 DC  





 


 Hydromorphone HCl


  (Dilaudid)  1 mg  PRN Q3HRS  PRN  3/17/20 12:00


 3/31/20 00:25 DC 3/23/20 05:13


1 MG


 


 Info


  (CONTRAST GIVEN


 -- Rx MONITORING)  1 each  PRN DAILY  PRN  3/30/20 11:45


 4/1/20 11:44 DC  





 


 Info


  (Icu Electrolyte


 Protocol)  1 ea  CONT PRN  PRN  3/29/20 13:15


     





 


 Info


  (PHARMACY


 MONITORING -- do


 not chart)  1 each  PRN DAILY  PRN  4/24/20 15:45


     





 


 Info


  (Tpn Per


 Pharmacy)  1 each  PRN DAILY  PRN  3/18/20 12:30


   UNV  





 


 Insulin Human


 Lispro


  (HumaLOG)  0-9 UNITS  Q6HRS  3/16/20 09:30


    4/24/20 13:19


4 UNITS


 


 Insulin Human


 Regular


  (HumuLIN R VIAL)  5 unit  1X  ONCE  3/17/20 22:30


 3/17/20 22:31 DC 3/17/20 22:14


5 UNIT


 


 Iohexol


  (Omnipaque 240


 Mg/ml)  30 ml  1X  ONCE  3/30/20 11:30


 3/30/20 11:33 DC 3/30/20 11:30


30 ML


 


 Iohexol


  (Omnipaque 300


 Mg/ml)  50 ml  STK-MED ONCE  4/27/20 10:58


 4/27/20 10:59 DC  





 


 Iohexol


  (Omnipaque 350


 Mg/ml)  90 ml  1X  ONCE  3/16/20 03:30


 3/16/20 03:31 DC 3/16/20 03:25


90 ML


 


 Ketorolac


 Tromethamine


  (Toradol 30mg


 Vial)  30 mg  1X  ONCE  3/16/20 03:00


 3/16/20 03:01 DC 3/16/20 02:54


30 MG


 


 Lidocaine HCl


  (Buffered


 Lidocaine 1%)  6 ml  1X  ONCE  4/15/20 13:30


 4/15/20 13:31 DC 4/15/20 13:45


9 ML


 


 Lidocaine HCl


  (Glydo


  (Lidocaine) Jelly)  1 thomas  1X  ONCE  3/20/20 14:30


 3/20/20 14:31 DC 3/20/20 16:38


1 THOMAS


 


 Lidocaine HCl


  (Xylocaine-Mpf


 1% 2ml Vial)  2 ml  PRN 1X  PRN  4/27/20 07:00


 4/28/20 06:59   





 


 Linezolid/Dextrose  300 ml @ 


 300 mls/hr  Q12HR  4/10/20 11:00


 4/21/20 08:10 DC 4/20/20 20:40


300 MLS/HR


 


 Lorazepam


  (Ativan Inj)  1 mg  PRN Q4HRS  PRN  3/19/20 09:00


 4/17/20 09:19 DC 4/17/20 03:51


1 MG


 


 Magnesium Sulfate  50 ml @ 25


 mls/hr  PRN DAILY  PRN  4/5/20 09:15


    4/20/20 17:27


25 MLS/HR


 


 Meropenem 1 gm/


 Sodium Chloride  100 ml @ 


 200 mls/hr  Q8HRS  3/17/20 20:00


 3/18/20 08:48 DC 3/18/20 05:45


200 MLS/HR


 


 Meropenem 500 mg/


 Sodium Chloride  50 ml @ 


 100 mls/hr  Q12H  4/8/20 10:00


    4/27/20 09:24


100 MLS/HR


 


 Metoprolol


 Tartrate


  (Lopressor Vial)  5 mg  Q6HRS  3/17/20 10:15


 3/28/20 08:48 DC 3/26/20 00:12


5 MG


 


 Metronidazole  100 ml @ 


 100 mls/hr  Q8HRS  4/14/20 10:00


 4/21/20 08:10 DC 4/21/20 06:04


100 MLS/HR


 


 Micafungin Sodium


 100 mg/Dextrose  100 ml @ 


 100 mls/hr  Q24H  3/23/20 09:00


    4/27/20 08:09


100 MLS/HR


 


 Midazolam HCl


  (Versed)  5 mg  1X  ONCE  3/23/20 08:30


 3/23/20 08:31 DC  





 


 Midazolam HCl 100


 mg/Sodium Chloride  100 ml @ 7


 mls/hr  CONT  PRN  3/28/20 16:00


    4/8/20 15:35


7 MLS/HR


 


 Midazolam HCl 50


 mg/Sodium Chloride  50 ml @ 0


 mls/hr  CONT  PRN  3/23/20 08:15


 3/28/20 15:59 DC 3/26/20 22:39


7 MLS/HR


 


 Morphine Sulfate


  (Morphine


 Sulfate)  2 mg  PRN Q2HR  PRN  3/16/20 05:00


 3/17/20 14:15 DC 3/17/20 12:26


2 MG


 


 Multi-Ingred


 Cream/Lotion/Oil/


 Oint


  (Artificial


 Tears Eye


 Ointment)  1 thomas  PRN Q1HR  PRN  3/25/20 17:30


    4/13/20 08:19


1 THOMAS


 


 Norepinephrine


 Bitartrate 8 mg/


 Dextrose  258 ml @ 


 17.299 mls/


 hr  CONT  PRN  3/17/20 15:30


 4/17/20 09:19 DC 4/14/20 12:48


20.9 MLS/HR


 


 Ondansetron HCl


  (Zofran)  4 mg  STK-MED ONCE  4/27/20 10:56


 4/27/20 10:57 DC  





 


 Pantoprazole


 Sodium


  (PROTONIX VIAL


 for IV PUSH)  40 mg  DAILYAC  3/16/20 11:30


    4/27/20 08:08


40 MG


 


 Phenylephrine HCl


  (PHENYLEPHRINE


 in 0.9% NACL PF)  1 mg  STK-MED ONCE  4/27/20 12:34


 4/27/20 12:34 DC  





 


 Piperacillin Sod/


 Tazobactam Sod


 4.5 gm/Sodium


 Chloride  100 ml @ 


 200 mls/hr  1X  ONCE  3/16/20 06:00


 3/16/20 06:29 DC 3/16/20 05:44


200 MLS/HR


 


 Potassium


 Chloride 15 meq/


 Bicarbonate


 Dialysis Soln w/


 out KCl  5,007.5 ml


  @ 1,000 mls/


 hr  Q5H1M  3/29/20 20:00


 4/2/20 13:08 DC 4/1/20 18:14


1,000 MLS/HR


 


 Potassium


 Chloride 20 meq/


 Bicarbonate


 Dialysis Soln w/


 out KCl  5,010 ml @ 


 1,000 mls/hr  Q5H1M  3/25/20 16:00


 3/29/20 19:59 DC 3/29/20 14:54


1,000 MLS/HR


 


 Potassium


 Chloride/Water  100 ml @ 


 100 mls/hr  1X  ONCE  4/18/20 14:45


 4/18/20 15:44 DC 4/18/20 17:28


100 MLS/HR


 


 Potassium


 Phosphate 20 mmol/


 Sodium Chloride  106.6667


 ml @ 


 51.667 m...  1X  ONCE  3/25/20 13:00


 3/25/20 15:03 DC 3/25/20 12:51


51.667 MLS/HR


 


 Potassium Acetate


 55 meq/Magnesium


 Sulfate 20 meq/


 Calcium Gluconate


 10 meq/


 Multivitamins 10


 ml/Chromium/


 Copper/Manganese/


 Seleni/Zn 0.5 ml/


 Insulin Human


 Regular 35 unit/


 Total Parenteral


 Nutrition/Amino


 Acids/Dextrose/


 Fat Emulsion


 Intravenous  1,920 ml @ 


 80 mls/hr  TPN  CONT  4/26/20 22:00


 4/27/20 21:59  4/26/20 22:10


80 MLS/HR


 


 Prochlorperazine


 Edisylate


  (Compazine)  5 mg  PACU PRN  PRN  4/27/20 07:00


 4/28/20 06:59   





 


 Propofol  20 ml @ As


 Directed  STK-MED ONCE  4/27/20 12:26


 4/27/20 12:27 DC  





 


 Ringer's Solution  1,000 ml @ 


 30 mls/hr  Q24H  4/27/20 07:00


 4/27/20 18:59   





 


 Rocuronium Bromide


  (Zemuron)  50 mg  STK-MED ONCE  4/27/20 10:56


 4/27/20 10:57 DC  





 


 Sevoflurane


  (Ultane)  60 ml  STK-MED ONCE  4/27/20 12:26


 4/27/20 12:27 DC  





 


 Sodium


 Bicarbonate 50


 meq/Sodium


 Chloride  1,050 ml @ 


 75 mls/hr  Q14H  3/18/20 07:30


 3/23/20 10:28 DC 3/22/20 21:10


75 MLS/HR


 


 Sodium Acetate 50


 meq/Potassium


 Acetate 55 meq/


 Magnesium Sulfate


 20 meq/Calcium


 Gluconate 10 meq/


 Multivitamins 10


 ml/Chromium/


 Copper/Manganese/


 Seleni/Zn 0.5 ml/


 Insulin Human


 Regular 35 unit/


 Total Parenteral


 Nutrition/Amino


 Acids/Dextrose/


 Fat Emulsion


 Intravenous  1,800 ml @ 


 75 mls/hr  TPN  CONT  4/25/20 22:00


 4/26/20 21:59 DC 4/25/20 22:03


75 MLS/HR


 


 Sodium Chloride  1,000 ml @ 


 400 mls/hr  Q2H30M PRN  4/24/20 15:31


 4/25/20 03:30 DC  





 


 Sodium Chloride


  (Normal Saline


 Flush)  20 ml  PRN Q5MIN  PRN  4/14/20 15:00


     





 


 Sodium Chloride


 90 meq/Calcium


 Gluconate 10 meq/


 Multivitamins 10


 ml/Chromium/


 Copper/Manganese/


 Seleni/Zn 0.5 ml/


 Total Parenteral


 Nutrition/Amino


 Acids/Dextrose/


 Fat Emulsion


 Intravenous  1,512 ml @ 


 63 mls/hr  TPN  CONT  3/18/20 22:00


 3/19/20 21:59 DC 3/18/20 22:06


63 MLS/HR


 


 Sodium Chloride


 90 meq/Calcium


 Gluconate 10 meq/


 Multivitamins 10


 ml/Chromium/


 Copper/Manganese/


 Seleni/Zn 1 ml/


 Total Parenteral


 Nutrition/Amino


 Acids/Dextrose/


 Fat Emulsion


 Intravenous  55.005 ml


  @ 2.292


 mls/hr  TPN  CONT  3/18/20 22:00


 3/18/20 12:33 DC  





 


 Sodium Chloride


 90 meq/Magnesium


 Sulfate 10 meq/


 Calcium Gluconate


 20 meq/


 Multivitamins 10


 ml/Chromium/


 Copper/Manganese/


 Seleni/Zn 0.5 ml/


 Total Parenteral


 Nutrition/Amino


 Acids/Dextrose/


 Fat Emulsion


 Intravenous  1,512 ml @ 


 63 mls/hr  TPN  CONT  3/19/20 22:00


 3/20/20 21:59 DC 3/19/20 22:25


63 MLS/HR


 


 Sodium Chloride


 90 meq/Magnesium


 Sulfate 12 meq/


 Calcium Gluconate


 15 meq/


 Multivitamins 10


 ml/Chromium/


 Copper/Manganese/


 Seleni/Zn 0.5 ml/


 Insulin Human


 Regular 25 unit/


 Total Parenteral


 Nutrition/Amino


 Acids/Dextrose/


 Fat Emulsion


 Intravenous  1,400 ml @ 


 58.333 mls/


 hr  TPN  CONT  4/8/20 22:00


 4/9/20 21:59 DC 4/8/20 21:41


58.333 MLS/HR


 


 Sodium Chloride


 90 meq/Potassium


 Chloride 15 meq/


 Magnesium Sulfate


 12 meq/Calcium


 Gluconate 15 meq/


 Multivitamins 10


 ml/Chromium/


 Copper/Manganese/


 Seleni/Zn 0.5 ml/


 Insulin Human


 Regular 25 unit/


 Total Parenteral


 Nutrition/Amino


 Acids/Dextrose/


 Fat Emulsion


 Intravenous  1,400 ml @ 


 58.333 mls/


 hr  TPN  CONT  4/7/20 22:00


 4/8/20 21:59 DC 4/7/20 22:13


58.333 MLS/HR


 


 Sodium Chloride


 90 meq/Potassium


 Chloride 15 meq/


 Potassium


 Phosphate 10 mmol/


 Magnesium Sulfate


 8 meq/Calcium


 Gluconate 15 meq/


 Multivitamins 10


 ml/Chromium/


 Copper/Manganese/


 Seleni/Zn 0.5 ml/


 Insulin Human


 Regular 25 unit/


 Total Parenteral


 Nutrition/Amino


 Acids/Dextrose/


 Fat Emulsion


 Intravenous  1,400 ml @ 


 58.333 mls/


 hr  TPN  CONT  4/5/20 22:00


 4/6/20 21:59 DC 4/5/20 21:20


58.333 MLS/HR


 


 Sodium Chloride


 90 meq/Potassium


 Chloride 15 meq/


 Potassium


 Phosphate 10 mmol/


 Magnesium Sulfate


 10 meq/Calcium


 Gluconate 20 meq/


 Multivitamins 10


 ml/Chromium/


 Copper/Manganese/


 Seleni/Zn 0.5 ml/


 Total Parenteral


 Nutrition/Amino


 Acids/Dextrose/


 Fat Emulsion


 Intravenous  1,400 ml @ 


 58.333 mls/


 hr  TPN  CONT  3/23/20 22:00


 3/24/20 21:59 DC 3/23/20 21:42


58.333 MLS/HR


 


 Sodium Chloride


 90 meq/Potassium


 Chloride 15 meq/


 Potassium


 Phosphate 10 mmol/


 Magnesium Sulfate


 12 meq/Calcium


 Gluconate 15 meq/


 Multivitamins 10


 ml/Chromium/


 Copper/Manganese/


 Seleni/Zn 0.5 ml/


 Insulin Human


 Regular 25 unit/


 Total Parenteral


 Nutrition/Amino


 Acids/Dextrose/


 Fat Emulsion


 Intravenous  1,400 ml @ 


 58.333 mls/


 hr  TPN  CONT  4/6/20 22:00


 4/7/20 21:59 DC 4/6/20 22:24


58.333 MLS/HR


 


 Sodium Chloride


 90 meq/Potassium


 Chloride 15 meq/


 Potassium


 Phosphate 15 mmol/


 Magnesium Sulfate


 10 meq/Calcium


 Gluconate 15 meq/


 Multivitamins 10


 ml/Chromium/


 Copper/Manganese/


 Seleni/Zn 0.5 ml/


 Total Parenteral


 Nutrition/Amino


 Acids/Dextrose/


 Fat Emulsion


 Intravenous  1,400 ml @ 


 58.333 mls/


 hr  TPN  CONT  3/24/20 22:00


 3/25/20 21:59 DC 3/24/20 22:17


58.333 MLS/HR


 


 Sodium Chloride


 90 meq/Potassium


 Chloride 15 meq/


 Potassium


 Phosphate 15 mmol/


 Magnesium Sulfate


 10 meq/Calcium


 Gluconate 20 meq/


 Multivitamins 10


 ml/Chromium/


 Copper/Manganese/


 Seleni/Zn 0.5 ml/


 Total Parenteral


 Nutrition/Amino


 Acids/Dextrose/


 Fat Emulsion


 Intravenous  1,200 ml @ 


 50 mls/hr  TPN  CONT  3/22/20 22:00


 3/22/20 14:17 DC  





 


 Sodium Chloride


 90 meq/Potassium


 Chloride 15 meq/


 Potassium


 Phosphate 18 mmol/


 Magnesium Sulfate


 8 meq/Calcium


 Gluconate 15 meq/


 Multivitamins 10


 ml/Chromium/


 Copper/Manganese/


 Seleni/Zn 0.5 ml/


 Insulin Human


 Regular 10 unit/


 Total Parenteral


 Nutrition/Amino


 Acids/Dextrose/


 Fat Emulsion


 Intravenous  1,400 ml @ 


 58.333 mls/


 hr  TPN  CONT  3/27/20 22:00


 3/28/20 21:59 DC 3/27/20 21:43


58.333 MLS/HR


 


 Sodium Chloride


 90 meq/Potassium


 Chloride 15 meq/


 Potassium


 Phosphate 18 mmol/


 Magnesium Sulfate


 8 meq/Calcium


 Gluconate 15 meq/


 Multivitamins 10


 ml/Chromium/


 Copper/Manganese/


 Seleni/Zn 0.5 ml/


 Insulin Human


 Regular 15 unit/


 Total Parenteral


 Nutrition/Amino


 Acids/Dextrose/


 Fat Emulsion


 Intravenous  1,400 ml @ 


 58.333 mls/


 hr  TPN  CONT  3/30/20 22:00


 3/31/20 21:59 DC 3/30/20 21:47


58.333 MLS/HR


 


 Sodium Chloride


 90 meq/Potassium


 Chloride 15 meq/


 Potassium


 Phosphate 18 mmol/


 Magnesium Sulfate


 8 meq/Calcium


 Gluconate 15 meq/


 Multivitamins 10


 ml/Chromium/


 Copper/Manganese/


 Seleni/Zn 0.5 ml/


 Insulin Human


 Regular 20 unit/


 Total Parenteral


 Nutrition/Amino


 Acids/Dextrose/


 Fat Emulsion


 Intravenous  1,400 ml @ 


 58.333 mls/


 hr  TPN  CONT  4/2/20 22:00


 4/3/20 21:59 DC 4/2/20 22:45


58.333 MLS/HR


 


 Sodium Chloride


 90 meq/Potassium


 Chloride 15 meq/


 Potassium


 Phosphate 18 mmol/


 Magnesium Sulfate


 8 meq/Calcium


 Gluconate 15 meq/


 Multivitamins 10


 ml/Chromium/


 Copper/Manganese/


 Seleni/Zn 0.5 ml/


 Total Parenteral


 Nutrition/Amino


 Acids/Dextrose/


 Fat Emulsion


 Intravenous  1,400 ml @ 


 58.333 mls/


 hr  TPN  CONT  3/26/20 22:00


 3/27/20 21:59 DC 3/26/20 22:00


58.333 MLS/HR


 


 Sodium Chloride


 90 meq/Potassium


 Phosphate 15 mmol/


 Magnesium Sulfate


 12 meq/Calcium


 Gluconate 15 meq/


 Multivitamins 10


 ml/Chromium/


 Copper/Manganese/


 Seleni/Zn 0.5 ml/


 Insulin Human


 Regular 30 unit/


 Total Parenteral


 Nutrition/Amino


 Acids/Dextrose/


 Fat Emulsion


 Intravenous  1,400 ml @ 


 58.333 mls/


 hr  TPN  CONT  4/10/20 22:00


 4/11/20 21:59 DC 4/10/20 21:49


58.333 MLS/HR


 


 Sodium Chloride


 90 meq/Potassium


 Phosphate 15 mmol/


 Magnesium Sulfate


 12 meq/Calcium


 Gluconate 15 meq/


 Multivitamins 10


 ml/Chromium/


 Copper/Manganese/


 Seleni/Zn 0.5 ml/


 Insulin Human


 Regular 40 unit/


 Total Parenteral


 Nutrition/Amino


 Acids/Dextrose/


 Fat Emulsion


 Intravenous  1,400 ml @ 


 58.333 mls/


 hr  TPN  CONT  4/11/20 22:00


 4/12/20 21:59 DC 4/11/20 21:21


58.333 MLS/HR


 


 Sodium Chloride


 90 meq/Potassium


 Phosphate 19 mmol/


 Magnesium Sulfate


 12 meq/Calcium


 Gluconate 15 meq/


 Multivitamins 10


 ml/Chromium/


 Copper/Manganese/


 Seleni/Zn 0.5 ml/


 Insulin Human


 Regular 40 unit/


 Total Parenteral


 Nutrition/Amino


 Acids/Dextrose/


 Fat Emulsion


 Intravenous  1,400 ml @ 


 58.333 mls/


 hr  TPN  CONT  4/12/20 22:00


 4/13/20 21:59 DC 4/12/20 21:54


58.333 MLS/HR


 


 Sodium Chloride


 90 meq/Potassium


 Phosphate 5 mmol/


 Magnesium Sulfate


 12 meq/Calcium


 Gluconate 15 meq/


 Multivitamins 10


 ml/Chromium/


 Copper/Manganese/


 Seleni/Zn 0.5 ml/


 Insulin Human


 Regular 30 unit/


 Total Parenteral


 Nutrition/Amino


 Acids/Dextrose/


 Fat Emulsion


 Intravenous  1,400 ml @ 


 58.333 mls/


 hr  TPN  CONT  4/9/20 22:00


 4/10/20 21:59 DC 4/9/20 22:08


58.333 MLS/HR


 


 Sodium Chloride


 100 meq/Potassium


 Chloride 40 meq/


 Magnesium Sulfate


 15 meq/Calcium


 Gluconate 15 meq/


 Multivitamins 10


 ml/Chromium/


 Copper/Manganese/


 Seleni/Zn 0.5 ml/


 Insulin Human


 Regular 35 unit/


 Total Parenteral


 Nutrition/Amino


 Acids/Dextrose/


 Fat Emulsion


 Intravenous  1,400 ml @ 


 58.333 mls/


 hr  TPN  CONT  4/19/20 22:00


 4/20/20 21:59 DC 4/19/20 22:46


58.333 MLS/HR


 


 Sodium Chloride


 100 meq/Potassium


 Chloride 40 meq/


 Magnesium Sulfate


 20 meq/Calcium


 Gluconate 10 meq/


 Multivitamins 10


 ml/Chromium/


 Copper/Manganese/


 Seleni/Zn 0.5 ml/


 Insulin Human


 Regular 35 unit/


 Total Parenteral


 Nutrition/Amino


 Acids/Dextrose/


 Fat Emulsion


 Intravenous  1,400 ml @ 


 58.333 mls/


 hr  TPN  CONT  4/23/20 22:00


 4/24/20 21:59 DC 4/24/20 00:06


58.333 MLS/HR


 


 Sodium Chloride


 100 meq/Potassium


 Chloride 40 meq/


 Magnesium Sulfate


 20 meq/Calcium


 Gluconate 15 meq/


 Multivitamins 10


 ml/Chromium/


 Copper/Manganese/


 Seleni/Zn 0.5 ml/


 Insulin Human


 Regular 35 unit/


 Total Parenteral


 Nutrition/Amino


 Acids/Dextrose/


 Fat Emulsion


 Intravenous  1,400 ml @ 


 58.333 mls/


 hr  TPN  CONT  4/22/20 22:00


 4/23/20 21:59 DC 4/22/20 22:27


58.333 MLS/HR


 


 Sodium Chloride


 100 meq/Potassium


 Phosphate 10 mmol/


 Magnesium Sulfate


 12 meq/Calcium


 Gluconate 15 meq/


 Multivitamins 10


 ml/Chromium/


 Copper/Manganese/


 Seleni/Zn 0.5 ml/


 Insulin Human


 Regular 35 unit/


 Potassium


 Chloride 20 meq/


 Total Parenteral


 Nutrition/Amino


 Acids/Dextrose/


 Fat Emulsion


 Intravenous  1,400 ml @ 


 58.333 mls/


 hr  TPN  CONT  4/16/20 22:00


 4/17/20 21:59 DC 4/16/20 22:10


58.333 MLS/HR


 


 Sodium Chloride


 100 meq/Potassium


 Phosphate 19 mmol/


 Magnesium Sulfate


 12 meq/Calcium


 Gluconate 15 meq/


 Multivitamins 10


 ml/Chromium/


 Copper/Manganese/


 Seleni/Zn 0.5 ml/


 Insulin Human


 Regular 40 unit/


 Potassium


 Chloride 20 meq/


 Total Parenteral


 Nutrition/Amino


 Acids/Dextrose/


 Fat Emulsion


 Intravenous  1,400 ml @ 


 58.333 mls/


 hr  TPN  CONT  4/15/20 22:00


 4/16/20 21:59 DC 4/15/20 21:20


58.333 MLS/HR


 


 Sodium Chloride


 100 meq/Potassium


 Phosphate 5 mmol/


 Magnesium Sulfate


 12 meq/Calcium


 Gluconate 15 meq/


 Multivitamins 10


 ml/Chromium/


 Copper/Manganese/


 Seleni/Zn 0.5 ml/


 Insulin Human


 Regular 35 unit/


 Potassium


 Chloride 20 meq/


 Total Parenteral


 Nutrition/Amino


 Acids/Dextrose/


 Fat Emulsion


 Intravenous  1,400 ml @ 


 58.333 mls/


 hr  TPN  CONT  4/17/20 22:00


 4/18/20 21:59 DC 4/17/20 22:59


58.333 MLS/HR


 


 Succinylcholine


 Chloride


  (Anectine)  120 mg  1X  ONCE  3/23/20 08:30


 3/23/20 08:31 DC 3/23/20 08:34


120 MG











Labs:


Lab





Laboratory Tests








Test


 4/26/20


17:28 4/27/20


00:41 4/27/20


04:30


 


Glucose (Fingerstick)


 144 mg/dL


(70-99) 134 mg/dL


(70-99) 





 


White Blood Count


 


 


 9.2 x10^3/uL


(4.0-11.0)


 


Red Blood Count


 


 


 2.72 x10^6/uL


(3.50-5.40)


 


Hemoglobin


 


 


 8.2 g/dL


(12.0-15.5)


 


Hematocrit


 


 


 24.6 %


(36.0-47.0)


 


Mean Corpuscular Volume   90 fL () 


 


Mean Corpuscular Hemoglobin   30 pg (25-35) 


 


Mean Corpuscular Hemoglobin


Concent 


 


 33 g/dL


(31-37)


 


Red Cell Distribution Width


 


 


 18.4 %


(11.5-14.5)


 


Platelet Count


 


 


 323 x10^3/uL


(140-400)


 


Neutrophils (%) (Auto)   78 % (31-73) 


 


Lymphocytes (%) (Auto)   15 % (24-48) 


 


Monocytes (%) (Auto)   6 % (0-9) 


 


Eosinophils (%) (Auto)   1 % (0-3) 


 


Basophils (%) (Auto)   0 % (0-3) 


 


Neutrophils # (Auto)


 


 


 7.1 x10^3/uL


(1.8-7.7)


 


Lymphocytes # (Auto)


 


 


 1.4 x10^3/uL


(1.0-4.8)


 


Monocytes # (Auto)


 


 


 0.5 x10^3/uL


(0.0-1.1)


 


Eosinophils # (Auto)


 


 


 0.1 x10^3/uL


(0.0-0.7)


 


Basophils # (Auto)


 


 


 0.0 x10^3/uL


(0.0-0.2)


 


Sodium Level


 


 


 149 mmol/L


(136-145)


 


Potassium Level


 


 


 3.6 mmol/L


(3.5-5.1)


 


Chloride Level


 


 


 111 mmol/L


()


 


Carbon Dioxide Level


 


 


 30 mmol/L


(21-32)


 


Anion Gap   8 (6-14) 


 


Blood Urea Nitrogen


 


 


 57 mg/dL


(7-20)


 


Creatinine


 


 


 1.1 mg/dL


(0.6-1.0)


 


Estimated GFR


(Cockcroft-Gault) 


 


 52.8 





 


Glucose Level


 


 


 123 mg/dL


(70-99)


 


Calcium Level


 


 


 8.5 mg/dL


(8.5-10.1)


 


Creatine Kinase


 


 


 24 U/L


()











Objective:


Assessment:


FEVER again  


Acute pancreatitis with persistent  necrosis


CT a/p 4/9


    Increased ascites. Persistent evidence of necrotizing pancreatitis with 

fluid and phlegmon


   at the pancreas


   4/27 ROBERT drain placement


Cholelithiasis with thickening of the gallbladder wall.


Leucocytosis improving


JED,Hyperkalemia, Metabolic acidosis on HD


Acute hypoxic resp failure ,bilateral pleural effusion and atelectasis


hypocalcemia 


Prediabetes


HTN


s/p trach





Plan:


Plan of Care


cont merrem (4/8), micafungin, and dapto 


Monitor for abx toxicities. 


zyvox changed to dapto (4/22), to rule out serotonin sy causing fever


Maintain aspiration precautions 








D/w nursing





Critically ill











IVAN FRANZ MD           Apr 27, 2020 13:07

## 2020-04-27 NOTE — PDOC
PROGRESS NOTES


Chief Complaint


Chief Complaint


Respiratory failure requiring mechanical ventilation (on vent since 3/23)


Tracheostomy


bilateral pleural effusions/pulm edema 


Sepsis


Severe Acute gallstone pancreatitis (not a surgical candidate at this time) with

necrosis


Acute kidney failure now requiring dialysis


Salpingitis


Gallstones (Calculus of gallbladder with acute cholecystitis without 

obstruction)


HTN 


Leukocytosis 


Hypoxia


Uterine fibroid


Intractable pain


Intractable nausea


Covid 19 negative. 


Acute on chronic anemia 


EEG: No seizure activity


ESRD on HD


Hyperglycemia





History of Present Illness


History of Present Illness


Ms Tadeo is a 48yo F w/ PMHx HTN, prediabetes who presented to the emergency 

room with complaints of abdominal pain on 3/16/2020. Found with Lipase 20981, 

, , Bilirubin 1.4.


CT abdomen confirms pancreatic inflammation, peripancreatic fluid and 

inflammatory changes around the pancreas consistent with pancreatitis. 

Cholelithiasis and 1.4cm uterine fibroid as well as possible left salpingitis. 

Admitted for further care


GI, General surgery, ID, Pulm consulted.





3/17: PICC placed per IR. Renal US negative. Started on levophed. Repeat CT 

abdomen w/ necrosis; 3/18: Dialysis catheter per nephrology; 3/19: On BiPAP; 

3/20: BiPAP, dialysis; 3/21: Overnight Tmax 101.7 , still on BiPAP FiO2 40%, 

still on low dose Levophed gtt, TPN initiated. On dialysis


4/6: Tracheostomy; 4/12: S/p tracheostomy on vent spontaneous respirations with 

5 of pressure support 35% FiO2, rectal tube and a [place], off pressors; 

4/14:Still on vent via trach. Removed PICC and CVC LIJ and replaced. CT 

chest/abd/pelvis with bilateral pleural effusion and ascites.


4/15: Renal function stable. Still on vent. More interactive today. Miming wish 

for food. Plan discussed for thoracentesis/paracentesis with daughters today. 

They were under impression patient was doing worse due to a miscommunication 

which has been clarified over the phone. 4.3L removed.


4/17: Febrile overnight 101.8F. More interactive, still on vent. Asking for ice 

by miming; 4/18: Afebrile overnight. TMax last 24 hours 100.6F. Hb 7.1. 

Interactive when awake. 4/22: Transfusion 1u PRBC (6U total since admit)


4/23-4/26: TPN and precedex, vent.





Tmax 101F overnight. Hb 8.2. HD cath out since 4/24. Alert. On vent SIMV 35% 

FiO2. Surgery today planned lap naif with cholangiogram and pancreatic 

necrosectomy.





Plan:


Cont vent weaning, dialysis - will need replacement catheter tomorrow 


Surgery today


Trach shield during day if ok with pulm. Would recommend ABG after 4 hours to 

r/o CO2 retention, however and still vent overnight





Vitals


Vitals





Vital Signs








  Date Time  Temp Pulse Resp B/P (MAP) Pulse Ox O2 Delivery O2 Flow Rate FiO2


 


4/27/20 07:48     100 Ventilator  


 


4/27/20 06:00  74 18 130/68 (88)    


 


4/27/20 04:00 100.2       





 100.2       











Physical Exam


Physical Exam


GENERAL: Propped up in bed, awake, weak appearing 


HEENT: Pupils equal, + NGT, oral cavity dry 


NECK:  Trach/vent 


LUNGS: rhonchi 


HEART:  S1, S2, regular 


ABDOMEN: Distended, tender, hypoactive BS, 


: Chino (4/14)


EXTREMITIES: Generalized edema, no cyanosis, SCDs bilaterally 


DERMATOLOGIC:  Warm and dry.  No generalized rash.  


CENTRAL NERVOUS SYSTEM: Extremely weak, nods to few simple questions 


HDC has been removed


LIJ (4/14) clean


General:  Alert, Cooperative, No acute distress


Heart:  Regular rate, Normal S1, Other (increased rate)


Lungs:  Crackles, Other (dimished in BLL  nc at perrl   nose clear   neck trach 

site ok   no lad  no thyromegaly)


Abdomen:  Soft, Other (mild TTP)


Extremities:  Other (ANASARCA)


Skin:  Other (mottling noted to extremities )





Labs


LABS





Laboratory Tests








Test


 4/26/20


12:09 4/26/20


17:28 4/27/20


00:41 4/27/20


04:30


 


Glucose (Fingerstick)


 148 mg/dL


(70-99) 144 mg/dL


(70-99) 134 mg/dL


(70-99) 





 


White Blood Count


 


 


 


 9.2 x10^3/uL


(4.0-11.0)


 


Red Blood Count


 


 


 


 2.72 x10^6/uL


(3.50-5.40)


 


Hemoglobin


 


 


 


 8.2 g/dL


(12.0-15.5)


 


Hematocrit


 


 


 


 24.6 %


(36.0-47.0)


 


Mean Corpuscular Volume    90 fL () 


 


Mean Corpuscular Hemoglobin    30 pg (25-35) 


 


Mean Corpuscular Hemoglobin


Concent 


 


 


 33 g/dL


(31-37)


 


Red Cell Distribution Width


 


 


 


 18.4 %


(11.5-14.5)


 


Platelet Count


 


 


 


 323 x10^3/uL


(140-400)


 


Neutrophils (%) (Auto)    78 % (31-73) 


 


Lymphocytes (%) (Auto)    15 % (24-48) 


 


Monocytes (%) (Auto)    6 % (0-9) 


 


Eosinophils (%) (Auto)    1 % (0-3) 


 


Basophils (%) (Auto)    0 % (0-3) 


 


Neutrophils # (Auto)


 


 


 


 7.1 x10^3/uL


(1.8-7.7)


 


Lymphocytes # (Auto)


 


 


 


 1.4 x10^3/uL


(1.0-4.8)


 


Monocytes # (Auto)


 


 


 


 0.5 x10^3/uL


(0.0-1.1)


 


Eosinophils # (Auto)


 


 


 


 0.1 x10^3/uL


(0.0-0.7)


 


Basophils # (Auto)


 


 


 


 0.0 x10^3/uL


(0.0-0.2)


 


Sodium Level


 


 


 


 149 mmol/L


(136-145)


 


Potassium Level


 


 


 


 3.6 mmol/L


(3.5-5.1)


 


Chloride Level


 


 


 


 111 mmol/L


()


 


Carbon Dioxide Level


 


 


 


 30 mmol/L


(21-32)


 


Anion Gap    8 (6-14) 


 


Blood Urea Nitrogen


 


 


 


 57 mg/dL


(7-20)


 


Creatinine


 


 


 


 1.1 mg/dL


(0.6-1.0)


 


Estimated GFR


(Cockcroft-Gault) 


 


 


 52.8 





 


Glucose Level


 


 


 


 123 mg/dL


(70-99)


 


Calcium Level


 


 


 


 8.5 mg/dL


(8.5-10.1)


 


Creatine Kinase


 


 


 


 24 U/L


()











Assessment and Plan


Assessmemt and Plan


Problems


Medical Problems:


(1) Acute pancreatitis


Status: Acute  





(2) Cholelithiasis


Status: Acute  











Comment


Review of Relevant


I have reviewed the following items josy (where applicable) has been applied.


Labs





Laboratory Tests








Test


 4/25/20


12:02 4/25/20


17:54 4/26/20


00:25 4/26/20


05:45


 


Glucose (Fingerstick)


 142 mg/dL


(70-99) 128 mg/dL


(70-99) 129 mg/dL


(70-99) 137 mg/dL


(70-99)


 


Test


 4/26/20


06:00 4/26/20


12:09 4/26/20


17:28 4/27/20


00:41


 


White Blood Count


 9.3 x10^3/uL


(4.0-11.0) 


 


 





 


Red Blood Count


 2.60 x10^6/uL


(3.50-5.40) 


 


 





 


Hemoglobin


 7.7 g/dL


(12.0-15.5) 


 


 





 


Hematocrit


 23.6 %


(36.0-47.0) 


 


 





 


Mean Corpuscular Volume 91 fL ()    


 


Mean Corpuscular Hemoglobin 30 pg (25-35)    


 


Mean Corpuscular Hemoglobin


Concent 33 g/dL


(31-37) 


 


 





 


Red Cell Distribution Width


 18.3 %


(11.5-14.5) 


 


 





 


Platelet Count


 286 x10^3/uL


(140-400) 


 


 





 


Neutrophils (%) (Auto) 79 % (31-73)    


 


Lymphocytes (%) (Auto) 14 % (24-48)    


 


Monocytes (%) (Auto) 6 % (0-9)    


 


Eosinophils (%) (Auto) 1 % (0-3)    


 


Basophils (%) (Auto) 0 % (0-3)    


 


Neutrophils # (Auto)


 7.4 x10^3/uL


(1.8-7.7) 


 


 





 


Lymphocytes # (Auto)


 1.2 x10^3/uL


(1.0-4.8) 


 


 





 


Monocytes # (Auto)


 0.6 x10^3/uL


(0.0-1.1) 


 


 





 


Eosinophils # (Auto)


 0.1 x10^3/uL


(0.0-0.7) 


 


 





 


Basophils # (Auto)


 0.0 x10^3/uL


(0.0-0.2) 


 


 





 


Sodium Level


 149 mmol/L


(136-145) 


 


 





 


Potassium Level


 3.5 mmol/L


(3.5-5.1) 


 


 





 


Chloride Level


 110 mmol/L


() 


 


 





 


Carbon Dioxide Level


 28 mmol/L


(21-32) 


 


 





 


Anion Gap 11 (6-14)    


 


Blood Urea Nitrogen


 60 mg/dL


(7-20) 


 


 





 


Creatinine


 1.0 mg/dL


(0.6-1.0) 


 


 





 


Estimated GFR


(Cockcroft-Gault) 58.9 


 


 


 





 


Glucose Level


 142 mg/dL


(70-99) 


 


 





 


Calcium Level


 8.6 mg/dL


(8.5-10.1) 


 


 





 


Glucose (Fingerstick)


 


 148 mg/dL


(70-99) 144 mg/dL


(70-99) 134 mg/dL


(70-99)


 


Test


 4/27/20


04:30 


 


 





 


White Blood Count


 9.2 x10^3/uL


(4.0-11.0) 


 


 





 


Red Blood Count


 2.72 x10^6/uL


(3.50-5.40) 


 


 





 


Hemoglobin


 8.2 g/dL


(12.0-15.5) 


 


 





 


Hematocrit


 24.6 %


(36.0-47.0) 


 


 





 


Mean Corpuscular Volume 90 fL ()    


 


Mean Corpuscular Hemoglobin 30 pg (25-35)    


 


Mean Corpuscular Hemoglobin


Concent 33 g/dL


(31-37) 


 


 





 


Red Cell Distribution Width


 18.4 %


(11.5-14.5) 


 


 





 


Platelet Count


 323 x10^3/uL


(140-400) 


 


 





 


Neutrophils (%) (Auto) 78 % (31-73)    


 


Lymphocytes (%) (Auto) 15 % (24-48)    


 


Monocytes (%) (Auto) 6 % (0-9)    


 


Eosinophils (%) (Auto) 1 % (0-3)    


 


Basophils (%) (Auto) 0 % (0-3)    


 


Neutrophils # (Auto)


 7.1 x10^3/uL


(1.8-7.7) 


 


 





 


Lymphocytes # (Auto)


 1.4 x10^3/uL


(1.0-4.8) 


 


 





 


Monocytes # (Auto)


 0.5 x10^3/uL


(0.0-1.1) 


 


 





 


Eosinophils # (Auto)


 0.1 x10^3/uL


(0.0-0.7) 


 


 





 


Basophils # (Auto)


 0.0 x10^3/uL


(0.0-0.2) 


 


 





 


Sodium Level


 149 mmol/L


(136-145) 


 


 





 


Potassium Level


 3.6 mmol/L


(3.5-5.1) 


 


 





 


Chloride Level


 111 mmol/L


() 


 


 





 


Carbon Dioxide Level


 30 mmol/L


(21-32) 


 


 





 


Anion Gap 8 (6-14)    


 


Blood Urea Nitrogen


 57 mg/dL


(7-20) 


 


 





 


Creatinine


 1.1 mg/dL


(0.6-1.0) 


 


 





 


Estimated GFR


(Cockcroft-Gault) 52.8 


 


 


 





 


Glucose Level


 123 mg/dL


(70-99) 


 


 





 


Calcium Level


 8.5 mg/dL


(8.5-10.1) 


 


 





 


Creatine Kinase


 24 U/L


() 


 


 











Laboratory Tests








Test


 4/26/20


12:09 4/26/20


17:28 4/27/20


00:41 4/27/20


04:30


 


Glucose (Fingerstick)


 148 mg/dL


(70-99) 144 mg/dL


(70-99) 134 mg/dL


(70-99) 





 


White Blood Count


 


 


 


 9.2 x10^3/uL


(4.0-11.0)


 


Red Blood Count


 


 


 


 2.72 x10^6/uL


(3.50-5.40)


 


Hemoglobin


 


 


 


 8.2 g/dL


(12.0-15.5)


 


Hematocrit


 


 


 


 24.6 %


(36.0-47.0)


 


Mean Corpuscular Volume    90 fL () 


 


Mean Corpuscular Hemoglobin    30 pg (25-35) 


 


Mean Corpuscular Hemoglobin


Concent 


 


 


 33 g/dL


(31-37)


 


Red Cell Distribution Width


 


 


 


 18.4 %


(11.5-14.5)


 


Platelet Count


 


 


 


 323 x10^3/uL


(140-400)


 


Neutrophils (%) (Auto)    78 % (31-73) 


 


Lymphocytes (%) (Auto)    15 % (24-48) 


 


Monocytes (%) (Auto)    6 % (0-9) 


 


Eosinophils (%) (Auto)    1 % (0-3) 


 


Basophils (%) (Auto)    0 % (0-3) 


 


Neutrophils # (Auto)


 


 


 


 7.1 x10^3/uL


(1.8-7.7)


 


Lymphocytes # (Auto)


 


 


 


 1.4 x10^3/uL


(1.0-4.8)


 


Monocytes # (Auto)


 


 


 


 0.5 x10^3/uL


(0.0-1.1)


 


Eosinophils # (Auto)


 


 


 


 0.1 x10^3/uL


(0.0-0.7)


 


Basophils # (Auto)


 


 


 


 0.0 x10^3/uL


(0.0-0.2)


 


Sodium Level


 


 


 


 149 mmol/L


(136-145)


 


Potassium Level


 


 


 


 3.6 mmol/L


(3.5-5.1)


 


Chloride Level


 


 


 


 111 mmol/L


()


 


Carbon Dioxide Level


 


 


 


 30 mmol/L


(21-32)


 


Anion Gap    8 (6-14) 


 


Blood Urea Nitrogen


 


 


 


 57 mg/dL


(7-20)


 


Creatinine


 


 


 


 1.1 mg/dL


(0.6-1.0)


 


Estimated GFR


(Cockcroft-Gault) 


 


 


 52.8 





 


Glucose Level


 


 


 


 123 mg/dL


(70-99)


 


Calcium Level


 


 


 


 8.5 mg/dL


(8.5-10.1)


 


Creatine Kinase


 


 


 


 24 U/L


()








Microbiology


4/15/20 Aerobic and Anaerobic Culture - Final, Complete


          


4/15/20 Anaerobic Culture Result 1 ([facility]SON) - Final, Complete


          


4/15/20 Aerobic Culture - Final, Complete


          


4/15/20 Aerobic Culture Result 1 ([facility]SON) - Final, Complete


          


4/15/20 Gram Stain - Final, Complete


          


4/15/20 Gram Stain Result 1 ([facility]SON) - Final, Complete


          


4/15/20 Gram Stain Result 2 ([facility]SON) - Final, Complete


          


4/14/20 Blood Culture - Final, Complete


          NO GROWTH AFTER 5 DAYS


4/12/20 Urine Culture - Final, Complete


          


4/12/20 Urine Culture Result 1 ([facility]SON) - Final, Complete


Medications





Current Medications


Sodium Chloride 1,000 ml @  1,000 mls/hr Q1H IV  Last administered on 3/16/20at 

03:00;  Start 3/16/20 at 03:00;  Stop 3/16/20 at 03:59;  Status DC


Ondansetron HCl (Zofran) 4 mg 1X  ONCE IVP  Last administered on 3/16/20at 

03:27;  Start 3/16/20 at 03:00;  Stop 3/16/20 at 03:01;  Status DC


Morphine Sulfate (Morphine Sulfate) 4 mg 1X  ONCE IV ;  Start 3/16/20 at 03:00; 

Stop 3/16/20 at 03:01;  Status Cancel


Ketorolac Tromethamine (Toradol 30mg Vial) 30 mg 1X  ONCE IV  Last administered 

on 3/16/20at 02:54;  Start 3/16/20 at 03:00;  Stop 3/16/20 at 03:01;  Status DC


Fentanyl Citrate (Fentanyl 2ml Vial) 25 mcg 1X  ONCE IVP  Last administered on 

3/16/20at 03:23;  Start 3/16/20 at 03:30;  Stop 3/16/20 at 03:31;  Status DC


Fentanyl Citrate (Fentanyl 2ml Vial) 100 mcg STK-MED ONCE .ROUTE ;  Start 

3/16/20 at 03:18;  Stop 3/16/20 at 03:18;  Status DC


Iohexol (Omnipaque 350 Mg/ml) 90 ml 1X  ONCE IV  Last administered on 3/16/20at 

03:25;  Start 3/16/20 at 03:30;  Stop 3/16/20 at 03:31;  Status DC


Info (CONTRAST GIVEN -- Rx MONITORING) 1 each PRN DAILY  PRN MC SEE COMMENTS;  

Start 3/16/20 at 03:30;  Stop 3/18/20 at 03:29;  Status DC


Hydromorphone HCl (Dilaudid) 0.5 mg 1X  ONCE IV  Last administered on 3/16/20at 

03:55;  Start 3/16/20 at 04:30;  Stop 3/16/20 at 04:32;  Status DC


Ondansetron HCl (Zofran) 4 mg PRN Q8HRS  PRN IV NAUSEA/VOMITING 1ST CHOICE;  

Start 3/16/20 at 05:00;  Stop 3/16/20 at 09:27;  Status DC


Morphine Sulfate (Morphine Sulfate) 2 mg PRN Q2HR  PRN IV SEVERE PAIN 7-10 Last 

administered on 3/17/20at 12:26;  Start 3/16/20 at 05:00;  Stop 3/17/20 at 

14:15;  Status DC


Sodium Chloride 1,000 ml @  125 mls/hr Q8H IV  Last administered on 3/16/20at 

20:56;  Start 3/16/20 at 05:00;  Stop 3/17/20 at 04:59;  Status DC


Hydromorphone HCl (Dilaudid) 0.5 mg PRN Q3HRS  PRN IV SEVERE PAIN 7-10 Last 

administered on 3/17/20at 10:06;  Start 3/16/20 at 05:00;  Stop 3/17/20 at 

12:01;  Status DC


Piperacillin Sod/ Tazobactam Sod 4.5 gm/Sodium Chloride 100 ml @  200 mls/hr 1X 

ONCE IV  Last administered on 3/16/20at 05:44;  Start 3/16/20 at 06:00;  Stop 

3/16/20 at 06:29;  Status DC


Ondansetron HCl (Zofran) 4 mg PRN Q4HRS  PRN IV NAUSEA/VOMITING 1ST CHOICE Last 

administered on 4/24/20at 11:01;  Start 3/16/20 at 09:30


Insulin Human Lispro (HumaLOG) 0-9 UNITS Q6HRS SQ  Last administered on 

4/24/20at 13:19;  Start 3/16/20 at 09:30


Dextrose (Dextrose 50%-Water Syringe) 12.5 gm PRN Q15MIN  PRN IV SEE COMMENTS;  

Start 3/16/20 at 09:30


Pantoprazole Sodium (PROTONIX VIAL for IV PUSH) 40 mg DAILYAC IVP  Last 

administered on 4/27/20at 08:08;  Start 3/16/20 at 11:30


Prochlorperazine Edisylate (Compazine) 10 mg PRN Q6HRS  PRN IV NAUSEA/VOMITING, 

2nd CHOICE Last administered on 4/24/20at 08:42;  Start 3/16/20 at 17:45


Atenolol (Tenormin) 100 mg DAILY PO ;  Start 3/17/20 at 09:00;  Stop 3/16/20 at 

20:08;  Status DC


Metoprolol Tartrate (Lopressor Vial) 2.5 mg Q6HRS IVP  Last administered on 

3/17/20at 05:51;  Start 3/16/20 at 20:15;  Stop 3/17/20 at 10:02;  Status DC


Metoprolol Tartrate (Lopressor Vial) 5 mg Q6HRS IVP  Last administered on 

3/26/20at 00:12;  Start 3/17/20 at 10:15;  Stop 3/28/20 at 08:48;  Status DC


Hydromorphone HCl (Dilaudid) 1 mg PRN Q3HRS  PRN IV SEVERE PAIN 7-10 Last 

administered on 3/23/20at 05:13;  Start 3/17/20 at 12:00;  Stop 3/31/20 at 

00:25;  Status DC


Lidocaine HCl (Buffered Lidocaine 1%) 3 ml STK-MED ONCE .ROUTE ;  Start 3/17/20 

at 12:55;  Stop 3/17/20 at 12:56;  Status DC


Albumin Human 500 ml @  125 mls/hr 1X  ONCE IV  Last administered on 3/17/20at 

14:33;  Start 3/17/20 at 14:30;  Stop 3/17/20 at 18:32;  Status DC


Norepinephrine Bitartrate 8 mg/ Dextrose 258 ml @  17.299 mls/ hr CONT  PRN IV 

PER PROTOCOL Last administered on 4/14/20at 12:48;  Start 3/17/20 at 15:30;  

Stop 4/17/20 at 09:19;  Status DC


Sodium Chloride 1,000 ml @  125 mls/hr Q8H IV  Last administered on 3/17/20at 

21:04;  Start 3/17/20 at 16:00;  Stop 3/18/20 at 02:42;  Status DC


Albumin Human 500 ml @  125 mls/hr PRN BID  PRN IV After every 2L NSS & BP < 

90mm Last administered on 4/1/20at 14:21;  Start 3/17/20 at 16:00


Iohexol (Omnipaque 300 Mg/ml) 60 ml 1X  ONCE IV  Last administered on 3/17/20at 

17:20;  Start 3/17/20 at 17:00;  Stop 3/17/20 at 17:01;  Status DC


Info (CONTRAST GIVEN -- Rx MONITORING) 1 each PRN DAILY  PRN MC SEE COMMENTS;  

Start 3/17/20 at 17:00;  Stop 3/19/20 at 16:59;  Status DC


Meropenem 1 gm/ Sodium Chloride 100 ml @  200 mls/hr Q8HRS IV  Last administered

on 3/18/20at 05:45;  Start 3/17/20 at 20:00;  Stop 3/18/20 at 08:48;  Status DC


Furosemide (Lasix) 40 mg 1X  ONCE IVP  Last administered on 3/17/20at 22:12;  

Start 3/17/20 at 22:30;  Stop 3/17/20 at 22:31;  Status DC


Calcium Chloride 1000 mg/Sodium Chloride 110 ml @  220 mls/hr 1X  ONCE IV  Last 

administered on 3/17/20at 22:11;  Start 3/17/20 at 22:30;  Stop 3/17/20 at 

22:59;  Status DC


Albuterol Sulfate (Ventolin Neb Soln) 2.5 mg 1X  ONCE NEB  Last administered on 

3/18/20at 00:56;  Start 3/17/20 at 22:30;  Stop 3/17/20 at 22:31;  Status DC


Insulin Human Regular (HumuLIN R VIAL) 5 unit 1X  ONCE IV  Last administered on 

3/17/20at 22:14;  Start 3/17/20 at 22:30;  Stop 3/17/20 at 22:31;  Status DC


Magnesium Sulfate 50 ml @ 25 mls/hr 1X  ONCE IV  Last administered on 3/18/20at 

02:57;  Start 3/18/20 at 03:00;  Stop 3/18/20 at 04:59;  Status DC


Calcium Gluconate 1000 mg/Sodium Chloride 110 ml @  220 mls/hr 1X  ONCE IV  Last

administered on 3/18/20at 02:46;  Start 3/18/20 at 03:00;  Stop 3/18/20 at 

03:29;  Status DC


Sodium Chloride 1,000 ml @  200 mls/hr Q5H IV  Last administered on 3/18/20at 

02:46;  Start 3/18/20 at 03:00;  Stop 3/18/20 at 10:21;  Status DC


Calcium Gluconate 1000 mg/Sodium Chloride 110 ml @  220 mls/hr 1X  ONCE IV  Last

administered on 3/18/20at 03:21;  Start 3/18/20 at 03:30;  Stop 3/18/20 at 

03:59;  Status DC


Sodium Bicarbonate 50 meq/Sodium Chloride 1,050 ml @  75 mls/hr Q14H IV  Last 

administered on 3/22/20at 21:10;  Start 3/18/20 at 07:30;  Stop 3/23/20 at 

10:28;  Status DC


Calcium Gluconate 2000 mg/Sodium Chloride 120 ml @  220 mls/hr 1X  ONCE IV  Last

administered on 3/18/20at 09:05;  Start 3/18/20 at 07:30;  Stop 3/18/20 at 

08:02;  Status DC


Lidocaine HCl (Xylocaine-Mpf 1% 2ml Vial) 2 ml STK-MED ONCE .ROUTE ;  Start 

3/18/20 at 08:47;  Stop 3/18/20 at 08:47;  Status DC


Meropenem 500 mg/ Sodium Chloride 50 ml @  100 mls/hr Q12HR IV  Last 

administered on 3/23/20at 21:01;  Start 3/18/20 at 18:00;  Stop 3/24/20 at 

07:58;  Status DC


Lidocaine HCl (Buffered Lidocaine 1%) 3 ml STK-MED ONCE .ROUTE ;  Start 3/18/20 

at 09:46;  Stop 3/18/20 at 09:46;  Status DC


Lidocaine HCl (Buffered Lidocaine 1%) 6 ml 1X  ONCE INJ  Last administered on 

3/18/20at 10:26;  Start 3/18/20 at 10:15;  Stop 3/18/20 at 10:16;  Status DC


Info (Tpn Per Pharmacy) 1 each PRN DAILY  PRN MC SEE COMMENTS Last administered 

on 4/26/20at 13:34;  Start 3/18/20 at 12:00


Sodium Chloride 1,000 ml @  1,000 mls/hr Q1H PRN IV hypotension;  Start 3/18/20 

at 12:07;  Stop 3/18/20 at 18:06;  Status DC


Diphenhydramine HCl (Benadryl) 25 mg 1X PRN  PRN IV ITCHING;  Start 3/18/20 at 

12:15;  Stop 3/19/20 at 12:14;  Status DC


Diphenhydramine HCl (Benadryl) 25 mg 1X PRN  PRN IV ITCHING;  Start 3/18/20 at 

12:15;  Stop 3/19/20 at 12:14;  Status DC


Sodium Chloride 1,000 ml @  400 mls/hr Q2H30M PRN IV PATENCY;  Start 3/18/20 at 

12:07;  Stop 3/19/20 at 00:06;  Status DC


Info (PHARMACY MONITORING -- do not chart) 1 each PRN DAILY  PRN MC SEE 

COMMENTS;  Start 3/18/20 at 12:15;  Stop 3/20/20 at 08:13;  Status DC


Sodium Chloride 90 meq/Calcium Gluconate 10 meq/ Multivitamins 10 ml/Chromium/ 

Copper/Manganese/ Seleni/Zn 1 ml/ Total Parenteral Nutrition/Amino Acids/

Dextrose/ Fat Emulsion Intravenous 55.005 ml  @ 2.292 mls/hr TPN  CONT IV ;  

Start 3/18/20 at 22:00;  Stop 3/18/20 at 12:33;  Status DC


Info (Tpn Per Pharmacy) 1 each PRN DAILY  PRN MC SEE COMMENTS;  Start 3/18/20 at

12:30;  Status UNV


Sodium Chloride 90 meq/Calcium Gluconate 10 meq/ Multivitamins 10 ml/Chromium/ 

Copper/Manganese/ Seleni/Zn 0.5 ml/ Total Parenteral Nutrition/Amino 

Acids/Dextrose/ Fat Emulsion Intravenous 1,512 ml @  63 mls/hr TPN  CONT IV  

Last administered on 3/18/20at 22:06;  Start 3/18/20 at 22:00;  Stop 3/19/20 at 

21:59;  Status DC


Calcium Carbonate/ Glycine (Tums) 500 mg PRN AFTMEALHC  PRN PO INDIGESTION;  

Start 3/18/20 at 17:45


Calcium Gluconate (Calcium Gluconate) 2,000 mg 1X  ONCE IVP  Last administered 

on 3/19/20at 02:19;  Start 3/19/20 at 02:15;  Stop 3/19/20 at 02:16;  Status DC


Calcium Chloride 3000 mg/Sodium Chloride 1,030 ml @  50 mls/hr [id number] IV  Last 

administered on 3/21/20at 02:17;  Start 3/19/20 at 08:00;  Stop 3/21/20 at 

15:23;  Status DC


Lorazepam (Ativan Inj) 1 mg PRN Q4HRS  PRN IVP ANXIETY / AGITATION, 2nd choic 

Last administered on 4/17/20at 03:51;  Start 3/19/20 at 09:00;  Stop 4/17/20 at 

09:19;  Status DC


Sodium Chloride 1,000 ml @  1,000 mls/hr Q1H PRN IV hypotension;  Start 3/19/20 

at 08:56;  Stop 3/19/20 at 14:55;  Status DC


Albumin Human 200 ml @  200 mls/hr 1X PRN  PRN IV Hypotension;  Start 3/19/20 at

09:00;  Stop 3/19/20 at 14:59;  Status DC


Diphenhydramine HCl (Benadryl) 25 mg 1X PRN  PRN IV ITCHING;  Start 3/19/20 at 

09:00;  Stop 3/20/20 at 08:59;  Status DC


Diphenhydramine HCl (Benadryl) 25 mg 1X PRN  PRN IV ITCHING;  Start 3/19/20 at 

09:00;  Stop 3/20/20 at 08:59;  Status DC


Sodium Chloride 1,000 ml @  400 mls/hr Q2H30M PRN IV PATENCY;  Start 3/19/20 at 

08:56;  Stop 3/19/20 at 20:55;  Status DC


Info (PHARMACY MONITORING -- do not chart) 1 each PRN DAILY  PRN MC SEE 

COMMENTS;  Start 3/19/20 at 09:00;  Status UNV


Info (PHARMACY MONITORING -- do not chart) 1 each PRN DAILY  PRN MC SEE 

COMMENTS;  Start 3/19/20 at 09:00;  Stop 3/20/20 at 08:13;  Status DC


Digoxin (Lanoxin) 500 mcg 1X  ONCE IV  Last administered on 3/19/20at 10:04;  

Start 3/19/20 at 10:00;  Stop 3/19/20 at 10:01;  Status DC


Digoxin (Lanoxin) 125 mcg 1X  ONCE IV  Last administered on 3/19/20at 17:10;  

Start 3/19/20 at 18:00;  Stop 3/19/20 at 18:01;  Status DC


Magnesium Sulfate 100 ml @  25 mls/hr 1X  ONCE IV  Last administered on 

3/19/20at 12:48;  Start 3/19/20 at 13:00;  Stop 3/19/20 at 16:59;  Status DC


Sodium Chloride 90 meq/Magnesium Sulfate 10 meq/ Calcium Gluconate 20 meq/ 

Multivitamins 10 ml/Chromium/ Copper/Manganese/ Seleni/Zn 0.5 ml/ Total 

Parenteral Nutrition/Amino Acids/Dextrose/ Fat Emulsion Intravenous 1,512 ml @  

63 mls/hr TPN  CONT IV  Last administered on 3/19/20at 22:25;  Start 3/19/20 at 

22:00;  Stop 3/20/20 at 21:59;  Status DC


Sodium Chloride 1,000 ml @  1,000 mls/hr Q1H PRN IV hypotension;  Start 3/20/20 

at 08:05;  Stop 3/20/20 at 14:04;  Status DC


Albumin Human 200 ml @  200 mls/hr 1X  ONCE IV  Last administered on 3/20/20at 

08:57;  Start 3/20/20 at 08:15;  Stop 3/20/20 at 09:14;  Status DC


Diphenhydramine HCl (Benadryl) 25 mg 1X PRN  PRN IV ITCHING;  Start 3/20/20 at 

08:15;  Stop 3/21/20 at 08:14;  Status DC


Diphenhydramine HCl (Benadryl) 25 mg 1X PRN  PRN IV ITCHING;  Start 3/20/20 at 

08:15;  Stop 3/21/20 at 08:14;  Status DC


Sodium Chloride 1,000 ml @  400 mls/hr Q2H30M PRN IV PATENCY;  Start 3/20/20 at 

08:05;  Stop 3/20/20 at 20:04;  Status DC


Info (PHARMACY MONITORING -- do not chart) 1 each PRN DAILY  PRN MC SEE 

COMMENTS;  Start 3/20/20 at 08:15;  Stop 3/24/20 at 07:57;  Status DC


Sodium Chloride 90 meq/Potassium Chloride 15 meq/ Potassium Phosphate 10 mmol/ 

Magnesium Sulfate 10 meq/Calcium Gluconate 20 meq/ Multivitamins 10 ml/Chromium/

Copper/Manganese/ Seleni/Zn 0.5 ml/ Total Parenteral Nutrition/Amino 

Acids/Dextrose/ Fat Emulsion Intravenous 1,512 ml @  63 mls/hr TPN  CONT IV  

Last administered on 3/20/20at 21:01;  Start 3/20/20 at 22:00;  Stop 3/21/20 at 

21:59;  Status DC


Potassium Chloride/Water 100 ml @  100 mls/hr 1X  ONCE IV  Last administered on 

3/20/20at 14:09;  Start 3/20/20 at 14:00;  Stop 3/20/20 at 14:59;  Status DC


Benzocaine (Hurricaine One) 1 spray 1X  ONCE MM  Last administered on 3/20/20at 

16:38;  Start 3/20/20 at 14:30;  Stop 3/20/20 at 14:31;  Status DC


Lidocaine HCl (Glydo (Lidocaine) Jelly) 1 thomas 1X  ONCE MM  Last administered on 

3/20/20at 16:38;  Start 3/20/20 at 14:30;  Stop 3/20/20 at 14:31;  Status DC


Linezolid/Dextrose 300 ml @  300 mls/hr Q12HR IV  Last administered on 3/26/20at

21:04;  Start 3/20/20 at 20:00;  Stop 3/27/20 at 07:50;  Status DC


Acetaminophen (Tylenol) 650 mg PRN Q6HRS  PRN PO MILD PAIN / TEMP;  Start 

3/21/20 at 03:30;  Stop 3/21/20 at 03:36;  Status DC


Acetaminophen (Tylenol) 650 mg PRN Q6HRS  PRN PEG MILD PAIN / TEMP Last 

administered on 4/16/20at 19:56;  Start 3/21/20 at 03:36


Sodium Chloride 1,000 ml @  1,000 mls/hr Q1H PRN IV hypotension;  Start 3/21/20 

at 07:50;  Stop 3/21/20 at 13:49;  Status DC


Albumin Human 200 ml @  200 mls/hr 1X PRN  PRN IV Hypotension;  Start 3/21/20 at

08:00;  Stop 3/21/20 at 13:59;  Status DC


Sodium Chloride (Normal Saline Flush) 10 ml 1X PRN  PRN IV AP catheter pack;  

Start 3/21/20 at 08:00;  Stop 3/22/20 at 07:59;  Status DC


Sodium Chloride (Normal Saline Flush) 10 ml 1X PRN  PRN IV  catheter pack;  

Start 3/21/20 at 08:00;  Stop 3/22/20 at 07:59;  Status DC


Sodium Chloride 1,000 ml @  400 mls/hr Q2H30M PRN IV PATENCY;  Start 3/21/20 at 

07:50;  Stop 3/21/20 at 19:49;  Status DC


Info (PHARMACY MONITORING -- do not chart) 1 each PRN DAILY  PRN MC SEE 

COMMENTS;  Start 3/21/20 at 08:00;  Status UNV


Info (PHARMACY MONITORING -- do not chart) 1 each PRN DAILY  PRN MC SEE 

COMMENTS;  Start 3/21/20 at 08:00;  Stop 3/23/20 at 08:25;  Status DC


Sodium Chloride 90 meq/Potassium Chloride 15 meq/ Potassium Phosphate 10 mmol/ 

Magnesium Sulfate 10 meq/Calcium Gluconate 20 meq/ Multivitamins 10 ml/Chromium/

Copper/Manganese/ Seleni/Zn 0.5 ml/ Total Parenteral Nutrition/Amino 

Acids/Dextrose/ Fat Emulsion Intravenous 1,512 ml @  63 mls/hr TPN  CONT IV  

Last administered on 3/21/20at 20:57;  Start 3/21/20 at 22:00;  Stop 3/22/20 at 

21:59;  Status DC


Sodium Chloride 90 meq/Potassium Chloride 15 meq/ Potassium Phosphate 15 mmol/ 

Magnesium Sulfate 10 meq/Calcium Gluconate 20 meq/ Multivitamins 10 ml/Chromium/

Copper/Manganese/ Seleni/Zn 0.5 ml/ Total Parenteral Nutrition/Amino Acid

s/Dextrose/ Fat Emulsion Intravenous 1,512 ml @  63 mls/hr TPN  CONT IV ;  Start

3/22/20 at 22:00;  Stop 3/22/20 at 14:16;  Status DC


Sodium Chloride 90 meq/Potassium Chloride 15 meq/ Potassium Phosphate 15 mmol/ 

Magnesium Sulfate 10 meq/Calcium Gluconate 20 meq/ Multivitamins 10 ml/Chromium/

Copper/Manganese/ Seleni/Zn 0.5 ml/ Total Parenteral Nutrition/Amino 

Acids/Dextrose/ Fat Emulsion Intravenous 1,200 ml @  50 mls/hr TPN  CONT IV ;  

Start 3/22/20 at 22:00;  Stop 3/22/20 at 14:17;  Status DC


Sodium Chloride 90 meq/Potassium Chloride 15 meq/ Potassium Phosphate 10 mmol/ 

Magnesium Sulfate 10 meq/Calcium Gluconate 20 meq/ Multivitamins 10 ml/Chromium/

Copper/Manganese/ Seleni/Zn 0.5 ml/ Total Parenteral Nutrition/Amino 

Acids/Dextrose/ Fat Emulsion Intravenous 1,200 ml @  50 mls/hr TPN  CONT IV  

Last administered on 3/22/20at 23:29;  Start 3/22/20 at 22:00;  Stop 3/23/20 at 

21:59;  Status DC


Sodium Chloride 1,000 ml @  1,000 mls/hr Q1H PRN IV hypotension;  Start 3/23/20 

at 07:28;  Stop 3/23/20 at 13:27;  Status DC


Albumin Human 200 ml @  200 mls/hr 1X  ONCE IV  Last administered on 3/23/20at 

08:51;  Start 3/23/20 at 07:30;  Stop 3/23/20 at 08:29;  Status DC


Diphenhydramine HCl (Benadryl) 25 mg 1X PRN  PRN IV ITCHING;  Start 3/23/20 at 

07:30;  Stop 3/24/20 at 07:29;  Status DC


Diphenhydramine HCl (Benadryl) 25 mg 1X PRN  PRN IV ITCHING;  Start 3/23/20 at 

07:30;  Stop 3/24/20 at 07:29;  Status DC


Sodium Chloride 1,000 ml @  400 mls/hr Q2H30M PRN IV PATENCY;  Start 3/23/20 at 

07:28;  Stop 3/23/20 at 19:27;  Status DC


Info (PHARMACY MONITORING -- do not chart) 1 each PRN DAILY  PRN MC SEE 

COMMENTS;  Start 3/23/20 at 07:30;  Stop 4/3/20 at 13:01;  Status DC


Metronidazole 100 ml @  100 mls/hr Q6HRS IV  Last administered on 4/8/20at 

06:26;  Start 3/23/20 at 08:30;  Stop 4/8/20 at 09:58;  Status DC


Micafungin Sodium 100 mg/Dextrose 100 ml @  100 mls/hr Q24H IV  Last 

administered on 4/27/20at 08:09;  Start 3/23/20 at 09:00


Propofol 0 ml @ As Directed STK-MED ONCE IV ;  Start 3/23/20 at 07:53;  Stop 

3/23/20 at 07:53;  Status DC


Etomidate (Amidate) 20 mg STK-MED ONCE IV ;  Start 3/23/20 at 07:53;  Stop 

3/23/20 at 07:54;  Status DC


Midazolam HCl (Versed) 5 mg STK-MED ONCE .ROUTE ;  Start 3/23/20 at 07:57;  Stop

3/23/20 at 07:57;  Status DC


Fentanyl Citrate 30 ml @ 0 mls/hr CONT  PRN IV SEE PROTOCOL Last administered on

4/17/20at 06:12;  Start 3/23/20 at 08:15;  Stop 4/17/20 at 09:19;  Status DC


Artificial Tears (Artificial Tears) 1 drop PRN Q1HR  PRN OU DRY EYE, 1st choice;

 Start 3/23/20 at 08:15


Midazolam HCl 50 mg/Sodium Chloride 50 ml @ 0 mls/hr CONT  PRN IV SEE PROTOCOL 

Last administered on 3/26/20at 22:39;  Start 3/23/20 at 08:15;  Stop 3/28/20 at 

15:59;  Status DC


Etomidate (Amidate) 8 mg 1X  ONCE IV  Last administered on 3/23/20at 08:33;  

Start 3/23/20 at 08:30;  Stop 3/23/20 at 08:31;  Status DC


Succinylcholine Chloride (Anectine) 120 mg 1X  ONCE IV  Last administered on 

3/23/20at 08:34;  Start 3/23/20 at 08:30;  Stop 3/23/20 at 08:31;  Status DC


Midazolam HCl (Versed) 5 mg 1X  ONCE IV ;  Start 3/23/20 at 08:30;  Stop 3/23/20

at 08:31;  Status DC


Potassium Chloride 15 meq/ Bicarbonate Dialysis Soln w/ out KCl 5,007.5 ml  @ 

1,000 mls/ hr Q5H1M IV  Last administered on 3/24/20at 11:11;  Start 3/23/20 at 

12:00;  Stop 3/24/20 at 11:15;  Status DC


Potassium Chloride 15 meq/ Bicarbonate Dialysis Soln w/ out KCl 5,007.5 ml  @ 

1,000 mls/ hr Q5H1M IV  Last administered on 3/24/20at 11:12;  Start 3/23/20 at 

12:00;  Stop 3/24/20 at 11:17;  Status DC


Potassium Chloride 15 meq/ Bicarbonate Dialysis Soln w/ out KCl 5,007.5 ml  @ 

1,000 mls/ hr Q5H1M IV  Last administered on 3/24/20at 11:11;  Start 3/23/20 at 

12:00;  Stop 3/24/20 at 11:19;  Status DC


Sodium Chloride 90 meq/Potassium Chloride 15 meq/ Potassium Phosphate 10 mmol/ 

Magnesium Sulfate 10 meq/Calcium Gluconate 20 meq/ Multivitamins 10 ml/Chromium/

Copper/Manganese/ Seleni/Zn 0.5 ml/ Total Parenteral Nutrition/Amino 

Acids/Dextrose/ Fat Emulsion Intravenous 1,400 ml @  58.333 mls/ hr TPN  CONT IV

 Last administered on 3/23/20at 21:42;  Start 3/23/20 at 22:00;  Stop 3/24/20 at

21:59;  Status DC


Heparin Sodium (Porcine) (Heparin Sodium) 5,000 unit Q8HRS SQ  Last administered

on 3/28/20at 05:55;  Start 3/23/20 at 15:00;  Stop 3/28/20 at 13:28;  Status DC


Meropenem 500 mg/ Sodium Chloride 50 ml @  100 mls/hr Q6HRS IV  Last 

administered on 3/25/20at 06:00;  Start 3/24/20 at 09:00;  Stop 3/25/20 at 

07:29;  Status DC


Potassium Phosphate 20 mmol/ Sodium Chloride 106.6667 ml @  51.667 m... 1X  ONCE

IV  Last administered on 3/24/20at 11:22;  Start 3/24/20 at 10:15;  Stop 3/24/20

at 12:18;  Status DC


Acetaminophen (Tylenol Supp) 650 mg PRN Q6HRS  PRN NM MILD PAIN / TEMP > 100.3'F

Last administered on 4/27/20at 00:32;  Start 3/24/20 at 10:30


Potassium Chloride/Water 100 ml @  100 mls/hr Q1H IV  Last administered on 

3/24/20at 12:12;  Start 3/24/20 at 11:00;  Stop 3/24/20 at 12:59;  Status DC


Potassium Chloride 20 meq/ Bicarbonate Dialysis Soln w/ out KCl 5,010 ml @  

1,000 mls/hr Q5H1M IV  Last administered on 3/25/20at 08:48;  Start 3/24/20 at 

12:00;  Stop 3/25/20 at 13:03;  Status DC


Potassium Chloride 20 meq/ Bicarbonate Dialysis Soln w/ out KCl 5,010 ml @  

1,000 mls/hr Q5H1M IV  Last administered on 3/29/20at 14:52;  Start 3/24/20 at 

11:30;  Stop 3/29/20 at 19:59;  Status DC


Potassium Chloride 20 meq/ Bicarbonate Dialysis Soln w/ out KCl 5,010 ml @  

1,000 mls/hr Q5H1M IV  Last administered on 3/29/20at 14:53;  Start 3/24/20 at 

11:30;  Stop 3/29/20 at 19:59;  Status DC


Sodium Chloride 90 meq/Potassium Chloride 15 meq/ Potassium Phosphate 15 mmol/ 

Magnesium Sulfate 10 meq/Calcium Gluconate 15 meq/ Multivitamins 10 ml/Chromium/

Copper/Manganese/ Seleni/Zn 0.5 ml/ Total Parenteral Nutrition/Amino 

Acids/Dextrose/ Fat Emulsion Intravenous 1,400 ml @  58.333 mls/ hr TPN  CONT IV

 Last administered on 3/24/20at 22:17;  Start 3/24/20 at 22:00;  Stop 3/25/20 at

21:59;  Status DC


Cefepime HCl (Maxipime) 2 gm Q12HR IVP  Last administered on 4/7/20at 20:56;  

Start 3/25/20 at 09:00;  Stop 4/8/20 at 09:58;  Status DC


Daptomycin 500 mg/ Sodium Chloride 50 ml @  100 mls/hr Q48H IV  Last 

administered on 4/10/20at 09:57;  Start 3/25/20 at 08:30;  Stop 4/10/20 at 

10:07;  Status DC


Lidocaine HCl (Buffered Lidocaine 1%) 3 ml 1X  ONCE INJ  Last administered on 

3/25/20at 10:27;  Start 3/25/20 at 10:30;  Stop 3/25/20 at 10:31;  Status DC


Potassium Phosphate 20 mmol/ Sodium Chloride 106.6667 ml @  51.667 m... 1X  ONCE

IV  Last administered on 3/25/20at 12:51;  Start 3/25/20 at 13:00;  Stop 3/25/20

at 15:03;  Status DC


Sodium Chloride 90 meq/Potassium Chloride 15 meq/ Potassium Phosphate 18 mmol/ 

Magnesium Sulfate 8 meq/Calcium Gluconate 15 meq/ Multivitamins 10 ml/Chromium/ 

Copper/Manganese/ Seleni/Zn 0.5 ml/ Total Parenteral Nutrition/Amino Acids/Dext

verenice/ Fat Emulsion Intravenous 1,400 ml @  58.333 mls/ hr TPN  CONT IV  Last 

administered on 3/25/20at 22:16;  Start 3/25/20 at 22:00;  Stop 3/26/20 at 

21:59;  Status DC


Potassium Chloride 20 meq/ Bicarbonate Dialysis Soln w/ out KCl 5,010 ml @  

1,000 mls/hr Q5H1M IV  Last administered on 3/29/20at 14:54;  Start 3/25/20 at 

16:00;  Stop 3/29/20 at 19:59;  Status DC


Multi-Ingred Cream/Lotion/Oil/ Oint (Artificial Tears Eye Ointment) 1 thomas PRN 

Q1HR  PRN OU DRY EYE, 2nd choice Last administered on 4/13/20at 08:19;  Start 

3/25/20 at 17:30


Sodium Chloride 90 meq/Potassium Chloride 15 meq/ Potassium Phosphate 18 mmol/ 

Magnesium Sulfate 8 meq/Calcium Gluconate 15 meq/ Multivitamins 10 ml/Chromium/ 

Copper/Manganese/ Seleni/Zn 0.5 ml/ Total Parenteral Nutrition/Amino 

Acids/Dextrose/ Fat Emulsion Intravenous 1,400 ml @  58.333 mls/ hr TPN  CONT IV

 Last administered on 3/26/20at 22:00;  Start 3/26/20 at 22:00;  Stop 3/27/20 at

21:59;  Status DC


Albumin Human 500 ml @  125 mls/hr 1X  ONCE IV ;  Start 3/26/20 at 14:15;  Stop 

3/26/20 at 18:14;  Status DC


Sodium Chloride 90 meq/Potassium Chloride 15 meq/ Potassium Phosphate 18 mmol/ 

Magnesium Sulfate 8 meq/Calcium Gluconate 15 meq/ Multivitamins 10 ml/Chromium/ 

Copper/Manganese/ Seleni/Zn 0.5 ml/ Insulin Human Regular 10 unit/ Total 

Parenteral Nutrition/Amino Acids/Dextrose/ Fat Emulsion Intravenous 1,400 ml @  

58.333 mls/ hr TPN  CONT IV  Last administered on 3/27/20at 21:43;  Start 

3/27/20 at 22:00;  Stop 3/28/20 at 21:59;  Status DC


Lidocaine HCl (Buffered Lidocaine 1%) 3 ml STK-MED ONCE .ROUTE ;  Start 3/25/20 

at 10:00;  Stop 3/27/20 at 13:57;  Status DC


Midazolam HCl 100 mg/Sodium Chloride 100 ml @ 7 mls/hr CONT  PRN IV SEE PROTOCOL

Last administered on 4/8/20at 15:35;  Start 3/28/20 at 16:00


Sodium Chloride 90 meq/Potassium Chloride 15 meq/ Potassium Phosphate 18 mmol/ 

Magnesium Sulfate 8 meq/Calcium Gluconate 15 meq/ Multivitamins 10 ml/Chromium/ 

Copper/Manganese/ Seleni/Zn 0.5 ml/ Insulin Human Regular 15 unit/ Total 

Parenteral Nutrition/Amino Acids/Dextrose/ Fat Emulsion Intravenous 1,400 ml @  

58.333 mls/ hr TPN  CONT IV  Last administered on 3/28/20at 20:34;  Start 

3/28/20 at 22:00;  Stop 3/29/20 at 21:59;  Status DC


Info ([facility] Electrolyte Protocol) 1 ea CONT PRN  PRN MC PER PROTOCOL;  Start 

3/29/20 at 13:15


Sodium Chloride 90 meq/Potassium Chloride 15 meq/ Potassium Phosphate 18 mmol/ 

Magnesium Sulfate 8 meq/Calcium Gluconate 15 meq/ Multivitamins 10 ml/Chromium/ 

Copper/Manganese/ Seleni/Zn 0.5 ml/ Insulin Human Regular 15 unit/ Total 

Parenteral Nutrition/Amino Acids/Dextrose/ Fat Emulsion Intravenous 1,400 ml @  

58.333 mls/ hr TPN  CONT IV  Last administered on 3/29/20at 22:05;  Start 

3/29/20 at 22:00;  Stop 3/30/20 at 21:59;  Status DC


Potassium Chloride 15 meq/ Bicarbonate Dialysis Soln w/ out KCl 5,007.5 ml  @ 

1,000 mls/ hr Q5H1M IV  Last administered on 4/1/20at 18:14;  Start 3/29/20 at 

20:00;  Stop 4/2/20 at 13:08;  Status DC


Potassium Chloride 15 meq/ Bicarbonate Dialysis Soln w/ out KCl 5,007.5 ml  @ 

1,000 mls/ hr Q5H1M IV  Last administered on 4/1/20at 18:14;  Start 3/29/20 at 

20:00;  Stop 4/2/20 at 13:08;  Status DC


Potassium Chloride 15 meq/ Bicarbonate Dialysis Soln w/ out KCl 5,007.5 ml  @ 

1,000 mls/ hr Q5H1M IV  Last administered on 4/1/20at 18:14;  Start 3/29/20 at 

20:00;  Stop 4/2/20 at 13:08;  Status DC


Iohexol (Omnipaque 240 Mg/ml) 30 ml 1X  ONCE PO  Last administered on 3/30/20at 

11:30;  Start 3/30/20 at 11:30;  Stop 3/30/20 at 11:33;  Status DC


Info (CONTRAST GIVEN -- Rx MONITORING) 1 each PRN DAILY  PRN MC SEE COMMENTS;  

Start 3/30/20 at 11:45;  Stop 4/1/20 at 11:44;  Status DC


Sodium Chloride 90 meq/Potassium Chloride 15 meq/ Potassium Phosphate 18 mmol/ 

Magnesium Sulfate 8 meq/Calcium Gluconate 15 meq/ Multivitamins 10 ml/Chromium/ 

Copper/Manganese/ Seleni/Zn 0.5 ml/ Insulin Human Regular 15 unit/ Total 

Parenteral Nutrition/Amino Acids/Dextrose/ Fat Emulsion Intravenous 1,400 ml @  

58.333 mls/ hr TPN  CONT IV  Last administered on 3/30/20at 21:47;  Start 

3/30/20 at 22:00;  Stop 3/31/20 at 21:59;  Status DC


Sodium Chloride 90 meq/Potassium Chloride 15 meq/ Potassium Phosphate 18 mmol/ 

Magnesium Sulfate 8 meq/Calcium Gluconate 15 meq/ Multivitamins 10 ml/Chromium/ 

Copper/Manganese/ Seleni/Zn 0.5 ml/ Insulin Human Regular 20 unit/ Total 

Parenteral Nutrition/Amino Acids/Dextrose/ Fat Emulsion Intravenous 1,400 ml @  

58.333 mls/ hr TPN  CONT IV  Last administered on 3/31/20at 21:36;  Start 

3/31/20 at 22:00;  Stop 4/1/20 at 21:59;  Status DC


Alteplase, Recombinant (Cathflo For Central Catheter Clearance) 1 mg 1X  ONCE 

INT CAT  Last administered on 3/31/20at 20:03;  Start 3/31/20 at 19:30;  Stop 

3/31/20 at 19:46;  Status DC


Alteplase, Recombinant (Cathflo For Central Catheter Clearance) 1 mg 1X  ONCE 

INT CAT  Last administered on 3/31/20at 22:05;  Start 3/31/20 at 22:00;  Stop 

3/31/20 at 22:01;  Status DC


Sodium Chloride 90 meq/Potassium Chloride 15 meq/ Potassium Phosphate 18 mmol/ 

Magnesium Sulfate 8 meq/Calcium Gluconate 15 meq/ Multivitamins 10 ml/Chromium/ 

Copper/Manganese/ Seleni/Zn 0.5 ml/ Insulin Human Regular 20 unit/ Total 

Parenteral Nutrition/Amino Acids/Dextrose/ Fat Emulsion Intravenous 1,400 ml @  

58.333 mls/ hr TPN  CONT IV  Last administered on 4/1/20at 21:30;  Start 4/1/20 

at 22:00;  Stop 4/2/20 at 21:59;  Status DC


Dexmedetomidine HCl 400 mcg/ Sodium Chloride 100 ml @ 0 mls/hr CONT  PRN IV 

ANXIETY / AGITATION Last administered on 4/27/20at 08:19;  Start 4/2/20 at 08:15


Sodium Chloride 500 ml @  500 mls/hr 1X PRN  PRN IV ELEVATED BP, SEE COMMENTS;  

Start 4/2/20 at 08:15


Atropine Sulfate (ATROPINE 0.5mg SYRINGE) 0.5 mg PRN Q5MIN  PRN IV SEE COMMENTS;

 Start 4/2/20 at 08:15


Furosemide (Lasix) 20 mg 1X  ONCE IVP  Last administered on 4/2/20at 08:19;  

Start 4/2/20 at 08:15;  Stop 4/2/20 at 08:16;  Status DC


Lidocaine HCl (Buffered Lidocaine 1%) 3 ml STK-MED ONCE .ROUTE ;  Start 4/2/20 

at 08:39;  Stop 4/2/20 at 08:39;  Status DC


Lidocaine HCl (Buffered Lidocaine 1%) 6 ml 1X  ONCE INJ  Last administered on 

4/2/20at 09:05;  Start 4/2/20 at 09:00;  Stop 4/2/20 at 09:06;  Status DC


Sodium Chloride 90 meq/Potassium Chloride 15 meq/ Potassium Phosphate 18 mmol/ 

Magnesium Sulfate 8 meq/Calcium Gluconate 15 meq/ Multivitamins 10 ml/Chromium/ 

Copper/Manganese/ Seleni/Zn 0.5 ml/ Insulin Human Regular 20 unit/ Total 

Parenteral Nutrition/Amino Acids/Dextrose/ Fat Emulsion Intravenous 1,400 ml @  

58.333 mls/ hr TPN  CONT IV  Last administered on 4/2/20at 22:45;  Start 4/2/20 

at 22:00;  Stop 4/3/20 at 21:59;  Status DC


Sodium Chloride 1,000 ml @  1,000 mls/hr Q1H PRN IV hypotension;  Start 4/3/20 

at 07:30;  Stop 4/3/20 at 13:29;  Status DC


Albumin Human 200 ml @  200 mls/hr 1X PRN  PRN IV Hypotension Last administered 

on 4/3/20at 09:36;  Start 4/3/20 at 07:30;  Stop 4/3/20 at 13:29;  Status DC


Sodium Chloride (Normal Saline Flush) 10 ml 1X PRN  PRN IV AP catheter pack;  

Start 4/3/20 at 07:30;  Stop 4/3/20 at 21:29;  Status DC


Sodium Chloride (Normal Saline Flush) 10 ml 1X PRN  PRN IV  catheter pack;  

Start 4/3/20 at 07:30;  Stop 4/4/20 at 07:29;  Status DC


Sodium Chloride 1,000 ml @  400 mls/hr Q2H30M PRN IV PATENCY;  Start 4/3/20 at 

07:30;  Stop 4/3/20 at 19:29;  Status DC


Info (PHARMACY MONITORING -- do not chart) 1 each PRN DAILY  PRN MC SEE 

COMMENTS;  Start 4/3/20 at 07:30;  Stop 4/3/20 at 13:02;  Status DC


Info (PHARMACY MONITORING -- do not chart) 1 each PRN DAILY  PRN MC SEE 

COMMENTS;  Start 4/3/20 at 07:30;  Stop 4/5/20 at 12:45;  Status DC


Sodium Chloride 90 meq/Potassium Chloride 15 meq/ Potassium Phosphate 10 mmol/ 

Magnesium Sulfate 8 meq/Calcium Gluconate 15 meq/ Multivitamins 10 ml/Chromium/ 

Copper/Manganese/ Seleni/Zn 0.5 ml/ Insulin Human Regular 25 unit/ Total 

Parenteral Nutrition/Amino Acids/Dextrose/ Fat Emulsion Intravenous 1,400 ml @  

58.333 mls/ hr TPN  CONT IV  Last administered on 4/3/20at 22:19;  Start 4/3/20 

at 22:00;  Stop 4/4/20 at 21:59;  Status DC


Heparin Sodium (Porcine) (Heparin Sodium) 5,000 unit Q12HR SQ  Last administered

on 4/26/20at 08:59;  Start 4/3/20 at 21:00;  Stop 4/26/20 at 10:05;  Status DC


Ondansetron HCl (Zofran) 4 mg PRN Q6HRS  PRN IV NAUSEA/VOMITING;  Start 4/6/20 

at 07:00;  Stop 4/7/20 at 06:59;  Status DC


Fentanyl Citrate (Fentanyl 2ml Vial) 25 mcg PRN Q5MIN  PRN IV MILD PAIN 1-3;  

Start 4/6/20 at 07:00;  Stop 4/7/20 at 06:59;  Status DC


Fentanyl Citrate (Fentanyl 2ml Vial) 50 mcg PRN Q5MIN  PRN IV MODERATE TO SEVERE

PAIN;  Start 4/6/20 at 07:00;  Stop 4/7/20 at 06:59;  Status DC


Ringer's Solution 1,000 ml @  30 mls/hr Q24H IV ;  Start 4/6/20 at 07:00;  Stop 

4/6/20 at 18:59;  Status DC


Lidocaine HCl (Xylocaine-Mpf 1% 2ml Vial) 2 ml PRN 1X  PRN ID PRIOR TO IV START;

 Start 4/6/20 at 07:00;  Stop 4/7/20 at 06:59;  Status DC


Prochlorperazine Edisylate (Compazine) 5 mg PACU PRN  PRN IV NAUSEA, MRX1;  

Start 4/6/20 at 07:00;  Stop 4/7/20 at 06:59;  Status DC


Sodium Chloride 1,000 ml @  1,000 mls/hr Q1H PRN IV hypotension;  Start 4/4/20 

at 09:10;  Stop 4/4/20 at 15:09;  Status DC


Albumin Human 200 ml @  200 mls/hr 1X PRN  PRN IV Hypotension Last administered 

on 4/4/20at 10:10;  Start 4/4/20 at 09:15;  Stop 4/4/20 at 15:14;  Status DC


Sodium Chloride 1,000 ml @  400 mls/hr Q2H30M PRN IV PATENCY;  Start 4/4/20 at 

09:10;  Stop 4/4/20 at 21:09;  Status DC


Info (PHARMACY MONITORING -- do not chart) 1 each PRN DAILY  PRN MC SEE 

COMMENTS;  Start 4/4/20 at 09:15;  Stop 4/5/20 at 12:45;  Status DC


Info (PHARMACY MONITORING -- do not chart) 1 each PRN DAILY  PRN MC SEE 

COMMENTS;  Start 4/4/20 at 09:15;  Stop 4/5/20 at 12:45;  Status DC


Sodium Chloride 90 meq/Potassium Chloride 15 meq/ Potassium Phosphate 10 mmol/ 

Magnesium Sulfate 8 meq/Calcium Gluconate 15 meq/ Multivitamins 10 ml/Chromium/ 

Copper/Manganese/ Seleni/Zn 0.5 ml/ Insulin Human Regular 25 unit/ Total 

Parenteral Nutrition/Amino Acids/Dextrose/ Fat Emulsion Intravenous 1,400 ml @  

58.333 mls/ hr TPN  CONT IV  Last administered on 4/4/20at 22:10;  Start 4/4/20 

at 22:00;  Stop 4/5/20 at 21:59;  Status DC


Magnesium Sulfate 50 ml @ 25 mls/hr PRN DAILY  PRN IV for Mag < 1.7 on am labs 

Last administered on 4/20/20at 17:27;  Start 4/5/20 at 09:15


Sodium Chloride 90 meq/Potassium Chloride 15 meq/ Potassium Phosphate 10 mmol/ 

Magnesium Sulfate 8 meq/Calcium Gluconate 15 meq/ Multivitamins 10 ml/Chromium/ 

Copper/Manganese/ Seleni/Zn 0.5 ml/ Insulin Human Regular 25 unit/ Total 

Parenteral Nutrition/Amino Acids/Dextrose/ Fat Emulsion Intravenous 1,400 ml @  

58.333 mls/ hr TPN  CONT IV  Last administered on 4/5/20at 21:20;  Start 4/5/20 

at 22:00;  Stop 4/6/20 at 21:59;  Status DC


Sodium Chloride 1,000 ml @  1,000 mls/hr Q1H PRN IV hypotension;  Start 4/5/20 

at 12:23;  Stop 4/5/20 at 18:22;  Status DC


Albumin Human 200 ml @  200 mls/hr 1X  ONCE IV  Last administered on 4/5/20at 

13:34;  Start 4/5/20 at 12:30;  Stop 4/5/20 at 13:29;  Status DC


Diphenhydramine HCl (Benadryl) 25 mg 1X PRN  PRN IV ITCHING;  Start 4/5/20 at 

12:30;  Stop 4/6/20 at 12:29;  Status DC


Diphenhydramine HCl (Benadryl) 25 mg 1X PRN  PRN IV ITCHING;  Start 4/5/20 at 

12:30;  Stop 4/6/20 at 12:29;  Status DC


Info (PHARMACY MONITORING -- do not chart) 1 each PRN DAILY  PRN MC SEE 

COMMENTS;  Start 4/5/20 at 12:30;  Status Cancel


Bupivacaine HCl/ Epinephrine Bitart (Sensorcain-Epi 0.5%-1:803453 Mpf) 30 ml 

STK-MED ONCE .ROUTE  Last administered on 4/6/20at 11:44;  Start 4/6/20 at 

11:00;  Stop 4/6/20 at 11:01;  Status DC


Cellulose (Surgicel Fibrillar 1x2) 1 each STK-MED ONCE .ROUTE ;  Start 4/6/20 at

11:00;  Stop 4/6/20 at 11:01;  Status DC


Sodium Chloride 90 meq/Potassium Chloride 15 meq/ Potassium Phosphate 10 mmol/ 

Magnesium Sulfate 12 meq/Calcium Gluconate 15 meq/ Multivitamins 10 ml/Chromium/

Copper/Manganese/ Seleni/Zn 0.5 ml/ Insulin Human Regular 25 unit/ Total 

Parenteral Nutrition/Amino Acids/Dextrose/ Fat Emulsion Intravenous 1,400 ml @  

58.333 mls/ hr TPN  CONT IV  Last administered on 4/6/20at 22:24;  Start 4/6/20 

at 22:00;  Stop 4/7/20 at 21:59;  Status DC


Propofol 20 ml @ As Directed STK-MED ONCE IV ;  Start 4/6/20 at 11:07;  Stop 

4/6/20 at 11:07;  Status DC


Cellulose (Surgicel Hemostat 4x8) 1 each STK-MED ONCE .ROUTE  Last administered 

on 4/6/20at 11:44;  Start 4/6/20 at 11:55;  Stop 4/6/20 at 11:56;  Status DC


Sevoflurane (Ultane) 60 ml STK-MED ONCE IH ;  Start 4/6/20 at 12:46;  Stop 

4/6/20 at 12:46;  Status DC


Sodium Chloride 1,000 ml @  1,000 mls/hr Q1H PRN IV hypotension;  Start 4/6/20 

at 13:51;  Stop 4/6/20 at 19:50;  Status DC


Albumin Human 200 ml @  200 mls/hr 1X PRN  PRN IV Hypotension Last administered 

on 4/6/20at 14:51;  Start 4/6/20 at 14:00;  Stop 4/6/20 at 19:59;  Status DC


Diphenhydramine HCl (Benadryl) 25 mg 1X PRN  PRN IV ITCHING;  Start 4/6/20 at 

14:00;  Stop 4/7/20 at 13:59;  Status DC


Diphenhydramine HCl (Benadryl) 25 mg 1X PRN  PRN IV ITCHING;  Start 4/6/20 at 

14:00;  Stop 4/7/20 at 13:59;  Status DC


Sodium Chloride 1,000 ml @  400 mls/hr Q2H30M PRN IV PATENCY;  Start 4/6/20 at 

13:51;  Stop 4/7/20 at 01:50;  Status DC


Info (PHARMACY MONITORING -- do not chart) 1 each PRN DAILY  PRN MC SEE 

COMMENTS;  Start 4/6/20 at 14:00;  Stop 4/9/20 at 08:16;  Status DC


Heparin Sodium (Porcine) (Hep Lock Adult) 500 unit STK-MED ONCE IVP ;  Start 

4/7/20 at 09:29;  Stop 4/7/20 at 09:30;  Status DC


Sodium Chloride 1,000 ml @  1,000 mls/hr Q1H PRN IV hypotension;  Start 4/7/20 

at 10:43;  Stop 4/7/20 at 16:42;  Status DC


Sodium Chloride 1,000 ml @  400 mls/hr Q2H30M PRN IV PATENCY;  Start 4/7/20 at 

10:43;  Stop 4/7/20 at 22:42;  Status DC


Info (PHARMACY MONITORING -- do not chart) 1 each PRN DAILY  PRN MC SEE 

COMMENTS;  Start 4/7/20 at 10:45;  Status UNV


Info (PHARMACY MONITORING -- do not chart) 1 each PRN DAILY  PRN MC SEE 

COMMENTS;  Start 4/7/20 at 10:45;  Status UNV


Sodium Chloride 90 meq/Potassium Chloride 15 meq/ Magnesium Sulfate 12 

meq/Calcium Gluconate 15 meq/ Multivitamins 10 ml/Chromium/ Copper/Manganese/ 

Seleni/Zn 0.5 ml/ Insulin Human Regular 25 unit/ Total Parenteral 

Nutrition/Amino Acids/Dextrose/ Fat Emulsion Intravenous 1,400 ml @  58.333 mls/

hr TPN  CONT IV  Last administered on 4/7/20at 22:13;  Start 4/7/20 at 22:00;  

Stop 4/8/20 at 21:59;  Status DC


Sodium Chloride 1,000 ml @  1,000 mls/hr Q1H PRN IV hypotension;  Start 4/8/20 

at 07:50;  Stop 4/8/20 at 13:49;  Status DC


Albumin Human 200 ml @  200 mls/hr 1X  ONCE IV ;  Start 4/8/20 at 08:00;  Stop 

4/8/20 at 08:53;  Status DC


Diphenhydramine HCl (Benadryl) 25 mg 1X PRN  PRN IV ITCHING;  Start 4/8/20 at 

08:00;  Stop 4/9/20 at 07:59;  Status DC


Diphenhydramine HCl (Benadryl) 25 mg 1X PRN  PRN IV ITCHING;  Start 4/8/20 at 

08:00;  Stop 4/9/20 at 07:59;  Status DC


Info (PHARMACY MONITORING -- do not chart) 1 each PRN DAILY  PRN MC SEE 

COMMENTS;  Start 4/8/20 at 08:00;  Stop 4/9/20 at 08:16;  Status DC


Albumin Human 50 ml @ 50 mls/hr 1X  ONCE IV ;  Start 4/8/20 at 08:53;  Stop 

4/8/20 at 08:56;  Status DC


Albumin Human 200 ml @  50 mls/hr PRN 1X  PRN IV HYPOTENSION Last administered 

on 4/14/20at 11:54;  Start 4/8/20 at 09:00


Meropenem 500 mg/ Sodium Chloride 50 ml @  100 mls/hr Q12H IV  Last administered

on 4/26/20at 22:11;  Start 4/8/20 at 10:00


Sodium Chloride 90 meq/Magnesium Sulfate 12 meq/ Calcium Gluconate 15 meq/ 

Multivitamins 10 ml/Chromium/ Copper/Manganese/ Seleni/Zn 0.5 ml/ Insulin Human 

Regular 25 unit/ Total Parenteral Nutrition/Amino Acids/Dextrose/ Fat Emulsion 

Intravenous 1,400 ml @  58.333 mls/ hr TPN  CONT IV  Last administered on 

4/8/20at 21:41;  Start 4/8/20 at 22:00;  Stop 4/9/20 at 21:59;  Status DC


Sodium Chloride 1,000 ml @  1,000 mls/hr Q1H PRN IV hypotension;  Start 4/9/20 

at 07:58;  Stop 4/9/20 at 13:57;  Status DC


Albumin Human 200 ml @  200 mls/hr 1X PRN  PRN IV Hypotension Last administered 

on 4/9/20at 09:30;  Start 4/9/20 at 08:00;  Stop 4/9/20 at 13:59;  Status DC


Sodium Chloride 1,000 ml @  400 mls/hr Q2H30M PRN IV PATENCY;  Start 4/9/20 at 

07:58;  Stop 4/9/20 at 19:57;  Status DC


Info (PHARMACY MONITORING -- do not chart) 1 each PRN DAILY  PRN MC SEE CO

MMENTS;  Start 4/9/20 at 08:00;  Status Cancel


Info (PHARMACY MONITORING -- do not chart) 1 each PRN DAILY  PRN MC SEE 

COMMENTS;  Start 4/9/20 at 08:15;  Status UNV


Sodium Chloride 90 meq/Potassium Phosphate 5 mmol/ Magnesium Sulfate 12 

meq/Calcium Gluconate 15 meq/ Multivitamins 10 ml/Chromium/ Copper/Manganese/ 

Seleni/Zn 0.5 ml/ Insulin Human Regular 30 unit/ Total Parenteral Nut

rition/Amino Acids/Dextrose/ Fat Emulsion Intravenous 1,400 ml @  58.333 mls/ hr

TPN  CONT IV  Last administered on 4/9/20at 22:08;  Start 4/9/20 at 22:00;  Stop

4/10/20 at 21:59;  Status DC


Linezolid/Dextrose 300 ml @  300 mls/hr Q12HR IV  Last administered on 4/20/20at

20:40;  Start 4/10/20 at 11:00;  Stop 4/21/20 at 08:10;  Status DC


Sodium Chloride 90 meq/Potassium Phosphate 15 mmol/ Magnesium Sulfate 12 

meq/Calcium Gluconate 15 meq/ Multivitamins 10 ml/Chromium/ Copper/Manganese/ 

Seleni/Zn 0.5 ml/ Insulin Human Regular 30 unit/ Total Parenteral 

Nutrition/Amino Acids/Dextrose/ Fat Emulsion Intravenous 1,400 ml @  58.333 mls/

hr TPN  CONT IV  Last administered on 4/10/20at 21:49;  Start 4/10/20 at 22:00; 

Stop 4/11/20 at 21:59;  Status DC


Sodium Chloride 90 meq/Potassium Phosphate 15 mmol/ Magnesium Sulfate 12 

meq/Calcium Gluconate 15 meq/ Multivitamins 10 ml/Chromium/ Copper/Manganese/ 

Seleni/Zn 0.5 ml/ Insulin Human Regular 40 unit/ Total Parenteral 

Nutrition/Amino Acids/Dextrose/ Fat Emulsion Intravenous 1,400 ml @  58.333 mls/

hr TPN  CONT IV  Last administered on 4/11/20at 21:21;  Start 4/11/20 at 22:00; 

Stop 4/12/20 at 21:59;  Status DC


Sodium Chloride 1,000 ml @  1,000 mls/hr Q1H PRN IV hypotension;  Start 4/11/20 

at 13:26;  Stop 4/11/20 at 19:25;  Status DC


Albumin Human 200 ml @  200 mls/hr 1X PRN  PRN IV Hypotension Last administered 

on 4/11/20at 15:00;  Start 4/11/20 at 13:30;  Stop 4/11/20 at 19:29;  Status DC


Sodium Chloride (Normal Saline Flush) 10 ml 1X PRN  PRN IV AP catheter pack;  

Start 4/11/20 at 13:30;  Stop 4/12/20 at 13:29;  Status DC


Sodium Chloride (Normal Saline Flush) 10 ml 1X PRN  PRN IV  catheter pack;  

Start 4/11/20 at 13:30;  Stop 4/12/20 at 13:29;  Status DC


Sodium Chloride 1,000 ml @  400 mls/hr Q2H30M PRN IV PATENCY;  Start 4/11/20 at 

13:26;  Stop 4/12/20 at 01:25;  Status DC


Info (PHARMACY MONITORING -- do not chart) 1 each PRN DAILY  PRN MC SEE CO

MMENTS;  Start 4/11/20 at 13:30;  Stop 4/11/20 at 13:33;  Status DC


Info (PHARMACY MONITORING -- do not chart) 1 each PRN DAILY  PRN MC SEE 

COMMENTS;  Start 4/11/20 at 13:30;  Stop 4/11/20 at 13:34;  Status DC


Sodium Chloride 90 meq/Potassium Phosphate 19 mmol/ Magnesium Sulfate 12 

meq/Calcium Gluconate 15 meq/ Multivitamins 10 ml/Chromium/ Copper/Manganese/ 

Seleni/Zn 0.5 ml/ Insulin Human Regular 40 unit/ Total Parenteral 

Nutrition/Amino Acids/Dextrose/ Fat Emulsion Intravenous 1,400 ml @  58.333 mls/

hr TPN  CONT IV  Last administered on 4/12/20at 21:54;  Start 4/12/20 at 22:00; 

Stop 4/13/20 at 21:59;  Status DC


Sodium Chloride 1,000 ml @  1,000 mls/hr Q1H PRN IV hypotension;  Start 4/13/20 

at 09:35;  Stop 4/13/20 at 15:34;  Status DC


Albumin Human 200 ml @  200 mls/hr 1X PRN  PRN IV Hypotension;  Start 4/13/20 at

09:45;  Stop 4/13/20 at 15:44;  Status DC


Diphenhydramine HCl (Benadryl) 25 mg 1X PRN  PRN IV ITCHING;  Start 4/13/20 at 

09:45;  Stop 4/14/20 at 09:44;  Status DC


Diphenhydramine HCl (Benadryl) 25 mg 1X PRN  PRN IV ITCHING;  Start 4/13/20 at 

09:45;  Stop 4/14/20 at 09:44;  Status DC


Sodium Chloride 1,000 ml @  400 mls/hr Q2H30M PRN IV PATENCY;  Start 4/13/20 at 

09:35;  Stop 4/13/20 at 21:34;  Status DC


Info (PHARMACY MONITORING -- do not chart) 1 each PRN DAILY  PRN MC SEE 

COMMENTS;  Start 4/13/20 at 09:45;  Status Cancel


Sodium Chloride 100 meq/Potassium Phosphate 19 mmol/ Magnesium Sulfate 12 

meq/Calcium Gluconate 15 meq/ Multivitamins 10 ml/Chromium/ Copper/Manganese/ 

Seleni/Zn 0.5 ml/ Insulin Human Regular 40 unit/ Potassium Chloride 20 meq/ 

Total Parenteral Nutrition/Amino Acids/Dextrose/ Fat Emulsion Intravenous 1,400 

ml @  58.333 mls/ hr TPN  CONT IV  Last administered on 4/13/20at 22:02;  Start 

4/13/20 at 22:00;  Stop 4/14/20 at 21:59;  Status DC


Furosemide (Lasix) 40 mg 1X  ONCE IVP  Last administered on 4/13/20at 14:39;  

Start 4/13/20 at 14:30;  Stop 4/13/20 at 14:31;  Status DC


Metronidazole 100 ml @  100 mls/hr Q8HRS IV  Last administered on 4/21/20at 

06:04;  Start 4/14/20 at 10:00;  Stop 4/21/20 at 08:10;  Status DC


Sodium Chloride 1,000 ml @  1,000 mls/hr Q1H PRN IV hypotension;  Start 4/14/20 

at 08:00;  Stop 4/14/20 at 13:59;  Status DC


Albumin Human 200 ml @  200 mls/hr 1X PRN  PRN IV Hypotension;  Start 4/14/20 at

08:00;  Stop 4/14/20 at 13:59;  Status DC


Sodium Chloride 1,000 ml @  400 mls/hr Q2H30M PRN IV PATENCY;  Start 4/14/20 at 

08:00;  Stop 4/14/20 at 19:59;  Status DC


Info (PHARMACY MONITORING -- do not chart) 1 each PRN DAILY  PRN MC SEE 

COMMENTS;  Start 4/14/20 at 11:30;  Status UNV


Info (PHARMACY MONITORING -- do not chart) 1 each PRN DAILY  PRN MC SEE 

COMMENTS;  Start 4/14/20 at 11:30;  Stop 4/16/20 at 12:13;  Status DC


Sodium Chloride 100 meq/Potassium Phosphate 19 mmol/ Magnesium Sulfate 12 

meq/Calcium Gluconate 15 meq/ Multivitamins 10 ml/Chromium/ Copper/Manganese/ 

Seleni/Zn 0.5 ml/ Insulin Human Regular 40 unit/ Potassium Chloride 20 meq/ 

Total Parenteral Nutrition/Amino Acids/Dextrose/ Fat Emulsion Intravenous 1,400 

ml @  58.333 mls/ hr TPN  CONT IV  Last administered on 4/14/20at 21:52;  Start 

4/14/20 at 22:00;  Stop 4/15/20 at 21:59;  Status DC


Sodium Chloride (Normal Saline Flush) 10 ml QSHIFT  PRN IV AFTER MEDS AND BLOOD 

DRAWS;  Start 4/14/20 at 15:00


Sodium Chloride (Normal Saline Flush) 10 ml PRN Q5MIN  PRN IV AFTER MEDS AND 

BLOOD DRAWS;  Start 4/14/20 at 15:00


Sodium Chloride (Normal Saline Flush) 20 ml PRN Q5MIN  PRN IV AFTER MEDS AND 

BLOOD DRAWS;  Start 4/14/20 at 15:00


Sodium Chloride 100 meq/Potassium Phosphate 19 mmol/ Magnesium Sulfate 12 

meq/Calcium Gluconate 15 meq/ Multivitamins 10 ml/Chromium/ Copper/Manganese/ 

Seleni/Zn 0.5 ml/ Insulin Human Regular 40 unit/ Potassium Chloride 20 meq/ 

Total Parenteral Nutrition/Amino Acids/Dextrose/ Fat Emulsion Intravenous 1,400 

ml @  58.333 mls/ hr TPN  CONT IV  Last administered on 4/15/20at 21:20;  Start 

4/15/20 at 22:00;  Stop 4/16/20 at 21:59;  Status DC


Lidocaine HCl (Buffered Lidocaine 1%) 3 ml STK-MED ONCE .ROUTE ;  Start 4/15/20 

at 13:16;  Stop 4/15/20 at 13:16;  Status DC


Lidocaine HCl (Buffered Lidocaine 1%) 6 ml 1X  ONCE INJ  Last administered on 

4/15/20at 13:45;  Start 4/15/20 at 13:30;  Stop 4/15/20 at 13:31;  Status DC


Albumin Human 100 ml @  100 mls/hr 1X  ONCE IV  Last administered on 4/15/20at 

15:41;  Start 4/15/20 at 15:00;  Stop 4/15/20 at 15:59;  Status DC


Albumin Human 50 ml @ 50 mls/hr 1X  ONCE IV  Last administered on 4/15/20at 

15:00;  Start 4/15/20 at 15:00;  Stop 4/15/20 at 15:59;  Status DC


Info (PHARMACY MONITORING -- do not chart) 1 each PRN DAILY  PRN MC SEE 

COMMENTS;  Start 4/16/20 at 11:30;  Status Cancel


Info (PHARMACY MONITORING -- do not chart) 1 each PRN DAILY  PRN MC SEE 

COMMENTS;  Start 4/16/20 at 11:30;  Status UNV


Sodium Chloride 100 meq/Potassium Phosphate 10 mmol/ Magnesium Sulfate 12 

meq/Calcium Gluconate 15 meq/ Multivitamins 10 ml/Chromium/ Copper/Manganese/ 

Seleni/Zn 0.5 ml/ Insulin Human Regular 35 unit/ Potassium Chloride 20 meq/ 

Total Parenteral Nutrition/Amino Acids/Dextrose/ Fat Emulsion Intravenous 1,400 

ml @  58.333 mls/ hr TPN  CONT IV  Last administered on 4/16/20at 22:10;  Start 

4/16/20 at 22:00;  Stop 4/17/20 at 21:59;  Status DC


Sodium Chloride 100 meq/Potassium Phosphate 5 mmol/ Magnesium Sulfate 12 

meq/Calcium Gluconate 15 meq/ Multivitamins 10 ml/Chromium/ Copper/Manganese/ 

Seleni/Zn 0.5 ml/ Insulin Human Regular 35 unit/ Potassium Chloride 20 meq/ 

Total Parenteral Nutrition/Amino Acids/Dextrose/ Fat Emulsion Intravenous 1,400 

ml @  58.333 mls/ hr TPN  CONT IV  Last administered on 4/17/20at 22:59;  Start 

4/17/20 at 22:00;  Stop 4/18/20 at 21:59;  Status DC


Sodium Chloride 1,000 ml @  1,000 mls/hr Q1H PRN IV hypotension;  Start 4/18/20 

at 08:27;  Stop 4/18/20 at 14:26;  Status DC


Albumin Human 200 ml @  200 mls/hr 1X PRN  PRN IV Hypotension Last administered 

on 4/18/20at 09:18;  Start 4/18/20 at 08:30;  Stop 4/18/20 at 14:29;  Status DC


Sodium Chloride 1,000 ml @  400 mls/hr Q2H30M PRN IV PATENCY;  Start 4/18/20 at 

08:27;  Stop 4/18/20 at 20:26;  Status DC


Info (PHARMACY MONITORING -- do not chart) 1 each PRN DAILY  PRN MC SEE 

COMMENTS;  Start 4/18/20 at 08:30;  Status Cancel


Info (PHARMACY MONITORING -- do not chart) 1 each PRN DAILY  PRN MC SEE 

COMMENTS;  Start 4/18/20 at 08:30;  Stop 4/26/20 at 13:10;  Status DC


Sodium Chloride 100 meq/Potassium Chloride 40 meq/ Magnesium Sulfate 15 

meq/Calcium Gluconate 15 meq/ Multivitamins 10 ml/Chromium/ Copper/Manganese/ 

Seleni/Zn 0.5 ml/ Insulin Human Regular 35 unit/ Total Parenteral 

Nutrition/Amino Acids/Dextrose/ Fat Emulsion Intravenous 1,400 ml @  58.333 mls/

hr TPN  CONT IV  Last administered on 4/18/20at 22:00;  Start 4/18/20 at 22:00; 

Stop 4/19/20 at 21:59;  Status DC


Potassium Chloride/Water 100 ml @  100 mls/hr 1X  ONCE IV  Last administered on 

4/18/20at 17:28;  Start 4/18/20 at 14:45;  Stop 4/18/20 at 15:44;  Status DC


Sodium Chloride 100 meq/Potassium Chloride 40 meq/ Magnesium Sulfate 15 

meq/Calcium Gluconate 15 meq/ Multivitamins 10 ml/Chromium/ Copper/Manganese/ 

Seleni/Zn 0.5 ml/ Insulin Human Regular 35 unit/ Total Parenteral 

Nutrition/Amino Acids/Dextrose/ Fat Emulsion Intravenous 1,400 ml @  58.333 mls/

hr TPN  CONT IV  Last administered on 4/19/20at 22:46;  Start 4/19/20 at 22:00; 

Stop 4/20/20 at 21:59;  Status DC


Sodium Chloride 100 meq/Potassium Chloride 40 meq/ Magnesium Sulfate 20 

meq/Calcium Gluconate 15 meq/ Multivitamins 10 ml/Chromium/ Copper/Manganese/ S

haley/Zn 0.5 ml/ Insulin Human Regular 35 unit/ Total Parenteral Nutrition/Amino

Acids/Dextrose/ Fat Emulsion Intravenous 1,400 ml @  58.333 mls/ hr TPN  CONT IV

 Last administered on 4/20/20at 22:31;  Start 4/20/20 at 22:00;  Stop 4/21/20 at

21:59;  Status DC


Fentanyl Citrate (Fentanyl 2ml Vial) 50 mcg PRN Q2HR  PRN IVP PAIN Last 

administered on 4/27/20at 01:56;  Start 4/20/20 at 21:00


Fentanyl Citrate (Fentanyl 2ml Vial) 25 mcg PRN Q2HR  PRN IVP PAIN;  Start 

4/20/20 at 21:00


Enoxaparin Sodium (Lovenox 100mg Syringe) 100 mg Q12HR SQ ;  Start 4/21/20 at 

21:00;  Status UNV


Amino Acids/ Glycerin/ Electrolytes 1,000 ml @  75 mls/hr [id number] IV ;  Start 

4/20/20 at 21:15;  Status UNV


Sodium Chloride 1,000 ml @  1,000 mls/hr Q1H PRN IV hypotension;  Start 4/21/20 

at 07:56;  Stop 4/21/20 at 13:55;  Status DC


Albumin Human 200 ml @  200 mls/hr 1X PRN  PRN IV Hypotension Last administered 

on 4/21/20at 08:40;  Start 4/21/20 at 08:00;  Stop 4/21/20 at 13:59;  Status DC


Sodium Chloride 1,000 ml @  400 mls/hr Q2H30M PRN IV PATENCY;  Start 4/21/20 at 

07:56;  Stop 4/21/20 at 19:55;  Status DC


Info (PHARMACY MONITORING -- do not chart) 1 each PRN DAILY  PRN MC SEE 

COMMENTS;  Start 4/21/20 at 08:00;  Status UNV


Info (PHARMACY MONITORING -- do not chart) 1 each PRN DAILY  PRN MC SEE 

COMMENTS;  Start 4/21/20 at 08:00;  Status UNV


Daptomycin 430 mg/ Sodium Chloride 50 ml @  100 mls/hr Q24H IV  Last adminis

tered on 4/21/20at 12:35;  Start 4/21/20 at 09:00;  Stop 4/21/20 at 12:49;  

Status DC


Sodium Chloride 100 meq/Potassium Chloride 40 meq/ Magnesium Sulfate 20 

meq/Calcium Gluconate 15 meq/ Multivitamins 10 ml/Chromium/ Copper/Manganese/ 

Seleni/Zn 0.5 ml/ Insulin Human Regular 35 unit/ Total Parenteral 

Nutrition/Amino Acids/Dextrose/ Fat Emulsion Intravenous 1,400 ml @  58.333 mls/

hr TPN  CONT IV  Last administered on 4/21/20at 21:26;  Start 4/21/20 at 22:00; 

Stop 4/22/20 at 21:59;  Status DC


Daptomycin 430 mg/ Sodium Chloride 50 ml @  100 mls/hr Q48H IV ;  Start 4/23/20 

at 09:00;  Stop 4/22/20 at 11:55;  Status DC


Sodium Chloride 100 meq/Potassium Chloride 40 meq/ Magnesium Sulfate 20 

meq/Calcium Gluconate 15 meq/ Multivitamins 10 ml/Chromium/ Copper/Manganese/ 

Seleni/Zn 0.5 ml/ Insulin Human Regular 35 unit/ Total Parenteral 

Nutrition/Amino Acids/Dextrose/ Fat Emulsion Intravenous 1,400 ml @  58.333 mls/

hr TPN  CONT IV  Last administered on 4/22/20at 22:27;  Start 4/22/20 at 22:00; 

Stop 4/23/20 at 21:59;  Status DC


Daptomycin 430 mg/ Sodium Chloride 50 ml @  100 mls/hr Q24H IV  Last 

administered on 4/24/20at 15:07;  Start 4/22/20 at 13:00;  Stop 4/25/20 at 

13:15;  Status DC


Sodium Chloride 100 meq/Potassium Chloride 40 meq/ Magnesium Sulfate 20 

meq/Calcium Gluconate 10 meq/ Multivitamins 10 ml/Chromium/ Copper/Manganese/ 

Seleni/Zn 0.5 ml/ Insulin Human Regular 35 unit/ Total Parenteral 

Nutrition/Amino Acids/Dextrose/ Fat Emulsion Intravenous 1,400 ml @  58.333 mls/

hr TPN  CONT IV  Last administered on 4/24/20at 00:06;  Start 4/23/20 at 22:00; 

Stop 4/24/20 at 21:59;  Status DC


Alteplase, Recombinant (Cathflo For Central Catheter Clearance) 1 mg 1X  ONCE 

INT CAT  Last administered on 4/24/20at 11:44;  Start 4/24/20 at 10:45;  Stop 

4/24/20 at 10:46;  Status DC


Ondansetron HCl (Zofran) 4 mg PRN Q6HRS  PRN IV NAUSEA/VOMITING;  Start 4/27/20 

at 07:00;  Stop 4/28/20 at 06:59


Fentanyl Citrate (Fentanyl 2ml Vial) 25 mcg PRN Q5MIN  PRN IV MILD PAIN 1-3;  

Start 4/27/20 at 07:00;  Stop 4/28/20 at 06:59


Fentanyl Citrate (Fentanyl 2ml Vial) 50 mcg PRN Q5MIN  PRN IV MODERATE TO SEVERE

PAIN;  Start 4/27/20 at 07:00;  Stop 4/28/20 at 06:59


Ringer's Solution 1,000 ml @  30 mls/hr Q24H IV ;  Start 4/27/20 at 07:00;  Stop

4/27/20 at 18:59


Lidocaine HCl (Xylocaine-Mpf 1% 2ml Vial) 2 ml PRN 1X  PRN ID PRIOR TO IV START;

 Start 4/27/20 at 07:00;  Stop 4/28/20 at 06:59


Prochlorperazine Edisylate (Compazine) 5 mg PACU PRN  PRN IV NAUSEA, MRX1;  

Start 4/27/20 at 07:00;  Stop 4/28/20 at 06:59


Sodium Acetate 50 meq/Potassium Acetate 55 meq/ Magnesium Sulfate 20 meq/Calcium

Gluconate 10 meq/ Multivitamins 10 ml/Chromium/ Copper/Manganese/ Seleni/Zn 0.5 

ml/ Insulin Human Regular 35 unit/ Total Parenteral Nutrition/Amino 

Acids/Dextrose/ Fat Emulsion Intravenous 1,400 ml @  58.333 mls/ hr TPN  CONT IV

;  Start 4/24/20 at 22:00;  Stop 4/24/20 at 14:15;  Status DC


Sodium Acetate 50 meq/Potassium Acetate 55 meq/ Magnesium Sulfate 20 meq/Calcium

Gluconate 10 meq/ Multivitamins 10 ml/Chromium/ Copper/Manganese/ Seleni/Zn 0.5 

ml/ Insulin Human Regular 35 unit/ Total Parenteral Nutrition/Amino 

Acids/Dextrose/ Fat Emulsion Intravenous 1,800 ml @  75 mls/hr TPN  CONT IV  

Last administered on 4/24/20at 22:38;  Start 4/24/20 at 22:00;  Stop 4/25/20 at 

21:59;  Status DC


Sodium Chloride 1,000 ml @  1,000 mls/hr Q1H PRN IV hypotension;  Start 4/24/20 

at 15:31;  Stop 4/24/20 at 21:30;  Status DC


Diphenhydramine HCl (Benadryl) 25 mg 1X PRN  PRN IV ITCHING;  Start 4/24/20 at 

15:45;  Stop 4/25/20 at 15:44;  Status DC


Diphenhydramine HCl (Benadryl) 25 mg 1X PRN  PRN IV ITCHING;  Start 4/24/20 at 

15:45;  Stop 4/25/20 at 15:44;  Status DC


Sodium Chloride 1,000 ml @  400 mls/hr Q2H30M PRN IV PATENCY;  Start 4/24/20 at 

15:31;  Stop 4/25/20 at 03:30;  Status DC


Info (PHARMACY MONITORING -- do not chart) 1 each PRN DAILY  PRN MC SEE 

COMMENTS;  Start 4/24/20 at 15:45


Sodium Acetate 50 meq/Potassium Acetate 55 meq/ Magnesium Sulfate 20 meq/Calcium

Gluconate 10 meq/ Multivitamins 10 ml/Chromium/ Copper/Manganese/ Seleni/Zn 0.5 

ml/ Insulin Human Regular 35 unit/ Total Parenteral Nutrition/Amino 

Acids/Dextrose/ Fat Emulsion Intravenous 1,800 ml @  75 mls/hr TPN  CONT IV  

Last administered on 4/25/20at 22:03;  Start 4/25/20 at 22:00;  Stop 4/26/20 at 

21:59;  Status DC


Daptomycin 430 mg/ Sodium Chloride 50 ml @  100 mls/hr Q24H IV  Last 

administered on 4/26/20at 14:13;  Start 4/25/20 at 13:00


Heparin Sodium (Porcine) 1000 unit/Sodium Chloride 1,001 ml @  1,001 mls/hr 1X  

ONCE IRR ;  Start 4/27/20 at 06:00;  Stop 4/27/20 at 06:59;  Status DC


Potassium Acetate 55 meq/Magnesium Sulfate 20 meq/ Calcium Gluconate 10 meq/ 

Multivitamins 10 ml/Chromium/ Copper/Manganese/ Seleni/Zn 0.5 ml/ Insulin Human 

Regular 35 unit/ Total Parenteral Nutrition/Amino Acids/Dextrose/ Fat Emulsion 

Intravenous 1,920 ml @  80 mls/hr TPN  CONT IV  Last administered on 4/26/20at 

22:10;  Start 4/26/20 at 22:00;  Stop 4/27/20 at 21:59





Active Scripts


Active


Reported


Bisoprolol Fumarate 5 Mg Tablet 10 Mg PO DAILY


Vitals/I & O





Vital Sign - Last 24 Hours








 4/26/20 4/26/20 4/26/20 4/26/20





 09:00 09:00 10:00 10:17


 


Pulse  79 81 


 


Resp  21 29 26


 


B/P (MAP)  122/67 (85) 137/83 (101) 


 


Pulse Ox  98 99 99


 


O2 Delivery Ventilator Ventilator Ventilator Ventilator





 4/26/20 4/26/20 4/26/20 4/26/20





 10:47 11:00 11:41 12:00


 


Temp    100.3





    100.3


 


Pulse  84  90


 


Resp 33 33  36


 


B/P (MAP)  140/71 (94)  128/68 (88)


 


Pulse Ox 99 99 98 99


 


O2 Delivery Ventilator Ventilator Ventilator Ventilator


 


    





    





 4/26/20 4/26/20 4/26/20 4/26/20





 12:00 13:00 13:36 13:56


 


Pulse  80  


 


Resp  25  27


 


B/P (MAP)  117/69 (85)  


 


Pulse Ox  99 99 100


 


O2 Delivery Mechanical Ventilator Ventilator Ventilator Ventilator





 4/26/20 4/26/20 4/26/20 4/26/20





 14:00 14:26 15:00 15:40


 


Pulse 99  93 


 


Resp 37 29 32 


 


B/P (MAP) 140/83 (102)  162/93 (116) 


 


Pulse Ox 100 99 99 99


 


O2 Delivery Ventilator Ventilator Ventilator Ventilator





 4/26/20 4/26/20 4/26/20 4/26/20





 16:00 16:00 16:29 16:59


 


Temp  101.1  





  101.1  


 


Pulse  92  


 


Resp   33 31


 


B/P (MAP)  148/84 (105)  


 


Pulse Ox  100 100 99


 


O2 Delivery Mechanical Ventilator Ventilator Ventilator Ventilator


 


    





    





 4/26/20 4/26/20 4/26/20 4/26/20





 17:00 17:29 18:00 19:00


 


Pulse 113  96 92


 


Resp 36  29 29


 


B/P (MAP) 168/91 (116)  168/92 (117) 140/74 (96)


 


Pulse Ox 98 98 96 98


 


O2 Delivery Ventilator Ventilator Ventilator Ventilator





 4/26/20 4/26/20 4/26/20 4/26/20





 20:00 20:00 20:23 21:00


 


Temp 100.1   





 100.1   


 


Pulse 86   80


 


Resp 34   23


 


B/P (MAP) 120/69 (86)   111/57 (75)


 


Pulse Ox 97  97 97


 


O2 Delivery Ventilator Mechanical Ventilator Ventilator Ventilator


 


    





    





 4/26/20 4/26/20 4/26/20 4/26/20





 22:00 22:49 23:00 23:41


 


Pulse 86  108 


 


Resp 31 26 29 


 


B/P (MAP) 119/79 (92)  131/63 (85) 


 


Pulse Ox 99 99 97 97


 


O2 Delivery Ventilator Ventilator Ventilator Ventilator





 4/27/20 4/27/20 4/27/20 4/27/20





 00:00 00:08 01:00 01:56


 


Temp 101.1   





 101.1   


 


Pulse 108  84 


 


Resp 40  22 18


 


B/P (MAP) 133/83 (100)  117/64 (81) 


 


Pulse Ox 97  97 


 


O2 Delivery Ventilator Mechanical Ventilator Ventilator Ventilator


 


    





    





 4/27/20 4/27/20 4/27/20 4/27/20





 02:00 02:30 03:00 04:00


 


Temp 100.5   100.2





 100.5   100.2


 


Pulse 80  78 78


 


Resp 18  22 28


 


B/P (MAP) 121/73 (89)  147/75 (99) 130/80 (97)


 


Pulse Ox 96 97 98 97


 


O2 Delivery Ventilator Ventilator Ventilator Ventilator


 


    





    





 4/27/20 4/27/20 4/27/20 4/27/20





 04:00 04:54 05:00 06:00


 


Pulse   80 74


 


Resp   18 18


 


B/P (MAP)   124/72 (89) 130/68 (88)


 


Pulse Ox  97 98 97


 


O2 Delivery Mechanical Ventilator Ventilator Ventilator Ventilator





 4/27/20   





 07:48   


 


Pulse Ox 100   


 


O2 Delivery Ventilator   














Intake and Output   


 


 4/26/20 4/26/20 4/27/20





 15:00 23:00 07:00


 


Intake Total 150 ml 1446 ml 1154 ml


 


Output Total 975 ml 1185 ml 915 ml


 


Balance -825 ml 261 ml 239 ml











Hemodynamically unstable?:  No


Is patient in severe pain?:  No


Is NPO status required?:  Yes











GAETANO GONCALVES MD        Apr 27, 2020 08:53

## 2020-04-27 NOTE — PDOC
SURGICAL PROGRESS NOTE


Subjective


Pre-Op Note


50 yo F with severe pancreatitis.


TO OR for laparoscopic versus open cholecystectomy with cholangiogram, 

pancreatic necrosectomy.


R/R/B/A d/w pt and pt's mother.


Vital Signs





Vital Signs








  Date Time  Temp Pulse Resp B/P (MAP) Pulse Ox O2 Delivery O2 Flow Rate FiO2


 


4/27/20 11:00  76 24 155/76 (102) 99 Ventilator  


 


4/27/20 08:00 100.2       





 100.2       








I&O











Intake and Output 


 


 4/27/20





 07:00


 


Intake Total 2750 ml


 


Output Total 3075 ml


 


Balance -325 ml


 


 


 


Intake Oral 0 ml


 


IV Total 2750 ml


 


Output Urine Total 3075 ml








Labs





Laboratory Tests








Test


 4/25/20


12:02 4/25/20


17:54 4/26/20


00:25 4/26/20


05:45


 


Glucose (Fingerstick)


 142 mg/dL


(70-99) 128 mg/dL


(70-99) 129 mg/dL


(70-99) 137 mg/dL


(70-99)


 


Test


 4/26/20


06:00 4/26/20


12:09 4/26/20


17:28 4/27/20


00:41


 


White Blood Count


 9.3 x10^3/uL


(4.0-11.0) 


 


 





 


Red Blood Count


 2.60 x10^6/uL


(3.50-5.40) 


 


 





 


Hemoglobin


 7.7 g/dL


(12.0-15.5) 


 


 





 


Hematocrit


 23.6 %


(36.0-47.0) 


 


 





 


Mean Corpuscular Volume 91 fL ()    


 


Mean Corpuscular Hemoglobin 30 pg (25-35)    


 


Mean Corpuscular Hemoglobin


Concent 33 g/dL


(31-37) 


 


 





 


Red Cell Distribution Width


 18.3 %


(11.5-14.5) 


 


 





 


Platelet Count


 286 x10^3/uL


(140-400) 


 


 





 


Neutrophils (%) (Auto) 79 % (31-73)    


 


Lymphocytes (%) (Auto) 14 % (24-48)    


 


Monocytes (%) (Auto) 6 % (0-9)    


 


Eosinophils (%) (Auto) 1 % (0-3)    


 


Basophils (%) (Auto) 0 % (0-3)    


 


Neutrophils # (Auto)


 7.4 x10^3/uL


(1.8-7.7) 


 


 





 


Lymphocytes # (Auto)


 1.2 x10^3/uL


(1.0-4.8) 


 


 





 


Monocytes # (Auto)


 0.6 x10^3/uL


(0.0-1.1) 


 


 





 


Eosinophils # (Auto)


 0.1 x10^3/uL


(0.0-0.7) 


 


 





 


Basophils # (Auto)


 0.0 x10^3/uL


(0.0-0.2) 


 


 





 


Sodium Level


 149 mmol/L


(136-145) 


 


 





 


Potassium Level


 3.5 mmol/L


(3.5-5.1) 


 


 





 


Chloride Level


 110 mmol/L


() 


 


 





 


Carbon Dioxide Level


 28 mmol/L


(21-32) 


 


 





 


Anion Gap 11 (6-14)    


 


Blood Urea Nitrogen


 60 mg/dL


(7-20) 


 


 





 


Creatinine


 1.0 mg/dL


(0.6-1.0) 


 


 





 


Estimated GFR


(Cockcroft-Gault) 58.9 


 


 


 





 


Glucose Level


 142 mg/dL


(70-99) 


 


 





 


Calcium Level


 8.6 mg/dL


(8.5-10.1) 


 


 





 


Glucose (Fingerstick)


 


 148 mg/dL


(70-99) 144 mg/dL


(70-99) 134 mg/dL


(70-99)


 


Test


 4/27/20


04:30 


 


 





 


White Blood Count


 9.2 x10^3/uL


(4.0-11.0) 


 


 





 


Red Blood Count


 2.72 x10^6/uL


(3.50-5.40) 


 


 





 


Hemoglobin


 8.2 g/dL


(12.0-15.5) 


 


 





 


Hematocrit


 24.6 %


(36.0-47.0) 


 


 





 


Mean Corpuscular Volume 90 fL ()    


 


Mean Corpuscular Hemoglobin 30 pg (25-35)    


 


Mean Corpuscular Hemoglobin


Concent 33 g/dL


(31-37) 


 


 





 


Red Cell Distribution Width


 18.4 %


(11.5-14.5) 


 


 





 


Platelet Count


 323 x10^3/uL


(140-400) 


 


 





 


Neutrophils (%) (Auto) 78 % (31-73)    


 


Lymphocytes (%) (Auto) 15 % (24-48)    


 


Monocytes (%) (Auto) 6 % (0-9)    


 


Eosinophils (%) (Auto) 1 % (0-3)    


 


Basophils (%) (Auto) 0 % (0-3)    


 


Neutrophils # (Auto)


 7.1 x10^3/uL


(1.8-7.7) 


 


 





 


Lymphocytes # (Auto)


 1.4 x10^3/uL


(1.0-4.8) 


 


 





 


Monocytes # (Auto)


 0.5 x10^3/uL


(0.0-1.1) 


 


 





 


Eosinophils # (Auto)


 0.1 x10^3/uL


(0.0-0.7) 


 


 





 


Basophils # (Auto)


 0.0 x10^3/uL


(0.0-0.2) 


 


 





 


Sodium Level


 149 mmol/L


(136-145) 


 


 





 


Potassium Level


 3.6 mmol/L


(3.5-5.1) 


 


 





 


Chloride Level


 111 mmol/L


() 


 


 





 


Carbon Dioxide Level


 30 mmol/L


(21-32) 


 


 





 


Anion Gap 8 (6-14)    


 


Blood Urea Nitrogen


 57 mg/dL


(7-20) 


 


 





 


Creatinine


 1.1 mg/dL


(0.6-1.0) 


 


 





 


Estimated GFR


(Cockcroft-Gault) 52.8 


 


 


 





 


Glucose Level


 123 mg/dL


(70-99) 


 


 





 


Calcium Level


 8.5 mg/dL


(8.5-10.1) 


 


 





 


Creatine Kinase


 24 U/L


() 


 


 











Laboratory Tests








Test


 4/26/20


12:09 4/26/20


17:28 4/27/20


00:41 4/27/20


04:30


 


Glucose (Fingerstick)


 148 mg/dL


(70-99) 144 mg/dL


(70-99) 134 mg/dL


(70-99) 





 


White Blood Count


 


 


 


 9.2 x10^3/uL


(4.0-11.0)


 


Red Blood Count


 


 


 


 2.72 x10^6/uL


(3.50-5.40)


 


Hemoglobin


 


 


 


 8.2 g/dL


(12.0-15.5)


 


Hematocrit


 


 


 


 24.6 %


(36.0-47.0)


 


Mean Corpuscular Volume    90 fL () 


 


Mean Corpuscular Hemoglobin    30 pg (25-35) 


 


Mean Corpuscular Hemoglobin


Concent 


 


 


 33 g/dL


(31-37)


 


Red Cell Distribution Width


 


 


 


 18.4 %


(11.5-14.5)


 


Platelet Count


 


 


 


 323 x10^3/uL


(140-400)


 


Neutrophils (%) (Auto)    78 % (31-73) 


 


Lymphocytes (%) (Auto)    15 % (24-48) 


 


Monocytes (%) (Auto)    6 % (0-9) 


 


Eosinophils (%) (Auto)    1 % (0-3) 


 


Basophils (%) (Auto)    0 % (0-3) 


 


Neutrophils # (Auto)


 


 


 


 7.1 x10^3/uL


(1.8-7.7)


 


Lymphocytes # (Auto)


 


 


 


 1.4 x10^3/uL


(1.0-4.8)


 


Monocytes # (Auto)


 


 


 


 0.5 x10^3/uL


(0.0-1.1)


 


Eosinophils # (Auto)


 


 


 


 0.1 x10^3/uL


(0.0-0.7)


 


Basophils # (Auto)


 


 


 


 0.0 x10^3/uL


(0.0-0.2)


 


Sodium Level


 


 


 


 149 mmol/L


(136-145)


 


Potassium Level


 


 


 


 3.6 mmol/L


(3.5-5.1)


 


Chloride Level


 


 


 


 111 mmol/L


()


 


Carbon Dioxide Level


 


 


 


 30 mmol/L


(21-32)


 


Anion Gap    8 (6-14) 


 


Blood Urea Nitrogen


 


 


 


 57 mg/dL


(7-20)


 


Creatinine


 


 


 


 1.1 mg/dL


(0.6-1.0)


 


Estimated GFR


(Cockcroft-Gault) 


 


 


 52.8 





 


Glucose Level


 


 


 


 123 mg/dL


(70-99)


 


Calcium Level


 


 


 


 8.5 mg/dL


(8.5-10.1)


 


Creatine Kinase


 


 


 


 24 U/L


()








Problem List


Problems


Medical Problems:


(1) Acute pancreatitis


Status: Acute  





(2) Cholelithiasis


Status: Acute  

















GAMAL ZHOU MD             Apr 27, 2020 11:30

## 2020-04-27 NOTE — RAD
EXAM: CHEST ONE VIEW.

 

HISTORY: Ventilated, respiratory failure.

 

COMPARISON: 04/19/2020.

 

FINDINGS: A frontal view of the chest is obtained. A tracheostomy 

appliance is in expected position. A nasogastric tube has its tip below 

the inferior margin of the view.

 

The inspiration is small. Bibasilar opacities indicate small to moderate 

bilateral pleural effusions along with atelectasis or infiltrate. There is

no pneumothorax. The heart is not enlarged.

 

IMPRESSION: 

1. Small inspiration with stable small moderate bilateral pleural 

effusions and associated atelectasis or infiltrate.

 

Electronically signed by: ASIF Tripathi MD (4/27/2020 7:47 AM) 

[id number]

## 2020-04-27 NOTE — PDOC
Renal-Progress Notes


Subjective Notes


Notes


AWAKE ALERT AND TALKING





History of Present Illness


Hx of present illness


MUCH IMPROVED





Vitals


Vitals





Vital Signs








  Date Time  Temp Pulse Resp B/P (MAP) Pulse Ox O2 Delivery O2 Flow Rate FiO2


 


4/27/20 11:09     99 Ventilator  


 


4/27/20 11:00  76 24 155/76 (102)    


 


4/27/20 08:00 100.2       





 100.2       








Weight


Weight [ ]





I.O.


Intake and Output











Intake and Output 


 


 4/27/20





 07:00


 


Intake Total 2750 ml


 


Output Total 3075 ml


 


Balance -325 ml


 


 


 


Intake Oral 0 ml


 


IV Total 2750 ml


 


Output Urine Total 3075 ml











Labs


Labs





Laboratory Tests








Test


 4/26/20


12:09 4/26/20


17:28 4/27/20


00:41 4/27/20


04:30


 


Glucose (Fingerstick)


 148 mg/dL


(70-99) 144 mg/dL


(70-99) 134 mg/dL


(70-99) 





 


White Blood Count


 


 


 


 9.2 x10^3/uL


(4.0-11.0)


 


Red Blood Count


 


 


 


 2.72 x10^6/uL


(3.50-5.40)


 


Hemoglobin


 


 


 


 8.2 g/dL


(12.0-15.5)


 


Hematocrit


 


 


 


 24.6 %


(36.0-47.0)


 


Mean Corpuscular Volume    90 fL () 


 


Mean Corpuscular Hemoglobin    30 pg (25-35) 


 


Mean Corpuscular Hemoglobin


Concent 


 


 


 33 g/dL


(31-37)


 


Red Cell Distribution Width


 


 


 


 18.4 %


(11.5-14.5)


 


Platelet Count


 


 


 


 323 x10^3/uL


(140-400)


 


Neutrophils (%) (Auto)    78 % (31-73) 


 


Lymphocytes (%) (Auto)    15 % (24-48) 


 


Monocytes (%) (Auto)    6 % (0-9) 


 


Eosinophils (%) (Auto)    1 % (0-3) 


 


Basophils (%) (Auto)    0 % (0-3) 


 


Neutrophils # (Auto)


 


 


 


 7.1 x10^3/uL


(1.8-7.7)


 


Lymphocytes # (Auto)


 


 


 


 1.4 x10^3/uL


(1.0-4.8)


 


Monocytes # (Auto)


 


 


 


 0.5 x10^3/uL


(0.0-1.1)


 


Eosinophils # (Auto)


 


 


 


 0.1 x10^3/uL


(0.0-0.7)


 


Basophils # (Auto)


 


 


 


 0.0 x10^3/uL


(0.0-0.2)


 


Sodium Level


 


 


 


 149 mmol/L


(136-145)


 


Potassium Level


 


 


 


 3.6 mmol/L


(3.5-5.1)


 


Chloride Level


 


 


 


 111 mmol/L


()


 


Carbon Dioxide Level


 


 


 


 30 mmol/L


(21-32)


 


Anion Gap    8 (6-14) 


 


Blood Urea Nitrogen


 


 


 


 57 mg/dL


(7-20)


 


Creatinine


 


 


 


 1.1 mg/dL


(0.6-1.0)


 


Estimated GFR


(Cockcroft-Gault) 


 


 


 52.8 





 


Glucose Level


 


 


 


 123 mg/dL


(70-99)


 


Calcium Level


 


 


 


 8.5 mg/dL


(8.5-10.1)


 


Creatine Kinase


 


 


 


 24 U/L


()











Micro


Micro





Microbiology


4/15/20 Aerobic and Anaerobic Culture - Final, Complete


          


4/15/20 Anaerobic Culture Result 1 (ANSON) - Final, Complete


          


4/15/20 Aerobic Culture - Final, Complete


          


4/15/20 Aerobic Culture Result 1 (ANSON) - Final, Complete


          


4/15/20 Gram Stain - Final, Complete


          


4/15/20 Gram Stain Result 1 (ANSON) - Final, Complete


          


4/15/20 Gram Stain Result 2 (ANSON) - Final, Complete


          


4/14/20 Blood Culture - Final, Complete


          NO GROWTH AFTER 5 DAYS


4/12/20 Urine Culture - Final, Complete


          


4/12/20 Urine Culture Result 1 (ANSON) - Final, Complete





Review of Systems


Constitutional:  yes: other (ON THE VENT)





Physical Exam


General Appearance:  other (ON THE VENT, TRACH)


Skin:  warm


Respiratory:  decreased breath sounds


Heart:  S1S2


Abdomen:  soft, bowel sounds present


Genitourinary:  bladder flat


Extremities:  pulses present, edema


Neurology:  alert, oriented, other





Assessment


Assessment


IMP





JED-ATN-MUCH IMPROVED AND OFF HD FOR NOW


ANEMIA


LEUCOCYTOSIS-RESOLVED


ANASARCA DUE TO 3RD SPACING


HYPERKALEMIA-RESOLVED


ACIDOSIS AND ACIDEMIA-RESOLVED


ACUTE REPS FAILURE-TRACH


ACUTE PANCREATITIS


HYPOALBUMINEMIA








PLAN





NO HD FOR NOW


TPN TO CONTINUE AS NEEDED


PRBC AS NEEDED


CHECK IRON STORES


START YOLI IF NEEDED


VENT SUPPORT


ANTIBIOTICS


WILL NEED LTAC


UPDATED FAMILY


WILL FOLLOW











BETH HEIN MD                 Apr 27, 2020 12:01

## 2020-04-28 VITALS — DIASTOLIC BLOOD PRESSURE: 63 MMHG | SYSTOLIC BLOOD PRESSURE: 118 MMHG

## 2020-04-28 VITALS — SYSTOLIC BLOOD PRESSURE: 127 MMHG | DIASTOLIC BLOOD PRESSURE: 90 MMHG

## 2020-04-28 VITALS — SYSTOLIC BLOOD PRESSURE: 94 MMHG | DIASTOLIC BLOOD PRESSURE: 47 MMHG

## 2020-04-28 VITALS — DIASTOLIC BLOOD PRESSURE: 76 MMHG | SYSTOLIC BLOOD PRESSURE: 119 MMHG

## 2020-04-28 VITALS — DIASTOLIC BLOOD PRESSURE: 63 MMHG | SYSTOLIC BLOOD PRESSURE: 140 MMHG

## 2020-04-28 VITALS — DIASTOLIC BLOOD PRESSURE: 81 MMHG | SYSTOLIC BLOOD PRESSURE: 154 MMHG

## 2020-04-28 VITALS — DIASTOLIC BLOOD PRESSURE: 78 MMHG | SYSTOLIC BLOOD PRESSURE: 119 MMHG

## 2020-04-28 VITALS — DIASTOLIC BLOOD PRESSURE: 88 MMHG | SYSTOLIC BLOOD PRESSURE: 194 MMHG

## 2020-04-28 VITALS — DIASTOLIC BLOOD PRESSURE: 47 MMHG | SYSTOLIC BLOOD PRESSURE: 94 MMHG

## 2020-04-28 VITALS — SYSTOLIC BLOOD PRESSURE: 184 MMHG | DIASTOLIC BLOOD PRESSURE: 87 MMHG

## 2020-04-28 VITALS — DIASTOLIC BLOOD PRESSURE: 75 MMHG | SYSTOLIC BLOOD PRESSURE: 131 MMHG

## 2020-04-28 VITALS — SYSTOLIC BLOOD PRESSURE: 117 MMHG | DIASTOLIC BLOOD PRESSURE: 73 MMHG

## 2020-04-28 VITALS — SYSTOLIC BLOOD PRESSURE: 123 MMHG | DIASTOLIC BLOOD PRESSURE: 71 MMHG

## 2020-04-28 VITALS — SYSTOLIC BLOOD PRESSURE: 108 MMHG | DIASTOLIC BLOOD PRESSURE: 68 MMHG

## 2020-04-28 VITALS — SYSTOLIC BLOOD PRESSURE: 98 MMHG | DIASTOLIC BLOOD PRESSURE: 57 MMHG

## 2020-04-28 VITALS — SYSTOLIC BLOOD PRESSURE: 131 MMHG | DIASTOLIC BLOOD PRESSURE: 76 MMHG

## 2020-04-28 VITALS — SYSTOLIC BLOOD PRESSURE: 126 MMHG | DIASTOLIC BLOOD PRESSURE: 84 MMHG

## 2020-04-28 VITALS — DIASTOLIC BLOOD PRESSURE: 43 MMHG | SYSTOLIC BLOOD PRESSURE: 83 MMHG

## 2020-04-28 VITALS — DIASTOLIC BLOOD PRESSURE: 67 MMHG | SYSTOLIC BLOOD PRESSURE: 113 MMHG

## 2020-04-28 VITALS — DIASTOLIC BLOOD PRESSURE: 80 MMHG | SYSTOLIC BLOOD PRESSURE: 107 MMHG

## 2020-04-28 VITALS — DIASTOLIC BLOOD PRESSURE: 65 MMHG | SYSTOLIC BLOOD PRESSURE: 92 MMHG

## 2020-04-28 VITALS — SYSTOLIC BLOOD PRESSURE: 108 MMHG | DIASTOLIC BLOOD PRESSURE: 87 MMHG

## 2020-04-28 VITALS — DIASTOLIC BLOOD PRESSURE: 101 MMHG | SYSTOLIC BLOOD PRESSURE: 153 MMHG

## 2020-04-28 VITALS — DIASTOLIC BLOOD PRESSURE: 45 MMHG | SYSTOLIC BLOOD PRESSURE: 86 MMHG

## 2020-04-28 LAB
ANION GAP SERPL CALC-SCNC: 11 MMOL/L (ref 6–14)
BASOPHILS # BLD AUTO: 0 X10^3/UL (ref 0–0.2)
BASOPHILS NFR BLD: 0 % (ref 0–3)
BUN SERPL-MCNC: 63 MG/DL (ref 7–20)
CALCIUM SERPL-MCNC: 8.8 MG/DL (ref 8.5–10.1)
CHLORIDE SERPL-SCNC: 111 MMOL/L (ref 98–107)
CO2 SERPL-SCNC: 28 MMOL/L (ref 21–32)
CREAT SERPL-MCNC: 0.9 MG/DL (ref 0.6–1)
EOSINOPHIL NFR BLD: 0 % (ref 0–3)
EOSINOPHIL NFR BLD: 0 X10^3/UL (ref 0–0.7)
ERYTHROCYTE [DISTWIDTH] IN BLOOD BY AUTOMATED COUNT: 18.3 % (ref 11.5–14.5)
GFR SERPLBLD BASED ON 1.73 SQ M-ARVRAT: 66.5 ML/MIN
GLUCOSE SERPL-MCNC: 162 MG/DL (ref 70–99)
HCT VFR BLD CALC: 37.5 % (ref 36–47)
HGB BLD-MCNC: 12.1 G/DL (ref 12–15.5)
LYMPHOCYTES # BLD: 1.3 X10^3/UL (ref 1–4.8)
LYMPHOCYTES NFR BLD AUTO: 20 % (ref 24–48)
MAGNESIUM SERPL-MCNC: 2 MG/DL (ref 1.8–2.4)
MCH RBC QN AUTO: 29 PG (ref 25–35)
MCHC RBC AUTO-ENTMCNC: 32 G/DL (ref 31–37)
MCV RBC AUTO: 91 FL (ref 79–100)
MONO #: 0.3 X10^3/UL (ref 0–1.1)
MONOCYTES NFR BLD: 5 % (ref 0–9)
NEUT #: 4.7 X10^3/UL (ref 1.8–7.7)
NEUTROPHILS NFR BLD AUTO: 74 % (ref 31–73)
PHOSPHATE SERPL-MCNC: 4.7 MG/DL (ref 2.6–4.7)
PLATELET # BLD AUTO: 267 X10^3/UL (ref 140–400)
POTASSIUM SERPL-SCNC: 3.9 MMOL/L (ref 3.5–5.1)
RBC # BLD AUTO: 4.12 X10^6/UL (ref 3.5–5.4)
SODIUM SERPL-SCNC: 150 MMOL/L (ref 136–145)
WBC # BLD AUTO: 6.3 X10^3/UL (ref 4–11)

## 2020-04-28 RX ADMIN — HEPARIN SODIUM SCH UNIT: 5000 INJECTION, SOLUTION INTRAVENOUS; SUBCUTANEOUS at 22:03

## 2020-04-28 RX ADMIN — DEXMEDETOMIDINE HYDROCHLORIDE PRN MLS/HR: 100 INJECTION, SOLUTION, CONCENTRATE INTRAVENOUS at 16:47

## 2020-04-28 RX ADMIN — DEXMEDETOMIDINE HYDROCHLORIDE PRN MLS/HR: 100 INJECTION, SOLUTION, CONCENTRATE INTRAVENOUS at 08:47

## 2020-04-28 RX ADMIN — Medication PRN MLS/HR: at 12:27

## 2020-04-28 RX ADMIN — DEXMEDETOMIDINE HYDROCHLORIDE PRN MLS/HR: 100 INJECTION, SOLUTION, CONCENTRATE INTRAVENOUS at 05:27

## 2020-04-28 RX ADMIN — DEXTROSE SCH MLS/HR: 50 INJECTION, SOLUTION INTRAVENOUS at 08:39

## 2020-04-28 RX ADMIN — INSULIN LISPRO SCH UNITS: 100 INJECTION, SOLUTION INTRAVENOUS; SUBCUTANEOUS at 18:00

## 2020-04-28 RX ADMIN — INSULIN LISPRO SCH UNITS: 100 INJECTION, SOLUTION INTRAVENOUS; SUBCUTANEOUS at 23:40

## 2020-04-28 RX ADMIN — Medication PRN EACH: at 12:51

## 2020-04-28 RX ADMIN — HEPARIN SODIUM SCH UNIT: 5000 INJECTION, SOLUTION INTRAVENOUS; SUBCUTANEOUS at 08:45

## 2020-04-28 RX ADMIN — BACITRACIN SCH MLS/HR: 5000 INJECTION, POWDER, FOR SOLUTION INTRAMUSCULAR at 13:37

## 2020-04-28 RX ADMIN — INSULIN LISPRO SCH UNITS: 100 INJECTION, SOLUTION INTRAVENOUS; SUBCUTANEOUS at 01:01

## 2020-04-28 RX ADMIN — DEXMEDETOMIDINE HYDROCHLORIDE PRN MLS/HR: 100 INJECTION, SOLUTION, CONCENTRATE INTRAVENOUS at 14:31

## 2020-04-28 RX ADMIN — INSULIN LISPRO SCH UNITS: 100 INJECTION, SOLUTION INTRAVENOUS; SUBCUTANEOUS at 12:00

## 2020-04-28 RX ADMIN — DEXMEDETOMIDINE HYDROCHLORIDE PRN MLS/HR: 100 INJECTION, SOLUTION, CONCENTRATE INTRAVENOUS at 19:48

## 2020-04-28 RX ADMIN — DEXMEDETOMIDINE HYDROCHLORIDE PRN MLS/HR: 100 INJECTION, SOLUTION, CONCENTRATE INTRAVENOUS at 01:37

## 2020-04-28 RX ADMIN — MEROPENEM SCH MLS/HR: 1 INJECTION, POWDER, FOR SOLUTION INTRAVENOUS at 22:01

## 2020-04-28 RX ADMIN — MEROPENEM SCH MLS/HR: 500 INJECTION, POWDER, FOR SOLUTION INTRAVENOUS at 10:45

## 2020-04-28 RX ADMIN — INSULIN LISPRO SCH UNITS: 100 INJECTION, SOLUTION INTRAVENOUS; SUBCUTANEOUS at 08:42

## 2020-04-28 RX ADMIN — PANTOPRAZOLE SODIUM SCH MG: 40 INJECTION, POWDER, FOR SOLUTION INTRAVENOUS at 08:39

## 2020-04-28 RX ADMIN — MEROPENEM SCH MLS/HR: 1 INJECTION, POWDER, FOR SOLUTION INTRAVENOUS at 16:45

## 2020-04-28 RX ADMIN — DEXMEDETOMIDINE HYDROCHLORIDE PRN MLS/HR: 100 INJECTION, SOLUTION, CONCENTRATE INTRAVENOUS at 23:34

## 2020-04-28 RX ADMIN — DAPTOMYCIN SCH MLS/HR: 500 INJECTION, POWDER, LYOPHILIZED, FOR SOLUTION INTRAVENOUS at 14:32

## 2020-04-28 RX ADMIN — BUMETANIDE SCH MG: 0.25 INJECTION INTRAMUSCULAR; INTRAVENOUS at 16:45

## 2020-04-28 NOTE — NUR
Pharmacy TPN Dosing Note



S: JESENIA RICH is a 49 year old F Currently receiving Central Continuous TPN started 
03/18/20



B:Pertinent PMH: Necrotizing pancreatitis



Height: 5 feet, 8 inches

Weight: 108.5 kg



Current diet: NPO 



LABS:

Sodium:    150 

Potassium: 3.9 

Chloride:  111 

Calcium:   8.8 

Corrected Calcium: 9.84 

Magnesium: 2 

CO2:       28 

SCr:       0.9 

Glucose:   162,  148 

Albumin:   2.7 

AST:       23 

ALT:       11 



TPN FORMULA:

TPN TYPE:  Central Continuous

AMINO ACIDS:         125 gm

DEXTROSE:            225 gm

LIPIDS:              20 gm

POTASSIUM ACETATE:   55 mEq

MAGNESIUM:           20 mEq

CALCIUM:             10 mEq

INSULIN:             35 units

MULTIPLE VITAMIN:    10 ml

TRACE ELEMENTS:      0.5 ml



TPN PLAN:  

-Serum sodium still elevated, however hesitant to increase TPN rate as pt has anasarca due 
to 3rd spacing and requiring IV Bumex.No changes in rate.

-Other electrolytes and blood glucose appear WNL and stable.

-BMP tomorrow per renal.





R: Continue TPN @ current rate and with above formula.

Will monitor electrolytes, glucose, and tolerance to TPN.



 VALERIE SNELL [facility], 04/28/20 [id number]

## 2020-04-28 NOTE — PDOC
SURGICAL PROGRESS NOTE


Subjective


Pt sitting up in chair on vent, alert


Vital Signs





Vital Signs








  Date Time  Temp Pulse Resp B/P (MAP) Pulse Ox O2 Delivery O2 Flow Rate FiO2


 


4/28/20 16:00      Trach Collar  


 


4/28/20 14:50     99  8.0 


 


4/28/20 13:00   21     


 


4/28/20 06:00  72  131/76 (94)    


 


4/28/20 04:00 98.7       





 98.7       








I&O











Intake and Output 


 


 4/28/20





 07:00


 


Intake Total 3470 ml


 


Output Total 2985 ml


 


Balance 485 ml


 


 


 


Intake Oral 0 ml


 


IV Total 3470 ml


 


Output Urine Total 2785 ml


 


Drainage Total 200 ml








General:  Alert, No acute distress


Abdomen:  Soft, Other (ROBERT serous)


Labs





Laboratory Tests








Test


 4/26/20


17:28 4/27/20


00:41 4/27/20


04:30 4/27/20


13:40


 


Glucose (Fingerstick)


 144 mg/dL


(70-99) 134 mg/dL


(70-99) 


 163 mg/dL


(70-99)


 


White Blood Count


 


 


 9.2 x10^3/uL


(4.0-11.0) 





 


Red Blood Count


 


 


 2.72 x10^6/uL


(3.50-5.40) 





 


Hemoglobin


 


 


 8.2 g/dL


(12.0-15.5) 





 


Hematocrit


 


 


 24.6 %


(36.0-47.0) 





 


Mean Corpuscular Volume   90 fL ()  


 


Mean Corpuscular Hemoglobin   30 pg (25-35)  


 


Mean Corpuscular Hemoglobin


Concent 


 


 33 g/dL


(31-37) 





 


Red Cell Distribution Width


 


 


 18.4 %


(11.5-14.5) 





 


Platelet Count


 


 


 323 x10^3/uL


(140-400) 





 


Neutrophils (%) (Auto)   78 % (31-73)  


 


Lymphocytes (%) (Auto)   15 % (24-48)  


 


Monocytes (%) (Auto)   6 % (0-9)  


 


Eosinophils (%) (Auto)   1 % (0-3)  


 


Basophils (%) (Auto)   0 % (0-3)  


 


Neutrophils # (Auto)


 


 


 7.1 x10^3/uL


(1.8-7.7) 





 


Lymphocytes # (Auto)


 


 


 1.4 x10^3/uL


(1.0-4.8) 





 


Monocytes # (Auto)


 


 


 0.5 x10^3/uL


(0.0-1.1) 





 


Eosinophils # (Auto)


 


 


 0.1 x10^3/uL


(0.0-0.7) 





 


Basophils # (Auto)


 


 


 0.0 x10^3/uL


(0.0-0.2) 





 


Sodium Level


 


 


 149 mmol/L


(136-145) 





 


Potassium Level


 


 


 3.6 mmol/L


(3.5-5.1) 





 


Chloride Level


 


 


 111 mmol/L


() 





 


Carbon Dioxide Level


 


 


 30 mmol/L


(21-32) 





 


Anion Gap   8 (6-14)  


 


Blood Urea Nitrogen


 


 


 57 mg/dL


(7-20) 





 


Creatinine


 


 


 1.1 mg/dL


(0.6-1.0) 





 


Estimated GFR


(Cockcroft-Gault) 


 


 52.8 


 





 


Glucose Level


 


 


 123 mg/dL


(70-99) 





 


Calcium Level


 


 


 8.5 mg/dL


(8.5-10.1) 





 


Creatine Kinase


 


 


 24 U/L


() 





 


Test


 4/27/20


18:04 4/28/20


00:57 4/28/20


07:00 4/28/20


12:08


 


Glucose (Fingerstick)


 208 mg/dL


(70-99) 208 mg/dL


(70-99) 


 148 mg/dL


(70-99)


 


White Blood Count


 


 


 6.3 x10^3/uL


(4.0-11.0) 





 


Red Blood Count


 


 


 4.12 x10^6/uL


(3.50-5.40) 





 


Hemoglobin


 


 


 12.1 g/dL


(12.0-15.5) 





 


Hematocrit


 


 


 37.5 %


(36.0-47.0) 





 


Mean Corpuscular Volume   91 fL ()  


 


Mean Corpuscular Hemoglobin   29 pg (25-35)  


 


Mean Corpuscular Hemoglobin


Concent 


 


 32 g/dL


(31-37) 





 


Red Cell Distribution Width


 


 


 18.3 %


(11.5-14.5) 





 


Platelet Count


 


 


 267 x10^3/uL


(140-400) 





 


Neutrophils (%) (Auto)   74 % (31-73)  


 


Lymphocytes (%) (Auto)   20 % (24-48)  


 


Monocytes (%) (Auto)   5 % (0-9)  


 


Eosinophils (%) (Auto)   0 % (0-3)  


 


Basophils (%) (Auto)   0 % (0-3)  


 


Neutrophils # (Auto)


 


 


 4.7 x10^3/uL


(1.8-7.7) 





 


Lymphocytes # (Auto)


 


 


 1.3 x10^3/uL


(1.0-4.8) 





 


Monocytes # (Auto)


 


 


 0.3 x10^3/uL


(0.0-1.1) 





 


Eosinophils # (Auto)


 


 


 0.0 x10^3/uL


(0.0-0.7) 





 


Basophils # (Auto)


 


 


 0.0 x10^3/uL


(0.0-0.2) 





 


Sodium Level


 


 


 150 mmol/L


(136-145) 





 


Potassium Level


 


 


 3.9 mmol/L


(3.5-5.1) 





 


Chloride Level


 


 


 111 mmol/L


() 





 


Carbon Dioxide Level


 


 


 28 mmol/L


(21-32) 





 


Anion Gap   11 (6-14)  


 


Blood Urea Nitrogen


 


 


 63 mg/dL


(7-20) 





 


Creatinine


 


 


 0.9 mg/dL


(0.6-1.0) 





 


Estimated GFR


(Cockcroft-Gault) 


 


 66.5 


 





 


Glucose Level


 


 


 162 mg/dL


(70-99) 





 


Calcium Level


 


 


 8.8 mg/dL


(8.5-10.1) 





 


Phosphorus Level


 


 


 4.7 mg/dL


(2.6-4.7) 





 


Magnesium Level


 


 


 2.0 mg/dL


(1.8-2.4) 





 


Iron Level


 


 


 23 ug/dL


() 





 


Total Iron Binding Capacity


 


 


 76 ug/dL


(250-450) 





 


Iron Saturation   30 % (15-34)  








Laboratory Tests








Test


 4/27/20


18:04 4/28/20


00:57 4/28/20


07:00 4/28/20


12:08


 


Glucose (Fingerstick)


 208 mg/dL


(70-99) 208 mg/dL


(70-99) 


 148 mg/dL


(70-99)


 


White Blood Count


 


 


 6.3 x10^3/uL


(4.0-11.0) 





 


Red Blood Count


 


 


 4.12 x10^6/uL


(3.50-5.40) 





 


Hemoglobin


 


 


 12.1 g/dL


(12.0-15.5) 





 


Hematocrit


 


 


 37.5 %


(36.0-47.0) 





 


Mean Corpuscular Volume   91 fL ()  


 


Mean Corpuscular Hemoglobin   29 pg (25-35)  


 


Mean Corpuscular Hemoglobin


Concent 


 


 32 g/dL


(31-37) 





 


Red Cell Distribution Width


 


 


 18.3 %


(11.5-14.5) 





 


Platelet Count


 


 


 267 x10^3/uL


(140-400) 





 


Neutrophils (%) (Auto)   74 % (31-73)  


 


Lymphocytes (%) (Auto)   20 % (24-48)  


 


Monocytes (%) (Auto)   5 % (0-9)  


 


Eosinophils (%) (Auto)   0 % (0-3)  


 


Basophils (%) (Auto)   0 % (0-3)  


 


Neutrophils # (Auto)


 


 


 4.7 x10^3/uL


(1.8-7.7) 





 


Lymphocytes # (Auto)


 


 


 1.3 x10^3/uL


(1.0-4.8) 





 


Monocytes # (Auto)


 


 


 0.3 x10^3/uL


(0.0-1.1) 





 


Eosinophils # (Auto)


 


 


 0.0 x10^3/uL


(0.0-0.7) 





 


Basophils # (Auto)


 


 


 0.0 x10^3/uL


(0.0-0.2) 





 


Sodium Level


 


 


 150 mmol/L


(136-145) 





 


Potassium Level


 


 


 3.9 mmol/L


(3.5-5.1) 





 


Chloride Level


 


 


 111 mmol/L


() 





 


Carbon Dioxide Level


 


 


 28 mmol/L


(21-32) 





 


Anion Gap   11 (6-14)  


 


Blood Urea Nitrogen


 


 


 63 mg/dL


(7-20) 





 


Creatinine


 


 


 0.9 mg/dL


(0.6-1.0) 





 


Estimated GFR


(Cockcroft-Gault) 


 


 66.5 


 





 


Glucose Level


 


 


 162 mg/dL


(70-99) 





 


Calcium Level


 


 


 8.8 mg/dL


(8.5-10.1) 





 


Phosphorus Level


 


 


 4.7 mg/dL


(2.6-4.7) 





 


Magnesium Level


 


 


 2.0 mg/dL


(1.8-2.4) 





 


Iron Level


 


 


 23 ug/dL


() 





 


Total Iron Binding Capacity


 


 


 76 ug/dL


(250-450) 





 


Iron Saturation   30 % (15-34)  








Problem List


Problems


Medical Problems:


(1) Acute pancreatitis


Status: Acute  





(2) Cholelithiasis


Status: Acute  








Assessment/Plan


s/p lap exploration


cont supportive care


cholecystectomy in 3 months.











GAMAL ZHOU MD             Apr 28, 2020 16:21

## 2020-04-28 NOTE — NUR
SS following up with discharge planning. SS, discharge planner, Eileen, and RN, Kassie, met 
with pt to discuss discharge planning. As observed, Rose, is nonverbal but is able to 
understand and is able to point. SS discussed discharge planning with Rose and notified Rose 
that at this time she is over income for Medicaid and will need to discharge to home with 
family when stable for discharge. Rose nodded in understanding. Discharge planner and SS 
provided Rose with a list of names of all of her family members and when asked who she 
wanted to live with she acknowledged that she wants her and her son to live with Beth. 
When asked who she wants to make decisions for her she acknowledged that she wants 
daughters, Glenna and Beth, to be 50% decision makers. She also acknowledged that she 
wants her boyfriend Rolf to be allowed to visit with her. SS left voicemail for Beth this 
morning to discuss discharge planning. SS and discharge planner will continue to reach out 
to Beth and communicate with Rose. Pt's RN present during visit and notified.

## 2020-04-28 NOTE — PDOC
PROGRESS NOTES


Chief Complaint


Chief Complaint


Acute hypoxic Respiratory failure requiring mechanical ventilation (on vent 

since 3/23)


Tracheostomy


bilateral pleural effusions/pulm edema 


Sepsis


Severe Acute gallstone pancreatitis (not a surgical candidate at this time) with

necrosis


Acute kidney failure now requiring dialysis


Salpingitis


Gallstones (Calculus of gallbladder with acute cholecystitis without 

obstruction)


HTN 


Leukocytosis 


Hypoxia


Uterine fibroid


Intractable pain


Intractable nausea


Covid 19 negative. 


Acute on chronic anemia 


EEG: No seizure activity


ESRD on HD


Hyperglycemia





History of Present Illness


History of Present Illness


Ms Tadeo is a 48yo F w/ PMHx HTN, prediabetes who presented to the emergency 

room with complaints of abdominal pain on 3/16/2020. Found with Lipase 30063, 

, , Bilirubin 1.4.


CT abdomen confirms pancreatic inflammation, peripancreatic fluid and 

inflammatory changes around the pancreas consistent with pancreatitis. 

Cholelithiasis and 1.4cm uterine fibroid as well as possible left salpingitis. 

Admitted for further care


GI, General surgery, ID, Pulm consulted.





3/17: PICC placed per IR. Renal US negative. Started on levophed. Repeat CT 

abdomen w/ necrosis; 3/18: Dialysis catheter per nephrology; 3/19: On BiPAP; 

3/20: BiPAP, dialysis; 3/21: Overnight Tmax 101.7 , still on BiPAP FiO2 40%, 

still on low dose Levophed gtt, TPN initiated. On dialysis


4/6: Tracheostomy; 4/12: S/p tracheostomy on vent spontaneous respirations with 

5 of pressure support 35% FiO2, rectal tube and a Chino, off pressors; 

4/14:Still on vent via trach. Removed PICC and CVC LIJ and replaced. CT 

chest/abd/pelvis with bilateral pleural effusion and ascites.


4/15: Renal function stable. Still on vent. More interactive today. Miming wish 

for food. Plan discussed for thoracentesis/paracentesis with daughters today. 

They were under impression patient was doing worse due to a miscommunication 

which has been clarified over the phone. 4.3L removed.


4/17: Febrile overnight 101.8F. More interactive, still on vent. Asking for ice 

by miming; 4/18: Afebrile overnight. TMax last 24 hours 100.6F. Hb 7.1. 

Interactive when awake. 4/22: Transfusion 1u PRBC (6U total since admit)


4/23-4/26: TPN and precedex, vent.


4/27: Tmax 101F overnight. Hb 8.2. HD cath out since 4/24. Alert. On vent SIMV 

35% FiO2. Surgery: ex-lap, no naif or pancreatic necrosectomy 2/2 profound 

inflammation.





Seen POD #1. Afebrile overnight. BUN 62. CBC WNL today.Remains on vent via 

trach, TPN. Able to point today and indicate she wishes her daughters and Rolf 

to be involved in her care.





Plan:


Cont vent weaning, dialysis - will need replacement catheter


Trach shield during day if ok with pulm. Would recommend ABG after 4 hours to 

r/o CO2 retention, however and still vent overnight





Vitals


Vitals





Vital Signs








  Date Time  Temp Pulse Resp B/P (MAP) Pulse Ox O2 Delivery O2 Flow Rate FiO2


 


4/28/20 07:42     98 Ventilator  


 


4/28/20 04:00 98.7 76 18 119/76 (90)    





 98.7       











Physical Exam


Physical Exam


GENERAL: Propped up in bed, sedated weak appearing 


HEENT: Pupils equal, + NGT, oral cavity dry 


NECK:  Trach/vent 


LUNGS: rhonchi 


HEART:  S1, S2, regular 


ABDOMEN: Distended, tender, hypoactive BS, drain placement (427 )


: Chino (4/14)


EXTREMITIES: Generalized edema, no cyanosis, SCDs bilaterally 


DERMATOLOGIC:  Warm and dry.  No generalized rash.  


CENTRAL NERVOUS SYSTEM: Extremely weak, nods to few simple questions 


HDC has been removed


LI (4/14) clean


General:  Alert, Cooperative, No acute distress


Heart:  Regular rate, Normal S1, Other (increased rate)


Lungs:  Crackles, Other (dimished in BLL  nc at perrl   nose clear   neck trach 

site ok   no lad  no thyromegaly)


Abdomen:  Soft, Other (mild TTP)


Extremities:  Other (ANASARCA)


Skin:  Other (mottling noted to extremities )





Labs


LABS





Laboratory Tests








Test


 4/27/20


13:40 4/27/20


18:04 4/28/20


00:57 4/28/20


07:00


 


Glucose (Fingerstick)


 163 mg/dL


(70-99) 208 mg/dL


(70-99) 208 mg/dL


(70-99) 





 


White Blood Count


 


 


 


 6.3 x10^3/uL


(4.0-11.0)


 


Red Blood Count


 


 


 


 4.12 x10^6/uL


(3.50-5.40)


 


Hemoglobin


 


 


 


 12.1 g/dL


(12.0-15.5)


 


Hematocrit


 


 


 


 37.5 %


(36.0-47.0)


 


Mean Corpuscular Volume    91 fL () 


 


Mean Corpuscular Hemoglobin    29 pg (25-35) 


 


Mean Corpuscular Hemoglobin


Concent 


 


 


 32 g/dL


(31-37)


 


Red Cell Distribution Width


 


 


 


 18.3 %


(11.5-14.5)


 


Platelet Count


 


 


 


 267 x10^3/uL


(140-400)


 


Neutrophils (%) (Auto)    74 % (31-73) 


 


Lymphocytes (%) (Auto)    20 % (24-48) 


 


Monocytes (%) (Auto)    5 % (0-9) 


 


Eosinophils (%) (Auto)    0 % (0-3) 


 


Basophils (%) (Auto)    0 % (0-3) 


 


Neutrophils # (Auto)


 


 


 


 4.7 x10^3/uL


(1.8-7.7)


 


Lymphocytes # (Auto)


 


 


 


 1.3 x10^3/uL


(1.0-4.8)


 


Monocytes # (Auto)


 


 


 


 0.3 x10^3/uL


(0.0-1.1)


 


Eosinophils # (Auto)


 


 


 


 0.0 x10^3/uL


(0.0-0.7)


 


Basophils # (Auto)


 


 


 


 0.0 x10^3/uL


(0.0-0.2)


 


Sodium Level


 


 


 


 150 mmol/L


(136-145)


 


Potassium Level


 


 


 


 3.9 mmol/L


(3.5-5.1)


 


Chloride Level


 


 


 


 111 mmol/L


()


 


Carbon Dioxide Level


 


 


 


 28 mmol/L


(21-32)


 


Anion Gap    11 (6-14) 


 


Blood Urea Nitrogen


 


 


 


 63 mg/dL


(7-20)


 


Creatinine


 


 


 


 0.9 mg/dL


(0.6-1.0)


 


Estimated GFR


(Cockcroft-Gault) 


 


 


 66.5 





 


Glucose Level


 


 


 


 162 mg/dL


(70-99)


 


Calcium Level


 


 


 


 8.8 mg/dL


(8.5-10.1)


 


Iron Level


 


 


 


 23 ug/dL


()


 


Total Iron Binding Capacity


 


 


 


 76 ug/dL


(250-450)


 


Iron Saturation    30 % (15-34) 











Assessment and Plan


Assessmemt and Plan


Problems


Medical Problems:


(1) Acute pancreatitis


Status: Acute  





(2) Cholelithiasis


Status: Acute  











Comment


Review of Relevant


I have reviewed the following items josy (where applicable) has been applied.


Labs





Laboratory Tests








Test


 4/26/20


12:09 4/26/20


17:28 4/27/20


00:41 4/27/20


04:30


 


Glucose (Fingerstick)


 148 mg/dL


(70-99) 144 mg/dL


(70-99) 134 mg/dL


(70-99) 





 


White Blood Count


 


 


 


 9.2 x10^3/uL


(4.0-11.0)


 


Red Blood Count


 


 


 


 2.72 x10^6/uL


(3.50-5.40)


 


Hemoglobin


 


 


 


 8.2 g/dL


(12.0-15.5)


 


Hematocrit


 


 


 


 24.6 %


(36.0-47.0)


 


Mean Corpuscular Volume    90 fL () 


 


Mean Corpuscular Hemoglobin    30 pg (25-35) 


 


Mean Corpuscular Hemoglobin


Concent 


 


 


 33 g/dL


(31-37)


 


Red Cell Distribution Width


 


 


 


 18.4 %


(11.5-14.5)


 


Platelet Count


 


 


 


 323 x10^3/uL


(140-400)


 


Neutrophils (%) (Auto)    78 % (31-73) 


 


Lymphocytes (%) (Auto)    15 % (24-48) 


 


Monocytes (%) (Auto)    6 % (0-9) 


 


Eosinophils (%) (Auto)    1 % (0-3) 


 


Basophils (%) (Auto)    0 % (0-3) 


 


Neutrophils # (Auto)


 


 


 


 7.1 x10^3/uL


(1.8-7.7)


 


Lymphocytes # (Auto)


 


 


 


 1.4 x10^3/uL


(1.0-4.8)


 


Monocytes # (Auto)


 


 


 


 0.5 x10^3/uL


(0.0-1.1)


 


Eosinophils # (Auto)


 


 


 


 0.1 x10^3/uL


(0.0-0.7)


 


Basophils # (Auto)


 


 


 


 0.0 x10^3/uL


(0.0-0.2)


 


Sodium Level


 


 


 


 149 mmol/L


(136-145)


 


Potassium Level


 


 


 


 3.6 mmol/L


(3.5-5.1)


 


Chloride Level


 


 


 


 111 mmol/L


()


 


Carbon Dioxide Level


 


 


 


 30 mmol/L


(21-32)


 


Anion Gap    8 (6-14) 


 


Blood Urea Nitrogen


 


 


 


 57 mg/dL


(7-20)


 


Creatinine


 


 


 


 1.1 mg/dL


(0.6-1.0)


 


Estimated GFR


(Cockcroft-Gault) 


 


 


 52.8 





 


Glucose Level


 


 


 


 123 mg/dL


(70-99)


 


Calcium Level


 


 


 


 8.5 mg/dL


(8.5-10.1)


 


Creatine Kinase


 


 


 


 24 U/L


()


 


Test


 4/27/20


13:40 4/27/20


18:04 4/28/20


00:57 4/28/20


07:00


 


Glucose (Fingerstick)


 163 mg/dL


(70-99) 208 mg/dL


(70-99) 208 mg/dL


(70-99) 





 


White Blood Count


 


 


 


 6.3 x10^3/uL


(4.0-11.0)


 


Red Blood Count


 


 


 


 4.12 x10^6/uL


(3.50-5.40)


 


Hemoglobin


 


 


 


 12.1 g/dL


(12.0-15.5)


 


Hematocrit


 


 


 


 37.5 %


(36.0-47.0)


 


Mean Corpuscular Volume    91 fL () 


 


Mean Corpuscular Hemoglobin    29 pg (25-35) 


 


Mean Corpuscular Hemoglobin


Concent 


 


 


 32 g/dL


(31-37)


 


Red Cell Distribution Width


 


 


 


 18.3 %


(11.5-14.5)


 


Platelet Count


 


 


 


 267 x10^3/uL


(140-400)


 


Neutrophils (%) (Auto)    74 % (31-73) 


 


Lymphocytes (%) (Auto)    20 % (24-48) 


 


Monocytes (%) (Auto)    5 % (0-9) 


 


Eosinophils (%) (Auto)    0 % (0-3) 


 


Basophils (%) (Auto)    0 % (0-3) 


 


Neutrophils # (Auto)


 


 


 


 4.7 x10^3/uL


(1.8-7.7)


 


Lymphocytes # (Auto)


 


 


 


 1.3 x10^3/uL


(1.0-4.8)


 


Monocytes # (Auto)


 


 


 


 0.3 x10^3/uL


(0.0-1.1)


 


Eosinophils # (Auto)


 


 


 


 0.0 x10^3/uL


(0.0-0.7)


 


Basophils # (Auto)


 


 


 


 0.0 x10^3/uL


(0.0-0.2)


 


Sodium Level


 


 


 


 150 mmol/L


(136-145)


 


Potassium Level


 


 


 


 3.9 mmol/L


(3.5-5.1)


 


Chloride Level


 


 


 


 111 mmol/L


()


 


Carbon Dioxide Level


 


 


 


 28 mmol/L


(21-32)


 


Anion Gap    11 (6-14) 


 


Blood Urea Nitrogen


 


 


 


 63 mg/dL


(7-20)


 


Creatinine


 


 


 


 0.9 mg/dL


(0.6-1.0)


 


Estimated GFR


(Cockcroft-Gault) 


 


 


 66.5 





 


Glucose Level


 


 


 


 162 mg/dL


(70-99)


 


Calcium Level


 


 


 


 8.8 mg/dL


(8.5-10.1)


 


Iron Level


 


 


 


 23 ug/dL


()


 


Total Iron Binding Capacity


 


 


 


 76 ug/dL


(250-450)


 


Iron Saturation    30 % (15-34) 








Laboratory Tests








Test


 4/27/20


13:40 4/27/20


18:04 4/28/20


00:57 4/28/20


07:00


 


Glucose (Fingerstick)


 163 mg/dL


(70-99) 208 mg/dL


(70-99) 208 mg/dL


(70-99) 





 


White Blood Count


 


 


 


 6.3 x10^3/uL


(4.0-11.0)


 


Red Blood Count


 


 


 


 4.12 x10^6/uL


(3.50-5.40)


 


Hemoglobin


 


 


 


 12.1 g/dL


(12.0-15.5)


 


Hematocrit


 


 


 


 37.5 %


(36.0-47.0)


 


Mean Corpuscular Volume    91 fL () 


 


Mean Corpuscular Hemoglobin    29 pg (25-35) 


 


Mean Corpuscular Hemoglobin


Concent 


 


 


 32 g/dL


(31-37)


 


Red Cell Distribution Width


 


 


 


 18.3 %


(11.5-14.5)


 


Platelet Count


 


 


 


 267 x10^3/uL


(140-400)


 


Neutrophils (%) (Auto)    74 % (31-73) 


 


Lymphocytes (%) (Auto)    20 % (24-48) 


 


Monocytes (%) (Auto)    5 % (0-9) 


 


Eosinophils (%) (Auto)    0 % (0-3) 


 


Basophils (%) (Auto)    0 % (0-3) 


 


Neutrophils # (Auto)


 


 


 


 4.7 x10^3/uL


(1.8-7.7)


 


Lymphocytes # (Auto)


 


 


 


 1.3 x10^3/uL


(1.0-4.8)


 


Monocytes # (Auto)


 


 


 


 0.3 x10^3/uL


(0.0-1.1)


 


Eosinophils # (Auto)


 


 


 


 0.0 x10^3/uL


(0.0-0.7)


 


Basophils # (Auto)


 


 


 


 0.0 x10^3/uL


(0.0-0.2)


 


Sodium Level


 


 


 


 150 mmol/L


(136-145)


 


Potassium Level


 


 


 


 3.9 mmol/L


(3.5-5.1)


 


Chloride Level


 


 


 


 111 mmol/L


()


 


Carbon Dioxide Level


 


 


 


 28 mmol/L


(21-32)


 


Anion Gap    11 (6-14) 


 


Blood Urea Nitrogen


 


 


 


 63 mg/dL


(7-20)


 


Creatinine


 


 


 


 0.9 mg/dL


(0.6-1.0)


 


Estimated GFR


(Cockcroft-Gault) 


 


 


 66.5 





 


Glucose Level


 


 


 


 162 mg/dL


(70-99)


 


Calcium Level


 


 


 


 8.8 mg/dL


(8.5-10.1)


 


Iron Level


 


 


 


 23 ug/dL


()


 


Total Iron Binding Capacity


 


 


 


 76 ug/dL


(250-450)


 


Iron Saturation    30 % (15-34) 








Microbiology


4/15/20 Aerobic and Anaerobic Culture - Final, Complete


          


4/15/20 Anaerobic Culture Result 1 ([facility]SON) - Final, Complete


          


4/15/20 Aerobic Culture - Final, Complete


          


4/15/20 Aerobic Culture Result 1 ([facility]SON) - Final, Complete


          


4/15/20 Gram Stain - Final, Complete


          


4/15/20 Gram Stain Result 1 ([facility]SON) - Final, Complete


          


4/15/20 Gram Stain Result 2 ([facility]SON) - Final, Complete


          


4/14/20 Blood Culture - Final, Complete


          NO GROWTH AFTER 5 DAYS


4/12/20 Urine Culture - Final, Complete


          


4/12/20 Urine Culture Result 1 ([facility]SON) - Final, Complete


Medications





Current Medications


Sodium Chloride 1,000 ml @  1,000 mls/hr Q1H IV  Last administered on 3/16/20at 

03:00;  Start 3/16/20 at 03:00;  Stop 3/16/20 at 03:59;  Status DC


Ondansetron HCl (Zofran) 4 mg 1X  ONCE IVP  Last administered on 3/16/20at 

03:27;  Start 3/16/20 at 03:00;  Stop 3/16/20 at 03:01;  Status DC


Morphine Sulfate (Morphine Sulfate) 4 mg 1X  ONCE IV ;  Start 3/16/20 at 03:00; 

Stop 3/16/20 at 03:01;  Status Cancel


Ketorolac Tromethamine (Toradol 30mg Vial) 30 mg 1X  ONCE IV  Last administered 

on 3/16/20at 02:54;  Start 3/16/20 at 03:00;  Stop 3/16/20 at 03:01;  Status DC


Fentanyl Citrate (Fentanyl 2ml Vial) 25 mcg 1X  ONCE IVP  Last administered on 

3/16/20at 03:23;  Start 3/16/20 at 03:30;  Stop 3/16/20 at 03:31;  Status DC


Fentanyl Citrate (Fentanyl 2ml Vial) 100 mcg STK-MED ONCE .ROUTE ;  Start 

3/16/20 at 03:18;  Stop 3/16/20 at 03:18;  Status DC


Iohexol (Omnipaque 350 Mg/ml) 90 ml 1X  ONCE IV  Last administered on 3/16/20at 

03:25;  Start 3/16/20 at 03:30;  Stop 3/16/20 at 03:31;  Status DC


Info (CONTRAST GIVEN -- Rx MONITORING) 1 each PRN DAILY  PRN MC SEE COMMENTS;  

Start 3/16/20 at 03:30;  Stop 3/18/20 at 03:29;  Status DC


Hydromorphone HCl (Dilaudid) 0.5 mg 1X  ONCE IV  Last administered on 3/16/20at 

03:55;  Start 3/16/20 at 04:30;  Stop 3/16/20 at 04:32;  Status DC


Ondansetron HCl (Zofran) 4 mg PRN Q8HRS  PRN IV NAUSEA/VOMITING 1ST CHOICE;  

Start 3/16/20 at 05:00;  Stop 3/16/20 at 09:27;  Status DC


Morphine Sulfate (Morphine Sulfate) 2 mg PRN Q2HR  PRN IV SEVERE PAIN 7-10 Last 

administered on 3/17/20at 12:26;  Start 3/16/20 at 05:00;  Stop 3/17/20 at 

14:15;  Status DC


Sodium Chloride 1,000 ml @  125 mls/hr Q8H IV  Last administered on 3/16/20at 

20:56;  Start 3/16/20 at 05:00;  Stop 3/17/20 at 04:59;  Status DC


Hydromorphone HCl (Dilaudid) 0.5 mg PRN Q3HRS  PRN IV SEVERE PAIN 7-10 Last 

administered on 3/17/20at 10:06;  Start 3/16/20 at 05:00;  Stop 3/17/20 at 12:0

1;  Status DC


Piperacillin Sod/ Tazobactam Sod 4.5 gm/Sodium Chloride 100 ml @  200 mls/hr 1X 

ONCE IV  Last administered on 3/16/20at 05:44;  Start 3/16/20 at 06:00;  Stop 

3/16/20 at 06:29;  Status DC


Ondansetron HCl (Zofran) 4 mg PRN Q4HRS  PRN IV NAUSEA/VOMITING 1ST CHOICE Last 

administered on 4/24/20at 11:01;  Start 3/16/20 at 09:30


Insulin Human Lispro (HumaLOG) 0-9 UNITS Q6HRS SQ  Last administered on 

4/28/20at 01:01;  Start 3/16/20 at 09:30


Dextrose (Dextrose 50%-Water Syringe) 12.5 gm PRN Q15MIN  PRN IV SEE COMMENTS;  

Start 3/16/20 at 09:30


Pantoprazole Sodium (PROTONIX VIAL for IV PUSH) 40 mg DAILYAC IVP  Last 

administered on 4/27/20at 08:08;  Start 3/16/20 at 11:30


Prochlorperazine Edisylate (Compazine) 10 mg PRN Q6HRS  PRN IV NAUSEA/VOMITING, 

2nd CHOICE Last administered on 4/27/20at 13:59;  Start 3/16/20 at 17:45


Atenolol (Tenormin) 100 mg DAILY PO ;  Start 3/17/20 at 09:00;  Stop 3/16/20 at 

20:08;  Status DC


Metoprolol Tartrate (Lopressor Vial) 2.5 mg Q6HRS IVP  Last administered on 

3/17/20at 05:51;  Start 3/16/20 at 20:15;  Stop 3/17/20 at 10:02;  Status DC


Metoprolol Tartrate (Lopressor Vial) 5 mg Q6HRS IVP  Last administered on 

3/26/20at 00:12;  Start 3/17/20 at 10:15;  Stop 3/28/20 at 08:48;  Status DC


Hydromorphone HCl (Dilaudid) 1 mg PRN Q3HRS  PRN IV SEVERE PAIN 7-10 Last 

administered on 3/23/20at 05:13;  Start 3/17/20 at 12:00;  Stop 3/31/20 at 

00:25;  Status DC


Lidocaine HCl (Buffered Lidocaine 1%) 3 ml STK-MED ONCE .ROUTE ;  Start 3/17/20 

at 12:55;  Stop 3/17/20 at 12:56;  Status DC


Albumin Human 500 ml @  125 mls/hr 1X  ONCE IV  Last administered on 3/17/20at 

14:33;  Start 3/17/20 at 14:30;  Stop 3/17/20 at 18:32;  Status DC


Norepinephrine Bitartrate 8 mg/ Dextrose 258 ml @  17.299 mls/ hr CONT  PRN IV 

PER PROTOCOL Last administered on 4/14/20at 12:48;  Start 3/17/20 at 15:30;  

Stop 4/17/20 at 09:19;  Status DC


Sodium Chloride 1,000 ml @  125 mls/hr Q8H IV  Last administered on 3/17/20at 

21:04;  Start 3/17/20 at 16:00;  Stop 3/18/20 at 02:42;  Status DC


Albumin Human 500 ml @  125 mls/hr PRN BID  PRN IV After every 2L NSS & BP < 

90mm Last administered on 4/1/20at 14:21;  Start 3/17/20 at 16:00


Iohexol (Omnipaque 300 Mg/ml) 60 ml 1X  ONCE IV  Last administered on 3/17/20at 

17:20;  Start 3/17/20 at 17:00;  Stop 3/17/20 at 17:01;  Status DC


Info (CONTRAST GIVEN -- Rx MONITORING) 1 each PRN DAILY  PRN MC SEE COMMENTS;  

Start 3/17/20 at 17:00;  Stop 3/19/20 at 16:59;  Status DC


Meropenem 1 gm/ Sodium Chloride 100 ml @  200 mls/hr Q8HRS IV  Last administered

on 3/18/20at 05:45;  Start 3/17/20 at 20:00;  Stop 3/18/20 at 08:48;  Status DC


Furosemide (Lasix) 40 mg 1X  ONCE IVP  Last administered on 3/17/20at 22:12;  

Start 3/17/20 at 22:30;  Stop 3/17/20 at 22:31;  Status DC


Calcium Chloride 1000 mg/Sodium Chloride 110 ml @  220 mls/hr 1X  ONCE IV  Last 

administered on 3/17/20at 22:11;  Start 3/17/20 at 22:30;  Stop 3/17/20 at 

22:59;  Status DC


Albuterol Sulfate (Ventolin Neb Soln) 2.5 mg 1X  ONCE NEB  Last administered on 

3/18/20at 00:56;  Start 3/17/20 at 22:30;  Stop 3/17/20 at 22:31;  Status DC


Insulin Human Regular (HumuLIN R VIAL) 5 unit 1X  ONCE IV  Last administered on 

3/17/20at 22:14;  Start 3/17/20 at 22:30;  Stop 3/17/20 at 22:31;  Status DC


Magnesium Sulfate 50 ml @ 25 mls/hr 1X  ONCE IV  Last administered on 3/18/20at 

02:57;  Start 3/18/20 at 03:00;  Stop 3/18/20 at 04:59;  Status DC


Calcium Gluconate 1000 mg/Sodium Chloride 110 ml @  220 mls/hr 1X  ONCE IV  Last

administered on 3/18/20at 02:46;  Start 3/18/20 at 03:00;  Stop 3/18/20 at 

03:29;  Status DC


Sodium Chloride 1,000 ml @  200 mls/hr Q5H IV  Last administered on 3/18/20at 

02:46;  Start 3/18/20 at 03:00;  Stop 3/18/20 at 10:21;  Status DC


Calcium Gluconate 1000 mg/Sodium Chloride 110 ml @  220 mls/hr 1X  ONCE IV  Last

administered on 3/18/20at 03:21;  Start 3/18/20 at 03:30;  Stop 3/18/20 at 

03:59;  Status DC


Sodium Bicarbonate 50 meq/Sodium Chloride 1,050 ml @  75 mls/hr Q14H IV  Last 

administered on 3/22/20at 21:10;  Start 3/18/20 at 07:30;  Stop 3/23/20 at 

10:28;  Status DC


Calcium Gluconate 2000 mg/Sodium Chloride 120 ml @  220 mls/hr 1X  ONCE IV  Last

administered on 3/18/20at 09:05;  Start 3/18/20 at 07:30;  Stop 3/18/20 at 

08:02;  Status DC


Lidocaine HCl (Xylocaine-Mpf 1% 2ml Vial) 2 ml STK-MED ONCE .ROUTE ;  Start 

3/18/20 at 08:47;  Stop 3/18/20 at 08:47;  Status DC


Meropenem 500 mg/ Sodium Chloride 50 ml @  100 mls/hr Q12HR IV  Last 

administered on 3/23/20at 21:01;  Start 3/18/20 at 18:00;  Stop 3/24/20 at 

07:58;  Status DC


Lidocaine HCl (Buffered Lidocaine 1%) 3 ml STK-MED ONCE .ROUTE ;  Start 3/18/20 

at 09:46;  Stop 3/18/20 at 09:46;  Status DC


Lidocaine HCl (Buffered Lidocaine 1%) 6 ml 1X  ONCE INJ  Last administered on 

3/18/20at 10:26;  Start 3/18/20 at 10:15;  Stop 3/18/20 at 10:16;  Status DC


Info (Tpn Per Pharmacy) 1 each PRN DAILY  PRN MC SEE COMMENTS Last administered 

on 4/27/20at 14:03;  Start 3/18/20 at 12:00


Sodium Chloride 1,000 ml @  1,000 mls/hr Q1H PRN IV hypotension;  Start 3/18/20 

at 12:07;  Stop 3/18/20 at 18:06;  Status DC


Diphenhydramine HCl (Benadryl) 25 mg 1X PRN  PRN IV ITCHING;  Start 3/18/20 at 

12:15;  Stop 3/19/20 at 12:14;  Status DC


Diphenhydramine HCl (Benadryl) 25 mg 1X PRN  PRN IV ITCHING;  Start 3/18/20 at 

12:15;  Stop 3/19/20 at 12:14;  Status DC


Sodium Chloride 1,000 ml @  400 mls/hr Q2H30M PRN IV PATENCY;  Start 3/18/20 at 

12:07;  Stop 3/19/20 at 00:06;  Status DC


Info (PHARMACY MONITORING -- do not chart) 1 each PRN DAILY  PRN MC SEE 

COMMENTS;  Start 3/18/20 at 12:15;  Stop 3/20/20 at 08:13;  Status DC


Sodium Chloride 90 meq/Calcium Gluconate 10 meq/ Multivitamins 10 ml/Chromium/ 

Copper/Manganese/ Seleni/Zn 1 ml/ Total Parenteral Nutrition/Amino 

Acids/Dextrose/ Fat Emulsion Intravenous 55.005 ml  @ 2.292 mls/hr TPN  CONT IV 

;  Start 3/18/20 at 22:00;  Stop 3/18/20 at 12:33;  Status DC


Info (Tpn Per Pharmacy) 1 each PRN DAILY  PRN MC SEE COMMENTS;  Start 3/18/20 at

12:30;  Status UNV


Sodium Chloride 90 meq/Calcium Gluconate 10 meq/ Multivitamins 10 ml/Chromium/ 

Copper/Manganese/ Seleni/Zn 0.5 ml/ Total Parenteral Nutrition/Amino 

Acids/Dextrose/ Fat Emulsion Intravenous 1,512 ml @  63 mls/hr TPN  CONT IV  

Last administered on 3/18/20at 22:06;  Start 3/18/20 at 22:00;  Stop 3/19/20 at 

21:59;  Status DC


Calcium Carbonate/ Glycine (Tums) 500 mg PRN AFTMEALHC  PRN PO INDIGESTION;  

Start 3/18/20 at 17:45


Calcium Gluconate (Calcium Gluconate) 2,000 mg 1X  ONCE IVP  Last administered 

on 3/19/20at 02:19;  Start 3/19/20 at 02:15;  Stop 3/19/20 at 02:16;  Status DC


Calcium Chloride 3000 mg/Sodium Chloride 1,030 ml @  50 mls/hr [id number] IV  Last 

administered on 3/21/20at 02:17;  Start 3/19/20 at 08:00;  Stop 3/21/20 at 

15:23;  Status DC


Lorazepam (Ativan Inj) 1 mg PRN Q4HRS  PRN IVP ANXIETY / AGITATION, 2nd choic 

Last administered on 4/17/20at 03:51;  Start 3/19/20 at 09:00;  Stop 4/17/20 at 

09:19;  Status DC


Sodium Chloride 1,000 ml @  1,000 mls/hr Q1H PRN IV hypotension;  Start 3/19/20 

at 08:56;  Stop 3/19/20 at 14:55;  Status DC


Albumin Human 200 ml @  200 mls/hr 1X PRN  PRN IV Hypotension;  Start 3/19/20 at

09:00;  Stop 3/19/20 at 14:59;  Status DC


Diphenhydramine HCl (Benadryl) 25 mg 1X PRN  PRN IV ITCHING;  Start 3/19/20 at 

09:00;  Stop 3/20/20 at 08:59;  Status DC


Diphenhydramine HCl (Benadryl) 25 mg 1X PRN  PRN IV ITCHING;  Start 3/19/20 at 

09:00;  Stop 3/20/20 at 08:59;  Status DC


Sodium Chloride 1,000 ml @  400 mls/hr Q2H30M PRN IV PATENCY;  Start 3/19/20 at 

08:56;  Stop 3/19/20 at 20:55;  Status DC


Info (PHARMACY MONITORING -- do not chart) 1 each PRN DAILY  PRN MC SEE 

COMMENTS;  Start 3/19/20 at 09:00;  Status UNV


Info (PHARMACY MONITORING -- do not chart) 1 each PRN DAILY  PRN MC SEE 

COMMENTS;  Start 3/19/20 at 09:00;  Stop 3/20/20 at 08:13;  Status DC


Digoxin (Lanoxin) 500 mcg 1X  ONCE IV  Last administered on 3/19/20at 10:04;  St

art 3/19/20 at 10:00;  Stop 3/19/20 at 10:01;  Status DC


Digoxin (Lanoxin) 125 mcg 1X  ONCE IV  Last administered on 3/19/20at 17:10;  

Start 3/19/20 at 18:00;  Stop 3/19/20 at 18:01;  Status DC


Magnesium Sulfate 100 ml @  25 mls/hr 1X  ONCE IV  Last administered on 

3/19/20at 12:48;  Start 3/19/20 at 13:00;  Stop 3/19/20 at 16:59;  Status DC


Sodium Chloride 90 meq/Magnesium Sulfate 10 meq/ Calcium Gluconate 20 meq/ 

Multivitamins 10 ml/Chromium/ Copper/Manganese/ Seleni/Zn 0.5 ml/ Total 

Parenteral Nutrition/Amino Acids/Dextrose/ Fat Emulsion Intravenous 1,512 ml @  

63 mls/hr TPN  CONT IV  Last administered on 3/19/20at 22:25;  Start 3/19/20 at 

22:00;  Stop 3/20/20 at 21:59;  Status DC


Sodium Chloride 1,000 ml @  1,000 mls/hr Q1H PRN IV hypotension;  Start 3/20/20 

at 08:05;  Stop 3/20/20 at 14:04;  Status DC


Albumin Human 200 ml @  200 mls/hr 1X  ONCE IV  Last administered on 3/20/20at 

08:57;  Start 3/20/20 at 08:15;  Stop 3/20/20 at 09:14;  Status DC


Diphenhydramine HCl (Benadryl) 25 mg 1X PRN  PRN IV ITCHING;  Start 3/20/20 at 

08:15;  Stop 3/21/20 at 08:14;  Status DC


Diphenhydramine HCl (Benadryl) 25 mg 1X PRN  PRN IV ITCHING;  Start 3/20/20 at 

08:15;  Stop 3/21/20 at 08:14;  Status DC


Sodium Chloride 1,000 ml @  400 mls/hr Q2H30M PRN IV PATENCY;  Start 3/20/20 at 

08:05;  Stop 3/20/20 at 20:04;  Status DC


Info (PHARMACY MONITORING -- do not chart) 1 each PRN DAILY  PRN MC SEE 

COMMENTS;  Start 3/20/20 at 08:15;  Stop 3/24/20 at 07:57;  Status DC


Sodium Chloride 90 meq/Potassium Chloride 15 meq/ Potassium Phosphate 10 mmol/ 

Magnesium Sulfate 10 meq/Calcium Gluconate 20 meq/ Multivitamins 10 ml/Chromium/

Copper/Manganese/ Seleni/Zn 0.5 ml/ Total Parenteral Nutrition/Amino 

Acids/Dextrose/ Fat Emulsion Intravenous 1,512 ml @  63 mls/hr TPN  CONT IV  

Last administered on 3/20/20at 21:01;  Start 3/20/20 at 22:00;  Stop 3/21/20 at 

21:59;  Status DC


Potassium Chloride/Water 100 ml @  100 mls/hr 1X  ONCE IV  Last administered on 

3/20/20at 14:09;  Start 3/20/20 at 14:00;  Stop 3/20/20 at 14:59;  Status DC


Benzocaine (Hurricaine One) 1 spray 1X  ONCE MM  Last administered on 3/20/20at 

16:38;  Start 3/20/20 at 14:30;  Stop 3/20/20 at 14:31;  Status DC


Lidocaine HCl (Glydo (Lidocaine) Jelly) 1 thomas 1X  ONCE MM  Last administered on 

3/20/20at 16:38;  Start 3/20/20 at 14:30;  Stop 3/20/20 at 14:31;  Status DC


Linezolid/Dextrose 300 ml @  300 mls/hr Q12HR IV  Last administered on 3/26/20at

21:04;  Start 3/20/20 at 20:00;  Stop 3/27/20 at 07:50;  Status DC


Acetaminophen (Tylenol) 650 mg PRN Q6HRS  PRN PO MILD PAIN / TEMP;  Start 3/21

/20 at 03:30;  Stop 3/21/20 at 03:36;  Status DC


Acetaminophen (Tylenol) 650 mg PRN Q6HRS  PRN PEG MILD PAIN / TEMP Last adminis

tered on 4/16/20at 19:56;  Start 3/21/20 at 03:36


Sodium Chloride 1,000 ml @  1,000 mls/hr Q1H PRN IV hypotension;  Start 3/21/20 

at 07:50;  Stop 3/21/20 at 13:49;  Status DC


Albumin Human 200 ml @  200 mls/hr 1X PRN  PRN IV Hypotension;  Start 3/21/20 at

08:00;  Stop 3/21/20 at 13:59;  Status DC


Sodium Chloride (Normal Saline Flush) 10 ml 1X PRN  PRN IV AP catheter pack;  

Start 3/21/20 at 08:00;  Stop 3/22/20 at 07:59;  Status DC


Sodium Chloride (Normal Saline Flush) 10 ml 1X PRN  PRN IV  catheter pack;  

Start 3/21/20 at 08:00;  Stop 3/22/20 at 07:59;  Status DC


Sodium Chloride 1,000 ml @  400 mls/hr Q2H30M PRN IV PATENCY;  Start 3/21/20 at 

07:50;  Stop 3/21/20 at 19:49;  Status DC


Info (PHARMACY MONITORING -- do not chart) 1 each PRN DAILY  PRN MC SEE CO

MMENTS;  Start 3/21/20 at 08:00;  Status UNV


Info (PHARMACY MONITORING -- do not chart) 1 each PRN DAILY  PRN MC SEE 

COMMENTS;  Start 3/21/20 at 08:00;  Stop 3/23/20 at 08:25;  Status DC


Sodium Chloride 90 meq/Potassium Chloride 15 meq/ Potassium Phosphate 10 mmol/ 

Magnesium Sulfate 10 meq/Calcium Gluconate 20 meq/ Multivitamins 10 ml/Chromium/

Copper/Manganese/ Seleni/Zn 0.5 ml/ Total Parenteral Nutrition/Amino 

Acids/Dextrose/ Fat Emulsion Intravenous 1,512 ml @  63 mls/hr TPN  CONT IV  

Last administered on 3/21/20at 20:57;  Start 3/21/20 at 22:00;  Stop 3/22/20 at 

21:59;  Status DC


Sodium Chloride 90 meq/Potassium Chloride 15 meq/ Potassium Phosphate 15 mmol/ 

Magnesium Sulfate 10 meq/Calcium Gluconate 20 meq/ Multivitamins 10 ml/Chromium/

Copper/Manganese/ Seleni/Zn 0.5 ml/ Total Parenteral Nutrition/Amino 

Acids/Dextrose/ Fat Emulsion Intravenous 1,512 ml @  63 mls/hr TPN  CONT IV ;  

Start 3/22/20 at 22:00;  Stop 3/22/20 at 14:16;  Status DC


Sodium Chloride 90 meq/Potassium Chloride 15 meq/ Potassium Phosphate 15 mmol/ 

Magnesium Sulfate 10 meq/Calcium Gluconate 20 meq/ Multivitamins 10 ml/Chromium/

Copper/Manganese/ Seleni/Zn 0.5 ml/ Total Parenteral Nutrition/Amino 

Acids/Dextrose/ Fat Emulsion Intravenous 1,200 ml @  50 mls/hr TPN  CONT IV ;  

Start 3/22/20 at 22:00;  Stop 3/22/20 at 14:17;  Status DC


Sodium Chloride 90 meq/Potassium Chloride 15 meq/ Potassium Phosphate 10 mmol/ 

Magnesium Sulfate 10 meq/Calcium Gluconate 20 meq/ Multivitamins 10 ml/Chromium/

Copper/Manganese/ Seleni/Zn 0.5 ml/ Total Parenteral Nutrition/Amino 

Acids/Dextrose/ Fat Emulsion Intravenous 1,200 ml @  50 mls/hr TPN  CONT IV  

Last administered on 3/22/20at 23:29;  Start 3/22/20 at 22:00;  Stop 3/23/20 at 

21:59;  Status DC


Sodium Chloride 1,000 ml @  1,000 mls/hr Q1H PRN IV hypotension;  Start 3/23/20 

at 07:28;  Stop 3/23/20 at 13:27;  Status DC


Albumin Human 200 ml @  200 mls/hr 1X  ONCE IV  Last administered on 3/23/20at 

08:51;  Start 3/23/20 at 07:30;  Stop 3/23/20 at 08:29;  Status DC


Diphenhydramine HCl (Benadryl) 25 mg 1X PRN  PRN IV ITCHING;  Start 3/23/20 at 

07:30;  Stop 3/24/20 at 07:29;  Status DC


Diphenhydramine HCl (Benadryl) 25 mg 1X PRN  PRN IV ITCHING;  Start 3/23/20 at 

07:30;  Stop 3/24/20 at 07:29;  Status DC


Sodium Chloride 1,000 ml @  400 mls/hr Q2H30M PRN IV PATENCY;  Start 3/23/20 at 

07:28;  Stop 3/23/20 at 19:27;  Status DC


Info (PHARMACY MONITORING -- do not chart) 1 each PRN DAILY  PRN MC SEE 

COMMENTS;  Start 3/23/20 at 07:30;  Stop 4/3/20 at 13:01;  Status DC


Metronidazole 100 ml @  100 mls/hr Q6HRS IV  Last administered on 4/8/20at 

06:26;  Start 3/23/20 at 08:30;  Stop 4/8/20 at 09:58;  Status DC


Micafungin Sodium 100 mg/Dextrose 100 ml @  100 mls/hr Q24H IV  Last 

administered on 4/27/20at 08:09;  Start 3/23/20 at 09:00


Propofol 0 ml @ As Directed STK-MED ONCE IV ;  Start 3/23/20 at 07:53;  Stop 

3/23/20 at 07:53;  Status DC


Etomidate (Amidate) 20 mg STK-MED ONCE IV ;  Start 3/23/20 at 07:53;  Stop 

3/23/20 at 07:54;  Status DC


Midazolam HCl (Versed) 5 mg STK-MED ONCE .ROUTE ;  Start 3/23/20 at 07:57;  Stop

3/23/20 at 07:57;  Status DC


Fentanyl Citrate 30 ml @ 0 mls/hr CONT  PRN IV SEE PROTOCOL Last administered on

4/17/20at 06:12;  Start 3/23/20 at 08:15;  Stop 4/17/20 at 09:19;  Status DC


Artificial Tears (Artificial Tears) 1 drop PRN Q1HR  PRN OU DRY EYE, 1st choice;

 Start 3/23/20 at 08:15


Midazolam HCl 50 mg/Sodium Chloride 50 ml @ 0 mls/hr CONT  PRN IV SEE PROTOCOL 

Last administered on 3/26/20at 22:39;  Start 3/23/20 at 08:15;  Stop 3/28/20 at 

15:59;  Status DC


Etomidate (Amidate) 8 mg 1X  ONCE IV  Last administered on 3/23/20at 08:33;  

Start 3/23/20 at 08:30;  Stop 3/23/20 at 08:31;  Status DC


Succinylcholine Chloride (Anectine) 120 mg 1X  ONCE IV  Last administered on 

3/23/20at 08:34;  Start 3/23/20 at 08:30;  Stop 3/23/20 at 08:31;  Status DC


Midazolam HCl (Versed) 5 mg 1X  ONCE IV ;  Start 3/23/20 at 08:30;  Stop 3/23/20

at 08:31;  Status DC


Potassium Chloride 15 meq/ Bicarbonate Dialysis Soln w/ out KCl 5,007.5 ml  @ 

1,000 mls/ hr Q5H1M IV  Last administered on 3/24/20at 11:11;  Start 3/23/20 at 

12:00;  Stop 3/24/20 at 11:15;  Status DC


Potassium Chloride 15 meq/ Bicarbonate Dialysis Soln w/ out KCl 5,007.5 ml  @ 

1,000 mls/ hr Q5H1M IV  Last administered on 3/24/20at 11:12;  Start 3/23/20 at 

12:00;  Stop 3/24/20 at 11:17;  Status DC


Potassium Chloride 15 meq/ Bicarbonate Dialysis Soln w/ out KCl 5,007.5 ml  @ 

1,000 mls/ hr Q5H1M IV  Last administered on 3/24/20at 11:11;  Start 3/23/20 at 

12:00;  Stop 3/24/20 at 11:19;  Status DC


Sodium Chloride 90 meq/Potassium Chloride 15 meq/ Potassium Phosphate 10 mmol/ 

Magnesium Sulfate 10 meq/Calcium Gluconate 20 meq/ Multivitamins 10 ml/Chromium/

Copper/Manganese/ Seleni/Zn 0.5 ml/ Total Parenteral Nutrition/Amino 

Acids/Dextrose/ Fat Emulsion Intravenous 1,400 ml @  58.333 mls/ hr TPN  CONT IV

 Last administered on 3/23/20at 21:42;  Start 3/23/20 at 22:00;  Stop 3/24/20 at

21:59;  Status DC


Heparin Sodium (Porcine) (Heparin Sodium) 5,000 unit Q8HRS SQ  Last administered

on 3/28/20at 05:55;  Start 3/23/20 at 15:00;  Stop 3/28/20 at 13:28;  Status DC


Meropenem 500 mg/ Sodium Chloride 50 ml @  100 mls/hr Q6HRS IV  Last 

administered on 3/25/20at 06:00;  Start 3/24/20 at 09:00;  Stop 3/25/20 at 

07:29;  Status DC


Potassium Phosphate 20 mmol/ Sodium Chloride 106.6667 ml @  51.667 m... 1X  ONCE

IV  Last administered on 3/24/20at 11:22;  Start 3/24/20 at 10:15;  Stop 3/24/20

at 12:18;  Status DC


Acetaminophen (Tylenol Supp) 650 mg PRN Q6HRS  PRN NE MILD PAIN / TEMP > 100.3'F

Last administered on 4/27/20at 00:32;  Start 3/24/20 at 10:30


Potassium Chloride/Water 100 ml @  100 mls/hr Q1H IV  Last administered on 

3/24/20at 12:12;  Start 3/24/20 at 11:00;  Stop 3/24/20 at 12:59;  Status DC


Potassium Chloride 20 meq/ Bicarbonate Dialysis Soln w/ out KCl 5,010 ml @  

1,000 mls/hr Q5H1M IV  Last administered on 3/25/20at 08:48;  Start 3/24/20 at 

12:00;  Stop 3/25/20 at 13:03;  Status DC


Potassium Chloride 20 meq/ Bicarbonate Dialysis Soln w/ out KCl 5,010 ml @  

1,000 mls/hr Q5H1M IV  Last administered on 3/29/20at 14:52;  Start 3/24/20 at 

11:30;  Stop 3/29/20 at 19:59;  Status DC


Potassium Chloride 20 meq/ Bicarbonate Dialysis Soln w/ out KCl 5,010 ml @  

1,000 mls/hr Q5H1M IV  Last administered on 3/29/20at 14:53;  Start 3/24/20 at 

11:30;  Stop 3/29/20 at 19:59;  Status DC


Sodium Chloride 90 meq/Potassium Chloride 15 meq/ Potassium Phosphate 15 mmol/ 

Magnesium Sulfate 10 meq/Calcium Gluconate 15 meq/ Multivitamins 10 ml/Chromium/

Copper/Manganese/ Seleni/Zn 0.5 ml/ Total Parenteral Nutrition/Amino 

Acids/Dextrose/ Fat Emulsion Intravenous 1,400 ml @  58.333 mls/ hr TPN  CONT IV

 Last administered on 3/24/20at 22:17;  Start 3/24/20 at 22:00;  Stop 3/25/20 at

21:59;  Status DC


Cefepime HCl (Maxipime) 2 gm Q12HR IVP  Last administered on 4/7/20at 20:56;  

Start 3/25/20 at 09:00;  Stop 4/8/20 at 09:58;  Status DC


Daptomycin 500 mg/ Sodium Chloride 50 ml @  100 mls/hr Q48H IV  Last 

administered on 4/10/20at 09:57;  Start 3/25/20 at 08:30;  Stop 4/10/20 at 

10:07;  Status DC


Lidocaine HCl (Buffered Lidocaine 1%) 3 ml 1X  ONCE INJ  Last administered on 

3/25/20at 10:27;  Start 3/25/20 at 10:30;  Stop 3/25/20 at 10:31;  Status DC


Potassium Phosphate 20 mmol/ Sodium Chloride 106.6667 ml @  51.667 m... 1X  ONCE

IV  Last administered on 3/25/20at 12:51;  Start 3/25/20 at 13:00;  Stop 3/25/20

at 15:03;  Status DC


Sodium Chloride 90 meq/Potassium Chloride 15 meq/ Potassium Phosphate 18 mmol/ 

Magnesium Sulfate 8 meq/Calcium Gluconate 15 meq/ Multivitamins 10 ml/Chromium/ 

Copper/Manganese/ Seleni/Zn 0.5 ml/ Total Parenteral Nutrition/Amino 

Acids/Dextrose/ Fat Emulsion Intravenous 1,400 ml @  58.333 mls/ hr TPN  CONT IV

 Last administered on 3/25/20at 22:16;  Start 3/25/20 at 22:00;  Stop 3/26/20 at

21:59;  Status DC


Potassium Chloride 20 meq/ Bicarbonate Dialysis Soln w/ out KCl 5,010 ml @  

1,000 mls/hr Q5H1M IV  Last administered on 3/29/20at 14:54;  Start 3/25/20 at 

16:00;  Stop 3/29/20 at 19:59;  Status DC


Multi-Ingred Cream/Lotion/Oil/ Oint (Artificial Tears Eye Ointment) 1 thomas PRN 

Q1HR  PRN OU DRY EYE, 2nd choice Last administered on 4/13/20at 08:19;  Start 

3/25/20 at 17:30


Sodium Chloride 90 meq/Potassium Chloride 15 meq/ Potassium Phosphate 18 mmol/ 

Magnesium Sulfate 8 meq/Calcium Gluconate 15 meq/ Multivitamins 10 ml/Chromium/ 

Copper/Manganese/ Seleni/Zn 0.5 ml/ Total Parenteral Nutrition/Amino 

Acids/Dextrose/ Fat Emulsion Intravenous 1,400 ml @  58.333 mls/ hr TPN  CONT IV

 Last administered on 3/26/20at 22:00;  Start 3/26/20 at 22:00;  Stop 3/27/20 at

21:59;  Status DC


Albumin Human 500 ml @  125 mls/hr 1X  ONCE IV ;  Start 3/26/20 at 14:15;  Stop 

3/26/20 at 18:14;  Status DC


Sodium Chloride 90 meq/Potassium Chloride 15 meq/ Potassium Phosphate 18 mmol/ 

Magnesium Sulfate 8 meq/Calcium Gluconate 15 meq/ Multivitamins 10 ml/Chromium/ 

Copper/Manganese/ Seleni/Zn 0.5 ml/ Insulin Human Regular 10 unit/ Total 

Parenteral Nutrition/Amino Acids/Dextrose/ Fat Emulsion Intravenous 1,400 ml @  

58.333 mls/ hr TPN  CONT IV  Last administered on 3/27/20at 21:43;  Start 

3/27/20 at 22:00;  Stop 3/28/20 at 21:59;  Status DC


Lidocaine HCl (Buffered Lidocaine 1%) 3 ml STK-MED ONCE .ROUTE ;  Start 3/25/20 

at 10:00;  Stop 3/27/20 at 13:57;  Status DC


Midazolam HCl 100 mg/Sodium Chloride 100 ml @ 7 mls/hr CONT  PRN IV SEE PROTOCOL

Last administered on 4/8/20at 15:35;  Start 3/28/20 at 16:00


Sodium Chloride 90 meq/Potassium Chloride 15 meq/ Potassium Phosphate 18 mmol/ 

Magnesium Sulfate 8 meq/Calcium Gluconate 15 meq/ Multivitamins 10 ml/Chromium/ 

Copper/Manganese/ Seleni/Zn 0.5 ml/ Insulin Human Regular 15 unit/ Total 

Parenteral Nutrition/Amino Acids/Dextrose/ Fat Emulsion Intravenous 1,400 ml @  

58.333 mls/ hr TPN  CONT IV  Last administered on 3/28/20at 20:34;  Start 

3/28/20 at 22:00;  Stop 3/29/20 at 21:59;  Status DC


Info ([facility] Electrolyte Protocol) 1 ea CONT PRN  PRN MC PER PROTOCOL;  Start 

3/29/20 at 13:15


Sodium Chloride 90 meq/Potassium Chloride 15 meq/ Potassium Phosphate 18 mmol/ 

Magnesium Sulfate 8 meq/Calcium Gluconate 15 meq/ Multivitamins 10 ml/Chromium/ 

Copper/Manganese/ Seleni/Zn 0.5 ml/ Insulin Human Regular 15 unit/ Total 

Parenteral Nutrition/Amino Acids/Dextrose/ Fat Emulsion Intravenous 1,400 ml @  

58.333 mls/ hr TPN  CONT IV  Last administered on 3/29/20at 22:05;  Start 

3/29/20 at 22:00;  Stop 3/30/20 at 21:59;  Status DC


Potassium Chloride 15 meq/ Bicarbonate Dialysis Soln w/ out KCl 5,007.5 ml  @ 

1,000 mls/ hr Q5H1M IV  Last administered on 4/1/20at 18:14;  Start 3/29/20 at 

20:00;  Stop 4/2/20 at 13:08;  Status DC


Potassium Chloride 15 meq/ Bicarbonate Dialysis Soln w/ out KCl 5,007.5 ml  @ 

1,000 mls/ hr Q5H1M IV  Last administered on 4/1/20at 18:14;  Start 3/29/20 at 

20:00;  Stop 4/2/20 at 13:08;  Status DC


Potassium Chloride 15 meq/ Bicarbonate Dialysis Soln w/ out KCl 5,007.5 ml  @ 

1,000 mls/ hr Q5H1M IV  Last administered on 4/1/20at 18:14;  Start 3/29/20 at 

20:00;  Stop 4/2/20 at 13:08;  Status DC


Iohexol (Omnipaque 240 Mg/ml) 30 ml 1X  ONCE PO  Last administered on 3/30/20at 

11:30;  Start 3/30/20 at 11:30;  Stop 3/30/20 at 11:33;  Status DC


Info (CONTRAST GIVEN -- Rx MONITORING) 1 each PRN DAILY  PRN MC SEE COMMENTS;  

Start 3/30/20 at 11:45;  Stop 4/1/20 at 11:44;  Status DC


Sodium Chloride 90 meq/Potassium Chloride 15 meq/ Potassium Phosphate 18 mmol/ 

Magnesium Sulfate 8 meq/Calcium Gluconate 15 meq/ Multivitamins 10 ml/Chromium/ 

Copper/Manganese/ Seleni/Zn 0.5 ml/ Insulin Human Regular 15 unit/ Total 

Parenteral Nutrition/Amino Acids/Dextrose/ Fat Emulsion Intravenous 1,400 ml @  

58.333 mls/ hr TPN  CONT IV  Last administered on 3/30/20at 21:47;  Start 

3/30/20 at 22:00;  Stop 3/31/20 at 21:59;  Status DC


Sodium Chloride 90 meq/Potassium Chloride 15 meq/ Potassium Phosphate 18 mmol/ 

Magnesium Sulfate 8 meq/Calcium Gluconate 15 meq/ Multivitamins 10 ml/Chromium/ 

Copper/Manganese/ Seleni/Zn 0.5 ml/ Insulin Human Regular 20 unit/ Total 

Parenteral Nutrition/Amino Acids/Dextrose/ Fat Emulsion Intravenous 1,400 ml @  

58.333 mls/ hr TPN  CONT IV  Last administered on 3/31/20at 21:36;  Start 

3/31/20 at 22:00;  Stop 4/1/20 at 21:59;  Status DC


Alteplase, Recombinant (Cathflo For Central Catheter Clearance) 1 mg 1X  ONCE 

INT CAT  Last administered on 3/31/20at 20:03;  Start 3/31/20 at 19:30;  Stop 

3/31/20 at 19:46;  Status DC


Alteplase, Recombinant (Cathflo For Central Catheter Clearance) 1 mg 1X  ONCE 

INT CAT  Last administered on 3/31/20at 22:05;  Start 3/31/20 at 22:00;  Stop 

3/31/20 at 22:01;  Status DC


Sodium Chloride 90 meq/Potassium Chloride 15 meq/ Potassium Phosphate 18 mmol/ 

Magnesium Sulfate 8 meq/Calcium Gluconate 15 meq/ Multivitamins 10 ml/Chromium/ 

Copper/Manganese/ Seleni/Zn 0.5 ml/ Insulin Human Regular 20 unit/ Total 

Parenteral Nutrition/Amino Acids/Dextrose/ Fat Emulsion Intravenous 1,400 ml @  

58.333 mls/ hr TPN  CONT IV  Last administered on 4/1/20at 21:30;  Start 4/1/20 

at 22:00;  Stop 4/2/20 at 21:59;  Status DC


Dexmedetomidine HCl 400 mcg/ Sodium Chloride 100 ml @ 0 mls/hr CONT  PRN IV 

ANXIETY / AGITATION Last administered on 4/28/20at 05:27;  Start 4/2/20 at 08:15


Sodium Chloride 500 ml @  500 mls/hr 1X PRN  PRN IV ELEVATED BP, SEE COMMENTS;  

Start 4/2/20 at 08:15


Atropine Sulfate (ATROPINE 0.5mg SYRINGE) 0.5 mg PRN Q5MIN  PRN IV SEE COMMENTS;

 Start 4/2/20 at 08:15


Furosemide (Lasix) 20 mg 1X  ONCE IVP  Last administered on 4/2/20at 08:19;  

Start 4/2/20 at 08:15;  Stop 4/2/20 at 08:16;  Status DC


Lidocaine HCl (Buffered Lidocaine 1%) 3 ml STK-MED ONCE .ROUTE ;  Start 4/2/20 

at 08:39;  Stop 4/2/20 at 08:39;  Status DC


Lidocaine HCl (Buffered Lidocaine 1%) 6 ml 1X  ONCE INJ  Last administered on 

4/2/20at 09:05;  Start 4/2/20 at 09:00;  Stop 4/2/20 at 09:06;  Status DC


Sodium Chloride 90 meq/Potassium Chloride 15 meq/ Potassium Phosphate 18 mmol/ 

Magnesium Sulfate 8 meq/Calcium Gluconate 15 meq/ Multivitamins 10 ml/Chromium/ 

Copper/Manganese/ Seleni/Zn 0.5 ml/ Insulin Human Regular 20 unit/ Total 

Parenteral Nutrition/Amino Acids/Dextrose/ Fat Emulsion Intravenous 1,400 ml @  

58.333 mls/ hr TPN  CONT IV  Last administered on 4/2/20at 22:45;  Start 4/2/20 

at 22:00;  Stop 4/3/20 at 21:59;  Status DC


Sodium Chloride 1,000 ml @  1,000 mls/hr Q1H PRN IV hypotension;  Start 4/3/20 

at 07:30;  Stop 4/3/20 at 13:29;  Status DC


Albumin Human 200 ml @  200 mls/hr 1X PRN  PRN IV Hypotension Last administered 

on 4/3/20at 09:36;  Start 4/3/20 at 07:30;  Stop 4/3/20 at 13:29;  Status DC


Sodium Chloride (Normal Saline Flush) 10 ml 1X PRN  PRN IV AP catheter pack;  

Start 4/3/20 at 07:30;  Stop 4/3/20 at 21:29;  Status DC


Sodium Chloride (Normal Saline Flush) 10 ml 1X PRN  PRN IV  catheter pack;  

Start 4/3/20 at 07:30;  Stop 4/4/20 at 07:29;  Status DC


Sodium Chloride 1,000 ml @  400 mls/hr Q2H30M PRN IV PATENCY;  Start 4/3/20 at 

07:30;  Stop 4/3/20 at 19:29;  Status DC


Info (PHARMACY MONITORING -- do not chart) 1 each PRN DAILY  PRN MC SEE 

COMMENTS;  Start 4/3/20 at 07:30;  Stop 4/3/20 at 13:02;  Status DC


Info (PHARMACY MONITORING -- do not chart) 1 each PRN DAILY  PRN MC SEE 

COMMENTS;  Start 4/3/20 at 07:30;  Stop 4/5/20 at 12:45;  Status DC


Sodium Chloride 90 meq/Potassium Chloride 15 meq/ Potassium Phosphate 10 mmol/ 

Magnesium Sulfate 8 meq/Calcium Gluconate 15 meq/ Multivitamins 10 ml/Chromium/ 

Copper/Manganese/ Seleni/Zn 0.5 ml/ Insulin Human Regular 25 unit/ Total 

Parenteral Nutrition/Amino Acids/Dextrose/ Fat Emulsion Intravenous 1,400 ml @  

58.333 mls/ hr TPN  CONT IV  Last administered on 4/3/20at 22:19;  Start 4/3/20 

at 22:00;  Stop 4/4/20 at 21:59;  Status DC


Heparin Sodium (Porcine) (Heparin Sodium) 5,000 unit Q12HR SQ  Last administered

on 4/26/20at 08:59;  Start 4/3/20 at 21:00;  Stop 4/26/20 at 10:05;  Status DC


Ondansetron HCl (Zofran) 4 mg PRN Q6HRS  PRN IV NAUSEA/VOMITING;  Start 4/6/20 

at 07:00;  Stop 4/7/20 at 06:59;  Status DC


Fentanyl Citrate (Fentanyl 2ml Vial) 25 mcg PRN Q5MIN  PRN IV MILD PAIN 1-3;  

Start 4/6/20 at 07:00;  Stop 4/7/20 at 06:59;  Status DC


Fentanyl Citrate (Fentanyl 2ml Vial) 50 mcg PRN Q5MIN  PRN IV MODERATE TO SEVERE

PAIN;  Start 4/6/20 at 07:00;  Stop 4/7/20 at 06:59;  Status DC


Ringer's Solution 1,000 ml @  30 mls/hr Q24H IV ;  Start 4/6/20 at 07:00;  Stop 

4/6/20 at 18:59;  Status DC


Lidocaine HCl (Xylocaine-Mpf 1% 2ml Vial) 2 ml PRN 1X  PRN ID PRIOR TO IV START;

 Start 4/6/20 at 07:00;  Stop 4/7/20 at 06:59;  Status DC


Prochlorperazine Edisylate (Compazine) 5 mg PACU PRN  PRN IV NAUSEA, MRX1;  St

art 4/6/20 at 07:00;  Stop 4/7/20 at 06:59;  Status DC


Sodium Chloride 1,000 ml @  1,000 mls/hr Q1H PRN IV hypotension;  Start 4/4/20 

at 09:10;  Stop 4/4/20 at 15:09;  Status DC


Albumin Human 200 ml @  200 mls/hr 1X PRN  PRN IV Hypotension Last administered 

on 4/4/20at 10:10;  Start 4/4/20 at 09:15;  Stop 4/4/20 at 15:14;  Status DC


Sodium Chloride 1,000 ml @  400 mls/hr Q2H30M PRN IV PATENCY;  Start 4/4/20 at 

09:10;  Stop 4/4/20 at 21:09;  Status DC


Info (PHARMACY MONITORING -- do not chart) 1 each PRN DAILY  PRN MC SEE 

COMMENTS;  Start 4/4/20 at 09:15;  Stop 4/5/20 at 12:45;  Status DC


Info (PHARMACY MONITORING -- do not chart) 1 each PRN DAILY  PRN MC SEE 

COMMENTS;  Start 4/4/20 at 09:15;  Stop 4/5/20 at 12:45;  Status DC


Sodium Chloride 90 meq/Potassium Chloride 15 meq/ Potassium Phosphate 10 mmol/ 

Magnesium Sulfate 8 meq/Calcium Gluconate 15 meq/ Multivitamins 10 ml/Chromium/ 

Copper/Manganese/ Seleni/Zn 0.5 ml/ Insulin Human Regular 25 unit/ Total 

Parenteral Nutrition/Amino Acids/Dextrose/ Fat Emulsion Intravenous 1,400 ml @  

58.333 mls/ hr TPN  CONT IV  Last administered on 4/4/20at 22:10;  Start 4/4/20 

at 22:00;  Stop 4/5/20 at 21:59;  Status DC


Magnesium Sulfate 50 ml @ 25 mls/hr PRN DAILY  PRN IV for Mag < 1.7 on am labs 

Last administered on 4/20/20at 17:27;  Start 4/5/20 at 09:15


Sodium Chloride 90 meq/Potassium Chloride 15 meq/ Potassium Phosphate 10 mmol/ 

Magnesium Sulfate 8 meq/Calcium Gluconate 15 meq/ Multivitamins 10 ml/Chromium/ 

Copper/Manganese/ Seleni/Zn 0.5 ml/ Insulin Human Regular 25 unit/ Total 

Parenteral Nutrition/Amino Acids/Dextrose/ Fat Emulsion Intravenous 1,400 ml @  

58.333 mls/ hr TPN  CONT IV  Last administered on 4/5/20at 21:20;  Start 4/5/20 

at 22:00;  Stop 4/6/20 at 21:59;  Status DC


Sodium Chloride 1,000 ml @  1,000 mls/hr Q1H PRN IV hypotension;  Start 4/5/20 

at 12:23;  Stop 4/5/20 at 18:22;  Status DC


Albumin Human 200 ml @  200 mls/hr 1X  ONCE IV  Last administered on 4/5/20at 

13:34;  Start 4/5/20 at 12:30;  Stop 4/5/20 at 13:29;  Status DC


Diphenhydramine HCl (Benadryl) 25 mg 1X PRN  PRN IV ITCHING;  Start 4/5/20 at 

12:30;  Stop 4/6/20 at 12:29;  Status DC


Diphenhydramine HCl (Benadryl) 25 mg 1X PRN  PRN IV ITCHING;  Start 4/5/20 at 

12:30;  Stop 4/6/20 at 12:29;  Status DC


Info (PHARMACY MONITORING -- do not chart) 1 each PRN DAILY  PRN MC SEE 

COMMENTS;  Start 4/5/20 at 12:30;  Status Cancel


Bupivacaine HCl/ Epinephrine Bitart (Sensorcain-Epi 0.5%-1:159157 Mpf) 30 ml 

STK-MED ONCE .ROUTE  Last administered on 4/6/20at 11:44;  Start 4/6/20 at 

11:00;  Stop 4/6/20 at 11:01;  Status DC


Cellulose (Surgicel Fibrillar 1x2) 1 each STK-MED ONCE .ROUTE ;  Start 4/6/20 at

11:00;  Stop 4/6/20 at 11:01;  Status DC


Sodium Chloride 90 meq/Potassium Chloride 15 meq/ Potassium Phosphate 10 mmol/ 

Magnesium Sulfate 12 meq/Calcium Gluconate 15 meq/ Multivitamins 10 ml/Chromium/

Copper/Manganese/ Seleni/Zn 0.5 ml/ Insulin Human Regular 25 unit/ Total 

Parenteral Nutrition/Amino Acids/Dextrose/ Fat Emulsion Intravenous 1,400 ml @  

58.333 mls/ hr TPN  CONT IV  Last administered on 4/6/20at 22:24;  Start 4/6/20 

at 22:00;  Stop 4/7/20 at 21:59;  Status DC


Propofol 20 ml @ As Directed STK-MED ONCE IV ;  Start 4/6/20 at 11:07;  Stop 

4/6/20 at 11:07;  Status DC


Cellulose (Surgicel Hemostat 4x8) 1 each STK-MED ONCE .ROUTE  Last administered 

on 4/6/20at 11:44;  Start 4/6/20 at 11:55;  Stop 4/6/20 at 11:56;  Status DC


Sevoflurane (Ultane) 60 ml STK-MED ONCE IH ;  Start 4/6/20 at 12:46;  Stop 

4/6/20 at 12:46;  Status DC


Sodium Chloride 1,000 ml @  1,000 mls/hr Q1H PRN IV hypotension;  Start 4/6/20 

at 13:51;  Stop 4/6/20 at 19:50;  Status DC


Albumin Human 200 ml @  200 mls/hr 1X PRN  PRN IV Hypotension Last administered 

on 4/6/20at 14:51;  Start 4/6/20 at 14:00;  Stop 4/6/20 at 19:59;  Status DC


Diphenhydramine HCl (Benadryl) 25 mg 1X PRN  PRN IV ITCHING;  Start 4/6/20 at 

14:00;  Stop 4/7/20 at 13:59;  Status DC


Diphenhydramine HCl (Benadryl) 25 mg 1X PRN  PRN IV ITCHING;  Start 4/6/20 at 14

:00;  Stop 4/7/20 at 13:59;  Status DC


Sodium Chloride 1,000 ml @  400 mls/hr Q2H30M PRN IV PATENCY;  Start 4/6/20 at 

13:51;  Stop 4/7/20 at 01:50;  Status DC


Info (PHARMACY MONITORING -- do not chart) 1 each PRN DAILY  PRN MC SEE 

COMMENTS;  Start 4/6/20 at 14:00;  Stop 4/9/20 at 08:16;  Status DC


Heparin Sodium (Porcine) (Hep Lock Adult) 500 unit STK-MED ONCE IVP ;  Start 

4/7/20 at 09:29;  Stop 4/7/20 at 09:30;  Status DC


Sodium Chloride 1,000 ml @  1,000 mls/hr Q1H PRN IV hypotension;  Start 4/7/20 

at 10:43;  Stop 4/7/20 at 16:42;  Status DC


Sodium Chloride 1,000 ml @  400 mls/hr Q2H30M PRN IV PATENCY;  Start 4/7/20 at 

10:43;  Stop 4/7/20 at 22:42;  Status DC


Info (PHARMACY MONITORING -- do not chart) 1 each PRN DAILY  PRN MC SEE 

COMMENTS;  Start 4/7/20 at 10:45;  Status UNV


Info (PHARMACY MONITORING -- do not chart) 1 each PRN DAILY  PRN MC SEE 

COMMENTS;  Start 4/7/20 at 10:45;  Status UNV


Sodium Chloride 90 meq/Potassium Chloride 15 meq/ Magnesium Sulfate 12 

meq/Calcium Gluconate 15 meq/ Multivitamins 10 ml/Chromium/ Copper/Manganese/ 

Seleni/Zn 0.5 ml/ Insulin Human Regular 25 unit/ Total Parenteral 

Nutrition/Amino Acids/Dextrose/ Fat Emulsion Intravenous 1,400 ml @  58.333 mls/

hr TPN  CONT IV  Last administered on 4/7/20at 22:13;  Start 4/7/20 at 22:00;  

Stop 4/8/20 at 21:59;  Status DC


Sodium Chloride 1,000 ml @  1,000 mls/hr Q1H PRN IV hypotension;  Start 4/8/20 

at 07:50;  Stop 4/8/20 at 13:49;  Status DC


Albumin Human 200 ml @  200 mls/hr 1X  ONCE IV ;  Start 4/8/20 at 08:00;  Stop 

4/8/20 at 08:53;  Status DC


Diphenhydramine HCl (Benadryl) 25 mg 1X PRN  PRN IV ITCHING;  Start 4/8/20 at 

08:00;  Stop 4/9/20 at 07:59;  Status DC


Diphenhydramine HCl (Benadryl) 25 mg 1X PRN  PRN IV ITCHING;  Start 4/8/20 at 

08:00;  Stop 4/9/20 at 07:59;  Status DC


Info (PHARMACY MONITORING -- do not chart) 1 each PRN DAILY  PRN MC SEE 

COMMENTS;  Start 4/8/20 at 08:00;  Stop 4/9/20 at 08:16;  Status DC


Albumin Human 50 ml @ 50 mls/hr 1X  ONCE IV ;  Start 4/8/20 at 08:53;  Stop 

4/8/20 at 08:56;  Status DC


Albumin Human 200 ml @  50 mls/hr PRN 1X  PRN IV HYPOTENSION Last administered 

on 4/14/20at 11:54;  Start 4/8/20 at 09:00


Meropenem 500 mg/ Sodium Chloride 50 ml @  100 mls/hr Q12H IV  Last administered

on 4/27/20at 22:00;  Start 4/8/20 at 10:00


Sodium Chloride 90 meq/Magnesium Sulfate 12 meq/ Calcium Gluconate 15 meq/ 

Multivitamins 10 ml/Chromium/ Copper/Manganese/ Seleni/Zn 0.5 ml/ Insulin Human 

Regular 25 unit/ Total Parenteral Nutrition/Amino Acids/Dextrose/ Fat Emulsion 

Intravenous 1,400 ml @  58.333 mls/ hr TPN  CONT IV  Last administered on 

4/8/20at 21:41;  Start 4/8/20 at 22:00;  Stop 4/9/20 at 21:59;  Status DC


Sodium Chloride 1,000 ml @  1,000 mls/hr Q1H PRN IV hypotension;  Start 4/9/20 

at 07:58;  Stop 4/9/20 at 13:57;  Status DC


Albumin Human 200 ml @  200 mls/hr 1X PRN  PRN IV Hypotension Last administered 

on 4/9/20at 09:30;  Start 4/9/20 at 08:00;  Stop 4/9/20 at 13:59;  Status DC


Sodium Chloride 1,000 ml @  400 mls/hr Q2H30M PRN IV PATENCY;  Start 4/9/20 at 

07:58;  Stop 4/9/20 at 19:57;  Status DC


Info (PHARMACY MONITORING -- do not chart) 1 each PRN DAILY  PRN MC SEE 

COMMENTS;  Start 4/9/20 at 08:00;  Status Cancel


Info (PHARMACY MONITORING -- do not chart) 1 each PRN DAILY  PRN MC SEE 

COMMENTS;  Start 4/9/20 at 08:15;  Status UNV


Sodium Chloride 90 meq/Potassium Phosphate 5 mmol/ Magnesium Sulfate 12 

meq/Calcium Gluconate 15 meq/ Multivitamins 10 ml/Chromium/ Copper/Manganese/ 

Seleni/Zn 0.5 ml/ Insulin Human Regular 30 unit/ Total Parenteral 

Nutrition/Amino Acids/Dextrose/ Fat Emulsion Intravenous 1,400 ml @  58.333 mls/

hr TPN  CONT IV  Last administered on 4/9/20at 22:08;  Start 4/9/20 at 22:00;  

Stop 4/10/20 at 21:59;  Status DC


Linezolid/Dextrose 300 ml @  300 mls/hr Q12HR IV  Last administered on 4/20/20at

20:40;  Start 4/10/20 at 11:00;  Stop 4/21/20 at 08:10;  Status DC


Sodium Chloride 90 meq/Potassium Phosphate 15 mmol/ Magnesium Sulfate 12 

meq/Calcium Gluconate 15 meq/ Multivitamins 10 ml/Chromium/ Copper/Manganese/ 

Seleni/Zn 0.5 ml/ Insulin Human Regular 30 unit/ Total Parenteral 

Nutrition/Amino Acids/Dextrose/ Fat Emulsion Intravenous 1,400 ml @  58.333 mls/

hr TPN  CONT IV  Last administered on 4/10/20at 21:49;  Start 4/10/20 at 22:00; 

Stop 4/11/20 at 21:59;  Status DC


Sodium Chloride 90 meq/Potassium Phosphate 15 mmol/ Magnesium Sulfate 12 

meq/Calcium Gluconate 15 meq/ Multivitamins 10 ml/Chromium/ Copper/Manganese/ 

Seleni/Zn 0.5 ml/ Insulin Human Regular 40 unit/ Total Parenteral 

Nutrition/Amino Acids/Dextrose/ Fat Emulsion Intravenous 1,400 ml @  58.333 mls/

hr TPN  CONT IV  Last administered on 4/11/20at 21:21;  Start 4/11/20 at 22:00; 

Stop 4/12/20 at 21:59;  Status DC


Sodium Chloride 1,000 ml @  1,000 mls/hr Q1H PRN IV hypotension;  Start 4/11/20 

at 13:26;  Stop 4/11/20 at 19:25;  Status DC


Albumin Human 200 ml @  200 mls/hr 1X PRN  PRN IV Hypotension Last administered 

on 4/11/20at 15:00;  Start 4/11/20 at 13:30;  Stop 4/11/20 at 19:29;  Status DC


Sodium Chloride (Normal Saline Flush) 10 ml 1X PRN  PRN IV AP catheter pack;  

Start 4/11/20 at 13:30;  Stop 4/12/20 at 13:29;  Status DC


Sodium Chloride (Normal Saline Flush) 10 ml 1X PRN  PRN IV  catheter pack;  

Start 4/11/20 at 13:30;  Stop 4/12/20 at 13:29;  Status DC


Sodium Chloride 1,000 ml @  400 mls/hr Q2H30M PRN IV PATENCY;  Start 4/11/20 at 

13:26;  Stop 4/12/20 at 01:25;  Status DC


Info (PHARMACY MONITORING -- do not chart) 1 each PRN DAILY  PRN MC SEE 

COMMENTS;  Start 4/11/20 at 13:30;  Stop 4/11/20 at 13:33;  Status DC


Info (PHARMACY MONITORING -- do not chart) 1 each PRN DAILY  PRN MC SEE 

COMMENTS;  Start 4/11/20 at 13:30;  Stop 4/11/20 at 13:34;  Status DC


Sodium Chloride 90 meq/Potassium Phosphate 19 mmol/ Magnesium Sulfate 12 

meq/Calcium Gluconate 15 meq/ Multivitamins 10 ml/Chromium/ Copper/Manganese/ 

Seleni/Zn 0.5 ml/ Insulin Human Regular 40 unit/ Total Parenteral 

Nutrition/Amino Acids/Dextrose/ Fat Emulsion Intravenous 1,400 ml @  58.333 mls/

hr TPN  CONT IV  Last administered on 4/12/20at 21:54;  Start 4/12/20 at 22:00; 

Stop 4/13/20 at 21:59;  Status DC


Sodium Chloride 1,000 ml @  1,000 mls/hr Q1H PRN IV hypotension;  Start 4/13/20 

at 09:35;  Stop 4/13/20 at 15:34;  Status DC


Albumin Human 200 ml @  200 mls/hr 1X PRN  PRN IV Hypotension;  Start 4/13/20 at

09:45;  Stop 4/13/20 at 15:44;  Status DC


Diphenhydramine HCl (Benadryl) 25 mg 1X PRN  PRN IV ITCHING;  Start 4/13/20 at 

09:45;  Stop 4/14/20 at 09:44;  Status DC


Diphenhydramine HCl (Benadryl) 25 mg 1X PRN  PRN IV ITCHING;  Start 4/13/20 at 

09:45;  Stop 4/14/20 at 09:44;  Status DC


Sodium Chloride 1,000 ml @  400 mls/hr Q2H30M PRN IV PATENCY;  Start 4/13/20 at 

09:35;  Stop 4/13/20 at 21:34;  Status DC


Info (PHARMACY MONITORING -- do not chart) 1 each PRN DAILY  PRN MC SEE 

COMMENTS;  Start 4/13/20 at 09:45;  Status Cancel


Sodium Chloride 100 meq/Potassium Phosphate 19 mmol/ Magnesium Sulfate 12 

meq/Calcium Gluconate 15 meq/ Multivitamins 10 ml/Chromium/ Copper/Manganese/ 

Seleni/Zn 0.5 ml/ Insulin Human Regular 40 unit/ Potassium Chloride 20 meq/ 

Total Parenteral Nutrition/Amino Acids/Dextrose/ Fat Emulsion Intravenous 1,400 

ml @  58.333 mls/ hr TPN  CONT IV  Last administered on 4/13/20at 22:02;  Start 

4/13/20 at 22:00;  Stop 4/14/20 at 21:59;  Status DC


Furosemide (Lasix) 40 mg 1X  ONCE IVP  Last administered on 4/13/20at 14:39;  

Start 4/13/20 at 14:30;  Stop 4/13/20 at 14:31;  Status DC


Metronidazole 100 ml @  100 mls/hr Q8HRS IV  Last administered on 4/21/20at 

06:04;  Start 4/14/20 at 10:00;  Stop 4/21/20 at 08:10;  Status DC


Sodium Chloride 1,000 ml @  1,000 mls/hr Q1H PRN IV hypotension;  Start 4/14/20 

at 08:00;  Stop 4/14/20 at 13:59;  Status DC


Albumin Human 200 ml @  200 mls/hr 1X PRN  PRN IV Hypotension;  Start 4/14/20 at

08:00;  Stop 4/14/20 at 13:59;  Status DC


Sodium Chloride 1,000 ml @  400 mls/hr Q2H30M PRN IV PATENCY;  Start 4/14/20 at 

08:00;  Stop 4/14/20 at 19:59;  Status DC


Info (PHARMACY MONITORING -- do not chart) 1 each PRN DAILY  PRN MC SEE 

COMMENTS;  Start 4/14/20 at 11:30;  Status UNV


Info (PHARMACY MONITORING -- do not chart) 1 each PRN DAILY  PRN MC SEE 

COMMENTS;  Start 4/14/20 at 11:30;  Stop 4/16/20 at 12:13;  Status DC


Sodium Chloride 100 meq/Potassium Phosphate 19 mmol/ Magnesium Sulfate 12 

meq/Calcium Gluconate 15 meq/ Multivitamins 10 ml/Chromium/ Copper/Manganese/ 

Seleni/Zn 0.5 ml/ Insulin Human Regular 40 unit/ Potassium Chloride 20 meq/ 

Total Parenteral Nutrition/Amino Acids/Dextrose/ Fat Emulsion Intravenous 1,400 

ml @  58.333 mls/ hr TPN  CONT IV  Last administered on 4/14/20at 21:52;  Start 

4/14/20 at 22:00;  Stop 4/15/20 at 21:59;  Status DC


Sodium Chloride (Normal Saline Flush) 10 ml QSHIFT  PRN IV AFTER MEDS AND BLOOD 

DRAWS;  Start 4/14/20 at 15:00


Sodium Chloride (Normal Saline Flush) 10 ml PRN Q5MIN  PRN IV AFTER MEDS AND 

BLOOD DRAWS;  Start 4/14/20 at 15:00


Sodium Chloride (Normal Saline Flush) 20 ml PRN Q5MIN  PRN IV AFTER MEDS AND 

BLOOD DRAWS;  Start 4/14/20 at 15:00


Sodium Chloride 100 meq/Potassium Phosphate 19 mmol/ Magnesium Sulfate 12 

meq/Calcium Gluconate 15 meq/ Multivitamins 10 ml/Chromium/ Copper/Manganese/ 

Seleni/Zn 0.5 ml/ Insulin Human Regular 40 unit/ Potassium Chloride 20 meq/ 

Total Parenteral Nutrition/Amino Acids/Dextrose/ Fat Emulsion Intravenous 1,400 

ml @  58.333 mls/ hr TPN  CONT IV  Last administered on 4/15/20at 21:20;  Start 

4/15/20 at 22:00;  Stop 4/16/20 at 21:59;  Status DC


Lidocaine HCl (Buffered Lidocaine 1%) 3 ml STK-MED ONCE .ROUTE ;  Start 4/15/20 

at 13:16;  Stop 4/15/20 at 13:16;  Status DC


Lidocaine HCl (Buffered Lidocaine 1%) 6 ml 1X  ONCE INJ  Last administered on 

4/15/20at 13:45;  Start 4/15/20 at 13:30;  Stop 4/15/20 at 13:31;  Status DC


Albumin Human 100 ml @  100 mls/hr 1X  ONCE IV  Last administered on 4/15/20at 

15:41;  Start 4/15/20 at 15:00;  Stop 4/15/20 at 15:59;  Status DC


Albumin Human 50 ml @ 50 mls/hr 1X  ONCE IV  Last administered on 4/15/20at 

15:00;  Start 4/15/20 at 15:00;  Stop 4/15/20 at 15:59;  Status DC


Info (PHARMACY MONITORING -- do not chart) 1 each PRN DAILY  PRN MC SEE 

COMMENTS;  Start 4/16/20 at 11:30;  Status Cancel


Info (PHARMACY MONITORING -- do not chart) 1 each PRN DAILY  PRN MC SEE 

COMMENTS;  Start 4/16/20 at 11:30;  Status UNV


Sodium Chloride 100 meq/Potassium Phosphate 10 mmol/ Magnesium Sulfate 12 

meq/Calcium Gluconate 15 meq/ Multivitamins 10 ml/Chromium/ Copper/Manganese/ 

Seleni/Zn 0.5 ml/ Insulin Human Regular 35 unit/ Potassium Chloride 20 meq/ 

Total Parenteral Nutrition/Amino Acids/Dextrose/ Fat Emulsion Intravenous 1,400 

ml @  58.333 mls/ hr TPN  CONT IV  Last administered on 4/16/20at 22:10;  Start 

4/16/20 at 22:00;  Stop 4/17/20 at 21:59;  Status DC


Sodium Chloride 100 meq/Potassium Phosphate 5 mmol/ Magnesium Sulfate 12 

meq/Calcium Gluconate 15 meq/ Multivitamins 10 ml/Chromium/ Copper/Manganese/ 

Seleni/Zn 0.5 ml/ Insulin Human Regular 35 unit/ Potassium Chloride 20 meq/ 

Total Parenteral Nutrition/Amino Acids/Dextrose/ Fat Emulsion Intravenous 1,400 

ml @  58.333 mls/ hr TPN  CONT IV  Last administered on 4/17/20at 22:59;  Start 

4/17/20 at 22:00;  Stop 4/18/20 at 21:59;  Status DC


Sodium Chloride 1,000 ml @  1,000 mls/hr Q1H PRN IV hypotension;  Start 4/18/20 

at 08:27;  Stop 4/18/20 at 14:26;  Status DC


Albumin Human 200 ml @  200 mls/hr 1X PRN  PRN IV Hypotension Last administered 

on 4/18/20at 09:18;  Start 4/18/20 at 08:30;  Stop 4/18/20 at 14:29;  Status DC


Sodium Chloride 1,000 ml @  400 mls/hr Q2H30M PRN IV PATENCY;  Start 4/18/20 at 

08:27;  Stop 4/18/20 at 20:26;  Status DC


Info (PHARMACY MONITORING -- do not chart) 1 each PRN DAILY  PRN MC SEE 

COMMENTS;  Start 4/18/20 at 08:30;  Status Cancel


Info (PHARMACY MONITORING -- do not chart) 1 each PRN DAILY  PRN MC SEE 

COMMENTS;  Start 4/18/20 at 08:30;  Stop 4/26/20 at 13:10;  Status DC


Sodium Chloride 100 meq/Potassium Chloride 40 meq/ Magnesium Sulfate 15 

meq/Calcium Gluconate 15 meq/ Multivitamins 10 ml/Chromium/ Copper/Manganese/ 

Seleni/Zn 0.5 ml/ Insulin Human Regular 35 unit/ Total Parenteral 

Nutrition/Amino Acids/Dextrose/ Fat Emulsion Intravenous 1,400 ml @  58.333 mls/

hr TPN  CONT IV  Last administered on 4/18/20at 22:00;  Start 4/18/20 at 22:00; 

Stop 4/19/20 at 21:59;  Status DC


Potassium Chloride/Water 100 ml @  100 mls/hr 1X  ONCE IV  Last administered on 

4/18/20at 17:28;  Start 4/18/20 at 14:45;  Stop 4/18/20 at 15:44;  Status DC


Sodium Chloride 100 meq/Potassium Chloride 40 meq/ Magnesium Sulfate 15 

meq/Calcium Gluconate 15 meq/ Multivitamins 10 ml/Chromium/ Copper/Manganese/ 

Seleni/Zn 0.5 ml/ Insulin Human Regular 35 unit/ Total Parenteral 

Nutrition/Amino Acids/Dextrose/ Fat Emulsion Intravenous 1,400 ml @  58.333 mls/

hr TPN  CONT IV  Last administered on 4/19/20at 22:46;  Start 4/19/20 at 22:00; 

Stop 4/20/20 at 21:59;  Status DC


Sodium Chloride 100 meq/Potassium Chloride 40 meq/ Magnesium Sulfate 20 

meq/Calcium Gluconate 15 meq/ Multivitamins 10 ml/Chromium/ Copper/Manganese/ 

Seleni/Zn 0.5 ml/ Insulin Human Regular 35 unit/ Total Parenteral Nutriti

on/Amino Acids/Dextrose/ Fat Emulsion Intravenous 1,400 ml @  58.333 mls/ hr TPN

 CONT IV  Last administered on 4/20/20at 22:31;  Start 4/20/20 at 22:00;  Stop 

4/21/20 at 21:59;  Status DC


Fentanyl Citrate (Fentanyl 2ml Vial) 50 mcg PRN Q2HR  PRN IVP PAIN Last 

administered on 4/27/20at 13:32;  Start 4/20/20 at 21:00


Fentanyl Citrate (Fentanyl 2ml Vial) 25 mcg PRN Q2HR  PRN IVP PAIN;  Start 

4/20/20 at 21:00


Enoxaparin Sodium (Lovenox 100mg Syringe) 100 mg Q12HR SQ ;  Start 4/21/20 at 

21:00;  Status UNV


Amino Acids/ Glycerin/ Electrolytes 1,000 ml @  75 mls/hr [id number] IV ;  Start 

4/20/20 at 21:15;  Status UNV


Sodium Chloride 1,000 ml @  1,000 mls/hr Q1H PRN IV hypotension;  Start 4/21/20 

at 07:56;  Stop 4/21/20 at 13:55;  Status DC


Albumin Human 200 ml @  200 mls/hr 1X PRN  PRN IV Hypotension Last administered 

on 4/21/20at 08:40;  Start 4/21/20 at 08:00;  Stop 4/21/20 at 13:59;  Status DC


Sodium Chloride 1,000 ml @  400 mls/hr Q2H30M PRN IV PATENCY;  Start 4/21/20 at 

07:56;  Stop 4/21/20 at 19:55;  Status DC


Info (PHARMACY MONITORING -- do not chart) 1 each PRN DAILY  PRN MC SEE 

COMMENTS;  Start 4/21/20 at 08:00;  Status UNV


Info (PHARMACY MONITORING -- do not chart) 1 each PRN DAILY  PRN MC SEE 

COMMENTS;  Start 4/21/20 at 08:00;  Status UNV


Daptomycin 430 mg/ Sodium Chloride 50 ml @  100 mls/hr Q24H IV  Last 

administered on 4/21/20at 12:35;  Start 4/21/20 at 09:00;  Stop 4/21/20 at 12:4

9;  Status DC


Sodium Chloride 100 meq/Potassium Chloride 40 meq/ Magnesium Sulfate 20 

meq/Calcium Gluconate 15 meq/ Multivitamins 10 ml/Chromium/ Copper/Manganese/ 

Seleni/Zn 0.5 ml/ Insulin Human Regular 35 unit/ Total Parenteral 

Nutrition/Amino Acids/Dextrose/ Fat Emulsion Intravenous 1,400 ml @  58.333 mls/

hr TPN  CONT IV  Last administered on 4/21/20at 21:26;  Start 4/21/20 at 22:00; 

Stop 4/22/20 at 21:59;  Status DC


Daptomycin 430 mg/ Sodium Chloride 50 ml @  100 mls/hr Q48H IV ;  Start 4/23/20 

at 09:00;  Stop 4/22/20 at 11:55;  Status DC


Sodium Chloride 100 meq/Potassium Chloride 40 meq/ Magnesium Sulfate 20 

meq/Calcium Gluconate 15 meq/ Multivitamins 10 ml/Chromium/ Copper/Manganese/ 

Seleni/Zn 0.5 ml/ Insulin Human Regular 35 unit/ Total Parenteral N

utrition/Amino Acids/Dextrose/ Fat Emulsion Intravenous 1,400 ml @  58.333 mls/ 

hr TPN  CONT IV  Last administered on 4/22/20at 22:27;  Start 4/22/20 at 22:00; 

Stop 4/23/20 at 21:59;  Status DC


Daptomycin 430 mg/ Sodium Chloride 50 ml @  100 mls/hr Q24H IV  Last 

administered on 4/24/20at 15:07;  Start 4/22/20 at 13:00;  Stop 4/25/20 at 

13:15;  Status DC


Sodium Chloride 100 meq/Potassium Chloride 40 meq/ Magnesium Sulfate 20 

meq/Calcium Gluconate 10 meq/ Multivitamins 10 ml/Chromium/ Copper/Manganese/ 

Seleni/Zn 0.5 ml/ Insulin Human Regular 35 unit/ Total Parenteral 

Nutrition/Amino Acids/Dextrose/ Fat Emulsion Intravenous 1,400 ml @  58.333 mls/

hr TPN  CONT IV  Last administered on 4/24/20at 00:06;  Start 4/23/20 at 22:00; 

Stop 4/24/20 at 21:59;  Status DC


Alteplase, Recombinant (Cathflo For Central Catheter Clearance) 1 mg 1X  ONCE 

INT CAT  Last administered on 4/24/20at 11:44;  Start 4/24/20 at 10:45;  Stop 

4/24/20 at 10:46;  Status DC


Ondansetron HCl (Zofran) 4 mg PRN Q6HRS  PRN IV NAUSEA/VOMITING;  Start 4/27/20 

at 07:00;  Stop 4/28/20 at 06:59;  Status DC


Fentanyl Citrate (Fentanyl 2ml Vial) 25 mcg PRN Q5MIN  PRN IV MILD PAIN 1-3;  

Start 4/27/20 at 07:00;  Stop 4/28/20 at 06:59;  Status DC


Fentanyl Citrate (Fentanyl 2ml Vial) 50 mcg PRN Q5MIN  PRN IV MODERATE TO SEVERE

PAIN Last administered on 4/27/20at 10:17;  Start 4/27/20 at 07:00;  Stop 

4/28/20 at 06:59;  Status DC


Ringer's Solution 1,000 ml @  30 mls/hr Q24H IV ;  Start 4/27/20 at 07:00;  Stop

4/27/20 at 18:59;  Status DC


Lidocaine HCl (Xylocaine-Mpf 1% 2ml Vial) 2 ml PRN 1X  PRN ID PRIOR TO IV START;

 Start 4/27/20 at 07:00;  Stop 4/28/20 at 06:59;  Status DC


Prochlorperazine Edisylate (Compazine) 5 mg PACU PRN  PRN IV NAUSEA, MRX1;  

Start 4/27/20 at 07:00;  Stop 4/28/20 at 06:59;  Status DC


Sodium Acetate 50 meq/Potassium Acetate 55 meq/ Magnesium Sulfate 20 meq/Calcium

Gluconate 10 meq/ Multivitamins 10 ml/Chromium/ Copper/Manganese/ Seleni/Zn 0.5 

ml/ Insulin Human Regular 35 unit/ Total Parenteral Nutrition/Amino 

Acids/Dextrose/ Fat Emulsion Intravenous 1,400 ml @  58.333 mls/ hr TPN  CONT IV

;  Start 4/24/20 at 22:00;  Stop 4/24/20 at 14:15;  Status DC


Sodium Acetate 50 meq/Potassium Acetate 55 meq/ Magnesium Sulfate 20 meq/Calcium

Gluconate 10 meq/ Multivitamins 10 ml/Chromium/ Copper/Manganese/ Seleni/Zn 0.5 

ml/ Insulin Human Regular 35 unit/ Total Parenteral Nutrition/Amino 

Acids/Dextrose/ Fat Emulsion Intravenous 1,800 ml @  75 mls/hr TPN  CONT IV  

Last administered on 4/24/20at 22:38;  Start 4/24/20 at 22:00;  Stop 4/25/20 at 

21:59;  Status DC


Sodium Chloride 1,000 ml @  1,000 mls/hr Q1H PRN IV hypotension;  Start 4/24/20 

at 15:31;  Stop 4/24/20 at 21:30;  Status DC


Diphenhydramine HCl (Benadryl) 25 mg 1X PRN  PRN IV ITCHING;  Start 4/24/20 at 

15:45;  Stop 4/25/20 at 15:44;  Status DC


Diphenhydramine HCl (Benadryl) 25 mg 1X PRN  PRN IV ITCHING;  Start 4/24/20 at 

15:45;  Stop 4/25/20 at 15:44;  Status DC


Sodium Chloride 1,000 ml @  400 mls/hr Q2H30M PRN IV PATENCY;  Start 4/24/20 at 

15:31;  Stop 4/25/20 at 03:30;  Status DC


Info (PHARMACY MONITORING -- do not chart) 1 each PRN DAILY  PRN MC SEE 

COMMENTS;  Start 4/24/20 at 15:45


Sodium Acetate 50 meq/Potassium Acetate 55 meq/ Magnesium Sulfate 20 meq/Calcium

Gluconate 10 meq/ Multivitamins 10 ml/Chromium/ Copper/Manganese/ Seleni/Zn 0.5 

ml/ Insulin Human Regular 35 unit/ Total Parenteral Nutrition/Amino 

Acids/Dextrose/ Fat Emulsion Intravenous 1,800 ml @  75 mls/hr TPN  CONT IV  

Last administered on 4/25/20at 22:03;  Start 4/25/20 at 22:00;  Stop 4/26/20 at 

21:59;  Status DC


Daptomycin 430 mg/ Sodium Chloride 50 ml @  100 mls/hr Q24H IV  Last 

administered on 4/27/20at 13:41;  Start 4/25/20 at 13:00


Heparin Sodium (Porcine) 1000 unit/Sodium Chloride 1,001 ml @  1,001 mls/hr 1X  

ONCE IRR ;  Start 4/27/20 at 06:00;  Stop 4/27/20 at 06:59;  Status DC


Potassium Acetate 55 meq/Magnesium Sulfate 20 meq/ Calcium Gluconate 10 meq/ 

Multivitamins 10 ml/Chromium/ Copper/Manganese/ Seleni/Zn 0.5 ml/ Insulin Human 

Regular 35 unit/ Total Parenteral Nutrition/Amino Acids/Dextrose/ Fat Emulsion 

Intravenous 1,920 ml @  80 mls/hr TPN  CONT IV  Last administered on 4/26/20at 

22:10;  Start 4/26/20 at 22:00;  Stop 4/27/20 at 21:59;  Status DC


Dexamethasone Sodium Phosphate (Decadron) 4 mg STK-MED ONCE .ROUTE ;  Start 

4/27/20 at 10:56;  Stop 4/27/20 at 10:57;  Status DC


Ondansetron HCl (Zofran) 4 mg STK-MED ONCE .ROUTE ;  Start 4/27/20 at 10:56;  

Stop 4/27/20 at 10:57;  Status DC


Rocuronium Bromide (Zemuron) 50 mg STK-MED ONCE .ROUTE ;  Start 4/27/20 at 

10:56;  Stop 4/27/20 at 10:57;  Status DC


Fentanyl Citrate (Fentanyl 2ml Vial) 100 mcg STK-MED ONCE .ROUTE ;  Start 

4/27/20 at 10:56;  Stop 4/27/20 at 10:57;  Status DC


Bupivacaine HCl/ Epinephrine Bitart (Sensorcain-Epi 0.5%-1:785450 Mpf) 30 ml 

STK-MED ONCE .ROUTE  Last administered on 4/27/20at 12:01;  Start 4/27/20 at 

10:58;  Stop 4/27/20 at 10:58;  Status DC


Cellulose (Surgicel Hemostat 2x14) 1 each STK-MED ONCE .ROUTE ;  Start 4/27/20 

at 10:58;  Stop 4/27/20 at 10:59;  Status DC


Iohexol (Omnipaque 300 Mg/ml) 50 ml STK-MED ONCE .ROUTE ;  Start 4/27/20 at 

10:58;  Stop 4/27/20 at 10:59;  Status DC


Cellulose (Surgicel Hemostat 4x8) 1 each STK-MED ONCE .ROUTE ;  Start 4/27/20 at

10:58;  Stop 4/27/20 at 10:59;  Status DC


Bisacodyl (Dulcolax Supp) 10 mg STK-MED ONCE .ROUTE ;  Start 4/27/20 at 10:59;  

Stop 4/27/20 at 10:59;  Status DC


Heparin Sodium (Porcine) 1000 unit/Sodium Chloride 1,001 ml @  1,001 mls/hr 1X  

ONCE IRR ;  Start 4/27/20 at 12:00;  Stop 4/27/20 at 12:59;  Status DC


Propofol 20 ml @ As Directed STK-MED ONCE IV ;  Start 4/27/20 at 11:05;  Stop 

4/27/20 at 11:05;  Status DC


Sevoflurane (Ultane) 90 ml STK-MED ONCE IH ;  Start 4/27/20 at 11:05;  Stop 

4/27/20 at 11:05;  Status DC


Sevoflurane (Ultane) 60 ml STK-MED ONCE IH ;  Start 4/27/20 at 12:26;  Stop 

4/27/20 at 12:27;  Status DC


Propofol 20 ml @ As Directed STK-MED ONCE IV ;  Start 4/27/20 at 12:26;  Stop 

4/27/20 at 12:27;  Status DC


Phenylephrine HCl (PHENYLEPHRINE in 0.9% NACL PF) 1 mg STK-MED ONCE IV ;  Start 

4/27/20 at 12:34;  Stop 4/27/20 at 12:34;  Status DC


Heparin Sodium (Porcine) (Heparin Sodium) 5,000 unit Q12HR SQ  Last administered

on 4/27/20at 22:02;  Start 4/27/20 at 21:00


Sodium Chloride (Normal Saline Flush) 3 ml QSHIFT  PRN IV AFTER MEDS AND BLOOD 

DRAWS;  Start 4/27/20 at 13:45


Naloxone HCl (Narcan) 0.4 mg PRN Q2MIN  PRN IV SEE INSTRUCTIONS;  Start 4/27/20 

at 13:45


Sodium Chloride 1,000 ml @  25 mls/hr Q24H IV  Last administered on 4/27/20at 

13:37;  Start 4/27/20 at 13:37


Naloxone HCl (Narcan) 0.4 mg PRN Q2MIN  PRN IV SEE INSTRUCTIONS;  Start 4/27/20 

at 14:30;  Status UNV


Sodium Chloride 1,000 ml @  25 mls/hr Q24H IV ;  Start 4/27/20 at 14:30;  Status

UNV


Hydromorphone HCl 30 ml @ 0 mls/hr CONT PRN  PRN IV PER PROTOCOL Last admini

stered on 4/27/20at 15:25;  Start 4/27/20 at 14:30


Potassium Acetate 55 meq/Magnesium Sulfate 20 meq/ Calcium Gluconate 10 meq/ 

Multivitamins 10 ml/Chromium/ Copper/Manganese/ Seleni/Zn 0.5 ml/ Insulin Human 

Regular 35 unit/ Total Parenteral Nutrition/Amino Acids/Dextrose/ Fat Emulsion 

Intravenous 1,920 ml @  80 mls/hr TPN  CONT IV  Last administered on 4/27/20at 

22:01;  Start 4/27/20 at 22:00;  Stop 4/28/20 at 21:59





Active Scripts


Active


Reported


Bisoprolol Fumarate 5 Mg Tablet 10 Mg PO DAILY


Vitals/I & O





Vital Sign - Last 24 Hours








 4/27/20 4/27/20 4/27/20 4/27/20





 09:00 09:40 10:00 10:17


 


Pulse 88  94 


 


Resp 23  27 


 


B/P (MAP) 120/73 (89)  151/82 (105) 


 


Pulse Ox 98 100 100 99


 


O2 Delivery Ventilator Ventilator Ventilator Ventilator





 4/27/20 4/27/20 4/27/20 4/27/20





 10:47 11:00 11:09 12:59


 


Pulse  76  


 


Resp  24  


 


B/P (MAP)  155/76 (102)  


 


Pulse Ox 99 99 99 93


 


O2 Delivery Ventilator Ventilator Ventilator Ventilator





 4/27/20 4/27/20 4/27/20 4/27/20





 13:00 13:00 13:32 14:00


 


Temp  98.6  





  98.6  


 


Pulse  103  100


 


Resp  17 21 19


 


B/P (MAP)  151/87 (108)  154/76 (102)


 


Pulse Ox  95 94 97


 


O2 Delivery Mechanical Ventilator Ventilator Ventilator Ventilator


 


    





    





 4/27/20 4/27/20 4/27/20 4/27/20





 15:00 15:25 15:48 16:00


 


Pulse 74   


 


Resp 18   


 


B/P (MAP) 97/60 (72)   


 


Pulse Ox 96 98 100 


 


O2 Delivery Ventilator Ventilator Ventilator Mechanical Ventilator





 4/27/20 4/27/20 4/27/20 4/27/20





 16:00 17:00 17:31 18:00


 


Temp 98.8   





 98.8   


 


Pulse 72 80  73


 


Resp 18 17  17


 


B/P (MAP) 114/66 (82) 153/86 (108)  107/58 (74)


 


Pulse Ox 97 97 98 97


 


O2 Delivery Ventilator Ventilator Ventilator Ventilator


 


    





    





 4/27/20 4/27/20 4/27/20 4/27/20





 19:00 20:00 20:00 20:21


 


Temp  97.9  





  97.9  


 


Pulse 76 76  


 


Resp 20 18  


 


B/P (MAP) 115/69 (84) 133/81 (98)  


 


Pulse Ox 98 97  97


 


O2 Delivery Ventilator Ventilator Mechanical Ventilator Ventilator


 


    





    





 4/27/20 4/27/20 4/27/20 4/27/20





 21:00 22:00 23:00 23:53


 


Pulse 82 72 72 


 


Resp 18 18 18 


 


B/P (MAP) 129/75 (93) 116/60 (78) 118/81 (93) 


 


Pulse Ox 96 97 98 98


 


O2 Delivery Ventilator Ventilator Ventilator Ventilator





 4/28/20 4/28/20 4/28/20 4/28/20





 00:00 00:00 01:00 02:00


 


Temp 98.5   





 98.5   


 


Pulse 70  70 70


 


Resp 18  21 19


 


B/P (MAP) 123/71 (88)  113/67 (82) 131/75 (93)


 


Pulse Ox 98  98 98


 


O2 Delivery Ventilator Mechanical Ventilator Ventilator Ventilator


 


    





    





 4/28/20 4/28/20 4/28/20 4/28/20





 03:00 03:51 04:00 04:00


 


Temp   98.7 





   98.7 


 


Pulse 68  76 


 


Resp 18  18 


 


B/P (MAP) 119/78 (92)  119/76 (90) 


 


Pulse Ox 98 98 98 


 


O2 Delivery Ventilator Ventilator Ventilator Mechanical Ventilator


 


    





    





 4/28/20   





 07:42   


 


Pulse Ox 98   


 


O2 Delivery Ventilator   














Intake and Output   


 


 4/27/20 4/27/20 4/28/20





 15:00 23:00 07:00


 


Intake Total 200 ml 2728 ml 542 ml


 


Output Total 985 ml 1175 ml 825 ml


 


Balance -785 ml 1553 ml -283 ml











Hemodynamically unstable?:  No


Is patient in severe pain?:  No


Is NPO status required?:  Yes











GAETANO GONCALVES MD        Apr 28, 2020 08:05

## 2020-04-28 NOTE — PDOC
Infectious Disease Note


Subjective:


Subjective


Patient alert awake


Still weak


No fevers last 24-hour


Remains on vent via trach, 


TPN





Vital Signs:


Vital Signs





Vital Signs








  Date Time  Temp Pulse Resp B/P (MAP) Pulse Ox O2 Delivery O2 Flow Rate FiO2


 


4/28/20 07:42     98 Ventilator  


 


4/28/20 06:00  72 18 131/76 (94)    


 


4/28/20 04:00 98.7       





 98.7       











Physical Exam:


PHYSICAL EXAM


GENERAL: Propped up in bed, sedated weak appearing 


HEENT: Pupils equal, + NGT, oral cavity dry 


NECK:  Trach/vent 


LUNGS: rhonchi 


HEART:  S1, S2, regular 


ABDOMEN: Distended, hypoactive BS, drain placement (4/27 )


: [place] (4/14)


EXTREMITIES: Generalized edema, no cyanosis, SCDs bilaterally 


DERMATOLOGIC:  Warm and dry.  No generalized rash.  


CENTRAL NERVOUS SYSTEM: Extremely weak, nods to few simple questions 


HDC has been removed


[facility] (4/14) clean





Medications:


Inpatient Meds:





Current Medications








 Medications


  (Trade)  Dose


 Ordered  Sig/Yee  Start Time


 Stop Time Status Last Admin


Dose Admin


 


 Acetaminophen


  (Tylenol Supp)  650 mg  PRN Q6HRS  PRN  3/24/20 10:30


    4/27/20 00:32


650 MG


 


 Acetaminophen


  (Tylenol)  650 mg  PRN Q6HRS  PRN  3/21/20 03:36


    4/16/20 19:56


650 MG


 


 Albumin Human  200 ml @ 


 200 mls/hr  1X PRN  PRN  4/21/20 08:00


 4/21/20 13:59 DC 4/21/20 08:40


200 MLS/HR


 


 Albuterol Sulfate


  (Ventolin Neb


 Soln)  2.5 mg  1X  ONCE  3/17/20 22:30


 3/17/20 22:31 DC 3/18/20 00:56


2.5 MG


 


 Alteplase,


 Recombinant


  (Cathflo For


 Central Catheter


 Clearance)  1 mg  1X  ONCE  4/24/20 10:45


 4/24/20 10:46 DC 4/24/20 11:44


1 MG


 


 Amino Acids/


 Glycerin/


 Electrolytes  1,000 ml @ 


 75 mls/hr  [id number]  4/20/20 21:15


   UNV  





 


 Artificial Tears


  (Artificial


 Tears)  1 drop  PRN Q1HR  PRN  3/23/20 08:15


     





 


 Atenolol


  (Tenormin)  100 mg  DAILY  3/17/20 09:00


 3/16/20 20:08 DC  





 


 Atropine Sulfate


  (ATROPINE 0.5mg


 SYRINGE)  0.5 mg  PRN Q5MIN  PRN  4/2/20 08:15


     





 


 Benzocaine


  (Hurricaine One)  1 spray  1X  ONCE  3/20/20 14:30


 3/20/20 14:31 DC 3/20/20 16:38


1 SPRAY


 


 Bisacodyl


  (Dulcolax Supp)  10 mg  STK-MED ONCE  4/27/20 10:59


 4/27/20 10:59 DC  





 


 Bupivacaine HCl/


 Epinephrine Bitart


  (Sensorcain-Epi


 0.5%-1:160166 Mpf)  30 ml  STK-MED ONCE  4/27/20 10:58


 4/27/20 10:58 DC 4/27/20 12:01


7 ML


 


 Calcium Carbonate/


 Glycine


  (Tums)  500 mg  PRN [id number]  PRN  3/18/20 17:45


     





 


 Calcium Chloride


 1000 mg/Sodium


 Chloride  110 ml @ 


 220 mls/hr  1X  ONCE  3/17/20 22:30


 3/17/20 22:59 DC 3/17/20 22:11


220 MLS/HR


 


 Calcium Chloride


 3000 mg/Sodium


 Chloride  1,030 ml @ 


 50 mls/hr  [id number]  3/19/20 08:00


 3/21/20 15:23 DC 3/21/20 02:17


50 MLS/HR


 


 Calcium Gluconate


  (Calcium


 Gluconate)  2,000 mg  1X  ONCE  3/19/20 02:15


 3/19/20 02:16 DC 3/19/20 02:19


2,000 MG


 


 Calcium Gluconate


 1000 mg/Sodium


 Chloride  110 ml @ 


 220 mls/hr  1X  ONCE  3/18/20 03:30


 3/18/20 03:59 DC 3/18/20 03:21


220 MLS/HR


 


 Calcium Gluconate


 2000 mg/Sodium


 Chloride  120 ml @ 


 220 mls/hr  1X  ONCE  3/18/20 07:30


 3/18/20 08:02 DC 3/18/20 09:05


220 MLS/HR


 


 Cefepime HCl


  (Maxipime)  2 gm  Q12HR  3/25/20 09:00


 4/8/20 09:58 DC 4/7/20 20:56


2 GM


 


 Cellulose


  (Surgicel


 Fibrillar 1x2)  1 each  STK-MED ONCE  4/6/20 11:00


 4/6/20 11:01 DC  





 


 Cellulose


  (Surgicel


 Hemostat 2x14)  1 each  STK-MED ONCE  4/27/20 10:58


 4/27/20 10:59 DC  





 


 Cellulose


  (Surgicel


 Hemostat 4x8)  1 each  STK-MED ONCE  4/27/20 10:58


 4/27/20 10:59 DC  





 


 Daptomycin 430 mg/


 Sodium Chloride  50 ml @ 


 100 mls/hr  Q24H  4/25/20 13:00


    4/27/20 13:41


100 MLS/HR


 


 Daptomycin 500 mg/


 Sodium Chloride  50 ml @ 


 100 mls/hr  Q48H  3/25/20 08:30


 4/10/20 10:07 DC 4/10/20 09:57


100 MLS/HR


 


 Dexamethasone


 Sodium Phosphate


  (Decadron)  4 mg  STK-MED ONCE  4/27/20 10:56


 4/27/20 10:57 DC  





 


 Dexmedetomidine


 HCl 400 mcg/


 Sodium Chloride  100 ml @ 0


 mls/hr  CONT  PRN  4/2/20 08:15


    4/28/20 08:47


33 MLS/HR


 


 Dextrose


  (Dextrose


 50%-Water Syringe)  12.5 gm  PRN Q15MIN  PRN  3/16/20 09:30


     





 


 Digoxin


  (Lanoxin)  125 mcg  1X  ONCE  3/19/20 18:00


 3/19/20 18:01 DC 3/19/20 17:10


125 MCG


 


 Diphenhydramine


 HCl


  (Benadryl)  25 mg  1X PRN  PRN  4/24/20 15:45


 4/25/20 15:44 DC  





 


 Enoxaparin Sodium


  (Lovenox 100mg


 Syringe)  100 mg  Q12HR  4/21/20 21:00


   UNV  





 


 Etomidate


  (Amidate)  8 mg  1X  ONCE  3/23/20 08:30


 3/23/20 08:31 DC 3/23/20 08:33


8 MG


 


 Fentanyl Citrate


  (Fentanyl 2ml


 Vial)  100 mcg  STK-MED ONCE  4/27/20 10:56


 4/27/20 10:57 DC  





 


 Furosemide


  (Lasix)  40 mg  1X  ONCE  4/13/20 14:30


 4/13/20 14:31 DC 4/13/20 14:39


40 MG


 


 Heparin Sodium


  (Porcine)


  (Hep Lock Adult)  500 unit  STK-MED ONCE  4/7/20 09:29


 4/7/20 09:30 DC  





 


 Heparin Sodium


  (Porcine)


  (Heparin Sodium)  5,000 unit  Q12HR  4/27/20 21:00


    4/28/20 08:45


5,000 UNIT


 


 Heparin Sodium


  (Porcine) 1000


 unit/Sodium


 Chloride  1,001 ml @ 


 1,001 mls/hr  1X  ONCE  4/27/20 12:00


 4/27/20 12:59 DC  





 


 Hydromorphone HCl  30 ml @ 0


 mls/hr  CONT PRN  PRN  4/27/20 14:30


    4/27/20 15:25


0 MLS/HR


 


 Hydromorphone HCl


  (Dilaudid)  1 mg  PRN Q3HRS  PRN  3/17/20 12:00


 3/31/20 00:25 DC 3/23/20 05:13


1 MG


 


 Info


  (CONTRAST GIVEN


 -- Rx MONITORING)  1 each  PRN DAILY  PRN  3/30/20 11:45


 4/1/20 11:44 DC  





 


 Info


  (Icu Electrolyte


 Protocol)  1 ea  CONT PRN  PRN  3/29/20 13:15


     





 


 Info


  (PHARMACY


 MONITORING -- do


 not chart)  1 each  PRN DAILY  PRN  4/24/20 15:45


     





 


 Info


  (Tpn Per


 Pharmacy)  1 each  PRN DAILY  PRN  3/18/20 12:30


   UNV  





 


 Insulin Human


 Lispro


  (HumaLOG)  0-9 UNITS  Q6HRS  3/16/20 09:30


    4/28/20 08:42


4 UNITS


 


 Insulin Human


 Regular


  (HumuLIN R VIAL)  5 unit  1X  ONCE  3/17/20 22:30


 3/17/20 22:31 DC 3/17/20 22:14


5 UNIT


 


 Iohexol


  (Omnipaque 240


 Mg/ml)  30 ml  1X  ONCE  3/30/20 11:30


 3/30/20 11:33 DC 3/30/20 11:30


30 ML


 


 Iohexol


  (Omnipaque 300


 Mg/ml)  50 ml  STK-MED ONCE  4/27/20 10:58


 4/27/20 10:59 DC  





 


 Iohexol


  (Omnipaque 350


 Mg/ml)  90 ml  1X  ONCE  3/16/20 03:30


 3/16/20 03:31 DC 3/16/20 03:25


90 ML


 


 Ketorolac


 Tromethamine


  (Toradol 30mg


 Vial)  30 mg  1X  ONCE  3/16/20 03:00


 3/16/20 03:01 DC 3/16/20 02:54


30 MG


 


 Lidocaine HCl


  (Buffered


 Lidocaine 1%)  6 ml  1X  ONCE  4/15/20 13:30


 4/15/20 13:31 DC 4/15/20 13:45


9 ML


 


 Lidocaine HCl


  (Glydo


  (Lidocaine) Jelly)  1 thomas  1X  ONCE  3/20/20 14:30


 3/20/20 14:31 DC 3/20/20 16:38


1 THOMAS


 


 Lidocaine HCl


  (Xylocaine-Mpf


 1% 2ml Vial)  2 ml  PRN 1X  PRN  4/27/20 07:00


 4/28/20 06:59 DC  





 


 Linezolid/Dextrose  300 ml @ 


 300 mls/hr  Q12HR  4/10/20 11:00


 4/21/20 08:10 DC 4/20/20 20:40


300 MLS/HR


 


 Lorazepam


  (Ativan Inj)  1 mg  PRN Q4HRS  PRN  3/19/20 09:00


 4/17/20 09:19 DC 4/17/20 03:51


1 MG


 


 Magnesium Sulfate  50 ml @ 25


 mls/hr  PRN DAILY  PRN  4/5/20 09:15


    4/20/20 17:27


25 MLS/HR


 


 Meropenem 1 gm/


 Sodium Chloride  100 ml @ 


 200 mls/hr  Q8HRS  3/17/20 20:00


 3/18/20 08:48 DC 3/18/20 05:45


200 MLS/HR


 


 Meropenem 500 mg/


 Sodium Chloride  50 ml @ 


 100 mls/hr  Q12H  4/8/20 10:00


    4/27/20 22:00


100 MLS/HR


 


 Metoprolol


 Tartrate


  (Lopressor Vial)  5 mg  Q6HRS  3/17/20 10:15


 3/28/20 08:48 DC 3/26/20 00:12


5 MG


 


 Metronidazole  100 ml @ 


 100 mls/hr  Q8HRS  4/14/20 10:00


 4/21/20 08:10 DC 4/21/20 06:04


100 MLS/HR


 


 Micafungin Sodium


 100 mg/Dextrose  100 ml @ 


 100 mls/hr  Q24H  3/23/20 09:00


    4/28/20 08:39


100 MLS/HR


 


 Midazolam HCl


  (Versed)  5 mg  1X  ONCE  3/23/20 08:30


 3/23/20 08:31 DC  





 


 Midazolam HCl 100


 mg/Sodium Chloride  100 ml @ 7


 mls/hr  CONT  PRN  3/28/20 16:00


    4/8/20 15:35


7 MLS/HR


 


 Midazolam HCl 50


 mg/Sodium Chloride  50 ml @ 0


 mls/hr  CONT  PRN  3/23/20 08:15


 3/28/20 15:59 DC 3/26/20 22:39


7 MLS/HR


 


 Morphine Sulfate


  (Morphine


 Sulfate)  2 mg  PRN Q2HR  PRN  3/16/20 05:00


 3/17/20 14:15 DC 3/17/20 12:26


2 MG


 


 Multi-Ingred


 Cream/Lotion/Oil/


 Oint


  (Artificial


 Tears Eye


 Ointment)  1 thomas  PRN Q1HR  PRN  3/25/20 17:30


    4/13/20 08:19


1 THOMAS


 


 Naloxone HCl


  (Narcan)  0.4 mg  PRN Q2MIN  PRN  4/27/20 14:30


   UNV  





 


 Norepinephrine


 Bitartrate 8 mg/


 Dextrose  258 ml @ 


 17.299 mls/


 hr  CONT  PRN  3/17/20 15:30


 4/17/20 09:19 DC 4/14/20 12:48


20.9 MLS/HR


 


 Ondansetron HCl


  (Zofran)  4 mg  STK-MED ONCE  4/27/20 10:56


 4/27/20 10:57 DC  





 


 Pantoprazole


 Sodium


  (PROTONIX VIAL


 for IV PUSH)  40 mg  DAILYAC  3/16/20 11:30


    4/28/20 08:39


40 MG


 


 Phenylephrine HCl


  (PHENYLEPHRINE


 in 0.9% NACL PF)  1 mg  STK-MED ONCE  4/27/20 12:34


 4/27/20 12:34 DC  





 


 Piperacillin Sod/


 Tazobactam Sod


 4.5 gm/Sodium


 Chloride  100 ml @ 


 200 mls/hr  1X  ONCE  3/16/20 06:00


 3/16/20 06:29 DC 3/16/20 05:44


200 MLS/HR


 


 Potassium


 Chloride 15 meq/


 Bicarbonate


 Dialysis Soln w/


 out KCl  5,007.5 ml


  @ 1,000 mls/


 hr  Q5H1M  3/29/20 20:00


 4/2/20 13:08 DC 4/1/20 18:14


1,000 MLS/HR


 


 Potassium


 Chloride 20 meq/


 Bicarbonate


 Dialysis Soln w/


 out KCl  5,010 ml @ 


 1,000 mls/hr  Q5H1M  3/25/20 16:00


 3/29/20 19:59 DC 3/29/20 14:54


1,000 MLS/HR


 


 Potassium


 Chloride/Water  100 ml @ 


 100 mls/hr  1X  ONCE  4/18/20 14:45


 4/18/20 15:44 DC 4/18/20 17:28


100 MLS/HR


 


 Potassium


 Phosphate 20 mmol/


 Sodium Chloride  106.6667


 ml @ 


 51.667 m...  1X  ONCE  3/25/20 13:00


 3/25/20 15:03 DC 3/25/20 12:51


51.667 MLS/HR


 


 Potassium Acetate


 55 meq/Magnesium


 Sulfate 20 meq/


 Calcium Gluconate


 10 meq/


 Multivitamins 10


 ml/Chromium/


 Copper/Manganese/


 Seleni/Zn 0.5 ml/


 Insulin Human


 Regular 35 unit/


 Total Parenteral


 Nutrition/Amino


 Acids/Dextrose/


 Fat Emulsion


 Intravenous  1,920 ml @ 


 80 mls/hr  TPN  CONT  4/27/20 22:00


 4/28/20 21:59  4/27/20 22:01


80 MLS/HR


 


 Prochlorperazine


 Edisylate


  (Compazine)  5 mg  PACU PRN  PRN  4/27/20 07:00


 4/28/20 06:59 DC  





 


 Propofol  20 ml @ As


 Directed  STK-MED ONCE  4/27/20 12:26


 4/27/20 12:27 DC  





 


 Ringer's Solution  1,000 ml @ 


 30 mls/hr  Q24H  4/27/20 07:00


 4/27/20 18:59 DC  





 


 Rocuronium Bromide


  (Zemuron)  50 mg  STK-MED ONCE  4/27/20 10:56


 4/27/20 10:57 DC  





 


 Sevoflurane


  (Ultane)  60 ml  STK-MED ONCE  4/27/20 12:26


 4/27/20 12:27 DC  





 


 Sodium


 Bicarbonate 50


 meq/Sodium


 Chloride  1,050 ml @ 


 75 mls/hr  Q14H  3/18/20 07:30


 3/23/20 10:28 DC 3/22/20 21:10


75 MLS/HR


 


 Sodium Acetate 50


 meq/Potassium


 Acetate 55 meq/


 Magnesium Sulfate


 20 meq/Calcium


 Gluconate 10 meq/


 Multivitamins 10


 ml/Chromium/


 Copper/Manganese/


 Seleni/Zn 0.5 ml/


 Insulin Human


 Regular 35 unit/


 Total Parenteral


 Nutrition/Amino


 Acids/Dextrose/


 Fat Emulsion


 Intravenous  1,800 ml @ 


 75 mls/hr  TPN  CONT  4/25/20 22:00


 4/26/20 21:59 DC 4/25/20 22:03


75 MLS/HR


 


 Sodium Chloride  1,000 ml @ 


 25 mls/hr  Q24H  4/27/20 14:30


   UNV  





 


 Sodium Chloride


  (Normal Saline


 Flush)  3 ml  QSHIFT  PRN  4/27/20 13:45


     





 


 Sodium Chloride


 90 meq/Calcium


 Gluconate 10 meq/


 Multivitamins 10


 ml/Chromium/


 Copper/Manganese/


 Seleni/Zn 0.5 ml/


 Total Parenteral


 Nutrition/Amino


 Acids/Dextrose/


 Fat Emulsion


 Intravenous  1,512 ml @ 


 63 mls/hr  TPN  CONT  3/18/20 22:00


 3/19/20 21:59 DC 3/18/20 22:06


63 MLS/HR


 


 Sodium Chloride


 90 meq/Calcium


 Gluconate 10 meq/


 Multivitamins 10


 ml/Chromium/


 Copper/Manganese/


 Seleni/Zn 1 ml/


 Total Parenteral


 Nutrition/Amino


 Acids/Dextrose/


 Fat Emulsion


 Intravenous  55.005 ml


  @ 2.292


 mls/hr  TPN  CONT  3/18/20 22:00


 3/18/20 12:33 DC  





 


 Sodium Chloride


 90 meq/Magnesium


 Sulfate 10 meq/


 Calcium Gluconate


 20 meq/


 Multivitamins 10


 ml/Chromium/


 Copper/Manganese/


 Seleni/Zn 0.5 ml/


 Total Parenteral


 Nutrition/Amino


 Acids/Dextrose/


 Fat Emulsion


 Intravenous  1,512 ml @ 


 63 mls/hr  TPN  CONT  3/19/20 22:00


 3/20/20 21:59 DC 3/19/20 22:25


63 MLS/HR


 


 Sodium Chloride


 90 meq/Magnesium


 Sulfate 12 meq/


 Calcium Gluconate


 15 meq/


 Multivitamins 10


 ml/Chromium/


 Copper/Manganese/


 Seleni/Zn 0.5 ml/


 Insulin Human


 Regular 25 unit/


 Total Parenteral


 Nutrition/Amino


 Acids/Dextrose/


 Fat Emulsion


 Intravenous  1,400 ml @ 


 58.333 mls/


 hr  TPN  CONT  4/8/20 22:00


 4/9/20 21:59 DC 4/8/20 21:41


58.333 MLS/HR


 


 Sodium Chloride


 90 meq/Potassium


 Chloride 15 meq/


 Magnesium Sulfate


 12 meq/Calcium


 Gluconate 15 meq/


 Multivitamins 10


 ml/Chromium/


 Copper/Manganese/


 Seleni/Zn 0.5 ml/


 Insulin Human


 Regular 25 unit/


 Total Parenteral


 Nutrition/Amino


 Acids/Dextrose/


 Fat Emulsion


 Intravenous  1,400 ml @ 


 58.333 mls/


 hr  TPN  CONT  4/7/20 22:00


 4/8/20 21:59 DC 4/7/20 22:13


58.333 MLS/HR


 


 Sodium Chloride


 90 meq/Potassium


 Chloride 15 meq/


 Potassium


 Phosphate 10 mmol/


 Magnesium Sulfate


 8 meq/Calcium


 Gluconate 15 meq/


 Multivitamins 10


 ml/Chromium/


 Copper/Manganese/


 Seleni/Zn 0.5 ml/


 Insulin Human


 Regular 25 unit/


 Total Parenteral


 Nutrition/Amino


 Acids/Dextrose/


 Fat Emulsion


 Intravenous  1,400 ml @ 


 58.333 mls/


 hr  TPN  CONT  4/5/20 22:00


 4/6/20 21:59 DC 4/5/20 21:20


58.333 MLS/HR


 


 Sodium Chloride


 90 meq/Potassium


 Chloride 15 meq/


 Potassium


 Phosphate 10 mmol/


 Magnesium Sulfate


 10 meq/Calcium


 Gluconate 20 meq/


 Multivitamins 10


 ml/Chromium/


 Copper/Manganese/


 Seleni/Zn 0.5 ml/


 Total Parenteral


 Nutrition/Amino


 Acids/Dextrose/


 Fat Emulsion


 Intravenous  1,400 ml @ 


 58.333 mls/


 hr  TPN  CONT  3/23/20 22:00


 3/24/20 21:59 DC 3/23/20 21:42


58.333 MLS/HR


 


 Sodium Chloride


 90 meq/Potassium


 Chloride 15 meq/


 Potassium


 Phosphate 10 mmol/


 Magnesium Sulfate


 12 meq/Calcium


 Gluconate 15 meq/


 Multivitamins 10


 ml/Chromium/


 Copper/Manganese/


 Seleni/Zn 0.5 ml/


 Insulin Human


 Regular 25 unit/


 Total Parenteral


 Nutrition/Amino


 Acids/Dextrose/


 Fat Emulsion


 Intravenous  1,400 ml @ 


 58.333 mls/


 hr  TPN  CONT  4/6/20 22:00


 4/7/20 21:59 DC 4/6/20 22:24


58.333 MLS/HR


 


 Sodium Chloride


 90 meq/Potassium


 Chloride 15 meq/


 Potassium


 Phosphate 15 mmol/


 Magnesium Sulfate


 10 meq/Calcium


 Gluconate 15 meq/


 Multivitamins 10


 ml/Chromium/


 Copper/Manganese/


 Seleni/Zn 0.5 ml/


 Total Parenteral


 Nutrition/Amino


 Acids/Dextrose/


 Fat Emulsion


 Intravenous  1,400 ml @ 


 58.333 mls/


 hr  TPN  CONT  3/24/20 22:00


 3/25/20 21:59 DC 3/24/20 22:17


58.333 MLS/HR


 


 Sodium Chloride


 90 meq/Potassium


 Chloride 15 meq/


 Potassium


 Phosphate 15 mmol/


 Magnesium Sulfate


 10 meq/Calcium


 Gluconate 20 meq/


 Multivitamins 10


 ml/Chromium/


 Copper/Manganese/


 Seleni/Zn 0.5 ml/


 Total Parenteral


 Nutrition/Amino


 Acids/Dextrose/


 Fat Emulsion


 Intravenous  1,200 ml @ 


 50 mls/hr  TPN  CONT  3/22/20 22:00


 3/22/20 14:17 DC  





 


 Sodium Chloride


 90 meq/Potassium


 Chloride 15 meq/


 Potassium


 Phosphate 18 mmol/


 Magnesium Sulfate


 8 meq/Calcium


 Gluconate 15 meq/


 Multivitamins 10


 ml/Chromium/


 Copper/Manganese/


 Seleni/Zn 0.5 ml/


 Insulin Human


 Regular 10 unit/


 Total Parenteral


 Nutrition/Amino


 Acids/Dextrose/


 Fat Emulsion


 Intravenous  1,400 ml @ 


 58.333 mls/


 hr  TPN  CONT  3/27/20 22:00


 3/28/20 21:59 DC 3/27/20 21:43


58.333 MLS/HR


 


 Sodium Chloride


 90 meq/Potassium


 Chloride 15 meq/


 Potassium


 Phosphate 18 mmol/


 Magnesium Sulfate


 8 meq/Calcium


 Gluconate 15 meq/


 Multivitamins 10


 ml/Chromium/


 Copper/Manganese/


 Seleni/Zn 0.5 ml/


 Insulin Human


 Regular 15 unit/


 Total Parenteral


 Nutrition/Amino


 Acids/Dextrose/


 Fat Emulsion


 Intravenous  1,400 ml @ 


 58.333 mls/


 hr  TPN  CONT  3/30/20 22:00


 3/31/20 21:59 DC 3/30/20 21:47


58.333 MLS/HR


 


 Sodium Chloride


 90 meq/Potassium


 Chloride 15 meq/


 Potassium


 Phosphate 18 mmol/


 Magnesium Sulfate


 8 meq/Calcium


 Gluconate 15 meq/


 Multivitamins 10


 ml/Chromium/


 Copper/Manganese/


 Seleni/Zn 0.5 ml/


 Insulin Human


 Regular 20 unit/


 Total Parenteral


 Nutrition/Amino


 Acids/Dextrose/


 Fat Emulsion


 Intravenous  1,400 ml @ 


 58.333 mls/


 hr  TPN  CONT  4/2/20 22:00


 4/3/20 21:59 DC 4/2/20 22:45


58.333 MLS/HR


 


 Sodium Chloride


 90 meq/Potassium


 Chloride 15 meq/


 Potassium


 Phosphate 18 mmol/


 Magnesium Sulfate


 8 meq/Calcium


 Gluconate 15 meq/


 Multivitamins 10


 ml/Chromium/


 Copper/Manganese/


 Seleni/Zn 0.5 ml/


 Total Parenteral


 Nutrition/Amino


 Acids/Dextrose/


 Fat Emulsion


 Intravenous  1,400 ml @ 


 58.333 mls/


 hr  TPN  CONT  3/26/20 22:00


 3/27/20 21:59 DC 3/26/20 22:00


58.333 MLS/HR


 


 Sodium Chloride


 90 meq/Potassium


 Phosphate 15 mmol/


 Magnesium Sulfate


 12 meq/Calcium


 Gluconate 15 meq/


 Multivitamins 10


 ml/Chromium/


 Copper/Manganese/


 Seleni/Zn 0.5 ml/


 Insulin Human


 Regular 30 unit/


 Total Parenteral


 Nutrition/Amino


 Acids/Dextrose/


 Fat Emulsion


 Intravenous  1,400 ml @ 


 58.333 mls/


 hr  TPN  CONT  4/10/20 22:00


 4/11/20 21:59 DC 4/10/20 21:49


58.333 MLS/HR


 


 Sodium Chloride


 90 meq/Potassium


 Phosphate 15 mmol/


 Magnesium Sulfate


 12 meq/Calcium


 Gluconate 15 meq/


 Multivitamins 10


 ml/Chromium/


 Copper/Manganese/


 Seleni/Zn 0.5 ml/


 Insulin Human


 Regular 40 unit/


 Total Parenteral


 Nutrition/Amino


 Acids/Dextrose/


 Fat Emulsion


 Intravenous  1,400 ml @ 


 58.333 mls/


 hr  TPN  CONT  4/11/20 22:00


 4/12/20 21:59 DC 4/11/20 21:21


58.333 MLS/HR


 


 Sodium Chloride


 90 meq/Potassium


 Phosphate 19 mmol/


 Magnesium Sulfate


 12 meq/Calcium


 Gluconate 15 meq/


 Multivitamins 10


 ml/Chromium/


 Copper/Manganese/


 Seleni/Zn 0.5 ml/


 Insulin Human


 Regular 40 unit/


 Total Parenteral


 Nutrition/Amino


 Acids/Dextrose/


 Fat Emulsion


 Intravenous  1,400 ml @ 


 58.333 mls/


 hr  TPN  CONT  4/12/20 22:00


 4/13/20 21:59 DC 4/12/20 21:54


58.333 MLS/HR


 


 Sodium Chloride


 90 meq/Potassium


 Phosphate 5 mmol/


 Magnesium Sulfate


 12 meq/Calcium


 Gluconate 15 meq/


 Multivitamins 10


 ml/Chromium/


 Copper/Manganese/


 Seleni/Zn 0.5 ml/


 Insulin Human


 Regular 30 unit/


 Total Parenteral


 Nutrition/Amino


 Acids/Dextrose/


 Fat Emulsion


 Intravenous  1,400 ml @ 


 58.333 mls/


 hr  TPN  CONT  4/9/20 22:00


 4/10/20 21:59 DC 4/9/20 22:08


58.333 MLS/HR


 


 Sodium Chloride


 100 meq/Potassium


 Chloride 40 meq/


 Magnesium Sulfate


 15 meq/Calcium


 Gluconate 15 meq/


 Multivitamins 10


 ml/Chromium/


 Copper/Manganese/


 Seleni/Zn 0.5 ml/


 Insulin Human


 Regular 35 unit/


 Total Parenteral


 Nutrition/Amino


 Acids/Dextrose/


 Fat Emulsion


 Intravenous  1,400 ml @ 


 58.333 mls/


 hr  TPN  CONT  4/19/20 22:00


 4/20/20 21:59 DC 4/19/20 22:46


58.333 MLS/HR


 


 Sodium Chloride


 100 meq/Potassium


 Chloride 40 meq/


 Magnesium Sulfate


 20 meq/Calcium


 Gluconate 10 meq/


 Multivitamins 10


 ml/Chromium/


 Copper/Manganese/


 Seleni/Zn 0.5 ml/


 Insulin Human


 Regular 35 unit/


 Total Parenteral


 Nutrition/Amino


 Acids/Dextrose/


 Fat Emulsion


 Intravenous  1,400 ml @ 


 58.333 mls/


 hr  TPN  CONT  4/23/20 22:00


 4/24/20 21:59 DC 4/24/20 00:06


58.333 MLS/HR


 


 Sodium Chloride


 100 meq/Potassium


 Chloride 40 meq/


 Magnesium Sulfate


 20 meq/Calcium


 Gluconate 15 meq/


 Multivitamins 10


 ml/Chromium/


 Copper/Manganese/


 Seleni/Zn 0.5 ml/


 Insulin Human


 Regular 35 unit/


 Total Parenteral


 Nutrition/Amino


 Acids/Dextrose/


 Fat Emulsion


 Intravenous  1,400 ml @ 


 58.333 mls/


 hr  TPN  CONT  4/22/20 22:00


 4/23/20 21:59 DC 4/22/20 22:27


58.333 MLS/HR


 


 Sodium Chloride


 100 meq/Potassium


 Phosphate 10 mmol/


 Magnesium Sulfate


 12 meq/Calcium


 Gluconate 15 meq/


 Multivitamins 10


 ml/Chromium/


 Copper/Manganese/


 Seleni/Zn 0.5 ml/


 Insulin Human


 Regular 35 unit/


 Potassium


 Chloride 20 meq/


 Total Parenteral


 Nutrition/Amino


 Acids/Dextrose/


 Fat Emulsion


 Intravenous  1,400 ml @ 


 58.333 mls/


 hr  TPN  CONT  4/16/20 22:00


 4/17/20 21:59 DC 4/16/20 22:10


58.333 MLS/HR


 


 Sodium Chloride


 100 meq/Potassium


 Phosphate 19 mmol/


 Magnesium Sulfate


 12 meq/Calcium


 Gluconate 15 meq/


 Multivitamins 10


 ml/Chromium/


 Copper/Manganese/


 Seleni/Zn 0.5 ml/


 Insulin Human


 Regular 40 unit/


 Potassium


 Chloride 20 meq/


 Total Parenteral


 Nutrition/Amino


 Acids/Dextrose/


 Fat Emulsion


 Intravenous  1,400 ml @ 


 58.333 mls/


 hr  TPN  CONT  4/15/20 22:00


 4/16/20 21:59 DC 4/15/20 21:20


58.333 MLS/HR


 


 Sodium Chloride


 100 meq/Potassium


 Phosphate 5 mmol/


 Magnesium Sulfate


 12 meq/Calcium


 Gluconate 15 meq/


 Multivitamins 10


 ml/Chromium/


 Copper/Manganese/


 Seleni/Zn 0.5 ml/


 Insulin Human


 Regular 35 unit/


 Potassium


 Chloride 20 meq/


 Total Parenteral


 Nutrition/Amino


 Acids/Dextrose/


 Fat Emulsion


 Intravenous  1,400 ml @ 


 58.333 mls/


 hr  TPN  CONT  4/17/20 22:00


 4/18/20 21:59 DC 4/17/20 22:59


58.333 MLS/HR


 


 Succinylcholine


 Chloride


  (Anectine)  120 mg  1X  ONCE  3/23/20 08:30


 3/23/20 08:31 DC 3/23/20 08:34


120 MG











Labs:


Lab





Laboratory Tests








Test


 4/27/20


13:40 4/27/20


18:04 4/28/20


00:57 4/28/20


07:00


 


Glucose (Fingerstick)


 163 mg/dL


(70-99) 208 mg/dL


(70-99) 208 mg/dL


(70-99) 





 


White Blood Count


 


 


 


 6.3 x10^3/uL


(4.0-11.0)


 


Red Blood Count


 


 


 


 4.12 x10^6/uL


(3.50-5.40)


 


Hemoglobin


 


 


 


 12.1 g/dL


(12.0-15.5)


 


Hematocrit


 


 


 


 37.5 %


(36.0-47.0)


 


Mean Corpuscular Volume    91 fL () 


 


Mean Corpuscular Hemoglobin    29 pg (25-35) 


 


Mean Corpuscular Hemoglobin


Concent 


 


 


 32 g/dL


(31-37)


 


Red Cell Distribution Width


 


 


 


 18.3 %


(11.5-14.5)


 


Platelet Count


 


 


 


 267 x10^3/uL


(140-400)


 


Neutrophils (%) (Auto)    74 % (31-73) 


 


Lymphocytes (%) (Auto)    20 % (24-48) 


 


Monocytes (%) (Auto)    5 % (0-9) 


 


Eosinophils (%) (Auto)    0 % (0-3) 


 


Basophils (%) (Auto)    0 % (0-3) 


 


Neutrophils # (Auto)


 


 


 


 4.7 x10^3/uL


(1.8-7.7)


 


Lymphocytes # (Auto)


 


 


 


 1.3 x10^3/uL


(1.0-4.8)


 


Monocytes # (Auto)


 


 


 


 0.3 x10^3/uL


(0.0-1.1)


 


Eosinophils # (Auto)


 


 


 


 0.0 x10^3/uL


(0.0-0.7)


 


Basophils # (Auto)


 


 


 


 0.0 x10^3/uL


(0.0-0.2)


 


Sodium Level


 


 


 


 150 mmol/L


(136-145)


 


Potassium Level


 


 


 


 3.9 mmol/L


(3.5-5.1)


 


Chloride Level


 


 


 


 111 mmol/L


()


 


Carbon Dioxide Level


 


 


 


 28 mmol/L


(21-32)


 


Anion Gap    11 (6-14) 


 


Blood Urea Nitrogen


 


 


 


 63 mg/dL


(7-20)


 


Creatinine


 


 


 


 0.9 mg/dL


(0.6-1.0)


 


Estimated GFR


(Cockcroft-Gault) 


 


 


 66.5 





 


Glucose Level


 


 


 


 162 mg/dL


(70-99)


 


Calcium Level


 


 


 


 8.8 mg/dL


(8.5-10.1)


 


Iron Level


 


 


 


 23 ug/dL


()


 


Total Iron Binding Capacity


 


 


 


 76 ug/dL


(250-450)


 


Iron Saturation    30 % (15-34) 











Objective:


Assessment:


Fever resolving


Acute pancreatitis with persistent  necrosis


CT a/p 4/9


    Increased ascites. Persistent evidence of necrotizing pancreatitis with 

fluid and phlegmon


   at the pancreas


   4/27 status post ROBERT drain placement; 


Cholelithiasis with thickening of the gallbladder wall.


Leucocytosis improving


JED,Hyperkalemia, Metabolic acidosis off dialysis


Acute hypoxic resp failure ,bilateral pleural effusion and atelectasis


hypocalcemia 


Prediabetes


HTN


s/p trach





Plan:


Plan of Care


cont merrem (4/8), micafungin, daptomycin


 (zyvox changed to dapto (4/22), to rule out serotonin sy causing fever)


Monitor for abx toxicities. 


Maintain aspiration precautions 


PT and OT as tolerated





D/w nursing





Critically ill











IVAN FRANZ MD           Apr 28, 2020 09:25

## 2020-04-28 NOTE — PDOC
PULMONARY PROGRESS NOTES


Subjective


Patient intubated on 3/23 , s/p trach 4/6, on vent


Taken to the OR 4/27, NO SURGERY DONE / NOT A CANDIDATE


Vitals





Vital Signs








  Date Time  Temp Pulse Resp B/P (MAP) Pulse Ox O2 Delivery O2 Flow Rate FiO2


 


4/28/20 10:49     97 Ventilator  


 


4/28/20 06:00  72 18 131/76 (94)    


 


4/28/20 04:00 98.7       





 98.7       








Comments


ros unable to obtain  on vent


General:  Alert


HEENT:  Other (nc at perrl   nose clear  nech  trach site ok  no lad   no 

thyromegaly)


Lungs:  Crackles, Other (dimished in BLL  nc at perrl   nose clear   neck trach 

site ok   no lad  no thyromegaly)


Cardiovascular:  S1, S2


Abdomen:  Soft, Non-tender, Other (distended)


Neuro Exam:  Alert


Extremities:  Other (+3 generalized edema )


Skin:  Warm, Dry


Labs





Laboratory Tests








Test


 4/26/20


12:09 4/26/20


17:28 4/27/20


00:41 4/27/20


04:30


 


Glucose (Fingerstick)


 148 mg/dL


(70-99) 144 mg/dL


(70-99) 134 mg/dL


(70-99) 





 


White Blood Count


 


 


 


 9.2 x10^3/uL


(4.0-11.0)


 


Red Blood Count


 


 


 


 2.72 x10^6/uL


(3.50-5.40)


 


Hemoglobin


 


 


 


 8.2 g/dL


(12.0-15.5)


 


Hematocrit


 


 


 


 24.6 %


(36.0-47.0)


 


Mean Corpuscular Volume    90 fL () 


 


Mean Corpuscular Hemoglobin    30 pg (25-35) 


 


Mean Corpuscular Hemoglobin


Concent 


 


 


 33 g/dL


(31-37)


 


Red Cell Distribution Width


 


 


 


 18.4 %


(11.5-14.5)


 


Platelet Count


 


 


 


 323 x10^3/uL


(140-400)


 


Neutrophils (%) (Auto)    78 % (31-73) 


 


Lymphocytes (%) (Auto)    15 % (24-48) 


 


Monocytes (%) (Auto)    6 % (0-9) 


 


Eosinophils (%) (Auto)    1 % (0-3) 


 


Basophils (%) (Auto)    0 % (0-3) 


 


Neutrophils # (Auto)


 


 


 


 7.1 x10^3/uL


(1.8-7.7)


 


Lymphocytes # (Auto)


 


 


 


 1.4 x10^3/uL


(1.0-4.8)


 


Monocytes # (Auto)


 


 


 


 0.5 x10^3/uL


(0.0-1.1)


 


Eosinophils # (Auto)


 


 


 


 0.1 x10^3/uL


(0.0-0.7)


 


Basophils # (Auto)


 


 


 


 0.0 x10^3/uL


(0.0-0.2)


 


Sodium Level


 


 


 


 149 mmol/L


(136-145)


 


Potassium Level


 


 


 


 3.6 mmol/L


(3.5-5.1)


 


Chloride Level


 


 


 


 111 mmol/L


()


 


Carbon Dioxide Level


 


 


 


 30 mmol/L


(21-32)


 


Anion Gap    8 (6-14) 


 


Blood Urea Nitrogen


 


 


 


 57 mg/dL


(7-20)


 


Creatinine


 


 


 


 1.1 mg/dL


(0.6-1.0)


 


Estimated GFR


(Cockcroft-Gault) 


 


 


 52.8 





 


Glucose Level


 


 


 


 123 mg/dL


(70-99)


 


Calcium Level


 


 


 


 8.5 mg/dL


(8.5-10.1)


 


Creatine Kinase


 


 


 


 24 U/L


()


 


Test


 4/27/20


13:40 4/27/20


18:04 4/28/20


00:57 4/28/20


07:00


 


Glucose (Fingerstick)


 163 mg/dL


(70-99) 208 mg/dL


(70-99) 208 mg/dL


(70-99) 





 


White Blood Count


 


 


 


 6.3 x10^3/uL


(4.0-11.0)


 


Red Blood Count


 


 


 


 4.12 x10^6/uL


(3.50-5.40)


 


Hemoglobin


 


 


 


 12.1 g/dL


(12.0-15.5)


 


Hematocrit


 


 


 


 37.5 %


(36.0-47.0)


 


Mean Corpuscular Volume    91 fL () 


 


Mean Corpuscular Hemoglobin    29 pg (25-35) 


 


Mean Corpuscular Hemoglobin


Concent 


 


 


 32 g/dL


(31-37)


 


Red Cell Distribution Width


 


 


 


 18.3 %


(11.5-14.5)


 


Platelet Count


 


 


 


 267 x10^3/uL


(140-400)


 


Neutrophils (%) (Auto)    74 % (31-73) 


 


Lymphocytes (%) (Auto)    20 % (24-48) 


 


Monocytes (%) (Auto)    5 % (0-9) 


 


Eosinophils (%) (Auto)    0 % (0-3) 


 


Basophils (%) (Auto)    0 % (0-3) 


 


Neutrophils # (Auto)


 


 


 


 4.7 x10^3/uL


(1.8-7.7)


 


Lymphocytes # (Auto)


 


 


 


 1.3 x10^3/uL


(1.0-4.8)


 


Monocytes # (Auto)


 


 


 


 0.3 x10^3/uL


(0.0-1.1)


 


Eosinophils # (Auto)


 


 


 


 0.0 x10^3/uL


(0.0-0.7)


 


Basophils # (Auto)


 


 


 


 0.0 x10^3/uL


(0.0-0.2)


 


Sodium Level


 


 


 


 150 mmol/L


(136-145)


 


Potassium Level


 


 


 


 3.9 mmol/L


(3.5-5.1)


 


Chloride Level


 


 


 


 111 mmol/L


()


 


Carbon Dioxide Level


 


 


 


 28 mmol/L


(21-32)


 


Anion Gap    11 (6-14) 


 


Blood Urea Nitrogen


 


 


 


 63 mg/dL


(7-20)


 


Creatinine


 


 


 


 0.9 mg/dL


(0.6-1.0)


 


Estimated GFR


(Cockcroft-Gault) 


 


 


 66.5 





 


Glucose Level


 


 


 


 162 mg/dL


(70-99)


 


Calcium Level


 


 


 


 8.8 mg/dL


(8.5-10.1)


 


Iron Level


 


 


 


 23 ug/dL


()


 


Total Iron Binding Capacity


 


 


 


 76 ug/dL


(250-450)


 


Iron Saturation    30 % (15-34) 








Laboratory Tests








Test


 4/27/20


13:40 4/27/20


18:04 4/28/20


00:57 4/28/20


07:00


 


Glucose (Fingerstick)


 163 mg/dL


(70-99) 208 mg/dL


(70-99) 208 mg/dL


(70-99) 





 


White Blood Count


 


 


 


 6.3 x10^3/uL


(4.0-11.0)


 


Red Blood Count


 


 


 


 4.12 x10^6/uL


(3.50-5.40)


 


Hemoglobin


 


 


 


 12.1 g/dL


(12.0-15.5)


 


Hematocrit


 


 


 


 37.5 %


(36.0-47.0)


 


Mean Corpuscular Volume    91 fL () 


 


Mean Corpuscular Hemoglobin    29 pg (25-35) 


 


Mean Corpuscular Hemoglobin


Concent 


 


 


 32 g/dL


(31-37)


 


Red Cell Distribution Width


 


 


 


 18.3 %


(11.5-14.5)


 


Platelet Count


 


 


 


 267 x10^3/uL


(140-400)


 


Neutrophils (%) (Auto)    74 % (31-73) 


 


Lymphocytes (%) (Auto)    20 % (24-48) 


 


Monocytes (%) (Auto)    5 % (0-9) 


 


Eosinophils (%) (Auto)    0 % (0-3) 


 


Basophils (%) (Auto)    0 % (0-3) 


 


Neutrophils # (Auto)


 


 


 


 4.7 x10^3/uL


(1.8-7.7)


 


Lymphocytes # (Auto)


 


 


 


 1.3 x10^3/uL


(1.0-4.8)


 


Monocytes # (Auto)


 


 


 


 0.3 x10^3/uL


(0.0-1.1)


 


Eosinophils # (Auto)


 


 


 


 0.0 x10^3/uL


(0.0-0.7)


 


Basophils # (Auto)


 


 


 


 0.0 x10^3/uL


(0.0-0.2)


 


Sodium Level


 


 


 


 150 mmol/L


(136-145)


 


Potassium Level


 


 


 


 3.9 mmol/L


(3.5-5.1)


 


Chloride Level


 


 


 


 111 mmol/L


()


 


Carbon Dioxide Level


 


 


 


 28 mmol/L


(21-32)


 


Anion Gap    11 (6-14) 


 


Blood Urea Nitrogen


 


 


 


 63 mg/dL


(7-20)


 


Creatinine


 


 


 


 0.9 mg/dL


(0.6-1.0)


 


Estimated GFR


(Cockcroft-Gault) 


 


 


 66.5 





 


Glucose Level


 


 


 


 162 mg/dL


(70-99)


 


Calcium Level


 


 


 


 8.8 mg/dL


(8.5-10.1)


 


Iron Level


 


 


 


 23 ug/dL


()


 


Total Iron Binding Capacity


 


 


 


 76 ug/dL


(250-450)


 


Iron Saturation    30 % (15-34) 








Medications





Active Scripts








 Medications  Dose


 Route/Sig


 Max Daily Dose Days Date Category


 


 Bisoprolol


 Fumarate 5 Mg


 Tablet  10 Mg


 PO DAILY


   3/16/20 Reported








Comments


cxr reviewed.





Impression


.


IMPRESSION:


1.  Acute hypoxemic respiratory failure secondary to ARDS status post trach,


2.  Gallstone pancreatitis


3.  Severe metabolic acidosis.stable


4.  Acute kidney injury-stable, ON HD-- continue to improve 


5.  Acute gallstone pancreatitis.


6.  Hypoalbuminemia.


7.  Moderate persistent effusions


8.  Fever-persist.  Per ID, per surgery


9.  Chronic anemia


10. Covid 19 testing negative


11. Moderate to large ascites-S/P paracentisis


S/P paracentisis with 4 liters removed on 4/15/20














Surgery note


Operative Note:


After obtaining informed consent, patient was taken to OR, induced under GETA 

and prepped in the usual fashion.  5 mm port placed umbilical and right mid 

abdomen, all under laparoscopic guidance.  Large amount of ascites encountered 

and aspirated off.  Fluid was clear with some whitish debris.  Viscera was 

completely locked in with obliteration of all planes, preventing any significant

exploration.  Copious irrigation.





Given patient's overall clinical improvement, favor against open procedure and 

attempt at cholecystectomy and/or necrosectomy, given high risk of 

complications.  Cholecystectomy will need to be performed, but favor waiting 3 

months.





19 ROBERT drain placed and secured with 3 0 nylon.  Skin repaired with 4 0 monocryl.

 Dressing placed.





Patient tolerated procedure well and sent to PACU in stable condition.  All 

counts correct.  Wound class is 4.





Plan


.


We will continue our efforts at weaning


CPAP TRIAL/ ANXIETY PERSISTENT/ ON PRECEDEX


MAY GO FOR TS SOON


Follow surgery input


hep sq and protonix for prophylaxis


Follow ID rec, abx per id


Follow nephrology recs 


Nutritional support per surgery


continue TPN for nutrition 


DVT/GI PPX 


am cxr


d/w RN/RT











SHARYN SOLORZANO MD                 Apr 28, 2020 11:13

## 2020-04-28 NOTE — PDOC
Subjective:


Subjective:


Indicates feeling "so-so."  Asks when she can drink.





Objective:


Objective:


Nurse present - more alert today, no fever, hasn't stooled in a couple days.


Vital Signs:





                                   Vital Signs








  Date Time  Temp Pulse Resp B/P (MAP) Pulse Ox O2 Delivery O2 Flow Rate FiO2


 


4/28/20 07:42     98 Ventilator  


 


4/28/20 06:00  72 18 131/76 (94)    


 


4/28/20 04:00 98.7       





 98.7       








Labs:





Laboratory Tests








Test


 4/27/20


13:40 4/27/20


18:04 4/28/20


00:57 4/28/20


07:00


 


Glucose (Fingerstick) 163 mg/dL  208 mg/dL  208 mg/dL  


 


White Blood Count    6.3 x10^3/uL 


 


Red Blood Count    4.12 x10^6/uL 


 


Hemoglobin    12.1 g/dL 


 


Hematocrit    37.5 % 


 


Mean Corpuscular Volume    91 fL 


 


Mean Corpuscular Hemoglobin    29 pg 


 


Mean Corpuscular Hemoglobin


Concent 


 


 


 32 g/dL 





 


Red Cell Distribution Width    18.3 % 


 


Platelet Count    267 x10^3/uL 


 


Neutrophils (%) (Auto)    74 % 


 


Lymphocytes (%) (Auto)    20 % 


 


Monocytes (%) (Auto)    5 % 


 


Eosinophils (%) (Auto)    0 % 


 


Basophils (%) (Auto)    0 % 


 


Neutrophils # (Auto)    4.7 x10^3/uL 


 


Lymphocytes # (Auto)    1.3 x10^3/uL 


 


Monocytes # (Auto)    0.3 x10^3/uL 


 


Eosinophils # (Auto)    0.0 x10^3/uL 


 


Basophils # (Auto)    0.0 x10^3/uL 


 


Sodium Level    150 mmol/L 


 


Potassium Level    3.9 mmol/L 


 


Chloride Level    111 mmol/L 


 


Carbon Dioxide Level    28 mmol/L 


 


Anion Gap    11 


 


Blood Urea Nitrogen    63 mg/dL 


 


Creatinine    0.9 mg/dL 


 


Estimated GFR


(Cockcroft-Gault) 


 


 


 66.5 





 


Glucose Level    162 mg/dL 


 


Calcium Level    8.8 mg/dL 


 


Iron Level    23 ug/dL 


 


Total Iron Binding Capacity    76 ug/dL 


 


Iron Saturation    30 % 











PE:





GEN: NAD


LUNGS: trach/vent


HEART: RRR


ABD: occasional groan, distended (less?)


NEURO/PSYCH: much more alert





A/P:


Necrotizing pancreatitis s/p laparoscopic exploration - reviewed OR note: 1000 

cc ascites suctioned off, cultures sent, diffuse debris, obliteration of 

surgical planes preventing any meaningful exploration





--


More alert today, asking about PO intake.





Hemodynamically unstable?:  No


Is patient in severe pain?:  No


Is NPO status required?:  Yes











RAGHAVENDRA MURCIA         Apr 28, 2020 09:27

## 2020-04-28 NOTE — PDOC
Renal-Progress Notes


Subjective Notes


Notes


AWAKE ALERT





History of Present Illness


Hx of present illness


STABLE





Vitals


Vitals





Vital Signs








  Date Time  Temp Pulse Resp B/P (MAP) Pulse Ox O2 Delivery O2 Flow Rate FiO2


 


4/28/20 10:49     97 Ventilator  


 


4/28/20 06:00  72 18 131/76 (94)    


 


4/28/20 04:00 98.7       





 98.7       








Weight


Weight [ ]





I.O.


Intake and Output











Intake and Output 


 


 4/28/20





 07:00


 


Intake Total 3470 ml


 


Output Total 2985 ml


 


Balance 485 ml


 


 


 


Intake Oral 0 ml


 


IV Total 3470 ml


 


Output Urine Total 2785 ml


 


Drainage Total 200 ml











Labs


Labs





Laboratory Tests








Test


 4/27/20


13:40 4/27/20


18:04 4/28/20


00:57 4/28/20


07:00


 


Glucose (Fingerstick)


 163 mg/dL


(70-99) 208 mg/dL


(70-99) 208 mg/dL


(70-99) 





 


White Blood Count


 


 


 


 6.3 x10^3/uL


(4.0-11.0)


 


Red Blood Count


 


 


 


 4.12 x10^6/uL


(3.50-5.40)


 


Hemoglobin


 


 


 


 12.1 g/dL


(12.0-15.5)


 


Hematocrit


 


 


 


 37.5 %


(36.0-47.0)


 


Mean Corpuscular Volume    91 fL () 


 


Mean Corpuscular Hemoglobin    29 pg (25-35) 


 


Mean Corpuscular Hemoglobin


Concent 


 


 


 32 g/dL


(31-37)


 


Red Cell Distribution Width


 


 


 


 18.3 %


(11.5-14.5)


 


Platelet Count


 


 


 


 267 x10^3/uL


(140-400)


 


Neutrophils (%) (Auto)    74 % (31-73) 


 


Lymphocytes (%) (Auto)    20 % (24-48) 


 


Monocytes (%) (Auto)    5 % (0-9) 


 


Eosinophils (%) (Auto)    0 % (0-3) 


 


Basophils (%) (Auto)    0 % (0-3) 


 


Neutrophils # (Auto)


 


 


 


 4.7 x10^3/uL


(1.8-7.7)


 


Lymphocytes # (Auto)


 


 


 


 1.3 x10^3/uL


(1.0-4.8)


 


Monocytes # (Auto)


 


 


 


 0.3 x10^3/uL


(0.0-1.1)


 


Eosinophils # (Auto)


 


 


 


 0.0 x10^3/uL


(0.0-0.7)


 


Basophils # (Auto)


 


 


 


 0.0 x10^3/uL


(0.0-0.2)


 


Sodium Level


 


 


 


 150 mmol/L


(136-145)


 


Potassium Level


 


 


 


 3.9 mmol/L


(3.5-5.1)


 


Chloride Level


 


 


 


 111 mmol/L


()


 


Carbon Dioxide Level


 


 


 


 28 mmol/L


(21-32)


 


Anion Gap    11 (6-14) 


 


Blood Urea Nitrogen


 


 


 


 63 mg/dL


(7-20)


 


Creatinine


 


 


 


 0.9 mg/dL


(0.6-1.0)


 


Estimated GFR


(Cockcroft-Gault) 


 


 


 66.5 





 


Glucose Level


 


 


 


 162 mg/dL


(70-99)


 


Calcium Level


 


 


 


 8.8 mg/dL


(8.5-10.1)


 


Phosphorus Level


 


 


 


 4.7 mg/dL


(2.6-4.7)


 


Magnesium Level


 


 


 


 2.0 mg/dL


(1.8-2.4)


 


Iron Level


 


 


 


 23 ug/dL


()


 


Total Iron Binding Capacity


 


 


 


 76 ug/dL


(250-450)


 


Iron Saturation    30 % (15-34) 











Micro


Micro





Microbiology


4/15/20 Aerobic and Anaerobic Culture - Final, Complete


          


4/15/20 Anaerobic Culture Result 1 (ANSON) - Final, Complete


          


4/15/20 Aerobic Culture - Final, Complete


          


4/15/20 Aerobic Culture Result 1 (ANSON) - Final, Complete


          


4/15/20 Gram Stain - Final, Complete


          


4/15/20 Gram Stain Result 1 (ANSON) - Final, Complete


          


4/15/20 Gram Stain Result 2 (ANSON) - Final, Complete


          


4/14/20 Blood Culture - Final, Complete


          NO GROWTH AFTER 5 DAYS


4/12/20 Urine Culture - Final, Complete


          


4/12/20 Urine Culture Result 1 (ANSON) - Final, Complete





Review of Systems


Constitutional:  yes: other (ON THE VENT)





Physical Exam


General Appearance:  other (ON THE VENT, TRACH)


Skin:  warm


Respiratory:  decreased breath sounds


Heart:  S1S2


Abdomen:  soft, bowel sounds present


Genitourinary:  bladder flat


Extremities:  pulses present, edema


Neurology:  alert, oriented, other





Assessment


Assessment


IMP





HYPERNATREMIA


JED-ATN-MUCH IMPROVED AND OFF HD FOR NOW


ANEMIA


LEUCOCYTOSIS-RESOLVED


ANASARCA DUE TO 3RD SPACING


HYPERKALEMIA-RESOLVED


ACIDOSIS AND ACIDEMIA-RESOLVED


ACUTE REPS FAILURE-TRACH


ACUTE PANCREATITIS


HYPOALBUMINEMIA








PLAN





NO HD FOR NOW


TPN TO CONTINUE AS NEEDED


PRBC AS NEEDED


IV VENOFER


START YOLI IF NEEDED


VENT SUPPORT


IV LASIX


ANTIBIOTICS


WILL NEED LTAC


UPDATED FAMILY


WILL FOLLOW











BETH HEIN MD                 Apr 28, 2020 12:09

## 2020-04-29 VITALS — SYSTOLIC BLOOD PRESSURE: 118 MMHG | DIASTOLIC BLOOD PRESSURE: 59 MMHG

## 2020-04-29 VITALS — DIASTOLIC BLOOD PRESSURE: 56 MMHG | SYSTOLIC BLOOD PRESSURE: 107 MMHG

## 2020-04-29 VITALS — DIASTOLIC BLOOD PRESSURE: 67 MMHG | SYSTOLIC BLOOD PRESSURE: 137 MMHG

## 2020-04-29 VITALS — SYSTOLIC BLOOD PRESSURE: 123 MMHG | DIASTOLIC BLOOD PRESSURE: 54 MMHG

## 2020-04-29 VITALS — DIASTOLIC BLOOD PRESSURE: 46 MMHG | SYSTOLIC BLOOD PRESSURE: 98 MMHG

## 2020-04-29 VITALS — SYSTOLIC BLOOD PRESSURE: 99 MMHG | DIASTOLIC BLOOD PRESSURE: 55 MMHG

## 2020-04-29 VITALS — DIASTOLIC BLOOD PRESSURE: 57 MMHG | SYSTOLIC BLOOD PRESSURE: 126 MMHG

## 2020-04-29 VITALS — DIASTOLIC BLOOD PRESSURE: 51 MMHG | SYSTOLIC BLOOD PRESSURE: 105 MMHG

## 2020-04-29 VITALS — DIASTOLIC BLOOD PRESSURE: 70 MMHG | SYSTOLIC BLOOD PRESSURE: 130 MMHG

## 2020-04-29 VITALS — DIASTOLIC BLOOD PRESSURE: 68 MMHG | SYSTOLIC BLOOD PRESSURE: 159 MMHG

## 2020-04-29 VITALS — SYSTOLIC BLOOD PRESSURE: 137 MMHG | DIASTOLIC BLOOD PRESSURE: 85 MMHG

## 2020-04-29 VITALS — SYSTOLIC BLOOD PRESSURE: 140 MMHG | DIASTOLIC BLOOD PRESSURE: 86 MMHG

## 2020-04-29 VITALS — DIASTOLIC BLOOD PRESSURE: 67 MMHG | SYSTOLIC BLOOD PRESSURE: 197 MMHG

## 2020-04-29 VITALS — DIASTOLIC BLOOD PRESSURE: 52 MMHG | SYSTOLIC BLOOD PRESSURE: 95 MMHG

## 2020-04-29 VITALS — DIASTOLIC BLOOD PRESSURE: 52 MMHG | SYSTOLIC BLOOD PRESSURE: 105 MMHG

## 2020-04-29 VITALS — SYSTOLIC BLOOD PRESSURE: 123 MMHG | DIASTOLIC BLOOD PRESSURE: 65 MMHG

## 2020-04-29 VITALS — DIASTOLIC BLOOD PRESSURE: 56 MMHG | SYSTOLIC BLOOD PRESSURE: 101 MMHG

## 2020-04-29 VITALS — SYSTOLIC BLOOD PRESSURE: 90 MMHG | DIASTOLIC BLOOD PRESSURE: 61 MMHG

## 2020-04-29 VITALS — SYSTOLIC BLOOD PRESSURE: 113 MMHG | DIASTOLIC BLOOD PRESSURE: 56 MMHG

## 2020-04-29 VITALS — DIASTOLIC BLOOD PRESSURE: 74 MMHG | SYSTOLIC BLOOD PRESSURE: 155 MMHG

## 2020-04-29 VITALS — SYSTOLIC BLOOD PRESSURE: 105 MMHG | DIASTOLIC BLOOD PRESSURE: 56 MMHG

## 2020-04-29 VITALS — DIASTOLIC BLOOD PRESSURE: 53 MMHG | SYSTOLIC BLOOD PRESSURE: 91 MMHG

## 2020-04-29 VITALS — DIASTOLIC BLOOD PRESSURE: 54 MMHG | SYSTOLIC BLOOD PRESSURE: 109 MMHG

## 2020-04-29 VITALS — DIASTOLIC BLOOD PRESSURE: 50 MMHG | SYSTOLIC BLOOD PRESSURE: 119 MMHG

## 2020-04-29 LAB
ANION GAP SERPL CALC-SCNC: 8 MMOL/L (ref 6–14)
BUN SERPL-MCNC: 58 MG/DL (ref 7–20)
CALCIUM SERPL-MCNC: 8.2 MG/DL (ref 8.5–10.1)
CHLORIDE SERPL-SCNC: 114 MMOL/L (ref 98–107)
CO2 SERPL-SCNC: 29 MMOL/L (ref 21–32)
CREAT SERPL-MCNC: 1 MG/DL (ref 0.6–1)
ERYTHROCYTE [DISTWIDTH] IN BLOOD BY AUTOMATED COUNT: 18.4 % (ref 11.5–14.5)
ERYTHROCYTE [DISTWIDTH] IN BLOOD BY AUTOMATED COUNT: 18.5 % (ref 11.5–14.5)
GFR SERPLBLD BASED ON 1.73 SQ M-ARVRAT: 58.9 ML/MIN
GLUCOSE SERPL-MCNC: 104 MG/DL (ref 70–99)
HCT VFR BLD CALC: 22.9 % (ref 36–47)
HCT VFR BLD CALC: 26.9 % (ref 36–47)
HGB BLD-MCNC: 7.4 G/DL (ref 12–15.5)
HGB BLD-MCNC: 8.5 G/DL (ref 12–15.5)
MCH RBC QN AUTO: 29 PG (ref 25–35)
MCH RBC QN AUTO: 29 PG (ref 25–35)
MCHC RBC AUTO-ENTMCNC: 32 G/DL (ref 31–37)
MCHC RBC AUTO-ENTMCNC: 32 G/DL (ref 31–37)
MCV RBC AUTO: 91 FL (ref 79–100)
MCV RBC AUTO: 93 FL (ref 79–100)
PLATELET # BLD AUTO: 439 X10^3/UL (ref 140–400)
PLATELET # BLD AUTO: 446 X10^3/UL (ref 140–400)
POTASSIUM SERPL-SCNC: 3.6 MMOL/L (ref 3.5–5.1)
RBC # BLD AUTO: 2.51 X10^6/UL (ref 3.5–5.4)
RBC # BLD AUTO: 2.89 X10^6/UL (ref 3.5–5.4)
SODIUM SERPL-SCNC: 151 MMOL/L (ref 136–145)
WBC # BLD AUTO: 10.4 X10^3/UL (ref 4–11)
WBC # BLD AUTO: 14.5 X10^3/UL (ref 4–11)

## 2020-04-29 RX ADMIN — MEROPENEM SCH MLS/HR: 1 INJECTION, POWDER, FOR SOLUTION INTRAVENOUS at 14:53

## 2020-04-29 RX ADMIN — PROCHLORPERAZINE EDISYLATE PRN MG: 5 INJECTION INTRAMUSCULAR; INTRAVENOUS at 07:40

## 2020-04-29 RX ADMIN — MEROPENEM SCH MLS/HR: 1 INJECTION, POWDER, FOR SOLUTION INTRAVENOUS at 05:23

## 2020-04-29 RX ADMIN — PROCHLORPERAZINE EDISYLATE PRN MG: 5 INJECTION INTRAMUSCULAR; INTRAVENOUS at 14:59

## 2020-04-29 RX ADMIN — DEXMEDETOMIDINE HYDROCHLORIDE PRN MLS/HR: 100 INJECTION, SOLUTION, CONCENTRATE INTRAVENOUS at 09:22

## 2020-04-29 RX ADMIN — PANTOPRAZOLE SODIUM SCH MG: 40 INJECTION, POWDER, FOR SOLUTION INTRAVENOUS at 07:39

## 2020-04-29 RX ADMIN — HEPARIN SODIUM SCH UNIT: 5000 INJECTION, SOLUTION INTRAVENOUS; SUBCUTANEOUS at 22:20

## 2020-04-29 RX ADMIN — DEXMEDETOMIDINE HYDROCHLORIDE PRN MLS/HR: 100 INJECTION, SOLUTION, CONCENTRATE INTRAVENOUS at 15:30

## 2020-04-29 RX ADMIN — INSULIN LISPRO SCH UNITS: 100 INJECTION, SOLUTION INTRAVENOUS; SUBCUTANEOUS at 12:00

## 2020-04-29 RX ADMIN — DEXMEDETOMIDINE HYDROCHLORIDE PRN MLS/HR: 100 INJECTION, SOLUTION, CONCENTRATE INTRAVENOUS at 01:52

## 2020-04-29 RX ADMIN — MEROPENEM SCH MLS/HR: 1 INJECTION, POWDER, FOR SOLUTION INTRAVENOUS at 22:22

## 2020-04-29 RX ADMIN — DEXMEDETOMIDINE HYDROCHLORIDE PRN MLS/HR: 100 INJECTION, SOLUTION, CONCENTRATE INTRAVENOUS at 21:45

## 2020-04-29 RX ADMIN — DEXMEDETOMIDINE HYDROCHLORIDE PRN MLS/HR: 100 INJECTION, SOLUTION, CONCENTRATE INTRAVENOUS at 12:49

## 2020-04-29 RX ADMIN — INSULIN LISPRO SCH UNITS: 100 INJECTION, SOLUTION INTRAVENOUS; SUBCUTANEOUS at 06:00

## 2020-04-29 RX ADMIN — HEPARIN SODIUM SCH UNIT: 5000 INJECTION, SOLUTION INTRAVENOUS; SUBCUTANEOUS at 08:44

## 2020-04-29 RX ADMIN — BUMETANIDE SCH MG: 0.25 INJECTION INTRAMUSCULAR; INTRAVENOUS at 08:42

## 2020-04-29 RX ADMIN — Medication PRN EACH: at 13:50

## 2020-04-29 RX ADMIN — DEXMEDETOMIDINE HYDROCHLORIDE PRN MLS/HR: 100 INJECTION, SOLUTION, CONCENTRATE INTRAVENOUS at 18:05

## 2020-04-29 RX ADMIN — BUMETANIDE SCH MG: 0.25 INJECTION INTRAMUSCULAR; INTRAVENOUS at 14:53

## 2020-04-29 RX ADMIN — Medication PRN MLS/HR: at 11:28

## 2020-04-29 RX ADMIN — DEXTROSE SCH MLS/HR: 50 INJECTION, SOLUTION INTRAVENOUS at 08:43

## 2020-04-29 RX ADMIN — DAPTOMYCIN SCH MLS/HR: 500 INJECTION, POWDER, LYOPHILIZED, FOR SOLUTION INTRAVENOUS at 12:26

## 2020-04-29 RX ADMIN — DEXMEDETOMIDINE HYDROCHLORIDE PRN MLS/HR: 100 INJECTION, SOLUTION, CONCENTRATE INTRAVENOUS at 05:56

## 2020-04-29 RX ADMIN — INSULIN LISPRO SCH UNITS: 100 INJECTION, SOLUTION INTRAVENOUS; SUBCUTANEOUS at 17:52

## 2020-04-29 NOTE — PDOC
SURGICAL PROGRESS NOTE


Subjective


Pt awake on vent


Vital Signs





Vital Signs








  Date Time  Temp Pulse Resp B/P (MAP) Pulse Ox O2 Delivery O2 Flow Rate FiO2


 


4/29/20 10:00  122 21 105/51 (69) 99 Ventilator  


 


4/29/20 08:00 99.3       





 99.3       


 


4/28/20 14:50       8.0 








I&O











Intake and Output 


 


 4/29/20





 07:00


 


Intake Total 3305.5 ml


 


Output Total 3065 ml


 


Balance 240.5 ml


 


 


 


IV Total 3305.5 ml


 


Output Urine Total 2960 ml


 


Drainage Total 105 ml








General:  Alert, Cooperative, No acute distress


Abdomen:  Soft, No tenderness, Other (ROBERT serous)


Labs





Laboratory Tests








Test


 4/27/20


13:40 4/27/20


18:04 4/28/20


00:57 4/28/20


07:00


 


Glucose (Fingerstick)


 163 mg/dL


(70-99) 208 mg/dL


(70-99) 208 mg/dL


(70-99) 





 


White Blood Count


 


 


 


 6.3 x10^3/uL


(4.0-11.0)


 


Red Blood Count


 


 


 


 4.12 x10^6/uL


(3.50-5.40)


 


Hemoglobin


 


 


 


 12.1 g/dL


(12.0-15.5)


 


Hematocrit


 


 


 


 37.5 %


(36.0-47.0)


 


Mean Corpuscular Volume    91 fL () 


 


Mean Corpuscular Hemoglobin    29 pg (25-35) 


 


Mean Corpuscular Hemoglobin


Concent 


 


 


 32 g/dL


(31-37)


 


Red Cell Distribution Width


 


 


 


 18.3 %


(11.5-14.5)


 


Platelet Count


 


 


 


 267 x10^3/uL


(140-400)


 


Neutrophils (%) (Auto)    74 % (31-73) 


 


Lymphocytes (%) (Auto)    20 % (24-48) 


 


Monocytes (%) (Auto)    5 % (0-9) 


 


Eosinophils (%) (Auto)    0 % (0-3) 


 


Basophils (%) (Auto)    0 % (0-3) 


 


Neutrophils # (Auto)


 


 


 


 4.7 x10^3/uL


(1.8-7.7)


 


Lymphocytes # (Auto)


 


 


 


 1.3 x10^3/uL


(1.0-4.8)


 


Monocytes # (Auto)


 


 


 


 0.3 x10^3/uL


(0.0-1.1)


 


Eosinophils # (Auto)


 


 


 


 0.0 x10^3/uL


(0.0-0.7)


 


Basophils # (Auto)


 


 


 


 0.0 x10^3/uL


(0.0-0.2)


 


Sodium Level


 


 


 


 150 mmol/L


(136-145)


 


Potassium Level


 


 


 


 3.9 mmol/L


(3.5-5.1)


 


Chloride Level


 


 


 


 111 mmol/L


()


 


Carbon Dioxide Level


 


 


 


 28 mmol/L


(21-32)


 


Anion Gap    11 (6-14) 


 


Blood Urea Nitrogen


 


 


 


 63 mg/dL


(7-20)


 


Creatinine


 


 


 


 0.9 mg/dL


(0.6-1.0)


 


Estimated GFR


(Cockcroft-Gault) 


 


 


 66.5 





 


Glucose Level


 


 


 


 162 mg/dL


(70-99)


 


Calcium Level


 


 


 


 8.8 mg/dL


(8.5-10.1)


 


Phosphorus Level


 


 


 


 4.7 mg/dL


(2.6-4.7)


 


Magnesium Level


 


 


 


 2.0 mg/dL


(1.8-2.4)


 


Iron Level


 


 


 


 23 ug/dL


()


 


Total Iron Binding Capacity


 


 


 


 76 ug/dL


(250-450)


 


Iron Saturation    30 % (15-34) 


 


Test


 4/28/20


12:08 4/28/20


18:44 4/28/20


23:40 4/29/20


06:10


 


Glucose (Fingerstick)


 148 mg/dL


(70-99) 116 mg/dL


(70-99) 94 mg/dL


(70-99) 





 


White Blood Count


 


 


 


 10.4 x10^3/uL


(4.0-11.0)


 


Red Blood Count


 


 


 


 2.51 x10^6/uL


(3.50-5.40)


 


Hemoglobin


 


 


 


 7.4 g/dL


(12.0-15.5)


 


Hematocrit


 


 


 


 22.9 %


(36.0-47.0)


 


Mean Corpuscular Volume    91 fL () 


 


Mean Corpuscular Hemoglobin    29 pg (25-35) 


 


Mean Corpuscular Hemoglobin


Concent 


 


 


 32 g/dL


(31-37)


 


Red Cell Distribution Width


 


 


 


 18.4 %


(11.5-14.5)


 


Platelet Count


 


 


 


 439 x10^3/uL


(140-400)


 


Sodium Level


 


 


 


 151 mmol/L


(136-145)


 


Potassium Level


 


 


 


 3.6 mmol/L


(3.5-5.1)


 


Chloride Level


 


 


 


 114 mmol/L


()


 


Carbon Dioxide Level


 


 


 


 29 mmol/L


(21-32)


 


Anion Gap    8 (6-14) 


 


Blood Urea Nitrogen


 


 


 


 58 mg/dL


(7-20)


 


Creatinine


 


 


 


 1.0 mg/dL


(0.6-1.0)


 


Estimated GFR


(Cockcroft-Gault) 


 


 


 58.9 





 


Glucose Level


 


 


 


 104 mg/dL


(70-99)


 


Calcium Level


 


 


 


 8.2 mg/dL


(8.5-10.1)


 


Test


 4/29/20


06:14 4/29/20


08:00 


 





 


Glucose (Fingerstick)


 87 mg/dL


(70-99) 


 


 





 


White Blood Count


 


 14.5 x10^3/uL


(4.0-11.0) 


 





 


Red Blood Count


 


 2.89 x10^6/uL


(3.50-5.40) 


 





 


Hemoglobin


 


 8.5 g/dL


(12.0-15.5) 


 





 


Hematocrit


 


 26.9 %


(36.0-47.0) 


 





 


Mean Corpuscular Volume  93 fL ()   


 


Mean Corpuscular Hemoglobin  29 pg (25-35)   


 


Mean Corpuscular Hemoglobin


Concent 


 32 g/dL


(31-37) 


 





 


Red Cell Distribution Width


 


 18.5 %


(11.5-14.5) 


 





 


Platelet Count


 


 446 x10^3/uL


(140-400) 


 











Laboratory Tests








Test


 4/28/20


12:08 4/28/20


18:44 4/28/20


23:40 4/29/20


06:10


 


Glucose (Fingerstick)


 148 mg/dL


(70-99) 116 mg/dL


(70-99) 94 mg/dL


(70-99) 





 


White Blood Count


 


 


 


 10.4 x10^3/uL


(4.0-11.0)


 


Red Blood Count


 


 


 


 2.51 x10^6/uL


(3.50-5.40)


 


Hemoglobin


 


 


 


 7.4 g/dL


(12.0-15.5)


 


Hematocrit


 


 


 


 22.9 %


(36.0-47.0)


 


Mean Corpuscular Volume    91 fL () 


 


Mean Corpuscular Hemoglobin    29 pg (25-35) 


 


Mean Corpuscular Hemoglobin


Concent 


 


 


 32 g/dL


(31-37)


 


Red Cell Distribution Width


 


 


 


 18.4 %


(11.5-14.5)


 


Platelet Count


 


 


 


 439 x10^3/uL


(140-400)


 


Sodium Level


 


 


 


 151 mmol/L


(136-145)


 


Potassium Level


 


 


 


 3.6 mmol/L


(3.5-5.1)


 


Chloride Level


 


 


 


 114 mmol/L


()


 


Carbon Dioxide Level


 


 


 


 29 mmol/L


(21-32)


 


Anion Gap    8 (6-14) 


 


Blood Urea Nitrogen


 


 


 


 58 mg/dL


(7-20)


 


Creatinine


 


 


 


 1.0 mg/dL


(0.6-1.0)


 


Estimated GFR


(Cockcroft-Gault) 


 


 


 58.9 





 


Glucose Level


 


 


 


 104 mg/dL


(70-99)


 


Calcium Level


 


 


 


 8.2 mg/dL


(8.5-10.1)


 


Test


 4/29/20


06:14 4/29/20


08:00 


 





 


Glucose (Fingerstick)


 87 mg/dL


(70-99) 


 


 





 


White Blood Count


 


 14.5 x10^3/uL


(4.0-11.0) 


 





 


Red Blood Count


 


 2.89 x10^6/uL


(3.50-5.40) 


 





 


Hemoglobin


 


 8.5 g/dL


(12.0-15.5) 


 





 


Hematocrit


 


 26.9 %


(36.0-47.0) 


 





 


Mean Corpuscular Volume  93 fL ()   


 


Mean Corpuscular Hemoglobin  29 pg (25-35)   


 


Mean Corpuscular Hemoglobin


Concent 


 32 g/dL


(31-37) 


 





 


Red Cell Distribution Width


 


 18.5 %


(11.5-14.5) 


 





 


Platelet Count


 


 446 x10^3/uL


(140-400) 


 











Problem List


Problems


Medical Problems:


(1) Acute pancreatitis


Status: Acute  





(2) Cholelithiasis


Status: Acute  








Assessment/Plan


s/p lap exp


cont drain and supportive care


cholecystectomy in 3 months.











GAMAL ZHOU MD             Apr 29, 2020 11:11

## 2020-04-29 NOTE — PDOC
PULMONARY PROGRESS NOTES


Subjective


Patient intubated on 3/23 , s/p trach 4/6, on vent


Taken to the OR 4/27, NO SURGERY DONE / NOT A CANDIDATE


DID TS FOR 2 HRS YESTERDAY


Vitals





Vital Signs








  Date Time  Temp Pulse Resp B/P (MAP) Pulse Ox O2 Delivery O2 Flow Rate FiO2


 


4/29/20 08:00      Mechanical Ventilator  


 


4/29/20 08:00 99.3 86 17 140/86 (104) 98   





 99.3       


 


4/28/20 14:50       8.0 








Comments


ros unable to obtain  on vent


General:  Alert


HEENT:  Other (nc at perrl   nose clear  nech  trach site ok  no lad   no 

thyromegaly)


Lungs:  Crackles, Other (dimished in BLL  nc at perrl   nose clear   neck trach 

site ok   no lad  no thyromegaly)


Cardiovascular:  S1, S2


Abdomen:  Soft, Non-tender, Other (distended)


Neuro Exam:  Alert


Extremities:  Other (+3 generalized edema )


Skin:  Warm, Dry


Labs





Laboratory Tests








Test


 4/27/20


13:40 4/27/20


18:04 4/28/20


00:57 4/28/20


07:00


 


Glucose (Fingerstick)


 163 mg/dL


(70-99) 208 mg/dL


(70-99) 208 mg/dL


(70-99) 





 


White Blood Count


 


 


 


 6.3 x10^3/uL


(4.0-11.0)


 


Red Blood Count


 


 


 


 4.12 x10^6/uL


(3.50-5.40)


 


Hemoglobin


 


 


 


 12.1 g/dL


(12.0-15.5)


 


Hematocrit


 


 


 


 37.5 %


(36.0-47.0)


 


Mean Corpuscular Volume    91 fL () 


 


Mean Corpuscular Hemoglobin    29 pg (25-35) 


 


Mean Corpuscular Hemoglobin


Concent 


 


 


 32 g/dL


(31-37)


 


Red Cell Distribution Width


 


 


 


 18.3 %


(11.5-14.5)


 


Platelet Count


 


 


 


 267 x10^3/uL


(140-400)


 


Neutrophils (%) (Auto)    74 % (31-73) 


 


Lymphocytes (%) (Auto)    20 % (24-48) 


 


Monocytes (%) (Auto)    5 % (0-9) 


 


Eosinophils (%) (Auto)    0 % (0-3) 


 


Basophils (%) (Auto)    0 % (0-3) 


 


Neutrophils # (Auto)


 


 


 


 4.7 x10^3/uL


(1.8-7.7)


 


Lymphocytes # (Auto)


 


 


 


 1.3 x10^3/uL


(1.0-4.8)


 


Monocytes # (Auto)


 


 


 


 0.3 x10^3/uL


(0.0-1.1)


 


Eosinophils # (Auto)


 


 


 


 0.0 x10^3/uL


(0.0-0.7)


 


Basophils # (Auto)


 


 


 


 0.0 x10^3/uL


(0.0-0.2)


 


Sodium Level


 


 


 


 150 mmol/L


(136-145)


 


Potassium Level


 


 


 


 3.9 mmol/L


(3.5-5.1)


 


Chloride Level


 


 


 


 111 mmol/L


()


 


Carbon Dioxide Level


 


 


 


 28 mmol/L


(21-32)


 


Anion Gap    11 (6-14) 


 


Blood Urea Nitrogen


 


 


 


 63 mg/dL


(7-20)


 


Creatinine


 


 


 


 0.9 mg/dL


(0.6-1.0)


 


Estimated GFR


(Cockcroft-Gault) 


 


 


 66.5 





 


Glucose Level


 


 


 


 162 mg/dL


(70-99)


 


Calcium Level


 


 


 


 8.8 mg/dL


(8.5-10.1)


 


Phosphorus Level


 


 


 


 4.7 mg/dL


(2.6-4.7)


 


Magnesium Level


 


 


 


 2.0 mg/dL


(1.8-2.4)


 


Iron Level


 


 


 


 23 ug/dL


()


 


Total Iron Binding Capacity


 


 


 


 76 ug/dL


(250-450)


 


Iron Saturation    30 % (15-34) 


 


Test


 4/28/20


12:08 4/28/20


18:44 4/28/20


23:40 4/29/20


06:10


 


Glucose (Fingerstick)


 148 mg/dL


(70-99) 116 mg/dL


(70-99) 94 mg/dL


(70-99) 





 


White Blood Count


 


 


 


 10.4 x10^3/uL


(4.0-11.0)


 


Red Blood Count


 


 


 


 2.51 x10^6/uL


(3.50-5.40)


 


Hemoglobin


 


 


 


 7.4 g/dL


(12.0-15.5)


 


Hematocrit


 


 


 


 22.9 %


(36.0-47.0)


 


Mean Corpuscular Volume    91 fL () 


 


Mean Corpuscular Hemoglobin    29 pg (25-35) 


 


Mean Corpuscular Hemoglobin


Concent 


 


 


 32 g/dL


(31-37)


 


Red Cell Distribution Width


 


 


 


 18.4 %


(11.5-14.5)


 


Platelet Count


 


 


 


 439 x10^3/uL


(140-400)


 


Sodium Level


 


 


 


 151 mmol/L


(136-145)


 


Potassium Level


 


 


 


 3.6 mmol/L


(3.5-5.1)


 


Chloride Level


 


 


 


 114 mmol/L


()


 


Carbon Dioxide Level


 


 


 


 29 mmol/L


(21-32)


 


Anion Gap    8 (6-14) 


 


Blood Urea Nitrogen


 


 


 


 58 mg/dL


(7-20)


 


Creatinine


 


 


 


 1.0 mg/dL


(0.6-1.0)


 


Estimated GFR


(Cockcroft-Gault) 


 


 


 58.9 





 


Glucose Level


 


 


 


 104 mg/dL


(70-99)


 


Calcium Level


 


 


 


 8.2 mg/dL


(8.5-10.1)


 


Test


 4/29/20


06:14 4/29/20


08:00 


 





 


Glucose (Fingerstick)


 87 mg/dL


(70-99) 


 


 





 


White Blood Count


 


 14.5 x10^3/uL


(4.0-11.0) 


 





 


Red Blood Count


 


 2.89 x10^6/uL


(3.50-5.40) 


 





 


Hemoglobin


 


 8.5 g/dL


(12.0-15.5) 


 





 


Hematocrit


 


 26.9 %


(36.0-47.0) 


 





 


Mean Corpuscular Volume  93 fL ()   


 


Mean Corpuscular Hemoglobin  29 pg (25-35)   


 


Mean Corpuscular Hemoglobin


Concent 


 32 g/dL


(31-37) 


 





 


Red Cell Distribution Width


 


 18.5 %


(11.5-14.5) 


 





 


Platelet Count


 


 446 x10^3/uL


(140-400) 


 











Laboratory Tests








Test


 4/28/20


12:08 4/28/20


18:44 4/28/20


23:40 4/29/20


06:10


 


Glucose (Fingerstick)


 148 mg/dL


(70-99) 116 mg/dL


(70-99) 94 mg/dL


(70-99) 





 


White Blood Count


 


 


 


 10.4 x10^3/uL


(4.0-11.0)


 


Red Blood Count


 


 


 


 2.51 x10^6/uL


(3.50-5.40)


 


Hemoglobin


 


 


 


 7.4 g/dL


(12.0-15.5)


 


Hematocrit


 


 


 


 22.9 %


(36.0-47.0)


 


Mean Corpuscular Volume    91 fL () 


 


Mean Corpuscular Hemoglobin    29 pg (25-35) 


 


Mean Corpuscular Hemoglobin


Concent 


 


 


 32 g/dL


(31-37)


 


Red Cell Distribution Width


 


 


 


 18.4 %


(11.5-14.5)


 


Platelet Count


 


 


 


 439 x10^3/uL


(140-400)


 


Sodium Level


 


 


 


 151 mmol/L


(136-145)


 


Potassium Level


 


 


 


 3.6 mmol/L


(3.5-5.1)


 


Chloride Level


 


 


 


 114 mmol/L


()


 


Carbon Dioxide Level


 


 


 


 29 mmol/L


(21-32)


 


Anion Gap    8 (6-14) 


 


Blood Urea Nitrogen


 


 


 


 58 mg/dL


(7-20)


 


Creatinine


 


 


 


 1.0 mg/dL


(0.6-1.0)


 


Estimated GFR


(Cockcroft-Gault) 


 


 


 58.9 





 


Glucose Level


 


 


 


 104 mg/dL


(70-99)


 


Calcium Level


 


 


 


 8.2 mg/dL


(8.5-10.1)


 


Test


 4/29/20


06:14 4/29/20


08:00 


 





 


Glucose (Fingerstick)


 87 mg/dL


(70-99) 


 


 





 


White Blood Count


 


 14.5 x10^3/uL


(4.0-11.0) 


 





 


Red Blood Count


 


 2.89 x10^6/uL


(3.50-5.40) 


 





 


Hemoglobin


 


 8.5 g/dL


(12.0-15.5) 


 





 


Hematocrit


 


 26.9 %


(36.0-47.0) 


 





 


Mean Corpuscular Volume  93 fL ()   


 


Mean Corpuscular Hemoglobin  29 pg (25-35)   


 


Mean Corpuscular Hemoglobin


Concent 


 32 g/dL


(31-37) 


 





 


Red Cell Distribution Width


 


 18.5 %


(11.5-14.5) 


 





 


Platelet Count


 


 446 x10^3/uL


(140-400) 


 











Medications





Active Scripts








 Medications  Dose


 Route/Sig


 Max Daily Dose Days Date Category


 


 Bisoprolol


 Fumarate 5 Mg


 Tablet  10 Mg


 PO DAILY


   3/16/20 Reported








Comments


cxr reviewed.





Impression


.


IMPRESSION:


1.  Acute hypoxemic respiratory failure secondary to ARDS status post trach,


2.  Gallstone pancreatitis


3.  Severe metabolic acidosis.stable


4.  Acute kidney injury-stable, ON HD-- continue to improve 


5.  Acute gallstone pancreatitis.


6.  Hypoalbuminemia.


7.  Moderate persistent effusions


8.  Fever-  Per ID, per surgery


9.  Chronic anemia


10. Covid 19 testing negative


11. Moderate to large ascites-S/P paracentisis


S/P paracentisis with 4 liters removed on 4/15/20














Surgery note


Operative Note:


After obtaining informed consent, patient was taken to OR, induced under GETA 

and prepped in the usual fashion.  5 mm port placed umbilical and right mid 

abdomen, all under laparoscopic guidance.  Large amount of ascites encountered 

and aspirated off.  Fluid was clear with some whitish debris.  Viscera was 

completely locked in with obliteration of all planes, preventing any significant

exploration.  Copious irrigation.





Given patient's overall clinical improvement, favor against open procedure and 

attempt at cholecystectomy and/or necrosectomy, given high risk of com

plications.  Cholecystectomy will need to be performed, but favor waiting 3 

months.





19 ROBERT drain placed and secured with 3 0 nylon.  Skin repaired with 4 0 monocryl.

 Dressing placed.





Patient tolerated procedure well and sent to PACU in stable condition.  All 

counts correct.  Wound class is 4.





Plan


.


We will continue our efforts at weaning


Resume TS trials, extend duration


precedex for anxiety


Follow surgery input


hep sq and protonix for prophylaxis


Follow ID rec, abx per id


Follow nephrology recs 


Nutritional support per surgery


continue TPN for nutrition 


DVT/GI PPX 


d/w RN/RT











SHARYN SOLORZANO MD                 Apr 29, 2020 10:07

## 2020-04-29 NOTE — PDOC
G I PROGRESS NOTE


Subjective


Awake, alert, on ventilator/trached.  No new complaints.  Appears in reasonably 

good spirits.


Objective


Hemoglobin down; no overt bleeding noted by staff.


Physical Exam


Lungs clear.


RRR


Abdomen somewhat distended, but softer since laparoscopy.


Review of Relevant


I have reviewed the following items josy (where applicable) has been applied.


Labs





Laboratory Tests








Test


 4/27/20


13:40 4/27/20


18:04 4/28/20


00:57 4/28/20


07:00


 


Glucose (Fingerstick)


 163 mg/dL


(70-99) 208 mg/dL


(70-99) 208 mg/dL


(70-99) 





 


White Blood Count


 


 


 


 6.3 x10^3/uL


(4.0-11.0)


 


Red Blood Count


 


 


 


 4.12 x10^6/uL


(3.50-5.40)


 


Hemoglobin


 


 


 


 12.1 g/dL


(12.0-15.5)


 


Hematocrit


 


 


 


 37.5 %


(36.0-47.0)


 


Mean Corpuscular Volume    91 fL () 


 


Mean Corpuscular Hemoglobin    29 pg (25-35) 


 


Mean Corpuscular Hemoglobin


Concent 


 


 


 32 g/dL


(31-37)


 


Red Cell Distribution Width


 


 


 


 18.3 %


(11.5-14.5)


 


Platelet Count


 


 


 


 267 x10^3/uL


(140-400)


 


Neutrophils (%) (Auto)    74 % (31-73) 


 


Lymphocytes (%) (Auto)    20 % (24-48) 


 


Monocytes (%) (Auto)    5 % (0-9) 


 


Eosinophils (%) (Auto)    0 % (0-3) 


 


Basophils (%) (Auto)    0 % (0-3) 


 


Neutrophils # (Auto)


 


 


 


 4.7 x10^3/uL


(1.8-7.7)


 


Lymphocytes # (Auto)


 


 


 


 1.3 x10^3/uL


(1.0-4.8)


 


Monocytes # (Auto)


 


 


 


 0.3 x10^3/uL


(0.0-1.1)


 


Eosinophils # (Auto)


 


 


 


 0.0 x10^3/uL


(0.0-0.7)


 


Basophils # (Auto)


 


 


 


 0.0 x10^3/uL


(0.0-0.2)


 


Sodium Level


 


 


 


 150 mmol/L


(136-145)


 


Potassium Level


 


 


 


 3.9 mmol/L


(3.5-5.1)


 


Chloride Level


 


 


 


 111 mmol/L


()


 


Carbon Dioxide Level


 


 


 


 28 mmol/L


(21-32)


 


Anion Gap    11 (6-14) 


 


Blood Urea Nitrogen


 


 


 


 63 mg/dL


(7-20)


 


Creatinine


 


 


 


 0.9 mg/dL


(0.6-1.0)


 


Estimated GFR


(Cockcroft-Gault) 


 


 


 66.5 





 


Glucose Level


 


 


 


 162 mg/dL


(70-99)


 


Calcium Level


 


 


 


 8.8 mg/dL


(8.5-10.1)


 


Phosphorus Level


 


 


 


 4.7 mg/dL


(2.6-4.7)


 


Magnesium Level


 


 


 


 2.0 mg/dL


(1.8-2.4)


 


Iron Level


 


 


 


 23 ug/dL


()


 


Total Iron Binding Capacity


 


 


 


 76 ug/dL


(250-450)


 


Iron Saturation    30 % (15-34) 


 


Test


 4/28/20


12:08 4/28/20


18:44 4/28/20


23:40 4/29/20


06:10


 


Glucose (Fingerstick)


 148 mg/dL


(70-99) 116 mg/dL


(70-99) 94 mg/dL


(70-99) 





 


White Blood Count


 


 


 


 10.4 x10^3/uL


(4.0-11.0)


 


Red Blood Count


 


 


 


 2.51 x10^6/uL


(3.50-5.40)


 


Hemoglobin


 


 


 


 7.4 g/dL


(12.0-15.5)


 


Hematocrit


 


 


 


 22.9 %


(36.0-47.0)


 


Mean Corpuscular Volume    91 fL () 


 


Mean Corpuscular Hemoglobin    29 pg (25-35) 


 


Mean Corpuscular Hemoglobin


Concent 


 


 


 32 g/dL


(31-37)


 


Red Cell Distribution Width


 


 


 


 18.4 %


(11.5-14.5)


 


Platelet Count


 


 


 


 439 x10^3/uL


(140-400)


 


Sodium Level


 


 


 


 151 mmol/L


(136-145)


 


Potassium Level


 


 


 


 3.6 mmol/L


(3.5-5.1)


 


Chloride Level


 


 


 


 114 mmol/L


()


 


Carbon Dioxide Level


 


 


 


 29 mmol/L


(21-32)


 


Anion Gap    8 (6-14) 


 


Blood Urea Nitrogen


 


 


 


 58 mg/dL


(7-20)


 


Creatinine


 


 


 


 1.0 mg/dL


(0.6-1.0)


 


Estimated GFR


(Cockcroft-Gault) 


 


 


 58.9 





 


Glucose Level


 


 


 


 104 mg/dL


(70-99)


 


Calcium Level


 


 


 


 8.2 mg/dL


(8.5-10.1)


 


Test


 4/29/20


06:14 4/29/20


08:00 


 





 


Glucose (Fingerstick)


 87 mg/dL


(70-99) 


 


 





 


White Blood Count


 


 14.5 x10^3/uL


(4.0-11.0) 


 





 


Red Blood Count


 


 2.89 x10^6/uL


(3.50-5.40) 


 





 


Hemoglobin


 


 8.5 g/dL


(12.0-15.5) 


 





 


Hematocrit


 


 26.9 %


(36.0-47.0) 


 





 


Mean Corpuscular Volume  93 fL ()   


 


Mean Corpuscular Hemoglobin  29 pg (25-35)   


 


Mean Corpuscular Hemoglobin


Concent 


 32 g/dL


(31-37) 


 





 


Red Cell Distribution Width


 


 18.5 %


(11.5-14.5) 


 





 


Platelet Count


 


 446 x10^3/uL


(140-400) 


 











Laboratory Tests








Test


 4/28/20


12:08 4/28/20


18:44 4/28/20


23:40 4/29/20


06:10


 


Glucose (Fingerstick)


 148 mg/dL


(70-99) 116 mg/dL


(70-99) 94 mg/dL


(70-99) 





 


White Blood Count


 


 


 


 10.4 x10^3/uL


(4.0-11.0)


 


Red Blood Count


 


 


 


 2.51 x10^6/uL


(3.50-5.40)


 


Hemoglobin


 


 


 


 7.4 g/dL


(12.0-15.5)


 


Hematocrit


 


 


 


 22.9 %


(36.0-47.0)


 


Mean Corpuscular Volume    91 fL () 


 


Mean Corpuscular Hemoglobin    29 pg (25-35) 


 


Mean Corpuscular Hemoglobin


Concent 


 


 


 32 g/dL


(31-37)


 


Red Cell Distribution Width


 


 


 


 18.4 %


(11.5-14.5)


 


Platelet Count


 


 


 


 439 x10^3/uL


(140-400)


 


Sodium Level


 


 


 


 151 mmol/L


(136-145)


 


Potassium Level


 


 


 


 3.6 mmol/L


(3.5-5.1)


 


Chloride Level


 


 


 


 114 mmol/L


()


 


Carbon Dioxide Level


 


 


 


 29 mmol/L


(21-32)


 


Anion Gap    8 (6-14) 


 


Blood Urea Nitrogen


 


 


 


 58 mg/dL


(7-20)


 


Creatinine


 


 


 


 1.0 mg/dL


(0.6-1.0)


 


Estimated GFR


(Cockcroft-Gault) 


 


 


 58.9 





 


Glucose Level


 


 


 


 104 mg/dL


(70-99)


 


Calcium Level


 


 


 


 8.2 mg/dL


(8.5-10.1)


 


Test


 4/29/20


06:14 4/29/20


08:00 


 





 


Glucose (Fingerstick)


 87 mg/dL


(70-99) 


 


 





 


White Blood Count


 


 14.5 x10^3/uL


(4.0-11.0) 


 





 


Red Blood Count


 


 2.89 x10^6/uL


(3.50-5.40) 


 





 


Hemoglobin


 


 8.5 g/dL


(12.0-15.5) 


 





 


Hematocrit


 


 26.9 %


(36.0-47.0) 


 





 


Mean Corpuscular Volume  93 fL ()   


 


Mean Corpuscular Hemoglobin  29 pg (25-35)   


 


Mean Corpuscular Hemoglobin


Concent 


 32 g/dL


(31-37) 


 





 


Red Cell Distribution Width


 


 18.5 %


(11.5-14.5) 


 





 


Platelet Count


 


 446 x10^3/uL


(140-400) 


 











Microbiology


4/15/20 Aerobic and Anaerobic Culture - Final, Complete


          


4/15/20 Anaerobic Culture Result 1 ([facility]) - Final, Complete


          


4/15/20 Aerobic Culture - Final, Complete


          


4/15/20 Aerobic Culture Result 1 ([facility]) - Final, Complete


          


4/15/20 Gram Stain - Final, Complete


          


4/15/20 Gram Stain Result 1 ([facility]) - Final, Complete


          


4/15/20 Gram Stain Result 2 ([facility]) - Final, Complete


          


4/14/20 Blood Culture - Final, Complete


          NO GROWTH AFTER 5 DAYS


4/12/20 Urine Culture - Final, Complete


          


4/12/20 Urine Culture Result 1 ([facility]) - Final, Complete


Medications





Current Medications


Sodium Chloride 1,000 ml @  1,000 mls/hr Q1H IV  Last administered on 3/16/20at 

03:00;  Start 3/16/20 at 03:00;  Stop 3/16/20 at 03:59;  Status DC


Ondansetron HCl (Zofran) 4 mg 1X  ONCE IVP  Last administered on 3/16/20at 

03:27;  Start 3/16/20 at 03:00;  Stop 3/16/20 at 03:01;  Status DC


Morphine Sulfate (Morphine Sulfate) 4 mg 1X  ONCE IV ;  Start 3/16/20 at 03:00; 

Stop 3/16/20 at 03:01;  Status Cancel


Ketorolac Tromethamine (Toradol 30mg Vial) 30 mg 1X  ONCE IV  Last administered 

on 3/16/20at 02:54;  Start 3/16/20 at 03:00;  Stop 3/16/20 at 03:01;  Status DC


Fentanyl Citrate (Fentanyl 2ml Vial) 25 mcg 1X  ONCE IVP  Last administered on 

3/16/20at 03:23;  Start 3/16/20 at 03:30;  Stop 3/16/20 at 03:31;  Status DC


Fentanyl Citrate (Fentanyl 2ml Vial) 100 mcg STK-MED ONCE .ROUTE ;  Start 3/16

/20 at 03:18;  Stop 3/16/20 at 03:18;  Status DC


Iohexol (Omnipaque 350 Mg/ml) 90 ml 1X  ONCE IV  Last administered on 3/16/20at 

03:25;  Start 3/16/20 at 03:30;  Stop 3/16/20 at 03:31;  Status DC


Info (CONTRAST GIVEN -- Rx MONITORING) 1 each PRN DAILY  PRN MC SEE COMMENTS;  

Start 3/16/20 at 03:30;  Stop 3/18/20 at 03:29;  Status DC


Hydromorphone HCl (Dilaudid) 0.5 mg 1X  ONCE IV  Last administered on 3/16/20at 

03:55;  Start 3/16/20 at 04:30;  Stop 3/16/20 at 04:32;  Status DC


Ondansetron HCl (Zofran) 4 mg PRN Q8HRS  PRN IV NAUSEA/VOMITING 1ST CHOICE;  

Start 3/16/20 at 05:00;  Stop 3/16/20 at 09:27;  Status DC


Morphine Sulfate (Morphine Sulfate) 2 mg PRN Q2HR  PRN IV SEVERE PAIN 7-10 Last 

administered on 3/17/20at 12:26;  Start 3/16/20 at 05:00;  Stop 3/17/20 at 

14:15;  Status DC


Sodium Chloride 1,000 ml @  125 mls/hr Q8H IV  Last administered on 3/16/20at 

20:56;  Start 3/16/20 at 05:00;  Stop 3/17/20 at 04:59;  Status DC


Hydromorphone HCl (Dilaudid) 0.5 mg PRN Q3HRS  PRN IV SEVERE PAIN 7-10 Last 

administered on 3/17/20at 10:06;  Start 3/16/20 at 05:00;  Stop 3/17/20 at 

12:01;  Status DC


Piperacillin Sod/ Tazobactam Sod 4.5 gm/Sodium Chloride 100 ml @  200 mls/hr 1X 

ONCE IV  Last administered on 3/16/20at 05:44;  Start 3/16/20 at 06:00;  Stop 

3/16/20 at 06:29;  Status DC


Ondansetron HCl (Zofran) 4 mg PRN Q4HRS  PRN IV NAUSEA/VOMITING 1ST CHOICE Last 

administered on 4/24/20at 11:01;  Start 3/16/20 at 09:30


Insulin Human Lispro (HumaLOG) 0-9 UNITS Q6HRS SQ  Last administered on 

4/28/20at 08:42;  Start 3/16/20 at 09:30


Dextrose (Dextrose 50%-Water Syringe) 12.5 gm PRN Q15MIN  PRN IV SEE COMMENTS;  

Start 3/16/20 at 09:30


Pantoprazole Sodium (PROTONIX VIAL for IV PUSH) 40 mg DAILYAC IVP  Last 

administered on 4/29/20at 07:39;  Start 3/16/20 at 11:30


Prochlorperazine Edisylate (Compazine) 10 mg PRN Q6HRS  PRN IV NAUSEA/VOMITING, 

2nd CHOICE Last administered on 4/29/20at 07:40;  Start 3/16/20 at 17:45


Atenolol (Tenormin) 100 mg DAILY PO ;  Start 3/17/20 at 09:00;  Stop 3/16/20 at 

20:08;  Status DC


Metoprolol Tartrate (Lopressor Vial) 2.5 mg Q6HRS IVP  Last administered on 

3/17/20at 05:51;  Start 3/16/20 at 20:15;  Stop 3/17/20 at 10:02;  Status DC


Metoprolol Tartrate (Lopressor Vial) 5 mg Q6HRS IVP  Last administered on 

3/26/20at 00:12;  Start 3/17/20 at 10:15;  Stop 3/28/20 at 08:48;  Status DC


Hydromorphone HCl (Dilaudid) 1 mg PRN Q3HRS  PRN IV SEVERE PAIN 7-10 Last 

administered on 3/23/20at 05:13;  Start 3/17/20 at 12:00;  Stop 3/31/20 at 

00:25;  Status DC


Lidocaine HCl (Buffered Lidocaine 1%) 3 ml STK-MED ONCE .ROUTE ;  Start 3/17/20 

at 12:55;  Stop 3/17/20 at 12:56;  Status DC


Albumin Human 500 ml @  125 mls/hr 1X  ONCE IV  Last administered on 3/17/20at 

14:33;  Start 3/17/20 at 14:30;  Stop 3/17/20 at 18:32;  Status DC


Norepinephrine Bitartrate 8 mg/ Dextrose 258 ml @  17.299 mls/ hr CONT  PRN IV 

PER PROTOCOL Last administered on 4/14/20at 12:48;  Start 3/17/20 at 15:30;  

Stop 4/17/20 at 09:19;  Status DC


Sodium Chloride 1,000 ml @  125 mls/hr Q8H IV  Last administered on 3/17/20at 

21:04;  Start 3/17/20 at 16:00;  Stop 3/18/20 at 02:42;  Status DC


Albumin Human 500 ml @  125 mls/hr PRN BID  PRN IV After every 2L NSS & BP < 

90mm Last administered on 4/1/20at 14:21;  Start 3/17/20 at 16:00


Iohexol (Omnipaque 300 Mg/ml) 60 ml 1X  ONCE IV  Last administered on 3/17/20at 

17:20;  Start 3/17/20 at 17:00;  Stop 3/17/20 at 17:01;  Status DC


Info (CONTRAST GIVEN -- Rx MONITORING) 1 each PRN DAILY  PRN MC SEE COMMENTS;  

Start 3/17/20 at 17:00;  Stop 3/19/20 at 16:59;  Status DC


Meropenem 1 gm/ Sodium Chloride 100 ml @  200 mls/hr Q8HRS IV  Last administered

on 3/18/20at 05:45;  Start 3/17/20 at 20:00;  Stop 3/18/20 at 08:48;  Status DC


Furosemide (Lasix) 40 mg 1X  ONCE IVP  Last administered on 3/17/20at 22:12;  

Start 3/17/20 at 22:30;  Stop 3/17/20 at 22:31;  Status DC


Calcium Chloride 1000 mg/Sodium Chloride 110 ml @  220 mls/hr 1X  ONCE IV  Last 

administered on 3/17/20at 22:11;  Start 3/17/20 at 22:30;  Stop 3/17/20 at 

22:59;  Status DC


Albuterol Sulfate (Ventolin Neb Soln) 2.5 mg 1X  ONCE NEB  Last administered on 

3/18/20at 00:56;  Start 3/17/20 at 22:30;  Stop 3/17/20 at 22:31;  Status DC


Insulin Human Regular (HumuLIN R VIAL) 5 unit 1X  ONCE IV  Last administered on 

3/17/20at 22:14;  Start 3/17/20 at 22:30;  Stop 3/17/20 at 22:31;  Status DC


Magnesium Sulfate 50 ml @ 25 mls/hr 1X  ONCE IV  Last administered on 3/18/20at 

02:57;  Start 3/18/20 at 03:00;  Stop 3/18/20 at 04:59;  Status DC


Calcium Gluconate 1000 mg/Sodium Chloride 110 ml @  220 mls/hr 1X  ONCE IV  Last

administered on 3/18/20at 02:46;  Start 3/18/20 at 03:00;  Stop 3/18/20 at 

03:29;  Status DC


Sodium Chloride 1,000 ml @  200 mls/hr Q5H IV  Last administered on 3/18/20at 

02:46;  Start 3/18/20 at 03:00;  Stop 3/18/20 at 10:21;  Status DC


Calcium Gluconate 1000 mg/Sodium Chloride 110 ml @  220 mls/hr 1X  ONCE IV  Last

administered on 3/18/20at 03:21;  Start 3/18/20 at 03:30;  Stop 3/18/20 at 

03:59;  Status DC


Sodium Bicarbonate 50 meq/Sodium Chloride 1,050 ml @  75 mls/hr Q14H IV  Last 

administered on 3/22/20at 21:10;  Start 3/18/20 at 07:30;  Stop 3/23/20 at 

10:28;  Status DC


Calcium Gluconate 2000 mg/Sodium Chloride 120 ml @  220 mls/hr 1X  ONCE IV  Last

administered on 3/18/20at 09:05;  Start 3/18/20 at 07:30;  Stop 3/18/20 at 

08:02;  Status DC


Lidocaine HCl (Xylocaine-Mpf 1% 2ml Vial) 2 ml STK-MED ONCE .ROUTE ;  Start 

3/18/20 at 08:47;  Stop 3/18/20 at 08:47;  Status DC


Meropenem 500 mg/ Sodium Chloride 50 ml @  100 mls/hr Q12HR IV  Last admini

stered on 3/23/20at 21:01;  Start 3/18/20 at 18:00;  Stop 3/24/20 at 07:58;  

Status DC


Lidocaine HCl (Buffered Lidocaine 1%) 3 ml STK-MED ONCE .ROUTE ;  Start 3/18/20 

at 09:46;  Stop 3/18/20 at 09:46;  Status DC


Lidocaine HCl (Buffered Lidocaine 1%) 6 ml 1X  ONCE INJ  Last administered on 

3/18/20at 10:26;  Start 3/18/20 at 10:15;  Stop 3/18/20 at 10:16;  Status DC


Info (Tpn Per Pharmacy) 1 each PRN DAILY  PRN MC SEE COMMENTS Last administered 

on 4/28/20at 12:51;  Start 3/18/20 at 12:00


Sodium Chloride 1,000 ml @  1,000 mls/hr Q1H PRN IV hypotension;  Start 3/18/20 

at 12:07;  Stop 3/18/20 at 18:06;  Status DC


Diphenhydramine HCl (Benadryl) 25 mg 1X PRN  PRN IV ITCHING;  Start 3/18/20 at 

12:15;  Stop 3/19/20 at 12:14;  Status DC


Diphenhydramine HCl (Benadryl) 25 mg 1X PRN  PRN IV ITCHING;  Start 3/18/20 at 

12:15;  Stop 3/19/20 at 12:14;  Status DC


Sodium Chloride 1,000 ml @  400 mls/hr Q2H30M PRN IV PATENCY;  Start 3/18/20 at 

12:07;  Stop 3/19/20 at 00:06;  Status DC


Info (PHARMACY MONITORING -- do not chart) 1 each PRN DAILY  PRN MC SEE 

COMMENTS;  Start 3/18/20 at 12:15;  Stop 3/20/20 at 08:13;  Status DC


Sodium Chloride 90 meq/Calcium Gluconate 10 meq/ Multivitamins 10 ml/Chromium/ 

Copper/Manganese/ Seleni/Zn 1 ml/ Total Parenteral Nutrition/Amino Acids/Dextro

se/ Fat Emulsion Intravenous 55.005 ml  @ 2.292 mls/hr TPN  CONT IV ;  Start 

3/18/20 at 22:00;  Stop 3/18/20 at 12:33;  Status DC


Info (Tpn Per Pharmacy) 1 each PRN DAILY  PRN MC SEE COMMENTS;  Start 3/18/20 at

12:30;  Status UNV


Sodium Chloride 90 meq/Calcium Gluconate 10 meq/ Multivitamins 10 ml/Chromium/ 

Copper/Manganese/ Seleni/Zn 0.5 ml/ Total Parenteral Nutrition/Amino Acids/

Dextrose/ Fat Emulsion Intravenous 1,512 ml @  63 mls/hr TPN  CONT IV  Last 

administered on 3/18/20at 22:06;  Start 3/18/20 at 22:00;  Stop 3/19/20 at 

21:59;  Status DC


Calcium Carbonate/ Glycine (Tums) 500 mg PRN AFTMEALHC  PRN PO INDIGESTION;  

Start 3/18/20 at 17:45


Calcium Gluconate (Calcium Gluconate) 2,000 mg 1X  ONCE IVP  Last administered 

on 3/19/20at 02:19;  Start 3/19/20 at 02:15;  Stop 3/19/20 at 02:16;  Status DC


Calcium Chloride 3000 mg/Sodium Chloride 1,030 ml @  50 mls/hr [id number] IV  Last 

administered on 3/21/20at 02:17;  Start 3/19/20 at 08:00;  Stop 3/21/20 at 

15:23;  Status DC


Lorazepam (Ativan Inj) 1 mg PRN Q4HRS  PRN IVP ANXIETY / AGITATION, 2nd choic 

Last administered on 4/17/20at 03:51;  Start 3/19/20 at 09:00;  Stop 4/17/20 at 

09:19;  Status DC


Sodium Chloride 1,000 ml @  1,000 mls/hr Q1H PRN IV hypotension;  Start 3/19/20 

at 08:56;  Stop 3/19/20 at 14:55;  Status DC


Albumin Human 200 ml @  200 mls/hr 1X PRN  PRN IV Hypotension;  Start 3/19/20 at

09:00;  Stop 3/19/20 at 14:59;  Status DC


Diphenhydramine HCl (Benadryl) 25 mg 1X PRN  PRN IV ITCHING;  Start 3/19/20 at 

09:00;  Stop 3/20/20 at 08:59;  Status DC


Diphenhydramine HCl (Benadryl) 25 mg 1X PRN  PRN IV ITCHING;  Start 3/19/20 at 

09:00;  Stop 3/20/20 at 08:59;  Status DC


Sodium Chloride 1,000 ml @  400 mls/hr Q2H30M PRN IV PATENCY;  Start 3/19/20 at 

08:56;  Stop 3/19/20 at 20:55;  Status DC


Info (PHARMACY MONITORING -- do not chart) 1 each PRN DAILY  PRN MC SEE 

COMMENTS;  Start 3/19/20 at 09:00;  Status UNV


Info (PHARMACY MONITORING -- do not chart) 1 each PRN DAILY  PRN MC SEE 

COMMENTS;  Start 3/19/20 at 09:00;  Stop 3/20/20 at 08:13;  Status DC


Digoxin (Lanoxin) 500 mcg 1X  ONCE IV  Last administered on 3/19/20at 10:04;  

Start 3/19/20 at 10:00;  Stop 3/19/20 at 10:01;  Status DC


Digoxin (Lanoxin) 125 mcg 1X  ONCE IV  Last administered on 3/19/20at 17:10;  

Start 3/19/20 at 18:00;  Stop 3/19/20 at 18:01;  Status DC


Magnesium Sulfate 100 ml @  25 mls/hr 1X  ONCE IV  Last administered on 

3/19/20at 12:48;  Start 3/19/20 at 13:00;  Stop 3/19/20 at 16:59;  Status DC


Sodium Chloride 90 meq/Magnesium Sulfate 10 meq/ Calcium Gluconate 20 meq/ 

Multivitamins 10 ml/Chromium/ Copper/Manganese/ Seleni/Zn 0.5 ml/ Total 

Parenteral Nutrition/Amino Acids/Dextrose/ Fat Emulsion Intravenous 1,512 ml @  

63 mls/hr TPN  CONT IV  Last administered on 3/19/20at 22:25;  Start 3/19/20 at 

22:00;  Stop 3/20/20 at 21:59;  Status DC


Sodium Chloride 1,000 ml @  1,000 mls/hr Q1H PRN IV hypotension;  Start 3/20/20 

at 08:05;  Stop 3/20/20 at 14:04;  Status DC


Albumin Human 200 ml @  200 mls/hr 1X  ONCE IV  Last administered on 3/20/20at 

08:57;  Start 3/20/20 at 08:15;  Stop 3/20/20 at 09:14;  Status DC


Diphenhydramine HCl (Benadryl) 25 mg 1X PRN  PRN IV ITCHING;  Start 3/20/20 at 

08:15;  Stop 3/21/20 at 08:14;  Status DC


Diphenhydramine HCl (Benadryl) 25 mg 1X PRN  PRN IV ITCHING;  Start 3/20/20 at 

08:15;  Stop 3/21/20 at 08:14;  Status DC


Sodium Chloride 1,000 ml @  400 mls/hr Q2H30M PRN IV PATENCY;  Start 3/20/20 at 

08:05;  Stop 3/20/20 at 20:04;  Status DC


Info (PHARMACY MONITORING -- do not chart) 1 each PRN DAILY  PRN MC SEE 

COMMENTS;  Start 3/20/20 at 08:15;  Stop 3/24/20 at 07:57;  Status DC


Sodium Chloride 90 meq/Potassium Chloride 15 meq/ Potassium Phosphate 10 mmol/ 

Magnesium Sulfate 10 meq/Calcium Gluconate 20 meq/ Multivitamins 10 ml/Chromium/

Copper/Manganese/ Seleni/Zn 0.5 ml/ Total Parenteral Nutrition/Amino 

Acids/Dextrose/ Fat Emulsion Intravenous 1,512 ml @  63 mls/hr TPN  CONT IV  

Last administered on 3/20/20at 21:01;  Start 3/20/20 at 22:00;  Stop 3/21/20 at 

21:59;  Status DC


Potassium Chloride/Water 100 ml @  100 mls/hr 1X  ONCE IV  Last administered on 

3/20/20at 14:09;  Start 3/20/20 at 14:00;  Stop 3/20/20 at 14:59;  Status DC


Benzocaine (Hurricaine One) 1 spray 1X  ONCE MM  Last administered on 3/20/20at 

16:38;  Start 3/20/20 at 14:30;  Stop 3/20/20 at 14:31;  Status DC


Lidocaine HCl (Glydo (Lidocaine) Jelly) 1 thomas 1X  ONCE MM  Last administered on 

3/20/20at 16:38;  Start 3/20/20 at 14:30;  Stop 3/20/20 at 14:31;  Status DC


Linezolid/Dextrose 300 ml @  300 mls/hr Q12HR IV  Last administered on 3/26/20at

21:04;  Start 3/20/20 at 20:00;  Stop 3/27/20 at 07:50;  Status DC


Acetaminophen (Tylenol) 650 mg PRN Q6HRS  PRN PO MILD PAIN / TEMP;  Start 

3/21/20 at 03:30;  Stop 3/21/20 at 03:36;  Status DC


Acetaminophen (Tylenol) 650 mg PRN Q6HRS  PRN PEG MILD PAIN / TEMP Last 

administered on 4/16/20at 19:56;  Start 3/21/20 at 03:36


Sodium Chloride 1,000 ml @  1,000 mls/hr Q1H PRN IV hypotension;  Start 3/21/20 

at 07:50;  Stop 3/21/20 at 13:49;  Status DC


Albumin Human 200 ml @  200 mls/hr 1X PRN  PRN IV Hypotension;  Start 3/21/20 at

08:00;  Stop 3/21/20 at 13:59;  Status DC


Sodium Chloride (Normal Saline Flush) 10 ml 1X PRN  PRN IV AP catheter pack;  

Start 3/21/20 at 08:00;  Stop 3/22/20 at 07:59;  Status DC


Sodium Chloride (Normal Saline Flush) 10 ml 1X PRN  PRN IV  catheter pack;  

Start 3/21/20 at 08:00;  Stop 3/22/20 at 07:59;  Status DC


Sodium Chloride 1,000 ml @  400 mls/hr Q2H30M PRN IV PATENCY;  Start 3/21/20 at 

07:50;  Stop 3/21/20 at 19:49;  Status DC


Info (PHARMACY MONITORING -- do not chart) 1 each PRN DAILY  PRN MC SEE 

COMMENTS;  Start 3/21/20 at 08:00;  Status UNV


Info (PHARMACY MONITORING -- do not chart) 1 each PRN DAILY  PRN MC SEE 

COMMENTS;  Start 3/21/20 at 08:00;  Stop 3/23/20 at 08:25;  Status DC


Sodium Chloride 90 meq/Potassium Chloride 15 meq/ Potassium Phosphate 10 mmol/ 

Magnesium Sulfate 10 meq/Calcium Gluconate 20 meq/ Multivitamins 10 ml/Chromium/

Copper/Manganese/ Seleni/Zn 0.5 ml/ Total Parenteral Nutrition/Amino 

Acids/Dextrose/ Fat Emulsion Intravenous 1,512 ml @  63 mls/hr TPN  CONT IV  

Last administered on 3/21/20at 20:57;  Start 3/21/20 at 22:00;  Stop 3/22/20 at 

21:59;  Status DC


Sodium Chloride 90 meq/Potassium Chloride 15 meq/ Potassium Phosphate 15 mmol/ 

Magnesium Sulfate 10 meq/Calcium Gluconate 20 meq/ Multivitamins 10 ml/Chromium/

Copper/Manganese/ Seleni/Zn 0.5 ml/ Total Parenteral Nutrition/Amino Acids/Dext

verenice/ Fat Emulsion Intravenous 1,512 ml @  63 mls/hr TPN  CONT IV ;  Start 

3/22/20 at 22:00;  Stop 3/22/20 at 14:16;  Status DC


Sodium Chloride 90 meq/Potassium Chloride 15 meq/ Potassium Phosphate 15 mmol/ 

Magnesium Sulfate 10 meq/Calcium Gluconate 20 meq/ Multivitamins 10 ml/Chromium/

Copper/Manganese/ Seleni/Zn 0.5 ml/ Total Parenteral Nutrition/Amino 

Acids/Dextrose/ Fat Emulsion Intravenous 1,200 ml @  50 mls/hr TPN  CONT IV ;  

Start 3/22/20 at 22:00;  Stop 3/22/20 at 14:17;  Status DC


Sodium Chloride 90 meq/Potassium Chloride 15 meq/ Potassium Phosphate 10 mmol/ 

Magnesium Sulfate 10 meq/Calcium Gluconate 20 meq/ Multivitamins 10 ml/Chromium/

Copper/Manganese/ Seleni/Zn 0.5 ml/ Total Parenteral Nutrition/Amino 

Acids/Dextrose/ Fat Emulsion Intravenous 1,200 ml @  50 mls/hr TPN  CONT IV  

Last administered on 3/22/20at 23:29;  Start 3/22/20 at 22:00;  Stop 3/23/20 at 

21:59;  Status DC


Sodium Chloride 1,000 ml @  1,000 mls/hr Q1H PRN IV hypotension;  Start 3/23/20 

at 07:28;  Stop 3/23/20 at 13:27;  Status DC


Albumin Human 200 ml @  200 mls/hr 1X  ONCE IV  Last administered on 3/23/20at 

08:51;  Start 3/23/20 at 07:30;  Stop 3/23/20 at 08:29;  Status DC


Diphenhydramine HCl (Benadryl) 25 mg 1X PRN  PRN IV ITCHING;  Start 3/23/20 at 

07:30;  Stop 3/24/20 at 07:29;  Status DC


Diphenhydramine HCl (Benadryl) 25 mg 1X PRN  PRN IV ITCHING;  Start 3/23/20 at 

07:30;  Stop 3/24/20 at 07:29;  Status DC


Sodium Chloride 1,000 ml @  400 mls/hr Q2H30M PRN IV PATENCY;  Start 3/23/20 at 

07:28;  Stop 3/23/20 at 19:27;  Status DC


Info (PHARMACY MONITORING -- do not chart) 1 each PRN DAILY  PRN MC SEE 

COMMENTS;  Start 3/23/20 at 07:30;  Stop 4/3/20 at 13:01;  Status DC


Metronidazole 100 ml @  100 mls/hr Q6HRS IV  Last administered on 4/8/20at 

06:26;  Start 3/23/20 at 08:30;  Stop 4/8/20 at 09:58;  Status DC


Micafungin Sodium 100 mg/Dextrose 100 ml @  100 mls/hr Q24H IV  Last 

administered on 4/29/20at 08:43;  Start 3/23/20 at 09:00


Propofol 0 ml @ As Directed STK-MED ONCE IV ;  Start 3/23/20 at 07:53;  Stop 

3/23/20 at 07:53;  Status DC


Etomidate (Amidate) 20 mg STK-MED ONCE IV ;  Start 3/23/20 at 07:53;  Stop 

3/23/20 at 07:54;  Status DC


Midazolam HCl (Versed) 5 mg STK-MED ONCE .ROUTE ;  Start 3/23/20 at 07:57;  Stop

3/23/20 at 07:57;  Status DC


Fentanyl Citrate 30 ml @ 0 mls/hr CONT  PRN IV SEE PROTOCOL Last administered on

4/17/20at 06:12;  Start 3/23/20 at 08:15;  Stop 4/17/20 at 09:19;  Status DC


Artificial Tears (Artificial Tears) 1 drop PRN Q1HR  PRN OU DRY EYE, 1st choice;

 Start 3/23/20 at 08:15;  Stop 4/29/20 at 05:31;  Status DC


Midazolam HCl 50 mg/Sodium Chloride 50 ml @ 0 mls/hr CONT  PRN IV SEE PROTOCOL 

Last administered on 3/26/20at 22:39;  Start 3/23/20 at 08:15;  Stop 3/28/20 at 

15:59;  Status DC


Etomidate (Amidate) 8 mg 1X  ONCE IV  Last administered on 3/23/20at 08:33;  S

tart 3/23/20 at 08:30;  Stop 3/23/20 at 08:31;  Status DC


Succinylcholine Chloride (Anectine) 120 mg 1X  ONCE IV  Last administered on 

3/23/20at 08:34;  Start 3/23/20 at 08:30;  Stop 3/23/20 at 08:31;  Status DC


Midazolam HCl (Versed) 5 mg 1X  ONCE IV ;  Start 3/23/20 at 08:30;  Stop 3/23/20

at 08:31;  Status DC


Potassium Chloride 15 meq/ Bicarbonate Dialysis Soln w/ out KCl 5,007.5 ml  @ 

1,000 mls/ hr Q5H1M IV  Last administered on 3/24/20at 11:11;  Start 3/23/20 at 

12:00;  Stop 3/24/20 at 11:15;  Status DC


Potassium Chloride 15 meq/ Bicarbonate Dialysis Soln w/ out KCl 5,007.5 ml  @ 

1,000 mls/ hr Q5H1M IV  Last administered on 3/24/20at 11:12;  Start 3/23/20 at 

12:00;  Stop 3/24/20 at 11:17;  Status DC


Potassium Chloride 15 meq/ Bicarbonate Dialysis Soln w/ out KCl 5,007.5 ml  @ 

1,000 mls/ hr Q5H1M IV  Last administered on 3/24/20at 11:11;  Start 3/23/20 at 

12:00;  Stop 3/24/20 at 11:19;  Status DC


Sodium Chloride 90 meq/Potassium Chloride 15 meq/ Potassium Phosphate 10 mmol/ 

Magnesium Sulfate 10 meq/Calcium Gluconate 20 meq/ Multivitamins 10 ml/Chromium/

Copper/Manganese/ Seleni/Zn 0.5 ml/ Total Parenteral Nutrition/Amino Acids/Dex

trose/ Fat Emulsion Intravenous 1,400 ml @  58.333 mls/ hr TPN  CONT IV  Last 

administered on 3/23/20at 21:42;  Start 3/23/20 at 22:00;  Stop 3/24/20 at 

21:59;  Status DC


Heparin Sodium (Porcine) (Heparin Sodium) 5,000 unit Q8HRS SQ  Last administered

on 3/28/20at 05:55;  Start 3/23/20 at 15:00;  Stop 3/28/20 at 13:28;  Status DC


Meropenem 500 mg/ Sodium Chloride 50 ml @  100 mls/hr Q6HRS IV  Last 

administered on 3/25/20at 06:00;  Start 3/24/20 at 09:00;  Stop 3/25/20 at 

07:29;  Status DC


Potassium Phosphate 20 mmol/ Sodium Chloride 106.6667 ml @  51.667 m... 1X  ONCE

IV  Last administered on 3/24/20at 11:22;  Start 3/24/20 at 10:15;  Stop 3/24/20

at 12:18;  Status DC


Acetaminophen (Tylenol Supp) 650 mg PRN Q6HRS  PRN LA MILD PAIN / TEMP > 100.3'F

Last administered on 4/27/20at 00:32;  Start 3/24/20 at 10:30


Potassium Chloride/Water 100 ml @  100 mls/hr Q1H IV  Last administered on 3

/24/20at 12:12;  Start 3/24/20 at 11:00;  Stop 3/24/20 at 12:59;  Status DC


Potassium Chloride 20 meq/ Bicarbonate Dialysis Soln w/ out KCl 5,010 ml @  

1,000 mls/hr Q5H1M IV  Last administered on 3/25/20at 08:48;  Start 3/24/20 at 

12:00;  Stop 3/25/20 at 13:03;  Status DC


Potassium Chloride 20 meq/ Bicarbonate Dialysis Soln w/ out KCl 5,010 ml @  

1,000 mls/hr Q5H1M IV  Last administered on 3/29/20at 14:52;  Start 3/24/20 at 

11:30;  Stop 3/29/20 at 19:59;  Status DC


Potassium Chloride 20 meq/ Bicarbonate Dialysis Soln w/ out KCl 5,010 ml @  

1,000 mls/hr Q5H1M IV  Last administered on 3/29/20at 14:53;  Start 3/24/20 at 

11:30;  Stop 3/29/20 at 19:59;  Status DC


Sodium Chloride 90 meq/Potassium Chloride 15 meq/ Potassium Phosphate 15 mmol/ 

Magnesium Sulfate 10 meq/Calcium Gluconate 15 meq/ Multivitamins 10 ml/Chromium/

Copper/Manganese/ Seleni/Zn 0.5 ml/ Total Parenteral Nutrition/Amino 

Acids/Dextrose/ Fat Emulsion Intravenous 1,400 ml @  58.333 mls/ hr TPN  CONT IV

 Last administered on 3/24/20at 22:17;  Start 3/24/20 at 22:00;  Stop 3/25/20 at

21:59;  Status DC


Cefepime HCl (Maxipime) 2 gm Q12HR IVP  Last administered on 4/7/20at 20:56;  

Start 3/25/20 at 09:00;  Stop 4/8/20 at 09:58;  Status DC


Daptomycin 500 mg/ Sodium Chloride 50 ml @  100 mls/hr Q48H IV  Last 

administered on 4/10/20at 09:57;  Start 3/25/20 at 08:30;  Stop 4/10/20 at 

10:07;  Status DC


Lidocaine HCl (Buffered Lidocaine 1%) 3 ml 1X  ONCE INJ  Last administered on 

3/25/20at 10:27;  Start 3/25/20 at 10:30;  Stop 3/25/20 at 10:31;  Status DC


Potassium Phosphate 20 mmol/ Sodium Chloride 106.6667 ml @  51.667 m... 1X  ONCE

IV  Last administered on 3/25/20at 12:51;  Start 3/25/20 at 13:00;  Stop 3/25/20

at 15:03;  Status DC


Sodium Chloride 90 meq/Potassium Chloride 15 meq/ Potassium Phosphate 18 mmol/ M

agnesium Sulfate 8 meq/Calcium Gluconate 15 meq/ Multivitamins 10 ml/Chromium/ 

Copper/Manganese/ Seleni/Zn 0.5 ml/ Total Parenteral Nutrition/Amino 

Acids/Dextrose/ Fat Emulsion Intravenous 1,400 ml @  58.333 mls/ hr TPN  CONT IV

 Last administered on 3/25/20at 22:16;  Start 3/25/20 at 22:00;  Stop 3/26/20 at

21:59;  Status DC


Potassium Chloride 20 meq/ Bicarbonate Dialysis Soln w/ out KCl 5,010 ml @  

1,000 mls/hr Q5H1M IV  Last administered on 3/29/20at 14:54;  Start 3/25/20 at 

16:00;  Stop 3/29/20 at 19:59;  Status DC


Multi-Ingred Cream/Lotion/Oil/ Oint (Artificial Tears Eye Ointment) 1 thomas PRN 

Q1HR  PRN OU DRY EYE, 2nd choice Last administered on 4/13/20at 08:19;  Start 

3/25/20 at 17:30


Sodium Chloride 90 meq/Potassium Chloride 15 meq/ Potassium Phosphate 18 mmol/ 

Magnesium Sulfate 8 meq/Calcium Gluconate 15 meq/ Multivitamins 10 ml/Chromium/ 

Copper/Manganese/ Seleni/Zn 0.5 ml/ Total Parenteral Nutrition/Amino 

Acids/Dextrose/ Fat Emulsion Intravenous 1,400 ml @  58.333 mls/ hr TPN  CONT IV

 Last administered on 3/26/20at 22:00;  Start 3/26/20 at 22:00;  Stop 3/27/20 at

21:59;  Status DC


Albumin Human 500 ml @  125 mls/hr 1X  ONCE IV ;  Start 3/26/20 at 14:15;  Stop 

3/26/20 at 18:14;  Status DC


Sodium Chloride 90 meq/Potassium Chloride 15 meq/ Potassium Phosphate 18 mmol/ 

Magnesium Sulfate 8 meq/Calcium Gluconate 15 meq/ Multivitamins 10 ml/Chromium/ 

Copper/Manganese/ Seleni/Zn 0.5 ml/ Insulin Human Regular 10 unit/ Total 

Parenteral Nutrition/Amino Acids/Dextrose/ Fat Emulsion Intravenous 1,400 ml @  

58.333 mls/ hr TPN  CONT IV  Last administered on 3/27/20at 21:43;  Start 

3/27/20 at 22:00;  Stop 3/28/20 at 21:59;  Status DC


Lidocaine HCl (Buffered Lidocaine 1%) 3 ml STK-MED ONCE .ROUTE ;  Start 3/25/20 

at 10:00;  Stop 3/27/20 at 13:57;  Status DC


Midazolam HCl 100 mg/Sodium Chloride 100 ml @ 7 mls/hr CONT  PRN IV SEE PROTOCOL

Last administered on 4/8/20at 15:35;  Start 3/28/20 at 16:00


Sodium Chloride 90 meq/Potassium Chloride 15 meq/ Potassium Phosphate 18 mmol/ 

Magnesium Sulfate 8 meq/Calcium Gluconate 15 meq/ Multivitamins 10 ml/Chromium/ 

Copper/Manganese/ Seleni/Zn 0.5 ml/ Insulin Human Regular 15 unit/ Total 

Parenteral Nutrition/Amino Acids/Dextrose/ Fat Emulsion Intravenous 1,400 ml @  

58.333 mls/ hr TPN  CONT IV  Last administered on 3/28/20at 20:34;  Start 

3/28/20 at 22:00;  Stop 3/29/20 at 21:59;  Status DC


Info ([facility] Electrolyte Protocol) 1 ea CONT PRN  PRN MC PER PROTOCOL;  Start 

3/29/20 at 13:15


Sodium Chloride 90 meq/Potassium Chloride 15 meq/ Potassium Phosphate 18 mmol/ 

Magnesium Sulfate 8 meq/Calcium Gluconate 15 meq/ Multivitamins 10 ml/Chromium/ 

Copper/Manganese/ Seleni/Zn 0.5 ml/ Insulin Human Regular 15 unit/ Total Pare

nteral Nutrition/Amino Acids/Dextrose/ Fat Emulsion Intravenous 1,400 ml @  

58.333 mls/ hr TPN  CONT IV  Last administered on 3/29/20at 22:05;  Start 

3/29/20 at 22:00;  Stop 3/30/20 at 21:59;  Status DC


Potassium Chloride 15 meq/ Bicarbonate Dialysis Soln w/ out KCl 5,007.5 ml  @ 

1,000 mls/ hr Q5H1M IV  Last administered on 4/1/20at 18:14;  Start 3/29/20 at 

20:00;  Stop 4/2/20 at 13:08;  Status DC


Potassium Chloride 15 meq/ Bicarbonate Dialysis Soln w/ out KCl 5,007.5 ml  @ 

1,000 mls/ hr Q5H1M IV  Last administered on 4/1/20at 18:14;  Start 3/29/20 at 

20:00;  Stop 4/2/20 at 13:08;  Status DC


Potassium Chloride 15 meq/ Bicarbonate Dialysis Soln w/ out KCl 5,007.5 ml  @ 

1,000 mls/ hr Q5H1M IV  Last administered on 4/1/20at 18:14;  Start 3/29/20 at 

20:00;  Stop 4/2/20 at 13:08;  Status DC


Iohexol (Omnipaque 240 Mg/ml) 30 ml 1X  ONCE PO  Last administered on 3/30/20at 

11:30;  Start 3/30/20 at 11:30;  Stop 3/30/20 at 11:33;  Status DC


Info (CONTRAST GIVEN -- Rx MONITORING) 1 each PRN DAILY  PRN MC SEE COMMENTS;  S

tart 3/30/20 at 11:45;  Stop 4/1/20 at 11:44;  Status DC


Sodium Chloride 90 meq/Potassium Chloride 15 meq/ Potassium Phosphate 18 mmol/ 

Magnesium Sulfate 8 meq/Calcium Gluconate 15 meq/ Multivitamins 10 ml/Chromium/ 

Copper/Manganese/ Seleni/Zn 0.5 ml/ Insulin Human Regular 15 unit/ Total 

Parenteral Nutrition/Amino Acids/Dextrose/ Fat Emulsion Intravenous 1,400 ml @  

58.333 mls/ hr TPN  CONT IV  Last administered on 3/30/20at 21:47;  Start 

3/30/20 at 22:00;  Stop 3/31/20 at 21:59;  Status DC


Sodium Chloride 90 meq/Potassium Chloride 15 meq/ Potassium Phosphate 18 mmol/ 

Magnesium Sulfate 8 meq/Calcium Gluconate 15 meq/ Multivitamins 10 ml/Chromium/ 

Copper/Manganese/ Seleni/Zn 0.5 ml/ Insulin Human Regular 20 unit/ Total 

Parenteral Nutrition/Amino Acids/Dextrose/ Fat Emulsion Intravenous 1,400 ml @  

58.333 mls/ hr TPN  CONT IV  Last administered on 3/31/20at 21:36;  Start 

3/31/20 at 22:00;  Stop 4/1/20 at 21:59;  Status DC


Alteplase, Recombinant (Cathflo For Central Catheter Clearance) 1 mg 1X  ONCE 

INT CAT  Last administered on 3/31/20at 20:03;  Start 3/31/20 at 19:30;  Stop 

3/31/20 at 19:46;  Status DC


Alteplase, Recombinant (Cathflo For Central Catheter Clearance) 1 mg 1X  ONCE 

INT CAT  Last administered on 3/31/20at 22:05;  Start 3/31/20 at 22:00;  Stop 

3/31/20 at 22:01;  Status DC


Sodium Chloride 90 meq/Potassium Chloride 15 meq/ Potassium Phosphate 18 mmol/ 

Magnesium Sulfate 8 meq/Calcium Gluconate 15 meq/ Multivitamins 10 ml/Chromium/ 

Copper/Manganese/ Seleni/Zn 0.5 ml/ Insulin Human Regular 20 unit/ Total 

Parenteral Nutrition/Amino Acids/Dextrose/ Fat Emulsion Intravenous 1,400 ml @  

58.333 mls/ hr TPN  CONT IV  Last administered on 4/1/20at 21:30;  Start 4/1/20 

at 22:00;  Stop 4/2/20 at 21:59;  Status DC


Dexmedetomidine HCl 400 mcg/ Sodium Chloride 100 ml @ 0 mls/hr CONT  PRN IV 

ANXIETY / AGITATION Last administered on 4/29/20at 05:56;  Start 4/2/20 at 08:15


Sodium Chloride 500 ml @  500 mls/hr 1X PRN  PRN IV ELEVATED BP, SEE COMMENTS;  

Start 4/2/20 at 08:15


Atropine Sulfate (ATROPINE 0.5mg SYRINGE) 0.5 mg PRN Q5MIN  PRN IV SEE COMMENTS;

 Start 4/2/20 at 08:15


Furosemide (Lasix) 20 mg 1X  ONCE IVP  Last administered on 4/2/20at 08:19;  

Start 4/2/20 at 08:15;  Stop 4/2/20 at 08:16;  Status DC


Lidocaine HCl (Buffered Lidocaine 1%) 3 ml STK-MED ONCE .ROUTE ;  Start 4/2/20 

at 08:39;  Stop 4/2/20 at 08:39;  Status DC


Lidocaine HCl (Buffered Lidocaine 1%) 6 ml 1X  ONCE INJ  Last administered on 

4/2/20at 09:05;  Start 4/2/20 at 09:00;  Stop 4/2/20 at 09:06;  Status DC


Sodium Chloride 90 meq/Potassium Chloride 15 meq/ Potassium Phosphate 18 mmol/ 

Magnesium Sulfate 8 meq/Calcium Gluconate 15 meq/ Multivitamins 10 ml/Chromium/ 

Copper/Manganese/ Seleni/Zn 0.5 ml/ Insulin Human Regular 20 unit/ Total 

Parenteral Nutrition/Amino Acids/Dextrose/ Fat Emulsion Intravenous 1,400 ml @  

58.333 mls/ hr TPN  CONT IV  Last administered on 4/2/20at 22:45;  Start 4/2/20 

at 22:00;  Stop 4/3/20 at 21:59;  Status DC


Sodium Chloride 1,000 ml @  1,000 mls/hr Q1H PRN IV hypotension;  Start 4/3/20 

at 07:30;  Stop 4/3/20 at 13:29;  Status DC


Albumin Human 200 ml @  200 mls/hr 1X PRN  PRN IV Hypotension Last administered 

on 4/3/20at 09:36;  Start 4/3/20 at 07:30;  Stop 4/3/20 at 13:29;  Status DC


Sodium Chloride (Normal Saline Flush) 10 ml 1X PRN  PRN IV AP catheter pack;  

Start 4/3/20 at 07:30;  Stop 4/3/20 at 21:29;  Status DC


Sodium Chloride (Normal Saline Flush) 10 ml 1X PRN  PRN IV  catheter pack;  

Start 4/3/20 at 07:30;  Stop 4/4/20 at 07:29;  Status DC


Sodium Chloride 1,000 ml @  400 mls/hr Q2H30M PRN IV PATENCY;  Start 4/3/20 at 

07:30;  Stop 4/3/20 at 19:29;  Status DC


Info (PHARMACY MONITORING -- do not chart) 1 each PRN DAILY  PRN MC SEE 

COMMENTS;  Start 4/3/20 at 07:30;  Stop 4/3/20 at 13:02;  Status DC


Info (PHARMACY MONITORING -- do not chart) 1 each PRN DAILY  PRN MC SEE 

COMMENTS;  Start 4/3/20 at 07:30;  Stop 4/5/20 at 12:45;  Status DC


Sodium Chloride 90 meq/Potassium Chloride 15 meq/ Potassium Phosphate 10 mmol/ 

Magnesium Sulfate 8 meq/Calcium Gluconate 15 meq/ Multivitamins 10 ml/Chromium/ 

Copper/Manganese/ Seleni/Zn 0.5 ml/ Insulin Human Regular 25 unit/ Total 

Parenteral Nutrition/Amino Acids/Dextrose/ Fat Emulsion Intravenous 1,400 ml @  

58.333 mls/ hr TPN  CONT IV  Last administered on 4/3/20at 22:19;  Start 4/3/20 

at 22:00;  Stop 4/4/20 at 21:59;  Status DC


Heparin Sodium (Porcine) (Heparin Sodium) 5,000 unit Q12HR SQ  Last administered

on 4/26/20at 08:59;  Start 4/3/20 at 21:00;  Stop 4/26/20 at 10:05;  Status DC


Ondansetron HCl (Zofran) 4 mg PRN Q6HRS  PRN IV NAUSEA/VOMITING;  Start 4/6/20 

at 07:00;  Stop 4/7/20 at 06:59;  Status DC


Fentanyl Citrate (Fentanyl 2ml Vial) 25 mcg PRN Q5MIN  PRN IV MILD PAIN 1-3;  

Start 4/6/20 at 07:00;  Stop 4/7/20 at 06:59;  Status DC


Fentanyl Citrate (Fentanyl 2ml Vial) 50 mcg PRN Q5MIN  PRN IV MODERATE TO SEVERE

PAIN;  Start 4/6/20 at 07:00;  Stop 4/7/20 at 06:59;  Status DC


Ringer's Solution 1,000 ml @  30 mls/hr Q24H IV ;  Start 4/6/20 at 07:00;  Stop 

4/6/20 at 18:59;  Status DC


Lidocaine HCl (Xylocaine-Mpf 1% 2ml Vial) 2 ml PRN 1X  PRN ID PRIOR TO IV START;

 Start 4/6/20 at 07:00;  Stop 4/7/20 at 06:59;  Status DC


Prochlorperazine Edisylate (Compazine) 5 mg PACU PRN  PRN IV NAUSEA, MRX1;  

Start 4/6/20 at 07:00;  Stop 4/7/20 at 06:59;  Status DC


Sodium Chloride 1,000 ml @  1,000 mls/hr Q1H PRN IV hypotension;  Start 4/4/20 

at 09:10;  Stop 4/4/20 at 15:09;  Status DC


Albumin Human 200 ml @  200 mls/hr 1X PRN  PRN IV Hypotension Last administered 

on 4/4/20at 10:10;  Start 4/4/20 at 09:15;  Stop 4/4/20 at 15:14;  Status DC


Sodium Chloride 1,000 ml @  400 mls/hr Q2H30M PRN IV PATENCY;  Start 4/4/20 at 

09:10;  Stop 4/4/20 at 21:09;  Status DC


Info (PHARMACY MONITORING -- do not chart) 1 each PRN DAILY  PRN MC SEE 

COMMENTS;  Start 4/4/20 at 09:15;  Stop 4/5/20 at 12:45;  Status DC


Info (PHARMACY MONITORING -- do not chart) 1 each PRN DAILY  PRN MC SEE 

COMMENTS;  Start 4/4/20 at 09:15;  Stop 4/5/20 at 12:45;  Status DC


Sodium Chloride 90 meq/Potassium Chloride 15 meq/ Potassium Phosphate 10 mmol/ 

Magnesium Sulfate 8 meq/Calcium Gluconate 15 meq/ Multivitamins 10 ml/Chromium/ 

Copper/Manganese/ Seleni/Zn 0.5 ml/ Insulin Human Regular 25 unit/ Total 

Parenteral Nutrition/Amino Acids/Dextrose/ Fat Emulsion Intravenous 1,400 ml @  

58.333 mls/ hr TPN  CONT IV  Last administered on 4/4/20at 22:10;  Start 4/4/20 

at 22:00;  Stop 4/5/20 at 21:59;  Status DC


Magnesium Sulfate 50 ml @ 25 mls/hr PRN DAILY  PRN IV for Mag < 1.7 on am labs 

Last administered on 4/20/20at 17:27;  Start 4/5/20 at 09:15


Sodium Chloride 90 meq/Potassium Chloride 15 meq/ Potassium Phosphate 10 mmol/ 

Magnesium Sulfate 8 meq/Calcium Gluconate 15 meq/ Multivitamins 10 ml/Chromium/ 

Copper/Manganese/ Seleni/Zn 0.5 ml/ Insulin Human Regular 25 unit/ Total 

Parenteral Nutrition/Amino Acids/Dextrose/ Fat Emulsion Intravenous 1,400 ml @  

58.333 mls/ hr TPN  CONT IV  Last administered on 4/5/20at 21:20;  Start 4/5/20 

at 22:00;  Stop 4/6/20 at 21:59;  Status DC


Sodium Chloride 1,000 ml @  1,000 mls/hr Q1H PRN IV hypotension;  Start 4/5/20 

at 12:23;  Stop 4/5/20 at 18:22;  Status DC


Albumin Human 200 ml @  200 mls/hr 1X  ONCE IV  Last administered on 4/5/20at 

13:34;  Start 4/5/20 at 12:30;  Stop 4/5/20 at 13:29;  Status DC


Diphenhydramine HCl (Benadryl) 25 mg 1X PRN  PRN IV ITCHING;  Start 4/5/20 at 

12:30;  Stop 4/6/20 at 12:29;  Status DC


Diphenhydramine HCl (Benadryl) 25 mg 1X PRN  PRN IV ITCHING;  Start 4/5/20 at 

12:30;  Stop 4/6/20 at 12:29;  Status DC


Info (PHARMACY MONITORING -- do not chart) 1 each PRN DAILY  PRN MC SEE COMMENT

S;  Start 4/5/20 at 12:30;  Status Cancel


Bupivacaine HCl/ Epinephrine Bitart (Sensorcain-Epi 0.5%-1:639272 Mpf) 30 ml 

STK-MED ONCE .ROUTE  Last administered on 4/6/20at 11:44;  Start 4/6/20 at 

11:00;  Stop 4/6/20 at 11:01;  Status DC


Cellulose (Surgicel Fibrillar 1x2) 1 each STK-MED ONCE .ROUTE ;  Start 4/6/20 at

11:00;  Stop 4/6/20 at 11:01;  Status DC


Sodium Chloride 90 meq/Potassium Chloride 15 meq/ Potassium Phosphate 10 mmol/ 

Magnesium Sulfate 12 meq/Calcium Gluconate 15 meq/ Multivitamins 10 ml/Chromium/

Copper/Manganese/ Seleni/Zn 0.5 ml/ Insulin Human Regular 25 unit/ Total 

Parenteral Nutrition/Amino Acids/Dextrose/ Fat Emulsion Intravenous 1,400 ml @  

58.333 mls/ hr TPN  CONT IV  Last administered on 4/6/20at 22:24;  Start 4/6/20 

at 22:00;  Stop 4/7/20 at 21:59;  Status DC


Propofol 20 ml @ As Directed STK-MED ONCE IV ;  Start 4/6/20 at 11:07;  Stop 

4/6/20 at 11:07;  Status DC


Cellulose (Surgicel Hemostat 4x8) 1 each STK-MED ONCE .ROUTE  Last administered 

on 4/6/20at 11:44;  Start 4/6/20 at 11:55;  Stop 4/6/20 at 11:56;  Status DC


Sevoflurane (Ultane) 60 ml STK-MED ONCE IH ;  Start 4/6/20 at 12:46;  Stop 

4/6/20 at 12:46;  Status DC


Sodium Chloride 1,000 ml @  1,000 mls/hr Q1H PRN IV hypotension;  Start 4/6/20 

at 13:51;  Stop 4/6/20 at 19:50;  Status DC


Albumin Human 200 ml @  200 mls/hr 1X PRN  PRN IV Hypotension Last administered 

on 4/6/20at 14:51;  Start 4/6/20 at 14:00;  Stop 4/6/20 at 19:59;  Status DC


Diphenhydramine HCl (Benadryl) 25 mg 1X PRN  PRN IV ITCHING;  Start 4/6/20 at 

14:00;  Stop 4/7/20 at 13:59;  Status DC


Diphenhydramine HCl (Benadryl) 25 mg 1X PRN  PRN IV ITCHING;  Start 4/6/20 at 

14:00;  Stop 4/7/20 at 13:59;  Status DC


Sodium Chloride 1,000 ml @  400 mls/hr Q2H30M PRN IV PATENCY;  Start 4/6/20 at 

13:51;  Stop 4/7/20 at 01:50;  Status DC


Info (PHARMACY MONITORING -- do not chart) 1 each PRN DAILY  PRN MC SEE 

COMMENTS;  Start 4/6/20 at 14:00;  Stop 4/9/20 at 08:16;  Status DC


Heparin Sodium (Porcine) (Hep Lock Adult) 500 unit STK-MED ONCE IVP ;  Start 

4/7/20 at 09:29;  Stop 4/7/20 at 09:30;  Status DC


Sodium Chloride 1,000 ml @  1,000 mls/hr Q1H PRN IV hypotension;  Start 4/7/20 

at 10:43;  Stop 4/7/20 at 16:42;  Status DC


Sodium Chloride 1,000 ml @  400 mls/hr Q2H30M PRN IV PATENCY;  Start 4/7/20 at 

10:43;  Stop 4/7/20 at 22:42;  Status DC


Info (PHARMACY MONITORING -- do not chart) 1 each PRN DAILY  PRN MC SEE 

COMMENTS;  Start 4/7/20 at 10:45;  Status UNV


Info (PHARMACY MONITORING -- do not chart) 1 each PRN DAILY  PRN MC SEE 

COMMENTS;  Start 4/7/20 at 10:45;  Status UNV


Sodium Chloride 90 meq/Potassium Chloride 15 meq/ Magnesium Sulfate 12 

meq/Calcium Gluconate 15 meq/ Multivitamins 10 ml/Chromium/ Copper/Manganese/ 

Seleni/Zn 0.5 ml/ Insulin Human Regular 25 unit/ Total Parenteral 

Nutrition/Amino Acids/Dextrose/ Fat Emulsion Intravenous 1,400 ml @  58.333 mls/

hr TPN  CONT IV  Last administered on 4/7/20at 22:13;  Start 4/7/20 at 22:00;  

Stop 4/8/20 at 21:59;  Status DC


Sodium Chloride 1,000 ml @  1,000 mls/hr Q1H PRN IV hypotension;  Start 4/8/20 

at 07:50;  Stop 4/8/20 at 13:49;  Status DC


Albumin Human 200 ml @  200 mls/hr 1X  ONCE IV ;  Start 4/8/20 at 08:00;  Stop 

4/8/20 at 08:53;  Status DC


Diphenhydramine HCl (Benadryl) 25 mg 1X PRN  PRN IV ITCHING;  Start 4/8/20 at 

08:00;  Stop 4/9/20 at 07:59;  Status DC


Diphenhydramine HCl (Benadryl) 25 mg 1X PRN  PRN IV ITCHING;  Start 4/8/20 at 

08:00;  Stop 4/9/20 at 07:59;  Status DC


Info (PHARMACY MONITORING -- do not chart) 1 each PRN DAILY  PRN MC SEE 

COMMENTS;  Start 4/8/20 at 08:00;  Stop 4/9/20 at 08:16;  Status DC


Albumin Human 50 ml @ 50 mls/hr 1X  ONCE IV ;  Start 4/8/20 at 08:53;  Stop 

4/8/20 at 08:56;  Status DC


Albumin Human 200 ml @  50 mls/hr PRN 1X  PRN IV HYPOTENSION Last administered 

on 4/14/20at 11:54;  Start 4/8/20 at 09:00


Meropenem 500 mg/ Sodium Chloride 50 ml @  100 mls/hr Q12H IV  Last administered

on 4/28/20at 10:45;  Start 4/8/20 at 10:00;  Stop 4/28/20 at 12:37;  Status DC


Sodium Chloride 90 meq/Magnesium Sulfate 12 meq/ Calcium Gluconate 15 meq/ 

Multivitamins 10 ml/Chromium/ Copper/Manganese/ Seleni/Zn 0.5 ml/ Insulin Human 

Regular 25 unit/ Total Parenteral Nutrition/Amino Acids/Dextrose/ Fat Emulsion 

Intravenous 1,400 ml @  58.333 mls/ hr TPN  CONT IV  Last administered on 

4/8/20at 21:41;  Start 4/8/20 at 22:00;  Stop 4/9/20 at 21:59;  Status DC


Sodium Chloride 1,000 ml @  1,000 mls/hr Q1H PRN IV hypotension;  Start 4/9/20 

at 07:58;  Stop 4/9/20 at 13:57;  Status DC


Albumin Human 200 ml @  200 mls/hr 1X PRN  PRN IV Hypotension Last administered 

on 4/9/20at 09:30;  Start 4/9/20 at 08:00;  Stop 4/9/20 at 13:59;  Status DC


Sodium Chloride 1,000 ml @  400 mls/hr Q2H30M PRN IV PATENCY;  Start 4/9/20 at 

07:58;  Stop 4/9/20 at 19:57;  Status DC


Info (PHARMACY MONITORING -- do not chart) 1 each PRN DAILY  PRN MC SEE 

COMMENTS;  Start 4/9/20 at 08:00;  Status Cancel


Info (PHARMACY MONITORING -- do not chart) 1 each PRN DAILY  PRN MC SEE 

COMMENTS;  Start 4/9/20 at 08:15;  Status UNV


Sodium Chloride 90 meq/Potassium Phosphate 5 mmol/ Magnesium Sulfate 12 

meq/Calcium Gluconate 15 meq/ Multivitamins 10 ml/Chromium/ Copper/Manganese/ 

Seleni/Zn 0.5 ml/ Insulin Human Regular 30 unit/ Total Parenteral 

Nutrition/Amino Acids/Dextrose/ Fat Emulsion Intravenous 1,400 ml @  58.333 mls/

hr TPN  CONT IV  Last administered on 4/9/20at 22:08;  Start 4/9/20 at 22:00;  

Stop 4/10/20 at 21:59;  Status DC


Linezolid/Dextrose 300 ml @  300 mls/hr Q12HR IV  Last administered on 4/20/20at

20:40;  Start 4/10/20 at 11:00;  Stop 4/21/20 at 08:10;  Status DC


Sodium Chloride 90 meq/Potassium Phosphate 15 mmol/ Magnesium Sulfate 12 

meq/Calcium Gluconate 15 meq/ Multivitamins 10 ml/Chromium/ Copper/Manganese/ 

Seleni/Zn 0.5 ml/ Insulin Human Regular 30 unit/ Total Parenteral 

Nutrition/Amino Acids/Dextrose/ Fat Emulsion Intravenous 1,400 ml @  58.333 mls/

hr TPN  CONT IV  Last administered on 4/10/20at 21:49;  Start 4/10/20 at 22:00; 

Stop 4/11/20 at 21:59;  Status DC


Sodium Chloride 90 meq/Potassium Phosphate 15 mmol/ Magnesium Sulfate 12 meq/C

alcium Gluconate 15 meq/ Multivitamins 10 ml/Chromium/ Copper/Manganese/ 

Seleni/Zn 0.5 ml/ Insulin Human Regular 40 unit/ Total Parenteral 

Nutrition/Amino Acids/Dextrose/ Fat Emulsion Intravenous 1,400 ml @  58.333 mls/

hr TPN  CONT IV  Last administered on 4/11/20at 21:21;  Start 4/11/20 at 22:00; 

Stop 4/12/20 at 21:59;  Status DC


Sodium Chloride 1,000 ml @  1,000 mls/hr Q1H PRN IV hypotension;  Start 4/11/20 

at 13:26;  Stop 4/11/20 at 19:25;  Status DC


Albumin Human 200 ml @  200 mls/hr 1X PRN  PRN IV Hypotension Last administered 

on 4/11/20at 15:00;  Start 4/11/20 at 13:30;  Stop 4/11/20 at 19:29;  Status DC


Sodium Chloride (Normal Saline Flush) 10 ml 1X PRN  PRN IV AP catheter pack;  

Start 4/11/20 at 13:30;  Stop 4/12/20 at 13:29;  Status DC


Sodium Chloride (Normal Saline Flush) 10 ml 1X PRN  PRN IV  catheter pack;  

Start 4/11/20 at 13:30;  Stop 4/12/20 at 13:29;  Status DC


Sodium Chloride 1,000 ml @  400 mls/hr Q2H30M PRN IV PATENCY;  Start 4/11/20 at 

13:26;  Stop 4/12/20 at 01:25;  Status DC


Info (PHARMACY MONITORING -- do not chart) 1 each PRN DAILY  PRN MC SEE 

COMMENTS;  Start 4/11/20 at 13:30;  Stop 4/11/20 at 13:33;  Status DC


Info (PHARMACY MONITORING -- do not chart) 1 each PRN DAILY  PRN MC SEE 

COMMENTS;  Start 4/11/20 at 13:30;  Stop 4/11/20 at 13:34;  Status DC


Sodium Chloride 90 meq/Potassium Phosphate 19 mmol/ Magnesium Sulfate 12 

meq/Calcium Gluconate 15 meq/ Multivitamins 10 ml/Chromium/ Copper/Manganese/ 

Seleni/Zn 0.5 ml/ Insulin Human Regular 40 unit/ Total Parenteral 

Nutrition/Amino Acids/Dextrose/ Fat Emulsion Intravenous 1,400 ml @  58.333 mls/

hr TPN  CONT IV  Last administered on 4/12/20at 21:54;  Start 4/12/20 at 22:00; 

Stop 4/13/20 at 21:59;  Status DC


Sodium Chloride 1,000 ml @  1,000 mls/hr Q1H PRN IV hypotension;  Start 4/13/20 

at 09:35;  Stop 4/13/20 at 15:34;  Status DC


Albumin Human 200 ml @  200 mls/hr 1X PRN  PRN IV Hypotension;  Start 4/13/20 at

09:45;  Stop 4/13/20 at 15:44;  Status DC


Diphenhydramine HCl (Benadryl) 25 mg 1X PRN  PRN IV ITCHING;  Start 4/13/20 at 

09:45;  Stop 4/14/20 at 09:44;  Status DC


Diphenhydramine HCl (Benadryl) 25 mg 1X PRN  PRN IV ITCHING;  Start 4/13/20 at 

09:45;  Stop 4/14/20 at 09:44;  Status DC


Sodium Chloride 1,000 ml @  400 mls/hr Q2H30M PRN IV PATENCY;  Start 4/13/20 at 

09:35;  Stop 4/13/20 at 21:34;  Status DC


Info (PHARMACY MONITORING -- do not chart) 1 each PRN DAILY  PRN MC SEE 

COMMENTS;  Start 4/13/20 at 09:45;  Status Cancel


Sodium Chloride 100 meq/Potassium Phosphate 19 mmol/ Magnesium Sulfate 12 

meq/Calcium Gluconate 15 meq/ Multivitamins 10 ml/Chromium/ Copper/Manganese/ 

Seleni/Zn 0.5 ml/ Insulin Human Regular 40 unit/ Potassium Chloride 20 meq/ 

Total Parenteral Nutrition/Amino Acids/Dextrose/ Fat Emulsion Intravenous 1,400 

ml @  58.333 mls/ hr TPN  CONT IV  Last administered on 4/13/20at 22:02;  Start 

4/13/20 at 22:00;  Stop 4/14/20 at 21:59;  Status DC


Furosemide (Lasix) 40 mg 1X  ONCE IVP  Last administered on 4/13/20at 14:39;  

Start 4/13/20 at 14:30;  Stop 4/13/20 at 14:31;  Status DC


Metronidazole 100 ml @  100 mls/hr Q8HRS IV  Last administered on 4/21/20at 

06:04;  Start 4/14/20 at 10:00;  Stop 4/21/20 at 08:10;  Status DC


Sodium Chloride 1,000 ml @  1,000 mls/hr Q1H PRN IV hypotension;  Start 4/14/20 

at 08:00;  Stop 4/14/20 at 13:59;  Status DC


Albumin Human 200 ml @  200 mls/hr 1X PRN  PRN IV Hypotension;  Start 4/14/20 at

08:00;  Stop 4/14/20 at 13:59;  Status DC


Sodium Chloride 1,000 ml @  400 mls/hr Q2H30M PRN IV PATENCY;  Start 4/14/20 at 

08:00;  Stop 4/14/20 at 19:59;  Status DC


Info (PHARMACY MONITORING -- do not chart) 1 each PRN DAILY  PRN MC SEE 

COMMENTS;  Start 4/14/20 at 11:30;  Status UNV


Info (PHARMACY MONITORING -- do not chart) 1 each PRN DAILY  PRN MC SEE 

COMMENTS;  Start 4/14/20 at 11:30;  Stop 4/16/20 at 12:13;  Status DC


Sodium Chloride 100 meq/Potassium Phosphate 19 mmol/ Magnesium Sulfate 12 

meq/Calcium Gluconate 15 meq/ Multivitamins 10 ml/Chromium/ Copper/Manganese/ 

Seleni/Zn 0.5 ml/ Insulin Human Regular 40 unit/ Potassium Chloride 20 meq/ 

Total Parenteral Nutrition/Amino Acids/Dextrose/ Fat Emulsion Intravenous 1,400 

ml @  58.333 mls/ hr TPN  CONT IV  Last administered on 4/14/20at 21:52;  Start 

4/14/20 at 22:00;  Stop 4/15/20 at 21:59;  Status DC


Sodium Chloride (Normal Saline Flush) 10 ml QSHIFT  PRN IV AFTER MEDS AND BLOOD 

DRAWS;  Start 4/14/20 at 15:00


Sodium Chloride (Normal Saline Flush) 10 ml PRN Q5MIN  PRN IV AFTER MEDS AND 

BLOOD DRAWS;  Start 4/14/20 at 15:00


Sodium Chloride (Normal Saline Flush) 20 ml PRN Q5MIN  PRN IV AFTER MEDS AND 

BLOOD DRAWS;  Start 4/14/20 at 15:00


Sodium Chloride 100 meq/Potassium Phosphate 19 mmol/ Magnesium Sulfate 12 

meq/Calcium Gluconate 15 meq/ Multivitamins 10 ml/Chromium/ Copper/Manganese/ 

Seleni/Zn 0.5 ml/ Insulin Human Regular 40 unit/ Potassium Chloride 20 meq/ 

Total Parenteral Nutrition/Amino Acids/Dextrose/ Fat Emulsion Intravenous 1,400 

ml @  58.333 mls/ hr TPN  CONT IV  Last administered on 4/15/20at 21:20;  Start 

4/15/20 at 22:00;  Stop 4/16/20 at 21:59;  Status DC


Lidocaine HCl (Buffered Lidocaine 1%) 3 ml STK-MED ONCE .ROUTE ;  Start 4/15/20 

at 13:16;  Stop 4/15/20 at 13:16;  Status DC


Lidocaine HCl (Buffered Lidocaine 1%) 6 ml 1X  ONCE INJ  Last administered on 

4/15/20at 13:45;  Start 4/15/20 at 13:30;  Stop 4/15/20 at 13:31;  Status DC


Albumin Human 100 ml @  100 mls/hr 1X  ONCE IV  Last administered on 4/15/20at 

15:41;  Start 4/15/20 at 15:00;  Stop 4/15/20 at 15:59;  Status DC


Albumin Human 50 ml @ 50 mls/hr 1X  ONCE IV  Last administered on 4/15/20at 

15:00;  Start 4/15/20 at 15:00;  Stop 4/15/20 at 15:59;  Status DC


Info (PHARMACY MONITORING -- do not chart) 1 each PRN DAILY  PRN MC SEE 

COMMENTS;  Start 4/16/20 at 11:30;  Status Cancel


Info (PHARMACY MONITORING -- do not chart) 1 each PRN DAILY  PRN MC SEE 

COMMENTS;  Start 4/16/20 at 11:30;  Status UNV


Sodium Chloride 100 meq/Potassium Phosphate 10 mmol/ Magnesium Sulfate 12 

meq/Calcium Gluconate 15 meq/ Multivitamins 10 ml/Chromium/ Copper/Manganese/ 

Seleni/Zn 0.5 ml/ Insulin Human Regular 35 unit/ Potassium Chloride 20 meq/ 

Total Parenteral Nutrition/Amino Acids/Dextrose/ Fat Emulsion Intravenous 1,400 

ml @  58.333 mls/ hr TPN  CONT IV  Last administered on 4/16/20at 22:10;  Start 

4/16/20 at 22:00;  Stop 4/17/20 at 21:59;  Status DC


Sodium Chloride 100 meq/Potassium Phosphate 5 mmol/ Magnesium Sulfate 12 

meq/Calcium Gluconate 15 meq/ Multivitamins 10 ml/Chromium/ Copper/Manganese/ 

Seleni/Zn 0.5 ml/ Insulin Human Regular 35 unit/ Potassium Chloride 20 meq/ 

Total Parenteral Nutrition/Amino Acids/Dextrose/ Fat Emulsion Intravenous 1,400 

ml @  58.333 mls/ hr TPN  CONT IV  Last administered on 4/17/20at 22:59;  Start 

4/17/20 at 22:00;  Stop 4/18/20 at 21:59;  Status DC


Sodium Chloride 1,000 ml @  1,000 mls/hr Q1H PRN IV hypotension;  Start 4/18/20 

at 08:27;  Stop 4/18/20 at 14:26;  Status DC


Albumin Human 200 ml @  200 mls/hr 1X PRN  PRN IV Hypotension Last administered 

on 4/18/20at 09:18;  Start 4/18/20 at 08:30;  Stop 4/18/20 at 14:29;  Status DC


Sodium Chloride 1,000 ml @  400 mls/hr Q2H30M PRN IV PATENCY;  Start 4/18/20 at 

08:27;  Stop 4/18/20 at 20:26;  Status DC


Info (PHARMACY MONITORING -- do not chart) 1 each PRN DAILY  PRN MC SEE 

COMMENTS;  Start 4/18/20 at 08:30;  Status Cancel


Info (PHARMACY MONITORING -- do not chart) 1 each PRN DAILY  PRN MC SEE 

COMMENTS;  Start 4/18/20 at 08:30;  Stop 4/26/20 at 13:10;  Status DC


Sodium Chloride 100 meq/Potassium Chloride 40 meq/ Magnesium Sulfate 15 

meq/Calcium Gluconate 15 meq/ Multivitamins 10 ml/Chromium/ Copper/Manganese/ 

Seleni/Zn 0.5 ml/ Insulin Human Regular 35 unit/ Total Parenteral 

Nutrition/Amino Acids/Dextrose/ Fat Emulsion Intravenous 1,400 ml @  58.333 mls/

hr TPN  CONT IV  Last administered on 4/18/20at 22:00;  Start 4/18/20 at 22:00; 

Stop 4/19/20 at 21:59;  Status DC


Potassium Chloride/Water 100 ml @  100 mls/hr 1X  ONCE IV  Last administered on 

4/18/20at 17:28;  Start 4/18/20 at 14:45;  Stop 4/18/20 at 15:44;  Status DC


Sodium Chloride 100 meq/Potassium Chloride 40 meq/ Magnesium Sulfate 15 

meq/Calcium Gluconate 15 meq/ Multivitamins 10 ml/Chromium/ Copper/Manganese/ 

Seleni/Zn 0.5 ml/ Insulin Human Regular 35 unit/ Total Parenteral 

Nutrition/Amino Acids/Dextrose/ Fat Emulsion Intravenous 1,400 ml @  58.333 mls/

hr TPN  CONT IV  Last administered on 4/19/20at 22:46;  Start 4/19/20 at 22:00; 

Stop 4/20/20 at 21:59;  Status DC


Sodium Chloride 100 meq/Potassium Chloride 40 meq/ Magnesium Sulfate 20 

meq/Calcium Gluconate 15 meq/ Multivitamins 10 ml/Chromium/ Copper/Manganese/ 

Seleni/Zn 0.5 ml/ Insulin Human Regular 35 unit/ Total Parenteral 

Nutrition/Amino Acids/Dextrose/ Fat Emulsion Intravenous 1,400 ml @  58.333 mls/

hr TPN  CONT IV  Last administered on 4/20/20at 22:31;  Start 4/20/20 at 22:00; 

Stop 4/21/20 at 21:59;  Status DC


Fentanyl Citrate (Fentanyl 2ml Vial) 50 mcg PRN Q2HR  PRN IVP PAIN Last 

administered on 4/27/20at 13:32;  Start 4/20/20 at 21:00;  Stop 4/28/20 at 

12:53;  Status DC


Fentanyl Citrate (Fentanyl 2ml Vial) 25 mcg PRN Q2HR  PRN IVP PAIN;  Start 

4/20/20 at 21:00;  Stop 4/28/20 at 12:54;  Status DC


Enoxaparin Sodium (Lovenox 100mg Syringe) 100 mg Q12HR SQ ;  Start 4/21/20 at 

21:00;  Status UNV


Amino Acids/ Glycerin/ Electrolytes 1,000 ml @  75 mls/hr [id number] IV ;  Start 

4/20/20 at 21:15;  Status UNV


Sodium Chloride 1,000 ml @  1,000 mls/hr Q1H PRN IV hypotension;  Start 4/21/20 

at 07:56;  Stop 4/21/20 at 13:55;  Status DC


Albumin Human 200 ml @  200 mls/hr 1X PRN  PRN IV Hypotension Last administered 

on 4/21/20at 08:40;  Start 4/21/20 at 08:00;  Stop 4/21/20 at 13:59;  Status DC


Sodium Chloride 1,000 ml @  400 mls/hr Q2H30M PRN IV PATENCY;  Start 4/21/20 at 

07:56;  Stop 4/21/20 at 19:55;  Status DC


Info (PHARMACY MONITORING -- do not chart) 1 each PRN DAILY  PRN MC SEE 

COMMENTS;  Start 4/21/20 at 08:00;  Status UNV


Info (PHARMACY MONITORING -- do not chart) 1 each PRN DAILY  PRN MC SEE 

COMMENTS;  Start 4/21/20 at 08:00;  Status UNV


Daptomycin 430 mg/ Sodium Chloride 50 ml @  100 mls/hr Q24H IV  Last 

administered on 4/21/20at 12:35;  Start 4/21/20 at 09:00;  Stop 4/21/20 at 

12:49;  Status DC


Sodium Chloride 100 meq/Potassium Chloride 40 meq/ Magnesium Sulfate 20 

meq/Calcium Gluconate 15 meq/ Multivitamins 10 ml/Chromium/ Copper/Manganese/ 

Seleni/Zn 0.5 ml/ Insulin Human Regular 35 unit/ Total Parenteral 

Nutrition/Amino Acids/Dextrose/ Fat Emulsion Intravenous 1,400 ml @  58.333 mls/

hr TPN  CONT IV  Last administered on 4/21/20at 21:26;  Start 4/21/20 at 22:00; 

Stop 4/22/20 at 21:59;  Status DC


Daptomycin 430 mg/ Sodium Chloride 50 ml @  100 mls/hr Q48H IV ;  Start 4/23/20 

at 09:00;  Stop 4/22/20 at 11:55;  Status DC


Sodium Chloride 100 meq/Potassium Chloride 40 meq/ Magnesium Sulfate 20 

meq/Calcium Gluconate 15 meq/ Multivitamins 10 ml/Chromium/ Copper/Manganese/ 

Seleni/Zn 0.5 ml/ Insulin Human Regular 35 unit/ Total Parenteral 

Nutrition/Amino Acids/Dextrose/ Fat Emulsion Intravenous 1,400 ml @  58.333 mls/

hr TPN  CONT IV  Last administered on 4/22/20at 22:27;  Start 4/22/20 at 22:00; 

Stop 4/23/20 at 21:59;  Status DC


Daptomycin 430 mg/ Sodium Chloride 50 ml @  100 mls/hr Q24H IV  Last 

administered on 4/24/20at 15:07;  Start 4/22/20 at 13:00;  Stop 4/25/20 at 

13:15;  Status DC


Sodium Chloride 100 meq/Potassium Chloride 40 meq/ Magnesium Sulfate 20 m

eq/Calcium Gluconate 10 meq/ Multivitamins 10 ml/Chromium/ Copper/Manganese/ 

Seleni/Zn 0.5 ml/ Insulin Human Regular 35 unit/ Total Parenteral 

Nutrition/Amino Acids/Dextrose/ Fat Emulsion Intravenous 1,400 ml @  58.333 mls/

hr TPN  CONT IV  Last administered on 4/24/20at 00:06;  Start 4/23/20 at 22:00; 

Stop 4/24/20 at 21:59;  Status DC


Alteplase, Recombinant (Cathflo For Central Catheter Clearance) 1 mg 1X  ONCE 

INT CAT  Last administered on 4/24/20at 11:44;  Start 4/24/20 at 10:45;  Stop 

4/24/20 at 10:46;  Status DC


Ondansetron HCl (Zofran) 4 mg PRN Q6HRS  PRN IV NAUSEA/VOMITING;  Start 4/27/20 

at 07:00;  Stop 4/28/20 at 06:59;  Status DC


Fentanyl Citrate (Fentanyl 2ml Vial) 25 mcg PRN Q5MIN  PRN IV MILD PAIN 1-3;  

Start 4/27/20 at 07:00;  Stop 4/28/20 at 06:59;  Status DC


Fentanyl Citrate (Fentanyl 2ml Vial) 50 mcg PRN Q5MIN  PRN IV MODERATE TO SEVERE

PAIN Last administered on 4/27/20at 10:17;  Start 4/27/20 at 07:00;  Stop 

4/28/20 at 06:59;  Status DC


Ringer's Solution 1,000 ml @  30 mls/hr Q24H IV ;  Start 4/27/20 at 07:00;  Stop

4/27/20 at 18:59;  Status DC


Lidocaine HCl (Xylocaine-Mpf 1% 2ml Vial) 2 ml PRN 1X  PRN ID PRIOR TO IV START;

 Start 4/27/20 at 07:00;  Stop 4/28/20 at 06:59;  Status DC


Prochlorperazine Edisylate (Compazine) 5 mg PACU PRN  PRN IV NAUSEA, MRX1;  

Start 4/27/20 at 07:00;  Stop 4/28/20 at 06:59;  Status DC


Sodium Acetate 50 meq/Potassium Acetate 55 meq/ Magnesium Sulfate 20 meq/Calcium

Gluconate 10 meq/ Multivitamins 10 ml/Chromium/ Copper/Manganese/ Seleni/Zn 0.5 

ml/ Insulin Human Regular 35 unit/ Total Parenteral Nutrition/Amino 

Acids/Dextrose/ Fat Emulsion Intravenous 1,400 ml @  58.333 mls/ hr TPN  CONT IV

;  Start 4/24/20 at 22:00;  Stop 4/24/20 at 14:15;  Status DC


Sodium Acetate 50 meq/Potassium Acetate 55 meq/ Magnesium Sulfate 20 meq/Calcium

Gluconate 10 meq/ Multivitamins 10 ml/Chromium/ Copper/Manganese/ Seleni/Zn 0.5 

ml/ Insulin Human Regular 35 unit/ Total Parenteral Nutrition/Amino 

Acids/Dextrose/ Fat Emulsion Intravenous 1,800 ml @  75 mls/hr TPN  CONT IV  

Last administered on 4/24/20at 22:38;  Start 4/24/20 at 22:00;  Stop 4/25/20 at 

21:59;  Status DC


Sodium Chloride 1,000 ml @  1,000 mls/hr Q1H PRN IV hypotension;  Start 4/24/20 

at 15:31;  Stop 4/24/20 at 21:30;  Status DC


Diphenhydramine HCl (Benadryl) 25 mg 1X PRN  PRN IV ITCHING;  Start 4/24/20 at 

15:45;  Stop 4/25/20 at 15:44;  Status DC


Diphenhydramine HCl (Benadryl) 25 mg 1X PRN  PRN IV ITCHING;  Start 4/24/20 at 

15:45;  Stop 4/25/20 at 15:44;  Status DC


Sodium Chloride 1,000 ml @  400 mls/hr Q2H30M PRN IV PATENCY;  Start 4/24/20 at 

15:31;  Stop 4/25/20 at 03:30;  Status DC


Info (PHARMACY MONITORING -- do not chart) 1 each PRN DAILY  PRN MC SEE 

COMMENTS;  Start 4/24/20 at 15:45


Sodium Acetate 50 meq/Potassium Acetate 55 meq/ Magnesium Sulfate 20 meq/Calcium

Gluconate 10 meq/ Multivitamins 10 ml/Chromium/ Copper/Manganese/ Seleni/Zn 0.5 

ml/ Insulin Human Regular 35 unit/ Total Parenteral Nutrition/Amino 

Acids/Dextrose/ Fat Emulsion Intravenous 1,800 ml @  75 mls/hr TPN  CONT IV  

Last administered on 4/25/20at 22:03;  Start 4/25/20 at 22:00;  Stop 4/26/20 at 

21:59;  Status DC


Daptomycin 430 mg/ Sodium Chloride 50 ml @  100 mls/hr Q24H IV  Last 

administered on 4/28/20at 14:32;  Start 4/25/20 at 13:00


Heparin Sodium (Porcine) 1000 unit/Sodium Chloride 1,001 ml @  1,001 mls/hr 1X  

ONCE IRR ;  Start 4/27/20 at 06:00;  Stop 4/27/20 at 06:59;  Status DC


Potassium Acetate 55 meq/Magnesium Sulfate 20 meq/ Calcium Gluconate 10 meq/ 

Multivitamins 10 ml/Chromium/ Copper/Manganese/ Seleni/Zn 0.5 ml/ Insulin Human 

Regular 35 unit/ Total Parenteral Nutrition/Amino Acids/Dextrose/ Fat Emulsion 

Intravenous 1,920 ml @  80 mls/hr TPN  CONT IV  Last administered on 4/26/20at 

22:10;  Start 4/26/20 at 22:00;  Stop 4/27/20 at 21:59;  Status DC


Dexamethasone Sodium Phosphate (Decadron) 4 mg STK-MED ONCE .ROUTE ;  Start 

4/27/20 at 10:56;  Stop 4/27/20 at 10:57;  Status DC


Ondansetron HCl (Zofran) 4 mg STK-MED ONCE .ROUTE ;  Start 4/27/20 at 10:56;  

Stop 4/27/20 at 10:57;  Status DC


Rocuronium Bromide (Zemuron) 50 mg STK-MED ONCE .ROUTE ;  Start 4/27/20 at 

10:56;  Stop 4/27/20 at 10:57;  Status DC


Fentanyl Citrate (Fentanyl 2ml Vial) 100 mcg STK-MED ONCE .ROUTE ;  Start 4/ 27/20 at 10:56;  Stop 4/27/20 at 10:57;  Status DC


Bupivacaine HCl/ Epinephrine Bitart (Sensorcain-Epi 0.5%-1:089348 Mpf) 30 ml 

STK-MED ONCE .ROUTE  Last administered on 4/27/20at 12:01;  Start 4/27/20 at 

10:58;  Stop 4/27/20 at 10:58;  Status DC


Cellulose (Surgicel Hemostat 2x14) 1 each STK-MED ONCE .ROUTE ;  Start 4/27/20 

at 10:58;  Stop 4/27/20 at 10:59;  Status DC


Iohexol (Omnipaque 300 Mg/ml) 50 ml STK-MED ONCE .ROUTE ;  Start 4/27/20 at 

10:58;  Stop 4/27/20 at 10:59;  Status DC


Cellulose (Surgicel Hemostat 4x8) 1 each STK-MED ONCE .ROUTE ;  Start 4/27/20 at

10:58;  Stop 4/27/20 at 10:59;  Status DC


Bisacodyl (Dulcolax Supp) 10 mg STK-MED ONCE .ROUTE ;  Start 4/27/20 at 10:59;  

Stop 4/27/20 at 10:59;  Status DC


Heparin Sodium (Porcine) 1000 unit/Sodium Chloride 1,001 ml @  1,001 mls/hr 1X  

ONCE IRR ;  Start 4/27/20 at 12:00;  Stop 4/27/20 at 12:59;  Status DC


Propofol 20 ml @ As Directed STK-MED ONCE IV ;  Start 4/27/20 at 11:05;  Stop 

4/27/20 at 11:05;  Status DC


Sevoflurane (Ultane) 90 ml STK-MED ONCE IH ;  Start 4/27/20 at 11:05;  Stop 

4/27/20 at 11:05;  Status DC


Sevoflurane (Ultane) 60 ml STK-MED ONCE IH ;  Start 4/27/20 at 12:26;  Stop 

4/27/20 at 12:27;  Status DC


Propofol 20 ml @ As Directed STK-MED ONCE IV ;  Start 4/27/20 at 12:26;  Stop 

4/27/20 at 12:27;  Status DC


Phenylephrine HCl (PHENYLEPHRINE in 0.9% NACL PF) 1 mg STK-MED ONCE IV ;  Start 

4/27/20 at 12:34;  Stop 4/27/20 at 12:34;  Status DC


Heparin Sodium (Porcine) (Heparin Sodium) 5,000 unit Q12HR SQ  Last administered

on 4/29/20at 08:44;  Start 4/27/20 at 21:00


Sodium Chloride (Normal Saline Flush) 3 ml QSHIFT  PRN IV AFTER MEDS AND BLOOD 

DRAWS;  Start 4/27/20 at 13:45


Naloxone HCl (Narcan) 0.4 mg PRN Q2MIN  PRN IV SEE INSTRUCTIONS;  Start 4/27/20 

at 13:45


Sodium Chloride 1,000 ml @  25 mls/hr Q24H IV  Last administered on 4/28/20at 

13:37;  Start 4/27/20 at 13:37


Naloxone HCl (Narcan) 0.4 mg PRN Q2MIN  PRN IV SEE INSTRUCTIONS;  Start 4/27/20 

at 14:30;  Status UNV


Sodium Chloride 1,000 ml @  25 mls/hr Q24H IV ;  Start 4/27/20 at 14:30;  Status

UNV


Hydromorphone HCl 30 ml @ 0 mls/hr CONT PRN  PRN IV PER PROTOCOL Last 

administered on 4/28/20at 12:27;  Start 4/27/20 at 14:30


Potassium Acetate 55 meq/Magnesium Sulfate 20 meq/ Calcium Gluconate 10 meq/ 

Multivitamins 10 ml/Chromium/ Copper/Manganese/ Seleni/Zn 0.5 ml/ Insulin Human 

Regular 35 unit/ Total Parenteral Nutrition/Amino Acids/Dextrose/ Fat Emulsion 

Intravenous 1,920 ml @  80 mls/hr TPN  CONT IV  Last administered on 4/27/20at 

22:01;  Start 4/27/20 at 22:00;  Stop 4/28/20 at 21:59;  Status DC


Bumetanide (Bumex) 2 mg [id number] IV  Last administered on 4/29/20at 08:42;  Start 

4/28/20 at 14:00


Meropenem 1 gm/ Sodium Chloride 100 ml @  200 mls/hr Q8HRS IV  Last administered

on 4/29/20at 05:23;  Start 4/28/20 at 14:00


Potassium Acetate 55 meq/Magnesium Sulfate 20 meq/ Calcium Gluconate 10 meq/ 

Multivitamins 10 ml/Chromium/ Copper/Manganese/ Seleni/Zn 0.5 ml/ Insulin Human 

Regular 35 unit/ Total Parenteral Nutrition/Amino Acids/Dextrose/ Fat Emulsion 

Intravenous 1,920 ml @  80 mls/hr TPN  CONT IV  Last administered on 4/28/20at 

22:02;  Start 4/28/20 at 22:00;  Stop 4/29/20 at 21:59


Hydromorphone HCl (Dilaudid Standard PCA) 12 mg STK-MED ONCE IV ;  Start 4/27/20

at 14:35;  Stop 4/28/20 at 13:53;  Status DC


Artificial Tears (Artificial Tears) 1 drop PRN Q15MIN  PRN OU DRY EYE;  Start 

4/29/20 at 05:30





Active Scripts


Active


Reported


Bisoprolol Fumarate 5 Mg Tablet 10 Mg PO DAILY


Vitals/I & O





Vital Sign - Last 24 Hours








 4/28/20 4/28/20 4/28/20 4/28/20





 09:24 10:00 10:49 11:00


 


Pulse  106  92


 


Resp  21  18


 


B/P (MAP)  108/87 (94)  140/63 (88)


 


Pulse Ox 99 100 97 98


 


O2 Delivery Ventilator Ventilator Ventilator Ventilator





 4/28/20 4/28/20 4/28/20 4/28/20





 12:00 12:00 12:27 13:00


 


Temp 100.3   





 100.3   


 


Pulse 140   


 


Resp 26  19 21


 


B/P (MAP) 153/101 (118)   


 


Pulse Ox 99  98 100


 


O2 Delivery Ventilator Mechanical Ventilator BiPAP/CPAP Ventilator


 


    





    





 4/28/20 4/28/20 4/28/20 4/28/20





 13:00 13:13 14:00 14:50


 


Pulse 94  96 


 


Resp 19  22 


 


B/P (MAP) 154/81 (105)  117/73 (88) 


 


Pulse Ox 97 97 99 99


 


O2 Delivery Ventilator Ventilator Ventilator T-Tube


 


O2 Flow Rate    8.0





 4/28/20 4/28/20 4/28/20 4/28/20





 15:00 16:00 16:00 17:00


 


Temp    99.5





    99.5


 


Pulse 126 96  148


 


Resp 17 18  20


 


B/P (MAP) 194/88 (123) 127/90 (102)  184/87 (119)


 


Pulse Ox 96 98  95


 


O2 Delivery Tracheal Collar Tracheal Collar Trach Collar Tracheal Collar


 


    





    





 4/28/20 4/28/20 4/28/20 4/28/20





 18:00 19:00 19:50 20:00


 


Temp    99.6





    99.6


 


Pulse 80 148  111


 


Resp 17 18  20


 


B/P (MAP) 94/47 (63) 98/57 (71)  118/63 (81)


 


Pulse Ox 99 98 98 99


 


O2 Delivery Ventilator Ventilator Ventilator Ventilator


 


    





    





 4/28/20 4/28/20 4/28/20 4/28/20





 20:00 21:00 22:00 23:00


 


Pulse  88 86 92


 


Resp  18 18 18


 


B/P (MAP)  94/47 (63) 86/45 (59) 83/43 (56)


 


Pulse Ox  98 97 96


 


O2 Delivery Trach Collar Ventilator Ventilator Ventilator





 4/29/20 4/29/20 4/29/20 4/29/20





 00:00 00:00 00:13 01:00


 


Temp  99.2  





  99.2  


 


Pulse  106  91


 


Resp  18  18


 


B/P (MAP)  137/85 (102)  107/56 (73)


 


Pulse Ox  97 98 98


 


O2 Delivery Trach Collar Ventilator Ventilator Ventilator


 


    





    





 4/29/20 4/29/20 4/29/20 4/29/20





 02:00 03:00 03:19 04:00


 


Pulse 83 83  


 


Resp 18 18  


 


B/P (MAP) 95/52 (66) 105/52 (69)  


 


Pulse Ox 98 98 98 


 


O2 Delivery Ventilator Ventilator  Trach Collar





 4/29/20 4/29/20 4/29/20 4/29/20





 04:00 05:00 06:00 07:00


 


Temp 100.6   





 100.6   


 


Pulse 100 88 85 80


 


Resp 22 18 18 17


 


B/P (MAP) 91/53 (66) 113/56 (75) 118/59 (78) 98/46 (63)


 


Pulse Ox 98 98 95 98


 


O2 Delivery Ventilator Ventilator Ventilator Ventilator


 


    





    





 4/29/20 4/29/20 4/29/20 





 07:57 08:00 08:00 


 


Temp  99.3  





  99.3  


 


Pulse  86  


 


Resp  17  


 


B/P (MAP)  140/86 (104)  


 


Pulse Ox 98 98  


 


O2 Delivery Ventilator Ventilator Mechanical Ventilator 














Intake and Output   


 


 4/28/20 4/28/20 4/29/20





 15:00 23:00 07:00


 


Intake Total 100 ml 1565 ml 1640.5 ml


 


Output Total 1015 ml 1190 ml 860 ml


 


Balance -915 ml 375 ml 780.5 ml








Problem List


Problems


Medical Problems:


(1) Acute pancreatitis


Status: Acute  





(2) Cholelithiasis


Status: Acute  








Assessment


Acute, severe, biliary pancreatitis with MOSF; some parts of her are better.


Continues with intermittent fever; no clear site of infection.


Plan of Care Note


Continue support.


Antibiotic holiday?  Allow any infection to be identified?





Hemodynamically unstable?:  No


Is patient in severe pain?:  No


Is NPO status required?:  Yes











MARCO ANTONIO LONGO MD          Apr 29, 2020 09:19

## 2020-04-29 NOTE — NUR
SS following up with discharge planning. SS reviewed pt chart and discussed with pt RN. SS 
has made attempts to contact pt's daughters. SS received no response from Beth. SS 
received phone contact from pt's daughter, Glenna. She reported that Beth has asked her to 
communicate with SS. Glenna reported that she can be reached at her work number, 
[phone]. Glenna reported that they have Courtship [employer] Guardianship papers now for pt. 
She reported that she would e-mail documents to SS. SS discussed pt being over income for 
Medicaid. Pt's daughter reported understanding that without insurance pt cannot be placed in 
a facility. Pt's daughter requesting that hospital assist them with applying for disability. 
SS discussed with Dr. Yang and he is willing to complete physicians statement. SS 
discussed with [facility]. SS was notified that they will staff with there director as they are 
unsure if she has a qualifying diagnosis. SS will continue to follow for discharge planning.

## 2020-04-29 NOTE — PATHOLOGY
[facility] Accession Number: [id number]

.                                                                01

Material submitted:                                        .

abdomen - ABDOMINAL DEBRIDEMENT

.                                                                01

Clinical history:                                          .

Acute pancreatitis, cholecystitis

.                                                                02

**********************************************************************

Diagnosis:

Abdominal debridement:

- Segments of fibrinous and necrotic debris with focal acute inflammation.

See comment.

(JPM:alex; 04/28/2020)

QMS  04/28/2020  1403 Local

**********************************************************************

.                                                                02

Comment:

Sections of the abdominal debridement reveal segments of fibrinous and

necrotic debris with focal acue inflammation.  There appear to be focal

clusters of small yeast within the debris.  A properly controlled GMS

stain for yeast/fungi is obtained and yields the following results:

.

GMS for yeast/fungi (A1):  Clusters of small yeast confirmed within

debris.

(JPM:alex; 04/28/2020)

.

.

Special stain performed:  GMS for yeast-fungi

.                                                                02

Electronically signed:                                     .

Jonah Tello MD, Pathologist

NPI- [id number]

.                                                                01

Gross description:                                         .

The specimen is received in formalin, labeled "Edel Tadeo, abdominal

debridement" and consists of necrotic tan-brown tissue measuring 2.8 x 1.1

x 0.5 cm which is entirely submitted in A1.

(SDY; 4/27/2020)

SYU/SYU  04/27/2020  1657 Local

.                                                                02

Pathologist provided ICD-10:

K85.90, K81.1

.                                                                02

CPT                                                        .

608012, [id number]

Specimen Comment: A courtesy copy of this report has been sent to [phone], [phone]

Specimen Comment: [id number], [phone]

Specimen Comment: Report sent to ,DR MASON / DR HERNANDEZ

***Performed at:  [address] [place] [address]

   [address], [place], [place]  [id number]

   MD Duke Lozano MD Phone:  [phone]

***Performed at:  02

   [address]  [id number]

   MD Jonah Tello MD Phone:  [phone]

## 2020-04-29 NOTE — PDOC
PROGRESS NOTES


Chief Complaint


Chief Complaint


Acute hypoxic Respiratory failure requiring mechanical ventilation (on vent 

since 3/23)


Tracheostomy


bilateral pleural effusions/pulm edema 


Sepsis


Severe Acute gallstone pancreatitis (not a surgical candidate at this time) with

necrosis


Acute kidney failure now requiring dialysis


Salpingitis


Gallstones (Calculus of gallbladder with acute cholecystitis without 

obstruction)


HTN 


Leukocytosis 


Hypoxia


Uterine fibroid


Intractable pain


Intractable nausea


Covid 19 negative. 


Acute on chronic anemia 


EEG: No seizure activity


ESRD on HD


Hyperglycemia





History of Present Illness


History of Present Illness


Ms Tadeo is a 50yo F w/ PMHx HTN, prediabetes who presented to the emergency 

room with complaints of abdominal pain on 3/16/2020. Found with Lipase 90821, 

, , Bilirubin 1.4.


CT abdomen confirms pancreatic inflammation, peripancreatic fluid and 

inflammatory changes around the pancreas consistent with pancreatitis. 

Cholelithiasis and 1.4cm uterine fibroid as well as possible left salpingitis. 

Admitted for further care


GI, General surgery, ID, Pulm consulted.





3/17: PICC placed per IR. Renal US negative. Started on levophed. Repeat CT 

abdomen w/ necrosis; 3/18: Dialysis catheter per nephrology; 3/19: On BiPAP; 

3/20: BiPAP, dialysis; 3/21: Overnight Tmax 101.7 , still on BiPAP FiO2 40%, 

still on low dose Levophed gtt, TPN initiated. On dialysis


4/6: Tracheostomy; 4/12: S/p tracheostomy on vent spontaneous respirations with 

5 of pressure support 35% FiO2, rectal tube and a Chino, off pressors; 

4/14:Still on vent via trach. Removed PICC and CVC LIJ and replaced. CT 

chest/abd/pelvis with bilateral pleural effusion and ascites.


4/15: Renal function stable. Still on vent. More interactive today. Miming wish 

for food. Plan discussed for thoracentesis/paracentesis with daughters today. 

They were under impression patient was doing worse due to a miscommunication 

which has been clarified over the phone. 4.3L removed.


4/17: Febrile overnight 101.8F. More interactive, still on vent. Asking for ice 

by miming; 4/18: Afebrile overnight. TMax last 24 hours 100.6F. Hb 7.1. 

Interactive when awake. 4/22: Transfusion 1u PRBC (6U total since admit)


4/23-4/26: TPN and precedex, vent.


4/27: Tmax 101F overnight. Hb 8.2. HD cath out since 4/24. Alert. On vent SIMV 

35% FiO2. Surgery: ex-lap, no naif or pancreatic necrosectomy 2/2 profound 

inflammation.


4/28: Seen POD #1. Afebrile overnight. BUN 62. CBC WNL today.Remains on vent via

trach, TPN. Able to point today and indicate she wishes her daughters and Rolf 

to be involved in her care.





Hb 7.4, Na 151. More relaxed today. Remains on vent via trach, TPN. off HD for 

now.





Plan:


Cont vent weaning


Trach shield during day if ok with pulm. Would recommend ABG after 4 hours to 

r/o CO2 retention, however and still vent overnight





Vitals


Vitals





Vital Signs








  Date Time  Temp Pulse Resp B/P (MAP) Pulse Ox O2 Delivery O2 Flow Rate FiO2


 


4/29/20 08:00      Mechanical Ventilator  


 


4/29/20 08:00 99.3 86 17 140/86 (104) 98   





 99.3       


 


4/28/20 14:50       8.0 











Physical Exam


Physical Exam


GENERAL: Propped up in bed, sedated weak appearing 


HEENT: Pupils equal, + NGT, oral cavity dry 


NECK:  Trach/vent 


LUNGS: rhonchi 


HEART:  S1, S2, regular 


ABDOMEN: Distended, hypoactive BS, drain placement (4/27 )


: Chino (4/14)


EXTREMITIES: Generalized edema, no cyanosis, SCDs bilaterally 


DERMATOLOGIC:  Warm and dry.  No generalized rash.  


CENTRAL NERVOUS SYSTEM: Extremely weak, nods to few simple questions 


HDC has been removed


LIJ (4/14) clean


General:  Alert, No acute distress


Heart:  Regular rate, Normal S1, Other (increased rate)


Lungs:  Crackles, Other (dimished in BLL  nc at perrl   nose clear   neck trach 

site ok   no lad  no thyromegaly)


Abdomen:  Soft, Other (ROBERT serous)


Extremities:  Other (ANASARCA)


Skin:  Other (mottling noted to extremities )





Labs


LABS





Laboratory Tests








Test


 4/28/20


12:08 4/28/20


18:44 4/28/20


23:40 4/29/20


06:10


 


Glucose (Fingerstick)


 148 mg/dL


(70-99) 116 mg/dL


(70-99) 94 mg/dL


(70-99) 





 


White Blood Count


 


 


 


 10.4 x10^3/uL


(4.0-11.0)


 


Red Blood Count


 


 


 


 2.51 x10^6/uL


(3.50-5.40)


 


Hemoglobin


 


 


 


 7.4 g/dL


(12.0-15.5)


 


Hematocrit


 


 


 


 22.9 %


(36.0-47.0)


 


Mean Corpuscular Volume    91 fL () 


 


Mean Corpuscular Hemoglobin    29 pg (25-35) 


 


Mean Corpuscular Hemoglobin


Concent 


 


 


 32 g/dL


(31-37)


 


Red Cell Distribution Width


 


 


 


 18.4 %


(11.5-14.5)


 


Platelet Count


 


 


 


 439 x10^3/uL


(140-400)


 


Sodium Level


 


 


 


 151 mmol/L


(136-145)


 


Potassium Level


 


 


 


 3.6 mmol/L


(3.5-5.1)


 


Chloride Level


 


 


 


 114 mmol/L


()


 


Carbon Dioxide Level


 


 


 


 29 mmol/L


(21-32)


 


Anion Gap    8 (6-14) 


 


Blood Urea Nitrogen


 


 


 


 58 mg/dL


(7-20)


 


Creatinine


 


 


 


 1.0 mg/dL


(0.6-1.0)


 


Estimated GFR


(Cockcroft-Gault) 


 


 


 58.9 





 


Glucose Level


 


 


 


 104 mg/dL


(70-99)


 


Calcium Level


 


 


 


 8.2 mg/dL


(8.5-10.1)


 


Test


 4/29/20


06:14 


 


 





 


Glucose (Fingerstick)


 87 mg/dL


(70-99) 


 


 














Assessment and Plan


Assessmemt and Plan


Problems


Medical Problems:


(1) Acute pancreatitis


Status: Acute  





(2) Cholelithiasis


Status: Acute  











Comment


Review of Relevant


I have reviewed the following items josy (where applicable) has been applied.


Labs





Laboratory Tests








Test


 4/27/20


13:40 4/27/20


18:04 4/28/20


00:57 4/28/20


07:00


 


Glucose (Fingerstick)


 163 mg/dL


(70-99) 208 mg/dL


(70-99) 208 mg/dL


(70-99) 





 


White Blood Count


 


 


 


 6.3 x10^3/uL


(4.0-11.0)


 


Red Blood Count


 


 


 


 4.12 x10^6/uL


(3.50-5.40)


 


Hemoglobin


 


 


 


 12.1 g/dL


(12.0-15.5)


 


Hematocrit


 


 


 


 37.5 %


(36.0-47.0)


 


Mean Corpuscular Volume    91 fL () 


 


Mean Corpuscular Hemoglobin    29 pg (25-35) 


 


Mean Corpuscular Hemoglobin


Concent 


 


 


 32 g/dL


(31-37)


 


Red Cell Distribution Width


 


 


 


 18.3 %


(11.5-14.5)


 


Platelet Count


 


 


 


 267 x10^3/uL


(140-400)


 


Neutrophils (%) (Auto)    74 % (31-73) 


 


Lymphocytes (%) (Auto)    20 % (24-48) 


 


Monocytes (%) (Auto)    5 % (0-9) 


 


Eosinophils (%) (Auto)    0 % (0-3) 


 


Basophils (%) (Auto)    0 % (0-3) 


 


Neutrophils # (Auto)


 


 


 


 4.7 x10^3/uL


(1.8-7.7)


 


Lymphocytes # (Auto)


 


 


 


 1.3 x10^3/uL


(1.0-4.8)


 


Monocytes # (Auto)


 


 


 


 0.3 x10^3/uL


(0.0-1.1)


 


Eosinophils # (Auto)


 


 


 


 0.0 x10^3/uL


(0.0-0.7)


 


Basophils # (Auto)


 


 


 


 0.0 x10^3/uL


(0.0-0.2)


 


Sodium Level


 


 


 


 150 mmol/L


(136-145)


 


Potassium Level


 


 


 


 3.9 mmol/L


(3.5-5.1)


 


Chloride Level


 


 


 


 111 mmol/L


()


 


Carbon Dioxide Level


 


 


 


 28 mmol/L


(21-32)


 


Anion Gap    11 (6-14) 


 


Blood Urea Nitrogen


 


 


 


 63 mg/dL


(7-20)


 


Creatinine


 


 


 


 0.9 mg/dL


(0.6-1.0)


 


Estimated GFR


(Cockcroft-Gault) 


 


 


 66.5 





 


Glucose Level


 


 


 


 162 mg/dL


(70-99)


 


Calcium Level


 


 


 


 8.8 mg/dL


(8.5-10.1)


 


Phosphorus Level


 


 


 


 4.7 mg/dL


(2.6-4.7)


 


Magnesium Level


 


 


 


 2.0 mg/dL


(1.8-2.4)


 


Iron Level


 


 


 


 23 ug/dL


()


 


Total Iron Binding Capacity


 


 


 


 76 ug/dL


(250-450)


 


Iron Saturation    30 % (15-34) 


 


Test


 4/28/20


12:08 4/28/20


18:44 4/28/20


23:40 4/29/20


06:10


 


Glucose (Fingerstick)


 148 mg/dL


(70-99) 116 mg/dL


(70-99) 94 mg/dL


(70-99) 





 


White Blood Count


 


 


 


 10.4 x10^3/uL


(4.0-11.0)


 


Red Blood Count


 


 


 


 2.51 x10^6/uL


(3.50-5.40)


 


Hemoglobin


 


 


 


 7.4 g/dL


(12.0-15.5)


 


Hematocrit


 


 


 


 22.9 %


(36.0-47.0)


 


Mean Corpuscular Volume    91 fL () 


 


Mean Corpuscular Hemoglobin    29 pg (25-35) 


 


Mean Corpuscular Hemoglobin


Concent 


 


 


 32 g/dL


(31-37)


 


Red Cell Distribution Width


 


 


 


 18.4 %


(11.5-14.5)


 


Platelet Count


 


 


 


 439 x10^3/uL


(140-400)


 


Sodium Level


 


 


 


 151 mmol/L


(136-145)


 


Potassium Level


 


 


 


 3.6 mmol/L


(3.5-5.1)


 


Chloride Level


 


 


 


 114 mmol/L


()


 


Carbon Dioxide Level


 


 


 


 29 mmol/L


(21-32)


 


Anion Gap    8 (6-14) 


 


Blood Urea Nitrogen


 


 


 


 58 mg/dL


(7-20)


 


Creatinine


 


 


 


 1.0 mg/dL


(0.6-1.0)


 


Estimated GFR


(Cockcroft-Gault) 


 


 


 58.9 





 


Glucose Level


 


 


 


 104 mg/dL


(70-99)


 


Calcium Level


 


 


 


 8.2 mg/dL


(8.5-10.1)


 


Test


 4/29/20


06:14 


 


 





 


Glucose (Fingerstick)


 87 mg/dL


(70-99) 


 


 











Laboratory Tests








Test


 4/28/20


12:08 4/28/20


18:44 4/28/20


23:40 4/29/20


06:10


 


Glucose (Fingerstick)


 148 mg/dL


(70-99) 116 mg/dL


(70-99) 94 mg/dL


(70-99) 





 


White Blood Count


 


 


 


 10.4 x10^3/uL


(4.0-11.0)


 


Red Blood Count


 


 


 


 2.51 x10^6/uL


(3.50-5.40)


 


Hemoglobin


 


 


 


 7.4 g/dL


(12.0-15.5)


 


Hematocrit


 


 


 


 22.9 %


(36.0-47.0)


 


Mean Corpuscular Volume    91 fL () 


 


Mean Corpuscular Hemoglobin    29 pg (25-35) 


 


Mean Corpuscular Hemoglobin


Concent 


 


 


 32 g/dL


(31-37)


 


Red Cell Distribution Width


 


 


 


 18.4 %


(11.5-14.5)


 


Platelet Count


 


 


 


 439 x10^3/uL


(140-400)


 


Sodium Level


 


 


 


 151 mmol/L


(136-145)


 


Potassium Level


 


 


 


 3.6 mmol/L


(3.5-5.1)


 


Chloride Level


 


 


 


 114 mmol/L


()


 


Carbon Dioxide Level


 


 


 


 29 mmol/L


(21-32)


 


Anion Gap    8 (6-14) 


 


Blood Urea Nitrogen


 


 


 


 58 mg/dL


(7-20)


 


Creatinine


 


 


 


 1.0 mg/dL


(0.6-1.0)


 


Estimated GFR


(Cockcroft-Gault) 


 


 


 58.9 





 


Glucose Level


 


 


 


 104 mg/dL


(70-99)


 


Calcium Level


 


 


 


 8.2 mg/dL


(8.5-10.1)


 


Test


 4/29/20


06:14 


 


 





 


Glucose (Fingerstick)


 87 mg/dL


(70-99) 


 


 











Microbiology


4/15/20 Aerobic and Anaerobic Culture - Final, Complete


          


4/15/20 Anaerobic Culture Result 1 (ANSON) - Final, Complete


          


4/15/20 Aerobic Culture - Final, Complete


          


4/15/20 Aerobic Culture Result 1 (ANSON) - Final, Complete


          


4/15/20 Gram Stain - Final, Complete


          


4/15/20 Gram Stain Result 1 (ANSON) - Final, Complete


          


4/15/20 Gram Stain Result 2 (ANSON) - Final, Complete


          


4/14/20 Blood Culture - Final, Complete


          NO GROWTH AFTER 5 DAYS


4/12/20 Urine Culture - Final, Complete


          


4/12/20 Urine Culture Result 1 (ANSON) - Final, Complete


Medications





Current Medications


Sodium Chloride 1,000 ml @  1,000 mls/hr Q1H IV  Last administered on 3/16/20at 

03:00;  Start 3/16/20 at 03:00;  Stop 3/16/20 at 03:59;  Status DC


Ondansetron HCl (Zofran) 4 mg 1X  ONCE IVP  Last administered on 3/16/20at 

03:27;  Start 3/16/20 at 03:00;  Stop 3/16/20 at 03:01;  Status DC


Morphine Sulfate (Morphine Sulfate) 4 mg 1X  ONCE IV ;  Start 3/16/20 at 03:00; 

Stop 3/16/20 at 03:01;  Status Cancel


Ketorolac Tromethamine (Toradol 30mg Vial) 30 mg 1X  ONCE IV  Last administered 

on 3/16/20at 02:54;  Start 3/16/20 at 03:00;  Stop 3/16/20 at 03:01;  Status DC


Fentanyl Citrate (Fentanyl 2ml Vial) 25 mcg 1X  ONCE IVP  Last administered on 

3/16/20at 03:23;  Start 3/16/20 at 03:30;  Stop 3/16/20 at 03:31;  Status DC


Fentanyl Citrate (Fentanyl 2ml Vial) 100 mcg STK-MED ONCE .ROUTE ;  Start 

3/16/20 at 03:18;  Stop 3/16/20 at 03:18;  Status DC


Iohexol (Omnipaque 350 Mg/ml) 90 ml 1X  ONCE IV  Last administered on 3/16/20at 

03:25;  Start 3/16/20 at 03:30;  Stop 3/16/20 at 03:31;  Status DC


Info (CONTRAST GIVEN -- Rx MONITORING) 1 each PRN DAILY  PRN MC SEE COMMENTS;  

Start 3/16/20 at 03:30;  Stop 3/18/20 at 03:29;  Status DC


Hydromorphone HCl (Dilaudid) 0.5 mg 1X  ONCE IV  Last administered on 3/16/20at 

03:55;  Start 3/16/20 at 04:30;  Stop 3/16/20 at 04:32;  Status DC


Ondansetron HCl (Zofran) 4 mg PRN Q8HRS  PRN IV NAUSEA/VOMITING 1ST CHOICE;  

Start 3/16/20 at 05:00;  Stop 3/16/20 at 09:27;  Status DC


Morphine Sulfate (Morphine Sulfate) 2 mg PRN Q2HR  PRN IV SEVERE PAIN 7-10 Last 

administered on 3/17/20at 12:26;  Start 3/16/20 at 05:00;  Stop 3/17/20 at 

14:15;  Status DC


Sodium Chloride 1,000 ml @  125 mls/hr Q8H IV  Last administered on 3/16/20at 

20:56;  Start 3/16/20 at 05:00;  Stop 3/17/20 at 04:59;  Status DC


Hydromorphone HCl (Dilaudid) 0.5 mg PRN Q3HRS  PRN IV SEVERE PAIN 7-10 Last 

administered on 3/17/20at 10:06;  Start 3/16/20 at 05:00;  Stop 3/17/20 at 

12:01;  Status DC


Piperacillin Sod/ Tazobactam Sod 4.5 gm/Sodium Chloride 100 ml @  200 mls/hr 1X 

ONCE IV  Last administered on 3/16/20at 05:44;  Start 3/16/20 at 06:00;  Stop 

3/16/20 at 06:29;  Status DC


Ondansetron HCl (Zofran) 4 mg PRN Q4HRS  PRN IV NAUSEA/VOMITING 1ST CHOICE Last 

administered on 4/24/20at 11:01;  Start 3/16/20 at 09:30


Insulin Human Lispro (HumaLOG) 0-9 UNITS Q6HRS SQ  Last administered on 

4/28/20at 08:42;  Start 3/16/20 at 09:30


Dextrose (Dextrose 50%-Water Syringe) 12.5 gm PRN Q15MIN  PRN IV SEE COMMENTS;  

Start 3/16/20 at 09:30


Pantoprazole Sodium (PROTONIX VIAL for IV PUSH) 40 mg DAILYAC IVP  Last 

administered on 4/29/20at 07:39;  Start 3/16/20 at 11:30


Prochlorperazine Edisylate (Compazine) 10 mg PRN Q6HRS  PRN IV NAUSEA/VOMITING, 

2nd CHOICE Last administered on 4/29/20at 07:40;  Start 3/16/20 at 17:45


Atenolol (Tenormin) 100 mg DAILY PO ;  Start 3/17/20 at 09:00;  Stop 3/16/20 at 

20:08;  Status DC


Metoprolol Tartrate (Lopressor Vial) 2.5 mg Q6HRS IVP  Last administered on 

3/17/20at 05:51;  Start 3/16/20 at 20:15;  Stop 3/17/20 at 10:02;  Status DC


Metoprolol Tartrate (Lopressor Vial) 5 mg Q6HRS IVP  Last administered on 3

/26/20at 00:12;  Start 3/17/20 at 10:15;  Stop 3/28/20 at 08:48;  Status DC


Hydromorphone HCl (Dilaudid) 1 mg PRN Q3HRS  PRN IV SEVERE PAIN 7-10 Last 

administered on 3/23/20at 05:13;  Start 3/17/20 at 12:00;  Stop 3/31/20 at 

00:25;  Status DC


Lidocaine HCl (Buffered Lidocaine 1%) 3 ml STK-MED ONCE .ROUTE ;  Start 3/17/20 

at 12:55;  Stop 3/17/20 at 12:56;  Status DC


Albumin Human 500 ml @  125 mls/hr 1X  ONCE IV  Last administered on 3/17/20at 

14:33;  Start 3/17/20 at 14:30;  Stop 3/17/20 at 18:32;  Status DC


Norepinephrine Bitartrate 8 mg/ Dextrose 258 ml @  17.299 mls/ hr CONT  PRN IV 

PER PROTOCOL Last administered on 4/14/20at 12:48;  Start 3/17/20 at 15:30;  

Stop 4/17/20 at 09:19;  Status DC


Sodium Chloride 1,000 ml @  125 mls/hr Q8H IV  Last administered on 3/17/20at 

21:04;  Start 3/17/20 at 16:00;  Stop 3/18/20 at 02:42;  Status DC


Albumin Human 500 ml @  125 mls/hr PRN BID  PRN IV After every 2L NSS & BP < 

90mm Last administered on 4/1/20at 14:21;  Start 3/17/20 at 16:00


Iohexol (Omnipaque 300 Mg/ml) 60 ml 1X  ONCE IV  Last administered on 3/17/20at 

17:20;  Start 3/17/20 at 17:00;  Stop 3/17/20 at 17:01;  Status DC


Info (CONTRAST GIVEN -- Rx MONITORING) 1 each PRN DAILY  PRN MC SEE COMMENTS;  

Start 3/17/20 at 17:00;  Stop 3/19/20 at 16:59;  Status DC


Meropenem 1 gm/ Sodium Chloride 100 ml @  200 mls/hr Q8HRS IV  Last administered

on 3/18/20at 05:45;  Start 3/17/20 at 20:00;  Stop 3/18/20 at 08:48;  Status DC


Furosemide (Lasix) 40 mg 1X  ONCE IVP  Last administered on 3/17/20at 22:12;  

Start 3/17/20 at 22:30;  Stop 3/17/20 at 22:31;  Status DC


Calcium Chloride 1000 mg/Sodium Chloride 110 ml @  220 mls/hr 1X  ONCE IV  Last 

administered on 3/17/20at 22:11;  Start 3/17/20 at 22:30;  Stop 3/17/20 at 

22:59;  Status DC


Albuterol Sulfate (Ventolin Neb Soln) 2.5 mg 1X  ONCE NEB  Last administered on 

3/18/20at 00:56;  Start 3/17/20 at 22:30;  Stop 3/17/20 at 22:31;  Status DC


Insulin Human Regular (HumuLIN R VIAL) 5 unit 1X  ONCE IV  Last administered on 

3/17/20at 22:14;  Start 3/17/20 at 22:30;  Stop 3/17/20 at 22:31;  Status DC


Magnesium Sulfate 50 ml @ 25 mls/hr 1X  ONCE IV  Last administered on 3/18/20at 

02:57;  Start 3/18/20 at 03:00;  Stop 3/18/20 at 04:59;  Status DC


Calcium Gluconate 1000 mg/Sodium Chloride 110 ml @  220 mls/hr 1X  ONCE IV  Last

administered on 3/18/20at 02:46;  Start 3/18/20 at 03:00;  Stop 3/18/20 at 

03:29;  Status DC


Sodium Chloride 1,000 ml @  200 mls/hr Q5H IV  Last administered on 3/18/20at 

02:46;  Start 3/18/20 at 03:00;  Stop 3/18/20 at 10:21;  Status DC


Calcium Gluconate 1000 mg/Sodium Chloride 110 ml @  220 mls/hr 1X  ONCE IV  Last

administered on 3/18/20at 03:21;  Start 3/18/20 at 03:30;  Stop 3/18/20 at 03:5

9;  Status DC


Sodium Bicarbonate 50 meq/Sodium Chloride 1,050 ml @  75 mls/hr Q14H IV  Last 

administered on 3/22/20at 21:10;  Start 3/18/20 at 07:30;  Stop 3/23/20 at 

10:28;  Status DC


Calcium Gluconate 2000 mg/Sodium Chloride 120 ml @  220 mls/hr 1X  ONCE IV  Last

administered on 3/18/20at 09:05;  Start 3/18/20 at 07:30;  Stop 3/18/20 at 

08:02;  Status DC


Lidocaine HCl (Xylocaine-Mpf 1% 2ml Vial) 2 ml STK-MED ONCE .ROUTE ;  Start 

3/18/20 at 08:47;  Stop 3/18/20 at 08:47;  Status DC


Meropenem 500 mg/ Sodium Chloride 50 ml @  100 mls/hr Q12HR IV  Last 

administered on 3/23/20at 21:01;  Start 3/18/20 at 18:00;  Stop 3/24/20 at 

07:58;  Status DC


Lidocaine HCl (Buffered Lidocaine 1%) 3 ml STK-MED ONCE .ROUTE ;  Start 3/18/20 

at 09:46;  Stop 3/18/20 at 09:46;  Status DC


Lidocaine HCl (Buffered Lidocaine 1%) 6 ml 1X  ONCE INJ  Last administered on 

3/18/20at 10:26;  Start 3/18/20 at 10:15;  Stop 3/18/20 at 10:16;  Status DC


Info (Tpn Per Pharmacy) 1 each PRN DAILY  PRN MC SEE COMMENTS Last administered 

on 4/28/20at 12:51;  Start 3/18/20 at 12:00


Sodium Chloride 1,000 ml @  1,000 mls/hr Q1H PRN IV hypotension;  Start 3/18/20 

at 12:07;  Stop 3/18/20 at 18:06;  Status DC


Diphenhydramine HCl (Benadryl) 25 mg 1X PRN  PRN IV ITCHING;  Start 3/18/20 at 

12:15;  Stop 3/19/20 at 12:14;  Status DC


Diphenhydramine HCl (Benadryl) 25 mg 1X PRN  PRN IV ITCHING;  Start 3/18/20 at 

12:15;  Stop 3/19/20 at 12:14;  Status DC


Sodium Chloride 1,000 ml @  400 mls/hr Q2H30M PRN IV PATENCY;  Start 3/18/20 at 

12:07;  Stop 3/19/20 at 00:06;  Status DC


Info (PHARMACY MONITORING -- do not chart) 1 each PRN DAILY  PRN MC SEE 

COMMENTS;  Start 3/18/20 at 12:15;  Stop 3/20/20 at 08:13;  Status DC


Sodium Chloride 90 meq/Calcium Gluconate 10 meq/ Multivitamins 10 ml/Chromium/ 

Copper/Manganese/ Seleni/Zn 1 ml/ Total Parenteral Nutrition/Amino 

Acids/Dextrose/ Fat Emulsion Intravenous 55.005 ml  @ 2.292 mls/hr TPN  CONT IV 

;  Start 3/18/20 at 22:00;  Stop 3/18/20 at 12:33;  Status DC


Info (Tpn Per Pharmacy) 1 each PRN DAILY  PRN MC SEE COMMENTS;  Start 3/18/20 at

12:30;  Status UNV


Sodium Chloride 90 meq/Calcium Gluconate 10 meq/ Multivitamins 10 ml/Chromium/ 

Copper/Manganese/ Seleni/Zn 0.5 ml/ Total Parenteral Nutrition/Amino 

Acids/Dextrose/ Fat Emulsion Intravenous 1,512 ml @  63 mls/hr TPN  CONT IV  

Last administered on 3/18/20at 22:06;  Start 3/18/20 at 22:00;  Stop 3/19/20 at 

21:59;  Status DC


Calcium Carbonate/ Glycine (Tums) 500 mg PRN AFTMEALHC  PRN PO INDIGESTION;  

Start 3/18/20 at 17:45


Calcium Gluconate (Calcium Gluconate) 2,000 mg 1X  ONCE IVP  Last administered 

on 3/19/20at 02:19;  Start 3/19/20 at 02:15;  Stop 3/19/20 at 02:16;  Status DC


Calcium Chloride 3000 mg/Sodium Chloride 1,030 ml @  50 mls/hr [id number] IV  Last 

administered on 3/21/20at 02:17;  Start 3/19/20 at 08:00;  Stop 3/21/20 at 

15:23;  Status DC


Lorazepam (Ativan Inj) 1 mg PRN Q4HRS  PRN IVP ANXIETY / AGITATION, 2nd choic 

Last administered on 4/17/20at 03:51;  Start 3/19/20 at 09:00;  Stop 4/17/20 at 

09:19;  Status DC


Sodium Chloride 1,000 ml @  1,000 mls/hr Q1H PRN IV hypotension;  Start 3/19/20 

at 08:56;  Stop 3/19/20 at 14:55;  Status DC


Albumin Human 200 ml @  200 mls/hr 1X PRN  PRN IV Hypotension;  Start 3/19/20 at

09:00;  Stop 3/19/20 at 14:59;  Status DC


Diphenhydramine HCl (Benadryl) 25 mg 1X PRN  PRN IV ITCHING;  Start 3/19/20 at 

09:00;  Stop 3/20/20 at 08:59;  Status DC


Diphenhydramine HCl (Benadryl) 25 mg 1X PRN  PRN IV ITCHING;  Start 3/19/20 at 

09:00;  Stop 3/20/20 at 08:59;  Status DC


Sodium Chloride 1,000 ml @  400 mls/hr Q2H30M PRN IV PATENCY;  Start 3/19/20 at 

08:56;  Stop 3/19/20 at 20:55;  Status DC


Info (PHARMACY MONITORING -- do not chart) 1 each PRN DAILY  PRN MC SEE 

COMMENTS;  Start 3/19/20 at 09:00;  Status UNV


Info (PHARMACY MONITORING -- do not chart) 1 each PRN DAILY  PRN MC SEE 

COMMENTS;  Start 3/19/20 at 09:00;  Stop 3/20/20 at 08:13;  Status DC


Digoxin (Lanoxin) 500 mcg 1X  ONCE IV  Last administered on 3/19/20at 10:04;  

Start 3/19/20 at 10:00;  Stop 3/19/20 at 10:01;  Status DC


Digoxin (Lanoxin) 125 mcg 1X  ONCE IV  Last administered on 3/19/20at 17:10;  

Start 3/19/20 at 18:00;  Stop 3/19/20 at 18:01;  Status DC


Magnesium Sulfate 100 ml @  25 mls/hr 1X  ONCE IV  Last administered on 3

/19/20at 12:48;  Start 3/19/20 at 13:00;  Stop 3/19/20 at 16:59;  Status DC


Sodium Chloride 90 meq/Magnesium Sulfate 10 meq/ Calcium Gluconate 20 meq/ 

Multivitamins 10 ml/Chromium/ Copper/Manganese/ Seleni/Zn 0.5 ml/ Total 

Parenteral Nutrition/Amino Acids/Dextrose/ Fat Emulsion Intravenous 1,512 ml @  

63 mls/hr TPN  CONT IV  Last administered on 3/19/20at 22:25;  Start 3/19/20 at 

22:00;  Stop 3/20/20 at 21:59;  Status DC


Sodium Chloride 1,000 ml @  1,000 mls/hr Q1H PRN IV hypotension;  Start 3/20/20 

at 08:05;  Stop 3/20/20 at 14:04;  Status DC


Albumin Human 200 ml @  200 mls/hr 1X  ONCE IV  Last administered on 3/20/20at 

08:57;  Start 3/20/20 at 08:15;  Stop 3/20/20 at 09:14;  Status DC


Diphenhydramine HCl (Benadryl) 25 mg 1X PRN  PRN IV ITCHING;  Start 3/20/20 at 

08:15;  Stop 3/21/20 at 08:14;  Status DC


Diphenhydramine HCl (Benadryl) 25 mg 1X PRN  PRN IV ITCHING;  Start 3/20/20 at 

08:15;  Stop 3/21/20 at 08:14;  Status DC


Sodium Chloride 1,000 ml @  400 mls/hr Q2H30M PRN IV PATENCY;  Start 3/20/20 at 

08:05;  Stop 3/20/20 at 20:04;  Status DC


Info (PHARMACY MONITORING -- do not chart) 1 each PRN DAILY  PRN MC SEE 

COMMENTS;  Start 3/20/20 at 08:15;  Stop 3/24/20 at 07:57;  Status DC


Sodium Chloride 90 meq/Potassium Chloride 15 meq/ Potassium Phosphate 10 mmol/ 

Magnesium Sulfate 10 meq/Calcium Gluconate 20 meq/ Multivitamins 10 ml/Chromium/

Copper/Manganese/ Seleni/Zn 0.5 ml/ Total Parenteral Nutrition/Amino 

Acids/Dextrose/ Fat Emulsion Intravenous 1,512 ml @  63 mls/hr TPN  CONT IV  

Last administered on 3/20/20at 21:01;  Start 3/20/20 at 22:00;  Stop 3/21/20 at 

21:59;  Status DC


Potassium Chloride/Water 100 ml @  100 mls/hr 1X  ONCE IV  Last administered on 

3/20/20at 14:09;  Start 3/20/20 at 14:00;  Stop 3/20/20 at 14:59;  Status DC


Benzocaine (Hurricaine One) 1 spray 1X  ONCE MM  Last administered on 3/20/20at 

16:38;  Start 3/20/20 at 14:30;  Stop 3/20/20 at 14:31;  Status DC


Lidocaine HCl (Glydo (Lidocaine) Jelly) 1 thomas 1X  ONCE MM  Last administered on 

3/20/20at 16:38;  Start 3/20/20 at 14:30;  Stop 3/20/20 at 14:31;  Status DC


Linezolid/Dextrose 300 ml @  300 mls/hr Q12HR IV  Last administered on 3/26/20at

21:04;  Start 3/20/20 at 20:00;  Stop 3/27/20 at 07:50;  Status DC


Acetaminophen (Tylenol) 650 mg PRN Q6HRS  PRN PO MILD PAIN / TEMP;  Start 

3/21/20 at 03:30;  Stop 3/21/20 at 03:36;  Status DC


Acetaminophen (Tylenol) 650 mg PRN Q6HRS  PRN PEG MILD PAIN / TEMP Last 

administered on 4/16/20at 19:56;  Start 3/21/20 at 03:36


Sodium Chloride 1,000 ml @  1,000 mls/hr Q1H PRN IV hypotension;  Start 3/21/20 

at 07:50;  Stop 3/21/20 at 13:49;  Status DC


Albumin Human 200 ml @  200 mls/hr 1X PRN  PRN IV Hypotension;  Start 3/21/20 at

08:00;  Stop 3/21/20 at 13:59;  Status DC


Sodium Chloride (Normal Saline Flush) 10 ml 1X PRN  PRN IV AP catheter pack;  

Start 3/21/20 at 08:00;  Stop 3/22/20 at 07:59;  Status DC


Sodium Chloride (Normal Saline Flush) 10 ml 1X PRN  PRN IV  catheter pack;  

Start 3/21/20 at 08:00;  Stop 3/22/20 at 07:59;  Status DC


Sodium Chloride 1,000 ml @  400 mls/hr Q2H30M PRN IV PATENCY;  Start 3/21/20 at 

07:50;  Stop 3/21/20 at 19:49;  Status DC


Info (PHARMACY MONITORING -- do not chart) 1 each PRN DAILY  PRN MC SEE 

COMMENTS;  Start 3/21/20 at 08:00;  Status UNV


Info (PHARMACY MONITORING -- do not chart) 1 each PRN DAILY  PRN MC SEE 

COMMENTS;  Start 3/21/20 at 08:00;  Stop 3/23/20 at 08:25;  Status DC


Sodium Chloride 90 meq/Potassium Chloride 15 meq/ Potassium Phosphate 10 mmol/ M

agnesium Sulfate 10 meq/Calcium Gluconate 20 meq/ Multivitamins 10 ml/Chromium/ 

Copper/Manganese/ Seleni/Zn 0.5 ml/ Total Parenteral Nutrition/Amino 

Acids/Dextrose/ Fat Emulsion Intravenous 1,512 ml @  63 mls/hr TPN  CONT IV  

Last administered on 3/21/20at 20:57;  Start 3/21/20 at 22:00;  Stop 3/22/20 at 

21:59;  Status DC


Sodium Chloride 90 meq/Potassium Chloride 15 meq/ Potassium Phosphate 15 mmol/ 

Magnesium Sulfate 10 meq/Calcium Gluconate 20 meq/ Multivitamins 10 ml/Chromium/

Copper/Manganese/ Seleni/Zn 0.5 ml/ Total Parenteral Nutrition/Amino 

Acids/Dextrose/ Fat Emulsion Intravenous 1,512 ml @  63 mls/hr TPN  CONT IV ;  

Start 3/22/20 at 22:00;  Stop 3/22/20 at 14:16;  Status DC


Sodium Chloride 90 meq/Potassium Chloride 15 meq/ Potassium Phosphate 15 mmol/ 

Magnesium Sulfate 10 meq/Calcium Gluconate 20 meq/ Multivitamins 10 ml/Chromium/

Copper/Manganese/ Seleni/Zn 0.5 ml/ Total Parenteral Nutrition/Amino 

Acids/Dextrose/ Fat Emulsion Intravenous 1,200 ml @  50 mls/hr TPN  CONT IV ;  

Start 3/22/20 at 22:00;  Stop 3/22/20 at 14:17;  Status DC


Sodium Chloride 90 meq/Potassium Chloride 15 meq/ Potassium Phosphate 10 mmol/ 

Magnesium Sulfate 10 meq/Calcium Gluconate 20 meq/ Multivitamins 10 ml/Chromium/

Copper/Manganese/ Seleni/Zn 0.5 ml/ Total Parenteral Nutrition/Amino 

Acids/Dextrose/ Fat Emulsion Intravenous 1,200 ml @  50 mls/hr TPN  CONT IV  

Last administered on 3/22/20at 23:29;  Start 3/22/20 at 22:00;  Stop 3/23/20 at 

21:59;  Status DC


Sodium Chloride 1,000 ml @  1,000 mls/hr Q1H PRN IV hypotension;  Start 3/23/20 

at 07:28;  Stop 3/23/20 at 13:27;  Status DC


Albumin Human 200 ml @  200 mls/hr 1X  ONCE IV  Last administered on 3/23/20at 

08:51;  Start 3/23/20 at 07:30;  Stop 3/23/20 at 08:29;  Status DC


Diphenhydramine HCl (Benadryl) 25 mg 1X PRN  PRN IV ITCHING;  Start 3/23/20 at 

07:30;  Stop 3/24/20 at 07:29;  Status DC


Diphenhydramine HCl (Benadryl) 25 mg 1X PRN  PRN IV ITCHING;  Start 3/23/20 at 

07:30;  Stop 3/24/20 at 07:29;  Status DC


Sodium Chloride 1,000 ml @  400 mls/hr Q2H30M PRN IV PATENCY;  Start 3/23/20 at 

07:28;  Stop 3/23/20 at 19:27;  Status DC


Info (PHARMACY MONITORING -- do not chart) 1 each PRN DAILY  PRN MC SEE COMMENT

S;  Start 3/23/20 at 07:30;  Stop 4/3/20 at 13:01;  Status DC


Metronidazole 100 ml @  100 mls/hr Q6HRS IV  Last administered on 4/8/20at 

06:26;  Start 3/23/20 at 08:30;  Stop 4/8/20 at 09:58;  Status DC


Micafungin Sodium 100 mg/Dextrose 100 ml @  100 mls/hr Q24H IV  Last 

administered on 4/28/20at 08:39;  Start 3/23/20 at 09:00


Propofol 0 ml @ As Directed STK-MED ONCE IV ;  Start 3/23/20 at 07:53;  Stop 

3/23/20 at 07:53;  Status DC


Etomidate (Amidate) 20 mg STK-MED ONCE IV ;  Start 3/23/20 at 07:53;  Stop 3/

23/20 at 07:54;  Status DC


Midazolam HCl (Versed) 5 mg STK-MED ONCE .ROUTE ;  Start 3/23/20 at 07:57;  Stop

3/23/20 at 07:57;  Status DC


Fentanyl Citrate 30 ml @ 0 mls/hr CONT  PRN IV SEE PROTOCOL Last administered on

4/17/20at 06:12;  Start 3/23/20 at 08:15;  Stop 4/17/20 at 09:19;  Status DC


Artificial Tears (Artificial Tears) 1 drop PRN Q1HR  PRN OU DRY EYE, 1st choice;

 Start 3/23/20 at 08:15;  Stop 4/29/20 at 05:31;  Status DC


Midazolam HCl 50 mg/Sodium Chloride 50 ml @ 0 mls/hr CONT  PRN IV SEE PROTOCOL 

Last administered on 3/26/20at 22:39;  Start 3/23/20 at 08:15;  Stop 3/28/20 at 

15:59;  Status DC


Etomidate (Amidate) 8 mg 1X  ONCE IV  Last administered on 3/23/20at 08:33;  

Start 3/23/20 at 08:30;  Stop 3/23/20 at 08:31;  Status DC


Succinylcholine Chloride (Anectine) 120 mg 1X  ONCE IV  Last administered on 

3/23/20at 08:34;  Start 3/23/20 at 08:30;  Stop 3/23/20 at 08:31;  Status DC


Midazolam HCl (Versed) 5 mg 1X  ONCE IV ;  Start 3/23/20 at 08:30;  Stop 3/23/20

at 08:31;  Status DC


Potassium Chloride 15 meq/ Bicarbonate Dialysis Soln w/ out KCl 5,007.5 ml  @ 

1,000 mls/ hr Q5H1M IV  Last administered on 3/24/20at 11:11;  Start 3/23/20 at 

12:00;  Stop 3/24/20 at 11:15;  Status DC


Potassium Chloride 15 meq/ Bicarbonate Dialysis Soln w/ out KCl 5,007.5 ml  @ 

1,000 mls/ hr Q5H1M IV  Last administered on 3/24/20at 11:12;  Start 3/23/20 at 

12:00;  Stop 3/24/20 at 11:17;  Status DC


Potassium Chloride 15 meq/ Bicarbonate Dialysis Soln w/ out KCl 5,007.5 ml  @ 

1,000 mls/ hr Q5H1M IV  Last administered on 3/24/20at 11:11;  Start 3/23/20 at 

12:00;  Stop 3/24/20 at 11:19;  Status DC


Sodium Chloride 90 meq/Potassium Chloride 15 meq/ Potassium Phosphate 10 mmol/ 

Magnesium Sulfate 10 meq/Calcium Gluconate 20 meq/ Multivitamins 10 ml/Chromium/

Copper/Manganese/ Seleni/Zn 0.5 ml/ Total Parenteral Nutrition/Amino 

Acids/Dextrose/ Fat Emulsion Intravenous 1,400 ml @  58.333 mls/ hr TPN  CONT IV

 Last administered on 3/23/20at 21:42;  Start 3/23/20 at 22:00;  Stop 3/24/20 at

21:59;  Status DC


Heparin Sodium (Porcine) (Heparin Sodium) 5,000 unit Q8HRS SQ  Last administered

on 3/28/20at 05:55;  Start 3/23/20 at 15:00;  Stop 3/28/20 at 13:28;  Status DC


Meropenem 500 mg/ Sodium Chloride 50 ml @  100 mls/hr Q6HRS IV  Last 

administered on 3/25/20at 06:00;  Start 3/24/20 at 09:00;  Stop 3/25/20 at 

07:29;  Status DC


Potassium Phosphate 20 mmol/ Sodium Chloride 106.6667 ml @  51.667 m... 1X  ONCE

IV  Last administered on 3/24/20at 11:22;  Start 3/24/20 at 10:15;  Stop 3/24/20

at 12:18;  Status DC


Acetaminophen (Tylenol Supp) 650 mg PRN Q6HRS  PRN NC MILD PAIN / TEMP > 100.3'F

Last administered on 4/27/20at 00:32;  Start 3/24/20 at 10:30


Potassium Chloride/Water 100 ml @  100 mls/hr Q1H IV  Last administered on 

3/24/20at 12:12;  Start 3/24/20 at 11:00;  Stop 3/24/20 at 12:59;  Status DC


Potassium Chloride 20 meq/ Bicarbonate Dialysis Soln w/ out KCl 5,010 ml @  

1,000 mls/hr Q5H1M IV  Last administered on 3/25/20at 08:48;  Start 3/24/20 at 

12:00;  Stop 3/25/20 at 13:03;  Status DC


Potassium Chloride 20 meq/ Bicarbonate Dialysis Soln w/ out KCl 5,010 ml @  

1,000 mls/hr Q5H1M IV  Last administered on 3/29/20at 14:52;  Start 3/24/20 at 

11:30;  Stop 3/29/20 at 19:59;  Status DC


Potassium Chloride 20 meq/ Bicarbonate Dialysis Soln w/ out KCl 5,010 ml @  

1,000 mls/hr Q5H1M IV  Last administered on 3/29/20at 14:53;  Start 3/24/20 at 

11:30;  Stop 3/29/20 at 19:59;  Status DC


Sodium Chloride 90 meq/Potassium Chloride 15 meq/ Potassium Phosphate 15 mmol/ 

Magnesium Sulfate 10 meq/Calcium Gluconate 15 meq/ Multivitamins 10 ml/Chromium/

Copper/Manganese/ Seleni/Zn 0.5 ml/ Total Parenteral Nutrition/Amino 

Acids/Dextrose/ Fat Emulsion Intravenous 1,400 ml @  58.333 mls/ hr TPN  CONT IV

 Last administered on 3/24/20at 22:17;  Start 3/24/20 at 22:00;  Stop 3/25/20 at

21:59;  Status DC


Cefepime HCl (Maxipime) 2 gm Q12HR IVP  Last administered on 4/7/20at 20:56;  

Start 3/25/20 at 09:00;  Stop 4/8/20 at 09:58;  Status DC


Daptomycin 500 mg/ Sodium Chloride 50 ml @  100 mls/hr Q48H IV  Last 

administered on 4/10/20at 09:57;  Start 3/25/20 at 08:30;  Stop 4/10/20 at 1

0:07;  Status DC


Lidocaine HCl (Buffered Lidocaine 1%) 3 ml 1X  ONCE INJ  Last administered on 

3/25/20at 10:27;  Start 3/25/20 at 10:30;  Stop 3/25/20 at 10:31;  Status DC


Potassium Phosphate 20 mmol/ Sodium Chloride 106.6667 ml @  51.667 m... 1X  ONCE

IV  Last administered on 3/25/20at 12:51;  Start 3/25/20 at 13:00;  Stop 3/25/20

at 15:03;  Status DC


Sodium Chloride 90 meq/Potassium Chloride 15 meq/ Potassium Phosphate 18 mmol/ 

Magnesium Sulfate 8 meq/Calcium Gluconate 15 meq/ Multivitamins 10 ml/Chromium/ 

Copper/Manganese/ Seleni/Zn 0.5 ml/ Total Parenteral Nutrition/Amino 

Acids/Dextrose/ Fat Emulsion Intravenous 1,400 ml @  58.333 mls/ hr TPN  CONT IV

 Last administered on 3/25/20at 22:16;  Start 3/25/20 at 22:00;  Stop 3/26/20 at

21:59;  Status DC


Potassium Chloride 20 meq/ Bicarbonate Dialysis Soln w/ out KCl 5,010 ml @  

1,000 mls/hr Q5H1M IV  Last administered on 3/29/20at 14:54;  Start 3/25/20 at 

16:00;  Stop 3/29/20 at 19:59;  Status DC


Multi-Ingred Cream/Lotion/Oil/ Oint (Artificial Tears Eye Ointment) 1 thomas PRN 

Q1HR  PRN OU DRY EYE, 2nd choice Last administered on 4/13/20at 08:19;  Start 

3/25/20 at 17:30


Sodium Chloride 90 meq/Potassium Chloride 15 meq/ Potassium Phosphate 18 mmol/ 

Magnesium Sulfate 8 meq/Calcium Gluconate 15 meq/ Multivitamins 10 ml/Chromium/ 

Copper/Manganese/ Seleni/Zn 0.5 ml/ Total Parenteral Nutrition/Amino 

Acids/Dextrose/ Fat Emulsion Intravenous 1,400 ml @  58.333 mls/ hr TPN  CONT IV

 Last administered on 3/26/20at 22:00;  Start 3/26/20 at 22:00;  Stop 3/27/20 at

21:59;  Status DC


Albumin Human 500 ml @  125 mls/hr 1X  ONCE IV ;  Start 3/26/20 at 14:15;  Stop 

3/26/20 at 18:14;  Status DC


Sodium Chloride 90 meq/Potassium Chloride 15 meq/ Potassium Phosphate 18 mmol/ 

Magnesium Sulfate 8 meq/Calcium Gluconate 15 meq/ Multivitamins 10 ml/Chromium/ 

Copper/Manganese/ Seleni/Zn 0.5 ml/ Insulin Human Regular 10 unit/ Total 

Parenteral Nutrition/Amino Acids/Dextrose/ Fat Emulsion Intravenous 1,400 ml @  

58.333 mls/ hr TPN  CONT IV  Last administered on 3/27/20at 21:43;  Start 

3/27/20 at 22:00;  Stop 3/28/20 at 21:59;  Status DC


Lidocaine HCl (Buffered Lidocaine 1%) 3 ml STK-MED ONCE .ROUTE ;  Start 3/25/20 

at 10:00;  Stop 3/27/20 at 13:57;  Status DC


Midazolam HCl 100 mg/Sodium Chloride 100 ml @ 7 mls/hr CONT  PRN IV SEE PROTOCOL

Last administered on 4/8/20at 15:35;  Start 3/28/20 at 16:00


Sodium Chloride 90 meq/Potassium Chloride 15 meq/ Potassium Phosphate 18 mmol/ 

Magnesium Sulfate 8 meq/Calcium Gluconate 15 meq/ Multivitamins 10 ml/Chromium/ 

Copper/Manganese/ Seleni/Zn 0.5 ml/ Insulin Human Regular 15 unit/ Total 

Parenteral Nutrition/Amino Acids/Dextrose/ Fat Emulsion Intravenous 1,400 ml @  

58.333 mls/ hr TPN  CONT IV  Last administered on 3/28/20at 20:34;  Start 

3/28/20 at 22:00;  Stop 3/29/20 at 21:59;  Status DC


Info ([facility] Electrolyte Protocol) 1 ea CONT PRN  PRN MC PER PROTOCOL;  Start 

3/29/20 at 13:15


Sodium Chloride 90 meq/Potassium Chloride 15 meq/ Potassium Phosphate 18 mmol/ 

Magnesium Sulfate 8 meq/Calcium Gluconate 15 meq/ Multivitamins 10 ml/Chromium/ 

Copper/Manganese/ Seleni/Zn 0.5 ml/ Insulin Human Regular 15 unit/ Total 

Parenteral Nutrition/Amino Acids/Dextrose/ Fat Emulsion Intravenous 1,400 ml @  

58.333 mls/ hr TPN  CONT IV  Last administered on 3/29/20at 22:05;  Start 

3/29/20 at 22:00;  Stop 3/30/20 at 21:59;  Status DC


Potassium Chloride 15 meq/ Bicarbonate Dialysis Soln w/ out KCl 5,007.5 ml  @ 

1,000 mls/ hr Q5H1M IV  Last administered on 4/1/20at 18:14;  Start 3/29/20 at 

20:00;  Stop 4/2/20 at 13:08;  Status DC


Potassium Chloride 15 meq/ Bicarbonate Dialysis Soln w/ out KCl 5,007.5 ml  @ 

1,000 mls/ hr Q5H1M IV  Last administered on 4/1/20at 18:14;  Start 3/29/20 at 

20:00;  Stop 4/2/20 at 13:08;  Status DC


Potassium Chloride 15 meq/ Bicarbonate Dialysis Soln w/ out KCl 5,007.5 ml  @ 

1,000 mls/ hr Q5H1M IV  Last administered on 4/1/20at 18:14;  Start 3/29/20 at 

20:00;  Stop 4/2/20 at 13:08;  Status DC


Iohexol (Omnipaque 240 Mg/ml) 30 ml 1X  ONCE PO  Last administered on 3/30/20at 

11:30;  Start 3/30/20 at 11:30;  Stop 3/30/20 at 11:33;  Status DC


Info (CONTRAST GIVEN -- Rx MONITORING) 1 each PRN DAILY  PRN MC SEE COMMENTS;  

Start 3/30/20 at 11:45;  Stop 4/1/20 at 11:44;  Status DC


Sodium Chloride 90 meq/Potassium Chloride 15 meq/ Potassium Phosphate 18 mmol/ 

Magnesium Sulfate 8 meq/Calcium Gluconate 15 meq/ Multivitamins 10 ml/Chromium/ 

Copper/Manganese/ Seleni/Zn 0.5 ml/ Insulin Human Regular 15 unit/ Total 

Parenteral Nutrition/Amino Acids/Dextrose/ Fat Emulsion Intravenous 1,400 ml @  

58.333 mls/ hr TPN  CONT IV  Last administered on 3/30/20at 21:47;  Start 

3/30/20 at 22:00;  Stop 3/31/20 at 21:59;  Status DC


Sodium Chloride 90 meq/Potassium Chloride 15 meq/ Potassium Phosphate 18 mmol/ 

Magnesium Sulfate 8 meq/Calcium Gluconate 15 meq/ Multivitamins 10 ml/Chromium/ 

Copper/Manganese/ Seleni/Zn 0.5 ml/ Insulin Human Regular 20 unit/ Total 

Parenteral Nutrition/Amino Acids/Dextrose/ Fat Emulsion Intravenous 1,400 ml @  

58.333 mls/ hr TPN  CONT IV  Last administered on 3/31/20at 21:36;  Start 

3/31/20 at 22:00;  Stop 4/1/20 at 21:59;  Status DC


Alteplase, Recombinant (Cathflo For Central Catheter Clearance) 1 mg 1X  ONCE 

INT CAT  Last administered on 3/31/20at 20:03;  Start 3/31/20 at 19:30;  Stop 

3/31/20 at 19:46;  Status DC


Alteplase, Recombinant (Cathflo For Central Catheter Clearance) 1 mg 1X  ONCE 

INT CAT  Last administered on 3/31/20at 22:05;  Start 3/31/20 at 22:00;  Stop 

3/31/20 at 22:01;  Status DC


Sodium Chloride 90 meq/Potassium Chloride 15 meq/ Potassium Phosphate 18 mmol/ 

Magnesium Sulfate 8 meq/Calcium Gluconate 15 meq/ Multivitamins 10 ml/Chromium/ 

Copper/Manganese/ Seleni/Zn 0.5 ml/ Insulin Human Regular 20 unit/ Total 

Parenteral Nutrition/Amino Acids/Dextrose/ Fat Emulsion Intravenous 1,400 ml @  

58.333 mls/ hr TPN  CONT IV  Last administered on 4/1/20at 21:30;  Start 4/1/20 

at 22:00;  Stop 4/2/20 at 21:59;  Status DC


Dexmedetomidine HCl 400 mcg/ Sodium Chloride 100 ml @ 0 mls/hr CONT  PRN IV 

ANXIETY / AGITATION Last administered on 4/29/20at 05:56;  Start 4/2/20 at 08:15


Sodium Chloride 500 ml @  500 mls/hr 1X PRN  PRN IV ELEVATED BP, SEE COMMENTS;  

Start 4/2/20 at 08:15


Atropine Sulfate (ATROPINE 0.5mg SYRINGE) 0.5 mg PRN Q5MIN  PRN IV SEE COMMENTS;

 Start 4/2/20 at 08:15


Furosemide (Lasix) 20 mg 1X  ONCE IVP  Last administered on 4/2/20at 08:19;  

Start 4/2/20 at 08:15;  Stop 4/2/20 at 08:16;  Status DC


Lidocaine HCl (Buffered Lidocaine 1%) 3 ml STK-MED ONCE .ROUTE ;  Start 4/2/20 

at 08:39;  Stop 4/2/20 at 08:39;  Status DC


Lidocaine HCl (Buffered Lidocaine 1%) 6 ml 1X  ONCE INJ  Last administered on 

4/2/20at 09:05;  Start 4/2/20 at 09:00;  Stop 4/2/20 at 09:06;  Status DC


Sodium Chloride 90 meq/Potassium Chloride 15 meq/ Potassium Phosphate 18 mmol/ 

Magnesium Sulfate 8 meq/Calcium Gluconate 15 meq/ Multivitamins 10 ml/Chromium/ 

Copper/Manganese/ Seleni/Zn 0.5 ml/ Insulin Human Regular 20 unit/ Total 

Parenteral Nutrition/Amino Acids/Dextrose/ Fat Emulsion Intravenous 1,400 ml @  

58.333 mls/ hr TPN  CONT IV  Last administered on 4/2/20at 22:45;  Start 4/2/20 

at 22:00;  Stop 4/3/20 at 21:59;  Status DC


Sodium Chloride 1,000 ml @  1,000 mls/hr Q1H PRN IV hypotension;  Start 4/3/20 

at 07:30;  Stop 4/3/20 at 13:29;  Status DC


Albumin Human 200 ml @  200 mls/hr 1X PRN  PRN IV Hypotension Last administered 

on 4/3/20at 09:36;  Start 4/3/20 at 07:30;  Stop 4/3/20 at 13:29;  Status DC


Sodium Chloride (Normal Saline Flush) 10 ml 1X PRN  PRN IV AP catheter pack;  

Start 4/3/20 at 07:30;  Stop 4/3/20 at 21:29;  Status DC


Sodium Chloride (Normal Saline Flush) 10 ml 1X PRN  PRN IV  catheter pack;  

Start 4/3/20 at 07:30;  Stop 4/4/20 at 07:29;  Status DC


Sodium Chloride 1,000 ml @  400 mls/hr Q2H30M PRN IV PATENCY;  Start 4/3/20 at 

07:30;  Stop 4/3/20 at 19:29;  Status DC


Info (PHARMACY MONITORING -- do not chart) 1 each PRN DAILY  PRN MC SEE 

COMMENTS;  Start 4/3/20 at 07:30;  Stop 4/3/20 at 13:02;  Status DC


Info (PHARMACY MONITORING -- do not chart) 1 each PRN DAILY  PRN MC SEE 

COMMENTS;  Start 4/3/20 at 07:30;  Stop 4/5/20 at 12:45;  Status DC


Sodium Chloride 90 meq/Potassium Chloride 15 meq/ Potassium Phosphate 10 mmol/ 

Magnesium Sulfate 8 meq/Calcium Gluconate 15 meq/ Multivitamins 10 ml/Chromium/ 

Copper/Manganese/ Seleni/Zn 0.5 ml/ Insulin Human Regular 25 unit/ Total 

Parenteral Nutrition/Amino Acids/Dextrose/ Fat Emulsion Intravenous 1,400 ml @  

58.333 mls/ hr TPN  CONT IV  Last administered on 4/3/20at 22:19;  Start 4/3/20 

at 22:00;  Stop 4/4/20 at 21:59;  Status DC


Heparin Sodium (Porcine) (Heparin Sodium) 5,000 unit Q12HR SQ  Last administered

on 4/26/20at 08:59;  Start 4/3/20 at 21:00;  Stop 4/26/20 at 10:05;  Status DC


Ondansetron HCl (Zofran) 4 mg PRN Q6HRS  PRN IV NAUSEA/VOMITING;  Start 4/6/20 

at 07:00;  Stop 4/7/20 at 06:59;  Status DC


Fentanyl Citrate (Fentanyl 2ml Vial) 25 mcg PRN Q5MIN  PRN IV MILD PAIN 1-3;  

Start 4/6/20 at 07:00;  Stop 4/7/20 at 06:59;  Status DC


Fentanyl Citrate (Fentanyl 2ml Vial) 50 mcg PRN Q5MIN  PRN IV MODERATE TO SEVERE

PAIN;  Start 4/6/20 at 07:00;  Stop 4/7/20 at 06:59;  Status DC


Ringer's Solution 1,000 ml @  30 mls/hr Q24H IV ;  Start 4/6/20 at 07:00;  Stop 

4/6/20 at 18:59;  Status DC


Lidocaine HCl (Xylocaine-Mpf 1% 2ml Vial) 2 ml PRN 1X  PRN ID PRIOR TO IV START;

 Start 4/6/20 at 07:00;  Stop 4/7/20 at 06:59;  Status DC


Prochlorperazine Edisylate (Compazine) 5 mg PACU PRN  PRN IV NAUSEA, MRX1;  

Start 4/6/20 at 07:00;  Stop 4/7/20 at 06:59;  Status DC


Sodium Chloride 1,000 ml @  1,000 mls/hr Q1H PRN IV hypotension;  Start 4/4/20 

at 09:10;  Stop 4/4/20 at 15:09;  Status DC


Albumin Human 200 ml @  200 mls/hr 1X PRN  PRN IV Hypotension Last administered 

on 4/4/20at 10:10;  Start 4/4/20 at 09:15;  Stop 4/4/20 at 15:14;  Status DC


Sodium Chloride 1,000 ml @  400 mls/hr Q2H30M PRN IV PATENCY;  Start 4/4/20 at 

09:10;  Stop 4/4/20 at 21:09;  Status DC


Info (PHARMACY MONITORING -- do not chart) 1 each PRN DAILY  PRN MC SEE 

COMMENTS;  Start 4/4/20 at 09:15;  Stop 4/5/20 at 12:45;  Status DC


Info (PHARMACY MONITORING -- do not chart) 1 each PRN DAILY  PRN MC SEE 

COMMENTS;  Start 4/4/20 at 09:15;  Stop 4/5/20 at 12:45;  Status DC


Sodium Chloride 90 meq/Potassium Chloride 15 meq/ Potassium Phosphate 10 mmol/ 

Magnesium Sulfate 8 meq/Calcium Gluconate 15 meq/ Multivitamins 10 ml/Chromium/ 

Copper/Manganese/ Seleni/Zn 0.5 ml/ Insulin Human Regular 25 unit/ Total 

Parenteral Nutrition/Amino Acids/Dextrose/ Fat Emulsion Intravenous 1,400 ml @  

58.333 mls/ hr TPN  CONT IV  Last administered on 4/4/20at 22:10;  Start 4/4/20 

at 22:00;  Stop 4/5/20 at 21:59;  Status DC


Magnesium Sulfate 50 ml @ 25 mls/hr PRN DAILY  PRN IV for Mag < 1.7 on am labs 

Last administered on 4/20/20at 17:27;  Start 4/5/20 at 09:15


Sodium Chloride 90 meq/Potassium Chloride 15 meq/ Potassium Phosphate 10 mmol/ 

Magnesium Sulfate 8 meq/Calcium Gluconate 15 meq/ Multivitamins 10 ml/Chromium/ 

Copper/Manganese/ Seleni/Zn 0.5 ml/ Insulin Human Regular 25 unit/ Total 

Parenteral Nutrition/Amino Acids/Dextrose/ Fat Emulsion Intravenous 1,400 ml @  

58.333 mls/ hr TPN  CONT IV  Last administered on 4/5/20at 21:20;  Start 4/5/20 

at 22:00;  Stop 4/6/20 at 21:59;  Status DC


Sodium Chloride 1,000 ml @  1,000 mls/hr Q1H PRN IV hypotension;  Start 4/5/20 

at 12:23;  Stop 4/5/20 at 18:22;  Status DC


Albumin Human 200 ml @  200 mls/hr 1X  ONCE IV  Last administered on 4/5/20at 

13:34;  Start 4/5/20 at 12:30;  Stop 4/5/20 at 13:29;  Status DC


Diphenhydramine HCl (Benadryl) 25 mg 1X PRN  PRN IV ITCHING;  Start 4/5/20 at 

12:30;  Stop 4/6/20 at 12:29;  Status DC


Diphenhydramine HCl (Benadryl) 25 mg 1X PRN  PRN IV ITCHING;  Start 4/5/20 at 

12:30;  Stop 4/6/20 at 12:29;  Status DC


Info (PHARMACY MONITORING -- do not chart) 1 each PRN DAILY  PRN MC SEE 

COMMENTS;  Start 4/5/20 at 12:30;  Status Cancel


Bupivacaine HCl/ Epinephrine Bitart (Sensorcain-Epi 0.5%-1:[id number] Mpf) 30 ml 

STK-MED ONCE .ROUTE  Last administered on 4/6/20at 11:44;  Start 4/6/20 at 

11:00;  Stop 4/6/20 at 11:01;  Status DC


Cellulose (Surgicel Fibrillar 1x2) 1 each STK-MED ONCE .ROUTE ;  Start 4/6/20 at

11:00;  Stop 4/6/20 at 11:01;  Status DC


Sodium Chloride 90 meq/Potassium Chloride 15 meq/ Potassium Phosphate 10 mmol/ 

Magnesium Sulfate 12 meq/Calcium Gluconate 15 meq/ Multivitamins 10 ml/Chromium/

Copper/Manganese/ Seleni/Zn 0.5 ml/ Insulin Human Regular 25 unit/ Total 

Parenteral Nutrition/Amino Acids/Dextrose/ Fat Emulsion Intravenous 1,400 ml @  

58.333 mls/ hr TPN  CONT IV  Last administered on 4/6/20at 22:24;  Start 4/6/20 

at 22:00;  Stop 4/7/20 at 21:59;  Status DC


Propofol 20 ml @ As Directed STK-MED ONCE IV ;  Start 4/6/20 at 11:07;  Stop 

4/6/20 at 11:07;  Status DC


Cellulose (Surgicel Hemostat 4x8) 1 each STK-MED ONCE .ROUTE  Last administered 

on 4/6/20at 11:44;  Start 4/6/20 at 11:55;  Stop 4/6/20 at 11:56;  Status DC


Sevoflurane (Ultane) 60 ml STK-MED ONCE IH ;  Start 4/6/20 at 12:46;  Stop 

4/6/20 at 12:46;  Status DC


Sodium Chloride 1,000 ml @  1,000 mls/hr Q1H PRN IV hypotension;  Start 4/6/20 

at 13:51;  Stop 4/6/20 at 19:50;  Status DC


Albumin Human 200 ml @  200 mls/hr 1X PRN  PRN IV Hypotension Last administered 

on 4/6/20at 14:51;  Start 4/6/20 at 14:00;  Stop 4/6/20 at 19:59;  Status DC


Diphenhydramine HCl (Benadryl) 25 mg 1X PRN  PRN IV ITCHING;  Start 4/6/20 at 

14:00;  Stop 4/7/20 at 13:59;  Status DC


Diphenhydramine HCl (Benadryl) 25 mg 1X PRN  PRN IV ITCHING;  Start 4/6/20 at 

14:00;  Stop 4/7/20 at 13:59;  Status DC


Sodium Chloride 1,000 ml @  400 mls/hr Q2H30M PRN IV PATENCY;  Start 4/6/20 at 

13:51;  Stop 4/7/20 at 01:50;  Status DC


Info (PHARMACY MONITORING -- do not chart) 1 each PRN DAILY  PRN MC SEE 

COMMENTS;  Start 4/6/20 at 14:00;  Stop 4/9/20 at 08:16;  Status DC


Heparin Sodium (Porcine) (Hep Lock Adult) 500 unit STK-MED ONCE IVP ;  Start 

4/7/20 at 09:29;  Stop 4/7/20 at 09:30;  Status DC


Sodium Chloride 1,000 ml @  1,000 mls/hr Q1H PRN IV hypotension;  Start 4/7/20 

at 10:43;  Stop 4/7/20 at 16:42;  Status DC


Sodium Chloride 1,000 ml @  400 mls/hr Q2H30M PRN IV PATENCY;  Start 4/7/20 at 

10:43;  Stop 4/7/20 at 22:42;  Status DC


Info (PHARMACY MONITORING -- do not chart) 1 each PRN DAILY  PRN MC SEE TANIKA RUIZ;  Start 4/7/20 at 10:45;  Status UNV


Info (PHARMACY MONITORING -- do not chart) 1 each PRN DAILY  PRN MC SEE 

COMMENTS;  Start 4/7/20 at 10:45;  Status UNV


Sodium Chloride 90 meq/Potassium Chloride 15 meq/ Magnesium Sulfate 12 

meq/Calcium Gluconate 15 meq/ Multivitamins 10 ml/Chromium/ Copper/Manganese/ 

Seleni/Zn 0.5 ml/ Insulin Human Regular 25 unit/ Total Parenteral Nutrit

ion/Amino Acids/Dextrose/ Fat Emulsion Intravenous 1,400 ml @  58.333 mls/ hr 

TPN  CONT IV  Last administered on 4/7/20at 22:13;  Start 4/7/20 at 22:00;  Stop

4/8/20 at 21:59;  Status DC


Sodium Chloride 1,000 ml @  1,000 mls/hr Q1H PRN IV hypotension;  Start 4/8/20 

at 07:50;  Stop 4/8/20 at 13:49;  Status DC


Albumin Human 200 ml @  200 mls/hr 1X  ONCE IV ;  Start 4/8/20 at 08:00;  Stop 

4/8/20 at 08:53;  Status DC


Diphenhydramine HCl (Benadryl) 25 mg 1X PRN  PRN IV ITCHING;  Start 4/8/20 at 

08:00;  Stop 4/9/20 at 07:59;  Status DC


Diphenhydramine HCl (Benadryl) 25 mg 1X PRN  PRN IV ITCHING;  Start 4/8/20 at 

08:00;  Stop 4/9/20 at 07:59;  Status DC


Info (PHARMACY MONITORING -- do not chart) 1 each PRN DAILY  PRN MC SEE 

COMMENTS;  Start 4/8/20 at 08:00;  Stop 4/9/20 at 08:16;  Status DC


Albumin Human 50 ml @ 50 mls/hr 1X  ONCE IV ;  Start 4/8/20 at 08:53;  Stop 

4/8/20 at 08:56;  Status DC


Albumin Human 200 ml @  50 mls/hr PRN 1X  PRN IV HYPOTENSION Last administered 

on 4/14/20at 11:54;  Start 4/8/20 at 09:00


Meropenem 500 mg/ Sodium Chloride 50 ml @  100 mls/hr Q12H IV  Last administered

on 4/28/20at 10:45;  Start 4/8/20 at 10:00;  Stop 4/28/20 at 12:37;  Status DC


Sodium Chloride 90 meq/Magnesium Sulfate 12 meq/ Calcium Gluconate 15 meq/ 

Multivitamins 10 ml/Chromium/ Copper/Manganese/ Seleni/Zn 0.5 ml/ Insulin Human 

Regular 25 unit/ Total Parenteral Nutrition/Amino Acids/Dextrose/ Fat Emulsion 

Intravenous 1,400 ml @  58.333 mls/ hr TPN  CONT IV  Last administered on 

4/8/20at 21:41;  Start 4/8/20 at 22:00;  Stop 4/9/20 at 21:59;  Status DC


Sodium Chloride 1,000 ml @  1,000 mls/hr Q1H PRN IV hypotension;  Start 4/9/20 

at 07:58;  Stop 4/9/20 at 13:57;  Status DC


Albumin Human 200 ml @  200 mls/hr 1X PRN  PRN IV Hypotension Last administered 

on 4/9/20at 09:30;  Start 4/9/20 at 08:00;  Stop 4/9/20 at 13:59;  Status DC


Sodium Chloride 1,000 ml @  400 mls/hr Q2H30M PRN IV PATENCY;  Start 4/9/20 at 

07:58;  Stop 4/9/20 at 19:57;  Status DC


Info (PHARMACY MONITORING -- do not chart) 1 each PRN DAILY  PRN MC SEE 

COMMENTS;  Start 4/9/20 at 08:00;  Status Cancel


Info (PHARMACY MONITORING -- do not chart) 1 each PRN DAILY  PRN MC SEE 

COMMENTS;  Start 4/9/20 at 08:15;  Status UNV


Sodium Chloride 90 meq/Potassium Phosphate 5 mmol/ Magnesium Sulfate 12 

meq/Calcium Gluconate 15 meq/ Multivitamins 10 ml/Chromium/ Copper/Manganese/ 

Seleni/Zn 0.5 ml/ Insulin Human Regular 30 unit/ Total Parenteral Nutrition/Amin

o Acids/Dextrose/ Fat Emulsion Intravenous 1,400 ml @  58.333 mls/ hr TPN  CONT 

IV  Last administered on 4/9/20at 22:08;  Start 4/9/20 at 22:00;  Stop 4/10/20 

at 21:59;  Status DC


Linezolid/Dextrose 300 ml @  300 mls/hr Q12HR IV  Last administered on 4/20/20at

20:40;  Start 4/10/20 at 11:00;  Stop 4/21/20 at 08:10;  Status DC


Sodium Chloride 90 meq/Potassium Phosphate 15 mmol/ Magnesium Sulfate 12 

meq/Calcium Gluconate 15 meq/ Multivitamins 10 ml/Chromium/ Copper/Manganese/ 

Seleni/Zn 0.5 ml/ Insulin Human Regular 30 unit/ Total Parenteral 

Nutrition/Amino Acids/Dextrose/ Fat Emulsion Intravenous 1,400 ml @  58.333 mls/

hr TPN  CONT IV  Last administered on 4/10/20at 21:49;  Start 4/10/20 at 22:00; 

Stop 4/11/20 at 21:59;  Status DC


Sodium Chloride 90 meq/Potassium Phosphate 15 mmol/ Magnesium Sulfate 12 

meq/Calcium Gluconate 15 meq/ Multivitamins 10 ml/Chromium/ Copper/Manganese/ 

Seleni/Zn 0.5 ml/ Insulin Human Regular 40 unit/ Total Parenteral 

Nutrition/Amino Acids/Dextrose/ Fat Emulsion Intravenous 1,400 ml @  58.333 mls/

hr TPN  CONT IV  Last administered on 4/11/20at 21:21;  Start 4/11/20 at 22:00; 

Stop 4/12/20 at 21:59;  Status DC


Sodium Chloride 1,000 ml @  1,000 mls/hr Q1H PRN IV hypotension;  Start 4/11/20 

at 13:26;  Stop 4/11/20 at 19:25;  Status DC


Albumin Human 200 ml @  200 mls/hr 1X PRN  PRN IV Hypotension Last administered 

on 4/11/20at 15:00;  Start 4/11/20 at 13:30;  Stop 4/11/20 at 19:29;  Status DC


Sodium Chloride (Normal Saline Flush) 10 ml 1X PRN  PRN IV AP catheter pack;  

Start 4/11/20 at 13:30;  Stop 4/12/20 at 13:29;  Status DC


Sodium Chloride (Normal Saline Flush) 10 ml 1X PRN  PRN IV  catheter pack;  

Start 4/11/20 at 13:30;  Stop 4/12/20 at 13:29;  Status DC


Sodium Chloride 1,000 ml @  400 mls/hr Q2H30M PRN IV PATENCY;  Start 4/11/20 at 

13:26;  Stop 4/12/20 at 01:25;  Status DC


Info (PHARMACY MONITORING -- do not chart) 1 each PRN DAILY  PRN MC SEE 

COMMENTS;  Start 4/11/20 at 13:30;  Stop 4/11/20 at 13:33;  Status DC


Info (PHARMACY MONITORING -- do not chart) 1 each PRN DAILY  PRN MC SEE 

COMMENTS;  Start 4/11/20 at 13:30;  Stop 4/11/20 at 13:34;  Status DC


Sodium Chloride 90 meq/Potassium Phosphate 19 mmol/ Magnesium Sulfate 12 

meq/Calcium Gluconate 15 meq/ Multivitamins 10 ml/Chromium/ Copper/Manganese/ 

Seleni/Zn 0.5 ml/ Insulin Human Regular 40 unit/ Total Parenteral 

Nutrition/Amino Acids/Dextrose/ Fat Emulsion Intravenous 1,400 ml @  58.333 mls/

hr TPN  CONT IV  Last administered on 4/12/20at 21:54;  Start 4/12/20 at 22:00; 

Stop 4/13/20 at 21:59;  Status DC


Sodium Chloride 1,000 ml @  1,000 mls/hr Q1H PRN IV hypotension;  Start 4/13/20 

at 09:35;  Stop 4/13/20 at 15:34;  Status DC


Albumin Human 200 ml @  200 mls/hr 1X PRN  PRN IV Hypotension;  Start 4/13/20 at

09:45;  Stop 4/13/20 at 15:44;  Status DC


Diphenhydramine HCl (Benadryl) 25 mg 1X PRN  PRN IV ITCHING;  Start 4/13/20 at 

09:45;  Stop 4/14/20 at 09:44;  Status DC


Diphenhydramine HCl (Benadryl) 25 mg 1X PRN  PRN IV ITCHING;  Start 4/13/20 at 

09:45;  Stop 4/14/20 at 09:44;  Status DC


Sodium Chloride 1,000 ml @  400 mls/hr Q2H30M PRN IV PATENCY;  Start 4/13/20 at 

09:35;  Stop 4/13/20 at 21:34;  Status DC


Info (PHARMACY MONITORING -- do not chart) 1 each PRN DAILY  PRN MC SEE 

COMMENTS;  Start 4/13/20 at 09:45;  Status Cancel


Sodium Chloride 100 meq/Potassium Phosphate 19 mmol/ Magnesium Sulfate 12 

meq/Calcium Gluconate 15 meq/ Multivitamins 10 ml/Chromium/ Copper/Manganese/ 

Seleni/Zn 0.5 ml/ Insulin Human Regular 40 unit/ Potassium Chloride 20 meq/ 

Total Parenteral Nutrition/Amino Acids/Dextrose/ Fat Emulsion Intravenous 1,400 

ml @  58.333 mls/ hr TPN  CONT IV  Last administered on 4/13/20at 22:02;  Start 

4/13/20 at 22:00;  Stop 4/14/20 at 21:59;  Status DC


Furosemide (Lasix) 40 mg 1X  ONCE IVP  Last administered on 4/13/20at 14:39;  

Start 4/13/20 at 14:30;  Stop 4/13/20 at 14:31;  Status DC


Metronidazole 100 ml @  100 mls/hr Q8HRS IV  Last administered on 4/21/20at 

06:04;  Start 4/14/20 at 10:00;  Stop 4/21/20 at 08:10;  Status DC


Sodium Chloride 1,000 ml @  1,000 mls/hr Q1H PRN IV hypotension;  Start 4/14/20 

at 08:00;  Stop 4/14/20 at 13:59;  Status DC


Albumin Human 200 ml @  200 mls/hr 1X PRN  PRN IV Hypotension;  Start 4/14/20 at

08:00;  Stop 4/14/20 at 13:59;  Status DC


Sodium Chloride 1,000 ml @  400 mls/hr Q2H30M PRN IV PATENCY;  Start 4/14/20 at 

08:00;  Stop 4/14/20 at 19:59;  Status DC


Info (PHARMACY MONITORING -- do not chart) 1 each PRN DAILY  PRN MC SEE 

COMMENTS;  Start 4/14/20 at 11:30;  Status UNV


Info (PHARMACY MONITORING -- do not chart) 1 each PRN DAILY  PRN MC SEE 

COMMENTS;  Start 4/14/20 at 11:30;  Stop 4/16/20 at 12:13;  Status DC


Sodium Chloride 100 meq/Potassium Phosphate 19 mmol/ Magnesium Sulfate 12 

meq/Calcium Gluconate 15 meq/ Multivitamins 10 ml/Chromium/ Copper/Manganese/ 

Seleni/Zn 0.5 ml/ Insulin Human Regular 40 unit/ Potassium Chloride 20 meq/ 

Total Parenteral Nutrition/Amino Acids/Dextrose/ Fat Emulsion Intravenous 1,400 

ml @  58.333 mls/ hr TPN  CONT IV  Last administered on 4/14/20at 21:52;  Start 

4/14/20 at 22:00;  Stop 4/15/20 at 21:59;  Status DC


Sodium Chloride (Normal Saline Flush) 10 ml QSHIFT  PRN IV AFTER MEDS AND BLOOD 

DRAWS;  Start 4/14/20 at 15:00


Sodium Chloride (Normal Saline Flush) 10 ml PRN Q5MIN  PRN IV AFTER MEDS AND B

LOOD DRAWS;  Start 4/14/20 at 15:00


Sodium Chloride (Normal Saline Flush) 20 ml PRN Q5MIN  PRN IV AFTER MEDS AND 

BLOOD DRAWS;  Start 4/14/20 at 15:00


Sodium Chloride 100 meq/Potassium Phosphate 19 mmol/ Magnesium Sulfate 12 

meq/Calcium Gluconate 15 meq/ Multivitamins 10 ml/Chromium/ Copper/Manganese/ 

Seleni/Zn 0.5 ml/ Insulin Human Regular 40 unit/ Potassium Chloride 20 meq/ 

Total Parenteral Nutrition/Amino Acids/Dextrose/ Fat Emulsion Intravenous 1,400 

ml @  58.333 mls/ hr TPN  CONT IV  Last administered on 4/15/20at 21:20;  Start 

4/15/20 at 22:00;  Stop 4/16/20 at 21:59;  Status DC


Lidocaine HCl (Buffered Lidocaine 1%) 3 ml STK-MED ONCE .ROUTE ;  Start 4/15/20 

at 13:16;  Stop 4/15/20 at 13:16;  Status DC


Lidocaine HCl (Buffered Lidocaine 1%) 6 ml 1X  ONCE INJ  Last administered on 

4/15/20at 13:45;  Start 4/15/20 at 13:30;  Stop 4/15/20 at 13:31;  Status DC


Albumin Human 100 ml @  100 mls/hr 1X  ONCE IV  Last administered on 4/15/20at 

15:41;  Start 4/15/20 at 15:00;  Stop 4/15/20 at 15:59;  Status DC


Albumin Human 50 ml @ 50 mls/hr 1X  ONCE IV  Last administered on 4/15/20at 

15:00;  Start 4/15/20 at 15:00;  Stop 4/15/20 at 15:59;  Status DC


Info (PHARMACY MONITORING -- do not chart) 1 each PRN DAILY  PRN MC SEE 

COMMENTS;  Start 4/16/20 at 11:30;  Status Cancel


Info (PHARMACY MONITORING -- do not chart) 1 each PRN DAILY  PRN MC SEE 

COMMENTS;  Start 4/16/20 at 11:30;  Status UNV


Sodium Chloride 100 meq/Potassium Phosphate 10 mmol/ Magnesium Sulfate 12 

meq/Calcium Gluconate 15 meq/ Multivitamins 10 ml/Chromium/ Copper/Manganese/ 

Seleni/Zn 0.5 ml/ Insulin Human Regular 35 unit/ Potassium Chloride 20 meq/ 

Total Parenteral Nutrition/Amino Acids/Dextrose/ Fat Emulsion Intravenous 1,400 

ml @  58.333 mls/ hr TPN  CONT IV  Last administered on 4/16/20at 22:10;  Start 

4/16/20 at 22:00;  Stop 4/17/20 at 21:59;  Status DC


Sodium Chloride 100 meq/Potassium Phosphate 5 mmol/ Magnesium Sulfate 12 meq/C

alcium Gluconate 15 meq/ Multivitamins 10 ml/Chromium/ Copper/Manganese/ 

Seleni/Zn 0.5 ml/ Insulin Human Regular 35 unit/ Potassium Chloride 20 meq/ 

Total Parenteral Nutrition/Amino Acids/Dextrose/ Fat Emulsion Intravenous 1,400 

ml @  58.333 mls/ hr TPN  CONT IV  Last administered on 4/17/20at 22:59;  Start 

4/17/20 at 22:00;  Stop 4/18/20 at 21:59;  Status DC


Sodium Chloride 1,000 ml @  1,000 mls/hr Q1H PRN IV hypotension;  Start 4/18/20 

at 08:27;  Stop 4/18/20 at 14:26;  Status DC


Albumin Human 200 ml @  200 mls/hr 1X PRN  PRN IV Hypotension Last administered 

on 4/18/20at 09:18;  Start 4/18/20 at 08:30;  Stop 4/18/20 at 14:29;  Status DC


Sodium Chloride 1,000 ml @  400 mls/hr Q2H30M PRN IV PATENCY;  Start 4/18/20 at 

08:27;  Stop 4/18/20 at 20:26;  Status DC


Info (PHARMACY MONITORING -- do not chart) 1 each PRN DAILY  PRN MC SEE 

COMMENTS;  Start 4/18/20 at 08:30;  Status Cancel


Info (PHARMACY MONITORING -- do not chart) 1 each PRN DAILY  PRN MC SEE 

COMMENTS;  Start 4/18/20 at 08:30;  Stop 4/26/20 at 13:10;  Status DC


Sodium Chloride 100 meq/Potassium Chloride 40 meq/ Magnesium Sulfate 15 

meq/Calcium Gluconate 15 meq/ Multivitamins 10 ml/Chromium/ Copper/Manganese/ 

Seleni/Zn 0.5 ml/ Insulin Human Regular 35 unit/ Total Parenteral 

Nutrition/Amino Acids/Dextrose/ Fat Emulsion Intravenous 1,400 ml @  58.333 mls/

hr TPN  CONT IV  Last administered on 4/18/20at 22:00;  Start 4/18/20 at 22:00; 

Stop 4/19/20 at 21:59;  Status DC


Potassium Chloride/Water 100 ml @  100 mls/hr 1X  ONCE IV  Last administered on 

4/18/20at 17:28;  Start 4/18/20 at 14:45;  Stop 4/18/20 at 15:44;  Status DC


Sodium Chloride 100 meq/Potassium Chloride 40 meq/ Magnesium Sulfate 15 

meq/Calcium Gluconate 15 meq/ Multivitamins 10 ml/Chromium/ Copper/Manganese/ 

Seleni/Zn 0.5 ml/ Insulin Human Regular 35 unit/ Total Parenteral 

Nutrition/Amino Acids/Dextrose/ Fat Emulsion Intravenous 1,400 ml @  58.333 mls/

hr TPN  CONT IV  Last administered on 4/19/20at 22:46;  Start 4/19/20 at 22:00; 

Stop 4/20/20 at 21:59;  Status DC


Sodium Chloride 100 meq/Potassium Chloride 40 meq/ Magnesium Sulfate 20 

meq/Calcium Gluconate 15 meq/ Multivitamins 10 ml/Chromium/ Copper/Manganese/ 

Seleni/Zn 0.5 ml/ Insulin Human Regular 35 unit/ Total Parenteral 

Nutrition/Amino Acids/Dextrose/ Fat Emulsion Intravenous 1,400 ml @  58.333 mls/

hr TPN  CONT IV  Last administered on 4/20/20at 22:31;  Start 4/20/20 at 22:00; 

Stop 4/21/20 at 21:59;  Status DC


Fentanyl Citrate (Fentanyl 2ml Vial) 50 mcg PRN Q2HR  PRN IVP PAIN Last 

administered on 4/27/20at 13:32;  Start 4/20/20 at 21:00;  Stop 4/28/20 at 

12:53;  Status DC


Fentanyl Citrate (Fentanyl 2ml Vial) 25 mcg PRN Q2HR  PRN IVP PAIN;  Start 

4/20/20 at 21:00;  Stop 4/28/20 at 12:54;  Status DC


Enoxaparin Sodium (Lovenox 100mg Syringe) 100 mg Q12HR SQ ;  Start 4/21/20 at 

21:00;  Status UNV


Amino Acids/ Glycerin/ Electrolytes 1,000 ml @  75 mls/hr [id number] IV ;  Start 

4/20/20 at 21:15;  Status UNV


Sodium Chloride 1,000 ml @  1,000 mls/hr Q1H PRN IV hypotension;  Start 4/21/20 

at 07:56;  Stop 4/21/20 at 13:55;  Status DC


Albumin Human 200 ml @  200 mls/hr 1X PRN  PRN IV Hypotension Last administered 

on 4/21/20at 08:40;  Start 4/21/20 at 08:00;  Stop 4/21/20 at 13:59;  Status DC


Sodium Chloride 1,000 ml @  400 mls/hr Q2H30M PRN IV PATENCY;  Start 4/21/20 at 

07:56;  Stop 4/21/20 at 19:55;  Status DC


Info (PHARMACY MONITORING -- do not chart) 1 each PRN DAILY  PRN MC SEE 

COMMENTS;  Start 4/21/20 at 08:00;  Status UNV


Info (PHARMACY MONITORING -- do not chart) 1 each PRN DAILY  PRN MC SEE 

COMMENTS;  Start 4/21/20 at 08:00;  Status UNV


Daptomycin 430 mg/ Sodium Chloride 50 ml @  100 mls/hr Q24H IV  Last 

administered on 4/21/20at 12:35;  Start 4/21/20 at 09:00;  Stop 4/21/20 at 

12:49;  Status DC


Sodium Chloride 100 meq/Potassium Chloride 40 meq/ Magnesium Sulfate 20 

meq/Calcium Gluconate 15 meq/ Multivitamins 10 ml/Chromium/ Copper/Manganese/ 

Seleni/Zn 0.5 ml/ Insulin Human Regular 35 unit/ Total Parenteral 

Nutrition/Amino Acids/Dextrose/ Fat Emulsion Intravenous 1,400 ml @  58.333 mls/

hr TPN  CONT IV  Last administered on 4/21/20at 21:26;  Start 4/21/20 at 22:00; 

Stop 4/22/20 at 21:59;  Status DC


Daptomycin 430 mg/ Sodium Chloride 50 ml @  100 mls/hr Q48H IV ;  Start 4/23/20 

at 09:00;  Stop 4/22/20 at 11:55;  Status DC


Sodium Chloride 100 meq/Potassium Chloride 40 meq/ Magnesium Sulfate 20 

meq/Calcium Gluconate 15 meq/ Multivitamins 10 ml/Chromium/ Copper/Manganese/ 

Seleni/Zn 0.5 ml/ Insulin Human Regular 35 unit/ Total Parenteral 

Nutrition/Amino Acids/Dextrose/ Fat Emulsion Intravenous 1,400 ml @  58.333 mls/

hr TPN  CONT IV  Last administered on 4/22/20at 22:27;  Start 4/22/20 at 22:00; 

Stop 4/23/20 at 21:59;  Status DC


Daptomycin 430 mg/ Sodium Chloride 50 ml @  100 mls/hr Q24H IV  Last 

administered on 4/24/20at 15:07;  Start 4/22/20 at 13:00;  Stop 4/25/20 at 

13:15;  Status DC


Sodium Chloride 100 meq/Potassium Chloride 40 meq/ Magnesium Sulfate 20 

meq/Calcium Gluconate 10 meq/ Multivitamins 10 ml/Chromium/ Copper/Manganese/ 

Seleni/Zn 0.5 ml/ Insulin Human Regular 35 unit/ Total Parenteral 

Nutrition/Amino Acids/Dextrose/ Fat Emulsion Intravenous 1,400 ml @  58.333 mls/

hr TPN  CONT IV  Last administered on 4/24/20at 00:06;  Start 4/23/20 at 22:00; 

Stop 4/24/20 at 21:59;  Status DC


Alteplase, Recombinant (Cathflo For Central Catheter Clearance) 1 mg 1X  ONCE 

INT CAT  Last administered on 4/24/20at 11:44;  Start 4/24/20 at 10:45;  Stop 

4/24/20 at 10:46;  Status DC


Ondansetron HCl (Zofran) 4 mg PRN Q6HRS  PRN IV NAUSEA/VOMITING;  Start 4/27/20 

at 07:00;  Stop 4/28/20 at 06:59;  Status DC


Fentanyl Citrate (Fentanyl 2ml Vial) 25 mcg PRN Q5MIN  PRN IV MILD PAIN 1-3;  

Start 4/27/20 at 07:00;  Stop 4/28/20 at 06:59;  Status DC


Fentanyl Citrate (Fentanyl 2ml Vial) 50 mcg PRN Q5MIN  PRN IV MODERATE TO SEVERE

PAIN Last administered on 4/27/20at 10:17;  Start 4/27/20 at 07:00;  Stop 

4/28/20 at 06:59;  Status DC


Ringer's Solution 1,000 ml @  30 mls/hr Q24H IV ;  Start 4/27/20 at 07:00;  Stop

4/27/20 at 18:59;  Status DC


Lidocaine HCl (Xylocaine-Mpf 1% 2ml Vial) 2 ml PRN 1X  PRN ID PRIOR TO IV START;

 Start 4/27/20 at 07:00;  Stop 4/28/20 at 06:59;  Status DC


Prochlorperazine Edisylate (Compazine) 5 mg PACU PRN  PRN IV NAUSEA, MRX1;  

Start 4/27/20 at 07:00;  Stop 4/28/20 at 06:59;  Status DC


Sodium Acetate 50 meq/Potassium Acetate 55 meq/ Magnesium Sulfate 20 meq/Calcium

Gluconate 10 meq/ Multivitamins 10 ml/Chromium/ Copper/Manganese/ Seleni/Zn 0.5 

ml/ Insulin Human Regular 35 unit/ Total Parenteral Nutrition/Amino 

Acids/Dextrose/ Fat Emulsion Intravenous 1,400 ml @  58.333 mls/ hr TPN  CONT IV

;  Start 4/24/20 at 22:00;  Stop 4/24/20 at 14:15;  Status DC


Sodium Acetate 50 meq/Potassium Acetate 55 meq/ Magnesium Sulfate 20 meq/Calcium

Gluconate 10 meq/ Multivitamins 10 ml/Chromium/ Copper/Manganese/ Seleni/Zn 0.5 

ml/ Insulin Human Regular 35 unit/ Total Parenteral Nutrition/Amino 

Acids/Dextrose/ Fat Emulsion Intravenous 1,800 ml @  75 mls/hr TPN  CONT IV  

Last administered on 4/24/20at 22:38;  Start 4/24/20 at 22:00;  Stop 4/25/20 at 

21:59;  Status DC


Sodium Chloride 1,000 ml @  1,000 mls/hr Q1H PRN IV hypotension;  Start 4/24/20 

at 15:31;  Stop 4/24/20 at 21:30;  Status DC


Diphenhydramine HCl (Benadryl) 25 mg 1X PRN  PRN IV ITCHING;  Start 4/24/20 at 

15:45;  Stop 4/25/20 at 15:44;  Status DC


Diphenhydramine HCl (Benadryl) 25 mg 1X PRN  PRN IV ITCHING;  Start 4/24/20 at 

15:45;  Stop 4/25/20 at 15:44;  Status DC


Sodium Chloride 1,000 ml @  400 mls/hr Q2H30M PRN IV PATENCY;  Start 4/24/20 at 

15:31;  Stop 4/25/20 at 03:30;  Status DC


Info (PHARMACY MONITORING -- do not chart) 1 each PRN DAILY  PRN MC SEE 

COMMENTS;  Start 4/24/20 at 15:45


Sodium Acetate 50 meq/Potassium Acetate 55 meq/ Magnesium Sulfate 20 meq/Calcium

Gluconate 10 meq/ Multivitamins 10 ml/Chromium/ Copper/Manganese/ Seleni/Zn 0.5 

ml/ Insulin Human Regular 35 unit/ Total Parenteral Nutrition/Amino 

Acids/Dextrose/ Fat Emulsion Intravenous 1,800 ml @  75 mls/hr TPN  CONT IV  

Last administered on 4/25/20at 22:03;  Start 4/25/20 at 22:00;  Stop 4/26/20 at 

21:59;  Status DC


Daptomycin 430 mg/ Sodium Chloride 50 ml @  100 mls/hr Q24H IV  Last 

administered on 4/28/20at 14:32;  Start 4/25/20 at 13:00


Heparin Sodium (Porcine) 1000 unit/Sodium Chloride 1,001 ml @  1,001 mls/hr 1X  

ONCE IRR ;  Start 4/27/20 at 06:00;  Stop 4/27/20 at 06:59;  Status DC


Potassium Acetate 55 meq/Magnesium Sulfate 20 meq/ Calcium Gluconate 10 meq/ 

Multivitamins 10 ml/Chromium/ Copper/Manganese/ Seleni/Zn 0.5 ml/ Insulin Human 

Regular 35 unit/ Total Parenteral Nutrition/Amino Acids/Dextrose/ Fat Emulsion 

Intravenous 1,920 ml @  80 mls/hr TPN  CONT IV  Last administered on 4/26/20at 

22:10;  Start 4/26/20 at 22:00;  Stop 4/27/20 at 21:59;  Status DC


Dexamethasone Sodium Phosphate (Decadron) 4 mg STK-MED ONCE .ROUTE ;  Start 

4/27/20 at 10:56;  Stop 4/27/20 at 10:57;  Status DC


Ondansetron HCl (Zofran) 4 mg STK-MED ONCE .ROUTE ;  Start 4/27/20 at 10:56;  

Stop 4/27/20 at 10:57;  Status DC


Rocuronium Bromide (Zemuron) 50 mg STK-MED ONCE .ROUTE ;  Start 4/27/20 at 

10:56;  Stop 4/27/20 at 10:57;  Status DC


Fentanyl Citrate (Fentanyl 2ml Vial) 100 mcg STK-MED ONCE .ROUTE ;  Start 

4/27/20 at 10:56;  Stop 4/27/20 at 10:57;  Status DC


Bupivacaine HCl/ Epinephrine Bitart (Sensorcain-Epi 0.5%-1:580592 Mpf) 30 ml 

STK-MED ONCE .ROUTE  Last administered on 4/27/20at 12:01;  Start 4/27/20 at 

10:58;  Stop 4/27/20 at 10:58;  Status DC


Cellulose (Surgicel Hemostat 2x14) 1 each STK-MED ONCE .ROUTE ;  Start 4/27/20 

at 10:58;  Stop 4/27/20 at 10:59;  Status DC


Iohexol (Omnipaque 300 Mg/ml) 50 ml STK-MED ONCE .ROUTE ;  Start 4/27/20 at 

10:58;  Stop 4/27/20 at 10:59;  Status DC


Cellulose (Surgicel Hemostat 4x8) 1 each STK-MED ONCE .ROUTE ;  Start 4/27/20 at

10:58;  Stop 4/27/20 at 10:59;  Status DC


Bisacodyl (Dulcolax Supp) 10 mg STK-MED ONCE .ROUTE ;  Start 4/27/20 at 10:59;  

Stop 4/27/20 at 10:59;  Status DC


Heparin Sodium (Porcine) 1000 unit/Sodium Chloride 1,001 ml @  1,001 mls/hr 1X  

ONCE IRR ;  Start 4/27/20 at 12:00;  Stop 4/27/20 at 12:59;  Status DC


Propofol 20 ml @ As Directed STK-MED ONCE IV ;  Start 4/27/20 at 11:05;  Stop 

4/27/20 at 11:05;  Status DC


Sevoflurane (Ultane) 90 ml STK-MED ONCE IH ;  Start 4/27/20 at 11:05;  Stop 

4/27/20 at 11:05;  Status DC


Sevoflurane (Ultane) 60 ml STK-MED ONCE IH ;  Start 4/27/20 at 12:26;  Stop 

4/27/20 at 12:27;  Status DC


Propofol 20 ml @ As Directed STK-MED ONCE IV ;  Start 4/27/20 at 12:26;  Stop 

4/27/20 at 12:27;  Status DC


Phenylephrine HCl (PHENYLEPHRINE in 0.9% NACL PF) 1 mg STK-MED ONCE IV ;  Start 

4/27/20 at 12:34;  Stop 4/27/20 at 12:34;  Status DC


Heparin Sodium (Porcine) (Heparin Sodium) 5,000 unit Q12HR SQ  Last administered

on 4/28/20at 22:03;  Start 4/27/20 at 21:00


Sodium Chloride (Normal Saline Flush) 3 ml QSHIFT  PRN IV AFTER MEDS AND BLOOD 

DRAWS;  Start 4/27/20 at 13:45


Naloxone HCl (Narcan) 0.4 mg PRN Q2MIN  PRN IV SEE INSTRUCTIONS;  Start 4/27/20 

at 13:45


Sodium Chloride 1,000 ml @  25 mls/hr Q24H IV  Last administered on 4/28/20at 

13:37;  Start 4/27/20 at 13:37


Naloxone HCl (Narcan) 0.4 mg PRN Q2MIN  PRN IV SEE INSTRUCTIONS;  Start 4/27/20 

at 14:30;  Status UNV


Sodium Chloride 1,000 ml @  25 mls/hr Q24H IV ;  Start 4/27/20 at 14:30;  Status

UNV


Hydromorphone HCl 30 ml @ 0 mls/hr CONT PRN  PRN IV PER PROTOCOL Last a

dministered on 4/28/20at 12:27;  Start 4/27/20 at 14:30


Potassium Acetate 55 meq/Magnesium Sulfate 20 meq/ Calcium Gluconate 10 meq/ 

Multivitamins 10 ml/Chromium/ Copper/Manganese/ Seleni/Zn 0.5 ml/ Insulin Human 

Regular 35 unit/ Total Parenteral Nutrition/Amino Acids/Dextrose/ Fat Emulsion 

Intravenous 1,920 ml @  80 mls/hr TPN  CONT IV  Last administered on 4/27/20at 

22:01;  Start 4/27/20 at 22:00;  Stop 4/28/20 at 21:59;  Status DC


Bumetanide (Bumex) 2 mg [id number] IV  Last administered on 4/28/20at 16:45;  Start 

4/28/20 at 14:00


Meropenem 1 gm/ Sodium Chloride 100 ml @  200 mls/hr Q8HRS IV  Last administered

on 4/29/20at 05:23;  Start 4/28/20 at 14:00


Potassium Acetate 55 meq/Magnesium Sulfate 20 meq/ Calcium Gluconate 10 meq/ 

Multivitamins 10 ml/Chromium/ Copper/Manganese/ Seleni/Zn 0.5 ml/ Insulin Human 

Regular 35 unit/ Total Parenteral Nutrition/Amino Acids/Dextrose/ Fat Emulsion I

ntravenous 1,920 ml @  80 mls/hr TPN  CONT IV  Last administered on 4/28/20at 

22:02;  Start 4/28/20 at 22:00;  Stop 4/29/20 at 21:59


Hydromorphone HCl (Dilaudid Standard PCA) 12 mg STK-MED ONCE IV ;  Start 4/27/20

at 14:35;  Stop 4/28/20 at 13:53;  Status DC


Artificial Tears (Artificial Tears) 1 drop PRN Q15MIN  PRN OU DRY EYE;  Start 

4/29/20 at 05:30





Active Scripts


Active


Reported


Bisoprolol Fumarate 5 Mg Tablet 10 Mg PO DAILY


Vitals/I & O





Vital Sign - Last 24 Hours








 4/28/20 4/28/20 4/28/20 4/28/20





 09:00 09:24 10:00 10:49


 


Pulse 86  106 


 


Resp 20  21 


 


B/P (MAP) 92/65 (74)  108/87 (94) 


 


Pulse Ox 98 99 100 97


 


O2 Delivery Ventilator Ventilator Ventilator Ventilator





 4/28/20 4/28/20 4/28/20 4/28/20





 11:00 12:00 12:00 12:27


 


Temp  100.3  





  100.3  


 


Pulse 92 140  


 


Resp 18 26  19


 


B/P (MAP) 140/63 (88) 153/101 (118)  


 


Pulse Ox 98 99  98


 


O2 Delivery Ventilator Ventilator Mechanical Ventilator BiPAP/CPAP


 


    





    





 4/28/20 4/28/20 4/28/20 4/28/20





 13:00 13:00 13:13 14:00


 


Pulse  94  96


 


Resp 21 19  22


 


B/P (MAP)  154/81 (105)  117/73 (88)


 


Pulse Ox 100 97 97 99


 


O2 Delivery Ventilator Ventilator Ventilator Ventilator





 4/28/20 4/28/20 4/28/20 4/28/20





 14:50 15:00 16:00 16:00


 


Pulse  126 96 


 


Resp  17 18 


 


B/P (MAP)  194/88 (123) 127/90 (102) 


 


Pulse Ox 99 96 98 


 


O2 Delivery T-Tube Tracheal Collar Tracheal Collar Trach Collar


 


O2 Flow Rate 8.0   





 4/28/20 4/28/20 4/28/20 4/28/20





 17:00 18:00 19:00 19:50


 


Temp 99.5   





 99.5   


 


Pulse 148 80 148 


 


Resp 20 17 18 


 


B/P (MAP) 184/87 (119) 94/47 (63) 98/57 (71) 


 


Pulse Ox 95 99 98 98


 


O2 Delivery Tracheal Collar Ventilator Ventilator Ventilator


 


    





    





 4/28/20 4/28/20 4/28/20 4/28/20





 20:00 20:00 21:00 22:00


 


Temp 99.6   





 99.6   


 


Pulse 111  88 86


 


Resp 20  18 18


 


B/P (MAP) 118/63 (81)  94/47 (63) 86/45 (59)


 


Pulse Ox 99  98 97


 


O2 Delivery Ventilator Trach Collar Ventilator Ventilator


 


    





    





 4/28/20 4/29/20 4/29/20 4/29/20





 23:00 00:00 00:00 00:13


 


Temp   99.2 





   99.2 


 


Pulse 92  106 


 


Resp 18  18 


 


B/P (MAP) 83/43 (56)  137/85 (102) 


 


Pulse Ox 96  97 98


 


O2 Delivery Ventilator Trach Collar Ventilator Ventilator


 


    





    





 4/29/20 4/29/20 4/29/20 4/29/20





 01:00 02:00 03:00 03:19


 


Pulse 91 83 83 


 


Resp 18 18 18 


 


B/P (MAP) 107/56 (73) 95/52 (66) 105/52 (69) 


 


Pulse Ox 98 98 98 98


 


O2 Delivery Ventilator Ventilator Ventilator 





 4/29/20 4/29/20 4/29/20 4/29/20





 04:00 04:00 05:00 06:00


 


Temp  100.6  





  100.6  


 


Pulse  100 88 85


 


Resp  22 18 18


 


B/P (MAP)  91/53 (66) 113/56 (75) 118/59 (78)


 


Pulse Ox  98 98 95


 


O2 Delivery Trach Collar Ventilator Ventilator Ventilator


 


    





    





 4/29/20 4/29/20 4/29/20 4/29/20





 07:00 07:57 08:00 08:00


 


Temp   99.3 





   99.3 


 


Pulse 80  86 


 


Resp 17  17 


 


B/P (MAP) 98/46 (63)  140/86 (104) 


 


Pulse Ox 98 98 98 


 


O2 Delivery Ventilator Ventilator Ventilator Mechanical Ventilator














Intake and Output   


 


 4/28/20 4/28/20 4/29/20





 14:59 22:59 06:59


 


Intake Total 100 ml 1565 ml 1640.5 ml


 


Output Total 965 ml 1115 ml 985 ml


 


Balance -865 ml 450 ml 655.5 ml











Hemodynamically unstable?:  No


Is patient in severe pain?:  No


Is NPO status required?:  Yes











GAETANO GONCALVES MD        Apr 29, 2020 08:34

## 2020-04-29 NOTE — PDOC
Renal-Progress Notes


Subjective Notes


Notes


SITTING UP





History of Present Illness


Hx of present illness


FEVER TODAY





Vitals


Vitals





Vital Signs








  Date Time  Temp Pulse Resp B/P (MAP) Pulse Ox O2 Delivery O2 Flow Rate FiO2


 


4/29/20 12:21     98 Ventilator  


 


4/29/20 12:00 100.0 141 38 159/68 (98)    





 100.0       


 


4/28/20 14:50       8.0 








Weight


Weight [ ]





I.O.


Intake and Output











Intake and Output 


 


 4/29/20





 07:00


 


Intake Total 3305.5 ml


 


Output Total 3065 ml


 


Balance 240.5 ml


 


 


 


IV Total 3305.5 ml


 


Output Urine Total 2960 ml


 


Drainage Total 105 ml











Labs


Labs





Laboratory Tests








Test


 4/28/20


18:44 4/28/20


23:40 4/29/20


06:10 4/29/20


06:14


 


Glucose (Fingerstick)


 116 mg/dL


(70-99) 94 mg/dL


(70-99) 


 87 mg/dL


(70-99)


 


White Blood Count


 


 


 10.4 x10^3/uL


(4.0-11.0) 





 


Red Blood Count


 


 


 2.51 x10^6/uL


(3.50-5.40) 





 


Hemoglobin


 


 


 7.4 g/dL


(12.0-15.5) 





 


Hematocrit


 


 


 22.9 %


(36.0-47.0) 





 


Mean Corpuscular Volume   91 fL ()  


 


Mean Corpuscular Hemoglobin   29 pg (25-35)  


 


Mean Corpuscular Hemoglobin


Concent 


 


 32 g/dL


(31-37) 





 


Red Cell Distribution Width


 


 


 18.4 %


(11.5-14.5) 





 


Platelet Count


 


 


 439 x10^3/uL


(140-400) 





 


Sodium Level


 


 


 151 mmol/L


(136-145) 





 


Potassium Level


 


 


 3.6 mmol/L


(3.5-5.1) 





 


Chloride Level


 


 


 114 mmol/L


() 





 


Carbon Dioxide Level


 


 


 29 mmol/L


(21-32) 





 


Anion Gap   8 (6-14)  


 


Blood Urea Nitrogen


 


 


 58 mg/dL


(7-20) 





 


Creatinine


 


 


 1.0 mg/dL


(0.6-1.0) 





 


Estimated GFR


(Cockcroft-Gault) 


 


 58.9 


 





 


Glucose Level


 


 


 104 mg/dL


(70-99) 





 


Calcium Level


 


 


 8.2 mg/dL


(8.5-10.1) 





 


Test


 4/29/20


08:00 4/29/20


12:25 


 





 


White Blood Count


 14.5 x10^3/uL


(4.0-11.0) 


 


 





 


Red Blood Count


 2.89 x10^6/uL


(3.50-5.40) 


 


 





 


Hemoglobin


 8.5 g/dL


(12.0-15.5) 


 


 





 


Hematocrit


 26.9 %


(36.0-47.0) 


 


 





 


Mean Corpuscular Volume 93 fL ()    


 


Mean Corpuscular Hemoglobin 29 pg (25-35)    


 


Mean Corpuscular Hemoglobin


Concent 32 g/dL


(31-37) 


 


 





 


Red Cell Distribution Width


 18.5 %


(11.5-14.5) 


 


 





 


Platelet Count


 446 x10^3/uL


(140-400) 


 


 





 


Glucose (Fingerstick)


 


 125 mg/dL


(70-99) 


 














Micro


Micro





Microbiology


4/15/20 Aerobic and Anaerobic Culture - Final, Complete


          


4/15/20 Anaerobic Culture Result 1 ([facility]) - Final, Complete


          


4/15/20 Aerobic Culture - Final, Complete


          


4/15/20 Aerobic Culture Result 1 ([facility]) - Final, Complete


          


4/15/20 Gram Stain - Final, Complete


          


4/15/20 Gram Stain Result 1 ([facility]) - Final, Complete


          


4/15/20 Gram Stain Result 2 ([facility]) - Final, Complete


          


4/14/20 Blood Culture - Final, Complete


          NO GROWTH AFTER 5 DAYS


4/12/20 Urine Culture - Final, Complete


          


4/12/20 Urine Culture Result 1 ([facility]) - Final, Complete





Review of Systems


Constitutional:  yes: other (ON THE VENT)





Physical Exam


General Appearance:  other (ON THE VENT, TRACH)


Skin:  warm


Respiratory:  decreased breath sounds


Heart:  S1S2


Abdomen:  soft, bowel sounds present


Genitourinary:  bladder flat


Extremities:  pulses present, edema


Neurology:  alert, oriented, other





Assessment


Assessment


IMP





FEVER AND LEUCOCYTOSIS-?SEPSIS


HYPERNATREMIA


JED-ATN-MUCH IMPROVED AND OFF HD FOR NOW


ANEMIA


ANASARCA DUE TO 3RD SPACING


HYPERKALEMIA-RESOLVED


ACIDOSIS AND ACIDEMIA-RESOLVED


ACUTE REPS FAILURE-TRACH


ACUTE PANCREATITIS


MALNUTRITION





PLAN





CONT TO HOLD HD FOR NOW


TPN TO CONTINUE AS NEEDED


PRBC AS NEEDED


START YOLI IF NEEDED


VENT SUPPORT


CONT IV LASIX


ANTIBIOTICS-ID EVAL AND TX


WILL NEED LTAC


UPDATED FAMILY


WILL FOLLOW











BETH HEIN MD                 Apr 29, 2020 13:45

## 2020-04-29 NOTE — PDOC
Infectious Disease Note


Subjective:


Subjective


Patient alert awake


Still weak


Patient had fever of 100.6 earlier this a.m.


Remains on vent via trach, 


TPN





Vital Signs:


Vital Signs





Vital Signs








  Date Time  Temp Pulse Resp B/P (MAP) Pulse Ox O2 Delivery O2 Flow Rate FiO2


 


4/29/20 11:28     97 Ventilator  


 


4/29/20 11:00  83 18 90/61 (71)    


 


4/29/20 08:00 99.3       





 99.3       


 


4/28/20 14:50       8.0 











Physical Exam:


PHYSICAL EXAM


GENERAL: Propped up in bed, sedated weak appearing 


HEENT: Pupils equal, + NGT, oral cavity dry 


NECK:  Trach/vent 


LUNGS: rhonchi 


HEART:  S1, S2, regular 


ABDOMEN: Distended, hypoactive BS, drain placement (4/27 )


: [place] (4/14)


EXTREMITIES: Generalized edema, no cyanosis, SCDs bilaterally 


DERMATOLOGIC:  Warm and dry.  No generalized rash.  


CENTRAL NERVOUS SYSTEM: Extremely weak, nods to few simple questions 


HDC has been removed


[facility] (4/14) clean





Medications:


Inpatient Meds:





Current Medications








 Medications


  (Trade)  Dose


 Ordered  Sig/Yee  Start Time


 Stop Time Status Last Admin


Dose Admin


 


 Acetaminophen


  (Tylenol Supp)  650 mg  PRN Q6HRS  PRN  3/24/20 10:30


    4/27/20 00:32


650 MG


 


 Acetaminophen


  (Tylenol)  650 mg  PRN Q6HRS  PRN  3/21/20 03:36


    4/16/20 19:56


650 MG


 


 Albumin Human  200 ml @ 


 200 mls/hr  1X PRN  PRN  4/21/20 08:00


 4/21/20 13:59 DC 4/21/20 08:40


200 MLS/HR


 


 Albuterol Sulfate


  (Ventolin Neb


 Soln)  2.5 mg  1X  ONCE  3/17/20 22:30


 3/17/20 22:31 DC 3/18/20 00:56


2.5 MG


 


 Alteplase,


 Recombinant


  (Cathflo For


 Central Catheter


 Clearance)  1 mg  1X  ONCE  4/24/20 10:45


 4/24/20 10:46 DC 4/24/20 11:44


1 MG


 


 Amino Acids/


 Glycerin/


 Electrolytes  1,000 ml @ 


 75 mls/hr  [id number]  4/20/20 21:15


   UNV  





 


 Artificial Tears


  (Artificial


 Tears)  1 drop  PRN Q15MIN  PRN  4/29/20 05:30


     





 


 Atenolol


  (Tenormin)  100 mg  DAILY  3/17/20 09:00


 3/16/20 20:08 DC  





 


 Atropine Sulfate


  (ATROPINE 0.5mg


 SYRINGE)  0.5 mg  PRN Q5MIN  PRN  4/2/20 08:15


     





 


 Benzocaine


  (Hurricaine One)  1 spray  1X  ONCE  3/20/20 14:30


 3/20/20 14:31 DC 3/20/20 16:38


1 SPRAY


 


 Bisacodyl


  (Dulcolax Supp)  10 mg  STK-MED ONCE  4/27/20 10:59


 4/27/20 10:59 DC  





 


 Bumetanide


  (Bumex)  2 mg  [id number]  4/28/20 14:00


    4/29/20 08:42


2 MG


 


 Bupivacaine HCl/


 Epinephrine Bitart


  (Sensorcain-Epi


 0.5%-1:530156 Mpf)  30 ml  STK-MED ONCE  4/27/20 10:58


 4/27/20 10:58 DC 4/27/20 12:01


7 ML


 


 Calcium Carbonate/


 Glycine


  (Tums)  500 mg  PRN [id number]  PRN  3/18/20 17:45


     





 


 Calcium Chloride


 1000 mg/Sodium


 Chloride  110 ml @ 


 220 mls/hr  1X  ONCE  3/17/20 22:30


 3/17/20 22:59 DC 3/17/20 22:11


220 MLS/HR


 


 Calcium Chloride


 3000 mg/Sodium


 Chloride  1,030 ml @ 


 50 mls/hr  [id number]  3/19/20 08:00


 3/21/20 15:23 DC 3/21/20 02:17


50 MLS/HR


 


 Calcium Gluconate


  (Calcium


 Gluconate)  2,000 mg  1X  ONCE  3/19/20 02:15


 3/19/20 02:16 DC 3/19/20 02:19


2,000 MG


 


 Calcium Gluconate


 1000 mg/Sodium


 Chloride  110 ml @ 


 220 mls/hr  1X  ONCE  3/18/20 03:30


 3/18/20 03:59 DC 3/18/20 03:21


220 MLS/HR


 


 Calcium Gluconate


 2000 mg/Sodium


 Chloride  120 ml @ 


 220 mls/hr  1X  ONCE  3/18/20 07:30


 3/18/20 08:02 DC 3/18/20 09:05


220 MLS/HR


 


 Cefepime HCl


  (Maxipime)  2 gm  Q12HR  3/25/20 09:00


 4/8/20 09:58 DC 4/7/20 20:56


2 GM


 


 Cellulose


  (Surgicel


 Fibrillar 1x2)  1 each  STK-MED ONCE  4/6/20 11:00


 4/6/20 11:01 DC  





 


 Cellulose


  (Surgicel


 Hemostat 2x14)  1 each  STK-MED ONCE  4/27/20 10:58


 4/27/20 10:59 DC  





 


 Cellulose


  (Surgicel


 Hemostat 4x8)  1 each  STK-MED ONCE  4/27/20 10:58


 4/27/20 10:59 DC  





 


 Daptomycin 430 mg/


 Sodium Chloride  50 ml @ 


 100 mls/hr  Q24H  4/25/20 13:00


    4/28/20 14:32


100 MLS/HR


 


 Daptomycin 500 mg/


 Sodium Chloride  50 ml @ 


 100 mls/hr  Q48H  3/25/20 08:30


 4/10/20 10:07 DC 4/10/20 09:57


100 MLS/HR


 


 Dexamethasone


 Sodium Phosphate


  (Decadron)  4 mg  STK-MED ONCE  4/27/20 10:56


 4/27/20 10:57 DC  





 


 Dexmedetomidine


 HCl 400 mcg/


 Sodium Chloride  100 ml @ 0


 mls/hr  CONT  PRN  4/2/20 08:15


    4/29/20 09:22


33 MLS/HR


 


 Dextrose


  (Dextrose


 50%-Water Syringe)  12.5 gm  PRN Q15MIN  PRN  3/16/20 09:30


     





 


 Digoxin


  (Lanoxin)  125 mcg  1X  ONCE  3/19/20 18:00


 3/19/20 18:01 DC 3/19/20 17:10


125 MCG


 


 Diphenhydramine


 HCl


  (Benadryl)  25 mg  1X PRN  PRN  4/24/20 15:45


 4/25/20 15:44 DC  





 


 Enoxaparin Sodium


  (Lovenox 100mg


 Syringe)  100 mg  Q12HR  4/21/20 21:00


   UNV  





 


 Etomidate


  (Amidate)  8 mg  1X  ONCE  3/23/20 08:30


 3/23/20 08:31 DC 3/23/20 08:33


8 MG


 


 Fentanyl Citrate


  (Fentanyl 2ml


 Vial)  100 mcg  STK-MED ONCE  4/27/20 10:56


 4/27/20 10:57 DC  





 


 Furosemide


  (Lasix)  40 mg  1X  ONCE  4/13/20 14:30


 4/13/20 14:31 DC 4/13/20 14:39


40 MG


 


 Heparin Sodium


  (Porcine)


  (Hep Lock Adult)  500 unit  STK-MED ONCE  4/7/20 09:29


 4/7/20 09:30 DC  





 


 Heparin Sodium


  (Porcine)


  (Heparin Sodium)  5,000 unit  Q12HR  4/27/20 21:00


    4/29/20 08:44


5,000 UNIT


 


 Heparin Sodium


  (Porcine) 1000


 unit/Sodium


 Chloride  1,001 ml @ 


 1,001 mls/hr  1X  ONCE  4/27/20 12:00


 4/27/20 12:59 DC  





 


 Hydromorphone HCl


  (Dilaudid


 Standard PCA)  12 mg  STK-MED ONCE  4/28/20 12:05


 4/29/20 09:15 DC  





 


 Hydromorphone HCl


  (Dilaudid)  1 mg  PRN Q3HRS  PRN  3/17/20 12:00


 3/31/20 00:25 DC 3/23/20 05:13


1 MG


 


 Info


  (CONTRAST GIVEN


 -- Rx MONITORING)  1 each  PRN DAILY  PRN  3/30/20 11:45


 4/1/20 11:44 DC  





 


 Info


  (Icu Electrolyte


 Protocol)  1 ea  CONT PRN  PRN  3/29/20 13:15


     





 


 Info


  (PHARMACY


 MONITORING -- do


 not chart)  1 each  PRN DAILY  PRN  4/24/20 15:45


     





 


 Info


  (Tpn Per


 Pharmacy)  1 each  PRN DAILY  PRN  3/18/20 12:30


   UNV  





 


 Insulin Human


 Lispro


  (HumaLOG)  0-9 UNITS  Q6HRS  3/16/20 09:30


    4/28/20 08:42


4 UNITS


 


 Insulin Human


 Regular


  (HumuLIN R VIAL)  5 unit  1X  ONCE  3/17/20 22:30


 3/17/20 22:31 DC 3/17/20 22:14


5 UNIT


 


 Iohexol


  (Omnipaque 240


 Mg/ml)  30 ml  1X  ONCE  3/30/20 11:30


 3/30/20 11:33 DC 3/30/20 11:30


30 ML


 


 Iohexol


  (Omnipaque 300


 Mg/ml)  50 ml  STK-MED ONCE  4/27/20 10:58


 4/27/20 10:59 DC  





 


 Iohexol


  (Omnipaque 350


 Mg/ml)  90 ml  1X  ONCE  3/16/20 03:30


 3/16/20 03:31 DC 3/16/20 03:25


90 ML


 


 Ketorolac


 Tromethamine


  (Toradol 30mg


 Vial)  30 mg  1X  ONCE  3/16/20 03:00


 3/16/20 03:01 DC 3/16/20 02:54


30 MG


 


 Lidocaine HCl


  (Buffered


 Lidocaine 1%)  6 ml  1X  ONCE  4/15/20 13:30


 4/15/20 13:31 DC 4/15/20 13:45


9 ML


 


 Lidocaine HCl


  (Glydo


  (Lidocaine) Jelly)  1 thomas  1X  ONCE  3/20/20 14:30


 3/20/20 14:31 DC 3/20/20 16:38


1 THOMAS


 


 Lidocaine HCl


  (Xylocaine-Mpf


 1% 2ml Vial)  2 ml  PRN 1X  PRN  4/27/20 07:00


 4/28/20 06:59 DC  





 


 Linezolid/Dextrose  300 ml @ 


 300 mls/hr  Q12HR  4/10/20 11:00


 4/21/20 08:10 DC 4/20/20 20:40


300 MLS/HR


 


 Lorazepam


  (Ativan Inj)  1 mg  PRN Q4HRS  PRN  3/19/20 09:00


 4/17/20 09:19 DC 4/17/20 03:51


1 MG


 


 Magnesium Sulfate  50 ml @ 25


 mls/hr  PRN DAILY  PRN  4/5/20 09:15


    4/20/20 17:27


25 MLS/HR


 


 Meropenem 1 gm/


 Sodium Chloride  100 ml @ 


 200 mls/hr  Q8HRS  4/28/20 14:00


    4/29/20 05:23


200 MLS/HR


 


 Meropenem 500 mg/


 Sodium Chloride  50 ml @ 


 100 mls/hr  Q12H  4/8/20 10:00


 4/28/20 12:37 DC 4/28/20 10:45


100 MLS/HR


 


 Metoprolol


 Tartrate


  (Lopressor Vial)  5 mg  Q6HRS  3/17/20 10:15


 3/28/20 08:48 DC 3/26/20 00:12


5 MG


 


 Metronidazole  100 ml @ 


 100 mls/hr  Q8HRS  4/14/20 10:00


 4/21/20 08:10 DC 4/21/20 06:04


100 MLS/HR


 


 Micafungin Sodium


 100 mg/Dextrose  100 ml @ 


 100 mls/hr  Q24H  3/23/20 09:00


    4/29/20 08:43


100 MLS/HR


 


 Midazolam HCl


  (Versed)  5 mg  1X  ONCE  3/23/20 08:30


 3/23/20 08:31 DC  





 


 Midazolam HCl 100


 mg/Sodium Chloride  100 ml @ 7


 mls/hr  CONT  PRN  3/28/20 16:00


    4/8/20 15:35


7 MLS/HR


 


 Midazolam HCl 50


 mg/Sodium Chloride  50 ml @ 0


 mls/hr  CONT  PRN  3/23/20 08:15


 3/28/20 15:59 DC 3/26/20 22:39


7 MLS/HR


 


 Morphine Sulfate


  (Morphine


 Sulfate)  2 mg  PRN Q2HR  PRN  3/16/20 05:00


 3/17/20 14:15 DC 3/17/20 12:26


2 MG


 


 Multi-Ingred


 Cream/Lotion/Oil/


 Oint


  (Artificial


 Tears Eye


 Ointment)  1 thomas  PRN Q1HR  PRN  3/25/20 17:30


    4/13/20 08:19


1 THOMAS


 


 Naloxone HCl


  (Narcan)  0.4 mg  PRN Q2MIN  PRN  4/27/20 14:30


   UNV  





 


 Norepinephrine


 Bitartrate 8 mg/


 Dextrose  258 ml @ 


 17.299 mls/


 hr  CONT  PRN  3/17/20 15:30


 4/17/20 09:19 DC 4/14/20 12:48


20.9 MLS/HR


 


 Ondansetron HCl


  (Zofran)  4 mg  STK-MED ONCE  4/27/20 10:56


 4/27/20 10:57 DC  





 


 Pantoprazole


 Sodium


  (PROTONIX VIAL


 for IV PUSH)  40 mg  DAILYAC  3/16/20 11:30


    4/29/20 07:39


40 MG


 


 Phenylephrine HCl


  (PHENYLEPHRINE


 in 0.9% NACL PF)  1 mg  STK-MED ONCE  4/27/20 12:34


 4/27/20 12:34 DC  





 


 Piperacillin Sod/


 Tazobactam Sod


 4.5 gm/Sodium


 Chloride  100 ml @ 


 200 mls/hr  1X  ONCE  3/16/20 06:00


 3/16/20 06:29 DC 3/16/20 05:44


200 MLS/HR


 


 Potassium


 Chloride 15 meq/


 Bicarbonate


 Dialysis Soln w/


 out KCl  5,007.5 ml


  @ 1,000 mls/


 hr  Q5H1M  3/29/20 20:00


 4/2/20 13:08 DC 4/1/20 18:14


1,000 MLS/HR


 


 Potassium


 Chloride 20 meq/


 Bicarbonate


 Dialysis Soln w/


 out KCl  5,010 ml @ 


 1,000 mls/hr  Q5H1M  3/25/20 16:00


 3/29/20 19:59 DC 3/29/20 14:54


1,000 MLS/HR


 


 Potassium


 Chloride/Water  100 ml @ 


 100 mls/hr  1X  ONCE  4/18/20 14:45


 4/18/20 15:44 DC 4/18/20 17:28


100 MLS/HR


 


 Potassium


 Phosphate 20 mmol/


 Sodium Chloride  106.6667


 ml @ 


 51.667 m...  1X  ONCE  3/25/20 13:00


 3/25/20 15:03 DC 3/25/20 12:51


51.667 MLS/HR


 


 Potassium Acetate


 55 meq/Magnesium


 Sulfate 20 meq/


 Calcium Gluconate


 10 meq/


 Multivitamins 10


 ml/Chromium/


 Copper/Manganese/


 Seleni/Zn 0.5 ml/


 Insulin Human


 Regular 35 unit/


 Total Parenteral


 Nutrition/Amino


 Acids/Dextrose/


 Fat Emulsion


 Intravenous  1,920 ml @ 


 80 mls/hr  TPN  CONT  4/28/20 22:00


 4/29/20 21:59  4/28/20 22:02


80 MLS/HR


 


 Prochlorperazine


 Edisylate


  (Compazine)  5 mg  PACU PRN  PRN  4/27/20 07:00


 4/28/20 06:59 DC  





 


 Propofol  20 ml @ As


 Directed  STK-MED ONCE  4/27/20 12:26


 4/27/20 12:27 DC  





 


 Ringer's Solution  1,000 ml @ 


 30 mls/hr  Q24H  4/27/20 07:00


 4/27/20 18:59 DC  





 


 Rocuronium Bromide


  (Zemuron)  50 mg  STK-MED ONCE  4/27/20 10:56


 4/27/20 10:57 DC  





 


 Sevoflurane


  (Ultane)  60 ml  STK-MED ONCE  4/27/20 12:26


 4/27/20 12:27 DC  





 


 Sodium


 Bicarbonate 50


 meq/Sodium


 Chloride  1,050 ml @ 


 75 mls/hr  Q14H  3/18/20 07:30


 3/23/20 10:28 DC 3/22/20 21:10


75 MLS/HR


 


 Sodium Acetate 50


 meq/Potassium


 Acetate 55 meq/


 Magnesium Sulfate


 20 meq/Calcium


 Gluconate 10 meq/


 Multivitamins 10


 ml/Chromium/


 Copper/Manganese/


 Seleni/Zn 0.5 ml/


 Insulin Human


 Regular 35 unit/


 Total Parenteral


 Nutrition/Amino


 Acids/Dextrose/


 Fat Emulsion


 Intravenous  1,800 ml @ 


 75 mls/hr  TPN  CONT  4/25/20 22:00


 4/26/20 21:59 DC 4/25/20 22:03


75 MLS/HR


 


 Sodium Chloride  1,000 ml @ 


 25 mls/hr  Q24H  4/27/20 14:30


   UNV  





 


 Sodium Chloride


  (Normal Saline


 Flush)  3 ml  QSHIFT  PRN  4/27/20 13:45


     





 


 Sodium Chloride


 90 meq/Calcium


 Gluconate 10 meq/


 Multivitamins 10


 ml/Chromium/


 Copper/Manganese/


 Seleni/Zn 0.5 ml/


 Total Parenteral


 Nutrition/Amino


 Acids/Dextrose/


 Fat Emulsion


 Intravenous  1,512 ml @ 


 63 mls/hr  TPN  CONT  3/18/20 22:00


 3/19/20 21:59 DC 3/18/20 22:06


63 MLS/HR


 


 Sodium Chloride


 90 meq/Calcium


 Gluconate 10 meq/


 Multivitamins 10


 ml/Chromium/


 Copper/Manganese/


 Seleni/Zn 1 ml/


 Total Parenteral


 Nutrition/Amino


 Acids/Dextrose/


 Fat Emulsion


 Intravenous  55.005 ml


  @ 2.292


 mls/hr  TPN  CONT  3/18/20 22:00


 3/18/20 12:33 DC  





 


 Sodium Chloride


 90 meq/Magnesium


 Sulfate 10 meq/


 Calcium Gluconate


 20 meq/


 Multivitamins 10


 ml/Chromium/


 Copper/Manganese/


 Seleni/Zn 0.5 ml/


 Total Parenteral


 Nutrition/Amino


 Acids/Dextrose/


 Fat Emulsion


 Intravenous  1,512 ml @ 


 63 mls/hr  TPN  CONT  3/19/20 22:00


 3/20/20 21:59 DC 3/19/20 22:25


63 MLS/HR


 


 Sodium Chloride


 90 meq/Magnesium


 Sulfate 12 meq/


 Calcium Gluconate


 15 meq/


 Multivitamins 10


 ml/Chromium/


 Copper/Manganese/


 Seleni/Zn 0.5 ml/


 Insulin Human


 Regular 25 unit/


 Total Parenteral


 Nutrition/Amino


 Acids/Dextrose/


 Fat Emulsion


 Intravenous  1,400 ml @ 


 58.333 mls/


 hr  TPN  CONT  4/8/20 22:00


 4/9/20 21:59 DC 4/8/20 21:41


58.333 MLS/HR


 


 Sodium Chloride


 90 meq/Potassium


 Chloride 15 meq/


 Magnesium Sulfate


 12 meq/Calcium


 Gluconate 15 meq/


 Multivitamins 10


 ml/Chromium/


 Copper/Manganese/


 Seleni/Zn 0.5 ml/


 Insulin Human


 Regular 25 unit/


 Total Parenteral


 Nutrition/Amino


 Acids/Dextrose/


 Fat Emulsion


 Intravenous  1,400 ml @ 


 58.333 mls/


 hr  TPN  CONT  4/7/20 22:00


 4/8/20 21:59 DC 4/7/20 22:13


58.333 MLS/HR


 


 Sodium Chloride


 90 meq/Potassium


 Chloride 15 meq/


 Potassium


 Phosphate 10 mmol/


 Magnesium Sulfate


 8 meq/Calcium


 Gluconate 15 meq/


 Multivitamins 10


 ml/Chromium/


 Copper/Manganese/


 Seleni/Zn 0.5 ml/


 Insulin Human


 Regular 25 unit/


 Total Parenteral


 Nutrition/Amino


 Acids/Dextrose/


 Fat Emulsion


 Intravenous  1,400 ml @ 


 58.333 mls/


 hr  TPN  CONT  4/5/20 22:00


 4/6/20 21:59 DC 4/5/20 21:20


58.333 MLS/HR


 


 Sodium Chloride


 90 meq/Potassium


 Chloride 15 meq/


 Potassium


 Phosphate 10 mmol/


 Magnesium Sulfate


 10 meq/Calcium


 Gluconate 20 meq/


 Multivitamins 10


 ml/Chromium/


 Copper/Manganese/


 Seleni/Zn 0.5 ml/


 Total Parenteral


 Nutrition/Amino


 Acids/Dextrose/


 Fat Emulsion


 Intravenous  1,400 ml @ 


 58.333 mls/


 hr  TPN  CONT  3/23/20 22:00


 3/24/20 21:59 DC 3/23/20 21:42


58.333 MLS/HR


 


 Sodium Chloride


 90 meq/Potassium


 Chloride 15 meq/


 Potassium


 Phosphate 10 mmol/


 Magnesium Sulfate


 12 meq/Calcium


 Gluconate 15 meq/


 Multivitamins 10


 ml/Chromium/


 Copper/Manganese/


 Seleni/Zn 0.5 ml/


 Insulin Human


 Regular 25 unit/


 Total Parenteral


 Nutrition/Amino


 Acids/Dextrose/


 Fat Emulsion


 Intravenous  1,400 ml @ 


 58.333 mls/


 hr  TPN  CONT  4/6/20 22:00


 4/7/20 21:59 DC 4/6/20 22:24


58.333 MLS/HR


 


 Sodium Chloride


 90 meq/Potassium


 Chloride 15 meq/


 Potassium


 Phosphate 15 mmol/


 Magnesium Sulfate


 10 meq/Calcium


 Gluconate 15 meq/


 Multivitamins 10


 ml/Chromium/


 Copper/Manganese/


 Seleni/Zn 0.5 ml/


 Total Parenteral


 Nutrition/Amino


 Acids/Dextrose/


 Fat Emulsion


 Intravenous  1,400 ml @ 


 58.333 mls/


 hr  TPN  CONT  3/24/20 22:00


 3/25/20 21:59 DC 3/24/20 22:17


58.333 MLS/HR


 


 Sodium Chloride


 90 meq/Potassium


 Chloride 15 meq/


 Potassium


 Phosphate 15 mmol/


 Magnesium Sulfate


 10 meq/Calcium


 Gluconate 20 meq/


 Multivitamins 10


 ml/Chromium/


 Copper/Manganese/


 Seleni/Zn 0.5 ml/


 Total Parenteral


 Nutrition/Amino


 Acids/Dextrose/


 Fat Emulsion


 Intravenous  1,200 ml @ 


 50 mls/hr  TPN  CONT  3/22/20 22:00


 3/22/20 14:17 DC  





 


 Sodium Chloride


 90 meq/Potassium


 Chloride 15 meq/


 Potassium


 Phosphate 18 mmol/


 Magnesium Sulfate


 8 meq/Calcium


 Gluconate 15 meq/


 Multivitamins 10


 ml/Chromium/


 Copper/Manganese/


 Seleni/Zn 0.5 ml/


 Insulin Human


 Regular 10 unit/


 Total Parenteral


 Nutrition/Amino


 Acids/Dextrose/


 Fat Emulsion


 Intravenous  1,400 ml @ 


 58.333 mls/


 hr  TPN  CONT  3/27/20 22:00


 3/28/20 21:59 DC 3/27/20 21:43


58.333 MLS/HR


 


 Sodium Chloride


 90 meq/Potassium


 Chloride 15 meq/


 Potassium


 Phosphate 18 mmol/


 Magnesium Sulfate


 8 meq/Calcium


 Gluconate 15 meq/


 Multivitamins 10


 ml/Chromium/


 Copper/Manganese/


 Seleni/Zn 0.5 ml/


 Insulin Human


 Regular 15 unit/


 Total Parenteral


 Nutrition/Amino


 Acids/Dextrose/


 Fat Emulsion


 Intravenous  1,400 ml @ 


 58.333 mls/


 hr  TPN  CONT  3/30/20 22:00


 3/31/20 21:59 DC 3/30/20 21:47


58.333 MLS/HR


 


 Sodium Chloride


 90 meq/Potassium


 Chloride 15 meq/


 Potassium


 Phosphate 18 mmol/


 Magnesium Sulfate


 8 meq/Calcium


 Gluconate 15 meq/


 Multivitamins 10


 ml/Chromium/


 Copper/Manganese/


 Seleni/Zn 0.5 ml/


 Insulin Human


 Regular 20 unit/


 Total Parenteral


 Nutrition/Amino


 Acids/Dextrose/


 Fat Emulsion


 Intravenous  1,400 ml @ 


 58.333 mls/


 hr  TPN  CONT  4/2/20 22:00


 4/3/20 21:59 DC 4/2/20 22:45


58.333 MLS/HR


 


 Sodium Chloride


 90 meq/Potassium


 Chloride 15 meq/


 Potassium


 Phosphate 18 mmol/


 Magnesium Sulfate


 8 meq/Calcium


 Gluconate 15 meq/


 Multivitamins 10


 ml/Chromium/


 Copper/Manganese/


 Seleni/Zn 0.5 ml/


 Total Parenteral


 Nutrition/Amino


 Acids/Dextrose/


 Fat Emulsion


 Intravenous  1,400 ml @ 


 58.333 mls/


 hr  TPN  CONT  3/26/20 22:00


 3/27/20 21:59 DC 3/26/20 22:00


58.333 MLS/HR


 


 Sodium Chloride


 90 meq/Potassium


 Phosphate 15 mmol/


 Magnesium Sulfate


 12 meq/Calcium


 Gluconate 15 meq/


 Multivitamins 10


 ml/Chromium/


 Copper/Manganese/


 Seleni/Zn 0.5 ml/


 Insulin Human


 Regular 30 unit/


 Total Parenteral


 Nutrition/Amino


 Acids/Dextrose/


 Fat Emulsion


 Intravenous  1,400 ml @ 


 58.333 mls/


 hr  TPN  CONT  4/10/20 22:00


 4/11/20 21:59 DC 4/10/20 21:49


58.333 MLS/HR


 


 Sodium Chloride


 90 meq/Potassium


 Phosphate 15 mmol/


 Magnesium Sulfate


 12 meq/Calcium


 Gluconate 15 meq/


 Multivitamins 10


 ml/Chromium/


 Copper/Manganese/


 Seleni/Zn 0.5 ml/


 Insulin Human


 Regular 40 unit/


 Total Parenteral


 Nutrition/Amino


 Acids/Dextrose/


 Fat Emulsion


 Intravenous  1,400 ml @ 


 58.333 mls/


 hr  TPN  CONT  4/11/20 22:00


 4/12/20 21:59 DC 4/11/20 21:21


58.333 MLS/HR


 


 Sodium Chloride


 90 meq/Potassium


 Phosphate 19 mmol/


 Magnesium Sulfate


 12 meq/Calcium


 Gluconate 15 meq/


 Multivitamins 10


 ml/Chromium/


 Copper/Manganese/


 Seleni/Zn 0.5 ml/


 Insulin Human


 Regular 40 unit/


 Total Parenteral


 Nutrition/Amino


 Acids/Dextrose/


 Fat Emulsion


 Intravenous  1,400 ml @ 


 58.333 mls/


 hr  TPN  CONT  4/12/20 22:00


 4/13/20 21:59 DC 4/12/20 21:54


58.333 MLS/HR


 


 Sodium Chloride


 90 meq/Potassium


 Phosphate 5 mmol/


 Magnesium Sulfate


 12 meq/Calcium


 Gluconate 15 meq/


 Multivitamins 10


 ml/Chromium/


 Copper/Manganese/


 Seleni/Zn 0.5 ml/


 Insulin Human


 Regular 30 unit/


 Total Parenteral


 Nutrition/Amino


 Acids/Dextrose/


 Fat Emulsion


 Intravenous  1,400 ml @ 


 58.333 mls/


 hr  TPN  CONT  4/9/20 22:00


 4/10/20 21:59 DC 4/9/20 22:08


58.333 MLS/HR


 


 Sodium Chloride


 100 meq/Potassium


 Chloride 40 meq/


 Magnesium Sulfate


 15 meq/Calcium


 Gluconate 15 meq/


 Multivitamins 10


 ml/Chromium/


 Copper/Manganese/


 Seleni/Zn 0.5 ml/


 Insulin Human


 Regular 35 unit/


 Total Parenteral


 Nutrition/Amino


 Acids/Dextrose/


 Fat Emulsion


 Intravenous  1,400 ml @ 


 58.333 mls/


 hr  TPN  CONT  4/19/20 22:00


 4/20/20 21:59 DC 4/19/20 22:46


58.333 MLS/HR


 


 Sodium Chloride


 100 meq/Potassium


 Chloride 40 meq/


 Magnesium Sulfate


 20 meq/Calcium


 Gluconate 10 meq/


 Multivitamins 10


 ml/Chromium/


 Copper/Manganese/


 Seleni/Zn 0.5 ml/


 Insulin Human


 Regular 35 unit/


 Total Parenteral


 Nutrition/Amino


 Acids/Dextrose/


 Fat Emulsion


 Intravenous  1,400 ml @ 


 58.333 mls/


 hr  TPN  CONT  4/23/20 22:00


 4/24/20 21:59 DC 4/24/20 00:06


58.333 MLS/HR


 


 Sodium Chloride


 100 meq/Potassium


 Chloride 40 meq/


 Magnesium Sulfate


 20 meq/Calcium


 Gluconate 15 meq/


 Multivitamins 10


 ml/Chromium/


 Copper/Manganese/


 Seleni/Zn 0.5 ml/


 Insulin Human


 Regular 35 unit/


 Total Parenteral


 Nutrition/Amino


 Acids/Dextrose/


 Fat Emulsion


 Intravenous  1,400 ml @ 


 58.333 mls/


 hr  TPN  CONT  4/22/20 22:00


 4/23/20 21:59 DC 4/22/20 22:27


58.333 MLS/HR


 


 Sodium Chloride


 100 meq/Potassium


 Phosphate 10 mmol/


 Magnesium Sulfate


 12 meq/Calcium


 Gluconate 15 meq/


 Multivitamins 10


 ml/Chromium/


 Copper/Manganese/


 Seleni/Zn 0.5 ml/


 Insulin Human


 Regular 35 unit/


 Potassium


 Chloride 20 meq/


 Total Parenteral


 Nutrition/Amino


 Acids/Dextrose/


 Fat Emulsion


 Intravenous  1,400 ml @ 


 58.333 mls/


 hr  TPN  CONT  4/16/20 22:00


 4/17/20 21:59 DC 4/16/20 22:10


58.333 MLS/HR


 


 Sodium Chloride


 100 meq/Potassium


 Phosphate 19 mmol/


 Magnesium Sulfate


 12 meq/Calcium


 Gluconate 15 meq/


 Multivitamins 10


 ml/Chromium/


 Copper/Manganese/


 Seleni/Zn 0.5 ml/


 Insulin Human


 Regular 40 unit/


 Potassium


 Chloride 20 meq/


 Total Parenteral


 Nutrition/Amino


 Acids/Dextrose/


 Fat Emulsion


 Intravenous  1,400 ml @ 


 58.333 mls/


 hr  TPN  CONT  4/15/20 22:00


 4/16/20 21:59 DC 4/15/20 21:20


58.333 MLS/HR


 


 Sodium Chloride


 100 meq/Potassium


 Phosphate 5 mmol/


 Magnesium Sulfate


 12 meq/Calcium


 Gluconate 15 meq/


 Multivitamins 10


 ml/Chromium/


 Copper/Manganese/


 Seleni/Zn 0.5 ml/


 Insulin Human


 Regular 35 unit/


 Potassium


 Chloride 20 meq/


 Total Parenteral


 Nutrition/Amino


 Acids/Dextrose/


 Fat Emulsion


 Intravenous  1,400 ml @ 


 58.333 mls/


 hr  TPN  CONT  4/17/20 22:00


 4/18/20 21:59 DC 4/17/20 22:59


58.333 MLS/HR


 


 Succinylcholine


 Chloride


  (Anectine)  120 mg  1X  ONCE  3/23/20 08:30


 3/23/20 08:31 DC 3/23/20 08:34


120 MG











Labs:


Lab





Laboratory Tests








Test


 4/28/20


12:08 4/28/20


18:44 4/28/20


23:40 4/29/20


06:10


 


Glucose (Fingerstick)


 148 mg/dL


(70-99) 116 mg/dL


(70-99) 94 mg/dL


(70-99) 





 


White Blood Count


 


 


 


 10.4 x10^3/uL


(4.0-11.0)


 


Red Blood Count


 


 


 


 2.51 x10^6/uL


(3.50-5.40)


 


Hemoglobin


 


 


 


 7.4 g/dL


(12.0-15.5)


 


Hematocrit


 


 


 


 22.9 %


(36.0-47.0)


 


Mean Corpuscular Volume    91 fL () 


 


Mean Corpuscular Hemoglobin    29 pg (25-35) 


 


Mean Corpuscular Hemoglobin


Concent 


 


 


 32 g/dL


(31-37)


 


Red Cell Distribution Width


 


 


 


 18.4 %


(11.5-14.5)


 


Platelet Count


 


 


 


 439 x10^3/uL


(140-400)


 


Sodium Level


 


 


 


 151 mmol/L


(136-145)


 


Potassium Level


 


 


 


 3.6 mmol/L


(3.5-5.1)


 


Chloride Level


 


 


 


 114 mmol/L


()


 


Carbon Dioxide Level


 


 


 


 29 mmol/L


(21-32)


 


Anion Gap    8 (6-14) 


 


Blood Urea Nitrogen


 


 


 


 58 mg/dL


(7-20)


 


Creatinine


 


 


 


 1.0 mg/dL


(0.6-1.0)


 


Estimated GFR


(Cockcroft-Gault) 


 


 


 58.9 





 


Glucose Level


 


 


 


 104 mg/dL


(70-99)


 


Calcium Level


 


 


 


 8.2 mg/dL


(8.5-10.1)


 


Test


 4/29/20


06:14 4/29/20


08:00 


 





 


Glucose (Fingerstick)


 87 mg/dL


(70-99) 


 


 





 


White Blood Count


 


 14.5 x10^3/uL


(4.0-11.0) 


 





 


Red Blood Count


 


 2.89 x10^6/uL


(3.50-5.40) 


 





 


Hemoglobin


 


 8.5 g/dL


(12.0-15.5) 


 





 


Hematocrit


 


 26.9 %


(36.0-47.0) 


 





 


Mean Corpuscular Volume  93 fL ()   


 


Mean Corpuscular Hemoglobin  29 pg (25-35)   


 


Mean Corpuscular Hemoglobin


Concent 


 32 g/dL


(31-37) 


 





 


Red Cell Distribution Width


 


 18.5 %


(11.5-14.5) 


 





 


Platelet Count


 


 446 x10^3/uL


(140-400) 


 














Objective:


Assessment:


Fever resolving


Acute pancreatitis with persistent  necrosis


CT a/p 4/9


    Increased ascites. Persistent evidence of necrotizing pancreatitis with 

fluid and phlegmon


   at the pancreas


   4/27 status post ROBERT drain placement; 


Cholelithiasis with thickening of the gallbladder wall.


Leucocytosis improving


JED,Hyperkalemia, Metabolic acidosis off dialysis


Acute hypoxic resp failure ,bilateral pleural effusion and atelectasis


hypocalcemia 


Prediabetes


HTN


s/p trach





Plan:


Plan of Care


cont merrem (4/8), micafungin, daptomycin


 (zyvox changed to dapto (4/22), to rule out serotonin sy causing fever)


Bilateral upper extremity Doppler ultrasound to rule out DVT


If negative for DVT ,DC central line and place PICC line


Monitor for abx toxicities. 


Maintain aspiration precautions 


PT and OT as tolerated





D/w nursing





Critically ill











IVAN FRANZ MD           Apr 29, 2020 11:39

## 2020-04-29 NOTE — RAD
INDICATION: Bilateral arm swelling

 

COMPARISON: None.

 

TECHNIQUE: Grayscale, color and doppler ultrasound images were obtained of

the bilateral upper extremity venous vasculature.

 

 

No thrombus identified in the internal jugular, subclavian, axillary, 

brachial, basilic, cephalic, radial or ulnar veins.

Edema of soft tissues.

 

IMPRESSION:

 

1. No thrombus identified in deep venous system of bilateral upper 

extremity.

 

Electronically signed by: Keegan Stovall MD (4/29/2020 1:41 PM) [id number]

## 2020-04-29 NOTE — NUR
This AM, MIRACLE Davis ordered a Bilateral UE US to rule out any infection/DVT. This RN notified 
Dr. Davis that the central line is due to be changed (15 days). Dr. Davis said that if the 
US comes back negative then we can put a PICC line in her. 



Dr. Gordillo rounded around noon and this RN asked about a PICC line. Dr. Gordillo agreed to us 
placing a PICC line. Dr. Gordillo also ordered blood culture x2 due b/c pt spiked fever again at 
noon 100.0. Still awaiting for pt to receive US. US notified.

## 2020-04-30 VITALS — DIASTOLIC BLOOD PRESSURE: 64 MMHG | SYSTOLIC BLOOD PRESSURE: 100 MMHG

## 2020-04-30 VITALS — DIASTOLIC BLOOD PRESSURE: 70 MMHG | SYSTOLIC BLOOD PRESSURE: 129 MMHG

## 2020-04-30 VITALS — SYSTOLIC BLOOD PRESSURE: 91 MMHG | DIASTOLIC BLOOD PRESSURE: 59 MMHG

## 2020-04-30 VITALS — DIASTOLIC BLOOD PRESSURE: 71 MMHG | SYSTOLIC BLOOD PRESSURE: 138 MMHG

## 2020-04-30 VITALS — DIASTOLIC BLOOD PRESSURE: 77 MMHG | SYSTOLIC BLOOD PRESSURE: 158 MMHG

## 2020-04-30 VITALS — SYSTOLIC BLOOD PRESSURE: 139 MMHG | DIASTOLIC BLOOD PRESSURE: 64 MMHG

## 2020-04-30 VITALS — SYSTOLIC BLOOD PRESSURE: 105 MMHG | DIASTOLIC BLOOD PRESSURE: 55 MMHG

## 2020-04-30 VITALS — DIASTOLIC BLOOD PRESSURE: 52 MMHG | SYSTOLIC BLOOD PRESSURE: 100 MMHG

## 2020-04-30 VITALS — SYSTOLIC BLOOD PRESSURE: 125 MMHG | DIASTOLIC BLOOD PRESSURE: 66 MMHG

## 2020-04-30 VITALS — DIASTOLIC BLOOD PRESSURE: 88 MMHG | SYSTOLIC BLOOD PRESSURE: 171 MMHG

## 2020-04-30 VITALS — DIASTOLIC BLOOD PRESSURE: 49 MMHG | SYSTOLIC BLOOD PRESSURE: 87 MMHG

## 2020-04-30 VITALS — DIASTOLIC BLOOD PRESSURE: 76 MMHG | SYSTOLIC BLOOD PRESSURE: 154 MMHG

## 2020-04-30 VITALS — SYSTOLIC BLOOD PRESSURE: 134 MMHG | DIASTOLIC BLOOD PRESSURE: 67 MMHG

## 2020-04-30 VITALS — DIASTOLIC BLOOD PRESSURE: 56 MMHG | SYSTOLIC BLOOD PRESSURE: 99 MMHG

## 2020-04-30 VITALS — SYSTOLIC BLOOD PRESSURE: 150 MMHG | DIASTOLIC BLOOD PRESSURE: 82 MMHG

## 2020-04-30 VITALS — DIASTOLIC BLOOD PRESSURE: 63 MMHG | SYSTOLIC BLOOD PRESSURE: 152 MMHG

## 2020-04-30 VITALS — DIASTOLIC BLOOD PRESSURE: 42 MMHG | SYSTOLIC BLOOD PRESSURE: 89 MMHG

## 2020-04-30 VITALS — DIASTOLIC BLOOD PRESSURE: 57 MMHG | SYSTOLIC BLOOD PRESSURE: 110 MMHG

## 2020-04-30 VITALS — DIASTOLIC BLOOD PRESSURE: 62 MMHG | SYSTOLIC BLOOD PRESSURE: 102 MMHG

## 2020-04-30 VITALS — SYSTOLIC BLOOD PRESSURE: 99 MMHG | DIASTOLIC BLOOD PRESSURE: 54 MMHG

## 2020-04-30 VITALS — SYSTOLIC BLOOD PRESSURE: 108 MMHG | DIASTOLIC BLOOD PRESSURE: 61 MMHG

## 2020-04-30 VITALS — DIASTOLIC BLOOD PRESSURE: 47 MMHG | SYSTOLIC BLOOD PRESSURE: 98 MMHG

## 2020-04-30 VITALS — DIASTOLIC BLOOD PRESSURE: 89 MMHG | SYSTOLIC BLOOD PRESSURE: 169 MMHG

## 2020-04-30 LAB
ANION GAP SERPL CALC-SCNC: 6 MMOL/L (ref 6–14)
BUN SERPL-MCNC: 51 MG/DL (ref 7–20)
CALCIUM SERPL-MCNC: 8.5 MG/DL (ref 8.5–10.1)
CHLORIDE SERPL-SCNC: 113 MMOL/L (ref 98–107)
CO2 SERPL-SCNC: 33 MMOL/L (ref 21–32)
CREAT SERPL-MCNC: 1.1 MG/DL (ref 0.6–1)
ERYTHROCYTE [DISTWIDTH] IN BLOOD BY AUTOMATED COUNT: 18.6 % (ref 11.5–14.5)
GFR SERPLBLD BASED ON 1.73 SQ M-ARVRAT: 52.8 ML/MIN
GLUCOSE SERPL-MCNC: 119 MG/DL (ref 70–99)
HCT VFR BLD CALC: 24.5 % (ref 36–47)
HGB BLD-MCNC: 7.7 G/DL (ref 12–15.5)
MCH RBC QN AUTO: 29 PG (ref 25–35)
MCHC RBC AUTO-ENTMCNC: 32 G/DL (ref 31–37)
MCV RBC AUTO: 92 FL (ref 79–100)
PLATELET # BLD AUTO: 411 X10^3/UL (ref 140–400)
POTASSIUM SERPL-SCNC: 3.6 MMOL/L (ref 3.5–5.1)
RBC # BLD AUTO: 2.66 X10^6/UL (ref 3.5–5.4)
SODIUM SERPL-SCNC: 152 MMOL/L (ref 136–145)
WBC # BLD AUTO: 11.3 X10^3/UL (ref 4–11)

## 2020-04-30 RX ADMIN — INSULIN LISPRO SCH UNITS: 100 INJECTION, SOLUTION INTRAVENOUS; SUBCUTANEOUS at 12:00

## 2020-04-30 RX ADMIN — INSULIN LISPRO SCH UNITS: 100 INJECTION, SOLUTION INTRAVENOUS; SUBCUTANEOUS at 00:00

## 2020-04-30 RX ADMIN — BACITRACIN SCH MLS/HR: 5000 INJECTION, POWDER, FOR SOLUTION INTRAMUSCULAR at 01:06

## 2020-04-30 RX ADMIN — HEPARIN SODIUM SCH UNIT: 5000 INJECTION, SOLUTION INTRAVENOUS; SUBCUTANEOUS at 08:20

## 2020-04-30 RX ADMIN — DEXMEDETOMIDINE HYDROCHLORIDE PRN MLS/HR: 100 INJECTION, SOLUTION, CONCENTRATE INTRAVENOUS at 08:19

## 2020-04-30 RX ADMIN — Medication PRN MLS/HR: at 17:20

## 2020-04-30 RX ADMIN — DEXMEDETOMIDINE HYDROCHLORIDE PRN MLS/HR: 100 INJECTION, SOLUTION, CONCENTRATE INTRAVENOUS at 01:07

## 2020-04-30 RX ADMIN — DEXTRAN 70, GLYCERIN, HYPROMELLOSE PRN DROP: 1; 2; 3 SOLUTION/ DROPS OPHTHALMIC at 08:16

## 2020-04-30 RX ADMIN — DAPTOMYCIN SCH MLS/HR: 500 INJECTION, POWDER, LYOPHILIZED, FOR SOLUTION INTRAVENOUS at 13:00

## 2020-04-30 RX ADMIN — PANTOPRAZOLE SODIUM SCH MG: 40 INJECTION, POWDER, FOR SOLUTION INTRAVENOUS at 08:17

## 2020-04-30 RX ADMIN — BACITRACIN SCH MLS/HR: 5000 INJECTION, POWDER, FOR SOLUTION INTRAMUSCULAR at 13:01

## 2020-04-30 RX ADMIN — BUMETANIDE SCH MG: 0.25 INJECTION INTRAMUSCULAR; INTRAVENOUS at 13:01

## 2020-04-30 RX ADMIN — MEROPENEM SCH MLS/HR: 1 INJECTION, POWDER, FOR SOLUTION INTRAVENOUS at 21:33

## 2020-04-30 RX ADMIN — DEXMEDETOMIDINE HYDROCHLORIDE PRN MLS/HR: 100 INJECTION, SOLUTION, CONCENTRATE INTRAVENOUS at 04:38

## 2020-04-30 RX ADMIN — MEROPENEM SCH MLS/HR: 1 INJECTION, POWDER, FOR SOLUTION INTRAVENOUS at 14:00

## 2020-04-30 RX ADMIN — DEXMEDETOMIDINE HYDROCHLORIDE PRN MLS/HR: 100 INJECTION, SOLUTION, CONCENTRATE INTRAVENOUS at 13:18

## 2020-04-30 RX ADMIN — BUMETANIDE SCH MG: 0.25 INJECTION INTRAMUSCULAR; INTRAVENOUS at 08:17

## 2020-04-30 RX ADMIN — DEXMEDETOMIDINE HYDROCHLORIDE PRN MLS/HR: 100 INJECTION, SOLUTION, CONCENTRATE INTRAVENOUS at 10:36

## 2020-04-30 RX ADMIN — INSULIN LISPRO SCH UNITS: 100 INJECTION, SOLUTION INTRAVENOUS; SUBCUTANEOUS at 06:00

## 2020-04-30 RX ADMIN — Medication PRN EACH: at 10:51

## 2020-04-30 RX ADMIN — DEXTROSE SCH MLS/HR: 50 INJECTION, SOLUTION INTRAVENOUS at 08:18

## 2020-04-30 RX ADMIN — DEXMEDETOMIDINE HYDROCHLORIDE PRN MLS/HR: 100 INJECTION, SOLUTION, CONCENTRATE INTRAVENOUS at 21:17

## 2020-04-30 RX ADMIN — MEROPENEM SCH MLS/HR: 1 INJECTION, POWDER, FOR SOLUTION INTRAVENOUS at 06:23

## 2020-04-30 RX ADMIN — DEXTRAN 70, GLYCERIN, HYPROMELLOSE PRN DROP: 1; 2; 3 SOLUTION/ DROPS OPHTHALMIC at 01:04

## 2020-04-30 RX ADMIN — INSULIN LISPRO SCH UNITS: 100 INJECTION, SOLUTION INTRAVENOUS; SUBCUTANEOUS at 17:29

## 2020-04-30 RX ADMIN — HEPARIN SODIUM SCH UNIT: 5000 INJECTION, SOLUTION INTRAVENOUS; SUBCUTANEOUS at 21:22

## 2020-04-30 NOTE — NUR
Order received from Dr. Castano to place patient on speaking valve. Patient tolerating trach 
shield well since 1100, SpO2 97%. ST stated she would work with patient when tolerating 
speaking valve well. 

Anxiety is well controlled today, RN able to decrease Precedex gtt. 



Patient is currently sitting in chair. Was able to stand for short moment w/ walker, PT.



Patient's daughter, Beth, is at bedside. Brought patient her cell phone, shampoo.

## 2020-04-30 NOTE — NUR
Pt has been on cpap trial since noon and is now back on vent settings for the night. 
Tolerated the cpap trial well.

## 2020-04-30 NOTE — PDOC
PROGRESS NOTES


Chief Complaint


Chief Complaint


Acute hypoxic Respiratory failure requiring mechanical ventilation (on vent 

since 3/23)


Tracheostomy


bilateral pleural effusions/pulm edema 


Sepsis


Severe Acute gallstone pancreatitis (not a surgical candidate at this time) with

necrosis


Acute kidney failure now requiring dialysis


Salpingitis


Gallstones (Calculus of gallbladder with acute cholecystitis without 

obstruction)


HTN 


Leukocytosis 


Hypoxia


Uterine fibroid


Intractable pain


Intractable nausea


Covid 19 negative. 


Acute on chronic anemia 


EEG: No seizure activity


ESRD on HD


Hyperglycemia





History of Present Illness


History of Present Illness


Ms Tadeo is a 48yo F w/ PMHx HTN, prediabetes who presented to the emergency 

room with complaints of abdominal pain on 3/16/2020. Found with Lipase 52160, 

, , Bilirubin 1.4.


CT abdomen confirms pancreatic inflammation, peripancreatic fluid and 

inflammatory changes around the pancreas consistent with pancreatitis. 

Cholelithiasis and 1.4cm uterine fibroid as well as possible left salpingitis. 

Admitted for further care


GI, General surgery, ID, Pulm consulted.





3/17: PICC placed per IR. Renal US negative. Started on levophed. Repeat CT 

abdomen w/ necrosis; 3/18: Dialysis catheter per nephrology; 3/19: On BiPAP; 

3/20: BiPAP, dialysis; 3/21: Overnight Tmax 101.7 , still on BiPAP FiO2 40%, 

still on low dose Levophed gtt, TPN initiated. On dialysis


4/6: Tracheostomy; 4/12: S/p tracheostomy on vent spontaneous respirations with 

5 of pressure support 35% FiO2, rectal tube and a Chino, off pressors; 

4/14:Still on vent via trach. Removed PICC and CVC LIJ and replaced. CT 

chest/abd/pelvis with bilateral pleural effusion and ascites.


4/15: Renal function stable. Still on vent. More interactive today. Miming wish 

for food. Plan discussed for thoracentesis/paracentesis with daughters today. 

They were under impression patient was doing worse due to a miscommunication 

which has been clarified over the phone. 4.3L removed.


4/17: Febrile overnight 101.8F. More interactive, still on vent. Asking for ice 

by miming; 4/18: Afebrile overnight. TMax last 24 hours 100.6F. Hb 7.1. 

Interactive when awake. 4/22: Transfusion 1u PRBC (6U total since admit)


4/23-4/26: TPN and precedex, vent.


4/27: Tmax 101F overnight. Hb 8.2. HD cath out since 4/24. Alert. On vent SIMV 

35% FiO2. Surgery: ex-lap, no naif or pancreatic necrosectomy 2/2 profound 

inflammation.


4/28: Seen POD #1. Afebrile overnight. BUN 62. CBC WNL today.Remains on vent via

trach, TPN. Able to point today and indicate she wishes her daughters and Rolf 

to be involved in her care.


4/29: Hb 7.4, Na 151. Remains on vent via trach, TPN. off HD for now. Not 

tolerating trach shield well.





Negative US for UE DVT. More relaxed today. Has some increase in UOP. Tolerating

vent well. She is requesting to eat and drink via writing. T max 100F 24 hours 

ago. Right PICC placed





Plan:


Cont vent weaning


Trach shield during day if ok with pulm. Would recommend ABG after 4 hours to 

r/o CO2 retention, however and still vent overnight


Remove IJ.





Vitals


Vitals





Vital Signs








  Date Time  Temp Pulse Resp B/P (MAP) Pulse Ox O2 Delivery O2 Flow Rate FiO2


 


4/30/20 07:00  78 19 87/49 (62) 98 Ventilator  


 


4/30/20 04:00 97.6       





 97.6       











Physical Exam


Physical Exam


GENERAL: Propped up in bed, sedated weak appearing 


HEENT: Pupils equal, + NGT, oral cavity dry 


NECK:  Trach/vent 


LUNGS: rhonchi 


HEART:  S1, S2, regular 


ABDOMEN: Distended, hypoactive BS, drain placement (4/27 )


: Chino (4/14)


EXTREMITIES: Generalized edema, no cyanosis, SCDs bilaterally 


DERMATOLOGIC:  Warm and dry.  No generalized rash.  


CENTRAL NERVOUS SYSTEM: Extremely weak, nods to few simple questions 


HDC has been removed


LIJ (4/14) clean


General:  Alert, Cooperative, No acute distress


Heart:  Regular rate, Normal S1, Other (increased rate)


Lungs:  Crackles, Other (dimished in BLL  nc at perrl   nose clear   neck trach 

site ok   no lad  no thyromegaly)


Abdomen:  Soft, No tenderness, Other (ROBERT serous)


Extremities:  Other (ANASARCA)


Skin:  Other (mottling noted to extremities )





Labs


LABS





Laboratory Tests








Test


 4/29/20


08:00 4/29/20


12:25 4/29/20


17:07 4/30/20


00:34


 


White Blood Count


 14.5 x10^3/uL


(4.0-11.0) 


 


 





 


Red Blood Count


 2.89 x10^6/uL


(3.50-5.40) 


 


 





 


Hemoglobin


 8.5 g/dL


(12.0-15.5) 


 


 





 


Hematocrit


 26.9 %


(36.0-47.0) 


 


 





 


Mean Corpuscular Volume 93 fL ()    


 


Mean Corpuscular Hemoglobin 29 pg (25-35)    


 


Mean Corpuscular Hemoglobin


Concent 32 g/dL


(31-37) 


 


 





 


Red Cell Distribution Width


 18.5 %


(11.5-14.5) 


 


 





 


Platelet Count


 446 x10^3/uL


(140-400) 


 


 





 


Glucose (Fingerstick)


 


 125 mg/dL


(70-99) 133 mg/dL


(70-99) 112 mg/dL


(70-99)











Assessment and Plan


Assessmemt and Plan


Problems


Medical Problems:


(1) Acute pancreatitis


Status: Acute  





(2) Cholelithiasis


Status: Acute  











Comment


Review of Relevant


I have reviewed the following items josy (where applicable) has been applied.


Labs





Laboratory Tests








Test


 4/28/20


12:08 4/28/20


18:44 4/28/20


23:40 4/29/20


06:10


 


Glucose (Fingerstick)


 148 mg/dL


(70-99) 116 mg/dL


(70-99) 94 mg/dL


(70-99) 





 


White Blood Count


 


 


 


 10.4 x10^3/uL


(4.0-11.0)


 


Red Blood Count


 


 


 


 2.51 x10^6/uL


(3.50-5.40)


 


Hemoglobin


 


 


 


 7.4 g/dL


(12.0-15.5)


 


Hematocrit


 


 


 


 22.9 %


(36.0-47.0)


 


Mean Corpuscular Volume    91 fL () 


 


Mean Corpuscular Hemoglobin    29 pg (25-35) 


 


Mean Corpuscular Hemoglobin


Concent 


 


 


 32 g/dL


(31-37)


 


Red Cell Distribution Width


 


 


 


 18.4 %


(11.5-14.5)


 


Platelet Count


 


 


 


 439 x10^3/uL


(140-400)


 


Sodium Level


 


 


 


 151 mmol/L


(136-145)


 


Potassium Level


 


 


 


 3.6 mmol/L


(3.5-5.1)


 


Chloride Level


 


 


 


 114 mmol/L


()


 


Carbon Dioxide Level


 


 


 


 29 mmol/L


(21-32)


 


Anion Gap    8 (6-14) 


 


Blood Urea Nitrogen


 


 


 


 58 mg/dL


(7-20)


 


Creatinine


 


 


 


 1.0 mg/dL


(0.6-1.0)


 


Estimated GFR


(Cockcroft-Gault) 


 


 


 58.9 





 


Glucose Level


 


 


 


 104 mg/dL


(70-99)


 


Calcium Level


 


 


 


 8.2 mg/dL


(8.5-10.1)


 


Test


 4/29/20


06:14 4/29/20


08:00 4/29/20


12:25 4/29/20


17:07


 


Glucose (Fingerstick)


 87 mg/dL


(70-99) 


 125 mg/dL


(70-99) 133 mg/dL


(70-99)


 


White Blood Count


 


 14.5 x10^3/uL


(4.0-11.0) 


 





 


Red Blood Count


 


 2.89 x10^6/uL


(3.50-5.40) 


 





 


Hemoglobin


 


 8.5 g/dL


(12.0-15.5) 


 





 


Hematocrit


 


 26.9 %


(36.0-47.0) 


 





 


Mean Corpuscular Volume  93 fL ()   


 


Mean Corpuscular Hemoglobin  29 pg (25-35)   


 


Mean Corpuscular Hemoglobin


Concent 


 32 g/dL


(31-37) 


 





 


Red Cell Distribution Width


 


 18.5 %


(11.5-14.5) 


 





 


Platelet Count


 


 446 x10^3/uL


(140-400) 


 





 


Test


 4/30/20


00:34 


 


 





 


Glucose (Fingerstick)


 112 mg/dL


(70-99) 


 


 











Laboratory Tests








Test


 4/29/20


08:00 4/29/20


12:25 4/29/20


17:07 4/30/20


00:34


 


White Blood Count


 14.5 x10^3/uL


(4.0-11.0) 


 


 





 


Red Blood Count


 2.89 x10^6/uL


(3.50-5.40) 


 


 





 


Hemoglobin


 8.5 g/dL


(12.0-15.5) 


 


 





 


Hematocrit


 26.9 %


(36.0-47.0) 


 


 





 


Mean Corpuscular Volume 93 fL ()    


 


Mean Corpuscular Hemoglobin 29 pg (25-35)    


 


Mean Corpuscular Hemoglobin


Concent 32 g/dL


(31-37) 


 


 





 


Red Cell Distribution Width


 18.5 %


(11.5-14.5) 


 


 





 


Platelet Count


 446 x10^3/uL


(140-400) 


 


 





 


Glucose (Fingerstick)


 


 125 mg/dL


(70-99) 133 mg/dL


(70-99) 112 mg/dL


(70-99)








Microbiology


4/15/20 Aerobic and Anaerobic Culture - Final, Complete


          


4/15/20 Anaerobic Culture Result 1 (ANSON) - Final, Complete


          


4/15/20 Aerobic Culture - Final, Complete


          


4/15/20 Aerobic Culture Result 1 (ANSON) - Final, Complete


          


4/15/20 Gram Stain - Final, Complete


          


4/15/20 Gram Stain Result 1 (ANSON) - Final, Complete


          


4/15/20 Gram Stain Result 2 (ANSON) - Final, Complete


          


4/14/20 Blood Culture - Final, Complete


          NO GROWTH AFTER 5 DAYS


4/12/20 Urine Culture - Final, Complete


          


4/12/20 Urine Culture Result 1 (ANSON) - Final, Complete


Medications





Current Medications


Sodium Chloride 1,000 ml @  1,000 mls/hr Q1H IV  Last administered on 3/16/20at 

03:00;  Start 3/16/20 at 03:00;  Stop 3/16/20 at 03:59;  Status DC


Ondansetron HCl (Zofran) 4 mg 1X  ONCE IVP  Last administered on 3/16/20at 

03:27;  Start 3/16/20 at 03:00;  Stop 3/16/20 at 03:01;  Status DC


Morphine Sulfate (Morphine Sulfate) 4 mg 1X  ONCE IV ;  Start 3/16/20 at 03:00; 

Stop 3/16/20 at 03:01;  Status Cancel


Ketorolac Tromethamine (Toradol 30mg Vial) 30 mg 1X  ONCE IV  Last administered 

on 3/16/20at 02:54;  Start 3/16/20 at 03:00;  Stop 3/16/20 at 03:01;  Status DC


Fentanyl Citrate (Fentanyl 2ml Vial) 25 mcg 1X  ONCE IVP  Last administered on 

3/16/20at 03:23;  Start 3/16/20 at 03:30;  Stop 3/16/20 at 03:31;  Status DC


Fentanyl Citrate (Fentanyl 2ml Vial) 100 mcg STK-MED ONCE .ROUTE ;  Start 

3/16/20 at 03:18;  Stop 3/16/20 at 03:18;  Status DC


Iohexol (Omnipaque 350 Mg/ml) 90 ml 1X  ONCE IV  Last administered on 3/16/20at 

03:25;  Start 3/16/20 at 03:30;  Stop 3/16/20 at 03:31;  Status DC


Info (CONTRAST GIVEN -- Rx MONITORING) 1 each PRN DAILY  PRN MC SEE COMMENTS;  

Start 3/16/20 at 03:30;  Stop 3/18/20 at 03:29;  Status DC


Hydromorphone HCl (Dilaudid) 0.5 mg 1X  ONCE IV  Last administered on 3/16/20at 

03:55;  Start 3/16/20 at 04:30;  Stop 3/16/20 at 04:32;  Status DC


Ondansetron HCl (Zofran) 4 mg PRN Q8HRS  PRN IV NAUSEA/VOMITING 1ST CHOICE;  

Start 3/16/20 at 05:00;  Stop 3/16/20 at 09:27;  Status DC


Morphine Sulfate (Morphine Sulfate) 2 mg PRN Q2HR  PRN IV SEVERE PAIN 7-10 Last 

administered on 3/17/20at 12:26;  Start 3/16/20 at 05:00;  Stop 3/17/20 at 

14:15;  Status DC


Sodium Chloride 1,000 ml @  125 mls/hr Q8H IV  Last administered on 3/16/20at 

20:56;  Start 3/16/20 at 05:00;  Stop 3/17/20 at 04:59;  Status DC


Hydromorphone HCl (Dilaudid) 0.5 mg PRN Q3HRS  PRN IV SEVERE PAIN 7-10 Last 

administered on 3/17/20at 10:06;  Start 3/16/20 at 05:00;  Stop 3/17/20 at 

12:01;  Status DC


Piperacillin Sod/ Tazobactam Sod 4.5 gm/Sodium Chloride 100 ml @  200 mls/hr 1X 

ONCE IV  Last administered on 3/16/20at 05:44;  Start 3/16/20 at 06:00;  Stop 

3/16/20 at 06:29;  Status DC


Ondansetron HCl (Zofran) 4 mg PRN Q4HRS  PRN IV NAUSEA/VOMITING 1ST CHOICE Last 

administered on 4/24/20at 11:01;  Start 3/16/20 at 09:30


Insulin Human Lispro (HumaLOG) 0-9 UNITS Q6HRS SQ  Last administered on 

4/28/20at 08:42;  Start 3/16/20 at 09:30


Dextrose (Dextrose 50%-Water Syringe) 12.5 gm PRN Q15MIN  PRN IV SEE COMMENTS;  

Start 3/16/20 at 09:30


Pantoprazole Sodium (PROTONIX VIAL for IV PUSH) 40 mg DAILYAC IVP  Last 

administered on 4/29/20at 07:39;  Start 3/16/20 at 11:30


Prochlorperazine Edisylate (Compazine) 10 mg PRN Q6HRS  PRN IV NAUSEA/VOMITING, 

2nd CHOICE Last administered on 4/29/20at 14:59;  Start 3/16/20 at 17:45


Atenolol (Tenormin) 100 mg DAILY PO ;  Start 3/17/20 at 09:00;  Stop 3/16/20 at 

20:08;  Status DC


Metoprolol Tartrate (Lopressor Vial) 2.5 mg Q6HRS IVP  Last administered on 

3/17/20at 05:51;  Start 3/16/20 at 20:15;  Stop 3/17/20 at 10:02;  Status DC


Metoprolol Tartrate (Lopressor Vial) 5 mg Q6HRS IVP  Last administered on 

3/26/20at 00:12;  Start 3/17/20 at 10:15;  Stop 3/28/20 at 08:48;  Status DC


Hydromorphone HCl (Dilaudid) 1 mg PRN Q3HRS  PRN IV SEVERE PAIN 7-10 Last 

administered on 3/23/20at 05:13;  Start 3/17/20 at 12:00;  Stop 3/31/20 at 

00:25;  Status DC


Lidocaine HCl (Buffered Lidocaine 1%) 3 ml STK-MED ONCE .ROUTE ;  Start 3/17/20 

at 12:55;  Stop 3/17/20 at 12:56;  Status DC


Albumin Human 500 ml @  125 mls/hr 1X  ONCE IV  Last administered on 3/17/20at 

14:33;  Start 3/17/20 at 14:30;  Stop 3/17/20 at 18:32;  Status DC


Norepinephrine Bitartrate 8 mg/ Dextrose 258 ml @  17.299 mls/ hr CONT  PRN IV 

PER PROTOCOL Last administered on 4/14/20at 12:48;  Start 3/17/20 at 15:30;  

Stop 4/17/20 at 09:19;  Status DC


Sodium Chloride 1,000 ml @  125 mls/hr Q8H IV  Last administered on 3/17/20at 

21:04;  Start 3/17/20 at 16:00;  Stop 3/18/20 at 02:42;  Status DC


Albumin Human 500 ml @  125 mls/hr PRN BID  PRN IV After every 2L NSS & BP < 

90mm Last administered on 4/1/20at 14:21;  Start 3/17/20 at 16:00


Iohexol (Omnipaque 300 Mg/ml) 60 ml 1X  ONCE IV  Last administered on 3/17/20at 

17:20;  Start 3/17/20 at 17:00;  Stop 3/17/20 at 17:01;  Status DC


Info (CONTRAST GIVEN -- Rx MONITORING) 1 each PRN DAILY  PRN MC SEE COMMENTS;  

Start 3/17/20 at 17:00;  Stop 3/19/20 at 16:59;  Status DC


Meropenem 1 gm/ Sodium Chloride 100 ml @  200 mls/hr Q8HRS IV  Last administered

on 3/18/20at 05:45;  Start 3/17/20 at 20:00;  Stop 3/18/20 at 08:48;  Status DC


Furosemide (Lasix) 40 mg 1X  ONCE IVP  Last administered on 3/17/20at 22:12;  

Start 3/17/20 at 22:30;  Stop 3/17/20 at 22:31;  Status DC


Calcium Chloride 1000 mg/Sodium Chloride 110 ml @  220 mls/hr 1X  ONCE IV  Last 

administered on 3/17/20at 22:11;  Start 3/17/20 at 22:30;  Stop 3/17/20 at 

22:59;  Status DC


Albuterol Sulfate (Ventolin Neb Soln) 2.5 mg 1X  ONCE NEB  Last administered on 

3/18/20at 00:56;  Start 3/17/20 at 22:30;  Stop 3/17/20 at 22:31;  Status DC


Insulin Human Regular (HumuLIN R VIAL) 5 unit 1X  ONCE IV  Last administered on 

3/17/20at 22:14;  Start 3/17/20 at 22:30;  Stop 3/17/20 at 22:31;  Status DC


Magnesium Sulfate 50 ml @ 25 mls/hr 1X  ONCE IV  Last administered on 3/18/20at 

02:57;  Start 3/18/20 at 03:00;  Stop 3/18/20 at 04:59;  Status DC


Calcium Gluconate 1000 mg/Sodium Chloride 110 ml @  220 mls/hr 1X  ONCE IV  Last

administered on 3/18/20at 02:46;  Start 3/18/20 at 03:00;  Stop 3/18/20 at 

03:29;  Status DC


Sodium Chloride 1,000 ml @  200 mls/hr Q5H IV  Last administered on 3/18/20at 

02:46;  Start 3/18/20 at 03:00;  Stop 3/18/20 at 10:21;  Status DC


Calcium Gluconate 1000 mg/Sodium Chloride 110 ml @  220 mls/hr 1X  ONCE IV  Last

administered on 3/18/20at 03:21;  Start 3/18/20 at 03:30;  Stop 3/18/20 at 

03:59;  Status DC


Sodium Bicarbonate 50 meq/Sodium Chloride 1,050 ml @  75 mls/hr Q14H IV  Last 

administered on 3/22/20at 21:10;  Start 3/18/20 at 07:30;  Stop 3/23/20 at 

10:28;  Status DC


Calcium Gluconate 2000 mg/Sodium Chloride 120 ml @  220 mls/hr 1X  ONCE IV  Last

administered on 3/18/20at 09:05;  Start 3/18/20 at 07:30;  Stop 3/18/20 at 

08:02;  Status DC


Lidocaine HCl (Xylocaine-Mpf 1% 2ml Vial) 2 ml STK-MED ONCE .ROUTE ;  Start 

3/18/20 at 08:47;  Stop 3/18/20 at 08:47;  Status DC


Meropenem 500 mg/ Sodium Chloride 50 ml @  100 mls/hr Q12HR IV  Last 

administered on 3/23/20at 21:01;  Start 3/18/20 at 18:00;  Stop 3/24/20 at 

07:58;  Status DC


Lidocaine HCl (Buffered Lidocaine 1%) 3 ml STK-MED ONCE .ROUTE ;  Start 3/18/20 

at 09:46;  Stop 3/18/20 at 09:46;  Status DC


Lidocaine HCl (Buffered Lidocaine 1%) 6 ml 1X  ONCE INJ  Last administered on 

3/18/20at 10:26;  Start 3/18/20 at 10:15;  Stop 3/18/20 at 10:16;  Status DC


Info (Tpn Per Pharmacy) 1 each PRN DAILY  PRN MC SEE COMMENTS Last administered 

on 4/29/20at 13:50;  Start 3/18/20 at 12:00


Sodium Chloride 1,000 ml @  1,000 mls/hr Q1H PRN IV hypotension;  Start 3/18/20 

at 12:07;  Stop 3/18/20 at 18:06;  Status DC


Diphenhydramine HCl (Benadryl) 25 mg 1X PRN  PRN IV ITCHING;  Start 3/18/20 at 

12:15;  Stop 3/19/20 at 12:14;  Status DC


Diphenhydramine HCl (Benadryl) 25 mg 1X PRN  PRN IV ITCHING;  Start 3/18/20 at 

12:15;  Stop 3/19/20 at 12:14;  Status DC


Sodium Chloride 1,000 ml @  400 mls/hr Q2H30M PRN IV PATENCY;  Start 3/18/20 at 

12:07;  Stop 3/19/20 at 00:06;  Status DC


Info (PHARMACY MONITORING -- do not chart) 1 each PRN DAILY  PRN MC SEE 

COMMENTS;  Start 3/18/20 at 12:15;  Stop 3/20/20 at 08:13;  Status DC


Sodium Chloride 90 meq/Calcium Gluconate 10 meq/ Multivitamins 10 ml/Chromium/ 

Copper/Manganese/ Seleni/Zn 1 ml/ Total Parenteral Nutrition/Amino 

Acids/Dextrose/ Fat Emulsion Intravenous 55.005 ml  @ 2.292 mls/hr TPN  CONT IV 

;  Start 3/18/20 at 22:00;  Stop 3/18/20 at 12:33;  Status DC


Info (Tpn Per Pharmacy) 1 each PRN DAILY  PRN MC SEE COMMENTS;  Start 3/18/20 at

12:30;  Status UNV


Sodium Chloride 90 meq/Calcium Gluconate 10 meq/ Multivitamins 10 ml/Chromium/ 

Copper/Manganese/ Seleni/Zn 0.5 ml/ Total Parenteral Nutrition/Amino 

Acids/Dextrose/ Fat Emulsion Intravenous 1,512 ml @  63 mls/hr TPN  CONT IV  

Last administered on 3/18/20at 22:06;  Start 3/18/20 at 22:00;  Stop 3/19/20 at 

21:59;  Status DC


Calcium Carbonate/ Glycine (Tums) 500 mg PRN AFTMEALHC  PRN PO INDIGESTION;  

Start 3/18/20 at 17:45


Calcium Gluconate (Calcium Gluconate) 2,000 mg 1X  ONCE IVP  Last administered 

on 3/19/20at 02:19;  Start 3/19/20 at 02:15;  Stop 3/19/20 at 02:16;  Status DC


Calcium Chloride 3000 mg/Sodium Chloride 1,030 ml @  50 mls/hr [id number] IV  Last 

administered on 3/21/20at 02:17;  Start 3/19/20 at 08:00;  Stop 3/21/20 at 

15:23;  Status DC


Lorazepam (Ativan Inj) 1 mg PRN Q4HRS  PRN IVP ANXIETY / AGITATION, 2nd choic 

Last administered on 4/17/20at 03:51;  Start 3/19/20 at 09:00;  Stop 4/17/20 at 

09:19;  Status DC


Sodium Chloride 1,000 ml @  1,000 mls/hr Q1H PRN IV hypotension;  Start 3/19/20 

at 08:56;  Stop 3/19/20 at 14:55;  Status DC


Albumin Human 200 ml @  200 mls/hr 1X PRN  PRN IV Hypotension;  Start 3/19/20 at

09:00;  Stop 3/19/20 at 14:59;  Status DC


Diphenhydramine HCl (Benadryl) 25 mg 1X PRN  PRN IV ITCHING;  Start 3/19/20 at 

09:00;  Stop 3/20/20 at 08:59;  Status DC


Diphenhydramine HCl (Benadryl) 25 mg 1X PRN  PRN IV ITCHING;  Start 3/19/20 at 

09:00;  Stop 3/20/20 at 08:59;  Status DC


Sodium Chloride 1,000 ml @  400 mls/hr Q2H30M PRN IV PATENCY;  Start 3/19/20 at 

08:56;  Stop 3/19/20 at 20:55;  Status DC


Info (PHARMACY MONITORING -- do not chart) 1 each PRN DAILY  PRN MC SEE 

COMMENTS;  Start 3/19/20 at 09:00;  Status UNV


Info (PHARMACY MONITORING -- do not chart) 1 each PRN DAILY  PRN MC SEE 

COMMENTS;  Start 3/19/20 at 09:00;  Stop 3/20/20 at 08:13;  Status DC


Digoxin (Lanoxin) 500 mcg 1X  ONCE IV  Last administered on 3/19/20at 10:04;  

Start 3/19/20 at 10:00;  Stop 3/19/20 at 10:01;  Status DC


Digoxin (Lanoxin) 125 mcg 1X  ONCE IV  Last administered on 3/19/20at 17:10;  

Start 3/19/20 at 18:00;  Stop 3/19/20 at 18:01;  Status DC


Magnesium Sulfate 100 ml @  25 mls/hr 1X  ONCE IV  Last administered on 

3/19/20at 12:48;  Start 3/19/20 at 13:00;  Stop 3/19/20 at 16:59;  Status DC


Sodium Chloride 90 meq/Magnesium Sulfate 10 meq/ Calcium Gluconate 20 meq/ 

Multivitamins 10 ml/Chromium/ Copper/Manganese/ Seleni/Zn 0.5 ml/ Total 

Parenteral Nutrition/Amino Acids/Dextrose/ Fat Emulsion Intravenous 1,512 ml @  

63 mls/hr TPN  CONT IV  Last administered on 3/19/20at 22:25;  Start 3/19/20 at 

22:00;  Stop 3/20/20 at 21:59;  Status DC


Sodium Chloride 1,000 ml @  1,000 mls/hr Q1H PRN IV hypotension;  Start 3/20/20 

at 08:05;  Stop 3/20/20 at 14:04;  Status DC


Albumin Human 200 ml @  200 mls/hr 1X  ONCE IV  Last administered on 3/20/20at 

08:57;  Start 3/20/20 at 08:15;  Stop 3/20/20 at 09:14;  Status DC


Diphenhydramine HCl (Benadryl) 25 mg 1X PRN  PRN IV ITCHING;  Start 3/20/20 at 

08:15;  Stop 3/21/20 at 08:14;  Status DC


Diphenhydramine HCl (Benadryl) 25 mg 1X PRN  PRN IV ITCHING;  Start 3/20/20 at 

08:15;  Stop 3/21/20 at 08:14;  Status DC


Sodium Chloride 1,000 ml @  400 mls/hr Q2H30M PRN IV PATENCY;  Start 3/20/20 at 

08:05;  Stop 3/20/20 at 20:04;  Status DC


Info (PHARMACY MONITORING -- do not chart) 1 each PRN DAILY  PRN MC SEE 

COMMENTS;  Start 3/20/20 at 08:15;  Stop 3/24/20 at 07:57;  Status DC


Sodium Chloride 90 meq/Potassium Chloride 15 meq/ Potassium Phosphate 10 mmol/ 

Magnesium Sulfate 10 meq/Calcium Gluconate 20 meq/ Multivitamins 10 ml/Chromium/

Copper/Manganese/ Seleni/Zn 0.5 ml/ Total Parenteral Nutrition/Amino 

Acids/Dextrose/ Fat Emulsion Intravenous 1,512 ml @  63 mls/hr TPN  CONT IV  

Last administered on 3/20/20at 21:01;  Start 3/20/20 at 22:00;  Stop 3/21/20 at 

21:59;  Status DC


Potassium Chloride/Water 100 ml @  100 mls/hr 1X  ONCE IV  Last administered on 

3/20/20at 14:09;  Start 3/20/20 at 14:00;  Stop 3/20/20 at 14:59;  Status DC


Benzocaine (Hurricaine One) 1 spray 1X  ONCE MM  Last administered on 3/20/20at 

16:38;  Start 3/20/20 at 14:30;  Stop 3/20/20 at 14:31;  Status DC


Lidocaine HCl (Glydo (Lidocaine) Jelly) 1 thomas 1X  ONCE MM  Last administered on 

3/20/20at 16:38;  Start 3/20/20 at 14:30;  Stop 3/20/20 at 14:31;  Status DC


Linezolid/Dextrose 300 ml @  300 mls/hr Q12HR IV  Last administered on 3/26/20at

21:04;  Start 3/20/20 at 20:00;  Stop 3/27/20 at 07:50;  Status DC


Acetaminophen (Tylenol) 650 mg PRN Q6HRS  PRN PO MILD PAIN / TEMP;  Start 

3/21/20 at 03:30;  Stop 3/21/20 at 03:36;  Status DC


Acetaminophen (Tylenol) 650 mg PRN Q6HRS  PRN PEG MILD PAIN / TEMP Last 

administered on 4/16/20at 19:56;  Start 3/21/20 at 03:36


Sodium Chloride 1,000 ml @  1,000 mls/hr Q1H PRN IV hypotension;  Start 3/21/20 

at 07:50;  Stop 3/21/20 at 13:49;  Status DC


Albumin Human 200 ml @  200 mls/hr 1X PRN  PRN IV Hypotension;  Start 3/21/20 at

08:00;  Stop 3/21/20 at 13:59;  Status DC


Sodium Chloride (Normal Saline Flush) 10 ml 1X PRN  PRN IV AP catheter pack;  

Start 3/21/20 at 08:00;  Stop 3/22/20 at 07:59;  Status DC


Sodium Chloride (Normal Saline Flush) 10 ml 1X PRN  PRN IV  catheter pack;  

Start 3/21/20 at 08:00;  Stop 3/22/20 at 07:59;  Status DC


Sodium Chloride 1,000 ml @  400 mls/hr Q2H30M PRN IV PATENCY;  Start 3/21/20 at 

07:50;  Stop 3/21/20 at 19:49;  Status DC


Info (PHARMACY MONITORING -- do not chart) 1 each PRN DAILY  PRN MC SEE 

COMMENTS;  Start 3/21/20 at 08:00;  Status UNV


Info (PHARMACY MONITORING -- do not chart) 1 each PRN DAILY  PRN MC SEE 

COMMENTS;  Start 3/21/20 at 08:00;  Stop 3/23/20 at 08:25;  Status DC


Sodium Chloride 90 meq/Potassium Chloride 15 meq/ Potassium Phosphate 10 mmol/ 

Magnesium Sulfate 10 meq/Calcium Gluconate 20 meq/ Multivitamins 10 ml/Chromium/

Copper/Manganese/ Seleni/Zn 0.5 ml/ Total Parenteral Nutrition/Amino 

Acids/Dextrose/ Fat Emulsion Intravenous 1,512 ml @  63 mls/hr TPN  CONT IV  

Last administered on 3/21/20at 20:57;  Start 3/21/20 at 22:00;  Stop 3/22/20 at 

21:59;  Status DC


Sodium Chloride 90 meq/Potassium Chloride 15 meq/ Potassium Phosphate 15 mmol/ 

Magnesium Sulfate 10 meq/Calcium Gluconate 20 meq/ Multivitamins 10 ml/Chromium/

Copper/Manganese/ Seleni/Zn 0.5 ml/ Total Parenteral Nutrition/Amino 

Acids/Dextrose/ Fat Emulsion Intravenous 1,512 ml @  63 mls/hr TPN  CONT IV ;  

Start 3/22/20 at 22:00;  Stop 3/22/20 at 14:16;  Status DC


Sodium Chloride 90 meq/Potassium Chloride 15 meq/ Potassium Phosphate 15 mmol/ 

Magnesium Sulfate 10 meq/Calcium Gluconate 20 meq/ Multivitamins 10 ml/Chromium/

Copper/Manganese/ Seleni/Zn 0.5 ml/ Total Parenteral Nutrition/Amino 

Acids/Dextrose/ Fat Emulsion Intravenous 1,200 ml @  50 mls/hr TPN  CONT IV ;  S

tart 3/22/20 at 22:00;  Stop 3/22/20 at 14:17;  Status DC


Sodium Chloride 90 meq/Potassium Chloride 15 meq/ Potassium Phosphate 10 mmol/ 

Magnesium Sulfate 10 meq/Calcium Gluconate 20 meq/ Multivitamins 10 ml/Chromium/

Copper/Manganese/ Seleni/Zn 0.5 ml/ Total Parenteral Nutrition/Amino 

Acids/Dextrose/ Fat Emulsion Intravenous 1,200 ml @  50 mls/hr TPN  CONT IV  

Last administered on 3/22/20at 23:29;  Start 3/22/20 at 22:00;  Stop 3/23/20 at 

21:59;  Status DC


Sodium Chloride 1,000 ml @  1,000 mls/hr Q1H PRN IV hypotension;  Start 3/23/20 

at 07:28;  Stop 3/23/20 at 13:27;  Status DC


Albumin Human 200 ml @  200 mls/hr 1X  ONCE IV  Last administered on 3/23/20at 

08:51;  Start 3/23/20 at 07:30;  Stop 3/23/20 at 08:29;  Status DC


Diphenhydramine HCl (Benadryl) 25 mg 1X PRN  PRN IV ITCHING;  Start 3/23/20 at 

07:30;  Stop 3/24/20 at 07:29;  Status DC


Diphenhydramine HCl (Benadryl) 25 mg 1X PRN  PRN IV ITCHING;  Start 3/23/20 at 

07:30;  Stop 3/24/20 at 07:29;  Status DC


Sodium Chloride 1,000 ml @  400 mls/hr Q2H30M PRN IV PATENCY;  Start 3/23/20 at 

07:28;  Stop 3/23/20 at 19:27;  Status DC


Info (PHARMACY MONITORING -- do not chart) 1 each PRN DAILY  PRN MC SEE 

COMMENTS;  Start 3/23/20 at 07:30;  Stop 4/3/20 at 13:01;  Status DC


Metronidazole 100 ml @  100 mls/hr Q6HRS IV  Last administered on 4/8/20at 

06:26;  Start 3/23/20 at 08:30;  Stop 4/8/20 at 09:58;  Status DC


Micafungin Sodium 100 mg/Dextrose 100 ml @  100 mls/hr Q24H IV  Last 

administered on 4/29/20at 08:43;  Start 3/23/20 at 09:00


Propofol 0 ml @ As Directed STK-MED ONCE IV ;  Start 3/23/20 at 07:53;  Stop 

3/23/20 at 07:53;  Status DC


Etomidate (Amidate) 20 mg STK-MED ONCE IV ;  Start 3/23/20 at 07:53;  Stop 

3/23/20 at 07:54;  Status DC


Midazolam HCl (Versed) 5 mg STK-MED ONCE .ROUTE ;  Start 3/23/20 at 07:57;  Stop

3/23/20 at 07:57;  Status DC


Fentanyl Citrate 30 ml @ 0 mls/hr CONT  PRN IV SEE PROTOCOL Last administered on

4/17/20at 06:12;  Start 3/23/20 at 08:15;  Stop 4/17/20 at 09:19;  Status DC


Artificial Tears (Artificial Tears) 1 drop PRN Q1HR  PRN OU DRY EYE, 1st choice;

 Start 3/23/20 at 08:15;  Stop 4/29/20 at 05:31;  Status DC


Midazolam HCl 50 mg/Sodium Chloride 50 ml @ 0 mls/hr CONT  PRN IV SEE PROTOCOL 

Last administered on 3/26/20at 22:39;  Start 3/23/20 at 08:15;  Stop 3/28/20 at 

15:59;  Status DC


Etomidate (Amidate) 8 mg 1X  ONCE IV  Last administered on 3/23/20at 08:33;  

Start 3/23/20 at 08:30;  Stop 3/23/20 at 08:31;  Status DC


Succinylcholine Chloride (Anectine) 120 mg 1X  ONCE IV  Last administered on 

3/23/20at 08:34;  Start 3/23/20 at 08:30;  Stop 3/23/20 at 08:31;  Status DC


Midazolam HCl (Versed) 5 mg 1X  ONCE IV ;  Start 3/23/20 at 08:30;  Stop 3/23/20

at 08:31;  Status DC


Potassium Chloride 15 meq/ Bicarbonate Dialysis Soln w/ out KCl 5,007.5 ml  @ 

1,000 mls/ hr Q5H1M IV  Last administered on 3/24/20at 11:11;  Start 3/23/20 at 

12:00;  Stop 3/24/20 at 11:15;  Status DC


Potassium Chloride 15 meq/ Bicarbonate Dialysis Soln w/ out KCl 5,007.5 ml  @ 

1,000 mls/ hr Q5H1M IV  Last administered on 3/24/20at 11:12;  Start 3/23/20 at 

12:00;  Stop 3/24/20 at 11:17;  Status DC


Potassium Chloride 15 meq/ Bicarbonate Dialysis Soln w/ out KCl 5,007.5 ml  @ 

1,000 mls/ hr Q5H1M IV  Last administered on 3/24/20at 11:11;  Start 3/23/20 at 

12:00;  Stop 3/24/20 at 11:19;  Status DC


Sodium Chloride 90 meq/Potassium Chloride 15 meq/ Potassium Phosphate 10 mmol/ 

Magnesium Sulfate 10 meq/Calcium Gluconate 20 meq/ Multivitamins 10 ml/Chromium/

Copper/Manganese/ Seleni/Zn 0.5 ml/ Total Parenteral Nutrition/Amino 

Acids/Dextrose/ Fat Emulsion Intravenous 1,400 ml @  58.333 mls/ hr TPN  CONT IV

 Last administered on 3/23/20at 21:42;  Start 3/23/20 at 22:00;  Stop 3/24/20 at

21:59;  Status DC


Heparin Sodium (Porcine) (Heparin Sodium) 5,000 unit Q8HRS SQ  Last administered

on 3/28/20at 05:55;  Start 3/23/20 at 15:00;  Stop 3/28/20 at 13:28;  Status DC


Meropenem 500 mg/ Sodium Chloride 50 ml @  100 mls/hr Q6HRS IV  Last 

administered on 3/25/20at 06:00;  Start 3/24/20 at 09:00;  Stop 3/25/20 at 

07:29;  Status DC


Potassium Phosphate 20 mmol/ Sodium Chloride 106.6667 ml @  51.667 m... 1X  ONCE

IV  Last administered on 3/24/20at 11:22;  Start 3/24/20 at 10:15;  Stop 3/24/20

at 12:18;  Status DC


Acetaminophen (Tylenol Supp) 650 mg PRN Q6HRS  PRN ID MILD PAIN / TEMP > 100.3'F

Last administered on 4/27/20at 00:32;  Start 3/24/20 at 10:30


Potassium Chloride/Water 100 ml @  100 mls/hr Q1H IV  Last administered on 

3/24/20at 12:12;  Start 3/24/20 at 11:00;  Stop 3/24/20 at 12:59;  Status DC


Potassium Chloride 20 meq/ Bicarbonate Dialysis Soln w/ out KCl 5,010 ml @  

1,000 mls/hr Q5H1M IV  Last administered on 3/25/20at 08:48;  Start 3/24/20 at 

12:00;  Stop 3/25/20 at 13:03;  Status DC


Potassium Chloride 20 meq/ Bicarbonate Dialysis Soln w/ out KCl 5,010 ml @  

1,000 mls/hr Q5H1M IV  Last administered on 3/29/20at 14:52;  Start 3/24/20 at 

11:30;  Stop 3/29/20 at 19:59;  Status DC


Potassium Chloride 20 meq/ Bicarbonate Dialysis Soln w/ out KCl 5,010 ml @  

1,000 mls/hr Q5H1M IV  Last administered on 3/29/20at 14:53;  Start 3/24/20 at 

11:30;  Stop 3/29/20 at 19:59;  Status DC


Sodium Chloride 90 meq/Potassium Chloride 15 meq/ Potassium Phosphate 15 mmol/ 

Magnesium Sulfate 10 meq/Calcium Gluconate 15 meq/ Multivitamins 10 ml/Chromium/

Copper/Manganese/ Seleni/Zn 0.5 ml/ Total Parenteral Nutrition/Amino 

Acids/Dextrose/ Fat Emulsion Intravenous 1,400 ml @  58.333 mls/ hr TPN  CONT IV

 Last administered on 3/24/20at 22:17;  Start 3/24/20 at 22:00;  Stop 3/25/20 at

21:59;  Status DC


Cefepime HCl (Maxipime) 2 gm Q12HR IVP  Last administered on 4/7/20at 20:56;  

Start 3/25/20 at 09:00;  Stop 4/8/20 at 09:58;  Status DC


Daptomycin 500 mg/ Sodium Chloride 50 ml @  100 mls/hr Q48H IV  Last 

administered on 4/10/20at 09:57;  Start 3/25/20 at 08:30;  Stop 4/10/20 at 

10:07;  Status DC


Lidocaine HCl (Buffered Lidocaine 1%) 3 ml 1X  ONCE INJ  Last administered on 

3/25/20at 10:27;  Start 3/25/20 at 10:30;  Stop 3/25/20 at 10:31;  Status DC


Potassium Phosphate 20 mmol/ Sodium Chloride 106.6667 ml @  51.667 m... 1X  ONCE

IV  Last administered on 3/25/20at 12:51;  Start 3/25/20 at 13:00;  Stop 3/25/20

at 15:03;  Status DC


Sodium Chloride 90 meq/Potassium Chloride 15 meq/ Potassium Phosphate 18 mmol/ 

Magnesium Sulfate 8 meq/Calcium Gluconate 15 meq/ Multivitamins 10 ml/Chromium/ 

Copper/Manganese/ Seleni/Zn 0.5 ml/ Total Parenteral Nutrition/Amino 

Acids/Dextrose/ Fat Emulsion Intravenous 1,400 ml @  58.333 mls/ hr TPN  CONT IV

 Last administered on 3/25/20at 22:16;  Start 3/25/20 at 22:00;  Stop 3/26/20 at

21:59;  Status DC


Potassium Chloride 20 meq/ Bicarbonate Dialysis Soln w/ out KCl 5,010 ml @  

1,000 mls/hr Q5H1M IV  Last administered on 3/29/20at 14:54;  Start 3/25/20 at 

16:00;  Stop 3/29/20 at 19:59;  Status DC


Multi-Ingred Cream/Lotion/Oil/ Oint (Artificial Tears Eye Ointment) 1 thomas PRN 

Q1HR  PRN OU DRY EYE, 2nd choice Last administered on 4/13/20at 08:19;  Start 

3/25/20 at 17:30


Sodium Chloride 90 meq/Potassium Chloride 15 meq/ Potassium Phosphate 18 mmol/ 

Magnesium Sulfate 8 meq/Calcium Gluconate 15 meq/ Multivitamins 10 ml/Chromium/ 

Copper/Manganese/ Seleni/Zn 0.5 ml/ Total Parenteral Nutrition/Amino 

Acids/Dextrose/ Fat Emulsion Intravenous 1,400 ml @  58.333 mls/ hr TPN  CONT IV

 Last administered on 3/26/20at 22:00;  Start 3/26/20 at 22:00;  Stop 3/27/20 at

21:59;  Status DC


Albumin Human 500 ml @  125 mls/hr 1X  ONCE IV ;  Start 3/26/20 at 14:15;  Stop 

3/26/20 at 18:14;  Status DC


Sodium Chloride 90 meq/Potassium Chloride 15 meq/ Potassium Phosphate 18 mmol/ 

Magnesium Sulfate 8 meq/Calcium Gluconate 15 meq/ Multivitamins 10 ml/Chromium/ 

Copper/Manganese/ Seleni/Zn 0.5 ml/ Insulin Human Regular 10 unit/ Total 

Parenteral Nutrition/Amino Acids/Dextrose/ Fat Emulsion Intravenous 1,400 ml @  

58.333 mls/ hr TPN  CONT IV  Last administered on 3/27/20at 21:43;  Start 

3/27/20 at 22:00;  Stop 3/28/20 at 21:59;  Status DC


Lidocaine HCl (Buffered Lidocaine 1%) 3 ml STK-MED ONCE .ROUTE ;  Start 3/25/20 

at 10:00;  Stop 3/27/20 at 13:57;  Status DC


Midazolam HCl 100 mg/Sodium Chloride 100 ml @ 7 mls/hr CONT  PRN IV SEE PROTOCOL

Last administered on 4/8/20at 15:35;  Start 3/28/20 at 16:00


Sodium Chloride 90 meq/Potassium Chloride 15 meq/ Potassium Phosphate 18 mmol/ 

Magnesium Sulfate 8 meq/Calcium Gluconate 15 meq/ Multivitamins 10 ml/Chromium/ 

Copper/Manganese/ Seleni/Zn 0.5 ml/ Insulin Human Regular 15 unit/ Total 

Parenteral Nutrition/Amino Acids/Dextrose/ Fat Emulsion Intravenous 1,400 ml @  

58.333 mls/ hr TPN  CONT IV  Last administered on 3/28/20at 20:34;  Start 

3/28/20 at 22:00;  Stop 3/29/20 at 21:59;  Status DC


Info ([facility]cu Electrolyte Protocol) 1 ea CONT PRN  PRN MC PER PROTOCOL;  Start 

3/29/20 at 13:15


Sodium Chloride 90 meq/Potassium Chloride 15 meq/ Potassium Phosphate 18 mmol/ 

Magnesium Sulfate 8 meq/Calcium Gluconate 15 meq/ Multivitamins 10 ml/Chromium/ 

Copper/Manganese/ Seleni/Zn 0.5 ml/ Insulin Human Regular 15 unit/ Total 

Parenteral Nutrition/Amino Acids/Dextrose/ Fat Emulsion Intravenous 1,400 ml @  

58.333 mls/ hr TPN  CONT IV  Last administered on 3/29/20at 22:05;  Start 

3/29/20 at 22:00;  Stop 3/30/20 at 21:59;  Status DC


Potassium Chloride 15 meq/ Bicarbonate Dialysis Soln w/ out KCl 5,007.5 ml  @ 

1,000 mls/ hr Q5H1M IV  Last administered on 4/1/20at 18:14;  Start 3/29/20 at 

20:00;  Stop 4/2/20 at 13:08;  Status DC


Potassium Chloride 15 meq/ Bicarbonate Dialysis Soln w/ out KCl 5,007.5 ml  @ 

1,000 mls/ hr Q5H1M IV  Last administered on 4/1/20at 18:14;  Start 3/29/20 at 

20:00;  Stop 4/2/20 at 13:08;  Status DC


Potassium Chloride 15 meq/ Bicarbonate Dialysis Soln w/ out KCl 5,007.5 ml  @ 

1,000 mls/ hr Q5H1M IV  Last administered on 4/1/20at 18:14;  Start 3/29/20 at 

20:00;  Stop 4/2/20 at 13:08;  Status DC


Iohexol (Omnipaque 240 Mg/ml) 30 ml 1X  ONCE PO  Last administered on 3/30/20at 

11:30;  Start 3/30/20 at 11:30;  Stop 3/30/20 at 11:33;  Status DC


Info (CONTRAST GIVEN -- Rx MONITORING) 1 each PRN DAILY  PRN MC SEE COMMENTS;  

Start 3/30/20 at 11:45;  Stop 4/1/20 at 11:44;  Status DC


Sodium Chloride 90 meq/Potassium Chloride 15 meq/ Potassium Phosphate 18 mmol/ 

Magnesium Sulfate 8 meq/Calcium Gluconate 15 meq/ Multivitamins 10 ml/Chromium/ 

Copper/Manganese/ Seleni/Zn 0.5 ml/ Insulin Human Regular 15 unit/ Total 

Parenteral Nutrition/Amino Acids/Dextrose/ Fat Emulsion Intravenous 1,400 ml @  

58.333 mls/ hr TPN  CONT IV  Last administered on 3/30/20at 21:47;  Start 

3/30/20 at 22:00;  Stop 3/31/20 at 21:59;  Status DC


Sodium Chloride 90 meq/Potassium Chloride 15 meq/ Potassium Phosphate 18 mmol/ 

Magnesium Sulfate 8 meq/Calcium Gluconate 15 meq/ Multivitamins 10 ml/Chromium/ 

Copper/Manganese/ Seleni/Zn 0.5 ml/ Insulin Human Regular 20 unit/ Total 

Parenteral Nutrition/Amino Acids/Dextrose/ Fat Emulsion Intravenous 1,400 ml @  

58.333 mls/ hr TPN  CONT IV  Last administered on 3/31/20at 21:36;  Start 

3/31/20 at 22:00;  Stop 4/1/20 at 21:59;  Status DC


Alteplase, Recombinant (Cathflo For Central Catheter Clearance) 1 mg 1X  ONCE 

INT CAT  Last administered on 3/31/20at 20:03;  Start 3/31/20 at 19:30;  Stop 

3/31/20 at 19:46;  Status DC


Alteplase, Recombinant (Cathflo For Central Catheter Clearance) 1 mg 1X  ONCE 

INT CAT  Last administered on 3/31/20at 22:05;  Start 3/31/20 at 22:00;  Stop 

3/31/20 at 22:01;  Status DC


Sodium Chloride 90 meq/Potassium Chloride 15 meq/ Potassium Phosphate 18 mmol/ 

Magnesium Sulfate 8 meq/Calcium Gluconate 15 meq/ Multivitamins 10 ml/Chromium/ 

Copper/Manganese/ Seleni/Zn 0.5 ml/ Insulin Human Regular 20 unit/ Total 

Parenteral Nutrition/Amino Acids/Dextrose/ Fat Emulsion Intravenous 1,400 ml @  

58.333 mls/ hr TPN  CONT IV  Last administered on 4/1/20at 21:30;  Start 4/1/20 

at 22:00;  Stop 4/2/20 at 21:59;  Status DC


Dexmedetomidine HCl 400 mcg/ Sodium Chloride 100 ml @ 0 mls/hr CONT  PRN IV 

ANXIETY / AGITATION Last administered on 4/30/20at 04:38;  Start 4/2/20 at 08:15


Sodium Chloride 500 ml @  500 mls/hr 1X PRN  PRN IV ELEVATED BP, SEE COMMENTS;  

Start 4/2/20 at 08:15


Atropine Sulfate (ATROPINE 0.5mg SYRINGE) 0.5 mg PRN Q5MIN  PRN IV SEE COMMENTS;

 Start 4/2/20 at 08:15


Furosemide (Lasix) 20 mg 1X  ONCE IVP  Last administered on 4/2/20at 08:19;  

Start 4/2/20 at 08:15;  Stop 4/2/20 at 08:16;  Status DC


Lidocaine HCl (Buffered Lidocaine 1%) 3 ml STK-MED ONCE .ROUTE ;  Start 4/2/20 

at 08:39;  Stop 4/2/20 at 08:39;  Status DC


Lidocaine HCl (Buffered Lidocaine 1%) 6 ml 1X  ONCE INJ  Last administered on 

4/2/20at 09:05;  Start 4/2/20 at 09:00;  Stop 4/2/20 at 09:06;  Status DC


Sodium Chloride 90 meq/Potassium Chloride 15 meq/ Potassium Phosphate 18 mmol/ 

Magnesium Sulfate 8 meq/Calcium Gluconate 15 meq/ Multivitamins 10 ml/Chromium/ 

Copper/Manganese/ Seleni/Zn 0.5 ml/ Insulin Human Regular 20 unit/ Total 

Parenteral Nutrition/Amino Acids/Dextrose/ Fat Emulsion Intravenous 1,400 ml @  

58.333 mls/ hr TPN  CONT IV  Last administered on 4/2/20at 22:45;  Start 4/2/20 

at 22:00;  Stop 4/3/20 at 21:59;  Status DC


Sodium Chloride 1,000 ml @  1,000 mls/hr Q1H PRN IV hypotension;  Start 4/3/20 

at 07:30;  Stop 4/3/20 at 13:29;  Status DC


Albumin Human 200 ml @  200 mls/hr 1X PRN  PRN IV Hypotension Last administered 

on 4/3/20at 09:36;  Start 4/3/20 at 07:30;  Stop 4/3/20 at 13:29;  Status DC


Sodium Chloride (Normal Saline Flush) 10 ml 1X PRN  PRN IV AP catheter pack;  

Start 4/3/20 at 07:30;  Stop 4/3/20 at 21:29;  Status DC


Sodium Chloride (Normal Saline Flush) 10 ml 1X PRN  PRN IV  catheter pack;  

Start 4/3/20 at 07:30;  Stop 4/4/20 at 07:29;  Status DC


Sodium Chloride 1,000 ml @  400 mls/hr Q2H30M PRN IV PATENCY;  Start 4/3/20 at 

07:30;  Stop 4/3/20 at 19:29;  Status DC


Info (PHARMACY MONITORING -- do not chart) 1 each PRN DAILY  PRN MC SEE 

COMMENTS;  Start 4/3/20 at 07:30;  Stop 4/3/20 at 13:02;  Status DC


Info (PHARMACY MONITORING -- do not chart) 1 each PRN DAILY  PRN MC SEE 

COMMENTS;  Start 4/3/20 at 07:30;  Stop 4/5/20 at 12:45;  Status DC


Sodium Chloride 90 meq/Potassium Chloride 15 meq/ Potassium Phosphate 10 mmol/ 

Magnesium Sulfate 8 meq/Calcium Gluconate 15 meq/ Multivitamins 10 ml/Chromium/ 

Copper/Manganese/ Seleni/Zn 0.5 ml/ Insulin Human Regular 25 unit/ Total 

Parenteral Nutrition/Amino Acids/Dextrose/ Fat Emulsion Intravenous 1,400 ml @  

58.333 mls/ hr TPN  CONT IV  Last administered on 4/3/20at 22:19;  Start 4/3/20 

at 22:00;  Stop 4/4/20 at 21:59;  Status DC


Heparin Sodium (Porcine) (Heparin Sodium) 5,000 unit Q12HR SQ  Last administered

on 4/26/20at 08:59;  Start 4/3/20 at 21:00;  Stop 4/26/20 at 10:05;  Status DC


Ondansetron HCl (Zofran) 4 mg PRN Q6HRS  PRN IV NAUSEA/VOMITING;  Start 4/6/20 

at 07:00;  Stop 4/7/20 at 06:59;  Status DC


Fentanyl Citrate (Fentanyl 2ml Vial) 25 mcg PRN Q5MIN  PRN IV MILD PAIN 1-3;  

Start 4/6/20 at 07:00;  Stop 4/7/20 at 06:59;  Status DC


Fentanyl Citrate (Fentanyl 2ml Vial) 50 mcg PRN Q5MIN  PRN IV MODERATE TO SEVERE

PAIN;  Start 4/6/20 at 07:00;  Stop 4/7/20 at 06:59;  Status DC


Ringer's Solution 1,000 ml @  30 mls/hr Q24H IV ;  Start 4/6/20 at 07:00;  Stop 

4/6/20 at 18:59;  Status DC


Lidocaine HCl (Xylocaine-Mpf 1% 2ml Vial) 2 ml PRN 1X  PRN ID PRIOR TO IV START;

 Start 4/6/20 at 07:00;  Stop 4/7/20 at 06:59;  Status DC


Prochlorperazine Edisylate (Compazine) 5 mg PACU PRN  PRN IV NAUSEA, MRX1;  

Start 4/6/20 at 07:00;  Stop 4/7/20 at 06:59;  Status DC


Sodium Chloride 1,000 ml @  1,000 mls/hr Q1H PRN IV hypotension;  Start 4/4/20 

at 09:10;  Stop 4/4/20 at 15:09;  Status DC


Albumin Human 200 ml @  200 mls/hr 1X PRN  PRN IV Hypotension Last administered 

on 4/4/20at 10:10;  Start 4/4/20 at 09:15;  Stop 4/4/20 at 15:14;  Status DC


Sodium Chloride 1,000 ml @  400 mls/hr Q2H30M PRN IV PATENCY;  Start 4/4/20 at 

09:10;  Stop 4/4/20 at 21:09;  Status DC


Info (PHARMACY MONITORING -- do not chart) 1 each PRN DAILY  PRN MC SEE 

COMMENTS;  Start 4/4/20 at 09:15;  Stop 4/5/20 at 12:45;  Status DC


Info (PHARMACY MONITORING -- do not chart) 1 each PRN DAILY  PRN MC SEE 

COMMENTS;  Start 4/4/20 at 09:15;  Stop 4/5/20 at 12:45;  Status DC


Sodium Chloride 90 meq/Potassium Chloride 15 meq/ Potassium Phosphate 10 mmol/ 

Magnesium Sulfate 8 meq/Calcium Gluconate 15 meq/ Multivitamins 10 ml/Chromium/ 

Copper/Manganese/ Seleni/Zn 0.5 ml/ Insulin Human Regular 25 unit/ Total 

Parenteral Nutrition/Amino Acids/Dextrose/ Fat Emulsion Intravenous 1,400 ml @  

58.333 mls/ hr TPN  CONT IV  Last administered on 4/4/20at 22:10;  Start 4/4/20 

at 22:00;  Stop 4/5/20 at 21:59;  Status DC


Magnesium Sulfate 50 ml @ 25 mls/hr PRN DAILY  PRN IV for Mag < 1.7 on am labs 

Last administered on 4/20/20at 17:27;  Start 4/5/20 at 09:15


Sodium Chloride 90 meq/Potassium Chloride 15 meq/ Potassium Phosphate 10 mmol/ 

Magnesium Sulfate 8 meq/Calcium Gluconate 15 meq/ Multivitamins 10 ml/Chromium/ 

Copper/Manganese/ Seleni/Zn 0.5 ml/ Insulin Human Regular 25 unit/ Total 

Parenteral Nutrition/Amino Acids/Dextrose/ Fat Emulsion Intravenous 1,400 ml @  

58.333 mls/ hr TPN  CONT IV  Last administered on 4/5/20at 21:20;  Start 4/5/20 

at 22:00;  Stop 4/6/20 at 21:59;  Status DC


Sodium Chloride 1,000 ml @  1,000 mls/hr Q1H PRN IV hypotension;  Start 4/5/20 

at 12:23;  Stop 4/5/20 at 18:22;  Status DC


Albumin Human 200 ml @  200 mls/hr 1X  ONCE IV  Last administered on 4/5/20at 

13:34;  Start 4/5/20 at 12:30;  Stop 4/5/20 at 13:29;  Status DC


Diphenhydramine HCl (Benadryl) 25 mg 1X PRN  PRN IV ITCHING;  Start 4/5/20 at 

12:30;  Stop 4/6/20 at 12:29;  Status DC


Diphenhydramine HCl (Benadryl) 25 mg 1X PRN  PRN IV ITCHING;  Start 4/5/20 at 

12:30;  Stop 4/6/20 at 12:29;  Status DC


Info (PHARMACY MONITORING -- do not chart) 1 each PRN DAILY  PRN MC SEE 

COMMENTS;  Start 4/5/20 at 12:30;  Status Cancel


Bupivacaine HCl/ Epinephrine Bitart (Sensorcain-Epi 0.5%-1:086857 Mpf) 30 ml 

STK-MED ONCE .ROUTE  Last administered on 4/6/20at 11:44;  Start 4/6/20 at 

11:00;  Stop 4/6/20 at 11:01;  Status DC


Cellulose (Surgicel Fibrillar 1x2) 1 each STK-MED ONCE .ROUTE ;  Start 4/6/20 at

11:00;  Stop 4/6/20 at 11:01;  Status DC


Sodium Chloride 90 meq/Potassium Chloride 15 meq/ Potassium Phosphate 10 mmol/ M

agnesium Sulfate 12 meq/Calcium Gluconate 15 meq/ Multivitamins 10 ml/Chromium/ 

Copper/Manganese/ Seleni/Zn 0.5 ml/ Insulin Human Regular 25 unit/ Total 

Parenteral Nutrition/Amino Acids/Dextrose/ Fat Emulsion Intravenous 1,400 ml @  

58.333 mls/ hr TPN  CONT IV  Last administered on 4/6/20at 22:24;  Start 4/6/20 

at 22:00;  Stop 4/7/20 at 21:59;  Status DC


Propofol 20 ml @ As Directed STK-MED ONCE IV ;  Start 4/6/20 at 11:07;  Stop 

4/6/20 at 11:07;  Status DC


Cellulose (Surgicel Hemostat 4x8) 1 each STK-MED ONCE .ROUTE  Last administered 

on 4/6/20at 11:44;  Start 4/6/20 at 11:55;  Stop 4/6/20 at 11:56;  Status DC


Sevoflurane (Ultane) 60 ml STK-MED ONCE IH ;  Start 4/6/20 at 12:46;  Stop 

4/6/20 at 12:46;  Status DC


Sodium Chloride 1,000 ml @  1,000 mls/hr Q1H PRN IV hypotension;  Start 4/6/20 

at 13:51;  Stop 4/6/20 at 19:50;  Status DC


Albumin Human 200 ml @  200 mls/hr 1X PRN  PRN IV Hypotension Last administered 

on 4/6/20at 14:51;  Start 4/6/20 at 14:00;  Stop 4/6/20 at 19:59;  Status DC


Diphenhydramine HCl (Benadryl) 25 mg 1X PRN  PRN IV ITCHING;  Start 4/6/20 at 

14:00;  Stop 4/7/20 at 13:59;  Status DC


Diphenhydramine HCl (Benadryl) 25 mg 1X PRN  PRN IV ITCHING;  Start 4/6/20 at 

14:00;  Stop 4/7/20 at 13:59;  Status DC


Sodium Chloride 1,000 ml @  400 mls/hr Q2H30M PRN IV PATENCY;  Start 4/6/20 at 

13:51;  Stop 4/7/20 at 01:50;  Status DC


Info (PHARMACY MONITORING -- do not chart) 1 each PRN DAILY  PRN MC SEE 

COMMENTS;  Start 4/6/20 at 14:00;  Stop 4/9/20 at 08:16;  Status DC


Heparin Sodium (Porcine) (Hep Lock Adult) 500 unit STK-MED ONCE IVP ;  Start 

4/7/20 at 09:29;  Stop 4/7/20 at 09:30;  Status DC


Sodium Chloride 1,000 ml @  1,000 mls/hr Q1H PRN IV hypotension;  Start 4/7/20 

at 10:43;  Stop 4/7/20 at 16:42;  Status DC


Sodium Chloride 1,000 ml @  400 mls/hr Q2H30M PRN IV PATENCY;  Start 4/7/20 at 

10:43;  Stop 4/7/20 at 22:42;  Status DC


Info (PHARMACY MONITORING -- do not chart) 1 each PRN DAILY  PRN MC SEE 

COMMENTS;  Start 4/7/20 at 10:45;  Status UNV


Info (PHARMACY MONITORING -- do not chart) 1 each PRN DAILY  PRN MC SEE 

COMMENTS;  Start 4/7/20 at 10:45;  Status UNV


Sodium Chloride 90 meq/Potassium Chloride 15 meq/ Magnesium Sulfate 12 

meq/Calcium Gluconate 15 meq/ Multivitamins 10 ml/Chromium/ Copper/Manganese/ 

Seleni/Zn 0.5 ml/ Insulin Human Regular 25 unit/ Total Parenteral 

Nutrition/Amino Acids/Dextrose/ Fat Emulsion Intravenous 1,400 ml @  58.333 mls/

hr TPN  CONT IV  Last administered on 4/7/20at 22:13;  Start 4/7/20 at 22:00;  

Stop 4/8/20 at 21:59;  Status DC


Sodium Chloride 1,000 ml @  1,000 mls/hr Q1H PRN IV hypotension;  Start 4/8/20 

at 07:50;  Stop 4/8/20 at 13:49;  Status DC


Albumin Human 200 ml @  200 mls/hr 1X  ONCE IV ;  Start 4/8/20 at 08:00;  Stop 

4/8/20 at 08:53;  Status DC


Diphenhydramine HCl (Benadryl) 25 mg 1X PRN  PRN IV ITCHING;  Start 4/8/20 at 

08:00;  Stop 4/9/20 at 07:59;  Status DC


Diphenhydramine HCl (Benadryl) 25 mg 1X PRN  PRN IV ITCHING;  Start 4/8/20 at 08

:00;  Stop 4/9/20 at 07:59;  Status DC


Info (PHARMACY MONITORING -- do not chart) 1 each PRN DAILY  PRN MC SEE 

COMMENTS;  Start 4/8/20 at 08:00;  Stop 4/9/20 at 08:16;  Status DC


Albumin Human 50 ml @ 50 mls/hr 1X  ONCE IV ;  Start 4/8/20 at 08:53;  Stop 4 /8/20 at 08:56;  Status DC


Albumin Human 200 ml @  50 mls/hr PRN 1X  PRN IV HYPOTENSION Last administered 

on 4/14/20at 11:54;  Start 4/8/20 at 09:00


Meropenem 500 mg/ Sodium Chloride 50 ml @  100 mls/hr Q12H IV  Last administered

on 4/28/20at 10:45;  Start 4/8/20 at 10:00;  Stop 4/28/20 at 12:37;  Status DC


Sodium Chloride 90 meq/Magnesium Sulfate 12 meq/ Calcium Gluconate 15 meq/ 

Multivitamins 10 ml/Chromium/ Copper/Manganese/ Seleni/Zn 0.5 ml/ Insulin Human 

Regular 25 unit/ Total Parenteral Nutrition/Amino Acids/Dextrose/ Fat Emulsion 

Intravenous 1,400 ml @  58.333 mls/ hr TPN  CONT IV  Last administered on 

4/8/20at 21:41;  Start 4/8/20 at 22:00;  Stop 4/9/20 at 21:59;  Status DC


Sodium Chloride 1,000 ml @  1,000 mls/hr Q1H PRN IV hypotension;  Start 4/9/20 

at 07:58;  Stop 4/9/20 at 13:57;  Status DC


Albumin Human 200 ml @  200 mls/hr 1X PRN  PRN IV Hypotension Last administered 

on 4/9/20at 09:30;  Start 4/9/20 at 08:00;  Stop 4/9/20 at 13:59;  Status DC


Sodium Chloride 1,000 ml @  400 mls/hr Q2H30M PRN IV PATENCY;  Start 4/9/20 at 

07:58;  Stop 4/9/20 at 19:57;  Status DC


Info (PHARMACY MONITORING -- do not chart) 1 each PRN DAILY  PRN MC SEE 

COMMENTS;  Start 4/9/20 at 08:00;  Status Cancel


Info (PHARMACY MONITORING -- do not chart) 1 each PRN DAILY  PRN MC SEE 

COMMENTS;  Start 4/9/20 at 08:15;  Status UNV


Sodium Chloride 90 meq/Potassium Phosphate 5 mmol/ Magnesium Sulfate 12 meq/Ca

lcium Gluconate 15 meq/ Multivitamins 10 ml/Chromium/ Copper/Manganese/ 

Seleni/Zn 0.5 ml/ Insulin Human Regular 30 unit/ Total Parenteral 

Nutrition/Amino Acids/Dextrose/ Fat Emulsion Intravenous 1,400 ml @  58.333 mls/

hr TPN  CONT IV  Last administered on 4/9/20at 22:08;  Start 4/9/20 at 22:00;  

Stop 4/10/20 at 21:59;  Status DC


Linezolid/Dextrose 300 ml @  300 mls/hr Q12HR IV  Last administered on 4/20/20at

20:40;  Start 4/10/20 at 11:00;  Stop 4/21/20 at 08:10;  Status DC


Sodium Chloride 90 meq/Potassium Phosphate 15 mmol/ Magnesium Sulfate 12 

meq/Calcium Gluconate 15 meq/ Multivitamins 10 ml/Chromium/ Copper/Manganese/ 

Seleni/Zn 0.5 ml/ Insulin Human Regular 30 unit/ Total Parenteral 

Nutrition/Amino Acids/Dextrose/ Fat Emulsion Intravenous 1,400 ml @  58.333 mls/

hr TPN  CONT IV  Last administered on 4/10/20at 21:49;  Start 4/10/20 at 22:00; 

Stop 4/11/20 at 21:59;  Status DC


Sodium Chloride 90 meq/Potassium Phosphate 15 mmol/ Magnesium Sulfate 12 

meq/Calcium Gluconate 15 meq/ Multivitamins 10 ml/Chromium/ Copper/Manganese/ 

Seleni/Zn 0.5 ml/ Insulin Human Regular 40 unit/ Total Parenteral 

Nutrition/Amino Acids/Dextrose/ Fat Emulsion Intravenous 1,400 ml @  58.333 mls/

hr TPN  CONT IV  Last administered on 4/11/20at 21:21;  Start 4/11/20 at 22:00; 

Stop 4/12/20 at 21:59;  Status DC


Sodium Chloride 1,000 ml @  1,000 mls/hr Q1H PRN IV hypotension;  Start 4/11/20 

at 13:26;  Stop 4/11/20 at 19:25;  Status DC


Albumin Human 200 ml @  200 mls/hr 1X PRN  PRN IV Hypotension Last administered 

on 4/11/20at 15:00;  Start 4/11/20 at 13:30;  Stop 4/11/20 at 19:29;  Status DC


Sodium Chloride (Normal Saline Flush) 10 ml 1X PRN  PRN IV AP catheter pack;  

Start 4/11/20 at 13:30;  Stop 4/12/20 at 13:29;  Status DC


Sodium Chloride (Normal Saline Flush) 10 ml 1X PRN  PRN IV  catheter pack;  

Start 4/11/20 at 13:30;  Stop 4/12/20 at 13:29;  Status DC


Sodium Chloride 1,000 ml @  400 mls/hr Q2H30M PRN IV PATENCY;  Start 4/11/20 at 

13:26;  Stop 4/12/20 at 01:25;  Status DC


Info (PHARMACY MONITORING -- do not chart) 1 each PRN DAILY  PRN MC SEE 

COMMENTS;  Start 4/11/20 at 13:30;  Stop 4/11/20 at 13:33;  Status DC


Info (PHARMACY MONITORING -- do not chart) 1 each PRN DAILY  PRN MC SEE 

COMMENTS;  Start 4/11/20 at 13:30;  Stop 4/11/20 at 13:34;  Status DC


Sodium Chloride 90 meq/Potassium Phosphate 19 mmol/ Magnesium Sulfate 12 

meq/Calcium Gluconate 15 meq/ Multivitamins 10 ml/Chromium/ Copper/Manganese/ 

Seleni/Zn 0.5 ml/ Insulin Human Regular 40 unit/ Total Parenteral 

Nutrition/Amino Acids/Dextrose/ Fat Emulsion Intravenous 1,400 ml @  58.333 mls/

hr TPN  CONT IV  Last administered on 4/12/20at 21:54;  Start 4/12/20 at 22:00; 

Stop 4/13/20 at 21:59;  Status DC


Sodium Chloride 1,000 ml @  1,000 mls/hr Q1H PRN IV hypotension;  Start 4/13/20 

at 09:35;  Stop 4/13/20 at 15:34;  Status DC


Albumin Human 200 ml @  200 mls/hr 1X PRN  PRN IV Hypotension;  Start 4/13/20 at

09:45;  Stop 4/13/20 at 15:44;  Status DC


Diphenhydramine HCl (Benadryl) 25 mg 1X PRN  PRN IV ITCHING;  Start 4/13/20 at 

09:45;  Stop 4/14/20 at 09:44;  Status DC


Diphenhydramine HCl (Benadryl) 25 mg 1X PRN  PRN IV ITCHING;  Start 4/13/20 at 

09:45;  Stop 4/14/20 at 09:44;  Status DC


Sodium Chloride 1,000 ml @  400 mls/hr Q2H30M PRN IV PATENCY;  Start 4/13/20 at 

09:35;  Stop 4/13/20 at 21:34;  Status DC


Info (PHARMACY MONITORING -- do not chart) 1 each PRN DAILY  PRN MC SEE 

COMMENTS;  Start 4/13/20 at 09:45;  Status Cancel


Sodium Chloride 100 meq/Potassium Phosphate 19 mmol/ Magnesium Sulfate 12 

meq/Calcium Gluconate 15 meq/ Multivitamins 10 ml/Chromium/ Copper/Manganese/ 

Seleni/Zn 0.5 ml/ Insulin Human Regular 40 unit/ Potassium Chloride 20 meq/ 

Total Parenteral Nutrition/Amino Acids/Dextrose/ Fat Emulsion Intravenous 1,400 

ml @  58.333 mls/ hr TPN  CONT IV  Last administered on 4/13/20at 22:02;  Start 

4/13/20 at 22:00;  Stop 4/14/20 at 21:59;  Status DC


Furosemide (Lasix) 40 mg 1X  ONCE IVP  Last administered on 4/13/20at 14:39;  

Start 4/13/20 at 14:30;  Stop 4/13/20 at 14:31;  Status DC


Metronidazole 100 ml @  100 mls/hr Q8HRS IV  Last administered on 4/21/20at 

06:04;  Start 4/14/20 at 10:00;  Stop 4/21/20 at 08:10;  Status DC


Sodium Chloride 1,000 ml @  1,000 mls/hr Q1H PRN IV hypotension;  Start 4/14/20 

at 08:00;  Stop 4/14/20 at 13:59;  Status DC


Albumin Human 200 ml @  200 mls/hr 1X PRN  PRN IV Hypotension;  Start 4/14/20 at

08:00;  Stop 4/14/20 at 13:59;  Status DC


Sodium Chloride 1,000 ml @  400 mls/hr Q2H30M PRN IV PATENCY;  Start 4/14/20 at 

08:00;  Stop 4/14/20 at 19:59;  Status DC


Info (PHARMACY MONITORING -- do not chart) 1 each PRN DAILY  PRN MC SEE 

COMMENTS;  Start 4/14/20 at 11:30;  Status UNV


Info (PHARMACY MONITORING -- do not chart) 1 each PRN DAILY  PRN MC SEE 

COMMENTS;  Start 4/14/20 at 11:30;  Stop 4/16/20 at 12:13;  Status DC


Sodium Chloride 100 meq/Potassium Phosphate 19 mmol/ Magnesium Sulfate 12 

meq/Calcium Gluconate 15 meq/ Multivitamins 10 ml/Chromium/ Copper/Manganese/ 

Seleni/Zn 0.5 ml/ Insulin Human Regular 40 unit/ Potassium Chloride 20 meq/ 

Total Parenteral Nutrition/Amino Acids/Dextrose/ Fat Emulsion Intravenous 1,400 

ml @  58.333 mls/ hr TPN  CONT IV  Last administered on 4/14/20at 21:52;  Start 

4/14/20 at 22:00;  Stop 4/15/20 at 21:59;  Status DC


Sodium Chloride (Normal Saline Flush) 10 ml QSHIFT  PRN IV AFTER MEDS AND BLOOD 

DRAWS;  Start 4/14/20 at 15:00


Sodium Chloride (Normal Saline Flush) 10 ml PRN Q5MIN  PRN IV AFTER MEDS AND 

BLOOD DRAWS;  Start 4/14/20 at 15:00


Sodium Chloride (Normal Saline Flush) 20 ml PRN Q5MIN  PRN IV AFTER MEDS AND 

BLOOD DRAWS;  Start 4/14/20 at 15:00


Sodium Chloride 100 meq/Potassium Phosphate 19 mmol/ Magnesium Sulfate 12 

meq/Calcium Gluconate 15 meq/ Multivitamins 10 ml/Chromium/ Copper/Manganese/ 

Seleni/Zn 0.5 ml/ Insulin Human Regular 40 unit/ Potassium Chloride 20 meq/ 

Total Parenteral Nutrition/Amino Acids/Dextrose/ Fat Emulsion Intravenous 1,400 

ml @  58.333 mls/ hr TPN  CONT IV  Last administered on 4/15/20at 21:20;  Start 

4/15/20 at 22:00;  Stop 4/16/20 at 21:59;  Status DC


Lidocaine HCl (Buffered Lidocaine 1%) 3 ml STK-MED ONCE .ROUTE ;  Start 4/15/20 

at 13:16;  Stop 4/15/20 at 13:16;  Status DC


Lidocaine HCl (Buffered Lidocaine 1%) 6 ml 1X  ONCE INJ  Last administered on 

4/15/20at 13:45;  Start 4/15/20 at 13:30;  Stop 4/15/20 at 13:31;  Status DC


Albumin Human 100 ml @  100 mls/hr 1X  ONCE IV  Last administered on 4/15/20at 

15:41;  Start 4/15/20 at 15:00;  Stop 4/15/20 at 15:59;  Status DC


Albumin Human 50 ml @ 50 mls/hr 1X  ONCE IV  Last administered on 4/15/20at 

15:00;  Start 4/15/20 at 15:00;  Stop 4/15/20 at 15:59;  Status DC


Info (PHARMACY MONITORING -- do not chart) 1 each PRN DAILY  PRN MC SEE 

COMMENTS;  Start 4/16/20 at 11:30;  Status Cancel


Info (PHARMACY MONITORING -- do not chart) 1 each PRN DAILY  PRN MC SEE 

COMMENTS;  Start 4/16/20 at 11:30;  Status UNV


Sodium Chloride 100 meq/Potassium Phosphate 10 mmol/ Magnesium Sulfate 12 

meq/Calcium Gluconate 15 meq/ Multivitamins 10 ml/Chromium/ Copper/Manganese/ 

Seleni/Zn 0.5 ml/ Insulin Human Regular 35 unit/ Potassium Chloride 20 meq/ 

Total Parenteral Nutrition/Amino Acids/Dextrose/ Fat Emulsion Intravenous 1,400 

ml @  58.333 mls/ hr TPN  CONT IV  Last administered on 4/16/20at 22:10;  Start 

4/16/20 at 22:00;  Stop 4/17/20 at 21:59;  Status DC


Sodium Chloride 100 meq/Potassium Phosphate 5 mmol/ Magnesium Sulfate 12 

meq/Calcium Gluconate 15 meq/ Multivitamins 10 ml/Chromium/ Copper/Manganese/ 

Seleni/Zn 0.5 ml/ Insulin Human Regular 35 unit/ Potassium Chloride 20 meq/ 

Total Parenteral Nutrition/Amino Acids/Dextrose/ Fat Emulsion Intravenous 1,400 

ml @  58.333 mls/ hr TPN  CONT IV  Last administered on 4/17/20at 22:59;  Start 

4/17/20 at 22:00;  Stop 4/18/20 at 21:59;  Status DC


Sodium Chloride 1,000 ml @  1,000 mls/hr Q1H PRN IV hypotension;  Start 4/18/20 

at 08:27;  Stop 4/18/20 at 14:26;  Status DC


Albumin Human 200 ml @  200 mls/hr 1X PRN  PRN IV Hypotension Last administered 

on 4/18/20at 09:18;  Start 4/18/20 at 08:30;  Stop 4/18/20 at 14:29;  Status DC


Sodium Chloride 1,000 ml @  400 mls/hr Q2H30M PRN IV PATENCY;  Start 4/18/20 at 

08:27;  Stop 4/18/20 at 20:26;  Status DC


Info (PHARMACY MONITORING -- do not chart) 1 each PRN DAILY  PRN MC SEE 

COMMENTS;  Start 4/18/20 at 08:30;  Status Cancel


Info (PHARMACY MONITORING -- do not chart) 1 each PRN DAILY  PRN MC SEE 

COMMENTS;  Start 4/18/20 at 08:30;  Stop 4/26/20 at 13:10;  Status DC


Sodium Chloride 100 meq/Potassium Chloride 40 meq/ Magnesium Sulfate 15 

meq/Calcium Gluconate 15 meq/ Multivitamins 10 ml/Chromium/ Copper/Manganese/ 

Seleni/Zn 0.5 ml/ Insulin Human Regular 35 unit/ Total Parenteral Nutriti

on/Amino Acids/Dextrose/ Fat Emulsion Intravenous 1,400 ml @  58.333 mls/ hr TPN

 CONT IV  Last administered on 4/18/20at 22:00;  Start 4/18/20 at 22:00;  Stop 

4/19/20 at 21:59;  Status DC


Potassium Chloride/Water 100 ml @  100 mls/hr 1X  ONCE IV  Last administered on 

4/18/20at 17:28;  Start 4/18/20 at 14:45;  Stop 4/18/20 at 15:44;  Status DC


Sodium Chloride 100 meq/Potassium Chloride 40 meq/ Magnesium Sulfate 15 

meq/Calcium Gluconate 15 meq/ Multivitamins 10 ml/Chromium/ Copper/Manganese/ 

Seleni/Zn 0.5 ml/ Insulin Human Regular 35 unit/ Total Parenteral 

Nutrition/Amino Acids/Dextrose/ Fat Emulsion Intravenous 1,400 ml @  58.333 mls/

hr TPN  CONT IV  Last administered on 4/19/20at 22:46;  Start 4/19/20 at 22:00; 

Stop 4/20/20 at 21:59;  Status DC


Sodium Chloride 100 meq/Potassium Chloride 40 meq/ Magnesium Sulfate 20 

meq/Calcium Gluconate 15 meq/ Multivitamins 10 ml/Chromium/ Copper/Manganese/ 

Seleni/Zn 0.5 ml/ Insulin Human Regular 35 unit/ Total Parenteral 

Nutrition/Amino Acids/Dextrose/ Fat Emulsion Intravenous 1,400 ml @  58.333 mls/

hr TPN  CONT IV  Last administered on 4/20/20at 22:31;  Start 4/20/20 at 22:00; 

Stop 4/21/20 at 21:59;  Status DC


Fentanyl Citrate (Fentanyl 2ml Vial) 50 mcg PRN Q2HR  PRN IVP PAIN Last 

administered on 4/27/20at 13:32;  Start 4/20/20 at 21:00;  Stop 4/28/20 at 

12:53;  Status DC


Fentanyl Citrate (Fentanyl 2ml Vial) 25 mcg PRN Q2HR  PRN IVP PAIN;  Start 

4/20/20 at 21:00;  Stop 4/28/20 at 12:54;  Status DC


Enoxaparin Sodium (Lovenox 100mg Syringe) 100 mg Q12HR SQ ;  Start 4/21/20 at 

21:00;  Status UNV


Amino Acids/ Glycerin/ Electrolytes 1,000 ml @  75 mls/hr [id number] IV ;  Start 

4/20/20 at 21:15;  Status UNV


Sodium Chloride 1,000 ml @  1,000 mls/hr Q1H PRN IV hypotension;  Start 4/21/20 

at 07:56;  Stop 4/21/20 at 13:55;  Status DC


Albumin Human 200 ml @  200 mls/hr 1X PRN  PRN IV Hypotension Last administered 

on 4/21/20at 08:40;  Start 4/21/20 at 08:00;  Stop 4/21/20 at 13:59;  Status DC


Sodium Chloride 1,000 ml @  400 mls/hr Q2H30M PRN IV PATENCY;  Start 4/21/20 at 

07:56;  Stop 4/21/20 at 19:55;  Status DC


Info (PHARMACY MONITORING -- do not chart) 1 each PRN DAILY  PRN MC SEE 

COMMENTS;  Start 4/21/20 at 08:00;  Status UNV


Info (PHARMACY MONITORING -- do not chart) 1 each PRN DAILY  PRN MC SEE 

COMMENTS;  Start 4/21/20 at 08:00;  Status UNV


Daptomycin 430 mg/ Sodium Chloride 50 ml @  100 mls/hr Q24H IV  Last 

administered on 4/21/20at 12:35;  Start 4/21/20 at 09:00;  Stop 4/21/20 at 

12:49;  Status DC


Sodium Chloride 100 meq/Potassium Chloride 40 meq/ Magnesium Sulfate 20 

meq/Calcium Gluconate 15 meq/ Multivitamins 10 ml/Chromium/ Copper/Manganese/ 

Seleni/Zn 0.5 ml/ Insulin Human Regular 35 unit/ Total Parenteral 

Nutrition/Amino Acids/Dextrose/ Fat Emulsion Intravenous 1,400 ml @  58.333 mls/

hr TPN  CONT IV  Last administered on 4/21/20at 21:26;  Start 4/21/20 at 22:00; 

Stop 4/22/20 at 21:59;  Status DC


Daptomycin 430 mg/ Sodium Chloride 50 ml @  100 mls/hr Q48H IV ;  Start 4/23/20 

at 09:00;  Stop 4/22/20 at 11:55;  Status DC


Sodium Chloride 100 meq/Potassium Chloride 40 meq/ Magnesium Sulfate 20 

meq/Calcium Gluconate 15 meq/ Multivitamins 10 ml/Chromium/ Copper/Manganese/ 

Seleni/Zn 0.5 ml/ Insulin Human Regular 35 unit/ Total Parenteral 

Nutrition/Amino Acids/Dextrose/ Fat Emulsion Intravenous 1,400 ml @  58.333 mls/

hr TPN  CONT IV  Last administered on 4/22/20at 22:27;  Start 4/22/20 at 22:00; 

Stop 4/23/20 at 21:59;  Status DC


Daptomycin 430 mg/ Sodium Chloride 50 ml @  100 mls/hr Q24H IV  Last 

administered on 4/24/20at 15:07;  Start 4/22/20 at 13:00;  Stop 4/25/20 at 

13:15;  Status DC


Sodium Chloride 100 meq/Potassium Chloride 40 meq/ Magnesium Sulfate 20 

meq/Calcium Gluconate 10 meq/ Multivitamins 10 ml/Chromium/ Copper/Manganese/ 

Seleni/Zn 0.5 ml/ Insulin Human Regular 35 unit/ Total Parenteral 

Nutrition/Amino Acids/Dextrose/ Fat Emulsion Intravenous 1,400 ml @  58.333 mls/

hr TPN  CONT IV  Last administered on 4/24/20at 00:06;  Start 4/23/20 at 22:00; 

Stop 4/24/20 at 21:59;  Status DC


Alteplase, Recombinant (Cathflo For Central Catheter Clearance) 1 mg 1X  ONCE 

INT CAT  Last administered on 4/24/20at 11:44;  Start 4/24/20 at 10:45;  Stop 

4/24/20 at 10:46;  Status DC


Ondansetron HCl (Zofran) 4 mg PRN Q6HRS  PRN IV NAUSEA/VOMITING;  Start 4/27/20 

at 07:00;  Stop 4/28/20 at 06:59;  Status DC


Fentanyl Citrate (Fentanyl 2ml Vial) 25 mcg PRN Q5MIN  PRN IV MILD PAIN 1-3;  

Start 4/27/20 at 07:00;  Stop 4/28/20 at 06:59;  Status DC


Fentanyl Citrate (Fentanyl 2ml Vial) 50 mcg PRN Q5MIN  PRN IV MODERATE TO SEVERE

PAIN Last administered on 4/27/20at 10:17;  Start 4/27/20 at 07:00;  Stop 

4/28/20 at 06:59;  Status DC


Ringer's Solution 1,000 ml @  30 mls/hr Q24H IV ;  Start 4/27/20 at 07:00;  Stop

4/27/20 at 18:59;  Status DC


Lidocaine HCl (Xylocaine-Mpf 1% 2ml Vial) 2 ml PRN 1X  PRN ID PRIOR TO IV START;

 Start 4/27/20 at 07:00;  Stop 4/28/20 at 06:59;  Status DC


Prochlorperazine Edisylate (Compazine) 5 mg PACU PRN  PRN IV NAUSEA, MRX1;  

Start 4/27/20 at 07:00;  Stop 4/28/20 at 06:59;  Status DC


Sodium Acetate 50 meq/Potassium Acetate 55 meq/ Magnesium Sulfate 20 meq/Calcium

Gluconate 10 meq/ Multivitamins 10 ml/Chromium/ Copper/Manganese/ Seleni/Zn 0.5 

ml/ Insulin Human Regular 35 unit/ Total Parenteral Nutrition/Amino Acid

s/Dextrose/ Fat Emulsion Intravenous 1,400 ml @  58.333 mls/ hr TPN  CONT IV ;  

Start 4/24/20 at 22:00;  Stop 4/24/20 at 14:15;  Status DC


Sodium Acetate 50 meq/Potassium Acetate 55 meq/ Magnesium Sulfate 20 meq/Calcium

Gluconate 10 meq/ Multivitamins 10 ml/Chromium/ Copper/Manganese/ Seleni/Zn 0.5 

ml/ Insulin Human Regular 35 unit/ Total Parenteral Nutrition/Amino 

Acids/Dextrose/ Fat Emulsion Intravenous 1,800 ml @  75 mls/hr TPN  CONT IV  

Last administered on 4/24/20at 22:38;  Start 4/24/20 at 22:00;  Stop 4/25/20 at 

21:59;  Status DC


Sodium Chloride 1,000 ml @  1,000 mls/hr Q1H PRN IV hypotension;  Start 4/24/20 

at 15:31;  Stop 4/24/20 at 21:30;  Status DC


Diphenhydramine HCl (Benadryl) 25 mg 1X PRN  PRN IV ITCHING;  Start 4/24/20 at 

15:45;  Stop 4/25/20 at 15:44;  Status DC


Diphenhydramine HCl (Benadryl) 25 mg 1X PRN  PRN IV ITCHING;  Start 4/24/20 at 

15:45;  Stop 4/25/20 at 15:44;  Status DC


Sodium Chloride 1,000 ml @  400 mls/hr Q2H30M PRN IV PATENCY;  Start 4/24/20 at 

15:31;  Stop 4/25/20 at 03:30;  Status DC


Info (PHARMACY MONITORING -- do not chart) 1 each PRN DAILY  PRN MC SEE 

COMMENTS;  Start 4/24/20 at 15:45


Sodium Acetate 50 meq/Potassium Acetate 55 meq/ Magnesium Sulfate 20 meq/Calcium

Gluconate 10 meq/ Multivitamins 10 ml/Chromium/ Copper/Manganese/ Seleni/Zn 0.5 

ml/ Insulin Human Regular 35 unit/ Total Parenteral Nutrition/Amino 

Acids/Dextrose/ Fat Emulsion Intravenous 1,800 ml @  75 mls/hr TPN  CONT IV  

Last administered on 4/25/20at 22:03;  Start 4/25/20 at 22:00;  Stop 4/26/20 at 

21:59;  Status DC


Daptomycin 430 mg/ Sodium Chloride 50 ml @  100 mls/hr Q24H IV  Last 

administered on 4/29/20at 12:26;  Start 4/25/20 at 13:00


Heparin Sodium (Porcine) 1000 unit/Sodium Chloride 1,001 ml @  1,001 mls/hr 1X  

ONCE IRR ;  Start 4/27/20 at 06:00;  Stop 4/27/20 at 06:59;  Status DC


Potassium Acetate 55 meq/Magnesium Sulfate 20 meq/ Calcium Gluconate 10 meq/ 

Multivitamins 10 ml/Chromium/ Copper/Manganese/ Seleni/Zn 0.5 ml/ Insulin Human 

Regular 35 unit/ Total Parenteral Nutrition/Amino Acids/Dextrose/ Fat Emulsion 

Intravenous 1,920 ml @  80 mls/hr TPN  CONT IV  Last administered on 4/26/20at 

22:10;  Start 4/26/20 at 22:00;  Stop 4/27/20 at 21:59;  Status DC


Dexamethasone Sodium Phosphate (Decadron) 4 mg STK-MED ONCE .ROUTE ;  Start 

4/27/20 at 10:56;  Stop 4/27/20 at 10:57;  Status DC


Ondansetron HCl (Zofran) 4 mg STK-MED ONCE .ROUTE ;  Start 4/27/20 at 10:56;  

Stop 4/27/20 at 10:57;  Status DC


Rocuronium Bromide (Zemuron) 50 mg STK-MED ONCE .ROUTE ;  Start 4/27/20 at 

10:56;  Stop 4/27/20 at 10:57;  Status DC


Fentanyl Citrate (Fentanyl 2ml Vial) 100 mcg STK-MED ONCE .ROUTE ;  Start 

4/27/20 at 10:56;  Stop 4/27/20 at 10:57;  Status DC


Bupivacaine HCl/ Epinephrine Bitart (Sensorcain-Epi 0.5%-1:120517 Mpf) 30 ml 

STK-MED ONCE .ROUTE  Last administered on 4/27/20at 12:01;  Start 4/27/20 at 

10:58;  Stop 4/27/20 at 10:58;  Status DC


Cellulose (Surgicel Hemostat 2x14) 1 each STK-MED ONCE .ROUTE ;  Start 4/27/20 

at 10:58;  Stop 4/27/20 at 10:59;  Status DC


Iohexol (Omnipaque 300 Mg/ml) 50 ml STK-MED ONCE .ROUTE ;  Start 4/27/20 at 

10:58;  Stop 4/27/20 at 10:59;  Status DC


Cellulose (Surgicel Hemostat 4x8) 1 each STK-MED ONCE .ROUTE ;  Start 4/27/20 at

10:58;  Stop 4/27/20 at 10:59;  Status DC


Bisacodyl (Dulcolax Supp) 10 mg STK-MED ONCE .ROUTE ;  Start 4/27/20 at 10:59;  

Stop 4/27/20 at 10:59;  Status DC


Heparin Sodium (Porcine) 1000 unit/Sodium Chloride 1,001 ml @  1,001 mls/hr 1X  

ONCE IRR ;  Start 4/27/20 at 12:00;  Stop 4/27/20 at 12:59;  Status DC


Propofol 20 ml @ As Directed STK-MED ONCE IV ;  Start 4/27/20 at 11:05;  Stop 

4/27/20 at 11:05;  Status DC


Sevoflurane (Ultane) 90 ml STK-MED ONCE IH ;  Start 4/27/20 at 11:05;  Stop 

4/27/20 at 11:05;  Status DC


Sevoflurane (Ultane) 60 ml STK-MED ONCE IH ;  Start 4/27/20 at 12:26;  Stop 

4/27/20 at 12:27;  Status DC


Propofol 20 ml @ As Directed STK-MED ONCE IV ;  Start 4/27/20 at 12:26;  Stop 

4/27/20 at 12:27;  Status DC


Phenylephrine HCl (PHENYLEPHRINE in 0.9% NACL PF) 1 mg STK-MED ONCE IV ;  Start 

4/27/20 at 12:34;  Stop 4/27/20 at 12:34;  Status DC


Heparin Sodium (Porcine) (Heparin Sodium) 5,000 unit Q12HR SQ  Last administered

on 4/29/20at 22:20;  Start 4/27/20 at 21:00


Sodium Chloride (Normal Saline Flush) 3 ml QSHIFT  PRN IV AFTER MEDS AND BLOOD 

DRAWS;  Start 4/27/20 at 13:45


Naloxone HCl (Narcan) 0.4 mg PRN Q2MIN  PRN IV SEE INSTRUCTIONS;  Start 4/27/20 

at 13:45


Sodium Chloride 1,000 ml @  25 mls/hr Q24H IV  Last administered on 4/30/20at 

01:06;  Start 4/27/20 at 13:37


Naloxone HCl (Narcan) 0.4 mg PRN Q2MIN  PRN IV SEE INSTRUCTIONS;  Start 4/27/20 

at 14:30;  Status UNV


Sodium Chloride 1,000 ml @  25 mls/hr Q24H IV ;  Start 4/27/20 at 14:30;  Status

UNV


Hydromorphone HCl 30 ml @ 0 mls/hr CONT PRN  PRN IV PER PROTOCOL Last 

administered on 4/29/20at 11:28;  Start 4/27/20 at 14:30


Potassium Acetate 55 meq/Magnesium Sulfate 20 meq/ Calcium Gluconate 10 meq/ 

Multivitamins 10 ml/Chromium/ Copper/Manganese/ Seleni/Zn 0.5 ml/ Insulin Human 

Regular 35 unit/ Total Parenteral Nutrition/Amino Acids/Dextrose/ Fat Emulsion 

Intravenous 1,920 ml @  80 mls/hr TPN  CONT IV  Last administered on 4/27/20at 

22:01;  Start 4/27/20 at 22:00;  Stop 4/28/20 at 21:59;  Status DC


Bumetanide (Bumex) 2 mg [id number] IV  Last administered on 4/29/20at 14:53;  Start 

4/28/20 at 14:00


Meropenem 1 gm/ Sodium Chloride 100 ml @  200 mls/hr Q8HRS IV  Last administered

on 4/30/20at 06:23;  Start 4/28/20 at 14:00


Potassium Acetate 55 meq/Magnesium Sulfate 20 meq/ Calcium Gluconate 10 meq/ 

Multivitamins 10 ml/Chromium/ Copper/Manganese/ Seleni/Zn 0.5 ml/ Insulin Human 

Regular 35 unit/ Total Parenteral Nutrition/Amino Acids/Dextrose/ Fat Emulsion 

Intravenous 1,920 ml @  80 mls/hr TPN  CONT IV  Last administered on 4/28/20at 

22:02;  Start 4/28/20 at 22:00;  Stop 4/29/20 at 21:59;  Status DC


Hydromorphone HCl (Dilaudid Standard PCA) 12 mg STK-MED ONCE IV ;  Start 4/27/20

at 14:35;  Stop 4/28/20 at 13:53;  Status DC


Artificial Tears (Artificial Tears) 1 drop PRN Q15MIN  PRN OU DRY EYE Last 

administered on 4/30/20at 01:04;  Start 4/29/20 at 05:30


Hydromorphone HCl (Dilaudid Standard PCA) 12 mg STK-MED ONCE IV ;  Start 4/28/20

at 12:05;  Stop 4/29/20 at 09:15;  Status DC


Potassium Acetate 65 meq/Magnesium Sulfate 20 meq/ Calcium Gluconate 10 meq/ 

Multivitamins 10 ml/Chromium/ Copper/Manganese/ Seleni/Zn 0.5 ml/ Insulin Human 

Regular 30 unit/ Total Parenteral Nutrition/Amino Acids/Dextrose/ Fat Emulsion 

Intravenous 1,920 ml @  80 mls/hr TPN  CONT IV  Last administered on 4/29/20at 

22:22;  Start 4/29/20 at 22:00;  Stop 4/30/20 at 21:59





Active Scripts


Active


Reported


Bisoprolol Fumarate 5 Mg Tablet 10 Mg PO DAILY


Vitals/I & O





Vital Sign - Last 24 Hours








 4/29/20 4/29/20 4/29/20 4/29/20





 07:57 08:00 08:00 09:00


 


Temp  99.3  





  99.3  


 


Pulse  86  104


 


Resp  17  19


 


B/P (MAP)  140/86 (104)  155/74 (101)


 


Pulse Ox 98 98  99


 


O2 Delivery Ventilator Ventilator Mechanical Ventilator Ventilator


 


    





    





 4/29/20 4/29/20 4/29/20 4/29/20





 10:00 11:00 11:28 11:34


 


Pulse 122 83  


 


Resp 21 18  


 


B/P (MAP) 105/51 (69) 90/61 (71)  


 


Pulse Ox 99 98 97 98


 


O2 Delivery Ventilator Ventilator Ventilator Ventilator





 4/29/20 4/29/20 4/29/20 4/29/20





 11:58 12:00 12:00 12:21


 


Temp   100.0 





   100.0 


 


Pulse   141 


 


Resp   38 


 


B/P (MAP)   159/68 (98) 


 


Pulse Ox 100  100 98


 


O2 Delivery Ventilator Mechanical Ventilator Ventilator Ventilator


 


    





    





 4/29/20 4/29/20 4/29/20 4/29/20





 13:00 14:00 14:35 15:00


 


Pulse 100 90  138


 


Resp 25 40  46


 


B/P (MAP) 105/56 (72) 130/70 (90)  197/67 (110)


 


Pulse Ox 96 98 96 95


 


O2 Delivery Ventilator Ventilator Tracheal Collar Tracheal Collar





 4/29/20 4/29/20 4/29/20 4/29/20





 15:32 15:35 16:00 16:00


 


Temp    98.4





    98.4


 


Pulse 133   90


 


Resp 26 26


 


B/P (MAP) 137/67 (90)   101/56 (71)


 


Pulse Ox 97 98  98


 


O2 Delivery Ventilator Ventilator Mechanical Ventilator Ventilator


 


    





    





 4/29/20 4/29/20 4/29/20 4/29/20





 17:00 18:00 19:00 20:00


 


Pulse 88 88 110 


 


Resp 26 18 18 


 


B/P (MAP) 99/55 (70) 101/59 (73) 119/50 (73) 


 


Pulse Ox 100 100 99 


 


O2 Delivery Ventilator Ventilator cpap trial Mechanical Ventilator





 4/29/20 4/29/20 4/29/20 4/29/20





 20:00 20:29 21:00 22:00


 


Temp 99.1   





 99.1   


 


Pulse 98  94 84


 


Resp 20  26 20


 


B/P (MAP) 126/57 (80)  123/65 (84) 123/54 (77)


 


Pulse Ox 99 97 99 100


 


O2 Delivery cpap trial Ventilator cpap trial cpap trial


 


    





    





 4/29/20 4/30/20 4/30/20 4/30/20





 23:00 00:00 00:00 00:30


 


Temp   99.4 





   99.4 


 


Pulse 78  80 


 


Resp 18  23 


 


B/P (MAP) 109/54 (72)  105/55 (72) 


 


Pulse Ox 99  100 99


 


O2 Delivery cpap trial Mechanical Ventilator cpap trial Ventilator


 


    





    





 4/30/20 4/30/20 4/30/20 4/30/20





 00:31 01:00 02:00 03:00


 


Pulse  82 80 80


 


Resp  18 19 19


 


B/P (MAP)  110/57 (74) 98/47 (64) 100/52 (68)


 


Pulse Ox 100 100 99 100


 


O2 Delivery Ventilator Ventilator Ventilator Ventilator





 4/30/20 4/30/20 4/30/20 4/30/20





 04:00 04:00 04:21 05:00


 


Temp  97.6  





  97.6  


 


Pulse  80  80


 


Resp  18  18


 


B/P (MAP)  108/61 (77)  99/56 (70)


 


Pulse Ox  100 99 98


 


O2 Delivery Mechanical Ventilator Ventilator Ventilator Ventilator


 


    





    





 4/30/20 4/30/20  





 06:00 07:00  


 


Pulse 80 78  


 


Resp 18 19  


 


B/P (MAP) 99/54 (69) 87/49 (62)  


 


Pulse Ox 99 98  


 


O2 Delivery Ventilator Ventilator  














Intake and Output   


 


 4/29/20 4/29/20 4/30/20





 15:00 23:00 07:00


 


Intake Total 50 ml 1444 ml 0 ml


 


Output Total 2550 ml 1670 ml 1390 ml


 


Balance -2500 ml -226 ml -1390 ml











Hemodynamically unstable?:  No


Is patient in severe pain?:  No


Is NPO status required?:  Yes











GAETANO GONCALVES MD        Apr 30, 2020 07:52

## 2020-04-30 NOTE — NUR
Pharmacy TPN Dosing Note



S: JESENIA RICH is a 49 year old F Currently receiving Central Continuous TPN started 
03/18/20



B:Pertinent PMH: 

Necrotizing pancreatitis

Height: 5 feet, 8 inches

Weight: 110.5 kg



Current diet: NPO 



LABS:

Sodium:    152 

Potassium: 3.6 

Chloride:  113 

Calcium:   8.5 

Corrected Calcium: 9.54 

Magnesium: 2 

CO2:       33 

SCr:       1.1 

Glucose:   119 

Albumin:   2.7 

AST:       23 

ALT:       11 



TPN FORMULA:

TPN TYPE:  Central Continuous

AMINO ACIDS:         125 gm

DEXTROSE:            225 gm

LIPIDS:              20 gm

SODIUM CHLORIDE:     - mEq

SODIUM ACETATE:      - mEq

SODIUM PHOSPHATE:    - mmol

POTASSIUM CHLORIDE:  - mEq

POTASSIUM ACETATE:   55 mEq

POTASSIUM PHOSPHATE: - mmol

MAGNESIUM:           20 mEq

CALCIUM:             10 mEq

INSULIN:             30 units

MULTIPLE VITAMIN:    10 ml

TRACE ELEMENTS:      0.5 ml(s)



TPN PLAN:  

decrease kacetate to 55 meq/bag.





R: Continue TPN at 80 ml/hr

Will monitor electrolytes, glucose, and tolerance to TPN.



 YENIFER STREETER RPH, 04/30/20 1106Pharmacy TPN Dosing Note



S: JESENIA RICH is a 49 year old F Currently receiving Central Continuous TPN started 
03/18/20



B:Pertinent PMH: 

Necrotizing pancreatitis

Height: 5 feet, 8 inches

Weight: 110.5 kg



Current diet: NPO 



LABS:

Sodium:    152 

Potassium: 3.6 

Chloride:  113 

Calcium:   8.5 

Corrected Calcium: 9.54 

Magnesium: 2 

CO2:       33 

SCr:       1.1 

Glucose:   119 

Albumin:   2.7 

AST:       23 

ALT:       11 



TPN FORMULA:

TPN TYPE:  Central Continuous

AMINO ACIDS:         125 gm

DEXTROSE:            225 gm

LIPIDS:              20 gm

SODIUM CHLORIDE:     - mEq

SODIUM ACETATE:      - mEq

SODIUM PHOSPHATE:    - mmol

POTASSIUM CHLORIDE:  - mEq

POTASSIUM ACETATE:   55 mEq

POTASSIUM PHOSPHATE: - mmol

MAGNESIUM:           20 mEq

CALCIUM:             10 mEq

INSULIN:             30 units

MULTIPLE VITAMIN:    10 ml

TRACE ELEMENTS:      0.5 ml(s)



TPN PLAN:  

decrease kacetate to 55 meq/bag.





R: [g RX.ACTION] TPN []

Will monitor electrolytes, glucose, and tolerance to TPN.



 YENIFER STREETER RPH, 04/30/20 1106

## 2020-04-30 NOTE — PDOC
Objective:


Objective:


Nurse reports ongoing improvement.


Vital Signs:





                                   Vital Signs








  Date Time  Temp Pulse Resp B/P (MAP) Pulse Ox O2 Delivery O2 Flow Rate FiO2


 


4/30/20 08:25     98 Ventilator  


 


4/30/20 07:00  78 19 87/49 (62)    


 


4/30/20 04:00 97.6       





 97.6       








Labs:





Laboratory Tests








Test


 4/29/20


12:25 4/29/20


17:07 4/30/20


00:34 4/30/20


08:20


 


Glucose (Fingerstick) 125 mg/dL  133 mg/dL  112 mg/dL  


 


Sodium Level    152 mmol/L 


 


Potassium Level    3.6 mmol/L 


 


Chloride Level    113 mmol/L 


 


Carbon Dioxide Level    33 mmol/L 


 


Anion Gap    6 


 


Blood Urea Nitrogen    51 mg/dL 


 


Creatinine    1.1 mg/dL 


 


Estimated GFR


(Cockcroft-Gault) 


 


 


 52.8 





 


Glucose Level    119 mg/dL 


 


Calcium Level    8.5 mg/dL 








Imaging:


UE US 4/29


IMPRESSION:


1. No thrombus identified in deep venous system of bilateral upper extremity.





PE:





GEN: NAD - she waved


NEURO/PSYCH: A & O 3





A/P:


Necrotizing pancreatitis





--


Preparing for PICC placement when I saw, will follow-up later.





Hemodynamically unstable?:  No


Is patient in severe pain?:  No


Is NPO status required?:  Yes











RAGHAVENDRA MURCIA         Apr 30, 2020 10:24

## 2020-04-30 NOTE — NUR
Allergies and reactions  Codeine

 

INR 1.3  BUN 58   Cr 1.0   Platelets 446

 

Blood culture done Yes, most recent 4/14/2020

blood culture results  Negative

 

Order Verified Yes

 

Consent signed Yes

 

Previous PICC placement Yes

 

Past Medical/Surgical history and current diagnosis reviewed Yes

 

Patient Medical /Surgical History Related to PICC line placement 

Diabetes

History of acute/chronic renal failure

Infectious Disease consult

Past central line or venous access device placement

Renal consult



Special considerations for PICC line placement 

Anticoagulation therapy 

  

PICC placement indication   

Long term antibiotic usage, 

Multiple/ Frequent blood draws,  

Total Parenteral Nutrition (TPN)

 

JAKUB Reyes, PICC Nurse


-------------------------------------------------------------------------------

Addendum: 04/30/20 at 0940 by SHIRA NOLAN RN

-------------------------------------------------------------------------------

Amended: Links added.

## 2020-04-30 NOTE — NUR
CHIEF COMPLAINT: Behavior    HISTORY OF PRESENT ILLNESS:  Patient here with mother.  History per nurse's note, reviewed and agreed with.    Additional History:  Ricci is a 3 year old male who presents today with continued concerns about his behavior. Mom reports he is extremely hyperactive and moves nonstop. He is aggressive and defiant both at home and in . He is not willfully destructive. He does have times where he is affectionate and cuddly. He had been seen last year for these concerns. He had been referred to [facility] and behavior health, but family did not pursue these appointments. He is not having sleep issues.    REVIEW OF SYSTEMS:  Constitutional:  No fevers.  Normal intake of fluids and solids.  Skin:  No rash or lesion.  HEENT:  No eye drainage, ear pain, nasal congestion or sore throat.   Respiratory:  No cough, wheezing or breathing difficulty.   Gastrointestinal:  No vomiting or diarrhea.    Genitourinary:  No frequency, dysuria or hematuria.   Musculoskeletal:  No decrease in ROM (range of motion), pain with movement, joint swelling or extremity guarding.      EXAM:  Visit Vitals  Temp 97.9 °F (36.6 °C)   Ht 3' 3.37\" (1 m)   Wt 17.4 kg   BMI 17.35 kg/m²      GENERAL:  Well nourished, alert, healthy-appearing child. Very active and loud but cheerful and cooperative  HYDRATION:  Well hydrated, moist mucous membranes, normal skin turgor, eyes not sunken.  SKIN:  No significant lesions or rashes; normal texture.  EYES:  Clear sclerae, EOMI (extraocular movements are intact), PERRLA (pupils equal, round, and reactive to light and accommodation), no discharge.  EARS:  Normal ext (external) ears and canals.  TMs (tympanic membranes) pearly and mobile, non-inflamed.  NOSE:  Mucosa appears normal; nares patent.  THROAT:  No significant oral lesions; no significant pharyngeal or tonsillar pathology.  NECK:  No significant cervical or supraclavicular lymphadenopathy.  No masses.  HEART:   Procedure:  Following complete explanation of the PICC procedure including the indications, 
risks, and potential complications, informed consent was obtained.The possibility for 
infection was discussed along with signs, symptoms, and prevention. All the questions were 
answered.



Written and verbal patient education was provided. 



Hand hygiene performed. 



Standardized central line checklist was utilized. 



The patient was placed in the supine position, the arm was prepped with chlorhexidine and 
patient draped with maximum sterile barrier.  5 mL 1% lidocaine was infiltrated into the 
skin to provide local anesthesia.  A thorough assessment of Right upper extremity completed. 
 Using real-time ultrasound guidance and standardized micro puncture set, the Basilic vein 
was punctured and a peel away sheath was placed using the modified Seldinger technique. A 
tip location device was used to ensure adequate catheter placement.



The catheter was secured using a securement device and an antimicrobial patch was applied 
directly on the insertion site followed by a transparent dressing.  All ports withdraw blood 
and flush without resistance.



Patient tolerated the procedure without apparent complication(s). Triple Lumen Power PICC 
placement successful and uncomplicated.  Placement verified by EKG tip confirmation system.  
Tip located in the Cavoatrial Junciton.



Complications: None

Catheter timed to 47CM and inserted to 2CM


-------------------------------------------------------------------------------

Addendum: 04/30/20 at 0945 by SHIRA NOLAN RN

-------------------------------------------------------------------------------

Amended: Links added. Regular rate and rhythm; normal S1 & S2, no murmur; perfusion normal.  LUNGS:  Clear, good breath sounds; no rales, rhonchi, wheezes, or stridor.  No tachypnea or retracting; no respiratory distress.  ABDOMEN:  Soft, normal bowel sounds.  No rebound, guarding, or tenderness.  No masses.    IMPRESSION:  Aggressive, defiant, and hyperactive behavior    PLAN:  Try an incentive plan to modify his behavior.    Patient Instructions   Tantrums:  1. Ignore tantrums as much as possible, just walk away  2. Avoid battles by saying yes when you can, but stick to no once you have said so  3. Consider the temper tantrum clinic at [facility], [phone]  4. Schedule an appointment for a  Mental Health Assessment at [facility], [phone]  5. Consider the \"difficult child\", or the \"challenging child\" for behavioral suggestions    Behavioral Health Resources include:  · [facility]  [phone]  · [facility]  [phone]  · [facility]  [phone]  · [facility]  [phone]  · [facility]  [phone]  · [facility]  [phone]  · [facility]  [phone]   · [facility]  [phone] or [phone]  · [place]  [phone]  · [facility]  [phone]

## 2020-04-30 NOTE — NUR
SS following up with discharge planning. Pt's daughter, Beth, in room. SS met with pt and 
pt's daughter in room. Pt's daughter forwarded court documents to SS. Documents sent to [facility] 
and case . Copies placed in chart. Pt's daughter understanding that pt 
does not qualify for Medicaid and cannot get to rehab facility as self pay pt. Pt's daughter 
understanding she may need to take pt home when medically stable. SS communicated with [facility] 
and they are working to complete disability application. Dr. Yang completing physician 
statement. Pt on IV antibiotics and TPN. Pt has trach. Not medically stable for discharge to 
home per physician. SS will continue to follow for discharge planning.

## 2020-04-30 NOTE — PDOC
Infectious Disease Note


Subjective:


Subjective


Patient is doing much better


Sitting upright in chair


Edema is improving


Awaiting central line removal later today


On TPN





Vital Signs:


Vital Signs





Vital Signs








  Date Time  Temp Pulse Resp B/P (MAP) Pulse Ox O2 Delivery O2 Flow Rate FiO2


 


4/30/20 08:25     98 Ventilator  


 


4/30/20 07:00  78 19 87/49 (62)    


 


4/30/20 04:00 97.6       





 97.6       











Physical Exam:


PHYSICAL EXAM


GENERAL: Propped up in bed, sedated weak appearing 


HEENT: Pupils equal, + NGT, oral cavity dry 


NECK:  Trach/vent 


LUNGS: rhonchi 


HEART:  S1, S2, regular 


ABDOMEN: Distended, hypoactive BS, drain placement (4/27 )


: [place] (4/14)


EXTREMITIES: Generalized edema, no cyanosis, SCDs bilaterally 


DERMATOLOGIC:  Warm and dry.  No generalized rash.  


CENTRAL NERVOUS SYSTEM: Extremely weak, nods to few simple questions 


HDC has been removed


[facility] (4/14) clean





Medications:


Inpatient Meds:





Current Medications








 Medications


  (Trade)  Dose


 Ordered  Sig/Yee  Start Time


 Stop Time Status Last Admin


Dose Admin


 


 Acetaminophen


  (Tylenol Supp)  650 mg  PRN Q6HRS  PRN  3/24/20 10:30


    4/27/20 00:32


650 MG


 


 Acetaminophen


  (Tylenol)  650 mg  PRN Q6HRS  PRN  3/21/20 03:36


    4/16/20 19:56


650 MG


 


 Albumin Human  200 ml @ 


 200 mls/hr  1X PRN  PRN  4/21/20 08:00


 4/21/20 13:59 DC 4/21/20 08:40


200 MLS/HR


 


 Albuterol Sulfate


  (Ventolin Neb


 Soln)  2.5 mg  1X  ONCE  3/17/20 22:30


 3/17/20 22:31 DC 3/18/20 00:56


2.5 MG


 


 Alteplase,


 Recombinant


  (Cathflo For


 Central Catheter


 Clearance)  1 mg  1X  ONCE  4/24/20 10:45


 4/24/20 10:46 DC 4/24/20 11:44


1 MG


 


 Amino Acids/


 Glycerin/


 Electrolytes  1,000 ml @ 


 75 mls/hr  [id number]  4/20/20 21:15


   UNV  





 


 Artificial Tears


  (Artificial


 Tears)  1 drop  PRN Q15MIN  PRN  4/29/20 05:30


    4/30/20 08:16


1 DROP


 


 Atenolol


  (Tenormin)  100 mg  DAILY  3/17/20 09:00


 3/16/20 20:08 DC  





 


 Atropine Sulfate


  (ATROPINE 0.5mg


 SYRINGE)  0.5 mg  PRN Q5MIN  PRN  4/2/20 08:15


     





 


 Benzocaine


  (Hurricaine One)  1 spray  1X  ONCE  3/20/20 14:30


 3/20/20 14:31 DC 3/20/20 16:38


1 SPRAY


 


 Bisacodyl


  (Dulcolax Supp)  10 mg  STK-MED ONCE  4/27/20 10:59


 4/27/20 10:59 DC  





 


 Bumetanide


  (Bumex)  2 mg  [id number]  4/28/20 14:00


    4/30/20 08:17


2 MG


 


 Bupivacaine HCl/


 Epinephrine Bitart


  (Sensorcain-Epi


 0.5%-1:975032 Mpf)  30 ml  STK-MED ONCE  4/27/20 10:58


 4/27/20 10:58 DC 4/27/20 12:01


7 ML


 


 Calcium Carbonate/


 Glycine


  (Tums)  500 mg  PRN [id number]  PRN  3/18/20 17:45


     





 


 Calcium Chloride


 1000 mg/Sodium


 Chloride  110 ml @ 


 220 mls/hr  1X  ONCE  3/17/20 22:30


 3/17/20 22:59 DC 3/17/20 22:11


220 MLS/HR


 


 Calcium Chloride


 3000 mg/Sodium


 Chloride  1,030 ml @ 


 50 mls/hr  [id number]  3/19/20 08:00


 3/21/20 15:23 DC 3/21/20 02:17


50 MLS/HR


 


 Calcium Gluconate


  (Calcium


 Gluconate)  2,000 mg  1X  ONCE  3/19/20 02:15


 3/19/20 02:16 DC 3/19/20 02:19


2,000 MG


 


 Calcium Gluconate


 1000 mg/Sodium


 Chloride  110 ml @ 


 220 mls/hr  1X  ONCE  3/18/20 03:30


 3/18/20 03:59 DC 3/18/20 03:21


220 MLS/HR


 


 Calcium Gluconate


 2000 mg/Sodium


 Chloride  120 ml @ 


 220 mls/hr  1X  ONCE  3/18/20 07:30


 3/18/20 08:02 DC 3/18/20 09:05


220 MLS/HR


 


 Cefepime HCl


  (Maxipime)  2 gm  Q12HR  3/25/20 09:00


 4/8/20 09:58 DC 4/7/20 20:56


2 GM


 


 Cellulose


  (Surgicel


 Fibrillar 1x2)  1 each  STK-MED ONCE  4/6/20 11:00


 4/6/20 11:01 DC  





 


 Cellulose


  (Surgicel


 Hemostat 2x14)  1 each  STK-MED ONCE  4/27/20 10:58


 4/27/20 10:59 DC  





 


 Cellulose


  (Surgicel


 Hemostat 4x8)  1 each  STK-MED ONCE  4/27/20 10:58


 4/27/20 10:59 DC  





 


 Daptomycin 430 mg/


 Sodium Chloride  50 ml @ 


 100 mls/hr  Q24H  4/25/20 13:00


    4/29/20 12:26


100 MLS/HR


 


 Daptomycin 500 mg/


 Sodium Chloride  50 ml @ 


 100 mls/hr  Q48H  3/25/20 08:30


 4/10/20 10:07 DC 4/10/20 09:57


100 MLS/HR


 


 Dexamethasone


 Sodium Phosphate


  (Decadron)  4 mg  STK-MED ONCE  4/27/20 10:56


 4/27/20 10:57 DC  





 


 Dexmedetomidine


 HCl 400 mcg/


 Sodium Chloride  100 ml @ 0


 mls/hr  CONT  PRN  4/2/20 08:15


    4/30/20 08:19


30.5 MLS/HR


 


 Dextrose


  (Dextrose


 50%-Water Syringe)  12.5 gm  PRN Q15MIN  PRN  3/16/20 09:30


     





 


 Digoxin


  (Lanoxin)  125 mcg  1X  ONCE  3/19/20 18:00


 3/19/20 18:01 DC 3/19/20 17:10


125 MCG


 


 Diphenhydramine


 HCl


  (Benadryl)  25 mg  1X PRN  PRN  4/24/20 15:45


 4/25/20 15:44 DC  





 


 Enoxaparin Sodium


  (Lovenox 100mg


 Syringe)  100 mg  Q12HR  4/21/20 21:00


   UNV  





 


 Etomidate


  (Amidate)  8 mg  1X  ONCE  3/23/20 08:30


 3/23/20 08:31 DC 3/23/20 08:33


8 MG


 


 Fentanyl Citrate


  (Fentanyl 2ml


 Vial)  100 mcg  STK-MED ONCE  4/27/20 10:56


 4/27/20 10:57 DC  





 


 Furosemide


  (Lasix)  40 mg  1X  ONCE  4/13/20 14:30


 4/13/20 14:31 DC 4/13/20 14:39


40 MG


 


 Heparin Sodium


  (Porcine)


  (Hep Lock Adult)  500 unit  STK-MED ONCE  4/7/20 09:29


 4/7/20 09:30 DC  





 


 Heparin Sodium


  (Porcine)


  (Heparin Sodium)  5,000 unit  Q12HR  4/27/20 21:00


    4/30/20 08:20


5,000 UNIT


 


 Heparin Sodium


  (Porcine) 1000


 unit/Sodium


 Chloride  1,001 ml @ 


 1,001 mls/hr  1X  ONCE  4/27/20 12:00


 4/27/20 12:59 DC  





 


 Hydromorphone HCl


  (Dilaudid


 Standard PCA)  12 mg  STK-MED ONCE  4/28/20 12:05


 4/29/20 09:15 DC  





 


 Hydromorphone HCl


  (Dilaudid)  1 mg  PRN Q3HRS  PRN  3/17/20 12:00


 3/31/20 00:25 DC 3/23/20 05:13


1 MG


 


 Info


  (CONTRAST GIVEN


 -- Rx MONITORING)  1 each  PRN DAILY  PRN  3/30/20 11:45


 4/1/20 11:44 DC  





 


 Info


  (Icu Electrolyte


 Protocol)  1 ea  CONT PRN  PRN  3/29/20 13:15


     





 


 Info


  (PHARMACY


 MONITORING -- do


 not chart)  1 each  PRN DAILY  PRN  4/24/20 15:45


     





 


 Info


  (Tpn Per


 Pharmacy)  1 each  PRN DAILY  PRN  3/18/20 12:30


   UNV  





 


 Insulin Human


 Lispro


  (HumaLOG)  0-9 UNITS  Q6HRS  3/16/20 09:30


    4/28/20 08:42


4 UNITS


 


 Insulin Human


 Regular


  (HumuLIN R VIAL)  5 unit  1X  ONCE  3/17/20 22:30


 3/17/20 22:31 DC 3/17/20 22:14


5 UNIT


 


 Iohexol


  (Omnipaque 240


 Mg/ml)  30 ml  1X  ONCE  3/30/20 11:30


 3/30/20 11:33 DC 3/30/20 11:30


30 ML


 


 Iohexol


  (Omnipaque 300


 Mg/ml)  50 ml  STK-MED ONCE  4/27/20 10:58


 4/27/20 10:59 DC  





 


 Iohexol


  (Omnipaque 350


 Mg/ml)  90 ml  1X  ONCE  3/16/20 03:30


 3/16/20 03:31 DC 3/16/20 03:25


90 ML


 


 Ketorolac


 Tromethamine


  (Toradol 30mg


 Vial)  30 mg  1X  ONCE  3/16/20 03:00


 3/16/20 03:01 DC 3/16/20 02:54


30 MG


 


 Lidocaine HCl


  (Buffered


 Lidocaine 1%)  6 ml  1X  ONCE  4/15/20 13:30


 4/15/20 13:31 DC 4/15/20 13:45


9 ML


 


 Lidocaine HCl


  (Glydo


  (Lidocaine) Jelly)  1 thomas  1X  ONCE  3/20/20 14:30


 3/20/20 14:31 DC 3/20/20 16:38


1 THOMAS


 


 Lidocaine HCl


  (Xylocaine-Mpf


 1% 2ml Vial)  2 ml  PRN 1X  PRN  4/27/20 07:00


 4/28/20 06:59 DC  





 


 Linezolid/Dextrose  300 ml @ 


 300 mls/hr  Q12HR  4/10/20 11:00


 4/21/20 08:10 DC 4/20/20 20:40


300 MLS/HR


 


 Lorazepam


  (Ativan Inj)  1 mg  PRN Q4HRS  PRN  3/19/20 09:00


 4/17/20 09:19 DC 4/17/20 03:51


1 MG


 


 Magnesium Sulfate  50 ml @ 25


 mls/hr  PRN DAILY  PRN  4/5/20 09:15


    4/20/20 17:27


25 MLS/HR


 


 Meropenem 1 gm/


 Sodium Chloride  100 ml @ 


 200 mls/hr  Q8HRS  4/28/20 14:00


    4/30/20 06:23


200 MLS/HR


 


 Meropenem 500 mg/


 Sodium Chloride  50 ml @ 


 100 mls/hr  Q12H  4/8/20 10:00


 4/28/20 12:37 DC 4/28/20 10:45


100 MLS/HR


 


 Metoprolol


 Tartrate


  (Lopressor Vial)  5 mg  Q6HRS  3/17/20 10:15


 3/28/20 08:48 DC 3/26/20 00:12


5 MG


 


 Metronidazole  100 ml @ 


 100 mls/hr  Q8HRS  4/14/20 10:00


 4/21/20 08:10 DC 4/21/20 06:04


100 MLS/HR


 


 Micafungin Sodium


 100 mg/Dextrose  100 ml @ 


 100 mls/hr  Q24H  3/23/20 09:00


    4/30/20 08:18


100 MLS/HR


 


 Midazolam HCl


  (Versed)  5 mg  1X  ONCE  3/23/20 08:30


 3/23/20 08:31 DC  





 


 Midazolam HCl 100


 mg/Sodium Chloride  100 ml @ 7


 mls/hr  CONT  PRN  3/28/20 16:00


    4/8/20 15:35


7 MLS/HR


 


 Midazolam HCl 50


 mg/Sodium Chloride  50 ml @ 0


 mls/hr  CONT  PRN  3/23/20 08:15


 3/28/20 15:59 DC 3/26/20 22:39


7 MLS/HR


 


 Morphine Sulfate


  (Morphine


 Sulfate)  2 mg  PRN Q2HR  PRN  3/16/20 05:00


 3/17/20 14:15 DC 3/17/20 12:26


2 MG


 


 Multi-Ingred


 Cream/Lotion/Oil/


 Oint


  (Artificial


 Tears Eye


 Ointment)  1 thomas  PRN Q1HR  PRN  3/25/20 17:30


    4/13/20 08:19


1 THOMAS


 


 Naloxone HCl


  (Narcan)  0.4 mg  PRN Q2MIN  PRN  4/27/20 14:30


   UNV  





 


 Norepinephrine


 Bitartrate 8 mg/


 Dextrose  258 ml @ 


 17.299 mls/


 hr  CONT  PRN  3/17/20 15:30


 4/17/20 09:19 DC 4/14/20 12:48


20.9 MLS/HR


 


 Ondansetron HCl


  (Zofran)  4 mg  STK-MED ONCE  4/27/20 10:56


 4/27/20 10:57 DC  





 


 Pantoprazole


 Sodium


  (PROTONIX VIAL


 for IV PUSH)  40 mg  DAILYAC  3/16/20 11:30


    4/30/20 08:17


40 MG


 


 Phenylephrine HCl


  (PHENYLEPHRINE


 in 0.9% NACL PF)  1 mg  STK-MED ONCE  4/27/20 12:34


 4/27/20 12:34 DC  





 


 Piperacillin Sod/


 Tazobactam Sod


 4.5 gm/Sodium


 Chloride  100 ml @ 


 200 mls/hr  1X  ONCE  3/16/20 06:00


 3/16/20 06:29 DC 3/16/20 05:44


200 MLS/HR


 


 Potassium


 Chloride 15 meq/


 Bicarbonate


 Dialysis Soln w/


 out KCl  5,007.5 ml


  @ 1,000 mls/


 hr  Q5H1M  3/29/20 20:00


 4/2/20 13:08 DC 4/1/20 18:14


1,000 MLS/HR


 


 Potassium


 Chloride 20 meq/


 Bicarbonate


 Dialysis Soln w/


 out KCl  5,010 ml @ 


 1,000 mls/hr  Q5H1M  3/25/20 16:00


 3/29/20 19:59 DC 3/29/20 14:54


1,000 MLS/HR


 


 Potassium


 Chloride/Water  100 ml @ 


 100 mls/hr  1X  ONCE  4/18/20 14:45


 4/18/20 15:44 DC 4/18/20 17:28


100 MLS/HR


 


 Potassium


 Phosphate 20 mmol/


 Sodium Chloride  106.6667


 ml @ 


 51.667 m...  1X  ONCE  3/25/20 13:00


 3/25/20 15:03 DC 3/25/20 12:51


51.667 MLS/HR


 


 Potassium Acetate


 55 meq/Magnesium


 Sulfate 20 meq/


 Calcium Gluconate


 10 meq/


 Multivitamins 10


 ml/Chromium/


 Copper/Manganese/


 Seleni/Zn 0.5 ml/


 Insulin Human


 Regular 35 unit/


 Total Parenteral


 Nutrition/Amino


 Acids/Dextrose/


 Fat Emulsion


 Intravenous  1,920 ml @ 


 80 mls/hr  TPN  CONT  4/28/20 22:00


 4/29/20 21:59 DC 4/28/20 22:02


80 MLS/HR


 


 Potassium Acetate


 65 meq/Magnesium


 Sulfate 20 meq/


 Calcium Gluconate


 10 meq/


 Multivitamins 10


 ml/Chromium/


 Copper/Manganese/


 Seleni/Zn 0.5 ml/


 Insulin Human


 Regular 30 unit/


 Total Parenteral


 Nutrition/Amino


 Acids/Dextrose/


 Fat Emulsion


 Intravenous  1,920 ml @ 


 80 mls/hr  TPN  CONT  4/29/20 22:00


 4/30/20 21:59  4/29/20 22:22


80 MLS/HR


 


 Prochlorperazine


 Edisylate


  (Compazine)  5 mg  PACU PRN  PRN  4/27/20 07:00


 4/28/20 06:59 DC  





 


 Propofol  20 ml @ As


 Directed  STK-MED ONCE  4/27/20 12:26


 4/27/20 12:27 DC  





 


 Ringer's Solution  1,000 ml @ 


 30 mls/hr  Q24H  4/27/20 07:00


 4/27/20 18:59 DC  





 


 Rocuronium Bromide


  (Zemuron)  50 mg  STK-MED ONCE  4/27/20 10:56


 4/27/20 10:57 DC  





 


 Sevoflurane


  (Ultane)  60 ml  STK-MED ONCE  4/27/20 12:26


 4/27/20 12:27 DC  





 


 Sodium


 Bicarbonate 50


 meq/Sodium


 Chloride  1,050 ml @ 


 75 mls/hr  Q14H  3/18/20 07:30


 3/23/20 10:28 DC 3/22/20 21:10


75 MLS/HR


 


 Sodium Acetate 50


 meq/Potassium


 Acetate 55 meq/


 Magnesium Sulfate


 20 meq/Calcium


 Gluconate 10 meq/


 Multivitamins 10


 ml/Chromium/


 Copper/Manganese/


 Seleni/Zn 0.5 ml/


 Insulin Human


 Regular 35 unit/


 Total Parenteral


 Nutrition/Amino


 Acids/Dextrose/


 Fat Emulsion


 Intravenous  1,800 ml @ 


 75 mls/hr  TPN  CONT  4/25/20 22:00


 4/26/20 21:59 DC 4/25/20 22:03


75 MLS/HR


 


 Sodium Chloride  1,000 ml @ 


 25 mls/hr  Q24H  4/27/20 14:30


   UNV  





 


 Sodium Chloride


  (Normal Saline


 Flush)  3 ml  QSHIFT  PRN  4/27/20 13:45


     





 


 Sodium Chloride


 90 meq/Calcium


 Gluconate 10 meq/


 Multivitamins 10


 ml/Chromium/


 Copper/Manganese/


 Seleni/Zn 0.5 ml/


 Total Parenteral


 Nutrition/Amino


 Acids/Dextrose/


 Fat Emulsion


 Intravenous  1,512 ml @ 


 63 mls/hr  TPN  CONT  3/18/20 22:00


 3/19/20 21:59 DC 3/18/20 22:06


63 MLS/HR


 


 Sodium Chloride


 90 meq/Calcium


 Gluconate 10 meq/


 Multivitamins 10


 ml/Chromium/


 Copper/Manganese/


 Seleni/Zn 1 ml/


 Total Parenteral


 Nutrition/Amino


 Acids/Dextrose/


 Fat Emulsion


 Intravenous  55.005 ml


  @ 2.292


 mls/hr  TPN  CONT  3/18/20 22:00


 3/18/20 12:33 DC  





 


 Sodium Chloride


 90 meq/Magnesium


 Sulfate 10 meq/


 Calcium Gluconate


 20 meq/


 Multivitamins 10


 ml/Chromium/


 Copper/Manganese/


 Seleni/Zn 0.5 ml/


 Total Parenteral


 Nutrition/Amino


 Acids/Dextrose/


 Fat Emulsion


 Intravenous  1,512 ml @ 


 63 mls/hr  TPN  CONT  3/19/20 22:00


 3/20/20 21:59 DC 3/19/20 22:25


63 MLS/HR


 


 Sodium Chloride


 90 meq/Magnesium


 Sulfate 12 meq/


 Calcium Gluconate


 15 meq/


 Multivitamins 10


 ml/Chromium/


 Copper/Manganese/


 Seleni/Zn 0.5 ml/


 Insulin Human


 Regular 25 unit/


 Total Parenteral


 Nutrition/Amino


 Acids/Dextrose/


 Fat Emulsion


 Intravenous  1,400 ml @ 


 58.333 mls/


 hr  TPN  CONT  4/8/20 22:00


 4/9/20 21:59 DC 4/8/20 21:41


58.333 MLS/HR


 


 Sodium Chloride


 90 meq/Potassium


 Chloride 15 meq/


 Magnesium Sulfate


 12 meq/Calcium


 Gluconate 15 meq/


 Multivitamins 10


 ml/Chromium/


 Copper/Manganese/


 Seleni/Zn 0.5 ml/


 Insulin Human


 Regular 25 unit/


 Total Parenteral


 Nutrition/Amino


 Acids/Dextrose/


 Fat Emulsion


 Intravenous  1,400 ml @ 


 58.333 mls/


 hr  TPN  CONT  4/7/20 22:00


 4/8/20 21:59 DC 4/7/20 22:13


58.333 MLS/HR


 


 Sodium Chloride


 90 meq/Potassium


 Chloride 15 meq/


 Potassium


 Phosphate 10 mmol/


 Magnesium Sulfate


 8 meq/Calcium


 Gluconate 15 meq/


 Multivitamins 10


 ml/Chromium/


 Copper/Manganese/


 Seleni/Zn 0.5 ml/


 Insulin Human


 Regular 25 unit/


 Total Parenteral


 Nutrition/Amino


 Acids/Dextrose/


 Fat Emulsion


 Intravenous  1,400 ml @ 


 58.333 mls/


 hr  TPN  CONT  4/5/20 22:00


 4/6/20 21:59 DC 4/5/20 21:20


58.333 MLS/HR


 


 Sodium Chloride


 90 meq/Potassium


 Chloride 15 meq/


 Potassium


 Phosphate 10 mmol/


 Magnesium Sulfate


 10 meq/Calcium


 Gluconate 20 meq/


 Multivitamins 10


 ml/Chromium/


 Copper/Manganese/


 Seleni/Zn 0.5 ml/


 Total Parenteral


 Nutrition/Amino


 Acids/Dextrose/


 Fat Emulsion


 Intravenous  1,400 ml @ 


 58.333 mls/


 hr  TPN  CONT  3/23/20 22:00


 3/24/20 21:59 DC 3/23/20 21:42


58.333 MLS/HR


 


 Sodium Chloride


 90 meq/Potassium


 Chloride 15 meq/


 Potassium


 Phosphate 10 mmol/


 Magnesium Sulfate


 12 meq/Calcium


 Gluconate 15 meq/


 Multivitamins 10


 ml/Chromium/


 Copper/Manganese/


 Seleni/Zn 0.5 ml/


 Insulin Human


 Regular 25 unit/


 Total Parenteral


 Nutrition/Amino


 Acids/Dextrose/


 Fat Emulsion


 Intravenous  1,400 ml @ 


 58.333 mls/


 hr  TPN  CONT  4/6/20 22:00


 4/7/20 21:59 DC 4/6/20 22:24


58.333 MLS/HR


 


 Sodium Chloride


 90 meq/Potassium


 Chloride 15 meq/


 Potassium


 Phosphate 15 mmol/


 Magnesium Sulfate


 10 meq/Calcium


 Gluconate 15 meq/


 Multivitamins 10


 ml/Chromium/


 Copper/Manganese/


 Seleni/Zn 0.5 ml/


 Total Parenteral


 Nutrition/Amino


 Acids/Dextrose/


 Fat Emulsion


 Intravenous  1,400 ml @ 


 58.333 mls/


 hr  TPN  CONT  3/24/20 22:00


 3/25/20 21:59 DC 3/24/20 22:17


58.333 MLS/HR


 


 Sodium Chloride


 90 meq/Potassium


 Chloride 15 meq/


 Potassium


 Phosphate 15 mmol/


 Magnesium Sulfate


 10 meq/Calcium


 Gluconate 20 meq/


 Multivitamins 10


 ml/Chromium/


 Copper/Manganese/


 Seleni/Zn 0.5 ml/


 Total Parenteral


 Nutrition/Amino


 Acids/Dextrose/


 Fat Emulsion


 Intravenous  1,200 ml @ 


 50 mls/hr  TPN  CONT  3/22/20 22:00


 3/22/20 14:17 DC  





 


 Sodium Chloride


 90 meq/Potassium


 Chloride 15 meq/


 Potassium


 Phosphate 18 mmol/


 Magnesium Sulfate


 8 meq/Calcium


 Gluconate 15 meq/


 Multivitamins 10


 ml/Chromium/


 Copper/Manganese/


 Seleni/Zn 0.5 ml/


 Insulin Human


 Regular 10 unit/


 Total Parenteral


 Nutrition/Amino


 Acids/Dextrose/


 Fat Emulsion


 Intravenous  1,400 ml @ 


 58.333 mls/


 hr  TPN  CONT  3/27/20 22:00


 3/28/20 21:59 DC 3/27/20 21:43


58.333 MLS/HR


 


 Sodium Chloride


 90 meq/Potassium


 Chloride 15 meq/


 Potassium


 Phosphate 18 mmol/


 Magnesium Sulfate


 8 meq/Calcium


 Gluconate 15 meq/


 Multivitamins 10


 ml/Chromium/


 Copper/Manganese/


 Seleni/Zn 0.5 ml/


 Insulin Human


 Regular 15 unit/


 Total Parenteral


 Nutrition/Amino


 Acids/Dextrose/


 Fat Emulsion


 Intravenous  1,400 ml @ 


 58.333 mls/


 hr  TPN  CONT  3/30/20 22:00


 3/31/20 21:59 DC 3/30/20 21:47


58.333 MLS/HR


 


 Sodium Chloride


 90 meq/Potassium


 Chloride 15 meq/


 Potassium


 Phosphate 18 mmol/


 Magnesium Sulfate


 8 meq/Calcium


 Gluconate 15 meq/


 Multivitamins 10


 ml/Chromium/


 Copper/Manganese/


 Seleni/Zn 0.5 ml/


 Insulin Human


 Regular 20 unit/


 Total Parenteral


 Nutrition/Amino


 Acids/Dextrose/


 Fat Emulsion


 Intravenous  1,400 ml @ 


 58.333 mls/


 hr  TPN  CONT  4/2/20 22:00


 4/3/20 21:59 DC 4/2/20 22:45


58.333 MLS/HR


 


 Sodium Chloride


 90 meq/Potassium


 Chloride 15 meq/


 Potassium


 Phosphate 18 mmol/


 Magnesium Sulfate


 8 meq/Calcium


 Gluconate 15 meq/


 Multivitamins 10


 ml/Chromium/


 Copper/Manganese/


 Seleni/Zn 0.5 ml/


 Total Parenteral


 Nutrition/Amino


 Acids/Dextrose/


 Fat Emulsion


 Intravenous  1,400 ml @ 


 58.333 mls/


 hr  TPN  CONT  3/26/20 22:00


 3/27/20 21:59 DC 3/26/20 22:00


58.333 MLS/HR


 


 Sodium Chloride


 90 meq/Potassium


 Phosphate 15 mmol/


 Magnesium Sulfate


 12 meq/Calcium


 Gluconate 15 meq/


 Multivitamins 10


 ml/Chromium/


 Copper/Manganese/


 Seleni/Zn 0.5 ml/


 Insulin Human


 Regular 30 unit/


 Total Parenteral


 Nutrition/Amino


 Acids/Dextrose/


 Fat Emulsion


 Intravenous  1,400 ml @ 


 58.333 mls/


 hr  TPN  CONT  4/10/20 22:00


 4/11/20 21:59 DC 4/10/20 21:49


58.333 MLS/HR


 


 Sodium Chloride


 90 meq/Potassium


 Phosphate 15 mmol/


 Magnesium Sulfate


 12 meq/Calcium


 Gluconate 15 meq/


 Multivitamins 10


 ml/Chromium/


 Copper/Manganese/


 Seleni/Zn 0.5 ml/


 Insulin Human


 Regular 40 unit/


 Total Parenteral


 Nutrition/Amino


 Acids/Dextrose/


 Fat Emulsion


 Intravenous  1,400 ml @ 


 58.333 mls/


 hr  TPN  CONT  4/11/20 22:00


 4/12/20 21:59 DC 4/11/20 21:21


58.333 MLS/HR


 


 Sodium Chloride


 90 meq/Potassium


 Phosphate 19 mmol/


 Magnesium Sulfate


 12 meq/Calcium


 Gluconate 15 meq/


 Multivitamins 10


 ml/Chromium/


 Copper/Manganese/


 Seleni/Zn 0.5 ml/


 Insulin Human


 Regular 40 unit/


 Total Parenteral


 Nutrition/Amino


 Acids/Dextrose/


 Fat Emulsion


 Intravenous  1,400 ml @ 


 58.333 mls/


 hr  TPN  CONT  4/12/20 22:00


 4/13/20 21:59 DC 4/12/20 21:54


58.333 MLS/HR


 


 Sodium Chloride


 90 meq/Potassium


 Phosphate 5 mmol/


 Magnesium Sulfate


 12 meq/Calcium


 Gluconate 15 meq/


 Multivitamins 10


 ml/Chromium/


 Copper/Manganese/


 Seleni/Zn 0.5 ml/


 Insulin Human


 Regular 30 unit/


 Total Parenteral


 Nutrition/Amino


 Acids/Dextrose/


 Fat Emulsion


 Intravenous  1,400 ml @ 


 58.333 mls/


 hr  TPN  CONT  4/9/20 22:00


 4/10/20 21:59 DC 4/9/20 22:08


58.333 MLS/HR


 


 Sodium Chloride


 100 meq/Potassium


 Chloride 40 meq/


 Magnesium Sulfate


 15 meq/Calcium


 Gluconate 15 meq/


 Multivitamins 10


 ml/Chromium/


 Copper/Manganese/


 Seleni/Zn 0.5 ml/


 Insulin Human


 Regular 35 unit/


 Total Parenteral


 Nutrition/Amino


 Acids/Dextrose/


 Fat Emulsion


 Intravenous  1,400 ml @ 


 58.333 mls/


 hr  TPN  CONT  4/19/20 22:00


 4/20/20 21:59 DC 4/19/20 22:46


58.333 MLS/HR


 


 Sodium Chloride


 100 meq/Potassium


 Chloride 40 meq/


 Magnesium Sulfate


 20 meq/Calcium


 Gluconate 10 meq/


 Multivitamins 10


 ml/Chromium/


 Copper/Manganese/


 Seleni/Zn 0.5 ml/


 Insulin Human


 Regular 35 unit/


 Total Parenteral


 Nutrition/Amino


 Acids/Dextrose/


 Fat Emulsion


 Intravenous  1,400 ml @ 


 58.333 mls/


 hr  TPN  CONT  4/23/20 22:00


 4/24/20 21:59 DC 4/24/20 00:06


58.333 MLS/HR


 


 Sodium Chloride


 100 meq/Potassium


 Chloride 40 meq/


 Magnesium Sulfate


 20 meq/Calcium


 Gluconate 15 meq/


 Multivitamins 10


 ml/Chromium/


 Copper/Manganese/


 Seleni/Zn 0.5 ml/


 Insulin Human


 Regular 35 unit/


 Total Parenteral


 Nutrition/Amino


 Acids/Dextrose/


 Fat Emulsion


 Intravenous  1,400 ml @ 


 58.333 mls/


 hr  TPN  CONT  4/22/20 22:00


 4/23/20 21:59 DC 4/22/20 22:27


58.333 MLS/HR


 


 Sodium Chloride


 100 meq/Potassium


 Phosphate 10 mmol/


 Magnesium Sulfate


 12 meq/Calcium


 Gluconate 15 meq/


 Multivitamins 10


 ml/Chromium/


 Copper/Manganese/


 Seleni/Zn 0.5 ml/


 Insulin Human


 Regular 35 unit/


 Potassium


 Chloride 20 meq/


 Total Parenteral


 Nutrition/Amino


 Acids/Dextrose/


 Fat Emulsion


 Intravenous  1,400 ml @ 


 58.333 mls/


 hr  TPN  CONT  4/16/20 22:00


 4/17/20 21:59 DC 4/16/20 22:10


58.333 MLS/HR


 


 Sodium Chloride


 100 meq/Potassium


 Phosphate 19 mmol/


 Magnesium Sulfate


 12 meq/Calcium


 Gluconate 15 meq/


 Multivitamins 10


 ml/Chromium/


 Copper/Manganese/


 Seleni/Zn 0.5 ml/


 Insulin Human


 Regular 40 unit/


 Potassium


 Chloride 20 meq/


 Total Parenteral


 Nutrition/Amino


 Acids/Dextrose/


 Fat Emulsion


 Intravenous  1,400 ml @ 


 58.333 mls/


 hr  TPN  CONT  4/15/20 22:00


 4/16/20 21:59 DC 4/15/20 21:20


58.333 MLS/HR


 


 Sodium Chloride


 100 meq/Potassium


 Phosphate 5 mmol/


 Magnesium Sulfate


 12 meq/Calcium


 Gluconate 15 meq/


 Multivitamins 10


 ml/Chromium/


 Copper/Manganese/


 Seleni/Zn 0.5 ml/


 Insulin Human


 Regular 35 unit/


 Potassium


 Chloride 20 meq/


 Total Parenteral


 Nutrition/Amino


 Acids/Dextrose/


 Fat Emulsion


 Intravenous  1,400 ml @ 


 58.333 mls/


 hr  TPN  CONT  4/17/20 22:00


 4/18/20 21:59 DC 4/17/20 22:59


58.333 MLS/HR


 


 Succinylcholine


 Chloride


  (Anectine)  120 mg  1X  ONCE  3/23/20 08:30


 3/23/20 08:31 DC 3/23/20 08:34


120 MG











Labs:


Lab





Laboratory Tests








Test


 4/29/20


12:25 4/29/20


17:07 4/30/20


00:34 4/30/20


08:20


 


Glucose (Fingerstick)


 125 mg/dL


(70-99) 133 mg/dL


(70-99) 112 mg/dL


(70-99) 





 


Sodium Level


 


 


 


 152 mmol/L


(136-145)


 


Potassium Level


 


 


 


 3.6 mmol/L


(3.5-5.1)


 


Chloride Level


 


 


 


 113 mmol/L


()


 


Carbon Dioxide Level


 


 


 


 33 mmol/L


(21-32)


 


Anion Gap    6 (6-14) 


 


Blood Urea Nitrogen


 


 


 


 51 mg/dL


(7-20)


 


Creatinine


 


 


 


 1.1 mg/dL


(0.6-1.0)


 


Estimated GFR


(Cockcroft-Gault) 


 


 


 52.8 





 


Glucose Level


 


 


 


 119 mg/dL


(70-99)


 


Calcium Level


 


 


 


 8.5 mg/dL


(8.5-10.1)











Objective:


Assessment:


Fever resolving


Acute pancreatitis with persistent  necrosis


CT a/p 4/9


    Increased ascites. Persistent evidence of necrotizing pancreatitis with 

fluid and phlegmon


   at the pancreas


   4/27 status post ROBERT drain placement; 


Cholelithiasis with thickening of the gallbladder wall.


Leucocytosis improving


JED,Hyperkalemia, Metabolic acidosis off dialysis


Acute hypoxic resp failure ,bilateral pleural effusion and atelectasis


hypocalcemia 


Prediabetes


HTN


s/p trach





Plan:


Plan of Care


cont merrem (4/8), 


DC micafungin and daptomycin


Central line to be removed today


Bilateral upper extremity Doppler ultrasound neg for  DVT


Follow-up cultures and lab


Monitor for abx toxicities. 


Maintain aspiration precautions 


PT and OT as tolerated





D/w nursing











IVAN FRANZ MD           Apr 30, 2020 10:10

## 2020-04-30 NOTE — PDOC
Renal-Progress Notes


Subjective Notes


Notes


SITTING UP





History of Present Illness


Hx of present illness


STABLE





Vitals


Vitals





Vital Signs








  Date Time  Temp Pulse Resp B/P (MAP) Pulse Ox O2 Delivery O2 Flow Rate FiO2


 


4/30/20 12:00 101.0 98 22 139/64 (89) 98 Tracheal Collar  





 101.0       


 


4/30/20 11:17       10.0 








Weight


Weight [ ]





I.O.


Intake and Output











Intake and Output 


 


 4/30/20





 07:00


 


Intake Total 2946.0 ml


 


Output Total 5610 ml


 


Balance -2664.0 ml


 


 


 


Intake Oral 0 ml


 


IV Total 2946.0 ml


 


Output Urine Total 5540 ml


 


Drainage Total 70 ml











Labs


Labs





Laboratory Tests








Test


 4/29/20


17:07 4/30/20


00:34 4/30/20


08:20 4/30/20


12:10


 


Glucose (Fingerstick)


 133 mg/dL


(70-99) 112 mg/dL


(70-99) 


 169 mg/dL


(70-99)


 


White Blood Count


 


 


 11.3 x10^3/uL


(4.0-11.0) 





 


Red Blood Count


 


 


 2.66 x10^6/uL


(3.50-5.40) 





 


Hemoglobin


 


 


 7.7 g/dL


(12.0-15.5) 





 


Hematocrit


 


 


 24.5 %


(36.0-47.0) 





 


Mean Corpuscular Volume   92 fL ()  


 


Mean Corpuscular Hemoglobin   29 pg (25-35)  


 


Mean Corpuscular Hemoglobin


Concent 


 


 32 g/dL


(31-37) 





 


Red Cell Distribution Width


 


 


 18.6 %


(11.5-14.5) 





 


Platelet Count


 


 


 411 x10^3/uL


(140-400) 





 


Sodium Level


 


 


 152 mmol/L


(136-145) 





 


Potassium Level


 


 


 3.6 mmol/L


(3.5-5.1) 





 


Chloride Level


 


 


 113 mmol/L


() 





 


Carbon Dioxide Level


 


 


 33 mmol/L


(21-32) 





 


Anion Gap   6 (6-14)  


 


Blood Urea Nitrogen


 


 


 51 mg/dL


(7-20) 





 


Creatinine


 


 


 1.1 mg/dL


(0.6-1.0) 





 


Estimated GFR


(Cockcroft-Gault) 


 


 52.8 


 





 


Glucose Level


 


 


 119 mg/dL


(70-99) 





 


Calcium Level


 


 


 8.5 mg/dL


(8.5-10.1) 





 


Magnesium Level


 


 


 1.7 mg/dL


(1.8-2.4) 














Micro


Micro





Microbiology


4/15/20 Aerobic and Anaerobic Culture - Final, Complete


          


4/15/20 Anaerobic Culture Result 1 ([facility]) - Final, Complete


          


4/15/20 Aerobic Culture - Final, Complete


          


4/15/20 Aerobic Culture Result 1 ([facility]) - Final, Complete


          


4/15/20 Gram Stain - Final, Complete


          


4/15/20 Gram Stain Result 1 ([facility]SON) - Final, Complete


          


4/15/20 Gram Stain Result 2 ([facility]) - Final, Complete


          


4/14/20 Blood Culture - Final, Complete


          NO GROWTH AFTER 5 DAYS


4/12/20 Urine Culture - Final, Complete


          


4/12/20 Urine Culture Result 1 (ANSON) - Final, Complete





Review of Systems


Constitutional:  yes: other (ON THE VENT)





Physical Exam


General Appearance:  other (ON THE VENT, TRACH)


Skin:  warm


Respiratory:  decreased breath sounds


Heart:  S1S2


Abdomen:  soft, bowel sounds present


Genitourinary:  bladder flat


Extremities:  pulses present, edema


Neurology:  alert, oriented, other





Assessment


Assessment


IMP





FEVER AND LEUCOCYTOSIS-?SEPSIS


HYPERNATREMIA


JED-ATN-MUCH IMPROVED AND OFF HD FOR NOW


ANEMIA


ANASARCA DUE TO 3RD SPACING


HYPERKALEMIA-RESOLVED


ACIDOSIS AND ACIDEMIA-RESOLVED


ACUTE REPS FAILURE-TRACH


ACUTE PANCREATITIS


MALNUTRITION





PLAN





CONT TO HOLD HD FOR NOW


TPN TO CONTINUE AS NEEDED


PRBC AS NEEDED


START YOLI IF NEEDED


VENT SUPPORT


CONT IV LASIX


ANTIBIOTICS-ID EVAL AND TX


WILL NEED LTAC


UPDATED FAMILY


WILL FOLLOW





HAS GOOD NEG FLUID BALANCE LAST COUPLE DAYS


WILL CONT WITH DIURESIS


REHAB SOON











BETH HEIN MD                 Apr 30, 2020 12:47

## 2020-04-30 NOTE — NUR
JAKUB Fernando and I discussed pulling the central line and inserting a PICC this morning. 
Patient mouthed several questions and then stated she would like the PICC put in. She is 
aware that some pain is involved but she said that the pain would only be temporary. Verbal 
consent given. Patient has been very appropriate in her questions and answers all shift.

## 2020-04-30 NOTE — PDOC
PULMONARY PROGRESS NOTES


Subjective


Patient intubated on 3/23 , s/p trach 4/6, on vent


Taken to the OR 4/27, NO SURGERY DONE / NOT A CANDIDATE


DID TS FOR 1-2 HRS YESTERDAY


Vitals





Vital Signs








  Date Time  Temp Pulse Resp B/P (MAP) Pulse Ox O2 Delivery O2 Flow Rate FiO2


 


4/30/20 08:25     98 Ventilator  


 


4/30/20 07:00  78 19 87/49 (62)    


 


4/30/20 04:00 97.6       





 97.6       








Comments


ros unable to obtain  on vent


General:  Alert


HEENT:  Other (nc at perrl   nose clear  nech  trach site ok  no lad   no 

thyromegaly)


Lungs:  Crackles


Cardiovascular:  S1, S2


Abdomen:  Soft, Non-tender, Other (distended)


Neuro Exam:  Alert


Extremities:  Other (+3 generalized edema )


Skin:  Warm, Dry


Labs





Laboratory Tests








Test


 4/28/20


12:08 4/28/20


18:44 4/28/20


23:40 4/29/20


06:10


 


Glucose (Fingerstick)


 148 mg/dL


(70-99) 116 mg/dL


(70-99) 94 mg/dL


(70-99) 





 


White Blood Count


 


 


 


 10.4 x10^3/uL


(4.0-11.0)


 


Red Blood Count


 


 


 


 2.51 x10^6/uL


(3.50-5.40)


 


Hemoglobin


 


 


 


 7.4 g/dL


(12.0-15.5)


 


Hematocrit


 


 


 


 22.9 %


(36.0-47.0)


 


Mean Corpuscular Volume    91 fL () 


 


Mean Corpuscular Hemoglobin    29 pg (25-35) 


 


Mean Corpuscular Hemoglobin


Concent 


 


 


 32 g/dL


(31-37)


 


Red Cell Distribution Width


 


 


 


 18.4 %


(11.5-14.5)


 


Platelet Count


 


 


 


 439 x10^3/uL


(140-400)


 


Sodium Level


 


 


 


 151 mmol/L


(136-145)


 


Potassium Level


 


 


 


 3.6 mmol/L


(3.5-5.1)


 


Chloride Level


 


 


 


 114 mmol/L


()


 


Carbon Dioxide Level


 


 


 


 29 mmol/L


(21-32)


 


Anion Gap    8 (6-14) 


 


Blood Urea Nitrogen


 


 


 


 58 mg/dL


(7-20)


 


Creatinine


 


 


 


 1.0 mg/dL


(0.6-1.0)


 


Estimated GFR


(Cockcroft-Gault) 


 


 


 58.9 





 


Glucose Level


 


 


 


 104 mg/dL


(70-99)


 


Calcium Level


 


 


 


 8.2 mg/dL


(8.5-10.1)


 


Test


 4/29/20


06:14 4/29/20


08:00 4/29/20


12:25 4/29/20


17:07


 


Glucose (Fingerstick)


 87 mg/dL


(70-99) 


 125 mg/dL


(70-99) 133 mg/dL


(70-99)


 


White Blood Count


 


 14.5 x10^3/uL


(4.0-11.0) 


 





 


Red Blood Count


 


 2.89 x10^6/uL


(3.50-5.40) 


 





 


Hemoglobin


 


 8.5 g/dL


(12.0-15.5) 


 





 


Hematocrit


 


 26.9 %


(36.0-47.0) 


 





 


Mean Corpuscular Volume  93 fL ()   


 


Mean Corpuscular Hemoglobin  29 pg (25-35)   


 


Mean Corpuscular Hemoglobin


Concent 


 32 g/dL


(31-37) 


 





 


Red Cell Distribution Width


 


 18.5 %


(11.5-14.5) 


 





 


Platelet Count


 


 446 x10^3/uL


(140-400) 


 





 


Test


 4/30/20


00:34 4/30/20


08:20 


 





 


Glucose (Fingerstick)


 112 mg/dL


(70-99) 


 


 





 


Sodium Level


 


 152 mmol/L


(136-145) 


 





 


Potassium Level


 


 3.6 mmol/L


(3.5-5.1) 


 





 


Chloride Level


 


 113 mmol/L


() 


 





 


Carbon Dioxide Level


 


 33 mmol/L


(21-32) 


 





 


Anion Gap  6 (6-14)   


 


Blood Urea Nitrogen


 


 51 mg/dL


(7-20) 


 





 


Creatinine


 


 1.1 mg/dL


(0.6-1.0) 


 





 


Estimated GFR


(Cockcroft-Gault) 


 52.8 


 


 





 


Glucose Level


 


 119 mg/dL


(70-99) 


 





 


Calcium Level


 


 8.5 mg/dL


(8.5-10.1) 


 











Laboratory Tests








Test


 4/29/20


12:25 4/29/20


17:07 4/30/20


00:34 4/30/20


08:20


 


Glucose (Fingerstick)


 125 mg/dL


(70-99) 133 mg/dL


(70-99) 112 mg/dL


(70-99) 





 


Sodium Level


 


 


 


 152 mmol/L


(136-145)


 


Potassium Level


 


 


 


 3.6 mmol/L


(3.5-5.1)


 


Chloride Level


 


 


 


 113 mmol/L


()


 


Carbon Dioxide Level


 


 


 


 33 mmol/L


(21-32)


 


Anion Gap    6 (6-14) 


 


Blood Urea Nitrogen


 


 


 


 51 mg/dL


(7-20)


 


Creatinine


 


 


 


 1.1 mg/dL


(0.6-1.0)


 


Estimated GFR


(Cockcroft-Gault) 


 


 


 52.8 





 


Glucose Level


 


 


 


 119 mg/dL


(70-99)


 


Calcium Level


 


 


 


 8.5 mg/dL


(8.5-10.1)








Medications





Active Scripts








 Medications  Dose


 Route/Sig


 Max Daily Dose Days Date Category


 


 Bisoprolol


 Fumarate 5 Mg


 Tablet  10 Mg


 PO DAILY


   3/16/20 Reported








Comments


cxr reviewed.





Impression


.


IMPRESSION:


1.  Acute hypoxemic respiratory failure secondary to ARDS status post trach,


2.  Gallstone pancreatitis


3.  Severe metabolic acidosis.stable


4.  Acute kidney injury-stable, ON HD-- continue to improve 


5.  Acute gallstone pancreatitis.


6.  Hypoalbuminemia.


7.  Moderate persistent effusions


8.  Fever-  Per ID, per surgery


9.  Chronic anemia


10. Covid 19 testing negative


11. Moderate to large ascites-S/P paracentisis


S/P paracentisis with 4 liters removed on 4/15/20














Surgery note


Operative Note:


After obtaining informed consent, patient was taken to OR, induced under GETA a

nd prepped in the usual fashion.  5 mm port placed umbilical and right mid 

abdomen, all under laparoscopic guidance.  Large amount of ascites encountered 

and aspirated off.  Fluid was clear with some whitish debris.  Viscera was 

completely locked in with obliteration of all planes, preventing any significant

exploration.  Copious irrigation.





Given patient's overall clinical improvement, favor against open procedure and 

attempt at cholecystectomy and/or necrosectomy, given high risk of 

complications.  Cholecystectomy will need to be performed, but favor waiting 3 

months.





19 ROBERT drain placed and secured with 3 0 nylon.  Skin repaired with 4 0 monocryl.

 Dressing placed.





Patient tolerated procedure well and sent to PACU in stable condition.  All 

counts correct.  Wound class is 4.





Plan


.


We will continue our efforts at weaning


Resume TS trials, extend duration


precedex for anxiety


Follow surgery input


hep sq and protonix for prophylaxis


Follow ID rec, abx per id


Follow nephrology recs 


Nutritional support per surgery


continue TPN for nutrition 


DVT/GI PPX 


d/w RN/RT











SHARYN SOLORZANO MD                 Apr 30, 2020 10:30

## 2020-04-30 NOTE — PDOC
SURGICAL PROGRESS NOTE


Subjective


Pt awake on vent, responsive


Vital Signs





Vital Signs








  Date Time  Temp Pulse Resp B/P (MAP) Pulse Ox O2 Delivery O2 Flow Rate FiO2


 


4/30/20 11:17     97 Tracheal Collar 10.0 


 


4/30/20 07:00  78 19 87/49 (62)    


 


4/30/20 04:00 97.6       





 97.6       








I&O











Intake and Output 


 


 4/30/20





 07:00


 


Intake Total 2946.0 ml


 


Output Total 5610 ml


 


Balance -2664.0 ml


 


 


 


Intake Oral 0 ml


 


IV Total 2946.0 ml


 


Output Urine Total 5540 ml


 


Drainage Total 70 ml








General:  Alert, Cooperative, No acute distress


Abdomen:  Soft, No tenderness


Labs





Laboratory Tests








Test


 4/28/20


12:08 4/28/20


18:44 4/28/20


23:40 4/29/20


06:10


 


Glucose (Fingerstick)


 148 mg/dL


(70-99) 116 mg/dL


(70-99) 94 mg/dL


(70-99) 





 


White Blood Count


 


 


 


 10.4 x10^3/uL


(4.0-11.0)


 


Red Blood Count


 


 


 


 2.51 x10^6/uL


(3.50-5.40)


 


Hemoglobin


 


 


 


 7.4 g/dL


(12.0-15.5)


 


Hematocrit


 


 


 


 22.9 %


(36.0-47.0)


 


Mean Corpuscular Volume    91 fL () 


 


Mean Corpuscular Hemoglobin    29 pg (25-35) 


 


Mean Corpuscular Hemoglobin


Concent 


 


 


 32 g/dL


(31-37)


 


Red Cell Distribution Width


 


 


 


 18.4 %


(11.5-14.5)


 


Platelet Count


 


 


 


 439 x10^3/uL


(140-400)


 


Sodium Level


 


 


 


 151 mmol/L


(136-145)


 


Potassium Level


 


 


 


 3.6 mmol/L


(3.5-5.1)


 


Chloride Level


 


 


 


 114 mmol/L


()


 


Carbon Dioxide Level


 


 


 


 29 mmol/L


(21-32)


 


Anion Gap    8 (6-14) 


 


Blood Urea Nitrogen


 


 


 


 58 mg/dL


(7-20)


 


Creatinine


 


 


 


 1.0 mg/dL


(0.6-1.0)


 


Estimated GFR


(Cockcroft-Gault) 


 


 


 58.9 





 


Glucose Level


 


 


 


 104 mg/dL


(70-99)


 


Calcium Level


 


 


 


 8.2 mg/dL


(8.5-10.1)


 


Test


 4/29/20


06:14 4/29/20


08:00 4/29/20


12:25 4/29/20


17:07


 


Glucose (Fingerstick)


 87 mg/dL


(70-99) 


 125 mg/dL


(70-99) 133 mg/dL


(70-99)


 


White Blood Count


 


 14.5 x10^3/uL


(4.0-11.0) 


 





 


Red Blood Count


 


 2.89 x10^6/uL


(3.50-5.40) 


 





 


Hemoglobin


 


 8.5 g/dL


(12.0-15.5) 


 





 


Hematocrit


 


 26.9 %


(36.0-47.0) 


 





 


Mean Corpuscular Volume  93 fL ()   


 


Mean Corpuscular Hemoglobin  29 pg (25-35)   


 


Mean Corpuscular Hemoglobin


Concent 


 32 g/dL


(31-37) 


 





 


Red Cell Distribution Width


 


 18.5 %


(11.5-14.5) 


 





 


Platelet Count


 


 446 x10^3/uL


(140-400) 


 





 


Test


 4/30/20


00:34 4/30/20


08:20 


 





 


Glucose (Fingerstick)


 112 mg/dL


(70-99) 


 


 





 


Sodium Level


 


 152 mmol/L


(136-145) 


 





 


Potassium Level


 


 3.6 mmol/L


(3.5-5.1) 


 





 


Chloride Level


 


 113 mmol/L


() 


 





 


Carbon Dioxide Level


 


 33 mmol/L


(21-32) 


 





 


Anion Gap  6 (6-14)   


 


Blood Urea Nitrogen


 


 51 mg/dL


(7-20) 


 





 


Creatinine


 


 1.1 mg/dL


(0.6-1.0) 


 





 


Estimated GFR


(Cockcroft-Gault) 


 52.8 


 


 





 


Glucose Level


 


 119 mg/dL


(70-99) 


 





 


Calcium Level


 


 8.5 mg/dL


(8.5-10.1) 


 











Laboratory Tests








Test


 4/29/20


12:25 4/29/20


17:07 4/30/20


00:34 4/30/20


08:20


 


Glucose (Fingerstick)


 125 mg/dL


(70-99) 133 mg/dL


(70-99) 112 mg/dL


(70-99) 





 


Sodium Level


 


 


 


 152 mmol/L


(136-145)


 


Potassium Level


 


 


 


 3.6 mmol/L


(3.5-5.1)


 


Chloride Level


 


 


 


 113 mmol/L


()


 


Carbon Dioxide Level


 


 


 


 33 mmol/L


(21-32)


 


Anion Gap    6 (6-14) 


 


Blood Urea Nitrogen


 


 


 


 51 mg/dL


(7-20)


 


Creatinine


 


 


 


 1.1 mg/dL


(0.6-1.0)


 


Estimated GFR


(Cockcroft-Gault) 


 


 


 52.8 





 


Glucose Level


 


 


 


 119 mg/dL


(70-99)


 


Calcium Level


 


 


 


 8.5 mg/dL


(8.5-10.1)








Problem List


Problems


Medical Problems:


(1) Acute pancreatitis


Status: Acute  





(2) Cholelithiasis


Status: Acute  








Assessment/Plan


s/p lap exp


continues to improve


will clamp NGT and work towards removal


will ask speech to eval and consider work towards PO intake.











GAMAL ZHOU MD             Apr 30, 2020 11:37

## 2020-04-30 NOTE — NUR
Patient on trach shield since 1100. Tolerating well. Verified with Dr. Castano that patient is 
fine to remain on trach shield through night if patient continues to tolerate well. 

NG has been clamped since 1130. Order received from Dr. Stephens to give 2gm Mg IV. 



Patient transferred back to bed. Tolerated well. Currently sleeping.



[facility] sent to lab for culture. CELO PICC working appropriately, placement verified by Yi CALL.

## 2020-05-01 VITALS — DIASTOLIC BLOOD PRESSURE: 82 MMHG | SYSTOLIC BLOOD PRESSURE: 156 MMHG

## 2020-05-01 VITALS — DIASTOLIC BLOOD PRESSURE: 79 MMHG | SYSTOLIC BLOOD PRESSURE: 176 MMHG

## 2020-05-01 VITALS — SYSTOLIC BLOOD PRESSURE: 140 MMHG | DIASTOLIC BLOOD PRESSURE: 92 MMHG

## 2020-05-01 VITALS — SYSTOLIC BLOOD PRESSURE: 138 MMHG | DIASTOLIC BLOOD PRESSURE: 63 MMHG

## 2020-05-01 VITALS — DIASTOLIC BLOOD PRESSURE: 66 MMHG | SYSTOLIC BLOOD PRESSURE: 130 MMHG

## 2020-05-01 VITALS — SYSTOLIC BLOOD PRESSURE: 152 MMHG | DIASTOLIC BLOOD PRESSURE: 76 MMHG

## 2020-05-01 VITALS — DIASTOLIC BLOOD PRESSURE: 49 MMHG | SYSTOLIC BLOOD PRESSURE: 90 MMHG

## 2020-05-01 VITALS — DIASTOLIC BLOOD PRESSURE: 71 MMHG | SYSTOLIC BLOOD PRESSURE: 142 MMHG

## 2020-05-01 VITALS — SYSTOLIC BLOOD PRESSURE: 91 MMHG | DIASTOLIC BLOOD PRESSURE: 48 MMHG

## 2020-05-01 VITALS — DIASTOLIC BLOOD PRESSURE: 69 MMHG | SYSTOLIC BLOOD PRESSURE: 146 MMHG

## 2020-05-01 VITALS — DIASTOLIC BLOOD PRESSURE: 83 MMHG | SYSTOLIC BLOOD PRESSURE: 181 MMHG

## 2020-05-01 VITALS — SYSTOLIC BLOOD PRESSURE: 128 MMHG | DIASTOLIC BLOOD PRESSURE: 67 MMHG

## 2020-05-01 VITALS — DIASTOLIC BLOOD PRESSURE: 91 MMHG | SYSTOLIC BLOOD PRESSURE: 198 MMHG

## 2020-05-01 VITALS — SYSTOLIC BLOOD PRESSURE: 164 MMHG | DIASTOLIC BLOOD PRESSURE: 83 MMHG

## 2020-05-01 VITALS — DIASTOLIC BLOOD PRESSURE: 111 MMHG | SYSTOLIC BLOOD PRESSURE: 188 MMHG

## 2020-05-01 VITALS — SYSTOLIC BLOOD PRESSURE: 162 MMHG | DIASTOLIC BLOOD PRESSURE: 74 MMHG

## 2020-05-01 VITALS — SYSTOLIC BLOOD PRESSURE: 155 MMHG | DIASTOLIC BLOOD PRESSURE: 81 MMHG

## 2020-05-01 VITALS — DIASTOLIC BLOOD PRESSURE: 58 MMHG | SYSTOLIC BLOOD PRESSURE: 110 MMHG

## 2020-05-01 VITALS — SYSTOLIC BLOOD PRESSURE: 135 MMHG | DIASTOLIC BLOOD PRESSURE: 57 MMHG

## 2020-05-01 VITALS — DIASTOLIC BLOOD PRESSURE: 62 MMHG | SYSTOLIC BLOOD PRESSURE: 129 MMHG

## 2020-05-01 VITALS — DIASTOLIC BLOOD PRESSURE: 60 MMHG | SYSTOLIC BLOOD PRESSURE: 123 MMHG

## 2020-05-01 VITALS — DIASTOLIC BLOOD PRESSURE: 79 MMHG | SYSTOLIC BLOOD PRESSURE: 154 MMHG

## 2020-05-01 VITALS — SYSTOLIC BLOOD PRESSURE: 135 MMHG | DIASTOLIC BLOOD PRESSURE: 76 MMHG

## 2020-05-01 VITALS — DIASTOLIC BLOOD PRESSURE: 52 MMHG | SYSTOLIC BLOOD PRESSURE: 128 MMHG

## 2020-05-01 LAB
ANION GAP SERPL CALC-SCNC: 9 MMOL/L (ref 6–14)
BASOPHILS # BLD AUTO: 0 X10^3/UL (ref 0–0.2)
BASOPHILS NFR BLD: 0 % (ref 0–3)
BUN SERPL-MCNC: 47 MG/DL (ref 7–20)
CALCIUM SERPL-MCNC: 8.7 MG/DL (ref 8.5–10.1)
CHLORIDE SERPL-SCNC: 111 MMOL/L (ref 98–107)
CO2 SERPL-SCNC: 33 MMOL/L (ref 21–32)
CREAT SERPL-MCNC: 0.9 MG/DL (ref 0.6–1)
EOSINOPHIL NFR BLD: 0.1 X10^3/UL (ref 0–0.7)
EOSINOPHIL NFR BLD: 1 % (ref 0–3)
ERYTHROCYTE [DISTWIDTH] IN BLOOD BY AUTOMATED COUNT: 18.7 % (ref 11.5–14.5)
GFR SERPLBLD BASED ON 1.73 SQ M-ARVRAT: 66.5 ML/MIN
GLUCOSE SERPL-MCNC: 150 MG/DL (ref 70–99)
HCT VFR BLD CALC: 25.3 % (ref 36–47)
HGB BLD-MCNC: 8.1 G/DL (ref 12–15.5)
LYMPHOCYTES # BLD: 2.2 X10^3/UL (ref 1–4.8)
LYMPHOCYTES NFR BLD AUTO: 15 % (ref 24–48)
MAGNESIUM SERPL-MCNC: 1.8 MG/DL (ref 1.8–2.4)
MCH RBC QN AUTO: 29 PG (ref 25–35)
MCHC RBC AUTO-ENTMCNC: 32 G/DL (ref 31–37)
MCV RBC AUTO: 91 FL (ref 79–100)
MONO #: 0.5 X10^3/UL (ref 0–1.1)
MONOCYTES NFR BLD: 4 % (ref 0–9)
NEUT #: 11.7 X10^3/UL (ref 1.8–7.7)
NEUTROPHILS NFR BLD AUTO: 80 % (ref 31–73)
PHOSPHATE SERPL-MCNC: 4.6 MG/DL (ref 2.6–4.7)
PLATELET # BLD AUTO: 442 X10^3/UL (ref 140–400)
POTASSIUM SERPL-SCNC: 3.3 MMOL/L (ref 3.5–5.1)
RBC # BLD AUTO: 2.78 X10^6/UL (ref 3.5–5.4)
SODIUM SERPL-SCNC: 153 MMOL/L (ref 136–145)
WBC # BLD AUTO: 14.7 X10^3/UL (ref 4–11)

## 2020-05-01 RX ADMIN — HEPARIN SODIUM SCH UNIT: 5000 INJECTION, SOLUTION INTRAVENOUS; SUBCUTANEOUS at 21:41

## 2020-05-01 RX ADMIN — PANTOPRAZOLE SODIUM SCH MG: 40 INJECTION, POWDER, FOR SOLUTION INTRAVENOUS at 11:22

## 2020-05-01 RX ADMIN — MEROPENEM SCH MLS/HR: 1 INJECTION, POWDER, FOR SOLUTION INTRAVENOUS at 13:50

## 2020-05-01 RX ADMIN — BACITRACIN SCH MLS/HR: 5000 INJECTION, POWDER, FOR SOLUTION INTRAMUSCULAR at 12:35

## 2020-05-01 RX ADMIN — DEXMEDETOMIDINE HYDROCHLORIDE PRN MLS/HR: 100 INJECTION, SOLUTION, CONCENTRATE INTRAVENOUS at 12:34

## 2020-05-01 RX ADMIN — HEPARIN SODIUM SCH UNIT: 5000 INJECTION, SOLUTION INTRAVENOUS; SUBCUTANEOUS at 11:24

## 2020-05-01 RX ADMIN — Medication PRN MLS/HR: at 16:15

## 2020-05-01 RX ADMIN — DEXTRAN 70, GLYCERIN, HYPROMELLOSE PRN DROP: 1; 2; 3 SOLUTION/ DROPS OPHTHALMIC at 12:34

## 2020-05-01 RX ADMIN — MEROPENEM SCH MLS/HR: 1 INJECTION, POWDER, FOR SOLUTION INTRAVENOUS at 05:11

## 2020-05-01 RX ADMIN — DEXMEDETOMIDINE HYDROCHLORIDE PRN MLS/HR: 100 INJECTION, SOLUTION, CONCENTRATE INTRAVENOUS at 16:09

## 2020-05-01 RX ADMIN — BUMETANIDE SCH MG: 0.25 INJECTION INTRAMUSCULAR; INTRAVENOUS at 13:50

## 2020-05-01 RX ADMIN — DEXMEDETOMIDINE HYDROCHLORIDE PRN MLS/HR: 100 INJECTION, SOLUTION, CONCENTRATE INTRAVENOUS at 11:38

## 2020-05-01 RX ADMIN — INSULIN LISPRO SCH UNITS: 100 INJECTION, SOLUTION INTRAVENOUS; SUBCUTANEOUS at 17:32

## 2020-05-01 RX ADMIN — DEXMEDETOMIDINE HYDROCHLORIDE PRN MLS/HR: 100 INJECTION, SOLUTION, CONCENTRATE INTRAVENOUS at 05:10

## 2020-05-01 RX ADMIN — Medication PRN EACH: at 13:25

## 2020-05-01 RX ADMIN — DEXMEDETOMIDINE HYDROCHLORIDE PRN MLS/HR: 100 INJECTION, SOLUTION, CONCENTRATE INTRAVENOUS at 20:37

## 2020-05-01 RX ADMIN — BUMETANIDE SCH MG: 0.25 INJECTION INTRAMUSCULAR; INTRAVENOUS at 11:21

## 2020-05-01 RX ADMIN — INSULIN LISPRO SCH UNITS: 100 INJECTION, SOLUTION INTRAVENOUS; SUBCUTANEOUS at 12:00

## 2020-05-01 RX ADMIN — DEXMEDETOMIDINE HYDROCHLORIDE PRN MLS/HR: 100 INJECTION, SOLUTION, CONCENTRATE INTRAVENOUS at 00:56

## 2020-05-01 RX ADMIN — MEROPENEM SCH MLS/HR: 1 INJECTION, POWDER, FOR SOLUTION INTRAVENOUS at 21:39

## 2020-05-01 RX ADMIN — INSULIN LISPRO SCH UNITS: 100 INJECTION, SOLUTION INTRAVENOUS; SUBCUTANEOUS at 00:00

## 2020-05-01 RX ADMIN — INSULIN LISPRO SCH UNITS: 100 INJECTION, SOLUTION INTRAVENOUS; SUBCUTANEOUS at 06:00

## 2020-05-01 NOTE — PDOC
PROGRESS NOTES


Chief Complaint


Chief Complaint


Acute hypoxic Respiratory failure requiring mechanical ventilation (on vent 

since 3/23)


Tracheostomy


bilateral pleural effusions/pulm edema 


Sepsis


Severe Acute gallstone pancreatitis (not a surgical candidate at this time) with

necrosis


Acute kidney failure now requiring dialysis


Salpingitis


Gallstones (Calculus of gallbladder with acute cholecystitis without 

obstruction)


HTN 


Leukocytosis 


Hypoxia


Uterine fibroid


Intractable pain


Intractable nausea


Covid 19 negative. 


Acute on chronic anemia 


EEG: No seizure activity


ESRD on HD


Hyperglycemia





History of Present Illness


History of Present Illness


Ms Tadeo is a 48yo F w/ PMHx HTN, prediabetes who presented to the emergency 

room with complaints of abdominal pain on 3/16/2020. Found with Lipase 55419, 

, , Bilirubin 1.4.


CT abdomen confirms pancreatic inflammation, peripancreatic fluid and 

inflammatory changes around the pancreas consistent with pancreatitis. 

Cholelithiasis and 1.4cm uterine fibroid as well as possible left salpingitis. 

Admitted for further care


GI, General surgery, ID, Pulm consulted.





3/17: PICC placed per IR. Renal US negative. Started on levophed. Repeat CT 

abdomen w/ necrosis; 3/18: Dialysis catheter per nephrology; 3/19: On BiPAP; 

3/20: BiPAP, dialysis; 3/21: Overnight Tmax 101.7 , still on BiPAP FiO2 40%, 

still on low dose Levophed gtt, TPN initiated. On dialysis


4/6: Tracheostomy; 4/12: S/p tracheostomy on vent spontaneous respirations with 

5 of pressure support 35% FiO2, rectal tube and a Ch[place], off pressors; 

4/14:Still on vent via trach. Removed PICC and CVC LIJ and replaced. CT 

chest/abd/pelvis with bilateral pleural effusion and ascites.


4/15: Renal function stable. Still on vent. More interactive today. Miming wish 

for food. Plan discussed for thoracentesis/paracentesis with daughters today. 

They were under impression patient was doing worse due to a miscommunication 

which has been clarified over the phone. 4.3L removed.


4/17: Febrile overnight 101.8F. More interactive, still on vent. Asking for ice 

by miming; 4/18: Afebrile overnight. TMax last 24 hours 100.6F. Hb 7.1. 

Interactive when awake. 4/22: Transfusion 1u PRBC (6U total since admit)


4/23-4/26: TPN and precedex, vent.


4/27: Tmax 101F overnight. Hb 8.2. HD cath out since 4/24. Alert. On vent SIMV 

35% FiO2. Surgery: ex-lap, no naif or pancreatic necrosectomy 2/2 profound 

inflammation.


4/28: Seen POD #1. Afebrile overnight. BUN 62. CBC WNL today.Remains on vent via

trach, TPN. Able to point today and indicate she wishes her daughters and Rolf 

to be involved in her care.


4/29: Hb 7.4, Na 151. Remains on vent via trach, TPN. off HD for now. Not 

tolerating trach shield well.


4/30: Negative US for UE DVT. More relaxed today. Has some increase in UOP. 

Tolerating vent well. She is requesting to eat and drink via writing. T max 100F

24 hours ago, but 101F at 1200. Right PICC placed. Speaking valve for half the 

day in [facility]





Na 153 today. Good UOP. Tmax 101F at 1200 on 4/30. She becomes a bit anxious and

did not sleep much last night, wishes to try to sleep this morning





Plan:


Cont vent weaning


Trach shield and speaking valve during day when awake. Would recommend ABG after

4 hours to r/o CO2 retention, however and still vent overnight


Monitor fever curve, no obvious source of infection, possibly some aspiration 

events, atelectasis





Vitals


Vitals





Vital Signs








  Date Time  Temp Pulse Resp B/P (MAP) Pulse Ox O2 Delivery O2 Flow Rate FiO2


 


5/1/20 07:05     98 Tracheal Collar 10.0 


 


5/1/20 06:00 98.3 130 40 154/79 (104)    





 98.3       











Physical Exam


Physical Exam


GENERAL: Propped up in bed, sedated weak appearing 


HEENT: Pupils equal, + NGT, oral cavity dry 


NECK:  Trach/vent 


LUNGS: rhonchi 


HEART:  S1, S2, regular 


ABDOMEN: Distended, hypoactive BS, drain placement (4/27 )


: Chino (4/14)


EXTREMITIES: Generalized edema, no cyanosis, SCDs bilaterally 


DERMATOLOGIC:  Warm and dry.  No generalized rash.  


CENTRAL NERVOUS SYSTEM: Extremely weak, nods to few simple questions 


PICC line in place April 30, 2020 clean


HDC has been removed


LIJ removed


General:  No acute distress


Heart:  Regular rate, Normal S1, Other (increased rate)


Lungs:  Crackles


Abdomen:  Soft, No tenderness


Extremities:  Other (ANASARCA)


Skin:  Other (mottling noted to extremities )





Labs


LABS





Laboratory Tests








Test


 4/30/20


12:10 4/30/20


17:26 5/1/20


00:59 5/1/20


06:27


 


Glucose (Fingerstick)


 169 mg/dL


(70-99) 152 mg/dL


(70-99) 152 mg/dL


(70-99) 139 mg/dL


(70-99)


 


Test


 5/1/20


06:30 


 


 





 


White Blood Count


 14.7 x10^3/uL


(4.0-11.0) 


 


 





 


Red Blood Count


 2.78 x10^6/uL


(3.50-5.40) 


 


 





 


Hemoglobin


 8.1 g/dL


(12.0-15.5) 


 


 





 


Hematocrit


 25.3 %


(36.0-47.0) 


 


 





 


Mean Corpuscular Volume 91 fL ()    


 


Mean Corpuscular Hemoglobin 29 pg (25-35)    


 


Mean Corpuscular Hemoglobin


Concent 32 g/dL


(31-37) 


 


 





 


Red Cell Distribution Width


 18.7 %


(11.5-14.5) 


 


 





 


Platelet Count


 442 x10^3/uL


(140-400) 


 


 





 


Neutrophils (%) (Auto) 80 % (31-73)    


 


Lymphocytes (%) (Auto) 15 % (24-48)    


 


Monocytes (%) (Auto) 4 % (0-9)    


 


Eosinophils (%) (Auto) 1 % (0-3)    


 


Basophils (%) (Auto) 0 % (0-3)    


 


Neutrophils # (Auto)


 11.7 x10^3/uL


(1.8-7.7) 


 


 





 


Lymphocytes # (Auto)


 2.2 x10^3/uL


(1.0-4.8) 


 


 





 


Monocytes # (Auto)


 0.5 x10^3/uL


(0.0-1.1) 


 


 





 


Eosinophils # (Auto)


 0.1 x10^3/uL


(0.0-0.7) 


 


 





 


Basophils # (Auto)


 0.0 x10^3/uL


(0.0-0.2) 


 


 





 


Sodium Level


 153 mmol/L


(136-145) 


 


 





 


Potassium Level


 3.3 mmol/L


(3.5-5.1) 


 


 





 


Chloride Level


 111 mmol/L


() 


 


 





 


Carbon Dioxide Level


 33 mmol/L


(21-32) 


 


 





 


Anion Gap 9 (6-14)    


 


Blood Urea Nitrogen


 47 mg/dL


(7-20) 


 


 





 


Creatinine


 0.9 mg/dL


(0.6-1.0) 


 


 





 


Estimated GFR


(Cockcroft-Gault) 66.5 


 


 


 





 


Glucose Level


 150 mg/dL


(70-99) 


 


 





 


Calcium Level


 8.7 mg/dL


(8.5-10.1) 


 


 





 


Phosphorus Level


 4.6 mg/dL


(2.6-4.7) 


 


 





 


Magnesium Level


 1.8 mg/dL


(1.8-2.4) 


 


 














Assessment and Plan


Assessmemt and Plan


Problems


Medical Problems:


(1) Acute pancreatitis


Status: Acute  





(2) Cholelithiasis


Status: Acute  











Comment


Review of Relevant


I have reviewed the following items josy (where applicable) has been applied.


Labs





Laboratory Tests








Test


 4/29/20


12:25 4/29/20


17:07 4/30/20


00:34 4/30/20


08:20


 


Glucose (Fingerstick)


 125 mg/dL


(70-99) 133 mg/dL


(70-99) 112 mg/dL


(70-99) 





 


White Blood Count


 


 


 


 11.3 x10^3/uL


(4.0-11.0)


 


Red Blood Count


 


 


 


 2.66 x10^6/uL


(3.50-5.40)


 


Hemoglobin


 


 


 


 7.7 g/dL


(12.0-15.5)


 


Hematocrit


 


 


 


 24.5 %


(36.0-47.0)


 


Mean Corpuscular Volume    92 fL () 


 


Mean Corpuscular Hemoglobin    29 pg (25-35) 


 


Mean Corpuscular Hemoglobin


Concent 


 


 


 32 g/dL


(31-37)


 


Red Cell Distribution Width


 


 


 


 18.6 %


(11.5-14.5)


 


Platelet Count


 


 


 


 411 x10^3/uL


(140-400)


 


Sodium Level


 


 


 


 152 mmol/L


(136-145)


 


Potassium Level


 


 


 


 3.6 mmol/L


(3.5-5.1)


 


Chloride Level


 


 


 


 113 mmol/L


()


 


Carbon Dioxide Level


 


 


 


 33 mmol/L


(21-32)


 


Anion Gap    6 (6-14) 


 


Blood Urea Nitrogen


 


 


 


 51 mg/dL


(7-20)


 


Creatinine


 


 


 


 1.1 mg/dL


(0.6-1.0)


 


Estimated GFR


(Cockcroft-Gault) 


 


 


 52.8 





 


Glucose Level


 


 


 


 119 mg/dL


(70-99)


 


Calcium Level


 


 


 


 8.5 mg/dL


(8.5-10.1)


 


Magnesium Level


 


 


 


 1.7 mg/dL


(1.8-2.4)


 


Test


 4/30/20


12:10 4/30/20


17:26 5/1/20


00:59 5/1/20


06:27


 


Glucose (Fingerstick)


 169 mg/dL


(70-99) 152 mg/dL


(70-99) 152 mg/dL


(70-99) 139 mg/dL


(70-99)


 


Test


 5/1/20


06:30 


 


 





 


White Blood Count


 14.7 x10^3/uL


(4.0-11.0) 


 


 





 


Red Blood Count


 2.78 x10^6/uL


(3.50-5.40) 


 


 





 


Hemoglobin


 8.1 g/dL


(12.0-15.5) 


 


 





 


Hematocrit


 25.3 %


(36.0-47.0) 


 


 





 


Mean Corpuscular Volume 91 fL ()    


 


Mean Corpuscular Hemoglobin 29 pg (25-35)    


 


Mean Corpuscular Hemoglobin


Concent 32 g/dL


(31-37) 


 


 





 


Red Cell Distribution Width


 18.7 %


(11.5-14.5) 


 


 





 


Platelet Count


 442 x10^3/uL


(140-400) 


 


 





 


Neutrophils (%) (Auto) 80 % (31-73)    


 


Lymphocytes (%) (Auto) 15 % (24-48)    


 


Monocytes (%) (Auto) 4 % (0-9)    


 


Eosinophils (%) (Auto) 1 % (0-3)    


 


Basophils (%) (Auto) 0 % (0-3)    


 


Neutrophils # (Auto)


 11.7 x10^3/uL


(1.8-7.7) 


 


 





 


Lymphocytes # (Auto)


 2.2 x10^3/uL


(1.0-4.8) 


 


 





 


Monocytes # (Auto)


 0.5 x10^3/uL


(0.0-1.1) 


 


 





 


Eosinophils # (Auto)


 0.1 x10^3/uL


(0.0-0.7) 


 


 





 


Basophils # (Auto)


 0.0 x10^3/uL


(0.0-0.2) 


 


 





 


Sodium Level


 153 mmol/L


(136-145) 


 


 





 


Potassium Level


 3.3 mmol/L


(3.5-5.1) 


 


 





 


Chloride Level


 111 mmol/L


() 


 


 





 


Carbon Dioxide Level


 33 mmol/L


(21-32) 


 


 





 


Anion Gap 9 (6-14)    


 


Blood Urea Nitrogen


 47 mg/dL


(7-20) 


 


 





 


Creatinine


 0.9 mg/dL


(0.6-1.0) 


 


 





 


Estimated GFR


(Cockcroft-Gault) 66.5 


 


 


 





 


Glucose Level


 150 mg/dL


(70-99) 


 


 





 


Calcium Level


 8.7 mg/dL


(8.5-10.1) 


 


 





 


Phosphorus Level


 4.6 mg/dL


(2.6-4.7) 


 


 





 


Magnesium Level


 1.8 mg/dL


(1.8-2.4) 


 


 











Laboratory Tests








Test


 4/30/20


12:10 4/30/20


17:26 5/1/20


00:59 5/1/20


06:27


 


Glucose (Fingerstick)


 169 mg/dL


(70-99) 152 mg/dL


(70-99) 152 mg/dL


(70-99) 139 mg/dL


(70-99)


 


Test


 5/1/20


06:30 


 


 





 


White Blood Count


 14.7 x10^3/uL


(4.0-11.0) 


 


 





 


Red Blood Count


 2.78 x10^6/uL


(3.50-5.40) 


 


 





 


Hemoglobin


 8.1 g/dL


(12.0-15.5) 


 


 





 


Hematocrit


 25.3 %


(36.0-47.0) 


 


 





 


Mean Corpuscular Volume 91 fL ()    


 


Mean Corpuscular Hemoglobin 29 pg (25-35)    


 


Mean Corpuscular Hemoglobin


Concent 32 g/dL


(31-37) 


 


 





 


Red Cell Distribution Width


 18.7 %


(11.5-14.5) 


 


 





 


Platelet Count


 442 x10^3/uL


(140-400) 


 


 





 


Neutrophils (%) (Auto) 80 % (31-73)    


 


Lymphocytes (%) (Auto) 15 % (24-48)    


 


Monocytes (%) (Auto) 4 % (0-9)    


 


Eosinophils (%) (Auto) 1 % (0-3)    


 


Basophils (%) (Auto) 0 % (0-3)    


 


Neutrophils # (Auto)


 11.7 x10^3/uL


(1.8-7.7) 


 


 





 


Lymphocytes # (Auto)


 2.2 x10^3/uL


(1.0-4.8) 


 


 





 


Monocytes # (Auto)


 0.5 x10^3/uL


(0.0-1.1) 


 


 





 


Eosinophils # (Auto)


 0.1 x10^3/uL


(0.0-0.7) 


 


 





 


Basophils # (Auto)


 0.0 x10^3/uL


(0.0-0.2) 


 


 





 


Sodium Level


 153 mmol/L


(136-145) 


 


 





 


Potassium Level


 3.3 mmol/L


(3.5-5.1) 


 


 





 


Chloride Level


 111 mmol/L


() 


 


 





 


Carbon Dioxide Level


 33 mmol/L


(21-32) 


 


 





 


Anion Gap 9 (6-14)    


 


Blood Urea Nitrogen


 47 mg/dL


(7-20) 


 


 





 


Creatinine


 0.9 mg/dL


(0.6-1.0) 


 


 





 


Estimated GFR


(Cockcroft-Gault) 66.5 


 


 


 





 


Glucose Level


 150 mg/dL


(70-99) 


 


 





 


Calcium Level


 8.7 mg/dL


(8.5-10.1) 


 


 





 


Phosphorus Level


 4.6 mg/dL


(2.6-4.7) 


 


 





 


Magnesium Level


 1.8 mg/dL


(1.8-2.4) 


 


 











Microbiology


4/29/20 Blood Culture - Preliminary, Resulted


          NO GROWTH AFTER 1 DAY


4/27/20 Aerobic and Anaerobic Culture, Resulted


          Pending


4/27/20 Anaerobic Culture Result 1 (ANSON), Resulted


          Pending


4/27/20 Aerobic Culture, Resulted


          Pending


4/27/20 Aerobic Culture Result 1 (ANSON), Resulted


          Pending


4/27/20 Gram Stain - Final, Resulted


          


4/27/20 Gram Stain Result 1 (ANSON) - Final, Resulted


          


4/27/20 Gram Stain Result 2 (ANSON) - Final, Resulted


          


4/12/20 Urine Culture - Final, Complete


          


4/12/20 Urine Culture Result 1 (ANSON) - Final, Complete


Medications





Current Medications


Sodium Chloride 1,000 ml @  1,000 mls/hr Q1H IV  Last administered on 3/16/20at 

03:00;  Start 3/16/20 at 03:00;  Stop 3/16/20 at 03:59;  Status DC


Ondansetron HCl (Zofran) 4 mg 1X  ONCE IVP  Last administered on 3/16/20at 

03:27;  Start 3/16/20 at 03:00;  Stop 3/16/20 at 03:01;  Status DC


Morphine Sulfate (Morphine Sulfate) 4 mg 1X  ONCE IV ;  Start 3/16/20 at 03:00; 

Stop 3/16/20 at 03:01;  Status Cancel


Ketorolac Tromethamine (Toradol 30mg Vial) 30 mg 1X  ONCE IV  Last administered 

on 3/16/20at 02:54;  Start 3/16/20 at 03:00;  Stop 3/16/20 at 03:01;  Status DC


Fentanyl Citrate (Fentanyl 2ml Vial) 25 mcg 1X  ONCE IVP  Last administered on 

3/16/20at 03:23;  Start 3/16/20 at 03:30;  Stop 3/16/20 at 03:31;  Status DC


Fentanyl Citrate (Fentanyl 2ml Vial) 100 mcg STK-MED ONCE .ROUTE ;  Start 

3/16/20 at 03:18;  Stop 3/16/20 at 03:18;  Status DC


Iohexol (Omnipaque 350 Mg/ml) 90 ml 1X  ONCE IV  Last administered on 3/16/20at 

03:25;  Start 3/16/20 at 03:30;  Stop 3/16/20 at 03:31;  Status DC


Info (CONTRAST GIVEN -- Rx MONITORING) 1 each PRN DAILY  PRN MC SEE COMMENTS;  

Start 3/16/20 at 03:30;  Stop 3/18/20 at 03:29;  Status DC


Hydromorphone HCl (Dilaudid) 0.5 mg 1X  ONCE IV  Last administered on 3/16/20at 

03:55;  Start 3/16/20 at 04:30;  Stop 3/16/20 at 04:32;  Status DC


Ondansetron HCl (Zofran) 4 mg PRN Q8HRS  PRN IV NAUSEA/VOMITING 1ST CHOICE;  

Start 3/16/20 at 05:00;  Stop 3/16/20 at 09:27;  Status DC


Morphine Sulfate (Morphine Sulfate) 2 mg PRN Q2HR  PRN IV SEVERE PAIN 7-10 Last 

administered on 3/17/20at 12:26;  Start 3/16/20 at 05:00;  Stop 3/17/20 at 

14:15;  Status DC


Sodium Chloride 1,000 ml @  125 mls/hr Q8H IV  Last administered on 3/16/20at 

20:56;  Start 3/16/20 at 05:00;  Stop 3/17/20 at 04:59;  Status DC


Hydromorphone HCl (Dilaudid) 0.5 mg PRN Q3HRS  PRN IV SEVERE PAIN 7-10 Last 

administered on 3/17/20at 10:06;  Start 3/16/20 at 05:00;  Stop 3/17/20 at 

12:01;  Status DC


Piperacillin Sod/ Tazobactam Sod 4.5 gm/Sodium Chloride 100 ml @  200 mls/hr 1X 

ONCE IV  Last administered on 3/16/20at 05:44;  Start 3/16/20 at 06:00;  Stop 

3/16/20 at 06:29;  Status DC


Ondansetron HCl (Zofran) 4 mg PRN Q4HRS  PRN IV NAUSEA/VOMITING 1ST CHOICE Last 

administered on 4/24/20at 11:01;  Start 3/16/20 at 09:30


Insulin Human Lispro (HumaLOG) 0-9 UNITS Q6HRS SQ  Last administered on 

4/30/20at 17:29;  Start 3/16/20 at 09:30


Dextrose (Dextrose 50%-Water Syringe) 12.5 gm PRN Q15MIN  PRN IV SEE COMMENTS;  

Start 3/16/20 at 09:30


Pantoprazole Sodium (PROTONIX VIAL for IV PUSH) 40 mg DAILYAC IVP  Last 

administered on 4/30/20at 08:17;  Start 3/16/20 at 11:30


Prochlorperazine Edisylate (Compazine) 10 mg PRN Q6HRS  PRN IV NAUSEA/VOMITING, 

2nd CHOICE Last administered on 4/29/20at 14:59;  Start 3/16/20 at 17:45


Atenolol (Tenormin) 100 mg DAILY PO ;  Start 3/17/20 at 09:00;  Stop 3/16/20 at 

20:08;  Status DC


Metoprolol Tartrate (Lopressor Vial) 2.5 mg Q6HRS IVP  Last administered on 

3/17/20at 05:51;  Start 3/16/20 at 20:15;  Stop 3/17/20 at 10:02;  Status DC


Metoprolol Tartrate (Lopressor Vial) 5 mg Q6HRS IVP  Last administered on 

3/26/20at 00:12;  Start 3/17/20 at 10:15;  Stop 3/28/20 at 08:48;  Status DC


Hydromorphone HCl (Dilaudid) 1 mg PRN Q3HRS  PRN IV SEVERE PAIN 7-10 Last 

administered on 3/23/20at 05:13;  Start 3/17/20 at 12:00;  Stop 3/31/20 at 

00:25;  Status DC


Lidocaine HCl (Buffered Lidocaine 1%) 3 ml STK-MED ONCE .ROUTE ;  Start 3/17/20 

at 12:55;  Stop 3/17/20 at 12:56;  Status DC


Albumin Human 500 ml @  125 mls/hr 1X  ONCE IV  Last administered on 3/17/20at 

14:33;  Start 3/17/20 at 14:30;  Stop 3/17/20 at 18:32;  Status DC


Norepinephrine Bitartrate 8 mg/ Dextrose 258 ml @  17.299 mls/ hr CONT  PRN IV 

PER PROTOCOL Last administered on 4/14/20at 12:48;  Start 3/17/20 at 15:30;  

Stop 4/17/20 at 09:19;  Status DC


Sodium Chloride 1,000 ml @  125 mls/hr Q8H IV  Last administered on 3/17/20at 

21:04;  Start 3/17/20 at 16:00;  Stop 3/18/20 at 02:42;  Status DC


Albumin Human 500 ml @  125 mls/hr PRN BID  PRN IV After every 2L NSS & BP < 

90mm Last administered on 4/1/20at 14:21;  Start 3/17/20 at 16:00


Iohexol (Omnipaque 300 Mg/ml) 60 ml 1X  ONCE IV  Last administered on 3/17/20at 

17:20;  Start 3/17/20 at 17:00;  Stop 3/17/20 at 17:01;  Status DC


Info (CONTRAST GIVEN -- Rx MONITORING) 1 each PRN DAILY  PRN MC SEE COMMENTS;  

Start 3/17/20 at 17:00;  Stop 3/19/20 at 16:59;  Status DC


Meropenem 1 gm/ Sodium Chloride 100 ml @  200 mls/hr Q8HRS IV  Last administered

on 3/18/20at 05:45;  Start 3/17/20 at 20:00;  Stop 3/18/20 at 08:48;  Status DC


Furosemide (Lasix) 40 mg 1X  ONCE IVP  Last administered on 3/17/20at 22:12;  

Start 3/17/20 at 22:30;  Stop 3/17/20 at 22:31;  Status DC


Calcium Chloride 1000 mg/Sodium Chloride 110 ml @  220 mls/hr 1X  ONCE IV  Last 

administered on 3/17/20at 22:11;  Start 3/17/20 at 22:30;  Stop 3/17/20 at 

22:59;  Status DC


Albuterol Sulfate (Ventolin Neb Soln) 2.5 mg 1X  ONCE NEB  Last administered on 

3/18/20at 00:56;  Start 3/17/20 at 22:30;  Stop 3/17/20 at 22:31;  Status DC


Insulin Human Regular (HumuLIN R VIAL) 5 unit 1X  ONCE IV  Last administered on 

3/17/20at 22:14;  Start 3/17/20 at 22:30;  Stop 3/17/20 at 22:31;  Status DC


Magnesium Sulfate 50 ml @ 25 mls/hr 1X  ONCE IV  Last administered on 3/18/20at 

02:57;  Start 3/18/20 at 03:00;  Stop 3/18/20 at 04:59;  Status DC


Calcium Gluconate 1000 mg/Sodium Chloride 110 ml @  220 mls/hr 1X  ONCE IV  Last

administered on 3/18/20at 02:46;  Start 3/18/20 at 03:00;  Stop 3/18/20 at 03:2

9;  Status DC


Sodium Chloride 1,000 ml @  200 mls/hr Q5H IV  Last administered on 3/18/20at 

02:46;  Start 3/18/20 at 03:00;  Stop 3/18/20 at 10:21;  Status DC


Calcium Gluconate 1000 mg/Sodium Chloride 110 ml @  220 mls/hr 1X  ONCE IV  Last

administered on 3/18/20at 03:21;  Start 3/18/20 at 03:30;  Stop 3/18/20 at 

03:59;  Status DC


Sodium Bicarbonate 50 meq/Sodium Chloride 1,050 ml @  75 mls/hr Q14H IV  Last 

administered on 3/22/20at 21:10;  Start 3/18/20 at 07:30;  Stop 3/23/20 at 

10:28;  Status DC


Calcium Gluconate 2000 mg/Sodium Chloride 120 ml @  220 mls/hr 1X  ONCE IV  Last

administered on 3/18/20at 09:05;  Start 3/18/20 at 07:30;  Stop 3/18/20 at 

08:02;  Status DC


Lidocaine HCl (Xylocaine-Mpf 1% 2ml Vial) 2 ml STK-MED ONCE .ROUTE ;  Start 

3/18/20 at 08:47;  Stop 3/18/20 at 08:47;  Status DC


Meropenem 500 mg/ Sodium Chloride 50 ml @  100 mls/hr Q12HR IV  Last 

administered on 3/23/20at 21:01;  Start 3/18/20 at 18:00;  Stop 3/24/20 at 

07:58;  Status DC


Lidocaine HCl (Buffered Lidocaine 1%) 3 ml STK-MED ONCE .ROUTE ;  Start 3/18/20 

at 09:46;  Stop 3/18/20 at 09:46;  Status DC


Lidocaine HCl (Buffered Lidocaine 1%) 6 ml 1X  ONCE INJ  Last administered on 

3/18/20at 10:26;  Start 3/18/20 at 10:15;  Stop 3/18/20 at 10:16;  Status DC


Info (Tpn Per Pharmacy) 1 each PRN DAILY  PRN MC SEE COMMENTS Last administered 

on 4/30/20at 10:51;  Start 3/18/20 at 12:00


Sodium Chloride 1,000 ml @  1,000 mls/hr Q1H PRN IV hypotension;  Start 3/18/20 

at 12:07;  Stop 3/18/20 at 18:06;  Status DC


Diphenhydramine HCl (Benadryl) 25 mg 1X PRN  PRN IV ITCHING;  Start 3/18/20 at 

12:15;  Stop 3/19/20 at 12:14;  Status DC


Diphenhydramine HCl (Benadryl) 25 mg 1X PRN  PRN IV ITCHING;  Start 3/18/20 at 

12:15;  Stop 3/19/20 at 12:14;  Status DC


Sodium Chloride 1,000 ml @  400 mls/hr Q2H30M PRN IV PATENCY;  Start 3/18/20 at 

12:07;  Stop 3/19/20 at 00:06;  Status DC


Info (PHARMACY MONITORING -- do not chart) 1 each PRN DAILY  PRN MC SEE 

COMMENTS;  Start 3/18/20 at 12:15;  Stop 3/20/20 at 08:13;  Status DC


Sodium Chloride 90 meq/Calcium Gluconate 10 meq/ Multivitamins 10 ml/Chromium/ 

Copper/Manganese/ Seleni/Zn 1 ml/ Total Parenteral Nutrition/Amino 

Acids/Dextrose/ Fat Emulsion Intravenous 55.005 ml  @ 2.292 mls/hr TPN  CONT IV 

;  Start 3/18/20 at 22:00;  Stop 3/18/20 at 12:33;  Status DC


Info (Tpn Per Pharmacy) 1 each PRN DAILY  PRN MC SEE COMMENTS;  Start 3/18/20 at

12:30;  Status UNV


Sodium Chloride 90 meq/Calcium Gluconate 10 meq/ Multivitamins 10 ml/Chromium/ 

Copper/Manganese/ Seleni/Zn 0.5 ml/ Total Parenteral Nutrition/Amino 

Acids/Dextrose/ Fat Emulsion Intravenous 1,512 ml @  63 mls/hr TPN  CONT IV  

Last administered on 3/18/20at 22:06;  Start 3/18/20 at 22:00;  Stop 3/19/20 at 

21:59;  Status DC


Calcium Carbonate/ Glycine (Tums) 500 mg PRN AFTMEALHC  PRN PO INDIGESTION;  

Start 3/18/20 at 17:45


Calcium Gluconate (Calcium Gluconate) 2,000 mg 1X  ONCE IVP  Last administered 

on 3/19/20at 02:19;  Start 3/19/20 at 02:15;  Stop 3/19/20 at 02:16;  Status DC


Calcium Chloride 3000 mg/Sodium Chloride 1,030 ml @  50 mls/hr [id number] IV  Last 

administered on 3/21/20at 02:17;  Start 3/19/20 at 08:00;  Stop 3/21/20 at 

15:23;  Status DC


Lorazepam (Ativan Inj) 1 mg PRN Q4HRS  PRN IVP ANXIETY / AGITATION, 2nd choic 

Last administered on 4/17/20at 03:51;  Start 3/19/20 at 09:00;  Stop 4/17/20 at 

09:19;  Status DC


Sodium Chloride 1,000 ml @  1,000 mls/hr Q1H PRN IV hypotension;  Start 3/19/20 

at 08:56;  Stop 3/19/20 at 14:55;  Status DC


Albumin Human 200 ml @  200 mls/hr 1X PRN  PRN IV Hypotension;  Start 3/19/20 at

09:00;  Stop 3/19/20 at 14:59;  Status DC


Diphenhydramine HCl (Benadryl) 25 mg 1X PRN  PRN IV ITCHING;  Start 3/19/20 at 

09:00;  Stop 3/20/20 at 08:59;  Status DC


Diphenhydramine HCl (Benadryl) 25 mg 1X PRN  PRN IV ITCHING;  Start 3/19/20 at 

09:00;  Stop 3/20/20 at 08:59;  Status DC


Sodium Chloride 1,000 ml @  400 mls/hr Q2H30M PRN IV PATENCY;  Start 3/19/20 at 

08:56;  Stop 3/19/20 at 20:55;  Status DC


Info (PHARMACY MONITORING -- do not chart) 1 each PRN DAILY  PRN MC SEE 

COMMENTS;  Start 3/19/20 at 09:00;  Status UNV


Info (PHARMACY MONITORING -- do not chart) 1 each PRN DAILY  PRN MC SEE 

COMMENTS;  Start 3/19/20 at 09:00;  Stop 3/20/20 at 08:13;  Status DC


Digoxin (Lanoxin) 500 mcg 1X  ONCE IV  Last administered on 3/19/20at 10:04;  

Start 3/19/20 at 10:00;  Stop 3/19/20 at 10:01;  Status DC


Digoxin (Lanoxin) 125 mcg 1X  ONCE IV  Last administered on 3/19/20at 17:10;  

Start 3/19/20 at 18:00;  Stop 3/19/20 at 18:01;  Status DC


Magnesium Sulfate 100 ml @  25 mls/hr 1X  ONCE IV  Last administered on 

3/19/20at 12:48;  Start 3/19/20 at 13:00;  Stop 3/19/20 at 16:59;  Status DC


Sodium Chloride 90 meq/Magnesium Sulfate 10 meq/ Calcium Gluconate 20 meq/ 

Multivitamins 10 ml/Chromium/ Copper/Manganese/ Seleni/Zn 0.5 ml/ Total 

Parenteral Nutrition/Amino Acids/Dextrose/ Fat Emulsion Intravenous 1,512 ml @  

63 mls/hr TPN  CONT IV  Last administered on 3/19/20at 22:25;  Start 3/19/20 at 

22:00;  Stop 3/20/20 at 21:59;  Status DC


Sodium Chloride 1,000 ml @  1,000 mls/hr Q1H PRN IV hypotension;  Start 3/20/20 

at 08:05;  Stop 3/20/20 at 14:04;  Status DC


Albumin Human 200 ml @  200 mls/hr 1X  ONCE IV  Last administered on 3/20/20at 

08:57;  Start 3/20/20 at 08:15;  Stop 3/20/20 at 09:14;  Status DC


Diphenhydramine HCl (Benadryl) 25 mg 1X PRN  PRN IV ITCHING;  Start 3/20/20 at 

08:15;  Stop 3/21/20 at 08:14;  Status DC


Diphenhydramine HCl (Benadryl) 25 mg 1X PRN  PRN IV ITCHING;  Start 3/20/20 at 

08:15;  Stop 3/21/20 at 08:14;  Status DC


Sodium Chloride 1,000 ml @  400 mls/hr Q2H30M PRN IV PATENCY;  Start 3/20/20 at 

08:05;  Stop 3/20/20 at 20:04;  Status DC


Info (PHARMACY MONITORING -- do not chart) 1 each PRN DAILY  PRN MC SEE 

COMMENTS;  Start 3/20/20 at 08:15;  Stop 3/24/20 at 07:57;  Status DC


Sodium Chloride 90 meq/Potassium Chloride 15 meq/ Potassium Phosphate 10 mmol/ 

Magnesium Sulfate 10 meq/Calcium Gluconate 20 meq/ Multivitamins 10 ml/Chromium/

Copper/Manganese/ Seleni/Zn 0.5 ml/ Total Parenteral Nutrition/Amino 

Acids/Dextrose/ Fat Emulsion Intravenous 1,512 ml @  63 mls/hr TPN  CONT IV  

Last administered on 3/20/20at 21:01;  Start 3/20/20 at 22:00;  Stop 3/21/20 at 

21:59;  Status DC


Potassium Chloride/Water 100 ml @  100 mls/hr 1X  ONCE IV  Last administered on 

3/20/20at 14:09;  Start 3/20/20 at 14:00;  Stop 3/20/20 at 14:59;  Status DC


Benzocaine (Hurricaine One) 1 spray 1X  ONCE MM  Last administered on 3/20/20at 

16:38;  Start 3/20/20 at 14:30;  Stop 3/20/20 at 14:31;  Status DC


Lidocaine HCl (Glydo (Lidocaine) Jelly) 1 thomas 1X  ONCE MM  Last administered on 

3/20/20at 16:38;  Start 3/20/20 at 14:30;  Stop 3/20/20 at 14:31;  Status DC


Linezolid/Dextrose 300 ml @  300 mls/hr Q12HR IV  Last administered on 3/26/20at

21:04;  Start 3/20/20 at 20:00;  Stop 3/27/20 at 07:50;  Status DC


Acetaminophen (Tylenol) 650 mg PRN Q6HRS  PRN PO MILD PAIN / TEMP;  Start 

3/21/20 at 03:30;  Stop 3/21/20 at 03:36;  Status DC


Acetaminophen (Tylenol) 650 mg PRN Q6HRS  PRN PEG MILD PAIN / TEMP Last 

administered on 4/16/20at 19:56;  Start 3/21/20 at 03:36


Sodium Chloride 1,000 ml @  1,000 mls/hr Q1H PRN IV hypotension;  Start 3/21/20 

at 07:50;  Stop 3/21/20 at 13:49;  Status DC


Albumin Human 200 ml @  200 mls/hr 1X PRN  PRN IV Hypotension;  Start 3/21/20 at

08:00;  Stop 3/21/20 at 13:59;  Status DC


Sodium Chloride (Normal Saline Flush) 10 ml 1X PRN  PRN IV AP catheter pack;  

Start 3/21/20 at 08:00;  Stop 3/22/20 at 07:59;  Status DC


Sodium Chloride (Normal Saline Flush) 10 ml 1X PRN  PRN IV  catheter pack;  

Start 3/21/20 at 08:00;  Stop 3/22/20 at 07:59;  Status DC


Sodium Chloride 1,000 ml @  400 mls/hr Q2H30M PRN IV PATENCY;  Start 3/21/20 at 

07:50;  Stop 3/21/20 at 19:49;  Status DC


Info (PHARMACY MONITORING -- do not chart) 1 each PRN DAILY  PRN MC SEE 

COMMENTS;  Start 3/21/20 at 08:00;  Status UNV


Info (PHARMACY MONITORING -- do not chart) 1 each PRN DAILY  PRN MC SEE 

COMMENTS;  Start 3/21/20 at 08:00;  Stop 3/23/20 at 08:25;  Status DC


Sodium Chloride 90 meq/Potassium Chloride 15 meq/ Potassium Phosphate 10 mmol/ 

Magnesium Sulfate 10 meq/Calcium Gluconate 20 meq/ Multivitamins 10 ml/Chromium/

Copper/Manganese/ Seleni/Zn 0.5 ml/ Total Parenteral Nutrition/Amino 

Acids/Dextrose/ Fat Emulsion Intravenous 1,512 ml @  63 mls/hr TPN  CONT IV  

Last administered on 3/21/20at 20:57;  Start 3/21/20 at 22:00;  Stop 3/22/20 at 

21:59;  Status DC


Sodium Chloride 90 meq/Potassium Chloride 15 meq/ Potassium Phosphate 15 mmol/ 

Magnesium Sulfate 10 meq/Calcium Gluconate 20 meq/ Multivitamins 10 ml/Chromium/

Copper/Manganese/ Seleni/Zn 0.5 ml/ Total Parenteral Nutrition/Amino 

Acids/Dextrose/ Fat Emulsion Intravenous 1,512 ml @  63 mls/hr TPN  CONT IV ;  

Start 3/22/20 at 22:00;  Stop 3/22/20 at 14:16;  Status DC


Sodium Chloride 90 meq/Potassium Chloride 15 meq/ Potassium Phosphate 15 mmol/ 

Magnesium Sulfate 10 meq/Calcium Gluconate 20 meq/ Multivitamins 10 ml/Chromium/

Copper/Manganese/ Seleni/Zn 0.5 ml/ Total Parenteral Nutrition/Amino 

Acids/Dextrose/ Fat Emulsion Intravenous 1,200 ml @  50 mls/hr TPN  CONT IV ;  

Start 3/22/20 at 22:00;  Stop 3/22/20 at 14:17;  Status DC


Sodium Chloride 90 meq/Potassium Chloride 15 meq/ Potassium Phosphate 10 mmol/ 

Magnesium Sulfate 10 meq/Calcium Gluconate 20 meq/ Multivitamins 10 ml/Chromium/

Copper/Manganese/ Seleni/Zn 0.5 ml/ Total Parenteral Nutrition/Amino 

Acids/Dextrose/ Fat Emulsion Intravenous 1,200 ml @  50 mls/hr TPN  CONT IV  

Last administered on 3/22/20at 23:29;  Start 3/22/20 at 22:00;  Stop 3/23/20 at 

21:59;  Status DC


Sodium Chloride 1,000 ml @  1,000 mls/hr Q1H PRN IV hypotension;  Start 3/23/20 

at 07:28;  Stop 3/23/20 at 13:27;  Status DC


Albumin Human 200 ml @  200 mls/hr 1X  ONCE IV  Last administered on 3/23/20at 

08:51;  Start 3/23/20 at 07:30;  Stop 3/23/20 at 08:29;  Status DC


Diphenhydramine HCl (Benadryl) 25 mg 1X PRN  PRN IV ITCHING;  Start 3/23/20 at 

07:30;  Stop 3/24/20 at 07:29;  Status DC


Diphenhydramine HCl (Benadryl) 25 mg 1X PRN  PRN IV ITCHING;  Start 3/23/20 at 

07:30;  Stop 3/24/20 at 07:29;  Status DC


Sodium Chloride 1,000 ml @  400 mls/hr Q2H30M PRN IV PATENCY;  Start 3/23/20 at 

07:28;  Stop 3/23/20 at 19:27;  Status DC


Info (PHARMACY MONITORING -- do not chart) 1 each PRN DAILY  PRN MC SEE 

COMMENTS;  Start 3/23/20 at 07:30;  Stop 4/3/20 at 13:01;  Status DC


Metronidazole 100 ml @  100 mls/hr Q6HRS IV  Last administered on 4/8/20at 

06:26;  Start 3/23/20 at 08:30;  Stop 4/8/20 at 09:58;  Status DC


Micafungin Sodium 100 mg/Dextrose 100 ml @  100 mls/hr Q24H IV  Last 

administered on 4/30/20at 08:18;  Start 3/23/20 at 09:00;  Stop 4/30/20 at 

20:58;  Status DC


Propofol 0 ml @ As Directed STK-MED ONCE IV ;  Start 3/23/20 at 07:53;  Stop 

3/23/20 at 07:53;  Status DC


Etomidate (Amidate) 20 mg STK-MED ONCE IV ;  Start 3/23/20 at 07:53;  Stop 

3/23/20 at 07:54;  Status DC


Midazolam HCl (Versed) 5 mg STK-MED ONCE .ROUTE ;  Start 3/23/20 at 07:57;  Stop

3/23/20 at 07:57;  Status DC


Fentanyl Citrate 30 ml @ 0 mls/hr CONT  PRN IV SEE PROTOCOL Last administered on

4/17/20at 06:12;  Start 3/23/20 at 08:15;  Stop 4/17/20 at 09:19;  Status DC


Artificial Tears (Artificial Tears) 1 drop PRN Q1HR  PRN OU DRY EYE, 1st choice;

 Start 3/23/20 at 08:15;  Stop 4/29/20 at 05:31;  Status DC


Midazolam HCl 50 mg/Sodium Chloride 50 ml @ 0 mls/hr CONT  PRN IV SEE PROTOCOL 

Last administered on 3/26/20at 22:39;  Start 3/23/20 at 08:15;  Stop 3/28/20 at 

15:59;  Status DC


Etomidate (Amidate) 8 mg 1X  ONCE IV  Last administered on 3/23/20at 08:33;  

Start 3/23/20 at 08:30;  Stop 3/23/20 at 08:31;  Status DC


Succinylcholine Chloride (Anectine) 120 mg 1X  ONCE IV  Last administered on 

3/23/20at 08:34;  Start 3/23/20 at 08:30;  Stop 3/23/20 at 08:31;  Status DC


Midazolam HCl (Versed) 5 mg 1X  ONCE IV ;  Start 3/23/20 at 08:30;  Stop 3/23/20

at 08:31;  Status DC


Potassium Chloride 15 meq/ Bicarbonate Dialysis Soln w/ out KCl 5,007.5 ml  @ 

1,000 mls/ hr Q5H1M IV  Last administered on 3/24/20at 11:11;  Start 3/23/20 at 

12:00;  Stop 3/24/20 at 11:15;  Status DC


Potassium Chloride 15 meq/ Bicarbonate Dialysis Soln w/ out KCl 5,007.5 ml  @ 

1,000 mls/ hr Q5H1M IV  Last administered on 3/24/20at 11:12;  Start 3/23/20 at 

12:00;  Stop 3/24/20 at 11:17;  Status DC


Potassium Chloride 15 meq/ Bicarbonate Dialysis Soln w/ out KCl 5,007.5 ml  @ 

1,000 mls/ hr Q5H1M IV  Last administered on 3/24/20at 11:11;  Start 3/23/20 at 

12:00;  Stop 3/24/20 at 11:19;  Status DC


Sodium Chloride 90 meq/Potassium Chloride 15 meq/ Potassium Phosphate 10 mmol/ 

Magnesium Sulfate 10 meq/Calcium Gluconate 20 meq/ Multivitamins 10 ml/Chromium/

Copper/Manganese/ Seleni/Zn 0.5 ml/ Total Parenteral Nutrition/Amino 

Acids/Dextrose/ Fat Emulsion Intravenous 1,400 ml @  58.333 mls/ hr TPN  CONT IV

 Last administered on 3/23/20at 21:42;  Start 3/23/20 at 22:00;  Stop 3/24/20 at

21:59;  Status DC


Heparin Sodium (Porcine) (Heparin Sodium) 5,000 unit Q8HRS SQ  Last administered

on 3/28/20at 05:55;  Start 3/23/20 at 15:00;  Stop 3/28/20 at 13:28;  Status DC


Meropenem 500 mg/ Sodium Chloride 50 ml @  100 mls/hr Q6HRS IV  Last 

administered on 3/25/20at 06:00;  Start 3/24/20 at 09:00;  Stop 3/25/20 at 

07:29;  Status DC


Potassium Phosphate 20 mmol/ Sodium Chloride 106.6667 ml @  51.667 m... 1X  ONCE

IV  Last administered on 3/24/20at 11:22;  Start 3/24/20 at 10:15;  Stop 3/24/20

at 12:18;  Status DC


Acetaminophen (Tylenol Supp) 650 mg PRN Q6HRS  PRN CO MILD PAIN / TEMP > 100.3'F

Last administered on 4/27/20at 00:32;  Start 3/24/20 at 10:30


Potassium Chloride/Water 100 ml @  100 mls/hr Q1H IV  Last administered on 

3/24/20at 12:12;  Start 3/24/20 at 11:00;  Stop 3/24/20 at 12:59;  Status DC


Potassium Chloride 20 meq/ Bicarbonate Dialysis Soln w/ out KCl 5,010 ml @  

1,000 mls/hr Q5H1M IV  Last administered on 3/25/20at 08:48;  Start 3/24/20 at 

12:00;  Stop 3/25/20 at 13:03;  Status DC


Potassium Chloride 20 meq/ Bicarbonate Dialysis Soln w/ out KCl 5,010 ml @  

1,000 mls/hr Q5H1M IV  Last administered on 3/29/20at 14:52;  Start 3/24/20 at 

11:30;  Stop 3/29/20 at 19:59;  Status DC


Potassium Chloride 20 meq/ Bicarbonate Dialysis Soln w/ out KCl 5,010 ml @  

1,000 mls/hr Q5H1M IV  Last administered on 3/29/20at 14:53;  Start 3/24/20 at 

11:30;  Stop 3/29/20 at 19:59;  Status DC


Sodium Chloride 90 meq/Potassium Chloride 15 meq/ Potassium Phosphate 15 mmol/ 

Magnesium Sulfate 10 meq/Calcium Gluconate 15 meq/ Multivitamins 10 ml/Chromium/

Copper/Manganese/ Seleni/Zn 0.5 ml/ Total Parenteral Nutrition/Amino 

Acids/Dextrose/ Fat Emulsion Intravenous 1,400 ml @  58.333 mls/ hr TPN  CONT IV

 Last administered on 3/24/20at 22:17;  Start 3/24/20 at 22:00;  Stop 3/25/20 at

21:59;  Status DC


Cefepime HCl (Maxipime) 2 gm Q12HR IVP  Last administered on 4/7/20at 20:56;  

Start 3/25/20 at 09:00;  Stop 4/8/20 at 09:58;  Status DC


Daptomycin 500 mg/ Sodium Chloride 50 ml @  100 mls/hr Q48H IV  Last 

administered on 4/10/20at 09:57;  Start 3/25/20 at 08:30;  Stop 4/10/20 at 

10:07;  Status DC


Lidocaine HCl (Buffered Lidocaine 1%) 3 ml 1X  ONCE INJ  Last administered on 

3/25/20at 10:27;  Start 3/25/20 at 10:30;  Stop 3/25/20 at 10:31;  Status DC


Potassium Phosphate 20 mmol/ Sodium Chloride 106.6667 ml @  51.667 m... 1X  ONCE

IV  Last administered on 3/25/20at 12:51;  Start 3/25/20 at 13:00;  Stop 3/25/20

at 15:03;  Status DC


Sodium Chloride 90 meq/Potassium Chloride 15 meq/ Potassium Phosphate 18 mmol/ 

Magnesium Sulfate 8 meq/Calcium Gluconate 15 meq/ Multivitamins 10 ml/Chromium/ 

Copper/Manganese/ Seleni/Zn 0.5 ml/ Total Parenteral Nutrition/Amino 

Acids/Dextrose/ Fat Emulsion Intravenous 1,400 ml @  58.333 mls/ hr TPN  CONT IV

 Last administered on 3/25/20at 22:16;  Start 3/25/20 at 22:00;  Stop 3/26/20 at

21:59;  Status DC


Potassium Chloride 20 meq/ Bicarbonate Dialysis Soln w/ out KCl 5,010 ml @  

1,000 mls/hr Q5H1M IV  Last administered on 3/29/20at 14:54;  Start 3/25/20 at 

16:00;  Stop 3/29/20 at 19:59;  Status DC


Multi-Ingred Cream/Lotion/Oil/ Oint (Artificial Tears Eye Ointment) 1 thomas PRN 

Q1HR  PRN OU DRY EYE, 2nd choice Last administered on 4/13/20at 08:19;  Start 

3/25/20 at 17:30


Sodium Chloride 90 meq/Potassium Chloride 15 meq/ Potassium Phosphate 18 mmol/ 

Magnesium Sulfate 8 meq/Calcium Gluconate 15 meq/ Multivitamins 10 ml/Chromium/ 

Copper/Manganese/ Seleni/Zn 0.5 ml/ Total Parenteral Nutrition/Amino 

Acids/Dextrose/ Fat Emulsion Intravenous 1,400 ml @  58.333 mls/ hr TPN  CONT IV

 Last administered on 3/26/20at 22:00;  Start 3/26/20 at 22:00;  Stop 3/27/20 at

21:59;  Status DC


Albumin Human 500 ml @  125 mls/hr 1X  ONCE IV ;  Start 3/26/20 at 14:15;  Stop 

3/26/20 at 18:14;  Status DC


Sodium Chloride 90 meq/Potassium Chloride 15 meq/ Potassium Phosphate 18 mmol/ 

Magnesium Sulfate 8 meq/Calcium Gluconate 15 meq/ Multivitamins 10 ml/Chromium/ 

Copper/Manganese/ Seleni/Zn 0.5 ml/ Insulin Human Regular 10 unit/ Total 

Parenteral Nutrition/Amino Acids/Dextrose/ Fat Emulsion Intravenous 1,400 ml @  

58.333 mls/ hr TPN  CONT IV  Last administered on 3/27/20at 21:43;  Start 

3/27/20 at 22:00;  Stop 3/28/20 at 21:59;  Status DC


Lidocaine HCl (Buffered Lidocaine 1%) 3 ml STK-MED ONCE .ROUTE ;  Start 3/25/20 

at 10:00;  Stop 3/27/20 at 13:57;  Status DC


Midazolam HCl 100 mg/Sodium Chloride 100 ml @ 7 mls/hr CONT  PRN IV SEE PROTOCOL

Last administered on 4/8/20at 15:35;  Start 3/28/20 at 16:00


Sodium Chloride 90 meq/Potassium Chloride 15 meq/ Potassium Phosphate 18 mmol/ 

Magnesium Sulfate 8 meq/Calcium Gluconate 15 meq/ Multivitamins 10 ml/Chromium/ 

Copper/Manganese/ Seleni/Zn 0.5 ml/ Insulin Human Regular 15 unit/ Total P

arenteral Nutrition/Amino Acids/Dextrose/ Fat Emulsion Intravenous 1,400 ml @  

58.333 mls/ hr TPN  CONT IV  Last administered on 3/28/20at 20:34;  Start 

3/28/20 at 22:00;  Stop 3/29/20 at 21:59;  Status DC


Info ([facility]cu Electrolyte Protocol) 1 ea CONT PRN  PRN MC PER PROTOCOL;  Start 

3/29/20 at 13:15


Sodium Chloride 90 meq/Potassium Chloride 15 meq/ Potassium Phosphate 18 mmol/ 

Magnesium Sulfate 8 meq/Calcium Gluconate 15 meq/ Multivitamins 10 ml/Chromium/ 

Copper/Manganese/ Seleni/Zn 0.5 ml/ Insulin Human Regular 15 unit/ Total 

Parenteral Nutrition/Amino Acids/Dextrose/ Fat Emulsion Intravenous 1,400 ml @  

58.333 mls/ hr TPN  CONT IV  Last administered on 3/29/20at 22:05;  Start 

3/29/20 at 22:00;  Stop 3/30/20 at 21:59;  Status DC


Potassium Chloride 15 meq/ Bicarbonate Dialysis Soln w/ out KCl 5,007.5 ml  @ 

1,000 mls/ hr Q5H1M IV  Last administered on 4/1/20at 18:14;  Start 3/29/20 at 

20:00;  Stop 4/2/20 at 13:08;  Status DC


Potassium Chloride 15 meq/ Bicarbonate Dialysis Soln w/ out KCl 5,007.5 ml  @ 

1,000 mls/ hr Q5H1M IV  Last administered on 4/1/20at 18:14;  Start 3/29/20 at 

20:00;  Stop 4/2/20 at 13:08;  Status DC


Potassium Chloride 15 meq/ Bicarbonate Dialysis Soln w/ out KCl 5,007.5 ml  @ 

1,000 mls/ hr Q5H1M IV  Last administered on 4/1/20at 18:14;  Start 3/29/20 at 

20:00;  Stop 4/2/20 at 13:08;  Status DC


Iohexol (Omnipaque 240 Mg/ml) 30 ml 1X  ONCE PO  Last administered on 3/30/20at 

11:30;  Start 3/30/20 at 11:30;  Stop 3/30/20 at 11:33;  Status DC


Info (CONTRAST GIVEN -- Rx MONITORING) 1 each PRN DAILY  PRN MC SEE COMMENTS;  

Start 3/30/20 at 11:45;  Stop 4/1/20 at 11:44;  Status DC


Sodium Chloride 90 meq/Potassium Chloride 15 meq/ Potassium Phosphate 18 mmol/ 

Magnesium Sulfate 8 meq/Calcium Gluconate 15 meq/ Multivitamins 10 ml/Chromium/ 

Copper/Manganese/ Seleni/Zn 0.5 ml/ Insulin Human Regular 15 unit/ Total 

Parenteral Nutrition/Amino Acids/Dextrose/ Fat Emulsion Intravenous 1,400 ml @  

58.333 mls/ hr TPN  CONT IV  Last administered on 3/30/20at 21:47;  Start 

3/30/20 at 22:00;  Stop 3/31/20 at 21:59;  Status DC


Sodium Chloride 90 meq/Potassium Chloride 15 meq/ Potassium Phosphate 18 mmol/ 

Magnesium Sulfate 8 meq/Calcium Gluconate 15 meq/ Multivitamins 10 ml/Chromium/ 

Copper/Manganese/ Seleni/Zn 0.5 ml/ Insulin Human Regular 20 unit/ Total 

Parenteral Nutrition/Amino Acids/Dextrose/ Fat Emulsion Intravenous 1,400 ml @  

58.333 mls/ hr TPN  CONT IV  Last administered on 3/31/20at 21:36;  Start 

3/31/20 at 22:00;  Stop 4/1/20 at 21:59;  Status DC


Alteplase, Recombinant (Cathflo For Central Catheter Clearance) 1 mg 1X  ONCE 

INT CAT  Last administered on 3/31/20at 20:03;  Start 3/31/20 at 19:30;  Stop 

3/31/20 at 19:46;  Status DC


Alteplase, Recombinant (Cathflo For Central Catheter Clearance) 1 mg 1X  ONCE 

INT CAT  Last administered on 3/31/20at 22:05;  Start 3/31/20 at 22:00;  Stop 

3/31/20 at 22:01;  Status DC


Sodium Chloride 90 meq/Potassium Chloride 15 meq/ Potassium Phosphate 18 mmol/ 

Magnesium Sulfate 8 meq/Calcium Gluconate 15 meq/ Multivitamins 10 ml/Chromium/ 

Copper/Manganese/ Seleni/Zn 0.5 ml/ Insulin Human Regular 20 unit/ Total 

Parenteral Nutrition/Amino Acids/Dextrose/ Fat Emulsion Intravenous 1,400 ml @  

58.333 mls/ hr TPN  CONT IV  Last administered on 4/1/20at 21:30;  Start 4/1/20 

at 22:00;  Stop 4/2/20 at 21:59;  Status DC


Dexmedetomidine HCl 400 mcg/ Sodium Chloride 100 ml @ 0 mls/hr CONT  PRN IV 

ANXIETY / AGITATION Last administered on 5/1/20at 05:10;  Start 4/2/20 at 08:15


Sodium Chloride 500 ml @  500 mls/hr 1X PRN  PRN IV ELEVATED BP, SEE COMMENTS;  

Start 4/2/20 at 08:15


Atropine Sulfate (ATROPINE 0.5mg SYRINGE) 0.5 mg PRN Q5MIN  PRN IV SEE COMMENTS;

 Start 4/2/20 at 08:15


Furosemide (Lasix) 20 mg 1X  ONCE IVP  Last administered on 4/2/20at 08:19;  

Start 4/2/20 at 08:15;  Stop 4/2/20 at 08:16;  Status DC


Lidocaine HCl (Buffered Lidocaine 1%) 3 ml STK-MED ONCE .ROUTE ;  Start 4/2/20 

at 08:39;  Stop 4/2/20 at 08:39;  Status DC


Lidocaine HCl (Buffered Lidocaine 1%) 6 ml 1X  ONCE INJ  Last administered on 

4/2/20at 09:05;  Start 4/2/20 at 09:00;  Stop 4/2/20 at 09:06;  Status DC


Sodium Chloride 90 meq/Potassium Chloride 15 meq/ Potassium Phosphate 18 mmol/ 

Magnesium Sulfate 8 meq/Calcium Gluconate 15 meq/ Multivitamins 10 ml/Chromium/ 

Copper/Manganese/ Seleni/Zn 0.5 ml/ Insulin Human Regular 20 unit/ Total 

Parenteral Nutrition/Amino Acids/Dextrose/ Fat Emulsion Intravenous 1,400 ml @  

58.333 mls/ hr TPN  CONT IV  Last administered on 4/2/20at 22:45;  Start 4/2/20 

at 22:00;  Stop 4/3/20 at 21:59;  Status DC


Sodium Chloride 1,000 ml @  1,000 mls/hr Q1H PRN IV hypotension;  Start 4/3/20 

at 07:30;  Stop 4/3/20 at 13:29;  Status DC


Albumin Human 200 ml @  200 mls/hr 1X PRN  PRN IV Hypotension Last administered 

on 4/3/20at 09:36;  Start 4/3/20 at 07:30;  Stop 4/3/20 at 13:29;  Status DC


Sodium Chloride (Normal Saline Flush) 10 ml 1X PRN  PRN IV AP catheter pack;  S

tart 4/3/20 at 07:30;  Stop 4/3/20 at 21:29;  Status DC


Sodium Chloride (Normal Saline Flush) 10 ml 1X PRN  PRN IV  catheter pack;  

Start 4/3/20 at 07:30;  Stop 4/4/20 at 07:29;  Status DC


Sodium Chloride 1,000 ml @  400 mls/hr Q2H30M PRN IV PATENCY;  Start 4/3/20 at 

07:30;  Stop 4/3/20 at 19:29;  Status DC


Info (PHARMACY MONITORING -- do not chart) 1 each PRN DAILY  PRN MC SEE C

OMMENTS;  Start 4/3/20 at 07:30;  Stop 4/3/20 at 13:02;  Status DC


Info (PHARMACY MONITORING -- do not chart) 1 each PRN DAILY  PRN MC SEE 

COMMENTS;  Start 4/3/20 at 07:30;  Stop 4/5/20 at 12:45;  Status DC


Sodium Chloride 90 meq/Potassium Chloride 15 meq/ Potassium Phosphate 10 mmol/ 

Magnesium Sulfate 8 meq/Calcium Gluconate 15 meq/ Multivitamins 10 ml/Chromium/ 

Copper/Manganese/ Seleni/Zn 0.5 ml/ Insulin Human Regular 25 unit/ Total 

Parenteral Nutrition/Amino Acids/Dextrose/ Fat Emulsion Intravenous 1,400 ml @  

58.333 mls/ hr TPN  CONT IV  Last administered on 4/3/20at 22:19;  Start 4/3/20 

at 22:00;  Stop 4/4/20 at 21:59;  Status DC


Heparin Sodium (Porcine) (Heparin Sodium) 5,000 unit Q12HR SQ  Last administered

on 4/26/20at 08:59;  Start 4/3/20 at 21:00;  Stop 4/26/20 at 10:05;  Status DC


Ondansetron HCl (Zofran) 4 mg PRN Q6HRS  PRN IV NAUSEA/VOMITING;  Start 4/6/20 

at 07:00;  Stop 4/7/20 at 06:59;  Status DC


Fentanyl Citrate (Fentanyl 2ml Vial) 25 mcg PRN Q5MIN  PRN IV MILD PAIN 1-3;  

Start 4/6/20 at 07:00;  Stop 4/7/20 at 06:59;  Status DC


Fentanyl Citrate (Fentanyl 2ml Vial) 50 mcg PRN Q5MIN  PRN IV MODERATE TO SEVERE

PAIN;  Start 4/6/20 at 07:00;  Stop 4/7/20 at 06:59;  Status DC


Ringer's Solution 1,000 ml @  30 mls/hr Q24H IV ;  Start 4/6/20 at 07:00;  Stop 

4/6/20 at 18:59;  Status DC


Lidocaine HCl (Xylocaine-Mpf 1% 2ml Vial) 2 ml PRN 1X  PRN ID PRIOR TO IV START;

 Start 4/6/20 at 07:00;  Stop 4/7/20 at 06:59;  Status DC


Prochlorperazine Edisylate (Compazine) 5 mg PACU PRN  PRN IV NAUSEA, MRX1;  

Start 4/6/20 at 07:00;  Stop 4/7/20 at 06:59;  Status DC


Sodium Chloride 1,000 ml @  1,000 mls/hr Q1H PRN IV hypotension;  Start 4/4/20 

at 09:10;  Stop 4/4/20 at 15:09;  Status DC


Albumin Human 200 ml @  200 mls/hr 1X PRN  PRN IV Hypotension Last administered 

on 4/4/20at 10:10;  Start 4/4/20 at 09:15;  Stop 4/4/20 at 15:14;  Status DC


Sodium Chloride 1,000 ml @  400 mls/hr Q2H30M PRN IV PATENCY;  Start 4/4/20 at 

09:10;  Stop 4/4/20 at 21:09;  Status DC


Info (PHARMACY MONITORING -- do not chart) 1 each PRN DAILY  PRN MC SEE 

COMMENTS;  Start 4/4/20 at 09:15;  Stop 4/5/20 at 12:45;  Status DC


Info (PHARMACY MONITORING -- do not chart) 1 each PRN DAILY  PRN MC SEE 

COMMENTS;  Start 4/4/20 at 09:15;  Stop 4/5/20 at 12:45;  Status DC


Sodium Chloride 90 meq/Potassium Chloride 15 meq/ Potassium Phosphate 10 mmol/ 

Magnesium Sulfate 8 meq/Calcium Gluconate 15 meq/ Multivitamins 10 ml/Chromium/ 

Copper/Manganese/ Seleni/Zn 0.5 ml/ Insulin Human Regular 25 unit/ Total 

Parenteral Nutrition/Amino Acids/Dextrose/ Fat Emulsion Intravenous 1,400 ml @  

58.333 mls/ hr TPN  CONT IV  Last administered on 4/4/20at 22:10;  Start 4/4/20 

at 22:00;  Stop 4/5/20 at 21:59;  Status DC


Magnesium Sulfate 50 ml @ 25 mls/hr PRN DAILY  PRN IV for Mag < 1.7 on am labs 

Last administered on 4/20/20at 17:27;  Start 4/5/20 at 09:15


Sodium Chloride 90 meq/Potassium Chloride 15 meq/ Potassium Phosphate 10 mmol/ 

Magnesium Sulfate 8 meq/Calcium Gluconate 15 meq/ Multivitamins 10 ml/Chromium/ 

Copper/Manganese/ Seleni/Zn 0.5 ml/ Insulin Human Regular 25 unit/ Total 

Parenteral Nutrition/Amino Acids/Dextrose/ Fat Emulsion Intravenous 1,400 ml @  

58.333 mls/ hr TPN  CONT IV  Last administered on 4/5/20at 21:20;  Start 4/5/20 

at 22:00;  Stop 4/6/20 at 21:59;  Status DC


Sodium Chloride 1,000 ml @  1,000 mls/hr Q1H PRN IV hypotension;  Start 4/5/20 

at 12:23;  Stop 4/5/20 at 18:22;  Status DC


Albumin Human 200 ml @  200 mls/hr 1X  ONCE IV  Last administered on 4/5/20at 

13:34;  Start 4/5/20 at 12:30;  Stop 4/5/20 at 13:29;  Status DC


Diphenhydramine HCl (Benadryl) 25 mg 1X PRN  PRN IV ITCHING;  Start 4/5/20 at 

12:30;  Stop 4/6/20 at 12:29;  Status DC


Diphenhydramine HCl (Benadryl) 25 mg 1X PRN  PRN IV ITCHING;  Start 4/5/20 at 

12:30;  Stop 4/6/20 at 12:29;  Status DC


Info (PHARMACY MONITORING -- do not chart) 1 each PRN DAILY  PRN MC SEE 

COMMENTS;  Start 4/5/20 at 12:30;  Status Cancel


Bupivacaine HCl/ Epinephrine Bitart (Sensorcain-Epi 0.5%-1:019457 Mpf) 30 ml 

STK-MED ONCE .ROUTE  Last administered on 4/6/20at 11:44;  Start 4/6/20 at 

11:00;  Stop 4/6/20 at 11:01;  Status DC


Cellulose (Surgicel Fibrillar 1x2) 1 each STK-MED ONCE .ROUTE ;  Start 4/6/20 at

11:00;  Stop 4/6/20 at 11:01;  Status DC


Sodium Chloride 90 meq/Potassium Chloride 15 meq/ Potassium Phosphate 10 mmol/ 

Magnesium Sulfate 12 meq/Calcium Gluconate 15 meq/ Multivitamins 10 ml/Chromium/

Copper/Manganese/ Seleni/Zn 0.5 ml/ Insulin Human Regular 25 unit/ Total 

Parenteral Nutrition/Amino Acids/Dextrose/ Fat Emulsion Intravenous 1,400 ml @  

58.333 mls/ hr TPN  CONT IV  Last administered on 4/6/20at 22:24;  Start 4/6/20 

at 22:00;  Stop 4/7/20 at 21:59;  Status DC


Propofol 20 ml @ As Directed STK-MED ONCE IV ;  Start 4/6/20 at 11:07;  Stop 

4/6/20 at 11:07;  Status DC


Cellulose (Surgicel Hemostat 4x8) 1 each STK-MED ONCE .ROUTE  Last administered 

on 4/6/20at 11:44;  Start 4/6/20 at 11:55;  Stop 4/6/20 at 11:56;  Status DC


Sevoflurane (Ultane) 60 ml STK-MED ONCE IH ;  Start 4/6/20 at 12:46;  Stop 

4/6/20 at 12:46;  Status DC


Sodium Chloride 1,000 ml @  1,000 mls/hr Q1H PRN IV hypotension;  Start 4/6/20 

at 13:51;  Stop 4/6/20 at 19:50;  Status DC


Albumin Human 200 ml @  200 mls/hr 1X PRN  PRN IV Hypotension Last administered 

on 4/6/20at 14:51;  Start 4/6/20 at 14:00;  Stop 4/6/20 at 19:59;  Status DC


Diphenhydramine HCl (Benadryl) 25 mg 1X PRN  PRN IV ITCHING;  Start 4/6/20 at 

14:00;  Stop 4/7/20 at 13:59;  Status DC


Diphenhydramine HCl (Benadryl) 25 mg 1X PRN  PRN IV ITCHING;  Start 4/6/20 at 

14:00;  Stop 4/7/20 at 13:59;  Status DC


Sodium Chloride 1,000 ml @  400 mls/hr Q2H30M PRN IV PATENCY;  Start 4/6/20 at 

13:51;  Stop 4/7/20 at 01:50;  Status DC


Info (PHARMACY MONITORING -- do not chart) 1 each PRN DAILY  PRN MC SEE 

COMMENTS;  Start 4/6/20 at 14:00;  Stop 4/9/20 at 08:16;  Status DC


Heparin Sodium (Porcine) (Hep Lock Adult) 500 unit STK-MED ONCE IVP ;  Start 

4/7/20 at 09:29;  Stop 4/7/20 at 09:30;  Status DC


Sodium Chloride 1,000 ml @  1,000 mls/hr Q1H PRN IV hypotension;  Start 4/7/20 

at 10:43;  Stop 4/7/20 at 16:42;  Status DC


Sodium Chloride 1,000 ml @  400 mls/hr Q2H30M PRN IV PATENCY;  Start 4/7/20 at 

10:43;  Stop 4/7/20 at 22:42;  Status DC


Info (PHARMACY MONITORING -- do not chart) 1 each PRN DAILY  PRN MC SEE 

COMMENTS;  Start 4/7/20 at 10:45;  Status UNV


Info (PHARMACY MONITORING -- do not chart) 1 each PRN DAILY  PRN MC SEE 

COMMENTS;  Start 4/7/20 at 10:45;  Status UNV


Sodium Chloride 90 meq/Potassium Chloride 15 meq/ Magnesium Sulfate 12 

meq/Calcium Gluconate 15 meq/ Multivitamins 10 ml/Chromium/ Copper/Manganese/ 

Seleni/Zn 0.5 ml/ Insulin Human Regular 25 unit/ Total Parenteral 

Nutrition/Amino Acids/Dextrose/ Fat Emulsion Intravenous 1,400 ml @  58.333 mls/

hr TPN  CONT IV  Last administered on 4/7/20at 22:13;  Start 4/7/20 at 22:00;  

Stop 4/8/20 at 21:59;  Status DC


Sodium Chloride 1,000 ml @  1,000 mls/hr Q1H PRN IV hypotension;  Start 4/8/20 

at 07:50;  Stop 4/8/20 at 13:49;  Status DC


Albumin Human 200 ml @  200 mls/hr 1X  ONCE IV ;  Start 4/8/20 at 08:00;  Stop 

4/8/20 at 08:53;  Status DC


Diphenhydramine HCl (Benadryl) 25 mg 1X PRN  PRN IV ITCHING;  Start 4/8/20 at 

08:00;  Stop 4/9/20 at 07:59;  Status DC


Diphenhydramine HCl (Benadryl) 25 mg 1X PRN  PRN IV ITCHING;  Start 4/8/20 at 

08:00;  Stop 4/9/20 at 07:59;  Status DC


Info (PHARMACY MONITORING -- do not chart) 1 each PRN DAILY  PRN MC SEE 

COMMENTS;  Start 4/8/20 at 08:00;  Stop 4/9/20 at 08:16;  Status DC


Albumin Human 50 ml @ 50 mls/hr 1X  ONCE IV ;  Start 4/8/20 at 08:53;  Stop 

4/8/20 at 08:56;  Status DC


Albumin Human 200 ml @  50 mls/hr PRN 1X  PRN IV HYPOTENSION Last administered 

on 4/14/20at 11:54;  Start 4/8/20 at 09:00


Meropenem 500 mg/ Sodium Chloride 50 ml @  100 mls/hr Q12H IV  Last administered

on 4/28/20at 10:45;  Start 4/8/20 at 10:00;  Stop 4/28/20 at 12:37;  Status DC


Sodium Chloride 90 meq/Magnesium Sulfate 12 meq/ Calcium Gluconate 15 meq/ 

Multivitamins 10 ml/Chromium/ Copper/Manganese/ Seleni/Zn 0.5 ml/ Insulin Human 

Regular 25 unit/ Total Parenteral Nutrition/Amino Acids/Dextrose/ Fat Emulsion 

Intravenous 1,400 ml @  58.333 mls/ hr TPN  CONT IV  Last administered on 

4/8/20at 21:41;  Start 4/8/20 at 22:00;  Stop 4/9/20 at 21:59;  Status DC


Sodium Chloride 1,000 ml @  1,000 mls/hr Q1H PRN IV hypotension;  Start 4/9/20 

at 07:58;  Stop 4/9/20 at 13:57;  Status DC


Albumin Human 200 ml @  200 mls/hr 1X PRN  PRN IV Hypotension Last administered 

on 4/9/20at 09:30;  Start 4/9/20 at 08:00;  Stop 4/9/20 at 13:59;  Status DC


Sodium Chloride 1,000 ml @  400 mls/hr Q2H30M PRN IV PATENCY;  Start 4/9/20 at 

07:58;  Stop 4/9/20 at 19:57;  Status DC


Info (PHARMACY MONITORING -- do not chart) 1 each PRN DAILY  PRN MC SEE 

COMMENTS;  Start 4/9/20 at 08:00;  Status Cancel


Info (PHARMACY MONITORING -- do not chart) 1 each PRN DAILY  PRN MC SEE 

COMMENTS;  Start 4/9/20 at 08:15;  Status UNV


Sodium Chloride 90 meq/Potassium Phosphate 5 mmol/ Magnesium Sulfate 12 

meq/Calcium Gluconate 15 meq/ Multivitamins 10 ml/Chromium/ Copper/Manganese/ 

Seleni/Zn 0.5 ml/ Insulin Human Regular 30 unit/ Total Parenteral 

Nutrition/Amino Acids/Dextrose/ Fat Emulsion Intravenous 1,400 ml @  58.333 mls/

hr TPN  CONT IV  Last administered on 4/9/20at 22:08;  Start 4/9/20 at 22:00;  

Stop 4/10/20 at 21:59;  Status DC


Linezolid/Dextrose 300 ml @  300 mls/hr Q12HR IV  Last administered on 4/20/20at

20:40;  Start 4/10/20 at 11:00;  Stop 4/21/20 at 08:10;  Status DC


Sodium Chloride 90 meq/Potassium Phosphate 15 mmol/ Magnesium Sulfate 12 

meq/Calcium Gluconate 15 meq/ Multivitamins 10 ml/Chromium/ Copper/Manganese/ 

Seleni/Zn 0.5 ml/ Insulin Human Regular 30 unit/ Total Parenteral 

Nutrition/Amino Acids/Dextrose/ Fat Emulsion Intravenous 1,400 ml @  58.333 mls/

hr TPN  CONT IV  Last administered on 4/10/20at 21:49;  Start 4/10/20 at 22:00; 

Stop 4/11/20 at 21:59;  Status DC


Sodium Chloride 90 meq/Potassium Phosphate 15 mmol/ Magnesium Sulfate 12 

meq/Calcium Gluconate 15 meq/ Multivitamins 10 ml/Chromium/ Copper/Manganese/ 

Seleni/Zn 0.5 ml/ Insulin Human Regular 40 unit/ Total Parenteral 

Nutrition/Amino Acids/Dextrose/ Fat Emulsion Intravenous 1,400 ml @  58.333 mls/

hr TPN  CONT IV  Last administered on 4/11/20at 21:21;  Start 4/11/20 at 22:00; 

Stop 4/12/20 at 21:59;  Status DC


Sodium Chloride 1,000 ml @  1,000 mls/hr Q1H PRN IV hypotension;  Start 4/11/20 

at 13:26;  Stop 4/11/20 at 19:25;  Status DC


Albumin Human 200 ml @  200 mls/hr 1X PRN  PRN IV Hypotension Last administered 

on 4/11/20at 15:00;  Start 4/11/20 at 13:30;  Stop 4/11/20 at 19:29;  Status DC


Sodium Chloride (Normal Saline Flush) 10 ml 1X PRN  PRN IV AP catheter pack;  

Start 4/11/20 at 13:30;  Stop 4/12/20 at 13:29;  Status DC


Sodium Chloride (Normal Saline Flush) 10 ml 1X PRN  PRN IV  catheter pack;  

Start 4/11/20 at 13:30;  Stop 4/12/20 at 13:29;  Status DC


Sodium Chloride 1,000 ml @  400 mls/hr Q2H30M PRN IV PATENCY;  Start 4/11/20 at 

13:26;  Stop 4/12/20 at 01:25;  Status DC


Info (PHARMACY MONITORING -- do not chart) 1 each PRN DAILY  PRN MC SEE 

COMMENTS;  Start 4/11/20 at 13:30;  Stop 4/11/20 at 13:33;  Status DC


Info (PHARMACY MONITORING -- do not chart) 1 each PRN DAILY  PRN MC SEE 

COMMENTS;  Start 4/11/20 at 13:30;  Stop 4/11/20 at 13:34;  Status DC


Sodium Chloride 90 meq/Potassium Phosphate 19 mmol/ Magnesium Sulfate 12 

meq/Calcium Gluconate 15 meq/ Multivitamins 10 ml/Chromium/ Copper/Manganese/ 

Seleni/Zn 0.5 ml/ Insulin Human Regular 40 unit/ Total Parenteral 

Nutrition/Amino Acids/Dextrose/ Fat Emulsion Intravenous 1,400 ml @  58.333 mls/

hr TPN  CONT IV  Last administered on 4/12/20at 21:54;  Start 4/12/20 at 22:00; 

Stop 4/13/20 at 21:59;  Status DC


Sodium Chloride 1,000 ml @  1,000 mls/hr Q1H PRN IV hypotension;  Start 4/13/20 

at 09:35;  Stop 4/13/20 at 15:34;  Status DC


Albumin Human 200 ml @  200 mls/hr 1X PRN  PRN IV Hypotension;  Start 4/13/20 at

09:45;  Stop 4/13/20 at 15:44;  Status DC


Diphenhydramine HCl (Benadryl) 25 mg 1X PRN  PRN IV ITCHING;  Start 4/13/20 at 

09:45;  Stop 4/14/20 at 09:44;  Status DC


Diphenhydramine HCl (Benadryl) 25 mg 1X PRN  PRN IV ITCHING;  Start 4/13/20 at 

09:45;  Stop 4/14/20 at 09:44;  Status DC


Sodium Chloride 1,000 ml @  400 mls/hr Q2H30M PRN IV PATENCY;  Start 4/13/20 at 

09:35;  Stop 4/13/20 at 21:34;  Status DC


Info (PHARMACY MONITORING -- do not chart) 1 each PRN DAILY  PRN MC SEE 

COMMENTS;  Start 4/13/20 at 09:45;  Status Cancel


Sodium Chloride 100 meq/Potassium Phosphate 19 mmol/ Magnesium Sulfate 12 

meq/Calcium Gluconate 15 meq/ Multivitamins 10 ml/Chromium/ Copper/Manganese/ 

Seleni/Zn 0.5 ml/ Insulin Human Regular 40 unit/ Potassium Chloride 20 meq/ 

Total Parenteral Nutrition/Amino Acids/Dextrose/ Fat Emulsion Intravenous 1,400 

ml @  58.333 mls/ hr TPN  CONT IV  Last administered on 4/13/20at 22:02;  Start 

4/13/20 at 22:00;  Stop 4/14/20 at 21:59;  Status DC


Furosemide (Lasix) 40 mg 1X  ONCE IVP  Last administered on 4/13/20at 14:39;  

Start 4/13/20 at 14:30;  Stop 4/13/20 at 14:31;  Status DC


Metronidazole 100 ml @  100 mls/hr Q8HRS IV  Last administered on 4/21/20at 

06:04;  Start 4/14/20 at 10:00;  Stop 4/21/20 at 08:10;  Status DC


Sodium Chloride 1,000 ml @  1,000 mls/hr Q1H PRN IV hypotension;  Start 4/14/20 

at 08:00;  Stop 4/14/20 at 13:59;  Status DC


Albumin Human 200 ml @  200 mls/hr 1X PRN  PRN IV Hypotension;  Start 4/14/20 at

08:00;  Stop 4/14/20 at 13:59;  Status DC


Sodium Chloride 1,000 ml @  400 mls/hr Q2H30M PRN IV PATENCY;  Start 4/14/20 at 

08:00;  Stop 4/14/20 at 19:59;  Status DC


Info (PHARMACY MONITORING -- do not chart) 1 each PRN DAILY  PRN MC SEE 

COMMENTS;  Start 4/14/20 at 11:30;  Status UNV


Info (PHARMACY MONITORING -- do not chart) 1 each PRN DAILY  PRN MC SEE 

COMMENTS;  Start 4/14/20 at 11:30;  Stop 4/16/20 at 12:13;  Status DC


Sodium Chloride 100 meq/Potassium Phosphate 19 mmol/ Magnesium Sulfate 12 

meq/Calcium Gluconate 15 meq/ Multivitamins 10 ml/Chromium/ Copper/Manganese/ 

Seleni/Zn 0.5 ml/ Insulin Human Regular 40 unit/ Potassium Chloride 20 meq/ 

Total Parenteral Nutrition/Amino Acids/Dextrose/ Fat Emulsion Intravenous 1,400 

ml @  58.333 mls/ hr TPN  CONT IV  Last administered on 4/14/20at 21:52;  Start 

4/14/20 at 22:00;  Stop 4/15/20 at 21:59;  Status DC


Sodium Chloride (Normal Saline Flush) 10 ml QSHIFT  PRN IV AFTER MEDS AND BLOOD 

DRAWS;  Start 4/14/20 at 15:00


Sodium Chloride (Normal Saline Flush) 10 ml PRN Q5MIN  PRN IV AFTER MEDS AND 

BLOOD DRAWS;  Start 4/14/20 at 15:00


Sodium Chloride (Normal Saline Flush) 20 ml PRN Q5MIN  PRN IV AFTER MEDS AND 

BLOOD DRAWS;  Start 4/14/20 at 15:00


Sodium Chloride 100 meq/Potassium Phosphate 19 mmol/ Magnesium Sulfate 12 

meq/Calcium Gluconate 15 meq/ Multivitamins 10 ml/Chromium/ Copper/Manganese/ 

Seleni/Zn 0.5 ml/ Insulin Human Regular 40 unit/ Potassium Chloride 20 meq/ 

Total Parenteral Nutrition/Amino Acids/Dextrose/ Fat Emulsion Intravenous 1,400 

ml @  58.333 mls/ hr TPN  CONT IV  Last administered on 4/15/20at 21:20;  Start 

4/15/20 at 22:00;  Stop 4/16/20 at 21:59;  Status DC


Lidocaine HCl (Buffered Lidocaine 1%) 3 ml STK-MED ONCE .ROUTE ;  Start 4/15/20 

at 13:16;  Stop 4/15/20 at 13:16;  Status DC


Lidocaine HCl (Buffered Lidocaine 1%) 6 ml 1X  ONCE INJ  Last administered on 

4/15/20at 13:45;  Start 4/15/20 at 13:30;  Stop 4/15/20 at 13:31;  Status DC


Albumin Human 100 ml @  100 mls/hr 1X  ONCE IV  Last administered on 4/15/20at 

15:41;  Start 4/15/20 at 15:00;  Stop 4/15/20 at 15:59;  Status DC


Albumin Human 50 ml @ 50 mls/hr 1X  ONCE IV  Last administered on 4/15/20at 

15:00;  Start 4/15/20 at 15:00;  Stop 4/15/20 at 15:59;  Status DC


Info (PHARMACY MONITORING -- do not chart) 1 each PRN DAILY  PRN MC SEE 

COMMENTS;  Start 4/16/20 at 11:30;  Status Cancel


Info (PHARMACY MONITORING -- do not chart) 1 each PRN DAILY  PRN MC SEE 

COMMENTS;  Start 4/16/20 at 11:30;  Status UNV


Sodium Chloride 100 meq/Potassium Phosphate 10 mmol/ Magnesium Sulfate 12 

meq/Calcium Gluconate 15 meq/ Multivitamins 10 ml/Chromium/ Copper/Manganese/ 

Seleni/Zn 0.5 ml/ Insulin Human Regular 35 unit/ Potassium Chloride 20 meq/ 

Total Parenteral Nutrition/Amino Acids/Dextrose/ Fat Emulsion Intravenous 1,400 

ml @  58.333 mls/ hr TPN  CONT IV  Last administered on 4/16/20at 22:10;  Start 

4/16/20 at 22:00;  Stop 4/17/20 at 21:59;  Status DC


Sodium Chloride 100 meq/Potassium Phosphate 5 mmol/ Magnesium Sulfate 12 

meq/Calcium Gluconate 15 meq/ Multivitamins 10 ml/Chromium/ Copper/Manganese/ 

Seleni/Zn 0.5 ml/ Insulin Human Regular 35 unit/ Potassium Chloride 20 meq/ 

Total Parenteral Nutrition/Amino Acids/Dextrose/ Fat Emulsion Intravenous 1,400 

ml @  58.333 mls/ hr TPN  CONT IV  Last administered on 4/17/20at 22:59;  Start 

4/17/20 at 22:00;  Stop 4/18/20 at 21:59;  Status DC


Sodium Chloride 1,000 ml @  1,000 mls/hr Q1H PRN IV hypotension;  Start 4/18/20 

at 08:27;  Stop 4/18/20 at 14:26;  Status DC


Albumin Human 200 ml @  200 mls/hr 1X PRN  PRN IV Hypotension Last administered 

on 4/18/20at 09:18;  Start 4/18/20 at 08:30;  Stop 4/18/20 at 14:29;  Status DC


Sodium Chloride 1,000 ml @  400 mls/hr Q2H30M PRN IV PATENCY;  Start 4/18/20 at 

08:27;  Stop 4/18/20 at 20:26;  Status DC


Info (PHARMACY MONITORING -- do not chart) 1 each PRN DAILY  PRN MC SEE 

COMMENTS;  Start 4/18/20 at 08:30;  Status Cancel


Info (PHARMACY MONITORING -- do not chart) 1 each PRN DAILY  PRN MC SEE 

COMMENTS;  Start 4/18/20 at 08:30;  Stop 4/26/20 at 13:10;  Status DC


Sodium Chloride 100 meq/Potassium Chloride 40 meq/ Magnesium Sulfate 15 

meq/Calcium Gluconate 15 meq/ Multivitamins 10 ml/Chromium/ Copper/Manganese/ 

Seleni/Zn 0.5 ml/ Insulin Human Regular 35 unit/ Total Parenteral 

Nutrition/Amino Acids/Dextrose/ Fat Emulsion Intravenous 1,400 ml @  58.333 mls/

hr TPN  CONT IV  Last administered on 4/18/20at 22:00;  Start 4/18/20 at 22:00; 

Stop 4/19/20 at 21:59;  Status DC


Potassium Chloride/Water 100 ml @  100 mls/hr 1X  ONCE IV  Last administered on 

4/18/20at 17:28;  Start 4/18/20 at 14:45;  Stop 4/18/20 at 15:44;  Status DC


Sodium Chloride 100 meq/Potassium Chloride 40 meq/ Magnesium Sulfate 15 

meq/Calcium Gluconate 15 meq/ Multivitamins 10 ml/Chromium/ Copper/Manganese/ 

Seleni/Zn 0.5 ml/ Insulin Human Regular 35 unit/ Total Parenteral Nutr

ition/Amino Acids/Dextrose/ Fat Emulsion Intravenous 1,400 ml @  58.333 mls/ hr 

TPN  CONT IV  Last administered on 4/19/20at 22:46;  Start 4/19/20 at 22:00;  

Stop 4/20/20 at 21:59;  Status DC


Sodium Chloride 100 meq/Potassium Chloride 40 meq/ Magnesium Sulfate 20 

meq/Calcium Gluconate 15 meq/ Multivitamins 10 ml/Chromium/ Copper/Manganese/ 

Seleni/Zn 0.5 ml/ Insulin Human Regular 35 unit/ Total Parenteral 

Nutrition/Amino Acids/Dextrose/ Fat Emulsion Intravenous 1,400 ml @  58.333 mls/

hr TPN  CONT IV  Last administered on 4/20/20at 22:31;  Start 4/20/20 at 22:00; 

Stop 4/21/20 at 21:59;  Status DC


Fentanyl Citrate (Fentanyl 2ml Vial) 50 mcg PRN Q2HR  PRN IVP PAIN Last 

administered on 4/27/20at 13:32;  Start 4/20/20 at 21:00;  Stop 4/28/20 at 

12:53;  Status DC


Fentanyl Citrate (Fentanyl 2ml Vial) 25 mcg PRN Q2HR  PRN IVP PAIN;  Start 

4/20/20 at 21:00;  Stop 4/28/20 at 12:54;  Status DC


Enoxaparin Sodium (Lovenox 100mg Syringe) 100 mg Q12HR SQ ;  Start 4/21/20 at 

21:00;  Status UNV


Amino Acids/ Glycerin/ Electrolytes 1,000 ml @  75 mls/hr [id number] IV ;  Start 

4/20/20 at 21:15;  Status UNV


Sodium Chloride 1,000 ml @  1,000 mls/hr Q1H PRN IV hypotension;  Start 4/21/20 

at 07:56;  Stop 4/21/20 at 13:55;  Status DC


Albumin Human 200 ml @  200 mls/hr 1X PRN  PRN IV Hypotension Last administered 

on 4/21/20at 08:40;  Start 4/21/20 at 08:00;  Stop 4/21/20 at 13:59;  Status DC


Sodium Chloride 1,000 ml @  400 mls/hr Q2H30M PRN IV PATENCY;  Start 4/21/20 at 

07:56;  Stop 4/21/20 at 19:55;  Status DC


Info (PHARMACY MONITORING -- do not chart) 1 each PRN DAILY  PRN MC SEE 

COMMENTS;  Start 4/21/20 at 08:00;  Status UNV


Info (PHARMACY MONITORING -- do not chart) 1 each PRN DAILY  PRN MC SEE 

COMMENTS;  Start 4/21/20 at 08:00;  Status UNV


Daptomycin 430 mg/ Sodium Chloride 50 ml @  100 mls/hr Q24H IV  Last 

administered on 4/21/20at 12:35;  Start 4/21/20 at 09:00;  Stop 4/21/20 at 

12:49;  Status DC


Sodium Chloride 100 meq/Potassium Chloride 40 meq/ Magnesium Sulfate 20 

meq/Calcium Gluconate 15 meq/ Multivitamins 10 ml/Chromium/ Copper/Manganese/ 

Seleni/Zn 0.5 ml/ Insulin Human Regular 35 unit/ Total Parenteral 

Nutrition/Amino Acids/Dextrose/ Fat Emulsion Intravenous 1,400 ml @  58.333 mls/

hr TPN  CONT IV  Last administered on 4/21/20at 21:26;  Start 4/21/20 at 22:00; 

Stop 4/22/20 at 21:59;  Status DC


Daptomycin 430 mg/ Sodium Chloride 50 ml @  100 mls/hr Q48H IV ;  Start 4/23/20 

at 09:00;  Stop 4/22/20 at 11:55;  Status DC


Sodium Chloride 100 meq/Potassium Chloride 40 meq/ Magnesium Sulfate 20 

meq/Calcium Gluconate 15 meq/ Multivitamins 10 ml/Chromium/ Copper/Manganese/ 

Seleni/Zn 0.5 ml/ Insulin Human Regular 35 unit/ Total Parenteral 

Nutrition/Amino Acids/Dextrose/ Fat Emulsion Intravenous 1,400 ml @  58.333 mls/

hr TPN  CONT IV  Last administered on 4/22/20at 22:27;  Start 4/22/20 at 22:00; 

Stop 4/23/20 at 21:59;  Status DC


Daptomycin 430 mg/ Sodium Chloride 50 ml @  100 mls/hr Q24H IV  Last 

administered on 4/24/20at 15:07;  Start 4/22/20 at 13:00;  Stop 4/25/20 at 

13:15;  Status DC


Sodium Chloride 100 meq/Potassium Chloride 40 meq/ Magnesium Sulfate 20 

meq/Calcium Gluconate 10 meq/ Multivitamins 10 ml/Chromium/ Copper/Manganese/ 

Seleni/Zn 0.5 ml/ Insulin Human Regular 35 unit/ Total Parenteral 

Nutrition/Amino Acids/Dextrose/ Fat Emulsion Intravenous 1,400 ml @  58.333 mls/

hr TPN  CONT IV  Last administered on 4/24/20at 00:06;  Start 4/23/20 at 22:00; 

Stop 4/24/20 at 21:59;  Status DC


Alteplase, Recombinant (Cathflo For Central Catheter Clearance) 1 mg 1X  ONCE 

INT CAT  Last administered on 4/24/20at 11:44;  Start 4/24/20 at 10:45;  Stop 

4/24/20 at 10:46;  Status DC


Ondansetron HCl (Zofran) 4 mg PRN Q6HRS  PRN IV NAUSEA/VOMITING;  Start 4/27/20 

at 07:00;  Stop 4/28/20 at 06:59;  Status DC


Fentanyl Citrate (Fentanyl 2ml Vial) 25 mcg PRN Q5MIN  PRN IV MILD PAIN 1-3;  

Start 4/27/20 at 07:00;  Stop 4/28/20 at 06:59;  Status DC


Fentanyl Citrate (Fentanyl 2ml Vial) 50 mcg PRN Q5MIN  PRN IV MODERATE TO SEVERE

PAIN Last administered on 4/27/20at 10:17;  Start 4/27/20 at 07:00;  Stop 

4/28/20 at 06:59;  Status DC


Ringer's Solution 1,000 ml @  30 mls/hr Q24H IV ;  Start 4/27/20 at 07:00;  Stop

4/27/20 at 18:59;  Status DC


Lidocaine HCl (Xylocaine-Mpf 1% 2ml Vial) 2 ml PRN 1X  PRN ID PRIOR TO IV START;

 Start 4/27/20 at 07:00;  Stop 4/28/20 at 06:59;  Status DC


Prochlorperazine Edisylate (Compazine) 5 mg PACU PRN  PRN IV NAUSEA, MRX1;  

Start 4/27/20 at 07:00;  Stop 4/28/20 at 06:59;  Status DC


Sodium Acetate 50 meq/Potassium Acetate 55 meq/ Magnesium Sulfate 20 meq/Calcium

Gluconate 10 meq/ Multivitamins 10 ml/Chromium/ Copper/Manganese/ Seleni/Zn 0.5 

ml/ Insulin Human Regular 35 unit/ Total Parenteral Nutrition/Amino 

Acids/Dextrose/ Fat Emulsion Intravenous 1,400 ml @  58.333 mls/ hr TPN  CONT IV

;  Start 4/24/20 at 22:00;  Stop 4/24/20 at 14:15;  Status DC


Sodium Acetate 50 meq/Potassium Acetate 55 meq/ Magnesium Sulfate 20 meq/Calcium

Gluconate 10 meq/ Multivitamins 10 ml/Chromium/ Copper/Manganese/ Seleni/Zn 0.5 

ml/ Insulin Human Regular 35 unit/ Total Parenteral Nutrition/Amino 

Acids/Dextrose/ Fat Emulsion Intravenous 1,800 ml @  75 mls/hr TPN  CONT IV  

Last administered on 4/24/20at 22:38;  Start 4/24/20 at 22:00;  Stop 4/25/20 at 

21:59;  Status DC


Sodium Chloride 1,000 ml @  1,000 mls/hr Q1H PRN IV hypotension;  Start 4/24/20 

at 15:31;  Stop 4/24/20 at 21:30;  Status DC


Diphenhydramine HCl (Benadryl) 25 mg 1X PRN  PRN IV ITCHING;  Start 4/24/20 at 

15:45;  Stop 4/25/20 at 15:44;  Status DC


Diphenhydramine HCl (Benadryl) 25 mg 1X PRN  PRN IV ITCHING;  Start 4/24/20 at 

15:45;  Stop 4/25/20 at 15:44;  Status DC


Sodium Chloride 1,000 ml @  400 mls/hr Q2H30M PRN IV PATENCY;  Start 4/24/20 at 

15:31;  Stop 4/25/20 at 03:30;  Status DC


Info (PHARMACY MONITORING -- do not chart) 1 each PRN DAILY  PRN MC SEE 

COMMENTS;  Start 4/24/20 at 15:45


Sodium Acetate 50 meq/Potassium Acetate 55 meq/ Magnesium Sulfate 20 meq/Calcium

Gluconate 10 meq/ Multivitamins 10 ml/Chromium/ Copper/Manganese/ Seleni/Zn 0.5 

ml/ Insulin Human Regular 35 unit/ Total Parenteral Nutrition/Amino 

Acids/Dextrose/ Fat Emulsion Intravenous 1,800 ml @  75 mls/hr TPN  CONT IV  

Last administered on 4/25/20at 22:03;  Start 4/25/20 at 22:00;  Stop 4/26/20 at 

21:59;  Status DC


Daptomycin 430 mg/ Sodium Chloride 50 ml @  100 mls/hr Q24H IV  Last 

administered on 4/30/20at 13:00;  Start 4/25/20 at 13:00;  Stop 4/30/20 at 

20:58;  Status DC


Heparin Sodium (Porcine) 1000 unit/Sodium Chloride 1,001 ml @  1,001 mls/hr 1X  

ONCE IRR ;  Start 4/27/20 at 06:00;  Stop 4/27/20 at 06:59;  Status DC


Potassium Acetate 55 meq/Magnesium Sulfate 20 meq/ Calcium Gluconate 10 meq/ 

Multivitamins 10 ml/Chromium/ Copper/Manganese/ Seleni/Zn 0.5 ml/ Insulin Human 

Regular 35 unit/ Total Parenteral Nutrition/Amino Acids/Dextrose/ Fat Emulsion 

Intravenous 1,920 ml @  80 mls/hr TPN  CONT IV  Last administered on 4/26/20at 

22:10;  Start 4/26/20 at 22:00;  Stop 4/27/20 at 21:59;  Status DC


Dexamethasone Sodium Phosphate (Decadron) 4 mg STK-MED ONCE .ROUTE ;  Start 

4/27/20 at 10:56;  Stop 4/27/20 at 10:57;  Status DC


Ondansetron HCl (Zofran) 4 mg STK-MED ONCE .ROUTE ;  Start 4/27/20 at 10:56;  

Stop 4/27/20 at 10:57;  Status DC


Rocuronium Bromide (Zemuron) 50 mg STK-MED ONCE .ROUTE ;  Start 4/27/20 at 

10:56;  Stop 4/27/20 at 10:57;  Status DC


Fentanyl Citrate (Fentanyl 2ml Vial) 100 mcg STK-MED ONCE .ROUTE ;  Start 

4/27/20 at 10:56;  Stop 4/27/20 at 10:57;  Status DC


Bupivacaine HCl/ Epinephrine Bitart (Sensorcain-Epi 0.5%-1:[id number] Mpf) 30 ml 

STK-MED ONCE .ROUTE  Last administered on 4/27/20at 12:01;  Start 4/27/20 at 

10:58;  Stop 4/27/20 at 10:58;  Status DC


Cellulose (Surgicel Hemostat 2x14) 1 each STK-MED ONCE .ROUTE ;  Start 4/27/20 a

t 10:58;  Stop 4/27/20 at 10:59;  Status DC


Iohexol (Omnipaque 300 Mg/ml) 50 ml STK-MED ONCE .ROUTE ;  Start 4/27/20 at 

10:58;  Stop 4/27/20 at 10:59;  Status DC


Cellulose (Surgicel Hemostat 4x8) 1 each STK-MED ONCE .ROUTE ;  Start 4/27/20 at

10:58;  Stop 4/27/20 at 10:59;  Status DC


Bisacodyl (Dulcolax Supp) 10 mg STK-MED ONCE .ROUTE ;  Start 4/27/20 at 10:59;  

Stop 4/27/20 at 10:59;  Status DC


Heparin Sodium (Porcine) 1000 unit/Sodium Chloride 1,001 ml @  1,001 mls/hr 1X  

ONCE IRR ;  Start 4/27/20 at 12:00;  Stop 4/27/20 at 12:59;  Status DC


Propofol 20 ml @ As Directed STK-MED ONCE IV ;  Start 4/27/20 at 11:05;  Stop 

4/27/20 at 11:05;  Status DC


Sevoflurane (Ultane) 90 ml STK-MED ONCE IH ;  Start 4/27/20 at 11:05;  Stop 

4/27/20 at 11:05;  Status DC


Sevoflurane (Ultane) 60 ml STK-MED ONCE IH ;  Start 4/27/20 at 12:26;  Stop 

4/27/20 at 12:27;  Status DC


Propofol 20 ml @ As Directed STK-MED ONCE IV ;  Start 4/27/20 at 12:26;  Stop 

4/27/20 at 12:27;  Status DC


Phenylephrine HCl (PHENYLEPHRINE in 0.9% NACL PF) 1 mg STK-MED ONCE IV ;  Start 

4/27/20 at 12:34;  Stop 4/27/20 at 12:34;  Status DC


Heparin Sodium (Porcine) (Heparin Sodium) 5,000 unit Q12HR SQ  Last administered

on 4/30/20at 21:22;  Start 4/27/20 at 21:00


Sodium Chloride (Normal Saline Flush) 3 ml QSHIFT  PRN IV AFTER MEDS AND BLOOD 

DRAWS;  Start 4/27/20 at 13:45


Naloxone HCl (Narcan) 0.4 mg PRN Q2MIN  PRN IV SEE INSTRUCTIONS;  Start 4/27/20 

at 13:45


Sodium Chloride 1,000 ml @  25 mls/hr Q24H IV  Last administered on 4/30/20at 

13:01;  Start 4/27/20 at 13:37


Naloxone HCl (Narcan) 0.4 mg PRN Q2MIN  PRN IV SEE INSTRUCTIONS;  Start 4/27/20 

at 14:30;  Status UNV


Sodium Chloride 1,000 ml @  25 mls/hr Q24H IV ;  Start 4/27/20 at 14:30;  Status

UNV


Hydromorphone HCl 30 ml @ 0 mls/hr CONT PRN  PRN IV PER PROTOCOL Last 

administered on 4/30/20at 17:20;  Start 4/27/20 at 14:30


Potassium Acetate 55 meq/Magnesium Sulfate 20 meq/ Calcium Gluconate 10 meq/ 

Multivitamins 10 ml/Chromium/ Copper/Manganese/ Seleni/Zn 0.5 ml/ Insulin Human 

Regular 35 unit/ Total Parenteral Nutrition/Amino Acids/Dextrose/ Fat Emulsion 

Intravenous 1,920 ml @  80 mls/hr TPN  CONT IV  Last administered on 4/27/20at 

22:01;  Start 4/27/20 at 22:00;  Stop 4/28/20 at 21:59;  Status DC


Bumetanide (Bumex) 2 mg [id number] IV  Last administered on 4/30/20at 13:01;  Start 

4/28/20 at 14:00


Meropenem 1 gm/ Sodium Chloride 100 ml @  200 mls/hr Q8HRS IV  Last administered

on 5/1/20at 05:11;  Start 4/28/20 at 14:00


Potassium Acetate 55 meq/Magnesium Sulfate 20 meq/ Calcium Gluconate 10 meq/ 

Multivitamins 10 ml/Chromium/ Copper/Manganese/ Seleni/Zn 0.5 ml/ Insulin Human 

Regular 35 unit/ Total Parenteral Nutrition/Amino Acids/Dextrose/ Fat Emulsion 

Intravenous 1,920 ml @  80 mls/hr TPN  CONT IV  Last administered on 4/28/20at 

22:02;  Start 4/28/20 at 22:00;  Stop 4/29/20 at 21:59;  Status DC


Hydromorphone HCl (Dilaudid Standard PCA) 12 mg STK-MED ONCE IV ;  Start 4/27/20

at 14:35;  Stop 4/28/20 at 13:53;  Status DC


Artificial Tears (Artificial Tears) 1 drop PRN Q15MIN  PRN OU DRY EYE Last 

administered on 4/30/20at 08:16;  Start 4/29/20 at 05:30


Hydromorphone HCl (Dilaudid Standard PCA) 12 mg STK-MED ONCE IV ;  Start 4/28/20

at 12:05;  Stop 4/29/20 at 09:15;  Status DC


Potassium Acetate 65 meq/Magnesium Sulfate 20 meq/ Calcium Gluconate 10 meq/ 

Multivitamins 10 ml/Chromium/ Copper/Manganese/ Seleni/Zn 0.5 ml/ Insulin Human 

Regular 30 unit/ Total Parenteral Nutrition/Amino Acids/Dextrose/ Fat Emulsion 

Intravenous 1,920 ml @  80 mls/hr TPN  CONT IV  Last administered on 4/29/20at 

22:22;  Start 4/29/20 at 22:00;  Stop 4/30/20 at 21:59;  Status DC


Cyclobenzaprine HCl (Flexeril) 10 mg PRN Q6HRS  PRN PO MUSCLE SPASMS;  Start 

4/30/20 at 10:45


Potassium Acetate 55 meq/Magnesium Sulfate 20 meq/ Calcium Gluconate 10 meq/ 

Multivitamins 10 ml/Chromium/ Copper/Manganese/ Seleni/Zn 0.5 ml/ Insulin Human 

Regular 30 unit/ Total Parenteral Nutrition/Amino Acids/Dextrose/ Fat Emulsion 

Intravenous 1,920 ml @  80 mls/hr TPN  CONT IV  Last administered on 5/1/20at 

01:00;  Start 4/30/20 at 22:00;  Stop 5/1/20 at 21:59


Magnesium Sulfate 50 ml @ 25 mls/hr 1X  ONCE IV  Last administered on 4/30/20at 

17:18;  Start 4/30/20 at 12:45;  Stop 4/30/20 at 14:44;  Status DC





Active Scripts


Active


Reported


Bisoprolol Fumarate 5 Mg Tablet 10 Mg PO DAILY


Vitals/I & O





Vital Sign - Last 24 Hours








 4/30/20 4/30/20 4/30/20 4/30/20





 09:00 10:00 11:00 11:17


 


Pulse 80 104 90 


 


Resp 17 22 20 


 


B/P (MAP) 91/59 (70) 100/64 (76) 138/71 (93) 


 


Pulse Ox 98 99 97 97


 


O2 Delivery Ventilator Ventilator Tracheal Collar Tracheal Collar


 


O2 Flow Rate    10.0





 4/30/20 4/30/20 4/30/20 4/30/20





 11:59 12:00 13:00 14:00


 


Temp  101.0  





  101.0  


 


Pulse  98 117 116


 


Resp  22 21 24


 


B/P (MAP)  139/64 (89) 139/64 (89) 169/89 (115)


 


Pulse Ox  98 97 99


 


O2 Delivery Mechanical Ventilator Tracheal Collar Tracheal Collar Tracheal 

Collar


 


    





    





 4/30/20 4/30/20 4/30/20 4/30/20





 15:00 16:00 16:00 17:00


 


Temp  99.5  





  99.5  


 


Pulse 125 130  114


 


Resp 25 22  29


 


B/P (MAP) 152/63 (92) 125/66 (85)  102/62 (75)


 


Pulse Ox 98 98  97


 


O2 Delivery Tracheal Collar Tracheal Collar Trach Collar Tracheal Collar


 


    





    





 4/30/20 4/30/20 4/30/20 4/30/20





 17:20 17:50 18:00 19:00


 


Temp   98.8 98.8





   98.8 98.8


 


Pulse   108 110


 


Resp 27 24 27 


 


B/P (MAP)   129/70 (89) 158/77 (104)


 


Pulse Ox 97 97 97 98


 


O2 Delivery Tracheal Collar Tracheal Collar Tracheal Collar Tracheal Collar


 


O2 Flow Rate    9.0


 


    





    





 4/30/20 4/30/20 4/30/20 4/30/20





 20:00 20:00 21:00 22:00


 


Temp  99.0 99.0 99.4





  99.0 99.0 99.4


 


Pulse  114 108 122


 


Resp  34 35 36


 


B/P (MAP)  150/82 (104) 154/76 (102) 171/88 (115)


 


Pulse Ox  96 97 98


 


O2 Delivery Trach Collar  Tracheal Collar Tracheal Collar


 


O2 Flow Rate 10.0  9.0 9.0


 


    





    





 4/30/20 5/1/20 5/1/20 5/1/20





 23:00 00:00 00:00 01:00


 


Temp 99.5  99.5 99.6





 99.5  99.5 99.6


 


Pulse 104  134 106


 


Resp 30  30 33


 


B/P (MAP) 134/67 (89)  130/66 (87) 138/63 (88)


 


Pulse Ox 96  96 98


 


O2 Delivery Tracheal Collar Trach Collar Tracheal Collar Tracheal Collar


 


O2 Flow Rate 9.0 9.0 9.0 9.0


 


    





    





 5/1/20 5/1/20 5/1/20 5/1/20





 02:00 03:00 04:00 04:00


 


Temp 99.6 99.0  99.6





 99.6 99.0  99.6


 


Pulse 106 124  118


 


Resp 31   40


 


B/P (MAP) 181/83 (115) 142/71 (94)  176/79 (111)


 


Pulse Ox 98 96  96


 


O2 Delivery Tracheal Collar Tracheal Collar Trach Collar Tracheal Collar


 


O2 Flow Rate 9.0 9.0 9.0 9.0


 


    





    





 5/1/20 5/1/20 5/1/20 





 05:00 06:00 07:05 


 


Temp  98.3  





  98.3  


 


Pulse 128 130  


 


Resp 38 40  


 


B/P (MAP) 156/82 (106) 154/79 (104)  


 


Pulse Ox 96 96 98 


 


O2 Delivery Tracheal Collar Tracheal Collar Tracheal Collar 


 


O2 Flow Rate 9.0 9.0 10.0 














Intake and Output   


 


 4/30/20 4/30/20 5/1/20





 15:00 23:00 07:00


 


Intake Total 150 ml 1262 ml 1114 ml


 


Output Total 2840 ml 945 ml 3000 ml


 


Balance -2690 ml 317 ml -1886 ml











Hemodynamically unstable?:  No


Is patient in severe pain?:  No


Is NPO status required?:  Yes











GAETANO GONCALVES MD         May 1, 2020 09:03

## 2020-05-01 NOTE — NUR
Patient on Trach Shield 35% since yesterday. SpO2 >95%. Tolerating well. Anxiety controlled. 


Worked with PT/OT- currently sitting in chair. Will transfer back to bed before end of 
shift. 

Patient's mother came to visit for ~1 hour. 



ST assessed patient. See note. 

Tolerating trach shield with minimal anxiety when awake. 



See assessments, VS.

## 2020-05-01 NOTE — PDOC
SURGICAL PROGRESS NOTE


Subjective


Pt reportedly didn't sleep well last night and currently asleep, appears 

comfortable


Vital Signs





Vital Signs








  Date Time  Temp Pulse Resp B/P (MAP) Pulse Ox O2 Delivery O2 Flow Rate FiO2


 


5/1/20 07:05     98 Tracheal Collar 10.0 


 


5/1/20 06:00 98.3 130 40 154/79 (104)    





 98.3       








I&O











Intake and Output 


 


 5/1/20





 07:00


 


Intake Total 2526 ml


 


Output Total 6785 ml


 


Balance -4259 ml


 


 


 


Intake Oral 0 ml


 


IV Total 2526 ml


 


Output Urine Total 6740 ml


 


Drainage Total 45 ml








PATIENT HAS A VILLASENOR:  Yes (accurate i and os)


General:  No acute distress


Labs





Laboratory Tests








Test


 4/29/20


12:25 4/29/20


17:07 4/30/20


00:34 4/30/20


08:20


 


Glucose (Fingerstick)


 125 mg/dL


(70-99) 133 mg/dL


(70-99) 112 mg/dL


(70-99) 





 


White Blood Count


 


 


 


 11.3 x10^3/uL


(4.0-11.0)


 


Red Blood Count


 


 


 


 2.66 x10^6/uL


(3.50-5.40)


 


Hemoglobin


 


 


 


 7.7 g/dL


(12.0-15.5)


 


Hematocrit


 


 


 


 24.5 %


(36.0-47.0)


 


Mean Corpuscular Volume    92 fL () 


 


Mean Corpuscular Hemoglobin    29 pg (25-35) 


 


Mean Corpuscular Hemoglobin


Concent 


 


 


 32 g/dL


(31-37)


 


Red Cell Distribution Width


 


 


 


 18.6 %


(11.5-14.5)


 


Platelet Count


 


 


 


 411 x10^3/uL


(140-400)


 


Sodium Level


 


 


 


 152 mmol/L


(136-145)


 


Potassium Level


 


 


 


 3.6 mmol/L


(3.5-5.1)


 


Chloride Level


 


 


 


 113 mmol/L


()


 


Carbon Dioxide Level


 


 


 


 33 mmol/L


(21-32)


 


Anion Gap    6 (6-14) 


 


Blood Urea Nitrogen


 


 


 


 51 mg/dL


(7-20)


 


Creatinine


 


 


 


 1.1 mg/dL


(0.6-1.0)


 


Estimated GFR


(Cockcroft-Gault) 


 


 


 52.8 





 


Glucose Level


 


 


 


 119 mg/dL


(70-99)


 


Calcium Level


 


 


 


 8.5 mg/dL


(8.5-10.1)


 


Magnesium Level


 


 


 


 1.7 mg/dL


(1.8-2.4)


 


Test


 4/30/20


12:10 4/30/20


17:26 5/1/20


00:59 5/1/20


06:27


 


Glucose (Fingerstick)


 169 mg/dL


(70-99) 152 mg/dL


(70-99) 152 mg/dL


(70-99) 139 mg/dL


(70-99)


 


Test


 5/1/20


06:30 


 


 





 


White Blood Count


 14.7 x10^3/uL


(4.0-11.0) 


 


 





 


Red Blood Count


 2.78 x10^6/uL


(3.50-5.40) 


 


 





 


Hemoglobin


 8.1 g/dL


(12.0-15.5) 


 


 





 


Hematocrit


 25.3 %


(36.0-47.0) 


 


 





 


Mean Corpuscular Volume 91 fL ()    


 


Mean Corpuscular Hemoglobin 29 pg (25-35)    


 


Mean Corpuscular Hemoglobin


Concent 32 g/dL


(31-37) 


 


 





 


Red Cell Distribution Width


 18.7 %


(11.5-14.5) 


 


 





 


Platelet Count


 442 x10^3/uL


(140-400) 


 


 





 


Neutrophils (%) (Auto) 80 % (31-73)    


 


Lymphocytes (%) (Auto) 15 % (24-48)    


 


Monocytes (%) (Auto) 4 % (0-9)    


 


Eosinophils (%) (Auto) 1 % (0-3)    


 


Basophils (%) (Auto) 0 % (0-3)    


 


Neutrophils # (Auto)


 11.7 x10^3/uL


(1.8-7.7) 


 


 





 


Lymphocytes # (Auto)


 2.2 x10^3/uL


(1.0-4.8) 


 


 





 


Monocytes # (Auto)


 0.5 x10^3/uL


(0.0-1.1) 


 


 





 


Eosinophils # (Auto)


 0.1 x10^3/uL


(0.0-0.7) 


 


 





 


Basophils # (Auto)


 0.0 x10^3/uL


(0.0-0.2) 


 


 





 


Sodium Level


 153 mmol/L


(136-145) 


 


 





 


Potassium Level


 3.3 mmol/L


(3.5-5.1) 


 


 





 


Chloride Level


 111 mmol/L


() 


 


 





 


Carbon Dioxide Level


 33 mmol/L


(21-32) 


 


 





 


Anion Gap 9 (6-14)    


 


Blood Urea Nitrogen


 47 mg/dL


(7-20) 


 


 





 


Creatinine


 0.9 mg/dL


(0.6-1.0) 


 


 





 


Estimated GFR


(Cockcroft-Gault) 66.5 


 


 


 





 


Glucose Level


 150 mg/dL


(70-99) 


 


 





 


Calcium Level


 8.7 mg/dL


(8.5-10.1) 


 


 





 


Phosphorus Level


 4.6 mg/dL


(2.6-4.7) 


 


 





 


Magnesium Level


 1.8 mg/dL


(1.8-2.4) 


 


 











Laboratory Tests








Test


 4/30/20


12:10 4/30/20


17:26 5/1/20


00:59 5/1/20


06:27


 


Glucose (Fingerstick)


 169 mg/dL


(70-99) 152 mg/dL


(70-99) 152 mg/dL


(70-99) 139 mg/dL


(70-99)


 


Test


 5/1/20


06:30 


 


 





 


White Blood Count


 14.7 x10^3/uL


(4.0-11.0) 


 


 





 


Red Blood Count


 2.78 x10^6/uL


(3.50-5.40) 


 


 





 


Hemoglobin


 8.1 g/dL


(12.0-15.5) 


 


 





 


Hematocrit


 25.3 %


(36.0-47.0) 


 


 





 


Mean Corpuscular Volume 91 fL ()    


 


Mean Corpuscular Hemoglobin 29 pg (25-35)    


 


Mean Corpuscular Hemoglobin


Concent 32 g/dL


(31-37) 


 


 





 


Red Cell Distribution Width


 18.7 %


(11.5-14.5) 


 


 





 


Platelet Count


 442 x10^3/uL


(140-400) 


 


 





 


Neutrophils (%) (Auto) 80 % (31-73)    


 


Lymphocytes (%) (Auto) 15 % (24-48)    


 


Monocytes (%) (Auto) 4 % (0-9)    


 


Eosinophils (%) (Auto) 1 % (0-3)    


 


Basophils (%) (Auto) 0 % (0-3)    


 


Neutrophils # (Auto)


 11.7 x10^3/uL


(1.8-7.7) 


 


 





 


Lymphocytes # (Auto)


 2.2 x10^3/uL


(1.0-4.8) 


 


 





 


Monocytes # (Auto)


 0.5 x10^3/uL


(0.0-1.1) 


 


 





 


Eosinophils # (Auto)


 0.1 x10^3/uL


(0.0-0.7) 


 


 





 


Basophils # (Auto)


 0.0 x10^3/uL


(0.0-0.2) 


 


 





 


Sodium Level


 153 mmol/L


(136-145) 


 


 





 


Potassium Level


 3.3 mmol/L


(3.5-5.1) 


 


 





 


Chloride Level


 111 mmol/L


() 


 


 





 


Carbon Dioxide Level


 33 mmol/L


(21-32) 


 


 





 


Anion Gap 9 (6-14)    


 


Blood Urea Nitrogen


 47 mg/dL


(7-20) 


 


 





 


Creatinine


 0.9 mg/dL


(0.6-1.0) 


 


 





 


Estimated GFR


(Cockcroft-Gault) 66.5 


 


 


 





 


Glucose Level


 150 mg/dL


(70-99) 


 


 





 


Calcium Level


 8.7 mg/dL


(8.5-10.1) 


 


 





 


Phosphorus Level


 4.6 mg/dL


(2.6-4.7) 


 


 





 


Magnesium Level


 1.8 mg/dL


(1.8-2.4) 


 


 











Problem List


Problems


Medical Problems:


(1) Acute pancreatitis


Status: Acute  





(2) Cholelithiasis


Status: Acute  








Assessment/Plan


s/p lap exp


cont supportive care


once awake and interactive, will consider d/c NGT and trial of PO per speech.











GAMAL ZHOU MD              May 1, 2020 08:47

## 2020-05-01 NOTE — NUR
Pharmacy TPN Dosing Note



S: JESENIA RICH is a 49 year old F Currently receiving Central Continuous TPN started 
03/18/20



B:Pertinent PMH: Necrotizing pancreatitis

Height: 5 feet, 8 inches

Weight: 105.0 kg



Current diet: NPO 



LABS:

Sodium:    153 

Potassium: 3.3 

Chloride:  111 

Calcium:   8.7 

Corrected Calcium: 9.74 

Magnesium: 1.8 

CO2:       33 

SCr:       0.9 

Glucose:   139-152 

Albumin:   2.7 

AST:       21 

ALT:       11 



TPN FORMULA:

TPN TYPE:  Central Continuous

AMINO ACIDS:         90 gm

DEXTROSE:            225 gm

LIPIDS:              30 gm

POTASSIUM CHLORIDE:  30 mEq

POTASSIUM ACETATE:   30 mEq

MAGNESIUM:           20 mEq

CALCIUM:             10 mEq

INSULIN:             30 units

MULTIPLE VITAMIN:    10 ml

TRACE ELEMENTS:      0.5 ml(s)



TPN PLAN:  

TCO2 is elevated. Will change Potassium in TPN to 30 meq KCl and 30 meq

KAcetate. Replaced K of 3.3 with 20 meq KCl IVPB x 1 dose today. Adjusted

macros per RD recommendations. Still receiving bumex IVP BID.

-BMP in AM.





R: Change TPN as noted above.

Will monitor electrolytes, glucose, and tolerance to TPN.



 LORENZO LESLIE [facility], 05/01/20 3521

## 2020-05-01 NOTE — NUR
Pt very anxious, gasping for air. Pt suctioned several times and moderated amount of thick 
white secretions removed. O2 sats in the 80's and pt pointed at vent. Pt placed back on vent 
with CPAP settings, 15/5 30%. O2 sats increased to 98% quickly.

## 2020-05-01 NOTE — PDOC
Objective:


Objective:


D/w nurse - doing much better though had some anxiety this morning.


Vital Signs:





                                   Vital Signs








  Date Time  Temp Pulse Resp B/P (MAP) Pulse Ox O2 Delivery O2 Flow Rate FiO2


 


5/1/20 09:00  92 29 91/48 (62) 96 Tracheal Collar  


 


5/1/20 08:00 98.6       





 98.6       


 


5/1/20 07:05       10.0 








Labs:





Laboratory Tests








Test


 4/30/20


12:10 4/30/20


17:26 5/1/20


00:59 5/1/20


06:27


 


Glucose (Fingerstick) 169 mg/dL  152 mg/dL  152 mg/dL  139 mg/dL 


 


Test


 5/1/20


06:30 


 


 





 


White Blood Count 14.7 x10^3/uL    


 


Red Blood Count 2.78 x10^6/uL    


 


Hemoglobin 8.1 g/dL    


 


Hematocrit 25.3 %    


 


Mean Corpuscular Volume 91 fL    


 


Mean Corpuscular Hemoglobin 29 pg    


 


Mean Corpuscular Hemoglobin


Concent 32 g/dL 


 


 


 





 


Red Cell Distribution Width 18.7 %    


 


Platelet Count 442 x10^3/uL    


 


Neutrophils (%) (Auto) 80 %    


 


Lymphocytes (%) (Auto) 15 %    


 


Monocytes (%) (Auto) 4 %    


 


Eosinophils (%) (Auto) 1 %    


 


Basophils (%) (Auto) 0 %    


 


Neutrophils # (Auto) 11.7 x10^3/uL    


 


Lymphocytes # (Auto) 2.2 x10^3/uL    


 


Monocytes # (Auto) 0.5 x10^3/uL    


 


Eosinophils # (Auto) 0.1 x10^3/uL    


 


Basophils # (Auto) 0.0 x10^3/uL    


 


Sodium Level 153 mmol/L    


 


Potassium Level 3.3 mmol/L    


 


Chloride Level 111 mmol/L    


 


Carbon Dioxide Level 33 mmol/L    


 


Anion Gap 9    


 


Blood Urea Nitrogen 47 mg/dL    


 


Creatinine 0.9 mg/dL    


 


Estimated GFR


(Cockcroft-Gault) 66.5 


 


 


 





 


Glucose Level 150 mg/dL    


 


Calcium Level 8.7 mg/dL    


 


Phosphorus Level 4.6 mg/dL    


 


Magnesium Level 1.8 mg/dL    








ORDERED:  MARJAN/DON/BENJAMÍN                                                        

 


COMMENTS: ASCITES                                                     


---------------------------------------------------------------------------

-----------------





  Procedure                         Result                                      

         


------------------

--------------------------------------------------------------------------





  ANAEROBIC-AEROBIC CULTURE  


                                PENDING





  ANAEROBIC RES 1  


                                PENDING





  AEROBIC CULT  


                                PENDING





  AEROBIC RES 1  


                                PENDING





  GRAM STAIN  Final  


        Final report





  GRAM STAIN RES 1  Final  


        Comment





        No white blood cells seen.





  GRAM STAIN RES 2  Final  


        No organisms seen








  BLOOD CULTURE  Preliminary  


        NO GROWTH AFTER 1 DAY


Imaging:


Speech Path


Pt now tolerating trach shield for longer periods of time (ie, hours) and NG is 

being clamped. CARMENZA Fernando that swallow function would be best assessed when pt 

can tolerate wearing PM Valve. RN called Dr Castano and d/w him w/this SLP present.

RN stated Dr Castano was in agreement w/pt tolerating PM Valve use prior to swallow

assessment.


PLAN: 1. Obtain PM Valve from RT and initiate usage. 2. Swallow eval when pt ab

le to santosh wearing valve and when cleared by GI and surgery for po intake.





PE:





GEN: NAD - sleeping, did not awaken





A/P:


Necrotizing pancreatitis





--


Improving.


?try PO soon





Hemodynamically unstable?:  No


Is patient in severe pain?:  No


Is NPO status required?:  No











RAGHAVENDRA MURCIA          May 1, 2020 10:30

## 2020-05-01 NOTE — NUR
SS following up with discharge planing. SS discussed with RN, Kassie. Pt is on TPN, IV 
antibiotic, and trach shield. Pt is two person assist and unable to stand at this time. 
[facility], [facility], [id number], was able to contact pt's daughters and spoke with Glenna. Appointment to 
complete disability packet scheduled with [facility] on Thursday, 5/7/2020, at 1500. Glenna 
reported that she has a list of documents that she needs to bring and will be present for 
the meeting. Physician statement completed and signed by pt's daughter. Court documents for 
guardianship in chart. SS will continue to follow for discharge planning.

## 2020-05-01 NOTE — PDOC
Infectious Disease Note


Subjective:


Subjective


 Pt feels better


Had  temp of 101 yesterday, came down without any antipyretic


on trach with vent


On TPN





Vital Signs:


Vital Signs





Vital Signs








  Date Time  Temp Pulse Resp B/P (MAP) Pulse Ox O2 Delivery O2 Flow Rate FiO2


 


5/1/20 07:05     98 Tracheal Collar 10.0 


 


5/1/20 06:00 98.3 130 40 154/79 (104)    





 98.3       











Physical Exam:


PHYSICAL EXAM


GENERAL: Propped up in bed, sedated weak appearing 


HEENT: Pupils equal, + NGT, oral cavity dry 


NECK:  Trach/vent 


LUNGS: rhonchi 


HEART:  S1, S2, regular 


ABDOMEN: Distended, hypoactive BS, drain placement (4/27 )


: [place] (4/14)


EXTREMITIES: Generalized edema, no cyanosis, SCDs bilaterally 


DERMATOLOGIC:  Warm and dry.  No generalized rash.  


CENTRAL NERVOUS SYSTEM: Extremely weak, nods to few simple questions 


PICC line in place April 30, 2020 clean


HDC has been removed


LIJ removed





Medications:


Inpatient Meds:





Current Medications








 Medications


  (Trade)  Dose


 Ordered  Sig/Yee  Start Time


 Stop Time Status Last Admin


Dose Admin


 


 Acetaminophen


  (Tylenol Supp)  650 mg  PRN Q6HRS  PRN  3/24/20 10:30


    4/27/20 00:32


650 MG


 


 Acetaminophen


  (Tylenol)  650 mg  PRN Q6HRS  PRN  3/21/20 03:36


    4/16/20 19:56


650 MG


 


 Albumin Human  200 ml @ 


 200 mls/hr  1X PRN  PRN  4/21/20 08:00


 4/21/20 13:59 DC 4/21/20 08:40


200 MLS/HR


 


 Albuterol Sulfate


  (Ventolin Neb


 Soln)  2.5 mg  1X  ONCE  3/17/20 22:30


 3/17/20 22:31 DC 3/18/20 00:56


2.5 MG


 


 Alteplase,


 Recombinant


  (Cathflo For


 Central Catheter


 Clearance)  1 mg  1X  ONCE  4/24/20 10:45


 4/24/20 10:46 DC 4/24/20 11:44


1 MG


 


 Amino Acids/


 Glycerin/


 Electrolytes  1,000 ml @ 


 75 mls/hr  [id number]  4/20/20 21:15


   UNV  





 


 Artificial Tears


  (Artificial


 Tears)  1 drop  PRN Q15MIN  PRN  4/29/20 05:30


    4/30/20 08:16


1 DROP


 


 Atenolol


  (Tenormin)  100 mg  DAILY  3/17/20 09:00


 3/16/20 20:08 DC  





 


 Atropine Sulfate


  (ATROPINE 0.5mg


 SYRINGE)  0.5 mg  PRN Q5MIN  PRN  4/2/20 08:15


     





 


 Benzocaine


  (Hurricaine One)  1 spray  1X  ONCE  3/20/20 14:30


 3/20/20 14:31 DC 3/20/20 16:38


1 SPRAY


 


 Bisacodyl


  (Dulcolax Supp)  10 mg  STK-MED ONCE  4/27/20 10:59


 4/27/20 10:59 DC  





 


 Bumetanide


  (Bumex)  2 mg  [id number]  4/28/20 14:00


    4/30/20 13:01


2 MG


 


 Bupivacaine HCl/


 Epinephrine Bitart


  (Sensorcain-Epi


 0.5%-1:158908 Mpf)  30 ml  STK-MED ONCE  4/27/20 10:58


 4/27/20 10:58 DC 4/27/20 12:01


7 ML


 


 Calcium Carbonate/


 Glycine


  (Tums)  500 mg  PRN [id number]  PRN  3/18/20 17:45


     





 


 Calcium Chloride


 1000 mg/Sodium


 Chloride  110 ml @ 


 220 mls/hr  1X  ONCE  3/17/20 22:30


 3/17/20 22:59 DC 3/17/20 22:11


220 MLS/HR


 


 Calcium Chloride


 3000 mg/Sodium


 Chloride  1,030 ml @ 


 50 mls/hr  [id number]  3/19/20 08:00


 3/21/20 15:23 DC 3/21/20 02:17


50 MLS/HR


 


 Calcium Gluconate


  (Calcium


 Gluconate)  2,000 mg  1X  ONCE  3/19/20 02:15


 3/19/20 02:16 DC 3/19/20 02:19


2,000 MG


 


 Calcium Gluconate


 1000 mg/Sodium


 Chloride  110 ml @ 


 220 mls/hr  1X  ONCE  3/18/20 03:30


 3/18/20 03:59 DC 3/18/20 03:21


220 MLS/HR


 


 Calcium Gluconate


 2000 mg/Sodium


 Chloride  120 ml @ 


 220 mls/hr  1X  ONCE  3/18/20 07:30


 3/18/20 08:02 DC 3/18/20 09:05


220 MLS/HR


 


 Cefepime HCl


  (Maxipime)  2 gm  Q12HR  3/25/20 09:00


 4/8/20 09:58 DC 4/7/20 20:56


2 GM


 


 Cellulose


  (Surgicel


 Fibrillar 1x2)  1 each  STK-MED ONCE  4/6/20 11:00


 4/6/20 11:01 DC  





 


 Cellulose


  (Surgicel


 Hemostat 2x14)  1 each  STK-MED ONCE  4/27/20 10:58


 4/27/20 10:59 DC  





 


 Cellulose


  (Surgicel


 Hemostat 4x8)  1 each  STK-MED ONCE  4/27/20 10:58


 4/27/20 10:59 DC  





 


 Cyclobenzaprine


 HCl


  (Flexeril)  10 mg  PRN Q6HRS  PRN  4/30/20 10:45


     





 


 Daptomycin 430 mg/


 Sodium Chloride  50 ml @ 


 100 mls/hr  Q24H  4/25/20 13:00


 4/30/20 20:58 DC 4/30/20 13:00


100 MLS/HR


 


 Daptomycin 500 mg/


 Sodium Chloride  50 ml @ 


 100 mls/hr  Q48H  3/25/20 08:30


 4/10/20 10:07 DC 4/10/20 09:57


100 MLS/HR


 


 Dexamethasone


 Sodium Phosphate


  (Decadron)  4 mg  STK-MED ONCE  4/27/20 10:56


 4/27/20 10:57 DC  





 


 Dexmedetomidine


 HCl 400 mcg/


 Sodium Chloride  100 ml @ 0


 mls/hr  CONT  PRN  4/2/20 08:15


    5/1/20 05:10


22.2 MLS/HR


 


 Dextrose


  (Dextrose


 50%-Water Syringe)  12.5 gm  PRN Q15MIN  PRN  3/16/20 09:30


     





 


 Digoxin


  (Lanoxin)  125 mcg  1X  ONCE  3/19/20 18:00


 3/19/20 18:01 DC 3/19/20 17:10


125 MCG


 


 Diphenhydramine


 HCl


  (Benadryl)  25 mg  1X PRN  PRN  4/24/20 15:45


 4/25/20 15:44 DC  





 


 Enoxaparin Sodium


  (Lovenox 100mg


 Syringe)  100 mg  Q12HR  4/21/20 21:00


   UNV  





 


 Etomidate


  (Amidate)  8 mg  1X  ONCE  3/23/20 08:30


 3/23/20 08:31 DC 3/23/20 08:33


8 MG


 


 Fentanyl Citrate


  (Fentanyl 2ml


 Vial)  100 mcg  STK-MED ONCE  4/27/20 10:56


 4/27/20 10:57 DC  





 


 Furosemide


  (Lasix)  40 mg  1X  ONCE  4/13/20 14:30


 4/13/20 14:31 DC 4/13/20 14:39


40 MG


 


 Heparin Sodium


  (Porcine)


  (Hep Lock Adult)  500 unit  STK-MED ONCE  4/7/20 09:29


 4/7/20 09:30 DC  





 


 Heparin Sodium


  (Porcine)


  (Heparin Sodium)  5,000 unit  Q12HR  4/27/20 21:00


    4/30/20 21:22


5,000 UNIT


 


 Heparin Sodium


  (Porcine) 1000


 unit/Sodium


 Chloride  1,001 ml @ 


 1,001 mls/hr  1X  ONCE  4/27/20 12:00


 4/27/20 12:59 DC  





 


 Hydromorphone HCl


  (Dilaudid


 Standard PCA)  12 mg  STK-MED ONCE  4/28/20 12:05


 4/29/20 09:15 DC  





 


 Hydromorphone HCl


  (Dilaudid)  1 mg  PRN Q3HRS  PRN  3/17/20 12:00


 3/31/20 00:25 DC 3/23/20 05:13


1 MG


 


 Info


  (CONTRAST GIVEN


 -- Rx MONITORING)  1 each  PRN DAILY  PRN  3/30/20 11:45


 4/1/20 11:44 DC  





 


 Info


  (Icu Electrolyte


 Protocol)  1 ea  CONT PRN  PRN  3/29/20 13:15


     





 


 Info


  (PHARMACY


 MONITORING -- do


 not chart)  1 each  PRN DAILY  PRN  4/24/20 15:45


     





 


 Info


  (Tpn Per


 Pharmacy)  1 each  PRN DAILY  PRN  3/18/20 12:30


   UNV  





 


 Insulin Human


 Lispro


  (HumaLOG)  0-9 UNITS  Q6HRS  3/16/20 09:30


    4/30/20 17:29


4 UNITS


 


 Insulin Human


 Regular


  (HumuLIN R VIAL)  5 unit  1X  ONCE  3/17/20 22:30


 3/17/20 22:31 DC 3/17/20 22:14


5 UNIT


 


 Iohexol


  (Omnipaque 240


 Mg/ml)  30 ml  1X  ONCE  3/30/20 11:30


 3/30/20 11:33 DC 3/30/20 11:30


30 ML


 


 Iohexol


  (Omnipaque 300


 Mg/ml)  50 ml  STK-MED ONCE  4/27/20 10:58


 4/27/20 10:59 DC  





 


 Iohexol


  (Omnipaque 350


 Mg/ml)  90 ml  1X  ONCE  3/16/20 03:30


 3/16/20 03:31 DC 3/16/20 03:25


90 ML


 


 Ketorolac


 Tromethamine


  (Toradol 30mg


 Vial)  30 mg  1X  ONCE  3/16/20 03:00


 3/16/20 03:01 DC 3/16/20 02:54


30 MG


 


 Lidocaine HCl


  (Buffered


 Lidocaine 1%)  6 ml  1X  ONCE  4/15/20 13:30


 4/15/20 13:31 DC 4/15/20 13:45


9 ML


 


 Lidocaine HCl


  (Glydo


  (Lidocaine) Jelly)  1 thomas  1X  ONCE  3/20/20 14:30


 3/20/20 14:31 DC 3/20/20 16:38


1 THOMAS


 


 Lidocaine HCl


  (Xylocaine-Mpf


 1% 2ml Vial)  2 ml  PRN 1X  PRN  4/27/20 07:00


 4/28/20 06:59 DC  





 


 Linezolid/Dextrose  300 ml @ 


 300 mls/hr  Q12HR  4/10/20 11:00


 4/21/20 08:10 DC 4/20/20 20:40


300 MLS/HR


 


 Lorazepam


  (Ativan Inj)  1 mg  PRN Q4HRS  PRN  3/19/20 09:00


 4/17/20 09:19 DC 4/17/20 03:51


1 MG


 


 Magnesium Sulfate  50 ml @ 25


 mls/hr  1X  ONCE  4/30/20 12:45


 4/30/20 14:44 DC 4/30/20 17:18


25 MLS/HR


 


 Meropenem 1 gm/


 Sodium Chloride  100 ml @ 


 200 mls/hr  Q8HRS  4/28/20 14:00


    5/1/20 05:11


200 MLS/HR


 


 Meropenem 500 mg/


 Sodium Chloride  50 ml @ 


 100 mls/hr  Q12H  4/8/20 10:00


 4/28/20 12:37 DC 4/28/20 10:45


100 MLS/HR


 


 Metoprolol


 Tartrate


  (Lopressor Vial)  5 mg  Q6HRS  3/17/20 10:15


 3/28/20 08:48 DC 3/26/20 00:12


5 MG


 


 Metronidazole  100 ml @ 


 100 mls/hr  Q8HRS  4/14/20 10:00


 4/21/20 08:10 DC 4/21/20 06:04


100 MLS/HR


 


 Micafungin Sodium


 100 mg/Dextrose  100 ml @ 


 100 mls/hr  Q24H  3/23/20 09:00


 4/30/20 20:58 DC 4/30/20 08:18


100 MLS/HR


 


 Midazolam HCl


  (Versed)  5 mg  1X  ONCE  3/23/20 08:30


 3/23/20 08:31 DC  





 


 Midazolam HCl 100


 mg/Sodium Chloride  100 ml @ 7


 mls/hr  CONT  PRN  3/28/20 16:00


    4/8/20 15:35


7 MLS/HR


 


 Midazolam HCl 50


 mg/Sodium Chloride  50 ml @ 0


 mls/hr  CONT  PRN  3/23/20 08:15


 3/28/20 15:59 DC 3/26/20 22:39


7 MLS/HR


 


 Morphine Sulfate


  (Morphine


 Sulfate)  2 mg  PRN Q2HR  PRN  3/16/20 05:00


 3/17/20 14:15 DC 3/17/20 12:26


2 MG


 


 Multi-Ingred


 Cream/Lotion/Oil/


 Oint


  (Artificial


 Tears Eye


 Ointment)  1 thomas  PRN Q1HR  PRN  3/25/20 17:30


    4/13/20 08:19


1 THOMAS


 


 Naloxone HCl


  (Narcan)  0.4 mg  PRN Q2MIN  PRN  4/27/20 14:30


   UNV  





 


 Norepinephrine


 Bitartrate 8 mg/


 Dextrose  258 ml @ 


 17.299 mls/


 hr  CONT  PRN  3/17/20 15:30


 4/17/20 09:19 DC 4/14/20 12:48


20.9 MLS/HR


 


 Ondansetron HCl


  (Zofran)  4 mg  STK-MED ONCE  4/27/20 10:56


 4/27/20 10:57 DC  





 


 Pantoprazole


 Sodium


  (PROTONIX VIAL


 for IV PUSH)  40 mg  DAILYAC  3/16/20 11:30


    4/30/20 08:17


40 MG


 


 Phenylephrine HCl


  (PHENYLEPHRINE


 in 0.9% NACL PF)  1 mg  STK-MED ONCE  4/27/20 12:34


 4/27/20 12:34 DC  





 


 Piperacillin Sod/


 Tazobactam Sod


 4.5 gm/Sodium


 Chloride  100 ml @ 


 200 mls/hr  1X  ONCE  3/16/20 06:00


 3/16/20 06:29 DC 3/16/20 05:44


200 MLS/HR


 


 Potassium


 Chloride 15 meq/


 Bicarbonate


 Dialysis Soln w/


 out KCl  5,007.5 ml


  @ 1,000 mls/


 hr  Q5H1M  3/29/20 20:00


 4/2/20 13:08 DC 4/1/20 18:14


1,000 MLS/HR


 


 Potassium


 Chloride 20 meq/


 Bicarbonate


 Dialysis Soln w/


 out KCl  5,010 ml @ 


 1,000 mls/hr  Q5H1M  3/25/20 16:00


 3/29/20 19:59 DC 3/29/20 14:54


1,000 MLS/HR


 


 Potassium


 Chloride/Water  100 ml @ 


 100 mls/hr  1X  ONCE  4/18/20 14:45


 4/18/20 15:44 DC 4/18/20 17:28


100 MLS/HR


 


 Potassium


 Phosphate 20 mmol/


 Sodium Chloride  106.6667


 ml @ 


 51.667 m...  1X  ONCE  3/25/20 13:00


 3/25/20 15:03 DC 3/25/20 12:51


51.667 MLS/HR


 


 Potassium Acetate


 55 meq/Magnesium


 Sulfate 20 meq/


 Calcium Gluconate


 10 meq/


 Multivitamins 10


 ml/Chromium/


 Copper/Manganese/


 Seleni/Zn 0.5 ml/


 Insulin Human


 Regular 30 unit/


 Total Parenteral


 Nutrition/Amino


 Acids/Dextrose/


 Fat Emulsion


 Intravenous  1,920 ml @ 


 80 mls/hr  TPN  CONT  4/30/20 22:00


 5/1/20 21:59  5/1/20 01:00


80 MLS/HR


 


 Potassium Acetate


 55 meq/Magnesium


 Sulfate 20 meq/


 Calcium Gluconate


 10 meq/


 Multivitamins 10


 ml/Chromium/


 Copper/Manganese/


 Seleni/Zn 0.5 ml/


 Insulin Human


 Regular 35 unit/


 Total Parenteral


 Nutrition/Amino


 Acids/Dextrose/


 Fat Emulsion


 Intravenous  1,920 ml @ 


 80 mls/hr  TPN  CONT  4/28/20 22:00


 4/29/20 21:59 DC 4/28/20 22:02


80 MLS/HR


 


 Potassium Acetate


 65 meq/Magnesium


 Sulfate 20 meq/


 Calcium Gluconate


 10 meq/


 Multivitamins 10


 ml/Chromium/


 Copper/Manganese/


 Seleni/Zn 0.5 ml/


 Insulin Human


 Regular 30 unit/


 Total Parenteral


 Nutrition/Amino


 Acids/Dextrose/


 Fat Emulsion


 Intravenous  1,920 ml @ 


 80 mls/hr  TPN  CONT  4/29/20 22:00


 4/30/20 21:59 DC 4/29/20 22:22


80 MLS/HR


 


 Prochlorperazine


 Edisylate


  (Compazine)  5 mg  PACU PRN  PRN  4/27/20 07:00


 4/28/20 06:59 DC  





 


 Propofol  20 ml @ As


 Directed  STK-MED ONCE  4/27/20 12:26


 4/27/20 12:27 DC  





 


 Ringer's Solution  1,000 ml @ 


 30 mls/hr  Q24H  4/27/20 07:00


 4/27/20 18:59 DC  





 


 Rocuronium Bromide


  (Zemuron)  50 mg  STK-MED ONCE  4/27/20 10:56


 4/27/20 10:57 DC  





 


 Sevoflurane


  (Ultane)  60 ml  STK-MED ONCE  4/27/20 12:26


 4/27/20 12:27 DC  





 


 Sodium


 Bicarbonate 50


 meq/Sodium


 Chloride  1,050 ml @ 


 75 mls/hr  Q14H  3/18/20 07:30


 3/23/20 10:28 DC 3/22/20 21:10


75 MLS/HR


 


 Sodium Acetate 50


 meq/Potassium


 Acetate 55 meq/


 Magnesium Sulfate


 20 meq/Calcium


 Gluconate 10 meq/


 Multivitamins 10


 ml/Chromium/


 Copper/Manganese/


 Seleni/Zn 0.5 ml/


 Insulin Human


 Regular 35 unit/


 Total Parenteral


 Nutrition/Amino


 Acids/Dextrose/


 Fat Emulsion


 Intravenous  1,800 ml @ 


 75 mls/hr  TPN  CONT  4/25/20 22:00


 4/26/20 21:59 DC 4/25/20 22:03


75 MLS/HR


 


 Sodium Chloride  1,000 ml @ 


 25 mls/hr  Q24H  4/27/20 14:30


   UNV  





 


 Sodium Chloride


  (Normal Saline


 Flush)  3 ml  QSHIFT  PRN  4/27/20 13:45


     





 


 Sodium Chloride


 90 meq/Calcium


 Gluconate 10 meq/


 Multivitamins 10


 ml/Chromium/


 Copper/Manganese/


 Seleni/Zn 0.5 ml/


 Total Parenteral


 Nutrition/Amino


 Acids/Dextrose/


 Fat Emulsion


 Intravenous  1,512 ml @ 


 63 mls/hr  TPN  CONT  3/18/20 22:00


 3/19/20 21:59 DC 3/18/20 22:06


63 MLS/HR


 


 Sodium Chloride


 90 meq/Calcium


 Gluconate 10 meq/


 Multivitamins 10


 ml/Chromium/


 Copper/Manganese/


 Seleni/Zn 1 ml/


 Total Parenteral


 Nutrition/Amino


 Acids/Dextrose/


 Fat Emulsion


 Intravenous  55.005 ml


  @ 2.292


 mls/hr  TPN  CONT  3/18/20 22:00


 3/18/20 12:33 DC  





 


 Sodium Chloride


 90 meq/Magnesium


 Sulfate 10 meq/


 Calcium Gluconate


 20 meq/


 Multivitamins 10


 ml/Chromium/


 Copper/Manganese/


 Seleni/Zn 0.5 ml/


 Total Parenteral


 Nutrition/Amino


 Acids/Dextrose/


 Fat Emulsion


 Intravenous  1,512 ml @ 


 63 mls/hr  TPN  CONT  3/19/20 22:00


 3/20/20 21:59 DC 3/19/20 22:25


63 MLS/HR


 


 Sodium Chloride


 90 meq/Magnesium


 Sulfate 12 meq/


 Calcium Gluconate


 15 meq/


 Multivitamins 10


 ml/Chromium/


 Copper/Manganese/


 Seleni/Zn 0.5 ml/


 Insulin Human


 Regular 25 unit/


 Total Parenteral


 Nutrition/Amino


 Acids/Dextrose/


 Fat Emulsion


 Intravenous  1,400 ml @ 


 58.333 mls/


 hr  TPN  CONT  4/8/20 22:00


 4/9/20 21:59 DC 4/8/20 21:41


58.333 MLS/HR


 


 Sodium Chloride


 90 meq/Potassium


 Chloride 15 meq/


 Magnesium Sulfate


 12 meq/Calcium


 Gluconate 15 meq/


 Multivitamins 10


 ml/Chromium/


 Copper/Manganese/


 Seleni/Zn 0.5 ml/


 Insulin Human


 Regular 25 unit/


 Total Parenteral


 Nutrition/Amino


 Acids/Dextrose/


 Fat Emulsion


 Intravenous  1,400 ml @ 


 58.333 mls/


 hr  TPN  CONT  4/7/20 22:00


 4/8/20 21:59 DC 4/7/20 22:13


58.333 MLS/HR


 


 Sodium Chloride


 90 meq/Potassium


 Chloride 15 meq/


 Potassium


 Phosphate 10 mmol/


 Magnesium Sulfate


 8 meq/Calcium


 Gluconate 15 meq/


 Multivitamins 10


 ml/Chromium/


 Copper/Manganese/


 Seleni/Zn 0.5 ml/


 Insulin Human


 Regular 25 unit/


 Total Parenteral


 Nutrition/Amino


 Acids/Dextrose/


 Fat Emulsion


 Intravenous  1,400 ml @ 


 58.333 mls/


 hr  TPN  CONT  4/5/20 22:00


 4/6/20 21:59 DC 4/5/20 21:20


58.333 MLS/HR


 


 Sodium Chloride


 90 meq/Potassium


 Chloride 15 meq/


 Potassium


 Phosphate 10 mmol/


 Magnesium Sulfate


 10 meq/Calcium


 Gluconate 20 meq/


 Multivitamins 10


 ml/Chromium/


 Copper/Manganese/


 Seleni/Zn 0.5 ml/


 Total Parenteral


 Nutrition/Amino


 Acids/Dextrose/


 Fat Emulsion


 Intravenous  1,400 ml @ 


 58.333 mls/


 hr  TPN  CONT  3/23/20 22:00


 3/24/20 21:59 DC 3/23/20 21:42


58.333 MLS/HR


 


 Sodium Chloride


 90 meq/Potassium


 Chloride 15 meq/


 Potassium


 Phosphate 10 mmol/


 Magnesium Sulfate


 12 meq/Calcium


 Gluconate 15 meq/


 Multivitamins 10


 ml/Chromium/


 Copper/Manganese/


 Seleni/Zn 0.5 ml/


 Insulin Human


 Regular 25 unit/


 Total Parenteral


 Nutrition/Amino


 Acids/Dextrose/


 Fat Emulsion


 Intravenous  1,400 ml @ 


 58.333 mls/


 hr  TPN  CONT  4/6/20 22:00


 4/7/20 21:59 DC 4/6/20 22:24


58.333 MLS/HR


 


 Sodium Chloride


 90 meq/Potassium


 Chloride 15 meq/


 Potassium


 Phosphate 15 mmol/


 Magnesium Sulfate


 10 meq/Calcium


 Gluconate 15 meq/


 Multivitamins 10


 ml/Chromium/


 Copper/Manganese/


 Seleni/Zn 0.5 ml/


 Total Parenteral


 Nutrition/Amino


 Acids/Dextrose/


 Fat Emulsion


 Intravenous  1,400 ml @ 


 58.333 mls/


 hr  TPN  CONT  3/24/20 22:00


 3/25/20 21:59 DC 3/24/20 22:17


58.333 MLS/HR


 


 Sodium Chloride


 90 meq/Potassium


 Chloride 15 meq/


 Potassium


 Phosphate 15 mmol/


 Magnesium Sulfate


 10 meq/Calcium


 Gluconate 20 meq/


 Multivitamins 10


 ml/Chromium/


 Copper/Manganese/


 Seleni/Zn 0.5 ml/


 Total Parenteral


 Nutrition/Amino


 Acids/Dextrose/


 Fat Emulsion


 Intravenous  1,200 ml @ 


 50 mls/hr  TPN  CONT  3/22/20 22:00


 3/22/20 14:17 DC  





 


 Sodium Chloride


 90 meq/Potassium


 Chloride 15 meq/


 Potassium


 Phosphate 18 mmol/


 Magnesium Sulfate


 8 meq/Calcium


 Gluconate 15 meq/


 Multivitamins 10


 ml/Chromium/


 Copper/Manganese/


 Seleni/Zn 0.5 ml/


 Insulin Human


 Regular 10 unit/


 Total Parenteral


 Nutrition/Amino


 Acids/Dextrose/


 Fat Emulsion


 Intravenous  1,400 ml @ 


 58.333 mls/


 hr  TPN  CONT  3/27/20 22:00


 3/28/20 21:59 DC 3/27/20 21:43


58.333 MLS/HR


 


 Sodium Chloride


 90 meq/Potassium


 Chloride 15 meq/


 Potassium


 Phosphate 18 mmol/


 Magnesium Sulfate


 8 meq/Calcium


 Gluconate 15 meq/


 Multivitamins 10


 ml/Chromium/


 Copper/Manganese/


 Seleni/Zn 0.5 ml/


 Insulin Human


 Regular 15 unit/


 Total Parenteral


 Nutrition/Amino


 Acids/Dextrose/


 Fat Emulsion


 Intravenous  1,400 ml @ 


 58.333 mls/


 hr  TPN  CONT  3/30/20 22:00


 3/31/20 21:59 DC 3/30/20 21:47


58.333 MLS/HR


 


 Sodium Chloride


 90 meq/Potassium


 Chloride 15 meq/


 Potassium


 Phosphate 18 mmol/


 Magnesium Sulfate


 8 meq/Calcium


 Gluconate 15 meq/


 Multivitamins 10


 ml/Chromium/


 Copper/Manganese/


 Seleni/Zn 0.5 ml/


 Insulin Human


 Regular 20 unit/


 Total Parenteral


 Nutrition/Amino


 Acids/Dextrose/


 Fat Emulsion


 Intravenous  1,400 ml @ 


 58.333 mls/


 hr  TPN  CONT  4/2/20 22:00


 4/3/20 21:59 DC 4/2/20 22:45


58.333 MLS/HR


 


 Sodium Chloride


 90 meq/Potassium


 Chloride 15 meq/


 Potassium


 Phosphate 18 mmol/


 Magnesium Sulfate


 8 meq/Calcium


 Gluconate 15 meq/


 Multivitamins 10


 ml/Chromium/


 Copper/Manganese/


 Seleni/Zn 0.5 ml/


 Total Parenteral


 Nutrition/Amino


 Acids/Dextrose/


 Fat Emulsion


 Intravenous  1,400 ml @ 


 58.333 mls/


 hr  TPN  CONT  3/26/20 22:00


 3/27/20 21:59 DC 3/26/20 22:00


58.333 MLS/HR


 


 Sodium Chloride


 90 meq/Potassium


 Phosphate 15 mmol/


 Magnesium Sulfate


 12 meq/Calcium


 Gluconate 15 meq/


 Multivitamins 10


 ml/Chromium/


 Copper/Manganese/


 Seleni/Zn 0.5 ml/


 Insulin Human


 Regular 30 unit/


 Total Parenteral


 Nutrition/Amino


 Acids/Dextrose/


 Fat Emulsion


 Intravenous  1,400 ml @ 


 58.333 mls/


 hr  TPN  CONT  4/10/20 22:00


 4/11/20 21:59 DC 4/10/20 21:49


58.333 MLS/HR


 


 Sodium Chloride


 90 meq/Potassium


 Phosphate 15 mmol/


 Magnesium Sulfate


 12 meq/Calcium


 Gluconate 15 meq/


 Multivitamins 10


 ml/Chromium/


 Copper/Manganese/


 Seleni/Zn 0.5 ml/


 Insulin Human


 Regular 40 unit/


 Total Parenteral


 Nutrition/Amino


 Acids/Dextrose/


 Fat Emulsion


 Intravenous  1,400 ml @ 


 58.333 mls/


 hr  TPN  CONT  4/11/20 22:00


 4/12/20 21:59 DC 4/11/20 21:21


58.333 MLS/HR


 


 Sodium Chloride


 90 meq/Potassium


 Phosphate 19 mmol/


 Magnesium Sulfate


 12 meq/Calcium


 Gluconate 15 meq/


 Multivitamins 10


 ml/Chromium/


 Copper/Manganese/


 Seleni/Zn 0.5 ml/


 Insulin Human


 Regular 40 unit/


 Total Parenteral


 Nutrition/Amino


 Acids/Dextrose/


 Fat Emulsion


 Intravenous  1,400 ml @ 


 58.333 mls/


 hr  TPN  CONT  4/12/20 22:00


 4/13/20 21:59 DC 4/12/20 21:54


58.333 MLS/HR


 


 Sodium Chloride


 90 meq/Potassium


 Phosphate 5 mmol/


 Magnesium Sulfate


 12 meq/Calcium


 Gluconate 15 meq/


 Multivitamins 10


 ml/Chromium/


 Copper/Manganese/


 Seleni/Zn 0.5 ml/


 Insulin Human


 Regular 30 unit/


 Total Parenteral


 Nutrition/Amino


 Acids/Dextrose/


 Fat Emulsion


 Intravenous  1,400 ml @ 


 58.333 mls/


 hr  TPN  CONT  4/9/20 22:00


 4/10/20 21:59 DC 4/9/20 22:08


58.333 MLS/HR


 


 Sodium Chloride


 100 meq/Potassium


 Chloride 40 meq/


 Magnesium Sulfate


 15 meq/Calcium


 Gluconate 15 meq/


 Multivitamins 10


 ml/Chromium/


 Copper/Manganese/


 Seleni/Zn 0.5 ml/


 Insulin Human


 Regular 35 unit/


 Total Parenteral


 Nutrition/Amino


 Acids/Dextrose/


 Fat Emulsion


 Intravenous  1,400 ml @ 


 58.333 mls/


 hr  TPN  CONT  4/19/20 22:00


 4/20/20 21:59 DC 4/19/20 22:46


58.333 MLS/HR


 


 Sodium Chloride


 100 meq/Potassium


 Chloride 40 meq/


 Magnesium Sulfate


 20 meq/Calcium


 Gluconate 10 meq/


 Multivitamins 10


 ml/Chromium/


 Copper/Manganese/


 Seleni/Zn 0.5 ml/


 Insulin Human


 Regular 35 unit/


 Total Parenteral


 Nutrition/Amino


 Acids/Dextrose/


 Fat Emulsion


 Intravenous  1,400 ml @ 


 58.333 mls/


 hr  TPN  CONT  4/23/20 22:00


 4/24/20 21:59 DC 4/24/20 00:06


58.333 MLS/HR


 


 Sodium Chloride


 100 meq/Potassium


 Chloride 40 meq/


 Magnesium Sulfate


 20 meq/Calcium


 Gluconate 15 meq/


 Multivitamins 10


 ml/Chromium/


 Copper/Manganese/


 Seleni/Zn 0.5 ml/


 Insulin Human


 Regular 35 unit/


 Total Parenteral


 Nutrition/Amino


 Acids/Dextrose/


 Fat Emulsion


 Intravenous  1,400 ml @ 


 58.333 mls/


 hr  TPN  CONT  4/22/20 22:00


 4/23/20 21:59 DC 4/22/20 22:27


58.333 MLS/HR


 


 Sodium Chloride


 100 meq/Potassium


 Phosphate 10 mmol/


 Magnesium Sulfate


 12 meq/Calcium


 Gluconate 15 meq/


 Multivitamins 10


 ml/Chromium/


 Copper/Manganese/


 Seleni/Zn 0.5 ml/


 Insulin Human


 Regular 35 unit/


 Potassium


 Chloride 20 meq/


 Total Parenteral


 Nutrition/Amino


 Acids/Dextrose/


 Fat Emulsion


 Intravenous  1,400 ml @ 


 58.333 mls/


 hr  TPN  CONT  4/16/20 22:00


 4/17/20 21:59 DC 4/16/20 22:10


58.333 MLS/HR


 


 Sodium Chloride


 100 meq/Potassium


 Phosphate 19 mmol/


 Magnesium Sulfate


 12 meq/Calcium


 Gluconate 15 meq/


 Multivitamins 10


 ml/Chromium/


 Copper/Manganese/


 Seleni/Zn 0.5 ml/


 Insulin Human


 Regular 40 unit/


 Potassium


 Chloride 20 meq/


 Total Parenteral


 Nutrition/Amino


 Acids/Dextrose/


 Fat Emulsion


 Intravenous  1,400 ml @ 


 58.333 mls/


 hr  TPN  CONT  4/15/20 22:00


 4/16/20 21:59 DC 4/15/20 21:20


58.333 MLS/HR


 


 Sodium Chloride


 100 meq/Potassium


 Phosphate 5 mmol/


 Magnesium Sulfate


 12 meq/Calcium


 Gluconate 15 meq/


 Multivitamins 10


 ml/Chromium/


 Copper/Manganese/


 Seleni/Zn 0.5 ml/


 Insulin Human


 Regular 35 unit/


 Potassium


 Chloride 20 meq/


 Total Parenteral


 Nutrition/Amino


 Acids/Dextrose/


 Fat Emulsion


 Intravenous  1,400 ml @ 


 58.333 mls/


 hr  TPN  CONT  4/17/20 22:00


 4/18/20 21:59 DC 4/17/20 22:59


58.333 MLS/HR


 


 Succinylcholine


 Chloride


  (Anectine)  120 mg  1X  ONCE  3/23/20 08:30


 3/23/20 08:31 DC 3/23/20 08:34


120 MG











Labs:


Lab





Laboratory Tests








Test


 4/30/20


08:20 4/30/20


12:10 4/30/20


17:26 5/1/20


00:59


 


White Blood Count


 11.3 x10^3/uL


(4.0-11.0) 


 


 





 


Red Blood Count


 2.66 x10^6/uL


(3.50-5.40) 


 


 





 


Hemoglobin


 7.7 g/dL


(12.0-15.5) 


 


 





 


Hematocrit


 24.5 %


(36.0-47.0) 


 


 





 


Mean Corpuscular Volume 92 fL ()    


 


Mean Corpuscular Hemoglobin 29 pg (25-35)    


 


Mean Corpuscular Hemoglobin


Concent 32 g/dL


(31-37) 


 


 





 


Red Cell Distribution Width


 18.6 %


(11.5-14.5) 


 


 





 


Platelet Count


 411 x10^3/uL


(140-400) 


 


 





 


Sodium Level


 152 mmol/L


(136-145) 


 


 





 


Potassium Level


 3.6 mmol/L


(3.5-5.1) 


 


 





 


Chloride Level


 113 mmol/L


() 


 


 





 


Carbon Dioxide Level


 33 mmol/L


(21-32) 


 


 





 


Anion Gap 6 (6-14)    


 


Blood Urea Nitrogen


 51 mg/dL


(7-20) 


 


 





 


Creatinine


 1.1 mg/dL


(0.6-1.0) 


 


 





 


Estimated GFR


(Cockcroft-Gault) 52.8 


 


 


 





 


Glucose Level


 119 mg/dL


(70-99) 


 


 





 


Calcium Level


 8.5 mg/dL


(8.5-10.1) 


 


 





 


Magnesium Level


 1.7 mg/dL


(1.8-2.4) 


 


 





 


Glucose (Fingerstick)


 


 169 mg/dL


(70-99) 152 mg/dL


(70-99) 152 mg/dL


(70-99)


 


Test


 5/1/20


06:27 5/1/20


06:30 


 





 


Glucose (Fingerstick)


 139 mg/dL


(70-99) 


 


 





 


White Blood Count


 


 14.7 x10^3/uL


(4.0-11.0) 


 





 


Red Blood Count


 


 2.78 x10^6/uL


(3.50-5.40) 


 





 


Hemoglobin


 


 8.1 g/dL


(12.0-15.5) 


 





 


Hematocrit


 


 25.3 %


(36.0-47.0) 


 





 


Mean Corpuscular Volume  91 fL ()   


 


Mean Corpuscular Hemoglobin  29 pg (25-35)   


 


Mean Corpuscular Hemoglobin


Concent 


 32 g/dL


(31-37) 


 





 


Red Cell Distribution Width


 


 18.7 %


(11.5-14.5) 


 





 


Platelet Count


 


 442 x10^3/uL


(140-400) 


 





 


Neutrophils (%) (Auto)  80 % (31-73)   


 


Lymphocytes (%) (Auto)  15 % (24-48)   


 


Monocytes (%) (Auto)  4 % (0-9)   


 


Eosinophils (%) (Auto)  1 % (0-3)   


 


Basophils (%) (Auto)  0 % (0-3)   


 


Neutrophils # (Auto)


 


 11.7 x10^3/uL


(1.8-7.7) 


 





 


Lymphocytes # (Auto)


 


 2.2 x10^3/uL


(1.0-4.8) 


 





 


Monocytes # (Auto)


 


 0.5 x10^3/uL


(0.0-1.1) 


 





 


Eosinophils # (Auto)


 


 0.1 x10^3/uL


(0.0-0.7) 


 





 


Basophils # (Auto)


 


 0.0 x10^3/uL


(0.0-0.2) 


 





 


Sodium Level


 


 153 mmol/L


(136-145) 


 





 


Potassium Level


 


 3.3 mmol/L


(3.5-5.1) 


 





 


Chloride Level


 


 111 mmol/L


() 


 





 


Carbon Dioxide Level


 


 33 mmol/L


(21-32) 


 





 


Anion Gap  9 (6-14)   


 


Blood Urea Nitrogen


 


 47 mg/dL


(7-20) 


 





 


Creatinine


 


 0.9 mg/dL


(0.6-1.0) 


 





 


Estimated GFR


(Cockcroft-Gault) 


 66.5 


 


 





 


Glucose Level


 


 150 mg/dL


(70-99) 


 





 


Calcium Level


 


 8.7 mg/dL


(8.5-10.1) 


 





 


Phosphorus Level


 


 4.6 mg/dL


(2.6-4.7) 


 





 


Magnesium Level


 


 1.8 mg/dL


(1.8-2.4) 


 














Objective:


Assessment:


Fever intermittent could be from underlying pancreatitis


Acute pancreatitis with persistent  necrosis


CT a/p 4/9


    Increased ascites. Persistent evidence of necrotizing pancreatitis with 

fluid and phlegmon


   at the pancreas


   4/27 status post ROBERT drain placement; 


Cholelithiasis with thickening of the gallbladder wall.


Leucocytosis improving


JED,Hyperkalemia, Metabolic acidosis off dialysis


Acute hypoxic resp failure ,bilateral pleural effusion and atelectasis


hypocalcemia 


Prediabetes


HTN


s/p trach





Plan:


Plan of Care


fever could be from underlying pancreatitis


cont merrem (4/8), 


off micafungin and daptomycin 4/30


Patient has been on empiric broad-spectrum antibiotics since admission


Low threshold to restart above antibiotics if patient has any hemodynamic 

instability or recurrence of fever


Central line has been removed


Bilateral upper extremity Doppler ultrasound neg for  DVT


Follow-up cultures and lab


Monitor for abx toxicities. 


Maintain aspiration precautions 


PT and OT as tolerated


Patient remains critically ill


D/w nursing











IVAN FRANZ MD            May 1, 2020 07:43

## 2020-05-01 NOTE — PDOC
PULMONARY PROGRESS NOTES


Subjective


Patient intubated on 3/23 , s/p trach 4/6, on vent


Taken to the OR 4/27, NO SURGERY DONE / NOT A CANDIDATE


DID TS ALL NIGHT  YESTERDAY/ PM VALVE


Vitals





Vital Signs








  Date Time  Temp Pulse Resp B/P (MAP) Pulse Ox O2 Delivery O2 Flow Rate FiO2


 


5/1/20 12:00      Trach Collar  


 


5/1/20 12:00 99.5 120 26 129/62 (84)    





 99.5       


 


5/1/20 11:33     99   


 


5/1/20 07:05       10.0 








Comments


ros unable to obtain  on vent


General:  Alert


HEENT:  Other (nc at perrl   nose clear  nech  trach site ok  no lad   no 

thyromegaly)


Lungs:  Crackles


Cardiovascular:  S1, S2


Abdomen:  Soft, Non-tender, Other (distended)


Neuro Exam:  Alert


Extremities:  Other (+3 generalized edema )


Skin:  Warm, Dry


Labs





Laboratory Tests








Test


 4/29/20


17:07 4/30/20


00:34 4/30/20


08:20 4/30/20


12:10


 


Glucose (Fingerstick)


 133 mg/dL


(70-99) 112 mg/dL


(70-99) 


 169 mg/dL


(70-99)


 


White Blood Count


 


 


 11.3 x10^3/uL


(4.0-11.0) 





 


Red Blood Count


 


 


 2.66 x10^6/uL


(3.50-5.40) 





 


Hemoglobin


 


 


 7.7 g/dL


(12.0-15.5) 





 


Hematocrit


 


 


 24.5 %


(36.0-47.0) 





 


Mean Corpuscular Volume   92 fL ()  


 


Mean Corpuscular Hemoglobin   29 pg (25-35)  


 


Mean Corpuscular Hemoglobin


Concent 


 


 32 g/dL


(31-37) 





 


Red Cell Distribution Width


 


 


 18.6 %


(11.5-14.5) 





 


Platelet Count


 


 


 411 x10^3/uL


(140-400) 





 


Sodium Level


 


 


 152 mmol/L


(136-145) 





 


Potassium Level


 


 


 3.6 mmol/L


(3.5-5.1) 





 


Chloride Level


 


 


 113 mmol/L


() 





 


Carbon Dioxide Level


 


 


 33 mmol/L


(21-32) 





 


Anion Gap   6 (6-14)  


 


Blood Urea Nitrogen


 


 


 51 mg/dL


(7-20) 





 


Creatinine


 


 


 1.1 mg/dL


(0.6-1.0) 





 


Estimated GFR


(Cockcroft-Gault) 


 


 52.8 


 





 


Glucose Level


 


 


 119 mg/dL


(70-99) 





 


Calcium Level


 


 


 8.5 mg/dL


(8.5-10.1) 





 


Magnesium Level


 


 


 1.7 mg/dL


(1.8-2.4) 





 


Test


 4/30/20


17:26 5/1/20


00:59 5/1/20


06:27 5/1/20


06:30


 


Glucose (Fingerstick)


 152 mg/dL


(70-99) 152 mg/dL


(70-99) 139 mg/dL


(70-99) 





 


White Blood Count


 


 


 


 14.7 x10^3/uL


(4.0-11.0)


 


Red Blood Count


 


 


 


 2.78 x10^6/uL


(3.50-5.40)


 


Hemoglobin


 


 


 


 8.1 g/dL


(12.0-15.5)


 


Hematocrit


 


 


 


 25.3 %


(36.0-47.0)


 


Mean Corpuscular Volume    91 fL () 


 


Mean Corpuscular Hemoglobin    29 pg (25-35) 


 


Mean Corpuscular Hemoglobin


Concent 


 


 


 32 g/dL


(31-37)


 


Red Cell Distribution Width


 


 


 


 18.7 %


(11.5-14.5)


 


Platelet Count


 


 


 


 442 x10^3/uL


(140-400)


 


Neutrophils (%) (Auto)    80 % (31-73) 


 


Lymphocytes (%) (Auto)    15 % (24-48) 


 


Monocytes (%) (Auto)    4 % (0-9) 


 


Eosinophils (%) (Auto)    1 % (0-3) 


 


Basophils (%) (Auto)    0 % (0-3) 


 


Neutrophils # (Auto)


 


 


 


 11.7 x10^3/uL


(1.8-7.7)


 


Lymphocytes # (Auto)


 


 


 


 2.2 x10^3/uL


(1.0-4.8)


 


Monocytes # (Auto)


 


 


 


 0.5 x10^3/uL


(0.0-1.1)


 


Eosinophils # (Auto)


 


 


 


 0.1 x10^3/uL


(0.0-0.7)


 


Basophils # (Auto)


 


 


 


 0.0 x10^3/uL


(0.0-0.2)


 


Sodium Level


 


 


 


 153 mmol/L


(136-145)


 


Potassium Level


 


 


 


 3.3 mmol/L


(3.5-5.1)


 


Chloride Level


 


 


 


 111 mmol/L


()


 


Carbon Dioxide Level


 


 


 


 33 mmol/L


(21-32)


 


Anion Gap    9 (6-14) 


 


Blood Urea Nitrogen


 


 


 


 47 mg/dL


(7-20)


 


Creatinine


 


 


 


 0.9 mg/dL


(0.6-1.0)


 


Estimated GFR


(Cockcroft-Gault) 


 


 


 66.5 





 


Glucose Level


 


 


 


 150 mg/dL


(70-99)


 


Calcium Level


 


 


 


 8.7 mg/dL


(8.5-10.1)


 


Phosphorus Level


 


 


 


 4.6 mg/dL


(2.6-4.7)


 


Magnesium Level


 


 


 


 1.8 mg/dL


(1.8-2.4)


 


Test


 5/1/20


12:20 


 


 





 


Glucose (Fingerstick)


 147 mg/dL


(70-99) 


 


 











Laboratory Tests








Test


 4/30/20


17:26 5/1/20


00:59 5/1/20


06:27 5/1/20


06:30


 


Glucose (Fingerstick)


 152 mg/dL


(70-99) 152 mg/dL


(70-99) 139 mg/dL


(70-99) 





 


White Blood Count


 


 


 


 14.7 x10^3/uL


(4.0-11.0)


 


Red Blood Count


 


 


 


 2.78 x10^6/uL


(3.50-5.40)


 


Hemoglobin


 


 


 


 8.1 g/dL


(12.0-15.5)


 


Hematocrit


 


 


 


 25.3 %


(36.0-47.0)


 


Mean Corpuscular Volume    91 fL () 


 


Mean Corpuscular Hemoglobin    29 pg (25-35) 


 


Mean Corpuscular Hemoglobin


Concent 


 


 


 32 g/dL


(31-37)


 


Red Cell Distribution Width


 


 


 


 18.7 %


(11.5-14.5)


 


Platelet Count


 


 


 


 442 x10^3/uL


(140-400)


 


Neutrophils (%) (Auto)    80 % (31-73) 


 


Lymphocytes (%) (Auto)    15 % (24-48) 


 


Monocytes (%) (Auto)    4 % (0-9) 


 


Eosinophils (%) (Auto)    1 % (0-3) 


 


Basophils (%) (Auto)    0 % (0-3) 


 


Neutrophils # (Auto)


 


 


 


 11.7 x10^3/uL


(1.8-7.7)


 


Lymphocytes # (Auto)


 


 


 


 2.2 x10^3/uL


(1.0-4.8)


 


Monocytes # (Auto)


 


 


 


 0.5 x10^3/uL


(0.0-1.1)


 


Eosinophils # (Auto)


 


 


 


 0.1 x10^3/uL


(0.0-0.7)


 


Basophils # (Auto)


 


 


 


 0.0 x10^3/uL


(0.0-0.2)


 


Sodium Level


 


 


 


 153 mmol/L


(136-145)


 


Potassium Level


 


 


 


 3.3 mmol/L


(3.5-5.1)


 


Chloride Level


 


 


 


 111 mmol/L


()


 


Carbon Dioxide Level


 


 


 


 33 mmol/L


(21-32)


 


Anion Gap    9 (6-14) 


 


Blood Urea Nitrogen


 


 


 


 47 mg/dL


(7-20)


 


Creatinine


 


 


 


 0.9 mg/dL


(0.6-1.0)


 


Estimated GFR


(Cockcroft-Gault) 


 


 


 66.5 





 


Glucose Level


 


 


 


 150 mg/dL


(70-99)


 


Calcium Level


 


 


 


 8.7 mg/dL


(8.5-10.1)


 


Phosphorus Level


 


 


 


 4.6 mg/dL


(2.6-4.7)


 


Magnesium Level


 


 


 


 1.8 mg/dL


(1.8-2.4)


 


Test


 5/1/20


12:20 


 


 





 


Glucose (Fingerstick)


 147 mg/dL


(70-99) 


 


 











Medications





Active Scripts








 Medications  Dose


 Route/Sig


 Max Daily Dose Days Date Category


 


 Bisoprolol


 Fumarate 5 Mg


 Tablet  10 Mg


 PO DAILY


   3/16/20 Reported








Comments


cxr reviewed.





Impression


.


IMPRESSION:


1.  Acute hypoxemic respiratory failure secondary to ARDS status post trach,


2.  Gallstone pancreatitis


3.  Severe metabolic acidosis.stable


4.  Acute kidney injury-stable, ON HD-- continue to improve 


5.  Acute gallstone pancreatitis.


6.  Hypoalbuminemia.


7.  Moderate persistent effusions


8.  Fever-  Per ID, per surgery


9.  Chronic anemia


10. Covid 19 testing negative


11. Moderate to large ascites-S/P paracentisis


S/P paracentisis with 4 liters removed on 4/15/20














Surgery note


Operative Note:


After obtaining informed consent, patient was taken to OR, induced under GETA 

and prepped in the usual fashion.  5 mm port placed umbilical and right mid 

abdomen, all under laparoscopic guidance.  Large amount of ascites encountered 

and aspirated off.  Fluid was clear with some whitish debris.  Viscera was 

completely locked in with obliteration of all planes, preventing any significant

exploration.  Copious irrigation.





Given patient's overall clinical improvement, favor against open procedure and 

attempt at cholecystectomy and/or necrosectomy, given high risk of compl

ications.  Cholecystectomy will need to be performed, but favor waiting 3 

months.





19 ROBERT drain placed and secured with 3 0 nylon.  Skin repaired with 4 0 monocryl.

 Dressing placed.





Patient tolerated procedure well and sent to PACU in stable condition.  All 

counts correct.  Wound class is 4.





Plan


.


We will continue our efforts at weaning


 TS trials, 24 hrs as tolerated/ PM valve


precedex for anxiety, lower dose


Follow surgery input


hep sq and protonix for prophylaxis


Follow ID rec, abx per id


Follow nephrology recs 


Nutritional support per surgery


continue TPN for nutrition 


DVT/GI PPX 


d/w RN/RT











SHARYN SOLORZANO MD                  May 1, 2020 12:57

## 2020-05-01 NOTE — PDOC
Renal-Progress Notes


Subjective Notes


Notes


ABLE TO TALK SOME





History of Present Illness


Hx of present illness


SLOWLY IMPROVING





Vitals


Vitals





Vital Signs








  Date Time  Temp Pulse Resp B/P (MAP) Pulse Ox O2 Delivery O2 Flow Rate FiO2


 


5/1/20 12:00      Trach Collar  


 


5/1/20 12:00  120 26 129/62 (84)    


 


5/1/20 11:33     99   


 


5/1/20 08:00 98.6       





 98.6       


 


5/1/20 07:05       10.0 








Weight


Weight [ ]





I.O.


Intake and Output











Intake and Output 


 


 5/1/20





 07:00


 


Intake Total 2526 ml


 


Output Total 6785 ml


 


Balance -4259 ml


 


 


 


Intake Oral 0 ml


 


IV Total 2526 ml


 


Output Urine Total 6740 ml


 


Drainage Total 45 ml











Labs


Labs





Laboratory Tests








Test


 4/30/20


17:26 5/1/20


00:59 5/1/20


06:27 5/1/20


06:30


 


Glucose (Fingerstick)


 152 mg/dL


(70-99) 152 mg/dL


(70-99) 139 mg/dL


(70-99) 





 


White Blood Count


 


 


 


 14.7 x10^3/uL


(4.0-11.0)


 


Red Blood Count


 


 


 


 2.78 x10^6/uL


(3.50-5.40)


 


Hemoglobin


 


 


 


 8.1 g/dL


(12.0-15.5)


 


Hematocrit


 


 


 


 25.3 %


(36.0-47.0)


 


Mean Corpuscular Volume    91 fL () 


 


Mean Corpuscular Hemoglobin    29 pg (25-35) 


 


Mean Corpuscular Hemoglobin


Concent 


 


 


 32 g/dL


(31-37)


 


Red Cell Distribution Width


 


 


 


 18.7 %


(11.5-14.5)


 


Platelet Count


 


 


 


 442 x10^3/uL


(140-400)


 


Neutrophils (%) (Auto)    80 % (31-73) 


 


Lymphocytes (%) (Auto)    15 % (24-48) 


 


Monocytes (%) (Auto)    4 % (0-9) 


 


Eosinophils (%) (Auto)    1 % (0-3) 


 


Basophils (%) (Auto)    0 % (0-3) 


 


Neutrophils # (Auto)


 


 


 


 11.7 x10^3/uL


(1.8-7.7)


 


Lymphocytes # (Auto)


 


 


 


 2.2 x10^3/uL


(1.0-4.8)


 


Monocytes # (Auto)


 


 


 


 0.5 x10^3/uL


(0.0-1.1)


 


Eosinophils # (Auto)


 


 


 


 0.1 x10^3/uL


(0.0-0.7)


 


Basophils # (Auto)


 


 


 


 0.0 x10^3/uL


(0.0-0.2)


 


Sodium Level


 


 


 


 153 mmol/L


(136-145)


 


Potassium Level


 


 


 


 3.3 mmol/L


(3.5-5.1)


 


Chloride Level


 


 


 


 111 mmol/L


()


 


Carbon Dioxide Level


 


 


 


 33 mmol/L


(21-32)


 


Anion Gap    9 (6-14) 


 


Blood Urea Nitrogen


 


 


 


 47 mg/dL


(7-20)


 


Creatinine


 


 


 


 0.9 mg/dL


(0.6-1.0)


 


Estimated GFR


(Cockcroft-Gault) 


 


 


 66.5 





 


Glucose Level


 


 


 


 150 mg/dL


(70-99)


 


Calcium Level


 


 


 


 8.7 mg/dL


(8.5-10.1)


 


Phosphorus Level


 


 


 


 4.6 mg/dL


(2.6-4.7)


 


Magnesium Level


 


 


 


 1.8 mg/dL


(1.8-2.4)


 


Test


 5/1/20


12:20 


 


 





 


Glucose (Fingerstick)


 147 mg/dL


(70-99) 


 


 














Micro


Micro





Microbiology


4/29/20 Blood Culture - Preliminary, Resulted


          NO GROWTH AFTER 1 DAY


4/27/20 Aerobic and Anaerobic Culture, Resulted


          Pending


4/27/20 Anaerobic Culture Result 1 (ANSON), Resulted


          Pending


4/27/20 Aerobic Culture, Resulted


          Pending


4/27/20 Aerobic Culture Result 1 (ANSON), Resulted


          Pending


4/27/20 Gram Stain - Final, Resulted


          


4/27/20 Gram Stain Result 1 (ANSON) - Final, Resulted


          


4/27/20 Gram Stain Result 2 (ANSON) - Final, Resulted


          


4/12/20 Urine Culture - Final, Complete


          


4/12/20 Urine Culture Result 1 (ANSON) - Final, Complete





Review of Systems


Constitutional:  yes: other (DIFFICULT FOR HER TO TALK)





Physical Exam


General Appearance:  other (ON THE VENT, TRACH)


Skin:  warm


Respiratory:  decreased breath sounds


Heart:  S1S2


Abdomen:  soft, bowel sounds present


Genitourinary:  bladder flat


Extremities:  pulses present, edema


Neurology:  alert, oriented, other





Assessment


Assessment


IMP





FEVER AND LEUCOCYTOSIS-?SEPSIS


HYPERNATREMIA-STABLE


JED-ATN-MUCH IMPROVED AND OFF HD FOR NOW


ANEMIA


ANASARCA DUE TO 3RD SPACING


HYPERKALEMIA-RESOLVED


ACIDOSIS AND ACIDEMIA-RESOLVED


ACUTE REPS FAILURE-TRACH


ACUTE PANCREATITIS


MALNUTRITION





PLAN





CONT TO HOLD HD FOR NOW


TPN TO CONTINUE AS NEEDED


PRBC AS NEEDED


START YOLI IF NEEDED


VENT SUPPORT


CONT IV LASIX


ANTIBIOTICS-ID EVAL AND TX


WILL NEED LTAC


WILL FOLLOW











BETH HEIN MD                  May 1, 2020 12:36

## 2020-05-02 VITALS — SYSTOLIC BLOOD PRESSURE: 97 MMHG | DIASTOLIC BLOOD PRESSURE: 56 MMHG

## 2020-05-02 VITALS — DIASTOLIC BLOOD PRESSURE: 77 MMHG | SYSTOLIC BLOOD PRESSURE: 132 MMHG

## 2020-05-02 VITALS — DIASTOLIC BLOOD PRESSURE: 61 MMHG | SYSTOLIC BLOOD PRESSURE: 112 MMHG

## 2020-05-02 VITALS — DIASTOLIC BLOOD PRESSURE: 67 MMHG | SYSTOLIC BLOOD PRESSURE: 100 MMHG

## 2020-05-02 VITALS — SYSTOLIC BLOOD PRESSURE: 104 MMHG | DIASTOLIC BLOOD PRESSURE: 62 MMHG

## 2020-05-02 VITALS — DIASTOLIC BLOOD PRESSURE: 59 MMHG | SYSTOLIC BLOOD PRESSURE: 118 MMHG

## 2020-05-02 VITALS — DIASTOLIC BLOOD PRESSURE: 47 MMHG | SYSTOLIC BLOOD PRESSURE: 95 MMHG

## 2020-05-02 VITALS — SYSTOLIC BLOOD PRESSURE: 109 MMHG | DIASTOLIC BLOOD PRESSURE: 78 MMHG

## 2020-05-02 VITALS — SYSTOLIC BLOOD PRESSURE: 118 MMHG | DIASTOLIC BLOOD PRESSURE: 71 MMHG

## 2020-05-02 VITALS — SYSTOLIC BLOOD PRESSURE: 104 MMHG | DIASTOLIC BLOOD PRESSURE: 52 MMHG

## 2020-05-02 VITALS — DIASTOLIC BLOOD PRESSURE: 67 MMHG | SYSTOLIC BLOOD PRESSURE: 110 MMHG

## 2020-05-02 VITALS — SYSTOLIC BLOOD PRESSURE: 177 MMHG | DIASTOLIC BLOOD PRESSURE: 80 MMHG

## 2020-05-02 VITALS — DIASTOLIC BLOOD PRESSURE: 56 MMHG | SYSTOLIC BLOOD PRESSURE: 141 MMHG

## 2020-05-02 VITALS — DIASTOLIC BLOOD PRESSURE: 77 MMHG | SYSTOLIC BLOOD PRESSURE: 144 MMHG

## 2020-05-02 VITALS — DIASTOLIC BLOOD PRESSURE: 70 MMHG | SYSTOLIC BLOOD PRESSURE: 118 MMHG

## 2020-05-02 VITALS — SYSTOLIC BLOOD PRESSURE: 110 MMHG | DIASTOLIC BLOOD PRESSURE: 63 MMHG

## 2020-05-02 VITALS — SYSTOLIC BLOOD PRESSURE: 173 MMHG | DIASTOLIC BLOOD PRESSURE: 85 MMHG

## 2020-05-02 VITALS — SYSTOLIC BLOOD PRESSURE: 163 MMHG | DIASTOLIC BLOOD PRESSURE: 76 MMHG

## 2020-05-02 VITALS — SYSTOLIC BLOOD PRESSURE: 95 MMHG | DIASTOLIC BLOOD PRESSURE: 54 MMHG

## 2020-05-02 VITALS — SYSTOLIC BLOOD PRESSURE: 134 MMHG | DIASTOLIC BLOOD PRESSURE: 80 MMHG

## 2020-05-02 VITALS — DIASTOLIC BLOOD PRESSURE: 47 MMHG | SYSTOLIC BLOOD PRESSURE: 116 MMHG

## 2020-05-02 VITALS — DIASTOLIC BLOOD PRESSURE: 59 MMHG | SYSTOLIC BLOOD PRESSURE: 91 MMHG

## 2020-05-02 VITALS — SYSTOLIC BLOOD PRESSURE: 113 MMHG | DIASTOLIC BLOOD PRESSURE: 64 MMHG

## 2020-05-02 LAB
ANION GAP SERPL CALC-SCNC: 9 MMOL/L (ref 6–14)
BASOPHILS # BLD AUTO: 0 X10^3/UL (ref 0–0.2)
BASOPHILS NFR BLD: 0 % (ref 0–3)
BUN SERPL-MCNC: 45 MG/DL (ref 7–20)
CALCIUM SERPL-MCNC: 8.6 MG/DL (ref 8.5–10.1)
CHLORIDE SERPL-SCNC: 111 MMOL/L (ref 98–107)
CO2 SERPL-SCNC: 34 MMOL/L (ref 21–32)
CREAT SERPL-MCNC: 1 MG/DL (ref 0.6–1)
EOSINOPHIL NFR BLD: 0.2 X10^3/UL (ref 0–0.7)
EOSINOPHIL NFR BLD: 2 % (ref 0–3)
ERYTHROCYTE [DISTWIDTH] IN BLOOD BY AUTOMATED COUNT: 18.1 % (ref 11.5–14.5)
GFR SERPLBLD BASED ON 1.73 SQ M-ARVRAT: 58.9 ML/MIN
GLUCOSE SERPL-MCNC: 153 MG/DL (ref 70–99)
HCT VFR BLD CALC: 22.4 % (ref 36–47)
HGB BLD-MCNC: 7.2 G/DL (ref 12–15.5)
LYMPHOCYTES # BLD: 1.5 X10^3/UL (ref 1–4.8)
LYMPHOCYTES NFR BLD AUTO: 16 % (ref 24–48)
MAGNESIUM SERPL-MCNC: 1.8 MG/DL (ref 1.8–2.4)
MCH RBC QN AUTO: 29 PG (ref 25–35)
MCHC RBC AUTO-ENTMCNC: 32 G/DL (ref 31–37)
MCV RBC AUTO: 91 FL (ref 79–100)
MONO #: 0.4 X10^3/UL (ref 0–1.1)
MONOCYTES NFR BLD: 5 % (ref 0–9)
NEUT #: 7.4 X10^3/UL (ref 1.8–7.7)
NEUTROPHILS NFR BLD AUTO: 77 % (ref 31–73)
PHOSPHATE SERPL-MCNC: 3.6 MG/DL (ref 2.6–4.7)
PLATELET # BLD AUTO: 419 X10^3/UL (ref 140–400)
POTASSIUM SERPL-SCNC: 2.9 MMOL/L (ref 3.5–5.1)
RBC # BLD AUTO: 2.46 X10^6/UL (ref 3.5–5.4)
SODIUM SERPL-SCNC: 154 MMOL/L (ref 136–145)
WBC # BLD AUTO: 9.7 X10^3/UL (ref 4–11)

## 2020-05-02 RX ADMIN — MEROPENEM SCH MLS/HR: 1 INJECTION, POWDER, FOR SOLUTION INTRAVENOUS at 05:42

## 2020-05-02 RX ADMIN — DEXMEDETOMIDINE HYDROCHLORIDE PRN MLS/HR: 100 INJECTION, SOLUTION, CONCENTRATE INTRAVENOUS at 10:10

## 2020-05-02 RX ADMIN — POTASSIUM CHLORIDE SCH MLS/HR: 200 INJECTION, SOLUTION INTRAVENOUS at 13:05

## 2020-05-02 RX ADMIN — BUMETANIDE SCH MG: 0.25 INJECTION INTRAMUSCULAR; INTRAVENOUS at 09:00

## 2020-05-02 RX ADMIN — MEROPENEM SCH MLS/HR: 1 INJECTION, POWDER, FOR SOLUTION INTRAVENOUS at 23:23

## 2020-05-02 RX ADMIN — MEROPENEM SCH MLS/HR: 1 INJECTION, POWDER, FOR SOLUTION INTRAVENOUS at 14:00

## 2020-05-02 RX ADMIN — DEXMEDETOMIDINE HYDROCHLORIDE PRN MLS/HR: 100 INJECTION, SOLUTION, CONCENTRATE INTRAVENOUS at 16:09

## 2020-05-02 RX ADMIN — Medication PRN EACH: at 11:09

## 2020-05-02 RX ADMIN — ONDANSETRON PRN MG: 2 INJECTION INTRAMUSCULAR; INTRAVENOUS at 13:05

## 2020-05-02 RX ADMIN — INSULIN LISPRO SCH UNITS: 100 INJECTION, SOLUTION INTRAVENOUS; SUBCUTANEOUS at 12:00

## 2020-05-02 RX ADMIN — DEXMEDETOMIDINE HYDROCHLORIDE PRN MLS/HR: 100 INJECTION, SOLUTION, CONCENTRATE INTRAVENOUS at 05:41

## 2020-05-02 RX ADMIN — DEXMEDETOMIDINE HYDROCHLORIDE PRN MLS/HR: 100 INJECTION, SOLUTION, CONCENTRATE INTRAVENOUS at 02:05

## 2020-05-02 RX ADMIN — DEXMEDETOMIDINE HYDROCHLORIDE PRN MLS/HR: 100 INJECTION, SOLUTION, CONCENTRATE INTRAVENOUS at 20:24

## 2020-05-02 RX ADMIN — INSULIN LISPRO SCH UNITS: 100 INJECTION, SOLUTION INTRAVENOUS; SUBCUTANEOUS at 05:48

## 2020-05-02 RX ADMIN — POTASSIUM CHLORIDE SCH MLS/HR: 200 INJECTION, SOLUTION INTRAVENOUS at 09:59

## 2020-05-02 RX ADMIN — INSULIN LISPRO SCH UNITS: 100 INJECTION, SOLUTION INTRAVENOUS; SUBCUTANEOUS at 00:00

## 2020-05-02 RX ADMIN — POTASSIUM CHLORIDE SCH MLS/HR: 200 INJECTION, SOLUTION INTRAVENOUS at 08:19

## 2020-05-02 RX ADMIN — DEXMEDETOMIDINE HYDROCHLORIDE PRN MLS/HR: 100 INJECTION, SOLUTION, CONCENTRATE INTRAVENOUS at 23:22

## 2020-05-02 RX ADMIN — PANTOPRAZOLE SODIUM SCH MG: 40 INJECTION, POWDER, FOR SOLUTION INTRAVENOUS at 08:20

## 2020-05-02 RX ADMIN — POTASSIUM CHLORIDE SCH MLS/HR: 200 INJECTION, SOLUTION INTRAVENOUS at 07:29

## 2020-05-02 RX ADMIN — Medication PRN MLS/HR: at 16:08

## 2020-05-02 RX ADMIN — INSULIN LISPRO SCH UNITS: 100 INJECTION, SOLUTION INTRAVENOUS; SUBCUTANEOUS at 18:00

## 2020-05-02 RX ADMIN — HEPARIN SODIUM SCH UNIT: 5000 INJECTION, SOLUTION INTRAVENOUS; SUBCUTANEOUS at 21:54

## 2020-05-02 RX ADMIN — HEPARIN SODIUM SCH UNIT: 5000 INJECTION, SOLUTION INTRAVENOUS; SUBCUTANEOUS at 10:09

## 2020-05-02 NOTE — PDOC
SUBJECTIVE


ROS


stable , sitting up in bed  , states she is feeling thirsty





OBJECTIVE


Vital Signs





Vital Signs








  Date Time  Temp Pulse Resp B/P (MAP) Pulse Ox O2 Delivery O2 Flow Rate FiO2


 


5/2/20 10:00  136 32 163/76 (105) 100 Tracheal Collar  


 


5/2/20 08:00 99.9       





 99.9       


 


5/2/20 00:00        








I & 0











Intake and Output 


 


 5/2/20





 06:59


 


Intake Total 3127.0 ml


 


Output Total 4170 ml


 


Balance -1043.0 ml


 


 


 


IV Total 3127.0 ml


 


Output Urine Total 4085 ml


 


Drainage Total 85 ml











PHYSICAL EXAM


Physical Exam


GENERAL: Propped up in bed, 


HEENT:  oral cavity dry 


NECK:  Trach/vent 


LUNGS: Non labored  


HEART:  S1, S2, regular 


ABDOMEN: Distended, hypoactive BS, drain placement (4/27 )


: [place] (4/14)


EXTREMITIES: Generalized edema,


DERMATOLOGIC:  Warm and dry.  No generalized rash.





DIAGNOSIS/ASSESSMENT


Assessment & Plan





ARF-- ATN,requiring CRRT and HD 


Has been Off HD, HDC removed  


Stable renal function, Excellent UOP-? Polyuric  - dc IV Diuretics  


e-lytes stable , currently on TPN, strict I/O, avoid nephrotoxins  


 


Anemia-  Currently on YOLI 





Hypernatremia -- DC Bumex , stable Na 





HyPokalemia- replace


2/2 Overdiuresis/Polyuric 





Anasarca due to 3rd spacing , Prolonged Hospitalization   stable  





Hyperkalemia- resolved  





Acute pancreatitis with persistent  necrosis


 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 

the pancreas


   4/27 status post ROBERT drain placement; 





 Cholelithiasis with possible cholecystitis.





Moderate pleural effusions, may be slightly improved. Infiltrate/atelectasis in 

both lung bases.


  


 Ascites - post paracentesis on 4/15-  4300 cc of thin, light brown fluid was 

removed





Acute hypoxic resp failure ,bilateral pleural effusion


  Trach in place , On Vent  


 


HTN 





COVID-19 neg, 3/26





COMMENT/RELEVANT DATA


Meds





Current Medications








 Medications


  (Trade)  Dose


 Ordered  Sig/Yee  Start Time


 Stop Time Status Last Admin


Dose Admin


 


 Acetaminophen


  (Tylenol Supp)  650 mg  PRN Q6HRS  PRN  3/24/20 10:30


    4/27/20 00:32


650 MG


 


 Acetaminophen


  (Tylenol)  650 mg  PRN Q6HRS  PRN  3/21/20 03:36


    4/16/20 19:56


650 MG


 


 Albumin Human  200 ml @ 


 200 mls/hr  1X PRN  PRN  4/21/20 08:00


 4/21/20 13:59 DC 4/21/20 08:40


200 MLS/HR


 


 Albuterol Sulfate


  (Ventolin Neb


 Soln)  2.5 mg  1X  ONCE  3/17/20 22:30


 3/17/20 22:31 DC 3/18/20 00:56


2.5 MG


 


 Alteplase,


 Recombinant


  (Cathflo For


 Central Catheter


 Clearance)  1 mg  1X  ONCE  4/24/20 10:45


 4/24/20 10:46 DC 4/24/20 11:44


1 MG


 


 Amino Acids/


 Glycerin/


 Electrolytes  1,000 ml @ 


 75 mls/hr  [id number]  4/20/20 21:15


   UNV  





 


 Artificial Tears


  (Artificial


 Tears)  1 drop  PRN Q15MIN  PRN  4/29/20 05:30


    5/1/20 12:34


1 DROP


 


 Atenolol


  (Tenormin)  100 mg  DAILY  3/17/20 09:00


 3/16/20 20:08 DC  





 


 Atropine Sulfate


  (ATROPINE 0.5mg


 SYRINGE)  0.5 mg  PRN Q5MIN  PRN  4/2/20 08:15


     





 


 Benzocaine


  (Hurricaine One)  1 spray  1X  ONCE  3/20/20 14:30


 3/20/20 14:31 DC 3/20/20 16:38


1 SPRAY


 


 Bisacodyl


  (Dulcolax Supp)  10 mg  STK-MED ONCE  4/27/20 10:59


 4/27/20 10:59 DC  





 


 Bumetanide


  (Bumex)  2 mg  [id number]  4/28/20 14:00


    5/1/20 13:50


2 MG


 


 Bupivacaine HCl/


 Epinephrine Bitart


  (Sensorcain-Epi


 0.5%-1:057342 Mpf)  30 ml  STK-MED ONCE  4/27/20 10:58


 4/27/20 10:58 DC 4/27/20 12:01


7 ML


 


 Calcium Carbonate/


 Glycine


  (Tums)  500 mg  PRN [id number]  PRN  3/18/20 17:45


     





 


 Calcium Chloride


 1000 mg/Sodium


 Chloride  110 ml @ 


 220 mls/hr  1X  ONCE  3/17/20 22:30


 3/17/20 22:59 DC 3/17/20 22:11


220 MLS/HR


 


 Calcium Chloride


 3000 mg/Sodium


 Chloride  1,030 ml @ 


 50 mls/hr  [id number]  3/19/20 08:00


 3/21/20 15:23 DC 3/21/20 02:17


50 MLS/HR


 


 Calcium Gluconate


  (Calcium


 Gluconate)  2,000 mg  1X  ONCE  3/19/20 02:15


 3/19/20 02:16 DC 3/19/20 02:19


2,000 MG


 


 Calcium Gluconate


 1000 mg/Sodium


 Chloride  110 ml @ 


 220 mls/hr  1X  ONCE  3/18/20 03:30


 3/18/20 03:59 DC 3/18/20 03:21


220 MLS/HR


 


 Calcium Gluconate


 2000 mg/Sodium


 Chloride  120 ml @ 


 220 mls/hr  1X  ONCE  3/18/20 07:30


 3/18/20 08:02 DC 3/18/20 09:05


220 MLS/HR


 


 Cefepime HCl


  (Maxipime)  2 gm  Q12HR  3/25/20 09:00


 4/8/20 09:58 DC 4/7/20 20:56


2 GM


 


 Cellulose


  (Surgicel


 Fibrillar 1x2)  1 each  STK-MED ONCE  4/6/20 11:00


 4/6/20 11:01 DC  





 


 Cellulose


  (Surgicel


 Hemostat 2x14)  1 each  STK-MED ONCE  4/27/20 10:58


 4/27/20 10:59 DC  





 


 Cellulose


  (Surgicel


 Hemostat 4x8)  1 each  STK-MED ONCE  4/27/20 10:58


 4/27/20 10:59 DC  





 


 Cyclobenzaprine


 HCl


  (Flexeril)  10 mg  PRN Q6HRS  PRN  4/30/20 10:45


     





 


 Daptomycin 430 mg/


 Sodium Chloride  50 ml @ 


 100 mls/hr  Q24H  4/25/20 13:00


 4/30/20 20:58 DC 4/30/20 13:00


100 MLS/HR


 


 Daptomycin 500 mg/


 Sodium Chloride  50 ml @ 


 100 mls/hr  Q48H  3/25/20 08:30


 4/10/20 10:07 DC 4/10/20 09:57


100 MLS/HR


 


 Dexamethasone


 Sodium Phosphate


  (Decadron)  4 mg  STK-MED ONCE  4/27/20 10:56


 4/27/20 10:57 DC  





 


 Dexmedetomidine


 HCl 400 mcg/


 Sodium Chloride  100 ml @ 0


 mls/hr  CONT  PRN  4/2/20 08:15


    5/2/20 10:10


16.7 MLS/HR


 


 Dextrose


  (Dextrose


 50%-Water Syringe)  12.5 gm  PRN Q15MIN  PRN  3/16/20 09:30


     





 


 Digoxin


  (Lanoxin)  125 mcg  1X  ONCE  3/19/20 18:00


 3/19/20 18:01 DC 3/19/20 17:10


125 MCG


 


 Diphenhydramine


 HCl


  (Benadryl)  25 mg  1X PRN  PRN  4/24/20 15:45


 4/25/20 15:44 DC  





 


 Enoxaparin Sodium


  (Lovenox 100mg


 Syringe)  100 mg  Q12HR  4/21/20 21:00


   UNV  





 


 Etomidate


  (Amidate)  8 mg  1X  ONCE  3/23/20 08:30


 3/23/20 08:31 DC 3/23/20 08:33


8 MG


 


 Fentanyl Citrate


  (Fentanyl 2ml


 Vial)  100 mcg  STK-MED ONCE  4/27/20 10:56


 4/27/20 10:57 DC  





 


 Furosemide


  (Lasix)  40 mg  1X  ONCE  4/13/20 14:30


 4/13/20 14:31 DC 4/13/20 14:39


40 MG


 


 Heparin Sodium


  (Porcine)


  (Hep Lock Adult)  500 unit  STK-MED ONCE  4/7/20 09:29


 4/7/20 09:30 DC  





 


 Heparin Sodium


  (Porcine)


  (Heparin Sodium)  5,000 unit  Q12HR  4/27/20 21:00


    5/2/20 10:09


5,000 UNIT


 


 Heparin Sodium


  (Porcine) 1000


 unit/Sodium


 Chloride  1,001 ml @ 


 1,001 mls/hr  1X  ONCE  4/27/20 12:00


 4/27/20 12:59 DC  





 


 Hydromorphone HCl


  (Dilaudid


 Standard PCA)  12 mg  STK-MED ONCE  4/30/20 13:47


 5/1/20 11:03 DC  





 


 Hydromorphone HCl


  (Dilaudid)  1 mg  PRN Q3HRS  PRN  3/17/20 12:00


 3/31/20 00:25 DC 3/23/20 05:13


1 MG


 


 Info


  (CONTRAST GIVEN


 -- Rx MONITORING)  1 each  PRN DAILY  PRN  3/30/20 11:45


 4/1/20 11:44 DC  





 


 Info


  (Icu Electrolyte


 Protocol)  1 ea  CONT PRN  PRN  3/29/20 13:15


     





 


 Info


  (PHARMACY


 MONITORING -- do


 not chart)  1 each  PRN DAILY  PRN  4/24/20 15:45


     





 


 Info


  (Tpn Per


 Pharmacy)  1 each  PRN DAILY  PRN  3/18/20 12:30


   UNV  





 


 Insulin Human


 Lispro


  (HumaLOG)  0-9 UNITS  Q6HRS  3/16/20 09:30


    4/30/20 17:29


4 UNITS


 


 Insulin Human


 Regular


  (HumuLIN R VIAL)  5 unit  1X  ONCE  3/17/20 22:30


 3/17/20 22:31 DC 3/17/20 22:14


5 UNIT


 


 Iohexol


  (Omnipaque 240


 Mg/ml)  30 ml  1X  ONCE  3/30/20 11:30


 3/30/20 11:33 DC 3/30/20 11:30


30 ML


 


 Iohexol


  (Omnipaque 300


 Mg/ml)  50 ml  STK-MED ONCE  4/27/20 10:58


 4/27/20 10:59 DC  





 


 Iohexol


  (Omnipaque 350


 Mg/ml)  90 ml  1X  ONCE  3/16/20 03:30


 3/16/20 03:31 DC 3/16/20 03:25


90 ML


 


 Ketorolac


 Tromethamine


  (Toradol 30mg


 Vial)  30 mg  1X  ONCE  3/16/20 03:00


 3/16/20 03:01 DC 3/16/20 02:54


30 MG


 


 Lidocaine HCl


  (Buffered


 Lidocaine 1%)  6 ml  1X  ONCE  4/15/20 13:30


 4/15/20 13:31 DC 4/15/20 13:45


9 ML


 


 Lidocaine HCl


  (Glydo


  (Lidocaine) Jelly)  1 thomas  1X  ONCE  3/20/20 14:30


 3/20/20 14:31 DC 3/20/20 16:38


1 THOMAS


 


 Lidocaine HCl


  (Xylocaine-Mpf


 1% 2ml Vial)  2 ml  PRN 1X  PRN  4/27/20 07:00


 4/28/20 06:59 DC  





 


 Linezolid/Dextrose  300 ml @ 


 300 mls/hr  Q12HR  4/10/20 11:00


 4/21/20 08:10 DC 4/20/20 20:40


300 MLS/HR


 


 Lorazepam


  (Ativan Inj)  1 mg  PRN Q4HRS  PRN  3/19/20 09:00


 4/17/20 09:19 DC 4/17/20 03:51


1 MG


 


 Magnesium Sulfate  50 ml @ 25


 mls/hr  1X  ONCE  5/2/20 10:30


 5/2/20 12:29  5/2/20 10:34


25 MLS/HR


 


 Meropenem 1 gm/


 Sodium Chloride  100 ml @ 


 200 mls/hr  Q8HRS  4/28/20 14:00


    5/2/20 05:42


200 MLS/HR


 


 Meropenem 500 mg/


 Sodium Chloride  50 ml @ 


 100 mls/hr  Q12H  4/8/20 10:00


 4/28/20 12:37 DC 4/28/20 10:45


100 MLS/HR


 


 Metoprolol


 Tartrate


  (Lopressor Vial)  5 mg  Q6HRS  3/17/20 10:15


 3/28/20 08:48 DC 3/26/20 00:12


5 MG


 


 Metronidazole  100 ml @ 


 100 mls/hr  Q8HRS  4/14/20 10:00


 4/21/20 08:10 DC 4/21/20 06:04


100 MLS/HR


 


 Micafungin Sodium


 100 mg/Dextrose  100 ml @ 


 100 mls/hr  Q24H  3/23/20 09:00


 4/30/20 20:58 DC 4/30/20 08:18


100 MLS/HR


 


 Midazolam HCl


  (Versed)  5 mg  1X  ONCE  3/23/20 08:30


 3/23/20 08:31 DC  





 


 Midazolam HCl 100


 mg/Sodium Chloride  100 ml @ 7


 mls/hr  CONT  PRN  3/28/20 16:00


    4/8/20 15:35


7 MLS/HR


 


 Midazolam HCl 50


 mg/Sodium Chloride  50 ml @ 0


 mls/hr  CONT  PRN  3/23/20 08:15


 3/28/20 15:59 DC 3/26/20 22:39


7 MLS/HR


 


 Morphine Sulfate


  (Morphine


 Sulfate)  2 mg  PRN Q2HR  PRN  3/16/20 05:00


 3/17/20 14:15 DC 3/17/20 12:26


2 MG


 


 Multi-Ingred


 Cream/Lotion/Oil/


 Oint


  (Artificial


 Tears Eye


 Ointment)  1 thomas  PRN Q1HR  PRN  3/25/20 17:30


    4/13/20 08:19


1 THOMAS


 


 Naloxone HCl


  (Narcan)  0.4 mg  PRN Q2MIN  PRN  4/27/20 14:30


   UNV  





 


 Norepinephrine


 Bitartrate 8 mg/


 Dextrose  258 ml @ 


 17.299 mls/


 hr  CONT  PRN  3/17/20 15:30


 4/17/20 09:19 DC 4/14/20 12:48


20.9 MLS/HR


 


 Ondansetron HCl


  (Zofran)  4 mg  STK-MED ONCE  4/27/20 10:56


 4/27/20 10:57 DC  





 


 Pantoprazole


 Sodium


  (PROTONIX VIAL


 for IV PUSH)  40 mg  DAILYAC  3/16/20 11:30


    5/2/20 08:20


40 MG


 


 Phenylephrine HCl


  (PHENYLEPHRINE


 in 0.9% NACL PF)  1 mg  STK-MED ONCE  4/27/20 12:34


 4/27/20 12:34 DC  





 


 Piperacillin Sod/


 Tazobactam Sod


 4.5 gm/Sodium


 Chloride  100 ml @ 


 200 mls/hr  1X  ONCE  3/16/20 06:00


 3/16/20 06:29 DC 3/16/20 05:44


200 MLS/HR


 


 Potassium


 Chloride 15 meq/


 Bicarbonate


 Dialysis Soln w/


 out KCl  5,007.5 ml


  @ 1,000 mls/


 hr  Q5H1M  3/29/20 20:00


 4/2/20 13:08 DC 4/1/20 18:14


1,000 MLS/HR


 


 Potassium


 Chloride 20 meq/


 Bicarbonate


 Dialysis Soln w/


 out KCl  5,010 ml @ 


 1,000 mls/hr  Q5H1M  3/25/20 16:00


 3/29/20 19:59 DC 3/29/20 14:54


1,000 MLS/HR


 


 Potassium


 Chloride 75 meq/


 Magnesium Sulfate


 20 meq/Calcium


 Gluconate 10 meq/


 Multivitamins 10


 ml/Chromium/


 Copper/Manganese/


 Seleni/Zn 0.5 ml/


 Insulin Human


 Regular 30 unit/


 Total Parenteral


 Nutrition/Amino


 Acids/Dextrose/


 Fat Emulsion


 Intravenous  1,920 ml @ 


 80 mls/hr  TPN  CONT  5/2/20 22:00


 5/3/20 21:59   





 


 Potassium


 Chloride/Water  100 ml @ 


 100 mls/hr  Q1H  5/2/20 07:00


 5/2/20 10:59 DC 5/2/20 09:59


100 MLS/HR


 


 Potassium


 Phosphate 20 mmol/


 Sodium Chloride  106.6667


 ml @ 


 51.667 m...  1X  ONCE  3/25/20 13:00


 3/25/20 15:03 DC 3/25/20 12:51


51.667 MLS/HR


 


 Potassium Acetate


 30 meq/Magnesium


 Sulfate 20 meq/


 Calcium Gluconate


 10 meq/


 Multivitamins 10


 ml/Chromium/


 Copper/Manganese/


 Seleni/Zn 0.5 ml/


 Insulin Human


 Regular 30 unit/


 Potassium


 Chloride 30 meq/


 Total Parenteral


 Nutrition/Amino


 Acids/Dextrose/


 Fat Emulsion


 Intravenous  1,920 ml @ 


 80 mls/hr  TPN  CONT  5/1/20 22:00


 5/2/20 21:59  5/1/20 22:34


80 MLS/HR


 


 Potassium Acetate


 55 meq/Magnesium


 Sulfate 20 meq/


 Calcium Gluconate


 10 meq/


 Multivitamins 10


 ml/Chromium/


 Copper/Manganese/


 Seleni/Zn 0.5 ml/


 Insulin Human


 Regular 30 unit/


 Total Parenteral


 Nutrition/Amino


 Acids/Dextrose/


 Fat Emulsion


 Intravenous  1,920 ml @ 


 80 mls/hr  TPN  CONT  4/30/20 22:00


 5/1/20 21:59 DC 5/1/20 01:00


80 MLS/HR


 


 Potassium Acetate


 55 meq/Magnesium


 Sulfate 20 meq/


 Calcium Gluconate


 10 meq/


 Multivitamins 10


 ml/Chromium/


 Copper/Manganese/


 Seleni/Zn 0.5 ml/


 Insulin Human


 Regular 35 unit/


 Total Parenteral


 Nutrition/Amino


 Acids/Dextrose/


 Fat Emulsion


 Intravenous  1,920 ml @ 


 80 mls/hr  TPN  CONT  4/28/20 22:00


 4/29/20 21:59 DC 4/28/20 22:02


80 MLS/HR


 


 Potassium Acetate


 65 meq/Magnesium


 Sulfate 20 meq/


 Calcium Gluconate


 10 meq/


 Multivitamins 10


 ml/Chromium/


 Copper/Manganese/


 Seleni/Zn 0.5 ml/


 Insulin Human


 Regular 30 unit/


 Total Parenteral


 Nutrition/Amino


 Acids/Dextrose/


 Fat Emulsion


 Intravenous  1,920 ml @ 


 80 mls/hr  TPN  CONT  4/29/20 22:00


 4/30/20 21:59 DC 4/29/20 22:22


80 MLS/HR


 


 Prochlorperazine


 Edisylate


  (Compazine)  5 mg  PACU PRN  PRN  4/27/20 07:00


 4/28/20 06:59 DC  





 


 Propofol  20 ml @ As


 Directed  STK-MED ONCE  4/27/20 12:26


 4/27/20 12:27 DC  





 


 Ringer's Solution  1,000 ml @ 


 30 mls/hr  Q24H  4/27/20 07:00


 4/27/20 18:59 DC  





 


 Rocuronium Bromide


  (Zemuron)  50 mg  STK-MED ONCE  4/27/20 10:56


 4/27/20 10:57 DC  





 


 Sevoflurane


  (Ultane)  60 ml  STK-MED ONCE  4/27/20 12:26


 4/27/20 12:27 DC  





 


 Sodium


 Bicarbonate 50


 meq/Sodium


 Chloride  1,050 ml @ 


 75 mls/hr  Q14H  3/18/20 07:30


 3/23/20 10:28 DC 3/22/20 21:10


75 MLS/HR


 


 Sodium Acetate 50


 meq/Potassium


 Acetate 55 meq/


 Magnesium Sulfate


 20 meq/Calcium


 Gluconate 10 meq/


 Multivitamins 10


 ml/Chromium/


 Copper/Manganese/


 Seleni/Zn 0.5 ml/


 Insulin Human


 Regular 35 unit/


 Total Parenteral


 Nutrition/Amino


 Acids/Dextrose/


 Fat Emulsion


 Intravenous  1,800 ml @ 


 75 mls/hr  TPN  CONT  4/25/20 22:00


 4/26/20 21:59 DC 4/25/20 22:03


75 MLS/HR


 


 Sodium Chloride  1,000 ml @ 


 25 mls/hr  Q24H  4/27/20 14:30


   UNV  





 


 Sodium Chloride


  (Normal Saline


 Flush)  3 ml  QSHIFT  PRN  4/27/20 13:45


     





 


 Sodium Chloride


 90 meq/Calcium


 Gluconate 10 meq/


 Multivitamins 10


 ml/Chromium/


 Copper/Manganese/


 Seleni/Zn 0.5 ml/


 Total Parenteral


 Nutrition/Amino


 Acids/Dextrose/


 Fat Emulsion


 Intravenous  1,512 ml @ 


 63 mls/hr  TPN  CONT  3/18/20 22:00


 3/19/20 21:59 DC 3/18/20 22:06


63 MLS/HR


 


 Sodium Chloride


 90 meq/Calcium


 Gluconate 10 meq/


 Multivitamins 10


 ml/Chromium/


 Copper/Manganese/


 Seleni/Zn 1 ml/


 Total Parenteral


 Nutrition/Amino


 Acids/Dextrose/


 Fat Emulsion


 Intravenous  55.005 ml


  @ 2.292


 mls/hr  TPN  CONT  3/18/20 22:00


 3/18/20 12:33 DC  





 


 Sodium Chloride


 90 meq/Magnesium


 Sulfate 10 meq/


 Calcium Gluconate


 20 meq/


 Multivitamins 10


 ml/Chromium/


 Copper/Manganese/


 Seleni/Zn 0.5 ml/


 Total Parenteral


 Nutrition/Amino


 Acids/Dextrose/


 Fat Emulsion


 Intravenous  1,512 ml @ 


 63 mls/hr  TPN  CONT  3/19/20 22:00


 3/20/20 21:59 DC 3/19/20 22:25


63 MLS/HR


 


 Sodium Chloride


 90 meq/Magnesium


 Sulfate 12 meq/


 Calcium Gluconate


 15 meq/


 Multivitamins 10


 ml/Chromium/


 Copper/Manganese/


 Seleni/Zn 0.5 ml/


 Insulin Human


 Regular 25 unit/


 Total Parenteral


 Nutrition/Amino


 Acids/Dextrose/


 Fat Emulsion


 Intravenous  1,400 ml @ 


 58.333 mls/


 hr  TPN  CONT  4/8/20 22:00


 4/9/20 21:59 DC 4/8/20 21:41


58.333 MLS/HR


 


 Sodium Chloride


 90 meq/Potassium


 Chloride 15 meq/


 Magnesium Sulfate


 12 meq/Calcium


 Gluconate 15 meq/


 Multivitamins 10


 ml/Chromium/


 Copper/Manganese/


 Seleni/Zn 0.5 ml/


 Insulin Human


 Regular 25 unit/


 Total Parenteral


 Nutrition/Amino


 Acids/Dextrose/


 Fat Emulsion


 Intravenous  1,400 ml @ 


 58.333 mls/


 hr  TPN  CONT  4/7/20 22:00


 4/8/20 21:59 DC 4/7/20 22:13


58.333 MLS/HR


 


 Sodium Chloride


 90 meq/Potassium


 Chloride 15 meq/


 Potassium


 Phosphate 10 mmol/


 Magnesium Sulfate


 8 meq/Calcium


 Gluconate 15 meq/


 Multivitamins 10


 ml/Chromium/


 Copper/Manganese/


 Seleni/Zn 0.5 ml/


 Insulin Human


 Regular 25 unit/


 Total Parenteral


 Nutrition/Amino


 Acids/Dextrose/


 Fat Emulsion


 Intravenous  1,400 ml @ 


 58.333 mls/


 hr  TPN  CONT  4/5/20 22:00


 4/6/20 21:59 DC 4/5/20 21:20


58.333 MLS/HR


 


 Sodium Chloride


 90 meq/Potassium


 Chloride 15 meq/


 Potassium


 Phosphate 10 mmol/


 Magnesium Sulfate


 10 meq/Calcium


 Gluconate 20 meq/


 Multivitamins 10


 ml/Chromium/


 Copper/Manganese/


 Seleni/Zn 0.5 ml/


 Total Parenteral


 Nutrition/Amino


 Acids/Dextrose/


 Fat Emulsion


 Intravenous  1,400 ml @ 


 58.333 mls/


 hr  TPN  CONT  3/23/20 22:00


 3/24/20 21:59 DC 3/23/20 21:42


58.333 MLS/HR


 


 Sodium Chloride


 90 meq/Potassium


 Chloride 15 meq/


 Potassium


 Phosphate 10 mmol/


 Magnesium Sulfate


 12 meq/Calcium


 Gluconate 15 meq/


 Multivitamins 10


 ml/Chromium/


 Copper/Manganese/


 Seleni/Zn 0.5 ml/


 Insulin Human


 Regular 25 unit/


 Total Parenteral


 Nutrition/Amino


 Acids/Dextrose/


 Fat Emulsion


 Intravenous  1,400 ml @ 


 58.333 mls/


 hr  TPN  CONT  4/6/20 22:00


 4/7/20 21:59 DC 4/6/20 22:24


58.333 MLS/HR


 


 Sodium Chloride


 90 meq/Potassium


 Chloride 15 meq/


 Potassium


 Phosphate 15 mmol/


 Magnesium Sulfate


 10 meq/Calcium


 Gluconate 15 meq/


 Multivitamins 10


 ml/Chromium/


 Copper/Manganese/


 Seleni/Zn 0.5 ml/


 Total Parenteral


 Nutrition/Amino


 Acids/Dextrose/


 Fat Emulsion


 Intravenous  1,400 ml @ 


 58.333 mls/


 hr  TPN  CONT  3/24/20 22:00


 3/25/20 21:59 DC 3/24/20 22:17


58.333 MLS/HR


 


 Sodium Chloride


 90 meq/Potassium


 Chloride 15 meq/


 Potassium


 Phosphate 15 mmol/


 Magnesium Sulfate


 10 meq/Calcium


 Gluconate 20 meq/


 Multivitamins 10


 ml/Chromium/


 Copper/Manganese/


 Seleni/Zn 0.5 ml/


 Total Parenteral


 Nutrition/Amino


 Acids/Dextrose/


 Fat Emulsion


 Intravenous  1,200 ml @ 


 50 mls/hr  TPN  CONT  3/22/20 22:00


 3/22/20 14:17 DC  





 


 Sodium Chloride


 90 meq/Potassium


 Chloride 15 meq/


 Potassium


 Phosphate 18 mmol/


 Magnesium Sulfate


 8 meq/Calcium


 Gluconate 15 meq/


 Multivitamins 10


 ml/Chromium/


 Copper/Manganese/


 Seleni/Zn 0.5 ml/


 Insulin Human


 Regular 10 unit/


 Total Parenteral


 Nutrition/Amino


 Acids/Dextrose/


 Fat Emulsion


 Intravenous  1,400 ml @ 


 58.333 mls/


 hr  TPN  CONT  3/27/20 22:00


 3/28/20 21:59 DC 3/27/20 21:43


58.333 MLS/HR


 


 Sodium Chloride


 90 meq/Potassium


 Chloride 15 meq/


 Potassium


 Phosphate 18 mmol/


 Magnesium Sulfate


 8 meq/Calcium


 Gluconate 15 meq/


 Multivitamins 10


 ml/Chromium/


 Copper/Manganese/


 Seleni/Zn 0.5 ml/


 Insulin Human


 Regular 15 unit/


 Total Parenteral


 Nutrition/Amino


 Acids/Dextrose/


 Fat Emulsion


 Intravenous  1,400 ml @ 


 58.333 mls/


 hr  TPN  CONT  3/30/20 22:00


 3/31/20 21:59 DC 3/30/20 21:47


58.333 MLS/HR


 


 Sodium Chloride


 90 meq/Potassium


 Chloride 15 meq/


 Potassium


 Phosphate 18 mmol/


 Magnesium Sulfate


 8 meq/Calcium


 Gluconate 15 meq/


 Multivitamins 10


 ml/Chromium/


 Copper/Manganese/


 Seleni/Zn 0.5 ml/


 Insulin Human


 Regular 20 unit/


 Total Parenteral


 Nutrition/Amino


 Acids/Dextrose/


 Fat Emulsion


 Intravenous  1,400 ml @ 


 58.333 mls/


 hr  TPN  CONT  4/2/20 22:00


 4/3/20 21:59 DC 4/2/20 22:45


58.333 MLS/HR


 


 Sodium Chloride


 90 meq/Potassium


 Chloride 15 meq/


 Potassium


 Phosphate 18 mmol/


 Magnesium Sulfate


 8 meq/Calcium


 Gluconate 15 meq/


 Multivitamins 10


 ml/Chromium/


 Copper/Manganese/


 Seleni/Zn 0.5 ml/


 Total Parenteral


 Nutrition/Amino


 Acids/Dextrose/


 Fat Emulsion


 Intravenous  1,400 ml @ 


 58.333 mls/


 hr  TPN  CONT  3/26/20 22:00


 3/27/20 21:59 DC 3/26/20 22:00


58.333 MLS/HR


 


 Sodium Chloride


 90 meq/Potassium


 Phosphate 15 mmol/


 Magnesium Sulfate


 12 meq/Calcium


 Gluconate 15 meq/


 Multivitamins 10


 ml/Chromium/


 Copper/Manganese/


 Seleni/Zn 0.5 ml/


 Insulin Human


 Regular 30 unit/


 Total Parenteral


 Nutrition/Amino


 Acids/Dextrose/


 Fat Emulsion


 Intravenous  1,400 ml @ 


 58.333 mls/


 hr  TPN  CONT  4/10/20 22:00


 4/11/20 21:59 DC 4/10/20 21:49


58.333 MLS/HR


 


 Sodium Chloride


 90 meq/Potassium


 Phosphate 15 mmol/


 Magnesium Sulfate


 12 meq/Calcium


 Gluconate 15 meq/


 Multivitamins 10


 ml/Chromium/


 Copper/Manganese/


 Seleni/Zn 0.5 ml/


 Insulin Human


 Regular 40 unit/


 Total Parenteral


 Nutrition/Amino


 Acids/Dextrose/


 Fat Emulsion


 Intravenous  1,400 ml @ 


 58.333 mls/


 hr  TPN  CONT  4/11/20 22:00


 4/12/20 21:59 DC 4/11/20 21:21


58.333 MLS/HR


 


 Sodium Chloride


 90 meq/Potassium


 Phosphate 19 mmol/


 Magnesium Sulfate


 12 meq/Calcium


 Gluconate 15 meq/


 Multivitamins 10


 ml/Chromium/


 Copper/Manganese/


 Seleni/Zn 0.5 ml/


 Insulin Human


 Regular 40 unit/


 Total Parenteral


 Nutrition/Amino


 Acids/Dextrose/


 Fat Emulsion


 Intravenous  1,400 ml @ 


 58.333 mls/


 hr  TPN  CONT  4/12/20 22:00


 4/13/20 21:59 DC 4/12/20 21:54


58.333 MLS/HR


 


 Sodium Chloride


 90 meq/Potassium


 Phosphate 5 mmol/


 Magnesium Sulfate


 12 meq/Calcium


 Gluconate 15 meq/


 Multivitamins 10


 ml/Chromium/


 Copper/Manganese/


 Seleni/Zn 0.5 ml/


 Insulin Human


 Regular 30 unit/


 Total Parenteral


 Nutrition/Amino


 Acids/Dextrose/


 Fat Emulsion


 Intravenous  1,400 ml @ 


 58.333 mls/


 hr  TPN  CONT  4/9/20 22:00


 4/10/20 21:59 DC 4/9/20 22:08


58.333 MLS/HR


 


 Sodium Chloride


 100 meq/Potassium


 Chloride 40 meq/


 Magnesium Sulfate


 15 meq/Calcium


 Gluconate 15 meq/


 Multivitamins 10


 ml/Chromium/


 Copper/Manganese/


 Seleni/Zn 0.5 ml/


 Insulin Human


 Regular 35 unit/


 Total Parenteral


 Nutrition/Amino


 Acids/Dextrose/


 Fat Emulsion


 Intravenous  1,400 ml @ 


 58.333 mls/


 hr  TPN  CONT  4/19/20 22:00


 4/20/20 21:59 DC 4/19/20 22:46


58.333 MLS/HR


 


 Sodium Chloride


 100 meq/Potassium


 Chloride 40 meq/


 Magnesium Sulfate


 20 meq/Calcium


 Gluconate 10 meq/


 Multivitamins 10


 ml/Chromium/


 Copper/Manganese/


 Seleni/Zn 0.5 ml/


 Insulin Human


 Regular 35 unit/


 Total Parenteral


 Nutrition/Amino


 Acids/Dextrose/


 Fat Emulsion


 Intravenous  1,400 ml @ 


 58.333 mls/


 hr  TPN  CONT  4/23/20 22:00


 4/24/20 21:59 DC 4/24/20 00:06


58.333 MLS/HR


 


 Sodium Chloride


 100 meq/Potassium


 Chloride 40 meq/


 Magnesium Sulfate


 20 meq/Calcium


 Gluconate 15 meq/


 Multivitamins 10


 ml/Chromium/


 Copper/Manganese/


 Seleni/Zn 0.5 ml/


 Insulin Human


 Regular 35 unit/


 Total Parenteral


 Nutrition/Amino


 Acids/Dextrose/


 Fat Emulsion


 Intravenous  1,400 ml @ 


 58.333 mls/


 hr  TPN  CONT  4/22/20 22:00


 4/23/20 21:59 DC 4/22/20 22:27


58.333 MLS/HR


 


 Sodium Chloride


 100 meq/Potassium


 Phosphate 10 mmol/


 Magnesium Sulfate


 12 meq/Calcium


 Gluconate 15 meq/


 Multivitamins 10


 ml/Chromium/


 Copper/Manganese/


 Seleni/Zn 0.5 ml/


 Insulin Human


 Regular 35 unit/


 Potassium


 Chloride 20 meq/


 Total Parenteral


 Nutrition/Amino


 Acids/Dextrose/


 Fat Emulsion


 Intravenous  1,400 ml @ 


 58.333 mls/


 hr  TPN  CONT  4/16/20 22:00


 4/17/20 21:59 DC 4/16/20 22:10


58.333 MLS/HR


 


 Sodium Chloride


 100 meq/Potassium


 Phosphate 19 mmol/


 Magnesium Sulfate


 12 meq/Calcium


 Gluconate 15 meq/


 Multivitamins 10


 ml/Chromium/


 Copper/Manganese/


 Seleni/Zn 0.5 ml/


 Insulin Human


 Regular 40 unit/


 Potassium


 Chloride 20 meq/


 Total Parenteral


 Nutrition/Amino


 Acids/Dextrose/


 Fat Emulsion


 Intravenous  1,400 ml @ 


 58.333 mls/


 hr  TPN  CONT  4/15/20 22:00


 4/16/20 21:59 DC 4/15/20 21:20


58.333 MLS/HR


 


 Sodium Chloride


 100 meq/Potassium


 Phosphate 5 mmol/


 Magnesium Sulfate


 12 meq/Calcium


 Gluconate 15 meq/


 Multivitamins 10


 ml/Chromium/


 Copper/Manganese/


 Seleni/Zn 0.5 ml/


 Insulin Human


 Regular 35 unit/


 Potassium


 Chloride 20 meq/


 Total Parenteral


 Nutrition/Amino


 Acids/Dextrose/


 Fat Emulsion


 Intravenous  1,400 ml @ 


 58.333 mls/


 hr  TPN  CONT  4/17/20 22:00


 4/18/20 21:59 DC 4/17/20 22:59


58.333 MLS/HR


 


 Succinylcholine


 Chloride


  (Anectine)  120 mg  1X  ONCE  3/23/20 08:30


 3/23/20 08:31 DC 3/23/20 08:34


120 MG








Lab





Laboratory Tests








Test


 5/1/20


12:20 5/1/20


17:31 5/1/20


23:34 5/2/20


05:45


 


Glucose (Fingerstick)


 147 mg/dL


(70-99) 140 mg/dL


(70-99) 134 mg/dL


(70-99) 





 


White Blood Count


 


 


 


 9.7 x10^3/uL


(4.0-11.0)


 


Red Blood Count


 


 


 


 2.46 x10^6/uL


(3.50-5.40)


 


Hemoglobin


 


 


 


 7.2 g/dL


(12.0-15.5)


 


Hematocrit


 


 


 


 22.4 %


(36.0-47.0)


 


Mean Corpuscular Volume    91 fL () 


 


Mean Corpuscular Hemoglobin    29 pg (25-35) 


 


Mean Corpuscular Hemoglobin


Concent 


 


 


 32 g/dL


(31-37)


 


Red Cell Distribution Width


 


 


 


 18.1 %


(11.5-14.5)


 


Platelet Count


 


 


 


 419 x10^3/uL


(140-400)


 


Neutrophils (%) (Auto)    77 % (31-73) 


 


Lymphocytes (%) (Auto)    16 % (24-48) 


 


Monocytes (%) (Auto)    5 % (0-9) 


 


Eosinophils (%) (Auto)    2 % (0-3) 


 


Basophils (%) (Auto)    0 % (0-3) 


 


Neutrophils # (Auto)


 


 


 


 7.4 x10^3/uL


(1.8-7.7)


 


Lymphocytes # (Auto)


 


 


 


 1.5 x10^3/uL


(1.0-4.8)


 


Monocytes # (Auto)


 


 


 


 0.4 x10^3/uL


(0.0-1.1)


 


Eosinophils # (Auto)


 


 


 


 0.2 x10^3/uL


(0.0-0.7)


 


Basophils # (Auto)


 


 


 


 0.0 x10^3/uL


(0.0-0.2)


 


Sodium Level


 


 


 


 154 mmol/L


(136-145)


 


Potassium Level


 


 


 


 2.9 mmol/L


(3.5-5.1)


 


Chloride Level


 


 


 


 111 mmol/L


()


 


Carbon Dioxide Level


 


 


 


 34 mmol/L


(21-32)


 


Anion Gap    9 (6-14) 


 


Blood Urea Nitrogen


 


 


 


 45 mg/dL


(7-20)


 


Creatinine


 


 


 


 1.0 mg/dL


(0.6-1.0)


 


Estimated GFR


(Cockcroft-Gault) 


 


 


 58.9 





 


Glucose Level


 


 


 


 153 mg/dL


(70-99)


 


Calcium Level


 


 


 


 8.6 mg/dL


(8.5-10.1)


 


Phosphorus Level


 


 


 


 3.6 mg/dL


(2.6-4.7)


 


Magnesium Level


 


 


 


 1.8 mg/dL


(1.8-2.4)


 


Test


 5/2/20


05:48 


 


 





 


Glucose (Fingerstick)


 149 mg/dL


(70-99) 


 


 











Results


All relevant outside records, renal labs, imaging studies, telemetry/EKG's were 

reviewed.











KIMBERLY MYERS MD                 May 2, 2020 12:04

## 2020-05-02 NOTE — PDOC
Infectious Disease Note


Subjective:


Subjective


 Pt feels better


Asking for water


Fever pattern improving


on trach with vent


Still has some abdominal discomfort





Vital Signs:


Vital Signs





Vital Signs








  Date Time  Temp Pulse Resp B/P (MAP) Pulse Ox O2 Delivery O2 Flow Rate FiO2


 


5/2/20 10:00  136 32 163/76 (105) 100 Tracheal Collar  


 


5/2/20 08:00 99.9       





 99.9       


 


5/2/20 00:00        











Physical Exam:


PHYSICAL EXAM


GENERAL: Propped up in bed, sedated weak appearing 


HEENT: Pupils equal, + NGT, oral cavity dry 


NECK:  Trach/vent 


LUNGS: rhonchi 


HEART:  S1, S2, regular 


ABDOMEN: Distended, hypoactive BS, drain placement (4/27 )


: [place] (4/14)


EXTREMITIES: Generalized edema, no cyanosis, SCDs bilaterally 


DERMATOLOGIC:  Warm and dry.  No generalized rash.  


CENTRAL NERVOUS SYSTEM: Extremely weak, nods to few simple questions 


PICC line in place April 30, 2020 clean


HDC has been removed


LIJ removed





Medications:


Inpatient Meds:





Current Medications








 Medications


  (Trade)  Dose


 Ordered  Sig/Yee  Start Time


 Stop Time Status Last Admin


Dose Admin


 


 Acetaminophen


  (Tylenol Supp)  650 mg  PRN Q6HRS  PRN  3/24/20 10:30


    4/27/20 00:32


650 MG


 


 Acetaminophen


  (Tylenol)  650 mg  PRN Q6HRS  PRN  3/21/20 03:36


    4/16/20 19:56


650 MG


 


 Albumin Human  200 ml @ 


 200 mls/hr  1X PRN  PRN  4/21/20 08:00


 4/21/20 13:59 DC 4/21/20 08:40


200 MLS/HR


 


 Albuterol Sulfate


  (Ventolin Neb


 Soln)  2.5 mg  1X  ONCE  3/17/20 22:30


 3/17/20 22:31 DC 3/18/20 00:56


2.5 MG


 


 Alteplase,


 Recombinant


  (Cathflo For


 Central Catheter


 Clearance)  1 mg  1X  ONCE  4/24/20 10:45


 4/24/20 10:46 DC 4/24/20 11:44


1 MG


 


 Amino Acids/


 Glycerin/


 Electrolytes  1,000 ml @ 


 75 mls/hr  [id number]  4/20/20 21:15


   UNV  





 


 Artificial Tears


  (Artificial


 Tears)  1 drop  PRN Q15MIN  PRN  4/29/20 05:30


    5/1/20 12:34


1 DROP


 


 Atenolol


  (Tenormin)  100 mg  DAILY  3/17/20 09:00


 3/16/20 20:08 DC  





 


 Atropine Sulfate


  (ATROPINE 0.5mg


 SYRINGE)  0.5 mg  PRN Q5MIN  PRN  4/2/20 08:15


     





 


 Benzocaine


  (Hurricaine One)  1 spray  1X  ONCE  3/20/20 14:30


 3/20/20 14:31 DC 3/20/20 16:38


1 SPRAY


 


 Bisacodyl


  (Dulcolax Supp)  10 mg  STK-MED ONCE  4/27/20 10:59


 4/27/20 10:59 DC  





 


 Bumetanide


  (Bumex)  2 mg  [id number]  4/28/20 14:00


    5/1/20 13:50


2 MG


 


 Bupivacaine HCl/


 Epinephrine Bitart


  (Sensorcain-Epi


 0.5%-1:334016 Mpf)  30 ml  STK-MED ONCE  4/27/20 10:58


 4/27/20 10:58 DC 4/27/20 12:01


7 ML


 


 Calcium Carbonate/


 Glycine


  (Tums)  500 mg  PRN [id number]  PRN  3/18/20 17:45


     





 


 Calcium Chloride


 1000 mg/Sodium


 Chloride  110 ml @ 


 220 mls/hr  1X  ONCE  3/17/20 22:30


 3/17/20 22:59 DC 3/17/20 22:11


220 MLS/HR


 


 Calcium Chloride


 3000 mg/Sodium


 Chloride  1,030 ml @ 


 50 mls/hr  [id number]  3/19/20 08:00


 3/21/20 15:23 DC 3/21/20 02:17


50 MLS/HR


 


 Calcium Gluconate


  (Calcium


 Gluconate)  2,000 mg  1X  ONCE  3/19/20 02:15


 3/19/20 02:16 DC 3/19/20 02:19


2,000 MG


 


 Calcium Gluconate


 1000 mg/Sodium


 Chloride  110 ml @ 


 220 mls/hr  1X  ONCE  3/18/20 03:30


 3/18/20 03:59 DC 3/18/20 03:21


220 MLS/HR


 


 Calcium Gluconate


 2000 mg/Sodium


 Chloride  120 ml @ 


 220 mls/hr  1X  ONCE  3/18/20 07:30


 3/18/20 08:02 DC 3/18/20 09:05


220 MLS/HR


 


 Cefepime HCl


  (Maxipime)  2 gm  Q12HR  3/25/20 09:00


 4/8/20 09:58 DC 4/7/20 20:56


2 GM


 


 Cellulose


  (Surgicel


 Fibrillar 1x2)  1 each  STK-MED ONCE  4/6/20 11:00


 4/6/20 11:01 DC  





 


 Cellulose


  (Surgicel


 Hemostat 2x14)  1 each  STK-MED ONCE  4/27/20 10:58


 4/27/20 10:59 DC  





 


 Cellulose


  (Surgicel


 Hemostat 4x8)  1 each  STK-MED ONCE  4/27/20 10:58


 4/27/20 10:59 DC  





 


 Cyclobenzaprine


 HCl


  (Flexeril)  10 mg  PRN Q6HRS  PRN  4/30/20 10:45


     





 


 Daptomycin 430 mg/


 Sodium Chloride  50 ml @ 


 100 mls/hr  Q24H  4/25/20 13:00


 4/30/20 20:58 DC 4/30/20 13:00


100 MLS/HR


 


 Daptomycin 500 mg/


 Sodium Chloride  50 ml @ 


 100 mls/hr  Q48H  3/25/20 08:30


 4/10/20 10:07 DC 4/10/20 09:57


100 MLS/HR


 


 Dexamethasone


 Sodium Phosphate


  (Decadron)  4 mg  STK-MED ONCE  4/27/20 10:56


 4/27/20 10:57 DC  





 


 Dexmedetomidine


 HCl 400 mcg/


 Sodium Chloride  100 ml @ 0


 mls/hr  CONT  PRN  4/2/20 08:15


    5/2/20 10:10


16.7 MLS/HR


 


 Dextrose


  (Dextrose


 50%-Water Syringe)  12.5 gm  PRN Q15MIN  PRN  3/16/20 09:30


     





 


 Digoxin


  (Lanoxin)  125 mcg  1X  ONCE  3/19/20 18:00


 3/19/20 18:01 DC 3/19/20 17:10


125 MCG


 


 Diphenhydramine


 HCl


  (Benadryl)  25 mg  1X PRN  PRN  4/24/20 15:45


 4/25/20 15:44 DC  





 


 Enoxaparin Sodium


  (Lovenox 100mg


 Syringe)  100 mg  Q12HR  4/21/20 21:00


   UNV  





 


 Etomidate


  (Amidate)  8 mg  1X  ONCE  3/23/20 08:30


 3/23/20 08:31 DC 3/23/20 08:33


8 MG


 


 Fentanyl Citrate


  (Fentanyl 2ml


 Vial)  100 mcg  STK-MED ONCE  4/27/20 10:56


 4/27/20 10:57 DC  





 


 Furosemide


  (Lasix)  40 mg  1X  ONCE  4/13/20 14:30


 4/13/20 14:31 DC 4/13/20 14:39


40 MG


 


 Heparin Sodium


  (Porcine)


  (Hep Lock Adult)  500 unit  STK-MED ONCE  4/7/20 09:29


 4/7/20 09:30 DC  





 


 Heparin Sodium


  (Porcine)


  (Heparin Sodium)  5,000 unit  Q12HR  4/27/20 21:00


    5/2/20 10:09


5,000 UNIT


 


 Heparin Sodium


  (Porcine) 1000


 unit/Sodium


 Chloride  1,001 ml @ 


 1,001 mls/hr  1X  ONCE  4/27/20 12:00


 4/27/20 12:59 DC  





 


 Hydromorphone HCl


  (Dilaudid


 Standard PCA)  12 mg  STK-MED ONCE  4/30/20 13:47


 5/1/20 11:03 DC  





 


 Hydromorphone HCl


  (Dilaudid)  1 mg  PRN Q3HRS  PRN  3/17/20 12:00


 3/31/20 00:25 DC 3/23/20 05:13


1 MG


 


 Info


  (CONTRAST GIVEN


 -- Rx MONITORING)  1 each  PRN DAILY  PRN  3/30/20 11:45


 4/1/20 11:44 DC  





 


 Info


  (Icu Electrolyte


 Protocol)  1 ea  CONT PRN  PRN  3/29/20 13:15


     





 


 Info


  (PHARMACY


 MONITORING -- do


 not chart)  1 each  PRN DAILY  PRN  4/24/20 15:45


     





 


 Info


  (Tpn Per


 Pharmacy)  1 each  PRN DAILY  PRN  3/18/20 12:30


   UNV  





 


 Insulin Human


 Lispro


  (HumaLOG)  0-9 UNITS  Q6HRS  3/16/20 09:30


    4/30/20 17:29


4 UNITS


 


 Insulin Human


 Regular


  (HumuLIN R VIAL)  5 unit  1X  ONCE  3/17/20 22:30


 3/17/20 22:31 DC 3/17/20 22:14


5 UNIT


 


 Iohexol


  (Omnipaque 240


 Mg/ml)  30 ml  1X  ONCE  3/30/20 11:30


 3/30/20 11:33 DC 3/30/20 11:30


30 ML


 


 Iohexol


  (Omnipaque 300


 Mg/ml)  50 ml  STK-MED ONCE  4/27/20 10:58


 4/27/20 10:59 DC  





 


 Iohexol


  (Omnipaque 350


 Mg/ml)  90 ml  1X  ONCE  3/16/20 03:30


 3/16/20 03:31 DC 3/16/20 03:25


90 ML


 


 Ketorolac


 Tromethamine


  (Toradol 30mg


 Vial)  30 mg  1X  ONCE  3/16/20 03:00


 3/16/20 03:01 DC 3/16/20 02:54


30 MG


 


 Lidocaine HCl


  (Buffered


 Lidocaine 1%)  6 ml  1X  ONCE  4/15/20 13:30


 4/15/20 13:31 DC 4/15/20 13:45


9 ML


 


 Lidocaine HCl


  (Glydo


  (Lidocaine) Jelly)  1 thomas  1X  ONCE  3/20/20 14:30


 3/20/20 14:31 DC 3/20/20 16:38


1 THOMAS


 


 Lidocaine HCl


  (Xylocaine-Mpf


 1% 2ml Vial)  2 ml  PRN 1X  PRN  4/27/20 07:00


 4/28/20 06:59 DC  





 


 Linezolid/Dextrose  300 ml @ 


 300 mls/hr  Q12HR  4/10/20 11:00


 4/21/20 08:10 DC 4/20/20 20:40


300 MLS/HR


 


 Lorazepam


  (Ativan Inj)  1 mg  PRN Q4HRS  PRN  3/19/20 09:00


 4/17/20 09:19 DC 4/17/20 03:51


1 MG


 


 Magnesium Sulfate  50 ml @ 25


 mls/hr  1X  ONCE  5/2/20 10:30


 5/2/20 12:29  5/2/20 10:34


25 MLS/HR


 


 Meropenem 1 gm/


 Sodium Chloride  100 ml @ 


 200 mls/hr  Q8HRS  4/28/20 14:00


    5/2/20 05:42


200 MLS/HR


 


 Meropenem 500 mg/


 Sodium Chloride  50 ml @ 


 100 mls/hr  Q12H  4/8/20 10:00


 4/28/20 12:37 DC 4/28/20 10:45


100 MLS/HR


 


 Metoprolol


 Tartrate


  (Lopressor Vial)  5 mg  Q6HRS  3/17/20 10:15


 3/28/20 08:48 DC 3/26/20 00:12


5 MG


 


 Metronidazole  100 ml @ 


 100 mls/hr  Q8HRS  4/14/20 10:00


 4/21/20 08:10 DC 4/21/20 06:04


100 MLS/HR


 


 Micafungin Sodium


 100 mg/Dextrose  100 ml @ 


 100 mls/hr  Q24H  3/23/20 09:00


 4/30/20 20:58 DC 4/30/20 08:18


100 MLS/HR


 


 Midazolam HCl


  (Versed)  5 mg  1X  ONCE  3/23/20 08:30


 3/23/20 08:31 DC  





 


 Midazolam HCl 100


 mg/Sodium Chloride  100 ml @ 7


 mls/hr  CONT  PRN  3/28/20 16:00


    4/8/20 15:35


7 MLS/HR


 


 Midazolam HCl 50


 mg/Sodium Chloride  50 ml @ 0


 mls/hr  CONT  PRN  3/23/20 08:15


 3/28/20 15:59 DC 3/26/20 22:39


7 MLS/HR


 


 Morphine Sulfate


  (Morphine


 Sulfate)  2 mg  PRN Q2HR  PRN  3/16/20 05:00


 3/17/20 14:15 DC 3/17/20 12:26


2 MG


 


 Multi-Ingred


 Cream/Lotion/Oil/


 Oint


  (Artificial


 Tears Eye


 Ointment)  1 thomas  PRN Q1HR  PRN  3/25/20 17:30


    4/13/20 08:19


1 THOMAS


 


 Naloxone HCl


  (Narcan)  0.4 mg  PRN Q2MIN  PRN  4/27/20 14:30


   UNV  





 


 Norepinephrine


 Bitartrate 8 mg/


 Dextrose  258 ml @ 


 17.299 mls/


 hr  CONT  PRN  3/17/20 15:30


 4/17/20 09:19 DC 4/14/20 12:48


20.9 MLS/HR


 


 Ondansetron HCl


  (Zofran)  4 mg  STK-MED ONCE  4/27/20 10:56


 4/27/20 10:57 DC  





 


 Pantoprazole


 Sodium


  (PROTONIX VIAL


 for IV PUSH)  40 mg  DAILYAC  3/16/20 11:30


    5/2/20 08:20


40 MG


 


 Phenylephrine HCl


  (PHENYLEPHRINE


 in 0.9% NACL PF)  1 mg  STK-MED ONCE  4/27/20 12:34


 4/27/20 12:34 DC  





 


 Piperacillin Sod/


 Tazobactam Sod


 4.5 gm/Sodium


 Chloride  100 ml @ 


 200 mls/hr  1X  ONCE  3/16/20 06:00


 3/16/20 06:29 DC 3/16/20 05:44


200 MLS/HR


 


 Potassium


 Chloride 15 meq/


 Bicarbonate


 Dialysis Soln w/


 out KCl  5,007.5 ml


  @ 1,000 mls/


 hr  Q5H1M  3/29/20 20:00


 4/2/20 13:08 DC 4/1/20 18:14


1,000 MLS/HR


 


 Potassium


 Chloride 20 meq/


 Bicarbonate


 Dialysis Soln w/


 out KCl  5,010 ml @ 


 1,000 mls/hr  Q5H1M  3/25/20 16:00


 3/29/20 19:59 DC 3/29/20 14:54


1,000 MLS/HR


 


 Potassium


 Chloride 75 meq/


 Magnesium Sulfate


 20 meq/Calcium


 Gluconate 10 meq/


 Multivitamins 10


 ml/Chromium/


 Copper/Manganese/


 Seleni/Zn 0.5 ml/


 Insulin Human


 Regular 30 unit/


 Total Parenteral


 Nutrition/Amino


 Acids/Dextrose/


 Fat Emulsion


 Intravenous  1,920 ml @ 


 80 mls/hr  TPN  CONT  5/2/20 22:00


 5/3/20 21:59   





 


 Potassium


 Chloride/Water  100 ml @ 


 100 mls/hr  Q1H  5/2/20 07:00


 5/2/20 10:59 DC 5/2/20 09:59


100 MLS/HR


 


 Potassium


 Phosphate 20 mmol/


 Sodium Chloride  106.6667


 ml @ 


 51.667 m...  1X  ONCE  3/25/20 13:00


 3/25/20 15:03 DC 3/25/20 12:51


51.667 MLS/HR


 


 Potassium Acetate


 30 meq/Magnesium


 Sulfate 20 meq/


 Calcium Gluconate


 10 meq/


 Multivitamins 10


 ml/Chromium/


 Copper/Manganese/


 Seleni/Zn 0.5 ml/


 Insulin Human


 Regular 30 unit/


 Potassium


 Chloride 30 meq/


 Total Parenteral


 Nutrition/Amino


 Acids/Dextrose/


 Fat Emulsion


 Intravenous  1,920 ml @ 


 80 mls/hr  TPN  CONT  5/1/20 22:00


 5/2/20 21:59  5/1/20 22:34


80 MLS/HR


 


 Potassium Acetate


 55 meq/Magnesium


 Sulfate 20 meq/


 Calcium Gluconate


 10 meq/


 Multivitamins 10


 ml/Chromium/


 Copper/Manganese/


 Seleni/Zn 0.5 ml/


 Insulin Human


 Regular 30 unit/


 Total Parenteral


 Nutrition/Amino


 Acids/Dextrose/


 Fat Emulsion


 Intravenous  1,920 ml @ 


 80 mls/hr  TPN  CONT  4/30/20 22:00


 5/1/20 21:59 DC 5/1/20 01:00


80 MLS/HR


 


 Potassium Acetate


 55 meq/Magnesium


 Sulfate 20 meq/


 Calcium Gluconate


 10 meq/


 Multivitamins 10


 ml/Chromium/


 Copper/Manganese/


 Seleni/Zn 0.5 ml/


 Insulin Human


 Regular 35 unit/


 Total Parenteral


 Nutrition/Amino


 Acids/Dextrose/


 Fat Emulsion


 Intravenous  1,920 ml @ 


 80 mls/hr  TPN  CONT  4/28/20 22:00


 4/29/20 21:59 DC 4/28/20 22:02


80 MLS/HR


 


 Potassium Acetate


 65 meq/Magnesium


 Sulfate 20 meq/


 Calcium Gluconate


 10 meq/


 Multivitamins 10


 ml/Chromium/


 Copper/Manganese/


 Seleni/Zn 0.5 ml/


 Insulin Human


 Regular 30 unit/


 Total Parenteral


 Nutrition/Amino


 Acids/Dextrose/


 Fat Emulsion


 Intravenous  1,920 ml @ 


 80 mls/hr  TPN  CONT  4/29/20 22:00


 4/30/20 21:59 DC 4/29/20 22:22


80 MLS/HR


 


 Prochlorperazine


 Edisylate


  (Compazine)  5 mg  PACU PRN  PRN  4/27/20 07:00


 4/28/20 06:59 DC  





 


 Propofol  20 ml @ As


 Directed  STK-MED ONCE  4/27/20 12:26


 4/27/20 12:27 DC  





 


 Ringer's Solution  1,000 ml @ 


 30 mls/hr  Q24H  4/27/20 07:00


 4/27/20 18:59 DC  





 


 Rocuronium Bromide


  (Zemuron)  50 mg  STK-MED ONCE  4/27/20 10:56


 4/27/20 10:57 DC  





 


 Sevoflurane


  (Ultane)  60 ml  STK-MED ONCE  4/27/20 12:26


 4/27/20 12:27 DC  





 


 Sodium


 Bicarbonate 50


 meq/Sodium


 Chloride  1,050 ml @ 


 75 mls/hr  Q14H  3/18/20 07:30


 3/23/20 10:28 DC 3/22/20 21:10


75 MLS/HR


 


 Sodium Acetate 50


 meq/Potassium


 Acetate 55 meq/


 Magnesium Sulfate


 20 meq/Calcium


 Gluconate 10 meq/


 Multivitamins 10


 ml/Chromium/


 Copper/Manganese/


 Seleni/Zn 0.5 ml/


 Insulin Human


 Regular 35 unit/


 Total Parenteral


 Nutrition/Amino


 Acids/Dextrose/


 Fat Emulsion


 Intravenous  1,800 ml @ 


 75 mls/hr  TPN  CONT  4/25/20 22:00


 4/26/20 21:59 DC 4/25/20 22:03


75 MLS/HR


 


 Sodium Chloride  1,000 ml @ 


 25 mls/hr  Q24H  4/27/20 14:30


   UNV  





 


 Sodium Chloride


  (Normal Saline


 Flush)  3 ml  QSHIFT  PRN  4/27/20 13:45


     





 


 Sodium Chloride


 90 meq/Calcium


 Gluconate 10 meq/


 Multivitamins 10


 ml/Chromium/


 Copper/Manganese/


 Seleni/Zn 0.5 ml/


 Total Parenteral


 Nutrition/Amino


 Acids/Dextrose/


 Fat Emulsion


 Intravenous  1,512 ml @ 


 63 mls/hr  TPN  CONT  3/18/20 22:00


 3/19/20 21:59 DC 3/18/20 22:06


63 MLS/HR


 


 Sodium Chloride


 90 meq/Calcium


 Gluconate 10 meq/


 Multivitamins 10


 ml/Chromium/


 Copper/Manganese/


 Seleni/Zn 1 ml/


 Total Parenteral


 Nutrition/Amino


 Acids/Dextrose/


 Fat Emulsion


 Intravenous  55.005 ml


  @ 2.292


 mls/hr  TPN  CONT  3/18/20 22:00


 3/18/20 12:33 DC  





 


 Sodium Chloride


 90 meq/Magnesium


 Sulfate 10 meq/


 Calcium Gluconate


 20 meq/


 Multivitamins 10


 ml/Chromium/


 Copper/Manganese/


 Seleni/Zn 0.5 ml/


 Total Parenteral


 Nutrition/Amino


 Acids/Dextrose/


 Fat Emulsion


 Intravenous  1,512 ml @ 


 63 mls/hr  TPN  CONT  3/19/20 22:00


 3/20/20 21:59 DC 3/19/20 22:25


63 MLS/HR


 


 Sodium Chloride


 90 meq/Magnesium


 Sulfate 12 meq/


 Calcium Gluconate


 15 meq/


 Multivitamins 10


 ml/Chromium/


 Copper/Manganese/


 Seleni/Zn 0.5 ml/


 Insulin Human


 Regular 25 unit/


 Total Parenteral


 Nutrition/Amino


 Acids/Dextrose/


 Fat Emulsion


 Intravenous  1,400 ml @ 


 58.333 mls/


 hr  TPN  CONT  4/8/20 22:00


 4/9/20 21:59 DC 4/8/20 21:41


58.333 MLS/HR


 


 Sodium Chloride


 90 meq/Potassium


 Chloride 15 meq/


 Magnesium Sulfate


 12 meq/Calcium


 Gluconate 15 meq/


 Multivitamins 10


 ml/Chromium/


 Copper/Manganese/


 Seleni/Zn 0.5 ml/


 Insulin Human


 Regular 25 unit/


 Total Parenteral


 Nutrition/Amino


 Acids/Dextrose/


 Fat Emulsion


 Intravenous  1,400 ml @ 


 58.333 mls/


 hr  TPN  CONT  4/7/20 22:00


 4/8/20 21:59 DC 4/7/20 22:13


58.333 MLS/HR


 


 Sodium Chloride


 90 meq/Potassium


 Chloride 15 meq/


 Potassium


 Phosphate 10 mmol/


 Magnesium Sulfate


 8 meq/Calcium


 Gluconate 15 meq/


 Multivitamins 10


 ml/Chromium/


 Copper/Manganese/


 Seleni/Zn 0.5 ml/


 Insulin Human


 Regular 25 unit/


 Total Parenteral


 Nutrition/Amino


 Acids/Dextrose/


 Fat Emulsion


 Intravenous  1,400 ml @ 


 58.333 mls/


 hr  TPN  CONT  4/5/20 22:00


 4/6/20 21:59 DC 4/5/20 21:20


58.333 MLS/HR


 


 Sodium Chloride


 90 meq/Potassium


 Chloride 15 meq/


 Potassium


 Phosphate 10 mmol/


 Magnesium Sulfate


 10 meq/Calcium


 Gluconate 20 meq/


 Multivitamins 10


 ml/Chromium/


 Copper/Manganese/


 Seleni/Zn 0.5 ml/


 Total Parenteral


 Nutrition/Amino


 Acids/Dextrose/


 Fat Emulsion


 Intravenous  1,400 ml @ 


 58.333 mls/


 hr  TPN  CONT  3/23/20 22:00


 3/24/20 21:59 DC 3/23/20 21:42


58.333 MLS/HR


 


 Sodium Chloride


 90 meq/Potassium


 Chloride 15 meq/


 Potassium


 Phosphate 10 mmol/


 Magnesium Sulfate


 12 meq/Calcium


 Gluconate 15 meq/


 Multivitamins 10


 ml/Chromium/


 Copper/Manganese/


 Seleni/Zn 0.5 ml/


 Insulin Human


 Regular 25 unit/


 Total Parenteral


 Nutrition/Amino


 Acids/Dextrose/


 Fat Emulsion


 Intravenous  1,400 ml @ 


 58.333 mls/


 hr  TPN  CONT  4/6/20 22:00


 4/7/20 21:59 DC 4/6/20 22:24


58.333 MLS/HR


 


 Sodium Chloride


 90 meq/Potassium


 Chloride 15 meq/


 Potassium


 Phosphate 15 mmol/


 Magnesium Sulfate


 10 meq/Calcium


 Gluconate 15 meq/


 Multivitamins 10


 ml/Chromium/


 Copper/Manganese/


 Seleni/Zn 0.5 ml/


 Total Parenteral


 Nutrition/Amino


 Acids/Dextrose/


 Fat Emulsion


 Intravenous  1,400 ml @ 


 58.333 mls/


 hr  TPN  CONT  3/24/20 22:00


 3/25/20 21:59 DC 3/24/20 22:17


58.333 MLS/HR


 


 Sodium Chloride


 90 meq/Potassium


 Chloride 15 meq/


 Potassium


 Phosphate 15 mmol/


 Magnesium Sulfate


 10 meq/Calcium


 Gluconate 20 meq/


 Multivitamins 10


 ml/Chromium/


 Copper/Manganese/


 Seleni/Zn 0.5 ml/


 Total Parenteral


 Nutrition/Amino


 Acids/Dextrose/


 Fat Emulsion


 Intravenous  1,200 ml @ 


 50 mls/hr  TPN  CONT  3/22/20 22:00


 3/22/20 14:17 DC  





 


 Sodium Chloride


 90 meq/Potassium


 Chloride 15 meq/


 Potassium


 Phosphate 18 mmol/


 Magnesium Sulfate


 8 meq/Calcium


 Gluconate 15 meq/


 Multivitamins 10


 ml/Chromium/


 Copper/Manganese/


 Seleni/Zn 0.5 ml/


 Insulin Human


 Regular 10 unit/


 Total Parenteral


 Nutrition/Amino


 Acids/Dextrose/


 Fat Emulsion


 Intravenous  1,400 ml @ 


 58.333 mls/


 hr  TPN  CONT  3/27/20 22:00


 3/28/20 21:59 DC 3/27/20 21:43


58.333 MLS/HR


 


 Sodium Chloride


 90 meq/Potassium


 Chloride 15 meq/


 Potassium


 Phosphate 18 mmol/


 Magnesium Sulfate


 8 meq/Calcium


 Gluconate 15 meq/


 Multivitamins 10


 ml/Chromium/


 Copper/Manganese/


 Seleni/Zn 0.5 ml/


 Insulin Human


 Regular 15 unit/


 Total Parenteral


 Nutrition/Amino


 Acids/Dextrose/


 Fat Emulsion


 Intravenous  1,400 ml @ 


 58.333 mls/


 hr  TPN  CONT  3/30/20 22:00


 3/31/20 21:59 DC 3/30/20 21:47


58.333 MLS/HR


 


 Sodium Chloride


 90 meq/Potassium


 Chloride 15 meq/


 Potassium


 Phosphate 18 mmol/


 Magnesium Sulfate


 8 meq/Calcium


 Gluconate 15 meq/


 Multivitamins 10


 ml/Chromium/


 Copper/Manganese/


 Seleni/Zn 0.5 ml/


 Insulin Human


 Regular 20 unit/


 Total Parenteral


 Nutrition/Amino


 Acids/Dextrose/


 Fat Emulsion


 Intravenous  1,400 ml @ 


 58.333 mls/


 hr  TPN  CONT  4/2/20 22:00


 4/3/20 21:59 DC 4/2/20 22:45


58.333 MLS/HR


 


 Sodium Chloride


 90 meq/Potassium


 Chloride 15 meq/


 Potassium


 Phosphate 18 mmol/


 Magnesium Sulfate


 8 meq/Calcium


 Gluconate 15 meq/


 Multivitamins 10


 ml/Chromium/


 Copper/Manganese/


 Seleni/Zn 0.5 ml/


 Total Parenteral


 Nutrition/Amino


 Acids/Dextrose/


 Fat Emulsion


 Intravenous  1,400 ml @ 


 58.333 mls/


 hr  TPN  CONT  3/26/20 22:00


 3/27/20 21:59 DC 3/26/20 22:00


58.333 MLS/HR


 


 Sodium Chloride


 90 meq/Potassium


 Phosphate 15 mmol/


 Magnesium Sulfate


 12 meq/Calcium


 Gluconate 15 meq/


 Multivitamins 10


 ml/Chromium/


 Copper/Manganese/


 Seleni/Zn 0.5 ml/


 Insulin Human


 Regular 30 unit/


 Total Parenteral


 Nutrition/Amino


 Acids/Dextrose/


 Fat Emulsion


 Intravenous  1,400 ml @ 


 58.333 mls/


 hr  TPN  CONT  4/10/20 22:00


 4/11/20 21:59 DC 4/10/20 21:49


58.333 MLS/HR


 


 Sodium Chloride


 90 meq/Potassium


 Phosphate 15 mmol/


 Magnesium Sulfate


 12 meq/Calcium


 Gluconate 15 meq/


 Multivitamins 10


 ml/Chromium/


 Copper/Manganese/


 Seleni/Zn 0.5 ml/


 Insulin Human


 Regular 40 unit/


 Total Parenteral


 Nutrition/Amino


 Acids/Dextrose/


 Fat Emulsion


 Intravenous  1,400 ml @ 


 58.333 mls/


 hr  TPN  CONT  4/11/20 22:00


 4/12/20 21:59 DC 4/11/20 21:21


58.333 MLS/HR


 


 Sodium Chloride


 90 meq/Potassium


 Phosphate 19 mmol/


 Magnesium Sulfate


 12 meq/Calcium


 Gluconate 15 meq/


 Multivitamins 10


 ml/Chromium/


 Copper/Manganese/


 Seleni/Zn 0.5 ml/


 Insulin Human


 Regular 40 unit/


 Total Parenteral


 Nutrition/Amino


 Acids/Dextrose/


 Fat Emulsion


 Intravenous  1,400 ml @ 


 58.333 mls/


 hr  TPN  CONT  4/12/20 22:00


 4/13/20 21:59 DC 4/12/20 21:54


58.333 MLS/HR


 


 Sodium Chloride


 90 meq/Potassium


 Phosphate 5 mmol/


 Magnesium Sulfate


 12 meq/Calcium


 Gluconate 15 meq/


 Multivitamins 10


 ml/Chromium/


 Copper/Manganese/


 Seleni/Zn 0.5 ml/


 Insulin Human


 Regular 30 unit/


 Total Parenteral


 Nutrition/Amino


 Acids/Dextrose/


 Fat Emulsion


 Intravenous  1,400 ml @ 


 58.333 mls/


 hr  TPN  CONT  4/9/20 22:00


 4/10/20 21:59 DC 4/9/20 22:08


58.333 MLS/HR


 


 Sodium Chloride


 100 meq/Potassium


 Chloride 40 meq/


 Magnesium Sulfate


 15 meq/Calcium


 Gluconate 15 meq/


 Multivitamins 10


 ml/Chromium/


 Copper/Manganese/


 Seleni/Zn 0.5 ml/


 Insulin Human


 Regular 35 unit/


 Total Parenteral


 Nutrition/Amino


 Acids/Dextrose/


 Fat Emulsion


 Intravenous  1,400 ml @ 


 58.333 mls/


 hr  TPN  CONT  4/19/20 22:00


 4/20/20 21:59 DC 4/19/20 22:46


58.333 MLS/HR


 


 Sodium Chloride


 100 meq/Potassium


 Chloride 40 meq/


 Magnesium Sulfate


 20 meq/Calcium


 Gluconate 10 meq/


 Multivitamins 10


 ml/Chromium/


 Copper/Manganese/


 Seleni/Zn 0.5 ml/


 Insulin Human


 Regular 35 unit/


 Total Parenteral


 Nutrition/Amino


 Acids/Dextrose/


 Fat Emulsion


 Intravenous  1,400 ml @ 


 58.333 mls/


 hr  TPN  CONT  4/23/20 22:00


 4/24/20 21:59 DC 4/24/20 00:06


58.333 MLS/HR


 


 Sodium Chloride


 100 meq/Potassium


 Chloride 40 meq/


 Magnesium Sulfate


 20 meq/Calcium


 Gluconate 15 meq/


 Multivitamins 10


 ml/Chromium/


 Copper/Manganese/


 Seleni/Zn 0.5 ml/


 Insulin Human


 Regular 35 unit/


 Total Parenteral


 Nutrition/Amino


 Acids/Dextrose/


 Fat Emulsion


 Intravenous  1,400 ml @ 


 58.333 mls/


 hr  TPN  CONT  4/22/20 22:00


 4/23/20 21:59 DC 4/22/20 22:27


58.333 MLS/HR


 


 Sodium Chloride


 100 meq/Potassium


 Phosphate 10 mmol/


 Magnesium Sulfate


 12 meq/Calcium


 Gluconate 15 meq/


 Multivitamins 10


 ml/Chromium/


 Copper/Manganese/


 Seleni/Zn 0.5 ml/


 Insulin Human


 Regular 35 unit/


 Potassium


 Chloride 20 meq/


 Total Parenteral


 Nutrition/Amino


 Acids/Dextrose/


 Fat Emulsion


 Intravenous  1,400 ml @ 


 58.333 mls/


 hr  TPN  CONT  4/16/20 22:00


 4/17/20 21:59 DC 4/16/20 22:10


58.333 MLS/HR


 


 Sodium Chloride


 100 meq/Potassium


 Phosphate 19 mmol/


 Magnesium Sulfate


 12 meq/Calcium


 Gluconate 15 meq/


 Multivitamins 10


 ml/Chromium/


 Copper/Manganese/


 Seleni/Zn 0.5 ml/


 Insulin Human


 Regular 40 unit/


 Potassium


 Chloride 20 meq/


 Total Parenteral


 Nutrition/Amino


 Acids/Dextrose/


 Fat Emulsion


 Intravenous  1,400 ml @ 


 58.333 mls/


 hr  TPN  CONT  4/15/20 22:00


 4/16/20 21:59 DC 4/15/20 21:20


58.333 MLS/HR


 


 Sodium Chloride


 100 meq/Potassium


 Phosphate 5 mmol/


 Magnesium Sulfate


 12 meq/Calcium


 Gluconate 15 meq/


 Multivitamins 10


 ml/Chromium/


 Copper/Manganese/


 Seleni/Zn 0.5 ml/


 Insulin Human


 Regular 35 unit/


 Potassium


 Chloride 20 meq/


 Total Parenteral


 Nutrition/Amino


 Acids/Dextrose/


 Fat Emulsion


 Intravenous  1,400 ml @ 


 58.333 mls/


 hr  TPN  CONT  4/17/20 22:00


 4/18/20 21:59 DC 4/17/20 22:59


58.333 MLS/HR


 


 Succinylcholine


 Chloride


  (Anectine)  120 mg  1X  ONCE  3/23/20 08:30


 3/23/20 08:31 DC 3/23/20 08:34


120 MG











Labs:


Lab





Laboratory Tests








Test


 5/1/20


12:20 5/1/20


17:31 5/1/20


23:34 5/2/20


05:45


 


Glucose (Fingerstick)


 147 mg/dL


(70-99) 140 mg/dL


(70-99) 134 mg/dL


(70-99) 





 


White Blood Count


 


 


 


 9.7 x10^3/uL


(4.0-11.0)


 


Red Blood Count


 


 


 


 2.46 x10^6/uL


(3.50-5.40)


 


Hemoglobin


 


 


 


 7.2 g/dL


(12.0-15.5)


 


Hematocrit


 


 


 


 22.4 %


(36.0-47.0)


 


Mean Corpuscular Volume    91 fL () 


 


Mean Corpuscular Hemoglobin    29 pg (25-35) 


 


Mean Corpuscular Hemoglobin


Concent 


 


 


 32 g/dL


(31-37)


 


Red Cell Distribution Width


 


 


 


 18.1 %


(11.5-14.5)


 


Platelet Count


 


 


 


 419 x10^3/uL


(140-400)


 


Neutrophils (%) (Auto)    77 % (31-73) 


 


Lymphocytes (%) (Auto)    16 % (24-48) 


 


Monocytes (%) (Auto)    5 % (0-9) 


 


Eosinophils (%) (Auto)    2 % (0-3) 


 


Basophils (%) (Auto)    0 % (0-3) 


 


Neutrophils # (Auto)


 


 


 


 7.4 x10^3/uL


(1.8-7.7)


 


Lymphocytes # (Auto)


 


 


 


 1.5 x10^3/uL


(1.0-4.8)


 


Monocytes # (Auto)


 


 


 


 0.4 x10^3/uL


(0.0-1.1)


 


Eosinophils # (Auto)


 


 


 


 0.2 x10^3/uL


(0.0-0.7)


 


Basophils # (Auto)


 


 


 


 0.0 x10^3/uL


(0.0-0.2)


 


Sodium Level


 


 


 


 154 mmol/L


(136-145)


 


Potassium Level


 


 


 


 2.9 mmol/L


(3.5-5.1)


 


Chloride Level


 


 


 


 111 mmol/L


()


 


Carbon Dioxide Level


 


 


 


 34 mmol/L


(21-32)


 


Anion Gap    9 (6-14) 


 


Blood Urea Nitrogen


 


 


 


 45 mg/dL


(7-20)


 


Creatinine


 


 


 


 1.0 mg/dL


(0.6-1.0)


 


Estimated GFR


(Cockcroft-Gault) 


 


 


 58.9 





 


Glucose Level


 


 


 


 153 mg/dL


(70-99)


 


Calcium Level


 


 


 


 8.6 mg/dL


(8.5-10.1)


 


Phosphorus Level


 


 


 


 3.6 mg/dL


(2.6-4.7)


 


Magnesium Level


 


 


 


 1.8 mg/dL


(1.8-2.4)


 


Test


 5/2/20


05:48 


 


 





 


Glucose (Fingerstick)


 149 mg/dL


(70-99) 


 


 














Objective:


Assessment:


Fever intermittent could be from underlying pancreatitis


Acute pancreatitis with persistent  necrosis


CT a/p 4/9


    Increased ascites. Persistent evidence of necrotizing pancreatitis with 

fluid and phlegmon


   at the pancreas


   4/27 status post ROBERT drain placement; 


Cholelithiasis with thickening of the gallbladder wall.


Leucocytosis improving


JED,Hyperkalemia, Metabolic acidosis off dialysis


Acute hypoxic resp failure ,bilateral pleural effusion and atelectasis


hypocalcemia 


Prediabetes


HTN


s/p trach





Plan:


Plan of Care





cont merrem (4/8), 


off micafungin and daptomycin 4/30


Central line has been removed


Follow-up cultures and lab


Monitor for abx toxicities. 


Maintain aspiration precautions 


PT and OT as tolerated











IVAN FRANZ MD            May 2, 2020 11:48

## 2020-05-02 NOTE — PDOC
PROGRESS NOTES


Chief Complaint


Chief Complaint


Acute hypoxic Respiratory failure requiring mechanical ventilation (on vent 

since 3/23)


Tracheostomy


bilateral pleural effusions/pulm edema 


Sepsis


Severe Acute gallstone pancreatitis (not a surgical candidate at this time) with

necrosis


Acute kidney failure now requiring dialysis


Salpingitis


Gallstones (Calculus of gallbladder with acute cholecystitis without 

obstruction)


HTN 


Leukocytosis 


Hypoxia


Uterine fibroid


Intractable pain


Intractable nausea


Covid 19 negative. 


Acute on chronic anemia 


EEG: No seizure activity


ESRD on HD


Hyperglycemia





History of Present Illness


History of Present Illness


Ms Tadeo is a 50yo F w/ PMHx HTN, prediabetes who presented to the emergency 

room with complaints of abdominal pain on 3/16/2020. Found with Lipase 40139, 

, , Bilirubin 1.4.


CT abdomen confirms pancreatic inflammation, peripancreatic fluid and 

inflammatory changes around the pancreas consistent with pancreatitis. 

Cholelithiasis and 1.4cm uterine fibroid as well as possible left salpingitis. 

Admitted for further care


GI, General surgery, ID, Pulm consulted.





3/17: PICC placed per IR. Renal US negative. Started on levophed. Repeat CT 

abdomen w/ necrosis; 3/18: Dialysis catheter per nephrology; 3/19: On BiPAP; 

3/20: BiPAP, dialysis; 3/21: Overnight Tmax 101.7 , still on BiPAP FiO2 40%, 

still on low dose Levophed gtt, TPN initiated. On dialysis


4/6: Tracheostomy; 4/12: S/p tracheostomy on vent spontaneous respirations with 

5 of pressure support 35% FiO2, rectal tube and a [place], off pressors; 

4/14:Still on vent via trach. Removed PICC and CVC LIJ and replaced. CT 

chest/abd/pelvis with bilateral pleural effusion and ascites.


4/15: Renal function stable. Still on vent. More interactive today. Miming wish 

for food. Plan discussed for thoracentesis/paracentesis with daughters today. 

They were under impression patient was doing worse due to a miscommunication 

which has been clarified over the phone. 4.3L removed.


4/17: Febrile overnight 101.8F. More interactive, still on vent. Asking for ice 

by miming; 4/18: Afebrile overnight. TMax last 24 hours 100.6F. Hb 7.1. 

Interactive when awake. 4/22: Transfusion 1u PRBC (6U total since admit)


4/23-4/26: TPN and precedex, vent.


4/27: Tmax 101F overnight. Hb 8.2. HD cath out since 4/24. Alert. On vent SIMV 

35% FiO2. Surgery: ex-lap, no naif or pancreatic necrosectomy 2/2 profound 

inflammation.


4/28: Seen POD #1. Afebrile overnight. BUN 62. CBC WNL today.Remains on vent via

trach, TPN. Able to point today and indicate she wishes her daughters and Rolf 

to be involved in her care.


4/29: Hb 7.4, Na 151. Remains on vent via trach, TPN. off HD for now. Not 

tolerating trach shield well.


4/30: Negative US for UE DVT. More relaxed today. Has some increase in UOP. 

Tolerating vent well. She is requesting to eat and drink via writing. T max 100F

24 hours ago, but 101F at 1200. Right PICC placed. Speaking valve for half the 

day in [facility]


5/1: Na 153 today. Good UOP. Tmax 101F at 1200 on 4/30. She becomes a bit 

anxious and did not sleep much last night, wishes to try to sleep this morning





Able to speak well with speaking valve today. Na 153, K2.9, Mg 1.8, Cr 1. 100.4F

axillary temp overnight. Asking for food.





Plan:


Cont vent weaning


Trach shield and speaking valve during day when awake. Would recommend ABG after

4 hours to r/o CO2 retention, however and still vent overnight


Monitor fever curve, no obvious source of infection, possibly some aspiration 

events, atelectasis





Vitals


Vitals





Vital Signs








  Date Time  Temp Pulse Resp B/P (MAP) Pulse Ox O2 Delivery O2 Flow Rate FiO2


 


5/2/20 07:33     97 Ventilator  


 


5/2/20 06:00  86 25 104/52 (69)    


 


5/2/20 04:00 99.6       





 99.6       


 


5/2/20 00:00        











Physical Exam


Physical Exam


GENERAL: Propped up in bed, sedated weak appearing 


HEENT: Pupils equal, + NGT, oral cavity dry 


NECK:  Trach/vent 


LUNGS: rhonchi 


HEART:  S1, S2, regular 


ABDOMEN: Distended, hypoactive BS, drain placement (4/27 )


: Chino (4/14)


EXTREMITIES: Generalized edema, no cyanosis, SCDs bilaterally 


DERMATOLOGIC:  Warm and dry.  No generalized rash.  


CENTRAL NERVOUS SYSTEM: Extremely weak, nods to few simple questions 


PICC line in place April 30, 2020 clean


HDC has been removed


LIJ removed


General:  Alert, Cooperative, mild distress


Heart:  Regular rate, No murmurs


Lungs:  Crackles


Abdomen:  Normal bowel sounds, Soft, Other (Mildly tender in epigastrium no 

peritoneal signs)


Extremities:  No edema


Skin:  Other (mottling noted to extremities )





Labs


LABS





Laboratory Tests








Test


 5/1/20


12:20 5/1/20


17:31 5/1/20


23:34 5/2/20


05:45


 


Glucose (Fingerstick)


 147 mg/dL


(70-99) 140 mg/dL


(70-99) 134 mg/dL


(70-99) 





 


White Blood Count


 


 


 


 9.7 x10^3/uL


(4.0-11.0)


 


Red Blood Count


 


 


 


 2.46 x10^6/uL


(3.50-5.40)


 


Hemoglobin


 


 


 


 7.2 g/dL


(12.0-15.5)


 


Hematocrit


 


 


 


 22.4 %


(36.0-47.0)


 


Mean Corpuscular Volume    91 fL () 


 


Mean Corpuscular Hemoglobin    29 pg (25-35) 


 


Mean Corpuscular Hemoglobin


Concent 


 


 


 32 g/dL


(31-37)


 


Red Cell Distribution Width


 


 


 


 18.1 %


(11.5-14.5)


 


Platelet Count


 


 


 


 419 x10^3/uL


(140-400)


 


Neutrophils (%) (Auto)    77 % (31-73) 


 


Lymphocytes (%) (Auto)    16 % (24-48) 


 


Monocytes (%) (Auto)    5 % (0-9) 


 


Eosinophils (%) (Auto)    2 % (0-3) 


 


Basophils (%) (Auto)    0 % (0-3) 


 


Neutrophils # (Auto)


 


 


 


 7.4 x10^3/uL


(1.8-7.7)


 


Lymphocytes # (Auto)


 


 


 


 1.5 x10^3/uL


(1.0-4.8)


 


Monocytes # (Auto)


 


 


 


 0.4 x10^3/uL


(0.0-1.1)


 


Eosinophils # (Auto)


 


 


 


 0.2 x10^3/uL


(0.0-0.7)


 


Basophils # (Auto)


 


 


 


 0.0 x10^3/uL


(0.0-0.2)


 


Sodium Level


 


 


 


 154 mmol/L


(136-145)


 


Potassium Level


 


 


 


 2.9 mmol/L


(3.5-5.1)


 


Chloride Level


 


 


 


 111 mmol/L


()


 


Carbon Dioxide Level


 


 


 


 34 mmol/L


(21-32)


 


Anion Gap    9 (6-14) 


 


Blood Urea Nitrogen


 


 


 


 45 mg/dL


(7-20)


 


Creatinine


 


 


 


 1.0 mg/dL


(0.6-1.0)


 


Estimated GFR


(Cockcroft-Gault) 


 


 


 58.9 





 


Glucose Level


 


 


 


 153 mg/dL


(70-99)


 


Calcium Level


 


 


 


 8.6 mg/dL


(8.5-10.1)


 


Phosphorus Level


 


 


 


 3.6 mg/dL


(2.6-4.7)


 


Magnesium Level


 


 


 


 1.8 mg/dL


(1.8-2.4)


 


Test


 5/2/20


05:48 


 


 





 


Glucose (Fingerstick)


 149 mg/dL


(70-99) 


 


 














Assessment and Plan


Assessmemt and Plan


Problems


Medical Problems:


(1) Acute pancreatitis


Status: Acute  





(2) Cholelithiasis


Status: Acute  











Comment


Review of Relevant


I have reviewed the following items josy (where applicable) has been applied.


Labs





Laboratory Tests








Test


 4/30/20


12:10 4/30/20


17:26 5/1/20


00:59 5/1/20


06:27


 


Glucose (Fingerstick)


 169 mg/dL


(70-99) 152 mg/dL


(70-99) 152 mg/dL


(70-99) 139 mg/dL


(70-99)


 


Test


 5/1/20


06:30 5/1/20


12:20 5/1/20


17:31 5/1/20


23:34


 


White Blood Count


 14.7 x10^3/uL


(4.0-11.0) 


 


 





 


Red Blood Count


 2.78 x10^6/uL


(3.50-5.40) 


 


 





 


Hemoglobin


 8.1 g/dL


(12.0-15.5) 


 


 





 


Hematocrit


 25.3 %


(36.0-47.0) 


 


 





 


Mean Corpuscular Volume 91 fL ()    


 


Mean Corpuscular Hemoglobin 29 pg (25-35)    


 


Mean Corpuscular Hemoglobin


Concent 32 g/dL


(31-37) 


 


 





 


Red Cell Distribution Width


 18.7 %


(11.5-14.5) 


 


 





 


Platelet Count


 442 x10^3/uL


(140-400) 


 


 





 


Neutrophils (%) (Auto) 80 % (31-73)    


 


Lymphocytes (%) (Auto) 15 % (24-48)    


 


Monocytes (%) (Auto) 4 % (0-9)    


 


Eosinophils (%) (Auto) 1 % (0-3)    


 


Basophils (%) (Auto) 0 % (0-3)    


 


Neutrophils # (Auto)


 11.7 x10^3/uL


(1.8-7.7) 


 


 





 


Lymphocytes # (Auto)


 2.2 x10^3/uL


(1.0-4.8) 


 


 





 


Monocytes # (Auto)


 0.5 x10^3/uL


(0.0-1.1) 


 


 





 


Eosinophils # (Auto)


 0.1 x10^3/uL


(0.0-0.7) 


 


 





 


Basophils # (Auto)


 0.0 x10^3/uL


(0.0-0.2) 


 


 





 


Sodium Level


 153 mmol/L


(136-145) 


 


 





 


Potassium Level


 3.3 mmol/L


(3.5-5.1) 


 


 





 


Chloride Level


 111 mmol/L


() 


 


 





 


Carbon Dioxide Level


 33 mmol/L


(21-32) 


 


 





 


Anion Gap 9 (6-14)    


 


Blood Urea Nitrogen


 47 mg/dL


(7-20) 


 


 





 


Creatinine


 0.9 mg/dL


(0.6-1.0) 


 


 





 


Estimated GFR


(Cockcroft-Gault) 66.5 


 


 


 





 


Glucose Level


 150 mg/dL


(70-99) 


 


 





 


Calcium Level


 8.7 mg/dL


(8.5-10.1) 


 


 





 


Phosphorus Level


 4.6 mg/dL


(2.6-4.7) 


 


 





 


Magnesium Level


 1.8 mg/dL


(1.8-2.4) 


 


 





 


Glucose (Fingerstick)


 


 147 mg/dL


(70-99) 140 mg/dL


(70-99) 134 mg/dL


(70-99)


 


Test


 5/2/20


05:45 5/2/20


05:48 


 





 


White Blood Count


 9.7 x10^3/uL


(4.0-11.0) 


 


 





 


Red Blood Count


 2.46 x10^6/uL


(3.50-5.40) 


 


 





 


Hemoglobin


 7.2 g/dL


(12.0-15.5) 


 


 





 


Hematocrit


 22.4 %


(36.0-47.0) 


 


 





 


Mean Corpuscular Volume 91 fL ()    


 


Mean Corpuscular Hemoglobin 29 pg (25-35)    


 


Mean Corpuscular Hemoglobin


Concent 32 g/dL


(31-37) 


 


 





 


Red Cell Distribution Width


 18.1 %


(11.5-14.5) 


 


 





 


Platelet Count


 419 x10^3/uL


(140-400) 


 


 





 


Neutrophils (%) (Auto) 77 % (31-73)    


 


Lymphocytes (%) (Auto) 16 % (24-48)    


 


Monocytes (%) (Auto) 5 % (0-9)    


 


Eosinophils (%) (Auto) 2 % (0-3)    


 


Basophils (%) (Auto) 0 % (0-3)    


 


Neutrophils # (Auto)


 7.4 x10^3/uL


(1.8-7.7) 


 


 





 


Lymphocytes # (Auto)


 1.5 x10^3/uL


(1.0-4.8) 


 


 





 


Monocytes # (Auto)


 0.4 x10^3/uL


(0.0-1.1) 


 


 





 


Eosinophils # (Auto)


 0.2 x10^3/uL


(0.0-0.7) 


 


 





 


Basophils # (Auto)


 0.0 x10^3/uL


(0.0-0.2) 


 


 





 


Sodium Level


 154 mmol/L


(136-145) 


 


 





 


Potassium Level


 2.9 mmol/L


(3.5-5.1) 


 


 





 


Chloride Level


 111 mmol/L


() 


 


 





 


Carbon Dioxide Level


 34 mmol/L


(21-32) 


 


 





 


Anion Gap 9 (6-14)    


 


Blood Urea Nitrogen


 45 mg/dL


(7-20) 


 


 





 


Creatinine


 1.0 mg/dL


(0.6-1.0) 


 


 





 


Estimated GFR


(Cockcroft-Gault) 58.9 


 


 


 





 


Glucose Level


 153 mg/dL


(70-99) 


 


 





 


Calcium Level


 8.6 mg/dL


(8.5-10.1) 


 


 





 


Phosphorus Level


 3.6 mg/dL


(2.6-4.7) 


 


 





 


Magnesium Level


 1.8 mg/dL


(1.8-2.4) 


 


 





 


Glucose (Fingerstick)


 


 149 mg/dL


(70-99) 


 











Laboratory Tests








Test


 5/1/20


12:20 5/1/20


17:31 5/1/20


23:34 5/2/20


05:45


 


Glucose (Fingerstick)


 147 mg/dL


(70-99) 140 mg/dL


(70-99) 134 mg/dL


(70-99) 





 


White Blood Count


 


 


 


 9.7 x10^3/uL


(4.0-11.0)


 


Red Blood Count


 


 


 


 2.46 x10^6/uL


(3.50-5.40)


 


Hemoglobin


 


 


 


 7.2 g/dL


(12.0-15.5)


 


Hematocrit


 


 


 


 22.4 %


(36.0-47.0)


 


Mean Corpuscular Volume    91 fL () 


 


Mean Corpuscular Hemoglobin    29 pg (25-35) 


 


Mean Corpuscular Hemoglobin


Concent 


 


 


 32 g/dL


(31-37)


 


Red Cell Distribution Width


 


 


 


 18.1 %


(11.5-14.5)


 


Platelet Count


 


 


 


 419 x10^3/uL


(140-400)


 


Neutrophils (%) (Auto)    77 % (31-73) 


 


Lymphocytes (%) (Auto)    16 % (24-48) 


 


Monocytes (%) (Auto)    5 % (0-9) 


 


Eosinophils (%) (Auto)    2 % (0-3) 


 


Basophils (%) (Auto)    0 % (0-3) 


 


Neutrophils # (Auto)


 


 


 


 7.4 x10^3/uL


(1.8-7.7)


 


Lymphocytes # (Auto)


 


 


 


 1.5 x10^3/uL


(1.0-4.8)


 


Monocytes # (Auto)


 


 


 


 0.4 x10^3/uL


(0.0-1.1)


 


Eosinophils # (Auto)


 


 


 


 0.2 x10^3/uL


(0.0-0.7)


 


Basophils # (Auto)


 


 


 


 0.0 x10^3/uL


(0.0-0.2)


 


Sodium Level


 


 


 


 154 mmol/L


(136-145)


 


Potassium Level


 


 


 


 2.9 mmol/L


(3.5-5.1)


 


Chloride Level


 


 


 


 111 mmol/L


()


 


Carbon Dioxide Level


 


 


 


 34 mmol/L


(21-32)


 


Anion Gap    9 (6-14) 


 


Blood Urea Nitrogen


 


 


 


 45 mg/dL


(7-20)


 


Creatinine


 


 


 


 1.0 mg/dL


(0.6-1.0)


 


Estimated GFR


(Cockcroft-Gault) 


 


 


 58.9 





 


Glucose Level


 


 


 


 153 mg/dL


(70-99)


 


Calcium Level


 


 


 


 8.6 mg/dL


(8.5-10.1)


 


Phosphorus Level


 


 


 


 3.6 mg/dL


(2.6-4.7)


 


Magnesium Level


 


 


 


 1.8 mg/dL


(1.8-2.4)


 


Test


 5/2/20


05:48 


 


 





 


Glucose (Fingerstick)


 149 mg/dL


(70-99) 


 


 











Microbiology


4/29/20 Blood Culture - Preliminary, Resulted


          NO GROWTH AFTER 2 DAYS


4/27/20 Aerobic and Anaerobic Culture, Resulted


          Pending


4/27/20 Anaerobic Culture Result 1 (ANSON), Resulted


          Pending


4/27/20 Aerobic Culture - Preliminary, Resulted


          


4/27/20 Aerobic Culture Result 1 (ANSON) - Preliminary, Resulted


          


4/27/20 Gram Stain - Final, Resulted


          


4/27/20 Gram Stain Result 1 (ANSON) - Final, Resulted


          


4/27/20 Gram Stain Result 2 (ANSON) - Final, Resulted


          


4/12/20 Urine Culture - Final, Complete


          


4/12/20 Urine Culture Result 1 (ANSON) - Final, Complete


Medications





Current Medications


Sodium Chloride 1,000 ml @  1,000 mls/hr Q1H IV  Last administered on 3/16/20at 

03:00;  Start 3/16/20 at 03:00;  Stop 3/16/20 at 03:59;  Status DC


Ondansetron HCl (Zofran) 4 mg 1X  ONCE IVP  Last administered on 3/16/20at 

03:27;  Start 3/16/20 at 03:00;  Stop 3/16/20 at 03:01;  Status DC


Morphine Sulfate (Morphine Sulfate) 4 mg 1X  ONCE IV ;  Start 3/16/20 at 03:00; 

Stop 3/16/20 at 03:01;  Status Cancel


Ketorolac Tromethamine (Toradol 30mg Vial) 30 mg 1X  ONCE IV  Last administered 

on 3/16/20at 02:54;  Start 3/16/20 at 03:00;  Stop 3/16/20 at 03:01;  Status DC


Fentanyl Citrate (Fentanyl 2ml Vial) 25 mcg 1X  ONCE IVP  Last administered on 

3/16/20at 03:23;  Start 3/16/20 at 03:30;  Stop 3/16/20 at 03:31;  Status DC


Fentanyl Citrate (Fentanyl 2ml Vial) 100 mcg STK-MED ONCE .ROUTE ;  Start 

3/16/20 at 03:18;  Stop 3/16/20 at 03:18;  Status DC


Iohexol (Omnipaque 350 Mg/ml) 90 ml 1X  ONCE IV  Last administered on 3/16/20at 

03:25;  Start 3/16/20 at 03:30;  Stop 3/16/20 at 03:31;  Status DC


Info (CONTRAST GIVEN -- Rx MONITORING) 1 each PRN DAILY  PRN MC SEE COMMENTS;  

Start 3/16/20 at 03:30;  Stop 3/18/20 at 03:29;  Status DC


Hydromorphone HCl (Dilaudid) 0.5 mg 1X  ONCE IV  Last administered on 3/16/20at 

03:55;  Start 3/16/20 at 04:30;  Stop 3/16/20 at 04:32;  Status DC


Ondansetron HCl (Zofran) 4 mg PRN Q8HRS  PRN IV NAUSEA/VOMITING 1ST CHOICE;  

Start 3/16/20 at 05:00;  Stop 3/16/20 at 09:27;  Status DC


Morphine Sulfate (Morphine Sulfate) 2 mg PRN Q2HR  PRN IV SEVERE PAIN 7-10 Last 

administered on 3/17/20at 12:26;  Start 3/16/20 at 05:00;  Stop 3/17/20 at 

14:15;  Status DC


Sodium Chloride 1,000 ml @  125 mls/hr Q8H IV  Last administered on 3/16/20at 

20:56;  Start 3/16/20 at 05:00;  Stop 3/17/20 at 04:59;  Status DC


Hydromorphone HCl (Dilaudid) 0.5 mg PRN Q3HRS  PRN IV SEVERE PAIN 7-10 Last 

administered on 3/17/20at 10:06;  Start 3/16/20 at 05:00;  Stop 3/17/20 at 12:0

1;  Status DC


Piperacillin Sod/ Tazobactam Sod 4.5 gm/Sodium Chloride 100 ml @  200 mls/hr 1X 

ONCE IV  Last administered on 3/16/20at 05:44;  Start 3/16/20 at 06:00;  Stop 

3/16/20 at 06:29;  Status DC


Ondansetron HCl (Zofran) 4 mg PRN Q4HRS  PRN IV NAUSEA/VOMITING 1ST CHOICE Last 

administered on 4/24/20at 11:01;  Start 3/16/20 at 09:30


Insulin Human Lispro (HumaLOG) 0-9 UNITS Q6HRS SQ  Last administered on 

4/30/20at 17:29;  Start 3/16/20 at 09:30


Dextrose (Dextrose 50%-Water Syringe) 12.5 gm PRN Q15MIN  PRN IV SEE COMMENTS;  

Start 3/16/20 at 09:30


Pantoprazole Sodium (PROTONIX VIAL for IV PUSH) 40 mg DAILYAC IVP  Last 

administered on 5/2/20at 08:20;  Start 3/16/20 at 11:30


Prochlorperazine Edisylate (Compazine) 10 mg PRN Q6HRS  PRN IV NAUSEA/VOMITING, 

2nd CHOICE Last administered on 4/29/20at 14:59;  Start 3/16/20 at 17:45


Atenolol (Tenormin) 100 mg DAILY PO ;  Start 3/17/20 at 09:00;  Stop 3/16/20 at 

20:08;  Status DC


Metoprolol Tartrate (Lopressor Vial) 2.5 mg Q6HRS IVP  Last administered on 

3/17/20at 05:51;  Start 3/16/20 at 20:15;  Stop 3/17/20 at 10:02;  Status DC


Metoprolol Tartrate (Lopressor Vial) 5 mg Q6HRS IVP  Last administered on 

3/26/20at 00:12;  Start 3/17/20 at 10:15;  Stop 3/28/20 at 08:48;  Status DC


Hydromorphone HCl (Dilaudid) 1 mg PRN Q3HRS  PRN IV SEVERE PAIN 7-10 Last 

administered on 3/23/20at 05:13;  Start 3/17/20 at 12:00;  Stop 3/31/20 at 

00:25;  Status DC


Lidocaine HCl (Buffered Lidocaine 1%) 3 ml STK-MED ONCE .ROUTE ;  Start 3/17/20 

at 12:55;  Stop 3/17/20 at 12:56;  Status DC


Albumin Human 500 ml @  125 mls/hr 1X  ONCE IV  Last administered on 3/17/20at 

14:33;  Start 3/17/20 at 14:30;  Stop 3/17/20 at 18:32;  Status DC


Norepinephrine Bitartrate 8 mg/ Dextrose 258 ml @  17.299 mls/ hr CONT  PRN IV 

PER PROTOCOL Last administered on 4/14/20at 12:48;  Start 3/17/20 at 15:30;  

Stop 4/17/20 at 09:19;  Status DC


Sodium Chloride 1,000 ml @  125 mls/hr Q8H IV  Last administered on 3/17/20at 

21:04;  Start 3/17/20 at 16:00;  Stop 3/18/20 at 02:42;  Status DC


Albumin Human 500 ml @  125 mls/hr PRN BID  PRN IV After every 2L NSS & BP < 

90mm Last administered on 4/1/20at 14:21;  Start 3/17/20 at 16:00


Iohexol (Omnipaque 300 Mg/ml) 60 ml 1X  ONCE IV  Last administered on 3/17/20at 

17:20;  Start 3/17/20 at 17:00;  Stop 3/17/20 at 17:01;  Status DC


Info (CONTRAST GIVEN -- Rx MONITORING) 1 each PRN DAILY  PRN MC SEE COMMENTS;  

Start 3/17/20 at 17:00;  Stop 3/19/20 at 16:59;  Status DC


Meropenem 1 gm/ Sodium Chloride 100 ml @  200 mls/hr Q8HRS IV  Last administered

on 3/18/20at 05:45;  Start 3/17/20 at 20:00;  Stop 3/18/20 at 08:48;  Status DC


Furosemide (Lasix) 40 mg 1X  ONCE IVP  Last administered on 3/17/20at 22:12;  

Start 3/17/20 at 22:30;  Stop 3/17/20 at 22:31;  Status DC


Calcium Chloride 1000 mg/Sodium Chloride 110 ml @  220 mls/hr 1X  ONCE IV  Last 

administered on 3/17/20at 22:11;  Start 3/17/20 at 22:30;  Stop 3/17/20 at 

22:59;  Status DC


Albuterol Sulfate (Ventolin Neb Soln) 2.5 mg 1X  ONCE NEB  Last administered on 

3/18/20at 00:56;  Start 3/17/20 at 22:30;  Stop 3/17/20 at 22:31;  Status DC


Insulin Human Regular (HumuLIN R VIAL) 5 unit 1X  ONCE IV  Last administered on 

3/17/20at 22:14;  Start 3/17/20 at 22:30;  Stop 3/17/20 at 22:31;  Status DC


Magnesium Sulfate 50 ml @ 25 mls/hr 1X  ONCE IV  Last administered on 3/18/20at 

02:57;  Start 3/18/20 at 03:00;  Stop 3/18/20 at 04:59;  Status DC


Calcium Gluconate 1000 mg/Sodium Chloride 110 ml @  220 mls/hr 1X  ONCE IV  Last

administered on 3/18/20at 02:46;  Start 3/18/20 at 03:00;  Stop 3/18/20 at 

03:29;  Status DC


Sodium Chloride 1,000 ml @  200 mls/hr Q5H IV  Last administered on 3/18/20at 

02:46;  Start 3/18/20 at 03:00;  Stop 3/18/20 at 10:21;  Status DC


Calcium Gluconate 1000 mg/Sodium Chloride 110 ml @  220 mls/hr 1X  ONCE IV  Last

administered on 3/18/20at 03:21;  Start 3/18/20 at 03:30;  Stop 3/18/20 at 

03:59;  Status DC


Sodium Bicarbonate 50 meq/Sodium Chloride 1,050 ml @  75 mls/hr Q14H IV  Last 

administered on 3/22/20at 21:10;  Start 3/18/20 at 07:30;  Stop 3/23/20 at 

10:28;  Status DC


Calcium Gluconate 2000 mg/Sodium Chloride 120 ml @  220 mls/hr 1X  ONCE IV  Last

administered on 3/18/20at 09:05;  Start 3/18/20 at 07:30;  Stop 3/18/20 at 

08:02;  Status DC


Lidocaine HCl (Xylocaine-Mpf 1% 2ml Vial) 2 ml STK-MED ONCE .ROUTE ;  Start 

3/18/20 at 08:47;  Stop 3/18/20 at 08:47;  Status DC


Meropenem 500 mg/ Sodium Chloride 50 ml @  100 mls/hr Q12HR IV  Last 

administered on 3/23/20at 21:01;  Start 3/18/20 at 18:00;  Stop 3/24/20 at 

07:58;  Status DC


Lidocaine HCl (Buffered Lidocaine 1%) 3 ml STK-MED ONCE .ROUTE ;  Start 3/18/20 

at 09:46;  Stop 3/18/20 at 09:46;  Status DC


Lidocaine HCl (Buffered Lidocaine 1%) 6 ml 1X  ONCE INJ  Last administered on 

3/18/20at 10:26;  Start 3/18/20 at 10:15;  Stop 3/18/20 at 10:16;  Status DC


Info (Tpn Per Pharmacy) 1 each PRN DAILY  PRN MC SEE COMMENTS Last administered 

on 5/1/20at 13:25;  Start 3/18/20 at 12:00


Sodium Chloride 1,000 ml @  1,000 mls/hr Q1H PRN IV hypotension;  Start 3/18/20 

at 12:07;  Stop 3/18/20 at 18:06;  Status DC


Diphenhydramine HCl (Benadryl) 25 mg 1X PRN  PRN IV ITCHING;  Start 3/18/20 at 

12:15;  Stop 3/19/20 at 12:14;  Status DC


Diphenhydramine HCl (Benadryl) 25 mg 1X PRN  PRN IV ITCHING;  Start 3/18/20 at 

12:15;  Stop 3/19/20 at 12:14;  Status DC


Sodium Chloride 1,000 ml @  400 mls/hr Q2H30M PRN IV PATENCY;  Start 3/18/20 at 

12:07;  Stop 3/19/20 at 00:06;  Status DC


Info (PHARMACY MONITORING -- do not chart) 1 each PRN DAILY  PRN MC SEE 

COMMENTS;  Start 3/18/20 at 12:15;  Stop 3/20/20 at 08:13;  Status DC


Sodium Chloride 90 meq/Calcium Gluconate 10 meq/ Multivitamins 10 ml/Chromium/ 

Copper/Manganese/ Seleni/Zn 1 ml/ Total Parenteral Nutrition/Amino 

Acids/Dextrose/ Fat Emulsion Intravenous 55.005 ml  @ 2.292 mls/hr TPN  CONT IV 

;  Start 3/18/20 at 22:00;  Stop 3/18/20 at 12:33;  Status DC


Info (Tpn Per Pharmacy) 1 each PRN DAILY  PRN MC SEE COMMENTS;  Start 3/18/20 at

12:30;  Status UNV


Sodium Chloride 90 meq/Calcium Gluconate 10 meq/ Multivitamins 10 ml/Chromium/ 

Copper/Manganese/ Seleni/Zn 0.5 ml/ Total Parenteral Nutrition/Amino 

Acids/Dextrose/ Fat Emulsion Intravenous 1,512 ml @  63 mls/hr TPN  CONT IV  

Last administered on 3/18/20at 22:06;  Start 3/18/20 at 22:00;  Stop 3/19/20 at 

21:59;  Status DC


Calcium Carbonate/ Glycine (Tums) 500 mg PRN AFTMEALHC  PRN PO INDIGESTION;  

Start 3/18/20 at 17:45


Calcium Gluconate (Calcium Gluconate) 2,000 mg 1X  ONCE IVP  Last administered 

on 3/19/20at 02:19;  Start 3/19/20 at 02:15;  Stop 3/19/20 at 02:16;  Status DC


Calcium Chloride 3000 mg/Sodium Chloride 1,030 ml @  50 mls/hr [id number] IV  Last 

administered on 3/21/20at 02:17;  Start 3/19/20 at 08:00;  Stop 3/21/20 at 

15:23;  Status DC


Lorazepam (Ativan Inj) 1 mg PRN Q4HRS  PRN IVP ANXIETY / AGITATION, 2nd choic 

Last administered on 4/17/20at 03:51;  Start 3/19/20 at 09:00;  Stop 4/17/20 at 

09:19;  Status DC


Sodium Chloride 1,000 ml @  1,000 mls/hr Q1H PRN IV hypotension;  Start 3/19/20 

at 08:56;  Stop 3/19/20 at 14:55;  Status DC


Albumin Human 200 ml @  200 mls/hr 1X PRN  PRN IV Hypotension;  Start 3/19/20 at

09:00;  Stop 3/19/20 at 14:59;  Status DC


Diphenhydramine HCl (Benadryl) 25 mg 1X PRN  PRN IV ITCHING;  Start 3/19/20 at 

09:00;  Stop 3/20/20 at 08:59;  Status DC


Diphenhydramine HCl (Benadryl) 25 mg 1X PRN  PRN IV ITCHING;  Start 3/19/20 at 

09:00;  Stop 3/20/20 at 08:59;  Status DC


Sodium Chloride 1,000 ml @  400 mls/hr Q2H30M PRN IV PATENCY;  Start 3/19/20 at 

08:56;  Stop 3/19/20 at 20:55;  Status DC


Info (PHARMACY MONITORING -- do not chart) 1 each PRN DAILY  PRN MC SEE 

COMMENTS;  Start 3/19/20 at 09:00;  Status UNV


Info (PHARMACY MONITORING -- do not chart) 1 each PRN DAILY  PRN MC SEE 

COMMENTS;  Start 3/19/20 at 09:00;  Stop 3/20/20 at 08:13;  Status DC


Digoxin (Lanoxin) 500 mcg 1X  ONCE IV  Last administered on 3/19/20at 10:04;  

Start 3/19/20 at 10:00;  Stop 3/19/20 at 10:01;  Status DC


Digoxin (Lanoxin) 125 mcg 1X  ONCE IV  Last administered on 3/19/20at 17:10;  

Start 3/19/20 at 18:00;  Stop 3/19/20 at 18:01;  Status DC


Magnesium Sulfate 100 ml @  25 mls/hr 1X  ONCE IV  Last administered on 

3/19/20at 12:48;  Start 3/19/20 at 13:00;  Stop 3/19/20 at 16:59;  Status DC


Sodium Chloride 90 meq/Magnesium Sulfate 10 meq/ Calcium Gluconate 20 meq/ 

Multivitamins 10 ml/Chromium/ Copper/Manganese/ Seleni/Zn 0.5 ml/ Total 

Parenteral Nutrition/Amino Acids/Dextrose/ Fat Emulsion Intravenous 1,512 ml @  

63 mls/hr TPN  CONT IV  Last administered on 3/19/20at 22:25;  Start 3/19/20 at 

22:00;  Stop 3/20/20 at 21:59;  Status DC


Sodium Chloride 1,000 ml @  1,000 mls/hr Q1H PRN IV hypotension;  Start 3/20/20 

at 08:05;  Stop 3/20/20 at 14:04;  Status DC


Albumin Human 200 ml @  200 mls/hr 1X  ONCE IV  Last administered on 3/20/20at 

08:57;  Start 3/20/20 at 08:15;  Stop 3/20/20 at 09:14;  Status DC


Diphenhydramine HCl (Benadryl) 25 mg 1X PRN  PRN IV ITCHING;  Start 3/20/20 at 

08:15;  Stop 3/21/20 at 08:14;  Status DC


Diphenhydramine HCl (Benadryl) 25 mg 1X PRN  PRN IV ITCHING;  Start 3/20/20 at 

08:15;  Stop 3/21/20 at 08:14;  Status DC


Sodium Chloride 1,000 ml @  400 mls/hr Q2H30M PRN IV PATENCY;  Start 3/20/20 at 

08:05;  Stop 3/20/20 at 20:04;  Status DC


Info (PHARMACY MONITORING -- do not chart) 1 each PRN DAILY  PRN MC SEE 

COMMENTS;  Start 3/20/20 at 08:15;  Stop 3/24/20 at 07:57;  Status DC


Sodium Chloride 90 meq/Potassium Chloride 15 meq/ Potassium Phosphate 10 mmol/ 

Magnesium Sulfate 10 meq/Calcium Gluconate 20 meq/ Multivitamins 10 ml/Chromium/

Copper/Manganese/ Seleni/Zn 0.5 ml/ Total Parenteral Nutrition/Amino 

Acids/Dextrose/ Fat Emulsion Intravenous 1,512 ml @  63 mls/hr TPN  CONT IV  

Last administered on 3/20/20at 21:01;  Start 3/20/20 at 22:00;  Stop 3/21/20 at 

21:59;  Status DC


Potassium Chloride/Water 100 ml @  100 mls/hr 1X  ONCE IV  Last administered on 

3/20/20at 14:09;  Start 3/20/20 at 14:00;  Stop 3/20/20 at 14:59;  Status DC


Benzocaine (Hurricaine One) 1 spray 1X  ONCE MM  Last administered on 3/20/20at 

16:38;  Start 3/20/20 at 14:30;  Stop 3/20/20 at 14:31;  Status DC


Lidocaine HCl (Glydo (Lidocaine) Jelly) 1 thomas 1X  ONCE MM  Last administered on 

3/20/20at 16:38;  Start 3/20/20 at 14:30;  Stop 3/20/20 at 14:31;  Status DC


Linezolid/Dextrose 300 ml @  300 mls/hr Q12HR IV  Last administered on 3/26/20at

21:04;  Start 3/20/20 at 20:00;  Stop 3/27/20 at 07:50;  Status DC


Acetaminophen (Tylenol) 650 mg PRN Q6HRS  PRN PO MILD PAIN / TEMP;  Start 3/21/2

0 at 03:30;  Stop 3/21/20 at 03:36;  Status DC


Acetaminophen (Tylenol) 650 mg PRN Q6HRS  PRN PEG MILD PAIN / TEMP Last 

administered on 4/16/20at 19:56;  Start 3/21/20 at 03:36


Sodium Chloride 1,000 ml @  1,000 mls/hr Q1H PRN IV hypotension;  Start 3/21/20 

at 07:50;  Stop 3/21/20 at 13:49;  Status DC


Albumin Human 200 ml @  200 mls/hr 1X PRN  PRN IV Hypotension;  Start 3/21/20 at

08:00;  Stop 3/21/20 at 13:59;  Status DC


Sodium Chloride (Normal Saline Flush) 10 ml 1X PRN  PRN IV AP catheter pack;  

Start 3/21/20 at 08:00;  Stop 3/22/20 at 07:59;  Status DC


Sodium Chloride (Normal Saline Flush) 10 ml 1X PRN  PRN IV  catheter pack;  S

tart 3/21/20 at 08:00;  Stop 3/22/20 at 07:59;  Status DC


Sodium Chloride 1,000 ml @  400 mls/hr Q2H30M PRN IV PATENCY;  Start 3/21/20 at 

07:50;  Stop 3/21/20 at 19:49;  Status DC


Info (PHARMACY MONITORING -- do not chart) 1 each PRN DAILY  PRN MC SEE COMM

ENTS;  Start 3/21/20 at 08:00;  Status UNV


Info (PHARMACY MONITORING -- do not chart) 1 each PRN DAILY  PRN MC SEE 

COMMENTS;  Start 3/21/20 at 08:00;  Stop 3/23/20 at 08:25;  Status DC


Sodium Chloride 90 meq/Potassium Chloride 15 meq/ Potassium Phosphate 10 mmol/ 

Magnesium Sulfate 10 meq/Calcium Gluconate 20 meq/ Multivitamins 10 ml/Chromium/

Copper/Manganese/ Seleni/Zn 0.5 ml/ Total Parenteral Nutrition/Amino 

Acids/Dextrose/ Fat Emulsion Intravenous 1,512 ml @  63 mls/hr TPN  CONT IV  

Last administered on 3/21/20at 20:57;  Start 3/21/20 at 22:00;  Stop 3/22/20 at 

21:59;  Status DC


Sodium Chloride 90 meq/Potassium Chloride 15 meq/ Potassium Phosphate 15 mmol/ 

Magnesium Sulfate 10 meq/Calcium Gluconate 20 meq/ Multivitamins 10 ml/Chromium/

Copper/Manganese/ Seleni/Zn 0.5 ml/ Total Parenteral Nutrition/Amino 

Acids/Dextrose/ Fat Emulsion Intravenous 1,512 ml @  63 mls/hr TPN  CONT IV ;  

Start 3/22/20 at 22:00;  Stop 3/22/20 at 14:16;  Status DC


Sodium Chloride 90 meq/Potassium Chloride 15 meq/ Potassium Phosphate 15 mmol/ 

Magnesium Sulfate 10 meq/Calcium Gluconate 20 meq/ Multivitamins 10 ml/Chromium/

Copper/Manganese/ Seleni/Zn 0.5 ml/ Total Parenteral Nutrition/Amino 

Acids/Dextrose/ Fat Emulsion Intravenous 1,200 ml @  50 mls/hr TPN  CONT IV ;  

Start 3/22/20 at 22:00;  Stop 3/22/20 at 14:17;  Status DC


Sodium Chloride 90 meq/Potassium Chloride 15 meq/ Potassium Phosphate 10 mmol/ 

Magnesium Sulfate 10 meq/Calcium Gluconate 20 meq/ Multivitamins 10 ml/Chromium/

Copper/Manganese/ Seleni/Zn 0.5 ml/ Total Parenteral Nutrition/Amino 

Acids/Dextrose/ Fat Emulsion Intravenous 1,200 ml @  50 mls/hr TPN  CONT IV  

Last administered on 3/22/20at 23:29;  Start 3/22/20 at 22:00;  Stop 3/23/20 at 

21:59;  Status DC


Sodium Chloride 1,000 ml @  1,000 mls/hr Q1H PRN IV hypotension;  Start 3/23/20 

at 07:28;  Stop 3/23/20 at 13:27;  Status DC


Albumin Human 200 ml @  200 mls/hr 1X  ONCE IV  Last administered on 3/23/20at 

08:51;  Start 3/23/20 at 07:30;  Stop 3/23/20 at 08:29;  Status DC


Diphenhydramine HCl (Benadryl) 25 mg 1X PRN  PRN IV ITCHING;  Start 3/23/20 at 

07:30;  Stop 3/24/20 at 07:29;  Status DC


Diphenhydramine HCl (Benadryl) 25 mg 1X PRN  PRN IV ITCHING;  Start 3/23/20 at 

07:30;  Stop 3/24/20 at 07:29;  Status DC


Sodium Chloride 1,000 ml @  400 mls/hr Q2H30M PRN IV PATENCY;  Start 3/23/20 at 

07:28;  Stop 3/23/20 at 19:27;  Status DC


Info (PHARMACY MONITORING -- do not chart) 1 each PRN DAILY  PRN MC SEE 

COMMENTS;  Start 3/23/20 at 07:30;  Stop 4/3/20 at 13:01;  Status DC


Metronidazole 100 ml @  100 mls/hr Q6HRS IV  Last administered on 4/8/20at 

06:26;  Start 3/23/20 at 08:30;  Stop 4/8/20 at 09:58;  Status DC


Micafungin Sodium 100 mg/Dextrose 100 ml @  100 mls/hr Q24H IV  Last 

administered on 4/30/20at 08:18;  Start 3/23/20 at 09:00;  Stop 4/30/20 at 

20:58;  Status DC


Propofol 0 ml @ As Directed STK-MED ONCE IV ;  Start 3/23/20 at 07:53;  Stop 

3/23/20 at 07:53;  Status DC


Etomidate (Amidate) 20 mg STK-MED ONCE IV ;  Start 3/23/20 at 07:53;  Stop 

3/23/20 at 07:54;  Status DC


Midazolam HCl (Versed) 5 mg STK-MED ONCE .ROUTE ;  Start 3/23/20 at 07:57;  Stop

3/23/20 at 07:57;  Status DC


Fentanyl Citrate 30 ml @ 0 mls/hr CONT  PRN IV SEE PROTOCOL Last administered on

4/17/20at 06:12;  Start 3/23/20 at 08:15;  Stop 4/17/20 at 09:19;  Status DC


Artificial Tears (Artificial Tears) 1 drop PRN Q1HR  PRN OU DRY EYE, 1st choice;

 Start 3/23/20 at 08:15;  Stop 4/29/20 at 05:31;  Status DC


Midazolam HCl 50 mg/Sodium Chloride 50 ml @ 0 mls/hr CONT  PRN IV SEE PROTOCOL 

Last administered on 3/26/20at 22:39;  Start 3/23/20 at 08:15;  Stop 3/28/20 at 

15:59;  Status DC


Etomidate (Amidate) 8 mg 1X  ONCE IV  Last administered on 3/23/20at 08:33;  

Start 3/23/20 at 08:30;  Stop 3/23/20 at 08:31;  Status DC


Succinylcholine Chloride (Anectine) 120 mg 1X  ONCE IV  Last administered on 

3/23/20at 08:34;  Start 3/23/20 at 08:30;  Stop 3/23/20 at 08:31;  Status DC


Midazolam HCl (Versed) 5 mg 1X  ONCE IV ;  Start 3/23/20 at 08:30;  Stop 3/23/20

at 08:31;  Status DC


Potassium Chloride 15 meq/ Bicarbonate Dialysis Soln w/ out KCl 5,007.5 ml  @ 

1,000 mls/ hr Q5H1M IV  Last administered on 3/24/20at 11:11;  Start 3/23/20 at 

12:00;  Stop 3/24/20 at 11:15;  Status DC


Potassium Chloride 15 meq/ Bicarbonate Dialysis Soln w/ out KCl 5,007.5 ml  @ 

1,000 mls/ hr Q5H1M IV  Last administered on 3/24/20at 11:12;  Start 3/23/20 at 

12:00;  Stop 3/24/20 at 11:17;  Status DC


Potassium Chloride 15 meq/ Bicarbonate Dialysis Soln w/ out KCl 5,007.5 ml  @ 

1,000 mls/ hr Q5H1M IV  Last administered on 3/24/20at 11:11;  Start 3/23/20 at 

12:00;  Stop 3/24/20 at 11:19;  Status DC


Sodium Chloride 90 meq/Potassium Chloride 15 meq/ Potassium Phosphate 10 mmol/ 

Magnesium Sulfate 10 meq/Calcium Gluconate 20 meq/ Multivitamins 10 ml/Chromium/

Copper/Manganese/ Seleni/Zn 0.5 ml/ Total Parenteral Nutrition/Amino 

Acids/Dextrose/ Fat Emulsion Intravenous 1,400 ml @  58.333 mls/ hr TPN  CONT IV

 Last administered on 3/23/20at 21:42;  Start 3/23/20 at 22:00;  Stop 3/24/20 at

21:59;  Status DC


Heparin Sodium (Porcine) (Heparin Sodium) 5,000 unit Q8HRS SQ  Last administered

on 3/28/20at 05:55;  Start 3/23/20 at 15:00;  Stop 3/28/20 at 13:28;  Status DC


Meropenem 500 mg/ Sodium Chloride 50 ml @  100 mls/hr Q6HRS IV  Last 

administered on 3/25/20at 06:00;  Start 3/24/20 at 09:00;  Stop 3/25/20 at 

07:29;  Status DC


Potassium Phosphate 20 mmol/ Sodium Chloride 106.6667 ml @  51.667 m... 1X  ONCE

IV  Last administered on 3/24/20at 11:22;  Start 3/24/20 at 10:15;  Stop 3/24/20

at 12:18;  Status DC


Acetaminophen (Tylenol Supp) 650 mg PRN Q6HRS  PRN FL MILD PAIN / TEMP > 100.3'F

Last administered on 4/27/20at 00:32;  Start 3/24/20 at 10:30


Potassium Chloride/Water 100 ml @  100 mls/hr Q1H IV  Last administered on 

3/24/20at 12:12;  Start 3/24/20 at 11:00;  Stop 3/24/20 at 12:59;  Status DC


Potassium Chloride 20 meq/ Bicarbonate Dialysis Soln w/ out KCl 5,010 ml @  

1,000 mls/hr Q5H1M IV  Last administered on 3/25/20at 08:48;  Start 3/24/20 at 

12:00;  Stop 3/25/20 at 13:03;  Status DC


Potassium Chloride 20 meq/ Bicarbonate Dialysis Soln w/ out KCl 5,010 ml @  

1,000 mls/hr Q5H1M IV  Last administered on 3/29/20at 14:52;  Start 3/24/20 at 

11:30;  Stop 3/29/20 at 19:59;  Status DC


Potassium Chloride 20 meq/ Bicarbonate Dialysis Soln w/ out KCl 5,010 ml @  

1,000 mls/hr Q5H1M IV  Last administered on 3/29/20at 14:53;  Start 3/24/20 at 

11:30;  Stop 3/29/20 at 19:59;  Status DC


Sodium Chloride 90 meq/Potassium Chloride 15 meq/ Potassium Phosphate 15 mmol/ 

Magnesium Sulfate 10 meq/Calcium Gluconate 15 meq/ Multivitamins 10 ml/Chromium/

Copper/Manganese/ Seleni/Zn 0.5 ml/ Total Parenteral Nutrition/Amino 

Acids/Dextrose/ Fat Emulsion Intravenous 1,400 ml @  58.333 mls/ hr TPN  CONT IV

 Last administered on 3/24/20at 22:17;  Start 3/24/20 at 22:00;  Stop 3/25/20 at

21:59;  Status DC


Cefepime HCl (Maxipime) 2 gm Q12HR IVP  Last administered on 4/7/20at 20:56;  

Start 3/25/20 at 09:00;  Stop 4/8/20 at 09:58;  Status DC


Daptomycin 500 mg/ Sodium Chloride 50 ml @  100 mls/hr Q48H IV  Last 

administered on 4/10/20at 09:57;  Start 3/25/20 at 08:30;  Stop 4/10/20 at 

10:07;  Status DC


Lidocaine HCl (Buffered Lidocaine 1%) 3 ml 1X  ONCE INJ  Last administered on 

3/25/20at 10:27;  Start 3/25/20 at 10:30;  Stop 3/25/20 at 10:31;  Status DC


Potassium Phosphate 20 mmol/ Sodium Chloride 106.6667 ml @  51.667 m... 1X  ONCE

IV  Last administered on 3/25/20at 12:51;  Start 3/25/20 at 13:00;  Stop 3/25/20

at 15:03;  Status DC


Sodium Chloride 90 meq/Potassium Chloride 15 meq/ Potassium Phosphate 18 mmol/ 

Magnesium Sulfate 8 meq/Calcium Gluconate 15 meq/ Multivitamins 10 ml/Chromium/ 

Copper/Manganese/ Seleni/Zn 0.5 ml/ Total Parenteral Nutrition/Amino 

Acids/Dextrose/ Fat Emulsion Intravenous 1,400 ml @  58.333 mls/ hr TPN  CONT IV

 Last administered on 3/25/20at 22:16;  Start 3/25/20 at 22:00;  Stop 3/26/20 at

21:59;  Status DC


Potassium Chloride 20 meq/ Bicarbonate Dialysis Soln w/ out KCl 5,010 ml @  

1,000 mls/hr Q5H1M IV  Last administered on 3/29/20at 14:54;  Start 3/25/20 at 

16:00;  Stop 3/29/20 at 19:59;  Status DC


Multi-Ingred Cream/Lotion/Oil/ Oint (Artificial Tears Eye Ointment) 1 thomas PRN 

Q1HR  PRN OU DRY EYE, 2nd choice Last administered on 4/13/20at 08:19;  Start 

3/25/20 at 17:30


Sodium Chloride 90 meq/Potassium Chloride 15 meq/ Potassium Phosphate 18 mmol/ 

Magnesium Sulfate 8 meq/Calcium Gluconate 15 meq/ Multivitamins 10 ml/Chromium/ 

Copper/Manganese/ Seleni/Zn 0.5 ml/ Total Parenteral Nutrition/Amino Ac

ids/Dextrose/ Fat Emulsion Intravenous 1,400 ml @  58.333 mls/ hr TPN  CONT IV  

Last administered on 3/26/20at 22:00;  Start 3/26/20 at 22:00;  Stop 3/27/20 at 

21:59;  Status DC


Albumin Human 500 ml @  125 mls/hr 1X  ONCE IV ;  Start 3/26/20 at 14:15;  Stop 

3/26/20 at 18:14;  Status DC


Sodium Chloride 90 meq/Potassium Chloride 15 meq/ Potassium Phosphate 18 mmol/ 

Magnesium Sulfate 8 meq/Calcium Gluconate 15 meq/ Multivitamins 10 ml/Chromium/ 

Copper/Manganese/ Seleni/Zn 0.5 ml/ Insulin Human Regular 10 unit/ Total 

Parenteral Nutrition/Amino Acids/Dextrose/ Fat Emulsion Intravenous 1,400 ml @  

58.333 mls/ hr TPN  CONT IV  Last administered on 3/27/20at 21:43;  Start 

3/27/20 at 22:00;  Stop 3/28/20 at 21:59;  Status DC


Lidocaine HCl (Buffered Lidocaine 1%) 3 ml STK-MED ONCE .ROUTE ;  Start 3/25/20 

at 10:00;  Stop 3/27/20 at 13:57;  Status DC


Midazolam HCl 100 mg/Sodium Chloride 100 ml @ 7 mls/hr CONT  PRN IV SEE PROTOCOL

Last administered on 4/8/20at 15:35;  Start 3/28/20 at 16:00


Sodium Chloride 90 meq/Potassium Chloride 15 meq/ Potassium Phosphate 18 mmol/ 

Magnesium Sulfate 8 meq/Calcium Gluconate 15 meq/ Multivitamins 10 ml/Chromium/ 

Copper/Manganese/ Seleni/Zn 0.5 ml/ Insulin Human Regular 15 unit/ Total 

Parenteral Nutrition/Amino Acids/Dextrose/ Fat Emulsion Intravenous 1,400 ml @  

58.333 mls/ hr TPN  CONT IV  Last administered on 3/28/20at 20:34;  Start 

3/28/20 at 22:00;  Stop 3/29/20 at 21:59;  Status DC


Info ([facility] Electrolyte Protocol) 1 ea CONT PRN  PRN MC PER PROTOCOL;  Start 

3/29/20 at 13:15


Sodium Chloride 90 meq/Potassium Chloride 15 meq/ Potassium Phosphate 18 mmol/ 

Magnesium Sulfate 8 meq/Calcium Gluconate 15 meq/ Multivitamins 10 ml/Chromium/ 

Copper/Manganese/ Seleni/Zn 0.5 ml/ Insulin Human Regular 15 unit/ Total 

Parenteral Nutrition/Amino Acids/Dextrose/ Fat Emulsion Intravenous 1,400 ml @  

58.333 mls/ hr TPN  CONT IV  Last administered on 3/29/20at 22:05;  Start 

3/29/20 at 22:00;  Stop 3/30/20 at 21:59;  Status DC


Potassium Chloride 15 meq/ Bicarbonate Dialysis Soln w/ out KCl 5,007.5 ml  @ 

1,000 mls/ hr Q5H1M IV  Last administered on 4/1/20at 18:14;  Start 3/29/20 at 

20:00;  Stop 4/2/20 at 13:08;  Status DC


Potassium Chloride 15 meq/ Bicarbonate Dialysis Soln w/ out KCl 5,007.5 ml  @ 

1,000 mls/ hr Q5H1M IV  Last administered on 4/1/20at 18:14;  Start 3/29/20 at 

20:00;  Stop 4/2/20 at 13:08;  Status DC


Potassium Chloride 15 meq/ Bicarbonate Dialysis Soln w/ out KCl 5,007.5 ml  @ 

1,000 mls/ hr Q5H1M IV  Last administered on 4/1/20at 18:14;  Start 3/29/20 at 

20:00;  Stop 4/2/20 at 13:08;  Status DC


Iohexol (Omnipaque 240 Mg/ml) 30 ml 1X  ONCE PO  Last administered on 3/30/20at 

11:30;  Start 3/30/20 at 11:30;  Stop 3/30/20 at 11:33;  Status DC


Info (CONTRAST GIVEN -- Rx MONITORING) 1 each PRN DAILY  PRN MC SEE COMMENTS;  

Start 3/30/20 at 11:45;  Stop 4/1/20 at 11:44;  Status DC


Sodium Chloride 90 meq/Potassium Chloride 15 meq/ Potassium Phosphate 18 mmol/ 

Magnesium Sulfate 8 meq/Calcium Gluconate 15 meq/ Multivitamins 10 ml/Chromium/ 

Copper/Manganese/ Seleni/Zn 0.5 ml/ Insulin Human Regular 15 unit/ Total 

Parenteral Nutrition/Amino Acids/Dextrose/ Fat Emulsion Intravenous 1,400 ml @  

58.333 mls/ hr TPN  CONT IV  Last administered on 3/30/20at 21:47;  Start 

3/30/20 at 22:00;  Stop 3/31/20 at 21:59;  Status DC


Sodium Chloride 90 meq/Potassium Chloride 15 meq/ Potassium Phosphate 18 mmol/ 

Magnesium Sulfate 8 meq/Calcium Gluconate 15 meq/ Multivitamins 10 ml/Chromium/ 

Copper/Manganese/ Seleni/Zn 0.5 ml/ Insulin Human Regular 20 unit/ Total 

Parenteral Nutrition/Amino Acids/Dextrose/ Fat Emulsion Intravenous 1,400 ml @  

58.333 mls/ hr TPN  CONT IV  Last administered on 3/31/20at 21:36;  Start 

3/31/20 at 22:00;  Stop 4/1/20 at 21:59;  Status DC


Alteplase, Recombinant (Cathflo For Central Catheter Clearance) 1 mg 1X  ONCE 

INT CAT  Last administered on 3/31/20at 20:03;  Start 3/31/20 at 19:30;  Stop 

3/31/20 at 19:46;  Status DC


Alteplase, Recombinant (Cathflo For Central Catheter Clearance) 1 mg 1X  ONCE 

INT CAT  Last administered on 3/31/20at 22:05;  Start 3/31/20 at 22:00;  Stop 

3/31/20 at 22:01;  Status DC


Sodium Chloride 90 meq/Potassium Chloride 15 meq/ Potassium Phosphate 18 mmol/ 

Magnesium Sulfate 8 meq/Calcium Gluconate 15 meq/ Multivitamins 10 ml/Chromium/ 

Copper/Manganese/ Seleni/Zn 0.5 ml/ Insulin Human Regular 20 unit/ Total 

Parenteral Nutrition/Amino Acids/Dextrose/ Fat Emulsion Intravenous 1,400 ml @  

58.333 mls/ hr TPN  CONT IV  Last administered on 4/1/20at 21:30;  Start 4/1/20 

at 22:00;  Stop 4/2/20 at 21:59;  Status DC


Dexmedetomidine HCl 400 mcg/ Sodium Chloride 100 ml @ 0 mls/hr CONT  PRN IV 

ANXIETY / AGITATION Last administered on 5/2/20at 05:41;  Start 4/2/20 at 08:15


Sodium Chloride 500 ml @  500 mls/hr 1X PRN  PRN IV ELEVATED BP, SEE COMMENTS;  

Start 4/2/20 at 08:15


Atropine Sulfate (ATROPINE 0.5mg SYRINGE) 0.5 mg PRN Q5MIN  PRN IV SEE COMMENTS;

 Start 4/2/20 at 08:15


Furosemide (Lasix) 20 mg 1X  ONCE IVP  Last administered on 4/2/20at 08:19;  

Start 4/2/20 at 08:15;  Stop 4/2/20 at 08:16;  Status DC


Lidocaine HCl (Buffered Lidocaine 1%) 3 ml STK-MED ONCE .ROUTE ;  Start 4/2/20 

at 08:39;  Stop 4/2/20 at 08:39;  Status DC


Lidocaine HCl (Buffered Lidocaine 1%) 6 ml 1X  ONCE INJ  Last administered on 

4/2/20at 09:05;  Start 4/2/20 at 09:00;  Stop 4/2/20 at 09:06;  Status DC


Sodium Chloride 90 meq/Potassium Chloride 15 meq/ Potassium Phosphate 18 mmol/ 

Magnesium Sulfate 8 meq/Calcium Gluconate 15 meq/ Multivitamins 10 ml/Chromium/ 

Copper/Manganese/ Seleni/Zn 0.5 ml/ Insulin Human Regular 20 unit/ Total 

Parenteral Nutrition/Amino Acids/Dextrose/ Fat Emulsion Intravenous 1,400 ml @  

58.333 mls/ hr TPN  CONT IV  Last administered on 4/2/20at 22:45;  Start 4/2/20 

at 22:00;  Stop 4/3/20 at 21:59;  Status DC


Sodium Chloride 1,000 ml @  1,000 mls/hr Q1H PRN IV hypotension;  Start 4/3/20 

at 07:30;  Stop 4/3/20 at 13:29;  Status DC


Albumin Human 200 ml @  200 mls/hr 1X PRN  PRN IV Hypotension Last administered 

on 4/3/20at 09:36;  Start 4/3/20 at 07:30;  Stop 4/3/20 at 13:29;  Status DC


Sodium Chloride (Normal Saline Flush) 10 ml 1X PRN  PRN IV AP catheter pack;  

Start 4/3/20 at 07:30;  Stop 4/3/20 at 21:29;  Status DC


Sodium Chloride (Normal Saline Flush) 10 ml 1X PRN  PRN IV  catheter pack;  

Start 4/3/20 at 07:30;  Stop 4/4/20 at 07:29;  Status DC


Sodium Chloride 1,000 ml @  400 mls/hr Q2H30M PRN IV PATENCY;  Start 4/3/20 at 

07:30;  Stop 4/3/20 at 19:29;  Status DC


Info (PHARMACY MONITORING -- do not chart) 1 each PRN DAILY  PRN MC SEE 

COMMENTS;  Start 4/3/20 at 07:30;  Stop 4/3/20 at 13:02;  Status DC


Info (PHARMACY MONITORING -- do not chart) 1 each PRN DAILY  PRN MC SEE 

COMMENTS;  Start 4/3/20 at 07:30;  Stop 4/5/20 at 12:45;  Status DC


Sodium Chloride 90 meq/Potassium Chloride 15 meq/ Potassium Phosphate 10 mmol/ 

Magnesium Sulfate 8 meq/Calcium Gluconate 15 meq/ Multivitamins 10 ml/Chromium/ 

Copper/Manganese/ Seleni/Zn 0.5 ml/ Insulin Human Regular 25 unit/ Total 

Parenteral Nutrition/Amino Acids/Dextrose/ Fat Emulsion Intravenous 1,400 ml @  

58.333 mls/ hr TPN  CONT IV  Last administered on 4/3/20at 22:19;  Start 4/3/20 

at 22:00;  Stop 4/4/20 at 21:59;  Status DC


Heparin Sodium (Porcine) (Heparin Sodium) 5,000 unit Q12HR SQ  Last administered

on 4/26/20at 08:59;  Start 4/3/20 at 21:00;  Stop 4/26/20 at 10:05;  Status DC


Ondansetron HCl (Zofran) 4 mg PRN Q6HRS  PRN IV NAUSEA/VOMITING;  Start 4/6/20 

at 07:00;  Stop 4/7/20 at 06:59;  Status DC


Fentanyl Citrate (Fentanyl 2ml Vial) 25 mcg PRN Q5MIN  PRN IV MILD PAIN 1-3;  

Start 4/6/20 at 07:00;  Stop 4/7/20 at 06:59;  Status DC


Fentanyl Citrate (Fentanyl 2ml Vial) 50 mcg PRN Q5MIN  PRN IV MODERATE TO SEVERE

PAIN;  Start 4/6/20 at 07:00;  Stop 4/7/20 at 06:59;  Status DC


Ringer's Solution 1,000 ml @  30 mls/hr Q24H IV ;  Start 4/6/20 at 07:00;  Stop 

4/6/20 at 18:59;  Status DC


Lidocaine HCl (Xylocaine-Mpf 1% 2ml Vial) 2 ml PRN 1X  PRN ID PRIOR TO IV START;

 Start 4/6/20 at 07:00;  Stop 4/7/20 at 06:59;  Status DC


Prochlorperazine Edisylate (Compazine) 5 mg PACU PRN  PRN IV NAUSEA, MRX1;  

Start 4/6/20 at 07:00;  Stop 4/7/20 at 06:59;  Status DC


Sodium Chloride 1,000 ml @  1,000 mls/hr Q1H PRN IV hypotension;  Start 4/4/20 

at 09:10;  Stop 4/4/20 at 15:09;  Status DC


Albumin Human 200 ml @  200 mls/hr 1X PRN  PRN IV Hypotension Last administered 

on 4/4/20at 10:10;  Start 4/4/20 at 09:15;  Stop 4/4/20 at 15:14;  Status DC


Sodium Chloride 1,000 ml @  400 mls/hr Q2H30M PRN IV PATENCY;  Start 4/4/20 at 

09:10;  Stop 4/4/20 at 21:09;  Status DC


Info (PHARMACY MONITORING -- do not chart) 1 each PRN DAILY  PRN MC SEE 

COMMENTS;  Start 4/4/20 at 09:15;  Stop 4/5/20 at 12:45;  Status DC


Info (PHARMACY MONITORING -- do not chart) 1 each PRN DAILY  PRN MC SEE 

COMMENTS;  Start 4/4/20 at 09:15;  Stop 4/5/20 at 12:45;  Status DC


Sodium Chloride 90 meq/Potassium Chloride 15 meq/ Potassium Phosphate 10 mmol/ 

Magnesium Sulfate 8 meq/Calcium Gluconate 15 meq/ Multivitamins 10 ml/Chromium/ 

Copper/Manganese/ Seleni/Zn 0.5 ml/ Insulin Human Regular 25 unit/ Total 

Parenteral Nutrition/Amino Acids/Dextrose/ Fat Emulsion Intravenous 1,400 ml @  

58.333 mls/ hr TPN  CONT IV  Last administered on 4/4/20at 22:10;  Start 4/4/20 

at 22:00;  Stop 4/5/20 at 21:59;  Status DC


Magnesium Sulfate 50 ml @ 25 mls/hr PRN DAILY  PRN IV for Mag < 1.7 on am labs 

Last administered on 4/20/20at 17:27;  Start 4/5/20 at 09:15


Sodium Chloride 90 meq/Potassium Chloride 15 meq/ Potassium Phosphate 10 mmol/ 

Magnesium Sulfate 8 meq/Calcium Gluconate 15 meq/ Multivitamins 10 ml/Chromium/ 

Copper/Manganese/ Seleni/Zn 0.5 ml/ Insulin Human Regular 25 unit/ Total 

Parenteral Nutrition/Amino Acids/Dextrose/ Fat Emulsion Intravenous 1,400 ml @  

58.333 mls/ hr TPN  CONT IV  Last administered on 4/5/20at 21:20;  Start 4/5/20 

at 22:00;  Stop 4/6/20 at 21:59;  Status DC


Sodium Chloride 1,000 ml @  1,000 mls/hr Q1H PRN IV hypotension;  Start 4/5/20 

at 12:23;  Stop 4/5/20 at 18:22;  Status DC


Albumin Human 200 ml @  200 mls/hr 1X  ONCE IV  Last administered on 4/5/20at 

13:34;  Start 4/5/20 at 12:30;  Stop 4/5/20 at 13:29;  Status DC


Diphenhydramine HCl (Benadryl) 25 mg 1X PRN  PRN IV ITCHING;  Start 4/5/20 at 

12:30;  Stop 4/6/20 at 12:29;  Status DC


Diphenhydramine HCl (Benadryl) 25 mg 1X PRN  PRN IV ITCHING;  Start 4/5/20 at 

12:30;  Stop 4/6/20 at 12:29;  Status DC


Info (PHARMACY MONITORING -- do not chart) 1 each PRN DAILY  PRN MC SEE 

COMMENTS;  Start 4/5/20 at 12:30;  Status Cancel


Bupivacaine HCl/ Epinephrine Bitart (Sensorcain-Epi 0.5%-1:[id number] Mpf) 30 ml 

STK-MED ONCE .ROUTE  Last administered on 4/6/20at 11:44;  Start 4/6/20 at 

11:00;  Stop 4/6/20 at 11:01;  Status DC


Cellulose (Surgicel Fibrillar 1x2) 1 each STK-MED ONCE .ROUTE ;  Start 4/6/20 at

11:00;  Stop 4/6/20 at 11:01;  Status DC


Sodium Chloride 90 meq/Potassium Chloride 15 meq/ Potassium Phosphate 10 mmol/ 

Magnesium Sulfate 12 meq/Calcium Gluconate 15 meq/ Multivitamins 10 ml/Chromium/

Copper/Manganese/ Seleni/Zn 0.5 ml/ Insulin Human Regular 25 unit/ Total 

Parenteral Nutrition/Amino Acids/Dextrose/ Fat Emulsion Intravenous 1,400 ml @  

58.333 mls/ hr TPN  CONT IV  Last administered on 4/6/20at 22:24;  Start 4/6/20 

at 22:00;  Stop 4/7/20 at 21:59;  Status DC


Propofol 20 ml @ As Directed STK-MED ONCE IV ;  Start 4/6/20 at 11:07;  Stop 

4/6/20 at 11:07;  Status DC


Cellulose (Surgicel Hemostat 4x8) 1 each STK-MED ONCE .ROUTE  Last administered 

on 4/6/20at 11:44;  Start 4/6/20 at 11:55;  Stop 4/6/20 at 11:56;  Status DC


Sevoflurane (Ultane) 60 ml STK-MED ONCE IH ;  Start 4/6/20 at 12:46;  Stop 

4/6/20 at 12:46;  Status DC


Sodium Chloride 1,000 ml @  1,000 mls/hr Q1H PRN IV hypotension;  Start 4/6/20 

at 13:51;  Stop 4/6/20 at 19:50;  Status DC


Albumin Human 200 ml @  200 mls/hr 1X PRN  PRN IV Hypotension Last administered 

on 4/6/20at 14:51;  Start 4/6/20 at 14:00;  Stop 4/6/20 at 19:59;  Status DC


Diphenhydramine HCl (Benadryl) 25 mg 1X PRN  PRN IV ITCHING;  Start 4/6/20 at 

14:00;  Stop 4/7/20 at 13:59;  Status DC


Diphenhydramine HCl (Benadryl) 25 mg 1X PRN  PRN IV ITCHING;  Start 4/6/20 at 

14:00;  Stop 4/7/20 at 13:59;  Status DC


Sodium Chloride 1,000 ml @  400 mls/hr Q2H30M PRN IV PATENCY;  Start 4/6/20 at 

13:51;  Stop 4/7/20 at 01:50;  Status DC


Info (PHARMACY MONITORING -- do not chart) 1 each PRN DAILY  PRN MC SEE 

COMMENTS;  Start 4/6/20 at 14:00;  Stop 4/9/20 at 08:16;  Status DC


Heparin Sodium (Porcine) (Hep Lock Adult) 500 unit STK-MED ONCE IVP ;  Start 

4/7/20 at 09:29;  Stop 4/7/20 at 09:30;  Status DC


Sodium Chloride 1,000 ml @  1,000 mls/hr Q1H PRN IV hypotension;  Start 4/7/20 

at 10:43;  Stop 4/7/20 at 16:42;  Status DC


Sodium Chloride 1,000 ml @  400 mls/hr Q2H30M PRN IV PATENCY;  Start 4/7/20 at 

10:43;  Stop 4/7/20 at 22:42;  Status DC


Info (PHARMACY MONITORING -- do not chart) 1 each PRN DAILY  PRN MC SEE 

COMMENTS;  Start 4/7/20 at 10:45;  Status UNV


Info (PHARMACY MONITORING -- do not chart) 1 each PRN DAILY  PRN MC SEE 

COMMENTS;  Start 4/7/20 at 10:45;  Status UNV


Sodium Chloride 90 meq/Potassium Chloride 15 meq/ Magnesium Sulfate 12 

meq/Calcium Gluconate 15 meq/ Multivitamins 10 ml/Chromium/ Copper/Manganese/ 

Seleni/Zn 0.5 ml/ Insulin Human Regular 25 unit/ Total Parenteral 

Nutrition/Amino Acids/Dextrose/ Fat Emulsion Intravenous 1,400 ml @  58.333 mls/

hr TPN  CONT IV  Last administered on 4/7/20at 22:13;  Start 4/7/20 at 22:00;  

Stop 4/8/20 at 21:59;  Status DC


Sodium Chloride 1,000 ml @  1,000 mls/hr Q1H PRN IV hypotension;  Start 4/8/20 

at 07:50;  Stop 4/8/20 at 13:49;  Status DC


Albumin Human 200 ml @  200 mls/hr 1X  ONCE IV ;  Start 4/8/20 at 08:00;  Stop 

4/8/20 at 08:53;  Status DC


Diphenhydramine HCl (Benadryl) 25 mg 1X PRN  PRN IV ITCHING;  Start 4/8/20 at 

08:00;  Stop 4/9/20 at 07:59;  Status DC


Diphenhydramine HCl (Benadryl) 25 mg 1X PRN  PRN IV ITCHING;  Start 4/8/20 at 

08:00;  Stop 4/9/20 at 07:59;  Status DC


Info (PHARMACY MONITORING -- do not chart) 1 each PRN DAILY  PRN MC SEE 

COMMENTS;  Start 4/8/20 at 08:00;  Stop 4/9/20 at 08:16;  Status DC


Albumin Human 50 ml @ 50 mls/hr 1X  ONCE IV ;  Start 4/8/20 at 08:53;  Stop 

4/8/20 at 08:56;  Status DC


Albumin Human 200 ml @  50 mls/hr PRN 1X  PRN IV HYPOTENSION Last administered 

on 4/14/20at 11:54;  Start 4/8/20 at 09:00


Meropenem 500 mg/ Sodium Chloride 50 ml @  100 mls/hr Q12H IV  Last administered

on 4/28/20at 10:45;  Start 4/8/20 at 10:00;  Stop 4/28/20 at 12:37;  Status DC


Sodium Chloride 90 meq/Magnesium Sulfate 12 meq/ Calcium Gluconate 15 meq/ 

Multivitamins 10 ml/Chromium/ Copper/Manganese/ Seleni/Zn 0.5 ml/ Insulin Human 

Regular 25 unit/ Total Parenteral Nutrition/Amino Acids/Dextrose/ Fat Emulsion 

Intravenous 1,400 ml @  58.333 mls/ hr TPN  CONT IV  Last administered on 4/8 /20at 21:41;  Start 4/8/20 at 22:00;  Stop 4/9/20 at 21:59;  Status DC


Sodium Chloride 1,000 ml @  1,000 mls/hr Q1H PRN IV hypotension;  Start 4/9/20 

at 07:58;  Stop 4/9/20 at 13:57;  Status DC


Albumin Human 200 ml @  200 mls/hr 1X PRN  PRN IV Hypotension Last administered 

on 4/9/20at 09:30;  Start 4/9/20 at 08:00;  Stop 4/9/20 at 13:59;  Status DC


Sodium Chloride 1,000 ml @  400 mls/hr Q2H30M PRN IV PATENCY;  Start 4/9/20 at 

07:58;  Stop 4/9/20 at 19:57;  Status DC


Info (PHARMACY MONITORING -- do not chart) 1 each PRN DAILY  PRN MC SEE 

COMMENTS;  Start 4/9/20 at 08:00;  Status Cancel


Info (PHARMACY MONITORING -- do not chart) 1 each PRN DAILY  PRN MC SEE 

COMMENTS;  Start 4/9/20 at 08:15;  Status UNV


Sodium Chloride 90 meq/Potassium Phosphate 5 mmol/ Magnesium Sulfate 12 

meq/Calcium Gluconate 15 meq/ Multivitamins 10 ml/Chromium/ Copper/Manganese/ 

Seleni/Zn 0.5 ml/ Insulin Human Regular 30 unit/ Total Parenteral 

Nutrition/Amino Acids/Dextrose/ Fat Emulsion Intravenous 1,400 ml @  58.333 mls/

hr TPN  CONT IV  Last administered on 4/9/20at 22:08;  Start 4/9/20 at 22:00;  

Stop 4/10/20 at 21:59;  Status DC


Linezolid/Dextrose 300 ml @  300 mls/hr Q12HR IV  Last administered on 4/20/20at

20:40;  Start 4/10/20 at 11:00;  Stop 4/21/20 at 08:10;  Status DC


Sodium Chloride 90 meq/Potassium Phosphate 15 mmol/ Magnesium Sulfate 12 

meq/Calcium Gluconate 15 meq/ Multivitamins 10 ml/Chromium/ Copper/Manganese/ 

Seleni/Zn 0.5 ml/ Insulin Human Regular 30 unit/ Total Parenteral 

Nutrition/Amino Acids/Dextrose/ Fat Emulsion Intravenous 1,400 ml @  58.333 mls/

hr TPN  CONT IV  Last administered on 4/10/20at 21:49;  Start 4/10/20 at 22:00; 

Stop 4/11/20 at 21:59;  Status DC


Sodium Chloride 90 meq/Potassium Phosphate 15 mmol/ Magnesium Sulfate 12 

meq/Calcium Gluconate 15 meq/ Multivitamins 10 ml/Chromium/ Copper/Manganese/ 

Seleni/Zn 0.5 ml/ Insulin Human Regular 40 unit/ Total Parenteral 

Nutrition/Amino Acids/Dextrose/ Fat Emulsion Intravenous 1,400 ml @  58.333 mls/

hr TPN  CONT IV  Last administered on 4/11/20at 21:21;  Start 4/11/20 at 22:00; 

Stop 4/12/20 at 21:59;  Status DC


Sodium Chloride 1,000 ml @  1,000 mls/hr Q1H PRN IV hypotension;  Start 4/11/20 

at 13:26;  Stop 4/11/20 at 19:25;  Status DC


Albumin Human 200 ml @  200 mls/hr 1X PRN  PRN IV Hypotension Last administered 

on 4/11/20at 15:00;  Start 4/11/20 at 13:30;  Stop 4/11/20 at 19:29;  Status DC


Sodium Chloride (Normal Saline Flush) 10 ml 1X PRN  PRN IV AP catheter pack;  

Start 4/11/20 at 13:30;  Stop 4/12/20 at 13:29;  Status DC


Sodium Chloride (Normal Saline Flush) 10 ml 1X PRN  PRN IV  catheter pack;  

Start 4/11/20 at 13:30;  Stop 4/12/20 at 13:29;  Status DC


Sodium Chloride 1,000 ml @  400 mls/hr Q2H30M PRN IV PATENCY;  Start 4/11/20 at 

13:26;  Stop 4/12/20 at 01:25;  Status DC


Info (PHARMACY MONITORING -- do not chart) 1 each PRN DAILY  PRN MC SEE 

COMMENTS;  Start 4/11/20 at 13:30;  Stop 4/11/20 at 13:33;  Status DC


Info (PHARMACY MONITORING -- do not chart) 1 each PRN DAILY  PRN MC SEE 

COMMENTS;  Start 4/11/20 at 13:30;  Stop 4/11/20 at 13:34;  Status DC


Sodium Chloride 90 meq/Potassium Phosphate 19 mmol/ Magnesium Sulfate 12 

meq/Calcium Gluconate 15 meq/ Multivitamins 10 ml/Chromium/ Copper/Manganese/ 

Seleni/Zn 0.5 ml/ Insulin Human Regular 40 unit/ Total Parenteral 

Nutrition/Amino Acids/Dextrose/ Fat Emulsion Intravenous 1,400 ml @  58.333 mls/

hr TPN  CONT IV  Last administered on 4/12/20at 21:54;  Start 4/12/20 at 22:00; 

Stop 4/13/20 at 21:59;  Status DC


Sodium Chloride 1,000 ml @  1,000 mls/hr Q1H PRN IV hypotension;  Start 4/13/20 

at 09:35;  Stop 4/13/20 at 15:34;  Status DC


Albumin Human 200 ml @  200 mls/hr 1X PRN  PRN IV Hypotension;  Start 4/13/20 at

09:45;  Stop 4/13/20 at 15:44;  Status DC


Diphenhydramine HCl (Benadryl) 25 mg 1X PRN  PRN IV ITCHING;  Start 4/13/20 at 

09:45;  Stop 4/14/20 at 09:44;  Status DC


Diphenhydramine HCl (Benadryl) 25 mg 1X PRN  PRN IV ITCHING;  Start 4/13/20 at 

09:45;  Stop 4/14/20 at 09:44;  Status DC


Sodium Chloride 1,000 ml @  400 mls/hr Q2H30M PRN IV PATENCY;  Start 4/13/20 at 

09:35;  Stop 4/13/20 at 21:34;  Status DC


Info (PHARMACY MONITORING -- do not chart) 1 each PRN DAILY  PRN MC SEE 

COMMENTS;  Start 4/13/20 at 09:45;  Status Cancel


Sodium Chloride 100 meq/Potassium Phosphate 19 mmol/ Magnesium Sulfate 12 

meq/Calcium Gluconate 15 meq/ Multivitamins 10 ml/Chromium/ Copper/Manganese/ 

Seleni/Zn 0.5 ml/ Insulin Human Regular 40 unit/ Potassium Chloride 20 meq/ 

Total Parenteral Nutrition/Amino Acids/Dextrose/ Fat Emulsion Intravenous 1,400 

ml @  58.333 mls/ hr TPN  CONT IV  Last administered on 4/13/20at 22:02;  Start 

4/13/20 at 22:00;  Stop 4/14/20 at 21:59;  Status DC


Furosemide (Lasix) 40 mg 1X  ONCE IVP  Last administered on 4/13/20at 14:39;  

Start 4/13/20 at 14:30;  Stop 4/13/20 at 14:31;  Status DC


Metronidazole 100 ml @  100 mls/hr Q8HRS IV  Last administered on 4/21/20at 06:0

4;  Start 4/14/20 at 10:00;  Stop 4/21/20 at 08:10;  Status DC


Sodium Chloride 1,000 ml @  1,000 mls/hr Q1H PRN IV hypotension;  Start 4/14/20 

at 08:00;  Stop 4/14/20 at 13:59;  Status DC


Albumin Human 200 ml @  200 mls/hr 1X PRN  PRN IV Hypotension;  Start 4/14/20 at

08:00;  Stop 4/14/20 at 13:59;  Status DC


Sodium Chloride 1,000 ml @  400 mls/hr Q2H30M PRN IV PATENCY;  Start 4/14/20 at 

08:00;  Stop 4/14/20 at 19:59;  Status DC


Info (PHARMACY MONITORING -- do not chart) 1 each PRN DAILY  PRN MC SEE COMMENT

S;  Start 4/14/20 at 11:30;  Status UNV


Info (PHARMACY MONITORING -- do not chart) 1 each PRN DAILY  PRN MC SEE 

COMMENTS;  Start 4/14/20 at 11:30;  Stop 4/16/20 at 12:13;  Status DC


Sodium Chloride 100 meq/Potassium Phosphate 19 mmol/ Magnesium Sulfate 12 

meq/Calcium Gluconate 15 meq/ Multivitamins 10 ml/Chromium/ Copper/Manganese/ 

Seleni/Zn 0.5 ml/ Insulin Human Regular 40 unit/ Potassium Chloride 20 meq/ 

Total Parenteral Nutrition/Amino Acids/Dextrose/ Fat Emulsion Intravenous 1,400 

ml @  58.333 mls/ hr TPN  CONT IV  Last administered on 4/14/20at 21:52;  Start 

4/14/20 at 22:00;  Stop 4/15/20 at 21:59;  Status DC


Sodium Chloride (Normal Saline Flush) 10 ml QSHIFT  PRN IV AFTER MEDS AND BLOOD 

DRAWS;  Start 4/14/20 at 15:00


Sodium Chloride (Normal Saline Flush) 10 ml PRN Q5MIN  PRN IV AFTER MEDS AND 

BLOOD DRAWS;  Start 4/14/20 at 15:00


Sodium Chloride (Normal Saline Flush) 20 ml PRN Q5MIN  PRN IV AFTER MEDS AND 

BLOOD DRAWS;  Start 4/14/20 at 15:00


Sodium Chloride 100 meq/Potassium Phosphate 19 mmol/ Magnesium Sulfate 12 

meq/Calcium Gluconate 15 meq/ Multivitamins 10 ml/Chromium/ Copper/Manganese/ 

Seleni/Zn 0.5 ml/ Insulin Human Regular 40 unit/ Potassium Chloride 20 meq/ 

Total Parenteral Nutrition/Amino Acids/Dextrose/ Fat Emulsion Intravenous 1,400 

ml @  58.333 mls/ hr TPN  CONT IV  Last administered on 4/15/20at 21:20;  Start 

4/15/20 at 22:00;  Stop 4/16/20 at 21:59;  Status DC


Lidocaine HCl (Buffered Lidocaine 1%) 3 ml STK-MED ONCE .ROUTE ;  Start 4/15/20 

at 13:16;  Stop 4/15/20 at 13:16;  Status DC


Lidocaine HCl (Buffered Lidocaine 1%) 6 ml 1X  ONCE INJ  Last administered on 

4/15/20at 13:45;  Start 4/15/20 at 13:30;  Stop 4/15/20 at 13:31;  Status DC


Albumin Human 100 ml @  100 mls/hr 1X  ONCE IV  Last administered on 4/15/20at 

15:41;  Start 4/15/20 at 15:00;  Stop 4/15/20 at 15:59;  Status DC


Albumin Human 50 ml @ 50 mls/hr 1X  ONCE IV  Last administered on 4/15/20at 

15:00;  Start 4/15/20 at 15:00;  Stop 4/15/20 at 15:59;  Status DC


Info (PHARMACY MONITORING -- do not chart) 1 each PRN DAILY  PRN MC SEE COMM

ENTS;  Start 4/16/20 at 11:30;  Status Cancel


Info (PHARMACY MONITORING -- do not chart) 1 each PRN DAILY  PRN MC SEE 

COMMENTS;  Start 4/16/20 at 11:30;  Status UNV


Sodium Chloride 100 meq/Potassium Phosphate 10 mmol/ Magnesium Sulfate 12 

meq/Calcium Gluconate 15 meq/ Multivitamins 10 ml/Chromium/ Copper/Manganese/ 

Seleni/Zn 0.5 ml/ Insulin Human Regular 35 unit/ Potassium Chloride 20 meq/ 

Total Parenteral Nutrition/Amino Acids/Dextrose/ Fat Emulsion Intravenous 1,400 

ml @  58.333 mls/ hr TPN  CONT IV  Last administered on 4/16/20at 22:10;  Start 

4/16/20 at 22:00;  Stop 4/17/20 at 21:59;  Status DC


Sodium Chloride 100 meq/Potassium Phosphate 5 mmol/ Magnesium Sulfate 12 

meq/Calcium Gluconate 15 meq/ Multivitamins 10 ml/Chromium/ Copper/Manganese/ 

Seleni/Zn 0.5 ml/ Insulin Human Regular 35 unit/ Potassium Chloride 20 meq/ 

Total Parenteral Nutrition/Amino Acids/Dextrose/ Fat Emulsion Intravenous 1,400 

ml @  58.333 mls/ hr TPN  CONT IV  Last administered on 4/17/20at 22:59;  Start 

4/17/20 at 22:00;  Stop 4/18/20 at 21:59;  Status DC


Sodium Chloride 1,000 ml @  1,000 mls/hr Q1H PRN IV hypotension;  Start 4/18/20 

at 08:27;  Stop 4/18/20 at 14:26;  Status DC


Albumin Human 200 ml @  200 mls/hr 1X PRN  PRN IV Hypotension Last administered 

on 4/18/20at 09:18;  Start 4/18/20 at 08:30;  Stop 4/18/20 at 14:29;  Status DC


Sodium Chloride 1,000 ml @  400 mls/hr Q2H30M PRN IV PATENCY;  Start 4/18/20 at 

08:27;  Stop 4/18/20 at 20:26;  Status DC


Info (PHARMACY MONITORING -- do not chart) 1 each PRN DAILY  PRN MC SEE 

COMMENTS;  Start 4/18/20 at 08:30;  Status Cancel


Info (PHARMACY MONITORING -- do not chart) 1 each PRN DAILY  PRN MC SEE 

COMMENTS;  Start 4/18/20 at 08:30;  Stop 4/26/20 at 13:10;  Status DC


Sodium Chloride 100 meq/Potassium Chloride 40 meq/ Magnesium Sulfate 15 meq

/Calcium Gluconate 15 meq/ Multivitamins 10 ml/Chromium/ Copper/Manganese/ 

Seleni/Zn 0.5 ml/ Insulin Human Regular 35 unit/ Total Parenteral 

Nutrition/Amino Acids/Dextrose/ Fat Emulsion Intravenous 1,400 ml @  58.333 mls/

hr TPN  CONT IV  Last administered on 4/18/20at 22:00;  Start 4/18/20 at 22:00; 

Stop 4/19/20 at 21:59;  Status DC


Potassium Chloride/Water 100 ml @  100 mls/hr 1X  ONCE IV  Last administered on 

4/18/20at 17:28;  Start 4/18/20 at 14:45;  Stop 4/18/20 at 15:44;  Status DC


Sodium Chloride 100 meq/Potassium Chloride 40 meq/ Magnesium Sulfate 15 

meq/Calcium Gluconate 15 meq/ Multivitamins 10 ml/Chromium/ Copper/Manganese/ 

Seleni/Zn 0.5 ml/ Insulin Human Regular 35 unit/ Total Parenteral 

Nutrition/Amino Acids/Dextrose/ Fat Emulsion Intravenous 1,400 ml @  58.333 mls/

hr TPN  CONT IV  Last administered on 4/19/20at 22:46;  Start 4/19/20 at 22:00; 

Stop 4/20/20 at 21:59;  Status DC


Sodium Chloride 100 meq/Potassium Chloride 40 meq/ Magnesium Sulfate 20 

meq/Calcium Gluconate 15 meq/ Multivitamins 10 ml/Chromium/ Copper/Manganese/ 

Seleni/Zn 0.5 ml/ Insulin Human Regular 35 unit/ Total Parenteral 

Nutrition/Amino Acids/Dextrose/ Fat Emulsion Intravenous 1,400 ml @  58.333 mls/

hr TPN  CONT IV  Last administered on 4/20/20at 22:31;  Start 4/20/20 at 22:00; 

Stop 4/21/20 at 21:59;  Status DC


Fentanyl Citrate (Fentanyl 2ml Vial) 50 mcg PRN Q2HR  PRN IVP PAIN Last 

administered on 4/27/20at 13:32;  Start 4/20/20 at 21:00;  Stop 4/28/20 at 

12:53;  Status DC


Fentanyl Citrate (Fentanyl 2ml Vial) 25 mcg PRN Q2HR  PRN IVP PAIN;  Start 

4/20/20 at 21:00;  Stop 4/28/20 at 12:54;  Status DC


Enoxaparin Sodium (Lovenox 100mg Syringe) 100 mg Q12HR SQ ;  Start 4/21/20 at 

21:00;  Status UNV


Amino Acids/ Glycerin/ Electrolytes 1,000 ml @  75 mls/hr [id number] IV ;  Start 

4/20/20 at 21:15;  Status UNV


Sodium Chloride 1,000 ml @  1,000 mls/hr Q1H PRN IV hypotension;  Start 4/21/20 

at 07:56;  Stop 4/21/20 at 13:55;  Status DC


Albumin Human 200 ml @  200 mls/hr 1X PRN  PRN IV Hypotension Last administered 

on 4/21/20at 08:40;  Start 4/21/20 at 08:00;  Stop 4/21/20 at 13:59;  Status DC


Sodium Chloride 1,000 ml @  400 mls/hr Q2H30M PRN IV PATENCY;  Start 4/21/20 at 

07:56;  Stop 4/21/20 at 19:55;  Status DC


Info (PHARMACY MONITORING -- do not chart) 1 each PRN DAILY  PRN MC SEE 

COMMENTS;  Start 4/21/20 at 08:00;  Status UNV


Info (PHARMACY MONITORING -- do not chart) 1 each PRN DAILY  PRN MC SEE 

COMMENTS;  Start 4/21/20 at 08:00;  Status UNV


Daptomycin 430 mg/ Sodium Chloride 50 ml @  100 mls/hr Q24H IV  Last 

administered on 4/21/20at 12:35;  Start 4/21/20 at 09:00;  Stop 4/21/20 at 

12:49;  Status DC


Sodium Chloride 100 meq/Potassium Chloride 40 meq/ Magnesium Sulfate 20 

meq/Calcium Gluconate 15 meq/ Multivitamins 10 ml/Chromium/ Copper/Manganese/ 

Seleni/Zn 0.5 ml/ Insulin Human Regular 35 unit/ Total Parenteral 

Nutrition/Amino Acids/Dextrose/ Fat Emulsion Intravenous 1,400 ml @  58.333 mls/

hr TPN  CONT IV  Last administered on 4/21/20at 21:26;  Start 4/21/20 at 22:00; 

Stop 4/22/20 at 21:59;  Status DC


Daptomycin 430 mg/ Sodium Chloride 50 ml @  100 mls/hr Q48H IV ;  Start 4/23/20 

at 09:00;  Stop 4/22/20 at 11:55;  Status DC


Sodium Chloride 100 meq/Potassium Chloride 40 meq/ Magnesium Sulfate 20 

meq/Calcium Gluconate 15 meq/ Multivitamins 10 ml/Chromium/ Copper/Manganese/ 

Seleni/Zn 0.5 ml/ Insulin Human Regular 35 unit/ Total Parenteral 

Nutrition/Amino Acids/Dextrose/ Fat Emulsion Intravenous 1,400 ml @  58.333 mls/

hr TPN  CONT IV  Last administered on 4/22/20at 22:27;  Start 4/22/20 at 22:00; 

Stop 4/23/20 at 21:59;  Status DC


Daptomycin 430 mg/ Sodium Chloride 50 ml @  100 mls/hr Q24H IV  Last 

administered on 4/24/20at 15:07;  Start 4/22/20 at 13:00;  Stop 4/25/20 at 

13:15;  Status DC


Sodium Chloride 100 meq/Potassium Chloride 40 meq/ Magnesium Sulfate 20 

meq/Calcium Gluconate 10 meq/ Multivitamins 10 ml/Chromium/ Copper/Manganese/ 

Seleni/Zn 0.5 ml/ Insulin Human Regular 35 unit/ Total Parenteral 

Nutrition/Amino Acids/Dextrose/ Fat Emulsion Intravenous 1,400 ml @  58.333 mls/

hr TPN  CONT IV  Last administered on 4/24/20at 00:06;  Start 4/23/20 at 22:00; 

Stop 4/24/20 at 21:59;  Status DC


Alteplase, Recombinant (Cathflo For Central Catheter Clearance) 1 mg 1X  ONCE 

INT CAT  Last administered on 4/24/20at 11:44;  Start 4/24/20 at 10:45;  Stop 

4/24/20 at 10:46;  Status DC


Ondansetron HCl (Zofran) 4 mg PRN Q6HRS  PRN IV NAUSEA/VOMITING;  Start 4/27/20 

at 07:00;  Stop 4/28/20 at 06:59;  Status DC


Fentanyl Citrate (Fentanyl 2ml Vial) 25 mcg PRN Q5MIN  PRN IV MILD PAIN 1-3;  

Start 4/27/20 at 07:00;  Stop 4/28/20 at 06:59;  Status DC


Fentanyl Citrate (Fentanyl 2ml Vial) 50 mcg PRN Q5MIN  PRN IV MODERATE TO SEVERE

PAIN Last administered on 4/27/20at 10:17;  Start 4/27/20 at 07:00;  Stop 

4/28/20 at 06:59;  Status DC


Ringer's Solution 1,000 ml @  30 mls/hr Q24H IV ;  Start 4/27/20 at 07:00;  Stop

4/27/20 at 18:59;  Status DC


Lidocaine HCl (Xylocaine-Mpf 1% 2ml Vial) 2 ml PRN 1X  PRN ID PRIOR TO IV START;

 Start 4/27/20 at 07:00;  Stop 4/28/20 at 06:59;  Status DC


Prochlorperazine Edisylate (Compazine) 5 mg PACU PRN  PRN IV NAUSEA, MRX1;  

Start 4/27/20 at 07:00;  Stop 4/28/20 at 06:59;  Status DC


Sodium Acetate 50 meq/Potassium Acetate 55 meq/ Magnesium Sulfate 20 meq/Calcium

Gluconate 10 meq/ Multivitamins 10 ml/Chromium/ Copper/Manganese/ Seleni/Zn 0.5 

ml/ Insulin Human Regular 35 unit/ Total Parenteral Nutrition/Amino 

Acids/Dextrose/ Fat Emulsion Intravenous 1,400 ml @  58.333 mls/ hr TPN  CONT IV

;  Start 4/24/20 at 22:00;  Stop 4/24/20 at 14:15;  Status DC


Sodium Acetate 50 meq/Potassium Acetate 55 meq/ Magnesium Sulfate 20 meq/Calcium

Gluconate 10 meq/ Multivitamins 10 ml/Chromium/ Copper/Manganese/ Seleni/Zn 0.5 

ml/ Insulin Human Regular 35 unit/ Total Parenteral Nutrition/Amino 

Acids/Dextrose/ Fat Emulsion Intravenous 1,800 ml @  75 mls/hr TPN  CONT IV  

Last administered on 4/24/20at 22:38;  Start 4/24/20 at 22:00;  Stop 4/25/20 at 

21:59;  Status DC


Sodium Chloride 1,000 ml @  1,000 mls/hr Q1H PRN IV hypotension;  Start 4/24/20 

at 15:31;  Stop 4/24/20 at 21:30;  Status DC


Diphenhydramine HCl (Benadryl) 25 mg 1X PRN  PRN IV ITCHING;  Start 4/24/20 at 1

5:45;  Stop 4/25/20 at 15:44;  Status DC


Diphenhydramine HCl (Benadryl) 25 mg 1X PRN  PRN IV ITCHING;  Start 4/24/20 at 

15:45;  Stop 4/25/20 at 15:44;  Status DC


Sodium Chloride 1,000 ml @  400 mls/hr Q2H30M PRN IV PATENCY;  Start 4/24/20 at 

15:31;  Stop 4/25/20 at 03:30;  Status DC


Info (PHARMACY MONITORING -- do not chart) 1 each PRN DAILY  PRN MC SEE 

COMMENTS;  Start 4/24/20 at 15:45


Sodium Acetate 50 meq/Potassium Acetate 55 meq/ Magnesium Sulfate 20 meq/Calcium

Gluconate 10 meq/ Multivitamins 10 ml/Chromium/ Copper/Manganese/ Seleni/Zn 0.5 

ml/ Insulin Human Regular 35 unit/ Total Parenteral Nutrition/Amino 

Acids/Dextrose/ Fat Emulsion Intravenous 1,800 ml @  75 mls/hr TPN  CONT IV  

Last administered on 4/25/20at 22:03;  Start 4/25/20 at 22:00;  Stop 4/26/20 at 

21:59;  Status DC


Daptomycin 430 mg/ Sodium Chloride 50 ml @  100 mls/hr Q24H IV  Last 

administered on 4/30/20at 13:00;  Start 4/25/20 at 13:00;  Stop 4/30/20 at 

20:58;  Status DC


Heparin Sodium (Porcine) 1000 unit/Sodium Chloride 1,001 ml @  1,001 mls/hr 1X  

ONCE IRR ;  Start 4/27/20 at 06:00;  Stop 4/27/20 at 06:59;  Status DC


Potassium Acetate 55 meq/Magnesium Sulfate 20 meq/ Calcium Gluconate 10 meq/ 

Multivitamins 10 ml/Chromium/ Copper/Manganese/ Seleni/Zn 0.5 ml/ Insulin Human 

Regular 35 unit/ Total Parenteral Nutrition/Amino Acids/Dextrose/ Fat Emulsion 

Intravenous 1,920 ml @  80 mls/hr TPN  CONT IV  Last administered on 4/26/20at 

22:10;  Start 4/26/20 at 22:00;  Stop 4/27/20 at 21:59;  Status DC


Dexamethasone Sodium Phosphate (Decadron) 4 mg STK-MED ONCE .ROUTE ;  Start 

4/27/20 at 10:56;  Stop 4/27/20 at 10:57;  Status DC


Ondansetron HCl (Zofran) 4 mg STK-MED ONCE .ROUTE ;  Start 4/27/20 at 10:56;  

Stop 4/27/20 at 10:57;  Status DC


Rocuronium Bromide (Zemuron) 50 mg STK-MED ONCE .ROUTE ;  Start 4/27/20 at 

10:56;  Stop 4/27/20 at 10:57;  Status DC


Fentanyl Citrate (Fentanyl 2ml Vial) 100 mcg STK-MED ONCE .ROUTE ;  Start 

4/27/20 at 10:56;  Stop 4/27/20 at 10:57;  Status DC


Bupivacaine HCl/ Epinephrine Bitart (Sensorcain-Epi 0.5%-1:011320 Mpf) 30 ml 

STK-MED ONCE .ROUTE  Last administered on 4/27/20at 12:01;  Start 4/27/20 at 

10:58;  Stop 4/27/20 at 10:58;  Status DC


Cellulose (Surgicel Hemostat 2x14) 1 each STK-MED ONCE .ROUTE ;  Start 4/27/20 

at 10:58;  Stop 4/27/20 at 10:59;  Status DC


Iohexol (Omnipaque 300 Mg/ml) 50 ml STK-MED ONCE .ROUTE ;  Start 4/27/20 at 

10:58;  Stop 4/27/20 at 10:59;  Status DC


Cellulose (Surgicel Hemostat 4x8) 1 each STK-MED ONCE .ROUTE ;  Start 4/27/20 at

10:58;  Stop 4/27/20 at 10:59;  Status DC


Bisacodyl (Dulcolax Supp) 10 mg STK-MED ONCE .ROUTE ;  Start 4/27/20 at 10:59;  

Stop 4/27/20 at 10:59;  Status DC


Heparin Sodium (Porcine) 1000 unit/Sodium Chloride 1,001 ml @  1,001 mls/hr 1X  

ONCE IRR ;  Start 4/27/20 at 12:00;  Stop 4/27/20 at 12:59;  Status DC


Propofol 20 ml @ As Directed STK-MED ONCE IV ;  Start 4/27/20 at 11:05;  Stop 

4/27/20 at 11:05;  Status DC


Sevoflurane (Ultane) 90 ml STK-MED ONCE IH ;  Start 4/27/20 at 11:05;  Stop 

4/27/20 at 11:05;  Status DC


Sevoflurane (Ultane) 60 ml STK-MED ONCE IH ;  Start 4/27/20 at 12:26;  Stop 

4/27/20 at 12:27;  Status DC


Propofol 20 ml @ As Directed STK-MED ONCE IV ;  Start 4/27/20 at 12:26;  Stop 

4/27/20 at 12:27;  Status DC


Phenylephrine HCl (PHENYLEPHRINE in 0.9% NACL PF) 1 mg STK-MED ONCE IV ;  Start 

4/27/20 at 12:34;  Stop 4/27/20 at 12:34;  Status DC


Heparin Sodium (Porcine) (Heparin Sodium) 5,000 unit Q12HR SQ  Last administered

on 5/1/20at 21:41;  Start 4/27/20 at 21:00


Sodium Chloride (Normal Saline Flush) 3 ml QSHIFT  PRN IV AFTER MEDS AND BLOOD 

DRAWS;  Start 4/27/20 at 13:45


Naloxone HCl (Narcan) 0.4 mg PRN Q2MIN  PRN IV SEE INSTRUCTIONS;  Start 4/27/20 

at 13:45


Sodium Chloride 1,000 ml @  25 mls/hr Q24H IV  Last administered on 5/1/20at 

12:35;  Start 4/27/20 at 13:37


Naloxone HCl (Narcan) 0.4 mg PRN Q2MIN  PRN IV SEE INSTRUCTIONS;  Start 4/27/20 

at 14:30;  Status UNV


Sodium Chloride 1,000 ml @  25 mls/hr Q24H IV ;  Start 4/27/20 at 14:30;  Status

UNV


Hydromorphone HCl 30 ml @ 0 mls/hr CONT PRN  PRN IV PER PROTOCOL Last 

administered on 5/1/20at 16:15;  Start 4/27/20 at 14:30


Potassium Acetate 55 meq/Magnesium Sulfate 20 meq/ Calcium Gluconate 10 meq/ 

Multivitamins 10 ml/Chromium/ Copper/Manganese/ Seleni/Zn 0.5 ml/ Insulin Human 

Regular 35 unit/ Total Parenteral Nutrition/Amino Acids/Dextrose/ Fat Emulsion 

Intravenous 1,920 ml @  80 mls/hr TPN  CONT IV  Last administered on 4/27/20at 

22:01;  Start 4/27/20 at 22:00;  Stop 4/28/20 at 21:59;  Status DC


Bumetanide (Bumex) 2 mg [id number] IV  Last administered on 5/1/20at 13:50;  Start 

4/28/20 at 14:00


Meropenem 1 gm/ Sodium Chloride 100 ml @  200 mls/hr Q8HRS IV  Last administered

on 5/2/20at 05:42;  Start 4/28/20 at 14:00


Potassium Acetate 55 meq/Magnesium Sulfate 20 meq/ Calcium Gluconate 10 meq/ 

Multivitamins 10 ml/Chromium/ Copper/Manganese/ Seleni/Zn 0.5 ml/ Insulin Human 

Regular 35 unit/ Total Parenteral Nutrition/Amino Acids/Dextrose/ Fat Emulsion 

Intravenous 1,920 ml @  80 mls/hr TPN  CONT IV  Last administered on 4/28/20at 

22:02;  Start 4/28/20 at 22:00;  Stop 4/29/20 at 21:59;  Status DC


Hydromorphone HCl (Dilaudid Standard PCA) 12 mg STK-MED ONCE IV ;  Start 4/27/20

at 14:35;  Stop 4/28/20 at 13:53;  Status DC


Artificial Tears (Artificial Tears) 1 drop PRN Q15MIN  PRN OU DRY EYE Last 

administered on 5/1/20at 12:34;  Start 4/29/20 at 05:30


Hydromorphone HCl (Dilaudid Standard PCA) 12 mg STK-MED ONCE IV ;  Start 4/28/20

at 12:05;  Stop 4/29/20 at 09:15;  Status DC


Potassium Acetate 65 meq/Magnesium Sulfate 20 meq/ Calcium Gluconate 10 meq/ 

Multivitamins 10 ml/Chromium/ Copper/Manganese/ Seleni/Zn 0.5 ml/ Insulin Human 

Regular 30 unit/ Total Parenteral Nutrition/Amino Acids/Dextrose/ Fat Emulsion 

Intravenous 1,920 ml @  80 mls/hr TPN  CONT IV  Last administered on 4/29/20at 

22:22;  Start 4/29/20 at 22:00;  Stop 4/30/20 at 21:59;  Status DC


Cyclobenzaprine HCl (Flexeril) 10 mg PRN Q6HRS  PRN PO MUSCLE SPASMS;  Start 

4/30/20 at 10:45


Potassium Acetate 55 meq/Magnesium Sulfate 20 meq/ Calcium Gluconate 10 meq/ 

Multivitamins 10 ml/Chromium/ Copper/Manganese/ Seleni/Zn 0.5 ml/ Insulin Human 

Regular 30 unit/ Total Parenteral Nutrition/Amino Acids/Dextrose/ Fat Emulsion 

Intravenous 1,920 ml @  80 mls/hr TPN  CONT IV  Last administered on 5/1/20at 

01:00;  Start 4/30/20 at 22:00;  Stop 5/1/20 at 21:59;  Status DC


Magnesium Sulfate 50 ml @ 25 mls/hr 1X  ONCE IV  Last administered on 4/30/20at 

17:18;  Start 4/30/20 at 12:45;  Stop 4/30/20 at 14:44;  Status DC


Potassium Chloride/Water 100 ml @  100 mls/hr 1X  ONCE IV  Last administered on 

5/1/20at 11:27;  Start 5/1/20 at 12:00;  Stop 5/1/20 at 12:59;  Status DC


Hydromorphone HCl (Dilaudid Standard PCA) 12 mg STK-MED ONCE IV ;  Start 4/29/20

at 10:50;  Stop 5/1/20 at 11:02;  Status DC


Hydromorphone HCl (Dilaudid Standard PCA) 12 mg STK-MED ONCE IV ;  Start 4/30/20

at 13:47;  Stop 5/1/20 at 11:03;  Status DC


Potassium Acetate 30 meq/Magnesium Sulfate 20 meq/ Calcium Gluconate 10 meq/ 

Multivitamins 10 ml/Chromium/ Copper/Manganese/ Seleni/Zn 0.5 ml/ Insulin Human 

Regular 30 unit/ Potassium Chloride 30 meq/ Total Parenteral Nutrition/Amino 

Acids/Dextrose/ Fat Emulsion Intravenous 1,920 ml @  80 mls/hr TPN  CONT IV  

Last administered on 5/1/20at 22:34;  Start 5/1/20 at 22:00;  Stop 5/2/20 at 

21:59


Potassium Chloride/Water 100 ml @  100 mls/hr Q1H IV  Last administered on 

5/2/20at 09:59;  Start 5/2/20 at 07:00;  Stop 5/2/20 at 10:59





Active Scripts


Active


Reported


Bisoprolol Fumarate 5 Mg Tablet 10 Mg PO DAILY


Vitals/I & O





Vital Sign - Last 24 Hours








 5/1/20 5/1/20 5/1/20 5/1/20





 11:00 11:33 12:00 12:00


 


Temp   99.5 





   99.5 


 


Pulse 156  120 


 


Resp 27  26 


 


B/P (MAP) 188/111 (136)  129/62 (84) 


 


Pulse Ox 96 99  


 


O2 Delivery Tracheal Collar Tracheal Collar Tracheal Collar Trach Collar


 


    





    





 5/1/20 5/1/20 5/1/20 5/1/20





 12:55 13:53 15:00 15:46


 


Pulse 110 105 115 


 


Resp 20 25 26 


 


B/P (MAP) 128/52 (77) 146/69 (94) 128/67 (87) 


 


Pulse Ox 97 99 99 


 


O2 Delivery Tracheal Collar Tracheal Collar Tracheal Collar Trach Collar





 5/1/20 5/1/20 5/1/20 5/1/20





 16:00 16:15 16:50 17:00


 


Temp 98.9   





 98.9   


 


Pulse 113   116


 


Resp 28 26 31 29


 


B/P (MAP) 135/76 (95)   140/92 (108)


 


Pulse Ox 99 97 99 98


 


O2 Delivery Tracheal Collar Tracheal Collar Tracheal Collar Tracheal Collar


 


    





    





 5/1/20 5/1/20 5/1/20 5/1/20





 18:00 19:00 20:00 20:00


 


Temp    100.4





    100.4


 


Pulse 120 121  109


 


Resp 30 18  28


 


B/P (MAP) 152/76 (101) 155/81 (105)  198/91 (126)


 


Pulse Ox 97 98  99


 


O2 Delivery Tracheal Collar Tracheal Collar Trach Collar Tracheal Collar


 


O2 Flow Rate   10.0 


 


    





    





 5/1/20 5/1/20 5/1/20 5/1/20





 20:18 21:00 22:00 23:00


 


Pulse  117 93 86


 


Resp  26 22 28


 


B/P (MAP)  162/74 (103) 110/58 (75) 90/49 (63)


 


Pulse Ox 99 95 98 99


 


O2 Delivery Tracheal Collar Tracheal Collar Ventilator Ventilator


 


O2 Flow Rate 10.0   





 5/1/20 5/2/20 5/2/20 5/2/20





 23:00 00:00 00:00 00:15


 


Temp   99.4 





   99.4 


 


Pulse   103 


 


Resp   27 


 


B/P (MAP)   109/78 (88) 


 


Pulse Ox 97  99 97


 


O2 Delivery Ventilator Mechanical Ventilator Ventilator Ventilator


 


O2 Flow Rate    


 


    





    





 5/2/20 5/2/20 5/2/20 5/2/20





 01:00 02:00 02:21 03:00


 


Pulse 97 104  96


 


Resp 24 25  25


 


B/P (MAP) 116/47 (70) 110/67 (81)  110/63 (79)


 


Pulse Ox 98 97 97 97


 


O2 Delivery Ventilator Ventilator Ventilator Ventilator





 5/2/20 5/2/20 5/2/20 5/2/20





 04:00 04:00 04:36 05:00


 


Temp 99.6   





 99.6   


 


Pulse 98   87


 


Resp 25   25


 


B/P (MAP) 100/67 (78)   97/56 (70)


 


Pulse Ox 98  97 97


 


O2 Delivery Ventilator Mechanical Ventilator Ventilator Ventilator


 


    





    





 5/2/20 5/2/20  





 06:00 07:33  


 


Pulse 86   


 


Resp 25   


 


B/P (MAP) 104/52 (69)   


 


Pulse Ox 97 97  


 


O2 Delivery Ventilator Ventilator  














Intake and Output   


 


 5/1/20 5/1/20 5/2/20





 15:00 23:00 07:00


 


Intake Total 100 ml 1551.5 ml 1475.5 ml


 


Output Total 1525 ml 1645 ml 1000 ml


 


Balance -1425 ml -93.5 ml 475.5 ml











Hemodynamically unstable?:  No


Is patient in severe pain?:  No


Is NPO status required?:  No











GAETANO GONCALVES MD         May 2, 2020 10:06

## 2020-05-02 NOTE — PDOC
PULMONARY PROGRESS NOTES


Subjective


Patient intubated on 3/23 , s/p trach 4/6, on vent


Taken to the OR 4/27, NO SURGERY DONE / NOT A CANDIDATE


DID TS/ PM VALVE all day


Vitals





Vital Signs








  Date Time  Temp Pulse Resp B/P (MAP) Pulse Ox O2 Delivery O2 Flow Rate FiO2


 


5/2/20 07:33     97 Ventilator  


 


5/2/20 06:00  86 25 104/52 (69)    


 


5/2/20 04:00 99.6       





 99.6       


 


5/2/20 00:00        








General:  Alert


HEENT:  Other (nc at perrl   nose clear  nech  trach site ok  no lad   no 

thyromegaly)


Lungs:  Crackles


Cardiovascular:  S1, S2


Abdomen:  Soft, Non-tender, Other (distended)


Neuro Exam:  Alert


Extremities:  Other (+3 generalized edema )


Skin:  Warm, Dry


Labs





Laboratory Tests








Test


 4/30/20


12:10 4/30/20


17:26 5/1/20


00:59 5/1/20


06:27


 


Glucose (Fingerstick)


 169 mg/dL


(70-99) 152 mg/dL


(70-99) 152 mg/dL


(70-99) 139 mg/dL


(70-99)


 


Test


 5/1/20


06:30 5/1/20


12:20 5/1/20


17:31 5/1/20


23:34


 


White Blood Count


 14.7 x10^3/uL


(4.0-11.0) 


 


 





 


Red Blood Count


 2.78 x10^6/uL


(3.50-5.40) 


 


 





 


Hemoglobin


 8.1 g/dL


(12.0-15.5) 


 


 





 


Hematocrit


 25.3 %


(36.0-47.0) 


 


 





 


Mean Corpuscular Volume 91 fL ()    


 


Mean Corpuscular Hemoglobin 29 pg (25-35)    


 


Mean Corpuscular Hemoglobin


Concent 32 g/dL


(31-37) 


 


 





 


Red Cell Distribution Width


 18.7 %


(11.5-14.5) 


 


 





 


Platelet Count


 442 x10^3/uL


(140-400) 


 


 





 


Neutrophils (%) (Auto) 80 % (31-73)    


 


Lymphocytes (%) (Auto) 15 % (24-48)    


 


Monocytes (%) (Auto) 4 % (0-9)    


 


Eosinophils (%) (Auto) 1 % (0-3)    


 


Basophils (%) (Auto) 0 % (0-3)    


 


Neutrophils # (Auto)


 11.7 x10^3/uL


(1.8-7.7) 


 


 





 


Lymphocytes # (Auto)


 2.2 x10^3/uL


(1.0-4.8) 


 


 





 


Monocytes # (Auto)


 0.5 x10^3/uL


(0.0-1.1) 


 


 





 


Eosinophils # (Auto)


 0.1 x10^3/uL


(0.0-0.7) 


 


 





 


Basophils # (Auto)


 0.0 x10^3/uL


(0.0-0.2) 


 


 





 


Sodium Level


 153 mmol/L


(136-145) 


 


 





 


Potassium Level


 3.3 mmol/L


(3.5-5.1) 


 


 





 


Chloride Level


 111 mmol/L


() 


 


 





 


Carbon Dioxide Level


 33 mmol/L


(21-32) 


 


 





 


Anion Gap 9 (6-14)    


 


Blood Urea Nitrogen


 47 mg/dL


(7-20) 


 


 





 


Creatinine


 0.9 mg/dL


(0.6-1.0) 


 


 





 


Estimated GFR


(Cockcroft-Gault) 66.5 


 


 


 





 


Glucose Level


 150 mg/dL


(70-99) 


 


 





 


Calcium Level


 8.7 mg/dL


(8.5-10.1) 


 


 





 


Phosphorus Level


 4.6 mg/dL


(2.6-4.7) 


 


 





 


Magnesium Level


 1.8 mg/dL


(1.8-2.4) 


 


 





 


Glucose (Fingerstick)


 


 147 mg/dL


(70-99) 140 mg/dL


(70-99) 134 mg/dL


(70-99)


 


Test


 5/2/20


05:45 5/2/20


05:48 


 





 


White Blood Count


 9.7 x10^3/uL


(4.0-11.0) 


 


 





 


Red Blood Count


 2.46 x10^6/uL


(3.50-5.40) 


 


 





 


Hemoglobin


 7.2 g/dL


(12.0-15.5) 


 


 





 


Hematocrit


 22.4 %


(36.0-47.0) 


 


 





 


Mean Corpuscular Volume 91 fL ()    


 


Mean Corpuscular Hemoglobin 29 pg (25-35)    


 


Mean Corpuscular Hemoglobin


Concent 32 g/dL


(31-37) 


 


 





 


Red Cell Distribution Width


 18.1 %


(11.5-14.5) 


 


 





 


Platelet Count


 419 x10^3/uL


(140-400) 


 


 





 


Neutrophils (%) (Auto) 77 % (31-73)    


 


Lymphocytes (%) (Auto) 16 % (24-48)    


 


Monocytes (%) (Auto) 5 % (0-9)    


 


Eosinophils (%) (Auto) 2 % (0-3)    


 


Basophils (%) (Auto) 0 % (0-3)    


 


Neutrophils # (Auto)


 7.4 x10^3/uL


(1.8-7.7) 


 


 





 


Lymphocytes # (Auto)


 1.5 x10^3/uL


(1.0-4.8) 


 


 





 


Monocytes # (Auto)


 0.4 x10^3/uL


(0.0-1.1) 


 


 





 


Eosinophils # (Auto)


 0.2 x10^3/uL


(0.0-0.7) 


 


 





 


Basophils # (Auto)


 0.0 x10^3/uL


(0.0-0.2) 


 


 





 


Sodium Level


 154 mmol/L


(136-145) 


 


 





 


Potassium Level


 2.9 mmol/L


(3.5-5.1) 


 


 





 


Chloride Level


 111 mmol/L


() 


 


 





 


Carbon Dioxide Level


 34 mmol/L


(21-32) 


 


 





 


Anion Gap 9 (6-14)    


 


Blood Urea Nitrogen


 45 mg/dL


(7-20) 


 


 





 


Creatinine


 1.0 mg/dL


(0.6-1.0) 


 


 





 


Estimated GFR


(Cockcroft-Gault) 58.9 


 


 


 





 


Glucose Level


 153 mg/dL


(70-99) 


 


 





 


Calcium Level


 8.6 mg/dL


(8.5-10.1) 


 


 





 


Phosphorus Level


 3.6 mg/dL


(2.6-4.7) 


 


 





 


Magnesium Level


 1.8 mg/dL


(1.8-2.4) 


 


 





 


Glucose (Fingerstick)


 


 149 mg/dL


(70-99) 


 











Laboratory Tests








Test


 5/1/20


12:20 5/1/20


17:31 5/1/20


23:34 5/2/20


05:45


 


Glucose (Fingerstick)


 147 mg/dL


(70-99) 140 mg/dL


(70-99) 134 mg/dL


(70-99) 





 


White Blood Count


 


 


 


 9.7 x10^3/uL


(4.0-11.0)


 


Red Blood Count


 


 


 


 2.46 x10^6/uL


(3.50-5.40)


 


Hemoglobin


 


 


 


 7.2 g/dL


(12.0-15.5)


 


Hematocrit


 


 


 


 22.4 %


(36.0-47.0)


 


Mean Corpuscular Volume    91 fL () 


 


Mean Corpuscular Hemoglobin    29 pg (25-35) 


 


Mean Corpuscular Hemoglobin


Concent 


 


 


 32 g/dL


(31-37)


 


Red Cell Distribution Width


 


 


 


 18.1 %


(11.5-14.5)


 


Platelet Count


 


 


 


 419 x10^3/uL


(140-400)


 


Neutrophils (%) (Auto)    77 % (31-73) 


 


Lymphocytes (%) (Auto)    16 % (24-48) 


 


Monocytes (%) (Auto)    5 % (0-9) 


 


Eosinophils (%) (Auto)    2 % (0-3) 


 


Basophils (%) (Auto)    0 % (0-3) 


 


Neutrophils # (Auto)


 


 


 


 7.4 x10^3/uL


(1.8-7.7)


 


Lymphocytes # (Auto)


 


 


 


 1.5 x10^3/uL


(1.0-4.8)


 


Monocytes # (Auto)


 


 


 


 0.4 x10^3/uL


(0.0-1.1)


 


Eosinophils # (Auto)


 


 


 


 0.2 x10^3/uL


(0.0-0.7)


 


Basophils # (Auto)


 


 


 


 0.0 x10^3/uL


(0.0-0.2)


 


Sodium Level


 


 


 


 154 mmol/L


(136-145)


 


Potassium Level


 


 


 


 2.9 mmol/L


(3.5-5.1)


 


Chloride Level


 


 


 


 111 mmol/L


()


 


Carbon Dioxide Level


 


 


 


 34 mmol/L


(21-32)


 


Anion Gap    9 (6-14) 


 


Blood Urea Nitrogen


 


 


 


 45 mg/dL


(7-20)


 


Creatinine


 


 


 


 1.0 mg/dL


(0.6-1.0)


 


Estimated GFR


(Cockcroft-Gault) 


 


 


 58.9 





 


Glucose Level


 


 


 


 153 mg/dL


(70-99)


 


Calcium Level


 


 


 


 8.6 mg/dL


(8.5-10.1)


 


Phosphorus Level


 


 


 


 3.6 mg/dL


(2.6-4.7)


 


Magnesium Level


 


 


 


 1.8 mg/dL


(1.8-2.4)


 


Test


 5/2/20


05:48 


 


 





 


Glucose (Fingerstick)


 149 mg/dL


(70-99) 


 


 











Medications





Active Scripts








 Medications  Dose


 Route/Sig


 Max Daily Dose Days Date Category


 


 Bisoprolol


 Fumarate 5 Mg


 Tablet  10 Mg


 PO DAILY


   3/16/20 Reported








Comments


cxr reviewed.





Impression


.


IMPRESSION:


1.  Acute hypoxemic respiratory failure secondary to ARDS status post trach,


2.  Gallstone pancreatitis


3.  Severe metabolic acidosis.stable


4.  Acute kidney injury-stable, ON HD-- continue to improve 


5.  Acute gallstone pancreatitis.


6.  Hypoalbuminemia.


7.  Moderate persistent effusions


8.  Fever-  Per ID, per surgery


9.  Chronic anemia


10. Covid 19 testing negative


11. Moderate to large ascites-S/P paracentisis


S/P paracentisis with 4 liters removed on 4/15/20














Surgery note


Operative Note:


After obtaining informed consent, patient was taken to OR, induced under GETA 

and prepped in the usual fashion.  5 mm port placed umbilical and right mid 

abdomen, all under laparoscopic guidance.  Large amount of ascites encountered 

and aspirated off.  Fluid was clear with some whitish debris.  Viscera was 

completely locked in with obliteration of all planes, preventing any significant

exploration.  Copious irrigation.





Given patient's overall clinical improvement, favor against open procedure and 

attempt at cholecystectomy and/or necrosectomy, given high risk of 

complications.  Cholecystectomy will need to be performed, but favor waiting 3 

months.





19 ROBERT drain placed and secured with 3 0 nylon.  Skin repaired with 4 0 monocryl.

 Dressing placed.





Patient tolerated procedure well and sent to PACU in stable condition.  All 

counts correct.  Wound class is 4.





Plan


.


We will continue our efforts at weaning


 TS trials, 24 hrs as tolerated/ PM valve


precedex for anxiety, prn


Follow surgery input


hep sq and protonix for prophylaxis


Follow ID rec, abx per id


Follow nephrology recs 


Nutritional support per surgery


continue TPN for nutrition 


DVT/GI PPX 


d/w RN/RT











SHARYN SOLORZANO MD                  May 2, 2020 09:55

## 2020-05-02 NOTE — NUR
Pharmacy TPN Dosing Note



S: JESENIA RICH is a 49 year old F Currently receiving Central Continuous TPN started 
03/18/20



B:Pertinent PMH: Necrotizing pancreatitis

Height: 5 feet, 8 inches

Weight: 106.1 kg



Current diet: NPO 



LABS:

Sodium:    154 

Potassium: 2.9 

Chloride:  111 

Calcium:   8.6 

Corrected Calcium: 9.64 

Magnesium: 1.8 

CO2:       34 

SCr:       1 

Glucose:   149-153 

Albumin:   2.7 

AST:       21 

ALT:       11 



TPN FORMULA:

TPN TYPE:  Central Continuous

AMINO ACIDS:         90 gm

DEXTROSE:            225 gm

LIPIDS:              30 gm

POTASSIUM CHLORIDE:  75 mEq

MAGNESIUM:           20 mEq

CALCIUM:             10 mEq

INSULIN:             30 units

MULTIPLE VITAMIN:    10 ml

TRACE ELEMENTS:      0.5 ml



TPN PLAN:  

-Change KAC to KCl - increase to 75 mEq/day for low potassium. Receiving IVPB replacement 
per primary.

-Serum Na still elevated, hesitant to increase rate further due to pt requiring IV Bumex due 
to anasarca with 3rd spacing.

-BMP tomorrow.





R: Continue TPN @ current rate and with above formula.

Will monitor electrolytes, glucose, and tolerance to TPN.



 VALERIE SNELL [facility], 05/02/20 1117

## 2020-05-02 NOTE — PDOC
SURGICAL PROGRESS NOTE


Subjective


Patient awake and alert able to answer questions somewhat confused denies any 

abdominal pain


Vital Signs





Vital Signs








  Date Time  Temp Pulse Resp B/P (MAP) Pulse Ox O2 Delivery O2 Flow Rate FiO2


 


5/2/20 07:33     97 Ventilator  


 


5/2/20 06:00  86 25 104/52 (69)    


 


5/2/20 04:00 99.6       





 99.6       


 


5/2/20 00:00        








I&O











Intake and Output 


 


 5/2/20





 07:00


 


Intake Total 3127.0 ml


 


Output Total 4170 ml


 


Balance -1043.0 ml


 


 


 


IV Total 3127.0 ml


 


Output Urine Total 4085 ml


 


Drainage Total 85 ml








PATIENT HAS A VILLASENOR:  Yes


General:  Alert, Cooperative, mild distress


Lungs:  Clear to auscultation


Heart:  Regular rate, No murmurs


Abdomen:  Normal bowel sounds, Soft, Other (Mildly tender in epigastrium no 

peritoneal signs)


Extremities:  No edema


Labs





Laboratory Tests








Test


 4/30/20


12:10 4/30/20


17:26 5/1/20


00:59 5/1/20


06:27


 


Glucose (Fingerstick)


 169 mg/dL


(70-99) 152 mg/dL


(70-99) 152 mg/dL


(70-99) 139 mg/dL


(70-99)


 


Test


 5/1/20


06:30 5/1/20


12:20 5/1/20


17:31 5/1/20


23:34


 


White Blood Count


 14.7 x10^3/uL


(4.0-11.0) 


 


 





 


Red Blood Count


 2.78 x10^6/uL


(3.50-5.40) 


 


 





 


Hemoglobin


 8.1 g/dL


(12.0-15.5) 


 


 





 


Hematocrit


 25.3 %


(36.0-47.0) 


 


 





 


Mean Corpuscular Volume 91 fL ()    


 


Mean Corpuscular Hemoglobin 29 pg (25-35)    


 


Mean Corpuscular Hemoglobin


Concent 32 g/dL


(31-37) 


 


 





 


Red Cell Distribution Width


 18.7 %


(11.5-14.5) 


 


 





 


Platelet Count


 442 x10^3/uL


(140-400) 


 


 





 


Neutrophils (%) (Auto) 80 % (31-73)    


 


Lymphocytes (%) (Auto) 15 % (24-48)    


 


Monocytes (%) (Auto) 4 % (0-9)    


 


Eosinophils (%) (Auto) 1 % (0-3)    


 


Basophils (%) (Auto) 0 % (0-3)    


 


Neutrophils # (Auto)


 11.7 x10^3/uL


(1.8-7.7) 


 


 





 


Lymphocytes # (Auto)


 2.2 x10^3/uL


(1.0-4.8) 


 


 





 


Monocytes # (Auto)


 0.5 x10^3/uL


(0.0-1.1) 


 


 





 


Eosinophils # (Auto)


 0.1 x10^3/uL


(0.0-0.7) 


 


 





 


Basophils # (Auto)


 0.0 x10^3/uL


(0.0-0.2) 


 


 





 


Sodium Level


 153 mmol/L


(136-145) 


 


 





 


Potassium Level


 3.3 mmol/L


(3.5-5.1) 


 


 





 


Chloride Level


 111 mmol/L


() 


 


 





 


Carbon Dioxide Level


 33 mmol/L


(21-32) 


 


 





 


Anion Gap 9 (6-14)    


 


Blood Urea Nitrogen


 47 mg/dL


(7-20) 


 


 





 


Creatinine


 0.9 mg/dL


(0.6-1.0) 


 


 





 


Estimated GFR


(Cockcroft-Gault) 66.5 


 


 


 





 


Glucose Level


 150 mg/dL


(70-99) 


 


 





 


Calcium Level


 8.7 mg/dL


(8.5-10.1) 


 


 





 


Phosphorus Level


 4.6 mg/dL


(2.6-4.7) 


 


 





 


Magnesium Level


 1.8 mg/dL


(1.8-2.4) 


 


 





 


Glucose (Fingerstick)


 


 147 mg/dL


(70-99) 140 mg/dL


(70-99) 134 mg/dL


(70-99)


 


Test


 5/2/20


05:45 5/2/20


05:48 


 





 


White Blood Count


 9.7 x10^3/uL


(4.0-11.0) 


 


 





 


Red Blood Count


 2.46 x10^6/uL


(3.50-5.40) 


 


 





 


Hemoglobin


 7.2 g/dL


(12.0-15.5) 


 


 





 


Hematocrit


 22.4 %


(36.0-47.0) 


 


 





 


Mean Corpuscular Volume 91 fL ()    


 


Mean Corpuscular Hemoglobin 29 pg (25-35)    


 


Mean Corpuscular Hemoglobin


Concent 32 g/dL


(31-37) 


 


 





 


Red Cell Distribution Width


 18.1 %


(11.5-14.5) 


 


 





 


Platelet Count


 419 x10^3/uL


(140-400) 


 


 





 


Neutrophils (%) (Auto) 77 % (31-73)    


 


Lymphocytes (%) (Auto) 16 % (24-48)    


 


Monocytes (%) (Auto) 5 % (0-9)    


 


Eosinophils (%) (Auto) 2 % (0-3)    


 


Basophils (%) (Auto) 0 % (0-3)    


 


Neutrophils # (Auto)


 7.4 x10^3/uL


(1.8-7.7) 


 


 





 


Lymphocytes # (Auto)


 1.5 x10^3/uL


(1.0-4.8) 


 


 





 


Monocytes # (Auto)


 0.4 x10^3/uL


(0.0-1.1) 


 


 





 


Eosinophils # (Auto)


 0.2 x10^3/uL


(0.0-0.7) 


 


 





 


Basophils # (Auto)


 0.0 x10^3/uL


(0.0-0.2) 


 


 





 


Sodium Level


 154 mmol/L


(136-145) 


 


 





 


Potassium Level


 2.9 mmol/L


(3.5-5.1) 


 


 





 


Chloride Level


 111 mmol/L


() 


 


 





 


Carbon Dioxide Level


 34 mmol/L


(21-32) 


 


 





 


Anion Gap 9 (6-14)    


 


Blood Urea Nitrogen


 45 mg/dL


(7-20) 


 


 





 


Creatinine


 1.0 mg/dL


(0.6-1.0) 


 


 





 


Estimated GFR


(Cockcroft-Gault) 58.9 


 


 


 





 


Glucose Level


 153 mg/dL


(70-99) 


 


 





 


Calcium Level


 8.6 mg/dL


(8.5-10.1) 


 


 





 


Phosphorus Level


 3.6 mg/dL


(2.6-4.7) 


 


 





 


Magnesium Level


 1.8 mg/dL


(1.8-2.4) 


 


 





 


Glucose (Fingerstick)


 


 149 mg/dL


(70-99) 


 











Laboratory Tests








Test


 5/1/20


12:20 5/1/20


17:31 5/1/20


23:34 5/2/20


05:45


 


Glucose (Fingerstick)


 147 mg/dL


(70-99) 140 mg/dL


(70-99) 134 mg/dL


(70-99) 





 


White Blood Count


 


 


 


 9.7 x10^3/uL


(4.0-11.0)


 


Red Blood Count


 


 


 


 2.46 x10^6/uL


(3.50-5.40)


 


Hemoglobin


 


 


 


 7.2 g/dL


(12.0-15.5)


 


Hematocrit


 


 


 


 22.4 %


(36.0-47.0)


 


Mean Corpuscular Volume    91 fL () 


 


Mean Corpuscular Hemoglobin    29 pg (25-35) 


 


Mean Corpuscular Hemoglobin


Concent 


 


 


 32 g/dL


(31-37)


 


Red Cell Distribution Width


 


 


 


 18.1 %


(11.5-14.5)


 


Platelet Count


 


 


 


 419 x10^3/uL


(140-400)


 


Neutrophils (%) (Auto)    77 % (31-73) 


 


Lymphocytes (%) (Auto)    16 % (24-48) 


 


Monocytes (%) (Auto)    5 % (0-9) 


 


Eosinophils (%) (Auto)    2 % (0-3) 


 


Basophils (%) (Auto)    0 % (0-3) 


 


Neutrophils # (Auto)


 


 


 


 7.4 x10^3/uL


(1.8-7.7)


 


Lymphocytes # (Auto)


 


 


 


 1.5 x10^3/uL


(1.0-4.8)


 


Monocytes # (Auto)


 


 


 


 0.4 x10^3/uL


(0.0-1.1)


 


Eosinophils # (Auto)


 


 


 


 0.2 x10^3/uL


(0.0-0.7)


 


Basophils # (Auto)


 


 


 


 0.0 x10^3/uL


(0.0-0.2)


 


Sodium Level


 


 


 


 154 mmol/L


(136-145)


 


Potassium Level


 


 


 


 2.9 mmol/L


(3.5-5.1)


 


Chloride Level


 


 


 


 111 mmol/L


()


 


Carbon Dioxide Level


 


 


 


 34 mmol/L


(21-32)


 


Anion Gap    9 (6-14) 


 


Blood Urea Nitrogen


 


 


 


 45 mg/dL


(7-20)


 


Creatinine


 


 


 


 1.0 mg/dL


(0.6-1.0)


 


Estimated GFR


(Cockcroft-Gault) 


 


 


 58.9 





 


Glucose Level


 


 


 


 153 mg/dL


(70-99)


 


Calcium Level


 


 


 


 8.6 mg/dL


(8.5-10.1)


 


Phosphorus Level


 


 


 


 3.6 mg/dL


(2.6-4.7)


 


Magnesium Level


 


 


 


 1.8 mg/dL


(1.8-2.4)


 


Test


 5/2/20


05:48 


 


 





 


Glucose (Fingerstick)


 149 mg/dL


(70-99) 


 


 











Problem List


Problems


Medical Problems:


(1) Acute pancreatitis


Status: Acute  





(2) Cholelithiasis


Status: Acute  








Assessment/Plan


Pancreatitis, continue conservative therapy TPN


We will remove NG tube











OVIDIO MEDINA MD              May 2, 2020 08:48

## 2020-05-03 VITALS — SYSTOLIC BLOOD PRESSURE: 115 MMHG | DIASTOLIC BLOOD PRESSURE: 65 MMHG

## 2020-05-03 VITALS — DIASTOLIC BLOOD PRESSURE: 85 MMHG | SYSTOLIC BLOOD PRESSURE: 181 MMHG

## 2020-05-03 VITALS — DIASTOLIC BLOOD PRESSURE: 44 MMHG | SYSTOLIC BLOOD PRESSURE: 84 MMHG

## 2020-05-03 VITALS — SYSTOLIC BLOOD PRESSURE: 172 MMHG | DIASTOLIC BLOOD PRESSURE: 74 MMHG

## 2020-05-03 VITALS — SYSTOLIC BLOOD PRESSURE: 119 MMHG | DIASTOLIC BLOOD PRESSURE: 56 MMHG

## 2020-05-03 VITALS — DIASTOLIC BLOOD PRESSURE: 49 MMHG | SYSTOLIC BLOOD PRESSURE: 95 MMHG

## 2020-05-03 VITALS — DIASTOLIC BLOOD PRESSURE: 78 MMHG | SYSTOLIC BLOOD PRESSURE: 152 MMHG

## 2020-05-03 VITALS — DIASTOLIC BLOOD PRESSURE: 36 MMHG | SYSTOLIC BLOOD PRESSURE: 75 MMHG

## 2020-05-03 VITALS — SYSTOLIC BLOOD PRESSURE: 99 MMHG | DIASTOLIC BLOOD PRESSURE: 59 MMHG

## 2020-05-03 VITALS — SYSTOLIC BLOOD PRESSURE: 112 MMHG | DIASTOLIC BLOOD PRESSURE: 56 MMHG

## 2020-05-03 VITALS — DIASTOLIC BLOOD PRESSURE: 69 MMHG | SYSTOLIC BLOOD PRESSURE: 137 MMHG

## 2020-05-03 VITALS — DIASTOLIC BLOOD PRESSURE: 74 MMHG | SYSTOLIC BLOOD PRESSURE: 134 MMHG

## 2020-05-03 VITALS — SYSTOLIC BLOOD PRESSURE: 99 MMHG | DIASTOLIC BLOOD PRESSURE: 48 MMHG

## 2020-05-03 VITALS — SYSTOLIC BLOOD PRESSURE: 89 MMHG | DIASTOLIC BLOOD PRESSURE: 39 MMHG

## 2020-05-03 VITALS — SYSTOLIC BLOOD PRESSURE: 140 MMHG | DIASTOLIC BLOOD PRESSURE: 71 MMHG

## 2020-05-03 VITALS — SYSTOLIC BLOOD PRESSURE: 153 MMHG | DIASTOLIC BLOOD PRESSURE: 81 MMHG

## 2020-05-03 VITALS — SYSTOLIC BLOOD PRESSURE: 166 MMHG | DIASTOLIC BLOOD PRESSURE: 78 MMHG

## 2020-05-03 VITALS — SYSTOLIC BLOOD PRESSURE: 157 MMHG | DIASTOLIC BLOOD PRESSURE: 75 MMHG

## 2020-05-03 VITALS — SYSTOLIC BLOOD PRESSURE: 104 MMHG | DIASTOLIC BLOOD PRESSURE: 53 MMHG

## 2020-05-03 LAB
ANION GAP SERPL CALC-SCNC: 1 MMOL/L (ref 6–14)
BASE EXCESS STD BLDA CALC-SCNC: 10 MMOL/L (ref -3–3)
BASE EXCESS STD BLDA CALC-SCNC: 6 MMOL/L (ref -3–3)
BASOPHILS # BLD AUTO: 0 X10^3/UL (ref 0–0.2)
BASOPHILS NFR BLD: 0 % (ref 0–3)
BUN SERPL-MCNC: 47 MG/DL (ref 7–20)
CALCIUM SERPL-MCNC: 8.7 MG/DL (ref 8.5–10.1)
CHLORIDE SERPL-SCNC: 114 MMOL/L (ref 98–107)
CO2 SERPL-SCNC: 38 MMOL/L (ref 21–32)
CREAT SERPL-MCNC: 1 MG/DL (ref 0.6–1)
EOSINOPHIL NFR BLD: 0.2 X10^3/UL (ref 0–0.7)
EOSINOPHIL NFR BLD: 2 % (ref 0–3)
ERYTHROCYTE [DISTWIDTH] IN BLOOD BY AUTOMATED COUNT: 18.1 % (ref 11.5–14.5)
GFR SERPLBLD BASED ON 1.73 SQ M-ARVRAT: 58.9 ML/MIN
GLUCOSE SERPL-MCNC: 125 MG/DL (ref 70–99)
HCO3 BLDA-SCNC: 34 MMOL/L (ref 21–28)
HCO3 BLDA-SCNC: 35 MMOL/L (ref 21–28)
HCT VFR BLD CALC: 22.8 % (ref 36–47)
HGB BLD-MCNC: 7.1 G/DL (ref 12–15.5)
LYMPHOCYTES # BLD: 1.4 X10^3/UL (ref 1–4.8)
LYMPHOCYTES NFR BLD AUTO: 14 % (ref 24–48)
MCH RBC QN AUTO: 29 PG (ref 25–35)
MCHC RBC AUTO-ENTMCNC: 31 G/DL (ref 31–37)
MCV RBC AUTO: 93 FL (ref 79–100)
METHGB MFR BLD: 0.4 % (ref 0–1.9)
METHGB MFR BLD: 0.5 % (ref 0–1.9)
MONO #: 0.5 X10^3/UL (ref 0–1.1)
MONOCYTES NFR BLD: 5 % (ref 0–9)
NEUT #: 7.7 X10^3/UL (ref 1.8–7.7)
NEUTROPHILS NFR BLD AUTO: 79 % (ref 31–73)
OXYHGB MFR BLD: 92.7 %
OXYHGB MFR BLD: 96.8 %
PCO2 BLDA: 49 MMHG (ref 35–46)
PCO2 BLDA: 75 MMHG (ref 35–46)
PLATELET # BLD AUTO: 408 X10^3/UL (ref 140–400)
PO2 BLDA: 123 MMHG (ref 75–108)
PO2 BLDA: 73 MMHG (ref 75–108)
POTASSIUM SERPL-SCNC: 4.3 MMOL/L (ref 3.5–5.1)
RBC # BLD AUTO: 2.44 X10^6/UL (ref 3.5–5.4)
SAO2 % BLDA: 93 % (ref 92–99)
SAO2 % BLDA: 97 % (ref 92–99)
SODIUM SERPL-SCNC: 153 MMOL/L (ref 136–145)
WBC # BLD AUTO: 9.7 X10^3/UL (ref 4–11)

## 2020-05-03 RX ADMIN — PANTOPRAZOLE SODIUM SCH MG: 40 INJECTION, POWDER, FOR SOLUTION INTRAVENOUS at 09:19

## 2020-05-03 RX ADMIN — INSULIN LISPRO SCH UNITS: 100 INJECTION, SOLUTION INTRAVENOUS; SUBCUTANEOUS at 05:27

## 2020-05-03 RX ADMIN — HEPARIN SODIUM SCH UNIT: 5000 INJECTION, SOLUTION INTRAVENOUS; SUBCUTANEOUS at 21:12

## 2020-05-03 RX ADMIN — ONDANSETRON PRN MG: 2 INJECTION INTRAMUSCULAR; INTRAVENOUS at 20:18

## 2020-05-03 RX ADMIN — HEPARIN SODIUM SCH UNIT: 5000 INJECTION, SOLUTION INTRAVENOUS; SUBCUTANEOUS at 12:41

## 2020-05-03 RX ADMIN — PROCHLORPERAZINE EDISYLATE PRN MG: 5 INJECTION INTRAMUSCULAR; INTRAVENOUS at 18:13

## 2020-05-03 RX ADMIN — INSULIN LISPRO SCH UNITS: 100 INJECTION, SOLUTION INTRAVENOUS; SUBCUTANEOUS at 00:00

## 2020-05-03 RX ADMIN — INSULIN LISPRO SCH UNITS: 100 INJECTION, SOLUTION INTRAVENOUS; SUBCUTANEOUS at 18:00

## 2020-05-03 RX ADMIN — MEROPENEM SCH MLS/HR: 1 INJECTION, POWDER, FOR SOLUTION INTRAVENOUS at 05:27

## 2020-05-03 RX ADMIN — Medication PRN EACH: at 12:44

## 2020-05-03 RX ADMIN — INSULIN LISPRO SCH UNITS: 100 INJECTION, SOLUTION INTRAVENOUS; SUBCUTANEOUS at 12:00

## 2020-05-03 RX ADMIN — BACITRACIN SCH MLS/HR: 5000 INJECTION, POWDER, FOR SOLUTION INTRAMUSCULAR at 13:37

## 2020-05-03 RX ADMIN — BACITRACIN SCH MLS/HR: 5000 INJECTION, POWDER, FOR SOLUTION INTRAMUSCULAR at 19:49

## 2020-05-03 RX ADMIN — ONDANSETRON PRN MG: 2 INJECTION INTRAMUSCULAR; INTRAVENOUS at 09:20

## 2020-05-03 RX ADMIN — MEROPENEM SCH MLS/HR: 1 INJECTION, POWDER, FOR SOLUTION INTRAVENOUS at 16:22

## 2020-05-03 RX ADMIN — DEXMEDETOMIDINE HYDROCHLORIDE PRN MLS/HR: 100 INJECTION, SOLUTION, CONCENTRATE INTRAVENOUS at 20:15

## 2020-05-03 RX ADMIN — ACETAMINOPHEN PRN MG: 650 SUPPOSITORY RECTAL at 23:26

## 2020-05-03 RX ADMIN — DEXMEDETOMIDINE HYDROCHLORIDE PRN MLS/HR: 100 INJECTION, SOLUTION, CONCENTRATE INTRAVENOUS at 05:29

## 2020-05-03 RX ADMIN — DEXMEDETOMIDINE HYDROCHLORIDE PRN MLS/HR: 100 INJECTION, SOLUTION, CONCENTRATE INTRAVENOUS at 02:25

## 2020-05-03 RX ADMIN — ONDANSETRON PRN MG: 2 INJECTION INTRAMUSCULAR; INTRAVENOUS at 16:18

## 2020-05-03 RX ADMIN — PROCHLORPERAZINE EDISYLATE PRN MG: 5 INJECTION INTRAMUSCULAR; INTRAVENOUS at 13:01

## 2020-05-03 RX ADMIN — DEXMEDETOMIDINE HYDROCHLORIDE PRN MLS/HR: 100 INJECTION, SOLUTION, CONCENTRATE INTRAVENOUS at 12:31

## 2020-05-03 RX ADMIN — MEROPENEM SCH MLS/HR: 1 INJECTION, POWDER, FOR SOLUTION INTRAVENOUS at 21:30

## 2020-05-03 NOTE — PDOC
PULMONARY PROGRESS NOTES


Subjective


Patient intubated on 3/23 , s/p trach 4/6, remained off vent on PMV with trach 

shield. 


Appears in mild distress this am. ABG with worsening hypercapnia/ on PCA 

dilaudid


Vitals





Vital Signs








  Date Time  Temp Pulse Resp B/P (MAP) Pulse Ox O2 Delivery O2 Flow Rate FiO2


 


5/3/20 06:00  103 25 115/65 (82) 100 Tracheal Collar  


 


5/3/20 04:00 97.7       





 97.7       


 


5/2/20 16:38       10.0 








ROS:  No Nausea, No Chest Pain, No Abdominal Pain, No Increase Cough


General:  Alert, Oriented X4, No acute distress


HEENT:  Other (nc at perrl   nose clear  nech  trach site ok  no lad   no thyrom

egaly)


Lungs:  Crackles


Cardiovascular:  S1, S2


Abdomen:  Soft, Non-tender, Other (firm)


Neuro Exam:  Alert


Extremities:  Other (+3 generalized edema )


Skin:  Warm, Dry


Labs





Laboratory Tests








Test


 5/1/20


12:20 5/1/20


17:31 5/1/20


23:34 5/2/20


05:45


 


Glucose (Fingerstick)


 147 mg/dL


(70-99) 140 mg/dL


(70-99) 134 mg/dL


(70-99) 





 


White Blood Count


 


 


 


 9.7 x10^3/uL


(4.0-11.0)


 


Red Blood Count


 


 


 


 2.46 x10^6/uL


(3.50-5.40)


 


Hemoglobin


 


 


 


 7.2 g/dL


(12.0-15.5)


 


Hematocrit


 


 


 


 22.4 %


(36.0-47.0)


 


Mean Corpuscular Volume    91 fL () 


 


Mean Corpuscular Hemoglobin    29 pg (25-35) 


 


Mean Corpuscular Hemoglobin


Concent 


 


 


 32 g/dL


(31-37)


 


Red Cell Distribution Width


 


 


 


 18.1 %


(11.5-14.5)


 


Platelet Count


 


 


 


 419 x10^3/uL


(140-400)


 


Neutrophils (%) (Auto)    77 % (31-73) 


 


Lymphocytes (%) (Auto)    16 % (24-48) 


 


Monocytes (%) (Auto)    5 % (0-9) 


 


Eosinophils (%) (Auto)    2 % (0-3) 


 


Basophils (%) (Auto)    0 % (0-3) 


 


Neutrophils # (Auto)


 


 


 


 7.4 x10^3/uL


(1.8-7.7)


 


Lymphocytes # (Auto)


 


 


 


 1.5 x10^3/uL


(1.0-4.8)


 


Monocytes # (Auto)


 


 


 


 0.4 x10^3/uL


(0.0-1.1)


 


Eosinophils # (Auto)


 


 


 


 0.2 x10^3/uL


(0.0-0.7)


 


Basophils # (Auto)


 


 


 


 0.0 x10^3/uL


(0.0-0.2)


 


Sodium Level


 


 


 


 154 mmol/L


(136-145)


 


Potassium Level


 


 


 


 2.9 mmol/L


(3.5-5.1)


 


Chloride Level


 


 


 


 111 mmol/L


()


 


Carbon Dioxide Level


 


 


 


 34 mmol/L


(21-32)


 


Anion Gap    9 (6-14) 


 


Blood Urea Nitrogen


 


 


 


 45 mg/dL


(7-20)


 


Creatinine


 


 


 


 1.0 mg/dL


(0.6-1.0)


 


Estimated GFR


(Cockcroft-Gault) 


 


 


 58.9 





 


Glucose Level


 


 


 


 153 mg/dL


(70-99)


 


Calcium Level


 


 


 


 8.6 mg/dL


(8.5-10.1)


 


Phosphorus Level


 


 


 


 3.6 mg/dL


(2.6-4.7)


 


Magnesium Level


 


 


 


 1.8 mg/dL


(1.8-2.4)


 


Test


 5/2/20


05:48 5/2/20


13:34 5/2/20


18:06 5/3/20


00:03


 


Glucose (Fingerstick)


 149 mg/dL


(70-99) 146 mg/dL


(70-99) 135 mg/dL


(70-99) 122 mg/dL


(70-99)


 


Test


 5/3/20


05:25 5/3/20


05:30 


 





 


Glucose (Fingerstick)


 113 mg/dL


(70-99) 


 


 





 


White Blood Count


 


 9.7 x10^3/uL


(4.0-11.0) 


 





 


Red Blood Count


 


 2.44 x10^6/uL


(3.50-5.40) 


 





 


Hemoglobin


 


 7.1 g/dL


(12.0-15.5) 


 





 


Hematocrit


 


 22.8 %


(36.0-47.0) 


 





 


Mean Corpuscular Volume  93 fL ()   


 


Mean Corpuscular Hemoglobin  29 pg (25-35)   


 


Mean Corpuscular Hemoglobin


Concent 


 31 g/dL


(31-37) 


 





 


Red Cell Distribution Width


 


 18.1 %


(11.5-14.5) 


 





 


Platelet Count


 


 408 x10^3/uL


(140-400) 


 





 


Neutrophils (%) (Auto)  79 % (31-73)   


 


Lymphocytes (%) (Auto)  14 % (24-48)   


 


Monocytes (%) (Auto)  5 % (0-9)   


 


Eosinophils (%) (Auto)  2 % (0-3)   


 


Basophils (%) (Auto)  0 % (0-3)   


 


Neutrophils # (Auto)


 


 7.7 x10^3/uL


(1.8-7.7) 


 





 


Lymphocytes # (Auto)


 


 1.4 x10^3/uL


(1.0-4.8) 


 





 


Monocytes # (Auto)


 


 0.5 x10^3/uL


(0.0-1.1) 


 





 


Eosinophils # (Auto)


 


 0.2 x10^3/uL


(0.0-0.7) 


 





 


Basophils # (Auto)


 


 0.0 x10^3/uL


(0.0-0.2) 


 





 


Sodium Level


 


 153 mmol/L


(136-145) 


 





 


Potassium Level


 


 4.3 mmol/L


(3.5-5.1) 


 





 


Chloride Level


 


 114 mmol/L


() 


 





 


Carbon Dioxide Level


 


 38 mmol/L


(21-32) 


 





 


Anion Gap  1 (6-14)   


 


Blood Urea Nitrogen


 


 47 mg/dL


(7-20) 


 





 


Creatinine


 


 1.0 mg/dL


(0.6-1.0) 


 





 


Estimated GFR


(Cockcroft-Gault) 


 58.9 


 


 





 


Glucose Level


 


 125 mg/dL


(70-99) 


 





 


Calcium Level


 


 8.7 mg/dL


(8.5-10.1) 


 











Laboratory Tests








Test


 5/2/20


13:34 5/2/20


18:06 5/3/20


00:03 5/3/20


05:25


 


Glucose (Fingerstick)


 146 mg/dL


(70-99) 135 mg/dL


(70-99) 122 mg/dL


(70-99) 113 mg/dL


(70-99)


 


Test


 5/3/20


05:30 


 


 





 


White Blood Count


 9.7 x10^3/uL


(4.0-11.0) 


 


 





 


Red Blood Count


 2.44 x10^6/uL


(3.50-5.40) 


 


 





 


Hemoglobin


 7.1 g/dL


(12.0-15.5) 


 


 





 


Hematocrit


 22.8 %


(36.0-47.0) 


 


 





 


Mean Corpuscular Volume 93 fL ()    


 


Mean Corpuscular Hemoglobin 29 pg (25-35)    


 


Mean Corpuscular Hemoglobin


Concent 31 g/dL


(31-37) 


 


 





 


Red Cell Distribution Width


 18.1 %


(11.5-14.5) 


 


 





 


Platelet Count


 408 x10^3/uL


(140-400) 


 


 





 


Neutrophils (%) (Auto) 79 % (31-73)    


 


Lymphocytes (%) (Auto) 14 % (24-48)    


 


Monocytes (%) (Auto) 5 % (0-9)    


 


Eosinophils (%) (Auto) 2 % (0-3)    


 


Basophils (%) (Auto) 0 % (0-3)    


 


Neutrophils # (Auto)


 7.7 x10^3/uL


(1.8-7.7) 


 


 





 


Lymphocytes # (Auto)


 1.4 x10^3/uL


(1.0-4.8) 


 


 





 


Monocytes # (Auto)


 0.5 x10^3/uL


(0.0-1.1) 


 


 





 


Eosinophils # (Auto)


 0.2 x10^3/uL


(0.0-0.7) 


 


 





 


Basophils # (Auto)


 0.0 x10^3/uL


(0.0-0.2) 


 


 





 


Sodium Level


 153 mmol/L


(136-145) 


 


 





 


Potassium Level


 4.3 mmol/L


(3.5-5.1) 


 


 





 


Chloride Level


 114 mmol/L


() 


 


 





 


Carbon Dioxide Level


 38 mmol/L


(21-32) 


 


 





 


Anion Gap 1 (6-14)    


 


Blood Urea Nitrogen


 47 mg/dL


(7-20) 


 


 





 


Creatinine


 1.0 mg/dL


(0.6-1.0) 


 


 





 


Estimated GFR


(Cockcroft-Gault) 58.9 


 


 


 





 


Glucose Level


 125 mg/dL


(70-99) 


 


 





 


Calcium Level


 8.7 mg/dL


(8.5-10.1) 


 


 











Medications





Active Scripts








 Medications  Dose


 Route/Sig


 Max Daily Dose Days Date Category


 


 Bisoprolol


 Fumarate 5 Mg


 Tablet  10 Mg


 PO DAILY


   3/16/20 Reported











Impression


.


IMPRESSION:


1.  Acute hypoxemic respiratory failure secondary to ARDS status post trach,


2.  Gallstone pancreatitis


3.  Severe metabolic acidosis.stable


4.  Acute kidney injury-stable, ON HD-- continue to improve 


5.  Acute gallstone pancreatitis.


6.  Hypoalbuminemia.


7.  Moderate persistent effusions


8.  Fever-  Per ID, per surgery


9.  Chronic anemia


10. Covid 19 testing negative


11. Moderate to large ascites-S/P paracentisis


12.S/P paracentisis with 4 liters removed on 4/15/20














Surgery note


Operative Note:


After obtaining informed consent, patient was taken to OR, induced under GETA 

and prepped in the usual fashion.  5 mm port placed umbilical and right mid 

abdomen, all under laparoscopic guidance.  Large amount of ascites encountered 

and aspirated off.  Fluid was clear with some whitish debris.  Viscera was 

completely locked in with obliteration of all planes, preventing any significant

exploration.  Copious irrigation.





Given patient's overall clinical improvement, favor against open procedure and a

ttempt at cholecystectomy and/or necrosectomy, given high risk of complications.

 Cholecystectomy will need to be performed, but favor waiting 3 months.





19 ROBERT drain placed and secured with 3 0 nylon.  Skin repaired with 4 0 monocryl.

 Dressing placed.





Patient tolerated procedure well and sent to PACU in stable condition.  All 

counts correct.  Wound class is 4.





Plan


.


We will continue our efforts at weaning, Developed worsening hypercapnia this am

due to dilaudid PCA. d/w RN / RT.resting on AC. reduce dilaudid PCA dose. re-

start TS with PM . 


TS trials, 24 hrs as tolerated/ PM valve after reducing dilaudid dose


precedex for anxiety, prn


Follow surgery input


Follow ID rec, abx per id


Follow nephrology recs 


Nutritional support per surgery: continue TPN for nutrition 


DVT/GI PPX : heparin Sq/ protonix


 


d/w RN/RT





CODE:FULL











SHARYN SOLORZANO MD                  May 3, 2020 07:44

## 2020-05-03 NOTE — NUR
Pharmacy TPN Dosing Note



S: JESENIA RICH is a 49 year old F Currently receiving Central Continuous TPN started 
03/18/20



B:Pertinent PMH: Necrotizing pancreatitis



Height: 5 feet, 8 inches

Weight: 103.1 kg



Current diet: NPO 



LABS:

Sodium:    153 

Potassium: 4.3 

Chloride:  114 

Calcium:   8.7 

Corrected Calcium: 9.74 

Magnesium: 1.8 

CO2:       38 

SCr:       1.0 

Glucose:   113-125 

Albumin:   2.7 

AST:       21 

ALT:       11 



TPN FORMULA:

TPN TYPE:  Central Continuous

AMINO ACIDS:         90 gm

DEXTROSE:            225 gm

LIPIDS:              30 gm

POTASSIUM CHLORIDE:  75 mEq

MAGNESIUM:           20 mEq

CALCIUM:             10 mEq

INSULIN:             25 units

MULTIPLE VITAMIN:    10 ml

TRACE ELEMENTS:      0.5 ml



TPN PLAN:  

-Electrolytes WNL - continue same lytes in TPN.

-Blood glucose below goal range of 140-180 for ICU patients. Reduce TPN regular insulin to 
25 units/day.

-CMP, mag, phos, triglycerides tomorrow.





R: Continue TPN @ current rate and above formula. 

Will monitor electrolytes, glucose, and tolerance to TPN.



 VALERIE SNELL [facility], 05/03/20 [id number]

## 2020-05-03 NOTE — PDOC
PROGRESS NOTES


Chief Complaint


Chief Complaint


Acute hypoxic Respiratory failure requiring mechanical ventilation (on vent 

since 3/23)


Tracheostomy


bilateral pleural effusions/pulm edema 


Sepsis


Severe Acute gallstone pancreatitis (not a surgical candidate at this time) with

necrosis


Acute kidney failure now requiring dialysis


Salpingitis


Gallstones (Calculus of gallbladder with acute cholecystitis without 

obstruction)


HTN 


Leukocytosis 


Hypoxia


Uterine fibroid


Intractable pain


Intractable nausea


Covid 19 negative. 


Acute on chronic anemia 


EEG: No seizure activity


ESRD on HD


Hyperglycemia





History of Present Illness


History of Present Illness


Ms Tadeo is a 50yo F w/ PMHx HTN, prediabetes who presented to the emergency 

room with complaints of abdominal pain on 3/16/2020. Found with Lipase 55674, 

, , Bilirubin 1.4.


CT abdomen confirms pancreatic inflammation, peripancreatic fluid and 

inflammatory changes around the pancreas consistent with pancreatitis. 

Cholelithiasis and 1.4cm uterine fibroid as well as possible left salpingitis. 

Admitted for further care


GI, General surgery, ID, Pulm consulted.





3/17: PICC placed per IR. Renal US negative. Started on levophed. Repeat CT 

abdomen w/ necrosis; 3/18: Dialysis catheter per nephrology; 3/19: On BiPAP; 

3/20: BiPAP, dialysis; 3/21: Overnight Tmax 101.7 , still on BiPAP FiO2 40%, 

still on low dose Levophed gtt, TPN initiated. On dialysis


4/6: Tracheostomy; 4/12: S/p tracheostomy on vent spontaneous respirations with 

5 of pressure support 35% FiO2, rectal tube and a [place], off pressors; 

4/14:Still on vent via trach. Removed PICC and CVC LIJ and replaced. CT 

chest/abd/pelvis with bilateral pleural effusion and ascites.


4/15: Renal function stable. Still on vent. More interactive today. Miming wish 

for food. Plan discussed for thoracentesis/paracentesis with daughters today. 

They were under impression patient was doing worse due to a miscommunication 

which has been clarified over the phone. 4.3L removed.


4/17: Febrile overnight 101.8F. More interactive, still on vent. Asking for ice 

by miming; 4/18: Afebrile overnight. TMax last 24 hours 100.6F. Hb 7.1. 

Interactive when awake. 4/22: Transfusion 1u PRBC (6U total since admit)


4/23-4/26: TPN and precedex, vent.


4/27: Tmax 101F overnight. Hb 8.2. HD cath out since 4/24. Alert. On vent SIMV 

35% FiO2. Surgery: ex-lap, no naif or pancreatic necrosectomy 2/2 profound 

inflammation.


4/28: Seen POD #1. Afebrile overnight. BUN 62. CBC WNL today.Remains on vent via

trach, TPN. Able to point today and indicate she wishes her daughters and Rolf 

to be involved in her care.


4/29: Hb 7.4, Na 151. Remains on vent via trach, TPN. off HD for now. Not 

tolerating trach shield well.


4/30: Negative US for UE DVT. More relaxed today. Has some increase in UOP. 

Tolerating vent well. She is requesting to eat and drink via writing. T max 100F

24 hours ago, but 101F at 1200. Right PICC placed. Speaking valve for half the 

day in [facility]


5/1: Na 153 today. Good UOP. Tmax 101F at 1200 on 4/30. She becomes a bit 

anxious and did not sleep much last night, wishes to try to sleep this morning


5/2: Able to speak well with speaking valve today. Na 153, K2.9, Mg 1.8, Cr 1. 

100.4F axillary temp overnight. Asking for food.





Afebrile overnight. ABG with pH 7.27/75/123. Hb 7.1. Na 153. PCA settings 

decreased





Plan:


Cont vent weaning. will need BIPAP


Decrease opioids, may need to d/c PCA. Would favor long active basal fentanyl 

patch 12.5mg and Q3hr prn dilaudid 1mg, will d/w general surgery.


Trach shield and speaking valve during day when awake. Would recommend ABG after

4 hours to r/o CO2 retention, however and still vent overnight


Monitor fever curve, no obvious source of infection, possibly some aspiration ev

ents, atelectasis





Vitals


Vitals





Vital Signs








  Date Time  Temp Pulse Resp B/P (MAP) Pulse Ox O2 Delivery O2 Flow Rate FiO2


 


5/3/20 08:08     95 Ventilator  


 


5/3/20 06:00  103 25 115/65 (82)    


 


5/3/20 04:00 97.7       





 97.7       


 


5/2/20 16:38       10.0 











Physical Exam


Physical Exam


GENERAL: Propped up in bed, sedated weak appearing 


HEENT: Pupils equal, + NGT, oral cavity dry 


NECK:  Trach/vent 


LUNGS: rhonchi 


HEART:  S1, S2, regular 


ABDOMEN: Distended, hypoactive BS, drain placement (4/27 )


: Chino (4/14)


EXTREMITIES: Generalized edema, no cyanosis, SCDs bilaterally 


DERMATOLOGIC:  Warm and dry.  No generalized rash.  


CENTRAL NERVOUS SYSTEM: Extremely weak, nods to few simple questions 


PICC line in place April 30, 2020 clean


HDC has been removed


LIJ removed


General:  Alert, Cooperative, mild distress


Heart:  Regular rate, No murmurs


Lungs:  Crackles


Abdomen:  Normal bowel sounds, Soft, Other (Mildly tender in epigastrium no 

peritoneal signs)


Extremities:  No edema


Skin:  Other (mottling noted to extremities )





Labs


LABS





Laboratory Tests








Test


 5/2/20


13:34 5/2/20


18:06 5/3/20


00:03 5/3/20


05:25


 


Glucose (Fingerstick)


 146 mg/dL


(70-99) 135 mg/dL


(70-99) 122 mg/dL


(70-99) 113 mg/dL


(70-99)


 


Test


 5/3/20


05:30 5/3/20


07:55 


 





 


White Blood Count


 9.7 x10^3/uL


(4.0-11.0) 


 


 





 


Red Blood Count


 2.44 x10^6/uL


(3.50-5.40) 


 


 





 


Hemoglobin


 7.1 g/dL


(12.0-15.5) 


 


 





 


Hematocrit


 22.8 %


(36.0-47.0) 


 


 





 


Mean Corpuscular Volume 93 fL ()    


 


Mean Corpuscular Hemoglobin 29 pg (25-35)    


 


Mean Corpuscular Hemoglobin


Concent 31 g/dL


(31-37) 


 


 





 


Red Cell Distribution Width


 18.1 %


(11.5-14.5) 


 


 





 


Platelet Count


 408 x10^3/uL


(140-400) 


 


 





 


Neutrophils (%) (Auto) 79 % (31-73)    


 


Lymphocytes (%) (Auto) 14 % (24-48)    


 


Monocytes (%) (Auto) 5 % (0-9)    


 


Eosinophils (%) (Auto) 2 % (0-3)    


 


Basophils (%) (Auto) 0 % (0-3)    


 


Neutrophils # (Auto)


 7.7 x10^3/uL


(1.8-7.7) 


 


 





 


Lymphocytes # (Auto)


 1.4 x10^3/uL


(1.0-4.8) 


 


 





 


Monocytes # (Auto)


 0.5 x10^3/uL


(0.0-1.1) 


 


 





 


Eosinophils # (Auto)


 0.2 x10^3/uL


(0.0-0.7) 


 


 





 


Basophils # (Auto)


 0.0 x10^3/uL


(0.0-0.2) 


 


 





 


Sodium Level


 153 mmol/L


(136-145) 


 


 





 


Potassium Level


 4.3 mmol/L


(3.5-5.1) 


 


 





 


Chloride Level


 114 mmol/L


() 


 


 





 


Carbon Dioxide Level


 38 mmol/L


(21-32) 


 


 





 


Anion Gap 1 (6-14)    


 


Blood Urea Nitrogen


 47 mg/dL


(7-20) 


 


 





 


Creatinine


 1.0 mg/dL


(0.6-1.0) 


 


 





 


Estimated GFR


(Cockcroft-Gault) 58.9 


 


 


 





 


Glucose Level


 125 mg/dL


(70-99) 


 


 





 


Calcium Level


 8.7 mg/dL


(8.5-10.1) 


 


 





 


O2 Saturation  97 % (92-99)   


 


Arterial Blood pH


 


 7.27


(7.35-7.45) 


 





 


Arterial Blood pCO2 at


Patient Temp 


 75 mmHg


(35-46) 


 





 


Arterial Blood pO2 at Patient


Temp 


 123 mmHg


() 


 





 


Arterial Blood HCO3


 


 34 mmol/L


(21-28) 


 





 


Arterial Blood Base Excess


 


 6 mmol/L


(-3-3) 


 





 


Oxyhemoglobin  96.8 %   


 


Methemoglobin


 


 0.5 %


(0.0-1.9) 


 





 


Carbon Monoxide, Quantitative


 


 0.1 %


(0.0-1.9) 


 





 


FiO2  40   











Assessment and Plan


Assessmemt and Plan


Problems


Medical Problems:


(1) Acute pancreatitis


Status: Acute  





(2) Cholelithiasis


Status: Acute  











Comment


Review of Relevant


I have reviewed the following items josy (where applicable) has been applied.


Labs





Laboratory Tests








Test


 5/1/20


12:20 5/1/20


17:31 5/1/20


23:34 5/2/20


05:45


 


Glucose (Fingerstick)


 147 mg/dL


(70-99) 140 mg/dL


(70-99) 134 mg/dL


(70-99) 





 


White Blood Count


 


 


 


 9.7 x10^3/uL


(4.0-11.0)


 


Red Blood Count


 


 


 


 2.46 x10^6/uL


(3.50-5.40)


 


Hemoglobin


 


 


 


 7.2 g/dL


(12.0-15.5)


 


Hematocrit


 


 


 


 22.4 %


(36.0-47.0)


 


Mean Corpuscular Volume    91 fL () 


 


Mean Corpuscular Hemoglobin    29 pg (25-35) 


 


Mean Corpuscular Hemoglobin


Concent 


 


 


 32 g/dL


(31-37)


 


Red Cell Distribution Width


 


 


 


 18.1 %


(11.5-14.5)


 


Platelet Count


 


 


 


 419 x10^3/uL


(140-400)


 


Neutrophils (%) (Auto)    77 % (31-73) 


 


Lymphocytes (%) (Auto)    16 % (24-48) 


 


Monocytes (%) (Auto)    5 % (0-9) 


 


Eosinophils (%) (Auto)    2 % (0-3) 


 


Basophils (%) (Auto)    0 % (0-3) 


 


Neutrophils # (Auto)


 


 


 


 7.4 x10^3/uL


(1.8-7.7)


 


Lymphocytes # (Auto)


 


 


 


 1.5 x10^3/uL


(1.0-4.8)


 


Monocytes # (Auto)


 


 


 


 0.4 x10^3/uL


(0.0-1.1)


 


Eosinophils # (Auto)


 


 


 


 0.2 x10^3/uL


(0.0-0.7)


 


Basophils # (Auto)


 


 


 


 0.0 x10^3/uL


(0.0-0.2)


 


Sodium Level


 


 


 


 154 mmol/L


(136-145)


 


Potassium Level


 


 


 


 2.9 mmol/L


(3.5-5.1)


 


Chloride Level


 


 


 


 111 mmol/L


()


 


Carbon Dioxide Level


 


 


 


 34 mmol/L


(21-32)


 


Anion Gap    9 (6-14) 


 


Blood Urea Nitrogen


 


 


 


 45 mg/dL


(7-20)


 


Creatinine


 


 


 


 1.0 mg/dL


(0.6-1.0)


 


Estimated GFR


(Cockcroft-Gault) 


 


 


 58.9 





 


Glucose Level


 


 


 


 153 mg/dL


(70-99)


 


Calcium Level


 


 


 


 8.6 mg/dL


(8.5-10.1)


 


Phosphorus Level


 


 


 


 3.6 mg/dL


(2.6-4.7)


 


Magnesium Level


 


 


 


 1.8 mg/dL


(1.8-2.4)


 


Test


 5/2/20


05:48 5/2/20


13:34 5/2/20


18:06 5/3/20


00:03


 


Glucose (Fingerstick)


 149 mg/dL


(70-99) 146 mg/dL


(70-99) 135 mg/dL


(70-99) 122 mg/dL


(70-99)


 


Test


 5/3/20


05:25 5/3/20


05:30 5/3/20


07:55 





 


Glucose (Fingerstick)


 113 mg/dL


(70-99) 


 


 





 


White Blood Count


 


 9.7 x10^3/uL


(4.0-11.0) 


 





 


Red Blood Count


 


 2.44 x10^6/uL


(3.50-5.40) 


 





 


Hemoglobin


 


 7.1 g/dL


(12.0-15.5) 


 





 


Hematocrit


 


 22.8 %


(36.0-47.0) 


 





 


Mean Corpuscular Volume  93 fL ()   


 


Mean Corpuscular Hemoglobin  29 pg (25-35)   


 


Mean Corpuscular Hemoglobin


Concent 


 31 g/dL


(31-37) 


 





 


Red Cell Distribution Width


 


 18.1 %


(11.5-14.5) 


 





 


Platelet Count


 


 408 x10^3/uL


(140-400) 


 





 


Neutrophils (%) (Auto)  79 % (31-73)   


 


Lymphocytes (%) (Auto)  14 % (24-48)   


 


Monocytes (%) (Auto)  5 % (0-9)   


 


Eosinophils (%) (Auto)  2 % (0-3)   


 


Basophils (%) (Auto)  0 % (0-3)   


 


Neutrophils # (Auto)


 


 7.7 x10^3/uL


(1.8-7.7) 


 





 


Lymphocytes # (Auto)


 


 1.4 x10^3/uL


(1.0-4.8) 


 





 


Monocytes # (Auto)


 


 0.5 x10^3/uL


(0.0-1.1) 


 





 


Eosinophils # (Auto)


 


 0.2 x10^3/uL


(0.0-0.7) 


 





 


Basophils # (Auto)


 


 0.0 x10^3/uL


(0.0-0.2) 


 





 


Sodium Level


 


 153 mmol/L


(136-145) 


 





 


Potassium Level


 


 4.3 mmol/L


(3.5-5.1) 


 





 


Chloride Level


 


 114 mmol/L


() 


 





 


Carbon Dioxide Level


 


 38 mmol/L


(21-32) 


 





 


Anion Gap  1 (6-14)   


 


Blood Urea Nitrogen


 


 47 mg/dL


(7-20) 


 





 


Creatinine


 


 1.0 mg/dL


(0.6-1.0) 


 





 


Estimated GFR


(Cockcroft-Gault) 


 58.9 


 


 





 


Glucose Level


 


 125 mg/dL


(70-99) 


 





 


Calcium Level


 


 8.7 mg/dL


(8.5-10.1) 


 





 


O2 Saturation   97 % (92-99)  


 


Arterial Blood pH


 


 


 7.27


(7.35-7.45) 





 


Arterial Blood pCO2 at


Patient Temp 


 


 75 mmHg


(35-46) 





 


Arterial Blood pO2 at Patient


Temp 


 


 123 mmHg


() 





 


Arterial Blood HCO3


 


 


 34 mmol/L


(21-28) 





 


Arterial Blood Base Excess


 


 


 6 mmol/L


(-3-3) 





 


Oxyhemoglobin   96.8 %  


 


Methemoglobin


 


 


 0.5 %


(0.0-1.9) 





 


Carbon Monoxide, Quantitative


 


 


 0.1 %


(0.0-1.9) 





 


FiO2   40  








Laboratory Tests








Test


 5/2/20


13:34 5/2/20


18:06 5/3/20


00:03 5/3/20


05:25


 


Glucose (Fingerstick)


 146 mg/dL


(70-99) 135 mg/dL


(70-99) 122 mg/dL


(70-99) 113 mg/dL


(70-99)


 


Test


 5/3/20


05:30 5/3/20


07:55 


 





 


White Blood Count


 9.7 x10^3/uL


(4.0-11.0) 


 


 





 


Red Blood Count


 2.44 x10^6/uL


(3.50-5.40) 


 


 





 


Hemoglobin


 7.1 g/dL


(12.0-15.5) 


 


 





 


Hematocrit


 22.8 %


(36.0-47.0) 


 


 





 


Mean Corpuscular Volume 93 fL ()    


 


Mean Corpuscular Hemoglobin 29 pg (25-35)    


 


Mean Corpuscular Hemoglobin


Concent 31 g/dL


(31-37) 


 


 





 


Red Cell Distribution Width


 18.1 %


(11.5-14.5) 


 


 





 


Platelet Count


 408 x10^3/uL


(140-400) 


 


 





 


Neutrophils (%) (Auto) 79 % (31-73)    


 


Lymphocytes (%) (Auto) 14 % (24-48)    


 


Monocytes (%) (Auto) 5 % (0-9)    


 


Eosinophils (%) (Auto) 2 % (0-3)    


 


Basophils (%) (Auto) 0 % (0-3)    


 


Neutrophils # (Auto)


 7.7 x10^3/uL


(1.8-7.7) 


 


 





 


Lymphocytes # (Auto)


 1.4 x10^3/uL


(1.0-4.8) 


 


 





 


Monocytes # (Auto)


 0.5 x10^3/uL


(0.0-1.1) 


 


 





 


Eosinophils # (Auto)


 0.2 x10^3/uL


(0.0-0.7) 


 


 





 


Basophils # (Auto)


 0.0 x10^3/uL


(0.0-0.2) 


 


 





 


Sodium Level


 153 mmol/L


(136-145) 


 


 





 


Potassium Level


 4.3 mmol/L


(3.5-5.1) 


 


 





 


Chloride Level


 114 mmol/L


() 


 


 





 


Carbon Dioxide Level


 38 mmol/L


(21-32) 


 


 





 


Anion Gap 1 (6-14)    


 


Blood Urea Nitrogen


 47 mg/dL


(7-20) 


 


 





 


Creatinine


 1.0 mg/dL


(0.6-1.0) 


 


 





 


Estimated GFR


(Cockcroft-Gault) 58.9 


 


 


 





 


Glucose Level


 125 mg/dL


(70-99) 


 


 





 


Calcium Level


 8.7 mg/dL


(8.5-10.1) 


 


 





 


O2 Saturation  97 % (92-99)   


 


Arterial Blood pH


 


 7.27


(7.35-7.45) 


 





 


Arterial Blood pCO2 at


Patient Temp 


 75 mmHg


(35-46) 


 





 


Arterial Blood pO2 at Patient


Temp 


 123 mmHg


() 


 





 


Arterial Blood HCO3


 


 34 mmol/L


(21-28) 


 





 


Arterial Blood Base Excess


 


 6 mmol/L


(-3-3) 


 





 


Oxyhemoglobin  96.8 %   


 


Methemoglobin


 


 0.5 %


(0.0-1.9) 


 





 


Carbon Monoxide, Quantitative


 


 0.1 %


(0.0-1.9) 


 





 


FiO2  40   








Microbiology


4/30/20 Aerobic Culture, Resulted


          Pending


4/30/20 Aerobic Culture Result 1 (ANSON), Resulted


          Pending


4/30/20 Gram Stain - Final, Resulted


          


4/30/20 Gram Stain Result 1 (ANSON) - Final, Resulted


          


4/30/20 Gram Stain Result 2 (ANSON) - Final, Resulted


          


4/29/20 Blood Culture - Preliminary, Resulted


          NO GROWTH AFTER 3 DAYS


4/27/20 Aerobic and Anaerobic Culture - Preliminary, Resulted


          


4/27/20 Anaerobic Culture Result 1 (ANSON) - Preliminary, Resulted


          


4/27/20 Aerobic Culture - Final, Resulted


          


4/27/20 Aerobic Culture Result 1 (ANSON) - Final, Resulted


          


4/27/20 Gram Stain - Final, Resulted


          


4/27/20 Gram Stain Result 1 (ANSON) - Final, Resulted


          


4/27/20 Gram Stain Result 2 (ANSON) - Final, Resulted


          


4/12/20 Urine Culture - Final, Complete


          


4/12/20 Urine Culture Result 1 (ANSON) - Final, Complete


Medications





Current Medications


Sodium Chloride 1,000 ml @  1,000 mls/hr Q1H IV  Last administered on 3/16/20at 

03:00;  Start 3/16/20 at 03:00;  Stop 3/16/20 at 03:59;  Status DC


Ondansetron HCl (Zofran) 4 mg 1X  ONCE IVP  Last administered on 3/16/20at 

03:27;  Start 3/16/20 at 03:00;  Stop 3/16/20 at 03:01;  Status DC


Morphine Sulfate (Morphine Sulfate) 4 mg 1X  ONCE IV ;  Start 3/16/20 at 03:00; 

Stop 3/16/20 at 03:01;  Status Cancel


Ketorolac Tromethamine (Toradol 30mg Vial) 30 mg 1X  ONCE IV  Last administered 

on 3/16/20at 02:54;  Start 3/16/20 at 03:00;  Stop 3/16/20 at 03:01;  Status DC


Fentanyl Citrate (Fentanyl 2ml Vial) 25 mcg 1X  ONCE IVP  Last administered on 

3/16/20at 03:23;  Start 3/16/20 at 03:30;  Stop 3/16/20 at 03:31;  Status DC


Fentanyl Citrate (Fentanyl 2ml Vial) 100 mcg STK-MED ONCE .ROUTE ;  Start 

3/16/20 at 03:18;  Stop 3/16/20 at 03:18;  Status DC


Iohexol (Omnipaque 350 Mg/ml) 90 ml 1X  ONCE IV  Last administered on 3/16/20at 

03:25;  Start 3/16/20 at 03:30;  Stop 3/16/20 at 03:31;  Status DC


Info (CONTRAST GIVEN -- Rx MONITORING) 1 each PRN DAILY  PRN MC SEE COMMENTS;  

Start 3/16/20 at 03:30;  Stop 3/18/20 at 03:29;  Status DC


Hydromorphone HCl (Dilaudid) 0.5 mg 1X  ONCE IV  Last administered on 3/16/20at 

03:55;  Start 3/16/20 at 04:30;  Stop 3/16/20 at 04:32;  Status DC


Ondansetron HCl (Zofran) 4 mg PRN Q8HRS  PRN IV NAUSEA/VOMITING 1ST CHOICE;  

Start 3/16/20 at 05:00;  Stop 3/16/20 at 09:27;  Status DC


Morphine Sulfate (Morphine Sulfate) 2 mg PRN Q2HR  PRN IV SEVERE PAIN 7-10 Last 

administered on 3/17/20at 12:26;  Start 3/16/20 at 05:00;  Stop 3/17/20 at 

14:15;  Status DC


Sodium Chloride 1,000 ml @  125 mls/hr Q8H IV  Last administered on 3/16/20at 

20:56;  Start 3/16/20 at 05:00;  Stop 3/17/20 at 04:59;  Status DC


Hydromorphone HCl (Dilaudid) 0.5 mg PRN Q3HRS  PRN IV SEVERE PAIN 7-10 Last 

administered on 3/17/20at 10:06;  Start 3/16/20 at 05:00;  Stop 3/17/20 at 12:0

1;  Status DC


Piperacillin Sod/ Tazobactam Sod 4.5 gm/Sodium Chloride 100 ml @  200 mls/hr 1X 

ONCE IV  Last administered on 3/16/20at 05:44;  Start 3/16/20 at 06:00;  Stop 

3/16/20 at 06:29;  Status DC


Ondansetron HCl (Zofran) 4 mg PRN Q4HRS  PRN IV NAUSEA/VOMITING 1ST CHOICE Last 

administered on 5/2/20at 13:05;  Start 3/16/20 at 09:30


Insulin Human Lispro (HumaLOG) 0-9 UNITS Q6HRS SQ  Last administered on 

4/30/20at 17:29;  Start 3/16/20 at 09:30


Dextrose (Dextrose 50%-Water Syringe) 12.5 gm PRN Q15MIN  PRN IV SEE COMMENTS;  

Start 3/16/20 at 09:30


Pantoprazole Sodium (PROTONIX VIAL for IV PUSH) 40 mg DAILYAC IVP  Last 

administered on 5/2/20at 08:20;  Start 3/16/20 at 11:30


Prochlorperazine Edisylate (Compazine) 10 mg PRN Q6HRS  PRN IV NAUSEA/VOMITING, 

2nd CHOICE Last administered on 4/29/20at 14:59;  Start 3/16/20 at 17:45


Atenolol (Tenormin) 100 mg DAILY PO ;  Start 3/17/20 at 09:00;  Stop 3/16/20 at 

20:08;  Status DC


Metoprolol Tartrate (Lopressor Vial) 2.5 mg Q6HRS IVP  Last administered on 

3/17/20at 05:51;  Start 3/16/20 at 20:15;  Stop 3/17/20 at 10:02;  Status DC


Metoprolol Tartrate (Lopressor Vial) 5 mg Q6HRS IVP  Last administered on 

3/26/20at 00:12;  Start 3/17/20 at 10:15;  Stop 3/28/20 at 08:48;  Status DC


Hydromorphone HCl (Dilaudid) 1 mg PRN Q3HRS  PRN IV SEVERE PAIN 7-10 Last 

administered on 3/23/20at 05:13;  Start 3/17/20 at 12:00;  Stop 3/31/20 at 

00:25;  Status DC


Lidocaine HCl (Buffered Lidocaine 1%) 3 ml STK-MED ONCE .ROUTE ;  Start 3/17/20 

at 12:55;  Stop 3/17/20 at 12:56;  Status DC


Albumin Human 500 ml @  125 mls/hr 1X  ONCE IV  Last administered on 3/17/20at 

14:33;  Start 3/17/20 at 14:30;  Stop 3/17/20 at 18:32;  Status DC


Norepinephrine Bitartrate 8 mg/ Dextrose 258 ml @  17.299 mls/ hr CONT  PRN IV 

PER PROTOCOL Last administered on 4/14/20at 12:48;  Start 3/17/20 at 15:30;  

Stop 4/17/20 at 09:19;  Status DC


Sodium Chloride 1,000 ml @  125 mls/hr Q8H IV  Last administered on 3/17/20at 

21:04;  Start 3/17/20 at 16:00;  Stop 3/18/20 at 02:42;  Status DC


Albumin Human 500 ml @  125 mls/hr PRN BID  PRN IV After every 2L NSS & BP < 

90mm Last administered on 4/1/20at 14:21;  Start 3/17/20 at 16:00


Iohexol (Omnipaque 300 Mg/ml) 60 ml 1X  ONCE IV  Last administered on 3/17/20at 

17:20;  Start 3/17/20 at 17:00;  Stop 3/17/20 at 17:01;  Status DC


Info (CONTRAST GIVEN -- Rx MONITORING) 1 each PRN DAILY  PRN MC SEE COMMENTS;  

Start 3/17/20 at 17:00;  Stop 3/19/20 at 16:59;  Status DC


Meropenem 1 gm/ Sodium Chloride 100 ml @  200 mls/hr Q8HRS IV  Last administered

on 3/18/20at 05:45;  Start 3/17/20 at 20:00;  Stop 3/18/20 at 08:48;  Status DC


Furosemide (Lasix) 40 mg 1X  ONCE IVP  Last administered on 3/17/20at 22:12;  

Start 3/17/20 at 22:30;  Stop 3/17/20 at 22:31;  Status DC


Calcium Chloride 1000 mg/Sodium Chloride 110 ml @  220 mls/hr 1X  ONCE IV  Last 

administered on 3/17/20at 22:11;  Start 3/17/20 at 22:30;  Stop 3/17/20 at 

22:59;  Status DC


Albuterol Sulfate (Ventolin Neb Soln) 2.5 mg 1X  ONCE NEB  Last administered on 

3/18/20at 00:56;  Start 3/17/20 at 22:30;  Stop 3/17/20 at 22:31;  Status DC


Insulin Human Regular (HumuLIN R VIAL) 5 unit 1X  ONCE IV  Last administered on 

3/17/20at 22:14;  Start 3/17/20 at 22:30;  Stop 3/17/20 at 22:31;  Status DC


Magnesium Sulfate 50 ml @ 25 mls/hr 1X  ONCE IV  Last administered on 3/18/20at 

02:57;  Start 3/18/20 at 03:00;  Stop 3/18/20 at 04:59;  Status DC


Calcium Gluconate 1000 mg/Sodium Chloride 110 ml @  220 mls/hr 1X  ONCE IV  Last

administered on 3/18/20at 02:46;  Start 3/18/20 at 03:00;  Stop 3/18/20 at 

03:29;  Status DC


Sodium Chloride 1,000 ml @  200 mls/hr Q5H IV  Last administered on 3/18/20at 

02:46;  Start 3/18/20 at 03:00;  Stop 3/18/20 at 10:21;  Status DC


Calcium Gluconate 1000 mg/Sodium Chloride 110 ml @  220 mls/hr 1X  ONCE IV  Last

administered on 3/18/20at 03:21;  Start 3/18/20 at 03:30;  Stop 3/18/20 at 

03:59;  Status DC


Sodium Bicarbonate 50 meq/Sodium Chloride 1,050 ml @  75 mls/hr Q14H IV  Last 

administered on 3/22/20at 21:10;  Start 3/18/20 at 07:30;  Stop 3/23/20 at 

10:28;  Status DC


Calcium Gluconate 2000 mg/Sodium Chloride 120 ml @  220 mls/hr 1X  ONCE IV  Last

administered on 3/18/20at 09:05;  Start 3/18/20 at 07:30;  Stop 3/18/20 at 

08:02;  Status DC


Lidocaine HCl (Xylocaine-Mpf 1% 2ml Vial) 2 ml STK-MED ONCE .ROUTE ;  Start 

3/18/20 at 08:47;  Stop 3/18/20 at 08:47;  Status DC


Meropenem 500 mg/ Sodium Chloride 50 ml @  100 mls/hr Q12HR IV  Last 

administered on 3/23/20at 21:01;  Start 3/18/20 at 18:00;  Stop 3/24/20 at 

07:58;  Status DC


Lidocaine HCl (Buffered Lidocaine 1%) 3 ml STK-MED ONCE .ROUTE ;  Start 3/18/20 

at 09:46;  Stop 3/18/20 at 09:46;  Status DC


Lidocaine HCl (Buffered Lidocaine 1%) 6 ml 1X  ONCE INJ  Last administered on 

3/18/20at 10:26;  Start 3/18/20 at 10:15;  Stop 3/18/20 at 10:16;  Status DC


Info (Tpn Per Pharmacy) 1 each PRN DAILY  PRN MC SEE COMMENTS Last administered 

on 5/2/20at 11:09;  Start 3/18/20 at 12:00


Sodium Chloride 1,000 ml @  1,000 mls/hr Q1H PRN IV hypotension;  Start 3/18/20 

at 12:07;  Stop 3/18/20 at 18:06;  Status DC


Diphenhydramine HCl (Benadryl) 25 mg 1X PRN  PRN IV ITCHING;  Start 3/18/20 at 

12:15;  Stop 3/19/20 at 12:14;  Status DC


Diphenhydramine HCl (Benadryl) 25 mg 1X PRN  PRN IV ITCHING;  Start 3/18/20 at 

12:15;  Stop 3/19/20 at 12:14;  Status DC


Sodium Chloride 1,000 ml @  400 mls/hr Q2H30M PRN IV PATENCY;  Start 3/18/20 at 

12:07;  Stop 3/19/20 at 00:06;  Status DC


Info (PHARMACY MONITORING -- do not chart) 1 each PRN DAILY  PRN MC SEE 

COMMENTS;  Start 3/18/20 at 12:15;  Stop 3/20/20 at 08:13;  Status DC


Sodium Chloride 90 meq/Calcium Gluconate 10 meq/ Multivitamins 10 ml/Chromium/ 

Copper/Manganese/ Seleni/Zn 1 ml/ Total Parenteral Nutrition/Amino 

Acids/Dextrose/ Fat Emulsion Intravenous 55.005 ml  @ 2.292 mls/hr TPN  CONT IV 

;  Start 3/18/20 at 22:00;  Stop 3/18/20 at 12:33;  Status DC


Info (Tpn Per Pharmacy) 1 each PRN DAILY  PRN MC SEE COMMENTS;  Start 3/18/20 at

12:30;  Status UNV


Sodium Chloride 90 meq/Calcium Gluconate 10 meq/ Multivitamins 10 ml/Chromium/ 

Copper/Manganese/ Seleni/Zn 0.5 ml/ Total Parenteral Nutrition/Amino 

Acids/Dextrose/ Fat Emulsion Intravenous 1,512 ml @  63 mls/hr TPN  CONT IV  

Last administered on 3/18/20at 22:06;  Start 3/18/20 at 22:00;  Stop 3/19/20 at 

21:59;  Status DC


Calcium Carbonate/ Glycine (Tums) 500 mg PRN AFTMEALHC  PRN PO INDIGESTION;  

Start 3/18/20 at 17:45


Calcium Gluconate (Calcium Gluconate) 2,000 mg 1X  ONCE IVP  Last administered 

on 3/19/20at 02:19;  Start 3/19/20 at 02:15;  Stop 3/19/20 at 02:16;  Status DC


Calcium Chloride 3000 mg/Sodium Chloride 1,030 ml @  50 mls/hr [id number] IV  Last 

administered on 3/21/20at 02:17;  Start 3/19/20 at 08:00;  Stop 3/21/20 at 

15:23;  Status DC


Lorazepam (Ativan Inj) 1 mg PRN Q4HRS  PRN IVP ANXIETY / AGITATION, 2nd choic 

Last administered on 4/17/20at 03:51;  Start 3/19/20 at 09:00;  Stop 4/17/20 at 

09:19;  Status DC


Sodium Chloride 1,000 ml @  1,000 mls/hr Q1H PRN IV hypotension;  Start 3/19/20 

at 08:56;  Stop 3/19/20 at 14:55;  Status DC


Albumin Human 200 ml @  200 mls/hr 1X PRN  PRN IV Hypotension;  Start 3/19/20 at

09:00;  Stop 3/19/20 at 14:59;  Status DC


Diphenhydramine HCl (Benadryl) 25 mg 1X PRN  PRN IV ITCHING;  Start 3/19/20 at 

09:00;  Stop 3/20/20 at 08:59;  Status DC


Diphenhydramine HCl (Benadryl) 25 mg 1X PRN  PRN IV ITCHING;  Start 3/19/20 at 

09:00;  Stop 3/20/20 at 08:59;  Status DC


Sodium Chloride 1,000 ml @  400 mls/hr Q2H30M PRN IV PATENCY;  Start 3/19/20 at 

08:56;  Stop 3/19/20 at 20:55;  Status DC


Info (PHARMACY MONITORING -- do not chart) 1 each PRN DAILY  PRN MC SEE 

COMMENTS;  Start 3/19/20 at 09:00;  Status UNV


Info (PHARMACY MONITORING -- do not chart) 1 each PRN DAILY  PRN MC SEE 

COMMENTS;  Start 3/19/20 at 09:00;  Stop 3/20/20 at 08:13;  Status DC


Digoxin (Lanoxin) 500 mcg 1X  ONCE IV  Last administered on 3/19/20at 10:04;  

Start 3/19/20 at 10:00;  Stop 3/19/20 at 10:01;  Status DC


Digoxin (Lanoxin) 125 mcg 1X  ONCE IV  Last administered on 3/19/20at 17:10;  

Start 3/19/20 at 18:00;  Stop 3/19/20 at 18:01;  Status DC


Magnesium Sulfate 100 ml @  25 mls/hr 1X  ONCE IV  Last administered on 

3/19/20at 12:48;  Start 3/19/20 at 13:00;  Stop 3/19/20 at 16:59;  Status DC


Sodium Chloride 90 meq/Magnesium Sulfate 10 meq/ Calcium Gluconate 20 meq/ 

Multivitamins 10 ml/Chromium/ Copper/Manganese/ Seleni/Zn 0.5 ml/ Total 

Parenteral Nutrition/Amino Acids/Dextrose/ Fat Emulsion Intravenous 1,512 ml @  

63 mls/hr TPN  CONT IV  Last administered on 3/19/20at 22:25;  Start 3/19/20 at 

22:00;  Stop 3/20/20 at 21:59;  Status DC


Sodium Chloride 1,000 ml @  1,000 mls/hr Q1H PRN IV hypotension;  Start 3/20/20 

at 08:05;  Stop 3/20/20 at 14:04;  Status DC


Albumin Human 200 ml @  200 mls/hr 1X  ONCE IV  Last administered on 3/20/20at 

08:57;  Start 3/20/20 at 08:15;  Stop 3/20/20 at 09:14;  Status DC


Diphenhydramine HCl (Benadryl) 25 mg 1X PRN  PRN IV ITCHING;  Start 3/20/20 at 

08:15;  Stop 3/21/20 at 08:14;  Status DC


Diphenhydramine HCl (Benadryl) 25 mg 1X PRN  PRN IV ITCHING;  Start 3/20/20 at 

08:15;  Stop 3/21/20 at 08:14;  Status DC


Sodium Chloride 1,000 ml @  400 mls/hr Q2H30M PRN IV PATENCY;  Start 3/20/20 at 

08:05;  Stop 3/20/20 at 20:04;  Status DC


Info (PHARMACY MONITORING -- do not chart) 1 each PRN DAILY  PRN MC SEE 

COMMENTS;  Start 3/20/20 at 08:15;  Stop 3/24/20 at 07:57;  Status DC


Sodium Chloride 90 meq/Potassium Chloride 15 meq/ Potassium Phosphate 10 mmol/ 

Magnesium Sulfate 10 meq/Calcium Gluconate 20 meq/ Multivitamins 10 ml/Chromium/

Copper/Manganese/ Seleni/Zn 0.5 ml/ Total Parenteral Nutrition/Amino 

Acids/Dextrose/ Fat Emulsion Intravenous 1,512 ml @  63 mls/hr TPN  CONT IV  

Last administered on 3/20/20at 21:01;  Start 3/20/20 at 22:00;  Stop 3/21/20 at 

21:59;  Status DC


Potassium Chloride/Water 100 ml @  100 mls/hr 1X  ONCE IV  Last administered on 

3/20/20at 14:09;  Start 3/20/20 at 14:00;  Stop 3/20/20 at 14:59;  Status DC


Benzocaine (Hurricaine One) 1 spray 1X  ONCE MM  Last administered on 3/20/20at 

16:38;  Start 3/20/20 at 14:30;  Stop 3/20/20 at 14:31;  Status DC


Lidocaine HCl (Glydo (Lidocaine) Jelly) 1 thomas 1X  ONCE MM  Last administered on 

3/20/20at 16:38;  Start 3/20/20 at 14:30;  Stop 3/20/20 at 14:31;  Status DC


Linezolid/Dextrose 300 ml @  300 mls/hr Q12HR IV  Last administered on 3/26/20at

21:04;  Start 3/20/20 at 20:00;  Stop 3/27/20 at 07:50;  Status DC


Acetaminophen (Tylenol) 650 mg PRN Q6HRS  PRN PO MILD PAIN / TEMP;  Start 

3/21/20 at 03:30;  Stop 3/21/20 at 03:36;  Status DC


Acetaminophen (Tylenol) 650 mg PRN Q6HRS  PRN PEG MILD PAIN / TEMP Last 

administered on 4/16/20at 19:56;  Start 3/21/20 at 03:36


Sodium Chloride 1,000 ml @  1,000 mls/hr Q1H PRN IV hypotension;  Start 3/21/20 

at 07:50;  Stop 3/21/20 at 13:49;  Status DC


Albumin Human 200 ml @  200 mls/hr 1X PRN  PRN IV Hypotension;  Start 3/21/20 at

08:00;  Stop 3/21/20 at 13:59;  Status DC


Sodium Chloride (Normal Saline Flush) 10 ml 1X PRN  PRN IV AP catheter pack;  

Start 3/21/20 at 08:00;  Stop 3/22/20 at 07:59;  Status DC


Sodium Chloride (Normal Saline Flush) 10 ml 1X PRN  PRN IV  catheter pack;  St

art 3/21/20 at 08:00;  Stop 3/22/20 at 07:59;  Status DC


Sodium Chloride 1,000 ml @  400 mls/hr Q2H30M PRN IV PATENCY;  Start 3/21/20 at 

07:50;  Stop 3/21/20 at 19:49;  Status DC


Info (PHARMACY MONITORING -- do not chart) 1 each PRN DAILY  PRN MC SEE COMME

NTS;  Start 3/21/20 at 08:00;  Status UNV


Info (PHARMACY MONITORING -- do not chart) 1 each PRN DAILY  PRN MC SEE 

COMMENTS;  Start 3/21/20 at 08:00;  Stop 3/23/20 at 08:25;  Status DC


Sodium Chloride 90 meq/Potassium Chloride 15 meq/ Potassium Phosphate 10 mmol/ 

Magnesium Sulfate 10 meq/Calcium Gluconate 20 meq/ Multivitamins 10 ml/Chromium/

Copper/Manganese/ Seleni/Zn 0.5 ml/ Total Parenteral Nutrition/Amino 

Acids/Dextrose/ Fat Emulsion Intravenous 1,512 ml @  63 mls/hr TPN  CONT IV  

Last administered on 3/21/20at 20:57;  Start 3/21/20 at 22:00;  Stop 3/22/20 at 

21:59;  Status DC


Sodium Chloride 90 meq/Potassium Chloride 15 meq/ Potassium Phosphate 15 mmol/ 

Magnesium Sulfate 10 meq/Calcium Gluconate 20 meq/ Multivitamins 10 ml/Chromium/

Copper/Manganese/ Seleni/Zn 0.5 ml/ Total Parenteral Nutrition/Amino 

Acids/Dextrose/ Fat Emulsion Intravenous 1,512 ml @  63 mls/hr TPN  CONT IV ;  

Start 3/22/20 at 22:00;  Stop 3/22/20 at 14:16;  Status DC


Sodium Chloride 90 meq/Potassium Chloride 15 meq/ Potassium Phosphate 15 mmol/ 

Magnesium Sulfate 10 meq/Calcium Gluconate 20 meq/ Multivitamins 10 ml/Chromium/

Copper/Manganese/ Seleni/Zn 0.5 ml/ Total Parenteral Nutrition/Amino 

Acids/Dextrose/ Fat Emulsion Intravenous 1,200 ml @  50 mls/hr TPN  CONT IV ;  

Start 3/22/20 at 22:00;  Stop 3/22/20 at 14:17;  Status DC


Sodium Chloride 90 meq/Potassium Chloride 15 meq/ Potassium Phosphate 10 mmol/ 

Magnesium Sulfate 10 meq/Calcium Gluconate 20 meq/ Multivitamins 10 ml/Chromium/

Copper/Manganese/ Seleni/Zn 0.5 ml/ Total Parenteral Nutrition/Amino 

Acids/Dextrose/ Fat Emulsion Intravenous 1,200 ml @  50 mls/hr TPN  CONT IV  

Last administered on 3/22/20at 23:29;  Start 3/22/20 at 22:00;  Stop 3/23/20 at 

21:59;  Status DC


Sodium Chloride 1,000 ml @  1,000 mls/hr Q1H PRN IV hypotension;  Start 3/23/20 

at 07:28;  Stop 3/23/20 at 13:27;  Status DC


Albumin Human 200 ml @  200 mls/hr 1X  ONCE IV  Last administered on 3/23/20at 

08:51;  Start 3/23/20 at 07:30;  Stop 3/23/20 at 08:29;  Status DC


Diphenhydramine HCl (Benadryl) 25 mg 1X PRN  PRN IV ITCHING;  Start 3/23/20 at 

07:30;  Stop 3/24/20 at 07:29;  Status DC


Diphenhydramine HCl (Benadryl) 25 mg 1X PRN  PRN IV ITCHING;  Start 3/23/20 at 

07:30;  Stop 3/24/20 at 07:29;  Status DC


Sodium Chloride 1,000 ml @  400 mls/hr Q2H30M PRN IV PATENCY;  Start 3/23/20 at 

07:28;  Stop 3/23/20 at 19:27;  Status DC


Info (PHARMACY MONITORING -- do not chart) 1 each PRN DAILY  PRN MC SEE 

COMMENTS;  Start 3/23/20 at 07:30;  Stop 4/3/20 at 13:01;  Status DC


Metronidazole 100 ml @  100 mls/hr Q6HRS IV  Last administered on 4/8/20at 

06:26;  Start 3/23/20 at 08:30;  Stop 4/8/20 at 09:58;  Status DC


Micafungin Sodium 100 mg/Dextrose 100 ml @  100 mls/hr Q24H IV  Last 

administered on 4/30/20at 08:18;  Start 3/23/20 at 09:00;  Stop 4/30/20 at 

20:58;  Status DC


Propofol 0 ml @ As Directed STK-MED ONCE IV ;  Start 3/23/20 at 07:53;  Stop 

3/23/20 at 07:53;  Status DC


Etomidate (Amidate) 20 mg STK-MED ONCE IV ;  Start 3/23/20 at 07:53;  Stop 

3/23/20 at 07:54;  Status DC


Midazolam HCl (Versed) 5 mg STK-MED ONCE .ROUTE ;  Start 3/23/20 at 07:57;  Stop

3/23/20 at 07:57;  Status DC


Fentanyl Citrate 30 ml @ 0 mls/hr CONT  PRN IV SEE PROTOCOL Last administered on

4/17/20at 06:12;  Start 3/23/20 at 08:15;  Stop 4/17/20 at 09:19;  Status DC


Artificial Tears (Artificial Tears) 1 drop PRN Q1HR  PRN OU DRY EYE, 1st choice;

 Start 3/23/20 at 08:15;  Stop 4/29/20 at 05:31;  Status DC


Midazolam HCl 50 mg/Sodium Chloride 50 ml @ 0 mls/hr CONT  PRN IV SEE PROTOCOL 

Last administered on 3/26/20at 22:39;  Start 3/23/20 at 08:15;  Stop 3/28/20 at 

15:59;  Status DC


Etomidate (Amidate) 8 mg 1X  ONCE IV  Last administered on 3/23/20at 08:33;  

Start 3/23/20 at 08:30;  Stop 3/23/20 at 08:31;  Status DC


Succinylcholine Chloride (Anectine) 120 mg 1X  ONCE IV  Last administered on 

3/23/20at 08:34;  Start 3/23/20 at 08:30;  Stop 3/23/20 at 08:31;  Status DC


Midazolam HCl (Versed) 5 mg 1X  ONCE IV ;  Start 3/23/20 at 08:30;  Stop 3/23/20

at 08:31;  Status DC


Potassium Chloride 15 meq/ Bicarbonate Dialysis Soln w/ out KCl 5,007.5 ml  @ 

1,000 mls/ hr Q5H1M IV  Last administered on 3/24/20at 11:11;  Start 3/23/20 at 

12:00;  Stop 3/24/20 at 11:15;  Status DC


Potassium Chloride 15 meq/ Bicarbonate Dialysis Soln w/ out KCl 5,007.5 ml  @ 

1,000 mls/ hr Q5H1M IV  Last administered on 3/24/20at 11:12;  Start 3/23/20 at 

12:00;  Stop 3/24/20 at 11:17;  Status DC


Potassium Chloride 15 meq/ Bicarbonate Dialysis Soln w/ out KCl 5,007.5 ml  @ 

1,000 mls/ hr Q5H1M IV  Last administered on 3/24/20at 11:11;  Start 3/23/20 at 

12:00;  Stop 3/24/20 at 11:19;  Status DC


Sodium Chloride 90 meq/Potassium Chloride 15 meq/ Potassium Phosphate 10 mmol/ 

Magnesium Sulfate 10 meq/Calcium Gluconate 20 meq/ Multivitamins 10 ml/Chromium/

Copper/Manganese/ Seleni/Zn 0.5 ml/ Total Parenteral Nutrition/Amino 

Acids/Dextrose/ Fat Emulsion Intravenous 1,400 ml @  58.333 mls/ hr TPN  CONT IV

 Last administered on 3/23/20at 21:42;  Start 3/23/20 at 22:00;  Stop 3/24/20 at

21:59;  Status DC


Heparin Sodium (Porcine) (Heparin Sodium) 5,000 unit Q8HRS SQ  Last administered

on 3/28/20at 05:55;  Start 3/23/20 at 15:00;  Stop 3/28/20 at 13:28;  Status DC


Meropenem 500 mg/ Sodium Chloride 50 ml @  100 mls/hr Q6HRS IV  Last 

administered on 3/25/20at 06:00;  Start 3/24/20 at 09:00;  Stop 3/25/20 at 

07:29;  Status DC


Potassium Phosphate 20 mmol/ Sodium Chloride 106.6667 ml @  51.667 m... 1X  ONCE

IV  Last administered on 3/24/20at 11:22;  Start 3/24/20 at 10:15;  Stop 3/24/20

at 12:18;  Status DC


Acetaminophen (Tylenol Supp) 650 mg PRN Q6HRS  PRN MI MILD PAIN / TEMP > 100.3'F

Last administered on 4/27/20at 00:32;  Start 3/24/20 at 10:30


Potassium Chloride/Water 100 ml @  100 mls/hr Q1H IV  Last administered on 

3/24/20at 12:12;  Start 3/24/20 at 11:00;  Stop 3/24/20 at 12:59;  Status DC


Potassium Chloride 20 meq/ Bicarbonate Dialysis Soln w/ out KCl 5,010 ml @  

1,000 mls/hr Q5H1M IV  Last administered on 3/25/20at 08:48;  Start 3/24/20 at 

12:00;  Stop 3/25/20 at 13:03;  Status DC


Potassium Chloride 20 meq/ Bicarbonate Dialysis Soln w/ out KCl 5,010 ml @  

1,000 mls/hr Q5H1M IV  Last administered on 3/29/20at 14:52;  Start 3/24/20 at 

11:30;  Stop 3/29/20 at 19:59;  Status DC


Potassium Chloride 20 meq/ Bicarbonate Dialysis Soln w/ out KCl 5,010 ml @  

1,000 mls/hr Q5H1M IV  Last administered on 3/29/20at 14:53;  Start 3/24/20 at 

11:30;  Stop 3/29/20 at 19:59;  Status DC


Sodium Chloride 90 meq/Potassium Chloride 15 meq/ Potassium Phosphate 15 mmol/ 

Magnesium Sulfate 10 meq/Calcium Gluconate 15 meq/ Multivitamins 10 ml/Chromium/

Copper/Manganese/ Seleni/Zn 0.5 ml/ Total Parenteral Nutrition/Amino 

Acids/Dextrose/ Fat Emulsion Intravenous 1,400 ml @  58.333 mls/ hr TPN  CONT IV

 Last administered on 3/24/20at 22:17;  Start 3/24/20 at 22:00;  Stop 3/25/20 at

21:59;  Status DC


Cefepime HCl (Maxipime) 2 gm Q12HR IVP  Last administered on 4/7/20at 20:56;  

Start 3/25/20 at 09:00;  Stop 4/8/20 at 09:58;  Status DC


Daptomycin 500 mg/ Sodium Chloride 50 ml @  100 mls/hr Q48H IV  Last 

administered on 4/10/20at 09:57;  Start 3/25/20 at 08:30;  Stop 4/10/20 at 

10:07;  Status DC


Lidocaine HCl (Buffered Lidocaine 1%) 3 ml 1X  ONCE INJ  Last administered on 

3/25/20at 10:27;  Start 3/25/20 at 10:30;  Stop 3/25/20 at 10:31;  Status DC


Potassium Phosphate 20 mmol/ Sodium Chloride 106.6667 ml @  51.667 m... 1X  ONCE

IV  Last administered on 3/25/20at 12:51;  Start 3/25/20 at 13:00;  Stop 3/25/20

at 15:03;  Status DC


Sodium Chloride 90 meq/Potassium Chloride 15 meq/ Potassium Phosphate 18 mmol/ 

Magnesium Sulfate 8 meq/Calcium Gluconate 15 meq/ Multivitamins 10 ml/Chromium/ 

Copper/Manganese/ Seleni/Zn 0.5 ml/ Total Parenteral Nutrition/Amino 

Acids/Dextrose/ Fat Emulsion Intravenous 1,400 ml @  58.333 mls/ hr TPN  CONT IV

 Last administered on 3/25/20at 22:16;  Start 3/25/20 at 22:00;  Stop 3/26/20 at

21:59;  Status DC


Potassium Chloride 20 meq/ Bicarbonate Dialysis Soln w/ out KCl 5,010 ml @  

1,000 mls/hr Q5H1M IV  Last administered on 3/29/20at 14:54;  Start 3/25/20 at 

16:00;  Stop 3/29/20 at 19:59;  Status DC


Multi-Ingred Cream/Lotion/Oil/ Oint (Artificial Tears Eye Ointment) 1 thomas PRN 

Q1HR  PRN OU DRY EYE, 2nd choice Last administered on 4/13/20at 08:19;  Start 

3/25/20 at 17:30


Sodium Chloride 90 meq/Potassium Chloride 15 meq/ Potassium Phosphate 18 mmol/ 

Magnesium Sulfate 8 meq/Calcium Gluconate 15 meq/ Multivitamins 10 ml/Chromium/ 

Copper/Manganese/ Seleni/Zn 0.5 ml/ Total Parenteral Nutrition/Amino Aci

ds/Dextrose/ Fat Emulsion Intravenous 1,400 ml @  58.333 mls/ hr TPN  CONT IV  

Last administered on 3/26/20at 22:00;  Start 3/26/20 at 22:00;  Stop 3/27/20 at 

21:59;  Status DC


Albumin Human 500 ml @  125 mls/hr 1X  ONCE IV ;  Start 3/26/20 at 14:15;  Stop 

3/26/20 at 18:14;  Status DC


Sodium Chloride 90 meq/Potassium Chloride 15 meq/ Potassium Phosphate 18 mmol/ 

Magnesium Sulfate 8 meq/Calcium Gluconate 15 meq/ Multivitamins 10 ml/Chromium/ 

Copper/Manganese/ Seleni/Zn 0.5 ml/ Insulin Human Regular 10 unit/ Total 

Parenteral Nutrition/Amino Acids/Dextrose/ Fat Emulsion Intravenous 1,400 ml @  

58.333 mls/ hr TPN  CONT IV  Last administered on 3/27/20at 21:43;  Start 

3/27/20 at 22:00;  Stop 3/28/20 at 21:59;  Status DC


Lidocaine HCl (Buffered Lidocaine 1%) 3 ml STK-MED ONCE .ROUTE ;  Start 3/25/20 

at 10:00;  Stop 3/27/20 at 13:57;  Status DC


Midazolam HCl 100 mg/Sodium Chloride 100 ml @ 7 mls/hr CONT  PRN IV SEE PROTOCOL

Last administered on 4/8/20at 15:35;  Start 3/28/20 at 16:00


Sodium Chloride 90 meq/Potassium Chloride 15 meq/ Potassium Phosphate 18 mmol/ 

Magnesium Sulfate 8 meq/Calcium Gluconate 15 meq/ Multivitamins 10 ml/Chromium/ 

Copper/Manganese/ Seleni/Zn 0.5 ml/ Insulin Human Regular 15 unit/ Total 

Parenteral Nutrition/Amino Acids/Dextrose/ Fat Emulsion Intravenous 1,400 ml @  

58.333 mls/ hr TPN  CONT IV  Last administered on 3/28/20at 20:34;  Start 

3/28/20 at 22:00;  Stop 3/29/20 at 21:59;  Status DC


Info ([facility] Electrolyte Protocol) 1 ea CONT PRN  PRN MC PER PROTOCOL;  Start 

3/29/20 at 13:15


Sodium Chloride 90 meq/Potassium Chloride 15 meq/ Potassium Phosphate 18 mmol/ 

Magnesium Sulfate 8 meq/Calcium Gluconate 15 meq/ Multivitamins 10 ml/Chromium/ 

Copper/Manganese/ Seleni/Zn 0.5 ml/ Insulin Human Regular 15 unit/ Total 

Parenteral Nutrition/Amino Acids/Dextrose/ Fat Emulsion Intravenous 1,400 ml @  

58.333 mls/ hr TPN  CONT IV  Last administered on 3/29/20at 22:05;  Start 

3/29/20 at 22:00;  Stop 3/30/20 at 21:59;  Status DC


Potassium Chloride 15 meq/ Bicarbonate Dialysis Soln w/ out KCl 5,007.5 ml  @ 

1,000 mls/ hr Q5H1M IV  Last administered on 4/1/20at 18:14;  Start 3/29/20 at 

20:00;  Stop 4/2/20 at 13:08;  Status DC


Potassium Chloride 15 meq/ Bicarbonate Dialysis Soln w/ out KCl 5,007.5 ml  @ 

1,000 mls/ hr Q5H1M IV  Last administered on 4/1/20at 18:14;  Start 3/29/20 at 

20:00;  Stop 4/2/20 at 13:08;  Status DC


Potassium Chloride 15 meq/ Bicarbonate Dialysis Soln w/ out KCl 5,007.5 ml  @ 

1,000 mls/ hr Q5H1M IV  Last administered on 4/1/20at 18:14;  Start 3/29/20 at 

20:00;  Stop 4/2/20 at 13:08;  Status DC


Iohexol (Omnipaque 240 Mg/ml) 30 ml 1X  ONCE PO  Last administered on 3/30/20at 

11:30;  Start 3/30/20 at 11:30;  Stop 3/30/20 at 11:33;  Status DC


Info (CONTRAST GIVEN -- Rx MONITORING) 1 each PRN DAILY  PRN MC SEE COMMENTS;  

Start 3/30/20 at 11:45;  Stop 4/1/20 at 11:44;  Status DC


Sodium Chloride 90 meq/Potassium Chloride 15 meq/ Potassium Phosphate 18 mmol/ 

Magnesium Sulfate 8 meq/Calcium Gluconate 15 meq/ Multivitamins 10 ml/Chromium/ 

Copper/Manganese/ Seleni/Zn 0.5 ml/ Insulin Human Regular 15 unit/ Total 

Parenteral Nutrition/Amino Acids/Dextrose/ Fat Emulsion Intravenous 1,400 ml @  

58.333 mls/ hr TPN  CONT IV  Last administered on 3/30/20at 21:47;  Start 

3/30/20 at 22:00;  Stop 3/31/20 at 21:59;  Status DC


Sodium Chloride 90 meq/Potassium Chloride 15 meq/ Potassium Phosphate 18 mmol/ 

Magnesium Sulfate 8 meq/Calcium Gluconate 15 meq/ Multivitamins 10 ml/Chromium/ 

Copper/Manganese/ Seleni/Zn 0.5 ml/ Insulin Human Regular 20 unit/ Total 

Parenteral Nutrition/Amino Acids/Dextrose/ Fat Emulsion Intravenous 1,400 ml @  

58.333 mls/ hr TPN  CONT IV  Last administered on 3/31/20at 21:36;  Start 

3/31/20 at 22:00;  Stop 4/1/20 at 21:59;  Status DC


Alteplase, Recombinant (Cathflo For Central Catheter Clearance) 1 mg 1X  ONCE 

INT CAT  Last administered on 3/31/20at 20:03;  Start 3/31/20 at 19:30;  Stop 

3/31/20 at 19:46;  Status DC


Alteplase, Recombinant (Cathflo For Central Catheter Clearance) 1 mg 1X  ONCE 

INT CAT  Last administered on 3/31/20at 22:05;  Start 3/31/20 at 22:00;  Stop 

3/31/20 at 22:01;  Status DC


Sodium Chloride 90 meq/Potassium Chloride 15 meq/ Potassium Phosphate 18 mmol/ 

Magnesium Sulfate 8 meq/Calcium Gluconate 15 meq/ Multivitamins 10 ml/Chromium/ 

Copper/Manganese/ Seleni/Zn 0.5 ml/ Insulin Human Regular 20 unit/ Total 

Parenteral Nutrition/Amino Acids/Dextrose/ Fat Emulsion Intravenous 1,400 ml @  

58.333 mls/ hr TPN  CONT IV  Last administered on 4/1/20at 21:30;  Start 4/1/20 

at 22:00;  Stop 4/2/20 at 21:59;  Status DC


Dexmedetomidine HCl 400 mcg/ Sodium Chloride 100 ml @ 0 mls/hr CONT  PRN IV 

ANXIETY / AGITATION Last administered on 5/3/20at 05:29;  Start 4/2/20 at 08:15


Sodium Chloride 500 ml @  500 mls/hr 1X PRN  PRN IV ELEVATED BP, SEE COMMENTS;  

Start 4/2/20 at 08:15


Atropine Sulfate (ATROPINE 0.5mg SYRINGE) 0.5 mg PRN Q5MIN  PRN IV SEE COMMENTS;

 Start 4/2/20 at 08:15


Furosemide (Lasix) 20 mg 1X  ONCE IVP  Last administered on 4/2/20at 08:19;  

Start 4/2/20 at 08:15;  Stop 4/2/20 at 08:16;  Status DC


Lidocaine HCl (Buffered Lidocaine 1%) 3 ml STK-MED ONCE .ROUTE ;  Start 4/2/20 

at 08:39;  Stop 4/2/20 at 08:39;  Status DC


Lidocaine HCl (Buffered Lidocaine 1%) 6 ml 1X  ONCE INJ  Last administered on 

4/2/20at 09:05;  Start 4/2/20 at 09:00;  Stop 4/2/20 at 09:06;  Status DC


Sodium Chloride 90 meq/Potassium Chloride 15 meq/ Potassium Phosphate 18 mmol/ 

Magnesium Sulfate 8 meq/Calcium Gluconate 15 meq/ Multivitamins 10 ml/Chromium/ 

Copper/Manganese/ Seleni/Zn 0.5 ml/ Insulin Human Regular 20 unit/ Total 

Parenteral Nutrition/Amino Acids/Dextrose/ Fat Emulsion Intravenous 1,400 ml @  

58.333 mls/ hr TPN  CONT IV  Last administered on 4/2/20at 22:45;  Start 4/2/20 

at 22:00;  Stop 4/3/20 at 21:59;  Status DC


Sodium Chloride 1,000 ml @  1,000 mls/hr Q1H PRN IV hypotension;  Start 4/3/20 

at 07:30;  Stop 4/3/20 at 13:29;  Status DC


Albumin Human 200 ml @  200 mls/hr 1X PRN  PRN IV Hypotension Last administered 

on 4/3/20at 09:36;  Start 4/3/20 at 07:30;  Stop 4/3/20 at 13:29;  Status DC


Sodium Chloride (Normal Saline Flush) 10 ml 1X PRN  PRN IV AP catheter pack;  

Start 4/3/20 at 07:30;  Stop 4/3/20 at 21:29;  Status DC


Sodium Chloride (Normal Saline Flush) 10 ml 1X PRN  PRN IV  catheter pack;  

Start 4/3/20 at 07:30;  Stop 4/4/20 at 07:29;  Status DC


Sodium Chloride 1,000 ml @  400 mls/hr Q2H30M PRN IV PATENCY;  Start 4/3/20 at 

07:30;  Stop 4/3/20 at 19:29;  Status DC


Info (PHARMACY MONITORING -- do not chart) 1 each PRN DAILY  PRN MC SEE 

COMMENTS;  Start 4/3/20 at 07:30;  Stop 4/3/20 at 13:02;  Status DC


Info (PHARMACY MONITORING -- do not chart) 1 each PRN DAILY  PRN MC SEE 

COMMENTS;  Start 4/3/20 at 07:30;  Stop 4/5/20 at 12:45;  Status DC


Sodium Chloride 90 meq/Potassium Chloride 15 meq/ Potassium Phosphate 10 mmol/ 

Magnesium Sulfate 8 meq/Calcium Gluconate 15 meq/ Multivitamins 10 ml/Chromium/ 

Copper/Manganese/ Seleni/Zn 0.5 ml/ Insulin Human Regular 25 unit/ Total 

Parenteral Nutrition/Amino Acids/Dextrose/ Fat Emulsion Intravenous 1,400 ml @  

58.333 mls/ hr TPN  CONT IV  Last administered on 4/3/20at 22:19;  Start 4/3/20 

at 22:00;  Stop 4/4/20 at 21:59;  Status DC


Heparin Sodium (Porcine) (Heparin Sodium) 5,000 unit Q12HR SQ  Last administered

on 4/26/20at 08:59;  Start 4/3/20 at 21:00;  Stop 4/26/20 at 10:05;  Status DC


Ondansetron HCl (Zofran) 4 mg PRN Q6HRS  PRN IV NAUSEA/VOMITING;  Start 4/6/20 

at 07:00;  Stop 4/7/20 at 06:59;  Status DC


Fentanyl Citrate (Fentanyl 2ml Vial) 25 mcg PRN Q5MIN  PRN IV MILD PAIN 1-3;  

Start 4/6/20 at 07:00;  Stop 4/7/20 at 06:59;  Status DC


Fentanyl Citrate (Fentanyl 2ml Vial) 50 mcg PRN Q5MIN  PRN IV MODERATE TO SEVERE

PAIN;  Start 4/6/20 at 07:00;  Stop 4/7/20 at 06:59;  Status DC


Ringer's Solution 1,000 ml @  30 mls/hr Q24H IV ;  Start 4/6/20 at 07:00;  Stop 

4/6/20 at 18:59;  Status DC


Lidocaine HCl (Xylocaine-Mpf 1% 2ml Vial) 2 ml PRN 1X  PRN ID PRIOR TO IV START;

 Start 4/6/20 at 07:00;  Stop 4/7/20 at 06:59;  Status DC


Prochlorperazine Edisylate (Compazine) 5 mg PACU PRN  PRN IV NAUSEA, MRX1;  

Start 4/6/20 at 07:00;  Stop 4/7/20 at 06:59;  Status DC


Sodium Chloride 1,000 ml @  1,000 mls/hr Q1H PRN IV hypotension;  Start 4/4/20 

at 09:10;  Stop 4/4/20 at 15:09;  Status DC


Albumin Human 200 ml @  200 mls/hr 1X PRN  PRN IV Hypotension Last administered 

on 4/4/20at 10:10;  Start 4/4/20 at 09:15;  Stop 4/4/20 at 15:14;  Status DC


Sodium Chloride 1,000 ml @  400 mls/hr Q2H30M PRN IV PATENCY;  Start 4/4/20 at 

09:10;  Stop 4/4/20 at 21:09;  Status DC


Info (PHARMACY MONITORING -- do not chart) 1 each PRN DAILY  PRN MC SEE 

COMMENTS;  Start 4/4/20 at 09:15;  Stop 4/5/20 at 12:45;  Status DC


Info (PHARMACY MONITORING -- do not chart) 1 each PRN DAILY  PRN MC SEE 

COMMENTS;  Start 4/4/20 at 09:15;  Stop 4/5/20 at 12:45;  Status DC


Sodium Chloride 90 meq/Potassium Chloride 15 meq/ Potassium Phosphate 10 mmol/ 

Magnesium Sulfate 8 meq/Calcium Gluconate 15 meq/ Multivitamins 10 ml/Chromium/ 

Copper/Manganese/ Seleni/Zn 0.5 ml/ Insulin Human Regular 25 unit/ Total 

Parenteral Nutrition/Amino Acids/Dextrose/ Fat Emulsion Intravenous 1,400 ml @  

58.333 mls/ hr TPN  CONT IV  Last administered on 4/4/20at 22:10;  Start 4/4/20 

at 22:00;  Stop 4/5/20 at 21:59;  Status DC


Magnesium Sulfate 50 ml @ 25 mls/hr PRN DAILY  PRN IV for Mag < 1.7 on am labs 

Last administered on 4/20/20at 17:27;  Start 4/5/20 at 09:15


Sodium Chloride 90 meq/Potassium Chloride 15 meq/ Potassium Phosphate 10 mmol/ 

Magnesium Sulfate 8 meq/Calcium Gluconate 15 meq/ Multivitamins 10 ml/Chromium/ 

Copper/Manganese/ Seleni/Zn 0.5 ml/ Insulin Human Regular 25 unit/ Total 

Parenteral Nutrition/Amino Acids/Dextrose/ Fat Emulsion Intravenous 1,400 ml @  

58.333 mls/ hr TPN  CONT IV  Last administered on 4/5/20at 21:20;  Start 4/5/20 

at 22:00;  Stop 4/6/20 at 21:59;  Status DC


Sodium Chloride 1,000 ml @  1,000 mls/hr Q1H PRN IV hypotension;  Start 4/5/20 

at 12:23;  Stop 4/5/20 at 18:22;  Status DC


Albumin Human 200 ml @  200 mls/hr 1X  ONCE IV  Last administered on 4/5/20at 

13:34;  Start 4/5/20 at 12:30;  Stop 4/5/20 at 13:29;  Status DC


Diphenhydramine HCl (Benadryl) 25 mg 1X PRN  PRN IV ITCHING;  Start 4/5/20 at 

12:30;  Stop 4/6/20 at 12:29;  Status DC


Diphenhydramine HCl (Benadryl) 25 mg 1X PRN  PRN IV ITCHING;  Start 4/5/20 at 

12:30;  Stop 4/6/20 at 12:29;  Status DC


Info (PHARMACY MONITORING -- do not chart) 1 each PRN DAILY  PRN MC SEE 

COMMENTS;  Start 4/5/20 at 12:30;  Status Cancel


Bupivacaine HCl/ Epinephrine Bitart (Sensorcain-Epi 0.5%-1:[id number] Mpf) 30 ml 

STK-MED ONCE .ROUTE  Last administered on 4/6/20at 11:44;  Start 4/6/20 at 

11:00;  Stop 4/6/20 at 11:01;  Status DC


Cellulose (Surgicel Fibrillar 1x2) 1 each STK-MED ONCE .ROUTE ;  Start 4/6/20 at

11:00;  Stop 4/6/20 at 11:01;  Status DC


Sodium Chloride 90 meq/Potassium Chloride 15 meq/ Potassium Phosphate 10 mmol/ 

Magnesium Sulfate 12 meq/Calcium Gluconate 15 meq/ Multivitamins 10 ml/Chromium/

Copper/Manganese/ Seleni/Zn 0.5 ml/ Insulin Human Regular 25 unit/ Total 

Parenteral Nutrition/Amino Acids/Dextrose/ Fat Emulsion Intravenous 1,400 ml @  

58.333 mls/ hr TPN  CONT IV  Last administered on 4/6/20at 22:24;  Start 4/6/20 

at 22:00;  Stop 4/7/20 at 21:59;  Status DC


Propofol 20 ml @ As Directed STK-MED ONCE IV ;  Start 4/6/20 at 11:07;  Stop 

4/6/20 at 11:07;  Status DC


Cellulose (Surgicel Hemostat 4x8) 1 each STK-MED ONCE .ROUTE  Last administered 

on 4/6/20at 11:44;  Start 4/6/20 at 11:55;  Stop 4/6/20 at 11:56;  Status DC


Sevoflurane (Ultane) 60 ml STK-MED ONCE IH ;  Start 4/6/20 at 12:46;  Stop 

4/6/20 at 12:46;  Status DC


Sodium Chloride 1,000 ml @  1,000 mls/hr Q1H PRN IV hypotension;  Start 4/6/20 

at 13:51;  Stop 4/6/20 at 19:50;  Status DC


Albumin Human 200 ml @  200 mls/hr 1X PRN  PRN IV Hypotension Last administered 

on 4/6/20at 14:51;  Start 4/6/20 at 14:00;  Stop 4/6/20 at 19:59;  Status DC


Diphenhydramine HCl (Benadryl) 25 mg 1X PRN  PRN IV ITCHING;  Start 4/6/20 at 

14:00;  Stop 4/7/20 at 13:59;  Status DC


Diphenhydramine HCl (Benadryl) 25 mg 1X PRN  PRN IV ITCHING;  Start 4/6/20 at 

14:00;  Stop 4/7/20 at 13:59;  Status DC


Sodium Chloride 1,000 ml @  400 mls/hr Q2H30M PRN IV PATENCY;  Start 4/6/20 at 

13:51;  Stop 4/7/20 at 01:50;  Status DC


Info (PHARMACY MONITORING -- do not chart) 1 each PRN DAILY  PRN MC SEE 

COMMENTS;  Start 4/6/20 at 14:00;  Stop 4/9/20 at 08:16;  Status DC


Heparin Sodium (Porcine) (Hep Lock Adult) 500 unit STK-MED ONCE IVP ;  Start 

4/7/20 at 09:29;  Stop 4/7/20 at 09:30;  Status DC


Sodium Chloride 1,000 ml @  1,000 mls/hr Q1H PRN IV hypotension;  Start 4/7/20 

at 10:43;  Stop 4/7/20 at 16:42;  Status DC


Sodium Chloride 1,000 ml @  400 mls/hr Q2H30M PRN IV PATENCY;  Start 4/7/20 at 

10:43;  Stop 4/7/20 at 22:42;  Status DC


Info (PHARMACY MONITORING -- do not chart) 1 each PRN DAILY  PRN MC SEE 

COMMENTS;  Start 4/7/20 at 10:45;  Status UNV


Info (PHARMACY MONITORING -- do not chart) 1 each PRN DAILY  PRN MC SEE 

COMMENTS;  Start 4/7/20 at 10:45;  Status UNV


Sodium Chloride 90 meq/Potassium Chloride 15 meq/ Magnesium Sulfate 12 

meq/Calcium Gluconate 15 meq/ Multivitamins 10 ml/Chromium/ Copper/Manganese/ 

Seleni/Zn 0.5 ml/ Insulin Human Regular 25 unit/ Total Parenteral 

Nutrition/Amino Acids/Dextrose/ Fat Emulsion Intravenous 1,400 ml @  58.333 mls/

hr TPN  CONT IV  Last administered on 4/7/20at 22:13;  Start 4/7/20 at 22:00;  

Stop 4/8/20 at 21:59;  Status DC


Sodium Chloride 1,000 ml @  1,000 mls/hr Q1H PRN IV hypotension;  Start 4/8/20 

at 07:50;  Stop 4/8/20 at 13:49;  Status DC


Albumin Human 200 ml @  200 mls/hr 1X  ONCE IV ;  Start 4/8/20 at 08:00;  Stop 

4/8/20 at 08:53;  Status DC


Diphenhydramine HCl (Benadryl) 25 mg 1X PRN  PRN IV ITCHING;  Start 4/8/20 at 

08:00;  Stop 4/9/20 at 07:59;  Status DC


Diphenhydramine HCl (Benadryl) 25 mg 1X PRN  PRN IV ITCHING;  Start 4/8/20 at 

08:00;  Stop 4/9/20 at 07:59;  Status DC


Info (PHARMACY MONITORING -- do not chart) 1 each PRN DAILY  PRN MC SEE 

COMMENTS;  Start 4/8/20 at 08:00;  Stop 4/9/20 at 08:16;  Status DC


Albumin Human 50 ml @ 50 mls/hr 1X  ONCE IV ;  Start 4/8/20 at 08:53;  Stop 

4/8/20 at 08:56;  Status DC


Albumin Human 200 ml @  50 mls/hr PRN 1X  PRN IV HYPOTENSION Last administered 

on 4/14/20at 11:54;  Start 4/8/20 at 09:00


Meropenem 500 mg/ Sodium Chloride 50 ml @  100 mls/hr Q12H IV  Last administered

on 4/28/20at 10:45;  Start 4/8/20 at 10:00;  Stop 4/28/20 at 12:37;  Status DC


Sodium Chloride 90 meq/Magnesium Sulfate 12 meq/ Calcium Gluconate 15 meq/ 

Multivitamins 10 ml/Chromium/ Copper/Manganese/ Seleni/Zn 0.5 ml/ Insulin Human 

Regular 25 unit/ Total Parenteral Nutrition/Amino Acids/Dextrose/ Fat Emulsion 

Intravenous 1,400 ml @  58.333 mls/ hr TPN  CONT IV  Last administered on 4/8/ 20at 21:41;  Start 4/8/20 at 22:00;  Stop 4/9/20 at 21:59;  Status DC


Sodium Chloride 1,000 ml @  1,000 mls/hr Q1H PRN IV hypotension;  Start 4/9/20 

at 07:58;  Stop 4/9/20 at 13:57;  Status DC


Albumin Human 200 ml @  200 mls/hr 1X PRN  PRN IV Hypotension Last administered 

on 4/9/20at 09:30;  Start 4/9/20 at 08:00;  Stop 4/9/20 at 13:59;  Status DC


Sodium Chloride 1,000 ml @  400 mls/hr Q2H30M PRN IV PATENCY;  Start 4/9/20 at 

07:58;  Stop 4/9/20 at 19:57;  Status DC


Info (PHARMACY MONITORING -- do not chart) 1 each PRN DAILY  PRN MC SEE 

COMMENTS;  Start 4/9/20 at 08:00;  Status Cancel


Info (PHARMACY MONITORING -- do not chart) 1 each PRN DAILY  PRN MC SEE 

COMMENTS;  Start 4/9/20 at 08:15;  Status UNV


Sodium Chloride 90 meq/Potassium Phosphate 5 mmol/ Magnesium Sulfate 12 

meq/Calcium Gluconate 15 meq/ Multivitamins 10 ml/Chromium/ Copper/Manganese/ 

Seleni/Zn 0.5 ml/ Insulin Human Regular 30 unit/ Total Parenteral 

Nutrition/Amino Acids/Dextrose/ Fat Emulsion Intravenous 1,400 ml @  58.333 mls/

hr TPN  CONT IV  Last administered on 4/9/20at 22:08;  Start 4/9/20 at 22:00;  

Stop 4/10/20 at 21:59;  Status DC


Linezolid/Dextrose 300 ml @  300 mls/hr Q12HR IV  Last administered on 4/20/20at

20:40;  Start 4/10/20 at 11:00;  Stop 4/21/20 at 08:10;  Status DC


Sodium Chloride 90 meq/Potassium Phosphate 15 mmol/ Magnesium Sulfate 12 

meq/Calcium Gluconate 15 meq/ Multivitamins 10 ml/Chromium/ Copper/Manganese/ 

Seleni/Zn 0.5 ml/ Insulin Human Regular 30 unit/ Total Parenteral 

Nutrition/Amino Acids/Dextrose/ Fat Emulsion Intravenous 1,400 ml @  58.333 mls/

hr TPN  CONT IV  Last administered on 4/10/20at 21:49;  Start 4/10/20 at 22:00; 

Stop 4/11/20 at 21:59;  Status DC


Sodium Chloride 90 meq/Potassium Phosphate 15 mmol/ Magnesium Sulfate 12 

meq/Calcium Gluconate 15 meq/ Multivitamins 10 ml/Chromium/ Copper/Manganese/ 

Seleni/Zn 0.5 ml/ Insulin Human Regular 40 unit/ Total Parenteral 

Nutrition/Amino Acids/Dextrose/ Fat Emulsion Intravenous 1,400 ml @  58.333 mls/

hr TPN  CONT IV  Last administered on 4/11/20at 21:21;  Start 4/11/20 at 22:00; 

Stop 4/12/20 at 21:59;  Status DC


Sodium Chloride 1,000 ml @  1,000 mls/hr Q1H PRN IV hypotension;  Start 4/11/20 

at 13:26;  Stop 4/11/20 at 19:25;  Status DC


Albumin Human 200 ml @  200 mls/hr 1X PRN  PRN IV Hypotension Last administered 

on 4/11/20at 15:00;  Start 4/11/20 at 13:30;  Stop 4/11/20 at 19:29;  Status DC


Sodium Chloride (Normal Saline Flush) 10 ml 1X PRN  PRN IV AP catheter pack;  

Start 4/11/20 at 13:30;  Stop 4/12/20 at 13:29;  Status DC


Sodium Chloride (Normal Saline Flush) 10 ml 1X PRN  PRN IV  catheter pack;  

Start 4/11/20 at 13:30;  Stop 4/12/20 at 13:29;  Status DC


Sodium Chloride 1,000 ml @  400 mls/hr Q2H30M PRN IV PATENCY;  Start 4/11/20 at 

13:26;  Stop 4/12/20 at 01:25;  Status DC


Info (PHARMACY MONITORING -- do not chart) 1 each PRN DAILY  PRN MC SEE 

COMMENTS;  Start 4/11/20 at 13:30;  Stop 4/11/20 at 13:33;  Status DC


Info (PHARMACY MONITORING -- do not chart) 1 each PRN DAILY  PRN MC SEE 

COMMENTS;  Start 4/11/20 at 13:30;  Stop 4/11/20 at 13:34;  Status DC


Sodium Chloride 90 meq/Potassium Phosphate 19 mmol/ Magnesium Sulfate 12 

meq/Calcium Gluconate 15 meq/ Multivitamins 10 ml/Chromium/ Copper/Manganese/ 

Seleni/Zn 0.5 ml/ Insulin Human Regular 40 unit/ Total Parenteral 

Nutrition/Amino Acids/Dextrose/ Fat Emulsion Intravenous 1,400 ml @  58.333 mls/

hr TPN  CONT IV  Last administered on 4/12/20at 21:54;  Start 4/12/20 at 22:00; 

Stop 4/13/20 at 21:59;  Status DC


Sodium Chloride 1,000 ml @  1,000 mls/hr Q1H PRN IV hypotension;  Start 4/13/20 

at 09:35;  Stop 4/13/20 at 15:34;  Status DC


Albumin Human 200 ml @  200 mls/hr 1X PRN  PRN IV Hypotension;  Start 4/13/20 at

09:45;  Stop 4/13/20 at 15:44;  Status DC


Diphenhydramine HCl (Benadryl) 25 mg 1X PRN  PRN IV ITCHING;  Start 4/13/20 at 

09:45;  Stop 4/14/20 at 09:44;  Status DC


Diphenhydramine HCl (Benadryl) 25 mg 1X PRN  PRN IV ITCHING;  Start 4/13/20 at 

09:45;  Stop 4/14/20 at 09:44;  Status DC


Sodium Chloride 1,000 ml @  400 mls/hr Q2H30M PRN IV PATENCY;  Start 4/13/20 at 

09:35;  Stop 4/13/20 at 21:34;  Status DC


Info (PHARMACY MONITORING -- do not chart) 1 each PRN DAILY  PRN MC SEE 

COMMENTS;  Start 4/13/20 at 09:45;  Status Cancel


Sodium Chloride 100 meq/Potassium Phosphate 19 mmol/ Magnesium Sulfate 12 

meq/Calcium Gluconate 15 meq/ Multivitamins 10 ml/Chromium/ Copper/Manganese/ 

Seleni/Zn 0.5 ml/ Insulin Human Regular 40 unit/ Potassium Chloride 20 meq/ 

Total Parenteral Nutrition/Amino Acids/Dextrose/ Fat Emulsion Intravenous 1,400 

ml @  58.333 mls/ hr TPN  CONT IV  Last administered on 4/13/20at 22:02;  Start 

4/13/20 at 22:00;  Stop 4/14/20 at 21:59;  Status DC


Furosemide (Lasix) 40 mg 1X  ONCE IVP  Last administered on 4/13/20at 14:39;  

Start 4/13/20 at 14:30;  Stop 4/13/20 at 14:31;  Status DC


Metronidazole 100 ml @  100 mls/hr Q8HRS IV  Last administered on 4/21/20at 

06:04;  Start 4/14/20 at 10:00;  Stop 4/21/20 at 08:10;  Status DC


Sodium Chloride 1,000 ml @  1,000 mls/hr Q1H PRN IV hypotension;  Start 4/14/20 

at 08:00;  Stop 4/14/20 at 13:59;  Status DC


Albumin Human 200 ml @  200 mls/hr 1X PRN  PRN IV Hypotension;  Start 4/14/20 at

08:00;  Stop 4/14/20 at 13:59;  Status DC


Sodium Chloride 1,000 ml @  400 mls/hr Q2H30M PRN IV PATENCY;  Start 4/14/20 at 

08:00;  Stop 4/14/20 at 19:59;  Status DC


Info (PHARMACY MONITORING -- do not chart) 1 each PRN DAILY  PRN MC SEE COMMENTS

;  Start 4/14/20 at 11:30;  Status UNV


Info (PHARMACY MONITORING -- do not chart) 1 each PRN DAILY  PRN MC SEE 

COMMENTS;  Start 4/14/20 at 11:30;  Stop 4/16/20 at 12:13;  Status DC


Sodium Chloride 100 meq/Potassium Phosphate 19 mmol/ Magnesium Sulfate 12 m

eq/Calcium Gluconate 15 meq/ Multivitamins 10 ml/Chromium/ Copper/Manganese/ 

Seleni/Zn 0.5 ml/ Insulin Human Regular 40 unit/ Potassium Chloride 20 meq/ 

Total Parenteral Nutrition/Amino Acids/Dextrose/ Fat Emulsion Intravenous 1,400 

ml @  58.333 mls/ hr TPN  CONT IV  Last administered on 4/14/20at 21:52;  Start 

4/14/20 at 22:00;  Stop 4/15/20 at 21:59;  Status DC


Sodium Chloride (Normal Saline Flush) 10 ml QSHIFT  PRN IV AFTER MEDS AND BLOOD 

DRAWS;  Start 4/14/20 at 15:00


Sodium Chloride (Normal Saline Flush) 10 ml PRN Q5MIN  PRN IV AFTER MEDS AND 

BLOOD DRAWS;  Start 4/14/20 at 15:00


Sodium Chloride (Normal Saline Flush) 20 ml PRN Q5MIN  PRN IV AFTER MEDS AND 

BLOOD DRAWS;  Start 4/14/20 at 15:00


Sodium Chloride 100 meq/Potassium Phosphate 19 mmol/ Magnesium Sulfate 12 

meq/Calcium Gluconate 15 meq/ Multivitamins 10 ml/Chromium/ Copper/Manganese/ 

Seleni/Zn 0.5 ml/ Insulin Human Regular 40 unit/ Potassium Chloride 20 meq/ 

Total Parenteral Nutrition/Amino Acids/Dextrose/ Fat Emulsion Intravenous 1,400 

ml @  58.333 mls/ hr TPN  CONT IV  Last administered on 4/15/20at 21:20;  Start 

4/15/20 at 22:00;  Stop 4/16/20 at 21:59;  Status DC


Lidocaine HCl (Buffered Lidocaine 1%) 3 ml STK-MED ONCE .ROUTE ;  Start 4/15/20 

at 13:16;  Stop 4/15/20 at 13:16;  Status DC


Lidocaine HCl (Buffered Lidocaine 1%) 6 ml 1X  ONCE INJ  Last administered on 

4/15/20at 13:45;  Start 4/15/20 at 13:30;  Stop 4/15/20 at 13:31;  Status DC


Albumin Human 100 ml @  100 mls/hr 1X  ONCE IV  Last administered on 4/15/20at 

15:41;  Start 4/15/20 at 15:00;  Stop 4/15/20 at 15:59;  Status DC


Albumin Human 50 ml @ 50 mls/hr 1X  ONCE IV  Last administered on 4/15/20at 

15:00;  Start 4/15/20 at 15:00;  Stop 4/15/20 at 15:59;  Status DC


Info (PHARMACY MONITORING -- do not chart) 1 each PRN DAILY  PRN MC SEE COMME

NTS;  Start 4/16/20 at 11:30;  Status Cancel


Info (PHARMACY MONITORING -- do not chart) 1 each PRN DAILY  PRN MC SEE 

COMMENTS;  Start 4/16/20 at 11:30;  Status UNV


Sodium Chloride 100 meq/Potassium Phosphate 10 mmol/ Magnesium Sulfate 12 

meq/Calcium Gluconate 15 meq/ Multivitamins 10 ml/Chromium/ Copper/Manganese/ 

Seleni/Zn 0.5 ml/ Insulin Human Regular 35 unit/ Potassium Chloride 20 meq/ 

Total Parenteral Nutrition/Amino Acids/Dextrose/ Fat Emulsion Intravenous 1,400 

ml @  58.333 mls/ hr TPN  CONT IV  Last administered on 4/16/20at 22:10;  Start 

4/16/20 at 22:00;  Stop 4/17/20 at 21:59;  Status DC


Sodium Chloride 100 meq/Potassium Phosphate 5 mmol/ Magnesium Sulfate 12 

meq/Calcium Gluconate 15 meq/ Multivitamins 10 ml/Chromium/ Copper/Manganese/ 

Seleni/Zn 0.5 ml/ Insulin Human Regular 35 unit/ Potassium Chloride 20 meq/ 

Total Parenteral Nutrition/Amino Acids/Dextrose/ Fat Emulsion Intravenous 1,400 

ml @  58.333 mls/ hr TPN  CONT IV  Last administered on 4/17/20at 22:59;  Start 

4/17/20 at 22:00;  Stop 4/18/20 at 21:59;  Status DC


Sodium Chloride 1,000 ml @  1,000 mls/hr Q1H PRN IV hypotension;  Start 4/18/20 

at 08:27;  Stop 4/18/20 at 14:26;  Status DC


Albumin Human 200 ml @  200 mls/hr 1X PRN  PRN IV Hypotension Last administered 

on 4/18/20at 09:18;  Start 4/18/20 at 08:30;  Stop 4/18/20 at 14:29;  Status DC


Sodium Chloride 1,000 ml @  400 mls/hr Q2H30M PRN IV PATENCY;  Start 4/18/20 at 

08:27;  Stop 4/18/20 at 20:26;  Status DC


Info (PHARMACY MONITORING -- do not chart) 1 each PRN DAILY  PRN MC SEE 

COMMENTS;  Start 4/18/20 at 08:30;  Status Cancel


Info (PHARMACY MONITORING -- do not chart) 1 each PRN DAILY  PRN MC SEE 

COMMENTS;  Start 4/18/20 at 08:30;  Stop 4/26/20 at 13:10;  Status DC


Sodium Chloride 100 meq/Potassium Chloride 40 meq/ Magnesium Sulfate 15 meq/

Calcium Gluconate 15 meq/ Multivitamins 10 ml/Chromium/ Copper/Manganese/ 

Seleni/Zn 0.5 ml/ Insulin Human Regular 35 unit/ Total Parenteral 

Nutrition/Amino Acids/Dextrose/ Fat Emulsion Intravenous 1,400 ml @  58.333 mls/

hr TPN  CONT IV  Last administered on 4/18/20at 22:00;  Start 4/18/20 at 22:00; 

Stop 4/19/20 at 21:59;  Status DC


Potassium Chloride/Water 100 ml @  100 mls/hr 1X  ONCE IV  Last administered on 

4/18/20at 17:28;  Start 4/18/20 at 14:45;  Stop 4/18/20 at 15:44;  Status DC


Sodium Chloride 100 meq/Potassium Chloride 40 meq/ Magnesium Sulfate 15 

meq/Calcium Gluconate 15 meq/ Multivitamins 10 ml/Chromium/ Copper/Manganese/ 

Seleni/Zn 0.5 ml/ Insulin Human Regular 35 unit/ Total Parenteral 

Nutrition/Amino Acids/Dextrose/ Fat Emulsion Intravenous 1,400 ml @  58.333 mls/

hr TPN  CONT IV  Last administered on 4/19/20at 22:46;  Start 4/19/20 at 22:00; 

Stop 4/20/20 at 21:59;  Status DC


Sodium Chloride 100 meq/Potassium Chloride 40 meq/ Magnesium Sulfate 20 

meq/Calcium Gluconate 15 meq/ Multivitamins 10 ml/Chromium/ Copper/Manganese/ 

Seleni/Zn 0.5 ml/ Insulin Human Regular 35 unit/ Total Parenteral 

Nutrition/Amino Acids/Dextrose/ Fat Emulsion Intravenous 1,400 ml @  58.333 mls/

hr TPN  CONT IV  Last administered on 4/20/20at 22:31;  Start 4/20/20 at 22:00; 

Stop 4/21/20 at 21:59;  Status DC


Fentanyl Citrate (Fentanyl 2ml Vial) 50 mcg PRN Q2HR  PRN IVP PAIN Last 

administered on 4/27/20at 13:32;  Start 4/20/20 at 21:00;  Stop 4/28/20 at 

12:53;  Status DC


Fentanyl Citrate (Fentanyl 2ml Vial) 25 mcg PRN Q2HR  PRN IVP PAIN;  Start 

4/20/20 at 21:00;  Stop 4/28/20 at 12:54;  Status DC


Enoxaparin Sodium (Lovenox 100mg Syringe) 100 mg Q12HR SQ ;  Start 4/21/20 at 

21:00;  Status UNV


Amino Acids/ Glycerin/ Electrolytes 1,000 ml @  75 mls/hr [id number] IV ;  Start 

4/20/20 at 21:15;  Status UNV


Sodium Chloride 1,000 ml @  1,000 mls/hr Q1H PRN IV hypotension;  Start 4/21/20 

at 07:56;  Stop 4/21/20 at 13:55;  Status DC


Albumin Human 200 ml @  200 mls/hr 1X PRN  PRN IV Hypotension Last administered 

on 4/21/20at 08:40;  Start 4/21/20 at 08:00;  Stop 4/21/20 at 13:59;  Status DC


Sodium Chloride 1,000 ml @  400 mls/hr Q2H30M PRN IV PATENCY;  Start 4/21/20 at 

07:56;  Stop 4/21/20 at 19:55;  Status DC


Info (PHARMACY MONITORING -- do not chart) 1 each PRN DAILY  PRN MC SEE 

COMMENTS;  Start 4/21/20 at 08:00;  Status UNV


Info (PHARMACY MONITORING -- do not chart) 1 each PRN DAILY  PRN MC SEE 

COMMENTS;  Start 4/21/20 at 08:00;  Status UNV


Daptomycin 430 mg/ Sodium Chloride 50 ml @  100 mls/hr Q24H IV  Last 

administered on 4/21/20at 12:35;  Start 4/21/20 at 09:00;  Stop 4/21/20 at 

12:49;  Status DC


Sodium Chloride 100 meq/Potassium Chloride 40 meq/ Magnesium Sulfate 20 

meq/Calcium Gluconate 15 meq/ Multivitamins 10 ml/Chromium/ Copper/Manganese/ 

Seleni/Zn 0.5 ml/ Insulin Human Regular 35 unit/ Total Parenteral 

Nutrition/Amino Acids/Dextrose/ Fat Emulsion Intravenous 1,400 ml @  58.333 mls/

hr TPN  CONT IV  Last administered on 4/21/20at 21:26;  Start 4/21/20 at 22:00; 

Stop 4/22/20 at 21:59;  Status DC


Daptomycin 430 mg/ Sodium Chloride 50 ml @  100 mls/hr Q48H IV ;  Start 4/23/20 

at 09:00;  Stop 4/22/20 at 11:55;  Status DC


Sodium Chloride 100 meq/Potassium Chloride 40 meq/ Magnesium Sulfate 20 

meq/Calcium Gluconate 15 meq/ Multivitamins 10 ml/Chromium/ Copper/Manganese/ 

Seleni/Zn 0.5 ml/ Insulin Human Regular 35 unit/ Total Parenteral 

Nutrition/Amino Acids/Dextrose/ Fat Emulsion Intravenous 1,400 ml @  58.333 mls/

hr TPN  CONT IV  Last administered on 4/22/20at 22:27;  Start 4/22/20 at 22:00; 

Stop 4/23/20 at 21:59;  Status DC


Daptomycin 430 mg/ Sodium Chloride 50 ml @  100 mls/hr Q24H IV  Last 

administered on 4/24/20at 15:07;  Start 4/22/20 at 13:00;  Stop 4/25/20 at 

13:15;  Status DC


Sodium Chloride 100 meq/Potassium Chloride 40 meq/ Magnesium Sulfate 20 

meq/Calcium Gluconate 10 meq/ Multivitamins 10 ml/Chromium/ Copper/Manganese/ 

Seleni/Zn 0.5 ml/ Insulin Human Regular 35 unit/ Total Parenteral 

Nutrition/Amino Acids/Dextrose/ Fat Emulsion Intravenous 1,400 ml @  58.333 mls/

hr TPN  CONT IV  Last administered on 4/24/20at 00:06;  Start 4/23/20 at 22:00; 

Stop 4/24/20 at 21:59;  Status DC


Alteplase, Recombinant (Cathflo For Central Catheter Clearance) 1 mg 1X  ONCE I

NT CAT  Last administered on 4/24/20at 11:44;  Start 4/24/20 at 10:45;  Stop 

4/24/20 at 10:46;  Status DC


Ondansetron HCl (Zofran) 4 mg PRN Q6HRS  PRN IV NAUSEA/VOMITING;  Start 4/27/20 

at 07:00;  Stop 4/28/20 at 06:59;  Status DC


Fentanyl Citrate (Fentanyl 2ml Vial) 25 mcg PRN Q5MIN  PRN IV MILD PAIN 1-3;  

Start 4/27/20 at 07:00;  Stop 4/28/20 at 06:59;  Status DC


Fentanyl Citrate (Fentanyl 2ml Vial) 50 mcg PRN Q5MIN  PRN IV MODERATE TO SEVERE

PAIN Last administered on 4/27/20at 10:17;  Start 4/27/20 at 07:00;  Stop 

4/28/20 at 06:59;  Status DC


Ringer's Solution 1,000 ml @  30 mls/hr Q24H IV ;  Start 4/27/20 at 07:00;  Stop

4/27/20 at 18:59;  Status DC


Lidocaine HCl (Xylocaine-Mpf 1% 2ml Vial) 2 ml PRN 1X  PRN ID PRIOR TO IV START;

 Start 4/27/20 at 07:00;  Stop 4/28/20 at 06:59;  Status DC


Prochlorperazine Edisylate (Compazine) 5 mg PACU PRN  PRN IV NAUSEA, MRX1;  

Start 4/27/20 at 07:00;  Stop 4/28/20 at 06:59;  Status DC


Sodium Acetate 50 meq/Potassium Acetate 55 meq/ Magnesium Sulfate 20 meq/Calcium

Gluconate 10 meq/ Multivitamins 10 ml/Chromium/ Copper/Manganese/ Seleni/Zn 0.5 

ml/ Insulin Human Regular 35 unit/ Total Parenteral Nutrition/Amino 

Acids/Dextrose/ Fat Emulsion Intravenous 1,400 ml @  58.333 mls/ hr TPN  CONT IV

;  Start 4/24/20 at 22:00;  Stop 4/24/20 at 14:15;  Status DC


Sodium Acetate 50 meq/Potassium Acetate 55 meq/ Magnesium Sulfate 20 meq/Calcium

Gluconate 10 meq/ Multivitamins 10 ml/Chromium/ Copper/Manganese/ Seleni/Zn 0.5 

ml/ Insulin Human Regular 35 unit/ Total Parenteral Nutrition/Amino 

Acids/Dextrose/ Fat Emulsion Intravenous 1,800 ml @  75 mls/hr TPN  CONT IV  

Last administered on 4/24/20at 22:38;  Start 4/24/20 at 22:00;  Stop 4/25/20 at 

21:59;  Status DC


Sodium Chloride 1,000 ml @  1,000 mls/hr Q1H PRN IV hypotension;  Start 4/24/20 

at 15:31;  Stop 4/24/20 at 21:30;  Status DC


Diphenhydramine HCl (Benadryl) 25 mg 1X PRN  PRN IV ITCHING;  Start 4/24/20 at 

15:45;  Stop 4/25/20 at 15:44;  Status DC


Diphenhydramine HCl (Benadryl) 25 mg 1X PRN  PRN IV ITCHING;  Start 4/24/20 at 

15:45;  Stop 4/25/20 at 15:44;  Status DC


Sodium Chloride 1,000 ml @  400 mls/hr Q2H30M PRN IV PATENCY;  Start 4/24/20 at 

15:31;  Stop 4/25/20 at 03:30;  Status DC


Info (PHARMACY MONITORING -- do not chart) 1 each PRN DAILY  PRN MC SEE 

COMMENTS;  Start 4/24/20 at 15:45


Sodium Acetate 50 meq/Potassium Acetate 55 meq/ Magnesium Sulfate 20 meq/Calcium

Gluconate 10 meq/ Multivitamins 10 ml/Chromium/ Copper/Manganese/ Seleni/Zn 0.5 

ml/ Insulin Human Regular 35 unit/ Total Parenteral Nutrition/Amino 

Acids/Dextrose/ Fat Emulsion Intravenous 1,800 ml @  75 mls/hr TPN  CONT IV  

Last administered on 4/25/20at 22:03;  Start 4/25/20 at 22:00;  Stop 4/26/20 at 

21:59;  Status DC


Daptomycin 430 mg/ Sodium Chloride 50 ml @  100 mls/hr Q24H IV  Last 

administered on 4/30/20at 13:00;  Start 4/25/20 at 13:00;  Stop 4/30/20 at 

20:58;  Status DC


Heparin Sodium (Porcine) 1000 unit/Sodium Chloride 1,001 ml @  1,001 mls/hr 1X  

ONCE IRR ;  Start 4/27/20 at 06:00;  Stop 4/27/20 at 06:59;  Status DC


Potassium Acetate 55 meq/Magnesium Sulfate 20 meq/ Calcium Gluconate 10 meq/ 

Multivitamins 10 ml/Chromium/ Copper/Manganese/ Seleni/Zn 0.5 ml/ Insulin Human 

Regular 35 unit/ Total Parenteral Nutrition/Amino Acids/Dextrose/ Fat Emulsion 

Intravenous 1,920 ml @  80 mls/hr TPN  CONT IV  Last administered on 4/26/20at 

22:10;  Start 4/26/20 at 22:00;  Stop 4/27/20 at 21:59;  Status DC


Dexamethasone Sodium Phosphate (Decadron) 4 mg STK-MED ONCE .ROUTE ;  Start 

4/27/20 at 10:56;  Stop 4/27/20 at 10:57;  Status DC


Ondansetron HCl (Zofran) 4 mg STK-MED ONCE .ROUTE ;  Start 4/27/20 at 10:56;  

Stop 4/27/20 at 10:57;  Status DC


Rocuronium Bromide (Zemuron) 50 mg STK-MED ONCE .ROUTE ;  Start 4/27/20 at 

10:56;  Stop 4/27/20 at 10:57;  Status DC


Fentanyl Citrate (Fentanyl 2ml Vial) 100 mcg STK-MED ONCE .ROUTE ;  Start 

4/27/20 at 10:56;  Stop 4/27/20 at 10:57;  Status DC


Bupivacaine HCl/ Epinephrine Bitart (Sensorcain-Epi 0.5%-1:438525 Mpf) 30 ml 

STK-MED ONCE .ROUTE  Last administered on 4/27/20at 12:01;  Start 4/27/20 at 

10:58;  Stop 4/27/20 at 10:58;  Status DC


Cellulose (Surgicel Hemostat 2x14) 1 each STK-MED ONCE .ROUTE ;  Start 4/27/20 

at 10:58;  Stop 4/27/20 at 10:59;  Status DC


Iohexol (Omnipaque 300 Mg/ml) 50 ml STK-MED ONCE .ROUTE ;  Start 4/27/20 at 

10:58;  Stop 4/27/20 at 10:59;  Status DC


Cellulose (Surgicel Hemostat 4x8) 1 each STK-MED ONCE .ROUTE ;  Start 4/27/20 at

10:58;  Stop 4/27/20 at 10:59;  Status DC


Bisacodyl (Dulcolax Supp) 10 mg STK-MED ONCE .ROUTE ;  Start 4/27/20 at 10:59;  

Stop 4/27/20 at 10:59;  Status DC


Heparin Sodium (Porcine) 1000 unit/Sodium Chloride 1,001 ml @  1,001 mls/hr 1X  

ONCE IRR ;  Start 4/27/20 at 12:00;  Stop 4/27/20 at 12:59;  Status DC


Propofol 20 ml @ As Directed STK-MED ONCE IV ;  Start 4/27/20 at 11:05;  Stop 

4/27/20 at 11:05;  Status DC


Sevoflurane (Ultane) 90 ml STK-MED ONCE IH ;  Start 4/27/20 at 11:05;  Stop 

4/27/20 at 11:05;  Status DC


Sevoflurane (Ultane) 60 ml STK-MED ONCE IH ;  Start 4/27/20 at 12:26;  Stop 

4/27/20 at 12:27;  Status DC


Propofol 20 ml @ As Directed STK-MED ONCE IV ;  Start 4/27/20 at 12:26;  Stop 

4/27/20 at 12:27;  Status DC


Phenylephrine HCl (PHENYLEPHRINE in 0.9% NACL PF) 1 mg STK-MED ONCE IV ;  Start 

4/27/20 at 12:34;  Stop 4/27/20 at 12:34;  Status DC


Heparin Sodium (Porcine) (Heparin Sodium) 5,000 unit Q12HR SQ  Last administered

on 5/2/20at 21:54;  Start 4/27/20 at 21:00


Sodium Chloride (Normal Saline Flush) 3 ml QSHIFT  PRN IV AFTER MEDS AND BLOOD 

DRAWS;  Start 4/27/20 at 13:45


Naloxone HCl (Narcan) 0.4 mg PRN Q2MIN  PRN IV SEE INSTRUCTIONS;  Start 4/27/20 

at 13:45


Sodium Chloride 1,000 ml @  25 mls/hr Q24H IV  Last administered on 5/1/20at 

12:35;  Start 4/27/20 at 13:37


Naloxone HCl (Narcan) 0.4 mg PRN Q2MIN  PRN IV SEE INSTRUCTIONS;  Start 4/27/20 

at 14:30;  Status UNV


Sodium Chloride 1,000 ml @  25 mls/hr Q24H IV ;  Start 4/27/20 at 14:30;  Status

UNV


Hydromorphone HCl 30 ml @ 0 mls/hr CONT PRN  PRN IV PER PROTOCOL Last 

administered on 5/2/20at 16:08;  Start 4/27/20 at 14:30


Potassium Acetate 55 meq/Magnesium Sulfate 20 meq/ Calcium Gluconate 10 meq/ 

Multivitamins 10 ml/Chromium/ Copper/Manganese/ Seleni/Zn 0.5 ml/ Insulin Human 

Regular 35 unit/ Total Parenteral Nutrition/Amino Acids/Dextrose/ Fat Emulsion 

Intravenous 1,920 ml @  80 mls/hr TPN  CONT IV  Last administered on 4/27/20at 

22:01;  Start 4/27/20 at 22:00;  Stop 4/28/20 at 21:59;  Status DC


Bumetanide (Bumex) 2 mg [id number] IV  Last administered on 5/1/20at 13:50;  Start 

4/28/20 at 14:00;  Stop 5/2/20 at 14:10;  Status DC


Meropenem 1 gm/ Sodium Chloride 100 ml @  200 mls/hr Q8HRS IV  Last administered

on 5/3/20at 05:27;  Start 4/28/20 at 14:00


Potassium Acetate 55 meq/Magnesium Sulfate 20 meq/ Calcium Gluconate 10 meq/ 

Multivitamins 10 ml/Chromium/ Copper/Manganese/ Seleni/Zn 0.5 ml/ Insulin Human 

Regular 35 unit/ Total Parenteral Nutrition/Amino Acids/Dextrose/ Fat Emulsion 

Intravenous 1,920 ml @  80 mls/hr TPN  CONT IV  Last administered on 4/28/20at 

22:02;  Start 4/28/20 at 22:00;  Stop 4/29/20 at 21:59;  Status DC


Hydromorphone HCl (Dilaudid Standard PCA) 12 mg STK-MED ONCE IV ;  Start 4/27/20

at 14:35;  Stop 4/28/20 at 13:53;  Status DC


Artificial Tears (Artificial Tears) 1 drop PRN Q15MIN  PRN OU DRY EYE Last 

administered on 5/1/20at 12:34;  Start 4/29/20 at 05:30


Hydromorphone HCl (Dilaudid Standard PCA) 12 mg STK-MED ONCE IV ;  Start 4/28/20

at 12:05;  Stop 4/29/20 at 09:15;  Status DC


Potassium Acetate 65 meq/Magnesium Sulfate 20 meq/ Calcium Gluconate 10 meq/ 

Multivitamins 10 ml/Chromium/ Copper/Manganese/ Seleni/Zn 0.5 ml/ Insulin Human 

Regular 30 unit/ Total Parenteral Nutrition/Amino Acids/Dextrose/ Fat Emulsion 

Intravenous 1,920 ml @  80 mls/hr TPN  CONT IV  Last administered on 4/29/20at 

22:22;  Start 4/29/20 at 22:00;  Stop 4/30/20 at 21:59;  Status DC


Cyclobenzaprine HCl (Flexeril) 10 mg PRN Q6HRS  PRN PO MUSCLE SPASMS;  Start 

4/30/20 at 10:45


Potassium Acetate 55 meq/Magnesium Sulfate 20 meq/ Calcium Gluconate 10 meq/ 

Multivitamins 10 ml/Chromium/ Copper/Manganese/ Seleni/Zn 0.5 ml/ Insulin Human 

Regular 30 unit/ Total Parenteral Nutrition/Amino Acids/Dextrose/ Fat Emulsion 

Intravenous 1,920 ml @  80 mls/hr TPN  CONT IV  Last administered on 5/1/20at 

01:00;  Start 4/30/20 at 22:00;  Stop 5/1/20 at 21:59;  Status DC


Magnesium Sulfate 50 ml @ 25 mls/hr 1X  ONCE IV  Last administered on 4/30/20at 

17:18;  Start 4/30/20 at 12:45;  Stop 4/30/20 at 14:44;  Status DC


Potassium Chloride/Water 100 ml @  100 mls/hr 1X  ONCE IV  Last administered on 

5/1/20at 11:27;  Start 5/1/20 at 12:00;  Stop 5/1/20 at 12:59;  Status DC


Hydromorphone HCl (Dilaudid Standard PCA) 12 mg STK-MED ONCE IV ;  Start 4/29/20

at 10:50;  Stop 5/1/20 at 11:02;  Status DC


Hydromorphone HCl (Dilaudid Standard PCA) 12 mg STK-MED ONCE IV ;  Start 4/30/20

at 13:47;  Stop 5/1/20 at 11:03;  Status DC


Potassium Acetate 30 meq/Magnesium Sulfate 20 meq/ Calcium Gluconate 10 meq/ 

Multivitamins 10 ml/Chromium/ Copper/Manganese/ Seleni/Zn 0.5 ml/ Insulin Human 

Regular 30 unit/ Potassium Chloride 30 meq/ Total Parenteral Nutrition/Amino 

Acids/Dextrose/ Fat Emulsion Intravenous 1,920 ml @  80 mls/hr TPN  CONT IV  

Last administered on 5/1/20at 22:34;  Start 5/1/20 at 22:00;  Stop 5/2/20 at 

21:59;  Status DC


Potassium Chloride/Water 100 ml @  100 mls/hr Q1H IV  Last administered on 

5/2/20at 13:05;  Start 5/2/20 at 07:00;  Stop 5/2/20 at 10:59;  Status DC


Magnesium Sulfate 50 ml @ 25 mls/hr 1X  ONCE IV  Last administered on 5/2/20at 

10:34;  Start 5/2/20 at 10:30;  Stop 5/2/20 at 12:29;  Status DC


Potassium Chloride 75 meq/ Magnesium Sulfate 20 meq/Calcium Gluconate 10 meq/ 

Multivitamins 10 ml/Chromium/ Copper/Manganese/ Seleni/Zn 0.5 ml/ Insulin Human 

Regular 30 unit/ Total Parenteral Nutrition/Amino Acids/Dextrose/ Fat Emulsion 

Intravenous 1,920 ml @  80 mls/hr TPN  CONT IV  Last administered on 5/2/20at 

21:51;  Start 5/2/20 at 22:00;  Stop 5/3/20 at 21:59





Active Scripts


Active


Reported


Bisoprolol Fumarate 5 Mg Tablet 10 Mg PO DAILY


Vitals/I & O





Vital Sign - Last 24 Hours








 5/2/20 5/2/20 5/2/20 5/2/20





 09:00 10:00 11:00 12:00


 


Pulse 136 136 126 


 


Resp 38 32 29 


 


B/P (MAP) 173/85 (114) 163/76 (105) 113/64 (80) 


 


Pulse Ox 100 100 100 


 


O2 Delivery Tracheal Collar Tracheal Collar Tracheal Collar Trach Collar





 5/2/20 5/2/20 5/2/20 5/2/20





 12:00 13:00 14:00 15:00


 


Temp 98.5   





 98.5   


 


Pulse 138 126 112 102


 


Resp 36 36 28 34


 


B/P (MAP) 144/77 (99) 91/59 (70) 118/59 (78) 104/62 (76)


 


Pulse Ox 100 100 100 100


 


O2 Delivery Tracheal Collar Tracheal Collar Tracheal Collar Tracheal Collar


 


    





    





 5/2/20 5/2/20 5/2/20 5/2/20





 16:00 16:00 16:00 16:38


 


Temp   98.6 





   98.6 


 


Pulse   142 


 


Resp   32 


 


B/P (MAP)   134/80 (98) 


 


Pulse Ox   100 100


 


O2 Delivery Trach Collar Trach Collar Tracheal Collar 


 


O2 Flow Rate    10.0


 


    





    





 5/2/20 5/2/20 5/2/20 5/2/20





 17:00 18:00 19:00 20:00


 


Pulse 114 124 113 


 


Resp 32 33 28 


 


B/P (MAP) 132/77 (95) 132/77 (95) 118/70 (86) 


 


Pulse Ox 100 100 99 


 


O2 Delivery Tracheal Collar Tracheal Collar Tracheal Collar Mechanical 

Ventilator





 5/2/20 5/2/20 5/2/20 5/2/20





 20:00 21:00 22:00 23:00


 


Temp 97.7   





 97.7   


 


Pulse 107 120 92 86


 


Resp 25 26 25 25


 


B/P (MAP) 112/61 (78) 141/56 (84) 95/54 (68) 95/47 (63)


 


Pulse Ox 97 98 100 100


 


O2 Delivery Tracheal Collar Tracheal Collar Tracheal Collar Tracheal Collar


 


    





    





 5/3/20 5/3/20 5/3/20 5/3/20





 00:00 00:00 01:00 02:00


 


Temp 97.4   





 97.4   


 


Pulse 92  74 132


 


Resp 26  25 25


 


B/P (MAP) 99/59 (72)  89/39 (56) 166/78 (107)


 


Pulse Ox 100  100 90


 


O2 Delivery Tracheal Collar Mechanical Ventilator Tracheal Collar Tracheal 

Collar


 


    





    





 5/3/20 5/3/20 5/3/20 5/3/20





 03:00 04:00 04:00 05:00


 


Temp   97.7 





   97.7 


 


Pulse 100  78 78


 


Resp 25  25 25


 


B/P (MAP) 95/49 (64)  84/44 (57) 99/48 (65)


 


Pulse Ox 97  99 99


 


O2 Delivery Tracheal Collar Mechanical Ventilator Tracheal Collar Tracheal 

Collar


 


    





    





 5/3/20 5/3/20  





 06:00 08:08  


 


Pulse 103   


 


Resp 25   


 


B/P (MAP) 115/65 (82)   


 


Pulse Ox 100 95  


 


O2 Delivery Tracheal Collar Ventilator  














Intake and Output   


 


 5/2/20 5/2/20 5/3/20





 15:00 23:00 07:00


 


Intake Total 350 ml 1774 ml 1628.5 ml


 


Output Total 775 ml 1045 ml 765 ml


 


Balance -425 ml 729 ml 863.5 ml











Hemodynamically unstable?:  No


Is patient in severe pain?:  No


Is NPO status required?:  No











GAETANO GONCALVES MD         May 3, 2020 09:00

## 2020-05-03 NOTE — PDOC
Infectious Disease Note


Subjective:


Subjective


Patient resting comfortably


No fever last 24-hour


on trach with vent


Discussed with RN





Vital Signs:


Vital Signs





Vital Signs








  Date Time  Temp Pulse Resp B/P (MAP) Pulse Ox O2 Delivery O2 Flow Rate FiO2


 


5/3/20 06:00  103 25 115/65 (82) 100 Tracheal Collar  


 


5/3/20 04:00 97.7       





 97.7       


 


5/2/20 16:38       10.0 











Physical Exam:


PHYSICAL EXAM


GENERAL: Propped up in bed, sedated weak appearing 


HEENT: Pupils equal, + NGT, oral cavity dry 


NECK:  Trach/vent 


LUNGS: rhonchi 


HEART:  S1, S2, regular 


ABDOMEN: Distended, hypoactive BS, drain placement (4/27 )


: [place] (4/14)


EXTREMITIES: Generalized edema, no cyanosis, SCDs bilaterally 


DERMATOLOGIC:  Warm and dry.  No generalized rash.  


CENTRAL NERVOUS SYSTEM: Extremely weak, nods to few simple questions 


PICC line in place April 30, 2020 clean


HDC has been removed


LIJ removed





Medications:


Inpatient Meds:





Current Medications








 Medications


  (Trade)  Dose


 Ordered  Sig/Yee  Start Time


 Stop Time Status Last Admin


Dose Admin


 


 Acetaminophen


  (Tylenol Supp)  650 mg  PRN Q6HRS  PRN  3/24/20 10:30


    4/27/20 00:32


650 MG


 


 Acetaminophen


  (Tylenol)  650 mg  PRN Q6HRS  PRN  3/21/20 03:36


    4/16/20 19:56


650 MG


 


 Albumin Human  200 ml @ 


 200 mls/hr  1X PRN  PRN  4/21/20 08:00


 4/21/20 13:59 DC 4/21/20 08:40


200 MLS/HR


 


 Albuterol Sulfate


  (Ventolin Neb


 Soln)  2.5 mg  1X  ONCE  3/17/20 22:30


 3/17/20 22:31 DC 3/18/20 00:56


2.5 MG


 


 Alteplase,


 Recombinant


  (Cathflo For


 Central Catheter


 Clearance)  1 mg  1X  ONCE  4/24/20 10:45


 4/24/20 10:46 DC 4/24/20 11:44


1 MG


 


 Amino Acids/


 Glycerin/


 Electrolytes  1,000 ml @ 


 75 mls/hr  [id number]  4/20/20 21:15


   UNV  





 


 Artificial Tears


  (Artificial


 Tears)  1 drop  PRN Q15MIN  PRN  4/29/20 05:30


    5/1/20 12:34


1 DROP


 


 Atenolol


  (Tenormin)  100 mg  DAILY  3/17/20 09:00


 3/16/20 20:08 DC  





 


 Atropine Sulfate


  (ATROPINE 0.5mg


 SYRINGE)  0.5 mg  PRN Q5MIN  PRN  4/2/20 08:15


     





 


 Benzocaine


  (Hurricaine One)  1 spray  1X  ONCE  3/20/20 14:30


 3/20/20 14:31 DC 3/20/20 16:38


1 SPRAY


 


 Bisacodyl


  (Dulcolax Supp)  10 mg  STK-MED ONCE  4/27/20 10:59


 4/27/20 10:59 DC  





 


 Bumetanide


  (Bumex)  2 mg  [id number]  4/28/20 14:00


 5/2/20 14:10 DC 5/1/20 13:50


2 MG


 


 Bupivacaine HCl/


 Epinephrine Bitart


  (Sensorcain-Epi


 0.5%-1:142907 Mpf)  30 ml  STK-MED ONCE  4/27/20 10:58


 4/27/20 10:58 DC 4/27/20 12:01


7 ML


 


 Calcium Carbonate/


 Glycine


  (Tums)  500 mg  PRN [id number]  PRN  3/18/20 17:45


     





 


 Calcium Chloride


 1000 mg/Sodium


 Chloride  110 ml @ 


 220 mls/hr  1X  ONCE  3/17/20 22:30


 3/17/20 22:59 DC 3/17/20 22:11


220 MLS/HR


 


 Calcium Chloride


 3000 mg/Sodium


 Chloride  1,030 ml @ 


 50 mls/hr  [id number]  3/19/20 08:00


 3/21/20 15:23 DC 3/21/20 02:17


50 MLS/HR


 


 Calcium Gluconate


  (Calcium


 Gluconate)  2,000 mg  1X  ONCE  3/19/20 02:15


 3/19/20 02:16 DC 3/19/20 02:19


2,000 MG


 


 Calcium Gluconate


 1000 mg/Sodium


 Chloride  110 ml @ 


 220 mls/hr  1X  ONCE  3/18/20 03:30


 3/18/20 03:59 DC 3/18/20 03:21


220 MLS/HR


 


 Calcium Gluconate


 2000 mg/Sodium


 Chloride  120 ml @ 


 220 mls/hr  1X  ONCE  3/18/20 07:30


 3/18/20 08:02 DC 3/18/20 09:05


220 MLS/HR


 


 Cefepime HCl


  (Maxipime)  2 gm  Q12HR  3/25/20 09:00


 4/8/20 09:58 DC 4/7/20 20:56


2 GM


 


 Cellulose


  (Surgicel


 Fibrillar 1x2)  1 each  STK-MED ONCE  4/6/20 11:00


 4/6/20 11:01 DC  





 


 Cellulose


  (Surgicel


 Hemostat 2x14)  1 each  STK-MED ONCE  4/27/20 10:58


 4/27/20 10:59 DC  





 


 Cellulose


  (Surgicel


 Hemostat 4x8)  1 each  STK-MED ONCE  4/27/20 10:58


 4/27/20 10:59 DC  





 


 Cyclobenzaprine


 HCl


  (Flexeril)  10 mg  PRN Q6HRS  PRN  4/30/20 10:45


     





 


 Daptomycin 430 mg/


 Sodium Chloride  50 ml @ 


 100 mls/hr  Q24H  4/25/20 13:00


 4/30/20 20:58 DC 4/30/20 13:00


100 MLS/HR


 


 Daptomycin 500 mg/


 Sodium Chloride  50 ml @ 


 100 mls/hr  Q48H  3/25/20 08:30


 4/10/20 10:07 DC 4/10/20 09:57


100 MLS/HR


 


 Dexamethasone


 Sodium Phosphate


  (Decadron)  4 mg  STK-MED ONCE  4/27/20 10:56


 4/27/20 10:57 DC  





 


 Dexmedetomidine


 HCl 400 mcg/


 Sodium Chloride  100 ml @ 0


 mls/hr  CONT  PRN  4/2/20 08:15


    5/3/20 05:29


16.7 MLS/HR


 


 Dextrose


  (Dextrose


 50%-Water Syringe)  12.5 gm  PRN Q15MIN  PRN  3/16/20 09:30


     





 


 Digoxin


  (Lanoxin)  125 mcg  1X  ONCE  3/19/20 18:00


 3/19/20 18:01 DC 3/19/20 17:10


125 MCG


 


 Diphenhydramine


 HCl


  (Benadryl)  25 mg  1X PRN  PRN  4/24/20 15:45


 4/25/20 15:44 DC  





 


 Enoxaparin Sodium


  (Lovenox 100mg


 Syringe)  100 mg  Q12HR  4/21/20 21:00


   UNV  





 


 Etomidate


  (Amidate)  8 mg  1X  ONCE  3/23/20 08:30


 3/23/20 08:31 DC 3/23/20 08:33


8 MG


 


 Fentanyl Citrate


  (Fentanyl 2ml


 Vial)  100 mcg  STK-MED ONCE  4/27/20 10:56


 4/27/20 10:57 DC  





 


 Furosemide


  (Lasix)  40 mg  1X  ONCE  4/13/20 14:30


 4/13/20 14:31 DC 4/13/20 14:39


40 MG


 


 Heparin Sodium


  (Porcine)


  (Hep Lock Adult)  500 unit  STK-MED ONCE  4/7/20 09:29


 4/7/20 09:30 DC  





 


 Heparin Sodium


  (Porcine)


  (Heparin Sodium)  5,000 unit  Q12HR  4/27/20 21:00


    5/2/20 21:54


5,000 UNIT


 


 Heparin Sodium


  (Porcine) 1000


 unit/Sodium


 Chloride  1,001 ml @ 


 1,001 mls/hr  1X  ONCE  4/27/20 12:00


 4/27/20 12:59 DC  





 


 Hydromorphone HCl


  (Dilaudid


 Standard PCA)  12 mg  STK-MED ONCE  4/30/20 13:47


 5/1/20 11:03 DC  





 


 Hydromorphone HCl


  (Dilaudid)  1 mg  PRN Q3HRS  PRN  3/17/20 12:00


 3/31/20 00:25 DC 3/23/20 05:13


1 MG


 


 Info


  (CONTRAST GIVEN


 -- Rx MONITORING)  1 each  PRN DAILY  PRN  3/30/20 11:45


 4/1/20 11:44 DC  





 


 Info


  (Icu Electrolyte


 Protocol)  1 ea  CONT PRN  PRN  3/29/20 13:15


     





 


 Info


  (PHARMACY


 MONITORING -- do


 not chart)  1 each  PRN DAILY  PRN  4/24/20 15:45


     





 


 Info


  (Tpn Per


 Pharmacy)  1 each  PRN DAILY  PRN  3/18/20 12:30


   UNV  





 


 Insulin Human


 Lispro


  (HumaLOG)  0-9 UNITS  Q6HRS  3/16/20 09:30


    4/30/20 17:29


4 UNITS


 


 Insulin Human


 Regular


  (HumuLIN R VIAL)  5 unit  1X  ONCE  3/17/20 22:30


 3/17/20 22:31 DC 3/17/20 22:14


5 UNIT


 


 Iohexol


  (Omnipaque 240


 Mg/ml)  30 ml  1X  ONCE  3/30/20 11:30


 3/30/20 11:33 DC 3/30/20 11:30


30 ML


 


 Iohexol


  (Omnipaque 300


 Mg/ml)  50 ml  STK-MED ONCE  4/27/20 10:58


 4/27/20 10:59 DC  





 


 Iohexol


  (Omnipaque 350


 Mg/ml)  90 ml  1X  ONCE  3/16/20 03:30


 3/16/20 03:31 DC 3/16/20 03:25


90 ML


 


 Ketorolac


 Tromethamine


  (Toradol 30mg


 Vial)  30 mg  1X  ONCE  3/16/20 03:00


 3/16/20 03:01 DC 3/16/20 02:54


30 MG


 


 Lidocaine HCl


  (Buffered


 Lidocaine 1%)  6 ml  1X  ONCE  4/15/20 13:30


 4/15/20 13:31 DC 4/15/20 13:45


9 ML


 


 Lidocaine HCl


  (Glydo


  (Lidocaine) Jelly)  1 thomas  1X  ONCE  3/20/20 14:30


 3/20/20 14:31 DC 3/20/20 16:38


1 THOMAS


 


 Lidocaine HCl


  (Xylocaine-Mpf


 1% 2ml Vial)  2 ml  PRN 1X  PRN  4/27/20 07:00


 4/28/20 06:59 DC  





 


 Linezolid/Dextrose  300 ml @ 


 300 mls/hr  Q12HR  4/10/20 11:00


 4/21/20 08:10 DC 4/20/20 20:40


300 MLS/HR


 


 Lorazepam


  (Ativan Inj)  1 mg  PRN Q4HRS  PRN  3/19/20 09:00


 4/17/20 09:19 DC 4/17/20 03:51


1 MG


 


 Magnesium Sulfate  50 ml @ 25


 mls/hr  1X  ONCE  5/2/20 10:30


 5/2/20 12:29 DC 5/2/20 10:34


25 MLS/HR


 


 Meropenem 1 gm/


 Sodium Chloride  100 ml @ 


 200 mls/hr  Q8HRS  4/28/20 14:00


    5/3/20 05:27


200 MLS/HR


 


 Meropenem 500 mg/


 Sodium Chloride  50 ml @ 


 100 mls/hr  Q12H  4/8/20 10:00


 4/28/20 12:37 DC 4/28/20 10:45


100 MLS/HR


 


 Metoprolol


 Tartrate


  (Lopressor Vial)  5 mg  Q6HRS  3/17/20 10:15


 3/28/20 08:48 DC 3/26/20 00:12


5 MG


 


 Metronidazole  100 ml @ 


 100 mls/hr  Q8HRS  4/14/20 10:00


 4/21/20 08:10 DC 4/21/20 06:04


100 MLS/HR


 


 Micafungin Sodium


 100 mg/Dextrose  100 ml @ 


 100 mls/hr  Q24H  3/23/20 09:00


 4/30/20 20:58 DC 4/30/20 08:18


100 MLS/HR


 


 Midazolam HCl


  (Versed)  5 mg  1X  ONCE  3/23/20 08:30


 3/23/20 08:31 DC  





 


 Midazolam HCl 100


 mg/Sodium Chloride  100 ml @ 7


 mls/hr  CONT  PRN  3/28/20 16:00


    4/8/20 15:35


7 MLS/HR


 


 Midazolam HCl 50


 mg/Sodium Chloride  50 ml @ 0


 mls/hr  CONT  PRN  3/23/20 08:15


 3/28/20 15:59 DC 3/26/20 22:39


7 MLS/HR


 


 Morphine Sulfate


  (Morphine


 Sulfate)  2 mg  PRN Q2HR  PRN  3/16/20 05:00


 3/17/20 14:15 DC 3/17/20 12:26


2 MG


 


 Multi-Ingred


 Cream/Lotion/Oil/


 Oint


  (Artificial


 Tears Eye


 Ointment)  1 thomas  PRN Q1HR  PRN  3/25/20 17:30


    4/13/20 08:19


1 THOMAS


 


 Naloxone HCl


  (Narcan)  0.4 mg  PRN Q2MIN  PRN  4/27/20 14:30


   UNV  





 


 Norepinephrine


 Bitartrate 8 mg/


 Dextrose  258 ml @ 


 17.299 mls/


 hr  CONT  PRN  3/17/20 15:30


 4/17/20 09:19 DC 4/14/20 12:48


20.9 MLS/HR


 


 Ondansetron HCl


  (Zofran)  4 mg  STK-MED ONCE  4/27/20 10:56


 4/27/20 10:57 DC  





 


 Pantoprazole


 Sodium


  (PROTONIX VIAL


 for IV PUSH)  40 mg  DAILYAC  3/16/20 11:30


    5/2/20 08:20


40 MG


 


 Phenylephrine HCl


  (PHENYLEPHRINE


 in 0.9% NACL PF)  1 mg  STK-MED ONCE  4/27/20 12:34


 4/27/20 12:34 DC  





 


 Piperacillin Sod/


 Tazobactam Sod


 4.5 gm/Sodium


 Chloride  100 ml @ 


 200 mls/hr  1X  ONCE  3/16/20 06:00


 3/16/20 06:29 DC 3/16/20 05:44


200 MLS/HR


 


 Potassium


 Chloride 15 meq/


 Bicarbonate


 Dialysis Soln w/


 out KCl  5,007.5 ml


  @ 1,000 mls/


 hr  Q5H1M  3/29/20 20:00


 4/2/20 13:08 DC 4/1/20 18:14


1,000 MLS/HR


 


 Potassium


 Chloride 20 meq/


 Bicarbonate


 Dialysis Soln w/


 out KCl  5,010 ml @ 


 1,000 mls/hr  Q5H1M  3/25/20 16:00


 3/29/20 19:59 DC 3/29/20 14:54


1,000 MLS/HR


 


 Potassium


 Chloride 75 meq/


 Magnesium Sulfate


 20 meq/Calcium


 Gluconate 10 meq/


 Multivitamins 10


 ml/Chromium/


 Copper/Manganese/


 Seleni/Zn 0.5 ml/


 Insulin Human


 Regular 30 unit/


 Total Parenteral


 Nutrition/Amino


 Acids/Dextrose/


 Fat Emulsion


 Intravenous  1,920 ml @ 


 80 mls/hr  TPN  CONT  5/2/20 22:00


 5/3/20 21:59  5/2/20 21:51


80 MLS/HR


 


 Potassium


 Chloride/Water  100 ml @ 


 100 mls/hr  Q1H  5/2/20 07:00


 5/2/20 10:59 DC 5/2/20 13:05


100 MLS/HR


 


 Potassium


 Phosphate 20 mmol/


 Sodium Chloride  106.6667


 ml @ 


 51.667 m...  1X  ONCE  3/25/20 13:00


 3/25/20 15:03 DC 3/25/20 12:51


51.667 MLS/HR


 


 Potassium Acetate


 30 meq/Magnesium


 Sulfate 20 meq/


 Calcium Gluconate


 10 meq/


 Multivitamins 10


 ml/Chromium/


 Copper/Manganese/


 Seleni/Zn 0.5 ml/


 Insulin Human


 Regular 30 unit/


 Potassium


 Chloride 30 meq/


 Total Parenteral


 Nutrition/Amino


 Acids/Dextrose/


 Fat Emulsion


 Intravenous  1,920 ml @ 


 80 mls/hr  TPN  CONT  5/1/20 22:00


 5/2/20 21:59 DC 5/1/20 22:34


80 MLS/HR


 


 Potassium Acetate


 55 meq/Magnesium


 Sulfate 20 meq/


 Calcium Gluconate


 10 meq/


 Multivitamins 10


 ml/Chromium/


 Copper/Manganese/


 Seleni/Zn 0.5 ml/


 Insulin Human


 Regular 30 unit/


 Total Parenteral


 Nutrition/Amino


 Acids/Dextrose/


 Fat Emulsion


 Intravenous  1,920 ml @ 


 80 mls/hr  TPN  CONT  4/30/20 22:00


 5/1/20 21:59 DC 5/1/20 01:00


80 MLS/HR


 


 Potassium Acetate


 55 meq/Magnesium


 Sulfate 20 meq/


 Calcium Gluconate


 10 meq/


 Multivitamins 10


 ml/Chromium/


 Copper/Manganese/


 Seleni/Zn 0.5 ml/


 Insulin Human


 Regular 35 unit/


 Total Parenteral


 Nutrition/Amino


 Acids/Dextrose/


 Fat Emulsion


 Intravenous  1,920 ml @ 


 80 mls/hr  TPN  CONT  4/28/20 22:00


 4/29/20 21:59 DC 4/28/20 22:02


80 MLS/HR


 


 Potassium Acetate


 65 meq/Magnesium


 Sulfate 20 meq/


 Calcium Gluconate


 10 meq/


 Multivitamins 10


 ml/Chromium/


 Copper/Manganese/


 Seleni/Zn 0.5 ml/


 Insulin Human


 Regular 30 unit/


 Total Parenteral


 Nutrition/Amino


 Acids/Dextrose/


 Fat Emulsion


 Intravenous  1,920 ml @ 


 80 mls/hr  TPN  CONT  4/29/20 22:00


 4/30/20 21:59 DC 4/29/20 22:22


80 MLS/HR


 


 Prochlorperazine


 Edisylate


  (Compazine)  5 mg  PACU PRN  PRN  4/27/20 07:00


 4/28/20 06:59 DC  





 


 Propofol  20 ml @ As


 Directed  STK-MED ONCE  4/27/20 12:26


 4/27/20 12:27 DC  





 


 Ringer's Solution  1,000 ml @ 


 30 mls/hr  Q24H  4/27/20 07:00


 4/27/20 18:59 DC  





 


 Rocuronium Bromide


  (Zemuron)  50 mg  STK-MED ONCE  4/27/20 10:56


 4/27/20 10:57 DC  





 


 Sevoflurane


  (Ultane)  60 ml  STK-MED ONCE  4/27/20 12:26


 4/27/20 12:27 DC  





 


 Sodium


 Bicarbonate 50


 meq/Sodium


 Chloride  1,050 ml @ 


 75 mls/hr  Q14H  3/18/20 07:30


 3/23/20 10:28 DC 3/22/20 21:10


75 MLS/HR


 


 Sodium Acetate 50


 meq/Potassium


 Acetate 55 meq/


 Magnesium Sulfate


 20 meq/Calcium


 Gluconate 10 meq/


 Multivitamins 10


 ml/Chromium/


 Copper/Manganese/


 Seleni/Zn 0.5 ml/


 Insulin Human


 Regular 35 unit/


 Total Parenteral


 Nutrition/Amino


 Acids/Dextrose/


 Fat Emulsion


 Intravenous  1,800 ml @ 


 75 mls/hr  TPN  CONT  4/25/20 22:00


 4/26/20 21:59 DC 4/25/20 22:03


75 MLS/HR


 


 Sodium Chloride  1,000 ml @ 


 25 mls/hr  Q24H  4/27/20 14:30


   UNV  





 


 Sodium Chloride


  (Normal Saline


 Flush)  3 ml  QSHIFT  PRN  4/27/20 13:45


     





 


 Sodium Chloride


 90 meq/Calcium


 Gluconate 10 meq/


 Multivitamins 10


 ml/Chromium/


 Copper/Manganese/


 Seleni/Zn 0.5 ml/


 Total Parenteral


 Nutrition/Amino


 Acids/Dextrose/


 Fat Emulsion


 Intravenous  1,512 ml @ 


 63 mls/hr  TPN  CONT  3/18/20 22:00


 3/19/20 21:59 DC 3/18/20 22:06


63 MLS/HR


 


 Sodium Chloride


 90 meq/Calcium


 Gluconate 10 meq/


 Multivitamins 10


 ml/Chromium/


 Copper/Manganese/


 Seleni/Zn 1 ml/


 Total Parenteral


 Nutrition/Amino


 Acids/Dextrose/


 Fat Emulsion


 Intravenous  55.005 ml


  @ 2.292


 mls/hr  TPN  CONT  3/18/20 22:00


 3/18/20 12:33 DC  





 


 Sodium Chloride


 90 meq/Magnesium


 Sulfate 10 meq/


 Calcium Gluconate


 20 meq/


 Multivitamins 10


 ml/Chromium/


 Copper/Manganese/


 Seleni/Zn 0.5 ml/


 Total Parenteral


 Nutrition/Amino


 Acids/Dextrose/


 Fat Emulsion


 Intravenous  1,512 ml @ 


 63 mls/hr  TPN  CONT  3/19/20 22:00


 3/20/20 21:59 DC 3/19/20 22:25


63 MLS/HR


 


 Sodium Chloride


 90 meq/Magnesium


 Sulfate 12 meq/


 Calcium Gluconate


 15 meq/


 Multivitamins 10


 ml/Chromium/


 Copper/Manganese/


 Seleni/Zn 0.5 ml/


 Insulin Human


 Regular 25 unit/


 Total Parenteral


 Nutrition/Amino


 Acids/Dextrose/


 Fat Emulsion


 Intravenous  1,400 ml @ 


 58.333 mls/


 hr  TPN  CONT  4/8/20 22:00


 4/9/20 21:59 DC 4/8/20 21:41


58.333 MLS/HR


 


 Sodium Chloride


 90 meq/Potassium


 Chloride 15 meq/


 Magnesium Sulfate


 12 meq/Calcium


 Gluconate 15 meq/


 Multivitamins 10


 ml/Chromium/


 Copper/Manganese/


 Seleni/Zn 0.5 ml/


 Insulin Human


 Regular 25 unit/


 Total Parenteral


 Nutrition/Amino


 Acids/Dextrose/


 Fat Emulsion


 Intravenous  1,400 ml @ 


 58.333 mls/


 hr  TPN  CONT  4/7/20 22:00


 4/8/20 21:59 DC 4/7/20 22:13


58.333 MLS/HR


 


 Sodium Chloride


 90 meq/Potassium


 Chloride 15 meq/


 Potassium


 Phosphate 10 mmol/


 Magnesium Sulfate


 8 meq/Calcium


 Gluconate 15 meq/


 Multivitamins 10


 ml/Chromium/


 Copper/Manganese/


 Seleni/Zn 0.5 ml/


 Insulin Human


 Regular 25 unit/


 Total Parenteral


 Nutrition/Amino


 Acids/Dextrose/


 Fat Emulsion


 Intravenous  1,400 ml @ 


 58.333 mls/


 hr  TPN  CONT  4/5/20 22:00


 4/6/20 21:59 DC 4/5/20 21:20


58.333 MLS/HR


 


 Sodium Chloride


 90 meq/Potassium


 Chloride 15 meq/


 Potassium


 Phosphate 10 mmol/


 Magnesium Sulfate


 10 meq/Calcium


 Gluconate 20 meq/


 Multivitamins 10


 ml/Chromium/


 Copper/Manganese/


 Seleni/Zn 0.5 ml/


 Total Parenteral


 Nutrition/Amino


 Acids/Dextrose/


 Fat Emulsion


 Intravenous  1,400 ml @ 


 58.333 mls/


 hr  TPN  CONT  3/23/20 22:00


 3/24/20 21:59 DC 3/23/20 21:42


58.333 MLS/HR


 


 Sodium Chloride


 90 meq/Potassium


 Chloride 15 meq/


 Potassium


 Phosphate 10 mmol/


 Magnesium Sulfate


 12 meq/Calcium


 Gluconate 15 meq/


 Multivitamins 10


 ml/Chromium/


 Copper/Manganese/


 Seleni/Zn 0.5 ml/


 Insulin Human


 Regular 25 unit/


 Total Parenteral


 Nutrition/Amino


 Acids/Dextrose/


 Fat Emulsion


 Intravenous  1,400 ml @ 


 58.333 mls/


 hr  TPN  CONT  4/6/20 22:00


 4/7/20 21:59 DC 4/6/20 22:24


58.333 MLS/HR


 


 Sodium Chloride


 90 meq/Potassium


 Chloride 15 meq/


 Potassium


 Phosphate 15 mmol/


 Magnesium Sulfate


 10 meq/Calcium


 Gluconate 15 meq/


 Multivitamins 10


 ml/Chromium/


 Copper/Manganese/


 Seleni/Zn 0.5 ml/


 Total Parenteral


 Nutrition/Amino


 Acids/Dextrose/


 Fat Emulsion


 Intravenous  1,400 ml @ 


 58.333 mls/


 hr  TPN  CONT  3/24/20 22:00


 3/25/20 21:59 DC 3/24/20 22:17


58.333 MLS/HR


 


 Sodium Chloride


 90 meq/Potassium


 Chloride 15 meq/


 Potassium


 Phosphate 15 mmol/


 Magnesium Sulfate


 10 meq/Calcium


 Gluconate 20 meq/


 Multivitamins 10


 ml/Chromium/


 Copper/Manganese/


 Seleni/Zn 0.5 ml/


 Total Parenteral


 Nutrition/Amino


 Acids/Dextrose/


 Fat Emulsion


 Intravenous  1,200 ml @ 


 50 mls/hr  TPN  CONT  3/22/20 22:00


 3/22/20 14:17 DC  





 


 Sodium Chloride


 90 meq/Potassium


 Chloride 15 meq/


 Potassium


 Phosphate 18 mmol/


 Magnesium Sulfate


 8 meq/Calcium


 Gluconate 15 meq/


 Multivitamins 10


 ml/Chromium/


 Copper/Manganese/


 Seleni/Zn 0.5 ml/


 Insulin Human


 Regular 10 unit/


 Total Parenteral


 Nutrition/Amino


 Acids/Dextrose/


 Fat Emulsion


 Intravenous  1,400 ml @ 


 58.333 mls/


 hr  TPN  CONT  3/27/20 22:00


 3/28/20 21:59 DC 3/27/20 21:43


58.333 MLS/HR


 


 Sodium Chloride


 90 meq/Potassium


 Chloride 15 meq/


 Potassium


 Phosphate 18 mmol/


 Magnesium Sulfate


 8 meq/Calcium


 Gluconate 15 meq/


 Multivitamins 10


 ml/Chromium/


 Copper/Manganese/


 Seleni/Zn 0.5 ml/


 Insulin Human


 Regular 15 unit/


 Total Parenteral


 Nutrition/Amino


 Acids/Dextrose/


 Fat Emulsion


 Intravenous  1,400 ml @ 


 58.333 mls/


 hr  TPN  CONT  3/30/20 22:00


 3/31/20 21:59 DC 3/30/20 21:47


58.333 MLS/HR


 


 Sodium Chloride


 90 meq/Potassium


 Chloride 15 meq/


 Potassium


 Phosphate 18 mmol/


 Magnesium Sulfate


 8 meq/Calcium


 Gluconate 15 meq/


 Multivitamins 10


 ml/Chromium/


 Copper/Manganese/


 Seleni/Zn 0.5 ml/


 Insulin Human


 Regular 20 unit/


 Total Parenteral


 Nutrition/Amino


 Acids/Dextrose/


 Fat Emulsion


 Intravenous  1,400 ml @ 


 58.333 mls/


 hr  TPN  CONT  4/2/20 22:00


 4/3/20 21:59 DC 4/2/20 22:45


58.333 MLS/HR


 


 Sodium Chloride


 90 meq/Potassium


 Chloride 15 meq/


 Potassium


 Phosphate 18 mmol/


 Magnesium Sulfate


 8 meq/Calcium


 Gluconate 15 meq/


 Multivitamins 10


 ml/Chromium/


 Copper/Manganese/


 Seleni/Zn 0.5 ml/


 Total Parenteral


 Nutrition/Amino


 Acids/Dextrose/


 Fat Emulsion


 Intravenous  1,400 ml @ 


 58.333 mls/


 hr  TPN  CONT  3/26/20 22:00


 3/27/20 21:59 DC 3/26/20 22:00


58.333 MLS/HR


 


 Sodium Chloride


 90 meq/Potassium


 Phosphate 15 mmol/


 Magnesium Sulfate


 12 meq/Calcium


 Gluconate 15 meq/


 Multivitamins 10


 ml/Chromium/


 Copper/Manganese/


 Seleni/Zn 0.5 ml/


 Insulin Human


 Regular 30 unit/


 Total Parenteral


 Nutrition/Amino


 Acids/Dextrose/


 Fat Emulsion


 Intravenous  1,400 ml @ 


 58.333 mls/


 hr  TPN  CONT  4/10/20 22:00


 4/11/20 21:59 DC 4/10/20 21:49


58.333 MLS/HR


 


 Sodium Chloride


 90 meq/Potassium


 Phosphate 15 mmol/


 Magnesium Sulfate


 12 meq/Calcium


 Gluconate 15 meq/


 Multivitamins 10


 ml/Chromium/


 Copper/Manganese/


 Seleni/Zn 0.5 ml/


 Insulin Human


 Regular 40 unit/


 Total Parenteral


 Nutrition/Amino


 Acids/Dextrose/


 Fat Emulsion


 Intravenous  1,400 ml @ 


 58.333 mls/


 hr  TPN  CONT  4/11/20 22:00


 4/12/20 21:59 DC 4/11/20 21:21


58.333 MLS/HR


 


 Sodium Chloride


 90 meq/Potassium


 Phosphate 19 mmol/


 Magnesium Sulfate


 12 meq/Calcium


 Gluconate 15 meq/


 Multivitamins 10


 ml/Chromium/


 Copper/Manganese/


 Seleni/Zn 0.5 ml/


 Insulin Human


 Regular 40 unit/


 Total Parenteral


 Nutrition/Amino


 Acids/Dextrose/


 Fat Emulsion


 Intravenous  1,400 ml @ 


 58.333 mls/


 hr  TPN  CONT  4/12/20 22:00


 4/13/20 21:59 DC 4/12/20 21:54


58.333 MLS/HR


 


 Sodium Chloride


 90 meq/Potassium


 Phosphate 5 mmol/


 Magnesium Sulfate


 12 meq/Calcium


 Gluconate 15 meq/


 Multivitamins 10


 ml/Chromium/


 Copper/Manganese/


 Seleni/Zn 0.5 ml/


 Insulin Human


 Regular 30 unit/


 Total Parenteral


 Nutrition/Amino


 Acids/Dextrose/


 Fat Emulsion


 Intravenous  1,400 ml @ 


 58.333 mls/


 hr  TPN  CONT  4/9/20 22:00


 4/10/20 21:59 DC 4/9/20 22:08


58.333 MLS/HR


 


 Sodium Chloride


 100 meq/Potassium


 Chloride 40 meq/


 Magnesium Sulfate


 15 meq/Calcium


 Gluconate 15 meq/


 Multivitamins 10


 ml/Chromium/


 Copper/Manganese/


 Seleni/Zn 0.5 ml/


 Insulin Human


 Regular 35 unit/


 Total Parenteral


 Nutrition/Amino


 Acids/Dextrose/


 Fat Emulsion


 Intravenous  1,400 ml @ 


 58.333 mls/


 hr  TPN  CONT  4/19/20 22:00


 4/20/20 21:59 DC 4/19/20 22:46


58.333 MLS/HR


 


 Sodium Chloride


 100 meq/Potassium


 Chloride 40 meq/


 Magnesium Sulfate


 20 meq/Calcium


 Gluconate 10 meq/


 Multivitamins 10


 ml/Chromium/


 Copper/Manganese/


 Seleni/Zn 0.5 ml/


 Insulin Human


 Regular 35 unit/


 Total Parenteral


 Nutrition/Amino


 Acids/Dextrose/


 Fat Emulsion


 Intravenous  1,400 ml @ 


 58.333 mls/


 hr  TPN  CONT  4/23/20 22:00


 4/24/20 21:59 DC 4/24/20 00:06


58.333 MLS/HR


 


 Sodium Chloride


 100 meq/Potassium


 Chloride 40 meq/


 Magnesium Sulfate


 20 meq/Calcium


 Gluconate 15 meq/


 Multivitamins 10


 ml/Chromium/


 Copper/Manganese/


 Seleni/Zn 0.5 ml/


 Insulin Human


 Regular 35 unit/


 Total Parenteral


 Nutrition/Amino


 Acids/Dextrose/


 Fat Emulsion


 Intravenous  1,400 ml @ 


 58.333 mls/


 hr  TPN  CONT  4/22/20 22:00


 4/23/20 21:59 DC 4/22/20 22:27


58.333 MLS/HR


 


 Sodium Chloride


 100 meq/Potassium


 Phosphate 10 mmol/


 Magnesium Sulfate


 12 meq/Calcium


 Gluconate 15 meq/


 Multivitamins 10


 ml/Chromium/


 Copper/Manganese/


 Seleni/Zn 0.5 ml/


 Insulin Human


 Regular 35 unit/


 Potassium


 Chloride 20 meq/


 Total Parenteral


 Nutrition/Amino


 Acids/Dextrose/


 Fat Emulsion


 Intravenous  1,400 ml @ 


 58.333 mls/


 hr  TPN  CONT  4/16/20 22:00


 4/17/20 21:59 DC 4/16/20 22:10


58.333 MLS/HR


 


 Sodium Chloride


 100 meq/Potassium


 Phosphate 19 mmol/


 Magnesium Sulfate


 12 meq/Calcium


 Gluconate 15 meq/


 Multivitamins 10


 ml/Chromium/


 Copper/Manganese/


 Seleni/Zn 0.5 ml/


 Insulin Human


 Regular 40 unit/


 Potassium


 Chloride 20 meq/


 Total Parenteral


 Nutrition/Amino


 Acids/Dextrose/


 Fat Emulsion


 Intravenous  1,400 ml @ 


 58.333 mls/


 hr  TPN  CONT  4/15/20 22:00


 4/16/20 21:59 DC 4/15/20 21:20


58.333 MLS/HR


 


 Sodium Chloride


 100 meq/Potassium


 Phosphate 5 mmol/


 Magnesium Sulfate


 12 meq/Calcium


 Gluconate 15 meq/


 Multivitamins 10


 ml/Chromium/


 Copper/Manganese/


 Seleni/Zn 0.5 ml/


 Insulin Human


 Regular 35 unit/


 Potassium


 Chloride 20 meq/


 Total Parenteral


 Nutrition/Amino


 Acids/Dextrose/


 Fat Emulsion


 Intravenous  1,400 ml @ 


 58.333 mls/


 hr  TPN  CONT  4/17/20 22:00


 4/18/20 21:59 DC 4/17/20 22:59


58.333 MLS/HR


 


 Succinylcholine


 Chloride


  (Anectine)  120 mg  1X  ONCE  3/23/20 08:30


 3/23/20 08:31 DC 3/23/20 08:34


120 MG











Labs:


Lab





Laboratory Tests








Test


 5/2/20


13:34 5/2/20


18:06 5/3/20


00:03 5/3/20


05:25


 


Glucose (Fingerstick)


 146 mg/dL


(70-99) 135 mg/dL


(70-99) 122 mg/dL


(70-99) 113 mg/dL


(70-99)


 


Test


 5/3/20


05:30 


 


 





 


White Blood Count


 9.7 x10^3/uL


(4.0-11.0) 


 


 





 


Red Blood Count


 2.44 x10^6/uL


(3.50-5.40) 


 


 





 


Hemoglobin


 7.1 g/dL


(12.0-15.5) 


 


 





 


Hematocrit


 22.8 %


(36.0-47.0) 


 


 





 


Mean Corpuscular Volume 93 fL ()    


 


Mean Corpuscular Hemoglobin 29 pg (25-35)    


 


Mean Corpuscular Hemoglobin


Concent 31 g/dL


(31-37) 


 


 





 


Red Cell Distribution Width


 18.1 %


(11.5-14.5) 


 


 





 


Platelet Count


 408 x10^3/uL


(140-400) 


 


 





 


Neutrophils (%) (Auto) 79 % (31-73)    


 


Lymphocytes (%) (Auto) 14 % (24-48)    


 


Monocytes (%) (Auto) 5 % (0-9)    


 


Eosinophils (%) (Auto) 2 % (0-3)    


 


Basophils (%) (Auto) 0 % (0-3)    


 


Neutrophils # (Auto)


 7.7 x10^3/uL


(1.8-7.7) 


 


 





 


Lymphocytes # (Auto)


 1.4 x10^3/uL


(1.0-4.8) 


 


 





 


Monocytes # (Auto)


 0.5 x10^3/uL


(0.0-1.1) 


 


 





 


Eosinophils # (Auto)


 0.2 x10^3/uL


(0.0-0.7) 


 


 





 


Basophils # (Auto)


 0.0 x10^3/uL


(0.0-0.2) 


 


 





 


Sodium Level


 153 mmol/L


(136-145) 


 


 





 


Potassium Level


 4.3 mmol/L


(3.5-5.1) 


 


 





 


Chloride Level


 114 mmol/L


() 


 


 





 


Carbon Dioxide Level


 38 mmol/L


(21-32) 


 


 





 


Anion Gap 1 (6-14)    


 


Blood Urea Nitrogen


 47 mg/dL


(7-20) 


 


 





 


Creatinine


 1.0 mg/dL


(0.6-1.0) 


 


 





 


Estimated GFR


(Cockcroft-Gault) 58.9 


 


 


 





 


Glucose Level


 125 mg/dL


(70-99) 


 


 





 


Calcium Level


 8.7 mg/dL


(8.5-10.1) 


 


 














Objective:


Assessment:


Fever intermittent could be from underlying pancreatitis, pattern improving


Acute pancreatitis with persistent  necrosis


CT a/p 4/9


    Increased ascites. Persistent evidence of necrotizing pancreatitis with 

fluid and phlegmon


   at the pancreas


   4/27 status post ROBERT drain placement; 


Cholelithiasis with thickening of the gallbladder wall.


Leucocytosis improving


JED,Hyperkalemia, Metabolic acidosis off dialysis


Acute hypoxic resp failure ,bilateral pleural effusion and atelectasis


hypocalcemia 


Prediabetes


HTN


s/p trach





Plan:


Plan of Care





cont merrem (4/8), 


off micafungin and daptomycin


Follow-up Intra-Op cultures and lab 


Maintain aspiration precaution


PT and OT as tolerated


Continue supportive care











IVAN FRANZ MD            May 3, 2020 06:56

## 2020-05-03 NOTE — PDOC
G I PROGRESS NOTE


Subjective


Awake, alert.  Would love to be off ventilator.


Objective


Became hypercapnic on increased narcotic.


Physical Exam


Lungs clear anteriorly.


RRR


Abdomen somewhat distended.  Do hear occasional bowel sounds.


Review of Relevant


I have reviewed the following items josy (where applicable) has been applied.


Labs





Laboratory Tests








Test


 5/1/20


17:31 5/1/20


23:34 5/2/20


05:45 5/2/20


05:48


 


Glucose (Fingerstick)


 140 mg/dL


(70-99) 134 mg/dL


(70-99) 


 149 mg/dL


(70-99)


 


White Blood Count


 


 


 9.7 x10^3/uL


(4.0-11.0) 





 


Red Blood Count


 


 


 2.46 x10^6/uL


(3.50-5.40) 





 


Hemoglobin


 


 


 7.2 g/dL


(12.0-15.5) 





 


Hematocrit


 


 


 22.4 %


(36.0-47.0) 





 


Mean Corpuscular Volume   91 fL ()  


 


Mean Corpuscular Hemoglobin   29 pg (25-35)  


 


Mean Corpuscular Hemoglobin


Concent 


 


 32 g/dL


(31-37) 





 


Red Cell Distribution Width


 


 


 18.1 %


(11.5-14.5) 





 


Platelet Count


 


 


 419 x10^3/uL


(140-400) 





 


Neutrophils (%) (Auto)   77 % (31-73)  


 


Lymphocytes (%) (Auto)   16 % (24-48)  


 


Monocytes (%) (Auto)   5 % (0-9)  


 


Eosinophils (%) (Auto)   2 % (0-3)  


 


Basophils (%) (Auto)   0 % (0-3)  


 


Neutrophils # (Auto)


 


 


 7.4 x10^3/uL


(1.8-7.7) 





 


Lymphocytes # (Auto)


 


 


 1.5 x10^3/uL


(1.0-4.8) 





 


Monocytes # (Auto)


 


 


 0.4 x10^3/uL


(0.0-1.1) 





 


Eosinophils # (Auto)


 


 


 0.2 x10^3/uL


(0.0-0.7) 





 


Basophils # (Auto)


 


 


 0.0 x10^3/uL


(0.0-0.2) 





 


Sodium Level


 


 


 154 mmol/L


(136-145) 





 


Potassium Level


 


 


 2.9 mmol/L


(3.5-5.1) 





 


Chloride Level


 


 


 111 mmol/L


() 





 


Carbon Dioxide Level


 


 


 34 mmol/L


(21-32) 





 


Anion Gap   9 (6-14)  


 


Blood Urea Nitrogen


 


 


 45 mg/dL


(7-20) 





 


Creatinine


 


 


 1.0 mg/dL


(0.6-1.0) 





 


Estimated GFR


(Cockcroft-Gault) 


 


 58.9 


 





 


Glucose Level


 


 


 153 mg/dL


(70-99) 





 


Calcium Level


 


 


 8.6 mg/dL


(8.5-10.1) 





 


Phosphorus Level


 


 


 3.6 mg/dL


(2.6-4.7) 





 


Magnesium Level


 


 


 1.8 mg/dL


(1.8-2.4) 





 


Test


 5/2/20


13:34 5/2/20


18:06 5/3/20


00:03 5/3/20


05:25


 


Glucose (Fingerstick)


 146 mg/dL


(70-99) 135 mg/dL


(70-99) 122 mg/dL


(70-99) 113 mg/dL


(70-99)


 


Test


 5/3/20


05:30 5/3/20


07:55 


 





 


White Blood Count


 9.7 x10^3/uL


(4.0-11.0) 


 


 





 


Red Blood Count


 2.44 x10^6/uL


(3.50-5.40) 


 


 





 


Hemoglobin


 7.1 g/dL


(12.0-15.5) 


 


 





 


Hematocrit


 22.8 %


(36.0-47.0) 


 


 





 


Mean Corpuscular Volume 93 fL ()    


 


Mean Corpuscular Hemoglobin 29 pg (25-35)    


 


Mean Corpuscular Hemoglobin


Concent 31 g/dL


(31-37) 


 


 





 


Red Cell Distribution Width


 18.1 %


(11.5-14.5) 


 


 





 


Platelet Count


 408 x10^3/uL


(140-400) 


 


 





 


Neutrophils (%) (Auto) 79 % (31-73)    


 


Lymphocytes (%) (Auto) 14 % (24-48)    


 


Monocytes (%) (Auto) 5 % (0-9)    


 


Eosinophils (%) (Auto) 2 % (0-3)    


 


Basophils (%) (Auto) 0 % (0-3)    


 


Neutrophils # (Auto)


 7.7 x10^3/uL


(1.8-7.7) 


 


 





 


Lymphocytes # (Auto)


 1.4 x10^3/uL


(1.0-4.8) 


 


 





 


Monocytes # (Auto)


 0.5 x10^3/uL


(0.0-1.1) 


 


 





 


Eosinophils # (Auto)


 0.2 x10^3/uL


(0.0-0.7) 


 


 





 


Basophils # (Auto)


 0.0 x10^3/uL


(0.0-0.2) 


 


 





 


Sodium Level


 153 mmol/L


(136-145) 


 


 





 


Potassium Level


 4.3 mmol/L


(3.5-5.1) 


 


 





 


Chloride Level


 114 mmol/L


() 


 


 





 


Carbon Dioxide Level


 38 mmol/L


(21-32) 


 


 





 


Anion Gap 1 (6-14)    


 


Blood Urea Nitrogen


 47 mg/dL


(7-20) 


 


 





 


Creatinine


 1.0 mg/dL


(0.6-1.0) 


 


 





 


Estimated GFR


(Cockcroft-Gault) 58.9 


 


 


 





 


Glucose Level


 125 mg/dL


(70-99) 


 


 





 


Calcium Level


 8.7 mg/dL


(8.5-10.1) 


 


 





 


O2 Saturation  97 % (92-99)   


 


Arterial Blood pH


 


 7.27


(7.35-7.45) 


 





 


Arterial Blood pCO2 at


Patient Temp 


 75 mmHg


(35-46) 


 





 


Arterial Blood pO2 at Patient


Temp 


 123 mmHg


() 


 





 


Arterial Blood HCO3


 


 34 mmol/L


(21-28) 


 





 


Arterial Blood Base Excess


 


 6 mmol/L


(-3-3) 


 





 


Oxyhemoglobin  96.8 %   


 


Methemoglobin


 


 0.5 %


(0.0-1.9) 


 





 


Carbon Monoxide, Quantitative


 


 0.1 %


(0.0-1.9) 


 





 


FiO2  40   








Laboratory Tests








Test


 5/2/20


13:34 5/2/20


18:06 5/3/20


00:03 5/3/20


05:25


 


Glucose (Fingerstick)


 146 mg/dL


(70-99) 135 mg/dL


(70-99) 122 mg/dL


(70-99) 113 mg/dL


(70-99)


 


Test


 5/3/20


05:30 5/3/20


07:55 


 





 


White Blood Count


 9.7 x10^3/uL


(4.0-11.0) 


 


 





 


Red Blood Count


 2.44 x10^6/uL


(3.50-5.40) 


 


 





 


Hemoglobin


 7.1 g/dL


(12.0-15.5) 


 


 





 


Hematocrit


 22.8 %


(36.0-47.0) 


 


 





 


Mean Corpuscular Volume 93 fL ()    


 


Mean Corpuscular Hemoglobin 29 pg (25-35)    


 


Mean Corpuscular Hemoglobin


Concent 31 g/dL


(31-37) 


 


 





 


Red Cell Distribution Width


 18.1 %


(11.5-14.5) 


 


 





 


Platelet Count


 408 x10^3/uL


(140-400) 


 


 





 


Neutrophils (%) (Auto) 79 % (31-73)    


 


Lymphocytes (%) (Auto) 14 % (24-48)    


 


Monocytes (%) (Auto) 5 % (0-9)    


 


Eosinophils (%) (Auto) 2 % (0-3)    


 


Basophils (%) (Auto) 0 % (0-3)    


 


Neutrophils # (Auto)


 7.7 x10^3/uL


(1.8-7.7) 


 


 





 


Lymphocytes # (Auto)


 1.4 x10^3/uL


(1.0-4.8) 


 


 





 


Monocytes # (Auto)


 0.5 x10^3/uL


(0.0-1.1) 


 


 





 


Eosinophils # (Auto)


 0.2 x10^3/uL


(0.0-0.7) 


 


 





 


Basophils # (Auto)


 0.0 x10^3/uL


(0.0-0.2) 


 


 





 


Sodium Level


 153 mmol/L


(136-145) 


 


 





 


Potassium Level


 4.3 mmol/L


(3.5-5.1) 


 


 





 


Chloride Level


 114 mmol/L


() 


 


 





 


Carbon Dioxide Level


 38 mmol/L


(21-32) 


 


 





 


Anion Gap 1 (6-14)    


 


Blood Urea Nitrogen


 47 mg/dL


(7-20) 


 


 





 


Creatinine


 1.0 mg/dL


(0.6-1.0) 


 


 





 


Estimated GFR


(Cockcroft-Gault) 58.9 


 


 


 





 


Glucose Level


 125 mg/dL


(70-99) 


 


 





 


Calcium Level


 8.7 mg/dL


(8.5-10.1) 


 


 





 


O2 Saturation  97 % (92-99)   


 


Arterial Blood pH


 


 7.27


(7.35-7.45) 


 





 


Arterial Blood pCO2 at


Patient Temp 


 75 mmHg


(35-46) 


 





 


Arterial Blood pO2 at Patient


Temp 


 123 mmHg


() 


 





 


Arterial Blood HCO3


 


 34 mmol/L


(21-28) 


 





 


Arterial Blood Base Excess


 


 6 mmol/L


(-3-3) 


 





 


Oxyhemoglobin  96.8 %   


 


Methemoglobin


 


 0.5 %


(0.0-1.9) 


 





 


Carbon Monoxide, Quantitative


 


 0.1 %


(0.0-1.9) 


 





 


FiO2  40   








Microbiology


4/30/20 Aerobic Culture, Resulted


          Pending


4/30/20 Aerobic Culture Result 1 ([facility]), Resulted


          Pending


4/30/20 Gram Stain - Final, Resulted


          


4/30/20 Gram Stain Result 1 ([facility]) - Final, Resulted


          


4/30/20 Gram Stain Result 2 ([facility]) - Final, Resulted


          


4/29/20 Blood Culture - Preliminary, Resulted


          NO GROWTH AFTER 3 DAYS


4/27/20 Aerobic and Anaerobic Culture - Preliminary, Resulted


          


4/27/20 Anaerobic Culture Result 1 ([facility]) - Preliminary, Resulted


          


4/27/20 Aerobic Culture - Final, Resulted


          


4/27/20 Aerobic Culture Result 1 ([facility]) - Final, Resulted


          


4/27/20 Gram Stain - Final, Resulted


          


4/27/20 Gram Stain Result 1 ([facility]) - Final, Resulted


          


4/27/20 Gram Stain Result 2 ([facility]) - Final, Resulted


          


4/12/20 Urine Culture - Final, Complete


          


4/12/20 Urine Culture Result 1 ([facility]) - Final, Complete


Vitals/I & O





Vital Sign - Last 24 Hours








 5/2/20 5/2/20 5/2/20 5/2/20





 13:00 14:00 15:00 16:00


 


Pulse 126 112 102 


 


Resp 36 28 34 


 


B/P (MAP) 91/59 (70) 118/59 (78) 104/62 (76) 


 


Pulse Ox 100 100 100 


 


O2 Delivery Tracheal Collar Tracheal Collar Tracheal Collar Trach Collar





 5/2/20 5/2/20 5/2/20 5/2/20





 16:00 16:00 16:38 17:00


 


Temp  98.6  





  98.6  


 


Pulse  142  114


 


Resp  32  32


 


B/P (MAP)  134/80 (98)  132/77 (95)


 


Pulse Ox  100 100 100


 


O2 Delivery Trach Collar Tracheal Collar  Tracheal Collar


 


O2 Flow Rate   10.0 


 


    





    





 5/2/20 5/2/20 5/2/20 5/2/20





 18:00 19:00 20:00 20:00


 


Temp    97.7





    97.7


 


Pulse 124 113  107


 


Resp 33 28  25


 


B/P (MAP) 132/77 (95) 118/70 (86)  112/61 (78)


 


Pulse Ox 100 99  97


 


O2 Delivery Tracheal Collar Tracheal Collar Mechanical Ventilator Tracheal 

Collar


 


    





    





 5/2/20 5/2/20 5/2/20 5/3/20





 21:00 22:00 23:00 00:00


 


Temp    97.4





    97.4


 


Pulse 120 92 86 92


 


Resp 26 25 25 26


 


B/P (MAP) 141/56 (84) 95/54 (68) 95/47 (63) 99/59 (72)


 


Pulse Ox 98 100 100 100


 


O2 Delivery Tracheal Collar Tracheal Collar Tracheal Collar Tracheal Collar


 


    





    





 5/3/20 5/3/20 5/3/20 5/3/20





 00:00 01:00 02:00 03:00


 


Pulse  74 132 100


 


Resp  25 25 25


 


B/P (MAP)  89/39 (56) 166/78 (107) 95/49 (64)


 


Pulse Ox  100 90 97


 


O2 Delivery Mechanical Ventilator Tracheal Collar Tracheal Collar Tracheal 

Collar





 5/3/20 5/3/20 5/3/20 5/3/20





 04:00 04:00 05:00 06:00


 


Temp  97.7  





  97.7  


 


Pulse  78 78 103


 


Resp  25 25 25


 


B/P (MAP)  84/44 (57) 99/48 (65) 115/65 (82)


 


Pulse Ox  99 99 100


 


O2 Delivery Mechanical Ventilator Tracheal Collar Tracheal Collar Tracheal 

Collar


 


    





    





 5/3/20 5/3/20  





 08:08 12:17  


 


Pulse Ox 95 96  


 


O2 Delivery Ventilator Ventilator  














Intake and Output0   


 


 5/2/20 5/2/20 5/3/20





 15:00 23:00 07:00


 


Intake Total 350 ml 1774 ml 1628.5 ml


 


Output Total 775 ml 1045 ml 765 ml


 


Balance -425 ml 729 ml 863.5 ml








Problem List


Problems


Medical Problems:


(1) Acute pancreatitis


Status: Acute  





(2) Cholelithiasis


Status: Acute  





Assessment


Biliary pancreatitis with MOSF.


Plan of Care Note


Continue weaning attempts, other.





Hemodynamically unstable?:  No


Is patient in severe pain?:  No


Is NPO status required?:  Yes











MARCO ANTONIO LONGO MD           May 3, 2020 12:27

## 2020-05-03 NOTE — PDOC
SUBJECTIVE


ROS


stable





OBJECTIVE


Vital Signs





Vital Signs








  Date Time  Temp Pulse Resp B/P (MAP) Pulse Ox O2 Delivery O2 Flow Rate FiO2


 


5/3/20 08:08     95 Ventilator  


 


5/3/20 06:00  103 25 115/65 (82)    


 


5/3/20 04:00 97.7       





 97.7       


 


5/2/20 16:38       10.0 








I & 0











Intake and Output 


 


 5/3/20





 07:00


 


Intake Total 3752.5 ml


 


Output Total 2585 ml


 


Balance 1167.5 ml


 


 


 


IV Total 3752.5 ml


 


Output Urine Total 2565 ml


 


Drainage Total 20 ml











PHYSICAL EXAM


Physical Exam


GENERAL: Propped up in bed, 


HEENT:  oral cavity dry 


NECK:  Trach/vent 


LUNGS: Non labored  


HEART:  S1, S2, regular 


ABDOMEN: Distended, hypoactive BS, drain placement (4/27 )


: [place] (4/14)


EXTREMITIES: Generalized edema,


DERMATOLOGIC:  Warm and dry.  No generalized rash.





DIAGNOSIS/ASSESSMENT


Assessment & Plan





ARF-- ATN,requiring CRRT and HD 


Has been Off HD, HDC removed  


Stable renal function, Excellent UOP, dced  IV Diuretics  on 5/2 


e-lytes stable , currently on TPN, strict I/O, avoid nephrotoxins  


 


Anemia-  Currently on YOLI 





Hypernatremia -- DCed Bumex ,  improving  Na 





HyPokalemia- replace as needed 





Anasarca due to 3rd spacing , Prolonged Hospitalization   stable  





Hyperkalemia- resolved  





Acute pancreatitis with persistent  necrosis


 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 

the pancreas


   4/27 status post ROBERT drain placement; 





 Cholelithiasis with possible cholecystitis.





Moderate pleural effusions, may be slightly improved. Infiltrate/atelectasis in 

both lung bases.


  


 Ascites - post paracentesis on 4/15-  4300 cc of thin, light brown fluid was 

removed





Acute hypoxic resp failure ,bilateral pleural effusion


  Trach in place , On Vent  


 


HTN 





COVID-19 neg, 3/26





COMMENT/RELEVANT DATA


Meds





Current Medications








 Medications


  (Trade)  Dose


 Ordered  Sig/Yee  Start Time


 Stop Time Status Last Admin


Dose Admin


 


 Acetaminophen


  (Tylenol Supp)  650 mg  PRN Q6HRS  PRN  3/24/20 10:30


    4/27/20 00:32


650 MG


 


 Acetaminophen


  (Tylenol)  650 mg  PRN Q6HRS  PRN  3/21/20 03:36


    4/16/20 19:56


650 MG


 


 Albumin Human  200 ml @ 


 200 mls/hr  1X PRN  PRN  4/21/20 08:00


 4/21/20 13:59 DC 4/21/20 08:40


200 MLS/HR


 


 Albuterol Sulfate


  (Ventolin Neb


 Soln)  2.5 mg  1X  ONCE  3/17/20 22:30


 3/17/20 22:31 DC 3/18/20 00:56


2.5 MG


 


 Alteplase,


 Recombinant


  (Cathflo For


 Central Catheter


 Clearance)  1 mg  1X  ONCE  4/24/20 10:45


 4/24/20 10:46 DC 4/24/20 11:44


1 MG


 


 Amino Acids/


 Glycerin/


 Electrolytes  1,000 ml @ 


 75 mls/hr  [id number]  4/20/20 21:15


   UNV  





 


 Artificial Tears


  (Artificial


 Tears)  1 drop  PRN Q15MIN  PRN  4/29/20 05:30


    5/1/20 12:34


1 DROP


 


 Atenolol


  (Tenormin)  100 mg  DAILY  3/17/20 09:00


 3/16/20 20:08 DC  





 


 Atropine Sulfate


  (ATROPINE 0.5mg


 SYRINGE)  0.5 mg  PRN Q5MIN  PRN  4/2/20 08:15


     





 


 Benzocaine


  (Hurricaine One)  1 spray  1X  ONCE  3/20/20 14:30


 3/20/20 14:31 DC 3/20/20 16:38


1 SPRAY


 


 Bisacodyl


  (Dulcolax Supp)  10 mg  STK-MED ONCE  4/27/20 10:59


 4/27/20 10:59 DC  





 


 Bumetanide


  (Bumex)  2 mg  [id number]  4/28/20 14:00


 5/2/20 14:10 DC 5/1/20 13:50


2 MG


 


 Bupivacaine HCl/


 Epinephrine Bitart


  (Sensorcain-Epi


 0.5%-1:312350 Mpf)  30 ml  STK-MED ONCE  4/27/20 10:58


 4/27/20 10:58 DC 4/27/20 12:01


7 ML


 


 Calcium Carbonate/


 Glycine


  (Tums)  500 mg  PRN [id number]  PRN  3/18/20 17:45


     





 


 Calcium Chloride


 1000 mg/Sodium


 Chloride  110 ml @ 


 220 mls/hr  1X  ONCE  3/17/20 22:30


 3/17/20 22:59 DC 3/17/20 22:11


220 MLS/HR


 


 Calcium Chloride


 3000 mg/Sodium


 Chloride  1,030 ml @ 


 50 mls/hr  [id number]  3/19/20 08:00


 3/21/20 15:23 DC 3/21/20 02:17


50 MLS/HR


 


 Calcium Gluconate


  (Calcium


 Gluconate)  2,000 mg  1X  ONCE  3/19/20 02:15


 3/19/20 02:16 DC 3/19/20 02:19


2,000 MG


 


 Calcium Gluconate


 1000 mg/Sodium


 Chloride  110 ml @ 


 220 mls/hr  1X  ONCE  3/18/20 03:30


 3/18/20 03:59 DC 3/18/20 03:21


220 MLS/HR


 


 Calcium Gluconate


 2000 mg/Sodium


 Chloride  120 ml @ 


 220 mls/hr  1X  ONCE  3/18/20 07:30


 3/18/20 08:02 DC 3/18/20 09:05


220 MLS/HR


 


 Cefepime HCl


  (Maxipime)  2 gm  Q12HR  3/25/20 09:00


 4/8/20 09:58 DC 4/7/20 20:56


2 GM


 


 Cellulose


  (Surgicel


 Fibrillar 1x2)  1 each  STK-MED ONCE  4/6/20 11:00


 4/6/20 11:01 DC  





 


 Cellulose


  (Surgicel


 Hemostat 2x14)  1 each  STK-MED ONCE  4/27/20 10:58


 4/27/20 10:59 DC  





 


 Cellulose


  (Surgicel


 Hemostat 4x8)  1 each  STK-MED ONCE  4/27/20 10:58


 4/27/20 10:59 DC  





 


 Cyclobenzaprine


 HCl


  (Flexeril)  10 mg  PRN Q6HRS  PRN  4/30/20 10:45


     





 


 Daptomycin 430 mg/


 Sodium Chloride  50 ml @ 


 100 mls/hr  Q24H  4/25/20 13:00


 4/30/20 20:58 DC 4/30/20 13:00


100 MLS/HR


 


 Daptomycin 500 mg/


 Sodium Chloride  50 ml @ 


 100 mls/hr  Q48H  3/25/20 08:30


 4/10/20 10:07 DC 4/10/20 09:57


100 MLS/HR


 


 Dexamethasone


 Sodium Phosphate


  (Decadron)  4 mg  STK-MED ONCE  4/27/20 10:56


 4/27/20 10:57 DC  





 


 Dexmedetomidine


 HCl 400 mcg/


 Sodium Chloride  100 ml @ 0


 mls/hr  CONT  PRN  4/2/20 08:15


    5/3/20 05:29


16.7 MLS/HR


 


 Dextrose


  (Dextrose


 50%-Water Syringe)  12.5 gm  PRN Q15MIN  PRN  3/16/20 09:30


     





 


 Digoxin


  (Lanoxin)  125 mcg  1X  ONCE  3/19/20 18:00


 3/19/20 18:01 DC 3/19/20 17:10


125 MCG


 


 Diphenhydramine


 HCl


  (Benadryl)  25 mg  1X PRN  PRN  4/24/20 15:45


 4/25/20 15:44 DC  





 


 Enoxaparin Sodium


  (Lovenox 100mg


 Syringe)  100 mg  Q12HR  4/21/20 21:00


   UNV  





 


 Etomidate


  (Amidate)  8 mg  1X  ONCE  3/23/20 08:30


 3/23/20 08:31 DC 3/23/20 08:33


8 MG


 


 Fentanyl Citrate


  (Fentanyl 2ml


 Vial)  100 mcg  STK-MED ONCE  4/27/20 10:56


 4/27/20 10:57 DC  





 


 Furosemide


  (Lasix)  40 mg  1X  ONCE  4/13/20 14:30


 4/13/20 14:31 DC 4/13/20 14:39


40 MG


 


 Heparin Sodium


  (Porcine)


  (Hep Lock Adult)  500 unit  STK-MED ONCE  4/7/20 09:29


 4/7/20 09:30 DC  





 


 Heparin Sodium


  (Porcine)


  (Heparin Sodium)  5,000 unit  Q12HR  4/27/20 21:00


    5/2/20 21:54


5,000 UNIT


 


 Heparin Sodium


  (Porcine) 1000


 unit/Sodium


 Chloride  1,001 ml @ 


 1,001 mls/hr  1X  ONCE  4/27/20 12:00


 4/27/20 12:59 DC  





 


 Hydromorphone HCl


  (Dilaudid


 Standard PCA)  12 mg  STK-MED ONCE  4/30/20 13:47


 5/1/20 11:03 DC  





 


 Hydromorphone HCl


  (Dilaudid)  1 mg  PRN Q3HRS  PRN  3/17/20 12:00


 3/31/20 00:25 DC 3/23/20 05:13


1 MG


 


 Info


  (CONTRAST GIVEN


 -- Rx MONITORING)  1 each  PRN DAILY  PRN  3/30/20 11:45


 4/1/20 11:44 DC  





 


 Info


  (Icu Electrolyte


 Protocol)  1 ea  CONT PRN  PRN  3/29/20 13:15


     





 


 Info


  (PHARMACY


 MONITORING -- do


 not chart)  1 each  PRN DAILY  PRN  4/24/20 15:45


     





 


 Info


  (Tpn Per


 Pharmacy)  1 each  PRN DAILY  PRN  3/18/20 12:30


   UNV  





 


 Insulin Human


 Lispro


  (HumaLOG)  0-9 UNITS  Q6HRS  3/16/20 09:30


    4/30/20 17:29


4 UNITS


 


 Insulin Human


 Regular


  (HumuLIN R VIAL)  5 unit  1X  ONCE  3/17/20 22:30


 3/17/20 22:31 DC 3/17/20 22:14


5 UNIT


 


 Iohexol


  (Omnipaque 240


 Mg/ml)  30 ml  1X  ONCE  3/30/20 11:30


 3/30/20 11:33 DC 3/30/20 11:30


30 ML


 


 Iohexol


  (Omnipaque 300


 Mg/ml)  50 ml  STK-MED ONCE  4/27/20 10:58


 4/27/20 10:59 DC  





 


 Iohexol


  (Omnipaque 350


 Mg/ml)  90 ml  1X  ONCE  3/16/20 03:30


 3/16/20 03:31 DC 3/16/20 03:25


90 ML


 


 Ketorolac


 Tromethamine


  (Toradol 30mg


 Vial)  30 mg  1X  ONCE  3/16/20 03:00


 3/16/20 03:01 DC 3/16/20 02:54


30 MG


 


 Lidocaine HCl


  (Buffered


 Lidocaine 1%)  6 ml  1X  ONCE  4/15/20 13:30


 4/15/20 13:31 DC 4/15/20 13:45


9 ML


 


 Lidocaine HCl


  (Glydo


  (Lidocaine) Jelly)  1 thomas  1X  ONCE  3/20/20 14:30


 3/20/20 14:31 DC 3/20/20 16:38


1 THOMAS


 


 Lidocaine HCl


  (Xylocaine-Mpf


 1% 2ml Vial)  2 ml  PRN 1X  PRN  4/27/20 07:00


 4/28/20 06:59 DC  





 


 Linezolid/Dextrose  300 ml @ 


 300 mls/hr  Q12HR  4/10/20 11:00


 4/21/20 08:10 DC 4/20/20 20:40


300 MLS/HR


 


 Lorazepam


  (Ativan Inj)  1 mg  PRN Q4HRS  PRN  3/19/20 09:00


 4/17/20 09:19 DC 4/17/20 03:51


1 MG


 


 Magnesium Sulfate  50 ml @ 25


 mls/hr  1X  ONCE  5/2/20 10:30


 5/2/20 12:29 DC 5/2/20 10:34


25 MLS/HR


 


 Meropenem 1 gm/


 Sodium Chloride  100 ml @ 


 200 mls/hr  Q8HRS  4/28/20 14:00


    5/3/20 05:27


200 MLS/HR


 


 Meropenem 500 mg/


 Sodium Chloride  50 ml @ 


 100 mls/hr  Q12H  4/8/20 10:00


 4/28/20 12:37 DC 4/28/20 10:45


100 MLS/HR


 


 Metoprolol


 Tartrate


  (Lopressor Vial)  5 mg  Q6HRS  3/17/20 10:15


 3/28/20 08:48 DC 3/26/20 00:12


5 MG


 


 Metronidazole  100 ml @ 


 100 mls/hr  Q8HRS  4/14/20 10:00


 4/21/20 08:10 DC 4/21/20 06:04


100 MLS/HR


 


 Micafungin Sodium


 100 mg/Dextrose  100 ml @ 


 100 mls/hr  Q24H  3/23/20 09:00


 4/30/20 20:58 DC 4/30/20 08:18


100 MLS/HR


 


 Midazolam HCl


  (Versed)  5 mg  1X  ONCE  3/23/20 08:30


 3/23/20 08:31 DC  





 


 Midazolam HCl 100


 mg/Sodium Chloride  100 ml @ 7


 mls/hr  CONT  PRN  3/28/20 16:00


    4/8/20 15:35


7 MLS/HR


 


 Midazolam HCl 50


 mg/Sodium Chloride  50 ml @ 0


 mls/hr  CONT  PRN  3/23/20 08:15


 3/28/20 15:59 DC 3/26/20 22:39


7 MLS/HR


 


 Morphine Sulfate


  (Morphine


 Sulfate)  2 mg  PRN Q2HR  PRN  3/16/20 05:00


 3/17/20 14:15 DC 3/17/20 12:26


2 MG


 


 Multi-Ingred


 Cream/Lotion/Oil/


 Oint


  (Artificial


 Tears Eye


 Ointment)  1 thomas  PRN Q1HR  PRN  3/25/20 17:30


    4/13/20 08:19


1 THOMAS


 


 Naloxone HCl


  (Narcan)  0.4 mg  PRN Q2MIN  PRN  4/27/20 14:30


   UNV  





 


 Norepinephrine


 Bitartrate 8 mg/


 Dextrose  258 ml @ 


 17.299 mls/


 hr  CONT  PRN  3/17/20 15:30


 4/17/20 09:19 DC 4/14/20 12:48


20.9 MLS/HR


 


 Ondansetron HCl


  (Zofran)  4 mg  STK-MED ONCE  4/27/20 10:56


 4/27/20 10:57 DC  





 


 Pantoprazole


 Sodium


  (PROTONIX VIAL


 for IV PUSH)  40 mg  DAILYAC  3/16/20 11:30


    5/3/20 09:19


40 MG


 


 Phenylephrine HCl


  (PHENYLEPHRINE


 in 0.9% NACL PF)  1 mg  STK-MED ONCE  4/27/20 12:34


 4/27/20 12:34 DC  





 


 Piperacillin Sod/


 Tazobactam Sod


 4.5 gm/Sodium


 Chloride  100 ml @ 


 200 mls/hr  1X  ONCE  3/16/20 06:00


 3/16/20 06:29 DC 3/16/20 05:44


200 MLS/HR


 


 Potassium


 Chloride 15 meq/


 Bicarbonate


 Dialysis Soln w/


 out KCl  5,007.5 ml


  @ 1,000 mls/


 hr  Q5H1M  3/29/20 20:00


 4/2/20 13:08 DC 4/1/20 18:14


1,000 MLS/HR


 


 Potassium


 Chloride 20 meq/


 Bicarbonate


 Dialysis Soln w/


 out KCl  5,010 ml @ 


 1,000 mls/hr  Q5H1M  3/25/20 16:00


 3/29/20 19:59 DC 3/29/20 14:54


1,000 MLS/HR


 


 Potassium


 Chloride 75 meq/


 Magnesium Sulfate


 20 meq/Calcium


 Gluconate 10 meq/


 Multivitamins 10


 ml/Chromium/


 Copper/Manganese/


 Seleni/Zn 0.5 ml/


 Insulin Human


 Regular 30 unit/


 Total Parenteral


 Nutrition/Amino


 Acids/Dextrose/


 Fat Emulsion


 Intravenous  1,920 ml @ 


 80 mls/hr  TPN  CONT  5/2/20 22:00


 5/3/20 21:59  5/2/20 21:51


80 MLS/HR


 


 Potassium


 Chloride/Water  100 ml @ 


 100 mls/hr  Q1H  5/2/20 07:00


 5/2/20 10:59 DC 5/2/20 13:05


100 MLS/HR


 


 Potassium


 Phosphate 20 mmol/


 Sodium Chloride  106.6667


 ml @ 


 51.667 m...  1X  ONCE  3/25/20 13:00


 3/25/20 15:03 DC 3/25/20 12:51


51.667 MLS/HR


 


 Potassium Acetate


 30 meq/Magnesium


 Sulfate 20 meq/


 Calcium Gluconate


 10 meq/


 Multivitamins 10


 ml/Chromium/


 Copper/Manganese/


 Seleni/Zn 0.5 ml/


 Insulin Human


 Regular 30 unit/


 Potassium


 Chloride 30 meq/


 Total Parenteral


 Nutrition/Amino


 Acids/Dextrose/


 Fat Emulsion


 Intravenous  1,920 ml @ 


 80 mls/hr  TPN  CONT  5/1/20 22:00


 5/2/20 21:59 DC 5/1/20 22:34


80 MLS/HR


 


 Potassium Acetate


 55 meq/Magnesium


 Sulfate 20 meq/


 Calcium Gluconate


 10 meq/


 Multivitamins 10


 ml/Chromium/


 Copper/Manganese/


 Seleni/Zn 0.5 ml/


 Insulin Human


 Regular 30 unit/


 Total Parenteral


 Nutrition/Amino


 Acids/Dextrose/


 Fat Emulsion


 Intravenous  1,920 ml @ 


 80 mls/hr  TPN  CONT  4/30/20 22:00


 5/1/20 21:59 DC 5/1/20 01:00


80 MLS/HR


 


 Potassium Acetate


 55 meq/Magnesium


 Sulfate 20 meq/


 Calcium Gluconate


 10 meq/


 Multivitamins 10


 ml/Chromium/


 Copper/Manganese/


 Seleni/Zn 0.5 ml/


 Insulin Human


 Regular 35 unit/


 Total Parenteral


 Nutrition/Amino


 Acids/Dextrose/


 Fat Emulsion


 Intravenous  1,920 ml @ 


 80 mls/hr  TPN  CONT  4/28/20 22:00


 4/29/20 21:59 DC 4/28/20 22:02


80 MLS/HR


 


 Potassium Acetate


 65 meq/Magnesium


 Sulfate 20 meq/


 Calcium Gluconate


 10 meq/


 Multivitamins 10


 ml/Chromium/


 Copper/Manganese/


 Seleni/Zn 0.5 ml/


 Insulin Human


 Regular 30 unit/


 Total Parenteral


 Nutrition/Amino


 Acids/Dextrose/


 Fat Emulsion


 Intravenous  1,920 ml @ 


 80 mls/hr  TPN  CONT  4/29/20 22:00


 4/30/20 21:59 DC 4/29/20 22:22


80 MLS/HR


 


 Prochlorperazine


 Edisylate


  (Compazine)  5 mg  PACU PRN  PRN  4/27/20 07:00


 4/28/20 06:59 DC  





 


 Propofol  20 ml @ As


 Directed  STK-MED ONCE  4/27/20 12:26


 4/27/20 12:27 DC  





 


 Ringer's Solution  1,000 ml @ 


 30 mls/hr  Q24H  4/27/20 07:00


 4/27/20 18:59 DC  





 


 Rocuronium Bromide


  (Zemuron)  50 mg  STK-MED ONCE  4/27/20 10:56


 4/27/20 10:57 DC  





 


 Sevoflurane


  (Ultane)  60 ml  STK-MED ONCE  4/27/20 12:26


 4/27/20 12:27 DC  





 


 Sodium


 Bicarbonate 50


 meq/Sodium


 Chloride  1,050 ml @ 


 75 mls/hr  Q14H  3/18/20 07:30


 3/23/20 10:28 DC 3/22/20 21:10


75 MLS/HR


 


 Sodium Acetate 50


 meq/Potassium


 Acetate 55 meq/


 Magnesium Sulfate


 20 meq/Calcium


 Gluconate 10 meq/


 Multivitamins 10


 ml/Chromium/


 Copper/Manganese/


 Seleni/Zn 0.5 ml/


 Insulin Human


 Regular 35 unit/


 Total Parenteral


 Nutrition/Amino


 Acids/Dextrose/


 Fat Emulsion


 Intravenous  1,800 ml @ 


 75 mls/hr  TPN  CONT  4/25/20 22:00


 4/26/20 21:59 DC 4/25/20 22:03


75 MLS/HR


 


 Sodium Chloride  1,000 ml @ 


 25 mls/hr  Q24H  4/27/20 14:30


   UNV  





 


 Sodium Chloride


  (Normal Saline


 Flush)  3 ml  QSHIFT  PRN  4/27/20 13:45


     





 


 Sodium Chloride


 90 meq/Calcium


 Gluconate 10 meq/


 Multivitamins 10


 ml/Chromium/


 Copper/Manganese/


 Seleni/Zn 0.5 ml/


 Total Parenteral


 Nutrition/Amino


 Acids/Dextrose/


 Fat Emulsion


 Intravenous  1,512 ml @ 


 63 mls/hr  TPN  CONT  3/18/20 22:00


 3/19/20 21:59 DC 3/18/20 22:06


63 MLS/HR


 


 Sodium Chloride


 90 meq/Calcium


 Gluconate 10 meq/


 Multivitamins 10


 ml/Chromium/


 Copper/Manganese/


 Seleni/Zn 1 ml/


 Total Parenteral


 Nutrition/Amino


 Acids/Dextrose/


 Fat Emulsion


 Intravenous  55.005 ml


  @ 2.292


 mls/hr  TPN  CONT  3/18/20 22:00


 3/18/20 12:33 DC  





 


 Sodium Chloride


 90 meq/Magnesium


 Sulfate 10 meq/


 Calcium Gluconate


 20 meq/


 Multivitamins 10


 ml/Chromium/


 Copper/Manganese/


 Seleni/Zn 0.5 ml/


 Total Parenteral


 Nutrition/Amino


 Acids/Dextrose/


 Fat Emulsion


 Intravenous  1,512 ml @ 


 63 mls/hr  TPN  CONT  3/19/20 22:00


 3/20/20 21:59 DC 3/19/20 22:25


63 MLS/HR


 


 Sodium Chloride


 90 meq/Magnesium


 Sulfate 12 meq/


 Calcium Gluconate


 15 meq/


 Multivitamins 10


 ml/Chromium/


 Copper/Manganese/


 Seleni/Zn 0.5 ml/


 Insulin Human


 Regular 25 unit/


 Total Parenteral


 Nutrition/Amino


 Acids/Dextrose/


 Fat Emulsion


 Intravenous  1,400 ml @ 


 58.333 mls/


 hr  TPN  CONT  4/8/20 22:00


 4/9/20 21:59 DC 4/8/20 21:41


58.333 MLS/HR


 


 Sodium Chloride


 90 meq/Potassium


 Chloride 15 meq/


 Magnesium Sulfate


 12 meq/Calcium


 Gluconate 15 meq/


 Multivitamins 10


 ml/Chromium/


 Copper/Manganese/


 Seleni/Zn 0.5 ml/


 Insulin Human


 Regular 25 unit/


 Total Parenteral


 Nutrition/Amino


 Acids/Dextrose/


 Fat Emulsion


 Intravenous  1,400 ml @ 


 58.333 mls/


 hr  TPN  CONT  4/7/20 22:00


 4/8/20 21:59 DC 4/7/20 22:13


58.333 MLS/HR


 


 Sodium Chloride


 90 meq/Potassium


 Chloride 15 meq/


 Potassium


 Phosphate 10 mmol/


 Magnesium Sulfate


 8 meq/Calcium


 Gluconate 15 meq/


 Multivitamins 10


 ml/Chromium/


 Copper/Manganese/


 Seleni/Zn 0.5 ml/


 Insulin Human


 Regular 25 unit/


 Total Parenteral


 Nutrition/Amino


 Acids/Dextrose/


 Fat Emulsion


 Intravenous  1,400 ml @ 


 58.333 mls/


 hr  TPN  CONT  4/5/20 22:00


 4/6/20 21:59 DC 4/5/20 21:20


58.333 MLS/HR


 


 Sodium Chloride


 90 meq/Potassium


 Chloride 15 meq/


 Potassium


 Phosphate 10 mmol/


 Magnesium Sulfate


 10 meq/Calcium


 Gluconate 20 meq/


 Multivitamins 10


 ml/Chromium/


 Copper/Manganese/


 Seleni/Zn 0.5 ml/


 Total Parenteral


 Nutrition/Amino


 Acids/Dextrose/


 Fat Emulsion


 Intravenous  1,400 ml @ 


 58.333 mls/


 hr  TPN  CONT  3/23/20 22:00


 3/24/20 21:59 DC 3/23/20 21:42


58.333 MLS/HR


 


 Sodium Chloride


 90 meq/Potassium


 Chloride 15 meq/


 Potassium


 Phosphate 10 mmol/


 Magnesium Sulfate


 12 meq/Calcium


 Gluconate 15 meq/


 Multivitamins 10


 ml/Chromium/


 Copper/Manganese/


 Seleni/Zn 0.5 ml/


 Insulin Human


 Regular 25 unit/


 Total Parenteral


 Nutrition/Amino


 Acids/Dextrose/


 Fat Emulsion


 Intravenous  1,400 ml @ 


 58.333 mls/


 hr  TPN  CONT  4/6/20 22:00


 4/7/20 21:59 DC 4/6/20 22:24


58.333 MLS/HR


 


 Sodium Chloride


 90 meq/Potassium


 Chloride 15 meq/


 Potassium


 Phosphate 15 mmol/


 Magnesium Sulfate


 10 meq/Calcium


 Gluconate 15 meq/


 Multivitamins 10


 ml/Chromium/


 Copper/Manganese/


 Seleni/Zn 0.5 ml/


 Total Parenteral


 Nutrition/Amino


 Acids/Dextrose/


 Fat Emulsion


 Intravenous  1,400 ml @ 


 58.333 mls/


 hr  TPN  CONT  3/24/20 22:00


 3/25/20 21:59 DC 3/24/20 22:17


58.333 MLS/HR


 


 Sodium Chloride


 90 meq/Potassium


 Chloride 15 meq/


 Potassium


 Phosphate 15 mmol/


 Magnesium Sulfate


 10 meq/Calcium


 Gluconate 20 meq/


 Multivitamins 10


 ml/Chromium/


 Copper/Manganese/


 Seleni/Zn 0.5 ml/


 Total Parenteral


 Nutrition/Amino


 Acids/Dextrose/


 Fat Emulsion


 Intravenous  1,200 ml @ 


 50 mls/hr  TPN  CONT  3/22/20 22:00


 3/22/20 14:17 DC  





 


 Sodium Chloride


 90 meq/Potassium


 Chloride 15 meq/


 Potassium


 Phosphate 18 mmol/


 Magnesium Sulfate


 8 meq/Calcium


 Gluconate 15 meq/


 Multivitamins 10


 ml/Chromium/


 Copper/Manganese/


 Seleni/Zn 0.5 ml/


 Insulin Human


 Regular 10 unit/


 Total Parenteral


 Nutrition/Amino


 Acids/Dextrose/


 Fat Emulsion


 Intravenous  1,400 ml @ 


 58.333 mls/


 hr  TPN  CONT  3/27/20 22:00


 3/28/20 21:59 DC 3/27/20 21:43


58.333 MLS/HR


 


 Sodium Chloride


 90 meq/Potassium


 Chloride 15 meq/


 Potassium


 Phosphate 18 mmol/


 Magnesium Sulfate


 8 meq/Calcium


 Gluconate 15 meq/


 Multivitamins 10


 ml/Chromium/


 Copper/Manganese/


 Seleni/Zn 0.5 ml/


 Insulin Human


 Regular 15 unit/


 Total Parenteral


 Nutrition/Amino


 Acids/Dextrose/


 Fat Emulsion


 Intravenous  1,400 ml @ 


 58.333 mls/


 hr  TPN  CONT  3/30/20 22:00


 3/31/20 21:59 DC 3/30/20 21:47


58.333 MLS/HR


 


 Sodium Chloride


 90 meq/Potassium


 Chloride 15 meq/


 Potassium


 Phosphate 18 mmol/


 Magnesium Sulfate


 8 meq/Calcium


 Gluconate 15 meq/


 Multivitamins 10


 ml/Chromium/


 Copper/Manganese/


 Seleni/Zn 0.5 ml/


 Insulin Human


 Regular 20 unit/


 Total Parenteral


 Nutrition/Amino


 Acids/Dextrose/


 Fat Emulsion


 Intravenous  1,400 ml @ 


 58.333 mls/


 hr  TPN  CONT  4/2/20 22:00


 4/3/20 21:59 DC 4/2/20 22:45


58.333 MLS/HR


 


 Sodium Chloride


 90 meq/Potassium


 Chloride 15 meq/


 Potassium


 Phosphate 18 mmol/


 Magnesium Sulfate


 8 meq/Calcium


 Gluconate 15 meq/


 Multivitamins 10


 ml/Chromium/


 Copper/Manganese/


 Seleni/Zn 0.5 ml/


 Total Parenteral


 Nutrition/Amino


 Acids/Dextrose/


 Fat Emulsion


 Intravenous  1,400 ml @ 


 58.333 mls/


 hr  TPN  CONT  3/26/20 22:00


 3/27/20 21:59 DC 3/26/20 22:00


58.333 MLS/HR


 


 Sodium Chloride


 90 meq/Potassium


 Phosphate 15 mmol/


 Magnesium Sulfate


 12 meq/Calcium


 Gluconate 15 meq/


 Multivitamins 10


 ml/Chromium/


 Copper/Manganese/


 Seleni/Zn 0.5 ml/


 Insulin Human


 Regular 30 unit/


 Total Parenteral


 Nutrition/Amino


 Acids/Dextrose/


 Fat Emulsion


 Intravenous  1,400 ml @ 


 58.333 mls/


 hr  TPN  CONT  4/10/20 22:00


 4/11/20 21:59 DC 4/10/20 21:49


58.333 MLS/HR


 


 Sodium Chloride


 90 meq/Potassium


 Phosphate 15 mmol/


 Magnesium Sulfate


 12 meq/Calcium


 Gluconate 15 meq/


 Multivitamins 10


 ml/Chromium/


 Copper/Manganese/


 Seleni/Zn 0.5 ml/


 Insulin Human


 Regular 40 unit/


 Total Parenteral


 Nutrition/Amino


 Acids/Dextrose/


 Fat Emulsion


 Intravenous  1,400 ml @ 


 58.333 mls/


 hr  TPN  CONT  4/11/20 22:00


 4/12/20 21:59 DC 4/11/20 21:21


58.333 MLS/HR


 


 Sodium Chloride


 90 meq/Potassium


 Phosphate 19 mmol/


 Magnesium Sulfate


 12 meq/Calcium


 Gluconate 15 meq/


 Multivitamins 10


 ml/Chromium/


 Copper/Manganese/


 Seleni/Zn 0.5 ml/


 Insulin Human


 Regular 40 unit/


 Total Parenteral


 Nutrition/Amino


 Acids/Dextrose/


 Fat Emulsion


 Intravenous  1,400 ml @ 


 58.333 mls/


 hr  TPN  CONT  4/12/20 22:00


 4/13/20 21:59 DC 4/12/20 21:54


58.333 MLS/HR


 


 Sodium Chloride


 90 meq/Potassium


 Phosphate 5 mmol/


 Magnesium Sulfate


 12 meq/Calcium


 Gluconate 15 meq/


 Multivitamins 10


 ml/Chromium/


 Copper/Manganese/


 Seleni/Zn 0.5 ml/


 Insulin Human


 Regular 30 unit/


 Total Parenteral


 Nutrition/Amino


 Acids/Dextrose/


 Fat Emulsion


 Intravenous  1,400 ml @ 


 58.333 mls/


 hr  TPN  CONT  4/9/20 22:00


 4/10/20 21:59 DC 4/9/20 22:08


58.333 MLS/HR


 


 Sodium Chloride


 100 meq/Potassium


 Chloride 40 meq/


 Magnesium Sulfate


 15 meq/Calcium


 Gluconate 15 meq/


 Multivitamins 10


 ml/Chromium/


 Copper/Manganese/


 Seleni/Zn 0.5 ml/


 Insulin Human


 Regular 35 unit/


 Total Parenteral


 Nutrition/Amino


 Acids/Dextrose/


 Fat Emulsion


 Intravenous  1,400 ml @ 


 58.333 mls/


 hr  TPN  CONT  4/19/20 22:00


 4/20/20 21:59 DC 4/19/20 22:46


58.333 MLS/HR


 


 Sodium Chloride


 100 meq/Potassium


 Chloride 40 meq/


 Magnesium Sulfate


 20 meq/Calcium


 Gluconate 10 meq/


 Multivitamins 10


 ml/Chromium/


 Copper/Manganese/


 Seleni/Zn 0.5 ml/


 Insulin Human


 Regular 35 unit/


 Total Parenteral


 Nutrition/Amino


 Acids/Dextrose/


 Fat Emulsion


 Intravenous  1,400 ml @ 


 58.333 mls/


 hr  TPN  CONT  4/23/20 22:00


 4/24/20 21:59 DC 4/24/20 00:06


58.333 MLS/HR


 


 Sodium Chloride


 100 meq/Potassium


 Chloride 40 meq/


 Magnesium Sulfate


 20 meq/Calcium


 Gluconate 15 meq/


 Multivitamins 10


 ml/Chromium/


 Copper/Manganese/


 Seleni/Zn 0.5 ml/


 Insulin Human


 Regular 35 unit/


 Total Parenteral


 Nutrition/Amino


 Acids/Dextrose/


 Fat Emulsion


 Intravenous  1,400 ml @ 


 58.333 mls/


 hr  TPN  CONT  4/22/20 22:00


 4/23/20 21:59 DC 4/22/20 22:27


58.333 MLS/HR


 


 Sodium Chloride


 100 meq/Potassium


 Phosphate 10 mmol/


 Magnesium Sulfate


 12 meq/Calcium


 Gluconate 15 meq/


 Multivitamins 10


 ml/Chromium/


 Copper/Manganese/


 Seleni/Zn 0.5 ml/


 Insulin Human


 Regular 35 unit/


 Potassium


 Chloride 20 meq/


 Total Parenteral


 Nutrition/Amino


 Acids/Dextrose/


 Fat Emulsion


 Intravenous  1,400 ml @ 


 58.333 mls/


 hr  TPN  CONT  4/16/20 22:00


 4/17/20 21:59 DC 4/16/20 22:10


58.333 MLS/HR


 


 Sodium Chloride


 100 meq/Potassium


 Phosphate 19 mmol/


 Magnesium Sulfate


 12 meq/Calcium


 Gluconate 15 meq/


 Multivitamins 10


 ml/Chromium/


 Copper/Manganese/


 Seleni/Zn 0.5 ml/


 Insulin Human


 Regular 40 unit/


 Potassium


 Chloride 20 meq/


 Total Parenteral


 Nutrition/Amino


 Acids/Dextrose/


 Fat Emulsion


 Intravenous  1,400 ml @ 


 58.333 mls/


 hr  TPN  CONT  4/15/20 22:00


 4/16/20 21:59 DC 4/15/20 21:20


58.333 MLS/HR


 


 Sodium Chloride


 100 meq/Potassium


 Phosphate 5 mmol/


 Magnesium Sulfate


 12 meq/Calcium


 Gluconate 15 meq/


 Multivitamins 10


 ml/Chromium/


 Copper/Manganese/


 Seleni/Zn 0.5 ml/


 Insulin Human


 Regular 35 unit/


 Potassium


 Chloride 20 meq/


 Total Parenteral


 Nutrition/Amino


 Acids/Dextrose/


 Fat Emulsion


 Intravenous  1,400 ml @ 


 58.333 mls/


 hr  TPN  CONT  4/17/20 22:00


 4/18/20 21:59 DC 4/17/20 22:59


58.333 MLS/HR


 


 Succinylcholine


 Chloride


  (Anectine)  120 mg  1X  ONCE  3/23/20 08:30


 3/23/20 08:31 DC 3/23/20 08:34


120 MG








Lab





Laboratory Tests








Test


 5/2/20


13:34 5/2/20


18:06 5/3/20


00:03 5/3/20


05:25


 


Glucose (Fingerstick)


 146 mg/dL


(70-99) 135 mg/dL


(70-99) 122 mg/dL


(70-99) 113 mg/dL


(70-99)


 


Test


 5/3/20


05:30 5/3/20


07:55 


 





 


White Blood Count


 9.7 x10^3/uL


(4.0-11.0) 


 


 





 


Red Blood Count


 2.44 x10^6/uL


(3.50-5.40) 


 


 





 


Hemoglobin


 7.1 g/dL


(12.0-15.5) 


 


 





 


Hematocrit


 22.8 %


(36.0-47.0) 


 


 





 


Mean Corpuscular Volume 93 fL ()    


 


Mean Corpuscular Hemoglobin 29 pg (25-35)    


 


Mean Corpuscular Hemoglobin


Concent 31 g/dL


(31-37) 


 


 





 


Red Cell Distribution Width


 18.1 %


(11.5-14.5) 


 


 





 


Platelet Count


 408 x10^3/uL


(140-400) 


 


 





 


Neutrophils (%) (Auto) 79 % (31-73)    


 


Lymphocytes (%) (Auto) 14 % (24-48)    


 


Monocytes (%) (Auto) 5 % (0-9)    


 


Eosinophils (%) (Auto) 2 % (0-3)    


 


Basophils (%) (Auto) 0 % (0-3)    


 


Neutrophils # (Auto)


 7.7 x10^3/uL


(1.8-7.7) 


 


 





 


Lymphocytes # (Auto)


 1.4 x10^3/uL


(1.0-4.8) 


 


 





 


Monocytes # (Auto)


 0.5 x10^3/uL


(0.0-1.1) 


 


 





 


Eosinophils # (Auto)


 0.2 x10^3/uL


(0.0-0.7) 


 


 





 


Basophils # (Auto)


 0.0 x10^3/uL


(0.0-0.2) 


 


 





 


Sodium Level


 153 mmol/L


(136-145) 


 


 





 


Potassium Level


 4.3 mmol/L


(3.5-5.1) 


 


 





 


Chloride Level


 114 mmol/L


() 


 


 





 


Carbon Dioxide Level


 38 mmol/L


(21-32) 


 


 





 


Anion Gap 1 (6-14)    


 


Blood Urea Nitrogen


 47 mg/dL


(7-20) 


 


 





 


Creatinine


 1.0 mg/dL


(0.6-1.0) 


 


 





 


Estimated GFR


(Cockcroft-Gault) 58.9 


 


 


 





 


Glucose Level


 125 mg/dL


(70-99) 


 


 





 


Calcium Level


 8.7 mg/dL


(8.5-10.1) 


 


 





 


O2 Saturation  97 % (92-99)   


 


Arterial Blood pH


 


 7.27


(7.35-7.45) 


 





 


Arterial Blood pCO2 at


Patient Temp 


 75 mmHg


(35-46) 


 





 


Arterial Blood pO2 at Patient


Temp 


 123 mmHg


() 


 





 


Arterial Blood HCO3


 


 34 mmol/L


(21-28) 


 





 


Arterial Blood Base Excess


 


 6 mmol/L


(-3-3) 


 





 


Oxyhemoglobin  96.8 %   


 


Methemoglobin


 


 0.5 %


(0.0-1.9) 


 





 


Carbon Monoxide, Quantitative


 


 0.1 %


(0.0-1.9) 


 





 


FiO2  40   








Results


All relevant outside records, renal labs, imaging studies, telemetry/EKG's were 

reviewed.











KIMBERLY MYERS MD                 May 3, 2020 12:06

## 2020-05-04 VITALS — DIASTOLIC BLOOD PRESSURE: 58 MMHG | SYSTOLIC BLOOD PRESSURE: 113 MMHG

## 2020-05-04 VITALS — DIASTOLIC BLOOD PRESSURE: 60 MMHG | SYSTOLIC BLOOD PRESSURE: 116 MMHG

## 2020-05-04 VITALS — SYSTOLIC BLOOD PRESSURE: 184 MMHG | DIASTOLIC BLOOD PRESSURE: 95 MMHG

## 2020-05-04 VITALS — SYSTOLIC BLOOD PRESSURE: 169 MMHG | DIASTOLIC BLOOD PRESSURE: 83 MMHG

## 2020-05-04 VITALS — DIASTOLIC BLOOD PRESSURE: 66 MMHG | SYSTOLIC BLOOD PRESSURE: 116 MMHG

## 2020-05-04 VITALS — DIASTOLIC BLOOD PRESSURE: 85 MMHG | SYSTOLIC BLOOD PRESSURE: 153 MMHG

## 2020-05-04 VITALS — DIASTOLIC BLOOD PRESSURE: 60 MMHG | SYSTOLIC BLOOD PRESSURE: 117 MMHG

## 2020-05-04 VITALS — SYSTOLIC BLOOD PRESSURE: 175 MMHG | DIASTOLIC BLOOD PRESSURE: 84 MMHG

## 2020-05-04 VITALS — DIASTOLIC BLOOD PRESSURE: 67 MMHG | SYSTOLIC BLOOD PRESSURE: 140 MMHG

## 2020-05-04 VITALS — SYSTOLIC BLOOD PRESSURE: 123 MMHG | DIASTOLIC BLOOD PRESSURE: 77 MMHG

## 2020-05-04 VITALS — DIASTOLIC BLOOD PRESSURE: 81 MMHG | SYSTOLIC BLOOD PRESSURE: 160 MMHG

## 2020-05-04 VITALS — DIASTOLIC BLOOD PRESSURE: 60 MMHG | SYSTOLIC BLOOD PRESSURE: 104 MMHG

## 2020-05-04 VITALS — DIASTOLIC BLOOD PRESSURE: 63 MMHG | SYSTOLIC BLOOD PRESSURE: 120 MMHG

## 2020-05-04 VITALS — DIASTOLIC BLOOD PRESSURE: 65 MMHG | SYSTOLIC BLOOD PRESSURE: 133 MMHG

## 2020-05-04 VITALS — DIASTOLIC BLOOD PRESSURE: 65 MMHG | SYSTOLIC BLOOD PRESSURE: 117 MMHG

## 2020-05-04 VITALS — SYSTOLIC BLOOD PRESSURE: 152 MMHG | DIASTOLIC BLOOD PRESSURE: 79 MMHG

## 2020-05-04 VITALS — SYSTOLIC BLOOD PRESSURE: 143 MMHG | DIASTOLIC BLOOD PRESSURE: 78 MMHG

## 2020-05-04 VITALS — SYSTOLIC BLOOD PRESSURE: 115 MMHG | DIASTOLIC BLOOD PRESSURE: 68 MMHG

## 2020-05-04 VITALS — DIASTOLIC BLOOD PRESSURE: 69 MMHG | SYSTOLIC BLOOD PRESSURE: 130 MMHG

## 2020-05-04 VITALS — DIASTOLIC BLOOD PRESSURE: 73 MMHG | SYSTOLIC BLOOD PRESSURE: 156 MMHG

## 2020-05-04 VITALS — DIASTOLIC BLOOD PRESSURE: 79 MMHG | SYSTOLIC BLOOD PRESSURE: 138 MMHG

## 2020-05-04 VITALS — DIASTOLIC BLOOD PRESSURE: 65 MMHG | SYSTOLIC BLOOD PRESSURE: 138 MMHG

## 2020-05-04 VITALS — SYSTOLIC BLOOD PRESSURE: 118 MMHG | DIASTOLIC BLOOD PRESSURE: 97 MMHG

## 2020-05-04 LAB
ALBUMIN SERPL-MCNC: 1.8 G/DL (ref 3.4–5)
ALBUMIN/GLOB SERPL: 0.5 {RATIO} (ref 1–1.7)
ALP SERPL-CCNC: 127 U/L (ref 46–116)
ALT SERPL-CCNC: 36 U/L (ref 14–59)
ANION GAP SERPL CALC-SCNC: 7 MMOL/L (ref 6–14)
AST SERPL-CCNC: 39 U/L (ref 15–37)
BASOPHILS # BLD AUTO: 0 X10^3/UL (ref 0–0.2)
BASOPHILS NFR BLD: 0 % (ref 0–3)
BILIRUB SERPL-MCNC: 0.5 MG/DL (ref 0.2–1)
BUN SERPL-MCNC: 45 MG/DL (ref 7–20)
BUN/CREAT SERPL: 50 (ref 6–20)
CALCIUM SERPL-MCNC: 8.8 MG/DL (ref 8.5–10.1)
CHLORIDE SERPL-SCNC: 114 MMOL/L (ref 98–107)
CO2 SERPL-SCNC: 33 MMOL/L (ref 21–32)
CREAT SERPL-MCNC: 0.9 MG/DL (ref 0.6–1)
EOSINOPHIL NFR BLD: 0.1 X10^3/UL (ref 0–0.7)
EOSINOPHIL NFR BLD: 2 % (ref 0–3)
ERYTHROCYTE [DISTWIDTH] IN BLOOD BY AUTOMATED COUNT: 18.9 % (ref 11.5–14.5)
GFR SERPLBLD BASED ON 1.73 SQ M-ARVRAT: 66.5 ML/MIN
GLOBULIN SER-MCNC: 3.8 G/DL (ref 2.2–3.8)
GLUCOSE SERPL-MCNC: 164 MG/DL (ref 70–99)
HCT VFR BLD CALC: 22.7 % (ref 36–47)
HGB BLD-MCNC: 7.1 G/DL (ref 12–15.5)
LYMPHOCYTES # BLD: 1.9 X10^3/UL (ref 1–4.8)
LYMPHOCYTES NFR BLD AUTO: 19 % (ref 24–48)
MAGNESIUM SERPL-MCNC: 2.2 MG/DL (ref 1.8–2.4)
MCH RBC QN AUTO: 29 PG (ref 25–35)
MCHC RBC AUTO-ENTMCNC: 31 G/DL (ref 31–37)
MCV RBC AUTO: 91 FL (ref 79–100)
MONO #: 0.5 X10^3/UL (ref 0–1.1)
MONOCYTES NFR BLD: 5 % (ref 0–9)
NEUT #: 7.3 X10^3/UL (ref 1.8–7.7)
NEUTROPHILS NFR BLD AUTO: 74 % (ref 31–73)
PHOSPHATE SERPL-MCNC: 3.4 MG/DL (ref 2.6–4.7)
PLATELET # BLD AUTO: 458 X10^3/UL (ref 140–400)
POTASSIUM SERPL-SCNC: 4 MMOL/L (ref 3.5–5.1)
PROT SERPL-MCNC: 5.6 G/DL (ref 6.4–8.2)
RBC # BLD AUTO: 2.49 X10^6/UL (ref 3.5–5.4)
SODIUM SERPL-SCNC: 154 MMOL/L (ref 136–145)
TRIGL SERPL-MCNC: 174 MG/DL (ref 0–150)
WBC # BLD AUTO: 9.9 X10^3/UL (ref 4–11)

## 2020-05-04 RX ADMIN — DEXMEDETOMIDINE HYDROCHLORIDE PRN MLS/HR: 100 INJECTION, SOLUTION, CONCENTRATE INTRAVENOUS at 05:52

## 2020-05-04 RX ADMIN — Medication PRN EACH: at 13:42

## 2020-05-04 RX ADMIN — INSULIN LISPRO SCH UNITS: 100 INJECTION, SOLUTION INTRAVENOUS; SUBCUTANEOUS at 00:00

## 2020-05-04 RX ADMIN — DEXMEDETOMIDINE HYDROCHLORIDE PRN MLS/HR: 100 INJECTION, SOLUTION, CONCENTRATE INTRAVENOUS at 18:21

## 2020-05-04 RX ADMIN — MEROPENEM SCH MLS/HR: 1 INJECTION, POWDER, FOR SOLUTION INTRAVENOUS at 14:38

## 2020-05-04 RX ADMIN — PROCHLORPERAZINE EDISYLATE PRN MG: 5 INJECTION INTRAMUSCULAR; INTRAVENOUS at 00:15

## 2020-05-04 RX ADMIN — MEROPENEM SCH MLS/HR: 1 INJECTION, POWDER, FOR SOLUTION INTRAVENOUS at 06:06

## 2020-05-04 RX ADMIN — BACITRACIN SCH MLS/HR: 5000 INJECTION, POWDER, FOR SOLUTION INTRAMUSCULAR at 13:37

## 2020-05-04 RX ADMIN — ONDANSETRON PRN MG: 2 INJECTION INTRAMUSCULAR; INTRAVENOUS at 11:59

## 2020-05-04 RX ADMIN — INSULIN LISPRO SCH UNITS: 100 INJECTION, SOLUTION INTRAVENOUS; SUBCUTANEOUS at 18:00

## 2020-05-04 RX ADMIN — DEXTROSE SCH MLS/HR: 50 INJECTION, SOLUTION INTRAVENOUS at 10:48

## 2020-05-04 RX ADMIN — INSULIN LISPRO SCH UNITS: 100 INJECTION, SOLUTION INTRAVENOUS; SUBCUTANEOUS at 06:00

## 2020-05-04 RX ADMIN — PANTOPRAZOLE SODIUM SCH MG: 40 INJECTION, POWDER, FOR SOLUTION INTRAVENOUS at 10:48

## 2020-05-04 RX ADMIN — DAPTOMYCIN SCH MLS/HR: 500 INJECTION, POWDER, LYOPHILIZED, FOR SOLUTION INTRAVENOUS at 11:59

## 2020-05-04 RX ADMIN — HYDROMORPHONE HYDROCHLORIDE PRN MG: 2 INJECTION INTRAMUSCULAR; INTRAVENOUS; SUBCUTANEOUS at 21:12

## 2020-05-04 RX ADMIN — DEXMEDETOMIDINE HYDROCHLORIDE PRN MLS/HR: 100 INJECTION, SOLUTION, CONCENTRATE INTRAVENOUS at 14:29

## 2020-05-04 RX ADMIN — Medication PRN EACH: at 13:52

## 2020-05-04 RX ADMIN — HEPARIN SODIUM SCH UNIT: 5000 INJECTION, SOLUTION INTRAVENOUS; SUBCUTANEOUS at 10:52

## 2020-05-04 RX ADMIN — INSULIN LISPRO SCH UNITS: 100 INJECTION, SOLUTION INTRAVENOUS; SUBCUTANEOUS at 11:59

## 2020-05-04 RX ADMIN — PROCHLORPERAZINE EDISYLATE PRN MG: 5 INJECTION INTRAMUSCULAR; INTRAVENOUS at 06:06

## 2020-05-04 RX ADMIN — MEROPENEM SCH MLS/HR: 1 INJECTION, POWDER, FOR SOLUTION INTRAVENOUS at 23:06

## 2020-05-04 RX ADMIN — HEPARIN SODIUM SCH UNIT: 5000 INJECTION, SOLUTION INTRAVENOUS; SUBCUTANEOUS at 21:23

## 2020-05-04 RX ADMIN — ONDANSETRON PRN MG: 2 INJECTION INTRAMUSCULAR; INTRAVENOUS at 02:11

## 2020-05-04 RX ADMIN — ONDANSETRON PRN MG: 2 INJECTION INTRAMUSCULAR; INTRAVENOUS at 21:18

## 2020-05-04 NOTE — NUR
SS following up with discharge planning. SS discussed with pt RN. Pt's daughter, Glenna, has 
appointment with [facility] on 5/7/2020 at 1500 to complete disability packet. Pt is self pay pt. 
Pt has trach shield, TPN, and IV antibiotic. Pt able to sit up in chair. PT/OT working with 
pt. SS will continue to follow for discharge planning.

## 2020-05-04 NOTE — RAD
EXAM: Abdomen sonogram.

 

HISTORY: Fever and pain. Ascites check.

 

TECHNIQUE: Sonographic imaging of the abdomen was performed.

 

COMPARISON: CT dated 4/22/2020.

 

FINDINGS: There is a small amount of complex fluid within the right and 

lower quadrants. The larger fluid collection is within the left lower 

quadrant and measures approximately 12 cm in maximum dimension.

 

IMPRESSION: Complex fluid collections within the right and lower 

quadrants. The abdominal visceral and vascular structures are not formally

assessed on this exam.

 

Electronically signed by: Irish Rios MD (5/4/2020 10:52 AM) [id number]

## 2020-05-04 NOTE — PDOC
Subjective:


Subjective:


Abdomen is more painful ("a little"), asks for mouth swabs.





Objective:


Objective:


D/w nurse - vomiting yesterday, complaints of constant nausea, on vent w/ low 

grade fever, wonders about NGT replacement.


Strict NPO (no swabs), concern for aspiration.


Since I have seen, abd US ordered re: ascites.


Vital Signs:





                                   Vital Signs








  Date Time  Temp Pulse Resp B/P (MAP) Pulse Ox O2 Delivery O2 Flow Rate FiO2


 


5/4/20 08:00     97 Ventilator  


 


5/4/20 06:00  106 41 120/63 (82)    


 


5/4/20 04:00 100.3       





 100.3       








Labs:





Laboratory Tests








Test


 5/3/20


12:47 5/3/20


16:09 5/3/20


17:57 5/4/20


06:10


 


Glucose (Fingerstick) 126 mg/dL   148 mg/dL  


 


O2 Saturation  93 %   


 


Arterial Blood pH  7.47   


 


Arterial Blood pCO2 at


Patient Temp 


 49 mmHg 


 


 





 


Arterial Blood pO2 at Patient


Temp 


 73 mmHg 


 


 





 


Arterial Blood HCO3  35 mmol/L   


 


Arterial Blood Base Excess  10 mmol/L   


 


Oxyhemoglobin  92.7 %   


 


Methemoglobin  0.4 %   


 


Carbon Monoxide, Quantitative  0.3 %   


 


FiO2  30   


 


Sodium Level    154 mmol/L 


 


Potassium Level    4.0 mmol/L 


 


Chloride Level    114 mmol/L 


 


Carbon Dioxide Level    33 mmol/L 


 


Anion Gap    7 


 


Blood Urea Nitrogen    45 mg/dL 


 


Creatinine    0.9 mg/dL 


 


Estimated GFR


(Cockcroft-Gault) 


 


 


 66.5 





 


BUN/Creatinine Ratio    50 


 


Glucose Level    164 mg/dL 


 


Calcium Level    8.8 mg/dL 


 


Phosphorus Level    3.4 mg/dL 


 


Magnesium Level    2.2 mg/dL 


 


Total Bilirubin    0.5 mg/dL 


 


Aspartate Amino Transf


(AST/SGOT) 


 


 


 39 U/L 





 


Alanine Aminotransferase


(ALT/SGPT) 


 


 


 36 U/L 





 


Alkaline Phosphatase    127 U/L 


 


Total Protein    5.6 g/dL 


 


Albumin    1.8 g/dL 


 


Albumin/Globulin Ratio    0.5 


 


Triglycerides Level    174 mg/dL 


 


Test


 5/4/20


06:15 


 


 





 


Glucose (Fingerstick) 153 mg/dL    








COMMENTS: CATH TIP CENTRAL LINE                                       


----------------------------

----------------------------------------------------------------





  Procedure                         Result                                      

         


 

--------------------------------------------------------------------------------


------------





  AEROBIC CULTURE  Preliminary  


        Preliminary report





  AEROBIC RES 1  Preliminary  


        Comment





        No growth in 36 - 48 hours.


        Performed at:  [facility], [place], [place]  [id number]


        : JEFFREY Hathaway MD, Phone:  [phone]





  GRAM STAIN  Final  


        Final report





  GRAM STAIN RESULT 1  Final  


        Comment





        No white blood cells seen.





  GRAM STAIN RESULT 2  Final  


        No organisms seen








  BLOOD CULTURE  Preliminary  


        NO GROWTH AFTER 4 DAYS   








COMMENTS: ASCITES                                                     


-------------------

-------------------------------------------------------------------------





  Procedure                         Result                                      

         


 

--------------------------------------------------------------------------------


------------





  ANAEROBIC-AEROBIC CULTURE  Final  


        Final report





  ANAEROBIC RES 1  Final  


        Comment





        No anaerobic growth in 72 hours.





  AEROBIC CULT  Final  


        Final report





  AEROBIC RES 1  Final  


        Yeast isolated.





        1+


        Request for further identification must be made


        within 1 week.





  GRAM STAIN  Final  


        Final report





  GRAM STAIN RES 1  Final  


        Comment





        No white blood cells seen.





  GRAM STAIN RES 2  Final  


        No organisms seen





PE:





GEN: NAD


LUNGS: trach/vent, diminished


HEART: mildly tachycardic


ABD: distended, quiet, tender


NEURO/PSYCH: A & O 3





A/P:


Gallstone pancreatitis, MOSF


N/v, abd pain





--


Reviewed w/ Dr. Bhatia - seems NG might be a good idea.





Hemodynamically unstable?:  No


Is patient in severe pain?:  Yes


Is NPO status required?:  Yes











RAGHAVENDRA MURCIA          May 4, 2020 10:44

## 2020-05-04 NOTE — NUR
Patient c/o pain in abd, rated it a 9. States she just doesn't feel good now. c/o "some 
nausea". Dilaudid was given at 2112 and Zofran was given at this time. Turned patient and 
replaced the cool cloth on her forehead.

## 2020-05-04 NOTE — PDOC
PROGRESS NOTES


Assessment


Problems


Medical Problems:


(1) Acute pancreatitis


Status: Acute  





(2) Cholelithiasis


Status: Acute  





Respiratory failure.


Seizure.


Metabolic encephalopathy.


Fevers.


Metabolic acidosis.


Diffuse pulmonary infiltrate.


Pleural effusion.


Pancreatitis, necrotizing.


Gallstone.


Leukocytosis.


Lymphopenia.


Electrolytes imbalances.


Hyperglycemia.


DM.


HTN.


HLD.


Anemia.


Abnormal CXR.


Obesity.


Ascites and pleural effusion


Plan


Keppra if she has further seizures.


Treat medical diseases.


Subjective


none


Objective





Vital Signs








  Date Time  Temp Pulse Resp B/P (MAP) Pulse Ox O2 Delivery O2 Flow Rate FiO2


 


5/4/20 08:00     97 Ventilator  


 


5/4/20 06:00  106 41 120/63 (82)    


 


5/4/20 04:00 100.3       





 100.3       














Intake and Output 


 


 5/4/20





 07:00


 


Intake Total 2805.8 ml


 


Output Total 2355 ml


 


Balance 450.8 ml


 


 


 


IV Total 2805.8 ml


 


Output Urine Total 2340 ml


 


Drainage Total 15 ml








PHYSICAL EXAM


Alert. Tracheostomy in place. Mouths replies. Tracks with her eyes, moves 

extremities to commands


PERRL.


EOMI.


CN: no focal findings.


Muscle tone: normal.


Muscle strength: Moves all extremities, 3/5 strength in arms, 2/5 and legs


DTR: 1+


Plantar reflex: Silent


Gait: not examined in bed.


Sensory exam: no abnormal findings.


No cerebellar signs elicited.


Review of Relevant


I have reviewed the following items josy (where applicable) has been applied.


Labs





Laboratory Tests








Test


 5/2/20


13:34 5/2/20


18:06 5/3/20


00:03 5/3/20


05:25


 


Glucose (Fingerstick)


 146 mg/dL


(70-99) 135 mg/dL


(70-99) 122 mg/dL


(70-99) 113 mg/dL


(70-99)


 


Test


 5/3/20


05:30 5/3/20


07:55 5/3/20


12:47 5/3/20


16:09


 


White Blood Count


 9.7 x10^3/uL


(4.0-11.0) 


 


 





 


Red Blood Count


 2.44 x10^6/uL


(3.50-5.40) 


 


 





 


Hemoglobin


 7.1 g/dL


(12.0-15.5) 


 


 





 


Hematocrit


 22.8 %


(36.0-47.0) 


 


 





 


Mean Corpuscular Volume 93 fL ()    


 


Mean Corpuscular Hemoglobin 29 pg (25-35)    


 


Mean Corpuscular Hemoglobin


Concent 31 g/dL


(31-37) 


 


 





 


Red Cell Distribution Width


 18.1 %


(11.5-14.5) 


 


 





 


Platelet Count


 408 x10^3/uL


(140-400) 


 


 





 


Neutrophils (%) (Auto) 79 % (31-73)    


 


Lymphocytes (%) (Auto) 14 % (24-48)    


 


Monocytes (%) (Auto) 5 % (0-9)    


 


Eosinophils (%) (Auto) 2 % (0-3)    


 


Basophils (%) (Auto) 0 % (0-3)    


 


Neutrophils # (Auto)


 7.7 x10^3/uL


(1.8-7.7) 


 


 





 


Lymphocytes # (Auto)


 1.4 x10^3/uL


(1.0-4.8) 


 


 





 


Monocytes # (Auto)


 0.5 x10^3/uL


(0.0-1.1) 


 


 





 


Eosinophils # (Auto)


 0.2 x10^3/uL


(0.0-0.7) 


 


 





 


Basophils # (Auto)


 0.0 x10^3/uL


(0.0-0.2) 


 


 





 


Sodium Level


 153 mmol/L


(136-145) 


 


 





 


Potassium Level


 4.3 mmol/L


(3.5-5.1) 


 


 





 


Chloride Level


 114 mmol/L


() 


 


 





 


Carbon Dioxide Level


 38 mmol/L


(21-32) 


 


 





 


Anion Gap 1 (6-14)    


 


Blood Urea Nitrogen


 47 mg/dL


(7-20) 


 


 





 


Creatinine


 1.0 mg/dL


(0.6-1.0) 


 


 





 


Estimated GFR


(Cockcroft-Gault) 58.9 


 


 


 





 


Glucose Level


 125 mg/dL


(70-99) 


 


 





 


Calcium Level


 8.7 mg/dL


(8.5-10.1) 


 


 





 


O2 Saturation  97 % (92-99)   93 % (92-99) 


 


Arterial Blood pH


 


 7.27


(7.35-7.45) 


 7.47


(7.35-7.45)


 


Arterial Blood pCO2 at


Patient Temp 


 75 mmHg


(35-46) 


 49 mmHg


(35-46)


 


Arterial Blood pO2 at Patient


Temp 


 123 mmHg


() 


 73 mmHg


()


 


Arterial Blood HCO3


 


 34 mmol/L


(21-28) 


 35 mmol/L


(21-28)


 


Arterial Blood Base Excess


 


 6 mmol/L


(-3-3) 


 10 mmol/L


(-3-3)


 


Oxyhemoglobin  96.8 %   92.7 % 


 


Methemoglobin


 


 0.5 %


(0.0-1.9) 


 0.4 %


(0.0-1.9)


 


Carbon Monoxide, Quantitative


 


 0.1 %


(0.0-1.9) 


 0.3 %


(0.0-1.9)


 


FiO2  40   30 


 


Glucose (Fingerstick)


 


 


 126 mg/dL


(70-99) 





 


Test


 5/3/20


17:57 5/4/20


06:10 5/4/20


06:15 





 


Glucose (Fingerstick)


 148 mg/dL


(70-99) 


 153 mg/dL


(70-99) 





 


Sodium Level


 


 154 mmol/L


(136-145) 


 





 


Potassium Level


 


 4.0 mmol/L


(3.5-5.1) 


 





 


Chloride Level


 


 114 mmol/L


() 


 





 


Carbon Dioxide Level


 


 33 mmol/L


(21-32) 


 





 


Anion Gap  7 (6-14)   


 


Blood Urea Nitrogen


 


 45 mg/dL


(7-20) 


 





 


Creatinine


 


 0.9 mg/dL


(0.6-1.0) 


 





 


Estimated GFR


(Cockcroft-Gault) 


 66.5 


 


 





 


BUN/Creatinine Ratio  50 (6-20)   


 


Glucose Level


 


 164 mg/dL


(70-99) 


 





 


Calcium Level


 


 8.8 mg/dL


(8.5-10.1) 


 





 


Phosphorus Level


 


 3.4 mg/dL


(2.6-4.7) 


 





 


Magnesium Level


 


 2.2 mg/dL


(1.8-2.4) 


 





 


Total Bilirubin


 


 0.5 mg/dL


(0.2-1.0) 


 





 


Aspartate Amino Transf


(AST/SGOT) 


 39 U/L (15-37) 


 


 





 


Alanine Aminotransferase


(ALT/SGPT) 


 36 U/L (14-59) 


 


 





 


Alkaline Phosphatase


 


 127 U/L


() 


 





 


Total Protein


 


 5.6 g/dL


(6.4-8.2) 


 





 


Albumin


 


 1.8 g/dL


(3.4-5.0) 


 





 


Albumin/Globulin Ratio  0.5 (1.0-1.7)   


 


Triglycerides Level


 


 174 mg/dL


(0-150) 


 











Laboratory Tests








Test


 5/3/20


12:47 5/3/20


16:09 5/3/20


17:57 5/4/20


06:10


 


Glucose (Fingerstick)


 126 mg/dL


(70-99) 


 148 mg/dL


(70-99) 





 


O2 Saturation  93 % (92-99)   


 


Arterial Blood pH


 


 7.47


(7.35-7.45) 


 





 


Arterial Blood pCO2 at


Patient Temp 


 49 mmHg


(35-46) 


 





 


Arterial Blood pO2 at Patient


Temp 


 73 mmHg


() 


 





 


Arterial Blood HCO3


 


 35 mmol/L


(21-28) 


 





 


Arterial Blood Base Excess


 


 10 mmol/L


(-3-3) 


 





 


Oxyhemoglobin  92.7 %   


 


Methemoglobin


 


 0.4 %


(0.0-1.9) 


 





 


Carbon Monoxide, Quantitative


 


 0.3 %


(0.0-1.9) 


 





 


FiO2  30   


 


Sodium Level


 


 


 


 154 mmol/L


(136-145)


 


Potassium Level


 


 


 


 4.0 mmol/L


(3.5-5.1)


 


Chloride Level


 


 


 


 114 mmol/L


()


 


Carbon Dioxide Level


 


 


 


 33 mmol/L


(21-32)


 


Anion Gap    7 (6-14) 


 


Blood Urea Nitrogen


 


 


 


 45 mg/dL


(7-20)


 


Creatinine


 


 


 


 0.9 mg/dL


(0.6-1.0)


 


Estimated GFR


(Cockcroft-Gault) 


 


 


 66.5 





 


BUN/Creatinine Ratio    50 (6-20) 


 


Glucose Level


 


 


 


 164 mg/dL


(70-99)


 


Calcium Level


 


 


 


 8.8 mg/dL


(8.5-10.1)


 


Phosphorus Level


 


 


 


 3.4 mg/dL


(2.6-4.7)


 


Magnesium Level


 


 


 


 2.2 mg/dL


(1.8-2.4)


 


Total Bilirubin


 


 


 


 0.5 mg/dL


(0.2-1.0)


 


Aspartate Amino Transf


(AST/SGOT) 


 


 


 39 U/L (15-37) 





 


Alanine Aminotransferase


(ALT/SGPT) 


 


 


 36 U/L (14-59) 





 


Alkaline Phosphatase


 


 


 


 127 U/L


()


 


Total Protein


 


 


 


 5.6 g/dL


(6.4-8.2)


 


Albumin


 


 


 


 1.8 g/dL


(3.4-5.0)


 


Albumin/Globulin Ratio    0.5 (1.0-1.7) 


 


Triglycerides Level


 


 


 


 174 mg/dL


(0-150)


 


Test


 5/4/20


06:15 


 


 





 


Glucose (Fingerstick)


 153 mg/dL


(70-99) 


 


 











Microbiology


4/30/20 Aerobic Culture - Preliminary, Resulted


          


4/30/20 Aerobic Culture Result 1 ([facility]) - Preliminary, Resulted


          


4/30/20 Gram Stain - Final, Resulted


          


4/30/20 Gram Stain Result 1 ([facility]) - Final, Resulted


          


4/30/20 Gram Stain Result 2 ([facility]) - Final, Resulted


          


4/29/20 Blood Culture - Preliminary, Resulted


          NO GROWTH AFTER 4 DAYS


4/27/20 Aerobic and Anaerobic Culture - Final, Complete


          


4/27/20 Anaerobic Culture Result 1 ([facility]) - Final, Complete


          


4/27/20 Aerobic Culture - Final, Complete


          


4/27/20 Aerobic Culture Result 1 ([facility]) - Final, Complete


          


4/27/20 Gram Stain - Final, Complete


          


4/27/20 Gram Stain Result 1 ([facility]) - Final, Complete


          


4/27/20 Gram Stain Result 2 ([facility]) - Final, Complete


          


4/12/20 Urine Culture - Final, Complete


          


4/12/20 Urine Culture Result 1 ([facility]) - Final, Complete


Medications





Current Medications


Sodium Chloride 1,000 ml @  1,000 mls/hr Q1H IV  Last administered on 3/16/20at 

03:00;  Start 3/16/20 at 03:00;  Stop 3/16/20 at 03:59;  Status DC


Ondansetron HCl (Zofran) 4 mg 1X  ONCE IVP  Last administered on 3/16/20at 

03:27;  Start 3/16/20 at 03:00;  Stop 3/16/20 at 03:01;  Status DC


Morphine Sulfate (Morphine Sulfate) 4 mg 1X  ONCE IV ;  Start 3/16/20 at 03:00; 

Stop 3/16/20 at 03:01;  Status Cancel


Ketorolac Tromethamine (Toradol 30mg Vial) 30 mg 1X  ONCE IV  Last administered 

on 3/16/20at 02:54;  Start 3/16/20 at 03:00;  Stop 3/16/20 at 03:01;  Status DC


Fentanyl Citrate (Fentanyl 2ml Vial) 25 mcg 1X  ONCE IVP  Last administered on 

3/16/20at 03:23;  Start 3/16/20 at 03:30;  Stop 3/16/20 at 03:31;  Status DC


Fentanyl Citrate (Fentanyl 2ml Vial) 100 mcg STK-MED ONCE .ROUTE ;  Start 

3/16/20 at 03:18;  Stop 3/16/20 at 03:18;  Status DC


Iohexol (Omnipaque 350 Mg/ml) 90 ml 1X  ONCE IV  Last administered on 3/16/20at 

03:25;  Start 3/16/20 at 03:30;  Stop 3/16/20 at 03:31;  Status DC


Info (CONTRAST GIVEN -- Rx MONITORING) 1 each PRN DAILY  PRN MC SEE COMMENTS;  

Start 3/16/20 at 03:30;  Stop 3/18/20 at 03:29;  Status DC


Hydromorphone HCl (Dilaudid) 0.5 mg 1X  ONCE IV  Last administered on 3/16/20at 

03:55;  Start 3/16/20 at 04:30;  Stop 3/16/20 at 04:32;  Status DC


Ondansetron HCl (Zofran) 4 mg PRN Q8HRS  PRN IV NAUSEA/VOMITING 1ST CHOICE;  

Start 3/16/20 at 05:00;  Stop 3/16/20 at 09:27;  Status DC


Morphine Sulfate (Morphine Sulfate) 2 mg PRN Q2HR  PRN IV SEVERE PAIN 7-10 Last 

administered on 3/17/20at 12:26;  Start 3/16/20 at 05:00;  Stop 3/17/20 at 

14:15;  Status DC


Sodium Chloride 1,000 ml @  125 mls/hr Q8H IV  Last administered on 3/16/20at 

20:56;  Start 3/16/20 at 05:00;  Stop 3/17/20 at 04:59;  Status DC


Hydromorphone HCl (Dilaudid) 0.5 mg PRN Q3HRS  PRN IV SEVERE PAIN 7-10 Last 

administered on 3/17/20at 10:06;  Start 3/16/20 at 05:00;  Stop 3/17/20 at 

12:01;  Status DC


Piperacillin Sod/ Tazobactam Sod 4.5 gm/Sodium Chloride 100 ml @  200 mls/hr 1X 

ONCE IV  Last administered on 3/16/20at 05:44;  Start 3/16/20 at 06:00;  Stop 

3/16/20 at 06:29;  Status DC


Ondansetron HCl (Zofran) 4 mg PRN Q4HRS  PRN IV NAUSEA/VOMITING 1ST CHOICE Last 

administered on 5/4/20at 02:11;  Start 3/16/20 at 09:30


Insulin Human Lispro (HumaLOG) 0-9 UNITS Q6HRS SQ  Last administered on 

4/30/20at 17:29;  Start 3/16/20 at 09:30


Dextrose (Dextrose 50%-Water Syringe) 12.5 gm PRN Q15MIN  PRN IV SEE COMMENTS;  

Start 3/16/20 at 09:30


Pantoprazole Sodium (PROTONIX VIAL for IV PUSH) 40 mg DAILYAC IVP  Last 

administered on 5/3/20at 09:19;  Start 3/16/20 at 11:30


Prochlorperazine Edisylate (Compazine) 10 mg PRN Q6HRS  PRN IV NAUSEA/VOMITING, 

2nd CHOICE Last administered on 5/4/20at 06:06;  Start 3/16/20 at 17:45


Atenolol (Tenormin) 100 mg DAILY PO ;  Start 3/17/20 at 09:00;  Stop 3/16/20 at 

20:08;  Status DC


Metoprolol Tartrate (Lopressor Vial) 2.5 mg Q6HRS IVP  Last administered on 

3/17/20at 05:51;  Start 3/16/20 at 20:15;  Stop 3/17/20 at 10:02;  Status DC


Metoprolol Tartrate (Lopressor Vial) 5 mg Q6HRS IVP  Last administered on 

3/26/20at 00:12;  Start 3/17/20 at 10:15;  Stop 3/28/20 at 08:48;  Status DC


Hydromorphone HCl (Dilaudid) 1 mg PRN Q3HRS  PRN IV SEVERE PAIN 7-10 Last 

administered on 3/23/20at 05:13;  Start 3/17/20 at 12:00;  Stop 3/31/20 at 

00:25;  Status DC


Lidocaine HCl (Buffered Lidocaine 1%) 3 ml STK-MED ONCE .ROUTE ;  Start 3/17/20 

at 12:55;  Stop 3/17/20 at 12:56;  Status DC


Albumin Human 500 ml @  125 mls/hr 1X  ONCE IV  Last administered on 3/17/20at 

14:33;  Start 3/17/20 at 14:30;  Stop 3/17/20 at 18:32;  Status DC


Norepinephrine Bitartrate 8 mg/ Dextrose 258 ml @  17.299 mls/ hr CONT  PRN IV 

PER PROTOCOL Last administered on 4/14/20at 12:48;  Start 3/17/20 at 15:30;  

Stop 4/17/20 at 09:19;  Status DC


Sodium Chloride 1,000 ml @  125 mls/hr Q8H IV  Last administered on 3/17/20at 

21:04;  Start 3/17/20 at 16:00;  Stop 3/18/20 at 02:42;  Status DC


Albumin Human 500 ml @  125 mls/hr PRN BID  PRN IV After every 2L NSS & BP < 

90mm Last administered on 4/1/20at 14:21;  Start 3/17/20 at 16:00


Iohexol (Omnipaque 300 Mg/ml) 60 ml 1X  ONCE IV  Last administered on 3/17/20at 

17:20;  Start 3/17/20 at 17:00;  Stop 3/17/20 at 17:01;  Status DC


Info (CONTRAST GIVEN -- Rx MONITORING) 1 each PRN DAILY  PRN MC SEE COMMENTS;  

Start 3/17/20 at 17:00;  Stop 3/19/20 at 16:59;  Status DC


Meropenem 1 gm/ Sodium Chloride 100 ml @  200 mls/hr Q8HRS IV  Last administered

on 3/18/20at 05:45;  Start 3/17/20 at 20:00;  Stop 3/18/20 at 08:48;  Status DC


Furosemide (Lasix) 40 mg 1X  ONCE IVP  Last administered on 3/17/20at 22:12;  

Start 3/17/20 at 22:30;  Stop 3/17/20 at 22:31;  Status DC


Calcium Chloride 1000 mg/Sodium Chloride 110 ml @  220 mls/hr 1X  ONCE IV  Last 

administered on 3/17/20at 22:11;  Start 3/17/20 at 22:30;  Stop 3/17/20 at 

22:59;  Status DC


Albuterol Sulfate (Ventolin Neb Soln) 2.5 mg 1X  ONCE NEB  Last administered on 

3/18/20at 00:56;  Start 3/17/20 at 22:30;  Stop 3/17/20 at 22:31;  Status DC


Insulin Human Regular (HumuLIN R VIAL) 5 unit 1X  ONCE IV  Last administered on 

3/17/20at 22:14;  Start 3/17/20 at 22:30;  Stop 3/17/20 at 22:31;  Status DC


Magnesium Sulfate 50 ml @ 25 mls/hr 1X  ONCE IV  Last administered on 3/18/20at 

02:57;  Start 3/18/20 at 03:00;  Stop 3/18/20 at 04:59;  Status DC


Calcium Gluconate 1000 mg/Sodium Chloride 110 ml @  220 mls/hr 1X  ONCE IV  Last

administered on 3/18/20at 02:46;  Start 3/18/20 at 03:00;  Stop 3/18/20 at 

03:29;  Status DC


Sodium Chloride 1,000 ml @  200 mls/hr Q5H IV  Last administered on 3/18/20at 

02:46;  Start 3/18/20 at 03:00;  Stop 3/18/20 at 10:21;  Status DC


Calcium Gluconate 1000 mg/Sodium Chloride 110 ml @  220 mls/hr 1X  ONCE IV  Last

administered on 3/18/20at 03:21;  Start 3/18/20 at 03:30;  Stop 3/18/20 at 

03:59;  Status DC


Sodium Bicarbonate 50 meq/Sodium Chloride 1,050 ml @  75 mls/hr Q14H IV  Last 

administered on 3/22/20at 21:10;  Start 3/18/20 at 07:30;  Stop 3/23/20 at 

10:28;  Status DC


Calcium Gluconate 2000 mg/Sodium Chloride 120 ml @  220 mls/hr 1X  ONCE IV  Last

administered on 3/18/20at 09:05;  Start 3/18/20 at 07:30;  Stop 3/18/20 at 

08:02;  Status DC


Lidocaine HCl (Xylocaine-Mpf 1% 2ml Vial) 2 ml STK-MED ONCE .ROUTE ;  Start 

3/18/20 at 08:47;  Stop 3/18/20 at 08:47;  Status DC


Meropenem 500 mg/ Sodium Chloride 50 ml @  100 mls/hr Q12HR IV  Last 

administered on 3/23/20at 21:01;  Start 3/18/20 at 18:00;  Stop 3/24/20 at 

07:58;  Status DC


Lidocaine HCl (Buffered Lidocaine 1%) 3 ml STK-MED ONCE .ROUTE ;  Start 3/18/20 

at 09:46;  Stop 3/18/20 at 09:46;  Status DC


Lidocaine HCl (Buffered Lidocaine 1%) 6 ml 1X  ONCE INJ  Last administered on 

3/18/20at 10:26;  Start 3/18/20 at 10:15;  Stop 3/18/20 at 10:16;  Status DC


Info (Tpn Per Pharmacy) 1 each PRN DAILY  PRN MC SEE COMMENTS Last administered 

on 5/3/20at 12:44;  Start 3/18/20 at 12:00


Sodium Chloride 1,000 ml @  1,000 mls/hr Q1H PRN IV hypotension;  Start 3/18/20 

at 12:07;  Stop 3/18/20 at 18:06;  Status DC


Diphenhydramine HCl (Benadryl) 25 mg 1X PRN  PRN IV ITCHING;  Start 3/18/20 at 

12:15;  Stop 3/19/20 at 12:14;  Status DC


Diphenhydramine HCl (Benadryl) 25 mg 1X PRN  PRN IV ITCHING;  Start 3/18/20 at 

12:15;  Stop 3/19/20 at 12:14;  Status DC


Sodium Chloride 1,000 ml @  400 mls/hr Q2H30M PRN IV PATENCY;  Start 3/18/20 at 

12:07;  Stop 3/19/20 at 00:06;  Status DC


Info (PHARMACY MONITORING -- do not chart) 1 each PRN DAILY  PRN MC SEE C

OMMENTS;  Start 3/18/20 at 12:15;  Stop 3/20/20 at 08:13;  Status DC


Sodium Chloride 90 meq/Calcium Gluconate 10 meq/ Multivitamins 10 ml/Chromium/ 

Copper/Manganese/ Seleni/Zn 1 ml/ Total Parenteral Nutrition/Amino 

Acids/Dextrose/ Fat Emulsion Intravenous 55.005 ml  @ 2.292 mls/hr TPN  CONT IV 

;  Start 3/18/20 at 22:00;  Stop 3/18/20 at 12:33;  Status DC


Info (Tpn Per Pharmacy) 1 each PRN DAILY  PRN MC SEE COMMENTS;  Start 3/18/20 at

12:30;  Status UNV


Sodium Chloride 90 meq/Calcium Gluconate 10 meq/ Multivitamins 10 ml/Chromium/ 

Copper/Manganese/ Seleni/Zn 0.5 ml/ Total Parenteral Nutrition/Amino 

Acids/Dextrose/ Fat Emulsion Intravenous 1,512 ml @  63 mls/hr TPN  CONT IV  

Last administered on 3/18/20at 22:06;  Start 3/18/20 at 22:00;  Stop 3/19/20 at 

21:59;  Status DC


Calcium Carbonate/ Glycine (Tums) 500 mg PRN AFTMEALHC  PRN PO INDIGESTION;  

Start 3/18/20 at 17:45


Calcium Gluconate (Calcium Gluconate) 2,000 mg 1X  ONCE IVP  Last administered 

on 3/19/20at 02:19;  Start 3/19/20 at 02:15;  Stop 3/19/20 at 02:16;  Status DC


Calcium Chloride 3000 mg/Sodium Chloride 1,030 ml @  50 mls/hr [id number] IV  Last 

administered on 3/21/20at 02:17;  Start 3/19/20 at 08:00;  Stop 3/21/20 at 

15:23;  Status DC


Lorazepam (Ativan Inj) 1 mg PRN Q4HRS  PRN IVP ANXIETY / AGITATION, 2nd choic 

Last administered on 4/17/20at 03:51;  Start 3/19/20 at 09:00;  Stop 4/17/20 at 

09:19;  Status DC


Sodium Chloride 1,000 ml @  1,000 mls/hr Q1H PRN IV hypotension;  Start 3/19/20 

at 08:56;  Stop 3/19/20 at 14:55;  Status DC


Albumin Human 200 ml @  200 mls/hr 1X PRN  PRN IV Hypotension;  Start 3/19/20 at

09:00;  Stop 3/19/20 at 14:59;  Status DC


Diphenhydramine HCl (Benadryl) 25 mg 1X PRN  PRN IV ITCHING;  Start 3/19/20 at 

09:00;  Stop 3/20/20 at 08:59;  Status DC


Diphenhydramine HCl (Benadryl) 25 mg 1X PRN  PRN IV ITCHING;  Start 3/19/20 at 

09:00;  Stop 3/20/20 at 08:59;  Status DC


Sodium Chloride 1,000 ml @  400 mls/hr Q2H30M PRN IV PATENCY;  Start 3/19/20 at 

08:56;  Stop 3/19/20 at 20:55;  Status DC


Info (PHARMACY MONITORING -- do not chart) 1 each PRN DAILY  PRN MC SEE 

COMMENTS;  Start 3/19/20 at 09:00;  Status UNV


Info (PHARMACY MONITORING -- do not chart) 1 each PRN DAILY  PRN MC SEE 

COMMENTS;  Start 3/19/20 at 09:00;  Stop 3/20/20 at 08:13;  Status DC


Digoxin (Lanoxin) 500 mcg 1X  ONCE IV  Last administered on 3/19/20at 10:04;  

Start 3/19/20 at 10:00;  Stop 3/19/20 at 10:01;  Status DC


Digoxin (Lanoxin) 125 mcg 1X  ONCE IV  Last administered on 3/19/20at 17:10;  

Start 3/19/20 at 18:00;  Stop 3/19/20 at 18:01;  Status DC


Magnesium Sulfate 100 ml @  25 mls/hr 1X  ONCE IV  Last administered on 

3/19/20at 12:48;  Start 3/19/20 at 13:00;  Stop 3/19/20 at 16:59;  Status DC


Sodium Chloride 90 meq/Magnesium Sulfate 10 meq/ Calcium Gluconate 20 meq/ 

Multivitamins 10 ml/Chromium/ Copper/Manganese/ Seleni/Zn 0.5 ml/ Total 

Parenteral Nutrition/Amino Acids/Dextrose/ Fat Emulsion Intravenous 1,512 ml @  

63 mls/hr TPN  CONT IV  Last administered on 3/19/20at 22:25;  Start 3/19/20 at 

22:00;  Stop 3/20/20 at 21:59;  Status DC


Sodium Chloride 1,000 ml @  1,000 mls/hr Q1H PRN IV hypotension;  Start 3/20/20 

at 08:05;  Stop 3/20/20 at 14:04;  Status DC


Albumin Human 200 ml @  200 mls/hr 1X  ONCE IV  Last administered on 3/20/20at 

08:57;  Start 3/20/20 at 08:15;  Stop 3/20/20 at 09:14;  Status DC


Diphenhydramine HCl (Benadryl) 25 mg 1X PRN  PRN IV ITCHING;  Start 3/20/20 at 

08:15;  Stop 3/21/20 at 08:14;  Status DC


Diphenhydramine HCl (Benadryl) 25 mg 1X PRN  PRN IV ITCHING;  Start 3/20/20 at 

08:15;  Stop 3/21/20 at 08:14;  Status DC


Sodium Chloride 1,000 ml @  400 mls/hr Q2H30M PRN IV PATENCY;  Start 3/20/20 at 

08:05;  Stop 3/20/20 at 20:04;  Status DC


Info (PHARMACY MONITORING -- do not chart) 1 each PRN DAILY  PRN MC SEE 

COMMENTS;  Start 3/20/20 at 08:15;  Stop 3/24/20 at 07:57;  Status DC


Sodium Chloride 90 meq/Potassium Chloride 15 meq/ Potassium Phosphate 10 mmol/ 

Magnesium Sulfate 10 meq/Calcium Gluconate 20 meq/ Multivitamins 10 ml/Chromium/

Copper/Manganese/ Seleni/Zn 0.5 ml/ Total Parenteral Nutrition/Amino 

Acids/Dextrose/ Fat Emulsion Intravenous 1,512 ml @  63 mls/hr TPN  CONT IV  

Last administered on 3/20/20at 21:01;  Start 3/20/20 at 22:00;  Stop 3/21/20 at 

21:59;  Status DC


Potassium Chloride/Water 100 ml @  100 mls/hr 1X  ONCE IV  Last administered on 

3/20/20at 14:09;  Start 3/20/20 at 14:00;  Stop 3/20/20 at 14:59;  Status DC


Benzocaine (Hurricaine One) 1 spray 1X  ONCE MM  Last administered on 3/20/20at 

16:38;  Start 3/20/20 at 14:30;  Stop 3/20/20 at 14:31;  Status DC


Lidocaine HCl (Glydo (Lidocaine) Jelly) 1 thomas 1X  ONCE MM  Last administered on 

3/20/20at 16:38;  Start 3/20/20 at 14:30;  Stop 3/20/20 at 14:31;  Status DC


Linezolid/Dextrose 300 ml @  300 mls/hr Q12HR IV  Last administered on 3/26/20at

21:04;  Start 3/20/20 at 20:00;  Stop 3/27/20 at 07:50;  Status DC


Acetaminophen (Tylenol) 650 mg PRN Q6HRS  PRN PO MILD PAIN / TEMP;  Start 

3/21/20 at 03:30;  Stop 3/21/20 at 03:36;  Status DC


Acetaminophen (Tylenol) 650 mg PRN Q6HRS  PRN PEG MILD PAIN / TEMP Last 

administered on 4/16/20at 19:56;  Start 3/21/20 at 03:36


Sodium Chloride 1,000 ml @  1,000 mls/hr Q1H PRN IV hypotension;  Start 3/21/20 

at 07:50;  Stop 3/21/20 at 13:49;  Status DC


Albumin Human 200 ml @  200 mls/hr 1X PRN  PRN IV Hypotension;  Start 3/21/20 at

08:00;  Stop 3/21/20 at 13:59;  Status DC


Sodium Chloride (Normal Saline Flush) 10 ml 1X PRN  PRN IV AP catheter pack;  

Start 3/21/20 at 08:00;  Stop 3/22/20 at 07:59;  Status DC


Sodium Chloride (Normal Saline Flush) 10 ml 1X PRN  PRN IV  catheter pack;  

Start 3/21/20 at 08:00;  Stop 3/22/20 at 07:59;  Status DC


Sodium Chloride 1,000 ml @  400 mls/hr Q2H30M PRN IV PATENCY;  Start 3/21/20 at 

07:50;  Stop 3/21/20 at 19:49;  Status DC


Info (PHARMACY MONITORING -- do not chart) 1 each PRN DAILY  PRN MC SEE 

COMMENTS;  Start 3/21/20 at 08:00;  Status UNV


Info (PHARMACY MONITORING -- do not chart) 1 each PRN DAILY  PRN MC SEE 

COMMENTS;  Start 3/21/20 at 08:00;  Stop 3/23/20 at 08:25;  Status DC


Sodium Chloride 90 meq/Potassium Chloride 15 meq/ Potassium Phosphate 10 mmol/ 

Magnesium Sulfate 10 meq/Calcium Gluconate 20 meq/ Multivitamins 10 ml/Chromium/

Copper/Manganese/ Seleni/Zn 0.5 ml/ Total Parenteral Nutrition/Amino 

Acids/Dextrose/ Fat Emulsion Intravenous 1,512 ml @  63 mls/hr TPN  CONT IV  

Last administered on 3/21/20at 20:57;  Start 3/21/20 at 22:00;  Stop 3/22/20 at 

21:59;  Status DC


Sodium Chloride 90 meq/Potassium Chloride 15 meq/ Potassium Phosphate 15 mmol/ 

Magnesium Sulfate 10 meq/Calcium Gluconate 20 meq/ Multivitamins 10 ml/Chromium/

Copper/Manganese/ Seleni/Zn 0.5 ml/ Total Parenteral Nutrition/Amino 

Acids/Dextrose/ Fat Emulsion Intravenous 1,512 ml @  63 mls/hr TPN  CONT IV ;  

Start 3/22/20 at 22:00;  Stop 3/22/20 at 14:16;  Status DC


Sodium Chloride 90 meq/Potassium Chloride 15 meq/ Potassium Phosphate 15 mmol/ 

Magnesium Sulfate 10 meq/Calcium Gluconate 20 meq/ Multivitamins 10 ml/Chromium/

Copper/Manganese/ Seleni/Zn 0.5 ml/ Total Parenteral Nutrition/Amino 

Acids/Dextrose/ Fat Emulsion Intravenous 1,200 ml @  50 mls/hr TPN  CONT IV ;  

Start 3/22/20 at 22:00;  Stop 3/22/20 at 14:17;  Status DC


Sodium Chloride 90 meq/Potassium Chloride 15 meq/ Potassium Phosphate 10 mmol/ 

Magnesium Sulfate 10 meq/Calcium Gluconate 20 meq/ Multivitamins 10 ml/Chromium/

Copper/Manganese/ Seleni/Zn 0.5 ml/ Total Parenteral Nutrition/Amino 

Acids/Dextrose/ Fat Emulsion Intravenous 1,200 ml @  50 mls/hr TPN  CONT IV  

Last administered on 3/22/20at 23:29;  Start 3/22/20 at 22:00;  Stop 3/23/20 at 

21:59;  Status DC


Sodium Chloride 1,000 ml @  1,000 mls/hr Q1H PRN IV hypotension;  Start 3/23/20 

at 07:28;  Stop 3/23/20 at 13:27;  Status DC


Albumin Human 200 ml @  200 mls/hr 1X  ONCE IV  Last administered on 3/23/20at 

08:51;  Start 3/23/20 at 07:30;  Stop 3/23/20 at 08:29;  Status DC


Diphenhydramine HCl (Benadryl) 25 mg 1X PRN  PRN IV ITCHING;  Start 3/23/20 at 

07:30;  Stop 3/24/20 at 07:29;  Status DC


Diphenhydramine HCl (Benadryl) 25 mg 1X PRN  PRN IV ITCHING;  Start 3/23/20 at 

07:30;  Stop 3/24/20 at 07:29;  Status DC


Sodium Chloride 1,000 ml @  400 mls/hr Q2H30M PRN IV PATENCY;  Start 3/23/20 at 

07:28;  Stop 3/23/20 at 19:27;  Status DC


Info (PHARMACY MONITORING -- do not chart) 1 each PRN DAILY  PRN MC SEE 

COMMENTS;  Start 3/23/20 at 07:30;  Stop 4/3/20 at 13:01;  Status DC


Metronidazole 100 ml @  100 mls/hr Q6HRS IV  Last administered on 4/8/20at 

06:26;  Start 3/23/20 at 08:30;  Stop 4/8/20 at 09:58;  Status DC


Micafungin Sodium 100 mg/Dextrose 100 ml @  100 mls/hr Q24H IV  Last 

administered on 4/30/20at 08:18;  Start 3/23/20 at 09:00;  Stop 4/30/20 at 

20:58;  Status DC


Propofol 0 ml @ As Directed STK-MED ONCE IV ;  Start 3/23/20 at 07:53;  Stop 

3/23/20 at 07:53;  Status DC


Etomidate (Amidate) 20 mg STK-MED ONCE IV ;  Start 3/23/20 at 07:53;  Stop 

3/23/20 at 07:54;  Status DC


Midazolam HCl (Versed) 5 mg STK-MED ONCE .ROUTE ;  Start 3/23/20 at 07:57;  Stop

3/23/20 at 07:57;  Status DC


Fentanyl Citrate 30 ml @ 0 mls/hr CONT  PRN IV SEE PROTOCOL Last administered on

4/17/20at 06:12;  Start 3/23/20 at 08:15;  Stop 4/17/20 at 09:19;  Status DC


Artificial Tears (Artificial Tears) 1 drop PRN Q1HR  PRN OU DRY EYE, 1st choice;

 Start 3/23/20 at 08:15;  Stop 4/29/20 at 05:31;  Status DC


Midazolam HCl 50 mg/Sodium Chloride 50 ml @ 0 mls/hr CONT  PRN IV SEE PROTOCOL 

Last administered on 3/26/20at 22:39;  Start 3/23/20 at 08:15;  Stop 3/28/20 at 

15:59;  Status DC


Etomidate (Amidate) 8 mg 1X  ONCE IV  Last administered on 3/23/20at 08:33;  

Start 3/23/20 at 08:30;  Stop 3/23/20 at 08:31;  Status DC


Succinylcholine Chloride (Anectine) 120 mg 1X  ONCE IV  Last administered on 

3/23/20at 08:34;  Start 3/23/20 at 08:30;  Stop 3/23/20 at 08:31;  Status DC


Midazolam HCl (Versed) 5 mg 1X  ONCE IV ;  Start 3/23/20 at 08:30;  Stop 3/23/20

at 08:31;  Status DC


Potassium Chloride 15 meq/ Bicarbonate Dialysis Soln w/ out KCl 5,007.5 ml  @ 

1,000 mls/ hr Q5H1M IV  Last administered on 3/24/20at 11:11;  Start 3/23/20 at 

12:00;  Stop 3/24/20 at 11:15;  Status DC


Potassium Chloride 15 meq/ Bicarbonate Dialysis Soln w/ out KCl 5,007.5 ml  @ 

1,000 mls/ hr Q5H1M IV  Last administered on 3/24/20at 11:12;  Start 3/23/20 at 

12:00;  Stop 3/24/20 at 11:17;  Status DC


Potassium Chloride 15 meq/ Bicarbonate Dialysis Soln w/ out KCl 5,007.5 ml  @ 

1,000 mls/ hr Q5H1M IV  Last administered on 3/24/20at 11:11;  Start 3/23/20 at 

12:00;  Stop 3/24/20 at 11:19;  Status DC


Sodium Chloride 90 meq/Potassium Chloride 15 meq/ Potassium Phosphate 10 mmol/ 

Magnesium Sulfate 10 meq/Calcium Gluconate 20 meq/ Multivitamins 10 ml/Chromium/

Copper/Manganese/ Seleni/Zn 0.5 ml/ Total Parenteral Nutrition/Amino Ac

ids/Dextrose/ Fat Emulsion Intravenous 1,400 ml @  58.333 mls/ hr TPN  CONT IV  

Last administered on 3/23/20at 21:42;  Start 3/23/20 at 22:00;  Stop 3/24/20 at 

21:59;  Status DC


Heparin Sodium (Porcine) (Heparin Sodium) 5,000 unit Q8HRS SQ  Last administered

on 3/28/20at 05:55;  Start 3/23/20 at 15:00;  Stop 3/28/20 at 13:28;  Status DC


Meropenem 500 mg/ Sodium Chloride 50 ml @  100 mls/hr Q6HRS IV  Last 

administered on 3/25/20at 06:00;  Start 3/24/20 at 09:00;  Stop 3/25/20 at 

07:29;  Status DC


Potassium Phosphate 20 mmol/ Sodium Chloride 106.6667 ml @  51.667 m... 1X  ONCE

IV  Last administered on 3/24/20at 11:22;  Start 3/24/20 at 10:15;  Stop 3/24/20

at 12:18;  Status DC


Acetaminophen (Tylenol Supp) 650 mg PRN Q6HRS  PRN UT MILD PAIN / TEMP > 100.3'F

Last administered on 5/3/20at 23:26;  Start 3/24/20 at 10:30


Potassium Chloride/Water 100 ml @  100 mls/hr Q1H IV  Last administered on 

3/24/20at 12:12;  Start 3/24/20 at 11:00;  Stop 3/24/20 at 12:59;  Status DC


Potassium Chloride 20 meq/ Bicarbonate Dialysis Soln w/ out KCl 5,010 ml @  

1,000 mls/hr Q5H1M IV  Last administered on 3/25/20at 08:48;  Start 3/24/20 at 

12:00;  Stop 3/25/20 at 13:03;  Status DC


Potassium Chloride 20 meq/ Bicarbonate Dialysis Soln w/ out KCl 5,010 ml @  

1,000 mls/hr Q5H1M IV  Last administered on 3/29/20at 14:52;  Start 3/24/20 at 

11:30;  Stop 3/29/20 at 19:59;  Status DC


Potassium Chloride 20 meq/ Bicarbonate Dialysis Soln w/ out KCl 5,010 ml @  

1,000 mls/hr Q5H1M IV  Last administered on 3/29/20at 14:53;  Start 3/24/20 at 

11:30;  Stop 3/29/20 at 19:59;  Status DC


Sodium Chloride 90 meq/Potassium Chloride 15 meq/ Potassium Phosphate 15 mmol/ 

Magnesium Sulfate 10 meq/Calcium Gluconate 15 meq/ Multivitamins 10 ml/Chromium/

Copper/Manganese/ Seleni/Zn 0.5 ml/ Total Parenteral Nutrition/Amino 

Acids/Dextrose/ Fat Emulsion Intravenous 1,400 ml @  58.333 mls/ hr TPN  CONT IV

 Last administered on 3/24/20at 22:17;  Start 3/24/20 at 22:00;  Stop 3/25/20 at

21:59;  Status DC


Cefepime HCl (Maxipime) 2 gm Q12HR IVP  Last administered on 4/7/20at 20:56;  

Start 3/25/20 at 09:00;  Stop 4/8/20 at 09:58;  Status DC


Daptomycin 500 mg/ Sodium Chloride 50 ml @  100 mls/hr Q48H IV  Last 

administered on 4/10/20at 09:57;  Start 3/25/20 at 08:30;  Stop 4/10/20 at 

10:07;  Status DC


Lidocaine HCl (Buffered Lidocaine 1%) 3 ml 1X  ONCE INJ  Last administered on 

3/25/20at 10:27;  Start 3/25/20 at 10:30;  Stop 3/25/20 at 10:31;  Status DC


Potassium Phosphate 20 mmol/ Sodium Chloride 106.6667 ml @  51.667 m... 1X  ONCE

IV  Last administered on 3/25/20at 12:51;  Start 3/25/20 at 13:00;  Stop 3/25/20

at 15:03;  Status DC


Sodium Chloride 90 meq/Potassium Chloride 15 meq/ Potassium Phosphate 18 mmol/ 

Magnesium Sulfate 8 meq/Calcium Gluconate 15 meq/ Multivitamins 10 ml/Chromium/ 

Copper/Manganese/ Seleni/Zn 0.5 ml/ Total Parenteral Nutrition/Amino 

Acids/Dextrose/ Fat Emulsion Intravenous 1,400 ml @  58.333 mls/ hr TPN  CONT IV

 Last administered on 3/25/20at 22:16;  Start 3/25/20 at 22:00;  Stop 3/26/20 at

21:59;  Status DC


Potassium Chloride 20 meq/ Bicarbonate Dialysis Soln w/ out KCl 5,010 ml @  

1,000 mls/hr Q5H1M IV  Last administered on 3/29/20at 14:54;  Start 3/25/20 at 

16:00;  Stop 3/29/20 at 19:59;  Status DC


Multi-Ingred Cream/Lotion/Oil/ Oint (Artificial Tears Eye Ointment) 1 thomas PRN 

Q1HR  PRN OU DRY EYE, 2nd choice Last administered on 4/13/20at 08:19;  Start 

3/25/20 at 17:30


Sodium Chloride 90 meq/Potassium Chloride 15 meq/ Potassium Phosphate 18 mmol/ 

Magnesium Sulfate 8 meq/Calcium Gluconate 15 meq/ Multivitamins 10 ml/Chromium/ 

Copper/Manganese/ Seleni/Zn 0.5 ml/ Total Parenteral Nutrition/Amino 

Acids/Dextrose/ Fat Emulsion Intravenous 1,400 ml @  58.333 mls/ hr TPN  CONT IV

 Last administered on 3/26/20at 22:00;  Start 3/26/20 at 22:00;  Stop 3/27/20 at

21:59;  Status DC


Albumin Human 500 ml @  125 mls/hr 1X  ONCE IV ;  Start 3/26/20 at 14:15;  Stop 

3/26/20 at 18:14;  Status DC


Sodium Chloride 90 meq/Potassium Chloride 15 meq/ Potassium Phosphate 18 mmol/ 

Magnesium Sulfate 8 meq/Calcium Gluconate 15 meq/ Multivitamins 10 ml/Chromium/ 

Copper/Manganese/ Seleni/Zn 0.5 ml/ Insulin Human Regular 10 unit/ Total 

Parenteral Nutrition/Amino Acids/Dextrose/ Fat Emulsion Intravenous 1,400 ml @  

58.333 mls/ hr TPN  CONT IV  Last administered on 3/27/20at 21:43;  Start 

3/27/20 at 22:00;  Stop 3/28/20 at 21:59;  Status DC


Lidocaine HCl (Buffered Lidocaine 1%) 3 ml STK-MED ONCE .ROUTE ;  Start 3/25/20 

at 10:00;  Stop 3/27/20 at 13:57;  Status DC


Midazolam HCl 100 mg/Sodium Chloride 100 ml @ 7 mls/hr CONT  PRN IV SEE PROTOCOL

Last administered on 4/8/20at 15:35;  Start 3/28/20 at 16:00


Sodium Chloride 90 meq/Potassium Chloride 15 meq/ Potassium Phosphate 18 mmol/ 

Magnesium Sulfate 8 meq/Calcium Gluconate 15 meq/ Multivitamins 10 ml/Chromium/ 

Copper/Manganese/ Seleni/Zn 0.5 ml/ Insulin Human Regular 15 unit/ Total 

Parenteral Nutrition/Amino Acids/Dextrose/ Fat Emulsion Intravenous 1,400 ml @  

58.333 mls/ hr TPN  CONT IV  Last administered on 3/28/20at 20:34;  Start 

3/28/20 at 22:00;  Stop 3/29/20 at 21:59;  Status DC


Info ([facility] Electrolyte Protocol) 1 ea CONT PRN  PRN MC PER PROTOCOL;  Start 

3/29/20 at 13:15


Sodium Chloride 90 meq/Potassium Chloride 15 meq/ Potassium Phosphate 18 mmol/ 

Magnesium Sulfate 8 meq/Calcium Gluconate 15 meq/ Multivitamins 10 ml/Chromium/ 

Copper/Manganese/ Seleni/Zn 0.5 ml/ Insulin Human Regular 15 unit/ Total 

Parenteral Nutrition/Amino Acids/Dextrose/ Fat Emulsion Intravenous 1,400 ml @  

58.333 mls/ hr TPN  CONT IV  Last administered on 3/29/20at 22:05;  Start 

3/29/20 at 22:00;  Stop 3/30/20 at 21:59;  Status DC


Potassium Chloride 15 meq/ Bicarbonate Dialysis Soln w/ out KCl 5,007.5 ml  @ 

1,000 mls/ hr Q5H1M IV  Last administered on 4/1/20at 18:14;  Start 3/29/20 at 

20:00;  Stop 4/2/20 at 13:08;  Status DC


Potassium Chloride 15 meq/ Bicarbonate Dialysis Soln w/ out KCl 5,007.5 ml  @ 

1,000 mls/ hr Q5H1M IV  Last administered on 4/1/20at 18:14;  Start 3/29/20 at 

20:00;  Stop 4/2/20 at 13:08;  Status DC


Potassium Chloride 15 meq/ Bicarbonate Dialysis Soln w/ out KCl 5,007.5 ml  @ 

1,000 mls/ hr Q5H1M IV  Last administered on 4/1/20at 18:14;  Start 3/29/20 at 

20:00;  Stop 4/2/20 at 13:08;  Status DC


Iohexol (Omnipaque 240 Mg/ml) 30 ml 1X  ONCE PO  Last administered on 3/30/20at 

11:30;  Start 3/30/20 at 11:30;  Stop 3/30/20 at 11:33;  Status DC


Info (CONTRAST GIVEN -- Rx MONITORING) 1 each PRN DAILY  PRN MC SEE COMMENTS;  

Start 3/30/20 at 11:45;  Stop 4/1/20 at 11:44;  Status DC


Sodium Chloride 90 meq/Potassium Chloride 15 meq/ Potassium Phosphate 18 mmol/ 

Magnesium Sulfate 8 meq/Calcium Gluconate 15 meq/ Multivitamins 10 ml/Chromium/ 

Copper/Manganese/ Seleni/Zn 0.5 ml/ Insulin Human Regular 15 unit/ Total 

Parenteral Nutrition/Amino Acids/Dextrose/ Fat Emulsion Intravenous 1,400 ml @  

58.333 mls/ hr TPN  CONT IV  Last administered on 3/30/20at 21:47;  Start 

3/30/20 at 22:00;  Stop 3/31/20 at 21:59;  Status DC


Sodium Chloride 90 meq/Potassium Chloride 15 meq/ Potassium Phosphate 18 mmol/ 

Magnesium Sulfate 8 meq/Calcium Gluconate 15 meq/ Multivitamins 10 ml/Chromium/ 

Copper/Manganese/ Seleni/Zn 0.5 ml/ Insulin Human Regular 20 unit/ Total 

Parenteral Nutrition/Amino Acids/Dextrose/ Fat Emulsion Intravenous 1,400 ml @  

58.333 mls/ hr TPN  CONT IV  Last administered on 3/31/20at 21:36;  Start 

3/31/20 at 22:00;  Stop 4/1/20 at 21:59;  Status DC


Alteplase, Recombinant (Cathflo For Central Catheter Clearance) 1 mg 1X  ONCE 

INT CAT  Last administered on 3/31/20at 20:03;  Start 3/31/20 at 19:30;  Stop 

3/31/20 at 19:46;  Status DC


Alteplase, Recombinant (Cathflo For Central Catheter Clearance) 1 mg 1X  ONCE 

INT CAT  Last administered on 3/31/20at 22:05;  Start 3/31/20 at 22:00;  Stop 

3/31/20 at 22:01;  Status DC


Sodium Chloride 90 meq/Potassium Chloride 15 meq/ Potassium Phosphate 18 mmol/ 

Magnesium Sulfate 8 meq/Calcium Gluconate 15 meq/ Multivitamins 10 ml/Chromium/ 

Copper/Manganese/ Seleni/Zn 0.5 ml/ Insulin Human Regular 20 unit/ Total 

Parenteral Nutrition/Amino Acids/Dextrose/ Fat Emulsion Intravenous 1,400 ml @  

58.333 mls/ hr TPN  CONT IV  Last administered on 4/1/20at 21:30;  Start 4/1/20 

at 22:00;  Stop 4/2/20 at 21:59;  Status DC


Dexmedetomidine HCl 400 mcg/ Sodium Chloride 100 ml @ 0 mls/hr CONT  PRN IV 

ANXIETY / AGITATION Last administered on 5/4/20at 05:52;  Start 4/2/20 at 08:15


Sodium Chloride 500 ml @  500 mls/hr 1X PRN  PRN IV ELEVATED BP, SEE COMMENTS;  

Start 4/2/20 at 08:15


Atropine Sulfate (ATROPINE 0.5mg SYRINGE) 0.5 mg PRN Q5MIN  PRN IV SEE COMMENTS;

 Start 4/2/20 at 08:15


Furosemide (Lasix) 20 mg 1X  ONCE IVP  Last administered on 4/2/20at 08:19;  

Start 4/2/20 at 08:15;  Stop 4/2/20 at 08:16;  Status DC


Lidocaine HCl (Buffered Lidocaine 1%) 3 ml STK-MED ONCE .ROUTE ;  Start 4/2/20 

at 08:39;  Stop 4/2/20 at 08:39;  Status DC


Lidocaine HCl (Buffered Lidocaine 1%) 6 ml 1X  ONCE INJ  Last administered on 

4/2/20at 09:05;  Start 4/2/20 at 09:00;  Stop 4/2/20 at 09:06;  Status DC


Sodium Chloride 90 meq/Potassium Chloride 15 meq/ Potassium Phosphate 18 mmol/ 

Magnesium Sulfate 8 meq/Calcium Gluconate 15 meq/ Multivitamins 10 ml/Chromium/ 

Copper/Manganese/ Seleni/Zn 0.5 ml/ Insulin Human Regular 20 unit/ Total 

Parenteral Nutrition/Amino Acids/Dextrose/ Fat Emulsion Intravenous 1,400 ml @  

58.333 mls/ hr TPN  CONT IV  Last administered on 4/2/20at 22:45;  Start 4/2/20 

at 22:00;  Stop 4/3/20 at 21:59;  Status DC


Sodium Chloride 1,000 ml @  1,000 mls/hr Q1H PRN IV hypotension;  Start 4/3/20 

at 07:30;  Stop 4/3/20 at 13:29;  Status DC


Albumin Human 200 ml @  200 mls/hr 1X PRN  PRN IV Hypotension Last administered 

on 4/3/20at 09:36;  Start 4/3/20 at 07:30;  Stop 4/3/20 at 13:29;  Status DC


Sodium Chloride (Normal Saline Flush) 10 ml 1X PRN  PRN IV AP catheter pack;  

Start 4/3/20 at 07:30;  Stop 4/3/20 at 21:29;  Status DC


Sodium Chloride (Normal Saline Flush) 10 ml 1X PRN  PRN IV  catheter pack;  

Start 4/3/20 at 07:30;  Stop 4/4/20 at 07:29;  Status DC


Sodium Chloride 1,000 ml @  400 mls/hr Q2H30M PRN IV PATENCY;  Start 4/3/20 at 

07:30;  Stop 4/3/20 at 19:29;  Status DC


Info (PHARMACY MONITORING -- do not chart) 1 each PRN DAILY  PRN MC SEE 

COMMENTS;  Start 4/3/20 at 07:30;  Stop 4/3/20 at 13:02;  Status DC


Info (PHARMACY MONITORING -- do not chart) 1 each PRN DAILY  PRN MC SEE COMME

NTS;  Start 4/3/20 at 07:30;  Stop 4/5/20 at 12:45;  Status DC


Sodium Chloride 90 meq/Potassium Chloride 15 meq/ Potassium Phosphate 10 mmol/ 

Magnesium Sulfate 8 meq/Calcium Gluconate 15 meq/ Multivitamins 10 ml/Chromium/ 

Copper/Manganese/ Seleni/Zn 0.5 ml/ Insulin Human Regular 25 unit/ Total 

Parenteral Nutrition/Amino Acids/Dextrose/ Fat Emulsion Intravenous 1,400 ml @  

58.333 mls/ hr TPN  CONT IV  Last administered on 4/3/20at 22:19;  Start 4/3/20 

at 22:00;  Stop 4/4/20 at 21:59;  Status DC


Heparin Sodium (Porcine) (Heparin Sodium) 5,000 unit Q12HR SQ  Last administered

on 4/26/20at 08:59;  Start 4/3/20 at 21:00;  Stop 4/26/20 at 10:05;  Status DC


Ondansetron HCl (Zofran) 4 mg PRN Q6HRS  PRN IV NAUSEA/VOMITING;  Start 4/6/20 

at 07:00;  Stop 4/7/20 at 06:59;  Status DC


Fentanyl Citrate (Fentanyl 2ml Vial) 25 mcg PRN Q5MIN  PRN IV MILD PAIN 1-3;  

Start 4/6/20 at 07:00;  Stop 4/7/20 at 06:59;  Status DC


Fentanyl Citrate (Fentanyl 2ml Vial) 50 mcg PRN Q5MIN  PRN IV MODERATE TO SEVERE

PAIN;  Start 4/6/20 at 07:00;  Stop 4/7/20 at 06:59;  Status DC


Ringer's Solution 1,000 ml @  30 mls/hr Q24H IV ;  Start 4/6/20 at 07:00;  Stop 

4/6/20 at 18:59;  Status DC


Lidocaine HCl (Xylocaine-Mpf 1% 2ml Vial) 2 ml PRN 1X  PRN ID PRIOR TO IV START;

 Start 4/6/20 at 07:00;  Stop 4/7/20 at 06:59;  Status DC


Prochlorperazine Edisylate (Compazine) 5 mg PACU PRN  PRN IV NAUSEA, MRX1;  

Start 4/6/20 at 07:00;  Stop 4/7/20 at 06:59;  Status DC


Sodium Chloride 1,000 ml @  1,000 mls/hr Q1H PRN IV hypotension;  Start 4/4/20 

at 09:10;  Stop 4/4/20 at 15:09;  Status DC


Albumin Human 200 ml @  200 mls/hr 1X PRN  PRN IV Hypotension Last administered 

on 4/4/20at 10:10;  Start 4/4/20 at 09:15;  Stop 4/4/20 at 15:14;  Status DC


Sodium Chloride 1,000 ml @  400 mls/hr Q2H30M PRN IV PATENCY;  Start 4/4/20 at 

09:10;  Stop 4/4/20 at 21:09;  Status DC


Info (PHARMACY MONITORING -- do not chart) 1 each PRN DAILY  PRN MC SEE MIKEY

TS;  Start 4/4/20 at 09:15;  Stop 4/5/20 at 12:45;  Status DC


Info (PHARMACY MONITORING -- do not chart) 1 each PRN DAILY  PRN MC SEE 

COMMENTS;  Start 4/4/20 at 09:15;  Stop 4/5/20 at 12:45;  Status DC


Sodium Chloride 90 meq/Potassium Chloride 15 meq/ Potassium Phosphate 10 mmol/ 

Magnesium Sulfate 8 meq/Calcium Gluconate 15 meq/ Multivitamins 10 ml/Chromium/ 

Copper/Manganese/ Seleni/Zn 0.5 ml/ Insulin Human Regular 25 unit/ Total 

Parenteral Nutrition/Amino Acids/Dextrose/ Fat Emulsion Intravenous 1,400 ml @  

58.333 mls/ hr TPN  CONT IV  Last administered on 4/4/20at 22:10;  Start 4/4/20 

at 22:00;  Stop 4/5/20 at 21:59;  Status DC


Magnesium Sulfate 50 ml @ 25 mls/hr PRN DAILY  PRN IV for Mag < 1.7 on am labs 

Last administered on 4/20/20at 17:27;  Start 4/5/20 at 09:15


Sodium Chloride 90 meq/Potassium Chloride 15 meq/ Potassium Phosphate 10 mmol/ 

Magnesium Sulfate 8 meq/Calcium Gluconate 15 meq/ Multivitamins 10 ml/Chromium/ 

Copper/Manganese/ Seleni/Zn 0.5 ml/ Insulin Human Regular 25 unit/ Total 

Parenteral Nutrition/Amino Acids/Dextrose/ Fat Emulsion Intravenous 1,400 ml @  

58.333 mls/ hr TPN  CONT IV  Last administered on 4/5/20at 21:20;  Start 4/5/20 

at 22:00;  Stop 4/6/20 at 21:59;  Status DC


Sodium Chloride 1,000 ml @  1,000 mls/hr Q1H PRN IV hypotension;  Start 4/5/20 

at 12:23;  Stop 4/5/20 at 18:22;  Status DC


Albumin Human 200 ml @  200 mls/hr 1X  ONCE IV  Last administered on 4/5/20at 

13:34;  Start 4/5/20 at 12:30;  Stop 4/5/20 at 13:29;  Status DC


Diphenhydramine HCl (Benadryl) 25 mg 1X PRN  PRN IV ITCHING;  Start 4/5/20 at 

12:30;  Stop 4/6/20 at 12:29;  Status DC


Diphenhydramine HCl (Benadryl) 25 mg 1X PRN  PRN IV ITCHING;  Start 4/5/20 at 

12:30;  Stop 4/6/20 at 12:29;  Status DC


Info (PHARMACY MONITORING -- do not chart) 1 each PRN DAILY  PRN MC SEE CO

MMENTS;  Start 4/5/20 at 12:30;  Status Cancel


Bupivacaine HCl/ Epinephrine Bitart (Sensorcain-Epi 0.5%-1:364713 Mpf) 30 ml 

STK-MED ONCE .ROUTE  Last administered on 4/6/20at 11:44;  Start 4/6/20 at 

11:00;  Stop 4/6/20 at 11:01;  Status DC


Cellulose (Surgicel Fibrillar 1x2) 1 each STK-MED ONCE .ROUTE ;  Start 4/6/20 at

11:00;  Stop 4/6/20 at 11:01;  Status DC


Sodium Chloride 90 meq/Potassium Chloride 15 meq/ Potassium Phosphate 10 mmol/ 

Magnesium Sulfate 12 meq/Calcium Gluconate 15 meq/ Multivitamins 10 ml/Chromium/

Copper/Manganese/ Seleni/Zn 0.5 ml/ Insulin Human Regular 25 unit/ Total 

Parenteral Nutrition/Amino Acids/Dextrose/ Fat Emulsion Intravenous 1,400 ml @  

58.333 mls/ hr TPN  CONT IV  Last administered on 4/6/20at 22:24;  Start 4/6/20 

at 22:00;  Stop 4/7/20 at 21:59;  Status DC


Propofol 20 ml @ As Directed STK-MED ONCE IV ;  Start 4/6/20 at 11:07;  Stop 

4/6/20 at 11:07;  Status DC


Cellulose (Surgicel Hemostat 4x8) 1 each STK-MED ONCE .ROUTE  Last administered 

on 4/6/20at 11:44;  Start 4/6/20 at 11:55;  Stop 4/6/20 at 11:56;  Status DC


Sevoflurane (Ultane) 60 ml STK-MED ONCE IH ;  Start 4/6/20 at 12:46;  Stop 

4/6/20 at 12:46;  Status DC


Sodium Chloride 1,000 ml @  1,000 mls/hr Q1H PRN IV hypotension;  Start 4/6/20 

at 13:51;  Stop 4/6/20 at 19:50;  Status DC


Albumin Human 200 ml @  200 mls/hr 1X PRN  PRN IV Hypotension Last administered 

on 4/6/20at 14:51;  Start 4/6/20 at 14:00;  Stop 4/6/20 at 19:59;  Status DC


Diphenhydramine HCl (Benadryl) 25 mg 1X PRN  PRN IV ITCHING;  Start 4/6/20 at 

14:00;  Stop 4/7/20 at 13:59;  Status DC


Diphenhydramine HCl (Benadryl) 25 mg 1X PRN  PRN IV ITCHING;  Start 4/6/20 at 

14:00;  Stop 4/7/20 at 13:59;  Status DC


Sodium Chloride 1,000 ml @  400 mls/hr Q2H30M PRN IV PATENCY;  Start 4/6/20 at 

13:51;  Stop 4/7/20 at 01:50;  Status DC


Info (PHARMACY MONITORING -- do not chart) 1 each PRN DAILY  PRN MC SEE 

COMMENTS;  Start 4/6/20 at 14:00;  Stop 4/9/20 at 08:16;  Status DC


Heparin Sodium (Porcine) (Hep Lock Adult) 500 unit STK-MED ONCE IVP ;  Start 

4/7/20 at 09:29;  Stop 4/7/20 at 09:30;  Status DC


Sodium Chloride 1,000 ml @  1,000 mls/hr Q1H PRN IV hypotension;  Start 4/7/20 

at 10:43;  Stop 4/7/20 at 16:42;  Status DC


Sodium Chloride 1,000 ml @  400 mls/hr Q2H30M PRN IV PATENCY;  Start 4/7/20 at 

10:43;  Stop 4/7/20 at 22:42;  Status DC


Info (PHARMACY MONITORING -- do not chart) 1 each PRN DAILY  PRN MC SEE 

COMMENTS;  Start 4/7/20 at 10:45;  Status UNV


Info (PHARMACY MONITORING -- do not chart) 1 each PRN DAILY  PRN MC SEE 

COMMENTS;  Start 4/7/20 at 10:45;  Status UNV


Sodium Chloride 90 meq/Potassium Chloride 15 meq/ Magnesium Sulfate 12 

meq/Calcium Gluconate 15 meq/ Multivitamins 10 ml/Chromium/ Copper/Manganese/ 

Seleni/Zn 0.5 ml/ Insulin Human Regular 25 unit/ Total Parenteral 

Nutrition/Amino Acids/Dextrose/ Fat Emulsion Intravenous 1,400 ml @  58.333 mls/

hr TPN  CONT IV  Last administered on 4/7/20at 22:13;  Start 4/7/20 at 22:00;  

Stop 4/8/20 at 21:59;  Status DC


Sodium Chloride 1,000 ml @  1,000 mls/hr Q1H PRN IV hypotension;  Start 4/8/20 

at 07:50;  Stop 4/8/20 at 13:49;  Status DC


Albumin Human 200 ml @  200 mls/hr 1X  ONCE IV ;  Start 4/8/20 at 08:00;  Stop 

4/8/20 at 08:53;  Status DC


Diphenhydramine HCl (Benadryl) 25 mg 1X PRN  PRN IV ITCHING;  Start 4/8/20 at 

08:00;  Stop 4/9/20 at 07:59;  Status DC


Diphenhydramine HCl (Benadryl) 25 mg 1X PRN  PRN IV ITCHING;  Start 4/8/20 at 

08:00;  Stop 4/9/20 at 07:59;  Status DC


Info (PHARMACY MONITORING -- do not chart) 1 each PRN DAILY  PRN MC SEE 

COMMENTS;  Start 4/8/20 at 08:00;  Stop 4/9/20 at 08:16;  Status DC


Albumin Human 50 ml @ 50 mls/hr 1X  ONCE IV ;  Start 4/8/20 at 08:53;  Stop 

4/8/20 at 08:56;  Status DC


Albumin Human 200 ml @  50 mls/hr PRN 1X  PRN IV HYPOTENSION Last administered 

on 4/14/20at 11:54;  Start 4/8/20 at 09:00


Meropenem 500 mg/ Sodium Chloride 50 ml @  100 mls/hr Q12H IV  Last administered

on 4/28/20at 10:45;  Start 4/8/20 at 10:00;  Stop 4/28/20 at 12:37;  Status DC


Sodium Chloride 90 meq/Magnesium Sulfate 12 meq/ Calcium Gluconate 15 meq/ 

Multivitamins 10 ml/Chromium/ Copper/Manganese/ Seleni/Zn 0.5 ml/ Insulin Human 

Regular 25 unit/ Total Parenteral Nutrition/Amino Acids/Dextrose/ Fat Emulsion 

Intravenous 1,400 ml @  58.333 mls/ hr TPN  CONT IV  Last administered on 

4/8/20at 21:41;  Start 4/8/20 at 22:00;  Stop 4/9/20 at 21:59;  Status DC


Sodium Chloride 1,000 ml @  1,000 mls/hr Q1H PRN IV hypotension;  Start 4/9/20 

at 07:58;  Stop 4/9/20 at 13:57;  Status DC


Albumin Human 200 ml @  200 mls/hr 1X PRN  PRN IV Hypotension Last administered 

on 4/9/20at 09:30;  Start 4/9/20 at 08:00;  Stop 4/9/20 at 13:59;  Status DC


Sodium Chloride 1,000 ml @  400 mls/hr Q2H30M PRN IV PATENCY;  Start 4/9/20 at 

07:58;  Stop 4/9/20 at 19:57;  Status DC


Info (PHARMACY MONITORING -- do not chart) 1 each PRN DAILY  PRN MC SEE 

COMMENTS;  Start 4/9/20 at 08:00;  Status Cancel


Info (PHARMACY MONITORING -- do not chart) 1 each PRN DAILY  PRN MC SEE 

COMMENTS;  Start 4/9/20 at 08:15;  Status UNV


Sodium Chloride 90 meq/Potassium Phosphate 5 mmol/ Magnesium Sulfate 12 

meq/Calcium Gluconate 15 meq/ Multivitamins 10 ml/Chromium/ Copper/Manganese/ 

Seleni/Zn 0.5 ml/ Insulin Human Regular 30 unit/ Total Parenteral 

Nutrition/Amino Acids/Dextrose/ Fat Emulsion Intravenous 1,400 ml @  58.333 mls/

hr TPN  CONT IV  Last administered on 4/9/20at 22:08;  Start 4/9/20 at 22:00;  

Stop 4/10/20 at 21:59;  Status DC


Linezolid/Dextrose 300 ml @  300 mls/hr Q12HR IV  Last administered on 4/20/20at

20:40;  Start 4/10/20 at 11:00;  Stop 4/21/20 at 08:10;  Status DC


Sodium Chloride 90 meq/Potassium Phosphate 15 mmol/ Magnesium Sulfate 12 

meq/Calcium Gluconate 15 meq/ Multivitamins 10 ml/Chromium/ Copper/Manganese/ 

Seleni/Zn 0.5 ml/ Insulin Human Regular 30 unit/ Total Parenteral Nutrition/Ami

no Acids/Dextrose/ Fat Emulsion Intravenous 1,400 ml @  58.333 mls/ hr TPN  CONT

IV  Last administered on 4/10/20at 21:49;  Start 4/10/20 at 22:00;  Stop 4/11/20

at 21:59;  Status DC


Sodium Chloride 90 meq/Potassium Phosphate 15 mmol/ Magnesium Sulfate 12 

meq/Calcium Gluconate 15 meq/ Multivitamins 10 ml/Chromium/ Copper/Manganese/ 

Seleni/Zn 0.5 ml/ Insulin Human Regular 40 unit/ Total Parenteral 

Nutrition/Amino Acids/Dextrose/ Fat Emulsion Intravenous 1,400 ml @  58.333 mls/

hr TPN  CONT IV  Last administered on 4/11/20at 21:21;  Start 4/11/20 at 22:00; 

Stop 4/12/20 at 21:59;  Status DC


Sodium Chloride 1,000 ml @  1,000 mls/hr Q1H PRN IV hypotension;  Start 4/11/20 

at 13:26;  Stop 4/11/20 at 19:25;  Status DC


Albumin Human 200 ml @  200 mls/hr 1X PRN  PRN IV Hypotension Last administered 

on 4/11/20at 15:00;  Start 4/11/20 at 13:30;  Stop 4/11/20 at 19:29;  Status DC


Sodium Chloride (Normal Saline Flush) 10 ml 1X PRN  PRN IV AP catheter pack;  

Start 4/11/20 at 13:30;  Stop 4/12/20 at 13:29;  Status DC


Sodium Chloride (Normal Saline Flush) 10 ml 1X PRN  PRN IV  catheter pack;  

Start 4/11/20 at 13:30;  Stop 4/12/20 at 13:29;  Status DC


Sodium Chloride 1,000 ml @  400 mls/hr Q2H30M PRN IV PATENCY;  Start 4/11/20 at 

13:26;  Stop 4/12/20 at 01:25;  Status DC


Info (PHARMACY MONITORING -- do not chart) 1 each PRN DAILY  PRN MC SEE 

COMMENTS;  Start 4/11/20 at 13:30;  Stop 4/11/20 at 13:33;  Status DC


Info (PHARMACY MONITORING -- do not chart) 1 each PRN DAILY  PRN MC SEE 

COMMENTS;  Start 4/11/20 at 13:30;  Stop 4/11/20 at 13:34;  Status DC


Sodium Chloride 90 meq/Potassium Phosphate 19 mmol/ Magnesium Sulfate 12 

meq/Calcium Gluconate 15 meq/ Multivitamins 10 ml/Chromium/ Copper/Manganese/ 

Seleni/Zn 0.5 ml/ Insulin Human Regular 40 unit/ Total Parenteral Nutrition/Amin

o Acids/Dextrose/ Fat Emulsion Intravenous 1,400 ml @  58.333 mls/ hr TPN  CONT 

IV  Last administered on 4/12/20at 21:54;  Start 4/12/20 at 22:00;  Stop 4/13/20

at 21:59;  Status DC


Sodium Chloride 1,000 ml @  1,000 mls/hr Q1H PRN IV hypotension;  Start 4/13/20 

at 09:35;  Stop 4/13/20 at 15:34;  Status DC


Albumin Human 200 ml @  200 mls/hr 1X PRN  PRN IV Hypotension;  Start 4/13/20 at

09:45;  Stop 4/13/20 at 15:44;  Status DC


Diphenhydramine HCl (Benadryl) 25 mg 1X PRN  PRN IV ITCHING;  Start 4/13/20 at 

09:45;  Stop 4/14/20 at 09:44;  Status DC


Diphenhydramine HCl (Benadryl) 25 mg 1X PRN  PRN IV ITCHING;  Start 4/13/20 at 

09:45;  Stop 4/14/20 at 09:44;  Status DC


Sodium Chloride 1,000 ml @  400 mls/hr Q2H30M PRN IV PATENCY;  Start 4/13/20 at 

09:35;  Stop 4/13/20 at 21:34;  Status DC


Info (PHARMACY MONITORING -- do not chart) 1 each PRN DAILY  PRN MC SEE 

COMMENTS;  Start 4/13/20 at 09:45;  Status Cancel


Sodium Chloride 100 meq/Potassium Phosphate 19 mmol/ Magnesium Sulfate 12 

meq/Calcium Gluconate 15 meq/ Multivitamins 10 ml/Chromium/ Copper/Manganese/ 

Seleni/Zn 0.5 ml/ Insulin Human Regular 40 unit/ Potassium Chloride 20 meq/ 

Total Parenteral Nutrition/Amino Acids/Dextrose/ Fat Emulsion Intravenous 1,400 

ml @  58.333 mls/ hr TPN  CONT IV  Last administered on 4/13/20at 22:02;  Start 

4/13/20 at 22:00;  Stop 4/14/20 at 21:59;  Status DC


Furosemide (Lasix) 40 mg 1X  ONCE IVP  Last administered on 4/13/20at 14:39;  

Start 4/13/20 at 14:30;  Stop 4/13/20 at 14:31;  Status DC


Metronidazole 100 ml @  100 mls/hr Q8HRS IV  Last administered on 4/21/20at 

06:04;  Start 4/14/20 at 10:00;  Stop 4/21/20 at 08:10;  Status DC


Sodium Chloride 1,000 ml @  1,000 mls/hr Q1H PRN IV hypotension;  Start 4/14/20 

at 08:00;  Stop 4/14/20 at 13:59;  Status DC


Albumin Human 200 ml @  200 mls/hr 1X PRN  PRN IV Hypotension;  Start 4/14/20 at

08:00;  Stop 4/14/20 at 13:59;  Status DC


Sodium Chloride 1,000 ml @  400 mls/hr Q2H30M PRN IV PATENCY;  Start 4/14/20 at 

08:00;  Stop 4/14/20 at 19:59;  Status DC


Info (PHARMACY MONITORING -- do not chart) 1 each PRN DAILY  PRN MC SEE 

COMMENTS;  Start 4/14/20 at 11:30;  Status UNV


Info (PHARMACY MONITORING -- do not chart) 1 each PRN DAILY  PRN MC SEE 

COMMENTS;  Start 4/14/20 at 11:30;  Stop 4/16/20 at 12:13;  Status DC


Sodium Chloride 100 meq/Potassium Phosphate 19 mmol/ Magnesium Sulfate 12 

meq/Calcium Gluconate 15 meq/ Multivitamins 10 ml/Chromium/ Copper/Manganese/ 

Seleni/Zn 0.5 ml/ Insulin Human Regular 40 unit/ Potassium Chloride 20 meq/ 

Total Parenteral Nutrition/Amino Acids/Dextrose/ Fat Emulsion Intravenous 1,400 

ml @  58.333 mls/ hr TPN  CONT IV  Last administered on 4/14/20at 21:52;  Start 

4/14/20 at 22:00;  Stop 4/15/20 at 21:59;  Status DC


Sodium Chloride (Normal Saline Flush) 10 ml QSHIFT  PRN IV AFTER MEDS AND BLOOD 

DRAWS;  Start 4/14/20 at 15:00


Sodium Chloride (Normal Saline Flush) 10 ml PRN Q5MIN  PRN IV AFTER MEDS AND 

BLOOD DRAWS;  Start 4/14/20 at 15:00


Sodium Chloride (Normal Saline Flush) 20 ml PRN Q5MIN  PRN IV AFTER MEDS AND 

BLOOD DRAWS;  Start 4/14/20 at 15:00


Sodium Chloride 100 meq/Potassium Phosphate 19 mmol/ Magnesium Sulfate 12 

meq/Calcium Gluconate 15 meq/ Multivitamins 10 ml/Chromium/ Copper/Manganese/ 

Seleni/Zn 0.5 ml/ Insulin Human Regular 40 unit/ Potassium Chloride 20 meq/ 

Total Parenteral Nutrition/Amino Acids/Dextrose/ Fat Emulsion Intravenous 1,400 

ml @  58.333 mls/ hr TPN  CONT IV  Last administered on 4/15/20at 21:20;  Start 

4/15/20 at 22:00;  Stop 4/16/20 at 21:59;  Status DC


Lidocaine HCl (Buffered Lidocaine 1%) 3 ml STK-MED ONCE .ROUTE ;  Start 4/15/20 

at 13:16;  Stop 4/15/20 at 13:16;  Status DC


Lidocaine HCl (Buffered Lidocaine 1%) 6 ml 1X  ONCE INJ  Last administered on 

4/15/20at 13:45;  Start 4/15/20 at 13:30;  Stop 4/15/20 at 13:31;  Status DC


Albumin Human 100 ml @  100 mls/hr 1X  ONCE IV  Last administered on 4/15/20at 

15:41;  Start 4/15/20 at 15:00;  Stop 4/15/20 at 15:59;  Status DC


Albumin Human 50 ml @ 50 mls/hr 1X  ONCE IV  Last administered on 4/15/20at 

15:00;  Start 4/15/20 at 15:00;  Stop 4/15/20 at 15:59;  Status DC


Info (PHARMACY MONITORING -- do not chart) 1 each PRN DAILY  PRN MC SEE 

COMMENTS;  Start 4/16/20 at 11:30;  Status Cancel


Info (PHARMACY MONITORING -- do not chart) 1 each PRN DAILY  PRN MC SEE 

COMMENTS;  Start 4/16/20 at 11:30;  Status UNV


Sodium Chloride 100 meq/Potassium Phosphate 10 mmol/ Magnesium Sulfate 12 

meq/Calcium Gluconate 15 meq/ Multivitamins 10 ml/Chromium/ Copper/Manganese/ 

Seleni/Zn 0.5 ml/ Insulin Human Regular 35 unit/ Potassium Chloride 20 meq/ 

Total Parenteral Nutrition/Amino Acids/Dextrose/ Fat Emulsion Intravenous 1,400 

ml @  58.333 mls/ hr TPN  CONT IV  Last administered on 4/16/20at 22:10;  Start 

4/16/20 at 22:00;  Stop 4/17/20 at 21:59;  Status DC


Sodium Chloride 100 meq/Potassium Phosphate 5 mmol/ Magnesium Sulfate 12 

meq/Calcium Gluconate 15 meq/ Multivitamins 10 ml/Chromium/ Copper/Manganese/ 

Seleni/Zn 0.5 ml/ Insulin Human Regular 35 unit/ Potassium Chloride 20 meq/ 

Total Parenteral Nutrition/Amino Acids/Dextrose/ Fat Emulsion Intravenous 1,400 

ml @  58.333 mls/ hr TPN  CONT IV  Last administered on 4/17/20at 22:59;  Start 

4/17/20 at 22:00;  Stop 4/18/20 at 21:59;  Status DC


Sodium Chloride 1,000 ml @  1,000 mls/hr Q1H PRN IV hypotension;  Start 4/18/20 

at 08:27;  Stop 4/18/20 at 14:26;  Status DC


Albumin Human 200 ml @  200 mls/hr 1X PRN  PRN IV Hypotension Last administered 

on 4/18/20at 09:18;  Start 4/18/20 at 08:30;  Stop 4/18/20 at 14:29;  Status DC


Sodium Chloride 1,000 ml @  400 mls/hr Q2H30M PRN IV PATENCY;  Start 4/18/20 at 

08:27;  Stop 4/18/20 at 20:26;  Status DC


Info (PHARMACY MONITORING -- do not chart) 1 each PRN DAILY  PRN MC SEE 

COMMENTS;  Start 4/18/20 at 08:30;  Status Cancel


Info (PHARMACY MONITORING -- do not chart) 1 each PRN DAILY  PRN MC SEE 

COMMENTS;  Start 4/18/20 at 08:30;  Stop 4/26/20 at 13:10;  Status DC


Sodium Chloride 100 meq/Potassium Chloride 40 meq/ Magnesium Sulfate 15 

meq/Calcium Gluconate 15 meq/ Multivitamins 10 ml/Chromium/ Copper/Manganese/ 

Seleni/Zn 0.5 ml/ Insulin Human Regular 35 unit/ Total Parenteral 

Nutrition/Amino Acids/Dextrose/ Fat Emulsion Intravenous 1,400 ml @  58.333 mls/

hr TPN  CONT IV  Last administered on 4/18/20at 22:00;  Start 4/18/20 at 22:00; 

Stop 4/19/20 at 21:59;  Status DC


Potassium Chloride/Water 100 ml @  100 mls/hr 1X  ONCE IV  Last administered on 

4/18/20at 17:28;  Start 4/18/20 at 14:45;  Stop 4/18/20 at 15:44;  Status DC


Sodium Chloride 100 meq/Potassium Chloride 40 meq/ Magnesium Sulfate 15 

meq/Calcium Gluconate 15 meq/ Multivitamins 10 ml/Chromium/ Copper/Manganese/ 

Seleni/Zn 0.5 ml/ Insulin Human Regular 35 unit/ Total Parenteral 

Nutrition/Amino Acids/Dextrose/ Fat Emulsion Intravenous 1,400 ml @  58.333 mls/

hr TPN  CONT IV  Last administered on 4/19/20at 22:46;  Start 4/19/20 at 22:00; 

Stop 4/20/20 at 21:59;  Status DC


Sodium Chloride 100 meq/Potassium Chloride 40 meq/ Magnesium Sulfate 20 meq/Calc

ium Gluconate 15 meq/ Multivitamins 10 ml/Chromium/ Copper/Manganese/ Seleni/Zn 

0.5 ml/ Insulin Human Regular 35 unit/ Total Parenteral Nutrition/Amino 

Acids/Dextrose/ Fat Emulsion Intravenous 1,400 ml @  58.333 mls/ hr TPN  CONT IV

 Last administered on 4/20/20at 22:31;  Start 4/20/20 at 22:00;  Stop 4/21/20 at

21:59;  Status DC


Fentanyl Citrate (Fentanyl 2ml Vial) 50 mcg PRN Q2HR  PRN IVP PAIN Last 

administered on 4/27/20at 13:32;  Start 4/20/20 at 21:00;  Stop 4/28/20 at 

12:53;  Status DC


Fentanyl Citrate (Fentanyl 2ml Vial) 25 mcg PRN Q2HR  PRN IVP PAIN;  Start 

4/20/20 at 21:00;  Stop 4/28/20 at 12:54;  Status DC


Enoxaparin Sodium (Lovenox 100mg Syringe) 100 mg Q12HR SQ ;  Start 4/21/20 at 

21:00;  Status UNV


Amino Acids/ Glycerin/ Electrolytes 1,000 ml @  75 mls/hr [id number] IV ;  Start 

4/20/20 at 21:15;  Status UNV


Sodium Chloride 1,000 ml @  1,000 mls/hr Q1H PRN IV hypotension;  Start 4/21/20 

at 07:56;  Stop 4/21/20 at 13:55;  Status DC


Albumin Human 200 ml @  200 mls/hr 1X PRN  PRN IV Hypotension Last administered 

on 4/21/20at 08:40;  Start 4/21/20 at 08:00;  Stop 4/21/20 at 13:59;  Status DC


Sodium Chloride 1,000 ml @  400 mls/hr Q2H30M PRN IV PATENCY;  Start 4/21/20 at 

07:56;  Stop 4/21/20 at 19:55;  Status DC


Info (PHARMACY MONITORING -- do not chart) 1 each PRN DAILY  PRN MC SEE 

COMMENTS;  Start 4/21/20 at 08:00;  Status UNV


Info (PHARMACY MONITORING -- do not chart) 1 each PRN DAILY  PRN MC SEE 

COMMENTS;  Start 4/21/20 at 08:00;  Status UNV


Daptomycin 430 mg/ Sodium Chloride 50 ml @  100 mls/hr Q24H IV  Last 

administered on 4/21/20at 12:35;  Start 4/21/20 at 09:00;  Stop 4/21/20 at 

12:49;  Status DC


Sodium Chloride 100 meq/Potassium Chloride 40 meq/ Magnesium Sulfate 20 

meq/Calcium Gluconate 15 meq/ Multivitamins 10 ml/Chromium/ Copper/Manganese/ 

Seleni/Zn 0.5 ml/ Insulin Human Regular 35 unit/ Total Parenteral 

Nutrition/Amino Acids/Dextrose/ Fat Emulsion Intravenous 1,400 ml @  58.333 mls/

hr TPN  CONT IV  Last administered on 4/21/20at 21:26;  Start 4/21/20 at 22:00; 

Stop 4/22/20 at 21:59;  Status DC


Daptomycin 430 mg/ Sodium Chloride 50 ml @  100 mls/hr Q48H IV ;  Start 4/23/20 

at 09:00;  Stop 4/22/20 at 11:55;  Status DC


Sodium Chloride 100 meq/Potassium Chloride 40 meq/ Magnesium Sulfate 20 

meq/Calcium Gluconate 15 meq/ Multivitamins 10 ml/Chromium/ Copper/Manganese/ 

Seleni/Zn 0.5 ml/ Insulin Human Regular 35 unit/ Total Parenteral 

Nutrition/Amino Acids/Dextrose/ Fat Emulsion Intravenous 1,400 ml @  58.333 mls/

hr TPN  CONT IV  Last administered on 4/22/20at 22:27;  Start 4/22/20 at 22:00; 

Stop 4/23/20 at 21:59;  Status DC


Daptomycin 430 mg/ Sodium Chloride 50 ml @  100 mls/hr Q24H IV  Last 

administered on 4/24/20at 15:07;  Start 4/22/20 at 13:00;  Stop 4/25/20 at 

13:15;  Status DC


Sodium Chloride 100 meq/Potassium Chloride 40 meq/ Magnesium Sulfate 20 

meq/Calcium Gluconate 10 meq/ Multivitamins 10 ml/Chromium/ Copper/Manganese/ 

Seleni/Zn 0.5 ml/ Insulin Human Regular 35 unit/ Total Parenteral 

Nutrition/Amino Acids/Dextrose/ Fat Emulsion Intravenous 1,400 ml @  58.333 mls/

hr TPN  CONT IV  Last administered on 4/24/20at 00:06;  Start 4/23/20 at 22:00; 

Stop 4/24/20 at 21:59;  Status DC


Alteplase, Recombinant (Cathflo For Central Catheter Clearance) 1 mg 1X  ONCE 

INT CAT  Last administered on 4/24/20at 11:44;  Start 4/24/20 at 10:45;  Stop 

4/24/20 at 10:46;  Status DC


Ondansetron HCl (Zofran) 4 mg PRN Q6HRS  PRN IV NAUSEA/VOMITING;  Start 4/27/20 

at 07:00;  Stop 4/28/20 at 06:59;  Status DC


Fentanyl Citrate (Fentanyl 2ml Vial) 25 mcg PRN Q5MIN  PRN IV MILD PAIN 1-3;  

Start 4/27/20 at 07:00;  Stop 4/28/20 at 06:59;  Status DC


Fentanyl Citrate (Fentanyl 2ml Vial) 50 mcg PRN Q5MIN  PRN IV MODERATE TO SEVERE

PAIN Last administered on 4/27/20at 10:17;  Start 4/27/20 at 07:00;  Stop 

4/28/20 at 06:59;  Status DC


Ringer's Solution 1,000 ml @  30 mls/hr Q24H IV ;  Start 4/27/20 at 07:00;  Stop

4/27/20 at 18:59;  Status DC


Lidocaine HCl (Xylocaine-Mpf 1% 2ml Vial) 2 ml PRN 1X  PRN ID PRIOR TO IV START;

 Start 4/27/20 at 07:00;  Stop 4/28/20 at 06:59;  Status DC


Prochlorperazine Edisylate (Compazine) 5 mg PACU PRN  PRN IV NAUSEA, MRX1;  

Start 4/27/20 at 07:00;  Stop 4/28/20 at 06:59;  Status DC


Sodium Acetate 50 meq/Potassium Acetate 55 meq/ Magnesium Sulfate 20 meq/Calcium

Gluconate 10 meq/ Multivitamins 10 ml/Chromium/ Copper/Manganese/ Seleni/Zn 0.5 

ml/ Insulin Human Regular 35 unit/ Total Parenteral Nutrition/Amino 

Acids/Dextrose/ Fat Emulsion Intravenous 1,400 ml @  58.333 mls/ hr TPN  CONT IV

;  Start 4/24/20 at 22:00;  Stop 4/24/20 at 14:15;  Status DC


Sodium Acetate 50 meq/Potassium Acetate 55 meq/ Magnesium Sulfate 20 meq/Calcium

Gluconate 10 meq/ Multivitamins 10 ml/Chromium/ Copper/Manganese/ Seleni/Zn 0.5 

ml/ Insulin Human Regular 35 unit/ Total Parenteral Nutrition/Amino 

Acids/Dextrose/ Fat Emulsion Intravenous 1,800 ml @  75 mls/hr TPN  CONT IV  

Last administered on 4/24/20at 22:38;  Start 4/24/20 at 22:00;  Stop 4/25/20 at 

21:59;  Status DC


Sodium Chloride 1,000 ml @  1,000 mls/hr Q1H PRN IV hypotension;  Start 4/24/20 

at 15:31;  Stop 4/24/20 at 21:30;  Status DC


Diphenhydramine HCl (Benadryl) 25 mg 1X PRN  PRN IV ITCHING;  Start 4/24/20 at 

15:45;  Stop 4/25/20 at 15:44;  Status DC


Diphenhydramine HCl (Benadryl) 25 mg 1X PRN  PRN IV ITCHING;  Start 4/24/20 at 

15:45;  Stop 4/25/20 at 15:44;  Status DC


Sodium Chloride 1,000 ml @  400 mls/hr Q2H30M PRN IV PATENCY;  Start 4/24/20 at 

15:31;  Stop 4/25/20 at 03:30;  Status DC


Info (PHARMACY MONITORING -- do not chart) 1 each PRN DAILY  PRN MC SEE 

COMMENTS;  Start 4/24/20 at 15:45


Sodium Acetate 50 meq/Potassium Acetate 55 meq/ Magnesium Sulfate 20 meq/Calcium

Gluconate 10 meq/ Multivitamins 10 ml/Chromium/ Copper/Manganese/ Seleni/Zn 0.5 

ml/ Insulin Human Regular 35 unit/ Total Parenteral Nutrition/Amino 

Acids/Dextrose/ Fat Emulsion Intravenous 1,800 ml @  75 mls/hr TPN  CONT IV  

Last administered on 4/25/20at 22:03;  Start 4/25/20 at 22:00;  Stop 4/26/20 at 

21:59;  Status DC


Daptomycin 430 mg/ Sodium Chloride 50 ml @  100 mls/hr Q24H IV  Last 

administered on 4/30/20at 13:00;  Start 4/25/20 at 13:00;  Stop 4/30/20 at 

20:58;  Status DC


Heparin Sodium (Porcine) 1000 unit/Sodium Chloride 1,001 ml @  1,001 mls/hr 1X  

ONCE IRR ;  Start 4/27/20 at 06:00;  Stop 4/27/20 at 06:59;  Status DC


Potassium Acetate 55 meq/Magnesium Sulfate 20 meq/ Calcium Gluconate 10 meq/ 

Multivitamins 10 ml/Chromium/ Copper/Manganese/ Seleni/Zn 0.5 ml/ Insulin Human 

Regular 35 unit/ Total Parenteral Nutrition/Amino Acids/Dextrose/ Fat Emulsion 

Intravenous 1,920 ml @  80 mls/hr TPN  CONT IV  Last administered on 4/26/20at 

22:10;  Start 4/26/20 at 22:00;  Stop 4/27/20 at 21:59;  Status DC


Dexamethasone Sodium Phosphate (Decadron) 4 mg STK-MED ONCE .ROUTE ;  Start 

4/27/20 at 10:56;  Stop 4/27/20 at 10:57;  Status DC


Ondansetron HCl (Zofran) 4 mg STK-MED ONCE .ROUTE ;  Start 4/27/20 at 10:56;  

Stop 4/27/20 at 10:57;  Status DC


Rocuronium Bromide (Zemuron) 50 mg STK-MED ONCE .ROUTE ;  Start 4/27/20 at 

10:56;  Stop 4/27/20 at 10:57;  Status DC


Fentanyl Citrate (Fentanyl 2ml Vial) 100 mcg STK-MED ONCE .ROUTE ;  Start 

4/27/20 at 10:56;  Stop 4/27/20 at 10:57;  Status DC


Bupivacaine HCl/ Epinephrine Bitart (Sensorcain-Epi 0.5%-1:325105 Mpf) 30 ml 

STK-MED ONCE .ROUTE  Last administered on 4/27/20at 12:01;  Start 4/27/20 at 

10:58;  Stop 4/27/20 at 10:58;  Status DC


Cellulose (Surgicel Hemostat 2x14) 1 each STK-MED ONCE .ROUTE ;  Start 4/27/20 

at 10:58;  Stop 4/27/20 at 10:59;  Status DC


Iohexol (Omnipaque 300 Mg/ml) 50 ml STK-MED ONCE .ROUTE ;  Start 4/27/20 at 

10:58;  Stop 4/27/20 at 10:59;  Status DC


Cellulose (Surgicel Hemostat 4x8) 1 each STK-MED ONCE .ROUTE ;  Start 4/27/20 at

10:58;  Stop 4/27/20 at 10:59;  Status DC


Bisacodyl (Dulcolax Supp) 10 mg STK-MED ONCE .ROUTE ;  Start 4/27/20 at 10:59;  

Stop 4/27/20 at 10:59;  Status DC


Heparin Sodium (Porcine) 1000 unit/Sodium Chloride 1,001 ml @  1,001 mls/hr 1X  

ONCE IRR ;  Start 4/27/20 at 12:00;  Stop 4/27/20 at 12:59;  Status DC


Propofol 20 ml @ As Directed STK-MED ONCE IV ;  Start 4/27/20 at 11:05;  Stop 

4/27/20 at 11:05;  Status DC


Sevoflurane (Ultane) 90 ml STK-MED ONCE IH ;  Start 4/27/20 at 11:05;  Stop 

4/27/20 at 11:05;  Status DC


Sevoflurane (Ultane) 60 ml STK-MED ONCE IH ;  Start 4/27/20 at 12:26;  Stop 

4/27/20 at 12:27;  Status DC


Propofol 20 ml @ As Directed STK-MED ONCE IV ;  Start 4/27/20 at 12:26;  Stop 

4/27/20 at 12:27;  Status DC


Phenylephrine HCl (PHENYLEPHRINE in 0.9% NACL PF) 1 mg STK-MED ONCE IV ;  Start 

4/27/20 at 12:34;  Stop 4/27/20 at 12:34;  Status DC


Heparin Sodium (Porcine) (Heparin Sodium) 5,000 unit Q12HR SQ  Last administered

on 5/3/20at 21:12;  Start 4/27/20 at 21:00


Sodium Chloride (Normal Saline Flush) 3 ml QSHIFT  PRN IV AFTER MEDS AND BLOOD 

DRAWS;  Start 4/27/20 at 13:45


Naloxone HCl (Narcan) 0.4 mg PRN Q2MIN  PRN IV SEE INSTRUCTIONS;  Start 4/27/20 

at 13:45


Sodium Chloride 1,000 ml @  25 mls/hr Q24H IV  Last administered on 5/3/20at 

19:49;  Start 4/27/20 at 13:37


Naloxone HCl (Narcan) 0.4 mg PRN Q2MIN  PRN IV SEE INSTRUCTIONS;  Start 4/27/20 

at 14:30;  Status UNV


Sodium Chloride 1,000 ml @  25 mls/hr Q24H IV ;  Start 4/27/20 at 14:30;  Status

UNV


Hydromorphone HCl 30 ml @ 0 mls/hr CONT PRN  PRN IV PER PROTOCOL Last 

administered on 5/2/20at 16:08;  Start 4/27/20 at 14:30;  Stop 5/4/20 at 08:55; 

Status DC


Potassium Acetate 55 meq/Magnesium Sulfate 20 meq/ Calcium Gluconate 10 meq/ 

Multivitamins 10 ml/Chromium/ Copper/Manganese/ Seleni/Zn 0.5 ml/ Insulin Human 

Regular 35 unit/ Total Parenteral Nutrition/Amino Acids/Dextrose/ Fat Emulsion 

Intravenous 1,920 ml @  80 mls/hr TPN  CONT IV  Last administered on 4/27/20at 

22:01;  Start 4/27/20 at 22:00;  Stop 4/28/20 at 21:59;  Status DC


Bumetanide (Bumex) 2 mg [id number] IV  Last administered on 5/1/20at 13:50;  Start 

4/28/20 at 14:00;  Stop 5/2/20 at 14:10;  Status DC


Meropenem 1 gm/ Sodium Chloride 100 ml @  200 mls/hr Q8HRS IV  Last administered

on 5/4/20at 06:06;  Start 4/28/20 at 14:00


Potassium Acetate 55 meq/Magnesium Sulfate 20 meq/ Calcium Gluconate 10 meq/ 

Multivitamins 10 ml/Chromium/ Copper/Manganese/ Seleni/Zn 0.5 ml/ Insulin Human 

Regular 35 unit/ Total Parenteral Nutrition/Amino Acids/Dextrose/ Fat Emulsion 

Intravenous 1,920 ml @  80 mls/hr TPN  CONT IV  Last administered on 4/28/20at 

22:02;  Start 4/28/20 at 22:00;  Stop 4/29/20 at 21:59;  Status DC


Hydromorphone HCl (Dilaudid Standard PCA) 12 mg STK-MED ONCE IV ;  Start 4/27/20

at 14:35;  Stop 4/28/20 at 13:53;  Status DC


Artificial Tears (Artificial Tears) 1 drop PRN Q15MIN  PRN OU DRY EYE Last 

administered on 5/1/20at 12:34;  Start 4/29/20 at 05:30


Hydromorphone HCl (Dilaudid Standard PCA) 12 mg STK-MED ONCE IV ;  Start 4/28/20

at 12:05;  Stop 4/29/20 at 09:15;  Status DC


Potassium Acetate 65 meq/Magnesium Sulfate 20 meq/ Calcium Gluconate 10 meq/ 

Multivitamins 10 ml/Chromium/ Copper/Manganese/ Seleni/Zn 0.5 ml/ Insulin Human 

Regular 30 unit/ Total Parenteral Nutrition/Amino Acids/Dextrose/ Fat Emulsion 

Intravenous 1,920 ml @  80 mls/hr TPN  CONT IV  Last administered on 4/29/20at 

22:22;  Start 4/29/20 at 22:00;  Stop 4/30/20 at 21:59;  Status DC


Cyclobenzaprine HCl (Flexeril) 10 mg PRN Q6HRS  PRN PO MUSCLE SPASMS;  Start 

4/30/20 at 10:45


Potassium Acetate 55 meq/Magnesium Sulfate 20 meq/ Calcium Gluconate 10 meq/ 

Multivitamins 10 ml/Chromium/ Copper/Manganese/ Seleni/Zn 0.5 ml/ Insulin Human 

Regular 30 unit/ Total Parenteral Nutrition/Amino Acids/Dextrose/ Fat Emulsion 

Intravenous 1,920 ml @  80 mls/hr TPN  CONT IV  Last administered on 5/1/20at 

01:00;  Start 4/30/20 at 22:00;  Stop 5/1/20 at 21:59;  Status DC


Magnesium Sulfate 50 ml @ 25 mls/hr 1X  ONCE IV  Last administered on 4/30/20at 

17:18;  Start 4/30/20 at 12:45;  Stop 4/30/20 at 14:44;  Status DC


Potassium Chloride/Water 100 ml @  100 mls/hr 1X  ONCE IV  Last administered on 

5/1/20at 11:27;  Start 5/1/20 at 12:00;  Stop 5/1/20 at 12:59;  Status DC


Hydromorphone HCl (Dilaudid Standard PCA) 12 mg STK-MED ONCE IV ;  Start 4/29/20

at 10:50;  Stop 5/1/20 at 11:02;  Status DC


Hydromorphone HCl (Dilaudid Standard PCA) 12 mg STK-MED ONCE IV ;  Start 4/30/20

at 13:47;  Stop 5/1/20 at 11:03;  Status DC


Potassium Acetate 30 meq/Magnesium Sulfate 20 meq/ Calcium Gluconate 10 meq/ 

Multivitamins 10 ml/Chromium/ Copper/Manganese/ Seleni/Zn 0.5 ml/ Insulin Human 

Regular 30 unit/ Potassium Chloride 30 meq/ Total Parenteral Nutrition/Amino 

Acids/Dextrose/ Fat Emulsion Intravenous 1,920 ml @  80 mls/hr TPN  CONT IV  Las

t administered on 5/1/20at 22:34;  Start 5/1/20 at 22:00;  Stop 5/2/20 at 21:59;

 Status DC


Potassium Chloride/Water 100 ml @  100 mls/hr Q1H IV  Last administered on 

5/2/20at 13:05;  Start 5/2/20 at 07:00;  Stop 5/2/20 at 10:59;  Status DC


Magnesium Sulfate 50 ml @ 25 mls/hr 1X  ONCE IV  Last administered on 5/2/20at 

10:34;  Start 5/2/20 at 10:30;  Stop 5/2/20 at 12:29;  Status DC


Potassium Chloride 75 meq/ Magnesium Sulfate 20 meq/Calcium Gluconate 10 meq/ 

Multivitamins 10 ml/Chromium/ Copper/Manganese/ Seleni/Zn 0.5 ml/ Insulin Human 

Regular 30 unit/ Total Parenteral Nutrition/Amino Acids/Dextrose/ Fat Emulsion 

Intravenous 1,920 ml @  80 mls/hr TPN  CONT IV  Last administered on 5/2/20at 

21:51;  Start 5/2/20 at 22:00;  Stop 5/3/20 at 22:00;  Status DC


Potassium Chloride 75 meq/ Magnesium Sulfate 20 meq/Calcium Gluconate 10 meq/ 

Multivitamins 10 ml/Chromium/ Copper/Manganese/ Seleni/Zn 0.5 ml/ Insulin Human 

Regular 25 unit/ Total Parenteral Nutrition/Amino Acids/Dextrose/ Fat Emulsion 

Intravenous 1,920 ml @  80 mls/hr TPN  CONT IV  Last administered on 5/3/20at 

22:04;  Start 5/3/20 at 22:00;  Stop 5/4/20 at 21:59


Hydromorphone HCl (Dilaudid) 0.4 mg PRN Q4HRS  PRN IVP PAIN;  Start 5/4/20 at 

09:00





Active Scripts


Active


Reported


Bisoprolol Fumarate 5 Mg Tablet 10 Mg PO DAILY


Vitals/I & O





Vital Sign - Last 24 Hours








 5/3/20 5/3/20 5/3/20 5/3/20





 10:00 12:00 12:00 12:17


 


Temp   100.0 





   100.0 


 


Pulse 108  114 


 


Resp 20  44 


 


B/P (MAP) 115/65 (82)  112/56 (74) 


 


Pulse Ox 100  100 96


 


O2 Delivery Ventilator Mechanical Ventilator C-pap 5peep/10PS Ventilator


 


    





    





 5/3/20 5/3/20 5/3/20 5/3/20





 14:00 15:26 16:00 16:00


 


Temp   99.0 





   99.0 


 


Pulse 110  116 


 


Resp 38  40 


 


B/P (MAP) 119/56 (77)  134/74 (94) 


 


Pulse Ox 100 95 98 


 


O2 Delivery C-pap 5peep/10PS Ventilator C-pap 5peep/10PS Mechanical Ventilator


 


    





    





 5/3/20 5/3/20 5/3/20 5/3/20





 17:00 18:00 19:00 20:00


 


Temp    101.0





    101.0


 


Pulse 122 120 113 123


 


Resp 38 36 23 32


 


B/P (MAP) 152/78 (102) 172/74 (106) 157/75 (102) 137/69 (91)


 


Pulse Ox 98 96 96 97


 


O2 Delivery C-pap 5peep/10PS C-pap 5peep/10PS C-pap 5peep/10PS C-pap 5peep/10PS


 


    





    





 5/3/20 5/3/20 5/3/20 5/3/20





 20:00 20:23 21:00 21:51


 


Pulse   119 


 


Resp   28 


 


B/P (MAP)   181/85 (117) 


 


Pulse Ox  96 96 96


 


O2 Delivery Mechanical Ventilator Ventilator C-pap 5peep/10PS Ventilator





 5/3/20 5/3/20 5/3/20 5/4/20





 22:00 23:00 23:18 00:00


 


Temp  100.6  





  100.6  


 


Pulse 113 130  


 


Resp 30 42  


 


B/P (MAP) 140/71 (94) 153/81 (105)  


 


Pulse Ox 96 97 96 


 


O2 Delivery C-pap 5peep/10PS C-pap 5peep/10PS Ventilator Mechanical Ventilator


 


    





    





 5/4/20 5/4/20 5/4/20 5/4/20





 00:00 01:00 01:01 02:00


 


Temp 100.9   100.3





 100.9   100.3


 


Pulse 118 110  113


 


Resp 31 30  34


 


B/P (MAP) 130/69 (89) 117/60 (79)  133/65 (87)


 


Pulse Ox 98 98 96 97


 


O2 Delivery C-pap 5peep/10PS C-pap 5peep/10PS Ventilator C-pap 5peep/10PS


 


    





    





 5/4/20 5/4/20 5/4/20 5/4/20





 03:00 04:00 04:00 04:20


 


Temp  100.3  





  100.3  


 


Pulse 100 104  


 


Resp 28 32  


 


B/P (MAP) 143/78 (99) 116/60 (78)  


 


Pulse Ox 95 98  97


 


O2 Delivery C-pap 5peep/10PS C-pap 5peep/10PS Mechanical Ventilator Ventilator


 


    





    





 5/4/20 5/4/20 5/4/20 





 05:00 06:00 08:00 


 


Pulse 100 106  


 


Resp 35 41  


 


B/P (MAP) 115/68 (84) 120/63 (82)  


 


Pulse Ox 98 96 97 


 


O2 Delivery C-pap 5peep/10PS C-pap 5peep/10PS Ventilator 














Intake and Output   


 


 5/3/20 5/3/20 5/4/20





 15:00 23:00 07:00


 


Intake Total  341 ml 2464.8 ml


 


Output Total 550 ml 1080 ml 725 ml


 


Balance -550 ml -739 ml 1739.8 ml

















CLEMENTINA PANTOJA MD            May 4, 2020 09:34

## 2020-05-04 NOTE — NUR
Pharmacy TPN Dosing Note



S: JESENIA RICH is a 49 year old F Currently receiving Central Continuous TPN started 
03/18/20



B:Pertinent PMH: 

Necrotizing pancreatitis

Height: 5 feet, 8 inches

Weight: 106.3 kg



Current diet: NPO 



LABS:

Sodium:    154 

Potassium: 4.0 

Chloride:  114 

Calcium:   8.8 

Corrected Calcium: 10.56 

Magnesium: 2.2 

CO2:       33 

SCr:       1.0 

Glucose:   164 

Albumin:   1.8 

AST:       21 

ALT:       11 



TPN FORMULA:

TPN TYPE:  Central Continuous

AMINO ACIDS:         90 gm

DEXTROSE:            225 gm

LIPIDS:              30 gm

SODIUM CHLORIDE:     - mEq

SODIUM ACETATE:      - mEq

SODIUM PHOSPHATE:    - mmol

POTASSIUM CHLORIDE:  75 mEq

POTASSIUM ACETATE:   - mEq

POTASSIUM PHOSPHATE: - mmol

MAGNESIUM:           15 mEq

CALCIUM:             8 mEq

INSULIN:             25 units

MULTIPLE VITAMIN:    10 ml

TRACE ELEMENTS:      0.5 ml(s)



TPN PLAN:  

DECREASE MAGSO4 TO 15 MEQ, CALCIUM GLUCONTATE TO 8 MEQ.





R: Continue TPN at same rate

Will monitor electrolytes, glucose, and tolerance to TPN.



 YENIFER STREETER [facility], 05/04/20 [id number]

## 2020-05-04 NOTE — PDOC
Infectious Disease Note


Subjective


Subjective


Uncomfortable in abd


Feels distended


on trach with vent


Discussed with RN





Vital Sign


Vital Signs





Vital Signs








  Date Time  Temp Pulse Resp B/P (MAP) Pulse Ox O2 Delivery O2 Flow Rate FiO2


 


5/4/20 08:00     97 Ventilator  


 


5/4/20 06:00  106 41 120/63 (82)    


 


5/4/20 04:00 100.3       





 100.3       











Physical Exam


PHYSICAL EXAM


GENERAL: Propped up in bed, Alert. weak appearing but not toxic


HEENT: Pupils equal, + NGT, oral cavity dry 


NECK:  Trach/vent 


LUNGS: rhonchi 


HEART:  S1, S2, regular 


ABDOMEN: Distended, hypoactive BS, drain placement (4/27 - serous fluid)


: [place] (4/14)


EXTREMITIES: Generalized edema, no cyanosis, SCDs bilaterally 


DERMATOLOGIC:  Warm and dry.  No generalized rash.  


CENTRAL NERVOUS SYSTEM: Extremely weak, mouthing words to simple questions 


PICC line in place April 30, 2020 clean


HDC has been removed


LIJ removed





Labs


Lab





Laboratory Tests








Test


 5/3/20


12:47 5/3/20


16:09 5/3/20


17:57 5/4/20


06:10


 


Glucose (Fingerstick)


 126 mg/dL


(70-99) 


 148 mg/dL


(70-99) 





 


O2 Saturation  93 % (92-99)   


 


Arterial Blood pH


 


 7.47


(7.35-7.45) 


 





 


Arterial Blood pCO2 at


Patient Temp 


 49 mmHg


(35-46) 


 





 


Arterial Blood pO2 at Patient


Temp 


 73 mmHg


() 


 





 


Arterial Blood HCO3


 


 35 mmol/L


(21-28) 


 





 


Arterial Blood Base Excess


 


 10 mmol/L


(-3-3) 


 





 


Oxyhemoglobin  92.7 %   


 


Methemoglobin


 


 0.4 %


(0.0-1.9) 


 





 


Carbon Monoxide, Quantitative


 


 0.3 %


(0.0-1.9) 


 





 


FiO2  30   


 


Sodium Level


 


 


 


 154 mmol/L


(136-145)


 


Potassium Level


 


 


 


 4.0 mmol/L


(3.5-5.1)


 


Chloride Level


 


 


 


 114 mmol/L


()


 


Carbon Dioxide Level


 


 


 


 33 mmol/L


(21-32)


 


Anion Gap    7 (6-14) 


 


Blood Urea Nitrogen


 


 


 


 45 mg/dL


(7-20)


 


Creatinine


 


 


 


 0.9 mg/dL


(0.6-1.0)


 


Estimated GFR


(Cockcroft-Gault) 


 


 


 66.5 





 


BUN/Creatinine Ratio    50 (6-20) 


 


Glucose Level


 


 


 


 164 mg/dL


(70-99)


 


Calcium Level


 


 


 


 8.8 mg/dL


(8.5-10.1)


 


Phosphorus Level


 


 


 


 3.4 mg/dL


(2.6-4.7)


 


Magnesium Level


 


 


 


 2.2 mg/dL


(1.8-2.4)


 


Total Bilirubin


 


 


 


 0.5 mg/dL


(0.2-1.0)


 


Aspartate Amino Transf


(AST/SGOT) 


 


 


 39 U/L (15-37) 





 


Alanine Aminotransferase


(ALT/SGPT) 


 


 


 36 U/L (14-59) 





 


Alkaline Phosphatase


 


 


 


 127 U/L


()


 


Total Protein


 


 


 


 5.6 g/dL


(6.4-8.2)


 


Albumin


 


 


 


 1.8 g/dL


(3.4-5.0)


 


Albumin/Globulin Ratio    0.5 (1.0-1.7) 


 


Triglycerides Level


 


 


 


 174 mg/dL


(0-150)


 


Test


 5/4/20


06:15 


 


 





 


Glucose (Fingerstick)


 153 mg/dL


(70-99) 


 


 











Micro


4/27


Operative Note:


After obtaining informed consent, patient was taken to OR, induced under GETA 

and prepped in the usual fashion.  5 mm port placed umbilical and right mid 

abdomen, all under laparoscopic guidance.  Large amount of ascites encountered 

and aspirated off.  Fluid was clear with some whitish debris.  Viscera was 

completely locked in with obliteration of all planes, preventing any significant

exploration.  Copious irrigation.





Given patient's overall clinical improvement, favor against open procedure and 

attempt at cholecystectomy and/or necrosectomy, given high risk of 

complications.  Cholecystectomy will need to be performed, but favor waiting 3 

months.





19 ROBERT drain placed and secured with 3 0 nylon.  Skin repaired with 4 0 monocryl.

 Dressing placed.





Patient tolerated procedure well and sent to PACU in stable condition.  All 

counts correct.  Wound class is 4.
































CT Chest Abd/pelv 4/14


CT CHEST:


CT scan the chest was done without contrast. There is a tracheostomy tube 


in good position. There are large bilateral pleural effusions. There is 


atelectasis in both lung bases. There is no mediastinal adenopathy. Right 


arm PICC line extends to the superior vena cava. There is a temporary 


dialysis catheter extending to the vena cava near the atrium.


 


IMPRESSION:


1. Large bilateral effusions.


2. Atelectasis of both lungs.


 


End impression


 


CT abdomen pelvis


 


CT scan the abdomen pelvis was done following CT chest with contrast. NG 


tube extends into the stomach. Spleen is normal. There is no mass or 


hydronephrosis in the kidneys. There is a gallstone the gallbladder. A 


focal liver lesion is not identified. There is diffuse necrosis of the 


pancreas. There is peripancreatic fluid. The pattern is similar to the 


recent study. There is fluid in the paracolic gutters. There is 


retroperitoneal fluid surrounding the kidneys. Ascites extends into the 


pelvis. Ascites is similar to the prior study. There is no bowel 


obstruction. There is no free air.


 


IMPRESSION:


1. Diffuse pancreatic necrosis.


2. A extensive retroperitoneal fluid and inflammation.


3. Cholelithiasis.


4. Moderate to large volume ascites with little change.



































CT A/P, 4/9


IMPRESSION:


1. Increased ascites.


2. 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


at the pancreas


3. Cholelithiasis with thickening of the gallbladder wall.


4. Persistent pleural effusions and atelectasis in the lung bases.





Objective


Assessment


Fever - up today intermittent could be from underlying pancreatitis


Abd distention


Acute pancreatitis with persistent  necrosis


CT a/p 4/9


    Increased ascites. Persistent evidence of necrotizing pancreatitis with 

fluid and phlegmon


   at the pancreas


   4/27 status post ROBERT drain placement - yeast on cult;


Anemia 


Cholelithiasis with thickening of the gallbladder wall.


Leucocytosis improving


JED,Hyperkalemia, Metabolic acidosis off dialysis


Acute hypoxic resp failure ,bilateral pleural effusion and atelectasis


hypocalcemia 


Prediabetes


HTN


s/p trach





Plan


Plan of Care





Abd U/S - may need paracentesis


Blood cult times 2


Add Micafungin with yeast from 4/27 abd cult


Dose Dapto


CBC this am and in am


cont merrem (4/8),


Electrolytes per primary 


Anemia per primary


Follow-up Intra-Op cultures and lab 


Maintain aspiration precaution


PT and OT as tolerated


Continue supportive care





D/w nursing











RASHAWN ROSEN MD               May 4, 2020 10:04

## 2020-05-04 NOTE — PDOC
PULMONARY PROGRESS NOTES


Subjective


Patient intubated on 3/23 , s/p trach 4/6, Has tolerated PM valve well until 5/3


on CPAP, awake. ABG with worsening hypercapnia 5/3 ,PCA dilaudid, dose reduced


Vitals





Vital Signs








  Date Time  Temp Pulse Resp B/P (MAP) Pulse Ox O2 Delivery O2 Flow Rate FiO2


 


5/4/20 08:00     97 Ventilator  


 


5/4/20 06:00  106 41 120/63 (82)    


 


5/4/20 04:00 100.3       





 100.3       








ROS:  No Nausea, No Chest Pain, No Abdominal Pain, No Increase Cough


General:  Alert, Oriented X4, No acute distress


HEENT:  Other (nc at perrl   nose clear  nech  trach site ok  no lad   no 

thyromegaly)


Lungs:  Crackles


Cardiovascular:  S1, S2


Abdomen:  Soft, Non-tender, Other (firm)


Neuro Exam:  Alert


Extremities:  Other (+3 generalized edema )


Skin:  Warm, Dry


Labs





Laboratory Tests








Test


 5/2/20


13:34 5/2/20


18:06 5/3/20


00:03 5/3/20


05:25


 


Glucose (Fingerstick)


 146 mg/dL


(70-99) 135 mg/dL


(70-99) 122 mg/dL


(70-99) 113 mg/dL


(70-99)


 


Test


 5/3/20


05:30 5/3/20


07:55 5/3/20


12:47 5/3/20


16:09


 


White Blood Count


 9.7 x10^3/uL


(4.0-11.0) 


 


 





 


Red Blood Count


 2.44 x10^6/uL


(3.50-5.40) 


 


 





 


Hemoglobin


 7.1 g/dL


(12.0-15.5) 


 


 





 


Hematocrit


 22.8 %


(36.0-47.0) 


 


 





 


Mean Corpuscular Volume 93 fL ()    


 


Mean Corpuscular Hemoglobin 29 pg (25-35)    


 


Mean Corpuscular Hemoglobin


Concent 31 g/dL


(31-37) 


 


 





 


Red Cell Distribution Width


 18.1 %


(11.5-14.5) 


 


 





 


Platelet Count


 408 x10^3/uL


(140-400) 


 


 





 


Neutrophils (%) (Auto) 79 % (31-73)    


 


Lymphocytes (%) (Auto) 14 % (24-48)    


 


Monocytes (%) (Auto) 5 % (0-9)    


 


Eosinophils (%) (Auto) 2 % (0-3)    


 


Basophils (%) (Auto) 0 % (0-3)    


 


Neutrophils # (Auto)


 7.7 x10^3/uL


(1.8-7.7) 


 


 





 


Lymphocytes # (Auto)


 1.4 x10^3/uL


(1.0-4.8) 


 


 





 


Monocytes # (Auto)


 0.5 x10^3/uL


(0.0-1.1) 


 


 





 


Eosinophils # (Auto)


 0.2 x10^3/uL


(0.0-0.7) 


 


 





 


Basophils # (Auto)


 0.0 x10^3/uL


(0.0-0.2) 


 


 





 


Sodium Level


 153 mmol/L


(136-145) 


 


 





 


Potassium Level


 4.3 mmol/L


(3.5-5.1) 


 


 





 


Chloride Level


 114 mmol/L


() 


 


 





 


Carbon Dioxide Level


 38 mmol/L


(21-32) 


 


 





 


Anion Gap 1 (6-14)    


 


Blood Urea Nitrogen


 47 mg/dL


(7-20) 


 


 





 


Creatinine


 1.0 mg/dL


(0.6-1.0) 


 


 





 


Estimated GFR


(Cockcroft-Gault) 58.9 


 


 


 





 


Glucose Level


 125 mg/dL


(70-99) 


 


 





 


Calcium Level


 8.7 mg/dL


(8.5-10.1) 


 


 





 


O2 Saturation  97 % (92-99)   93 % (92-99) 


 


Arterial Blood pH


 


 7.27


(7.35-7.45) 


 7.47


(7.35-7.45)


 


Arterial Blood pCO2 at


Patient Temp 


 75 mmHg


(35-46) 


 49 mmHg


(35-46)


 


Arterial Blood pO2 at Patient


Temp 


 123 mmHg


() 


 73 mmHg


()


 


Arterial Blood HCO3


 


 34 mmol/L


(21-28) 


 35 mmol/L


(21-28)


 


Arterial Blood Base Excess


 


 6 mmol/L


(-3-3) 


 10 mmol/L


(-3-3)


 


Oxyhemoglobin  96.8 %   92.7 % 


 


Methemoglobin


 


 0.5 %


(0.0-1.9) 


 0.4 %


(0.0-1.9)


 


Carbon Monoxide, Quantitative


 


 0.1 %


(0.0-1.9) 


 0.3 %


(0.0-1.9)


 


FiO2  40   30 


 


Glucose (Fingerstick)


 


 


 126 mg/dL


(70-99) 





 


Test


 5/3/20


17:57 5/4/20


06:10 5/4/20


06:15 





 


Glucose (Fingerstick)


 148 mg/dL


(70-99) 


 153 mg/dL


(70-99) 





 


Sodium Level


 


 154 mmol/L


(136-145) 


 





 


Potassium Level


 


 4.0 mmol/L


(3.5-5.1) 


 





 


Chloride Level


 


 114 mmol/L


() 


 





 


Carbon Dioxide Level


 


 33 mmol/L


(21-32) 


 





 


Anion Gap  7 (6-14)   


 


Blood Urea Nitrogen


 


 45 mg/dL


(7-20) 


 





 


Creatinine


 


 0.9 mg/dL


(0.6-1.0) 


 





 


Estimated GFR


(Cockcroft-Gault) 


 66.5 


 


 





 


BUN/Creatinine Ratio  50 (6-20)   


 


Glucose Level


 


 164 mg/dL


(70-99) 


 





 


Calcium Level


 


 8.8 mg/dL


(8.5-10.1) 


 





 


Phosphorus Level


 


 3.4 mg/dL


(2.6-4.7) 


 





 


Magnesium Level


 


 2.2 mg/dL


(1.8-2.4) 


 





 


Total Bilirubin


 


 0.5 mg/dL


(0.2-1.0) 


 





 


Aspartate Amino Transf


(AST/SGOT) 


 39 U/L (15-37) 


 


 





 


Alanine Aminotransferase


(ALT/SGPT) 


 36 U/L (14-59) 


 


 





 


Alkaline Phosphatase


 


 127 U/L


() 


 





 


Total Protein


 


 5.6 g/dL


(6.4-8.2) 


 





 


Albumin


 


 1.8 g/dL


(3.4-5.0) 


 





 


Albumin/Globulin Ratio  0.5 (1.0-1.7)   


 


Triglycerides Level


 


 174 mg/dL


(0-150) 


 











Laboratory Tests








Test


 5/3/20


12:47 5/3/20


16:09 5/3/20


17:57 5/4/20


06:10


 


Glucose (Fingerstick)


 126 mg/dL


(70-99) 


 148 mg/dL


(70-99) 





 


O2 Saturation  93 % (92-99)   


 


Arterial Blood pH


 


 7.47


(7.35-7.45) 


 





 


Arterial Blood pCO2 at


Patient Temp 


 49 mmHg


(35-46) 


 





 


Arterial Blood pO2 at Patient


Temp 


 73 mmHg


() 


 





 


Arterial Blood HCO3


 


 35 mmol/L


(21-28) 


 





 


Arterial Blood Base Excess


 


 10 mmol/L


(-3-3) 


 





 


Oxyhemoglobin  92.7 %   


 


Methemoglobin


 


 0.4 %


(0.0-1.9) 


 





 


Carbon Monoxide, Quantitative


 


 0.3 %


(0.0-1.9) 


 





 


FiO2  30   


 


Sodium Level


 


 


 


 154 mmol/L


(136-145)


 


Potassium Level


 


 


 


 4.0 mmol/L


(3.5-5.1)


 


Chloride Level


 


 


 


 114 mmol/L


()


 


Carbon Dioxide Level


 


 


 


 33 mmol/L


(21-32)


 


Anion Gap    7 (6-14) 


 


Blood Urea Nitrogen


 


 


 


 45 mg/dL


(7-20)


 


Creatinine


 


 


 


 0.9 mg/dL


(0.6-1.0)


 


Estimated GFR


(Cockcroft-Gault) 


 


 


 66.5 





 


BUN/Creatinine Ratio    50 (6-20) 


 


Glucose Level


 


 


 


 164 mg/dL


(70-99)


 


Calcium Level


 


 


 


 8.8 mg/dL


(8.5-10.1)


 


Phosphorus Level


 


 


 


 3.4 mg/dL


(2.6-4.7)


 


Magnesium Level


 


 


 


 2.2 mg/dL


(1.8-2.4)


 


Total Bilirubin


 


 


 


 0.5 mg/dL


(0.2-1.0)


 


Aspartate Amino Transf


(AST/SGOT) 


 


 


 39 U/L (15-37) 





 


Alanine Aminotransferase


(ALT/SGPT) 


 


 


 36 U/L (14-59) 





 


Alkaline Phosphatase


 


 


 


 127 U/L


()


 


Total Protein


 


 


 


 5.6 g/dL


(6.4-8.2)


 


Albumin


 


 


 


 1.8 g/dL


(3.4-5.0)


 


Albumin/Globulin Ratio    0.5 (1.0-1.7) 


 


Triglycerides Level


 


 


 


 174 mg/dL


(0-150)


 


Test


 5/4/20


06:15 


 


 





 


Glucose (Fingerstick)


 153 mg/dL


(70-99) 


 


 











Medications





Active Scripts








 Medications  Dose


 Route/Sig


 Max Daily Dose Days Date Category


 


 Bisoprolol


 Fumarate 5 Mg


 Tablet  10 Mg


 PO DAILY


   3/16/20 Reported











Impression


.


IMPRESSION:


1.  Acute hypoxemic respiratory failure secondary to ARDS status post trach,


2.  Gallstone pancreatitis


3.  Severe metabolic acidosis.stable


4.  Acute kidney injury-stable, ON HD-- continue to improve 


5.  Acute gallstone pancreatitis.


6.  Hypoalbuminemia.


7.  Moderate persistent effusions


8.  Fever-  Per ID, per surgery


9.  Chronic anemia


10. Covid 19 testing negative


11. Moderate to large ascites-S/P paracentisis


12.S/P paracentisis with 4 liters removed on 4/15/20














Surgery note


Operative Note:


After obtaining informed consent, patient was taken to OR, induced under GETA 

and prepped in the usual fashion.  5 mm port placed umbilical and right mid 

abdomen, all under laparoscopic guidance.  Large amount of ascites encountered 

and aspirated off.  Fluid was clear with some whitish debris.  Viscera was 

completely locked in with obliteration of all planes, preventing any significant

exploration.  Copious irrigation.





Given patient's overall clinical improvement, favor against open procedure and 

attempt at cholecystectomy and/or necrosectomy, given high risk of 

complications.  Cholecystectomy will need to be performed, but favor waiting 3 

months.





19 ROBERT drain placed and secured with 3 0 nylon.  Skin repaired with 4 0 monocryl.

 Dressing placed.





Patient tolerated procedure well and sent to PACU in stable condition.  All 

counts correct.  Wound class is 4.





Plan


.


We will continue our efforts at weaning, Developed worsening hypercapnia 5/4 due

to dilaudid PCA. d/w RN / RT..dc dilaudid PCA dose. re-start TS with PM .again 

today. ABG compensated


TS trials, 24 hrs as tolerated/ PM valve after dc dilaudid 


precedex for anxiety, prn


Follow surgery input


Follow ID rec, abx per id


Follow nephrology recs 


Nutritional support per surgery: continue TPN for nutrition 


DVT/GI PPX : heparin Sq/ protonix


 


d/w RN/RT





CODE:FULL











SHARYN SOLOZRANO MD                  May 4, 2020 09:15

## 2020-05-04 NOTE — PDOC
SUBJECTIVE


ROS


Abdomen distended, Vomiting + , fever +





OBJECTIVE


Vital Signs





Vital Signs








  Date Time  Temp Pulse Resp B/P (MAP) Pulse Ox O2 Delivery O2 Flow Rate FiO2


 


5/4/20 08:00     97 Ventilator  


 


5/4/20 06:00  106 41 120/63 (82)    


 


5/4/20 04:00 100.3       





 100.3       








I & 0











Intake and Output 


 


 5/4/20





 07:00


 


Intake Total 2805.8 ml


 


Output Total 2355 ml


 


Balance 450.8 ml


 


 


 


IV Total 2805.8 ml


 


Output Urine Total 2340 ml


 


Drainage Total 15 ml











PHYSICAL EXAM


Physical Exam


GENERAL: NAD  


HEENT:  oral cavity dry 


NECK:  Trach/vent 


LUNGS: Non labored  


HEART:  S1, S2, regular 


ABDOMEN: Distended, hypoactive BS, drain placement (4/27 )


: [place] (4/14)


EXTREMITIES: Generalized edema,


DERMATOLOGIC:  Warm and dry.  No generalized rash.





DIAGNOSIS/ASSESSMENT


Assessment & Plan





ARF-- ATN,requiring CRRT and HD 


Off HD, renal function improved  Excellent UOP, dced  IV Diuretics  on 5/2 


Supportive care , currently on TPN, strict I/O, avoid nephrotoxins  


 


Anemia-  Currently on YOLI 





Hypernatremia -- DCed Bumex ,   





HyPokalemia- replace as needed 





Anasarca due to 3rd spacing , Prolonged Hospitalization   stable  


Can give IV Diuretcis prn 





Hyperkalemia- resolved  





Acute pancreatitis with persistent  necrosis


 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 

the pancreas


   4/27 status post ROBERT drain placement; 





 Cholelithiasis with possible cholecystitis.





Moderate pleural effusions, may be slightly improved. Infiltrate/atelectasis in 

both lung bases.


  


 Ascites - post paracentesis  in the past


US this am, abdomen appears distended  





Acute hypoxic resp failure ,bilateral pleural effusion


  Trach in place , On Vent  


 


HTN 





COVID-19 neg, 3/26





COMMENT/RELEVANT DATA


Meds





Current Medications








 Medications


  (Trade)  Dose


 Ordered  Sig/Yee  Start Time


 Stop Time Status Last Admin


Dose Admin


 


 Acetaminophen


  (Tylenol Supp)  650 mg  PRN Q6HRS  PRN  3/24/20 10:30


    5/3/20 23:26


650 MG


 


 Acetaminophen


  (Tylenol)  650 mg  PRN Q6HRS  PRN  3/21/20 03:36


    4/16/20 19:56


650 MG


 


 Albumin Human  200 ml @ 


 200 mls/hr  1X PRN  PRN  4/21/20 08:00


 4/21/20 13:59 DC 4/21/20 08:40


200 MLS/HR


 


 Albuterol Sulfate


  (Ventolin Neb


 Soln)  2.5 mg  1X  ONCE  3/17/20 22:30


 3/17/20 22:31 DC 3/18/20 00:56


2.5 MG


 


 Alteplase,


 Recombinant


  (Cathflo For


 Central Catheter


 Clearance)  1 mg  1X  ONCE  4/24/20 10:45


 4/24/20 10:46 DC 4/24/20 11:44


1 MG


 


 Amino Acids/


 Glycerin/


 Electrolytes  1,000 ml @ 


 75 mls/hr  [id number]  4/20/20 21:15


   UNV  





 


 Artificial Tears


  (Artificial


 Tears)  1 drop  PRN Q15MIN  PRN  4/29/20 05:30


    5/1/20 12:34


1 DROP


 


 Atenolol


  (Tenormin)  100 mg  DAILY  3/17/20 09:00


 3/16/20 20:08 DC  





 


 Atropine Sulfate


  (ATROPINE 0.5mg


 SYRINGE)  0.5 mg  PRN Q5MIN  PRN  4/2/20 08:15


     





 


 Benzocaine


  (Hurricaine One)  1 spray  1X  ONCE  3/20/20 14:30


 3/20/20 14:31 DC 3/20/20 16:38


1 SPRAY


 


 Bisacodyl


  (Dulcolax Supp)  10 mg  STK-MED ONCE  4/27/20 10:59


 4/27/20 10:59 DC  





 


 Bumetanide


  (Bumex)  2 mg  [id number]  4/28/20 14:00


 5/2/20 14:10 DC 5/1/20 13:50


2 MG


 


 Bupivacaine HCl/


 Epinephrine Bitart


  (Sensorcain-Epi


 0.5%-1:262595 Mpf)  30 ml  STK-MED ONCE  4/27/20 10:58


 4/27/20 10:58 DC 4/27/20 12:01


7 ML


 


 Calcium Carbonate/


 Glycine


  (Tums)  500 mg  PRN [id number]  PRN  3/18/20 17:45


     





 


 Calcium Chloride


 1000 mg/Sodium


 Chloride  110 ml @ 


 220 mls/hr  1X  ONCE  3/17/20 22:30


 3/17/20 22:59 DC 3/17/20 22:11


220 MLS/HR


 


 Calcium Chloride


 3000 mg/Sodium


 Chloride  1,030 ml @ 


 50 mls/hr  [id number]  3/19/20 08:00


 3/21/20 15:23 DC 3/21/20 02:17


50 MLS/HR


 


 Calcium Gluconate


  (Calcium


 Gluconate)  2,000 mg  1X  ONCE  3/19/20 02:15


 3/19/20 02:16 DC 3/19/20 02:19


2,000 MG


 


 Calcium Gluconate


 1000 mg/Sodium


 Chloride  110 ml @ 


 220 mls/hr  1X  ONCE  3/18/20 03:30


 3/18/20 03:59 DC 3/18/20 03:21


220 MLS/HR


 


 Calcium Gluconate


 2000 mg/Sodium


 Chloride  120 ml @ 


 220 mls/hr  1X  ONCE  3/18/20 07:30


 3/18/20 08:02 DC 3/18/20 09:05


220 MLS/HR


 


 Cefepime HCl


  (Maxipime)  2 gm  Q12HR  3/25/20 09:00


 4/8/20 09:58 DC 4/7/20 20:56


2 GM


 


 Cellulose


  (Surgicel


 Fibrillar 1x2)  1 each  STK-MED ONCE  4/6/20 11:00


 4/6/20 11:01 DC  





 


 Cellulose


  (Surgicel


 Hemostat 2x14)  1 each  STK-MED ONCE  4/27/20 10:58


 4/27/20 10:59 DC  





 


 Cellulose


  (Surgicel


 Hemostat 4x8)  1 each  STK-MED ONCE  4/27/20 10:58


 4/27/20 10:59 DC  





 


 Cyclobenzaprine


 HCl


  (Flexeril)  10 mg  PRN Q6HRS  PRN  4/30/20 10:45


     





 


 Daptomycin 430 mg/


 Sodium Chloride  50 ml @ 


 100 mls/hr  Q24H  4/25/20 13:00


 4/30/20 20:58 DC 4/30/20 13:00


100 MLS/HR


 


 Daptomycin 500 mg/


 Sodium Chloride  50 ml @ 


 100 mls/hr  Q48H  3/25/20 08:30


 4/10/20 10:07 DC 4/10/20 09:57


100 MLS/HR


 


 Dexamethasone


 Sodium Phosphate


  (Decadron)  4 mg  STK-MED ONCE  4/27/20 10:56


 4/27/20 10:57 DC  





 


 Dexmedetomidine


 HCl 400 mcg/


 Sodium Chloride  100 ml @ 0


 mls/hr  CONT  PRN  4/2/20 08:15


    5/4/20 05:52


8.3 MLS/HR


 


 Dextrose


  (Dextrose


 50%-Water Syringe)  12.5 gm  PRN Q15MIN  PRN  3/16/20 09:30


     





 


 Digoxin


  (Lanoxin)  125 mcg  1X  ONCE  3/19/20 18:00


 3/19/20 18:01 DC 3/19/20 17:10


125 MCG


 


 Diphenhydramine


 HCl


  (Benadryl)  25 mg  1X PRN  PRN  4/24/20 15:45


 4/25/20 15:44 DC  





 


 Enoxaparin Sodium


  (Lovenox 100mg


 Syringe)  100 mg  Q12HR  4/21/20 21:00


   UNV  





 


 Etomidate


  (Amidate)  8 mg  1X  ONCE  3/23/20 08:30


 3/23/20 08:31 DC 3/23/20 08:33


8 MG


 


 Fentanyl Citrate


  (Fentanyl 2ml


 Vial)  100 mcg  STK-MED ONCE  4/27/20 10:56


 4/27/20 10:57 DC  





 


 Furosemide


  (Lasix)  40 mg  1X  ONCE  4/13/20 14:30


 4/13/20 14:31 DC 4/13/20 14:39


40 MG


 


 Heparin Sodium


  (Porcine)


  (Hep Lock Adult)  500 unit  STK-MED ONCE  4/7/20 09:29


 4/7/20 09:30 DC  





 


 Heparin Sodium


  (Porcine)


  (Heparin Sodium)  5,000 unit  Q12HR  4/27/20 21:00


    5/3/20 21:12


5,000 UNIT


 


 Heparin Sodium


  (Porcine) 1000


 unit/Sodium


 Chloride  1,001 ml @ 


 1,001 mls/hr  1X  ONCE  4/27/20 12:00


 4/27/20 12:59 DC  





 


 Hydromorphone HCl


  (Dilaudid


 Standard PCA)  12 mg  STK-MED ONCE  4/30/20 13:47


 5/1/20 11:03 DC  





 


 Hydromorphone HCl


  (Dilaudid)  0.4 mg  PRN Q4HRS  PRN  5/4/20 09:00


     





 


 Info


  (CONTRAST GIVEN


 -- Rx MONITORING)  1 each  PRN DAILY  PRN  3/30/20 11:45


 4/1/20 11:44 DC  





 


 Info


  (Icu Electrolyte


 Protocol)  1 ea  CONT PRN  PRN  3/29/20 13:15


     





 


 Info


  (PHARMACY


 MONITORING -- do


 not chart)  1 each  PRN DAILY  PRN  4/24/20 15:45


     





 


 Info


  (Tpn Per


 Pharmacy)  1 each  PRN DAILY  PRN  3/18/20 12:30


   UNV  





 


 Insulin Human


 Lispro


  (HumaLOG)  0-9 UNITS  Q6HRS  3/16/20 09:30


    4/30/20 17:29


4 UNITS


 


 Insulin Human


 Regular


  (HumuLIN R VIAL)  5 unit  1X  ONCE  3/17/20 22:30


 3/17/20 22:31 DC 3/17/20 22:14


5 UNIT


 


 Iohexol


  (Omnipaque 240


 Mg/ml)  30 ml  1X  ONCE  3/30/20 11:30


 3/30/20 11:33 DC 3/30/20 11:30


30 ML


 


 Iohexol


  (Omnipaque 300


 Mg/ml)  50 ml  STK-MED ONCE  4/27/20 10:58


 4/27/20 10:59 DC  





 


 Iohexol


  (Omnipaque 350


 Mg/ml)  90 ml  1X  ONCE  3/16/20 03:30


 3/16/20 03:31 DC 3/16/20 03:25


90 ML


 


 Ketorolac


 Tromethamine


  (Toradol 30mg


 Vial)  30 mg  1X  ONCE  3/16/20 03:00


 3/16/20 03:01 DC 3/16/20 02:54


30 MG


 


 Lidocaine HCl


  (Buffered


 Lidocaine 1%)  6 ml  1X  ONCE  4/15/20 13:30


 4/15/20 13:31 DC 4/15/20 13:45


9 ML


 


 Lidocaine HCl


  (Glydo


  (Lidocaine) Jelly)  1 thomas  1X  ONCE  3/20/20 14:30


 3/20/20 14:31 DC 3/20/20 16:38


1 THOMAS


 


 Lidocaine HCl


  (Xylocaine-Mpf


 1% 2ml Vial)  2 ml  PRN 1X  PRN  4/27/20 07:00


 4/28/20 06:59 DC  





 


 Linezolid/Dextrose  300 ml @ 


 300 mls/hr  Q12HR  4/10/20 11:00


 4/21/20 08:10 DC 4/20/20 20:40


300 MLS/HR


 


 Lorazepam


  (Ativan Inj)  1 mg  PRN Q4HRS  PRN  3/19/20 09:00


 4/17/20 09:19 DC 4/17/20 03:51


1 MG


 


 Magnesium Sulfate  50 ml @ 25


 mls/hr  1X  ONCE  5/2/20 10:30


 5/2/20 12:29 DC 5/2/20 10:34


25 MLS/HR


 


 Meropenem 1 gm/


 Sodium Chloride  100 ml @ 


 200 mls/hr  Q8HRS  4/28/20 14:00


    5/4/20 06:06


200 MLS/HR


 


 Meropenem 500 mg/


 Sodium Chloride  50 ml @ 


 100 mls/hr  Q12H  4/8/20 10:00


 4/28/20 12:37 DC 4/28/20 10:45


100 MLS/HR


 


 Metoprolol


 Tartrate


  (Lopressor Vial)  5 mg  Q6HRS  3/17/20 10:15


 3/28/20 08:48 DC 3/26/20 00:12


5 MG


 


 Metronidazole  100 ml @ 


 100 mls/hr  Q8HRS  4/14/20 10:00


 4/21/20 08:10 DC 4/21/20 06:04


100 MLS/HR


 


 Micafungin Sodium


 100 mg/Dextrose  100 ml @ 


 100 mls/hr  Q24H  3/23/20 09:00


 4/30/20 20:58 DC 4/30/20 08:18


100 MLS/HR


 


 Midazolam HCl


  (Versed)  5 mg  1X  ONCE  3/23/20 08:30


 3/23/20 08:31 DC  





 


 Midazolam HCl 100


 mg/Sodium Chloride  100 ml @ 7


 mls/hr  CONT  PRN  3/28/20 16:00


    4/8/20 15:35


7 MLS/HR


 


 Midazolam HCl 50


 mg/Sodium Chloride  50 ml @ 0


 mls/hr  CONT  PRN  3/23/20 08:15


 3/28/20 15:59 DC 3/26/20 22:39


7 MLS/HR


 


 Morphine Sulfate


  (Morphine


 Sulfate)  2 mg  PRN Q2HR  PRN  3/16/20 05:00


 3/17/20 14:15 DC 3/17/20 12:26


2 MG


 


 Multi-Ingred


 Cream/Lotion/Oil/


 Oint


  (Artificial


 Tears Eye


 Ointment)  1 thomas  PRN Q1HR  PRN  3/25/20 17:30


    4/13/20 08:19


1 THOAMS


 


 Naloxone HCl


  (Narcan)  0.4 mg  PRN Q2MIN  PRN  4/27/20 14:30


   UNV  





 


 Norepinephrine


 Bitartrate 8 mg/


 Dextrose  258 ml @ 


 17.299 mls/


 hr  CONT  PRN  3/17/20 15:30


 4/17/20 09:19 DC 4/14/20 12:48


20.9 MLS/HR


 


 Ondansetron HCl


  (Zofran)  4 mg  STK-MED ONCE  4/27/20 10:56


 4/27/20 10:57 DC  





 


 Pantoprazole


 Sodium


  (PROTONIX VIAL


 for IV PUSH)  40 mg  DAILYAC  3/16/20 11:30


    5/3/20 09:19


40 MG


 


 Phenylephrine HCl


  (PHENYLEPHRINE


 in 0.9% NACL PF)  1 mg  STK-MED ONCE  4/27/20 12:34


 4/27/20 12:34 DC  





 


 Piperacillin Sod/


 Tazobactam Sod


 4.5 gm/Sodium


 Chloride  100 ml @ 


 200 mls/hr  1X  ONCE  3/16/20 06:00


 3/16/20 06:29 DC 3/16/20 05:44


200 MLS/HR


 


 Potassium


 Chloride 15 meq/


 Bicarbonate


 Dialysis Soln w/


 out KCl  5,007.5 ml


  @ 1,000 mls/


 hr  Q5H1M  3/29/20 20:00


 4/2/20 13:08 DC 4/1/20 18:14


1,000 MLS/HR


 


 Potassium


 Chloride 20 meq/


 Bicarbonate


 Dialysis Soln w/


 out KCl  5,010 ml @ 


 1,000 mls/hr  Q5H1M  3/25/20 16:00


 3/29/20 19:59 DC 3/29/20 14:54


1,000 MLS/HR


 


 Potassium


 Chloride 75 meq/


 Magnesium Sulfate


 20 meq/Calcium


 Gluconate 10 meq/


 Multivitamins 10


 ml/Chromium/


 Copper/Manganese/


 Seleni/Zn 0.5 ml/


 Insulin Human


 Regular 25 unit/


 Total Parenteral


 Nutrition/Amino


 Acids/Dextrose/


 Fat Emulsion


 Intravenous  1,920 ml @ 


 80 mls/hr  TPN  CONT  5/3/20 22:00


 5/4/20 21:59  5/3/20 22:04


80 MLS/HR


 


 Potassium


 Chloride 75 meq/


 Magnesium Sulfate


 20 meq/Calcium


 Gluconate 10 meq/


 Multivitamins 10


 ml/Chromium/


 Copper/Manganese/


 Seleni/Zn 0.5 ml/


 Insulin Human


 Regular 30 unit/


 Total Parenteral


 Nutrition/Amino


 Acids/Dextrose/


 Fat Emulsion


 Intravenous  1,920 ml @ 


 80 mls/hr  TPN  CONT  5/2/20 22:00


 5/3/20 22:00 DC 5/2/20 21:51


80 MLS/HR


 


 Potassium


 Chloride/Water  100 ml @ 


 100 mls/hr  Q1H  5/2/20 07:00


 5/2/20 10:59 DC 5/2/20 13:05


100 MLS/HR


 


 Potassium


 Phosphate 20 mmol/


 Sodium Chloride  106.6667


 ml @ 


 51.667 m...  1X  ONCE  3/25/20 13:00


 3/25/20 15:03 DC 3/25/20 12:51


51.667 MLS/HR


 


 Potassium Acetate


 30 meq/Magnesium


 Sulfate 20 meq/


 Calcium Gluconate


 10 meq/


 Multivitamins 10


 ml/Chromium/


 Copper/Manganese/


 Seleni/Zn 0.5 ml/


 Insulin Human


 Regular 30 unit/


 Potassium


 Chloride 30 meq/


 Total Parenteral


 Nutrition/Amino


 Acids/Dextrose/


 Fat Emulsion


 Intravenous  1,920 ml @ 


 80 mls/hr  TPN  CONT  5/1/20 22:00


 5/2/20 21:59 DC 5/1/20 22:34


80 MLS/HR


 


 Potassium Acetate


 55 meq/Magnesium


 Sulfate 20 meq/


 Calcium Gluconate


 10 meq/


 Multivitamins 10


 ml/Chromium/


 Copper/Manganese/


 Seleni/Zn 0.5 ml/


 Insulin Human


 Regular 30 unit/


 Total Parenteral


 Nutrition/Amino


 Acids/Dextrose/


 Fat Emulsion


 Intravenous  1,920 ml @ 


 80 mls/hr  TPN  CONT  4/30/20 22:00


 5/1/20 21:59 DC 5/1/20 01:00


80 MLS/HR


 


 Potassium Acetate


 55 meq/Magnesium


 Sulfate 20 meq/


 Calcium Gluconate


 10 meq/


 Multivitamins 10


 ml/Chromium/


 Copper/Manganese/


 Seleni/Zn 0.5 ml/


 Insulin Human


 Regular 35 unit/


 Total Parenteral


 Nutrition/Amino


 Acids/Dextrose/


 Fat Emulsion


 Intravenous  1,920 ml @ 


 80 mls/hr  TPN  CONT  4/28/20 22:00


 4/29/20 21:59 DC 4/28/20 22:02


80 MLS/HR


 


 Potassium Acetate


 65 meq/Magnesium


 Sulfate 20 meq/


 Calcium Gluconate


 10 meq/


 Multivitamins 10


 ml/Chromium/


 Copper/Manganese/


 Seleni/Zn 0.5 ml/


 Insulin Human


 Regular 30 unit/


 Total Parenteral


 Nutrition/Amino


 Acids/Dextrose/


 Fat Emulsion


 Intravenous  1,920 ml @ 


 80 mls/hr  TPN  CONT  4/29/20 22:00


 4/30/20 21:59 DC 4/29/20 22:22


80 MLS/HR


 


 Prochlorperazine


 Edisylate


  (Compazine)  5 mg  PACU PRN  PRN  4/27/20 07:00


 4/28/20 06:59 DC  





 


 Propofol  20 ml @ As


 Directed  STK-MED ONCE  4/27/20 12:26


 4/27/20 12:27 DC  





 


 Ringer's Solution  1,000 ml @ 


 30 mls/hr  Q24H  4/27/20 07:00


 4/27/20 18:59 DC  





 


 Rocuronium Bromide


  (Zemuron)  50 mg  STK-MED ONCE  4/27/20 10:56


 4/27/20 10:57 DC  





 


 Sevoflurane


  (Ultane)  60 ml  STK-MED ONCE  4/27/20 12:26


 4/27/20 12:27 DC  





 


 Sodium


 Bicarbonate 50


 meq/Sodium


 Chloride  1,050 ml @ 


 75 mls/hr  Q14H  3/18/20 07:30


 3/23/20 10:28 DC 3/22/20 21:10


75 MLS/HR


 


 Sodium Acetate 50


 meq/Potassium


 Acetate 55 meq/


 Magnesium Sulfate


 20 meq/Calcium


 Gluconate 10 meq/


 Multivitamins 10


 ml/Chromium/


 Copper/Manganese/


 Seleni/Zn 0.5 ml/


 Insulin Human


 Regular 35 unit/


 Total Parenteral


 Nutrition/Amino


 Acids/Dextrose/


 Fat Emulsion


 Intravenous  1,800 ml @ 


 75 mls/hr  TPN  CONT  4/25/20 22:00


 4/26/20 21:59 DC 4/25/20 22:03


75 MLS/HR


 


 Sodium Chloride  1,000 ml @ 


 25 mls/hr  Q24H  4/27/20 14:30


   UNV  





 


 Sodium Chloride


  (Normal Saline


 Flush)  3 ml  QSHIFT  PRN  4/27/20 13:45


     





 


 Sodium Chloride


 90 meq/Calcium


 Gluconate 10 meq/


 Multivitamins 10


 ml/Chromium/


 Copper/Manganese/


 Seleni/Zn 0.5 ml/


 Total Parenteral


 Nutrition/Amino


 Acids/Dextrose/


 Fat Emulsion


 Intravenous  1,512 ml @ 


 63 mls/hr  TPN  CONT  3/18/20 22:00


 3/19/20 21:59 DC 3/18/20 22:06


63 MLS/HR


 


 Sodium Chloride


 90 meq/Calcium


 Gluconate 10 meq/


 Multivitamins 10


 ml/Chromium/


 Copper/Manganese/


 Seleni/Zn 1 ml/


 Total Parenteral


 Nutrition/Amino


 Acids/Dextrose/


 Fat Emulsion


 Intravenous  55.005 ml


  @ 2.292


 mls/hr  TPN  CONT  3/18/20 22:00


 3/18/20 12:33 DC  





 


 Sodium Chloride


 90 meq/Magnesium


 Sulfate 10 meq/


 Calcium Gluconate


 20 meq/


 Multivitamins 10


 ml/Chromium/


 Copper/Manganese/


 Seleni/Zn 0.5 ml/


 Total Parenteral


 Nutrition/Amino


 Acids/Dextrose/


 Fat Emulsion


 Intravenous  1,512 ml @ 


 63 mls/hr  TPN  CONT  3/19/20 22:00


 3/20/20 21:59 DC 3/19/20 22:25


63 MLS/HR


 


 Sodium Chloride


 90 meq/Magnesium


 Sulfate 12 meq/


 Calcium Gluconate


 15 meq/


 Multivitamins 10


 ml/Chromium/


 Copper/Manganese/


 Seleni/Zn 0.5 ml/


 Insulin Human


 Regular 25 unit/


 Total Parenteral


 Nutrition/Amino


 Acids/Dextrose/


 Fat Emulsion


 Intravenous  1,400 ml @ 


 58.333 mls/


 hr  TPN  CONT  4/8/20 22:00


 4/9/20 21:59 DC 4/8/20 21:41


58.333 MLS/HR


 


 Sodium Chloride


 90 meq/Potassium


 Chloride 15 meq/


 Magnesium Sulfate


 12 meq/Calcium


 Gluconate 15 meq/


 Multivitamins 10


 ml/Chromium/


 Copper/Manganese/


 Seleni/Zn 0.5 ml/


 Insulin Human


 Regular 25 unit/


 Total Parenteral


 Nutrition/Amino


 Acids/Dextrose/


 Fat Emulsion


 Intravenous  1,400 ml @ 


 58.333 mls/


 hr  TPN  CONT  4/7/20 22:00


 4/8/20 21:59 DC 4/7/20 22:13


58.333 MLS/HR


 


 Sodium Chloride


 90 meq/Potassium


 Chloride 15 meq/


 Potassium


 Phosphate 10 mmol/


 Magnesium Sulfate


 8 meq/Calcium


 Gluconate 15 meq/


 Multivitamins 10


 ml/Chromium/


 Copper/Manganese/


 Seleni/Zn 0.5 ml/


 Insulin Human


 Regular 25 unit/


 Total Parenteral


 Nutrition/Amino


 Acids/Dextrose/


 Fat Emulsion


 Intravenous  1,400 ml @ 


 58.333 mls/


 hr  TPN  CONT  4/5/20 22:00


 4/6/20 21:59 DC 4/5/20 21:20


58.333 MLS/HR


 


 Sodium Chloride


 90 meq/Potassium


 Chloride 15 meq/


 Potassium


 Phosphate 10 mmol/


 Magnesium Sulfate


 10 meq/Calcium


 Gluconate 20 meq/


 Multivitamins 10


 ml/Chromium/


 Copper/Manganese/


 Seleni/Zn 0.5 ml/


 Total Parenteral


 Nutrition/Amino


 Acids/Dextrose/


 Fat Emulsion


 Intravenous  1,400 ml @ 


 58.333 mls/


 hr  TPN  CONT  3/23/20 22:00


 3/24/20 21:59 DC 3/23/20 21:42


58.333 MLS/HR


 


 Sodium Chloride


 90 meq/Potassium


 Chloride 15 meq/


 Potassium


 Phosphate 10 mmol/


 Magnesium Sulfate


 12 meq/Calcium


 Gluconate 15 meq/


 Multivitamins 10


 ml/Chromium/


 Copper/Manganese/


 Seleni/Zn 0.5 ml/


 Insulin Human


 Regular 25 unit/


 Total Parenteral


 Nutrition/Amino


 Acids/Dextrose/


 Fat Emulsion


 Intravenous  1,400 ml @ 


 58.333 mls/


 hr  TPN  CONT  4/6/20 22:00


 4/7/20 21:59 DC 4/6/20 22:24


58.333 MLS/HR


 


 Sodium Chloride


 90 meq/Potassium


 Chloride 15 meq/


 Potassium


 Phosphate 15 mmol/


 Magnesium Sulfate


 10 meq/Calcium


 Gluconate 15 meq/


 Multivitamins 10


 ml/Chromium/


 Copper/Manganese/


 Seleni/Zn 0.5 ml/


 Total Parenteral


 Nutrition/Amino


 Acids/Dextrose/


 Fat Emulsion


 Intravenous  1,400 ml @ 


 58.333 mls/


 hr  TPN  CONT  3/24/20 22:00


 3/25/20 21:59 DC 3/24/20 22:17


58.333 MLS/HR


 


 Sodium Chloride


 90 meq/Potassium


 Chloride 15 meq/


 Potassium


 Phosphate 15 mmol/


 Magnesium Sulfate


 10 meq/Calcium


 Gluconate 20 meq/


 Multivitamins 10


 ml/Chromium/


 Copper/Manganese/


 Seleni/Zn 0.5 ml/


 Total Parenteral


 Nutrition/Amino


 Acids/Dextrose/


 Fat Emulsion


 Intravenous  1,200 ml @ 


 50 mls/hr  TPN  CONT  3/22/20 22:00


 3/22/20 14:17 DC  





 


 Sodium Chloride


 90 meq/Potassium


 Chloride 15 meq/


 Potassium


 Phosphate 18 mmol/


 Magnesium Sulfate


 8 meq/Calcium


 Gluconate 15 meq/


 Multivitamins 10


 ml/Chromium/


 Copper/Manganese/


 Seleni/Zn 0.5 ml/


 Insulin Human


 Regular 10 unit/


 Total Parenteral


 Nutrition/Amino


 Acids/Dextrose/


 Fat Emulsion


 Intravenous  1,400 ml @ 


 58.333 mls/


 hr  TPN  CONT  3/27/20 22:00


 3/28/20 21:59 DC 3/27/20 21:43


58.333 MLS/HR


 


 Sodium Chloride


 90 meq/Potassium


 Chloride 15 meq/


 Potassium


 Phosphate 18 mmol/


 Magnesium Sulfate


 8 meq/Calcium


 Gluconate 15 meq/


 Multivitamins 10


 ml/Chromium/


 Copper/Manganese/


 Seleni/Zn 0.5 ml/


 Insulin Human


 Regular 15 unit/


 Total Parenteral


 Nutrition/Amino


 Acids/Dextrose/


 Fat Emulsion


 Intravenous  1,400 ml @ 


 58.333 mls/


 hr  TPN  CONT  3/30/20 22:00


 3/31/20 21:59 DC 3/30/20 21:47


58.333 MLS/HR


 


 Sodium Chloride


 90 meq/Potassium


 Chloride 15 meq/


 Potassium


 Phosphate 18 mmol/


 Magnesium Sulfate


 8 meq/Calcium


 Gluconate 15 meq/


 Multivitamins 10


 ml/Chromium/


 Copper/Manganese/


 Seleni/Zn 0.5 ml/


 Insulin Human


 Regular 20 unit/


 Total Parenteral


 Nutrition/Amino


 Acids/Dextrose/


 Fat Emulsion


 Intravenous  1,400 ml @ 


 58.333 mls/


 hr  TPN  CONT  4/2/20 22:00


 4/3/20 21:59 DC 4/2/20 22:45


58.333 MLS/HR


 


 Sodium Chloride


 90 meq/Potassium


 Chloride 15 meq/


 Potassium


 Phosphate 18 mmol/


 Magnesium Sulfate


 8 meq/Calcium


 Gluconate 15 meq/


 Multivitamins 10


 ml/Chromium/


 Copper/Manganese/


 Seleni/Zn 0.5 ml/


 Total Parenteral


 Nutrition/Amino


 Acids/Dextrose/


 Fat Emulsion


 Intravenous  1,400 ml @ 


 58.333 mls/


 hr  TPN  CONT  3/26/20 22:00


 3/27/20 21:59 DC 3/26/20 22:00


58.333 MLS/HR


 


 Sodium Chloride


 90 meq/Potassium


 Phosphate 15 mmol/


 Magnesium Sulfate


 12 meq/Calcium


 Gluconate 15 meq/


 Multivitamins 10


 ml/Chromium/


 Copper/Manganese/


 Seleni/Zn 0.5 ml/


 Insulin Human


 Regular 30 unit/


 Total Parenteral


 Nutrition/Amino


 Acids/Dextrose/


 Fat Emulsion


 Intravenous  1,400 ml @ 


 58.333 mls/


 hr  TPN  CONT  4/10/20 22:00


 4/11/20 21:59 DC 4/10/20 21:49


58.333 MLS/HR


 


 Sodium Chloride


 90 meq/Potassium


 Phosphate 15 mmol/


 Magnesium Sulfate


 12 meq/Calcium


 Gluconate 15 meq/


 Multivitamins 10


 ml/Chromium/


 Copper/Manganese/


 Seleni/Zn 0.5 ml/


 Insulin Human


 Regular 40 unit/


 Total Parenteral


 Nutrition/Amino


 Acids/Dextrose/


 Fat Emulsion


 Intravenous  1,400 ml @ 


 58.333 mls/


 hr  TPN  CONT  4/11/20 22:00


 4/12/20 21:59 DC 4/11/20 21:21


58.333 MLS/HR


 


 Sodium Chloride


 90 meq/Potassium


 Phosphate 19 mmol/


 Magnesium Sulfate


 12 meq/Calcium


 Gluconate 15 meq/


 Multivitamins 10


 ml/Chromium/


 Copper/Manganese/


 Seleni/Zn 0.5 ml/


 Insulin Human


 Regular 40 unit/


 Total Parenteral


 Nutrition/Amino


 Acids/Dextrose/


 Fat Emulsion


 Intravenous  1,400 ml @ 


 58.333 mls/


 hr  TPN  CONT  4/12/20 22:00


 4/13/20 21:59 DC 4/12/20 21:54


58.333 MLS/HR


 


 Sodium Chloride


 90 meq/Potassium


 Phosphate 5 mmol/


 Magnesium Sulfate


 12 meq/Calcium


 Gluconate 15 meq/


 Multivitamins 10


 ml/Chromium/


 Copper/Manganese/


 Seleni/Zn 0.5 ml/


 Insulin Human


 Regular 30 unit/


 Total Parenteral


 Nutrition/Amino


 Acids/Dextrose/


 Fat Emulsion


 Intravenous  1,400 ml @ 


 58.333 mls/


 hr  TPN  CONT  4/9/20 22:00


 4/10/20 21:59 DC 4/9/20 22:08


58.333 MLS/HR


 


 Sodium Chloride


 100 meq/Potassium


 Chloride 40 meq/


 Magnesium Sulfate


 15 meq/Calcium


 Gluconate 15 meq/


 Multivitamins 10


 ml/Chromium/


 Copper/Manganese/


 Seleni/Zn 0.5 ml/


 Insulin Human


 Regular 35 unit/


 Total Parenteral


 Nutrition/Amino


 Acids/Dextrose/


 Fat Emulsion


 Intravenous  1,400 ml @ 


 58.333 mls/


 hr  TPN  CONT  4/19/20 22:00


 4/20/20 21:59 DC 4/19/20 22:46


58.333 MLS/HR


 


 Sodium Chloride


 100 meq/Potassium


 Chloride 40 meq/


 Magnesium Sulfate


 20 meq/Calcium


 Gluconate 10 meq/


 Multivitamins 10


 ml/Chromium/


 Copper/Manganese/


 Seleni/Zn 0.5 ml/


 Insulin Human


 Regular 35 unit/


 Total Parenteral


 Nutrition/Amino


 Acids/Dextrose/


 Fat Emulsion


 Intravenous  1,400 ml @ 


 58.333 mls/


 hr  TPN  CONT  4/23/20 22:00


 4/24/20 21:59 DC 4/24/20 00:06


58.333 MLS/HR


 


 Sodium Chloride


 100 meq/Potassium


 Chloride 40 meq/


 Magnesium Sulfate


 20 meq/Calcium


 Gluconate 15 meq/


 Multivitamins 10


 ml/Chromium/


 Copper/Manganese/


 Seleni/Zn 0.5 ml/


 Insulin Human


 Regular 35 unit/


 Total Parenteral


 Nutrition/Amino


 Acids/Dextrose/


 Fat Emulsion


 Intravenous  1,400 ml @ 


 58.333 mls/


 hr  TPN  CONT  4/22/20 22:00


 4/23/20 21:59 DC 4/22/20 22:27


58.333 MLS/HR


 


 Sodium Chloride


 100 meq/Potassium


 Phosphate 10 mmol/


 Magnesium Sulfate


 12 meq/Calcium


 Gluconate 15 meq/


 Multivitamins 10


 ml/Chromium/


 Copper/Manganese/


 Seleni/Zn 0.5 ml/


 Insulin Human


 Regular 35 unit/


 Potassium


 Chloride 20 meq/


 Total Parenteral


 Nutrition/Amino


 Acids/Dextrose/


 Fat Emulsion


 Intravenous  1,400 ml @ 


 58.333 mls/


 hr  TPN  CONT  4/16/20 22:00


 4/17/20 21:59 DC 4/16/20 22:10


58.333 MLS/HR


 


 Sodium Chloride


 100 meq/Potassium


 Phosphate 19 mmol/


 Magnesium Sulfate


 12 meq/Calcium


 Gluconate 15 meq/


 Multivitamins 10


 ml/Chromium/


 Copper/Manganese/


 Seleni/Zn 0.5 ml/


 Insulin Human


 Regular 40 unit/


 Potassium


 Chloride 20 meq/


 Total Parenteral


 Nutrition/Amino


 Acids/Dextrose/


 Fat Emulsion


 Intravenous  1,400 ml @ 


 58.333 mls/


 hr  TPN  CONT  4/15/20 22:00


 4/16/20 21:59 DC 4/15/20 21:20


58.333 MLS/HR


 


 Sodium Chloride


 100 meq/Potassium


 Phosphate 5 mmol/


 Magnesium Sulfate


 12 meq/Calcium


 Gluconate 15 meq/


 Multivitamins 10


 ml/Chromium/


 Copper/Manganese/


 Seleni/Zn 0.5 ml/


 Insulin Human


 Regular 35 unit/


 Potassium


 Chloride 20 meq/


 Total Parenteral


 Nutrition/Amino


 Acids/Dextrose/


 Fat Emulsion


 Intravenous  1,400 ml @ 


 58.333 mls/


 hr  TPN  CONT  4/17/20 22:00


 4/18/20 21:59 DC 4/17/20 22:59


58.333 MLS/HR


 


 Succinylcholine


 Chloride


  (Anectine)  120 mg  1X  ONCE  3/23/20 08:30


 3/23/20 08:31 DC 3/23/20 08:34


120 MG








Lab





Laboratory Tests








Test


 5/3/20


12:47 5/3/20


16:09 5/3/20


17:57 5/4/20


06:10


 


Glucose (Fingerstick)


 126 mg/dL


(70-99) 


 148 mg/dL


(70-99) 





 


O2 Saturation  93 % (92-99)   


 


Arterial Blood pH


 


 7.47


(7.35-7.45) 


 





 


Arterial Blood pCO2 at


Patient Temp 


 49 mmHg


(35-46) 


 





 


Arterial Blood pO2 at Patient


Temp 


 73 mmHg


() 


 





 


Arterial Blood HCO3


 


 35 mmol/L


(21-28) 


 





 


Arterial Blood Base Excess


 


 10 mmol/L


(-3-3) 


 





 


Oxyhemoglobin  92.7 %   


 


Methemoglobin


 


 0.4 %


(0.0-1.9) 


 





 


Carbon Monoxide, Quantitative


 


 0.3 %


(0.0-1.9) 


 





 


FiO2  30   


 


Sodium Level


 


 


 


 154 mmol/L


(136-145)


 


Potassium Level


 


 


 


 4.0 mmol/L


(3.5-5.1)


 


Chloride Level


 


 


 


 114 mmol/L


()


 


Carbon Dioxide Level


 


 


 


 33 mmol/L


(21-32)


 


Anion Gap    7 (6-14) 


 


Blood Urea Nitrogen


 


 


 


 45 mg/dL


(7-20)


 


Creatinine


 


 


 


 0.9 mg/dL


(0.6-1.0)


 


Estimated GFR


(Cockcroft-Gault) 


 


 


 66.5 





 


BUN/Creatinine Ratio    50 (6-20) 


 


Glucose Level


 


 


 


 164 mg/dL


(70-99)


 


Calcium Level


 


 


 


 8.8 mg/dL


(8.5-10.1)


 


Phosphorus Level


 


 


 


 3.4 mg/dL


(2.6-4.7)


 


Magnesium Level


 


 


 


 2.2 mg/dL


(1.8-2.4)


 


Total Bilirubin


 


 


 


 0.5 mg/dL


(0.2-1.0)


 


Aspartate Amino Transf


(AST/SGOT) 


 


 


 39 U/L (15-37) 





 


Alanine Aminotransferase


(ALT/SGPT) 


 


 


 36 U/L (14-59) 





 


Alkaline Phosphatase


 


 


 


 127 U/L


()


 


Total Protein


 


 


 


 5.6 g/dL


(6.4-8.2)


 


Albumin


 


 


 


 1.8 g/dL


(3.4-5.0)


 


Albumin/Globulin Ratio    0.5 (1.0-1.7) 


 


Triglycerides Level


 


 


 


 174 mg/dL


(0-150)


 


Test


 5/4/20


06:15 


 


 





 


Glucose (Fingerstick)


 153 mg/dL


(70-99) 


 


 











Results


All relevant outside records, renal labs, imaging studies, telemetry/EKG's were 

reviewed.











KIMBERLY MYERS MD                 May 4, 2020 09:31

## 2020-05-04 NOTE — PDOC
SURGICAL PROGRESS NOTE


Subjective


Pt out of room


will f/u in AM.


Vital Signs





Vital Signs








  Date Time  Temp Pulse Resp B/P (MAP) Pulse Ox O2 Delivery O2 Flow Rate FiO2


 


5/4/20 15:40     99 Tracheal Collar 10.0 


 


5/4/20 15:00  118 25 118/97 (104)    


 


5/4/20 12:00 99.4       





 99.4       








I&O











Intake and Output 


 


 5/4/20





 07:00


 


Intake Total 2805.8 ml


 


Output Total 2455 ml


 


Balance 350.8 ml


 


 


 


IV Total 2805.8 ml


 


Output Urine Total 2440 ml


 


Drainage Total 15 ml








Labs





Laboratory Tests








Test


 5/2/20


18:06 5/3/20


00:03 5/3/20


05:25 5/3/20


05:30


 


Glucose (Fingerstick)


 135 mg/dL


(70-99) 122 mg/dL


(70-99) 113 mg/dL


(70-99) 





 


White Blood Count


 


 


 


 9.7 x10^3/uL


(4.0-11.0)


 


Red Blood Count


 


 


 


 2.44 x10^6/uL


(3.50-5.40)


 


Hemoglobin


 


 


 


 7.1 g/dL


(12.0-15.5)


 


Hematocrit


 


 


 


 22.8 %


(36.0-47.0)


 


Mean Corpuscular Volume    93 fL () 


 


Mean Corpuscular Hemoglobin    29 pg (25-35) 


 


Mean Corpuscular Hemoglobin


Concent 


 


 


 31 g/dL


(31-37)


 


Red Cell Distribution Width


 


 


 


 18.1 %


(11.5-14.5)


 


Platelet Count


 


 


 


 408 x10^3/uL


(140-400)


 


Neutrophils (%) (Auto)    79 % (31-73) 


 


Lymphocytes (%) (Auto)    14 % (24-48) 


 


Monocytes (%) (Auto)    5 % (0-9) 


 


Eosinophils (%) (Auto)    2 % (0-3) 


 


Basophils (%) (Auto)    0 % (0-3) 


 


Neutrophils # (Auto)


 


 


 


 7.7 x10^3/uL


(1.8-7.7)


 


Lymphocytes # (Auto)


 


 


 


 1.4 x10^3/uL


(1.0-4.8)


 


Monocytes # (Auto)


 


 


 


 0.5 x10^3/uL


(0.0-1.1)


 


Eosinophils # (Auto)


 


 


 


 0.2 x10^3/uL


(0.0-0.7)


 


Basophils # (Auto)


 


 


 


 0.0 x10^3/uL


(0.0-0.2)


 


Sodium Level


 


 


 


 153 mmol/L


(136-145)


 


Potassium Level


 


 


 


 4.3 mmol/L


(3.5-5.1)


 


Chloride Level


 


 


 


 114 mmol/L


()


 


Carbon Dioxide Level


 


 


 


 38 mmol/L


(21-32)


 


Anion Gap    1 (6-14) 


 


Blood Urea Nitrogen


 


 


 


 47 mg/dL


(7-20)


 


Creatinine


 


 


 


 1.0 mg/dL


(0.6-1.0)


 


Estimated GFR


(Cockcroft-Gault) 


 


 


 58.9 





 


Glucose Level


 


 


 


 125 mg/dL


(70-99)


 


Calcium Level


 


 


 


 8.7 mg/dL


(8.5-10.1)


 


Test


 5/3/20


07:55 5/3/20


12:47 5/3/20


16:09 5/3/20


17:57


 


O2 Saturation 97 % (92-99)   93 % (92-99)  


 


Arterial Blood pH


 7.27


(7.35-7.45) 


 7.47


(7.35-7.45) 





 


Arterial Blood pCO2 at


Patient Temp 75 mmHg


(35-46) 


 49 mmHg


(35-46) 





 


Arterial Blood pO2 at Patient


Temp 123 mmHg


() 


 73 mmHg


() 





 


Arterial Blood HCO3


 34 mmol/L


(21-28) 


 35 mmol/L


(21-28) 





 


Arterial Blood Base Excess


 6 mmol/L


(-3-3) 


 10 mmol/L


(-3-3) 





 


Oxyhemoglobin 96.8 %   92.7 %  


 


Methemoglobin


 0.5 %


(0.0-1.9) 


 0.4 %


(0.0-1.9) 





 


Carbon Monoxide, Quantitative


 0.1 %


(0.0-1.9) 


 0.3 %


(0.0-1.9) 





 


FiO2 40   30  


 


Glucose (Fingerstick)


 


 126 mg/dL


(70-99) 


 148 mg/dL


(70-99)


 


Test


 5/4/20


06:10 5/4/20


06:15 5/4/20


10:30 5/4/20


11:58


 


Sodium Level


 154 mmol/L


(136-145) 


 


 





 


Potassium Level


 4.0 mmol/L


(3.5-5.1) 


 


 





 


Chloride Level


 114 mmol/L


() 


 


 





 


Carbon Dioxide Level


 33 mmol/L


(21-32) 


 


 





 


Anion Gap 7 (6-14)    


 


Blood Urea Nitrogen


 45 mg/dL


(7-20) 


 


 





 


Creatinine


 0.9 mg/dL


(0.6-1.0) 


 


 





 


Estimated GFR


(Cockcroft-Gault) 66.5 


 


 


 





 


BUN/Creatinine Ratio 50 (6-20)    


 


Glucose Level


 164 mg/dL


(70-99) 


 


 





 


Calcium Level


 8.8 mg/dL


(8.5-10.1) 


 


 





 


Phosphorus Level


 3.4 mg/dL


(2.6-4.7) 


 


 





 


Magnesium Level


 2.2 mg/dL


(1.8-2.4) 


 


 





 


Total Bilirubin


 0.5 mg/dL


(0.2-1.0) 


 


 





 


Aspartate Amino Transf


(AST/SGOT) 39 U/L (15-37) 


 


 


 





 


Alanine Aminotransferase


(ALT/SGPT) 36 U/L (14-59) 


 


 


 





 


Alkaline Phosphatase


 127 U/L


() 


 


 





 


Total Protein


 5.6 g/dL


(6.4-8.2) 


 


 





 


Albumin


 1.8 g/dL


(3.4-5.0) 


 


 





 


Albumin/Globulin Ratio 0.5 (1.0-1.7)    


 


Triglycerides Level


 174 mg/dL


(0-150) 


 


 





 


Glucose (Fingerstick)


 


 153 mg/dL


(70-99) 


 182 mg/dL


(70-99)


 


White Blood Count


 


 


 9.9 x10^3/uL


(4.0-11.0) 





 


Red Blood Count


 


 


 2.49 x10^6/uL


(3.50-5.40) 





 


Hemoglobin


 


 


 7.1 g/dL


(12.0-15.5) 





 


Hematocrit


 


 


 22.7 %


(36.0-47.0) 





 


Mean Corpuscular Volume   91 fL ()  


 


Mean Corpuscular Hemoglobin   29 pg (25-35)  


 


Mean Corpuscular Hemoglobin


Concent 


 


 31 g/dL


(31-37) 





 


Red Cell Distribution Width


 


 


 18.9 %


(11.5-14.5) 





 


Platelet Count


 


 


 458 x10^3/uL


(140-400) 





 


Neutrophils (%) (Auto)   74 % (31-73)  


 


Lymphocytes (%) (Auto)   19 % (24-48)  


 


Monocytes (%) (Auto)   5 % (0-9)  


 


Eosinophils (%) (Auto)   2 % (0-3)  


 


Basophils (%) (Auto)   0 % (0-3)  


 


Neutrophils # (Auto)


 


 


 7.3 x10^3/uL


(1.8-7.7) 





 


Lymphocytes # (Auto)


 


 


 1.9 x10^3/uL


(1.0-4.8) 





 


Monocytes # (Auto)


 


 


 0.5 x10^3/uL


(0.0-1.1) 





 


Eosinophils # (Auto)


 


 


 0.1 x10^3/uL


(0.0-0.7) 





 


Basophils # (Auto)


 


 


 0.0 x10^3/uL


(0.0-0.2) 











Laboratory Tests








Test


 5/3/20


17:57 5/4/20


06:10 5/4/20


06:15 5/4/20


10:30


 


Glucose (Fingerstick)


 148 mg/dL


(70-99) 


 153 mg/dL


(70-99) 





 


Sodium Level


 


 154 mmol/L


(136-145) 


 





 


Potassium Level


 


 4.0 mmol/L


(3.5-5.1) 


 





 


Chloride Level


 


 114 mmol/L


() 


 





 


Carbon Dioxide Level


 


 33 mmol/L


(21-32) 


 





 


Anion Gap  7 (6-14)   


 


Blood Urea Nitrogen


 


 45 mg/dL


(7-20) 


 





 


Creatinine


 


 0.9 mg/dL


(0.6-1.0) 


 





 


Estimated GFR


(Cockcroft-Gault) 


 66.5 


 


 





 


BUN/Creatinine Ratio  50 (6-20)   


 


Glucose Level


 


 164 mg/dL


(70-99) 


 





 


Calcium Level


 


 8.8 mg/dL


(8.5-10.1) 


 





 


Phosphorus Level


 


 3.4 mg/dL


(2.6-4.7) 


 





 


Magnesium Level


 


 2.2 mg/dL


(1.8-2.4) 


 





 


Total Bilirubin


 


 0.5 mg/dL


(0.2-1.0) 


 





 


Aspartate Amino Transf


(AST/SGOT) 


 39 U/L (15-37) 


 


 





 


Alanine Aminotransferase


(ALT/SGPT) 


 36 U/L (14-59) 


 


 





 


Alkaline Phosphatase


 


 127 U/L


() 


 





 


Total Protein


 


 5.6 g/dL


(6.4-8.2) 


 





 


Albumin


 


 1.8 g/dL


(3.4-5.0) 


 





 


Albumin/Globulin Ratio  0.5 (1.0-1.7)   


 


Triglycerides Level


 


 174 mg/dL


(0-150) 


 





 


White Blood Count


 


 


 


 9.9 x10^3/uL


(4.0-11.0)


 


Red Blood Count


 


 


 


 2.49 x10^6/uL


(3.50-5.40)


 


Hemoglobin


 


 


 


 7.1 g/dL


(12.0-15.5)


 


Hematocrit


 


 


 


 22.7 %


(36.0-47.0)


 


Mean Corpuscular Volume    91 fL () 


 


Mean Corpuscular Hemoglobin    29 pg (25-35) 


 


Mean Corpuscular Hemoglobin


Concent 


 


 


 31 g/dL


(31-37)


 


Red Cell Distribution Width


 


 


 


 18.9 %


(11.5-14.5)


 


Platelet Count


 


 


 


 458 x10^3/uL


(140-400)


 


Neutrophils (%) (Auto)    74 % (31-73) 


 


Lymphocytes (%) (Auto)    19 % (24-48) 


 


Monocytes (%) (Auto)    5 % (0-9) 


 


Eosinophils (%) (Auto)    2 % (0-3) 


 


Basophils (%) (Auto)    0 % (0-3) 


 


Neutrophils # (Auto)


 


 


 


 7.3 x10^3/uL


(1.8-7.7)


 


Lymphocytes # (Auto)


 


 


 


 1.9 x10^3/uL


(1.0-4.8)


 


Monocytes # (Auto)


 


 


 


 0.5 x10^3/uL


(0.0-1.1)


 


Eosinophils # (Auto)


 


 


 


 0.1 x10^3/uL


(0.0-0.7)


 


Basophils # (Auto)


 


 


 


 0.0 x10^3/uL


(0.0-0.2)


 


Test


 5/4/20


11:58 


 


 





 


Glucose (Fingerstick)


 182 mg/dL


(70-99) 


 


 











Problem List


Problems


Medical Problems:


(1) Acute pancreatitis


Status: Acute  





(2) Cholelithiasis


Status: Acute  

















GAMAL ZHOU MD              May 4, 2020 16:40

## 2020-05-04 NOTE — PDOC
PROGRESS NOTES


Chief Complaint


Chief Complaint


Acute hypoxic Respiratory failure requiring mechanical ventilation (on vent 

since 3/23)


Tracheostomy


bilateral pleural effusions/pulm edema 


Sepsis


Severe Acute gallstone pancreatitis (not a surgical candidate at this time) with

necrosis


Acute kidney failure now requiring dialysis


Salpingitis


Gallstones (Calculus of gallbladder with acute cholecystitis without 

obstruction)


HTN 


Leukocytosis 


Hypoxia


Uterine fibroid


Intractable pain


Intractable nausea


Covid 19 negative. 


Acute on chronic anemia 


EEG: No seizure activity


ESRD on HD


Hyperglycemia





Plan:


supportive measures


grim prognosis





History of Present Illness


History of Present Illness


Ms Tadeo is a 48yo F w/ PMHx HTN, prediabetes who presented to the emergency 

room with complaints of abdominal pain on 3/16/2020. Found with Lipase 62148, 

, , Bilirubin 1.4.


CT abdomen confirms pancreatic inflammation, peripancreatic fluid and 

inflammatory changes around the pancreas consistent with pancreatitis. 

Cholelithiasis and 1.4cm uterine fibroid as well as possible left salpingitis. 

Admitted for further care


GI, General surgery, ID, Pulm consulted.





3/17: PICC placed per IR. Renal US negative. Started on levophed. Repeat CT 

abdomen w/ necrosis; 3/18: Dialysis catheter per nephrology; 3/19: On BiPAP; 

3/20: BiPAP, dialysis; 3/21: Overnight Tmax 101.7 , still on BiPAP FiO2 40%, 

still on low dose Levophed gtt, TPN initiated. On dialysis


4/6: Tracheostomy; 4/12: S/p tracheostomy on vent spontaneous respirations with 

5 of pressure support 35% FiO2, rectal tube and a [place], off pressors; 

4/14:Still on vent via trach. Removed PICC and CVC LIJ and replaced. CT 

chest/abd/pelvis with bilateral pleural effusion and ascites.


4/15: Renal function stable. Still on vent. More interactive today. Miming wish 

for food. Plan discussed for thoracentesis/paracentesis with daughters today. 

They were under impression patient was doing worse due to a miscommunication 

which has been clarified over the phone. 4.3L removed.


4/17: Febrile overnight 101.8F. More interactive, still on vent. Asking for ice 

by miming; 4/18: Afebrile overnight. TMax last 24 hours 100.6F. Hb 7.1. 

Interactive when awake. 4/22: Transfusion 1u PRBC (6U total since admit)


4/23-4/26: TPN and precedex, vent.


4/27: Tmax 101F overnight. Hb 8.2. HD cath out since 4/24. Alert. On vent SIMV 

35% FiO2. Surgery: ex-lap, no naif or pancreatic necrosectomy 2/2 profound 

inflammation.


4/28: Seen POD #1. Afebrile overnight. BUN 62. CBC WNL today.Remains on vent via

trach, TPN. Able to point today and indicate she wishes her daughters and Rolf 

to be involved in her care.


4/29: Hb 7.4, Na 151. Remains on vent via trach, TPN. off HD for now. Not 

tolerating trach shield well.


4/30: Negative US for UE DVT. More relaxed today. Has some increase in UOP. 

Tolerating vent well. She is requesting to eat and drink via writing. T max 100F

24 hours ago, but 101F at 1200. Right PICC placed. Speaking valve for half the 

day in [facility]


5/1: Na 153 today. Good UOP. Tmax 101F at 1200 on 4/30. She becomes a bit 

anxious and did not sleep much last night, wishes to try to sleep this morning


5/2: Able to speak well with speaking valve today. Na 153, K2.9, Mg 1.8, Cr 1. 

100.4F axillary temp overnight. Asking for food.


5/3: Afebrile overnight. ABG with pH 7.27/75/123. Hb 7.1. Na 153. PCA settings 

decreased


5/4: No acute events reported overnight, case discussed with nursing staff 

patient in no acute distress no complaints during my visit





Plan:


Cont vent weaning. will need BIPAP


Decrease opioids, may need to d/c PCA. Would favor long active basal fentanyl 

patch 12.5mg and Q3hr prn dilaudid 1mg, will d/w general surgery.


Trach shield and speaking valve during day when awake. Would recommend ABG after

4 hours to r/o CO2 retention, however and still vent overnight


Monitor fever curve, no obvious source of infection, possibly some aspiration 

events, atelectasis





Vitals


Vitals





Vital Signs








  Date Time  Temp Pulse Resp B/P (MAP) Pulse Ox O2 Delivery O2 Flow Rate FiO2


 


5/4/20 18:00  115 32 140/67 (91) 100 Tracheal Collar 10.0 


 


5/4/20 16:00 97.0       





 97.0       











Physical Exam


Physical Exam


GENERAL: Propped up in bed, Alert. weak appearing but not toxic


HEENT: Pupils equal, + NGT, oral cavity dry 


NECK:  Trach/vent 


LUNGS: rhonchi 


HEART:  S1, S2, regular 


ABDOMEN: Distended, hypoactive BS, drain placement (4/27 - serous fluid)


: [place]ino (4/14)


EXTREMITIES: Generalized edema, no cyanosis, SCDs bilaterally 


DERMATOLOGIC:  Warm and dry.  No generalized rash.  


CENTRAL NERVOUS SYSTEM: Extremely weak, mouthing words to simple questions 


PICC line in place April 30, 2020 clean


HDC has been removed


LIJ removed


General:  Alert, Cooperative, mild distress


Heart:  Regular rate, No murmurs


Lungs:  Crackles


Abdomen:  Normal bowel sounds, Soft, Other (Mildly tender in epigastrium no 

peritoneal signs)


Extremities:  No edema


Skin:  Other (mottling noted to extremities )





Labs


LABS





Laboratory Tests








Test


 5/4/20


06:10 5/4/20


06:15 5/4/20


10:30 5/4/20


11:58


 


Sodium Level


 154 mmol/L


(136-145) 


 


 





 


Potassium Level


 4.0 mmol/L


(3.5-5.1) 


 


 





 


Chloride Level


 114 mmol/L


() 


 


 





 


Carbon Dioxide Level


 33 mmol/L


(21-32) 


 


 





 


Anion Gap 7 (6-14)    


 


Blood Urea Nitrogen


 45 mg/dL


(7-20) 


 


 





 


Creatinine


 0.9 mg/dL


(0.6-1.0) 


 


 





 


Estimated GFR


(Cockcroft-Gault) 66.5 


 


 


 





 


BUN/Creatinine Ratio 50 (6-20)    


 


Glucose Level


 164 mg/dL


(70-99) 


 


 





 


Calcium Level


 8.8 mg/dL


(8.5-10.1) 


 


 





 


Phosphorus Level


 3.4 mg/dL


(2.6-4.7) 


 


 





 


Magnesium Level


 2.2 mg/dL


(1.8-2.4) 


 


 





 


Total Bilirubin


 0.5 mg/dL


(0.2-1.0) 


 


 





 


Aspartate Amino Transf


(AST/SGOT) 39 U/L (15-37) 


 


 


 





 


Alanine Aminotransferase


(ALT/SGPT) 36 U/L (14-59) 


 


 


 





 


Alkaline Phosphatase


 127 U/L


() 


 


 





 


Total Protein


 5.6 g/dL


(6.4-8.2) 


 


 





 


Albumin


 1.8 g/dL


(3.4-5.0) 


 


 





 


Albumin/Globulin Ratio 0.5 (1.0-1.7)    


 


Triglycerides Level


 174 mg/dL


(0-150) 


 


 





 


Glucose (Fingerstick)


 


 153 mg/dL


(70-99) 


 182 mg/dL


(70-99)


 


White Blood Count


 


 


 9.9 x10^3/uL


(4.0-11.0) 





 


Red Blood Count


 


 


 2.49 x10^6/uL


(3.50-5.40) 





 


Hemoglobin


 


 


 7.1 g/dL


(12.0-15.5) 





 


Hematocrit


 


 


 22.7 %


(36.0-47.0) 





 


Mean Corpuscular Volume   91 fL ()  


 


Mean Corpuscular Hemoglobin   29 pg (25-35)  


 


Mean Corpuscular Hemoglobin


Concent 


 


 31 g/dL


(31-37) 





 


Red Cell Distribution Width


 


 


 18.9 %


(11.5-14.5) 





 


Platelet Count


 


 


 458 x10^3/uL


(140-400) 





 


Neutrophils (%) (Auto)   74 % (31-73)  


 


Lymphocytes (%) (Auto)   19 % (24-48)  


 


Monocytes (%) (Auto)   5 % (0-9)  


 


Eosinophils (%) (Auto)   2 % (0-3)  


 


Basophils (%) (Auto)   0 % (0-3)  


 


Neutrophils # (Auto)


 


 


 7.3 x10^3/uL


(1.8-7.7) 





 


Lymphocytes # (Auto)


 


 


 1.9 x10^3/uL


(1.0-4.8) 





 


Monocytes # (Auto)


 


 


 0.5 x10^3/uL


(0.0-1.1) 





 


Eosinophils # (Auto)


 


 


 0.1 x10^3/uL


(0.0-0.7) 





 


Basophils # (Auto)


 


 


 0.0 x10^3/uL


(0.0-0.2) 





 


Test


 5/4/20


18:17 


 


 





 


Glucose (Fingerstick)


 145 mg/dL


(70-99) 


 


 














Assessment and Plan


Assessmemt and Plan


Problems


Medical Problems:


(1) Acute pancreatitis


Status: Acute  





(2) Cholelithiasis


Status: Acute  











Comment


Review of Relevant


I have reviewed the following items josy (where applicable) has been applied.


Labs





Laboratory Tests








Test


 5/3/20


00:03 5/3/20


05:25 5/3/20


05:30 5/3/20


07:55


 


Glucose (Fingerstick)


 122 mg/dL


(70-99) 113 mg/dL


(70-99) 


 





 


White Blood Count


 


 


 9.7 x10^3/uL


(4.0-11.0) 





 


Red Blood Count


 


 


 2.44 x10^6/uL


(3.50-5.40) 





 


Hemoglobin


 


 


 7.1 g/dL


(12.0-15.5) 





 


Hematocrit


 


 


 22.8 %


(36.0-47.0) 





 


Mean Corpuscular Volume   93 fL ()  


 


Mean Corpuscular Hemoglobin   29 pg (25-35)  


 


Mean Corpuscular Hemoglobin


Concent 


 


 31 g/dL


(31-37) 





 


Red Cell Distribution Width


 


 


 18.1 %


(11.5-14.5) 





 


Platelet Count


 


 


 408 x10^3/uL


(140-400) 





 


Neutrophils (%) (Auto)   79 % (31-73)  


 


Lymphocytes (%) (Auto)   14 % (24-48)  


 


Monocytes (%) (Auto)   5 % (0-9)  


 


Eosinophils (%) (Auto)   2 % (0-3)  


 


Basophils (%) (Auto)   0 % (0-3)  


 


Neutrophils # (Auto)


 


 


 7.7 x10^3/uL


(1.8-7.7) 





 


Lymphocytes # (Auto)


 


 


 1.4 x10^3/uL


(1.0-4.8) 





 


Monocytes # (Auto)


 


 


 0.5 x10^3/uL


(0.0-1.1) 





 


Eosinophils # (Auto)


 


 


 0.2 x10^3/uL


(0.0-0.7) 





 


Basophils # (Auto)


 


 


 0.0 x10^3/uL


(0.0-0.2) 





 


Sodium Level


 


 


 153 mmol/L


(136-145) 





 


Potassium Level


 


 


 4.3 mmol/L


(3.5-5.1) 





 


Chloride Level


 


 


 114 mmol/L


() 





 


Carbon Dioxide Level


 


 


 38 mmol/L


(21-32) 





 


Anion Gap   1 (6-14)  


 


Blood Urea Nitrogen


 


 


 47 mg/dL


(7-20) 





 


Creatinine


 


 


 1.0 mg/dL


(0.6-1.0) 





 


Estimated GFR


(Cockcroft-Gault) 


 


 58.9 


 





 


Glucose Level


 


 


 125 mg/dL


(70-99) 





 


Calcium Level


 


 


 8.7 mg/dL


(8.5-10.1) 





 


O2 Saturation    97 % (92-99) 


 


Arterial Blood pH


 


 


 


 7.27


(7.35-7.45)


 


Arterial Blood pCO2 at


Patient Temp 


 


 


 75 mmHg


(35-46)


 


Arterial Blood pO2 at Patient


Temp 


 


 


 123 mmHg


()


 


Arterial Blood HCO3


 


 


 


 34 mmol/L


(21-28)


 


Arterial Blood Base Excess


 


 


 


 6 mmol/L


(-3-3)


 


Oxyhemoglobin    96.8 % 


 


Methemoglobin


 


 


 


 0.5 %


(0.0-1.9)


 


Carbon Monoxide, Quantitative


 


 


 


 0.1 %


(0.0-1.9)


 


FiO2    40 


 


Test


 5/3/20


12:47 5/3/20


16:09 5/3/20


17:57 5/4/20


06:10


 


Glucose (Fingerstick)


 126 mg/dL


(70-99) 


 148 mg/dL


(70-99) 





 


O2 Saturation  93 % (92-99)   


 


Arterial Blood pH


 


 7.47


(7.35-7.45) 


 





 


Arterial Blood pCO2 at


Patient Temp 


 49 mmHg


(35-46) 


 





 


Arterial Blood pO2 at Patient


Temp 


 73 mmHg


() 


 





 


Arterial Blood HCO3


 


 35 mmol/L


(21-28) 


 





 


Arterial Blood Base Excess


 


 10 mmol/L


(-3-3) 


 





 


Oxyhemoglobin  92.7 %   


 


Methemoglobin


 


 0.4 %


(0.0-1.9) 


 





 


Carbon Monoxide, Quantitative


 


 0.3 %


(0.0-1.9) 


 





 


FiO2  30   


 


Sodium Level


 


 


 


 154 mmol/L


(136-145)


 


Potassium Level


 


 


 


 4.0 mmol/L


(3.5-5.1)


 


Chloride Level


 


 


 


 114 mmol/L


()


 


Carbon Dioxide Level


 


 


 


 33 mmol/L


(21-32)


 


Anion Gap    7 (6-14) 


 


Blood Urea Nitrogen


 


 


 


 45 mg/dL


(7-20)


 


Creatinine


 


 


 


 0.9 mg/dL


(0.6-1.0)


 


Estimated GFR


(Cockcroft-Gault) 


 


 


 66.5 





 


BUN/Creatinine Ratio    50 (6-20) 


 


Glucose Level


 


 


 


 164 mg/dL


(70-99)


 


Calcium Level


 


 


 


 8.8 mg/dL


(8.5-10.1)


 


Phosphorus Level


 


 


 


 3.4 mg/dL


(2.6-4.7)


 


Magnesium Level


 


 


 


 2.2 mg/dL


(1.8-2.4)


 


Total Bilirubin


 


 


 


 0.5 mg/dL


(0.2-1.0)


 


Aspartate Amino Transf


(AST/SGOT) 


 


 


 39 U/L (15-37) 





 


Alanine Aminotransferase


(ALT/SGPT) 


 


 


 36 U/L (14-59) 





 


Alkaline Phosphatase


 


 


 


 127 U/L


()


 


Total Protein


 


 


 


 5.6 g/dL


(6.4-8.2)


 


Albumin


 


 


 


 1.8 g/dL


(3.4-5.0)


 


Albumin/Globulin Ratio    0.5 (1.0-1.7) 


 


Triglycerides Level


 


 


 


 174 mg/dL


(0-150)


 


Test


 5/4/20


06:15 5/4/20


10:30 5/4/20


11:58 5/4/20


18:17


 


Glucose (Fingerstick)


 153 mg/dL


(70-99) 


 182 mg/dL


(70-99) 145 mg/dL


(70-99)


 


White Blood Count


 


 9.9 x10^3/uL


(4.0-11.0) 


 





 


Red Blood Count


 


 2.49 x10^6/uL


(3.50-5.40) 


 





 


Hemoglobin


 


 7.1 g/dL


(12.0-15.5) 


 





 


Hematocrit


 


 22.7 %


(36.0-47.0) 


 





 


Mean Corpuscular Volume  91 fL ()   


 


Mean Corpuscular Hemoglobin  29 pg (25-35)   


 


Mean Corpuscular Hemoglobin


Concent 


 31 g/dL


(31-37) 


 





 


Red Cell Distribution Width


 


 18.9 %


(11.5-14.5) 


 





 


Platelet Count


 


 458 x10^3/uL


(140-400) 


 





 


Neutrophils (%) (Auto)  74 % (31-73)   


 


Lymphocytes (%) (Auto)  19 % (24-48)   


 


Monocytes (%) (Auto)  5 % (0-9)   


 


Eosinophils (%) (Auto)  2 % (0-3)   


 


Basophils (%) (Auto)  0 % (0-3)   


 


Neutrophils # (Auto)


 


 7.3 x10^3/uL


(1.8-7.7) 


 





 


Lymphocytes # (Auto)


 


 1.9 x10^3/uL


(1.0-4.8) 


 





 


Monocytes # (Auto)


 


 0.5 x10^3/uL


(0.0-1.1) 


 





 


Eosinophils # (Auto)


 


 0.1 x10^3/uL


(0.0-0.7) 


 





 


Basophils # (Auto)


 


 0.0 x10^3/uL


(0.0-0.2) 


 











Laboratory Tests








Test


 5/4/20


06:10 5/4/20


06:15 5/4/20


10:30 5/4/20


11:58


 


Sodium Level


 154 mmol/L


(136-145) 


 


 





 


Potassium Level


 4.0 mmol/L


(3.5-5.1) 


 


 





 


Chloride Level


 114 mmol/L


() 


 


 





 


Carbon Dioxide Level


 33 mmol/L


(21-32) 


 


 





 


Anion Gap 7 (6-14)    


 


Blood Urea Nitrogen


 45 mg/dL


(7-20) 


 


 





 


Creatinine


 0.9 mg/dL


(0.6-1.0) 


 


 





 


Estimated GFR


(Cockcroft-Gault) 66.5 


 


 


 





 


BUN/Creatinine Ratio 50 (6-20)    


 


Glucose Level


 164 mg/dL


(70-99) 


 


 





 


Calcium Level


 8.8 mg/dL


(8.5-10.1) 


 


 





 


Phosphorus Level


 3.4 mg/dL


(2.6-4.7) 


 


 





 


Magnesium Level


 2.2 mg/dL


(1.8-2.4) 


 


 





 


Total Bilirubin


 0.5 mg/dL


(0.2-1.0) 


 


 





 


Aspartate Amino Transf


(AST/SGOT) 39 U/L (15-37) 


 


 


 





 


Alanine Aminotransferase


(ALT/SGPT) 36 U/L (14-59) 


 


 


 





 


Alkaline Phosphatase


 127 U/L


() 


 


 





 


Total Protein


 5.6 g/dL


(6.4-8.2) 


 


 





 


Albumin


 1.8 g/dL


(3.4-5.0) 


 


 





 


Albumin/Globulin Ratio 0.5 (1.0-1.7)    


 


Triglycerides Level


 174 mg/dL


(0-150) 


 


 





 


Glucose (Fingerstick)


 


 153 mg/dL


(70-99) 


 182 mg/dL


(70-99)


 


White Blood Count


 


 


 9.9 x10^3/uL


(4.0-11.0) 





 


Red Blood Count


 


 


 2.49 x10^6/uL


(3.50-5.40) 





 


Hemoglobin


 


 


 7.1 g/dL


(12.0-15.5) 





 


Hematocrit


 


 


 22.7 %


(36.0-47.0) 





 


Mean Corpuscular Volume   91 fL ()  


 


Mean Corpuscular Hemoglobin   29 pg (25-35)  


 


Mean Corpuscular Hemoglobin


Concent 


 


 31 g/dL


(31-37) 





 


Red Cell Distribution Width


 


 


 18.9 %


(11.5-14.5) 





 


Platelet Count


 


 


 458 x10^3/uL


(140-400) 





 


Neutrophils (%) (Auto)   74 % (31-73)  


 


Lymphocytes (%) (Auto)   19 % (24-48)  


 


Monocytes (%) (Auto)   5 % (0-9)  


 


Eosinophils (%) (Auto)   2 % (0-3)  


 


Basophils (%) (Auto)   0 % (0-3)  


 


Neutrophils # (Auto)


 


 


 7.3 x10^3/uL


(1.8-7.7) 





 


Lymphocytes # (Auto)


 


 


 1.9 x10^3/uL


(1.0-4.8) 





 


Monocytes # (Auto)


 


 


 0.5 x10^3/uL


(0.0-1.1) 





 


Eosinophils # (Auto)


 


 


 0.1 x10^3/uL


(0.0-0.7) 





 


Basophils # (Auto)


 


 


 0.0 x10^3/uL


(0.0-0.2) 





 


Test


 5/4/20


18:17 


 


 





 


Glucose (Fingerstick)


 145 mg/dL


(70-99) 


 


 











Microbiology


4/30/20 Aerobic Culture - Final, Complete


          


4/30/20 Aerobic Culture Result 1 (ANSON) - Final, Complete


          


4/30/20 Gram Stain - Final, Complete


          


4/30/20 Gram Stain Result 1 (ANSON) - Final, Complete


          


4/30/20 Gram Stain Result 2 (ANSON) - Final, Complete


          


4/29/20 Blood Culture - Final, Complete


          NO GROWTH AFTER 5 DAYS


4/27/20 Aerobic and Anaerobic Culture - Final, Complete


          


4/27/20 Anaerobic Culture Result 1 (ANSON) - Final, Complete


          


4/27/20 Aerobic Culture - Final, Complete


          


4/27/20 Aerobic Culture Result 1 (ANSON) - Final, Complete


          


4/27/20 Gram Stain - Final, Complete


          


4/27/20 Gram Stain Result 1 (ANSON) - Final, Complete


          


4/27/20 Gram Stain Result 2 (ANSON) - Final, Complete


          


4/12/20 Urine Culture - Final, Complete


          


4/12/20 Urine Culture Result 1 (ANSON) - Final, Complete


Medications





Current Medications


Sodium Chloride 1,000 ml @  1,000 mls/hr Q1H IV  Last administered on 3/16/20at 

03:00;  Start 3/16/20 at 03:00;  Stop 3/16/20 at 03:59;  Status DC


Ondansetron HCl (Zofran) 4 mg 1X  ONCE IVP  Last administered on 3/16/20at 

03:27;  Start 3/16/20 at 03:00;  Stop 3/16/20 at 03:01;  Status DC


Morphine Sulfate (Morphine Sulfate) 4 mg 1X  ONCE IV ;  Start 3/16/20 at 03:00; 

Stop 3/16/20 at 03:01;  Status Cancel


Ketorolac Tromethamine (Toradol 30mg Vial) 30 mg 1X  ONCE IV  Last administered 

on 3/16/20at 02:54;  Start 3/16/20 at 03:00;  Stop 3/16/20 at 03:01;  Status DC


Fentanyl Citrate (Fentanyl 2ml Vial) 25 mcg 1X  ONCE IVP  Last administered on 

3/16/20at 03:23;  Start 3/16/20 at 03:30;  Stop 3/16/20 at 03:31;  Status DC


Fentanyl Citrate (Fentanyl 2ml Vial) 100 mcg STK-MED ONCE .ROUTE ;  Start 

3/16/20 at 03:18;  Stop 3/16/20 at 03:18;  Status DC


Iohexol (Omnipaque 350 Mg/ml) 90 ml 1X  ONCE IV  Last administered on 3/16/20at 

03:25;  Start 3/16/20 at 03:30;  Stop 3/16/20 at 03:31;  Status DC


Info (CONTRAST GIVEN -- Rx MONITORING) 1 each PRN DAILY  PRN MC SEE COMMENTS;  

Start 3/16/20 at 03:30;  Stop 3/18/20 at 03:29;  Status DC


Hydromorphone HCl (Dilaudid) 0.5 mg 1X  ONCE IV  Last administered on 3/16/20at 

03:55;  Start 3/16/20 at 04:30;  Stop 3/16/20 at 04:32;  Status DC


Ondansetron HCl (Zofran) 4 mg PRN Q8HRS  PRN IV NAUSEA/VOMITING 1ST CHOICE;  

Start 3/16/20 at 05:00;  Stop 3/16/20 at 09:27;  Status DC


Morphine Sulfate (Morphine Sulfate) 2 mg PRN Q2HR  PRN IV SEVERE PAIN 7-10 Last 

administered on 3/17/20at 12:26;  Start 3/16/20 at 05:00;  Stop 3/17/20 at 

14:15;  Status DC


Sodium Chloride 1,000 ml @  125 mls/hr Q8H IV  Last administered on 3/16/20at 

20:56;  Start 3/16/20 at 05:00;  Stop 3/17/20 at 04:59;  Status DC


Hydromorphone HCl (Dilaudid) 0.5 mg PRN Q3HRS  PRN IV SEVERE PAIN 7-10 Last 

administered on 3/17/20at 10:06;  Start 3/16/20 at 05:00;  Stop 3/17/20 at 

12:01;  Status DC


Piperacillin Sod/ Tazobactam Sod 4.5 gm/Sodium Chloride 100 ml @  200 mls/hr 1X 

ONCE IV  Last administered on 3/16/20at 05:44;  Start 3/16/20 at 06:00;  Stop 

3/16/20 at 06:29;  Status DC


Ondansetron HCl (Zofran) 4 mg PRN Q4HRS  PRN IV NAUSEA/VOMITING 1ST CHOICE Last 

administered on 5/4/20at 11:59;  Start 3/16/20 at 09:30


Insulin Human Lispro (HumaLOG) 0-9 UNITS Q6HRS SQ  Last administered on 

4/30/20at 17:29;  Start 3/16/20 at 09:30


Dextrose (Dextrose 50%-Water Syringe) 12.5 gm PRN Q15MIN  PRN IV SEE COMMENTS;  

Start 3/16/20 at 09:30


Pantoprazole Sodium (PROTONIX VIAL for IV PUSH) 40 mg DAILYAC IVP  Last ad

ministered on 5/4/20at 10:48;  Start 3/16/20 at 11:30


Prochlorperazine Edisylate (Compazine) 10 mg PRN Q6HRS  PRN IV NAUSEA/VOMITING, 

2nd CHOICE Last administered on 5/4/20at 06:06;  Start 3/16/20 at 17:45


Atenolol (Tenormin) 100 mg DAILY PO ;  Start 3/17/20 at 09:00;  Stop 3/16/20 at 

20:08;  Status DC


Metoprolol Tartrate (Lopressor Vial) 2.5 mg Q6HRS IVP  Last administered on 3/17

/20at 05:51;  Start 3/16/20 at 20:15;  Stop 3/17/20 at 10:02;  Status DC


Metoprolol Tartrate (Lopressor Vial) 5 mg Q6HRS IVP  Last administered on 

3/26/20at 00:12;  Start 3/17/20 at 10:15;  Stop 3/28/20 at 08:48;  Status DC


Hydromorphone HCl (Dilaudid) 1 mg PRN Q3HRS  PRN IV SEVERE PAIN 7-10 Last 

administered on 3/23/20at 05:13;  Start 3/17/20 at 12:00;  Stop 3/31/20 at 

00:25;  Status DC


Lidocaine HCl (Buffered Lidocaine 1%) 3 ml STK-MED ONCE .ROUTE ;  Start 3/17/20 

at 12:55;  Stop 3/17/20 at 12:56;  Status DC


Albumin Human 500 ml @  125 mls/hr 1X  ONCE IV  Last administered on 3/17/20at 

14:33;  Start 3/17/20 at 14:30;  Stop 3/17/20 at 18:32;  Status DC


Norepinephrine Bitartrate 8 mg/ Dextrose 258 ml @  17.299 mls/ hr CONT  PRN IV 

PER PROTOCOL Last administered on 4/14/20at 12:48;  Start 3/17/20 at 15:30;  

Stop 4/17/20 at 09:19;  Status DC


Sodium Chloride 1,000 ml @  125 mls/hr Q8H IV  Last administered on 3/17/20at 

21:04;  Start 3/17/20 at 16:00;  Stop 3/18/20 at 02:42;  Status DC


Albumin Human 500 ml @  125 mls/hr PRN BID  PRN IV After every 2L NSS & BP < 

90mm Last administered on 4/1/20at 14:21;  Start 3/17/20 at 16:00


Iohexol (Omnipaque 300 Mg/ml) 60 ml 1X  ONCE IV  Last administered on 3/17/20at 

17:20;  Start 3/17/20 at 17:00;  Stop 3/17/20 at 17:01;  Status DC


Info (CONTRAST GIVEN -- Rx MONITORING) 1 each PRN DAILY  PRN MC SEE COMMENTS;  

Start 3/17/20 at 17:00;  Stop 3/19/20 at 16:59;  Status DC


Meropenem 1 gm/ Sodium Chloride 100 ml @  200 mls/hr Q8HRS IV  Last administered

on 3/18/20at 05:45;  Start 3/17/20 at 20:00;  Stop 3/18/20 at 08:48;  Status DC


Furosemide (Lasix) 40 mg 1X  ONCE IVP  Last administered on 3/17/20at 22:12;  

Start 3/17/20 at 22:30;  Stop 3/17/20 at 22:31;  Status DC


Calcium Chloride 1000 mg/Sodium Chloride 110 ml @  220 mls/hr 1X  ONCE IV  Last 

administered on 3/17/20at 22:11;  Start 3/17/20 at 22:30;  Stop 3/17/20 at 

22:59;  Status DC


Albuterol Sulfate (Ventolin Neb Soln) 2.5 mg 1X  ONCE NEB  Last administered on 

3/18/20at 00:56;  Start 3/17/20 at 22:30;  Stop 3/17/20 at 22:31;  Status DC


Insulin Human Regular (HumuLIN R VIAL) 5 unit 1X  ONCE IV  Last administered on 

3/17/20at 22:14;  Start 3/17/20 at 22:30;  Stop 3/17/20 at 22:31;  Status DC


Magnesium Sulfate 50 ml @ 25 mls/hr 1X  ONCE IV  Last administered on 3/18/20at 

02:57;  Start 3/18/20 at 03:00;  Stop 3/18/20 at 04:59;  Status DC


Calcium Gluconate 1000 mg/Sodium Chloride 110 ml @  220 mls/hr 1X  ONCE IV  Last

administered on 3/18/20at 02:46;  Start 3/18/20 at 03:00;  Stop 3/18/20 at 

03:29;  Status DC


Sodium Chloride 1,000 ml @  200 mls/hr Q5H IV  Last administered on 3/18/20at 

02:46;  Start 3/18/20 at 03:00;  Stop 3/18/20 at 10:21;  Status DC


Calcium Gluconate 1000 mg/Sodium Chloride 110 ml @  220 mls/hr 1X  ONCE IV  Last

administered on 3/18/20at 03:21;  Start 3/18/20 at 03:30;  Stop 3/18/20 at 

03:59;  Status DC


Sodium Bicarbonate 50 meq/Sodium Chloride 1,050 ml @  75 mls/hr Q14H IV  Last 

administered on 3/22/20at 21:10;  Start 3/18/20 at 07:30;  Stop 3/23/20 at 

10:28;  Status DC


Calcium Gluconate 2000 mg/Sodium Chloride 120 ml @  220 mls/hr 1X  ONCE IV  Last

administered on 3/18/20at 09:05;  Start 3/18/20 at 07:30;  Stop 3/18/20 at 

08:02;  Status DC


Lidocaine HCl (Xylocaine-Mpf 1% 2ml Vial) 2 ml STK-MED ONCE .ROUTE ;  Start 

3/18/20 at 08:47;  Stop 3/18/20 at 08:47;  Status DC


Meropenem 500 mg/ Sodium Chloride 50 ml @  100 mls/hr Q12HR IV  Last 

administered on 3/23/20at 21:01;  Start 3/18/20 at 18:00;  Stop 3/24/20 at 

07:58;  Status DC


Lidocaine HCl (Buffered Lidocaine 1%) 3 ml STK-MED ONCE .ROUTE ;  Start 3/18/20 

at 09:46;  Stop 3/18/20 at 09:46;  Status DC


Lidocaine HCl (Buffered Lidocaine 1%) 6 ml 1X  ONCE INJ  Last administered on 

3/18/20at 10:26;  Start 3/18/20 at 10:15;  Stop 3/18/20 at 10:16;  Status DC


Info (Tpn Per Pharmacy) 1 each PRN DAILY  PRN MC SEE COMMENTS Last administered 

on 5/4/20at 13:52;  Start 3/18/20 at 12:00


Sodium Chloride 1,000 ml @  1,000 mls/hr Q1H PRN IV hypotension;  Start 3/18/20 

at 12:07;  Stop 3/18/20 at 18:06;  Status DC


Diphenhydramine HCl (Benadryl) 25 mg 1X PRN  PRN IV ITCHING;  Start 3/18/20 at 

12:15;  Stop 3/19/20 at 12:14;  Status DC


Diphenhydramine HCl (Benadryl) 25 mg 1X PRN  PRN IV ITCHING;  Start 3/18/20 at 

12:15;  Stop 3/19/20 at 12:14;  Status DC


Sodium Chloride 1,000 ml @  400 mls/hr Q2H30M PRN IV PATENCY;  Start 3/18/20 at 

12:07;  Stop 3/19/20 at 00:06;  Status DC


Info (PHARMACY MONITORING -- do not chart) 1 each PRN DAILY  PRN MC SEE 

COMMENTS;  Start 3/18/20 at 12:15;  Stop 3/20/20 at 08:13;  Status DC


Sodium Chloride 90 meq/Calcium Gluconate 10 meq/ Multivitamins 10 ml/Chromium/ 

Copper/Manganese/ Seleni/Zn 1 ml/ Total Parenteral Nutrition/Amino 

Acids/Dextrose/ Fat Emulsion Intravenous 55.005 ml  @ 2.292 mls/hr TPN  CONT IV 

;  Start 3/18/20 at 22:00;  Stop 3/18/20 at 12:33;  Status DC


Info (Tpn Per Pharmacy) 1 each PRN DAILY  PRN MC SEE COMMENTS;  Start 3/18/20 at

12:30;  Status UNV


Sodium Chloride 90 meq/Calcium Gluconate 10 meq/ Multivitamins 10 ml/Chromium/ 

Copper/Manganese/ Seleni/Zn 0.5 ml/ Total Parenteral Nutrition/Amino 

Acids/Dextrose/ Fat Emulsion Intravenous 1,512 ml @  63 mls/hr TPN  CONT IV  

Last administered on 3/18/20at 22:06;  Start 3/18/20 at 22:00;  Stop 3/19/20 at 

21:59;  Status DC


Calcium Carbonate/ Glycine (Tums) 500 mg PRN AFTMEALHC  PRN PO INDIGESTION;  

Start 3/18/20 at 17:45


Calcium Gluconate (Calcium Gluconate) 2,000 mg 1X  ONCE IVP  Last administered 

on 3/19/20at 02:19;  Start 3/19/20 at 02:15;  Stop 3/19/20 at 02:16;  Status DC


Calcium Chloride 3000 mg/Sodium Chloride 1,030 ml @  50 mls/hr [id number] IV  Last 

administered on 3/21/20at 02:17;  Start 3/19/20 at 08:00;  Stop 3/21/20 at 

15:23;  Status DC


Lorazepam (Ativan Inj) 1 mg PRN Q4HRS  PRN IVP ANXIETY / AGITATION, 2nd choic 

Last administered on 4/17/20at 03:51;  Start 3/19/20 at 09:00;  Stop 4/17/20 at 

09:19;  Status DC


Sodium Chloride 1,000 ml @  1,000 mls/hr Q1H PRN IV hypotension;  Start 3/19/20 

at 08:56;  Stop 3/19/20 at 14:55;  Status DC


Albumin Human 200 ml @  200 mls/hr 1X PRN  PRN IV Hypotension;  Start 3/19/20 at

09:00;  Stop 3/19/20 at 14:59;  Status DC


Diphenhydramine HCl (Benadryl) 25 mg 1X PRN  PRN IV ITCHING;  Start 3/19/20 at 

09:00;  Stop 3/20/20 at 08:59;  Status DC


Diphenhydramine HCl (Benadryl) 25 mg 1X PRN  PRN IV ITCHING;  Start 3/19/20 at 

09:00;  Stop 3/20/20 at 08:59;  Status DC


Sodium Chloride 1,000 ml @  400 mls/hr Q2H30M PRN IV PATENCY;  Start 3/19/20 at 

08:56;  Stop 3/19/20 at 20:55;  Status DC


Info (PHARMACY MONITORING -- do not chart) 1 each PRN DAILY  PRN MC SEE COMME

NTS;  Start 3/19/20 at 09:00;  Status UNV


Info (PHARMACY MONITORING -- do not chart) 1 each PRN DAILY  PRN MC SEE 

COMMENTS;  Start 3/19/20 at 09:00;  Stop 3/20/20 at 08:13;  Status DC


Digoxin (Lanoxin) 500 mcg 1X  ONCE IV  Last administered on 3/19/20at 10:04;  

Start 3/19/20 at 10:00;  Stop 3/19/20 at 10:01;  Status DC


Digoxin (Lanoxin) 125 mcg 1X  ONCE IV  Last administered on 3/19/20at 17:10;  

Start 3/19/20 at 18:00;  Stop 3/19/20 at 18:01;  Status DC


Magnesium Sulfate 100 ml @  25 mls/hr 1X  ONCE IV  Last administered on 

3/19/20at 12:48;  Start 3/19/20 at 13:00;  Stop 3/19/20 at 16:59;  Status DC


Sodium Chloride 90 meq/Magnesium Sulfate 10 meq/ Calcium Gluconate 20 meq/ 

Multivitamins 10 ml/Chromium/ Copper/Manganese/ Seleni/Zn 0.5 ml/ Total 

Parenteral Nutrition/Amino Acids/Dextrose/ Fat Emulsion Intravenous 1,512 ml @  

63 mls/hr TPN  CONT IV  Last administered on 3/19/20at 22:25;  Start 3/19/20 at 

22:00;  Stop 3/20/20 at 21:59;  Status DC


Sodium Chloride 1,000 ml @  1,000 mls/hr Q1H PRN IV hypotension;  Start 3/20/20 

at 08:05;  Stop 3/20/20 at 14:04;  Status DC


Albumin Human 200 ml @  200 mls/hr 1X  ONCE IV  Last administered on 3/20/20at 

08:57;  Start 3/20/20 at 08:15;  Stop 3/20/20 at 09:14;  Status DC


Diphenhydramine HCl (Benadryl) 25 mg 1X PRN  PRN IV ITCHING;  Start 3/20/20 at 

08:15;  Stop 3/21/20 at 08:14;  Status DC


Diphenhydramine HCl (Benadryl) 25 mg 1X PRN  PRN IV ITCHING;  Start 3/20/20 at 

08:15;  Stop 3/21/20 at 08:14;  Status DC


Sodium Chloride 1,000 ml @  400 mls/hr Q2H30M PRN IV PATENCY;  Start 3/20/20 at 

08:05;  Stop 3/20/20 at 20:04;  Status DC


Info (PHARMACY MONITORING -- do not chart) 1 each PRN DAILY  PRN MC SEE C

OMMENTS;  Start 3/20/20 at 08:15;  Stop 3/24/20 at 07:57;  Status DC


Sodium Chloride 90 meq/Potassium Chloride 15 meq/ Potassium Phosphate 10 mmol/ 

Magnesium Sulfate 10 meq/Calcium Gluconate 20 meq/ Multivitamins 10 ml/Chromium/

Copper/Manganese/ Seleni/Zn 0.5 ml/ Total Parenteral Nutrition/Amino 

Acids/Dextrose/ Fat Emulsion Intravenous 1,512 ml @  63 mls/hr TPN  CONT IV  

Last administered on 3/20/20at 21:01;  Start 3/20/20 at 22:00;  Stop 3/21/20 at 

21:59;  Status DC


Potassium Chloride/Water 100 ml @  100 mls/hr 1X  ONCE IV  Last administered on 

3/20/20at 14:09;  Start 3/20/20 at 14:00;  Stop 3/20/20 at 14:59;  Status DC


Benzocaine (Hurricaine One) 1 spray 1X  ONCE MM  Last administered on 3/20/20at 

16:38;  Start 3/20/20 at 14:30;  Stop 3/20/20 at 14:31;  Status DC


Lidocaine HCl (Glydo (Lidocaine) Jelly) 1 thomas 1X  ONCE MM  Last administered on 

3/20/20at 16:38;  Start 3/20/20 at 14:30;  Stop 3/20/20 at 14:31;  Status DC


Linezolid/Dextrose 300 ml @  300 mls/hr Q12HR IV  Last administered on 3/26/20at

21:04;  Start 3/20/20 at 20:00;  Stop 3/27/20 at 07:50;  Status DC


Acetaminophen (Tylenol) 650 mg PRN Q6HRS  PRN PO MILD PAIN / TEMP;  Start 

3/21/20 at 03:30;  Stop 3/21/20 at 03:36;  Status DC


Acetaminophen (Tylenol) 650 mg PRN Q6HRS  PRN PEG MILD PAIN / TEMP Last 

administered on 4/16/20at 19:56;  Start 3/21/20 at 03:36


Sodium Chloride 1,000 ml @  1,000 mls/hr Q1H PRN IV hypotension;  Start 3/21/20 

at 07:50;  Stop 3/21/20 at 13:49;  Status DC


Albumin Human 200 ml @  200 mls/hr 1X PRN  PRN IV Hypotension;  Start 3/21/20 at

08:00;  Stop 3/21/20 at 13:59;  Status DC


Sodium Chloride (Normal Saline Flush) 10 ml 1X PRN  PRN IV AP catheter pack;  

Start 3/21/20 at 08:00;  Stop 3/22/20 at 07:59;  Status DC


Sodium Chloride (Normal Saline Flush) 10 ml 1X PRN  PRN IV  catheter pack;  

Start 3/21/20 at 08:00;  Stop 3/22/20 at 07:59;  Status DC


Sodium Chloride 1,000 ml @  400 mls/hr Q2H30M PRN IV PATENCY;  Start 3/21/20 at 

07:50;  Stop 3/21/20 at 19:49;  Status DC


Info (PHARMACY MONITORING -- do not chart) 1 each PRN DAILY  PRN MC SEE 

COMMENTS;  Start 3/21/20 at 08:00;  Status UNV


Info (PHARMACY MONITORING -- do not chart) 1 each PRN DAILY  PRN MC SEE COM

MENTS;  Start 3/21/20 at 08:00;  Stop 3/23/20 at 08:25;  Status DC


Sodium Chloride 90 meq/Potassium Chloride 15 meq/ Potassium Phosphate 10 mmol/ 

Magnesium Sulfate 10 meq/Calcium Gluconate 20 meq/ Multivitamins 10 ml/Chromium/

Copper/Manganese/ Seleni/Zn 0.5 ml/ Total Parenteral Nutrition/Amino 

Acids/Dextrose/ Fat Emulsion Intravenous 1,512 ml @  63 mls/hr TPN  CONT IV  

Last administered on 3/21/20at 20:57;  Start 3/21/20 at 22:00;  Stop 3/22/20 at 

21:59;  Status DC


Sodium Chloride 90 meq/Potassium Chloride 15 meq/ Potassium Phosphate 15 mmol/ 

Magnesium Sulfate 10 meq/Calcium Gluconate 20 meq/ Multivitamins 10 ml/Chromium/

Copper/Manganese/ Seleni/Zn 0.5 ml/ Total Parenteral Nutrition/Amino 

Acids/Dextrose/ Fat Emulsion Intravenous 1,512 ml @  63 mls/hr TPN  CONT IV ;  

Start 3/22/20 at 22:00;  Stop 3/22/20 at 14:16;  Status DC


Sodium Chloride 90 meq/Potassium Chloride 15 meq/ Potassium Phosphate 15 mmol/ 

Magnesium Sulfate 10 meq/Calcium Gluconate 20 meq/ Multivitamins 10 ml/Chromium/

Copper/Manganese/ Seleni/Zn 0.5 ml/ Total Parenteral Nutrition/Amino 

Acids/Dextrose/ Fat Emulsion Intravenous 1,200 ml @  50 mls/hr TPN  CONT IV ;  

Start 3/22/20 at 22:00;  Stop 3/22/20 at 14:17;  Status DC


Sodium Chloride 90 meq/Potassium Chloride 15 meq/ Potassium Phosphate 10 mmol/ 

Magnesium Sulfate 10 meq/Calcium Gluconate 20 meq/ Multivitamins 10 ml/Chromium/

Copper/Manganese/ Seleni/Zn 0.5 ml/ Total Parenteral Nutrition/Amino 

Acids/Dextrose/ Fat Emulsion Intravenous 1,200 ml @  50 mls/hr TPN  CONT IV  

Last administered on 3/22/20at 23:29;  Start 3/22/20 at 22:00;  Stop 3/23/20 at 

21:59;  Status DC


Sodium Chloride 1,000 ml @  1,000 mls/hr Q1H PRN IV hypotension;  Start 3/23/20 

at 07:28;  Stop 3/23/20 at 13:27;  Status DC


Albumin Human 200 ml @  200 mls/hr 1X  ONCE IV  Last administered on 3/23/20at 

08:51;  Start 3/23/20 at 07:30;  Stop 3/23/20 at 08:29;  Status DC


Diphenhydramine HCl (Benadryl) 25 mg 1X PRN  PRN IV ITCHING;  Start 3/23/20 at 

07:30;  Stop 3/24/20 at 07:29;  Status DC


Diphenhydramine HCl (Benadryl) 25 mg 1X PRN  PRN IV ITCHING;  Start 3/23/20 at 

07:30;  Stop 3/24/20 at 07:29;  Status DC


Sodium Chloride 1,000 ml @  400 mls/hr Q2H30M PRN IV PATENCY;  Start 3/23/20 at 

07:28;  Stop 3/23/20 at 19:27;  Status DC


Info (PHARMACY MONITORING -- do not chart) 1 each PRN DAILY  PRN MC SEE 

COMMENTS;  Start 3/23/20 at 07:30;  Stop 4/3/20 at 13:01;  Status DC


Metronidazole 100 ml @  100 mls/hr Q6HRS IV  Last administered on 4/8/20at 

06:26;  Start 3/23/20 at 08:30;  Stop 4/8/20 at 09:58;  Status DC


Micafungin Sodium 100 mg/Dextrose 100 ml @  100 mls/hr Q24H IV  Last 

administered on 4/30/20at 08:18;  Start 3/23/20 at 09:00;  Stop 4/30/20 at 

20:58;  Status DC


Propofol 0 ml @ As Directed STK-MED ONCE IV ;  Start 3/23/20 at 07:53;  Stop 

3/23/20 at 07:53;  Status DC


Etomidate (Amidate) 20 mg STK-MED ONCE IV ;  Start 3/23/20 at 07:53;  Stop 

3/23/20 at 07:54;  Status DC


Midazolam HCl (Versed) 5 mg STK-MED ONCE .ROUTE ;  Start 3/23/20 at 07:57;  Stop

3/23/20 at 07:57;  Status DC


Fentanyl Citrate 30 ml @ 0 mls/hr CONT  PRN IV SEE PROTOCOL Last administered on

4/17/20at 06:12;  Start 3/23/20 at 08:15;  Stop 4/17/20 at 09:19;  Status DC


Artificial Tears (Artificial Tears) 1 drop PRN Q1HR  PRN OU DRY EYE, 1st choice;

 Start 3/23/20 at 08:15;  Stop 4/29/20 at 05:31;  Status DC


Midazolam HCl 50 mg/Sodium Chloride 50 ml @ 0 mls/hr CONT  PRN IV SEE PROTOCOL 

Last administered on 3/26/20at 22:39;  Start 3/23/20 at 08:15;  Stop 3/28/20 at 

15:59;  Status DC


Etomidate (Amidate) 8 mg 1X  ONCE IV  Last administered on 3/23/20at 08:33;  

Start 3/23/20 at 08:30;  Stop 3/23/20 at 08:31;  Status DC


Succinylcholine Chloride (Anectine) 120 mg 1X  ONCE IV  Last administered on 

3/23/20at 08:34;  Start 3/23/20 at 08:30;  Stop 3/23/20 at 08:31;  Status DC


Midazolam HCl (Versed) 5 mg 1X  ONCE IV ;  Start 3/23/20 at 08:30;  Stop 3/23/20

at 08:31;  Status DC


Potassium Chloride 15 meq/ Bicarbonate Dialysis Soln w/ out KCl 5,007.5 ml  @ 

1,000 mls/ hr Q5H1M IV  Last administered on 3/24/20at 11:11;  Start 3/23/20 at 

12:00;  Stop 3/24/20 at 11:15;  Status DC


Potassium Chloride 15 meq/ Bicarbonate Dialysis Soln w/ out KCl 5,007.5 ml  @ 

1,000 mls/ hr Q5H1M IV  Last administered on 3/24/20at 11:12;  Start 3/23/20 at 

12:00;  Stop 3/24/20 at 11:17;  Status DC


Potassium Chloride 15 meq/ Bicarbonate Dialysis Soln w/ out KCl 5,007.5 ml  @ 

1,000 mls/ hr Q5H1M IV  Last administered on 3/24/20at 11:11;  Start 3/23/20 at 

12:00;  Stop 3/24/20 at 11:19;  Status DC


Sodium Chloride 90 meq/Potassium Chloride 15 meq/ Potassium Phosphate 10 mmol/ 

Magnesium Sulfate 10 meq/Calcium Gluconate 20 meq/ Multivitamins 10 ml/Chromium/

Copper/Manganese/ Seleni/Zn 0.5 ml/ Total Parenteral Nutrition/Amino 

Acids/Dextrose/ Fat Emulsion Intravenous 1,400 ml @  58.333 mls/ hr TPN  CONT IV

 Last administered on 3/23/20at 21:42;  Start 3/23/20 at 22:00;  Stop 3/24/20 at

21:59;  Status DC


Heparin Sodium (Porcine) (Heparin Sodium) 5,000 unit Q8HRS SQ  Last administered

on 3/28/20at 05:55;  Start 3/23/20 at 15:00;  Stop 3/28/20 at 13:28;  Status DC


Meropenem 500 mg/ Sodium Chloride 50 ml @  100 mls/hr Q6HRS IV  Last admin

istered on 3/25/20at 06:00;  Start 3/24/20 at 09:00;  Stop 3/25/20 at 07:29;  

Status DC


Potassium Phosphate 20 mmol/ Sodium Chloride 106.6667 ml @  51.667 m... 1X  ONCE

IV  Last administered on 3/24/20at 11:22;  Start 3/24/20 at 10:15;  Stop 3/24/20

at 12:18;  Status DC


Acetaminophen (Tylenol Supp) 650 mg PRN Q6HRS  PRN NH MILD PAIN / TEMP > 100.3'F

Last administered on 5/3/20at 23:26;  Start 3/24/20 at 10:30


Potassium Chloride/Water 100 ml @  100 mls/hr Q1H IV  Last administered on 

3/24/20at 12:12;  Start 3/24/20 at 11:00;  Stop 3/24/20 at 12:59;  Status DC


Potassium Chloride 20 meq/ Bicarbonate Dialysis Soln w/ out KCl 5,010 ml @  

1,000 mls/hr Q5H1M IV  Last administered on 3/25/20at 08:48;  Start 3/24/20 at 

12:00;  Stop 3/25/20 at 13:03;  Status DC


Potassium Chloride 20 meq/ Bicarbonate Dialysis Soln w/ out KCl 5,010 ml @  

1,000 mls/hr Q5H1M IV  Last administered on 3/29/20at 14:52;  Start 3/24/20 at 

11:30;  Stop 3/29/20 at 19:59;  Status DC


Potassium Chloride 20 meq/ Bicarbonate Dialysis Soln w/ out KCl 5,010 ml @  

1,000 mls/hr Q5H1M IV  Last administered on 3/29/20at 14:53;  Start 3/24/20 at 

11:30;  Stop 3/29/20 at 19:59;  Status DC


Sodium Chloride 90 meq/Potassium Chloride 15 meq/ Potassium Phosphate 15 mmol/ 

Magnesium Sulfate 10 meq/Calcium Gluconate 15 meq/ Multivitamins 10 ml/Chromium/

Copper/Manganese/ Seleni/Zn 0.5 ml/ Total Parenteral Nutrition/Amino 

Acids/Dextrose/ Fat Emulsion Intravenous 1,400 ml @  58.333 mls/ hr TPN  CONT IV

 Last administered on 3/24/20at 22:17;  Start 3/24/20 at 22:00;  Stop 3/25/20 at

21:59;  Status DC


Cefepime HCl (Maxipime) 2 gm Q12HR IVP  Last administered on 4/7/20at 20:56;  

Start 3/25/20 at 09:00;  Stop 4/8/20 at 09:58;  Status DC


Daptomycin 500 mg/ Sodium Chloride 50 ml @  100 mls/hr Q48H IV  Last 

administered on 4/10/20at 09:57;  Start 3/25/20 at 08:30;  Stop 4/10/20 at 

10:07;  Status DC


Lidocaine HCl (Buffered Lidocaine 1%) 3 ml 1X  ONCE INJ  Last administered on 

3/25/20at 10:27;  Start 3/25/20 at 10:30;  Stop 3/25/20 at 10:31;  Status DC


Potassium Phosphate 20 mmol/ Sodium Chloride 106.6667 ml @  51.667 m... 1X  ONCE

IV  Last administered on 3/25/20at 12:51;  Start 3/25/20 at 13:00;  Stop 3/25/20

at 15:03;  Status DC


Sodium Chloride 90 meq/Potassium Chloride 15 meq/ Potassium Phosphate 18 mmol/ 

Magnesium Sulfate 8 meq/Calcium Gluconate 15 meq/ Multivitamins 10 ml/Chromium/ 

Copper/Manganese/ Seleni/Zn 0.5 ml/ Total Parenteral Nutrition/Amino Aci

ds/Dextrose/ Fat Emulsion Intravenous 1,400 ml @  58.333 mls/ hr TPN  CONT IV  

Last administered on 3/25/20at 22:16;  Start 3/25/20 at 22:00;  Stop 3/26/20 at 

21:59;  Status DC


Potassium Chloride 20 meq/ Bicarbonate Dialysis Soln w/ out KCl 5,010 ml @  1,0

00 mls/hr Q5H1M IV  Last administered on 3/29/20at 14:54;  Start 3/25/20 at 

16:00;  Stop 3/29/20 at 19:59;  Status DC


Multi-Ingred Cream/Lotion/Oil/ Oint (Artificial Tears Eye Ointment) 1 thomas PRN 

Q1HR  PRN OU DRY EYE, 2nd choice Last administered on 4/13/20at 08:19;  Start 

3/25/20 at 17:30


Sodium Chloride 90 meq/Potassium Chloride 15 meq/ Potassium Phosphate 18 mmol/ 

Magnesium Sulfate 8 meq/Calcium Gluconate 15 meq/ Multivitamins 10 ml/Chromium/ 

Copper/Manganese/ Seleni/Zn 0.5 ml/ Total Parenteral Nutrition/Amino 

Acids/Dextrose/ Fat Emulsion Intravenous 1,400 ml @  58.333 mls/ hr TPN  CONT IV

 Last administered on 3/26/20at 22:00;  Start 3/26/20 at 22:00;  Stop 3/27/20 at

21:59;  Status DC


Albumin Human 500 ml @  125 mls/hr 1X  ONCE IV ;  Start 3/26/20 at 14:15;  Stop 

3/26/20 at 18:14;  Status DC


Sodium Chloride 90 meq/Potassium Chloride 15 meq/ Potassium Phosphate 18 mmol/ 

Magnesium Sulfate 8 meq/Calcium Gluconate 15 meq/ Multivitamins 10 ml/Chromium/ 

Copper/Manganese/ Seleni/Zn 0.5 ml/ Insulin Human Regular 10 unit/ Total 

Parenteral Nutrition/Amino Acids/Dextrose/ Fat Emulsion Intravenous 1,400 ml @  

58.333 mls/ hr TPN  CONT IV  Last administered on 3/27/20at 21:43;  Start 

3/27/20 at 22:00;  Stop 3/28/20 at 21:59;  Status DC


Lidocaine HCl (Buffered Lidocaine 1%) 3 ml STK-MED ONCE .ROUTE ;  Start 3/25/20 

at 10:00;  Stop 3/27/20 at 13:57;  Status DC


Midazolam HCl 100 mg/Sodium Chloride 100 ml @ 7 mls/hr CONT  PRN IV SEE PROTOCOL

Last administered on 4/8/20at 15:35;  Start 3/28/20 at 16:00


Sodium Chloride 90 meq/Potassium Chloride 15 meq/ Potassium Phosphate 18 mmol/ 

Magnesium Sulfate 8 meq/Calcium Gluconate 15 meq/ Multivitamins 10 ml/Chromium/ 

Copper/Manganese/ Seleni/Zn 0.5 ml/ Insulin Human Regular 15 unit/ Total 

Parenteral Nutrition/Amino Acids/Dextrose/ Fat Emulsion Intravenous 1,400 ml @  

58.333 mls/ hr TPN  CONT IV  Last administered on 3/28/20at 20:34;  Start 

3/28/20 at 22:00;  Stop 3/29/20 at 21:59;  Status DC


Info ([facility] Electrolyte Protocol) 1 ea CONT PRN  PRN MC PER PROTOCOL;  Start 

3/29/20 at 13:15


Sodium Chloride 90 meq/Potassium Chloride 15 meq/ Potassium Phosphate 18 mmol/ 

Magnesium Sulfate 8 meq/Calcium Gluconate 15 meq/ Multivitamins 10 ml/Chromium/ 

Copper/Manganese/ Seleni/Zn 0.5 ml/ Insulin Human Regular 15 unit/ Total 

Parenteral Nutrition/Amino Acids/Dextrose/ Fat Emulsion Intravenous 1,400 ml @  

58.333 mls/ hr TPN  CONT IV  Last administered on 3/29/20at 22:05;  Start 3

/29/20 at 22:00;  Stop 3/30/20 at 21:59;  Status DC


Potassium Chloride 15 meq/ Bicarbonate Dialysis Soln w/ out KCl 5,007.5 ml  @ 

1,000 mls/ hr Q5H1M IV  Last administered on 4/1/20at 18:14;  Start 3/29/20 at 

20:00;  Stop 4/2/20 at 13:08;  Status DC


Potassium Chloride 15 meq/ Bicarbonate Dialysis Soln w/ out KCl 5,007.5 ml  @ 

1,000 mls/ hr Q5H1M IV  Last administered on 4/1/20at 18:14;  Start 3/29/20 at 

20:00;  Stop 4/2/20 at 13:08;  Status DC


Potassium Chloride 15 meq/ Bicarbonate Dialysis Soln w/ out KCl 5,007.5 ml  @ 

1,000 mls/ hr Q5H1M IV  Last administered on 4/1/20at 18:14;  Start 3/29/20 at 

20:00;  Stop 4/2/20 at 13:08;  Status DC


Iohexol (Omnipaque 240 Mg/ml) 30 ml 1X  ONCE PO  Last administered on 3/30/20at 

11:30;  Start 3/30/20 at 11:30;  Stop 3/30/20 at 11:33;  Status DC


Info (CONTRAST GIVEN -- Rx MONITORING) 1 each PRN DAILY  PRN MC SEE COMMENTS;  

Start 3/30/20 at 11:45;  Stop 4/1/20 at 11:44;  Status DC


Sodium Chloride 90 meq/Potassium Chloride 15 meq/ Potassium Phosphate 18 mmol/ 

Magnesium Sulfate 8 meq/Calcium Gluconate 15 meq/ Multivitamins 10 ml/Chromium/ 

Copper/Manganese/ Seleni/Zn 0.5 ml/ Insulin Human Regular 15 unit/ Total 

Parenteral Nutrition/Amino Acids/Dextrose/ Fat Emulsion Intravenous 1,400 ml @  

58.333 mls/ hr TPN  CONT IV  Last administered on 3/30/20at 21:47;  Start 

3/30/20 at 22:00;  Stop 3/31/20 at 21:59;  Status DC


Sodium Chloride 90 meq/Potassium Chloride 15 meq/ Potassium Phosphate 18 mmol/ 

Magnesium Sulfate 8 meq/Calcium Gluconate 15 meq/ Multivitamins 10 ml/Chromium/ 

Copper/Manganese/ Seleni/Zn 0.5 ml/ Insulin Human Regular 20 unit/ Total 

Parenteral Nutrition/Amino Acids/Dextrose/ Fat Emulsion Intravenous 1,400 ml @  

58.333 mls/ hr TPN  CONT IV  Last administered on 3/31/20at 21:36;  Start 

3/31/20 at 22:00;  Stop 4/1/20 at 21:59;  Status DC


Alteplase, Recombinant (Cathflo For Central Catheter Clearance) 1 mg 1X  ONCE 

INT CAT  Last administered on 3/31/20at 20:03;  Start 3/31/20 at 19:30;  Stop 

3/31/20 at 19:46;  Status DC


Alteplase, Recombinant (Cathflo For Central Catheter Clearance) 1 mg 1X  ONCE 

INT CAT  Last administered on 3/31/20at 22:05;  Start 3/31/20 at 22:00;  Stop 

3/31/20 at 22:01;  Status DC


Sodium Chloride 90 meq/Potassium Chloride 15 meq/ Potassium Phosphate 18 mmol/ 

Magnesium Sulfate 8 meq/Calcium Gluconate 15 meq/ Multivitamins 10 ml/Chromium/ 

Copper/Manganese/ Seleni/Zn 0.5 ml/ Insulin Human Regular 20 unit/ Total 

Parenteral Nutrition/Amino Acids/Dextrose/ Fat Emulsion Intravenous 1,400 ml @  

58.333 mls/ hr TPN  CONT IV  Last administered on 4/1/20at 21:30;  Start 4/1/20 

at 22:00;  Stop 4/2/20 at 21:59;  Status DC


Dexmedetomidine HCl 400 mcg/ Sodium Chloride 100 ml @ 0 mls/hr CONT  PRN IV 

ANXIETY / AGITATION Last administered on 5/4/20at 18:21;  Start 4/2/20 at 08:15


Sodium Chloride 500 ml @  500 mls/hr 1X PRN  PRN IV ELEVATED BP, SEE COMMENTS;  

Start 4/2/20 at 08:15


Atropine Sulfate (ATROPINE 0.5mg SYRINGE) 0.5 mg PRN Q5MIN  PRN IV SEE COMMENTS;

 Start 4/2/20 at 08:15


Furosemide (Lasix) 20 mg 1X  ONCE IVP  Last administered on 4/2/20at 08:19;  

Start 4/2/20 at 08:15;  Stop 4/2/20 at 08:16;  Status DC


Lidocaine HCl (Buffered Lidocaine 1%) 3 ml STK-MED ONCE .ROUTE ;  Start 4/2/20 

at 08:39;  Stop 4/2/20 at 08:39;  Status DC


Lidocaine HCl (Buffered Lidocaine 1%) 6 ml 1X  ONCE INJ  Last administered on 

4/2/20at 09:05;  Start 4/2/20 at 09:00;  Stop 4/2/20 at 09:06;  Status DC


Sodium Chloride 90 meq/Potassium Chloride 15 meq/ Potassium Phosphate 18 mmol/ 

Magnesium Sulfate 8 meq/Calcium Gluconate 15 meq/ Multivitamins 10 ml/Chromium/ 

Copper/Manganese/ Seleni/Zn 0.5 ml/ Insulin Human Regular 20 unit/ Total 

Parenteral Nutrition/Amino Acids/Dextrose/ Fat Emulsion Intravenous 1,400 ml @  

58.333 mls/ hr TPN  CONT IV  Last administered on 4/2/20at 22:45;  Start 4/2/20 

at 22:00;  Stop 4/3/20 at 21:59;  Status DC


Sodium Chloride 1,000 ml @  1,000 mls/hr Q1H PRN IV hypotension;  Start 4/3/20 

at 07:30;  Stop 4/3/20 at 13:29;  Status DC


Albumin Human 200 ml @  200 mls/hr 1X PRN  PRN IV Hypotension Last administered 

on 4/3/20at 09:36;  Start 4/3/20 at 07:30;  Stop 4/3/20 at 13:29;  Status DC


Sodium Chloride (Normal Saline Flush) 10 ml 1X PRN  PRN IV AP catheter pack;  

Start 4/3/20 at 07:30;  Stop 4/3/20 at 21:29;  Status DC


Sodium Chloride (Normal Saline Flush) 10 ml 1X PRN  PRN IV  catheter pack;  

Start 4/3/20 at 07:30;  Stop 4/4/20 at 07:29;  Status DC


Sodium Chloride 1,000 ml @  400 mls/hr Q2H30M PRN IV PATENCY;  Start 4/3/20 at 

07:30;  Stop 4/3/20 at 19:29;  Status DC


Info (PHARMACY MONITORING -- do not chart) 1 each PRN DAILY  PRN MC SEE 

COMMENTS;  Start 4/3/20 at 07:30;  Stop 4/3/20 at 13:02;  Status DC


Info (PHARMACY MONITORING -- do not chart) 1 each PRN DAILY  PRN MC SEE 

COMMENTS;  Start 4/3/20 at 07:30;  Stop 4/5/20 at 12:45;  Status DC


Sodium Chloride 90 meq/Potassium Chloride 15 meq/ Potassium Phosphate 10 mmol/ 

Magnesium Sulfate 8 meq/Calcium Gluconate 15 meq/ Multivitamins 10 ml/Chromium/ 

Copper/Manganese/ Seleni/Zn 0.5 ml/ Insulin Human Regular 25 unit/ Total 

Parenteral Nutrition/Amino Acids/Dextrose/ Fat Emulsion Intravenous 1,400 ml @  

58.333 mls/ hr TPN  CONT IV  Last administered on 4/3/20at 22:19;  Start 4/3/20 

at 22:00;  Stop 4/4/20 at 21:59;  Status DC


Heparin Sodium (Porcine) (Heparin Sodium) 5,000 unit Q12HR SQ  Last administered

on 4/26/20at 08:59;  Start 4/3/20 at 21:00;  Stop 4/26/20 at 10:05;  Status DC


Ondansetron HCl (Zofran) 4 mg PRN Q6HRS  PRN IV NAUSEA/VOMITING;  Start 4/6/20 

at 07:00;  Stop 4/7/20 at 06:59;  Status DC


Fentanyl Citrate (Fentanyl 2ml Vial) 25 mcg PRN Q5MIN  PRN IV MILD PAIN 1-3;  

Start 4/6/20 at 07:00;  Stop 4/7/20 at 06:59;  Status DC


Fentanyl Citrate (Fentanyl 2ml Vial) 50 mcg PRN Q5MIN  PRN IV MODERATE TO SEVERE

PAIN;  Start 4/6/20 at 07:00;  Stop 4/7/20 at 06:59;  Status DC


Ringer's Solution 1,000 ml @  30 mls/hr Q24H IV ;  Start 4/6/20 at 07:00;  Stop 

4/6/20 at 18:59;  Status DC


Lidocaine HCl (Xylocaine-Mpf 1% 2ml Vial) 2 ml PRN 1X  PRN ID PRIOR TO IV START;

 Start 4/6/20 at 07:00;  Stop 4/7/20 at 06:59;  Status DC


Prochlorperazine Edisylate (Compazine) 5 mg PACU PRN  PRN IV NAUSEA, MRX1;  

Start 4/6/20 at 07:00;  Stop 4/7/20 at 06:59;  Status DC


Sodium Chloride 1,000 ml @  1,000 mls/hr Q1H PRN IV hypotension;  Start 4/4/20 

at 09:10;  Stop 4/4/20 at 15:09;  Status DC


Albumin Human 200 ml @  200 mls/hr 1X PRN  PRN IV Hypotension Last administered 

on 4/4/20at 10:10;  Start 4/4/20 at 09:15;  Stop 4/4/20 at 15:14;  Status DC


Sodium Chloride 1,000 ml @  400 mls/hr Q2H30M PRN IV PATENCY;  Start 4/4/20 at 

09:10;  Stop 4/4/20 at 21:09;  Status DC


Info (PHARMACY MONITORING -- do not chart) 1 each PRN DAILY  PRN MC SEE 

COMMENTS;  Start 4/4/20 at 09:15;  Stop 4/5/20 at 12:45;  Status DC


Info (PHARMACY MONITORING -- do not chart) 1 each PRN DAILY  PRN MC SEE 

COMMENTS;  Start 4/4/20 at 09:15;  Stop 4/5/20 at 12:45;  Status DC


Sodium Chloride 90 meq/Potassium Chloride 15 meq/ Potassium Phosphate 10 mmol/ 

Magnesium Sulfate 8 meq/Calcium Gluconate 15 meq/ Multivitamins 10 ml/Chromium/ 

Copper/Manganese/ Seleni/Zn 0.5 ml/ Insulin Human Regular 25 unit/ Total 

Parenteral Nutrition/Amino Acids/Dextrose/ Fat Emulsion Intravenous 1,400 ml @  

58.333 mls/ hr TPN  CONT IV  Last administered on 4/4/20at 22:10;  Start 4/4/20 

at 22:00;  Stop 4/5/20 at 21:59;  Status DC


Magnesium Sulfate 50 ml @ 25 mls/hr PRN DAILY  PRN IV for Mag < 1.7 on am labs 

Last administered on 4/20/20at 17:27;  Start 4/5/20 at 09:15


Sodium Chloride 90 meq/Potassium Chloride 15 meq/ Potassium Phosphate 10 mmol/ 

Magnesium Sulfate 8 meq/Calcium Gluconate 15 meq/ Multivitamins 10 ml/Chromium/ 

Copper/Manganese/ Seleni/Zn 0.5 ml/ Insulin Human Regular 25 unit/ Total 

Parenteral Nutrition/Amino Acids/Dextrose/ Fat Emulsion Intravenous 1,400 ml @  

58.333 mls/ hr TPN  CONT IV  Last administered on 4/5/20at 21:20;  Start 4/5/20 

at 22:00;  Stop 4/6/20 at 21:59;  Status DC


Sodium Chloride 1,000 ml @  1,000 mls/hr Q1H PRN IV hypotension;  Start 4/5/20 

at 12:23;  Stop 4/5/20 at 18:22;  Status DC


Albumin Human 200 ml @  200 mls/hr 1X  ONCE IV  Last administered on 4/5/20at 

13:34;  Start 4/5/20 at 12:30;  Stop 4/5/20 at 13:29;  Status DC


Diphenhydramine HCl (Benadryl) 25 mg 1X PRN  PRN IV ITCHING;  Start 4/5/20 at 

12:30;  Stop 4/6/20 at 12:29;  Status DC


Diphenhydramine HCl (Benadryl) 25 mg 1X PRN  PRN IV ITCHING;  Start 4/5/20 at 

12:30;  Stop 4/6/20 at 12:29;  Status DC


Info (PHARMACY MONITORING -- do not chart) 1 each PRN DAILY  PRN MC SEE 

COMMENTS;  Start 4/5/20 at 12:30;  Status Cancel


Bupivacaine HCl/ Epinephrine Bitart (Sensorcain-Epi 0.5%-1:864399 Mpf) 30 ml 

STK-MED ONCE .ROUTE  Last administered on 4/6/20at 11:44;  Start 4/6/20 at 

11:00;  Stop 4/6/20 at 11:01;  Status DC


Cellulose (Surgicel Fibrillar 1x2) 1 each STK-MED ONCE .ROUTE ;  Start 4/6/20 at

11:00;  Stop 4/6/20 at 11:01;  Status DC


Sodium Chloride 90 meq/Potassium Chloride 15 meq/ Potassium Phosphate 10 mmol/ 

Magnesium Sulfate 12 meq/Calcium Gluconate 15 meq/ Multivitamins 10 ml/Chromium/

Copper/Manganese/ Seleni/Zn 0.5 ml/ Insulin Human Regular 25 unit/ Total 

Parenteral Nutrition/Amino Acids/Dextrose/ Fat Emulsion Intravenous 1,400 ml @  

58.333 mls/ hr TPN  CONT IV  Last administered on 4/6/20at 22:24;  Start 4/6/20 

at 22:00;  Stop 4/7/20 at 21:59;  Status DC


Propofol 20 ml @ As Directed STK-MED ONCE IV ;  Start 4/6/20 at 11:07;  Stop 

4/6/20 at 11:07;  Status DC


Cellulose (Surgicel Hemostat 4x8) 1 each STK-MED ONCE .ROUTE  Last administered 

on 4/6/20at 11:44;  Start 4/6/20 at 11:55;  Stop 4/6/20 at 11:56;  Status DC


Sevoflurane (Ultane) 60 ml STK-MED ONCE IH ;  Start 4/6/20 at 12:46;  Stop 

4/6/20 at 12:46;  Status DC


Sodium Chloride 1,000 ml @  1,000 mls/hr Q1H PRN IV hypotension;  Start 4/6/20 

at 13:51;  Stop 4/6/20 at 19:50;  Status DC


Albumin Human 200 ml @  200 mls/hr 1X PRN  PRN IV Hypotension Last administered 

on 4/6/20at 14:51;  Start 4/6/20 at 14:00;  Stop 4/6/20 at 19:59;  Status DC


Diphenhydramine HCl (Benadryl) 25 mg 1X PRN  PRN IV ITCHING;  Start 4/6/20 at 

14:00;  Stop 4/7/20 at 13:59;  Status DC


Diphenhydramine HCl (Benadryl) 25 mg 1X PRN  PRN IV ITCHING;  Start 4/6/20 at 

14:00;  Stop 4/7/20 at 13:59;  Status DC


Sodium Chloride 1,000 ml @  400 mls/hr Q2H30M PRN IV PATENCY;  Start 4/6/20 at 

13:51;  Stop 4/7/20 at 01:50;  Status DC


Info (PHARMACY MONITORING -- do not chart) 1 each PRN DAILY  PRN MC SEE 

COMMENTS;  Start 4/6/20 at 14:00;  Stop 4/9/20 at 08:16;  Status DC


Heparin Sodium (Porcine) (Hep Lock Adult) 500 unit STK-MED ONCE IVP ;  Start 

4/7/20 at 09:29;  Stop 4/7/20 at 09:30;  Status DC


Sodium Chloride 1,000 ml @  1,000 mls/hr Q1H PRN IV hypotension;  Start 4/7/20 

at 10:43;  Stop 4/7/20 at 16:42;  Status DC


Sodium Chloride 1,000 ml @  400 mls/hr Q2H30M PRN IV PATENCY;  Start 4/7/20 at 

10:43;  Stop 4/7/20 at 22:42;  Status DC


Info (PHARMACY MONITORING -- do not chart) 1 each PRN DAILY  PRN MC SEE 

COMMENTS;  Start 4/7/20 at 10:45;  Status UNV


Info (PHARMACY MONITORING -- do not chart) 1 each PRN DAILY  PRN MC SEE 

COMMENTS;  Start 4/7/20 at 10:45;  Status UNV


Sodium Chloride 90 meq/Potassium Chloride 15 meq/ Magnesium Sulfate 12 

meq/Calcium Gluconate 15 meq/ Multivitamins 10 ml/Chromium/ Copper/Manganese/ 

Seleni/Zn 0.5 ml/ Insulin Human Regular 25 unit/ Total Parenteral 

Nutrition/Amino Acids/Dextrose/ Fat Emulsion Intravenous 1,400 ml @  58.333 mls/

hr TPN  CONT IV  Last administered on 4/7/20at 22:13;  Start 4/7/20 at 22:00;  

Stop 4/8/20 at 21:59;  Status DC


Sodium Chloride 1,000 ml @  1,000 mls/hr Q1H PRN IV hypotension;  Start 4/8/20 

at 07:50;  Stop 4/8/20 at 13:49;  Status DC


Albumin Human 200 ml @  200 mls/hr 1X  ONCE IV ;  Start 4/8/20 at 08:00;  Stop 

4/8/20 at 08:53;  Status DC


Diphenhydramine HCl (Benadryl) 25 mg 1X PRN  PRN IV ITCHING;  Start 4/8/20 at 

08:00;  Stop 4/9/20 at 07:59;  Status DC


Diphenhydramine HCl (Benadryl) 25 mg 1X PRN  PRN IV ITCHING;  Start 4/8/20 at 

08:00;  Stop 4/9/20 at 07:59;  Status DC


Info (PHARMACY MONITORING -- do not chart) 1 each PRN DAILY  PRN MC SEE 

COMMENTS;  Start 4/8/20 at 08:00;  Stop 4/9/20 at 08:16;  Status DC


Albumin Human 50 ml @ 50 mls/hr 1X  ONCE IV ;  Start 4/8/20 at 08:53;  Stop 

4/8/20 at 08:56;  Status DC


Albumin Human 200 ml @  50 mls/hr PRN 1X  PRN IV HYPOTENSION Last administered 

on 4/14/20at 11:54;  Start 4/8/20 at 09:00


Meropenem 500 mg/ Sodium Chloride 50 ml @  100 mls/hr Q12H IV  Last administered

on 4/28/20at 10:45;  Start 4/8/20 at 10:00;  Stop 4/28/20 at 12:37;  Status DC


Sodium Chloride 90 meq/Magnesium Sulfate 12 meq/ Calcium Gluconate 15 meq/ 

Multivitamins 10 ml/Chromium/ Copper/Manganese/ Seleni/Zn 0.5 ml/ Insulin Human 

Regular 25 unit/ Total Parenteral Nutrition/Amino Acids/Dextrose/ Fat Emulsion 

Intravenous 1,400 ml @  58.333 mls/ hr TPN  CONT IV  Last administered on 

4/8/20at 21:41;  Start 4/8/20 at 22:00;  Stop 4/9/20 at 21:59;  Status DC


Sodium Chloride 1,000 ml @  1,000 mls/hr Q1H PRN IV hypotension;  Start 4/9/20 

at 07:58;  Stop 4/9/20 at 13:57;  Status DC


Albumin Human 200 ml @  200 mls/hr 1X PRN  PRN IV Hypotension Last administered 

on 4/9/20at 09:30;  Start 4/9/20 at 08:00;  Stop 4/9/20 at 13:59;  Status DC


Sodium Chloride 1,000 ml @  400 mls/hr Q2H30M PRN IV PATENCY;  Start 4/9/20 at 

07:58;  Stop 4/9/20 at 19:57;  Status DC


Info (PHARMACY MONITORING -- do not chart) 1 each PRN DAILY  PRN MC SEE 

COMMENTS;  Start 4/9/20 at 08:00;  Status Cancel


Info (PHARMACY MONITORING -- do not chart) 1 each PRN DAILY  PRN MC SEE 

COMMENTS;  Start 4/9/20 at 08:15;  Status UNV


Sodium Chloride 90 meq/Potassium Phosphate 5 mmol/ Magnesium Sulfate 12 

meq/Calcium Gluconate 15 meq/ Multivitamins 10 ml/Chromium/ Copper/Manganese/ 

Seleni/Zn 0.5 ml/ Insulin Human Regular 30 unit/ Total Parenteral 

Nutrition/Amino Acids/Dextrose/ Fat Emulsion Intravenous 1,400 ml @  58.333 mls/

hr TPN  CONT IV  Last administered on 4/9/20at 22:08;  Start 4/9/20 at 22:00;  

Stop 4/10/20 at 21:59;  Status DC


Linezolid/Dextrose 300 ml @  300 mls/hr Q12HR IV  Last administered on 4/20/20at

20:40;  Start 4/10/20 at 11:00;  Stop 4/21/20 at 08:10;  Status DC


Sodium Chloride 90 meq/Potassium Phosphate 15 mmol/ Magnesium Sulfate 12 

meq/Calcium Gluconate 15 meq/ Multivitamins 10 ml/Chromium/ Copper/Manganese/ 

Seleni/Zn 0.5 ml/ Insulin Human Regular 30 unit/ Total Parenteral 

Nutrition/Amino Acids/Dextrose/ Fat Emulsion Intravenous 1,400 ml @  58.333 mls/

hr TPN  CONT IV  Last administered on 4/10/20at 21:49;  Start 4/10/20 at 22:00; 

Stop 4/11/20 at 21:59;  Status DC


Sodium Chloride 90 meq/Potassium Phosphate 15 mmol/ Magnesium Sulfate 12 

meq/Calcium Gluconate 15 meq/ Multivitamins 10 ml/Chromium/ Copper/Manganese/ 

Seleni/Zn 0.5 ml/ Insulin Human Regular 40 unit/ Total Parenteral Nutrition/Ami

no Acids/Dextrose/ Fat Emulsion Intravenous 1,400 ml @  58.333 mls/ hr TPN  CONT

IV  Last administered on 4/11/20at 21:21;  Start 4/11/20 at 22:00;  Stop 4/12/20

at 21:59;  Status DC


Sodium Chloride 1,000 ml @  1,000 mls/hr Q1H PRN IV hypotension;  Start 4/11/20 

at 13:26;  Stop 4/11/20 at 19:25;  Status DC


Albumin Human 200 ml @  200 mls/hr 1X PRN  PRN IV Hypotension Last administered 

on 4/11/20at 15:00;  Start 4/11/20 at 13:30;  Stop 4/11/20 at 19:29;  Status DC


Sodium Chloride (Normal Saline Flush) 10 ml 1X PRN  PRN IV AP catheter pack;  

Start 4/11/20 at 13:30;  Stop 4/12/20 at 13:29;  Status DC


Sodium Chloride (Normal Saline Flush) 10 ml 1X PRN  PRN IV  catheter pack;  

Start 4/11/20 at 13:30;  Stop 4/12/20 at 13:29;  Status DC


Sodium Chloride 1,000 ml @  400 mls/hr Q2H30M PRN IV PATENCY;  Start 4/11/20 at 

13:26;  Stop 4/12/20 at 01:25;  Status DC


Info (PHARMACY MONITORING -- do not chart) 1 each PRN DAILY  PRN MC SEE 

COMMENTS;  Start 4/11/20 at 13:30;  Stop 4/11/20 at 13:33;  Status DC


Info (PHARMACY MONITORING -- do not chart) 1 each PRN DAILY  PRN MC SEE 

COMMENTS;  Start 4/11/20 at 13:30;  Stop 4/11/20 at 13:34;  Status DC


Sodium Chloride 90 meq/Potassium Phosphate 19 mmol/ Magnesium Sulfate 12 

meq/Calcium Gluconate 15 meq/ Multivitamins 10 ml/Chromium/ Copper/Manganese/ 

Seleni/Zn 0.5 ml/ Insulin Human Regular 40 unit/ Total Parenteral 

Nutrition/Amino Acids/Dextrose/ Fat Emulsion Intravenous 1,400 ml @  58.333 mls/

hr TPN  CONT IV  Last administered on 4/12/20at 21:54;  Start 4/12/20 at 22:00; 

Stop 4/13/20 at 21:59;  Status DC


Sodium Chloride 1,000 ml @  1,000 mls/hr Q1H PRN IV hypotension;  Start 4/13/20 

at 09:35;  Stop 4/13/20 at 15:34;  Status DC


Albumin Human 200 ml @  200 mls/hr 1X PRN  PRN IV Hypotension;  Start 4/13/20 at

09:45;  Stop 4/13/20 at 15:44;  Status DC


Diphenhydramine HCl (Benadryl) 25 mg 1X PRN  PRN IV ITCHING;  Start 4/13/20 at 

09:45;  Stop 4/14/20 at 09:44;  Status DC


Diphenhydramine HCl (Benadryl) 25 mg 1X PRN  PRN IV ITCHING;  Start 4/13/20 at 

09:45;  Stop 4/14/20 at 09:44;  Status DC


Sodium Chloride 1,000 ml @  400 mls/hr Q2H30M PRN IV PATENCY;  Start 4/13/20 at 

09:35;  Stop 4/13/20 at 21:34;  Status DC


Info (PHARMACY MONITORING -- do not chart) 1 each PRN DAILY  PRN MC SEE 

COMMENTS;  Start 4/13/20 at 09:45;  Status Cancel


Sodium Chloride 100 meq/Potassium Phosphate 19 mmol/ Magnesium Sulfate 12 

meq/Calcium Gluconate 15 meq/ Multivitamins 10 ml/Chromium/ Copper/Manganese/ 

Seleni/Zn 0.5 ml/ Insulin Human Regular 40 unit/ Potassium Chloride 20 meq/ 

Total Parenteral Nutrition/Amino Acids/Dextrose/ Fat Emulsion Intravenous 1,400 

ml @  58.333 mls/ hr TPN  CONT IV  Last administered on 4/13/20at 22:02;  Start 

4/13/20 at 22:00;  Stop 4/14/20 at 21:59;  Status DC


Furosemide (Lasix) 40 mg 1X  ONCE IVP  Last administered on 4/13/20at 14:39;  St

art 4/13/20 at 14:30;  Stop 4/13/20 at 14:31;  Status DC


Metronidazole 100 ml @  100 mls/hr Q8HRS IV  Last administered on 4/21/20at 

06:04;  Start 4/14/20 at 10:00;  Stop 4/21/20 at 08:10;  Status DC


Sodium Chloride 1,000 ml @  1,000 mls/hr Q1H PRN IV hypotension;  Start 4/14/20 

at 08:00;  Stop 4/14/20 at 13:59;  Status DC


Albumin Human 200 ml @  200 mls/hr 1X PRN  PRN IV Hypotension;  Start 4/14/20 at

08:00;  Stop 4/14/20 at 13:59;  Status DC


Sodium Chloride 1,000 ml @  400 mls/hr Q2H30M PRN IV PATENCY;  Start 4/14/20 at 

08:00;  Stop 4/14/20 at 19:59;  Status DC


Info (PHARMACY MONITORING -- do not chart) 1 each PRN DAILY  PRN MC SEE 

COMMENTS;  Start 4/14/20 at 11:30;  Status UNV


Info (PHARMACY MONITORING -- do not chart) 1 each PRN DAILY  PRN MC SEE COMMENT

S;  Start 4/14/20 at 11:30;  Stop 4/16/20 at 12:13;  Status DC


Sodium Chloride 100 meq/Potassium Phosphate 19 mmol/ Magnesium Sulfate 12 

meq/Calcium Gluconate 15 meq/ Multivitamins 10 ml/Chromium/ Copper/Manganese/ 

Seleni/Zn 0.5 ml/ Insulin Human Regular 40 unit/ Potassium Chloride 20 meq/ 

Total Parenteral Nutrition/Amino Acids/Dextrose/ Fat Emulsion Intravenous 1,400 

ml @  58.333 mls/ hr TPN  CONT IV  Last administered on 4/14/20at 21:52;  Start 

4/14/20 at 22:00;  Stop 4/15/20 at 21:59;  Status DC


Sodium Chloride (Normal Saline Flush) 10 ml QSHIFT  PRN IV AFTER MEDS AND BLOOD 

DRAWS;  Start 4/14/20 at 15:00


Sodium Chloride (Normal Saline Flush) 10 ml PRN Q5MIN  PRN IV AFTER MEDS AND 

BLOOD DRAWS;  Start 4/14/20 at 15:00


Sodium Chloride (Normal Saline Flush) 20 ml PRN Q5MIN  PRN IV AFTER MEDS AND 

BLOOD DRAWS;  Start 4/14/20 at 15:00


Sodium Chloride 100 meq/Potassium Phosphate 19 mmol/ Magnesium Sulfate 12 

meq/Calcium Gluconate 15 meq/ Multivitamins 10 ml/Chromium/ Copper/Manganese/ 

Seleni/Zn 0.5 ml/ Insulin Human Regular 40 unit/ Potassium Chloride 20 meq/ 

Total Parenteral Nutrition/Amino Acids/Dextrose/ Fat Emulsion Intravenous 1,400 

ml @  58.333 mls/ hr TPN  CONT IV  Last administered on 4/15/20at 21:20;  Start 

4/15/20 at 22:00;  Stop 4/16/20 at 21:59;  Status DC


Lidocaine HCl (Buffered Lidocaine 1%) 3 ml STK-MED ONCE .ROUTE ;  Start 4/15/20 

at 13:16;  Stop 4/15/20 at 13:16;  Status DC


Lidocaine HCl (Buffered Lidocaine 1%) 6 ml 1X  ONCE INJ  Last administered on 

4/15/20at 13:45;  Start 4/15/20 at 13:30;  Stop 4/15/20 at 13:31;  Status DC


Albumin Human 100 ml @  100 mls/hr 1X  ONCE IV  Last administered on 4/15/20at 

15:41;  Start 4/15/20 at 15:00;  Stop 4/15/20 at 15:59;  Status DC


Albumin Human 50 ml @ 50 mls/hr 1X  ONCE IV  Last administered on 4/15/20at 

15:00;  Start 4/15/20 at 15:00;  Stop 4/15/20 at 15:59;  Status DC


Info (PHARMACY MONITORING -- do not chart) 1 each PRN DAILY  PRN MC SEE 

COMMENTS;  Start 4/16/20 at 11:30;  Status Cancel


Info (PHARMACY MONITORING -- do not chart) 1 each PRN DAILY  PRN MC SEE C

OMMENTS;  Start 4/16/20 at 11:30;  Status UNV


Sodium Chloride 100 meq/Potassium Phosphate 10 mmol/ Magnesium Sulfate 12 

meq/Calcium Gluconate 15 meq/ Multivitamins 10 ml/Chromium/ Copper/Manganese/ 

Seleni/Zn 0.5 ml/ Insulin Human Regular 35 unit/ Potassium Chloride 20 meq/ 

Total Parenteral Nutrition/Amino Acids/Dextrose/ Fat Emulsion Intravenous 1,400 

ml @  58.333 mls/ hr TPN  CONT IV  Last administered on 4/16/20at 22:10;  Start 

4/16/20 at 22:00;  Stop 4/17/20 at 21:59;  Status DC


Sodium Chloride 100 meq/Potassium Phosphate 5 mmol/ Magnesium Sulfate 12 

meq/Calcium Gluconate 15 meq/ Multivitamins 10 ml/Chromium/ Copper/Manganese/ Se

aram/Zn 0.5 ml/ Insulin Human Regular 35 unit/ Potassium Chloride 20 meq/ Total 

Parenteral Nutrition/Amino Acids/Dextrose/ Fat Emulsion Intravenous 1,400 ml @  

58.333 mls/ hr TPN  CONT IV  Last administered on 4/17/20at 22:59;  Start 

4/17/20 at 22:00;  Stop 4/18/20 at 21:59;  Status DC


Sodium Chloride 1,000 ml @  1,000 mls/hr Q1H PRN IV hypotension;  Start 4/18/20 

at 08:27;  Stop 4/18/20 at 14:26;  Status DC


Albumin Human 200 ml @  200 mls/hr 1X PRN  PRN IV Hypotension Last administered 

on 4/18/20at 09:18;  Start 4/18/20 at 08:30;  Stop 4/18/20 at 14:29;  Status DC


Sodium Chloride 1,000 ml @  400 mls/hr Q2H30M PRN IV PATENCY;  Start 4/18/20 at 

08:27;  Stop 4/18/20 at 20:26;  Status DC


Info (PHARMACY MONITORING -- do not chart) 1 each PRN DAILY  PRN MC SEE CO

MMENTS;  Start 4/18/20 at 08:30;  Status Cancel


Info (PHARMACY MONITORING -- do not chart) 1 each PRN DAILY  PRN MC SEE COMMENTS

;  Start 4/18/20 at 08:30;  Stop 4/26/20 at 13:10;  Status DC


Sodium Chloride 100 meq/Potassium Chloride 40 meq/ Magnesium Sulfate 15 

meq/Calcium Gluconate 15 meq/ Multivitamins 10 ml/Chromium/ Copper/Manganese/ 

Seleni/Zn 0.5 ml/ Insulin Human Regular 35 unit/ Total Parenteral 

Nutrition/Amino Acids/Dextrose/ Fat Emulsion Intravenous 1,400 ml @  58.333 mls/

hr TPN  CONT IV  Last administered on 4/18/20at 22:00;  Start 4/18/20 at 22:00; 

Stop 4/19/20 at 21:59;  Status DC


Potassium Chloride/Water 100 ml @  100 mls/hr 1X  ONCE IV  Last administered on 

4/18/20at 17:28;  Start 4/18/20 at 14:45;  Stop 4/18/20 at 15:44;  Status DC


Sodium Chloride 100 meq/Potassium Chloride 40 meq/ Magnesium Sulfate 15 

meq/Calcium Gluconate 15 meq/ Multivitamins 10 ml/Chromium/ Copper/Manganese/ 

Seleni/Zn 0.5 ml/ Insulin Human Regular 35 unit/ Total Parenteral 

Nutrition/Amino Acids/Dextrose/ Fat Emulsion Intravenous 1,400 ml @  58.333 mls/

hr TPN  CONT IV  Last administered on 4/19/20at 22:46;  Start 4/19/20 at 22:00; 

Stop 4/20/20 at 21:59;  Status DC


Sodium Chloride 100 meq/Potassium Chloride 40 meq/ Magnesium Sulfate 20 

meq/Calcium Gluconate 15 meq/ Multivitamins 10 ml/Chromium/ Copper/Manganese/ 

Seleni/Zn 0.5 ml/ Insulin Human Regular 35 unit/ Total Parenteral 

Nutrition/Amino Acids/Dextrose/ Fat Emulsion Intravenous 1,400 ml @  58.333 mls/

hr TPN  CONT IV  Last administered on 4/20/20at 22:31;  Start 4/20/20 at 22:00; 

Stop 4/21/20 at 21:59;  Status DC


Fentanyl Citrate (Fentanyl 2ml Vial) 50 mcg PRN Q2HR  PRN IVP PAIN Last 

administered on 4/27/20at 13:32;  Start 4/20/20 at 21:00;  Stop 4/28/20 at 

12:53;  Status DC


Fentanyl Citrate (Fentanyl 2ml Vial) 25 mcg PRN Q2HR  PRN IVP PAIN;  Start 

4/20/20 at 21:00;  Stop 4/28/20 at 12:54;  Status DC


Enoxaparin Sodium (Lovenox 100mg Syringe) 100 mg Q12HR SQ ;  Start 4/21/20 at 

21:00;  Status UNV


Amino Acids/ Glycerin/ Electrolytes 1,000 ml @  75 mls/hr [id number] IV ;  Start 

4/20/20 at 21:15;  Status UNV


Sodium Chloride 1,000 ml @  1,000 mls/hr Q1H PRN IV hypotension;  Start 4/21/20 

at 07:56;  Stop 4/21/20 at 13:55;  Status DC


Albumin Human 200 ml @  200 mls/hr 1X PRN  PRN IV Hypotension Last administered 

on 4/21/20at 08:40;  Start 4/21/20 at 08:00;  Stop 4/21/20 at 13:59;  Status DC


Sodium Chloride 1,000 ml @  400 mls/hr Q2H30M PRN IV PATENCY;  Start 4/21/20 at 

07:56;  Stop 4/21/20 at 19:55;  Status DC


Info (PHARMACY MONITORING -- do not chart) 1 each PRN DAILY  PRN MC SEE 

COMMENTS;  Start 4/21/20 at 08:00;  Status UNV


Info (PHARMACY MONITORING -- do not chart) 1 each PRN DAILY  PRN MC SEE 

COMMENTS;  Start 4/21/20 at 08:00;  Status UNV


Daptomycin 430 mg/ Sodium Chloride 50 ml @  100 mls/hr Q24H IV  Last 

administered on 4/21/20at 12:35;  Start 4/21/20 at 09:00;  Stop 4/21/20 at 

12:49;  Status DC


Sodium Chloride 100 meq/Potassium Chloride 40 meq/ Magnesium Sulfate 20 

meq/Calcium Gluconate 15 meq/ Multivitamins 10 ml/Chromium/ Copper/Manganese/ 

Seleni/Zn 0.5 ml/ Insulin Human Regular 35 unit/ Total Parenteral 

Nutrition/Amino Acids/Dextrose/ Fat Emulsion Intravenous 1,400 ml @  58.333 mls/

hr TPN  CONT IV  Last administered on 4/21/20at 21:26;  Start 4/21/20 at 22:00; 

Stop 4/22/20 at 21:59;  Status DC


Daptomycin 430 mg/ Sodium Chloride 50 ml @  100 mls/hr Q48H IV ;  Start 4/23/20 

at 09:00;  Stop 4/22/20 at 11:55;  Status DC


Sodium Chloride 100 meq/Potassium Chloride 40 meq/ Magnesium Sulfate 20 

meq/Calcium Gluconate 15 meq/ Multivitamins 10 ml/Chromium/ Copper/Manganese/ 

Seleni/Zn 0.5 ml/ Insulin Human Regular 35 unit/ Total Parenteral 

Nutrition/Amino Acids/Dextrose/ Fat Emulsion Intravenous 1,400 ml @  58.333 mls/

hr TPN  CONT IV  Last administered on 4/22/20at 22:27;  Start 4/22/20 at 22:00; 

Stop 4/23/20 at 21:59;  Status DC


Daptomycin 430 mg/ Sodium Chloride 50 ml @  100 mls/hr Q24H IV  Last 

administered on 4/24/20at 15:07;  Start 4/22/20 at 13:00;  Stop 4/25/20 at 

13:15;  Status DC


Sodium Chloride 100 meq/Potassium Chloride 40 meq/ Magnesium Sulfate 20 

meq/Calcium Gluconate 10 meq/ Multivitamins 10 ml/Chromium/ Copper/Manganese/ 

Seleni/Zn 0.5 ml/ Insulin Human Regular 35 unit/ Total Parenteral Nutrition

/Amino Acids/Dextrose/ Fat Emulsion Intravenous 1,400 ml @  58.333 mls/ hr TPN  

CONT IV  Last administered on 4/24/20at 00:06;  Start 4/23/20 at 22:00;  Stop 

4/24/20 at 21:59;  Status DC


Alteplase, Recombinant (Cathflo For Central Catheter Clearance) 1 mg 1X  ONCE 

INT CAT  Last administered on 4/24/20at 11:44;  Start 4/24/20 at 10:45;  Stop 

4/24/20 at 10:46;  Status DC


Ondansetron HCl (Zofran) 4 mg PRN Q6HRS  PRN IV NAUSEA/VOMITING;  Start 4/27/20 

at 07:00;  Stop 4/28/20 at 06:59;  Status DC


Fentanyl Citrate (Fentanyl 2ml Vial) 25 mcg PRN Q5MIN  PRN IV MILD PAIN 1-3;  

Start 4/27/20 at 07:00;  Stop 4/28/20 at 06:59;  Status DC


Fentanyl Citrate (Fentanyl 2ml Vial) 50 mcg PRN Q5MIN  PRN IV MODERATE TO SEVERE

PAIN Last administered on 4/27/20at 10:17;  Start 4/27/20 at 07:00;  Stop 

4/28/20 at 06:59;  Status DC


Ringer's Solution 1,000 ml @  30 mls/hr Q24H IV ;  Start 4/27/20 at 07:00;  Stop

4/27/20 at 18:59;  Status DC


Lidocaine HCl (Xylocaine-Mpf 1% 2ml Vial) 2 ml PRN 1X  PRN ID PRIOR TO IV START;

 Start 4/27/20 at 07:00;  Stop 4/28/20 at 06:59;  Status DC


Prochlorperazine Edisylate (Compazine) 5 mg PACU PRN  PRN IV NAUSEA, MRX1;  

Start 4/27/20 at 07:00;  Stop 4/28/20 at 06:59;  Status DC


Sodium Acetate 50 meq/Potassium Acetate 55 meq/ Magnesium Sulfate 20 meq/Calcium

Gluconate 10 meq/ Multivitamins 10 ml/Chromium/ Copper/Manganese/ Seleni/Zn 0.5 

ml/ Insulin Human Regular 35 unit/ Total Parenteral Nutrition/Amino 

Acids/Dextrose/ Fat Emulsion Intravenous 1,400 ml @  58.333 mls/ hr TPN  CONT IV

;  Start 4/24/20 at 22:00;  Stop 4/24/20 at 14:15;  Status DC


Sodium Acetate 50 meq/Potassium Acetate 55 meq/ Magnesium Sulfate 20 meq/Calcium

Gluconate 10 meq/ Multivitamins 10 ml/Chromium/ Copper/Manganese/ Seleni/Zn 0.5 

ml/ Insulin Human Regular 35 unit/ Total Parenteral Nutrition/Amino 

Acids/Dextrose/ Fat Emulsion Intravenous 1,800 ml @  75 mls/hr TPN  CONT IV  

Last administered on 4/24/20at 22:38;  Start 4/24/20 at 22:00;  Stop 4/25/20 at 

21:59;  Status DC


Sodium Chloride 1,000 ml @  1,000 mls/hr Q1H PRN IV hypotension;  Start 4/24/20 

at 15:31;  Stop 4/24/20 at 21:30;  Status DC


Diphenhydramine HCl (Benadryl) 25 mg 1X PRN  PRN IV ITCHING;  Start 4/24/20 at 

15:45;  Stop 4/25/20 at 15:44;  Status DC


Diphenhydramine HCl (Benadryl) 25 mg 1X PRN  PRN IV ITCHING;  Start 4/24/20 at 

15:45;  Stop 4/25/20 at 15:44;  Status DC


Sodium Chloride 1,000 ml @  400 mls/hr Q2H30M PRN IV PATENCY;  Start 4/24/20 at 

15:31;  Stop 4/25/20 at 03:30;  Status DC


Info (PHARMACY MONITORING -- do not chart) 1 each PRN DAILY  PRN MC SEE 

COMMENTS;  Start 4/24/20 at 15:45


Sodium Acetate 50 meq/Potassium Acetate 55 meq/ Magnesium Sulfate 20 meq/Calcium

Gluconate 10 meq/ Multivitamins 10 ml/Chromium/ Copper/Manganese/ Seleni/Zn 0.5 

ml/ Insulin Human Regular 35 unit/ Total Parenteral Nutrition/Amino Acids/Dextro

se/ Fat Emulsion Intravenous 1,800 ml @  75 mls/hr TPN  CONT IV  Last 

administered on 4/25/20at 22:03;  Start 4/25/20 at 22:00;  Stop 4/26/20 at 

21:59;  Status DC


Daptomycin 430 mg/ Sodium Chloride 50 ml @  100 mls/hr Q24H IV  Last 

administered on 4/30/20at 13:00;  Start 4/25/20 at 13:00;  Stop 4/30/20 at 

20:58;  Status DC


Heparin Sodium (Porcine) 1000 unit/Sodium Chloride 1,001 ml @  1,001 mls/hr 1X  

ONCE IRR ;  Start 4/27/20 at 06:00;  Stop 4/27/20 at 06:59;  Status DC


Potassium Acetate 55 meq/Magnesium Sulfate 20 meq/ Calcium Gluconate 10 meq/ 

Multivitamins 10 ml/Chromium/ Copper/Manganese/ Seleni/Zn 0.5 ml/ Insulin Human 

Regular 35 unit/ Total Parenteral Nutrition/Amino Acids/Dextrose/ Fat Emulsion 

Intravenous 1,920 ml @  80 mls/hr TPN  CONT IV  Last administered on 4/26/20at 

22:10;  Start 4/26/20 at 22:00;  Stop 4/27/20 at 21:59;  Status DC


Dexamethasone Sodium Phosphate (Decadron) 4 mg STK-MED ONCE .ROUTE ;  Start 

4/27/20 at 10:56;  Stop 4/27/20 at 10:57;  Status DC


Ondansetron HCl (Zofran) 4 mg STK-MED ONCE .ROUTE ;  Start 4/27/20 at 10:56;  

Stop 4/27/20 at 10:57;  Status DC


Rocuronium Bromide (Zemuron) 50 mg STK-MED ONCE .ROUTE ;  Start 4/27/20 at 

10:56;  Stop 4/27/20 at 10:57;  Status DC


Fentanyl Citrate (Fentanyl 2ml Vial) 100 mcg STK-MED ONCE .ROUTE ;  Start 

4/27/20 at 10:56;  Stop 4/27/20 at 10:57;  Status DC


Bupivacaine HCl/ Epinephrine Bitart (Sensorcain-Epi 0.5%-1:810835 Mpf) 30 ml 

STK-MED ONCE .ROUTE  Last administered on 4/27/20at 12:01;  Start 4/27/20 at 

10:58;  Stop 4/27/20 at 10:58;  Status DC


Cellulose (Surgicel Hemostat 2x14) 1 each STK-MED ONCE .ROUTE ;  Start 4/27/20 

at 10:58;  Stop 4/27/20 at 10:59;  Status DC


Iohexol (Omnipaque 300 Mg/ml) 50 ml STK-MED ONCE .ROUTE ;  Start 4/27/20 at 

10:58;  Stop 4/27/20 at 10:59;  Status DC


Cellulose (Surgicel Hemostat 4x8) 1 each STK-MED ONCE .ROUTE ;  Start 4/27/20 at

10:58;  Stop 4/27/20 at 10:59;  Status DC


Bisacodyl (Dulcolax Supp) 10 mg STK-MED ONCE .ROUTE ;  Start 4/27/20 at 10:59;  

Stop 4/27/20 at 10:59;  Status DC


Heparin Sodium (Porcine) 1000 unit/Sodium Chloride 1,001 ml @  1,001 mls/hr 1X  

ONCE IRR ;  Start 4/27/20 at 12:00;  Stop 4/27/20 at 12:59;  Status DC


Propofol 20 ml @ As Directed STK-MED ONCE IV ;  Start 4/27/20 at 11:05;  Stop 

4/27/20 at 11:05;  Status DC


Sevoflurane (Ultane) 90 ml STK-MED ONCE IH ;  Start 4/27/20 at 11:05;  Stop 

4/27/20 at 11:05;  Status DC


Sevoflurane (Ultane) 60 ml STK-MED ONCE IH ;  Start 4/27/20 at 12:26;  Stop 

4/27/20 at 12:27;  Status DC


Propofol 20 ml @ As Directed STK-MED ONCE IV ;  Start 4/27/20 at 12:26;  Stop 

4/27/20 at 12:27;  Status DC


Phenylephrine HCl (PHENYLEPHRINE in 0.9% NACL PF) 1 mg STK-MED ONCE IV ;  Start 

4/27/20 at 12:34;  Stop 4/27/20 at 12:34;  Status DC


Heparin Sodium (Porcine) (Heparin Sodium) 5,000 unit Q12HR SQ  Last administered

on 5/4/20at 10:52;  Start 4/27/20 at 21:00


Sodium Chloride (Normal Saline Flush) 3 ml QSHIFT  PRN IV AFTER MEDS AND BLOOD 

DRAWS;  Start 4/27/20 at 13:45


Naloxone HCl (Narcan) 0.4 mg PRN Q2MIN  PRN IV SEE INSTRUCTIONS;  Start 4/27/20 

at 13:45


Sodium Chloride 1,000 ml @  25 mls/hr Q24H IV  Last administered on 5/3/20at 

19:49;  Start 4/27/20 at 13:37


Naloxone HCl (Narcan) 0.4 mg PRN Q2MIN  PRN IV SEE INSTRUCTIONS;  Start 4/27/20 

at 14:30;  Status UNV


Sodium Chloride 1,000 ml @  25 mls/hr Q24H IV ;  Start 4/27/20 at 14:30;  Status

UNV


Hydromorphone HCl 30 ml @ 0 mls/hr CONT PRN  PRN IV PER PROTOCOL Last 

administered on 5/2/20at 16:08;  Start 4/27/20 at 14:30;  Stop 5/4/20 at 08:55; 

Status DC


Potassium Acetate 55 meq/Magnesium Sulfate 20 meq/ Calcium Gluconate 10 meq/ Mu

ltivitamins 10 ml/Chromium/ Copper/Manganese/ Seleni/Zn 0.5 ml/ Insulin Human 

Regular 35 unit/ Total Parenteral Nutrition/Amino Acids/Dextrose/ Fat Emulsion 

Intravenous 1,920 ml @  80 mls/hr TPN  CONT IV  Last administered on 4/27/20at 

22:01;  Start 4/27/20 at 22:00;  Stop 4/28/20 at 21:59;  Status DC


Bumetanide (Bumex) 2 mg [id number] IV  Last administered on 5/1/20at 13:50;  Start 

4/28/20 at 14:00;  Stop 5/2/20 at 14:10;  Status DC


Meropenem 1 gm/ Sodium Chloride 100 ml @  200 mls/hr Q8HRS IV  Last administered

on 5/4/20at 14:38;  Start 4/28/20 at 14:00


Potassium Acetate 55 meq/Magnesium Sulfate 20 meq/ Calcium Gluconate 10 meq/ 

Multivitamins 10 ml/Chromium/ Copper/Manganese/ Seleni/Zn 0.5 ml/ Insulin Human 

Regular 35 unit/ Total Parenteral Nutrition/Amino Acids/Dextrose/ Fat Emulsion 

Intravenous 1,920 ml @  80 mls/hr TPN  CONT IV  Last administered on 4/28/20at 

22:02;  Start 4/28/20 at 22:00;  Stop 4/29/20 at 21:59;  Status DC


Hydromorphone HCl (Dilaudid Standard PCA) 12 mg STK-MED ONCE IV ;  Start 4/27/20

at 14:35;  Stop 4/28/20 at 13:53;  Status DC


Artificial Tears (Artificial Tears) 1 drop PRN Q15MIN  PRN OU DRY EYE Last 

administered on 5/1/20at 12:34;  Start 4/29/20 at 05:30


Hydromorphone HCl (Dilaudid Standard PCA) 12 mg STK-MED ONCE IV ;  Start 4/28/20

at 12:05;  Stop 4/29/20 at 09:15;  Status DC


Potassium Acetate 65 meq/Magnesium Sulfate 20 meq/ Calcium Gluconate 10 meq/ 

Multivitamins 10 ml/Chromium/ Copper/Manganese/ Seleni/Zn 0.5 ml/ Insulin Human 

Regular 30 unit/ Total Parenteral Nutrition/Amino Acids/Dextrose/ Fat Emulsion 

Intravenous 1,920 ml @  80 mls/hr TPN  CONT IV  Last administered on 4/29/20at 

22:22;  Start 4/29/20 at 22:00;  Stop 4/30/20 at 21:59;  Status DC


Cyclobenzaprine HCl (Flexeril) 10 mg PRN Q6HRS  PRN PO MUSCLE SPASMS;  Start 

4/30/20 at 10:45


Potassium Acetate 55 meq/Magnesium Sulfate 20 meq/ Calcium Gluconate 10 meq/ 

Multivitamins 10 ml/Chromium/ Copper/Manganese/ Seleni/Zn 0.5 ml/ Insulin Human 

Regular 30 unit/ Total Parenteral Nutrition/Amino Acids/Dextrose/ Fat Emulsion 

Intravenous 1,920 ml @  80 mls/hr TPN  CONT IV  Last administered on 5/1/20at 

01:00;  Start 4/30/20 at 22:00;  Stop 5/1/20 at 21:59;  Status DC


Magnesium Sulfate 50 ml @ 25 mls/hr 1X  ONCE IV  Last administered on 4/30/20at 

17:18;  Start 4/30/20 at 12:45;  Stop 4/30/20 at 14:44;  Status DC


Potassium Chloride/Water 100 ml @  100 mls/hr 1X  ONCE IV  Last administered on 

5/1/20at 11:27;  Start 5/1/20 at 12:00;  Stop 5/1/20 at 12:59;  Status DC


Hydromorphone HCl (Dilaudid Standard PCA) 12 mg STK-MED ONCE IV ;  Start 4/29/20

at 10:50;  Stop 5/1/20 at 11:02;  Status DC


Hydromorphone HCl (Dilaudid Standard PCA) 12 mg STK-MED ONCE IV ;  Start 4/30/20

at 13:47;  Stop 5/1/20 at 11:03;  Status DC


Potassium Acetate 30 meq/Magnesium Sulfate 20 meq/ Calcium Gluconate 10 meq/ 

Multivitamins 10 ml/Chromium/ Copper/Manganese/ Seleni/Zn 0.5 ml/ Insulin Human 

Regular 30 unit/ Potassium Chloride 30 meq/ Total Parenteral Nutrition/Amino 

Acids/Dextrose/ Fat Emulsion Intravenous 1,920 ml @  80 mls/hr TPN  CONT IV  

Last administered on 5/1/20at 22:34;  Start 5/1/20 at 22:00;  Stop 5/2/20 at 

21:59;  Status DC


Potassium Chloride/Water 100 ml @  100 mls/hr Q1H IV  Last administered on 

5/2/20at 13:05;  Start 5/2/20 at 07:00;  Stop 5/2/20 at 10:59;  Status DC


Magnesium Sulfate 50 ml @ 25 mls/hr 1X  ONCE IV  Last administered on 5/2/20at 

10:34;  Start 5/2/20 at 10:30;  Stop 5/2/20 at 12:29;  Status DC


Potassium Chloride 75 meq/ Magnesium Sulfate 20 meq/Calcium Gluconate 10 meq/ 

Multivitamins 10 ml/Chromium/ Copper/Manganese/ Seleni/Zn 0.5 ml/ Insulin Human 

Regular 30 unit/ Total Parenteral Nutrition/Amino Acids/Dextrose/ Fat Emulsion 

Intravenous 1,920 ml @  80 mls/hr TPN  CONT IV  Last administered on 5/2/20at 

21:51;  Start 5/2/20 at 22:00;  Stop 5/3/20 at 22:00;  Status DC


Potassium Chloride 75 meq/ Magnesium Sulfate 20 meq/Calcium Gluconate 10 meq/ 

Multivitamins 10 ml/Chromium/ Copper/Manganese/ Seleni/Zn 0.5 ml/ Insulin Human 

Regular 25 unit/ Total Parenteral Nutrition/Amino Acids/Dextrose/ Fat Emulsion 

Intravenous 1,920 ml @  80 mls/hr TPN  CONT IV  Last administered on 5/3/20at 

22:04;  Start 5/3/20 at 22:00;  Stop 5/4/20 at 21:59


Hydromorphone HCl (Dilaudid) 0.4 mg PRN Q4HRS  PRN IVP PAIN Last administered on

5/4/20at 10:57;  Start 5/4/20 at 09:00


Micafungin Sodium 100 mg/Dextrose 100 ml @  100 mls/hr Q24H IV  Last 

administered on 5/4/20at 10:48;  Start 5/4/20 at 11:00


Daptomycin 485 mg/ Sodium Chloride 50 ml @  100 mls/hr Q24H IV  Last 

administered on 5/4/20at 11:59;  Start 5/4/20 at 11:00


Potassium Chloride 75 meq/ Magnesium Sulfate 15 meq/Calcium Gluconate 8 meq/ 

Multivitamins 10 ml/Chromium/ Copper/Manganese/ Seleni/Zn 0.5 ml/ Insulin Human 

Regular 25 unit/ Total Parenteral Nutrition/Amino Acids/Dextrose/ Fat Emulsion 

Intravenous 1,920 ml @  80 mls/hr TPN  CONT IV ;  Start 5/4/20 at 22:00;  Stop 

5/5/20 at 21:59


Haloperidol Lactate (Haldol Inj) 3 mg 1X  ONCE IVP  Last administered on 

5/4/20at 14:37;  Start 5/4/20 at 14:30;  Stop 5/4/20 at 14:31;  Status DC





Active Scripts


Active


Reported


Bisoprolol Fumarate 5 Mg Tablet 10 Mg PO DAILY


Vitals/I & O





Vital Sign - Last 24 Hours








 5/3/20 5/3/20 5/3/20 5/3/20





 19:00 20:00 20:00 20:23


 


Temp  101.0  





  101.0  


 


Pulse 113 123  


 


Resp 23 32  


 


B/P (MAP) 157/75 (102) 137/69 (91)  


 


Pulse Ox 96 97  96


 


O2 Delivery C-pap 5peep/10PS C-pap 5peep/10PS Mechanical Ventilator Ventilator


 


    





    





 5/3/20 5/3/20 5/3/20 5/3/20





 21:00 21:51 22:00 23:00


 


Temp    100.6





    100.6


 


Pulse 119  113 130


 


Resp 28  30 42


 


B/P (MAP) 181/85 (117)  140/71 (94) 153/81 (105)


 


Pulse Ox 96 96 96 97


 


O2 Delivery C-pap 5peep/10PS Ventilator C-pap 5peep/10PS C-pap 5peep/10PS


 


    





    





 5/3/20 5/4/20 5/4/20 5/4/20





 23:18 00:00 00:00 01:00


 


Temp   100.9 





   100.9 


 


Pulse   118 110


 


Resp   31 30


 


B/P (MAP)   130/69 (89) 117/60 (79)


 


Pulse Ox 96  98 98


 


O2 Delivery Ventilator Mechanical Ventilator C-pap 5peep/10PS C-pap 5peep/10PS


 


    





    





 5/4/20 5/4/20 5/4/20 5/4/20





 01:01 02:00 03:00 04:00


 


Temp  100.3  100.3





  100.3  100.3


 


Pulse  113 100 104


 


Resp  34 28 32


 


B/P (MAP)  133/65 (87) 143/78 (99) 116/60 (78)


 


Pulse Ox 96 97 95 98


 


O2 Delivery Ventilator C-pap 5peep/10PS C-pap 5peep/10PS C-pap 5peep/10PS


 


    





    





 5/4/20 5/4/20 5/4/20 5/4/20





 04:00 04:20 05:00 06:00


 


Pulse   100 106


 


Resp   35 41


 


B/P (MAP)   115/68 (84) 120/63 (82)


 


Pulse Ox  97 98 96


 


O2 Delivery Mechanical Ventilator Ventilator C-pap 5peep/10PS C-pap 5peep/10PS





 5/4/20 5/4/20 5/4/20 5/4/20





 07:00 08:00 08:00 08:00


 


Temp    98.3





    98.3


 


Pulse 102   100


 


Resp 31   29


 


B/P (MAP) 175/84 (114)   104/60 (75)


 


Pulse Ox 96  97 98


 


O2 Delivery Ventilator Mechanical Ventilator Ventilator Ventilator


 


    





    





 5/4/20 5/4/20 5/4/20 5/4/20





 09:00 10:00 10:57 11:00


 


Pulse 110 104  114


 


Resp 36 26 45 32


 


B/P (MAP) 123/77 (92) 138/79 (98)  


 


Pulse Ox 98 98 98 99


 


O2 Delivery Ventilator Ventilator Ventilator Ventilator





 5/4/20 5/4/20 5/4/20 5/4/20





 11:27 12:00 12:00 12:11


 


Temp   99.4 





   99.4 


 


Pulse   140 


 


Resp   31 


 


B/P (MAP)   153/85 (107) 


 


Pulse Ox 100  100 99


 


O2 Delivery Ventilator Mechanical Ventilator Ventilator Tracheal Collar


 


O2 Flow Rate    10.0


 


    





    





 5/4/20 5/4/20 5/4/20 5/4/20





 13:00 14:00 15:00 15:40


 


Pulse 145 138 118 


 


Resp 40 36 25 


 


B/P (MAP) 184/95 (124) 138/65 (89) 118/97 (104) 


 


Pulse Ox 99 94 97 99


 


O2 Delivery Tracheal Collar Tracheal Collar Tracheal Collar Tracheal Collar


 


O2 Flow Rate 10.0 10.0 10.0 10.0





 5/4/20 5/4/20 5/4/20 5/4/20





 16:00 16:00 17:00 18:00


 


Temp  97.0  





  97.0  


 


Pulse  111 106 115


 


Resp  30 27 32


 


B/P (MAP)  117/65 (82) 113/58 (76) 140/67 (91)


 


Pulse Ox  99 98 100


 


O2 Delivery Trach Collar Tracheal Collar Tracheal Collar Tracheal Collar


 


O2 Flow Rate 10.0 10.0 10.0 10.0














Intake and Output   


 


 5/3/20 5/3/20 5/4/20





 15:00 23:00 07:00


 


Intake Total  341 ml 2464.8 ml


 


Output Total 550 ml 1080 ml 825 ml


 


Balance -550 ml -739 ml 1639.8 ml











Hemodynamically unstable?:  No


Is patient in severe pain?:  Yes


Is NPO status required?:  Yes











HIRAM BARRERA MD              May 4, 2020 18:36

## 2020-05-04 NOTE — RAD
EXAM: CT Abdomen and Pelvis without IV contrast

 

INDICATION: Follow-up necrotizing pancreatitis

 

TECHNIQUE: Multi-detector row CT images were acquired from the lung bases 

through the abdomen and pelvis without the use of IV contrast. Sagittal 

and coronal images were acquired from the transaxial data. All CT scans 

performed at this facility utilize dose optimization techniques as 

appropriate to the exam, including the following: Automated exposure 

control and adjustment of the mA and/or KV according to patient size (this

includes techniques or standardized protocols for targeted exams where 

dose is indication/reason for exam).

 

ORAL CONTRAST: None

 

COMPARISON: Abdomen and pelvis CT without IV contrast of April 22, 2020

 

FINDINGS: 

 

The absence of IV contrast limits evaluation of soft tissue pathology.

 

LOWER CHEST: Large bilateral pleural effusions with associated bibasilar 

atelectasis. Partially imaged central venous catheter with the tip near 

the cavoatrial junction. Bilateral breast prostheses. Coronary 

calcifications.

 

LIVER:  Unremarkable

 

BILIARY SYSTEM: Gallbladder contains a gallstone and is partly contracted.

Bile ducts are not dilated.

 

PANCREAS:  Obscured by large peripancreatic fluid collection with internal

debris which has increased in size in the interval, now measuring 24 x 8 

cm on axial image 34 series 2 this exam compared with 17 x 6 cm at the 

comparable level on the prior examination (image 43 of series 2 that 

exam).

 

SPLEEN:  Unremarkable

 

ADRENALS:  Unremarkable

 

KIDNEYS & URETERS:  Developing right great 2 hydronephrosis. No 

radiopaque stones.

 

BLADDER:  Obscured by large ascites

 

REPRODUCTIVE ORGANS:  Unremarkable

 

GASTROINTESTINAL: No findings of bowel obstruction, perforation or acute 

inflammation. Bowel contents show increased density and large bowel that 

could reflect dilute blood or residual of previous enteric contrast 

ingestion. The appendix is normal.

 

MESENTERY/PERITONEUM/RETROPERITONEUM: Trace drain has been placed within 

the ventral abdominal wall, with successful evacuation near completion of 

the previously evident ventral abdominal wall fluid collection. There 

remains a large, irregular fluid collection in the mesentery and right 

extraperitoneal soft tissues superficial to the right psoas muscle that 

appears slightly larger in the interval there is also slightly greater 

extraperitoneal fluid between the peritoneum and the left flank 

musculature.

 

VASCULAR:  Unremarkable

 

LYMPH NODES:  No adenopathy

 

OSSEOUS & SOFT TISSUES:  Unremarkable

 

IMPRESSION:

 

 

1. Findings consistent with sequelae of severe acute pancreatitis with 

enlarging peripancreatic and intra-abdominal fluid collections as 

described

 

2. Interval placement of a drain in the ventral abdominal wall with and 

partial evacuation of the ventral extraperitoneal fluid collection 

previously evident.

 

Electronically signed by: Adriana Forrest MD (5/4/2020 4:54 PM) 

[id number]

## 2020-05-05 VITALS — SYSTOLIC BLOOD PRESSURE: 127 MMHG | DIASTOLIC BLOOD PRESSURE: 68 MMHG

## 2020-05-05 VITALS — DIASTOLIC BLOOD PRESSURE: 54 MMHG | SYSTOLIC BLOOD PRESSURE: 100 MMHG

## 2020-05-05 VITALS — SYSTOLIC BLOOD PRESSURE: 145 MMHG | DIASTOLIC BLOOD PRESSURE: 74 MMHG

## 2020-05-05 VITALS — SYSTOLIC BLOOD PRESSURE: 115 MMHG | DIASTOLIC BLOOD PRESSURE: 63 MMHG

## 2020-05-05 VITALS — SYSTOLIC BLOOD PRESSURE: 152 MMHG | DIASTOLIC BLOOD PRESSURE: 88 MMHG

## 2020-05-05 VITALS — SYSTOLIC BLOOD PRESSURE: 155 MMHG | DIASTOLIC BLOOD PRESSURE: 79 MMHG

## 2020-05-05 VITALS — DIASTOLIC BLOOD PRESSURE: 89 MMHG | SYSTOLIC BLOOD PRESSURE: 172 MMHG

## 2020-05-05 VITALS — SYSTOLIC BLOOD PRESSURE: 94 MMHG | DIASTOLIC BLOOD PRESSURE: 56 MMHG

## 2020-05-05 VITALS — DIASTOLIC BLOOD PRESSURE: 48 MMHG | SYSTOLIC BLOOD PRESSURE: 89 MMHG

## 2020-05-05 VITALS — DIASTOLIC BLOOD PRESSURE: 64 MMHG | SYSTOLIC BLOOD PRESSURE: 125 MMHG

## 2020-05-05 VITALS — SYSTOLIC BLOOD PRESSURE: 117 MMHG | DIASTOLIC BLOOD PRESSURE: 60 MMHG

## 2020-05-05 VITALS — DIASTOLIC BLOOD PRESSURE: 95 MMHG | SYSTOLIC BLOOD PRESSURE: 156 MMHG

## 2020-05-05 VITALS — DIASTOLIC BLOOD PRESSURE: 96 MMHG | SYSTOLIC BLOOD PRESSURE: 122 MMHG

## 2020-05-05 VITALS — SYSTOLIC BLOOD PRESSURE: 119 MMHG | DIASTOLIC BLOOD PRESSURE: 63 MMHG

## 2020-05-05 VITALS — SYSTOLIC BLOOD PRESSURE: 164 MMHG | DIASTOLIC BLOOD PRESSURE: 94 MMHG

## 2020-05-05 VITALS — SYSTOLIC BLOOD PRESSURE: 95 MMHG | DIASTOLIC BLOOD PRESSURE: 54 MMHG

## 2020-05-05 VITALS — DIASTOLIC BLOOD PRESSURE: 66 MMHG | SYSTOLIC BLOOD PRESSURE: 127 MMHG

## 2020-05-05 VITALS — SYSTOLIC BLOOD PRESSURE: 99 MMHG | DIASTOLIC BLOOD PRESSURE: 58 MMHG

## 2020-05-05 VITALS — DIASTOLIC BLOOD PRESSURE: 63 MMHG | SYSTOLIC BLOOD PRESSURE: 132 MMHG

## 2020-05-05 VITALS — SYSTOLIC BLOOD PRESSURE: 127 MMHG | DIASTOLIC BLOOD PRESSURE: 59 MMHG

## 2020-05-05 VITALS — DIASTOLIC BLOOD PRESSURE: 71 MMHG | SYSTOLIC BLOOD PRESSURE: 139 MMHG

## 2020-05-05 VITALS — SYSTOLIC BLOOD PRESSURE: 169 MMHG | DIASTOLIC BLOOD PRESSURE: 86 MMHG

## 2020-05-05 VITALS — DIASTOLIC BLOOD PRESSURE: 86 MMHG | SYSTOLIC BLOOD PRESSURE: 138 MMHG

## 2020-05-05 VITALS — SYSTOLIC BLOOD PRESSURE: 145 MMHG | DIASTOLIC BLOOD PRESSURE: 83 MMHG

## 2020-05-05 LAB
ANION GAP SERPL CALC-SCNC: 6 MMOL/L (ref 6–14)
BASOPHILS # BLD AUTO: 0 X10^3/UL (ref 0–0.2)
BASOPHILS NFR BLD: 1 % (ref 0–3)
BUN SERPL-MCNC: 41 MG/DL (ref 7–20)
CALCIUM SERPL-MCNC: 8.6 MG/DL (ref 8.5–10.1)
CHLORIDE SERPL-SCNC: 114 MMOL/L (ref 98–107)
CO2 SERPL-SCNC: 33 MMOL/L (ref 21–32)
CREAT SERPL-MCNC: 0.9 MG/DL (ref 0.6–1)
EOSINOPHIL NFR BLD: 0.2 X10^3/UL (ref 0–0.7)
EOSINOPHIL NFR BLD: 3 % (ref 0–3)
ERYTHROCYTE [DISTWIDTH] IN BLOOD BY AUTOMATED COUNT: 18.5 % (ref 11.5–14.5)
GFR SERPLBLD BASED ON 1.73 SQ M-ARVRAT: 66.5 ML/MIN
GLUCOSE SERPL-MCNC: 104 MG/DL (ref 70–99)
HCT VFR BLD CALC: 23.2 % (ref 36–47)
HGB BLD-MCNC: 7.3 G/DL (ref 12–15.5)
LYMPHOCYTES # BLD: 1.2 X10^3/UL (ref 1–4.8)
LYMPHOCYTES NFR BLD AUTO: 17 % (ref 24–48)
MAGNESIUM SERPL-MCNC: 2 MG/DL (ref 1.8–2.4)
MCH RBC QN AUTO: 29 PG (ref 25–35)
MCHC RBC AUTO-ENTMCNC: 32 G/DL (ref 31–37)
MCV RBC AUTO: 91 FL (ref 79–100)
MONO #: 0.4 X10^3/UL (ref 0–1.1)
MONOCYTES NFR BLD: 5 % (ref 0–9)
NEUT #: 5.5 X10^3/UL (ref 1.8–7.7)
NEUTROPHILS NFR BLD AUTO: 75 % (ref 31–73)
PHOSPHATE SERPL-MCNC: 3.2 MG/DL (ref 2.6–4.7)
PLATELET # BLD AUTO: 370 X10^3/UL (ref 140–400)
POTASSIUM SERPL-SCNC: 4.1 MMOL/L (ref 3.5–5.1)
RBC # BLD AUTO: 2.55 X10^6/UL (ref 3.5–5.4)
SODIUM SERPL-SCNC: 153 MMOL/L (ref 136–145)
WBC # BLD AUTO: 7.3 X10^3/UL (ref 4–11)

## 2020-05-05 RX ADMIN — INSULIN LISPRO SCH UNITS: 100 INJECTION, SOLUTION INTRAVENOUS; SUBCUTANEOUS at 18:00

## 2020-05-05 RX ADMIN — BACITRACIN SCH MLS/HR: 5000 INJECTION, POWDER, FOR SOLUTION INTRAMUSCULAR at 13:37

## 2020-05-05 RX ADMIN — HYDROMORPHONE HYDROCHLORIDE PRN MG: 2 INJECTION INTRAMUSCULAR; INTRAVENOUS; SUBCUTANEOUS at 19:38

## 2020-05-05 RX ADMIN — Medication PRN EACH: at 11:41

## 2020-05-05 RX ADMIN — DAPTOMYCIN SCH MLS/HR: 500 INJECTION, POWDER, LYOPHILIZED, FOR SOLUTION INTRAVENOUS at 11:28

## 2020-05-05 RX ADMIN — DEXMEDETOMIDINE HYDROCHLORIDE PRN MLS/HR: 100 INJECTION, SOLUTION, CONCENTRATE INTRAVENOUS at 00:13

## 2020-05-05 RX ADMIN — MEROPENEM SCH MLS/HR: 1 INJECTION, POWDER, FOR SOLUTION INTRAVENOUS at 06:10

## 2020-05-05 RX ADMIN — DEXTRAN 70, GLYCERIN, HYPROMELLOSE PRN DROP: 1; 2; 3 SOLUTION/ DROPS OPHTHALMIC at 09:10

## 2020-05-05 RX ADMIN — INSULIN LISPRO SCH UNITS: 100 INJECTION, SOLUTION INTRAVENOUS; SUBCUTANEOUS at 00:00

## 2020-05-05 RX ADMIN — HYDROMORPHONE HYDROCHLORIDE PRN MG: 2 INJECTION INTRAMUSCULAR; INTRAVENOUS; SUBCUTANEOUS at 09:11

## 2020-05-05 RX ADMIN — ONDANSETRON PRN MG: 2 INJECTION INTRAMUSCULAR; INTRAVENOUS at 15:33

## 2020-05-05 RX ADMIN — MEROPENEM SCH MLS/HR: 1 INJECTION, POWDER, FOR SOLUTION INTRAVENOUS at 22:02

## 2020-05-05 RX ADMIN — HYDROMORPHONE HYDROCHLORIDE PRN MG: 2 INJECTION INTRAMUSCULAR; INTRAVENOUS; SUBCUTANEOUS at 02:32

## 2020-05-05 RX ADMIN — HYDROMORPHONE HYDROCHLORIDE PRN MG: 2 INJECTION INTRAMUSCULAR; INTRAVENOUS; SUBCUTANEOUS at 15:32

## 2020-05-05 RX ADMIN — INSULIN LISPRO SCH UNITS: 100 INJECTION, SOLUTION INTRAVENOUS; SUBCUTANEOUS at 06:00

## 2020-05-05 RX ADMIN — DEXMEDETOMIDINE HYDROCHLORIDE PRN MLS/HR: 100 INJECTION, SOLUTION, CONCENTRATE INTRAVENOUS at 06:46

## 2020-05-05 RX ADMIN — DEXMEDETOMIDINE HYDROCHLORIDE PRN MLS/HR: 100 INJECTION, SOLUTION, CONCENTRATE INTRAVENOUS at 11:11

## 2020-05-05 RX ADMIN — HEPARIN SODIUM SCH UNIT: 5000 INJECTION, SOLUTION INTRAVENOUS; SUBCUTANEOUS at 09:12

## 2020-05-05 RX ADMIN — HEPARIN SODIUM SCH UNIT: 5000 INJECTION, SOLUTION INTRAVENOUS; SUBCUTANEOUS at 22:04

## 2020-05-05 RX ADMIN — MEROPENEM SCH MLS/HR: 1 INJECTION, POWDER, FOR SOLUTION INTRAVENOUS at 14:04

## 2020-05-05 RX ADMIN — DEXMEDETOMIDINE HYDROCHLORIDE PRN MLS/HR: 100 INJECTION, SOLUTION, CONCENTRATE INTRAVENOUS at 20:19

## 2020-05-05 RX ADMIN — INSULIN LISPRO SCH UNITS: 100 INJECTION, SOLUTION INTRAVENOUS; SUBCUTANEOUS at 12:00

## 2020-05-05 RX ADMIN — PANTOPRAZOLE SODIUM SCH MG: 40 INJECTION, POWDER, FOR SOLUTION INTRAVENOUS at 09:11

## 2020-05-05 RX ADMIN — ACETAMINOPHEN PRN MG: 650 SUPPOSITORY RECTAL at 09:12

## 2020-05-05 RX ADMIN — DEXTROSE SCH MLS/HR: 50 INJECTION, SOLUTION INTRAVENOUS at 12:21

## 2020-05-05 RX ADMIN — DEXMEDETOMIDINE HYDROCHLORIDE PRN MLS/HR: 100 INJECTION, SOLUTION, CONCENTRATE INTRAVENOUS at 15:38

## 2020-05-05 NOTE — PDOC
SUBJECTIVE


ROS


stable





OBJECTIVE


Vital Signs





Vital Signs








  Date Time  Temp Pulse Resp B/P (MAP) Pulse Ox O2 Delivery O2 Flow Rate FiO2


 


5/5/20 09:11   35  95 Tracheal Collar 10.0 


 


5/5/20 06:00  91  94/56 (69)    


 


5/5/20 04:00 97.5       





 97.5       








I & 0











Intake and Output 


 


 5/5/20





 07:00


 


Intake Total 3052 ml


 


Output Total 1940 ml


 


Balance 1112 ml


 


 


 


Intake Oral 0 ml


 


IV Total 3052 ml


 


Output Urine Total 1900 ml


 


Drainage Total 40 ml











PHYSICAL EXAM


Physical Exam


GENERAL: NAD  


HEENT:  oral cavity dry 


NECK:  Trach/vent 


LUNGS: Non labored  


HEART:  S1, S2, regular 


ABDOMEN: Distended, hypoactive BS, drain placement (4/27 )


: [place] (4/14)


EXTREMITIES: Generalized edema,


DERMATOLOGIC:  Warm and dry.  No generalized rash.





DIAGNOSIS/ASSESSMENT


Assessment & Plan


ARF-- ATN,requiring CRRT and HD 


Off HD, renal function improved  Excellent UOP, dced  IV Diuretics  on 5/2 


 currently on TPN,, avoid nephrotoxins


CT 5/4-?  Developing right great 2 hydronephrosis. No  radiopaque stones.bladder

obscured by ascites  


  


Anemia-  Currently on YOLI 





Hypernatremia -- DCed Bumex ,   





HyPokalemia- replace as needed 





Anasarca due to 3rd spacing , Prolonged Hospitalization   stable  





Hyperkalemia- resolved  





Acute pancreatitis with persistent  necrosis


 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 

the pancreas


   4/27 status post ROBERT drain placement; 





 Cholelithiasis with possible cholecystitis.





Moderate pleural effusions, may be slightly improved. Infiltrate/atelectasis in 

both lung bases.


  


 Ascites - post paracentesis  in the past


US this am, abdomen appears distended  





Acute hypoxic resp failure ,bilateral pleural effusion


  Trach in place , On Vent  


 


HTN 





COVID-19 neg, 3/26





COMMENT/RELEVANT DATA


Meds





Current Medications








 Medications


  (Trade)  Dose


 Ordered  Sig/Yee  Start Time


 Stop Time Status Last Admin


Dose Admin


 


 Acetaminophen


  (Tylenol Supp)  650 mg  PRN Q6HRS  PRN  3/24/20 10:30


    5/5/20 09:12


650 MG


 


 Acetaminophen


  (Tylenol)  650 mg  PRN Q6HRS  PRN  3/21/20 03:36


    4/16/20 19:56


650 MG


 


 Albumin Human  200 ml @ 


 200 mls/hr  1X PRN  PRN  4/21/20 08:00


 4/21/20 13:59 DC 4/21/20 08:40


200 MLS/HR


 


 Albuterol Sulfate


  (Ventolin Neb


 Soln)  2.5 mg  1X  ONCE  3/17/20 22:30


 3/17/20 22:31 DC 3/18/20 00:56


2.5 MG


 


 Alteplase,


 Recombinant


  (Cathflo For


 Central Catheter


 Clearance)  1 mg  1X  ONCE  4/24/20 10:45


 4/24/20 10:46 DC 4/24/20 11:44


1 MG


 


 Amino Acids/


 Glycerin/


 Electrolytes  1,000 ml @ 


 75 mls/hr  [id number]  4/20/20 21:15


   UNV  





 


 Artificial Tears


  (Artificial


 Tears)  1 drop  PRN Q15MIN  PRN  4/29/20 05:30


    5/5/20 09:10


1 DROP


 


 Atenolol


  (Tenormin)  100 mg  DAILY  3/17/20 09:00


 3/16/20 20:08 DC  





 


 Atropine Sulfate


  (ATROPINE 0.5mg


 SYRINGE)  0.5 mg  PRN Q5MIN  PRN  4/2/20 08:15


     





 


 Benzocaine


  (Hurricaine One)  1 spray  1X  ONCE  3/20/20 14:30


 3/20/20 14:31 DC 3/20/20 16:38


1 SPRAY


 


 Bisacodyl


  (Dulcolax Supp)  10 mg  STK-MED ONCE  4/27/20 10:59


 4/27/20 10:59 DC  





 


 Bumetanide


  (Bumex)  2 mg  [id number]  4/28/20 14:00


 5/2/20 14:10 DC 5/1/20 13:50


2 MG


 


 Bupivacaine HCl/


 Epinephrine Bitart


  (Sensorcain-Epi


 0.5%-1:824694 Mpf)  30 ml  STK-MED ONCE  4/27/20 10:58


 4/27/20 10:58 DC 4/27/20 12:01


7 ML


 


 Calcium Carbonate/


 Glycine


  (Tums)  500 mg  PRN [id number]  PRN  3/18/20 17:45


     





 


 Calcium Chloride


 1000 mg/Sodium


 Chloride  110 ml @ 


 220 mls/hr  1X  ONCE  3/17/20 22:30


 3/17/20 22:59 DC 3/17/20 22:11


220 MLS/HR


 


 Calcium Chloride


 3000 mg/Sodium


 Chloride  1,030 ml @ 


 50 mls/hr  [id number]  3/19/20 08:00


 3/21/20 15:23 DC 3/21/20 02:17


50 MLS/HR


 


 Calcium Gluconate


  (Calcium


 Gluconate)  2,000 mg  1X  ONCE  3/19/20 02:15


 3/19/20 02:16 DC 3/19/20 02:19


2,000 MG


 


 Calcium Gluconate


 1000 mg/Sodium


 Chloride  110 ml @ 


 220 mls/hr  1X  ONCE  3/18/20 03:30


 3/18/20 03:59 DC 3/18/20 03:21


220 MLS/HR


 


 Calcium Gluconate


 2000 mg/Sodium


 Chloride  120 ml @ 


 220 mls/hr  1X  ONCE  3/18/20 07:30


 3/18/20 08:02 DC 3/18/20 09:05


220 MLS/HR


 


 Cefepime HCl


  (Maxipime)  2 gm  Q12HR  3/25/20 09:00


 4/8/20 09:58 DC 4/7/20 20:56


2 GM


 


 Cellulose


  (Surgicel


 Fibrillar 1x2)  1 each  STK-MED ONCE  4/6/20 11:00


 4/6/20 11:01 DC  





 


 Cellulose


  (Surgicel


 Hemostat 2x14)  1 each  STK-MED ONCE  4/27/20 10:58


 4/27/20 10:59 DC  





 


 Cellulose


  (Surgicel


 Hemostat 4x8)  1 each  STK-MED ONCE  4/27/20 10:58


 4/27/20 10:59 DC  





 


 Cyclobenzaprine


 HCl


  (Flexeril)  10 mg  PRN Q6HRS  PRN  4/30/20 10:45


     





 


 Daptomycin 430 mg/


 Sodium Chloride  50 ml @ 


 100 mls/hr  Q24H  4/25/20 13:00


 4/30/20 20:58 DC 4/30/20 13:00


100 MLS/HR


 


 Daptomycin 485 mg/


 Sodium Chloride  50 ml @ 


 100 mls/hr  Q24H  5/4/20 11:00


    5/4/20 11:59


100 MLS/HR


 


 Daptomycin 500 mg/


 Sodium Chloride  50 ml @ 


 100 mls/hr  Q48H  3/25/20 08:30


 4/10/20 10:07 DC 4/10/20 09:57


100 MLS/HR


 


 Dexamethasone


 Sodium Phosphate


  (Decadron)  4 mg  STK-MED ONCE  4/27/20 10:56


 4/27/20 10:57 DC  





 


 Dexmedetomidine


 HCl 400 mcg/


 Sodium Chloride  100 ml @ 0


 mls/hr  CONT  PRN  4/2/20 08:15


    5/5/20 06:46


16.7 MLS/HR


 


 Dextrose


  (Dextrose


 50%-Water Syringe)  12.5 gm  PRN Q15MIN  PRN  3/16/20 09:30


     





 


 Digoxin


  (Lanoxin)  125 mcg  1X  ONCE  3/19/20 18:00


 3/19/20 18:01 DC 3/19/20 17:10


125 MCG


 


 Diphenhydramine


 HCl


  (Benadryl)  25 mg  1X PRN  PRN  4/24/20 15:45


 4/25/20 15:44 DC  





 


 Enoxaparin Sodium


  (Lovenox 100mg


 Syringe)  100 mg  Q12HR  4/21/20 21:00


   UNV  





 


 Etomidate


  (Amidate)  8 mg  1X  ONCE  3/23/20 08:30


 3/23/20 08:31 DC 3/23/20 08:33


8 MG


 


 Fentanyl Citrate


  (Fentanyl 2ml


 Vial)  100 mcg  STK-MED ONCE  4/27/20 10:56


 4/27/20 10:57 DC  





 


 Furosemide


  (Lasix)  40 mg  1X  ONCE  4/13/20 14:30


 4/13/20 14:31 DC 4/13/20 14:39


40 MG


 


 Haloperidol


 Lactate


  (Haldol Inj)  3 mg  1X  ONCE  5/4/20 14:30


 5/4/20 14:31 DC 5/4/20 14:37


3 MG


 


 Heparin Sodium


  (Porcine)


  (Hep Lock Adult)  500 unit  STK-MED ONCE  4/7/20 09:29


 4/7/20 09:30 DC  





 


 Heparin Sodium


  (Porcine)


  (Heparin Sodium)  5,000 unit  Q12HR  4/27/20 21:00


    5/5/20 09:12


5,000 UNIT


 


 Heparin Sodium


  (Porcine) 1000


 unit/Sodium


 Chloride  1,001 ml @ 


 1,001 mls/hr  1X  ONCE  4/27/20 12:00


 4/27/20 12:59 DC  





 


 Hydromorphone HCl


  (Dilaudid


 Standard PCA)  12 mg  STK-MED ONCE  4/30/20 13:47


 5/1/20 11:03 DC  





 


 Hydromorphone HCl


  (Dilaudid)  1 mg  PRN Q4HRS  PRN  5/4/20 19:00


    5/5/20 09:11


1 MG


 


 Info


  (CONTRAST GIVEN


 -- Rx MONITORING)  1 each  PRN DAILY  PRN  3/30/20 11:45


 4/1/20 11:44 DC  





 


 Info


  (Icu Electrolyte


 Protocol)  1 ea  CONT PRN  PRN  3/29/20 13:15


     





 


 Info


  (PHARMACY


 MONITORING -- do


 not chart)  1 each  PRN DAILY  PRN  4/24/20 15:45


     





 


 Info


  (Tpn Per


 Pharmacy)  1 each  PRN DAILY  PRN  3/18/20 12:30


   UNV  





 


 Insulin Human


 Lispro


  (HumaLOG)  0-9 UNITS  Q6HRS  3/16/20 09:30


    5/5/20 00:00


4 UNITS


 


 Insulin Human


 Regular


  (HumuLIN R VIAL)  5 unit  1X  ONCE  3/17/20 22:30


 3/17/20 22:31 DC 3/17/20 22:14


5 UNIT


 


 Iohexol


  (Omnipaque 240


 Mg/ml)  30 ml  1X  ONCE  3/30/20 11:30


 3/30/20 11:33 DC 3/30/20 11:30


30 ML


 


 Iohexol


  (Omnipaque 300


 Mg/ml)  50 ml  STK-MED ONCE  4/27/20 10:58


 4/27/20 10:59 DC  





 


 Iohexol


  (Omnipaque 350


 Mg/ml)  90 ml  1X  ONCE  3/16/20 03:30


 3/16/20 03:31 DC 3/16/20 03:25


90 ML


 


 Ketorolac


 Tromethamine


  (Toradol 30mg


 Vial)  30 mg  1X  ONCE  3/16/20 03:00


 3/16/20 03:01 DC 3/16/20 02:54


30 MG


 


 Lidocaine HCl


  (Buffered


 Lidocaine 1%)  6 ml  1X  ONCE  4/15/20 13:30


 4/15/20 13:31 DC 4/15/20 13:45


9 ML


 


 Lidocaine HCl


  (Glydo


  (Lidocaine) Jelly)  1 thomas  1X  ONCE  3/20/20 14:30


 3/20/20 14:31 DC 3/20/20 16:38


1 THOMAS


 


 Lidocaine HCl


  (Xylocaine-Mpf


 1% 2ml Vial)  2 ml  PRN 1X  PRN  4/27/20 07:00


 4/28/20 06:59 DC  





 


 Linezolid/Dextrose  300 ml @ 


 300 mls/hr  Q12HR  4/10/20 11:00


 4/21/20 08:10 DC 4/20/20 20:40


300 MLS/HR


 


 Lorazepam


  (Ativan Inj)  1 mg  PRN Q4HRS  PRN  3/19/20 09:00


 4/17/20 09:19 DC 4/17/20 03:51


1 MG


 


 Magnesium Sulfate  50 ml @ 25


 mls/hr  1X  ONCE  5/2/20 10:30


 5/2/20 12:29 DC 5/2/20 10:34


25 MLS/HR


 


 Meropenem 1 gm/


 Sodium Chloride  100 ml @ 


 200 mls/hr  Q8HRS  4/28/20 14:00


    5/5/20 06:10


200 MLS/HR


 


 Meropenem 500 mg/


 Sodium Chloride  50 ml @ 


 100 mls/hr  Q12H  4/8/20 10:00


 4/28/20 12:37 DC 4/28/20 10:45


100 MLS/HR


 


 Metoprolol


 Tartrate


  (Lopressor Vial)  5 mg  Q6HRS  3/17/20 10:15


 3/28/20 08:48 DC 3/26/20 00:12


5 MG


 


 Metronidazole  100 ml @ 


 100 mls/hr  Q8HRS  4/14/20 10:00


 4/21/20 08:10 DC 4/21/20 06:04


100 MLS/HR


 


 Micafungin Sodium


 100 mg/Dextrose  100 ml @ 


 100 mls/hr  Q24H  5/4/20 11:00


    5/4/20 10:48


100 MLS/HR


 


 Midazolam HCl


  (Versed)  5 mg  1X  ONCE  3/23/20 08:30


 3/23/20 08:31 DC  





 


 Midazolam HCl 100


 mg/Sodium Chloride  100 ml @ 7


 mls/hr  CONT  PRN  3/28/20 16:00


    4/8/20 15:35


7 MLS/HR


 


 Midazolam HCl 50


 mg/Sodium Chloride  50 ml @ 0


 mls/hr  CONT  PRN  3/23/20 08:15


 3/28/20 15:59 DC 3/26/20 22:39


7 MLS/HR


 


 Morphine Sulfate


  (Morphine


 Sulfate)  2 mg  PRN Q2HR  PRN  3/16/20 05:00


 3/17/20 14:15 DC 3/17/20 12:26


2 MG


 


 Multi-Ingred


 Cream/Lotion/Oil/


 Oint


  (Artificial


 Tears Eye


 Ointment)  1 thomas  PRN Q1HR  PRN  3/25/20 17:30


    4/13/20 08:19


1 THOMAS


 


 Naloxone HCl


  (Narcan)  0.4 mg  PRN Q2MIN  PRN  4/27/20 14:30


   UNV  





 


 Norepinephrine


 Bitartrate 8 mg/


 Dextrose  258 ml @ 


 17.299 mls/


 hr  CONT  PRN  3/17/20 15:30


 4/17/20 09:19 DC 4/14/20 12:48


20.9 MLS/HR


 


 Ondansetron HCl


  (Zofran)  4 mg  STK-MED ONCE  4/27/20 10:56


 4/27/20 10:57 DC  





 


 Pantoprazole


 Sodium


  (PROTONIX VIAL


 for IV PUSH)  40 mg  DAILYAC  3/16/20 11:30


    5/5/20 09:11


40 MG


 


 Phenylephrine HCl


  (PHENYLEPHRINE


 in 0.9% NACL PF)  1 mg  STK-MED ONCE  4/27/20 12:34


 4/27/20 12:34 DC  





 


 Piperacillin Sod/


 Tazobactam Sod


 4.5 gm/Sodium


 Chloride  100 ml @ 


 200 mls/hr  1X  ONCE  3/16/20 06:00


 3/16/20 06:29 DC 3/16/20 05:44


200 MLS/HR


 


 Potassium


 Chloride 15 meq/


 Bicarbonate


 Dialysis Soln w/


 out KCl  5,007.5 ml


  @ 1,000 mls/


 hr  Q5H1M  3/29/20 20:00


 4/2/20 13:08 DC 4/1/20 18:14


1,000 MLS/HR


 


 Potassium


 Chloride 20 meq/


 Bicarbonate


 Dialysis Soln w/


 out KCl  5,010 ml @ 


 1,000 mls/hr  Q5H1M  3/25/20 16:00


 3/29/20 19:59 DC 3/29/20 14:54


1,000 MLS/HR


 


 Potassium


 Chloride 75 meq/


 Magnesium Sulfate


 15 meq/Calcium


 Gluconate 8 meq/


 Multivitamins 10


 ml/Chromium/


 Copper/Manganese/


 Seleni/Zn 0.5 ml/


 Insulin Human


 Regular 25 unit/


 Total Parenteral


 Nutrition/Amino


 Acids/Dextrose/


 Fat Emulsion


 Intravenous  1,920 ml @ 


 80 mls/hr  TPN  CONT  5/4/20 22:00


 5/5/20 21:59  5/4/20 23:08


80 MLS/HR


 


 Potassium


 Chloride 75 meq/


 Magnesium Sulfate


 20 meq/Calcium


 Gluconate 10 meq/


 Multivitamins 10


 ml/Chromium/


 Copper/Manganese/


 Seleni/Zn 0.5 ml/


 Insulin Human


 Regular 25 unit/


 Total Parenteral


 Nutrition/Amino


 Acids/Dextrose/


 Fat Emulsion


 Intravenous  1,920 ml @ 


 80 mls/hr  TPN  CONT  5/3/20 22:00


 5/4/20 21:59 DC 5/3/20 22:04


80 MLS/HR


 


 Potassium


 Chloride 75 meq/


 Magnesium Sulfate


 20 meq/Calcium


 Gluconate 10 meq/


 Multivitamins 10


 ml/Chromium/


 Copper/Manganese/


 Seleni/Zn 0.5 ml/


 Insulin Human


 Regular 30 unit/


 Total Parenteral


 Nutrition/Amino


 Acids/Dextrose/


 Fat Emulsion


 Intravenous  1,920 ml @ 


 80 mls/hr  TPN  CONT  5/2/20 22:00


 5/3/20 22:00 DC 5/2/20 21:51


80 MLS/HR


 


 Potassium


 Chloride/Water  100 ml @ 


 100 mls/hr  Q1H  5/2/20 07:00


 5/2/20 10:59 DC 5/2/20 13:05


100 MLS/HR


 


 Potassium


 Phosphate 20 mmol/


 Sodium Chloride  106.6667


 ml @ 


 51.667 m...  1X  ONCE  3/25/20 13:00


 3/25/20 15:03 DC 3/25/20 12:51


51.667 MLS/HR


 


 Potassium Acetate


 30 meq/Magnesium


 Sulfate 20 meq/


 Calcium Gluconate


 10 meq/


 Multivitamins 10


 ml/Chromium/


 Copper/Manganese/


 Seleni/Zn 0.5 ml/


 Insulin Human


 Regular 30 unit/


 Potassium


 Chloride 30 meq/


 Total Parenteral


 Nutrition/Amino


 Acids/Dextrose/


 Fat Emulsion


 Intravenous  1,920 ml @ 


 80 mls/hr  TPN  CONT  5/1/20 22:00


 5/2/20 21:59 DC 5/1/20 22:34


80 MLS/HR


 


 Potassium Acetate


 55 meq/Magnesium


 Sulfate 20 meq/


 Calcium Gluconate


 10 meq/


 Multivitamins 10


 ml/Chromium/


 Copper/Manganese/


 Seleni/Zn 0.5 ml/


 Insulin Human


 Regular 30 unit/


 Total Parenteral


 Nutrition/Amino


 Acids/Dextrose/


 Fat Emulsion


 Intravenous  1,920 ml @ 


 80 mls/hr  TPN  CONT  4/30/20 22:00


 5/1/20 21:59 DC 5/1/20 01:00


80 MLS/HR


 


 Potassium Acetate


 55 meq/Magnesium


 Sulfate 20 meq/


 Calcium Gluconate


 10 meq/


 Multivitamins 10


 ml/Chromium/


 Copper/Manganese/


 Seleni/Zn 0.5 ml/


 Insulin Human


 Regular 35 unit/


 Total Parenteral


 Nutrition/Amino


 Acids/Dextrose/


 Fat Emulsion


 Intravenous  1,920 ml @ 


 80 mls/hr  TPN  CONT  4/28/20 22:00


 4/29/20 21:59 DC 4/28/20 22:02


80 MLS/HR


 


 Potassium Acetate


 65 meq/Magnesium


 Sulfate 20 meq/


 Calcium Gluconate


 10 meq/


 Multivitamins 10


 ml/Chromium/


 Copper/Manganese/


 Seleni/Zn 0.5 ml/


 Insulin Human


 Regular 30 unit/


 Total Parenteral


 Nutrition/Amino


 Acids/Dextrose/


 Fat Emulsion


 Intravenous  1,920 ml @ 


 80 mls/hr  TPN  CONT  4/29/20 22:00


 4/30/20 21:59 DC 4/29/20 22:22


80 MLS/HR


 


 Prochlorperazine


 Edisylate


  (Compazine)  5 mg  PACU PRN  PRN  4/27/20 07:00


 4/28/20 06:59 DC  





 


 Propofol  20 ml @ As


 Directed  STK-MED ONCE  4/27/20 12:26


 4/27/20 12:27 DC  





 


 Ringer's Solution  1,000 ml @ 


 30 mls/hr  Q24H  4/27/20 07:00


 4/27/20 18:59 DC  





 


 Rocuronium Bromide


  (Zemuron)  50 mg  STK-MED ONCE  4/27/20 10:56


 4/27/20 10:57 DC  





 


 Sevoflurane


  (Ultane)  60 ml  STK-MED ONCE  4/27/20 12:26


 4/27/20 12:27 DC  





 


 Sodium


 Bicarbonate 50


 meq/Sodium


 Chloride  1,050 ml @ 


 75 mls/hr  Q14H  3/18/20 07:30


 3/23/20 10:28 DC 3/22/20 21:10


75 MLS/HR


 


 Sodium Acetate 50


 meq/Potassium


 Acetate 55 meq/


 Magnesium Sulfate


 20 meq/Calcium


 Gluconate 10 meq/


 Multivitamins 10


 ml/Chromium/


 Copper/Manganese/


 Seleni/Zn 0.5 ml/


 Insulin Human


 Regular 35 unit/


 Total Parenteral


 Nutrition/Amino


 Acids/Dextrose/


 Fat Emulsion


 Intravenous  1,800 ml @ 


 75 mls/hr  TPN  CONT  4/25/20 22:00


 4/26/20 21:59 DC 4/25/20 22:03


75 MLS/HR


 


 Sodium Chloride  1,000 ml @ 


 25 mls/hr  Q24H  4/27/20 14:30


   UNV  





 


 Sodium Chloride


  (Normal Saline


 Flush)  3 ml  QSHIFT  PRN  4/27/20 13:45


     





 


 Sodium Chloride


 90 meq/Calcium


 Gluconate 10 meq/


 Multivitamins 10


 ml/Chromium/


 Copper/Manganese/


 Seleni/Zn 0.5 ml/


 Total Parenteral


 Nutrition/Amino


 Acids/Dextrose/


 Fat Emulsion


 Intravenous  1,512 ml @ 


 63 mls/hr  TPN  CONT  3/18/20 22:00


 3/19/20 21:59 DC 3/18/20 22:06


63 MLS/HR


 


 Sodium Chloride


 90 meq/Calcium


 Gluconate 10 meq/


 Multivitamins 10


 ml/Chromium/


 Copper/Manganese/


 Seleni/Zn 1 ml/


 Total Parenteral


 Nutrition/Amino


 Acids/Dextrose/


 Fat Emulsion


 Intravenous  55.005 ml


  @ 2.292


 mls/hr  TPN  CONT  3/18/20 22:00


 3/18/20 12:33 DC  





 


 Sodium Chloride


 90 meq/Magnesium


 Sulfate 10 meq/


 Calcium Gluconate


 20 meq/


 Multivitamins 10


 ml/Chromium/


 Copper/Manganese/


 Seleni/Zn 0.5 ml/


 Total Parenteral


 Nutrition/Amino


 Acids/Dextrose/


 Fat Emulsion


 Intravenous  1,512 ml @ 


 63 mls/hr  TPN  CONT  3/19/20 22:00


 3/20/20 21:59 DC 3/19/20 22:25


63 MLS/HR


 


 Sodium Chloride


 90 meq/Magnesium


 Sulfate 12 meq/


 Calcium Gluconate


 15 meq/


 Multivitamins 10


 ml/Chromium/


 Copper/Manganese/


 Seleni/Zn 0.5 ml/


 Insulin Human


 Regular 25 unit/


 Total Parenteral


 Nutrition/Amino


 Acids/Dextrose/


 Fat Emulsion


 Intravenous  1,400 ml @ 


 58.333 mls/


 hr  TPN  CONT  4/8/20 22:00


 4/9/20 21:59 DC 4/8/20 21:41


58.333 MLS/HR


 


 Sodium Chloride


 90 meq/Potassium


 Chloride 15 meq/


 Magnesium Sulfate


 12 meq/Calcium


 Gluconate 15 meq/


 Multivitamins 10


 ml/Chromium/


 Copper/Manganese/


 Seleni/Zn 0.5 ml/


 Insulin Human


 Regular 25 unit/


 Total Parenteral


 Nutrition/Amino


 Acids/Dextrose/


 Fat Emulsion


 Intravenous  1,400 ml @ 


 58.333 mls/


 hr  TPN  CONT  4/7/20 22:00


 4/8/20 21:59 DC 4/7/20 22:13


58.333 MLS/HR


 


 Sodium Chloride


 90 meq/Potassium


 Chloride 15 meq/


 Potassium


 Phosphate 10 mmol/


 Magnesium Sulfate


 8 meq/Calcium


 Gluconate 15 meq/


 Multivitamins 10


 ml/Chromium/


 Copper/Manganese/


 Seleni/Zn 0.5 ml/


 Insulin Human


 Regular 25 unit/


 Total Parenteral


 Nutrition/Amino


 Acids/Dextrose/


 Fat Emulsion


 Intravenous  1,400 ml @ 


 58.333 mls/


 hr  TPN  CONT  4/5/20 22:00


 4/6/20 21:59 DC 4/5/20 21:20


58.333 MLS/HR


 


 Sodium Chloride


 90 meq/Potassium


 Chloride 15 meq/


 Potassium


 Phosphate 10 mmol/


 Magnesium Sulfate


 10 meq/Calcium


 Gluconate 20 meq/


 Multivitamins 10


 ml/Chromium/


 Copper/Manganese/


 Seleni/Zn 0.5 ml/


 Total Parenteral


 Nutrition/Amino


 Acids/Dextrose/


 Fat Emulsion


 Intravenous  1,400 ml @ 


 58.333 mls/


 hr  TPN  CONT  3/23/20 22:00


 3/24/20 21:59 DC 3/23/20 21:42


58.333 MLS/HR


 


 Sodium Chloride


 90 meq/Potassium


 Chloride 15 meq/


 Potassium


 Phosphate 10 mmol/


 Magnesium Sulfate


 12 meq/Calcium


 Gluconate 15 meq/


 Multivitamins 10


 ml/Chromium/


 Copper/Manganese/


 Seleni/Zn 0.5 ml/


 Insulin Human


 Regular 25 unit/


 Total Parenteral


 Nutrition/Amino


 Acids/Dextrose/


 Fat Emulsion


 Intravenous  1,400 ml @ 


 58.333 mls/


 hr  TPN  CONT  4/6/20 22:00


 4/7/20 21:59 DC 4/6/20 22:24


58.333 MLS/HR


 


 Sodium Chloride


 90 meq/Potassium


 Chloride 15 meq/


 Potassium


 Phosphate 15 mmol/


 Magnesium Sulfate


 10 meq/Calcium


 Gluconate 15 meq/


 Multivitamins 10


 ml/Chromium/


 Copper/Manganese/


 Seleni/Zn 0.5 ml/


 Total Parenteral


 Nutrition/Amino


 Acids/Dextrose/


 Fat Emulsion


 Intravenous  1,400 ml @ 


 58.333 mls/


 hr  TPN  CONT  3/24/20 22:00


 3/25/20 21:59 DC 3/24/20 22:17


58.333 MLS/HR


 


 Sodium Chloride


 90 meq/Potassium


 Chloride 15 meq/


 Potassium


 Phosphate 15 mmol/


 Magnesium Sulfate


 10 meq/Calcium


 Gluconate 20 meq/


 Multivitamins 10


 ml/Chromium/


 Copper/Manganese/


 Seleni/Zn 0.5 ml/


 Total Parenteral


 Nutrition/Amino


 Acids/Dextrose/


 Fat Emulsion


 Intravenous  1,200 ml @ 


 50 mls/hr  TPN  CONT  3/22/20 22:00


 3/22/20 14:17 DC  





 


 Sodium Chloride


 90 meq/Potassium


 Chloride 15 meq/


 Potassium


 Phosphate 18 mmol/


 Magnesium Sulfate


 8 meq/Calcium


 Gluconate 15 meq/


 Multivitamins 10


 ml/Chromium/


 Copper/Manganese/


 Seleni/Zn 0.5 ml/


 Insulin Human


 Regular 10 unit/


 Total Parenteral


 Nutrition/Amino


 Acids/Dextrose/


 Fat Emulsion


 Intravenous  1,400 ml @ 


 58.333 mls/


 hr  TPN  CONT  3/27/20 22:00


 3/28/20 21:59 DC 3/27/20 21:43


58.333 MLS/HR


 


 Sodium Chloride


 90 meq/Potassium


 Chloride 15 meq/


 Potassium


 Phosphate 18 mmol/


 Magnesium Sulfate


 8 meq/Calcium


 Gluconate 15 meq/


 Multivitamins 10


 ml/Chromium/


 Copper/Manganese/


 Seleni/Zn 0.5 ml/


 Insulin Human


 Regular 15 unit/


 Total Parenteral


 Nutrition/Amino


 Acids/Dextrose/


 Fat Emulsion


 Intravenous  1,400 ml @ 


 58.333 mls/


 hr  TPN  CONT  3/30/20 22:00


 3/31/20 21:59 DC 3/30/20 21:47


58.333 MLS/HR


 


 Sodium Chloride


 90 meq/Potassium


 Chloride 15 meq/


 Potassium


 Phosphate 18 mmol/


 Magnesium Sulfate


 8 meq/Calcium


 Gluconate 15 meq/


 Multivitamins 10


 ml/Chromium/


 Copper/Manganese/


 Seleni/Zn 0.5 ml/


 Insulin Human


 Regular 20 unit/


 Total Parenteral


 Nutrition/Amino


 Acids/Dextrose/


 Fat Emulsion


 Intravenous  1,400 ml @ 


 58.333 mls/


 hr  TPN  CONT  4/2/20 22:00


 4/3/20 21:59 DC 4/2/20 22:45


58.333 MLS/HR


 


 Sodium Chloride


 90 meq/Potassium


 Chloride 15 meq/


 Potassium


 Phosphate 18 mmol/


 Magnesium Sulfate


 8 meq/Calcium


 Gluconate 15 meq/


 Multivitamins 10


 ml/Chromium/


 Copper/Manganese/


 Seleni/Zn 0.5 ml/


 Total Parenteral


 Nutrition/Amino


 Acids/Dextrose/


 Fat Emulsion


 Intravenous  1,400 ml @ 


 58.333 mls/


 hr  TPN  CONT  3/26/20 22:00


 3/27/20 21:59 DC 3/26/20 22:00


58.333 MLS/HR


 


 Sodium Chloride


 90 meq/Potassium


 Phosphate 15 mmol/


 Magnesium Sulfate


 12 meq/Calcium


 Gluconate 15 meq/


 Multivitamins 10


 ml/Chromium/


 Copper/Manganese/


 Seleni/Zn 0.5 ml/


 Insulin Human


 Regular 30 unit/


 Total Parenteral


 Nutrition/Amino


 Acids/Dextrose/


 Fat Emulsion


 Intravenous  1,400 ml @ 


 58.333 mls/


 hr  TPN  CONT  4/10/20 22:00


 4/11/20 21:59 DC 4/10/20 21:49


58.333 MLS/HR


 


 Sodium Chloride


 90 meq/Potassium


 Phosphate 15 mmol/


 Magnesium Sulfate


 12 meq/Calcium


 Gluconate 15 meq/


 Multivitamins 10


 ml/Chromium/


 Copper/Manganese/


 Seleni/Zn 0.5 ml/


 Insulin Human


 Regular 40 unit/


 Total Parenteral


 Nutrition/Amino


 Acids/Dextrose/


 Fat Emulsion


 Intravenous  1,400 ml @ 


 58.333 mls/


 hr  TPN  CONT  4/11/20 22:00


 4/12/20 21:59 DC 4/11/20 21:21


58.333 MLS/HR


 


 Sodium Chloride


 90 meq/Potassium


 Phosphate 19 mmol/


 Magnesium Sulfate


 12 meq/Calcium


 Gluconate 15 meq/


 Multivitamins 10


 ml/Chromium/


 Copper/Manganese/


 Seleni/Zn 0.5 ml/


 Insulin Human


 Regular 40 unit/


 Total Parenteral


 Nutrition/Amino


 Acids/Dextrose/


 Fat Emulsion


 Intravenous  1,400 ml @ 


 58.333 mls/


 hr  TPN  CONT  4/12/20 22:00


 4/13/20 21:59 DC 4/12/20 21:54


58.333 MLS/HR


 


 Sodium Chloride


 90 meq/Potassium


 Phosphate 5 mmol/


 Magnesium Sulfate


 12 meq/Calcium


 Gluconate 15 meq/


 Multivitamins 10


 ml/Chromium/


 Copper/Manganese/


 Seleni/Zn 0.5 ml/


 Insulin Human


 Regular 30 unit/


 Total Parenteral


 Nutrition/Amino


 Acids/Dextrose/


 Fat Emulsion


 Intravenous  1,400 ml @ 


 58.333 mls/


 hr  TPN  CONT  4/9/20 22:00


 4/10/20 21:59 DC 4/9/20 22:08


58.333 MLS/HR


 


 Sodium Chloride


 100 meq/Potassium


 Chloride 40 meq/


 Magnesium Sulfate


 15 meq/Calcium


 Gluconate 15 meq/


 Multivitamins 10


 ml/Chromium/


 Copper/Manganese/


 Seleni/Zn 0.5 ml/


 Insulin Human


 Regular 35 unit/


 Total Parenteral


 Nutrition/Amino


 Acids/Dextrose/


 Fat Emulsion


 Intravenous  1,400 ml @ 


 58.333 mls/


 hr  TPN  CONT  4/19/20 22:00


 4/20/20 21:59 DC 4/19/20 22:46


58.333 MLS/HR


 


 Sodium Chloride


 100 meq/Potassium


 Chloride 40 meq/


 Magnesium Sulfate


 20 meq/Calcium


 Gluconate 10 meq/


 Multivitamins 10


 ml/Chromium/


 Copper/Manganese/


 Seleni/Zn 0.5 ml/


 Insulin Human


 Regular 35 unit/


 Total Parenteral


 Nutrition/Amino


 Acids/Dextrose/


 Fat Emulsion


 Intravenous  1,400 ml @ 


 58.333 mls/


 hr  TPN  CONT  4/23/20 22:00


 4/24/20 21:59 DC 4/24/20 00:06


58.333 MLS/HR


 


 Sodium Chloride


 100 meq/Potassium


 Chloride 40 meq/


 Magnesium Sulfate


 20 meq/Calcium


 Gluconate 15 meq/


 Multivitamins 10


 ml/Chromium/


 Copper/Manganese/


 Seleni/Zn 0.5 ml/


 Insulin Human


 Regular 35 unit/


 Total Parenteral


 Nutrition/Amino


 Acids/Dextrose/


 Fat Emulsion


 Intravenous  1,400 ml @ 


 58.333 mls/


 hr  TPN  CONT  4/22/20 22:00


 4/23/20 21:59 DC 4/22/20 22:27


58.333 MLS/HR


 


 Sodium Chloride


 100 meq/Potassium


 Phosphate 10 mmol/


 Magnesium Sulfate


 12 meq/Calcium


 Gluconate 15 meq/


 Multivitamins 10


 ml/Chromium/


 Copper/Manganese/


 Seleni/Zn 0.5 ml/


 Insulin Human


 Regular 35 unit/


 Potassium


 Chloride 20 meq/


 Total Parenteral


 Nutrition/Amino


 Acids/Dextrose/


 Fat Emulsion


 Intravenous  1,400 ml @ 


 58.333 mls/


 hr  TPN  CONT  4/16/20 22:00


 4/17/20 21:59 DC 4/16/20 22:10


58.333 MLS/HR


 


 Sodium Chloride


 100 meq/Potassium


 Phosphate 19 mmol/


 Magnesium Sulfate


 12 meq/Calcium


 Gluconate 15 meq/


 Multivitamins 10


 ml/Chromium/


 Copper/Manganese/


 Seleni/Zn 0.5 ml/


 Insulin Human


 Regular 40 unit/


 Potassium


 Chloride 20 meq/


 Total Parenteral


 Nutrition/Amino


 Acids/Dextrose/


 Fat Emulsion


 Intravenous  1,400 ml @ 


 58.333 mls/


 hr  TPN  CONT  4/15/20 22:00


 4/16/20 21:59 DC 4/15/20 21:20


58.333 MLS/HR


 


 Sodium Chloride


 100 meq/Potassium


 Phosphate 5 mmol/


 Magnesium Sulfate


 12 meq/Calcium


 Gluconate 15 meq/


 Multivitamins 10


 ml/Chromium/


 Copper/Manganese/


 Seleni/Zn 0.5 ml/


 Insulin Human


 Regular 35 unit/


 Potassium


 Chloride 20 meq/


 Total Parenteral


 Nutrition/Amino


 Acids/Dextrose/


 Fat Emulsion


 Intravenous  1,400 ml @ 


 58.333 mls/


 hr  TPN  CONT  4/17/20 22:00


 4/18/20 21:59 DC 4/17/20 22:59


58.333 MLS/HR


 


 Succinylcholine


 Chloride


  (Anectine)  120 mg  1X  ONCE  3/23/20 08:30


 3/23/20 08:31 DC 3/23/20 08:34


120 MG








Lab





Laboratory Tests








Test


 5/4/20


11:58 5/4/20


18:17 5/5/20


00:21 5/5/20


06:35


 


Glucose (Fingerstick)


 182 mg/dL


(70-99) 145 mg/dL


(70-99) 153 mg/dL


(70-99) 





 


White Blood Count


 


 


 


 7.3 x10^3/uL


(4.0-11.0)


 


Red Blood Count


 


 


 


 2.55 x10^6/uL


(3.50-5.40)


 


Hemoglobin


 


 


 


 7.3 g/dL


(12.0-15.5)


 


Hematocrit


 


 


 


 23.2 %


(36.0-47.0)


 


Mean Corpuscular Volume    91 fL () 


 


Mean Corpuscular Hemoglobin    29 pg (25-35) 


 


Mean Corpuscular Hemoglobin


Concent 


 


 


 32 g/dL


(31-37)


 


Red Cell Distribution Width


 


 


 


 18.5 %


(11.5-14.5)


 


Platelet Count


 


 


 


 370 x10^3/uL


(140-400)


 


Neutrophils (%) (Auto)    75 % (31-73) 


 


Lymphocytes (%) (Auto)    17 % (24-48) 


 


Monocytes (%) (Auto)    5 % (0-9) 


 


Eosinophils (%) (Auto)    3 % (0-3) 


 


Basophils (%) (Auto)    1 % (0-3) 


 


Neutrophils # (Auto)


 


 


 


 5.5 x10^3/uL


(1.8-7.7)


 


Lymphocytes # (Auto)


 


 


 


 1.2 x10^3/uL


(1.0-4.8)


 


Monocytes # (Auto)


 


 


 


 0.4 x10^3/uL


(0.0-1.1)


 


Eosinophils # (Auto)


 


 


 


 0.2 x10^3/uL


(0.0-0.7)


 


Basophils # (Auto)


 


 


 


 0.0 x10^3/uL


(0.0-0.2)


 


Sodium Level


 


 


 


 153 mmol/L


(136-145)


 


Potassium Level


 


 


 


 4.1 mmol/L


(3.5-5.1)


 


Chloride Level


 


 


 


 114 mmol/L


()


 


Carbon Dioxide Level


 


 


 


 33 mmol/L


(21-32)


 


Anion Gap    6 (6-14) 


 


Blood Urea Nitrogen


 


 


 


 41 mg/dL


(7-20)


 


Creatinine


 


 


 


 0.9 mg/dL


(0.6-1.0)


 


Estimated GFR


(Cockcroft-Gault) 


 


 


 66.5 





 


Glucose Level


 


 


 


 104 mg/dL


(70-99)


 


Calcium Level


 


 


 


 8.6 mg/dL


(8.5-10.1)


 


Phosphorus Level


 


 


 


 3.2 mg/dL


(2.6-4.7)


 


Magnesium Level


 


 


 


 2.0 mg/dL


(1.8-2.4)


 


Test


 5/5/20


06:38 


 


 





 


Glucose (Fingerstick)


 102 mg/dL


(70-99) 


 


 











Results


All relevant outside records, renal labs, imaging studies, telemetry/EKG's were 

reviewed.


Other


CT abdomen 5/4  


KIDNEYS & URETERS:  Developing right great 2 hydronephrosis. No 


radiopaque stones.


 


BLADDER:  Obscured by large ascites


 


REPRODUCTIVE ORGANS:  Unremarkable


 


GASTROINTESTINAL: No findings of bowel obstruction, perforation or acute 


inflammation. Bowel contents show increased density and large bowel that 


could reflect dilute blood or residual of previous enteric contrast 


ingestion. The appendix is normal.


 


MESENTERY/PERITONEUM/RETROPERITONEUM: Trace drain has been placed within 


the ventral abdominal wall, with successful evacuation near completion of 


the previously evident ventral abdominal wall fluid collection. There 


remains a large, irregular fluid collection in the mesentery and right 


extraperitoneal soft tissues superficial to the right psoas muscle that 


appears slightly larger in the interval there is also slightly greater 


extraperitoneal fluid between the peritoneum and the left flank 


musculature.


 


VASCULAR:  Unremarkable


 


LYMPH NODES:  No adenopathy


 


OSSEOUS & SOFT TISSUES:  Unremarkable


 


IMPRESSION:


 


 


1. Findings consistent with sequelae of severe acute pancreatitis with 


enlarging peripancreatic and intra-abdominal fluid collections as 


described


 


2. Interval placement of a drain in the ventral abdominal wall with and 


partial evacuation of the ventral extraperitoneal fluid collection 


previously evident.











KIMBERLY MYERS MD                 May 5, 2020 10:35

## 2020-05-05 NOTE — PDOC
Infectious Disease Note


Subjective


Subjective


Uncomfortable in abd ? less


mild nausea


Feels distended still 


on trach with vent


Discussed with RN





Vital Sign


Vital Signs





Vital Signs








  Date Time  Temp Pulse Resp B/P (MAP) Pulse Ox O2 Delivery O2 Flow Rate FiO2


 


5/5/20 07:46     97 Ventilator  


 


5/5/20 06:00  91 18 94/56 (69)    


 


5/5/20 04:00 97.5       





 97.5       


 


5/4/20 23:00       8.0 











Physical Exam


PHYSICAL EXAM


GENERAL: Propped up in bed, Alert. weak appearing but not toxic


HEENT: Pupils equal, + NGT, oral cavity dry 


NECK:  Trach/vent 


LUNGS: rhonchi 


HEART:  S1, S2, regular 


ABDOMEN: Distended, hypoactive BS, drain placement (4/27 - serous fluid)


: [place] (4/14)


EXTREMITIES: Generalized edema, no cyanosis, SCDs bilaterally 


DERMATOLOGIC:  Warm and dry.  No generalized rash.  


CENTRAL NERVOUS SYSTEM: Extremely weak, mouthing words to simple questions 


PICC line in place April 30, 2020 clean


HDC has been removed


[facility] removed





Labs


Lab





Laboratory Tests








Test


 5/4/20


10:30 5/4/20


11:58 5/4/20


18:17 5/5/20


00:21


 


White Blood Count


 9.9 x10^3/uL


(4.0-11.0) 


 


 





 


Red Blood Count


 2.49 x10^6/uL


(3.50-5.40) 


 


 





 


Hemoglobin


 7.1 g/dL


(12.0-15.5) 


 


 





 


Hematocrit


 22.7 %


(36.0-47.0) 


 


 





 


Mean Corpuscular Volume 91 fL ()    


 


Mean Corpuscular Hemoglobin 29 pg (25-35)    


 


Mean Corpuscular Hemoglobin


Concent 31 g/dL


(31-37) 


 


 





 


Red Cell Distribution Width


 18.9 %


(11.5-14.5) 


 


 





 


Platelet Count


 458 x10^3/uL


(140-400) 


 


 





 


Neutrophils (%) (Auto) 74 % (31-73)    


 


Lymphocytes (%) (Auto) 19 % (24-48)    


 


Monocytes (%) (Auto) 5 % (0-9)    


 


Eosinophils (%) (Auto) 2 % (0-3)    


 


Basophils (%) (Auto) 0 % (0-3)    


 


Neutrophils # (Auto)


 7.3 x10^3/uL


(1.8-7.7) 


 


 





 


Lymphocytes # (Auto)


 1.9 x10^3/uL


(1.0-4.8) 


 


 





 


Monocytes # (Auto)


 0.5 x10^3/uL


(0.0-1.1) 


 


 





 


Eosinophils # (Auto)


 0.1 x10^3/uL


(0.0-0.7) 


 


 





 


Basophils # (Auto)


 0.0 x10^3/uL


(0.0-0.2) 


 


 





 


Glucose (Fingerstick)


 


 182 mg/dL


(70-99) 145 mg/dL


(70-99) 153 mg/dL


(70-99)


 


Test


 5/5/20


06:35 5/5/20


06:38 


 





 


White Blood Count


 7.3 x10^3/uL


(4.0-11.0) 


 


 





 


Red Blood Count


 2.55 x10^6/uL


(3.50-5.40) 


 


 





 


Hemoglobin


 7.3 g/dL


(12.0-15.5) 


 


 





 


Hematocrit


 23.2 %


(36.0-47.0) 


 


 





 


Mean Corpuscular Volume 91 fL ()    


 


Mean Corpuscular Hemoglobin 29 pg (25-35)    


 


Mean Corpuscular Hemoglobin


Concent 32 g/dL


(31-37) 


 


 





 


Red Cell Distribution Width


 18.5 %


(11.5-14.5) 


 


 





 


Platelet Count


 370 x10^3/uL


(140-400) 


 


 





 


Neutrophils (%) (Auto) 75 % (31-73)    


 


Lymphocytes (%) (Auto) 17 % (24-48)    


 


Monocytes (%) (Auto) 5 % (0-9)    


 


Eosinophils (%) (Auto) 3 % (0-3)    


 


Basophils (%) (Auto) 1 % (0-3)    


 


Neutrophils # (Auto)


 5.5 x10^3/uL


(1.8-7.7) 


 


 





 


Lymphocytes # (Auto)


 1.2 x10^3/uL


(1.0-4.8) 


 


 





 


Monocytes # (Auto)


 0.4 x10^3/uL


(0.0-1.1) 


 


 





 


Eosinophils # (Auto)


 0.2 x10^3/uL


(0.0-0.7) 


 


 





 


Basophils # (Auto)


 0.0 x10^3/uL


(0.0-0.2) 


 


 





 


Sodium Level


 153 mmol/L


(136-145) 


 


 





 


Potassium Level


 4.1 mmol/L


(3.5-5.1) 


 


 





 


Chloride Level


 114 mmol/L


() 


 


 





 


Carbon Dioxide Level


 33 mmol/L


(21-32) 


 


 





 


Anion Gap 6 (6-14)    


 


Blood Urea Nitrogen


 41 mg/dL


(7-20) 


 


 





 


Creatinine


 0.9 mg/dL


(0.6-1.0) 


 


 





 


Estimated GFR


(Cockcroft-Gault) 66.5 


 


 


 





 


Glucose Level


 104 mg/dL


(70-99) 


 


 





 


Calcium Level


 8.6 mg/dL


(8.5-10.1) 


 


 





 


Phosphorus Level


 3.2 mg/dL


(2.6-4.7) 


 


 





 


Magnesium Level


 2.0 mg/dL


(1.8-2.4) 


 


 





 


Glucose (Fingerstick)


 


 102 mg/dL


(70-99) 


 











Micro


U/S 5/4





IMPRESSION: Complex fluid collections within the right and lower 


quadrants. The abdominal visceral and vascular structures are not formally


assessed on this exam.





CT Scan 5/4





IMPRESSION:


 





1. Findings consistent with sequelae of severe acute pancreatitis with 


enlarging peripancreatic and intra-abdominal fluid collections as 


described


 


2. Interval placement of a drain in the ventral abdominal wall with and 


partial evacuation of the ventral extraperitoneal fluid collection 


previously evident.











4/27


Operative Note:


After obtaining informed consent, patient was taken to OR, induced under GETA 

and prepped in the usual fashion.  5 mm port placed umbilical and right mid 

abdomen, all under laparoscopic guidance.  Large amount of ascites encountered 

and aspirated off.  Fluid was clear with some whitish debris.  Viscera was 

completely locked in with obliteration of all planes, preventing any significant

exploration.  Copious irrigation.





Given patient's overall clinical improvement, favor against open procedure and 

attempt at cholecystectomy and/or necrosectomy, given high risk of 

complications.  Cholecystectomy will need to be performed, but favor waiting 3 

months.





19 ROBERT drain placed and secured with 3 0 nylon.  Skin repaired with 4 0 monocryl.

 Dressing placed.





Patient tolerated procedure well and sent to PACU in stable condition.  All 

counts correct.  Wound class is 4.
































CT Chest Abd/pelv 4/14


CT CHEST:


CT scan the chest was done without contrast. There is a tracheostomy tube 


in good position. There are large bilateral pleural effusions. There is 


atelectasis in both lung bases. There is no mediastinal adenopathy. Right 


arm PICC line extends to the superior vena cava. There is a temporary 


dialysis catheter extending to the vena cava near the atrium.


 


IMPRESSION:


1. Large bilateral effusions.


2. Atelectasis of both lungs.


 


End impression


 


CT abdomen pelvis


 


CT scan the abdomen pelvis was done following CT chest with contrast. NG 


tube extends into the stomach. Spleen is normal. There is no mass or 


hydronephrosis in the kidneys. There is a gallstone the gallbladder. A 


focal liver lesion is not identified. There is diffuse necrosis of the 


pancreas. There is peripancreatic fluid. The pattern is similar to the 


recent study. There is fluid in the paracolic gutters. There is 


retroperitoneal fluid surrounding the kidneys. Ascites extends into the 


pelvis. Ascites is similar to the prior study. There is no bowel 


obstruction. There is no free air.


 


IMPRESSION:


1. Diffuse pancreatic necrosis.


2. A extensive retroperitoneal fluid and inflammation.


3. Cholelithiasis.


4. Moderate to large volume ascites with little change.



































CT A/P, 4/9


IMPRESSION:


1. Increased ascites.


2. 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


at the pancreas


3. Cholelithiasis with thickening of the gallbladder wall.


4. Persistent pleural effusions and atelectasis in the lung bases.





Objective


Assessment


Fever - better - intermittent could be from underlying pancreatitis


Abd distention - U/S and CT reviewed


Acute pancreatitis with persistent  necrosis


CT a/p 4/9


    Increased ascites. Persistent evidence of necrotizing pancreatitis with 

fluid and phlegmon


   at the pancreas


   4/27 status post ROBERT drain placement - yeast on cult; Cult line tip 4/30 - neg


Anemia 


Cholelithiasis with thickening of the gallbladder wall.


Leucocytosis improving


JED,Hyperkalemia, Metabolic acidosis off dialysis


Acute hypoxic resp failure ,bilateral pleural effusion and atelectasis


hypocalcemia 


Prediabetes


HTN


s/p trach





Plan


Plan of Care





Uncertain if may benefit from drainage of fluid pockets but they appear to be 

increasing in size on CT.  Await GI f/u.  Gen surgery note reviewed from today -

no mention of needing drainage


Blood cult times 2 5/4 pending


Add Micafungin 5/4 with yeast from 4/27 abd cult will have it worked up and 

sensitivity


Dose Dapto 5/4


CBC this am and in am


cont merrem (4/8),


Electrolytes per primary 


Anemia per primary


Follow-up Intra-Op cultures and lab 


Maintain aspiration precaution


PT and OT as tolerated


Continue supportive care





D/w nursing











RASHAWN ROSEN MD               May 5, 2020 09:16

## 2020-05-05 NOTE — PDOC
PROGRESS NOTES


Chief Complaint


Chief Complaint


Acute hypoxic Respiratory failure requiring mechanical ventilation (on vent 

since 3/23)


Tracheostomy


bilateral pleural effusions/pulm edema 


Sepsis


Severe Acute gallstone pancreatitis (not a surgical candidate at this time) with

necrosis


Acute kidney failure now requiring dialysis


Salpingitis


Gallstones (Calculus of gallbladder with acute cholecystitis without 

obstruction)


HTN 


Leukocytosis 


Hypoxia


Uterine fibroid


Intractable pain


Intractable nausea


Covid 19 negative. 


Acute on chronic anemia 


EEG: No seizure activity


ESRD on HD


Hyperglycemia





Plan:


Continue with supportive measures


No surgical plans as of yet


We will continue to follow





History of Present Illness


History of Present Illness


Ms Tadeo is a 48yo F w/ PMHx HTN, prediabetes who presented to the emergency 

room with complaints of abdominal pain on 3/16/2020. Found with Lipase 49051, 

, , Bilirubin 1.4.


CT abdomen confirms pancreatic inflammation, peripancreatic fluid and 

inflammatory changes around the pancreas consistent with pancreatitis. 

Cholelithiasis and 1.4cm uterine fibroid as well as possible left salpingitis. 

Admitted for further care


GI, General surgery, ID, Pulm consulted.





3/17: PICC placed per IR. Renal US negative. Started on levophed. Repeat CT 

abdomen w/ necrosis; 3/18: Dialysis catheter per nephrology; 3/19: On BiPAP; 

3/20: BiPAP, dialysis; 3/21: Overnight Tmax 101.7 , still on BiPAP FiO2 40%, sti

ll on low dose Levophed gtt, TPN initiated. On dialysis


4/6: Tracheostomy; 4/12: S/p tracheostomy on vent spontaneous respirations with 

5 of pressure support 35% FiO2, rectal tube and a [place], off pressors; 

4/14:Still on vent via trach. Removed PICC and CVC LIJ and replaced. CT 

chest/abd/pelvis with bilateral pleural effusion and ascites.


4/15: Renal function stable. Still on vent. More interactive today. Miming wish 

for food. Plan discussed for thoracentesis/paracentesis with daughters today. 

They were under impression patient was doing worse due to a miscommunication 

which has been clarified over the phone. 4.3L removed.


4/17: Febrile overnight 101.8F. More interactive, still on vent. Asking for ice 

by miming; 4/18: Afebrile overnight. TMax last 24 hours 100.6F. Hb 7.1. 

Interactive when awake. 4/22: Transfusion 1u PRBC (6U total since admit)


4/23-4/26: TPN and precedex, vent.


4/27: Tmax 101F overnight. Hb 8.2. HD cath out since 4/24. Alert. On vent SIMV 

35% FiO2. Surgery: ex-lap, no naif or pancreatic necrosectomy 2/2 profound 

inflammation.


4/28: Seen POD #1. Afebrile overnight. BUN 62. CBC WNL today.Remains on vent via

trach, TPN. Able to point today and indicate she wishes her daughters and Rolf 

to be involved in her care.


4/29: Hb 7.4, Na 151. Remains on vent via trach, TPN. off HD for now. Not 

tolerating trach shield well.


4/30: Negative US for UE DVT. More relaxed today. Has some increase in UOP. 

Tolerating vent well. She is requesting to eat and drink via writing. T max 100F

24 hours ago, but 101F at 1200. Right PICC placed. Speaking valve for half the 

day in [facility]


5/1: Na 153 today. Good UOP. Tmax 101F at 1200 on 4/30. She becomes a bit 

anxious and did not sleep much last night, wishes to try to sleep this morning


5/2: Able to speak well with speaking valve today. Na 153, K2.9, Mg 1.8, Cr 1. 

100.4F axillary temp overnight. Asking for food.


5/3: Afebrile overnight. ABG with pH 7.27/75/123. Hb 7.1. Na 153. PCA settings 

decreased


5/4: No acute events reported overnight, case discussed with nursing staff 

patient in no acute distress no complaints during my visit


5/5: Patient responding to verbal stimuli.  She is saturating well and not 

requiring ventilation support, no acute events reported overnight seems to be 

slowly improving





Plan:


Cont vent weaning. will need BIPAP


Decrease opioids, may need to d/c PCA. Would favor long active basal fentanyl 

patch 12.5mg and Q3hr prn dilaudid 1mg, will d/w general surgery.


Trach shield and speaking valve during day when awake. Would recommend ABG after

4 hours to r/o CO2 retention, however and still vent overnight


Monitor fever curve, no obvious source of infection, possibly some aspiration 

events, atelectasis





Vitals


Vitals





Vital Signs








  Date Time  Temp Pulse Resp B/P (MAP) Pulse Ox O2 Delivery O2 Flow Rate FiO2


 


5/5/20 13:16  96 30 117/60 (79) 99 Tracheal Collar  


 


5/5/20 12:00       10.0 


 


5/5/20 12:00 98.5       





 98.5       











Physical Exam


Physical Exam


GENERAL: Propped up in bed, Alert. weak appearing but not toxic


HEENT: Pupils equal, + NGT, oral cavity dry 


NECK:  Trach/vent 


LUNGS: rhonchi 


HEART:  S1, S2, regular 


ABDOMEN: Distended, hypoactive BS, drain placement (4/27 - serous fluid)


: [place] (4/14)


EXTREMITIES: Generalized edema, no cyanosis, SCDs bilaterally 


DERMATOLOGIC:  Warm and dry.  No generalized rash.  


CENTRAL NERVOUS SYSTEM: Extremely weak, mouthing words to simple questions 


PICC line in place April 30, 2020 clean


HDC has been removed


LIJ removed


General:  Alert, mild distress


Heart:  Regular rate, No murmurs


Lungs:  Crackles


Abdomen:  Soft, Other (mild TTP, drain with serous output)


Extremities:  No edema


Skin:  Other (mottling noted to extremities )





Labs


LABS





Laboratory Tests








Test


 5/4/20


18:17 5/5/20


00:21 5/5/20


06:35 5/5/20


06:38


 


Glucose (Fingerstick)


 145 mg/dL


(70-99) 153 mg/dL


(70-99) 


 102 mg/dL


(70-99)


 


White Blood Count


 


 


 7.3 x10^3/uL


(4.0-11.0) 





 


Red Blood Count


 


 


 2.55 x10^6/uL


(3.50-5.40) 





 


Hemoglobin


 


 


 7.3 g/dL


(12.0-15.5) 





 


Hematocrit


 


 


 23.2 %


(36.0-47.0) 





 


Mean Corpuscular Volume   91 fL ()  


 


Mean Corpuscular Hemoglobin   29 pg (25-35)  


 


Mean Corpuscular Hemoglobin


Concent 


 


 32 g/dL


(31-37) 





 


Red Cell Distribution Width


 


 


 18.5 %


(11.5-14.5) 





 


Platelet Count


 


 


 370 x10^3/uL


(140-400) 





 


Neutrophils (%) (Auto)   75 % (31-73)  


 


Lymphocytes (%) (Auto)   17 % (24-48)  


 


Monocytes (%) (Auto)   5 % (0-9)  


 


Eosinophils (%) (Auto)   3 % (0-3)  


 


Basophils (%) (Auto)   1 % (0-3)  


 


Neutrophils # (Auto)


 


 


 5.5 x10^3/uL


(1.8-7.7) 





 


Lymphocytes # (Auto)


 


 


 1.2 x10^3/uL


(1.0-4.8) 





 


Monocytes # (Auto)


 


 


 0.4 x10^3/uL


(0.0-1.1) 





 


Eosinophils # (Auto)


 


 


 0.2 x10^3/uL


(0.0-0.7) 





 


Basophils # (Auto)


 


 


 0.0 x10^3/uL


(0.0-0.2) 





 


Sodium Level


 


 


 153 mmol/L


(136-145) 





 


Potassium Level


 


 


 4.1 mmol/L


(3.5-5.1) 





 


Chloride Level


 


 


 114 mmol/L


() 





 


Carbon Dioxide Level


 


 


 33 mmol/L


(21-32) 





 


Anion Gap   6 (6-14)  


 


Blood Urea Nitrogen


 


 


 41 mg/dL


(7-20) 





 


Creatinine


 


 


 0.9 mg/dL


(0.6-1.0) 





 


Estimated GFR


(Cockcroft-Gault) 


 


 66.5 


 





 


Glucose Level


 


 


 104 mg/dL


(70-99) 





 


Calcium Level


 


 


 8.6 mg/dL


(8.5-10.1) 





 


Phosphorus Level


 


 


 3.2 mg/dL


(2.6-4.7) 





 


Magnesium Level


 


 


 2.0 mg/dL


(1.8-2.4) 





 


Test


 5/5/20


12:18 


 


 





 


Glucose (Fingerstick)


 147 mg/dL


(70-99) 


 


 














Assessment and Plan


Assessmemt and Plan


Problems


Medical Problems:


(1) Acute pancreatitis


Status: Acute  





(2) Cholelithiasis


Status: Acute  











Comment


Review of Relevant


I have reviewed the following items josy (where applicable) has been applied.


Labs





Laboratory Tests








Test


 5/3/20


16:09 5/3/20


17:57 5/4/20


06:10 5/4/20


06:15


 


O2 Saturation 93 % (92-99)    


 


Arterial Blood pH


 7.47


(7.35-7.45) 


 


 





 


Arterial Blood pCO2 at


Patient Temp 49 mmHg


(35-46) 


 


 





 


Arterial Blood pO2 at Patient


Temp 73 mmHg


() 


 


 





 


Arterial Blood HCO3


 35 mmol/L


(21-28) 


 


 





 


Arterial Blood Base Excess


 10 mmol/L


(-3-3) 


 


 





 


Oxyhemoglobin 92.7 %    


 


Methemoglobin


 0.4 %


(0.0-1.9) 


 


 





 


Carbon Monoxide, Quantitative


 0.3 %


(0.0-1.9) 


 


 





 


FiO2 30    


 


Glucose (Fingerstick)


 


 148 mg/dL


(70-99) 


 153 mg/dL


(70-99)


 


Sodium Level


 


 


 154 mmol/L


(136-145) 





 


Potassium Level


 


 


 4.0 mmol/L


(3.5-5.1) 





 


Chloride Level


 


 


 114 mmol/L


() 





 


Carbon Dioxide Level


 


 


 33 mmol/L


(21-32) 





 


Anion Gap   7 (6-14)  


 


Blood Urea Nitrogen


 


 


 45 mg/dL


(7-20) 





 


Creatinine


 


 


 0.9 mg/dL


(0.6-1.0) 





 


Estimated GFR


(Cockcroft-Gault) 


 


 66.5 


 





 


BUN/Creatinine Ratio   50 (6-20)  


 


Glucose Level


 


 


 164 mg/dL


(70-99) 





 


Calcium Level


 


 


 8.8 mg/dL


(8.5-10.1) 





 


Phosphorus Level


 


 


 3.4 mg/dL


(2.6-4.7) 





 


Magnesium Level


 


 


 2.2 mg/dL


(1.8-2.4) 





 


Total Bilirubin


 


 


 0.5 mg/dL


(0.2-1.0) 





 


Aspartate Amino Transf


(AST/SGOT) 


 


 39 U/L (15-37) 


 





 


Alanine Aminotransferase


(ALT/SGPT) 


 


 36 U/L (14-59) 


 





 


Alkaline Phosphatase


 


 


 127 U/L


() 





 


Total Protein


 


 


 5.6 g/dL


(6.4-8.2) 





 


Albumin


 


 


 1.8 g/dL


(3.4-5.0) 





 


Albumin/Globulin Ratio   0.5 (1.0-1.7)  


 


Triglycerides Level


 


 


 174 mg/dL


(0-150) 





 


Test


 5/4/20


10:30 5/4/20


11:58 5/4/20


18:17 5/5/20


00:21


 


White Blood Count


 9.9 x10^3/uL


(4.0-11.0) 


 


 





 


Red Blood Count


 2.49 x10^6/uL


(3.50-5.40) 


 


 





 


Hemoglobin


 7.1 g/dL


(12.0-15.5) 


 


 





 


Hematocrit


 22.7 %


(36.0-47.0) 


 


 





 


Mean Corpuscular Volume 91 fL ()    


 


Mean Corpuscular Hemoglobin 29 pg (25-35)    


 


Mean Corpuscular Hemoglobin


Concent 31 g/dL


(31-37) 


 


 





 


Red Cell Distribution Width


 18.9 %


(11.5-14.5) 


 


 





 


Platelet Count


 458 x10^3/uL


(140-400) 


 


 





 


Neutrophils (%) (Auto) 74 % (31-73)    


 


Lymphocytes (%) (Auto) 19 % (24-48)    


 


Monocytes (%) (Auto) 5 % (0-9)    


 


Eosinophils (%) (Auto) 2 % (0-3)    


 


Basophils (%) (Auto) 0 % (0-3)    


 


Neutrophils # (Auto)


 7.3 x10^3/uL


(1.8-7.7) 


 


 





 


Lymphocytes # (Auto)


 1.9 x10^3/uL


(1.0-4.8) 


 


 





 


Monocytes # (Auto)


 0.5 x10^3/uL


(0.0-1.1) 


 


 





 


Eosinophils # (Auto)


 0.1 x10^3/uL


(0.0-0.7) 


 


 





 


Basophils # (Auto)


 0.0 x10^3/uL


(0.0-0.2) 


 


 





 


Glucose (Fingerstick)


 


 182 mg/dL


(70-99) 145 mg/dL


(70-99) 153 mg/dL


(70-99)


 


Test


 5/5/20


06:35 5/5/20


06:38 5/5/20


12:18 





 


White Blood Count


 7.3 x10^3/uL


(4.0-11.0) 


 


 





 


Red Blood Count


 2.55 x10^6/uL


(3.50-5.40) 


 


 





 


Hemoglobin


 7.3 g/dL


(12.0-15.5) 


 


 





 


Hematocrit


 23.2 %


(36.0-47.0) 


 


 





 


Mean Corpuscular Volume 91 fL ()    


 


Mean Corpuscular Hemoglobin 29 pg (25-35)    


 


Mean Corpuscular Hemoglobin


Concent 32 g/dL


(31-37) 


 


 





 


Red Cell Distribution Width


 18.5 %


(11.5-14.5) 


 


 





 


Platelet Count


 370 x10^3/uL


(140-400) 


 


 





 


Neutrophils (%) (Auto) 75 % (31-73)    


 


Lymphocytes (%) (Auto) 17 % (24-48)    


 


Monocytes (%) (Auto) 5 % (0-9)    


 


Eosinophils (%) (Auto) 3 % (0-3)    


 


Basophils (%) (Auto) 1 % (0-3)    


 


Neutrophils # (Auto)


 5.5 x10^3/uL


(1.8-7.7) 


 


 





 


Lymphocytes # (Auto)


 1.2 x10^3/uL


(1.0-4.8) 


 


 





 


Monocytes # (Auto)


 0.4 x10^3/uL


(0.0-1.1) 


 


 





 


Eosinophils # (Auto)


 0.2 x10^3/uL


(0.0-0.7) 


 


 





 


Basophils # (Auto)


 0.0 x10^3/uL


(0.0-0.2) 


 


 





 


Sodium Level


 153 mmol/L


(136-145) 


 


 





 


Potassium Level


 4.1 mmol/L


(3.5-5.1) 


 


 





 


Chloride Level


 114 mmol/L


() 


 


 





 


Carbon Dioxide Level


 33 mmol/L


(21-32) 


 


 





 


Anion Gap 6 (6-14)    


 


Blood Urea Nitrogen


 41 mg/dL


(7-20) 


 


 





 


Creatinine


 0.9 mg/dL


(0.6-1.0) 


 


 





 


Estimated GFR


(Cockcroft-Gault) 66.5 


 


 


 





 


Glucose Level


 104 mg/dL


(70-99) 


 


 





 


Calcium Level


 8.6 mg/dL


(8.5-10.1) 


 


 





 


Phosphorus Level


 3.2 mg/dL


(2.6-4.7) 


 


 





 


Magnesium Level


 2.0 mg/dL


(1.8-2.4) 


 


 





 


Glucose (Fingerstick)


 


 102 mg/dL


(70-99) 147 mg/dL


(70-99) 











Laboratory Tests








Test


 5/4/20


18:17 5/5/20


00:21 5/5/20


06:35 5/5/20


06:38


 


Glucose (Fingerstick)


 145 mg/dL


(70-99) 153 mg/dL


(70-99) 


 102 mg/dL


(70-99)


 


White Blood Count


 


 


 7.3 x10^3/uL


(4.0-11.0) 





 


Red Blood Count


 


 


 2.55 x10^6/uL


(3.50-5.40) 





 


Hemoglobin


 


 


 7.3 g/dL


(12.0-15.5) 





 


Hematocrit


 


 


 23.2 %


(36.0-47.0) 





 


Mean Corpuscular Volume   91 fL ()  


 


Mean Corpuscular Hemoglobin   29 pg (25-35)  


 


Mean Corpuscular Hemoglobin


Concent 


 


 32 g/dL


(31-37) 





 


Red Cell Distribution Width


 


 


 18.5 %


(11.5-14.5) 





 


Platelet Count


 


 


 370 x10^3/uL


(140-400) 





 


Neutrophils (%) (Auto)   75 % (31-73)  


 


Lymphocytes (%) (Auto)   17 % (24-48)  


 


Monocytes (%) (Auto)   5 % (0-9)  


 


Eosinophils (%) (Auto)   3 % (0-3)  


 


Basophils (%) (Auto)   1 % (0-3)  


 


Neutrophils # (Auto)


 


 


 5.5 x10^3/uL


(1.8-7.7) 





 


Lymphocytes # (Auto)


 


 


 1.2 x10^3/uL


(1.0-4.8) 





 


Monocytes # (Auto)


 


 


 0.4 x10^3/uL


(0.0-1.1) 





 


Eosinophils # (Auto)


 


 


 0.2 x10^3/uL


(0.0-0.7) 





 


Basophils # (Auto)


 


 


 0.0 x10^3/uL


(0.0-0.2) 





 


Sodium Level


 


 


 153 mmol/L


(136-145) 





 


Potassium Level


 


 


 4.1 mmol/L


(3.5-5.1) 





 


Chloride Level


 


 


 114 mmol/L


() 





 


Carbon Dioxide Level


 


 


 33 mmol/L


(21-32) 





 


Anion Gap   6 (6-14)  


 


Blood Urea Nitrogen


 


 


 41 mg/dL


(7-20) 





 


Creatinine


 


 


 0.9 mg/dL


(0.6-1.0) 





 


Estimated GFR


(Cockcroft-Gault) 


 


 66.5 


 





 


Glucose Level


 


 


 104 mg/dL


(70-99) 





 


Calcium Level


 


 


 8.6 mg/dL


(8.5-10.1) 





 


Phosphorus Level


 


 


 3.2 mg/dL


(2.6-4.7) 





 


Magnesium Level


 


 


 2.0 mg/dL


(1.8-2.4) 





 


Test


 5/5/20


12:18 


 


 





 


Glucose (Fingerstick)


 147 mg/dL


(70-99) 


 


 











Microbiology


5/4/20 Blood Culture - Preliminary, Resulted


         NO GROWTH AFTER 1 DAY


4/30/20 Aerobic Culture - Final, Complete


          


4/30/20 Aerobic Culture Result 1 (ANSON) - Final, Complete


          


4/30/20 Gram Stain - Final, Complete


          


4/30/20 Gram Stain Result 1 (ANSON) - Final, Complete


          


4/30/20 Gram Stain Result 2 (ANSON) - Final, Complete


          


4/27/20 Aerobic and Anaerobic Culture - Final, Complete


          


4/27/20 Anaerobic Culture Result 1 (ANSON) - Final, Complete


          


4/27/20 Aerobic Culture - Final, Complete


          


4/27/20 Aerobic Culture Result 1 (ANSON) - Final, Complete


          


4/27/20 Gram Stain - Final, Complete


          


4/27/20 Gram Stain Result 1 (ANSON) - Final, Complete


          


4/27/20 Gram Stain Result 2 (ANSON) - Final, Complete


          


4/12/20 Urine Culture - Final, Complete


          


4/12/20 Urine Culture Result 1 (ANSON) - Final, Complete


Medications





Current Medications


Sodium Chloride 1,000 ml @  1,000 mls/hr Q1H IV  Last administered on 3/16/20at 

03:00;  Start 3/16/20 at 03:00;  Stop 3/16/20 at 03:59;  Status DC


Ondansetron HCl (Zofran) 4 mg 1X  ONCE IVP  Last administered on 3/16/20at 

03:27;  Start 3/16/20 at 03:00;  Stop 3/16/20 at 03:01;  Status DC


Morphine Sulfate (Morphine Sulfate) 4 mg 1X  ONCE IV ;  Start 3/16/20 at 03:00; 

Stop 3/16/20 at 03:01;  Status Cancel


Ketorolac Tromethamine (Toradol 30mg Vial) 30 mg 1X  ONCE IV  Last administered 

on 3/16/20at 02:54;  Start 3/16/20 at 03:00;  Stop 3/16/20 at 03:01;  Status DC


Fentanyl Citrate (Fentanyl 2ml Vial) 25 mcg 1X  ONCE IVP  Last administered on 

3/16/20at 03:23;  Start 3/16/20 at 03:30;  Stop 3/16/20 at 03:31;  Status DC


Fentanyl Citrate (Fentanyl 2ml Vial) 100 mcg STK-MED ONCE .ROUTE ;  Start 

3/16/20 at 03:18;  Stop 3/16/20 at 03:18;  Status DC


Iohexol (Omnipaque 350 Mg/ml) 90 ml 1X  ONCE IV  Last administered on 3/16/20at 

03:25;  Start 3/16/20 at 03:30;  Stop 3/16/20 at 03:31;  Status DC


Info (CONTRAST GIVEN -- Rx MONITORING) 1 each PRN DAILY  PRN MC SEE COMMENTS;  

Start 3/16/20 at 03:30;  Stop 3/18/20 at 03:29;  Status DC


Hydromorphone HCl (Dilaudid) 0.5 mg 1X  ONCE IV  Last administered on 3/16/20at 

03:55;  Start 3/16/20 at 04:30;  Stop 3/16/20 at 04:32;  Status DC


Ondansetron HCl (Zofran) 4 mg PRN Q8HRS  PRN IV NAUSEA/VOMITING 1ST CHOICE;  

Start 3/16/20 at 05:00;  Stop 3/16/20 at 09:27;  Status DC


Morphine Sulfate (Morphine Sulfate) 2 mg PRN Q2HR  PRN IV SEVERE PAIN 7-10 Last 

administered on 3/17/20at 12:26;  Start 3/16/20 at 05:00;  Stop 3/17/20 at 

14:15;  Status DC


Sodium Chloride 1,000 ml @  125 mls/hr Q8H IV  Last administered on 3/16/20at 

20:56;  Start 3/16/20 at 05:00;  Stop 3/17/20 at 04:59;  Status DC


Hydromorphone HCl (Dilaudid) 0.5 mg PRN Q3HRS  PRN IV SEVERE PAIN 7-10 Last 

administered on 3/17/20at 10:06;  Start 3/16/20 at 05:00;  Stop 3/17/20 at 

12:01;  Status DC


Piperacillin Sod/ Tazobactam Sod 4.5 gm/Sodium Chloride 100 ml @  200 mls/hr 1X 

ONCE IV  Last administered on 3/16/20at 05:44;  Start 3/16/20 at 06:00;  Stop 3

/16/20 at 06:29;  Status DC


Ondansetron HCl (Zofran) 4 mg PRN Q4HRS  PRN IV NAUSEA/VOMITING 1ST CHOICE Last 

administered on 5/4/20at 21:18;  Start 3/16/20 at 09:30


Insulin Human Lispro (HumaLOG) 0-9 UNITS Q6HRS SQ  Last administered on 5/5/20at

00:00;  Start 3/16/20 at 09:30


Dextrose (Dextrose 50%-Water Syringe) 12.5 gm PRN Q15MIN  PRN IV SEE COMMENTS;  

Start 3/16/20 at 09:30


Pantoprazole Sodium (PROTONIX VIAL for IV PUSH) 40 mg DAILYAC IVP  Last 

administered on 5/5/20at 09:11;  Start 3/16/20 at 11:30


Prochlorperazine Edisylate (Compazine) 10 mg PRN Q6HRS  PRN IV NAUSEA/VOMITING, 

2nd CHOICE Last administered on 5/4/20at 06:06;  Start 3/16/20 at 17:45


Atenolol (Tenormin) 100 mg DAILY PO ;  Start 3/17/20 at 09:00;  Stop 3/16/20 at 

20:08;  Status DC


Metoprolol Tartrate (Lopressor Vial) 2.5 mg Q6HRS IVP  Last administered on 

3/17/20at 05:51;  Start 3/16/20 at 20:15;  Stop 3/17/20 at 10:02;  Status DC


Metoprolol Tartrate (Lopressor Vial) 5 mg Q6HRS IVP  Last administered on 

3/26/20at 00:12;  Start 3/17/20 at 10:15;  Stop 3/28/20 at 08:48;  Status DC


Hydromorphone HCl (Dilaudid) 1 mg PRN Q3HRS  PRN IV SEVERE PAIN 7-10 Last 

administered on 3/23/20at 05:13;  Start 3/17/20 at 12:00;  Stop 3/31/20 at 

00:25;  Status DC


Lidocaine HCl (Buffered Lidocaine 1%) 3 ml STK-MED ONCE .ROUTE ;  Start 3/17/20 

at 12:55;  Stop 3/17/20 at 12:56;  Status DC


Albumin Human 500 ml @  125 mls/hr 1X  ONCE IV  Last administered on 3/17/20at 

14:33;  Start 3/17/20 at 14:30;  Stop 3/17/20 at 18:32;  Status DC


Norepinephrine Bitartrate 8 mg/ Dextrose 258 ml @  17.299 mls/ hr CONT  PRN IV 

PER PROTOCOL Last administered on 4/14/20at 12:48;  Start 3/17/20 at 15:30;  

Stop 4/17/20 at 09:19;  Status DC


Sodium Chloride 1,000 ml @  125 mls/hr Q8H IV  Last administered on 3/17/20at 

21:04;  Start 3/17/20 at 16:00;  Stop 3/18/20 at 02:42;  Status DC


Albumin Human 500 ml @  125 mls/hr PRN BID  PRN IV After every 2L NSS & BP < 

90mm Last administered on 4/1/20at 14:21;  Start 3/17/20 at 16:00


Iohexol (Omnipaque 300 Mg/ml) 60 ml 1X  ONCE IV  Last administered on 3/17/20at 

17:20;  Start 3/17/20 at 17:00;  Stop 3/17/20 at 17:01;  Status DC


Info (CONTRAST GIVEN -- Rx MONITORING) 1 each PRN DAILY  PRN MC SEE COMMENTS;  

Start 3/17/20 at 17:00;  Stop 3/19/20 at 16:59;  Status DC


Meropenem 1 gm/ Sodium Chloride 100 ml @  200 mls/hr Q8HRS IV  Last administered

on 3/18/20at 05:45;  Start 3/17/20 at 20:00;  Stop 3/18/20 at 08:48;  Status DC


Furosemide (Lasix) 40 mg 1X  ONCE IVP  Last administered on 3/17/20at 22:12;  

Start 3/17/20 at 22:30;  Stop 3/17/20 at 22:31;  Status DC


Calcium Chloride 1000 mg/Sodium Chloride 110 ml @  220 mls/hr 1X  ONCE IV  Last 

administered on 3/17/20at 22:11;  Start 3/17/20 at 22:30;  Stop 3/17/20 at 

22:59;  Status DC


Albuterol Sulfate (Ventolin Neb Soln) 2.5 mg 1X  ONCE NEB  Last administered on 

3/18/20at 00:56;  Start 3/17/20 at 22:30;  Stop 3/17/20 at 22:31;  Status DC


Insulin Human Regular (HumuLIN R VIAL) 5 unit 1X  ONCE IV  Last administered on 

3/17/20at 22:14;  Start 3/17/20 at 22:30;  Stop 3/17/20 at 22:31;  Status DC


Magnesium Sulfate 50 ml @ 25 mls/hr 1X  ONCE IV  Last administered on 3/18/20at 

02:57;  Start 3/18/20 at 03:00;  Stop 3/18/20 at 04:59;  Status DC


Calcium Gluconate 1000 mg/Sodium Chloride 110 ml @  220 mls/hr 1X  ONCE IV  Last

administered on 3/18/20at 02:46;  Start 3/18/20 at 03:00;  Stop 3/18/20 at 

03:29;  Status DC


Sodium Chloride 1,000 ml @  200 mls/hr Q5H IV  Last administered on 3/18/20at 

02:46;  Start 3/18/20 at 03:00;  Stop 3/18/20 at 10:21;  Status DC


Calcium Gluconate 1000 mg/Sodium Chloride 110 ml @  220 mls/hr 1X  ONCE IV  Last

administered on 3/18/20at 03:21;  Start 3/18/20 at 03:30;  Stop 3/18/20 at 

03:59;  Status DC


Sodium Bicarbonate 50 meq/Sodium Chloride 1,050 ml @  75 mls/hr Q14H IV  Last 

administered on 3/22/20at 21:10;  Start 3/18/20 at 07:30;  Stop 3/23/20 at 

10:28;  Status DC


Calcium Gluconate 2000 mg/Sodium Chloride 120 ml @  220 mls/hr 1X  ONCE IV  Last

administered on 3/18/20at 09:05;  Start 3/18/20 at 07:30;  Stop 3/18/20 at 

08:02;  Status DC


Lidocaine HCl (Xylocaine-Mpf 1% 2ml Vial) 2 ml STK-MED ONCE .ROUTE ;  Start 

3/18/20 at 08:47;  Stop 3/18/20 at 08:47;  Status DC


Meropenem 500 mg/ Sodium Chloride 50 ml @  100 mls/hr Q12HR IV  Last 

administered on 3/23/20at 21:01;  Start 3/18/20 at 18:00;  Stop 3/24/20 at 

07:58;  Status DC


Lidocaine HCl (Buffered Lidocaine 1%) 3 ml STK-MED ONCE .ROUTE ;  Start 3/18/20 

at 09:46;  Stop 3/18/20 at 09:46;  Status DC


Lidocaine HCl (Buffered Lidocaine 1%) 6 ml 1X  ONCE INJ  Last administered on 

3/18/20at 10:26;  Start 3/18/20 at 10:15;  Stop 3/18/20 at 10:16;  Status DC


Info (Tpn Per Pharmacy) 1 each PRN DAILY  PRN MC SEE COMMENTS Last administered 

on 5/5/20at 11:41;  Start 3/18/20 at 12:00


Sodium Chloride 1,000 ml @  1,000 mls/hr Q1H PRN IV hypotension;  Start 3/18/20 

at 12:07;  Stop 3/18/20 at 18:06;  Status DC


Diphenhydramine HCl (Benadryl) 25 mg 1X PRN  PRN IV ITCHING;  Start 3/18/20 at 

12:15;  Stop 3/19/20 at 12:14;  Status DC


Diphenhydramine HCl (Benadryl) 25 mg 1X PRN  PRN IV ITCHING;  Start 3/18/20 at 

12:15;  Stop 3/19/20 at 12:14;  Status DC


Sodium Chloride 1,000 ml @  400 mls/hr Q2H30M PRN IV PATENCY;  Start 3/18/20 at 

12:07;  Stop 3/19/20 at 00:06;  Status DC


Info (PHARMACY MONITORING -- do not chart) 1 each PRN DAILY  PRN MC SEE 

COMMENTS;  Start 3/18/20 at 12:15;  Stop 3/20/20 at 08:13;  Status DC


Sodium Chloride 90 meq/Calcium Gluconate 10 meq/ Multivitamins 10 ml/Chromium/ 

Copper/Manganese/ Seleni/Zn 1 ml/ Total Parenteral Nutrition/Amino A

cids/Dextrose/ Fat Emulsion Intravenous 55.005 ml  @ 2.292 mls/hr TPN  CONT IV ;

 Start 3/18/20 at 22:00;  Stop 3/18/20 at 12:33;  Status DC


Info (Tpn Per Pharmacy) 1 each PRN DAILY  PRN MC SEE COMMENTS;  Start 3/18/20 at

12:30;  Status UNV


Sodium Chloride 90 meq/Calcium Gluconate 10 meq/ Multivitamins 10 ml/Chromium/ 

Copper/Manganese/ Seleni/Zn 0.5 ml/ Total Parenteral Nutrition/Amino 

Acids/Dextrose/ Fat Emulsion Intravenous 1,512 ml @  63 mls/hr TPN  CONT IV  

Last administered on 3/18/20at 22:06;  Start 3/18/20 at 22:00;  Stop 3/19/20 at 

21:59;  Status DC


Calcium Carbonate/ Glycine (Tums) 500 mg PRN AFTMEALHC  PRN PO INDIGESTION;  

Start 3/18/20 at 17:45


Calcium Gluconate (Calcium Gluconate) 2,000 mg 1X  ONCE IVP  Last administered 

on 3/19/20at 02:19;  Start 3/19/20 at 02:15;  Stop 3/19/20 at 02:16;  Status DC


Calcium Chloride 3000 mg/Sodium Chloride 1,030 ml @  50 mls/hr [id number] IV  Last 

administered on 3/21/20at 02:17;  Start 3/19/20 at 08:00;  Stop 3/21/20 at 

15:23;  Status DC


Lorazepam (Ativan Inj) 1 mg PRN Q4HRS  PRN IVP ANXIETY / AGITATION, 2nd choic 

Last administered on 4/17/20at 03:51;  Start 3/19/20 at 09:00;  Stop 4/17/20 at 

09:19;  Status DC


Sodium Chloride 1,000 ml @  1,000 mls/hr Q1H PRN IV hypotension;  Start 3/19/20 

at 08:56;  Stop 3/19/20 at 14:55;  Status DC


Albumin Human 200 ml @  200 mls/hr 1X PRN  PRN IV Hypotension;  Start 3/19/20 at

09:00;  Stop 3/19/20 at 14:59;  Status DC


Diphenhydramine HCl (Benadryl) 25 mg 1X PRN  PRN IV ITCHING;  Start 3/19/20 at 

09:00;  Stop 3/20/20 at 08:59;  Status DC


Diphenhydramine HCl (Benadryl) 25 mg 1X PRN  PRN IV ITCHING;  Start 3/19/20 at 

09:00;  Stop 3/20/20 at 08:59;  Status DC


Sodium Chloride 1,000 ml @  400 mls/hr Q2H30M PRN IV PATENCY;  Start 3/19/20 at 

08:56;  Stop 3/19/20 at 20:55;  Status DC


Info (PHARMACY MONITORING -- do not chart) 1 each PRN DAILY  PRN MC SEE 

COMMENTS;  Start 3/19/20 at 09:00;  Status UNV


Info (PHARMACY MONITORING -- do not chart) 1 each PRN DAILY  PRN MC SEE 

COMMENTS;  Start 3/19/20 at 09:00;  Stop 3/20/20 at 08:13;  Status DC


Digoxin (Lanoxin) 500 mcg 1X  ONCE IV  Last administered on 3/19/20at 10:04;  

Start 3/19/20 at 10:00;  Stop 3/19/20 at 10:01;  Status DC


Digoxin (Lanoxin) 125 mcg 1X  ONCE IV  Last administered on 3/19/20at 17:10;  

Start 3/19/20 at 18:00;  Stop 3/19/20 at 18:01;  Status DC


Magnesium Sulfate 100 ml @  25 mls/hr 1X  ONCE IV  Last administered on 

3/19/20at 12:48;  Start 3/19/20 at 13:00;  Stop 3/19/20 at 16:59;  Status DC


Sodium Chloride 90 meq/Magnesium Sulfate 10 meq/ Calcium Gluconate 20 meq/ 

Multivitamins 10 ml/Chromium/ Copper/Manganese/ Seleni/Zn 0.5 ml/ Total 

Parenteral Nutrition/Amino Acids/Dextrose/ Fat Emulsion Intravenous 1,512 ml @  

63 mls/hr TPN  CONT IV  Last administered on 3/19/20at 22:25;  Start 3/19/20 at 

22:00;  Stop 3/20/20 at 21:59;  Status DC


Sodium Chloride 1,000 ml @  1,000 mls/hr Q1H PRN IV hypotension;  Start 3/20/20 

at 08:05;  Stop 3/20/20 at 14:04;  Status DC


Albumin Human 200 ml @  200 mls/hr 1X  ONCE IV  Last administered on 3/20/20at 

08:57;  Start 3/20/20 at 08:15;  Stop 3/20/20 at 09:14;  Status DC


Diphenhydramine HCl (Benadryl) 25 mg 1X PRN  PRN IV ITCHING;  Start 3/20/20 at 

08:15;  Stop 3/21/20 at 08:14;  Status DC


Diphenhydramine HCl (Benadryl) 25 mg 1X PRN  PRN IV ITCHING;  Start 3/20/20 at 

08:15;  Stop 3/21/20 at 08:14;  Status DC


Sodium Chloride 1,000 ml @  400 mls/hr Q2H30M PRN IV PATENCY;  Start 3/20/20 at 

08:05;  Stop 3/20/20 at 20:04;  Status DC


Info (PHARMACY MONITORING -- do not chart) 1 each PRN DAILY  PRN MC SEE 

COMMENTS;  Start 3/20/20 at 08:15;  Stop 3/24/20 at 07:57;  Status DC


Sodium Chloride 90 meq/Potassium Chloride 15 meq/ Potassium Phosphate 10 mmol/ 

Magnesium Sulfate 10 meq/Calcium Gluconate 20 meq/ Multivitamins 10 ml/Chromium/

Copper/Manganese/ Seleni/Zn 0.5 ml/ Total Parenteral Nutrition/Amino 

Acids/Dextrose/ Fat Emulsion Intravenous 1,512 ml @  63 mls/hr TPN  CONT IV  

Last administered on 3/20/20at 21:01;  Start 3/20/20 at 22:00;  Stop 3/21/20 at 

21:59;  Status DC


Potassium Chloride/Water 100 ml @  100 mls/hr 1X  ONCE IV  Last administered on 

3/20/20at 14:09;  Start 3/20/20 at 14:00;  Stop 3/20/20 at 14:59;  Status DC


Benzocaine (Hurricaine One) 1 spray 1X  ONCE MM  Last administered on 3/20/20at 

16:38;  Start 3/20/20 at 14:30;  Stop 3/20/20 at 14:31;  Status DC


Lidocaine HCl (Glydo (Lidocaine) Jelly) 1 thomas 1X  ONCE MM  Last administered on 

3/20/20at 16:38;  Start 3/20/20 at 14:30;  Stop 3/20/20 at 14:31;  Status DC


Linezolid/Dextrose 300 ml @  300 mls/hr Q12HR IV  Last administered on 3/26/20at

21:04;  Start 3/20/20 at 20:00;  Stop 3/27/20 at 07:50;  Status DC


Acetaminophen (Tylenol) 650 mg PRN Q6HRS  PRN PO MILD PAIN / TEMP;  Start 

3/21/20 at 03:30;  Stop 3/21/20 at 03:36;  Status DC


Acetaminophen (Tylenol) 650 mg PRN Q6HRS  PRN PEG MILD PAIN / TEMP Last 

administered on 4/16/20at 19:56;  Start 3/21/20 at 03:36


Sodium Chloride 1,000 ml @  1,000 mls/hr Q1H PRN IV hypotension;  Start 3/21/20 

at 07:50;  Stop 3/21/20 at 13:49;  Status DC


Albumin Human 200 ml @  200 mls/hr 1X PRN  PRN IV Hypotension;  Start 3/21/20 at

08:00;  Stop 3/21/20 at 13:59;  Status DC


Sodium Chloride (Normal Saline Flush) 10 ml 1X PRN  PRN IV AP catheter pack;  

Start 3/21/20 at 08:00;  Stop 3/22/20 at 07:59;  Status DC


Sodium Chloride (Normal Saline Flush) 10 ml 1X PRN  PRN IV  catheter pack;  

Start 3/21/20 at 08:00;  Stop 3/22/20 at 07:59;  Status DC


Sodium Chloride 1,000 ml @  400 mls/hr Q2H30M PRN IV PATENCY;  Start 3/21/20 at 

07:50;  Stop 3/21/20 at 19:49;  Status DC


Info (PHARMACY MONITORING -- do not chart) 1 each PRN DAILY  PRN MC SEE 

COMMENTS;  Start 3/21/20 at 08:00;  Status UNV


Info (PHARMACY MONITORING -- do not chart) 1 each PRN DAILY  PRN MC SEE 

COMMENTS;  Start 3/21/20 at 08:00;  Stop 3/23/20 at 08:25;  Status DC


Sodium Chloride 90 meq/Potassium Chloride 15 meq/ Potassium Phosphate 10 mmol/ 

Magnesium Sulfate 10 meq/Calcium Gluconate 20 meq/ Multivitamins 10 ml/Chromium/

Copper/Manganese/ Seleni/Zn 0.5 ml/ Total Parenteral Nutrition/Amino 

Acids/Dextrose/ Fat Emulsion Intravenous 1,512 ml @  63 mls/hr TPN  CONT IV  

Last administered on 3/21/20at 20:57;  Start 3/21/20 at 22:00;  Stop 3/22/20 at 

21:59;  Status DC


Sodium Chloride 90 meq/Potassium Chloride 15 meq/ Potassium Phosphate 15 mmol/ 

Magnesium Sulfate 10 meq/Calcium Gluconate 20 meq/ Multivitamins 10 ml/Chromium/

Copper/Manganese/ Seleni/Zn 0.5 ml/ Total Parenteral Nutrition/Amino 

Acids/Dextrose/ Fat Emulsion Intravenous 1,512 ml @  63 mls/hr TPN  CONT IV ;  

Start 3/22/20 at 22:00;  Stop 3/22/20 at 14:16;  Status DC


Sodium Chloride 90 meq/Potassium Chloride 15 meq/ Potassium Phosphate 15 mmol/ 

Magnesium Sulfate 10 meq/Calcium Gluconate 20 meq/ Multivitamins 10 ml/Chromium/

Copper/Manganese/ Seleni/Zn 0.5 ml/ Total Parenteral Nutrition/Amino 

Acids/Dextrose/ Fat Emulsion Intravenous 1,200 ml @  50 mls/hr TPN  CONT IV ;  

Start 3/22/20 at 22:00;  Stop 3/22/20 at 14:17;  Status DC


Sodium Chloride 90 meq/Potassium Chloride 15 meq/ Potassium Phosphate 10 mmol/ 

Magnesium Sulfate 10 meq/Calcium Gluconate 20 meq/ Multivitamins 10 ml/Chromium/

Copper/Manganese/ Seleni/Zn 0.5 ml/ Total Parenteral Nutrition/Amino 

Acids/Dextrose/ Fat Emulsion Intravenous 1,200 ml @  50 mls/hr TPN  CONT IV  

Last administered on 3/22/20at 23:29;  Start 3/22/20 at 22:00;  Stop 3/23/20 at 

21:59;  Status DC


Sodium Chloride 1,000 ml @  1,000 mls/hr Q1H PRN IV hypotension;  Start 3/23/20 

at 07:28;  Stop 3/23/20 at 13:27;  Status DC


Albumin Human 200 ml @  200 mls/hr 1X  ONCE IV  Last administered on 3/23/20at 

08:51;  Start 3/23/20 at 07:30;  Stop 3/23/20 at 08:29;  Status DC


Diphenhydramine HCl (Benadryl) 25 mg 1X PRN  PRN IV ITCHING;  Start 3/23/20 at 

07:30;  Stop 3/24/20 at 07:29;  Status DC


Diphenhydramine HCl (Benadryl) 25 mg 1X PRN  PRN IV ITCHING;  Start 3/23/20 at 

07:30;  Stop 3/24/20 at 07:29;  Status DC


Sodium Chloride 1,000 ml @  400 mls/hr Q2H30M PRN IV PATENCY;  Start 3/23/20 at 

07:28;  Stop 3/23/20 at 19:27;  Status DC


Info (PHARMACY MONITORING -- do not chart) 1 each PRN DAILY  PRN MC SEE 

COMMENTS;  Start 3/23/20 at 07:30;  Stop 4/3/20 at 13:01;  Status DC


Metronidazole 100 ml @  100 mls/hr Q6HRS IV  Last administered on 4/8/20at 

06:26;  Start 3/23/20 at 08:30;  Stop 4/8/20 at 09:58;  Status DC


Micafungin Sodium 100 mg/Dextrose 100 ml @  100 mls/hr Q24H IV  Last 

administered on 4/30/20at 08:18;  Start 3/23/20 at 09:00;  Stop 4/30/20 at 

20:58;  Status DC


Propofol 0 ml @ As Directed STK-MED ONCE IV ;  Start 3/23/20 at 07:53;  Stop 

3/23/20 at 07:53;  Status DC


Etomidate (Amidate) 20 mg STK-MED ONCE IV ;  Start 3/23/20 at 07:53;  Stop 

3/23/20 at 07:54;  Status DC


Midazolam HCl (Versed) 5 mg STK-MED ONCE .ROUTE ;  Start 3/23/20 at 07:57;  Stop

3/23/20 at 07:57;  Status DC


Fentanyl Citrate 30 ml @ 0 mls/hr CONT  PRN IV SEE PROTOCOL Last administered on

4/17/20at 06:12;  Start 3/23/20 at 08:15;  Stop 4/17/20 at 09:19;  Status DC


Artificial Tears (Artificial Tears) 1 drop PRN Q1HR  PRN OU DRY EYE, 1st choice;

 Start 3/23/20 at 08:15;  Stop 4/29/20 at 05:31;  Status DC


Midazolam HCl 50 mg/Sodium Chloride 50 ml @ 0 mls/hr CONT  PRN IV SEE PROTOCOL L

ast administered on 3/26/20at 22:39;  Start 3/23/20 at 08:15;  Stop 3/28/20 at 

15:59;  Status DC


Etomidate (Amidate) 8 mg 1X  ONCE IV  Last administered on 3/23/20at 08:33;  

Start 3/23/20 at 08:30;  Stop 3/23/20 at 08:31;  Status DC


Succinylcholine Chloride (Anectine) 120 mg 1X  ONCE IV  Last administered on 

3/23/20at 08:34;  Start 3/23/20 at 08:30;  Stop 3/23/20 at 08:31;  Status DC


Midazolam HCl (Versed) 5 mg 1X  ONCE IV ;  Start 3/23/20 at 08:30;  Stop 3/23/20

at 08:31;  Status DC


Potassium Chloride 15 meq/ Bicarbonate Dialysis Soln w/ out KCl 5,007.5 ml  @ 

1,000 mls/ hr Q5H1M IV  Last administered on 3/24/20at 11:11;  Start 3/23/20 at 

12:00;  Stop 3/24/20 at 11:15;  Status DC


Potassium Chloride 15 meq/ Bicarbonate Dialysis Soln w/ out KCl 5,007.5 ml  @ 1

,000 mls/ hr Q5H1M IV  Last administered on 3/24/20at 11:12;  Start 3/23/20 at 

12:00;  Stop 3/24/20 at 11:17;  Status DC


Potassium Chloride 15 meq/ Bicarbonate Dialysis Soln w/ out KCl 5,007.5 ml  @ 

1,000 mls/ hr Q5H1M IV  Last administered on 3/24/20at 11:11;  Start 3/23/20 at 

12:00;  Stop 3/24/20 at 11:19;  Status DC


Sodium Chloride 90 meq/Potassium Chloride 15 meq/ Potassium Phosphate 10 mmol/ 

Magnesium Sulfate 10 meq/Calcium Gluconate 20 meq/ Multivitamins 10 ml/Chromium/

Copper/Manganese/ Seleni/Zn 0.5 ml/ Total Parenteral Nutrition/Amino 

Acids/Dextrose/ Fat Emulsion Intravenous 1,400 ml @  58.333 mls/ hr TPN  CONT IV

 Last administered on 3/23/20at 21:42;  Start 3/23/20 at 22:00;  Stop 3/24/20 at

21:59;  Status DC


Heparin Sodium (Porcine) (Heparin Sodium) 5,000 unit Q8HRS SQ  Last administered

on 3/28/20at 05:55;  Start 3/23/20 at 15:00;  Stop 3/28/20 at 13:28;  Status DC


Meropenem 500 mg/ Sodium Chloride 50 ml @  100 mls/hr Q6HRS IV  Last 

administered on 3/25/20at 06:00;  Start 3/24/20 at 09:00;  Stop 3/25/20 at 

07:29;  Status DC


Potassium Phosphate 20 mmol/ Sodium Chloride 106.6667 ml @  51.667 m... 1X  ONCE

IV  Last administered on 3/24/20at 11:22;  Start 3/24/20 at 10:15;  Stop 3/24/20

at 12:18;  Status DC


Acetaminophen (Tylenol Supp) 650 mg PRN Q6HRS  PRN AL MILD PAIN / TEMP > 100.3'F

Last administered on 5/5/20at 09:12;  Start 3/24/20 at 10:30


Potassium Chloride/Water 100 ml @  100 mls/hr Q1H IV  Last administered on 

3/24/20at 12:12;  Start 3/24/20 at 11:00;  Stop 3/24/20 at 12:59;  Status DC


Potassium Chloride 20 meq/ Bicarbonate Dialysis Soln w/ out KCl 5,010 ml @  

1,000 mls/hr Q5H1M IV  Last administered on 3/25/20at 08:48;  Start 3/24/20 at 

12:00;  Stop 3/25/20 at 13:03;  Status DC


Potassium Chloride 20 meq/ Bicarbonate Dialysis Soln w/ out KCl 5,010 ml @  

1,000 mls/hr Q5H1M IV  Last administered on 3/29/20at 14:52;  Start 3/24/20 at 

11:30;  Stop 3/29/20 at 19:59;  Status DC


Potassium Chloride 20 meq/ Bicarbonate Dialysis Soln w/ out KCl 5,010 ml @  

1,000 mls/hr Q5H1M IV  Last administered on 3/29/20at 14:53;  Start 3/24/20 at 

11:30;  Stop 3/29/20 at 19:59;  Status DC


Sodium Chloride 90 meq/Potassium Chloride 15 meq/ Potassium Phosphate 15 mmol/ 

Magnesium Sulfate 10 meq/Calcium Gluconate 15 meq/ Multivitamins 10 ml/Chromium/

Copper/Manganese/ Seleni/Zn 0.5 ml/ Total Parenteral Nutrition/Amino 

Acids/Dextrose/ Fat Emulsion Intravenous 1,400 ml @  58.333 mls/ hr TPN  CONT IV

 Last administered on 3/24/20at 22:17;  Start 3/24/20 at 22:00;  Stop 3/25/20 at

21:59;  Status DC


Cefepime HCl (Maxipime) 2 gm Q12HR IVP  Last administered on 4/7/20at 20:56;  

Start 3/25/20 at 09:00;  Stop 4/8/20 at 09:58;  Status DC


Daptomycin 500 mg/ Sodium Chloride 50 ml @  100 mls/hr Q48H IV  Last 

administered on 4/10/20at 09:57;  Start 3/25/20 at 08:30;  Stop 4/10/20 at 

10:07;  Status DC


Lidocaine HCl (Buffered Lidocaine 1%) 3 ml 1X  ONCE INJ  Last administered on 

3/25/20at 10:27;  Start 3/25/20 at 10:30;  Stop 3/25/20 at 10:31;  Status DC


Potassium Phosphate 20 mmol/ Sodium Chloride 106.6667 ml @  51.667 m... 1X  ONCE

IV  Last administered on 3/25/20at 12:51;  Start 3/25/20 at 13:00;  Stop 3/25/20

at 15:03;  Status DC


Sodium Chloride 90 meq/Potassium Chloride 15 meq/ Potassium Phosphate 18 mmol/ 

Magnesium Sulfate 8 meq/Calcium Gluconate 15 meq/ Multivitamins 10 ml/Chromium/ 

Copper/Manganese/ Seleni/Zn 0.5 ml/ Total Parenteral Nutrition/Amino 

Acids/Dextrose/ Fat Emulsion Intravenous 1,400 ml @  58.333 mls/ hr TPN  CONT IV

 Last administered on 3/25/20at 22:16;  Start 3/25/20 at 22:00;  Stop 3/26/20 at

21:59;  Status DC


Potassium Chloride 20 meq/ Bicarbonate Dialysis Soln w/ out KCl 5,010 ml @  

1,000 mls/hr Q5H1M IV  Last administered on 3/29/20at 14:54;  Start 3/25/20 at 

16:00;  Stop 3/29/20 at 19:59;  Status DC


Multi-Ingred Cream/Lotion/Oil/ Oint (Artificial Tears Eye Ointment) 1 thomas PRN 

Q1HR  PRN OU DRY EYE, 2nd choice Last administered on 4/13/20at 08:19;  Start 

3/25/20 at 17:30


Sodium Chloride 90 meq/Potassium Chloride 15 meq/ Potassium Phosphate 18 mmol/ 

Magnesium Sulfate 8 meq/Calcium Gluconate 15 meq/ Multivitamins 10 ml/Chromium/ 

Copper/Manganese/ Seleni/Zn 0.5 ml/ Total Parenteral Nutrition/Amino 

Acids/Dextrose/ Fat Emulsion Intravenous 1,400 ml @  58.333 mls/ hr TPN  CONT IV

 Last administered on 3/26/20at 22:00;  Start 3/26/20 at 22:00;  Stop 3/27/20 at

21:59;  Status DC


Albumin Human 500 ml @  125 mls/hr 1X  ONCE IV ;  Start 3/26/20 at 14:15;  Stop 

3/26/20 at 18:14;  Status DC


Sodium Chloride 90 meq/Potassium Chloride 15 meq/ Potassium Phosphate 18 mmol/ 

Magnesium Sulfate 8 meq/Calcium Gluconate 15 meq/ Multivitamins 10 ml/Chromium/ 

Copper/Manganese/ Seleni/Zn 0.5 ml/ Insulin Human Regular 10 unit/ Total 

Parenteral Nutrition/Amino Acids/Dextrose/ Fat Emulsion Intravenous 1,400 ml @  

58.333 mls/ hr TPN  CONT IV  Last administered on 3/27/20at 21:43;  Start 

3/27/20 at 22:00;  Stop 3/28/20 at 21:59;  Status DC


Lidocaine HCl (Buffered Lidocaine 1%) 3 ml STK-MED ONCE .ROUTE ;  Start 3/25/20 

at 10:00;  Stop 3/27/20 at 13:57;  Status DC


Midazolam HCl 100 mg/Sodium Chloride 100 ml @ 7 mls/hr CONT  PRN IV SEE PROTOCOL

Last administered on 4/8/20at 15:35;  Start 3/28/20 at 16:00


Sodium Chloride 90 meq/Potassium Chloride 15 meq/ Potassium Phosphate 18 mmol/ 

Magnesium Sulfate 8 meq/Calcium Gluconate 15 meq/ Multivitamins 10 ml/Chromium/ 

Copper/Manganese/ Seleni/Zn 0.5 ml/ Insulin Human Regular 15 unit/ Total 

Parenteral Nutrition/Amino Acids/Dextrose/ Fat Emulsion Intravenous 1,400 ml @  

58.333 mls/ hr TPN  CONT IV  Last administered on 3/28/20at 20:34;  Start 

3/28/20 at 22:00;  Stop 3/29/20 at 21:59;  Status DC


Info ([facility] Electrolyte Protocol) 1 ea CONT PRN  PRN MC PER PROTOCOL;  Start 

3/29/20 at 13:15


Sodium Chloride 90 meq/Potassium Chloride 15 meq/ Potassium Phosphate 18 mmol/ 

Magnesium Sulfate 8 meq/Calcium Gluconate 15 meq/ Multivitamins 10 ml/Chromium/ 

Copper/Manganese/ Seleni/Zn 0.5 ml/ Insulin Human Regular 15 unit/ Total 

Parenteral Nutrition/Amino Acids/Dextrose/ Fat Emulsion Intravenous 1,400 ml @  

58.333 mls/ hr TPN  CONT IV  Last administered on 3/29/20at 22:05;  Start 

3/29/20 at 22:00;  Stop 3/30/20 at 21:59;  Status DC


Potassium Chloride 15 meq/ Bicarbonate Dialysis Soln w/ out KCl 5,007.5 ml  @ 

1,000 mls/ hr Q5H1M IV  Last administered on 4/1/20at 18:14;  Start 3/29/20 at 

20:00;  Stop 4/2/20 at 13:08;  Status DC


Potassium Chloride 15 meq/ Bicarbonate Dialysis Soln w/ out KCl 5,007.5 ml  @ 

1,000 mls/ hr Q5H1M IV  Last administered on 4/1/20at 18:14;  Start 3/29/20 at 

20:00;  Stop 4/2/20 at 13:08;  Status DC


Potassium Chloride 15 meq/ Bicarbonate Dialysis Soln w/ out KCl 5,007.5 ml  @ 

1,000 mls/ hr Q5H1M IV  Last administered on 4/1/20at 18:14;  Start 3/29/20 at 

20:00;  Stop 4/2/20 at 13:08;  Status DC


Iohexol (Omnipaque 240 Mg/ml) 30 ml 1X  ONCE PO  Last administered on 3/30/20at 

11:30;  Start 3/30/20 at 11:30;  Stop 3/30/20 at 11:33;  Status DC


Info (CONTRAST GIVEN -- Rx MONITORING) 1 each PRN DAILY  PRN MC SEE COMMENTS;  

Start 3/30/20 at 11:45;  Stop 4/1/20 at 11:44;  Status DC


Sodium Chloride 90 meq/Potassium Chloride 15 meq/ Potassium Phosphate 18 mmol/ 

Magnesium Sulfate 8 meq/Calcium Gluconate 15 meq/ Multivitamins 10 ml/Chromium/ 

Copper/Manganese/ Seleni/Zn 0.5 ml/ Insulin Human Regular 15 unit/ Total 

Parenteral Nutrition/Amino Acids/Dextrose/ Fat Emulsion Intravenous 1,400 ml @  

58.333 mls/ hr TPN  CONT IV  Last administered on 3/30/20at 21:47;  Start 

3/30/20 at 22:00;  Stop 3/31/20 at 21:59;  Status DC


Sodium Chloride 90 meq/Potassium Chloride 15 meq/ Potassium Phosphate 18 mmol/ 

Magnesium Sulfate 8 meq/Calcium Gluconate 15 meq/ Multivitamins 10 ml/Chromium/ 

Copper/Manganese/ Seleni/Zn 0.5 ml/ Insulin Human Regular 20 unit/ Total 

Parenteral Nutrition/Amino Acids/Dextrose/ Fat Emulsion Intravenous 1,400 ml @  

58.333 mls/ hr TPN  CONT IV  Last administered on 3/31/20at 21:36;  Start 

3/31/20 at 22:00;  Stop 4/1/20 at 21:59;  Status DC


Alteplase, Recombinant (Cathflo For Central Catheter Clearance) 1 mg 1X  ONCE 

INT CAT  Last administered on 3/31/20at 20:03;  Start 3/31/20 at 19:30;  Stop 

3/31/20 at 19:46;  Status DC


Alteplase, Recombinant (Cathflo For Central Catheter Clearance) 1 mg 1X  ONCE 

INT CAT  Last administered on 3/31/20at 22:05;  Start 3/31/20 at 22:00;  Stop 

3/31/20 at 22:01;  Status DC


Sodium Chloride 90 meq/Potassium Chloride 15 meq/ Potassium Phosphate 18 mmol/ 

Magnesium Sulfate 8 meq/Calcium Gluconate 15 meq/ Multivitamins 10 ml/Chromium/ 

Copper/Manganese/ Seleni/Zn 0.5 ml/ Insulin Human Regular 20 unit/ Total 

Parenteral Nutrition/Amino Acids/Dextrose/ Fat Emulsion Intravenous 1,400 ml @  

58.333 mls/ hr TPN  CONT IV  Last administered on 4/1/20at 21:30;  Start 4/1/20 

at 22:00;  Stop 4/2/20 at 21:59;  Status DC


Dexmedetomidine HCl 400 mcg/ Sodium Chloride 100 ml @ 0 mls/hr CONT  PRN IV 

ANXIETY / AGITATION Last administered on 5/5/20at 11:11;  Start 4/2/20 at 08:15


Sodium Chloride 500 ml @  500 mls/hr 1X PRN  PRN IV ELEVATED BP, SEE COMMENTS;  

Start 4/2/20 at 08:15


Atropine Sulfate (ATROPINE 0.5mg SYRINGE) 0.5 mg PRN Q5MIN  PRN IV SEE COMMENTS;

 Start 4/2/20 at 08:15


Furosemide (Lasix) 20 mg 1X  ONCE IVP  Last administered on 4/2/20at 08:19;  

Start 4/2/20 at 08:15;  Stop 4/2/20 at 08:16;  Status DC


Lidocaine HCl (Buffered Lidocaine 1%) 3 ml STK-MED ONCE .ROUTE ;  Start 4/2/20 

at 08:39;  Stop 4/2/20 at 08:39;  Status DC


Lidocaine HCl (Buffered Lidocaine 1%) 6 ml 1X  ONCE INJ  Last administered on 

4/2/20at 09:05;  Start 4/2/20 at 09:00;  Stop 4/2/20 at 09:06;  Status DC


Sodium Chloride 90 meq/Potassium Chloride 15 meq/ Potassium Phosphate 18 mmol/ 

Magnesium Sulfate 8 meq/Calcium Gluconate 15 meq/ Multivitamins 10 ml/Chromium/ 

Copper/Manganese/ Seleni/Zn 0.5 ml/ Insulin Human Regular 20 unit/ Total 

Parenteral Nutrition/Amino Acids/Dextrose/ Fat Emulsion Intravenous 1,400 ml @  

58.333 mls/ hr TPN  CONT IV  Last administered on 4/2/20at 22:45;  Start 4/2/20 

at 22:00;  Stop 4/3/20 at 21:59;  Status DC


Sodium Chloride 1,000 ml @  1,000 mls/hr Q1H PRN IV hypotension;  Start 4/3/20 

at 07:30;  Stop 4/3/20 at 13:29;  Status DC


Albumin Human 200 ml @  200 mls/hr 1X PRN  PRN IV Hypotension Last administered 

on 4/3/20at 09:36;  Start 4/3/20 at 07:30;  Stop 4/3/20 at 13:29;  Status DC


Sodium Chloride (Normal Saline Flush) 10 ml 1X PRN  PRN IV AP catheter pack;  

Start 4/3/20 at 07:30;  Stop 4/3/20 at 21:29;  Status DC


Sodium Chloride (Normal Saline Flush) 10 ml 1X PRN  PRN IV  catheter pack;  

Start 4/3/20 at 07:30;  Stop 4/4/20 at 07:29;  Status DC


Sodium Chloride 1,000 ml @  400 mls/hr Q2H30M PRN IV PATENCY;  Start 4/3/20 at 

07:30;  Stop 4/3/20 at 19:29;  Status DC


Info (PHARMACY MONITORING -- do not chart) 1 each PRN DAILY  PRN MC SEE 

COMMENTS;  Start 4/3/20 at 07:30;  Stop 4/3/20 at 13:02;  Status DC


Info (PHARMACY MONITORING -- do not chart) 1 each PRN DAILY  PRN MC SEE 

COMMENTS;  Start 4/3/20 at 07:30;  Stop 4/5/20 at 12:45;  Status DC


Sodium Chloride 90 meq/Potassium Chloride 15 meq/ Potassium Phosphate 10 mmol/ 

Magnesium Sulfate 8 meq/Calcium Gluconate 15 meq/ Multivitamins 10 ml/Chromium/ 

Copper/Manganese/ Seleni/Zn 0.5 ml/ Insulin Human Regular 25 unit/ Total 

Parenteral Nutrition/Amino Acids/Dextrose/ Fat Emulsion Intravenous 1,400 ml @  

58.333 mls/ hr TPN  CONT IV  Last administered on 4/3/20at 22:19;  Start 4/3/20 

at 22:00;  Stop 4/4/20 at 21:59;  Status DC


Heparin Sodium (Porcine) (Heparin Sodium) 5,000 unit Q12HR SQ  Last administered

on 4/26/20at 08:59;  Start 4/3/20 at 21:00;  Stop 4/26/20 at 10:05;  Status DC


Ondansetron HCl (Zofran) 4 mg PRN Q6HRS  PRN IV NAUSEA/VOMITING;  Start 4/6/20 

at 07:00;  Stop 4/7/20 at 06:59;  Status DC


Fentanyl Citrate (Fentanyl 2ml Vial) 25 mcg PRN Q5MIN  PRN IV MILD PAIN 1-3;  

Start 4/6/20 at 07:00;  Stop 4/7/20 at 06:59;  Status DC


Fentanyl Citrate (Fentanyl 2ml Vial) 50 mcg PRN Q5MIN  PRN IV MODERATE TO SEVERE

PAIN;  Start 4/6/20 at 07:00;  Stop 4/7/20 at 06:59;  Status DC


Ringer's Solution 1,000 ml @  30 mls/hr Q24H IV ;  Start 4/6/20 at 07:00;  Stop 

4/6/20 at 18:59;  Status DC


Lidocaine HCl (Xylocaine-Mpf 1% 2ml Vial) 2 ml PRN 1X  PRN ID PRIOR TO IV START;

 Start 4/6/20 at 07:00;  Stop 4/7/20 at 06:59;  Status DC


Prochlorperazine Edisylate (Compazine) 5 mg PACU PRN  PRN IV NAUSEA, MRX1;  

Start 4/6/20 at 07:00;  Stop 4/7/20 at 06:59;  Status DC


Sodium Chloride 1,000 ml @  1,000 mls/hr Q1H PRN IV hypotension;  Start 4/4/20 

at 09:10;  Stop 4/4/20 at 15:09;  Status DC


Albumin Human 200 ml @  200 mls/hr 1X PRN  PRN IV Hypotension Last administered 

on 4/4/20at 10:10;  Start 4/4/20 at 09:15;  Stop 4/4/20 at 15:14;  Status DC


Sodium Chloride 1,000 ml @  400 mls/hr Q2H30M PRN IV PATENCY;  Start 4/4/20 at 

09:10;  Stop 4/4/20 at 21:09;  Status DC


Info (PHARMACY MONITORING -- do not chart) 1 each PRN DAILY  PRN MC SEE 

COMMENTS;  Start 4/4/20 at 09:15;  Stop 4/5/20 at 12:45;  Status DC


Info (PHARMACY MONITORING -- do not chart) 1 each PRN DAILY  PRN MC SEE 

COMMENTS;  Start 4/4/20 at 09:15;  Stop 4/5/20 at 12:45;  Status DC


Sodium Chloride 90 meq/Potassium Chloride 15 meq/ Potassium Phosphate 10 mmol/ 

Magnesium Sulfate 8 meq/Calcium Gluconate 15 meq/ Multivitamins 10 ml/Chromium/ 

Copper/Manganese/ Seleni/Zn 0.5 ml/ Insulin Human Regular 25 unit/ Total 

Parenteral Nutrition/Amino Acids/Dextrose/ Fat Emulsion Intravenous 1,400 ml @  

58.333 mls/ hr TPN  CONT IV  Last administered on 4/4/20at 22:10;  Start 4/4/20 

at 22:00;  Stop 4/5/20 at 21:59;  Status DC


Magnesium Sulfate 50 ml @ 25 mls/hr PRN DAILY  PRN IV for Mag < 1.7 on am labs 

Last administered on 4/20/20at 17:27;  Start 4/5/20 at 09:15


Sodium Chloride 90 meq/Potassium Chloride 15 meq/ Potassium Phosphate 10 mmol/ 

Magnesium Sulfate 8 meq/Calcium Gluconate 15 meq/ Multivitamins 10 ml/Chromium/ 

Copper/Manganese/ Seleni/Zn 0.5 ml/ Insulin Human Regular 25 unit/ Total 

Parenteral Nutrition/Amino Acids/Dextrose/ Fat Emulsion Intravenous 1,400 ml @  

58.333 mls/ hr TPN  CONT IV  Last administered on 4/5/20at 21:20;  Start 4/5/20 

at 22:00;  Stop 4/6/20 at 21:59;  Status DC


Sodium Chloride 1,000 ml @  1,000 mls/hr Q1H PRN IV hypotension;  Start 4/5/20 

at 12:23;  Stop 4/5/20 at 18:22;  Status DC


Albumin Human 200 ml @  200 mls/hr 1X  ONCE IV  Last administered on 4/5/20at 

13:34;  Start 4/5/20 at 12:30;  Stop 4/5/20 at 13:29;  Status DC


Diphenhydramine HCl (Benadryl) 25 mg 1X PRN  PRN IV ITCHING;  Start 4/5/20 at 

12:30;  Stop 4/6/20 at 12:29;  Status DC


Diphenhydramine HCl (Benadryl) 25 mg 1X PRN  PRN IV ITCHING;  Start 4/5/20 at 

12:30;  Stop 4/6/20 at 12:29;  Status DC


Info (PHARMACY MONITORING -- do not chart) 1 each PRN DAILY  PRN MC SEE 

COMMENTS;  Start 4/5/20 at 12:30;  Status Cancel


Bupivacaine HCl/ Epinephrine Bitart (Sensorcain-Epi 0.5%-1:[id number] Mpf) 30 ml 

STK-MED ONCE .ROUTE  Last administered on 4/6/20at 11:44;  Start 4/6/20 at 

11:00;  Stop 4/6/20 at 11:01;  Status DC


Cellulose (Surgicel Fibrillar 1x2) 1 each STK-MED ONCE .ROUTE ;  Start 4/6/20 at

11:00;  Stop 4/6/20 at 11:01;  Status DC


Sodium Chloride 90 meq/Potassium Chloride 15 meq/ Potassium Phosphate 10 mmol/ 

Magnesium Sulfate 12 meq/Calcium Gluconate 15 meq/ Multivitamins 10 ml/Chromium/

Copper/Manganese/ Seleni/Zn 0.5 ml/ Insulin Human Regular 25 unit/ Total Paren

teral Nutrition/Amino Acids/Dextrose/ Fat Emulsion Intravenous 1,400 ml @  

58.333 mls/ hr TPN  CONT IV  Last administered on 4/6/20at 22:24;  Start 4/6/20 

at 22:00;  Stop 4/7/20 at 21:59;  Status DC


Propofol 20 ml @ As Directed STK-MED ONCE IV ;  Start 4/6/20 at 11:07;  Stop 

4/6/20 at 11:07;  Status DC


Cellulose (Surgicel Hemostat 4x8) 1 each STK-MED ONCE .ROUTE  Last administered 

on 4/6/20at 11:44;  Start 4/6/20 at 11:55;  Stop 4/6/20 at 11:56;  Status DC


Sevoflurane (Ultane) 60 ml STK-MED ONCE IH ;  Start 4/6/20 at 12:46;  Stop 

4/6/20 at 12:46;  Status DC


Sodium Chloride 1,000 ml @  1,000 mls/hr Q1H PRN IV hypotension;  Start 4/6/20 

at 13:51;  Stop 4/6/20 at 19:50;  Status DC


Albumin Human 200 ml @  200 mls/hr 1X PRN  PRN IV Hypotension Last administered 

on 4/6/20at 14:51;  Start 4/6/20 at 14:00;  Stop 4/6/20 at 19:59;  Status DC


Diphenhydramine HCl (Benadryl) 25 mg 1X PRN  PRN IV ITCHING;  Start 4/6/20 at 

14:00;  Stop 4/7/20 at 13:59;  Status DC


Diphenhydramine HCl (Benadryl) 25 mg 1X PRN  PRN IV ITCHING;  Start 4/6/20 at 

14:00;  Stop 4/7/20 at 13:59;  Status DC


Sodium Chloride 1,000 ml @  400 mls/hr Q2H30M PRN IV PATENCY;  Start 4/6/20 at 

13:51;  Stop 4/7/20 at 01:50;  Status DC


Info (PHARMACY MONITORING -- do not chart) 1 each PRN DAILY  PRN MC SEE MIKEY

TS;  Start 4/6/20 at 14:00;  Stop 4/9/20 at 08:16;  Status DC


Heparin Sodium (Porcine) (Hep Lock Adult) 500 unit STK-MED ONCE IVP ;  Start 

4/7/20 at 09:29;  Stop 4/7/20 at 09:30;  Status DC


Sodium Chloride 1,000 ml @  1,000 mls/hr Q1H PRN IV hypotension;  Start 4/7/20 

at 10:43;  Stop 4/7/20 at 16:42;  Status DC


Sodium Chloride 1,000 ml @  400 mls/hr Q2H30M PRN IV PATENCY;  Start 4/7/20 at 

10:43;  Stop 4/7/20 at 22:42;  Status DC


Info (PHARMACY MONITORING -- do not chart) 1 each PRN DAILY  PRN MC SEE COMMENT

S;  Start 4/7/20 at 10:45;  Status UNV


Info (PHARMACY MONITORING -- do not chart) 1 each PRN DAILY  PRN MC SEE 

COMMENTS;  Start 4/7/20 at 10:45;  Status UNV


Sodium Chloride 90 meq/Potassium Chloride 15 meq/ Magnesium Sulfate 12 

meq/Calcium Gluconate 15 meq/ Multivitamins 10 ml/Chromium/ Copper/Manganese/ 

Seleni/Zn 0.5 ml/ Insulin Human Regular 25 unit/ Total Parenteral Nutrition/Am

yas Acids/Dextrose/ Fat Emulsion Intravenous 1,400 ml @  58.333 mls/ hr TPN  

CONT IV  Last administered on 4/7/20at 22:13;  Start 4/7/20 at 22:00;  Stop 

4/8/20 at 21:59;  Status DC


Sodium Chloride 1,000 ml @  1,000 mls/hr Q1H PRN IV hypotension;  Start 4/8/20 

at 07:50;  Stop 4/8/20 at 13:49;  Status DC


Albumin Human 200 ml @  200 mls/hr 1X  ONCE IV ;  Start 4/8/20 at 08:00;  Stop 4 /8/20 at 08:53;  Status DC


Diphenhydramine HCl (Benadryl) 25 mg 1X PRN  PRN IV ITCHING;  Start 4/8/20 at 

08:00;  Stop 4/9/20 at 07:59;  Status DC


Diphenhydramine HCl (Benadryl) 25 mg 1X PRN  PRN IV ITCHING;  Start 4/8/20 at 

08:00;  Stop 4/9/20 at 07:59;  Status DC


Info (PHARMACY MONITORING -- do not chart) 1 each PRN DAILY  PRN MC SEE 

COMMENTS;  Start 4/8/20 at 08:00;  Stop 4/9/20 at 08:16;  Status DC


Albumin Human 50 ml @ 50 mls/hr 1X  ONCE IV ;  Start 4/8/20 at 08:53;  Stop 

4/8/20 at 08:56;  Status DC


Albumin Human 200 ml @  50 mls/hr PRN 1X  PRN IV HYPOTENSION Last administered 

on 4/14/20at 11:54;  Start 4/8/20 at 09:00


Meropenem 500 mg/ Sodium Chloride 50 ml @  100 mls/hr Q12H IV  Last administered

on 4/28/20at 10:45;  Start 4/8/20 at 10:00;  Stop 4/28/20 at 12:37;  Status DC


Sodium Chloride 90 meq/Magnesium Sulfate 12 meq/ Calcium Gluconate 15 meq/ 

Multivitamins 10 ml/Chromium/ Copper/Manganese/ Seleni/Zn 0.5 ml/ Insulin Human 

Regular 25 unit/ Total Parenteral Nutrition/Amino Acids/Dextrose/ Fat Emulsion 

Intravenous 1,400 ml @  58.333 mls/ hr TPN  CONT IV  Last administered on 

4/8/20at 21:41;  Start 4/8/20 at 22:00;  Stop 4/9/20 at 21:59;  Status DC


Sodium Chloride 1,000 ml @  1,000 mls/hr Q1H PRN IV hypotension;  Start 4/9/20 

at 07:58;  Stop 4/9/20 at 13:57;  Status DC


Albumin Human 200 ml @  200 mls/hr 1X PRN  PRN IV Hypotension Last administered 

on 4/9/20at 09:30;  Start 4/9/20 at 08:00;  Stop 4/9/20 at 13:59;  Status DC


Sodium Chloride 1,000 ml @  400 mls/hr Q2H30M PRN IV PATENCY;  Start 4/9/20 at 

07:58;  Stop 4/9/20 at 19:57;  Status DC


Info (PHARMACY MONITORING -- do not chart) 1 each PRN DAILY  PRN MC SEE 

COMMENTS;  Start 4/9/20 at 08:00;  Status Cancel


Info (PHARMACY MONITORING -- do not chart) 1 each PRN DAILY  PRN MC SEE 

COMMENTS;  Start 4/9/20 at 08:15;  Status UNV


Sodium Chloride 90 meq/Potassium Phosphate 5 mmol/ Magnesium Sulfate 12 

meq/Calcium Gluconate 15 meq/ Multivitamins 10 ml/Chromium/ Copper/Manganese/ 

Seleni/Zn 0.5 ml/ Insulin Human Regular 30 unit/ Total Parenteral 

Nutrition/Amino Acids/Dextrose/ Fat Emulsion Intravenous 1,400 ml @  58.333 mls/

hr TPN  CONT IV  Last administered on 4/9/20at 22:08;  Start 4/9/20 at 22:00;  

Stop 4/10/20 at 21:59;  Status DC


Linezolid/Dextrose 300 ml @  300 mls/hr Q12HR IV  Last administered on 4/20/20at

20:40;  Start 4/10/20 at 11:00;  Stop 4/21/20 at 08:10;  Status DC


Sodium Chloride 90 meq/Potassium Phosphate 15 mmol/ Magnesium Sulfate 12 

meq/Calcium Gluconate 15 meq/ Multivitamins 10 ml/Chromium/ Copper/Manganese/ 

Seleni/Zn 0.5 ml/ Insulin Human Regular 30 unit/ Total Parenteral 

Nutrition/Amino Acids/Dextrose/ Fat Emulsion Intravenous 1,400 ml @  58.333 mls/

hr TPN  CONT IV  Last administered on 4/10/20at 21:49;  Start 4/10/20 at 22:00; 

Stop 4/11/20 at 21:59;  Status DC


Sodium Chloride 90 meq/Potassium Phosphate 15 mmol/ Magnesium Sulfate 12 

meq/Calcium Gluconate 15 meq/ Multivitamins 10 ml/Chromium/ Copper/Manganese/ 

Seleni/Zn 0.5 ml/ Insulin Human Regular 40 unit/ Total Parenteral 

Nutrition/Amino Acids/Dextrose/ Fat Emulsion Intravenous 1,400 ml @  58.333 mls/

hr TPN  CONT IV  Last administered on 4/11/20at 21:21;  Start 4/11/20 at 22:00; 

Stop 4/12/20 at 21:59;  Status DC


Sodium Chloride 1,000 ml @  1,000 mls/hr Q1H PRN IV hypotension;  Start 4/11/20 

at 13:26;  Stop 4/11/20 at 19:25;  Status DC


Albumin Human 200 ml @  200 mls/hr 1X PRN  PRN IV Hypotension Last administered 

on 4/11/20at 15:00;  Start 4/11/20 at 13:30;  Stop 4/11/20 at 19:29;  Status DC


Sodium Chloride (Normal Saline Flush) 10 ml 1X PRN  PRN IV AP catheter pack;  

Start 4/11/20 at 13:30;  Stop 4/12/20 at 13:29;  Status DC


Sodium Chloride (Normal Saline Flush) 10 ml 1X PRN  PRN IV  catheter pack;  

Start 4/11/20 at 13:30;  Stop 4/12/20 at 13:29;  Status DC


Sodium Chloride 1,000 ml @  400 mls/hr Q2H30M PRN IV PATENCY;  Start 4/11/20 at 

13:26;  Stop 4/12/20 at 01:25;  Status DC


Info (PHARMACY MONITORING -- do not chart) 1 each PRN DAILY  PRN MC SEE 

COMMENTS;  Start 4/11/20 at 13:30;  Stop 4/11/20 at 13:33;  Status DC


Info (PHARMACY MONITORING -- do not chart) 1 each PRN DAILY  PRN MC SEE VITO

NTS;  Start 4/11/20 at 13:30;  Stop 4/11/20 at 13:34;  Status DC


Sodium Chloride 90 meq/Potassium Phosphate 19 mmol/ Magnesium Sulfate 12 

meq/Calcium Gluconate 15 meq/ Multivitamins 10 ml/Chromium/ Copper/Manganese/ 

Seleni/Zn 0.5 ml/ Insulin Human Regular 40 unit/ Total Parenteral 

Nutrition/Amino Acids/Dextrose/ Fat Emulsion Intravenous 1,400 ml @  58.333 mls/

hr TPN  CONT IV  Last administered on 4/12/20at 21:54;  Start 4/12/20 at 22:00; 

Stop 4/13/20 at 21:59;  Status DC


Sodium Chloride 1,000 ml @  1,000 mls/hr Q1H PRN IV hypotension;  Start 4/13/20 

at 09:35;  Stop 4/13/20 at 15:34;  Status DC


Albumin Human 200 ml @  200 mls/hr 1X PRN  PRN IV Hypotension;  Start 4/13/20 at

09:45;  Stop 4/13/20 at 15:44;  Status DC


Diphenhydramine HCl (Benadryl) 25 mg 1X PRN  PRN IV ITCHING;  Start 4/13/20 at 

09:45;  Stop 4/14/20 at 09:44;  Status DC


Diphenhydramine HCl (Benadryl) 25 mg 1X PRN  PRN IV ITCHING;  Start 4/13/20 at 

09:45;  Stop 4/14/20 at 09:44;  Status DC


Sodium Chloride 1,000 ml @  400 mls/hr Q2H30M PRN IV PATENCY;  Start 4/13/20 at 

09:35;  Stop 4/13/20 at 21:34;  Status DC


Info (PHARMACY MONITORING -- do not chart) 1 each PRN DAILY  PRN MC SEE 

COMMENTS;  Start 4/13/20 at 09:45;  Status Cancel


Sodium Chloride 100 meq/Potassium Phosphate 19 mmol/ Magnesium Sulfate 12 

meq/Calcium Gluconate 15 meq/ Multivitamins 10 ml/Chromium/ Copper/Manganese/ 

Seleni/Zn 0.5 ml/ Insulin Human Regular 40 unit/ Potassium Chloride 20 meq/ 

Total Parenteral Nutrition/Amino Acids/Dextrose/ Fat Emulsion Intravenous 1,400 

ml @  58.333 mls/ hr TPN  CONT IV  Last administered on 4/13/20at 22:02;  Start 

4/13/20 at 22:00;  Stop 4/14/20 at 21:59;  Status DC


Furosemide (Lasix) 40 mg 1X  ONCE IVP  Last administered on 4/13/20at 14:39;  

Start 4/13/20 at 14:30;  Stop 4/13/20 at 14:31;  Status DC


Metronidazole 100 ml @  100 mls/hr Q8HRS IV  Last administered on 4/21/20at 

06:04;  Start 4/14/20 at 10:00;  Stop 4/21/20 at 08:10;  Status DC


Sodium Chloride 1,000 ml @  1,000 mls/hr Q1H PRN IV hypotension;  Start 4/14/20 

at 08:00;  Stop 4/14/20 at 13:59;  Status DC


Albumin Human 200 ml @  200 mls/hr 1X PRN  PRN IV Hypotension;  Start 4/14/20 at

08:00;  Stop 4/14/20 at 13:59;  Status DC


Sodium Chloride 1,000 ml @  400 mls/hr Q2H30M PRN IV PATENCY;  Start 4/14/20 at 

08:00;  Stop 4/14/20 at 19:59;  Status DC


Info (PHARMACY MONITORING -- do not chart) 1 each PRN DAILY  PRN MC SEE 

COMMENTS;  Start 4/14/20 at 11:30;  Status UNV


Info (PHARMACY MONITORING -- do not chart) 1 each PRN DAILY  PRN MC SEE 

COMMENTS;  Start 4/14/20 at 11:30;  Stop 4/16/20 at 12:13;  Status DC


Sodium Chloride 100 meq/Potassium Phosphate 19 mmol/ Magnesium Sulfate 12 

meq/Calcium Gluconate 15 meq/ Multivitamins 10 ml/Chromium/ Copper/Manganese/ 

Seleni/Zn 0.5 ml/ Insulin Human Regular 40 unit/ Potassium Chloride 20 meq/ 

Total Parenteral Nutrition/Amino Acids/Dextrose/ Fat Emulsion Intravenous 1,400 

ml @  58.333 mls/ hr TPN  CONT IV  Last administered on 4/14/20at 21:52;  Start 

4/14/20 at 22:00;  Stop 4/15/20 at 21:59;  Status DC


Sodium Chloride (Normal Saline Flush) 10 ml QSHIFT  PRN IV AFTER MEDS AND BLOOD 

DRAWS;  Start 4/14/20 at 15:00


Sodium Chloride (Normal Saline Flush) 10 ml PRN Q5MIN  PRN IV AFTER MEDS AND 

BLOOD DRAWS;  Start 4/14/20 at 15:00


Sodium Chloride (Normal Saline Flush) 20 ml PRN Q5MIN  PRN IV AFTER MEDS AND 

BLOOD DRAWS;  Start 4/14/20 at 15:00


Sodium Chloride 100 meq/Potassium Phosphate 19 mmol/ Magnesium Sulfate 12 

meq/Calcium Gluconate 15 meq/ Multivitamins 10 ml/Chromium/ Copper/Manganese/ 

Seleni/Zn 0.5 ml/ Insulin Human Regular 40 unit/ Potassium Chloride 20 meq/ 

Total Parenteral Nutrition/Amino Acids/Dextrose/ Fat Emulsion Intravenous 1,400 

ml @  58.333 mls/ hr TPN  CONT IV  Last administered on 4/15/20at 21:20;  Start 

4/15/20 at 22:00;  Stop 4/16/20 at 21:59;  Status DC


Lidocaine HCl (Buffered Lidocaine 1%) 3 ml STK-MED ONCE .ROUTE ;  Start 4/15/20 

at 13:16;  Stop 4/15/20 at 13:16;  Status DC


Lidocaine HCl (Buffered Lidocaine 1%) 6 ml 1X  ONCE INJ  Last administered on 

4/15/20at 13:45;  Start 4/15/20 at 13:30;  Stop 4/15/20 at 13:31;  Status DC


Albumin Human 100 ml @  100 mls/hr 1X  ONCE IV  Last administered on 4/15/20at 

15:41;  Start 4/15/20 at 15:00;  Stop 4/15/20 at 15:59;  Status DC


Albumin Human 50 ml @ 50 mls/hr 1X  ONCE IV  Last administered on 4/15/20at 

15:00;  Start 4/15/20 at 15:00;  Stop 4/15/20 at 15:59;  Status DC


Info (PHARMACY MONITORING -- do not chart) 1 each PRN DAILY  PRN MC SEE 

COMMENTS;  Start 4/16/20 at 11:30;  Status Cancel


Info (PHARMACY MONITORING -- do not chart) 1 each PRN DAILY  PRN MC SEE 

COMMENTS;  Start 4/16/20 at 11:30;  Status UNV


Sodium Chloride 100 meq/Potassium Phosphate 10 mmol/ Magnesium Sulfate 12 

meq/Calcium Gluconate 15 meq/ Multivitamins 10 ml/Chromium/ Copper/Manganese/ 

Seleni/Zn 0.5 ml/ Insulin Human Regular 35 unit/ Potassium Chloride 20 meq/ 

Total Parenteral Nutrition/Amino Acids/Dextrose/ Fat Emulsion Intravenous 1,400 

ml @  58.333 mls/ hr TPN  CONT IV  Last administered on 4/16/20at 22:10;  Start 

4/16/20 at 22:00;  Stop 4/17/20 at 21:59;  Status DC


Sodium Chloride 100 meq/Potassium Phosphate 5 mmol/ Magnesium Sulfate 12 

meq/Calcium Gluconate 15 meq/ Multivitamins 10 ml/Chromium/ Copper/Manganese/ 

Seleni/Zn 0.5 ml/ Insulin Human Regular 35 unit/ Potassium Chloride 20 meq/ 

Total Parenteral Nutrition/Amino Acids/Dextrose/ Fat Emulsion Intravenous 1,400 

ml @  58.333 mls/ hr TPN  CONT IV  Last administered on 4/17/20at 22:59;  Start 

4/17/20 at 22:00;  Stop 4/18/20 at 21:59;  Status DC


Sodium Chloride 1,000 ml @  1,000 mls/hr Q1H PRN IV hypotension;  Start 4/18/20 

at 08:27;  Stop 4/18/20 at 14:26;  Status DC


Albumin Human 200 ml @  200 mls/hr 1X PRN  PRN IV Hypotension Last administered 

on 4/18/20at 09:18;  Start 4/18/20 at 08:30;  Stop 4/18/20 at 14:29;  Status DC


Sodium Chloride 1,000 ml @  400 mls/hr Q2H30M PRN IV PATENCY;  Start 4/18/20 at 

08:27;  Stop 4/18/20 at 20:26;  Status DC


Info (PHARMACY MONITORING -- do not chart) 1 each PRN DAILY  PRN MC SEE 

COMMENTS;  Start 4/18/20 at 08:30;  Status Cancel


Info (PHARMACY MONITORING -- do not chart) 1 each PRN DAILY  PRN MC SEE 

COMMENTS;  Start 4/18/20 at 08:30;  Stop 4/26/20 at 13:10;  Status DC


Sodium Chloride 100 meq/Potassium Chloride 40 meq/ Magnesium Sulfate 15 

meq/Calcium Gluconate 15 meq/ Multivitamins 10 ml/Chromium/ Copper/Manganese/ 

Seleni/Zn 0.5 ml/ Insulin Human Regular 35 unit/ Total Parenteral 

Nutrition/Amino Acids/Dextrose/ Fat Emulsion Intravenous 1,400 ml @  58.333 mls/

hr TPN  CONT IV  Last administered on 4/18/20at 22:00;  Start 4/18/20 at 22:00; 

Stop 4/19/20 at 21:59;  Status DC


Potassium Chloride/Water 100 ml @  100 mls/hr 1X  ONCE IV  Last administered on 

4/18/20at 17:28;  Start 4/18/20 at 14:45;  Stop 4/18/20 at 15:44;  Status DC


Sodium Chloride 100 meq/Potassium Chloride 40 meq/ Magnesium Sulfate 15 

meq/Calcium Gluconate 15 meq/ Multivitamins 10 ml/Chromium/ Copper/Manganese/ 

Seleni/Zn 0.5 ml/ Insulin Human Regular 35 unit/ Total Parenteral 

Nutrition/Amino Acids/Dextrose/ Fat Emulsion Intravenous 1,400 ml @  58.333 mls/

hr TPN  CONT IV  Last administered on 4/19/20at 22:46;  Start 4/19/20 at 22:00; 

Stop 4/20/20 at 21:59;  Status DC


Sodium Chloride 100 meq/Potassium Chloride 40 meq/ Magnesium Sulfate 20 

meq/Calcium Gluconate 15 meq/ Multivitamins 10 ml/Chromium/ Copper/Manganese/ 

Seleni/Zn 0.5 ml/ Insulin Human Regular 35 unit/ Total Parenteral 

Nutrition/Amino Acids/Dextrose/ Fat Emulsion Intravenous 1,400 ml @  58.333 mls/

hr TPN  CONT IV  Last administered on 4/20/20at 22:31;  Start 4/20/20 at 22:00; 

Stop 4/21/20 at 21:59;  Status DC


Fentanyl Citrate (Fentanyl 2ml Vial) 50 mcg PRN Q2HR  PRN IVP PAIN Last 

administered on 4/27/20at 13:32;  Start 4/20/20 at 21:00;  Stop 4/28/20 at 

12:53;  Status DC


Fentanyl Citrate (Fentanyl 2ml Vial) 25 mcg PRN Q2HR  PRN IVP PAIN;  Start 

4/20/20 at 21:00;  Stop 4/28/20 at 12:54;  Status DC


Enoxaparin Sodium (Lovenox 100mg Syringe) 100 mg Q12HR SQ ;  Start 4/21/20 at 

21:00;  Status UNV


Amino Acids/ Glycerin/ Electrolytes 1,000 ml @  75 mls/hr [id number] IV ;  Start 

4/20/20 at 21:15;  Status UNV


Sodium Chloride 1,000 ml @  1,000 mls/hr Q1H PRN IV hypotension;  Start 4/21/20 

at 07:56;  Stop 4/21/20 at 13:55;  Status DC


Albumin Human 200 ml @  200 mls/hr 1X PRN  PRN IV Hypotension Last administered 

on 4/21/20at 08:40;  Start 4/21/20 at 08:00;  Stop 4/21/20 at 13:59;  Status DC


Sodium Chloride 1,000 ml @  400 mls/hr Q2H30M PRN IV PATENCY;  Start 4/21/20 at 

07:56;  Stop 4/21/20 at 19:55;  Status DC


Info (PHARMACY MONITORING -- do not chart) 1 each PRN DAILY  PRN MC SEE 

COMMENTS;  Start 4/21/20 at 08:00;  Status UNV


Info (PHARMACY MONITORING -- do not chart) 1 each PRN DAILY  PRN MC SEE 

COMMENTS;  Start 4/21/20 at 08:00;  Status UNV


Daptomycin 430 mg/ Sodium Chloride 50 ml @  100 mls/hr Q24H IV  Last 

administered on 4/21/20at 12:35;  Start 4/21/20 at 09:00;  Stop 4/21/20 at 12:49

;  Status DC


Sodium Chloride 100 meq/Potassium Chloride 40 meq/ Magnesium Sulfate 20 

meq/Calcium Gluconate 15 meq/ Multivitamins 10 ml/Chromium/ Copper/Manganese/ 

Seleni/Zn 0.5 ml/ Insulin Human Regular 35 unit/ Total Parenteral 

Nutrition/Amino Acids/Dextrose/ Fat Emulsion Intravenous 1,400 ml @  58.333 mls/

hr TPN  CONT IV  Last administered on 4/21/20at 21:26;  Start 4/21/20 at 22:00; 

Stop 4/22/20 at 21:59;  Status DC


Daptomycin 430 mg/ Sodium Chloride 50 ml @  100 mls/hr Q48H IV ;  Start 4/23/20 

at 09:00;  Stop 4/22/20 at 11:55;  Status DC


Sodium Chloride 100 meq/Potassium Chloride 40 meq/ Magnesium Sulfate 20 

meq/Calcium Gluconate 15 meq/ Multivitamins 10 ml/Chromium/ Copper/Manganese/ 

Seleni/Zn 0.5 ml/ Insulin Human Regular 35 unit/ Total Parenteral Nu

trition/Amino Acids/Dextrose/ Fat Emulsion Intravenous 1,400 ml @  58.333 mls/ 

hr TPN  CONT IV  Last administered on 4/22/20at 22:27;  Start 4/22/20 at 22:00; 

Stop 4/23/20 at 21:59;  Status DC


Daptomycin 430 mg/ Sodium Chloride 50 ml @  100 mls/hr Q24H IV  Last 

administered on 4/24/20at 15:07;  Start 4/22/20 at 13:00;  Stop 4/25/20 at 

13:15;  Status DC


Sodium Chloride 100 meq/Potassium Chloride 40 meq/ Magnesium Sulfate 20 

meq/Calcium Gluconate 10 meq/ Multivitamins 10 ml/Chromium/ Copper/Manganese/ 

Seleni/Zn 0.5 ml/ Insulin Human Regular 35 unit/ Total Parenteral 

Nutrition/Amino Acids/Dextrose/ Fat Emulsion Intravenous 1,400 ml @  58.333 mls/

hr TPN  CONT IV  Last administered on 4/24/20at 00:06;  Start 4/23/20 at 22:00; 

Stop 4/24/20 at 21:59;  Status DC


Alteplase, Recombinant (Cathflo For Central Catheter Clearance) 1 mg 1X  ONCE 

INT CAT  Last administered on 4/24/20at 11:44;  Start 4/24/20 at 10:45;  Stop 

4/24/20 at 10:46;  Status DC


Ondansetron HCl (Zofran) 4 mg PRN Q6HRS  PRN IV NAUSEA/VOMITING;  Start 4/27/20 

at 07:00;  Stop 4/28/20 at 06:59;  Status DC


Fentanyl Citrate (Fentanyl 2ml Vial) 25 mcg PRN Q5MIN  PRN IV MILD PAIN 1-3;  

Start 4/27/20 at 07:00;  Stop 4/28/20 at 06:59;  Status DC


Fentanyl Citrate (Fentanyl 2ml Vial) 50 mcg PRN Q5MIN  PRN IV MODERATE TO SEVERE

PAIN Last administered on 4/27/20at 10:17;  Start 4/27/20 at 07:00;  Stop 

4/28/20 at 06:59;  Status DC


Ringer's Solution 1,000 ml @  30 mls/hr Q24H IV ;  Start 4/27/20 at 07:00;  Stop

4/27/20 at 18:59;  Status DC


Lidocaine HCl (Xylocaine-Mpf 1% 2ml Vial) 2 ml PRN 1X  PRN ID PRIOR TO IV START;

 Start 4/27/20 at 07:00;  Stop 4/28/20 at 06:59;  Status DC


Prochlorperazine Edisylate (Compazine) 5 mg PACU PRN  PRN IV NAUSEA, MRX1;  

Start 4/27/20 at 07:00;  Stop 4/28/20 at 06:59;  Status DC


Sodium Acetate 50 meq/Potassium Acetate 55 meq/ Magnesium Sulfate 20 meq/Calcium

Gluconate 10 meq/ Multivitamins 10 ml/Chromium/ Copper/Manganese/ Seleni/Zn 0.5 

ml/ Insulin Human Regular 35 unit/ Total Parenteral Nutrition/Amino 

Acids/Dextrose/ Fat Emulsion Intravenous 1,400 ml @  58.333 mls/ hr TPN  CONT IV

;  Start 4/24/20 at 22:00;  Stop 4/24/20 at 14:15;  Status DC


Sodium Acetate 50 meq/Potassium Acetate 55 meq/ Magnesium Sulfate 20 meq/Calcium

Gluconate 10 meq/ Multivitamins 10 ml/Chromium/ Copper/Manganese/ Seleni/Zn 0.5 

ml/ Insulin Human Regular 35 unit/ Total Parenteral Nutrition/Amino 

Acids/Dextrose/ Fat Emulsion Intravenous 1,800 ml @  75 mls/hr TPN  CONT IV  

Last administered on 4/24/20at 22:38;  Start 4/24/20 at 22:00;  Stop 4/25/20 at 

21:59;  Status DC


Sodium Chloride 1,000 ml @  1,000 mls/hr Q1H PRN IV hypotension;  Start 4/24/20 

at 15:31;  Stop 4/24/20 at 21:30;  Status DC


Diphenhydramine HCl (Benadryl) 25 mg 1X PRN  PRN IV ITCHING;  Start 4/24/20 at 

15:45;  Stop 4/25/20 at 15:44;  Status DC


Diphenhydramine HCl (Benadryl) 25 mg 1X PRN  PRN IV ITCHING;  Start 4/24/20 at 

15:45;  Stop 4/25/20 at 15:44;  Status DC


Sodium Chloride 1,000 ml @  400 mls/hr Q2H30M PRN IV PATENCY;  Start 4/24/20 at 

15:31;  Stop 4/25/20 at 03:30;  Status DC


Info (PHARMACY MONITORING -- do not chart) 1 each PRN DAILY  PRN MC SEE 

COMMENTS;  Start 4/24/20 at 15:45


Sodium Acetate 50 meq/Potassium Acetate 55 meq/ Magnesium Sulfate 20 meq/Calcium

Gluconate 10 meq/ Multivitamins 10 ml/Chromium/ Copper/Manganese/ Seleni/Zn 0.5 

ml/ Insulin Human Regular 35 unit/ Total Parenteral Nutrition/Amino 

Acids/Dextrose/ Fat Emulsion Intravenous 1,800 ml @  75 mls/hr TPN  CONT IV  

Last administered on 4/25/20at 22:03;  Start 4/25/20 at 22:00;  Stop 4/26/20 at 

21:59;  Status DC


Daptomycin 430 mg/ Sodium Chloride 50 ml @  100 mls/hr Q24H IV  Last 

administered on 4/30/20at 13:00;  Start 4/25/20 at 13:00;  Stop 4/30/20 at 

20:58;  Status DC


Heparin Sodium (Porcine) 1000 unit/Sodium Chloride 1,001 ml @  1,001 mls/hr 1X  

ONCE IRR ;  Start 4/27/20 at 06:00;  Stop 4/27/20 at 06:59;  Status DC


Potassium Acetate 55 meq/Magnesium Sulfate 20 meq/ Calcium Gluconate 10 meq/ 

Multivitamins 10 ml/Chromium/ Copper/Manganese/ Seleni/Zn 0.5 ml/ Insulin Human 

Regular 35 unit/ Total Parenteral Nutrition/Amino Acids/Dextrose/ Fat Emulsion 

Intravenous 1,920 ml @  80 mls/hr TPN  CONT IV  Last administered on 4/26/20at 

22:10;  Start 4/26/20 at 22:00;  Stop 4/27/20 at 21:59;  Status DC


Dexamethasone Sodium Phosphate (Decadron) 4 mg STK-MED ONCE .ROUTE ;  Start 

4/27/20 at 10:56;  Stop 4/27/20 at 10:57;  Status DC


Ondansetron HCl (Zofran) 4 mg STK-MED ONCE .ROUTE ;  Start 4/27/20 at 10:56;  

Stop 4/27/20 at 10:57;  Status DC


Rocuronium Bromide (Zemuron) 50 mg STK-MED ONCE .ROUTE ;  Start 4/27/20 at 

10:56;  Stop 4/27/20 at 10:57;  Status DC


Fentanyl Citrate (Fentanyl 2ml Vial) 100 mcg STK-MED ONCE .ROUTE ;  Start 

4/27/20 at 10:56;  Stop 4/27/20 at 10:57;  Status DC


Bupivacaine HCl/ Epinephrine Bitart (Sensorcain-Epi 0.5%-1:954475 Mpf) 30 ml 

STK-MED ONCE .ROUTE  Last administered on 4/27/20at 12:01;  Start 4/27/20 at 

10:58;  Stop 4/27/20 at 10:58;  Status DC


Cellulose (Surgicel Hemostat 2x14) 1 each STK-MED ONCE .ROUTE ;  Start 4/27/20 

at 10:58;  Stop 4/27/20 at 10:59;  Status DC


Iohexol (Omnipaque 300 Mg/ml) 50 ml STK-MED ONCE .ROUTE ;  Start 4/27/20 at 

10:58;  Stop 4/27/20 at 10:59;  Status DC


Cellulose (Surgicel Hemostat 4x8) 1 each STK-MED ONCE .ROUTE ;  Start 4/27/20 at

10:58;  Stop 4/27/20 at 10:59;  Status DC


Bisacodyl (Dulcolax Supp) 10 mg STK-MED ONCE .ROUTE ;  Start 4/27/20 at 10:59;  

Stop 4/27/20 at 10:59;  Status DC


Heparin Sodium (Porcine) 1000 unit/Sodium Chloride 1,001 ml @  1,001 mls/hr 1X  

ONCE IRR ;  Start 4/27/20 at 12:00;  Stop 4/27/20 at 12:59;  Status DC


Propofol 20 ml @ As Directed STK-MED ONCE IV ;  Start 4/27/20 at 11:05;  Stop 

4/27/20 at 11:05;  Status DC


Sevoflurane (Ultane) 90 ml STK-MED ONCE IH ;  Start 4/27/20 at 11:05;  Stop 

4/27/20 at 11:05;  Status DC


Sevoflurane (Ultane) 60 ml STK-MED ONCE IH ;  Start 4/27/20 at 12:26;  Stop 

4/27/20 at 12:27;  Status DC


Propofol 20 ml @ As Directed STK-MED ONCE IV ;  Start 4/27/20 at 12:26;  Stop 

4/27/20 at 12:27;  Status DC


Phenylephrine HCl (PHENYLEPHRINE in 0.9% NACL PF) 1 mg STK-MED ONCE IV ;  Start 

4/27/20 at 12:34;  Stop 4/27/20 at 12:34;  Status DC


Heparin Sodium (Porcine) (Heparin Sodium) 5,000 unit Q12HR SQ  Last administered

on 5/5/20at 09:12;  Start 4/27/20 at 21:00


Sodium Chloride (Normal Saline Flush) 3 ml QSHIFT  PRN IV AFTER MEDS AND BLOOD 

DRAWS;  Start 4/27/20 at 13:45


Naloxone HCl (Narcan) 0.4 mg PRN Q2MIN  PRN IV SEE INSTRUCTIONS;  Start 4/27/20 

at 13:45


Sodium Chloride 1,000 ml @  25 mls/hr Q24H IV  Last administered on 5/3/20at 

19:49;  Start 4/27/20 at 13:37


Naloxone HCl (Narcan) 0.4 mg PRN Q2MIN  PRN IV SEE INSTRUCTIONS;  Start 4/27/20 

at 14:30;  Status UNV


Sodium Chloride 1,000 ml @  25 mls/hr Q24H IV ;  Start 4/27/20 at 14:30;  Status

UNV


Hydromorphone HCl 30 ml @ 0 mls/hr CONT PRN  PRN IV PER PROTOCOL Last 

administered on 5/2/20at 16:08;  Start 4/27/20 at 14:30;  Stop 5/4/20 at 08:55; 

Status DC


Potassium Acetate 55 meq/Magnesium Sulfate 20 meq/ Calcium Gluconate 10 meq/ 

Multivitamins 10 ml/Chromium/ Copper/Manganese/ Seleni/Zn 0.5 ml/ Insulin Human 

Regular 35 unit/ Total Parenteral Nutrition/Amino Acids/Dextrose/ Fat Emulsion 

Intravenous 1,920 ml @  80 mls/hr TPN  CONT IV  Last administered on 4/27/20at 

22:01;  Start 4/27/20 at 22:00;  Stop 4/28/20 at 21:59;  Status DC


Bumetanide (Bumex) 2 mg [id number] IV  Last administered on 5/1/20at 13:50;  Start 

4/28/20 at 14:00;  Stop 5/2/20 at 14:10;  Status DC


Meropenem 1 gm/ Sodium Chloride 100 ml @  200 mls/hr Q8HRS IV  Last administered

on 5/5/20at 06:10;  Start 4/28/20 at 14:00


Potassium Acetate 55 meq/Magnesium Sulfate 20 meq/ Calcium Gluconate 10 meq/ 

Multivitamins 10 ml/Chromium/ Copper/Manganese/ Seleni/Zn 0.5 ml/ Insulin Human 

Regular 35 unit/ Total Parenteral Nutrition/Amino Acids/Dextrose/ Fat Emulsion 

Intravenous 1,920 ml @  80 mls/hr TPN  CONT IV  Last administered on 4/28/20at 

22:02;  Start 4/28/20 at 22:00;  Stop 4/29/20 at 21:59;  Status DC


Hydromorphone HCl (Dilaudid Standard PCA) 12 mg STK-MED ONCE IV ;  Start 4/27/20

at 14:35;  Stop 4/28/20 at 13:53;  Status DC


Artificial Tears (Artificial Tears) 1 drop PRN Q15MIN  PRN OU DRY EYE Last 

administered on 5/5/20at 09:10;  Start 4/29/20 at 05:30


Hydromorphone HCl (Dilaudid Standard PCA) 12 mg STK-MED ONCE IV ;  Start 4/28/20

at 12:05;  Stop 4/29/20 at 09:15;  Status DC


Potassium Acetate 65 meq/Magnesium Sulfate 20 meq/ Calcium Gluconate 10 meq/ 

Multivitamins 10 ml/Chromium/ Copper/Manganese/ Seleni/Zn 0.5 ml/ Insulin Human 

Regular 30 unit/ Total Parenteral Nutrition/Amino Acids/Dextrose/ Fat Emulsion 

Intravenous 1,920 ml @  80 mls/hr TPN  CONT IV  Last administered on 4/29/20at 

22:22;  Start 4/29/20 at 22:00;  Stop 4/30/20 at 21:59;  Status DC


Cyclobenzaprine HCl (Flexeril) 10 mg PRN Q6HRS  PRN PO MUSCLE SPASMS;  Start 

4/30/20 at 10:45


Potassium Acetate 55 meq/Magnesium Sulfate 20 meq/ Calcium Gluconate 10 meq/ 

Multivitamins 10 ml/Chromium/ Copper/Manganese/ Seleni/Zn 0.5 ml/ Insulin Human 

Regular 30 unit/ Total Parenteral Nutrition/Amino Acids/Dextrose/ Fat Emulsion 

Intravenous 1,920 ml @  80 mls/hr TPN  CONT IV  Last administered on 5/1/20at 

01:00;  Start 4/30/20 at 22:00;  Stop 5/1/20 at 21:59;  Status DC


Magnesium Sulfate 50 ml @ 25 mls/hr 1X  ONCE IV  Last administered on 4/30/20at 

17:18;  Start 4/30/20 at 12:45;  Stop 4/30/20 at 14:44;  Status DC


Potassium Chloride/Water 100 ml @  100 mls/hr 1X  ONCE IV  Last administered on 

5/1/20at 11:27;  Start 5/1/20 at 12:00;  Stop 5/1/20 at 12:59;  Status DC


Hydromorphone HCl (Dilaudid Standard PCA) 12 mg STK-MED ONCE IV ;  Start 4/29/20

at 10:50;  Stop 5/1/20 at 11:02;  Status DC


Hydromorphone HCl (Dilaudid Standard PCA) 12 mg STK-MED ONCE IV ;  Start 4/30/20

at 13:47;  Stop 5/1/20 at 11:03;  Status DC


Potassium Acetate 30 meq/Magnesium Sulfate 20 meq/ Calcium Gluconate 10 meq/ 

Multivitamins 10 ml/Chromium/ Copper/Manganese/ Seleni/Zn 0.5 ml/ Insulin Human 

Regular 30 unit/ Potassium Chloride 30 meq/ Total Parenteral Nutrition/Amino 

Acids/Dextrose/ Fat Emulsion Intravenous 1,920 ml @  80 mls/hr TPN  CONT IV  

Last administered on 5/1/20at 22:34;  Start 5/1/20 at 22:00;  Stop 5/2/20 at 

21:59;  Status DC


Potassium Chloride/Water 100 ml @  100 mls/hr Q1H IV  Last administered on 

5/2/20at 13:05;  Start 5/2/20 at 07:00;  Stop 5/2/20 at 10:59;  Status DC


Magnesium Sulfate 50 ml @ 25 mls/hr 1X  ONCE IV  Last administered on 5/2/20at 

10:34;  Start 5/2/20 at 10:30;  Stop 5/2/20 at 12:29;  Status DC


Potassium Chloride 75 meq/ Magnesium Sulfate 20 meq/Calcium Gluconate 10 meq/ 

Multivitamins 10 ml/Chromium/ Copper/Manganese/ Seleni/Zn 0.5 ml/ Insulin Human 

Regular 30 unit/ Total Parenteral Nutrition/Amino Acids/Dextrose/ Fat Emulsion 

Intravenous 1,920 ml @  80 mls/hr TPN  CONT IV  Last administered on 5/2/20at 

21:51;  Start 5/2/20 at 22:00;  Stop 5/3/20 at 22:00;  Status DC


Potassium Chloride 75 meq/ Magnesium Sulfate 20 meq/Calcium Gluconate 10 meq/ 

Multivitamins 10 ml/Chromium/ Copper/Manganese/ Seleni/Zn 0.5 ml/ Insulin Human 

Regular 25 unit/ Total Parenteral Nutrition/Amino Acids/Dextrose/ Fat Emulsion 

Intravenous 1,920 ml @  80 mls/hr TPN  CONT IV  Last administered on 5/3/20at 

22:04;  Start 5/3/20 at 22:00;  Stop 5/4/20 at 21:59;  Status DC


Hydromorphone HCl (Dilaudid) 0.4 mg PRN Q4HRS  PRN IVP PAIN Last administered on

5/4/20at 10:57;  Start 5/4/20 at 09:00;  Stop 5/4/20 at 18:59;  Status DC


Micafungin Sodium 100 mg/Dextrose 100 ml @  100 mls/hr Q24H IV  Last 

administered on 5/5/20at 12:21;  Start 5/4/20 at 11:00


Daptomycin 485 mg/ Sodium Chloride 50 ml @  100 mls/hr Q24H IV  Last adm

inistered on 5/5/20at 11:28;  Start 5/4/20 at 11:00


Potassium Chloride 75 meq/ Magnesium Sulfate 15 meq/Calcium Gluconate 8 meq/ 

Multivitamins 10 ml/Chromium/ Copper/Manganese/ Seleni/Zn 0.5 ml/ Insulin Human 

Regular 25 unit/ Total Parenteral Nutrition/Amino Acids/Dextrose/ Fat Emulsion 

Intravenous 1,920 ml @  80 mls/hr TPN  CONT IV  Last administered on 5/4/20at 

23:08;  Start 5/4/20 at 22:00;  Stop 5/5/20 at 21:59


Haloperidol Lactate (Haldol Inj) 3 mg 1X  ONCE IVP  Last administered on 

5/4/20at 14:37;  Start 5/4/20 at 14:30;  Stop 5/4/20 at 14:31;  Status DC


Hydromorphone HCl (Dilaudid) 1 mg PRN Q4HRS  PRN IVP PAIN Last administered on 

5/5/20at 09:11;  Start 5/4/20 at 19:00


Potassium Chloride 75 meq/ Magnesium Sulfate 15 meq/Calcium Gluconate 8 meq/ 

Multivitamins 10 ml/Chromium/ Copper/Manganese/ Seleni/Zn 0.5 ml/ Insulin Human 

Regular 20 unit/ Total Parenteral Nutrition/Amino Acids/Dextrose/ Fat Emulsion 

Intravenous 1,920 ml @  80 mls/hr TPN  CONT IV ;  Start 5/5/20 at 22:00;  Stop 

5/6/20 at 21:59





Active Scripts


Active


Reported


Bisoprolol Fumarate 5 Mg Tablet 10 Mg PO DAILY


Vitals/I & O





Vital Sign - Last 24 Hours








 5/4/20 5/4/20 5/4/20 5/4/20





 15:00 15:40 16:00 16:00


 


Temp    97.0





    97.0


 


Pulse 118   111


 


Resp 25   30


 


B/P (MAP) 118/97 (104)   117/65 (82)


 


Pulse Ox 97 99  99


 


O2 Delivery Tracheal Collar Tracheal Collar Trach Collar Tracheal Collar


 


O2 Flow Rate 10.0 10.0 10.0 10.0


 


    





    





 5/4/20 5/4/20 5/4/20 5/4/20





 17:00 18:00 19:00 19:55


 


Pulse 106 115 106 


 


Resp 27 32 28 


 


B/P (MAP) 113/58 (76) 140/67 (91) 116/66 (83) 


 


Pulse Ox 98 100 99 98


 


O2 Delivery Tracheal Collar Tracheal Collar Tracheal Collar Tracheal Collar


 


O2 Flow Rate 10.0 10.0 8.0 8.0





 5/4/20 5/4/20 5/4/20 5/4/20





 20:00 20:00 21:00 21:12


 


Temp 98.4   





 98.4   


 


Pulse 118  132 


 


Resp 35  38 


 


B/P (MAP) 152/79 (103)  169/83 (111) 


 


Pulse Ox 99  100 100


 


O2 Delivery Tracheal Collar Trach Collar Tracheal Collar Tracheal Collar


 


O2 Flow Rate 8.0 8.0 8.0 8.0


 


    





    





 5/4/20 5/4/20 5/4/20 5/5/20





 22:00 23:00 23:45 00:00


 


Pulse 124 120  


 


Resp 28 28  


 


B/P (MAP) 156/73 (100) 160/81 (107)  


 


Pulse Ox 98 99 99 


 


O2 Delivery Tracheal Collar Tracheal Collar Ventilator Mechanical Ventilator


 


O2 Flow Rate 8.0 8.0  





 5/5/20 5/5/20 5/5/20 5/5/20





 00:00 01:00 01:30 02:00


 


Temp 99.2   





 99.2   


 


Pulse 86 87  118


 


Resp 26 24  28


 


B/P (MAP) 95/54 (68) 99/58 (72)  152/88 (109)


 


Pulse Ox 93 96 96 99


 


O2 Delivery Ventilator Ventilator Ventilator Ventilator


 


    





    





 5/5/20 5/5/20 5/5/20 5/5/20





 02:32 03:00 04:00 04:00


 


Temp   97.5 





   97.5 


 


Pulse  111 114 


 


Resp 26 23 28 


 


B/P (MAP)  169/86 (113) 127/59 (81) 


 


Pulse Ox 100 99 98 


 


O2 Delivery Ventilator Ventilator Ventilator Mechanical Ventilator


 


    





    





 5/5/20 5/5/20 5/5/20 5/5/20





 05:00 05:00 06:00 07:00


 


Temp    102.4





    102.4


 


Pulse  99 91 90


 


Resp  17 18 18


 


B/P (MAP)  115/63 (80) 94/56 (69) 89/48 (62)


 


Pulse Ox 95 98 97 97


 


O2 Delivery Ventilator Ventilator Ventilator Ventilator


 


    





    





 5/5/20 5/5/20 5/5/20 5/5/20





 07:30 07:46 08:00 08:00


 


Temp 100.2   





 100.2   


 


Pulse   92 


 


Resp   18 


 


B/P (MAP)   100/54 (69) 


 


Pulse Ox  97 97 


 


O2 Delivery  Ventilator Tracheal Collar Trach Collar


 


O2 Flow Rate    10.0


 


    





    





 5/5/20 5/5/20 5/5/20 5/5/20





 09:00 09:00 09:11 10:00


 


Temp 98.4   





 98.4   


 


Pulse 132   122


 


Resp 51  35 34


 


B/P (MAP) 138/86 (103)   122/96 (105)


 


Pulse Ox 94 98 95 96


 


O2 Delivery Tracheal Collar Tracheal Collar Tracheal Collar Tracheal Collar


 


O2 Flow Rate   10.0 


 


    





    





 5/5/20 5/5/20 5/5/20 5/5/20





 11:00 11:45 12:00 12:00


 


Temp   98.5 





   98.5 


 


Pulse 117  102 


 


Resp 32 24 31 


 


B/P (MAP) 127/68 (87)  125/64 (84) 


 


Pulse Ox 98 97 99 


 


O2 Delivery Tracheal Collar Tracheal Collar Tracheal Collar Trach Collar


 


O2 Flow Rate  10.0  10.0


 


    





    





 5/5/20   





 13:16   


 


Pulse 96   


 


Resp 30   


 


B/P (MAP) 117/60 (79)   


 


Pulse Ox 99   


 


O2 Delivery Tracheal Collar   














Intake and Output   


 


 5/4/20 5/4/20 5/5/20





 15:00 23:00 07:00


 


Intake Total 150 ml 1569 ml 1333 ml


 


Output Total 640 ml 745 ml 555 ml


 


Balance -490 ml 824 ml 778 ml











Hemodynamically unstable?:  No


Is patient in severe pain?:  Yes


Is NPO status required?:  Yes











HIRAM BARRERA MD              May 5, 2020 14:04

## 2020-05-05 NOTE — PDOC
PULMONARY PROGRESS NOTES


Subjective


Patient intubated on 3/23 , s/p trach 4/6, awake alert follows commands


Vitals





Vital Signs








  Date Time  Temp Pulse Resp B/P (MAP) Pulse Ox O2 Delivery O2 Flow Rate FiO2


 


5/5/20 07:46     97 Ventilator  


 


5/5/20 06:00  91 18 94/56 (69)    


 


5/5/20 04:00 97.5       





 97.5       


 


5/4/20 23:00       8.0 








ROS:  No Nausea, No Chest Pain, No Abdominal Pain, No Increase Cough


General:  Alert, No acute distress


HEENT:  Other (nc at perrl   nose clear  [facility]  trach site ok  no lad   no 

thyromegaly)


Lungs:  Crackles


Cardiovascular:  S1, S2


Abdomen:  Soft, Non-tender, Other (firm)


Neuro Exam:  Alert


Extremities:  Other (+3 generalized edema )


Skin:  Warm, Dry


Labs





Laboratory Tests








Test


 5/3/20


12:47 5/3/20


16:09 5/3/20


17:57 5/4/20


06:10


 


Glucose (Fingerstick)


 126 mg/dL


(70-99) 


 148 mg/dL


(70-99) 





 


O2 Saturation  93 % (92-99)   


 


Arterial Blood pH


 


 7.47


(7.35-7.45) 


 





 


Arterial Blood pCO2 at


Patient Temp 


 49 mmHg


(35-46) 


 





 


Arterial Blood pO2 at Patient


Temp 


 73 mmHg


() 


 





 


Arterial Blood HCO3


 


 35 mmol/L


(21-28) 


 





 


Arterial Blood Base Excess


 


 10 mmol/L


(-3-3) 


 





 


Oxyhemoglobin  92.7 %   


 


Methemoglobin


 


 0.4 %


(0.0-1.9) 


 





 


Carbon Monoxide, Quantitative


 


 0.3 %


(0.0-1.9) 


 





 


FiO2  30   


 


Sodium Level


 


 


 


 154 mmol/L


(136-145)


 


Potassium Level


 


 


 


 4.0 mmol/L


(3.5-5.1)


 


Chloride Level


 


 


 


 114 mmol/L


()


 


Carbon Dioxide Level


 


 


 


 33 mmol/L


(21-32)


 


Anion Gap    7 (6-14) 


 


Blood Urea Nitrogen


 


 


 


 45 mg/dL


(7-20)


 


Creatinine


 


 


 


 0.9 mg/dL


(0.6-1.0)


 


Estimated GFR


(Cockcroft-Gault) 


 


 


 66.5 





 


BUN/Creatinine Ratio    50 (6-20) 


 


Glucose Level


 


 


 


 164 mg/dL


(70-99)


 


Calcium Level


 


 


 


 8.8 mg/dL


(8.5-10.1)


 


Phosphorus Level


 


 


 


 3.4 mg/dL


(2.6-4.7)


 


Magnesium Level


 


 


 


 2.2 mg/dL


(1.8-2.4)


 


Total Bilirubin


 


 


 


 0.5 mg/dL


(0.2-1.0)


 


Aspartate Amino Transf


(AST/SGOT) 


 


 


 39 U/L (15-37) 





 


Alanine Aminotransferase


(ALT/SGPT) 


 


 


 36 U/L (14-59) 





 


Alkaline Phosphatase


 


 


 


 127 U/L


()


 


Total Protein


 


 


 


 5.6 g/dL


(6.4-8.2)


 


Albumin


 


 


 


 1.8 g/dL


(3.4-5.0)


 


Albumin/Globulin Ratio    0.5 (1.0-1.7) 


 


Triglycerides Level


 


 


 


 174 mg/dL


(0-150)


 


Test


 5/4/20


06:15 5/4/20


10:30 5/4/20


11:58 5/4/20


18:17


 


Glucose (Fingerstick)


 153 mg/dL


(70-99) 


 182 mg/dL


(70-99) 145 mg/dL


(70-99)


 


White Blood Count


 


 9.9 x10^3/uL


(4.0-11.0) 


 





 


Red Blood Count


 


 2.49 x10^6/uL


(3.50-5.40) 


 





 


Hemoglobin


 


 7.1 g/dL


(12.0-15.5) 


 





 


Hematocrit


 


 22.7 %


(36.0-47.0) 


 





 


Mean Corpuscular Volume  91 fL ()   


 


Mean Corpuscular Hemoglobin  29 pg (25-35)   


 


Mean Corpuscular Hemoglobin


Concent 


 31 g/dL


(31-37) 


 





 


Red Cell Distribution Width


 


 18.9 %


(11.5-14.5) 


 





 


Platelet Count


 


 458 x10^3/uL


(140-400) 


 





 


Neutrophils (%) (Auto)  74 % (31-73)   


 


Lymphocytes (%) (Auto)  19 % (24-48)   


 


Monocytes (%) (Auto)  5 % (0-9)   


 


Eosinophils (%) (Auto)  2 % (0-3)   


 


Basophils (%) (Auto)  0 % (0-3)   


 


Neutrophils # (Auto)


 


 7.3 x10^3/uL


(1.8-7.7) 


 





 


Lymphocytes # (Auto)


 


 1.9 x10^3/uL


(1.0-4.8) 


 





 


Monocytes # (Auto)


 


 0.5 x10^3/uL


(0.0-1.1) 


 





 


Eosinophils # (Auto)


 


 0.1 x10^3/uL


(0.0-0.7) 


 





 


Basophils # (Auto)


 


 0.0 x10^3/uL


(0.0-0.2) 


 





 


Test


 5/5/20


00:21 5/5/20


06:35 5/5/20


06:38 





 


Glucose (Fingerstick)


 153 mg/dL


(70-99) 


 102 mg/dL


(70-99) 





 


White Blood Count


 


 7.3 x10^3/uL


(4.0-11.0) 


 





 


Red Blood Count


 


 2.55 x10^6/uL


(3.50-5.40) 


 





 


Hemoglobin


 


 7.3 g/dL


(12.0-15.5) 


 





 


Hematocrit


 


 23.2 %


(36.0-47.0) 


 





 


Mean Corpuscular Volume  91 fL ()   


 


Mean Corpuscular Hemoglobin  29 pg (25-35)   


 


Mean Corpuscular Hemoglobin


Concent 


 32 g/dL


(31-37) 


 





 


Red Cell Distribution Width


 


 18.5 %


(11.5-14.5) 


 





 


Platelet Count


 


 370 x10^3/uL


(140-400) 


 





 


Neutrophils (%) (Auto)  75 % (31-73)   


 


Lymphocytes (%) (Auto)  17 % (24-48)   


 


Monocytes (%) (Auto)  5 % (0-9)   


 


Eosinophils (%) (Auto)  3 % (0-3)   


 


Basophils (%) (Auto)  1 % (0-3)   


 


Neutrophils # (Auto)


 


 5.5 x10^3/uL


(1.8-7.7) 


 





 


Lymphocytes # (Auto)


 


 1.2 x10^3/uL


(1.0-4.8) 


 





 


Monocytes # (Auto)


 


 0.4 x10^3/uL


(0.0-1.1) 


 





 


Eosinophils # (Auto)


 


 0.2 x10^3/uL


(0.0-0.7) 


 





 


Basophils # (Auto)


 


 0.0 x10^3/uL


(0.0-0.2) 


 





 


Sodium Level


 


 153 mmol/L


(136-145) 


 





 


Potassium Level


 


 4.1 mmol/L


(3.5-5.1) 


 





 


Chloride Level


 


 114 mmol/L


() 


 





 


Carbon Dioxide Level


 


 33 mmol/L


(21-32) 


 





 


Anion Gap  6 (6-14)   


 


Blood Urea Nitrogen


 


 41 mg/dL


(7-20) 


 





 


Creatinine


 


 0.9 mg/dL


(0.6-1.0) 


 





 


Estimated GFR


(Cockcroft-Gault) 


 66.5 


 


 





 


Glucose Level


 


 104 mg/dL


(70-99) 


 





 


Calcium Level


 


 8.6 mg/dL


(8.5-10.1) 


 





 


Phosphorus Level


 


 3.2 mg/dL


(2.6-4.7) 


 





 


Magnesium Level


 


 2.0 mg/dL


(1.8-2.4) 


 











Laboratory Tests








Test


 5/4/20


10:30 5/4/20


11:58 5/4/20


18:17 5/5/20


00:21


 


White Blood Count


 9.9 x10^3/uL


(4.0-11.0) 


 


 





 


Red Blood Count


 2.49 x10^6/uL


(3.50-5.40) 


 


 





 


Hemoglobin


 7.1 g/dL


(12.0-15.5) 


 


 





 


Hematocrit


 22.7 %


(36.0-47.0) 


 


 





 


Mean Corpuscular Volume 91 fL ()    


 


Mean Corpuscular Hemoglobin 29 pg (25-35)    


 


Mean Corpuscular Hemoglobin


Concent 31 g/dL


(31-37) 


 


 





 


Red Cell Distribution Width


 18.9 %


(11.5-14.5) 


 


 





 


Platelet Count


 458 x10^3/uL


(140-400) 


 


 





 


Neutrophils (%) (Auto) 74 % (31-73)    


 


Lymphocytes (%) (Auto) 19 % (24-48)    


 


Monocytes (%) (Auto) 5 % (0-9)    


 


Eosinophils (%) (Auto) 2 % (0-3)    


 


Basophils (%) (Auto) 0 % (0-3)    


 


Neutrophils # (Auto)


 7.3 x10^3/uL


(1.8-7.7) 


 


 





 


Lymphocytes # (Auto)


 1.9 x10^3/uL


(1.0-4.8) 


 


 





 


Monocytes # (Auto)


 0.5 x10^3/uL


(0.0-1.1) 


 


 





 


Eosinophils # (Auto)


 0.1 x10^3/uL


(0.0-0.7) 


 


 





 


Basophils # (Auto)


 0.0 x10^3/uL


(0.0-0.2) 


 


 





 


Glucose (Fingerstick)


 


 182 mg/dL


(70-99) 145 mg/dL


(70-99) 153 mg/dL


(70-99)


 


Test


 5/5/20


06:35 5/5/20


06:38 


 





 


White Blood Count


 7.3 x10^3/uL


(4.0-11.0) 


 


 





 


Red Blood Count


 2.55 x10^6/uL


(3.50-5.40) 


 


 





 


Hemoglobin


 7.3 g/dL


(12.0-15.5) 


 


 





 


Hematocrit


 23.2 %


(36.0-47.0) 


 


 





 


Mean Corpuscular Volume 91 fL ()    


 


Mean Corpuscular Hemoglobin 29 pg (25-35)    


 


Mean Corpuscular Hemoglobin


Concent 32 g/dL


(31-37) 


 


 





 


Red Cell Distribution Width


 18.5 %


(11.5-14.5) 


 


 





 


Platelet Count


 370 x10^3/uL


(140-400) 


 


 





 


Neutrophils (%) (Auto) 75 % (31-73)    


 


Lymphocytes (%) (Auto) 17 % (24-48)    


 


Monocytes (%) (Auto) 5 % (0-9)    


 


Eosinophils (%) (Auto) 3 % (0-3)    


 


Basophils (%) (Auto) 1 % (0-3)    


 


Neutrophils # (Auto)


 5.5 x10^3/uL


(1.8-7.7) 


 


 





 


Lymphocytes # (Auto)


 1.2 x10^3/uL


(1.0-4.8) 


 


 





 


Monocytes # (Auto)


 0.4 x10^3/uL


(0.0-1.1) 


 


 





 


Eosinophils # (Auto)


 0.2 x10^3/uL


(0.0-0.7) 


 


 





 


Basophils # (Auto)


 0.0 x10^3/uL


(0.0-0.2) 


 


 





 


Sodium Level


 153 mmol/L


(136-145) 


 


 





 


Potassium Level


 4.1 mmol/L


(3.5-5.1) 


 


 





 


Chloride Level


 114 mmol/L


() 


 


 





 


Carbon Dioxide Level


 33 mmol/L


(21-32) 


 


 





 


Anion Gap 6 (6-14)    


 


Blood Urea Nitrogen


 41 mg/dL


(7-20) 


 


 





 


Creatinine


 0.9 mg/dL


(0.6-1.0) 


 


 





 


Estimated GFR


(Cockcroft-Gault) 66.5 


 


 


 





 


Glucose Level


 104 mg/dL


(70-99) 


 


 





 


Calcium Level


 8.6 mg/dL


(8.5-10.1) 


 


 





 


Phosphorus Level


 3.2 mg/dL


(2.6-4.7) 


 


 





 


Magnesium Level


 2.0 mg/dL


(1.8-2.4) 


 


 





 


Glucose (Fingerstick)


 


 102 mg/dL


(70-99) 


 











Medications





Active Scripts








 Medications  Dose


 Route/Sig


 Max Daily Dose Days Date Category


 


 Bisoprolol


 Fumarate 5 Mg


 Tablet  10 Mg


 PO DAILY


   3/16/20 Reported











Impression


.


IMPRESSION:


1.  Acute hypoxemic respiratory failure secondary to ARDS status post trach,


2.  Gallstone pancreatitis


3.  Severe metabolic acidosis.stable


4.  Acute kidney injury-stable, ON HD-- continue to improve 


5.  Acute gallstone pancreatitis.


6.  Hypoalbuminemia.


7.  Moderate persistent effusions


8.  Fever-  Per ID, per surgery


9.  Chronic anemia


10. Covid 19 testing negative


11. Moderate to large ascites-S/P paracentisis


12.S/P paracentisis with 4 liters removed on 4/15/20














Surgery note


Operative Note:


After obtaining informed consent, patient was taken to OR, induced under GETA 

and prepped in the usual fashion.  5 mm port placed umbilical and right mid 

abdomen, all under laparoscopic guidance.  Large amount of ascites encountered 

and aspirated off.  Fluid was clear with some whitish debris.  Viscera was 

completely locked in with obliteration of all planes, preventing any significant

exploration.  Copious irrigation.





Given patient's overall clinical improvement, favor against open procedure and 

attempt at cholecystectomy and/or necrosectomy, given high risk of 

complications.  Cholecystectomy will need to be performed, but favor waiting 3 

months.





19 ROBERT drain placed and secured with 3 0 nylon.  Skin repaired with 4 0 monocryl.

 Dressing placed.





Patient tolerated procedure well and sent to PACU in stable condition.  All 

counts correct.  Wound class is 4.





Plan


.


Continue off mechanical support


Trach shield


precedex for anxiety, prn, avoid excessive sedation


Follow surgery input


Follow ID rec, abx per id


Follow nephrology recs 


Nutritional support per surgery: continue TPN for nutrition 


DVT/GI PPX : heparin Sq/ protonix


 


d/w RN/RT





CODE:FULL











STEVE MIRANDA MD               May 5, 2020 08:25

## 2020-05-05 NOTE — NUR
Pharmacy TPN Dosing Note



S: JESENIA RICH is a 49 year old F Currently receiving Central Continuous TPN started 
03/18/20



B:Pertinent PMH: Necrotizing pancreatitis

Height: 5 feet, 8 inches

Weight: 106.160255 kg



Current diet: NPO 



LABS:

Sodium:    153 

Potassium: 4.1 

Chloride:  114 

Calcium:   8.6 

Corrected Calcium: 10.36 

Magnesium: 2 

CO2:       33 

SCr:       0.9 

Glucose:   102-153 

Albumin:   1.8 

AST:       21 

ALT:       11 



TPN FORMULA:

TPN TYPE:  Central Continuous

AMINO ACIDS:         90 gm

DEXTROSE:            225 gm

LIPIDS:              30 gm

POTASSIUM CHLORIDE:  75 mEq

MAGNESIUM:           15 mEq

CALCIUM:             8 mEq

INSULIN:             20 units

MULTIPLE VITAMIN:    10 ml

TRACE ELEMENTS:      0.5 ml(s)



TPN PLAN:  

Decrease insulin to 20 units in the TPN as BG was low this AM.





R: Change TPN as noted above.

Will monitor electrolytes, glucose, and tolerance to TPN.



 LORENZO LESLIE RUTHANN, 05/05/20 1147

## 2020-05-05 NOTE — PDOC
Subjective:


Subjective:


Abdomen hurts.  Nausea w/o vomiting.





Objective:


Vital Signs:





                                   Vital Signs








  Date Time  Temp Pulse Resp B/P (MAP) Pulse Ox O2 Delivery O2 Flow Rate FiO2


 


5/5/20 09:11   35  95 Tracheal Collar 10.0 


 


5/5/20 06:00  91  94/56 (69)    


 


5/5/20 04:00 97.5       





 97.5       








Labs:





Laboratory Tests








Test


 5/4/20


11:58 5/4/20


18:17 5/5/20


00:21 5/5/20


06:38


 


Glucose (Fingerstick)


 182 mg/dL


(70-99) 145 mg/dL


(70-99) 153 mg/dL


(70-99) 102 mg/dL


(70-99)








Imaging:


Abd US


IMPRESSION: Complex fluid collections within the right and lower quadrants. The 

abdominal visceral and vascular structures are not formally assessed on this ex

am.


 


CT A/P


IMPRESSION:


1. Findings consistent with sequelae of severe acute pancreatitis with enlarging

peripancreatic and intra-abdominal fluid collections as described


2. Interval placement of a drain in the ventral abdominal wall with and partial 

evacuation of the ventral extraperitoneal fluid collection previously evident.





PE:





GEN: NAD - nurses present adjusting pt in bed


LUNGS: trach/vent


HEART: tachycardic


ABD: distended, uncomfortable, quiet, ROBERT w/ minimal serous fluid


NEURO/PSYCH: A & O 3





A/P:


Gallstone pancreatitis, MOSF


Nausea, abd discomfort





--


Will review interval CT w/ Dr. Bhatia.





Hemodynamically unstable?:  No


Is patient in severe pain?:  Yes


Is NPO status required?:  Yes











RAGHAVENDRA MUCRIA          May 5, 2020 09:32

## 2020-05-05 NOTE — NUR
SS following up with discharge planning. SS discussed with pt RN. Pt is on trach shield and 
TPN. Pt not able to tolerate speaking valve. Pt will need to be able to tolerate speaking 
valve prior to assessing for PO diet. Per RN, pt stood today with the walker and took a 
couple of steps. Pt's daughters have appt with [facility] on 5/7/2020 at 1500 to complete 
disability packet. SS will continue to follow for discharge planning.

## 2020-05-05 NOTE — PDOC
SURGICAL PROGRESS NOTE


Subjective


Pt awake on vent


Vital Signs





Vital Signs








  Date Time  Temp Pulse Resp B/P (MAP) Pulse Ox O2 Delivery O2 Flow Rate FiO2


 


5/5/20 07:46     97 Ventilator  


 


5/5/20 06:00  91 18 94/56 (69)    


 


5/5/20 04:00 97.5       





 97.5       


 


5/4/20 23:00       8.0 








I&O











Intake and Output 


 


 5/5/20





 07:00


 


Intake Total 3052 ml


 


Output Total 1940 ml


 


Balance 1112 ml


 


 


 


Intake Oral 0 ml


 


IV Total 3052 ml


 


Output Urine Total 1900 ml


 


Drainage Total 40 ml








General:  Alert, mild distress


Abdomen:  Soft, Other (mild TTP, drain with serous output)


Labs





Laboratory Tests








Test


 5/3/20


12:47 5/3/20


16:09 5/3/20


17:57 5/4/20


06:10


 


Glucose (Fingerstick)


 126 mg/dL


(70-99) 


 148 mg/dL


(70-99) 





 


O2 Saturation  93 % (92-99)   


 


Arterial Blood pH


 


 7.47


(7.35-7.45) 


 





 


Arterial Blood pCO2 at


Patient Temp 


 49 mmHg


(35-46) 


 





 


Arterial Blood pO2 at Patient


Temp 


 73 mmHg


() 


 





 


Arterial Blood HCO3


 


 35 mmol/L


(21-28) 


 





 


Arterial Blood Base Excess


 


 10 mmol/L


(-3-3) 


 





 


Oxyhemoglobin  92.7 %   


 


Methemoglobin


 


 0.4 %


(0.0-1.9) 


 





 


Carbon Monoxide, Quantitative


 


 0.3 %


(0.0-1.9) 


 





 


FiO2  30   


 


Sodium Level


 


 


 


 154 mmol/L


(136-145)


 


Potassium Level


 


 


 


 4.0 mmol/L


(3.5-5.1)


 


Chloride Level


 


 


 


 114 mmol/L


()


 


Carbon Dioxide Level


 


 


 


 33 mmol/L


(21-32)


 


Anion Gap    7 (6-14) 


 


Blood Urea Nitrogen


 


 


 


 45 mg/dL


(7-20)


 


Creatinine


 


 


 


 0.9 mg/dL


(0.6-1.0)


 


Estimated GFR


(Cockcroft-Gault) 


 


 


 66.5 





 


BUN/Creatinine Ratio    50 (6-20) 


 


Glucose Level


 


 


 


 164 mg/dL


(70-99)


 


Calcium Level


 


 


 


 8.8 mg/dL


(8.5-10.1)


 


Phosphorus Level


 


 


 


 3.4 mg/dL


(2.6-4.7)


 


Magnesium Level


 


 


 


 2.2 mg/dL


(1.8-2.4)


 


Total Bilirubin


 


 


 


 0.5 mg/dL


(0.2-1.0)


 


Aspartate Amino Transf


(AST/SGOT) 


 


 


 39 U/L (15-37) 





 


Alanine Aminotransferase


(ALT/SGPT) 


 


 


 36 U/L (14-59) 





 


Alkaline Phosphatase


 


 


 


 127 U/L


()


 


Total Protein


 


 


 


 5.6 g/dL


(6.4-8.2)


 


Albumin


 


 


 


 1.8 g/dL


(3.4-5.0)


 


Albumin/Globulin Ratio    0.5 (1.0-1.7) 


 


Triglycerides Level


 


 


 


 174 mg/dL


(0-150)


 


Test


 5/4/20


06:15 5/4/20


10:30 5/4/20


11:58 5/4/20


18:17


 


Glucose (Fingerstick)


 153 mg/dL


(70-99) 


 182 mg/dL


(70-99) 145 mg/dL


(70-99)


 


White Blood Count


 


 9.9 x10^3/uL


(4.0-11.0) 


 





 


Red Blood Count


 


 2.49 x10^6/uL


(3.50-5.40) 


 





 


Hemoglobin


 


 7.1 g/dL


(12.0-15.5) 


 





 


Hematocrit


 


 22.7 %


(36.0-47.0) 


 





 


Mean Corpuscular Volume  91 fL ()   


 


Mean Corpuscular Hemoglobin  29 pg (25-35)   


 


Mean Corpuscular Hemoglobin


Concent 


 31 g/dL


(31-37) 


 





 


Red Cell Distribution Width


 


 18.9 %


(11.5-14.5) 


 





 


Platelet Count


 


 458 x10^3/uL


(140-400) 


 





 


Neutrophils (%) (Auto)  74 % (31-73)   


 


Lymphocytes (%) (Auto)  19 % (24-48)   


 


Monocytes (%) (Auto)  5 % (0-9)   


 


Eosinophils (%) (Auto)  2 % (0-3)   


 


Basophils (%) (Auto)  0 % (0-3)   


 


Neutrophils # (Auto)


 


 7.3 x10^3/uL


(1.8-7.7) 


 





 


Lymphocytes # (Auto)


 


 1.9 x10^3/uL


(1.0-4.8) 


 





 


Monocytes # (Auto)


 


 0.5 x10^3/uL


(0.0-1.1) 


 





 


Eosinophils # (Auto)


 


 0.1 x10^3/uL


(0.0-0.7) 


 





 


Basophils # (Auto)


 


 0.0 x10^3/uL


(0.0-0.2) 


 





 


Test


 5/5/20


00:21 5/5/20


06:35 5/5/20


06:38 





 


Glucose (Fingerstick)


 153 mg/dL


(70-99) 


 102 mg/dL


(70-99) 





 


White Blood Count


 


 7.3 x10^3/uL


(4.0-11.0) 


 





 


Red Blood Count


 


 2.55 x10^6/uL


(3.50-5.40) 


 





 


Hemoglobin


 


 7.3 g/dL


(12.0-15.5) 


 





 


Hematocrit


 


 23.2 %


(36.0-47.0) 


 





 


Mean Corpuscular Volume  91 fL ()   


 


Mean Corpuscular Hemoglobin  29 pg (25-35)   


 


Mean Corpuscular Hemoglobin


Concent 


 32 g/dL


(31-37) 


 





 


Red Cell Distribution Width


 


 18.5 %


(11.5-14.5) 


 





 


Platelet Count


 


 370 x10^3/uL


(140-400) 


 





 


Neutrophils (%) (Auto)  75 % (31-73)   


 


Lymphocytes (%) (Auto)  17 % (24-48)   


 


Monocytes (%) (Auto)  5 % (0-9)   


 


Eosinophils (%) (Auto)  3 % (0-3)   


 


Basophils (%) (Auto)  1 % (0-3)   


 


Neutrophils # (Auto)


 


 5.5 x10^3/uL


(1.8-7.7) 


 





 


Lymphocytes # (Auto)


 


 1.2 x10^3/uL


(1.0-4.8) 


 





 


Monocytes # (Auto)


 


 0.4 x10^3/uL


(0.0-1.1) 


 





 


Eosinophils # (Auto)


 


 0.2 x10^3/uL


(0.0-0.7) 


 





 


Basophils # (Auto)


 


 0.0 x10^3/uL


(0.0-0.2) 


 





 


Sodium Level


 


 153 mmol/L


(136-145) 


 





 


Potassium Level


 


 4.1 mmol/L


(3.5-5.1) 


 





 


Chloride Level


 


 114 mmol/L


() 


 





 


Carbon Dioxide Level


 


 33 mmol/L


(21-32) 


 





 


Anion Gap  6 (6-14)   


 


Blood Urea Nitrogen


 


 41 mg/dL


(7-20) 


 





 


Creatinine


 


 0.9 mg/dL


(0.6-1.0) 


 





 


Estimated GFR


(Cockcroft-Gault) 


 66.5 


 


 





 


Glucose Level


 


 104 mg/dL


(70-99) 


 





 


Calcium Level


 


 8.6 mg/dL


(8.5-10.1) 


 





 


Phosphorus Level


 


 3.2 mg/dL


(2.6-4.7) 


 





 


Magnesium Level


 


 2.0 mg/dL


(1.8-2.4) 


 











Laboratory Tests








Test


 5/4/20


10:30 5/4/20


11:58 5/4/20


18:17 5/5/20


00:21


 


White Blood Count


 9.9 x10^3/uL


(4.0-11.0) 


 


 





 


Red Blood Count


 2.49 x10^6/uL


(3.50-5.40) 


 


 





 


Hemoglobin


 7.1 g/dL


(12.0-15.5) 


 


 





 


Hematocrit


 22.7 %


(36.0-47.0) 


 


 





 


Mean Corpuscular Volume 91 fL ()    


 


Mean Corpuscular Hemoglobin 29 pg (25-35)    


 


Mean Corpuscular Hemoglobin


Concent 31 g/dL


(31-37) 


 


 





 


Red Cell Distribution Width


 18.9 %


(11.5-14.5) 


 


 





 


Platelet Count


 458 x10^3/uL


(140-400) 


 


 





 


Neutrophils (%) (Auto) 74 % (31-73)    


 


Lymphocytes (%) (Auto) 19 % (24-48)    


 


Monocytes (%) (Auto) 5 % (0-9)    


 


Eosinophils (%) (Auto) 2 % (0-3)    


 


Basophils (%) (Auto) 0 % (0-3)    


 


Neutrophils # (Auto)


 7.3 x10^3/uL


(1.8-7.7) 


 


 





 


Lymphocytes # (Auto)


 1.9 x10^3/uL


(1.0-4.8) 


 


 





 


Monocytes # (Auto)


 0.5 x10^3/uL


(0.0-1.1) 


 


 





 


Eosinophils # (Auto)


 0.1 x10^3/uL


(0.0-0.7) 


 


 





 


Basophils # (Auto)


 0.0 x10^3/uL


(0.0-0.2) 


 


 





 


Glucose (Fingerstick)


 


 182 mg/dL


(70-99) 145 mg/dL


(70-99) 153 mg/dL


(70-99)


 


Test


 5/5/20


06:35 5/5/20


06:38 


 





 


White Blood Count


 7.3 x10^3/uL


(4.0-11.0) 


 


 





 


Red Blood Count


 2.55 x10^6/uL


(3.50-5.40) 


 


 





 


Hemoglobin


 7.3 g/dL


(12.0-15.5) 


 


 





 


Hematocrit


 23.2 %


(36.0-47.0) 


 


 





 


Mean Corpuscular Volume 91 fL ()    


 


Mean Corpuscular Hemoglobin 29 pg (25-35)    


 


Mean Corpuscular Hemoglobin


Concent 32 g/dL


(31-37) 


 


 





 


Red Cell Distribution Width


 18.5 %


(11.5-14.5) 


 


 





 


Platelet Count


 370 x10^3/uL


(140-400) 


 


 





 


Neutrophils (%) (Auto) 75 % (31-73)    


 


Lymphocytes (%) (Auto) 17 % (24-48)    


 


Monocytes (%) (Auto) 5 % (0-9)    


 


Eosinophils (%) (Auto) 3 % (0-3)    


 


Basophils (%) (Auto) 1 % (0-3)    


 


Neutrophils # (Auto)


 5.5 x10^3/uL


(1.8-7.7) 


 


 





 


Lymphocytes # (Auto)


 1.2 x10^3/uL


(1.0-4.8) 


 


 





 


Monocytes # (Auto)


 0.4 x10^3/uL


(0.0-1.1) 


 


 





 


Eosinophils # (Auto)


 0.2 x10^3/uL


(0.0-0.7) 


 


 





 


Basophils # (Auto)


 0.0 x10^3/uL


(0.0-0.2) 


 


 





 


Sodium Level


 153 mmol/L


(136-145) 


 


 





 


Potassium Level


 4.1 mmol/L


(3.5-5.1) 


 


 





 


Chloride Level


 114 mmol/L


() 


 


 





 


Carbon Dioxide Level


 33 mmol/L


(21-32) 


 


 





 


Anion Gap 6 (6-14)    


 


Blood Urea Nitrogen


 41 mg/dL


(7-20) 


 


 





 


Creatinine


 0.9 mg/dL


(0.6-1.0) 


 


 





 


Estimated GFR


(Cockcroft-Gault) 66.5 


 


 


 





 


Glucose Level


 104 mg/dL


(70-99) 


 


 





 


Calcium Level


 8.6 mg/dL


(8.5-10.1) 


 


 





 


Phosphorus Level


 3.2 mg/dL


(2.6-4.7) 


 


 





 


Magnesium Level


 2.0 mg/dL


(1.8-2.4) 


 


 





 


Glucose (Fingerstick)


 


 102 mg/dL


(70-99) 


 











I have reviewed the following


CT with severe pancreatitis


Problem List


Problems


Medical Problems:


(1) Acute pancreatitis


Status: Acute  





(2) Cholelithiasis


Status: Acute  








Assessment/Plan


pancreatitis


cont supportive care, no surgical plans.











GAMAL ZHOU MD              May 5, 2020 09:12

## 2020-05-06 VITALS — DIASTOLIC BLOOD PRESSURE: 80 MMHG | SYSTOLIC BLOOD PRESSURE: 160 MMHG

## 2020-05-06 VITALS — SYSTOLIC BLOOD PRESSURE: 87 MMHG | DIASTOLIC BLOOD PRESSURE: 52 MMHG

## 2020-05-06 VITALS — SYSTOLIC BLOOD PRESSURE: 162 MMHG | DIASTOLIC BLOOD PRESSURE: 71 MMHG

## 2020-05-06 VITALS — SYSTOLIC BLOOD PRESSURE: 119 MMHG | DIASTOLIC BLOOD PRESSURE: 61 MMHG

## 2020-05-06 VITALS — DIASTOLIC BLOOD PRESSURE: 65 MMHG | SYSTOLIC BLOOD PRESSURE: 90 MMHG

## 2020-05-06 VITALS — DIASTOLIC BLOOD PRESSURE: 80 MMHG | SYSTOLIC BLOOD PRESSURE: 158 MMHG

## 2020-05-06 VITALS — SYSTOLIC BLOOD PRESSURE: 160 MMHG | DIASTOLIC BLOOD PRESSURE: 80 MMHG

## 2020-05-06 VITALS — SYSTOLIC BLOOD PRESSURE: 121 MMHG | DIASTOLIC BLOOD PRESSURE: 67 MMHG

## 2020-05-06 VITALS — SYSTOLIC BLOOD PRESSURE: 105 MMHG | DIASTOLIC BLOOD PRESSURE: 61 MMHG

## 2020-05-06 VITALS — SYSTOLIC BLOOD PRESSURE: 158 MMHG | DIASTOLIC BLOOD PRESSURE: 92 MMHG

## 2020-05-06 VITALS — DIASTOLIC BLOOD PRESSURE: 77 MMHG | SYSTOLIC BLOOD PRESSURE: 140 MMHG

## 2020-05-06 VITALS — SYSTOLIC BLOOD PRESSURE: 110 MMHG | DIASTOLIC BLOOD PRESSURE: 65 MMHG

## 2020-05-06 VITALS — DIASTOLIC BLOOD PRESSURE: 61 MMHG | SYSTOLIC BLOOD PRESSURE: 115 MMHG

## 2020-05-06 VITALS — SYSTOLIC BLOOD PRESSURE: 117 MMHG | DIASTOLIC BLOOD PRESSURE: 64 MMHG

## 2020-05-06 VITALS — SYSTOLIC BLOOD PRESSURE: 119 MMHG | DIASTOLIC BLOOD PRESSURE: 59 MMHG

## 2020-05-06 VITALS — SYSTOLIC BLOOD PRESSURE: 128 MMHG | DIASTOLIC BLOOD PRESSURE: 76 MMHG

## 2020-05-06 VITALS — DIASTOLIC BLOOD PRESSURE: 65 MMHG | SYSTOLIC BLOOD PRESSURE: 128 MMHG

## 2020-05-06 VITALS — DIASTOLIC BLOOD PRESSURE: 64 MMHG | SYSTOLIC BLOOD PRESSURE: 112 MMHG

## 2020-05-06 VITALS — SYSTOLIC BLOOD PRESSURE: 131 MMHG | DIASTOLIC BLOOD PRESSURE: 59 MMHG

## 2020-05-06 VITALS — DIASTOLIC BLOOD PRESSURE: 59 MMHG | SYSTOLIC BLOOD PRESSURE: 102 MMHG

## 2020-05-06 VITALS — SYSTOLIC BLOOD PRESSURE: 112 MMHG | DIASTOLIC BLOOD PRESSURE: 58 MMHG

## 2020-05-06 VITALS — SYSTOLIC BLOOD PRESSURE: 84 MMHG | DIASTOLIC BLOOD PRESSURE: 48 MMHG

## 2020-05-06 VITALS — SYSTOLIC BLOOD PRESSURE: 109 MMHG | DIASTOLIC BLOOD PRESSURE: 52 MMHG

## 2020-05-06 VITALS — DIASTOLIC BLOOD PRESSURE: 92 MMHG | SYSTOLIC BLOOD PRESSURE: 174 MMHG

## 2020-05-06 VITALS — SYSTOLIC BLOOD PRESSURE: 115 MMHG | DIASTOLIC BLOOD PRESSURE: 60 MMHG

## 2020-05-06 VITALS — SYSTOLIC BLOOD PRESSURE: 96 MMHG | DIASTOLIC BLOOD PRESSURE: 55 MMHG

## 2020-05-06 LAB
ALBUMIN SERPL-MCNC: 1.8 G/DL (ref 3.4–5)
ALBUMIN/GLOB SERPL: 0.5 {RATIO} (ref 1–1.7)
ALP SERPL-CCNC: 84 U/L (ref 46–116)
ALT SERPL-CCNC: 30 U/L (ref 14–59)
ANION GAP SERPL CALC-SCNC: 8 MMOL/L (ref 6–14)
AST SERPL-CCNC: 35 U/L (ref 15–37)
BASOPHILS # BLD AUTO: 0 X10^3/UL (ref 0–0.2)
BASOPHILS NFR BLD: 0 % (ref 0–3)
BILIRUB SERPL-MCNC: 0.4 MG/DL (ref 0.2–1)
BUN SERPL-MCNC: 38 MG/DL (ref 7–20)
BUN/CREAT SERPL: 42 (ref 6–20)
CALCIUM SERPL-MCNC: 9 MG/DL (ref 8.5–10.1)
CHLORIDE SERPL-SCNC: 113 MMOL/L (ref 98–107)
CO2 SERPL-SCNC: 31 MMOL/L (ref 21–32)
CREAT SERPL-MCNC: 0.9 MG/DL (ref 0.6–1)
EOSINOPHIL NFR BLD: 0.2 X10^3/UL (ref 0–0.7)
EOSINOPHIL NFR BLD: 3 % (ref 0–3)
ERYTHROCYTE [DISTWIDTH] IN BLOOD BY AUTOMATED COUNT: 18.8 % (ref 11.5–14.5)
GFR SERPLBLD BASED ON 1.73 SQ M-ARVRAT: 66.5 ML/MIN
GLOBULIN SER-MCNC: 3.8 G/DL (ref 2.2–3.8)
GLUCOSE SERPL-MCNC: 137 MG/DL (ref 70–99)
HCT VFR BLD CALC: 20.1 % (ref 36–47)
HCT VFR BLD CALC: 20.9 % (ref 36–47)
HCT VFR BLD CALC: 24.1 % (ref 36–47)
HGB BLD-MCNC: 6.4 G/DL (ref 12–15.5)
HGB BLD-MCNC: 6.8 G/DL (ref 12–15.5)
HGB BLD-MCNC: 7.8 G/DL (ref 12–15.5)
LYMPHOCYTES # BLD: 1.9 X10^3/UL (ref 1–4.8)
LYMPHOCYTES NFR BLD AUTO: 21 % (ref 24–48)
MCH RBC QN AUTO: 29 PG (ref 25–35)
MCHC RBC AUTO-ENTMCNC: 32 G/DL (ref 31–37)
MCV RBC AUTO: 91 FL (ref 79–100)
MONO #: 0.5 X10^3/UL (ref 0–1.1)
MONOCYTES NFR BLD: 6 % (ref 0–9)
NEUT #: 6.1 X10^3/UL (ref 1.8–7.7)
NEUTROPHILS NFR BLD AUTO: 70 % (ref 31–73)
PLATELET # BLD AUTO: 416 X10^3/UL (ref 140–400)
POTASSIUM SERPL-SCNC: 4.3 MMOL/L (ref 3.5–5.1)
PROT SERPL-MCNC: 5.6 G/DL (ref 6.4–8.2)
RBC # BLD AUTO: 2.3 X10^6/UL (ref 3.5–5.4)
SODIUM SERPL-SCNC: 152 MMOL/L (ref 136–145)
WBC # BLD AUTO: 8.8 X10^3/UL (ref 4–11)

## 2020-05-06 RX ADMIN — DEXMEDETOMIDINE HYDROCHLORIDE PRN MLS/HR: 100 INJECTION, SOLUTION, CONCENTRATE INTRAVENOUS at 13:13

## 2020-05-06 RX ADMIN — DEXTROSE SCH MLS/HR: 50 INJECTION, SOLUTION INTRAVENOUS at 10:44

## 2020-05-06 RX ADMIN — HYDROMORPHONE HYDROCHLORIDE PRN MG: 2 INJECTION INTRAMUSCULAR; INTRAVENOUS; SUBCUTANEOUS at 00:03

## 2020-05-06 RX ADMIN — INSULIN LISPRO SCH UNITS: 100 INJECTION, SOLUTION INTRAVENOUS; SUBCUTANEOUS at 00:00

## 2020-05-06 RX ADMIN — DEXMEDETOMIDINE HYDROCHLORIDE PRN MLS/HR: 100 INJECTION, SOLUTION, CONCENTRATE INTRAVENOUS at 22:16

## 2020-05-06 RX ADMIN — DEXMEDETOMIDINE HYDROCHLORIDE PRN MLS/HR: 100 INJECTION, SOLUTION, CONCENTRATE INTRAVENOUS at 06:03

## 2020-05-06 RX ADMIN — MEROPENEM SCH MLS/HR: 1 INJECTION, POWDER, FOR SOLUTION INTRAVENOUS at 20:58

## 2020-05-06 RX ADMIN — INSULIN LISPRO SCH UNITS: 100 INJECTION, SOLUTION INTRAVENOUS; SUBCUTANEOUS at 17:31

## 2020-05-06 RX ADMIN — HYDROMORPHONE HYDROCHLORIDE PRN MG: 2 INJECTION INTRAMUSCULAR; INTRAVENOUS; SUBCUTANEOUS at 06:53

## 2020-05-06 RX ADMIN — DAPTOMYCIN SCH MLS/HR: 500 INJECTION, POWDER, LYOPHILIZED, FOR SOLUTION INTRAVENOUS at 09:51

## 2020-05-06 RX ADMIN — MEROPENEM SCH MLS/HR: 1 INJECTION, POWDER, FOR SOLUTION INTRAVENOUS at 06:04

## 2020-05-06 RX ADMIN — HEPARIN SODIUM SCH UNIT: 5000 INJECTION, SOLUTION INTRAVENOUS; SUBCUTANEOUS at 20:57

## 2020-05-06 RX ADMIN — HEPARIN SODIUM SCH UNIT: 5000 INJECTION, SOLUTION INTRAVENOUS; SUBCUTANEOUS at 09:00

## 2020-05-06 RX ADMIN — HYDROMORPHONE HYDROCHLORIDE PRN MG: 2 INJECTION INTRAMUSCULAR; INTRAVENOUS; SUBCUTANEOUS at 19:40

## 2020-05-06 RX ADMIN — INSULIN LISPRO SCH UNITS: 100 INJECTION, SOLUTION INTRAVENOUS; SUBCUTANEOUS at 13:12

## 2020-05-06 RX ADMIN — HYDROMORPHONE HYDROCHLORIDE PRN MG: 2 INJECTION INTRAMUSCULAR; INTRAVENOUS; SUBCUTANEOUS at 13:14

## 2020-05-06 RX ADMIN — DEXMEDETOMIDINE HYDROCHLORIDE PRN MLS/HR: 100 INJECTION, SOLUTION, CONCENTRATE INTRAVENOUS at 17:31

## 2020-05-06 RX ADMIN — BACITRACIN SCH MLS/HR: 5000 INJECTION, POWDER, FOR SOLUTION INTRAMUSCULAR at 13:59

## 2020-05-06 RX ADMIN — Medication PRN EACH: at 13:45

## 2020-05-06 RX ADMIN — PANTOPRAZOLE SODIUM SCH MG: 40 INJECTION, POWDER, FOR SOLUTION INTRAVENOUS at 08:36

## 2020-05-06 RX ADMIN — DEXMEDETOMIDINE HYDROCHLORIDE PRN MLS/HR: 100 INJECTION, SOLUTION, CONCENTRATE INTRAVENOUS at 08:35

## 2020-05-06 RX ADMIN — MEROPENEM SCH MLS/HR: 1 INJECTION, POWDER, FOR SOLUTION INTRAVENOUS at 13:55

## 2020-05-06 RX ADMIN — INSULIN LISPRO SCH UNITS: 100 INJECTION, SOLUTION INTRAVENOUS; SUBCUTANEOUS at 06:00

## 2020-05-06 NOTE — PDOC
SURGICAL PROGRESS NOTE


Subjective


Pt sleeping comfortably on vent


Vital Signs





Vital Signs








  Date Time  Temp Pulse Resp B/P (MAP) Pulse Ox O2 Delivery O2 Flow Rate FiO2


 


5/6/20 12:05 98.8 112 36 162/71    





 98.8       


 


5/6/20 12:00     99 Tracheal Collar 8.0 








I&O











Intake and Output 


 


 5/6/20





 07:00


 


Intake Total 2812 ml


 


Output Total 2180 ml


 


Balance 632 ml


 


 


 


Intake Oral 0 ml


 


IV Total 2812 ml


 


Output Urine Total 2110 ml


 


Drainage Total 70 ml








PATIENT HAS A VILLASENOR:  Yes


General:  No acute distress


Labs





Laboratory Tests








Test


 5/4/20


18:17 5/5/20


00:21 5/5/20


06:35 5/5/20


06:38


 


Glucose (Fingerstick)


 145 mg/dL


(70-99) 153 mg/dL


(70-99) 


 102 mg/dL


(70-99)


 


White Blood Count


 


 


 7.3 x10^3/uL


(4.0-11.0) 





 


Red Blood Count


 


 


 2.55 x10^6/uL


(3.50-5.40) 





 


Hemoglobin


 


 


 7.3 g/dL


(12.0-15.5) 





 


Hematocrit


 


 


 23.2 %


(36.0-47.0) 





 


Mean Corpuscular Volume   91 fL ()  


 


Mean Corpuscular Hemoglobin   29 pg (25-35)  


 


Mean Corpuscular Hemoglobin


Concent 


 


 32 g/dL


(31-37) 





 


Red Cell Distribution Width


 


 


 18.5 %


(11.5-14.5) 





 


Platelet Count


 


 


 370 x10^3/uL


(140-400) 





 


Neutrophils (%) (Auto)   75 % (31-73)  


 


Lymphocytes (%) (Auto)   17 % (24-48)  


 


Monocytes (%) (Auto)   5 % (0-9)  


 


Eosinophils (%) (Auto)   3 % (0-3)  


 


Basophils (%) (Auto)   1 % (0-3)  


 


Neutrophils # (Auto)


 


 


 5.5 x10^3/uL


(1.8-7.7) 





 


Lymphocytes # (Auto)


 


 


 1.2 x10^3/uL


(1.0-4.8) 





 


Monocytes # (Auto)


 


 


 0.4 x10^3/uL


(0.0-1.1) 





 


Eosinophils # (Auto)


 


 


 0.2 x10^3/uL


(0.0-0.7) 





 


Basophils # (Auto)


 


 


 0.0 x10^3/uL


(0.0-0.2) 





 


Sodium Level


 


 


 153 mmol/L


(136-145) 





 


Potassium Level


 


 


 4.1 mmol/L


(3.5-5.1) 





 


Chloride Level


 


 


 114 mmol/L


() 





 


Carbon Dioxide Level


 


 


 33 mmol/L


(21-32) 





 


Anion Gap   6 (6-14)  


 


Blood Urea Nitrogen


 


 


 41 mg/dL


(7-20) 





 


Creatinine


 


 


 0.9 mg/dL


(0.6-1.0) 





 


Estimated GFR


(Cockcroft-Gault) 


 


 66.5 


 





 


Glucose Level


 


 


 104 mg/dL


(70-99) 





 


Calcium Level


 


 


 8.6 mg/dL


(8.5-10.1) 





 


Phosphorus Level


 


 


 3.2 mg/dL


(2.6-4.7) 





 


Magnesium Level


 


 


 2.0 mg/dL


(1.8-2.4) 





 


Test


 5/5/20


12:18 5/5/20


18:13 5/6/20


00:09 5/6/20


06:05


 


Glucose (Fingerstick)


 147 mg/dL


(70-99) 143 mg/dL


(70-99) 151 mg/dL


(70-99) 





 


White Blood Count


 


 


 


 8.8 x10^3/uL


(4.0-11.0)


 


Red Blood Count


 


 


 


 2.30 x10^6/uL


(3.50-5.40)


 


Hemoglobin


 


 


 


 6.8 g/dL


(12.0-15.5)


 


Hematocrit


 


 


 


 20.9 %


(36.0-47.0)


 


Mean Corpuscular Volume    91 fL () 


 


Mean Corpuscular Hemoglobin    29 pg (25-35) 


 


Mean Corpuscular Hemoglobin


Concent 


 


 


 32 g/dL


(31-37)


 


Red Cell Distribution Width


 


 


 


 18.8 %


(11.5-14.5)


 


Platelet Count


 


 


 


 416 x10^3/uL


(140-400)


 


Neutrophils (%) (Auto)    70 % (31-73) 


 


Lymphocytes (%) (Auto)    21 % (24-48) 


 


Monocytes (%) (Auto)    6 % (0-9) 


 


Eosinophils (%) (Auto)    3 % (0-3) 


 


Basophils (%) (Auto)    0 % (0-3) 


 


Neutrophils # (Auto)


 


 


 


 6.1 x10^3/uL


(1.8-7.7)


 


Lymphocytes # (Auto)


 


 


 


 1.9 x10^3/uL


(1.0-4.8)


 


Monocytes # (Auto)


 


 


 


 0.5 x10^3/uL


(0.0-1.1)


 


Eosinophils # (Auto)


 


 


 


 0.2 x10^3/uL


(0.0-0.7)


 


Basophils # (Auto)


 


 


 


 0.0 x10^3/uL


(0.0-0.2)


 


Sodium Level


 


 


 


 152 mmol/L


(136-145)


 


Potassium Level


 


 


 


 4.3 mmol/L


(3.5-5.1)


 


Chloride Level


 


 


 


 113 mmol/L


()


 


Carbon Dioxide Level


 


 


 


 31 mmol/L


(21-32)


 


Anion Gap    8 (6-14) 


 


Blood Urea Nitrogen


 


 


 


 38 mg/dL


(7-20)


 


Creatinine


 


 


 


 0.9 mg/dL


(0.6-1.0)


 


Estimated GFR


(Cockcroft-Gault) 


 


 


 66.5 





 


BUN/Creatinine Ratio    42 (6-20) 


 


Glucose Level


 


 


 


 137 mg/dL


(70-99)


 


Calcium Level


 


 


 


 9.0 mg/dL


(8.5-10.1)


 


Total Bilirubin


 


 


 


 0.4 mg/dL


(0.2-1.0)


 


Aspartate Amino Transf


(AST/SGOT) 


 


 


 35 U/L (15-37) 





 


Alanine Aminotransferase


(ALT/SGPT) 


 


 


 30 U/L (14-59) 





 


Alkaline Phosphatase


 


 


 


 84 U/L


()


 


Total Protein


 


 


 


 5.6 g/dL


(6.4-8.2)


 


Albumin


 


 


 


 1.8 g/dL


(3.4-5.0)


 


Albumin/Globulin Ratio    0.5 (1.0-1.7) 


 


Test


 5/6/20


06:09 5/6/20


08:20 


 





 


Glucose (Fingerstick)


 128 mg/dL


(70-99) 


 


 





 


Hemoglobin


 


 6.4 g/dL


(12.0-15.5) 


 





 


Hematocrit


 


 20.1 %


(36.0-47.0) 


 





 


Mean Corpuscular Hemoglobin


Concent 


 32 g/dL


(31-37) 


 











Laboratory Tests








Test


 5/5/20


12:18 5/5/20


18:13 5/6/20


00:09 5/6/20


06:05


 


Glucose (Fingerstick)


 147 mg/dL


(70-99) 143 mg/dL


(70-99) 151 mg/dL


(70-99) 





 


White Blood Count


 


 


 


 8.8 x10^3/uL


(4.0-11.0)


 


Red Blood Count


 


 


 


 2.30 x10^6/uL


(3.50-5.40)


 


Hemoglobin


 


 


 


 6.8 g/dL


(12.0-15.5)


 


Hematocrit


 


 


 


 20.9 %


(36.0-47.0)


 


Mean Corpuscular Volume    91 fL () 


 


Mean Corpuscular Hemoglobin    29 pg (25-35) 


 


Mean Corpuscular Hemoglobin


Concent 


 


 


 32 g/dL


(31-37)


 


Red Cell Distribution Width


 


 


 


 18.8 %


(11.5-14.5)


 


Platelet Count


 


 


 


 416 x10^3/uL


(140-400)


 


Neutrophils (%) (Auto)    70 % (31-73) 


 


Lymphocytes (%) (Auto)    21 % (24-48) 


 


Monocytes (%) (Auto)    6 % (0-9) 


 


Eosinophils (%) (Auto)    3 % (0-3) 


 


Basophils (%) (Auto)    0 % (0-3) 


 


Neutrophils # (Auto)


 


 


 


 6.1 x10^3/uL


(1.8-7.7)


 


Lymphocytes # (Auto)


 


 


 


 1.9 x10^3/uL


(1.0-4.8)


 


Monocytes # (Auto)


 


 


 


 0.5 x10^3/uL


(0.0-1.1)


 


Eosinophils # (Auto)


 


 


 


 0.2 x10^3/uL


(0.0-0.7)


 


Basophils # (Auto)


 


 


 


 0.0 x10^3/uL


(0.0-0.2)


 


Sodium Level


 


 


 


 152 mmol/L


(136-145)


 


Potassium Level


 


 


 


 4.3 mmol/L


(3.5-5.1)


 


Chloride Level


 


 


 


 113 mmol/L


()


 


Carbon Dioxide Level


 


 


 


 31 mmol/L


(21-32)


 


Anion Gap    8 (6-14) 


 


Blood Urea Nitrogen


 


 


 


 38 mg/dL


(7-20)


 


Creatinine


 


 


 


 0.9 mg/dL


(0.6-1.0)


 


Estimated GFR


(Cockcroft-Gault) 


 


 


 66.5 





 


BUN/Creatinine Ratio    42 (6-20) 


 


Glucose Level


 


 


 


 137 mg/dL


(70-99)


 


Calcium Level


 


 


 


 9.0 mg/dL


(8.5-10.1)


 


Total Bilirubin


 


 


 


 0.4 mg/dL


(0.2-1.0)


 


Aspartate Amino Transf


(AST/SGOT) 


 


 


 35 U/L (15-37) 





 


Alanine Aminotransferase


(ALT/SGPT) 


 


 


 30 U/L (14-59) 





 


Alkaline Phosphatase


 


 


 


 84 U/L


()


 


Total Protein


 


 


 


 5.6 g/dL


(6.4-8.2)


 


Albumin


 


 


 


 1.8 g/dL


(3.4-5.0)


 


Albumin/Globulin Ratio    0.5 (1.0-1.7) 


 


Test


 5/6/20


06:09 5/6/20


08:20 


 





 


Glucose (Fingerstick)


 128 mg/dL


(70-99) 


 


 





 


Hemoglobin


 


 6.4 g/dL


(12.0-15.5) 


 





 


Hematocrit


 


 20.1 %


(36.0-47.0) 


 





 


Mean Corpuscular Hemoglobin


Concent 


 32 g/dL


(31-37) 


 











Problem List


Problems


Medical Problems:


(1) Acute pancreatitis


Status: Acute  





(2) Cholelithiasis


Status: Acute  








Assessment/Plan


severe pancreatitis


cont supportive care


agree with rationale and plan by IR.  No surgical plans currently.  Pseudocyst 

may resolve on it's own or will tentatively plan drainage procedure with 

cholecystectomy in 3 months.











GAMAL ZHOU MD              May 6, 2020 12:19

## 2020-05-06 NOTE — PDOC
Subjective:


Subjective:


Lower abd pain contributes to nausea.





Objective:


Objective:


D/w nurse - for paracentesis later, also to have transfusion.


Vital Signs:





                                   Vital Signs








  Date Time  Temp Pulse Resp B/P (MAP) Pulse Ox O2 Delivery O2 Flow Rate FiO2


 


5/6/20 11:32      Trach Collar 8.0 


 


5/6/20 11:00  108 33 160/80 (106) 98   


 


5/6/20 10:00 98.4       





 98.4       








Labs:





Laboratory Tests








Test


 5/5/20


12:18 5/5/20


18:13 5/6/20


00:09 5/6/20


06:05


 


Glucose (Fingerstick) 147 mg/dL  143 mg/dL  151 mg/dL  


 


White Blood Count    8.8 x10^3/uL 


 


Red Blood Count    2.30 x10^6/uL 


 


Hemoglobin    6.8 g/dL 


 


Hematocrit    20.9 % 


 


Mean Corpuscular Volume    91 fL 


 


Mean Corpuscular Hemoglobin    29 pg 


 


Mean Corpuscular Hemoglobin


Concent 


 


 


 32 g/dL 





 


Red Cell Distribution Width    18.8 % 


 


Platelet Count    416 x10^3/uL 


 


Neutrophils (%) (Auto)    70 % 


 


Lymphocytes (%) (Auto)    21 % 


 


Monocytes (%) (Auto)    6 % 


 


Eosinophils (%) (Auto)    3 % 


 


Basophils (%) (Auto)    0 % 


 


Neutrophils # (Auto)    6.1 x10^3/uL 


 


Lymphocytes # (Auto)    1.9 x10^3/uL 


 


Monocytes # (Auto)    0.5 x10^3/uL 


 


Eosinophils # (Auto)    0.2 x10^3/uL 


 


Basophils # (Auto)    0.0 x10^3/uL 


 


Sodium Level    152 mmol/L 


 


Potassium Level    4.3 mmol/L 


 


Chloride Level    113 mmol/L 


 


Carbon Dioxide Level    31 mmol/L 


 


Anion Gap    8 


 


Blood Urea Nitrogen    38 mg/dL 


 


Creatinine    0.9 mg/dL 


 


Estimated GFR


(Cockcroft-Gault) 


 


 


 66.5 





 


BUN/Creatinine Ratio    42 


 


Glucose Level    137 mg/dL 


 


Calcium Level    9.0 mg/dL 


 


Total Bilirubin    0.4 mg/dL 


 


Aspartate Amino Transf


(AST/SGOT) 


 


 


 35 U/L 





 


Alanine Aminotransferase


(ALT/SGPT) 


 


 


 30 U/L 





 


Alkaline Phosphatase    84 U/L 


 


Total Protein    5.6 g/dL 


 


Albumin    1.8 g/dL 


 


Albumin/Globulin Ratio    0.5 


 


Test


 5/6/20


06:09 5/6/20


08:20 


 





 


Glucose (Fingerstick) 128 mg/dL    


 


Hemoglobin  6.4 g/dL   


 


Hematocrit  20.1 %   


 


Mean Corpuscular Hemoglobin


Concent 


 32 g/dL 


 


 














PE:





GEN: NAD, resting


HEENT: trach shield


HEART: mildly tachycardic


ABD: distended, quiet, uncomfortable


NEURO/PSYCH: A & O 3





A/P:


Gallstone pancreatitis, MOSF





--


Plans for paracentesis.





Hemodynamically unstable?:  No


Is patient in severe pain?:  Yes


Is NPO status required?:  Yes











RAGHAVENDRA MURCIA          May 6, 2020 11:41

## 2020-05-06 NOTE — NUR
Pharmacy TPN Dosing Note



S: JESENIA RICH is a 49 year old F Currently receiving Central Continuous TPN started 
03/18/20



B:Pertinent PMH: Necrotizing pancreatitis

Height: 5 feet, 8 inches

Weight: 101.9 kg



Current diet: NPO 



LABS:

Sodium:    152 

Potassium: 4.3 

Chloride:  113 

Calcium:   9.0 

Corrected Calcium: 10.76 

Magnesium: 2 

CO2:       31 

SCr:       0.9 

Glucose:   128-156 

Albumin:   1.8 

AST:       35 

ALT:       30 



TPN FORMULA:

TPN TYPE:  Central Continuous

AMINO ACIDS:         90 gm

DEXTROSE:            225 gm

LIPIDS:              30 gm



POTASSIUM CHLORIDE:  75 mEq

MAGNESIUM:           15 mEq

CALCIUM:             8 mEq

INSULIN:             20 units

MULTIPLE VITAMIN:    10 ml

TRACE ELEMENTS:      0.5 ml(s)



TPN PLAN:  Continue same.





R: Continue TPN 

Will monitor electrolytes, glucose, and tolerance to TPN.



 Maribell Vazquez RUTHANN, 05/06/20 0932

## 2020-05-06 NOTE — PDOC
PULMONARY PROGRESS NOTES


Subjective


Patient intubated on 3/23 , s/p trach 4/6, awake alert follows commands


Vitals





Vital Signs








  Date Time  Temp Pulse Resp B/P (MAP) Pulse Ox O2 Delivery O2 Flow Rate FiO2


 


5/6/20 14:01 98.4 90 27 109/52    





 98.4       


 


5/6/20 14:00     96 Ventilator  


 


5/6/20 13:14       8.0 








ROS:  No Nausea, No Chest Pain, No Abdominal Pain, No Increase Cough


General:  Alert, No acute distress


HEENT:  Other (nc at perrl   nose clear  nech  trach site ok  no lad   no th

yromegaly)


Lungs:  Crackles


Cardiovascular:  S1, S2


Abdomen:  Soft, Non-tender, Other (firm)


Neuro Exam:  Alert


Extremities:  Other (+3 generalized edema )


Skin:  Warm, Dry


Labs





Laboratory Tests








Test


 5/4/20


18:17 5/5/20


00:21 5/5/20


06:35 5/5/20


06:38


 


Glucose (Fingerstick)


 145 mg/dL


(70-99) 153 mg/dL


(70-99) 


 102 mg/dL


(70-99)


 


White Blood Count


 


 


 7.3 x10^3/uL


(4.0-11.0) 





 


Red Blood Count


 


 


 2.55 x10^6/uL


(3.50-5.40) 





 


Hemoglobin


 


 


 7.3 g/dL


(12.0-15.5) 





 


Hematocrit


 


 


 23.2 %


(36.0-47.0) 





 


Mean Corpuscular Volume   91 fL ()  


 


Mean Corpuscular Hemoglobin   29 pg (25-35)  


 


Mean Corpuscular Hemoglobin


Concent 


 


 32 g/dL


(31-37) 





 


Red Cell Distribution Width


 


 


 18.5 %


(11.5-14.5) 





 


Platelet Count


 


 


 370 x10^3/uL


(140-400) 





 


Neutrophils (%) (Auto)   75 % (31-73)  


 


Lymphocytes (%) (Auto)   17 % (24-48)  


 


Monocytes (%) (Auto)   5 % (0-9)  


 


Eosinophils (%) (Auto)   3 % (0-3)  


 


Basophils (%) (Auto)   1 % (0-3)  


 


Neutrophils # (Auto)


 


 


 5.5 x10^3/uL


(1.8-7.7) 





 


Lymphocytes # (Auto)


 


 


 1.2 x10^3/uL


(1.0-4.8) 





 


Monocytes # (Auto)


 


 


 0.4 x10^3/uL


(0.0-1.1) 





 


Eosinophils # (Auto)


 


 


 0.2 x10^3/uL


(0.0-0.7) 





 


Basophils # (Auto)


 


 


 0.0 x10^3/uL


(0.0-0.2) 





 


Sodium Level


 


 


 153 mmol/L


(136-145) 





 


Potassium Level


 


 


 4.1 mmol/L


(3.5-5.1) 





 


Chloride Level


 


 


 114 mmol/L


() 





 


Carbon Dioxide Level


 


 


 33 mmol/L


(21-32) 





 


Anion Gap   6 (6-14)  


 


Blood Urea Nitrogen


 


 


 41 mg/dL


(7-20) 





 


Creatinine


 


 


 0.9 mg/dL


(0.6-1.0) 





 


Estimated GFR


(Cockcroft-Gault) 


 


 66.5 


 





 


Glucose Level


 


 


 104 mg/dL


(70-99) 





 


Calcium Level


 


 


 8.6 mg/dL


(8.5-10.1) 





 


Phosphorus Level


 


 


 3.2 mg/dL


(2.6-4.7) 





 


Magnesium Level


 


 


 2.0 mg/dL


(1.8-2.4) 





 


Test


 5/5/20


12:18 5/5/20


18:13 5/6/20


00:09 5/6/20


06:05


 


Glucose (Fingerstick)


 147 mg/dL


(70-99) 143 mg/dL


(70-99) 151 mg/dL


(70-99) 





 


White Blood Count


 


 


 


 8.8 x10^3/uL


(4.0-11.0)


 


Red Blood Count


 


 


 


 2.30 x10^6/uL


(3.50-5.40)


 


Hemoglobin


 


 


 


 6.8 g/dL


(12.0-15.5)


 


Hematocrit


 


 


 


 20.9 %


(36.0-47.0)


 


Mean Corpuscular Volume    91 fL () 


 


Mean Corpuscular Hemoglobin    29 pg (25-35) 


 


Mean Corpuscular Hemoglobin


Concent 


 


 


 32 g/dL


(31-37)


 


Red Cell Distribution Width


 


 


 


 18.8 %


(11.5-14.5)


 


Platelet Count


 


 


 


 416 x10^3/uL


(140-400)


 


Neutrophils (%) (Auto)    70 % (31-73) 


 


Lymphocytes (%) (Auto)    21 % (24-48) 


 


Monocytes (%) (Auto)    6 % (0-9) 


 


Eosinophils (%) (Auto)    3 % (0-3) 


 


Basophils (%) (Auto)    0 % (0-3) 


 


Neutrophils # (Auto)


 


 


 


 6.1 x10^3/uL


(1.8-7.7)


 


Lymphocytes # (Auto)


 


 


 


 1.9 x10^3/uL


(1.0-4.8)


 


Monocytes # (Auto)


 


 


 


 0.5 x10^3/uL


(0.0-1.1)


 


Eosinophils # (Auto)


 


 


 


 0.2 x10^3/uL


(0.0-0.7)


 


Basophils # (Auto)


 


 


 


 0.0 x10^3/uL


(0.0-0.2)


 


Sodium Level


 


 


 


 152 mmol/L


(136-145)


 


Potassium Level


 


 


 


 4.3 mmol/L


(3.5-5.1)


 


Chloride Level


 


 


 


 113 mmol/L


()


 


Carbon Dioxide Level


 


 


 


 31 mmol/L


(21-32)


 


Anion Gap    8 (6-14) 


 


Blood Urea Nitrogen


 


 


 


 38 mg/dL


(7-20)


 


Creatinine


 


 


 


 0.9 mg/dL


(0.6-1.0)


 


Estimated GFR


(Cockcroft-Gault) 


 


 


 66.5 





 


BUN/Creatinine Ratio    42 (6-20) 


 


Glucose Level


 


 


 


 137 mg/dL


(70-99)


 


Calcium Level


 


 


 


 9.0 mg/dL


(8.5-10.1)


 


Total Bilirubin


 


 


 


 0.4 mg/dL


(0.2-1.0)


 


Aspartate Amino Transf


(AST/SGOT) 


 


 


 35 U/L (15-37) 





 


Alanine Aminotransferase


(ALT/SGPT) 


 


 


 30 U/L (14-59) 





 


Alkaline Phosphatase


 


 


 


 84 U/L


()


 


Total Protein


 


 


 


 5.6 g/dL


(6.4-8.2)


 


Albumin


 


 


 


 1.8 g/dL


(3.4-5.0)


 


Albumin/Globulin Ratio    0.5 (1.0-1.7) 


 


Test


 5/6/20


06:09 5/6/20


08:20 5/6/20


13:00 





 


Glucose (Fingerstick)


 128 mg/dL


(70-99) 


 156 mg/dL


(70-99) 





 


Hemoglobin


 


 6.4 g/dL


(12.0-15.5) 


 





 


Hematocrit


 


 20.1 %


(36.0-47.0) 


 





 


Mean Corpuscular Hemoglobin


Concent 


 32 g/dL


(31-37) 


 











Laboratory Tests








Test


 5/5/20


18:13 5/6/20


00:09 5/6/20


06:05 5/6/20


06:09


 


Glucose (Fingerstick)


 143 mg/dL


(70-99) 151 mg/dL


(70-99) 


 128 mg/dL


(70-99)


 


White Blood Count


 


 


 8.8 x10^3/uL


(4.0-11.0) 





 


Red Blood Count


 


 


 2.30 x10^6/uL


(3.50-5.40) 





 


Hemoglobin


 


 


 6.8 g/dL


(12.0-15.5) 





 


Hematocrit


 


 


 20.9 %


(36.0-47.0) 





 


Mean Corpuscular Volume   91 fL ()  


 


Mean Corpuscular Hemoglobin   29 pg (25-35)  


 


Mean Corpuscular Hemoglobin


Concent 


 


 32 g/dL


(31-37) 





 


Red Cell Distribution Width


 


 


 18.8 %


(11.5-14.5) 





 


Platelet Count


 


 


 416 x10^3/uL


(140-400) 





 


Neutrophils (%) (Auto)   70 % (31-73)  


 


Lymphocytes (%) (Auto)   21 % (24-48)  


 


Monocytes (%) (Auto)   6 % (0-9)  


 


Eosinophils (%) (Auto)   3 % (0-3)  


 


Basophils (%) (Auto)   0 % (0-3)  


 


Neutrophils # (Auto)


 


 


 6.1 x10^3/uL


(1.8-7.7) 





 


Lymphocytes # (Auto)


 


 


 1.9 x10^3/uL


(1.0-4.8) 





 


Monocytes # (Auto)


 


 


 0.5 x10^3/uL


(0.0-1.1) 





 


Eosinophils # (Auto)


 


 


 0.2 x10^3/uL


(0.0-0.7) 





 


Basophils # (Auto)


 


 


 0.0 x10^3/uL


(0.0-0.2) 





 


Sodium Level


 


 


 152 mmol/L


(136-145) 





 


Potassium Level


 


 


 4.3 mmol/L


(3.5-5.1) 





 


Chloride Level


 


 


 113 mmol/L


() 





 


Carbon Dioxide Level


 


 


 31 mmol/L


(21-32) 





 


Anion Gap   8 (6-14)  


 


Blood Urea Nitrogen


 


 


 38 mg/dL


(7-20) 





 


Creatinine


 


 


 0.9 mg/dL


(0.6-1.0) 





 


Estimated GFR


(Cockcroft-Gault) 


 


 66.5 


 





 


BUN/Creatinine Ratio   42 (6-20)  


 


Glucose Level


 


 


 137 mg/dL


(70-99) 





 


Calcium Level


 


 


 9.0 mg/dL


(8.5-10.1) 





 


Total Bilirubin


 


 


 0.4 mg/dL


(0.2-1.0) 





 


Aspartate Amino Transf


(AST/SGOT) 


 


 35 U/L (15-37) 


 





 


Alanine Aminotransferase


(ALT/SGPT) 


 


 30 U/L (14-59) 


 





 


Alkaline Phosphatase


 


 


 84 U/L


() 





 


Total Protein


 


 


 5.6 g/dL


(6.4-8.2) 





 


Albumin


 


 


 1.8 g/dL


(3.4-5.0) 





 


Albumin/Globulin Ratio   0.5 (1.0-1.7)  


 


Test


 5/6/20


08:20 5/6/20


13:00 


 





 


Hemoglobin


 6.4 g/dL


(12.0-15.5) 


 


 





 


Hematocrit


 20.1 %


(36.0-47.0) 


 


 





 


Mean Corpuscular Hemoglobin


Concent 32 g/dL


(31-37) 


 


 





 


Glucose (Fingerstick)


 


 156 mg/dL


(70-99) 


 











Medications





Active Scripts








 Medications  Dose


 Route/Sig


 Max Daily Dose Days Date Category


 


 Bisoprolol


 Fumarate 5 Mg


 Tablet  10 Mg


 PO DAILY


   3/16/20 Reported











Impression


.


IMPRESSION:


1.  Acute hypoxemic respiratory failure secondary to ARDS status post trach,


2.  Gallstone pancreatitis


3.  Severe metabolic acidosis.stable


4.  Acute kidney injury-stable, ON HD-- continue to improve 


5.  Acute gallstone pancreatitis.


6.  Hypoalbuminemia.


7.  Moderate persistent effusions


8.  Fever-  Per ID, per surgery


9.  Chronic anemia


10. Covid 19 testing negative


11. Moderate to large ascites-S/P paracentisis


12.S/P paracentisis with 4 liters removed on 4/15/20














Surgery note


Operative Note:


After obtaining informed consent, patient was taken to OR, induced under GETA 

and prepped in the usual fashion.  5 mm port placed umbilical and right mid 

abdomen, all under laparoscopic guidance.  Large amount of ascites encountered 

and aspirated off.  Fluid was clear with some whitish debris.  Viscera was 

completely locked in with obliteration of all planes, preventing any significant

exploration.  Copious irrigation.





Given patient's overall clinical improvement, favor against open procedure and 

attempt at cholecystectomy and/or necrosectomy, given high risk of complicat

ions.  Cholecystectomy will need to be performed, but favor waiting 3 months.





19 ROBERT drain placed and secured with 3 0 nylon.  Skin repaired with 4 0 monocryl.

 Dressing placed.





Patient tolerated procedure well and sent to PACU in stable condition.  All c

ounts correct.  Wound class is 4.





Plan


.


Patient scheduled to undergo paracentesis today


Trach shield


precedex for anxiety, prn, avoid excessive sedation


Follow surgery input


Follow ID rec, abx per id


Follow nephrology recs 


Nutritional support per surgery: continue TPN for nutrition 


DVT/GI PPX : heparin Sq/ protonix


 


d/w RN/RT





CODE:FULL











STEVE MIRANDA MD               May 6, 2020 14:55

## 2020-05-06 NOTE — PDOC
SUBJECTIVE


ROS


stable





OBJECTIVE


Vital Signs





Vital Signs








  Date Time  Temp Pulse Resp B/P (MAP) Pulse Ox O2 Delivery O2 Flow Rate FiO2


 


5/6/20 12:05 98.8 112 36 162/71    





 98.8       


 


5/6/20 12:00     99 Tracheal Collar 8.0 








I & 0











Intake and Output 


 


 5/6/20





 07:00


 


Intake Total 2812 ml


 


Output Total 2180 ml


 


Balance 632 ml


 


 


 


Intake Oral 0 ml


 


IV Total 2812 ml


 


Output Urine Total 2110 ml


 


Drainage Total 70 ml











PHYSICAL EXAM


Physical Exam


GENERAL: NAD  


HEENT:  oral cavity dry 


NECK:  Trach/vent 


LUNGS: Non labored  


HEART:  S1, S2, regular 


ABDOMEN: Distended, hypoactive BS, drain placement (4/27 )


: [place] (4/14)


EXTREMITIES: Generalized edema,


DERMATOLOGIC:  Warm and dry.  No generalized rash.





DIAGNOSIS/ASSESSMENT


Assessment & Plan


ARF-- ATN,requiring CRRT and HD 


Off HD, renal function improved  Excellent UOP, dced  IV Diuretics  on 5/2 


 currently on TPN,, avoid nephrotoxins


CT 5/4-?  Developing right great 2 hydronephrosis. No  radiopaque stones.bladder

obscured by ascites  


  


Anemia-  Currently on YOLI , Hgb dropped 





Hypernatremia -- DCed Bumex ,   





HyPokalemia- replace as needed 





Anasarca due to 3rd spacing , Diuretics prn 





Hyperkalemia- resolved  





Acute pancreatitis with persistent  necrosis


 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 

the pancreas


   4/27 status post ROBERT drain placement; 





 Cholelithiasis with possible cholecystitis.





Moderate pleural effusions, may be slightly improved. Infiltrate/atelectasis in 

both lung bases.


  


 Ascites - post paracentesis  in the past


Possibly paracentesis today  





Acute hypoxic resp failure ,bilateral pleural effusion


  Trach in place,


 


HTN 





COVID-19 neg, 3/26





COMMENT/RELEVANT DATA


Meds





Current Medications








 Medications


  (Trade)  Dose


 Ordered  Sig/Yee  Start Time


 Stop Time Status Last Admin


Dose Admin


 


 Acetaminophen


  (Tylenol Supp)  650 mg  PRN Q6HRS  PRN  3/24/20 10:30


    5/5/20 09:12


650 MG


 


 Acetaminophen


  (Tylenol)  650 mg  PRN Q6HRS  PRN  3/21/20 03:36


    4/16/20 19:56


650 MG


 


 Albumin Human  200 ml @ 


 200 mls/hr  1X PRN  PRN  4/21/20 08:00


 4/21/20 13:59 DC 4/21/20 08:40


200 MLS/HR


 


 Albuterol Sulfate


  (Ventolin Neb


 Soln)  2.5 mg  1X  ONCE  3/17/20 22:30


 3/17/20 22:31 DC 3/18/20 00:56


2.5 MG


 


 Alteplase,


 Recombinant


  (Cathflo For


 Central Catheter


 Clearance)  1 mg  1X  ONCE  4/24/20 10:45


 4/24/20 10:46 DC 4/24/20 11:44


1 MG


 


 Amino Acids/


 Glycerin/


 Electrolytes  1,000 ml @ 


 75 mls/hr  [id number]  4/20/20 21:15


   UNV  





 


 Artificial Tears


  (Artificial


 Tears)  1 drop  PRN Q15MIN  PRN  4/29/20 05:30


    5/5/20 09:10


1 DROP


 


 Atenolol


  (Tenormin)  100 mg  DAILY  3/17/20 09:00


 3/16/20 20:08 DC  





 


 Atropine Sulfate


  (ATROPINE 0.5mg


 SYRINGE)  0.5 mg  PRN Q5MIN  PRN  4/2/20 08:15


     





 


 Benzocaine


  (Hurricaine One)  1 spray  1X  ONCE  3/20/20 14:30


 3/20/20 14:31 DC 3/20/20 16:38


1 SPRAY


 


 Bisacodyl


  (Dulcolax Supp)  10 mg  STK-MED ONCE  4/27/20 10:59


 4/27/20 10:59 DC  





 


 Bumetanide


  (Bumex)  2 mg  [id number]  4/28/20 14:00


 5/2/20 14:10 DC 5/1/20 13:50


2 MG


 


 Bupivacaine HCl/


 Epinephrine Bitart


  (Sensorcain-Epi


 0.5%-1:289407 Mpf)  30 ml  STK-MED ONCE  4/27/20 10:58


 4/27/20 10:58 DC 4/27/20 12:01


7 ML


 


 Calcium Carbonate/


 Glycine


  (Tums)  500 mg  PRN [id number]  PRN  3/18/20 17:45


     





 


 Calcium Chloride


 1000 mg/Sodium


 Chloride  110 ml @ 


 220 mls/hr  1X  ONCE  3/17/20 22:30


 3/17/20 22:59 DC 3/17/20 22:11


220 MLS/HR


 


 Calcium Chloride


 3000 mg/Sodium


 Chloride  1,030 ml @ 


 50 mls/hr  [id number]  3/19/20 08:00


 3/21/20 15:23 DC 3/21/20 02:17


50 MLS/HR


 


 Calcium Gluconate


  (Calcium


 Gluconate)  2,000 mg  1X  ONCE  3/19/20 02:15


 3/19/20 02:16 DC 3/19/20 02:19


2,000 MG


 


 Calcium Gluconate


 1000 mg/Sodium


 Chloride  110 ml @ 


 220 mls/hr  1X  ONCE  3/18/20 03:30


 3/18/20 03:59 DC 3/18/20 03:21


220 MLS/HR


 


 Calcium Gluconate


 2000 mg/Sodium


 Chloride  120 ml @ 


 220 mls/hr  1X  ONCE  3/18/20 07:30


 3/18/20 08:02 DC 3/18/20 09:05


220 MLS/HR


 


 Cefepime HCl


  (Maxipime)  2 gm  Q12HR  3/25/20 09:00


 4/8/20 09:58 DC 4/7/20 20:56


2 GM


 


 Cellulose


  (Surgicel


 Fibrillar 1x2)  1 each  STK-MED ONCE  4/6/20 11:00


 4/6/20 11:01 DC  





 


 Cellulose


  (Surgicel


 Hemostat 2x14)  1 each  STK-MED ONCE  4/27/20 10:58


 4/27/20 10:59 DC  





 


 Cellulose


  (Surgicel


 Hemostat 4x8)  1 each  STK-MED ONCE  4/27/20 10:58


 4/27/20 10:59 DC  





 


 Cyclobenzaprine


 HCl


  (Flexeril)  10 mg  PRN Q6HRS  PRN  4/30/20 10:45


     





 


 Daptomycin 430 mg/


 Sodium Chloride  50 ml @ 


 100 mls/hr  Q24H  4/25/20 13:00


 4/30/20 20:58 DC 4/30/20 13:00


100 MLS/HR


 


 Daptomycin 485 mg/


 Sodium Chloride  50 ml @ 


 100 mls/hr  Q24H  5/4/20 11:00


    5/6/20 09:51


100 MLS/HR


 


 Daptomycin 500 mg/


 Sodium Chloride  50 ml @ 


 100 mls/hr  Q48H  3/25/20 08:30


 4/10/20 10:07 DC 4/10/20 09:57


100 MLS/HR


 


 Dexamethasone


 Sodium Phosphate


  (Decadron)  4 mg  STK-MED ONCE  4/27/20 10:56


 4/27/20 10:57 DC  





 


 Dexmedetomidine


 HCl 400 mcg/


 Sodium Chloride  100 ml @ 0


 mls/hr  CONT  PRN  4/2/20 08:15


    5/6/20 08:35


16.7 MLS/HR


 


 Dextrose


  (Dextrose


 50%-Water Syringe)  12.5 gm  PRN Q15MIN  PRN  3/16/20 09:30


     





 


 Digoxin


  (Lanoxin)  125 mcg  1X  ONCE  3/19/20 18:00


 3/19/20 18:01 DC 3/19/20 17:10


125 MCG


 


 Diphenhydramine


 HCl


  (Benadryl)  25 mg  1X PRN  PRN  4/24/20 15:45


 4/25/20 15:44 DC  





 


 Enoxaparin Sodium


  (Lovenox 100mg


 Syringe)  100 mg  Q12HR  4/21/20 21:00


   UNV  





 


 Etomidate


  (Amidate)  8 mg  1X  ONCE  3/23/20 08:30


 3/23/20 08:31 DC 3/23/20 08:33


8 MG


 


 Fentanyl Citrate


  (Fentanyl 2ml


 Vial)  100 mcg  STK-MED ONCE  4/27/20 10:56


 4/27/20 10:57 DC  





 


 Furosemide


  (Lasix)  40 mg  1X  ONCE  4/13/20 14:30


 4/13/20 14:31 DC 4/13/20 14:39


40 MG


 


 Haloperidol


 Lactate


  (Haldol Inj)  3 mg  1X  ONCE  5/4/20 14:30


 5/4/20 14:31 DC 5/4/20 14:37


3 MG


 


 Heparin Sodium


  (Porcine)


  (Hep Lock Adult)  500 unit  STK-MED ONCE  4/7/20 09:29


 4/7/20 09:30 DC  





 


 Heparin Sodium


  (Porcine)


  (Heparin Sodium)  5,000 unit  Q12HR  4/27/20 21:00


    5/5/20 22:04


5,000 UNIT


 


 Heparin Sodium


  (Porcine) 1000


 unit/Sodium


 Chloride  1,001 ml @ 


 1,001 mls/hr  1X  ONCE  4/27/20 12:00


 4/27/20 12:59 DC  





 


 Hydromorphone HCl


  (Dilaudid


 Standard PCA)  12 mg  STK-MED ONCE  4/30/20 13:47


 5/1/20 11:03 DC  





 


 Hydromorphone HCl


  (Dilaudid)  1 mg  PRN Q4HRS  PRN  5/4/20 19:00


    5/6/20 06:53


1 MG


 


 Info


  (CONTRAST GIVEN


 -- Rx MONITORING)  1 each  PRN DAILY  PRN  3/30/20 11:45


 4/1/20 11:44 DC  





 


 Info


  (Icu Electrolyte


 Protocol)  1 ea  CONT PRN  PRN  3/29/20 13:15


     





 


 Info


  (PHARMACY


 MONITORING -- do


 not chart)  1 each  PRN DAILY  PRN  4/24/20 15:45


     





 


 Info


  (Tpn Per


 Pharmacy)  1 each  PRN DAILY  PRN  3/18/20 12:30


   UNV  





 


 Insulin Human


 Lispro


  (HumaLOG)  0-9 UNITS  Q6HRS  3/16/20 09:30


    5/5/20 00:00


4 UNITS


 


 Insulin Human


 Regular


  (HumuLIN R VIAL)  5 unit  1X  ONCE  3/17/20 22:30


 3/17/20 22:31 DC 3/17/20 22:14


5 UNIT


 


 Iohexol


  (Omnipaque 240


 Mg/ml)  30 ml  1X  ONCE  3/30/20 11:30


 3/30/20 11:33 DC 3/30/20 11:30


30 ML


 


 Iohexol


  (Omnipaque 300


 Mg/ml)  50 ml  STK-MED ONCE  4/27/20 10:58


 4/27/20 10:59 DC  





 


 Iohexol


  (Omnipaque 350


 Mg/ml)  90 ml  1X  ONCE  3/16/20 03:30


 3/16/20 03:31 DC 3/16/20 03:25


90 ML


 


 Ketorolac


 Tromethamine


  (Toradol 30mg


 Vial)  30 mg  1X  ONCE  3/16/20 03:00


 3/16/20 03:01 DC 3/16/20 02:54


30 MG


 


 Lidocaine HCl


  (Buffered


 Lidocaine 1%)  6 ml  1X  ONCE  5/6/20 12:45


 5/6/20 12:46 DC 5/6/20 12:53


6 ML


 


 Lidocaine HCl


  (Glydo


  (Lidocaine) Jelly)  1 thomas  1X  ONCE  3/20/20 14:30


 3/20/20 14:31 DC 3/20/20 16:38


1 THOMAS


 


 Lidocaine HCl


  (Xylocaine-Mpf


 1% 2ml Vial)  2 ml  PRN 1X  PRN  4/27/20 07:00


 4/28/20 06:59 DC  





 


 Linezolid/Dextrose  300 ml @ 


 300 mls/hr  Q12HR  4/10/20 11:00


 4/21/20 08:10 DC 4/20/20 20:40


300 MLS/HR


 


 Lorazepam


  (Ativan Inj)  1 mg  PRN Q4HRS  PRN  3/19/20 09:00


 4/17/20 09:19 DC 4/17/20 03:51


1 MG


 


 Magnesium Sulfate  50 ml @ 25


 mls/hr  1X  ONCE  5/2/20 10:30


 5/2/20 12:29 DC 5/2/20 10:34


25 MLS/HR


 


 Meropenem 1 gm/


 Sodium Chloride  100 ml @ 


 200 mls/hr  Q8HRS  4/28/20 14:00


    5/6/20 06:04


200 MLS/HR


 


 Meropenem 500 mg/


 Sodium Chloride  50 ml @ 


 100 mls/hr  Q12H  4/8/20 10:00


 4/28/20 12:37 DC 4/28/20 10:45


100 MLS/HR


 


 Metoprolol


 Tartrate


  (Lopressor Vial)  5 mg  Q6HRS  3/17/20 10:15


 3/28/20 08:48 DC 3/26/20 00:12


5 MG


 


 Metronidazole  100 ml @ 


 100 mls/hr  Q8HRS  4/14/20 10:00


 4/21/20 08:10 DC 4/21/20 06:04


100 MLS/HR


 


 Micafungin Sodium


 100 mg/Dextrose  100 ml @ 


 100 mls/hr  Q24H  5/4/20 11:00


    5/6/20 10:44


100 MLS/HR


 


 Midazolam HCl


  (Versed)  5 mg  1X  ONCE  3/23/20 08:30


 3/23/20 08:31 DC  





 


 Midazolam HCl 100


 mg/Sodium Chloride  100 ml @ 7


 mls/hr  CONT  PRN  3/28/20 16:00


    4/8/20 15:35


7 MLS/HR


 


 Midazolam HCl 50


 mg/Sodium Chloride  50 ml @ 0


 mls/hr  CONT  PRN  3/23/20 08:15


 3/28/20 15:59 DC 3/26/20 22:39


7 MLS/HR


 


 Morphine Sulfate


  (Morphine


 Sulfate)  2 mg  PRN Q2HR  PRN  3/16/20 05:00


 3/17/20 14:15 DC 3/17/20 12:26


2 MG


 


 Multi-Ingred


 Cream/Lotion/Oil/


 Oint


  (Artificial


 Tears Eye


 Ointment)  1 thomas  PRN Q1HR  PRN  3/25/20 17:30


    4/13/20 08:19


1 THOMAS


 


 Naloxone HCl


  (Narcan)  0.4 mg  PRN Q2MIN  PRN  4/27/20 14:30


   UNV  





 


 Norepinephrine


 Bitartrate 8 mg/


 Dextrose  258 ml @ 


 17.299 mls/


 hr  CONT  PRN  3/17/20 15:30


 4/17/20 09:19 DC 4/14/20 12:48


20.9 MLS/HR


 


 Ondansetron HCl


  (Zofran)  4 mg  STK-MED ONCE  4/27/20 10:56


 4/27/20 10:57 DC  





 


 Pantoprazole


 Sodium


  (PROTONIX VIAL


 for IV PUSH)  40 mg  DAILYAC  3/16/20 11:30


    5/6/20 08:36


40 MG


 


 Phenylephrine HCl


  (PHENYLEPHRINE


 in 0.9% NACL PF)  1 mg  STK-MED ONCE  4/27/20 12:34


 4/27/20 12:34 DC  





 


 Piperacillin Sod/


 Tazobactam Sod


 4.5 gm/Sodium


 Chloride  100 ml @ 


 200 mls/hr  1X  ONCE  3/16/20 06:00


 3/16/20 06:29 DC 3/16/20 05:44


200 MLS/HR


 


 Potassium


 Chloride 15 meq/


 Bicarbonate


 Dialysis Soln w/


 out KCl  5,007.5 ml


  @ 1,000 mls/


 hr  Q5H1M  3/29/20 20:00


 4/2/20 13:08 DC 4/1/20 18:14


1,000 MLS/HR


 


 Potassium


 Chloride 20 meq/


 Bicarbonate


 Dialysis Soln w/


 out KCl  5,010 ml @ 


 1,000 mls/hr  Q5H1M  3/25/20 16:00


 3/29/20 19:59 DC 3/29/20 14:54


1,000 MLS/HR


 


 Potassium


 Chloride 75 meq/


 Magnesium Sulfate


 15 meq/Calcium


 Gluconate 8 meq/


 Multivitamins 10


 ml/Chromium/


 Copper/Manganese/


 Seleni/Zn 0.5 ml/


 Insulin Human


 Regular 20 unit/


 Total Parenteral


 Nutrition/Amino


 Acids/Dextrose/


 Fat Emulsion


 Intravenous  1,920 ml @ 


 80 mls/hr  TPN  CONT  5/5/20 22:00


 5/6/20 21:59  5/5/20 22:10


80 MLS/HR


 


 Potassium


 Chloride 75 meq/


 Magnesium Sulfate


 15 meq/Calcium


 Gluconate 8 meq/


 Multivitamins 10


 ml/Chromium/


 Copper/Manganese/


 Seleni/Zn 0.5 ml/


 Insulin Human


 Regular 25 unit/


 Total Parenteral


 Nutrition/Amino


 Acids/Dextrose/


 Fat Emulsion


 Intravenous  1,920 ml @ 


 80 mls/hr  TPN  CONT  5/4/20 22:00


 5/5/20 21:59 DC 5/4/20 23:08


80 MLS/HR


 


 Potassium


 Chloride 75 meq/


 Magnesium Sulfate


 20 meq/Calcium


 Gluconate 10 meq/


 Multivitamins 10


 ml/Chromium/


 Copper/Manganese/


 Seleni/Zn 0.5 ml/


 Insulin Human


 Regular 25 unit/


 Total Parenteral


 Nutrition/Amino


 Acids/Dextrose/


 Fat Emulsion


 Intravenous  1,920 ml @ 


 80 mls/hr  TPN  CONT  5/3/20 22:00


 5/4/20 21:59 DC 5/3/20 22:04


80 MLS/HR


 


 Potassium


 Chloride 75 meq/


 Magnesium Sulfate


 20 meq/Calcium


 Gluconate 10 meq/


 Multivitamins 10


 ml/Chromium/


 Copper/Manganese/


 Seleni/Zn 0.5 ml/


 Insulin Human


 Regular 30 unit/


 Total Parenteral


 Nutrition/Amino


 Acids/Dextrose/


 Fat Emulsion


 Intravenous  1,920 ml @ 


 80 mls/hr  TPN  CONT  5/2/20 22:00


 5/3/20 22:00 DC 5/2/20 21:51


80 MLS/HR


 


 Potassium


 Chloride/Water  100 ml @ 


 100 mls/hr  Q1H  5/2/20 07:00


 5/2/20 10:59 DC 5/2/20 13:05


100 MLS/HR


 


 Potassium


 Phosphate 20 mmol/


 Sodium Chloride  106.6667


 ml @ 


 51.667 m...  1X  ONCE  3/25/20 13:00


 3/25/20 15:03 DC 3/25/20 12:51


51.667 MLS/HR


 


 Potassium Acetate


 30 meq/Magnesium


 Sulfate 20 meq/


 Calcium Gluconate


 10 meq/


 Multivitamins 10


 ml/Chromium/


 Copper/Manganese/


 Seleni/Zn 0.5 ml/


 Insulin Human


 Regular 30 unit/


 Potassium


 Chloride 30 meq/


 Total Parenteral


 Nutrition/Amino


 Acids/Dextrose/


 Fat Emulsion


 Intravenous  1,920 ml @ 


 80 mls/hr  TPN  CONT  5/1/20 22:00


 5/2/20 21:59 DC 5/1/20 22:34


80 MLS/HR


 


 Potassium Acetate


 55 meq/Magnesium


 Sulfate 20 meq/


 Calcium Gluconate


 10 meq/


 Multivitamins 10


 ml/Chromium/


 Copper/Manganese/


 Seleni/Zn 0.5 ml/


 Insulin Human


 Regular 30 unit/


 Total Parenteral


 Nutrition/Amino


 Acids/Dextrose/


 Fat Emulsion


 Intravenous  1,920 ml @ 


 80 mls/hr  TPN  CONT  4/30/20 22:00


 5/1/20 21:59 DC 5/1/20 01:00


80 MLS/HR


 


 Potassium Acetate


 55 meq/Magnesium


 Sulfate 20 meq/


 Calcium Gluconate


 10 meq/


 Multivitamins 10


 ml/Chromium/


 Copper/Manganese/


 Seleni/Zn 0.5 ml/


 Insulin Human


 Regular 35 unit/


 Total Parenteral


 Nutrition/Amino


 Acids/Dextrose/


 Fat Emulsion


 Intravenous  1,920 ml @ 


 80 mls/hr  TPN  CONT  4/28/20 22:00


 4/29/20 21:59 DC 4/28/20 22:02


80 MLS/HR


 


 Potassium Acetate


 65 meq/Magnesium


 Sulfate 20 meq/


 Calcium Gluconate


 10 meq/


 Multivitamins 10


 ml/Chromium/


 Copper/Manganese/


 Seleni/Zn 0.5 ml/


 Insulin Human


 Regular 30 unit/


 Total Parenteral


 Nutrition/Amino


 Acids/Dextrose/


 Fat Emulsion


 Intravenous  1,920 ml @ 


 80 mls/hr  TPN  CONT  4/29/20 22:00


 4/30/20 21:59 DC 4/29/20 22:22


80 MLS/HR


 


 Prochlorperazine


 Edisylate


  (Compazine)  5 mg  PACU PRN  PRN  4/27/20 07:00


 4/28/20 06:59 DC  





 


 Propofol  20 ml @ As


 Directed  STK-MED ONCE  4/27/20 12:26


 4/27/20 12:27 DC  





 


 Ringer's Solution  1,000 ml @ 


 30 mls/hr  Q24H  4/27/20 07:00


 4/27/20 18:59 DC  





 


 Rocuronium Bromide


  (Zemuron)  50 mg  STK-MED ONCE  4/27/20 10:56


 4/27/20 10:57 DC  





 


 Sevoflurane


  (Ultane)  60 ml  STK-MED ONCE  4/27/20 12:26


 4/27/20 12:27 DC  





 


 Sodium


 Bicarbonate 50


 meq/Sodium


 Chloride  1,050 ml @ 


 75 mls/hr  Q14H  3/18/20 07:30


 3/23/20 10:28 DC 3/22/20 21:10


75 MLS/HR


 


 Sodium Acetate 50


 meq/Potassium


 Acetate 55 meq/


 Magnesium Sulfate


 20 meq/Calcium


 Gluconate 10 meq/


 Multivitamins 10


 ml/Chromium/


 Copper/Manganese/


 Seleni/Zn 0.5 ml/


 Insulin Human


 Regular 35 unit/


 Total Parenteral


 Nutrition/Amino


 Acids/Dextrose/


 Fat Emulsion


 Intravenous  1,800 ml @ 


 75 mls/hr  TPN  CONT  4/25/20 22:00


 4/26/20 21:59 DC 4/25/20 22:03


75 MLS/HR


 


 Sodium Chloride  1,000 ml @ 


 25 mls/hr  Q24H  4/27/20 14:30


   UNV  





 


 Sodium Chloride


  (Normal Saline


 Flush)  3 ml  QSHIFT  PRN  4/27/20 13:45


     





 


 Sodium Chloride


 90 meq/Calcium


 Gluconate 10 meq/


 Multivitamins 10


 ml/Chromium/


 Copper/Manganese/


 Seleni/Zn 0.5 ml/


 Total Parenteral


 Nutrition/Amino


 Acids/Dextrose/


 Fat Emulsion


 Intravenous  1,512 ml @ 


 63 mls/hr  TPN  CONT  3/18/20 22:00


 3/19/20 21:59 DC 3/18/20 22:06


63 MLS/HR


 


 Sodium Chloride


 90 meq/Calcium


 Gluconate 10 meq/


 Multivitamins 10


 ml/Chromium/


 Copper/Manganese/


 Seleni/Zn 1 ml/


 Total Parenteral


 Nutrition/Amino


 Acids/Dextrose/


 Fat Emulsion


 Intravenous  55.005 ml


  @ 2.292


 mls/hr  TPN  CONT  3/18/20 22:00


 3/18/20 12:33 DC  





 


 Sodium Chloride


 90 meq/Magnesium


 Sulfate 10 meq/


 Calcium Gluconate


 20 meq/


 Multivitamins 10


 ml/Chromium/


 Copper/Manganese/


 Seleni/Zn 0.5 ml/


 Total Parenteral


 Nutrition/Amino


 Acids/Dextrose/


 Fat Emulsion


 Intravenous  1,512 ml @ 


 63 mls/hr  TPN  CONT  3/19/20 22:00


 3/20/20 21:59 DC 3/19/20 22:25


63 MLS/HR


 


 Sodium Chloride


 90 meq/Magnesium


 Sulfate 12 meq/


 Calcium Gluconate


 15 meq/


 Multivitamins 10


 ml/Chromium/


 Copper/Manganese/


 Seleni/Zn 0.5 ml/


 Insulin Human


 Regular 25 unit/


 Total Parenteral


 Nutrition/Amino


 Acids/Dextrose/


 Fat Emulsion


 Intravenous  1,400 ml @ 


 58.333 mls/


 hr  TPN  CONT  4/8/20 22:00


 4/9/20 21:59 DC 4/8/20 21:41


58.333 MLS/HR


 


 Sodium Chloride


 90 meq/Potassium


 Chloride 15 meq/


 Magnesium Sulfate


 12 meq/Calcium


 Gluconate 15 meq/


 Multivitamins 10


 ml/Chromium/


 Copper/Manganese/


 Seleni/Zn 0.5 ml/


 Insulin Human


 Regular 25 unit/


 Total Parenteral


 Nutrition/Amino


 Acids/Dextrose/


 Fat Emulsion


 Intravenous  1,400 ml @ 


 58.333 mls/


 hr  TPN  CONT  4/7/20 22:00


 4/8/20 21:59 DC 4/7/20 22:13


58.333 MLS/HR


 


 Sodium Chloride


 90 meq/Potassium


 Chloride 15 meq/


 Potassium


 Phosphate 10 mmol/


 Magnesium Sulfate


 8 meq/Calcium


 Gluconate 15 meq/


 Multivitamins 10


 ml/Chromium/


 Copper/Manganese/


 Seleni/Zn 0.5 ml/


 Insulin Human


 Regular 25 unit/


 Total Parenteral


 Nutrition/Amino


 Acids/Dextrose/


 Fat Emulsion


 Intravenous  1,400 ml @ 


 58.333 mls/


 hr  TPN  CONT  4/5/20 22:00


 4/6/20 21:59 DC 4/5/20 21:20


58.333 MLS/HR


 


 Sodium Chloride


 90 meq/Potassium


 Chloride 15 meq/


 Potassium


 Phosphate 10 mmol/


 Magnesium Sulfate


 10 meq/Calcium


 Gluconate 20 meq/


 Multivitamins 10


 ml/Chromium/


 Copper/Manganese/


 Seleni/Zn 0.5 ml/


 Total Parenteral


 Nutrition/Amino


 Acids/Dextrose/


 Fat Emulsion


 Intravenous  1,400 ml @ 


 58.333 mls/


 hr  TPN  CONT  3/23/20 22:00


 3/24/20 21:59 DC 3/23/20 21:42


58.333 MLS/HR


 


 Sodium Chloride


 90 meq/Potassium


 Chloride 15 meq/


 Potassium


 Phosphate 10 mmol/


 Magnesium Sulfate


 12 meq/Calcium


 Gluconate 15 meq/


 Multivitamins 10


 ml/Chromium/


 Copper/Manganese/


 Seleni/Zn 0.5 ml/


 Insulin Human


 Regular 25 unit/


 Total Parenteral


 Nutrition/Amino


 Acids/Dextrose/


 Fat Emulsion


 Intravenous  1,400 ml @ 


 58.333 mls/


 hr  TPN  CONT  4/6/20 22:00


 4/7/20 21:59 DC 4/6/20 22:24


58.333 MLS/HR


 


 Sodium Chloride


 90 meq/Potassium


 Chloride 15 meq/


 Potassium


 Phosphate 15 mmol/


 Magnesium Sulfate


 10 meq/Calcium


 Gluconate 15 meq/


 Multivitamins 10


 ml/Chromium/


 Copper/Manganese/


 Seleni/Zn 0.5 ml/


 Total Parenteral


 Nutrition/Amino


 Acids/Dextrose/


 Fat Emulsion


 Intravenous  1,400 ml @ 


 58.333 mls/


 hr  TPN  CONT  3/24/20 22:00


 3/25/20 21:59 DC 3/24/20 22:17


58.333 MLS/HR


 


 Sodium Chloride


 90 meq/Potassium


 Chloride 15 meq/


 Potassium


 Phosphate 15 mmol/


 Magnesium Sulfate


 10 meq/Calcium


 Gluconate 20 meq/


 Multivitamins 10


 ml/Chromium/


 Copper/Manganese/


 Seleni/Zn 0.5 ml/


 Total Parenteral


 Nutrition/Amino


 Acids/Dextrose/


 Fat Emulsion


 Intravenous  1,200 ml @ 


 50 mls/hr  TPN  CONT  3/22/20 22:00


 3/22/20 14:17 DC  





 


 Sodium Chloride


 90 meq/Potassium


 Chloride 15 meq/


 Potassium


 Phosphate 18 mmol/


 Magnesium Sulfate


 8 meq/Calcium


 Gluconate 15 meq/


 Multivitamins 10


 ml/Chromium/


 Copper/Manganese/


 Seleni/Zn 0.5 ml/


 Insulin Human


 Regular 10 unit/


 Total Parenteral


 Nutrition/Amino


 Acids/Dextrose/


 Fat Emulsion


 Intravenous  1,400 ml @ 


 58.333 mls/


 hr  TPN  CONT  3/27/20 22:00


 3/28/20 21:59 DC 3/27/20 21:43


58.333 MLS/HR


 


 Sodium Chloride


 90 meq/Potassium


 Chloride 15 meq/


 Potassium


 Phosphate 18 mmol/


 Magnesium Sulfate


 8 meq/Calcium


 Gluconate 15 meq/


 Multivitamins 10


 ml/Chromium/


 Copper/Manganese/


 Seleni/Zn 0.5 ml/


 Insulin Human


 Regular 15 unit/


 Total Parenteral


 Nutrition/Amino


 Acids/Dextrose/


 Fat Emulsion


 Intravenous  1,400 ml @ 


 58.333 mls/


 hr  TPN  CONT  3/30/20 22:00


 3/31/20 21:59 DC 3/30/20 21:47


58.333 MLS/HR


 


 Sodium Chloride


 90 meq/Potassium


 Chloride 15 meq/


 Potassium


 Phosphate 18 mmol/


 Magnesium Sulfate


 8 meq/Calcium


 Gluconate 15 meq/


 Multivitamins 10


 ml/Chromium/


 Copper/Manganese/


 Seleni/Zn 0.5 ml/


 Insulin Human


 Regular 20 unit/


 Total Parenteral


 Nutrition/Amino


 Acids/Dextrose/


 Fat Emulsion


 Intravenous  1,400 ml @ 


 58.333 mls/


 hr  TPN  CONT  4/2/20 22:00


 4/3/20 21:59 DC 4/2/20 22:45


58.333 MLS/HR


 


 Sodium Chloride


 90 meq/Potassium


 Chloride 15 meq/


 Potassium


 Phosphate 18 mmol/


 Magnesium Sulfate


 8 meq/Calcium


 Gluconate 15 meq/


 Multivitamins 10


 ml/Chromium/


 Copper/Manganese/


 Seleni/Zn 0.5 ml/


 Total Parenteral


 Nutrition/Amino


 Acids/Dextrose/


 Fat Emulsion


 Intravenous  1,400 ml @ 


 58.333 mls/


 hr  TPN  CONT  3/26/20 22:00


 3/27/20 21:59 DC 3/26/20 22:00


58.333 MLS/HR


 


 Sodium Chloride


 90 meq/Potassium


 Phosphate 15 mmol/


 Magnesium Sulfate


 12 meq/Calcium


 Gluconate 15 meq/


 Multivitamins 10


 ml/Chromium/


 Copper/Manganese/


 Seleni/Zn 0.5 ml/


 Insulin Human


 Regular 30 unit/


 Total Parenteral


 Nutrition/Amino


 Acids/Dextrose/


 Fat Emulsion


 Intravenous  1,400 ml @ 


 58.333 mls/


 hr  TPN  CONT  4/10/20 22:00


 4/11/20 21:59 DC 4/10/20 21:49


58.333 MLS/HR


 


 Sodium Chloride


 90 meq/Potassium


 Phosphate 15 mmol/


 Magnesium Sulfate


 12 meq/Calcium


 Gluconate 15 meq/


 Multivitamins 10


 ml/Chromium/


 Copper/Manganese/


 Seleni/Zn 0.5 ml/


 Insulin Human


 Regular 40 unit/


 Total Parenteral


 Nutrition/Amino


 Acids/Dextrose/


 Fat Emulsion


 Intravenous  1,400 ml @ 


 58.333 mls/


 hr  TPN  CONT  4/11/20 22:00


 4/12/20 21:59 DC 4/11/20 21:21


58.333 MLS/HR


 


 Sodium Chloride


 90 meq/Potassium


 Phosphate 19 mmol/


 Magnesium Sulfate


 12 meq/Calcium


 Gluconate 15 meq/


 Multivitamins 10


 ml/Chromium/


 Copper/Manganese/


 Seleni/Zn 0.5 ml/


 Insulin Human


 Regular 40 unit/


 Total Parenteral


 Nutrition/Amino


 Acids/Dextrose/


 Fat Emulsion


 Intravenous  1,400 ml @ 


 58.333 mls/


 hr  TPN  CONT  4/12/20 22:00


 4/13/20 21:59 DC 4/12/20 21:54


58.333 MLS/HR


 


 Sodium Chloride


 90 meq/Potassium


 Phosphate 5 mmol/


 Magnesium Sulfate


 12 meq/Calcium


 Gluconate 15 meq/


 Multivitamins 10


 ml/Chromium/


 Copper/Manganese/


 Seleni/Zn 0.5 ml/


 Insulin Human


 Regular 30 unit/


 Total Parenteral


 Nutrition/Amino


 Acids/Dextrose/


 Fat Emulsion


 Intravenous  1,400 ml @ 


 58.333 mls/


 hr  TPN  CONT  4/9/20 22:00


 4/10/20 21:59 DC 4/9/20 22:08


58.333 MLS/HR


 


 Sodium Chloride


 100 meq/Potassium


 Chloride 40 meq/


 Magnesium Sulfate


 15 meq/Calcium


 Gluconate 15 meq/


 Multivitamins 10


 ml/Chromium/


 Copper/Manganese/


 Seleni/Zn 0.5 ml/


 Insulin Human


 Regular 35 unit/


 Total Parenteral


 Nutrition/Amino


 Acids/Dextrose/


 Fat Emulsion


 Intravenous  1,400 ml @ 


 58.333 mls/


 hr  TPN  CONT  4/19/20 22:00


 4/20/20 21:59 DC 4/19/20 22:46


58.333 MLS/HR


 


 Sodium Chloride


 100 meq/Potassium


 Chloride 40 meq/


 Magnesium Sulfate


 20 meq/Calcium


 Gluconate 10 meq/


 Multivitamins 10


 ml/Chromium/


 Copper/Manganese/


 Seleni/Zn 0.5 ml/


 Insulin Human


 Regular 35 unit/


 Total Parenteral


 Nutrition/Amino


 Acids/Dextrose/


 Fat Emulsion


 Intravenous  1,400 ml @ 


 58.333 mls/


 hr  TPN  CONT  4/23/20 22:00


 4/24/20 21:59 DC 4/24/20 00:06


58.333 MLS/HR


 


 Sodium Chloride


 100 meq/Potassium


 Chloride 40 meq/


 Magnesium Sulfate


 20 meq/Calcium


 Gluconate 15 meq/


 Multivitamins 10


 ml/Chromium/


 Copper/Manganese/


 Seleni/Zn 0.5 ml/


 Insulin Human


 Regular 35 unit/


 Total Parenteral


 Nutrition/Amino


 Acids/Dextrose/


 Fat Emulsion


 Intravenous  1,400 ml @ 


 58.333 mls/


 hr  TPN  CONT  4/22/20 22:00


 4/23/20 21:59 DC 4/22/20 22:27


58.333 MLS/HR


 


 Sodium Chloride


 100 meq/Potassium


 Phosphate 10 mmol/


 Magnesium Sulfate


 12 meq/Calcium


 Gluconate 15 meq/


 Multivitamins 10


 ml/Chromium/


 Copper/Manganese/


 Seleni/Zn 0.5 ml/


 Insulin Human


 Regular 35 unit/


 Potassium


 Chloride 20 meq/


 Total Parenteral


 Nutrition/Amino


 Acids/Dextrose/


 Fat Emulsion


 Intravenous  1,400 ml @ 


 58.333 mls/


 hr  TPN  CONT  4/16/20 22:00


 4/17/20 21:59 DC 4/16/20 22:10


58.333 MLS/HR


 


 Sodium Chloride


 100 meq/Potassium


 Phosphate 19 mmol/


 Magnesium Sulfate


 12 meq/Calcium


 Gluconate 15 meq/


 Multivitamins 10


 ml/Chromium/


 Copper/Manganese/


 Seleni/Zn 0.5 ml/


 Insulin Human


 Regular 40 unit/


 Potassium


 Chloride 20 meq/


 Total Parenteral


 Nutrition/Amino


 Acids/Dextrose/


 Fat Emulsion


 Intravenous  1,400 ml @ 


 58.333 mls/


 hr  TPN  CONT  4/15/20 22:00


 4/16/20 21:59 DC 4/15/20 21:20


58.333 MLS/HR


 


 Sodium Chloride


 100 meq/Potassium


 Phosphate 5 mmol/


 Magnesium Sulfate


 12 meq/Calcium


 Gluconate 15 meq/


 Multivitamins 10


 ml/Chromium/


 Copper/Manganese/


 Seleni/Zn 0.5 ml/


 Insulin Human


 Regular 35 unit/


 Potassium


 Chloride 20 meq/


 Total Parenteral


 Nutrition/Amino


 Acids/Dextrose/


 Fat Emulsion


 Intravenous  1,400 ml @ 


 58.333 mls/


 hr  TPN  CONT  4/17/20 22:00


 4/18/20 21:59 DC 4/17/20 22:59


58.333 MLS/HR


 


 Succinylcholine


 Chloride


  (Anectine)  120 mg  1X  ONCE  3/23/20 08:30


 3/23/20 08:31 DC 3/23/20 08:34


120 MG








Lab





Laboratory Tests








Test


 5/5/20


18:13 5/6/20


00:09 5/6/20


06:05 5/6/20


06:09


 


Glucose (Fingerstick)


 143 mg/dL


(70-99) 151 mg/dL


(70-99) 


 128 mg/dL


(70-99)


 


White Blood Count


 


 


 8.8 x10^3/uL


(4.0-11.0) 





 


Red Blood Count


 


 


 2.30 x10^6/uL


(3.50-5.40) 





 


Hemoglobin


 


 


 6.8 g/dL


(12.0-15.5) 





 


Hematocrit


 


 


 20.9 %


(36.0-47.0) 





 


Mean Corpuscular Volume   91 fL ()  


 


Mean Corpuscular Hemoglobin   29 pg (25-35)  


 


Mean Corpuscular Hemoglobin


Concent 


 


 32 g/dL


(31-37) 





 


Red Cell Distribution Width


 


 


 18.8 %


(11.5-14.5) 





 


Platelet Count


 


 


 416 x10^3/uL


(140-400) 





 


Neutrophils (%) (Auto)   70 % (31-73)  


 


Lymphocytes (%) (Auto)   21 % (24-48)  


 


Monocytes (%) (Auto)   6 % (0-9)  


 


Eosinophils (%) (Auto)   3 % (0-3)  


 


Basophils (%) (Auto)   0 % (0-3)  


 


Neutrophils # (Auto)


 


 


 6.1 x10^3/uL


(1.8-7.7) 





 


Lymphocytes # (Auto)


 


 


 1.9 x10^3/uL


(1.0-4.8) 





 


Monocytes # (Auto)


 


 


 0.5 x10^3/uL


(0.0-1.1) 





 


Eosinophils # (Auto)


 


 


 0.2 x10^3/uL


(0.0-0.7) 





 


Basophils # (Auto)


 


 


 0.0 x10^3/uL


(0.0-0.2) 





 


Sodium Level


 


 


 152 mmol/L


(136-145) 





 


Potassium Level


 


 


 4.3 mmol/L


(3.5-5.1) 





 


Chloride Level


 


 


 113 mmol/L


() 





 


Carbon Dioxide Level


 


 


 31 mmol/L


(21-32) 





 


Anion Gap   8 (6-14)  


 


Blood Urea Nitrogen


 


 


 38 mg/dL


(7-20) 





 


Creatinine


 


 


 0.9 mg/dL


(0.6-1.0) 





 


Estimated GFR


(Cockcroft-Gault) 


 


 66.5 


 





 


BUN/Creatinine Ratio   42 (6-20)  


 


Glucose Level


 


 


 137 mg/dL


(70-99) 





 


Calcium Level


 


 


 9.0 mg/dL


(8.5-10.1) 





 


Total Bilirubin


 


 


 0.4 mg/dL


(0.2-1.0) 





 


Aspartate Amino Transf


(AST/SGOT) 


 


 35 U/L (15-37) 


 





 


Alanine Aminotransferase


(ALT/SGPT) 


 


 30 U/L (14-59) 


 





 


Alkaline Phosphatase


 


 


 84 U/L


() 





 


Total Protein


 


 


 5.6 g/dL


(6.4-8.2) 





 


Albumin


 


 


 1.8 g/dL


(3.4-5.0) 





 


Albumin/Globulin Ratio   0.5 (1.0-1.7)  


 


Test


 5/6/20


08:20 5/6/20


13:00 


 





 


Hemoglobin


 6.4 g/dL


(12.0-15.5) 


 


 





 


Hematocrit


 20.1 %


(36.0-47.0) 


 


 





 


Mean Corpuscular Hemoglobin


Concent 32 g/dL


(31-37) 


 


 





 


Glucose (Fingerstick)


 


 156 mg/dL


(70-99) 


 











Results


All relevant outside records, renal labs, imaging studies, telemetry/EKG's were 

reviewed.











KIMBERLY MYERS MD                 May 6, 2020 13:10

## 2020-05-06 NOTE — PDOC
PROGRESS NOTES


Chief Complaint


Chief Complaint


Acute hypoxic Respiratory failure requiring mechanical ventilation (on vent 

since 3/23)


Tracheostomy


bilateral pleural effusions/pulm edema 


Sepsis


Severe Acute gallstone pancreatitis (not a surgical candidate at this time) with

necrosis


Acute kidney failure now requiring dialysis


Salpingitis


Gallstones (Calculus of gallbladder with acute cholecystitis without 

obstruction)


HTN 


Leukocytosis 


Hypoxia


Uterine fibroid


Intractable pain


Intractable nausea


Covid 19 negative. 


Acute on chronic anemia 


EEG: No seizure activity


ESRD on HD


Hyperglycemia





Plan:


Continue with supportive measures


No surgical plans as of yet


We will continue to follow





History of Present Illness


History of Present Illness


Ms Tadeo is a 50yo F w/ PMHx HTN, prediabetes who presented to the emergency 

room with complaints of abdominal pain on 3/16/2020. Found with Lipase 33198, 

, , Bilirubin 1.4.


CT abdomen confirms pancreatic inflammation, peripancreatic fluid and 

inflammatory changes around the pancreas consistent with pancreatitis. 

Cholelithiasis and 1.4cm uterine fibroid as well as possible left salpingitis. 

Admitted for further care


GI, General surgery, ID, Pulm consulted.





3/17: PICC placed per IR. Renal US negative. Started on levophed. Repeat CT 

abdomen w/ necrosis; 3/18: Dialysis catheter per nephrology; 3/19: On BiPAP; 

3/20: BiPAP, dialysis; 3/21: Overnight Tmax 101.7 , still on BiPAP FiO2 40%, sti

ll on low dose Levophed gtt, TPN initiated. On dialysis


4/6: Tracheostomy; 4/12: S/p tracheostomy on vent spontaneous respirations with 

5 of pressure support 35% FiO2, rectal tube and a [place], off pressors; 

4/14:Still on vent via trach. Removed PICC and CVC LIJ and replaced. CT 

chest/abd/pelvis with bilateral pleural effusion and ascites.


4/15: Renal function stable. Still on vent. More interactive today. Miming wish 

for food. Plan discussed for thoracentesis/paracentesis with daughters today. 

They were under impression patient was doing worse due to a miscommunication 

which has been clarified over the phone. 4.3L removed.


4/17: Febrile overnight 101.8F. More interactive, still on vent. Asking for ice 

by miming; 4/18: Afebrile overnight. TMax last 24 hours 100.6F. Hb 7.1. 

Interactive when awake. 4/22: Transfusion 1u PRBC (6U total since admit)


4/23-4/26: TPN and precedex, vent.


4/27: Tmax 101F overnight. Hb 8.2. HD cath out since 4/24. Alert. On vent SIMV 

35% FiO2. Surgery: ex-lap, no naif or pancreatic necrosectomy 2/2 profound 

inflammation.


4/28: Seen POD #1. Afebrile overnight. BUN 62. CBC WNL today.Remains on vent via

trach, TPN. Able to point today and indicate she wishes her daughters and Rolf 

to be involved in her care.


4/29: Hb 7.4, Na 151. Remains on vent via trach, TPN. off HD for now. Not 

tolerating trach shield well.


4/30: Negative US for UE DVT. More relaxed today. Has some increase in UOP. 

Tolerating vent well. She is requesting to eat and drink via writing. T max 100F

24 hours ago, but 101F at 1200. Right PICC placed. Speaking valve for half the 

day in [facility]


5/1: Na 153 today. Good UOP. Tmax 101F at 1200 on 4/30. She becomes a bit 

anxious and did not sleep much last night, wishes to try to sleep this morning


5/2: Able to speak well with speaking valve today. Na 153, K2.9, Mg 1.8, Cr 1. 

100.4F axillary temp overnight. Asking for food.


5/3: Afebrile overnight. ABG with pH 7.27/75/123. Hb 7.1. Na 153. PCA settings 

decreased


5/4: No acute events reported overnight, case discussed with nursing staff 

patient in no acute distress no complaints during my visit


5/5: Patient responding to verbal stimuli.  She is saturating well and not 

requiring ventilation support, no acute events reported overnight seems to be 

slowly improving


5/6: Patient requiring transfusion of packed red blood cells due to anemia, no 

evidence of acute bleeding, appreciate GI consultant recommendations





Plan:


Transfuse 1 unit of packed red blood cells


Encourage activity as tolerated


Trach shield and speaking valve during day when awake. Would recommend ABG after

4 hours to r/o CO2 retention, however and still vent overnight


Monitor fever curve, no obvious source of infection, possibly some aspiration 

events, atelectasis





Vitals


Vitals





Vital Signs








  Date Time  Temp Pulse Resp B/P (MAP) Pulse Ox O2 Delivery O2 Flow Rate FiO2


 


5/6/20 15:00  92 29 110/65 (80) 99 Ventilator  


 


5/6/20 14:01 98.4       





 98.4       


 


5/6/20 13:14       8.0 











Physical Exam


Physical Exam


GENERAL: Propped up in bed, Alert. weak appearing but not toxic. slight better


HEENT: Pupils equal, + NGT, oral cavity dry 


NECK:  Trach/vent 


LUNGS: rhonchi 


HEART:  S1, S2, regular 


ABDOMEN: Distended, hypoactive BS, drain placement (4/27 - serous fluid)


: Chino (4/14)


EXTREMITIES: Generalized edema, no cyanosis, SCDs bilaterally 


DERMATOLOGIC:  Warm and dry.  No generalized rash.  


CENTRAL NERVOUS SYSTEM: weak, mouthing words to simple questions 


PICC line in place April 30, 2020 clean


HDC has been removed


LIJ removed


General:  No acute distress


Heart:  Regular rate, No murmurs


Lungs:  Crackles


Abdomen:  Soft, Other (mild TTP, drain with serous output)


Extremities:  No edema


Skin:  Other (mottling noted to extremities )





Labs


LABS





Laboratory Tests








Test


 5/5/20


18:13 5/6/20


00:09 5/6/20


06:05 5/6/20


06:09


 


Glucose (Fingerstick)


 143 mg/dL


(70-99) 151 mg/dL


(70-99) 


 128 mg/dL


(70-99)


 


White Blood Count


 


 


 8.8 x10^3/uL


(4.0-11.0) 





 


Red Blood Count


 


 


 2.30 x10^6/uL


(3.50-5.40) 





 


Hemoglobin


 


 


 6.8 g/dL


(12.0-15.5) 





 


Hematocrit


 


 


 20.9 %


(36.0-47.0) 





 


Mean Corpuscular Volume   91 fL ()  


 


Mean Corpuscular Hemoglobin   29 pg (25-35)  


 


Mean Corpuscular Hemoglobin


Concent 


 


 32 g/dL


(31-37) 





 


Red Cell Distribution Width


 


 


 18.8 %


(11.5-14.5) 





 


Platelet Count


 


 


 416 x10^3/uL


(140-400) 





 


Neutrophils (%) (Auto)   70 % (31-73)  


 


Lymphocytes (%) (Auto)   21 % (24-48)  


 


Monocytes (%) (Auto)   6 % (0-9)  


 


Eosinophils (%) (Auto)   3 % (0-3)  


 


Basophils (%) (Auto)   0 % (0-3)  


 


Neutrophils # (Auto)


 


 


 6.1 x10^3/uL


(1.8-7.7) 





 


Lymphocytes # (Auto)


 


 


 1.9 x10^3/uL


(1.0-4.8) 





 


Monocytes # (Auto)


 


 


 0.5 x10^3/uL


(0.0-1.1) 





 


Eosinophils # (Auto)


 


 


 0.2 x10^3/uL


(0.0-0.7) 





 


Basophils # (Auto)


 


 


 0.0 x10^3/uL


(0.0-0.2) 





 


Sodium Level


 


 


 152 mmol/L


(136-145) 





 


Potassium Level


 


 


 4.3 mmol/L


(3.5-5.1) 





 


Chloride Level


 


 


 113 mmol/L


() 





 


Carbon Dioxide Level


 


 


 31 mmol/L


(21-32) 





 


Anion Gap   8 (6-14)  


 


Blood Urea Nitrogen


 


 


 38 mg/dL


(7-20) 





 


Creatinine


 


 


 0.9 mg/dL


(0.6-1.0) 





 


Estimated GFR


(Cockcroft-Gault) 


 


 66.5 


 





 


BUN/Creatinine Ratio   42 (6-20)  


 


Glucose Level


 


 


 137 mg/dL


(70-99) 





 


Calcium Level


 


 


 9.0 mg/dL


(8.5-10.1) 





 


Total Bilirubin


 


 


 0.4 mg/dL


(0.2-1.0) 





 


Aspartate Amino Transf


(AST/SGOT) 


 


 35 U/L (15-37) 


 





 


Alanine Aminotransferase


(ALT/SGPT) 


 


 30 U/L (14-59) 


 





 


Alkaline Phosphatase


 


 


 84 U/L


() 





 


Total Protein


 


 


 5.6 g/dL


(6.4-8.2) 





 


Albumin


 


 


 1.8 g/dL


(3.4-5.0) 





 


Albumin/Globulin Ratio   0.5 (1.0-1.7)  


 


Test


 5/6/20


08:20 5/6/20


13:00 


 





 


Hemoglobin


 6.4 g/dL


(12.0-15.5) 


 


 





 


Hematocrit


 20.1 %


(36.0-47.0) 


 


 





 


Mean Corpuscular Hemoglobin


Concent 32 g/dL


(31-37) 


 


 





 


Glucose (Fingerstick)


 


 156 mg/dL


(70-99) 


 














Assessment and Plan


Assessmemt and Plan


Problems


Medical Problems:


(1) Acute pancreatitis


Status: Acute  





(2) Cholelithiasis


Status: Acute  











Comment


Review of Relevant


I have reviewed the following items josy (where applicable) has been applied.


Labs





Laboratory Tests








Test


 5/4/20


18:17 5/5/20


00:21 5/5/20


06:35 5/5/20


06:38


 


Glucose (Fingerstick)


 145 mg/dL


(70-99) 153 mg/dL


(70-99) 


 102 mg/dL


(70-99)


 


White Blood Count


 


 


 7.3 x10^3/uL


(4.0-11.0) 





 


Red Blood Count


 


 


 2.55 x10^6/uL


(3.50-5.40) 





 


Hemoglobin


 


 


 7.3 g/dL


(12.0-15.5) 





 


Hematocrit


 


 


 23.2 %


(36.0-47.0) 





 


Mean Corpuscular Volume   91 fL ()  


 


Mean Corpuscular Hemoglobin   29 pg (25-35)  


 


Mean Corpuscular Hemoglobin


Concent 


 


 32 g/dL


(31-37) 





 


Red Cell Distribution Width


 


 


 18.5 %


(11.5-14.5) 





 


Platelet Count


 


 


 370 x10^3/uL


(140-400) 





 


Neutrophils (%) (Auto)   75 % (31-73)  


 


Lymphocytes (%) (Auto)   17 % (24-48)  


 


Monocytes (%) (Auto)   5 % (0-9)  


 


Eosinophils (%) (Auto)   3 % (0-3)  


 


Basophils (%) (Auto)   1 % (0-3)  


 


Neutrophils # (Auto)


 


 


 5.5 x10^3/uL


(1.8-7.7) 





 


Lymphocytes # (Auto)


 


 


 1.2 x10^3/uL


(1.0-4.8) 





 


Monocytes # (Auto)


 


 


 0.4 x10^3/uL


(0.0-1.1) 





 


Eosinophils # (Auto)


 


 


 0.2 x10^3/uL


(0.0-0.7) 





 


Basophils # (Auto)


 


 


 0.0 x10^3/uL


(0.0-0.2) 





 


Sodium Level


 


 


 153 mmol/L


(136-145) 





 


Potassium Level


 


 


 4.1 mmol/L


(3.5-5.1) 





 


Chloride Level


 


 


 114 mmol/L


() 





 


Carbon Dioxide Level


 


 


 33 mmol/L


(21-32) 





 


Anion Gap   6 (6-14)  


 


Blood Urea Nitrogen


 


 


 41 mg/dL


(7-20) 





 


Creatinine


 


 


 0.9 mg/dL


(0.6-1.0) 





 


Estimated GFR


(Cockcroft-Gault) 


 


 66.5 


 





 


Glucose Level


 


 


 104 mg/dL


(70-99) 





 


Calcium Level


 


 


 8.6 mg/dL


(8.5-10.1) 





 


Phosphorus Level


 


 


 3.2 mg/dL


(2.6-4.7) 





 


Magnesium Level


 


 


 2.0 mg/dL


(1.8-2.4) 





 


Test


 5/5/20


12:18 5/5/20


18:13 5/6/20


00:09 5/6/20


06:05


 


Glucose (Fingerstick)


 147 mg/dL


(70-99) 143 mg/dL


(70-99) 151 mg/dL


(70-99) 





 


White Blood Count


 


 


 


 8.8 x10^3/uL


(4.0-11.0)


 


Red Blood Count


 


 


 


 2.30 x10^6/uL


(3.50-5.40)


 


Hemoglobin


 


 


 


 6.8 g/dL


(12.0-15.5)


 


Hematocrit


 


 


 


 20.9 %


(36.0-47.0)


 


Mean Corpuscular Volume    91 fL () 


 


Mean Corpuscular Hemoglobin    29 pg (25-35) 


 


Mean Corpuscular Hemoglobin


Concent 


 


 


 32 g/dL


(31-37)


 


Red Cell Distribution Width


 


 


 


 18.8 %


(11.5-14.5)


 


Platelet Count


 


 


 


 416 x10^3/uL


(140-400)


 


Neutrophils (%) (Auto)    70 % (31-73) 


 


Lymphocytes (%) (Auto)    21 % (24-48) 


 


Monocytes (%) (Auto)    6 % (0-9) 


 


Eosinophils (%) (Auto)    3 % (0-3) 


 


Basophils (%) (Auto)    0 % (0-3) 


 


Neutrophils # (Auto)


 


 


 


 6.1 x10^3/uL


(1.8-7.7)


 


Lymphocytes # (Auto)


 


 


 


 1.9 x10^3/uL


(1.0-4.8)


 


Monocytes # (Auto)


 


 


 


 0.5 x10^3/uL


(0.0-1.1)


 


Eosinophils # (Auto)


 


 


 


 0.2 x10^3/uL


(0.0-0.7)


 


Basophils # (Auto)


 


 


 


 0.0 x10^3/uL


(0.0-0.2)


 


Sodium Level


 


 


 


 152 mmol/L


(136-145)


 


Potassium Level


 


 


 


 4.3 mmol/L


(3.5-5.1)


 


Chloride Level


 


 


 


 113 mmol/L


()


 


Carbon Dioxide Level


 


 


 


 31 mmol/L


(21-32)


 


Anion Gap    8 (6-14) 


 


Blood Urea Nitrogen


 


 


 


 38 mg/dL


(7-20)


 


Creatinine


 


 


 


 0.9 mg/dL


(0.6-1.0)


 


Estimated GFR


(Cockcroft-Gault) 


 


 


 66.5 





 


BUN/Creatinine Ratio    42 (6-20) 


 


Glucose Level


 


 


 


 137 mg/dL


(70-99)


 


Calcium Level


 


 


 


 9.0 mg/dL


(8.5-10.1)


 


Total Bilirubin


 


 


 


 0.4 mg/dL


(0.2-1.0)


 


Aspartate Amino Transf


(AST/SGOT) 


 


 


 35 U/L (15-37) 





 


Alanine Aminotransferase


(ALT/SGPT) 


 


 


 30 U/L (14-59) 





 


Alkaline Phosphatase


 


 


 


 84 U/L


()


 


Total Protein


 


 


 


 5.6 g/dL


(6.4-8.2)


 


Albumin


 


 


 


 1.8 g/dL


(3.4-5.0)


 


Albumin/Globulin Ratio    0.5 (1.0-1.7) 


 


Test


 5/6/20


06:09 5/6/20


08:20 5/6/20


13:00 





 


Glucose (Fingerstick)


 128 mg/dL


(70-99) 


 156 mg/dL


(70-99) 





 


Hemoglobin


 


 6.4 g/dL


(12.0-15.5) 


 





 


Hematocrit


 


 20.1 %


(36.0-47.0) 


 





 


Mean Corpuscular Hemoglobin


Concent 


 32 g/dL


(31-37) 


 











Laboratory Tests








Test


 5/5/20


18:13 5/6/20


00:09 5/6/20


06:05 5/6/20


06:09


 


Glucose (Fingerstick)


 143 mg/dL


(70-99) 151 mg/dL


(70-99) 


 128 mg/dL


(70-99)


 


White Blood Count


 


 


 8.8 x10^3/uL


(4.0-11.0) 





 


Red Blood Count


 


 


 2.30 x10^6/uL


(3.50-5.40) 





 


Hemoglobin


 


 


 6.8 g/dL


(12.0-15.5) 





 


Hematocrit


 


 


 20.9 %


(36.0-47.0) 





 


Mean Corpuscular Volume   91 fL ()  


 


Mean Corpuscular Hemoglobin   29 pg (25-35)  


 


Mean Corpuscular Hemoglobin


Concent 


 


 32 g/dL


(31-37) 





 


Red Cell Distribution Width


 


 


 18.8 %


(11.5-14.5) 





 


Platelet Count


 


 


 416 x10^3/uL


(140-400) 





 


Neutrophils (%) (Auto)   70 % (31-73)  


 


Lymphocytes (%) (Auto)   21 % (24-48)  


 


Monocytes (%) (Auto)   6 % (0-9)  


 


Eosinophils (%) (Auto)   3 % (0-3)  


 


Basophils (%) (Auto)   0 % (0-3)  


 


Neutrophils # (Auto)


 


 


 6.1 x10^3/uL


(1.8-7.7) 





 


Lymphocytes # (Auto)


 


 


 1.9 x10^3/uL


(1.0-4.8) 





 


Monocytes # (Auto)


 


 


 0.5 x10^3/uL


(0.0-1.1) 





 


Eosinophils # (Auto)


 


 


 0.2 x10^3/uL


(0.0-0.7) 





 


Basophils # (Auto)


 


 


 0.0 x10^3/uL


(0.0-0.2) 





 


Sodium Level


 


 


 152 mmol/L


(136-145) 





 


Potassium Level


 


 


 4.3 mmol/L


(3.5-5.1) 





 


Chloride Level


 


 


 113 mmol/L


() 





 


Carbon Dioxide Level


 


 


 31 mmol/L


(21-32) 





 


Anion Gap   8 (6-14)  


 


Blood Urea Nitrogen


 


 


 38 mg/dL


(7-20) 





 


Creatinine


 


 


 0.9 mg/dL


(0.6-1.0) 





 


Estimated GFR


(Cockcroft-Gault) 


 


 66.5 


 





 


BUN/Creatinine Ratio   42 (6-20)  


 


Glucose Level


 


 


 137 mg/dL


(70-99) 





 


Calcium Level


 


 


 9.0 mg/dL


(8.5-10.1) 





 


Total Bilirubin


 


 


 0.4 mg/dL


(0.2-1.0) 





 


Aspartate Amino Transf


(AST/SGOT) 


 


 35 U/L (15-37) 


 





 


Alanine Aminotransferase


(ALT/SGPT) 


 


 30 U/L (14-59) 


 





 


Alkaline Phosphatase


 


 


 84 U/L


() 





 


Total Protein


 


 


 5.6 g/dL


(6.4-8.2) 





 


Albumin


 


 


 1.8 g/dL


(3.4-5.0) 





 


Albumin/Globulin Ratio   0.5 (1.0-1.7)  


 


Test


 5/6/20


08:20 5/6/20


13:00 


 





 


Hemoglobin


 6.4 g/dL


(12.0-15.5) 


 


 





 


Hematocrit


 20.1 %


(36.0-47.0) 


 


 





 


Mean Corpuscular Hemoglobin


Concent 32 g/dL


(31-37) 


 


 





 


Glucose (Fingerstick)


 


 156 mg/dL


(70-99) 


 











Microbiology


5/4/20 Blood Culture - Preliminary, Resulted


         NO GROWTH AFTER 2 DAYS


4/30/20 Aerobic Culture - Final, Complete


          


4/30/20 Aerobic Culture Result 1 (ANSON) - Final, Complete


          


4/30/20 Gram Stain - Final, Complete


          


4/30/20 Gram Stain Result 1 (ANSON) - Final, Complete


          


4/30/20 Gram Stain Result 2 (ANSON) - Final, Complete


          


4/27/20 Aerobic and Anaerobic Culture - Final, Complete


          


4/27/20 Anaerobic Culture Result 1 (ANSON) - Final, Complete


          


4/27/20 Aerobic Culture - Final, Complete


          


4/27/20 Aerobic Culture Result 1 (ANSON) - Final, Complete


          


4/27/20 Gram Stain - Final, Complete


          


4/27/20 Gram Stain Result 1 (ANSON) - Final, Complete


          


4/27/20 Gram Stain Result 2 (ANSON) - Final, Complete


          


4/12/20 Urine Culture - Final, Complete


          


4/12/20 Urine Culture Result 1 (ANSON) - Final, Complete


Medications





Current Medications


Sodium Chloride 1,000 ml @  1,000 mls/hr Q1H IV  Last administered on 3/16/20at 

03:00;  Start 3/16/20 at 03:00;  Stop 3/16/20 at 03:59;  Status DC


Ondansetron HCl (Zofran) 4 mg 1X  ONCE IVP  Last administered on 3/16/20at 

03:27;  Start 3/16/20 at 03:00;  Stop 3/16/20 at 03:01;  Status DC


Morphine Sulfate (Morphine Sulfate) 4 mg 1X  ONCE IV ;  Start 3/16/20 at 03:00; 

Stop 3/16/20 at 03:01;  Status Cancel


Ketorolac Tromethamine (Toradol 30mg Vial) 30 mg 1X  ONCE IV  Last administered 

on 3/16/20at 02:54;  Start 3/16/20 at 03:00;  Stop 3/16/20 at 03:01;  Status DC


Fentanyl Citrate (Fentanyl 2ml Vial) 25 mcg 1X  ONCE IVP  Last administered on 

3/16/20at 03:23;  Start 3/16/20 at 03:30;  Stop 3/16/20 at 03:31;  Status DC


Fentanyl Citrate (Fentanyl 2ml Vial) 100 mcg STK-MED ONCE .ROUTE ;  Start 

3/16/20 at 03:18;  Stop 3/16/20 at 03:18;  Status DC


Iohexol (Omnipaque 350 Mg/ml) 90 ml 1X  ONCE IV  Last administered on 3/16/20at 

03:25;  Start 3/16/20 at 03:30;  Stop 3/16/20 at 03:31;  Status DC


Info (CONTRAST GIVEN -- Rx MONITORING) 1 each PRN DAILY  PRN MC SEE COMMENTS;  

Start 3/16/20 at 03:30;  Stop 3/18/20 at 03:29;  Status DC


Hydromorphone HCl (Dilaudid) 0.5 mg 1X  ONCE IV  Last administered on 3/16/20at 

03:55;  Start 3/16/20 at 04:30;  Stop 3/16/20 at 04:32;  Status DC


Ondansetron HCl (Zofran) 4 mg PRN Q8HRS  PRN IV NAUSEA/VOMITING 1ST CHOICE;  

Start 3/16/20 at 05:00;  Stop 3/16/20 at 09:27;  Status DC


Morphine Sulfate (Morphine Sulfate) 2 mg PRN Q2HR  PRN IV SEVERE PAIN 7-10 Last 

administered on 3/17/20at 12:26;  Start 3/16/20 at 05:00;  Stop 3/17/20 at 

14:15;  Status DC


Sodium Chloride 1,000 ml @  125 mls/hr Q8H IV  Last administered on 3/16/20at 

20:56;  Start 3/16/20 at 05:00;  Stop 3/17/20 at 04:59;  Status DC


Hydromorphone HCl (Dilaudid) 0.5 mg PRN Q3HRS  PRN IV SEVERE PAIN 7-10 Last 

administered on 3/17/20at 10:06;  Start 3/16/20 at 05:00;  Stop 3/17/20 at 

12:01;  Status DC


Piperacillin Sod/ Tazobactam Sod 4.5 gm/Sodium Chloride 100 ml @  200 mls/hr 1X 

ONCE IV  Last administered on 3/16/20at 05:44;  Start 3/16/20 at 06:00;  Stop 

3/16/20 at 06:29;  Status DC


Ondansetron HCl (Zofran) 4 mg PRN Q4HRS  PRN IV NAUSEA/VOMITING 1ST CHOICE Last 

administered on 5/5/20at 15:33;  Start 3/16/20 at 09:30


Insulin Human Lispro (HumaLOG) 0-9 UNITS Q6HRS SQ  Last administered on 5/6/20at

13:12;  Start 3/16/20 at 09:30


Dextrose (Dextrose 50%-Water Syringe) 12.5 gm PRN Q15MIN  PRN IV SEE COMMENTS;  

Start 3/16/20 at 09:30


Pantoprazole Sodium (PROTONIX VIAL for IV PUSH) 40 mg DAILYAC IVP  Last 

administered on 5/6/20at 08:36;  Start 3/16/20 at 11:30


Prochlorperazine Edisylate (Compazine) 10 mg PRN Q6HRS  PRN IV NAUSEA/VOMITING, 

2nd CHOICE Last administered on 5/4/20at 06:06;  Start 3/16/20 at 17:45


Atenolol (Tenormin) 100 mg DAILY PO ;  Start 3/17/20 at 09:00;  Stop 3/16/20 at 

20:08;  Status DC


Metoprolol Tartrate (Lopressor Vial) 2.5 mg Q6HRS IVP  Last administered on 

3/17/20at 05:51;  Start 3/16/20 at 20:15;  Stop 3/17/20 at 10:02;  Status DC


Metoprolol Tartrate (Lopressor Vial) 5 mg Q6HRS IVP  Last administered on 

3/26/20at 00:12;  Start 3/17/20 at 10:15;  Stop 3/28/20 at 08:48;  Status DC


Hydromorphone HCl (Dilaudid) 1 mg PRN Q3HRS  PRN IV SEVERE PAIN 7-10 Last 

administered on 3/23/20at 05:13;  Start 3/17/20 at 12:00;  Stop 3/31/20 at 

00:25;  Status DC


Lidocaine HCl (Buffered Lidocaine 1%) 3 ml STK-MED ONCE .ROUTE ;  Start 3/17/20 

at 12:55;  Stop 3/17/20 at 12:56;  Status DC


Albumin Human 500 ml @  125 mls/hr 1X  ONCE IV  Last administered on 3/17/20at 

14:33;  Start 3/17/20 at 14:30;  Stop 3/17/20 at 18:32;  Status DC


Norepinephrine Bitartrate 8 mg/ Dextrose 258 ml @  17.299 mls/ hr CONT  PRN IV 

PER PROTOCOL Last administered on 4/14/20at 12:48;  Start 3/17/20 at 15:30;  

Stop 4/17/20 at 09:19;  Status DC


Sodium Chloride 1,000 ml @  125 mls/hr Q8H IV  Last administered on 3/17/20at 

21:04;  Start 3/17/20 at 16:00;  Stop 3/18/20 at 02:42;  Status DC


Albumin Human 500 ml @  125 mls/hr PRN BID  PRN IV After every 2L NSS & BP < 

90mm Last administered on 4/1/20at 14:21;  Start 3/17/20 at 16:00


Iohexol (Omnipaque 300 Mg/ml) 60 ml 1X  ONCE IV  Last administered on 3/17/20at 

17:20;  Start 3/17/20 at 17:00;  Stop 3/17/20 at 17:01;  Status DC


Info (CONTRAST GIVEN -- Rx MONITORING) 1 each PRN DAILY  PRN MC SEE COMMENTS;  S

tart 3/17/20 at 17:00;  Stop 3/19/20 at 16:59;  Status DC


Meropenem 1 gm/ Sodium Chloride 100 ml @  200 mls/hr Q8HRS IV  Last administered

on 3/18/20at 05:45;  Start 3/17/20 at 20:00;  Stop 3/18/20 at 08:48;  Status DC


Furosemide (Lasix) 40 mg 1X  ONCE IVP  Last administered on 3/17/20at 22:12;  

Start 3/17/20 at 22:30;  Stop 3/17/20 at 22:31;  Status DC


Calcium Chloride 1000 mg/Sodium Chloride 110 ml @  220 mls/hr 1X  ONCE IV  Last 

administered on 3/17/20at 22:11;  Start 3/17/20 at 22:30;  Stop 3/17/20 at 2

2:59;  Status DC


Albuterol Sulfate (Ventolin Neb Soln) 2.5 mg 1X  ONCE NEB  Last administered on 

3/18/20at 00:56;  Start 3/17/20 at 22:30;  Stop 3/17/20 at 22:31;  Status DC


Insulin Human Regular (HumuLIN R VIAL) 5 unit 1X  ONCE IV  Last administered on 

3/17/20at 22:14;  Start 3/17/20 at 22:30;  Stop 3/17/20 at 22:31;  Status DC


Magnesium Sulfate 50 ml @ 25 mls/hr 1X  ONCE IV  Last administered on 3/18/20at 

02:57;  Start 3/18/20 at 03:00;  Stop 3/18/20 at 04:59;  Status DC


Calcium Gluconate 1000 mg/Sodium Chloride 110 ml @  220 mls/hr 1X  ONCE IV  Last

administered on 3/18/20at 02:46;  Start 3/18/20 at 03:00;  Stop 3/18/20 at 

03:29;  Status DC


Sodium Chloride 1,000 ml @  200 mls/hr Q5H IV  Last administered on 3/18/20at 

02:46;  Start 3/18/20 at 03:00;  Stop 3/18/20 at 10:21;  Status DC


Calcium Gluconate 1000 mg/Sodium Chloride 110 ml @  220 mls/hr 1X  ONCE IV  Last

administered on 3/18/20at 03:21;  Start 3/18/20 at 03:30;  Stop 3/18/20 at 

03:59;  Status DC


Sodium Bicarbonate 50 meq/Sodium Chloride 1,050 ml @  75 mls/hr Q14H IV  Last 

administered on 3/22/20at 21:10;  Start 3/18/20 at 07:30;  Stop 3/23/20 at 

10:28;  Status DC


Calcium Gluconate 2000 mg/Sodium Chloride 120 ml @  220 mls/hr 1X  ONCE IV  Last

administered on 3/18/20at 09:05;  Start 3/18/20 at 07:30;  Stop 3/18/20 at 

08:02;  Status DC


Lidocaine HCl (Xylocaine-Mpf 1% 2ml Vial) 2 ml STK-MED ONCE .ROUTE ;  Start 

3/18/20 at 08:47;  Stop 3/18/20 at 08:47;  Status DC


Meropenem 500 mg/ Sodium Chloride 50 ml @  100 mls/hr Q12HR IV  Last 

administered on 3/23/20at 21:01;  Start 3/18/20 at 18:00;  Stop 3/24/20 at 

07:58;  Status DC


Lidocaine HCl (Buffered Lidocaine 1%) 3 ml STK-MED ONCE .ROUTE ;  Start 3/18/20 

at 09:46;  Stop 3/18/20 at 09:46;  Status DC


Lidocaine HCl (Buffered Lidocaine 1%) 6 ml 1X  ONCE INJ  Last administered on 

3/18/20at 10:26;  Start 3/18/20 at 10:15;  Stop 3/18/20 at 10:16;  Status DC


Info (Tpn Per Pharmacy) 1 each PRN DAILY  PRN MC SEE COMMENTS Last administered 

on 5/6/20at 13:45;  Start 3/18/20 at 12:00


Sodium Chloride 1,000 ml @  1,000 mls/hr Q1H PRN IV hypotension;  Start 3/18/20 

at 12:07;  Stop 3/18/20 at 18:06;  Status DC


Diphenhydramine HCl (Benadryl) 25 mg 1X PRN  PRN IV ITCHING;  Start 3/18/20 at 

12:15;  Stop 3/19/20 at 12:14;  Status DC


Diphenhydramine HCl (Benadryl) 25 mg 1X PRN  PRN IV ITCHING;  Start 3/18/20 at 

12:15;  Stop 3/19/20 at 12:14;  Status DC


Sodium Chloride 1,000 ml @  400 mls/hr Q2H30M PRN IV PATENCY;  Start 3/18/20 at 

12:07;  Stop 3/19/20 at 00:06;  Status DC


Info (PHARMACY MONITORING -- do not chart) 1 each PRN DAILY  PRN MC SEE 

COMMENTS;  Start 3/18/20 at 12:15;  Stop 3/20/20 at 08:13;  Status DC


Sodium Chloride 90 meq/Calcium Gluconate 10 meq/ Multivitamins 10 ml/Chromium/ 

Copper/Manganese/ Seleni/Zn 1 ml/ Total Parenteral Nutrition/Amino 

Acids/Dextrose/ Fat Emulsion Intravenous 55.005 ml  @ 2.292 mls/hr TPN  CONT IV 

;  Start 3/18/20 at 22:00;  Stop 3/18/20 at 12:33;  Status DC


Info (Tpn Per Pharmacy) 1 each PRN DAILY  PRN MC SEE COMMENTS;  Start 3/18/20 at

12:30;  Status UNV


Sodium Chloride 90 meq/Calcium Gluconate 10 meq/ Multivitamins 10 ml/Chromium/ 

Copper/Manganese/ Seleni/Zn 0.5 ml/ Total Parenteral Nutrition/Amino 

Acids/Dextrose/ Fat Emulsion Intravenous 1,512 ml @  63 mls/hr TPN  CONT IV  

Last administered on 3/18/20at 22:06;  Start 3/18/20 at 22:00;  Stop 3/19/20 at 

21:59;  Status DC


Calcium Carbonate/ Glycine (Tums) 500 mg PRN AFTMEALHC  PRN PO INDIGESTION;  

Start 3/18/20 at 17:45


Calcium Gluconate (Calcium Gluconate) 2,000 mg 1X  ONCE IVP  Last administered 

on 3/19/20at 02:19;  Start 3/19/20 at 02:15;  Stop 3/19/20 at 02:16;  Status DC


Calcium Chloride 3000 mg/Sodium Chloride 1,030 ml @  50 mls/hr [id number] IV  Last 

administered on 3/21/20at 02:17;  Start 3/19/20 at 08:00;  Stop 3/21/20 at 

15:23;  Status DC


Lorazepam (Ativan Inj) 1 mg PRN Q4HRS  PRN IVP ANXIETY / AGITATION, 2nd choic 

Last administered on 4/17/20at 03:51;  Start 3/19/20 at 09:00;  Stop 4/17/20 at 

09:19;  Status DC


Sodium Chloride 1,000 ml @  1,000 mls/hr Q1H PRN IV hypotension;  Start 3/19/20 

at 08:56;  Stop 3/19/20 at 14:55;  Status DC


Albumin Human 200 ml @  200 mls/hr 1X PRN  PRN IV Hypotension;  Start 3/19/20 at

09:00;  Stop 3/19/20 at 14:59;  Status DC


Diphenhydramine HCl (Benadryl) 25 mg 1X PRN  PRN IV ITCHING;  Start 3/19/20 at 

09:00;  Stop 3/20/20 at 08:59;  Status DC


Diphenhydramine HCl (Benadryl) 25 mg 1X PRN  PRN IV ITCHING;  Start 3/19/20 at 

09:00;  Stop 3/20/20 at 08:59;  Status DC


Sodium Chloride 1,000 ml @  400 mls/hr Q2H30M PRN IV PATENCY;  Start 3/19/20 at 

08:56;  Stop 3/19/20 at 20:55;  Status DC


Info (PHARMACY MONITORING -- do not chart) 1 each PRN DAILY  PRN MC SEE 

COMMENTS;  Start 3/19/20 at 09:00;  Status UNV


Info (PHARMACY MONITORING -- do not chart) 1 each PRN DAILY  PRN MC SEE 

COMMENTS;  Start 3/19/20 at 09:00;  Stop 3/20/20 at 08:13;  Status DC


Digoxin (Lanoxin) 500 mcg 1X  ONCE IV  Last administered on 3/19/20at 10:04;  

Start 3/19/20 at 10:00;  Stop 3/19/20 at 10:01;  Status DC


Digoxin (Lanoxin) 125 mcg 1X  ONCE IV  Last administered on 3/19/20at 17:10;  

Start 3/19/20 at 18:00;  Stop 3/19/20 at 18:01;  Status DC


Magnesium Sulfate 100 ml @  25 mls/hr 1X  ONCE IV  Last administered on 

3/19/20at 12:48;  Start 3/19/20 at 13:00;  Stop 3/19/20 at 16:59;  Status DC


Sodium Chloride 90 meq/Magnesium Sulfate 10 meq/ Calcium Gluconate 20 meq/ 

Multivitamins 10 ml/Chromium/ Copper/Manganese/ Seleni/Zn 0.5 ml/ Total 

Parenteral Nutrition/Amino Acids/Dextrose/ Fat Emulsion Intravenous 1,512 ml @  

63 mls/hr TPN  CONT IV  Last administered on 3/19/20at 22:25;  Start 3/19/20 at 

22:00;  Stop 3/20/20 at 21:59;  Status DC


Sodium Chloride 1,000 ml @  1,000 mls/hr Q1H PRN IV hypotension;  Start 3/20/20 

at 08:05;  Stop 3/20/20 at 14:04;  Status DC


Albumin Human 200 ml @  200 mls/hr 1X  ONCE IV  Last administered on 3/20/20at 

08:57;  Start 3/20/20 at 08:15;  Stop 3/20/20 at 09:14;  Status DC


Diphenhydramine HCl (Benadryl) 25 mg 1X PRN  PRN IV ITCHING;  Start 3/20/20 at 

08:15;  Stop 3/21/20 at 08:14;  Status DC


Diphenhydramine HCl (Benadryl) 25 mg 1X PRN  PRN IV ITCHING;  Start 3/20/20 at 

08:15;  Stop 3/21/20 at 08:14;  Status DC


Sodium Chloride 1,000 ml @  400 mls/hr Q2H30M PRN IV PATENCY;  Start 3/20/20 at 

08:05;  Stop 3/20/20 at 20:04;  Status DC


Info (PHARMACY MONITORING -- do not chart) 1 each PRN DAILY  PRN MC SEE 

COMMENTS;  Start 3/20/20 at 08:15;  Stop 3/24/20 at 07:57;  Status DC


Sodium Chloride 90 meq/Potassium Chloride 15 meq/ Potassium Phosphate 10 mmol/ 

Magnesium Sulfate 10 meq/Calcium Gluconate 20 meq/ Multivitamins 10 ml/Chromium/

Copper/Manganese/ Seleni/Zn 0.5 ml/ Total Parenteral Nutrition/Amino 

Acids/Dextrose/ Fat Emulsion Intravenous 1,512 ml @  63 mls/hr TPN  CONT IV  

Last administered on 3/20/20at 21:01;  Start 3/20/20 at 22:00;  Stop 3/21/20 at 

21:59;  Status DC


Potassium Chloride/Water 100 ml @  100 mls/hr 1X  ONCE IV  Last administered on 

3/20/20at 14:09;  Start 3/20/20 at 14:00;  Stop 3/20/20 at 14:59;  Status DC


Benzocaine (Hurricaine One) 1 spray 1X  ONCE MM  Last administered on 3/20/20at 

16:38;  Start 3/20/20 at 14:30;  Stop 3/20/20 at 14:31;  Status DC


Lidocaine HCl (Glydo (Lidocaine) Jelly) 1 thomas 1X  ONCE MM  Last administered on 

3/20/20at 16:38;  Start 3/20/20 at 14:30;  Stop 3/20/20 at 14:31;  Status DC


Linezolid/Dextrose 300 ml @  300 mls/hr Q12HR IV  Last administered on 3/26/20at

21:04;  Start 3/20/20 at 20:00;  Stop 3/27/20 at 07:50;  Status DC


Acetaminophen (Tylenol) 650 mg PRN Q6HRS  PRN PO MILD PAIN / TEMP;  Start 

3/21/20 at 03:30;  Stop 3/21/20 at 03:36;  Status DC


Acetaminophen (Tylenol) 650 mg PRN Q6HRS  PRN PEG MILD PAIN / TEMP Last 

administered on 4/16/20at 19:56;  Start 3/21/20 at 03:36


Sodium Chloride 1,000 ml @  1,000 mls/hr Q1H PRN IV hypotension;  Start 3/21/20 

at 07:50;  Stop 3/21/20 at 13:49;  Status DC


Albumin Human 200 ml @  200 mls/hr 1X PRN  PRN IV Hypotension;  Start 3/21/20 at

08:00;  Stop 3/21/20 at 13:59;  Status DC


Sodium Chloride (Normal Saline Flush) 10 ml 1X PRN  PRN IV AP catheter pack;  

Start 3/21/20 at 08:00;  Stop 3/22/20 at 07:59;  Status DC


Sodium Chloride (Normal Saline Flush) 10 ml 1X PRN  PRN IV  catheter pack;  

Start 3/21/20 at 08:00;  Stop 3/22/20 at 07:59;  Status DC


Sodium Chloride 1,000 ml @  400 mls/hr Q2H30M PRN IV PATENCY;  Start 3/21/20 at 

07:50;  Stop 3/21/20 at 19:49;  Status DC


Info (PHARMACY MONITORING -- do not chart) 1 each PRN DAILY  PRN MC SEE 

COMMENTS;  Start 3/21/20 at 08:00;  Status UNV


Info (PHARMACY MONITORING -- do not chart) 1 each PRN DAILY  PRN MC SEE 

COMMENTS;  Start 3/21/20 at 08:00;  Stop 3/23/20 at 08:25;  Status DC


Sodium Chloride 90 meq/Potassium Chloride 15 meq/ Potassium Phosphate 10 mmol/ 

Magnesium Sulfate 10 meq/Calcium Gluconate 20 meq/ Multivitamins 10 ml/Chromium/

Copper/Manganese/ Seleni/Zn 0.5 ml/ Total Parenteral Nutrition/Amino 

Acids/Dextrose/ Fat Emulsion Intravenous 1,512 ml @  63 mls/hr TPN  CONT IV  

Last administered on 3/21/20at 20:57;  Start 3/21/20 at 22:00;  Stop 3/22/20 at 

21:59;  Status DC


Sodium Chloride 90 meq/Potassium Chloride 15 meq/ Potassium Phosphate 15 mmol/ 

Magnesium Sulfate 10 meq/Calcium Gluconate 20 meq/ Multivitamins 10 ml/Chromium/

Copper/Manganese/ Seleni/Zn 0.5 ml/ Total Parenteral Nutrition/Amino 

Acids/Dextrose/ Fat Emulsion Intravenous 1,512 ml @  63 mls/hr TPN  CONT IV ;  

Start 3/22/20 at 22:00;  Stop 3/22/20 at 14:16;  Status DC


Sodium Chloride 90 meq/Potassium Chloride 15 meq/ Potassium Phosphate 15 mmol/ 

Magnesium Sulfate 10 meq/Calcium Gluconate 20 meq/ Multivitamins 10 ml/Chromium/

Copper/Manganese/ Seleni/Zn 0.5 ml/ Total Parenteral Nutrition/Amino 

Acids/Dextrose/ Fat Emulsion Intravenous 1,200 ml @  50 mls/hr TPN  CONT IV ;  

Start 3/22/20 at 22:00;  Stop 3/22/20 at 14:17;  Status DC


Sodium Chloride 90 meq/Potassium Chloride 15 meq/ Potassium Phosphate 10 mmol/ 

Magnesium Sulfate 10 meq/Calcium Gluconate 20 meq/ Multivitamins 10 ml/Chromium/

Copper/Manganese/ Seleni/Zn 0.5 ml/ Total Parenteral Nutrition/Amino 

Acids/Dextrose/ Fat Emulsion Intravenous 1,200 ml @  50 mls/hr TPN  CONT IV  

Last administered on 3/22/20at 23:29;  Start 3/22/20 at 22:00;  Stop 3/23/20 at 

21:59;  Status DC


Sodium Chloride 1,000 ml @  1,000 mls/hr Q1H PRN IV hypotension;  Start 3/23/20 

at 07:28;  Stop 3/23/20 at 13:27;  Status DC


Albumin Human 200 ml @  200 mls/hr 1X  ONCE IV  Last administered on 3/23/20at 

08:51;  Start 3/23/20 at 07:30;  Stop 3/23/20 at 08:29;  Status DC


Diphenhydramine HCl (Benadryl) 25 mg 1X PRN  PRN IV ITCHING;  Start 3/23/20 at 

07:30;  Stop 3/24/20 at 07:29;  Status DC


Diphenhydramine HCl (Benadryl) 25 mg 1X PRN  PRN IV ITCHING;  Start 3/23/20 at 

07:30;  Stop 3/24/20 at 07:29;  Status DC


Sodium Chloride 1,000 ml @  400 mls/hr Q2H30M PRN IV PATENCY;  Start 3/23/20 at 

07:28;  Stop 3/23/20 at 19:27;  Status DC


Info (PHARMACY MONITORING -- do not chart) 1 each PRN DAILY  PRN MC SEE 

COMMENTS;  Start 3/23/20 at 07:30;  Stop 4/3/20 at 13:01;  Status DC


Metronidazole 100 ml @  100 mls/hr Q6HRS IV  Last administered on 4/8/20at 

06:26;  Start 3/23/20 at 08:30;  Stop 4/8/20 at 09:58;  Status DC


Micafungin Sodium 100 mg/Dextrose 100 ml @  100 mls/hr Q24H IV  Last 

administered on 4/30/20at 08:18;  Start 3/23/20 at 09:00;  Stop 4/30/20 at 

20:58;  Status DC


Propofol 0 ml @ As Directed STK-MED ONCE IV ;  Start 3/23/20 at 07:53;  Stop 

3/23/20 at 07:53;  Status DC


Etomidate (Amidate) 20 mg STK-MED ONCE IV ;  Start 3/23/20 at 07:53;  Stop 

3/23/20 at 07:54;  Status DC


Midazolam HCl (Versed) 5 mg STK-MED ONCE .ROUTE ;  Start 3/23/20 at 07:57;  Stop

3/23/20 at 07:57;  Status DC


Fentanyl Citrate 30 ml @ 0 mls/hr CONT  PRN IV SEE PROTOCOL Last administered on

4/17/20at 06:12;  Start 3/23/20 at 08:15;  Stop 4/17/20 at 09:19;  Status DC


Artificial Tears (Artificial Tears) 1 drop PRN Q1HR  PRN OU DRY EYE, 1st choice;

 Start 3/23/20 at 08:15;  Stop 4/29/20 at 05:31;  Status DC


Midazolam HCl 50 mg/Sodium Chloride 50 ml @ 0 mls/hr CONT  PRN IV SEE PROTOCOL 

Last administered on 3/26/20at 22:39;  Start 3/23/20 at 08:15;  Stop 3/28/20 at 

15:59;  Status DC


Etomidate (Amidate) 8 mg 1X  ONCE IV  Last administered on 3/23/20at 08:33;  

Start 3/23/20 at 08:30;  Stop 3/23/20 at 08:31;  Status DC


Succinylcholine Chloride (Anectine) 120 mg 1X  ONCE IV  Last administered on 

3/23/20at 08:34;  Start 3/23/20 at 08:30;  Stop 3/23/20 at 08:31;  Status DC


Midazolam HCl (Versed) 5 mg 1X  ONCE IV ;  Start 3/23/20 at 08:30;  Stop 3/23/20

at 08:31;  Status DC


Potassium Chloride 15 meq/ Bicarbonate Dialysis Soln w/ out KCl 5,007.5 ml  @ 

1,000 mls/ hr Q5H1M IV  Last administered on 3/24/20at 11:11;  Start 3/23/20 at 

12:00;  Stop 3/24/20 at 11:15;  Status DC


Potassium Chloride 15 meq/ Bicarbonate Dialysis Soln w/ out KCl 5,007.5 ml  @ 

1,000 mls/ hr Q5H1M IV  Last administered on 3/24/20at 11:12;  Start 3/23/20 at 

12:00;  Stop 3/24/20 at 11:17;  Status DC


Potassium Chloride 15 meq/ Bicarbonate Dialysis Soln w/ out KCl 5,007.5 ml  @ 

1,000 mls/ hr Q5H1M IV  Last administered on 3/24/20at 11:11;  Start 3/23/20 at 

12:00;  Stop 3/24/20 at 11:19;  Status DC


Sodium Chloride 90 meq/Potassium Chloride 15 meq/ Potassium Phosphate 10 mmol/ 

Magnesium Sulfate 10 meq/Calcium Gluconate 20 meq/ Multivitamins 10 ml/Chromium/

Copper/Manganese/ Seleni/Zn 0.5 ml/ Total Parenteral Nutrition/Amino 

Acids/Dextrose/ Fat Emulsion Intravenous 1,400 ml @  58.333 mls/ hr TPN  CONT IV

 Last administered on 3/23/20at 21:42;  Start 3/23/20 at 22:00;  Stop 3/24/20 at

21:59;  Status DC


Heparin Sodium (Porcine) (Heparin Sodium) 5,000 unit Q8HRS SQ  Last administered

on 3/28/20at 05:55;  Start 3/23/20 at 15:00;  Stop 3/28/20 at 13:28;  Status DC


Meropenem 500 mg/ Sodium Chloride 50 ml @  100 mls/hr Q6HRS IV  Last 

administered on 3/25/20at 06:00;  Start 3/24/20 at 09:00;  Stop 3/25/20 at 

07:29;  Status DC


Potassium Phosphate 20 mmol/ Sodium Chloride 106.6667 ml @  51.667 m... 1X  ONCE

IV  Last administered on 3/24/20at 11:22;  Start 3/24/20 at 10:15;  Stop 3/24/20

at 12:18;  Status DC


Acetaminophen (Tylenol Supp) 650 mg PRN Q6HRS  PRN MI MILD PAIN / TEMP > 100.3'F

Last administered on 5/5/20at 09:12;  Start 3/24/20 at 10:30


Potassium Chloride/Water 100 ml @  100 mls/hr Q1H IV  Last administered on 

3/24/20at 12:12;  Start 3/24/20 at 11:00;  Stop 3/24/20 at 12:59;  Status DC


Potassium Chloride 20 meq/ Bicarbonate Dialysis Soln w/ out KCl 5,010 ml @  

1,000 mls/hr Q5H1M IV  Last administered on 3/25/20at 08:48;  Start 3/24/20 at 

12:00;  Stop 3/25/20 at 13:03;  Status DC


Potassium Chloride 20 meq/ Bicarbonate Dialysis Soln w/ out KCl 5,010 ml @  

1,000 mls/hr Q5H1M IV  Last administered on 3/29/20at 14:52;  Start 3/24/20 at 

11:30;  Stop 3/29/20 at 19:59;  Status DC


Potassium Chloride 20 meq/ Bicarbonate Dialysis Soln w/ out KCl 5,010 ml @  

1,000 mls/hr Q5H1M IV  Last administered on 3/29/20at 14:53;  Start 3/24/20 at 

11:30;  Stop 3/29/20 at 19:59;  Status DC


Sodium Chloride 90 meq/Potassium Chloride 15 meq/ Potassium Phosphate 15 mmol/ 

Magnesium Sulfate 10 meq/Calcium Gluconate 15 meq/ Multivitamins 10 ml/Chromium/

Copper/Manganese/ Seleni/Zn 0.5 ml/ Total Parenteral Nutrition/Amino 

Acids/Dextrose/ Fat Emulsion Intravenous 1,400 ml @  58.333 mls/ hr TPN  CONT IV

 Last administered on 3/24/20at 22:17;  Start 3/24/20 at 22:00;  Stop 3/25/20 at

21:59;  Status DC


Cefepime HCl (Maxipime) 2 gm Q12HR IVP  Last administered on 4/7/20at 20:56;  

Start 3/25/20 at 09:00;  Stop 4/8/20 at 09:58;  Status DC


Daptomycin 500 mg/ Sodium Chloride 50 ml @  100 mls/hr Q48H IV  Last 

administered on 4/10/20at 09:57;  Start 3/25/20 at 08:30;  Stop 4/10/20 at 

10:07;  Status DC


Lidocaine HCl (Buffered Lidocaine 1%) 3 ml 1X  ONCE INJ  Last administered on 

3/25/20at 10:27;  Start 3/25/20 at 10:30;  Stop 3/25/20 at 10:31;  Status DC


Potassium Phosphate 20 mmol/ Sodium Chloride 106.6667 ml @  51.667 m... 1X  ONCE

IV  Last administered on 3/25/20at 12:51;  Start 3/25/20 at 13:00;  Stop 3/25/20

at 15:03;  Status DC


Sodium Chloride 90 meq/Potassium Chloride 15 meq/ Potassium Phosphate 18 mmol/ 

Magnesium Sulfate 8 meq/Calcium Gluconate 15 meq/ Multivitamins 10 ml/Chromium/ 

Copper/Manganese/ Seleni/Zn 0.5 ml/ Total Parenteral Nutrition/Amino 

Acids/Dextrose/ Fat Emulsion Intravenous 1,400 ml @  58.333 mls/ hr TPN  CONT IV

 Last administered on 3/25/20at 22:16;  Start 3/25/20 at 22:00;  Stop 3/26/20 at

21:59;  Status DC


Potassium Chloride 20 meq/ Bicarbonate Dialysis Soln w/ out KCl 5,010 ml @  

1,000 mls/hr Q5H1M IV  Last administered on 3/29/20at 14:54;  Start 3/25/20 at 

16:00;  Stop 3/29/20 at 19:59;  Status DC


Multi-Ingred Cream/Lotion/Oil/ Oint (Artificial Tears Eye Ointment) 1 thomas PRN 

Q1HR  PRN OU DRY EYE, 2nd choice Last administered on 4/13/20at 08:19;  Start 

3/25/20 at 17:30


Sodium Chloride 90 meq/Potassium Chloride 15 meq/ Potassium Phosphate 18 mmol/ 

Magnesium Sulfate 8 meq/Calcium Gluconate 15 meq/ Multivitamins 10 ml/Chromium/ 

Copper/Manganese/ Seleni/Zn 0.5 ml/ Total Parenteral Nutrition/Amino 

Acids/Dextrose/ Fat Emulsion Intravenous 1,400 ml @  58.333 mls/ hr TPN  CONT IV

 Last administered on 3/26/20at 22:00;  Start 3/26/20 at 22:00;  Stop 3/27/20 at

21:59;  Status DC


Albumin Human 500 ml @  125 mls/hr 1X  ONCE IV ;  Start 3/26/20 at 14:15;  Stop 

3/26/20 at 18:14;  Status DC


Sodium Chloride 90 meq/Potassium Chloride 15 meq/ Potassium Phosphate 18 mmol/ 

Magnesium Sulfate 8 meq/Calcium Gluconate 15 meq/ Multivitamins 10 ml/Chromium/ 

Copper/Manganese/ Seleni/Zn 0.5 ml/ Insulin Human Regular 10 unit/ Total 

Parenteral Nutrition/Amino Acids/Dextrose/ Fat Emulsion Intravenous 1,400 ml @  

58.333 mls/ hr TPN  CONT IV  Last administered on 3/27/20at 21:43;  Start 

3/27/20 at 22:00;  Stop 3/28/20 at 21:59;  Status DC


Lidocaine HCl (Buffered Lidocaine 1%) 3 ml STK-MED ONCE .ROUTE ;  Start 3/25/20 

at 10:00;  Stop 3/27/20 at 13:57;  Status DC


Midazolam HCl 100 mg/Sodium Chloride 100 ml @ 7 mls/hr CONT  PRN IV SEE PROTOCOL

Last administered on 4/8/20at 15:35;  Start 3/28/20 at 16:00


Sodium Chloride 90 meq/Potassium Chloride 15 meq/ Potassium Phosphate 18 mmol/ 

Magnesium Sulfate 8 meq/Calcium Gluconate 15 meq/ Multivitamins 10 ml/Chromium/ 

Copper/Manganese/ Seleni/Zn 0.5 ml/ Insulin Human Regular 15 unit/ Total Paren

teral Nutrition/Amino Acids/Dextrose/ Fat Emulsion Intravenous 1,400 ml @  

58.333 mls/ hr TPN  CONT IV  Last administered on 3/28/20at 20:34;  Start 

3/28/20 at 22:00;  Stop 3/29/20 at 21:59;  Status DC


Info ([facility] Electrolyte Protocol) 1 ea CONT PRN  PRN MC PER PROTOCOL;  Start 

3/29/20 at 13:15


Sodium Chloride 90 meq/Potassium Chloride 15 meq/ Potassium Phosphate 18 mmol/ 

Magnesium Sulfate 8 meq/Calcium Gluconate 15 meq/ Multivitamins 10 ml/Chromium/ 

Copper/Manganese/ Seleni/Zn 0.5 ml/ Insulin Human Regular 15 unit/ Total 

Parenteral Nutrition/Amino Acids/Dextrose/ Fat Emulsion Intravenous 1,400 ml @  

58.333 mls/ hr TPN  CONT IV  Last administered on 3/29/20at 22:05;  Start 

3/29/20 at 22:00;  Stop 3/30/20 at 21:59;  Status DC


Potassium Chloride 15 meq/ Bicarbonate Dialysis Soln w/ out KCl 5,007.5 ml  @ 

1,000 mls/ hr Q5H1M IV  Last administered on 4/1/20at 18:14;  Start 3/29/20 at 

20:00;  Stop 4/2/20 at 13:08;  Status DC


Potassium Chloride 15 meq/ Bicarbonate Dialysis Soln w/ out KCl 5,007.5 ml  @ 

1,000 mls/ hr Q5H1M IV  Last administered on 4/1/20at 18:14;  Start 3/29/20 at 

20:00;  Stop 4/2/20 at 13:08;  Status DC


Potassium Chloride 15 meq/ Bicarbonate Dialysis Soln w/ out KCl 5,007.5 ml  @ 

1,000 mls/ hr Q5H1M IV  Last administered on 4/1/20at 18:14;  Start 3/29/20 at 

20:00;  Stop 4/2/20 at 13:08;  Status DC


Iohexol (Omnipaque 240 Mg/ml) 30 ml 1X  ONCE PO  Last administered on 3/30/20at 

11:30;  Start 3/30/20 at 11:30;  Stop 3/30/20 at 11:33;  Status DC


Info (CONTRAST GIVEN -- Rx MONITORING) 1 each PRN DAILY  PRN MC SEE COMMENTS;  

Start 3/30/20 at 11:45;  Stop 4/1/20 at 11:44;  Status DC


Sodium Chloride 90 meq/Potassium Chloride 15 meq/ Potassium Phosphate 18 mmol/ 

Magnesium Sulfate 8 meq/Calcium Gluconate 15 meq/ Multivitamins 10 ml/Chromium/ 

Copper/Manganese/ Seleni/Zn 0.5 ml/ Insulin Human Regular 15 unit/ Total 

Parenteral Nutrition/Amino Acids/Dextrose/ Fat Emulsion Intravenous 1,400 ml @  

58.333 mls/ hr TPN  CONT IV  Last administered on 3/30/20at 21:47;  Start 

3/30/20 at 22:00;  Stop 3/31/20 at 21:59;  Status DC


Sodium Chloride 90 meq/Potassium Chloride 15 meq/ Potassium Phosphate 18 mmol/ 

Magnesium Sulfate 8 meq/Calcium Gluconate 15 meq/ Multivitamins 10 ml/Chromium/ 

Copper/Manganese/ Seleni/Zn 0.5 ml/ Insulin Human Regular 20 unit/ Total 

Parenteral Nutrition/Amino Acids/Dextrose/ Fat Emulsion Intravenous 1,400 ml @  

58.333 mls/ hr TPN  CONT IV  Last administered on 3/31/20at 21:36;  Start 

3/31/20 at 22:00;  Stop 4/1/20 at 21:59;  Status DC


Alteplase, Recombinant (Cathflo For Central Catheter Clearance) 1 mg 1X  ONCE 

INT CAT  Last administered on 3/31/20at 20:03;  Start 3/31/20 at 19:30;  Stop 

3/31/20 at 19:46;  Status DC


Alteplase, Recombinant (Cathflo For Central Catheter Clearance) 1 mg 1X  ONCE 

INT CAT  Last administered on 3/31/20at 22:05;  Start 3/31/20 at 22:00;  Stop 

3/31/20 at 22:01;  Status DC


Sodium Chloride 90 meq/Potassium Chloride 15 meq/ Potassium Phosphate 18 mmol/ 

Magnesium Sulfate 8 meq/Calcium Gluconate 15 meq/ Multivitamins 10 ml/Chromium/ 

Copper/Manganese/ Seleni/Zn 0.5 ml/ Insulin Human Regular 20 unit/ Total 

Parenteral Nutrition/Amino Acids/Dextrose/ Fat Emulsion Intravenous 1,400 ml @  

58.333 mls/ hr TPN  CONT IV  Last administered on 4/1/20at 21:30;  Start 4/1/20 

at 22:00;  Stop 4/2/20 at 21:59;  Status DC


Dexmedetomidine HCl 400 mcg/ Sodium Chloride 100 ml @ 0 mls/hr CONT  PRN IV 

ANXIETY / AGITATION Last administered on 5/6/20at 13:13;  Start 4/2/20 at 08:15


Sodium Chloride 500 ml @  500 mls/hr 1X PRN  PRN IV ELEVATED BP, SEE COMMENTS;  

Start 4/2/20 at 08:15


Atropine Sulfate (ATROPINE 0.5mg SYRINGE) 0.5 mg PRN Q5MIN  PRN IV SEE COMMENTS;

 Start 4/2/20 at 08:15


Furosemide (Lasix) 20 mg 1X  ONCE IVP  Last administered on 4/2/20at 08:19;  

Start 4/2/20 at 08:15;  Stop 4/2/20 at 08:16;  Status DC


Lidocaine HCl (Buffered Lidocaine 1%) 3 ml STK-MED ONCE .ROUTE ;  Start 4/2/20 

at 08:39;  Stop 4/2/20 at 08:39;  Status DC


Lidocaine HCl (Buffered Lidocaine 1%) 6 ml 1X  ONCE INJ  Last administered on 

4/2/20at 09:05;  Start 4/2/20 at 09:00;  Stop 4/2/20 at 09:06;  Status DC


Sodium Chloride 90 meq/Potassium Chloride 15 meq/ Potassium Phosphate 18 mmol/ 

Magnesium Sulfate 8 meq/Calcium Gluconate 15 meq/ Multivitamins 10 ml/Chromium/ 

Copper/Manganese/ Seleni/Zn 0.5 ml/ Insulin Human Regular 20 unit/ Total 

Parenteral Nutrition/Amino Acids/Dextrose/ Fat Emulsion Intravenous 1,400 ml @  

58.333 mls/ hr TPN  CONT IV  Last administered on 4/2/20at 22:45;  Start 4/2/20 

at 22:00;  Stop 4/3/20 at 21:59;  Status DC


Sodium Chloride 1,000 ml @  1,000 mls/hr Q1H PRN IV hypotension;  Start 4/3/20 

at 07:30;  Stop 4/3/20 at 13:29;  Status DC


Albumin Human 200 ml @  200 mls/hr 1X PRN  PRN IV Hypotension Last administered 

on 4/3/20at 09:36;  Start 4/3/20 at 07:30;  Stop 4/3/20 at 13:29;  Status DC


Sodium Chloride (Normal Saline Flush) 10 ml 1X PRN  PRN IV AP catheter pack;  

Start 4/3/20 at 07:30;  Stop 4/3/20 at 21:29;  Status DC


Sodium Chloride (Normal Saline Flush) 10 ml 1X PRN  PRN IV  catheter pack;  

Start 4/3/20 at 07:30;  Stop 4/4/20 at 07:29;  Status DC


Sodium Chloride 1,000 ml @  400 mls/hr Q2H30M PRN IV PATENCY;  Start 4/3/20 at 

07:30;  Stop 4/3/20 at 19:29;  Status DC


Info (PHARMACY MONITORING -- do not chart) 1 each PRN DAILY  PRN MC SEE COMME

NTS;  Start 4/3/20 at 07:30;  Stop 4/3/20 at 13:02;  Status DC


Info (PHARMACY MONITORING -- do not chart) 1 each PRN DAILY  PRN MC SEE 

COMMENTS;  Start 4/3/20 at 07:30;  Stop 4/5/20 at 12:45;  Status DC


Sodium Chloride 90 meq/Potassium Chloride 15 meq/ Potassium Phosphate 10 mmol/ 

Magnesium Sulfate 8 meq/Calcium Gluconate 15 meq/ Multivitamins 10 ml/Chromium/ 

Copper/Manganese/ Seleni/Zn 0.5 ml/ Insulin Human Regular 25 unit/ Total 

Parenteral Nutrition/Amino Acids/Dextrose/ Fat Emulsion Intravenous 1,400 ml @  

58.333 mls/ hr TPN  CONT IV  Last administered on 4/3/20at 22:19;  Start 4/3/20 

at 22:00;  Stop 4/4/20 at 21:59;  Status DC


Heparin Sodium (Porcine) (Heparin Sodium) 5,000 unit Q12HR SQ  Last administered

on 4/26/20at 08:59;  Start 4/3/20 at 21:00;  Stop 4/26/20 at 10:05;  Status DC


Ondansetron HCl (Zofran) 4 mg PRN Q6HRS  PRN IV NAUSEA/VOMITING;  Start 4/6/20 

at 07:00;  Stop 4/7/20 at 06:59;  Status DC


Fentanyl Citrate (Fentanyl 2ml Vial) 25 mcg PRN Q5MIN  PRN IV MILD PAIN 1-3;  

Start 4/6/20 at 07:00;  Stop 4/7/20 at 06:59;  Status DC


Fentanyl Citrate (Fentanyl 2ml Vial) 50 mcg PRN Q5MIN  PRN IV MODERATE TO SEVERE

PAIN;  Start 4/6/20 at 07:00;  Stop 4/7/20 at 06:59;  Status DC


Ringer's Solution 1,000 ml @  30 mls/hr Q24H IV ;  Start 4/6/20 at 07:00;  Stop 

4/6/20 at 18:59;  Status DC


Lidocaine HCl (Xylocaine-Mpf 1% 2ml Vial) 2 ml PRN 1X  PRN ID PRIOR TO IV START;

 Start 4/6/20 at 07:00;  Stop 4/7/20 at 06:59;  Status DC


Prochlorperazine Edisylate (Compazine) 5 mg PACU PRN  PRN IV NAUSEA, MRX1;  

Start 4/6/20 at 07:00;  Stop 4/7/20 at 06:59;  Status DC


Sodium Chloride 1,000 ml @  1,000 mls/hr Q1H PRN IV hypotension;  Start 4/4/20 

at 09:10;  Stop 4/4/20 at 15:09;  Status DC


Albumin Human 200 ml @  200 mls/hr 1X PRN  PRN IV Hypotension Last administered 

on 4/4/20at 10:10;  Start 4/4/20 at 09:15;  Stop 4/4/20 at 15:14;  Status DC


Sodium Chloride 1,000 ml @  400 mls/hr Q2H30M PRN IV PATENCY;  Start 4/4/20 at 

09:10;  Stop 4/4/20 at 21:09;  Status DC


Info (PHARMACY MONITORING -- do not chart) 1 each PRN DAILY  PRN MC SEE 

COMMENTS;  Start 4/4/20 at 09:15;  Stop 4/5/20 at 12:45;  Status DC


Info (PHARMACY MONITORING -- do not chart) 1 each PRN DAILY  PRN MC SEE 

COMMENTS;  Start 4/4/20 at 09:15;  Stop 4/5/20 at 12:45;  Status DC


Sodium Chloride 90 meq/Potassium Chloride 15 meq/ Potassium Phosphate 10 mmol/ 

Magnesium Sulfate 8 meq/Calcium Gluconate 15 meq/ Multivitamins 10 ml/Chromium/ 

Copper/Manganese/ Seleni/Zn 0.5 ml/ Insulin Human Regular 25 unit/ Total 

Parenteral Nutrition/Amino Acids/Dextrose/ Fat Emulsion Intravenous 1,400 ml @  

58.333 mls/ hr TPN  CONT IV  Last administered on 4/4/20at 22:10;  Start 4/4/20 

at 22:00;  Stop 4/5/20 at 21:59;  Status DC


Magnesium Sulfate 50 ml @ 25 mls/hr PRN DAILY  PRN IV for Mag < 1.7 on am labs 

Last administered on 4/20/20at 17:27;  Start 4/5/20 at 09:15


Sodium Chloride 90 meq/Potassium Chloride 15 meq/ Potassium Phosphate 10 mmol/ 

Magnesium Sulfate 8 meq/Calcium Gluconate 15 meq/ Multivitamins 10 ml/Chromium/ 

Copper/Manganese/ Seleni/Zn 0.5 ml/ Insulin Human Regular 25 unit/ Total Pa

renteral Nutrition/Amino Acids/Dextrose/ Fat Emulsion Intravenous 1,400 ml @  

58.333 mls/ hr TPN  CONT IV  Last administered on 4/5/20at 21:20;  Start 4/5/20 

at 22:00;  Stop 4/6/20 at 21:59;  Status DC


Sodium Chloride 1,000 ml @  1,000 mls/hr Q1H PRN IV hypotension;  Start 4/5/20 

at 12:23;  Stop 4/5/20 at 18:22;  Status DC


Albumin Human 200 ml @  200 mls/hr 1X  ONCE IV  Last administered on 4/5/20at 

13:34;  Start 4/5/20 at 12:30;  Stop 4/5/20 at 13:29;  Status DC


Diphenhydramine HCl (Benadryl) 25 mg 1X PRN  PRN IV ITCHING;  Start 4/5/20 at 

12:30;  Stop 4/6/20 at 12:29;  Status DC


Diphenhydramine HCl (Benadryl) 25 mg 1X PRN  PRN IV ITCHING;  Start 4/5/20 at 

12:30;  Stop 4/6/20 at 12:29;  Status DC


Info (PHARMACY MONITORING -- do not chart) 1 each PRN DAILY  PRN MC SEE 

COMMENTS;  Start 4/5/20 at 12:30;  Status Cancel


Bupivacaine HCl/ Epinephrine Bitart (Sensorcain-Epi 0.5%-1:[id number] Mpf) 30 ml 

STK-MED ONCE .ROUTE  Last administered on 4/6/20at 11:44;  Start 4/6/20 at 

11:00;  Stop 4/6/20 at 11:01;  Status DC


Cellulose (Surgicel Fibrillar 1x2) 1 each STK-MED ONCE .ROUTE ;  Start 4/6/20 at

11:00;  Stop 4/6/20 at 11:01;  Status DC


Sodium Chloride 90 meq/Potassium Chloride 15 meq/ Potassium Phosphate 10 mmol/ 

Magnesium Sulfate 12 meq/Calcium Gluconate 15 meq/ Multivitamins 10 ml/Chromium/

Copper/Manganese/ Seleni/Zn 0.5 ml/ Insulin Human Regular 25 unit/ Total 

Parenteral Nutrition/Amino Acids/Dextrose/ Fat Emulsion Intravenous 1,400 ml @  

58.333 mls/ hr TPN  CONT IV  Last administered on 4/6/20at 22:24;  Start 4/6/20 

at 22:00;  Stop 4/7/20 at 21:59;  Status DC


Propofol 20 ml @ As Directed STK-MED ONCE IV ;  Start 4/6/20 at 11:07;  Stop 

4/6/20 at 11:07;  Status DC


Cellulose (Surgicel Hemostat 4x8) 1 each STK-MED ONCE .ROUTE  Last administered 

on 4/6/20at 11:44;  Start 4/6/20 at 11:55;  Stop 4/6/20 at 11:56;  Status DC


Sevoflurane (Ultane) 60 ml STK-MED ONCE IH ;  Start 4/6/20 at 12:46;  Stop 4/6/ 20 at 12:46;  Status DC


Sodium Chloride 1,000 ml @  1,000 mls/hr Q1H PRN IV hypotension;  Start 4/6/20 

at 13:51;  Stop 4/6/20 at 19:50;  Status DC


Albumin Human 200 ml @  200 mls/hr 1X PRN  PRN IV Hypotension Last administered 

on 4/6/20at 14:51;  Start 4/6/20 at 14:00;  Stop 4/6/20 at 19:59;  Status DC


Diphenhydramine HCl (Benadryl) 25 mg 1X PRN  PRN IV ITCHING;  Start 4/6/20 at 14

:00;  Stop 4/7/20 at 13:59;  Status DC


Diphenhydramine HCl (Benadryl) 25 mg 1X PRN  PRN IV ITCHING;  Start 4/6/20 at 

14:00;  Stop 4/7/20 at 13:59;  Status DC


Sodium Chloride 1,000 ml @  400 mls/hr Q2H30M PRN IV PATENCY;  Start 4/6/20 at 

13:51;  Stop 4/7/20 at 01:50;  Status DC


Info (PHARMACY MONITORING -- do not chart) 1 each PRN DAILY  PRN MC SEE 

COMMENTS;  Start 4/6/20 at 14:00;  Stop 4/9/20 at 08:16;  Status DC


Heparin Sodium (Porcine) (Hep Lock Adult) 500 unit STK-MED ONCE IVP ;  Start 

4/7/20 at 09:29;  Stop 4/7/20 at 09:30;  Status DC


Sodium Chloride 1,000 ml @  1,000 mls/hr Q1H PRN IV hypotension;  Start 4/7/20 

at 10:43;  Stop 4/7/20 at 16:42;  Status DC


Sodium Chloride 1,000 ml @  400 mls/hr Q2H30M PRN IV PATENCY;  Start 4/7/20 at 

10:43;  Stop 4/7/20 at 22:42;  Status DC


Info (PHARMACY MONITORING -- do not chart) 1 each PRN DAILY  PRN MC SEE 

COMMENTS;  Start 4/7/20 at 10:45;  Status UNV


Info (PHARMACY MONITORING -- do not chart) 1 each PRN DAILY  PRN MC SEE 

COMMENTS;  Start 4/7/20 at 10:45;  Status UNV


Sodium Chloride 90 meq/Potassium Chloride 15 meq/ Magnesium Sulfate 12 

meq/Calcium Gluconate 15 meq/ Multivitamins 10 ml/Chromium/ Copper/Manganese/ 

Seleni/Zn 0.5 ml/ Insulin Human Regular 25 unit/ Total Parenteral 

Nutrition/Amino Acids/Dextrose/ Fat Emulsion Intravenous 1,400 ml @  58.333 mls/

hr TPN  CONT IV  Last administered on 4/7/20at 22:13;  Start 4/7/20 at 22:00;  

Stop 4/8/20 at 21:59;  Status DC


Sodium Chloride 1,000 ml @  1,000 mls/hr Q1H PRN IV hypotension;  Start 4/8/20 

at 07:50;  Stop 4/8/20 at 13:49;  Status DC


Albumin Human 200 ml @  200 mls/hr 1X  ONCE IV ;  Start 4/8/20 at 08:00;  Stop 

4/8/20 at 08:53;  Status DC


Diphenhydramine HCl (Benadryl) 25 mg 1X PRN  PRN IV ITCHING;  Start 4/8/20 at 

08:00;  Stop 4/9/20 at 07:59;  Status DC


Diphenhydramine HCl (Benadryl) 25 mg 1X PRN  PRN IV ITCHING;  Start 4/8/20 at 

08:00;  Stop 4/9/20 at 07:59;  Status DC


Info (PHARMACY MONITORING -- do not chart) 1 each PRN DAILY  PRN MC SEE 

COMMENTS;  Start 4/8/20 at 08:00;  Stop 4/9/20 at 08:16;  Status DC


Albumin Human 50 ml @ 50 mls/hr 1X  ONCE IV ;  Start 4/8/20 at 08:53;  Stop 

4/8/20 at 08:56;  Status DC


Albumin Human 200 ml @  50 mls/hr PRN 1X  PRN IV HYPOTENSION Last administered 

on 4/14/20at 11:54;  Start 4/8/20 at 09:00


Meropenem 500 mg/ Sodium Chloride 50 ml @  100 mls/hr Q12H IV  Last administered

on 4/28/20at 10:45;  Start 4/8/20 at 10:00;  Stop 4/28/20 at 12:37;  Status DC


Sodium Chloride 90 meq/Magnesium Sulfate 12 meq/ Calcium Gluconate 15 meq/ 

Multivitamins 10 ml/Chromium/ Copper/Manganese/ Seleni/Zn 0.5 ml/ Insulin Human 

Regular 25 unit/ Total Parenteral Nutrition/Amino Acids/Dextrose/ Fat Emulsion 

Intravenous 1,400 ml @  58.333 mls/ hr TPN  CONT IV  Last administered on 

4/8/20at 21:41;  Start 4/8/20 at 22:00;  Stop 4/9/20 at 21:59;  Status DC


Sodium Chloride 1,000 ml @  1,000 mls/hr Q1H PRN IV hypotension;  Start 4/9/20 

at 07:58;  Stop 4/9/20 at 13:57;  Status DC


Albumin Human 200 ml @  200 mls/hr 1X PRN  PRN IV Hypotension Last administered 

on 4/9/20at 09:30;  Start 4/9/20 at 08:00;  Stop 4/9/20 at 13:59;  Status DC


Sodium Chloride 1,000 ml @  400 mls/hr Q2H30M PRN IV PATENCY;  Start 4/9/20 at 

07:58;  Stop 4/9/20 at 19:57;  Status DC


Info (PHARMACY MONITORING -- do not chart) 1 each PRN DAILY  PRN MC SEE 

COMMENTS;  Start 4/9/20 at 08:00;  Status Cancel


Info (PHARMACY MONITORING -- do not chart) 1 each PRN DAILY  PRN MC SEE 

COMMENTS;  Start 4/9/20 at 08:15;  Status UNV


Sodium Chloride 90 meq/Potassium Phosphate 5 mmol/ Magnesium Sulfate 12 

meq/Calcium Gluconate 15 meq/ Multivitamins 10 ml/Chromium/ Copper/Manganese/ 

Seleni/Zn 0.5 ml/ Insulin Human Regular 30 unit/ Total Parenteral 

Nutrition/Amino Acids/Dextrose/ Fat Emulsion Intravenous 1,400 ml @  58.333 mls/

hr TPN  CONT IV  Last administered on 4/9/20at 22:08;  Start 4/9/20 at 22:00;  

Stop 4/10/20 at 21:59;  Status DC


Linezolid/Dextrose 300 ml @  300 mls/hr Q12HR IV  Last administered on 4/20/20at

20:40;  Start 4/10/20 at 11:00;  Stop 4/21/20 at 08:10;  Status DC


Sodium Chloride 90 meq/Potassium Phosphate 15 mmol/ Magnesium Sulfate 12 

meq/Calcium Gluconate 15 meq/ Multivitamins 10 ml/Chromium/ Copper/Manganese/ 

Seleni/Zn 0.5 ml/ Insulin Human Regular 30 unit/ Total Parenteral N

utrition/Amino Acids/Dextrose/ Fat Emulsion Intravenous 1,400 ml @  58.333 mls/ 

hr TPN  CONT IV  Last administered on 4/10/20at 21:49;  Start 4/10/20 at 22:00; 

Stop 4/11/20 at 21:59;  Status DC


Sodium Chloride 90 meq/Potassium Phosphate 15 mmol/ Magnesium Sulfate 12 

meq/Calcium Gluconate 15 meq/ Multivitamins 10 ml/Chromium/ Copper/Manganese/ 

Seleni/Zn 0.5 ml/ Insulin Human Regular 40 unit/ Total Parenteral 

Nutrition/Amino Acids/Dextrose/ Fat Emulsion Intravenous 1,400 ml @  58.333 mls/

hr TPN  CONT IV  Last administered on 4/11/20at 21:21;  Start 4/11/20 at 22:00; 

Stop 4/12/20 at 21:59;  Status DC


Sodium Chloride 1,000 ml @  1,000 mls/hr Q1H PRN IV hypotension;  Start 4/11/20 

at 13:26;  Stop 4/11/20 at 19:25;  Status DC


Albumin Human 200 ml @  200 mls/hr 1X PRN  PRN IV Hypotension Last administered 

on 4/11/20at 15:00;  Start 4/11/20 at 13:30;  Stop 4/11/20 at 19:29;  Status DC


Sodium Chloride (Normal Saline Flush) 10 ml 1X PRN  PRN IV AP catheter pack;  

Start 4/11/20 at 13:30;  Stop 4/12/20 at 13:29;  Status DC


Sodium Chloride (Normal Saline Flush) 10 ml 1X PRN  PRN IV  catheter pack;  

Start 4/11/20 at 13:30;  Stop 4/12/20 at 13:29;  Status DC


Sodium Chloride 1,000 ml @  400 mls/hr Q2H30M PRN IV PATENCY;  Start 4/11/20 at 

13:26;  Stop 4/12/20 at 01:25;  Status DC


Info (PHARMACY MONITORING -- do not chart) 1 each PRN DAILY  PRN MC SEE 

COMMENTS;  Start 4/11/20 at 13:30;  Stop 4/11/20 at 13:33;  Status DC


Info (PHARMACY MONITORING -- do not chart) 1 each PRN DAILY  PRN MC SEE 

COMMENTS;  Start 4/11/20 at 13:30;  Stop 4/11/20 at 13:34;  Status DC


Sodium Chloride 90 meq/Potassium Phosphate 19 mmol/ Magnesium Sulfate 12 

meq/Calcium Gluconate 15 meq/ Multivitamins 10 ml/Chromium/ Copper/Manganese/ 

Seleni/Zn 0.5 ml/ Insulin Human Regular 40 unit/ Total Parenteral Nu

trition/Amino Acids/Dextrose/ Fat Emulsion Intravenous 1,400 ml @  58.333 mls/ 

hr TPN  CONT IV  Last administered on 4/12/20at 21:54;  Start 4/12/20 at 22:00; 

Stop 4/13/20 at 21:59;  Status DC


Sodium Chloride 1,000 ml @  1,000 mls/hr Q1H PRN IV hypotension;  Start 4/13/20 

at 09:35;  Stop 4/13/20 at 15:34;  Status DC


Albumin Human 200 ml @  200 mls/hr 1X PRN  PRN IV Hypotension;  Start 4/13/20 at

09:45;  Stop 4/13/20 at 15:44;  Status DC


Diphenhydramine HCl (Benadryl) 25 mg 1X PRN  PRN IV ITCHING;  Start 4/13/20 at 

09:45;  Stop 4/14/20 at 09:44;  Status DC


Diphenhydramine HCl (Benadryl) 25 mg 1X PRN  PRN IV ITCHING;  Start 4/13/20 at 

09:45;  Stop 4/14/20 at 09:44;  Status DC


Sodium Chloride 1,000 ml @  400 mls/hr Q2H30M PRN IV PATENCY;  Start 4/13/20 at 

09:35;  Stop 4/13/20 at 21:34;  Status DC


Info (PHARMACY MONITORING -- do not chart) 1 each PRN DAILY  PRN MC SEE 

COMMENTS;  Start 4/13/20 at 09:45;  Status Cancel


Sodium Chloride 100 meq/Potassium Phosphate 19 mmol/ Magnesium Sulfate 12 

meq/Calcium Gluconate 15 meq/ Multivitamins 10 ml/Chromium/ Copper/Manganese/ 

Seleni/Zn 0.5 ml/ Insulin Human Regular 40 unit/ Potassium Chloride 20 meq/ 

Total Parenteral Nutrition/Amino Acids/Dextrose/ Fat Emulsion Intravenous 1,400 

ml @  58.333 mls/ hr TPN  CONT IV  Last administered on 4/13/20at 22:02;  Start 

4/13/20 at 22:00;  Stop 4/14/20 at 21:59;  Status DC


Furosemide (Lasix) 40 mg 1X  ONCE IVP  Last administered on 4/13/20at 14:39;  

Start 4/13/20 at 14:30;  Stop 4/13/20 at 14:31;  Status DC


Metronidazole 100 ml @  100 mls/hr Q8HRS IV  Last administered on 4/21/20at 

06:04;  Start 4/14/20 at 10:00;  Stop 4/21/20 at 08:10;  Status DC


Sodium Chloride 1,000 ml @  1,000 mls/hr Q1H PRN IV hypotension;  Start 4/14/20 

at 08:00;  Stop 4/14/20 at 13:59;  Status DC


Albumin Human 200 ml @  200 mls/hr 1X PRN  PRN IV Hypotension;  Start 4/14/20 at

08:00;  Stop 4/14/20 at 13:59;  Status DC


Sodium Chloride 1,000 ml @  400 mls/hr Q2H30M PRN IV PATENCY;  Start 4/14/20 at 

08:00;  Stop 4/14/20 at 19:59;  Status DC


Info (PHARMACY MONITORING -- do not chart) 1 each PRN DAILY  PRN MC SEE 

COMMENTS;  Start 4/14/20 at 11:30;  Status UNV


Info (PHARMACY MONITORING -- do not chart) 1 each PRN DAILY  PRN MC SEE 

COMMENTS;  Start 4/14/20 at 11:30;  Stop 4/16/20 at 12:13;  Status DC


Sodium Chloride 100 meq/Potassium Phosphate 19 mmol/ Magnesium Sulfate 12 

meq/Calcium Gluconate 15 meq/ Multivitamins 10 ml/Chromium/ Copper/Manganese/ 

Seleni/Zn 0.5 ml/ Insulin Human Regular 40 unit/ Potassium Chloride 20 meq/ 

Total Parenteral Nutrition/Amino Acids/Dextrose/ Fat Emulsion Intravenous 1,400 

ml @  58.333 mls/ hr TPN  CONT IV  Last administered on 4/14/20at 21:52;  Start 

4/14/20 at 22:00;  Stop 4/15/20 at 21:59;  Status DC


Sodium Chloride (Normal Saline Flush) 10 ml QSHIFT  PRN IV AFTER MEDS AND BLOOD 

DRAWS;  Start 4/14/20 at 15:00


Sodium Chloride (Normal Saline Flush) 10 ml PRN Q5MIN  PRN IV AFTER MEDS AND 

BLOOD DRAWS;  Start 4/14/20 at 15:00


Sodium Chloride (Normal Saline Flush) 20 ml PRN Q5MIN  PRN IV AFTER MEDS AND 

BLOOD DRAWS;  Start 4/14/20 at 15:00


Sodium Chloride 100 meq/Potassium Phosphate 19 mmol/ Magnesium Sulfate 12 

meq/Calcium Gluconate 15 meq/ Multivitamins 10 ml/Chromium/ Copper/Manganese/ 

Seleni/Zn 0.5 ml/ Insulin Human Regular 40 unit/ Potassium Chloride 20 meq/ 

Total Parenteral Nutrition/Amino Acids/Dextrose/ Fat Emulsion Intravenous 1,400 

ml @  58.333 mls/ hr TPN  CONT IV  Last administered on 4/15/20at 21:20;  Start 

4/15/20 at 22:00;  Stop 4/16/20 at 21:59;  Status DC


Lidocaine HCl (Buffered Lidocaine 1%) 3 ml STK-MED ONCE .ROUTE ;  Start 4/15/20 

at 13:16;  Stop 4/15/20 at 13:16;  Status DC


Lidocaine HCl (Buffered Lidocaine 1%) 6 ml 1X  ONCE INJ  Last administered on 

4/15/20at 13:45;  Start 4/15/20 at 13:30;  Stop 4/15/20 at 13:31;  Status DC


Albumin Human 100 ml @  100 mls/hr 1X  ONCE IV  Last administered on 4/15/20at 

15:41;  Start 4/15/20 at 15:00;  Stop 4/15/20 at 15:59;  Status DC


Albumin Human 50 ml @ 50 mls/hr 1X  ONCE IV  Last administered on 4/15/20at 

15:00;  Start 4/15/20 at 15:00;  Stop 4/15/20 at 15:59;  Status DC


Info (PHARMACY MONITORING -- do not chart) 1 each PRN DAILY  PRN MC SEE 

COMMENTS;  Start 4/16/20 at 11:30;  Status Cancel


Info (PHARMACY MONITORING -- do not chart) 1 each PRN DAILY  PRN MC SEE 

COMMENTS;  Start 4/16/20 at 11:30;  Status UNV


Sodium Chloride 100 meq/Potassium Phosphate 10 mmol/ Magnesium Sulfate 12 

meq/Calcium Gluconate 15 meq/ Multivitamins 10 ml/Chromium/ Copper/Manganese/ 

Seleni/Zn 0.5 ml/ Insulin Human Regular 35 unit/ Potassium Chloride 20 meq/ 

Total Parenteral Nutrition/Amino Acids/Dextrose/ Fat Emulsion Intravenous 1,400 

ml @  58.333 mls/ hr TPN  CONT IV  Last administered on 4/16/20at 22:10;  Start 

4/16/20 at 22:00;  Stop 4/17/20 at 21:59;  Status DC


Sodium Chloride 100 meq/Potassium Phosphate 5 mmol/ Magnesium Sulfate 12 

meq/Calcium Gluconate 15 meq/ Multivitamins 10 ml/Chromium/ Copper/Manganese/ 

Seleni/Zn 0.5 ml/ Insulin Human Regular 35 unit/ Potassium Chloride 20 meq/ 

Total Parenteral Nutrition/Amino Acids/Dextrose/ Fat Emulsion Intravenous 1,400 

ml @  58.333 mls/ hr TPN  CONT IV  Last administered on 4/17/20at 22:59;  Start 

4/17/20 at 22:00;  Stop 4/18/20 at 21:59;  Status DC


Sodium Chloride 1,000 ml @  1,000 mls/hr Q1H PRN IV hypotension;  Start 4/18/20 

at 08:27;  Stop 4/18/20 at 14:26;  Status DC


Albumin Human 200 ml @  200 mls/hr 1X PRN  PRN IV Hypotension Last administered 

on 4/18/20at 09:18;  Start 4/18/20 at 08:30;  Stop 4/18/20 at 14:29;  Status DC


Sodium Chloride 1,000 ml @  400 mls/hr Q2H30M PRN IV PATENCY;  Start 4/18/20 at 

08:27;  Stop 4/18/20 at 20:26;  Status DC


Info (PHARMACY MONITORING -- do not chart) 1 each PRN DAILY  PRN MC SEE 

COMMENTS;  Start 4/18/20 at 08:30;  Status Cancel


Info (PHARMACY MONITORING -- do not chart) 1 each PRN DAILY  PRN MC SEE 

COMMENTS;  Start 4/18/20 at 08:30;  Stop 4/26/20 at 13:10;  Status DC


Sodium Chloride 100 meq/Potassium Chloride 40 meq/ Magnesium Sulfate 15 

meq/Calcium Gluconate 15 meq/ Multivitamins 10 ml/Chromium/ Copper/Manganese/ 

Seleni/Zn 0.5 ml/ Insulin Human Regular 35 unit/ Total Parenteral 

Nutrition/Amino Acids/Dextrose/ Fat Emulsion Intravenous 1,400 ml @  58.333 mls/

hr TPN  CONT IV  Last administered on 4/18/20at 22:00;  Start 4/18/20 at 22:00; 

Stop 4/19/20 at 21:59;  Status DC


Potassium Chloride/Water 100 ml @  100 mls/hr 1X  ONCE IV  Last administered on 

4/18/20at 17:28;  Start 4/18/20 at 14:45;  Stop 4/18/20 at 15:44;  Status DC


Sodium Chloride 100 meq/Potassium Chloride 40 meq/ Magnesium Sulfate 15 

meq/Calcium Gluconate 15 meq/ Multivitamins 10 ml/Chromium/ Copper/Manganese/ 

Seleni/Zn 0.5 ml/ Insulin Human Regular 35 unit/ Total Parenteral Nutritio

n/Amino Acids/Dextrose/ Fat Emulsion Intravenous 1,400 ml @  58.333 mls/ hr TPN 

CONT IV  Last administered on 4/19/20at 22:46;  Start 4/19/20 at 22:00;  Stop 

4/20/20 at 21:59;  Status DC


Sodium Chloride 100 meq/Potassium Chloride 40 meq/ Magnesium Sulfate 20 

meq/Calcium Gluconate 15 meq/ Multivitamins 10 ml/Chromium/ Copper/Manganese/ 

Seleni/Zn 0.5 ml/ Insulin Human Regular 35 unit/ Total Parenteral 

Nutrition/Amino Acids/Dextrose/ Fat Emulsion Intravenous 1,400 ml @  58.333 mls/

hr TPN  CONT IV  Last administered on 4/20/20at 22:31;  Start 4/20/20 at 22:00; 

Stop 4/21/20 at 21:59;  Status DC


Fentanyl Citrate (Fentanyl 2ml Vial) 50 mcg PRN Q2HR  PRN IVP PAIN Last 

administered on 4/27/20at 13:32;  Start 4/20/20 at 21:00;  Stop 4/28/20 at 

12:53;  Status DC


Fentanyl Citrate (Fentanyl 2ml Vial) 25 mcg PRN Q2HR  PRN IVP PAIN;  Start 

4/20/20 at 21:00;  Stop 4/28/20 at 12:54;  Status DC


Enoxaparin Sodium (Lovenox 100mg Syringe) 100 mg Q12HR SQ ;  Start 4/21/20 at 

21:00;  Status UNV


Amino Acids/ Glycerin/ Electrolytes 1,000 ml @  75 mls/hr [id number] IV ;  Start 

4/20/20 at 21:15;  Status UNV


Sodium Chloride 1,000 ml @  1,000 mls/hr Q1H PRN IV hypotension;  Start 4/21/20 

at 07:56;  Stop 4/21/20 at 13:55;  Status DC


Albumin Human 200 ml @  200 mls/hr 1X PRN  PRN IV Hypotension Last administered 

on 4/21/20at 08:40;  Start 4/21/20 at 08:00;  Stop 4/21/20 at 13:59;  Status DC


Sodium Chloride 1,000 ml @  400 mls/hr Q2H30M PRN IV PATENCY;  Start 4/21/20 at 

07:56;  Stop 4/21/20 at 19:55;  Status DC


Info (PHARMACY MONITORING -- do not chart) 1 each PRN DAILY  PRN MC SEE 

COMMENTS;  Start 4/21/20 at 08:00;  Status UNV


Info (PHARMACY MONITORING -- do not chart) 1 each PRN DAILY  PRN MC SEE 

COMMENTS;  Start 4/21/20 at 08:00;  Status UNV


Daptomycin 430 mg/ Sodium Chloride 50 ml @  100 mls/hr Q24H IV  Last 

administered on 4/21/20at 12:35;  Start 4/21/20 at 09:00;  Stop 4/21/20 at 

12:49;  Status DC


Sodium Chloride 100 meq/Potassium Chloride 40 meq/ Magnesium Sulfate 20 

meq/Calcium Gluconate 15 meq/ Multivitamins 10 ml/Chromium/ Copper/Manganese/ 

Seleni/Zn 0.5 ml/ Insulin Human Regular 35 unit/ Total Parenteral 

Nutrition/Amino Acids/Dextrose/ Fat Emulsion Intravenous 1,400 ml @  58.333 mls/

hr TPN  CONT IV  Last administered on 4/21/20at 21:26;  Start 4/21/20 at 22:00; 

Stop 4/22/20 at 21:59;  Status DC


Daptomycin 430 mg/ Sodium Chloride 50 ml @  100 mls/hr Q48H IV ;  Start 4/23/20 

at 09:00;  Stop 4/22/20 at 11:55;  Status DC


Sodium Chloride 100 meq/Potassium Chloride 40 meq/ Magnesium Sulfate 20 

meq/Calcium Gluconate 15 meq/ Multivitamins 10 ml/Chromium/ Copper/Manganese/ 

Seleni/Zn 0.5 ml/ Insulin Human Regular 35 unit/ Total Parenteral 

Nutrition/Amino Acids/Dextrose/ Fat Emulsion Intravenous 1,400 ml @  58.333 mls/

hr TPN  CONT IV  Last administered on 4/22/20at 22:27;  Start 4/22/20 at 22:00; 

Stop 4/23/20 at 21:59;  Status DC


Daptomycin 430 mg/ Sodium Chloride 50 ml @  100 mls/hr Q24H IV  Last 

administered on 4/24/20at 15:07;  Start 4/22/20 at 13:00;  Stop 4/25/20 at 

13:15;  Status DC


Sodium Chloride 100 meq/Potassium Chloride 40 meq/ Magnesium Sulfate 20 

meq/Calcium Gluconate 10 meq/ Multivitamins 10 ml/Chromium/ Copper/Manganese/ 

Seleni/Zn 0.5 ml/ Insulin Human Regular 35 unit/ Total Parenteral 

Nutrition/Amino Acids/Dextrose/ Fat Emulsion Intravenous 1,400 ml @  58.333 mls/

hr TPN  CONT IV  Last administered on 4/24/20at 00:06;  Start 4/23/20 at 22:00; 

Stop 4/24/20 at 21:59;  Status DC


Alteplase, Recombinant (Cathflo For Central Catheter Clearance) 1 mg 1X  ONCE 

INT CAT  Last administered on 4/24/20at 11:44;  Start 4/24/20 at 10:45;  Stop 

4/24/20 at 10:46;  Status DC


Ondansetron HCl (Zofran) 4 mg PRN Q6HRS  PRN IV NAUSEA/VOMITING;  Start 4/27/20 

at 07:00;  Stop 4/28/20 at 06:59;  Status DC


Fentanyl Citrate (Fentanyl 2ml Vial) 25 mcg PRN Q5MIN  PRN IV MILD PAIN 1-3;  

Start 4/27/20 at 07:00;  Stop 4/28/20 at 06:59;  Status DC


Fentanyl Citrate (Fentanyl 2ml Vial) 50 mcg PRN Q5MIN  PRN IV MODERATE TO SEVERE

PAIN Last administered on 4/27/20at 10:17;  Start 4/27/20 at 07:00;  Stop 

4/28/20 at 06:59;  Status DC


Ringer's Solution 1,000 ml @  30 mls/hr Q24H IV ;  Start 4/27/20 at 07:00;  Stop

4/27/20 at 18:59;  Status DC


Lidocaine HCl (Xylocaine-Mpf 1% 2ml Vial) 2 ml PRN 1X  PRN ID PRIOR TO IV START;

 Start 4/27/20 at 07:00;  Stop 4/28/20 at 06:59;  Status DC


Prochlorperazine Edisylate (Compazine) 5 mg PACU PRN  PRN IV NAUSEA, MRX1;  

Start 4/27/20 at 07:00;  Stop 4/28/20 at 06:59;  Status DC


Sodium Acetate 50 meq/Potassium Acetate 55 meq/ Magnesium Sulfate 20 meq/Calcium

Gluconate 10 meq/ Multivitamins 10 ml/Chromium/ Copper/Manganese/ Seleni/Zn 0.5 

ml/ Insulin Human Regular 35 unit/ Total Parenteral Nutrition/Amino 

Acids/Dextrose/ Fat Emulsion Intravenous 1,400 ml @  58.333 mls/ hr TPN  CONT IV

;  Start 4/24/20 at 22:00;  Stop 4/24/20 at 14:15;  Status DC


Sodium Acetate 50 meq/Potassium Acetate 55 meq/ Magnesium Sulfate 20 meq/Calcium

Gluconate 10 meq/ Multivitamins 10 ml/Chromium/ Copper/Manganese/ Seleni/Zn 0.5 

ml/ Insulin Human Regular 35 unit/ Total Parenteral Nutrition/Amino 

Acids/Dextrose/ Fat Emulsion Intravenous 1,800 ml @  75 mls/hr TPN  CONT IV  

Last administered on 4/24/20at 22:38;  Start 4/24/20 at 22:00;  Stop 4/25/20 at 

21:59;  Status DC


Sodium Chloride 1,000 ml @  1,000 mls/hr Q1H PRN IV hypotension;  Start 4/24/20 

at 15:31;  Stop 4/24/20 at 21:30;  Status DC


Diphenhydramine HCl (Benadryl) 25 mg 1X PRN  PRN IV ITCHING;  Start 4/24/20 at 

15:45;  Stop 4/25/20 at 15:44;  Status DC


Diphenhydramine HCl (Benadryl) 25 mg 1X PRN  PRN IV ITCHING;  Start 4/24/20 at 

15:45;  Stop 4/25/20 at 15:44;  Status DC


Sodium Chloride 1,000 ml @  400 mls/hr Q2H30M PRN IV PATENCY;  Start 4/24/20 at 

15:31;  Stop 4/25/20 at 03:30;  Status DC


Info (PHARMACY MONITORING -- do not chart) 1 each PRN DAILY  PRN MC SEE 

COMMENTS;  Start 4/24/20 at 15:45


Sodium Acetate 50 meq/Potassium Acetate 55 meq/ Magnesium Sulfate 20 meq/Calcium

Gluconate 10 meq/ Multivitamins 10 ml/Chromium/ Copper/Manganese/ Seleni/Zn 0.5 

ml/ Insulin Human Regular 35 unit/ Total Parenteral Nutrition/Amino 

Acids/Dextrose/ Fat Emulsion Intravenous 1,800 ml @  75 mls/hr TPN  CONT IV  

Last administered on 4/25/20at 22:03;  Start 4/25/20 at 22:00;  Stop 4/26/20 at 

21:59;  Status DC


Daptomycin 430 mg/ Sodium Chloride 50 ml @  100 mls/hr Q24H IV  Last 

administered on 4/30/20at 13:00;  Start 4/25/20 at 13:00;  Stop 4/30/20 at 

20:58;  Status DC


Heparin Sodium (Porcine) 1000 unit/Sodium Chloride 1,001 ml @  1,001 mls/hr 1X  

ONCE IRR ;  Start 4/27/20 at 06:00;  Stop 4/27/20 at 06:59;  Status DC


Potassium Acetate 55 meq/Magnesium Sulfate 20 meq/ Calcium Gluconate 10 meq/ 

Multivitamins 10 ml/Chromium/ Copper/Manganese/ Seleni/Zn 0.5 ml/ Insulin Human 

Regular 35 unit/ Total Parenteral Nutrition/Amino Acids/Dextrose/ Fat Emulsion 

Intravenous 1,920 ml @  80 mls/hr TPN  CONT IV  Last administered on 4/26/20at 

22:10;  Start 4/26/20 at 22:00;  Stop 4/27/20 at 21:59;  Status DC


Dexamethasone Sodium Phosphate (Decadron) 4 mg STK-MED ONCE .ROUTE ;  Start 

4/27/20 at 10:56;  Stop 4/27/20 at 10:57;  Status DC


Ondansetron HCl (Zofran) 4 mg STK-MED ONCE .ROUTE ;  Start 4/27/20 at 10:56;  

Stop 4/27/20 at 10:57;  Status DC


Rocuronium Bromide (Zemuron) 50 mg STK-MED ONCE .ROUTE ;  Start 4/27/20 at 

10:56;  Stop 4/27/20 at 10:57;  Status DC


Fentanyl Citrate (Fentanyl 2ml Vial) 100 mcg STK-MED ONCE .ROUTE ;  Start 

4/27/20 at 10:56;  Stop 4/27/20 at 10:57;  Status DC


Bupivacaine HCl/ Epinephrine Bitart (Sensorcain-Epi 0.5%-1:466649 Mpf) 30 ml 

STK-MED ONCE .ROUTE  Last administered on 4/27/20at 12:01;  Start 4/27/20 at 

10:58;  Stop 4/27/20 at 10:58;  Status DC


Cellulose (Surgicel Hemostat 2x14) 1 each STK-MED ONCE .ROUTE ;  Start 4/27/20 

at 10:58;  Stop 4/27/20 at 10:59;  Status DC


Iohexol (Omnipaque 300 Mg/ml) 50 ml STK-MED ONCE .ROUTE ;  Start 4/27/20 at 

10:58;  Stop 4/27/20 at 10:59;  Status DC


Cellulose (Surgicel Hemostat 4x8) 1 each STK-MED ONCE .ROUTE ;  Start 4/27/20 at

10:58;  Stop 4/27/20 at 10:59;  Status DC


Bisacodyl (Dulcolax Supp) 10 mg STK-MED ONCE .ROUTE ;  Start 4/27/20 at 10:59;  

Stop 4/27/20 at 10:59;  Status DC


Heparin Sodium (Porcine) 1000 unit/Sodium Chloride 1,001 ml @  1,001 mls/hr 1X  

ONCE IRR ;  Start 4/27/20 at 12:00;  Stop 4/27/20 at 12:59;  Status DC


Propofol 20 ml @ As Directed STK-MED ONCE IV ;  Start 4/27/20 at 11:05;  Stop 

4/27/20 at 11:05;  Status DC


Sevoflurane (Ultane) 90 ml STK-MED ONCE IH ;  Start 4/27/20 at 11:05;  Stop 

4/27/20 at 11:05;  Status DC


Sevoflurane (Ultane) 60 ml STK-MED ONCE IH ;  Start 4/27/20 at 12:26;  Stop 

4/27/20 at 12:27;  Status DC


Propofol 20 ml @ As Directed STK-MED ONCE IV ;  Start 4/27/20 at 12:26;  Stop 

4/27/20 at 12:27;  Status DC


Phenylephrine HCl (PHENYLEPHRINE in 0.9% NACL PF) 1 mg STK-MED ONCE IV ;  Start 

4/27/20 at 12:34;  Stop 4/27/20 at 12:34;  Status DC


Heparin Sodium (Porcine) (Heparin Sodium) 5,000 unit Q12HR SQ  Last administered

on 5/5/20at 22:04;  Start 4/27/20 at 21:00


Sodium Chloride (Normal Saline Flush) 3 ml QSHIFT  PRN IV AFTER MEDS AND BLOOD 

DRAWS;  Start 4/27/20 at 13:45


Naloxone HCl (Narcan) 0.4 mg PRN Q2MIN  PRN IV SEE INSTRUCTIONS;  Start 4/27/20 

at 13:45


Sodium Chloride 1,000 ml @  25 mls/hr Q24H IV  Last administered on 5/6/20at 

13:59;  Start 4/27/20 at 13:37


Naloxone HCl (Narcan) 0.4 mg PRN Q2MIN  PRN IV SEE INSTRUCTIONS;  Start 4/27/20 

at 14:30;  Status UNV


Sodium Chloride 1,000 ml @  25 mls/hr Q24H IV ;  Start 4/27/20 at 14:30;  Status

UNV


Hydromorphone HCl 30 ml @ 0 mls/hr CONT PRN  PRN IV PER PROTOCOL Last a

dministered on 5/2/20at 16:08;  Start 4/27/20 at 14:30;  Stop 5/4/20 at 08:55;  

Status DC


Potassium Acetate 55 meq/Magnesium Sulfate 20 meq/ Calcium Gluconate 10 meq/ 

Multivitamins 10 ml/Chromium/ Copper/Manganese/ Seleni/Zn 0.5 ml/ Insulin Human 

Regular 35 unit/ Total Parenteral Nutrition/Amino Acids/Dextrose/ Fat Emulsion 

Intravenous 1,920 ml @  80 mls/hr TPN  CONT IV  Last administered on 4/27/20at 

22:01;  Start 4/27/20 at 22:00;  Stop 4/28/20 at 21:59;  Status DC


Bumetanide (Bumex) 2 mg BID92 IV  Last administered on 5/1/20at 13:50;  Start 

4/28/20 at 14:00;  Stop 5/2/20 at 14:10;  Status DC


Meropenem 1 gm/ Sodium Chloride 100 ml @  200 mls/hr Q8HRS IV  Last administered

on 5/6/20at 13:55;  Start 4/28/20 at 14:00


Potassium Acetate 55 meq/Magnesium Sulfate 20 meq/ Calcium Gluconate 10 meq/ 

Multivitamins 10 ml/Chromium/ Copper/Manganese/ Seleni/Zn 0.5 ml/ Insulin Human 

Regular 35 unit/ Total Parenteral Nutrition/Amino Acids/Dextrose/ Fat Emulsion 

Intravenous 1,920 ml @  80 mls/hr TPN  CONT IV  Last administered on 4/28/20at 

22:02;  Start 4/28/20 at 22:00;  Stop 4/29/20 at 21:59;  Status DC


Hydromorphone HCl (Dilaudid Standard PCA) 12 mg STK-MED ONCE IV ;  Start 4/27/20

at 14:35;  Stop 4/28/20 at 13:53;  Status DC


Artificial Tears (Artificial Tears) 1 drop PRN Q15MIN  PRN OU DRY EYE Last 

administered on 5/5/20at 09:10;  Start 4/29/20 at 05:30


Hydromorphone HCl (Dilaudid Standard PCA) 12 mg STK-MED ONCE IV ;  Start 4/28/20

at 12:05;  Stop 4/29/20 at 09:15;  Status DC


Potassium Acetate 65 meq/Magnesium Sulfate 20 meq/ Calcium Gluconate 10 meq/ 

Multivitamins 10 ml/Chromium/ Copper/Manganese/ Seleni/Zn 0.5 ml/ Insulin Human 

Regular 30 unit/ Total Parenteral Nutrition/Amino Acids/Dextrose/ Fat Emulsion 

Intravenous 1,920 ml @  80 mls/hr TPN  CONT IV  Last administered on 4/29/20at 

22:22;  Start 4/29/20 at 22:00;  Stop 4/30/20 at 21:59;  Status DC


Cyclobenzaprine HCl (Flexeril) 10 mg PRN Q6HRS  PRN PO MUSCLE SPASMS;  Start 

4/30/20 at 10:45


Potassium Acetate 55 meq/Magnesium Sulfate 20 meq/ Calcium Gluconate 10 meq/ 

Multivitamins 10 ml/Chromium/ Copper/Manganese/ Seleni/Zn 0.5 ml/ Insulin Human 

Regular 30 unit/ Total Parenteral Nutrition/Amino Acids/Dextrose/ Fat Emulsion 

Intravenous 1,920 ml @  80 mls/hr TPN  CONT IV  Last administered on 5/1/20at 

01:00;  Start 4/30/20 at 22:00;  Stop 5/1/20 at 21:59;  Status DC


Magnesium Sulfate 50 ml @ 25 mls/hr 1X  ONCE IV  Last administered on 4/30/20at 

17:18;  Start 4/30/20 at 12:45;  Stop 4/30/20 at 14:44;  Status DC


Potassium Chloride/Water 100 ml @  100 mls/hr 1X  ONCE IV  Last administered on 

5/1/20at 11:27;  Start 5/1/20 at 12:00;  Stop 5/1/20 at 12:59;  Status DC


Hydromorphone HCl (Dilaudid Standard PCA) 12 mg STK-MED ONCE IV ;  Start 4/29/20

at 10:50;  Stop 5/1/20 at 11:02;  Status DC


Hydromorphone HCl (Dilaudid Standard PCA) 12 mg STK-MED ONCE IV ;  Start 4/30/20

at 13:47;  Stop 5/1/20 at 11:03;  Status DC


Potassium Acetate 30 meq/Magnesium Sulfate 20 meq/ Calcium Gluconate 10 meq/ 

Multivitamins 10 ml/Chromium/ Copper/Manganese/ Seleni/Zn 0.5 ml/ Insulin Human 

Regular 30 unit/ Potassium Chloride 30 meq/ Total Parenteral Nutrition/Amino 

Acids/Dextrose/ Fat Emulsion Intravenous 1,920 ml @  80 mls/hr TPN  CONT IV  

Last administered on 5/1/20at 22:34;  Start 5/1/20 at 22:00;  Stop 5/2/20 at 

21:59;  Status DC


Potassium Chloride/Water 100 ml @  100 mls/hr Q1H IV  Last administered on 

5/2/20at 13:05;  Start 5/2/20 at 07:00;  Stop 5/2/20 at 10:59;  Status DC


Magnesium Sulfate 50 ml @ 25 mls/hr 1X  ONCE IV  Last administered on 5/2/20at 

10:34;  Start 5/2/20 at 10:30;  Stop 5/2/20 at 12:29;  Status DC


Potassium Chloride 75 meq/ Magnesium Sulfate 20 meq/Calcium Gluconate 10 meq/ 

Multivitamins 10 ml/Chromium/ Copper/Manganese/ Seleni/Zn 0.5 ml/ Insulin Human 

Regular 30 unit/ Total Parenteral Nutrition/Amino Acids/Dextrose/ Fat Emulsion 

Intravenous 1,920 ml @  80 mls/hr TPN  CONT IV  Last administered on 5/2/20at 

21:51;  Start 5/2/20 at 22:00;  Stop 5/3/20 at 22:00;  Status DC


Potassium Chloride 75 meq/ Magnesium Sulfate 20 meq/Calcium Gluconate 10 meq/ 

Multivitamins 10 ml/Chromium/ Copper/Manganese/ Seleni/Zn 0.5 ml/ Insulin Human 

Regular 25 unit/ Total Parenteral Nutrition/Amino Acids/Dextrose/ Fat Emulsion 

Intravenous 1,920 ml @  80 mls/hr TPN  CONT IV  Last administered on 5/3/20at 

22:04;  Start 5/3/20 at 22:00;  Stop 5/4/20 at 21:59;  Status DC


Hydromorphone HCl (Dilaudid) 0.4 mg PRN Q4HRS  PRN IVP PAIN Last administered on

5/4/20at 10:57;  Start 5/4/20 at 09:00;  Stop 5/4/20 at 18:59;  Status DC


Micafungin Sodium 100 mg/Dextrose 100 ml @  100 mls/hr Q24H IV  Last 

administered on 5/6/20at 10:44;  Start 5/4/20 at 11:00


Daptomycin 485 mg/ Sodium Chloride 50 ml @  100 mls/hr Q24H IV  Last 

administered on 5/6/20at 09:51;  Start 5/4/20 at 11:00


Potassium Chloride 75 meq/ Magnesium Sulfate 15 meq/Calcium Gluconate 8 meq/ 

Multivitamins 10 ml/Chromium/ Copper/Manganese/ Seleni/Zn 0.5 ml/ Insulin Human 

Regular 25 unit/ Total Parenteral Nutrition/Amino Acids/Dextrose/ Fat Emulsion 

Intravenous 1,920 ml @  80 mls/hr TPN  CONT IV  Last administered on 5/4/20at 

23:08;  Start 5/4/20 at 22:00;  Stop 5/5/20 at 21:59;  Status DC


Haloperidol Lactate (Haldol Inj) 3 mg 1X  ONCE IVP  Last administered on 

5/4/20at 14:37;  Start 5/4/20 at 14:30;  Stop 5/4/20 at 14:31;  Status DC


Hydromorphone HCl (Dilaudid) 1 mg PRN Q4HRS  PRN IVP PAIN Last administered on 

5/6/20at 13:14;  Start 5/4/20 at 19:00


Potassium Chloride 75 meq/ Magnesium Sulfate 15 meq/Calcium Gluconate 8 meq/ 

Multivitamins 10 ml/Chromium/ Copper/Manganese/ Seleni/Zn 0.5 ml/ Insulin Human 

Regular 20 unit/ Total Parenteral Nutrition/Amino Acids/Dextrose/ Fat Emulsion 

Intravenous 1,920 ml @  80 mls/hr TPN  CONT IV  Last administered on 5/5/20at 

22:10;  Start 5/5/20 at 22:00;  Stop 5/6/20 at 21:59


Lidocaine HCl (Buffered Lidocaine 1%) 3 ml STK-MED ONCE .ROUTE ;  Start 5/6/20 

at 11:31;  Stop 5/6/20 at 11:31;  Status DC


Lidocaine HCl (Buffered Lidocaine 1%) 3 ml STK-MED ONCE .ROUTE ;  Start 5/6/20 

at 12:28;  Stop 5/6/20 at 12:29;  Status DC


Lidocaine HCl (Buffered Lidocaine 1%) 6 ml 1X  ONCE INJ  Last administered on 

5/6/20at 12:53;  Start 5/6/20 at 12:45;  Stop 5/6/20 at 12:46;  Status DC


Potassium Chloride 75 meq/ Magnesium Sulfate 15 meq/Calcium Gluconate 8 meq/ 

Multivitamins 10 ml/Chromium/ Copper/Manganese/ Seleni/Zn 0.5 ml/ Insulin Human 

Regular 20 unit/ Total Parenteral Nutrition/Amino Acids/Dextrose/ Fat Emulsion 

Intravenous 1,920 ml @  80 mls/hr TPN  CONT IV ;  Start 5/6/20 at 22:00;  Stop 

5/7/20 at 21:59





Active Scripts


Active


Reported


Bisoprolol Fumarate 5 Mg Tablet 10 Mg PO DAILY


Vitals/I & O





Vital Sign - Last 24 Hours








 5/5/20 5/5/20 5/5/20 5/5/20





 16:00 16:00 16:30 17:00


 


Pulse 102   108


 


Resp 32  16 32


 


B/P (MAP) 132/63 (86)   145/74 (97)


 


Pulse Ox 99  99 99


 


O2 Delivery Tracheal Collar Trach Collar  Tracheal Collar


 


O2 Flow Rate  10.0 10.0 





 5/5/20 5/5/20 5/5/20 5/5/20





 18:05 19:00 19:36 19:38


 


Temp 99.9   





 99.9   


 


Pulse 125 126  


 


Resp 41 42  40


 


B/P (MAP) 164/94 (117) 172/89 (116)  


 


Pulse Ox 100 98 100 99


 


O2 Delivery Tracheal Collar Tracheal Collar Tracheal Collar Tracheal Collar


 


O2 Flow Rate    10.0


 


    





    





 5/5/20 5/5/20 5/5/20 5/5/20





 20:00 20:00 21:00 22:00


 


Temp  99.7  





  99.7  


 


Pulse  118 122 122


 


Resp  30 30 35


 


B/P (MAP)  139/71 (93) 145/83 (103) 156/95 (115)


 


Pulse Ox  97 95 92


 


O2 Delivery Trach Collar Tracheal Collar Tracheal Collar Tracheal Collar


 


O2 Flow Rate 8.0   


 


    





    





 5/5/20 5/5/20 5/6/20 5/6/20





 23:00 23:50 00:00 00:00


 


Temp    100.2





    100.2


 


Pulse 122   126


 


Resp 37   36


 


B/P (MAP) 155/79 (104)   158/92 (114)


 


Pulse Ox 95 100  95


 


O2 Delivery Tracheal Collar Tracheal Collar Trach Collar Tracheal Collar


 


O2 Flow Rate  8.0 8.0 


 


    





    





 5/6/20 5/6/20 5/6/20 5/6/20





 00:03 01:00 02:00 02:10


 


Pulse  134 120 


 


Resp 33 32 30 


 


B/P (MAP)  174/92 (119) 128/65 (86) 


 


Pulse Ox 94 93 94 98


 


O2 Delivery Tracheal Collar Tracheal Collar Tracheal Collar Ventilator


 


O2 Flow Rate 8.0   





 5/6/20 5/6/20 5/6/20 5/6/20





 03:00 04:00 04:00 04:15


 


Temp   99.9 





   99.9 


 


Pulse 106  102 


 


Resp 26  27 


 


B/P (MAP) 112/64 (80)  119/59 (79) 


 


Pulse Ox 94  97 98


 


O2 Delivery Spontaneous Mechanical Ventilator Spontaneous Ventilator


 


    





    





 5/6/20 5/6/20 5/6/20 5/6/20





 05:00 06:00 06:53 07:00


 


Temp    101.5





    101.5


 


Pulse 106 112  132


 


Resp 27 29 44 39


 


B/P (MAP) 131/59 (83) 140/77 (98)  158/80 (106)


 


Pulse Ox 97 93 95 94


 


O2 Delivery Spontaneous Spontaneous Spontaneous Spontaneous


 


    





    





 5/6/20 5/6/20 5/6/20 5/6/20





 08:00 08:00 08:03 08:36


 


Pulse  100  


 


Resp  21  20


 


B/P (MAP)  115/61 (79)  


 


Pulse Ox  97 96 97


 


O2 Delivery Mechanical Ventilator Spontaneous Ventilator Ventilator





 5/6/20 5/6/20 5/6/20 5/6/20





 09:00 10:00 10:00 11:00


 


Temp   98.4 





   98.4 


 


Pulse 93  94 108


 


Resp 24  34 33


 


B/P (MAP) 117/64 (81)  90/65 (73) 160/80 (106)


 


Pulse Ox 97 98 96 98


 


O2 Delivery Spontaneous Tracheal Collar Tracheal Collar Tracheal Collar


 


O2 Flow Rate  8.0 8.0 8.0


 


    





    





 5/6/20 5/6/20 5/6/20 5/6/20





 11:32 11:41 11:50 12:00


 


Temp   98.4 98.8





   98.4 98.8


 


Pulse   116 106


 


Resp   36 36


 


B/P (MAP)   160/80 162/71 (101)


 


Pulse Ox  96  99


 


O2 Delivery Trach Collar Tracheal Collar  Tracheal Collar


 


O2 Flow Rate 8.0 8.0  8.0


 


    





    





 5/6/20 5/6/20 5/6/20 5/6/20





 12:05 13:00 13:05 13:14


 


Temp 98.8  99.0 





 98.8  99.0 


 


Pulse 112 93 93 


 


Resp 36 32 32 29


 


B/P (MAP) 162/71 115/60 (78) 115/60 


 


Pulse Ox  97  99


 


O2 Delivery  Ventilator  


 


O2 Flow Rate    8.0


 


    





    





 5/6/20 5/6/20 5/6/20 5/6/20





 13:47 14:00 14:01 15:00


 


Temp   98.4 





   98.4 


 


Pulse  87 90 92


 


Resp 32 30 27 29


 


B/P (MAP)  102/59 (73) 109/52 110/65 (80)


 


Pulse Ox 30 96  99


 


O2 Delivery Ventilator Ventilator  Ventilator














Intake and Output   


 


 5/5/20 5/5/20 5/6/20





 15:00 23:00 07:00


 


Intake Total 100 ml 2216 ml 496 ml


 


Output Total 630 ml 645 ml 905 ml


 


Balance -530 ml 1571 ml -409 ml











Hemodynamically unstable?:  No


Is patient in severe pain?:  Yes


Is NPO status required?:  Yes











HIRAM BARRERA MD              May 6, 2020 15:38

## 2020-05-06 NOTE — PDOC
Provider Note


Provider Note


IR NOTE





Patient with severe pancreatic now with multiple intraabdominal fluid 

collections. Also noted to have body wall edema and bilateral pleural effusions.

There is a maturing pseudocyst between the stomach and pancreas, as well as 

bilateral retroperitoneal fluid collections. and pelvic fluid collections. These

collections, communicate to some degree. The largest collection is in the pelvis

and may be simple ascites.  Will plan on inially draining the pelvic fluid. 

Pancreatic collection would preferably be drained via cystgastrostomy, which 

would perhaps be more efficacious once the cyst has mature further. Could 

aspirate collections for Dx, but leaving a drain in place is not first line 

treatment for the pseudocyst. Will avoid leaving drain in place to minimize risk

of infection.





Hemodynamically unstable?:  No


Is patient in severe pain?:  Yes


Is NPO status required?:  Yes











MARQUISE MONTEJO MD                May 6, 2020 11:42

## 2020-05-06 NOTE — RAD
Ultrasound-guided paracentesis   5/6/2020 2:18 PM

  

Procedure: The risks and benefits of the procedure were discussed the patient.

 Informed consent was obtained. A timeout procedure was performed. Sonographic

evaluation of the abdomen was performed demonstrating ascites . Left lower

quadrant was prepped and draped using maximum sterile barrier technique.  1%

lidocaine without epinephrine was administered for local anesthesia. Real-time

ultrasonographic guidance was used in passing a 5 Armenian Yueh catheter into

the fluid collection.  0.4 L of opaque, debris-containing ascites was removed.

The catheter was removed and pressure held to achieve hemostasis. A sterile

dressing was applied.





Impression: Ultrasound-guided paracentesis

## 2020-05-06 NOTE — PDOC
Infectious Disease Note


Subjective


Subjective


Uncomfortable in abd ? less about same as yesterday


mild nausea still


Feels distended still -stable


on trach with vent


Discussed with RN





Vital Sign


Vital Signs





Vital Signs








  Date Time  Temp Pulse Resp B/P (MAP) Pulse Ox O2 Delivery O2 Flow Rate FiO2


 


5/6/20 08:03     96 Ventilator  


 


5/6/20 07:00 101.5 132 39 158/80 (106)    





 101.5       


 


5/6/20 00:03       8.0 











Physical Exam


PHYSICAL EXAM


GENERAL: Propped up in bed, Alert. weak appearing but not toxic. slight better


HEENT: Pupils equal, + NGT, oral cavity dry 


NECK:  Trach/vent 


LUNGS: rhonchi 


HEART:  S1, S2, regular 


ABDOMEN: Distended, hypoactive BS, drain placement (4/27 - serous fluid)


: [place] (4/14)


EXTREMITIES: Generalized edema, no cyanosis, SCDs bilaterally 


DERMATOLOGIC:  Warm and dry.  No generalized rash.  


CENTRAL NERVOUS SYSTEM: weak, mouthing words to simple questions 


PICC line in place April 30, 2020 clean


HDC has been removed


[facility] removed





Labs


Lab





Laboratory Tests








Test


 5/5/20


12:18 5/5/20


18:13 5/6/20


00:09 5/6/20


06:05


 


Glucose (Fingerstick)


 147 mg/dL


(70-99) 143 mg/dL


(70-99) 151 mg/dL


(70-99) 





 


White Blood Count


 


 


 


 8.8 x10^3/uL


(4.0-11.0)


 


Red Blood Count


 


 


 


 2.30 x10^6/uL


(3.50-5.40)


 


Hemoglobin


 


 


 


 6.8 g/dL


(12.0-15.5)


 


Hematocrit


 


 


 


 20.9 %


(36.0-47.0)


 


Mean Corpuscular Volume    91 fL () 


 


Mean Corpuscular Hemoglobin    29 pg (25-35) 


 


Mean Corpuscular Hemoglobin


Concent 


 


 


 32 g/dL


(31-37)


 


Red Cell Distribution Width


 


 


 


 18.8 %


(11.5-14.5)


 


Platelet Count


 


 


 


 416 x10^3/uL


(140-400)


 


Neutrophils (%) (Auto)    70 % (31-73) 


 


Lymphocytes (%) (Auto)    21 % (24-48) 


 


Monocytes (%) (Auto)    6 % (0-9) 


 


Eosinophils (%) (Auto)    3 % (0-3) 


 


Basophils (%) (Auto)    0 % (0-3) 


 


Neutrophils # (Auto)


 


 


 


 6.1 x10^3/uL


(1.8-7.7)


 


Lymphocytes # (Auto)


 


 


 


 1.9 x10^3/uL


(1.0-4.8)


 


Monocytes # (Auto)


 


 


 


 0.5 x10^3/uL


(0.0-1.1)


 


Eosinophils # (Auto)


 


 


 


 0.2 x10^3/uL


(0.0-0.7)


 


Basophils # (Auto)


 


 


 


 0.0 x10^3/uL


(0.0-0.2)


 


Sodium Level


 


 


 


 152 mmol/L


(136-145)


 


Potassium Level


 


 


 


 4.3 mmol/L


(3.5-5.1)


 


Chloride Level


 


 


 


 113 mmol/L


()


 


Carbon Dioxide Level


 


 


 


 31 mmol/L


(21-32)


 


Anion Gap    8 (6-14) 


 


Blood Urea Nitrogen


 


 


 


 38 mg/dL


(7-20)


 


Creatinine


 


 


 


 0.9 mg/dL


(0.6-1.0)


 


Estimated GFR


(Cockcroft-Gault) 


 


 


 66.5 





 


BUN/Creatinine Ratio    42 (6-20) 


 


Glucose Level


 


 


 


 137 mg/dL


(70-99)


 


Calcium Level


 


 


 


 9.0 mg/dL


(8.5-10.1)


 


Total Bilirubin


 


 


 


 0.4 mg/dL


(0.2-1.0)


 


Aspartate Amino Transf


(AST/SGOT) 


 


 


 35 U/L (15-37) 





 


Alanine Aminotransferase


(ALT/SGPT) 


 


 


 30 U/L (14-59) 





 


Alkaline Phosphatase


 


 


 


 84 U/L


()


 


Total Protein


 


 


 


 5.6 g/dL


(6.4-8.2)


 


Albumin


 


 


 


 1.8 g/dL


(3.4-5.0)


 


Albumin/Globulin Ratio    0.5 (1.0-1.7) 


 


Test


 5/6/20


06:09 


 


 





 


Glucose (Fingerstick)


 128 mg/dL


(70-99) 


 


 











Micro


U/S 5/4





IMPRESSION: Complex fluid collections within the right and lower 


quadrants. The abdominal visceral and vascular structures are not formally


assessed on this exam.





CT Scan 5/4





IMPRESSION:


 





1. Findings consistent with sequelae of severe acute pancreatitis with 


enlarging peripancreatic and intra-abdominal fluid collections as 


described


 


2. Interval placement of a drain in the ventral abdominal wall with and 


partial evacuation of the ventral extraperitoneal fluid collection 


previously evident.











4/27


Operative Note:


After obtaining informed consent, patient was taken to OR, induced under GETA 

and prepped in the usual fashion.  5 mm port placed umbilical and right mid 

abdomen, all under laparoscopic guidance.  Large amount of ascites encountered 

and aspirated off.  Fluid was clear with some whitish debris.  Viscera was 

completely locked in with obliteration of all planes, preventing any significant

exploration.  Copious irrigation.





Given patient's overall clinical improvement, favor against open procedure and 

attempt at cholecystectomy and/or necrosectomy, given high risk of 

complications.  Cholecystectomy will need to be performed, but favor waiting 3 

months.





19 ROBERT drain placed and secured with 3 0 nylon.  Skin repaired with 4 0 monocryl.

 Dressing placed.





Patient tolerated procedure well and sent to PACU in stable condition.  All 

counts correct.  Wound class is 4.
































CT Chest Abd/pelv 4/14


CT CHEST:


CT scan the chest was done without contrast. There is a tracheostomy tube 


in good position. There are large bilateral pleural effusions. There is 


atelectasis in both lung bases. There is no mediastinal adenopathy. Right 


arm PICC line extends to the superior vena cava. There is a temporary 


dialysis catheter extending to the vena cava near the atrium.


 


IMPRESSION:


1. Large bilateral effusions.


2. Atelectasis of both lungs.


 


End impression


 


CT abdomen pelvis


 


CT scan the abdomen pelvis was done following CT chest with contrast. NG 


tube extends into the stomach. Spleen is normal. There is no mass or 


hydronephrosis in the kidneys. There is a gallstone the gallbladder. A 


focal liver lesion is not identified. There is diffuse necrosis of the 


pancreas. There is peripancreatic fluid. The pattern is similar to the 


recent study. There is fluid in the paracolic gutters. There is 


retroperitoneal fluid surrounding the kidneys. Ascites extends into the 


pelvis. Ascites is similar to the prior study. There is no bowel 


obstruction. There is no free air.


 


IMPRESSION:


1. Diffuse pancreatic necrosis.


2. A extensive retroperitoneal fluid and inflammation.


3. Cholelithiasis.


4. Moderate to large volume ascites with little change.



































CT A/P, 4/9


IMPRESSION:


1. Increased ascites.


2. 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


at the pancreas


3. Cholelithiasis with thickening of the gallbladder wall.


4. Persistent pleural effusions and atelectasis in the lung bases.





Objective


Assessment


Fever  - intermittent could be from underlying pancreatitis blood cults 5/4 - 

neg so far


Abd distention - U/S and CT reviewed


Acute pancreatitis with persistent  necrosis


CT a/p 4/9


    Increased ascites. Persistent evidence of necrotizing pancreatitis with 

fluid and phlegmon


   at the pancreas


   4/27 status post ROBERT drain placement - yeast on cult; Cult line tip 4/30 - neg


Anemia 


Cholelithiasis with thickening of the gallbladder wall.


Leucocytosis improving


JED,Hyperkalemia, Metabolic acidosis off dialysis


Acute hypoxic resp failure ,bilateral pleural effusion and atelectasis


hypocalcemia 


Prediabetes


HTN


s/p trach





Plan


Plan of Care





Consult IR for eval of abd fluid drainage given fever and discomfort


Uncertain if may benefit from drainage of fluid pockets but they appear to be 

increasing in size on CT.  Await GI f/u.  Gen surgery note reviewed from today -

no mention of needing drainage


Blood cult times 2 5/4 pending


Add Micafungin 5/4 with yeast from 4/27 abd cult will have it worked up and 

sensitivity


Dose Dapto 5/4


CBC/BMP in am


cont merrem (4/8),


Electrolytes per primary 


Anemia per primary


Maintain aspiration precaution


PT and OT as tolerated


Continue supportive care





D/w nursing











RASHAWN ROSEN MD               May 6, 2020 08:32

## 2020-05-06 NOTE — NUR
SS following up with discharge planning. SS reviewed pt chart and discussed with pt RN. Pt 
hemoglobin low today and pt getting unit of blood. Pt had paracentesis today and fluid from 
paracentesis being sent out for cultures. Pt has had some issues with fevers and is 
currently on Dapto, Micafungin, and Meropenem. Pt is NPO until she can tolerate speaking 
valve. Pt having good urine output. Pt currently on TPN. HCFS meeting with pt's daughters 
tomorrow to complete disability packet. SS will continue to follow for discharge planning.

## 2020-05-07 VITALS — SYSTOLIC BLOOD PRESSURE: 115 MMHG | DIASTOLIC BLOOD PRESSURE: 60 MMHG

## 2020-05-07 VITALS — DIASTOLIC BLOOD PRESSURE: 72 MMHG | SYSTOLIC BLOOD PRESSURE: 134 MMHG

## 2020-05-07 VITALS — SYSTOLIC BLOOD PRESSURE: 150 MMHG | DIASTOLIC BLOOD PRESSURE: 81 MMHG

## 2020-05-07 VITALS — DIASTOLIC BLOOD PRESSURE: 51 MMHG | SYSTOLIC BLOOD PRESSURE: 97 MMHG

## 2020-05-07 VITALS — DIASTOLIC BLOOD PRESSURE: 58 MMHG | SYSTOLIC BLOOD PRESSURE: 101 MMHG

## 2020-05-07 VITALS — DIASTOLIC BLOOD PRESSURE: 74 MMHG | SYSTOLIC BLOOD PRESSURE: 140 MMHG

## 2020-05-07 VITALS — SYSTOLIC BLOOD PRESSURE: 95 MMHG | DIASTOLIC BLOOD PRESSURE: 56 MMHG

## 2020-05-07 VITALS — SYSTOLIC BLOOD PRESSURE: 117 MMHG | DIASTOLIC BLOOD PRESSURE: 73 MMHG

## 2020-05-07 VITALS — SYSTOLIC BLOOD PRESSURE: 133 MMHG | DIASTOLIC BLOOD PRESSURE: 72 MMHG

## 2020-05-07 VITALS — SYSTOLIC BLOOD PRESSURE: 124 MMHG | DIASTOLIC BLOOD PRESSURE: 71 MMHG

## 2020-05-07 VITALS — DIASTOLIC BLOOD PRESSURE: 58 MMHG | SYSTOLIC BLOOD PRESSURE: 104 MMHG

## 2020-05-07 VITALS — SYSTOLIC BLOOD PRESSURE: 151 MMHG | DIASTOLIC BLOOD PRESSURE: 83 MMHG

## 2020-05-07 VITALS — SYSTOLIC BLOOD PRESSURE: 101 MMHG | DIASTOLIC BLOOD PRESSURE: 56 MMHG

## 2020-05-07 VITALS — DIASTOLIC BLOOD PRESSURE: 72 MMHG | SYSTOLIC BLOOD PRESSURE: 132 MMHG

## 2020-05-07 VITALS — SYSTOLIC BLOOD PRESSURE: 95 MMHG | DIASTOLIC BLOOD PRESSURE: 60 MMHG

## 2020-05-07 VITALS — DIASTOLIC BLOOD PRESSURE: 60 MMHG | SYSTOLIC BLOOD PRESSURE: 96 MMHG

## 2020-05-07 VITALS — DIASTOLIC BLOOD PRESSURE: 72 MMHG | SYSTOLIC BLOOD PRESSURE: 148 MMHG

## 2020-05-07 VITALS — DIASTOLIC BLOOD PRESSURE: 95 MMHG | SYSTOLIC BLOOD PRESSURE: 179 MMHG

## 2020-05-07 VITALS — DIASTOLIC BLOOD PRESSURE: 62 MMHG | SYSTOLIC BLOOD PRESSURE: 127 MMHG

## 2020-05-07 VITALS — SYSTOLIC BLOOD PRESSURE: 142 MMHG | DIASTOLIC BLOOD PRESSURE: 79 MMHG

## 2020-05-07 VITALS — SYSTOLIC BLOOD PRESSURE: 136 MMHG | DIASTOLIC BLOOD PRESSURE: 78 MMHG

## 2020-05-07 VITALS — SYSTOLIC BLOOD PRESSURE: 116 MMHG | DIASTOLIC BLOOD PRESSURE: 65 MMHG

## 2020-05-07 VITALS — SYSTOLIC BLOOD PRESSURE: 151 MMHG | DIASTOLIC BLOOD PRESSURE: 87 MMHG

## 2020-05-07 VITALS — DIASTOLIC BLOOD PRESSURE: 66 MMHG | SYSTOLIC BLOOD PRESSURE: 140 MMHG

## 2020-05-07 LAB
ANION GAP SERPL CALC-SCNC: 5 MMOL/L (ref 6–14)
BASOPHILS # BLD AUTO: 0 X10^3/UL (ref 0–0.2)
BASOPHILS NFR BLD: 0 % (ref 0–3)
BUN SERPL-MCNC: 39 MG/DL (ref 7–20)
CALCIUM SERPL-MCNC: 8.4 MG/DL (ref 8.5–10.1)
CHLORIDE SERPL-SCNC: 116 MMOL/L (ref 98–107)
CO2 SERPL-SCNC: 31 MMOL/L (ref 21–32)
CREAT SERPL-MCNC: 1 MG/DL (ref 0.6–1)
EOSINOPHIL NFR BLD: 0.1 X10^3/UL (ref 0–0.7)
EOSINOPHIL NFR BLD: 1 % (ref 0–3)
ERYTHROCYTE [DISTWIDTH] IN BLOOD BY AUTOMATED COUNT: 17.5 % (ref 11.5–14.5)
GFR SERPLBLD BASED ON 1.73 SQ M-ARVRAT: 58.9 ML/MIN
GLUCOSE SERPL-MCNC: 153 MG/DL (ref 70–99)
HCT VFR BLD CALC: 21.9 % (ref 36–47)
HGB BLD-MCNC: 7.1 G/DL (ref 12–15.5)
LYMPHOCYTES # BLD: 1.9 X10^3/UL (ref 1–4.8)
LYMPHOCYTES NFR BLD AUTO: 21 % (ref 24–48)
MAGNESIUM SERPL-MCNC: 2 MG/DL (ref 1.8–2.4)
MCH RBC QN AUTO: 29 PG (ref 25–35)
MCHC RBC AUTO-ENTMCNC: 33 G/DL (ref 31–37)
MCV RBC AUTO: 90 FL (ref 79–100)
MONO #: 0.5 X10^3/UL (ref 0–1.1)
MONOCYTES NFR BLD: 5 % (ref 0–9)
NEUT #: 6.6 X10^3/UL (ref 1.8–7.7)
NEUTROPHILS NFR BLD AUTO: 73 % (ref 31–73)
PHOSPHATE SERPL-MCNC: 3.3 MG/DL (ref 2.6–4.7)
PLATELET # BLD AUTO: 382 X10^3/UL (ref 140–400)
POTASSIUM SERPL-SCNC: 4.2 MMOL/L (ref 3.5–5.1)
RBC # BLD AUTO: 2.44 X10^6/UL (ref 3.5–5.4)
SODIUM SERPL-SCNC: 152 MMOL/L (ref 136–145)
WBC # BLD AUTO: 9.1 X10^3/UL (ref 4–11)

## 2020-05-07 RX ADMIN — BACITRACIN SCH MLS/HR: 5000 INJECTION, POWDER, FOR SOLUTION INTRAMUSCULAR at 13:56

## 2020-05-07 RX ADMIN — PROCHLORPERAZINE EDISYLATE PRN MG: 5 INJECTION INTRAMUSCULAR; INTRAVENOUS at 03:57

## 2020-05-07 RX ADMIN — DAPTOMYCIN SCH MLS/HR: 500 INJECTION, POWDER, LYOPHILIZED, FOR SOLUTION INTRAVENOUS at 11:29

## 2020-05-07 RX ADMIN — INSULIN LISPRO SCH UNITS: 100 INJECTION, SOLUTION INTRAVENOUS; SUBCUTANEOUS at 18:04

## 2020-05-07 RX ADMIN — INSULIN LISPRO SCH UNITS: 100 INJECTION, SOLUTION INTRAVENOUS; SUBCUTANEOUS at 11:52

## 2020-05-07 RX ADMIN — HYDROMORPHONE HYDROCHLORIDE PRN MG: 2 INJECTION INTRAMUSCULAR; INTRAVENOUS; SUBCUTANEOUS at 17:52

## 2020-05-07 RX ADMIN — MEROPENEM SCH MLS/HR: 1 INJECTION, POWDER, FOR SOLUTION INTRAVENOUS at 22:09

## 2020-05-07 RX ADMIN — HYDROMORPHONE HYDROCHLORIDE PRN MG: 2 INJECTION INTRAMUSCULAR; INTRAVENOUS; SUBCUTANEOUS at 22:08

## 2020-05-07 RX ADMIN — INSULIN LISPRO SCH UNITS: 100 INJECTION, SOLUTION INTRAVENOUS; SUBCUTANEOUS at 00:00

## 2020-05-07 RX ADMIN — HYDROMORPHONE HYDROCHLORIDE PRN MG: 2 INJECTION INTRAMUSCULAR; INTRAVENOUS; SUBCUTANEOUS at 08:39

## 2020-05-07 RX ADMIN — DEXMEDETOMIDINE HYDROCHLORIDE PRN MLS/HR: 100 INJECTION, SOLUTION, CONCENTRATE INTRAVENOUS at 16:18

## 2020-05-07 RX ADMIN — PANTOPRAZOLE SODIUM SCH MG: 40 INJECTION, POWDER, FOR SOLUTION INTRAVENOUS at 07:39

## 2020-05-07 RX ADMIN — INSULIN LISPRO SCH UNITS: 100 INJECTION, SOLUTION INTRAVENOUS; SUBCUTANEOUS at 05:48

## 2020-05-07 RX ADMIN — ONDANSETRON PRN MG: 2 INJECTION INTRAMUSCULAR; INTRAVENOUS at 08:07

## 2020-05-07 RX ADMIN — DEXMEDETOMIDINE HYDROCHLORIDE PRN MLS/HR: 100 INJECTION, SOLUTION, CONCENTRATE INTRAVENOUS at 07:40

## 2020-05-07 RX ADMIN — DEXMEDETOMIDINE HYDROCHLORIDE PRN MLS/HR: 100 INJECTION, SOLUTION, CONCENTRATE INTRAVENOUS at 12:25

## 2020-05-07 RX ADMIN — MEROPENEM SCH MLS/HR: 1 INJECTION, POWDER, FOR SOLUTION INTRAVENOUS at 13:57

## 2020-05-07 RX ADMIN — MEROPENEM SCH MLS/HR: 1 INJECTION, POWDER, FOR SOLUTION INTRAVENOUS at 05:59

## 2020-05-07 RX ADMIN — HYDROMORPHONE HYDROCHLORIDE PRN MG: 2 INJECTION INTRAMUSCULAR; INTRAVENOUS; SUBCUTANEOUS at 02:25

## 2020-05-07 RX ADMIN — Medication PRN EACH: at 13:25

## 2020-05-07 RX ADMIN — DEXTROSE SCH MLS/HR: 50 INJECTION, SOLUTION INTRAVENOUS at 12:11

## 2020-05-07 RX ADMIN — DEXMEDETOMIDINE HYDROCHLORIDE PRN MLS/HR: 100 INJECTION, SOLUTION, CONCENTRATE INTRAVENOUS at 19:49

## 2020-05-07 RX ADMIN — DEXMEDETOMIDINE HYDROCHLORIDE PRN MLS/HR: 100 INJECTION, SOLUTION, CONCENTRATE INTRAVENOUS at 02:38

## 2020-05-07 NOTE — RAD
EXAM: Abdomen, single view.

 

HISTORY: Nasogastric tube placement.

 

COMPARISON: 5/4/2020

 

FINDINGS: A view of the stomach is obtained. There is a nasogastric tube 

within the stomach. There are prominent air-filled bowel throughout the 

abdomen. The lung bases are excluded from the field-of-view.

 

IMPRESSION: Nasogastric tube within the stomach.

 

Electronically signed by: Irish Rios MD (5/7/2020 10:43 AM) [id number]

## 2020-05-07 NOTE — PDOC
SUBJECTIVE


ROS


Vomiting





OBJECTIVE


Vital Signs





Vital Signs








  Date Time  Temp Pulse Resp B/P (MAP) Pulse Ox O2 Delivery O2 Flow Rate FiO2


 


5/7/20 11:10     98 Ventilator  


 


5/7/20 10:00  82 25 117/73 (88)    


 


5/7/20 08:00 99.3       





 99.3       


 


5/7/20 04:00       30.0 








I & 0











Intake and Output 


 


 5/7/20





 07:00


 


Intake Total 2453.05 ml


 


Output Total 2135 ml


 


Balance 318.05 ml


 


 


 


Intake Oral 0 ml


 


IV Total 1793.05 ml


 


Blood Product IV Normal Saline Flush 660 ml


 


Output Urine Total 1680 ml


 


Drainage Total 430 ml


 


Other 25 ml











PHYSICAL EXAM


Physical Exam


GENERAL: NAD 


HEENT:  oral cavity dry 


NECK:  Trach/vent 


LUNGS: Non labored  


HEART:  S1, S2, regular 


ABDOMEN: Distended, hypoactive BS, drain placement (4/27 )


: [place] 


EXTREMITIES: Generalized edema,


DERMATOLOGIC: No generalized rash.





DIAGNOSIS/ASSESSMENT


Assessment & Plan


ARF-- ATN,requiring CRRT and HD 


Off HD, renal function improved    


 currently on TPN,, avoid nephrotoxins


CT 5/4-?  Developing right grade 2 hydronephrosis. No  radiopaque stones.bladder

obscured by ascites  


  


Anemia-  Currently on YOLI , Hgb dropped 





Hypernatremia -- stable ,





HyPokalemia- replace as needed 





Anasarca due to 3rd spacing , Diuretics prn 





Hyperkalemia- resolved  





Acute pancreatitis with persistent  necrosis


 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 

the pancreas


   4/27 status post ROBERT drain placement; 





 Cholelithiasis with possible cholecystitis.





Moderate pleural effusions, may be slightly improved. Infiltrate/atelectasis in 

both lung bases.


  


 Ascites - post paracentesis  in the past


 s/p  paracentesis 5/6  





Acute hypoxic resp failure ,bilateral pleural effusion


  Trach in place,


 


HTN 





COVID-19 neg, 3/26





COMMENT/RELEVANT DATA


Meds





Current Medications








 Medications


  (Trade)  Dose


 Ordered  Sig/Yee  Start Time


 Stop Time Status Last Admin


Dose Admin


 


 Acetaminophen


  (Tylenol Supp)  650 mg  PRN Q6HRS  PRN  3/24/20 10:30


    5/5/20 09:12


650 MG


 


 Acetaminophen


  (Tylenol)  650 mg  PRN Q6HRS  PRN  3/21/20 03:36


    4/16/20 19:56


650 MG


 


 Albumin Human  200 ml @ 


 200 mls/hr  1X PRN  PRN  4/21/20 08:00


 4/21/20 13:59 DC 4/21/20 08:40


200 MLS/HR


 


 Albuterol Sulfate


  (Ventolin Neb


 Soln)  2.5 mg  1X  ONCE  3/17/20 22:30


 3/17/20 22:31 DC 3/18/20 00:56


2.5 MG


 


 Alteplase,


 Recombinant


  (Cathflo For


 Central Catheter


 Clearance)  1 mg  1X  ONCE  4/24/20 10:45


 4/24/20 10:46 DC 4/24/20 11:44


1 MG


 


 Amino Acids/


 Glycerin/


 Electrolytes  1,000 ml @ 


 75 mls/hr  [id number]  4/20/20 21:15


   UNV  





 


 Artificial Tears


  (Artificial


 Tears)  1 drop  PRN Q15MIN  PRN  4/29/20 05:30


    5/5/20 09:10


1 DROP


 


 Atenolol


  (Tenormin)  100 mg  DAILY  3/17/20 09:00


 3/16/20 20:08 DC  





 


 Atropine Sulfate


  (ATROPINE 0.5mg


 SYRINGE)  0.5 mg  PRN Q5MIN  PRN  4/2/20 08:15


     





 


 Benzocaine


  (Hurricaine One)  1 spray  1X  ONCE  3/20/20 14:30


 3/20/20 14:31 DC 3/20/20 16:38


1 SPRAY


 


 Bisacodyl


  (Dulcolax Supp)  10 mg  STK-MED ONCE  4/27/20 10:59


 4/27/20 10:59 DC  





 


 Bumetanide


  (Bumex)  2 mg  [id number]  4/28/20 14:00


 5/2/20 14:10 DC 5/1/20 13:50


2 MG


 


 Bupivacaine HCl/


 Epinephrine Bitart


  (Sensorcain-Epi


 0.5%-1:417918 Mpf)  30 ml  STK-MED ONCE  4/27/20 10:58


 4/27/20 10:58 DC 4/27/20 12:01


7 ML


 


 Calcium Carbonate/


 Glycine


  (Tums)  500 mg  PRN [id number]  PRN  3/18/20 17:45


     





 


 Calcium Chloride


 1000 mg/Sodium


 Chloride  110 ml @ 


 220 mls/hr  1X  ONCE  3/17/20 22:30


 3/17/20 22:59 DC 3/17/20 22:11


220 MLS/HR


 


 Calcium Chloride


 3000 mg/Sodium


 Chloride  1,030 ml @ 


 50 mls/hr  [id number]  3/19/20 08:00


 3/21/20 15:23 DC 3/21/20 02:17


50 MLS/HR


 


 Calcium Gluconate


  (Calcium


 Gluconate)  2,000 mg  1X  ONCE  3/19/20 02:15


 3/19/20 02:16 DC 3/19/20 02:19


2,000 MG


 


 Calcium Gluconate


 1000 mg/Sodium


 Chloride  110 ml @ 


 220 mls/hr  1X  ONCE  3/18/20 03:30


 3/18/20 03:59 DC 3/18/20 03:21


220 MLS/HR


 


 Calcium Gluconate


 2000 mg/Sodium


 Chloride  120 ml @ 


 220 mls/hr  1X  ONCE  3/18/20 07:30


 3/18/20 08:02 DC 3/18/20 09:05


220 MLS/HR


 


 Cefepime HCl


  (Maxipime)  2 gm  Q12HR  3/25/20 09:00


 4/8/20 09:58 DC 4/7/20 20:56


2 GM


 


 Cellulose


  (Surgicel


 Fibrillar 1x2)  1 each  STK-MED ONCE  4/6/20 11:00


 4/6/20 11:01 DC  





 


 Cellulose


  (Surgicel


 Hemostat 2x14)  1 each  STK-MED ONCE  4/27/20 10:58


 4/27/20 10:59 DC  





 


 Cellulose


  (Surgicel


 Hemostat 4x8)  1 each  STK-MED ONCE  4/27/20 10:58


 4/27/20 10:59 DC  





 


 Cyclobenzaprine


 HCl


  (Flexeril)  10 mg  PRN Q6HRS  PRN  4/30/20 10:45


     





 


 Daptomycin 430 mg/


 Sodium Chloride  50 ml @ 


 100 mls/hr  Q24H  4/25/20 13:00


 4/30/20 20:58 DC 4/30/20 13:00


100 MLS/HR


 


 Daptomycin 485 mg/


 Sodium Chloride  50 ml @ 


 100 mls/hr  Q24H  5/4/20 11:00


    5/6/20 09:51


100 MLS/HR


 


 Daptomycin 500 mg/


 Sodium Chloride  50 ml @ 


 100 mls/hr  Q48H  3/25/20 08:30


 4/10/20 10:07 DC 4/10/20 09:57


100 MLS/HR


 


 Dexamethasone


 Sodium Phosphate


  (Decadron)  4 mg  STK-MED ONCE  4/27/20 10:56


 4/27/20 10:57 DC  





 


 Dexmedetomidine


 HCl 400 mcg/


 Sodium Chloride  100 ml @ 0


 mls/hr  CONT  PRN  4/2/20 08:15


    5/7/20 07:40


16.7 MLS/HR


 


 Dextrose


  (Dextrose


 50%-Water Syringe)  12.5 gm  PRN Q15MIN  PRN  3/16/20 09:30


     





 


 Digoxin


  (Lanoxin)  125 mcg  1X  ONCE  3/19/20 18:00


 3/19/20 18:01 DC 3/19/20 17:10


125 MCG


 


 Diphenhydramine


 HCl


  (Benadryl)  25 mg  1X PRN  PRN  4/24/20 15:45


 4/25/20 15:44 DC  





 


 Enoxaparin Sodium


  (Lovenox 100mg


 Syringe)  100 mg  Q12HR  4/21/20 21:00


   UNV  





 


 Etomidate


  (Amidate)  8 mg  1X  ONCE  3/23/20 08:30


 3/23/20 08:31 DC 3/23/20 08:33


8 MG


 


 Fentanyl Citrate


  (Fentanyl 2ml


 Vial)  100 mcg  STK-MED ONCE  4/27/20 10:56


 4/27/20 10:57 DC  





 


 Furosemide


  (Lasix)  40 mg  1X  ONCE  4/13/20 14:30


 4/13/20 14:31 DC 4/13/20 14:39


40 MG


 


 Haloperidol


 Lactate


  (Haldol Inj)  3 mg  1X  ONCE  5/4/20 14:30


 5/4/20 14:31 DC 5/4/20 14:37


3 MG


 


 Heparin Sodium


  (Porcine)


  (Hep Lock Adult)  500 unit  STK-MED ONCE  4/7/20 09:29


 4/7/20 09:30 DC  





 


 Heparin Sodium


  (Porcine)


  (Heparin Sodium)  5,000 unit  Q12HR  4/27/20 21:00


 5/7/20 09:59 DC 5/6/20 20:57


5,000 UNIT


 


 Heparin Sodium


  (Porcine) 1000


 unit/Sodium


 Chloride  1,001 ml @ 


 1,001 mls/hr  1X  ONCE  4/27/20 12:00


 4/27/20 12:59 DC  





 


 Hydromorphone HCl


  (Dilaudid


 Standard PCA)  12 mg  STK-MED ONCE  4/30/20 13:47


 5/1/20 11:03 DC  





 


 Hydromorphone HCl


  (Dilaudid)  1 mg  PRN Q4HRS  PRN  5/4/20 19:00


    5/7/20 08:39


1 MG


 


 Info


  (CONTRAST GIVEN


 -- Rx MONITORING)  1 each  PRN DAILY  PRN  3/30/20 11:45


 4/1/20 11:44 DC  





 


 Info


  (Icu Electrolyte


 Protocol)  1 ea  CONT PRN  PRN  3/29/20 13:15


     





 


 Info


  (PHARMACY


 MONITORING -- do


 not chart)  1 each  PRN DAILY  PRN  4/24/20 15:45


     





 


 Info


  (Tpn Per


 Pharmacy)  1 each  PRN DAILY  PRN  3/18/20 12:30


   UNV  





 


 Insulin Human


 Lispro


  (HumaLOG)  0-9 UNITS  Q6HRS  3/16/20 09:30


    5/6/20 13:12


4 UNITS


 


 Insulin Human


 Regular


  (HumuLIN R VIAL)  5 unit  1X  ONCE  3/17/20 22:30


 3/17/20 22:31 DC 3/17/20 22:14


5 UNIT


 


 Iohexol


  (Omnipaque 240


 Mg/ml)  30 ml  1X  ONCE  3/30/20 11:30


 3/30/20 11:33 DC 3/30/20 11:30


30 ML


 


 Iohexol


  (Omnipaque 300


 Mg/ml)  50 ml  STK-MED ONCE  4/27/20 10:58


 4/27/20 10:59 DC  





 


 Iohexol


  (Omnipaque 350


 Mg/ml)  90 ml  1X  ONCE  3/16/20 03:30


 3/16/20 03:31 DC 3/16/20 03:25


90 ML


 


 Ketorolac


 Tromethamine


  (Toradol 30mg


 Vial)  30 mg  1X  ONCE  3/16/20 03:00


 3/16/20 03:01 DC 3/16/20 02:54


30 MG


 


 Lidocaine HCl


  (Buffered


 Lidocaine 1%)  6 ml  1X  ONCE  5/6/20 12:45


 5/6/20 12:46 DC 5/6/20 12:53


6 ML


 


 Lidocaine HCl


  (Glydo


  (Lidocaine) Jelly)  1 thomas  1X  ONCE  3/20/20 14:30


 3/20/20 14:31 DC 3/20/20 16:38


1 THOMAS


 


 Lidocaine HCl


  (Xylocaine-Mpf


 1% 2ml Vial)  2 ml  PRN 1X  PRN  4/27/20 07:00


 4/28/20 06:59 DC  





 


 Linezolid/Dextrose  300 ml @ 


 300 mls/hr  Q12HR  4/10/20 11:00


 4/21/20 08:10 DC 4/20/20 20:40


300 MLS/HR


 


 Lorazepam


  (Ativan Inj)  1 mg  PRN Q4HRS  PRN  3/19/20 09:00


 4/17/20 09:19 DC 4/17/20 03:51


1 MG


 


 Magnesium Sulfate  50 ml @ 25


 mls/hr  1X  ONCE  5/2/20 10:30


 5/2/20 12:29 DC 5/2/20 10:34


25 MLS/HR


 


 Meropenem 1 gm/


 Sodium Chloride  100 ml @ 


 200 mls/hr  Q8HRS  4/28/20 14:00


    5/7/20 05:59


200 MLS/HR


 


 Meropenem 500 mg/


 Sodium Chloride  50 ml @ 


 100 mls/hr  Q12H  4/8/20 10:00


 4/28/20 12:37 DC 4/28/20 10:45


100 MLS/HR


 


 Metoprolol


 Tartrate


  (Lopressor Vial)  5 mg  Q6HRS  3/17/20 10:15


 3/28/20 08:48 DC 3/26/20 00:12


5 MG


 


 Metronidazole  100 ml @ 


 100 mls/hr  Q8HRS  4/14/20 10:00


 4/21/20 08:10 DC 4/21/20 06:04


100 MLS/HR


 


 Micafungin Sodium


 100 mg/Dextrose  100 ml @ 


 100 mls/hr  Q24H  5/4/20 11:00


    5/6/20 10:44


100 MLS/HR


 


 Midazolam HCl


  (Versed)  5 mg  1X  ONCE  3/23/20 08:30


 3/23/20 08:31 DC  





 


 Midazolam HCl 100


 mg/Sodium Chloride  100 ml @ 7


 mls/hr  CONT  PRN  3/28/20 16:00


    4/8/20 15:35


7 MLS/HR


 


 Midazolam HCl 50


 mg/Sodium Chloride  50 ml @ 0


 mls/hr  CONT  PRN  3/23/20 08:15


 3/28/20 15:59 DC 3/26/20 22:39


7 MLS/HR


 


 Morphine Sulfate


  (Morphine


 Sulfate)  2 mg  PRN Q2HR  PRN  3/16/20 05:00


 3/17/20 14:15 DC 3/17/20 12:26


2 MG


 


 Multi-Ingred


 Cream/Lotion/Oil/


 Oint


  (Artificial


 Tears Eye


 Ointment)  1 thomas  PRN Q1HR  PRN  3/25/20 17:30


    4/13/20 08:19


1 THOMAS


 


 Naloxone HCl


  (Narcan)  0.4 mg  PRN Q2MIN  PRN  4/27/20 14:30


   UNV  





 


 Norepinephrine


 Bitartrate 8 mg/


 Dextrose  258 ml @ 


 17.299 mls/


 hr  CONT  PRN  3/17/20 15:30


 4/17/20 09:19 DC 4/14/20 12:48


20.9 MLS/HR


 


 Ondansetron HCl


  (Zofran)  4 mg  STK-MED ONCE  4/27/20 10:56


 4/27/20 10:57 DC  





 


 Pantoprazole


 Sodium


  (PROTONIX VIAL


 for IV PUSH)  40 mg  DAILYAC  3/16/20 11:30


    5/7/20 07:39


40 MG


 


 Phenylephrine HCl


  (PHENYLEPHRINE


 in 0.9% NACL PF)  1 mg  STK-MED ONCE  4/27/20 12:34


 4/27/20 12:34 DC  





 


 Piperacillin Sod/


 Tazobactam Sod


 4.5 gm/Sodium


 Chloride  100 ml @ 


 200 mls/hr  1X  ONCE  3/16/20 06:00


 3/16/20 06:29 DC 3/16/20 05:44


200 MLS/HR


 


 Potassium


 Chloride 15 meq/


 Bicarbonate


 Dialysis Soln w/


 out KCl  5,007.5 ml


  @ 1,000 mls/


 hr  Q5H1M  3/29/20 20:00


 4/2/20 13:08 DC 4/1/20 18:14


1,000 MLS/HR


 


 Potassium


 Chloride 20 meq/


 Bicarbonate


 Dialysis Soln w/


 out KCl  5,010 ml @ 


 1,000 mls/hr  Q5H1M  3/25/20 16:00


 3/29/20 19:59 DC 3/29/20 14:54


1,000 MLS/HR


 


 Potassium


 Chloride 75 meq/


 Magnesium Sulfate


 15 meq/Calcium


 Gluconate 8 meq/


 Multivitamins 10


 ml/Chromium/


 Copper/Manganese/


 Seleni/Zn 0.5 ml/


 Insulin Human


 Regular 20 unit/


 Total Parenteral


 Nutrition/Amino


 Acids/Dextrose/


 Fat Emulsion


 Intravenous  1,920 ml @ 


 80 mls/hr  TPN  CONT  5/6/20 22:00


 5/7/20 21:59  5/6/20 22:00


80 MLS/HR


 


 Potassium


 Chloride 75 meq/


 Magnesium Sulfate


 15 meq/Calcium


 Gluconate 8 meq/


 Multivitamins 10


 ml/Chromium/


 Copper/Manganese/


 Seleni/Zn 0.5 ml/


 Insulin Human


 Regular 25 unit/


 Total Parenteral


 Nutrition/Amino


 Acids/Dextrose/


 Fat Emulsion


 Intravenous  1,920 ml @ 


 80 mls/hr  TPN  CONT  5/4/20 22:00


 5/5/20 21:59 DC 5/4/20 23:08


80 MLS/HR


 


 Potassium


 Chloride 75 meq/


 Magnesium Sulfate


 20 meq/Calcium


 Gluconate 10 meq/


 Multivitamins 10


 ml/Chromium/


 Copper/Manganese/


 Seleni/Zn 0.5 ml/


 Insulin Human


 Regular 25 unit/


 Total Parenteral


 Nutrition/Amino


 Acids/Dextrose/


 Fat Emulsion


 Intravenous  1,920 ml @ 


 80 mls/hr  TPN  CONT  5/3/20 22:00


 5/4/20 21:59 DC 5/3/20 22:04


80 MLS/HR


 


 Potassium


 Chloride 75 meq/


 Magnesium Sulfate


 20 meq/Calcium


 Gluconate 10 meq/


 Multivitamins 10


 ml/Chromium/


 Copper/Manganese/


 Seleni/Zn 0.5 ml/


 Insulin Human


 Regular 30 unit/


 Total Parenteral


 Nutrition/Amino


 Acids/Dextrose/


 Fat Emulsion


 Intravenous  1,920 ml @ 


 80 mls/hr  TPN  CONT  5/2/20 22:00


 5/3/20 22:00 DC 5/2/20 21:51


80 MLS/HR


 


 Potassium


 Chloride/Water  100 ml @ 


 100 mls/hr  Q1H  5/2/20 07:00


 5/2/20 10:59 DC 5/2/20 13:05


100 MLS/HR


 


 Potassium


 Phosphate 20 mmol/


 Sodium Chloride  106.6667


 ml @ 


 51.667 m...  1X  ONCE  3/25/20 13:00


 3/25/20 15:03 DC 3/25/20 12:51


51.667 MLS/HR


 


 Potassium Acetate


 30 meq/Magnesium


 Sulfate 20 meq/


 Calcium Gluconate


 10 meq/


 Multivitamins 10


 ml/Chromium/


 Copper/Manganese/


 Seleni/Zn 0.5 ml/


 Insulin Human


 Regular 30 unit/


 Potassium


 Chloride 30 meq/


 Total Parenteral


 Nutrition/Amino


 Acids/Dextrose/


 Fat Emulsion


 Intravenous  1,920 ml @ 


 80 mls/hr  TPN  CONT  5/1/20 22:00


 5/2/20 21:59 DC 5/1/20 22:34


80 MLS/HR


 


 Potassium Acetate


 55 meq/Magnesium


 Sulfate 20 meq/


 Calcium Gluconate


 10 meq/


 Multivitamins 10


 ml/Chromium/


 Copper/Manganese/


 Seleni/Zn 0.5 ml/


 Insulin Human


 Regular 30 unit/


 Total Parenteral


 Nutrition/Amino


 Acids/Dextrose/


 Fat Emulsion


 Intravenous  1,920 ml @ 


 80 mls/hr  TPN  CONT  4/30/20 22:00


 5/1/20 21:59 DC 5/1/20 01:00


80 MLS/HR


 


 Potassium Acetate


 55 meq/Magnesium


 Sulfate 20 meq/


 Calcium Gluconate


 10 meq/


 Multivitamins 10


 ml/Chromium/


 Copper/Manganese/


 Seleni/Zn 0.5 ml/


 Insulin Human


 Regular 35 unit/


 Total Parenteral


 Nutrition/Amino


 Acids/Dextrose/


 Fat Emulsion


 Intravenous  1,920 ml @ 


 80 mls/hr  TPN  CONT  4/28/20 22:00


 4/29/20 21:59 DC 4/28/20 22:02


80 MLS/HR


 


 Potassium Acetate


 65 meq/Magnesium


 Sulfate 20 meq/


 Calcium Gluconate


 10 meq/


 Multivitamins 10


 ml/Chromium/


 Copper/Manganese/


 Seleni/Zn 0.5 ml/


 Insulin Human


 Regular 30 unit/


 Total Parenteral


 Nutrition/Amino


 Acids/Dextrose/


 Fat Emulsion


 Intravenous  1,920 ml @ 


 80 mls/hr  TPN  CONT  4/29/20 22:00


 4/30/20 21:59 DC 4/29/20 22:22


80 MLS/HR


 


 Prochlorperazine


 Edisylate


  (Compazine)  5 mg  PACU PRN  PRN  4/27/20 07:00


 4/28/20 06:59 DC  





 


 Propofol  20 ml @ As


 Directed  STK-MED ONCE  4/27/20 12:26


 4/27/20 12:27 DC  





 


 Ringer's Solution  1,000 ml @ 


 30 mls/hr  Q24H  4/27/20 07:00


 4/27/20 18:59 DC  





 


 Rocuronium Bromide


  (Zemuron)  50 mg  STK-MED ONCE  4/27/20 10:56


 4/27/20 10:57 DC  





 


 Sevoflurane


  (Ultane)  60 ml  STK-MED ONCE  4/27/20 12:26


 4/27/20 12:27 DC  





 


 Sodium


 Bicarbonate 50


 meq/Sodium


 Chloride  1,050 ml @ 


 75 mls/hr  Q14H  3/18/20 07:30


 3/23/20 10:28 DC 3/22/20 21:10


75 MLS/HR


 


 Sodium Acetate 50


 meq/Potassium


 Acetate 55 meq/


 Magnesium Sulfate


 20 meq/Calcium


 Gluconate 10 meq/


 Multivitamins 10


 ml/Chromium/


 Copper/Manganese/


 Seleni/Zn 0.5 ml/


 Insulin Human


 Regular 35 unit/


 Total Parenteral


 Nutrition/Amino


 Acids/Dextrose/


 Fat Emulsion


 Intravenous  1,800 ml @ 


 75 mls/hr  TPN  CONT  4/25/20 22:00


 4/26/20 21:59 DC 4/25/20 22:03


75 MLS/HR


 


 Sodium Chloride  1,000 ml @ 


 25 mls/hr  Q24H  4/27/20 14:30


   UNV  





 


 Sodium Chloride


  (Normal Saline


 Flush)  3 ml  QSHIFT  PRN  4/27/20 13:45


     





 


 Sodium Chloride


 90 meq/Calcium


 Gluconate 10 meq/


 Multivitamins 10


 ml/Chromium/


 Copper/Manganese/


 Seleni/Zn 0.5 ml/


 Total Parenteral


 Nutrition/Amino


 Acids/Dextrose/


 Fat Emulsion


 Intravenous  1,512 ml @ 


 63 mls/hr  TPN  CONT  3/18/20 22:00


 3/19/20 21:59 DC 3/18/20 22:06


63 MLS/HR


 


 Sodium Chloride


 90 meq/Calcium


 Gluconate 10 meq/


 Multivitamins 10


 ml/Chromium/


 Copper/Manganese/


 Seleni/Zn 1 ml/


 Total Parenteral


 Nutrition/Amino


 Acids/Dextrose/


 Fat Emulsion


 Intravenous  55.005 ml


  @ 2.292


 mls/hr  TPN  CONT  3/18/20 22:00


 3/18/20 12:33 DC  





 


 Sodium Chloride


 90 meq/Magnesium


 Sulfate 10 meq/


 Calcium Gluconate


 20 meq/


 Multivitamins 10


 ml/Chromium/


 Copper/Manganese/


 Seleni/Zn 0.5 ml/


 Total Parenteral


 Nutrition/Amino


 Acids/Dextrose/


 Fat Emulsion


 Intravenous  1,512 ml @ 


 63 mls/hr  TPN  CONT  3/19/20 22:00


 3/20/20 21:59 DC 3/19/20 22:25


63 MLS/HR


 


 Sodium Chloride


 90 meq/Magnesium


 Sulfate 12 meq/


 Calcium Gluconate


 15 meq/


 Multivitamins 10


 ml/Chromium/


 Copper/Manganese/


 Seleni/Zn 0.5 ml/


 Insulin Human


 Regular 25 unit/


 Total Parenteral


 Nutrition/Amino


 Acids/Dextrose/


 Fat Emulsion


 Intravenous  1,400 ml @ 


 58.333 mls/


 hr  TPN  CONT  4/8/20 22:00


 4/9/20 21:59 DC 4/8/20 21:41


58.333 MLS/HR


 


 Sodium Chloride


 90 meq/Potassium


 Chloride 15 meq/


 Magnesium Sulfate


 12 meq/Calcium


 Gluconate 15 meq/


 Multivitamins 10


 ml/Chromium/


 Copper/Manganese/


 Seleni/Zn 0.5 ml/


 Insulin Human


 Regular 25 unit/


 Total Parenteral


 Nutrition/Amino


 Acids/Dextrose/


 Fat Emulsion


 Intravenous  1,400 ml @ 


 58.333 mls/


 hr  TPN  CONT  4/7/20 22:00


 4/8/20 21:59 DC 4/7/20 22:13


58.333 MLS/HR


 


 Sodium Chloride


 90 meq/Potassium


 Chloride 15 meq/


 Potassium


 Phosphate 10 mmol/


 Magnesium Sulfate


 8 meq/Calcium


 Gluconate 15 meq/


 Multivitamins 10


 ml/Chromium/


 Copper/Manganese/


 Seleni/Zn 0.5 ml/


 Insulin Human


 Regular 25 unit/


 Total Parenteral


 Nutrition/Amino


 Acids/Dextrose/


 Fat Emulsion


 Intravenous  1,400 ml @ 


 58.333 mls/


 hr  TPN  CONT  4/5/20 22:00


 4/6/20 21:59 DC 4/5/20 21:20


58.333 MLS/HR


 


 Sodium Chloride


 90 meq/Potassium


 Chloride 15 meq/


 Potassium


 Phosphate 10 mmol/


 Magnesium Sulfate


 10 meq/Calcium


 Gluconate 20 meq/


 Multivitamins 10


 ml/Chromium/


 Copper/Manganese/


 Seleni/Zn 0.5 ml/


 Total Parenteral


 Nutrition/Amino


 Acids/Dextrose/


 Fat Emulsion


 Intravenous  1,400 ml @ 


 58.333 mls/


 hr  TPN  CONT  3/23/20 22:00


 3/24/20 21:59 DC 3/23/20 21:42


58.333 MLS/HR


 


 Sodium Chloride


 90 meq/Potassium


 Chloride 15 meq/


 Potassium


 Phosphate 10 mmol/


 Magnesium Sulfate


 12 meq/Calcium


 Gluconate 15 meq/


 Multivitamins 10


 ml/Chromium/


 Copper/Manganese/


 Seleni/Zn 0.5 ml/


 Insulin Human


 Regular 25 unit/


 Total Parenteral


 Nutrition/Amino


 Acids/Dextrose/


 Fat Emulsion


 Intravenous  1,400 ml @ 


 58.333 mls/


 hr  TPN  CONT  4/6/20 22:00


 4/7/20 21:59 DC 4/6/20 22:24


58.333 MLS/HR


 


 Sodium Chloride


 90 meq/Potassium


 Chloride 15 meq/


 Potassium


 Phosphate 15 mmol/


 Magnesium Sulfate


 10 meq/Calcium


 Gluconate 15 meq/


 Multivitamins 10


 ml/Chromium/


 Copper/Manganese/


 Seleni/Zn 0.5 ml/


 Total Parenteral


 Nutrition/Amino


 Acids/Dextrose/


 Fat Emulsion


 Intravenous  1,400 ml @ 


 58.333 mls/


 hr  TPN  CONT  3/24/20 22:00


 3/25/20 21:59 DC 3/24/20 22:17


58.333 MLS/HR


 


 Sodium Chloride


 90 meq/Potassium


 Chloride 15 meq/


 Potassium


 Phosphate 15 mmol/


 Magnesium Sulfate


 10 meq/Calcium


 Gluconate 20 meq/


 Multivitamins 10


 ml/Chromium/


 Copper/Manganese/


 Seleni/Zn 0.5 ml/


 Total Parenteral


 Nutrition/Amino


 Acids/Dextrose/


 Fat Emulsion


 Intravenous  1,200 ml @ 


 50 mls/hr  TPN  CONT  3/22/20 22:00


 3/22/20 14:17 DC  





 


 Sodium Chloride


 90 meq/Potassium


 Chloride 15 meq/


 Potassium


 Phosphate 18 mmol/


 Magnesium Sulfate


 8 meq/Calcium


 Gluconate 15 meq/


 Multivitamins 10


 ml/Chromium/


 Copper/Manganese/


 Seleni/Zn 0.5 ml/


 Insulin Human


 Regular 10 unit/


 Total Parenteral


 Nutrition/Amino


 Acids/Dextrose/


 Fat Emulsion


 Intravenous  1,400 ml @ 


 58.333 mls/


 hr  TPN  CONT  3/27/20 22:00


 3/28/20 21:59 DC 3/27/20 21:43


58.333 MLS/HR


 


 Sodium Chloride


 90 meq/Potassium


 Chloride 15 meq/


 Potassium


 Phosphate 18 mmol/


 Magnesium Sulfate


 8 meq/Calcium


 Gluconate 15 meq/


 Multivitamins 10


 ml/Chromium/


 Copper/Manganese/


 Seleni/Zn 0.5 ml/


 Insulin Human


 Regular 15 unit/


 Total Parenteral


 Nutrition/Amino


 Acids/Dextrose/


 Fat Emulsion


 Intravenous  1,400 ml @ 


 58.333 mls/


 hr  TPN  CONT  3/30/20 22:00


 3/31/20 21:59 DC 3/30/20 21:47


58.333 MLS/HR


 


 Sodium Chloride


 90 meq/Potassium


 Chloride 15 meq/


 Potassium


 Phosphate 18 mmol/


 Magnesium Sulfate


 8 meq/Calcium


 Gluconate 15 meq/


 Multivitamins 10


 ml/Chromium/


 Copper/Manganese/


 Seleni/Zn 0.5 ml/


 Insulin Human


 Regular 20 unit/


 Total Parenteral


 Nutrition/Amino


 Acids/Dextrose/


 Fat Emulsion


 Intravenous  1,400 ml @ 


 58.333 mls/


 hr  TPN  CONT  4/2/20 22:00


 4/3/20 21:59 DC 4/2/20 22:45


58.333 MLS/HR


 


 Sodium Chloride


 90 meq/Potassium


 Chloride 15 meq/


 Potassium


 Phosphate 18 mmol/


 Magnesium Sulfate


 8 meq/Calcium


 Gluconate 15 meq/


 Multivitamins 10


 ml/Chromium/


 Copper/Manganese/


 Seleni/Zn 0.5 ml/


 Total Parenteral


 Nutrition/Amino


 Acids/Dextrose/


 Fat Emulsion


 Intravenous  1,400 ml @ 


 58.333 mls/


 hr  TPN  CONT  3/26/20 22:00


 3/27/20 21:59 DC 3/26/20 22:00


58.333 MLS/HR


 


 Sodium Chloride


 90 meq/Potassium


 Phosphate 15 mmol/


 Magnesium Sulfate


 12 meq/Calcium


 Gluconate 15 meq/


 Multivitamins 10


 ml/Chromium/


 Copper/Manganese/


 Seleni/Zn 0.5 ml/


 Insulin Human


 Regular 30 unit/


 Total Parenteral


 Nutrition/Amino


 Acids/Dextrose/


 Fat Emulsion


 Intravenous  1,400 ml @ 


 58.333 mls/


 hr  TPN  CONT  4/10/20 22:00


 4/11/20 21:59 DC 4/10/20 21:49


58.333 MLS/HR


 


 Sodium Chloride


 90 meq/Potassium


 Phosphate 15 mmol/


 Magnesium Sulfate


 12 meq/Calcium


 Gluconate 15 meq/


 Multivitamins 10


 ml/Chromium/


 Copper/Manganese/


 Seleni/Zn 0.5 ml/


 Insulin Human


 Regular 40 unit/


 Total Parenteral


 Nutrition/Amino


 Acids/Dextrose/


 Fat Emulsion


 Intravenous  1,400 ml @ 


 58.333 mls/


 hr  TPN  CONT  4/11/20 22:00


 4/12/20 21:59 DC 4/11/20 21:21


58.333 MLS/HR


 


 Sodium Chloride


 90 meq/Potassium


 Phosphate 19 mmol/


 Magnesium Sulfate


 12 meq/Calcium


 Gluconate 15 meq/


 Multivitamins 10


 ml/Chromium/


 Copper/Manganese/


 Seleni/Zn 0.5 ml/


 Insulin Human


 Regular 40 unit/


 Total Parenteral


 Nutrition/Amino


 Acids/Dextrose/


 Fat Emulsion


 Intravenous  1,400 ml @ 


 58.333 mls/


 hr  TPN  CONT  4/12/20 22:00


 4/13/20 21:59 DC 4/12/20 21:54


58.333 MLS/HR


 


 Sodium Chloride


 90 meq/Potassium


 Phosphate 5 mmol/


 Magnesium Sulfate


 12 meq/Calcium


 Gluconate 15 meq/


 Multivitamins 10


 ml/Chromium/


 Copper/Manganese/


 Seleni/Zn 0.5 ml/


 Insulin Human


 Regular 30 unit/


 Total Parenteral


 Nutrition/Amino


 Acids/Dextrose/


 Fat Emulsion


 Intravenous  1,400 ml @ 


 58.333 mls/


 hr  TPN  CONT  4/9/20 22:00


 4/10/20 21:59 DC 4/9/20 22:08


58.333 MLS/HR


 


 Sodium Chloride


 100 meq/Potassium


 Chloride 40 meq/


 Magnesium Sulfate


 15 meq/Calcium


 Gluconate 15 meq/


 Multivitamins 10


 ml/Chromium/


 Copper/Manganese/


 Seleni/Zn 0.5 ml/


 Insulin Human


 Regular 35 unit/


 Total Parenteral


 Nutrition/Amino


 Acids/Dextrose/


 Fat Emulsion


 Intravenous  1,400 ml @ 


 58.333 mls/


 hr  TPN  CONT  4/19/20 22:00


 4/20/20 21:59 DC 4/19/20 22:46


58.333 MLS/HR


 


 Sodium Chloride


 100 meq/Potassium


 Chloride 40 meq/


 Magnesium Sulfate


 20 meq/Calcium


 Gluconate 10 meq/


 Multivitamins 10


 ml/Chromium/


 Copper/Manganese/


 Seleni/Zn 0.5 ml/


 Insulin Human


 Regular 35 unit/


 Total Parenteral


 Nutrition/Amino


 Acids/Dextrose/


 Fat Emulsion


 Intravenous  1,400 ml @ 


 58.333 mls/


 hr  TPN  CONT  4/23/20 22:00


 4/24/20 21:59 DC 4/24/20 00:06


58.333 MLS/HR


 


 Sodium Chloride


 100 meq/Potassium


 Chloride 40 meq/


 Magnesium Sulfate


 20 meq/Calcium


 Gluconate 15 meq/


 Multivitamins 10


 ml/Chromium/


 Copper/Manganese/


 Seleni/Zn 0.5 ml/


 Insulin Human


 Regular 35 unit/


 Total Parenteral


 Nutrition/Amino


 Acids/Dextrose/


 Fat Emulsion


 Intravenous  1,400 ml @ 


 58.333 mls/


 hr  TPN  CONT  4/22/20 22:00


 4/23/20 21:59 DC 4/22/20 22:27


58.333 MLS/HR


 


 Sodium Chloride


 100 meq/Potassium


 Phosphate 10 mmol/


 Magnesium Sulfate


 12 meq/Calcium


 Gluconate 15 meq/


 Multivitamins 10


 ml/Chromium/


 Copper/Manganese/


 Seleni/Zn 0.5 ml/


 Insulin Human


 Regular 35 unit/


 Potassium


 Chloride 20 meq/


 Total Parenteral


 Nutrition/Amino


 Acids/Dextrose/


 Fat Emulsion


 Intravenous  1,400 ml @ 


 58.333 mls/


 hr  TPN  CONT  4/16/20 22:00


 4/17/20 21:59 DC 4/16/20 22:10


58.333 MLS/HR


 


 Sodium Chloride


 100 meq/Potassium


 Phosphate 19 mmol/


 Magnesium Sulfate


 12 meq/Calcium


 Gluconate 15 meq/


 Multivitamins 10


 ml/Chromium/


 Copper/Manganese/


 Seleni/Zn 0.5 ml/


 Insulin Human


 Regular 40 unit/


 Potassium


 Chloride 20 meq/


 Total Parenteral


 Nutrition/Amino


 Acids/Dextrose/


 Fat Emulsion


 Intravenous  1,400 ml @ 


 58.333 mls/


 hr  TPN  CONT  4/15/20 22:00


 4/16/20 21:59 DC 4/15/20 21:20


58.333 MLS/HR


 


 Sodium Chloride


 100 meq/Potassium


 Phosphate 5 mmol/


 Magnesium Sulfate


 12 meq/Calcium


 Gluconate 15 meq/


 Multivitamins 10


 ml/Chromium/


 Copper/Manganese/


 Seleni/Zn 0.5 ml/


 Insulin Human


 Regular 35 unit/


 Potassium


 Chloride 20 meq/


 Total Parenteral


 Nutrition/Amino


 Acids/Dextrose/


 Fat Emulsion


 Intravenous  1,400 ml @ 


 58.333 mls/


 hr  TPN  CONT  4/17/20 22:00


 4/18/20 21:59 DC 4/17/20 22:59


58.333 MLS/HR


 


 Succinylcholine


 Chloride


  (Anectine)  120 mg  1X  ONCE  3/23/20 08:30


 3/23/20 08:31 DC 3/23/20 08:34


120 MG








Lab





Laboratory Tests








Test


 5/6/20


13:00 5/6/20


16:40 5/6/20


17:30 5/7/20


00:09


 


Glucose (Fingerstick)


 156 mg/dL


(70-99) 


 108 mg/dL


(70-99) 100 mg/dL


(70-99)


 


Hemoglobin


 


 7.8 g/dL


(12.0-15.5) 


 





 


Hematocrit


 


 24.1 %


(36.0-47.0) 


 





 


Mean Corpuscular Hemoglobin


Concent 


 32 g/dL


(31-37) 


 





 


Test


 5/7/20


05:00 


 


 





 


White Blood Count


 9.1 x10^3/uL


(4.0-11.0) 


 


 





 


Red Blood Count


 2.44 x10^6/uL


(3.50-5.40) 


 


 





 


Hemoglobin


 7.1 g/dL


(12.0-15.5) 


 


 





 


Hematocrit


 21.9 %


(36.0-47.0) 


 


 





 


Mean Corpuscular Volume 90 fL ()    


 


Mean Corpuscular Hemoglobin 29 pg (25-35)    


 


Mean Corpuscular Hemoglobin


Concent 33 g/dL


(31-37) 


 


 





 


Red Cell Distribution Width


 17.5 %


(11.5-14.5) 


 


 





 


Platelet Count


 382 x10^3/uL


(140-400) 


 


 





 


Neutrophils (%) (Auto) 73 % (31-73)    


 


Lymphocytes (%) (Auto) 21 % (24-48)    


 


Monocytes (%) (Auto) 5 % (0-9)    


 


Eosinophils (%) (Auto) 1 % (0-3)    


 


Basophils (%) (Auto) 0 % (0-3)    


 


Neutrophils # (Auto)


 6.6 x10^3/uL


(1.8-7.7) 


 


 





 


Lymphocytes # (Auto)


 1.9 x10^3/uL


(1.0-4.8) 


 


 





 


Monocytes # (Auto)


 0.5 x10^3/uL


(0.0-1.1) 


 


 





 


Eosinophils # (Auto)


 0.1 x10^3/uL


(0.0-0.7) 


 


 





 


Basophils # (Auto)


 0.0 x10^3/uL


(0.0-0.2) 


 


 





 


Sodium Level


 152 mmol/L


(136-145) 


 


 





 


Potassium Level


 4.2 mmol/L


(3.5-5.1) 


 


 





 


Chloride Level


 116 mmol/L


() 


 


 





 


Carbon Dioxide Level


 31 mmol/L


(21-32) 


 


 





 


Anion Gap 5 (6-14)    


 


Blood Urea Nitrogen


 39 mg/dL


(7-20) 


 


 





 


Creatinine


 1.0 mg/dL


(0.6-1.0) 


 


 





 


Estimated GFR


(Cockcroft-Gault) 58.9 


 


 


 





 


Glucose Level


 153 mg/dL


(70-99) 


 


 





 


Glucose (Fingerstick)


 139 mg/dL


(70-99) 


 


 





 


Calcium Level


 8.4 mg/dL


(8.5-10.1) 


 


 





 


Phosphorus Level


 3.3 mg/dL


(2.6-4.7) 


 


 





 


Magnesium Level


 2.0 mg/dL


(1.8-2.4) 


 


 











Results


All relevant outside records, renal labs, imaging studies, telemetry/EKG's were 

reviewed.











KIMBERLY MYERS MD                 May 7, 2020 11:37

## 2020-05-07 NOTE — NUR
Patient vomited moderate amount of green bile at 0830 approx 100ml. Vomited moderate amount 
earlier this morning per night shift nurse. Dr Flores notified, order to place NG to LIS. NG 
placed per air bolus, and return of gastric content. Zofran given. Patient in no apparent 
distress, will monitor.

## 2020-05-07 NOTE — PDOC
PROGRESS NOTES


Chief Complaint


Chief Complaint


Acute hypoxic Respiratory failure requiring mechanical ventilation (on vent 

since 3/23)


Tracheostomy


bilateral pleural effusions/pulm edema 


Sepsis


Severe Acute gallstone pancreatitis (not a surgical candidate at this time) with

necrosis


Acute kidney failure now requiring dialysis


Salpingitis


Gallstones (Calculus of gallbladder with acute cholecystitis without 

obstruction)


HTN 


Leukocytosis 


Hypoxia


Uterine fibroid


Intractable pain


Intractable nausea


Covid 19 negative. 


Acute on chronic anemia 


EEG: No seizure activity


ESRD on HD


Hyperglycemia





Plan:


Continue with supportive measures


No surgical plans as of yet


We will continue to follow





History of Present Illness


History of Present Illness


Ms Tadeo is a 48yo F w/ PMHx HTN, prediabetes who presented to the emergency 

room with complaints of abdominal pain on 3/16/2020. Found with Lipase 38772, 

, , Bilirubin 1.4.


CT abdomen confirms pancreatic inflammation, peripancreatic fluid and 

inflammatory changes around the pancreas consistent with pancreatitis. 

Cholelithiasis and 1.4cm uterine fibroid as well as possible left salpingitis. 

Admitted for further care


GI, General surgery, ID, Pulm consulted.





3/17: PICC placed per IR. Renal US negative. Started on levophed. Repeat CT 

abdomen w/ necrosis; 3/18: Dialysis catheter per nephrology; 3/19: On BiPAP; 

3/20: BiPAP, dialysis; 3/21: Overnight Tmax 101.7 , still on BiPAP FiO2 40%, sti

ll on low dose Levophed gtt, TPN initiated. On dialysis


4/6: Tracheostomy; 4/12: S/p tracheostomy on vent spontaneous respirations with 

5 of pressure support 35% FiO2, rectal tube and a [place], off pressors; 

4/14:Still on vent via trach. Removed PICC and CVC LIJ and replaced. CT 

chest/abd/pelvis with bilateral pleural effusion and ascites.


4/15: Renal function stable. Still on vent. More interactive today. Miming wish 

for food. Plan discussed for thoracentesis/paracentesis with daughters today. 

They were under impression patient was doing worse due to a miscommunication 

which has been clarified over the phone. 4.3L removed.


4/17: Febrile overnight 101.8F. More interactive, still on vent. Asking for ice 

by miming; 4/18: Afebrile overnight. TMax last 24 hours 100.6F. Hb 7.1. 

Interactive when awake. 4/22: Transfusion 1u PRBC (6U total since admit)


4/23-4/26: TPN and precedex, vent.


4/27: Tmax 101F overnight. Hb 8.2. HD cath out since 4/24. Alert. On vent SIMV 

35% FiO2. Surgery: ex-lap, no naif or pancreatic necrosectomy 2/2 profound 

inflammation.


4/28: Seen POD #1. Afebrile overnight. BUN 62. CBC WNL today.Remains on vent via

trach, TPN. Able to point today and indicate she wishes her daughters and Rolf 

to be involved in her care.


4/29: Hb 7.4, Na 151. Remains on vent via trach, TPN. off HD for now. Not 

tolerating trach shield well.


4/30: Negative US for UE DVT. More relaxed today. Has some increase in UOP. 

Tolerating vent well. She is requesting to eat and drink via writing. T max 100F

24 hours ago, but 101F at 1200. Right PICC placed. Speaking valve for half the 

day in [facility]


5/1: Na 153 today. Good UOP. Tmax 101F at 1200 on 4/30. She becomes a bit 

anxious and did not sleep much last night, wishes to try to sleep this morning


5/2: Able to speak well with speaking valve today. Na 153, K2.9, Mg 1.8, Cr 1. 

100.4F axillary temp overnight. Asking for food.


5/3: Afebrile overnight. ABG with pH 7.27/75/123. Hb 7.1. Na 153. PCA settings 

decreased


5/4: No acute events reported overnight, case discussed with nursing staff 

patient in no acute distress no complaints during my visit


5/5: Patient responding to verbal stimuli.  She is saturating well and not 

requiring ventilation support, no acute events reported overnight seems to be 

slowly improving


5/6: Patient requiring transfusion of packed red blood cells due to anemia, no 

evidence of acute bleeding, appreciate GI consultant recommendations


5/7: Patient required ventilatory support given that she desaturated, she is 

lying in bed in mild distress, case discussed with nursing staff, no evidence of

bleeding, h an h noted. 





Plan:


vent support, wean if possible throughout the day 


Encourage activity as tolerated


Monitor fever curve, no obvious source of infection, possibly some aspiration 

events, atelectasis


will monitor for bleeding.





Vitals


Vitals





Vital Signs








  Date Time  Temp Pulse Resp B/P (MAP) Pulse Ox O2 Delivery O2 Flow Rate FiO2


 


5/7/20 14:00  94 34 134/72 (92) 98 Tracheal Collar 8.0 


 


5/7/20 12:00 99.8       





 99.8       











Physical Exam


Physical Exam


GENERAL: Propped up in bed. Nonresponsive s/p meds


HEENT:  oral cavity dry 


NECK:  Trach/vent 


LUNGS: rhonchi 


HEART:  S1, S2, regular 


ABDOMEN: Distended, hypoactive BS, drain placement (4/27 - serous fluid)


: [place] (4/14)


EXTREMITIES: Generalized edema, no cyanosis, SCDs bilaterally 


DERMATOLOGIC:  Warm and dry.  No generalized rash.  


CENTRAL NERVOUS SYSTEM: weak, mouthing words to simple questions 


PICC line in place April 30, 2020 clean


HDC has been removed


LIJ removed


General:  Alert, Oriented X3, Cooperative, No acute distress


Heart:  Regular rate, No murmurs


Lungs:  Crackles


Abdomen:  Soft, No tenderness, Other (NGT with bilious aspirate)


Extremities:  No edema


Skin:  Other (mottling noted to extremities )





Labs


LABS





Laboratory Tests








Test


 5/6/20


16:40 5/6/20


17:30 5/7/20


00:09 5/7/20


05:00


 


Hemoglobin


 7.8 g/dL


(12.0-15.5) 


 


 7.1 g/dL


(12.0-15.5)


 


Hematocrit


 24.1 %


(36.0-47.0) 


 


 21.9 %


(36.0-47.0)


 


Mean Corpuscular Hemoglobin


Concent 32 g/dL


(31-37) 


 


 33 g/dL


(31-37)


 


Glucose (Fingerstick)


 


 108 mg/dL


(70-99) 100 mg/dL


(70-99) 139 mg/dL


(70-99)


 


White Blood Count


 


 


 


 9.1 x10^3/uL


(4.0-11.0)


 


Red Blood Count


 


 


 


 2.44 x10^6/uL


(3.50-5.40)


 


Mean Corpuscular Volume    90 fL () 


 


Mean Corpuscular Hemoglobin    29 pg (25-35) 


 


Red Cell Distribution Width


 


 


 


 17.5 %


(11.5-14.5)


 


Platelet Count


 


 


 


 382 x10^3/uL


(140-400)


 


Neutrophils (%) (Auto)    73 % (31-73) 


 


Lymphocytes (%) (Auto)    21 % (24-48) 


 


Monocytes (%) (Auto)    5 % (0-9) 


 


Eosinophils (%) (Auto)    1 % (0-3) 


 


Basophils (%) (Auto)    0 % (0-3) 


 


Neutrophils # (Auto)


 


 


 


 6.6 x10^3/uL


(1.8-7.7)


 


Lymphocytes # (Auto)


 


 


 


 1.9 x10^3/uL


(1.0-4.8)


 


Monocytes # (Auto)


 


 


 


 0.5 x10^3/uL


(0.0-1.1)


 


Eosinophils # (Auto)


 


 


 


 0.1 x10^3/uL


(0.0-0.7)


 


Basophils # (Auto)


 


 


 


 0.0 x10^3/uL


(0.0-0.2)


 


Sodium Level


 


 


 


 152 mmol/L


(136-145)


 


Potassium Level


 


 


 


 4.2 mmol/L


(3.5-5.1)


 


Chloride Level


 


 


 


 116 mmol/L


()


 


Carbon Dioxide Level


 


 


 


 31 mmol/L


(21-32)


 


Anion Gap    5 (6-14) 


 


Blood Urea Nitrogen


 


 


 


 39 mg/dL


(7-20)


 


Creatinine


 


 


 


 1.0 mg/dL


(0.6-1.0)


 


Estimated GFR


(Cockcroft-Gault) 


 


 


 58.9 





 


Glucose Level


 


 


 


 153 mg/dL


(70-99)


 


Calcium Level


 


 


 


 8.4 mg/dL


(8.5-10.1)


 


Phosphorus Level


 


 


 


 3.3 mg/dL


(2.6-4.7)


 


Magnesium Level


 


 


 


 2.0 mg/dL


(1.8-2.4)


 


Test


 5/7/20


11:44 


 


 





 


Glucose (Fingerstick)


 150 mg/dL


(70-99) 


 


 














Assessment and Plan


Assessmemt and Plan


Problems


Medical Problems:


(1) Acute pancreatitis


Status: Acute  





(2) Cholelithiasis


Status: Acute  











Comment


Review of Relevant


I have reviewed the following items josy (where applicable) has been applied.


Labs





Laboratory Tests








Test


 5/5/20


18:13 5/6/20


00:09 5/6/20


06:05 5/6/20


06:09


 


Glucose (Fingerstick)


 143 mg/dL


(70-99) 151 mg/dL


(70-99) 


 128 mg/dL


(70-99)


 


White Blood Count


 


 


 8.8 x10^3/uL


(4.0-11.0) 





 


Red Blood Count


 


 


 2.30 x10^6/uL


(3.50-5.40) 





 


Hemoglobin


 


 


 6.8 g/dL


(12.0-15.5) 





 


Hematocrit


 


 


 20.9 %


(36.0-47.0) 





 


Mean Corpuscular Volume   91 fL ()  


 


Mean Corpuscular Hemoglobin   29 pg (25-35)  


 


Mean Corpuscular Hemoglobin


Concent 


 


 32 g/dL


(31-37) 





 


Red Cell Distribution Width


 


 


 18.8 %


(11.5-14.5) 





 


Platelet Count


 


 


 416 x10^3/uL


(140-400) 





 


Neutrophils (%) (Auto)   70 % (31-73)  


 


Lymphocytes (%) (Auto)   21 % (24-48)  


 


Monocytes (%) (Auto)   6 % (0-9)  


 


Eosinophils (%) (Auto)   3 % (0-3)  


 


Basophils (%) (Auto)   0 % (0-3)  


 


Neutrophils # (Auto)


 


 


 6.1 x10^3/uL


(1.8-7.7) 





 


Lymphocytes # (Auto)


 


 


 1.9 x10^3/uL


(1.0-4.8) 





 


Monocytes # (Auto)


 


 


 0.5 x10^3/uL


(0.0-1.1) 





 


Eosinophils # (Auto)


 


 


 0.2 x10^3/uL


(0.0-0.7) 





 


Basophils # (Auto)


 


 


 0.0 x10^3/uL


(0.0-0.2) 





 


Sodium Level


 


 


 152 mmol/L


(136-145) 





 


Potassium Level


 


 


 4.3 mmol/L


(3.5-5.1) 





 


Chloride Level


 


 


 113 mmol/L


() 





 


Carbon Dioxide Level


 


 


 31 mmol/L


(21-32) 





 


Anion Gap   8 (6-14)  


 


Blood Urea Nitrogen


 


 


 38 mg/dL


(7-20) 





 


Creatinine


 


 


 0.9 mg/dL


(0.6-1.0) 





 


Estimated GFR


(Cockcroft-Gault) 


 


 66.5 


 





 


BUN/Creatinine Ratio   42 (6-20)  


 


Glucose Level


 


 


 137 mg/dL


(70-99) 





 


Calcium Level


 


 


 9.0 mg/dL


(8.5-10.1) 





 


Total Bilirubin


 


 


 0.4 mg/dL


(0.2-1.0) 





 


Aspartate Amino Transf


(AST/SGOT) 


 


 35 U/L (15-37) 


 





 


Alanine Aminotransferase


(ALT/SGPT) 


 


 30 U/L (14-59) 


 





 


Alkaline Phosphatase


 


 


 84 U/L


() 





 


Total Protein


 


 


 5.6 g/dL


(6.4-8.2) 





 


Albumin


 


 


 1.8 g/dL


(3.4-5.0) 





 


Albumin/Globulin Ratio   0.5 (1.0-1.7)  


 


Test


 5/6/20


08:20 5/6/20


13:00 5/6/20


16:40 5/6/20


17:30


 


Hemoglobin


 6.4 g/dL


(12.0-15.5) 


 7.8 g/dL


(12.0-15.5) 





 


Hematocrit


 20.1 %


(36.0-47.0) 


 24.1 %


(36.0-47.0) 





 


Mean Corpuscular Hemoglobin


Concent 32 g/dL


(31-37) 


 32 g/dL


(31-37) 





 


Glucose (Fingerstick)


 


 156 mg/dL


(70-99) 


 108 mg/dL


(70-99)


 


Test


 5/7/20


00:09 5/7/20


05:00 5/7/20


11:44 





 


Glucose (Fingerstick)


 100 mg/dL


(70-99) 139 mg/dL


(70-99) 150 mg/dL


(70-99) 





 


White Blood Count


 


 9.1 x10^3/uL


(4.0-11.0) 


 





 


Red Blood Count


 


 2.44 x10^6/uL


(3.50-5.40) 


 





 


Hemoglobin


 


 7.1 g/dL


(12.0-15.5) 


 





 


Hematocrit


 


 21.9 %


(36.0-47.0) 


 





 


Mean Corpuscular Volume  90 fL ()   


 


Mean Corpuscular Hemoglobin  29 pg (25-35)   


 


Mean Corpuscular Hemoglobin


Concent 


 33 g/dL


(31-37) 


 





 


Red Cell Distribution Width


 


 17.5 %


(11.5-14.5) 


 





 


Platelet Count


 


 382 x10^3/uL


(140-400) 


 





 


Neutrophils (%) (Auto)  73 % (31-73)   


 


Lymphocytes (%) (Auto)  21 % (24-48)   


 


Monocytes (%) (Auto)  5 % (0-9)   


 


Eosinophils (%) (Auto)  1 % (0-3)   


 


Basophils (%) (Auto)  0 % (0-3)   


 


Neutrophils # (Auto)


 


 6.6 x10^3/uL


(1.8-7.7) 


 





 


Lymphocytes # (Auto)


 


 1.9 x10^3/uL


(1.0-4.8) 


 





 


Monocytes # (Auto)


 


 0.5 x10^3/uL


(0.0-1.1) 


 





 


Eosinophils # (Auto)


 


 0.1 x10^3/uL


(0.0-0.7) 


 





 


Basophils # (Auto)


 


 0.0 x10^3/uL


(0.0-0.2) 


 





 


Sodium Level


 


 152 mmol/L


(136-145) 


 





 


Potassium Level


 


 4.2 mmol/L


(3.5-5.1) 


 





 


Chloride Level


 


 116 mmol/L


() 


 





 


Carbon Dioxide Level


 


 31 mmol/L


(21-32) 


 





 


Anion Gap  5 (6-14)   


 


Blood Urea Nitrogen


 


 39 mg/dL


(7-20) 


 





 


Creatinine


 


 1.0 mg/dL


(0.6-1.0) 


 





 


Estimated GFR


(Cockcroft-Gault) 


 58.9 


 


 





 


Glucose Level


 


 153 mg/dL


(70-99) 


 





 


Calcium Level


 


 8.4 mg/dL


(8.5-10.1) 


 





 


Phosphorus Level


 


 3.3 mg/dL


(2.6-4.7) 


 





 


Magnesium Level


 


 2.0 mg/dL


(1.8-2.4) 


 











Laboratory Tests








Test


 5/6/20


16:40 5/6/20


17:30 5/7/20


00:09 5/7/20


05:00


 


Hemoglobin


 7.8 g/dL


(12.0-15.5) 


 


 7.1 g/dL


(12.0-15.5)


 


Hematocrit


 24.1 %


(36.0-47.0) 


 


 21.9 %


(36.0-47.0)


 


Mean Corpuscular Hemoglobin


Concent 32 g/dL


(31-37) 


 


 33 g/dL


(31-37)


 


Glucose (Fingerstick)


 


 108 mg/dL


(70-99) 100 mg/dL


(70-99) 139 mg/dL


(70-99)


 


White Blood Count


 


 


 


 9.1 x10^3/uL


(4.0-11.0)


 


Red Blood Count


 


 


 


 2.44 x10^6/uL


(3.50-5.40)


 


Mean Corpuscular Volume    90 fL () 


 


Mean Corpuscular Hemoglobin    29 pg (25-35) 


 


Red Cell Distribution Width


 


 


 


 17.5 %


(11.5-14.5)


 


Platelet Count


 


 


 


 382 x10^3/uL


(140-400)


 


Neutrophils (%) (Auto)    73 % (31-73) 


 


Lymphocytes (%) (Auto)    21 % (24-48) 


 


Monocytes (%) (Auto)    5 % (0-9) 


 


Eosinophils (%) (Auto)    1 % (0-3) 


 


Basophils (%) (Auto)    0 % (0-3) 


 


Neutrophils # (Auto)


 


 


 


 6.6 x10^3/uL


(1.8-7.7)


 


Lymphocytes # (Auto)


 


 


 


 1.9 x10^3/uL


(1.0-4.8)


 


Monocytes # (Auto)


 


 


 


 0.5 x10^3/uL


(0.0-1.1)


 


Eosinophils # (Auto)


 


 


 


 0.1 x10^3/uL


(0.0-0.7)


 


Basophils # (Auto)


 


 


 


 0.0 x10^3/uL


(0.0-0.2)


 


Sodium Level


 


 


 


 152 mmol/L


(136-145)


 


Potassium Level


 


 


 


 4.2 mmol/L


(3.5-5.1)


 


Chloride Level


 


 


 


 116 mmol/L


()


 


Carbon Dioxide Level


 


 


 


 31 mmol/L


(21-32)


 


Anion Gap    5 (6-14) 


 


Blood Urea Nitrogen


 


 


 


 39 mg/dL


(7-20)


 


Creatinine


 


 


 


 1.0 mg/dL


(0.6-1.0)


 


Estimated GFR


(Cockcroft-Gault) 


 


 


 58.9 





 


Glucose Level


 


 


 


 153 mg/dL


(70-99)


 


Calcium Level


 


 


 


 8.4 mg/dL


(8.5-10.1)


 


Phosphorus Level


 


 


 


 3.3 mg/dL


(2.6-4.7)


 


Magnesium Level


 


 


 


 2.0 mg/dL


(1.8-2.4)


 


Test


 5/7/20


11:44 


 


 





 


Glucose (Fingerstick)


 150 mg/dL


(70-99) 


 


 











Microbiology


5/4/20 Blood Culture - Preliminary, Resulted


         NO GROWTH AFTER 3 DAYS


4/30/20 Aerobic Culture - Final, Complete


          


4/30/20 Aerobic Culture Result 1 (ANSON) - Final, Complete


          


4/30/20 Gram Stain - Final, Complete


          


4/30/20 Gram Stain Result 1 (ANSON) - Final, Complete


          


4/30/20 Gram Stain Result 2 (ANSON) - Final, Complete


          


4/27/20 Aerobic and Anaerobic Culture - Final, Complete


          


4/27/20 Anaerobic Culture Result 1 (ANSON) - Final, Complete


          


4/27/20 Aerobic Culture - Final, Complete


          


4/27/20 Aerobic Culture Result 1 (ANSON) - Final, Complete


          


4/27/20 Gram Stain - Final, Complete


          


4/27/20 Gram Stain Result 1 (ANSON) - Final, Complete


          


4/27/20 Gram Stain Result 2 (ANSON) - Final, Complete


          


4/12/20 Urine Culture - Final, Complete


          


4/12/20 Urine Culture Result 1 (ANSON) - Final, Complete


Medications





Current Medications


Sodium Chloride 1,000 ml @  1,000 mls/hr Q1H IV  Last administered on 3/16/20at 

03:00;  Start 3/16/20 at 03:00;  Stop 3/16/20 at 03:59;  Status DC


Ondansetron HCl (Zofran) 4 mg 1X  ONCE IVP  Last administered on 3/16/20at 

03:27;  Start 3/16/20 at 03:00;  Stop 3/16/20 at 03:01;  Status DC


Morphine Sulfate (Morphine Sulfate) 4 mg 1X  ONCE IV ;  Start 3/16/20 at 03:00; 

Stop 3/16/20 at 03:01;  Status Cancel


Ketorolac Tromethamine (Toradol 30mg Vial) 30 mg 1X  ONCE IV  Last administered 

on 3/16/20at 02:54;  Start 3/16/20 at 03:00;  Stop 3/16/20 at 03:01;  Status DC


Fentanyl Citrate (Fentanyl 2ml Vial) 25 mcg 1X  ONCE IVP  Last administered on 

3/16/20at 03:23;  Start 3/16/20 at 03:30;  Stop 3/16/20 at 03:31;  Status DC


Fentanyl Citrate (Fentanyl 2ml Vial) 100 mcg STK-MED ONCE .ROUTE ;  Start 

3/16/20 at 03:18;  Stop 3/16/20 at 03:18;  Status DC


Iohexol (Omnipaque 350 Mg/ml) 90 ml 1X  ONCE IV  Last administered on 3/16/20at 

03:25;  Start 3/16/20 at 03:30;  Stop 3/16/20 at 03:31;  Status DC


Info (CONTRAST GIVEN -- Rx MONITORING) 1 each PRN DAILY  PRN MC SEE COMMENTS;  

Start 3/16/20 at 03:30;  Stop 3/18/20 at 03:29;  Status DC


Hydromorphone HCl (Dilaudid) 0.5 mg 1X  ONCE IV  Last administered on 3/16/20at 

03:55;  Start 3/16/20 at 04:30;  Stop 3/16/20 at 04:32;  Status DC


Ondansetron HCl (Zofran) 4 mg PRN Q8HRS  PRN IV NAUSEA/VOMITING 1ST CHOICE;  

Start 3/16/20 at 05:00;  Stop 3/16/20 at 09:27;  Status DC


Morphine Sulfate (Morphine Sulfate) 2 mg PRN Q2HR  PRN IV SEVERE PAIN 7-10 Last 

administered on 3/17/20at 12:26;  Start 3/16/20 at 05:00;  Stop 3/17/20 at 

14:15;  Status DC


Sodium Chloride 1,000 ml @  125 mls/hr Q8H IV  Last administered on 3/16/20at 

20:56;  Start 3/16/20 at 05:00;  Stop 3/17/20 at 04:59;  Status DC


Hydromorphone HCl (Dilaudid) 0.5 mg PRN Q3HRS  PRN IV SEVERE PAIN 7-10 Last 

administered on 3/17/20at 10:06;  Start 3/16/20 at 05:00;  Stop 3/17/20 at 

12:01;  Status DC


Piperacillin Sod/ Tazobactam Sod 4.5 gm/Sodium Chloride 100 ml @  200 mls/hr 1X 

ONCE IV  Last administered on 3/16/20at 05:44;  Start 3/16/20 at 06:00;  Stop 

3/16/20 at 06:29;  Status DC


Ondansetron HCl (Zofran) 4 mg PRN Q4HRS  PRN IV NAUSEA/VOMITING 1ST CHOICE Last 

administered on 5/7/20at 08:07;  Start 3/16/20 at 09:30


Insulin Human Lispro (HumaLOG) 0-9 UNITS Q6HRS SQ  Last administered on 5/6/20at

13:12;  Start 3/16/20 at 09:30


Dextrose (Dextrose 50%-Water Syringe) 12.5 gm PRN Q15MIN  PRN IV SEE COMMENTS;  

Start 3/16/20 at 09:30


Pantoprazole Sodium (PROTONIX VIAL for IV PUSH) 40 mg DAILYAC IVP  Last 

administered on 5/7/20at 07:39;  Start 3/16/20 at 11:30


Prochlorperazine Edisylate (Compazine) 10 mg PRN Q6HRS  PRN IV NAUSEA/VOMITING, 

2nd CHOICE Last administered on 5/7/20at 03:57;  Start 3/16/20 at 17:45


Atenolol (Tenormin) 100 mg DAILY PO ;  Start 3/17/20 at 09:00;  Stop 3/16/20 at 

20:08;  Status DC


Metoprolol Tartrate (Lopressor Vial) 2.5 mg Q6HRS IVP  Last administered on 

3/17/20at 05:51;  Start 3/16/20 at 20:15;  Stop 3/17/20 at 10:02;  Status DC


Metoprolol Tartrate (Lopressor Vial) 5 mg Q6HRS IVP  Last administered on 

3/26/20at 00:12;  Start 3/17/20 at 10:15;  Stop 3/28/20 at 08:48;  Status DC


Hydromorphone HCl (Dilaudid) 1 mg PRN Q3HRS  PRN IV SEVERE PAIN 7-10 Last 

administered on 3/23/20at 05:13;  Start 3/17/20 at 12:00;  Stop 3/31/20 at 

00:25;  Status DC


Lidocaine HCl (Buffered Lidocaine 1%) 3 ml STK-MED ONCE .ROUTE ;  Start 3/17/20 

at 12:55;  Stop 3/17/20 at 12:56;  Status DC


Albumin Human 500 ml @  125 mls/hr 1X  ONCE IV  Last administered on 3/17/20at 

14:33;  Start 3/17/20 at 14:30;  Stop 3/17/20 at 18:32;  Status DC


Norepinephrine Bitartrate 8 mg/ Dextrose 258 ml @  17.299 mls/ hr CONT  PRN IV 

PER PROTOCOL Last administered on 4/14/20at 12:48;  Start 3/17/20 at 15:30;  

Stop 4/17/20 at 09:19;  Status DC


Sodium Chloride 1,000 ml @  125 mls/hr Q8H IV  Last administered on 3/17/20at 

21:04;  Start 3/17/20 at 16:00;  Stop 3/18/20 at 02:42;  Status DC


Albumin Human 500 ml @  125 mls/hr PRN BID  PRN IV After every 2L NSS & BP < 

90mm Last administered on 4/1/20at 14:21;  Start 3/17/20 at 16:00


Iohexol (Omnipaque 300 Mg/ml) 60 ml 1X  ONCE IV  Last administered on 3/17/20at 

17:20;  Start 3/17/20 at 17:00;  Stop 3/17/20 at 17:01;  Status DC


Info (CONTRAST GIVEN -- Rx MONITORING) 1 each PRN DAILY  PRN MC SEE COMMENTS;  

Start 3/17/20 at 17:00;  Stop 3/19/20 at 16:59;  Status DC


Meropenem 1 gm/ Sodium Chloride 100 ml @  200 mls/hr Q8HRS IV  Last administered

on 3/18/20at 05:45;  Start 3/17/20 at 20:00;  Stop 3/18/20 at 08:48;  Status DC


Furosemide (Lasix) 40 mg 1X  ONCE IVP  Last administered on 3/17/20at 22:12;  

Start 3/17/20 at 22:30;  Stop 3/17/20 at 22:31;  Status DC


Calcium Chloride 1000 mg/Sodium Chloride 110 ml @  220 mls/hr 1X  ONCE IV  Last 

administered on 3/17/20at 22:11;  Start 3/17/20 at 22:30;  Stop 3/17/20 at 

22:59;  Status DC


Albuterol Sulfate (Ventolin Neb Soln) 2.5 mg 1X  ONCE NEB  Last administered on 

3/18/20at 00:56;  Start 3/17/20 at 22:30;  Stop 3/17/20 at 22:31;  Status DC


Insulin Human Regular (HumuLIN R VIAL) 5 unit 1X  ONCE IV  Last administered on 

3/17/20at 22:14;  Start 3/17/20 at 22:30;  Stop 3/17/20 at 22:31;  Status DC


Magnesium Sulfate 50 ml @ 25 mls/hr 1X  ONCE IV  Last administered on 3/18/20at 

02:57;  Start 3/18/20 at 03:00;  Stop 3/18/20 at 04:59;  Status DC


Calcium Gluconate 1000 mg/Sodium Chloride 110 ml @  220 mls/hr 1X  ONCE IV  Last

administered on 3/18/20at 02:46;  Start 3/18/20 at 03:00;  Stop 3/18/20 at 

03:29;  Status DC


Sodium Chloride 1,000 ml @  200 mls/hr Q5H IV  Last administered on 3/18/20at 

02:46;  Start 3/18/20 at 03:00;  Stop 3/18/20 at 10:21;  Status DC


Calcium Gluconate 1000 mg/Sodium Chloride 110 ml @  220 mls/hr 1X  ONCE IV  Last

administered on 3/18/20at 03:21;  Start 3/18/20 at 03:30;  Stop 3/18/20 at 

03:59;  Status DC


Sodium Bicarbonate 50 meq/Sodium Chloride 1,050 ml @  75 mls/hr Q14H IV  Last 

administered on 3/22/20at 21:10;  Start 3/18/20 at 07:30;  Stop 3/23/20 at 

10:28;  Status DC


Calcium Gluconate 2000 mg/Sodium Chloride 120 ml @  220 mls/hr 1X  ONCE IV  Last

administered on 3/18/20at 09:05;  Start 3/18/20 at 07:30;  Stop 3/18/20 at 08:

02;  Status DC


Lidocaine HCl (Xylocaine-Mpf 1% 2ml Vial) 2 ml STK-MED ONCE .ROUTE ;  Start 

3/18/20 at 08:47;  Stop 3/18/20 at 08:47;  Status DC


Meropenem 500 mg/ Sodium Chloride 50 ml @  100 mls/hr Q12HR IV  Last 

administered on 3/23/20at 21:01;  Start 3/18/20 at 18:00;  Stop 3/24/20 at 

07:58;  Status DC


Lidocaine HCl (Buffered Lidocaine 1%) 3 ml STK-MED ONCE .ROUTE ;  Start 3/18/20 

at 09:46;  Stop 3/18/20 at 09:46;  Status DC


Lidocaine HCl (Buffered Lidocaine 1%) 6 ml 1X  ONCE INJ  Last administered on 

3/18/20at 10:26;  Start 3/18/20 at 10:15;  Stop 3/18/20 at 10:16;  Status DC


Info (Tpn Per Pharmacy) 1 each PRN DAILY  PRN MC SEE COMMENTS Last administered 

on 5/7/20at 13:25;  Start 3/18/20 at 12:00


Sodium Chloride 1,000 ml @  1,000 mls/hr Q1H PRN IV hypotension;  Start 3/18/20 

at 12:07;  Stop 3/18/20 at 18:06;  Status DC


Diphenhydramine HCl (Benadryl) 25 mg 1X PRN  PRN IV ITCHING;  Start 3/18/20 at 

12:15;  Stop 3/19/20 at 12:14;  Status DC


Diphenhydramine HCl (Benadryl) 25 mg 1X PRN  PRN IV ITCHING;  Start 3/18/20 at 

12:15;  Stop 3/19/20 at 12:14;  Status DC


Sodium Chloride 1,000 ml @  400 mls/hr Q2H30M PRN IV PATENCY;  Start 3/18/20 at 

12:07;  Stop 3/19/20 at 00:06;  Status DC


Info (PHARMACY MONITORING -- do not chart) 1 each PRN DAILY  PRN MC SEE 

COMMENTS;  Start 3/18/20 at 12:15;  Stop 3/20/20 at 08:13;  Status DC


Sodium Chloride 90 meq/Calcium Gluconate 10 meq/ Multivitamins 10 ml/Chromium/ 

Copper/Manganese/ Seleni/Zn 1 ml/ Total Parenteral Nutrition/Amino 

Acids/Dextrose/ Fat Emulsion Intravenous 55.005 ml  @ 2.292 mls/hr TPN  CONT IV 

;  Start 3/18/20 at 22:00;  Stop 3/18/20 at 12:33;  Status DC


Info (Tpn Per Pharmacy) 1 each PRN DAILY  PRN MC SEE COMMENTS;  Start 3/18/20 at

12:30;  Status UNV


Sodium Chloride 90 meq/Calcium Gluconate 10 meq/ Multivitamins 10 ml/Chromium/ 

Copper/Manganese/ Seleni/Zn 0.5 ml/ Total Parenteral Nutrition/Amino 

Acids/Dextrose/ Fat Emulsion Intravenous 1,512 ml @  63 mls/hr TPN  CONT IV  

Last administered on 3/18/20at 22:06;  Start 3/18/20 at 22:00;  Stop 3/19/20 at 

21:59;  Status DC


Calcium Carbonate/ Glycine (Tums) 500 mg PRN AFTMEALHC  PRN PO INDIGESTION;  

Start 3/18/20 at 17:45


Calcium Gluconate (Calcium Gluconate) 2,000 mg 1X  ONCE IVP  Last administered 

on 3/19/20at 02:19;  Start 3/19/20 at 02:15;  Stop 3/19/20 at 02:16;  Status DC


Calcium Chloride 3000 mg/Sodium Chloride 1,030 ml @  50 mls/hr [id number] IV  Last 

administered on 3/21/20at 02:17;  Start 3/19/20 at 08:00;  Stop 3/21/20 at 

15:23;  Status DC


Lorazepam (Ativan Inj) 1 mg PRN Q4HRS  PRN IVP ANXIETY / AGITATION, 2nd choic 

Last administered on 4/17/20at 03:51;  Start 3/19/20 at 09:00;  Stop 4/17/20 at 

09:19;  Status DC


Sodium Chloride 1,000 ml @  1,000 mls/hr Q1H PRN IV hypotension;  Start 3/19/20 

at 08:56;  Stop 3/19/20 at 14:55;  Status DC


Albumin Human 200 ml @  200 mls/hr 1X PRN  PRN IV Hypotension;  Start 3/19/20 at

09:00;  Stop 3/19/20 at 14:59;  Status DC


Diphenhydramine HCl (Benadryl) 25 mg 1X PRN  PRN IV ITCHING;  Start 3/19/20 at 

09:00;  Stop 3/20/20 at 08:59;  Status DC


Diphenhydramine HCl (Benadryl) 25 mg 1X PRN  PRN IV ITCHING;  Start 3/19/20 at 

09:00;  Stop 3/20/20 at 08:59;  Status DC


Sodium Chloride 1,000 ml @  400 mls/hr Q2H30M PRN IV PATENCY;  Start 3/19/20 at 

08:56;  Stop 3/19/20 at 20:55;  Status DC


Info (PHARMACY MONITORING -- do not chart) 1 each PRN DAILY  PRN MC SEE 

COMMENTS;  Start 3/19/20 at 09:00;  Status UNV


Info (PHARMACY MONITORING -- do not chart) 1 each PRN DAILY  PRN MC SEE 

COMMENTS;  Start 3/19/20 at 09:00;  Stop 3/20/20 at 08:13;  Status DC


Digoxin (Lanoxin) 500 mcg 1X  ONCE IV  Last administered on 3/19/20at 10:04;  

Start 3/19/20 at 10:00;  Stop 3/19/20 at 10:01;  Status DC


Digoxin (Lanoxin) 125 mcg 1X  ONCE IV  Last administered on 3/19/20at 17:10;  

Start 3/19/20 at 18:00;  Stop 3/19/20 at 18:01;  Status DC


Magnesium Sulfate 100 ml @  25 mls/hr 1X  ONCE IV  Last administered on 

3/19/20at 12:48;  Start 3/19/20 at 13:00;  Stop 3/19/20 at 16:59;  Status DC


Sodium Chloride 90 meq/Magnesium Sulfate 10 meq/ Calcium Gluconate 20 meq/ 

Multivitamins 10 ml/Chromium/ Copper/Manganese/ Seleni/Zn 0.5 ml/ Total 

Parenteral Nutrition/Amino Acids/Dextrose/ Fat Emulsion Intravenous 1,512 ml @  

63 mls/hr TPN  CONT IV  Last administered on 3/19/20at 22:25;  Start 3/19/20 at 

22:00;  Stop 3/20/20 at 21:59;  Status DC


Sodium Chloride 1,000 ml @  1,000 mls/hr Q1H PRN IV hypotension;  Start 3/20/20 

at 08:05;  Stop 3/20/20 at 14:04;  Status DC


Albumin Human 200 ml @  200 mls/hr 1X  ONCE IV  Last administered on 3/20/20at 

08:57;  Start 3/20/20 at 08:15;  Stop 3/20/20 at 09:14;  Status DC


Diphenhydramine HCl (Benadryl) 25 mg 1X PRN  PRN IV ITCHING;  Start 3/20/20 at 

08:15;  Stop 3/21/20 at 08:14;  Status DC


Diphenhydramine HCl (Benadryl) 25 mg 1X PRN  PRN IV ITCHING;  Start 3/20/20 at 

08:15;  Stop 3/21/20 at 08:14;  Status DC


Sodium Chloride 1,000 ml @  400 mls/hr Q2H30M PRN IV PATENCY;  Start 3/20/20 at 

08:05;  Stop 3/20/20 at 20:04;  Status DC


Info (PHARMACY MONITORING -- do not chart) 1 each PRN DAILY  PRN MC SEE 

COMMENTS;  Start 3/20/20 at 08:15;  Stop 3/24/20 at 07:57;  Status DC


Sodium Chloride 90 meq/Potassium Chloride 15 meq/ Potassium Phosphate 10 mmol/ 

Magnesium Sulfate 10 meq/Calcium Gluconate 20 meq/ Multivitamins 10 ml/Chromium/

Copper/Manganese/ Seleni/Zn 0.5 ml/ Total Parenteral Nutrition/Amino 

Acids/Dextrose/ Fat Emulsion Intravenous 1,512 ml @  63 mls/hr TPN  CONT IV  

Last administered on 3/20/20at 21:01;  Start 3/20/20 at 22:00;  Stop 3/21/20 at 

21:59;  Status DC


Potassium Chloride/Water 100 ml @  100 mls/hr 1X  ONCE IV  Last administered on 

3/20/20at 14:09;  Start 3/20/20 at 14:00;  Stop 3/20/20 at 14:59;  Status DC


Benzocaine (Hurricaine One) 1 spray 1X  ONCE MM  Last administered on 3/20/20at 

16:38;  Start 3/20/20 at 14:30;  Stop 3/20/20 at 14:31;  Status DC


Lidocaine HCl (Glydo (Lidocaine) Jelly) 1 thomas 1X  ONCE MM  Last administered on 

3/20/20at 16:38;  Start 3/20/20 at 14:30;  Stop 3/20/20 at 14:31;  Status DC


Linezolid/Dextrose 300 ml @  300 mls/hr Q12HR IV  Last administered on 3/26/20at

21:04;  Start 3/20/20 at 20:00;  Stop 3/27/20 at 07:50;  Status DC


Acetaminophen (Tylenol) 650 mg PRN Q6HRS  PRN PO MILD PAIN / TEMP;  Start 

3/21/20 at 03:30;  Stop 3/21/20 at 03:36;  Status DC


Acetaminophen (Tylenol) 650 mg PRN Q6HRS  PRN PEG MILD PAIN / TEMP Last 

administered on 4/16/20at 19:56;  Start 3/21/20 at 03:36


Sodium Chloride 1,000 ml @  1,000 mls/hr Q1H PRN IV hypotension;  Start 3/21/20 

at 07:50;  Stop 3/21/20 at 13:49;  Status DC


Albumin Human 200 ml @  200 mls/hr 1X PRN  PRN IV Hypotension;  Start 3/21/20 at

08:00;  Stop 3/21/20 at 13:59;  Status DC


Sodium Chloride (Normal Saline Flush) 10 ml 1X PRN  PRN IV AP catheter pack;  

Start 3/21/20 at 08:00;  Stop 3/22/20 at 07:59;  Status DC


Sodium Chloride (Normal Saline Flush) 10 ml 1X PRN  PRN IV  catheter pack;  

Start 3/21/20 at 08:00;  Stop 3/22/20 at 07:59;  Status DC


Sodium Chloride 1,000 ml @  400 mls/hr Q2H30M PRN IV PATENCY;  Start 3/21/20 at 

07:50;  Stop 3/21/20 at 19:49;  Status DC


Info (PHARMACY MONITORING -- do not chart) 1 each PRN DAILY  PRN MC SEE 

COMMENTS;  Start 3/21/20 at 08:00;  Status UNV


Info (PHARMACY MONITORING -- do not chart) 1 each PRN DAILY  PRN MC SEE 

COMMENTS;  Start 3/21/20 at 08:00;  Stop 3/23/20 at 08:25;  Status DC


Sodium Chloride 90 meq/Potassium Chloride 15 meq/ Potassium Phosphate 10 mmol/ 

Magnesium Sulfate 10 meq/Calcium Gluconate 20 meq/ Multivitamins 10 ml/Chromium/

Copper/Manganese/ Seleni/Zn 0.5 ml/ Total Parenteral Nutrition/Amino 

Acids/Dextrose/ Fat Emulsion Intravenous 1,512 ml @  63 mls/hr TPN  CONT IV  

Last administered on 3/21/20at 20:57;  Start 3/21/20 at 22:00;  Stop 3/22/20 at 

21:59;  Status DC


Sodium Chloride 90 meq/Potassium Chloride 15 meq/ Potassium Phosphate 15 mmol/ 

Magnesium Sulfate 10 meq/Calcium Gluconate 20 meq/ Multivitamins 10 ml/Chromium/

Copper/Manganese/ Seleni/Zn 0.5 ml/ Total Parenteral Nutrition/Amino 

Acids/Dextrose/ Fat Emulsion Intravenous 1,512 ml @  63 mls/hr TPN  CONT IV ;  

Start 3/22/20 at 22:00;  Stop 3/22/20 at 14:16;  Status DC


Sodium Chloride 90 meq/Potassium Chloride 15 meq/ Potassium Phosphate 15 mmol/ 

Magnesium Sulfate 10 meq/Calcium Gluconate 20 meq/ Multivitamins 10 ml/Chromium/

Copper/Manganese/ Seleni/Zn 0.5 ml/ Total Parenteral Nutrition/Amino 

Acids/Dextrose/ Fat Emulsion Intravenous 1,200 ml @  50 mls/hr TPN  CONT IV ;  

Start 3/22/20 at 22:00;  Stop 3/22/20 at 14:17;  Status DC


Sodium Chloride 90 meq/Potassium Chloride 15 meq/ Potassium Phosphate 10 mmol/ 

Magnesium Sulfate 10 meq/Calcium Gluconate 20 meq/ Multivitamins 10 ml/Chromium/

Copper/Manganese/ Seleni/Zn 0.5 ml/ Total Parenteral Nutrition/Amino 

Acids/Dextrose/ Fat Emulsion Intravenous 1,200 ml @  50 mls/hr TPN  CONT IV  

Last administered on 3/22/20at 23:29;  Start 3/22/20 at 22:00;  Stop 3/23/20 at 

21:59;  Status DC


Sodium Chloride 1,000 ml @  1,000 mls/hr Q1H PRN IV hypotension;  Start 3/23/20 

at 07:28;  Stop 3/23/20 at 13:27;  Status DC


Albumin Human 200 ml @  200 mls/hr 1X  ONCE IV  Last administered on 3/23/20at 

08:51;  Start 3/23/20 at 07:30;  Stop 3/23/20 at 08:29;  Status DC


Diphenhydramine HCl (Benadryl) 25 mg 1X PRN  PRN IV ITCHING;  Start 3/23/20 at 

07:30;  Stop 3/24/20 at 07:29;  Status DC


Diphenhydramine HCl (Benadryl) 25 mg 1X PRN  PRN IV ITCHING;  Start 3/23/20 at 

07:30;  Stop 3/24/20 at 07:29;  Status DC


Sodium Chloride 1,000 ml @  400 mls/hr Q2H30M PRN IV PATENCY;  Start 3/23/20 at 

07:28;  Stop 3/23/20 at 19:27;  Status DC


Info (PHARMACY MONITORING -- do not chart) 1 each PRN DAILY  PRN MC SEE 

COMMENTS;  Start 3/23/20 at 07:30;  Stop 4/3/20 at 13:01;  Status DC


Metronidazole 100 ml @  100 mls/hr Q6HRS IV  Last administered on 4/8/20at 

06:26;  Start 3/23/20 at 08:30;  Stop 4/8/20 at 09:58;  Status DC


Micafungin Sodium 100 mg/Dextrose 100 ml @  100 mls/hr Q24H IV  Last 

administered on 4/30/20at 08:18;  Start 3/23/20 at 09:00;  Stop 4/30/20 at 

20:58;  Status DC


Propofol 0 ml @ As Directed STK-MED ONCE IV ;  Start 3/23/20 at 07:53;  Stop 

3/23/20 at 07:53;  Status DC


Etomidate (Amidate) 20 mg STK-MED ONCE IV ;  Start 3/23/20 at 07:53;  Stop 

3/23/20 at 07:54;  Status DC


Midazolam HCl (Versed) 5 mg STK-MED ONCE .ROUTE ;  Start 3/23/20 at 07:57;  Stop

3/23/20 at 07:57;  Status DC


Fentanyl Citrate 30 ml @ 0 mls/hr CONT  PRN IV SEE PROTOCOL Last administered on

4/17/20at 06:12;  Start 3/23/20 at 08:15;  Stop 4/17/20 at 09:19;  Status DC


Artificial Tears (Artificial Tears) 1 drop PRN Q1HR  PRN OU DRY EYE, 1st choice;

 Start 3/23/20 at 08:15;  Stop 4/29/20 at 05:31;  Status DC


Midazolam HCl 50 mg/Sodium Chloride 50 ml @ 0 mls/hr CONT  PRN IV SEE PROTOCOL 

Last administered on 3/26/20at 22:39;  Start 3/23/20 at 08:15;  Stop 3/28/20 at 

15:59;  Status DC


Etomidate (Amidate) 8 mg 1X  ONCE IV  Last administered on 3/23/20at 08:33;  Sta

rt 3/23/20 at 08:30;  Stop 3/23/20 at 08:31;  Status DC


Succinylcholine Chloride (Anectine) 120 mg 1X  ONCE IV  Last administered on 

3/23/20at 08:34;  Start 3/23/20 at 08:30;  Stop 3/23/20 at 08:31;  Status DC


Midazolam HCl (Versed) 5 mg 1X  ONCE IV ;  Start 3/23/20 at 08:30;  Stop 3/23/20

at 08:31;  Status DC


Potassium Chloride 15 meq/ Bicarbonate Dialysis Soln w/ out KCl 5,007.5 ml  @ 

1,000 mls/ hr Q5H1M IV  Last administered on 3/24/20at 11:11;  Start 3/23/20 at 

12:00;  Stop 3/24/20 at 11:15;  Status DC


Potassium Chloride 15 meq/ Bicarbonate Dialysis Soln w/ out KCl 5,007.5 ml  @ 

1,000 mls/ hr Q5H1M IV  Last administered on 3/24/20at 11:12;  Start 3/23/20 at 

12:00;  Stop 3/24/20 at 11:17;  Status DC


Potassium Chloride 15 meq/ Bicarbonate Dialysis Soln w/ out KCl 5,007.5 ml  @ 

1,000 mls/ hr Q5H1M IV  Last administered on 3/24/20at 11:11;  Start 3/23/20 at 

12:00;  Stop 3/24/20 at 11:19;  Status DC


Sodium Chloride 90 meq/Potassium Chloride 15 meq/ Potassium Phosphate 10 mmol/ 

Magnesium Sulfate 10 meq/Calcium Gluconate 20 meq/ Multivitamins 10 ml/Chromium/

Copper/Manganese/ Seleni/Zn 0.5 ml/ Total Parenteral Nutrition/Amino Acids/Dextr

ose/ Fat Emulsion Intravenous 1,400 ml @  58.333 mls/ hr TPN  CONT IV  Last 

administered on 3/23/20at 21:42;  Start 3/23/20 at 22:00;  Stop 3/24/20 at 

21:59;  Status DC


Heparin Sodium (Porcine) (Heparin Sodium) 5,000 unit Q8HRS SQ  Last administered

on 3/28/20at 05:55;  Start 3/23/20 at 15:00;  Stop 3/28/20 at 13:28;  Status DC


Meropenem 500 mg/ Sodium Chloride 50 ml @  100 mls/hr Q6HRS IV  Last 

administered on 3/25/20at 06:00;  Start 3/24/20 at 09:00;  Stop 3/25/20 at 

07:29;  Status DC


Potassium Phosphate 20 mmol/ Sodium Chloride 106.6667 ml @  51.667 m... 1X  ONCE

IV  Last administered on 3/24/20at 11:22;  Start 3/24/20 at 10:15;  Stop 3/24/20

at 12:18;  Status DC


Acetaminophen (Tylenol Supp) 650 mg PRN Q6HRS  PRN MI MILD PAIN / TEMP > 100.3'F

Last administered on 5/5/20at 09:12;  Start 3/24/20 at 10:30


Potassium Chloride/Water 100 ml @  100 mls/hr Q1H IV  Last administered on 3/24

/20at 12:12;  Start 3/24/20 at 11:00;  Stop 3/24/20 at 12:59;  Status DC


Potassium Chloride 20 meq/ Bicarbonate Dialysis Soln w/ out KCl 5,010 ml @  

1,000 mls/hr Q5H1M IV  Last administered on 3/25/20at 08:48;  Start 3/24/20 at 

12:00;  Stop 3/25/20 at 13:03;  Status DC


Potassium Chloride 20 meq/ Bicarbonate Dialysis Soln w/ out KCl 5,010 ml @  

1,000 mls/hr Q5H1M IV  Last administered on 3/29/20at 14:52;  Start 3/24/20 at 

11:30;  Stop 3/29/20 at 19:59;  Status DC


Potassium Chloride 20 meq/ Bicarbonate Dialysis Soln w/ out KCl 5,010 ml @  

1,000 mls/hr Q5H1M IV  Last administered on 3/29/20at 14:53;  Start 3/24/20 at 

11:30;  Stop 3/29/20 at 19:59;  Status DC


Sodium Chloride 90 meq/Potassium Chloride 15 meq/ Potassium Phosphate 15 mmol/ 

Magnesium Sulfate 10 meq/Calcium Gluconate 15 meq/ Multivitamins 10 ml/Chromium/

Copper/Manganese/ Seleni/Zn 0.5 ml/ Total Parenteral Nutrition/Amino Aci

ds/Dextrose/ Fat Emulsion Intravenous 1,400 ml @  58.333 mls/ hr TPN  CONT IV  

Last administered on 3/24/20at 22:17;  Start 3/24/20 at 22:00;  Stop 3/25/20 at 

21:59;  Status DC


Cefepime HCl (Maxipime) 2 gm Q12HR IVP  Last administered on 4/7/20at 20:56;  S

tart 3/25/20 at 09:00;  Stop 4/8/20 at 09:58;  Status DC


Daptomycin 500 mg/ Sodium Chloride 50 ml @  100 mls/hr Q48H IV  Last ad

ministered on 4/10/20at 09:57;  Start 3/25/20 at 08:30;  Stop 4/10/20 at 10:07; 

Status DC


Lidocaine HCl (Buffered Lidocaine 1%) 3 ml 1X  ONCE INJ  Last administered on 

3/25/20at 10:27;  Start 3/25/20 at 10:30;  Stop 3/25/20 at 10:31;  Status DC


Potassium Phosphate 20 mmol/ Sodium Chloride 106.6667 ml @  51.667 m... 1X  ONCE

IV  Last administered on 3/25/20at 12:51;  Start 3/25/20 at 13:00;  Stop 3/25/20

at 15:03;  Status DC


Sodium Chloride 90 meq/Potassium Chloride 15 meq/ Potassium Phosphate 18 mmol/ 

Magnesium Sulfate 8 meq/Calcium Gluconate 15 meq/ Multivitamins 10 ml/Chromium/ 

Copper/Manganese/ Seleni/Zn 0.5 ml/ Total Parenteral Nutrition/Amino 

Acids/Dextrose/ Fat Emulsion Intravenous 1,400 ml @  58.333 mls/ hr TPN  CONT IV

 Last administered on 3/25/20at 22:16;  Start 3/25/20 at 22:00;  Stop 3/26/20 at

21:59;  Status DC


Potassium Chloride 20 meq/ Bicarbonate Dialysis Soln w/ out KCl 5,010 ml @  

1,000 mls/hr Q5H1M IV  Last administered on 3/29/20at 14:54;  Start 3/25/20 at 

16:00;  Stop 3/29/20 at 19:59;  Status DC


Multi-Ingred Cream/Lotion/Oil/ Oint (Artificial Tears Eye Ointment) 1 thomas PRN 

Q1HR  PRN OU DRY EYE, 2nd choice Last administered on 4/13/20at 08:19;  Start 

3/25/20 at 17:30


Sodium Chloride 90 meq/Potassium Chloride 15 meq/ Potassium Phosphate 18 mmol/ 

Magnesium Sulfate 8 meq/Calcium Gluconate 15 meq/ Multivitamins 10 ml/Chromium/ 

Copper/Manganese/ Seleni/Zn 0.5 ml/ Total Parenteral Nutrition/Amino 

Acids/Dextrose/ Fat Emulsion Intravenous 1,400 ml @  58.333 mls/ hr TPN  CONT IV

 Last administered on 3/26/20at 22:00;  Start 3/26/20 at 22:00;  Stop 3/27/20 at

21:59;  Status DC


Albumin Human 500 ml @  125 mls/hr 1X  ONCE IV ;  Start 3/26/20 at 14:15;  Stop 

3/26/20 at 18:14;  Status DC


Sodium Chloride 90 meq/Potassium Chloride 15 meq/ Potassium Phosphate 18 mmol/ 

Magnesium Sulfate 8 meq/Calcium Gluconate 15 meq/ Multivitamins 10 ml/Chromium/ 

Copper/Manganese/ Seleni/Zn 0.5 ml/ Insulin Human Regular 10 unit/ Total 

Parenteral Nutrition/Amino Acids/Dextrose/ Fat Emulsion Intravenous 1,400 ml @  

58.333 mls/ hr TPN  CONT IV  Last administered on 3/27/20at 21:43;  Start 

3/27/20 at 22:00;  Stop 3/28/20 at 21:59;  Status DC


Lidocaine HCl (Buffered Lidocaine 1%) 3 ml STK-MED ONCE .ROUTE ;  Start 3/25/20 

at 10:00;  Stop 3/27/20 at 13:57;  Status DC


Midazolam HCl 100 mg/Sodium Chloride 100 ml @ 7 mls/hr CONT  PRN IV SEE PROTOCOL

Last administered on 4/8/20at 15:35;  Start 3/28/20 at 16:00


Sodium Chloride 90 meq/Potassium Chloride 15 meq/ Potassium Phosphate 18 mmol/ 

Magnesium Sulfate 8 meq/Calcium Gluconate 15 meq/ Multivitamins 10 ml/Chromium/ 

Copper/Manganese/ Seleni/Zn 0.5 ml/ Insulin Human Regular 15 unit/ Total 

Parenteral Nutrition/Amino Acids/Dextrose/ Fat Emulsion Intravenous 1,400 ml @  

58.333 mls/ hr TPN  CONT IV  Last administered on 3/28/20at 20:34;  Start 

3/28/20 at 22:00;  Stop 3/29/20 at 21:59;  Status DC


Info ([facility] Electrolyte Protocol) 1 ea CONT PRN  PRN MC PER PROTOCOL;  Start 

3/29/20 at 13:15


Sodium Chloride 90 meq/Potassium Chloride 15 meq/ Potassium Phosphate 18 mmol/ 

Magnesium Sulfate 8 meq/Calcium Gluconate 15 meq/ Multivitamins 10 ml/Chromium/ 

Copper/Manganese/ Seleni/Zn 0.5 ml/ Insulin Human Regular 15 unit/ Total Lisa

ral Nutrition/Amino Acids/Dextrose/ Fat Emulsion Intravenous 1,400 ml @  58.333 

mls/ hr TPN  CONT IV  Last administered on 3/29/20at 22:05;  Start 3/29/20 at 

22:00;  Stop 3/30/20 at 21:59;  Status DC


Potassium Chloride 15 meq/ Bicarbonate Dialysis Soln w/ out KCl 5,007.5 ml  @ 

1,000 mls/ hr Q5H1M IV  Last administered on 4/1/20at 18:14;  Start 3/29/20 at 

20:00;  Stop 4/2/20 at 13:08;  Status DC


Potassium Chloride 15 meq/ Bicarbonate Dialysis Soln w/ out KCl 5,007.5 ml  @ 

1,000 mls/ hr Q5H1M IV  Last administered on 4/1/20at 18:14;  Start 3/29/20 at 

20:00;  Stop 4/2/20 at 13:08;  Status DC


Potassium Chloride 15 meq/ Bicarbonate Dialysis Soln w/ out KCl 5,007.5 ml  @ 

1,000 mls/ hr Q5H1M IV  Last administered on 4/1/20at 18:14;  Start 3/29/20 at 

20:00;  Stop 4/2/20 at 13:08;  Status DC


Iohexol (Omnipaque 240 Mg/ml) 30 ml 1X  ONCE PO  Last administered on 3/30/20at 

11:30;  Start 3/30/20 at 11:30;  Stop 3/30/20 at 11:33;  Status DC


Info (CONTRAST GIVEN -- Rx MONITORING) 1 each PRN DAILY  PRN MC SEE COMMENTS;  

Start 3/30/20 at 11:45;  Stop 4/1/20 at 11:44;  Status DC


Sodium Chloride 90 meq/Potassium Chloride 15 meq/ Potassium Phosphate 18 mmol/ 

Magnesium Sulfate 8 meq/Calcium Gluconate 15 meq/ Multivitamins 10 ml/Chromium/ 

Copper/Manganese/ Seleni/Zn 0.5 ml/ Insulin Human Regular 15 unit/ Total 

Parenteral Nutrition/Amino Acids/Dextrose/ Fat Emulsion Intravenous 1,400 ml @  

58.333 mls/ hr TPN  CONT IV  Last administered on 3/30/20at 21:47;  Start 

3/30/20 at 22:00;  Stop 3/31/20 at 21:59;  Status DC


Sodium Chloride 90 meq/Potassium Chloride 15 meq/ Potassium Phosphate 18 mmol/ 

Magnesium Sulfate 8 meq/Calcium Gluconate 15 meq/ Multivitamins 10 ml/Chromium/ 

Copper/Manganese/ Seleni/Zn 0.5 ml/ Insulin Human Regular 20 unit/ Total 

Parenteral Nutrition/Amino Acids/Dextrose/ Fat Emulsion Intravenous 1,400 ml @  

58.333 mls/ hr TPN  CONT IV  Last administered on 3/31/20at 21:36;  Start 

3/31/20 at 22:00;  Stop 4/1/20 at 21:59;  Status DC


Alteplase, Recombinant (Cathflo For Central Catheter Clearance) 1 mg 1X  ONCE 

INT CAT  Last administered on 3/31/20at 20:03;  Start 3/31/20 at 19:30;  Stop 

3/31/20 at 19:46;  Status DC


Alteplase, Recombinant (Cathflo For Central Catheter Clearance) 1 mg 1X  ONCE 

INT CAT  Last administered on 3/31/20at 22:05;  Start 3/31/20 at 22:00;  Stop 

3/31/20 at 22:01;  Status DC


Sodium Chloride 90 meq/Potassium Chloride 15 meq/ Potassium Phosphate 18 mmol/ 

Magnesium Sulfate 8 meq/Calcium Gluconate 15 meq/ Multivitamins 10 ml/Chromium/ 

Copper/Manganese/ Seleni/Zn 0.5 ml/ Insulin Human Regular 20 unit/ Total Par

enteral Nutrition/Amino Acids/Dextrose/ Fat Emulsion Intravenous 1,400 ml @  

58.333 mls/ hr TPN  CONT IV  Last administered on 4/1/20at 21:30;  Start 4/1/20 

at 22:00;  Stop 4/2/20 at 21:59;  Status DC


Dexmedetomidine HCl 400 mcg/ Sodium Chloride 100 ml @ 0 mls/hr CONT  PRN IV 

ANXIETY / AGITATION Last administered on 5/7/20at 12:25;  Start 4/2/20 at 08:15


Sodium Chloride 500 ml @  500 mls/hr 1X PRN  PRN IV ELEVATED BP, SEE COMMENTS;  

Start 4/2/20 at 08:15


Atropine Sulfate (ATROPINE 0.5mg SYRINGE) 0.5 mg PRN Q5MIN  PRN IV SEE COMMENTS;

 Start 4/2/20 at 08:15


Furosemide (Lasix) 20 mg 1X  ONCE IVP  Last administered on 4/2/20at 08:19;  

Start 4/2/20 at 08:15;  Stop 4/2/20 at 08:16;  Status DC


Lidocaine HCl (Buffered Lidocaine 1%) 3 ml STK-MED ONCE .ROUTE ;  Start 4/2/20 

at 08:39;  Stop 4/2/20 at 08:39;  Status DC


Lidocaine HCl (Buffered Lidocaine 1%) 6 ml 1X  ONCE INJ  Last administered on 

4/2/20at 09:05;  Start 4/2/20 at 09:00;  Stop 4/2/20 at 09:06;  Status DC


Sodium Chloride 90 meq/Potassium Chloride 15 meq/ Potassium Phosphate 18 mmol/ 

Magnesium Sulfate 8 meq/Calcium Gluconate 15 meq/ Multivitamins 10 ml/Chromium/ 

Copper/Manganese/ Seleni/Zn 0.5 ml/ Insulin Human Regular 20 unit/ Total 

Parenteral Nutrition/Amino Acids/Dextrose/ Fat Emulsion Intravenous 1,400 ml @  

58.333 mls/ hr TPN  CONT IV  Last administered on 4/2/20at 22:45;  Start 4/2/20 

at 22:00;  Stop 4/3/20 at 21:59;  Status DC


Sodium Chloride 1,000 ml @  1,000 mls/hr Q1H PRN IV hypotension;  Start 4/3/20 

at 07:30;  Stop 4/3/20 at 13:29;  Status DC


Albumin Human 200 ml @  200 mls/hr 1X PRN  PRN IV Hypotension Last administered 

on 4/3/20at 09:36;  Start 4/3/20 at 07:30;  Stop 4/3/20 at 13:29;  Status DC


Sodium Chloride (Normal Saline Flush) 10 ml 1X PRN  PRN IV AP catheter pack;  

Start 4/3/20 at 07:30;  Stop 4/3/20 at 21:29;  Status DC


Sodium Chloride (Normal Saline Flush) 10 ml 1X PRN  PRN IV  catheter pack;  

Start 4/3/20 at 07:30;  Stop 4/4/20 at 07:29;  Status DC


Sodium Chloride 1,000 ml @  400 mls/hr Q2H30M PRN IV PATENCY;  Start 4/3/20 at 

07:30;  Stop 4/3/20 at 19:29;  Status DC


Info (PHARMACY MONITORING -- do not chart) 1 each PRN DAILY  PRN MC SEE 

COMMENTS;  Start 4/3/20 at 07:30;  Stop 4/3/20 at 13:02;  Status DC


Info (PHARMACY MONITORING -- do not chart) 1 each PRN DAILY  PRN MC SEE 

COMMENTS;  Start 4/3/20 at 07:30;  Stop 4/5/20 at 12:45;  Status DC


Sodium Chloride 90 meq/Potassium Chloride 15 meq/ Potassium Phosphate 10 mmol/ 

Magnesium Sulfate 8 meq/Calcium Gluconate 15 meq/ Multivitamins 10 ml/Chromium/ 

Copper/Manganese/ Seleni/Zn 0.5 ml/ Insulin Human Regular 25 unit/ Total 

Parenteral Nutrition/Amino Acids/Dextrose/ Fat Emulsion Intravenous 1,400 ml @  

58.333 mls/ hr TPN  CONT IV  Last administered on 4/3/20at 22:19;  Start 4/3/20 

at 22:00;  Stop 4/4/20 at 21:59;  Status DC


Heparin Sodium (Porcine) (Heparin Sodium) 5,000 unit Q12HR SQ  Last administered

on 4/26/20at 08:59;  Start 4/3/20 at 21:00;  Stop 4/26/20 at 10:05;  Status DC


Ondansetron HCl (Zofran) 4 mg PRN Q6HRS  PRN IV NAUSEA/VOMITING;  Start 4/6/20 

at 07:00;  Stop 4/7/20 at 06:59;  Status DC


Fentanyl Citrate (Fentanyl 2ml Vial) 25 mcg PRN Q5MIN  PRN IV MILD PAIN 1-3;  

Start 4/6/20 at 07:00;  Stop 4/7/20 at 06:59;  Status DC


Fentanyl Citrate (Fentanyl 2ml Vial) 50 mcg PRN Q5MIN  PRN IV MODERATE TO SEVERE

PAIN;  Start 4/6/20 at 07:00;  Stop 4/7/20 at 06:59;  Status DC


Ringer's Solution 1,000 ml @  30 mls/hr Q24H IV ;  Start 4/6/20 at 07:00;  Stop 

4/6/20 at 18:59;  Status DC


Lidocaine HCl (Xylocaine-Mpf 1% 2ml Vial) 2 ml PRN 1X  PRN ID PRIOR TO IV START;

 Start 4/6/20 at 07:00;  Stop 4/7/20 at 06:59;  Status DC


Prochlorperazine Edisylate (Compazine) 5 mg PACU PRN  PRN IV NAUSEA, MRX1;  

Start 4/6/20 at 07:00;  Stop 4/7/20 at 06:59;  Status DC


Sodium Chloride 1,000 ml @  1,000 mls/hr Q1H PRN IV hypotension;  Start 4/4/20 

at 09:10;  Stop 4/4/20 at 15:09;  Status DC


Albumin Human 200 ml @  200 mls/hr 1X PRN  PRN IV Hypotension Last administered 

on 4/4/20at 10:10;  Start 4/4/20 at 09:15;  Stop 4/4/20 at 15:14;  Status DC


Sodium Chloride 1,000 ml @  400 mls/hr Q2H30M PRN IV PATENCY;  Start 4/4/20 at 

09:10;  Stop 4/4/20 at 21:09;  Status DC


Info (PHARMACY MONITORING -- do not chart) 1 each PRN DAILY  PRN MC SEE 

COMMENTS;  Start 4/4/20 at 09:15;  Stop 4/5/20 at 12:45;  Status DC


Info (PHARMACY MONITORING -- do not chart) 1 each PRN DAILY  PRN MC SEE COM

MENTS;  Start 4/4/20 at 09:15;  Stop 4/5/20 at 12:45;  Status DC


Sodium Chloride 90 meq/Potassium Chloride 15 meq/ Potassium Phosphate 10 mmol/ 

Magnesium Sulfate 8 meq/Calcium Gluconate 15 meq/ Multivitamins 10 ml/Chromium/ 

Copper/Manganese/ Seleni/Zn 0.5 ml/ Insulin Human Regular 25 unit/ Total 

Parenteral Nutrition/Amino Acids/Dextrose/ Fat Emulsion Intravenous 1,400 ml @  

58.333 mls/ hr TPN  CONT IV  Last administered on 4/4/20at 22:10;  Start 4/4/20 

at 22:00;  Stop 4/5/20 at 21:59;  Status DC


Magnesium Sulfate 50 ml @ 25 mls/hr PRN DAILY  PRN IV for Mag < 1.7 on am labs 

Last administered on 4/20/20at 17:27;  Start 4/5/20 at 09:15


Sodium Chloride 90 meq/Potassium Chloride 15 meq/ Potassium Phosphate 10 mmol/ 

Magnesium Sulfate 8 meq/Calcium Gluconate 15 meq/ Multivitamins 10 ml/Chromium/ 

Copper/Manganese/ Seleni/Zn 0.5 ml/ Insulin Human Regular 25 unit/ Total 

Parenteral Nutrition/Amino Acids/Dextrose/ Fat Emulsion Intravenous 1,400 ml @  

58.333 mls/ hr TPN  CONT IV  Last administered on 4/5/20at 21:20;  Start 4/5/20 

at 22:00;  Stop 4/6/20 at 21:59;  Status DC


Sodium Chloride 1,000 ml @  1,000 mls/hr Q1H PRN IV hypotension;  Start 4/5/20 

at 12:23;  Stop 4/5/20 at 18:22;  Status DC


Albumin Human 200 ml @  200 mls/hr 1X  ONCE IV  Last administered on 4/5/20at 13

:34;  Start 4/5/20 at 12:30;  Stop 4/5/20 at 13:29;  Status DC


Diphenhydramine HCl (Benadryl) 25 mg 1X PRN  PRN IV ITCHING;  Start 4/5/20 at 

12:30;  Stop 4/6/20 at 12:29;  Status DC


Diphenhydramine HCl (Benadryl) 25 mg 1X PRN  PRN IV ITCHING;  Start 4/5/20 at 

12:30;  Stop 4/6/20 at 12:29;  Status DC


Info (PHARMACY MONITORING -- do not chart) 1 each PRN DAILY  PRN MC SEE 

COMMENTS;  Start 4/5/20 at 12:30;  Status Cancel


Bupivacaine HCl/ Epinephrine Bitart (Sensorcain-Epi 0.5%-1:076822 Mpf) 30 ml 

STK-MED ONCE .ROUTE  Last administered on 4/6/20at 11:44;  Start 4/6/20 at 

11:00;  Stop 4/6/20 at 11:01;  Status DC


Cellulose (Surgicel Fibrillar 1x2) 1 each STK-MED ONCE .ROUTE ;  Start 4/6/20 at

11:00;  Stop 4/6/20 at 11:01;  Status DC


Sodium Chloride 90 meq/Potassium Chloride 15 meq/ Potassium Phosphate 10 mmol/ 

Magnesium Sulfate 12 meq/Calcium Gluconate 15 meq/ Multivitamins 10 ml/Chromium/

Copper/Manganese/ Seleni/Zn 0.5 ml/ Insulin Human Regular 25 unit/ Total 

Parenteral Nutrition/Amino Acids/Dextrose/ Fat Emulsion Intravenous 1,400 ml @  

58.333 mls/ hr TPN  CONT IV  Last administered on 4/6/20at 22:24;  Start 4/6/20 

at 22:00;  Stop 4/7/20 at 21:59;  Status DC


Propofol 20 ml @ As Directed STK-MED ONCE IV ;  Start 4/6/20 at 11:07;  Stop 

4/6/20 at 11:07;  Status DC


Cellulose (Surgicel Hemostat 4x8) 1 each STK-MED ONCE .ROUTE  Last administered 

on 4/6/20at 11:44;  Start 4/6/20 at 11:55;  Stop 4/6/20 at 11:56;  Status DC


Sevoflurane (Ultane) 60 ml STK-MED ONCE IH ;  Start 4/6/20 at 12:46;  Stop 

4/6/20 at 12:46;  Status DC


Sodium Chloride 1,000 ml @  1,000 mls/hr Q1H PRN IV hypotension;  Start 4/6/20 

at 13:51;  Stop 4/6/20 at 19:50;  Status DC


Albumin Human 200 ml @  200 mls/hr 1X PRN  PRN IV Hypotension Last administered 

on 4/6/20at 14:51;  Start 4/6/20 at 14:00;  Stop 4/6/20 at 19:59;  Status DC


Diphenhydramine HCl (Benadryl) 25 mg 1X PRN  PRN IV ITCHING;  Start 4/6/20 at 

14:00;  Stop 4/7/20 at 13:59;  Status DC


Diphenhydramine HCl (Benadryl) 25 mg 1X PRN  PRN IV ITCHING;  Start 4/6/20 at 

14:00;  Stop 4/7/20 at 13:59;  Status DC


Sodium Chloride 1,000 ml @  400 mls/hr Q2H30M PRN IV PATENCY;  Start 4/6/20 at 

13:51;  Stop 4/7/20 at 01:50;  Status DC


Info (PHARMACY MONITORING -- do not chart) 1 each PRN DAILY  PRN MC SEE 

COMMENTS;  Start 4/6/20 at 14:00;  Stop 4/9/20 at 08:16;  Status DC


Heparin Sodium (Porcine) (Hep Lock Adult) 500 unit STK-MED ONCE IVP ;  Start 

4/7/20 at 09:29;  Stop 4/7/20 at 09:30;  Status DC


Sodium Chloride 1,000 ml @  1,000 mls/hr Q1H PRN IV hypotension;  Start 4/7/20 

at 10:43;  Stop 4/7/20 at 16:42;  Status DC


Sodium Chloride 1,000 ml @  400 mls/hr Q2H30M PRN IV PATENCY;  Start 4/7/20 at 

10:43;  Stop 4/7/20 at 22:42;  Status DC


Info (PHARMACY MONITORING -- do not chart) 1 each PRN DAILY  PRN MC SEE 

COMMENTS;  Start 4/7/20 at 10:45;  Status UNV


Info (PHARMACY MONITORING -- do not chart) 1 each PRN DAILY  PRN MC SEE 

COMMENTS;  Start 4/7/20 at 10:45;  Status UNV


Sodium Chloride 90 meq/Potassium Chloride 15 meq/ Magnesium Sulfate 12 

meq/Calcium Gluconate 15 meq/ Multivitamins 10 ml/Chromium/ Copper/Manganese/ 

Seleni/Zn 0.5 ml/ Insulin Human Regular 25 unit/ Total Parenteral 

Nutrition/Amino Acids/Dextrose/ Fat Emulsion Intravenous 1,400 ml @  58.333 mls/

hr TPN  CONT IV  Last administered on 4/7/20at 22:13;  Start 4/7/20 at 22:00;  

Stop 4/8/20 at 21:59;  Status DC


Sodium Chloride 1,000 ml @  1,000 mls/hr Q1H PRN IV hypotension;  Start 4/8/20 

at 07:50;  Stop 4/8/20 at 13:49;  Status DC


Albumin Human 200 ml @  200 mls/hr 1X  ONCE IV ;  Start 4/8/20 at 08:00;  Stop 

4/8/20 at 08:53;  Status DC


Diphenhydramine HCl (Benadryl) 25 mg 1X PRN  PRN IV ITCHING;  Start 4/8/20 at 

08:00;  Stop 4/9/20 at 07:59;  Status DC


Diphenhydramine HCl (Benadryl) 25 mg 1X PRN  PRN IV ITCHING;  Start 4/8/20 at 

08:00;  Stop 4/9/20 at 07:59;  Status DC


Info (PHARMACY MONITORING -- do not chart) 1 each PRN DAILY  PRN MC SEE 

COMMENTS;  Start 4/8/20 at 08:00;  Stop 4/9/20 at 08:16;  Status DC


Albumin Human 50 ml @ 50 mls/hr 1X  ONCE IV ;  Start 4/8/20 at 08:53;  Stop 

4/8/20 at 08:56;  Status DC


Albumin Human 200 ml @  50 mls/hr PRN 1X  PRN IV HYPOTENSION Last administered 

on 4/14/20at 11:54;  Start 4/8/20 at 09:00


Meropenem 500 mg/ Sodium Chloride 50 ml @  100 mls/hr Q12H IV  Last administered

on 4/28/20at 10:45;  Start 4/8/20 at 10:00;  Stop 4/28/20 at 12:37;  Status DC


Sodium Chloride 90 meq/Magnesium Sulfate 12 meq/ Calcium Gluconate 15 meq/ 

Multivitamins 10 ml/Chromium/ Copper/Manganese/ Seleni/Zn 0.5 ml/ Insulin Human 

Regular 25 unit/ Total Parenteral Nutrition/Amino Acids/Dextrose/ Fat Emulsion 

Intravenous 1,400 ml @  58.333 mls/ hr TPN  CONT IV  Last administered on 

4/8/20at 21:41;  Start 4/8/20 at 22:00;  Stop 4/9/20 at 21:59;  Status DC


Sodium Chloride 1,000 ml @  1,000 mls/hr Q1H PRN IV hypotension;  Start 4/9/20 

at 07:58;  Stop 4/9/20 at 13:57;  Status DC


Albumin Human 200 ml @  200 mls/hr 1X PRN  PRN IV Hypotension Last administered 

on 4/9/20at 09:30;  Start 4/9/20 at 08:00;  Stop 4/9/20 at 13:59;  Status DC


Sodium Chloride 1,000 ml @  400 mls/hr Q2H30M PRN IV PATENCY;  Start 4/9/20 at 

07:58;  Stop 4/9/20 at 19:57;  Status DC


Info (PHARMACY MONITORING -- do not chart) 1 each PRN DAILY  PRN MC SEE 

COMMENTS;  Start 4/9/20 at 08:00;  Status Cancel


Info (PHARMACY MONITORING -- do not chart) 1 each PRN DAILY  PRN MC SEE 

COMMENTS;  Start 4/9/20 at 08:15;  Status UNV


Sodium Chloride 90 meq/Potassium Phosphate 5 mmol/ Magnesium Sulfate 12 

meq/Calcium Gluconate 15 meq/ Multivitamins 10 ml/Chromium/ Copper/Manganese/ 

Seleni/Zn 0.5 ml/ Insulin Human Regular 30 unit/ Total Parenteral 

Nutrition/Amino Acids/Dextrose/ Fat Emulsion Intravenous 1,400 ml @  58.333 mls/

hr TPN  CONT IV  Last administered on 4/9/20at 22:08;  Start 4/9/20 at 22:00;  

Stop 4/10/20 at 21:59;  Status DC


Linezolid/Dextrose 300 ml @  300 mls/hr Q12HR IV  Last administered on 4/20/20at

20:40;  Start 4/10/20 at 11:00;  Stop 4/21/20 at 08:10;  Status DC


Sodium Chloride 90 meq/Potassium Phosphate 15 mmol/ Magnesium Sulfate 12 

meq/Calcium Gluconate 15 meq/ Multivitamins 10 ml/Chromium/ Copper/Manganese/ 

Seleni/Zn 0.5 ml/ Insulin Human Regular 30 unit/ Total Parenteral 

Nutrition/Amino Acids/Dextrose/ Fat Emulsion Intravenous 1,400 ml @  58.333 mls/

hr TPN  CONT IV  Last administered on 4/10/20at 21:49;  Start 4/10/20 at 22:00; 

Stop 4/11/20 at 21:59;  Status DC


Sodium Chloride 90 meq/Potassium Phosphate 15 mmol/ Magnesium Sulfate 12 

meq/Calcium Gluconate 15 meq/ Multivitamins 10 ml/Chromium/ Copper/Manganese/ 

Seleni/Zn 0.5 ml/ Insulin Human Regular 40 unit/ Total Parenteral 

Nutrition/Amino Acids/Dextrose/ Fat Emulsion Intravenous 1,400 ml @  58.333 mls/

hr TPN  CONT IV  Last administered on 4/11/20at 21:21;  Start 4/11/20 at 22:00; 

Stop 4/12/20 at 21:59;  Status DC


Sodium Chloride 1,000 ml @  1,000 mls/hr Q1H PRN IV hypotension;  Start 4/11/20 

at 13:26;  Stop 4/11/20 at 19:25;  Status DC


Albumin Human 200 ml @  200 mls/hr 1X PRN  PRN IV Hypotension Last administered 

on 4/11/20at 15:00;  Start 4/11/20 at 13:30;  Stop 4/11/20 at 19:29;  Status DC


Sodium Chloride (Normal Saline Flush) 10 ml 1X PRN  PRN IV AP catheter pack;  

Start 4/11/20 at 13:30;  Stop 4/12/20 at 13:29;  Status DC


Sodium Chloride (Normal Saline Flush) 10 ml 1X PRN  PRN IV  catheter pack;  

Start 4/11/20 at 13:30;  Stop 4/12/20 at 13:29;  Status DC


Sodium Chloride 1,000 ml @  400 mls/hr Q2H30M PRN IV PATENCY;  Start 4/11/20 at 

13:26;  Stop 4/12/20 at 01:25;  Status DC


Info (PHARMACY MONITORING -- do not chart) 1 each PRN DAILY  PRN MC SEE 

COMMENTS;  Start 4/11/20 at 13:30;  Stop 4/11/20 at 13:33;  Status DC


Info (PHARMACY MONITORING -- do not chart) 1 each PRN DAILY  PRN MC SEE 

COMMENTS;  Start 4/11/20 at 13:30;  Stop 4/11/20 at 13:34;  Status DC


Sodium Chloride 90 meq/Potassium Phosphate 19 mmol/ Magnesium Sulfate 12 

meq/Calcium Gluconate 15 meq/ Multivitamins 10 ml/Chromium/ Copper/Manganese/ 

Seleni/Zn 0.5 ml/ Insulin Human Regular 40 unit/ Total Parenteral 

Nutrition/Amino Acids/Dextrose/ Fat Emulsion Intravenous 1,400 ml @  58.333 mls/

hr TPN  CONT IV  Last administered on 4/12/20at 21:54;  Start 4/12/20 at 22:00; 

Stop 4/13/20 at 21:59;  Status DC


Sodium Chloride 1,000 ml @  1,000 mls/hr Q1H PRN IV hypotension;  Start 4/13/20 

at 09:35;  Stop 4/13/20 at 15:34;  Status DC


Albumin Human 200 ml @  200 mls/hr 1X PRN  PRN IV Hypotension;  Start 4/13/20 at

09:45;  Stop 4/13/20 at 15:44;  Status DC


Diphenhydramine HCl (Benadryl) 25 mg 1X PRN  PRN IV ITCHING;  Start 4/13/20 at 

09:45;  Stop 4/14/20 at 09:44;  Status DC


Diphenhydramine HCl (Benadryl) 25 mg 1X PRN  PRN IV ITCHING;  Start 4/13/20 at 

09:45;  Stop 4/14/20 at 09:44;  Status DC


Sodium Chloride 1,000 ml @  400 mls/hr Q2H30M PRN IV PATENCY;  Start 4/13/20 at 

09:35;  Stop 4/13/20 at 21:34;  Status DC


Info (PHARMACY MONITORING -- do not chart) 1 each PRN DAILY  PRN MC SEE 

COMMENTS;  Start 4/13/20 at 09:45;  Status Cancel


Sodium Chloride 100 meq/Potassium Phosphate 19 mmol/ Magnesium Sulfate 12 

meq/Calcium Gluconate 15 meq/ Multivitamins 10 ml/Chromium/ Copper/Manganese/ 

Seleni/Zn 0.5 ml/ Insulin Human Regular 40 unit/ Potassium Chloride 20 meq/ 

Total Parenteral Nutrition/Amino Acids/Dextrose/ Fat Emulsion Intravenous 1,400 

ml @  58.333 mls/ hr TPN  CONT IV  Last administered on 4/13/20at 22:02;  Start 

4/13/20 at 22:00;  Stop 4/14/20 at 21:59;  Status DC


Furosemide (Lasix) 40 mg 1X  ONCE IVP  Last administered on 4/13/20at 14:39;  

Start 4/13/20 at 14:30;  Stop 4/13/20 at 14:31;  Status DC


Metronidazole 100 ml @  100 mls/hr Q8HRS IV  Last administered on 4/21/20at 

06:04;  Start 4/14/20 at 10:00;  Stop 4/21/20 at 08:10;  Status DC


Sodium Chloride 1,000 ml @  1,000 mls/hr Q1H PRN IV hypotension;  Start 4/14/20 

at 08:00;  Stop 4/14/20 at 13:59;  Status DC


Albumin Human 200 ml @  200 mls/hr 1X PRN  PRN IV Hypotension;  Start 4/14/20 at

08:00;  Stop 4/14/20 at 13:59;  Status DC


Sodium Chloride 1,000 ml @  400 mls/hr Q2H30M PRN IV PATENCY;  Start 4/14/20 at 

08:00;  Stop 4/14/20 at 19:59;  Status DC


Info (PHARMACY MONITORING -- do not chart) 1 each PRN DAILY  PRN MC SEE 

COMMENTS;  Start 4/14/20 at 11:30;  Status UNV


Info (PHARMACY MONITORING -- do not chart) 1 each PRN DAILY  PRN MC SEE 

COMMENTS;  Start 4/14/20 at 11:30;  Stop 4/16/20 at 12:13;  Status DC


Sodium Chloride 100 meq/Potassium Phosphate 19 mmol/ Magnesium Sulfate 12 

meq/Calcium Gluconate 15 meq/ Multivitamins 10 ml/Chromium/ Copper/Manganese/ 

Seleni/Zn 0.5 ml/ Insulin Human Regular 40 unit/ Potassium Chloride 20 meq/ 

Total Parenteral Nutrition/Amino Acids/Dextrose/ Fat Emulsion Intravenous 1,400 

ml @  58.333 mls/ hr TPN  CONT IV  Last administered on 4/14/20at 21:52;  Start 

4/14/20 at 22:00;  Stop 4/15/20 at 21:59;  Status DC


Sodium Chloride (Normal Saline Flush) 10 ml QSHIFT  PRN IV AFTER MEDS AND BLOOD 

DRAWS;  Start 4/14/20 at 15:00


Sodium Chloride (Normal Saline Flush) 10 ml PRN Q5MIN  PRN IV AFTER MEDS AND 

BLOOD DRAWS;  Start 4/14/20 at 15:00


Sodium Chloride (Normal Saline Flush) 20 ml PRN Q5MIN  PRN IV AFTER MEDS AND 

BLOOD DRAWS;  Start 4/14/20 at 15:00


Sodium Chloride 100 meq/Potassium Phosphate 19 mmol/ Magnesium Sulfate 12 

meq/Calcium Gluconate 15 meq/ Multivitamins 10 ml/Chromium/ Copper/Manganese/ 

Seleni/Zn 0.5 ml/ Insulin Human Regular 40 unit/ Potassium Chloride 20 meq/ 

Total Parenteral Nutrition/Amino Acids/Dextrose/ Fat Emulsion Intravenous 1,400 

ml @  58.333 mls/ hr TPN  CONT IV  Last administered on 4/15/20at 21:20;  Start 

4/15/20 at 22:00;  Stop 4/16/20 at 21:59;  Status DC


Lidocaine HCl (Buffered Lidocaine 1%) 3 ml STK-MED ONCE .ROUTE ;  Start 4/15/20 

at 13:16;  Stop 4/15/20 at 13:16;  Status DC


Lidocaine HCl (Buffered Lidocaine 1%) 6 ml 1X  ONCE INJ  Last administered on 

4/15/20at 13:45;  Start 4/15/20 at 13:30;  Stop 4/15/20 at 13:31;  Status DC


Albumin Human 100 ml @  100 mls/hr 1X  ONCE IV  Last administered on 4/15/20at 1

5:41;  Start 4/15/20 at 15:00;  Stop 4/15/20 at 15:59;  Status DC


Albumin Human 50 ml @ 50 mls/hr 1X  ONCE IV  Last administered on 4/15/20at 

15:00;  Start 4/15/20 at 15:00;  Stop 4/15/20 at 15:59;  Status DC


Info (PHARMACY MONITORING -- do not chart) 1 each PRN DAILY  PRN MC SEE 

COMMENTS;  Start 4/16/20 at 11:30;  Status Cancel


Info (PHARMACY MONITORING -- do not chart) 1 each PRN DAILY  PRN MC SEE 

COMMENTS;  Start 4/16/20 at 11:30;  Status UNV


Sodium Chloride 100 meq/Potassium Phosphate 10 mmol/ Magnesium Sulfate 12 

meq/Calcium Gluconate 15 meq/ Multivitamins 10 ml/Chromium/ Copper/Manganese/ 

Seleni/Zn 0.5 ml/ Insulin Human Regular 35 unit/ Potassium Chloride 20 meq/ 

Total Parenteral Nutrition/Amino Acids/Dextrose/ Fat Emulsion Intravenous 1,400 

ml @  58.333 mls/ hr TPN  CONT IV  Last administered on 4/16/20at 22:10;  Start 

4/16/20 at 22:00;  Stop 4/17/20 at 21:59;  Status DC


Sodium Chloride 100 meq/Potassium Phosphate 5 mmol/ Magnesium Sulfate 12 

meq/Calcium Gluconate 15 meq/ Multivitamins 10 ml/Chromium/ Copper/Manganese/ 

Seleni/Zn 0.5 ml/ Insulin Human Regular 35 unit/ Potassium Chloride 20 meq/ 

Total Parenteral Nutrition/Amino Acids/Dextrose/ Fat Emulsion Intravenous 1,400 

ml @  58.333 mls/ hr TPN  CONT IV  Last administered on 4/17/20at 22:59;  Start 

4/17/20 at 22:00;  Stop 4/18/20 at 21:59;  Status DC


Sodium Chloride 1,000 ml @  1,000 mls/hr Q1H PRN IV hypotension;  Start 4/18/20 

at 08:27;  Stop 4/18/20 at 14:26;  Status DC


Albumin Human 200 ml @  200 mls/hr 1X PRN  PRN IV Hypotension Last administered 

on 4/18/20at 09:18;  Start 4/18/20 at 08:30;  Stop 4/18/20 at 14:29;  Status DC


Sodium Chloride 1,000 ml @  400 mls/hr Q2H30M PRN IV PATENCY;  Start 4/18/20 at 

08:27;  Stop 4/18/20 at 20:26;  Status DC


Info (PHARMACY MONITORING -- do not chart) 1 each PRN DAILY  PRN MC SEE 

COMMENTS;  Start 4/18/20 at 08:30;  Status Cancel


Info (PHARMACY MONITORING -- do not chart) 1 each PRN DAILY  PRN MC SEE 

COMMENTS;  Start 4/18/20 at 08:30;  Stop 4/26/20 at 13:10;  Status DC


Sodium Chloride 100 meq/Potassium Chloride 40 meq/ Magnesium Sulfate 15 

meq/Calcium Gluconate 15 meq/ Multivitamins 10 ml/Chromium/ Copper/Manganese/ 

Seleni/Zn 0.5 ml/ Insulin Human Regular 35 unit/ Total Parenteral 

Nutrition/Amino Acids/Dextrose/ Fat Emulsion Intravenous 1,400 ml @  58.333 mls/

hr TPN  CONT IV  Last administered on 4/18/20at 22:00;  Start 4/18/20 at 22:00; 

Stop 4/19/20 at 21:59;  Status DC


Potassium Chloride/Water 100 ml @  100 mls/hr 1X  ONCE IV  Last administered on 

4/18/20at 17:28;  Start 4/18/20 at 14:45;  Stop 4/18/20 at 15:44;  Status DC


Sodium Chloride 100 meq/Potassium Chloride 40 meq/ Magnesium Sulfate 15 

meq/Calcium Gluconate 15 meq/ Multivitamins 10 ml/Chromium/ Copper/Manganese/ 

Seleni/Zn 0.5 ml/ Insulin Human Regular 35 unit/ Total Parenteral 

Nutrition/Amino Acids/Dextrose/ Fat Emulsion Intravenous 1,400 ml @  58.333 mls/

hr TPN  CONT IV  Last administered on 4/19/20at 22:46;  Start 4/19/20 at 22:00; 

Stop 4/20/20 at 21:59;  Status DC


Sodium Chloride 100 meq/Potassium Chloride 40 meq/ Magnesium Sulfate 20 

meq/Calcium Gluconate 15 meq/ Multivitamins 10 ml/Chromium/ Copper/Manganese/ 

Seleni/Zn 0.5 ml/ Insulin Human Regular 35 unit/ Total Parenteral N

utrition/Amino Acids/Dextrose/ Fat Emulsion Intravenous 1,400 ml @  58.333 mls/ 

hr TPN  CONT IV  Last administered on 4/20/20at 22:31;  Start 4/20/20 at 22:00; 

Stop 4/21/20 at 21:59;  Status DC


Fentanyl Citrate (Fentanyl 2ml Vial) 50 mcg PRN Q2HR  PRN IVP PAIN Last 

administered on 4/27/20at 13:32;  Start 4/20/20 at 21:00;  Stop 4/28/20 at 

12:53;  Status DC


Fentanyl Citrate (Fentanyl 2ml Vial) 25 mcg PRN Q2HR  PRN IVP PAIN;  Start 

4/20/20 at 21:00;  Stop 4/28/20 at 12:54;  Status DC


Enoxaparin Sodium (Lovenox 100mg Syringe) 100 mg Q12HR SQ ;  Start 4/21/20 at 

21:00;  Status UNV


Amino Acids/ Glycerin/ Electrolytes 1,000 ml @  75 mls/hr [id number] IV ;  Start 

4/20/20 at 21:15;  Status UNV


Sodium Chloride 1,000 ml @  1,000 mls/hr Q1H PRN IV hypotension;  Start 4/21/20 

at 07:56;  Stop 4/21/20 at 13:55;  Status DC


Albumin Human 200 ml @  200 mls/hr 1X PRN  PRN IV Hypotension Last administered 

on 4/21/20at 08:40;  Start 4/21/20 at 08:00;  Stop 4/21/20 at 13:59;  Status DC


Sodium Chloride 1,000 ml @  400 mls/hr Q2H30M PRN IV PATENCY;  Start 4/21/20 at 

07:56;  Stop 4/21/20 at 19:55;  Status DC


Info (PHARMACY MONITORING -- do not chart) 1 each PRN DAILY  PRN MC SEE 

COMMENTS;  Start 4/21/20 at 08:00;  Status UNV


Info (PHARMACY MONITORING -- do not chart) 1 each PRN DAILY  PRN MC SEE 

COMMENTS;  Start 4/21/20 at 08:00;  Status UNV


Daptomycin 430 mg/ Sodium Chloride 50 ml @  100 mls/hr Q24H IV  Last a

dministered on 4/21/20at 12:35;  Start 4/21/20 at 09:00;  Stop 4/21/20 at 12:49;

 Status DC


Sodium Chloride 100 meq/Potassium Chloride 40 meq/ Magnesium Sulfate 20 

meq/Calcium Gluconate 15 meq/ Multivitamins 10 ml/Chromium/ Copper/Manganese/ 

Seleni/Zn 0.5 ml/ Insulin Human Regular 35 unit/ Total Parenteral 

Nutrition/Amino Acids/Dextrose/ Fat Emulsion Intravenous 1,400 ml @  58.333 mls/

hr TPN  CONT IV  Last administered on 4/21/20at 21:26;  Start 4/21/20 at 22:00; 

Stop 4/22/20 at 21:59;  Status DC


Daptomycin 430 mg/ Sodium Chloride 50 ml @  100 mls/hr Q48H IV ;  Start 4/23/20 

at 09:00;  Stop 4/22/20 at 11:55;  Status DC


Sodium Chloride 100 meq/Potassium Chloride 40 meq/ Magnesium Sulfate 20 

meq/Calcium Gluconate 15 meq/ Multivitamins 10 ml/Chromium/ Copper/Manganese/ 

Seleni/Zn 0.5 ml/ Insulin Human Regular 35 unit/ Total Parenteral 

Nutrition/Amino Acids/Dextrose/ Fat Emulsion Intravenous 1,400 ml @  58.333 mls/

hr TPN  CONT IV  Last administered on 4/22/20at 22:27;  Start 4/22/20 at 22:00; 

Stop 4/23/20 at 21:59;  Status DC


Daptomycin 430 mg/ Sodium Chloride 50 ml @  100 mls/hr Q24H IV  Last 

administered on 4/24/20at 15:07;  Start 4/22/20 at 13:00;  Stop 4/25/20 at 

13:15;  Status DC


Sodium Chloride 100 meq/Potassium Chloride 40 meq/ Magnesium Sulfate 20 meq/C

alcium Gluconate 10 meq/ Multivitamins 10 ml/Chromium/ Copper/Manganese/ 

Seleni/Zn 0.5 ml/ Insulin Human Regular 35 unit/ Total Parenteral 

Nutrition/Amino Acids/Dextrose/ Fat Emulsion Intravenous 1,400 ml @  58.333 mls/

hr TPN  CONT IV  Last administered on 4/24/20at 00:06;  Start 4/23/20 at 22:00; 

Stop 4/24/20 at 21:59;  Status DC


Alteplase, Recombinant (Cathflo For Central Catheter Clearance) 1 mg 1X  ONCE 

INT CAT  Last administered on 4/24/20at 11:44;  Start 4/24/20 at 10:45;  Stop 

4/24/20 at 10:46;  Status DC


Ondansetron HCl (Zofran) 4 mg PRN Q6HRS  PRN IV NAUSEA/VOMITING;  Start 4/27/20 

at 07:00;  Stop 4/28/20 at 06:59;  Status DC


Fentanyl Citrate (Fentanyl 2ml Vial) 25 mcg PRN Q5MIN  PRN IV MILD PAIN 1-3;  

Start 4/27/20 at 07:00;  Stop 4/28/20 at 06:59;  Status DC


Fentanyl Citrate (Fentanyl 2ml Vial) 50 mcg PRN Q5MIN  PRN IV MODERATE TO SEVERE

PAIN Last administered on 4/27/20at 10:17;  Start 4/27/20 at 07:00;  Stop 

4/28/20 at 06:59;  Status DC


Ringer's Solution 1,000 ml @  30 mls/hr Q24H IV ;  Start 4/27/20 at 07:00;  Stop

4/27/20 at 18:59;  Status DC


Lidocaine HCl (Xylocaine-Mpf 1% 2ml Vial) 2 ml PRN 1X  PRN ID PRIOR TO IV START;

 Start 4/27/20 at 07:00;  Stop 4/28/20 at 06:59;  Status DC


Prochlorperazine Edisylate (Compazine) 5 mg PACU PRN  PRN IV NAUSEA, MRX1;  

Start 4/27/20 at 07:00;  Stop 4/28/20 at 06:59;  Status DC


Sodium Acetate 50 meq/Potassium Acetate 55 meq/ Magnesium Sulfate 20 meq/Calcium

Gluconate 10 meq/ Multivitamins 10 ml/Chromium/ Copper/Manganese/ Seleni/Zn 0.5 

ml/ Insulin Human Regular 35 unit/ Total Parenteral Nutrition/Amino 

Acids/Dextrose/ Fat Emulsion Intravenous 1,400 ml @  58.333 mls/ hr TPN  CONT IV

;  Start 4/24/20 at 22:00;  Stop 4/24/20 at 14:15;  Status DC


Sodium Acetate 50 meq/Potassium Acetate 55 meq/ Magnesium Sulfate 20 meq/Calcium

Gluconate 10 meq/ Multivitamins 10 ml/Chromium/ Copper/Manganese/ Seleni/Zn 0.5 

ml/ Insulin Human Regular 35 unit/ Total Parenteral Nutrition/Amino 

Acids/Dextrose/ Fat Emulsion Intravenous 1,800 ml @  75 mls/hr TPN  CONT IV  

Last administered on 4/24/20at 22:38;  Start 4/24/20 at 22:00;  Stop 4/25/20 at 

21:59;  Status DC


Sodium Chloride 1,000 ml @  1,000 mls/hr Q1H PRN IV hypotension;  Start 4/24/20 

at 15:31;  Stop 4/24/20 at 21:30;  Status DC


Diphenhydramine HCl (Benadryl) 25 mg 1X PRN  PRN IV ITCHING;  Start 4/24/20 at 

15:45;  Stop 4/25/20 at 15:44;  Status DC


Diphenhydramine HCl (Benadryl) 25 mg 1X PRN  PRN IV ITCHING;  Start 4/24/20 at 

15:45;  Stop 4/25/20 at 15:44;  Status DC


Sodium Chloride 1,000 ml @  400 mls/hr Q2H30M PRN IV PATENCY;  Start 4/24/20 at 

15:31;  Stop 4/25/20 at 03:30;  Status DC


Info (PHARMACY MONITORING -- do not chart) 1 each PRN DAILY  PRN MC SEE 

COMMENTS;  Start 4/24/20 at 15:45


Sodium Acetate 50 meq/Potassium Acetate 55 meq/ Magnesium Sulfate 20 meq/Calcium

Gluconate 10 meq/ Multivitamins 10 ml/Chromium/ Copper/Manganese/ Seleni/Zn 0.5 

ml/ Insulin Human Regular 35 unit/ Total Parenteral Nutrition/Amino 

Acids/Dextrose/ Fat Emulsion Intravenous 1,800 ml @  75 mls/hr TPN  CONT IV  

Last administered on 4/25/20at 22:03;  Start 4/25/20 at 22:00;  Stop 4/26/20 at 

21:59;  Status DC


Daptomycin 430 mg/ Sodium Chloride 50 ml @  100 mls/hr Q24H IV  Last admi

nistered on 4/30/20at 13:00;  Start 4/25/20 at 13:00;  Stop 4/30/20 at 20:58;  

Status DC


Heparin Sodium (Porcine) 1000 unit/Sodium Chloride 1,001 ml @  1,001 mls/hr 1X  

ONCE IRR ;  Start 4/27/20 at 06:00;  Stop 4/27/20 at 06:59;  Status DC


Potassium Acetate 55 meq/Magnesium Sulfate 20 meq/ Calcium Gluconate 10 meq/ 

Multivitamins 10 ml/Chromium/ Copper/Manganese/ Seleni/Zn 0.5 ml/ Insulin Human 

Regular 35 unit/ Total Parenteral Nutrition/Amino Acids/Dextrose/ Fat Emulsion 

Intravenous 1,920 ml @  80 mls/hr TPN  CONT IV  Last administered on 4/26/20at 

22:10;  Start 4/26/20 at 22:00;  Stop 4/27/20 at 21:59;  Status DC


Dexamethasone Sodium Phosphate (Decadron) 4 mg STK-MED ONCE .ROUTE ;  Start 

4/27/20 at 10:56;  Stop 4/27/20 at 10:57;  Status DC


Ondansetron HCl (Zofran) 4 mg STK-MED ONCE .ROUTE ;  Start 4/27/20 at 10:56;  

Stop 4/27/20 at 10:57;  Status DC


Rocuronium Bromide (Zemuron) 50 mg STK-MED ONCE .ROUTE ;  Start 4/27/20 at 

10:56;  Stop 4/27/20 at 10:57;  Status DC


Fentanyl Citrate (Fentanyl 2ml Vial) 100 mcg STK-MED ONCE .ROUTE ;  Start 

4/27/20 at 10:56;  Stop 4/27/20 at 10:57;  Status DC


Bupivacaine HCl/ Epinephrine Bitart (Sensorcain-Epi 0.5%-1:149333 Mpf) 30 ml 

STK-MED ONCE .ROUTE  Last administered on 4/27/20at 12:01;  Start 4/27/20 at 

10:58;  Stop 4/27/20 at 10:58;  Status DC


Cellulose (Surgicel Hemostat 2x14) 1 each STK-MED ONCE .ROUTE ;  Start 4/27/20 

at 10:58;  Stop 4/27/20 at 10:59;  Status DC


Iohexol (Omnipaque 300 Mg/ml) 50 ml STK-MED ONCE .ROUTE ;  Start 4/27/20 at 

10:58;  Stop 4/27/20 at 10:59;  Status DC


Cellulose (Surgicel Hemostat 4x8) 1 each STK-MED ONCE .ROUTE ;  Start 4/27/20 at

10:58;  Stop 4/27/20 at 10:59;  Status DC


Bisacodyl (Dulcolax Supp) 10 mg STK-MED ONCE .ROUTE ;  Start 4/27/20 at 10:59;  

Stop 4/27/20 at 10:59;  Status DC


Heparin Sodium (Porcine) 1000 unit/Sodium Chloride 1,001 ml @  1,001 mls/hr 1X  

ONCE IRR ;  Start 4/27/20 at 12:00;  Stop 4/27/20 at 12:59;  Status DC


Propofol 20 ml @ As Directed STK-MED ONCE IV ;  Start 4/27/20 at 11:05;  Stop 

4/27/20 at 11:05;  Status DC


Sevoflurane (Ultane) 90 ml STK-MED ONCE IH ;  Start 4/27/20 at 11:05;  Stop 

4/27/20 at 11:05;  Status DC


Sevoflurane (Ultane) 60 ml STK-MED ONCE IH ;  Start 4/27/20 at 12:26;  Stop 

4/27/20 at 12:27;  Status DC


Propofol 20 ml @ As Directed STK-MED ONCE IV ;  Start 4/27/20 at 12:26;  Stop 

4/27/20 at 12:27;  Status DC


Phenylephrine HCl (PHENYLEPHRINE in 0.9% NACL PF) 1 mg STK-MED ONCE IV ;  Start 

4/27/20 at 12:34;  Stop 4/27/20 at 12:34;  Status DC


Heparin Sodium (Porcine) (Heparin Sodium) 5,000 unit Q12HR SQ  Last administered

on 5/6/20at 20:57;  Start 4/27/20 at 21:00;  Stop 5/7/20 at 09:59;  Status DC


Sodium Chloride (Normal Saline Flush) 3 ml QSHIFT  PRN IV AFTER MEDS AND BLOOD 

DRAWS;  Start 4/27/20 at 13:45


Naloxone HCl (Narcan) 0.4 mg PRN Q2MIN  PRN IV SEE INSTRUCTIONS;  Start 4/27/20 

at 13:45


Sodium Chloride 1,000 ml @  25 mls/hr Q24H IV  Last administered on 5/7/20at 

13:56;  Start 4/27/20 at 13:37


Naloxone HCl (Narcan) 0.4 mg PRN Q2MIN  PRN IV SEE INSTRUCTIONS;  Start 4/27/20 

at 14:30;  Status UNV


Sodium Chloride 1,000 ml @  25 mls/hr Q24H IV ;  Start 4/27/20 at 14:30;  Status

UNV


Hydromorphone HCl 30 ml @ 0 mls/hr CONT PRN  PRN IV PER PROTOCOL Last 

administered on 5/2/20at 16:08;  Start 4/27/20 at 14:30;  Stop 5/4/20 at 08:55; 

Status DC


Potassium Acetate 55 meq/Magnesium Sulfate 20 meq/ Calcium Gluconate 10 meq/ 

Multivitamins 10 ml/Chromium/ Copper/Manganese/ Seleni/Zn 0.5 ml/ Insulin Human 

Regular 35 unit/ Total Parenteral Nutrition/Amino Acids/Dextrose/ Fat Emulsion 

Intravenous 1,920 ml @  80 mls/hr TPN  CONT IV  Last administered on 4/27/20at 

22:01;  Start 4/27/20 at 22:00;  Stop 4/28/20 at 21:59;  Status DC


Bumetanide (Bumex) 2 mg [id number] IV  Last administered on 5/1/20at 13:50;  Start 

4/28/20 at 14:00;  Stop 5/2/20 at 14:10;  Status DC


Meropenem 1 gm/ Sodium Chloride 100 ml @  200 mls/hr Q8HRS IV  Last administered

on 5/7/20at 13:57;  Start 4/28/20 at 14:00


Potassium Acetate 55 meq/Magnesium Sulfate 20 meq/ Calcium Gluconate 10 meq/ 

Multivitamins 10 ml/Chromium/ Copper/Manganese/ Seleni/Zn 0.5 ml/ Insulin Human 

Regular 35 unit/ Total Parenteral Nutrition/Amino Acids/Dextrose/ Fat Emulsion 

Intravenous 1,920 ml @  80 mls/hr TPN  CONT IV  Last administered on 4/28/20at 

22:02;  Start 4/28/20 at 22:00;  Stop 4/29/20 at 21:59;  Status DC


Hydromorphone HCl (Dilaudid Standard PCA) 12 mg STK-MED ONCE IV ;  Start 4/27/20

at 14:35;  Stop 4/28/20 at 13:53;  Status DC


Artificial Tears (Artificial Tears) 1 drop PRN Q15MIN  PRN OU DRY EYE Last 

administered on 5/5/20at 09:10;  Start 4/29/20 at 05:30


Hydromorphone HCl (Dilaudid Standard PCA) 12 mg STK-MED ONCE IV ;  Start 4/28/20

at 12:05;  Stop 4/29/20 at 09:15;  Status DC


Potassium Acetate 65 meq/Magnesium Sulfate 20 meq/ Calcium Gluconate 10 meq/ 

Multivitamins 10 ml/Chromium/ Copper/Manganese/ Seleni/Zn 0.5 ml/ Insulin Human 

Regular 30 unit/ Total Parenteral Nutrition/Amino Acids/Dextrose/ Fat Emulsion 

Intravenous 1,920 ml @  80 mls/hr TPN  CONT IV  Last administered on 4/29/20at 

22:22;  Start 4/29/20 at 22:00;  Stop 4/30/20 at 21:59;  Status DC


Cyclobenzaprine HCl (Flexeril) 10 mg PRN Q6HRS  PRN PO MUSCLE SPASMS;  Start 

4/30/20 at 10:45


Potassium Acetate 55 meq/Magnesium Sulfate 20 meq/ Calcium Gluconate 10 meq/ 

Multivitamins 10 ml/Chromium/ Copper/Manganese/ Seleni/Zn 0.5 ml/ Insulin Human 

Regular 30 unit/ Total Parenteral Nutrition/Amino Acids/Dextrose/ Fat Emulsion 

Intravenous 1,920 ml @  80 mls/hr TPN  CONT IV  Last administered on 5/1/20at 

01:00;  Start 4/30/20 at 22:00;  Stop 5/1/20 at 21:59;  Status DC


Magnesium Sulfate 50 ml @ 25 mls/hr 1X  ONCE IV  Last administered on 4/30/20at 

17:18;  Start 4/30/20 at 12:45;  Stop 4/30/20 at 14:44;  Status DC


Potassium Chloride/Water 100 ml @  100 mls/hr 1X  ONCE IV  Last administered on 

5/1/20at 11:27;  Start 5/1/20 at 12:00;  Stop 5/1/20 at 12:59;  Status DC


Hydromorphone HCl (Dilaudid Standard PCA) 12 mg STK-MED ONCE IV ;  Start 4/29/20

at 10:50;  Stop 5/1/20 at 11:02;  Status DC


Hydromorphone HCl (Dilaudid Standard PCA) 12 mg STK-MED ONCE IV ;  Start 4/30/20

at 13:47;  Stop 5/1/20 at 11:03;  Status DC


Potassium Acetate 30 meq/Magnesium Sulfate 20 meq/ Calcium Gluconate 10 meq/ 

Multivitamins 10 ml/Chromium/ Copper/Manganese/ Seleni/Zn 0.5 ml/ Insulin Human 

Regular 30 unit/ Potassium Chloride 30 meq/ Total Parenteral Nutrition/Amino 

Acids/Dextrose/ Fat Emulsion Intravenous 1,920 ml @  80 mls/hr TPN  CONT IV  

Last administered on 5/1/20at 22:34;  Start 5/1/20 at 22:00;  Stop 5/2/20 at 

21:59;  Status DC


Potassium Chloride/Water 100 ml @  100 mls/hr Q1H IV  Last administered on 

5/2/20at 13:05;  Start 5/2/20 at 07:00;  Stop 5/2/20 at 10:59;  Status DC


Magnesium Sulfate 50 ml @ 25 mls/hr 1X  ONCE IV  Last administered on 5/2/20at 

10:34;  Start 5/2/20 at 10:30;  Stop 5/2/20 at 12:29;  Status DC


Potassium Chloride 75 meq/ Magnesium Sulfate 20 meq/Calcium Gluconate 10 meq/ 

Multivitamins 10 ml/Chromium/ Copper/Manganese/ Seleni/Zn 0.5 ml/ Insulin Human 

Regular 30 unit/ Total Parenteral Nutrition/Amino Acids/Dextrose/ Fat Emulsion 

Intravenous 1,920 ml @  80 mls/hr TPN  CONT IV  Last administered on 5/2/20at 

21:51;  Start 5/2/20 at 22:00;  Stop 5/3/20 at 22:00;  Status DC


Potassium Chloride 75 meq/ Magnesium Sulfate 20 meq/Calcium Gluconate 10 meq/ 

Multivitamins 10 ml/Chromium/ Copper/Manganese/ Seleni/Zn 0.5 ml/ Insulin Human 

Regular 25 unit/ Total Parenteral Nutrition/Amino Acids/Dextrose/ Fat Emulsion 

Intravenous 1,920 ml @  80 mls/hr TPN  CONT IV  Last administered on 5/3/20at 

22:04;  Start 5/3/20 at 22:00;  Stop 5/4/20 at 21:59;  Status DC


Hydromorphone HCl (Dilaudid) 0.4 mg PRN Q4HRS  PRN IVP PAIN Last administered on

5/4/20at 10:57;  Start 5/4/20 at 09:00;  Stop 5/4/20 at 18:59;  Status DC


Micafungin Sodium 100 mg/Dextrose 100 ml @  100 mls/hr Q24H IV  Last 

administered on 5/7/20at 12:11;  Start 5/4/20 at 11:00


Daptomycin 485 mg/ Sodium Chloride 50 ml @  100 mls/hr Q24H IV  Last 

administered on 5/7/20at 11:29;  Start 5/4/20 at 11:00


Potassium Chloride 75 meq/ Magnesium Sulfate 15 meq/Calcium Gluconate 8 meq/ 

Multivitamins 10 ml/Chromium/ Copper/Manganese/ Seleni/Zn 0.5 ml/ Insulin Human 

Regular 25 unit/ Total Parenteral Nutrition/Amino Acids/Dextrose/ Fat Emulsion 

Intravenous 1,920 ml @  80 mls/hr TPN  CONT IV  Last administered on 5/4/20at 

23:08;  Start 5/4/20 at 22:00;  Stop 5/5/20 at 21:59;  Status DC


Haloperidol Lactate (Haldol Inj) 3 mg 1X  ONCE IVP  Last administered on 

5/4/20at 14:37;  Start 5/4/20 at 14:30;  Stop 5/4/20 at 14:31;  Status DC


Hydromorphone HCl (Dilaudid) 1 mg PRN Q4HRS  PRN IVP PAIN Last administered on 

5/7/20at 08:39;  Start 5/4/20 at 19:00


Potassium Chloride 75 meq/ Magnesium Sulfate 15 meq/Calcium Gluconate 8 meq/ 

Multivitamins 10 ml/Chromium/ Copper/Manganese/ Seleni/Zn 0.5 ml/ Insulin Human 

Regular 20 unit/ Total Parenteral Nutrition/Amino Acids/Dextrose/ Fat Emulsion 

Intravenous 1,920 ml @  80 mls/hr TPN  CONT IV  Last administered on 5/5/20at 

22:10;  Start 5/5/20 at 22:00;  Stop 5/6/20 at 21:59;  Status DC


Lidocaine HCl (Buffered Lidocaine 1%) 3 ml STK-MED ONCE .ROUTE ;  Start 5/6/20 

at 11:31;  Stop 5/6/20 at 11:31;  Status DC


Lidocaine HCl (Buffered Lidocaine 1%) 3 ml STK-MED ONCE .ROUTE ;  Start 5/6/20 

at 12:28;  Stop 5/6/20 at 12:29;  Status DC


Lidocaine HCl (Buffered Lidocaine 1%) 6 ml 1X  ONCE INJ  Last administered on 

5/6/20at 12:53;  Start 5/6/20 at 12:45;  Stop 5/6/20 at 12:46;  Status DC


Potassium Chloride 75 meq/ Magnesium Sulfate 15 meq/Calcium Gluconate 8 meq/ 

Multivitamins 10 ml/Chromium/ Copper/Manganese/ Seleni/Zn 0.5 ml/ Insulin Human 

Regular 20 unit/ Total Parenteral Nutrition/Amino Acids/Dextrose/ Fat Emulsion 

Intravenous 1,920 ml @  80 mls/hr TPN  CONT IV  Last administered on 5/6/20at 

22:00;  Start 5/6/20 at 22:00;  Stop 5/7/20 at 21:59


Potassium Chloride 75 meq/ Magnesium Sulfate 15 meq/Calcium Gluconate 8 meq/ 

Multivitamins 10 ml/Chromium/ Copper/Manganese/ Seleni/Zn 0.5 ml/ Insulin Human 

Regular 15 unit/ Total Parenteral Nutrition/Amino Acids/Dextrose/ Fat Emulsion 

Intravenous 1,920 ml @  80 mls/hr TPN  CONT IV ;  Start 5/7/20 at 22:00;  Stop 

5/8/20 at 21:59





Active Scripts


Active


Reported


Bisoprolol Fumarate 5 Mg Tablet 10 Mg PO DAILY


Vitals/I & O





Vital Sign - Last 24 Hours








 5/6/20 5/6/20 5/6/20 5/6/20





 15:00 15:32 15:57 16:00


 


Temp    100.1





    100.1


 


Pulse 92   94


 


Resp 29   26


 


B/P (MAP) 110/65 (80)   96/55 (69)


 


Pulse Ox 99  100 99


 


O2 Delivery Ventilator Mechanical Ventilator Ventilator Ventilator


 


    





    





 5/6/20 5/6/20 5/6/20 5/6/20





 17:00 18:00 19:00 19:40


 


Temp   100.2 





   100.2 


 


Pulse 92 96 90 


 


Resp 33 34 32 32


 


B/P (MAP) 105/61 (76) 128/76 (93) 112/58 (76) 


 


Pulse Ox 97 98 97 96


 


O2 Delivery Ventilator Ventilator Tracheal Collar Tracheal Collar


 


O2 Flow Rate    30.0


 


    





    





 5/6/20 5/6/20 5/6/20 5/6/20





 20:10 20:24 20:27 21:19


 


Pulse   92 


 


Resp 32  34 


 


B/P (MAP)   119/61 (80) 


 


Pulse Ox 98  97 100


 


O2 Delivery  Mechanical Ventilator Tracheal Collar Ventilator


 


O2 Flow Rate  30.0  





 5/6/20 5/6/20 5/6/20 5/6/20





 21:25 21:29 22:43 23:08


 


Temp    98.9





    98.9


 


Pulse  100 76 76


 


Resp  26 20 24


 


B/P (MAP)  121/67 (85) 87/52 (64) 84/48 (60)


 


Pulse Ox 98 99 98 100


 


O2 Delivery Ventilator assist control assist control assist control vent


 


    





    





 5/7/20 5/7/20 5/7/20 5/7/20





 00:00 00:00 00:00 01:14


 


Pulse 99   81


 


Resp 24   24


 


B/P (MAP) 96/60 (72)   104/58 (73)


 


Pulse Ox 100  98 97


 


O2 Delivery assist control vent Mechanical Ventilator Ventilator assist control 

vent


 


O2 Flow Rate  30.0  





 5/7/20 5/7/20 5/7/20 5/7/20





 02:12 02:25 02:55 03:00


 


Pulse 100   80


 


Resp 24 24 24 20


 


B/P (MAP) 179/95 (123)   101/56 (71)


 


Pulse Ox 100 98 95 97


 


O2 Delivery assist control vent assist control vent  assist control vent





 5/7/20 5/7/20 5/7/20 5/7/20





 04:00 04:00 04:51 05:00


 


Temp  98.6  





  98.6  


 


Pulse  79  76


 


Resp  24  24


 


B/P (MAP)  95/60 (72)  95/56 (69)


 


Pulse Ox  97 97 97


 


O2 Delivery Mechanical Ventilator assist control Ventilator ASSIST CONTROL VENT


 


O2 Flow Rate 30.0   


 


    





    





 5/7/20 5/7/20 5/7/20 5/7/20





 06:09 07:00 07:11 08:00


 


Temp    99.3





    99.3


 


Pulse 77 82  90


 


Resp 20 32  18


 


B/P (MAP) 97/51 (66) 116/65 (82)  127/62 (83)


 


Pulse Ox 97 97 98 96


 


O2 Delivery ASSIST CONTROL VENT Ventilator Ventilator Ventilator


 


    





    





 5/7/20 5/7/20 5/7/20 5/7/20





 08:00 08:39 09:00 09:36


 


Pulse   78 


 


Resp  18 18 


 


B/P (MAP)   101/58 (72) 


 


Pulse Ox  98 97 97


 


O2 Delivery Mechanical Ventilator Ventilator Ventilator Ventilator





 5/7/20 5/7/20 5/7/20 5/7/20





 09:41 09:50 10:00 11:00


 


Pulse   82 94


 


Resp   25 36


 


B/P (MAP)   117/73 (88) 150/81 (104)


 


Pulse Ox  97 97 98


 


O2 Delivery Ventilator Ventilator Ventilator Tracheal Collar


 


O2 Flow Rate    8.0





 5/7/20 5/7/20 5/7/20 5/7/20





 11:10 11:57 12:00 12:00


 


Temp   99.8 





   99.8 


 


Pulse   102 


 


Resp   32 


 


B/P (MAP)   115/60 (78) 


 


Pulse Ox 98 97 97 


 


O2 Delivery Ventilator Tracheal Collar Tracheal Collar Trach Collar


 


O2 Flow Rate  8.0 8.0 8.0


 


    





    





 5/7/20 5/7/20  





 13:00 14:00  


 


Pulse 97 94  


 


Resp 36 34  


 


B/P (MAP) 132/72 (92) 134/72 (92)  


 


Pulse Ox 98 98  


 


O2 Delivery Tracheal Collar Tracheal Collar  


 


O2 Flow Rate 8.0 8.0  














Intake and Output 0  


 


 5/6/20 5/6/20 5/7/20





 14:59 22:59 06:59


 


Intake Total 1000 ml 1284.05 ml 169 ml


 


Output Total 880 ml 430 ml 825 ml


 


Balance 120 ml 854.05 ml -656 ml











Hemodynamically unstable?:  No


Is patient in severe pain?:  Yes


Is NPO status required?:  Yes











HIRAM BARRERA MD              May 7, 2020 14:56

## 2020-05-07 NOTE — PDOC
Subjective:


Subjective:


Pretty drowsy after Dilaudid.





Objective:


Objective:


D/w nurse - discussed vomiting w/ Dr. Flores, plans to retry NG placement.


Vital Signs:





                                   Vital Signs








  Date Time  Temp Pulse Resp B/P (MAP) Pulse Ox O2 Delivery O2 Flow Rate FiO2


 


5/7/20 09:50     97 Ventilator  


 


5/7/20 08:39   18     


 


5/7/20 07:00  82  116/65 (82)    


 


5/7/20 04:00 98.6       





 98.6       


 


5/7/20 04:00       30.0 








Labs:





Laboratory Tests








Test


 5/6/20


13:00 5/6/20


17:30 5/7/20


00:09 5/7/20


05:00


 


Glucose (Fingerstick)


 156 mg/dL


(70-99) 108 mg/dL


(70-99) 100 mg/dL


(70-99) 139 mg/dL


(70-99)








  BLOOD CULTURE  Preliminary  


        NO GROWTH AFTER 2 DAYS


Imaging:


Paracentesis 5/6


0.4 L of opaque, debris-containing ascites





CXR 5/7


IMPRESSION: 


1. Stable diffuse infiltrate and small moderate pleural effusions.


2. Right PICC with the tip in the superior right atrium.





KUB 5/7


pending





PE:





GEN: NAD


LUNGS: trach


HEART: RRR


ABD: distended


NEURO/PSYCH: briefly opens eyes





A/P:


Gallstone pancreatitis, MOSF


Vomiting


Anemia - stable


Hypernatremia





--


Trying NG placement again.





Hemodynamically unstable?:  No


Is patient in severe pain?:  Yes


Is NPO status required?:  Yes











RAGHAVENDRA MURCIA          May 7, 2020 10:40

## 2020-05-07 NOTE — NUR
Pharmacy TPN Dosing Note



S: JESENIA RICH is a 49 year old F Currently receiving Central Continuous TPN started 
03/18/20



B:Pertinent PMH: Necrotizing pancreatitis

Height: 5 feet, 8 inches

Weight: 107.0 kg



Current diet: NPO 



LABS:

Sodium:    152 

Potassium: 4.2 

Chloride:  116 

Calcium:   8.4 

Corrected Calcium: 10.16 

Magnesium: 2 

CO2:       31 

SCr:       1 

Glucose:   108-150 

Albumin:   1.8 

AST:       35 

ALT:       30 



TPN FORMULA:

TPN TYPE:  Central Continuous

AMINO ACIDS:         90 gm

DEXTROSE:            225 gm

LIPIDS:              30 gm



POTASSIUM CHLORIDE:  75 mEq

MAGNESIUM:           15 mEq

CALCIUM:             8 mEq

INSULIN:             15 units

MULTIPLE VITAMIN:    10 ml

TRACE ELEMENTS:      0.5 ml(s)



TPN PLAN:  

Insulin reduced to 15 units/bag of TPN





R: Change TPN per plan and ordered formula

Will monitor electrolytes, glucose, and tolerance to TPN.



 Maribell Vazquez RPH, 05/07/20 [id number]

## 2020-05-07 NOTE — PDOC
PULMONARY PROGRESS NOTES


Subjective


Patient intubated on 3/23 , s/p trach 4/6, awake alert follows commands





Patient off of mechanical support on trach shield


Vitals





Vital Signs








  Date Time  Temp Pulse Resp B/P (MAP) Pulse Ox O2 Delivery O2 Flow Rate FiO2


 


5/7/20 07:11     98 Ventilator  


 


5/7/20 07:00  82 32 116/65 (82)    


 


5/7/20 04:00 98.6       





 98.6       


 


5/7/20 04:00       30.0 








ROS:  No Nausea, No Chest Pain, No Abdominal Pain, No Increase Cough


General:  Alert, No acute distress


HEENT:  Other (nc at perrl   nose clear  nech  trach site ok  no lad   no 

thyromegaly)


Lungs:  Crackles


Cardiovascular:  S1, S2


Abdomen:  Soft, Non-tender, Other (firm)


Neuro Exam:  Alert


Extremities:  Other (+3 generalized edema )


Skin:  Warm, Dry


Labs





Laboratory Tests








Test


 5/5/20


12:18 5/5/20


18:13 5/6/20


00:09 5/6/20


06:05


 


Glucose (Fingerstick)


 147 mg/dL


(70-99) 143 mg/dL


(70-99) 151 mg/dL


(70-99) 





 


White Blood Count


 


 


 


 8.8 x10^3/uL


(4.0-11.0)


 


Red Blood Count


 


 


 


 2.30 x10^6/uL


(3.50-5.40)


 


Hemoglobin


 


 


 


 6.8 g/dL


(12.0-15.5)


 


Hematocrit


 


 


 


 20.9 %


(36.0-47.0)


 


Mean Corpuscular Volume    91 fL () 


 


Mean Corpuscular Hemoglobin    29 pg (25-35) 


 


Mean Corpuscular Hemoglobin


Concent 


 


 


 32 g/dL


(31-37)


 


Red Cell Distribution Width


 


 


 


 18.8 %


(11.5-14.5)


 


Platelet Count


 


 


 


 416 x10^3/uL


(140-400)


 


Neutrophils (%) (Auto)    70 % (31-73) 


 


Lymphocytes (%) (Auto)    21 % (24-48) 


 


Monocytes (%) (Auto)    6 % (0-9) 


 


Eosinophils (%) (Auto)    3 % (0-3) 


 


Basophils (%) (Auto)    0 % (0-3) 


 


Neutrophils # (Auto)


 


 


 


 6.1 x10^3/uL


(1.8-7.7)


 


Lymphocytes # (Auto)


 


 


 


 1.9 x10^3/uL


(1.0-4.8)


 


Monocytes # (Auto)


 


 


 


 0.5 x10^3/uL


(0.0-1.1)


 


Eosinophils # (Auto)


 


 


 


 0.2 x10^3/uL


(0.0-0.7)


 


Basophils # (Auto)


 


 


 


 0.0 x10^3/uL


(0.0-0.2)


 


Sodium Level


 


 


 


 152 mmol/L


(136-145)


 


Potassium Level


 


 


 


 4.3 mmol/L


(3.5-5.1)


 


Chloride Level


 


 


 


 113 mmol/L


()


 


Carbon Dioxide Level


 


 


 


 31 mmol/L


(21-32)


 


Anion Gap    8 (6-14) 


 


Blood Urea Nitrogen


 


 


 


 38 mg/dL


(7-20)


 


Creatinine


 


 


 


 0.9 mg/dL


(0.6-1.0)


 


Estimated GFR


(Cockcroft-Gault) 


 


 


 66.5 





 


BUN/Creatinine Ratio    42 (6-20) 


 


Glucose Level


 


 


 


 137 mg/dL


(70-99)


 


Calcium Level


 


 


 


 9.0 mg/dL


(8.5-10.1)


 


Total Bilirubin


 


 


 


 0.4 mg/dL


(0.2-1.0)


 


Aspartate Amino Transf


(AST/SGOT) 


 


 


 35 U/L (15-37) 





 


Alanine Aminotransferase


(ALT/SGPT) 


 


 


 30 U/L (14-59) 





 


Alkaline Phosphatase


 


 


 


 84 U/L


()


 


Total Protein


 


 


 


 5.6 g/dL


(6.4-8.2)


 


Albumin


 


 


 


 1.8 g/dL


(3.4-5.0)


 


Albumin/Globulin Ratio    0.5 (1.0-1.7) 


 


Test


 5/6/20


06:09 5/6/20


08:20 5/6/20


13:00 5/6/20


16:40


 


Glucose (Fingerstick)


 128 mg/dL


(70-99) 


 156 mg/dL


(70-99) 





 


Hemoglobin


 


 6.4 g/dL


(12.0-15.5) 


 7.8 g/dL


(12.0-15.5)


 


Hematocrit


 


 20.1 %


(36.0-47.0) 


 24.1 %


(36.0-47.0)


 


Mean Corpuscular Hemoglobin


Concent 


 32 g/dL


(31-37) 


 32 g/dL


(31-37)


 


Test


 5/6/20


17:30 5/7/20


00:09 5/7/20


05:00 





 


Glucose (Fingerstick)


 108 mg/dL


(70-99) 100 mg/dL


(70-99) 139 mg/dL


(70-99) 





 


White Blood Count


 


 


 9.1 x10^3/uL


(4.0-11.0) 





 


Red Blood Count


 


 


 2.44 x10^6/uL


(3.50-5.40) 





 


Hemoglobin


 


 


 7.1 g/dL


(12.0-15.5) 





 


Hematocrit


 


 


 21.9 %


(36.0-47.0) 





 


Mean Corpuscular Volume   90 fL ()  


 


Mean Corpuscular Hemoglobin   29 pg (25-35)  


 


Mean Corpuscular Hemoglobin


Concent 


 


 33 g/dL


(31-37) 





 


Red Cell Distribution Width


 


 


 17.5 %


(11.5-14.5) 





 


Platelet Count


 


 


 382 x10^3/uL


(140-400) 





 


Neutrophils (%) (Auto)   73 % (31-73)  


 


Lymphocytes (%) (Auto)   21 % (24-48)  


 


Monocytes (%) (Auto)   5 % (0-9)  


 


Eosinophils (%) (Auto)   1 % (0-3)  


 


Basophils (%) (Auto)   0 % (0-3)  


 


Neutrophils # (Auto)


 


 


 6.6 x10^3/uL


(1.8-7.7) 





 


Lymphocytes # (Auto)


 


 


 1.9 x10^3/uL


(1.0-4.8) 





 


Monocytes # (Auto)


 


 


 0.5 x10^3/uL


(0.0-1.1) 





 


Eosinophils # (Auto)


 


 


 0.1 x10^3/uL


(0.0-0.7) 





 


Basophils # (Auto)


 


 


 0.0 x10^3/uL


(0.0-0.2) 





 


Sodium Level


 


 


 152 mmol/L


(136-145) 





 


Potassium Level


 


 


 4.2 mmol/L


(3.5-5.1) 





 


Chloride Level


 


 


 116 mmol/L


() 





 


Carbon Dioxide Level


 


 


 31 mmol/L


(21-32) 





 


Anion Gap   5 (6-14)  


 


Blood Urea Nitrogen


 


 


 39 mg/dL


(7-20) 





 


Creatinine


 


 


 1.0 mg/dL


(0.6-1.0) 





 


Estimated GFR


(Cockcroft-Gault) 


 


 58.9 


 





 


Glucose Level


 


 


 153 mg/dL


(70-99) 





 


Calcium Level


 


 


 8.4 mg/dL


(8.5-10.1) 





 


Phosphorus Level


 


 


 3.3 mg/dL


(2.6-4.7) 





 


Magnesium Level


 


 


 2.0 mg/dL


(1.8-2.4) 











Laboratory Tests








Test


 5/6/20


13:00 5/6/20


16:40 5/6/20


17:30 5/7/20


00:09


 


Glucose (Fingerstick)


 156 mg/dL


(70-99) 


 108 mg/dL


(70-99) 100 mg/dL


(70-99)


 


Hemoglobin


 


 7.8 g/dL


(12.0-15.5) 


 





 


Hematocrit


 


 24.1 %


(36.0-47.0) 


 





 


Mean Corpuscular Hemoglobin


Concent 


 32 g/dL


(31-37) 


 





 


Test


 5/7/20


05:00 


 


 





 


White Blood Count


 9.1 x10^3/uL


(4.0-11.0) 


 


 





 


Red Blood Count


 2.44 x10^6/uL


(3.50-5.40) 


 


 





 


Hemoglobin


 7.1 g/dL


(12.0-15.5) 


 


 





 


Hematocrit


 21.9 %


(36.0-47.0) 


 


 





 


Mean Corpuscular Volume 90 fL ()    


 


Mean Corpuscular Hemoglobin 29 pg (25-35)    


 


Mean Corpuscular Hemoglobin


Concent 33 g/dL


(31-37) 


 


 





 


Red Cell Distribution Width


 17.5 %


(11.5-14.5) 


 


 





 


Platelet Count


 382 x10^3/uL


(140-400) 


 


 





 


Neutrophils (%) (Auto) 73 % (31-73)    


 


Lymphocytes (%) (Auto) 21 % (24-48)    


 


Monocytes (%) (Auto) 5 % (0-9)    


 


Eosinophils (%) (Auto) 1 % (0-3)    


 


Basophils (%) (Auto) 0 % (0-3)    


 


Neutrophils # (Auto)


 6.6 x10^3/uL


(1.8-7.7) 


 


 





 


Lymphocytes # (Auto)


 1.9 x10^3/uL


(1.0-4.8) 


 


 





 


Monocytes # (Auto)


 0.5 x10^3/uL


(0.0-1.1) 


 


 





 


Eosinophils # (Auto)


 0.1 x10^3/uL


(0.0-0.7) 


 


 





 


Basophils # (Auto)


 0.0 x10^3/uL


(0.0-0.2) 


 


 





 


Sodium Level


 152 mmol/L


(136-145) 


 


 





 


Potassium Level


 4.2 mmol/L


(3.5-5.1) 


 


 





 


Chloride Level


 116 mmol/L


() 


 


 





 


Carbon Dioxide Level


 31 mmol/L


(21-32) 


 


 





 


Anion Gap 5 (6-14)    


 


Blood Urea Nitrogen


 39 mg/dL


(7-20) 


 


 





 


Creatinine


 1.0 mg/dL


(0.6-1.0) 


 


 





 


Estimated GFR


(Cockcroft-Gault) 58.9 


 


 


 





 


Glucose Level


 153 mg/dL


(70-99) 


 


 





 


Glucose (Fingerstick)


 139 mg/dL


(70-99) 


 


 





 


Calcium Level


 8.4 mg/dL


(8.5-10.1) 


 


 





 


Phosphorus Level


 3.3 mg/dL


(2.6-4.7) 


 


 





 


Magnesium Level


 2.0 mg/dL


(1.8-2.4) 


 


 











Medications





Active Scripts








 Medications  Dose


 Route/Sig


 Max Daily Dose Days Date Category


 


 Bisoprolol


 Fumarate 5 Mg


 Tablet  10 Mg


 PO DAILY


   3/16/20 Reported











Impression


.


IMPRESSION:


1.  Acute hypoxemic respiratory failure secondary to ARDS status post trach,


2.  Gallstone pancreatitis


3.  Severe metabolic acidosis.stable


4.  Acute kidney injury-stable, ON HD-- continue to improve 


5.  Acute gallstone pancreatitis.


6.  Hypoalbuminemia.


7.  Moderate persistent effusions


8.  Fever-  Per ID, per surgery


9.  Chronic anemia


10. Covid 19 testing negative


11. Moderate to large ascites-S/P paracentisis


12.S/P paracentisis with 4 liters removed on 4/15/20














Surgery note


Operative Note:


After obtaining informed consent, patient was taken to OR, induced under GETA 

and prepped in the usual fashion.  5 mm port placed umbilical and right mid 

abdomen, all under laparoscopic guidance.  Large amount of ascites encountered 

and aspirated off.  Fluid was clear with some whitish debris.  Viscera was 

completely locked in with obliteration of all planes, preventing any significant

exploration.  Copious irrigation.





Given patient's overall clinical improvement, favor against open procedure and 

attempt at cholecystectomy and/or necrosectomy, given high risk of 

complications.  Cholecystectomy will need to be performed, but favor waiting 3 

months.





19 ROBERT drain placed and secured with 3 0 nylon.  Skin repaired with 4 0 monocryl.

 Dressing placed.





Patient tolerated procedure well and sent to PACU in stable condition.  All 

counts correct.  Wound class is 4.





Plan


.


Continue trach shield up to chair, physical therapy.


Trach shield


precedex for anxiety, prn, avoid excessive sedation


Follow surgery input


Follow ID rec, abx per id


Follow nephrology recs 


Nutritional support per surgery: continue TPN for nutrition 


DVT/GI PPX : heparin Sq/ protonix


 


d/w RN/RT





CODE:FULL











STEVE MIRANDA MD               May 7, 2020 08:23

## 2020-05-07 NOTE — NUR
PT HAS HAD TO USE YONKUR TO SUCTION MODERATE AMOUNTS OF DARK GREEN EMESIS X 2.  BOTH ZOFRAN 
AND COMPAZINE GIVEN AT DIFFERENT TIMES TO TRY AND ALLEVIATE VOMITING.  PT IS NOW RESTING 
AFTER MEDICATION AND BOLUS OF PRECEDEX.

## 2020-05-07 NOTE — NUR
SS following up with discharge planning. SS discussed with pt RNFrandy. Per pt RN. Pt 
sitting up in chair. Pt had NG tube placed today. Pt having drains placed tomorrow. ID still 
following. Pt still needing IV antibiotics. Pt on TPN and NPO. Pt's daughters completing 
disability packet with [facility] today. SS will continue to follow for discharge planning.

## 2020-05-07 NOTE — PDOC
Infectious Disease Note


Subjective


Subjective





Has been vomiting and needs NGT


S/p Dilaudid and non responsive - Precedex


on trach with vent


Discussed with RN





VIJAY LINARES


unable to obtain





Vital Sign


Vital Signs





Vital Signs








  Date Time  Temp Pulse Resp B/P (MAP) Pulse Ox O2 Delivery O2 Flow Rate FiO2


 


5/7/20 08:39   18  98 Ventilator  


 


5/7/20 07:00  82  116/65 (82)    


 


5/7/20 04:00 98.6       





 98.6       


 


5/7/20 04:00       30.0 











Physical Exam


PHYSICAL EXAM


GENERAL: Propped up in bed. Nonresponsive s/p meds


HEENT:  oral cavity dry 


NECK:  Trach/vent 


LUNGS: rhonchi 


HEART:  S1, S2, regular 


ABDOMEN: Distended, hypoactive BS, drain placement (4/27 - serous fluid)


: [place] (4/14)


EXTREMITIES: Generalized edema, no cyanosis, SCDs bilaterally 


DERMATOLOGIC:  Warm and dry.  No generalized rash.  


CENTRAL NERVOUS SYSTEM: weak, mouthing words to simple questions 


PICC line in place April 30, 2020 clean


HDC has been removed


[facility] removed





Labs


Lab





Laboratory Tests








Test


 5/6/20


13:00 5/6/20


16:40 5/6/20


17:30 5/7/20


00:09


 


Glucose (Fingerstick)


 156 mg/dL


(70-99) 


 108 mg/dL


(70-99) 100 mg/dL


(70-99)


 


Hemoglobin


 


 7.8 g/dL


(12.0-15.5) 


 





 


Hematocrit


 


 24.1 %


(36.0-47.0) 


 





 


Mean Corpuscular Hemoglobin


Concent 


 32 g/dL


(31-37) 


 





 


Test


 5/7/20


05:00 


 


 





 


White Blood Count


 9.1 x10^3/uL


(4.0-11.0) 


 


 





 


Red Blood Count


 2.44 x10^6/uL


(3.50-5.40) 


 


 





 


Hemoglobin


 7.1 g/dL


(12.0-15.5) 


 


 





 


Hematocrit


 21.9 %


(36.0-47.0) 


 


 





 


Mean Corpuscular Volume 90 fL ()    


 


Mean Corpuscular Hemoglobin 29 pg (25-35)    


 


Mean Corpuscular Hemoglobin


Concent 33 g/dL


(31-37) 


 


 





 


Red Cell Distribution Width


 17.5 %


(11.5-14.5) 


 


 





 


Platelet Count


 382 x10^3/uL


(140-400) 


 


 





 


Neutrophils (%) (Auto) 73 % (31-73)    


 


Lymphocytes (%) (Auto) 21 % (24-48)    


 


Monocytes (%) (Auto) 5 % (0-9)    


 


Eosinophils (%) (Auto) 1 % (0-3)    


 


Basophils (%) (Auto) 0 % (0-3)    


 


Neutrophils # (Auto)


 6.6 x10^3/uL


(1.8-7.7) 


 


 





 


Lymphocytes # (Auto)


 1.9 x10^3/uL


(1.0-4.8) 


 


 





 


Monocytes # (Auto)


 0.5 x10^3/uL


(0.0-1.1) 


 


 





 


Eosinophils # (Auto)


 0.1 x10^3/uL


(0.0-0.7) 


 


 





 


Basophils # (Auto)


 0.0 x10^3/uL


(0.0-0.2) 


 


 





 


Sodium Level


 152 mmol/L


(136-145) 


 


 





 


Potassium Level


 4.2 mmol/L


(3.5-5.1) 


 


 





 


Chloride Level


 116 mmol/L


() 


 


 





 


Carbon Dioxide Level


 31 mmol/L


(21-32) 


 


 





 


Anion Gap 5 (6-14)    


 


Blood Urea Nitrogen


 39 mg/dL


(7-20) 


 


 





 


Creatinine


 1.0 mg/dL


(0.6-1.0) 


 


 





 


Estimated GFR


(Cockcroft-Gault) 58.9 


 


 


 





 


Glucose Level


 153 mg/dL


(70-99) 


 


 





 


Glucose (Fingerstick)


 139 mg/dL


(70-99) 


 


 





 


Calcium Level


 8.4 mg/dL


(8.5-10.1) 


 


 





 


Phosphorus Level


 3.3 mg/dL


(2.6-4.7) 


 


 





 


Magnesium Level


 2.0 mg/dL


(1.8-2.4) 


 


 











Micro


U/S 5/4





IMPRESSION: Complex fluid collections within the right and lower 


quadrants. The abdominal visceral and vascular structures are not formally


assessed on this exam.





CT Scan 5/4





IMPRESSION:


 





1. Findings consistent with sequelae of severe acute pancreatitis with 


enlarging peripancreatic and intra-abdominal fluid collections as 


described


 


2. Interval placement of a drain in the ventral abdominal wall with and 


partial evacuation of the ventral extraperitoneal fluid collection 


previously evident.











4/27


Operative Note:


After obtaining informed consent, patient was taken to OR, induced under GETA 

and prepped in the usual fashion.  5 mm port placed umbilical and right mid 

abdomen, all under laparoscopic guidance.  Large amount of ascites encountered 

and aspirated off.  Fluid was clear with some whitish debris.  Viscera was 

completely locked in with obliteration of all planes, preventing any significant

exploration.  Copious irrigation.





Given patient's overall clinical improvement, favor against open procedure and 

attempt at cholecystectomy and/or necrosectomy, given high risk of 

complications.  Cholecystectomy will need to be performed, but favor waiting 3 

months.





19 ROBERT drain placed and secured with 3 0 nylon.  Skin repaired with 4 0 monocryl.

 Dressing placed.





Patient tolerated procedure well and sent to PACU in stable condition.  All 

counts correct.  Wound class is 4.
































CT Chest Abd/pelv 4/14


CT CHEST:


CT scan the chest was done without contrast. There is a tracheostomy tube 


in good position. There are large bilateral pleural effusions. There is 


atelectasis in both lung bases. There is no mediastinal adenopathy. Right 


arm PICC line extends to the superior vena cava. There is a temporary 


dialysis catheter extending to the vena cava near the atrium.


 


IMPRESSION:


1. Large bilateral effusions.


2. Atelectasis of both lungs.


 


End impression


 


CT abdomen pelvis


 


CT scan the abdomen pelvis was done following CT chest with contrast. NG 


tube extends into the stomach. Spleen is normal. There is no mass or 


hydronephrosis in the kidneys. There is a gallstone the gallbladder. A 


focal liver lesion is not identified. There is diffuse necrosis of the 


pancreas. There is peripancreatic fluid. The pattern is similar to the 


recent study. There is fluid in the paracolic gutters. There is 


retroperitoneal fluid surrounding the kidneys. Ascites extends into the 


pelvis. Ascites is similar to the prior study. There is no bowel 


obstruction. There is no free air.


 


IMPRESSION:


1. Diffuse pancreatic necrosis.


2. A extensive retroperitoneal fluid and inflammation.


3. Cholelithiasis.


4. Moderate to large volume ascites with little change.



































CT A/P, 4/9


IMPRESSION:


1. Increased ascites.


2. 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


at the pancreas


3. Cholelithiasis with thickening of the gallbladder wall.


4. Persistent pleural effusions and atelectasis in the lung bases.





Objective


Assessment


Fever  - better currently - intermittent could be from underlying pancreatitis 

blood cults 5/4 - neg so far


? Ileus with vomiting


Abd distention - U/S and CT reviewed s/p 0.4 L of opaque, debris-containing 

ascites was removed 5/6


Acute pancreatitis with persistent  necrosis


CT a/p 4/9


    Increased ascites. Persistent evidence of necrotizing pancreatitis with 

fluid and phlegmon


   at the pancreas


   4/27 status post ROBERT drain placement - yeast on cult; Cult line tip 4/30 - neg


Anemia 


Cholelithiasis with thickening of the gallbladder wall.


Leucocytosis improving


JED,Hyperkalemia, Metabolic acidosis off dialysis


Acute hypoxic resp failure ,bilateral pleural effusion and atelectasis


hypocalcemia 


Prediabetes


HTN


s/p trach





Plan


Plan of Care





Appreciate IR for abd fluid drainage given fever and discomfort 5/6


NGT to be placed with vomiting today


Labs in am


F/u cults from 5/6 abd


Blood cult times 2 - 5/4 neg so far


Added Micafungin 5/4 with yeast from 4/27 abd cult will have it worked up and 

sensitivity


Dose Dapto 5/4





cont merrem (4/8) - try to wean soon but await cults 5/6


Electrolytes per primary 


Anemia per primary


Maintain aspiration precaution


PT and OT as tolerated


Continue supportive care





D/w nursing











RASHAWN ROSEN MD               May 7, 2020 08:50

## 2020-05-07 NOTE — PDOC
SURGICAL PROGRESS NOTE


Subjective


Pt with N/V, had NGT replaced, appears comfortable


Vital Signs





Vital Signs








  Date Time  Temp Pulse Resp B/P (MAP) Pulse Ox O2 Delivery O2 Flow Rate FiO2


 


5/7/20 12:00      Trach Collar 8.0 


 


5/7/20 11:57     97   


 


5/7/20 10:00  82 25 117/73 (88)    


 


5/7/20 08:00 99.3       





 99.3       








I&O











Intake and Output 


 


 5/7/20





 07:00


 


Intake Total 2453.05 ml


 


Output Total 2135 ml


 


Balance 318.05 ml


 


 


 


Intake Oral 0 ml


 


IV Total 1793.05 ml


 


Blood Product IV Normal Saline Flush 660 ml


 


Output Urine Total 1680 ml


 


Drainage Total 430 ml


 


Other 25 ml








General:  Alert, Oriented X3, Cooperative, No acute distress


Abdomen:  Soft, No tenderness, Other (NGT with bilious aspirate)


Labs





Laboratory Tests








Test


 5/5/20


18:13 5/6/20


00:09 5/6/20


06:05 5/6/20


06:09


 


Glucose (Fingerstick)


 143 mg/dL


(70-99) 151 mg/dL


(70-99) 


 128 mg/dL


(70-99)


 


White Blood Count


 


 


 8.8 x10^3/uL


(4.0-11.0) 





 


Red Blood Count


 


 


 2.30 x10^6/uL


(3.50-5.40) 





 


Hemoglobin


 


 


 6.8 g/dL


(12.0-15.5) 





 


Hematocrit


 


 


 20.9 %


(36.0-47.0) 





 


Mean Corpuscular Volume   91 fL ()  


 


Mean Corpuscular Hemoglobin   29 pg (25-35)  


 


Mean Corpuscular Hemoglobin


Concent 


 


 32 g/dL


(31-37) 





 


Red Cell Distribution Width


 


 


 18.8 %


(11.5-14.5) 





 


Platelet Count


 


 


 416 x10^3/uL


(140-400) 





 


Neutrophils (%) (Auto)   70 % (31-73)  


 


Lymphocytes (%) (Auto)   21 % (24-48)  


 


Monocytes (%) (Auto)   6 % (0-9)  


 


Eosinophils (%) (Auto)   3 % (0-3)  


 


Basophils (%) (Auto)   0 % (0-3)  


 


Neutrophils # (Auto)


 


 


 6.1 x10^3/uL


(1.8-7.7) 





 


Lymphocytes # (Auto)


 


 


 1.9 x10^3/uL


(1.0-4.8) 





 


Monocytes # (Auto)


 


 


 0.5 x10^3/uL


(0.0-1.1) 





 


Eosinophils # (Auto)


 


 


 0.2 x10^3/uL


(0.0-0.7) 





 


Basophils # (Auto)


 


 


 0.0 x10^3/uL


(0.0-0.2) 





 


Sodium Level


 


 


 152 mmol/L


(136-145) 





 


Potassium Level


 


 


 4.3 mmol/L


(3.5-5.1) 





 


Chloride Level


 


 


 113 mmol/L


() 





 


Carbon Dioxide Level


 


 


 31 mmol/L


(21-32) 





 


Anion Gap   8 (6-14)  


 


Blood Urea Nitrogen


 


 


 38 mg/dL


(7-20) 





 


Creatinine


 


 


 0.9 mg/dL


(0.6-1.0) 





 


Estimated GFR


(Cockcroft-Gault) 


 


 66.5 


 





 


BUN/Creatinine Ratio   42 (6-20)  


 


Glucose Level


 


 


 137 mg/dL


(70-99) 





 


Calcium Level


 


 


 9.0 mg/dL


(8.5-10.1) 





 


Total Bilirubin


 


 


 0.4 mg/dL


(0.2-1.0) 





 


Aspartate Amino Transf


(AST/SGOT) 


 


 35 U/L (15-37) 


 





 


Alanine Aminotransferase


(ALT/SGPT) 


 


 30 U/L (14-59) 


 





 


Alkaline Phosphatase


 


 


 84 U/L


() 





 


Total Protein


 


 


 5.6 g/dL


(6.4-8.2) 





 


Albumin


 


 


 1.8 g/dL


(3.4-5.0) 





 


Albumin/Globulin Ratio   0.5 (1.0-1.7)  


 


Test


 5/6/20


08:20 5/6/20


13:00 5/6/20


16:40 5/6/20


17:30


 


Hemoglobin


 6.4 g/dL


(12.0-15.5) 


 7.8 g/dL


(12.0-15.5) 





 


Hematocrit


 20.1 %


(36.0-47.0) 


 24.1 %


(36.0-47.0) 





 


Mean Corpuscular Hemoglobin


Concent 32 g/dL


(31-37) 


 32 g/dL


(31-37) 





 


Glucose (Fingerstick)


 


 156 mg/dL


(70-99) 


 108 mg/dL


(70-99)


 


Test


 5/7/20


00:09 5/7/20


05:00 5/7/20


11:44 





 


Glucose (Fingerstick)


 100 mg/dL


(70-99) 139 mg/dL


(70-99) 150 mg/dL


(70-99) 





 


White Blood Count


 


 9.1 x10^3/uL


(4.0-11.0) 


 





 


Red Blood Count


 


 2.44 x10^6/uL


(3.50-5.40) 


 





 


Hemoglobin


 


 7.1 g/dL


(12.0-15.5) 


 





 


Hematocrit


 


 21.9 %


(36.0-47.0) 


 





 


Mean Corpuscular Volume  90 fL ()   


 


Mean Corpuscular Hemoglobin  29 pg (25-35)   


 


Mean Corpuscular Hemoglobin


Concent 


 33 g/dL


(31-37) 


 





 


Red Cell Distribution Width


 


 17.5 %


(11.5-14.5) 


 





 


Platelet Count


 


 382 x10^3/uL


(140-400) 


 





 


Neutrophils (%) (Auto)  73 % (31-73)   


 


Lymphocytes (%) (Auto)  21 % (24-48)   


 


Monocytes (%) (Auto)  5 % (0-9)   


 


Eosinophils (%) (Auto)  1 % (0-3)   


 


Basophils (%) (Auto)  0 % (0-3)   


 


Neutrophils # (Auto)


 


 6.6 x10^3/uL


(1.8-7.7) 


 





 


Lymphocytes # (Auto)


 


 1.9 x10^3/uL


(1.0-4.8) 


 





 


Monocytes # (Auto)


 


 0.5 x10^3/uL


(0.0-1.1) 


 





 


Eosinophils # (Auto)


 


 0.1 x10^3/uL


(0.0-0.7) 


 





 


Basophils # (Auto)


 


 0.0 x10^3/uL


(0.0-0.2) 


 





 


Sodium Level


 


 152 mmol/L


(136-145) 


 





 


Potassium Level


 


 4.2 mmol/L


(3.5-5.1) 


 





 


Chloride Level


 


 116 mmol/L


() 


 





 


Carbon Dioxide Level


 


 31 mmol/L


(21-32) 


 





 


Anion Gap  5 (6-14)   


 


Blood Urea Nitrogen


 


 39 mg/dL


(7-20) 


 





 


Creatinine


 


 1.0 mg/dL


(0.6-1.0) 


 





 


Estimated GFR


(Cockcroft-Gault) 


 58.9 


 


 





 


Glucose Level


 


 153 mg/dL


(70-99) 


 





 


Calcium Level


 


 8.4 mg/dL


(8.5-10.1) 


 





 


Phosphorus Level


 


 3.3 mg/dL


(2.6-4.7) 


 





 


Magnesium Level


 


 2.0 mg/dL


(1.8-2.4) 


 











Laboratory Tests








Test


 5/6/20


13:00 5/6/20


16:40 5/6/20


17:30 5/7/20


00:09


 


Glucose (Fingerstick)


 156 mg/dL


(70-99) 


 108 mg/dL


(70-99) 100 mg/dL


(70-99)


 


Hemoglobin


 


 7.8 g/dL


(12.0-15.5) 


 





 


Hematocrit


 


 24.1 %


(36.0-47.0) 


 





 


Mean Corpuscular Hemoglobin


Concent 


 32 g/dL


(31-37) 


 





 


Test


 5/7/20


05:00 5/7/20


11:44 


 





 


White Blood Count


 9.1 x10^3/uL


(4.0-11.0) 


 


 





 


Red Blood Count


 2.44 x10^6/uL


(3.50-5.40) 


 


 





 


Hemoglobin


 7.1 g/dL


(12.0-15.5) 


 


 





 


Hematocrit


 21.9 %


(36.0-47.0) 


 


 





 


Mean Corpuscular Volume 90 fL ()    


 


Mean Corpuscular Hemoglobin 29 pg (25-35)    


 


Mean Corpuscular Hemoglobin


Concent 33 g/dL


(31-37) 


 


 





 


Red Cell Distribution Width


 17.5 %


(11.5-14.5) 


 


 





 


Platelet Count


 382 x10^3/uL


(140-400) 


 


 





 


Neutrophils (%) (Auto) 73 % (31-73)    


 


Lymphocytes (%) (Auto) 21 % (24-48)    


 


Monocytes (%) (Auto) 5 % (0-9)    


 


Eosinophils (%) (Auto) 1 % (0-3)    


 


Basophils (%) (Auto) 0 % (0-3)    


 


Neutrophils # (Auto)


 6.6 x10^3/uL


(1.8-7.7) 


 


 





 


Lymphocytes # (Auto)


 1.9 x10^3/uL


(1.0-4.8) 


 


 





 


Monocytes # (Auto)


 0.5 x10^3/uL


(0.0-1.1) 


 


 





 


Eosinophils # (Auto)


 0.1 x10^3/uL


(0.0-0.7) 


 


 





 


Basophils # (Auto)


 0.0 x10^3/uL


(0.0-0.2) 


 


 





 


Sodium Level


 152 mmol/L


(136-145) 


 


 





 


Potassium Level


 4.2 mmol/L


(3.5-5.1) 


 


 





 


Chloride Level


 116 mmol/L


() 


 


 





 


Carbon Dioxide Level


 31 mmol/L


(21-32) 


 


 





 


Anion Gap 5 (6-14)    


 


Blood Urea Nitrogen


 39 mg/dL


(7-20) 


 


 





 


Creatinine


 1.0 mg/dL


(0.6-1.0) 


 


 





 


Estimated GFR


(Cockcroft-Gault) 58.9 


 


 


 





 


Glucose Level


 153 mg/dL


(70-99) 


 


 





 


Glucose (Fingerstick)


 139 mg/dL


(70-99) 150 mg/dL


(70-99) 


 





 


Calcium Level


 8.4 mg/dL


(8.5-10.1) 


 


 





 


Phosphorus Level


 3.3 mg/dL


(2.6-4.7) 


 


 





 


Magnesium Level


 2.0 mg/dL


(1.8-2.4) 


 


 











Problem List


Problems


Medical Problems:


(1) Acute pancreatitis


Status: Acute  





(2) Cholelithiasis


Status: Acute  








Assessment/Plan


s/p lap exp


cont supportive care


considering drainage per IR











GAMAL ZHOU MD              May 7, 2020 12:45

## 2020-05-07 NOTE — RAD
EXAM: Chest, single view.

 

HISTORY: Shortness of air.

 

COMPARISON: 4/27/2020

 

FINDINGS: A frontal view of the chest is obtained. There is stable diffuse

central and lower lobe predominant infiltrate with small to moderate 

pleural effusions. The heart is normal in size. There is a right PICC with

the tip in the superior right atrium. There is a tracheostomy device 

overlying the thoracic inlet.

 

IMPRESSION: 

1. Stable diffuse infiltrate and small moderate pleural effusions.

2. Right PICC with the tip in the superior right atrium.

 

Electronically signed by: Irish Rios MD (5/7/2020 8:09 AM) [id number]

## 2020-05-08 VITALS — DIASTOLIC BLOOD PRESSURE: 82 MMHG | SYSTOLIC BLOOD PRESSURE: 157 MMHG

## 2020-05-08 VITALS — SYSTOLIC BLOOD PRESSURE: 146 MMHG | DIASTOLIC BLOOD PRESSURE: 68 MMHG

## 2020-05-08 VITALS — DIASTOLIC BLOOD PRESSURE: 73 MMHG | SYSTOLIC BLOOD PRESSURE: 144 MMHG

## 2020-05-08 VITALS — SYSTOLIC BLOOD PRESSURE: 172 MMHG | DIASTOLIC BLOOD PRESSURE: 78 MMHG

## 2020-05-08 VITALS — DIASTOLIC BLOOD PRESSURE: 74 MMHG | SYSTOLIC BLOOD PRESSURE: 130 MMHG

## 2020-05-08 VITALS — DIASTOLIC BLOOD PRESSURE: 58 MMHG | SYSTOLIC BLOOD PRESSURE: 135 MMHG

## 2020-05-08 VITALS — SYSTOLIC BLOOD PRESSURE: 109 MMHG | DIASTOLIC BLOOD PRESSURE: 55 MMHG

## 2020-05-08 VITALS — DIASTOLIC BLOOD PRESSURE: 74 MMHG | SYSTOLIC BLOOD PRESSURE: 136 MMHG

## 2020-05-08 VITALS — DIASTOLIC BLOOD PRESSURE: 63 MMHG | SYSTOLIC BLOOD PRESSURE: 110 MMHG

## 2020-05-08 VITALS — SYSTOLIC BLOOD PRESSURE: 141 MMHG | DIASTOLIC BLOOD PRESSURE: 82 MMHG

## 2020-05-08 VITALS — SYSTOLIC BLOOD PRESSURE: 135 MMHG | DIASTOLIC BLOOD PRESSURE: 80 MMHG

## 2020-05-08 VITALS — DIASTOLIC BLOOD PRESSURE: 78 MMHG | SYSTOLIC BLOOD PRESSURE: 140 MMHG

## 2020-05-08 VITALS — SYSTOLIC BLOOD PRESSURE: 145 MMHG | DIASTOLIC BLOOD PRESSURE: 74 MMHG

## 2020-05-08 VITALS — SYSTOLIC BLOOD PRESSURE: 142 MMHG | DIASTOLIC BLOOD PRESSURE: 80 MMHG

## 2020-05-08 VITALS — DIASTOLIC BLOOD PRESSURE: 79 MMHG | SYSTOLIC BLOOD PRESSURE: 147 MMHG

## 2020-05-08 VITALS — DIASTOLIC BLOOD PRESSURE: 85 MMHG | SYSTOLIC BLOOD PRESSURE: 150 MMHG

## 2020-05-08 VITALS — SYSTOLIC BLOOD PRESSURE: 161 MMHG | DIASTOLIC BLOOD PRESSURE: 82 MMHG

## 2020-05-08 VITALS — DIASTOLIC BLOOD PRESSURE: 85 MMHG | SYSTOLIC BLOOD PRESSURE: 142 MMHG

## 2020-05-08 VITALS — DIASTOLIC BLOOD PRESSURE: 87 MMHG | SYSTOLIC BLOOD PRESSURE: 162 MMHG

## 2020-05-08 VITALS — SYSTOLIC BLOOD PRESSURE: 145 MMHG | DIASTOLIC BLOOD PRESSURE: 66 MMHG

## 2020-05-08 VITALS — SYSTOLIC BLOOD PRESSURE: 148 MMHG | DIASTOLIC BLOOD PRESSURE: 77 MMHG

## 2020-05-08 VITALS — SYSTOLIC BLOOD PRESSURE: 125 MMHG | DIASTOLIC BLOOD PRESSURE: 72 MMHG

## 2020-05-08 VITALS — SYSTOLIC BLOOD PRESSURE: 87 MMHG | DIASTOLIC BLOOD PRESSURE: 41 MMHG

## 2020-05-08 VITALS — SYSTOLIC BLOOD PRESSURE: 149 MMHG | DIASTOLIC BLOOD PRESSURE: 80 MMHG

## 2020-05-08 VITALS — SYSTOLIC BLOOD PRESSURE: 127 MMHG | DIASTOLIC BLOOD PRESSURE: 68 MMHG

## 2020-05-08 VITALS — SYSTOLIC BLOOD PRESSURE: 112 MMHG | DIASTOLIC BLOOD PRESSURE: 66 MMHG

## 2020-05-08 VITALS — DIASTOLIC BLOOD PRESSURE: 77 MMHG | SYSTOLIC BLOOD PRESSURE: 134 MMHG

## 2020-05-08 VITALS — DIASTOLIC BLOOD PRESSURE: 63 MMHG | SYSTOLIC BLOOD PRESSURE: 123 MMHG

## 2020-05-08 VITALS — SYSTOLIC BLOOD PRESSURE: 123 MMHG | DIASTOLIC BLOOD PRESSURE: 76 MMHG

## 2020-05-08 VITALS — SYSTOLIC BLOOD PRESSURE: 141 MMHG | DIASTOLIC BLOOD PRESSURE: 77 MMHG

## 2020-05-08 VITALS — SYSTOLIC BLOOD PRESSURE: 136 MMHG | DIASTOLIC BLOOD PRESSURE: 80 MMHG

## 2020-05-08 VITALS — DIASTOLIC BLOOD PRESSURE: 64 MMHG | SYSTOLIC BLOOD PRESSURE: 134 MMHG

## 2020-05-08 VITALS — SYSTOLIC BLOOD PRESSURE: 148 MMHG | DIASTOLIC BLOOD PRESSURE: 74 MMHG

## 2020-05-08 VITALS — SYSTOLIC BLOOD PRESSURE: 164 MMHG | DIASTOLIC BLOOD PRESSURE: 79 MMHG

## 2020-05-08 VITALS — SYSTOLIC BLOOD PRESSURE: 113 MMHG | DIASTOLIC BLOOD PRESSURE: 58 MMHG

## 2020-05-08 VITALS — DIASTOLIC BLOOD PRESSURE: 82 MMHG | SYSTOLIC BLOOD PRESSURE: 139 MMHG

## 2020-05-08 VITALS — DIASTOLIC BLOOD PRESSURE: 83 MMHG | SYSTOLIC BLOOD PRESSURE: 154 MMHG

## 2020-05-08 LAB
ALBUMIN SERPL-MCNC: 1.7 G/DL (ref 3.4–5)
ALBUMIN/GLOB SERPL: 0.4 {RATIO} (ref 1–1.7)
ALP SERPL-CCNC: 91 U/L (ref 46–116)
ALT SERPL-CCNC: 39 U/L (ref 14–59)
ANION GAP SERPL CALC-SCNC: 6 MMOL/L (ref 6–14)
AST SERPL-CCNC: 42 U/L (ref 15–37)
BASOPHILS # BLD AUTO: 0 X10^3/UL (ref 0–0.2)
BASOPHILS NFR BLD: 0 % (ref 0–3)
BILIRUB SERPL-MCNC: 0.5 MG/DL (ref 0.2–1)
BUN SERPL-MCNC: 37 MG/DL (ref 7–20)
BUN/CREAT SERPL: 46 (ref 6–20)
CALCIUM SERPL-MCNC: 8.9 MG/DL (ref 8.5–10.1)
CHLORIDE SERPL-SCNC: 112 MMOL/L (ref 98–107)
CO2 SERPL-SCNC: 31 MMOL/L (ref 21–32)
CREAT SERPL-MCNC: 0.8 MG/DL (ref 0.6–1)
EOSINOPHIL NFR BLD: 0.2 X10^3/UL (ref 0–0.7)
EOSINOPHIL NFR BLD: 2 % (ref 0–3)
ERYTHROCYTE [DISTWIDTH] IN BLOOD BY AUTOMATED COUNT: 17.5 % (ref 11.5–14.5)
GFR SERPLBLD BASED ON 1.73 SQ M-ARVRAT: 76.2 ML/MIN
GLOBULIN SER-MCNC: 4.1 G/DL (ref 2.2–3.8)
GLUCOSE SERPL-MCNC: 156 MG/DL (ref 70–99)
HCT VFR BLD CALC: 24.3 % (ref 36–47)
HGB BLD-MCNC: 7.8 G/DL (ref 12–15.5)
LYMPHOCYTES # BLD: 1.5 X10^3/UL (ref 1–4.8)
LYMPHOCYTES NFR BLD AUTO: 15 % (ref 24–48)
MCH RBC QN AUTO: 29 PG (ref 25–35)
MCHC RBC AUTO-ENTMCNC: 32 G/DL (ref 31–37)
MCV RBC AUTO: 91 FL (ref 79–100)
MONO #: 0.5 X10^3/UL (ref 0–1.1)
MONOCYTES NFR BLD: 5 % (ref 0–9)
NEUT #: 8.1 X10^3/UL (ref 1.8–7.7)
NEUTROPHILS NFR BLD AUTO: 78 % (ref 31–73)
PLATELET # BLD AUTO: 408 X10^3/UL (ref 140–400)
POTASSIUM SERPL-SCNC: 4 MMOL/L (ref 3.5–5.1)
PROT SERPL-MCNC: 5.8 G/DL (ref 6.4–8.2)
RBC # BLD AUTO: 2.69 X10^6/UL (ref 3.5–5.4)
SODIUM SERPL-SCNC: 149 MMOL/L (ref 136–145)
WBC # BLD AUTO: 10.4 X10^3/UL (ref 4–11)

## 2020-05-08 PROCEDURE — 0W9F30Z DRAINAGE OF ABDOMINAL WALL WITH DRAINAGE DEVICE, PERCUTANEOUS APPROACH: ICD-10-PCS | Performed by: RADIOLOGY

## 2020-05-08 RX ADMIN — BACITRACIN SCH MLS/HR: 5000 INJECTION, POWDER, FOR SOLUTION INTRAMUSCULAR at 03:00

## 2020-05-08 RX ADMIN — DAPTOMYCIN SCH MLS/HR: 500 INJECTION, POWDER, LYOPHILIZED, FOR SOLUTION INTRAVENOUS at 11:29

## 2020-05-08 RX ADMIN — DEXMEDETOMIDINE HYDROCHLORIDE PRN MLS/HR: 100 INJECTION, SOLUTION, CONCENTRATE INTRAVENOUS at 08:49

## 2020-05-08 RX ADMIN — DEXMEDETOMIDINE HYDROCHLORIDE PRN MLS/HR: 100 INJECTION, SOLUTION, CONCENTRATE INTRAVENOUS at 22:01

## 2020-05-08 RX ADMIN — INSULIN LISPRO SCH UNITS: 100 INJECTION, SOLUTION INTRAVENOUS; SUBCUTANEOUS at 05:47

## 2020-05-08 RX ADMIN — DEXMEDETOMIDINE HYDROCHLORIDE PRN MLS/HR: 100 INJECTION, SOLUTION, CONCENTRATE INTRAVENOUS at 04:39

## 2020-05-08 RX ADMIN — INSULIN LISPRO SCH UNITS: 100 INJECTION, SOLUTION INTRAVENOUS; SUBCUTANEOUS at 00:00

## 2020-05-08 RX ADMIN — DEXMEDETOMIDINE HYDROCHLORIDE PRN MLS/HR: 100 INJECTION, SOLUTION, CONCENTRATE INTRAVENOUS at 00:19

## 2020-05-08 RX ADMIN — HYDROMORPHONE HYDROCHLORIDE PRN MG: 2 INJECTION INTRAMUSCULAR; INTRAVENOUS; SUBCUTANEOUS at 03:00

## 2020-05-08 RX ADMIN — DEXTRAN 70, GLYCERIN, HYPROMELLOSE PRN DROP: 1; 2; 3 SOLUTION/ DROPS OPHTHALMIC at 22:00

## 2020-05-08 RX ADMIN — PANTOPRAZOLE SODIUM SCH MG: 40 INJECTION, POWDER, FOR SOLUTION INTRAVENOUS at 08:25

## 2020-05-08 RX ADMIN — MEROPENEM SCH MLS/HR: 1 INJECTION, POWDER, FOR SOLUTION INTRAVENOUS at 22:25

## 2020-05-08 RX ADMIN — MEROPENEM SCH MLS/HR: 1 INJECTION, POWDER, FOR SOLUTION INTRAVENOUS at 14:22

## 2020-05-08 RX ADMIN — HYDROMORPHONE HYDROCHLORIDE PRN MG: 2 INJECTION INTRAMUSCULAR; INTRAVENOUS; SUBCUTANEOUS at 15:24

## 2020-05-08 RX ADMIN — INSULIN LISPRO SCH UNITS: 100 INJECTION, SOLUTION INTRAVENOUS; SUBCUTANEOUS at 18:00

## 2020-05-08 RX ADMIN — HYDROMORPHONE HYDROCHLORIDE PRN MG: 2 INJECTION INTRAMUSCULAR; INTRAVENOUS; SUBCUTANEOUS at 22:00

## 2020-05-08 RX ADMIN — Medication PRN EACH: at 11:58

## 2020-05-08 RX ADMIN — BUMETANIDE SCH MG: 0.25 INJECTION INTRAMUSCULAR; INTRAVENOUS at 10:00

## 2020-05-08 RX ADMIN — DEXMEDETOMIDINE HYDROCHLORIDE PRN MLS/HR: 100 INJECTION, SOLUTION, CONCENTRATE INTRAVENOUS at 11:32

## 2020-05-08 RX ADMIN — MEROPENEM SCH MLS/HR: 1 INJECTION, POWDER, FOR SOLUTION INTRAVENOUS at 05:46

## 2020-05-08 RX ADMIN — INSULIN LISPRO SCH UNITS: 100 INJECTION, SOLUTION INTRAVENOUS; SUBCUTANEOUS at 12:44

## 2020-05-08 RX ADMIN — DEXTROSE SCH MLS/HR: 50 INJECTION, SOLUTION INTRAVENOUS at 12:38

## 2020-05-08 NOTE — RAD
Procedure: CT-guided abdominal drain placement x3



Clinical Indication: Adult female with pancreatitis and multiple

intra-abdominal fluid collections worrisome for infected pancreatic

pseudocyst.



Sedation: Conscious sedation was administered with a total intraprocedural

face-to-face time of 47 minutes.  The patient was monitored by a qualified

independent observer throughout the time of sedation.  Please refer to the

medical record for exact doses of medications utilized to achieve moderate

sedation. 



Antibiotics: None



Sterility: The procedure was performed in its entirety using appropriate

elements of sterile technique. 



Consent: The procedure was explained in its entirety to the patient or the

patients designated representative by a member of the treatment team,

including a discussion of the risks, benefits and commonly accepted

alternatives to the procedure, as well as the expected consequences of no

therapy whatsoever.   Discussion of the risks included, but was not limited

to, those that are most frequent and those that are rare but possibly severe

or life-threatening, as well as the possibility of unforeseen complications.



Technique and Findings: Following informed consent, the patient was prepped

and draped in usual sterile fashion. Preliminary CT scan of the area of

interest was performed. 1% lidocaine was used to achieve local anesthesia over

the left lower quadrant of the abdomen after this area was prepped and draped

in usual sterile fashion. A small dermatotomy was made. An 18-gauge needle was

advanced into the fluid correction and turbid brown fluid with necrotic debris

was aspirated. This needle was then exchanged over wire for 14 Georgian pigtail

drainage catheter. This catheter was sutured to the skin and placed to bulb

suction following removal of large quantities of fluid. The area over the

right abdominal wall was then prepped and draped in usual sterile fashion.

Once again under periodic CT surveillance, 2 separate areas were anesthetized

1% lidocaine, and 2 small dermatotomies were made, one in the mid abdomen and

one in the right lower quadrant. 2 separate 19-gauge needles were advanced

into separate fluid collections on the right side, and once again turbid brown

fluid was withdrawn from each of these fluid collections. Both needles were

then exchanged over wire for 14 Georgian pigtail drainage catheters. Both of

these catheters were sutured to the skin and placed bulb suction following

aspiration of fluid as well. A total volume of 2300 cc of fluid was aspirated

from all 3 catheters.



Complications: No immediate



Impression:

1. CT-guided placement of 3 intra-abdominal drains with aspiration of 2300 cc

of thin turbid brown fluid and necrotic debris as described. 





PQRS Compliance Statement:



One or more of the following individualized dose reduction techniques were

utilized for this examination:

1. Automated exposure control

2. Adjustment of the mA and/or kV according to patient size

3. Use of iterative reconstruction technique

## 2020-05-08 NOTE — PDOC
Objective:


Objective:


Talked w/ nurse - out for drain placement, not much from NGT.


Vital Signs:





                                   Vital Signs








  Date Time  Temp Pulse Resp B/P (MAP) Pulse Ox O2 Delivery O2 Flow Rate FiO2


 


5/8/20 10:51  98 34 144/73 (96) 99 Tracheal Collar  


 


5/8/20 10:17       15.0 


 


5/8/20 08:00 97.5       





 97.5       








Labs:





Laboratory Tests








Test


 5/7/20


11:44 5/7/20


17:57 5/8/20


00:26 5/8/20


05:45


 


Glucose (Fingerstick) 150 mg/dL  152 mg/dL  134 mg/dL  


 


White Blood Count    10.4 x10^3/uL 


 


Red Blood Count    2.69 x10^6/uL 


 


Hemoglobin    7.8 g/dL 


 


Hematocrit    24.3 % 


 


Mean Corpuscular Volume    91 fL 


 


Mean Corpuscular Hemoglobin    29 pg 


 


Mean Corpuscular Hemoglobin


Concent 


 


 


 32 g/dL 





 


Red Cell Distribution Width    17.5 % 


 


Platelet Count    408 x10^3/uL 


 


Neutrophils (%) (Auto)    78 % 


 


Lymphocytes (%) (Auto)    15 % 


 


Monocytes (%) (Auto)    5 % 


 


Eosinophils (%) (Auto)    2 % 


 


Basophils (%) (Auto)    0 % 


 


Neutrophils # (Auto)    8.1 x10^3/uL 


 


Lymphocytes # (Auto)    1.5 x10^3/uL 


 


Monocytes # (Auto)    0.5 x10^3/uL 


 


Eosinophils # (Auto)    0.2 x10^3/uL 


 


Basophils # (Auto)    0.0 x10^3/uL 


 


Sodium Level    149 mmol/L 


 


Potassium Level    4.0 mmol/L 


 


Chloride Level    112 mmol/L 


 


Carbon Dioxide Level    31 mmol/L 


 


Anion Gap    6 


 


Blood Urea Nitrogen    37 mg/dL 


 


Creatinine    0.8 mg/dL 


 


Estimated GFR


(Cockcroft-Gault) 


 


 


 76.2 





 


BUN/Creatinine Ratio    46 


 


Glucose Level    156 mg/dL 


 


Calcium Level    8.9 mg/dL 


 


Total Bilirubin    0.5 mg/dL 


 


Aspartate Amino Transf


(AST/SGOT) 


 


 


 42 U/L 





 


Alanine Aminotransferase


(ALT/SGPT) 


 


 


 39 U/L 





 


Alkaline Phosphatase    91 U/L 


 


Total Protein    5.8 g/dL 


 


Albumin    1.7 g/dL 


 


Albumin/Globulin Ratio    0.4 


 


Triglycerides Level    199 mg/dL 


 


Test


 5/8/20


05:46 


 


 





 


Glucose (Fingerstick) 142 mg/dL    





  BLOOD CULTURE  Preliminary  


        NO GROWTH AFTER 4 DAYS


Imaging:


KUB 5/7


IMPRESSION: Nasogastric tube within the stomach.





IR procedure 5/8


pending





PE:


out of room





A/P:


Gallstone pancreatitis, MOSF





--


Having drains placed, will follow.





Hemodynamically unstable?:  No


Is patient in severe pain?:  Yes


Is NPO status required?:  Yes











RAGHAVENDRA MURCIA          May 8, 2020 11:17

## 2020-05-08 NOTE — PDOC
PULMONARY PROGRESS NOTES


Subjective


Patient intubated on 3/23 , s/p trach 4/6, awake alert follows commands


On pressure support


Vitals





Vital Signs








  Date Time  Temp Pulse Resp B/P (MAP) Pulse Ox O2 Delivery O2 Flow Rate FiO2


 


5/8/20 12:47   26  97 Ventilator  


 


5/8/20 12:00  78  113/58 (76)    


 


5/8/20 10:17       15.0 


 


5/8/20 08:00 97.5       





 97.5       








ROS:  No Nausea, No Chest Pain, No Abdominal Pain, No Increase Cough


General:  Alert, No acute distress


HEENT:  Other (nc at perrl   nose clear  nech  trach site ok  no lad   no 

thyromegaly)


Lungs:  Crackles


Cardiovascular:  S1, S2


Abdomen:  Soft, Non-tender, Other (firm)


Neuro Exam:  Alert


Extremities:  Other (+3 generalized edema )


Skin:  Warm, Dry


Labs





Laboratory Tests








Test


 5/6/20


16:40 5/6/20


17:30 5/7/20


00:09 5/7/20


05:00


 


Hemoglobin


 7.8 g/dL


(12.0-15.5) 


 


 7.1 g/dL


(12.0-15.5)


 


Hematocrit


 24.1 %


(36.0-47.0) 


 


 21.9 %


(36.0-47.0)


 


Mean Corpuscular Hemoglobin


Concent 32 g/dL


(31-37) 


 


 33 g/dL


(31-37)


 


Glucose (Fingerstick)


 


 108 mg/dL


(70-99) 100 mg/dL


(70-99) 139 mg/dL


(70-99)


 


White Blood Count


 


 


 


 9.1 x10^3/uL


(4.0-11.0)


 


Red Blood Count


 


 


 


 2.44 x10^6/uL


(3.50-5.40)


 


Mean Corpuscular Volume    90 fL () 


 


Mean Corpuscular Hemoglobin    29 pg (25-35) 


 


Red Cell Distribution Width


 


 


 


 17.5 %


(11.5-14.5)


 


Platelet Count


 


 


 


 382 x10^3/uL


(140-400)


 


Neutrophils (%) (Auto)    73 % (31-73) 


 


Lymphocytes (%) (Auto)    21 % (24-48) 


 


Monocytes (%) (Auto)    5 % (0-9) 


 


Eosinophils (%) (Auto)    1 % (0-3) 


 


Basophils (%) (Auto)    0 % (0-3) 


 


Neutrophils # (Auto)


 


 


 


 6.6 x10^3/uL


(1.8-7.7)


 


Lymphocytes # (Auto)


 


 


 


 1.9 x10^3/uL


(1.0-4.8)


 


Monocytes # (Auto)


 


 


 


 0.5 x10^3/uL


(0.0-1.1)


 


Eosinophils # (Auto)


 


 


 


 0.1 x10^3/uL


(0.0-0.7)


 


Basophils # (Auto)


 


 


 


 0.0 x10^3/uL


(0.0-0.2)


 


Sodium Level


 


 


 


 152 mmol/L


(136-145)


 


Potassium Level


 


 


 


 4.2 mmol/L


(3.5-5.1)


 


Chloride Level


 


 


 


 116 mmol/L


()


 


Carbon Dioxide Level


 


 


 


 31 mmol/L


(21-32)


 


Anion Gap    5 (6-14) 


 


Blood Urea Nitrogen


 


 


 


 39 mg/dL


(7-20)


 


Creatinine


 


 


 


 1.0 mg/dL


(0.6-1.0)


 


Estimated GFR


(Cockcroft-Gault) 


 


 


 58.9 





 


Glucose Level


 


 


 


 153 mg/dL


(70-99)


 


Calcium Level


 


 


 


 8.4 mg/dL


(8.5-10.1)


 


Phosphorus Level


 


 


 


 3.3 mg/dL


(2.6-4.7)


 


Magnesium Level


 


 


 


 2.0 mg/dL


(1.8-2.4)


 


Test


 5/7/20


11:44 5/7/20


17:57 5/8/20


00:26 5/8/20


05:45


 


Glucose (Fingerstick)


 150 mg/dL


(70-99) 152 mg/dL


(70-99) 134 mg/dL


(70-99) 





 


White Blood Count


 


 


 


 10.4 x10^3/uL


(4.0-11.0)


 


Red Blood Count


 


 


 


 2.69 x10^6/uL


(3.50-5.40)


 


Hemoglobin


 


 


 


 7.8 g/dL


(12.0-15.5)


 


Hematocrit


 


 


 


 24.3 %


(36.0-47.0)


 


Mean Corpuscular Volume    91 fL () 


 


Mean Corpuscular Hemoglobin    29 pg (25-35) 


 


Mean Corpuscular Hemoglobin


Concent 


 


 


 32 g/dL


(31-37)


 


Red Cell Distribution Width


 


 


 


 17.5 %


(11.5-14.5)


 


Platelet Count


 


 


 


 408 x10^3/uL


(140-400)


 


Neutrophils (%) (Auto)    78 % (31-73) 


 


Lymphocytes (%) (Auto)    15 % (24-48) 


 


Monocytes (%) (Auto)    5 % (0-9) 


 


Eosinophils (%) (Auto)    2 % (0-3) 


 


Basophils (%) (Auto)    0 % (0-3) 


 


Neutrophils # (Auto)


 


 


 


 8.1 x10^3/uL


(1.8-7.7)


 


Lymphocytes # (Auto)


 


 


 


 1.5 x10^3/uL


(1.0-4.8)


 


Monocytes # (Auto)


 


 


 


 0.5 x10^3/uL


(0.0-1.1)


 


Eosinophils # (Auto)


 


 


 


 0.2 x10^3/uL


(0.0-0.7)


 


Basophils # (Auto)


 


 


 


 0.0 x10^3/uL


(0.0-0.2)


 


Sodium Level


 


 


 


 149 mmol/L


(136-145)


 


Potassium Level


 


 


 


 4.0 mmol/L


(3.5-5.1)


 


Chloride Level


 


 


 


 112 mmol/L


()


 


Carbon Dioxide Level


 


 


 


 31 mmol/L


(21-32)


 


Anion Gap    6 (6-14) 


 


Blood Urea Nitrogen


 


 


 


 37 mg/dL


(7-20)


 


Creatinine


 


 


 


 0.8 mg/dL


(0.6-1.0)


 


Estimated GFR


(Cockcroft-Gault) 


 


 


 76.2 





 


BUN/Creatinine Ratio    46 (6-20) 


 


Glucose Level


 


 


 


 156 mg/dL


(70-99)


 


Calcium Level


 


 


 


 8.9 mg/dL


(8.5-10.1)


 


Total Bilirubin


 


 


 


 0.5 mg/dL


(0.2-1.0)


 


Aspartate Amino Transf


(AST/SGOT) 


 


 


 42 U/L (15-37) 





 


Alanine Aminotransferase


(ALT/SGPT) 


 


 


 39 U/L (14-59) 





 


Alkaline Phosphatase


 


 


 


 91 U/L


()


 


Total Protein


 


 


 


 5.8 g/dL


(6.4-8.2)


 


Albumin


 


 


 


 1.7 g/dL


(3.4-5.0)


 


Albumin/Globulin Ratio    0.4 (1.0-1.7) 


 


Triglycerides Level


 


 


 


 199 mg/dL


(0-150)


 


Test


 5/8/20


05:46 5/8/20


12:36 


 





 


Glucose (Fingerstick)


 142 mg/dL


(70-99) 151 mg/dL


(70-99) 


 











Laboratory Tests








Test


 5/7/20


17:57 5/8/20


00:26 5/8/20


05:45 5/8/20


05:46


 


Glucose (Fingerstick)


 152 mg/dL


(70-99) 134 mg/dL


(70-99) 


 142 mg/dL


(70-99)


 


White Blood Count


 


 


 10.4 x10^3/uL


(4.0-11.0) 





 


Red Blood Count


 


 


 2.69 x10^6/uL


(3.50-5.40) 





 


Hemoglobin


 


 


 7.8 g/dL


(12.0-15.5) 





 


Hematocrit


 


 


 24.3 %


(36.0-47.0) 





 


Mean Corpuscular Volume   91 fL ()  


 


Mean Corpuscular Hemoglobin   29 pg (25-35)  


 


Mean Corpuscular Hemoglobin


Concent 


 


 32 g/dL


(31-37) 





 


Red Cell Distribution Width


 


 


 17.5 %


(11.5-14.5) 





 


Platelet Count


 


 


 408 x10^3/uL


(140-400) 





 


Neutrophils (%) (Auto)   78 % (31-73)  


 


Lymphocytes (%) (Auto)   15 % (24-48)  


 


Monocytes (%) (Auto)   5 % (0-9)  


 


Eosinophils (%) (Auto)   2 % (0-3)  


 


Basophils (%) (Auto)   0 % (0-3)  


 


Neutrophils # (Auto)


 


 


 8.1 x10^3/uL


(1.8-7.7) 





 


Lymphocytes # (Auto)


 


 


 1.5 x10^3/uL


(1.0-4.8) 





 


Monocytes # (Auto)


 


 


 0.5 x10^3/uL


(0.0-1.1) 





 


Eosinophils # (Auto)


 


 


 0.2 x10^3/uL


(0.0-0.7) 





 


Basophils # (Auto)


 


 


 0.0 x10^3/uL


(0.0-0.2) 





 


Sodium Level


 


 


 149 mmol/L


(136-145) 





 


Potassium Level


 


 


 4.0 mmol/L


(3.5-5.1) 





 


Chloride Level


 


 


 112 mmol/L


() 





 


Carbon Dioxide Level


 


 


 31 mmol/L


(21-32) 





 


Anion Gap   6 (6-14)  


 


Blood Urea Nitrogen


 


 


 37 mg/dL


(7-20) 





 


Creatinine


 


 


 0.8 mg/dL


(0.6-1.0) 





 


Estimated GFR


(Cockcroft-Gault) 


 


 76.2 


 





 


BUN/Creatinine Ratio   46 (6-20)  


 


Glucose Level


 


 


 156 mg/dL


(70-99) 





 


Calcium Level


 


 


 8.9 mg/dL


(8.5-10.1) 





 


Total Bilirubin


 


 


 0.5 mg/dL


(0.2-1.0) 





 


Aspartate Amino Transf


(AST/SGOT) 


 


 42 U/L (15-37) 


 





 


Alanine Aminotransferase


(ALT/SGPT) 


 


 39 U/L (14-59) 


 





 


Alkaline Phosphatase


 


 


 91 U/L


() 





 


Total Protein


 


 


 5.8 g/dL


(6.4-8.2) 





 


Albumin


 


 


 1.7 g/dL


(3.4-5.0) 





 


Albumin/Globulin Ratio   0.4 (1.0-1.7)  


 


Triglycerides Level


 


 


 199 mg/dL


(0-150) 





 


Test


 5/8/20


12:36 


 


 





 


Glucose (Fingerstick)


 151 mg/dL


(70-99) 


 


 











Medications





Active Scripts








 Medications  Dose


 Route/Sig


 Max Daily Dose Days Date Category


 


 Bisoprolol


 Fumarate 5 Mg


 Tablet  10 Mg


 PO DAILY


   3/16/20 Reported











Impression


.


IMPRESSION:


1.  Acute hypoxemic respiratory failure secondary to ARDS status post trach,


2.  Gallstone pancreatitis


3.  Severe metabolic acidosis.stable


4.  Acute kidney injury-stable, ON HD-- continue to improve 


5.  Acute gallstone pancreatitis.


6.  Hypoalbuminemia.


7.  Moderate persistent effusions


8.  Fever-  Per ID, per surgery


9.  Chronic anemia


10. Covid 19 testing negative


11. Moderate to large ascites-S/P paracentisis


12.S/P paracentisis with 4 liters removed on 4/15/20














Surgery note


Operative Note:


After obtaining informed consent, patient was taken to OR, induced under GETA 

and prepped in the usual fashion.  5 mm port placed umbilical and right mid 

abdomen, all under laparoscopic guidance.  Large amount of ascites encountered 

and aspirated off.  Fluid was clear with some whitish debris.  Viscera was 

completely locked in with obliteration of all planes, preventing any significant

exploration.  Copious irrigation.





Given patient's overall clinical improvement, favor against open procedure and 

attempt at cholecystectomy and/or necrosectomy, given high risk of 

complications.  Cholecystectomy will need to be performed, but favor waiting 3 

months.





19 ROBERT drain placed and secured with 3 0 nylon.  Skin repaired with 4 0 monocryl.

 Dressing placed.





Patient tolerated procedure well and sent to PACU in stable condition.  All 

counts correct.  Wound class is 4.





Plan


.


Patient tired today now on pressure support


Trach shield


precedex for anxiety, prn, avoid excessive sedation


Follow surgery input


Follow ID rec, abx per id


Follow nephrology recs 


Nutritional support per surgery: continue TPN for nutrition 


DVT/GI PPX : heparin Sq/ protonix


 


d/w RN/RT





CODE:FULL











STEVE MIRANDA MD               May 8, 2020 14:55

## 2020-05-08 NOTE — PDOC
PROGRESS NOTES


Chief Complaint


Chief Complaint


Acute hypoxic Respiratory failure requiring mechanical ventilation (on vent 

since 3/23)


Tracheostomy


bilateral pleural effusions/pulm edema 


Sepsis


Severe Acute gallstone pancreatitis (not a surgical candidate at this time) with

necrosis


Acute kidney failure now requiring dialysis


Salpingitis


Gallstones (Calculus of gallbladder with acute cholecystitis without 

obstruction)


HTN 


Leukocytosis 


Hypoxia


Uterine fibroid


Intractable pain


Intractable nausea


Covid 19 negative. 


Acute on chronic anemia 


EEG: No seizure activity


ESRD on HD


Hyperglycemia





Plan:


Continue with supportive measures


status post IR procedure


BRIEF OPERATIVE NOTE


Pre-Op Diagnosis


Pancreatitis with pseudocysts, suspected infection


Post-Op Diagnosis


same


Procedure Performed


CT abdominal Drains x 3


Surgeon


Hardy


Anesthesia Type:  Conscious Sedation


Findings


3 abdominal drains, 14F, with turbid pancreatic fluid and necrotic debris in 

each.


Complications


No immediate





History of Present Illness


History of Present Illness


Ms Tadeo is a 48yo F w/ PMHx HTN, prediabetes who presented to the emergency 

room with complaints of abdominal pain on 3/16/2020. Found with Lipase 78729, 

, , Bilirubin 1.4.


CT abdomen confirms pancreatic inflammation, peripancreatic fluid and 

inflammatory changes around the pancreas consistent with pancreatitis. 

Cholelithiasis and 1.4cm uterine fibroid as well as possible left salpingitis. 

Admitted for further care


GI, General surgery, ID, Pulm consulted.





3/17: PICC placed per IR. Renal US negative. Started on levophed. Repeat CT 

abdomen w/ necrosis; 3/18: Dialysis catheter per nephrology; 3/19: On BiPAP; 

3/20: BiPAP, dialysis; 3/21: Overnight Tmax 101.7 , still on BiPAP FiO2 40%, 

still on low dose Levophed gtt, TPN initiated. On dialysis


4/6: Tracheostomy; 4/12: S/p tracheostomy on vent spontaneous respirations with 

5 of pressure support 35% FiO2, rectal tube and a Chino, off pressors; 

4/14:Still on vent via trach. Removed PICC and CVC LIJ and replaced. CT 

chest/abd/pelvis with bilateral pleural effusion and ascites.


4/15: Renal function stable. Still on vent. More interactive today. Miming wish 

for food. Plan discussed for thoracentesis/paracentesis with daughters today. 

They were under impression patient was doing worse due to a miscommunication 

which has been clarified over the phone. 4.3L removed.


4/17: Febrile overnight 101.8F. More interactive, still on vent. Asking for ice 

by miming; 4/18: Afebrile overnight. TMax last 24 hours 100.6F. Hb 7.1. 

Interactive when awake. 4/22: Transfusion 1u PRBC (6U total since admit)


4/23-4/26: TPN and precedex, vent.


4/27: Tmax 101F overnight. Hb 8.2. HD cath out since 4/24. Alert. On vent SIMV 

35% FiO2. Surgery: ex-lap, no naif or pancreatic necrosectomy 2/2 profound 

inflammation.


4/28: Seen POD #1. Afebrile overnight. BUN 62. CBC WNL today.Remains on vent via

trach, TPN. Able to point today and indicate she wishes her daughters and Rolf 

to be involved in her care.


4/29: Hb 7.4, Na 151. Remains on vent via trach, TPN. off HD for now. Not 

tolerating trach shield well.


4/30: Negative US for UE DVT. More relaxed today. Has some increase in UOP. 

Tolerating vent well. She is requesting to eat and drink via writing. T max 100F

24 hours ago, but 101F at 1200. Right PICC placed. Speaking valve for half the 

day in [facility]


5/1: Na 153 today. Good UOP. Tmax 101F at 1200 on 4/30. She becomes a bit 

anxious and did not sleep much last night, wishes to try to sleep this morning


5/2: Able to speak well with speaking valve today. Na 153, K2.9, Mg 1.8, Cr 1. 

100.4F axillary temp overnight. Asking for food.


5/3: Afebrile overnight. ABG with pH 7.27/75/123. Hb 7.1. Na 153. PCA settings 

decreased


5/4: No acute events reported overnight, case discussed with nursing staff 

patient in no acute distress no complaints during my visit


5/5: Patient responding to verbal stimuli.  She is saturating well and not 

requiring ventilation support, no acute events reported overnight seems to be 

slowly improving


5/6: Patient requiring transfusion of packed red blood cells due to anemia, no 

evidence of acute bleeding, appreciate GI consultant recommendations


5/7: Patient required ventilatory support given that she desaturated, she is 

lying in bed in mild distress, case discussed with nursing staff, no evidence of

bleeding, h an h noted. 


5/8: Underwent IR procedure as follows


BRIEF OPERATIVE NOTE


Pre-Op Diagnosis


Pancreatitis with pseudocysts, suspected infection


Post-Op Diagnosis


same


Procedure Performed


CT abdominal Drains x 3


Surgeon


Hardy


Anesthesia Type:  Conscious Sedation


Findings


3 abdominal drains, 14F, with turbid pancreatic fluid and necrotic debris in 

each.


Complications


No immediate





Plan:


vent support, wean if possible throughout the day 


Encourage activity as tolerated


Monitor fever curve, no obvious source of infection, possibly some aspiration 

events, atelectasis


will monitor for bleeding.





Vitals


Vitals





Vital Signs








  Date Time  Temp Pulse Resp B/P (MAP) Pulse Ox O2 Delivery O2 Flow Rate FiO2


 


5/8/20 12:47   26  97 Ventilator  


 


5/8/20 12:00  78  113/58 (76)    


 


5/8/20 10:17       15.0 


 


5/8/20 08:00 97.5       





 97.5       











Physical Exam


Physical Exam


GENERAL: Propped up in bed. Alert and coop.  Looks a little tired


HEENT:  oral cavity dry, NGT


NECK:  Trach/vent 


LUNGS: rhonchi 


HEART:  S1, S2, regular 


ABDOMEN: Distended, hypoactive BS, drain placement (4/27 - serous fluid)


: Chino (4/14)


EXTREMITIES: Generalized edema, no cyanosis, SCDs bilaterally 


DERMATOLOGIC:  Warm and dry.  No generalized rash.  


CENTRAL NERVOUS SYSTEM: weak, mouthing words to simple questions 


PICC line in place April 30, 2020 clean


HDC has been removed


LIJ removed


General:  Alert, Oriented X3, Cooperative, No acute distress


Heart:  Regular rate, No murmurs


Lungs:  Crackles


Abdomen:  Soft, No tenderness, Other (NGT with bilious aspirate)


Extremities:  No edema


Skin:  Other (mottling noted to extremities )





Labs


LABS





Laboratory Tests








Test


 5/7/20


17:57 5/8/20


00:26 5/8/20


05:45 5/8/20


05:46


 


Glucose (Fingerstick)


 152 mg/dL


(70-99) 134 mg/dL


(70-99) 


 142 mg/dL


(70-99)


 


White Blood Count


 


 


 10.4 x10^3/uL


(4.0-11.0) 





 


Red Blood Count


 


 


 2.69 x10^6/uL


(3.50-5.40) 





 


Hemoglobin


 


 


 7.8 g/dL


(12.0-15.5) 





 


Hematocrit


 


 


 24.3 %


(36.0-47.0) 





 


Mean Corpuscular Volume   91 fL ()  


 


Mean Corpuscular Hemoglobin   29 pg (25-35)  


 


Mean Corpuscular Hemoglobin


Concent 


 


 32 g/dL


(31-37) 





 


Red Cell Distribution Width


 


 


 17.5 %


(11.5-14.5) 





 


Platelet Count


 


 


 408 x10^3/uL


(140-400) 





 


Neutrophils (%) (Auto)   78 % (31-73)  


 


Lymphocytes (%) (Auto)   15 % (24-48)  


 


Monocytes (%) (Auto)   5 % (0-9)  


 


Eosinophils (%) (Auto)   2 % (0-3)  


 


Basophils (%) (Auto)   0 % (0-3)  


 


Neutrophils # (Auto)


 


 


 8.1 x10^3/uL


(1.8-7.7) 





 


Lymphocytes # (Auto)


 


 


 1.5 x10^3/uL


(1.0-4.8) 





 


Monocytes # (Auto)


 


 


 0.5 x10^3/uL


(0.0-1.1) 





 


Eosinophils # (Auto)


 


 


 0.2 x10^3/uL


(0.0-0.7) 





 


Basophils # (Auto)


 


 


 0.0 x10^3/uL


(0.0-0.2) 





 


Sodium Level


 


 


 149 mmol/L


(136-145) 





 


Potassium Level


 


 


 4.0 mmol/L


(3.5-5.1) 





 


Chloride Level


 


 


 112 mmol/L


() 





 


Carbon Dioxide Level


 


 


 31 mmol/L


(21-32) 





 


Anion Gap   6 (6-14)  


 


Blood Urea Nitrogen


 


 


 37 mg/dL


(7-20) 





 


Creatinine


 


 


 0.8 mg/dL


(0.6-1.0) 





 


Estimated GFR


(Cockcroft-Gault) 


 


 76.2 


 





 


BUN/Creatinine Ratio   46 (6-20)  


 


Glucose Level


 


 


 156 mg/dL


(70-99) 





 


Calcium Level


 


 


 8.9 mg/dL


(8.5-10.1) 





 


Total Bilirubin


 


 


 0.5 mg/dL


(0.2-1.0) 





 


Aspartate Amino Transf


(AST/SGOT) 


 


 42 U/L (15-37) 


 





 


Alanine Aminotransferase


(ALT/SGPT) 


 


 39 U/L (14-59) 


 





 


Alkaline Phosphatase


 


 


 91 U/L


() 





 


Total Protein


 


 


 5.8 g/dL


(6.4-8.2) 





 


Albumin


 


 


 1.7 g/dL


(3.4-5.0) 





 


Albumin/Globulin Ratio   0.4 (1.0-1.7)  


 


Triglycerides Level


 


 


 199 mg/dL


(0-150) 





 


Test


 5/8/20


12:36 


 


 





 


Glucose (Fingerstick)


 151 mg/dL


(70-99) 


 


 














Assessment and Plan


Assessmemt and Plan


Problems


Medical Problems:


(1) Acute pancreatitis


Status: Acute  





(2) Cholelithiasis


Status: Acute  











Comment


Review of Relevant


I have reviewed the following items josy (where applicable) has been applied.


Labs





Laboratory Tests








Test


 5/6/20


16:40 5/6/20


17:30 5/7/20


00:09 5/7/20


05:00


 


Hemoglobin


 7.8 g/dL


(12.0-15.5) 


 


 7.1 g/dL


(12.0-15.5)


 


Hematocrit


 24.1 %


(36.0-47.0) 


 


 21.9 %


(36.0-47.0)


 


Mean Corpuscular Hemoglobin


Concent 32 g/dL


(31-37) 


 


 33 g/dL


(31-37)


 


Glucose (Fingerstick)


 


 108 mg/dL


(70-99) 100 mg/dL


(70-99) 139 mg/dL


(70-99)


 


White Blood Count


 


 


 


 9.1 x10^3/uL


(4.0-11.0)


 


Red Blood Count


 


 


 


 2.44 x10^6/uL


(3.50-5.40)


 


Mean Corpuscular Volume    90 fL () 


 


Mean Corpuscular Hemoglobin    29 pg (25-35) 


 


Red Cell Distribution Width


 


 


 


 17.5 %


(11.5-14.5)


 


Platelet Count


 


 


 


 382 x10^3/uL


(140-400)


 


Neutrophils (%) (Auto)    73 % (31-73) 


 


Lymphocytes (%) (Auto)    21 % (24-48) 


 


Monocytes (%) (Auto)    5 % (0-9) 


 


Eosinophils (%) (Auto)    1 % (0-3) 


 


Basophils (%) (Auto)    0 % (0-3) 


 


Neutrophils # (Auto)


 


 


 


 6.6 x10^3/uL


(1.8-7.7)


 


Lymphocytes # (Auto)


 


 


 


 1.9 x10^3/uL


(1.0-4.8)


 


Monocytes # (Auto)


 


 


 


 0.5 x10^3/uL


(0.0-1.1)


 


Eosinophils # (Auto)


 


 


 


 0.1 x10^3/uL


(0.0-0.7)


 


Basophils # (Auto)


 


 


 


 0.0 x10^3/uL


(0.0-0.2)


 


Sodium Level


 


 


 


 152 mmol/L


(136-145)


 


Potassium Level


 


 


 


 4.2 mmol/L


(3.5-5.1)


 


Chloride Level


 


 


 


 116 mmol/L


()


 


Carbon Dioxide Level


 


 


 


 31 mmol/L


(21-32)


 


Anion Gap    5 (6-14) 


 


Blood Urea Nitrogen


 


 


 


 39 mg/dL


(7-20)


 


Creatinine


 


 


 


 1.0 mg/dL


(0.6-1.0)


 


Estimated GFR


(Cockcroft-Gault) 


 


 


 58.9 





 


Glucose Level


 


 


 


 153 mg/dL


(70-99)


 


Calcium Level


 


 


 


 8.4 mg/dL


(8.5-10.1)


 


Phosphorus Level


 


 


 


 3.3 mg/dL


(2.6-4.7)


 


Magnesium Level


 


 


 


 2.0 mg/dL


(1.8-2.4)


 


Test


 5/7/20


11:44 5/7/20


17:57 5/8/20


00:26 5/8/20


05:45


 


Glucose (Fingerstick)


 150 mg/dL


(70-99) 152 mg/dL


(70-99) 134 mg/dL


(70-99) 





 


White Blood Count


 


 


 


 10.4 x10^3/uL


(4.0-11.0)


 


Red Blood Count


 


 


 


 2.69 x10^6/uL


(3.50-5.40)


 


Hemoglobin


 


 


 


 7.8 g/dL


(12.0-15.5)


 


Hematocrit


 


 


 


 24.3 %


(36.0-47.0)


 


Mean Corpuscular Volume    91 fL () 


 


Mean Corpuscular Hemoglobin    29 pg (25-35) 


 


Mean Corpuscular Hemoglobin


Concent 


 


 


 32 g/dL


(31-37)


 


Red Cell Distribution Width


 


 


 


 17.5 %


(11.5-14.5)


 


Platelet Count


 


 


 


 408 x10^3/uL


(140-400)


 


Neutrophils (%) (Auto)    78 % (31-73) 


 


Lymphocytes (%) (Auto)    15 % (24-48) 


 


Monocytes (%) (Auto)    5 % (0-9) 


 


Eosinophils (%) (Auto)    2 % (0-3) 


 


Basophils (%) (Auto)    0 % (0-3) 


 


Neutrophils # (Auto)


 


 


 


 8.1 x10^3/uL


(1.8-7.7)


 


Lymphocytes # (Auto)


 


 


 


 1.5 x10^3/uL


(1.0-4.8)


 


Monocytes # (Auto)


 


 


 


 0.5 x10^3/uL


(0.0-1.1)


 


Eosinophils # (Auto)


 


 


 


 0.2 x10^3/uL


(0.0-0.7)


 


Basophils # (Auto)


 


 


 


 0.0 x10^3/uL


(0.0-0.2)


 


Sodium Level


 


 


 


 149 mmol/L


(136-145)


 


Potassium Level


 


 


 


 4.0 mmol/L


(3.5-5.1)


 


Chloride Level


 


 


 


 112 mmol/L


()


 


Carbon Dioxide Level


 


 


 


 31 mmol/L


(21-32)


 


Anion Gap    6 (6-14) 


 


Blood Urea Nitrogen


 


 


 


 37 mg/dL


(7-20)


 


Creatinine


 


 


 


 0.8 mg/dL


(0.6-1.0)


 


Estimated GFR


(Cockcroft-Gault) 


 


 


 76.2 





 


BUN/Creatinine Ratio    46 (6-20) 


 


Glucose Level


 


 


 


 156 mg/dL


(70-99)


 


Calcium Level


 


 


 


 8.9 mg/dL


(8.5-10.1)


 


Total Bilirubin


 


 


 


 0.5 mg/dL


(0.2-1.0)


 


Aspartate Amino Transf


(AST/SGOT) 


 


 


 42 U/L (15-37) 





 


Alanine Aminotransferase


(ALT/SGPT) 


 


 


 39 U/L (14-59) 





 


Alkaline Phosphatase


 


 


 


 91 U/L


()


 


Total Protein


 


 


 


 5.8 g/dL


(6.4-8.2)


 


Albumin


 


 


 


 1.7 g/dL


(3.4-5.0)


 


Albumin/Globulin Ratio    0.4 (1.0-1.7) 


 


Triglycerides Level


 


 


 


 199 mg/dL


(0-150)


 


Test


 5/8/20


05:46 5/8/20


12:36 


 





 


Glucose (Fingerstick)


 142 mg/dL


(70-99) 151 mg/dL


(70-99) 


 











Laboratory Tests








Test


 5/7/20


17:57 5/8/20


00:26 5/8/20


05:45 5/8/20


05:46


 


Glucose (Fingerstick)


 152 mg/dL


(70-99) 134 mg/dL


(70-99) 


 142 mg/dL


(70-99)


 


White Blood Count


 


 


 10.4 x10^3/uL


(4.0-11.0) 





 


Red Blood Count


 


 


 2.69 x10^6/uL


(3.50-5.40) 





 


Hemoglobin


 


 


 7.8 g/dL


(12.0-15.5) 





 


Hematocrit


 


 


 24.3 %


(36.0-47.0) 





 


Mean Corpuscular Volume   91 fL ()  


 


Mean Corpuscular Hemoglobin   29 pg (25-35)  


 


Mean Corpuscular Hemoglobin


Concent 


 


 32 g/dL


(31-37) 





 


Red Cell Distribution Width


 


 


 17.5 %


(11.5-14.5) 





 


Platelet Count


 


 


 408 x10^3/uL


(140-400) 





 


Neutrophils (%) (Auto)   78 % (31-73)  


 


Lymphocytes (%) (Auto)   15 % (24-48)  


 


Monocytes (%) (Auto)   5 % (0-9)  


 


Eosinophils (%) (Auto)   2 % (0-3)  


 


Basophils (%) (Auto)   0 % (0-3)  


 


Neutrophils # (Auto)


 


 


 8.1 x10^3/uL


(1.8-7.7) 





 


Lymphocytes # (Auto)


 


 


 1.5 x10^3/uL


(1.0-4.8) 





 


Monocytes # (Auto)


 


 


 0.5 x10^3/uL


(0.0-1.1) 





 


Eosinophils # (Auto)


 


 


 0.2 x10^3/uL


(0.0-0.7) 





 


Basophils # (Auto)


 


 


 0.0 x10^3/uL


(0.0-0.2) 





 


Sodium Level


 


 


 149 mmol/L


(136-145) 





 


Potassium Level


 


 


 4.0 mmol/L


(3.5-5.1) 





 


Chloride Level


 


 


 112 mmol/L


() 





 


Carbon Dioxide Level


 


 


 31 mmol/L


(21-32) 





 


Anion Gap   6 (6-14)  


 


Blood Urea Nitrogen


 


 


 37 mg/dL


(7-20) 





 


Creatinine


 


 


 0.8 mg/dL


(0.6-1.0) 





 


Estimated GFR


(Cockcroft-Gault) 


 


 76.2 


 





 


BUN/Creatinine Ratio   46 (6-20)  


 


Glucose Level


 


 


 156 mg/dL


(70-99) 





 


Calcium Level


 


 


 8.9 mg/dL


(8.5-10.1) 





 


Total Bilirubin


 


 


 0.5 mg/dL


(0.2-1.0) 





 


Aspartate Amino Transf


(AST/SGOT) 


 


 42 U/L (15-37) 


 





 


Alanine Aminotransferase


(ALT/SGPT) 


 


 39 U/L (14-59) 


 





 


Alkaline Phosphatase


 


 


 91 U/L


() 





 


Total Protein


 


 


 5.8 g/dL


(6.4-8.2) 





 


Albumin


 


 


 1.7 g/dL


(3.4-5.0) 





 


Albumin/Globulin Ratio   0.4 (1.0-1.7)  


 


Triglycerides Level


 


 


 199 mg/dL


(0-150) 





 


Test


 5/8/20


12:36 


 


 





 


Glucose (Fingerstick)


 151 mg/dL


(70-99) 


 


 











Microbiology


5/4/20 Blood Culture - Preliminary, Resulted


         NO GROWTH AFTER 4 DAYS


4/30/20 Aerobic Culture - Final, Complete


          


4/30/20 Aerobic Culture Result 1 (ANSON) - Final, Complete


          


4/30/20 Gram Stain - Final, Complete


          


4/30/20 Gram Stain Result 1 (ANSON) - Final, Complete


          


4/30/20 Gram Stain Result 2 (ANSON) - Final, Complete


          


4/27/20 Aerobic and Anaerobic Culture - Final, Complete


          


4/27/20 Anaerobic Culture Result 1 (ANSON) - Final, Complete


          


4/27/20 Aerobic Culture - Final, Complete


          


4/27/20 Aerobic Culture Result 1 (ANSON) - Final, Complete


          


4/27/20 Gram Stain - Final, Complete


          


4/27/20 Gram Stain Result 1 (ANSON) - Final, Complete


          


4/27/20 Gram Stain Result 2 (ANSON) - Final, Complete


          


4/12/20 Urine Culture - Final, Complete


          


4/12/20 Urine Culture Result 1 (ANSON) - Final, Complete


Medications





Current Medications


Sodium Chloride 1,000 ml @  1,000 mls/hr Q1H IV  Last administered on 3/16/20at 

03:00;  Start 3/16/20 at 03:00;  Stop 3/16/20 at 03:59;  Status DC


Ondansetron HCl (Zofran) 4 mg 1X  ONCE IVP  Last administered on 3/16/20at 

03:27;  Start 3/16/20 at 03:00;  Stop 3/16/20 at 03:01;  Status DC


Morphine Sulfate (Morphine Sulfate) 4 mg 1X  ONCE IV ;  Start 3/16/20 at 03:00; 

Stop 3/16/20 at 03:01;  Status Cancel


Ketorolac Tromethamine (Toradol 30mg Vial) 30 mg 1X  ONCE IV  Last administered 

on 3/16/20at 02:54;  Start 3/16/20 at 03:00;  Stop 3/16/20 at 03:01;  Status DC


Fentanyl Citrate (Fentanyl 2ml Vial) 25 mcg 1X  ONCE IVP  Last administered on 

3/16/20at 03:23;  Start 3/16/20 at 03:30;  Stop 3/16/20 at 03:31;  Status DC


Fentanyl Citrate (Fentanyl 2ml Vial) 100 mcg STK-MED ONCE .ROUTE ;  Start 

3/16/20 at 03:18;  Stop 3/16/20 at 03:18;  Status DC


Iohexol (Omnipaque 350 Mg/ml) 90 ml 1X  ONCE IV  Last administered on 3/16/20at 

03:25;  Start 3/16/20 at 03:30;  Stop 3/16/20 at 03:31;  Status DC


Info (CONTRAST GIVEN -- Rx MONITORING) 1 each PRN DAILY  PRN MC SEE COMMENTS;  

Start 3/16/20 at 03:30;  Stop 3/18/20 at 03:29;  Status DC


Hydromorphone HCl (Dilaudid) 0.5 mg 1X  ONCE IV  Last administered on 3/16/20at 

03:55;  Start 3/16/20 at 04:30;  Stop 3/16/20 at 04:32;  Status DC


Ondansetron HCl (Zofran) 4 mg PRN Q8HRS  PRN IV NAUSEA/VOMITING 1ST CHOICE;  

Start 3/16/20 at 05:00;  Stop 3/16/20 at 09:27;  Status DC


Morphine Sulfate (Morphine Sulfate) 2 mg PRN Q2HR  PRN IV SEVERE PAIN 7-10 Last 

administered on 3/17/20at 12:26;  Start 3/16/20 at 05:00;  Stop 3/17/20 at 

14:15;  Status DC


Sodium Chloride 1,000 ml @  125 mls/hr Q8H IV  Last administered on 3/16/20at 

20:56;  Start 3/16/20 at 05:00;  Stop 3/17/20 at 04:59;  Status DC


Hydromorphone HCl (Dilaudid) 0.5 mg PRN Q3HRS  PRN IV SEVERE PAIN 7-10 Last 

administered on 3/17/20at 10:06;  Start 3/16/20 at 05:00;  Stop 3/17/20 at 12:01

;  Status DC


Piperacillin Sod/ Tazobactam Sod 4.5 gm/Sodium Chloride 100 ml @  200 mls/hr 1X 

ONCE IV  Last administered on 3/16/20at 05:44;  Start 3/16/20 at 06:00;  Stop 

3/16/20 at 06:29;  Status DC


Ondansetron HCl (Zofran) 4 mg PRN Q4HRS  PRN IV NAUSEA/VOMITING 1ST CHOICE Last 

administered on 5/7/20at 08:07;  Start 3/16/20 at 09:30


Insulin Human Lispro (HumaLOG) 0-9 UNITS Q6HRS SQ  Last administered on 5/8/20at

12:44;  Start 3/16/20 at 09:30


Dextrose (Dextrose 50%-Water Syringe) 12.5 gm PRN Q15MIN  PRN IV SEE COMMENTS;  

Start 3/16/20 at 09:30


Pantoprazole Sodium (PROTONIX VIAL for IV PUSH) 40 mg DAILYAC IVP  Last 

administered on 5/8/20at 08:25;  Start 3/16/20 at 11:30


Prochlorperazine Edisylate (Compazine) 10 mg PRN Q6HRS  PRN IV NAUSEA/VOMITING, 

2nd CHOICE Last administered on 5/7/20at 03:57;  Start 3/16/20 at 17:45


Atenolol (Tenormin) 100 mg DAILY PO ;  Start 3/17/20 at 09:00;  Stop 3/16/20 at 

20:08;  Status DC


Metoprolol Tartrate (Lopressor Vial) 2.5 mg Q6HRS IVP  Last administered on 

3/17/20at 05:51;  Start 3/16/20 at 20:15;  Stop 3/17/20 at 10:02;  Status DC


Metoprolol Tartrate (Lopressor Vial) 5 mg Q6HRS IVP  Last administered on 

3/26/20at 00:12;  Start 3/17/20 at 10:15;  Stop 3/28/20 at 08:48;  Status DC


Hydromorphone HCl (Dilaudid) 1 mg PRN Q3HRS  PRN IV SEVERE PAIN 7-10 Last 

administered on 3/23/20at 05:13;  Start 3/17/20 at 12:00;  Stop 3/31/20 at 

00:25;  Status DC


Lidocaine HCl (Buffered Lidocaine 1%) 3 ml STK-MED ONCE .ROUTE ;  Start 3/17/20 

at 12:55;  Stop 3/17/20 at 12:56;  Status DC


Albumin Human 500 ml @  125 mls/hr 1X  ONCE IV  Last administered on 3/17/20at 

14:33;  Start 3/17/20 at 14:30;  Stop 3/17/20 at 18:32;  Status DC


Norepinephrine Bitartrate 8 mg/ Dextrose 258 ml @  17.299 mls/ hr CONT  PRN IV 

PER PROTOCOL Last administered on 4/14/20at 12:48;  Start 3/17/20 at 15:30;  

Stop 4/17/20 at 09:19;  Status DC


Sodium Chloride 1,000 ml @  125 mls/hr Q8H IV  Last administered on 3/17/20at 

21:04;  Start 3/17/20 at 16:00;  Stop 3/18/20 at 02:42;  Status DC


Albumin Human 500 ml @  125 mls/hr PRN BID  PRN IV After every 2L NSS & BP < 

90mm Last administered on 4/1/20at 14:21;  Start 3/17/20 at 16:00


Iohexol (Omnipaque 300 Mg/ml) 60 ml 1X  ONCE IV  Last administered on 3/17/20at 

17:20;  Start 3/17/20 at 17:00;  Stop 3/17/20 at 17:01;  Status DC


Info (CONTRAST GIVEN -- Rx MONITORING) 1 each PRN DAILY  PRN MC SEE COMMENTS;  

Start 3/17/20 at 17:00;  Stop 3/19/20 at 16:59;  Status DC


Meropenem 1 gm/ Sodium Chloride 100 ml @  200 mls/hr Q8HRS IV  Last administered

on 3/18/20at 05:45;  Start 3/17/20 at 20:00;  Stop 3/18/20 at 08:48;  Status DC


Furosemide (Lasix) 40 mg 1X  ONCE IVP  Last administered on 3/17/20at 22:12;  

Start 3/17/20 at 22:30;  Stop 3/17/20 at 22:31;  Status DC


Calcium Chloride 1000 mg/Sodium Chloride 110 ml @  220 mls/hr 1X  ONCE IV  Last 

administered on 3/17/20at 22:11;  Start 3/17/20 at 22:30;  Stop 3/17/20 at 

22:59;  Status DC


Albuterol Sulfate (Ventolin Neb Soln) 2.5 mg 1X  ONCE NEB  Last administered on 

3/18/20at 00:56;  Start 3/17/20 at 22:30;  Stop 3/17/20 at 22:31;  Status DC


Insulin Human Regular (HumuLIN R VIAL) 5 unit 1X  ONCE IV  Last administered on 

3/17/20at 22:14;  Start 3/17/20 at 22:30;  Stop 3/17/20 at 22:31;  Status DC


Magnesium Sulfate 50 ml @ 25 mls/hr 1X  ONCE IV  Last administered on 3/18/20at 

02:57;  Start 3/18/20 at 03:00;  Stop 3/18/20 at 04:59;  Status DC


Calcium Gluconate 1000 mg/Sodium Chloride 110 ml @  220 mls/hr 1X  ONCE IV  Last

administered on 3/18/20at 02:46;  Start 3/18/20 at 03:00;  Stop 3/18/20 at 

03:29;  Status DC


Sodium Chloride 1,000 ml @  200 mls/hr Q5H IV  Last administered on 3/18/20at 

02:46;  Start 3/18/20 at 03:00;  Stop 3/18/20 at 10:21;  Status DC


Calcium Gluconate 1000 mg/Sodium Chloride 110 ml @  220 mls/hr 1X  ONCE IV  Last

administered on 3/18/20at 03:21;  Start 3/18/20 at 03:30;  Stop 3/18/20 at 

03:59;  Status DC


Sodium Bicarbonate 50 meq/Sodium Chloride 1,050 ml @  75 mls/hr Q14H IV  Last 

administered on 3/22/20at 21:10;  Start 3/18/20 at 07:30;  Stop 3/23/20 at 

10:28;  Status DC


Calcium Gluconate 2000 mg/Sodium Chloride 120 ml @  220 mls/hr 1X  ONCE IV  Last

administered on 3/18/20at 09:05;  Start 3/18/20 at 07:30;  Stop 3/18/20 at 

08:02;  Status DC


Lidocaine HCl (Xylocaine-Mpf 1% 2ml Vial) 2 ml STK-MED ONCE .ROUTE ;  Start 

3/18/20 at 08:47;  Stop 3/18/20 at 08:47;  Status DC


Meropenem 500 mg/ Sodium Chloride 50 ml @  100 mls/hr Q12HR IV  Last 

administered on 3/23/20at 21:01;  Start 3/18/20 at 18:00;  Stop 3/24/20 at 07

:58;  Status DC


Lidocaine HCl (Buffered Lidocaine 1%) 3 ml STK-MED ONCE .ROUTE ;  Start 3/18/20 

at 09:46;  Stop 3/18/20 at 09:46;  Status DC


Lidocaine HCl (Buffered Lidocaine 1%) 6 ml 1X  ONCE INJ  Last administered on 

3/18/20at 10:26;  Start 3/18/20 at 10:15;  Stop 3/18/20 at 10:16;  Status DC


Info (Tpn Per Pharmacy) 1 each PRN DAILY  PRN MC SEE COMMENTS Last administered 

on 5/8/20at 11:58;  Start 3/18/20 at 12:00


Sodium Chloride 1,000 ml @  1,000 mls/hr Q1H PRN IV hypotension;  Start 3/18/20 

at 12:07;  Stop 3/18/20 at 18:06;  Status DC


Diphenhydramine HCl (Benadryl) 25 mg 1X PRN  PRN IV ITCHING;  Start 3/18/20 at 

12:15;  Stop 3/19/20 at 12:14;  Status DC


Diphenhydramine HCl (Benadryl) 25 mg 1X PRN  PRN IV ITCHING;  Start 3/18/20 at 

12:15;  Stop 3/19/20 at 12:14;  Status DC


Sodium Chloride 1,000 ml @  400 mls/hr Q2H30M PRN IV PATENCY;  Start 3/18/20 at 

12:07;  Stop 3/19/20 at 00:06;  Status DC


Info (PHARMACY MONITORING -- do not chart) 1 each PRN DAILY  PRN MC SEE 

COMMENTS;  Start 3/18/20 at 12:15;  Stop 3/20/20 at 08:13;  Status DC


Sodium Chloride 90 meq/Calcium Gluconate 10 meq/ Multivitamins 10 ml/Chromium/ 

Copper/Manganese/ Seleni/Zn 1 ml/ Total Parenteral Nutrition/Amino 

Acids/Dextrose/ Fat Emulsion Intravenous 55.005 ml  @ 2.292 mls/hr TPN  CONT IV 

;  Start 3/18/20 at 22:00;  Stop 3/18/20 at 12:33;  Status DC


Info (Tpn Per Pharmacy) 1 each PRN DAILY  PRN MC SEE COMMENTS;  Start 3/18/20 at

12:30;  Status UNV


Sodium Chloride 90 meq/Calcium Gluconate 10 meq/ Multivitamins 10 ml/Chromium/ 

Copper/Manganese/ Seleni/Zn 0.5 ml/ Total Parenteral Nutrition/Amino 

Acids/Dextrose/ Fat Emulsion Intravenous 1,512 ml @  63 mls/hr TPN  CONT IV  L

ast administered on 3/18/20at 22:06;  Start 3/18/20 at 22:00;  Stop 3/19/20 at 

21:59;  Status DC


Calcium Carbonate/ Glycine (Tums) 500 mg PRN AFTMEALHC  PRN PO INDIGESTION;  

Start 3/18/20 at 17:45


Calcium Gluconate (Calcium Gluconate) 2,000 mg 1X  ONCE IVP  Last administered 

on 3/19/20at 02:19;  Start 3/19/20 at 02:15;  Stop 3/19/20 at 02:16;  Status DC


Calcium Chloride 3000 mg/Sodium Chloride 1,030 ml @  50 mls/hr [id number] IV  Last 

administered on 3/21/20at 02:17;  Start 3/19/20 at 08:00;  Stop 3/21/20 at 1

5:23;  Status DC


Lorazepam (Ativan Inj) 1 mg PRN Q4HRS  PRN IVP ANXIETY / AGITATION, 2nd choic 

Last administered on 4/17/20at 03:51;  Start 3/19/20 at 09:00;  Stop 4/17/20 at 

09:19;  Status DC


Sodium Chloride 1,000 ml @  1,000 mls/hr Q1H PRN IV hypotension;  Start 3/19/20 

at 08:56;  Stop 3/19/20 at 14:55;  Status DC


Albumin Human 200 ml @  200 mls/hr 1X PRN  PRN IV Hypotension;  Start 3/19/20 at

09:00;  Stop 3/19/20 at 14:59;  Status DC


Diphenhydramine HCl (Benadryl) 25 mg 1X PRN  PRN IV ITCHING;  Start 3/19/20 at 

09:00;  Stop 3/20/20 at 08:59;  Status DC


Diphenhydramine HCl (Benadryl) 25 mg 1X PRN  PRN IV ITCHING;  Start 3/19/20 at 

09:00;  Stop 3/20/20 at 08:59;  Status DC


Sodium Chloride 1,000 ml @  400 mls/hr Q2H30M PRN IV PATENCY;  Start 3/19/20 at 

08:56;  Stop 3/19/20 at 20:55;  Status DC


Info (PHARMACY MONITORING -- do not chart) 1 each PRN DAILY  PRN MC SEE 

COMMENTS;  Start 3/19/20 at 09:00;  Status UNV


Info (PHARMACY MONITORING -- do not chart) 1 each PRN DAILY  PRN MC SEE 

COMMENTS;  Start 3/19/20 at 09:00;  Stop 3/20/20 at 08:13;  Status DC


Digoxin (Lanoxin) 500 mcg 1X  ONCE IV  Last administered on 3/19/20at 10:04;  

Start 3/19/20 at 10:00;  Stop 3/19/20 at 10:01;  Status DC


Digoxin (Lanoxin) 125 mcg 1X  ONCE IV  Last administered on 3/19/20at 17:10;  

Start 3/19/20 at 18:00;  Stop 3/19/20 at 18:01;  Status DC


Magnesium Sulfate 100 ml @  25 mls/hr 1X  ONCE IV  Last administered on 

3/19/20at 12:48;  Start 3/19/20 at 13:00;  Stop 3/19/20 at 16:59;  Status DC


Sodium Chloride 90 meq/Magnesium Sulfate 10 meq/ Calcium Gluconate 20 meq/ 

Multivitamins 10 ml/Chromium/ Copper/Manganese/ Seleni/Zn 0.5 ml/ Total 

Parenteral Nutrition/Amino Acids/Dextrose/ Fat Emulsion Intravenous 1,512 ml @  

63 mls/hr TPN  CONT IV  Last administered on 3/19/20at 22:25;  Start 3/19/20 at 

22:00;  Stop 3/20/20 at 21:59;  Status DC


Sodium Chloride 1,000 ml @  1,000 mls/hr Q1H PRN IV hypotension;  Start 3/20/20 

at 08:05;  Stop 3/20/20 at 14:04;  Status DC


Albumin Human 200 ml @  200 mls/hr 1X  ONCE IV  Last administered on 3/20/20at 

08:57;  Start 3/20/20 at 08:15;  Stop 3/20/20 at 09:14;  Status DC


Diphenhydramine HCl (Benadryl) 25 mg 1X PRN  PRN IV ITCHING;  Start 3/20/20 at 

08:15;  Stop 3/21/20 at 08:14;  Status DC


Diphenhydramine HCl (Benadryl) 25 mg 1X PRN  PRN IV ITCHING;  Start 3/20/20 at 

08:15;  Stop 3/21/20 at 08:14;  Status DC


Sodium Chloride 1,000 ml @  400 mls/hr Q2H30M PRN IV PATENCY;  Start 3/20/20 at 

08:05;  Stop 3/20/20 at 20:04;  Status DC


Info (PHARMACY MONITORING -- do not chart) 1 each PRN DAILY  PRN MC SEE 

COMMENTS;  Start 3/20/20 at 08:15;  Stop 3/24/20 at 07:57;  Status DC


Sodium Chloride 90 meq/Potassium Chloride 15 meq/ Potassium Phosphate 10 mmol/ 

Magnesium Sulfate 10 meq/Calcium Gluconate 20 meq/ Multivitamins 10 ml/Chromium/

Copper/Manganese/ Seleni/Zn 0.5 ml/ Total Parenteral Nutrition/Amino 

Acids/Dextrose/ Fat Emulsion Intravenous 1,512 ml @  63 mls/hr TPN  CONT IV  

Last administered on 3/20/20at 21:01;  Start 3/20/20 at 22:00;  Stop 3/21/20 at 

21:59;  Status DC


Potassium Chloride/Water 100 ml @  100 mls/hr 1X  ONCE IV  Last administered on 

3/20/20at 14:09;  Start 3/20/20 at 14:00;  Stop 3/20/20 at 14:59;  Status DC


Benzocaine (Hurricaine One) 1 spray 1X  ONCE MM  Last administered on 3/20/20at 

16:38;  Start 3/20/20 at 14:30;  Stop 3/20/20 at 14:31;  Status DC


Lidocaine HCl (Glydo (Lidocaine) Jelly) 1 thomas 1X  ONCE MM  Last administered on 

3/20/20at 16:38;  Start 3/20/20 at 14:30;  Stop 3/20/20 at 14:31;  Status DC


Linezolid/Dextrose 300 ml @  300 mls/hr Q12HR IV  Last administered on 3/26/20at

21:04;  Start 3/20/20 at 20:00;  Stop 3/27/20 at 07:50;  Status DC


Acetaminophen (Tylenol) 650 mg PRN Q6HRS  PRN PO MILD PAIN / TEMP;  Start 

3/21/20 at 03:30;  Stop 3/21/20 at 03:36;  Status DC


Acetaminophen (Tylenol) 650 mg PRN Q6HRS  PRN PEG MILD PAIN / TEMP Last 

administered on 4/16/20at 19:56;  Start 3/21/20 at 03:36


Sodium Chloride 1,000 ml @  1,000 mls/hr Q1H PRN IV hypotension;  Start 3/21/20 

at 07:50;  Stop 3/21/20 at 13:49;  Status DC


Albumin Human 200 ml @  200 mls/hr 1X PRN  PRN IV Hypotension;  Start 3/21/20 at

08:00;  Stop 3/21/20 at 13:59;  Status DC


Sodium Chloride (Normal Saline Flush) 10 ml 1X PRN  PRN IV AP catheter pack;  

Start 3/21/20 at 08:00;  Stop 3/22/20 at 07:59;  Status DC


Sodium Chloride (Normal Saline Flush) 10 ml 1X PRN  PRN IV  catheter pack;  

Start 3/21/20 at 08:00;  Stop 3/22/20 at 07:59;  Status DC


Sodium Chloride 1,000 ml @  400 mls/hr Q2H30M PRN IV PATENCY;  Start 3/21/20 at 

07:50;  Stop 3/21/20 at 19:49;  Status DC


Info (PHARMACY MONITORING -- do not chart) 1 each PRN DAILY  PRN MC SEE COMMENTS

;  Start 3/21/20 at 08:00;  Status UNV


Info (PHARMACY MONITORING -- do not chart) 1 each PRN DAILY  PRN MC SEE 

COMMENTS;  Start 3/21/20 at 08:00;  Stop 3/23/20 at 08:25;  Status DC


Sodium Chloride 90 meq/Potassium Chloride 15 meq/ Potassium Phosphate 10 mmol/ 

Magnesium Sulfate 10 meq/Calcium Gluconate 20 meq/ Multivitamins 10 ml/Chromium/

Copper/Manganese/ Seleni/Zn 0.5 ml/ Total Parenteral Nutrition/Amino 

Acids/Dextrose/ Fat Emulsion Intravenous 1,512 ml @  63 mls/hr TPN  CONT IV  

Last administered on 3/21/20at 20:57;  Start 3/21/20 at 22:00;  Stop 3/22/20 at 

21:59;  Status DC


Sodium Chloride 90 meq/Potassium Chloride 15 meq/ Potassium Phosphate 15 mmol/ 

Magnesium Sulfate 10 meq/Calcium Gluconate 20 meq/ Multivitamins 10 ml/Chromium/

Copper/Manganese/ Seleni/Zn 0.5 ml/ Total Parenteral Nutrition/Amino 

Acids/Dextrose/ Fat Emulsion Intravenous 1,512 ml @  63 mls/hr TPN  CONT IV ;  

Start 3/22/20 at 22:00;  Stop 3/22/20 at 14:16;  Status DC


Sodium Chloride 90 meq/Potassium Chloride 15 meq/ Potassium Phosphate 15 mmol/ 

Magnesium Sulfate 10 meq/Calcium Gluconate 20 meq/ Multivitamins 10 ml/Chromium/

Copper/Manganese/ Seleni/Zn 0.5 ml/ Total Parenteral Nutrition/Amino 

Acids/Dextrose/ Fat Emulsion Intravenous 1,200 ml @  50 mls/hr TPN  CONT IV ;  

Start 3/22/20 at 22:00;  Stop 3/22/20 at 14:17;  Status DC


Sodium Chloride 90 meq/Potassium Chloride 15 meq/ Potassium Phosphate 10 mmol/ 

Magnesium Sulfate 10 meq/Calcium Gluconate 20 meq/ Multivitamins 10 ml/Chromium/

Copper/Manganese/ Seleni/Zn 0.5 ml/ Total Parenteral Nutrition/Amino 

Acids/Dextrose/ Fat Emulsion Intravenous 1,200 ml @  50 mls/hr TPN  CONT IV  

Last administered on 3/22/20at 23:29;  Start 3/22/20 at 22:00;  Stop 3/23/20 at 

21:59;  Status DC


Sodium Chloride 1,000 ml @  1,000 mls/hr Q1H PRN IV hypotension;  Start 3/23/20 

at 07:28;  Stop 3/23/20 at 13:27;  Status DC


Albumin Human 200 ml @  200 mls/hr 1X  ONCE IV  Last administered on 3/23/20at 

08:51;  Start 3/23/20 at 07:30;  Stop 3/23/20 at 08:29;  Status DC


Diphenhydramine HCl (Benadryl) 25 mg 1X PRN  PRN IV ITCHING;  Start 3/23/20 at 

07:30;  Stop 3/24/20 at 07:29;  Status DC


Diphenhydramine HCl (Benadryl) 25 mg 1X PRN  PRN IV ITCHING;  Start 3/23/20 at 

07:30;  Stop 3/24/20 at 07:29;  Status DC


Sodium Chloride 1,000 ml @  400 mls/hr Q2H30M PRN IV PATENCY;  Start 3/23/20 at 

07:28;  Stop 3/23/20 at 19:27;  Status DC


Info (PHARMACY MONITORING -- do not chart) 1 each PRN DAILY  PRN MC SEE CO

MMENTS;  Start 3/23/20 at 07:30;  Stop 4/3/20 at 13:01;  Status DC


Metronidazole 100 ml @  100 mls/hr Q6HRS IV  Last administered on 4/8/20at 

06:26;  Start 3/23/20 at 08:30;  Stop 4/8/20 at 09:58;  Status DC


Micafungin Sodium 100 mg/Dextrose 100 ml @  100 mls/hr Q24H IV  Last 

administered on 4/30/20at 08:18;  Start 3/23/20 at 09:00;  Stop 4/30/20 at 

20:58;  Status DC


Propofol 0 ml @ As Directed STK-MED ONCE IV ;  Start 3/23/20 at 07:53;  Stop 

3/23/20 at 07:53;  Status DC


Etomidate (Amidate) 20 mg STK-MED ONCE IV ;  Start 3/23/20 at 07:53;  Stop 

3/23/20 at 07:54;  Status DC


Midazolam HCl (Versed) 5 mg STK-MED ONCE .ROUTE ;  Start 3/23/20 at 07:57;  Stop

3/23/20 at 07:57;  Status DC


Fentanyl Citrate 30 ml @ 0 mls/hr CONT  PRN IV SEE PROTOCOL Last administered on

4/17/20at 06:12;  Start 3/23/20 at 08:15;  Stop 4/17/20 at 09:19;  Status DC


Artificial Tears (Artificial Tears) 1 drop PRN Q1HR  PRN OU DRY EYE, 1st choice;

 Start 3/23/20 at 08:15;  Stop 4/29/20 at 05:31;  Status DC


Midazolam HCl 50 mg/Sodium Chloride 50 ml @ 0 mls/hr CONT  PRN IV SEE PROTOCOL 

Last administered on 3/26/20at 22:39;  Start 3/23/20 at 08:15;  Stop 3/28/20 at 

15:59;  Status DC


Etomidate (Amidate) 8 mg 1X  ONCE IV  Last administered on 3/23/20at 08:33;  

Start 3/23/20 at 08:30;  Stop 3/23/20 at 08:31;  Status DC


Succinylcholine Chloride (Anectine) 120 mg 1X  ONCE IV  Last administered on 

3/23/20at 08:34;  Start 3/23/20 at 08:30;  Stop 3/23/20 at 08:31;  Status DC


Midazolam HCl (Versed) 5 mg 1X  ONCE IV ;  Start 3/23/20 at 08:30;  Stop 3/23/20

at 08:31;  Status DC


Potassium Chloride 15 meq/ Bicarbonate Dialysis Soln w/ out KCl 5,007.5 ml  @ 

1,000 mls/ hr Q5H1M IV  Last administered on 3/24/20at 11:11;  Start 3/23/20 at 

12:00;  Stop 3/24/20 at 11:15;  Status DC


Potassium Chloride 15 meq/ Bicarbonate Dialysis Soln w/ out KCl 5,007.5 ml  @ 

1,000 mls/ hr Q5H1M IV  Last administered on 3/24/20at 11:12;  Start 3/23/20 at 

12:00;  Stop 3/24/20 at 11:17;  Status DC


Potassium Chloride 15 meq/ Bicarbonate Dialysis Soln w/ out KCl 5,007.5 ml  @ 

1,000 mls/ hr Q5H1M IV  Last administered on 3/24/20at 11:11;  Start 3/23/20 at 

12:00;  Stop 3/24/20 at 11:19;  Status DC


Sodium Chloride 90 meq/Potassium Chloride 15 meq/ Potassium Phosphate 10 mmol/ 

Magnesium Sulfate 10 meq/Calcium Gluconate 20 meq/ Multivitamins 10 ml/Chromium/

Copper/Manganese/ Seleni/Zn 0.5 ml/ Total Parenteral Nutrition/Amino 

Acids/Dextrose/ Fat Emulsion Intravenous 1,400 ml @  58.333 mls/ hr TPN  CONT IV

 Last administered on 3/23/20at 21:42;  Start 3/23/20 at 22:00;  Stop 3/24/20 at

21:59;  Status DC


Heparin Sodium (Porcine) (Heparin Sodium) 5,000 unit Q8HRS SQ  Last administered

on 3/28/20at 05:55;  Start 3/23/20 at 15:00;  Stop 3/28/20 at 13:28;  Status DC


Meropenem 500 mg/ Sodium Chloride 50 ml @  100 mls/hr Q6HRS IV  Last 

administered on 3/25/20at 06:00;  Start 3/24/20 at 09:00;  Stop 3/25/20 at 

07:29;  Status DC


Potassium Phosphate 20 mmol/ Sodium Chloride 106.6667 ml @  51.667 m... 1X  ONCE

IV  Last administered on 3/24/20at 11:22;  Start 3/24/20 at 10:15;  Stop 3/24/20

at 12:18;  Status DC


Acetaminophen (Tylenol Supp) 650 mg PRN Q6HRS  PRN MS MILD PAIN / TEMP > 100.3'F

Last administered on 5/5/20at 09:12;  Start 3/24/20 at 10:30


Potassium Chloride/Water 100 ml @  100 mls/hr Q1H IV  Last administered on 

3/24/20at 12:12;  Start 3/24/20 at 11:00;  Stop 3/24/20 at 12:59;  Status DC


Potassium Chloride 20 meq/ Bicarbonate Dialysis Soln w/ out KCl 5,010 ml @  

1,000 mls/hr Q5H1M IV  Last administered on 3/25/20at 08:48;  Start 3/24/20 at 

12:00;  Stop 3/25/20 at 13:03;  Status DC


Potassium Chloride 20 meq/ Bicarbonate Dialysis Soln w/ out KCl 5,010 ml @  

1,000 mls/hr Q5H1M IV  Last administered on 3/29/20at 14:52;  Start 3/24/20 at 

11:30;  Stop 3/29/20 at 19:59;  Status DC


Potassium Chloride 20 meq/ Bicarbonate Dialysis Soln w/ out KCl 5,010 ml @  1,0

00 mls/hr Q5H1M IV  Last administered on 3/29/20at 14:53;  Start 3/24/20 at 

11:30;  Stop 3/29/20 at 19:59;  Status DC


Sodium Chloride 90 meq/Potassium Chloride 15 meq/ Potassium Phosphate 15 mmol/ 

Magnesium Sulfate 10 meq/Calcium Gluconate 15 meq/ Multivitamins 10 ml/Chromium/

Copper/Manganese/ Seleni/Zn 0.5 ml/ Total Parenteral Nutrition/Amino 

Acids/Dextrose/ Fat Emulsion Intravenous 1,400 ml @  58.333 mls/ hr TPN  CONT IV

 Last administered on 3/24/20at 22:17;  Start 3/24/20 at 22:00;  Stop 3/25/20 at

21:59;  Status DC


Cefepime HCl (Maxipime) 2 gm Q12HR IVP  Last administered on 4/7/20at 20:56;  

Start 3/25/20 at 09:00;  Stop 4/8/20 at 09:58;  Status DC


Daptomycin 500 mg/ Sodium Chloride 50 ml @  100 mls/hr Q48H IV  Last 

administered on 4/10/20at 09:57;  Start 3/25/20 at 08:30;  Stop 4/10/20 at 

10:07;  Status DC


Lidocaine HCl (Buffered Lidocaine 1%) 3 ml 1X  ONCE INJ  Last administered on 

3/25/20at 10:27;  Start 3/25/20 at 10:30;  Stop 3/25/20 at 10:31;  Status DC


Potassium Phosphate 20 mmol/ Sodium Chloride 106.6667 ml @  51.667 m... 1X  ONCE

IV  Last administered on 3/25/20at 12:51;  Start 3/25/20 at 13:00;  Stop 3/25/20

at 15:03;  Status DC


Sodium Chloride 90 meq/Potassium Chloride 15 meq/ Potassium Phosphate 18 mmol/ 

Magnesium Sulfate 8 meq/Calcium Gluconate 15 meq/ Multivitamins 10 ml/Chromium/ 

Copper/Manganese/ Seleni/Zn 0.5 ml/ Total Parenteral Nutrition/Amino 

Acids/Dextrose/ Fat Emulsion Intravenous 1,400 ml @  58.333 mls/ hr TPN  CONT IV

 Last administered on 3/25/20at 22:16;  Start 3/25/20 at 22:00;  Stop 3/26/20 at

21:59;  Status DC


Potassium Chloride 20 meq/ Bicarbonate Dialysis Soln w/ out KCl 5,010 ml @  

1,000 mls/hr Q5H1M IV  Last administered on 3/29/20at 14:54;  Start 3/25/20 at 

16:00;  Stop 3/29/20 at 19:59;  Status DC


Multi-Ingred Cream/Lotion/Oil/ Oint (Artificial Tears Eye Ointment) 1 thomas PRN 

Q1HR  PRN OU DRY EYE, 2nd choice Last administered on 4/13/20at 08:19;  Start 

3/25/20 at 17:30


Sodium Chloride 90 meq/Potassium Chloride 15 meq/ Potassium Phosphate 18 mmol/ 

Magnesium Sulfate 8 meq/Calcium Gluconate 15 meq/ Multivitamins 10 ml/Chromium/ 

Copper/Manganese/ Seleni/Zn 0.5 ml/ Total Parenteral Nutrition/Amino Acids/D

extrose/ Fat Emulsion Intravenous 1,400 ml @  58.333 mls/ hr TPN  CONT IV  Last 

administered on 3/26/20at 22:00;  Start 3/26/20 at 22:00;  Stop 3/27/20 at 

21:59;  Status DC


Albumin Human 500 ml @  125 mls/hr 1X  ONCE IV ;  Start 3/26/20 at 14:15;  Stop 

3/26/20 at 18:14;  Status DC


Sodium Chloride 90 meq/Potassium Chloride 15 meq/ Potassium Phosphate 18 mmol/ 

Magnesium Sulfate 8 meq/Calcium Gluconate 15 meq/ Multivitamins 10 ml/Chromium/ 

Copper/Manganese/ Seleni/Zn 0.5 ml/ Insulin Human Regular 10 unit/ Total 

Parenteral Nutrition/Amino Acids/Dextrose/ Fat Emulsion Intravenous 1,400 ml @  

58.333 mls/ hr TPN  CONT IV  Last administered on 3/27/20at 21:43;  Start 

3/27/20 at 22:00;  Stop 3/28/20 at 21:59;  Status DC


Lidocaine HCl (Buffered Lidocaine 1%) 3 ml STK-MED ONCE .ROUTE ;  Start 3/25/20 

at 10:00;  Stop 3/27/20 at 13:57;  Status DC


Midazolam HCl 100 mg/Sodium Chloride 100 ml @ 7 mls/hr CONT  PRN IV SEE PROTOCOL

Last administered on 4/8/20at 15:35;  Start 3/28/20 at 16:00


Sodium Chloride 90 meq/Potassium Chloride 15 meq/ Potassium Phosphate 18 mmol/ 

Magnesium Sulfate 8 meq/Calcium Gluconate 15 meq/ Multivitamins 10 ml/Chromium/ 

Copper/Manganese/ Seleni/Zn 0.5 ml/ Insulin Human Regular 15 unit/ Total 

Parenteral Nutrition/Amino Acids/Dextrose/ Fat Emulsion Intravenous 1,400 ml @  

58.333 mls/ hr TPN  CONT IV  Last administered on 3/28/20at 20:34;  Start 

3/28/20 at 22:00;  Stop 3/29/20 at 21:59;  Status DC


Info ([facility] Electrolyte Protocol) 1 ea CONT PRN  PRN MC PER PROTOCOL;  Start 

3/29/20 at 13:15


Sodium Chloride 90 meq/Potassium Chloride 15 meq/ Potassium Phosphate 18 mmol/ 

Magnesium Sulfate 8 meq/Calcium Gluconate 15 meq/ Multivitamins 10 ml/Chromium/ 

Copper/Manganese/ Seleni/Zn 0.5 ml/ Insulin Human Regular 15 unit/ Total 

Parenteral Nutrition/Amino Acids/Dextrose/ Fat Emulsion Intravenous 1,400 ml @  

58.333 mls/ hr TPN  CONT IV  Last administered on 3/29/20at 22:05;  Start 

3/29/20 at 22:00;  Stop 3/30/20 at 21:59;  Status DC


Potassium Chloride 15 meq/ Bicarbonate Dialysis Soln w/ out KCl 5,007.5 ml  @ 

1,000 mls/ hr Q5H1M IV  Last administered on 4/1/20at 18:14;  Start 3/29/20 at 

20:00;  Stop 4/2/20 at 13:08;  Status DC


Potassium Chloride 15 meq/ Bicarbonate Dialysis Soln w/ out KCl 5,007.5 ml  @ 

1,000 mls/ hr Q5H1M IV  Last administered on 4/1/20at 18:14;  Start 3/29/20 at 

20:00;  Stop 4/2/20 at 13:08;  Status DC


Potassium Chloride 15 meq/ Bicarbonate Dialysis Soln w/ out KCl 5,007.5 ml  @ 

1,000 mls/ hr Q5H1M IV  Last administered on 4/1/20at 18:14;  Start 3/29/20 at 

20:00;  Stop 4/2/20 at 13:08;  Status DC


Iohexol (Omnipaque 240 Mg/ml) 30 ml 1X  ONCE PO  Last administered on 3/30/20at 

11:30;  Start 3/30/20 at 11:30;  Stop 3/30/20 at 11:33;  Status DC


Info (CONTRAST GIVEN -- Rx MONITORING) 1 each PRN DAILY  PRN MC SEE COMMENTS;  

Start 3/30/20 at 11:45;  Stop 4/1/20 at 11:44;  Status DC


Sodium Chloride 90 meq/Potassium Chloride 15 meq/ Potassium Phosphate 18 mmol/ 

Magnesium Sulfate 8 meq/Calcium Gluconate 15 meq/ Multivitamins 10 ml/Chromium/ 

Copper/Manganese/ Seleni/Zn 0.5 ml/ Insulin Human Regular 15 unit/ Total Pare

nteral Nutrition/Amino Acids/Dextrose/ Fat Emulsion Intravenous 1,400 ml @  

58.333 mls/ hr TPN  CONT IV  Last administered on 3/30/20at 21:47;  Start 

3/30/20 at 22:00;  Stop 3/31/20 at 21:59;  Status DC


Sodium Chloride 90 meq/Potassium Chloride 15 meq/ Potassium Phosphate 18 mmol/ 

Magnesium Sulfate 8 meq/Calcium Gluconate 15 meq/ Multivitamins 10 ml/Chromium/ 

Copper/Manganese/ Seleni/Zn 0.5 ml/ Insulin Human Regular 20 unit/ Total 

Parenteral Nutrition/Amino Acids/Dextrose/ Fat Emulsion Intravenous 1,400 ml @  

58.333 mls/ hr TPN  CONT IV  Last administered on 3/31/20at 21:36;  Start 

3/31/20 at 22:00;  Stop 4/1/20 at 21:59;  Status DC


Alteplase, Recombinant (Cathflo For Central Catheter Clearance) 1 mg 1X  ONCE 

INT CAT  Last administered on 3/31/20at 20:03;  Start 3/31/20 at 19:30;  Stop 

3/31/20 at 19:46;  Status DC


Alteplase, Recombinant (Cathflo For Central Catheter Clearance) 1 mg 1X  ONCE 

INT CAT  Last administered on 3/31/20at 22:05;  Start 3/31/20 at 22:00;  Stop 

3/31/20 at 22:01;  Status DC


Sodium Chloride 90 meq/Potassium Chloride 15 meq/ Potassium Phosphate 18 mmol/ 

Magnesium Sulfate 8 meq/Calcium Gluconate 15 meq/ Multivitamins 10 ml/Chromium/ 

Copper/Manganese/ Seleni/Zn 0.5 ml/ Insulin Human Regular 20 unit/ Total 

Parenteral Nutrition/Amino Acids/Dextrose/ Fat Emulsion Intravenous 1,400 ml @  

58.333 mls/ hr TPN  CONT IV  Last administered on 4/1/20at 21:30;  Start 4/1/20 

at 22:00;  Stop 4/2/20 at 21:59;  Status DC


Dexmedetomidine HCl 400 mcg/ Sodium Chloride 100 ml @ 0 mls/hr CONT  PRN IV 

ANXIETY / AGITATION Last administered on 5/8/20at 11:32;  Start 4/2/20 at 08:15


Sodium Chloride 500 ml @  500 mls/hr 1X PRN  PRN IV ELEVATED BP, SEE COMMENTS;  

Start 4/2/20 at 08:15


Atropine Sulfate (ATROPINE 0.5mg SYRINGE) 0.5 mg PRN Q5MIN  PRN IV SEE COMMENTS;

 Start 4/2/20 at 08:15


Furosemide (Lasix) 20 mg 1X  ONCE IVP  Last administered on 4/2/20at 08:19;  

Start 4/2/20 at 08:15;  Stop 4/2/20 at 08:16;  Status DC


Lidocaine HCl (Buffered Lidocaine 1%) 3 ml STK-MED ONCE .ROUTE ;  Start 4/2/20 

at 08:39;  Stop 4/2/20 at 08:39;  Status DC


Lidocaine HCl (Buffered Lidocaine 1%) 6 ml 1X  ONCE INJ  Last administered on 

4/2/20at 09:05;  Start 4/2/20 at 09:00;  Stop 4/2/20 at 09:06;  Status DC


Sodium Chloride 90 meq/Potassium Chloride 15 meq/ Potassium Phosphate 18 mmol/ 

Magnesium Sulfate 8 meq/Calcium Gluconate 15 meq/ Multivitamins 10 ml/Chromium/ 

Copper/Manganese/ Seleni/Zn 0.5 ml/ Insulin Human Regular 20 unit/ Total 

Parenteral Nutrition/Amino Acids/Dextrose/ Fat Emulsion Intravenous 1,400 ml @  

58.333 mls/ hr TPN  CONT IV  Last administered on 4/2/20at 22:45;  Start 4/2/20 

at 22:00;  Stop 4/3/20 at 21:59;  Status DC


Sodium Chloride 1,000 ml @  1,000 mls/hr Q1H PRN IV hypotension;  Start 4/3/20 

at 07:30;  Stop 4/3/20 at 13:29;  Status DC


Albumin Human 200 ml @  200 mls/hr 1X PRN  PRN IV Hypotension Last administered 

on 4/3/20at 09:36;  Start 4/3/20 at 07:30;  Stop 4/3/20 at 13:29;  Status DC


Sodium Chloride (Normal Saline Flush) 10 ml 1X PRN  PRN IV AP catheter pack;  

Start 4/3/20 at 07:30;  Stop 4/3/20 at 21:29;  Status DC


Sodium Chloride (Normal Saline Flush) 10 ml 1X PRN  PRN IV  catheter pack;  

Start 4/3/20 at 07:30;  Stop 4/4/20 at 07:29;  Status DC


Sodium Chloride 1,000 ml @  400 mls/hr Q2H30M PRN IV PATENCY;  Start 4/3/20 at 

07:30;  Stop 4/3/20 at 19:29;  Status DC


Info (PHARMACY MONITORING -- do not chart) 1 each PRN DAILY  PRN MC SEE 

COMMENTS;  Start 4/3/20 at 07:30;  Stop 4/3/20 at 13:02;  Status DC


Info (PHARMACY MONITORING -- do not chart) 1 each PRN DAILY  PRN MC SEE 

COMMENTS;  Start 4/3/20 at 07:30;  Stop 4/5/20 at 12:45;  Status DC


Sodium Chloride 90 meq/Potassium Chloride 15 meq/ Potassium Phosphate 10 mmol/ 

Magnesium Sulfate 8 meq/Calcium Gluconate 15 meq/ Multivitamins 10 ml/Chromium/ 

Copper/Manganese/ Seleni/Zn 0.5 ml/ Insulin Human Regular 25 unit/ Total Pare

nteral Nutrition/Amino Acids/Dextrose/ Fat Emulsion Intravenous 1,400 ml @  

58.333 mls/ hr TPN  CONT IV  Last administered on 4/3/20at 22:19;  Start 4/3/20 

at 22:00;  Stop 4/4/20 at 21:59;  Status DC


Heparin Sodium (Porcine) (Heparin Sodium) 5,000 unit Q12HR SQ  Last administered

on 4/26/20at 08:59;  Start 4/3/20 at 21:00;  Stop 4/26/20 at 10:05;  Status DC


Ondansetron HCl (Zofran) 4 mg PRN Q6HRS  PRN IV NAUSEA/VOMITING;  Start 4/6/20 

at 07:00;  Stop 4/7/20 at 06:59;  Status DC


Fentanyl Citrate (Fentanyl 2ml Vial) 25 mcg PRN Q5MIN  PRN IV MILD PAIN 1-3;  

Start 4/6/20 at 07:00;  Stop 4/7/20 at 06:59;  Status DC


Fentanyl Citrate (Fentanyl 2ml Vial) 50 mcg PRN Q5MIN  PRN IV MODERATE TO SEVERE

PAIN;  Start 4/6/20 at 07:00;  Stop 4/7/20 at 06:59;  Status DC


Ringer's Solution 1,000 ml @  30 mls/hr Q24H IV ;  Start 4/6/20 at 07:00;  Stop 

4/6/20 at 18:59;  Status DC


Lidocaine HCl (Xylocaine-Mpf 1% 2ml Vial) 2 ml PRN 1X  PRN ID PRIOR TO IV START;

 Start 4/6/20 at 07:00;  Stop 4/7/20 at 06:59;  Status DC


Prochlorperazine Edisylate (Compazine) 5 mg PACU PRN  PRN IV NAUSEA, MRX1;  

Start 4/6/20 at 07:00;  Stop 4/7/20 at 06:59;  Status DC


Sodium Chloride 1,000 ml @  1,000 mls/hr Q1H PRN IV hypotension;  Start 4/4/20 

at 09:10;  Stop 4/4/20 at 15:09;  Status DC


Albumin Human 200 ml @  200 mls/hr 1X PRN  PRN IV Hypotension Last administered 

on 4/4/20at 10:10;  Start 4/4/20 at 09:15;  Stop 4/4/20 at 15:14;  Status DC


Sodium Chloride 1,000 ml @  400 mls/hr Q2H30M PRN IV PATENCY;  Start 4/4/20 at 

09:10;  Stop 4/4/20 at 21:09;  Status DC


Info (PHARMACY MONITORING -- do not chart) 1 each PRN DAILY  PRN MC SEE 

COMMENTS;  Start 4/4/20 at 09:15;  Stop 4/5/20 at 12:45;  Status DC


Info (PHARMACY MONITORING -- do not chart) 1 each PRN DAILY  PRN MC SEE 

COMMENTS;  Start 4/4/20 at 09:15;  Stop 4/5/20 at 12:45;  Status DC


Sodium Chloride 90 meq/Potassium Chloride 15 meq/ Potassium Phosphate 10 mmol/ 

Magnesium Sulfate 8 meq/Calcium Gluconate 15 meq/ Multivitamins 10 ml/Chromium/ 

Copper/Manganese/ Seleni/Zn 0.5 ml/ Insulin Human Regular 25 unit/ Total 

Parenteral Nutrition/Amino Acids/Dextrose/ Fat Emulsion Intravenous 1,400 ml @  

58.333 mls/ hr TPN  CONT IV  Last administered on 4/4/20at 22:10;  Start 4/4/20 

at 22:00;  Stop 4/5/20 at 21:59;  Status DC


Magnesium Sulfate 50 ml @ 25 mls/hr PRN DAILY  PRN IV for Mag < 1.7 on am labs 

Last administered on 4/20/20at 17:27;  Start 4/5/20 at 09:15


Sodium Chloride 90 meq/Potassium Chloride 15 meq/ Potassium Phosphate 10 mmol/ 

Magnesium Sulfate 8 meq/Calcium Gluconate 15 meq/ Multivitamins 10 ml/Chromium/ 

Copper/Manganese/ Seleni/Zn 0.5 ml/ Insulin Human Regular 25 unit/ Total 

Parenteral Nutrition/Amino Acids/Dextrose/ Fat Emulsion Intravenous 1,400 ml @  

58.333 mls/ hr TPN  CONT IV  Last administered on 4/5/20at 21:20;  Start 4/5/20 

at 22:00;  Stop 4/6/20 at 21:59;  Status DC


Sodium Chloride 1,000 ml @  1,000 mls/hr Q1H PRN IV hypotension;  Start 4/5/20 

at 12:23;  Stop 4/5/20 at 18:22;  Status DC


Albumin Human 200 ml @  200 mls/hr 1X  ONCE IV  Last administered on 4/5/20at 

13:34;  Start 4/5/20 at 12:30;  Stop 4/5/20 at 13:29;  Status DC


Diphenhydramine HCl (Benadryl) 25 mg 1X PRN  PRN IV ITCHING;  Start 4/5/20 at 

12:30;  Stop 4/6/20 at 12:29;  Status DC


Diphenhydramine HCl (Benadryl) 25 mg 1X PRN  PRN IV ITCHING;  Start 4/5/20 at 

12:30;  Stop 4/6/20 at 12:29;  Status DC


Info (PHARMACY MONITORING -- do not chart) 1 each PRN DAILY  PRN MC SEE 

COMMENTS;  Start 4/5/20 at 12:30;  Status Cancel


Bupivacaine HCl/ Epinephrine Bitart (Sensorcain-Epi 0.5%-1:[id number] Mpf) 30 ml 

STK-MED ONCE .ROUTE  Last administered on 4/6/20at 11:44;  Start 4/6/20 at 

11:00;  Stop 4/6/20 at 11:01;  Status DC


Cellulose (Surgicel Fibrillar 1x2) 1 each STK-MED ONCE .ROUTE ;  Start 4/6/20 at

11:00;  Stop 4/6/20 at 11:01;  Status DC


Sodium Chloride 90 meq/Potassium Chloride 15 meq/ Potassium Phosphate 10 mmol/ 

Magnesium Sulfate 12 meq/Calcium Gluconate 15 meq/ Multivitamins 10 ml/Chromium/

Copper/Manganese/ Seleni/Zn 0.5 ml/ Insulin Human Regular 25 unit/ Total 

Parenteral Nutrition/Amino Acids/Dextrose/ Fat Emulsion Intravenous 1,400 ml @  

58.333 mls/ hr TPN  CONT IV  Last administered on 4/6/20at 22:24;  Start 4/6/20 

at 22:00;  Stop 4/7/20 at 21:59;  Status DC


Propofol 20 ml @ As Directed STK-MED ONCE IV ;  Start 4/6/20 at 11:07;  Stop 

4/6/20 at 11:07;  Status DC


Cellulose (Surgicel Hemostat 4x8) 1 each STK-MED ONCE .ROUTE  Last administered 

on 4/6/20at 11:44;  Start 4/6/20 at 11:55;  Stop 4/6/20 at 11:56;  Status DC


Sevoflurane (Ultane) 60 ml STK-MED ONCE IH ;  Start 4/6/20 at 12:46;  Stop 

4/6/20 at 12:46;  Status DC


Sodium Chloride 1,000 ml @  1,000 mls/hr Q1H PRN IV hypotension;  Start 4/6/20 

at 13:51;  Stop 4/6/20 at 19:50;  Status DC


Albumin Human 200 ml @  200 mls/hr 1X PRN  PRN IV Hypotension Last administered 

on 4/6/20at 14:51;  Start 4/6/20 at 14:00;  Stop 4/6/20 at 19:59;  Status DC


Diphenhydramine HCl (Benadryl) 25 mg 1X PRN  PRN IV ITCHING;  Start 4/6/20 at 

14:00;  Stop 4/7/20 at 13:59;  Status DC


Diphenhydramine HCl (Benadryl) 25 mg 1X PRN  PRN IV ITCHING;  Start 4/6/20 at 

14:00;  Stop 4/7/20 at 13:59;  Status DC


Sodium Chloride 1,000 ml @  400 mls/hr Q2H30M PRN IV PATENCY;  Start 4/6/20 at 

13:51;  Stop 4/7/20 at 01:50;  Status DC


Info (PHARMACY MONITORING -- do not chart) 1 each PRN DAILY  PRN MC SEE 

COMMENTS;  Start 4/6/20 at 14:00;  Stop 4/9/20 at 08:16;  Status DC


Heparin Sodium (Porcine) (Hep Lock Adult) 500 unit STK-MED ONCE IVP ;  Start 

4/7/20 at 09:29;  Stop 4/7/20 at 09:30;  Status DC


Sodium Chloride 1,000 ml @  1,000 mls/hr Q1H PRN IV hypotension;  Start 4/7/20 

at 10:43;  Stop 4/7/20 at 16:42;  Status DC


Sodium Chloride 1,000 ml @  400 mls/hr Q2H30M PRN IV PATENCY;  Start 4/7/20 at 

10:43;  Stop 4/7/20 at 22:42;  Status DC


Info (PHARMACY MONITORING -- do not chart) 1 each PRN DAILY  PRN MC SEE 

COMMENTS;  Start 4/7/20 at 10:45;  Status UNV


Info (PHARMACY MONITORING -- do not chart) 1 each PRN DAILY  PRN MC SEE 

COMMENTS;  Start 4/7/20 at 10:45;  Status UNV


Sodium Chloride 90 meq/Potassium Chloride 15 meq/ Magnesium Sulfate 12 

meq/Calcium Gluconate 15 meq/ Multivitamins 10 ml/Chromium/ Copper/Manganese/ 

Seleni/Zn 0.5 ml/ Insulin Human Regular 25 unit/ Total Parenteral 

Nutrition/Amino Acids/Dextrose/ Fat Emulsion Intravenous 1,400 ml @  58.333 mls/

hr TPN  CONT IV  Last administered on 4/7/20at 22:13;  Start 4/7/20 at 22:00;  

Stop 4/8/20 at 21:59;  Status DC


Sodium Chloride 1,000 ml @  1,000 mls/hr Q1H PRN IV hypotension;  Start 4/8/20 

at 07:50;  Stop 4/8/20 at 13:49;  Status DC


Albumin Human 200 ml @  200 mls/hr 1X  ONCE IV ;  Start 4/8/20 at 08:00;  Stop 

4/8/20 at 08:53;  Status DC


Diphenhydramine HCl (Benadryl) 25 mg 1X PRN  PRN IV ITCHING;  Start 4/8/20 at 

08:00;  Stop 4/9/20 at 07:59;  Status DC


Diphenhydramine HCl (Benadryl) 25 mg 1X PRN  PRN IV ITCHING;  Start 4/8/20 at 

08:00;  Stop 4/9/20 at 07:59;  Status DC


Info (PHARMACY MONITORING -- do not chart) 1 each PRN DAILY  PRN MC SEE CO

MMENTS;  Start 4/8/20 at 08:00;  Stop 4/9/20 at 08:16;  Status DC


Albumin Human 50 ml @ 50 mls/hr 1X  ONCE IV ;  Start 4/8/20 at 08:53;  Stop 

4/8/20 at 08:56;  Status DC


Albumin Human 200 ml @  50 mls/hr PRN 1X  PRN IV HYPOTENSION Last administered 

on 4/14/20at 11:54;  Start 4/8/20 at 09:00


Meropenem 500 mg/ Sodium Chloride 50 ml @  100 mls/hr Q12H IV  Last administered

on 4/28/20at 10:45;  Start 4/8/20 at 10:00;  Stop 4/28/20 at 12:37;  Status DC


Sodium Chloride 90 meq/Magnesium Sulfate 12 meq/ Calcium Gluconate 15 meq/ 

Multivitamins 10 ml/Chromium/ Copper/Manganese/ Seleni/Zn 0.5 ml/ Insulin Human 

Regular 25 unit/ Total Parenteral Nutrition/Amino Acids/Dextrose/ Fat Emulsion 

Intravenous 1,400 ml @  58.333 mls/ hr TPN  CONT IV  Last administered on 

4/8/20at 21:41;  Start 4/8/20 at 22:00;  Stop 4/9/20 at 21:59;  Status DC


Sodium Chloride 1,000 ml @  1,000 mls/hr Q1H PRN IV hypotension;  Start 4/9/20 

at 07:58;  Stop 4/9/20 at 13:57;  Status DC


Albumin Human 200 ml @  200 mls/hr 1X PRN  PRN IV Hypotension Last administered 

on 4/9/20at 09:30;  Start 4/9/20 at 08:00;  Stop 4/9/20 at 13:59;  Status DC


Sodium Chloride 1,000 ml @  400 mls/hr Q2H30M PRN IV PATENCY;  Start 4/9/20 at 

07:58;  Stop 4/9/20 at 19:57;  Status DC


Info (PHARMACY MONITORING -- do not chart) 1 each PRN DAILY  PRN MC SEE 

COMMENTS;  Start 4/9/20 at 08:00;  Status Cancel


Info (PHARMACY MONITORING -- do not chart) 1 each PRN DAILY  PRN MC SEE 

COMMENTS;  Start 4/9/20 at 08:15;  Status UNV


Sodium Chloride 90 meq/Potassium Phosphate 5 mmol/ Magnesium Sulfate 12 

meq/Calcium Gluconate 15 meq/ Multivitamins 10 ml/Chromium/ Copper/Manganese/ 

Seleni/Zn 0.5 ml/ Insulin Human Regular 30 unit/ Total Parenteral 

Nutrition/Amino Acids/Dextrose/ Fat Emulsion Intravenous 1,400 ml @  58.333 mls/

hr TPN  CONT IV  Last administered on 4/9/20at 22:08;  Start 4/9/20 at 22:00;  

Stop 4/10/20 at 21:59;  Status DC


Linezolid/Dextrose 300 ml @  300 mls/hr Q12HR IV  Last administered on 4/20/20at

20:40;  Start 4/10/20 at 11:00;  Stop 4/21/20 at 08:10;  Status DC


Sodium Chloride 90 meq/Potassium Phosphate 15 mmol/ Magnesium Sulfate 12 

meq/Calcium Gluconate 15 meq/ Multivitamins 10 ml/Chromium/ Copper/Manganese/ 

Seleni/Zn 0.5 ml/ Insulin Human Regular 30 unit/ Total Parenteral 

Nutrition/Amino Acids/Dextrose/ Fat Emulsion Intravenous 1,400 ml @  58.333 mls/

hr TPN  CONT IV  Last administered on 4/10/20at 21:49;  Start 4/10/20 at 22:00; 

Stop 4/11/20 at 21:59;  Status DC


Sodium Chloride 90 meq/Potassium Phosphate 15 mmol/ Magnesium Sulfate 12 

meq/Calcium Gluconate 15 meq/ Multivitamins 10 ml/Chromium/ Copper/Manganese/ 

Seleni/Zn 0.5 ml/ Insulin Human Regular 40 unit/ Total Parenteral 

Nutrition/Amino Acids/Dextrose/ Fat Emulsion Intravenous 1,400 ml @  58.333 mls/

hr TPN  CONT IV  Last administered on 4/11/20at 21:21;  Start 4/11/20 at 22:00; 

Stop 4/12/20 at 21:59;  Status DC


Sodium Chloride 1,000 ml @  1,000 mls/hr Q1H PRN IV hypotension;  Start 4/11/20 

at 13:26;  Stop 4/11/20 at 19:25;  Status DC


Albumin Human 200 ml @  200 mls/hr 1X PRN  PRN IV Hypotension Last administered 

on 4/11/20at 15:00;  Start 4/11/20 at 13:30;  Stop 4/11/20 at 19:29;  Status DC


Sodium Chloride (Normal Saline Flush) 10 ml 1X PRN  PRN IV AP catheter pack;  

Start 4/11/20 at 13:30;  Stop 4/12/20 at 13:29;  Status DC


Sodium Chloride (Normal Saline Flush) 10 ml 1X PRN  PRN IV  catheter pack;  

Start 4/11/20 at 13:30;  Stop 4/12/20 at 13:29;  Status DC


Sodium Chloride 1,000 ml @  400 mls/hr Q2H30M PRN IV PATENCY;  Start 4/11/20 at 

13:26;  Stop 4/12/20 at 01:25;  Status DC


Info (PHARMACY MONITORING -- do not chart) 1 each PRN DAILY  PRN MC SEE 

COMMENTS;  Start 4/11/20 at 13:30;  Stop 4/11/20 at 13:33;  Status DC


Info (PHARMACY MONITORING -- do not chart) 1 each PRN DAILY  PRN MC SEE 

COMMENTS;  Start 4/11/20 at 13:30;  Stop 4/11/20 at 13:34;  Status DC


Sodium Chloride 90 meq/Potassium Phosphate 19 mmol/ Magnesium Sulfate 12 

meq/Calcium Gluconate 15 meq/ Multivitamins 10 ml/Chromium/ Copper/Manganese/ 

Seleni/Zn 0.5 ml/ Insulin Human Regular 40 unit/ Total Parenteral 

Nutrition/Amino Acids/Dextrose/ Fat Emulsion Intravenous 1,400 ml @  58.333 mls/

hr TPN  CONT IV  Last administered on 4/12/20at 21:54;  Start 4/12/20 at 22:00; 

Stop 4/13/20 at 21:59;  Status DC


Sodium Chloride 1,000 ml @  1,000 mls/hr Q1H PRN IV hypotension;  Start 4/13/20 

at 09:35;  Stop 4/13/20 at 15:34;  Status DC


Albumin Human 200 ml @  200 mls/hr 1X PRN  PRN IV Hypotension;  Start 4/13/20 at

09:45;  Stop 4/13/20 at 15:44;  Status DC


Diphenhydramine HCl (Benadryl) 25 mg 1X PRN  PRN IV ITCHING;  Start 4/13/20 at 

09:45;  Stop 4/14/20 at 09:44;  Status DC


Diphenhydramine HCl (Benadryl) 25 mg 1X PRN  PRN IV ITCHING;  Start 4/13/20 at 

09:45;  Stop 4/14/20 at 09:44;  Status DC


Sodium Chloride 1,000 ml @  400 mls/hr Q2H30M PRN IV PATENCY;  Start 4/13/20 at 

09:35;  Stop 4/13/20 at 21:34;  Status DC


Info (PHARMACY MONITORING -- do not chart) 1 each PRN DAILY  PRN MC SEE 

COMMENTS;  Start 4/13/20 at 09:45;  Status Cancel


Sodium Chloride 100 meq/Potassium Phosphate 19 mmol/ Magnesium Sulfate 12 

meq/Calcium Gluconate 15 meq/ Multivitamins 10 ml/Chromium/ Copper/Manganese/ 

Seleni/Zn 0.5 ml/ Insulin Human Regular 40 unit/ Potassium Chloride 20 meq/ 

Total Parenteral Nutrition/Amino Acids/Dextrose/ Fat Emulsion Intravenous 1,400 

ml @  58.333 mls/ hr TPN  CONT IV  Last administered on 4/13/20at 22:02;  Start 

4/13/20 at 22:00;  Stop 4/14/20 at 21:59;  Status DC


Furosemide (Lasix) 40 mg 1X  ONCE IVP  Last administered on 4/13/20at 14:39;  

Start 4/13/20 at 14:30;  Stop 4/13/20 at 14:31;  Status DC


Metronidazole 100 ml @  100 mls/hr Q8HRS IV  Last administered on 4/21/20at 

06:04;  Start 4/14/20 at 10:00;  Stop 4/21/20 at 08:10;  Status DC


Sodium Chloride 1,000 ml @  1,000 mls/hr Q1H PRN IV hypotension;  Start 4/14/20 

at 08:00;  Stop 4/14/20 at 13:59;  Status DC


Albumin Human 200 ml @  200 mls/hr 1X PRN  PRN IV Hypotension;  Start 4/14/20 at

08:00;  Stop 4/14/20 at 13:59;  Status DC


Sodium Chloride 1,000 ml @  400 mls/hr Q2H30M PRN IV PATENCY;  Start 4/14/20 at 

08:00;  Stop 4/14/20 at 19:59;  Status DC


Info (PHARMACY MONITORING -- do not chart) 1 each PRN DAILY  PRN MC SEE 

COMMENTS;  Start 4/14/20 at 11:30;  Status UNV


Info (PHARMACY MONITORING -- do not chart) 1 each PRN DAILY  PRN MC SEE 

COMMENTS;  Start 4/14/20 at 11:30;  Stop 4/16/20 at 12:13;  Status DC


Sodium Chloride 100 meq/Potassium Phosphate 19 mmol/ Magnesium Sulfate 12 meq/C

alcium Gluconate 15 meq/ Multivitamins 10 ml/Chromium/ Copper/Manganese/ 

Seleni/Zn 0.5 ml/ Insulin Human Regular 40 unit/ Potassium Chloride 20 meq/ 

Total Parenteral Nutrition/Amino Acids/Dextrose/ Fat Emulsion Intravenous 1,400 

ml @  58.333 mls/ hr TPN  CONT IV  Last administered on 4/14/20at 21:52;  Start 

4/14/20 at 22:00;  Stop 4/15/20 at 21:59;  Status DC


Sodium Chloride (Normal Saline Flush) 10 ml QSHIFT  PRN IV AFTER MEDS AND BLOOD 

DRAWS;  Start 4/14/20 at 15:00


Sodium Chloride (Normal Saline Flush) 10 ml PRN Q5MIN  PRN IV AFTER MEDS AND 

BLOOD DRAWS;  Start 4/14/20 at 15:00


Sodium Chloride (Normal Saline Flush) 20 ml PRN Q5MIN  PRN IV AFTER MEDS AND 

BLOOD DRAWS;  Start 4/14/20 at 15:00


Sodium Chloride 100 meq/Potassium Phosphate 19 mmol/ Magnesium Sulfate 12 

meq/Calcium Gluconate 15 meq/ Multivitamins 10 ml/Chromium/ Copper/Manganese/ 

Seleni/Zn 0.5 ml/ Insulin Human Regular 40 unit/ Potassium Chloride 20 meq/ 

Total Parenteral Nutrition/Amino Acids/Dextrose/ Fat Emulsion Intravenous 1,400 

ml @  58.333 mls/ hr TPN  CONT IV  Last administered on 4/15/20at 21:20;  Start 

4/15/20 at 22:00;  Stop 4/16/20 at 21:59;  Status DC


Lidocaine HCl (Buffered Lidocaine 1%) 3 ml STK-MED ONCE .ROUTE ;  Start 4/15/20 

at 13:16;  Stop 4/15/20 at 13:16;  Status DC


Lidocaine HCl (Buffered Lidocaine 1%) 6 ml 1X  ONCE INJ  Last administered on 

4/15/20at 13:45;  Start 4/15/20 at 13:30;  Stop 4/15/20 at 13:31;  Status DC


Albumin Human 100 ml @  100 mls/hr 1X  ONCE IV  Last administered on 4/15/20at 

15:41;  Start 4/15/20 at 15:00;  Stop 4/15/20 at 15:59;  Status DC


Albumin Human 50 ml @ 50 mls/hr 1X  ONCE IV  Last administered on 4/15/20at 

15:00;  Start 4/15/20 at 15:00;  Stop 4/15/20 at 15:59;  Status DC


Info (PHARMACY MONITORING -- do not chart) 1 each PRN DAILY  PRN MC SEE 

COMMENTS;  Start 4/16/20 at 11:30;  Status Cancel


Info (PHARMACY MONITORING -- do not chart) 1 each PRN DAILY  PRN MC SEE 

COMMENTS;  Start 4/16/20 at 11:30;  Status UNV


Sodium Chloride 100 meq/Potassium Phosphate 10 mmol/ Magnesium Sulfate 12 

meq/Calcium Gluconate 15 meq/ Multivitamins 10 ml/Chromium/ Copper/Manganese/ 

Seleni/Zn 0.5 ml/ Insulin Human Regular 35 unit/ Potassium Chloride 20 meq/ 

Total Parenteral Nutrition/Amino Acids/Dextrose/ Fat Emulsion Intravenous 1,400 

ml @  58.333 mls/ hr TPN  CONT IV  Last administered on 4/16/20at 22:10;  Start 

4/16/20 at 22:00;  Stop 4/17/20 at 21:59;  Status DC


Sodium Chloride 100 meq/Potassium Phosphate 5 mmol/ Magnesium Sulfate 12 

meq/Calcium Gluconate 15 meq/ Multivitamins 10 ml/Chromium/ Copper/Manganese/ 

Seleni/Zn 0.5 ml/ Insulin Human Regular 35 unit/ Potassium Chloride 20 meq/ 

Total Parenteral Nutrition/Amino Acids/Dextrose/ Fat Emulsion Intravenous 1,400 

ml @  58.333 mls/ hr TPN  CONT IV  Last administered on 4/17/20at 22:59;  Start 

4/17/20 at 22:00;  Stop 4/18/20 at 21:59;  Status DC


Sodium Chloride 1,000 ml @  1,000 mls/hr Q1H PRN IV hypotension;  Start 4/18/20 

at 08:27;  Stop 4/18/20 at 14:26;  Status DC


Albumin Human 200 ml @  200 mls/hr 1X PRN  PRN IV Hypotension Last administered 

on 4/18/20at 09:18;  Start 4/18/20 at 08:30;  Stop 4/18/20 at 14:29;  Status DC


Sodium Chloride 1,000 ml @  400 mls/hr Q2H30M PRN IV PATENCY;  Start 4/18/20 at 

08:27;  Stop 4/18/20 at 20:26;  Status DC


Info (PHARMACY MONITORING -- do not chart) 1 each PRN DAILY  PRN MC SEE 

COMMENTS;  Start 4/18/20 at 08:30;  Status Cancel


Info (PHARMACY MONITORING -- do not chart) 1 each PRN DAILY  PRN MC SEE 

COMMENTS;  Start 4/18/20 at 08:30;  Stop 4/26/20 at 13:10;  Status DC


Sodium Chloride 100 meq/Potassium Chloride 40 meq/ Magnesium Sulfate 15 meq/Calc

ium Gluconate 15 meq/ Multivitamins 10 ml/Chromium/ Copper/Manganese/ Seleni/Zn 

0.5 ml/ Insulin Human Regular 35 unit/ Total Parenteral Nutrition/Amino 

Acids/Dextrose/ Fat Emulsion Intravenous 1,400 ml @  58.333 mls/ hr TPN  CONT IV

 Last administered on 4/18/20at 22:00;  Start 4/18/20 at 22:00;  Stop 4/19/20 at

21:59;  Status DC


Potassium Chloride/Water 100 ml @  100 mls/hr 1X  ONCE IV  Last administered on 

4/18/20at 17:28;  Start 4/18/20 at 14:45;  Stop 4/18/20 at 15:44;  Status DC


Sodium Chloride 100 meq/Potassium Chloride 40 meq/ Magnesium Sulfate 15 

meq/Calcium Gluconate 15 meq/ Multivitamins 10 ml/Chromium/ Copper/Manganese/ S

haley/Zn 0.5 ml/ Insulin Human Regular 35 unit/ Total Parenteral Nutrition/Amino

Acids/Dextrose/ Fat Emulsion Intravenous 1,400 ml @  58.333 mls/ hr TPN  CONT IV

 Last administered on 4/19/20at 22:46;  Start 4/19/20 at 22:00;  Stop 4/20/20 at

21:59;  Status DC


Sodium Chloride 100 meq/Potassium Chloride 40 meq/ Magnesium Sulfate 20 

meq/Calcium Gluconate 15 meq/ Multivitamins 10 ml/Chromium/ Copper/Manganese/ 

Seleni/Zn 0.5 ml/ Insulin Human Regular 35 unit/ Total Parenteral 

Nutrition/Amino Acids/Dextrose/ Fat Emulsion Intravenous 1,400 ml @  58.333 mls/

hr TPN  CONT IV  Last administered on 4/20/20at 22:31;  Start 4/20/20 at 22:00; 

Stop 4/21/20 at 21:59;  Status DC


Fentanyl Citrate (Fentanyl 2ml Vial) 50 mcg PRN Q2HR  PRN IVP PAIN Last 

administered on 4/27/20at 13:32;  Start 4/20/20 at 21:00;  Stop 4/28/20 at 

12:53;  Status DC


Fentanyl Citrate (Fentanyl 2ml Vial) 25 mcg PRN Q2HR  PRN IVP PAIN;  Start 

4/20/20 at 21:00;  Stop 4/28/20 at 12:54;  Status DC


Enoxaparin Sodium (Lovenox 100mg Syringe) 100 mg Q12HR SQ ;  Start 4/21/20 at 

21:00;  Status UNV


Amino Acids/ Glycerin/ Electrolytes 1,000 ml @  75 mls/hr [id number] IV ;  Start 

4/20/20 at 21:15;  Status UNV


Sodium Chloride 1,000 ml @  1,000 mls/hr Q1H PRN IV hypotension;  Start 4/21/20 

at 07:56;  Stop 4/21/20 at 13:55;  Status DC


Albumin Human 200 ml @  200 mls/hr 1X PRN  PRN IV Hypotension Last administered 

on 4/21/20at 08:40;  Start 4/21/20 at 08:00;  Stop 4/21/20 at 13:59;  Status DC


Sodium Chloride 1,000 ml @  400 mls/hr Q2H30M PRN IV PATENCY;  Start 4/21/20 at 

07:56;  Stop 4/21/20 at 19:55;  Status DC


Info (PHARMACY MONITORING -- do not chart) 1 each PRN DAILY  PRN MC SEE 

COMMENTS;  Start 4/21/20 at 08:00;  Status UNV


Info (PHARMACY MONITORING -- do not chart) 1 each PRN DAILY  PRN MC SEE 

COMMENTS;  Start 4/21/20 at 08:00;  Status UNV


Daptomycin 430 mg/ Sodium Chloride 50 ml @  100 mls/hr Q24H IV  Last 

administered on 4/21/20at 12:35;  Start 4/21/20 at 09:00;  Stop 4/21/20 at 

12:49;  Status DC


Sodium Chloride 100 meq/Potassium Chloride 40 meq/ Magnesium Sulfate 20 

meq/Calcium Gluconate 15 meq/ Multivitamins 10 ml/Chromium/ Copper/Manganese/ 

Seleni/Zn 0.5 ml/ Insulin Human Regular 35 unit/ Total Parenteral 

Nutrition/Amino Acids/Dextrose/ Fat Emulsion Intravenous 1,400 ml @  58.333 mls/

hr TPN  CONT IV  Last administered on 4/21/20at 21:26;  Start 4/21/20 at 22:00; 

Stop 4/22/20 at 21:59;  Status DC


Daptomycin 430 mg/ Sodium Chloride 50 ml @  100 mls/hr Q48H IV ;  Start 4/23/20 

at 09:00;  Stop 4/22/20 at 11:55;  Status DC


Sodium Chloride 100 meq/Potassium Chloride 40 meq/ Magnesium Sulfate 20 

meq/Calcium Gluconate 15 meq/ Multivitamins 10 ml/Chromium/ Copper/Manganese/ 

Seleni/Zn 0.5 ml/ Insulin Human Regular 35 unit/ Total Parenteral 

Nutrition/Amino Acids/Dextrose/ Fat Emulsion Intravenous 1,400 ml @  58.333 mls/

hr TPN  CONT IV  Last administered on 4/22/20at 22:27;  Start 4/22/20 at 22:00; 

Stop 4/23/20 at 21:59;  Status DC


Daptomycin 430 mg/ Sodium Chloride 50 ml @  100 mls/hr Q24H IV  Last 

administered on 4/24/20at 15:07;  Start 4/22/20 at 13:00;  Stop 4/25/20 at 1

3:15;  Status DC


Sodium Chloride 100 meq/Potassium Chloride 40 meq/ Magnesium Sulfate 20 

meq/Calcium Gluconate 10 meq/ Multivitamins 10 ml/Chromium/ Copper/Manganese/ 

Seleni/Zn 0.5 ml/ Insulin Human Regular 35 unit/ Total Parenteral 

Nutrition/Amino Acids/Dextrose/ Fat Emulsion Intravenous 1,400 ml @  58.333 mls/

hr TPN  CONT IV  Last administered on 4/24/20at 00:06;  Start 4/23/20 at 22:00; 

Stop 4/24/20 at 21:59;  Status DC


Alteplase, Recombinant (Cathflo For Central Catheter Clearance) 1 mg 1X  ONCE 

INT CAT  Last administered on 4/24/20at 11:44;  Start 4/24/20 at 10:45;  Stop 

4/24/20 at 10:46;  Status DC


Ondansetron HCl (Zofran) 4 mg PRN Q6HRS  PRN IV NAUSEA/VOMITING;  Start 4/27/20 

at 07:00;  Stop 4/28/20 at 06:59;  Status DC


Fentanyl Citrate (Fentanyl 2ml Vial) 25 mcg PRN Q5MIN  PRN IV MILD PAIN 1-3;  

Start 4/27/20 at 07:00;  Stop 4/28/20 at 06:59;  Status DC


Fentanyl Citrate (Fentanyl 2ml Vial) 50 mcg PRN Q5MIN  PRN IV MODERATE TO SEVERE

PAIN Last administered on 4/27/20at 10:17;  Start 4/27/20 at 07:00;  Stop 

4/28/20 at 06:59;  Status DC


Ringer's Solution 1,000 ml @  30 mls/hr Q24H IV ;  Start 4/27/20 at 07:00;  Stop

4/27/20 at 18:59;  Status DC


Lidocaine HCl (Xylocaine-Mpf 1% 2ml Vial) 2 ml PRN 1X  PRN ID PRIOR TO IV START;

 Start 4/27/20 at 07:00;  Stop 4/28/20 at 06:59;  Status DC


Prochlorperazine Edisylate (Compazine) 5 mg PACU PRN  PRN IV NAUSEA, MRX1;  

Start 4/27/20 at 07:00;  Stop 4/28/20 at 06:59;  Status DC


Sodium Acetate 50 meq/Potassium Acetate 55 meq/ Magnesium Sulfate 20 meq/Calcium

Gluconate 10 meq/ Multivitamins 10 ml/Chromium/ Copper/Manganese/ Seleni/Zn 0.5 

ml/ Insulin Human Regular 35 unit/ Total Parenteral Nutrition/Amino 

Acids/Dextrose/ Fat Emulsion Intravenous 1,400 ml @  58.333 mls/ hr TPN  CONT IV

;  Start 4/24/20 at 22:00;  Stop 4/24/20 at 14:15;  Status DC


Sodium Acetate 50 meq/Potassium Acetate 55 meq/ Magnesium Sulfate 20 meq/Calcium

Gluconate 10 meq/ Multivitamins 10 ml/Chromium/ Copper/Manganese/ Seleni/Zn 0.5 

ml/ Insulin Human Regular 35 unit/ Total Parenteral Nutrition/Amino A

cids/Dextrose/ Fat Emulsion Intravenous 1,800 ml @  75 mls/hr TPN  CONT IV  Last

administered on 4/24/20at 22:38;  Start 4/24/20 at 22:00;  Stop 4/25/20 at 

21:59;  Status DC


Sodium Chloride 1,000 ml @  1,000 mls/hr Q1H PRN IV hypotension;  Start 4/24/20 

at 15:31;  Stop 4/24/20 at 21:30;  Status DC


Diphenhydramine HCl (Benadryl) 25 mg 1X PRN  PRN IV ITCHING;  Start 4/24/20 at 

15:45;  Stop 4/25/20 at 15:44;  Status DC


Diphenhydramine HCl (Benadryl) 25 mg 1X PRN  PRN IV ITCHING;  Start 4/24/20 at 

15:45;  Stop 4/25/20 at 15:44;  Status DC


Sodium Chloride 1,000 ml @  400 mls/hr Q2H30M PRN IV PATENCY;  Start 4/24/20 at 

15:31;  Stop 4/25/20 at 03:30;  Status DC


Info (PHARMACY MONITORING -- do not chart) 1 each PRN DAILY  PRN MC SEE 

COMMENTS;  Start 4/24/20 at 15:45


Sodium Acetate 50 meq/Potassium Acetate 55 meq/ Magnesium Sulfate 20 meq/Calcium

Gluconate 10 meq/ Multivitamins 10 ml/Chromium/ Copper/Manganese/ Seleni/Zn 0.5 

ml/ Insulin Human Regular 35 unit/ Total Parenteral Nutrition/Amino 

Acids/Dextrose/ Fat Emulsion Intravenous 1,800 ml @  75 mls/hr TPN  CONT IV  

Last administered on 4/25/20at 22:03;  Start 4/25/20 at 22:00;  Stop 4/26/20 at 

21:59;  Status DC


Daptomycin 430 mg/ Sodium Chloride 50 ml @  100 mls/hr Q24H IV  Last 

administered on 4/30/20at 13:00;  Start 4/25/20 at 13:00;  Stop 4/30/20 at 

20:58;  Status DC


Heparin Sodium (Porcine) 1000 unit/Sodium Chloride 1,001 ml @  1,001 mls/hr 1X  

ONCE IRR ;  Start 4/27/20 at 06:00;  Stop 4/27/20 at 06:59;  Status DC


Potassium Acetate 55 meq/Magnesium Sulfate 20 meq/ Calcium Gluconate 10 meq/ 

Multivitamins 10 ml/Chromium/ Copper/Manganese/ Seleni/Zn 0.5 ml/ Insulin Human 

Regular 35 unit/ Total Parenteral Nutrition/Amino Acids/Dextrose/ Fat Emulsion 

Intravenous 1,920 ml @  80 mls/hr TPN  CONT IV  Last administered on 4/26/20at 

22:10;  Start 4/26/20 at 22:00;  Stop 4/27/20 at 21:59;  Status DC


Dexamethasone Sodium Phosphate (Decadron) 4 mg STK-MED ONCE .ROUTE ;  Start 

4/27/20 at 10:56;  Stop 4/27/20 at 10:57;  Status DC


Ondansetron HCl (Zofran) 4 mg STK-MED ONCE .ROUTE ;  Start 4/27/20 at 10:56;  

Stop 4/27/20 at 10:57;  Status DC


Rocuronium Bromide (Zemuron) 50 mg STK-MED ONCE .ROUTE ;  Start 4/27/20 at 

10:56;  Stop 4/27/20 at 10:57;  Status DC


Fentanyl Citrate (Fentanyl 2ml Vial) 100 mcg STK-MED ONCE .ROUTE ;  Start 4/27 /20 at 10:56;  Stop 4/27/20 at 10:57;  Status DC


Bupivacaine HCl/ Epinephrine Bitart (Sensorcain-Epi 0.5%-1:415026 Mpf) 30 ml ST

K-MED ONCE .ROUTE  Last administered on 4/27/20at 12:01;  Start 4/27/20 at 

10:58;  Stop 4/27/20 at 10:58;  Status DC


Cellulose (Surgicel Hemostat 2x14) 1 each STK-MED ONCE .ROUTE ;  Start 4/27/20 

at 10:58;  Stop 4/27/20 at 10:59;  Status DC


Iohexol (Omnipaque 300 Mg/ml) 50 ml STK-MED ONCE .ROUTE ;  Start 4/27/20 at 

10:58;  Stop 4/27/20 at 10:59;  Status DC


Cellulose (Surgicel Hemostat 4x8) 1 each STK-MED ONCE .ROUTE ;  Start 4/27/20 at

10:58;  Stop 4/27/20 at 10:59;  Status DC


Bisacodyl (Dulcolax Supp) 10 mg STK-MED ONCE .ROUTE ;  Start 4/27/20 at 10:59;  

Stop 4/27/20 at 10:59;  Status DC


Heparin Sodium (Porcine) 1000 unit/Sodium Chloride 1,001 ml @  1,001 mls/hr 1X  

ONCE IRR ;  Start 4/27/20 at 12:00;  Stop 4/27/20 at 12:59;  Status DC


Propofol 20 ml @ As Directed STK-MED ONCE IV ;  Start 4/27/20 at 11:05;  Stop 

4/27/20 at 11:05;  Status DC


Sevoflurane (Ultane) 90 ml STK-MED ONCE IH ;  Start 4/27/20 at 11:05;  Stop 

4/27/20 at 11:05;  Status DC


Sevoflurane (Ultane) 60 ml STK-MED ONCE IH ;  Start 4/27/20 at 12:26;  Stop 

4/27/20 at 12:27;  Status DC


Propofol 20 ml @ As Directed STK-MED ONCE IV ;  Start 4/27/20 at 12:26;  Stop 

4/27/20 at 12:27;  Status DC


Phenylephrine HCl (PHENYLEPHRINE in 0.9% NACL PF) 1 mg STK-MED ONCE IV ;  Start 

4/27/20 at 12:34;  Stop 4/27/20 at 12:34;  Status DC


Heparin Sodium (Porcine) (Heparin Sodium) 5,000 unit Q12HR SQ  Last administered

on 5/6/20at 20:57;  Start 4/27/20 at 21:00;  Stop 5/7/20 at 09:59;  Status DC


Sodium Chloride (Normal Saline Flush) 3 ml QSHIFT  PRN IV AFTER MEDS AND BLOOD 

DRAWS;  Start 4/27/20 at 13:45


Naloxone HCl (Narcan) 0.4 mg PRN Q2MIN  PRN IV SEE INSTRUCTIONS;  Start 4/27/20 

at 13:45


Sodium Chloride 1,000 ml @  25 mls/hr Q24H IV  Last administered on 5/8/20at 

03:00;  Start 4/27/20 at 13:37


Naloxone HCl (Narcan) 0.4 mg PRN Q2MIN  PRN IV SEE INSTRUCTIONS;  Start 4/27/20 

at 14:30;  Status UNV


Sodium Chloride 1,000 ml @  25 mls/hr Q24H IV ;  Start 4/27/20 at 14:30;  Status

UNV


Hydromorphone HCl 30 ml @ 0 mls/hr CONT PRN  PRN IV PER PROTOCOL Last 

administered on 5/2/20at 16:08;  Start 4/27/20 at 14:30;  Stop 5/4/20 at 08:55; 

Status DC


Potassium Acetate 55 meq/Magnesium Sulfate 20 meq/ Calcium Gluconate 10 meq/ 

Multivitamins 10 ml/Chromium/ Copper/Manganese/ Seleni/Zn 0.5 ml/ Insulin Human 

Regular 35 unit/ Total Parenteral Nutrition/Amino Acids/Dextrose/ Fat Emulsion 

Intravenous 1,920 ml @  80 mls/hr TPN  CONT IV  Last administered on 4/27/20at 

22:01;  Start 4/27/20 at 22:00;  Stop 4/28/20 at 21:59;  Status DC


Bumetanide (Bumex) 2 mg [id number] IV  Last administered on 5/1/20at 13:50;  Start 

4/28/20 at 14:00;  Stop 5/2/20 at 14:10;  Status DC


Meropenem 1 gm/ Sodium Chloride 100 ml @  200 mls/hr Q8HRS IV  Last administered

on 5/8/20at 14:22;  Start 4/28/20 at 14:00


Potassium Acetate 55 meq/Magnesium Sulfate 20 meq/ Calcium Gluconate 10 meq/ 

Multivitamins 10 ml/Chromium/ Copper/Manganese/ Seleni/Zn 0.5 ml/ Insulin Human 

Regular 35 unit/ Total Parenteral Nutrition/Amino Acids/Dextrose/ Fat Emulsion 

Intravenous 1,920 ml @  80 mls/hr TPN  CONT IV  Last administered on 4/28/20at 

22:02;  Start 4/28/20 at 22:00;  Stop 4/29/20 at 21:59;  Status DC


Hydromorphone HCl (Dilaudid Standard PCA) 12 mg STK-MED ONCE IV ;  Start 4/27/20

at 14:35;  Stop 4/28/20 at 13:53;  Status DC


Artificial Tears (Artificial Tears) 1 drop PRN Q15MIN  PRN OU DRY EYE Last 

administered on 5/5/20at 09:10;  Start 4/29/20 at 05:30


Hydromorphone HCl (Dilaudid Standard PCA) 12 mg STK-MED ONCE IV ;  Start 4/28/20

at 12:05;  Stop 4/29/20 at 09:15;  Status DC


Potassium Acetate 65 meq/Magnesium Sulfate 20 meq/ Calcium Gluconate 10 meq/ 

Multivitamins 10 ml/Chromium/ Copper/Manganese/ Seleni/Zn 0.5 ml/ Insulin Human 

Regular 30 unit/ Total Parenteral Nutrition/Amino Acids/Dextrose/ Fat Emulsion 

Intravenous 1,920 ml @  80 mls/hr TPN  CONT IV  Last administered on 4/29/20at 

22:22;  Start 4/29/20 at 22:00;  Stop 4/30/20 at 21:59;  Status DC


Cyclobenzaprine HCl (Flexeril) 10 mg PRN Q6HRS  PRN PO MUSCLE SPASMS;  Start 

4/30/20 at 10:45


Potassium Acetate 55 meq/Magnesium Sulfate 20 meq/ Calcium Gluconate 10 meq/ 

Multivitamins 10 ml/Chromium/ Copper/Manganese/ Seleni/Zn 0.5 ml/ Insulin Human 

Regular 30 unit/ Total Parenteral Nutrition/Amino Acids/Dextrose/ Fat Emulsion 

Intravenous 1,920 ml @  80 mls/hr TPN  CONT IV  Last administered on 5/1/20at 

01:00;  Start 4/30/20 at 22:00;  Stop 5/1/20 at 21:59;  Status DC


Magnesium Sulfate 50 ml @ 25 mls/hr 1X  ONCE IV  Last administered on 4/30/20at 

17:18;  Start 4/30/20 at 12:45;  Stop 4/30/20 at 14:44;  Status DC


Potassium Chloride/Water 100 ml @  100 mls/hr 1X  ONCE IV  Last administered on 

5/1/20at 11:27;  Start 5/1/20 at 12:00;  Stop 5/1/20 at 12:59;  Status DC


Hydromorphone HCl (Dilaudid Standard PCA) 12 mg STK-MED ONCE IV ;  Start 4/29/20

at 10:50;  Stop 5/1/20 at 11:02;  Status DC


Hydromorphone HCl (Dilaudid Standard PCA) 12 mg STK-MED ONCE IV ;  Start 4/30/20

at 13:47;  Stop 5/1/20 at 11:03;  Status DC


Potassium Acetate 30 meq/Magnesium Sulfate 20 meq/ Calcium Gluconate 10 meq/ 

Multivitamins 10 ml/Chromium/ Copper/Manganese/ Seleni/Zn 0.5 ml/ Insulin Human 

Regular 30 unit/ Potassium Chloride 30 meq/ Total Parenteral Nutrition/Amino 

Acids/Dextrose/ Fat Emulsion Intravenous 1,920 ml @  80 mls/hr TPN  CONT IV  

Last administered on 5/1/20at 22:34;  Start 5/1/20 at 22:00;  Stop 5/2/20 at 

21:59;  Status DC


Potassium Chloride/Water 100 ml @  100 mls/hr Q1H IV  Last administered on 

5/2/20at 13:05;  Start 5/2/20 at 07:00;  Stop 5/2/20 at 10:59;  Status DC


Magnesium Sulfate 50 ml @ 25 mls/hr 1X  ONCE IV  Last administered on 5/2/20at 

10:34;  Start 5/2/20 at 10:30;  Stop 5/2/20 at 12:29;  Status DC


Potassium Chloride 75 meq/ Magnesium Sulfate 20 meq/Calcium Gluconate 10 meq/ 

Multivitamins 10 ml/Chromium/ Copper/Manganese/ Seleni/Zn 0.5 ml/ Insulin Human 

Regular 30 unit/ Total Parenteral Nutrition/Amino Acids/Dextrose/ Fat Emulsion 

Intravenous 1,920 ml @  80 mls/hr TPN  CONT IV  Last administered on 5/2/20at 

21:51;  Start 5/2/20 at 22:00;  Stop 5/3/20 at 22:00;  Status DC


Potassium Chloride 75 meq/ Magnesium Sulfate 20 meq/Calcium Gluconate 10 meq/ 

Multivitamins 10 ml/Chromium/ Copper/Manganese/ Seleni/Zn 0.5 ml/ Insulin Human 

Regular 25 unit/ Total Parenteral Nutrition/Amino Acids/Dextrose/ Fat Emulsion 

Intravenous 1,920 ml @  80 mls/hr TPN  CONT IV  Last administered on 5/3/20at 

22:04;  Start 5/3/20 at 22:00;  Stop 5/4/20 at 21:59;  Status DC


Hydromorphone HCl (Dilaudid) 0.4 mg PRN Q4HRS  PRN IVP PAIN Last administered on

5/4/20at 10:57;  Start 5/4/20 at 09:00;  Stop 5/4/20 at 18:59;  Status DC


Micafungin Sodium 100 mg/Dextrose 100 ml @  100 mls/hr Q24H IV  Last 

administered on 5/8/20at 12:38;  Start 5/4/20 at 11:00


Daptomycin 485 mg/ Sodium Chloride 50 ml @  100 mls/hr Q24H IV  Last 

administered on 5/8/20at 11:29;  Start 5/4/20 at 11:00


Potassium Chloride 75 meq/ Magnesium Sulfate 15 meq/Calcium Gluconate 8 meq/ 

Multivitamins 10 ml/Chromium/ Copper/Manganese/ Seleni/Zn 0.5 ml/ Insulin Human 

Regular 25 unit/ Total Parenteral Nutrition/Amino Acids/Dextrose/ Fat Emulsion 

Intravenous 1,920 ml @  80 mls/hr TPN  CONT IV  Last administered on 5/4/20at 

23:08;  Start 5/4/20 at 22:00;  Stop 5/5/20 at 21:59;  Status DC


Haloperidol Lactate (Haldol Inj) 3 mg 1X  ONCE IVP  Last administered on 

5/4/20at 14:37;  Start 5/4/20 at 14:30;  Stop 5/4/20 at 14:31;  Status DC


Hydromorphone HCl (Dilaudid) 1 mg PRN Q4HRS  PRN IVP PAIN Last administered on 

5/8/20at 03:00;  Start 5/4/20 at 19:00


Potassium Chloride 75 meq/ Magnesium Sulfate 15 meq/Calcium Gluconate 8 meq/ 

Multivitamins 10 ml/Chromium/ Copper/Manganese/ Seleni/Zn 0.5 ml/ Insulin Human 

Regular 20 unit/ Total Parenteral Nutrition/Amino Acids/Dextrose/ Fat Emulsion 

Intravenous 1,920 ml @  80 mls/hr TPN  CONT IV  Last administered on 5/5/20at 

22:10;  Start 5/5/20 at 22:00;  Stop 5/6/20 at 21:59;  Status DC


Lidocaine HCl (Buffered Lidocaine 1%) 3 ml STK-MED ONCE .ROUTE ;  Start 5/6/20 

at 11:31;  Stop 5/6/20 at 11:31;  Status DC


Lidocaine HCl (Buffered Lidocaine 1%) 3 ml STK-MED ONCE .ROUTE ;  Start 5/6/20 

at 12:28;  Stop 5/6/20 at 12:29;  Status DC


Lidocaine HCl (Buffered Lidocaine 1%) 6 ml 1X  ONCE INJ  Last administered on 

5/6/20at 12:53;  Start 5/6/20 at 12:45;  Stop 5/6/20 at 12:46;  Status DC


Potassium Chloride 75 meq/ Magnesium Sulfate 15 meq/Calcium Gluconate 8 meq/ 

Multivitamins 10 ml/Chromium/ Copper/Manganese/ Seleni/Zn 0.5 ml/ Insulin Human 

Regular 20 unit/ Total Parenteral Nutrition/Amino Acids/Dextrose/ Fat Emulsion 

Intravenous 1,920 ml @  80 mls/hr TPN  CONT IV  Last administered on 5/6/20at 

22:00;  Start 5/6/20 at 22:00;  Stop 5/7/20 at 21:59;  Status DC


Potassium Chloride 75 meq/ Magnesium Sulfate 15 meq/Calcium Gluconate 8 meq/ 

Multivitamins 10 ml/Chromium/ Copper/Manganese/ Seleni/Zn 0.5 ml/ Insulin Human 

Regular 15 unit/ Total Parenteral Nutrition/Amino Acids/Dextrose/ Fat Emulsion 

Intravenous 1,920 ml @  80 mls/hr TPN  CONT IV  Last administered on 5/7/20at 

22:28;  Start 5/7/20 at 22:00;  Stop 5/8/20 at 21:59


Vecuronium Bromide (Norcuron Bolus) 6 mg PRN Q6HRS  PRN IV VENT ASYNCHRONY;  

Start 5/7/20 at 19:15;  Stop 5/7/20 at 19:35;  Status DC


Bumetanide (Bumex) 2 mg 1X  ONCE IV  Last administered on 5/7/20at 22:09;  Start

5/7/20 at 19:45;  Stop 5/7/20 at 19:46;  Status DC


Lidocaine HCl (Buffered Lidocaine 1%) 3 ml STK-MED ONCE .ROUTE ;  Start 5/8/20 

at 07:59;  Stop 5/8/20 at 07:59;  Status DC


Midazolam HCl (Versed) 5 mg STK-MED ONCE .ROUTE ;  Start 5/8/20 at 08:36;  Stop 

5/8/20 at 08:36;  Status DC


Fentanyl Citrate (Fentanyl 5ml Vial) 250 mcg STK-MED ONCE .ROUTE ;  Start 5/8/20

at 08:36;  Stop 5/8/20 at 08:37;  Status DC


Lidocaine HCl (Buffered Lidocaine 1%) 3 ml 1X  ONCE IJ  Last administered on 

5/8/20at 09:30;  Start 5/8/20 at 09:15;  Stop 5/8/20 at 09:16;  Status DC


Midazolam HCl (Versed) 5 mg 1X  ONCE IV  Last administered on 5/8/20at 09:30;  

Start 5/8/20 at 09:15;  Stop 5/8/20 at 09:16;  Status DC


Fentanyl Citrate (Fentanyl 5ml Vial) 250 mcg 1X  ONCE IV  Last administered on 

5/8/20at 09:30;  Start 5/8/20 at 09:15;  Stop 5/8/20 at 09:16;  Status DC


Bumetanide (Bumex) 2 mg DAILY IV ;  Start 5/8/20 at 10:00


Potassium Chloride 75 meq/ Magnesium Sulfate 15 meq/ Multivitamins 10 

ml/Chromium/ Copper/Manganese/ Seleni/Zn 0.5 ml/ Insulin Human Regular 15 unit/ 

Total Parenteral Nutrition/Amino Acids/Dextrose/ Fat Emulsion Intravenous 1,920 

ml @  80 mls/hr TPN  CONT IV ;  Start 5/8/20 at 22:00;  Stop 5/9/20 at 21:59





Active Scripts


Active


Reported


Bisoprolol Fumarate 5 Mg Tablet 10 Mg PO DAILY


Vitals/I & O





Vital Sign - Last 24 Hours








 5/7/20 5/7/20 5/7/20 5/7/20





 15:00 15:45 16:00 16:00


 


Temp    98.9





    98.9


 


Pulse 97   99


 


Resp 34   37


 


B/P (MAP) 133/72 (92)   142/79 (100)


 


Pulse Ox 97 96  97


 


O2 Delivery Tracheal Collar Tracheal Collar Trach Collar Tracheal Collar


 


O2 Flow Rate 8.0 8.0 8.0 8.0


 


    





    





 5/7/20 5/7/20 5/7/20 5/7/20





 17:00 17:52 18:00 18:31


 


Pulse 108  102 


 


Resp 40  32 34


 


B/P (MAP) 124/71 (88)  151/83 (105) 


 


Pulse Ox 96 97 97 98


 


O2 Delivery Tracheal Collar Tracheal Collar Tracheal Collar Tracheal Collar


 


O2 Flow Rate 8.0 8.0 8.0 





 5/7/20 5/7/20 5/7/20 5/7/20





 19:00 20:00 20:00 20:42


 


Temp   98.1 





   98.1 


 


Pulse 117  116 


 


Resp 32  32 


 


B/P (MAP) 148/72 (97)  140/66 (90) 


 


Pulse Ox 97  97 97


 


O2 Delivery Tracheal Collar Trach Collar Tracheal Collar Tracheal Collar


 


O2 Flow Rate 8.0 8.0 8.0 8.0


 


    





    





 5/7/20 5/7/20 5/7/20 5/7/20





 21:00 22:00 22:08 22:38


 


Pulse 115 115  


 


Resp 36 33 36 33


 


B/P (MAP) 151/87 (108) 136/78 (97)  


 


Pulse Ox 100 99 97 95


 


O2 Delivery Tracheal Collar Tracheal Collar Tracheal Collar Tracheal Collar


 


O2 Flow Rate 8.0 8.0 8.0 8.0





 5/7/20 5/8/20 5/8/20 5/8/20





 23:00 00:00 00:01 01:00


 


Temp   99.3 





   99.3 


 


Pulse 124  122 125


 


Resp 36  36 34


 


B/P (MAP) 140/74 (96)  162/87 (112) 157/82 (107)


 


Pulse Ox 99  99 95


 


O2 Delivery Tracheal Collar Trach Collar Tracheal Collar Tracheal Collar


 


O2 Flow Rate 8.0 8.0 8.0 8.0


 


    





    





 5/8/20 5/8/20 5/8/20 5/8/20





 02:00 03:00 03:00 03:30


 


Pulse 111  111 


 


Resp 30 28 28 28


 


B/P (MAP) 164/79 (107)  145/74 (97) 


 


Pulse Ox 98 98 97 98


 


O2 Delivery Tracheal Collar  Tracheal Collar 


 


O2 Flow Rate 8.0 8.0 8.0 8.0





 5/8/20 5/8/20 5/8/20 5/8/20





 04:00 04:00 05:00 06:00


 


Temp  97.3  





  97.3  


 


Pulse  110 104 109


 


Resp  31 31 35


 


B/P (MAP)  154/83 (106) 147/79 (101) 110/63 (79)


 


Pulse Ox  97 97 97


 


O2 Delivery Trach Collar Tracheal Collar Tracheal Collar Tracheal Collar


 


O2 Flow Rate 8.0 8.0 8.0 8.0


 


    





    





 5/8/20 5/8/20 5/8/20 5/8/20





 07:00 07:08 08:00 08:00


 


Temp   97.5 





   97.5 


 


Pulse 99  104 


 


Resp 28  30 


 


B/P (MAP) 125/72 (89)  150/85 (106) 


 


Pulse Ox 96 97 95 


 


O2 Delivery Tracheal Collar Tracheal Collar Tracheal Collar Mechanical 

Ventilator


 


O2 Flow Rate 8.0 8.0 8.0 


 


    





    





 5/8/20 5/8/20 5/8/20 5/8/20





 09:30 09:32 09:41 09:46


 


Pulse  91 100 90


 


Resp 30 28 30 30


 


B/P (MAP)   130/74 (92) 123/76 (92)


 


Pulse Ox  95 98 95


 


O2 Delivery Tracheal Collar Tracheal Collar Tracheal Collar Tracheal Collar


 


O2 Flow Rate  15.0  





 5/8/20 5/8/20 5/8/20 5/8/20





 09:51 09:56 10:01 10:06


 


Pulse 88 90 90 100


 


Resp 31 30 30 33


 


B/P (MAP) 134/77 (96) 123/63 (83) 127/68 (87) 135/80 (98)


 


Pulse Ox 96 94 97 95


 


O2 Delivery Tracheal Collar Tracheal Collar Tracheal Collar Tracheal Collar





 5/8/20 5/8/20 5/8/20 5/8/20





 10:11 10:16 10:17 10:21


 


Pulse 98 103 103 100


 


Resp 33 33 32 34


 


B/P (MAP) 140/78 (98) 141/77 (98)  141/82 (101)


 


Pulse Ox 97 97 98 98


 


O2 Delivery Tracheal Collar Tracheal Collar Tracheal Collar Tracheal Collar


 


O2 Flow Rate   15.0 





 5/8/20 5/8/20 5/8/20 5/8/20





 10:26 10:31 10:36 10:41


 


Pulse 93 99 101 100


 


Resp 34 34 34 34


 


B/P (MAP) 140/78 (98) 148/74 (98) 142/85 (104) 142/80 (100)


 


Pulse Ox 95 97 95 100


 


O2 Delivery Tracheal Collar Tracheal Collar Tracheal Collar Tracheal Collar





 5/8/20 5/8/20 5/8/20 5/8/20





 10:46 10:51 12:00 12:00


 


Pulse 101 98 78 


 


Resp 34 34 32 


 


B/P (MAP) 149/80 (103) 144/73 (96) 113/58 (76) 


 


Pulse Ox 99 99 99 


 


O2 Delivery Tracheal Collar Tracheal Collar Ventilator Mechanical Ventilator





 5/8/20 5/8/20  





 12:42 12:47  


 


Resp  26  


 


Pulse Ox 99 97  


 


O2 Delivery Ventilator Ventilator  














Intake and Output   


 


 5/7/20 5/7/20 5/8/20





 15:00 23:00 07:00


 


Intake Total 210 ml 1624 ml 2151.1 ml


 


Output Total 475 ml 1400 ml 2200 ml


 


Balance -265 ml 224 ml -48.9 ml











Hemodynamically unstable?:  No


Is patient in severe pain?:  Yes


Is NPO status required?:  Yes











HIRAM BARRERA MD              May 8, 2020 14:27

## 2020-05-08 NOTE — PDOC
SUBJECTIVE


ROS


stable





OBJECTIVE


Vital Signs





Vital Signs








  Date Time  Temp Pulse Resp B/P (MAP) Pulse Ox O2 Delivery O2 Flow Rate FiO2


 


5/8/20 12:47   26  97 Ventilator  


 


5/8/20 12:00  78  113/58 (76)    


 


5/8/20 10:17       15.0 


 


5/8/20 08:00 97.5       





 97.5       








I & 0











Intake and Output 


 


 5/8/20





 07:00


 


Intake Total 3985.1 ml


 


Output Total 4075 ml


 


Balance -89.9 ml


 


 


 


IV Total 3285.1 ml


 


Other 700 ml


 


Output Urine Total 3875 ml


 


Gastric Drainage Total 200 ml











PHYSICAL EXAM


Physical Exam


GENERAL: NAD 


HEENT:  oral cavity dry 


NECK:  Trach/vent 


LUNGS: Non labored  


HEART:  S1, S2, regular 


ABDOMEN: Distended, hypoactive BS, drain placement (4/27 )


: [place] 


EXTREMITIES: Generalized edema,


DERMATOLOGIC: No generalized rash.





DIAGNOSIS/ASSESSMENT


Assessment & Plan


ARF-- ATN,requiring CRRT and HD 


Off HD for past 2-3 weeks , renal function improved    


 currently on TPN,, avoid nephrotoxins


CT 5/4-?  Developing right grade 2 hydronephrosis. No  radiopaque stones.bladder

obscured by ascites  


  


Anemia-  Currently on YOLI , Hgb dropped 





Hypernatremia -- improving  





HyPokalemia- replace as needed 





Anasarca due to 3rd spacing ,  IV Bumex prn 





Hyperkalemia- resolved  





Acute pancreatitis with persistent  necrosis


 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 

the pancreas


   4/27 status post ROBERT drain placement; 





 Cholelithiasis with possible cholecystitis.





Moderate pleural effusions, may be slightly improved. Infiltrate/atelectasis in 

both lung bases.


  


 Ascites - post paracentesis  in the past


 s/p  paracentesis 5/6  





Acute hypoxic resp failure ,bilateral pleural effusion


  Trach in place,


 


HTN 





COVID-19 neg, 3/26





COMMENT/RELEVANT DATA


Meds





Current Medications








 Medications


  (Trade)  Dose


 Ordered  Sig/Yee  Start Time


 Stop Time Status Last Admin


Dose Admin


 


 Acetaminophen


  (Tylenol Supp)  650 mg  PRN Q6HRS  PRN  3/24/20 10:30


    5/5/20 09:12


650 MG


 


 Acetaminophen


  (Tylenol)  650 mg  PRN Q6HRS  PRN  3/21/20 03:36


    4/16/20 19:56


650 MG


 


 Albumin Human  200 ml @ 


 200 mls/hr  1X PRN  PRN  4/21/20 08:00


 4/21/20 13:59 DC 4/21/20 08:40


200 MLS/HR


 


 Albuterol Sulfate


  (Ventolin Neb


 Soln)  2.5 mg  1X  ONCE  3/17/20 22:30


 3/17/20 22:31 DC 3/18/20 00:56


2.5 MG


 


 Alteplase,


 Recombinant


  (Cathflo For


 Central Catheter


 Clearance)  1 mg  1X  ONCE  4/24/20 10:45


 4/24/20 10:46 DC 4/24/20 11:44


1 MG


 


 Amino Acids/


 Glycerin/


 Electrolytes  1,000 ml @ 


 75 mls/hr  [id number]  4/20/20 21:15


   UNV  





 


 Artificial Tears


  (Artificial


 Tears)  1 drop  PRN Q15MIN  PRN  4/29/20 05:30


    5/5/20 09:10


1 DROP


 


 Atenolol


  (Tenormin)  100 mg  DAILY  3/17/20 09:00


 3/16/20 20:08 DC  





 


 Atropine Sulfate


  (ATROPINE 0.5mg


 SYRINGE)  0.5 mg  PRN Q5MIN  PRN  4/2/20 08:15


     





 


 Benzocaine


  (Hurricaine One)  1 spray  1X  ONCE  3/20/20 14:30


 3/20/20 14:31 DC 3/20/20 16:38


1 SPRAY


 


 Bisacodyl


  (Dulcolax Supp)  10 mg  STK-MED ONCE  4/27/20 10:59


 4/27/20 10:59 DC  





 


 Bumetanide


  (Bumex)  2 mg  DAILY  5/8/20 10:00


     





 


 Bupivacaine HCl/


 Epinephrine Bitart


  (Sensorcain-Epi


 0.5%-1:889263 Mpf)  30 ml  STK-MED ONCE  4/27/20 10:58


 4/27/20 10:58 DC 4/27/20 12:01


7 ML


 


 Calcium Carbonate/


 Glycine


  (Tums)  500 mg  PRN [id number]  PRN  3/18/20 17:45


     





 


 Calcium Chloride


 1000 mg/Sodium


 Chloride  110 ml @ 


 220 mls/hr  1X  ONCE  3/17/20 22:30


 3/17/20 22:59 DC 3/17/20 22:11


220 MLS/HR


 


 Calcium Chloride


 3000 mg/Sodium


 Chloride  1,030 ml @ 


 50 mls/hr  [id number]  3/19/20 08:00


 3/21/20 15:23 DC 3/21/20 02:17


50 MLS/HR


 


 Calcium Gluconate


  (Calcium


 Gluconate)  2,000 mg  1X  ONCE  3/19/20 02:15


 3/19/20 02:16 DC 3/19/20 02:19


2,000 MG


 


 Calcium Gluconate


 1000 mg/Sodium


 Chloride  110 ml @ 


 220 mls/hr  1X  ONCE  3/18/20 03:30


 3/18/20 03:59 DC 3/18/20 03:21


220 MLS/HR


 


 Calcium Gluconate


 2000 mg/Sodium


 Chloride  120 ml @ 


 220 mls/hr  1X  ONCE  3/18/20 07:30


 3/18/20 08:02 DC 3/18/20 09:05


220 MLS/HR


 


 Cefepime HCl


  (Maxipime)  2 gm  Q12HR  3/25/20 09:00


 4/8/20 09:58 DC 4/7/20 20:56


2 GM


 


 Cellulose


  (Surgicel


 Fibrillar 1x2)  1 each  STK-MED ONCE  4/6/20 11:00


 4/6/20 11:01 DC  





 


 Cellulose


  (Surgicel


 Hemostat 2x14)  1 each  STK-MED ONCE  4/27/20 10:58


 4/27/20 10:59 DC  





 


 Cellulose


  (Surgicel


 Hemostat 4x8)  1 each  STK-MED ONCE  4/27/20 10:58


 4/27/20 10:59 DC  





 


 Cyclobenzaprine


 HCl


  (Flexeril)  10 mg  PRN Q6HRS  PRN  4/30/20 10:45


     





 


 Daptomycin 430 mg/


 Sodium Chloride  50 ml @ 


 100 mls/hr  Q24H  4/25/20 13:00


 4/30/20 20:58 DC 4/30/20 13:00


100 MLS/HR


 


 Daptomycin 485 mg/


 Sodium Chloride  50 ml @ 


 100 mls/hr  Q24H  5/4/20 11:00


    5/8/20 11:29


100 MLS/HR


 


 Daptomycin 500 mg/


 Sodium Chloride  50 ml @ 


 100 mls/hr  Q48H  3/25/20 08:30


 4/10/20 10:07 DC 4/10/20 09:57


100 MLS/HR


 


 Dexamethasone


 Sodium Phosphate


  (Decadron)  4 mg  STK-MED ONCE  4/27/20 10:56


 4/27/20 10:57 DC  





 


 Dexmedetomidine


 HCl 400 mcg/


 Sodium Chloride  100 ml @ 0


 mls/hr  CONT  PRN  4/2/20 08:15


    5/8/20 11:32


16.7 MLS/HR


 


 Dextrose


  (Dextrose


 50%-Water Syringe)  12.5 gm  PRN Q15MIN  PRN  3/16/20 09:30


     





 


 Digoxin


  (Lanoxin)  125 mcg  1X  ONCE  3/19/20 18:00


 3/19/20 18:01 DC 3/19/20 17:10


125 MCG


 


 Diphenhydramine


 HCl


  (Benadryl)  25 mg  1X PRN  PRN  4/24/20 15:45


 4/25/20 15:44 DC  





 


 Enoxaparin Sodium


  (Lovenox 100mg


 Syringe)  100 mg  Q12HR  4/21/20 21:00


   UNV  





 


 Etomidate


  (Amidate)  8 mg  1X  ONCE  3/23/20 08:30


 3/23/20 08:31 DC 3/23/20 08:33


8 MG


 


 Fentanyl Citrate


  (Fentanyl 2ml


 Vial)  100 mcg  STK-MED ONCE  4/27/20 10:56


 4/27/20 10:57 DC  





 


 Fentanyl Citrate


  (Fentanyl 5ml


 Vial)  250 mcg  1X  ONCE  5/8/20 09:15


 5/8/20 09:16 DC 5/8/20 09:30


50 MCG


 


 Furosemide


  (Lasix)  40 mg  1X  ONCE  4/13/20 14:30


 4/13/20 14:31 DC 4/13/20 14:39


40 MG


 


 Haloperidol


 Lactate


  (Haldol Inj)  3 mg  1X  ONCE  5/4/20 14:30


 5/4/20 14:31 DC 5/4/20 14:37


3 MG


 


 Heparin Sodium


  (Porcine)


  (Hep Lock Adult)  500 unit  STK-MED ONCE  4/7/20 09:29


 4/7/20 09:30 DC  





 


 Heparin Sodium


  (Porcine)


  (Heparin Sodium)  5,000 unit  Q12HR  4/27/20 21:00


 5/7/20 09:59 DC 5/6/20 20:57


5,000 UNIT


 


 Heparin Sodium


  (Porcine) 1000


 unit/Sodium


 Chloride  1,001 ml @ 


 1,001 mls/hr  1X  ONCE  4/27/20 12:00


 4/27/20 12:59 DC  





 


 Hydromorphone HCl


  (Dilaudid


 Standard PCA)  12 mg  STK-MED ONCE  4/30/20 13:47


 5/1/20 11:03 DC  





 


 Hydromorphone HCl


  (Dilaudid)  1 mg  PRN Q4HRS  PRN  5/4/20 19:00


    5/8/20 03:00


1 MG


 


 Info


  (CONTRAST GIVEN


 -- Rx MONITORING)  1 each  PRN DAILY  PRN  3/30/20 11:45


 4/1/20 11:44 DC  





 


 Info


  (Icu Electrolyte


 Protocol)  1 ea  CONT PRN  PRN  3/29/20 13:15


     





 


 Info


  (PHARMACY


 MONITORING -- do


 not chart)  1 each  PRN DAILY  PRN  4/24/20 15:45


     





 


 Info


  (Tpn Per


 Pharmacy)  1 each  PRN DAILY  PRN  3/18/20 12:30


   UNV  





 


 Insulin Human


 Lispro


  (HumaLOG)  0-9 UNITS  Q6HRS  3/16/20 09:30


    5/8/20 12:44


4 UNITS


 


 Insulin Human


 Regular


  (HumuLIN R VIAL)  5 unit  1X  ONCE  3/17/20 22:30


 3/17/20 22:31 DC 3/17/20 22:14


5 UNIT


 


 Iohexol


  (Omnipaque 240


 Mg/ml)  30 ml  1X  ONCE  3/30/20 11:30


 3/30/20 11:33 DC 3/30/20 11:30


30 ML


 


 Iohexol


  (Omnipaque 300


 Mg/ml)  50 ml  STK-MED ONCE  4/27/20 10:58


 4/27/20 10:59 DC  





 


 Iohexol


  (Omnipaque 350


 Mg/ml)  90 ml  1X  ONCE  3/16/20 03:30


 3/16/20 03:31 DC 3/16/20 03:25


90 ML


 


 Ketorolac


 Tromethamine


  (Toradol 30mg


 Vial)  30 mg  1X  ONCE  3/16/20 03:00


 3/16/20 03:01 DC 3/16/20 02:54


30 MG


 


 Lidocaine HCl


  (Buffered


 Lidocaine 1%)  3 ml  1X  ONCE  5/8/20 09:15


 5/8/20 09:16 DC 5/8/20 09:30


16 ML


 


 Lidocaine HCl


  (Glydo


  (Lidocaine) Jelly)  1 thomas  1X  ONCE  3/20/20 14:30


 3/20/20 14:31 DC 3/20/20 16:38


1 THOMAS


 


 Lidocaine HCl


  (Xylocaine-Mpf


 1% 2ml Vial)  2 ml  PRN 1X  PRN  4/27/20 07:00


 4/28/20 06:59 DC  





 


 Linezolid/Dextrose  300 ml @ 


 300 mls/hr  Q12HR  4/10/20 11:00


 4/21/20 08:10 DC 4/20/20 20:40


300 MLS/HR


 


 Lorazepam


  (Ativan Inj)  1 mg  PRN Q4HRS  PRN  3/19/20 09:00


 4/17/20 09:19 DC 4/17/20 03:51


1 MG


 


 Magnesium Sulfate  50 ml @ 25


 mls/hr  1X  ONCE  5/2/20 10:30


 5/2/20 12:29 DC 5/2/20 10:34


25 MLS/HR


 


 Meropenem 1 gm/


 Sodium Chloride  100 ml @ 


 200 mls/hr  Q8HRS  4/28/20 14:00


    5/8/20 05:46


200 MLS/HR


 


 Meropenem 500 mg/


 Sodium Chloride  50 ml @ 


 100 mls/hr  Q12H  4/8/20 10:00


 4/28/20 12:37 DC 4/28/20 10:45


100 MLS/HR


 


 Metoprolol


 Tartrate


  (Lopressor Vial)  5 mg  Q6HRS  3/17/20 10:15


 3/28/20 08:48 DC 3/26/20 00:12


5 MG


 


 Metronidazole  100 ml @ 


 100 mls/hr  Q8HRS  4/14/20 10:00


 4/21/20 08:10 DC 4/21/20 06:04


100 MLS/HR


 


 Micafungin Sodium


 100 mg/Dextrose  100 ml @ 


 100 mls/hr  Q24H  5/4/20 11:00


    5/8/20 12:38


100 MLS/HR


 


 Midazolam HCl


  (Versed)  5 mg  1X  ONCE  5/8/20 09:15


 5/8/20 09:16 DC 5/8/20 09:30


1 MG


 


 Midazolam HCl 100


 mg/Sodium Chloride  100 ml @ 7


 mls/hr  CONT  PRN  3/28/20 16:00


    4/8/20 15:35


7 MLS/HR


 


 Midazolam HCl 50


 mg/Sodium Chloride  50 ml @ 0


 mls/hr  CONT  PRN  3/23/20 08:15


 3/28/20 15:59 DC 3/26/20 22:39


7 MLS/HR


 


 Morphine Sulfate


  (Morphine


 Sulfate)  2 mg  PRN Q2HR  PRN  3/16/20 05:00


 3/17/20 14:15 DC 3/17/20 12:26


2 MG


 


 Multi-Ingred


 Cream/Lotion/Oil/


 Oint


  (Artificial


 Tears Eye


 Ointment)  1 thomas  PRN Q1HR  PRN  3/25/20 17:30


    4/13/20 08:19


1 THOMAS


 


 Naloxone HCl


  (Narcan)  0.4 mg  PRN Q2MIN  PRN  4/27/20 14:30


   UNV  





 


 Norepinephrine


 Bitartrate 8 mg/


 Dextrose  258 ml @ 


 17.299 mls/


 hr  CONT  PRN  3/17/20 15:30


 4/17/20 09:19 DC 4/14/20 12:48


20.9 MLS/HR


 


 Ondansetron HCl


  (Zofran)  4 mg  STK-MED ONCE  4/27/20 10:56


 4/27/20 10:57 DC  





 


 Pantoprazole


 Sodium


  (PROTONIX VIAL


 for IV PUSH)  40 mg  DAILYAC  3/16/20 11:30


    5/8/20 08:25


40 MG


 


 Phenylephrine HCl


  (PHENYLEPHRINE


 in 0.9% NACL PF)  1 mg  STK-MED ONCE  4/27/20 12:34


 4/27/20 12:34 DC  





 


 Piperacillin Sod/


 Tazobactam Sod


 4.5 gm/Sodium


 Chloride  100 ml @ 


 200 mls/hr  1X  ONCE  3/16/20 06:00


 3/16/20 06:29 DC 3/16/20 05:44


200 MLS/HR


 


 Potassium


 Chloride 15 meq/


 Bicarbonate


 Dialysis Soln w/


 out KCl  5,007.5 ml


  @ 1,000 mls/


 hr  Q5H1M  3/29/20 20:00


 4/2/20 13:08 DC 4/1/20 18:14


1,000 MLS/HR


 


 Potassium


 Chloride 20 meq/


 Bicarbonate


 Dialysis Soln w/


 out KCl  5,010 ml @ 


 1,000 mls/hr  Q5H1M  3/25/20 16:00


 3/29/20 19:59 DC 3/29/20 14:54


1,000 MLS/HR


 


 Potassium


 Chloride 75 meq/


 Magnesium Sulfate


 15 meq/


 Multivitamins 10


 ml/Chromium/


 Copper/Manganese/


 Seleni/Zn 0.5 ml/


 Insulin Human


 Regular 15 unit/


 Total Parenteral


 Nutrition/Amino


 Acids/Dextrose/


 Fat Emulsion


 Intravenous  1,920 ml @ 


 80 mls/hr  TPN  CONT  5/8/20 22:00


 5/9/20 21:59   





 


 Potassium


 Chloride 75 meq/


 Magnesium Sulfate


 15 meq/Calcium


 Gluconate 8 meq/


 Multivitamins 10


 ml/Chromium/


 Copper/Manganese/


 Seleni/Zn 0.5 ml/


 Insulin Human


 Regular 15 unit/


 Total Parenteral


 Nutrition/Amino


 Acids/Dextrose/


 Fat Emulsion


 Intravenous  1,920 ml @ 


 80 mls/hr  TPN  CONT  5/7/20 22:00


 5/8/20 21:59  5/7/20 22:28


80 MLS/HR


 


 Potassium


 Chloride 75 meq/


 Magnesium Sulfate


 15 meq/Calcium


 Gluconate 8 meq/


 Multivitamins 10


 ml/Chromium/


 Copper/Manganese/


 Seleni/Zn 0.5 ml/


 Insulin Human


 Regular 20 unit/


 Total Parenteral


 Nutrition/Amino


 Acids/Dextrose/


 Fat Emulsion


 Intravenous  1,920 ml @ 


 80 mls/hr  TPN  CONT  5/6/20 22:00


 5/7/20 21:59 DC 5/6/20 22:00


80 MLS/HR


 


 Potassium


 Chloride 75 meq/


 Magnesium Sulfate


 15 meq/Calcium


 Gluconate 8 meq/


 Multivitamins 10


 ml/Chromium/


 Copper/Manganese/


 Seleni/Zn 0.5 ml/


 Insulin Human


 Regular 25 unit/


 Total Parenteral


 Nutrition/Amino


 Acids/Dextrose/


 Fat Emulsion


 Intravenous  1,920 ml @ 


 80 mls/hr  TPN  CONT  5/4/20 22:00


 5/5/20 21:59 DC 5/4/20 23:08


80 MLS/HR


 


 Potassium


 Chloride 75 meq/


 Magnesium Sulfate


 20 meq/Calcium


 Gluconate 10 meq/


 Multivitamins 10


 ml/Chromium/


 Copper/Manganese/


 Seleni/Zn 0.5 ml/


 Insulin Human


 Regular 25 unit/


 Total Parenteral


 Nutrition/Amino


 Acids/Dextrose/


 Fat Emulsion


 Intravenous  1,920 ml @ 


 80 mls/hr  TPN  CONT  5/3/20 22:00


 5/4/20 21:59 DC 5/3/20 22:04


80 MLS/HR


 


 Potassium


 Chloride 75 meq/


 Magnesium Sulfate


 20 meq/Calcium


 Gluconate 10 meq/


 Multivitamins 10


 ml/Chromium/


 Copper/Manganese/


 Seleni/Zn 0.5 ml/


 Insulin Human


 Regular 30 unit/


 Total Parenteral


 Nutrition/Amino


 Acids/Dextrose/


 Fat Emulsion


 Intravenous  1,920 ml @ 


 80 mls/hr  TPN  CONT  5/2/20 22:00


 5/3/20 22:00 DC 5/2/20 21:51


80 MLS/HR


 


 Potassium


 Chloride/Water  100 ml @ 


 100 mls/hr  Q1H  5/2/20 07:00


 5/2/20 10:59 DC 5/2/20 13:05


100 MLS/HR


 


 Potassium


 Phosphate 20 mmol/


 Sodium Chloride  106.6667


 ml @ 


 51.667 m...  1X  ONCE  3/25/20 13:00


 3/25/20 15:03 DC 3/25/20 12:51


51.667 MLS/HR


 


 Potassium Acetate


 30 meq/Magnesium


 Sulfate 20 meq/


 Calcium Gluconate


 10 meq/


 Multivitamins 10


 ml/Chromium/


 Copper/Manganese/


 Seleni/Zn 0.5 ml/


 Insulin Human


 Regular 30 unit/


 Potassium


 Chloride 30 meq/


 Total Parenteral


 Nutrition/Amino


 Acids/Dextrose/


 Fat Emulsion


 Intravenous  1,920 ml @ 


 80 mls/hr  TPN  CONT  5/1/20 22:00


 5/2/20 21:59 DC 5/1/20 22:34


80 MLS/HR


 


 Potassium Acetate


 55 meq/Magnesium


 Sulfate 20 meq/


 Calcium Gluconate


 10 meq/


 Multivitamins 10


 ml/Chromium/


 Copper/Manganese/


 Seleni/Zn 0.5 ml/


 Insulin Human


 Regular 30 unit/


 Total Parenteral


 Nutrition/Amino


 Acids/Dextrose/


 Fat Emulsion


 Intravenous  1,920 ml @ 


 80 mls/hr  TPN  CONT  4/30/20 22:00


 5/1/20 21:59 DC 5/1/20 01:00


80 MLS/HR


 


 Potassium Acetate


 55 meq/Magnesium


 Sulfate 20 meq/


 Calcium Gluconate


 10 meq/


 Multivitamins 10


 ml/Chromium/


 Copper/Manganese/


 Seleni/Zn 0.5 ml/


 Insulin Human


 Regular 35 unit/


 Total Parenteral


 Nutrition/Amino


 Acids/Dextrose/


 Fat Emulsion


 Intravenous  1,920 ml @ 


 80 mls/hr  TPN  CONT  4/28/20 22:00


 4/29/20 21:59 DC 4/28/20 22:02


80 MLS/HR


 


 Potassium Acetate


 65 meq/Magnesium


 Sulfate 20 meq/


 Calcium Gluconate


 10 meq/


 Multivitamins 10


 ml/Chromium/


 Copper/Manganese/


 Seleni/Zn 0.5 ml/


 Insulin Human


 Regular 30 unit/


 Total Parenteral


 Nutrition/Amino


 Acids/Dextrose/


 Fat Emulsion


 Intravenous  1,920 ml @ 


 80 mls/hr  TPN  CONT  4/29/20 22:00


 4/30/20 21:59 DC 4/29/20 22:22


80 MLS/HR


 


 Prochlorperazine


 Edisylate


  (Compazine)  5 mg  PACU PRN  PRN  4/27/20 07:00


 4/28/20 06:59 DC  





 


 Propofol  20 ml @ As


 Directed  STK-MED ONCE  4/27/20 12:26


 4/27/20 12:27 DC  





 


 Ringer's Solution  1,000 ml @ 


 30 mls/hr  Q24H  4/27/20 07:00


 4/27/20 18:59 DC  





 


 Rocuronium Bromide


  (Zemuron)  50 mg  STK-MED ONCE  4/27/20 10:56


 4/27/20 10:57 DC  





 


 Sevoflurane


  (Ultane)  60 ml  STK-MED ONCE  4/27/20 12:26


 4/27/20 12:27 DC  





 


 Sodium


 Bicarbonate 50


 meq/Sodium


 Chloride  1,050 ml @ 


 75 mls/hr  Q14H  3/18/20 07:30


 3/23/20 10:28 DC 3/22/20 21:10


75 MLS/HR


 


 Sodium Acetate 50


 meq/Potassium


 Acetate 55 meq/


 Magnesium Sulfate


 20 meq/Calcium


 Gluconate 10 meq/


 Multivitamins 10


 ml/Chromium/


 Copper/Manganese/


 Seleni/Zn 0.5 ml/


 Insulin Human


 Regular 35 unit/


 Total Parenteral


 Nutrition/Amino


 Acids/Dextrose/


 Fat Emulsion


 Intravenous  1,800 ml @ 


 75 mls/hr  TPN  CONT  4/25/20 22:00


 4/26/20 21:59 DC 4/25/20 22:03


75 MLS/HR


 


 Sodium Chloride  1,000 ml @ 


 25 mls/hr  Q24H  4/27/20 14:30


   UNV  





 


 Sodium Chloride


  (Normal Saline


 Flush)  3 ml  QSHIFT  PRN  4/27/20 13:45


     





 


 Sodium Chloride


 90 meq/Calcium


 Gluconate 10 meq/


 Multivitamins 10


 ml/Chromium/


 Copper/Manganese/


 Seleni/Zn 0.5 ml/


 Total Parenteral


 Nutrition/Amino


 Acids/Dextrose/


 Fat Emulsion


 Intravenous  1,512 ml @ 


 63 mls/hr  TPN  CONT  3/18/20 22:00


 3/19/20 21:59 DC 3/18/20 22:06


63 MLS/HR


 


 Sodium Chloride


 90 meq/Calcium


 Gluconate 10 meq/


 Multivitamins 10


 ml/Chromium/


 Copper/Manganese/


 Seleni/Zn 1 ml/


 Total Parenteral


 Nutrition/Amino


 Acids/Dextrose/


 Fat Emulsion


 Intravenous  55.005 ml


  @ 2.292


 mls/hr  TPN  CONT  3/18/20 22:00


 3/18/20 12:33 DC  





 


 Sodium Chloride


 90 meq/Magnesium


 Sulfate 10 meq/


 Calcium Gluconate


 20 meq/


 Multivitamins 10


 ml/Chromium/


 Copper/Manganese/


 Seleni/Zn 0.5 ml/


 Total Parenteral


 Nutrition/Amino


 Acids/Dextrose/


 Fat Emulsion


 Intravenous  1,512 ml @ 


 63 mls/hr  TPN  CONT  3/19/20 22:00


 3/20/20 21:59 DC 3/19/20 22:25


63 MLS/HR


 


 Sodium Chloride


 90 meq/Magnesium


 Sulfate 12 meq/


 Calcium Gluconate


 15 meq/


 Multivitamins 10


 ml/Chromium/


 Copper/Manganese/


 Seleni/Zn 0.5 ml/


 Insulin Human


 Regular 25 unit/


 Total Parenteral


 Nutrition/Amino


 Acids/Dextrose/


 Fat Emulsion


 Intravenous  1,400 ml @ 


 58.333 mls/


 hr  TPN  CONT  4/8/20 22:00


 4/9/20 21:59 DC 4/8/20 21:41


58.333 MLS/HR


 


 Sodium Chloride


 90 meq/Potassium


 Chloride 15 meq/


 Magnesium Sulfate


 12 meq/Calcium


 Gluconate 15 meq/


 Multivitamins 10


 ml/Chromium/


 Copper/Manganese/


 Seleni/Zn 0.5 ml/


 Insulin Human


 Regular 25 unit/


 Total Parenteral


 Nutrition/Amino


 Acids/Dextrose/


 Fat Emulsion


 Intravenous  1,400 ml @ 


 58.333 mls/


 hr  TPN  CONT  4/7/20 22:00


 4/8/20 21:59 DC 4/7/20 22:13


58.333 MLS/HR


 


 Sodium Chloride


 90 meq/Potassium


 Chloride 15 meq/


 Potassium


 Phosphate 10 mmol/


 Magnesium Sulfate


 8 meq/Calcium


 Gluconate 15 meq/


 Multivitamins 10


 ml/Chromium/


 Copper/Manganese/


 Seleni/Zn 0.5 ml/


 Insulin Human


 Regular 25 unit/


 Total Parenteral


 Nutrition/Amino


 Acids/Dextrose/


 Fat Emulsion


 Intravenous  1,400 ml @ 


 58.333 mls/


 hr  TPN  CONT  4/5/20 22:00


 4/6/20 21:59 DC 4/5/20 21:20


58.333 MLS/HR


 


 Sodium Chloride


 90 meq/Potassium


 Chloride 15 meq/


 Potassium


 Phosphate 10 mmol/


 Magnesium Sulfate


 10 meq/Calcium


 Gluconate 20 meq/


 Multivitamins 10


 ml/Chromium/


 Copper/Manganese/


 Seleni/Zn 0.5 ml/


 Total Parenteral


 Nutrition/Amino


 Acids/Dextrose/


 Fat Emulsion


 Intravenous  1,400 ml @ 


 58.333 mls/


 hr  TPN  CONT  3/23/20 22:00


 3/24/20 21:59 DC 3/23/20 21:42


58.333 MLS/HR


 


 Sodium Chloride


 90 meq/Potassium


 Chloride 15 meq/


 Potassium


 Phosphate 10 mmol/


 Magnesium Sulfate


 12 meq/Calcium


 Gluconate 15 meq/


 Multivitamins 10


 ml/Chromium/


 Copper/Manganese/


 Seleni/Zn 0.5 ml/


 Insulin Human


 Regular 25 unit/


 Total Parenteral


 Nutrition/Amino


 Acids/Dextrose/


 Fat Emulsion


 Intravenous  1,400 ml @ 


 58.333 mls/


 hr  TPN  CONT  4/6/20 22:00


 4/7/20 21:59 DC 4/6/20 22:24


58.333 MLS/HR


 


 Sodium Chloride


 90 meq/Potassium


 Chloride 15 meq/


 Potassium


 Phosphate 15 mmol/


 Magnesium Sulfate


 10 meq/Calcium


 Gluconate 15 meq/


 Multivitamins 10


 ml/Chromium/


 Copper/Manganese/


 Seleni/Zn 0.5 ml/


 Total Parenteral


 Nutrition/Amino


 Acids/Dextrose/


 Fat Emulsion


 Intravenous  1,400 ml @ 


 58.333 mls/


 hr  TPN  CONT  3/24/20 22:00


 3/25/20 21:59 DC 3/24/20 22:17


58.333 MLS/HR


 


 Sodium Chloride


 90 meq/Potassium


 Chloride 15 meq/


 Potassium


 Phosphate 15 mmol/


 Magnesium Sulfate


 10 meq/Calcium


 Gluconate 20 meq/


 Multivitamins 10


 ml/Chromium/


 Copper/Manganese/


 Seleni/Zn 0.5 ml/


 Total Parenteral


 Nutrition/Amino


 Acids/Dextrose/


 Fat Emulsion


 Intravenous  1,200 ml @ 


 50 mls/hr  TPN  CONT  3/22/20 22:00


 3/22/20 14:17 DC  





 


 Sodium Chloride


 90 meq/Potassium


 Chloride 15 meq/


 Potassium


 Phosphate 18 mmol/


 Magnesium Sulfate


 8 meq/Calcium


 Gluconate 15 meq/


 Multivitamins 10


 ml/Chromium/


 Copper/Manganese/


 Seleni/Zn 0.5 ml/


 Insulin Human


 Regular 10 unit/


 Total Parenteral


 Nutrition/Amino


 Acids/Dextrose/


 Fat Emulsion


 Intravenous  1,400 ml @ 


 58.333 mls/


 hr  TPN  CONT  3/27/20 22:00


 3/28/20 21:59 DC 3/27/20 21:43


58.333 MLS/HR


 


 Sodium Chloride


 90 meq/Potassium


 Chloride 15 meq/


 Potassium


 Phosphate 18 mmol/


 Magnesium Sulfate


 8 meq/Calcium


 Gluconate 15 meq/


 Multivitamins 10


 ml/Chromium/


 Copper/Manganese/


 Seleni/Zn 0.5 ml/


 Insulin Human


 Regular 15 unit/


 Total Parenteral


 Nutrition/Amino


 Acids/Dextrose/


 Fat Emulsion


 Intravenous  1,400 ml @ 


 58.333 mls/


 hr  TPN  CONT  3/30/20 22:00


 3/31/20 21:59 DC 3/30/20 21:47


58.333 MLS/HR


 


 Sodium Chloride


 90 meq/Potassium


 Chloride 15 meq/


 Potassium


 Phosphate 18 mmol/


 Magnesium Sulfate


 8 meq/Calcium


 Gluconate 15 meq/


 Multivitamins 10


 ml/Chromium/


 Copper/Manganese/


 Seleni/Zn 0.5 ml/


 Insulin Human


 Regular 20 unit/


 Total Parenteral


 Nutrition/Amino


 Acids/Dextrose/


 Fat Emulsion


 Intravenous  1,400 ml @ 


 58.333 mls/


 hr  TPN  CONT  4/2/20 22:00


 4/3/20 21:59 DC 4/2/20 22:45


58.333 MLS/HR


 


 Sodium Chloride


 90 meq/Potassium


 Chloride 15 meq/


 Potassium


 Phosphate 18 mmol/


 Magnesium Sulfate


 8 meq/Calcium


 Gluconate 15 meq/


 Multivitamins 10


 ml/Chromium/


 Copper/Manganese/


 Seleni/Zn 0.5 ml/


 Total Parenteral


 Nutrition/Amino


 Acids/Dextrose/


 Fat Emulsion


 Intravenous  1,400 ml @ 


 58.333 mls/


 hr  TPN  CONT  3/26/20 22:00


 3/27/20 21:59 DC 3/26/20 22:00


58.333 MLS/HR


 


 Sodium Chloride


 90 meq/Potassium


 Phosphate 15 mmol/


 Magnesium Sulfate


 12 meq/Calcium


 Gluconate 15 meq/


 Multivitamins 10


 ml/Chromium/


 Copper/Manganese/


 Seleni/Zn 0.5 ml/


 Insulin Human


 Regular 30 unit/


 Total Parenteral


 Nutrition/Amino


 Acids/Dextrose/


 Fat Emulsion


 Intravenous  1,400 ml @ 


 58.333 mls/


 hr  TPN  CONT  4/10/20 22:00


 4/11/20 21:59 DC 4/10/20 21:49


58.333 MLS/HR


 


 Sodium Chloride


 90 meq/Potassium


 Phosphate 15 mmol/


 Magnesium Sulfate


 12 meq/Calcium


 Gluconate 15 meq/


 Multivitamins 10


 ml/Chromium/


 Copper/Manganese/


 Seleni/Zn 0.5 ml/


 Insulin Human


 Regular 40 unit/


 Total Parenteral


 Nutrition/Amino


 Acids/Dextrose/


 Fat Emulsion


 Intravenous  1,400 ml @ 


 58.333 mls/


 hr  TPN  CONT  4/11/20 22:00


 4/12/20 21:59 DC 4/11/20 21:21


58.333 MLS/HR


 


 Sodium Chloride


 90 meq/Potassium


 Phosphate 19 mmol/


 Magnesium Sulfate


 12 meq/Calcium


 Gluconate 15 meq/


 Multivitamins 10


 ml/Chromium/


 Copper/Manganese/


 Seleni/Zn 0.5 ml/


 Insulin Human


 Regular 40 unit/


 Total Parenteral


 Nutrition/Amino


 Acids/Dextrose/


 Fat Emulsion


 Intravenous  1,400 ml @ 


 58.333 mls/


 hr  TPN  CONT  4/12/20 22:00


 4/13/20 21:59 DC 4/12/20 21:54


58.333 MLS/HR


 


 Sodium Chloride


 90 meq/Potassium


 Phosphate 5 mmol/


 Magnesium Sulfate


 12 meq/Calcium


 Gluconate 15 meq/


 Multivitamins 10


 ml/Chromium/


 Copper/Manganese/


 Seleni/Zn 0.5 ml/


 Insulin Human


 Regular 30 unit/


 Total Parenteral


 Nutrition/Amino


 Acids/Dextrose/


 Fat Emulsion


 Intravenous  1,400 ml @ 


 58.333 mls/


 hr  TPN  CONT  4/9/20 22:00


 4/10/20 21:59 DC 4/9/20 22:08


58.333 MLS/HR


 


 Sodium Chloride


 100 meq/Potassium


 Chloride 40 meq/


 Magnesium Sulfate


 15 meq/Calcium


 Gluconate 15 meq/


 Multivitamins 10


 ml/Chromium/


 Copper/Manganese/


 Seleni/Zn 0.5 ml/


 Insulin Human


 Regular 35 unit/


 Total Parenteral


 Nutrition/Amino


 Acids/Dextrose/


 Fat Emulsion


 Intravenous  1,400 ml @ 


 58.333 mls/


 hr  TPN  CONT  4/19/20 22:00


 4/20/20 21:59 DC 4/19/20 22:46


58.333 MLS/HR


 


 Sodium Chloride


 100 meq/Potassium


 Chloride 40 meq/


 Magnesium Sulfate


 20 meq/Calcium


 Gluconate 10 meq/


 Multivitamins 10


 ml/Chromium/


 Copper/Manganese/


 Seleni/Zn 0.5 ml/


 Insulin Human


 Regular 35 unit/


 Total Parenteral


 Nutrition/Amino


 Acids/Dextrose/


 Fat Emulsion


 Intravenous  1,400 ml @ 


 58.333 mls/


 hr  TPN  CONT  4/23/20 22:00


 4/24/20 21:59 DC 4/24/20 00:06


58.333 MLS/HR


 


 Sodium Chloride


 100 meq/Potassium


 Chloride 40 meq/


 Magnesium Sulfate


 20 meq/Calcium


 Gluconate 15 meq/


 Multivitamins 10


 ml/Chromium/


 Copper/Manganese/


 Seleni/Zn 0.5 ml/


 Insulin Human


 Regular 35 unit/


 Total Parenteral


 Nutrition/Amino


 Acids/Dextrose/


 Fat Emulsion


 Intravenous  1,400 ml @ 


 58.333 mls/


 hr  TPN  CONT  4/22/20 22:00


 4/23/20 21:59 DC 4/22/20 22:27


58.333 MLS/HR


 


 Sodium Chloride


 100 meq/Potassium


 Phosphate 10 mmol/


 Magnesium Sulfate


 12 meq/Calcium


 Gluconate 15 meq/


 Multivitamins 10


 ml/Chromium/


 Copper/Manganese/


 Seleni/Zn 0.5 ml/


 Insulin Human


 Regular 35 unit/


 Potassium


 Chloride 20 meq/


 Total Parenteral


 Nutrition/Amino


 Acids/Dextrose/


 Fat Emulsion


 Intravenous  1,400 ml @ 


 58.333 mls/


 hr  TPN  CONT  4/16/20 22:00


 4/17/20 21:59 DC 4/16/20 22:10


58.333 MLS/HR


 


 Sodium Chloride


 100 meq/Potassium


 Phosphate 19 mmol/


 Magnesium Sulfate


 12 meq/Calcium


 Gluconate 15 meq/


 Multivitamins 10


 ml/Chromium/


 Copper/Manganese/


 Seleni/Zn 0.5 ml/


 Insulin Human


 Regular 40 unit/


 Potassium


 Chloride 20 meq/


 Total Parenteral


 Nutrition/Amino


 Acids/Dextrose/


 Fat Emulsion


 Intravenous  1,400 ml @ 


 58.333 mls/


 hr  TPN  CONT  4/15/20 22:00


 4/16/20 21:59 DC 4/15/20 21:20


58.333 MLS/HR


 


 Sodium Chloride


 100 meq/Potassium


 Phosphate 5 mmol/


 Magnesium Sulfate


 12 meq/Calcium


 Gluconate 15 meq/


 Multivitamins 10


 ml/Chromium/


 Copper/Manganese/


 Seleni/Zn 0.5 ml/


 Insulin Human


 Regular 35 unit/


 Potassium


 Chloride 20 meq/


 Total Parenteral


 Nutrition/Amino


 Acids/Dextrose/


 Fat Emulsion


 Intravenous  1,400 ml @ 


 58.333 mls/


 hr  TPN  CONT  4/17/20 22:00


 4/18/20 21:59 DC 4/17/20 22:59


58.333 MLS/HR


 


 Succinylcholine


 Chloride


  (Anectine)  120 mg  1X  ONCE  3/23/20 08:30


 3/23/20 08:31 DC 3/23/20 08:34


120 MG


 


 Vecuronium Bromide


  (Norcuron Bolus)  6 mg  PRN Q6HRS  PRN  5/7/20 19:15


 5/7/20 19:35 DC  











Lab





Laboratory Tests








Test


 5/7/20


17:57 5/8/20


00:26 5/8/20


05:45 5/8/20


05:46


 


Glucose (Fingerstick)


 152 mg/dL


(70-99) 134 mg/dL


(70-99) 


 142 mg/dL


(70-99)


 


White Blood Count


 


 


 10.4 x10^3/uL


(4.0-11.0) 





 


Red Blood Count


 


 


 2.69 x10^6/uL


(3.50-5.40) 





 


Hemoglobin


 


 


 7.8 g/dL


(12.0-15.5) 





 


Hematocrit


 


 


 24.3 %


(36.0-47.0) 





 


Mean Corpuscular Volume   91 fL ()  


 


Mean Corpuscular Hemoglobin   29 pg (25-35)  


 


Mean Corpuscular Hemoglobin


Concent 


 


 32 g/dL


(31-37) 





 


Red Cell Distribution Width


 


 


 17.5 %


(11.5-14.5) 





 


Platelet Count


 


 


 408 x10^3/uL


(140-400) 





 


Neutrophils (%) (Auto)   78 % (31-73)  


 


Lymphocytes (%) (Auto)   15 % (24-48)  


 


Monocytes (%) (Auto)   5 % (0-9)  


 


Eosinophils (%) (Auto)   2 % (0-3)  


 


Basophils (%) (Auto)   0 % (0-3)  


 


Neutrophils # (Auto)


 


 


 8.1 x10^3/uL


(1.8-7.7) 





 


Lymphocytes # (Auto)


 


 


 1.5 x10^3/uL


(1.0-4.8) 





 


Monocytes # (Auto)


 


 


 0.5 x10^3/uL


(0.0-1.1) 





 


Eosinophils # (Auto)


 


 


 0.2 x10^3/uL


(0.0-0.7) 





 


Basophils # (Auto)


 


 


 0.0 x10^3/uL


(0.0-0.2) 





 


Sodium Level


 


 


 149 mmol/L


(136-145) 





 


Potassium Level


 


 


 4.0 mmol/L


(3.5-5.1) 





 


Chloride Level


 


 


 112 mmol/L


() 





 


Carbon Dioxide Level


 


 


 31 mmol/L


(21-32) 





 


Anion Gap   6 (6-14)  


 


Blood Urea Nitrogen


 


 


 37 mg/dL


(7-20) 





 


Creatinine


 


 


 0.8 mg/dL


(0.6-1.0) 





 


Estimated GFR


(Cockcroft-Gault) 


 


 76.2 


 





 


BUN/Creatinine Ratio   46 (6-20)  


 


Glucose Level


 


 


 156 mg/dL


(70-99) 





 


Calcium Level


 


 


 8.9 mg/dL


(8.5-10.1) 





 


Total Bilirubin


 


 


 0.5 mg/dL


(0.2-1.0) 





 


Aspartate Amino Transf


(AST/SGOT) 


 


 42 U/L (15-37) 


 





 


Alanine Aminotransferase


(ALT/SGPT) 


 


 39 U/L (14-59) 


 





 


Alkaline Phosphatase


 


 


 91 U/L


() 





 


Total Protein


 


 


 5.8 g/dL


(6.4-8.2) 





 


Albumin


 


 


 1.7 g/dL


(3.4-5.0) 





 


Albumin/Globulin Ratio   0.4 (1.0-1.7)  


 


Triglycerides Level


 


 


 199 mg/dL


(0-150) 





 


Test


 5/8/20


12:36 


 


 





 


Glucose (Fingerstick)


 151 mg/dL


(70-99) 


 


 











Results


All relevant outside records, renal labs, imaging studies, telemetry/EKG's were 

reviewed.











KIMBERLY MYERS MD                 May 8, 2020 13:18

## 2020-05-08 NOTE — NUR
SS following up with discharge planning. SS discussed with RN, Frandy. Pt had drains placed 
today and has output. Pt has NG tube now. Pt on trach collar, TPN, and IV antibiotics. Pt 
NPO and not tolerating speaking valve. Pt is self pay pt. SS discussed with Missy in [facility]. 
Pt's daughter, Glenna, came to hospital on 5/7/2020 and worked on disability packet. Pt's 
daughter needing to provide some additional documentation prior to completing. SS will 
continue to follow for discharge planning.

## 2020-05-08 NOTE — PDOC
SURGICAL PROGRESS NOTE


Subjective


Tom for Dr Flores


pt seen in ICU


mother at bedside


Vital Signs





Vital Signs








  Date Time  Temp Pulse Resp B/P (MAP) Pulse Ox O2 Delivery O2 Flow Rate FiO2


 


5/8/20 12:47   26  97 Ventilator  


 


5/8/20 12:00  78  113/58 (76)    


 


5/8/20 10:17       15.0 


 


5/8/20 08:00 97.5       





 97.5       








I&O











Intake and Output 


 


 5/8/20





 06:59


 


Intake Total 3985.1 ml


 


Output Total 4075 ml


 


Balance -89.9 ml


 


 


 


IV Total 3285.1 ml


 


Other 700 ml


 


Output Urine Total 3875 ml


 


Gastric Drainage Total 200 ml








PATIENT HAS A VILLASENOR:  Yes (accurate I and O)


Lungs:  Other (vent support via trach)


Abdomen:  Soft


Labs





Laboratory Tests








Test


 5/6/20


16:40 5/6/20


17:30 5/7/20


00:09 5/7/20


05:00


 


Hemoglobin


 7.8 g/dL


(12.0-15.5) 


 


 7.1 g/dL


(12.0-15.5)


 


Hematocrit


 24.1 %


(36.0-47.0) 


 


 21.9 %


(36.0-47.0)


 


Mean Corpuscular Hemoglobin


Concent 32 g/dL


(31-37) 


 


 33 g/dL


(31-37)


 


Glucose (Fingerstick)


 


 108 mg/dL


(70-99) 100 mg/dL


(70-99) 139 mg/dL


(70-99)


 


White Blood Count


 


 


 


 9.1 x10^3/uL


(4.0-11.0)


 


Red Blood Count


 


 


 


 2.44 x10^6/uL


(3.50-5.40)


 


Mean Corpuscular Volume    90 fL () 


 


Mean Corpuscular Hemoglobin    29 pg (25-35) 


 


Red Cell Distribution Width


 


 


 


 17.5 %


(11.5-14.5)


 


Platelet Count


 


 


 


 382 x10^3/uL


(140-400)


 


Neutrophils (%) (Auto)    73 % (31-73) 


 


Lymphocytes (%) (Auto)    21 % (24-48) 


 


Monocytes (%) (Auto)    5 % (0-9) 


 


Eosinophils (%) (Auto)    1 % (0-3) 


 


Basophils (%) (Auto)    0 % (0-3) 


 


Neutrophils # (Auto)


 


 


 


 6.6 x10^3/uL


(1.8-7.7)


 


Lymphocytes # (Auto)


 


 


 


 1.9 x10^3/uL


(1.0-4.8)


 


Monocytes # (Auto)


 


 


 


 0.5 x10^3/uL


(0.0-1.1)


 


Eosinophils # (Auto)


 


 


 


 0.1 x10^3/uL


(0.0-0.7)


 


Basophils # (Auto)


 


 


 


 0.0 x10^3/uL


(0.0-0.2)


 


Sodium Level


 


 


 


 152 mmol/L


(136-145)


 


Potassium Level


 


 


 


 4.2 mmol/L


(3.5-5.1)


 


Chloride Level


 


 


 


 116 mmol/L


()


 


Carbon Dioxide Level


 


 


 


 31 mmol/L


(21-32)


 


Anion Gap    5 (6-14) 


 


Blood Urea Nitrogen


 


 


 


 39 mg/dL


(7-20)


 


Creatinine


 


 


 


 1.0 mg/dL


(0.6-1.0)


 


Estimated GFR


(Cockcroft-Gault) 


 


 


 58.9 





 


Glucose Level


 


 


 


 153 mg/dL


(70-99)


 


Calcium Level


 


 


 


 8.4 mg/dL


(8.5-10.1)


 


Phosphorus Level


 


 


 


 3.3 mg/dL


(2.6-4.7)


 


Magnesium Level


 


 


 


 2.0 mg/dL


(1.8-2.4)


 


Test


 5/7/20


11:44 5/7/20


17:57 5/8/20


00:26 5/8/20


05:45


 


Glucose (Fingerstick)


 150 mg/dL


(70-99) 152 mg/dL


(70-99) 134 mg/dL


(70-99) 





 


White Blood Count


 


 


 


 10.4 x10^3/uL


(4.0-11.0)


 


Red Blood Count


 


 


 


 2.69 x10^6/uL


(3.50-5.40)


 


Hemoglobin


 


 


 


 7.8 g/dL


(12.0-15.5)


 


Hematocrit


 


 


 


 24.3 %


(36.0-47.0)


 


Mean Corpuscular Volume    91 fL () 


 


Mean Corpuscular Hemoglobin    29 pg (25-35) 


 


Mean Corpuscular Hemoglobin


Concent 


 


 


 32 g/dL


(31-37)


 


Red Cell Distribution Width


 


 


 


 17.5 %


(11.5-14.5)


 


Platelet Count


 


 


 


 408 x10^3/uL


(140-400)


 


Neutrophils (%) (Auto)    78 % (31-73) 


 


Lymphocytes (%) (Auto)    15 % (24-48) 


 


Monocytes (%) (Auto)    5 % (0-9) 


 


Eosinophils (%) (Auto)    2 % (0-3) 


 


Basophils (%) (Auto)    0 % (0-3) 


 


Neutrophils # (Auto)


 


 


 


 8.1 x10^3/uL


(1.8-7.7)


 


Lymphocytes # (Auto)


 


 


 


 1.5 x10^3/uL


(1.0-4.8)


 


Monocytes # (Auto)


 


 


 


 0.5 x10^3/uL


(0.0-1.1)


 


Eosinophils # (Auto)


 


 


 


 0.2 x10^3/uL


(0.0-0.7)


 


Basophils # (Auto)


 


 


 


 0.0 x10^3/uL


(0.0-0.2)


 


Sodium Level


 


 


 


 149 mmol/L


(136-145)


 


Potassium Level


 


 


 


 4.0 mmol/L


(3.5-5.1)


 


Chloride Level


 


 


 


 112 mmol/L


()


 


Carbon Dioxide Level


 


 


 


 31 mmol/L


(21-32)


 


Anion Gap    6 (6-14) 


 


Blood Urea Nitrogen


 


 


 


 37 mg/dL


(7-20)


 


Creatinine


 


 


 


 0.8 mg/dL


(0.6-1.0)


 


Estimated GFR


(Cockcroft-Gault) 


 


 


 76.2 





 


BUN/Creatinine Ratio    46 (6-20) 


 


Glucose Level


 


 


 


 156 mg/dL


(70-99)


 


Calcium Level


 


 


 


 8.9 mg/dL


(8.5-10.1)


 


Total Bilirubin


 


 


 


 0.5 mg/dL


(0.2-1.0)


 


Aspartate Amino Transf


(AST/SGOT) 


 


 


 42 U/L (15-37) 





 


Alanine Aminotransferase


(ALT/SGPT) 


 


 


 39 U/L (14-59) 





 


Alkaline Phosphatase


 


 


 


 91 U/L


()


 


Total Protein


 


 


 


 5.8 g/dL


(6.4-8.2)


 


Albumin


 


 


 


 1.7 g/dL


(3.4-5.0)


 


Albumin/Globulin Ratio    0.4 (1.0-1.7) 


 


Triglycerides Level


 


 


 


 199 mg/dL


(0-150)


 


Test


 5/8/20


05:46 5/8/20


12:36 


 





 


Glucose (Fingerstick)


 142 mg/dL


(70-99) 151 mg/dL


(70-99) 


 











Laboratory Tests








Test


 5/7/20


17:57 5/8/20


00:26 5/8/20


05:45 5/8/20


05:46


 


Glucose (Fingerstick)


 152 mg/dL


(70-99) 134 mg/dL


(70-99) 


 142 mg/dL


(70-99)


 


White Blood Count


 


 


 10.4 x10^3/uL


(4.0-11.0) 





 


Red Blood Count


 


 


 2.69 x10^6/uL


(3.50-5.40) 





 


Hemoglobin


 


 


 7.8 g/dL


(12.0-15.5) 





 


Hematocrit


 


 


 24.3 %


(36.0-47.0) 





 


Mean Corpuscular Volume   91 fL ()  


 


Mean Corpuscular Hemoglobin   29 pg (25-35)  


 


Mean Corpuscular Hemoglobin


Concent 


 


 32 g/dL


(31-37) 





 


Red Cell Distribution Width


 


 


 17.5 %


(11.5-14.5) 





 


Platelet Count


 


 


 408 x10^3/uL


(140-400) 





 


Neutrophils (%) (Auto)   78 % (31-73)  


 


Lymphocytes (%) (Auto)   15 % (24-48)  


 


Monocytes (%) (Auto)   5 % (0-9)  


 


Eosinophils (%) (Auto)   2 % (0-3)  


 


Basophils (%) (Auto)   0 % (0-3)  


 


Neutrophils # (Auto)


 


 


 8.1 x10^3/uL


(1.8-7.7) 





 


Lymphocytes # (Auto)


 


 


 1.5 x10^3/uL


(1.0-4.8) 





 


Monocytes # (Auto)


 


 


 0.5 x10^3/uL


(0.0-1.1) 





 


Eosinophils # (Auto)


 


 


 0.2 x10^3/uL


(0.0-0.7) 





 


Basophils # (Auto)


 


 


 0.0 x10^3/uL


(0.0-0.2) 





 


Sodium Level


 


 


 149 mmol/L


(136-145) 





 


Potassium Level


 


 


 4.0 mmol/L


(3.5-5.1) 





 


Chloride Level


 


 


 112 mmol/L


() 





 


Carbon Dioxide Level


 


 


 31 mmol/L


(21-32) 





 


Anion Gap   6 (6-14)  


 


Blood Urea Nitrogen


 


 


 37 mg/dL


(7-20) 





 


Creatinine


 


 


 0.8 mg/dL


(0.6-1.0) 





 


Estimated GFR


(Cockcroft-Gault) 


 


 76.2 


 





 


BUN/Creatinine Ratio   46 (6-20)  


 


Glucose Level


 


 


 156 mg/dL


(70-99) 





 


Calcium Level


 


 


 8.9 mg/dL


(8.5-10.1) 





 


Total Bilirubin


 


 


 0.5 mg/dL


(0.2-1.0) 





 


Aspartate Amino Transf


(AST/SGOT) 


 


 42 U/L (15-37) 


 





 


Alanine Aminotransferase


(ALT/SGPT) 


 


 39 U/L (14-59) 


 





 


Alkaline Phosphatase


 


 


 91 U/L


() 





 


Total Protein


 


 


 5.8 g/dL


(6.4-8.2) 





 


Albumin


 


 


 1.7 g/dL


(3.4-5.0) 





 


Albumin/Globulin Ratio   0.4 (1.0-1.7)  


 


Triglycerides Level


 


 


 199 mg/dL


(0-150) 





 


Test


 5/8/20


12:36 


 


 





 


Glucose (Fingerstick)


 151 mg/dL


(70-99) 


 


 











I have reviewed the following


results of CT guided placement abdominal drains noted


Problem List


Problems


Medical Problems:


(1) Acute pancreatitis


Status: Acute  





(2) Cholelithiasis


Status: Acute  








Assessment/Plan


no new surgical recs


talked with her mom, questions answered











KIEL BAL MD                 May 8, 2020 14:25

## 2020-05-08 NOTE — NUR
3 drains placed in Interventional Radiology, draining off much fluid (see intake and output) 
and awaiting drainage to slow down in order to make the drains manageable once she returns 
to CVICU. Patient tolerating well.

## 2020-05-08 NOTE — PDOC
MODERATE SEDATION ASSESSMENT


RISKS/ALTERNATIVES


Risks/Alternatives


Risks and alternatives of this type of sedation and procedure discussed with:


RISK/ALTERNATIVES:  Patient





H & P ON CHART


H & P


H & P on chart and reviewed for co-morbid conditions and appropriate labs.


H&P ON CHART:  Yes





PREGNANCY STATUS


PREG STATUS ASSESSED:  Yes





MEDS/ALLERGIES REVIEWED


Meds/Allergies Reviewed


Medications and Allergies including time and route of recently administered 

narcotics and sedatives.


MEDS/ALLERGIES REVIEWED:  Yes





ASA RATING


ASA RATING:  IV





AIRWAY ASSESSMENT


Airway Assessment


Airway patency, oral function limitations, presence  of caps, crowns, dentures, 

partials, and ability to extend neck assessed.


AIRWAY ASSESSMENT:  No





MALLAMPATI SCORE


MALLAMPATI SCORE:  II





PRE-SEDATION ASSESSMENT


PRE-SEDATION ASSESSMENT:  Yes











JOSÉ LUIS PHELAN MD               May 8, 2020 10:17

## 2020-05-08 NOTE — PDOC
Infectious Disease Note


Subjective


Subjective





NoVomiting but some nausea and some pain


on trach with vent


Discussed with JAKUB LINARES


o/w neg





Vital Sign


Vital Signs





Vital Signs








  Date Time  Temp Pulse Resp B/P (MAP) Pulse Ox O2 Delivery O2 Flow Rate FiO2


 


5/8/20 08:00      Mechanical Ventilator  


 


5/8/20 08:00 97.5 104 30 150/85 (106) 95  8.0 





 97.5       











Physical Exam


PHYSICAL EXAM


GENERAL: Propped up in bed. Alert and coop.  Looks a little tired


HEENT:  oral cavity dry, NGT


NECK:  Trach/vent 


LUNGS: rhonchi 


HEART:  S1, S2, regular 


ABDOMEN: Distended, hypoactive BS, drain placement (4/27 - serous fluid)


: [place] (4/14)


EXTREMITIES: Generalized edema, no cyanosis, SCDs bilaterally 


DERMATOLOGIC:  Warm and dry.  No generalized rash.  


CENTRAL NERVOUS SYSTEM: weak, mouthing words to simple questions 


PICC line in place April 30, 2020 clean


HDC has been removed


[facility] removed





Labs


Lab





Laboratory Tests








Test


 5/7/20


11:44 5/7/20


17:57 5/8/20


00:26 5/8/20


05:45


 


Glucose (Fingerstick)


 150 mg/dL


(70-99) 152 mg/dL


(70-99) 134 mg/dL


(70-99) 





 


White Blood Count


 


 


 


 10.4 x10^3/uL


(4.0-11.0)


 


Red Blood Count


 


 


 


 2.69 x10^6/uL


(3.50-5.40)


 


Hemoglobin


 


 


 


 7.8 g/dL


(12.0-15.5)


 


Hematocrit


 


 


 


 24.3 %


(36.0-47.0)


 


Mean Corpuscular Volume    91 fL () 


 


Mean Corpuscular Hemoglobin    29 pg (25-35) 


 


Mean Corpuscular Hemoglobin


Concent 


 


 


 32 g/dL


(31-37)


 


Red Cell Distribution Width


 


 


 


 17.5 %


(11.5-14.5)


 


Platelet Count


 


 


 


 408 x10^3/uL


(140-400)


 


Neutrophils (%) (Auto)    78 % (31-73) 


 


Lymphocytes (%) (Auto)    15 % (24-48) 


 


Monocytes (%) (Auto)    5 % (0-9) 


 


Eosinophils (%) (Auto)    2 % (0-3) 


 


Basophils (%) (Auto)    0 % (0-3) 


 


Neutrophils # (Auto)


 


 


 


 8.1 x10^3/uL


(1.8-7.7)


 


Lymphocytes # (Auto)


 


 


 


 1.5 x10^3/uL


(1.0-4.8)


 


Monocytes # (Auto)


 


 


 


 0.5 x10^3/uL


(0.0-1.1)


 


Eosinophils # (Auto)


 


 


 


 0.2 x10^3/uL


(0.0-0.7)


 


Basophils # (Auto)


 


 


 


 0.0 x10^3/uL


(0.0-0.2)


 


Sodium Level


 


 


 


 149 mmol/L


(136-145)


 


Potassium Level


 


 


 


 4.0 mmol/L


(3.5-5.1)


 


Chloride Level


 


 


 


 112 mmol/L


()


 


Carbon Dioxide Level


 


 


 


 31 mmol/L


(21-32)


 


Anion Gap    6 (6-14) 


 


Blood Urea Nitrogen


 


 


 


 37 mg/dL


(7-20)


 


Creatinine


 


 


 


 0.8 mg/dL


(0.6-1.0)


 


Estimated GFR


(Cockcroft-Gault) 


 


 


 76.2 





 


BUN/Creatinine Ratio    46 (6-20) 


 


Glucose Level


 


 


 


 156 mg/dL


(70-99)


 


Calcium Level


 


 


 


 8.9 mg/dL


(8.5-10.1)


 


Total Bilirubin


 


 


 


 0.5 mg/dL


(0.2-1.0)


 


Aspartate Amino Transf


(AST/SGOT) 


 


 


 42 U/L (15-37) 





 


Alanine Aminotransferase


(ALT/SGPT) 


 


 


 39 U/L (14-59) 





 


Alkaline Phosphatase


 


 


 


 91 U/L


()


 


Total Protein


 


 


 


 5.8 g/dL


(6.4-8.2)


 


Albumin


 


 


 


 1.7 g/dL


(3.4-5.0)


 


Albumin/Globulin Ratio    0.4 (1.0-1.7) 


 


Triglycerides Level


 


 


 


 199 mg/dL


(0-150)


 


Test


 5/8/20


05:46 


 


 





 


Glucose (Fingerstick)


 142 mg/dL


(70-99) 


 


 











Micro


U/S 5/4





IMPRESSION: Complex fluid collections within the right and lower 


quadrants. The abdominal visceral and vascular structures are not formally


assessed on this exam.





CT Scan 5/4





IMPRESSION:


 





1. Findings consistent with sequelae of severe acute pancreatitis with 


enlarging peripancreatic and intra-abdominal fluid collections as 


described


 


2. Interval placement of a drain in the ventral abdominal wall with and 


partial evacuation of the ventral extraperitoneal fluid collection 


previously evident.











4/27


Operative Note:


After obtaining informed consent, patient was taken to OR, induced under GETA 

and prepped in the usual fashion.  5 mm port placed umbilical and right mid 

abdomen, all under laparoscopic guidance.  Large amount of ascites encountered 

and aspirated off.  Fluid was clear with some whitish debris.  Viscera was 

completely locked in with obliteration of all planes, preventing any significant

exploration.  Copious irrigation.





Given patient's overall clinical improvement, favor against open procedure and 

attempt at cholecystectomy and/or necrosectomy, given high risk of 

complications.  Cholecystectomy will need to be performed, but favor waiting 3 

months.





19 ROBERT drain placed and secured with 3 0 nylon.  Skin repaired with 4 0 monocryl.

 Dressing placed.





Patient tolerated procedure well and sent to PACU in stable condition.  All 

counts correct.  Wound class is 4.
































CT Chest Abd/pelv 4/14


CT CHEST:


CT scan the chest was done without contrast. There is a tracheostomy tube 


in good position. There are large bilateral pleural effusions. There is 


atelectasis in both lung bases. There is no mediastinal adenopathy. Right 


arm PICC line extends to the superior vena cava. There is a temporary 


dialysis catheter extending to the vena cava near the atrium.


 


IMPRESSION:


1. Large bilateral effusions.


2. Atelectasis of both lungs.


 


End impression


 


CT abdomen pelvis


 


CT scan the abdomen pelvis was done following CT chest with contrast. NG 


tube extends into the stomach. Spleen is normal. There is no mass or 


hydronephrosis in the kidneys. There is a gallstone the gallbladder. A 


focal liver lesion is not identified. There is diffuse necrosis of the 


pancreas. There is peripancreatic fluid. The pattern is similar to the 


recent study. There is fluid in the paracolic gutters. There is 


retroperitoneal fluid surrounding the kidneys. Ascites extends into the 


pelvis. Ascites is similar to the prior study. There is no bowel 


obstruction. There is no free air.


 


IMPRESSION:


1. Diffuse pancreatic necrosis.


2. A extensive retroperitoneal fluid and inflammation.


3. Cholelithiasis.


4. Moderate to large volume ascites with little change.



































CT A/P, 4/9


IMPRESSION:


1. Increased ascites.


2. 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


at the pancreas


3. Cholelithiasis with thickening of the gallbladder wall.


4. Persistent pleural effusions and atelectasis in the lung bases.





Objective


Assessment


Fever  - better currently - intermittent could be from underlying pancreatitis 

blood cults 5/4 - neg so far


? Ileus with vomiting


Abd distention - U/S and CT reviewed s/p 0.4 L of opaque, debris-containing 

ascites was removed 5/6


Acute pancreatitis with persistent  necrosis


CT a/p 4/9


    Increased ascites. Persistent evidence of necrotizing pancreatitis with 

fluid and phlegmon


   at the pancreas


   4/27 status post ROBERT drain placement - C paropsilosis on cult; Cult line tip 4 /30 - neg


Anemia - S/p PRBCs


Cholelithiasis with thickening of the gallbladder wall.


Leucocytosis improving


JED,Hyperkalemia, Metabolic acidosis off dialysis


Acute hypoxic resp failure ,bilateral pleural effusion and atelectasis


hypocalcemia 


Prediabetes


HTN


s/p trach





Plan


Plan of Care





Appreciate IR for abd fluid drainage given fever and discomfort 5/6


To have more drains this am


NGT to be placed with vomiting today


Labs in am


F/u cults from 5/6 abd


Blood cult times 2 - 5/4 neg so far


Added Micafungin 5/4 with yeast from 4/27 abd cult will have it worked up and 

sensitivity


Dose Dapto 5/4





cont merrem (4/8) - try to wean soon but await cults 5/6


Electrolytes per primary 


Anemia per primary


Maintain aspiration precaution


PT and OT as tolerated


Continue supportive care





D/w nursing











RASHAWN ROSEN MD               May 8, 2020 08:58

## 2020-05-08 NOTE — PDOC
BRIEF OPERATIVE NOTE


Pre-Op Diagnosis


Pancreatitis with pseudocysts, suspected infection


Post-Op Diagnosis


same


Procedure Performed


CT abdominal Drains x 3


Surgeon


Hardy


Anesthesia Type:  Conscious Sedation


Findings


3 abdominal drains, 14F, with turbid pancreatic fluid and necrotic debris in 

each.


Complications


No immediate











JOSÉ LUIS PHELAN MD               May 8, 2020 10:18

## 2020-05-08 NOTE — NUR
Pharmacy TPN Dosing Note



S: JESENIA RICH is a 49 year old F Currently receiving Central Continuous TPN started 
03/18/20



B:Pertinent PMH: Necrotizing pancreatitis



Height: 5 feet, 8 inches

Weight: 107.0 kg



Current diet: NPO 



LABS:

Sodium:    149 

Potassium: 4 

Chloride:  112 

Calcium:   8.9 

Corrected Calcium: 10.74 

Magnesium: 2 

CO2:       31 

SCr:       0.8 

Glucose:   156, 142 

Albumin:   1.7 

AST:       24 

ALT:       39 



TPN FORMULA:

TPN TYPE:  Central Continuous

AMINO ACIDS:         90 gm

DEXTROSE:            225 gm

LIPIDS:              30 gm

POTASSIUM CHLORIDE:  75 mEq

MAGNESIUM:           15 mEq

INSULIN:             15 units

MULTIPLE VITAMIN:    10 ml

TRACE ELEMENTS:      0.5 ml



TPN PLAN:  

-Corrected calcium elevated - remove calcium from TPN.

-Blood glucose within goal range after insulin reduction.

-Triglycerides 199 today, continue with same lipid formula.

-BMP tomorrow.





R: Continue TPN @ current rate and with above formula. 

Will monitor electrolytes, glucose, and tolerance to TPN.



 VALERIE SNELL [facility], 05/08/20 [id number]

## 2020-05-09 VITALS — SYSTOLIC BLOOD PRESSURE: 109 MMHG | DIASTOLIC BLOOD PRESSURE: 72 MMHG

## 2020-05-09 VITALS — DIASTOLIC BLOOD PRESSURE: 80 MMHG | SYSTOLIC BLOOD PRESSURE: 171 MMHG

## 2020-05-09 VITALS — DIASTOLIC BLOOD PRESSURE: 61 MMHG | SYSTOLIC BLOOD PRESSURE: 131 MMHG

## 2020-05-09 VITALS — DIASTOLIC BLOOD PRESSURE: 77 MMHG | SYSTOLIC BLOOD PRESSURE: 127 MMHG

## 2020-05-09 VITALS — SYSTOLIC BLOOD PRESSURE: 132 MMHG | DIASTOLIC BLOOD PRESSURE: 87 MMHG

## 2020-05-09 VITALS — DIASTOLIC BLOOD PRESSURE: 70 MMHG | SYSTOLIC BLOOD PRESSURE: 131 MMHG

## 2020-05-09 VITALS — DIASTOLIC BLOOD PRESSURE: 61 MMHG | SYSTOLIC BLOOD PRESSURE: 108 MMHG

## 2020-05-09 VITALS — SYSTOLIC BLOOD PRESSURE: 144 MMHG | DIASTOLIC BLOOD PRESSURE: 70 MMHG

## 2020-05-09 VITALS — DIASTOLIC BLOOD PRESSURE: 71 MMHG | SYSTOLIC BLOOD PRESSURE: 128 MMHG

## 2020-05-09 VITALS — DIASTOLIC BLOOD PRESSURE: 86 MMHG | SYSTOLIC BLOOD PRESSURE: 170 MMHG

## 2020-05-09 VITALS — SYSTOLIC BLOOD PRESSURE: 168 MMHG | DIASTOLIC BLOOD PRESSURE: 84 MMHG

## 2020-05-09 VITALS — DIASTOLIC BLOOD PRESSURE: 60 MMHG | SYSTOLIC BLOOD PRESSURE: 150 MMHG

## 2020-05-09 VITALS — DIASTOLIC BLOOD PRESSURE: 92 MMHG | SYSTOLIC BLOOD PRESSURE: 136 MMHG

## 2020-05-09 VITALS — SYSTOLIC BLOOD PRESSURE: 180 MMHG | DIASTOLIC BLOOD PRESSURE: 86 MMHG

## 2020-05-09 VITALS — DIASTOLIC BLOOD PRESSURE: 62 MMHG | SYSTOLIC BLOOD PRESSURE: 100 MMHG

## 2020-05-09 VITALS — SYSTOLIC BLOOD PRESSURE: 149 MMHG | DIASTOLIC BLOOD PRESSURE: 75 MMHG

## 2020-05-09 VITALS — SYSTOLIC BLOOD PRESSURE: 117 MMHG | DIASTOLIC BLOOD PRESSURE: 73 MMHG

## 2020-05-09 VITALS — DIASTOLIC BLOOD PRESSURE: 78 MMHG | SYSTOLIC BLOOD PRESSURE: 141 MMHG

## 2020-05-09 VITALS — SYSTOLIC BLOOD PRESSURE: 120 MMHG | DIASTOLIC BLOOD PRESSURE: 66 MMHG

## 2020-05-09 VITALS — SYSTOLIC BLOOD PRESSURE: 118 MMHG | DIASTOLIC BLOOD PRESSURE: 61 MMHG

## 2020-05-09 VITALS — SYSTOLIC BLOOD PRESSURE: 118 MMHG | DIASTOLIC BLOOD PRESSURE: 71 MMHG

## 2020-05-09 VITALS — SYSTOLIC BLOOD PRESSURE: 135 MMHG | DIASTOLIC BLOOD PRESSURE: 90 MMHG

## 2020-05-09 VITALS — DIASTOLIC BLOOD PRESSURE: 69 MMHG | SYSTOLIC BLOOD PRESSURE: 132 MMHG

## 2020-05-09 LAB
ANION GAP SERPL CALC-SCNC: 7 MMOL/L (ref 6–14)
BUN SERPL-MCNC: 33 MG/DL (ref 7–20)
CALCIUM SERPL-MCNC: 8.7 MG/DL (ref 8.5–10.1)
CHLORIDE SERPL-SCNC: 111 MMOL/L (ref 98–107)
CO2 SERPL-SCNC: 31 MMOL/L (ref 21–32)
CREAT SERPL-MCNC: 0.8 MG/DL (ref 0.6–1)
GFR SERPLBLD BASED ON 1.73 SQ M-ARVRAT: 76.2 ML/MIN
GLUCOSE SERPL-MCNC: 145 MG/DL (ref 70–99)
POTASSIUM SERPL-SCNC: 3.9 MMOL/L (ref 3.5–5.1)
SODIUM SERPL-SCNC: 149 MMOL/L (ref 136–145)

## 2020-05-09 RX ADMIN — Medication PRN EACH: at 13:28

## 2020-05-09 RX ADMIN — PROCHLORPERAZINE EDISYLATE PRN MG: 5 INJECTION INTRAMUSCULAR; INTRAVENOUS at 13:25

## 2020-05-09 RX ADMIN — HYDROMORPHONE HYDROCHLORIDE PRN MG: 2 INJECTION INTRAMUSCULAR; INTRAVENOUS; SUBCUTANEOUS at 15:30

## 2020-05-09 RX ADMIN — INSULIN LISPRO SCH UNITS: 100 INJECTION, SOLUTION INTRAVENOUS; SUBCUTANEOUS at 00:54

## 2020-05-09 RX ADMIN — DAPTOMYCIN SCH MLS/HR: 500 INJECTION, POWDER, LYOPHILIZED, FOR SOLUTION INTRAVENOUS at 13:26

## 2020-05-09 RX ADMIN — BUMETANIDE SCH MG: 0.25 INJECTION INTRAMUSCULAR; INTRAVENOUS at 11:24

## 2020-05-09 RX ADMIN — ONDANSETRON PRN MG: 2 INJECTION INTRAMUSCULAR; INTRAVENOUS at 07:55

## 2020-05-09 RX ADMIN — METOCLOPRAMIDE PRN MG: 5 INJECTION, SOLUTION INTRAMUSCULAR; INTRAVENOUS at 20:30

## 2020-05-09 RX ADMIN — MEROPENEM SCH MLS/HR: 1 INJECTION, POWDER, FOR SOLUTION INTRAVENOUS at 13:28

## 2020-05-09 RX ADMIN — MEROPENEM SCH MLS/HR: 1 INJECTION, POWDER, FOR SOLUTION INTRAVENOUS at 22:39

## 2020-05-09 RX ADMIN — DEXMEDETOMIDINE HYDROCHLORIDE PRN MLS/HR: 100 INJECTION, SOLUTION, CONCENTRATE INTRAVENOUS at 00:58

## 2020-05-09 RX ADMIN — ONDANSETRON PRN MG: 2 INJECTION INTRAMUSCULAR; INTRAVENOUS at 21:13

## 2020-05-09 RX ADMIN — INSULIN LISPRO SCH UNITS: 100 INJECTION, SOLUTION INTRAVENOUS; SUBCUTANEOUS at 13:36

## 2020-05-09 RX ADMIN — HYDROMORPHONE HYDROCHLORIDE PRN MG: 2 INJECTION INTRAMUSCULAR; INTRAVENOUS; SUBCUTANEOUS at 01:53

## 2020-05-09 RX ADMIN — MEROPENEM SCH MLS/HR: 1 INJECTION, POWDER, FOR SOLUTION INTRAVENOUS at 05:44

## 2020-05-09 RX ADMIN — DEXMEDETOMIDINE HYDROCHLORIDE PRN MLS/HR: 100 INJECTION, SOLUTION, CONCENTRATE INTRAVENOUS at 05:45

## 2020-05-09 RX ADMIN — BACITRACIN SCH MLS/HR: 5000 INJECTION, POWDER, FOR SOLUTION INTRAMUSCULAR at 02:30

## 2020-05-09 RX ADMIN — DEXMEDETOMIDINE HYDROCHLORIDE PRN MLS/HR: 100 INJECTION, SOLUTION, CONCENTRATE INTRAVENOUS at 18:26

## 2020-05-09 RX ADMIN — INSULIN LISPRO SCH UNITS: 100 INJECTION, SOLUTION INTRAVENOUS; SUBCUTANEOUS at 06:00

## 2020-05-09 RX ADMIN — DEXTROSE SCH MLS/HR: 50 INJECTION, SOLUTION INTRAVENOUS at 11:25

## 2020-05-09 RX ADMIN — HYDROMORPHONE HYDROCHLORIDE PRN MG: 2 INJECTION INTRAMUSCULAR; INTRAVENOUS; SUBCUTANEOUS at 22:40

## 2020-05-09 RX ADMIN — PANTOPRAZOLE SODIUM SCH MG: 40 INJECTION, POWDER, FOR SOLUTION INTRAVENOUS at 07:55

## 2020-05-09 NOTE — PDOC
SURGICAL PROGRESS NOTE


Subjective


sleeping


Vital Signs





Vital Signs








  Date Time  Temp Pulse Resp B/P (MAP) Pulse Ox O2 Delivery O2 Flow Rate FiO2


 


5/9/20 06:00  96 27 108/61 (77) 100 Ventilator  


 


5/9/20 04:00 97.3      8.0 





 97.3       








I&O











Intake and Output 


 


 5/9/20





 07:00


 


Intake Total 3144.6 ml


 


Output Total 6315 ml


 


Balance -3170.4 ml


 


 


 


IV Total 3144.6 ml


 


Output Urine Total 2795 ml


 


Drainage Total 1220 ml


 


Other 2300 ml








PATIENT HAS A VILLASENOR:  Yes


Abdomen:  Soft, Other (drains in place)


Labs





Laboratory Tests








Test


 5/7/20


11:44 5/7/20


17:57 5/8/20


00:26 5/8/20


05:45


 


Glucose (Fingerstick)


 150 mg/dL


(70-99) 152 mg/dL


(70-99) 134 mg/dL


(70-99) 





 


White Blood Count


 


 


 


 10.4 x10^3/uL


(4.0-11.0)


 


Red Blood Count


 


 


 


 2.69 x10^6/uL


(3.50-5.40)


 


Hemoglobin


 


 


 


 7.8 g/dL


(12.0-15.5)


 


Hematocrit


 


 


 


 24.3 %


(36.0-47.0)


 


Mean Corpuscular Volume    91 fL () 


 


Mean Corpuscular Hemoglobin    29 pg (25-35) 


 


Mean Corpuscular Hemoglobin


Concent 


 


 


 32 g/dL


(31-37)


 


Red Cell Distribution Width


 


 


 


 17.5 %


(11.5-14.5)


 


Platelet Count


 


 


 


 408 x10^3/uL


(140-400)


 


Neutrophils (%) (Auto)    78 % (31-73) 


 


Lymphocytes (%) (Auto)    15 % (24-48) 


 


Monocytes (%) (Auto)    5 % (0-9) 


 


Eosinophils (%) (Auto)    2 % (0-3) 


 


Basophils (%) (Auto)    0 % (0-3) 


 


Neutrophils # (Auto)


 


 


 


 8.1 x10^3/uL


(1.8-7.7)


 


Lymphocytes # (Auto)


 


 


 


 1.5 x10^3/uL


(1.0-4.8)


 


Monocytes # (Auto)


 


 


 


 0.5 x10^3/uL


(0.0-1.1)


 


Eosinophils # (Auto)


 


 


 


 0.2 x10^3/uL


(0.0-0.7)


 


Basophils # (Auto)


 


 


 


 0.0 x10^3/uL


(0.0-0.2)


 


Sodium Level


 


 


 


 149 mmol/L


(136-145)


 


Potassium Level


 


 


 


 4.0 mmol/L


(3.5-5.1)


 


Chloride Level


 


 


 


 112 mmol/L


()


 


Carbon Dioxide Level


 


 


 


 31 mmol/L


(21-32)


 


Anion Gap    6 (6-14) 


 


Blood Urea Nitrogen


 


 


 


 37 mg/dL


(7-20)


 


Creatinine


 


 


 


 0.8 mg/dL


(0.6-1.0)


 


Estimated GFR


(Cockcroft-Gault) 


 


 


 76.2 





 


BUN/Creatinine Ratio    46 (6-20) 


 


Glucose Level


 


 


 


 156 mg/dL


(70-99)


 


Calcium Level


 


 


 


 8.9 mg/dL


(8.5-10.1)


 


Total Bilirubin


 


 


 


 0.5 mg/dL


(0.2-1.0)


 


Aspartate Amino Transf


(AST/SGOT) 


 


 


 42 U/L (15-37) 





 


Alanine Aminotransferase


(ALT/SGPT) 


 


 


 39 U/L (14-59) 





 


Alkaline Phosphatase


 


 


 


 91 U/L


()


 


Total Protein


 


 


 


 5.8 g/dL


(6.4-8.2)


 


Albumin


 


 


 


 1.7 g/dL


(3.4-5.0)


 


Albumin/Globulin Ratio    0.4 (1.0-1.7) 


 


Triglycerides Level


 


 


 


 199 mg/dL


(0-150)


 


Test


 5/8/20


05:46 5/8/20


12:36 5/8/20


18:13 5/9/20


00:18


 


Glucose (Fingerstick)


 142 mg/dL


(70-99) 151 mg/dL


(70-99) 150 mg/dL


(70-99) 159 mg/dL


(70-99)


 


Test


 5/9/20


06:00 


 


 





 


Sodium Level


 149 mmol/L


(136-145) 


 


 





 


Potassium Level


 3.9 mmol/L


(3.5-5.1) 


 


 





 


Chloride Level


 111 mmol/L


() 


 


 





 


Carbon Dioxide Level


 31 mmol/L


(21-32) 


 


 





 


Anion Gap 7 (6-14)    


 


Blood Urea Nitrogen


 33 mg/dL


(7-20) 


 


 





 


Creatinine


 0.8 mg/dL


(0.6-1.0) 


 


 





 


Estimated GFR


(Cockcroft-Gault) 76.2 


 


 


 





 


Glucose Level


 145 mg/dL


(70-99) 


 


 





 


Glucose (Fingerstick)


 139 mg/dL


(70-99) 


 


 





 


Calcium Level


 8.7 mg/dL


(8.5-10.1) 


 


 











Laboratory Tests








Test


 5/8/20


12:36 5/8/20


18:13 5/9/20


00:18 5/9/20


06:00


 


Glucose (Fingerstick)


 151 mg/dL


(70-99) 150 mg/dL


(70-99) 159 mg/dL


(70-99) 139 mg/dL


(70-99)


 


Sodium Level


 


 


 


 149 mmol/L


(136-145)


 


Potassium Level


 


 


 


 3.9 mmol/L


(3.5-5.1)


 


Chloride Level


 


 


 


 111 mmol/L


()


 


Carbon Dioxide Level


 


 


 


 31 mmol/L


(21-32)


 


Anion Gap    7 (6-14) 


 


Blood Urea Nitrogen


 


 


 


 33 mg/dL


(7-20)


 


Creatinine


 


 


 


 0.8 mg/dL


(0.6-1.0)


 


Estimated GFR


(Cockcroft-Gault) 


 


 


 76.2 





 


Glucose Level


 


 


 


 145 mg/dL


(70-99)


 


Calcium Level


 


 


 


 8.7 mg/dL


(8.5-10.1)








Problem List


Problems


Medical Problems:


(1) Acute pancreatitis


Status: Acute  





(2) Cholelithiasis


Status: Acute  








Assessment/Plan


pancreatitis s/p l/s, drain placement





continue supportive care











KIEL BAL MD                 May 9, 2020 07:17

## 2020-05-09 NOTE — PDOC
PULMONARY PROGRESS NOTES


Subjective


Patient intubated on 3/23 , s/p trach 4/6, awake alert follows commands


On pressure support


Vitals





Vital Signs








  Date Time  Temp Pulse Resp B/P (MAP) Pulse Ox O2 Delivery O2 Flow Rate FiO2


 


5/9/20 07:37     100 Ventilator  


 


5/9/20 06:00  96 27 108/61 (77)    


 


5/9/20 04:00 97.3      8.0 





 97.3       








ROS:  No Nausea, No Chest Pain, No Abdominal Pain, No Increase Cough


General:  Alert, No acute distress


Lungs:  Crackles


Cardiovascular:  S1, S2


Abdomen:  Soft, Non-tender, Other (firm)


Neuro Exam:  Alert


Extremities:  Other (+3 generalized edema )


Skin:  Warm, Dry


Labs





Laboratory Tests








Test


 5/7/20


11:44 5/7/20


17:57 5/8/20


00:26 5/8/20


05:45


 


Glucose (Fingerstick)


 150 mg/dL


(70-99) 152 mg/dL


(70-99) 134 mg/dL


(70-99) 





 


White Blood Count


 


 


 


 10.4 x10^3/uL


(4.0-11.0)


 


Red Blood Count


 


 


 


 2.69 x10^6/uL


(3.50-5.40)


 


Hemoglobin


 


 


 


 7.8 g/dL


(12.0-15.5)


 


Hematocrit


 


 


 


 24.3 %


(36.0-47.0)


 


Mean Corpuscular Volume    91 fL () 


 


Mean Corpuscular Hemoglobin    29 pg (25-35) 


 


Mean Corpuscular Hemoglobin


Concent 


 


 


 32 g/dL


(31-37)


 


Red Cell Distribution Width


 


 


 


 17.5 %


(11.5-14.5)


 


Platelet Count


 


 


 


 408 x10^3/uL


(140-400)


 


Neutrophils (%) (Auto)    78 % (31-73) 


 


Lymphocytes (%) (Auto)    15 % (24-48) 


 


Monocytes (%) (Auto)    5 % (0-9) 


 


Eosinophils (%) (Auto)    2 % (0-3) 


 


Basophils (%) (Auto)    0 % (0-3) 


 


Neutrophils # (Auto)


 


 


 


 8.1 x10^3/uL


(1.8-7.7)


 


Lymphocytes # (Auto)


 


 


 


 1.5 x10^3/uL


(1.0-4.8)


 


Monocytes # (Auto)


 


 


 


 0.5 x10^3/uL


(0.0-1.1)


 


Eosinophils # (Auto)


 


 


 


 0.2 x10^3/uL


(0.0-0.7)


 


Basophils # (Auto)


 


 


 


 0.0 x10^3/uL


(0.0-0.2)


 


Sodium Level


 


 


 


 149 mmol/L


(136-145)


 


Potassium Level


 


 


 


 4.0 mmol/L


(3.5-5.1)


 


Chloride Level


 


 


 


 112 mmol/L


()


 


Carbon Dioxide Level


 


 


 


 31 mmol/L


(21-32)


 


Anion Gap    6 (6-14) 


 


Blood Urea Nitrogen


 


 


 


 37 mg/dL


(7-20)


 


Creatinine


 


 


 


 0.8 mg/dL


(0.6-1.0)


 


Estimated GFR


(Cockcroft-Gault) 


 


 


 76.2 





 


BUN/Creatinine Ratio    46 (6-20) 


 


Glucose Level


 


 


 


 156 mg/dL


(70-99)


 


Calcium Level


 


 


 


 8.9 mg/dL


(8.5-10.1)


 


Total Bilirubin


 


 


 


 0.5 mg/dL


(0.2-1.0)


 


Aspartate Amino Transf


(AST/SGOT) 


 


 


 42 U/L (15-37) 





 


Alanine Aminotransferase


(ALT/SGPT) 


 


 


 39 U/L (14-59) 





 


Alkaline Phosphatase


 


 


 


 91 U/L


()


 


Total Protein


 


 


 


 5.8 g/dL


(6.4-8.2)


 


Albumin


 


 


 


 1.7 g/dL


(3.4-5.0)


 


Albumin/Globulin Ratio    0.4 (1.0-1.7) 


 


Triglycerides Level


 


 


 


 199 mg/dL


(0-150)


 


Test


 5/8/20


05:46 5/8/20


12:36 5/8/20


18:13 5/9/20


00:18


 


Glucose (Fingerstick)


 142 mg/dL


(70-99) 151 mg/dL


(70-99) 150 mg/dL


(70-99) 159 mg/dL


(70-99)


 


Test


 5/9/20


06:00 


 


 





 


Sodium Level


 149 mmol/L


(136-145) 


 


 





 


Potassium Level


 3.9 mmol/L


(3.5-5.1) 


 


 





 


Chloride Level


 111 mmol/L


() 


 


 





 


Carbon Dioxide Level


 31 mmol/L


(21-32) 


 


 





 


Anion Gap 7 (6-14)    


 


Blood Urea Nitrogen


 33 mg/dL


(7-20) 


 


 





 


Creatinine


 0.8 mg/dL


(0.6-1.0) 


 


 





 


Estimated GFR


(Cockcroft-Gault) 76.2 


 


 


 





 


Glucose Level


 145 mg/dL


(70-99) 


 


 





 


Glucose (Fingerstick)


 139 mg/dL


(70-99) 


 


 





 


Calcium Level


 8.7 mg/dL


(8.5-10.1) 


 


 











Laboratory Tests








Test


 5/8/20


12:36 5/8/20


18:13 5/9/20


00:18 5/9/20


06:00


 


Glucose (Fingerstick)


 151 mg/dL


(70-99) 150 mg/dL


(70-99) 159 mg/dL


(70-99) 139 mg/dL


(70-99)


 


Sodium Level


 


 


 


 149 mmol/L


(136-145)


 


Potassium Level


 


 


 


 3.9 mmol/L


(3.5-5.1)


 


Chloride Level


 


 


 


 111 mmol/L


()


 


Carbon Dioxide Level


 


 


 


 31 mmol/L


(21-32)


 


Anion Gap    7 (6-14) 


 


Blood Urea Nitrogen


 


 


 


 33 mg/dL


(7-20)


 


Creatinine


 


 


 


 0.8 mg/dL


(0.6-1.0)


 


Estimated GFR


(Cockcroft-Gault) 


 


 


 76.2 





 


Glucose Level


 


 


 


 145 mg/dL


(70-99)


 


Calcium Level


 


 


 


 8.7 mg/dL


(8.5-10.1)








Medications





Active Scripts








 Medications  Dose


 Route/Sig


 Max Daily Dose Days Date Category


 


 Bisoprolol


 Fumarate 5 Mg


 Tablet  10 Mg


 PO DAILY


   3/16/20 Reported











Impression


.


IMPRESSION:


1.  Acute hypoxemic respiratory failure secondary to ARDS status post trach,


2.  Gallstone pancreatitis


3.  Severe metabolic acidosis.stable


4.  Acute kidney injury-stable, ON HD-- continue to improve 


5.  Acute gallstone pancreatitis.


6.  Hypoalbuminemia.


7.  Moderate persistent effusions


8.  Fever-  Per ID, per surgery


9.  Chronic anemia


10. Covid 19 testing negative


11. Moderate to large ascites-S/P paracentisis


12.S/P paracentisis with 4 liters removed on 4/15/20


13. S?P IR drain placement on 5/8/2020





Plan


.


Patient is very drowsy today, remains on PS- 


Follow surgery recs-- S/P 3 drain placed in IR on 5/8/2020


Follow ID recs for ABX


Follow nephrology recs 


Continue TPN 


DVT/GI PPX: heparin SQ/ protonix 


D/W RN and RT





CODE:STEVE WIGGINS MD               May 9, 2020 08:55

## 2020-05-09 NOTE — NUR
Pharmacy TPN Dosing Note



S: JESENIA RICH is a 49 year old F Currently receiving Central Continuous TPN started 
03/18/20



B:Pertinent PMH: Necrotizing pancreatitis

Height: 5 feet, 8 inches

Weight: 107.5 kg



Current diet: NPO 



LABS:

Sodium:    149 

Potassium: 3.9 

Chloride:  111 

Calcium:   8.7 

Corrected Calcium: 10.54 

Magnesium: 2 

CO2:       31 

SCr:       0.8 

Glucose:   139-159 

Albumin:   1.7 

AST:       24 

ALT:       39 



TPN FORMULA:

TPN TYPE:  Central Continuous

AMINO ACIDS:         90 gm

DEXTROSE:            225 gm

LIPIDS:              30 gm

POTASSIUM CHLORIDE:  75 mEq

MAGNESIUM:           15 mEq

INSULIN:             15 units

MULTIPLE VITAMIN:    10 ml

TRACE ELEMENTS:      0.5 ml(s)



TPN PLAN:  

No changes to TPN today. BG at goal.





R: Continue same TPN formulation as yesterday.

Will monitor electrolytes, glucose, and tolerance to TPN.



 LORENZO LESLIE RPH, 05/09/20 [id number]

## 2020-05-09 NOTE — NUR
1300 Pt coughing, gagging episode, green bile suctioned from trach, Dr Lozada at bedside. Dr Santos notified of occurence, orders to add [place]. Tom wants zofran, compazine, and 
reglan rotated Q3H. Will monitor closely. 



1500 Bile in trach has subsided. pt more comfortable, less nausea and gagging.

## 2020-05-09 NOTE — PDOC
Infectious Disease Note


Subjective


Subjective





+ abdominal pain


No fevers last 24 hrs


Remains on vent/trach FiO2 30%





Vital Sign


Vital Signs





Vital Signs








  Date Time  Temp Pulse Resp B/P (MAP) Pulse Ox O2 Delivery O2 Flow Rate FiO2


 


5/9/20 07:37     100 Ventilator  


 


5/9/20 06:00  96 27 108/61 (77)    


 


5/9/20 04:00 97.3      8.0 





 97.3       











Physical Exam


PHYSICAL EXAM


GENERAL: Propped up in bed, appears weak, tired


HEENT:  oral cavity dry, NGT


NECK:  Trach/vent 


LUNGS: rhonchi 


HEART:  S1, S2, regular 


ABDOMEN: Distended, hypoactive BS, tender, + drains 


: [place] (4/14)


EXTREMITIES: Generalized edema, no cyanosis, SCDs bilaterally 


SKIN: Warm and dry.  No generalized rash.  


CNS: mouthing words to simple questions 


PICC(4/30) clean





Labs


Lab





Laboratory Tests








Test


 5/8/20


12:36 5/8/20


18:13 5/9/20


00:18 5/9/20


06:00


 


Glucose (Fingerstick)


 151 mg/dL


(70-99) 150 mg/dL


(70-99) 159 mg/dL


(70-99) 139 mg/dL


(70-99)


 


Sodium Level


 


 


 


 149 mmol/L


(136-145)


 


Potassium Level


 


 


 


 3.9 mmol/L


(3.5-5.1)


 


Chloride Level


 


 


 


 111 mmol/L


()


 


Carbon Dioxide Level


 


 


 


 31 mmol/L


(21-32)


 


Anion Gap    7 (6-14) 


 


Blood Urea Nitrogen


 


 


 


 33 mg/dL


(7-20)


 


Creatinine


 


 


 


 0.8 mg/dL


(0.6-1.0)


 


Estimated GFR


(Cockcroft-Gault) 


 


 


 76.2 





 


Glucose Level


 


 


 


 145 mg/dL


(70-99)


 


Calcium Level


 


 


 


 8.7 mg/dL


(8.5-10.1)








Micro


5/4. BLOOD CULTURE  Preliminary  


        NO GROWTH AFTER 4 DAYS 








4/27. abd fluid


  AEROBIC RES 1  Final  


        Organism Identification, Yeast


          Candida parapsilosis





Objective


Assessment


Fever  - better currently - intermittent could be from underlying pancreatitis 

blood cults 5/4 - neg so far


? Ileus with vomiting


Abd distention - U/S and CT reviewed s/p 0.4 L of opaque, debris-containing 

ascites was removed 5/6


Acute pancreatitis with persistent necrosis


  - 4/27 status post ROBERT drain placement + C paropsilosis. s/p additional drains 

5/8


Anemia - S/p PRBCs


Cholelithiasis with thickening of the gallbladder wall.


Leucocytosis improving


JED, hyperkalemia, Metabolic acidosis off dialysis


Acute hypoxic resp failure ,bilateral pleural effusion and atelectasis


hypocalcemia 


Prediabetes


HTN


s/p trach





Plan


Plan of Care


Continue Dapto 5/4, merrem 4/8 -try to wean soon 


Continue micafungin


f/u cultures 


Maintain aspiration precaution


Supportive care





Critically ill 


D/w nursing


Patient seen and examined. Chart reviewed in detail. Case discussed with NP. 

Agree with above plan











HAVEN WINTERS         May 9, 2020 10:45


BISMARK HERNANDEZ MD              May 9, 2020 21:03

## 2020-05-09 NOTE — PDOC
PROGRESS NOTES


Subjective


Subjective


SEEN IN FOLLOW UP OF HYPERNATREMIA AND TPN





Objective


Objective





Vital Signs








  Date Time  Temp Pulse Resp B/P (MAP) Pulse Ox O2 Delivery O2 Flow Rate FiO2


 


5/9/20 12:00      Trach Collar  


 


5/9/20 11:45     99   


 


5/9/20 11:00  96 27 127/77 (94)    


 


5/9/20 08:00 98.1       





 98.1       


 


5/9/20 04:00       8.0 














Intake and Output 


 


 5/9/20





 07:00


 


Intake Total 3144.6 ml


 


Output Total 6315 ml


 


Balance -3170.4 ml


 


 


 


IV Total 3144.6 ml


 


Output Urine Total 2795 ml


 


Drainage Total 1220 ml


 


Other 2300 ml











Physical Exam


Abdomen:  Normal bowel sounds, Soft, No tenderness, No hepatosplenomegaly, No 

masses


Heart:  Regular rate, Normal S1, Normal S2, No murmurs, Gallops


Extremities:  No clubbing, No cyanosis, No edema, Normal pulses, No 

tenderness/swelling


General:  Alert, Oriented X3, Cooperative, No acute distress


Lungs:  Clear to auscultation, Normal air movement


Psych/Mental Status:  Other (ANXIOUS)





Diagnosis


Other


HYPERNATREMIA





Assessment


Assessment


Problems


Medical Problems:


(1) Acute pancreatitis


Status: Acute  





(2) Cholelithiasis


Status: Acute  








Plan


Plan of Care


WILL CONT TPN PER LAB. NO NACL IN TPN. INCREASE ACTIVITY AS TOLERATED.





Comment


Review of Relevant


I have reviewed the following items josy (where applicable) has been applied.


Labs





Laboratory Tests








Test


 5/7/20


17:57 5/8/20


00:26 5/8/20


05:45 5/8/20


05:46


 


Glucose (Fingerstick)


 152 mg/dL


(70-99) 134 mg/dL


(70-99) 


 142 mg/dL


(70-99)


 


White Blood Count


 


 


 10.4 x10^3/uL


(4.0-11.0) 





 


Red Blood Count


 


 


 2.69 x10^6/uL


(3.50-5.40) 





 


Hemoglobin


 


 


 7.8 g/dL


(12.0-15.5) 





 


Hematocrit


 


 


 24.3 %


(36.0-47.0) 





 


Mean Corpuscular Volume   91 fL ()  


 


Mean Corpuscular Hemoglobin   29 pg (25-35)  


 


Mean Corpuscular Hemoglobin


Concent 


 


 32 g/dL


(31-37) 





 


Red Cell Distribution Width


 


 


 17.5 %


(11.5-14.5) 





 


Platelet Count


 


 


 408 x10^3/uL


(140-400) 





 


Neutrophils (%) (Auto)   78 % (31-73)  


 


Lymphocytes (%) (Auto)   15 % (24-48)  


 


Monocytes (%) (Auto)   5 % (0-9)  


 


Eosinophils (%) (Auto)   2 % (0-3)  


 


Basophils (%) (Auto)   0 % (0-3)  


 


Neutrophils # (Auto)


 


 


 8.1 x10^3/uL


(1.8-7.7) 





 


Lymphocytes # (Auto)


 


 


 1.5 x10^3/uL


(1.0-4.8) 





 


Monocytes # (Auto)


 


 


 0.5 x10^3/uL


(0.0-1.1) 





 


Eosinophils # (Auto)


 


 


 0.2 x10^3/uL


(0.0-0.7) 





 


Basophils # (Auto)


 


 


 0.0 x10^3/uL


(0.0-0.2) 





 


Sodium Level


 


 


 149 mmol/L


(136-145) 





 


Potassium Level


 


 


 4.0 mmol/L


(3.5-5.1) 





 


Chloride Level


 


 


 112 mmol/L


() 





 


Carbon Dioxide Level


 


 


 31 mmol/L


(21-32) 





 


Anion Gap   6 (6-14)  


 


Blood Urea Nitrogen


 


 


 37 mg/dL


(7-20) 





 


Creatinine


 


 


 0.8 mg/dL


(0.6-1.0) 





 


Estimated GFR


(Cockcroft-Gault) 


 


 76.2 


 





 


BUN/Creatinine Ratio   46 (6-20)  


 


Glucose Level


 


 


 156 mg/dL


(70-99) 





 


Calcium Level


 


 


 8.9 mg/dL


(8.5-10.1) 





 


Total Bilirubin


 


 


 0.5 mg/dL


(0.2-1.0) 





 


Aspartate Amino Transf


(AST/SGOT) 


 


 42 U/L (15-37) 


 





 


Alanine Aminotransferase


(ALT/SGPT) 


 


 39 U/L (14-59) 


 





 


Alkaline Phosphatase


 


 


 91 U/L


() 





 


Total Protein


 


 


 5.8 g/dL


(6.4-8.2) 





 


Albumin


 


 


 1.7 g/dL


(3.4-5.0) 





 


Albumin/Globulin Ratio   0.4 (1.0-1.7)  


 


Triglycerides Level


 


 


 199 mg/dL


(0-150) 





 


Test


 5/8/20


12:36 5/8/20


18:13 5/9/20


00:18 5/9/20


06:00


 


Glucose (Fingerstick)


 151 mg/dL


(70-99) 150 mg/dL


(70-99) 159 mg/dL


(70-99) 139 mg/dL


(70-99)


 


Sodium Level


 


 


 


 149 mmol/L


(136-145)


 


Potassium Level


 


 


 


 3.9 mmol/L


(3.5-5.1)


 


Chloride Level


 


 


 


 111 mmol/L


()


 


Carbon Dioxide Level


 


 


 


 31 mmol/L


(21-32)


 


Anion Gap    7 (6-14) 


 


Blood Urea Nitrogen


 


 


 


 33 mg/dL


(7-20)


 


Creatinine


 


 


 


 0.8 mg/dL


(0.6-1.0)


 


Estimated GFR


(Cockcroft-Gault) 


 


 


 76.2 





 


Glucose Level


 


 


 


 145 mg/dL


(70-99)


 


Calcium Level


 


 


 


 8.7 mg/dL


(8.5-10.1)








Laboratory Tests








Test


 5/8/20


18:13 5/9/20


00:18 5/9/20


06:00


 


Glucose (Fingerstick)


 150 mg/dL


(70-99) 159 mg/dL


(70-99) 139 mg/dL


(70-99)


 


Sodium Level


 


 


 149 mmol/L


(136-145)


 


Potassium Level


 


 


 3.9 mmol/L


(3.5-5.1)


 


Chloride Level


 


 


 111 mmol/L


()


 


Carbon Dioxide Level


 


 


 31 mmol/L


(21-32)


 


Anion Gap   7 (6-14) 


 


Blood Urea Nitrogen


 


 


 33 mg/dL


(7-20)


 


Creatinine


 


 


 0.8 mg/dL


(0.6-1.0)


 


Estimated GFR


(Cockcroft-Gault) 


 


 76.2 





 


Glucose Level


 


 


 145 mg/dL


(70-99)


 


Calcium Level


 


 


 8.7 mg/dL


(8.5-10.1)








Microbiology


5/4/20 Blood Culture - Final, Complete


         NO GROWTH AFTER 5 DAYS


4/30/20 Aerobic Culture - Final, Complete


          


4/30/20 Aerobic Culture Result 1 ([facility]SON) - Final, Complete


          


4/30/20 Gram Stain - Final, Complete


          


4/30/20 Gram Stain Result 1 ([facility]SON) - Final, Complete


          


4/30/20 Gram Stain Result 2 ([facility]SON) - Final, Complete


          


4/27/20 Aerobic and Anaerobic Culture - Final, Complete


          


4/27/20 Anaerobic Culture Result 1 ([facility]SON) - Final, Complete


          


4/27/20 Aerobic Culture - Final, Complete


          


4/27/20 Aerobic Culture Result 1 ([facility]SON) - Final, Complete


          


4/27/20 Gram Stain - Final, Complete


          


4/27/20 Gram Stain Result 1 (ANSON) - Final, Complete


          


4/27/20 Gram Stain Result 2 ([facility]SON) - Final, Complete


          


4/12/20 Urine Culture - Final, Complete


          


4/12/20 Urine Culture Result 1 ([facility]SON) - Final, Complete


Medications





Current Medications


Sodium Chloride 1,000 ml @  1,000 mls/hr Q1H IV  Last administered on 3/16/20at 

03:00;  Start 3/16/20 at 03:00;  Stop 3/16/20 at 03:59;  Status DC


Ondansetron HCl (Zofran) 4 mg 1X  ONCE IVP  Last administered on 3/16/20at 

03:27;  Start 3/16/20 at 03:00;  Stop 3/16/20 at 03:01;  Status DC


Morphine Sulfate (Morphine Sulfate) 4 mg 1X  ONCE IV ;  Start 3/16/20 at 03:00; 

Stop 3/16/20 at 03:01;  Status Cancel


Ketorolac Tromethamine (Toradol 30mg Vial) 30 mg 1X  ONCE IV  Last administered 

on 3/16/20at 02:54;  Start 3/16/20 at 03:00;  Stop 3/16/20 at 03:01;  Status DC


Fentanyl Citrate (Fentanyl 2ml Vial) 25 mcg 1X  ONCE IVP  Last administered on 

3/16/20at 03:23;  Start 3/16/20 at 03:30;  Stop 3/16/20 at 03:31;  Status DC


Fentanyl Citrate (Fentanyl 2ml Vial) 100 mcg STK-MED ONCE .ROUTE ;  Start 

3/16/20 at 03:18;  Stop 3/16/20 at 03:18;  Status DC


Iohexol (Omnipaque 350 Mg/ml) 90 ml 1X  ONCE IV  Last administered on 3/16/20at 

03:25;  Start 3/16/20 at 03:30;  Stop 3/16/20 at 03:31;  Status DC


Info (CONTRAST GIVEN -- Rx MONITORING) 1 each PRN DAILY  PRN MC SEE COMMENTS;  

Start 3/16/20 at 03:30;  Stop 3/18/20 at 03:29;  Status DC


Hydromorphone HCl (Dilaudid) 0.5 mg 1X  ONCE IV  Last administered on 3/16/20at 

03:55;  Start 3/16/20 at 04:30;  Stop 3/16/20 at 04:32;  Status DC


Ondansetron HCl (Zofran) 4 mg PRN Q8HRS  PRN IV NAUSEA/VOMITING 1ST CHOICE;  

Start 3/16/20 at 05:00;  Stop 3/16/20 at 09:27;  Status DC


Morphine Sulfate (Morphine Sulfate) 2 mg PRN Q2HR  PRN IV SEVERE PAIN 7-10 Last 

administered on 3/17/20at 12:26;  Start 3/16/20 at 05:00;  Stop 3/17/20 at 1

4:15;  Status DC


Sodium Chloride 1,000 ml @  125 mls/hr Q8H IV  Last administered on 3/16/20at 

20:56;  Start 3/16/20 at 05:00;  Stop 3/17/20 at 04:59;  Status DC


Hydromorphone HCl (Dilaudid) 0.5 mg PRN Q3HRS  PRN IV SEVERE PAIN 7-10 Last 

administered on 3/17/20at 10:06;  Start 3/16/20 at 05:00;  Stop 3/17/20 at 

12:01;  Status DC


Piperacillin Sod/ Tazobactam Sod 4.5 gm/Sodium Chloride 100 ml @  200 mls/hr 1X 

ONCE IV  Last administered on 3/16/20at 05:44;  Start 3/16/20 at 06:00;  Stop 

3/16/20 at 06:29;  Status DC


Ondansetron HCl (Zofran) 4 mg PRN Q4HRS  PRN IV NAUSEA/VOMITING 1ST CHOICE Last 

administered on 5/9/20at 07:55;  Start 3/16/20 at 09:30


Insulin Human Lispro (HumaLOG) 0-9 UNITS Q6HRS SQ  Last administered on 5/9/20at

00:54;  Start 3/16/20 at 09:30


Dextrose (Dextrose 50%-Water Syringe) 12.5 gm PRN Q15MIN  PRN IV SEE COMMENTS;  

Start 3/16/20 at 09:30


Pantoprazole Sodium (PROTONIX VIAL for IV PUSH) 40 mg DAILYAC IVP  Last 

administered on 5/9/20at 07:55;  Start 3/16/20 at 11:30


Prochlorperazine Edisylate (Compazine) 10 mg PRN Q6HRS  PRN IV NAUSEA/VOMITING, 

2nd CHOICE Last administered on 5/7/20at 03:57;  Start 3/16/20 at 17:45


Atenolol (Tenormin) 100 mg DAILY PO ;  Start 3/17/20 at 09:00;  Stop 3/16/20 at 

20:08;  Status DC


Metoprolol Tartrate (Lopressor Vial) 2.5 mg Q6HRS IVP  Last administered on 

3/17/20at 05:51;  Start 3/16/20 at 20:15;  Stop 3/17/20 at 10:02;  Status DC


Metoprolol Tartrate (Lopressor Vial) 5 mg Q6HRS IVP  Last administered on 

3/26/20at 00:12;  Start 3/17/20 at 10:15;  Stop 3/28/20 at 08:48;  Status DC


Hydromorphone HCl (Dilaudid) 1 mg PRN Q3HRS  PRN IV SEVERE PAIN 7-10 Last 

administered on 3/23/20at 05:13;  Start 3/17/20 at 12:00;  Stop 3/31/20 at 

00:25;  Status DC


Lidocaine HCl (Buffered Lidocaine 1%) 3 ml STK-MED ONCE .ROUTE ;  Start 3/17/20 

at 12:55;  Stop 3/17/20 at 12:56;  Status DC


Albumin Human 500 ml @  125 mls/hr 1X  ONCE IV  Last administered on 3/17/20at 

14:33;  Start 3/17/20 at 14:30;  Stop 3/17/20 at 18:32;  Status DC


Norepinephrine Bitartrate 8 mg/ Dextrose 258 ml @  17.299 mls/ hr CONT  PRN IV 

PER PROTOCOL Last administered on 4/14/20at 12:48;  Start 3/17/20 at 15:30;  

Stop 4/17/20 at 09:19;  Status DC


Sodium Chloride 1,000 ml @  125 mls/hr Q8H IV  Last administered on 3/17/20at 

21:04;  Start 3/17/20 at 16:00;  Stop 3/18/20 at 02:42;  Status DC


Albumin Human 500 ml @  125 mls/hr PRN BID  PRN IV After every 2L NSS & BP < 

90mm Last administered on 4/1/20at 14:21;  Start 3/17/20 at 16:00


Iohexol (Omnipaque 300 Mg/ml) 60 ml 1X  ONCE IV  Last administered on 3/17/20at 

17:20;  Start 3/17/20 at 17:00;  Stop 3/17/20 at 17:01;  Status DC


Info (CONTRAST GIVEN -- Rx MONITORING) 1 each PRN DAILY  PRN MC SEE COMMENTS;  

Start 3/17/20 at 17:00;  Stop 3/19/20 at 16:59;  Status DC


Meropenem 1 gm/ Sodium Chloride 100 ml @  200 mls/hr Q8HRS IV  Last administered

on 3/18/20at 05:45;  Start 3/17/20 at 20:00;  Stop 3/18/20 at 08:48;  Status DC


Furosemide (Lasix) 40 mg 1X  ONCE IVP  Last administered on 3/17/20at 22:12;  

Start 3/17/20 at 22:30;  Stop 3/17/20 at 22:31;  Status DC


Calcium Chloride 1000 mg/Sodium Chloride 110 ml @  220 mls/hr 1X  ONCE IV  Last 

administered on 3/17/20at 22:11;  Start 3/17/20 at 22:30;  Stop 3/17/20 at 

22:59;  Status DC


Albuterol Sulfate (Ventolin Neb Soln) 2.5 mg 1X  ONCE NEB  Last administered on 

3/18/20at 00:56;  Start 3/17/20 at 22:30;  Stop 3/17/20 at 22:31;  Status DC


Insulin Human Regular (HumuLIN R VIAL) 5 unit 1X  ONCE IV  Last administered on 

3/17/20at 22:14;  Start 3/17/20 at 22:30;  Stop 3/17/20 at 22:31;  Status DC


Magnesium Sulfate 50 ml @ 25 mls/hr 1X  ONCE IV  Last administered on 3/18/20at 

02:57;  Start 3/18/20 at 03:00;  Stop 3/18/20 at 04:59;  Status DC


Calcium Gluconate 1000 mg/Sodium Chloride 110 ml @  220 mls/hr 1X  ONCE IV  Last

administered on 3/18/20at 02:46;  Start 3/18/20 at 03:00;  Stop 3/18/20 at 

03:29;  Status DC


Sodium Chloride 1,000 ml @  200 mls/hr Q5H IV  Last administered on 3/18/20at 

02:46;  Start 3/18/20 at 03:00;  Stop 3/18/20 at 10:21;  Status DC


Calcium Gluconate 1000 mg/Sodium Chloride 110 ml @  220 mls/hr 1X  ONCE IV  Last

administered on 3/18/20at 03:21;  Start 3/18/20 at 03:30;  Stop 3/18/20 at 

03:59;  Status DC


Sodium Bicarbonate 50 meq/Sodium Chloride 1,050 ml @  75 mls/hr Q14H IV  Last 

administered on 3/22/20at 21:10;  Start 3/18/20 at 07:30;  Stop 3/23/20 at 

10:28;  Status DC


Calcium Gluconate 2000 mg/Sodium Chloride 120 ml @  220 mls/hr 1X  ONCE IV  Last

administered on 3/18/20at 09:05;  Start 3/18/20 at 07:30;  Stop 3/18/20 at 

08:02;  Status DC


Lidocaine HCl (Xylocaine-Mpf 1% 2ml Vial) 2 ml STK-MED ONCE .ROUTE ;  Start 

3/18/20 at 08:47;  Stop 3/18/20 at 08:47;  Status DC


Meropenem 500 mg/ Sodium Chloride 50 ml @  100 mls/hr Q12HR IV  Last 

administered on 3/23/20at 21:01;  Start 3/18/20 at 18:00;  Stop 3/24/20 at 

07:58;  Status DC


Lidocaine HCl (Buffered Lidocaine 1%) 3 ml STK-MED ONCE .ROUTE ;  Start 3/18/20 

at 09:46;  Stop 3/18/20 at 09:46;  Status DC


Lidocaine HCl (Buffered Lidocaine 1%) 6 ml 1X  ONCE INJ  Last administered on 

3/18/20at 10:26;  Start 3/18/20 at 10:15;  Stop 3/18/20 at 10:16;  Status DC


Info (Tpn Per Pharmacy) 1 each PRN DAILY  PRN MC SEE COMMENTS Last administered 

on 5/9/20at 13:28;  Start 3/18/20 at 12:00


Sodium Chloride 1,000 ml @  1,000 mls/hr Q1H PRN IV hypotension;  Start 3/18/20 

at 12:07;  Stop 3/18/20 at 18:06;  Status DC


Diphenhydramine HCl (Benadryl) 25 mg 1X PRN  PRN IV ITCHING;  Start 3/18/20 at 

12:15;  Stop 3/19/20 at 12:14;  Status DC


Diphenhydramine HCl (Benadryl) 25 mg 1X PRN  PRN IV ITCHING;  Start 3/18/20 at 

12:15;  Stop 3/19/20 at 12:14;  Status DC


Sodium Chloride 1,000 ml @  400 mls/hr Q2H30M PRN IV PATENCY;  Start 3/18/20 at 

12:07;  Stop 3/19/20 at 00:06;  Status DC


Info (PHARMACY MONITORING -- do not chart) 1 each PRN DAILY  PRN MC SEE 

COMMENTS;  Start 3/18/20 at 12:15;  Stop 3/20/20 at 08:13;  Status DC


Sodium Chloride 90 meq/Calcium Gluconate 10 meq/ Multivitamins 10 ml/Chromium/ 

Copper/Manganese/ Seleni/Zn 1 ml/ Total Parenteral Nutrition/Amino 

Acids/Dextrose/ Fat Emulsion Intravenous 55.005 ml  @ 2.292 mls/hr TPN  CONT IV 

;  Start 3/18/20 at 22:00;  Stop 3/18/20 at 12:33;  Status DC


Info (Tpn Per Pharmacy) 1 each PRN DAILY  PRN MC SEE COMMENTS;  Start 3/18/20 at

12:30;  Status UNV


Sodium Chloride 90 meq/Calcium Gluconate 10 meq/ Multivitamins 10 ml/Chromium/ 

Copper/Manganese/ Seleni/Zn 0.5 ml/ Total Parenteral Nutrition/Amino 

Acids/Dextrose/ Fat Emulsion Intravenous 1,512 ml @  63 mls/hr TPN  CONT IV  

Last administered on 3/18/20at 22:06;  Start 3/18/20 at 22:00;  Stop 3/19/20 at 

21:59;  Status DC


Calcium Carbonate/ Glycine (Tums) 500 mg PRN AFTMEALHC  PRN PO INDIGESTION;  

Start 3/18/20 at 17:45


Calcium Gluconate (Calcium Gluconate) 2,000 mg 1X  ONCE IVP  Last administered 

on 3/19/20at 02:19;  Start 3/19/20 at 02:15;  Stop 3/19/20 at 02:16;  Status DC


Calcium Chloride 3000 mg/Sodium Chloride 1,030 ml @  50 mls/hr [id number] IV  Last 

administered on 3/21/20at 02:17;  Start 3/19/20 at 08:00;  Stop 3/21/20 at 

15:23;  Status DC


Lorazepam (Ativan Inj) 1 mg PRN Q4HRS  PRN IVP ANXIETY / AGITATION, 2nd choic 

Last administered on 4/17/20at 03:51;  Start 3/19/20 at 09:00;  Stop 4/17/20 at 

09:19;  Status DC


Sodium Chloride 1,000 ml @  1,000 mls/hr Q1H PRN IV hypotension;  Start 3/19/20 

at 08:56;  Stop 3/19/20 at 14:55;  Status DC


Albumin Human 200 ml @  200 mls/hr 1X PRN  PRN IV Hypotension;  Start 3/19/20 at

09:00;  Stop 3/19/20 at 14:59;  Status DC


Diphenhydramine HCl (Benadryl) 25 mg 1X PRN  PRN IV ITCHING;  Start 3/19/20 at 

09:00;  Stop 3/20/20 at 08:59;  Status DC


Diphenhydramine HCl (Benadryl) 25 mg 1X PRN  PRN IV ITCHING;  Start 3/19/20 at 

09:00;  Stop 3/20/20 at 08:59;  Status DC


Sodium Chloride 1,000 ml @  400 mls/hr Q2H30M PRN IV PATENCY;  Start 3/19/20 at 

08:56;  Stop 3/19/20 at 20:55;  Status DC


Info (PHARMACY MONITORING -- do not chart) 1 each PRN DAILY  PRN MC SEE 

COMMENTS;  Start 3/19/20 at 09:00;  Status UNV


Info (PHARMACY MONITORING -- do not chart) 1 each PRN DAILY  PRN MC SEE 

COMMENTS;  Start 3/19/20 at 09:00;  Stop 3/20/20 at 08:13;  Status DC


Digoxin (Lanoxin) 500 mcg 1X  ONCE IV  Last administered on 3/19/20at 10:04;  

Start 3/19/20 at 10:00;  Stop 3/19/20 at 10:01;  Status DC


Digoxin (Lanoxin) 125 mcg 1X  ONCE IV  Last administered on 3/19/20at 17:10;  

Start 3/19/20 at 18:00;  Stop 3/19/20 at 18:01;  Status DC


Magnesium Sulfate 100 ml @  25 mls/hr 1X  ONCE IV  Last administered on 

3/19/20at 12:48;  Start 3/19/20 at 13:00;  Stop 3/19/20 at 16:59;  Status DC


Sodium Chloride 90 meq/Magnesium Sulfate 10 meq/ Calcium Gluconate 20 meq/ 

Multivitamins 10 ml/Chromium/ Copper/Manganese/ Seleni/Zn 0.5 ml/ Total 

Parenteral Nutrition/Amino Acids/Dextrose/ Fat Emulsion Intravenous 1,512 ml @  

63 mls/hr TPN  CONT IV  Last administered on 3/19/20at 22:25;  Start 3/19/20 at 

22:00;  Stop 3/20/20 at 21:59;  Status DC


Sodium Chloride 1,000 ml @  1,000 mls/hr Q1H PRN IV hypotension;  Start 3/20/20 

at 08:05;  Stop 3/20/20 at 14:04;  Status DC


Albumin Human 200 ml @  200 mls/hr 1X  ONCE IV  Last administered on 3/20/20at 

08:57;  Start 3/20/20 at 08:15;  Stop 3/20/20 at 09:14;  Status DC


Diphenhydramine HCl (Benadryl) 25 mg 1X PRN  PRN IV ITCHING;  Start 3/20/20 at 

08:15;  Stop 3/21/20 at 08:14;  Status DC


Diphenhydramine HCl (Benadryl) 25 mg 1X PRN  PRN IV ITCHING;  Start 3/20/20 at 

08:15;  Stop 3/21/20 at 08:14;  Status DC


Sodium Chloride 1,000 ml @  400 mls/hr Q2H30M PRN IV PATENCY;  Start 3/20/20 at 

08:05;  Stop 3/20/20 at 20:04;  Status DC


Info (PHARMACY MONITORING -- do not chart) 1 each PRN DAILY  PRN MC SEE 

COMMENTS;  Start 3/20/20 at 08:15;  Stop 3/24/20 at 07:57;  Status DC


Sodium Chloride 90 meq/Potassium Chloride 15 meq/ Potassium Phosphate 10 mmol/ 

Magnesium Sulfate 10 meq/Calcium Gluconate 20 meq/ Multivitamins 10 ml/Chromium/

Copper/Manganese/ Seleni/Zn 0.5 ml/ Total Parenteral Nutrition/Amino 

Acids/Dextrose/ Fat Emulsion Intravenous 1,512 ml @  63 mls/hr TPN  CONT IV  

Last administered on 3/20/20at 21:01;  Start 3/20/20 at 22:00;  Stop 3/21/20 at 

21:59;  Status DC


Potassium Chloride/Water 100 ml @  100 mls/hr 1X  ONCE IV  Last administered on 

3/20/20at 14:09;  Start 3/20/20 at 14:00;  Stop 3/20/20 at 14:59;  Status DC


Benzocaine (Hurricaine One) 1 spray 1X  ONCE MM  Last administered on 3/20/20at 

16:38;  Start 3/20/20 at 14:30;  Stop 3/20/20 at 14:31;  Status DC


Lidocaine HCl (Glydo (Lidocaine) Jelly) 1 thomas 1X  ONCE MM  Last administered on 

3/20/20at 16:38;  Start 3/20/20 at 14:30;  Stop 3/20/20 at 14:31;  Status DC


Linezolid/Dextrose 300 ml @  300 mls/hr Q12HR IV  Last administered on 3/26/20at

21:04;  Start 3/20/20 at 20:00;  Stop 3/27/20 at 07:50;  Status DC


Acetaminophen (Tylenol) 650 mg PRN Q6HRS  PRN PO MILD PAIN / TEMP;  Start 

3/21/20 at 03:30;  Stop 3/21/20 at 03:36;  Status DC


Acetaminophen (Tylenol) 650 mg PRN Q6HRS  PRN PEG MILD PAIN / TEMP Last 

administered on 4/16/20at 19:56;  Start 3/21/20 at 03:36


Sodium Chloride 1,000 ml @  1,000 mls/hr Q1H PRN IV hypotension;  Start 3/21/20 

at 07:50;  Stop 3/21/20 at 13:49;  Status DC


Albumin Human 200 ml @  200 mls/hr 1X PRN  PRN IV Hypotension;  Start 3/21/20 at

08:00;  Stop 3/21/20 at 13:59;  Status DC


Sodium Chloride (Normal Saline Flush) 10 ml 1X PRN  PRN IV AP catheter pack;  

Start 3/21/20 at 08:00;  Stop 3/22/20 at 07:59;  Status DC


Sodium Chloride (Normal Saline Flush) 10 ml 1X PRN  PRN IV  catheter pack;  

Start 3/21/20 at 08:00;  Stop 3/22/20 at 07:59;  Status DC


Sodium Chloride 1,000 ml @  400 mls/hr Q2H30M PRN IV PATENCY;  Start 3/21/20 at 

07:50;  Stop 3/21/20 at 19:49;  Status DC


Info (PHARMACY MONITORING -- do not chart) 1 each PRN DAILY  PRN MC SEE 

COMMENTS;  Start 3/21/20 at 08:00;  Status UNV


Info (PHARMACY MONITORING -- do not chart) 1 each PRN DAILY  PRN MC SEE 

COMMENTS;  Start 3/21/20 at 08:00;  Stop 3/23/20 at 08:25;  Status DC


Sodium Chloride 90 meq/Potassium Chloride 15 meq/ Potassium Phosphate 10 mmol/ 

Magnesium Sulfate 10 meq/Calcium Gluconate 20 meq/ Multivitamins 10 ml/Chromium/

Copper/Manganese/ Seleni/Zn 0.5 ml/ Total Parenteral Nutrition/Amino 

Acids/Dextrose/ Fat Emulsion Intravenous 1,512 ml @  63 mls/hr TPN  CONT IV  

Last administered on 3/21/20at 20:57;  Start 3/21/20 at 22:00;  Stop 3/22/20 at 

21:59;  Status DC


Sodium Chloride 90 meq/Potassium Chloride 15 meq/ Potassium Phosphate 15 mmol/ 

Magnesium Sulfate 10 meq/Calcium Gluconate 20 meq/ Multivitamins 10 ml/Chromium/

Copper/Manganese/ Seleni/Zn 0.5 ml/ Total Parenteral Nutrition/Amino 

Acids/Dextrose/ Fat Emulsion Intravenous 1,512 ml @  63 mls/hr TPN  CONT IV ;  

Start 3/22/20 at 22:00;  Stop 3/22/20 at 14:16;  Status DC


Sodium Chloride 90 meq/Potassium Chloride 15 meq/ Potassium Phosphate 15 mmol/ 

Magnesium Sulfate 10 meq/Calcium Gluconate 20 meq/ Multivitamins 10 ml/Chromium/

Copper/Manganese/ Seleni/Zn 0.5 ml/ Total Parenteral Nutrition/Amino 

Acids/Dextrose/ Fat Emulsion Intravenous 1,200 ml @  50 mls/hr TPN  CONT IV ;  

Start 3/22/20 at 22:00;  Stop 3/22/20 at 14:17;  Status DC


Sodium Chloride 90 meq/Potassium Chloride 15 meq/ Potassium Phosphate 10 mmol/ 

Magnesium Sulfate 10 meq/Calcium Gluconate 20 meq/ Multivitamins 10 ml/Chromium/

Copper/Manganese/ Seleni/Zn 0.5 ml/ Total Parenteral Nutrition/Amino 

Acids/Dextrose/ Fat Emulsion Intravenous 1,200 ml @  50 mls/hr TPN  CONT IV  

Last administered on 3/22/20at 23:29;  Start 3/22/20 at 22:00;  Stop 3/23/20 at 

21:59;  Status DC


Sodium Chloride 1,000 ml @  1,000 mls/hr Q1H PRN IV hypotension;  Start 3/23/20 

at 07:28;  Stop 3/23/20 at 13:27;  Status DC


Albumin Human 200 ml @  200 mls/hr 1X  ONCE IV  Last administered on 3/23/20at 

08:51;  Start 3/23/20 at 07:30;  Stop 3/23/20 at 08:29;  Status DC


Diphenhydramine HCl (Benadryl) 25 mg 1X PRN  PRN IV ITCHING;  Start 3/23/20 at 

07:30;  Stop 3/24/20 at 07:29;  Status DC


Diphenhydramine HCl (Benadryl) 25 mg 1X PRN  PRN IV ITCHING;  Start 3/23/20 at 

07:30;  Stop 3/24/20 at 07:29;  Status DC


Sodium Chloride 1,000 ml @  400 mls/hr Q2H30M PRN IV PATENCY;  Start 3/23/20 at 

07:28;  Stop 3/23/20 at 19:27;  Status DC


Info (PHARMACY MONITORING -- do not chart) 1 each PRN DAILY  PRN MC SEE 

COMMENTS;  Start 3/23/20 at 07:30;  Stop 4/3/20 at 13:01;  Status DC


Metronidazole 100 ml @  100 mls/hr Q6HRS IV  Last administered on 4/8/20at 

06:26;  Start 3/23/20 at 08:30;  Stop 4/8/20 at 09:58;  Status DC


Micafungin Sodium 100 mg/Dextrose 100 ml @  100 mls/hr Q24H IV  Last 

administered on 4/30/20at 08:18;  Start 3/23/20 at 09:00;  Stop 4/30/20 at 

20:58;  Status DC


Propofol 0 ml @ As Directed STK-MED ONCE IV ;  Start 3/23/20 at 07:53;  Stop 3/2

3/20 at 07:53;  Status DC


Etomidate (Amidate) 20 mg STK-MED ONCE IV ;  Start 3/23/20 at 07:53;  Stop 

3/23/20 at 07:54;  Status DC


Midazolam HCl (Versed) 5 mg STK-MED ONCE .ROUTE ;  Start 3/23/20 at 07:57;  Stop

3/23/20 at 07:57;  Status DC


Fentanyl Citrate 30 ml @ 0 mls/hr CONT  PRN IV SEE PROTOCOL Last administered on

4/17/20at 06:12;  Start 3/23/20 at 08:15;  Stop 4/17/20 at 09:19;  Status DC


Artificial Tears (Artificial Tears) 1 drop PRN Q1HR  PRN OU DRY EYE, 1st choice;

 Start 3/23/20 at 08:15;  Stop 4/29/20 at 05:31;  Status DC


Midazolam HCl 50 mg/Sodium Chloride 50 ml @ 0 mls/hr CONT  PRN IV SEE PROTOCOL 

Last administered on 3/26/20at 22:39;  Start 3/23/20 at 08:15;  Stop 3/28/20 at 

15:59;  Status DC


Etomidate (Amidate) 8 mg 1X  ONCE IV  Last administered on 3/23/20at 08:33;  

Start 3/23/20 at 08:30;  Stop 3/23/20 at 08:31;  Status DC


Succinylcholine Chloride (Anectine) 120 mg 1X  ONCE IV  Last administered on 

3/23/20at 08:34;  Start 3/23/20 at 08:30;  Stop 3/23/20 at 08:31;  Status DC


Midazolam HCl (Versed) 5 mg 1X  ONCE IV ;  Start 3/23/20 at 08:30;  Stop 3/23/20

at 08:31;  Status DC


Potassium Chloride 15 meq/ Bicarbonate Dialysis Soln w/ out KCl 5,007.5 ml  @ 

1,000 mls/ hr Q5H1M IV  Last administered on 3/24/20at 11:11;  Start 3/23/20 at 

12:00;  Stop 3/24/20 at 11:15;  Status DC


Potassium Chloride 15 meq/ Bicarbonate Dialysis Soln w/ out KCl 5,007.5 ml  @ 

1,000 mls/ hr Q5H1M IV  Last administered on 3/24/20at 11:12;  Start 3/23/20 at 

12:00;  Stop 3/24/20 at 11:17;  Status DC


Potassium Chloride 15 meq/ Bicarbonate Dialysis Soln w/ out KCl 5,007.5 ml  @ 

1,000 mls/ hr Q5H1M IV  Last administered on 3/24/20at 11:11;  Start 3/23/20 at 

12:00;  Stop 3/24/20 at 11:19;  Status DC


Sodium Chloride 90 meq/Potassium Chloride 15 meq/ Potassium Phosphate 10 mmol/ 

Magnesium Sulfate 10 meq/Calcium Gluconate 20 meq/ Multivitamins 10 ml/Chromium/

Copper/Manganese/ Seleni/Zn 0.5 ml/ Total Parenteral Nutrition/Amino 

Acids/Dextrose/ Fat Emulsion Intravenous 1,400 ml @  58.333 mls/ hr TPN  CONT IV

 Last administered on 3/23/20at 21:42;  Start 3/23/20 at 22:00;  Stop 3/24/20 at

21:59;  Status DC


Heparin Sodium (Porcine) (Heparin Sodium) 5,000 unit Q8HRS SQ  Last administered

on 3/28/20at 05:55;  Start 3/23/20 at 15:00;  Stop 3/28/20 at 13:28;  Status DC


Meropenem 500 mg/ Sodium Chloride 50 ml @  100 mls/hr Q6HRS IV  Last 

administered on 3/25/20at 06:00;  Start 3/24/20 at 09:00;  Stop 3/25/20 at 

07:29;  Status DC


Potassium Phosphate 20 mmol/ Sodium Chloride 106.6667 ml @  51.667 m... 1X  ONCE

IV  Last administered on 3/24/20at 11:22;  Start 3/24/20 at 10:15;  Stop 3/24/20

at 12:18;  Status DC


Acetaminophen (Tylenol Supp) 650 mg PRN Q6HRS  PRN AL MILD PAIN / TEMP > 100.3'F

Last administered on 5/5/20at 09:12;  Start 3/24/20 at 10:30


Potassium Chloride/Water 100 ml @  100 mls/hr Q1H IV  Last administered on 

3/24/20at 12:12;  Start 3/24/20 at 11:00;  Stop 3/24/20 at 12:59;  Status DC


Potassium Chloride 20 meq/ Bicarbonate Dialysis Soln w/ out KCl 5,010 ml @  

1,000 mls/hr Q5H1M IV  Last administered on 3/25/20at 08:48;  Start 3/24/20 at 

12:00;  Stop 3/25/20 at 13:03;  Status DC


Potassium Chloride 20 meq/ Bicarbonate Dialysis Soln w/ out KCl 5,010 ml @  

1,000 mls/hr Q5H1M IV  Last administered on 3/29/20at 14:52;  Start 3/24/20 at 

11:30;  Stop 3/29/20 at 19:59;  Status DC


Potassium Chloride 20 meq/ Bicarbonate Dialysis Soln w/ out KCl 5,010 ml @  

1,000 mls/hr Q5H1M IV  Last administered on 3/29/20at 14:53;  Start 3/24/20 at 

11:30;  Stop 3/29/20 at 19:59;  Status DC


Sodium Chloride 90 meq/Potassium Chloride 15 meq/ Potassium Phosphate 15 mmol/ 

Magnesium Sulfate 10 meq/Calcium Gluconate 15 meq/ Multivitamins 10 ml/Chromium/

Copper/Manganese/ Seleni/Zn 0.5 ml/ Total Parenteral Nutrition/Amino 

Acids/Dextrose/ Fat Emulsion Intravenous 1,400 ml @  58.333 mls/ hr TPN  CONT IV

 Last administered on 3/24/20at 22:17;  Start 3/24/20 at 22:00;  Stop 3/25/20 at

21:59;  Status DC


Cefepime HCl (Maxipime) 2 gm Q12HR IVP  Last administered on 4/7/20at 20:56;  

Start 3/25/20 at 09:00;  Stop 4/8/20 at 09:58;  Status DC


Daptomycin 500 mg/ Sodium Chloride 50 ml @  100 mls/hr Q48H IV  Last 

administered on 4/10/20at 09:57;  Start 3/25/20 at 08:30;  Stop 4/10/20 at 

10:07;  Status DC


Lidocaine HCl (Buffered Lidocaine 1%) 3 ml 1X  ONCE INJ  Last administered on 

3/25/20at 10:27;  Start 3/25/20 at 10:30;  Stop 3/25/20 at 10:31;  Status DC


Potassium Phosphate 20 mmol/ Sodium Chloride 106.6667 ml @  51.667 m... 1X  ONCE

IV  Last administered on 3/25/20at 12:51;  Start 3/25/20 at 13:00;  Stop 3/25/20

at 15:03;  Status DC


Sodium Chloride 90 meq/Potassium Chloride 15 meq/ Potassium Phosphate 18 mmol/ 

Magnesium Sulfate 8 meq/Calcium Gluconate 15 meq/ Multivitamins 10 ml/Chromium/ 

Copper/Manganese/ Seleni/Zn 0.5 ml/ Total Parenteral Nutrition/Amino 

Acids/Dextrose/ Fat Emulsion Intravenous 1,400 ml @  58.333 mls/ hr TPN  CONT IV

 Last administered on 3/25/20at 22:16;  Start 3/25/20 at 22:00;  Stop 3/26/20 at

21:59;  Status DC


Potassium Chloride 20 meq/ Bicarbonate Dialysis Soln w/ out KCl 5,010 ml @  

1,000 mls/hr Q5H1M IV  Last administered on 3/29/20at 14:54;  Start 3/25/20 at 

16:00;  Stop 3/29/20 at 19:59;  Status DC


Multi-Ingred Cream/Lotion/Oil/ Oint (Artificial Tears Eye Ointment) 1 thomas PRN 

Q1HR  PRN OU DRY EYE, 2nd choice Last administered on 4/13/20at 08:19;  Start 

3/25/20 at 17:30


Sodium Chloride 90 meq/Potassium Chloride 15 meq/ Potassium Phosphate 18 mmol/ 

Magnesium Sulfate 8 meq/Calcium Gluconate 15 meq/ Multivitamins 10 ml/Chromium/ 

Copper/Manganese/ Seleni/Zn 0.5 ml/ Total Parenteral Nutrition/Amino 

Acids/Dextrose/ Fat Emulsion Intravenous 1,400 ml @  58.333 mls/ hr TPN  CONT IV

 Last administered on 3/26/20at 22:00;  Start 3/26/20 at 22:00;  Stop 3/27/20 at

21:59;  Status DC


Albumin Human 500 ml @  125 mls/hr 1X  ONCE IV ;  Start 3/26/20 at 14:15;  Stop 

3/26/20 at 18:14;  Status DC


Sodium Chloride 90 meq/Potassium Chloride 15 meq/ Potassium Phosphate 18 mmol/ 

Magnesium Sulfate 8 meq/Calcium Gluconate 15 meq/ Multivitamins 10 ml/Chromium/ 

Copper/Manganese/ Seleni/Zn 0.5 ml/ Insulin Human Regular 10 unit/ Total 

Parenteral Nutrition/Amino Acids/Dextrose/ Fat Emulsion Intravenous 1,400 ml @  

58.333 mls/ hr TPN  CONT IV  Last administered on 3/27/20at 21:43;  Start 

3/27/20 at 22:00;  Stop 3/28/20 at 21:59;  Status DC


Lidocaine HCl (Buffered Lidocaine 1%) 3 ml STK-MED ONCE .ROUTE ;  Start 3/25/20 

at 10:00;  Stop 3/27/20 at 13:57;  Status DC


Midazolam HCl 100 mg/Sodium Chloride 100 ml @ 7 mls/hr CONT  PRN IV SEE PROTOCOL

Last administered on 4/8/20at 15:35;  Start 3/28/20 at 16:00


Sodium Chloride 90 meq/Potassium Chloride 15 meq/ Potassium Phosphate 18 mmol/ 

Magnesium Sulfate 8 meq/Calcium Gluconate 15 meq/ Multivitamins 10 ml/Chromium/ 

Copper/Manganese/ Seleni/Zn 0.5 ml/ Insulin Human Regular 15 unit/ Total 

Parenteral Nutrition/Amino Acids/Dextrose/ Fat Emulsion Intravenous 1,400 ml @  

58.333 mls/ hr TPN  CONT IV  Last administered on 3/28/20at 20:34;  Start 

3/28/20 at 22:00;  Stop 3/29/20 at 21:59;  Status DC


Info ([facility]cu Electrolyte Protocol) 1 ea CONT PRN  PRN MC PER PROTOCOL;  Start 

3/29/20 at 13:15


Sodium Chloride 90 meq/Potassium Chloride 15 meq/ Potassium Phosphate 18 mmol/ 

Magnesium Sulfate 8 meq/Calcium Gluconate 15 meq/ Multivitamins 10 ml/Chromium/ 

Copper/Manganese/ Seleni/Zn 0.5 ml/ Insulin Human Regular 15 unit/ Total 

Parenteral Nutrition/Amino Acids/Dextrose/ Fat Emulsion Intravenous 1,400 ml @  

58.333 mls/ hr TPN  CONT IV  Last administered on 3/29/20at 22:05;  Start 

3/29/20 at 22:00;  Stop 3/30/20 at 21:59;  Status DC


Potassium Chloride 15 meq/ Bicarbonate Dialysis Soln w/ out KCl 5,007.5 ml  @ 

1,000 mls/ hr Q5H1M IV  Last administered on 4/1/20at 18:14;  Start 3/29/20 at 

20:00;  Stop 4/2/20 at 13:08;  Status DC


Potassium Chloride 15 meq/ Bicarbonate Dialysis Soln w/ out KCl 5,007.5 ml  @ 

1,000 mls/ hr Q5H1M IV  Last administered on 4/1/20at 18:14;  Start 3/29/20 at 

20:00;  Stop 4/2/20 at 13:08;  Status DC


Potassium Chloride 15 meq/ Bicarbonate Dialysis Soln w/ out KCl 5,007.5 ml  @ 

1,000 mls/ hr Q5H1M IV  Last administered on 4/1/20at 18:14;  Start 3/29/20 at 

20:00;  Stop 4/2/20 at 13:08;  Status DC


Iohexol (Omnipaque 240 Mg/ml) 30 ml 1X  ONCE PO  Last administered on 3/30/20at 

11:30;  Start 3/30/20 at 11:30;  Stop 3/30/20 at 11:33;  Status DC


Info (CONTRAST GIVEN -- Rx MONITORING) 1 each PRN DAILY  PRN MC SEE COMMENTS;  

Start 3/30/20 at 11:45;  Stop 4/1/20 at 11:44;  Status DC


Sodium Chloride 90 meq/Potassium Chloride 15 meq/ Potassium Phosphate 18 mmol/ 

Magnesium Sulfate 8 meq/Calcium Gluconate 15 meq/ Multivitamins 10 ml/Chromium/ 

Copper/Manganese/ Seleni/Zn 0.5 ml/ Insulin Human Regular 15 unit/ Total 

Parenteral Nutrition/Amino Acids/Dextrose/ Fat Emulsion Intravenous 1,400 ml @  

58.333 mls/ hr TPN  CONT IV  Last administered on 3/30/20at 21:47;  Start 

3/30/20 at 22:00;  Stop 3/31/20 at 21:59;  Status DC


Sodium Chloride 90 meq/Potassium Chloride 15 meq/ Potassium Phosphate 18 mmol/ 

Magnesium Sulfate 8 meq/Calcium Gluconate 15 meq/ Multivitamins 10 ml/Chromium/ 

Copper/Manganese/ Seleni/Zn 0.5 ml/ Insulin Human Regular 20 unit/ Total 

Parenteral Nutrition/Amino Acids/Dextrose/ Fat Emulsion Intravenous 1,400 ml @  

58.333 mls/ hr TPN  CONT IV  Last administered on 3/31/20at 21:36;  Start 

3/31/20 at 22:00;  Stop 4/1/20 at 21:59;  Status DC


Alteplase, Recombinant (Cathflo For Central Catheter Clearance) 1 mg 1X  ONCE 

INT CAT  Last administered on 3/31/20at 20:03;  Start 3/31/20 at 19:30;  Stop 

3/31/20 at 19:46;  Status DC


Alteplase, Recombinant (Cathflo For Central Catheter Clearance) 1 mg 1X  ONCE 

INT CAT  Last administered on 3/31/20at 22:05;  Start 3/31/20 at 22:00;  Stop 

3/31/20 at 22:01;  Status DC


Sodium Chloride 90 meq/Potassium Chloride 15 meq/ Potassium Phosphate 18 mmol/ 

Magnesium Sulfate 8 meq/Calcium Gluconate 15 meq/ Multivitamins 10 ml/Chromium/ 

Copper/Manganese/ Seleni/Zn 0.5 ml/ Insulin Human Regular 20 unit/ Total 

Parenteral Nutrition/Amino Acids/Dextrose/ Fat Emulsion Intravenous 1,400 ml @  

58.333 mls/ hr TPN  CONT IV  Last administered on 4/1/20at 21:30;  Start 4/1/20 

at 22:00;  Stop 4/2/20 at 21:59;  Status DC


Dexmedetomidine HCl 400 mcg/ Sodium Chloride 100 ml @ 0 mls/hr CONT  PRN IV 

ANXIETY / AGITATION Last administered on 5/9/20at 05:45;  Start 4/2/20 at 08:15


Sodium Chloride 500 ml @  500 mls/hr 1X PRN  PRN IV ELEVATED BP, SEE COMMENTS;  

Start 4/2/20 at 08:15


Atropine Sulfate (ATROPINE 0.5mg SYRINGE) 0.5 mg PRN Q5MIN  PRN IV SEE COMMENTS;

 Start 4/2/20 at 08:15


Furosemide (Lasix) 20 mg 1X  ONCE IVP  Last administered on 4/2/20at 08:19;  

Start 4/2/20 at 08:15;  Stop 4/2/20 at 08:16;  Status DC


Lidocaine HCl (Buffered Lidocaine 1%) 3 ml STK-MED ONCE .ROUTE ;  Start 4/2/20 

at 08:39;  Stop 4/2/20 at 08:39;  Status DC


Lidocaine HCl (Buffered Lidocaine 1%) 6 ml 1X  ONCE INJ  Last administered on 

4/2/20at 09:05;  Start 4/2/20 at 09:00;  Stop 4/2/20 at 09:06;  Status DC


Sodium Chloride 90 meq/Potassium Chloride 15 meq/ Potassium Phosphate 18 mmol/ 

Magnesium Sulfate 8 meq/Calcium Gluconate 15 meq/ Multivitamins 10 ml/Chromium/ 

Copper/Manganese/ Seleni/Zn 0.5 ml/ Insulin Human Regular 20 unit/ Total 

Parenteral Nutrition/Amino Acids/Dextrose/ Fat Emulsion Intravenous 1,400 ml @  

58.333 mls/ hr TPN  CONT IV  Last administered on 4/2/20at 22:45;  Start 4/2/20 

at 22:00;  Stop 4/3/20 at 21:59;  Status DC


Sodium Chloride 1,000 ml @  1,000 mls/hr Q1H PRN IV hypotension;  Start 4/3/20 

at 07:30;  Stop 4/3/20 at 13:29;  Status DC


Albumin Human 200 ml @  200 mls/hr 1X PRN  PRN IV Hypotension Last administered 

on 4/3/20at 09:36;  Start 4/3/20 at 07:30;  Stop 4/3/20 at 13:29;  Status DC


Sodium Chloride (Normal Saline Flush) 10 ml 1X PRN  PRN IV AP catheter pack;  

Start 4/3/20 at 07:30;  Stop 4/3/20 at 21:29;  Status DC


Sodium Chloride (Normal Saline Flush) 10 ml 1X PRN  PRN IV  catheter pack;  

Start 4/3/20 at 07:30;  Stop 4/4/20 at 07:29;  Status DC


Sodium Chloride 1,000 ml @  400 mls/hr Q2H30M PRN IV PATENCY;  Start 4/3/20 at 

07:30;  Stop 4/3/20 at 19:29;  Status DC


Info (PHARMACY MONITORING -- do not chart) 1 each PRN DAILY  PRN MC SEE 

COMMENTS;  Start 4/3/20 at 07:30;  Stop 4/3/20 at 13:02;  Status DC


Info (PHARMACY MONITORING -- do not chart) 1 each PRN DAILY  PRN MC SEE 

COMMENTS;  Start 4/3/20 at 07:30;  Stop 4/5/20 at 12:45;  Status DC


Sodium Chloride 90 meq/Potassium Chloride 15 meq/ Potassium Phosphate 10 mmol/ 

Magnesium Sulfate 8 meq/Calcium Gluconate 15 meq/ Multivitamins 10 ml/Chromium/ 

Copper/Manganese/ Seleni/Zn 0.5 ml/ Insulin Human Regular 25 unit/ Total 

Parenteral Nutrition/Amino Acids/Dextrose/ Fat Emulsion Intravenous 1,400 ml @  

58.333 mls/ hr TPN  CONT IV  Last administered on 4/3/20at 22:19;  Start 4/3/20 

at 22:00;  Stop 4/4/20 at 21:59;  Status DC


Heparin Sodium (Porcine) (Heparin Sodium) 5,000 unit Q12HR SQ  Last administered

on 4/26/20at 08:59;  Start 4/3/20 at 21:00;  Stop 4/26/20 at 10:05;  Status DC


Ondansetron HCl (Zofran) 4 mg PRN Q6HRS  PRN IV NAUSEA/VOMITING;  Start 4/6/20 

at 07:00;  Stop 4/7/20 at 06:59;  Status DC


Fentanyl Citrate (Fentanyl 2ml Vial) 25 mcg PRN Q5MIN  PRN IV MILD PAIN 1-3;  

Start 4/6/20 at 07:00;  Stop 4/7/20 at 06:59;  Status DC


Fentanyl Citrate (Fentanyl 2ml Vial) 50 mcg PRN Q5MIN  PRN IV MODERATE TO SEVERE

PAIN;  Start 4/6/20 at 07:00;  Stop 4/7/20 at 06:59;  Status DC


Ringer's Solution 1,000 ml @  30 mls/hr Q24H IV ;  Start 4/6/20 at 07:00;  Stop 

4/6/20 at 18:59;  Status DC


Lidocaine HCl (Xylocaine-Mpf 1% 2ml Vial) 2 ml PRN 1X  PRN ID PRIOR TO IV START;

 Start 4/6/20 at 07:00;  Stop 4/7/20 at 06:59;  Status DC


Prochlorperazine Edisylate (Compazine) 5 mg PACU PRN  PRN IV NAUSEA, MRX1;  Star

t 4/6/20 at 07:00;  Stop 4/7/20 at 06:59;  Status DC


Sodium Chloride 1,000 ml @  1,000 mls/hr Q1H PRN IV hypotension;  Start 4/4/20 

at 09:10;  Stop 4/4/20 at 15:09;  Status DC


Albumin Human 200 ml @  200 mls/hr 1X PRN  PRN IV Hypotension Last administered 

on 4/4/20at 10:10;  Start 4/4/20 at 09:15;  Stop 4/4/20 at 15:14;  Status DC


Sodium Chloride 1,000 ml @  400 mls/hr Q2H30M PRN IV PATENCY;  Start 4/4/20 at 

09:10;  Stop 4/4/20 at 21:09;  Status DC


Info (PHARMACY MONITORING -- do not chart) 1 each PRN DAILY  PRN MC SEE 

COMMENTS;  Start 4/4/20 at 09:15;  Stop 4/5/20 at 12:45;  Status DC


Info (PHARMACY MONITORING -- do not chart) 1 each PRN DAILY  PRN MC SEE 

COMMENTS;  Start 4/4/20 at 09:15;  Stop 4/5/20 at 12:45;  Status DC


Sodium Chloride 90 meq/Potassium Chloride 15 meq/ Potassium Phosphate 10 mmol/ 

Magnesium Sulfate 8 meq/Calcium Gluconate 15 meq/ Multivitamins 10 ml/Chromium/ 

Copper/Manganese/ Seleni/Zn 0.5 ml/ Insulin Human Regular 25 unit/ Total 

Parenteral Nutrition/Amino Acids/Dextrose/ Fat Emulsion Intravenous 1,400 ml @  

58.333 mls/ hr TPN  CONT IV  Last administered on 4/4/20at 22:10;  Start 4/4/20 

at 22:00;  Stop 4/5/20 at 21:59;  Status DC


Magnesium Sulfate 50 ml @ 25 mls/hr PRN DAILY  PRN IV for Mag < 1.7 on am labs 

Last administered on 4/20/20at 17:27;  Start 4/5/20 at 09:15


Sodium Chloride 90 meq/Potassium Chloride 15 meq/ Potassium Phosphate 10 mmol/ 

Magnesium Sulfate 8 meq/Calcium Gluconate 15 meq/ Multivitamins 10 ml/Chromium/ 

Copper/Manganese/ Seleni/Zn 0.5 ml/ Insulin Human Regular 25 unit/ Total 

Parenteral Nutrition/Amino Acids/Dextrose/ Fat Emulsion Intravenous 1,400 ml @  

58.333 mls/ hr TPN  CONT IV  Last administered on 4/5/20at 21:20;  Start 4/5/20 

at 22:00;  Stop 4/6/20 at 21:59;  Status DC


Sodium Chloride 1,000 ml @  1,000 mls/hr Q1H PRN IV hypotension;  Start 4/5/20 

at 12:23;  Stop 4/5/20 at 18:22;  Status DC


Albumin Human 200 ml @  200 mls/hr 1X  ONCE IV  Last administered on 4/5/20at 

13:34;  Start 4/5/20 at 12:30;  Stop 4/5/20 at 13:29;  Status DC


Diphenhydramine HCl (Benadryl) 25 mg 1X PRN  PRN IV ITCHING;  Start 4/5/20 at 

12:30;  Stop 4/6/20 at 12:29;  Status DC


Diphenhydramine HCl (Benadryl) 25 mg 1X PRN  PRN IV ITCHING;  Start 4/5/20 at 

12:30;  Stop 4/6/20 at 12:29;  Status DC


Info (PHARMACY MONITORING -- do not chart) 1 each PRN DAILY  PRN MC SEE 

COMMENTS;  Start 4/5/20 at 12:30;  Status Cancel


Bupivacaine HCl/ Epinephrine Bitart (Sensorcain-Epi 0.5%-1:[id number] Mpf) 30 ml 

STK-MED ONCE .ROUTE  Last administered on 4/6/20at 11:44;  Start 4/6/20 at 

11:00;  Stop 4/6/20 at 11:01;  Status DC


Cellulose (Surgicel Fibrillar 1x2) 1 each STK-MED ONCE .ROUTE ;  Start 4/6/20 at

11:00;  Stop 4/6/20 at 11:01;  Status DC


Sodium Chloride 90 meq/Potassium Chloride 15 meq/ Potassium Phosphate 10 mmol/ 

Magnesium Sulfate 12 meq/Calcium Gluconate 15 meq/ Multivitamins 10 ml/Chromium/

Copper/Manganese/ Seleni/Zn 0.5 ml/ Insulin Human Regular 25 unit/ Total 

Parenteral Nutrition/Amino Acids/Dextrose/ Fat Emulsion Intravenous 1,400 ml @  

58.333 mls/ hr TPN  CONT IV  Last administered on 4/6/20at 22:24;  Start 4/6/20 

at 22:00;  Stop 4/7/20 at 21:59;  Status DC


Propofol 20 ml @ As Directed STK-MED ONCE IV ;  Start 4/6/20 at 11:07;  Stop 4 /6/20 at 11:07;  Status DC


Cellulose (Surgicel Hemostat 4x8) 1 each STK-MED ONCE .ROUTE  Last administered 

on 4/6/20at 11:44;  Start 4/6/20 at 11:55;  Stop 4/6/20 at 11:56;  Status DC


Sevoflurane (Ultane) 60 ml STK-MED ONCE IH ;  Start 4/6/20 at 12:46;  Stop 

4/6/20 at 12:46;  Status DC


Sodium Chloride 1,000 ml @  1,000 mls/hr Q1H PRN IV hypotension;  Start 4/6/20 

at 13:51;  Stop 4/6/20 at 19:50;  Status DC


Albumin Human 200 ml @  200 mls/hr 1X PRN  PRN IV Hypotension Last administered 

on 4/6/20at 14:51;  Start 4/6/20 at 14:00;  Stop 4/6/20 at 19:59;  Status DC


Diphenhydramine HCl (Benadryl) 25 mg 1X PRN  PRN IV ITCHING;  Start 4/6/20 at 

14:00;  Stop 4/7/20 at 13:59;  Status DC


Diphenhydramine HCl (Benadryl) 25 mg 1X PRN  PRN IV ITCHING;  Start 4/6/20 at 

14:00;  Stop 4/7/20 at 13:59;  Status DC


Sodium Chloride 1,000 ml @  400 mls/hr Q2H30M PRN IV PATENCY;  Start 4/6/20 at 

13:51;  Stop 4/7/20 at 01:50;  Status DC


Info (PHARMACY MONITORING -- do not chart) 1 each PRN DAILY  PRN MC SEE 

COMMENTS;  Start 4/6/20 at 14:00;  Stop 4/9/20 at 08:16;  Status DC


Heparin Sodium (Porcine) (Hep Lock Adult) 500 unit STK-MED ONCE IVP ;  Start 

4/7/20 at 09:29;  Stop 4/7/20 at 09:30;  Status DC


Sodium Chloride 1,000 ml @  1,000 mls/hr Q1H PRN IV hypotension;  Start 4/7/20 

at 10:43;  Stop 4/7/20 at 16:42;  Status DC


Sodium Chloride 1,000 ml @  400 mls/hr Q2H30M PRN IV PATENCY;  Start 4/7/20 at 

10:43;  Stop 4/7/20 at 22:42;  Status DC


Info (PHARMACY MONITORING -- do not chart) 1 each PRN DAILY  PRN MC SEE 

COMMENTS;  Start 4/7/20 at 10:45;  Status UNV


Info (PHARMACY MONITORING -- do not chart) 1 each PRN DAILY  PRN MC SEE 

COMMENTS;  Start 4/7/20 at 10:45;  Status UNV


Sodium Chloride 90 meq/Potassium Chloride 15 meq/ Magnesium Sulfate 12 

meq/Calcium Gluconate 15 meq/ Multivitamins 10 ml/Chromium/ Copper/Manganese/ 

Seleni/Zn 0.5 ml/ Insulin Human Regular 25 unit/ Total Parenteral 

Nutrition/Amino Acids/Dextrose/ Fat Emulsion Intravenous 1,400 ml @  58.333 mls/

hr TPN  CONT IV  Last administered on 4/7/20at 22:13;  Start 4/7/20 at 22:00;  S

top 4/8/20 at 21:59;  Status DC


Sodium Chloride 1,000 ml @  1,000 mls/hr Q1H PRN IV hypotension;  Start 4/8/20 

at 07:50;  Stop 4/8/20 at 13:49;  Status DC


Albumin Human 200 ml @  200 mls/hr 1X  ONCE IV ;  Start 4/8/20 at 08:00;  Stop 

4/8/20 at 08:53;  Status DC


Diphenhydramine HCl (Benadryl) 25 mg 1X PRN  PRN IV ITCHING;  Start 4/8/20 at 

08:00;  Stop 4/9/20 at 07:59;  Status DC


Diphenhydramine HCl (Benadryl) 25 mg 1X PRN  PRN IV ITCHING;  Start 4/8/20 at 

08:00;  Stop 4/9/20 at 07:59;  Status DC


Info (PHARMACY MONITORING -- do not chart) 1 each PRN DAILY  PRN MC SEE 

COMMENTS;  Start 4/8/20 at 08:00;  Stop 4/9/20 at 08:16;  Status DC


Albumin Human 50 ml @ 50 mls/hr 1X  ONCE IV ;  Start 4/8/20 at 08:53;  Stop 

4/8/20 at 08:56;  Status DC


Albumin Human 200 ml @  50 mls/hr PRN 1X  PRN IV HYPOTENSION Last administered 

on 4/14/20at 11:54;  Start 4/8/20 at 09:00


Meropenem 500 mg/ Sodium Chloride 50 ml @  100 mls/hr Q12H IV  Last administered

on 4/28/20at 10:45;  Start 4/8/20 at 10:00;  Stop 4/28/20 at 12:37;  Status DC


Sodium Chloride 90 meq/Magnesium Sulfate 12 meq/ Calcium Gluconate 15 meq/ 

Multivitamins 10 ml/Chromium/ Copper/Manganese/ Seleni/Zn 0.5 ml/ Insulin Human 

Regular 25 unit/ Total Parenteral Nutrition/Amino Acids/Dextrose/ Fat Emulsion 

Intravenous 1,400 ml @  58.333 mls/ hr TPN  CONT IV  Last administered on 

4/8/20at 21:41;  Start 4/8/20 at 22:00;  Stop 4/9/20 at 21:59;  Status DC


Sodium Chloride 1,000 ml @  1,000 mls/hr Q1H PRN IV hypotension;  Start 4/9/20 

at 07:58;  Stop 4/9/20 at 13:57;  Status DC


Albumin Human 200 ml @  200 mls/hr 1X PRN  PRN IV Hypotension Last administered 

on 4/9/20at 09:30;  Start 4/9/20 at 08:00;  Stop 4/9/20 at 13:59;  Status DC


Sodium Chloride 1,000 ml @  400 mls/hr Q2H30M PRN IV PATENCY;  Start 4/9/20 at 

07:58;  Stop 4/9/20 at 19:57;  Status DC


Info (PHARMACY MONITORING -- do not chart) 1 each PRN DAILY  PRN MC SEE 

COMMENTS;  Start 4/9/20 at 08:00;  Status Cancel


Info (PHARMACY MONITORING -- do not chart) 1 each PRN DAILY  PRN MC SEE 

COMMENTS;  Start 4/9/20 at 08:15;  Status UNV


Sodium Chloride 90 meq/Potassium Phosphate 5 mmol/ Magnesium Sulfate 12 

meq/Calcium Gluconate 15 meq/ Multivitamins 10 ml/Chromium/ Copper/Manganese/ 

Seleni/Zn 0.5 ml/ Insulin Human Regular 30 unit/ Total Parenteral 

Nutrition/Amino Acids/Dextrose/ Fat Emulsion Intravenous 1,400 ml @  58.333 mls/

hr TPN  CONT IV  Last administered on 4/9/20at 22:08;  Start 4/9/20 at 22:00;  

Stop 4/10/20 at 21:59;  Status DC


Linezolid/Dextrose 300 ml @  300 mls/hr Q12HR IV  Last administered on 4/20/20at

20:40;  Start 4/10/20 at 11:00;  Stop 4/21/20 at 08:10;  Status DC


Sodium Chloride 90 meq/Potassium Phosphate 15 mmol/ Magnesium Sulfate 12 

meq/Calcium Gluconate 15 meq/ Multivitamins 10 ml/Chromium/ Copper/Manganese/ 

Seleni/Zn 0.5 ml/ Insulin Human Regular 30 unit/ Total Parenteral 

Nutrition/Amino Acids/Dextrose/ Fat Emulsion Intravenous 1,400 ml @  58.333 mls/

hr TPN  CONT IV  Last administered on 4/10/20at 21:49;  Start 4/10/20 at 22:00; 

Stop 4/11/20 at 21:59;  Status DC


Sodium Chloride 90 meq/Potassium Phosphate 15 mmol/ Magnesium Sulfate 12 

meq/Calcium Gluconate 15 meq/ Multivitamins 10 ml/Chromium/ Copper/Manganese/ 

Seleni/Zn 0.5 ml/ Insulin Human Regular 40 unit/ Total Parenteral 

Nutrition/Amino Acids/Dextrose/ Fat Emulsion Intravenous 1,400 ml @  58.333 mls/

hr TPN  CONT IV  Last administered on 4/11/20at 21:21;  Start 4/11/20 at 22:00; 

Stop 4/12/20 at 21:59;  Status DC


Sodium Chloride 1,000 ml @  1,000 mls/hr Q1H PRN IV hypotension;  Start 4/11/20 

at 13:26;  Stop 4/11/20 at 19:25;  Status DC


Albumin Human 200 ml @  200 mls/hr 1X PRN  PRN IV Hypotension Last administered 

on 4/11/20at 15:00;  Start 4/11/20 at 13:30;  Stop 4/11/20 at 19:29;  Status DC


Sodium Chloride (Normal Saline Flush) 10 ml 1X PRN  PRN IV AP catheter pack;  

Start 4/11/20 at 13:30;  Stop 4/12/20 at 13:29;  Status DC


Sodium Chloride (Normal Saline Flush) 10 ml 1X PRN  PRN IV  catheter pack;  

Start 4/11/20 at 13:30;  Stop 4/12/20 at 13:29;  Status DC


Sodium Chloride 1,000 ml @  400 mls/hr Q2H30M PRN IV PATENCY;  Start 4/11/20 at 

13:26;  Stop 4/12/20 at 01:25;  Status DC


Info (PHARMACY MONITORING -- do not chart) 1 each PRN DAILY  PRN MC SEE 

COMMENTS;  Start 4/11/20 at 13:30;  Stop 4/11/20 at 13:33;  Status DC


Info (PHARMACY MONITORING -- do not chart) 1 each PRN DAILY  PRN MC SEE 

COMMENTS;  Start 4/11/20 at 13:30;  Stop 4/11/20 at 13:34;  Status DC


Sodium Chloride 90 meq/Potassium Phosphate 19 mmol/ Magnesium Sulfate 12 

meq/Calcium Gluconate 15 meq/ Multivitamins 10 ml/Chromium/ Copper/Manganese/ 

Seleni/Zn 0.5 ml/ Insulin Human Regular 40 unit/ Total Parenteral 

Nutrition/Amino Acids/Dextrose/ Fat Emulsion Intravenous 1,400 ml @  58.333 mls/

hr TPN  CONT IV  Last administered on 4/12/20at 21:54;  Start 4/12/20 at 22:00; 

Stop 4/13/20 at 21:59;  Status DC


Sodium Chloride 1,000 ml @  1,000 mls/hr Q1H PRN IV hypotension;  Start 4/13/20 

at 09:35;  Stop 4/13/20 at 15:34;  Status DC


Albumin Human 200 ml @  200 mls/hr 1X PRN  PRN IV Hypotension;  Start 4/13/20 at

09:45;  Stop 4/13/20 at 15:44;  Status DC


Diphenhydramine HCl (Benadryl) 25 mg 1X PRN  PRN IV ITCHING;  Start 4/13/20 at 

09:45;  Stop 4/14/20 at 09:44;  Status DC


Diphenhydramine HCl (Benadryl) 25 mg 1X PRN  PRN IV ITCHING;  Start 4/13/20 at 

09:45;  Stop 4/14/20 at 09:44;  Status DC


Sodium Chloride 1,000 ml @  400 mls/hr Q2H30M PRN IV PATENCY;  Start 4/13/20 at 

09:35;  Stop 4/13/20 at 21:34;  Status DC


Info (PHARMACY MONITORING -- do not chart) 1 each PRN DAILY  PRN MC SEE 

COMMENTS;  Start 4/13/20 at 09:45;  Status Cancel


Sodium Chloride 100 meq/Potassium Phosphate 19 mmol/ Magnesium Sulfate 12 

meq/Calcium Gluconate 15 meq/ Multivitamins 10 ml/Chromium/ Copper/Manganese/ 

Seleni/Zn 0.5 ml/ Insulin Human Regular 40 unit/ Potassium Chloride 20 meq/ 

Total Parenteral Nutrition/Amino Acids/Dextrose/ Fat Emulsion Intravenous 1,400 

ml @  58.333 mls/ hr TPN  CONT IV  Last administered on 4/13/20at 22:02;  Start 

4/13/20 at 22:00;  Stop 4/14/20 at 21:59;  Status DC


Furosemide (Lasix) 40 mg 1X  ONCE IVP  Last administered on 4/13/20at 14:39;  

Start 4/13/20 at 14:30;  Stop 4/13/20 at 14:31;  Status DC


Metronidazole 100 ml @  100 mls/hr Q8HRS IV  Last administered on 4/21/20at 

06:04;  Start 4/14/20 at 10:00;  Stop 4/21/20 at 08:10;  Status DC


Sodium Chloride 1,000 ml @  1,000 mls/hr Q1H PRN IV hypotension;  Start 4/14/20 

at 08:00;  Stop 4/14/20 at 13:59;  Status DC


Albumin Human 200 ml @  200 mls/hr 1X PRN  PRN IV Hypotension;  Start 4/14/20 at

08:00;  Stop 4/14/20 at 13:59;  Status DC


Sodium Chloride 1,000 ml @  400 mls/hr Q2H30M PRN IV PATENCY;  Start 4/14/20 at 

08:00;  Stop 4/14/20 at 19:59;  Status DC


Info (PHARMACY MONITORING -- do not chart) 1 each PRN DAILY  PRN MC SEE 

COMMENTS;  Start 4/14/20 at 11:30;  Status UNV


Info (PHARMACY MONITORING -- do not chart) 1 each PRN DAILY  PRN MC SEE 

COMMENTS;  Start 4/14/20 at 11:30;  Stop 4/16/20 at 12:13;  Status DC


Sodium Chloride 100 meq/Potassium Phosphate 19 mmol/ Magnesium Sulfate 12 

meq/Calcium Gluconate 15 meq/ Multivitamins 10 ml/Chromium/ Copper/Manganese/ 

Seleni/Zn 0.5 ml/ Insulin Human Regular 40 unit/ Potassium Chloride 20 meq/ 

Total Parenteral Nutrition/Amino Acids/Dextrose/ Fat Emulsion Intravenous 1,400 

ml @  58.333 mls/ hr TPN  CONT IV  Last administered on 4/14/20at 21:52;  Start 

4/14/20 at 22:00;  Stop 4/15/20 at 21:59;  Status DC


Sodium Chloride (Normal Saline Flush) 10 ml QSHIFT  PRN IV AFTER MEDS AND BLOOD 

DRAWS;  Start 4/14/20 at 15:00


Sodium Chloride (Normal Saline Flush) 10 ml PRN Q5MIN  PRN IV AFTER MEDS AND 

BLOOD DRAWS;  Start 4/14/20 at 15:00


Sodium Chloride (Normal Saline Flush) 20 ml PRN Q5MIN  PRN IV AFTER MEDS AND 

BLOOD DRAWS;  Start 4/14/20 at 15:00


Sodium Chloride 100 meq/Potassium Phosphate 19 mmol/ Magnesium Sulfate 12 

meq/Calcium Gluconate 15 meq/ Multivitamins 10 ml/Chromium/ Copper/Manganese/ 

Seleni/Zn 0.5 ml/ Insulin Human Regular 40 unit/ Potassium Chloride 20 meq/ 

Total Parenteral Nutrition/Amino Acids/Dextrose/ Fat Emulsion Intravenous 1,400 

ml @  58.333 mls/ hr TPN  CONT IV  Last administered on 4/15/20at 21:20;  Start 

4/15/20 at 22:00;  Stop 4/16/20 at 21:59;  Status DC


Lidocaine HCl (Buffered Lidocaine 1%) 3 ml STK-MED ONCE .ROUTE ;  Start 4/15/20 

at 13:16;  Stop 4/15/20 at 13:16;  Status DC


Lidocaine HCl (Buffered Lidocaine 1%) 6 ml 1X  ONCE INJ  Last administered on 4

/15/20at 13:45;  Start 4/15/20 at 13:30;  Stop 4/15/20 at 13:31;  Status DC


Albumin Human 100 ml @  100 mls/hr 1X  ONCE IV  Last administered on 4/15/20at 

15:41;  Start 4/15/20 at 15:00;  Stop 4/15/20 at 15:59;  Status DC


Albumin Human 50 ml @ 50 mls/hr 1X  ONCE IV  Last administered on 4/15/20at 

15:00;  Start 4/15/20 at 15:00;  Stop 4/15/20 at 15:59;  Status DC


Info (PHARMACY MONITORING -- do not chart) 1 each PRN DAILY  PRN MC SEE 

COMMENTS;  Start 4/16/20 at 11:30;  Status Cancel


Info (PHARMACY MONITORING -- do not chart) 1 each PRN DAILY  PRN MC SEE 

COMMENTS;  Start 4/16/20 at 11:30;  Status UNV


Sodium Chloride 100 meq/Potassium Phosphate 10 mmol/ Magnesium Sulfate 12 

meq/Calcium Gluconate 15 meq/ Multivitamins 10 ml/Chromium/ Copper/Manganese/ 

Seleni/Zn 0.5 ml/ Insulin Human Regular 35 unit/ Potassium Chloride 20 meq/ 

Total Parenteral Nutrition/Amino Acids/Dextrose/ Fat Emulsion Intravenous 1,400 

ml @  58.333 mls/ hr TPN  CONT IV  Last administered on 4/16/20at 22:10;  Start 

4/16/20 at 22:00;  Stop 4/17/20 at 21:59;  Status DC


Sodium Chloride 100 meq/Potassium Phosphate 5 mmol/ Magnesium Sulfate 12 

meq/Calcium Gluconate 15 meq/ Multivitamins 10 ml/Chromium/ Copper/Manganese/ 

Seleni/Zn 0.5 ml/ Insulin Human Regular 35 unit/ Potassium Chloride 20 meq/ 

Total Parenteral Nutrition/Amino Acids/Dextrose/ Fat Emulsion Intravenous 1,400 

ml @  58.333 mls/ hr TPN  CONT IV  Last administered on 4/17/20at 22:59;  Start 

4/17/20 at 22:00;  Stop 4/18/20 at 21:59;  Status DC


Sodium Chloride 1,000 ml @  1,000 mls/hr Q1H PRN IV hypotension;  Start 4/18/20 

at 08:27;  Stop 4/18/20 at 14:26;  Status DC


Albumin Human 200 ml @  200 mls/hr 1X PRN  PRN IV Hypotension Last administered 

on 4/18/20at 09:18;  Start 4/18/20 at 08:30;  Stop 4/18/20 at 14:29;  Status DC


Sodium Chloride 1,000 ml @  400 mls/hr Q2H30M PRN IV PATENCY;  Start 4/18/20 at 

08:27;  Stop 4/18/20 at 20:26;  Status DC


Info (PHARMACY MONITORING -- do not chart) 1 each PRN DAILY  PRN MC SEE 

COMMENTS;  Start 4/18/20 at 08:30;  Status Cancel


Info (PHARMACY MONITORING -- do not chart) 1 each PRN DAILY  PRN MC SEE 

COMMENTS;  Start 4/18/20 at 08:30;  Stop 4/26/20 at 13:10;  Status DC


Sodium Chloride 100 meq/Potassium Chloride 40 meq/ Magnesium Sulfate 15 

meq/Calcium Gluconate 15 meq/ Multivitamins 10 ml/Chromium/ Copper/Manganese/ 

Seleni/Zn 0.5 ml/ Insulin Human Regular 35 unit/ Total Parenteral 

Nutrition/Amino Acids/Dextrose/ Fat Emulsion Intravenous 1,400 ml @  58.333 mls/

hr TPN  CONT IV  Last administered on 4/18/20at 22:00;  Start 4/18/20 at 22:00; 

Stop 4/19/20 at 21:59;  Status DC


Potassium Chloride/Water 100 ml @  100 mls/hr 1X  ONCE IV  Last administered on 

4/18/20at 17:28;  Start 4/18/20 at 14:45;  Stop 4/18/20 at 15:44;  Status DC


Sodium Chloride 100 meq/Potassium Chloride 40 meq/ Magnesium Sulfate 15 

meq/Calcium Gluconate 15 meq/ Multivitamins 10 ml/Chromium/ Copper/Manganese/ 

Seleni/Zn 0.5 ml/ Insulin Human Regular 35 unit/ Total Parenteral N

utrition/Amino Acids/Dextrose/ Fat Emulsion Intravenous 1,400 ml @  58.333 mls/ 

hr TPN  CONT IV  Last administered on 4/19/20at 22:46;  Start 4/19/20 at 22:00; 

Stop 4/20/20 at 21:59;  Status DC


Sodium Chloride 100 meq/Potassium Chloride 40 meq/ Magnesium Sulfate 20 

meq/Calcium Gluconate 15 meq/ Multivitamins 10 ml/Chromium/ Copper/Manganese/ 

Seleni/Zn 0.5 ml/ Insulin Human Regular 35 unit/ Total Parenteral 

Nutrition/Amino Acids/Dextrose/ Fat Emulsion Intravenous 1,400 ml @  58.333 mls/

hr TPN  CONT IV  Last administered on 4/20/20at 22:31;  Start 4/20/20 at 22:00; 

Stop 4/21/20 at 21:59;  Status DC


Fentanyl Citrate (Fentanyl 2ml Vial) 50 mcg PRN Q2HR  PRN IVP PAIN Last 

administered on 4/27/20at 13:32;  Start 4/20/20 at 21:00;  Stop 4/28/20 at 

12:53;  Status DC


Fentanyl Citrate (Fentanyl 2ml Vial) 25 mcg PRN Q2HR  PRN IVP PAIN;  Start 

4/20/20 at 21:00;  Stop 4/28/20 at 12:54;  Status DC


Enoxaparin Sodium (Lovenox 100mg Syringe) 100 mg Q12HR SQ ;  Start 4/21/20 at 

21:00;  Status UNV


Amino Acids/ Glycerin/ Electrolytes 1,000 ml @  75 mls/hr [id number] IV ;  Start 

4/20/20 at 21:15;  Status UNV


Sodium Chloride 1,000 ml @  1,000 mls/hr Q1H PRN IV hypotension;  Start 4/21/20 

at 07:56;  Stop 4/21/20 at 13:55;  Status DC


Albumin Human 200 ml @  200 mls/hr 1X PRN  PRN IV Hypotension Last administered 

on 4/21/20at 08:40;  Start 4/21/20 at 08:00;  Stop 4/21/20 at 13:59;  Status DC


Sodium Chloride 1,000 ml @  400 mls/hr Q2H30M PRN IV PATENCY;  Start 4/21/20 at 

07:56;  Stop 4/21/20 at 19:55;  Status DC


Info (PHARMACY MONITORING -- do not chart) 1 each PRN DAILY  PRN MC SEE 

COMMENTS;  Start 4/21/20 at 08:00;  Status UNV


Info (PHARMACY MONITORING -- do not chart) 1 each PRN DAILY  PRN MC SEE 

COMMENTS;  Start 4/21/20 at 08:00;  Status UNV


Daptomycin 430 mg/ Sodium Chloride 50 ml @  100 mls/hr Q24H IV  Last 

administered on 4/21/20at 12:35;  Start 4/21/20 at 09:00;  Stop 4/21/20 at 

12:49;  Status DC


Sodium Chloride 100 meq/Potassium Chloride 40 meq/ Magnesium Sulfate 20 

meq/Calcium Gluconate 15 meq/ Multivitamins 10 ml/Chromium/ Copper/Manganese/ 

Seleni/Zn 0.5 ml/ Insulin Human Regular 35 unit/ Total Parenteral 

Nutrition/Amino Acids/Dextrose/ Fat Emulsion Intravenous 1,400 ml @  58.333 mls/

hr TPN  CONT IV  Last administered on 4/21/20at 21:26;  Start 4/21/20 at 22:00; 

Stop 4/22/20 at 21:59;  Status DC


Daptomycin 430 mg/ Sodium Chloride 50 ml @  100 mls/hr Q48H IV ;  Start 4/23/20 

at 09:00;  Stop 4/22/20 at 11:55;  Status DC


Sodium Chloride 100 meq/Potassium Chloride 40 meq/ Magnesium Sulfate 20 

meq/Calcium Gluconate 15 meq/ Multivitamins 10 ml/Chromium/ Copper/Manganese/ 

Seleni/Zn 0.5 ml/ Insulin Human Regular 35 unit/ Total Parenteral 

Nutrition/Amino Acids/Dextrose/ Fat Emulsion Intravenous 1,400 ml @  58.333 mls/

hr TPN  CONT IV  Last administered on 4/22/20at 22:27;  Start 4/22/20 at 22:00; 

Stop 4/23/20 at 21:59;  Status DC


Daptomycin 430 mg/ Sodium Chloride 50 ml @  100 mls/hr Q24H IV  Last 

administered on 4/24/20at 15:07;  Start 4/22/20 at 13:00;  Stop 4/25/20 at 

13:15;  Status DC


Sodium Chloride 100 meq/Potassium Chloride 40 meq/ Magnesium Sulfate 20 

meq/Calcium Gluconate 10 meq/ Multivitamins 10 ml/Chromium/ Copper/Manganese/ 

Seleni/Zn 0.5 ml/ Insulin Human Regular 35 unit/ Total Parenteral 

Nutrition/Amino Acids/Dextrose/ Fat Emulsion Intravenous 1,400 ml @  58.333 mls/

hr TPN  CONT IV  Last administered on 4/24/20at 00:06;  Start 4/23/20 at 22:00; 

Stop 4/24/20 at 21:59;  Status DC


Alteplase, Recombinant (Cathflo For Central Catheter Clearance) 1 mg 1X  ONCE 

INT CAT  Last administered on 4/24/20at 11:44;  Start 4/24/20 at 10:45;  Stop 

4/24/20 at 10:46;  Status DC


Ondansetron HCl (Zofran) 4 mg PRN Q6HRS  PRN IV NAUSEA/VOMITING;  Start 4/27/20 

at 07:00;  Stop 4/28/20 at 06:59;  Status DC


Fentanyl Citrate (Fentanyl 2ml Vial) 25 mcg PRN Q5MIN  PRN IV MILD PAIN 1-3;  

Start 4/27/20 at 07:00;  Stop 4/28/20 at 06:59;  Status DC


Fentanyl Citrate (Fentanyl 2ml Vial) 50 mcg PRN Q5MIN  PRN IV MODERATE TO SEVERE

PAIN Last administered on 4/27/20at 10:17;  Start 4/27/20 at 07:00;  Stop 

4/28/20 at 06:59;  Status DC


Ringer's Solution 1,000 ml @  30 mls/hr Q24H IV ;  Start 4/27/20 at 07:00;  Stop

4/27/20 at 18:59;  Status DC


Lidocaine HCl (Xylocaine-Mpf 1% 2ml Vial) 2 ml PRN 1X  PRN ID PRIOR TO IV START;

 Start 4/27/20 at 07:00;  Stop 4/28/20 at 06:59;  Status DC


Prochlorperazine Edisylate (Compazine) 5 mg PACU PRN  PRN IV NAUSEA, MRX1;  

Start 4/27/20 at 07:00;  Stop 4/28/20 at 06:59;  Status DC


Sodium Acetate 50 meq/Potassium Acetate 55 meq/ Magnesium Sulfate 20 meq/Calcium

Gluconate 10 meq/ Multivitamins 10 ml/Chromium/ Copper/Manganese/ Seleni/Zn 0.5 

ml/ Insulin Human Regular 35 unit/ Total Parenteral Nutrition/Amino 

Acids/Dextrose/ Fat Emulsion Intravenous 1,400 ml @  58.333 mls/ hr TPN  CONT IV

;  Start 4/24/20 at 22:00;  Stop 4/24/20 at 14:15;  Status DC


Sodium Acetate 50 meq/Potassium Acetate 55 meq/ Magnesium Sulfate 20 meq/Calcium

Gluconate 10 meq/ Multivitamins 10 ml/Chromium/ Copper/Manganese/ Seleni/Zn 0.5 

ml/ Insulin Human Regular 35 unit/ Total Parenteral Nutrition/Amino 

Acids/Dextrose/ Fat Emulsion Intravenous 1,800 ml @  75 mls/hr TPN  CONT IV  

Last administered on 4/24/20at 22:38;  Start 4/24/20 at 22:00;  Stop 4/25/20 at 

21:59;  Status DC


Sodium Chloride 1,000 ml @  1,000 mls/hr Q1H PRN IV hypotension;  Start 4/24/20 

at 15:31;  Stop 4/24/20 at 21:30;  Status DC


Diphenhydramine HCl (Benadryl) 25 mg 1X PRN  PRN IV ITCHING;  Start 4/24/20 at 

15:45;  Stop 4/25/20 at 15:44;  Status DC


Diphenhydramine HCl (Benadryl) 25 mg 1X PRN  PRN IV ITCHING;  Start 4/24/20 at 

15:45;  Stop 4/25/20 at 15:44;  Status DC


Sodium Chloride 1,000 ml @  400 mls/hr Q2H30M PRN IV PATENCY;  Start 4/24/20 at 

15:31;  Stop 4/25/20 at 03:30;  Status DC


Info (PHARMACY MONITORING -- do not chart) 1 each PRN DAILY  PRN MC SEE 

COMMENTS;  Start 4/24/20 at 15:45


Sodium Acetate 50 meq/Potassium Acetate 55 meq/ Magnesium Sulfate 20 meq/Calcium

Gluconate 10 meq/ Multivitamins 10 ml/Chromium/ Copper/Manganese/ Seleni/Zn 0.5 

ml/ Insulin Human Regular 35 unit/ Total Parenteral Nutrition/Amino 

Acids/Dextrose/ Fat Emulsion Intravenous 1,800 ml @  75 mls/hr TPN  CONT IV  

Last administered on 4/25/20at 22:03;  Start 4/25/20 at 22:00;  Stop 4/26/20 at 

21:59;  Status DC


Daptomycin 430 mg/ Sodium Chloride 50 ml @  100 mls/hr Q24H IV  Last 

administered on 4/30/20at 13:00;  Start 4/25/20 at 13:00;  Stop 4/30/20 at 

20:58;  Status DC


Heparin Sodium (Porcine) 1000 unit/Sodium Chloride 1,001 ml @  1,001 mls/hr 1X  

ONCE IRR ;  Start 4/27/20 at 06:00;  Stop 4/27/20 at 06:59;  Status DC


Potassium Acetate 55 meq/Magnesium Sulfate 20 meq/ Calcium Gluconate 10 meq/ 

Multivitamins 10 ml/Chromium/ Copper/Manganese/ Seleni/Zn 0.5 ml/ Insulin Human 

Regular 35 unit/ Total Parenteral Nutrition/Amino Acids/Dextrose/ Fat Emulsion 

Intravenous 1,920 ml @  80 mls/hr TPN  CONT IV  Last administered on 4/26/20at 

22:10;  Start 4/26/20 at 22:00;  Stop 4/27/20 at 21:59;  Status DC


Dexamethasone Sodium Phosphate (Decadron) 4 mg STK-MED ONCE .ROUTE ;  Start 

4/27/20 at 10:56;  Stop 4/27/20 at 10:57;  Status DC


Ondansetron HCl (Zofran) 4 mg STK-MED ONCE .ROUTE ;  Start 4/27/20 at 10:56;  

Stop 4/27/20 at 10:57;  Status DC


Rocuronium Bromide (Zemuron) 50 mg STK-MED ONCE .ROUTE ;  Start 4/27/20 at 

10:56;  Stop 4/27/20 at 10:57;  Status DC


Fentanyl Citrate (Fentanyl 2ml Vial) 100 mcg STK-MED ONCE .ROUTE ;  Start 

4/27/20 at 10:56;  Stop 4/27/20 at 10:57;  Status DC


Bupivacaine HCl/ Epinephrine Bitart (Sensorcain-Epi 0.5%-1:253183 Mpf) 30 ml 

STK-MED ONCE .ROUTE  Last administered on 4/27/20at 12:01;  Start 4/27/20 at 

10:58;  Stop 4/27/20 at 10:58;  Status DC


Cellulose (Surgicel Hemostat 2x14) 1 each STK-MED ONCE .ROUTE ;  Start 4/27/20 

at 10:58;  Stop 4/27/20 at 10:59;  Status DC


Iohexol (Omnipaque 300 Mg/ml) 50 ml STK-MED ONCE .ROUTE ;  Start 4/27/20 at 

10:58;  Stop 4/27/20 at 10:59;  Status DC


Cellulose (Surgicel Hemostat 4x8) 1 each STK-MED ONCE .ROUTE ;  Start 4/27/20 at

10:58;  Stop 4/27/20 at 10:59;  Status DC


Bisacodyl (Dulcolax Supp) 10 mg STK-MED ONCE .ROUTE ;  Start 4/27/20 at 10:59;  

Stop 4/27/20 at 10:59;  Status DC


Heparin Sodium (Porcine) 1000 unit/Sodium Chloride 1,001 ml @  1,001 mls/hr 1X  

ONCE IRR ;  Start 4/27/20 at 12:00;  Stop 4/27/20 at 12:59;  Status DC


Propofol 20 ml @ As Directed STK-MED ONCE IV ;  Start 4/27/20 at 11:05;  Stop 

4/27/20 at 11:05;  Status DC


Sevoflurane (Ultane) 90 ml STK-MED ONCE IH ;  Start 4/27/20 at 11:05;  Stop 

4/27/20 at 11:05;  Status DC


Sevoflurane (Ultane) 60 ml STK-MED ONCE IH ;  Start 4/27/20 at 12:26;  Stop 

4/27/20 at 12:27;  Status DC


Propofol 20 ml @ As Directed STK-MED ONCE IV ;  Start 4/27/20 at 12:26;  Stop 

4/27/20 at 12:27;  Status DC


Phenylephrine HCl (PHENYLEPHRINE in 0.9% NACL PF) 1 mg STK-MED ONCE IV ;  Start 

4/27/20 at 12:34;  Stop 4/27/20 at 12:34;  Status DC


Heparin Sodium (Porcine) (Heparin Sodium) 5,000 unit Q12HR SQ  Last administered

on 5/6/20at 20:57;  Start 4/27/20 at 21:00;  Stop 5/7/20 at 09:59;  Status DC


Sodium Chloride (Normal Saline Flush) 3 ml QSHIFT  PRN IV AFTER MEDS AND BLOOD 

DRAWS;  Start 4/27/20 at 13:45


Naloxone HCl (Narcan) 0.4 mg PRN Q2MIN  PRN IV SEE INSTRUCTIONS;  Start 4/27/20 

at 13:45


Sodium Chloride 1,000 ml @  25 mls/hr Q24H IV  Last administered on 5/9/20at 

02:30;  Start 4/27/20 at 13:37


Naloxone HCl (Narcan) 0.4 mg PRN Q2MIN  PRN IV SEE INSTRUCTIONS;  Start 4/27/20 

at 14:30;  Status UNV


Sodium Chloride 1,000 ml @  25 mls/hr Q24H IV ;  Start 4/27/20 at 14:30;  Status

UNV


Hydromorphone HCl 30 ml @ 0 mls/hr CONT PRN  PRN IV PER PROTOCOL Last 

administered on 5/2/20at 16:08;  Start 4/27/20 at 14:30;  Stop 5/4/20 at 08:55; 

Status DC


Potassium Acetate 55 meq/Magnesium Sulfate 20 meq/ Calcium Gluconate 10 meq/ 

Multivitamins 10 ml/Chromium/ Copper/Manganese/ Seleni/Zn 0.5 ml/ Insulin Human 

Regular 35 unit/ Total Parenteral Nutrition/Amino Acids/Dextrose/ Fat Emulsion 

Intravenous 1,920 ml @  80 mls/hr TPN  CONT IV  Last administered on 4/27/20at 

22:01;  Start 4/27/20 at 22:00;  Stop 4/28/20 at 21:59;  Status DC


Bumetanide (Bumex) 2 mg [id number] IV  Last administered on 5/1/20at 13:50;  Start 

4/28/20 at 14:00;  Stop 5/2/20 at 14:10;  Status DC


Meropenem 1 gm/ Sodium Chloride 100 ml @  200 mls/hr Q8HRS IV  Last administered

on 5/9/20at 05:44;  Start 4/28/20 at 14:00


Potassium Acetate 55 meq/Magnesium Sulfate 20 meq/ Calcium Gluconate 10 meq/ 

Multivitamins 10 ml/Chromium/ Copper/Manganese/ Seleni/Zn 0.5 ml/ Insulin Human 

Regular 35 unit/ Total Parenteral Nutrition/Amino Acids/Dextrose/ Fat Emulsion 

Intravenous 1,920 ml @  80 mls/hr TPN  CONT IV  Last administered on 4/28/20at 

22:02;  Start 4/28/20 at 22:00;  Stop 4/29/20 at 21:59;  Status DC


Hydromorphone HCl (Dilaudid Standard PCA) 12 mg STK-MED ONCE IV ;  Start 4/27/20

at 14:35;  Stop 4/28/20 at 13:53;  Status DC


Artificial Tears (Artificial Tears) 1 drop PRN Q15MIN  PRN OU DRY EYE Last 

administered on 5/8/20at 22:00;  Start 4/29/20 at 05:30


Hydromorphone HCl (Dilaudid Standard PCA) 12 mg STK-MED ONCE IV ;  Start 4/28/20

at 12:05;  Stop 4/29/20 at 09:15;  Status DC


Potassium Acetate 65 meq/Magnesium Sulfate 20 meq/ Calcium Gluconate 10 meq/ 

Multivitamins 10 ml/Chromium/ Copper/Manganese/ Seleni/Zn 0.5 ml/ Insulin Human 

Regular 30 unit/ Total Parenteral Nutrition/Amino Acids/Dextrose/ Fat Emulsion 

Intravenous 1,920 ml @  80 mls/hr TPN  CONT IV  Last administered on 4/29/20at 

22:22;  Start 4/29/20 at 22:00;  Stop 4/30/20 at 21:59;  Status DC


Cyclobenzaprine HCl (Flexeril) 10 mg PRN Q6HRS  PRN PO MUSCLE SPASMS;  Start 

4/30/20 at 10:45


Potassium Acetate 55 meq/Magnesium Sulfate 20 meq/ Calcium Gluconate 10 meq/ 

Multivitamins 10 ml/Chromium/ Copper/Manganese/ Seleni/Zn 0.5 ml/ Insulin Human 

Regular 30 unit/ Total Parenteral Nutrition/Amino Acids/Dextrose/ Fat Emulsion 

Intravenous 1,920 ml @  80 mls/hr TPN  CONT IV  Last administered on 5/1/20at 

01:00;  Start 4/30/20 at 22:00;  Stop 5/1/20 at 21:59;  Status DC


Magnesium Sulfate 50 ml @ 25 mls/hr 1X  ONCE IV  Last administered on 4/30/20at 

17:18;  Start 4/30/20 at 12:45;  Stop 4/30/20 at 14:44;  Status DC


Potassium Chloride/Water 100 ml @  100 mls/hr 1X  ONCE IV  Last administered on 

5/1/20at 11:27;  Start 5/1/20 at 12:00;  Stop 5/1/20 at 12:59;  Status DC


Hydromorphone HCl (Dilaudid Standard PCA) 12 mg STK-MED ONCE IV ;  Start 4/29/20

at 10:50;  Stop 5/1/20 at 11:02;  Status DC


Hydromorphone HCl (Dilaudid Standard PCA) 12 mg STK-MED ONCE IV ;  Start 4/30/20

at 13:47;  Stop 5/1/20 at 11:03;  Status DC


Potassium Acetate 30 meq/Magnesium Sulfate 20 meq/ Calcium Gluconate 10 meq/ 

Multivitamins 10 ml/Chromium/ Copper/Manganese/ Seleni/Zn 0.5 ml/ Insulin Human 

Regular 30 unit/ Potassium Chloride 30 meq/ Total Parenteral Nutrition/Amino 

Acids/Dextrose/ Fat Emulsion Intravenous 1,920 ml @  80 mls/hr TPN  CONT IV  

Last administered on 5/1/20at 22:34;  Start 5/1/20 at 22:00;  Stop 5/2/20 at 

21:59;  Status DC


Potassium Chloride/Water 100 ml @  100 mls/hr Q1H IV  Last administered on 

5/2/20at 13:05;  Start 5/2/20 at 07:00;  Stop 5/2/20 at 10:59;  Status DC


Magnesium Sulfate 50 ml @ 25 mls/hr 1X  ONCE IV  Last administered on 5/2/20at 

10:34;  Start 5/2/20 at 10:30;  Stop 5/2/20 at 12:29;  Status DC


Potassium Chloride 75 meq/ Magnesium Sulfate 20 meq/Calcium Gluconate 10 meq/ 

Multivitamins 10 ml/Chromium/ Copper/Manganese/ Seleni/Zn 0.5 ml/ Insulin Human 

Regular 30 unit/ Total Parenteral Nutrition/Amino Acids/Dextrose/ Fat Emulsion 

Intravenous 1,920 ml @  80 mls/hr TPN  CONT IV  Last administered on 5/2/20at 

21:51;  Start 5/2/20 at 22:00;  Stop 5/3/20 at 22:00;  Status DC


Potassium Chloride 75 meq/ Magnesium Sulfate 20 meq/Calcium Gluconate 10 meq/ 

Multivitamins 10 ml/Chromium/ Copper/Manganese/ Seleni/Zn 0.5 ml/ Insulin Human 

Regular 25 unit/ Total Parenteral Nutrition/Amino Acids/Dextrose/ Fat Emulsion 

Intravenous 1,920 ml @  80 mls/hr TPN  CONT IV  Last administered on 5/3/20at 

22:04;  Start 5/3/20 at 22:00;  Stop 5/4/20 at 21:59;  Status DC


Hydromorphone HCl (Dilaudid) 0.4 mg PRN Q4HRS  PRN IVP PAIN Last administered on

5/4/20at 10:57;  Start 5/4/20 at 09:00;  Stop 5/4/20 at 18:59;  Status DC


Micafungin Sodium 100 mg/Dextrose 100 ml @  100 mls/hr Q24H IV  Last 

administered on 5/9/20at 11:25;  Start 5/4/20 at 11:00


Daptomycin 485 mg/ Sodium Chloride 50 ml @  100 mls/hr Q24H IV  Last 

administered on 5/8/20at 11:29;  Start 5/4/20 at 11:00


Potassium Chloride 75 meq/ Magnesium Sulfate 15 meq/Calcium Gluconate 8 meq/ 

Multivitamins 10 ml/Chromium/ Copper/Manganese/ Seleni/Zn 0.5 ml/ Insulin Human 

Regular 25 unit/ Total Parenteral Nutrition/Amino Acids/Dextrose/ Fat Emulsion 

Intravenous 1,920 ml @  80 mls/hr TPN  CONT IV  Last administered on 5/4/20at 

23:08;  Start 5/4/20 at 22:00;  Stop 5/5/20 at 21:59;  Status DC


Haloperidol Lactate (Haldol Inj) 3 mg 1X  ONCE IVP  Last administered on 

5/4/20at 14:37;  Start 5/4/20 at 14:30;  Stop 5/4/20 at 14:31;  Status DC


Hydromorphone HCl (Dilaudid) 1 mg PRN Q4HRS  PRN IVP PAIN Last administered on 

5/9/20at 01:53;  Start 5/4/20 at 19:00


Potassium Chloride 75 meq/ Magnesium Sulfate 15 meq/Calcium Gluconate 8 meq/ 

Multivitamins 10 ml/Chromium/ Copper/Manganese/ Seleni/Zn 0.5 ml/ Insulin Human 

Regular 20 unit/ Total Parenteral Nutrition/Amino Acids/Dextrose/ Fat Emulsion 

Intravenous 1,920 ml @  80 mls/hr TPN  CONT IV  Last administered on 5/5/20at 

22:10;  Start 5/5/20 at 22:00;  Stop 5/6/20 at 21:59;  Status DC


Lidocaine HCl (Buffered Lidocaine 1%) 3 ml STK-MED ONCE .ROUTE ;  Start 5/6/20 

at 11:31;  Stop 5/6/20 at 11:31;  Status DC


Lidocaine HCl (Buffered Lidocaine 1%) 3 ml STK-MED ONCE .ROUTE ;  Start 5/6/20 

at 12:28;  Stop 5/6/20 at 12:29;  Status DC


Lidocaine HCl (Buffered Lidocaine 1%) 6 ml 1X  ONCE INJ  Last administered on 

5/6/20at 12:53;  Start 5/6/20 at 12:45;  Stop 5/6/20 at 12:46;  Status DC


Potassium Chloride 75 meq/ Magnesium Sulfate 15 meq/Calcium Gluconate 8 meq/ 

Multivitamins 10 ml/Chromium/ Copper/Manganese/ Seleni/Zn 0.5 ml/ Insulin Human 

Regular 20 unit/ Total Parenteral Nutrition/Amino Acids/Dextrose/ Fat Emulsion 

Intravenous 1,920 ml @  80 mls/hr TPN  CONT IV  Last administered on 5/6/20at 

22:00;  Start 5/6/20 at 22:00;  Stop 5/7/20 at 21:59;  Status DC


Potassium Chloride 75 meq/ Magnesium Sulfate 15 meq/Calcium Gluconate 8 meq/ 

Multivitamins 10 ml/Chromium/ Copper/Manganese/ Seleni/Zn 0.5 ml/ Insulin Human 

Regular 15 unit/ Total Parenteral Nutrition/Amino Acids/Dextrose/ Fat Emulsion 

Intravenous 1,920 ml @  80 mls/hr TPN  CONT IV  Last administered on 5/7/20at 

22:28;  Start 5/7/20 at 22:00;  Stop 5/8/20 at 21:59;  Status DC


Vecuronium Bromide (Norcuron Bolus) 6 mg PRN Q6HRS  PRN IV VENT ASYNCHRONY;  

Start 5/7/20 at 19:15;  Stop 5/7/20 at 19:35;  Status DC


Bumetanide (Bumex) 2 mg 1X  ONCE IV  Last administered on 5/7/20at 22:09;  Start

5/7/20 at 19:45;  Stop 5/7/20 at 19:46;  Status DC


Lidocaine HCl (Buffered Lidocaine 1%) 3 ml STK-MED ONCE .ROUTE ;  Start 5/8/20 

at 07:59;  Stop 5/8/20 at 07:59;  Status DC


Midazolam HCl (Versed) 5 mg STK-MED ONCE .ROUTE ;  Start 5/8/20 at 08:36;  Stop 

5/8/20 at 08:36;  Status DC


Fentanyl Citrate (Fentanyl 5ml Vial) 250 mcg STK-MED ONCE .ROUTE ;  Start 5/8/20

at 08:36;  Stop 5/8/20 at 08:37;  Status DC


Lidocaine HCl (Buffered Lidocaine 1%) 3 ml 1X  ONCE IJ  Last administered on 

5/8/20at 09:30;  Start 5/8/20 at 09:15;  Stop 5/8/20 at 09:16;  Status DC


Midazolam HCl (Versed) 5 mg 1X  ONCE IV  Last administered on 5/8/20at 09:30;  

Start 5/8/20 at 09:15;  Stop 5/8/20 at 09:16;  Status DC


Fentanyl Citrate (Fentanyl 5ml Vial) 250 mcg 1X  ONCE IV  Last administered on 

5/8/20at 09:30;  Start 5/8/20 at 09:15;  Stop 5/8/20 at 09:16;  Status DC


Bumetanide (Bumex) 2 mg DAILY IV  Last administered on 5/9/20at 11:24;  Start 

5/8/20 at 10:00


Potassium Chloride 75 meq/ Magnesium Sulfate 15 meq/ Multivitamins 10 

ml/Chromium/ Copper/Manganese/ Seleni/Zn 0.5 ml/ Insulin Human Regular 15 unit/ 

Total Parenteral Nutrition/Amino Acids/Dextrose/ Fat Emulsion Intravenous 1,920 

ml @  80 mls/hr TPN  CONT IV  Last administered on 5/8/20at 21:59;  Start 5/8/20

at 22:00;  Stop 5/9/20 at 21:59





Active Scripts


Active


Reported


Bisoprolol Fumarate 5 Mg Tablet 10 Mg PO DAILY


Vitals/I & O





Vital Sign - Last 24 Hours








 5/8/20 5/8/20 5/8/20 5/8/20





 14:00 15:00 15:24 16:00


 


Pulse 80 110  


 


Resp 32 36 38 


 


B/P (MAP) 109/55 (73) 172/78 (109)  


 


Pulse Ox 98 100 30 


 


O2 Delivery Tracheal Collar Tracheal Collar Tracheal Collar Trach Collar


 


O2 Flow Rate 8.0 8.0 8.0 





 5/8/20 5/8/20 5/8/20 5/8/20





 16:00 16:08 17:00 18:00


 


Temp 98.9   





 98.9   


 


Pulse 104  95 98


 


Resp 32 32 37 32


 


B/P (MAP) 145/66 (92)  136/74 (94) 134/64 (87)


 


Pulse Ox 98 38 97 98


 


O2 Delivery Tracheal Collar Tracheal Collar Tracheal Collar Tracheal Collar


 


O2 Flow Rate 8.0 8.0 8.0 8.0


 


    





    





 5/8/20 5/8/20 5/8/20 5/8/20





 19:00 20:00 20:00 21:00


 


Temp   98.1 





   98.1 


 


Pulse 97  105 107


 


Resp 34  35 34


 


B/P (MAP) 146/68 (94)  136/80 (98) 112/66 (81)


 


Pulse Ox 98  99 100


 


O2 Delivery Tracheal Collar Trach Collar Tracheal Collar Tracheal Collar


 


O2 Flow Rate 8.0  8.0 8.0


 


    





    





 5/8/20 5/8/20 5/8/20 5/8/20





 22:00 22:00 22:30 23:00


 


Pulse 120   119


 


Resp 45 44 38 32


 


B/P (MAP) 161/82 (108)   135/58 (83)


 


Pulse Ox 100 100 92 100


 


O2 Delivery Tracheal Collar   Tracheal Collar


 


O2 Flow Rate 8.0 8.0 8.0 8.0





 5/9/20 5/9/20 5/9/20 5/9/20





 00:00 00:00 01:00 01:53


 


Temp 98.7   





 98.7   


 


Pulse 126  132 


 


Resp 32  32 37


 


B/P (MAP) 149/75 (99)  168/84 (112) 


 


Pulse Ox 95  92 92


 


O2 Delivery Tracheal Collar Trach Collar Tracheal Collar Tracheal Collar


 


O2 Flow Rate 8.0  8.0 8.0


 


    





    





 5/9/20 5/9/20 5/9/20 5/9/20





 02:00 02:23 03:00 04:00


 


Temp    97.3





    97.3


 


Pulse 130  131 127


 


Resp 37 36 37 33


 


B/P (MAP) 171/80 (110)  180/86 (117) 170/86 (114)


 


Pulse Ox 93 92 92 92


 


O2 Delivery Tracheal Collar Tracheal Collar Tracheal Collar Tracheal Collar


 


O2 Flow Rate 8.0 8.0 8.0 8.0


 


    





    





 5/9/20 5/9/20 5/9/20 5/9/20





 04:00 04:47 05:00 06:00


 


Pulse   99 96


 


Resp   37 27


 


B/P (MAP)   120/66 (84) 108/61 (77)


 


Pulse Ox  99 97 100


 


O2 Delivery Trach Collar Ventilator Ventilator Ventilator





 5/9/20 5/9/20 5/9/20 5/9/20





 07:00 07:37 08:00 08:00


 


Temp   98.1 





   98.1 


 


Pulse 96  96 


 


Resp 27  27 


 


B/P (MAP) 128/71 (90)  118/71 (87) 


 


Pulse Ox 100 100 100 


 


O2 Delivery Ventilator Ventilator Ventilator Trach Collar


 


    





    





 5/9/20 5/9/20 5/9/20 5/9/20





 09:00 10:00 11:00 11:45


 


Pulse 96 96 96 


 


Resp 27 27 27 


 


B/P (MAP) 109/72 (84) 100/62 (75) 127/77 (94) 


 


Pulse Ox 100 100 100 99


 


O2 Delivery Ventilator Ventilator Ventilator Ventilator





 5/9/20   





 12:00   


 


O2 Delivery Trach Collar   














Intake and Output   


 


 5/8/20 5/8/20 5/9/20





 15:00 23:00 07:00


 


Intake Total 290 ml 1161 ml 1693.6 ml


 


Output Total 2900 ml 2050 ml 1365 ml


 


Balance -2610 ml -889 ml 328.6 ml











Hemodynamically unstable?:  No


Is patient in severe pain?:  Yes


Is NPO status required?:  Yes











MARCO ANTONIO PAZ MD            May 9, 2020 13:39

## 2020-05-09 NOTE — PDOC
PROGRESS NOTES


Chief Complaint


Chief Complaint


Acute hypoxic Respiratory failure requiring mechanical ventilation (on vent 

since 3/23)


Tracheostomy


bilateral pleural effusions/pulm edema 


Sepsis


Severe Acute gallstone pancreatitis (not a surgical candidate at this time) with

necrosis


Acute kidney failure now requiring dialysis


Salpingitis


Gallstones (Calculus of gallbladder with acute cholecystitis without 

obstruction)


HTN 


Leukocytosis 


Hypoxia


Uterine fibroid


Intractable pain


Intractable nausea


Covid 19 negative. 


Acute on chronic anemia 


EEG: No seizure activity


ESRD on HD


Hyperglycemia





Plan:


Continue with supportive measures


KUB and chest  x ray


will update surgical consultant 


discussed with pulmonary as well, no changes to her ventilatory support she 

continues with current setting. she seems quite agitated reassurance provided at

bedside.





History of Present Illness


History of Present Illness


Ms Tadeo is a 50yo F w/ PMHx HTN, prediabetes who presented to the emergency 

room with complaints of abdominal pain on 3/16/2020. Found with Lipase 84921, 

, , Bilirubin 1.4.


CT abdomen confirms pancreatic inflammation, peripancreatic fluid and 

inflammatory changes around the pancreas consistent with pancreatitis. 

Cholelithiasis and 1.4cm uterine fibroid as well as possible left salpingitis. 

Admitted for further care


GI, General surgery, ID, Pulm consulted.





3/17: PICC placed per IR. Renal US negative. Started on levophed. Repeat CT 

abdomen w/ necrosis; 3/18: Dialysis catheter per nephrology; 3/19: On BiPAP; 

3/20: BiPAP, dialysis; 3/21: Overnight Tmax 101.7 , still on BiPAP FiO2 40%, 

still on low dose Levophed gtt, TPN initiated. On dialysis


4/6: Tracheostomy; 4/12: S/p tracheostomy on vent spontaneous respirations with 

5 of pressure support 35% FiO2, rectal tube and a Chino, off pressors; 

4/14:Still on vent via trach. Removed PICC and CVC LIJ and replaced. CT 

chest/abd/pelvis with bilateral pleural effusion and ascites.


4/15: Renal function stable. Still on vent. More interactive today. Miming wish 

for food. Plan discussed for thoracentesis/paracentesis with daughters today. 

They were under impression patient was doing worse due to a miscommunication 

which has been clarified over the phone. 4.3L removed.


4/17: Febrile overnight 101.8F. More interactive, still on vent. Asking for ice 

by miming; 4/18: Afebrile overnight. TMax last 24 hours 100.6F. Hb 7.1. 

Interactive when awake. 4/22: Transfusion 1u PRBC (6U total since admit)


4/23-4/26: TPN and precedex, vent.


4/27: Tmax 101F overnight. Hb 8.2. HD cath out since 4/24. Alert. On vent SIMV 

35% FiO2. Surgery: ex-lap, no naif or pancreatic necrosectomy 2/2 profound 

inflammation.


4/28: Seen POD #1. Afebrile overnight. BUN 62. CBC WNL today.Remains on vent via

trach, TPN. Able to point today and indicate she wishes her daughters and Rolf 

to be involved in her care.


4/29: Hb 7.4, Na 151. Remains on vent via trach, TPN. off HD for now. Not 

tolerating trach shield well.


4/30: Negative US for UE DVT. More relaxed today. Has some increase in UOP. 

Tolerating vent well. She is requesting to eat and drink via writing. T max 100F

24 hours ago, but 101F at 1200. Right PICC placed. Speaking valve for half the 

day in [facility] [place]


5/1: Na 153 today. Good UOP. Tmax 101F at 1200 on 4/30. She becomes a bit 

anxious and did not sleep much last night, wishes to try to sleep this morning


5/2: Able to speak well with speaking valve today. Na 153, K2.9, Mg 1.8, Cr 1. 

100.4F axillary temp overnight. Asking for food.


5/3: Afebrile overnight. ABG with pH 7.27/75/123. Hb 7.1. Na 153. PCA settings 

decreased


5/4: No acute events reported overnight, case discussed with nursing staff 

patient in no acute distress no complaints during my visit


5/5: Patient responding to verbal stimuli.  She is saturating well and not 

requiring ventilation support, no acute events reported overnight seems to be 

slowly improving


5/6: Patient requiring transfusion of packed red blood cells due to anemia, no 

evidence of acute bleeding, appreciate GI consultant recommendations


5/7: Patient required ventilatory support given that she desaturated, she is 

lying in bed in mild distress, case discussed with nursing staff, no evidence of

bleeding, h an h noted. 


5/8: Underwent IR procedure as follows


BRIEF OPERATIVE NOTE


Pre-Op Diagnosis


Pancreatitis with pseudocysts, suspected infection


Post-Op Diagnosis


same


Procedure Performed


CT abdominal Drains x 3


Surgeon


Hardy


Anesthesia Type:  Conscious Sedation


Findings


3 abdominal drains, 14F, with turbid pancreatic fluid and necrotic debris in 

each.


Complications


No immediate





Plan:


vent support, wean if possible throughout the day 


Encourage activity as tolerated


Monitor fever curve, no obvious source of infection, possibly some aspiration 

events, atelectasis


will monitor for bleeding. 





5/9: Patient today somewhat restless and having bilious secretions from ET tube,

imaging studies ordered, discussed with consultant. Pretty poor prognosis, 

hopefully is not a fistula, poor surgical candidate.





Vitals


Vitals





Vital Signs








  Date Time  Temp Pulse Resp B/P (MAP) Pulse Ox O2 Delivery O2 Flow Rate FiO2


 


5/9/20 15:30     97 Ventilator  


 


5/9/20 11:00  96 27 127/77 (94)    


 


5/9/20 08:00 98.1       





 98.1       


 


5/9/20 04:00       8.0 











Physical Exam


Physical Exam


GENERAL: Propped up in bed, appears weak, tired


HEENT:  oral cavity dry, NGT


NECK:  Trach/vent 


LUNGS: rhonchi 


HEART:  S1, S2, regular 


ABDOMEN: Distended, hypoactive BS, tender, + drains 


: Chino (4/14)


EXTREMITIES: Generalized edema, no cyanosis, SCDs bilaterally 


SKIN: Warm and dry.  No generalized rash.  


CNS: mouthing words to simple questions 


PICC(4/30) clean


General:  Alert, Oriented X3, Cooperative, No acute distress


Heart:  Regular rate, Normal S1, Normal S2, No murmurs, Gallops


Lungs:  Crackles


Abdomen:  Normal bowel sounds, Soft, No tenderness, No hepatosplenomegaly, No 

masses


Extremities:  No clubbing, No cyanosis, No edema, Normal pulses, No 

tenderness/swelling


Skin:  Other (mottling noted to extremities )





Labs


LABS





Laboratory Tests








Test


 5/8/20


18:13 5/9/20


00:18 5/9/20


06:00 5/9/20


13:34


 


Glucose (Fingerstick)


 150 mg/dL


(70-99) 159 mg/dL


(70-99) 139 mg/dL


(70-99) 182 mg/dL


(70-99)


 


Sodium Level


 


 


 149 mmol/L


(136-145) 





 


Potassium Level


 


 


 3.9 mmol/L


(3.5-5.1) 





 


Chloride Level


 


 


 111 mmol/L


() 





 


Carbon Dioxide Level


 


 


 31 mmol/L


(21-32) 





 


Anion Gap   7 (6-14)  


 


Blood Urea Nitrogen


 


 


 33 mg/dL


(7-20) 





 


Creatinine


 


 


 0.8 mg/dL


(0.6-1.0) 





 


Estimated GFR


(Cockcroft-Gault) 


 


 76.2 


 





 


Glucose Level


 


 


 145 mg/dL


(70-99) 





 


Calcium Level


 


 


 8.7 mg/dL


(8.5-10.1) 














Assessment and Plan


Assessmemt and Plan


Problems


Medical Problems:


(1) Acute pancreatitis


Status: Acute  





(2) Cholelithiasis


Status: Acute  











Comment


Review of Relevant


I have reviewed the following items josy (where applicable) has been applied.


Labs





Laboratory Tests








Test


 5/7/20


17:57 5/8/20


00:26 5/8/20


05:45 5/8/20


05:46


 


Glucose (Fingerstick)


 152 mg/dL


(70-99) 134 mg/dL


(70-99) 


 142 mg/dL


(70-99)


 


White Blood Count


 


 


 10.4 x10^3/uL


(4.0-11.0) 





 


Red Blood Count


 


 


 2.69 x10^6/uL


(3.50-5.40) 





 


Hemoglobin


 


 


 7.8 g/dL


(12.0-15.5) 





 


Hematocrit


 


 


 24.3 %


(36.0-47.0) 





 


Mean Corpuscular Volume   91 fL ()  


 


Mean Corpuscular Hemoglobin   29 pg (25-35)  


 


Mean Corpuscular Hemoglobin


Concent 


 


 32 g/dL


(31-37) 





 


Red Cell Distribution Width


 


 


 17.5 %


(11.5-14.5) 





 


Platelet Count


 


 


 408 x10^3/uL


(140-400) 





 


Neutrophils (%) (Auto)   78 % (31-73)  


 


Lymphocytes (%) (Auto)   15 % (24-48)  


 


Monocytes (%) (Auto)   5 % (0-9)  


 


Eosinophils (%) (Auto)   2 % (0-3)  


 


Basophils (%) (Auto)   0 % (0-3)  


 


Neutrophils # (Auto)


 


 


 8.1 x10^3/uL


(1.8-7.7) 





 


Lymphocytes # (Auto)


 


 


 1.5 x10^3/uL


(1.0-4.8) 





 


Monocytes # (Auto)


 


 


 0.5 x10^3/uL


(0.0-1.1) 





 


Eosinophils # (Auto)


 


 


 0.2 x10^3/uL


(0.0-0.7) 





 


Basophils # (Auto)


 


 


 0.0 x10^3/uL


(0.0-0.2) 





 


Sodium Level


 


 


 149 mmol/L


(136-145) 





 


Potassium Level


 


 


 4.0 mmol/L


(3.5-5.1) 





 


Chloride Level


 


 


 112 mmol/L


() 





 


Carbon Dioxide Level


 


 


 31 mmol/L


(21-32) 





 


Anion Gap   6 (6-14)  


 


Blood Urea Nitrogen


 


 


 37 mg/dL


(7-20) 





 


Creatinine


 


 


 0.8 mg/dL


(0.6-1.0) 





 


Estimated GFR


(Cockcroft-Gault) 


 


 76.2 


 





 


BUN/Creatinine Ratio   46 (6-20)  


 


Glucose Level


 


 


 156 mg/dL


(70-99) 





 


Calcium Level


 


 


 8.9 mg/dL


(8.5-10.1) 





 


Total Bilirubin


 


 


 0.5 mg/dL


(0.2-1.0) 





 


Aspartate Amino Transf


(AST/SGOT) 


 


 42 U/L (15-37) 


 





 


Alanine Aminotransferase


(ALT/SGPT) 


 


 39 U/L (14-59) 


 





 


Alkaline Phosphatase


 


 


 91 U/L


() 





 


Total Protein


 


 


 5.8 g/dL


(6.4-8.2) 





 


Albumin


 


 


 1.7 g/dL


(3.4-5.0) 





 


Albumin/Globulin Ratio   0.4 (1.0-1.7)  


 


Triglycerides Level


 


 


 199 mg/dL


(0-150) 





 


Test


 5/8/20


12:36 5/8/20


18:13 5/9/20


00:18 5/9/20


06:00


 


Glucose (Fingerstick)


 151 mg/dL


(70-99) 150 mg/dL


(70-99) 159 mg/dL


(70-99) 139 mg/dL


(70-99)


 


Sodium Level


 


 


 


 149 mmol/L


(136-145)


 


Potassium Level


 


 


 


 3.9 mmol/L


(3.5-5.1)


 


Chloride Level


 


 


 


 111 mmol/L


()


 


Carbon Dioxide Level


 


 


 


 31 mmol/L


(21-32)


 


Anion Gap    7 (6-14) 


 


Blood Urea Nitrogen


 


 


 


 33 mg/dL


(7-20)


 


Creatinine


 


 


 


 0.8 mg/dL


(0.6-1.0)


 


Estimated GFR


(Cockcroft-Gault) 


 


 


 76.2 





 


Glucose Level


 


 


 


 145 mg/dL


(70-99)


 


Calcium Level


 


 


 


 8.7 mg/dL


(8.5-10.1)


 


Test


 5/9/20


13:34 


 


 





 


Glucose (Fingerstick)


 182 mg/dL


(70-99) 


 


 











Laboratory Tests








Test


 5/8/20


18:13 5/9/20


00:18 5/9/20


06:00 5/9/20


13:34


 


Glucose (Fingerstick)


 150 mg/dL


(70-99) 159 mg/dL


(70-99) 139 mg/dL


(70-99) 182 mg/dL


(70-99)


 


Sodium Level


 


 


 149 mmol/L


(136-145) 





 


Potassium Level


 


 


 3.9 mmol/L


(3.5-5.1) 





 


Chloride Level


 


 


 111 mmol/L


() 





 


Carbon Dioxide Level


 


 


 31 mmol/L


(21-32) 





 


Anion Gap   7 (6-14)  


 


Blood Urea Nitrogen


 


 


 33 mg/dL


(7-20) 





 


Creatinine


 


 


 0.8 mg/dL


(0.6-1.0) 





 


Estimated GFR


(Cockcroft-Gault) 


 


 76.2 


 





 


Glucose Level


 


 


 145 mg/dL


(70-99) 





 


Calcium Level


 


 


 8.7 mg/dL


(8.5-10.1) 











Microbiology


5/6/20 Aerobic and Anaerobic Culture, Resulted


         Pending


5/6/20 Anaerobic Culture Result 1 (ANSON), Resulted


         Pending


5/6/20 Aerobic Culture, Resulted


         Pending


5/6/20 Aerobic Culture Result 1 (ANSON), Resulted


         Pending


5/6/20 Gram Stain - Final, Resulted


         


5/6/20 Gram Stain Result 1 (ANSON) - Final, Resulted


         


5/6/20 Gram Stain Result 2 (ANSON) - Final, Resulted


         


5/4/20 Blood Culture - Final, Complete


         NO GROWTH AFTER 5 DAYS


4/30/20 Aerobic Culture - Final, Complete


          


4/30/20 Aerobic Culture Result 1 (ANSON) - Final, Complete


          


4/30/20 Gram Stain - Final, Complete


          


4/30/20 Gram Stain Result 1 (ANSON) - Final, Complete


          


4/30/20 Gram Stain Result 2 (ANSON) - Final, Complete


          


4/12/20 Urine Culture - Final, Complete


          


4/12/20 Urine Culture Result 1 (ANSON) - Final, Complete


Medications





Current Medications


Sodium Chloride 1,000 ml @  1,000 mls/hr Q1H IV  Last administered on 3/16/20at 

03:00;  Start 3/16/20 at 03:00;  Stop 3/16/20 at 03:59;  Status DC


Ondansetron HCl (Zofran) 4 mg 1X  ONCE IVP  Last administered on 3/16/20at 

03:27;  Start 3/16/20 at 03:00;  Stop 3/16/20 at 03:01;  Status DC


Morphine Sulfate (Morphine Sulfate) 4 mg 1X  ONCE IV ;  Start 3/16/20 at 03:00; 

Stop 3/16/20 at 03:01;  Status Cancel


Ketorolac Tromethamine (Toradol 30mg Vial) 30 mg 1X  ONCE IV  Last administered 

on 3/16/20at 02:54;  Start 3/16/20 at 03:00;  Stop 3/16/20 at 03:01;  Status DC


Fentanyl Citrate (Fentanyl 2ml Vial) 25 mcg 1X  ONCE IVP  Last administered on 

3/16/20at 03:23;  Start 3/16/20 at 03:30;  Stop 3/16/20 at 03:31;  Status DC


Fentanyl Citrate (Fentanyl 2ml Vial) 100 mcg STK-MED ONCE .ROUTE ;  Start 3/1

6/20 at 03:18;  Stop 3/16/20 at 03:18;  Status DC


Iohexol (Omnipaque 350 Mg/ml) 90 ml 1X  ONCE IV  Last administered on 3/16/20at 

03:25;  Start 3/16/20 at 03:30;  Stop 3/16/20 at 03:31;  Status DC


Info (CONTRAST GIVEN -- Rx MONITORING) 1 each PRN DAILY  PRN MC SEE COMMENTS;  

Start 3/16/20 at 03:30;  Stop 3/18/20 at 03:29;  Status DC


Hydromorphone HCl (Dilaudid) 0.5 mg 1X  ONCE IV  Last administered on 3/16/20at 

03:55;  Start 3/16/20 at 04:30;  Stop 3/16/20 at 04:32;  Status DC


Ondansetron HCl (Zofran) 4 mg PRN Q8HRS  PRN IV NAUSEA/VOMITING 1ST CHOICE;  

Start 3/16/20 at 05:00;  Stop 3/16/20 at 09:27;  Status DC


Morphine Sulfate (Morphine Sulfate) 2 mg PRN Q2HR  PRN IV SEVERE PAIN 7-10 Last 

administered on 3/17/20at 12:26;  Start 3/16/20 at 05:00;  Stop 3/17/20 at 

14:15;  Status DC


Sodium Chloride 1,000 ml @  125 mls/hr Q8H IV  Last administered on 3/16/20at 

20:56;  Start 3/16/20 at 05:00;  Stop 3/17/20 at 04:59;  Status DC


Hydromorphone HCl (Dilaudid) 0.5 mg PRN Q3HRS  PRN IV SEVERE PAIN 7-10 Last 

administered on 3/17/20at 10:06;  Start 3/16/20 at 05:00;  Stop 3/17/20 at 

12:01;  Status DC


Piperacillin Sod/ Tazobactam Sod 4.5 gm/Sodium Chloride 100 ml @  200 mls/hr 1X 

ONCE IV  Last administered on 3/16/20at 05:44;  Start 3/16/20 at 06:00;  Stop 

3/16/20 at 06:29;  Status DC


Ondansetron HCl (Zofran) 4 mg PRN Q4HRS  PRN IV NAUSEA/VOMITING 1ST CHOICE Last 

administered on 5/9/20at 07:55;  Start 3/16/20 at 09:30


Insulin Human Lispro (HumaLOG) 0-9 UNITS Q6HRS SQ  Last administered on 5/9/20at

13:36;  Start 3/16/20 at 09:30


Dextrose (Dextrose 50%-Water Syringe) 12.5 gm PRN Q15MIN  PRN IV SEE COMMENTS;  

Start 3/16/20 at 09:30


Pantoprazole Sodium (PROTONIX VIAL for IV PUSH) 40 mg DAILYAC IVP  Last 

administered on 5/9/20at 07:55;  Start 3/16/20 at 11:30


Prochlorperazine Edisylate (Compazine) 10 mg PRN Q6HRS  PRN IV NAUSEA/VOMITING, 

2nd CHOICE Last administered on 5/9/20at 13:25;  Start 3/16/20 at 17:45


Atenolol (Tenormin) 100 mg DAILY PO ;  Start 3/17/20 at 09:00;  Stop 3/16/20 at 

20:08;  Status DC


Metoprolol Tartrate (Lopressor Vial) 2.5 mg Q6HRS IVP  Last administered on 

3/17/20at 05:51;  Start 3/16/20 at 20:15;  Stop 3/17/20 at 10:02;  Status DC


Metoprolol Tartrate (Lopressor Vial) 5 mg Q6HRS IVP  Last administered on 

3/26/20at 00:12;  Start 3/17/20 at 10:15;  Stop 3/28/20 at 08:48;  Status DC


Hydromorphone HCl (Dilaudid) 1 mg PRN Q3HRS  PRN IV SEVERE PAIN 7-10 Last 

administered on 3/23/20at 05:13;  Start 3/17/20 at 12:00;  Stop 3/31/20 at 

00:25;  Status DC


Lidocaine HCl (Buffered Lidocaine 1%) 3 ml STK-MED ONCE .ROUTE ;  Start 3/17/20 

at 12:55;  Stop 3/17/20 at 12:56;  Status DC


Albumin Human 500 ml @  125 mls/hr 1X  ONCE IV  Last administered on 3/17/20at 

14:33;  Start 3/17/20 at 14:30;  Stop 3/17/20 at 18:32;  Status DC


Norepinephrine Bitartrate 8 mg/ Dextrose 258 ml @  17.299 mls/ hr CONT  PRN IV 

PER PROTOCOL Last administered on 4/14/20at 12:48;  Start 3/17/20 at 15:30;  

Stop 4/17/20 at 09:19;  Status DC


Sodium Chloride 1,000 ml @  125 mls/hr Q8H IV  Last administered on 3/17/20at 

21:04;  Start 3/17/20 at 16:00;  Stop 3/18/20 at 02:42;  Status DC


Albumin Human 500 ml @  125 mls/hr PRN BID  PRN IV After every 2L NSS & BP < 

90mm Last administered on 4/1/20at 14:21;  Start 3/17/20 at 16:00


Iohexol (Omnipaque 300 Mg/ml) 60 ml 1X  ONCE IV  Last administered on 3/17/20at 

17:20;  Start 3/17/20 at 17:00;  Stop 3/17/20 at 17:01;  Status DC


Info (CONTRAST GIVEN -- Rx MONITORING) 1 each PRN DAILY  PRN MC SEE COMMENTS;  

Start 3/17/20 at 17:00;  Stop 3/19/20 at 16:59;  Status DC


Meropenem 1 gm/ Sodium Chloride 100 ml @  200 mls/hr Q8HRS IV  Last administered

on 3/18/20at 05:45;  Start 3/17/20 at 20:00;  Stop 3/18/20 at 08:48;  Status DC


Furosemide (Lasix) 40 mg 1X  ONCE IVP  Last administered on 3/17/20at 22:12;  

Start 3/17/20 at 22:30;  Stop 3/17/20 at 22:31;  Status DC


Calcium Chloride 1000 mg/Sodium Chloride 110 ml @  220 mls/hr 1X  ONCE IV  Last 

administered on 3/17/20at 22:11;  Start 3/17/20 at 22:30;  Stop 3/17/20 at 

22:59;  Status DC


Albuterol Sulfate (Ventolin Neb Soln) 2.5 mg 1X  ONCE NEB  Last administered on 

3/18/20at 00:56;  Start 3/17/20 at 22:30;  Stop 3/17/20 at 22:31;  Status DC


Insulin Human Regular (HumuLIN R VIAL) 5 unit 1X  ONCE IV  Last administered on 

3/17/20at 22:14;  Start 3/17/20 at 22:30;  Stop 3/17/20 at 22:31;  Status DC


Magnesium Sulfate 50 ml @ 25 mls/hr 1X  ONCE IV  Last administered on 3/18/20at 

02:57;  Start 3/18/20 at 03:00;  Stop 3/18/20 at 04:59;  Status DC


Calcium Gluconate 1000 mg/Sodium Chloride 110 ml @  220 mls/hr 1X  ONCE IV  Last

administered on 3/18/20at 02:46;  Start 3/18/20 at 03:00;  Stop 3/18/20 at 

03:29;  Status DC


Sodium Chloride 1,000 ml @  200 mls/hr Q5H IV  Last administered on 3/18/20at 

02:46;  Start 3/18/20 at 03:00;  Stop 3/18/20 at 10:21;  Status DC


Calcium Gluconate 1000 mg/Sodium Chloride 110 ml @  220 mls/hr 1X  ONCE IV  Last

administered on 3/18/20at 03:21;  Start 3/18/20 at 03:30;  Stop 3/18/20 at 

03:59;  Status DC


Sodium Bicarbonate 50 meq/Sodium Chloride 1,050 ml @  75 mls/hr Q14H IV  Last 

administered on 3/22/20at 21:10;  Start 3/18/20 at 07:30;  Stop 3/23/20 at 

10:28;  Status DC


Calcium Gluconate 2000 mg/Sodium Chloride 120 ml @  220 mls/hr 1X  ONCE IV  Last

administered on 3/18/20at 09:05;  Start 3/18/20 at 07:30;  Stop 3/18/20 at 

08:02;  Status DC


Lidocaine HCl (Xylocaine-Mpf 1% 2ml Vial) 2 ml STK-MED ONCE .ROUTE ;  Start 3/1

8/20 at 08:47;  Stop 3/18/20 at 08:47;  Status DC


Meropenem 500 mg/ Sodium Chloride 50 ml @  100 mls/hr Q12HR IV  Last administe

red on 3/23/20at 21:01;  Start 3/18/20 at 18:00;  Stop 3/24/20 at 07:58;  Status

DC


Lidocaine HCl (Buffered Lidocaine 1%) 3 ml STK-MED ONCE .ROUTE ;  Start 3/18/20 

at 09:46;  Stop 3/18/20 at 09:46;  Status DC


Lidocaine HCl (Buffered Lidocaine 1%) 6 ml 1X  ONCE INJ  Last administered on 

3/18/20at 10:26;  Start 3/18/20 at 10:15;  Stop 3/18/20 at 10:16;  Status DC


Info (Tpn Per Pharmacy) 1 each PRN DAILY  PRN MC SEE COMMENTS Last administered 

on 5/9/20at 13:28;  Start 3/18/20 at 12:00


Sodium Chloride 1,000 ml @  1,000 mls/hr Q1H PRN IV hypotension;  Start 3/18/20 

at 12:07;  Stop 3/18/20 at 18:06;  Status DC


Diphenhydramine HCl (Benadryl) 25 mg 1X PRN  PRN IV ITCHING;  Start 3/18/20 at 

12:15;  Stop 3/19/20 at 12:14;  Status DC


Diphenhydramine HCl (Benadryl) 25 mg 1X PRN  PRN IV ITCHING;  Start 3/18/20 at 

12:15;  Stop 3/19/20 at 12:14;  Status DC


Sodium Chloride 1,000 ml @  400 mls/hr Q2H30M PRN IV PATENCY;  Start 3/18/20 at 

12:07;  Stop 3/19/20 at 00:06;  Status DC


Info (PHARMACY MONITORING -- do not chart) 1 each PRN DAILY  PRN MC SEE 

COMMENTS;  Start 3/18/20 at 12:15;  Stop 3/20/20 at 08:13;  Status DC


Sodium Chloride 90 meq/Calcium Gluconate 10 meq/ Multivitamins 10 ml/Chromium/ 

Copper/Manganese/ Seleni/Zn 1 ml/ Total Parenteral Nutrition/Amino 

Acids/Dextrose/ Fat Emulsion Intravenous 55.005 ml  @ 2.292 mls/hr TPN  CONT IV 

;  Start 3/18/20 at 22:00;  Stop 3/18/20 at 12:33;  Status DC


Info (Tpn Per Pharmacy) 1 each PRN DAILY  PRN MC SEE COMMENTS;  Start 3/18/20 at

12:30;  Status UNV


Sodium Chloride 90 meq/Calcium Gluconate 10 meq/ Multivitamins 10 ml/Chromium/ 

Copper/Manganese/ Seleni/Zn 0.5 ml/ Total Parenteral Nutrition/Amino Acids/Dext

verenice/ Fat Emulsion Intravenous 1,512 ml @  63 mls/hr TPN  CONT IV  Last 

administered on 3/18/20at 22:06;  Start 3/18/20 at 22:00;  Stop 3/19/20 at 

21:59;  Status DC


Calcium Carbonate/ Glycine (Tums) 500 mg PRN AFTMEALHC  PRN PO INDIGESTION;  

Start 3/18/20 at 17:45


Calcium Gluconate (Calcium Gluconate) 2,000 mg 1X  ONCE IVP  Last administered 

on 3/19/20at 02:19;  Start 3/19/20 at 02:15;  Stop 3/19/20 at 02:16;  Status DC


Calcium Chloride 3000 mg/Sodium Chloride 1,030 ml @  50 mls/hr [id number] IV  Last 

administered on 3/21/20at 02:17;  Start 3/19/20 at 08:00;  Stop 3/21/20 at 

15:23;  Status DC


Lorazepam (Ativan Inj) 1 mg PRN Q4HRS  PRN IVP ANXIETY / AGITATION, 2nd choic 

Last administered on 4/17/20at 03:51;  Start 3/19/20 at 09:00;  Stop 4/17/20 at 

09:19;  Status DC


Sodium Chloride 1,000 ml @  1,000 mls/hr Q1H PRN IV hypotension;  Start 3/19/20 

at 08:56;  Stop 3/19/20 at 14:55;  Status DC


Albumin Human 200 ml @  200 mls/hr 1X PRN  PRN IV Hypotension;  Start 3/19/20 at

09:00;  Stop 3/19/20 at 14:59;  Status DC


Diphenhydramine HCl (Benadryl) 25 mg 1X PRN  PRN IV ITCHING;  Start 3/19/20 at 

09:00;  Stop 3/20/20 at 08:59;  Status DC


Diphenhydramine HCl (Benadryl) 25 mg 1X PRN  PRN IV ITCHING;  Start 3/19/20 at 

09:00;  Stop 3/20/20 at 08:59;  Status DC


Sodium Chloride 1,000 ml @  400 mls/hr Q2H30M PRN IV PATENCY;  Start 3/19/20 at 

08:56;  Stop 3/19/20 at 20:55;  Status DC


Info (PHARMACY MONITORING -- do not chart) 1 each PRN DAILY  PRN MC SEE 

COMMENTS;  Start 3/19/20 at 09:00;  Status UNV


Info (PHARMACY MONITORING -- do not chart) 1 each PRN DAILY  PRN MC SEE 

COMMENTS;  Start 3/19/20 at 09:00;  Stop 3/20/20 at 08:13;  Status DC


Digoxin (Lanoxin) 500 mcg 1X  ONCE IV  Last administered on 3/19/20at 10:04;  

Start 3/19/20 at 10:00;  Stop 3/19/20 at 10:01;  Status DC


Digoxin (Lanoxin) 125 mcg 1X  ONCE IV  Last administered on 3/19/20at 17:10;  

Start 3/19/20 at 18:00;  Stop 3/19/20 at 18:01;  Status DC


Magnesium Sulfate 100 ml @  25 mls/hr 1X  ONCE IV  Last administered on 

3/19/20at 12:48;  Start 3/19/20 at 13:00;  Stop 3/19/20 at 16:59;  Status DC


Sodium Chloride 90 meq/Magnesium Sulfate 10 meq/ Calcium Gluconate 20 meq/ 

Multivitamins 10 ml/Chromium/ Copper/Manganese/ Seleni/Zn 0.5 ml/ Total 

Parenteral Nutrition/Amino Acids/Dextrose/ Fat Emulsion Intravenous 1,512 ml @  

63 mls/hr TPN  CONT IV  Last administered on 3/19/20at 22:25;  Start 3/19/20 at 

22:00;  Stop 3/20/20 at 21:59;  Status DC


Sodium Chloride 1,000 ml @  1,000 mls/hr Q1H PRN IV hypotension;  Start 3/20/20 

at 08:05;  Stop 3/20/20 at 14:04;  Status DC


Albumin Human 200 ml @  200 mls/hr 1X  ONCE IV  Last administered on 3/20/20at 

08:57;  Start 3/20/20 at 08:15;  Stop 3/20/20 at 09:14;  Status DC


Diphenhydramine HCl (Benadryl) 25 mg 1X PRN  PRN IV ITCHING;  Start 3/20/20 at 

08:15;  Stop 3/21/20 at 08:14;  Status DC


Diphenhydramine HCl (Benadryl) 25 mg 1X PRN  PRN IV ITCHING;  Start 3/20/20 at 

08:15;  Stop 3/21/20 at 08:14;  Status DC


Sodium Chloride 1,000 ml @  400 mls/hr Q2H30M PRN IV PATENCY;  Start 3/20/20 at 

08:05;  Stop 3/20/20 at 20:04;  Status DC


Info (PHARMACY MONITORING -- do not chart) 1 each PRN DAILY  PRN MC SEE 

COMMENTS;  Start 3/20/20 at 08:15;  Stop 3/24/20 at 07:57;  Status DC


Sodium Chloride 90 meq/Potassium Chloride 15 meq/ Potassium Phosphate 10 mmol/ 

Magnesium Sulfate 10 meq/Calcium Gluconate 20 meq/ Multivitamins 10 ml/Chromium/

Copper/Manganese/ Seleni/Zn 0.5 ml/ Total Parenteral Nutrition/Amino 

Acids/Dextrose/ Fat Emulsion Intravenous 1,512 ml @  63 mls/hr TPN  CONT IV  

Last administered on 3/20/20at 21:01;  Start 3/20/20 at 22:00;  Stop 3/21/20 at 

21:59;  Status DC


Potassium Chloride/Water 100 ml @  100 mls/hr 1X  ONCE IV  Last administered on 

3/20/20at 14:09;  Start 3/20/20 at 14:00;  Stop 3/20/20 at 14:59;  Status DC


Benzocaine (Hurricaine One) 1 spray 1X  ONCE MM  Last administered on 3/20/20at 

16:38;  Start 3/20/20 at 14:30;  Stop 3/20/20 at 14:31;  Status DC


Lidocaine HCl (Glydo (Lidocaine) Jelly) 1 thomas 1X  ONCE MM  Last administered on 

3/20/20at 16:38;  Start 3/20/20 at 14:30;  Stop 3/20/20 at 14:31;  Status DC


Linezolid/Dextrose 300 ml @  300 mls/hr Q12HR IV  Last administered on 3/26/20at

21:04;  Start 3/20/20 at 20:00;  Stop 3/27/20 at 07:50;  Status DC


Acetaminophen (Tylenol) 650 mg PRN Q6HRS  PRN PO MILD PAIN / TEMP;  Start 

3/21/20 at 03:30;  Stop 3/21/20 at 03:36;  Status DC


Acetaminophen (Tylenol) 650 mg PRN Q6HRS  PRN PEG MILD PAIN / TEMP Last 

administered on 4/16/20at 19:56;  Start 3/21/20 at 03:36


Sodium Chloride 1,000 ml @  1,000 mls/hr Q1H PRN IV hypotension;  Start 3/21/20 

at 07:50;  Stop 3/21/20 at 13:49;  Status DC


Albumin Human 200 ml @  200 mls/hr 1X PRN  PRN IV Hypotension;  Start 3/21/20 at

08:00;  Stop 3/21/20 at 13:59;  Status DC


Sodium Chloride (Normal Saline Flush) 10 ml 1X PRN  PRN IV AP catheter pack;  

Start 3/21/20 at 08:00;  Stop 3/22/20 at 07:59;  Status DC


Sodium Chloride (Normal Saline Flush) 10 ml 1X PRN  PRN IV  catheter pack;  

Start 3/21/20 at 08:00;  Stop 3/22/20 at 07:59;  Status DC


Sodium Chloride 1,000 ml @  400 mls/hr Q2H30M PRN IV PATENCY;  Start 3/21/20 at 

07:50;  Stop 3/21/20 at 19:49;  Status DC


Info (PHARMACY MONITORING -- do not chart) 1 each PRN DAILY  PRN MC SEE 

COMMENTS;  Start 3/21/20 at 08:00;  Status UNV


Info (PHARMACY MONITORING -- do not chart) 1 each PRN DAILY  PRN MC SEE 

COMMENTS;  Start 3/21/20 at 08:00;  Stop 3/23/20 at 08:25;  Status DC


Sodium Chloride 90 meq/Potassium Chloride 15 meq/ Potassium Phosphate 10 mmol/ 

Magnesium Sulfate 10 meq/Calcium Gluconate 20 meq/ Multivitamins 10 ml/Chromium/

Copper/Manganese/ Seleni/Zn 0.5 ml/ Total Parenteral Nutrition/Amino 

Acids/Dextrose/ Fat Emulsion Intravenous 1,512 ml @  63 mls/hr TPN  CONT IV  

Last administered on 3/21/20at 20:57;  Start 3/21/20 at 22:00;  Stop 3/22/20 at 

21:59;  Status DC


Sodium Chloride 90 meq/Potassium Chloride 15 meq/ Potassium Phosphate 15 mmol/ 

Magnesium Sulfate 10 meq/Calcium Gluconate 20 meq/ Multivitamins 10 ml/Chromium/

Copper/Manganese/ Seleni/Zn 0.5 ml/ Total Parenteral Nutrition/Amino 

Acids/Dextrose/ Fat Emulsion Intravenous 1,512 ml @  63 mls/hr TPN  CONT IV ;  

Start 3/22/20 at 22:00;  Stop 3/22/20 at 14:16;  Status DC


Sodium Chloride 90 meq/Potassium Chloride 15 meq/ Potassium Phosphate 15 mmol/ 

Magnesium Sulfate 10 meq/Calcium Gluconate 20 meq/ Multivitamins 10 ml/Chromium/

Copper/Manganese/ Seleni/Zn 0.5 ml/ Total Parenteral Nutrition/Amino 

Acids/Dextrose/ Fat Emulsion Intravenous 1,200 ml @  50 mls/hr TPN  CONT IV ;  

Start 3/22/20 at 22:00;  Stop 3/22/20 at 14:17;  Status DC


Sodium Chloride 90 meq/Potassium Chloride 15 meq/ Potassium Phosphate 10 mmol/ 

Magnesium Sulfate 10 meq/Calcium Gluconate 20 meq/ Multivitamins 10 ml/Chromium/

Copper/Manganese/ Seleni/Zn 0.5 ml/ Total Parenteral Nutrition/Amino 

Acids/Dextrose/ Fat Emulsion Intravenous 1,200 ml @  50 mls/hr TPN  CONT IV  

Last administered on 3/22/20at 23:29;  Start 3/22/20 at 22:00;  Stop 3/23/20 at 

21:59;  Status DC


Sodium Chloride 1,000 ml @  1,000 mls/hr Q1H PRN IV hypotension;  Start 3/23/20 

at 07:28;  Stop 3/23/20 at 13:27;  Status DC


Albumin Human 200 ml @  200 mls/hr 1X  ONCE IV  Last administered on 3/23/20at 

08:51;  Start 3/23/20 at 07:30;  Stop 3/23/20 at 08:29;  Status DC


Diphenhydramine HCl (Benadryl) 25 mg 1X PRN  PRN IV ITCHING;  Start 3/23/20 at 

07:30;  Stop 3/24/20 at 07:29;  Status DC


Diphenhydramine HCl (Benadryl) 25 mg 1X PRN  PRN IV ITCHING;  Start 3/23/20 at 

07:30;  Stop 3/24/20 at 07:29;  Status DC


Sodium Chloride 1,000 ml @  400 mls/hr Q2H30M PRN IV PATENCY;  Start 3/23/20 at 

07:28;  Stop 3/23/20 at 19:27;  Status DC


Info (PHARMACY MONITORING -- do not chart) 1 each PRN DAILY  PRN MC SEE 

COMMENTS;  Start 3/23/20 at 07:30;  Stop 4/3/20 at 13:01;  Status DC


Metronidazole 100 ml @  100 mls/hr Q6HRS IV  Last administered on 4/8/20at 

06:26;  Start 3/23/20 at 08:30;  Stop 4/8/20 at 09:58;  Status DC


Micafungin Sodium 100 mg/Dextrose 100 ml @  100 mls/hr Q24H IV  Last 

administered on 4/30/20at 08:18;  Start 3/23/20 at 09:00;  Stop 4/30/20 at 

20:58;  Status DC


Propofol 0 ml @ As Directed STK-MED ONCE IV ;  Start 3/23/20 at 07:53;  Stop 

3/23/20 at 07:53;  Status DC


Etomidate (Amidate) 20 mg STK-MED ONCE IV ;  Start 3/23/20 at 07:53;  Stop 

3/23/20 at 07:54;  Status DC


Midazolam HCl (Versed) 5 mg STK-MED ONCE .ROUTE ;  Start 3/23/20 at 07:57;  Stop

3/23/20 at 07:57;  Status DC


Fentanyl Citrate 30 ml @ 0 mls/hr CONT  PRN IV SEE PROTOCOL Last administered on

4/17/20at 06:12;  Start 3/23/20 at 08:15;  Stop 4/17/20 at 09:19;  Status DC


Artificial Tears (Artificial Tears) 1 drop PRN Q1HR  PRN OU DRY EYE, 1st choice;

 Start 3/23/20 at 08:15;  Stop 4/29/20 at 05:31;  Status DC


Midazolam HCl 50 mg/Sodium Chloride 50 ml @ 0 mls/hr CONT  PRN IV SEE PROTOCOL 

Last administered on 3/26/20at 22:39;  Start 3/23/20 at 08:15;  Stop 3/28/20 at 

15:59;  Status DC


Etomidate (Amidate) 8 mg 1X  ONCE IV  Last administered on 3/23/20at 08:33;  

Start 3/23/20 at 08:30;  Stop 3/23/20 at 08:31;  Status DC


Succinylcholine Chloride (Anectine) 120 mg 1X  ONCE IV  Last administered on 

3/23/20at 08:34;  Start 3/23/20 at 08:30;  Stop 3/23/20 at 08:31;  Status DC


Midazolam HCl (Versed) 5 mg 1X  ONCE IV ;  Start 3/23/20 at 08:30;  Stop 3/23/20

at 08:31;  Status DC


Potassium Chloride 15 meq/ Bicarbonate Dialysis Soln w/ out KCl 5,007.5 ml  @ 

1,000 mls/ hr Q5H1M IV  Last administered on 3/24/20at 11:11;  Start 3/23/20 at 

12:00;  Stop 3/24/20 at 11:15;  Status DC


Potassium Chloride 15 meq/ Bicarbonate Dialysis Soln w/ out KCl 5,007.5 ml  @ 

1,000 mls/ hr Q5H1M IV  Last administered on 3/24/20at 11:12;  Start 3/23/20 at 

12:00;  Stop 3/24/20 at 11:17;  Status DC


Potassium Chloride 15 meq/ Bicarbonate Dialysis Soln w/ out KCl 5,007.5 ml  @ 

1,000 mls/ hr Q5H1M IV  Last administered on 3/24/20at 11:11;  Start 3/23/20 at 

12:00;  Stop 3/24/20 at 11:19;  Status DC


Sodium Chloride 90 meq/Potassium Chloride 15 meq/ Potassium Phosphate 10 mmol/ 

Magnesium Sulfate 10 meq/Calcium Gluconate 20 meq/ Multivitamins 10 ml/Chromium/

Copper/Manganese/ Seleni/Zn 0.5 ml/ Total Parenteral Nutrition/Amino 

Acids/Dextrose/ Fat Emulsion Intravenous 1,400 ml @  58.333 mls/ hr TPN  CONT IV

 Last administered on 3/23/20at 21:42;  Start 3/23/20 at 22:00;  Stop 3/24/20 at

21:59;  Status DC


Heparin Sodium (Porcine) (Heparin Sodium) 5,000 unit Q8HRS SQ  Last administered

on 3/28/20at 05:55;  Start 3/23/20 at 15:00;  Stop 3/28/20 at 13:28;  Status DC


Meropenem 500 mg/ Sodium Chloride 50 ml @  100 mls/hr Q6HRS IV  Last 

administered on 3/25/20at 06:00;  Start 3/24/20 at 09:00;  Stop 3/25/20 at 07:2

9;  Status DC


Potassium Phosphate 20 mmol/ Sodium Chloride 106.6667 ml @  51.667 m... 1X  ONCE

IV  Last administered on 3/24/20at 11:22;  Start 3/24/20 at 10:15;  Stop 3/24/20

at 12:18;  Status DC


Acetaminophen (Tylenol Supp) 650 mg PRN Q6HRS  PRN ID MILD PAIN / TEMP > 100.3'F

Last administered on 5/5/20at 09:12;  Start 3/24/20 at 10:30


Potassium Chloride/Water 100 ml @  100 mls/hr Q1H IV  Last administered on 

3/24/20at 12:12;  Start 3/24/20 at 11:00;  Stop 3/24/20 at 12:59;  Status DC


Potassium Chloride 20 meq/ Bicarbonate Dialysis Soln w/ out KCl 5,010 ml @  

1,000 mls/hr Q5H1M IV  Last administered on 3/25/20at 08:48;  Start 3/24/20 at 

12:00;  Stop 3/25/20 at 13:03;  Status DC


Potassium Chloride 20 meq/ Bicarbonate Dialysis Soln w/ out KCl 5,010 ml @  1,00

0 mls/hr Q5H1M IV  Last administered on 3/29/20at 14:52;  Start 3/24/20 at 

11:30;  Stop 3/29/20 at 19:59;  Status DC


Potassium Chloride 20 meq/ Bicarbonate Dialysis Soln w/ out KCl 5,010 ml @  

1,000 mls/hr Q5H1M IV  Last administered on 3/29/20at 14:53;  Start 3/24/20 at 

11:30;  Stop 3/29/20 at 19:59;  Status DC


Sodium Chloride 90 meq/Potassium Chloride 15 meq/ Potassium Phosphate 15 mmol/ 

Magnesium Sulfate 10 meq/Calcium Gluconate 15 meq/ Multivitamins 10 ml/Chromium/

Copper/Manganese/ Seleni/Zn 0.5 ml/ Total Parenteral Nutrition/Amino 

Acids/Dextrose/ Fat Emulsion Intravenous 1,400 ml @  58.333 mls/ hr TPN  CONT IV

 Last administered on 3/24/20at 22:17;  Start 3/24/20 at 22:00;  Stop 3/25/20 at

21:59;  Status DC


Cefepime HCl (Maxipime) 2 gm Q12HR IVP  Last administered on 4/7/20at 20:56;  

Start 3/25/20 at 09:00;  Stop 4/8/20 at 09:58;  Status DC


Daptomycin 500 mg/ Sodium Chloride 50 ml @  100 mls/hr Q48H IV  Last 

administered on 4/10/20at 09:57;  Start 3/25/20 at 08:30;  Stop 4/10/20 at 

10:07;  Status DC


Lidocaine HCl (Buffered Lidocaine 1%) 3 ml 1X  ONCE INJ  Last administered on 

3/25/20at 10:27;  Start 3/25/20 at 10:30;  Stop 3/25/20 at 10:31;  Status DC


Potassium Phosphate 20 mmol/ Sodium Chloride 106.6667 ml @  51.667 m... 1X  ONCE

IV  Last administered on 3/25/20at 12:51;  Start 3/25/20 at 13:00;  Stop 3/25/20

at 15:03;  Status DC


Sodium Chloride 90 meq/Potassium Chloride 15 meq/ Potassium Phosphate 18 mmol/ 

Magnesium Sulfate 8 meq/Calcium Gluconate 15 meq/ Multivitamins 10 ml/Chromium/ 

Copper/Manganese/ Seleni/Zn 0.5 ml/ Total Parenteral Nutrition/Amino 

Acids/Dextrose/ Fat Emulsion Intravenous 1,400 ml @  58.333 mls/ hr TPN  CONT IV

 Last administered on 3/25/20at 22:16;  Start 3/25/20 at 22:00;  Stop 3/26/20 at

21:59;  Status DC


Potassium Chloride 20 meq/ Bicarbonate Dialysis Soln w/ out KCl 5,010 ml @  

1,000 mls/hr Q5H1M IV  Last administered on 3/29/20at 14:54;  Start 3/25/20 at 

16:00;  Stop 3/29/20 at 19:59;  Status DC


Multi-Ingred Cream/Lotion/Oil/ Oint (Artificial Tears Eye Ointment) 1 thomas PRN 

Q1HR  PRN OU DRY EYE, 2nd choice Last administered on 4/13/20at 08:19;  Start 

3/25/20 at 17:30


Sodium Chloride 90 meq/Potassium Chloride 15 meq/ Potassium Phosphate 18 mmol/ 

Magnesium Sulfate 8 meq/Calcium Gluconate 15 meq/ Multivitamins 10 ml/Chromium/ 

Copper/Manganese/ Seleni/Zn 0.5 ml/ Total Parenteral Nutrition/Amino 

Acids/Dextrose/ Fat Emulsion Intravenous 1,400 ml @  58.333 mls/ hr TPN  CONT IV

 Last administered on 3/26/20at 22:00;  Start 3/26/20 at 22:00;  Stop 3/27/20 at

21:59;  Status DC


Albumin Human 500 ml @  125 mls/hr 1X  ONCE IV ;  Start 3/26/20 at 14:15;  Stop 

3/26/20 at 18:14;  Status DC


Sodium Chloride 90 meq/Potassium Chloride 15 meq/ Potassium Phosphate 18 mmol/ 

Magnesium Sulfate 8 meq/Calcium Gluconate 15 meq/ Multivitamins 10 ml/Chromium/ 

Copper/Manganese/ Seleni/Zn 0.5 ml/ Insulin Human Regular 10 unit/ Total 

Parenteral Nutrition/Amino Acids/Dextrose/ Fat Emulsion Intravenous 1,400 ml @  

58.333 mls/ hr TPN  CONT IV  Last administered on 3/27/20at 21:43;  Start 

3/27/20 at 22:00;  Stop 3/28/20 at 21:59;  Status DC


Lidocaine HCl (Buffered Lidocaine 1%) 3 ml STK-MED ONCE .ROUTE ;  Start 3/25/20 

at 10:00;  Stop 3/27/20 at 13:57;  Status DC


Midazolam HCl 100 mg/Sodium Chloride 100 ml @ 7 mls/hr CONT  PRN IV SEE PROTOCOL

Last administered on 4/8/20at 15:35;  Start 3/28/20 at 16:00


Sodium Chloride 90 meq/Potassium Chloride 15 meq/ Potassium Phosphate 18 mmol/ 

Magnesium Sulfate 8 meq/Calcium Gluconate 15 meq/ Multivitamins 10 ml/Chromium/ 

Copper/Manganese/ Seleni/Zn 0.5 ml/ Insulin Human Regular 15 unit/ Total 

Parenteral Nutrition/Amino Acids/Dextrose/ Fat Emulsion Intravenous 1,400 ml @  

58.333 mls/ hr TPN  CONT IV  Last administered on 3/28/20at 20:34;  Start 

3/28/20 at 22:00;  Stop 3/29/20 at 21:59;  Status DC


Info ([facility]cu Electrolyte Protocol) 1 ea CONT PRN  PRN MC PER PROTOCOL;  Start 

3/29/20 at 13:15


Sodium Chloride 90 meq/Potassium Chloride 15 meq/ Potassium Phosphate 18 mmol/ 

Magnesium Sulfate 8 meq/Calcium Gluconate 15 meq/ Multivitamins 10 ml/Chromium/ 

Copper/Manganese/ Seleni/Zn 0.5 ml/ Insulin Human Regular 15 unit/ Total 

Parenteral Nutrition/Amino Acids/Dextrose/ Fat Emulsion Intravenous 1,400 ml @  

58.333 mls/ hr TPN  CONT IV  Last administered on 3/29/20at 22:05;  Start 

3/29/20 at 22:00;  Stop 3/30/20 at 21:59;  Status DC


Potassium Chloride 15 meq/ Bicarbonate Dialysis Soln w/ out KCl 5,007.5 ml  @ 

1,000 mls/ hr Q5H1M IV  Last administered on 4/1/20at 18:14;  Start 3/29/20 at 

20:00;  Stop 4/2/20 at 13:08;  Status DC


Potassium Chloride 15 meq/ Bicarbonate Dialysis Soln w/ out KCl 5,007.5 ml  @ 1,

000 mls/ hr Q5H1M IV  Last administered on 4/1/20at 18:14;  Start 3/29/20 at 

20:00;  Stop 4/2/20 at 13:08;  Status DC


Potassium Chloride 15 meq/ Bicarbonate Dialysis Soln w/ out KCl 5,007.5 ml  @ 

1,000 mls/ hr Q5H1M IV  Last administered on 4/1/20at 18:14;  Start 3/29/20 at 

20:00;  Stop 4/2/20 at 13:08;  Status DC


Iohexol (Omnipaque 240 Mg/ml) 30 ml 1X  ONCE PO  Last administered on 3/30/20at 

11:30;  Start 3/30/20 at 11:30;  Stop 3/30/20 at 11:33;  Status DC


Info (CONTRAST GIVEN -- Rx MONITORING) 1 each PRN DAILY  PRN MC SEE COMMENTS;  

Start 3/30/20 at 11:45;  Stop 4/1/20 at 11:44;  Status DC


Sodium Chloride 90 meq/Potassium Chloride 15 meq/ Potassium Phosphate 18 mmol/ 

Magnesium Sulfate 8 meq/Calcium Gluconate 15 meq/ Multivitamins 10 ml/Chromium/ 

Copper/Manganese/ Seleni/Zn 0.5 ml/ Insulin Human Regular 15 unit/ Total 

Parenteral Nutrition/Amino Acids/Dextrose/ Fat Emulsion Intravenous 1,400 ml @  

58.333 mls/ hr TPN  CONT IV  Last administered on 3/30/20at 21:47;  Start 

3/30/20 at 22:00;  Stop 3/31/20 at 21:59;  Status DC


Sodium Chloride 90 meq/Potassium Chloride 15 meq/ Potassium Phosphate 18 mmol/ 

Magnesium Sulfate 8 meq/Calcium Gluconate 15 meq/ Multivitamins 10 ml/Chromium/ 

Copper/Manganese/ Seleni/Zn 0.5 ml/ Insulin Human Regular 20 unit/ Total 

Parenteral Nutrition/Amino Acids/Dextrose/ Fat Emulsion Intravenous 1,400 ml @  

58.333 mls/ hr TPN  CONT IV  Last administered on 3/31/20at 21:36;  Start 

3/31/20 at 22:00;  Stop 4/1/20 at 21:59;  Status DC


Alteplase, Recombinant (Cathflo For Central Catheter Clearance) 1 mg 1X  ONCE 

INT CAT  Last administered on 3/31/20at 20:03;  Start 3/31/20 at 19:30;  Stop 

3/31/20 at 19:46;  Status DC


Alteplase, Recombinant (Cathflo For Central Catheter Clearance) 1 mg 1X  ONCE 

INT CAT  Last administered on 3/31/20at 22:05;  Start 3/31/20 at 22:00;  Stop 

3/31/20 at 22:01;  Status DC


Sodium Chloride 90 meq/Potassium Chloride 15 meq/ Potassium Phosphate 18 mmol/ 

Magnesium Sulfate 8 meq/Calcium Gluconate 15 meq/ Multivitamins 10 ml/Chromium/ 

Copper/Manganese/ Seleni/Zn 0.5 ml/ Insulin Human Regular 20 unit/ Total 

Parenteral Nutrition/Amino Acids/Dextrose/ Fat Emulsion Intravenous 1,400 ml @  

58.333 mls/ hr TPN  CONT IV  Last administered on 4/1/20at 21:30;  Start 4/1/20 

at 22:00;  Stop 4/2/20 at 21:59;  Status DC


Dexmedetomidine HCl 400 mcg/ Sodium Chloride 100 ml @ 0 mls/hr CONT  PRN IV 

ANXIETY / AGITATION Last administered on 5/9/20at 05:45;  Start 4/2/20 at 08:15


Sodium Chloride 500 ml @  500 mls/hr 1X PRN  PRN IV ELEVATED BP, SEE COMMENTS;  

Start 4/2/20 at 08:15


Atropine Sulfate (ATROPINE 0.5mg SYRINGE) 0.5 mg PRN Q5MIN  PRN IV SEE COMMENTS;

 Start 4/2/20 at 08:15


Furosemide (Lasix) 20 mg 1X  ONCE IVP  Last administered on 4/2/20at 08:19;  

Start 4/2/20 at 08:15;  Stop 4/2/20 at 08:16;  Status DC


Lidocaine HCl (Buffered Lidocaine 1%) 3 ml STK-MED ONCE .ROUTE ;  Start 4/2/20 

at 08:39;  Stop 4/2/20 at 08:39;  Status DC


Lidocaine HCl (Buffered Lidocaine 1%) 6 ml 1X  ONCE INJ  Last administered on 

4/2/20at 09:05;  Start 4/2/20 at 09:00;  Stop 4/2/20 at 09:06;  Status DC


Sodium Chloride 90 meq/Potassium Chloride 15 meq/ Potassium Phosphate 18 mmol/ 

Magnesium Sulfate 8 meq/Calcium Gluconate 15 meq/ Multivitamins 10 ml/Chromium/ 

Copper/Manganese/ Seleni/Zn 0.5 ml/ Insulin Human Regular 20 unit/ Total 

Parenteral Nutrition/Amino Acids/Dextrose/ Fat Emulsion Intravenous 1,400 ml @  

58.333 mls/ hr TPN  CONT IV  Last administered on 4/2/20at 22:45;  Start 4/2/20 

at 22:00;  Stop 4/3/20 at 21:59;  Status DC


Sodium Chloride 1,000 ml @  1,000 mls/hr Q1H PRN IV hypotension;  Start 4/3/20 

at 07:30;  Stop 4/3/20 at 13:29;  Status DC


Albumin Human 200 ml @  200 mls/hr 1X PRN  PRN IV Hypotension Last administered 

on 4/3/20at 09:36;  Start 4/3/20 at 07:30;  Stop 4/3/20 at 13:29;  Status DC


Sodium Chloride (Normal Saline Flush) 10 ml 1X PRN  PRN IV AP catheter pack;  

Start 4/3/20 at 07:30;  Stop 4/3/20 at 21:29;  Status DC


Sodium Chloride (Normal Saline Flush) 10 ml 1X PRN  PRN IV  catheter pack;  

Start 4/3/20 at 07:30;  Stop 4/4/20 at 07:29;  Status DC


Sodium Chloride 1,000 ml @  400 mls/hr Q2H30M PRN IV PATENCY;  Start 4/3/20 at 

07:30;  Stop 4/3/20 at 19:29;  Status DC


Info (PHARMACY MONITORING -- do not chart) 1 each PRN DAILY  PRN MC SEE 

COMMENTS;  Start 4/3/20 at 07:30;  Stop 4/3/20 at 13:02;  Status DC


Info (PHARMACY MONITORING -- do not chart) 1 each PRN DAILY  PRN MC SEE 

COMMENTS;  Start 4/3/20 at 07:30;  Stop 4/5/20 at 12:45;  Status DC


Sodium Chloride 90 meq/Potassium Chloride 15 meq/ Potassium Phosphate 10 mmol/ 

Magnesium Sulfate 8 meq/Calcium Gluconate 15 meq/ Multivitamins 10 ml/Chromium/ 

Copper/Manganese/ Seleni/Zn 0.5 ml/ Insulin Human Regular 25 unit/ Total 

Parenteral Nutrition/Amino Acids/Dextrose/ Fat Emulsion Intravenous 1,400 ml @  

58.333 mls/ hr TPN  CONT IV  Last administered on 4/3/20at 22:19;  Start 4/3/20 

at 22:00;  Stop 4/4/20 at 21:59;  Status DC


Heparin Sodium (Porcine) (Heparin Sodium) 5,000 unit Q12HR SQ  Last administered

on 4/26/20at 08:59;  Start 4/3/20 at 21:00;  Stop 4/26/20 at 10:05;  Status DC


Ondansetron HCl (Zofran) 4 mg PRN Q6HRS  PRN IV NAUSEA/VOMITING;  Start 4/6/20 

at 07:00;  Stop 4/7/20 at 06:59;  Status DC


Fentanyl Citrate (Fentanyl 2ml Vial) 25 mcg PRN Q5MIN  PRN IV MILD PAIN 1-3;  

Start 4/6/20 at 07:00;  Stop 4/7/20 at 06:59;  Status DC


Fentanyl Citrate (Fentanyl 2ml Vial) 50 mcg PRN Q5MIN  PRN IV MODERATE TO SEVERE

PAIN;  Start 4/6/20 at 07:00;  Stop 4/7/20 at 06:59;  Status DC


Ringer's Solution 1,000 ml @  30 mls/hr Q24H IV ;  Start 4/6/20 at 07:00;  Stop 

4/6/20 at 18:59;  Status DC


Lidocaine HCl (Xylocaine-Mpf 1% 2ml Vial) 2 ml PRN 1X  PRN ID PRIOR TO IV START;

 Start 4/6/20 at 07:00;  Stop 4/7/20 at 06:59;  Status DC


Prochlorperazine Edisylate (Compazine) 5 mg PACU PRN  PRN IV NAUSEA, MRX1;  

Start 4/6/20 at 07:00;  Stop 4/7/20 at 06:59;  Status DC


Sodium Chloride 1,000 ml @  1,000 mls/hr Q1H PRN IV hypotension;  Start 4/4/20 

at 09:10;  Stop 4/4/20 at 15:09;  Status DC


Albumin Human 200 ml @  200 mls/hr 1X PRN  PRN IV Hypotension Last administered 

on 4/4/20at 10:10;  Start 4/4/20 at 09:15;  Stop 4/4/20 at 15:14;  Status DC


Sodium Chloride 1,000 ml @  400 mls/hr Q2H30M PRN IV PATENCY;  Start 4/4/20 at 

09:10;  Stop 4/4/20 at 21:09;  Status DC


Info (PHARMACY MONITORING -- do not chart) 1 each PRN DAILY  PRN MC SEE 

COMMENTS;  Start 4/4/20 at 09:15;  Stop 4/5/20 at 12:45;  Status DC


Info (PHARMACY MONITORING -- do not chart) 1 each PRN DAILY  PRN MC SEE 

COMMENTS;  Start 4/4/20 at 09:15;  Stop 4/5/20 at 12:45;  Status DC


Sodium Chloride 90 meq/Potassium Chloride 15 meq/ Potassium Phosphate 10 mmol/ 

Magnesium Sulfate 8 meq/Calcium Gluconate 15 meq/ Multivitamins 10 ml/Chromium/ 

Copper/Manganese/ Seleni/Zn 0.5 ml/ Insulin Human Regular 25 unit/ Total 

Parenteral Nutrition/Amino Acids/Dextrose/ Fat Emulsion Intravenous 1,400 ml @  

58.333 mls/ hr TPN  CONT IV  Last administered on 4/4/20at 22:10;  Start 4/4/20 

at 22:00;  Stop 4/5/20 at 21:59;  Status DC


Magnesium Sulfate 50 ml @ 25 mls/hr PRN DAILY  PRN IV for Mag < 1.7 on am labs 

Last administered on 4/20/20at 17:27;  Start 4/5/20 at 09:15


Sodium Chloride 90 meq/Potassium Chloride 15 meq/ Potassium Phosphate 10 mmol/ 

Magnesium Sulfate 8 meq/Calcium Gluconate 15 meq/ Multivitamins 10 ml/Chromium/ 

Copper/Manganese/ Seleni/Zn 0.5 ml/ Insulin Human Regular 25 unit/ Total 

Parenteral Nutrition/Amino Acids/Dextrose/ Fat Emulsion Intravenous 1,400 ml @  

58.333 mls/ hr TPN  CONT IV  Last administered on 4/5/20at 21:20;  Start 4/5/20 

at 22:00;  Stop 4/6/20 at 21:59;  Status DC


Sodium Chloride 1,000 ml @  1,000 mls/hr Q1H PRN IV hypotension;  Start 4/5/20 

at 12:23;  Stop 4/5/20 at 18:22;  Status DC


Albumin Human 200 ml @  200 mls/hr 1X  ONCE IV  Last administered on 4/5/20at 

13:34;  Start 4/5/20 at 12:30;  Stop 4/5/20 at 13:29;  Status DC


Diphenhydramine HCl (Benadryl) 25 mg 1X PRN  PRN IV ITCHING;  Start 4/5/20 at 

12:30;  Stop 4/6/20 at 12:29;  Status DC


Diphenhydramine HCl (Benadryl) 25 mg 1X PRN  PRN IV ITCHING;  Start 4/5/20 at 

12:30;  Stop 4/6/20 at 12:29;  Status DC


Info (PHARMACY MONITORING -- do not chart) 1 each PRN DAILY  PRN MC SEE 

COMMENTS;  Start 4/5/20 at 12:30;  Status Cancel


Bupivacaine HCl/ Epinephrine Bitart (Sensorcain-Epi 0.5%-1:730227 Mpf) 30 ml 

STK-MED ONCE .ROUTE  Last administered on 4/6/20at 11:44;  Start 4/6/20 at 

11:00;  Stop 4/6/20 at 11:01;  Status DC


Cellulose (Surgicel Fibrillar 1x2) 1 each STK-MED ONCE .ROUTE ;  Start 4/6/20 at

11:00;  Stop 4/6/20 at 11:01;  Status DC


Sodium Chloride 90 meq/Potassium Chloride 15 meq/ Potassium Phosphate 10 mmol/ 

Magnesium Sulfate 12 meq/Calcium Gluconate 15 meq/ Multivitamins 10 ml/Chromium/

Copper/Manganese/ Seleni/Zn 0.5 ml/ Insulin Human Regular 25 unit/ Total 

Parenteral Nutrition/Amino Acids/Dextrose/ Fat Emulsion Intravenous 1,400 ml @  

58.333 mls/ hr TPN  CONT IV  Last administered on 4/6/20at 22:24;  Start 4/6/20 

at 22:00;  Stop 4/7/20 at 21:59;  Status DC


Propofol 20 ml @ As Directed STK-MED ONCE IV ;  Start 4/6/20 at 11:07;  Stop 

4/6/20 at 11:07;  Status DC


Cellulose (Surgicel Hemostat 4x8) 1 each STK-MED ONCE .ROUTE  Last administered 

on 4/6/20at 11:44;  Start 4/6/20 at 11:55;  Stop 4/6/20 at 11:56;  Status DC


Sevoflurane (Ultane) 60 ml STK-MED ONCE IH ;  Start 4/6/20 at 12:46;  Stop 

4/6/20 at 12:46;  Status DC


Sodium Chloride 1,000 ml @  1,000 mls/hr Q1H PRN IV hypotension;  Start 4/6/20 

at 13:51;  Stop 4/6/20 at 19:50;  Status DC


Albumin Human 200 ml @  200 mls/hr 1X PRN  PRN IV Hypotension Last administered 

on 4/6/20at 14:51;  Start 4/6/20 at 14:00;  Stop 4/6/20 at 19:59;  Status DC


Diphenhydramine HCl (Benadryl) 25 mg 1X PRN  PRN IV ITCHING;  Start 4/6/20 at 

14:00;  Stop 4/7/20 at 13:59;  Status DC


Diphenhydramine HCl (Benadryl) 25 mg 1X PRN  PRN IV ITCHING;  Start 4/6/20 at 

14:00;  Stop 4/7/20 at 13:59;  Status DC


Sodium Chloride 1,000 ml @  400 mls/hr Q2H30M PRN IV PATENCY;  Start 4/6/20 at 

13:51;  Stop 4/7/20 at 01:50;  Status DC


Info (PHARMACY MONITORING -- do not chart) 1 each PRN DAILY  PRN MC SEE 

COMMENTS;  Start 4/6/20 at 14:00;  Stop 4/9/20 at 08:16;  Status DC


Heparin Sodium (Porcine) (Hep Lock Adult) 500 unit STK-MED ONCE IVP ;  Start 

4/7/20 at 09:29;  Stop 4/7/20 at 09:30;  Status DC


Sodium Chloride 1,000 ml @  1,000 mls/hr Q1H PRN IV hypotension;  Start 4/7/20 

at 10:43;  Stop 4/7/20 at 16:42;  Status DC


Sodium Chloride 1,000 ml @  400 mls/hr Q2H30M PRN IV PATENCY;  Start 4/7/20 at 

10:43;  Stop 4/7/20 at 22:42;  Status DC


Info (PHARMACY MONITORING -- do not chart) 1 each PRN DAILY  PRN MC SEE 

COMMENTS;  Start 4/7/20 at 10:45;  Status UNV


Info (PHARMACY MONITORING -- do not chart) 1 each PRN DAILY  PRN MC SEE 

COMMENTS;  Start 4/7/20 at 10:45;  Status UNV


Sodium Chloride 90 meq/Potassium Chloride 15 meq/ Magnesium Sulfate 12 

meq/Calcium Gluconate 15 meq/ Multivitamins 10 ml/Chromium/ Copper/Manganese/ 

Seleni/Zn 0.5 ml/ Insulin Human Regular 25 unit/ Total Parenteral 

Nutrition/Amino Acids/Dextrose/ Fat Emulsion Intravenous 1,400 ml @  58.333 mls/

hr TPN  CONT IV  Last administered on 4/7/20at 22:13;  Start 4/7/20 at 22:00;  

Stop 4/8/20 at 21:59;  Status DC


Sodium Chloride 1,000 ml @  1,000 mls/hr Q1H PRN IV hypotension;  Start 4/8/20 

at 07:50;  Stop 4/8/20 at 13:49;  Status DC


Albumin Human 200 ml @  200 mls/hr 1X  ONCE IV ;  Start 4/8/20 at 08:00;  Stop 

4/8/20 at 08:53;  Status DC


Diphenhydramine HCl (Benadryl) 25 mg 1X PRN  PRN IV ITCHING;  Start 4/8/20 at 

08:00;  Stop 4/9/20 at 07:59;  Status DC


Diphenhydramine HCl (Benadryl) 25 mg 1X PRN  PRN IV ITCHING;  Start 4/8/20 at 

08:00;  Stop 4/9/20 at 07:59;  Status DC


Info (PHARMACY MONITORING -- do not chart) 1 each PRN DAILY  PRN MC SEE 

COMMENTS;  Start 4/8/20 at 08:00;  Stop 4/9/20 at 08:16;  Status DC


Albumin Human 50 ml @ 50 mls/hr 1X  ONCE IV ;  Start 4/8/20 at 08:53;  Stop 

4/8/20 at 08:56;  Status DC


Albumin Human 200 ml @  50 mls/hr PRN 1X  PRN IV HYPOTENSION Last administered 

on 4/14/20at 11:54;  Start 4/8/20 at 09:00


Meropenem 500 mg/ Sodium Chloride 50 ml @  100 mls/hr Q12H IV  Last administered

on 4/28/20at 10:45;  Start 4/8/20 at 10:00;  Stop 4/28/20 at 12:37;  Status DC


Sodium Chloride 90 meq/Magnesium Sulfate 12 meq/ Calcium Gluconate 15 meq/ 

Multivitamins 10 ml/Chromium/ Copper/Manganese/ Seleni/Zn 0.5 ml/ Insulin Human 

Regular 25 unit/ Total Parenteral Nutrition/Amino Acids/Dextrose/ Fat Emulsion 

Intravenous 1,400 ml @  58.333 mls/ hr TPN  CONT IV  Last administered on 

4/8/20at 21:41;  Start 4/8/20 at 22:00;  Stop 4/9/20 at 21:59;  Status DC


Sodium Chloride 1,000 ml @  1,000 mls/hr Q1H PRN IV hypotension;  Start 4/9/20 

at 07:58;  Stop 4/9/20 at 13:57;  Status DC


Albumin Human 200 ml @  200 mls/hr 1X PRN  PRN IV Hypotension Last administered 

on 4/9/20at 09:30;  Start 4/9/20 at 08:00;  Stop 4/9/20 at 13:59;  Status DC


Sodium Chloride 1,000 ml @  400 mls/hr Q2H30M PRN IV PATENCY;  Start 4/9/20 at 

07:58;  Stop 4/9/20 at 19:57;  Status DC


Info (PHARMACY MONITORING -- do not chart) 1 each PRN DAILY  PRN MC SEE 

COMMENTS;  Start 4/9/20 at 08:00;  Status Cancel


Info (PHARMACY MONITORING -- do not chart) 1 each PRN DAILY  PRN MC SEE 

COMMENTS;  Start 4/9/20 at 08:15;  Status UNV


Sodium Chloride 90 meq/Potassium Phosphate 5 mmol/ Magnesium Sulfate 12 

meq/Calcium Gluconate 15 meq/ Multivitamins 10 ml/Chromium/ Copper/Manganese/ 

Seleni/Zn 0.5 ml/ Insulin Human Regular 30 unit/ Total Parenteral 

Nutrition/Amino Acids/Dextrose/ Fat Emulsion Intravenous 1,400 ml @  58.333 mls/

hr TPN  CONT IV  Last administered on 4/9/20at 22:08;  Start 4/9/20 at 22:00;  

Stop 4/10/20 at 21:59;  Status DC


Linezolid/Dextrose 300 ml @  300 mls/hr Q12HR IV  Last administered on 4/20/20at

20:40;  Start 4/10/20 at 11:00;  Stop 4/21/20 at 08:10;  Status DC


Sodium Chloride 90 meq/Potassium Phosphate 15 mmol/ Magnesium Sulfate 12 meq/

Calcium Gluconate 15 meq/ Multivitamins 10 ml/Chromium/ Copper/Manganese/ 

Seleni/Zn 0.5 ml/ Insulin Human Regular 30 unit/ Total Parenteral 

Nutrition/Amino Acids/Dextrose/ Fat Emulsion Intravenous 1,400 ml @  58.333 mls/

hr TPN  CONT IV  Last administered on 4/10/20at 21:49;  Start 4/10/20 at 22:00; 

Stop 4/11/20 at 21:59;  Status DC


Sodium Chloride 90 meq/Potassium Phosphate 15 mmol/ Magnesium Sulfate 12 

meq/Calcium Gluconate 15 meq/ Multivitamins 10 ml/Chromium/ Copper/Manganese/ 

Seleni/Zn 0.5 ml/ Insulin Human Regular 40 unit/ Total Parenteral 

Nutrition/Amino Acids/Dextrose/ Fat Emulsion Intravenous 1,400 ml @  58.333 mls/

hr TPN  CONT IV  Last administered on 4/11/20at 21:21;  Start 4/11/20 at 22:00; 

Stop 4/12/20 at 21:59;  Status DC


Sodium Chloride 1,000 ml @  1,000 mls/hr Q1H PRN IV hypotension;  Start 4/11/20 

at 13:26;  Stop 4/11/20 at 19:25;  Status DC


Albumin Human 200 ml @  200 mls/hr 1X PRN  PRN IV Hypotension Last administered 

on 4/11/20at 15:00;  Start 4/11/20 at 13:30;  Stop 4/11/20 at 19:29;  Status DC


Sodium Chloride (Normal Saline Flush) 10 ml 1X PRN  PRN IV AP catheter pack;  

Start 4/11/20 at 13:30;  Stop 4/12/20 at 13:29;  Status DC


Sodium Chloride (Normal Saline Flush) 10 ml 1X PRN  PRN IV  catheter pack;  

Start 4/11/20 at 13:30;  Stop 4/12/20 at 13:29;  Status DC


Sodium Chloride 1,000 ml @  400 mls/hr Q2H30M PRN IV PATENCY;  Start 4/11/20 at 

13:26;  Stop 4/12/20 at 01:25;  Status DC


Info (PHARMACY MONITORING -- do not chart) 1 each PRN DAILY  PRN MC SEE 

COMMENTS;  Start 4/11/20 at 13:30;  Stop 4/11/20 at 13:33;  Status DC


Info (PHARMACY MONITORING -- do not chart) 1 each PRN DAILY  PRN MC SEE 

COMMENTS;  Start 4/11/20 at 13:30;  Stop 4/11/20 at 13:34;  Status DC


Sodium Chloride 90 meq/Potassium Phosphate 19 mmol/ Magnesium Sulfate 12 meq/C

alcium Gluconate 15 meq/ Multivitamins 10 ml/Chromium/ Copper/Manganese/ 

Seleni/Zn 0.5 ml/ Insulin Human Regular 40 unit/ Total Parenteral 

Nutrition/Amino Acids/Dextrose/ Fat Emulsion Intravenous 1,400 ml @  58.333 mls/

hr TPN  CONT IV  Last administered on 4/12/20at 21:54;  Start 4/12/20 at 22:00; 

Stop 4/13/20 at 21:59;  Status DC


Sodium Chloride 1,000 ml @  1,000 mls/hr Q1H PRN IV hypotension;  Start 4/13/20 

at 09:35;  Stop 4/13/20 at 15:34;  Status DC


Albumin Human 200 ml @  200 mls/hr 1X PRN  PRN IV Hypotension;  Start 4/13/20 at

09:45;  Stop 4/13/20 at 15:44;  Status DC


Diphenhydramine HCl (Benadryl) 25 mg 1X PRN  PRN IV ITCHING;  Start 4/13/20 at 

09:45;  Stop 4/14/20 at 09:44;  Status DC


Diphenhydramine HCl (Benadryl) 25 mg 1X PRN  PRN IV ITCHING;  Start 4/13/20 at 

09:45;  Stop 4/14/20 at 09:44;  Status DC


Sodium Chloride 1,000 ml @  400 mls/hr Q2H30M PRN IV PATENCY;  Start 4/13/20 at 

09:35;  Stop 4/13/20 at 21:34;  Status DC


Info (PHARMACY MONITORING -- do not chart) 1 each PRN DAILY  PRN MC SEE SOOE

NTS;  Start 4/13/20 at 09:45;  Status Cancel


Sodium Chloride 100 meq/Potassium Phosphate 19 mmol/ Magnesium Sulfate 12 

meq/Calcium Gluconate 15 meq/ Multivitamins 10 ml/Chromium/ Copper/Manganese/ 

Seleni/Zn 0.5 ml/ Insulin Human Regular 40 unit/ Potassium Chloride 20 meq/ 

Total Parenteral Nutrition/Amino Acids/Dextrose/ Fat Emulsion Intravenous 1,400 

ml @  58.333 mls/ hr TPN  CONT IV  Last administered on 4/13/20at 22:02;  Start 

4/13/20 at 22:00;  Stop 4/14/20 at 21:59;  Status DC


Furosemide (Lasix) 40 mg 1X  ONCE IVP  Last administered on 4/13/20at 14:39;  

Start 4/13/20 at 14:30;  Stop 4/13/20 at 14:31;  Status DC


Metronidazole 100 ml @  100 mls/hr Q8HRS IV  Last administered on 4/21/20at 

06:04;  Start 4/14/20 at 10:00;  Stop 4/21/20 at 08:10;  Status DC


Sodium Chloride 1,000 ml @  1,000 mls/hr Q1H PRN IV hypotension;  Start 4/14/20 

at 08:00;  Stop 4/14/20 at 13:59;  Status DC


Albumin Human 200 ml @  200 mls/hr 1X PRN  PRN IV Hypotension;  Start 4/14/20 at

08:00;  Stop 4/14/20 at 13:59;  Status DC


Sodium Chloride 1,000 ml @  400 mls/hr Q2H30M PRN IV PATENCY;  Start 4/14/20 at 

08:00;  Stop 4/14/20 at 19:59;  Status DC


Info (PHARMACY MONITORING -- do not chart) 1 each PRN DAILY  PRN MC SEE 

COMMENTS;  Start 4/14/20 at 11:30;  Status UNV


Info (PHARMACY MONITORING -- do not chart) 1 each PRN DAILY  PRN MC SEE 

COMMENTS;  Start 4/14/20 at 11:30;  Stop 4/16/20 at 12:13;  Status DC


Sodium Chloride 100 meq/Potassium Phosphate 19 mmol/ Magnesium Sulfate 12 

meq/Calcium Gluconate 15 meq/ Multivitamins 10 ml/Chromium/ Copper/Manganese/ 

Seleni/Zn 0.5 ml/ Insulin Human Regular 40 unit/ Potassium Chloride 20 meq/ 

Total Parenteral Nutrition/Amino Acids/Dextrose/ Fat Emulsion Intravenous 1,400 

ml @  58.333 mls/ hr TPN  CONT IV  Last administered on 4/14/20at 21:52;  Start 

4/14/20 at 22:00;  Stop 4/15/20 at 21:59;  Status DC


Sodium Chloride (Normal Saline Flush) 10 ml QSHIFT  PRN IV AFTER MEDS AND BLOOD 

DRAWS;  Start 4/14/20 at 15:00


Sodium Chloride (Normal Saline Flush) 10 ml PRN Q5MIN  PRN IV AFTER MEDS AND 

BLOOD DRAWS;  Start 4/14/20 at 15:00


Sodium Chloride (Normal Saline Flush) 20 ml PRN Q5MIN  PRN IV AFTER MEDS AND 

BLOOD DRAWS;  Start 4/14/20 at 15:00


Sodium Chloride 100 meq/Potassium Phosphate 19 mmol/ Magnesium Sulfate 12 

meq/Calcium Gluconate 15 meq/ Multivitamins 10 ml/Chromium/ Copper/Manganese/ 

Seleni/Zn 0.5 ml/ Insulin Human Regular 40 unit/ Potassium Chloride 20 meq/ 

Total Parenteral Nutrition/Amino Acids/Dextrose/ Fat Emulsion Intravenous 1,400 

ml @  58.333 mls/ hr TPN  CONT IV  Last administered on 4/15/20at 21:20;  Start 

4/15/20 at 22:00;  Stop 4/16/20 at 21:59;  Status DC


Lidocaine HCl (Buffered Lidocaine 1%) 3 ml STK-MED ONCE .ROUTE ;  Start 4/15/20 

at 13:16;  Stop 4/15/20 at 13:16;  Status DC


Lidocaine HCl (Buffered Lidocaine 1%) 6 ml 1X  ONCE INJ  Last administered on 

4/15/20at 13:45;  Start 4/15/20 at 13:30;  Stop 4/15/20 at 13:31;  Status DC


Albumin Human 100 ml @  100 mls/hr 1X  ONCE IV  Last administered on 4/15/20at 

15:41;  Start 4/15/20 at 15:00;  Stop 4/15/20 at 15:59;  Status DC


Albumin Human 50 ml @ 50 mls/hr 1X  ONCE IV  Last administered on 4/15/20at 

15:00;  Start 4/15/20 at 15:00;  Stop 4/15/20 at 15:59;  Status DC


Info (PHARMACY MONITORING -- do not chart) 1 each PRN DAILY  PRN MC SEE 

COMMENTS;  Start 4/16/20 at 11:30;  Status Cancel


Info (PHARMACY MONITORING -- do not chart) 1 each PRN DAILY  PRN MC SEE 

COMMENTS;  Start 4/16/20 at 11:30;  Status UNV


Sodium Chloride 100 meq/Potassium Phosphate 10 mmol/ Magnesium Sulfate 12 

meq/Calcium Gluconate 15 meq/ Multivitamins 10 ml/Chromium/ Copper/Manganese/ 

Seleni/Zn 0.5 ml/ Insulin Human Regular 35 unit/ Potassium Chloride 20 meq/ 

Total Parenteral Nutrition/Amino Acids/Dextrose/ Fat Emulsion Intravenous 1,400 

ml @  58.333 mls/ hr TPN  CONT IV  Last administered on 4/16/20at 22:10;  Start 

4/16/20 at 22:00;  Stop 4/17/20 at 21:59;  Status DC


Sodium Chloride 100 meq/Potassium Phosphate 5 mmol/ Magnesium Sulfate 12 

meq/Calcium Gluconate 15 meq/ Multivitamins 10 ml/Chromium/ Copper/Manganese/ 

Seleni/Zn 0.5 ml/ Insulin Human Regular 35 unit/ Potassium Chloride 20 meq/ 

Total Parenteral Nutrition/Amino Acids/Dextrose/ Fat Emulsion Intravenous 1,400 

ml @  58.333 mls/ hr TPN  CONT IV  Last administered on 4/17/20at 22:59;  Start 

4/17/20 at 22:00;  Stop 4/18/20 at 21:59;  Status DC


Sodium Chloride 1,000 ml @  1,000 mls/hr Q1H PRN IV hypotension;  Start 4/18/20 

at 08:27;  Stop 4/18/20 at 14:26;  Status DC


Albumin Human 200 ml @  200 mls/hr 1X PRN  PRN IV Hypotension Last administered 

on 4/18/20at 09:18;  Start 4/18/20 at 08:30;  Stop 4/18/20 at 14:29;  Status DC


Sodium Chloride 1,000 ml @  400 mls/hr Q2H30M PRN IV PATENCY;  Start 4/18/20 at 

08:27;  Stop 4/18/20 at 20:26;  Status DC


Info (PHARMACY MONITORING -- do not chart) 1 each PRN DAILY  PRN MC SEE 

COMMENTS;  Start 4/18/20 at 08:30;  Status Cancel


Info (PHARMACY MONITORING -- do not chart) 1 each PRN DAILY  PRN MC SEE 

COMMENTS;  Start 4/18/20 at 08:30;  Stop 4/26/20 at 13:10;  Status DC


Sodium Chloride 100 meq/Potassium Chloride 40 meq/ Magnesium Sulfate 15 

meq/Calcium Gluconate 15 meq/ Multivitamins 10 ml/Chromium/ Copper/Manganese/ 

Seleni/Zn 0.5 ml/ Insulin Human Regular 35 unit/ Total Parenteral 

Nutrition/Amino Acids/Dextrose/ Fat Emulsion Intravenous 1,400 ml @  58.333 mls/

hr TPN  CONT IV  Last administered on 4/18/20at 22:00;  Start 4/18/20 at 22:00; 

Stop 4/19/20 at 21:59;  Status DC


Potassium Chloride/Water 100 ml @  100 mls/hr 1X  ONCE IV  Last administered on 

4/18/20at 17:28;  Start 4/18/20 at 14:45;  Stop 4/18/20 at 15:44;  Status DC


Sodium Chloride 100 meq/Potassium Chloride 40 meq/ Magnesium Sulfate 15 

meq/Calcium Gluconate 15 meq/ Multivitamins 10 ml/Chromium/ Copper/Manganese/ 

Seleni/Zn 0.5 ml/ Insulin Human Regular 35 unit/ Total Parenteral 

Nutrition/Amino Acids/Dextrose/ Fat Emulsion Intravenous 1,400 ml @  58.333 mls/

hr TPN  CONT IV  Last administered on 4/19/20at 22:46;  Start 4/19/20 at 22:00; 

Stop 4/20/20 at 21:59;  Status DC


Sodium Chloride 100 meq/Potassium Chloride 40 meq/ Magnesium Sulfate 20 

meq/Calcium Gluconate 15 meq/ Multivitamins 10 ml/Chromium/ Copper/Manganese/ 

Seleni/Zn 0.5 ml/ Insulin Human Regular 35 unit/ Total Parenteral 

Nutrition/Amino Acids/Dextrose/ Fat Emulsion Intravenous 1,400 ml @  58.333 mls/

hr TPN  CONT IV  Last administered on 4/20/20at 22:31;  Start 4/20/20 at 22:00; 

Stop 4/21/20 at 21:59;  Status DC


Fentanyl Citrate (Fentanyl 2ml Vial) 50 mcg PRN Q2HR  PRN IVP PAIN Last 

administered on 4/27/20at 13:32;  Start 4/20/20 at 21:00;  Stop 4/28/20 at 

12:53;  Status DC


Fentanyl Citrate (Fentanyl 2ml Vial) 25 mcg PRN Q2HR  PRN IVP PAIN;  Start 

4/20/20 at 21:00;  Stop 4/28/20 at 12:54;  Status DC


Enoxaparin Sodium (Lovenox 100mg Syringe) 100 mg Q12HR SQ ;  Start 4/21/20 at 

21:00;  Status UNV


Amino Acids/ Glycerin/ Electrolytes 1,000 ml @  75 mls/hr [id number] IV ;  Start 

4/20/20 at 21:15;  Status UNV


Sodium Chloride 1,000 ml @  1,000 mls/hr Q1H PRN IV hypotension;  Start 4/21/20 

at 07:56;  Stop 4/21/20 at 13:55;  Status DC


Albumin Human 200 ml @  200 mls/hr 1X PRN  PRN IV Hypotension Last administered 

on 4/21/20at 08:40;  Start 4/21/20 at 08:00;  Stop 4/21/20 at 13:59;  Status DC


Sodium Chloride 1,000 ml @  400 mls/hr Q2H30M PRN IV PATENCY;  Start 4/21/20 at 

07:56;  Stop 4/21/20 at 19:55;  Status DC


Info (PHARMACY MONITORING -- do not chart) 1 each PRN DAILY  PRN MC SEE 

COMMENTS;  Start 4/21/20 at 08:00;  Status UNV


Info (PHARMACY MONITORING -- do not chart) 1 each PRN DAILY  PRN MC SEE 

COMMENTS;  Start 4/21/20 at 08:00;  Status UNV


Daptomycin 430 mg/ Sodium Chloride 50 ml @  100 mls/hr Q24H IV  Last 

administered on 4/21/20at 12:35;  Start 4/21/20 at 09:00;  Stop 4/21/20 at 

12:49;  Status DC


Sodium Chloride 100 meq/Potassium Chloride 40 meq/ Magnesium Sulfate 20 

meq/Calcium Gluconate 15 meq/ Multivitamins 10 ml/Chromium/ Copper/Manganese/ 

Seleni/Zn 0.5 ml/ Insulin Human Regular 35 unit/ Total Parenteral 

Nutrition/Amino Acids/Dextrose/ Fat Emulsion Intravenous 1,400 ml @  58.333 mls/

hr TPN  CONT IV  Last administered on 4/21/20at 21:26;  Start 4/21/20 at 22:00; 

Stop 4/22/20 at 21:59;  Status DC


Daptomycin 430 mg/ Sodium Chloride 50 ml @  100 mls/hr Q48H IV ;  Start 4/23/20 

at 09:00;  Stop 4/22/20 at 11:55;  Status DC


Sodium Chloride 100 meq/Potassium Chloride 40 meq/ Magnesium Sulfate 20 

meq/Calcium Gluconate 15 meq/ Multivitamins 10 ml/Chromium/ Copper/Manganese/ 

Seleni/Zn 0.5 ml/ Insulin Human Regular 35 unit/ Total Parenteral 

Nutrition/Amino Acids/Dextrose/ Fat Emulsion Intravenous 1,400 ml @  58.333 mls/

hr TPN  CONT IV  Last administered on 4/22/20at 22:27;  Start 4/22/20 at 22:00; 

Stop 4/23/20 at 21:59;  Status DC


Daptomycin 430 mg/ Sodium Chloride 50 ml @  100 mls/hr Q24H IV  Last administ

ered on 4/24/20at 15:07;  Start 4/22/20 at 13:00;  Stop 4/25/20 at 13:15;  

Status DC


Sodium Chloride 100 meq/Potassium Chloride 40 meq/ Magnesium Sulfate 20 

meq/Calcium Gluconate 10 meq/ Multivitamins 10 ml/Chromium/ Copper/Manganese/ 

Seleni/Zn 0.5 ml/ Insulin Human Regular 35 unit/ Total Parenteral 

Nutrition/Amino Acids/Dextrose/ Fat Emulsion Intravenous 1,400 ml @  58.333 mls/

hr TPN  CONT IV  Last administered on 4/24/20at 00:06;  Start 4/23/20 at 22:00; 

Stop 4/24/20 at 21:59;  Status DC


Alteplase, Recombinant (Cathflo For Central Catheter Clearance) 1 mg 1X  ONCE 

INT CAT  Last administered on 4/24/20at 11:44;  Start 4/24/20 at 10:45;  Stop 

4/24/20 at 10:46;  Status DC


Ondansetron HCl (Zofran) 4 mg PRN Q6HRS  PRN IV NAUSEA/VOMITING;  Start 4/27/20 

at 07:00;  Stop 4/28/20 at 06:59;  Status DC


Fentanyl Citrate (Fentanyl 2ml Vial) 25 mcg PRN Q5MIN  PRN IV MILD PAIN 1-3;  

Start 4/27/20 at 07:00;  Stop 4/28/20 at 06:59;  Status DC


Fentanyl Citrate (Fentanyl 2ml Vial) 50 mcg PRN Q5MIN  PRN IV MODERATE TO SEVERE

PAIN Last administered on 4/27/20at 10:17;  Start 4/27/20 at 07:00;  Stop 

4/28/20 at 06:59;  Status DC


Ringer's Solution 1,000 ml @  30 mls/hr Q24H IV ;  Start 4/27/20 at 07:00;  Stop

4/27/20 at 18:59;  Status DC


Lidocaine HCl (Xylocaine-Mpf 1% 2ml Vial) 2 ml PRN 1X  PRN ID PRIOR TO IV START;

 Start 4/27/20 at 07:00;  Stop 4/28/20 at 06:59;  Status DC


Prochlorperazine Edisylate (Compazine) 5 mg PACU PRN  PRN IV NAUSEA, MRX1;  

Start 4/27/20 at 07:00;  Stop 4/28/20 at 06:59;  Status DC


Sodium Acetate 50 meq/Potassium Acetate 55 meq/ Magnesium Sulfate 20 meq/Calcium

Gluconate 10 meq/ Multivitamins 10 ml/Chromium/ Copper/Manganese/ Seleni/Zn 0.5 

ml/ Insulin Human Regular 35 unit/ Total Parenteral Nutrition/Amino 

Acids/Dextrose/ Fat Emulsion Intravenous 1,400 ml @  58.333 mls/ hr TPN  CONT IV

;  Start 4/24/20 at 22:00;  Stop 4/24/20 at 14:15;  Status DC


Sodium Acetate 50 meq/Potassium Acetate 55 meq/ Magnesium Sulfate 20 meq/Calcium

Gluconate 10 meq/ Multivitamins 10 ml/Chromium/ Copper/Manganese/ Seleni/Zn 0.5 

ml/ Insulin Human Regular 35 unit/ Total Parenteral Nutrition/Amino 

Acids/Dextrose/ Fat Emulsion Intravenous 1,800 ml @  75 mls/hr TPN  CONT IV  

Last administered on 4/24/20at 22:38;  Start 4/24/20 at 22:00;  Stop 4/25/20 at 

21:59;  Status DC


Sodium Chloride 1,000 ml @  1,000 mls/hr Q1H PRN IV hypotension;  Start 4/24/20 

at 15:31;  Stop 4/24/20 at 21:30;  Status DC


Diphenhydramine HCl (Benadryl) 25 mg 1X PRN  PRN IV ITCHING;  Start 4/24/20 at 

15:45;  Stop 4/25/20 at 15:44;  Status DC


Diphenhydramine HCl (Benadryl) 25 mg 1X PRN  PRN IV ITCHING;  Start 4/24/20 at 

15:45;  Stop 4/25/20 at 15:44;  Status DC


Sodium Chloride 1,000 ml @  400 mls/hr Q2H30M PRN IV PATENCY;  Start 4/24/20 at 

15:31;  Stop 4/25/20 at 03:30;  Status DC


Info (PHARMACY MONITORING -- do not chart) 1 each PRN DAILY  PRN MC SEE 

COMMENTS;  Start 4/24/20 at 15:45


Sodium Acetate 50 meq/Potassium Acetate 55 meq/ Magnesium Sulfate 20 meq/Calcium

Gluconate 10 meq/ Multivitamins 10 ml/Chromium/ Copper/Manganese/ Seleni/Zn 0.5 

ml/ Insulin Human Regular 35 unit/ Total Parenteral Nutrition/Amino 

Acids/Dextrose/ Fat Emulsion Intravenous 1,800 ml @  75 mls/hr TPN  CONT IV  

Last administered on 4/25/20at 22:03;  Start 4/25/20 at 22:00;  Stop 4/26/20 at 

21:59;  Status DC


Daptomycin 430 mg/ Sodium Chloride 50 ml @  100 mls/hr Q24H IV  Last 

administered on 4/30/20at 13:00;  Start 4/25/20 at 13:00;  Stop 4/30/20 at 

20:58;  Status DC


Heparin Sodium (Porcine) 1000 unit/Sodium Chloride 1,001 ml @  1,001 mls/hr 1X  

ONCE IRR ;  Start 4/27/20 at 06:00;  Stop 4/27/20 at 06:59;  Status DC


Potassium Acetate 55 meq/Magnesium Sulfate 20 meq/ Calcium Gluconate 10 meq/ 

Multivitamins 10 ml/Chromium/ Copper/Manganese/ Seleni/Zn 0.5 ml/ Insulin Human 

Regular 35 unit/ Total Parenteral Nutrition/Amino Acids/Dextrose/ Fat Emulsion 

Intravenous 1,920 ml @  80 mls/hr TPN  CONT IV  Last administered on 4/26/20at 

22:10;  Start 4/26/20 at 22:00;  Stop 4/27/20 at 21:59;  Status DC


Dexamethasone Sodium Phosphate (Decadron) 4 mg STK-MED ONCE .ROUTE ;  Start 

4/27/20 at 10:56;  Stop 4/27/20 at 10:57;  Status DC


Ondansetron HCl (Zofran) 4 mg STK-MED ONCE .ROUTE ;  Start 4/27/20 at 10:56;  

Stop 4/27/20 at 10:57;  Status DC


Rocuronium Bromide (Zemuron) 50 mg STK-MED ONCE .ROUTE ;  Start 4/27/20 at 

10:56;  Stop 4/27/20 at 10:57;  Status DC


Fentanyl Citrate (Fentanyl 2ml Vial) 100 mcg STK-MED ONCE .ROUTE ;  Start 

4/27/20 at 10:56;  Stop 4/27/20 at 10:57;  Status DC


Bupivacaine HCl/ Epinephrine Bitart (Sensorcain-Epi 0.5%-1:[id number] Mpf) 30 ml 

STK-MED ONCE .ROUTE  Last administered on 4/27/20at 12:01;  Start 4/27/20 at 

10:58;  Stop 4/27/20 at 10:58;  Status DC


Cellulose (Surgicel Hemostat 2x14) 1 each STK-MED ONCE .ROUTE ;  Start 4/27/20 

at 10:58;  Stop 4/27/20 at 10:59;  Status DC


Iohexol (Omnipaque 300 Mg/ml) 50 ml STK-MED ONCE .ROUTE ;  Start 4/27/20 at 

10:58;  Stop 4/27/20 at 10:59;  Status DC


Cellulose (Surgicel Hemostat 4x8) 1 each STK-MED ONCE .ROUTE ;  Start 4/27/20 at

10:58;  Stop 4/27/20 at 10:59;  Status DC


Bisacodyl (Dulcolax Supp) 10 mg STK-MED ONCE .ROUTE ;  Start 4/27/20 at 10:59;  

Stop 4/27/20 at 10:59;  Status DC


Heparin Sodium (Porcine) 1000 unit/Sodium Chloride 1,001 ml @  1,001 mls/hr 1X  

ONCE IRR ;  Start 4/27/20 at 12:00;  Stop 4/27/20 at 12:59;  Status DC


Propofol 20 ml @ As Directed STK-MED ONCE IV ;  Start 4/27/20 at 11:05;  Stop 

4/27/20 at 11:05;  Status DC


Sevoflurane (Ultane) 90 ml STK-MED ONCE IH ;  Start 4/27/20 at 11:05;  Stop 

4/27/20 at 11:05;  Status DC


Sevoflurane (Ultane) 60 ml STK-MED ONCE IH ;  Start 4/27/20 at 12:26;  Stop 

4/27/20 at 12:27;  Status DC


Propofol 20 ml @ As Directed STK-MED ONCE IV ;  Start 4/27/20 at 12:26;  Stop 

4/27/20 at 12:27;  Status DC


Phenylephrine HCl (PHENYLEPHRINE in 0.9% NACL PF) 1 mg STK-MED ONCE IV ;  Start 

4/27/20 at 12:34;  Stop 4/27/20 at 12:34;  Status DC


Heparin Sodium (Porcine) (Heparin Sodium) 5,000 unit Q12HR SQ  Last administered

on 5/6/20at 20:57;  Start 4/27/20 at 21:00;  Stop 5/7/20 at 09:59;  Status DC


Sodium Chloride (Normal Saline Flush) 3 ml QSHIFT  PRN IV AFTER MEDS AND BLOOD 

DRAWS;  Start 4/27/20 at 13:45


Naloxone HCl (Narcan) 0.4 mg PRN Q2MIN  PRN IV SEE INSTRUCTIONS;  Start 4/27/20 

at 13:45


Sodium Chloride 1,000 ml @  25 mls/hr Q24H IV  Last administered on 5/9/20at 

02:30;  Start 4/27/20 at 13:37


Naloxone HCl (Narcan) 0.4 mg PRN Q2MIN  PRN IV SEE INSTRUCTIONS;  Start 4/27/20 

at 14:30;  Status UNV


Sodium Chloride 1,000 ml @  25 mls/hr Q24H IV ;  Start 4/27/20 at 14:30;  Status

UNV


Hydromorphone HCl 30 ml @ 0 mls/hr CONT PRN  PRN IV PER PROTOCOL Last 

administered on 5/2/20at 16:08;  Start 4/27/20 at 14:30;  Stop 5/4/20 at 08:55; 

Status DC


Potassium Acetate 55 meq/Magnesium Sulfate 20 meq/ Calcium Gluconate 10 meq/ 

Multivitamins 10 ml/Chromium/ Copper/Manganese/ Seleni/Zn 0.5 ml/ Insulin Human 

Regular 35 unit/ Total Parenteral Nutrition/Amino Acids/Dextrose/ Fat Emulsion 

Intravenous 1,920 ml @  80 mls/hr TPN  CONT IV  Last administered on 4/27/20at 

22:01;  Start 4/27/20 at 22:00;  Stop 4/28/20 at 21:59;  Status DC


Bumetanide (Bumex) 2 mg [id number] IV  Last administered on 5/1/20at 13:50;  Start 

4/28/20 at 14:00;  Stop 5/2/20 at 14:10;  Status DC


Meropenem 1 gm/ Sodium Chloride 100 ml @  200 mls/hr Q8HRS IV  Last administered

on 5/9/20at 13:28;  Start 4/28/20 at 14:00


Potassium Acetate 55 meq/Magnesium Sulfate 20 meq/ Calcium Gluconate 10 meq/ 

Multivitamins 10 ml/Chromium/ Copper/Manganese/ Seleni/Zn 0.5 ml/ Insulin Human 

Regular 35 unit/ Total Parenteral Nutrition/Amino Acids/Dextrose/ Fat Emulsion 

Intravenous 1,920 ml @  80 mls/hr TPN  CONT IV  Last administered on 4/28/20at 

22:02;  Start 4/28/20 at 22:00;  Stop 4/29/20 at 21:59;  Status DC


Hydromorphone HCl (Dilaudid Standard PCA) 12 mg STK-MED ONCE IV ;  Start 4/27/20

at 14:35;  Stop 4/28/20 at 13:53;  Status DC


Artificial Tears (Artificial Tears) 1 drop PRN Q15MIN  PRN OU DRY EYE Last 

administered on 5/8/20at 22:00;  Start 4/29/20 at 05:30


Hydromorphone HCl (Dilaudid Standard PCA) 12 mg STK-MED ONCE IV ;  Start 4/28/20

at 12:05;  Stop 4/29/20 at 09:15;  Status DC


Potassium Acetate 65 meq/Magnesium Sulfate 20 meq/ Calcium Gluconate 10 meq/ 

Multivitamins 10 ml/Chromium/ Copper/Manganese/ Seleni/Zn 0.5 ml/ Insulin Human 

Regular 30 unit/ Total Parenteral Nutrition/Amino Acids/Dextrose/ Fat Emulsion 

Intravenous 1,920 ml @  80 mls/hr TPN  CONT IV  Last administered on 4/29/20at 

22:22;  Start 4/29/20 at 22:00;  Stop 4/30/20 at 21:59;  Status DC


Cyclobenzaprine HCl (Flexeril) 10 mg PRN Q6HRS  PRN PO MUSCLE SPASMS;  Start 

4/30/20 at 10:45


Potassium Acetate 55 meq/Magnesium Sulfate 20 meq/ Calcium Gluconate 10 meq/ 

Multivitamins 10 ml/Chromium/ Copper/Manganese/ Seleni/Zn 0.5 ml/ Insulin Human 

Regular 30 unit/ Total Parenteral Nutrition/Amino Acids/Dextrose/ Fat Emulsion 

Intravenous 1,920 ml @  80 mls/hr TPN  CONT IV  Last administered on 5/1/20at 

01:00;  Start 4/30/20 at 22:00;  Stop 5/1/20 at 21:59;  Status DC


Magnesium Sulfate 50 ml @ 25 mls/hr 1X  ONCE IV  Last administered on 4/30/20at 

17:18;  Start 4/30/20 at 12:45;  Stop 4/30/20 at 14:44;  Status DC


Potassium Chloride/Water 100 ml @  100 mls/hr 1X  ONCE IV  Last administered on 

5/1/20at 11:27;  Start 5/1/20 at 12:00;  Stop 5/1/20 at 12:59;  Status DC


Hydromorphone HCl (Dilaudid Standard PCA) 12 mg STK-MED ONCE IV ;  Start 4/29/20

at 10:50;  Stop 5/1/20 at 11:02;  Status DC


Hydromorphone HCl (Dilaudid Standard PCA) 12 mg STK-MED ONCE IV ;  Start 4/30/20

at 13:47;  Stop 5/1/20 at 11:03;  Status DC


Potassium Acetate 30 meq/Magnesium Sulfate 20 meq/ Calcium Gluconate 10 meq/ 

Multivitamins 10 ml/Chromium/ Copper/Manganese/ Seleni/Zn 0.5 ml/ Insulin Human 

Regular 30 unit/ Potassium Chloride 30 meq/ Total Parenteral Nutrition/Amino 

Acids/Dextrose/ Fat Emulsion Intravenous 1,920 ml @  80 mls/hr TPN  CONT IV  

Last administered on 5/1/20at 22:34;  Start 5/1/20 at 22:00;  Stop 5/2/20 at 

21:59;  Status DC


Potassium Chloride/Water 100 ml @  100 mls/hr Q1H IV  Last administered on 

5/2/20at 13:05;  Start 5/2/20 at 07:00;  Stop 5/2/20 at 10:59;  Status DC


Magnesium Sulfate 50 ml @ 25 mls/hr 1X  ONCE IV  Last administered on 5/2/20at 

10:34;  Start 5/2/20 at 10:30;  Stop 5/2/20 at 12:29;  Status DC


Potassium Chloride 75 meq/ Magnesium Sulfate 20 meq/Calcium Gluconate 10 meq/ 

Multivitamins 10 ml/Chromium/ Copper/Manganese/ Seleni/Zn 0.5 ml/ Insulin Human 

Regular 30 unit/ Total Parenteral Nutrition/Amino Acids/Dextrose/ Fat Emulsion 

Intravenous 1,920 ml @  80 mls/hr TPN  CONT IV  Last administered on 5/2/20at 

21:51;  Start 5/2/20 at 22:00;  Stop 5/3/20 at 22:00;  Status DC


Potassium Chloride 75 meq/ Magnesium Sulfate 20 meq/Calcium Gluconate 10 meq/ 

Multivitamins 10 ml/Chromium/ Copper/Manganese/ Seleni/Zn 0.5 ml/ Insulin Human 

Regular 25 unit/ Total Parenteral Nutrition/Amino Acids/Dextrose/ Fat Emulsion 

Intravenous 1,920 ml @  80 mls/hr TPN  CONT IV  Last administered on 5/3/20at 

22:04;  Start 5/3/20 at 22:00;  Stop 5/4/20 at 21:59;  Status DC


Hydromorphone HCl (Dilaudid) 0.4 mg PRN Q4HRS  PRN IVP PAIN Last administered on

5/4/20at 10:57;  Start 5/4/20 at 09:00;  Stop 5/4/20 at 18:59;  Status DC


Micafungin Sodium 100 mg/Dextrose 100 ml @  100 mls/hr Q24H IV  Last 

administered on 5/9/20at 11:25;  Start 5/4/20 at 11:00


Daptomycin 485 mg/ Sodium Chloride 50 ml @  100 mls/hr Q24H IV  Last 

administered on 5/9/20at 13:26;  Start 5/4/20 at 11:00


Potassium Chloride 75 meq/ Magnesium Sulfate 15 meq/Calcium Gluconate 8 meq/ 

Multivitamins 10 ml/Chromium/ Copper/Manganese/ Seleni/Zn 0.5 ml/ Insulin Human 

Regular 25 unit/ Total Parenteral Nutrition/Amino Acids/Dextrose/ Fat Emulsion 

Intravenous 1,920 ml @  80 mls/hr TPN  CONT IV  Last administered on 5/4/20at 

23:08;  Start 5/4/20 at 22:00;  Stop 5/5/20 at 21:59;  Status DC


Haloperidol Lactate (Haldol Inj) 3 mg 1X  ONCE IVP  Last administered on 

5/4/20at 14:37;  Start 5/4/20 at 14:30;  Stop 5/4/20 at 14:31;  Status DC


Hydromorphone HCl (Dilaudid) 1 mg PRN Q4HRS  PRN IVP PAIN Last administered on 

5/9/20at 15:30;  Start 5/4/20 at 19:00


Potassium Chloride 75 meq/ Magnesium Sulfate 15 meq/Calcium Gluconate 8 meq/ 

Multivitamins 10 ml/Chromium/ Copper/Manganese/ Seleni/Zn 0.5 ml/ Insulin Human 

Regular 20 unit/ Total Parenteral Nutrition/Amino Acids/Dextrose/ Fat Emulsion 

Intravenous 1,920 ml @  80 mls/hr TPN  CONT IV  Last administered on 5/5/20at 

22:10;  Start 5/5/20 at 22:00;  Stop 5/6/20 at 21:59;  Status DC


Lidocaine HCl (Buffered Lidocaine 1%) 3 ml STK-MED ONCE .ROUTE ;  Start 5/6/20 

at 11:31;  Stop 5/6/20 at 11:31;  Status DC


Lidocaine HCl (Buffered Lidocaine 1%) 3 ml STK-MED ONCE .ROUTE ;  Start 5/6/20 

at 12:28;  Stop 5/6/20 at 12:29;  Status DC


Lidocaine HCl (Buffered Lidocaine 1%) 6 ml 1X  ONCE INJ  Last administered on 

5/6/20at 12:53;  Start 5/6/20 at 12:45;  Stop 5/6/20 at 12:46;  Status DC


Potassium Chloride 75 meq/ Magnesium Sulfate 15 meq/Calcium Gluconate 8 meq/ 

Multivitamins 10 ml/Chromium/ Copper/Manganese/ Seleni/Zn 0.5 ml/ Insulin Human 

Regular 20 unit/ Total Parenteral Nutrition/Amino Acids/Dextrose/ Fat Emulsion 

Intravenous 1,920 ml @  80 mls/hr TPN  CONT IV  Last administered on 5/6/20at 

22:00;  Start 5/6/20 at 22:00;  Stop 5/7/20 at 21:59;  Status DC


Potassium Chloride 75 meq/ Magnesium Sulfate 15 meq/Calcium Gluconate 8 meq/ 

Multivitamins 10 ml/Chromium/ Copper/Manganese/ Seleni/Zn 0.5 ml/ Insulin Human 

Regular 15 unit/ Total Parenteral Nutrition/Amino Acids/Dextrose/ Fat Emulsion 

Intravenous 1,920 ml @  80 mls/hr TPN  CONT IV  Last administered on 5/7/20at 

22:28;  Start 5/7/20 at 22:00;  Stop 5/8/20 at 21:59;  Status DC


Vecuronium Bromide (Norcuron Bolus) 6 mg PRN Q6HRS  PRN IV VENT ASYNCHRONY;  

Start 5/7/20 at 19:15;  Stop 5/7/20 at 19:35;  Status DC


Bumetanide (Bumex) 2 mg 1X  ONCE IV  Last administered on 5/7/20at 22:09;  Start

5/7/20 at 19:45;  Stop 5/7/20 at 19:46;  Status DC


Lidocaine HCl (Buffered Lidocaine 1%) 3 ml STK-MED ONCE .ROUTE ;  Start 5/8/20 

at 07:59;  Stop 5/8/20 at 07:59;  Status DC


Midazolam HCl (Versed) 5 mg STK-MED ONCE .ROUTE ;  Start 5/8/20 at 08:36;  Stop 

5/8/20 at 08:36;  Status DC


Fentanyl Citrate (Fentanyl 5ml Vial) 250 mcg STK-MED ONCE .ROUTE ;  Start 5/8/20

at 08:36;  Stop 5/8/20 at 08:37;  Status DC


Lidocaine HCl (Buffered Lidocaine 1%) 3 ml 1X  ONCE IJ  Last administered on 

5/8/20at 09:30;  Start 5/8/20 at 09:15;  Stop 5/8/20 at 09:16;  Status DC


Midazolam HCl (Versed) 5 mg 1X  ONCE IV  Last administered on 5/8/20at 09:30;  

Start 5/8/20 at 09:15;  Stop 5/8/20 at 09:16;  Status DC


Fentanyl Citrate (Fentanyl 5ml Vial) 250 mcg 1X  ONCE IV  Last administered on 

5/8/20at 09:30;  Start 5/8/20 at 09:15;  Stop 5/8/20 at 09:16;  Status DC


Bumetanide (Bumex) 2 mg DAILY IV  Last administered on 5/9/20at 11:24;  Start 

5/8/20 at 10:00


Potassium Chloride 75 meq/ Magnesium Sulfate 15 meq/ Multivitamins 10 

ml/Chromium/ Copper/Manganese/ Seleni/Zn 0.5 ml/ Insulin Human Regular 15 unit/ 

Total Parenteral Nutrition/Amino Acids/Dextrose/ Fat Emulsion Intravenous 1,920 

ml @  80 mls/hr TPN  CONT IV  Last administered on 5/8/20at 21:59;  Start 5/8/20

at 22:00;  Stop 5/9/20 at 21:59





Active Scripts


Active


Reported


Bisoprolol Fumarate 5 Mg Tablet 10 Mg PO DAILY


Vitals/I & O





Vital Sign - Last 24 Hours








 5/8/20 5/8/20 5/8/20 5/8/20





 17:00 18:00 19:00 20:00


 


Pulse 95 98 97 


 


Resp 37 32 34 


 


B/P (MAP) 136/74 (94) 134/64 (87) 146/68 (94) 


 


Pulse Ox 97 98 98 


 


O2 Delivery Tracheal Collar Tracheal Collar Tracheal Collar Trach Collar


 


O2 Flow Rate 8.0 8.0 8.0 





 5/8/20 5/8/20 5/8/20 5/8/20





 20:00 21:00 22:00 22:00


 


Temp 98.1   





 98.1   


 


Pulse 105 107 120 


 


Resp 35 34 45 44


 


B/P (MAP) 136/80 (98) 112/66 (81) 161/82 (108) 


 


Pulse Ox 99 100 100 100


 


O2 Delivery Tracheal Collar Tracheal Collar Tracheal Collar 


 


O2 Flow Rate 8.0 8.0 8.0 8.0


 


    





    





 5/8/20 5/8/20 5/9/20 5/9/20





 22:30 23:00 00:00 00:00


 


Temp   98.7 





   98.7 


 


Pulse  119 126 


 


Resp 38 32 32 


 


B/P (MAP)  135/58 (83) 149/75 (99) 


 


Pulse Ox 92 100 95 


 


O2 Delivery  Tracheal Collar Tracheal Collar Trach Collar


 


O2 Flow Rate 8.0 8.0 8.0 


 


    





    





 5/9/20 5/9/20 5/9/20 5/9/20





 01:00 01:53 02:00 02:23


 


Pulse 132  130 


 


Resp 32 37 37 36


 


B/P (MAP) 168/84 (112)  171/80 (110) 


 


Pulse Ox 92 92 93 92


 


O2 Delivery Tracheal Collar Tracheal Collar Tracheal Collar Tracheal Collar


 


O2 Flow Rate 8.0 8.0 8.0 8.0





 5/9/20 5/9/20 5/9/20 5/9/20





 03:00 04:00 04:00 04:47


 


Temp  97.3  





  97.3  


 


Pulse 131 127  


 


Resp 37 33  


 


B/P (MAP) 180/86 (117) 170/86 (114)  


 


Pulse Ox 92 92  99


 


O2 Delivery Tracheal Collar Tracheal Collar Trach Collar Ventilator


 


O2 Flow Rate 8.0 8.0  


 


    





    





 5/9/20 5/9/20 5/9/20 5/9/20





 05:00 06:00 07:00 07:37


 


Pulse 99 96 96 


 


Resp 37 27 27 


 


B/P (MAP) 120/66 (84) 108/61 (77) 128/71 (90) 


 


Pulse Ox 97 100 100 100


 


O2 Delivery Ventilator Ventilator Ventilator Ventilator





 5/9/20 5/9/20 5/9/20 5/9/20





 08:00 08:00 09:00 10:00


 


Temp 98.1   





 98.1   


 


Pulse 96  96 96


 


Resp 27 27 27


 


B/P (MAP) 118/71 (87)  109/72 (84) 100/62 (75)


 


Pulse Ox 100  100 100


 


O2 Delivery Ventilator Trach Collar Ventilator Ventilator


 


    





    





 5/9/20 5/9/20 5/9/20 5/9/20





 11:00 11:45 12:00 15:16


 


Pulse 96   


 


Resp 27   


 


B/P (MAP) 127/77 (94)   


 


Pulse Ox 100 99  97


 


O2 Delivery Ventilator Ventilator Trach Collar Ventilator





 5/9/20   





 15:30   


 


Pulse Ox 97   


 


O2 Delivery Ventilator   














Intake and Output   


 


 5/8/20 5/8/20 5/9/20





 15:00 23:00 07:00


 


Intake Total 290 ml 1161 ml 1693.6 ml


 


Output Total 2900 ml 2050 ml 1365 ml


 


Balance -2610 ml -889 ml 328.6 ml











Hemodynamically unstable?:  No


Is patient in severe pain?:  Yes


Is NPO status required?:  Yes











HIRAM BARRERA MD              May 9, 2020 16:19

## 2020-05-09 NOTE — RAD
EXAM: Abdomen, single view.

 

HISTORY: Distention

 

COMPARISON: 5/7/2020

 

FINDINGS: Frontal views of the abdomen are obtained. There are distended 

air-filled small bowel throughout the abdomen. These are similar or 

slightly increased in caliber compared to the prior exam. There is gas 

within the rectum. There is a nasogastric tube within the stomach. There 

are patent till drainage catheters overlying the abdomen. There is also a 

third drain within the lower abdomen terminating to the left of midline.

 

IMPRESSION: 

1. Stable to slight increased distended air-filled loops of bowel 

throughout the abdomen. The transition point is seen. Correlate for ileus.

2. Multiple surgical drains overlying the abdomen.

 

Electronically signed by: Irish Rios MD (5/9/2020 2:36 PM) [facility]

## 2020-05-10 VITALS — DIASTOLIC BLOOD PRESSURE: 78 MMHG | SYSTOLIC BLOOD PRESSURE: 138 MMHG

## 2020-05-10 VITALS — DIASTOLIC BLOOD PRESSURE: 85 MMHG | SYSTOLIC BLOOD PRESSURE: 142 MMHG

## 2020-05-10 VITALS — SYSTOLIC BLOOD PRESSURE: 133 MMHG | DIASTOLIC BLOOD PRESSURE: 67 MMHG

## 2020-05-10 VITALS — SYSTOLIC BLOOD PRESSURE: 127 MMHG | DIASTOLIC BLOOD PRESSURE: 78 MMHG

## 2020-05-10 VITALS — SYSTOLIC BLOOD PRESSURE: 156 MMHG | DIASTOLIC BLOOD PRESSURE: 73 MMHG

## 2020-05-10 VITALS — DIASTOLIC BLOOD PRESSURE: 75 MMHG | SYSTOLIC BLOOD PRESSURE: 150 MMHG

## 2020-05-10 VITALS — DIASTOLIC BLOOD PRESSURE: 69 MMHG | SYSTOLIC BLOOD PRESSURE: 128 MMHG

## 2020-05-10 VITALS — SYSTOLIC BLOOD PRESSURE: 136 MMHG | DIASTOLIC BLOOD PRESSURE: 70 MMHG

## 2020-05-10 VITALS — DIASTOLIC BLOOD PRESSURE: 74 MMHG | SYSTOLIC BLOOD PRESSURE: 147 MMHG

## 2020-05-10 VITALS — DIASTOLIC BLOOD PRESSURE: 75 MMHG | SYSTOLIC BLOOD PRESSURE: 136 MMHG

## 2020-05-10 VITALS — SYSTOLIC BLOOD PRESSURE: 131 MMHG | DIASTOLIC BLOOD PRESSURE: 64 MMHG

## 2020-05-10 VITALS — SYSTOLIC BLOOD PRESSURE: 138 MMHG | DIASTOLIC BLOOD PRESSURE: 78 MMHG

## 2020-05-10 VITALS — DIASTOLIC BLOOD PRESSURE: 73 MMHG | SYSTOLIC BLOOD PRESSURE: 156 MMHG

## 2020-05-10 VITALS — DIASTOLIC BLOOD PRESSURE: 85 MMHG | SYSTOLIC BLOOD PRESSURE: 149 MMHG

## 2020-05-10 VITALS — SYSTOLIC BLOOD PRESSURE: 146 MMHG | DIASTOLIC BLOOD PRESSURE: 76 MMHG

## 2020-05-10 VITALS — SYSTOLIC BLOOD PRESSURE: 140 MMHG | DIASTOLIC BLOOD PRESSURE: 73 MMHG

## 2020-05-10 VITALS — SYSTOLIC BLOOD PRESSURE: 148 MMHG | DIASTOLIC BLOOD PRESSURE: 70 MMHG

## 2020-05-10 VITALS — DIASTOLIC BLOOD PRESSURE: 65 MMHG | SYSTOLIC BLOOD PRESSURE: 140 MMHG

## 2020-05-10 VITALS — DIASTOLIC BLOOD PRESSURE: 66 MMHG | SYSTOLIC BLOOD PRESSURE: 130 MMHG

## 2020-05-10 VITALS — DIASTOLIC BLOOD PRESSURE: 73 MMHG | SYSTOLIC BLOOD PRESSURE: 131 MMHG

## 2020-05-10 VITALS — SYSTOLIC BLOOD PRESSURE: 126 MMHG | DIASTOLIC BLOOD PRESSURE: 78 MMHG

## 2020-05-10 VITALS — SYSTOLIC BLOOD PRESSURE: 138 MMHG | DIASTOLIC BLOOD PRESSURE: 68 MMHG

## 2020-05-10 VITALS — DIASTOLIC BLOOD PRESSURE: 80 MMHG | SYSTOLIC BLOOD PRESSURE: 140 MMHG

## 2020-05-10 VITALS — SYSTOLIC BLOOD PRESSURE: 117 MMHG | DIASTOLIC BLOOD PRESSURE: 70 MMHG

## 2020-05-10 LAB
ALBUMIN SERPL-MCNC: 1.6 G/DL (ref 3.4–5)
ALBUMIN SERPL-MCNC: 1.6 G/DL (ref 3.4–5)
ALBUMIN/GLOB SERPL: 0.4 {RATIO} (ref 1–1.7)
ALBUMIN/GLOB SERPL: 0.4 {RATIO} (ref 1–1.7)
ALP SERPL-CCNC: 123 U/L (ref 46–116)
ALP SERPL-CCNC: 95 U/L (ref 46–116)
ALT SERPL-CCNC: 39 U/L (ref 14–59)
ALT SERPL-CCNC: 41 U/L (ref 14–59)
ANION GAP SERPL CALC-SCNC: 7 MMOL/L (ref 6–14)
ANION GAP SERPL CALC-SCNC: 8 MMOL/L (ref 6–14)
AST SERPL-CCNC: 38 U/L (ref 15–37)
AST SERPL-CCNC: 40 U/L (ref 15–37)
BILIRUB SERPL-MCNC: 0.4 MG/DL (ref 0.2–1)
BILIRUB SERPL-MCNC: 0.5 MG/DL (ref 0.2–1)
BUN SERPL-MCNC: 28 MG/DL (ref 7–20)
BUN SERPL-MCNC: 30 MG/DL (ref 7–20)
BUN/CREAT SERPL: 31 (ref 6–20)
BUN/CREAT SERPL: 43 (ref 6–20)
CALCIUM SERPL-MCNC: 8.2 MG/DL (ref 8.5–10.1)
CALCIUM SERPL-MCNC: 8.2 MG/DL (ref 8.5–10.1)
CHLORIDE SERPL-SCNC: 106 MMOL/L (ref 98–107)
CHLORIDE SERPL-SCNC: 109 MMOL/L (ref 98–107)
CO2 SERPL-SCNC: 31 MMOL/L (ref 21–32)
CO2 SERPL-SCNC: 33 MMOL/L (ref 21–32)
CREAT SERPL-MCNC: 0.7 MG/DL (ref 0.6–1)
CREAT SERPL-MCNC: 0.9 MG/DL (ref 0.6–1)
ERYTHROCYTE [DISTWIDTH] IN BLOOD BY AUTOMATED COUNT: 17.8 % (ref 11.5–14.5)
GFR SERPLBLD BASED ON 1.73 SQ M-ARVRAT: 66.5 ML/MIN
GFR SERPLBLD BASED ON 1.73 SQ M-ARVRAT: 88.9 ML/MIN
GLOBULIN SER-MCNC: 3.8 G/DL (ref 2.2–3.8)
GLOBULIN SER-MCNC: 3.9 G/DL (ref 2.2–3.8)
GLUCOSE SERPL-MCNC: 135 MG/DL (ref 70–99)
GLUCOSE SERPL-MCNC: 166 MG/DL (ref 70–99)
HCT VFR BLD CALC: 22.5 % (ref 36–47)
HGB BLD-MCNC: 7.3 G/DL (ref 12–15.5)
MCH RBC QN AUTO: 29 PG (ref 25–35)
MCHC RBC AUTO-ENTMCNC: 32 G/DL (ref 31–37)
MCV RBC AUTO: 89 FL (ref 79–100)
PLATELET # BLD AUTO: 439 X10^3/UL (ref 140–400)
POTASSIUM SERPL-SCNC: 3.4 MMOL/L (ref 3.5–5.1)
POTASSIUM SERPL-SCNC: 3.4 MMOL/L (ref 3.5–5.1)
PROT SERPL-MCNC: 5.4 G/DL (ref 6.4–8.2)
PROT SERPL-MCNC: 5.5 G/DL (ref 6.4–8.2)
RBC # BLD AUTO: 2.53 X10^6/UL (ref 3.5–5.4)
SODIUM SERPL-SCNC: 146 MMOL/L (ref 136–145)
SODIUM SERPL-SCNC: 148 MMOL/L (ref 136–145)
WBC # BLD AUTO: 10.3 X10^3/UL (ref 4–11)

## 2020-05-10 RX ADMIN — HYDROMORPHONE HYDROCHLORIDE PRN MG: 2 INJECTION INTRAMUSCULAR; INTRAVENOUS; SUBCUTANEOUS at 02:58

## 2020-05-10 RX ADMIN — DEXMEDETOMIDINE HYDROCHLORIDE PRN MLS/HR: 100 INJECTION, SOLUTION, CONCENTRATE INTRAVENOUS at 22:46

## 2020-05-10 RX ADMIN — DEXTROSE SCH MLS/HR: 50 INJECTION, SOLUTION INTRAVENOUS at 12:14

## 2020-05-10 RX ADMIN — ONDANSETRON PRN MG: 2 INJECTION INTRAMUSCULAR; INTRAVENOUS at 17:09

## 2020-05-10 RX ADMIN — INSULIN LISPRO SCH UNITS: 100 INJECTION, SOLUTION INTRAVENOUS; SUBCUTANEOUS at 06:00

## 2020-05-10 RX ADMIN — DEXMEDETOMIDINE HYDROCHLORIDE PRN MLS/HR: 100 INJECTION, SOLUTION, CONCENTRATE INTRAVENOUS at 01:37

## 2020-05-10 RX ADMIN — HYDROMORPHONE HYDROCHLORIDE PRN MG: 2 INJECTION INTRAMUSCULAR; INTRAVENOUS; SUBCUTANEOUS at 23:11

## 2020-05-10 RX ADMIN — INSULIN LISPRO SCH UNITS: 100 INJECTION, SOLUTION INTRAVENOUS; SUBCUTANEOUS at 17:51

## 2020-05-10 RX ADMIN — METOCLOPRAMIDE PRN MG: 5 INJECTION, SOLUTION INTRAMUSCULAR; INTRAVENOUS at 20:38

## 2020-05-10 RX ADMIN — Medication PRN EACH: at 12:57

## 2020-05-10 RX ADMIN — HYDROMORPHONE HYDROCHLORIDE PRN MG: 2 INJECTION INTRAMUSCULAR; INTRAVENOUS; SUBCUTANEOUS at 12:10

## 2020-05-10 RX ADMIN — DEXMEDETOMIDINE HYDROCHLORIDE PRN MLS/HR: 100 INJECTION, SOLUTION, CONCENTRATE INTRAVENOUS at 09:38

## 2020-05-10 RX ADMIN — PANTOPRAZOLE SODIUM SCH MG: 40 INJECTION, POWDER, FOR SOLUTION INTRAVENOUS at 08:12

## 2020-05-10 RX ADMIN — BACITRACIN SCH MLS/HR: 5000 INJECTION, POWDER, FOR SOLUTION INTRAMUSCULAR at 13:30

## 2020-05-10 RX ADMIN — ONDANSETRON PRN MG: 2 INJECTION INTRAMUSCULAR; INTRAVENOUS at 06:50

## 2020-05-10 RX ADMIN — HYDROMORPHONE HYDROCHLORIDE PRN MG: 2 INJECTION INTRAMUSCULAR; INTRAVENOUS; SUBCUTANEOUS at 08:11

## 2020-05-10 RX ADMIN — PROCHLORPERAZINE EDISYLATE PRN MG: 5 INJECTION INTRAMUSCULAR; INTRAVENOUS at 02:57

## 2020-05-10 RX ADMIN — HYDROMORPHONE HYDROCHLORIDE PRN MG: 2 INJECTION INTRAMUSCULAR; INTRAVENOUS; SUBCUTANEOUS at 18:43

## 2020-05-10 RX ADMIN — INSULIN LISPRO SCH UNITS: 100 INJECTION, SOLUTION INTRAVENOUS; SUBCUTANEOUS at 12:00

## 2020-05-10 RX ADMIN — INSULIN LISPRO SCH UNITS: 100 INJECTION, SOLUTION INTRAVENOUS; SUBCUTANEOUS at 00:00

## 2020-05-10 RX ADMIN — MEROPENEM SCH MLS/HR: 1 INJECTION, POWDER, FOR SOLUTION INTRAVENOUS at 21:40

## 2020-05-10 RX ADMIN — MEROPENEM SCH MLS/HR: 1 INJECTION, POWDER, FOR SOLUTION INTRAVENOUS at 06:26

## 2020-05-10 RX ADMIN — METOCLOPRAMIDE PRN MG: 5 INJECTION, SOLUTION INTRAMUSCULAR; INTRAVENOUS at 02:57

## 2020-05-10 RX ADMIN — METOCLOPRAMIDE PRN MG: 5 INJECTION, SOLUTION INTRAMUSCULAR; INTRAVENOUS at 08:06

## 2020-05-10 RX ADMIN — BUMETANIDE SCH MG: 0.25 INJECTION INTRAMUSCULAR; INTRAVENOUS at 10:48

## 2020-05-10 RX ADMIN — MEROPENEM SCH MLS/HR: 1 INJECTION, POWDER, FOR SOLUTION INTRAVENOUS at 15:00

## 2020-05-10 RX ADMIN — DAPTOMYCIN SCH MLS/HR: 500 INJECTION, POWDER, LYOPHILIZED, FOR SOLUTION INTRAVENOUS at 12:09

## 2020-05-10 NOTE — NUR
Pharmacy TPN Dosing Note



S: JESENIA RICH is a 49 year old F Currently receiving Central Continuous TPN started 
03/18/20



B:Pertinent PMH: Necrotizing pancreatitis

Height: 5 feet, 8 inches

Weight: 99.7 kg



Current diet: NPO 



LABS:

Sodium:    148 

Potassium: 3.4 

Chloride:  109 

Calcium:   8.2 

Corrected Calcium: 10.04 

Magnesium: 1.6 

CO2:       31 

SCr:       0.7 

Glucose:   131-156 

Albumin:   1.7 

AST:       40 

ALT:       41 



TPN FORMULA:

TPN TYPE:  Central Continuous

AMINO ACIDS:         90 gm

DEXTROSE:            225 gm

LIPIDS:              30 gm

POTASSIUM CHLORIDE:  80 mEq

MAGNESIUM:           20 mEq

INSULIN:             15 units

MULTIPLE VITAMIN:    10 ml

TRACE ELEMENTS:      0.5 ml(s)



TPN PLAN:  

Replace K with KCl 20 meq IVPB x 1 dose and replace Mag with MagSul 2 gm

IVPB x 1 dose. Increased KCl to 80 mEq in TPN and MagSul to 20 mEq in TPN.

BG remains at goal.

-BMP and Mag in AM.





R: Change TPN as noted above.

Will monitor electrolytes, glucose, and tolerance to TPN.



 LORENZO LESLIE RPH, 05/10/20 [id number]

## 2020-05-10 NOTE — PDOC
Infectious Disease Note


Subjective


Subjective


+ abdominal pain and nausea


No fevers last 24 hrs


Remains on vent/trach FiO2 30% PEEP 5


On TPN





ROS


ROS


limited ROS as patient is on vent and not very responsive





Vital Sign


Vital Signs





Vital Signs








  Date Time  Temp Pulse Resp B/P (MAP) Pulse Ox O2 Delivery O2 Flow Rate FiO2


 


5/10/20 08:18     97 Ventilator  


 


5/10/20 08:11   19     


 


5/10/20 06:00  101  142/85 (104)    


 


5/10/20 04:00 99.2       





 99.2       











Physical Exam


PHYSICAL EXAM


GENERAL: Propped up in bed, appears weak, tired


HEENT:  oral cavity dry, NGT


NECK:  Trach/vent 


LUNGS: Improved aeration


HEART:  S1, S2, regular 


ABDOMEN: Distended, hypoactive BS, tender, + drains x 3


: [place] (4/14)


EXTREMITIES: Generalized edema, no cyanosis, SCDs bilaterally 


SKIN: Warm and dry.  No generalized rash.  


CNS: Nodded some to couple questions 


PICC(4/30) clean





Labs


Lab





Laboratory Tests








Test


 5/9/20


13:34 5/9/20


18:32 5/10/20


00:54 5/10/20


06:15


 


Glucose (Fingerstick)


 182 mg/dL


(70-99) 139 mg/dL


(70-99) 156 mg/dL


(70-99) 





 


White Blood Count


 


 


 


 10.3 x10^3/uL


(4.0-11.0)


 


Red Blood Count


 


 


 


 2.53 x10^6/uL


(3.50-5.40)


 


Hemoglobin


 


 


 


 7.3 g/dL


(12.0-15.5)


 


Hematocrit


 


 


 


 22.5 %


(36.0-47.0)


 


Mean Corpuscular Volume    89 fL () 


 


Mean Corpuscular Hemoglobin    29 pg (25-35) 


 


Mean Corpuscular Hemoglobin


Concent 


 


 


 32 g/dL


(31-37)


 


Red Cell Distribution Width


 


 


 


 17.8 %


(11.5-14.5)


 


Platelet Count


 


 


 


 439 x10^3/uL


(140-400)


 


Sodium Level


 


 


 


 148 mmol/L


(136-145)


 


Potassium Level


 


 


 


 3.4 mmol/L


(3.5-5.1)


 


Chloride Level


 


 


 


 109 mmol/L


()


 


Carbon Dioxide Level


 


 


 


 31 mmol/L


(21-32)


 


Anion Gap    8 (6-14) 


 


Blood Urea Nitrogen


 


 


 


 30 mg/dL


(7-20)


 


Creatinine


 


 


 


 0.7 mg/dL


(0.6-1.0)


 


Estimated GFR


(Cockcroft-Gault) 


 


 


 88.9 





 


BUN/Creatinine Ratio    43 (6-20) 


 


Glucose Level


 


 


 


 135 mg/dL


(70-99)


 


Calcium Level


 


 


 


 8.2 mg/dL


(8.5-10.1)


 


Magnesium Level


 


 


 


 1.6 mg/dL


(1.8-2.4)


 


Total Bilirubin


 


 


 


 0.5 mg/dL


(0.2-1.0)


 


Aspartate Amino Transf


(AST/SGOT) 


 


 


 40 U/L (15-37) 





 


Alanine Aminotransferase


(ALT/SGPT) 


 


 


 41 U/L (14-59) 





 


Alkaline Phosphatase


 


 


 


 123 U/L


()


 


Total Protein


 


 


 


 5.4 g/dL


(6.4-8.2)


 


Albumin


 


 


 


 1.6 g/dL


(3.4-5.0)


 


Albumin/Globulin Ratio    0.4 (1.0-1.7) 


 


Test


 5/10/20


06:23 


 


 





 


Glucose (Fingerstick)


 131 mg/dL


(70-99) 


 


 











Micro


5/4. BLOOD CULTURE  Preliminary  


        NO GROWTH AFTER 5 DAYS 








4/27. abd fluid


  AEROBIC RES 1  Final  


        Organism Identification, Yeast


          Candida parapsilosis








5/6. abd fluid


     ANAEROBIC-AEROBIC CULTURE  


                    PENDING





Objective


Assessment


Fever  - better currently - intermittent could be from underlying pancreatitis 

blood cults 5/4 - neg so far


? Ileus with vomiting


Abd distention - U/S and CT reviewed s/p 0.4 L of opaque, debris-containing 

ascites was removed 5/6


Acute pancreatitis with persistent necrosis


  - 4/27 status post ROBERT drain placement + C paropsilosis. s/p additional drains 

5/8


Anemia - S/p PRBCs


Cholelithiasis with thickening of the gallbladder wall.


Leucocytosis improving


JED, hyperkalemia, Metabolic acidosis off dialysis


Acute hypoxic resp failure ,bilateral pleural effusion and atelectasis


hypocalcemia 


Prediabetes


HTN


s/p trach





Plan


Plan of Care


Continue Dapto 5/4, merrem 4/8 -try to wean soon 


Continue micafungin


f/u cultures 


Maintain aspiration precaution


Supportive care





Critically ill 


Patient seen and examined. Chart reviewed in detail. Case discussed with 

NP.Agree with above plan











HAVEN WINTERS        May 10, 2020 09:24


BISMARK HERNANDEZ MD             May 10, 2020 16:58

## 2020-05-10 NOTE — PDOC
PULMONARY PROGRESS NOTES


Subjective


Patient intubated on 3/23 , s/p trach 4/6, awake alert follows commands


On pressure support PEEP 5 and 30%


Nursing reports ongoing  N/V


Vitals





Vital Signs








  Date Time  Temp Pulse Resp B/P (MAP) Pulse Ox O2 Delivery O2 Flow Rate FiO2


 


5/10/20 08:18     97 Ventilator  


 


5/10/20 08:11   19     


 


5/10/20 06:00  101  142/85 (104)    


 


5/10/20 04:00 99.2       





 99.2       








ROS:  No Chest Pain, No Abdominal Pain, No Increase Cough


General:  Alert, No acute distress


Lungs:  Crackles


Cardiovascular:  S1, S2


Abdomen:  Soft, Non-tender, Other (firm)


Neuro Exam:  Alert


Extremities:  Other (+3 generalized edema )


Skin:  Warm, Dry


Labs





Laboratory Tests








Test


 5/8/20


12:36 5/8/20


18:13 5/9/20


00:18 5/9/20


06:00


 


Glucose (Fingerstick)


 151 mg/dL


(70-99) 150 mg/dL


(70-99) 159 mg/dL


(70-99) 139 mg/dL


(70-99)


 


Sodium Level


 


 


 


 149 mmol/L


(136-145)


 


Potassium Level


 


 


 


 3.9 mmol/L


(3.5-5.1)


 


Chloride Level


 


 


 


 111 mmol/L


()


 


Carbon Dioxide Level


 


 


 


 31 mmol/L


(21-32)


 


Anion Gap    7 (6-14) 


 


Blood Urea Nitrogen


 


 


 


 33 mg/dL


(7-20)


 


Creatinine


 


 


 


 0.8 mg/dL


(0.6-1.0)


 


Estimated GFR


(Cockcroft-Gault) 


 


 


 76.2 





 


Glucose Level


 


 


 


 145 mg/dL


(70-99)


 


Calcium Level


 


 


 


 8.7 mg/dL


(8.5-10.1)


 


Test


 5/9/20


13:34 5/9/20


18:32 5/10/20


00:54 5/10/20


06:15


 


Glucose (Fingerstick)


 182 mg/dL


(70-99) 139 mg/dL


(70-99) 156 mg/dL


(70-99) 





 


White Blood Count


 


 


 


 10.3 x10^3/uL


(4.0-11.0)


 


Red Blood Count


 


 


 


 2.53 x10^6/uL


(3.50-5.40)


 


Hemoglobin


 


 


 


 7.3 g/dL


(12.0-15.5)


 


Hematocrit


 


 


 


 22.5 %


(36.0-47.0)


 


Mean Corpuscular Volume    89 fL () 


 


Mean Corpuscular Hemoglobin    29 pg (25-35) 


 


Mean Corpuscular Hemoglobin


Concent 


 


 


 32 g/dL


(31-37)


 


Red Cell Distribution Width


 


 


 


 17.8 %


(11.5-14.5)


 


Platelet Count


 


 


 


 439 x10^3/uL


(140-400)


 


Sodium Level


 


 


 


 148 mmol/L


(136-145)


 


Potassium Level


 


 


 


 3.4 mmol/L


(3.5-5.1)


 


Chloride Level


 


 


 


 109 mmol/L


()


 


Carbon Dioxide Level


 


 


 


 31 mmol/L


(21-32)


 


Anion Gap    8 (6-14) 


 


Blood Urea Nitrogen


 


 


 


 30 mg/dL


(7-20)


 


Creatinine


 


 


 


 0.7 mg/dL


(0.6-1.0)


 


Estimated GFR


(Cockcroft-Gault) 


 


 


 88.9 





 


BUN/Creatinine Ratio    43 (6-20) 


 


Glucose Level


 


 


 


 135 mg/dL


(70-99)


 


Calcium Level


 


 


 


 8.2 mg/dL


(8.5-10.1)


 


Magnesium Level


 


 


 


 1.6 mg/dL


(1.8-2.4)


 


Total Bilirubin


 


 


 


 0.5 mg/dL


(0.2-1.0)


 


Aspartate Amino Transf


(AST/SGOT) 


 


 


 40 U/L (15-37) 





 


Alanine Aminotransferase


(ALT/SGPT) 


 


 


 41 U/L (14-59) 





 


Alkaline Phosphatase


 


 


 


 123 U/L


()


 


Total Protein


 


 


 


 5.4 g/dL


(6.4-8.2)


 


Albumin


 


 


 


 1.6 g/dL


(3.4-5.0)


 


Albumin/Globulin Ratio    0.4 (1.0-1.7) 


 


Test


 5/10/20


06:23 


 


 





 


Glucose (Fingerstick)


 131 mg/dL


(70-99) 


 


 











Laboratory Tests








Test


 5/9/20


13:34 5/9/20


18:32 5/10/20


00:54 5/10/20


06:15


 


Glucose (Fingerstick)


 182 mg/dL


(70-99) 139 mg/dL


(70-99) 156 mg/dL


(70-99) 





 


White Blood Count


 


 


 


 10.3 x10^3/uL


(4.0-11.0)


 


Red Blood Count


 


 


 


 2.53 x10^6/uL


(3.50-5.40)


 


Hemoglobin


 


 


 


 7.3 g/dL


(12.0-15.5)


 


Hematocrit


 


 


 


 22.5 %


(36.0-47.0)


 


Mean Corpuscular Volume    89 fL () 


 


Mean Corpuscular Hemoglobin    29 pg (25-35) 


 


Mean Corpuscular Hemoglobin


Concent 


 


 


 32 g/dL


(31-37)


 


Red Cell Distribution Width


 


 


 


 17.8 %


(11.5-14.5)


 


Platelet Count


 


 


 


 439 x10^3/uL


(140-400)


 


Sodium Level


 


 


 


 148 mmol/L


(136-145)


 


Potassium Level


 


 


 


 3.4 mmol/L


(3.5-5.1)


 


Chloride Level


 


 


 


 109 mmol/L


()


 


Carbon Dioxide Level


 


 


 


 31 mmol/L


(21-32)


 


Anion Gap    8 (6-14) 


 


Blood Urea Nitrogen


 


 


 


 30 mg/dL


(7-20)


 


Creatinine


 


 


 


 0.7 mg/dL


(0.6-1.0)


 


Estimated GFR


(Cockcroft-Gault) 


 


 


 88.9 





 


BUN/Creatinine Ratio    43 (6-20) 


 


Glucose Level


 


 


 


 135 mg/dL


(70-99)


 


Calcium Level


 


 


 


 8.2 mg/dL


(8.5-10.1)


 


Magnesium Level


 


 


 


 1.6 mg/dL


(1.8-2.4)


 


Total Bilirubin


 


 


 


 0.5 mg/dL


(0.2-1.0)


 


Aspartate Amino Transf


(AST/SGOT) 


 


 


 40 U/L (15-37) 





 


Alanine Aminotransferase


(ALT/SGPT) 


 


 


 41 U/L (14-59) 





 


Alkaline Phosphatase


 


 


 


 123 U/L


()


 


Total Protein


 


 


 


 5.4 g/dL


(6.4-8.2)


 


Albumin


 


 


 


 1.6 g/dL


(3.4-5.0)


 


Albumin/Globulin Ratio    0.4 (1.0-1.7) 


 


Test


 5/10/20


06:23 


 


 





 


Glucose (Fingerstick)


 131 mg/dL


(70-99) 


 


 











Medications





Active Scripts








 Medications  Dose


 Route/Sig


 Max Daily Dose Days Date Category


 


 Bisoprolol


 Fumarate 5 Mg


 Tablet  10 Mg


 PO DAILY


   3/16/20 Reported








Comments


CXR  5/9/2020





IMPRESSION: 


1. Stable diffuse infiltrate and moderate pleural effusions.


2. Stable support lines and tubes.





KUB 5/9/2020


IMPRESSION: 


1. Stable to slight increased distended air-filled loops of bowel 


throughout the abdomen. The transition point is seen. Correlate for ileus.


2. Multiple surgical drains overlying the abdomen.





Impression


.


IMPRESSION:


1.  Acute hypoxemic respiratory failure secondary to ARDS status post trach,


2.  Gallstone pancreatitis


3.  Severe metabolic acidosis.stable


4.  Acute kidney injury-stable, ON HD-- continue to improve 


5.  Acute gallstone pancreatitis.


6.  Hypoalbuminemia.


7.  Moderate persistent effusions


8.  Fever-  Per ID, per surgery--resolved 


9.  Chronic anemia


10. Covid 19 testing negative


11. Moderate to large ascites-S/P paracentisis


12.S/P paracentisis with 4 liters removed on 4/15/20


13. S/P IR drain placement on 5/8/2020





Plan


.


Patient is more awake today, remains on PS- PEEP 5 and 30%-- continue PS monitor

ABG/CXR


Follow surgery recs-- S/P 3 drain placed in IR on 5/8/2020


Follow ID recs for ABX


Follow nephrology recs 


Continue TPN 


DVT/GI PPX: heparin SQ/ protonix 


D/W RN and RT





CODE:FULL











STEVE MIRANDA MD              May 10, 2020 09:07

## 2020-05-10 NOTE — PDOC
PROGRESS NOTES


Chief Complaint


Chief Complaint


Acute hypoxic Respiratory failure requiring mechanical ventilation (on vent 

since 3/23)


Tracheostomy


bilateral pleural effusions/pulm edema 


Sepsis


Severe Acute gallstone pancreatitis (not a surgical candidate at this time) with

necrosis


Acute kidney failure now requiring dialysis


Salpingitis


Gallstones (Calculus of gallbladder with acute cholecystitis without 

obstruction)


HTN 


Leukocytosis 


Hypoxia


Uterine fibroid


Intractable pain


Intractable nausea


Covid 19 negative. 


Acute on chronic anemia 


EEG: No seizure activity


ESRD on HD


Hyperglycemia





Plan:


Continue with supportive measures


encouraged as much activity as tolerated





History of Present Illness


History of Present Illness


Ms Tadeo is a 48yo F w/ PMHx HTN, prediabetes who presented to the emergency 

room with complaints of abdominal pain on 3/16/2020. Found with Lipase 61845, 

, , Bilirubin 1.4.


CT abdomen confirms pancreatic inflammation, peripancreatic fluid and 

inflammatory changes around the pancreas consistent with pancreatitis. 

Cholelithiasis and 1.4cm uterine fibroid as well as possible left salpingitis. 

Admitted for further care


GI, General surgery, ID, Pulm consulted.





3/17: PICC placed per IR. Renal US negative. Started on levophed. Repeat CT 

abdomen w/ necrosis; 3/18: Dialysis catheter per nephrology; 3/19: On BiPAP; 

3/20: BiPAP, dialysis; 3/21: Overnight Tmax 101.7 , still on BiPAP FiO2 40%, 

still on low dose Levophed gtt, TPN initiated. On dialysis


4/6: Tracheostomy; 4/12: S/p tracheostomy on vent spontaneous respirations with 

5 of pressure support 35% FiO2, rectal tube and a [place], off pressors; 

4/14:Still on vent via trach. Removed PICC and CVC LIJ and replaced. CT 

chest/abd/pelvis with bilateral pleural effusion and ascites.


4/15: Renal function stable. Still on vent. More interactive today. Miming wish 

for food. Plan discussed for thoracentesis/paracentesis with daughters today. 

They were under impression patient was doing worse due to a miscommunication 

which has been clarified over the phone. 4.3L removed.


4/17: Febrile overnight 101.8F. More interactive, still on vent. Asking for ice 

by miming; 4/18: Afebrile overnight. TMax last 24 hours 100.6F. Hb 7.1. 

Interactive when awake. 4/22: Transfusion 1u PRBC (6U total since admit)


4/23-4/26: TPN and precedex, vent.


4/27: Tmax 101F overnight. Hb 8.2. HD cath out since 4/24. Alert. On vent SIMV 

35% FiO2. Surgery: ex-lap, no naif or pancreatic necrosectomy 2/2 profound 

inflammation.


4/28: Seen POD #1. Afebrile overnight. BUN 62. CBC WNL today.Remains on vent via

trach, TPN. Able to point today and indicate she wishes her daughters and Rolf 

to be involved in her care.


4/29: Hb 7.4, Na 151. Remains on vent via trach, TPN. off HD for now. Not 

tolerating trach shield well.


4/30: Negative US for UE DVT. More relaxed today. Has some increase in UOP. 

Tolerating vent well. She is requesting to eat and drink via writing. T max 100F

24 hours ago, but 101F at 1200. Right PICC placed. Speaking valve for half the 

day in [facility]


5/1: Na 153 today. Good UOP. Tmax 101F at 1200 on 4/30. She becomes a bit a

nxious and did not sleep much last night, wishes to try to sleep this morning


5/2: Able to speak well with speaking valve today. Na 153, K2.9, Mg 1.8, Cr 1. 

100.4F axillary temp overnight. Asking for food.


5/3: Afebrile overnight. ABG with pH 7.27/75/123. Hb 7.1. Na 153. PCA settings 

decreased


5/4: No acute events reported overnight, case discussed with nursing staff shan huffman in no acute distress no complaints during my visit


5/5: Patient responding to verbal stimuli.  She is saturating well and not 

requiring ventilation support, no acute events reported overnight seems to be 

slowly improving


5/6: Patient requiring transfusion of packed red blood cells due to anemia, no 

evidence of acute bleeding, appreciate GI consultant recommendations


5/7: Patient required ventilatory support given that she desaturated, she is 

lying in bed in mild distress, case discussed with nursing staff, no evidence of

bleeding, h an h noted. 


5/8: Underwent IR procedure as follows


BRIEF OPERATIVE NOTE


Pre-Op Diagnosis


Pancreatitis with pseudocysts, suspected infection


Post-Op Diagnosis


same


Procedure Performed


CT abdominal Drains x 3


Surgeon


Hardy


Anesthesia Type:  Conscious Sedation


Findings


3 abdominal drains, 14F, with turbid pancreatic fluid and necrotic debris in 

each.


Complications


No immediate





Plan:


vent support, wean if possible throughout the day 


Encourage activity as tolerated


Monitor fever curve, no obvious source of infection, possibly some aspiration 

events, atelectasis


will monitor for bleeding. 





5/9: Patient today somewhat restless and having bilious secretions from ET tube,

imaging studies ordered, discussed with consultant. Pretty poor prognosis, 

hopefully is not a fistula, poor surgical candidate. 





5/10: Imaging with no acute events, she seems more stable today compared to 

yesterday. Encouraged as much activity as possible patient at high risk for 

severe depression.





Vitals


Vitals





Vital Signs








  Date Time  Temp Pulse Resp B/P (MAP) Pulse Ox O2 Delivery O2 Flow Rate FiO2


 


5/10/20 12:37     98 Ventilator  


 


5/10/20 12:10   21     


 


5/10/20 06:00  101  142/85 (104)    


 


5/10/20 04:00 99.2       





 99.2       











Physical Exam


Physical Exam


GENERAL: Propped up in bed, appears weak, tired


HEENT:  oral cavity dry, NGT


NECK:  Trach/vent 


LUNGS: Improved aeration


HEART:  S1, S2, regular 


ABDOMEN: Distended, hypoactive BS, tender, + drains x 3


: Chino (4/14)


EXTREMITIES: Generalized edema, no cyanosis, SCDs bilaterally 


SKIN: Warm and dry.  No generalized rash.  


CNS: Nodded some to couple questions 


PICC(4/30) clean


General:  Alert, Oriented X3, Cooperative, No acute distress


Heart:  Regular rate, Normal S1, Normal S2, No murmurs, Gallops


Lungs:  Crackles


Abdomen:  Normal bowel sounds, Soft, No tenderness, No hepatosplenomegaly, No 

masses


Extremities:  No clubbing, No cyanosis, No edema, Normal pulses, No 

tenderness/swelling


Skin:  Other (mottling noted to extremities )





Labs


LABS





Laboratory Tests








Test


 5/9/20


13:34 5/9/20


18:32 5/10/20


00:54 5/10/20


06:15


 


Glucose (Fingerstick)


 182 mg/dL


(70-99) 139 mg/dL


(70-99) 156 mg/dL


(70-99) 





 


White Blood Count


 


 


 


 10.3 x10^3/uL


(4.0-11.0)


 


Red Blood Count


 


 


 


 2.53 x10^6/uL


(3.50-5.40)


 


Hemoglobin


 


 


 


 7.3 g/dL


(12.0-15.5)


 


Hematocrit


 


 


 


 22.5 %


(36.0-47.0)


 


Mean Corpuscular Volume    89 fL () 


 


Mean Corpuscular Hemoglobin    29 pg (25-35) 


 


Mean Corpuscular Hemoglobin


Concent 


 


 


 32 g/dL


(31-37)


 


Red Cell Distribution Width


 


 


 


 17.8 %


(11.5-14.5)


 


Platelet Count


 


 


 


 439 x10^3/uL


(140-400)


 


Sodium Level


 


 


 


 148 mmol/L


(136-145)


 


Potassium Level


 


 


 


 3.4 mmol/L


(3.5-5.1)


 


Chloride Level


 


 


 


 109 mmol/L


()


 


Carbon Dioxide Level


 


 


 


 31 mmol/L


(21-32)


 


Anion Gap    8 (6-14) 


 


Blood Urea Nitrogen


 


 


 


 30 mg/dL


(7-20)


 


Creatinine


 


 


 


 0.7 mg/dL


(0.6-1.0)


 


Estimated GFR


(Cockcroft-Gault) 


 


 


 88.9 





 


BUN/Creatinine Ratio    43 (6-20) 


 


Glucose Level


 


 


 


 135 mg/dL


(70-99)


 


Calcium Level


 


 


 


 8.2 mg/dL


(8.5-10.1)


 


Magnesium Level


 


 


 


 1.6 mg/dL


(1.8-2.4)


 


Total Bilirubin


 


 


 


 0.5 mg/dL


(0.2-1.0)


 


Aspartate Amino Transf


(AST/SGOT) 


 


 


 40 U/L (15-37) 





 


Alanine Aminotransferase


(ALT/SGPT) 


 


 


 41 U/L (14-59) 





 


Alkaline Phosphatase


 


 


 


 123 U/L


()


 


Total Protein


 


 


 


 5.4 g/dL


(6.4-8.2)


 


Albumin


 


 


 


 1.6 g/dL


(3.4-5.0)


 


Albumin/Globulin Ratio    0.4 (1.0-1.7) 


 


Test


 5/10/20


06:23 5/10/20


12:40 


 





 


Glucose (Fingerstick)


 131 mg/dL


(70-99) 149 mg/dL


(70-99) 


 














Assessment and Plan


Assessmemt and Plan


Problems


Medical Problems:


(1) Acute pancreatitis


Status: Acute  





(2) Cholelithiasis


Status: Acute  











Comment


Review of Relevant


I have reviewed the following items josy (where applicable) has been applied.


Labs





Laboratory Tests








Test


 5/8/20


18:13 5/9/20


00:18 5/9/20


06:00 5/9/20


13:34


 


Glucose (Fingerstick)


 150 mg/dL


(70-99) 159 mg/dL


(70-99) 139 mg/dL


(70-99) 182 mg/dL


(70-99)


 


Sodium Level


 


 


 149 mmol/L


(136-145) 





 


Potassium Level


 


 


 3.9 mmol/L


(3.5-5.1) 





 


Chloride Level


 


 


 111 mmol/L


() 





 


Carbon Dioxide Level


 


 


 31 mmol/L


(21-32) 





 


Anion Gap   7 (6-14)  


 


Blood Urea Nitrogen


 


 


 33 mg/dL


(7-20) 





 


Creatinine


 


 


 0.8 mg/dL


(0.6-1.0) 





 


Estimated GFR


(Cockcroft-Gault) 


 


 76.2 


 





 


Glucose Level


 


 


 145 mg/dL


(70-99) 





 


Calcium Level


 


 


 8.7 mg/dL


(8.5-10.1) 





 


Test


 5/9/20


18:32 5/10/20


00:54 5/10/20


06:15 5/10/20


06:23


 


Glucose (Fingerstick)


 139 mg/dL


(70-99) 156 mg/dL


(70-99) 


 131 mg/dL


(70-99)


 


White Blood Count


 


 


 10.3 x10^3/uL


(4.0-11.0) 





 


Red Blood Count


 


 


 2.53 x10^6/uL


(3.50-5.40) 





 


Hemoglobin


 


 


 7.3 g/dL


(12.0-15.5) 





 


Hematocrit


 


 


 22.5 %


(36.0-47.0) 





 


Mean Corpuscular Volume   89 fL ()  


 


Mean Corpuscular Hemoglobin   29 pg (25-35)  


 


Mean Corpuscular Hemoglobin


Concent 


 


 32 g/dL


(31-37) 





 


Red Cell Distribution Width


 


 


 17.8 %


(11.5-14.5) 





 


Platelet Count


 


 


 439 x10^3/uL


(140-400) 





 


Sodium Level


 


 


 148 mmol/L


(136-145) 





 


Potassium Level


 


 


 3.4 mmol/L


(3.5-5.1) 





 


Chloride Level


 


 


 109 mmol/L


() 





 


Carbon Dioxide Level


 


 


 31 mmol/L


(21-32) 





 


Anion Gap   8 (6-14)  


 


Blood Urea Nitrogen


 


 


 30 mg/dL


(7-20) 





 


Creatinine


 


 


 0.7 mg/dL


(0.6-1.0) 





 


Estimated GFR


(Cockcroft-Gault) 


 


 88.9 


 





 


BUN/Creatinine Ratio   43 (6-20)  


 


Glucose Level


 


 


 135 mg/dL


(70-99) 





 


Calcium Level


 


 


 8.2 mg/dL


(8.5-10.1) 





 


Magnesium Level


 


 


 1.6 mg/dL


(1.8-2.4) 





 


Total Bilirubin


 


 


 0.5 mg/dL


(0.2-1.0) 





 


Aspartate Amino Transf


(AST/SGOT) 


 


 40 U/L (15-37) 


 





 


Alanine Aminotransferase


(ALT/SGPT) 


 


 41 U/L (14-59) 


 





 


Alkaline Phosphatase


 


 


 123 U/L


() 





 


Total Protein


 


 


 5.4 g/dL


(6.4-8.2) 





 


Albumin


 


 


 1.6 g/dL


(3.4-5.0) 





 


Albumin/Globulin Ratio   0.4 (1.0-1.7)  


 


Test


 5/10/20


12:40 


 


 





 


Glucose (Fingerstick)


 149 mg/dL


(70-99) 


 


 











Laboratory Tests








Test


 5/9/20


13:34 5/9/20


18:32 5/10/20


00:54 5/10/20


06:15


 


Glucose (Fingerstick)


 182 mg/dL


(70-99) 139 mg/dL


(70-99) 156 mg/dL


(70-99) 





 


White Blood Count


 


 


 


 10.3 x10^3/uL


(4.0-11.0)


 


Red Blood Count


 


 


 


 2.53 x10^6/uL


(3.50-5.40)


 


Hemoglobin


 


 


 


 7.3 g/dL


(12.0-15.5)


 


Hematocrit


 


 


 


 22.5 %


(36.0-47.0)


 


Mean Corpuscular Volume    89 fL () 


 


Mean Corpuscular Hemoglobin    29 pg (25-35) 


 


Mean Corpuscular Hemoglobin


Concent 


 


 


 32 g/dL


(31-37)


 


Red Cell Distribution Width


 


 


 


 17.8 %


(11.5-14.5)


 


Platelet Count


 


 


 


 439 x10^3/uL


(140-400)


 


Sodium Level


 


 


 


 148 mmol/L


(136-145)


 


Potassium Level


 


 


 


 3.4 mmol/L


(3.5-5.1)


 


Chloride Level


 


 


 


 109 mmol/L


()


 


Carbon Dioxide Level


 


 


 


 31 mmol/L


(21-32)


 


Anion Gap    8 (6-14) 


 


Blood Urea Nitrogen


 


 


 


 30 mg/dL


(7-20)


 


Creatinine


 


 


 


 0.7 mg/dL


(0.6-1.0)


 


Estimated GFR


(Cockcroft-Gault) 


 


 


 88.9 





 


BUN/Creatinine Ratio    43 (6-20) 


 


Glucose Level


 


 


 


 135 mg/dL


(70-99)


 


Calcium Level


 


 


 


 8.2 mg/dL


(8.5-10.1)


 


Magnesium Level


 


 


 


 1.6 mg/dL


(1.8-2.4)


 


Total Bilirubin


 


 


 


 0.5 mg/dL


(0.2-1.0)


 


Aspartate Amino Transf


(AST/SGOT) 


 


 


 40 U/L (15-37) 





 


Alanine Aminotransferase


(ALT/SGPT) 


 


 


 41 U/L (14-59) 





 


Alkaline Phosphatase


 


 


 


 123 U/L


()


 


Total Protein


 


 


 


 5.4 g/dL


(6.4-8.2)


 


Albumin


 


 


 


 1.6 g/dL


(3.4-5.0)


 


Albumin/Globulin Ratio    0.4 (1.0-1.7) 


 


Test


 5/10/20


06:23 5/10/20


12:40 


 





 


Glucose (Fingerstick)


 131 mg/dL


(70-99) 149 mg/dL


(70-99) 


 











Microbiology


5/6/20 Aerobic and Anaerobic Culture, Resulted


         Pending


5/6/20 Anaerobic Culture Result 1 (ANSON), Resulted


         Pending


5/6/20 Aerobic Culture - Preliminary, Resulted


         


5/6/20 Aerobic Culture Result 1 (ANSON) - Preliminary, Resulted


         


5/6/20 Gram Stain - Final, Resulted


         


5/6/20 Gram Stain Result 1 (ANSON) - Final, Resulted


         


5/6/20 Gram Stain Result 2 (ANSON) - Final, Resulted


         


5/4/20 Blood Culture - Final, Complete


         NO GROWTH AFTER 5 DAYS


4/30/20 Aerobic Culture - Final, Complete


          


4/30/20 Aerobic Culture Result 1 (ANSON) - Final, Complete


          


4/30/20 Gram Stain - Final, Complete


          


4/30/20 Gram Stain Result 1 (ANSON) - Final, Complete


          


4/30/20 Gram Stain Result 2 (ANSON) - Final, Complete


          


4/12/20 Urine Culture - Final, Complete


          


4/12/20 Urine Culture Result 1 (ANSON) - Final, Complete


Medications





Current Medications


Sodium Chloride 1,000 ml @  1,000 mls/hr Q1H IV  Last administered on 3/16/20at 

03:00;  Start 3/16/20 at 03:00;  Stop 3/16/20 at 03:59;  Status DC


Ondansetron HCl (Zofran) 4 mg 1X  ONCE IVP  Last administered on 3/16/20at 

03:27;  Start 3/16/20 at 03:00;  Stop 3/16/20 at 03:01;  Status DC


Morphine Sulfate (Morphine Sulfate) 4 mg 1X  ONCE IV ;  Start 3/16/20 at 03:00; 

Stop 3/16/20 at 03:01;  Status Cancel


Ketorolac Tromethamine (Toradol 30mg Vial) 30 mg 1X  ONCE IV  Last administered 

on 3/16/20at 02:54;  Start 3/16/20 at 03:00;  Stop 3/16/20 at 03:01;  Status DC


Fentanyl Citrate (Fentanyl 2ml Vial) 25 mcg 1X  ONCE IVP  Last administered on 

3/16/20at 03:23;  Start 3/16/20 at 03:30;  Stop 3/16/20 at 03:31;  Status DC


Fentanyl Citrate (Fentanyl 2ml Vial) 100 mcg STK-MED ONCE .ROUTE ;  Start 

3/16/20 at 03:18;  Stop 3/16/20 at 03:18;  Status DC


Iohexol (Omnipaque 350 Mg/ml) 90 ml 1X  ONCE IV  Last administered on 3/16/20at 

03:25;  Start 3/16/20 at 03:30;  Stop 3/16/20 at 03:31;  Status DC


Info (CONTRAST GIVEN -- Rx MONITORING) 1 each PRN DAILY  PRN MC SEE COMMENTS;  

Start 3/16/20 at 03:30;  Stop 3/18/20 at 03:29;  Status DC


Hydromorphone HCl (Dilaudid) 0.5 mg 1X  ONCE IV  Last administered on 3/16/20at 

03:55;  Start 3/16/20 at 04:30;  Stop 3/16/20 at 04:32;  Status DC


Ondansetron HCl (Zofran) 4 mg PRN Q8HRS  PRN IV NAUSEA/VOMITING 1ST CHOICE;  

Start 3/16/20 at 05:00;  Stop 3/16/20 at 09:27;  Status DC


Morphine Sulfate (Morphine Sulfate) 2 mg PRN Q2HR  PRN IV SEVERE PAIN 7-10 Last 

administered on 3/17/20at 12:26;  Start 3/16/20 at 05:00;  Stop 3/17/20 at 

14:15;  Status DC


Sodium Chloride 1,000 ml @  125 mls/hr Q8H IV  Last administered on 3/16/20at 

20:56;  Start 3/16/20 at 05:00;  Stop 3/17/20 at 04:59;  Status DC


Hydromorphone HCl (Dilaudid) 0.5 mg PRN Q3HRS  PRN IV SEVERE PAIN 7-10 Last 

administered on 3/17/20at 10:06;  Start 3/16/20 at 05:00;  Stop 3/17/20 at 

12:01;  Status DC


Piperacillin Sod/ Tazobactam Sod 4.5 gm/Sodium Chloride 100 ml @  200 mls/hr 1X 

ONCE IV  Last administered on 3/16/20at 05:44;  Start 3/16/20 at 06:00;  Stop 

3/16/20 at 06:29;  Status DC


Ondansetron HCl (Zofran) 4 mg PRN Q4HRS  PRN IV NAUSEA/VOMITING 1ST CHOICE Last 

administered on 5/10/20at 06:50;  Start 3/16/20 at 09:30


Insulin Human Lispro (HumaLOG) 0-9 UNITS Q6HRS SQ  Last administered on 5/9/20at

13:36;  Start 3/16/20 at 09:30


Dextrose (Dextrose 50%-Water Syringe) 12.5 gm PRN Q15MIN  PRN IV SEE COMMENTS;  

Start 3/16/20 at 09:30


Pantoprazole Sodium (PROTONIX VIAL for IV PUSH) 40 mg DAILYAC IVP  Last 

administered on 5/10/20at 08:12;  Start 3/16/20 at 11:30


Prochlorperazine Edisylate (Compazine) 10 mg PRN Q6HRS  PRN IV NAUSEA/VOMITING, 

2nd CHOICE Last administered on 5/10/20at 02:57;  Start 3/16/20 at 17:45


Atenolol (Tenormin) 100 mg DAILY PO ;  Start 3/17/20 at 09:00;  Stop 3/16/20 at 

20:08;  Status DC


Metoprolol Tartrate (Lopressor Vial) 2.5 mg Q6HRS IVP  Last administered on 

3/17/20at 05:51;  Start 3/16/20 at 20:15;  Stop 3/17/20 at 10:02;  Status DC


Metoprolol Tartrate (Lopressor Vial) 5 mg Q6HRS IVP  Last administered on 3/26/

20at 00:12;  Start 3/17/20 at 10:15;  Stop 3/28/20 at 08:48;  Status DC


Hydromorphone HCl (Dilaudid) 1 mg PRN Q3HRS  PRN IV SEVERE PAIN 7-10 Last 

administered on 3/23/20at 05:13;  Start 3/17/20 at 12:00;  Stop 3/31/20 at 

00:25;  Status DC


Lidocaine HCl (Buffered Lidocaine 1%) 3 ml STK-MED ONCE .ROUTE ;  Start 3/17/20 

at 12:55;  Stop 3/17/20 at 12:56;  Status DC


Albumin Human 500 ml @  125 mls/hr 1X  ONCE IV  Last administered on 3/17/20at 

14:33;  Start 3/17/20 at 14:30;  Stop 3/17/20 at 18:32;  Status DC


Norepinephrine Bitartrate 8 mg/ Dextrose 258 ml @  17.299 mls/ hr CONT  PRN IV 

PER PROTOCOL Last administered on 4/14/20at 12:48;  Start 3/17/20 at 15:30;  

Stop 4/17/20 at 09:19;  Status DC


Sodium Chloride 1,000 ml @  125 mls/hr Q8H IV  Last administered on 3/17/20at 

21:04;  Start 3/17/20 at 16:00;  Stop 3/18/20 at 02:42;  Status DC


Albumin Human 500 ml @  125 mls/hr PRN BID  PRN IV After every 2L NSS & BP < 

90mm Last administered on 4/1/20at 14:21;  Start 3/17/20 at 16:00


Iohexol (Omnipaque 300 Mg/ml) 60 ml 1X  ONCE IV  Last administered on 3/17/20at 

17:20;  Start 3/17/20 at 17:00;  Stop 3/17/20 at 17:01;  Status DC


Info (CONTRAST GIVEN -- Rx MONITORING) 1 each PRN DAILY  PRN MC SEE COMMENTS;  

Start 3/17/20 at 17:00;  Stop 3/19/20 at 16:59;  Status DC


Meropenem 1 gm/ Sodium Chloride 100 ml @  200 mls/hr Q8HRS IV  Last administered

on 3/18/20at 05:45;  Start 3/17/20 at 20:00;  Stop 3/18/20 at 08:48;  Status DC


Furosemide (Lasix) 40 mg 1X  ONCE IVP  Last administered on 3/17/20at 22:12;  

Start 3/17/20 at 22:30;  Stop 3/17/20 at 22:31;  Status DC


Calcium Chloride 1000 mg/Sodium Chloride 110 ml @  220 mls/hr 1X  ONCE IV  Last 

administered on 3/17/20at 22:11;  Start 3/17/20 at 22:30;  Stop 3/17/20 at 

22:59;  Status DC


Albuterol Sulfate (Ventolin Neb Soln) 2.5 mg 1X  ONCE NEB  Last administered on 

3/18/20at 00:56;  Start 3/17/20 at 22:30;  Stop 3/17/20 at 22:31;  Status DC


Insulin Human Regular (HumuLIN R VIAL) 5 unit 1X  ONCE IV  Last administered on 

3/17/20at 22:14;  Start 3/17/20 at 22:30;  Stop 3/17/20 at 22:31;  Status DC


Magnesium Sulfate 50 ml @ 25 mls/hr 1X  ONCE IV  Last administered on 3/18/20at 

02:57;  Start 3/18/20 at 03:00;  Stop 3/18/20 at 04:59;  Status DC


Calcium Gluconate 1000 mg/Sodium Chloride 110 ml @  220 mls/hr 1X  ONCE IV  Last

administered on 3/18/20at 02:46;  Start 3/18/20 at 03:00;  Stop 3/18/20 at 

03:29;  Status DC


Sodium Chloride 1,000 ml @  200 mls/hr Q5H IV  Last administered on 3/18/20at 

02:46;  Start 3/18/20 at 03:00;  Stop 3/18/20 at 10:21;  Status DC


Calcium Gluconate 1000 mg/Sodium Chloride 110 ml @  220 mls/hr 1X  ONCE IV  Last

administered on 3/18/20at 03:21;  Start 3/18/20 at 03:30;  Stop 3/18/20 at 

03:59;  Status DC


Sodium Bicarbonate 50 meq/Sodium Chloride 1,050 ml @  75 mls/hr Q14H IV  Last 

administered on 3/22/20at 21:10;  Start 3/18/20 at 07:30;  Stop 3/23/20 at 

10:28;  Status DC


Calcium Gluconate 2000 mg/Sodium Chloride 120 ml @  220 mls/hr 1X  ONCE IV  Last

administered on 3/18/20at 09:05;  Start 3/18/20 at 07:30;  Stop 3/18/20 at 

08:02;  Status DC


Lidocaine HCl (Xylocaine-Mpf 1% 2ml Vial) 2 ml STK-MED ONCE .ROUTE ;  Start 

3/18/20 at 08:47;  Stop 3/18/20 at 08:47;  Status DC


Meropenem 500 mg/ Sodium Chloride 50 ml @  100 mls/hr Q12HR IV  Last 

administered on 3/23/20at 21:01;  Start 3/18/20 at 18:00;  Stop 3/24/20 at 

07:58;  Status DC


Lidocaine HCl (Buffered Lidocaine 1%) 3 ml STK-MED ONCE .ROUTE ;  Start 3/18/20 

at 09:46;  Stop 3/18/20 at 09:46;  Status DC


Lidocaine HCl (Buffered Lidocaine 1%) 6 ml 1X  ONCE INJ  Last administered on 

3/18/20at 10:26;  Start 3/18/20 at 10:15;  Stop 3/18/20 at 10:16;  Status DC


Info (Tpn Per Pharmacy) 1 each PRN DAILY  PRN MC SEE COMMENTS Last administered 

on 5/10/20at 12:57;  Start 3/18/20 at 12:00


Sodium Chloride 1,000 ml @  1,000 mls/hr Q1H PRN IV hypotension;  Start 3/18/20 

at 12:07;  Stop 3/18/20 at 18:06;  Status DC


Diphenhydramine HCl (Benadryl) 25 mg 1X PRN  PRN IV ITCHING;  Start 3/18/20 at 

12:15;  Stop 3/19/20 at 12:14;  Status DC


Diphenhydramine HCl (Benadryl) 25 mg 1X PRN  PRN IV ITCHING;  Start 3/18/20 at 

12:15;  Stop 3/19/20 at 12:14;  Status DC


Sodium Chloride 1,000 ml @  400 mls/hr Q2H30M PRN IV PATENCY;  Start 3/18/20 at 

12:07;  Stop 3/19/20 at 00:06;  Status DC


Info (PHARMACY MONITORING -- do not chart) 1 each PRN DAILY  PRN MC SEE 

COMMENTS;  Start 3/18/20 at 12:15;  Stop 3/20/20 at 08:13;  Status DC


Sodium Chloride 90 meq/Calcium Gluconate 10 meq/ Multivitamins 10 ml/Chromium/ 

Copper/Manganese/ Seleni/Zn 1 ml/ Total Parenteral Nutrition/Amino 

Acids/Dextrose/ Fat Emulsion Intravenous 55.005 ml  @ 2.292 mls/hr TPN  CONT IV 

;  Start 3/18/20 at 22:00;  Stop 3/18/20 at 12:33;  Status DC


Info (Tpn Per Pharmacy) 1 each PRN DAILY  PRN MC SEE COMMENTS;  Start 3/18/20 at

12:30;  Status UNV


Sodium Chloride 90 meq/Calcium Gluconate 10 meq/ Multivitamins 10 ml/Chromium/ 

Copper/Manganese/ Seleni/Zn 0.5 ml/ Total Parenteral Nutrition/Amino 

Acids/Dextrose/ Fat Emulsion Intravenous 1,512 ml @  63 mls/hr TPN  CONT IV  

Last administered on 3/18/20at 22:06;  Start 3/18/20 at 22:00;  Stop 3/19/20 at 

21:59;  Status DC


Calcium Carbonate/ Glycine (Tums) 500 mg PRN AFTMEALHC  PRN PO INDIGESTION;  

Start 3/18/20 at 17:45


Calcium Gluconate (Calcium Gluconate) 2,000 mg 1X  ONCE IVP  Last administered 

on 3/19/20at 02:19;  Start 3/19/20 at 02:15;  Stop 3/19/20 at 02:16;  Status DC


Calcium Chloride 3000 mg/Sodium Chloride 1,030 ml @  50 mls/hr [id number] IV  Last 

administered on 3/21/20at 02:17;  Start 3/19/20 at 08:00;  Stop 3/21/20 at 

15:23;  Status DC


Lorazepam (Ativan Inj) 1 mg PRN Q4HRS  PRN IVP ANXIETY / AGITATION, 2nd choic 

Last administered on 4/17/20at 03:51;  Start 3/19/20 at 09:00;  Stop 4/17/20 at 

09:19;  Status DC


Sodium Chloride 1,000 ml @  1,000 mls/hr Q1H PRN IV hypotension;  Start 3/19/20 

at 08:56;  Stop 3/19/20 at 14:55;  Status DC


Albumin Human 200 ml @  200 mls/hr 1X PRN  PRN IV Hypotension;  Start 3/19/20 at

09:00;  Stop 3/19/20 at 14:59;  Status DC


Diphenhydramine HCl (Benadryl) 25 mg 1X PRN  PRN IV ITCHING;  Start 3/19/20 at 0

9:00;  Stop 3/20/20 at 08:59;  Status DC


Diphenhydramine HCl (Benadryl) 25 mg 1X PRN  PRN IV ITCHING;  Start 3/19/20 at 

09:00;  Stop 3/20/20 at 08:59;  Status DC


Sodium Chloride 1,000 ml @  400 mls/hr Q2H30M PRN IV PATENCY;  Start 3/19/20 at 

08:56;  Stop 3/19/20 at 20:55;  Status DC


Info (PHARMACY MONITORING -- do not chart) 1 each PRN DAILY  PRN MC SEE 

COMMENTS;  Start 3/19/20 at 09:00;  Status UNV


Info (PHARMACY MONITORING -- do not chart) 1 each PRN DAILY  PRN MC SEE 

COMMENTS;  Start 3/19/20 at 09:00;  Stop 3/20/20 at 08:13;  Status DC


Digoxin (Lanoxin) 500 mcg 1X  ONCE IV  Last administered on 3/19/20at 10:04;  

Start 3/19/20 at 10:00;  Stop 3/19/20 at 10:01;  Status DC


Digoxin (Lanoxin) 125 mcg 1X  ONCE IV  Last administered on 3/19/20at 17:10;  

Start 3/19/20 at 18:00;  Stop 3/19/20 at 18:01;  Status DC


Magnesium Sulfate 100 ml @  25 mls/hr 1X  ONCE IV  Last administered on 3/19/

20at 12:48;  Start 3/19/20 at 13:00;  Stop 3/19/20 at 16:59;  Status DC


Sodium Chloride 90 meq/Magnesium Sulfate 10 meq/ Calcium Gluconate 20 meq/ 

Multivitamins 10 ml/Chromium/ Copper/Manganese/ Seleni/Zn 0.5 ml/ Total 

Parenteral Nutrition/Amino Acids/Dextrose/ Fat Emulsion Intravenous 1,512 ml @  

63 mls/hr TPN  CONT IV  Last administered on 3/19/20at 22:25;  Start 3/19/20 at 

22:00;  Stop 3/20/20 at 21:59;  Status DC


Sodium Chloride 1,000 ml @  1,000 mls/hr Q1H PRN IV hypotension;  Start 3/20/20 

at 08:05;  Stop 3/20/20 at 14:04;  Status DC


Albumin Human 200 ml @  200 mls/hr 1X  ONCE IV  Last administered on 3/20/20at 

08:57;  Start 3/20/20 at 08:15;  Stop 3/20/20 at 09:14;  Status DC


Diphenhydramine HCl (Benadryl) 25 mg 1X PRN  PRN IV ITCHING;  Start 3/20/20 at 

08:15;  Stop 3/21/20 at 08:14;  Status DC


Diphenhydramine HCl (Benadryl) 25 mg 1X PRN  PRN IV ITCHING;  Start 3/20/20 at 

08:15;  Stop 3/21/20 at 08:14;  Status DC


Sodium Chloride 1,000 ml @  400 mls/hr Q2H30M PRN IV PATENCY;  Start 3/20/20 at 

08:05;  Stop 3/20/20 at 20:04;  Status DC


Info (PHARMACY MONITORING -- do not chart) 1 each PRN DAILY  PRN MC SEE 

COMMENTS;  Start 3/20/20 at 08:15;  Stop 3/24/20 at 07:57;  Status DC


Sodium Chloride 90 meq/Potassium Chloride 15 meq/ Potassium Phosphate 10 mmol/ 

Magnesium Sulfate 10 meq/Calcium Gluconate 20 meq/ Multivitamins 10 ml/Chromium/

Copper/Manganese/ Seleni/Zn 0.5 ml/ Total Parenteral Nutrition/Amino 

Acids/Dextrose/ Fat Emulsion Intravenous 1,512 ml @  63 mls/hr TPN  CONT IV  

Last administered on 3/20/20at 21:01;  Start 3/20/20 at 22:00;  Stop 3/21/20 at 

21:59;  Status DC


Potassium Chloride/Water 100 ml @  100 mls/hr 1X  ONCE IV  Last administered on 

3/20/20at 14:09;  Start 3/20/20 at 14:00;  Stop 3/20/20 at 14:59;  Status DC


Benzocaine (Hurricaine One) 1 spray 1X  ONCE MM  Last administered on 3/20/20at 

16:38;  Start 3/20/20 at 14:30;  Stop 3/20/20 at 14:31;  Status DC


Lidocaine HCl (Glydo (Lidocaine) Jelly) 1 thomas 1X  ONCE MM  Last administered on 

3/20/20at 16:38;  Start 3/20/20 at 14:30;  Stop 3/20/20 at 14:31;  Status DC


Linezolid/Dextrose 300 ml @  300 mls/hr Q12HR IV  Last administered on 3/26/20at

21:04;  Start 3/20/20 at 20:00;  Stop 3/27/20 at 07:50;  Status DC


Acetaminophen (Tylenol) 650 mg PRN Q6HRS  PRN PO MILD PAIN / TEMP;  Start 

3/21/20 at 03:30;  Stop 3/21/20 at 03:36;  Status DC


Acetaminophen (Tylenol) 650 mg PRN Q6HRS  PRN PEG MILD PAIN / TEMP Last 

administered on 4/16/20at 19:56;  Start 3/21/20 at 03:36


Sodium Chloride 1,000 ml @  1,000 mls/hr Q1H PRN IV hypotension;  Start 3/21/20 

at 07:50;  Stop 3/21/20 at 13:49;  Status DC


Albumin Human 200 ml @  200 mls/hr 1X PRN  PRN IV Hypotension;  Start 3/21/20 at

08:00;  Stop 3/21/20 at 13:59;  Status DC


Sodium Chloride (Normal Saline Flush) 10 ml 1X PRN  PRN IV AP catheter pack;  

Start 3/21/20 at 08:00;  Stop 3/22/20 at 07:59;  Status DC


Sodium Chloride (Normal Saline Flush) 10 ml 1X PRN  PRN IV  catheter pack;  

Start 3/21/20 at 08:00;  Stop 3/22/20 at 07:59;  Status DC


Sodium Chloride 1,000 ml @  400 mls/hr Q2H30M PRN IV PATENCY;  Start 3/21/20 at 

07:50;  Stop 3/21/20 at 19:49;  Status DC


Info (PHARMACY MONITORING -- do not chart) 1 each PRN DAILY  PRN MC SEE 

COMMENTS;  Start 3/21/20 at 08:00;  Status UNV


Info (PHARMACY MONITORING -- do not chart) 1 each PRN DAILY  PRN MC SEE 

COMMENTS;  Start 3/21/20 at 08:00;  Stop 3/23/20 at 08:25;  Status DC


Sodium Chloride 90 meq/Potassium Chloride 15 meq/ Potassium Phosphate 10 mmol/ 

Magnesium Sulfate 10 meq/Calcium Gluconate 20 meq/ Multivitamins 10 ml/Chromium/

Copper/Manganese/ Seleni/Zn 0.5 ml/ Total Parenteral Nutrition/Amino 

Acids/Dextrose/ Fat Emulsion Intravenous 1,512 ml @  63 mls/hr TPN  CONT IV  

Last administered on 3/21/20at 20:57;  Start 3/21/20 at 22:00;  Stop 3/22/20 at 

21:59;  Status DC


Sodium Chloride 90 meq/Potassium Chloride 15 meq/ Potassium Phosphate 15 mmol/ 

Magnesium Sulfate 10 meq/Calcium Gluconate 20 meq/ Multivitamins 10 ml/Chromium/

Copper/Manganese/ Seleni/Zn 0.5 ml/ Total Parenteral Nutrition/Amino 

Acids/Dextrose/ Fat Emulsion Intravenous 1,512 ml @  63 mls/hr TPN  CONT IV ;  

Start 3/22/20 at 22:00;  Stop 3/22/20 at 14:16;  Status DC


Sodium Chloride 90 meq/Potassium Chloride 15 meq/ Potassium Phosphate 15 mmol/ 

Magnesium Sulfate 10 meq/Calcium Gluconate 20 meq/ Multivitamins 10 ml/Chromium/

Copper/Manganese/ Seleni/Zn 0.5 ml/ Total Parenteral Nutrition/Amino 

Acids/Dextrose/ Fat Emulsion Intravenous 1,200 ml @  50 mls/hr TPN  CONT IV ;  

Start 3/22/20 at 22:00;  Stop 3/22/20 at 14:17;  Status DC


Sodium Chloride 90 meq/Potassium Chloride 15 meq/ Potassium Phosphate 10 mmol/ 

Magnesium Sulfate 10 meq/Calcium Gluconate 20 meq/ Multivitamins 10 ml/Chromium/

Copper/Manganese/ Seleni/Zn 0.5 ml/ Total Parenteral Nutrition/Amino 

Acids/Dextrose/ Fat Emulsion Intravenous 1,200 ml @  50 mls/hr TPN  CONT IV  

Last administered on 3/22/20at 23:29;  Start 3/22/20 at 22:00;  Stop 3/23/20 at 

21:59;  Status DC


Sodium Chloride 1,000 ml @  1,000 mls/hr Q1H PRN IV hypotension;  Start 3/23/20 

at 07:28;  Stop 3/23/20 at 13:27;  Status DC


Albumin Human 200 ml @  200 mls/hr 1X  ONCE IV  Last administered on 3/23/20at 

08:51;  Start 3/23/20 at 07:30;  Stop 3/23/20 at 08:29;  Status DC


Diphenhydramine HCl (Benadryl) 25 mg 1X PRN  PRN IV ITCHING;  Start 3/23/20 at 

07:30;  Stop 3/24/20 at 07:29;  Status DC


Diphenhydramine HCl (Benadryl) 25 mg 1X PRN  PRN IV ITCHING;  Start 3/23/20 at 

07:30;  Stop 3/24/20 at 07:29;  Status DC


Sodium Chloride 1,000 ml @  400 mls/hr Q2H30M PRN IV PATENCY;  Start 3/23/20 at 

07:28;  Stop 3/23/20 at 19:27;  Status DC


Info (PHARMACY MONITORING -- do not chart) 1 each PRN DAILY  PRN MC SEE 

COMMENTS;  Start 3/23/20 at 07:30;  Stop 4/3/20 at 13:01;  Status DC


Metronidazole 100 ml @  100 mls/hr Q6HRS IV  Last administered on 4/8/20at 

06:26;  Start 3/23/20 at 08:30;  Stop 4/8/20 at 09:58;  Status DC


Micafungin Sodium 100 mg/Dextrose 100 ml @  100 mls/hr Q24H IV  Last 

administered on 4/30/20at 08:18;  Start 3/23/20 at 09:00;  Stop 4/30/20 at 

20:58;  Status DC


Propofol 0 ml @ As Directed STK-MED ONCE IV ;  Start 3/23/20 at 07:53;  Stop 

3/23/20 at 07:53;  Status DC


Etomidate (Amidate) 20 mg STK-MED ONCE IV ;  Start 3/23/20 at 07:53;  Stop 

3/23/20 at 07:54;  Status DC


Midazolam HCl (Versed) 5 mg STK-MED ONCE .ROUTE ;  Start 3/23/20 at 07:57;  Stop

3/23/20 at 07:57;  Status DC


Fentanyl Citrate 30 ml @ 0 mls/hr CONT  PRN IV SEE PROTOCOL Last administered on

4/17/20at 06:12;  Start 3/23/20 at 08:15;  Stop 4/17/20 at 09:19;  Status DC


Artificial Tears (Artificial Tears) 1 drop PRN Q1HR  PRN OU DRY EYE, 1st choice;

 Start 3/23/20 at 08:15;  Stop 4/29/20 at 05:31;  Status DC


Midazolam HCl 50 mg/Sodium Chloride 50 ml @ 0 mls/hr CONT  PRN IV SEE PROTOCOL 

Last administered on 3/26/20at 22:39;  Start 3/23/20 at 08:15;  Stop 3/28/20 at 

15:59;  Status DC


Etomidate (Amidate) 8 mg 1X  ONCE IV  Last administered on 3/23/20at 08:33;  

Start 3/23/20 at 08:30;  Stop 3/23/20 at 08:31;  Status DC


Succinylcholine Chloride (Anectine) 120 mg 1X  ONCE IV  Last administered on 

3/23/20at 08:34;  Start 3/23/20 at 08:30;  Stop 3/23/20 at 08:31;  Status DC


Midazolam HCl (Versed) 5 mg 1X  ONCE IV ;  Start 3/23/20 at 08:30;  Stop 3/23/20

at 08:31;  Status DC


Potassium Chloride 15 meq/ Bicarbonate Dialysis Soln w/ out KCl 5,007.5 ml  @ 

1,000 mls/ hr Q5H1M IV  Last administered on 3/24/20at 11:11;  Start 3/23/20 at 

12:00;  Stop 3/24/20 at 11:15;  Status DC


Potassium Chloride 15 meq/ Bicarbonate Dialysis Soln w/ out KCl 5,007.5 ml  @ 

1,000 mls/ hr Q5H1M IV  Last administered on 3/24/20at 11:12;  Start 3/23/20 at 

12:00;  Stop 3/24/20 at 11:17;  Status DC


Potassium Chloride 15 meq/ Bicarbonate Dialysis Soln w/ out KCl 5,007.5 ml  @ 

1,000 mls/ hr Q5H1M IV  Last administered on 3/24/20at 11:11;  Start 3/23/20 at 

12:00;  Stop 3/24/20 at 11:19;  Status DC


Sodium Chloride 90 meq/Potassium Chloride 15 meq/ Potassium Phosphate 10 mmol/ 

Magnesium Sulfate 10 meq/Calcium Gluconate 20 meq/ Multivitamins 10 ml/Chromium/

Copper/Manganese/ Seleni/Zn 0.5 ml/ Total Parenteral Nutrition/Amino 

Acids/Dextrose/ Fat Emulsion Intravenous 1,400 ml @  58.333 mls/ hr TPN  CONT IV

 Last administered on 3/23/20at 21:42;  Start 3/23/20 at 22:00;  Stop 3/24/20 at

21:59;  Status DC


Heparin Sodium (Porcine) (Heparin Sodium) 5,000 unit Q8HRS SQ  Last administered

on 3/28/20at 05:55;  Start 3/23/20 at 15:00;  Stop 3/28/20 at 13:28;  Status DC


Meropenem 500 mg/ Sodium Chloride 50 ml @  100 mls/hr Q6HRS IV  Last 

administered on 3/25/20at 06:00;  Start 3/24/20 at 09:00;  Stop 3/25/20 at 

07:29;  Status DC


Potassium Phosphate 20 mmol/ Sodium Chloride 106.6667 ml @  51.667 m... 1X  ONCE

IV  Last administered on 3/24/20at 11:22;  Start 3/24/20 at 10:15;  Stop 3/24/20

at 12:18;  Status DC


Acetaminophen (Tylenol Supp) 650 mg PRN Q6HRS  PRN OH MILD PAIN / TEMP > 100.3'F

Last administered on 5/5/20at 09:12;  Start 3/24/20 at 10:30


Potassium Chloride/Water 100 ml @  100 mls/hr Q1H IV  Last administered on 

3/24/20at 12:12;  Start 3/24/20 at 11:00;  Stop 3/24/20 at 12:59;  Status DC


Potassium Chloride 20 meq/ Bicarbonate Dialysis Soln w/ out KCl 5,010 ml @  

1,000 mls/hr Q5H1M IV  Last administered on 3/25/20at 08:48;  Start 3/24/20 at 

12:00;  Stop 3/25/20 at 13:03;  Status DC


Potassium Chloride 20 meq/ Bicarbonate Dialysis Soln w/ out KCl 5,010 ml @  

1,000 mls/hr Q5H1M IV  Last administered on 3/29/20at 14:52;  Start 3/24/20 at 

11:30;  Stop 3/29/20 at 19:59;  Status DC


Potassium Chloride 20 meq/ Bicarbonate Dialysis Soln w/ out KCl 5,010 ml @  

1,000 mls/hr Q5H1M IV  Last administered on 3/29/20at 14:53;  Start 3/24/20 at 

11:30;  Stop 3/29/20 at 19:59;  Status DC


Sodium Chloride 90 meq/Potassium Chloride 15 meq/ Potassium Phosphate 15 mmol/ 

Magnesium Sulfate 10 meq/Calcium Gluconate 15 meq/ Multivitamins 10 ml/Chromium/

Copper/Manganese/ Seleni/Zn 0.5 ml/ Total Parenteral Nutrition/Amino 

Acids/Dextrose/ Fat Emulsion Intravenous 1,400 ml @  58.333 mls/ hr TPN  CONT IV

 Last administered on 3/24/20at 22:17;  Start 3/24/20 at 22:00;  Stop 3/25/20 at

21:59;  Status DC


Cefepime HCl (Maxipime) 2 gm Q12HR IVP  Last administered on 4/7/20at 20:56;  

Start 3/25/20 at 09:00;  Stop 4/8/20 at 09:58;  Status DC


Daptomycin 500 mg/ Sodium Chloride 50 ml @  100 mls/hr Q48H IV  Last 

administered on 4/10/20at 09:57;  Start 3/25/20 at 08:30;  Stop 4/10/20 at 

10:07;  Status DC


Lidocaine HCl (Buffered Lidocaine 1%) 3 ml 1X  ONCE INJ  Last administered on 3/

25/20at 10:27;  Start 3/25/20 at 10:30;  Stop 3/25/20 at 10:31;  Status DC


Potassium Phosphate 20 mmol/ Sodium Chloride 106.6667 ml @  51.667 m... 1X  ONCE

IV  Last administered on 3/25/20at 12:51;  Start 3/25/20 at 13:00;  Stop 3/25/20

at 15:03;  Status DC


Sodium Chloride 90 meq/Potassium Chloride 15 meq/ Potassium Phosphate 18 mmol/ 

Magnesium Sulfate 8 meq/Calcium Gluconate 15 meq/ Multivitamins 10 ml/Chromium/ 

Copper/Manganese/ Seleni/Zn 0.5 ml/ Total Parenteral Nutrition/Amino 

Acids/Dextrose/ Fat Emulsion Intravenous 1,400 ml @  58.333 mls/ hr TPN  CONT IV

 Last administered on 3/25/20at 22:16;  Start 3/25/20 at 22:00;  Stop 3/26/20 at

21:59;  Status DC


Potassium Chloride 20 meq/ Bicarbonate Dialysis Soln w/ out KCl 5,010 ml @  

1,000 mls/hr Q5H1M IV  Last administered on 3/29/20at 14:54;  Start 3/25/20 at 

16:00;  Stop 3/29/20 at 19:59;  Status DC


Multi-Ingred Cream/Lotion/Oil/ Oint (Artificial Tears Eye Ointment) 1 thomas PRN 

Q1HR  PRN OU DRY EYE, 2nd choice Last administered on 4/13/20at 08:19;  Start 

3/25/20 at 17:30


Sodium Chloride 90 meq/Potassium Chloride 15 meq/ Potassium Phosphate 18 mmol/ 

Magnesium Sulfate 8 meq/Calcium Gluconate 15 meq/ Multivitamins 10 ml/Chromium/ 

Copper/Manganese/ Seleni/Zn 0.5 ml/ Total Parenteral Nutrition/Amino 

Acids/Dextrose/ Fat Emulsion Intravenous 1,400 ml @  58.333 mls/ hr TPN  CONT IV

 Last administered on 3/26/20at 22:00;  Start 3/26/20 at 22:00;  Stop 3/27/20 at

21:59;  Status DC


Albumin Human 500 ml @  125 mls/hr 1X  ONCE IV ;  Start 3/26/20 at 14:15;  Stop 

3/26/20 at 18:14;  Status DC


Sodium Chloride 90 meq/Potassium Chloride 15 meq/ Potassium Phosphate 18 mmol/ 

Magnesium Sulfate 8 meq/Calcium Gluconate 15 meq/ Multivitamins 10 ml/Chromium/ 

Copper/Manganese/ Seleni/Zn 0.5 ml/ Insulin Human Regular 10 unit/ Total Lisa

ral Nutrition/Amino Acids/Dextrose/ Fat Emulsion Intravenous 1,400 ml @  58.333 

mls/ hr TPN  CONT IV  Last administered on 3/27/20at 21:43;  Start 3/27/20 at 

22:00;  Stop 3/28/20 at 21:59;  Status DC


Lidocaine HCl (Buffered Lidocaine 1%) 3 ml STK-MED ONCE .ROUTE ;  Start 3/25/20 

at 10:00;  Stop 3/27/20 at 13:57;  Status DC


Midazolam HCl 100 mg/Sodium Chloride 100 ml @ 7 mls/hr CONT  PRN IV SEE PROTOCOL

Last administered on 4/8/20at 15:35;  Start 3/28/20 at 16:00


Sodium Chloride 90 meq/Potassium Chloride 15 meq/ Potassium Phosphate 18 mmol/ 

Magnesium Sulfate 8 meq/Calcium Gluconate 15 meq/ Multivitamins 10 ml/Chromium/ 

Copper/Manganese/ Seleni/Zn 0.5 ml/ Insulin Human Regular 15 unit/ Total 

Parenteral Nutrition/Amino Acids/Dextrose/ Fat Emulsion Intravenous 1,400 ml @  

58.333 mls/ hr TPN  CONT IV  Last administered on 3/28/20at 20:34;  Start 

3/28/20 at 22:00;  Stop 3/29/20 at 21:59;  Status DC


Info ([facility] Electrolyte Protocol) 1 ea CONT PRN  PRN MC PER PROTOCOL;  Start 

3/29/20 at 13:15


Sodium Chloride 90 meq/Potassium Chloride 15 meq/ Potassium Phosphate 18 mmol/ 

Magnesium Sulfate 8 meq/Calcium Gluconate 15 meq/ Multivitamins 10 ml/Chromium/ 

Copper/Manganese/ Seleni/Zn 0.5 ml/ Insulin Human Regular 15 unit/ Total 

Parenteral Nutrition/Amino Acids/Dextrose/ Fat Emulsion Intravenous 1,400 ml @  

58.333 mls/ hr TPN  CONT IV  Last administered on 3/29/20at 22:05;  Start 

3/29/20 at 22:00;  Stop 3/30/20 at 21:59;  Status DC


Potassium Chloride 15 meq/ Bicarbonate Dialysis Soln w/ out KCl 5,007.5 ml  @ 

1,000 mls/ hr Q5H1M IV  Last administered on 4/1/20at 18:14;  Start 3/29/20 at 

20:00;  Stop 4/2/20 at 13:08;  Status DC


Potassium Chloride 15 meq/ Bicarbonate Dialysis Soln w/ out KCl 5,007.5 ml  @ 

1,000 mls/ hr Q5H1M IV  Last administered on 4/1/20at 18:14;  Start 3/29/20 at 

20:00;  Stop 4/2/20 at 13:08;  Status DC


Potassium Chloride 15 meq/ Bicarbonate Dialysis Soln w/ out KCl 5,007.5 ml  @ 

1,000 mls/ hr Q5H1M IV  Last administered on 4/1/20at 18:14;  Start 3/29/20 at 

20:00;  Stop 4/2/20 at 13:08;  Status DC


Iohexol (Omnipaque 240 Mg/ml) 30 ml 1X  ONCE PO  Last administered on 3/30/20at 

11:30;  Start 3/30/20 at 11:30;  Stop 3/30/20 at 11:33;  Status DC


Info (CONTRAST GIVEN -- Rx MONITORING) 1 each PRN DAILY  PRN MC SEE COMMENTS;  

Start 3/30/20 at 11:45;  Stop 4/1/20 at 11:44;  Status DC


Sodium Chloride 90 meq/Potassium Chloride 15 meq/ Potassium Phosphate 18 mmol/ 

Magnesium Sulfate 8 meq/Calcium Gluconate 15 meq/ Multivitamins 10 ml/Chromium/ 

Copper/Manganese/ Seleni/Zn 0.5 ml/ Insulin Human Regular 15 unit/ Total 

Parenteral Nutrition/Amino Acids/Dextrose/ Fat Emulsion Intravenous 1,400 ml @  

58.333 mls/ hr TPN  CONT IV  Last administered on 3/30/20at 21:47;  Start 

3/30/20 at 22:00;  Stop 3/31/20 at 21:59;  Status DC


Sodium Chloride 90 meq/Potassium Chloride 15 meq/ Potassium Phosphate 18 mmol/ 

Magnesium Sulfate 8 meq/Calcium Gluconate 15 meq/ Multivitamins 10 ml/Chromium/ 

Copper/Manganese/ Seleni/Zn 0.5 ml/ Insulin Human Regular 20 unit/ Total 

Parenteral Nutrition/Amino Acids/Dextrose/ Fat Emulsion Intravenous 1,400 ml @  

58.333 mls/ hr TPN  CONT IV  Last administered on 3/31/20at 21:36;  Start 

3/31/20 at 22:00;  Stop 4/1/20 at 21:59;  Status DC


Alteplase, Recombinant (Cathflo For Central Catheter Clearance) 1 mg 1X  ONCE 

INT CAT  Last administered on 3/31/20at 20:03;  Start 3/31/20 at 19:30;  Stop 

3/31/20 at 19:46;  Status DC


Alteplase, Recombinant (Cathflo For Central Catheter Clearance) 1 mg 1X  ONCE I

NT CAT  Last administered on 3/31/20at 22:05;  Start 3/31/20 at 22:00;  Stop 

3/31/20 at 22:01;  Status DC


Sodium Chloride 90 meq/Potassium Chloride 15 meq/ Potassium Phosphate 18 mmol/ 

Magnesium Sulfate 8 meq/Calcium Gluconate 15 meq/ Multivitamins 10 ml/Chromium/ 

Copper/Manganese/ Seleni/Zn 0.5 ml/ Insulin Human Regular 20 unit/ Total 

Parenteral Nutrition/Amino Acids/Dextrose/ Fat Emulsion Intravenous 1,400 ml @  

58.333 mls/ hr TPN  CONT IV  Last administered on 4/1/20at 21:30;  Start 4/1/20 

at 22:00;  Stop 4/2/20 at 21:59;  Status DC


Dexmedetomidine HCl 400 mcg/ Sodium Chloride 100 ml @ 0 mls/hr CONT  PRN IV 

ANXIETY / AGITATION Last administered on 5/10/20at 09:38;  Start 4/2/20 at 08:15


Sodium Chloride 500 ml @  500 mls/hr 1X PRN  PRN IV ELEVATED BP, SEE COMMENTS;  

Start 4/2/20 at 08:15


Atropine Sulfate (ATROPINE 0.5mg SYRINGE) 0.5 mg PRN Q5MIN  PRN IV SEE COMMENTS;

 Start 4/2/20 at 08:15


Furosemide (Lasix) 20 mg 1X  ONCE IVP  Last administered on 4/2/20at 08:19;  

Start 4/2/20 at 08:15;  Stop 4/2/20 at 08:16;  Status DC


Lidocaine HCl (Buffered Lidocaine 1%) 3 ml STK-MED ONCE .ROUTE ;  Start 4/2/20 

at 08:39;  Stop 4/2/20 at 08:39;  Status DC


Lidocaine HCl (Buffered Lidocaine 1%) 6 ml 1X  ONCE INJ  Last administered on 

4/2/20at 09:05;  Start 4/2/20 at 09:00;  Stop 4/2/20 at 09:06;  Status DC


Sodium Chloride 90 meq/Potassium Chloride 15 meq/ Potassium Phosphate 18 mmol/ 

Magnesium Sulfate 8 meq/Calcium Gluconate 15 meq/ Multivitamins 10 ml/Chromium/ 

Copper/Manganese/ Seleni/Zn 0.5 ml/ Insulin Human Regular 20 unit/ Total 

Parenteral Nutrition/Amino Acids/Dextrose/ Fat Emulsion Intravenous 1,400 ml @  

58.333 mls/ hr TPN  CONT IV  Last administered on 4/2/20at 22:45;  Start 4/2/20 

at 22:00;  Stop 4/3/20 at 21:59;  Status DC


Sodium Chloride 1,000 ml @  1,000 mls/hr Q1H PRN IV hypotension;  Start 4/3/20 

at 07:30;  Stop 4/3/20 at 13:29;  Status DC


Albumin Human 200 ml @  200 mls/hr 1X PRN  PRN IV Hypotension Last administered 

on 4/3/20at 09:36;  Start 4/3/20 at 07:30;  Stop 4/3/20 at 13:29;  Status DC


Sodium Chloride (Normal Saline Flush) 10 ml 1X PRN  PRN IV AP catheter pack;  

Start 4/3/20 at 07:30;  Stop 4/3/20 at 21:29;  Status DC


Sodium Chloride (Normal Saline Flush) 10 ml 1X PRN  PRN IV  catheter pack;  

Start 4/3/20 at 07:30;  Stop 4/4/20 at 07:29;  Status DC


Sodium Chloride 1,000 ml @  400 mls/hr Q2H30M PRN IV PATENCY;  Start 4/3/20 at 

07:30;  Stop 4/3/20 at 19:29;  Status DC


Info (PHARMACY MONITORING -- do not chart) 1 each PRN DAILY  PRN MC SEE 

COMMENTS;  Start 4/3/20 at 07:30;  Stop 4/3/20 at 13:02;  Status DC


Info (PHARMACY MONITORING -- do not chart) 1 each PRN DAILY  PRN MC SEE 

COMMENTS;  Start 4/3/20 at 07:30;  Stop 4/5/20 at 12:45;  Status DC


Sodium Chloride 90 meq/Potassium Chloride 15 meq/ Potassium Phosphate 10 mmol/ 

Magnesium Sulfate 8 meq/Calcium Gluconate 15 meq/ Multivitamins 10 ml/Chromium/ 

Copper/Manganese/ Seleni/Zn 0.5 ml/ Insulin Human Regular 25 unit/ Total 

Parenteral Nutrition/Amino Acids/Dextrose/ Fat Emulsion Intravenous 1,400 ml @  

58.333 mls/ hr TPN  CONT IV  Last administered on 4/3/20at 22:19;  Start 4/3/20 

at 22:00;  Stop 4/4/20 at 21:59;  Status DC


Heparin Sodium (Porcine) (Heparin Sodium) 5,000 unit Q12HR SQ  Last administered

on 4/26/20at 08:59;  Start 4/3/20 at 21:00;  Stop 4/26/20 at 10:05;  Status DC


Ondansetron HCl (Zofran) 4 mg PRN Q6HRS  PRN IV NAUSEA/VOMITING;  Start 4/6/20 

at 07:00;  Stop 4/7/20 at 06:59;  Status DC


Fentanyl Citrate (Fentanyl 2ml Vial) 25 mcg PRN Q5MIN  PRN IV MILD PAIN 1-3;  

Start 4/6/20 at 07:00;  Stop 4/7/20 at 06:59;  Status DC


Fentanyl Citrate (Fentanyl 2ml Vial) 50 mcg PRN Q5MIN  PRN IV MODERATE TO SEVERE

PAIN;  Start 4/6/20 at 07:00;  Stop 4/7/20 at 06:59;  Status DC


Ringer's Solution 1,000 ml @  30 mls/hr Q24H IV ;  Start 4/6/20 at 07:00;  Stop 

4/6/20 at 18:59;  Status DC


Lidocaine HCl (Xylocaine-Mpf 1% 2ml Vial) 2 ml PRN 1X  PRN ID PRIOR TO IV START;

 Start 4/6/20 at 07:00;  Stop 4/7/20 at 06:59;  Status DC


Prochlorperazine Edisylate (Compazine) 5 mg PACU PRN  PRN IV NAUSEA, MRX1;  

Start 4/6/20 at 07:00;  Stop 4/7/20 at 06:59;  Status DC


Sodium Chloride 1,000 ml @  1,000 mls/hr Q1H PRN IV hypotension;  Start 4/4/20 

at 09:10;  Stop 4/4/20 at 15:09;  Status DC


Albumin Human 200 ml @  200 mls/hr 1X PRN  PRN IV Hypotension Last administered 

on 4/4/20at 10:10;  Start 4/4/20 at 09:15;  Stop 4/4/20 at 15:14;  Status DC


Sodium Chloride 1,000 ml @  400 mls/hr Q2H30M PRN IV PATENCY;  Start 4/4/20 at 

09:10;  Stop 4/4/20 at 21:09;  Status DC


Info (PHARMACY MONITORING -- do not chart) 1 each PRN DAILY  PRN MC SEE 

COMMENTS;  Start 4/4/20 at 09:15;  Stop 4/5/20 at 12:45;  Status DC


Info (PHARMACY MONITORING -- do not chart) 1 each PRN DAILY  PRN MC SEE 

COMMENTS;  Start 4/4/20 at 09:15;  Stop 4/5/20 at 12:45;  Status DC


Sodium Chloride 90 meq/Potassium Chloride 15 meq/ Potassium Phosphate 10 mmol/ 

Magnesium Sulfate 8 meq/Calcium Gluconate 15 meq/ Multivitamins 10 ml/Chromium/ 

Copper/Manganese/ Seleni/Zn 0.5 ml/ Insulin Human Regular 25 unit/ Total P

arenteral Nutrition/Amino Acids/Dextrose/ Fat Emulsion Intravenous 1,400 ml @  

58.333 mls/ hr TPN  CONT IV  Last administered on 4/4/20at 22:10;  Start 4/4/20 

at 22:00;  Stop 4/5/20 at 21:59;  Status DC


Magnesium Sulfate 50 ml @ 25 mls/hr PRN DAILY  PRN IV for Mag < 1.7 on am labs 

Last administered on 4/20/20at 17:27;  Start 4/5/20 at 09:15


Sodium Chloride 90 meq/Potassium Chloride 15 meq/ Potassium Phosphate 10 mmol/ 

Magnesium Sulfate 8 meq/Calcium Gluconate 15 meq/ Multivitamins 10 ml/Chromium/ 

Copper/Manganese/ Seleni/Zn 0.5 ml/ Insulin Human Regular 25 unit/ Total 

Parenteral Nutrition/Amino Acids/Dextrose/ Fat Emulsion Intravenous 1,400 ml @  

58.333 mls/ hr TPN  CONT IV  Last administered on 4/5/20at 21:20;  Start 4/5/20 

at 22:00;  Stop 4/6/20 at 21:59;  Status DC


Sodium Chloride 1,000 ml @  1,000 mls/hr Q1H PRN IV hypotension;  Start 4/5/20 

at 12:23;  Stop 4/5/20 at 18:22;  Status DC


Albumin Human 200 ml @  200 mls/hr 1X  ONCE IV  Last administered on 4/5/20at 

13:34;  Start 4/5/20 at 12:30;  Stop 4/5/20 at 13:29;  Status DC


Diphenhydramine HCl (Benadryl) 25 mg 1X PRN  PRN IV ITCHING;  Start 4/5/20 at 

12:30;  Stop 4/6/20 at 12:29;  Status DC


Diphenhydramine HCl (Benadryl) 25 mg 1X PRN  PRN IV ITCHING;  Start 4/5/20 at 

12:30;  Stop 4/6/20 at 12:29;  Status DC


Info (PHARMACY MONITORING -- do not chart) 1 each PRN DAILY  PRN MC SEE 

COMMENTS;  Start 4/5/20 at 12:30;  Status Cancel


Bupivacaine HCl/ Epinephrine Bitart (Sensorcain-Epi 0.5%-1:103517 Mpf) 30 ml 

STK-MED ONCE .ROUTE  Last administered on 4/6/20at 11:44;  Start 4/6/20 at 

11:00;  Stop 4/6/20 at 11:01;  Status DC


Cellulose (Surgicel Fibrillar 1x2) 1 each STK-MED ONCE .ROUTE ;  Start 4/6/20 at

11:00;  Stop 4/6/20 at 11:01;  Status DC


Sodium Chloride 90 meq/Potassium Chloride 15 meq/ Potassium Phosphate 10 mmol/ 

Magnesium Sulfate 12 meq/Calcium Gluconate 15 meq/ Multivitamins 10 ml/Chromium/

Copper/Manganese/ Seleni/Zn 0.5 ml/ Insulin Human Regular 25 unit/ Total 

Parenteral Nutrition/Amino Acids/Dextrose/ Fat Emulsion Intravenous 1,400 ml @  

58.333 mls/ hr TPN  CONT IV  Last administered on 4/6/20at 22:24;  Start 4/6/20 

at 22:00;  Stop 4/7/20 at 21:59;  Status DC


Propofol 20 ml @ As Directed STK-MED ONCE IV ;  Start 4/6/20 at 11:07;  Stop 

4/6/20 at 11:07;  Status DC


Cellulose (Surgicel Hemostat 4x8) 1 each STK-MED ONCE .ROUTE  Last administered 

on 4/6/20at 11:44;  Start 4/6/20 at 11:55;  Stop 4/6/20 at 11:56;  Status DC


Sevoflurane (Ultane) 60 ml STK-MED ONCE IH ;  Start 4/6/20 at 12:46;  Stop 

4/6/20 at 12:46;  Status DC


Sodium Chloride 1,000 ml @  1,000 mls/hr Q1H PRN IV hypotension;  Start 4/6/20 

at 13:51;  Stop 4/6/20 at 19:50;  Status DC


Albumin Human 200 ml @  200 mls/hr 1X PRN  PRN IV Hypotension Last administered 

on 4/6/20at 14:51;  Start 4/6/20 at 14:00;  Stop 4/6/20 at 19:59;  Status DC


Diphenhydramine HCl (Benadryl) 25 mg 1X PRN  PRN IV ITCHING;  Start 4/6/20 at 

14:00;  Stop 4/7/20 at 13:59;  Status DC


Diphenhydramine HCl (Benadryl) 25 mg 1X PRN  PRN IV ITCHING;  Start 4/6/20 at 

14:00;  Stop 4/7/20 at 13:59;  Status DC


Sodium Chloride 1,000 ml @  400 mls/hr Q2H30M PRN IV PATENCY;  Start 4/6/20 at 

13:51;  Stop 4/7/20 at 01:50;  Status DC


Info (PHARMACY MONITORING -- do not chart) 1 each PRN DAILY  PRN MC SEE 

COMMENTS;  Start 4/6/20 at 14:00;  Stop 4/9/20 at 08:16;  Status DC


Heparin Sodium (Porcine) (Hep Lock Adult) 500 unit STK-MED ONCE IVP ;  Start 

4/7/20 at 09:29;  Stop 4/7/20 at 09:30;  Status DC


Sodium Chloride 1,000 ml @  1,000 mls/hr Q1H PRN IV hypotension;  Start 4/7/20 

at 10:43;  Stop 4/7/20 at 16:42;  Status DC


Sodium Chloride 1,000 ml @  400 mls/hr Q2H30M PRN IV PATENCY;  Start 4/7/20 at 

10:43;  Stop 4/7/20 at 22:42;  Status DC


Info (PHARMACY MONITORING -- do not chart) 1 each PRN DAILY  PRN MC SEE 

COMMENTS;  Start 4/7/20 at 10:45;  Status UNV


Info (PHARMACY MONITORING -- do not chart) 1 each PRN DAILY  PRN MC SEE 

COMMENTS;  Start 4/7/20 at 10:45;  Status UNV


Sodium Chloride 90 meq/Potassium Chloride 15 meq/ Magnesium Sulfate 12 

meq/Calcium Gluconate 15 meq/ Multivitamins 10 ml/Chromium/ Copper/Manganese/ 

Seleni/Zn 0.5 ml/ Insulin Human Regular 25 unit/ Total Parenteral 

Nutrition/Amino Acids/Dextrose/ Fat Emulsion Intravenous 1,400 ml @  58.333 mls/

hr TPN  CONT IV  Last administered on 4/7/20at 22:13;  Start 4/7/20 at 22:00;  

Stop 4/8/20 at 21:59;  Status DC


Sodium Chloride 1,000 ml @  1,000 mls/hr Q1H PRN IV hypotension;  Start 4/8/20 

at 07:50;  Stop 4/8/20 at 13:49;  Status DC


Albumin Human 200 ml @  200 mls/hr 1X  ONCE IV ;  Start 4/8/20 at 08:00;  Stop 

4/8/20 at 08:53;  Status DC


Diphenhydramine HCl (Benadryl) 25 mg 1X PRN  PRN IV ITCHING;  Start 4/8/20 at 

08:00;  Stop 4/9/20 at 07:59;  Status DC


Diphenhydramine HCl (Benadryl) 25 mg 1X PRN  PRN IV ITCHING;  Start 4/8/20 at 

08:00;  Stop 4/9/20 at 07:59;  Status DC


Info (PHARMACY MONITORING -- do not chart) 1 each PRN DAILY  PRN MC SEE 

COMMENTS;  Start 4/8/20 at 08:00;  Stop 4/9/20 at 08:16;  Status DC


Albumin Human 50 ml @ 50 mls/hr 1X  ONCE IV ;  Start 4/8/20 at 08:53;  Stop 

4/8/20 at 08:56;  Status DC


Albumin Human 200 ml @  50 mls/hr PRN 1X  PRN IV HYPOTENSION Last administered 

on 4/14/20at 11:54;  Start 4/8/20 at 09:00


Meropenem 500 mg/ Sodium Chloride 50 ml @  100 mls/hr Q12H IV  Last administered

on 4/28/20at 10:45;  Start 4/8/20 at 10:00;  Stop 4/28/20 at 12:37;  Status DC


Sodium Chloride 90 meq/Magnesium Sulfate 12 meq/ Calcium Gluconate 15 meq/ 

Multivitamins 10 ml/Chromium/ Copper/Manganese/ Seleni/Zn 0.5 ml/ Insulin Human 

Regular 25 unit/ Total Parenteral Nutrition/Amino Acids/Dextrose/ Fat Emulsion 

Intravenous 1,400 ml @  58.333 mls/ hr TPN  CONT IV  Last administered on 

4/8/20at 21:41;  Start 4/8/20 at 22:00;  Stop 4/9/20 at 21:59;  Status DC


Sodium Chloride 1,000 ml @  1,000 mls/hr Q1H PRN IV hypotension;  Start 4/9/20 

at 07:58;  Stop 4/9/20 at 13:57;  Status DC


Albumin Human 200 ml @  200 mls/hr 1X PRN  PRN IV Hypotension Last administered 

on 4/9/20at 09:30;  Start 4/9/20 at 08:00;  Stop 4/9/20 at 13:59;  Status DC


Sodium Chloride 1,000 ml @  400 mls/hr Q2H30M PRN IV PATENCY;  Start 4/9/20 at 

07:58;  Stop 4/9/20 at 19:57;  Status DC


Info (PHARMACY MONITORING -- do not chart) 1 each PRN DAILY  PRN MC SEE 

COMMENTS;  Start 4/9/20 at 08:00;  Status Cancel


Info (PHARMACY MONITORING -- do not chart) 1 each PRN DAILY  PRN MC SEE 

COMMENTS;  Start 4/9/20 at 08:15;  Status UNV


Sodium Chloride 90 meq/Potassium Phosphate 5 mmol/ Magnesium Sulfate 12 

meq/Calcium Gluconate 15 meq/ Multivitamins 10 ml/Chromium/ Copper/Manganese/ 

Seleni/Zn 0.5 ml/ Insulin Human Regular 30 unit/ Total Parenteral 

Nutrition/Amino Acids/Dextrose/ Fat Emulsion Intravenous 1,400 ml @  58.333 mls/

hr TPN  CONT IV  Last administered on 4/9/20at 22:08;  Start 4/9/20 at 22:00;  

Stop 4/10/20 at 21:59;  Status DC


Linezolid/Dextrose 300 ml @  300 mls/hr Q12HR IV  Last administered on 4/20/20at

20:40;  Start 4/10/20 at 11:00;  Stop 4/21/20 at 08:10;  Status DC


Sodium Chloride 90 meq/Potassium Phosphate 15 mmol/ Magnesium Sulfate 12 

meq/Calcium Gluconate 15 meq/ Multivitamins 10 ml/Chromium/ Copper/Manganese/ Se

aram/Zn 0.5 ml/ Insulin Human Regular 30 unit/ Total Parenteral Nutrition/Amino 

Acids/Dextrose/ Fat Emulsion Intravenous 1,400 ml @  58.333 mls/ hr TPN  CONT IV

 Last administered on 4/10/20at 21:49;  Start 4/10/20 at 22:00;  Stop 4/11/20 at

21:59;  Status DC


Sodium Chloride 90 meq/Potassium Phosphate 15 mmol/ Magnesium Sulfate 12 

meq/Calcium Gluconate 15 meq/ Multivitamins 10 ml/Chromium/ Copper/Manganese/ 

Seleni/Zn 0.5 ml/ Insulin Human Regular 40 unit/ Total Parenteral 

Nutrition/Amino Acids/Dextrose/ Fat Emulsion Intravenous 1,400 ml @  58.333 mls/

hr TPN  CONT IV  Last administered on 4/11/20at 21:21;  Start 4/11/20 at 22:00; 

Stop 4/12/20 at 21:59;  Status DC


Sodium Chloride 1,000 ml @  1,000 mls/hr Q1H PRN IV hypotension;  Start 4/11/20 

at 13:26;  Stop 4/11/20 at 19:25;  Status DC


Albumin Human 200 ml @  200 mls/hr 1X PRN  PRN IV Hypotension Last administered 

on 4/11/20at 15:00;  Start 4/11/20 at 13:30;  Stop 4/11/20 at 19:29;  Status DC


Sodium Chloride (Normal Saline Flush) 10 ml 1X PRN  PRN IV AP catheter pack;  

Start 4/11/20 at 13:30;  Stop 4/12/20 at 13:29;  Status DC


Sodium Chloride (Normal Saline Flush) 10 ml 1X PRN  PRN IV  catheter pack;  

Start 4/11/20 at 13:30;  Stop 4/12/20 at 13:29;  Status DC


Sodium Chloride 1,000 ml @  400 mls/hr Q2H30M PRN IV PATENCY;  Start 4/11/20 at 

13:26;  Stop 4/12/20 at 01:25;  Status DC


Info (PHARMACY MONITORING -- do not chart) 1 each PRN DAILY  PRN MC SEE 

COMMENTS;  Start 4/11/20 at 13:30;  Stop 4/11/20 at 13:33;  Status DC


Info (PHARMACY MONITORING -- do not chart) 1 each PRN DAILY  PRN MC SEE 

COMMENTS;  Start 4/11/20 at 13:30;  Stop 4/11/20 at 13:34;  Status DC


Sodium Chloride 90 meq/Potassium Phosphate 19 mmol/ Magnesium Sulfate 12 

meq/Calcium Gluconate 15 meq/ Multivitamins 10 ml/Chromium/ Copper/Manganese/ 

Seleni/Zn 0.5 ml/ Insulin Human Regular 40 unit/ Total Parenteral 

Nutrition/Amino Acids/Dextrose/ Fat Emulsion Intravenous 1,400 ml @  58.333 mls/

hr TPN  CONT IV  Last administered on 4/12/20at 21:54;  Start 4/12/20 at 22:00; 

Stop 4/13/20 at 21:59;  Status DC


Sodium Chloride 1,000 ml @  1,000 mls/hr Q1H PRN IV hypotension;  Start 4/13/20 

at 09:35;  Stop 4/13/20 at 15:34;  Status DC


Albumin Human 200 ml @  200 mls/hr 1X PRN  PRN IV Hypotension;  Start 4/13/20 at

09:45;  Stop 4/13/20 at 15:44;  Status DC


Diphenhydramine HCl (Benadryl) 25 mg 1X PRN  PRN IV ITCHING;  Start 4/13/20 at 

09:45;  Stop 4/14/20 at 09:44;  Status DC


Diphenhydramine HCl (Benadryl) 25 mg 1X PRN  PRN IV ITCHING;  Start 4/13/20 at 

09:45;  Stop 4/14/20 at 09:44;  Status DC


Sodium Chloride 1,000 ml @  400 mls/hr Q2H30M PRN IV PATENCY;  Start 4/13/20 at 

09:35;  Stop 4/13/20 at 21:34;  Status DC


Info (PHARMACY MONITORING -- do not chart) 1 each PRN DAILY  PRN MC SEE 

COMMENTS;  Start 4/13/20 at 09:45;  Status Cancel


Sodium Chloride 100 meq/Potassium Phosphate 19 mmol/ Magnesium Sulfate 12 

meq/Calcium Gluconate 15 meq/ Multivitamins 10 ml/Chromium/ Copper/Manganese/ 

Seleni/Zn 0.5 ml/ Insulin Human Regular 40 unit/ Potassium Chloride 20 meq/ 

Total Parenteral Nutrition/Amino Acids/Dextrose/ Fat Emulsion Intravenous 1,400 

ml @  58.333 mls/ hr TPN  CONT IV  Last administered on 4/13/20at 22:02;  Start 

4/13/20 at 22:00;  Stop 4/14/20 at 21:59;  Status DC


Furosemide (Lasix) 40 mg 1X  ONCE IVP  Last administered on 4/13/20at 14:39;  

Start 4/13/20 at 14:30;  Stop 4/13/20 at 14:31;  Status DC


Metronidazole 100 ml @  100 mls/hr Q8HRS IV  Last administered on 4/21/20at 

06:04;  Start 4/14/20 at 10:00;  Stop 4/21/20 at 08:10;  Status DC


Sodium Chloride 1,000 ml @  1,000 mls/hr Q1H PRN IV hypotension;  Start 4/14/20 

at 08:00;  Stop 4/14/20 at 13:59;  Status DC


Albumin Human 200 ml @  200 mls/hr 1X PRN  PRN IV Hypotension;  Start 4/14/20 at

08:00;  Stop 4/14/20 at 13:59;  Status DC


Sodium Chloride 1,000 ml @  400 mls/hr Q2H30M PRN IV PATENCY;  Start 4/14/20 at 

08:00;  Stop 4/14/20 at 19:59;  Status DC


Info (PHARMACY MONITORING -- do not chart) 1 each PRN DAILY  PRN MC SEE 

COMMENTS;  Start 4/14/20 at 11:30;  Status UNV


Info (PHARMACY MONITORING -- do not chart) 1 each PRN DAILY  PRN MC SEE 

COMMENTS;  Start 4/14/20 at 11:30;  Stop 4/16/20 at 12:13;  Status DC


Sodium Chloride 100 meq/Potassium Phosphate 19 mmol/ Magnesium Sulfate 12 

meq/Calcium Gluconate 15 meq/ Multivitamins 10 ml/Chromium/ Copper/Manganese/ 

Seleni/Zn 0.5 ml/ Insulin Human Regular 40 unit/ Potassium Chloride 20 meq/ 

Total Parenteral Nutrition/Amino Acids/Dextrose/ Fat Emulsion Intravenous 1,400 

ml @  58.333 mls/ hr TPN  CONT IV  Last administered on 4/14/20at 21:52;  Start 

4/14/20 at 22:00;  Stop 4/15/20 at 21:59;  Status DC


Sodium Chloride (Normal Saline Flush) 10 ml QSHIFT  PRN IV AFTER MEDS AND BLOOD 

DRAWS;  Start 4/14/20 at 15:00


Sodium Chloride (Normal Saline Flush) 10 ml PRN Q5MIN  PRN IV AFTER MEDS AND 

BLOOD DRAWS;  Start 4/14/20 at 15:00


Sodium Chloride (Normal Saline Flush) 20 ml PRN Q5MIN  PRN IV AFTER MEDS AND 

BLOOD DRAWS;  Start 4/14/20 at 15:00


Sodium Chloride 100 meq/Potassium Phosphate 19 mmol/ Magnesium Sulfate 12 m

eq/Calcium Gluconate 15 meq/ Multivitamins 10 ml/Chromium/ Copper/Manganese/ 

Seleni/Zn 0.5 ml/ Insulin Human Regular 40 unit/ Potassium Chloride 20 meq/ 

Total Parenteral Nutrition/Amino Acids/Dextrose/ Fat Emulsion Intravenous 1,400 

ml @  58.333 mls/ hr TPN  CONT IV  Last administered on 4/15/20at 21:20;  Start 

4/15/20 at 22:00;  Stop 4/16/20 at 21:59;  Status DC


Lidocaine HCl (Buffered Lidocaine 1%) 3 ml STK-MED ONCE .ROUTE ;  Start 4/15/20 

at 13:16;  Stop 4/15/20 at 13:16;  Status DC


Lidocaine HCl (Buffered Lidocaine 1%) 6 ml 1X  ONCE INJ  Last administered on 

4/15/20at 13:45;  Start 4/15/20 at 13:30;  Stop 4/15/20 at 13:31;  Status DC


Albumin Human 100 ml @  100 mls/hr 1X  ONCE IV  Last administered on 4/15/20at 

15:41;  Start 4/15/20 at 15:00;  Stop 4/15/20 at 15:59;  Status DC


Albumin Human 50 ml @ 50 mls/hr 1X  ONCE IV  Last administered on 4/15/20at 

15:00;  Start 4/15/20 at 15:00;  Stop 4/15/20 at 15:59;  Status DC


Info (PHARMACY MONITORING -- do not chart) 1 each PRN DAILY  PRN MC SEE 

COMMENTS;  Start 4/16/20 at 11:30;  Status Cancel


Info (PHARMACY MONITORING -- do not chart) 1 each PRN DAILY  PRN MC SEE 

COMMENTS;  Start 4/16/20 at 11:30;  Status UNV


Sodium Chloride 100 meq/Potassium Phosphate 10 mmol/ Magnesium Sulfate 12 

meq/Calcium Gluconate 15 meq/ Multivitamins 10 ml/Chromium/ Copper/Manganese/ 

Seleni/Zn 0.5 ml/ Insulin Human Regular 35 unit/ Potassium Chloride 20 meq/ [place]ota

l Parenteral Nutrition/Amino Acids/Dextrose/ Fat Emulsion Intravenous 1,400 ml @

 58.333 mls/ hr TPN  CONT IV  Last administered on 4/16/20at 22:10;  Start 

4/16/20 at 22:00;  Stop 4/17/20 at 21:59;  Status DC


Sodium Chloride 100 meq/Potassium Phosphate 5 mmol/ Magnesium Sulfate 12 

meq/Calcium Gluconate 15 meq/ Multivitamins 10 ml/Chromium/ Copper/Manganese/ 

Seleni/Zn 0.5 ml/ Insulin Human Regular 35 unit/ Potassium Chloride 20 meq/ 

Total Parenteral Nutrition/Amino Acids/Dextrose/ Fat Emulsion Intravenous 1,400 

ml @  58.333 mls/ hr TPN  CONT IV  Last administered on 4/17/20at 22:59;  Start 

4/17/20 at 22:00;  Stop 4/18/20 at 21:59;  Status DC


Sodium Chloride 1,000 ml @  1,000 mls/hr Q1H PRN IV hypotension;  Start 4/18/20 

at 08:27;  Stop 4/18/20 at 14:26;  Status DC


Albumin Human 200 ml @  200 mls/hr 1X PRN  PRN IV Hypotension Last administered 

on 4/18/20at 09:18;  Start 4/18/20 at 08:30;  Stop 4/18/20 at 14:29;  Status DC


Sodium Chloride 1,000 ml @  400 mls/hr Q2H30M PRN IV PATENCY;  Start 4/18/20 at 

08:27;  Stop 4/18/20 at 20:26;  Status DC


Info (PHARMACY MONITORING -- do not chart) 1 each PRN DAILY  PRN MC SEE 

COMMENTS;  Start 4/18/20 at 08:30;  Status Cancel


Info (PHARMACY MONITORING -- do not chart) 1 each PRN DAILY  PRN MC SEE 

COMMENTS;  Start 4/18/20 at 08:30;  Stop 4/26/20 at 13:10;  Status DC


Sodium Chloride 100 meq/Potassium Chloride 40 meq/ Magnesium Sulfate 15 

meq/Calcium Gluconate 15 meq/ Multivitamins 10 ml/Chromium/ Copper/Manganese/ 

Seleni/Zn 0.5 ml/ Insulin Human Regular 35 unit/ Total Parenteral 

Nutrition/Amino Acids/Dextrose/ Fat Emulsion Intravenous 1,400 ml @  58.333 mls/

hr TPN  CONT IV  Last administered on 4/18/20at 22:00;  Start 4/18/20 at 22:00; 

Stop 4/19/20 at 21:59;  Status DC


Potassium Chloride/Water 100 ml @  100 mls/hr 1X  ONCE IV  Last administered on 

4/18/20at 17:28;  Start 4/18/20 at 14:45;  Stop 4/18/20 at 15:44;  Status DC


Sodium Chloride 100 meq/Potassium Chloride 40 meq/ Magnesium Sulfate 15 

meq/Calcium Gluconate 15 meq/ Multivitamins 10 ml/Chromium/ Copper/Manganese/ 

Seleni/Zn 0.5 ml/ Insulin Human Regular 35 unit/ Total Parenteral 

Nutrition/Amino Acids/Dextrose/ Fat Emulsion Intravenous 1,400 ml @  58.333 mls/

hr TPN  CONT IV  Last administered on 4/19/20at 22:46;  Start 4/19/20 at 22:00; 

Stop 4/20/20 at 21:59;  Status DC


Sodium Chloride 100 meq/Potassium Chloride 40 meq/ Magnesium Sulfate 20 

meq/Calcium Gluconate 15 meq/ Multivitamins 10 ml/Chromium/ Copper/Manganese/ 

Seleni/Zn 0.5 ml/ Insulin Human Regular 35 unit/ Total Parenteral 

Nutrition/Amino Acids/Dextrose/ Fat Emulsion Intravenous 1,400 ml @  58.333 mls/

hr TPN  CONT IV  Last administered on 4/20/20at 22:31;  Start 4/20/20 at 22:00; 

Stop 4/21/20 at 21:59;  Status DC


Fentanyl Citrate (Fentanyl 2ml Vial) 50 mcg PRN Q2HR  PRN IVP PAIN Last 

administered on 4/27/20at 13:32;  Start 4/20/20 at 21:00;  Stop 4/28/20 at 

12:53;  Status DC


Fentanyl Citrate (Fentanyl 2ml Vial) 25 mcg PRN Q2HR  PRN IVP PAIN;  Start 

4/20/20 at 21:00;  Stop 4/28/20 at 12:54;  Status DC


Enoxaparin Sodium (Lovenox 100mg Syringe) 100 mg Q12HR SQ ;  Start 4/21/20 at 

21:00;  Status UNV


Amino Acids/ Glycerin/ Electrolytes 1,000 ml @  75 mls/hr [id number] IV ;  Start 

4/20/20 at 21:15;  Status UNV


Sodium Chloride 1,000 ml @  1,000 mls/hr Q1H PRN IV hypotension;  Start 4/21/20 

at 07:56;  Stop 4/21/20 at 13:55;  Status DC


Albumin Human 200 ml @  200 mls/hr 1X PRN  PRN IV Hypotension Last administered 

on 4/21/20at 08:40;  Start 4/21/20 at 08:00;  Stop 4/21/20 at 13:59;  Status DC


Sodium Chloride 1,000 ml @  400 mls/hr Q2H30M PRN IV PATENCY;  Start 4/21/20 at 

07:56;  Stop 4/21/20 at 19:55;  Status DC


Info (PHARMACY MONITORING -- do not chart) 1 each PRN DAILY  PRN MC SEE 

COMMENTS;  Start 4/21/20 at 08:00;  Status UNV


Info (PHARMACY MONITORING -- do not chart) 1 each PRN DAILY  PRN MC SEE 

COMMENTS;  Start 4/21/20 at 08:00;  Status UNV


Daptomycin 430 mg/ Sodium Chloride 50 ml @  100 mls/hr Q24H IV  Last 

administered on 4/21/20at 12:35;  Start 4/21/20 at 09:00;  Stop 4/21/20 at 

12:49;  Status DC


Sodium Chloride 100 meq/Potassium Chloride 40 meq/ Magnesium Sulfate 20 meq/Ca

lcium Gluconate 15 meq/ Multivitamins 10 ml/Chromium/ Copper/Manganese/ 

Seleni/Zn 0.5 ml/ Insulin Human Regular 35 unit/ Total Parenteral 

Nutrition/Amino Acids/Dextrose/ Fat Emulsion Intravenous 1,400 ml @  58.333 mls/

hr TPN  CONT IV  Last administered on 4/21/20at 21:26;  Start 4/21/20 at 22:00; 

Stop 4/22/20 at 21:59;  Status DC


Daptomycin 430 mg/ Sodium Chloride 50 ml @  100 mls/hr Q48H IV ;  Start 4/23/20 

at 09:00;  Stop 4/22/20 at 11:55;  Status DC


Sodium Chloride 100 meq/Potassium Chloride 40 meq/ Magnesium Sulfate 20 

meq/Calcium Gluconate 15 meq/ Multivitamins 10 ml/Chromium/ Copper/Manganese/ 

Seleni/Zn 0.5 ml/ Insulin Human Regular 35 unit/ Total Parenteral 

Nutrition/Amino Acids/Dextrose/ Fat Emulsion Intravenous 1,400 ml @  58.333 mls/

hr TPN  CONT IV  Last administered on 4/22/20at 22:27;  Start 4/22/20 at 22:00; 

Stop 4/23/20 at 21:59;  Status DC


Daptomycin 430 mg/ Sodium Chloride 50 ml @  100 mls/hr Q24H IV  Last 

administered on 4/24/20at 15:07;  Start 4/22/20 at 13:00;  Stop 4/25/20 at 

13:15;  Status DC


Sodium Chloride 100 meq/Potassium Chloride 40 meq/ Magnesium Sulfate 20 

meq/Calcium Gluconate 10 meq/ Multivitamins 10 ml/Chromium/ Copper/Manganese/ 

Seleni/Zn 0.5 ml/ Insulin Human Regular 35 unit/ Total Parenteral 

Nutrition/Amino Acids/Dextrose/ Fat Emulsion Intravenous 1,400 ml @  58.333 mls/

hr TPN  CONT IV  Last administered on 4/24/20at 00:06;  Start 4/23/20 at 22:00; 

Stop 4/24/20 at 21:59;  Status DC


Alteplase, Recombinant (Cathflo For Central Catheter Clearance) 1 mg 1X  ONCE 

INT CAT  Last administered on 4/24/20at 11:44;  Start 4/24/20 at 10:45;  Stop 

4/24/20 at 10:46;  Status DC


Ondansetron HCl (Zofran) 4 mg PRN Q6HRS  PRN IV NAUSEA/VOMITING;  Start 4/27/20 

at 07:00;  Stop 4/28/20 at 06:59;  Status DC


Fentanyl Citrate (Fentanyl 2ml Vial) 25 mcg PRN Q5MIN  PRN IV MILD PAIN 1-3;  

Start 4/27/20 at 07:00;  Stop 4/28/20 at 06:59;  Status DC


Fentanyl Citrate (Fentanyl 2ml Vial) 50 mcg PRN Q5MIN  PRN IV MODERATE TO SEVERE

PAIN Last administered on 4/27/20at 10:17;  Start 4/27/20 at 07:00;  Stop 4 /28/20 at 06:59;  Status DC


Ringer's Solution 1,000 ml @  30 mls/hr Q24H IV ;  Start 4/27/20 at 07:00;  Stop

4/27/20 at 18:59;  Status DC


Lidocaine HCl (Xylocaine-Mpf 1% 2ml Vial) 2 ml PRN 1X  PRN ID PRIOR TO IV START;

 Start 4/27/20 at 07:00;  Stop 4/28/20 at 06:59;  Status DC


Prochlorperazine Edisylate (Compazine) 5 mg PACU PRN  PRN IV NAUSEA, MRX1;  

Start 4/27/20 at 07:00;  Stop 4/28/20 at 06:59;  Status DC


Sodium Acetate 50 meq/Potassium Acetate 55 meq/ Magnesium Sulfate 20 meq/Calcium

Gluconate 10 meq/ Multivitamins 10 ml/Chromium/ Copper/Manganese/ Seleni/Zn 0.5 

ml/ Insulin Human Regular 35 unit/ Total Parenteral Nutrition/Amino 

Acids/Dextrose/ Fat Emulsion Intravenous 1,400 ml @  58.333 mls/ hr TPN  CONT IV

;  Start 4/24/20 at 22:00;  Stop 4/24/20 at 14:15;  Status DC


Sodium Acetate 50 meq/Potassium Acetate 55 meq/ Magnesium Sulfate 20 meq/Calcium

Gluconate 10 meq/ Multivitamins 10 ml/Chromium/ Copper/Manganese/ Seleni/Zn 0.5 

ml/ Insulin Human Regular 35 unit/ Total Parenteral Nutrition/Amino Acids/Dex

trose/ Fat Emulsion Intravenous 1,800 ml @  75 mls/hr TPN  CONT IV  Last 

administered on 4/24/20at 22:38;  Start 4/24/20 at 22:00;  Stop 4/25/20 at 

21:59;  Status DC


Sodium Chloride 1,000 ml @  1,000 mls/hr Q1H PRN IV hypotension;  Start 4/24/20 

at 15:31;  Stop 4/24/20 at 21:30;  Status DC


Diphenhydramine HCl (Benadryl) 25 mg 1X PRN  PRN IV ITCHING;  Start 4/24/20 at 

15:45;  Stop 4/25/20 at 15:44;  Status DC


Diphenhydramine HCl (Benadryl) 25 mg 1X PRN  PRN IV ITCHING;  Start 4/24/20 at 

15:45;  Stop 4/25/20 at 15:44;  Status DC


Sodium Chloride 1,000 ml @  400 mls/hr Q2H30M PRN IV PATENCY;  Start 4/24/20 at 

15:31;  Stop 4/25/20 at 03:30;  Status DC


Info (PHARMACY MONITORING -- do not chart) 1 each PRN DAILY  PRN MC SEE 

COMMENTS;  Start 4/24/20 at 15:45


Sodium Acetate 50 meq/Potassium Acetate 55 meq/ Magnesium Sulfate 20 meq/Calcium

Gluconate 10 meq/ Multivitamins 10 ml/Chromium/ Copper/Manganese/ Seleni/Zn 0.5 

ml/ Insulin Human Regular 35 unit/ Total Parenteral Nutrition/Amino 

Acids/Dextrose/ Fat Emulsion Intravenous 1,800 ml @  75 mls/hr TPN  CONT IV  

Last administered on 4/25/20at 22:03;  Start 4/25/20 at 22:00;  Stop 4/26/20 at 

21:59;  Status DC


Daptomycin 430 mg/ Sodium Chloride 50 ml @  100 mls/hr Q24H IV  Last 

administered on 4/30/20at 13:00;  Start 4/25/20 at 13:00;  Stop 4/30/20 at 

20:58;  Status DC


Heparin Sodium (Porcine) 1000 unit/Sodium Chloride 1,001 ml @  1,001 mls/hr 1X  

ONCE IRR ;  Start 4/27/20 at 06:00;  Stop 4/27/20 at 06:59;  Status DC


Potassium Acetate 55 meq/Magnesium Sulfate 20 meq/ Calcium Gluconate 10 meq/ 

Multivitamins 10 ml/Chromium/ Copper/Manganese/ Seleni/Zn 0.5 ml/ Insulin Human 

Regular 35 unit/ Total Parenteral Nutrition/Amino Acids/Dextrose/ Fat Emulsion 

Intravenous 1,920 ml @  80 mls/hr TPN  CONT IV  Last administered on 4/26/20at 

22:10;  Start 4/26/20 at 22:00;  Stop 4/27/20 at 21:59;  Status DC


Dexamethasone Sodium Phosphate (Decadron) 4 mg STK-MED ONCE .ROUTE ;  Start 

4/27/20 at 10:56;  Stop 4/27/20 at 10:57;  Status DC


Ondansetron HCl (Zofran) 4 mg STK-MED ONCE .ROUTE ;  Start 4/27/20 at 10:56;  

Stop 4/27/20 at 10:57;  Status DC


Rocuronium Bromide (Zemuron) 50 mg STK-MED ONCE .ROUTE ;  Start 4/27/20 at 

10:56;  Stop 4/27/20 at 10:57;  Status DC


Fentanyl Citrate (Fentanyl 2ml Vial) 100 mcg STK-MED ONCE .ROUTE ;  Start 

4/27/20 at 10:56;  Stop 4/27/20 at 10:57;  Status DC


Bupivacaine HCl/ Epinephrine Bitart (Sensorcain-Epi 0.5%-1:038780 Mpf) 30 ml 

STK-MED ONCE .ROUTE  Last administered on 4/27/20at 12:01;  Start 4/27/20 at 

10:58;  Stop 4/27/20 at 10:58;  Status DC


Cellulose (Surgicel Hemostat 2x14) 1 each STK-MED ONCE .ROUTE ;  Start 4/27/20 

at 10:58;  Stop 4/27/20 at 10:59;  Status DC


Iohexol (Omnipaque 300 Mg/ml) 50 ml STK-MED ONCE .ROUTE ;  Start 4/27/20 at 

10:58;  Stop 4/27/20 at 10:59;  Status DC


Cellulose (Surgicel Hemostat 4x8) 1 each STK-MED ONCE .ROUTE ;  Start 4/27/20 at

10:58;  Stop 4/27/20 at 10:59;  Status DC


Bisacodyl (Dulcolax Supp) 10 mg STK-MED ONCE .ROUTE ;  Start 4/27/20 at 10:59;  

Stop 4/27/20 at 10:59;  Status DC


Heparin Sodium (Porcine) 1000 unit/Sodium Chloride 1,001 ml @  1,001 mls/hr 1X  

ONCE IRR ;  Start 4/27/20 at 12:00;  Stop 4/27/20 at 12:59;  Status DC


Propofol 20 ml @ As Directed STK-MED ONCE IV ;  Start 4/27/20 at 11:05;  Stop 

4/27/20 at 11:05;  Status DC


Sevoflurane (Ultane) 90 ml STK-MED ONCE IH ;  Start 4/27/20 at 11:05;  Stop 

4/27/20 at 11:05;  Status DC


Sevoflurane (Ultane) 60 ml STK-MED ONCE IH ;  Start 4/27/20 at 12:26;  Stop 

4/27/20 at 12:27;  Status DC


Propofol 20 ml @ As Directed STK-MED ONCE IV ;  Start 4/27/20 at 12:26;  Stop 

4/27/20 at 12:27;  Status DC


Phenylephrine HCl (PHENYLEPHRINE in 0.9% NACL PF) 1 mg STK-MED ONCE IV ;  Start 

4/27/20 at 12:34;  Stop 4/27/20 at 12:34;  Status DC


Heparin Sodium (Porcine) (Heparin Sodium) 5,000 unit Q12HR SQ  Last administered

on 5/6/20at 20:57;  Start 4/27/20 at 21:00;  Stop 5/7/20 at 09:59;  Status DC


Sodium Chloride (Normal Saline Flush) 3 ml QSHIFT  PRN IV AFTER MEDS AND BLOOD 

DRAWS;  Start 4/27/20 at 13:45


Naloxone HCl (Narcan) 0.4 mg PRN Q2MIN  PRN IV SEE INSTRUCTIONS;  Start 4/27/20 

at 13:45


Sodium Chloride 1,000 ml @  25 mls/hr Q24H IV  Last administered on 5/9/20at 

02:30;  Start 4/27/20 at 13:37


Naloxone HCl (Narcan) 0.4 mg PRN Q2MIN  PRN IV SEE INSTRUCTIONS;  Start 4/27/20 

at 14:30;  Status UNV


Sodium Chloride 1,000 ml @  25 mls/hr Q24H IV ;  Start 4/27/20 at 14:30;  Status

UNV


Hydromorphone HCl 30 ml @ 0 mls/hr CONT PRN  PRN IV PER PROTOCOL Last 

administered on 5/2/20at 16:08;  Start 4/27/20 at 14:30;  Stop 5/4/20 at 08:55; 

Status DC


Potassium Acetate 55 meq/Magnesium Sulfate 20 meq/ Calcium Gluconate 10 meq/ 

Multivitamins 10 ml/Chromium/ Copper/Manganese/ Seleni/Zn 0.5 ml/ Insulin Human 

Regular 35 unit/ Total Parenteral Nutrition/Amino Acids/Dextrose/ Fat Emulsion 

Intravenous 1,920 ml @  80 mls/hr TPN  CONT IV  Last administered on 4/27/20at 

22:01;  Start 4/27/20 at 22:00;  Stop 4/28/20 at 21:59;  Status DC


Bumetanide (Bumex) 2 mg [id number] IV  Last administered on 5/1/20at 13:50;  Start 

4/28/20 at 14:00;  Stop 5/2/20 at 14:10;  Status DC


Meropenem 1 gm/ Sodium Chloride 100 ml @  200 mls/hr Q8HRS IV  Last administered

on 5/10/20at 06:26;  Start 4/28/20 at 14:00


Potassium Acetate 55 meq/Magnesium Sulfate 20 meq/ Calcium Gluconate 10 meq/ 

Multivitamins 10 ml/Chromium/ Copper/Manganese/ Seleni/Zn 0.5 ml/ Insulin Human 

Regular 35 unit/ Total Parenteral Nutrition/Amino Acids/Dextrose/ Fat Emulsion 

Intravenous 1,920 ml @  80 mls/hr TPN  CONT IV  Last administered on 4/28/20at 

22:02;  Start 4/28/20 at 22:00;  Stop 4/29/20 at 21:59;  Status DC


Hydromorphone HCl (Dilaudid Standard PCA) 12 mg STK-MED ONCE IV ;  Start 4/27/20

at 14:35;  Stop 4/28/20 at 13:53;  Status DC


Artificial Tears (Artificial Tears) 1 drop PRN Q15MIN  PRN OU DRY EYE Last 

administered on 5/8/20at 22:00;  Start 4/29/20 at 05:30


Hydromorphone HCl (Dilaudid Standard PCA) 12 mg STK-MED ONCE IV ;  Start 4/28/20

at 12:05;  Stop 4/29/20 at 09:15;  Status DC


Potassium Acetate 65 meq/Magnesium Sulfate 20 meq/ Calcium Gluconate 10 meq/ 

Multivitamins 10 ml/Chromium/ Copper/Manganese/ Seleni/Zn 0.5 ml/ Insulin Human 

Regular 30 unit/ Total Parenteral Nutrition/Amino Acids/Dextrose/ Fat Emulsion 

Intravenous 1,920 ml @  80 mls/hr TPN  CONT IV  Last administered on 4/29/20at 

22:22;  Start 4/29/20 at 22:00;  Stop 4/30/20 at 21:59;  Status DC


Cyclobenzaprine HCl (Flexeril) 10 mg PRN Q6HRS  PRN PO MUSCLE SPASMS;  Start 

4/30/20 at 10:45


Potassium Acetate 55 meq/Magnesium Sulfate 20 meq/ Calcium Gluconate 10 meq/ 

Multivitamins 10 ml/Chromium/ Copper/Manganese/ Seleni/Zn 0.5 ml/ Insulin Human 

Regular 30 unit/ Total Parenteral Nutrition/Amino Acids/Dextrose/ Fat Emulsion 

Intravenous 1,920 ml @  80 mls/hr TPN  CONT IV  Last administered on 5/1/20at 

01:00;  Start 4/30/20 at 22:00;  Stop 5/1/20 at 21:59;  Status DC


Magnesium Sulfate 50 ml @ 25 mls/hr 1X  ONCE IV  Last administered on 4/30/20at 

17:18;  Start 4/30/20 at 12:45;  Stop 4/30/20 at 14:44;  Status DC


Potassium Chloride/Water 100 ml @  100 mls/hr 1X  ONCE IV  Last administered on 

5/1/20at 11:27;  Start 5/1/20 at 12:00;  Stop 5/1/20 at 12:59;  Status DC


Hydromorphone HCl (Dilaudid Standard PCA) 12 mg STK-MED ONCE IV ;  Start 4/29/20

at 10:50;  Stop 5/1/20 at 11:02;  Status DC


Hydromorphone HCl (Dilaudid Standard PCA) 12 mg STK-MED ONCE IV ;  Start 4/30/20

at 13:47;  Stop 5/1/20 at 11:03;  Status DC


Potassium Acetate 30 meq/Magnesium Sulfate 20 meq/ Calcium Gluconate 10 meq/ 

Multivitamins 10 ml/Chromium/ Copper/Manganese/ Seleni/Zn 0.5 ml/ Insulin Human 

Regular 30 unit/ Potassium Chloride 30 meq/ Total Parenteral Nutrition/Amino 

Acids/Dextrose/ Fat Emulsion Intravenous 1,920 ml @  80 mls/hr TPN  CONT IV  

Last administered on 5/1/20at 22:34;  Start 5/1/20 at 22:00;  Stop 5/2/20 at 

21:59;  Status DC


Potassium Chloride/Water 100 ml @  100 mls/hr Q1H IV  Last administered on 

5/2/20at 13:05;  Start 5/2/20 at 07:00;  Stop 5/2/20 at 10:59;  Status DC


Magnesium Sulfate 50 ml @ 25 mls/hr 1X  ONCE IV  Last administered on 5/2/20at 

10:34;  Start 5/2/20 at 10:30;  Stop 5/2/20 at 12:29;  Status DC


Potassium Chloride 75 meq/ Magnesium Sulfate 20 meq/Calcium Gluconate 10 meq/ 

Multivitamins 10 ml/Chromium/ Copper/Manganese/ Seleni/Zn 0.5 ml/ Insulin Human 

Regular 30 unit/ Total Parenteral Nutrition/Amino Acids/Dextrose/ Fat Emulsion 

Intravenous 1,920 ml @  80 mls/hr TPN  CONT IV  Last administered on 5/2/20at 

21:51;  Start 5/2/20 at 22:00;  Stop 5/3/20 at 22:00;  Status DC


Potassium Chloride 75 meq/ Magnesium Sulfate 20 meq/Calcium Gluconate 10 meq/ 

Multivitamins 10 ml/Chromium/ Copper/Manganese/ Seleni/Zn 0.5 ml/ Insulin Human 

Regular 25 unit/ Total Parenteral Nutrition/Amino Acids/Dextrose/ Fat Emulsion 

Intravenous 1,920 ml @  80 mls/hr TPN  CONT IV  Last administered on 5/3/20at 

22:04;  Start 5/3/20 at 22:00;  Stop 5/4/20 at 21:59;  Status DC


Hydromorphone HCl (Dilaudid) 0.4 mg PRN Q4HRS  PRN IVP PAIN Last administered on

5/4/20at 10:57;  Start 5/4/20 at 09:00;  Stop 5/4/20 at 18:59;  Status DC


Micafungin Sodium 100 mg/Dextrose 100 ml @  100 mls/hr Q24H IV  Last 

administered on 5/10/20at 12:14;  Start 5/4/20 at 11:00


Daptomycin 485 mg/ Sodium Chloride 50 ml @  100 mls/hr Q24H IV  Last 

administered on 5/10/20at 12:09;  Start 5/4/20 at 11:00


Potassium Chloride 75 meq/ Magnesium Sulfate 15 meq/Calcium Gluconate 8 meq/ 

Multivitamins 10 ml/Chromium/ Copper/Manganese/ Seleni/Zn 0.5 ml/ Insulin Human 

Regular 25 unit/ Total Parenteral Nutrition/Amino Acids/Dextrose/ Fat Emulsion 

Intravenous 1,920 ml @  80 mls/hr TPN  CONT IV  Last administered on 5/4/20at 

23:08;  Start 5/4/20 at 22:00;  Stop 5/5/20 at 21:59;  Status DC


Haloperidol Lactate (Haldol Inj) 3 mg 1X  ONCE IVP  Last administered on 

5/4/20at 14:37;  Start 5/4/20 at 14:30;  Stop 5/4/20 at 14:31;  Status DC


Hydromorphone HCl (Dilaudid) 1 mg PRN Q4HRS  PRN IVP PAIN Last administered on 

5/10/20at 12:10;  Start 5/4/20 at 19:00


Potassium Chloride 75 meq/ Magnesium Sulfate 15 meq/Calcium Gluconate 8 meq/ 

Multivitamins 10 ml/Chromium/ Copper/Manganese/ Seleni/Zn 0.5 ml/ Insulin Human 

Regular 20 unit/ Total Parenteral Nutrition/Amino Acids/Dextrose/ Fat Emulsion 

Intravenous 1,920 ml @  80 mls/hr TPN  CONT IV  Last administered on 5/5/20at 

22:10;  Start 5/5/20 at 22:00;  Stop 5/6/20 at 21:59;  Status DC


Lidocaine HCl (Buffered Lidocaine 1%) 3 ml STK-MED ONCE .ROUTE ;  Start 5/6/20 

at 11:31;  Stop 5/6/20 at 11:31;  Status DC


Lidocaine HCl (Buffered Lidocaine 1%) 3 ml STK-MED ONCE .ROUTE ;  Start 5/6/20 

at 12:28;  Stop 5/6/20 at 12:29;  Status DC


Lidocaine HCl (Buffered Lidocaine 1%) 6 ml 1X  ONCE INJ  Last administered on 

5/6/20at 12:53;  Start 5/6/20 at 12:45;  Stop 5/6/20 at 12:46;  Status DC


Potassium Chloride 75 meq/ Magnesium Sulfate 15 meq/Calcium Gluconate 8 meq/ 

Multivitamins 10 ml/Chromium/ Copper/Manganese/ Seleni/Zn 0.5 ml/ Insulin Human 

Regular 20 unit/ Total Parenteral Nutrition/Amino Acids/Dextrose/ Fat Emulsion 

Intravenous 1,920 ml @  80 mls/hr TPN  CONT IV  Last administered on 5/6/20at 

22:00;  Start 5/6/20 at 22:00;  Stop 5/7/20 at 21:59;  Status DC


Potassium Chloride 75 meq/ Magnesium Sulfate 15 meq/Calcium Gluconate 8 meq/ 

Multivitamins 10 ml/Chromium/ Copper/Manganese/ Seleni/Zn 0.5 ml/ Insulin Human 

Regular 15 unit/ Total Parenteral Nutrition/Amino Acids/Dextrose/ Fat Emulsion 

Intravenous 1,920 ml @  80 mls/hr TPN  CONT IV  Last administered on 5/7/20at 

22:28;  Start 5/7/20 at 22:00;  Stop 5/8/20 at 21:59;  Status DC


Vecuronium Bromide (Norcuron Bolus) 6 mg PRN Q6HRS  PRN IV VENT ASYNCHRONY;  

Start 5/7/20 at 19:15;  Stop 5/7/20 at 19:35;  Status DC


Bumetanide (Bumex) 2 mg 1X  ONCE IV  Last administered on 5/7/20at 22:09;  Start

5/7/20 at 19:45;  Stop 5/7/20 at 19:46;  Status DC


Lidocaine HCl (Buffered Lidocaine 1%) 3 ml STK-MED ONCE .ROUTE ;  Start 5/8/20 

at 07:59;  Stop 5/8/20 at 07:59;  Status DC


Midazolam HCl (Versed) 5 mg STK-MED ONCE .ROUTE ;  Start 5/8/20 at 08:36;  Stop 

5/8/20 at 08:36;  Status DC


Fentanyl Citrate (Fentanyl 5ml Vial) 250 mcg STK-MED ONCE .ROUTE ;  Start 5/8/20

at 08:36;  Stop 5/8/20 at 08:37;  Status DC


Lidocaine HCl (Buffered Lidocaine 1%) 3 ml 1X  ONCE IJ  Last administered on 

5/8/20at 09:30;  Start 5/8/20 at 09:15;  Stop 5/8/20 at 09:16;  Status DC


Midazolam HCl (Versed) 5 mg 1X  ONCE IV  Last administered on 5/8/20at 09:30;  

Start 5/8/20 at 09:15;  Stop 5/8/20 at 09:16;  Status DC


Fentanyl Citrate (Fentanyl 5ml Vial) 250 mcg 1X  ONCE IV  Last administered on 

5/8/20at 09:30;  Start 5/8/20 at 09:15;  Stop 5/8/20 at 09:16;  Status DC


Bumetanide (Bumex) 2 mg DAILY IV  Last administered on 5/10/20at 10:48;  Start 

5/8/20 at 10:00


Potassium Chloride 75 meq/ Magnesium Sulfate 15 meq/ Multivitamins 10 

ml/Chromium/ Copper/Manganese/ Seleni/Zn 0.5 ml/ Insulin Human Regular 15 unit/ 

Total Parenteral Nutrition/Amino Acids/Dextrose/ Fat Emulsion Intravenous 1,920 

ml @  80 mls/hr TPN  CONT IV  Last administered on 5/8/20at 21:59;  Start 5/8/20

at 22:00;  Stop 5/9/20 at 21:59;  Status DC


Metoclopramide HCl (Reglan Vial) 10 mg PRN Q3HRS  PRN IVP NAUSEA/VOMITING-3rd 

choice Last administered on 5/10/20at 08:06;  Start 5/9/20 at 16:45


Potassium Chloride 75 meq/ Magnesium Sulfate 15 meq/ Multivitamins 10 

ml/Chromium/ Copper/Manganese/ Seleni/Zn 0.5 ml/ Insulin Human Regular 15 unit/ 

Total Parenteral Nutrition/Amino Acids/Dextrose/ Fat Emulsion Intravenous 1,920 

ml @  80 mls/hr TPN  CONT IV  Last administered on 5/9/20at 22:41;  Start 5/9/20

at 22:00;  Stop 5/10/20 at 21:59


Magnesium Sulfate 50 ml @ 25 mls/hr 1X  ONCE IV  Last administered on 5/10/20at 

10:44;  Start 5/10/20 at 09:00;  Stop 5/10/20 at 10:59;  Status DC


Potassium Chloride/Water 100 ml @  100 mls/hr 1X  ONCE IV  Last administered on 

5/10/20at 09:37;  Start 5/10/20 at 09:00;  Stop 5/10/20 at 09:59;  Status DC


Duloxetine HCl (Cymbalta) 30 mg DAILY PO ;  Start 5/10/20 at 14:00


Potassium Chloride 80 meq/ Magnesium Sulfate 20 meq/ Multivitamins 10 

ml/Chromium/ Copper/Manganese/ Seleni/Zn 0.5 ml/ Insulin Human Regular 15 unit/ 

Total Parenteral Nutrition/Amino Acids/Dextrose/ Fat Emulsion Intravenous 1,920 

ml @  80 mls/hr TPN  CONT IV ;  Start 5/10/20 at 22:00;  Stop 5/11/20 at 21:59





Active Scripts


Active


Reported


Bisoprolol Fumarate 5 Mg Tablet 10 Mg PO DAILY


Vitals/I & O





Vital Sign - Last 24 Hours








 5/9/20 5/9/20 5/9/20 5/9/20





 14:00 15:00 15:16 15:30


 


Pulse 96 96  


 


Resp 27 27  


 


B/P (MAP) 118/61 (80) 135/90 (105)  


 


Pulse Ox 98 98 97 97


 


O2 Delivery Ventilator Ventilator Ventilator Ventilator





 5/9/20 5/9/20 5/9/20 5/9/20





 16:00 16:00 16:00 17:00


 


Temp 98.1   





 98.1   


 


Pulse 96   104


 


Resp 27   24


 


B/P (MAP) 131/61 (84)   132/87 (102)


 


Pulse Ox 98 98  99


 


O2 Delivery Ventilator Ventilator Trach Collar Ventilator


 


    





    





 5/9/20 5/9/20 5/9/20 5/9/20





 17:40 18:00 19:00 19:45


 


Pulse  104 101 


 


Resp  24 23 


 


B/P (MAP)  136/92 (107) 117/73 (88) 


 


Pulse Ox 98 98 98 99


 


O2 Delivery Ventilator Ventilator Ventilator Ventilator





 5/9/20 5/9/20 5/9/20 5/9/20





 20:00 20:00 21:00 22:00


 


Temp  99.2  





  99.2  


 


Pulse  109 112 108


 


Resp  25 24 23


 


B/P (MAP)  120/66 (84) 141/78 (99) 150/60 (90)


 


Pulse Ox  97 97 99


 


O2 Delivery Mechanical Ventilator Ventilator Ventilator Ventilator


 


    





    





 5/9/20 5/9/20 5/9/20 5/9/20





 22:40 22:40 23:00 23:10


 


Pulse   110 


 


Resp 20  22 23


 


B/P (MAP)   144/70 (94) 


 


Pulse Ox 96 94 96 96


 


O2 Delivery Ventilator Ventilator Ventilator Ventilator





 5/10/20 5/10/20 5/10/20 5/10/20





 00:00 00:01 00:51 01:00


 


Temp  98.7  





  98.7  


 


Pulse  111  113


 


Resp  24  25


 


B/P (MAP)  148/70 (96)  150/75 (100)


 


Pulse Ox  96 94 99


 


O2 Delivery Mechanical Ventilator Ventilator Ventilator Ventilator


 


    





    





 5/10/20 5/10/20 5/10/20 5/10/20





 02:00 02:58 03:00 03:12


 


Pulse 118  107 


 


Resp 23 25 21 


 


B/P (MAP) 131/73 (92)  130/66 (87) 


 


Pulse Ox 97  96 94


 


O2 Delivery Ventilator Ventilator Ventilator Ventilator





 5/10/20 5/10/20 5/10/20 5/10/20





 03:28 04:00 04:00 05:00


 


Temp   99.2 





   99.2 


 


Pulse   106 105


 


Resp 25  24 21


 


B/P (MAP)   133/67 (89) 117/70 (86)


 


Pulse Ox   96 98


 


O2 Delivery Ventilator Mechanical Ventilator Ventilator Ventilator


 


    





    





 5/10/20 5/10/20 5/10/20 5/10/20





 05:34 06:00 08:11 08:18


 


Pulse  101  


 


Resp  25 19 


 


B/P (MAP)  142/85 (104)  


 


Pulse Ox 97 97 96 97


 


O2 Delivery Ventilator Ventilator Ventilator Ventilator





 5/10/20 5/10/20 5/10/20 5/10/20





 08:41 09:35 12:10 12:37


 


Resp 20 21 


 


Pulse Ox 98 97 98 98


 


O2 Delivery Ventilator Ventilator Ventilator Ventilator














Intake and Output   


 


 5/9/20 5/9/20 5/10/20





 14:59 22:59 06:59


 


Intake Total   1852 ml


 


Output Total 1290 ml 1205 ml 1430 ml


 


Balance -1290 ml -1205 ml 422 ml











Hemodynamically unstable?:  No


Is patient in severe pain?:  Yes


Is NPO status required?:  Yes











HIRAM BARRERA MD             May 10, 2020 13:21

## 2020-05-10 NOTE — PDOC
SURGICAL PROGRESS NOTE


Subjective


Tom for Dr Flores


awake, alert, writing questions on her pad with Loc


asking about time frames ("like this", surgery)


Vital Signs





Vital Signs








  Date Time  Temp Pulse Resp B/P (MAP) Pulse Ox O2 Delivery O2 Flow Rate FiO2


 


5/10/20 12:37     98 Ventilator  


 


5/10/20 12:10   21     


 


5/10/20 06:00  101  142/85 (104)    


 


5/10/20 04:00 99.2       





 99.2       








I&O











Intake and Output 


 


 5/10/20





 07:00


 


Intake Total 1852 ml


 


Output Total 3925 ml


 


Balance -2073 ml


 


 


 


IV Total 1852 ml


 


Output Urine Total 3525 ml


 


Drainage Total 400 ml








PATIENT HAS A VILLASENOR:  Yes (accurate I and O)


General:  Alert


Abdomen:  Soft


Labs





Laboratory Tests








Test


 5/8/20


18:13 5/9/20


00:18 5/9/20


06:00 5/9/20


13:34


 


Glucose (Fingerstick)


 150 mg/dL


(70-99) 159 mg/dL


(70-99) 139 mg/dL


(70-99) 182 mg/dL


(70-99)


 


Sodium Level


 


 


 149 mmol/L


(136-145) 





 


Potassium Level


 


 


 3.9 mmol/L


(3.5-5.1) 





 


Chloride Level


 


 


 111 mmol/L


() 





 


Carbon Dioxide Level


 


 


 31 mmol/L


(21-32) 





 


Anion Gap   7 (6-14)  


 


Blood Urea Nitrogen


 


 


 33 mg/dL


(7-20) 





 


Creatinine


 


 


 0.8 mg/dL


(0.6-1.0) 





 


Estimated GFR


(Cockcroft-Gault) 


 


 76.2 


 





 


Glucose Level


 


 


 145 mg/dL


(70-99) 





 


Calcium Level


 


 


 8.7 mg/dL


(8.5-10.1) 





 


Test


 5/9/20


18:32 5/10/20


00:54 5/10/20


06:15 5/10/20


06:23


 


Glucose (Fingerstick)


 139 mg/dL


(70-99) 156 mg/dL


(70-99) 


 131 mg/dL


(70-99)


 


White Blood Count


 


 


 10.3 x10^3/uL


(4.0-11.0) 





 


Red Blood Count


 


 


 2.53 x10^6/uL


(3.50-5.40) 





 


Hemoglobin


 


 


 7.3 g/dL


(12.0-15.5) 





 


Hematocrit


 


 


 22.5 %


(36.0-47.0) 





 


Mean Corpuscular Volume   89 fL ()  


 


Mean Corpuscular Hemoglobin   29 pg (25-35)  


 


Mean Corpuscular Hemoglobin


Concent 


 


 32 g/dL


(31-37) 





 


Red Cell Distribution Width


 


 


 17.8 %


(11.5-14.5) 





 


Platelet Count


 


 


 439 x10^3/uL


(140-400) 





 


Sodium Level


 


 


 148 mmol/L


(136-145) 





 


Potassium Level


 


 


 3.4 mmol/L


(3.5-5.1) 





 


Chloride Level


 


 


 109 mmol/L


() 





 


Carbon Dioxide Level


 


 


 31 mmol/L


(21-32) 





 


Anion Gap   8 (6-14)  


 


Blood Urea Nitrogen


 


 


 30 mg/dL


(7-20) 





 


Creatinine


 


 


 0.7 mg/dL


(0.6-1.0) 





 


Estimated GFR


(Cockcroft-Gault) 


 


 88.9 


 





 


BUN/Creatinine Ratio   43 (6-20)  


 


Glucose Level


 


 


 135 mg/dL


(70-99) 





 


Calcium Level


 


 


 8.2 mg/dL


(8.5-10.1) 





 


Magnesium Level


 


 


 1.6 mg/dL


(1.8-2.4) 





 


Total Bilirubin


 


 


 0.5 mg/dL


(0.2-1.0) 





 


Aspartate Amino Transf


(AST/SGOT) 


 


 40 U/L (15-37) 


 





 


Alanine Aminotransferase


(ALT/SGPT) 


 


 41 U/L (14-59) 


 





 


Alkaline Phosphatase


 


 


 123 U/L


() 





 


Total Protein


 


 


 5.4 g/dL


(6.4-8.2) 





 


Albumin


 


 


 1.6 g/dL


(3.4-5.0) 





 


Albumin/Globulin Ratio   0.4 (1.0-1.7)  


 


Test


 5/10/20


12:40 


 


 





 


Glucose (Fingerstick)


 149 mg/dL


(70-99) 


 


 











Laboratory Tests








Test


 5/9/20


13:34 5/9/20


18:32 5/10/20


00:54 5/10/20


06:15


 


Glucose (Fingerstick)


 182 mg/dL


(70-99) 139 mg/dL


(70-99) 156 mg/dL


(70-99) 





 


White Blood Count


 


 


 


 10.3 x10^3/uL


(4.0-11.0)


 


Red Blood Count


 


 


 


 2.53 x10^6/uL


(3.50-5.40)


 


Hemoglobin


 


 


 


 7.3 g/dL


(12.0-15.5)


 


Hematocrit


 


 


 


 22.5 %


(36.0-47.0)


 


Mean Corpuscular Volume    89 fL () 


 


Mean Corpuscular Hemoglobin    29 pg (25-35) 


 


Mean Corpuscular Hemoglobin


Concent 


 


 


 32 g/dL


(31-37)


 


Red Cell Distribution Width


 


 


 


 17.8 %


(11.5-14.5)


 


Platelet Count


 


 


 


 439 x10^3/uL


(140-400)


 


Sodium Level


 


 


 


 148 mmol/L


(136-145)


 


Potassium Level


 


 


 


 3.4 mmol/L


(3.5-5.1)


 


Chloride Level


 


 


 


 109 mmol/L


()


 


Carbon Dioxide Level


 


 


 


 31 mmol/L


(21-32)


 


Anion Gap    8 (6-14) 


 


Blood Urea Nitrogen


 


 


 


 30 mg/dL


(7-20)


 


Creatinine


 


 


 


 0.7 mg/dL


(0.6-1.0)


 


Estimated GFR


(Cockcroft-Gault) 


 


 


 88.9 





 


BUN/Creatinine Ratio    43 (6-20) 


 


Glucose Level


 


 


 


 135 mg/dL


(70-99)


 


Calcium Level


 


 


 


 8.2 mg/dL


(8.5-10.1)


 


Magnesium Level


 


 


 


 1.6 mg/dL


(1.8-2.4)


 


Total Bilirubin


 


 


 


 0.5 mg/dL


(0.2-1.0)


 


Aspartate Amino Transf


(AST/SGOT) 


 


 


 40 U/L (15-37) 





 


Alanine Aminotransferase


(ALT/SGPT) 


 


 


 41 U/L (14-59) 





 


Alkaline Phosphatase


 


 


 


 123 U/L


()


 


Total Protein


 


 


 


 5.4 g/dL


(6.4-8.2)


 


Albumin


 


 


 


 1.6 g/dL


(3.4-5.0)


 


Albumin/Globulin Ratio    0.4 (1.0-1.7) 


 


Test


 5/10/20


06:23 5/10/20


12:40 


 





 


Glucose (Fingerstick)


 131 mg/dL


(70-99) 149 mg/dL


(70-99) 


 











Problem List


Problems


Medical Problems:


(1) Acute pancreatitis


Status: Acute  





(2) Cholelithiasis


Status: Acute  








Assessment/Plan


severe pancreatitis





continue supportive care











KIEL BAL MD                May 10, 2020 13:26

## 2020-05-10 NOTE — NUR
Pt reported increased nausea throughout the night. PRN nausea medications administered per 
order. At approx 0640 this am, pt pointed to her oral suction and begin to vomit. This RN 
added about 2mL of air to the trach cuff, assisted the patient in suctioning her mouth, and 
administered PRN Zofran. NG tube is patent and hooked up to LIS. Will pass on and continue 
to monitor. 

Pt reported that PRN med was successful in reducing nausea.

## 2020-05-11 VITALS — DIASTOLIC BLOOD PRESSURE: 70 MMHG | SYSTOLIC BLOOD PRESSURE: 128 MMHG

## 2020-05-11 VITALS — SYSTOLIC BLOOD PRESSURE: 136 MMHG | DIASTOLIC BLOOD PRESSURE: 66 MMHG

## 2020-05-11 VITALS — DIASTOLIC BLOOD PRESSURE: 78 MMHG | SYSTOLIC BLOOD PRESSURE: 140 MMHG

## 2020-05-11 VITALS — DIASTOLIC BLOOD PRESSURE: 77 MMHG | SYSTOLIC BLOOD PRESSURE: 144 MMHG

## 2020-05-11 VITALS — SYSTOLIC BLOOD PRESSURE: 134 MMHG | DIASTOLIC BLOOD PRESSURE: 81 MMHG

## 2020-05-11 VITALS — DIASTOLIC BLOOD PRESSURE: 72 MMHG | SYSTOLIC BLOOD PRESSURE: 127 MMHG

## 2020-05-11 VITALS — SYSTOLIC BLOOD PRESSURE: 122 MMHG | DIASTOLIC BLOOD PRESSURE: 72 MMHG

## 2020-05-11 VITALS — DIASTOLIC BLOOD PRESSURE: 78 MMHG | SYSTOLIC BLOOD PRESSURE: 142 MMHG

## 2020-05-11 VITALS — SYSTOLIC BLOOD PRESSURE: 135 MMHG | DIASTOLIC BLOOD PRESSURE: 78 MMHG

## 2020-05-11 VITALS — SYSTOLIC BLOOD PRESSURE: 150 MMHG | DIASTOLIC BLOOD PRESSURE: 81 MMHG

## 2020-05-11 VITALS — SYSTOLIC BLOOD PRESSURE: 140 MMHG | DIASTOLIC BLOOD PRESSURE: 82 MMHG

## 2020-05-11 VITALS — SYSTOLIC BLOOD PRESSURE: 125 MMHG | DIASTOLIC BLOOD PRESSURE: 67 MMHG

## 2020-05-11 VITALS — DIASTOLIC BLOOD PRESSURE: 65 MMHG | SYSTOLIC BLOOD PRESSURE: 137 MMHG

## 2020-05-11 VITALS — DIASTOLIC BLOOD PRESSURE: 65 MMHG | SYSTOLIC BLOOD PRESSURE: 132 MMHG

## 2020-05-11 VITALS — SYSTOLIC BLOOD PRESSURE: 127 MMHG | DIASTOLIC BLOOD PRESSURE: 68 MMHG

## 2020-05-11 VITALS — DIASTOLIC BLOOD PRESSURE: 66 MMHG | SYSTOLIC BLOOD PRESSURE: 114 MMHG

## 2020-05-11 VITALS — SYSTOLIC BLOOD PRESSURE: 144 MMHG | DIASTOLIC BLOOD PRESSURE: 92 MMHG

## 2020-05-11 VITALS — SYSTOLIC BLOOD PRESSURE: 149 MMHG | DIASTOLIC BLOOD PRESSURE: 79 MMHG

## 2020-05-11 VITALS — DIASTOLIC BLOOD PRESSURE: 96 MMHG | SYSTOLIC BLOOD PRESSURE: 165 MMHG

## 2020-05-11 VITALS — DIASTOLIC BLOOD PRESSURE: 72 MMHG | SYSTOLIC BLOOD PRESSURE: 119 MMHG

## 2020-05-11 VITALS — DIASTOLIC BLOOD PRESSURE: 96 MMHG | SYSTOLIC BLOOD PRESSURE: 154 MMHG

## 2020-05-11 VITALS — SYSTOLIC BLOOD PRESSURE: 156 MMHG | DIASTOLIC BLOOD PRESSURE: 85 MMHG

## 2020-05-11 VITALS — SYSTOLIC BLOOD PRESSURE: 134 MMHG | DIASTOLIC BLOOD PRESSURE: 77 MMHG

## 2020-05-11 LAB
ANION GAP SERPL CALC-SCNC: 6 MMOL/L (ref 6–14)
BUN SERPL-MCNC: 29 MG/DL (ref 7–20)
CALCIUM SERPL-MCNC: 8.4 MG/DL (ref 8.5–10.1)
CHLORIDE SERPL-SCNC: 106 MMOL/L (ref 98–107)
CO2 SERPL-SCNC: 33 MMOL/L (ref 21–32)
CREAT SERPL-MCNC: 0.7 MG/DL (ref 0.6–1)
GFR SERPLBLD BASED ON 1.73 SQ M-ARVRAT: 88.9 ML/MIN
GLUCOSE SERPL-MCNC: 125 MG/DL (ref 70–99)
MAGNESIUM SERPL-MCNC: 2 MG/DL (ref 1.8–2.4)
PHOSPHATE SERPL-MCNC: 3.8 MG/DL (ref 2.6–4.7)
POTASSIUM SERPL-SCNC: 3.8 MMOL/L (ref 3.5–5.1)
SODIUM SERPL-SCNC: 145 MMOL/L (ref 136–145)

## 2020-05-11 RX ADMIN — Medication PRN EACH: at 15:24

## 2020-05-11 RX ADMIN — PANTOPRAZOLE SODIUM SCH MG: 40 INJECTION, POWDER, FOR SOLUTION INTRAVENOUS at 09:47

## 2020-05-11 RX ADMIN — DEXMEDETOMIDINE HYDROCHLORIDE PRN MLS/HR: 100 INJECTION, SOLUTION, CONCENTRATE INTRAVENOUS at 17:09

## 2020-05-11 RX ADMIN — DEXMEDETOMIDINE HYDROCHLORIDE PRN MLS/HR: 100 INJECTION, SOLUTION, CONCENTRATE INTRAVENOUS at 04:40

## 2020-05-11 RX ADMIN — HYDROMORPHONE HYDROCHLORIDE PRN MG: 2 INJECTION INTRAMUSCULAR; INTRAVENOUS; SUBCUTANEOUS at 11:30

## 2020-05-11 RX ADMIN — DAPTOMYCIN SCH MLS/HR: 500 INJECTION, POWDER, LYOPHILIZED, FOR SOLUTION INTRAVENOUS at 13:10

## 2020-05-11 RX ADMIN — HYDROMORPHONE HYDROCHLORIDE PRN MG: 2 INJECTION INTRAMUSCULAR; INTRAVENOUS; SUBCUTANEOUS at 18:20

## 2020-05-11 RX ADMIN — INSULIN LISPRO SCH UNITS: 100 INJECTION, SOLUTION INTRAVENOUS; SUBCUTANEOUS at 12:09

## 2020-05-11 RX ADMIN — DEXMEDETOMIDINE HYDROCHLORIDE PRN MLS/HR: 100 INJECTION, SOLUTION, CONCENTRATE INTRAVENOUS at 10:26

## 2020-05-11 RX ADMIN — INSULIN LISPRO SCH UNITS: 100 INJECTION, SOLUTION INTRAVENOUS; SUBCUTANEOUS at 06:00

## 2020-05-11 RX ADMIN — MEROPENEM SCH MLS/HR: 1 INJECTION, POWDER, FOR SOLUTION INTRAVENOUS at 22:16

## 2020-05-11 RX ADMIN — METOCLOPRAMIDE PRN MG: 5 INJECTION, SOLUTION INTRAMUSCULAR; INTRAVENOUS at 04:40

## 2020-05-11 RX ADMIN — DEXTROSE SCH MLS/HR: 50 INJECTION, SOLUTION INTRAVENOUS at 11:08

## 2020-05-11 RX ADMIN — INSULIN LISPRO SCH UNITS: 100 INJECTION, SOLUTION INTRAVENOUS; SUBCUTANEOUS at 00:30

## 2020-05-11 RX ADMIN — MEROPENEM SCH MLS/HR: 1 INJECTION, POWDER, FOR SOLUTION INTRAVENOUS at 06:05

## 2020-05-11 RX ADMIN — BACITRACIN SCH MLS/HR: 5000 INJECTION, POWDER, FOR SOLUTION INTRAMUSCULAR at 13:30

## 2020-05-11 RX ADMIN — BUMETANIDE SCH MG: 0.25 INJECTION INTRAMUSCULAR; INTRAVENOUS at 09:48

## 2020-05-11 RX ADMIN — METOCLOPRAMIDE PRN MG: 5 INJECTION, SOLUTION INTRAMUSCULAR; INTRAVENOUS at 11:29

## 2020-05-11 RX ADMIN — INSULIN LISPRO SCH UNITS: 100 INJECTION, SOLUTION INTRAVENOUS; SUBCUTANEOUS at 22:19

## 2020-05-11 RX ADMIN — MEROPENEM SCH MLS/HR: 1 INJECTION, POWDER, FOR SOLUTION INTRAVENOUS at 14:19

## 2020-05-11 RX ADMIN — HYDROMORPHONE HYDROCHLORIDE PRN MG: 2 INJECTION INTRAMUSCULAR; INTRAVENOUS; SUBCUTANEOUS at 06:19

## 2020-05-11 NOTE — PDOC
PULMONARY PROGRESS NOTES


Subjective


Patient intubated on 3/23 , s/p trach 4/6, awake alert follows commands


On pressure support PEEP 5 and 30%


Nursing reports ongoing  N/V


Vitals





Vital Signs








  Date Time  Temp Pulse Resp B/P (MAP) Pulse Ox O2 Delivery O2 Flow Rate FiO2


 


5/11/20 12:00   21  97 Ventilator  


 


5/11/20 08:00 98.2 98  125/67 (86)    





 98.2       








ROS:  No Chest Pain, No Abdominal Pain, No Increase Cough


General:  Alert, No acute distress


Lungs:  Crackles


Cardiovascular:  S1, S2


Abdomen:  Soft, Non-tender, Other (firm)


Neuro Exam:  Alert


Extremities:  Other (+3 generalized edema )


Skin:  Warm, Dry


Labs





Laboratory Tests








Test


 5/9/20


18:32 5/10/20


00:54 5/10/20


06:15 5/10/20


06:23


 


Glucose (Fingerstick)


 139 mg/dL


(70-99) 156 mg/dL


(70-99) 


 131 mg/dL


(70-99)


 


White Blood Count


 


 


 10.3 x10^3/uL


(4.0-11.0) 





 


Red Blood Count


 


 


 2.53 x10^6/uL


(3.50-5.40) 





 


Hemoglobin


 


 


 7.3 g/dL


(12.0-15.5) 





 


Hematocrit


 


 


 22.5 %


(36.0-47.0) 





 


Mean Corpuscular Volume   89 fL ()  


 


Mean Corpuscular Hemoglobin   29 pg (25-35)  


 


Mean Corpuscular Hemoglobin


Concent 


 


 32 g/dL


(31-37) 





 


Red Cell Distribution Width


 


 


 17.8 %


(11.5-14.5) 





 


Platelet Count


 


 


 439 x10^3/uL


(140-400) 





 


Sodium Level


 


 


 148 mmol/L


(136-145) 





 


Potassium Level


 


 


 3.4 mmol/L


(3.5-5.1) 





 


Chloride Level


 


 


 109 mmol/L


() 





 


Carbon Dioxide Level


 


 


 31 mmol/L


(21-32) 





 


Anion Gap   8 (6-14)  


 


Blood Urea Nitrogen


 


 


 30 mg/dL


(7-20) 





 


Creatinine


 


 


 0.7 mg/dL


(0.6-1.0) 





 


Estimated GFR


(Cockcroft-Gault) 


 


 88.9 


 





 


BUN/Creatinine Ratio   43 (6-20)  


 


Glucose Level


 


 


 135 mg/dL


(70-99) 





 


Calcium Level


 


 


 8.2 mg/dL


(8.5-10.1) 





 


Magnesium Level


 


 


 1.6 mg/dL


(1.8-2.4) 





 


Total Bilirubin


 


 


 0.5 mg/dL


(0.2-1.0) 





 


Aspartate Amino Transf


(AST/SGOT) 


 


 40 U/L (15-37) 


 





 


Alanine Aminotransferase


(ALT/SGPT) 


 


 41 U/L (14-59) 


 





 


Alkaline Phosphatase


 


 


 123 U/L


() 





 


Total Protein


 


 


 5.4 g/dL


(6.4-8.2) 





 


Albumin


 


 


 1.6 g/dL


(3.4-5.0) 





 


Albumin/Globulin Ratio   0.4 (1.0-1.7)  


 


Test


 5/10/20


12:40 5/10/20


15:20 5/10/20


17:44 5/11/20


00:13


 


Glucose (Fingerstick)


 149 mg/dL


(70-99) 


 142 mg/dL


(70-99) 162 mg/dL


(70-99)


 


Sodium Level


 


 146 mmol/L


(136-145) 


 





 


Potassium Level


 


 3.4 mmol/L


(3.5-5.1) 


 





 


Chloride Level


 


 106 mmol/L


() 


 





 


Carbon Dioxide Level


 


 33 mmol/L


(21-32) 


 





 


Anion Gap  7 (6-14)   


 


Blood Urea Nitrogen


 


 28 mg/dL


(7-20) 


 





 


Creatinine


 


 0.9 mg/dL


(0.6-1.0) 


 





 


Estimated GFR


(Cockcroft-Gault) 


 66.5 


 


 





 


BUN/Creatinine Ratio  31 (6-20)   


 


Glucose Level


 


 166 mg/dL


(70-99) 


 





 


Calcium Level


 


 8.2 mg/dL


(8.5-10.1) 


 





 


Total Bilirubin


 


 0.4 mg/dL


(0.2-1.0) 


 





 


Aspartate Amino Transf


(AST/SGOT) 


 38 U/L (15-37) 


 


 





 


Alanine Aminotransferase


(ALT/SGPT) 


 39 U/L (14-59) 


 


 





 


Alkaline Phosphatase


 


 95 U/L


() 


 





 


Total Protein


 


 5.5 g/dL


(6.4-8.2) 


 





 


Albumin


 


 1.6 g/dL


(3.4-5.0) 


 





 


Albumin/Globulin Ratio  0.4 (1.0-1.7)   


 


Test


 5/11/20


05:00 5/11/20


05:06 5/11/20


12:05 





 


Sodium Level


 145 mmol/L


(136-145) 


 


 





 


Potassium Level


 3.8 mmol/L


(3.5-5.1) 


 


 





 


Chloride Level


 106 mmol/L


() 


 


 





 


Carbon Dioxide Level


 33 mmol/L


(21-32) 


 


 





 


Anion Gap 6 (6-14)    


 


Blood Urea Nitrogen


 29 mg/dL


(7-20) 


 


 





 


Creatinine


 0.7 mg/dL


(0.6-1.0) 


 


 





 


Estimated GFR


(Cockcroft-Gault) 88.9 


 


 


 





 


Glucose Level


 125 mg/dL


(70-99) 


 


 





 


Calcium Level


 8.4 mg/dL


(8.5-10.1) 


 


 





 


Phosphorus Level


 3.8 mg/dL


(2.6-4.7) 


 


 





 


Magnesium Level


 2.0 mg/dL


(1.8-2.4) 


 


 





 


Glucose (Fingerstick)


 


 126 mg/dL


(70-99) 181 mg/dL


(70-99) 











Laboratory Tests








Test


 5/10/20


15:20 5/10/20


17:44 5/11/20


00:13 5/11/20


05:00


 


Sodium Level


 146 mmol/L


(136-145) 


 


 145 mmol/L


(136-145)


 


Potassium Level


 3.4 mmol/L


(3.5-5.1) 


 


 3.8 mmol/L


(3.5-5.1)


 


Chloride Level


 106 mmol/L


() 


 


 106 mmol/L


()


 


Carbon Dioxide Level


 33 mmol/L


(21-32) 


 


 33 mmol/L


(21-32)


 


Anion Gap 7 (6-14)    6 (6-14) 


 


Blood Urea Nitrogen


 28 mg/dL


(7-20) 


 


 29 mg/dL


(7-20)


 


Creatinine


 0.9 mg/dL


(0.6-1.0) 


 


 0.7 mg/dL


(0.6-1.0)


 


Estimated GFR


(Cockcroft-Gault) 66.5 


 


 


 88.9 





 


BUN/Creatinine Ratio 31 (6-20)    


 


Glucose Level


 166 mg/dL


(70-99) 


 


 125 mg/dL


(70-99)


 


Calcium Level


 8.2 mg/dL


(8.5-10.1) 


 


 8.4 mg/dL


(8.5-10.1)


 


Total Bilirubin


 0.4 mg/dL


(0.2-1.0) 


 


 





 


Aspartate Amino Transf


(AST/SGOT) 38 U/L (15-37) 


 


 


 





 


Alanine Aminotransferase


(ALT/SGPT) 39 U/L (14-59) 


 


 


 





 


Alkaline Phosphatase


 95 U/L


() 


 


 





 


Total Protein


 5.5 g/dL


(6.4-8.2) 


 


 





 


Albumin


 1.6 g/dL


(3.4-5.0) 


 


 





 


Albumin/Globulin Ratio 0.4 (1.0-1.7)    


 


Glucose (Fingerstick)


 


 142 mg/dL


(70-99) 162 mg/dL


(70-99) 





 


Phosphorus Level


 


 


 


 3.8 mg/dL


(2.6-4.7)


 


Magnesium Level


 


 


 


 2.0 mg/dL


(1.8-2.4)


 


Test


 5/11/20


05:06 5/11/20


12:05 


 





 


Glucose (Fingerstick)


 126 mg/dL


(70-99) 181 mg/dL


(70-99) 


 











Medications





Active Scripts








 Medications  Dose


 Route/Sig


 Max Daily Dose Days Date Category


 


 Bisoprolol


 Fumarate 5 Mg


 Tablet  10 Mg


 PO DAILY


   3/16/20 Reported








Comments


CXR  5/9/2020





IMPRESSION: 


1. Stable diffuse infiltrate and moderate pleural effusions.


2. Stable support lines and tubes.





KUB 5/9/2020


IMPRESSION: 


1. Stable to slight increased distended air-filled loops of bowel 


throughout the abdomen. The transition point is seen. Correlate for ileus.


2. Multiple surgical drains overlying the abdomen.





Impression


.


IMPRESSION:


1.  Acute hypoxemic respiratory failure secondary to ARDS status post trach,


2.  Gallstone pancreatitis


3.  Severe metabolic acidosis.stable


4.  Acute kidney injury-stable, ON HD-- continue to improve 


5.  Acute gallstone pancreatitis.


6.  Hypoalbuminemia.


7.  Moderate persistent effusions


8.  Fever-  Per ID, per surgery--resolved 


9.  Chronic anemia


10. Covid 19 testing negative


11. Moderate to large ascites-S/P paracentisis


12.S/P paracentisis with 4 liters removed on 4/15/20


13. S/P IR drain placement on 5/8/2020





Plan


.


Patient over the last couple of days has deteriorated a bit, will continue 

pressure support


She was off mechanical ventilation for a couple of days


Follow surgery recs-- S/P 3 drain placed in IR on 5/8/2020


Follow ID recs for ABX


Follow nephrology recs 


Continue TPN 


DVT/GI PPX: heparin SQ/ protonix 


D/W RN and RT





CODE:FULL











STEVE MIRANDA MD              May 11, 2020 14:05

## 2020-05-11 NOTE — PDOC
Infectious Disease Note


Subjective


Subjective


feeling better says


Remains on vent/trach FiO2 30% PEEP 5


On TPN





ROS


ROS


no n/v/d/pain





Vital Sign


Vital Signs





Vital Signs








  Date Time  Temp Pulse Resp B/P (MAP) Pulse Ox O2 Delivery O2 Flow Rate FiO2


 


5/11/20 07:01     98 Ventilator  


 


5/11/20 06:49   15     


 


5/11/20 06:00  101  134/81 (98)    


 


5/11/20 04:00 98.4       





 98.4       











Physical Exam


PHYSICAL EXAM


GENERAL: Propped up in bed, appears weak, tired


HEENT:  oral cavity dry, NGT


NECK:  Trach/vent 


LUNGS: Improved aeration


HEART:  S1, S2, regular 


ABDOMEN: Distended, hypoactive BS, tender, + drains x 3


: [place] (4/14)


EXTREMITIES: Generalized edema, no cyanosis, SCDs bilaterally 


SKIN: Warm and dry.  No generalized rash.  


CNS: Nodded some to couple questions 


PICC(4/30) clean





Labs


Lab





Laboratory Tests








Test


 5/10/20


12:40 5/10/20


15:20 5/10/20


17:44 5/11/20


00:13


 


Glucose (Fingerstick)


 149 mg/dL


(70-99) 


 142 mg/dL


(70-99) 162 mg/dL


(70-99)


 


Sodium Level


 


 146 mmol/L


(136-145) 


 





 


Potassium Level


 


 3.4 mmol/L


(3.5-5.1) 


 





 


Chloride Level


 


 106 mmol/L


() 


 





 


Carbon Dioxide Level


 


 33 mmol/L


(21-32) 


 





 


Anion Gap  7 (6-14)   


 


Blood Urea Nitrogen


 


 28 mg/dL


(7-20) 


 





 


Creatinine


 


 0.9 mg/dL


(0.6-1.0) 


 





 


Estimated GFR


(Cockcroft-Gault) 


 66.5 


 


 





 


BUN/Creatinine Ratio  31 (6-20)   


 


Glucose Level


 


 166 mg/dL


(70-99) 


 





 


Calcium Level


 


 8.2 mg/dL


(8.5-10.1) 


 





 


Total Bilirubin


 


 0.4 mg/dL


(0.2-1.0) 


 





 


Aspartate Amino Transf


(AST/SGOT) 


 38 U/L (15-37) 


 


 





 


Alanine Aminotransferase


(ALT/SGPT) 


 39 U/L (14-59) 


 


 





 


Alkaline Phosphatase


 


 95 U/L


() 


 





 


Total Protein


 


 5.5 g/dL


(6.4-8.2) 


 





 


Albumin


 


 1.6 g/dL


(3.4-5.0) 


 





 


Albumin/Globulin Ratio  0.4 (1.0-1.7)   


 


Test


 5/11/20


05:00 5/11/20


05:06 


 





 


Sodium Level


 145 mmol/L


(136-145) 


 


 





 


Potassium Level


 3.8 mmol/L


(3.5-5.1) 


 


 





 


Chloride Level


 106 mmol/L


() 


 


 





 


Carbon Dioxide Level


 33 mmol/L


(21-32) 


 


 





 


Anion Gap 6 (6-14)    


 


Blood Urea Nitrogen


 29 mg/dL


(7-20) 


 


 





 


Creatinine


 0.7 mg/dL


(0.6-1.0) 


 


 





 


Estimated GFR


(Cockcroft-Gault) 88.9 


 


 


 





 


Glucose Level


 125 mg/dL


(70-99) 


 


 





 


Calcium Level


 8.4 mg/dL


(8.5-10.1) 


 


 





 


Phosphorus Level


 3.8 mg/dL


(2.6-4.7) 


 


 





 


Magnesium Level


 2.0 mg/dL


(1.8-2.4) 


 


 





 


Glucose (Fingerstick)


 


 126 mg/dL


(70-99) 


 











Micro








Objective


Assessment


Fever  - better currently - intermittent could be from underlying pancreatitis 

blood cults 5/4 - neg so far


? Ileus with vomiting


Abd distention - U/S and CT reviewed s/p 0.4 L of opaque, debris-containing 

ascites was removed 5/6


Acute pancreatitis with persistent necrosis


  - 4/27 status post ROBERT drain placement + C paropsilosis. s/p additional drains 

5/8


Anemia - S/p PRBCs


Cholelithiasis with thickening of the gallbladder wall.


Leucocytosis improving


JED, hyperkalemia, Metabolic acidosis off dialysis


Acute hypoxic resp failure ,bilateral pleural effusion and atelectasis


hypocalcemia 


Prediabetes


HTN


s/p trach





Plan


Plan of Care


Continue Dapto 5/4, merrem 4/8 -try to wean soon 


Continue micafungin


f/u cultures 


Maintain aspiration precaution


Supportive care











LINN FRANZ MD               May 11, 2020 07:44

## 2020-05-11 NOTE — PDOC
PROGRESS NOTES


Assessment


Assessment


Respiratory failure.


Seizure.


Metabolic encephalopathy.


Fever, recurrent, T100.5 degree on 4/26/20.


Metabolic acidosis.


Diffuse pulmonary infiltrate.


Pleural effusion.


Pancreatitis, necrotizing.


Gallstone.


Leukocytosis.


Lymphopenia.


Electrolytes imbalances.


Hyperglycemia.


DM.


HTN.


HLD.


Anemia.


Abnormal CXR.


Obesity.





RECOMMENDATIONS/PLAN:


Continue life support in CCU at the present time.


Keppra if has further seizures.


Treat medical diseases.


OT/PT.





EEG: No seizure activity.





OBJECTIVE:


5/11/20: No seizures reported over night.





Past Medical History


Cardiovascular:  HTN, Hyperlipidemia


Endocrine:  Diabetes





Family History


Unobtainable.





Social HistoryU


not obtainable





Allergies


Coded Allergies:  


Codeine (Verified  Allergy, Intermediate, rash, 3/16/20)





ROS


Unobtainable.





NEUROLOGICAL  EXAMINATION:


Sleepiness.


Not fully oriented to time, place and person.


PERRL.


EOMI not examined.


CN: no acute focal findings.


Muscle tone: Decreased.


Muscle strength: 3 UE, 2-3 LE.


DTR: 1-2


Plantar reflex: Neutral response bilaterally 


Gait: not able to walk.


Sensory exam: no response to stimuli..


Not able to access cerebellar signs.


F-T-N test not performed. 


Patient seen in CCU.





Objective


Objective





Vital Signs








  Date Time  Temp Pulse Resp B/P (MAP) Pulse Ox O2 Delivery O2 Flow Rate FiO2


 


5/11/20 15:30     97 Ventilator  


 


5/11/20 15:00  106 20 119/72 (88)    


 


5/11/20 12:00 98.0       





 98.0       














Intake and Output 


 


 5/11/20





 06:59


 


Intake Total 3219 ml


 


Output Total 4855 ml


 


Balance -1636 ml


 


 


 


IV Total 3219 ml


 


Output Urine Total 4495 ml


 


Drainage Total 360 ml











Vitals Signs


Vitals





                          VS - Last 72 Hours, by Label








  Date Time  Temp Pulse Resp B/P (MAP) Pulse Ox O2 Delivery O2 Flow Rate FiO2


 


5/11/20 15:30     97 Ventilator  


 


5/11/20 15:00  106 20 119/72 (88) 98 Ventilator  


 


5/11/20 14:00  102 18 144/92 (109) 98 Ventilator  


 


5/11/20 13:00  104 16 154/96 (115) 98 Ventilator  


 


5/11/20 12:00 98.0 110 17 165/96 (119) 97 Ventilator  





 98.0       


 


5/11/20 12:00      Mechanical Ventilator  


 


5/11/20 12:00   21  97 Ventilator  


 


5/11/20 11:30   18  97 Ventilator  


 


5/11/20 11:19     97 Ventilator  


 


5/11/20 11:00  100 16 156/85 (108) 97 Ventilator  


 


5/11/20 10:00  92 15 140/82 (101) 99 Ventilator  


 


5/11/20 09:00  88 16 114/66 (82) 99 Ventilator  


 


5/11/20 08:00      Mechanical Ventilator  


 


5/11/20 08:00 98.2 98 16 125/67 (86) 98 Ventilator  





 98.2       


 


5/11/20 07:01     98 Ventilator  


 


5/11/20 07:00  97 17 137/65 (89) 98 Ventilator  


 


5/11/20 06:49   15   Ventilator  


 


5/11/20 06:19   19   Ventilator  


 


5/11/20 06:00  101 16 134/81 (98) 99 Ventilator  


 


5/11/20 05:27     97 Ventilator  


 


5/11/20 05:00  83 19 122/72 (89) 99 Ventilator  


 


5/11/20 04:00      Mechanical Ventilator  


 


5/11/20 04:00 98.4 90 17 127/68 (87) 98 Ventilator  





 98.4       


 


5/11/20 03:00  85 20 135/78 (97) 99 Ventilator  


 


5/11/20 02:00  91 22 127/72 (90) 98 Ventilator  


 


5/11/20 01:20     97 Ventilator  


 


5/11/20 01:00  94 21 136/66 (89) 97 Ventilator  


 


5/11/20 00:00      Mechanical Ventilator  


 


5/11/20 00:00 98.0 95 19 149/79 (102) 95 Ventilator  





 98.0       


 


5/10/20 23:41   22   Ventilator  


 


5/10/20 23:11   23   Ventilator  


 


5/10/20 23:00  89 22 156/73 (100) 97 Ventilator  


 


5/10/20 22:42     98 Ventilator  


 


5/10/20 22:00  103 19 147/74 (98) 96 Ventilator  


 


5/10/20 21:00  99 23 140/80 (100) 96 Ventilator  


 


5/10/20 20:00 97.8 97 21 127/78 (94) 98 Ventilator  





 97.8       


 


5/10/20 20:00      Mechanical Ventilator  


 


5/10/20 19:36     98 Ventilator  


 


5/10/20 19:13   19   Ventilator  


 


5/10/20 19:00  99 19 138/68 (91) 97 Ventilator  


 


5/10/20 18:43   21  99 Ventilator  


 


5/10/20 18:00  108 16 156/73 (100) 97 Ventilator  


 


5/10/20 17:23     98 Ventilator  


 


5/10/20 17:00  96 17 140/65 (90) 97 Ventilator  


 


5/10/20 16:00      Mechanical Ventilator  


 


5/10/20 16:00 98.7 104 18 138/78 (98) 99 Ventilator  





 98.7       


 


5/10/20 15:30     98 Ventilator  


 


5/10/20 15:00  92 18 131/64 (86) 96 Ventilator  


 


5/10/20 14:00  100 18 128/69 (88) 99 Ventilator  


 


5/10/20 13:00  102 22 146/76 (99) 97 Ventilator  


 


5/10/20 12:40   18  99 Ventilator  


 


5/10/20 12:37     98 Ventilator  


 


5/10/20 12:10   21  98 Ventilator  


 


5/10/20 12:00 98.7 104 21 138/78 (98) 97 Ventilator  





 98.7       


 


5/10/20 12:00      Mechanical Ventilator  


 


5/10/20 11:00  106 16 126/78 (94) 97 Ventilator  


 


5/10/20 10:00  104 17 136/75 (95) 97 Ventilator  


 


5/10/20 09:35     97 Ventilator  


 


5/10/20 09:00  102 19 140/73 (95) 97 Ventilator  


 


5/10/20 08:41   20  98 Ventilator  


 


5/10/20 08:18     97 Ventilator  


 


5/10/20 08:11   19  96 Ventilator  


 


5/10/20 08:00      Mechanical Ventilator  


 


5/10/20 08:00 98.9 103 22 149/85 (106) 95 Ventilator  





 98.9       


 


5/10/20 07:00  104 21 136/70 (92) 97 Ventilator  











Laboratory


Laboratory





Laboratory Tests








Test


 5/11/20


00:13 5/11/20


05:00 5/11/20


05:06 5/11/20


12:05


 


Glucose (Fingerstick)


 162 mg/dL


(70-99) 


 126 mg/dL


(70-99) 181 mg/dL


(70-99)


 


Sodium Level


 


 145 mmol/L


(136-145) 


 





 


Potassium Level


 


 3.8 mmol/L


(3.5-5.1) 


 





 


Chloride Level


 


 106 mmol/L


() 


 





 


Carbon Dioxide Level


 


 33 mmol/L


(21-32) 


 





 


Anion Gap  6 (6-14)   


 


Blood Urea Nitrogen


 


 29 mg/dL


(7-20) 


 





 


Creatinine


 


 0.7 mg/dL


(0.6-1.0) 


 





 


Estimated GFR


(Cockcroft-Gault) 


 88.9 


 


 





 


Glucose Level


 


 125 mg/dL


(70-99) 


 





 


Calcium Level


 


 8.4 mg/dL


(8.5-10.1) 


 





 


Phosphorus Level


 


 3.8 mg/dL


(2.6-4.7) 


 





 


Magnesium Level


 


 2.0 mg/dL


(1.8-2.4) 


 











Microbiology


5/6/20 Aerobic and Anaerobic Culture - Preliminary, Resulted


         


5/6/20 Anaerobic Culture Result 1 (ANSON) - Preliminary, Resulted


         


5/6/20 Aerobic Culture - Final, Resulted


         


5/6/20 Aerobic Culture Result 1 ([facility]SON) - Final, Resulted


         


5/6/20 Gram Stain - Final, Resulted


         


5/6/20 Gram Stain Result 1 (ANSON) - Final, Resulted


         


5/6/20 Gram Stain Result 2 ([facility]SON) - Final, Resulted


         


5/4/20 Blood Culture - Final, Complete


         NO GROWTH AFTER 5 DAYS


4/30/20 Aerobic Culture - Final, Complete


          


4/30/20 Aerobic Culture Result 1 (ANSON) - Final, Complete


          


4/30/20 Gram Stain - Final, Complete


          


4/30/20 Gram Stain Result 1 (ANSON) - Final, Complete


          


4/30/20 Gram Stain Result 2 (ANSON) - Final, Complete


          


4/12/20 Urine Culture - Final, Complete


          


4/12/20 Urine Culture Result 1 (ANSON) - Final, Complete





Medication


Medications





Current Medications


Potassium Chloride 80 meq/ Magnesium Sulfate 20 meq/ Multivitamins 10 

ml/Chromium/ Copper/Manganese/ Seleni/Zn 0.5 ml/ Insulin Human Regular 15 unit/ 

Total Parenteral Nutrition/Amino Acids/Dextrose/ Fat Emulsion Intravenous 1,920 

ml @  80 mls/hr TPN  CONT IV  Last administered on 5/10/20at 21:42;  Start 

5/10/20 at 22:00;  Stop 5/11/20 at 21:59


Potassium Chloride 80 meq/ Magnesium Sulfate 20 meq/ Multivitamins 10 

ml/Chromium/ Copper/Manganese/ Seleni/Zn 0.5 ml/ Insulin Human Regular 15 unit/ 

Total Parenteral Nutrition/Amino Acids/Dextrose/ Fat Emulsion Intravenous 1,920 

ml @  80 mls/hr TPN  CONT IV ;  Start 5/11/20 at 22:00;  Stop 5/12/20 at 21:59





Comment


Review of Relevant


I have reviewed the following items josy (where applicable) has been applied.











DORYS LANDA MD                 May 11, 2020 17:58

## 2020-05-11 NOTE — PDOC
Subjective:


Subjective:


Has abd pain and nausea.





Objective:


Objective:


D/w nurse - passed gas, nausea improved w/ Reglan, not much from NG, daughters 

got to visit for Mother's Day.


Vital Signs:





                                   Vital Signs








  Date Time  Temp Pulse Resp B/P (MAP) Pulse Ox O2 Delivery O2 Flow Rate FiO2


 


5/11/20 11:30   18  97 Ventilator  


 


5/11/20 06:00  101  134/81 (98)    


 


5/11/20 04:00 98.4       





 98.4       








Labs:





Laboratory Tests








Test


 5/10/20


12:40 5/10/20


15:20 5/10/20


17:44 5/11/20


00:13


 


Glucose (Fingerstick) 149 mg/dL   142 mg/dL  162 mg/dL 


 


Sodium Level  146 mmol/L   


 


Potassium Level  3.4 mmol/L   


 


Chloride Level  106 mmol/L   


 


Carbon Dioxide Level  33 mmol/L   


 


Anion Gap  7   


 


Blood Urea Nitrogen  28 mg/dL   


 


Creatinine  0.9 mg/dL   


 


Estimated GFR


(Cockcroft-Gault) 


 66.5 


 


 





 


BUN/Creatinine Ratio  31   


 


Glucose Level  166 mg/dL   


 


Calcium Level  8.2 mg/dL   


 


Total Bilirubin  0.4 mg/dL   


 


Aspartate Amino Transf


(AST/SGOT) 


 38 U/L 


 


 





 


Alanine Aminotransferase


(ALT/SGPT) 


 39 U/L 


 


 





 


Alkaline Phosphatase  95 U/L   


 


Total Protein  5.5 g/dL   


 


Albumin  1.6 g/dL   


 


Albumin/Globulin Ratio  0.4   


 


Test


 5/11/20


05:00 5/11/20


05:06 5/11/20


12:05 





 


Sodium Level 145 mmol/L    


 


Potassium Level 3.8 mmol/L    


 


Chloride Level 106 mmol/L    


 


Carbon Dioxide Level 33 mmol/L    


 


Anion Gap 6    


 


Blood Urea Nitrogen 29 mg/dL    


 


Creatinine 0.7 mg/dL    


 


Estimated GFR


(Cockcroft-Gault) 88.9 


 


 


 





 


Glucose Level 125 mg/dL    


 


Calcium Level 8.4 mg/dL    


 


Phosphorus Level 3.8 mg/dL    


 


Magnesium Level 2.0 mg/dL    


 


Glucose (Fingerstick)  126 mg/dL  181 mg/dL  








  ANAEROBIC-AEROBIC CULTURE  


                                PENDING





  ANAEROBIC RES 1  


                                PENDING





  AEROBIC CULT  Preliminary  


        Preliminary report





  AEROBIC RES 1  Preliminary  


        Yeast isolated.





        4+


        Request for further identification must be made


        within 1 week.





  GRAM STAIN  Final  


        Final report





  GRAM STAIN RES 1  Final  


        Comment





        No white blood cells seen.





  GRAM STAIN RES 2  Final  


        No organisms seen





 BLOOD CULTURE  Final  


        NO GROWTH AFTER 5 DAYS


Imaging:


IR procedure 5/8


Impression:


1. CT-guided placement of 3 intra-abdominal drains with aspiration of 2300 cc of

thin turbid brown fluid and necrotic debris as described. 





KUB 5/9


IMPRESSION: 


1. Stable to slight increased distended air-filled loops of bowel throughout the

abdomen. The transition point is seen. Correlate for ileus.


2. Multiple surgical drains overlying the abdomen.





CXR 5/9


IMPRESSION: 


1. Stable diffuse infiltrate and moderate pleural effusions.


2. Stable support lines and tubes.





PE:





GEN: NAD


LUNGS: trach/vent


HEART: RRR


ABD: NGT, distended, drains, uncomfortable - seems a bit better


NEURO/PSYCH: A & O 3





A/P:


Gallstone pancreatitis, MOSF





--


Other per Dr. Bhatia.





Hemodynamically unstable?:  No


Is patient in severe pain?:  Yes


Is NPO status required?:  Yes











RAGHAVENDRA MURCIA         May 11, 2020 12:44

## 2020-05-11 NOTE — NUR
SS following up with discharge planning. SS reviewed pt chart and discussed with RN, Valentina. 
Pt has trach. Still not able to tolerate speaking valve and is NPO and on TPN. Pt has three 
IV antibiotics. Pt's drains remain and are still continuing to drain. Per RN, pt did have 
flatus today. SS will continue to follow for discharge planning.

## 2020-05-11 NOTE — PDOC
SURGICAL PROGRESS NOTE


Subjective


Tom for Dr Mark Hollingsworth awake, asking questions





per RN reflux/emesis less with addition of Reglan


Vital Signs





Vital Signs








  Date Time  Temp Pulse Resp B/P (MAP) Pulse Ox O2 Delivery O2 Flow Rate FiO2


 


5/11/20 11:30   18  97 Ventilator  


 


5/11/20 06:00  101  134/81 (98)    


 


5/11/20 04:00 98.4       





 98.4       








I&O











Intake and Output 


 


 5/11/20





 07:00


 


Intake Total 3219 ml


 


Output Total 4855 ml


 


Balance -1636 ml


 


 


 


IV Total 3219 ml


 


Output Urine Total 4495 ml


 


Drainage Total 360 ml








PATIENT HAS A VILLASENOR:  Yes (accurate I and O)


General:  Alert


Abdomen:  Soft, Other (drains in place)


Skin:  Other (warm, dry)


Labs





Laboratory Tests








Test


 5/9/20


13:34 5/9/20


18:32 5/10/20


00:54 5/10/20


06:15


 


Glucose (Fingerstick)


 182 mg/dL


(70-99) 139 mg/dL


(70-99) 156 mg/dL


(70-99) 





 


White Blood Count


 


 


 


 10.3 x10^3/uL


(4.0-11.0)


 


Red Blood Count


 


 


 


 2.53 x10^6/uL


(3.50-5.40)


 


Hemoglobin


 


 


 


 7.3 g/dL


(12.0-15.5)


 


Hematocrit


 


 


 


 22.5 %


(36.0-47.0)


 


Mean Corpuscular Volume    89 fL () 


 


Mean Corpuscular Hemoglobin    29 pg (25-35) 


 


Mean Corpuscular Hemoglobin


Concent 


 


 


 32 g/dL


(31-37)


 


Red Cell Distribution Width


 


 


 


 17.8 %


(11.5-14.5)


 


Platelet Count


 


 


 


 439 x10^3/uL


(140-400)


 


Sodium Level


 


 


 


 148 mmol/L


(136-145)


 


Potassium Level


 


 


 


 3.4 mmol/L


(3.5-5.1)


 


Chloride Level


 


 


 


 109 mmol/L


()


 


Carbon Dioxide Level


 


 


 


 31 mmol/L


(21-32)


 


Anion Gap    8 (6-14) 


 


Blood Urea Nitrogen


 


 


 


 30 mg/dL


(7-20)


 


Creatinine


 


 


 


 0.7 mg/dL


(0.6-1.0)


 


Estimated GFR


(Cockcroft-Gault) 


 


 


 88.9 





 


BUN/Creatinine Ratio    43 (6-20) 


 


Glucose Level


 


 


 


 135 mg/dL


(70-99)


 


Calcium Level


 


 


 


 8.2 mg/dL


(8.5-10.1)


 


Magnesium Level


 


 


 


 1.6 mg/dL


(1.8-2.4)


 


Total Bilirubin


 


 


 


 0.5 mg/dL


(0.2-1.0)


 


Aspartate Amino Transf


(AST/SGOT) 


 


 


 40 U/L (15-37) 





 


Alanine Aminotransferase


(ALT/SGPT) 


 


 


 41 U/L (14-59) 





 


Alkaline Phosphatase


 


 


 


 123 U/L


()


 


Total Protein


 


 


 


 5.4 g/dL


(6.4-8.2)


 


Albumin


 


 


 


 1.6 g/dL


(3.4-5.0)


 


Albumin/Globulin Ratio    0.4 (1.0-1.7) 


 


Test


 5/10/20


06:23 5/10/20


12:40 5/10/20


15:20 5/10/20


17:44


 


Glucose (Fingerstick)


 131 mg/dL


(70-99) 149 mg/dL


(70-99) 


 142 mg/dL


(70-99)


 


Sodium Level


 


 


 146 mmol/L


(136-145) 





 


Potassium Level


 


 


 3.4 mmol/L


(3.5-5.1) 





 


Chloride Level


 


 


 106 mmol/L


() 





 


Carbon Dioxide Level


 


 


 33 mmol/L


(21-32) 





 


Anion Gap   7 (6-14)  


 


Blood Urea Nitrogen


 


 


 28 mg/dL


(7-20) 





 


Creatinine


 


 


 0.9 mg/dL


(0.6-1.0) 





 


Estimated GFR


(Cockcroft-Gault) 


 


 66.5 


 





 


BUN/Creatinine Ratio   31 (6-20)  


 


Glucose Level


 


 


 166 mg/dL


(70-99) 





 


Calcium Level


 


 


 8.2 mg/dL


(8.5-10.1) 





 


Total Bilirubin


 


 


 0.4 mg/dL


(0.2-1.0) 





 


Aspartate Amino Transf


(AST/SGOT) 


 


 38 U/L (15-37) 


 





 


Alanine Aminotransferase


(ALT/SGPT) 


 


 39 U/L (14-59) 


 





 


Alkaline Phosphatase


 


 


 95 U/L


() 





 


Total Protein


 


 


 5.5 g/dL


(6.4-8.2) 





 


Albumin


 


 


 1.6 g/dL


(3.4-5.0) 





 


Albumin/Globulin Ratio   0.4 (1.0-1.7)  


 


Test


 5/11/20


00:13 5/11/20


05:00 5/11/20


05:06 





 


Glucose (Fingerstick)


 162 mg/dL


(70-99) 


 126 mg/dL


(70-99) 





 


Sodium Level


 


 145 mmol/L


(136-145) 


 





 


Potassium Level


 


 3.8 mmol/L


(3.5-5.1) 


 





 


Chloride Level


 


 106 mmol/L


() 


 





 


Carbon Dioxide Level


 


 33 mmol/L


(21-32) 


 





 


Anion Gap  6 (6-14)   


 


Blood Urea Nitrogen


 


 29 mg/dL


(7-20) 


 





 


Creatinine


 


 0.7 mg/dL


(0.6-1.0) 


 





 


Estimated GFR


(Cockcroft-Gault) 


 88.9 


 


 





 


Glucose Level


 


 125 mg/dL


(70-99) 


 





 


Calcium Level


 


 8.4 mg/dL


(8.5-10.1) 


 





 


Phosphorus Level


 


 3.8 mg/dL


(2.6-4.7) 


 





 


Magnesium Level


 


 2.0 mg/dL


(1.8-2.4) 


 











Laboratory Tests








Test


 5/10/20


12:40 5/10/20


15:20 5/10/20


17:44 5/11/20


00:13


 


Glucose (Fingerstick)


 149 mg/dL


(70-99) 


 142 mg/dL


(70-99) 162 mg/dL


(70-99)


 


Sodium Level


 


 146 mmol/L


(136-145) 


 





 


Potassium Level


 


 3.4 mmol/L


(3.5-5.1) 


 





 


Chloride Level


 


 106 mmol/L


() 


 





 


Carbon Dioxide Level


 


 33 mmol/L


(21-32) 


 





 


Anion Gap  7 (6-14)   


 


Blood Urea Nitrogen


 


 28 mg/dL


(7-20) 


 





 


Creatinine


 


 0.9 mg/dL


(0.6-1.0) 


 





 


Estimated GFR


(Cockcroft-Gault) 


 66.5 


 


 





 


BUN/Creatinine Ratio  31 (6-20)   


 


Glucose Level


 


 166 mg/dL


(70-99) 


 





 


Calcium Level


 


 8.2 mg/dL


(8.5-10.1) 


 





 


Total Bilirubin


 


 0.4 mg/dL


(0.2-1.0) 


 





 


Aspartate Amino Transf


(AST/SGOT) 


 38 U/L (15-37) 


 


 





 


Alanine Aminotransferase


(ALT/SGPT) 


 39 U/L (14-59) 


 


 





 


Alkaline Phosphatase


 


 95 U/L


() 


 





 


Total Protein


 


 5.5 g/dL


(6.4-8.2) 


 





 


Albumin


 


 1.6 g/dL


(3.4-5.0) 


 





 


Albumin/Globulin Ratio  0.4 (1.0-1.7)   


 


Test


 5/11/20


05:00 5/11/20


05:06 


 





 


Sodium Level


 145 mmol/L


(136-145) 


 


 





 


Potassium Level


 3.8 mmol/L


(3.5-5.1) 


 


 





 


Chloride Level


 106 mmol/L


() 


 


 





 


Carbon Dioxide Level


 33 mmol/L


(21-32) 


 


 





 


Anion Gap 6 (6-14)    


 


Blood Urea Nitrogen


 29 mg/dL


(7-20) 


 


 





 


Creatinine


 0.7 mg/dL


(0.6-1.0) 


 


 





 


Estimated GFR


(Cockcroft-Gault) 88.9 


 


 


 





 


Glucose Level


 125 mg/dL


(70-99) 


 


 





 


Calcium Level


 8.4 mg/dL


(8.5-10.1) 


 


 





 


Phosphorus Level


 3.8 mg/dL


(2.6-4.7) 


 


 





 


Magnesium Level


 2.0 mg/dL


(1.8-2.4) 


 


 





 


Glucose (Fingerstick)


 


 126 mg/dL


(70-99) 


 











Problem List


Problems


Medical Problems:


(1) Acute pancreatitis


Status: Acute  





(2) Cholelithiasis


Status: Acute  








Assessment/Plan


severe pancreatitis


s/p surgical/percutaneous drain placements





continue supportive care











KIEL BAL MD                May 11, 2020 11:50

## 2020-05-11 NOTE — PDOC
[facility] PROGRESS NOTE


Chief Complaint


Chief Complaint


Acute hypoxic Respiratory failure requiring mechanical ventilation (on vent 

since 3/23)


Tracheostomy


bilateral pleural effusions/pulm edema 


Sepsis


Severe Acute gallstone pancreatitis (not a surgical candidate at this time) with

necrosis


Acute kidney failure now requiring dialysis


Salpingitis


Gallstones (Calculus of gallbladder with acute cholecystitis without 

obstruction)


HTN 


Leukocytosis 


Hypoxia


Uterine fibroid


Intractable pain


Intractable nausea


Covid 19 negative. 


Acute on chronic anemia 


EEG: No seizure activityFever  - better currently - intermittent could be from 

underlying pancreatitis blood cults 5/4 - neg so far


? Ileus with vomiting


Abd distention - U/S and CT reviewed s/p 0.4 L of opaque, debris-containing 

ascites was removed 5/6


Acute pancreatitis with persistent necrosis


- 4/27 status post ROBERT drain placement + C paropsilosis. s/p additional drains 

5/8


Anemia - S/p PRBCs


Cholelithiasis with thickening of the gallbladder wall.


Leucocytosis improving


JED, hyperkalemia, Metabolic acidosis off dialysis


Acute hypoxic resp failure ,bilateral pleural effusion and atelectasis


hypocalcemia 


Prediabetes


HTN


s/p trach


ESRD on HD


Hyperglycemia





History of Present Illness


History of Present Illness


5/11/2020


Patient seen and examined


She remains on the vent but spontaneous respirations with 20 of pressure support

and 30% FiO2


He still has NG to suction


Appears extremely ill and weak and confused


Has IV TPN Precedex antibiotics


[place] is to bedside drainage


She has SCDs and ProCare boots


She is on IV meropenem and daptomycin and micafungin


Chart reviewed


Discussed with RN


Patient is still critically ill








Ms Tadeo is a 50yo F w/ PMHx HTN, prediabetes who presented to the emergency 

room with complaints of abdominal pain on 3/16/2020. Found with Lipase 04378, 

, , Bilirubin 1.4.


CT abdomen confirms pancreatic inflammation, peripancreatic fluid and 

inflammatory changes around the pancreas consistent with pancreatitis. 

Cholelithiasis and 1.4cm uterine fibroid as well as possible left salpingitis. 

Admitted for further care


GI, General surgery, ID, Pulm consulted.





3/17: PICC placed per IR. Renal US negative. Started on levophed. Repeat CT 

abdomen w/ necrosis; 3/18: Dialysis catheter per nephrology; 3/19: On BiPAP; 

3/20: BiPAP, dialysis; 3/21: Overnight Tmax 101.7 , still on BiPAP FiO2 40%, 

still on low dose Levophed gtt, TPN initiated. On dialysis


4/6: Tracheostomy; 4/12: S/p tracheostomy on vent spontaneous respirations with 

5 of pressure support 35% FiO2, rectal tube and a Chino, off pressors; 

4/14:Still on vent via trach. Removed PICC and CVC LIJ and replaced. CT 

chest/abd/pelvis with bilateral pleural effusion and ascites.


4/15: Renal function stable. Still on vent. More interactive today. Miming wish 

for food. Plan discussed for thoracentesis/paracentesis with daughters today. 

They were under impression patient was doing worse due to a miscommunication 

which has been clarified over the phone. 4.3L removed.


4/17: Febrile overnight 101.8F. More interactive, still on vent. Asking for ice 

by miming; 4/18: Afebrile overnight. TMax last 24 hours 100.6F. Hb 7.1. 

Interactive when awake. 4/22: Transfusion 1u PRBC (6U total since admit)


4/23-4/26: TPN and precedex, vent.


4/27: Tmax 101F overnight. Hb 8.2. HD cath out since 4/24. Alert. On vent SIMV 

35% FiO2. Surgery: ex-lap, no naif or pancreatic necrosectomy 2/2 profound 

inflammation.


4/28: Seen POD #1. Afebrile overnight. BUN 62. CBC WNL today.Remains on vent via

trach, TPN. Able to point today and indicate she wishes her daughters and Rolf 

to be involved in her care.


4/29: Hb 7.4, Na 151. Remains on vent via trach, TPN. off HD for now. Not 

tolerating trach shield well.


4/30: Negative US for UE DVT. More relaxed today. Has some increase in UOP. 

Tolerating vent well. She is requesting to eat and drink via writing. T max 100F

24 hours ago, but 101F at 1200. Right PICC placed. Speaking valve for half the 

day in whisper


5/1: Na 153 today. Good UOP. Tmax 101F at 1200 on 4/30. She becomes a bit 

anxious and did not sleep much last night, wishes to try to sleep this morning


5/2: Able to speak well with speaking valve today. Na 153, K2.9, Mg 1.8, Cr 1. 

100.4F axillary temp overnight. Asking for food.


5/3: Afebrile overnight. ABG with pH 7.27/75/123. Hb 7.1. Na 153. PCA settings 

decreased


5/4: No acute events reported overnight, case discussed with nursing staff 

patient in no acute distress no complaints during my visit


5/5: Patient responding to verbal stimuli.  She is saturating well and not 

requiring ventilation support, no acute events reported overnight seems to be 

slowly improving


5/6: Patient requiring transfusion of packed red blood cells due to anemia, no 

evidence of acute bleeding, appreciate GI consultant recommendations


5/7: Patient required ventilatory support given that she desaturated, she is 

lying in bed in mild distress, case discussed with nursing staff, no evidence of

bleeding, h an h noted. 


5/8: Underwent IR procedure as follows


BRIEF OPERATIVE NOTE


Pre-Op Diagnosis


Pancreatitis with pseudocysts, suspected infection


Post-Op Diagnosis


same


Procedure Performed


CT abdominal Drains x 3


Surgeon


Hardy


Anesthesia Type:  Conscious Sedation


Findings


3 abdominal drains, 14F, with turbid pancreatic fluid and necrotic debris in 

each.


Complications


No immediate








5/9: Patient today somewhat restless and having bilious secretions from ET tube,

imaging studies ordered, discussed with consultant. Pretty poor prognosis, 

hopefully is not a fistula, poor surgical candidate. 





5/10: Imaging with no acute events, she seems more stable today compared to 

yesterday. Encouraged as much activity as possible patient at high risk for 

severe depression.





Vitals/I&O


Vitals/I&O:





                                   Vital Signs








  Date Time  Temp Pulse Resp B/P (MAP) Pulse Ox O2 Delivery O2 Flow Rate FiO2


 


5/11/20 07:01     98 Ventilator  


 


5/11/20 06:49   15     


 


5/11/20 06:00  101  134/81 (98)    


 


5/11/20 04:00 98.4       





 98.4       














                                    I & O   


 


 5/10/20 5/10/20 5/11/20





 15:00 23:00 07:00


 


Intake Total   3219 ml


 


Output Total 2875 ml 1120 ml 860 ml


 


Balance -2875 ml -1120 ml 2359 ml











Physical Exam


Physical Exam:


GENERAL: Propped up in bed, appears weak, tired


HEENT:  oral cavity dry, NGT


NECK:  Trach/vent 


LUNGS: Improved aeration


HEART:  S1, S2, regular 


ABDOMEN: Distended, hypoactive BS, tender, + drains x 3


: [place] (4/14)


EXTREMITIES: Generalized edema, no cyanosis, SCDs bilaterally 


SKIN: Warm and dry.  No generalized rash.  


CNS: Nodded some to couple questions 


PICC(4/30) clean


General:  Alert


Heart:  Regular rate, Normal S1, Normal S2, No murmurs, Gallops


Lungs:  Crackles


Abdomen:  Soft


Extremities:  No clubbing, No cyanosis, No edema, Normal pulses, No 

tenderness/swelling


Skin:  Other (mottling noted to extremities )





Labs


Labs:





Laboratory Tests








Test


 5/10/20


12:40 5/10/20


15:20 5/10/20


17:44 5/11/20


00:13


 


Glucose (Fingerstick)


 149 mg/dL


(70-99) 


 142 mg/dL


(70-99) 162 mg/dL


(70-99)


 


Sodium Level


 


 146 mmol/L


(136-145) 


 





 


Potassium Level


 


 3.4 mmol/L


(3.5-5.1) 


 





 


Chloride Level


 


 106 mmol/L


() 


 





 


Carbon Dioxide Level


 


 33 mmol/L


(21-32) 


 





 


Anion Gap  7 (6-14)   


 


Blood Urea Nitrogen


 


 28 mg/dL


(7-20) 


 





 


Creatinine


 


 0.9 mg/dL


(0.6-1.0) 


 





 


Estimated GFR


(Cockcroft-Gault) 


 66.5 


 


 





 


BUN/Creatinine Ratio  31 (6-20)   


 


Glucose Level


 


 166 mg/dL


(70-99) 


 





 


Calcium Level


 


 8.2 mg/dL


(8.5-10.1) 


 





 


Total Bilirubin


 


 0.4 mg/dL


(0.2-1.0) 


 





 


Aspartate Amino Transf


(AST/SGOT) 


 38 U/L (15-37) 


 


 





 


Alanine Aminotransferase


(ALT/SGPT) 


 39 U/L (14-59) 


 


 





 


Alkaline Phosphatase


 


 95 U/L


() 


 





 


Total Protein


 


 5.5 g/dL


(6.4-8.2) 


 





 


Albumin


 


 1.6 g/dL


(3.4-5.0) 


 





 


Albumin/Globulin Ratio  0.4 (1.0-1.7)   


 


Test


 5/11/20


05:00 5/11/20


05:06 


 





 


Sodium Level


 145 mmol/L


(136-145) 


 


 





 


Potassium Level


 3.8 mmol/L


(3.5-5.1) 


 


 





 


Chloride Level


 106 mmol/L


() 


 


 





 


Carbon Dioxide Level


 33 mmol/L


(21-32) 


 


 





 


Anion Gap 6 (6-14)    


 


Blood Urea Nitrogen


 29 mg/dL


(7-20) 


 


 





 


Creatinine


 0.7 mg/dL


(0.6-1.0) 


 


 





 


Estimated GFR


(Cockcroft-Gault) 88.9 


 


 


 





 


Glucose Level


 125 mg/dL


(70-99) 


 


 





 


Calcium Level


 8.4 mg/dL


(8.5-10.1) 


 


 





 


Phosphorus Level


 3.8 mg/dL


(2.6-4.7) 


 


 





 


Magnesium Level


 2.0 mg/dL


(1.8-2.4) 


 


 





 


Glucose (Fingerstick)


 


 126 mg/dL


(70-99) 


 














Assessment and Plan


Assessmemt and Plan


Problems


Medical Problems:


(1) Acute pancreatitis


Status: Acute  





(2) Cholelithiasis


Status: Acute  





Acute hypoxic Respiratory failure requiring mechanical ventilation (on vent 

since 3/23)


Tracheostomy


bilateral pleural effusions/pulm edema 


Sepsis


Severe Acute gallstone pancreatitis (not a surgical candidate at this time) with

necrosis


Acute kidney failure now requiring dialysis


Salpingitis


Gallstones (Calculus of gallbladder with acute cholecystitis without 

obstruction)


HTN 


Leukocytosis 


Hypoxia


Uterine fibroid


Intractable pain


Intractable nausea


Covid 19 negative. 


Acute on chronic anemia 


EEG: No seizure activityFever  - better currently - intermittent could be from 

underlying pancreatitis blood cults 5/4 - neg so far


? Ileus with vomiting


Abd distention - U/S and CT reviewed s/p 0.4 L of opaque, debris-containing 

ascites was removed 5/6


Acute pancreatitis with persistent necrosis


- 4/27 status post ROBERT drain placement + C paropsilosis. s/p additional drains 5 /8


Anemia - S/p PRBCs


Cholelithiasis with thickening of the gallbladder wall.


Leucocytosis improving


JED, hyperkalemia, Metabolic acidosis off dialysis


Acute hypoxic resp failure ,bilateral pleural effusion and atelectasis


hypocalcemia 


Prediabetes


HTN


s/p trach


ESRD on HD


Hyperglycemia








Plan


ICU monitoring


Wound care


NG suctioning


Continue IV antibiotics and micafungin


Sedation with Precedex


TPN protocol


Continue [place] to bedside drainage


Tracheostomy care


Continue vent weaning and hope to progress towards decannulation at some point 

but she is still extremely ill


Trend labs


Appreciate subspecialist input


She is critically ill


Total time 33-minute





Comment


Review of Relevant


I have reviewed the following items josy (where applicable) has been applied.


Medications:





Current Medications








 Medications


  (Trade)  Dose


 Ordered  Sig/Yee


 Route


 PRN Reason  Start Time


 Stop Time Status Last Admin


Dose Admin


 


 Magnesium Sulfate  50 ml @ 25


 mls/hr  1X  ONCE


 IV


   5/10/20 09:00


 5/10/20 10:59 DC 5/10/20 10:44





 


 Potassium


 Chloride/Water  100 ml @ 


 100 mls/hr  1X  ONCE


 IV


   5/10/20 09:00


 5/10/20 09:59 DC 5/10/20 09:37





 


 Duloxetine HCl


  (Cymbalta)  30 mg  DAILY


 PO


   5/10/20 14:00


    5/10/20 17:50





 


 Potassium


 Chloride 80 meq/


 Magnesium Sulfate


 20 meq/


 Multivitamins 10


 ml/Chromium/


 Copper/Manganese/


 Seleni/Zn 0.5 ml/


 Insulin Human


 Regular 15 unit/


 Total Parenteral


 Nutrition/Amino


 Acids/Dextrose/


 Fat Emulsion


 Intravenous  1,920 ml @ 


 80 mls/hr  TPN  CONT


 IV


   5/10/20 22:00


 5/11/20 21:59  5/10/20 21:42














Hemodynamically unstable?:  No


Is patient in severe pain?:  Yes


Is NPO status required?:  Yes











HECTOR MASON III DO           May 11, 2020 09:00

## 2020-05-11 NOTE — PDOC
SUBJECTIVE


ROS


stable





OBJECTIVE


Vital Signs





Vital Signs








  Date Time  Temp Pulse Resp B/P (MAP) Pulse Ox O2 Delivery O2 Flow Rate FiO2


 


5/11/20 07:01     98 Ventilator  


 


5/11/20 06:49   15     


 


5/11/20 06:00  101  134/81 (98)    


 


5/11/20 04:00 98.4       





 98.4       








I & 0











Intake and Output 


 


 5/11/20





 07:00


 


Intake Total 3219 ml


 


Output Total 4855 ml


 


Balance -1636 ml


 


 


 


IV Total 3219 ml


 


Output Urine Total 4495 ml


 


Drainage Total 360 ml











PHYSICAL EXAM


Physical Exam


GENERAL: Propped up in bed


HEENT:  OM moist 


NECK:  Trach/vent 


LUNGS: Improved aeration


HEART:  S1, S2,  


ABDOMEN: Distended, hypoactive BS, tender, + drains x 3


: [place] +


EXTREMITIES: Generalized edema, 


SKIN  No generalized rash.  


CNS: Nods





DIAGNOSIS/ASSESSMENT


Assessment & Plan


ARF-- ATN,requiring CRRT and HD , Resolved 


 avoid nephrotoxins


  


Anemia-  Currently on YOLI 





Hypernatremia -- resolved  





Anasarca due to 3rd spacing ,  IV Bumex prn 





Hyperkalemia- resolved  





Acute pancreatitis with persistent  necrosis- Persistent evidence of necrotizing

pancreatitis with fluid and phlegmon   at the pancreas


   4/27 status post ROBERT drain placement; 





 Cholelithiasis with possible cholecystitis.





Moderate pleural effusions, may be slightly improved. Infiltrate/atelectasis in 

both lung bases.


  


 Ascites - post paracentesis  in the past


 s/p  paracentesis 5/6  





Acute hypoxic resp failure ,bilateral pleural effusion


  Trach in place,


 


HTN 





COVID-19 neg, 3/26





Will sign off





COMMENT/RELEVANT DATA


Meds





Current Medications








 Medications


  (Trade)  Dose


 Ordered  Sig/Yee  Start Time


 Stop Time Status Last Admin


Dose Admin


 


 Acetaminophen


  (Tylenol Supp)  650 mg  PRN Q6HRS  PRN  3/24/20 10:30


    5/5/20 09:12


650 MG


 


 Acetaminophen


  (Tylenol)  650 mg  PRN Q6HRS  PRN  3/21/20 03:36


    4/16/20 19:56


650 MG


 


 Albumin Human  200 ml @ 


 200 mls/hr  1X PRN  PRN  4/21/20 08:00


 4/21/20 13:59 DC 4/21/20 08:40


200 MLS/HR


 


 Albuterol Sulfate


  (Ventolin Neb


 Soln)  2.5 mg  1X  ONCE  3/17/20 22:30


 3/17/20 22:31 DC 3/18/20 00:56


2.5 MG


 


 Alteplase,


 Recombinant


  (Cathflo For


 Central Catheter


 Clearance)  1 mg  1X  ONCE  4/24/20 10:45


 4/24/20 10:46 DC 4/24/20 11:44


1 MG


 


 Amino Acids/


 Glycerin/


 Electrolytes  1,000 ml @ 


 75 mls/hr  [id number]  4/20/20 21:15


   UNV  





 


 Artificial Tears


  (Artificial


 Tears)  1 drop  PRN Q15MIN  PRN  4/29/20 05:30


    5/8/20 22:00


1 DROP


 


 Atenolol


  (Tenormin)  100 mg  DAILY  3/17/20 09:00


 3/16/20 20:08 DC  





 


 Atropine Sulfate


  (ATROPINE 0.5mg


 SYRINGE)  0.5 mg  PRN Q5MIN  PRN  4/2/20 08:15


     





 


 Benzocaine


  (Hurricaine One)  1 spray  1X  ONCE  3/20/20 14:30


 3/20/20 14:31 DC 3/20/20 16:38


1 SPRAY


 


 Bisacodyl


  (Dulcolax Supp)  10 mg  STK-MED ONCE  4/27/20 10:59


 4/27/20 10:59 DC  





 


 Bumetanide


  (Bumex)  2 mg  DAILY  5/8/20 10:00


    5/11/20 09:48


2 MG


 


 Bupivacaine HCl/


 Epinephrine Bitart


  (Sensorcain-Epi


 0.5%-1:447356 Mpf)  30 ml  STK-MED ONCE  4/27/20 10:58


 4/27/20 10:58 DC 4/27/20 12:01


7 ML


 


 Calcium Carbonate/


 Glycine


  (Tums)  500 mg  PRN [id number]  PRN  3/18/20 17:45


     





 


 Calcium Chloride


 1000 mg/Sodium


 Chloride  110 ml @ 


 220 mls/hr  1X  ONCE  3/17/20 22:30


 3/17/20 22:59 DC 3/17/20 22:11


220 MLS/HR


 


 Calcium Chloride


 3000 mg/Sodium


 Chloride  1,030 ml @ 


 50 mls/hr  [id number]  3/19/20 08:00


 3/21/20 15:23 DC 3/21/20 02:17


50 MLS/HR


 


 Calcium Gluconate


  (Calcium


 Gluconate)  2,000 mg  1X  ONCE  3/19/20 02:15


 3/19/20 02:16 DC 3/19/20 02:19


2,000 MG


 


 Calcium Gluconate


 1000 mg/Sodium


 Chloride  110 ml @ 


 220 mls/hr  1X  ONCE  3/18/20 03:30


 3/18/20 03:59 DC 3/18/20 03:21


220 MLS/HR


 


 Calcium Gluconate


 2000 mg/Sodium


 Chloride  120 ml @ 


 220 mls/hr  1X  ONCE  3/18/20 07:30


 3/18/20 08:02 DC 3/18/20 09:05


220 MLS/HR


 


 Cefepime HCl


  (Maxipime)  2 gm  Q12HR  3/25/20 09:00


 4/8/20 09:58 DC 4/7/20 20:56


2 GM


 


 Cellulose


  (Surgicel


 Fibrillar 1x2)  1 each  STK-MED ONCE  4/6/20 11:00


 4/6/20 11:01 DC  





 


 Cellulose


  (Surgicel


 Hemostat 2x14)  1 each  STK-MED ONCE  4/27/20 10:58


 4/27/20 10:59 DC  





 


 Cellulose


  (Surgicel


 Hemostat 4x8)  1 each  STK-MED ONCE  4/27/20 10:58


 4/27/20 10:59 DC  





 


 Cyclobenzaprine


 HCl


  (Flexeril)  10 mg  PRN Q6HRS  PRN  4/30/20 10:45


     





 


 Daptomycin 430 mg/


 Sodium Chloride  50 ml @ 


 100 mls/hr  Q24H  4/25/20 13:00


 4/30/20 20:58 DC 4/30/20 13:00


100 MLS/HR


 


 Daptomycin 485 mg/


 Sodium Chloride  50 ml @ 


 100 mls/hr  Q24H  5/4/20 11:00


    5/10/20 12:09


100 MLS/HR


 


 Daptomycin 500 mg/


 Sodium Chloride  50 ml @ 


 100 mls/hr  Q48H  3/25/20 08:30


 4/10/20 10:07 DC 4/10/20 09:57


100 MLS/HR


 


 Dexamethasone


 Sodium Phosphate


  (Decadron)  4 mg  STK-MED ONCE  4/27/20 10:56


 4/27/20 10:57 DC  





 


 Dexmedetomidine


 HCl 400 mcg/


 Sodium Chloride  100 ml @ 0


 mls/hr  CONT  PRN  4/2/20 08:15


    5/11/20 04:40


16.1 MLS/HR


 


 Dextrose


  (Dextrose


 50%-Water Syringe)  12.5 gm  PRN Q15MIN  PRN  3/16/20 09:30


     





 


 Digoxin


  (Lanoxin)  125 mcg  1X  ONCE  3/19/20 18:00


 3/19/20 18:01 DC 3/19/20 17:10


125 MCG


 


 Diphenhydramine


 HCl


  (Benadryl)  25 mg  1X PRN  PRN  4/24/20 15:45


 4/25/20 15:44 DC  





 


 Duloxetine HCl


  (Cymbalta)  30 mg  DAILY  5/10/20 14:00


    5/11/20 09:48


30 MG


 


 Enoxaparin Sodium


  (Lovenox 100mg


 Syringe)  100 mg  Q12HR  4/21/20 21:00


   UNV  





 


 Etomidate


  (Amidate)  8 mg  1X  ONCE  3/23/20 08:30


 3/23/20 08:31 DC 3/23/20 08:33


8 MG


 


 Fentanyl Citrate


  (Fentanyl 2ml


 Vial)  100 mcg  STK-MED ONCE  4/27/20 10:56


 4/27/20 10:57 DC  





 


 Fentanyl Citrate


  (Fentanyl 5ml


 Vial)  250 mcg  1X  ONCE  5/8/20 09:15


 5/8/20 09:16 DC 5/8/20 09:30


50 MCG


 


 Furosemide


  (Lasix)  40 mg  1X  ONCE  4/13/20 14:30


 4/13/20 14:31 DC 4/13/20 14:39


40 MG


 


 Haloperidol


 Lactate


  (Haldol Inj)  3 mg  1X  ONCE  5/4/20 14:30


 5/4/20 14:31 DC 5/4/20 14:37


3 MG


 


 Heparin Sodium


  (Porcine)


  (Hep Lock Adult)  500 unit  STK-MED ONCE  4/7/20 09:29


 4/7/20 09:30 DC  





 


 Heparin Sodium


  (Porcine)


  (Heparin Sodium)  5,000 unit  Q12HR  4/27/20 21:00


 5/7/20 09:59 DC 5/6/20 20:57


5,000 UNIT


 


 Heparin Sodium


  (Porcine) 1000


 unit/Sodium


 Chloride  1,001 ml @ 


 1,001 mls/hr  1X  ONCE  4/27/20 12:00


 4/27/20 12:59 DC  





 


 Hydromorphone HCl


  (Dilaudid


 Standard PCA)  12 mg  STK-MED ONCE  4/30/20 13:47


 5/1/20 11:03 DC  





 


 Hydromorphone HCl


  (Dilaudid)  1 mg  PRN Q4HRS  PRN  5/4/20 19:00


    5/11/20 06:19


1 MG


 


 Info


  (CONTRAST GIVEN


 -- Rx MONITORING)  1 each  PRN DAILY  PRN  3/30/20 11:45


 4/1/20 11:44 DC  





 


 Info


  (Icu Electrolyte


 Protocol)  1 ea  CONT PRN  PRN  3/29/20 13:15


     





 


 Info


  (PHARMACY


 MONITORING -- do


 not chart)  1 each  PRN DAILY  PRN  4/24/20 15:45


     





 


 Info


  (Tpn Per


 Pharmacy)  1 each  PRN DAILY  PRN  3/18/20 12:30


   UNV  





 


 Insulin Human


 Lispro


  (HumaLOG)  0-9 UNITS  Q6HRS  3/16/20 09:30


    5/11/20 00:30


4 UNITS


 


 Insulin Human


 Regular


  (HumuLIN R VIAL)  5 unit  1X  ONCE  3/17/20 22:30


 3/17/20 22:31 DC 3/17/20 22:14


5 UNIT


 


 Iohexol


  (Omnipaque 240


 Mg/ml)  30 ml  1X  ONCE  3/30/20 11:30


 3/30/20 11:33 DC 3/30/20 11:30


30 ML


 


 Iohexol


  (Omnipaque 300


 Mg/ml)  50 ml  STK-MED ONCE  4/27/20 10:58


 4/27/20 10:59 DC  





 


 Iohexol


  (Omnipaque 350


 Mg/ml)  90 ml  1X  ONCE  3/16/20 03:30


 3/16/20 03:31 DC 3/16/20 03:25


90 ML


 


 Ketorolac


 Tromethamine


  (Toradol 30mg


 Vial)  30 mg  1X  ONCE  3/16/20 03:00


 3/16/20 03:01 DC 3/16/20 02:54


30 MG


 


 Lidocaine HCl


  (Buffered


 Lidocaine 1%)  3 ml  1X  ONCE  5/8/20 09:15


 5/8/20 09:16 DC 5/8/20 09:30


16 ML


 


 Lidocaine HCl


  (Glydo


  (Lidocaine) Jelly)  1 thomas  1X  ONCE  3/20/20 14:30


 3/20/20 14:31 DC 3/20/20 16:38


1 THOMAS


 


 Lidocaine HCl


  (Xylocaine-Mpf


 1% 2ml Vial)  2 ml  PRN 1X  PRN  4/27/20 07:00


 4/28/20 06:59 DC  





 


 Linezolid/Dextrose  300 ml @ 


 300 mls/hr  Q12HR  4/10/20 11:00


 4/21/20 08:10 DC 4/20/20 20:40


300 MLS/HR


 


 Lorazepam


  (Ativan Inj)  1 mg  PRN Q4HRS  PRN  3/19/20 09:00


 4/17/20 09:19 DC 4/17/20 03:51


1 MG


 


 Magnesium Sulfate  50 ml @ 25


 mls/hr  1X  ONCE  5/10/20 09:00


 5/10/20 10:59 DC 5/10/20 10:44


25 MLS/HR


 


 Meropenem 1 gm/


 Sodium Chloride  100 ml @ 


 200 mls/hr  Q8HRS  4/28/20 14:00


    5/11/20 06:05


200 MLS/HR


 


 Meropenem 500 mg/


 Sodium Chloride  50 ml @ 


 100 mls/hr  Q12H  4/8/20 10:00


 4/28/20 12:37 DC 4/28/20 10:45


100 MLS/HR


 


 Metoclopramide HCl


  (Reglan Vial)  10 mg  PRN Q3HRS  PRN  5/9/20 16:45


    5/11/20 04:40


10 MG


 


 Metoprolol


 Tartrate


  (Lopressor Vial)  5 mg  Q6HRS  3/17/20 10:15


 3/28/20 08:48 DC 3/26/20 00:12


5 MG


 


 Metronidazole  100 ml @ 


 100 mls/hr  Q8HRS  4/14/20 10:00


 4/21/20 08:10 DC 4/21/20 06:04


100 MLS/HR


 


 Micafungin Sodium


 100 mg/Dextrose  100 ml @ 


 100 mls/hr  Q24H  5/4/20 11:00


    5/10/20 12:14


100 MLS/HR


 


 Midazolam HCl


  (Versed)  5 mg  1X  ONCE  5/8/20 09:15


 5/8/20 09:16 DC 5/8/20 09:30


1 MG


 


 Midazolam HCl 100


 mg/Sodium Chloride  100 ml @ 7


 mls/hr  CONT  PRN  3/28/20 16:00


    4/8/20 15:35


7 MLS/HR


 


 Midazolam HCl 50


 mg/Sodium Chloride  50 ml @ 0


 mls/hr  CONT  PRN  3/23/20 08:15


 3/28/20 15:59 DC 3/26/20 22:39


7 MLS/HR


 


 Morphine Sulfate


  (Morphine


 Sulfate)  2 mg  PRN Q2HR  PRN  3/16/20 05:00


 3/17/20 14:15 DC 3/17/20 12:26


2 MG


 


 Multi-Ingred


 Cream/Lotion/Oil/


 Oint


  (Artificial


 Tears Eye


 Ointment)  1 thomas  PRN Q1HR  PRN  3/25/20 17:30


    4/13/20 08:19


1 THOMAS


 


 Naloxone HCl


  (Narcan)  0.4 mg  PRN Q2MIN  PRN  4/27/20 14:30


   UNV  





 


 Norepinephrine


 Bitartrate 8 mg/


 Dextrose  258 ml @ 


 17.299 mls/


 hr  CONT  PRN  3/17/20 15:30


 4/17/20 09:19 DC 4/14/20 12:48


20.9 MLS/HR


 


 Ondansetron HCl


  (Zofran)  4 mg  STK-MED ONCE  4/27/20 10:56


 4/27/20 10:57 DC  





 


 Pantoprazole


 Sodium


  (PROTONIX VIAL


 for IV PUSH)  40 mg  DAILYAC  3/16/20 11:30


    5/11/20 09:47


40 MG


 


 Phenylephrine HCl


  (PHENYLEPHRINE


 in 0.9% NACL PF)  1 mg  STK-MED ONCE  4/27/20 12:34


 4/27/20 12:34 DC  





 


 Piperacillin Sod/


 Tazobactam Sod


 4.5 gm/Sodium


 Chloride  100 ml @ 


 200 mls/hr  1X  ONCE  3/16/20 06:00


 3/16/20 06:29 DC 3/16/20 05:44


200 MLS/HR


 


 Potassium


 Chloride 15 meq/


 Bicarbonate


 Dialysis Soln w/


 out KCl  5,007.5 ml


  @ 1,000 mls/


 hr  Q5H1M  3/29/20 20:00


 4/2/20 13:08 DC 4/1/20 18:14


1,000 MLS/HR


 


 Potassium


 Chloride 20 meq/


 Bicarbonate


 Dialysis Soln w/


 out KCl  5,010 ml @ 


 1,000 mls/hr  Q5H1M  3/25/20 16:00


 3/29/20 19:59 DC 3/29/20 14:54


1,000 MLS/HR


 


 Potassium


 Chloride 75 meq/


 Magnesium Sulfate


 15 meq/


 Multivitamins 10


 ml/Chromium/


 Copper/Manganese/


 Seleni/Zn 0.5 ml/


 Insulin Human


 Regular 15 unit/


 Total Parenteral


 Nutrition/Amino


 Acids/Dextrose/


 Fat Emulsion


 Intravenous  1,920 ml @ 


 80 mls/hr  TPN  CONT  5/9/20 22:00


 5/10/20 21:59 DC 5/9/20 22:41


80 MLS/HR


 


 Potassium


 Chloride 75 meq/


 Magnesium Sulfate


 15 meq/Calcium


 Gluconate 8 meq/


 Multivitamins 10


 ml/Chromium/


 Copper/Manganese/


 Seleni/Zn 0.5 ml/


 Insulin Human


 Regular 15 unit/


 Total Parenteral


 Nutrition/Amino


 Acids/Dextrose/


 Fat Emulsion


 Intravenous  1,920 ml @ 


 80 mls/hr  TPN  CONT  5/7/20 22:00


 5/8/20 21:59 DC 5/7/20 22:28


80 MLS/HR


 


 Potassium


 Chloride 75 meq/


 Magnesium Sulfate


 15 meq/Calcium


 Gluconate 8 meq/


 Multivitamins 10


 ml/Chromium/


 Copper/Manganese/


 Seleni/Zn 0.5 ml/


 Insulin Human


 Regular 20 unit/


 Total Parenteral


 Nutrition/Amino


 Acids/Dextrose/


 Fat Emulsion


 Intravenous  1,920 ml @ 


 80 mls/hr  TPN  CONT  5/6/20 22:00


 5/7/20 21:59 DC 5/6/20 22:00


80 MLS/HR


 


 Potassium


 Chloride 75 meq/


 Magnesium Sulfate


 15 meq/Calcium


 Gluconate 8 meq/


 Multivitamins 10


 ml/Chromium/


 Copper/Manganese/


 Seleni/Zn 0.5 ml/


 Insulin Human


 Regular 25 unit/


 Total Parenteral


 Nutrition/Amino


 Acids/Dextrose/


 Fat Emulsion


 Intravenous  1,920 ml @ 


 80 mls/hr  TPN  CONT  5/4/20 22:00


 5/5/20 21:59 DC 5/4/20 23:08


80 MLS/HR


 


 Potassium


 Chloride 75 meq/


 Magnesium Sulfate


 20 meq/Calcium


 Gluconate 10 meq/


 Multivitamins 10


 ml/Chromium/


 Copper/Manganese/


 Seleni/Zn 0.5 ml/


 Insulin Human


 Regular 25 unit/


 Total Parenteral


 Nutrition/Amino


 Acids/Dextrose/


 Fat Emulsion


 Intravenous  1,920 ml @ 


 80 mls/hr  TPN  CONT  5/3/20 22:00


 5/4/20 21:59 DC 5/3/20 22:04


80 MLS/HR


 


 Potassium


 Chloride 75 meq/


 Magnesium Sulfate


 20 meq/Calcium


 Gluconate 10 meq/


 Multivitamins 10


 ml/Chromium/


 Copper/Manganese/


 Seleni/Zn 0.5 ml/


 Insulin Human


 Regular 30 unit/


 Total Parenteral


 Nutrition/Amino


 Acids/Dextrose/


 Fat Emulsion


 Intravenous  1,920 ml @ 


 80 mls/hr  TPN  CONT  5/2/20 22:00


 5/3/20 22:00 DC 5/2/20 21:51


80 MLS/HR


 


 Potassium


 Chloride 80 meq/


 Magnesium Sulfate


 20 meq/


 Multivitamins 10


 ml/Chromium/


 Copper/Manganese/


 Seleni/Zn 0.5 ml/


 Insulin Human


 Regular 15 unit/


 Total Parenteral


 Nutrition/Amino


 Acids/Dextrose/


 Fat Emulsion


 Intravenous  1,920 ml @ 


 80 mls/hr  TPN  CONT  5/10/20 22:00


 5/11/20 21:59  5/10/20 21:42


80 MLS/HR


 


 Potassium


 Chloride/Water  100 ml @ 


 100 mls/hr  1X  ONCE  5/10/20 09:00


 5/10/20 09:59 DC 5/10/20 09:37


100 MLS/HR


 


 Potassium


 Phosphate 20 mmol/


 Sodium Chloride  106.6667


 ml @ 


 51.667 m...  1X  ONCE  3/25/20 13:00


 3/25/20 15:03 DC 3/25/20 12:51


51.667 MLS/HR


 


 Potassium Acetate


 30 meq/Magnesium


 Sulfate 20 meq/


 Calcium Gluconate


 10 meq/


 Multivitamins 10


 ml/Chromium/


 Copper/Manganese/


 Seleni/Zn 0.5 ml/


 Insulin Human


 Regular 30 unit/


 Potassium


 Chloride 30 meq/


 Total Parenteral


 Nutrition/Amino


 Acids/Dextrose/


 Fat Emulsion


 Intravenous  1,920 ml @ 


 80 mls/hr  TPN  CONT  5/1/20 22:00


 5/2/20 21:59 DC 5/1/20 22:34


80 MLS/HR


 


 Potassium Acetate


 55 meq/Magnesium


 Sulfate 20 meq/


 Calcium Gluconate


 10 meq/


 Multivitamins 10


 ml/Chromium/


 Copper/Manganese/


 Seleni/Zn 0.5 ml/


 Insulin Human


 Regular 30 unit/


 Total Parenteral


 Nutrition/Amino


 Acids/Dextrose/


 Fat Emulsion


 Intravenous  1,920 ml @ 


 80 mls/hr  TPN  CONT  4/30/20 22:00


 5/1/20 21:59 DC 5/1/20 01:00


80 MLS/HR


 


 Potassium Acetate


 55 meq/Magnesium


 Sulfate 20 meq/


 Calcium Gluconate


 10 meq/


 Multivitamins 10


 ml/Chromium/


 Copper/Manganese/


 Seleni/Zn 0.5 ml/


 Insulin Human


 Regular 35 unit/


 Total Parenteral


 Nutrition/Amino


 Acids/Dextrose/


 Fat Emulsion


 Intravenous  1,920 ml @ 


 80 mls/hr  TPN  CONT  4/28/20 22:00


 4/29/20 21:59 DC 4/28/20 22:02


80 MLS/HR


 


 Potassium Acetate


 65 meq/Magnesium


 Sulfate 20 meq/


 Calcium Gluconate


 10 meq/


 Multivitamins 10


 ml/Chromium/


 Copper/Manganese/


 Seleni/Zn 0.5 ml/


 Insulin Human


 Regular 30 unit/


 Total Parenteral


 Nutrition/Amino


 Acids/Dextrose/


 Fat Emulsion


 Intravenous  1,920 ml @ 


 80 mls/hr  TPN  CONT  4/29/20 22:00


 4/30/20 21:59 DC 4/29/20 22:22


80 MLS/HR


 


 Prochlorperazine


 Edisylate


  (Compazine)  5 mg  PACU PRN  PRN  4/27/20 07:00


 4/28/20 06:59 DC  





 


 Propofol  20 ml @ As


 Directed  STK-MED ONCE  4/27/20 12:26


 4/27/20 12:27 DC  





 


 Ringer's Solution  1,000 ml @ 


 30 mls/hr  Q24H  4/27/20 07:00


 4/27/20 18:59 DC  





 


 Rocuronium Bromide


  (Zemuron)  50 mg  STK-MED ONCE  4/27/20 10:56


 4/27/20 10:57 DC  





 


 Sevoflurane


  (Ultane)  60 ml  STK-MED ONCE  4/27/20 12:26


 4/27/20 12:27 DC  





 


 Sodium


 Bicarbonate 50


 meq/Sodium


 Chloride  1,050 ml @ 


 75 mls/hr  Q14H  3/18/20 07:30


 3/23/20 10:28 DC 3/22/20 21:10


75 MLS/HR


 


 Sodium Acetate 50


 meq/Potassium


 Acetate 55 meq/


 Magnesium Sulfate


 20 meq/Calcium


 Gluconate 10 meq/


 Multivitamins 10


 ml/Chromium/


 Copper/Manganese/


 Seleni/Zn 0.5 ml/


 Insulin Human


 Regular 35 unit/


 Total Parenteral


 Nutrition/Amino


 Acids/Dextrose/


 Fat Emulsion


 Intravenous  1,800 ml @ 


 75 mls/hr  TPN  CONT  4/25/20 22:00


 4/26/20 21:59 DC 4/25/20 22:03


75 MLS/HR


 


 Sodium Chloride  1,000 ml @ 


 25 mls/hr  Q24H  4/27/20 14:30


   UNV  





 


 Sodium Chloride


  (Normal Saline


 Flush)  3 ml  QSHIFT  PRN  4/27/20 13:45


     





 


 Sodium Chloride


 90 meq/Calcium


 Gluconate 10 meq/


 Multivitamins 10


 ml/Chromium/


 Copper/Manganese/


 Seleni/Zn 0.5 ml/


 Total Parenteral


 Nutrition/Amino


 Acids/Dextrose/


 Fat Emulsion


 Intravenous  1,512 ml @ 


 63 mls/hr  TPN  CONT  3/18/20 22:00


 3/19/20 21:59 DC 3/18/20 22:06


63 MLS/HR


 


 Sodium Chloride


 90 meq/Calcium


 Gluconate 10 meq/


 Multivitamins 10


 ml/Chromium/


 Copper/Manganese/


 Seleni/Zn 1 ml/


 Total Parenteral


 Nutrition/Amino


 Acids/Dextrose/


 Fat Emulsion


 Intravenous  55.005 ml


  @ 2.292


 mls/hr  TPN  CONT  3/18/20 22:00


 3/18/20 12:33 DC  





 


 Sodium Chloride


 90 meq/Magnesium


 Sulfate 10 meq/


 Calcium Gluconate


 20 meq/


 Multivitamins 10


 ml/Chromium/


 Copper/Manganese/


 Seleni/Zn 0.5 ml/


 Total Parenteral


 Nutrition/Amino


 Acids/Dextrose/


 Fat Emulsion


 Intravenous  1,512 ml @ 


 63 mls/hr  TPN  CONT  3/19/20 22:00


 3/20/20 21:59 DC 3/19/20 22:25


63 MLS/HR


 


 Sodium Chloride


 90 meq/Magnesium


 Sulfate 12 meq/


 Calcium Gluconate


 15 meq/


 Multivitamins 10


 ml/Chromium/


 Copper/Manganese/


 Seleni/Zn 0.5 ml/


 Insulin Human


 Regular 25 unit/


 Total Parenteral


 Nutrition/Amino


 Acids/Dextrose/


 Fat Emulsion


 Intravenous  1,400 ml @ 


 58.333 mls/


 hr  TPN  CONT  4/8/20 22:00


 4/9/20 21:59 DC 4/8/20 21:41


58.333 MLS/HR


 


 Sodium Chloride


 90 meq/Potassium


 Chloride 15 meq/


 Magnesium Sulfate


 12 meq/Calcium


 Gluconate 15 meq/


 Multivitamins 10


 ml/Chromium/


 Copper/Manganese/


 Seleni/Zn 0.5 ml/


 Insulin Human


 Regular 25 unit/


 Total Parenteral


 Nutrition/Amino


 Acids/Dextrose/


 Fat Emulsion


 Intravenous  1,400 ml @ 


 58.333 mls/


 hr  TPN  CONT  4/7/20 22:00


 4/8/20 21:59 DC 4/7/20 22:13


58.333 MLS/HR


 


 Sodium Chloride


 90 meq/Potassium


 Chloride 15 meq/


 Potassium


 Phosphate 10 mmol/


 Magnesium Sulfate


 8 meq/Calcium


 Gluconate 15 meq/


 Multivitamins 10


 ml/Chromium/


 Copper/Manganese/


 Seleni/Zn 0.5 ml/


 Insulin Human


 Regular 25 unit/


 Total Parenteral


 Nutrition/Amino


 Acids/Dextrose/


 Fat Emulsion


 Intravenous  1,400 ml @ 


 58.333 mls/


 hr  TPN  CONT  4/5/20 22:00


 4/6/20 21:59 DC 4/5/20 21:20


58.333 MLS/HR


 


 Sodium Chloride


 90 meq/Potassium


 Chloride 15 meq/


 Potassium


 Phosphate 10 mmol/


 Magnesium Sulfate


 10 meq/Calcium


 Gluconate 20 meq/


 Multivitamins 10


 ml/Chromium/


 Copper/Manganese/


 Seleni/Zn 0.5 ml/


 Total Parenteral


 Nutrition/Amino


 Acids/Dextrose/


 Fat Emulsion


 Intravenous  1,400 ml @ 


 58.333 mls/


 hr  TPN  CONT  3/23/20 22:00


 3/24/20 21:59 DC 3/23/20 21:42


58.333 MLS/HR


 


 Sodium Chloride


 90 meq/Potassium


 Chloride 15 meq/


 Potassium


 Phosphate 10 mmol/


 Magnesium Sulfate


 12 meq/Calcium


 Gluconate 15 meq/


 Multivitamins 10


 ml/Chromium/


 Copper/Manganese/


 Seleni/Zn 0.5 ml/


 Insulin Human


 Regular 25 unit/


 Total Parenteral


 Nutrition/Amino


 Acids/Dextrose/


 Fat Emulsion


 Intravenous  1,400 ml @ 


 58.333 mls/


 hr  TPN  CONT  4/6/20 22:00


 4/7/20 21:59 DC 4/6/20 22:24


58.333 MLS/HR


 


 Sodium Chloride


 90 meq/Potassium


 Chloride 15 meq/


 Potassium


 Phosphate 15 mmol/


 Magnesium Sulfate


 10 meq/Calcium


 Gluconate 15 meq/


 Multivitamins 10


 ml/Chromium/


 Copper/Manganese/


 Seleni/Zn 0.5 ml/


 Total Parenteral


 Nutrition/Amino


 Acids/Dextrose/


 Fat Emulsion


 Intravenous  1,400 ml @ 


 58.333 mls/


 hr  TPN  CONT  3/24/20 22:00


 3/25/20 21:59 DC 3/24/20 22:17


58.333 MLS/HR


 


 Sodium Chloride


 90 meq/Potassium


 Chloride 15 meq/


 Potassium


 Phosphate 15 mmol/


 Magnesium Sulfate


 10 meq/Calcium


 Gluconate 20 meq/


 Multivitamins 10


 ml/Chromium/


 Copper/Manganese/


 Seleni/Zn 0.5 ml/


 Total Parenteral


 Nutrition/Amino


 Acids/Dextrose/


 Fat Emulsion


 Intravenous  1,200 ml @ 


 50 mls/hr  TPN  CONT  3/22/20 22:00


 3/22/20 14:17 DC  





 


 Sodium Chloride


 90 meq/Potassium


 Chloride 15 meq/


 Potassium


 Phosphate 18 mmol/


 Magnesium Sulfate


 8 meq/Calcium


 Gluconate 15 meq/


 Multivitamins 10


 ml/Chromium/


 Copper/Manganese/


 Seleni/Zn 0.5 ml/


 Insulin Human


 Regular 10 unit/


 Total Parenteral


 Nutrition/Amino


 Acids/Dextrose/


 Fat Emulsion


 Intravenous  1,400 ml @ 


 58.333 mls/


 hr  TPN  CONT  3/27/20 22:00


 3/28/20 21:59 DC 3/27/20 21:43


58.333 MLS/HR


 


 Sodium Chloride


 90 meq/Potassium


 Chloride 15 meq/


 Potassium


 Phosphate 18 mmol/


 Magnesium Sulfate


 8 meq/Calcium


 Gluconate 15 meq/


 Multivitamins 10


 ml/Chromium/


 Copper/Manganese/


 Seleni/Zn 0.5 ml/


 Insulin Human


 Regular 15 unit/


 Total Parenteral


 Nutrition/Amino


 Acids/Dextrose/


 Fat Emulsion


 Intravenous  1,400 ml @ 


 58.333 mls/


 hr  TPN  CONT  3/30/20 22:00


 3/31/20 21:59 DC 3/30/20 21:47


58.333 MLS/HR


 


 Sodium Chloride


 90 meq/Potassium


 Chloride 15 meq/


 Potassium


 Phosphate 18 mmol/


 Magnesium Sulfate


 8 meq/Calcium


 Gluconate 15 meq/


 Multivitamins 10


 ml/Chromium/


 Copper/Manganese/


 Seleni/Zn 0.5 ml/


 Insulin Human


 Regular 20 unit/


 Total Parenteral


 Nutrition/Amino


 Acids/Dextrose/


 Fat Emulsion


 Intravenous  1,400 ml @ 


 58.333 mls/


 hr  TPN  CONT  4/2/20 22:00


 4/3/20 21:59 DC 4/2/20 22:45


58.333 MLS/HR


 


 Sodium Chloride


 90 meq/Potassium


 Chloride 15 meq/


 Potassium


 Phosphate 18 mmol/


 Magnesium Sulfate


 8 meq/Calcium


 Gluconate 15 meq/


 Multivitamins 10


 ml/Chromium/


 Copper/Manganese/


 Seleni/Zn 0.5 ml/


 Total Parenteral


 Nutrition/Amino


 Acids/Dextrose/


 Fat Emulsion


 Intravenous  1,400 ml @ 


 58.333 mls/


 hr  TPN  CONT  3/26/20 22:00


 3/27/20 21:59 DC 3/26/20 22:00


58.333 MLS/HR


 


 Sodium Chloride


 90 meq/Potassium


 Phosphate 15 mmol/


 Magnesium Sulfate


 12 meq/Calcium


 Gluconate 15 meq/


 Multivitamins 10


 ml/Chromium/


 Copper/Manganese/


 Seleni/Zn 0.5 ml/


 Insulin Human


 Regular 30 unit/


 Total Parenteral


 Nutrition/Amino


 Acids/Dextrose/


 Fat Emulsion


 Intravenous  1,400 ml @ 


 58.333 mls/


 hr  TPN  CONT  4/10/20 22:00


 4/11/20 21:59 DC 4/10/20 21:49


58.333 MLS/HR


 


 Sodium Chloride


 90 meq/Potassium


 Phosphate 15 mmol/


 Magnesium Sulfate


 12 meq/Calcium


 Gluconate 15 meq/


 Multivitamins 10


 ml/Chromium/


 Copper/Manganese/


 Seleni/Zn 0.5 ml/


 Insulin Human


 Regular 40 unit/


 Total Parenteral


 Nutrition/Amino


 Acids/Dextrose/


 Fat Emulsion


 Intravenous  1,400 ml @ 


 58.333 mls/


 hr  TPN  CONT  4/11/20 22:00


 4/12/20 21:59 DC 4/11/20 21:21


58.333 MLS/HR


 


 Sodium Chloride


 90 meq/Potassium


 Phosphate 19 mmol/


 Magnesium Sulfate


 12 meq/Calcium


 Gluconate 15 meq/


 Multivitamins 10


 ml/Chromium/


 Copper/Manganese/


 Seleni/Zn 0.5 ml/


 Insulin Human


 Regular 40 unit/


 Total Parenteral


 Nutrition/Amino


 Acids/Dextrose/


 Fat Emulsion


 Intravenous  1,400 ml @ 


 58.333 mls/


 hr  TPN  CONT  4/12/20 22:00


 4/13/20 21:59 DC 4/12/20 21:54


58.333 MLS/HR


 


 Sodium Chloride


 90 meq/Potassium


 Phosphate 5 mmol/


 Magnesium Sulfate


 12 meq/Calcium


 Gluconate 15 meq/


 Multivitamins 10


 ml/Chromium/


 Copper/Manganese/


 Seleni/Zn 0.5 ml/


 Insulin Human


 Regular 30 unit/


 Total Parenteral


 Nutrition/Amino


 Acids/Dextrose/


 Fat Emulsion


 Intravenous  1,400 ml @ 


 58.333 mls/


 hr  TPN  CONT  4/9/20 22:00


 4/10/20 21:59 DC 4/9/20 22:08


58.333 MLS/HR


 


 Sodium Chloride


 100 meq/Potassium


 Chloride 40 meq/


 Magnesium Sulfate


 15 meq/Calcium


 Gluconate 15 meq/


 Multivitamins 10


 ml/Chromium/


 Copper/Manganese/


 Seleni/Zn 0.5 ml/


 Insulin Human


 Regular 35 unit/


 Total Parenteral


 Nutrition/Amino


 Acids/Dextrose/


 Fat Emulsion


 Intravenous  1,400 ml @ 


 58.333 mls/


 hr  TPN  CONT  4/19/20 22:00


 4/20/20 21:59 DC 4/19/20 22:46


58.333 MLS/HR


 


 Sodium Chloride


 100 meq/Potassium


 Chloride 40 meq/


 Magnesium Sulfate


 20 meq/Calcium


 Gluconate 10 meq/


 Multivitamins 10


 ml/Chromium/


 Copper/Manganese/


 Seleni/Zn 0.5 ml/


 Insulin Human


 Regular 35 unit/


 Total Parenteral


 Nutrition/Amino


 Acids/Dextrose/


 Fat Emulsion


 Intravenous  1,400 ml @ 


 58.333 mls/


 hr  TPN  CONT  4/23/20 22:00


 4/24/20 21:59 DC 4/24/20 00:06


58.333 MLS/HR


 


 Sodium Chloride


 100 meq/Potassium


 Chloride 40 meq/


 Magnesium Sulfate


 20 meq/Calcium


 Gluconate 15 meq/


 Multivitamins 10


 ml/Chromium/


 Copper/Manganese/


 Seleni/Zn 0.5 ml/


 Insulin Human


 Regular 35 unit/


 Total Parenteral


 Nutrition/Amino


 Acids/Dextrose/


 Fat Emulsion


 Intravenous  1,400 ml @ 


 58.333 mls/


 hr  TPN  CONT  4/22/20 22:00


 4/23/20 21:59 DC 4/22/20 22:27


58.333 MLS/HR


 


 Sodium Chloride


 100 meq/Potassium


 Phosphate 10 mmol/


 Magnesium Sulfate


 12 meq/Calcium


 Gluconate 15 meq/


 Multivitamins 10


 ml/Chromium/


 Copper/Manganese/


 Seleni/Zn 0.5 ml/


 Insulin Human


 Regular 35 unit/


 Potassium


 Chloride 20 meq/


 Total Parenteral


 Nutrition/Amino


 Acids/Dextrose/


 Fat Emulsion


 Intravenous  1,400 ml @ 


 58.333 mls/


 hr  TPN  CONT  4/16/20 22:00


 4/17/20 21:59 DC 4/16/20 22:10


58.333 MLS/HR


 


 Sodium Chloride


 100 meq/Potassium


 Phosphate 19 mmol/


 Magnesium Sulfate


 12 meq/Calcium


 Gluconate 15 meq/


 Multivitamins 10


 ml/Chromium/


 Copper/Manganese/


 Seleni/Zn 0.5 ml/


 Insulin Human


 Regular 40 unit/


 Potassium


 Chloride 20 meq/


 Total Parenteral


 Nutrition/Amino


 Acids/Dextrose/


 Fat Emulsion


 Intravenous  1,400 ml @ 


 58.333 mls/


 hr  TPN  CONT  4/15/20 22:00


 4/16/20 21:59 DC 4/15/20 21:20


58.333 MLS/HR


 


 Sodium Chloride


 100 meq/Potassium


 Phosphate 5 mmol/


 Magnesium Sulfate


 12 meq/Calcium


 Gluconate 15 meq/


 Multivitamins 10


 ml/Chromium/


 Copper/Manganese/


 Seleni/Zn 0.5 ml/


 Insulin Human


 Regular 35 unit/


 Potassium


 Chloride 20 meq/


 Total Parenteral


 Nutrition/Amino


 Acids/Dextrose/


 Fat Emulsion


 Intravenous  1,400 ml @ 


 58.333 mls/


 hr  TPN  CONT  4/17/20 22:00


 4/18/20 21:59 DC 4/17/20 22:59


58.333 MLS/HR


 


 Succinylcholine


 Chloride


  (Anectine)  120 mg  1X  ONCE  3/23/20 08:30


 3/23/20 08:31 DC 3/23/20 08:34


120 MG


 


 Vecuronium Bromide


  (Norcuron Bolus)  6 mg  PRN Q6HRS  PRN  5/7/20 19:15


 5/7/20 19:35 DC  











Lab





Laboratory Tests








Test


 5/10/20


12:40 5/10/20


15:20 5/10/20


17:44 5/11/20


00:13


 


Glucose (Fingerstick)


 149 mg/dL


(70-99) 


 142 mg/dL


(70-99) 162 mg/dL


(70-99)


 


Sodium Level


 


 146 mmol/L


(136-145) 


 





 


Potassium Level


 


 3.4 mmol/L


(3.5-5.1) 


 





 


Chloride Level


 


 106 mmol/L


() 


 





 


Carbon Dioxide Level


 


 33 mmol/L


(21-32) 


 





 


Anion Gap  7 (6-14)   


 


Blood Urea Nitrogen


 


 28 mg/dL


(7-20) 


 





 


Creatinine


 


 0.9 mg/dL


(0.6-1.0) 


 





 


Estimated GFR


(Cockcroft-Gault) 


 66.5 


 


 





 


BUN/Creatinine Ratio  31 (6-20)   


 


Glucose Level


 


 166 mg/dL


(70-99) 


 





 


Calcium Level


 


 8.2 mg/dL


(8.5-10.1) 


 





 


Total Bilirubin


 


 0.4 mg/dL


(0.2-1.0) 


 





 


Aspartate Amino Transf


(AST/SGOT) 


 38 U/L (15-37) 


 


 





 


Alanine Aminotransferase


(ALT/SGPT) 


 39 U/L (14-59) 


 


 





 


Alkaline Phosphatase


 


 95 U/L


() 


 





 


Total Protein


 


 5.5 g/dL


(6.4-8.2) 


 





 


Albumin


 


 1.6 g/dL


(3.4-5.0) 


 





 


Albumin/Globulin Ratio  0.4 (1.0-1.7)   


 


Test


 5/11/20


05:00 5/11/20


05:06 


 





 


Sodium Level


 145 mmol/L


(136-145) 


 


 





 


Potassium Level


 3.8 mmol/L


(3.5-5.1) 


 


 





 


Chloride Level


 106 mmol/L


() 


 


 





 


Carbon Dioxide Level


 33 mmol/L


(21-32) 


 


 





 


Anion Gap 6 (6-14)    


 


Blood Urea Nitrogen


 29 mg/dL


(7-20) 


 


 





 


Creatinine


 0.7 mg/dL


(0.6-1.0) 


 


 





 


Estimated GFR


(Cockcroft-Gault) 88.9 


 


 


 





 


Glucose Level


 125 mg/dL


(70-99) 


 


 





 


Calcium Level


 8.4 mg/dL


(8.5-10.1) 


 


 





 


Phosphorus Level


 3.8 mg/dL


(2.6-4.7) 


 


 





 


Magnesium Level


 2.0 mg/dL


(1.8-2.4) 


 


 





 


Glucose (Fingerstick)


 


 126 mg/dL


(70-99) 


 











Results


All relevant outside records, renal labs, imaging studies, telemetry/EKG's were 

reviewed.











KIMBERLY MYERS MD                May 11, 2020 11:05

## 2020-05-12 VITALS — DIASTOLIC BLOOD PRESSURE: 84 MMHG | SYSTOLIC BLOOD PRESSURE: 154 MMHG

## 2020-05-12 VITALS — DIASTOLIC BLOOD PRESSURE: 78 MMHG | SYSTOLIC BLOOD PRESSURE: 140 MMHG

## 2020-05-12 VITALS — DIASTOLIC BLOOD PRESSURE: 66 MMHG | SYSTOLIC BLOOD PRESSURE: 105 MMHG

## 2020-05-12 VITALS — SYSTOLIC BLOOD PRESSURE: 146 MMHG | DIASTOLIC BLOOD PRESSURE: 87 MMHG

## 2020-05-12 VITALS — SYSTOLIC BLOOD PRESSURE: 126 MMHG | DIASTOLIC BLOOD PRESSURE: 69 MMHG

## 2020-05-12 VITALS — SYSTOLIC BLOOD PRESSURE: 135 MMHG | DIASTOLIC BLOOD PRESSURE: 88 MMHG

## 2020-05-12 VITALS — SYSTOLIC BLOOD PRESSURE: 133 MMHG | DIASTOLIC BLOOD PRESSURE: 74 MMHG

## 2020-05-12 VITALS — DIASTOLIC BLOOD PRESSURE: 95 MMHG | SYSTOLIC BLOOD PRESSURE: 177 MMHG

## 2020-05-12 VITALS — DIASTOLIC BLOOD PRESSURE: 75 MMHG | SYSTOLIC BLOOD PRESSURE: 145 MMHG

## 2020-05-12 VITALS — SYSTOLIC BLOOD PRESSURE: 136 MMHG | DIASTOLIC BLOOD PRESSURE: 74 MMHG

## 2020-05-12 VITALS — DIASTOLIC BLOOD PRESSURE: 62 MMHG | SYSTOLIC BLOOD PRESSURE: 116 MMHG

## 2020-05-12 VITALS — DIASTOLIC BLOOD PRESSURE: 76 MMHG | SYSTOLIC BLOOD PRESSURE: 119 MMHG

## 2020-05-12 VITALS — DIASTOLIC BLOOD PRESSURE: 88 MMHG | SYSTOLIC BLOOD PRESSURE: 153 MMHG

## 2020-05-12 VITALS — DIASTOLIC BLOOD PRESSURE: 76 MMHG | SYSTOLIC BLOOD PRESSURE: 127 MMHG

## 2020-05-12 VITALS — SYSTOLIC BLOOD PRESSURE: 121 MMHG | DIASTOLIC BLOOD PRESSURE: 72 MMHG

## 2020-05-12 VITALS — SYSTOLIC BLOOD PRESSURE: 167 MMHG | DIASTOLIC BLOOD PRESSURE: 93 MMHG

## 2020-05-12 VITALS — DIASTOLIC BLOOD PRESSURE: 86 MMHG | SYSTOLIC BLOOD PRESSURE: 157 MMHG

## 2020-05-12 VITALS — SYSTOLIC BLOOD PRESSURE: 139 MMHG | DIASTOLIC BLOOD PRESSURE: 81 MMHG

## 2020-05-12 VITALS — SYSTOLIC BLOOD PRESSURE: 127 MMHG | DIASTOLIC BLOOD PRESSURE: 87 MMHG

## 2020-05-12 VITALS — SYSTOLIC BLOOD PRESSURE: 149 MMHG | DIASTOLIC BLOOD PRESSURE: 86 MMHG

## 2020-05-12 VITALS — SYSTOLIC BLOOD PRESSURE: 149 MMHG | DIASTOLIC BLOOD PRESSURE: 81 MMHG

## 2020-05-12 VITALS — DIASTOLIC BLOOD PRESSURE: 63 MMHG | SYSTOLIC BLOOD PRESSURE: 127 MMHG

## 2020-05-12 VITALS — DIASTOLIC BLOOD PRESSURE: 68 MMHG | SYSTOLIC BLOOD PRESSURE: 133 MMHG

## 2020-05-12 LAB
ALBUMIN SERPL-MCNC: 1.8 G/DL (ref 3.4–5)
ALBUMIN/GLOB SERPL: 0.4 {RATIO} (ref 1–1.7)
ALP SERPL-CCNC: 119 U/L (ref 46–116)
ALT SERPL-CCNC: 50 U/L (ref 14–59)
ANION GAP SERPL CALC-SCNC: 4 MMOL/L (ref 6–14)
AST SERPL-CCNC: 50 U/L (ref 15–37)
BASOPHILS # BLD AUTO: 0.1 X10^3/UL (ref 0–0.2)
BASOPHILS NFR BLD: 0 % (ref 0–3)
BILIRUB SERPL-MCNC: 0.4 MG/DL (ref 0.2–1)
BUN SERPL-MCNC: 24 MG/DL (ref 7–20)
BUN/CREAT SERPL: 34 (ref 6–20)
CALCIUM SERPL-MCNC: 8.6 MG/DL (ref 8.5–10.1)
CHLORIDE SERPL-SCNC: 105 MMOL/L (ref 98–107)
CO2 SERPL-SCNC: 36 MMOL/L (ref 21–32)
CREAT SERPL-MCNC: 0.7 MG/DL (ref 0.6–1)
EOSINOPHIL NFR BLD: 0.2 X10^3/UL (ref 0–0.7)
EOSINOPHIL NFR BLD: 1 % (ref 0–3)
ERYTHROCYTE [DISTWIDTH] IN BLOOD BY AUTOMATED COUNT: 17.7 % (ref 11.5–14.5)
GFR SERPLBLD BASED ON 1.73 SQ M-ARVRAT: 88.9 ML/MIN
GLOBULIN SER-MCNC: 4.6 G/DL (ref 2.2–3.8)
GLUCOSE SERPL-MCNC: 154 MG/DL (ref 70–99)
HCT VFR BLD CALC: 26.6 % (ref 36–47)
HGB BLD-MCNC: 8.5 G/DL (ref 12–15.5)
LYMPHOCYTES # BLD: 3.7 X10^3/UL (ref 1–4.8)
LYMPHOCYTES NFR BLD AUTO: 25 % (ref 24–48)
MCH RBC QN AUTO: 28 PG (ref 25–35)
MCHC RBC AUTO-ENTMCNC: 32 G/DL (ref 31–37)
MCV RBC AUTO: 89 FL (ref 79–100)
MONO #: 0.7 X10^3/UL (ref 0–1.1)
MONOCYTES NFR BLD: 4 % (ref 0–9)
NEUT #: 10.5 X10^3/UL (ref 1.8–7.7)
NEUTROPHILS NFR BLD AUTO: 70 % (ref 31–73)
PLATELET # BLD AUTO: 522 X10^3/UL (ref 140–400)
POTASSIUM SERPL-SCNC: 3.6 MMOL/L (ref 3.5–5.1)
PROT SERPL-MCNC: 6.4 G/DL (ref 6.4–8.2)
RBC # BLD AUTO: 2.98 X10^6/UL (ref 3.5–5.4)
SODIUM SERPL-SCNC: 145 MMOL/L (ref 136–145)
WBC # BLD AUTO: 15.1 X10^3/UL (ref 4–11)

## 2020-05-12 RX ADMIN — Medication PRN EACH: at 11:34

## 2020-05-12 RX ADMIN — METOCLOPRAMIDE PRN MG: 5 INJECTION, SOLUTION INTRAMUSCULAR; INTRAVENOUS at 06:34

## 2020-05-12 RX ADMIN — DEXMEDETOMIDINE HYDROCHLORIDE PRN MLS/HR: 100 INJECTION, SOLUTION, CONCENTRATE INTRAVENOUS at 11:25

## 2020-05-12 RX ADMIN — DEXMEDETOMIDINE HYDROCHLORIDE PRN MLS/HR: 100 INJECTION, SOLUTION, CONCENTRATE INTRAVENOUS at 00:17

## 2020-05-12 RX ADMIN — INSULIN LISPRO SCH UNITS: 100 INJECTION, SOLUTION INTRAVENOUS; SUBCUTANEOUS at 00:00

## 2020-05-12 RX ADMIN — INSULIN LISPRO SCH UNITS: 100 INJECTION, SOLUTION INTRAVENOUS; SUBCUTANEOUS at 18:00

## 2020-05-12 RX ADMIN — HYDROMORPHONE HYDROCHLORIDE PRN MG: 2 INJECTION INTRAMUSCULAR; INTRAVENOUS; SUBCUTANEOUS at 00:53

## 2020-05-12 RX ADMIN — BACITRACIN SCH MLS/HR: 5000 INJECTION, POWDER, FOR SOLUTION INTRAMUSCULAR at 12:11

## 2020-05-12 RX ADMIN — HYDROMORPHONE HYDROCHLORIDE PRN MG: 2 INJECTION INTRAMUSCULAR; INTRAVENOUS; SUBCUTANEOUS at 06:35

## 2020-05-12 RX ADMIN — PANTOPRAZOLE SODIUM SCH MG: 40 INJECTION, POWDER, FOR SOLUTION INTRAVENOUS at 07:37

## 2020-05-12 RX ADMIN — MEROPENEM SCH MLS/HR: 1 INJECTION, POWDER, FOR SOLUTION INTRAVENOUS at 06:34

## 2020-05-12 RX ADMIN — INSULIN LISPRO SCH UNITS: 100 INJECTION, SOLUTION INTRAVENOUS; SUBCUTANEOUS at 11:35

## 2020-05-12 RX ADMIN — DEXMEDETOMIDINE HYDROCHLORIDE PRN MLS/HR: 100 INJECTION, SOLUTION, CONCENTRATE INTRAVENOUS at 21:47

## 2020-05-12 RX ADMIN — DEXMEDETOMIDINE HYDROCHLORIDE PRN MLS/HR: 100 INJECTION, SOLUTION, CONCENTRATE INTRAVENOUS at 16:31

## 2020-05-12 RX ADMIN — MEROPENEM SCH MLS/HR: 1 INJECTION, POWDER, FOR SOLUTION INTRAVENOUS at 14:34

## 2020-05-12 RX ADMIN — DEXMEDETOMIDINE HYDROCHLORIDE PRN MLS/HR: 100 INJECTION, SOLUTION, CONCENTRATE INTRAVENOUS at 04:53

## 2020-05-12 RX ADMIN — BUMETANIDE SCH MG: 0.25 INJECTION INTRAMUSCULAR; INTRAVENOUS at 07:38

## 2020-05-12 RX ADMIN — MEROPENEM SCH MLS/HR: 1 INJECTION, POWDER, FOR SOLUTION INTRAVENOUS at 21:38

## 2020-05-12 RX ADMIN — METOCLOPRAMIDE PRN MG: 5 INJECTION, SOLUTION INTRAMUSCULAR; INTRAVENOUS at 00:16

## 2020-05-12 RX ADMIN — DEXTROSE SCH MLS/HR: 50 INJECTION, SOLUTION INTRAVENOUS at 11:26

## 2020-05-12 RX ADMIN — HYDROMORPHONE HYDROCHLORIDE PRN MG: 2 INJECTION INTRAMUSCULAR; INTRAVENOUS; SUBCUTANEOUS at 14:08

## 2020-05-12 RX ADMIN — INSULIN LISPRO SCH UNITS: 100 INJECTION, SOLUTION INTRAVENOUS; SUBCUTANEOUS at 06:00

## 2020-05-12 NOTE — PDOC
Subjective:


Subjective:


Feels the same.





Objective:


Objective:


Stable per nurse - has been on bed pan a couple times, no stool.


Vital Signs:





                                   Vital Signs








  Date Time  Temp Pulse Resp B/P (MAP) Pulse Ox O2 Delivery O2 Flow Rate FiO2


 


5/12/20 10:05  111 19 154/84 (107) 95 Tracheal Collar 10.0 


 


5/12/20 08:00 98.7       





 98.7       








Labs:





Laboratory Tests








Test


 5/11/20


12:05 5/11/20


19:38 5/12/20


00:57 5/12/20


06:43


 


Glucose (Fingerstick) 181 mg/dL  156 mg/dL  119 mg/dL  139 mg/dL 


 


Test


 5/12/20


08:50 


 


 





 


Sodium Level 145 mmol/L    


 


Potassium Level 3.6 mmol/L    


 


Chloride Level 105 mmol/L    


 


Carbon Dioxide Level 36 mmol/L    


 


Anion Gap 4    


 


Blood Urea Nitrogen 24 mg/dL    


 


Creatinine 0.7 mg/dL    


 


Estimated GFR


(Cockcroft-Gault) 88.9 


 


 


 





 


BUN/Creatinine Ratio 34    


 


Glucose Level 154 mg/dL    


 


Calcium Level 8.6 mg/dL    


 


Total Bilirubin 0.4 mg/dL    


 


Aspartate Amino Transf


(AST/SGOT) 50 U/L 


 


 


 





 


Alanine Aminotransferase


(ALT/SGPT) 50 U/L 


 


 


 





 


Alkaline Phosphatase 119 U/L    


 


Total Protein 6.4 g/dL    


 


Albumin 1.8 g/dL    


 


Albumin/Globulin Ratio 0.4    








ORDERED:  ANAER/AEROB/GS                                                        

 


COMMENTS: ABDOMINAL FLUID                                             


 

--------------------------------------------------------------------------------


------------





  Procedure                         Result                                      

         


-----------------------------------------------

---------------------------------------------





  ANAEROBIC-AEROBIC CULTURE  Preliminary  


        Preliminary report





  ANAEROBIC RES 1  Preliminary  


        Comment





        No anaerobes recovered in 48 hours.





  AEROBIC CULT  Final  


        Final report





  AEROBIC RES 1  Final  


        Yeast isolated.





        4+


        Request for further identification must be made


        within 1 week.





  GRAM STAIN  Final  


        Final report





  GRAM STAIN RES 1  Final  


        Comment





        No white blood cells seen.





  GRAM STAIN RES 2  Final  


        No organisms seen





ORDERED:  JESSICA CULT,OTHER                                                       

 


COMMENTS: ABDOMINAL FLUID                                             


----------------------------------

----------------------------------------------------------





  Procedure                         Result                                      

         


 

--------------------------------------------------------------------------------


------------





  FUNGAL CULTURE,OTHER  Final  


        Final report





  JESSICA CULT RES 1  Final  


        Candida parapsilosis


Imaging:


CXR 5/12


Impression: 


1.  Moderate bilateral layering pleural effusions, unchanged.


2.  Patchy central and bibasilar opacities, unchanged.





PE:





GEN: NAD - RT present


LUNGS: trach/breathing treatment


HEART: RRR


ABD: distended but softer, NG bilious, drains


NEURO/PSYCH: A & O 3





A/P:


Gallstone pancreatitis, MOSF





--


Continue same.





Hemodynamically unstable?:  No


Is patient in severe pain?:  Yes


Is NPO status required?:  Yes











RAGHAVENDRA MURCIA         May 12, 2020 11:14

## 2020-05-12 NOTE — NUR
SS following up with discharge planning. SS discussed with RN, Kassie and Johnson. Pt is 
currently on the trach collar at 10 liters. Pt had thoracentesis today. Pt continues to have 
drains and IV antibiotics. Pt NPO and on TPN. Pt not tolerating speaking valve. HCFS working 
with daughters to complete disability application. SS will continue to follow for discharge 
planning.

## 2020-05-12 NOTE — PDOC
PULMONARY PROGRESS NOTES


Subjective


Patient intubated on 3/23 , s/p trach 4/6, awake alert follows commands


placed on TS this am


Nursing reports ongoing  N/V


Vitals





Vital Signs








  Date Time  Temp Pulse Resp B/P (MAP) Pulse Ox O2 Delivery O2 Flow Rate FiO2


 


5/12/20 09:06  118 21 157/86 (109) 94 Tracheal Collar 10.0 


 


5/12/20 08:00 98.7       





 98.7       








ROS:  No Chest Pain, No Abdominal Pain, No Increase Cough


General:  Alert, No acute distress


Lungs:  Crackles


Cardiovascular:  S1, S2


Abdomen:  Soft, Non-tender, Other (firm)


Neuro Exam:  Alert


Extremities:  Other (+3 generalized edema )


Skin:  Warm, Dry


Labs





Laboratory Tests








Test


 5/10/20


12:40 5/10/20


15:20 5/10/20


17:44 5/11/20


00:13


 


Glucose (Fingerstick)


 149 mg/dL


(70-99) 


 142 mg/dL


(70-99) 162 mg/dL


(70-99)


 


Sodium Level


 


 146 mmol/L


(136-145) 


 





 


Potassium Level


 


 3.4 mmol/L


(3.5-5.1) 


 





 


Chloride Level


 


 106 mmol/L


() 


 





 


Carbon Dioxide Level


 


 33 mmol/L


(21-32) 


 





 


Anion Gap  7 (6-14)   


 


Blood Urea Nitrogen


 


 28 mg/dL


(7-20) 


 





 


Creatinine


 


 0.9 mg/dL


(0.6-1.0) 


 





 


Estimated GFR


(Cockcroft-Gault) 


 66.5 


 


 





 


BUN/Creatinine Ratio  31 (6-20)   


 


Glucose Level


 


 166 mg/dL


(70-99) 


 





 


Calcium Level


 


 8.2 mg/dL


(8.5-10.1) 


 





 


Total Bilirubin


 


 0.4 mg/dL


(0.2-1.0) 


 





 


Aspartate Amino Transf


(AST/SGOT) 


 38 U/L (15-37) 


 


 





 


Alanine Aminotransferase


(ALT/SGPT) 


 39 U/L (14-59) 


 


 





 


Alkaline Phosphatase


 


 95 U/L


() 


 





 


Total Protein


 


 5.5 g/dL


(6.4-8.2) 


 





 


Albumin


 


 1.6 g/dL


(3.4-5.0) 


 





 


Albumin/Globulin Ratio  0.4 (1.0-1.7)   


 


Test


 5/11/20


05:00 5/11/20


05:06 5/11/20


12:05 5/11/20


19:38


 


Sodium Level


 145 mmol/L


(136-145) 


 


 





 


Potassium Level


 3.8 mmol/L


(3.5-5.1) 


 


 





 


Chloride Level


 106 mmol/L


() 


 


 





 


Carbon Dioxide Level


 33 mmol/L


(21-32) 


 


 





 


Anion Gap 6 (6-14)    


 


Blood Urea Nitrogen


 29 mg/dL


(7-20) 


 


 





 


Creatinine


 0.7 mg/dL


(0.6-1.0) 


 


 





 


Estimated GFR


(Cockcroft-Gault) 88.9 


 


 


 





 


Glucose Level


 125 mg/dL


(70-99) 


 


 





 


Calcium Level


 8.4 mg/dL


(8.5-10.1) 


 


 





 


Phosphorus Level


 3.8 mg/dL


(2.6-4.7) 


 


 





 


Magnesium Level


 2.0 mg/dL


(1.8-2.4) 


 


 





 


Glucose (Fingerstick)


 


 126 mg/dL


(70-99) 181 mg/dL


(70-99) 156 mg/dL


(70-99)


 


Test


 5/12/20


00:57 5/12/20


06:43 5/12/20


08:50 





 


Glucose (Fingerstick)


 119 mg/dL


(70-99) 139 mg/dL


(70-99) 


 





 


Sodium Level


 


 


 145 mmol/L


(136-145) 





 


Potassium Level


 


 


 3.6 mmol/L


(3.5-5.1) 





 


Chloride Level


 


 


 105 mmol/L


() 





 


Carbon Dioxide Level


 


 


 36 mmol/L


(21-32) 





 


Anion Gap   4 (6-14)  


 


Blood Urea Nitrogen


 


 


 24 mg/dL


(7-20) 





 


Creatinine


 


 


 0.7 mg/dL


(0.6-1.0) 





 


Estimated GFR


(Cockcroft-Gault) 


 


 88.9 


 





 


BUN/Creatinine Ratio   34 (6-20)  


 


Glucose Level


 


 


 154 mg/dL


(70-99) 





 


Calcium Level


 


 


 8.6 mg/dL


(8.5-10.1) 





 


Total Bilirubin


 


 


 0.4 mg/dL


(0.2-1.0) 





 


Aspartate Amino Transf


(AST/SGOT) 


 


 50 U/L (15-37) 


 





 


Alanine Aminotransferase


(ALT/SGPT) 


 


 50 U/L (14-59) 


 





 


Alkaline Phosphatase


 


 


 119 U/L


() 





 


Total Protein


 


 


 6.4 g/dL


(6.4-8.2) 





 


Albumin


 


 


 1.8 g/dL


(3.4-5.0) 





 


Albumin/Globulin Ratio   0.4 (1.0-1.7)  








Laboratory Tests








Test


 5/11/20


12:05 5/11/20


19:38 5/12/20


00:57 5/12/20


06:43


 


Glucose (Fingerstick)


 181 mg/dL


(70-99) 156 mg/dL


(70-99) 119 mg/dL


(70-99) 139 mg/dL


(70-99)


 


Test


 5/12/20


08:50 


 


 





 


Sodium Level


 145 mmol/L


(136-145) 


 


 





 


Potassium Level


 3.6 mmol/L


(3.5-5.1) 


 


 





 


Chloride Level


 105 mmol/L


() 


 


 





 


Carbon Dioxide Level


 36 mmol/L


(21-32) 


 


 





 


Anion Gap 4 (6-14)    


 


Blood Urea Nitrogen


 24 mg/dL


(7-20) 


 


 





 


Creatinine


 0.7 mg/dL


(0.6-1.0) 


 


 





 


Estimated GFR


(Cockcroft-Gault) 88.9 


 


 


 





 


BUN/Creatinine Ratio 34 (6-20)    


 


Glucose Level


 154 mg/dL


(70-99) 


 


 





 


Calcium Level


 8.6 mg/dL


(8.5-10.1) 


 


 





 


Total Bilirubin


 0.4 mg/dL


(0.2-1.0) 


 


 





 


Aspartate Amino Transf


(AST/SGOT) 50 U/L (15-37) 


 


 


 





 


Alanine Aminotransferase


(ALT/SGPT) 50 U/L (14-59) 


 


 


 





 


Alkaline Phosphatase


 119 U/L


() 


 


 





 


Total Protein


 6.4 g/dL


(6.4-8.2) 


 


 





 


Albumin


 1.8 g/dL


(3.4-5.0) 


 


 





 


Albumin/Globulin Ratio 0.4 (1.0-1.7)    








Medications





Active Scripts








 Medications  Dose


 Route/Sig


 Max Daily Dose Days Date Category


 


 Bisoprolol


 Fumarate 5 Mg


 Tablet  10 Mg


 PO DAILY


   3/16/20 Reported








Comments


CXR  5/12/2020





persistent moderate effusions





Impression


.


IMPRESSION:


1.  Acute hypoxemic respiratory failure secondary to ARDS status post trach,


2.  Gallstone pancreatitis


3.  Severe metabolic acidosis.stable


4.  Acute kidney injury-stable, ON HD-- continue to improve 


5.  Acute gallstone pancreatitis.


6.  Hypoalbuminemia.


7.  Moderate persistent effusions


8.  Fever-  Per ID, per surgery--resolved 


9.  Chronic anemia


10. Covid 19 testing negative


11. Moderate to large ascites-S/P paracentisis


12.S/P paracentisis with 4 liters removed on 4/15/20


13. S/P IR drain placement on 5/8/2020





Plan


.


d/w IR , will proceed with thoracentesis


TS all day and PRN PS qhs


Follow surgery recs-- S/P 3 drain placed in IR on 5/8/2020


Follow ID recs for ABX


Follow nephrology recs 


Continue TPN 


DVT/GI PPX: heparin SQ/ protonix 


D/W RN and RT





CODE:FULL











SHARYN SOLORZANO MD                 May 12, 2020 09:40

## 2020-05-12 NOTE — PDOC
[facility] PROGRESS NOTE


Chief Complaint


Chief Complaint


Acute hypoxic Respiratory failure requiring mechanical ventilation (on vent 

since 3/23)


Tracheostomy


bilateral pleural effusions/pulm edema 


Sepsis


Severe Acute gallstone pancreatitis (not a surgical candidate at this time) with

necrosis


Acute kidney failure now requiring dialysis


Salpingitis


Gallstones (Calculus of gallbladder with acute cholecystitis without 

obstruction)


HTN 


Leukocytosis 


Hypoxia


Uterine fibroid


Intractable pain


Intractable nausea


Covid 19 negative. 


Acute on chronic anemia 


EEG: No seizure activityFever  - better currently - intermittent could be from 

underlying pancreatitis blood cults 5/4 - neg so far


? Ileus with vomiting


Abd distention - U/S and CT reviewed s/p 0.4 L of opaque, debris-containing 

ascites was removed 5/6


Acute pancreatitis with persistent necrosis


- 4/27 status post ROBERT drain placement + C paropsilosis. s/p additional drains 

5/8


Anemia - S/p PRBCs


Cholelithiasis with thickening of the gallbladder wall.


Leucocytosis improving


JED, hyperkalemia, Metabolic acidosis off dialysis


Acute hypoxic resp failure ,bilateral pleural effusion and atelectasis


hypocalcemia 


Prediabetes


HTN


s/p trach


ESRD on HD


Hyperglycemia





History of Present Illness


History of Present Illness


5/12/2020


Patient seen and examined in the ICU


She now has a trach shield


Pleasantly confused but sedated with Precedex


Chart reviewed


Discussed with RN


She has IV TPN


Still very critically ill





5/11/2020


Patient seen and examined


She remains on the vent but spontaneous respirations with 20 of pressure support

and 30% FiO2


He still has NG to suction


Appears extremely ill and weak and confused


Has IV TPN Precedex antibiotics


[place] is to bedside drainage


She has SCDs and ProCare boots


She is on IV meropenem and daptomycin and micafungin


Chart reviewed


Discussed with RN


Patient is still critically ill








Ms Tadeo is a 50yo F w/ PMHx HTN, prediabetes who presented to the emergency 

room with complaints of abdominal pain on 3/16/2020. Found with Lipase 83310, 

, , Bilirubin 1.4.


CT abdomen confirms pancreatic inflammation, peripancreatic fluid and 

inflammatory changes around the pancreas consistent with pancreatitis. 

Cholelithiasis and 1.4cm uterine fibroid as well as possible left salpingitis. 

Admitted for further care


GI, General surgery, ID, Pulm consulted.





3/17: PICC placed per IR. Renal US negative. Started on levophed. Repeat CT 

abdomen w/ necrosis; 3/18: Dialysis catheter per nephrology; 3/19: On BiPAP; 

3/20: BiPAP, dialysis; 3/21: Overnight Tmax 101.7 , still on BiPAP FiO2 40%, 

still on low dose Levophed gtt, TPN initiated. On dialysis


4/6: Tracheostomy; 4/12: S/p tracheostomy on vent spontaneous respirations with 

5 of pressure support 35% FiO2, rectal tube and a Chino, off pressors; 

4/14:Still on vent via trach. Removed PICC and CVC LIJ and replaced. CT 

chest/abd/pelvis with bilateral pleural effusion and ascites.


4/15: Renal function stable. Still on vent. More interactive today. Miming wish 

for food. Plan discussed for thoracentesis/paracentesis with daughters today. 

They were under impression patient was doing worse due to a miscommunication 

which has been clarified over the phone. 4.3L removed.


4/17: Febrile overnight 101.8F. More interactive, still on vent. Asking for ice 

by miming; 4/18: Afebrile overnight. TMax last 24 hours 100.6F. Hb 7.1. 

Interactive when awake. 4/22: Transfusion 1u PRBC (6U total since admit)


4/23-4/26: TPN and precedex, vent.


4/27: Tmax 101F overnight. Hb 8.2. HD cath out since 4/24. Alert. On vent SIMV 

35% FiO2. Surgery: ex-lap, no naif or pancreatic necrosectomy 2/2 profound 

inflammation.


4/28: Seen POD #1. Afebrile overnight. BUN 62. CBC WNL today.Remains on vent via

trach, TPN. Able to point today and indicate she wishes her daughters and Rolf 

to be involved in her care.


4/29: Hb 7.4, Na 151. Remains on vent via trach, TPN. off HD for now. Not 

tolerating trach shield well.


4/30: Negative US for UE DVT. More relaxed today. Has some increase in UOP. 

Tolerating vent well. She is requesting to eat and drink via writing. T max 100F

24 hours ago, but 101F at 1200. Right PICC placed. Speaking valve for half the 

day in [facility]


5/1: Na 153 today. Good UOP. Tmax 101F at 1200 on 4/30. She becomes a bit 

anxious and did not sleep much last night, wishes to try to sleep this morning


5/2: Able to speak well with speaking valve today. Na 153, K2.9, Mg 1.8, Cr 1. 

100.4F axillary temp overnight. Asking for food.


5/3: Afebrile overnight. ABG with pH 7.27/75/123. Hb 7.1. Na 153. PCA settings 

decreased


5/4: No acute events reported overnight, case discussed with nursing staff 

patient in no acute distress no complaints during my visit


5/5: Patient responding to verbal stimuli.  She is saturating well and not 

requiring ventilation support, no acute events reported overnight seems to be 

slowly improving


5/6: Patient requiring transfusion of packed red blood cells due to anemia, no 

evidence of acute bleeding, appreciate GI consultant recommendations


5/7: Patient required ventilatory support given that she desaturated, she is 

lying in bed in mild distress, case discussed with nursing staff, no evidence of

bleeding, h an h noted. 


5/8: Underwent IR procedure as follows


BRIEF OPERATIVE NOTE


Pre-Op Diagnosis


Pancreatitis with pseudocysts, suspected infection


Post-Op Diagnosis


same


Procedure Performed


CT abdominal Drains x 3


Surgeon


Hardy


Anesthesia Type:  Conscious Sedation


Findings


3 abdominal drains, 14F, with turbid pancreatic fluid and necrotic debris in 

each.


Complications


No immediate








5/9: Patient today somewhat restless and having bilious secretions from ET tube,

imaging studies ordered, discussed with consultant. Pretty poor prognosis, 

hopefully is not a fistula, poor surgical candidate. 





5/10: Imaging with no acute events, she seems more stable today compared to 

yesterday. Encouraged as much activity as possible patient at high risk for 

severe depression.





Vitals/I&O


Vitals/I&O:





                                   Vital Signs








  Date Time  Temp Pulse Resp B/P (MAP) Pulse Ox O2 Delivery O2 Flow Rate FiO2


 


5/12/20 12:04  115 23 153/88 (109) 98 Tracheal Collar 10.0 


 


5/12/20 11:00 98.3       





 98.3       














                                    I & O   


 


 5/11/20 5/11/20 5/12/20





 15:00 23:00 07:00


 


Intake Total 150 ml 1523 ml 


 


Output Total 2000 ml 1624 ml 720 ml


 


Balance -1850 ml -101 ml -720 ml











Physical Exam


Physical Exam:


GENERAL: Propped up in bed, appears weak, tired


HEENT:  oral cavity dry, NGT


NECK:  Trach/vent 


LUNGS: Improved aeration has humidified O2 via trach shield


HEART:  S1, S2, regular 


ABDOMEN: Distended, hypoactive BS, tender, + drains x 3


: Chino (4/14)


EXTREMITIES: Generalized edema, no cyanosis, SCDs bilaterally 


SKIN: Warm and dry.  No generalized rash.  


CNS: Nodded some to couple questions 


PICC(4/30) clean


General:  Other (Resting with no apparent distress)


Heart:  Regular rate, Normal S1, Normal S2, No murmurs, Gallops


Lungs:  Crackles


Abdomen:  Soft, Other (drains with brownish drainage c/w pancreatic necrosis)


Extremities:  No clubbing, No cyanosis, No edema, Normal pulses, No tender

ness/swelling


Skin:  Other (warm, dry)





Labs


Labs:





Laboratory Tests








Test


 5/11/20


19:38 5/12/20


00:57 5/12/20


06:43 5/12/20


08:50


 


Glucose (Fingerstick)


 156 mg/dL


(70-99) 119 mg/dL


(70-99) 139 mg/dL


(70-99) 





 


White Blood Count


 


 


 


 15.1 x10^3/uL


(4.0-11.0)


 


Red Blood Count


 


 


 


 2.98 x10^6/uL


(3.50-5.40)


 


Hemoglobin


 


 


 


 8.5 g/dL


(12.0-15.5)


 


Hematocrit


 


 


 


 26.6 %


(36.0-47.0)


 


Mean Corpuscular Volume    89 fL () 


 


Mean Corpuscular Hemoglobin    28 pg (25-35) 


 


Mean Corpuscular Hemoglobin


Concent 


 


 


 32 g/dL


(31-37)


 


Red Cell Distribution Width


 


 


 


 17.7 %


(11.5-14.5)


 


Platelet Count


 


 


 


 522 x10^3/uL


(140-400)


 


Neutrophils (%) (Auto)    70 % (31-73) 


 


Lymphocytes (%) (Auto)    25 % (24-48) 


 


Monocytes (%) (Auto)    4 % (0-9) 


 


Eosinophils (%) (Auto)    1 % (0-3) 


 


Basophils (%) (Auto)    0 % (0-3) 


 


Neutrophils # (Auto)


 


 


 


 10.5 x10^3/uL


(1.8-7.7)


 


Lymphocytes # (Auto)


 


 


 


 3.7 x10^3/uL


(1.0-4.8)


 


Monocytes # (Auto)


 


 


 


 0.7 x10^3/uL


(0.0-1.1)


 


Eosinophils # (Auto)


 


 


 


 0.2 x10^3/uL


(0.0-0.7)


 


Basophils # (Auto)


 


 


 


 0.1 x10^3/uL


(0.0-0.2)


 


Sodium Level


 


 


 


 145 mmol/L


(136-145)


 


Potassium Level


 


 


 


 3.6 mmol/L


(3.5-5.1)


 


Chloride Level


 


 


 


 105 mmol/L


()


 


Carbon Dioxide Level


 


 


 


 36 mmol/L


(21-32)


 


Anion Gap    4 (6-14) 


 


Blood Urea Nitrogen


 


 


 


 24 mg/dL


(7-20)


 


Creatinine


 


 


 


 0.7 mg/dL


(0.6-1.0)


 


Estimated GFR


(Cockcroft-Gault) 


 


 


 88.9 





 


BUN/Creatinine Ratio    34 (6-20) 


 


Glucose Level


 


 


 


 154 mg/dL


(70-99)


 


Calcium Level


 


 


 


 8.6 mg/dL


(8.5-10.1)


 


Total Bilirubin


 


 


 


 0.4 mg/dL


(0.2-1.0)


 


Aspartate Amino Transf


(AST/SGOT) 


 


 


 50 U/L (15-37) 





 


Alanine Aminotransferase


(ALT/SGPT) 


 


 


 50 U/L (14-59) 





 


Alkaline Phosphatase


 


 


 


 119 U/L


()


 


Total Protein


 


 


 


 6.4 g/dL


(6.4-8.2)


 


Albumin


 


 


 


 1.8 g/dL


(3.4-5.0)


 


Albumin/Globulin Ratio    0.4 (1.0-1.7) 


 


Test


 5/12/20


11:31 


 


 





 


Glucose (Fingerstick)


 179 mg/dL


(70-99) 


 


 














Review of Systems


Review of Systems:


Unable to obtain





Assessment and Plan


Assessmemt and Plan


Problems


Medical Problems:


(1) Acute pancreatitis


Status: Acute  





(2) Cholelithiasis


Status: Acute  





Acute hypoxic Respiratory failure requiring mechanical ventilation (on vent 

since 3/23)


Tracheostomy


bilateral pleural effusions/pulm edema 


Sepsis


Severe Acute gallstone pancreatitis (not a surgical candidate at this time) with

necrosis


Acute kidney failure now requiring dialysis


Salpingitis


Gallstones (Calculus of gallbladder with acute cholecystitis without 

obstruction)


HTN 


Leukocytosis 


Hypoxia


Uterine fibroid


Intractable pain


Intractable nausea


Covid 19 negative. 


Acute on chronic anemia 


EEG: No seizure activityFever  - better currently - intermittent could be from 

underlying pancreatitis blood cults 5/4 - neg so far


? Ileus with vomiting


Abd distention - U/S and CT reviewed s/p 0.4 L of opaque, debris-containing 

ascites was removed 5/6


Acute pancreatitis with persistent necrosis


- 4/27 status post ROBERT drain placement + C paropsilosis. s/p additional drains 

5/8


Anemia - S/p PRBCs


Cholelithiasis with thickening of the gallbladder wall.


Leucocytosis improving


JED, hyperkalemia, Metabolic acidosis off dialysis


Acute hypoxic resp failure ,bilateral pleural effusion and atelectasis


hypocalcemia 


Prediabetes


HTN


s/p trach


ESRD on HD


Hyperglycemia








Plan


ICU monitoring


Wound care


Humidified O2 via trach shield


NG suctioning


Continue IV antibiotics and micafungin


Sedation with Precedex


TPN protocol


Continue Chino to bedside drainage


Tracheostomy care


Hope to eventually move towards decannulation


Trend labs


Appreciate subspecialist input


She is critically ill


Total time 31-minute





Comment


Review of Relevant


I have reviewed the following items josy (where applicable) has been applied.


Medications:





Current Medications








 Medications


  (Trade)  Dose


 Ordered  Sig/Yee


 Route


 PRN Reason  Start Time


 Stop Time Status Last Admin


Dose Admin


 


 Potassium


 Chloride 80 meq/


 Magnesium Sulfate


 20 meq/


 Multivitamins 10


 ml/Chromium/


 Copper/Manganese/


 Seleni/Zn 0.5 ml/


 Insulin Human


 Regular 15 unit/


 Total Parenteral


 Nutrition/Amino


 Acids/Dextrose/


 Fat Emulsion


 Intravenous  1,920 ml @ 


 80 mls/hr  TPN  CONT


 IV


   5/11/20 22:00


 5/12/20 21:59  5/11/20 22:20














Hemodynamically unstable?:  No


Is patient in severe pain?:  Yes


Is NPO status required?:  Yes











HECTOR MASON III DO           May 12, 2020 13:04

## 2020-05-12 NOTE — PDOC
Infectious Disease Note


Subjective


Subjective


feeling better says, awake nods appropriately


Remains on vent/trach FiO2 30% PEEP 5


On TPN





ROS


ROS


no n/v/d/





Vital Sign


Vital Signs





Vital Signs








  Date Time  Temp Pulse Resp B/P (MAP) Pulse Ox O2 Delivery O2 Flow Rate FiO2


 


5/12/20 06:35   11  97 Ventilator  


 


5/12/20 06:00  86  133/74 (93)    


 


5/12/20 04:00 98.7       





 98.7       











Physical Exam


PHYSICAL EXAM


GENERAL: Propped up in bed, appears weak, tired


HEENT:  oral cavity dry, NGT


NECK:  Trach/vent 


LUNGS: Improved aeration


HEART:  S1, S2, regular 


ABDOMEN: Distended, hypoactive BS, tender, + drains x 3


: [place] (4/14)


EXTREMITIES: Generalized edema, no cyanosis, SCDs bilaterally 


SKIN: Warm and dry.  No generalized rash.  


CNS: Nodded some to couple questions 


PICC(4/30) clean





Labs


Lab





Laboratory Tests








Test


 5/11/20


12:05 5/11/20


19:38 5/12/20


00:57 5/12/20


06:43


 


Glucose (Fingerstick)


 181 mg/dL


(70-99) 156 mg/dL


(70-99) 119 mg/dL


(70-99) 139 mg/dL


(70-99)








Micro








Objective


Assessment


Fever  - better currently - 


Abd distention - U/S and CT reviewed s/p 0.4 L of opaque, debris-containing 

ascites was removed 5/6


Acute pancreatitis with persistent necrosis


  - 4/27 status post ROBERT drain placement + C paropsilosis. s/p additional drains 

5/8


Anemia - S/p PRBCs


Cholelithiasis with thickening of the gallbladder wall.


Leucocytosis improving


JED, hyperkalemia, Metabolic acidosis off dialysis


Acute hypoxic resp failure ,bilateral pleural effusion and atelectasis


hypocalcemia 


Prediabetes


HTN


s/p trach





Plan


Plan of Care


 Dapto 5/4, merrem 4/8 -try to wean soon ,, d/c dapto


Continue micafungin


f/u cultures 


Maintain aspiration precaution


Supportive care











LINN FRANZ MD               May 12, 2020 07:37

## 2020-05-12 NOTE — NUR
At 1700 trach capped by RT Nimo. Patient tolerating well on 3L. Patient is slightly 
anxious but she is saying she is excited about the progress. O2 saturation is between 
96-99%.

## 2020-05-12 NOTE — RAD
PORTABLE CHEST 1V

 

History: Pleural effusions

 

Comparison: May 9, 2020

 

Findings:

Moderate bilateral layering pleural effusions, unchanged. Patchy central 

and bibasilar opacities, unchanged. Unchanged heart size. Stable 

tracheostomy tube, enteric tube and right PICC. No pneumothorax.

 

Impression: 

1.  Moderate bilateral layering pleural effusions, unchanged.

2.  Patchy central and bibasilar opacities, unchanged.

 

Electronically signed by: Torrey Coyle DO (5/12/2020 7:11 AM) [id number]

## 2020-05-12 NOTE — PDOC
SURGICAL PROGRESS NOTE


Subjective


Pt awake on vent, some abd pain


Vital Signs





Vital Signs








  Date Time  Temp Pulse Resp B/P (MAP) Pulse Ox O2 Delivery O2 Flow Rate FiO2


 


5/12/20 09:06  118 21 157/86 (109) 94 Tracheal Collar 10.0 


 


5/12/20 08:00 98.7       





 98.7       








I&O











Intake and Output 


 


 5/12/20





 07:00


 


Intake Total 1673 ml


 


Output Total 4344 ml


 


Balance -2671 ml


 


 


 


IV Total 1673 ml


 


Output Urine Total 3975 ml


 


Drainage Total 369 ml








PATIENT HAS A VILLASENOR:  Yes (accurate i and os)


General:  Alert, Cooperative, mild distress


Abdomen:  Soft, Other (drains with brownish drainage c/w pancreatic necrosis)


Labs





Laboratory Tests








Test


 5/10/20


12:40 5/10/20


15:20 5/10/20


17:44 5/11/20


00:13


 


Glucose (Fingerstick)


 149 mg/dL


(70-99) 


 142 mg/dL


(70-99) 162 mg/dL


(70-99)


 


Sodium Level


 


 146 mmol/L


(136-145) 


 





 


Potassium Level


 


 3.4 mmol/L


(3.5-5.1) 


 





 


Chloride Level


 


 106 mmol/L


() 


 





 


Carbon Dioxide Level


 


 33 mmol/L


(21-32) 


 





 


Anion Gap  7 (6-14)   


 


Blood Urea Nitrogen


 


 28 mg/dL


(7-20) 


 





 


Creatinine


 


 0.9 mg/dL


(0.6-1.0) 


 





 


Estimated GFR


(Cockcroft-Gault) 


 66.5 


 


 





 


BUN/Creatinine Ratio  31 (6-20)   


 


Glucose Level


 


 166 mg/dL


(70-99) 


 





 


Calcium Level


 


 8.2 mg/dL


(8.5-10.1) 


 





 


Total Bilirubin


 


 0.4 mg/dL


(0.2-1.0) 


 





 


Aspartate Amino Transf


(AST/SGOT) 


 38 U/L (15-37) 


 


 





 


Alanine Aminotransferase


(ALT/SGPT) 


 39 U/L (14-59) 


 


 





 


Alkaline Phosphatase


 


 95 U/L


() 


 





 


Total Protein


 


 5.5 g/dL


(6.4-8.2) 


 





 


Albumin


 


 1.6 g/dL


(3.4-5.0) 


 





 


Albumin/Globulin Ratio  0.4 (1.0-1.7)   


 


Test


 5/11/20


05:00 5/11/20


05:06 5/11/20


12:05 5/11/20


19:38


 


Sodium Level


 145 mmol/L


(136-145) 


 


 





 


Potassium Level


 3.8 mmol/L


(3.5-5.1) 


 


 





 


Chloride Level


 106 mmol/L


() 


 


 





 


Carbon Dioxide Level


 33 mmol/L


(21-32) 


 


 





 


Anion Gap 6 (6-14)    


 


Blood Urea Nitrogen


 29 mg/dL


(7-20) 


 


 





 


Creatinine


 0.7 mg/dL


(0.6-1.0) 


 


 





 


Estimated GFR


(Cockcroft-Gault) 88.9 


 


 


 





 


Glucose Level


 125 mg/dL


(70-99) 


 


 





 


Calcium Level


 8.4 mg/dL


(8.5-10.1) 


 


 





 


Phosphorus Level


 3.8 mg/dL


(2.6-4.7) 


 


 





 


Magnesium Level


 2.0 mg/dL


(1.8-2.4) 


 


 





 


Glucose (Fingerstick)


 


 126 mg/dL


(70-99) 181 mg/dL


(70-99) 156 mg/dL


(70-99)


 


Test


 5/12/20


00:57 5/12/20


06:43 5/12/20


08:50 





 


Glucose (Fingerstick)


 119 mg/dL


(70-99) 139 mg/dL


(70-99) 


 





 


Sodium Level


 


 


 145 mmol/L


(136-145) 





 


Potassium Level


 


 


 3.6 mmol/L


(3.5-5.1) 





 


Chloride Level


 


 


 105 mmol/L


() 





 


Carbon Dioxide Level


 


 


 36 mmol/L


(21-32) 





 


Anion Gap   4 (6-14)  


 


Blood Urea Nitrogen


 


 


 24 mg/dL


(7-20) 





 


Creatinine


 


 


 0.7 mg/dL


(0.6-1.0) 





 


Estimated GFR


(Cockcroft-Gault) 


 


 88.9 


 





 


BUN/Creatinine Ratio   34 (6-20)  


 


Glucose Level


 


 


 154 mg/dL


(70-99) 





 


Calcium Level


 


 


 8.6 mg/dL


(8.5-10.1) 





 


Total Bilirubin


 


 


 0.4 mg/dL


(0.2-1.0) 





 


Aspartate Amino Transf


(AST/SGOT) 


 


 50 U/L (15-37) 


 





 


Alanine Aminotransferase


(ALT/SGPT) 


 


 50 U/L (14-59) 


 





 


Alkaline Phosphatase


 


 


 119 U/L


() 





 


Total Protein


 


 


 6.4 g/dL


(6.4-8.2) 





 


Albumin


 


 


 1.8 g/dL


(3.4-5.0) 





 


Albumin/Globulin Ratio   0.4 (1.0-1.7)  








Laboratory Tests








Test


 5/11/20


12:05 5/11/20


19:38 5/12/20


00:57 5/12/20


06:43


 


Glucose (Fingerstick)


 181 mg/dL


(70-99) 156 mg/dL


(70-99) 119 mg/dL


(70-99) 139 mg/dL


(70-99)


 


Test


 5/12/20


08:50 


 


 





 


Sodium Level


 145 mmol/L


(136-145) 


 


 





 


Potassium Level


 3.6 mmol/L


(3.5-5.1) 


 


 





 


Chloride Level


 105 mmol/L


() 


 


 





 


Carbon Dioxide Level


 36 mmol/L


(21-32) 


 


 





 


Anion Gap 4 (6-14)    


 


Blood Urea Nitrogen


 24 mg/dL


(7-20) 


 


 





 


Creatinine


 0.7 mg/dL


(0.6-1.0) 


 


 





 


Estimated GFR


(Cockcroft-Gault) 88.9 


 


 


 





 


BUN/Creatinine Ratio 34 (6-20)    


 


Glucose Level


 154 mg/dL


(70-99) 


 


 





 


Calcium Level


 8.6 mg/dL


(8.5-10.1) 


 


 





 


Total Bilirubin


 0.4 mg/dL


(0.2-1.0) 


 


 





 


Aspartate Amino Transf


(AST/SGOT) 50 U/L (15-37) 


 


 


 





 


Alanine Aminotransferase


(ALT/SGPT) 50 U/L (14-59) 


 


 


 





 


Alkaline Phosphatase


 119 U/L


() 


 


 





 


Total Protein


 6.4 g/dL


(6.4-8.2) 


 


 





 


Albumin


 1.8 g/dL


(3.4-5.0) 


 


 





 


Albumin/Globulin Ratio 0.4 (1.0-1.7)    








Problem List


Problems


Medical Problems:


(1) Acute pancreatitis


Status: Acute  





(2) Cholelithiasis


Status: Acute  








Assessment/Plan


severe pancreatitis


cont supportive care and drains.











GAMAL ZHOU MD             May 12, 2020 09:34

## 2020-05-13 VITALS — DIASTOLIC BLOOD PRESSURE: 70 MMHG | SYSTOLIC BLOOD PRESSURE: 120 MMHG

## 2020-05-13 VITALS — DIASTOLIC BLOOD PRESSURE: 84 MMHG | SYSTOLIC BLOOD PRESSURE: 164 MMHG

## 2020-05-13 VITALS — SYSTOLIC BLOOD PRESSURE: 148 MMHG | DIASTOLIC BLOOD PRESSURE: 84 MMHG

## 2020-05-13 VITALS — SYSTOLIC BLOOD PRESSURE: 126 MMHG | DIASTOLIC BLOOD PRESSURE: 77 MMHG

## 2020-05-13 VITALS — SYSTOLIC BLOOD PRESSURE: 178 MMHG | DIASTOLIC BLOOD PRESSURE: 87 MMHG

## 2020-05-13 VITALS — DIASTOLIC BLOOD PRESSURE: 77 MMHG | SYSTOLIC BLOOD PRESSURE: 123 MMHG

## 2020-05-13 VITALS — DIASTOLIC BLOOD PRESSURE: 65 MMHG | SYSTOLIC BLOOD PRESSURE: 139 MMHG

## 2020-05-13 VITALS — SYSTOLIC BLOOD PRESSURE: 170 MMHG | DIASTOLIC BLOOD PRESSURE: 92 MMHG

## 2020-05-13 VITALS — DIASTOLIC BLOOD PRESSURE: 87 MMHG | SYSTOLIC BLOOD PRESSURE: 111 MMHG

## 2020-05-13 VITALS — DIASTOLIC BLOOD PRESSURE: 74 MMHG | SYSTOLIC BLOOD PRESSURE: 133 MMHG

## 2020-05-13 VITALS — SYSTOLIC BLOOD PRESSURE: 120 MMHG | DIASTOLIC BLOOD PRESSURE: 84 MMHG

## 2020-05-13 VITALS — SYSTOLIC BLOOD PRESSURE: 150 MMHG | DIASTOLIC BLOOD PRESSURE: 86 MMHG

## 2020-05-13 VITALS — SYSTOLIC BLOOD PRESSURE: 118 MMHG | DIASTOLIC BLOOD PRESSURE: 81 MMHG

## 2020-05-13 VITALS — SYSTOLIC BLOOD PRESSURE: 112 MMHG | DIASTOLIC BLOOD PRESSURE: 68 MMHG

## 2020-05-13 VITALS — SYSTOLIC BLOOD PRESSURE: 118 MMHG | DIASTOLIC BLOOD PRESSURE: 53 MMHG

## 2020-05-13 VITALS — DIASTOLIC BLOOD PRESSURE: 85 MMHG | SYSTOLIC BLOOD PRESSURE: 140 MMHG

## 2020-05-13 VITALS — DIASTOLIC BLOOD PRESSURE: 84 MMHG | SYSTOLIC BLOOD PRESSURE: 150 MMHG

## 2020-05-13 VITALS — DIASTOLIC BLOOD PRESSURE: 77 MMHG | SYSTOLIC BLOOD PRESSURE: 134 MMHG

## 2020-05-13 VITALS — DIASTOLIC BLOOD PRESSURE: 79 MMHG | SYSTOLIC BLOOD PRESSURE: 187 MMHG

## 2020-05-13 VITALS — SYSTOLIC BLOOD PRESSURE: 158 MMHG | DIASTOLIC BLOOD PRESSURE: 88 MMHG

## 2020-05-13 VITALS — SYSTOLIC BLOOD PRESSURE: 140 MMHG | DIASTOLIC BLOOD PRESSURE: 86 MMHG

## 2020-05-13 VITALS — SYSTOLIC BLOOD PRESSURE: 118 MMHG | DIASTOLIC BLOOD PRESSURE: 77 MMHG

## 2020-05-13 LAB
BASOPHILS # BLD AUTO: 0 X10^3/UL (ref 0–0.2)
BASOPHILS NFR BLD: 0 % (ref 0–3)
EOSINOPHIL NFR BLD: 0.1 X10^3/UL (ref 0–0.7)
EOSINOPHIL NFR BLD: 1 % (ref 0–3)
ERYTHROCYTE [DISTWIDTH] IN BLOOD BY AUTOMATED COUNT: 17.9 % (ref 11.5–14.5)
HCT VFR BLD CALC: 24 % (ref 36–47)
HGB BLD-MCNC: 7.7 G/DL (ref 12–15.5)
LYMPHOCYTES # BLD: 2.4 X10^3/UL (ref 1–4.8)
LYMPHOCYTES NFR BLD AUTO: 20 % (ref 24–48)
MCH RBC QN AUTO: 28 PG (ref 25–35)
MCHC RBC AUTO-ENTMCNC: 32 G/DL (ref 31–37)
MCV RBC AUTO: 88 FL (ref 79–100)
MONO #: 0.6 X10^3/UL (ref 0–1.1)
MONOCYTES NFR BLD: 5 % (ref 0–9)
NEUT #: 8.6 X10^3/UL (ref 1.8–7.7)
NEUTROPHILS NFR BLD AUTO: 73 % (ref 31–73)
PLATELET # BLD AUTO: 512 X10^3/UL (ref 140–400)
RBC # BLD AUTO: 2.72 X10^6/UL (ref 3.5–5.4)
WBC # BLD AUTO: 11.8 X10^3/UL (ref 4–11)

## 2020-05-13 PROCEDURE — 0W9B3ZZ DRAINAGE OF LEFT PLEURAL CAVITY, PERCUTANEOUS APPROACH: ICD-10-PCS | Performed by: RADIOLOGY

## 2020-05-13 RX ADMIN — DEXTROSE SCH MLS/HR: 50 INJECTION, SOLUTION INTRAVENOUS at 11:30

## 2020-05-13 RX ADMIN — DEXMEDETOMIDINE HYDROCHLORIDE PRN MLS/HR: 100 INJECTION, SOLUTION, CONCENTRATE INTRAVENOUS at 22:05

## 2020-05-13 RX ADMIN — DEXMEDETOMIDINE HYDROCHLORIDE PRN MLS/HR: 100 INJECTION, SOLUTION, CONCENTRATE INTRAVENOUS at 15:39

## 2020-05-13 RX ADMIN — HYDROMORPHONE HYDROCHLORIDE PRN MG: 2 INJECTION INTRAMUSCULAR; INTRAVENOUS; SUBCUTANEOUS at 00:34

## 2020-05-13 RX ADMIN — INSULIN LISPRO SCH UNITS: 100 INJECTION, SOLUTION INTRAVENOUS; SUBCUTANEOUS at 05:39

## 2020-05-13 RX ADMIN — METOCLOPRAMIDE PRN MG: 5 INJECTION, SOLUTION INTRAMUSCULAR; INTRAVENOUS at 06:16

## 2020-05-13 RX ADMIN — DEXMEDETOMIDINE HYDROCHLORIDE PRN MLS/HR: 100 INJECTION, SOLUTION, CONCENTRATE INTRAVENOUS at 08:26

## 2020-05-13 RX ADMIN — METOCLOPRAMIDE PRN MG: 5 INJECTION, SOLUTION INTRAMUSCULAR; INTRAVENOUS at 00:34

## 2020-05-13 RX ADMIN — MEROPENEM SCH MLS/HR: 1 INJECTION, POWDER, FOR SOLUTION INTRAVENOUS at 13:16

## 2020-05-13 RX ADMIN — MEROPENEM SCH MLS/HR: 1 INJECTION, POWDER, FOR SOLUTION INTRAVENOUS at 05:39

## 2020-05-13 RX ADMIN — DEXMEDETOMIDINE HYDROCHLORIDE PRN MLS/HR: 100 INJECTION, SOLUTION, CONCENTRATE INTRAVENOUS at 03:00

## 2020-05-13 RX ADMIN — HYDROMORPHONE HYDROCHLORIDE PRN MG: 2 INJECTION INTRAMUSCULAR; INTRAVENOUS; SUBCUTANEOUS at 19:22

## 2020-05-13 RX ADMIN — BACITRACIN SCH MLS/HR: 5000 INJECTION, POWDER, FOR SOLUTION INTRAMUSCULAR at 13:18

## 2020-05-13 RX ADMIN — INSULIN LISPRO SCH UNITS: 100 INJECTION, SOLUTION INTRAVENOUS; SUBCUTANEOUS at 23:53

## 2020-05-13 RX ADMIN — PANTOPRAZOLE SODIUM SCH MG: 40 INJECTION, POWDER, FOR SOLUTION INTRAVENOUS at 07:59

## 2020-05-13 RX ADMIN — INSULIN LISPRO SCH UNITS: 100 INJECTION, SOLUTION INTRAVENOUS; SUBCUTANEOUS at 11:27

## 2020-05-13 RX ADMIN — INSULIN LISPRO SCH UNITS: 100 INJECTION, SOLUTION INTRAVENOUS; SUBCUTANEOUS at 00:39

## 2020-05-13 RX ADMIN — INSULIN LISPRO SCH UNITS: 100 INJECTION, SOLUTION INTRAVENOUS; SUBCUTANEOUS at 17:58

## 2020-05-13 RX ADMIN — BUMETANIDE SCH MG: 0.25 INJECTION INTRAMUSCULAR; INTRAVENOUS at 08:00

## 2020-05-13 RX ADMIN — Medication PRN EACH: at 11:31

## 2020-05-13 RX ADMIN — MEROPENEM SCH MLS/HR: 1 INJECTION, POWDER, FOR SOLUTION INTRAVENOUS at 22:01

## 2020-05-13 NOTE — PDOC
Infectious Disease Note


Subjective


Subjective


feeling better says, awake nods appropriately


off vent, trach o2





ROS


ROS


no n/v/d/





Vital Sign


Vital Signs





Vital Signs








  Date Time  Temp Pulse Resp B/P (MAP) Pulse Ox O2 Delivery O2 Flow Rate FiO2


 


5/13/20 07:43      Trach Collar 8.0 


 


5/13/20 07:26     97   


 


5/13/20 07:00 98.8 106 34 139/65 (89)    





 98.8       











Physical Exam


PHYSICAL EXAM


GENERAL: Propped up in bed, appears weak, tired


HEENT:  oral cavity dry, NGT


NECK:  Trach/vent 


LUNGS: Improved aeration has humidified O2 via trach shield


HEART:  S1, S2, regular 


ABDOMEN: Distended, hypoactive BS, tender, + drains x 3


: [place] (4/14)


EXTREMITIES: Generalized edema, no cyanosis, SCDs bilaterally 


SKIN: Warm and dry.  No generalized rash.  


CNS: Nodded some to couple questions 


PICC(4/30) clean





Labs


Lab





Laboratory Tests








Test


 5/12/20


08:50 5/12/20


11:31 5/12/20


18:22 5/13/20


00:23


 


White Blood Count


 15.1 x10^3/uL


(4.0-11.0) 


 


 





 


Red Blood Count


 2.98 x10^6/uL


(3.50-5.40) 


 


 





 


Hemoglobin


 8.5 g/dL


(12.0-15.5) 


 


 





 


Hematocrit


 26.6 %


(36.0-47.0) 


 


 





 


Mean Corpuscular Volume 89 fL ()    


 


Mean Corpuscular Hemoglobin 28 pg (25-35)    


 


Mean Corpuscular Hemoglobin


Concent 32 g/dL


(31-37) 


 


 





 


Red Cell Distribution Width


 17.7 %


(11.5-14.5) 


 


 





 


Platelet Count


 522 x10^3/uL


(140-400) 


 


 





 


Neutrophils (%) (Auto) 70 % (31-73)    


 


Lymphocytes (%) (Auto) 25 % (24-48)    


 


Monocytes (%) (Auto) 4 % (0-9)    


 


Eosinophils (%) (Auto) 1 % (0-3)    


 


Basophils (%) (Auto) 0 % (0-3)    


 


Neutrophils # (Auto)


 10.5 x10^3/uL


(1.8-7.7) 


 


 





 


Lymphocytes # (Auto)


 3.7 x10^3/uL


(1.0-4.8) 


 


 





 


Monocytes # (Auto)


 0.7 x10^3/uL


(0.0-1.1) 


 


 





 


Eosinophils # (Auto)


 0.2 x10^3/uL


(0.0-0.7) 


 


 





 


Basophils # (Auto)


 0.1 x10^3/uL


(0.0-0.2) 


 


 





 


Sodium Level


 145 mmol/L


(136-145) 


 


 





 


Potassium Level


 3.6 mmol/L


(3.5-5.1) 


 


 





 


Chloride Level


 105 mmol/L


() 


 


 





 


Carbon Dioxide Level


 36 mmol/L


(21-32) 


 


 





 


Anion Gap 4 (6-14)    


 


Blood Urea Nitrogen


 24 mg/dL


(7-20) 


 


 





 


Creatinine


 0.7 mg/dL


(0.6-1.0) 


 


 





 


Estimated GFR


(Cockcroft-Gault) 88.9 


 


 


 





 


BUN/Creatinine Ratio 34 (6-20)    


 


Glucose Level


 154 mg/dL


(70-99) 


 


 





 


Calcium Level


 8.6 mg/dL


(8.5-10.1) 


 


 





 


Total Bilirubin


 0.4 mg/dL


(0.2-1.0) 


 


 





 


Aspartate Amino Transf


(AST/SGOT) 50 U/L (15-37) 


 


 


 





 


Alanine Aminotransferase


(ALT/SGPT) 50 U/L (14-59) 


 


 


 





 


Alkaline Phosphatase


 119 U/L


() 


 


 





 


Total Protein


 6.4 g/dL


(6.4-8.2) 


 


 





 


Albumin


 1.8 g/dL


(3.4-5.0) 


 


 





 


Albumin/Globulin Ratio 0.4 (1.0-1.7)    


 


Glucose (Fingerstick)


 


 179 mg/dL


(70-99) 142 mg/dL


(70-99) 169 mg/dL


(70-99)


 


Test


 5/13/20


05:06 5/13/20


05:15 


 





 


White Blood Count


 11.8 x10^3/uL


(4.0-11.0) 


 


 





 


Red Blood Count


 2.72 x10^6/uL


(3.50-5.40) 


 


 





 


Hemoglobin


 7.7 g/dL


(12.0-15.5) 


 


 





 


Hematocrit


 24.0 %


(36.0-47.0) 


 


 





 


Mean Corpuscular Volume 88 fL ()    


 


Mean Corpuscular Hemoglobin 28 pg (25-35)    


 


Mean Corpuscular Hemoglobin


Concent 32 g/dL


(31-37) 


 


 





 


Red Cell Distribution Width


 17.9 %


(11.5-14.5) 


 


 





 


Platelet Count


 512 x10^3/uL


(140-400) 


 


 





 


Neutrophils (%) (Auto) 73 % (31-73)    


 


Lymphocytes (%) (Auto) 20 % (24-48)    


 


Monocytes (%) (Auto) 5 % (0-9)    


 


Eosinophils (%) (Auto) 1 % (0-3)    


 


Basophils (%) (Auto) 0 % (0-3)    


 


Neutrophils # (Auto)


 8.6 x10^3/uL


(1.8-7.7) 


 


 





 


Lymphocytes # (Auto)


 2.4 x10^3/uL


(1.0-4.8) 


 


 





 


Monocytes # (Auto)


 0.6 x10^3/uL


(0.0-1.1) 


 


 





 


Eosinophils # (Auto)


 0.1 x10^3/uL


(0.0-0.7) 


 


 





 


Basophils # (Auto)


 0.0 x10^3/uL


(0.0-0.2) 


 


 





 


Glucose (Fingerstick)


 


 91 mg/dL


(70-99) 


 











Micro








Objective


Assessment


Fever  - better currently - 


Abd distention - U/S and CT reviewed s/p 0.4 L of opaque, debris-containing 

ascites was removed 5/6


Acute pancreatitis with persistent necrosis


  - 4/27 status post ROBERT drain placement + C paropsilosis. s/p additional drains 

5/8


Anemia - S/p PRBCs


Cholelithiasis with thickening of the gallbladder wall.


Leucocytosis improving


JED, hyperkalemia, Metabolic acidosis off dialysis


Acute hypoxic resp failure ,bilateral pleural effusion and atelectasis


hypocalcemia 


Prediabetes


HTN


s/p trach





Plan


Plan of Care


, merrem 4/8 -


Continue micafungin


f/u cultures 


Maintain aspiration precaution


Supportive care











LINN FRANZ MD               May 13, 2020 07:51

## 2020-05-13 NOTE — NUR
Pharmacy TPN Dosing Note



S: JESENIA RICH is a 49 year old F Currently receiving Central Continuous TPN started 
03/18/20



B:Pertinent PMH: Necrotizing pancreatitis

Height: 5 feet, 8 inches

Weight: 90.7 kg



Current diet: NPO 



LABS:

Sodium:    145 

Potassium: 3.6 

Chloride:  105 

Calcium:   8.6 

Corrected Calcium: 10.36 

Magnesium: 2.6 

CO2:       36 

SCr:       0.7 

Glucose:   119-179 

Albumin:   1.8 

AST:       50 

ALT:       50 



TPN FORMULA:

TPN TYPE:  Central Continuous

AMINO ACIDS:         90 gm

DEXTROSE:            225 gm

LIPIDS:              30 gm



POTASSIUM CHLORIDE:  80 mEq

MAGNESIUM:           20 mEq

INSULIN:             15 units

MULTIPLE VITAMIN:    10 ml

TRACE ELEMENTS:      1 ml(s)



TPN PLAN:  

Refill - labs in am





R: Continue TPN 

Will monitor electrolytes, glucose, and tolerance to TPN.



 Maribell Vazquez [facility], 05/13/20 1134

## 2020-05-13 NOTE — PDOC
Subjective:


Subjective:


Says she can't breathe.





Objective:


Objective:


D/w nurse - had a stool, lots of bilious output, ongoing nausea.


Vital Signs:





                                   Vital Signs








  Date Time  Temp Pulse Resp B/P (MAP) Pulse Ox O2 Delivery O2 Flow Rate FiO2


 


5/13/20 10:00  120 30 150/86 (107) 97  8.0 


 


5/13/20 07:43      Trach Collar  


 


5/13/20 07:00 98.8       





 98.8       








Labs:





Laboratory Tests








Test


 5/12/20


18:22 5/13/20


00:23 5/13/20


05:06 5/13/20


05:15


 


Glucose (Fingerstick) 142 mg/dL  169 mg/dL   91 mg/dL 


 


White Blood Count   11.8 x10^3/uL  


 


Red Blood Count   2.72 x10^6/uL  


 


Hemoglobin   7.7 g/dL  


 


Hematocrit   24.0 %  


 


Mean Corpuscular Volume   88 fL  


 


Mean Corpuscular Hemoglobin   28 pg  


 


Mean Corpuscular Hemoglobin


Concent 


 


 32 g/dL 


 





 


Red Cell Distribution Width   17.9 %  


 


Platelet Count   512 x10^3/uL  


 


Neutrophils (%) (Auto)   73 %  


 


Lymphocytes (%) (Auto)   20 %  


 


Monocytes (%) (Auto)   5 %  


 


Eosinophils (%) (Auto)   1 %  


 


Basophils (%) (Auto)   0 %  


 


Neutrophils # (Auto)   8.6 x10^3/uL  


 


Lymphocytes # (Auto)   2.4 x10^3/uL  


 


Monocytes # (Auto)   0.6 x10^3/uL  


 


Eosinophils # (Auto)   0.1 x10^3/uL  


 


Basophils # (Auto)   0.0 x10^3/uL  


 


Test


 5/13/20


11:15 


 


 





 


Glucose (Fingerstick) 183 mg/dL    





ORDERED:  ANAER/AEROB/GS                                                        

 


COMMENTS: ABDOMINAL FLUID                                             


 

--------------------------------------------------------------------------------


------------





  Procedure                         Result                                      

         


--------------------------------------

------------------------------------------------------





  ANAEROBIC-AEROBIC CULTURE  Final  


        Final report





  ANAEROBIC RES 1  Final  


        Comment





        No anaerobic growth in 72 hours.





  AEROBIC CULT  Final  


        Final report





  AEROBIC RES 1  Final  


        Yeast isolated.





        4+


        Request for further identification must be made


        within 1 week.





  GRAM STAIN  Final  


        Final report





  GRAM STAIN RES 1  Final  


        Comment





        No white blood cells seen.





  GRAM STAIN RES 2  Final  


        No organisms seen


Imaging:


CXR 5/12


Impression: 


1.  Moderate bilateral layering pleural effusions, unchanged.


2.  Patchy central and bibasilar opacities, unchanged.





Thoracentesis 5/12





PE:





GEN: NAD, up to chair - nurse present, encouraging her to cough


LUNGS: trach shield


HEART: tachycardic


ABD: softer, NG bilious


NEURO/PSYCH: A & O 3





A/P:


Gallstone pancreatitis, MOSF





--


Continue support.





Hemodynamically unstable?:  No


Is patient in severe pain?:  No


Is NPO status required?:  Yes











RAGHAVENDRA MURCIA         May 13, 2020 11:37

## 2020-05-13 NOTE — PDOC
[facility] [facility] PROGRESS NOTE


Chief Complaint


Chief Complaint


Acute hypoxic Respiratory failure requiring mechanical ventilation (on vent 

since 3/23)


Tracheostomy


bilateral pleural effusions/pulm edema 


Sepsis


Severe Acute gallstone pancreatitis (not a surgical candidate at this time) with

necrosis


Acute kidney failure now requiring dialysis


Salpingitis


Gallstones (Calculus of gallbladder with acute cholecystitis without 

obstruction)


HTN 


Leukocytosis 


Hypoxia


Uterine fibroid


Intractable pain


Intractable nausea


Covid 19 negative. 


Acute on chronic anemia 


EEG: No seizure activityFever  - better currently - intermittent could be from 

underlying pancreatitis blood cults 5/4 - neg so far


? Ileus with vomiting


Abd distention - U/S and CT reviewed s/p 0.4 L of opaque, debris-containing 

ascites was removed 5/6


Acute pancreatitis with persistent necrosis


- 4/27 status post ROBERT drain placement + C paropsilosis. s/p additional drains 

5/8


Anemia - S/p PRBCs


Cholelithiasis with thickening of the gallbladder wall.


Leucocytosis improving


JED, hyperkalemia, Metabolic acidosis off dialysis


Acute hypoxic resp failure ,bilateral pleural effusion and atelectasis


hypocalcemia 


Prediabetes


HTN


s/p trach


ESRD on HD


Hyperglycemia





History of Present Illness


History of Present Illness


5/13/2020


Patient seen and examined in the ICU


She is up in the chair with O2 via trach shield


Extremely anxious


Cannot talk but is trying to write things to me although I cannot understand 

what she is writing


Discussed with RN


Chart reviewed


Still on IV TPN


Still extremely ill








5/12/2020


Patient seen and examined in the ICU


She now has a trach shield


Pleasantly confused but sedated with Precedex


Chart reviewed


Discussed with RN


She has IV TPN


Still very critically ill





5/11/2020


Patient seen and examined


She remains on the vent but spontaneous respirations with 20 of pressure support

and 30% FiO2


He still has NG to suction


Appears extremely ill and weak and confused


Has IV TPN Precedex antibiotics


[place] is to bedside drainage


She has SCDs and ProCare boots


She is on IV meropenem and daptomycin and micafungin


Chart reviewed


Discussed with RN


Patient is still critically ill








Ms Tadeo is a 50yo F w/ PMHx HTN, prediabetes who presented to the emergency 

room with complaints of abdominal pain on 3/16/2020. Found with Lipase 08933, 

, , Bilirubin 1.4.


CT abdomen confirms pancreatic inflammation, peripancreatic fluid and 

inflammatory changes around the pancreas consistent with pancreatitis. 

Cholelithiasis and 1.4cm uterine fibroid as well as possible left salpingitis. 

Admitted for further care


GI, General surgery, ID, Pulm consulted.





3/17: PICC placed per IR. Renal US negative. Started on levophed. Repeat CT 

abdomen w/ necrosis; 3/18: Dialysis catheter per nephrology; 3/19: On BiPAP; 

3/20: BiPAP, dialysis; 3/21: Overnight Tmax 101.7 , still on BiPAP FiO2 40%, 

still on low dose Levophed gtt, TPN initiated. On dialysis


4/6: Tracheostomy; 4/12: S/p tracheostomy on vent spontaneous respirations with 

5 of pressure support 35% FiO2, rectal tube and a [place], off pressors; 

4/14:Still on vent via trach. Removed PICC and CVC LIJ and replaced. CT 

chest/abd/pelvis with bilateral pleural effusion and ascites.


4/15: Renal function stable. Still on vent. More interactive today. Miming wish 

for food. Plan discussed for thoracentesis/paracentesis with daughters today. 

They were under impression patient was doing worse due to a miscommunication 

which has been clarified over the phone. 4.3L removed.


4/17: Febrile overnight 101.8F. More interactive, still on vent. Asking for ice 

by miming; 4/18: Afebrile overnight. TMax last 24 hours 100.6F. Hb 7.1. 

Interactive when awake. 4/22: Transfusion 1u PRBC (6U total since admit)


4/23-4/26: TPN and precedex, vent.


4/27: Tmax 101F overnight. Hb 8.2. HD cath out since 4/24. Alert. On vent SIMV 

35% FiO2. Surgery: ex-lap, no naif or pancreatic necrosectomy 2/2 profound 

inflammation.


4/28: Seen POD #1. Afebrile overnight. BUN 62. CBC WNL today.Remains on vent via

trach, TPN. Able to point today and indicate she wishes her daughters and Rolf 

to be involved in her care.


4/29: Hb 7.4, Na 151. Remains on vent via trach, TPN. off HD for now. Not 

tolerating trach shield well.


4/30: Negative US for UE DVT. More relaxed today. Has some increase in UOP. 

Tolerating vent well. She is requesting to eat and drink via writing. T max 100F

24 hours ago, but 101F at 1200. Right PICC placed. Speaking valve for half the 

day in whisper


5/1: Na 153 today. Good UOP. Tmax 101F at 1200 on 4/30. She becomes a bit 

anxious and did not sleep much last night, wishes to try to sleep this morning


5/2: Able to speak well with speaking valve today. Na 153, K2.9, Mg 1.8, Cr 1. 

100.4F axillary temp overnight. Asking for food.


5/3: Afebrile overnight. ABG with pH 7.27/75/123. Hb 7.1. Na 153. PCA settings 

decreased


5/4: No acute events reported overnight, case discussed with nursing staff 

patient in no acute distress no complaints during my visit


5/5: Patient responding to verbal stimuli.  She is saturating well and not 

requiring ventilation support, no acute events reported overnight seems to be s

lowly improving


5/6: Patient requiring transfusion of packed red blood cells due to anemia, no 

evidence of acute bleeding, appreciate GI consultant recommendations


5/7: Patient required ventilatory support given that she desaturated, she is 

lying in bed in mild distress, case discussed with nursing staff, no evidence of

bleeding, h an h noted. 


5/8: Underwent IR procedure as follows


BRIEF OPERATIVE NOTE


Pre-Op Diagnosis


Pancreatitis with pseudocysts, suspected infection


Post-Op Diagnosis


same


Procedure Performed


CT abdominal Drains x 3


Surgeon


Hardy


Anesthesia Type:  Conscious Sedation


Findings


3 abdominal drains, 14F, with turbid pancreatic fluid and necrotic debris in 

each.


Complications


No immediate








5/9: Patient today somewhat restless and having bilious secretions from ET tube,

imaging studies ordered, discussed with consultant. Pretty poor prognosis, 

hopefully is not a fistula, poor surgical candidate. 





5/10: Imaging with no acute events, she seems more stable today compared to 

yesterday. Encouraged as much activity as possible patient at high risk for 

severe depression.





Vitals/I&O


Vitals/I&O:





                                   Vital Signs








  Date Time  Temp Pulse Resp B/P (MAP) Pulse Ox O2 Delivery O2 Flow Rate FiO2


 


5/13/20 10:00  120 30 150/86 (107) 97  8.0 


 


5/13/20 07:43      Trach Collar  


 


5/13/20 07:00 98.8       





 98.8       














                                    I & O   


 


 5/12/20 5/12/20 5/13/20





 14:59 22:59 06:59


 


Intake Total 100 ml 2166 ml 


 


Output Total 3695 ml 825 ml 1100 ml


 


Balance -3595 ml 1341 ml -1100 ml











Physical Exam


Physical Exam:


GENERAL: Up in chair very anxious extremely weak shaky probably confused but on 

Precedex


HEENT:  oral cavity dry, NGT


NECK:  Trach/vent 


LUNGS: Improved aeration has humidified O2 via trach shield


HEART:  S1, S2, regular 


ABDOMEN: Distended, hypoactive BS, tender, + drains x 3


: [place] (4/14)


EXTREMITIES: Generalized edema, no cyanosis, SCDs bilaterally 


SKIN: Warm and dry.  No generalized rash.  


CNS: Nodded some to couple questions 


PICC(4/30) clean


General:  Alert, Oriented X3, Cooperative, mild distress


Heart:  Regular rate, Normal S1, Normal S2, No murmurs, Gallops


Lungs:  Other (decrease bs)


Abdomen:  Soft, No tenderness, Other (drains in place)


Extremities:  No clubbing, No cyanosis, No edema, Normal pulses, No 

tenderness/swelling


Skin:  Other (warm, dry)





Labs


Labs:





Laboratory Tests








Test


 5/12/20


18:22 5/13/20


00:23 5/13/20


05:06 5/13/20


05:15


 


Glucose (Fingerstick)


 142 mg/dL


(70-99) 169 mg/dL


(70-99) 


 91 mg/dL


(70-99)


 


White Blood Count


 


 


 11.8 x10^3/uL


(4.0-11.0) 





 


Red Blood Count


 


 


 2.72 x10^6/uL


(3.50-5.40) 





 


Hemoglobin


 


 


 7.7 g/dL


(12.0-15.5) 





 


Hematocrit


 


 


 24.0 %


(36.0-47.0) 





 


Mean Corpuscular Volume   88 fL ()  


 


Mean Corpuscular Hemoglobin   28 pg (25-35)  


 


Mean Corpuscular Hemoglobin


Concent 


 


 32 g/dL


(31-37) 





 


Red Cell Distribution Width


 


 


 17.9 %


(11.5-14.5) 





 


Platelet Count


 


 


 512 x10^3/uL


(140-400) 





 


Neutrophils (%) (Auto)   73 % (31-73)  


 


Lymphocytes (%) (Auto)   20 % (24-48)  


 


Monocytes (%) (Auto)   5 % (0-9)  


 


Eosinophils (%) (Auto)   1 % (0-3)  


 


Basophils (%) (Auto)   0 % (0-3)  


 


Neutrophils # (Auto)


 


 


 8.6 x10^3/uL


(1.8-7.7) 





 


Lymphocytes # (Auto)


 


 


 2.4 x10^3/uL


(1.0-4.8) 





 


Monocytes # (Auto)


 


 


 0.6 x10^3/uL


(0.0-1.1) 





 


Eosinophils # (Auto)


 


 


 0.1 x10^3/uL


(0.0-0.7) 





 


Basophils # (Auto)


 


 


 0.0 x10^3/uL


(0.0-0.2) 





 


Test


 5/13/20


11:15 


 


 





 


Glucose (Fingerstick)


 183 mg/dL


(70-99) 


 


 














Review of Systems


Review of Systems:


Unable to obtain she is too confused and cannot talk





Assessment and Plan


Assessmemt and Plan


Problems


Medical Problems:


(1) Acute pancreatitis


Status: Acute  





(2) Cholelithiasis


Status: Acute  





Acute hypoxic Respiratory failure requiring mechanical ventilation (on vent 

since 3/23)


Tracheostomy


bilateral pleural effusions/pulm edema 


Sepsis


Severe Acute gallstone pancreatitis (not a surgical candidate at this time) with

necrosis


Acute kidney failure now requiring dialysis


Salpingitis


Gallstones (Calculus of gallbladder with acute cholecystitis without 

obstruction)


HTN 


Leukocytosis 


Hypoxia


Uterine fibroid


Intractable pain


Intractable nausea


Covid 19 negative. 


Acute on chronic anemia 


EEG: No seizure activityFever  - better currently - intermittent could be from 

underlying pancreatitis blood cults 5/4 - neg so far


? Ileus with vomiting


Abd distention - U/S and CT reviewed s/p 0.4 L of opaque, debris-containing 

ascites was removed 5/6


Acute pancreatitis with persistent necrosis


- 4/27 status post ROBERT drain placement + C paropsilosis. s/p additional drains 

5/8


Anemia - S/p PRBCs


Cholelithiasis with thickening of the gallbladder wall.


Leucocytosis improving


JED, hyperkalemia, Metabolic acidosis off dialysis


Acute hypoxic resp failure ,bilateral pleural effusion and atelectasis


hypocalcemia 


Prediabetes


HTN


s/p trach


ESRD on HD


Hyperglycemia








Plan


ICU monitoring


Wound care


Humidified O2 via trach shield


NG suctioning


Continue IV antibiotics and micafungin


Sedation with Precedex


TPN protocol


Continue [place] to bedside drainage


Tracheostomy care


Hope to eventually move towards decannulation


Trend labs


Appreciate subspecialist input


She is critically ill


Total time 32-minute





Comment


Review of Relevant


I have reviewed the following items josy (where applicable) has been applied.


Medications:





Current Medications








 Medications


  (Trade)  Dose


 Ordered  Sig/Yee


 Route


 PRN Reason  Start Time


 Stop Time Status Last Admin


Dose Admin


 


 Potassium


 Chloride 80 meq/


 Magnesium Sulfate


 20 meq/


 Multivitamins 10


 ml/Chromium/


 Copper/Manganese/


 Seleni/Zn 0.5 ml/


 Insulin Human


 Regular 15 unit/


 Total Parenteral


 Nutrition/Amino


 Acids/Dextrose/


 Fat Emulsion


 Intravenous  1,920 ml @ 


 80 mls/hr  TPN  CONT


 IV


   5/12/20 22:00


 5/13/20 21:59  5/12/20 21:40





 


 Lidocaine HCl


  (Buffered


 Lidocaine 1%)  6 ml  1X  ONCE


 INJ


   5/12/20 14:15


 5/12/20 14:16 DC 5/12/20 14:15














Hemodynamically unstable?:  No


Is patient in severe pain?:  No


Is NPO status required?:  Yes











HECTOR MASON III DO           May 13, 2020 11:44

## 2020-05-13 NOTE — NUR
SW following. Discussed with RN, pt still not ready for discharge. Trach has been capped. SW 
will continue to follow.

## 2020-05-13 NOTE — RAD
Ultrasound-guided left-sided thoracentesis 5/13/2020 10:57 AM



Indication:  ct guided left thoracentesis

  

Procedure: Informed consent was obtained. A timeout procedure was performed.

Sonographic evaluation of the left chest was performed demonstrating moderate

pleural effusion. The  left posterior chest was prepped and draped in sterile

fashion. 1% lidocaine without epinephrine was administered for local

anesthesia. Real-time ultrasonographic guidance was used in passing a 5 Serbian

Yueh catheter into the left  pleural space. 850 cc of serous pleural fluid was

removed. Samples of fluid were sent to the lab for further evaluation per

ordering physician request.   The catheter was removed and pressure held to

achieve hemostasis. A sterile dressing was applied. No immediate complications

were identified. The patient tolerated the procedure well. 





Impression:   Left sided ultrasound-guided thoracentesis

## 2020-05-13 NOTE — PDOC
SURGICAL PROGRESS NOTE


Subjective


Pt awake on vent, some abd pain


Vital Signs





Vital Signs








  Date Time  Temp Pulse Resp B/P (MAP) Pulse Ox O2 Delivery O2 Flow Rate FiO2


 


5/13/20 10:00  120 30 150/86 (107) 97  8.0 


 


5/13/20 07:43      Trach Collar  


 


5/13/20 07:00 98.8       





 98.8       








I&O











Intake and Output 


 


 5/13/20





 07:00


 


Intake Total 2266 ml


 


Output Total 5670 ml


 


Balance -3404 ml


 


 


 


IV Total 2266 ml


 


Output Urine Total 4350 ml


 


Chest Tube Drainage Total 850 ml


 


Drainage Total 470 ml








General:  Alert, Oriented X3, Cooperative, mild distress


Abdomen:  Soft, No tenderness, Other (drains in place)


Labs





Laboratory Tests








Test


 5/11/20


12:05 5/11/20


19:38 5/12/20


00:57 5/12/20


06:43


 


Glucose (Fingerstick)


 181 mg/dL


(70-99) 156 mg/dL


(70-99) 119 mg/dL


(70-99) 139 mg/dL


(70-99)


 


Test


 5/12/20


08:50 5/12/20


11:31 5/12/20


18:22 5/13/20


00:23


 


White Blood Count


 15.1 x10^3/uL


(4.0-11.0) 


 


 





 


Red Blood Count


 2.98 x10^6/uL


(3.50-5.40) 


 


 





 


Hemoglobin


 8.5 g/dL


(12.0-15.5) 


 


 





 


Hematocrit


 26.6 %


(36.0-47.0) 


 


 





 


Mean Corpuscular Volume 89 fL ()    


 


Mean Corpuscular Hemoglobin 28 pg (25-35)    


 


Mean Corpuscular Hemoglobin


Concent 32 g/dL


(31-37) 


 


 





 


Red Cell Distribution Width


 17.7 %


(11.5-14.5) 


 


 





 


Platelet Count


 522 x10^3/uL


(140-400) 


 


 





 


Neutrophils (%) (Auto) 70 % (31-73)    


 


Lymphocytes (%) (Auto) 25 % (24-48)    


 


Monocytes (%) (Auto) 4 % (0-9)    


 


Eosinophils (%) (Auto) 1 % (0-3)    


 


Basophils (%) (Auto) 0 % (0-3)    


 


Neutrophils # (Auto)


 10.5 x10^3/uL


(1.8-7.7) 


 


 





 


Lymphocytes # (Auto)


 3.7 x10^3/uL


(1.0-4.8) 


 


 





 


Monocytes # (Auto)


 0.7 x10^3/uL


(0.0-1.1) 


 


 





 


Eosinophils # (Auto)


 0.2 x10^3/uL


(0.0-0.7) 


 


 





 


Basophils # (Auto)


 0.1 x10^3/uL


(0.0-0.2) 


 


 





 


Sodium Level


 145 mmol/L


(136-145) 


 


 





 


Potassium Level


 3.6 mmol/L


(3.5-5.1) 


 


 





 


Chloride Level


 105 mmol/L


() 


 


 





 


Carbon Dioxide Level


 36 mmol/L


(21-32) 


 


 





 


Anion Gap 4 (6-14)    


 


Blood Urea Nitrogen


 24 mg/dL


(7-20) 


 


 





 


Creatinine


 0.7 mg/dL


(0.6-1.0) 


 


 





 


Estimated GFR


(Cockcroft-Gault) 88.9 


 


 


 





 


BUN/Creatinine Ratio 34 (6-20)    


 


Glucose Level


 154 mg/dL


(70-99) 


 


 





 


Calcium Level


 8.6 mg/dL


(8.5-10.1) 


 


 





 


Total Bilirubin


 0.4 mg/dL


(0.2-1.0) 


 


 





 


Aspartate Amino Transf


(AST/SGOT) 50 U/L (15-37) 


 


 


 





 


Alanine Aminotransferase


(ALT/SGPT) 50 U/L (14-59) 


 


 


 





 


Alkaline Phosphatase


 119 U/L


() 


 


 





 


Total Protein


 6.4 g/dL


(6.4-8.2) 


 


 





 


Albumin


 1.8 g/dL


(3.4-5.0) 


 


 





 


Albumin/Globulin Ratio 0.4 (1.0-1.7)    


 


Glucose (Fingerstick)


 


 179 mg/dL


(70-99) 142 mg/dL


(70-99) 169 mg/dL


(70-99)


 


Test


 5/13/20


05:06 5/13/20


05:15 


 





 


White Blood Count


 11.8 x10^3/uL


(4.0-11.0) 


 


 





 


Red Blood Count


 2.72 x10^6/uL


(3.50-5.40) 


 


 





 


Hemoglobin


 7.7 g/dL


(12.0-15.5) 


 


 





 


Hematocrit


 24.0 %


(36.0-47.0) 


 


 





 


Mean Corpuscular Volume 88 fL ()    


 


Mean Corpuscular Hemoglobin 28 pg (25-35)    


 


Mean Corpuscular Hemoglobin


Concent 32 g/dL


(31-37) 


 


 





 


Red Cell Distribution Width


 17.9 %


(11.5-14.5) 


 


 





 


Platelet Count


 512 x10^3/uL


(140-400) 


 


 





 


Neutrophils (%) (Auto) 73 % (31-73)    


 


Lymphocytes (%) (Auto) 20 % (24-48)    


 


Monocytes (%) (Auto) 5 % (0-9)    


 


Eosinophils (%) (Auto) 1 % (0-3)    


 


Basophils (%) (Auto) 0 % (0-3)    


 


Neutrophils # (Auto)


 8.6 x10^3/uL


(1.8-7.7) 


 


 





 


Lymphocytes # (Auto)


 2.4 x10^3/uL


(1.0-4.8) 


 


 





 


Monocytes # (Auto)


 0.6 x10^3/uL


(0.0-1.1) 


 


 





 


Eosinophils # (Auto)


 0.1 x10^3/uL


(0.0-0.7) 


 


 





 


Basophils # (Auto)


 0.0 x10^3/uL


(0.0-0.2) 


 


 





 


Glucose (Fingerstick)


 


 91 mg/dL


(70-99) 


 











Laboratory Tests








Test


 5/12/20


11:31 5/12/20


18:22 5/13/20


00:23 5/13/20


05:06


 


Glucose (Fingerstick)


 179 mg/dL


(70-99) 142 mg/dL


(70-99) 169 mg/dL


(70-99) 





 


White Blood Count


 


 


 


 11.8 x10^3/uL


(4.0-11.0)


 


Red Blood Count


 


 


 


 2.72 x10^6/uL


(3.50-5.40)


 


Hemoglobin


 


 


 


 7.7 g/dL


(12.0-15.5)


 


Hematocrit


 


 


 


 24.0 %


(36.0-47.0)


 


Mean Corpuscular Volume    88 fL () 


 


Mean Corpuscular Hemoglobin    28 pg (25-35) 


 


Mean Corpuscular Hemoglobin


Concent 


 


 


 32 g/dL


(31-37)


 


Red Cell Distribution Width


 


 


 


 17.9 %


(11.5-14.5)


 


Platelet Count


 


 


 


 512 x10^3/uL


(140-400)


 


Neutrophils (%) (Auto)    73 % (31-73) 


 


Lymphocytes (%) (Auto)    20 % (24-48) 


 


Monocytes (%) (Auto)    5 % (0-9) 


 


Eosinophils (%) (Auto)    1 % (0-3) 


 


Basophils (%) (Auto)    0 % (0-3) 


 


Neutrophils # (Auto)


 


 


 


 8.6 x10^3/uL


(1.8-7.7)


 


Lymphocytes # (Auto)


 


 


 


 2.4 x10^3/uL


(1.0-4.8)


 


Monocytes # (Auto)


 


 


 


 0.6 x10^3/uL


(0.0-1.1)


 


Eosinophils # (Auto)


 


 


 


 0.1 x10^3/uL


(0.0-0.7)


 


Basophils # (Auto)


 


 


 


 0.0 x10^3/uL


(0.0-0.2)


 


Test


 5/13/20


05:15 


 


 





 


Glucose (Fingerstick)


 91 mg/dL


(70-99) 


 


 











Problem List


Problems


Medical Problems:


(1) Acute pancreatitis


Status: Acute  





(2) Cholelithiasis


Status: Acute  








Assessment/Plan


severe pancreatitis


cont supportive care, no current surgical plans.











GAMAL ZHOU MD             May 13, 2020 10:58

## 2020-05-13 NOTE — NUR
pt assisted over to rehab chair. total lift transfer. pt did put some weight on feet.

pt attempting to distract herself by watching tv.

RT placed cap on trach for a trial. dr john at bedside.

## 2020-05-13 NOTE — PDOC
PULMONARY PROGRESS NOTES


Subjective


Patient intubated on 3/23 , s/p trach 4/6, awake alert follows commands


placed on TS 


s/p left tap , 3litres removed


Vitals





Vital Signs








  Date Time  Temp Pulse Resp B/P (MAP) Pulse Ox O2 Delivery O2 Flow Rate FiO2


 


5/13/20 09:08  110 32 140/86 (104) 97  8.0 


 


5/13/20 07:43      Trach Collar  


 


5/13/20 07:00 98.8       





 98.8       








ROS:  No Chest Pain, No Abdominal Pain, No Increase Cough


General:  Alert, No acute distress


Lungs:  Other (decrease bs)


Cardiovascular:  S1, S2


Abdomen:  Soft, Non-tender, Other


Neuro Exam:  Alert


Extremities:  Other (+3 generalized edema )


Skin:  Warm, Dry


Labs





Laboratory Tests








Test


 5/11/20


12:05 5/11/20


19:38 5/12/20


00:57 5/12/20


06:43


 


Glucose (Fingerstick)


 181 mg/dL


(70-99) 156 mg/dL


(70-99) 119 mg/dL


(70-99) 139 mg/dL


(70-99)


 


Test


 5/12/20


08:50 5/12/20


11:31 5/12/20


18:22 5/13/20


00:23


 


White Blood Count


 15.1 x10^3/uL


(4.0-11.0) 


 


 





 


Red Blood Count


 2.98 x10^6/uL


(3.50-5.40) 


 


 





 


Hemoglobin


 8.5 g/dL


(12.0-15.5) 


 


 





 


Hematocrit


 26.6 %


(36.0-47.0) 


 


 





 


Mean Corpuscular Volume 89 fL ()    


 


Mean Corpuscular Hemoglobin 28 pg (25-35)    


 


Mean Corpuscular Hemoglobin


Concent 32 g/dL


(31-37) 


 


 





 


Red Cell Distribution Width


 17.7 %


(11.5-14.5) 


 


 





 


Platelet Count


 522 x10^3/uL


(140-400) 


 


 





 


Neutrophils (%) (Auto) 70 % (31-73)    


 


Lymphocytes (%) (Auto) 25 % (24-48)    


 


Monocytes (%) (Auto) 4 % (0-9)    


 


Eosinophils (%) (Auto) 1 % (0-3)    


 


Basophils (%) (Auto) 0 % (0-3)    


 


Neutrophils # (Auto)


 10.5 x10^3/uL


(1.8-7.7) 


 


 





 


Lymphocytes # (Auto)


 3.7 x10^3/uL


(1.0-4.8) 


 


 





 


Monocytes # (Auto)


 0.7 x10^3/uL


(0.0-1.1) 


 


 





 


Eosinophils # (Auto)


 0.2 x10^3/uL


(0.0-0.7) 


 


 





 


Basophils # (Auto)


 0.1 x10^3/uL


(0.0-0.2) 


 


 





 


Sodium Level


 145 mmol/L


(136-145) 


 


 





 


Potassium Level


 3.6 mmol/L


(3.5-5.1) 


 


 





 


Chloride Level


 105 mmol/L


() 


 


 





 


Carbon Dioxide Level


 36 mmol/L


(21-32) 


 


 





 


Anion Gap 4 (6-14)    


 


Blood Urea Nitrogen


 24 mg/dL


(7-20) 


 


 





 


Creatinine


 0.7 mg/dL


(0.6-1.0) 


 


 





 


Estimated GFR


(Cockcroft-Gault) 88.9 


 


 


 





 


BUN/Creatinine Ratio 34 (6-20)    


 


Glucose Level


 154 mg/dL


(70-99) 


 


 





 


Calcium Level


 8.6 mg/dL


(8.5-10.1) 


 


 





 


Total Bilirubin


 0.4 mg/dL


(0.2-1.0) 


 


 





 


Aspartate Amino Transf


(AST/SGOT) 50 U/L (15-37) 


 


 


 





 


Alanine Aminotransferase


(ALT/SGPT) 50 U/L (14-59) 


 


 


 





 


Alkaline Phosphatase


 119 U/L


() 


 


 





 


Total Protein


 6.4 g/dL


(6.4-8.2) 


 


 





 


Albumin


 1.8 g/dL


(3.4-5.0) 


 


 





 


Albumin/Globulin Ratio 0.4 (1.0-1.7)    


 


Glucose (Fingerstick)


 


 179 mg/dL


(70-99) 142 mg/dL


(70-99) 169 mg/dL


(70-99)


 


Test


 5/13/20


05:06 5/13/20


05:15 


 





 


White Blood Count


 11.8 x10^3/uL


(4.0-11.0) 


 


 





 


Red Blood Count


 2.72 x10^6/uL


(3.50-5.40) 


 


 





 


Hemoglobin


 7.7 g/dL


(12.0-15.5) 


 


 





 


Hematocrit


 24.0 %


(36.0-47.0) 


 


 





 


Mean Corpuscular Volume 88 fL ()    


 


Mean Corpuscular Hemoglobin 28 pg (25-35)    


 


Mean Corpuscular Hemoglobin


Concent 32 g/dL


(31-37) 


 


 





 


Red Cell Distribution Width


 17.9 %


(11.5-14.5) 


 


 





 


Platelet Count


 512 x10^3/uL


(140-400) 


 


 





 


Neutrophils (%) (Auto) 73 % (31-73)    


 


Lymphocytes (%) (Auto) 20 % (24-48)    


 


Monocytes (%) (Auto) 5 % (0-9)    


 


Eosinophils (%) (Auto) 1 % (0-3)    


 


Basophils (%) (Auto) 0 % (0-3)    


 


Neutrophils # (Auto)


 8.6 x10^3/uL


(1.8-7.7) 


 


 





 


Lymphocytes # (Auto)


 2.4 x10^3/uL


(1.0-4.8) 


 


 





 


Monocytes # (Auto)


 0.6 x10^3/uL


(0.0-1.1) 


 


 





 


Eosinophils # (Auto)


 0.1 x10^3/uL


(0.0-0.7) 


 


 





 


Basophils # (Auto)


 0.0 x10^3/uL


(0.0-0.2) 


 


 





 


Glucose (Fingerstick)


 


 91 mg/dL


(70-99) 


 











Laboratory Tests








Test


 5/12/20


11:31 5/12/20


18:22 5/13/20


00:23 5/13/20


05:06


 


Glucose (Fingerstick)


 179 mg/dL


(70-99) 142 mg/dL


(70-99) 169 mg/dL


(70-99) 





 


White Blood Count


 


 


 


 11.8 x10^3/uL


(4.0-11.0)


 


Red Blood Count


 


 


 


 2.72 x10^6/uL


(3.50-5.40)


 


Hemoglobin


 


 


 


 7.7 g/dL


(12.0-15.5)


 


Hematocrit


 


 


 


 24.0 %


(36.0-47.0)


 


Mean Corpuscular Volume    88 fL () 


 


Mean Corpuscular Hemoglobin    28 pg (25-35) 


 


Mean Corpuscular Hemoglobin


Concent 


 


 


 32 g/dL


(31-37)


 


Red Cell Distribution Width


 


 


 


 17.9 %


(11.5-14.5)


 


Platelet Count


 


 


 


 512 x10^3/uL


(140-400)


 


Neutrophils (%) (Auto)    73 % (31-73) 


 


Lymphocytes (%) (Auto)    20 % (24-48) 


 


Monocytes (%) (Auto)    5 % (0-9) 


 


Eosinophils (%) (Auto)    1 % (0-3) 


 


Basophils (%) (Auto)    0 % (0-3) 


 


Neutrophils # (Auto)


 


 


 


 8.6 x10^3/uL


(1.8-7.7)


 


Lymphocytes # (Auto)


 


 


 


 2.4 x10^3/uL


(1.0-4.8)


 


Monocytes # (Auto)


 


 


 


 0.6 x10^3/uL


(0.0-1.1)


 


Eosinophils # (Auto)


 


 


 


 0.1 x10^3/uL


(0.0-0.7)


 


Basophils # (Auto)


 


 


 


 0.0 x10^3/uL


(0.0-0.2)


 


Test


 5/13/20


05:15 


 


 





 


Glucose (Fingerstick)


 91 mg/dL


(70-99) 


 


 











Medications





Active Scripts








 Medications  Dose


 Route/Sig


 Max Daily Dose Days Date Category


 


 Bisoprolol


 Fumarate 5 Mg


 Tablet  10 Mg


 PO DAILY


   3/16/20 Reported








Comments


CXR  5/12/2020





persistent moderate effusions





Impression


.


IMPRESSION:


1.  Acute hypoxemic respiratory failure secondary to ARDS status post trach,


2.  Gallstone pancreatitis


3.  Severe metabolic acidosis.stable


4.  Acute kidney injury-stable, Off HD-- continue to improve 


5.  Acute gallstone pancreatitis.


6.  Hypoalbuminemia.


7.  Moderate persistent effusions, s/p left thora  5/12


8.  Fever-  Per ID, per surgery--resolved 


9.  Chronic anemia


10. Covid 19 testing negative


11. Moderate to large ascites-S/P paracentisis


12.S/P paracentisis with 4 liters removed on 4/15/20


13. S/P IR drain placement on 5/8/2020





Plan


.


d/w IR , s/p  thoracentesis, 5/12, 3 litres removed


TS all day


Follow surgery recs-- S/P 3 drain placed in IR on 5/8/2020


Follow ID recs for [id number]


Follow nephrology recs 


Continue TPN 


DVT/GI PPX: heparin SQ/ protonix 


D/W RN and RT





CODE:SHARYN VALDIVIA MD                 May 13, 2020 09:49

## 2020-05-14 VITALS — DIASTOLIC BLOOD PRESSURE: 76 MMHG | SYSTOLIC BLOOD PRESSURE: 144 MMHG

## 2020-05-14 VITALS — SYSTOLIC BLOOD PRESSURE: 115 MMHG | DIASTOLIC BLOOD PRESSURE: 72 MMHG

## 2020-05-14 VITALS — SYSTOLIC BLOOD PRESSURE: 115 MMHG | DIASTOLIC BLOOD PRESSURE: 68 MMHG

## 2020-05-14 VITALS — SYSTOLIC BLOOD PRESSURE: 121 MMHG | DIASTOLIC BLOOD PRESSURE: 70 MMHG

## 2020-05-14 VITALS — DIASTOLIC BLOOD PRESSURE: 68 MMHG | SYSTOLIC BLOOD PRESSURE: 144 MMHG

## 2020-05-14 VITALS — SYSTOLIC BLOOD PRESSURE: 156 MMHG | DIASTOLIC BLOOD PRESSURE: 88 MMHG

## 2020-05-14 VITALS — DIASTOLIC BLOOD PRESSURE: 88 MMHG | SYSTOLIC BLOOD PRESSURE: 149 MMHG

## 2020-05-14 VITALS — SYSTOLIC BLOOD PRESSURE: 150 MMHG | DIASTOLIC BLOOD PRESSURE: 88 MMHG

## 2020-05-14 VITALS — DIASTOLIC BLOOD PRESSURE: 78 MMHG | SYSTOLIC BLOOD PRESSURE: 152 MMHG

## 2020-05-14 VITALS — DIASTOLIC BLOOD PRESSURE: 56 MMHG | SYSTOLIC BLOOD PRESSURE: 109 MMHG

## 2020-05-14 VITALS — SYSTOLIC BLOOD PRESSURE: 109 MMHG | DIASTOLIC BLOOD PRESSURE: 71 MMHG

## 2020-05-14 VITALS — SYSTOLIC BLOOD PRESSURE: 140 MMHG | DIASTOLIC BLOOD PRESSURE: 72 MMHG

## 2020-05-14 VITALS — DIASTOLIC BLOOD PRESSURE: 56 MMHG | SYSTOLIC BLOOD PRESSURE: 107 MMHG

## 2020-05-14 VITALS — DIASTOLIC BLOOD PRESSURE: 86 MMHG | SYSTOLIC BLOOD PRESSURE: 154 MMHG

## 2020-05-14 VITALS — SYSTOLIC BLOOD PRESSURE: 116 MMHG | DIASTOLIC BLOOD PRESSURE: 66 MMHG

## 2020-05-14 VITALS — SYSTOLIC BLOOD PRESSURE: 133 MMHG | DIASTOLIC BLOOD PRESSURE: 75 MMHG

## 2020-05-14 VITALS — DIASTOLIC BLOOD PRESSURE: 61 MMHG | SYSTOLIC BLOOD PRESSURE: 110 MMHG

## 2020-05-14 LAB
ANION GAP SERPL CALC-SCNC: 4 MMOL/L (ref 6–14)
BASOPHILS # BLD AUTO: 0 X10^3/UL (ref 0–0.2)
BASOPHILS NFR BLD: 0 % (ref 0–3)
BUN SERPL-MCNC: 24 MG/DL (ref 7–20)
CALCIUM SERPL-MCNC: 8.4 MG/DL (ref 8.5–10.1)
CHLORIDE SERPL-SCNC: 104 MMOL/L (ref 98–107)
CO2 SERPL-SCNC: 34 MMOL/L (ref 21–32)
CREAT SERPL-MCNC: 0.5 MG/DL (ref 0.6–1)
EOSINOPHIL NFR BLD: 0.2 X10^3/UL (ref 0–0.7)
EOSINOPHIL NFR BLD: 2 % (ref 0–3)
ERYTHROCYTE [DISTWIDTH] IN BLOOD BY AUTOMATED COUNT: 17.9 % (ref 11.5–14.5)
GFR SERPLBLD BASED ON 1.73 SQ M-ARVRAT: 131.1 ML/MIN
GLUCOSE SERPL-MCNC: 151 MG/DL (ref 70–99)
HCT VFR BLD CALC: 24.2 % (ref 36–47)
HGB BLD-MCNC: 7.7 G/DL (ref 12–15.5)
LYMPHOCYTES # BLD: 1.4 X10^3/UL (ref 1–4.8)
LYMPHOCYTES NFR BLD AUTO: 13 % (ref 24–48)
MAGNESIUM SERPL-MCNC: 1.9 MG/DL (ref 1.8–2.4)
MCH RBC QN AUTO: 28 PG (ref 25–35)
MCHC RBC AUTO-ENTMCNC: 32 G/DL (ref 31–37)
MCV RBC AUTO: 89 FL (ref 79–100)
MONO #: 0.6 X10^3/UL (ref 0–1.1)
MONOCYTES NFR BLD: 6 % (ref 0–9)
NEUT #: 8.4 X10^3/UL (ref 1.8–7.7)
NEUTROPHILS NFR BLD AUTO: 80 % (ref 31–73)
PHOSPHATE SERPL-MCNC: 3.9 MG/DL (ref 2.6–4.7)
PLATELET # BLD AUTO: 521 X10^3/UL (ref 140–400)
POTASSIUM SERPL-SCNC: 3.4 MMOL/L (ref 3.5–5.1)
RBC # BLD AUTO: 2.73 X10^6/UL (ref 3.5–5.4)
SODIUM SERPL-SCNC: 142 MMOL/L (ref 136–145)
WBC # BLD AUTO: 10.5 X10^3/UL (ref 4–11)

## 2020-05-14 RX ADMIN — METOCLOPRAMIDE PRN MG: 5 INJECTION, SOLUTION INTRAMUSCULAR; INTRAVENOUS at 04:25

## 2020-05-14 RX ADMIN — INSULIN LISPRO SCH UNITS: 100 INJECTION, SOLUTION INTRAVENOUS; SUBCUTANEOUS at 11:39

## 2020-05-14 RX ADMIN — PANTOPRAZOLE SODIUM SCH MG: 40 INJECTION, POWDER, FOR SOLUTION INTRAVENOUS at 08:31

## 2020-05-14 RX ADMIN — HYDROMORPHONE HYDROCHLORIDE PRN MG: 2 INJECTION INTRAMUSCULAR; INTRAVENOUS; SUBCUTANEOUS at 21:04

## 2020-05-14 RX ADMIN — DEXMEDETOMIDINE HYDROCHLORIDE PRN MLS/HR: 100 INJECTION, SOLUTION, CONCENTRATE INTRAVENOUS at 14:15

## 2020-05-14 RX ADMIN — MEROPENEM SCH MLS/HR: 1 INJECTION, POWDER, FOR SOLUTION INTRAVENOUS at 21:44

## 2020-05-14 RX ADMIN — DEXMEDETOMIDINE HYDROCHLORIDE PRN MLS/HR: 100 INJECTION, SOLUTION, CONCENTRATE INTRAVENOUS at 21:43

## 2020-05-14 RX ADMIN — MEROPENEM SCH MLS/HR: 1 INJECTION, POWDER, FOR SOLUTION INTRAVENOUS at 06:06

## 2020-05-14 RX ADMIN — PROCHLORPERAZINE EDISYLATE PRN MG: 5 INJECTION INTRAMUSCULAR; INTRAVENOUS at 06:11

## 2020-05-14 RX ADMIN — HYDROMORPHONE HYDROCHLORIDE PRN MG: 2 INJECTION INTRAMUSCULAR; INTRAVENOUS; SUBCUTANEOUS at 15:37

## 2020-05-14 RX ADMIN — INSULIN LISPRO SCH UNITS: 100 INJECTION, SOLUTION INTRAVENOUS; SUBCUTANEOUS at 06:00

## 2020-05-14 RX ADMIN — MEROPENEM SCH MLS/HR: 1 INJECTION, POWDER, FOR SOLUTION INTRAVENOUS at 14:14

## 2020-05-14 RX ADMIN — Medication PRN EACH: at 11:52

## 2020-05-14 RX ADMIN — INSULIN LISPRO SCH UNITS: 100 INJECTION, SOLUTION INTRAVENOUS; SUBCUTANEOUS at 18:02

## 2020-05-14 RX ADMIN — DEXMEDETOMIDINE HYDROCHLORIDE PRN MLS/HR: 100 INJECTION, SOLUTION, CONCENTRATE INTRAVENOUS at 06:15

## 2020-05-14 RX ADMIN — BACITRACIN SCH MLS/HR: 5000 INJECTION, POWDER, FOR SOLUTION INTRAMUSCULAR at 14:17

## 2020-05-14 RX ADMIN — DEXTROSE SCH MLS/HR: 50 INJECTION, SOLUTION INTRAVENOUS at 11:29

## 2020-05-14 RX ADMIN — BUMETANIDE SCH MG: 0.25 INJECTION INTRAMUSCULAR; INTRAVENOUS at 08:32

## 2020-05-14 NOTE — PDOC
Subjective:


Subjective:


"So-so."


Breathing better?





Objective:


Objective:


Vomited once, ongoing bilious output from NG, got Compazine overnight.


Vital Signs:





                                   Vital Signs








  Date Time  Temp Pulse Resp B/P (MAP) Pulse Ox O2 Delivery O2 Flow Rate FiO2


 


5/14/20 10:00  140 28 150/88 (108) 98 Nasal Cannula 5.0 


 


5/14/20 09:00 98.1       





 98.1       








Labs:





Laboratory Tests








Test


 5/13/20


11:15 5/13/20


17:29 5/13/20


23:35 5/14/20


05:45


 


Glucose (Fingerstick) 183 mg/dL  180 mg/dL  164 mg/dL  


 


White Blood Count    10.5 x10^3/uL 


 


Red Blood Count    2.73 x10^6/uL 


 


Hemoglobin    7.7 g/dL 


 


Hematocrit    24.2 % 


 


Mean Corpuscular Volume    89 fL 


 


Mean Corpuscular Hemoglobin    28 pg 


 


Mean Corpuscular Hemoglobin


Concent 


 


 


 32 g/dL 





 


Red Cell Distribution Width    17.9 % 


 


Platelet Count    521 x10^3/uL 


 


Neutrophils (%) (Auto)    80 % 


 


Lymphocytes (%) (Auto)    13 % 


 


Monocytes (%) (Auto)    6 % 


 


Eosinophils (%) (Auto)    2 % 


 


Basophils (%) (Auto)    0 % 


 


Neutrophils # (Auto)    8.4 x10^3/uL 


 


Lymphocytes # (Auto)    1.4 x10^3/uL 


 


Monocytes # (Auto)    0.6 x10^3/uL 


 


Eosinophils # (Auto)    0.2 x10^3/uL 


 


Basophils # (Auto)    0.0 x10^3/uL 


 


Sodium Level    142 mmol/L 


 


Potassium Level    3.4 mmol/L 


 


Chloride Level    104 mmol/L 


 


Carbon Dioxide Level    34 mmol/L 


 


Anion Gap    4 


 


Blood Urea Nitrogen    24 mg/dL 


 


Creatinine    0.5 mg/dL 


 


Estimated GFR


(Cockcroft-Gault) 


 


 


 131.1 





 


Glucose Level    151 mg/dL 


 


Calcium Level    8.4 mg/dL 


 


Phosphorus Level    3.9 mg/dL 


 


Magnesium Level    1.9 mg/dL 


 


Test


 5/14/20


06:05 


 


 





 


Glucose (Fingerstick) 143 mg/dL    











PE:





GEN: NAD


LUNGS: trach, NC


HEART: tachycardic


ABD: improved distention, soft, BS+, drains


NEURO/PSYCH: A & O 3, anxious





A/P:


Gallstone pancreatitis, MOSF





--


Continue support.





Hemodynamically unstable?:  No


Is patient in severe pain?:  No


Is NPO status required?:  Yes











RAGHAVENDRA MURCIA         May 14, 2020 10:34

## 2020-05-14 NOTE — NUR
Pharmacy TPN Dosing Note



S: JESENIA RICH is a 49 year old F Currently receiving Central Continuous TPN started 
03/18/20



B:Pertinent PMH: Necrotizing pancreatitis

Height: 5 feet, 8 inches

Weight: 90.7 kg



Current diet: NPO 



LABS:

Sodium:    142 

Potassium: 3.4 

Chloride:  104 

Calcium:   8.4 

Corrected Calcium: 10.16 

Magnesium: 1.9 

CO2:       34 

SCr:       0.5 

Glucose:   143-151 

Albumin:   1.8 

AST:       50 

ALT:       50 



TPN FORMULA:

TPN TYPE:  Central Continuous

AMINO ACIDS:         90 gm

DEXTROSE:            225 gm

LIPIDS:              30 gm

POTASSIUM CHLORIDE:  90 mEq

MAGNESIUM:           20 mEq

INSULIN:             15 units

MULTIPLE VITAMIN:    10 ml

TRACE ELEMENTS:      0.5 ml(s)



TPN PLAN:  

Replace K with KCL 20 meq IVPB x 1 dose. Increase KCL in TPN to 90 meq.

-BMP in AM.





R: Change TPN as noted above.

Will monitor electrolytes, glucose, and tolerance to TPN.



 LORENZO LESLIE RPH, 05/14/20 [id number]

## 2020-05-14 NOTE — PDOC
[facility] [facility] PROGRESS NOTE


Chief Complaint


Chief Complaint


Acute hypoxic Respiratory failure requiring mechanical ventilation (on vent 

since 3/23)


Tracheostomy


bilateral pleural effusions/pulm edema 


Sepsis


Severe Acute gallstone pancreatitis (not a surgical candidate at this time) with

necrosis


Acute kidney failure now requiring dialysis


Salpingitis


Gallstones (Calculus of gallbladder with acute cholecystitis without 

obstruction)


HTN 


Leukocytosis 


Hypoxia


Uterine fibroid


Intractable pain


Intractable nausea


Covid 19 negative. 


Acute on chronic anemia 


EEG: No seizure activityFever  - better currently - intermittent could be from 

underlying pancreatitis blood cults 5/4 - neg so far


? Ileus with vomiting


Abd distention - U/S and CT reviewed s/p 0.4 L of opaque, debris-containing 

ascites was removed 5/6


Acute pancreatitis with persistent necrosis


- 4/27 status post ROBERT drain placement + C paropsilosis. s/p additional drains 

5/8


Anemia - S/p PRBCs


Cholelithiasis with thickening of the gallbladder wall.


Leucocytosis improving


JED, hyperkalemia, Metabolic acidosis off dialysis


Acute hypoxic resp failure ,bilateral pleural effusion and atelectasis


hypocalcemia 


Prediabetes


HTN


s/p trach


ESRD on HD


Hyperglycemia





History of Present Illness


History of Present Illness


5/14/2020


Patient seen and examined in the ICU


She now has a speaking valve in her trach and is able to talk


She is extremely weak and shaky


Chart reviewed discussed with RN





5/13/2020


Patient seen and examined in the ICU


She is up in the chair with O2 via trach shield


Extremely anxious


Cannot talk but is trying to write things to me although I cannot understand 

what she is writing


Discussed with RN


Chart reviewed


Still on IV TPN


Still extremely ill








5/12/2020


Patient seen and examined in the ICU


She now has a trach shield


Pleasantly confused but sedated with Precedex


Chart reviewed


Discussed with RN


She has IV TPN


Still very critically ill





5/11/2020


Patient seen and examined


She remains on the vent but spontaneous respirations with 20 of pressure support

and 30% FiO2


He still has NG to suction


Appears extremely ill and weak and confused


Has IV TPN Precedex antibiotics


[place] is to bedside drainage


She has SCDs and ProCare boots


She is on IV meropenem and daptomycin and micafungin


Chart reviewed


Discussed with RN


Patient is still critically ill








Ms Tadeo is a 48yo F w/ PMHx HTN, prediabetes who presented to the emergency 

room with complaints of abdominal pain on 3/16/2020. Found with Lipase 06114, 

, , Bilirubin 1.4.


CT abdomen confirms pancreatic inflammation, peripancreatic fluid and 

inflammatory changes around the pancreas consistent with pancreatitis. 

Cholelithiasis and 1.4cm uterine fibroid as well as possible left salpingitis. 

Admitted for further care


GI, General surgery, ID, Pulm consulted.





3/17: PICC placed per IR. Renal US negative. Started on levophed. Repeat CT 

abdomen w/ necrosis; 3/18: Dialysis catheter per nephrology; 3/19: On BiPAP; 3

/20: BiPAP, dialysis; 3/21: Overnight Tmax 101.7 , still on BiPAP FiO2 40%, 

still on low dose Levophed gtt, TPN initiated. On dialysis


4/6: Tracheostomy; 4/12: S/p tracheostomy on vent spontaneous respirations with 

5 of pressure support 35% FiO2, rectal tube and a Chino, off pressors; 

4/14:Still on vent via trach. Removed PICC and CVC LIJ and replaced. CT 

chest/abd/pelvis with bilateral pleural effusion and ascites.


4/15: Renal function stable. Still on vent. More interactive today. Miming wish 

for food. Plan discussed for thoracentesis/paracentesis with daughters today. 

They were under impression patient was doing worse due to a miscommunication 

which has been clarified over the phone. 4.3L removed.


4/17: Febrile overnight 101.8F. More interactive, still on vent. Asking for ice 

by miming; 4/18: Afebrile overnight. TMax last 24 hours 100.6F. Hb 7.1. 

Interactive when awake. 4/22: Transfusion 1u PRBC (6U total since admit)


4/23-4/26: TPN and precedex, vent.


4/27: Tmax 101F overnight. Hb 8.2. HD cath out since 4/24. Alert. On vent SIMV 

35% FiO2. Surgery: ex-lap, no naif or pancreatic necrosectomy 2/2 profound 

inflammation.


4/28: Seen POD #1. Afebrile overnight. BUN 62. CBC WNL today.Remains on vent via

trach, TPN. Able to point today and indicate she wishes her daughters and Rolf 

to be involved in her care.


4/29: Hb 7.4, Na 151. Remains on vent via trach, TPN. off HD for now. Not 

tolerating trach shield well.


4/30: Negative US for UE DVT. More relaxed today. Has some increase in UOP. 

Tolerating vent well. She is requesting to eat and drink via writing. T max 100F

24 hours ago, but 101F at 1200. Right PICC placed. Speaking valve for half the 

day in [facility]


5/1: Na 153 today. Good UOP. Tmax 101F at 1200 on 4/30. She becomes a bit 

anxious and did not sleep much last night, wishes to try to sleep this morning


5/2: Able to speak well with speaking valve today. Na 153, K2.9, Mg 1.8, Cr 1. 

100.4F axillary temp overnight. Asking for food.


5/3: Afebrile overnight. ABG with pH 7.27/75/123. Hb 7.1. Na 153. PCA settings 

decreased


5/4: No acute events reported overnight, case discussed with nursing staff 

patient in no acute distress no complaints during my visit


5/5: Patient responding to verbal stimuli.  She is saturating well and not 

requiring ventilation support, no acute events reported overnight seems to be 

slowly improving


5/6: Patient requiring transfusion of packed red blood cells due to anemia, no 

evidence of acute bleeding, appreciate GI consultant recommendations


5/7: Patient required ventilatory support given that she desaturated, she is 

lying in bed in mild distress, case discussed with nursing staff, no evidence of

bleeding, h an h noted. 


5/8: Underwent IR procedure as follows


BRIEF OPERATIVE NOTE


Pre-Op Diagnosis


Pancreatitis with pseudocysts, suspected infection


Post-Op Diagnosis


same


Procedure Performed


CT abdominal Drains x 3


Surgeon


Hardy


Anesthesia Type:  Conscious Sedation


Findings


3 abdominal drains, 14F, with turbid pancreatic fluid and necrotic debris in 

each.


Complications


No immediate








5/9: Patient today somewhat restless and having bilious secretions from ET tube,

imaging studies ordered, discussed with consultant. Pretty poor prognosis, 

hopefully is not a fistula, poor surgical candidate. 





5/10: Imaging with no acute events, she seems more stable today compared to 

yesterday. Encouraged as much activity as possible patient at high risk for 

severe depression.





Vitals/I&O


Vitals/I&O:





                                   Vital Signs








  Date Time  Temp Pulse Resp B/P (MAP) Pulse Ox O2 Delivery O2 Flow Rate FiO2


 


5/14/20 10:57  122 26 154/86 (108) 100 Tracheal Collar 8.0 


 


5/14/20 09:00 98.1       





 98.1       














                                    I & O   


 


 5/13/20 5/13/20 5/14/20





 15:00 23:00 07:00


 


Intake Total 200 ml 1846 ml 1270.1 ml


 


Output Total 1480 ml 1365 ml 725 ml


 


Balance -1280 ml 481 ml 545.1 ml











Physical Exam


Physical Exam:


GENERAL: Up in chair very anxious extremely weak shaky probably confused but on 

Precedex


HEENT:  oral cavity dry, NGT


NECK:  Trach/vent 


LUNGS: Improved aeration has humidified O2 via trach shield


HEART:  S1, S2, regular 


ABDOMEN: Distended, hypoactive BS, tender, + drains x 3


: [place] (4/14)


EXTREMITIES: Generalized edema, no cyanosis, SCDs bilaterally 


SKIN: Warm and dry.  No generalized rash.  


CNS: Nodded some to couple questions 


PICC(4/30) clean


General:  Alert, Oriented X3, Cooperative, mild distress


Heart:  Regular rate, Normal S1, Normal S2, No murmurs, Gallops


Lungs:  Other (decrease bs)


Abdomen:  Soft, No tenderness, Other (drains in place)


Extremities:  No clubbing, No cyanosis, No edema, Normal pulses, No 

tenderness/swelling


Skin:  Other (warm, dry)





Labs


Labs:





Laboratory Tests








Test


 5/13/20


17:29 5/13/20


23:35 5/14/20


05:45 5/14/20


06:05


 


Glucose (Fingerstick)


 180 mg/dL


(70-99) 164 mg/dL


(70-99) 


 143 mg/dL


(70-99)


 


White Blood Count


 


 


 10.5 x10^3/uL


(4.0-11.0) 





 


Red Blood Count


 


 


 2.73 x10^6/uL


(3.50-5.40) 





 


Hemoglobin


 


 


 7.7 g/dL


(12.0-15.5) 





 


Hematocrit


 


 


 24.2 %


(36.0-47.0) 





 


Mean Corpuscular Volume   89 fL ()  


 


Mean Corpuscular Hemoglobin   28 pg (25-35)  


 


Mean Corpuscular Hemoglobin


Concent 


 


 32 g/dL


(31-37) 





 


Red Cell Distribution Width


 


 


 17.9 %


(11.5-14.5) 





 


Platelet Count


 


 


 521 x10^3/uL


(140-400) 





 


Neutrophils (%) (Auto)   80 % (31-73)  


 


Lymphocytes (%) (Auto)   13 % (24-48)  


 


Monocytes (%) (Auto)   6 % (0-9)  


 


Eosinophils (%) (Auto)   2 % (0-3)  


 


Basophils (%) (Auto)   0 % (0-3)  


 


Neutrophils # (Auto)


 


 


 8.4 x10^3/uL


(1.8-7.7) 





 


Lymphocytes # (Auto)


 


 


 1.4 x10^3/uL


(1.0-4.8) 





 


Monocytes # (Auto)


 


 


 0.6 x10^3/uL


(0.0-1.1) 





 


Eosinophils # (Auto)


 


 


 0.2 x10^3/uL


(0.0-0.7) 





 


Basophils # (Auto)


 


 


 0.0 x10^3/uL


(0.0-0.2) 





 


Sodium Level


 


 


 142 mmol/L


(136-145) 





 


Potassium Level


 


 


 3.4 mmol/L


(3.5-5.1) 





 


Chloride Level


 


 


 104 mmol/L


() 





 


Carbon Dioxide Level


 


 


 34 mmol/L


(21-32) 





 


Anion Gap   4 (6-14)  


 


Blood Urea Nitrogen


 


 


 24 mg/dL


(7-20) 





 


Creatinine


 


 


 0.5 mg/dL


(0.6-1.0) 





 


Estimated GFR


(Cockcroft-Gault) 


 


 131.1 


 





 


Glucose Level


 


 


 151 mg/dL


(70-99) 





 


Calcium Level


 


 


 8.4 mg/dL


(8.5-10.1) 





 


Phosphorus Level


 


 


 3.9 mg/dL


(2.6-4.7) 





 


Magnesium Level


 


 


 1.9 mg/dL


(1.8-2.4) 














Assessment and Plan


Assessmemt and Plan


Problems


Medical Problems:


(1) Acute pancreatitis


Status: Acute  





(2) Cholelithiasis


Status: Acute  





Acute hypoxic Respiratory failure requiring mechanical ventilation (on vent 

since 3/23)


Tracheostomy


bilateral pleural effusions/pulm edema 


Sepsis


Severe Acute gallstone pancreatitis (not a surgical candidate at this time) with

necrosis


Acute kidney failure now requiring dialysis


Salpingitis


Gallstones (Calculus of gallbladder with acute cholecystitis without 

obstruction)


HTN 


Leukocytosis 


Hypoxia


Uterine fibroid


Intractable pain


Intractable nausea


Covid 19 negative. 


Acute on chronic anemia 


EEG: No seizure activityFever  - better currently - intermittent could be from 

underlying pancreatitis blood cults 5/4 - neg so far


? Ileus with vomiting


Abd distention - U/S and CT reviewed s/p 0.4 L of opaque, debris-containing 

ascites was removed 5/6


Acute pancreatitis with persistent necrosis


- 4/27 status post ROBERT drain placement + C paropsilosis. s/p additional drains 

5/8


Anemia - S/p PRBCs


Cholelithiasis with thickening of the gallbladder wall.


Leucocytosis improving


JED, hyperkalemia, Metabolic acidosis off dialysis


Acute hypoxic resp failure ,bilateral pleural effusion and atelectasis


hypocalcemia 


Prediabetes


HTN


s/p trach


ESRD on HD


Hyperglycemia








Plan


ICU monitoring


Wound care


Humidified O2 via trach shield


NG suctioning


Continue IV antibiotics and micafungin


Sedation with Precedex


TPN protocol


Continue Chino to bedside drainage


Tracheostomy care


Hope to eventually move towards decannulation (we have a speaking valve for now)


Trend labs


Appreciate subspecialist input


She is critically ill


Total time 34-minute





Comment


Review of Relevant


I have reviewed the following items josy (where applicable) has been applied.


Medications:





Current Medications








 Medications


  (Trade)  Dose


 Ordered  Sig/Yee


 Route


 PRN Reason  Start Time


 Stop Time Status Last Admin


Dose Admin


 


 Potassium


 Chloride 80 meq/


 Magnesium Sulfate


 20 meq/


 Multivitamins 10


 ml/Chromium/


 Copper/Manganese/


 Seleni/Zn 1 ml/


 Insulin Human


 Regular 15 unit/


 Total Parenteral


 Nutrition/Amino


 Acids/Dextrose/


 Fat Emulsion


 Intravenous  1,920 ml @ 


 80 mls/hr  TPN  CONT


 IV


   5/13/20 22:00


 5/14/20 21:59  5/13/20 22:04














Hemodynamically unstable?:  No


Is patient in severe pain?:  No


Is NPO status required?:  Yes











HECTOR MASON III DO           May 14, 2020 11:35

## 2020-05-14 NOTE — NUR
pt rested until 0830 this am.

pt then assisted to rehab chair x1 assist. pt able to stand and balance on own feet and took 
4 steps over to chair.

pt trach care completed and trach capped. precedex increased due to heart rate in the 
140-150 range and rr in the high 20's.

pt doing sit down exercises on legs of ankle pumps and leg lifts.

## 2020-05-14 NOTE — PDOC
Infectious Disease Note


Subjective


Subjective


feeling better says, awake nods appropriately


off vent, trach o2





ROS


ROS


No nausea vomiting diarrhea





Vital Sign


Vital Signs





Vital Signs








  Date Time  Temp Pulse Resp B/P (MAP) Pulse Ox O2 Delivery O2 Flow Rate FiO2


 


5/14/20 09:00 98.1 120 24 149/88 (108) 98 Nasal Cannula 5.0 





 98.1       











Physical Exam


PHYSICAL EXAM


GENERAL: Up in chair very anxious extremely weak shaky probably confused but on 

Precedex


HEENT:  oral cavity dry, NGT


NECK:  Trach/vent 


LUNGS: Improved aeration has humidified O2 via trach shield


HEART:  S1, S2, regular 


ABDOMEN: Distended, hypoactive BS, tender, + drains x 3


: [place] (4/14)


EXTREMITIES: Generalized edema, no cyanosis, SCDs bilaterally 


SKIN: Warm and dry.  No generalized rash.  


CNS: Nodded some to couple questions 


PICC(4/30) clean





Labs


Lab





Laboratory Tests








Test


 5/13/20


11:15 5/13/20


17:29 5/13/20


23:35 5/14/20


05:45


 


Glucose (Fingerstick)


 183 mg/dL


(70-99) 180 mg/dL


(70-99) 164 mg/dL


(70-99) 





 


White Blood Count


 


 


 


 10.5 x10^3/uL


(4.0-11.0)


 


Red Blood Count


 


 


 


 2.73 x10^6/uL


(3.50-5.40)


 


Hemoglobin


 


 


 


 7.7 g/dL


(12.0-15.5)


 


Hematocrit


 


 


 


 24.2 %


(36.0-47.0)


 


Mean Corpuscular Volume    89 fL () 


 


Mean Corpuscular Hemoglobin    28 pg (25-35) 


 


Mean Corpuscular Hemoglobin


Concent 


 


 


 32 g/dL


(31-37)


 


Red Cell Distribution Width


 


 


 


 17.9 %


(11.5-14.5)


 


Platelet Count


 


 


 


 521 x10^3/uL


(140-400)


 


Neutrophils (%) (Auto)    80 % (31-73) 


 


Lymphocytes (%) (Auto)    13 % (24-48) 


 


Monocytes (%) (Auto)    6 % (0-9) 


 


Eosinophils (%) (Auto)    2 % (0-3) 


 


Basophils (%) (Auto)    0 % (0-3) 


 


Neutrophils # (Auto)


 


 


 


 8.4 x10^3/uL


(1.8-7.7)


 


Lymphocytes # (Auto)


 


 


 


 1.4 x10^3/uL


(1.0-4.8)


 


Monocytes # (Auto)


 


 


 


 0.6 x10^3/uL


(0.0-1.1)


 


Eosinophils # (Auto)


 


 


 


 0.2 x10^3/uL


(0.0-0.7)


 


Basophils # (Auto)


 


 


 


 0.0 x10^3/uL


(0.0-0.2)


 


Sodium Level


 


 


 


 142 mmol/L


(136-145)


 


Potassium Level


 


 


 


 3.4 mmol/L


(3.5-5.1)


 


Chloride Level


 


 


 


 104 mmol/L


()


 


Carbon Dioxide Level


 


 


 


 34 mmol/L


(21-32)


 


Anion Gap    4 (6-14) 


 


Blood Urea Nitrogen


 


 


 


 24 mg/dL


(7-20)


 


Creatinine


 


 


 


 0.5 mg/dL


(0.6-1.0)


 


Estimated GFR


(Cockcroft-Gault) 


 


 


 131.1 





 


Glucose Level


 


 


 


 151 mg/dL


(70-99)


 


Calcium Level


 


 


 


 8.4 mg/dL


(8.5-10.1)


 


Phosphorus Level


 


 


 


 3.9 mg/dL


(2.6-4.7)


 


Magnesium Level


 


 


 


 1.9 mg/dL


(1.8-2.4)


 


Test


 5/14/20


06:05 


 


 





 


Glucose (Fingerstick)


 143 mg/dL


(70-99) 


 


 











Micro








Objective


Assessment


Fever  - better currently - 


Abd distention - U/S and CT reviewed s/p 0.4 L of opaque, debris-containing 

ascites was removed 5/6


Acute pancreatitis with persistent necrosis


  - 4/27 status post ROBERT drain placement + C paropsilosis. s/p additional drains 

5/8


Anemia - S/p PRBCs


Cholelithiasis with thickening of the gallbladder wall.


Leucocytosis improving


JED, hyperkalemia, Metabolic acidosis off dialysis


Acute hypoxic resp failure ,bilateral pleural effusion and atelectasis


hypocalcemia 


Prediabetes


HTN


s/p trach





Plan


Plan of Care


, merrem 4/8 -


Continue micafungin


f/u cultures 


Maintain aspiration precaution


Supportive care











LINN FRANZ MD               May 14, 2020 09:58

## 2020-05-14 NOTE — NUR
SS following up with discharge planning. SS reviewed pt chart and discussed with RN, Gladys. 
Pt has trach and is currently requiring oxygen. Pt remains NPO and remains on TPN. Pt has 
four ROBERT drains and continues IV antibiotics. PT/OT recommended skilled nursing unit. Pt is 
self pay pt. [employer] completing disability application. SS will continue to follow for 
discharge planning.

## 2020-05-14 NOTE — PDOC
PULMONARY PROGRESS NOTES


Subjective


Patient intubated on 3/23 , s/p trach 4/6, awake alert follows commands


placed on cap trach


s/p left tap 5/12 , 3litres removed


Vitals





Vital Signs








  Date Time  Temp Pulse Resp B/P (MAP) Pulse Ox O2 Delivery O2 Flow Rate FiO2


 


5/14/20 09:00 98.1 120 24 149/88 (108) 98 Nasal Cannula 5.0 





 98.1       








ROS:  No Chest Pain, No Abdominal Pain, No Increase Cough


General:  Alert, No acute distress


Lungs:  Other (decrease bs)


Cardiovascular:  S1, S2


Abdomen:  Soft, Non-tender, Other


Neuro Exam:  Alert


Extremities:  Other (+3 generalized edema )


Skin:  Warm, Dry


Labs





Laboratory Tests








Test


 5/12/20


11:31 5/12/20


18:22 5/13/20


00:23 5/13/20


05:06


 


Glucose (Fingerstick)


 179 mg/dL


(70-99) 142 mg/dL


(70-99) 169 mg/dL


(70-99) 





 


White Blood Count


 


 


 


 11.8 x10^3/uL


(4.0-11.0)


 


Red Blood Count


 


 


 


 2.72 x10^6/uL


(3.50-5.40)


 


Hemoglobin


 


 


 


 7.7 g/dL


(12.0-15.5)


 


Hematocrit


 


 


 


 24.0 %


(36.0-47.0)


 


Mean Corpuscular Volume    88 fL () 


 


Mean Corpuscular Hemoglobin    28 pg (25-35) 


 


Mean Corpuscular Hemoglobin


Concent 


 


 


 32 g/dL


(31-37)


 


Red Cell Distribution Width


 


 


 


 17.9 %


(11.5-14.5)


 


Platelet Count


 


 


 


 512 x10^3/uL


(140-400)


 


Neutrophils (%) (Auto)    73 % (31-73) 


 


Lymphocytes (%) (Auto)    20 % (24-48) 


 


Monocytes (%) (Auto)    5 % (0-9) 


 


Eosinophils (%) (Auto)    1 % (0-3) 


 


Basophils (%) (Auto)    0 % (0-3) 


 


Neutrophils # (Auto)


 


 


 


 8.6 x10^3/uL


(1.8-7.7)


 


Lymphocytes # (Auto)


 


 


 


 2.4 x10^3/uL


(1.0-4.8)


 


Monocytes # (Auto)


 


 


 


 0.6 x10^3/uL


(0.0-1.1)


 


Eosinophils # (Auto)


 


 


 


 0.1 x10^3/uL


(0.0-0.7)


 


Basophils # (Auto)


 


 


 


 0.0 x10^3/uL


(0.0-0.2)


 


Test


 5/13/20


05:15 5/13/20


11:15 5/13/20


17:29 5/13/20


23:35


 


Glucose (Fingerstick)


 91 mg/dL


(70-99) 183 mg/dL


(70-99) 180 mg/dL


(70-99) 164 mg/dL


(70-99)


 


Test


 5/14/20


05:45 5/14/20


06:05 


 





 


White Blood Count


 10.5 x10^3/uL


(4.0-11.0) 


 


 





 


Red Blood Count


 2.73 x10^6/uL


(3.50-5.40) 


 


 





 


Hemoglobin


 7.7 g/dL


(12.0-15.5) 


 


 





 


Hematocrit


 24.2 %


(36.0-47.0) 


 


 





 


Mean Corpuscular Volume 89 fL ()    


 


Mean Corpuscular Hemoglobin 28 pg (25-35)    


 


Mean Corpuscular Hemoglobin


Concent 32 g/dL


(31-37) 


 


 





 


Red Cell Distribution Width


 17.9 %


(11.5-14.5) 


 


 





 


Platelet Count


 521 x10^3/uL


(140-400) 


 


 





 


Neutrophils (%) (Auto) 80 % (31-73)    


 


Lymphocytes (%) (Auto) 13 % (24-48)    


 


Monocytes (%) (Auto) 6 % (0-9)    


 


Eosinophils (%) (Auto) 2 % (0-3)    


 


Basophils (%) (Auto) 0 % (0-3)    


 


Neutrophils # (Auto)


 8.4 x10^3/uL


(1.8-7.7) 


 


 





 


Lymphocytes # (Auto)


 1.4 x10^3/uL


(1.0-4.8) 


 


 





 


Monocytes # (Auto)


 0.6 x10^3/uL


(0.0-1.1) 


 


 





 


Eosinophils # (Auto)


 0.2 x10^3/uL


(0.0-0.7) 


 


 





 


Basophils # (Auto)


 0.0 x10^3/uL


(0.0-0.2) 


 


 





 


Sodium Level


 142 mmol/L


(136-145) 


 


 





 


Potassium Level


 3.4 mmol/L


(3.5-5.1) 


 


 





 


Chloride Level


 104 mmol/L


() 


 


 





 


Carbon Dioxide Level


 34 mmol/L


(21-32) 


 


 





 


Anion Gap 4 (6-14)    


 


Blood Urea Nitrogen


 24 mg/dL


(7-20) 


 


 





 


Creatinine


 0.5 mg/dL


(0.6-1.0) 


 


 





 


Estimated GFR


(Cockcroft-Gault) 131.1 


 


 


 





 


Glucose Level


 151 mg/dL


(70-99) 


 


 





 


Calcium Level


 8.4 mg/dL


(8.5-10.1) 


 


 





 


Phosphorus Level


 3.9 mg/dL


(2.6-4.7) 


 


 





 


Magnesium Level


 1.9 mg/dL


(1.8-2.4) 


 


 





 


Glucose (Fingerstick)


 


 143 mg/dL


(70-99) 


 











Laboratory Tests








Test


 5/13/20


11:15 5/13/20


17:29 5/13/20


23:35 5/14/20


05:45


 


Glucose (Fingerstick)


 183 mg/dL


(70-99) 180 mg/dL


(70-99) 164 mg/dL


(70-99) 





 


White Blood Count


 


 


 


 10.5 x10^3/uL


(4.0-11.0)


 


Red Blood Count


 


 


 


 2.73 x10^6/uL


(3.50-5.40)


 


Hemoglobin


 


 


 


 7.7 g/dL


(12.0-15.5)


 


Hematocrit


 


 


 


 24.2 %


(36.0-47.0)


 


Mean Corpuscular Volume    89 fL () 


 


Mean Corpuscular Hemoglobin    28 pg (25-35) 


 


Mean Corpuscular Hemoglobin


Concent 


 


 


 32 g/dL


(31-37)


 


Red Cell Distribution Width


 


 


 


 17.9 %


(11.5-14.5)


 


Platelet Count


 


 


 


 521 x10^3/uL


(140-400)


 


Neutrophils (%) (Auto)    80 % (31-73) 


 


Lymphocytes (%) (Auto)    13 % (24-48) 


 


Monocytes (%) (Auto)    6 % (0-9) 


 


Eosinophils (%) (Auto)    2 % (0-3) 


 


Basophils (%) (Auto)    0 % (0-3) 


 


Neutrophils # (Auto)


 


 


 


 8.4 x10^3/uL


(1.8-7.7)


 


Lymphocytes # (Auto)


 


 


 


 1.4 x10^3/uL


(1.0-4.8)


 


Monocytes # (Auto)


 


 


 


 0.6 x10^3/uL


(0.0-1.1)


 


Eosinophils # (Auto)


 


 


 


 0.2 x10^3/uL


(0.0-0.7)


 


Basophils # (Auto)


 


 


 


 0.0 x10^3/uL


(0.0-0.2)


 


Sodium Level


 


 


 


 142 mmol/L


(136-145)


 


Potassium Level


 


 


 


 3.4 mmol/L


(3.5-5.1)


 


Chloride Level


 


 


 


 104 mmol/L


()


 


Carbon Dioxide Level


 


 


 


 34 mmol/L


(21-32)


 


Anion Gap    4 (6-14) 


 


Blood Urea Nitrogen


 


 


 


 24 mg/dL


(7-20)


 


Creatinine


 


 


 


 0.5 mg/dL


(0.6-1.0)


 


Estimated GFR


(Cockcroft-Gault) 


 


 


 131.1 





 


Glucose Level


 


 


 


 151 mg/dL


(70-99)


 


Calcium Level


 


 


 


 8.4 mg/dL


(8.5-10.1)


 


Phosphorus Level


 


 


 


 3.9 mg/dL


(2.6-4.7)


 


Magnesium Level


 


 


 


 1.9 mg/dL


(1.8-2.4)


 


Test


 5/14/20


06:05 


 


 





 


Glucose (Fingerstick)


 143 mg/dL


(70-99) 


 


 











Medications





Active Scripts








 Medications  Dose


 Route/Sig


 Max Daily Dose Days Date Category


 


 Bisoprolol


 Fumarate 5 Mg


 Tablet  10 Mg


 PO DAILY


   3/16/20 Reported








Comments


CXR  5/12/2020





persistent moderate effusions





Impression


.


IMPRESSION:


1.  Acute hypoxemic respiratory failure secondary to ARDS status post trach,


2.  Gallstone pancreatitis


3.  Severe metabolic acidosis.stable


4.  Acute kidney injury-stable, Off HD-- continue to improve 


5.  Acute gallstone pancreatitis.


6.  Hypoalbuminemia.


7.  Moderate persistent effusions, s/p left thora  5/12


8.  Fever-  Per ID, per surgery--resolved 


9.  Chronic anemia


10. Covid 19 testing negative


11. Moderate to large ascites-S/P paracentisis


12.S/P paracentisis with 4 liters removed on 4/15/20


13. S/P IR drain placement on 5/8/2020





Plan


.


d/w IR , s/p  thoracentesis, 5/12, 3 litres removed


TS all day wit hprn capping


Follow surgery recs-- S/P 3 drain placed in IR on 5/8/2020


Follow ID recs for ABX


Follow nephrology recs 


Continue TPN 


DVT/GI PPX: heparin SQ/ protonix 


D/W RN and RT





CODE:FULL











SHARYN SOLORZANO MD                 May 14, 2020 10:25

## 2020-05-14 NOTE — PDOC
SURGICAL PROGRESS NOTE


Subjective


 Pt with c/o abd pain


Vital Signs





Vital Signs








  Date Time  Temp Pulse Resp B/P (MAP) Pulse Ox O2 Delivery O2 Flow Rate FiO2


 


5/14/20 10:57  122 26 154/86 (108) 100 Tracheal Collar 8.0 


 


5/14/20 09:00 98.1       





 98.1       








I&O











Intake and Output 


 


 5/14/20





 07:00


 


Intake Total 3316.1 ml


 


Output Total 3570 ml


 


Balance -253.9 ml


 


 


 


IV Total 3228.1 ml


 


Other 88 ml


 


Output Urine Total 2960 ml


 


Gastric Drainage Total 200 ml


 


Drainage Total 410 ml


 


# Bowel Movements 1








PATIENT HAS A VILLASENOR:  Yes (accurate i and os)


General:  Alert, Oriented X3, Cooperative, mild distress


Abdomen:  Soft, No tenderness, Other (drains in place)


Labs





Laboratory Tests








Test


 5/12/20


18:22 5/13/20


00:23 5/13/20


05:06 5/13/20


05:15


 


Glucose (Fingerstick)


 142 mg/dL


(70-99) 169 mg/dL


(70-99) 


 91 mg/dL


(70-99)


 


White Blood Count


 


 


 11.8 x10^3/uL


(4.0-11.0) 





 


Red Blood Count


 


 


 2.72 x10^6/uL


(3.50-5.40) 





 


Hemoglobin


 


 


 7.7 g/dL


(12.0-15.5) 





 


Hematocrit


 


 


 24.0 %


(36.0-47.0) 





 


Mean Corpuscular Volume   88 fL ()  


 


Mean Corpuscular Hemoglobin   28 pg (25-35)  


 


Mean Corpuscular Hemoglobin


Concent 


 


 32 g/dL


(31-37) 





 


Red Cell Distribution Width


 


 


 17.9 %


(11.5-14.5) 





 


Platelet Count


 


 


 512 x10^3/uL


(140-400) 





 


Neutrophils (%) (Auto)   73 % (31-73)  


 


Lymphocytes (%) (Auto)   20 % (24-48)  


 


Monocytes (%) (Auto)   5 % (0-9)  


 


Eosinophils (%) (Auto)   1 % (0-3)  


 


Basophils (%) (Auto)   0 % (0-3)  


 


Neutrophils # (Auto)


 


 


 8.6 x10^3/uL


(1.8-7.7) 





 


Lymphocytes # (Auto)


 


 


 2.4 x10^3/uL


(1.0-4.8) 





 


Monocytes # (Auto)


 


 


 0.6 x10^3/uL


(0.0-1.1) 





 


Eosinophils # (Auto)


 


 


 0.1 x10^3/uL


(0.0-0.7) 





 


Basophils # (Auto)


 


 


 0.0 x10^3/uL


(0.0-0.2) 





 


Test


 5/13/20


11:15 5/13/20


17:29 5/13/20


23:35 5/14/20


05:45


 


Glucose (Fingerstick)


 183 mg/dL


(70-99) 180 mg/dL


(70-99) 164 mg/dL


(70-99) 





 


White Blood Count


 


 


 


 10.5 x10^3/uL


(4.0-11.0)


 


Red Blood Count


 


 


 


 2.73 x10^6/uL


(3.50-5.40)


 


Hemoglobin


 


 


 


 7.7 g/dL


(12.0-15.5)


 


Hematocrit


 


 


 


 24.2 %


(36.0-47.0)


 


Mean Corpuscular Volume    89 fL () 


 


Mean Corpuscular Hemoglobin    28 pg (25-35) 


 


Mean Corpuscular Hemoglobin


Concent 


 


 


 32 g/dL


(31-37)


 


Red Cell Distribution Width


 


 


 


 17.9 %


(11.5-14.5)


 


Platelet Count


 


 


 


 521 x10^3/uL


(140-400)


 


Neutrophils (%) (Auto)    80 % (31-73) 


 


Lymphocytes (%) (Auto)    13 % (24-48) 


 


Monocytes (%) (Auto)    6 % (0-9) 


 


Eosinophils (%) (Auto)    2 % (0-3) 


 


Basophils (%) (Auto)    0 % (0-3) 


 


Neutrophils # (Auto)


 


 


 


 8.4 x10^3/uL


(1.8-7.7)


 


Lymphocytes # (Auto)


 


 


 


 1.4 x10^3/uL


(1.0-4.8)


 


Monocytes # (Auto)


 


 


 


 0.6 x10^3/uL


(0.0-1.1)


 


Eosinophils # (Auto)


 


 


 


 0.2 x10^3/uL


(0.0-0.7)


 


Basophils # (Auto)


 


 


 


 0.0 x10^3/uL


(0.0-0.2)


 


Sodium Level


 


 


 


 142 mmol/L


(136-145)


 


Potassium Level


 


 


 


 3.4 mmol/L


(3.5-5.1)


 


Chloride Level


 


 


 


 104 mmol/L


()


 


Carbon Dioxide Level


 


 


 


 34 mmol/L


(21-32)


 


Anion Gap    4 (6-14) 


 


Blood Urea Nitrogen


 


 


 


 24 mg/dL


(7-20)


 


Creatinine


 


 


 


 0.5 mg/dL


(0.6-1.0)


 


Estimated GFR


(Cockcroft-Gault) 


 


 


 131.1 





 


Glucose Level


 


 


 


 151 mg/dL


(70-99)


 


Calcium Level


 


 


 


 8.4 mg/dL


(8.5-10.1)


 


Phosphorus Level


 


 


 


 3.9 mg/dL


(2.6-4.7)


 


Magnesium Level


 


 


 


 1.9 mg/dL


(1.8-2.4)


 


Test


 5/14/20


06:05 5/14/20


11:37 


 





 


Glucose (Fingerstick)


 143 mg/dL


(70-99) 200 mg/dL


(70-99) 


 











Laboratory Tests








Test


 5/13/20


17:29 5/13/20


23:35 5/14/20


05:45 5/14/20


06:05


 


Glucose (Fingerstick)


 180 mg/dL


(70-99) 164 mg/dL


(70-99) 


 143 mg/dL


(70-99)


 


White Blood Count


 


 


 10.5 x10^3/uL


(4.0-11.0) 





 


Red Blood Count


 


 


 2.73 x10^6/uL


(3.50-5.40) 





 


Hemoglobin


 


 


 7.7 g/dL


(12.0-15.5) 





 


Hematocrit


 


 


 24.2 %


(36.0-47.0) 





 


Mean Corpuscular Volume   89 fL ()  


 


Mean Corpuscular Hemoglobin   28 pg (25-35)  


 


Mean Corpuscular Hemoglobin


Concent 


 


 32 g/dL


(31-37) 





 


Red Cell Distribution Width


 


 


 17.9 %


(11.5-14.5) 





 


Platelet Count


 


 


 521 x10^3/uL


(140-400) 





 


Neutrophils (%) (Auto)   80 % (31-73)  


 


Lymphocytes (%) (Auto)   13 % (24-48)  


 


Monocytes (%) (Auto)   6 % (0-9)  


 


Eosinophils (%) (Auto)   2 % (0-3)  


 


Basophils (%) (Auto)   0 % (0-3)  


 


Neutrophils # (Auto)


 


 


 8.4 x10^3/uL


(1.8-7.7) 





 


Lymphocytes # (Auto)


 


 


 1.4 x10^3/uL


(1.0-4.8) 





 


Monocytes # (Auto)


 


 


 0.6 x10^3/uL


(0.0-1.1) 





 


Eosinophils # (Auto)


 


 


 0.2 x10^3/uL


(0.0-0.7) 





 


Basophils # (Auto)


 


 


 0.0 x10^3/uL


(0.0-0.2) 





 


Sodium Level


 


 


 142 mmol/L


(136-145) 





 


Potassium Level


 


 


 3.4 mmol/L


(3.5-5.1) 





 


Chloride Level


 


 


 104 mmol/L


() 





 


Carbon Dioxide Level


 


 


 34 mmol/L


(21-32) 





 


Anion Gap   4 (6-14)  


 


Blood Urea Nitrogen


 


 


 24 mg/dL


(7-20) 





 


Creatinine


 


 


 0.5 mg/dL


(0.6-1.0) 





 


Estimated GFR


(Cockcroft-Gault) 


 


 131.1 


 





 


Glucose Level


 


 


 151 mg/dL


(70-99) 





 


Calcium Level


 


 


 8.4 mg/dL


(8.5-10.1) 





 


Phosphorus Level


 


 


 3.9 mg/dL


(2.6-4.7) 





 


Magnesium Level


 


 


 1.9 mg/dL


(1.8-2.4) 





 


Test


 5/14/20


11:37 


 


 





 


Glucose (Fingerstick)


 200 mg/dL


(70-99) 


 


 











Problem List


Problems


Medical Problems:


(1) Acute pancreatitis


Status: Acute  





(2) Cholelithiasis


Status: Acute  








Assessment/Plan


s/p lap exploration


cont supportive care.











GAMAL ZHOU MD             May 14, 2020 12:07

## 2020-05-15 VITALS — SYSTOLIC BLOOD PRESSURE: 128 MMHG | DIASTOLIC BLOOD PRESSURE: 79 MMHG

## 2020-05-15 VITALS — DIASTOLIC BLOOD PRESSURE: 75 MMHG | SYSTOLIC BLOOD PRESSURE: 123 MMHG

## 2020-05-15 VITALS — DIASTOLIC BLOOD PRESSURE: 51 MMHG | SYSTOLIC BLOOD PRESSURE: 73 MMHG

## 2020-05-15 VITALS — DIASTOLIC BLOOD PRESSURE: 87 MMHG | SYSTOLIC BLOOD PRESSURE: 184 MMHG

## 2020-05-15 VITALS — SYSTOLIC BLOOD PRESSURE: 109 MMHG | DIASTOLIC BLOOD PRESSURE: 69 MMHG

## 2020-05-15 VITALS — SYSTOLIC BLOOD PRESSURE: 125 MMHG | DIASTOLIC BLOOD PRESSURE: 67 MMHG

## 2020-05-15 VITALS — SYSTOLIC BLOOD PRESSURE: 146 MMHG | DIASTOLIC BLOOD PRESSURE: 82 MMHG

## 2020-05-15 VITALS — DIASTOLIC BLOOD PRESSURE: 81 MMHG | SYSTOLIC BLOOD PRESSURE: 164 MMHG

## 2020-05-15 VITALS — SYSTOLIC BLOOD PRESSURE: 84 MMHG | DIASTOLIC BLOOD PRESSURE: 51 MMHG

## 2020-05-15 VITALS — DIASTOLIC BLOOD PRESSURE: 74 MMHG | SYSTOLIC BLOOD PRESSURE: 165 MMHG

## 2020-05-15 VITALS — SYSTOLIC BLOOD PRESSURE: 97 MMHG | DIASTOLIC BLOOD PRESSURE: 62 MMHG

## 2020-05-15 VITALS — DIASTOLIC BLOOD PRESSURE: 63 MMHG | SYSTOLIC BLOOD PRESSURE: 118 MMHG

## 2020-05-15 VITALS — SYSTOLIC BLOOD PRESSURE: 108 MMHG | DIASTOLIC BLOOD PRESSURE: 56 MMHG

## 2020-05-15 VITALS — SYSTOLIC BLOOD PRESSURE: 101 MMHG | DIASTOLIC BLOOD PRESSURE: 61 MMHG

## 2020-05-15 VITALS — SYSTOLIC BLOOD PRESSURE: 103 MMHG | DIASTOLIC BLOOD PRESSURE: 57 MMHG

## 2020-05-15 VITALS — DIASTOLIC BLOOD PRESSURE: 62 MMHG | SYSTOLIC BLOOD PRESSURE: 127 MMHG

## 2020-05-15 VITALS — SYSTOLIC BLOOD PRESSURE: 126 MMHG | DIASTOLIC BLOOD PRESSURE: 74 MMHG

## 2020-05-15 VITALS — DIASTOLIC BLOOD PRESSURE: 69 MMHG | SYSTOLIC BLOOD PRESSURE: 123 MMHG

## 2020-05-15 VITALS — SYSTOLIC BLOOD PRESSURE: 147 MMHG | DIASTOLIC BLOOD PRESSURE: 78 MMHG

## 2020-05-15 VITALS — DIASTOLIC BLOOD PRESSURE: 100 MMHG | SYSTOLIC BLOOD PRESSURE: 161 MMHG

## 2020-05-15 VITALS — SYSTOLIC BLOOD PRESSURE: 111 MMHG | DIASTOLIC BLOOD PRESSURE: 63 MMHG

## 2020-05-15 VITALS — SYSTOLIC BLOOD PRESSURE: 138 MMHG | DIASTOLIC BLOOD PRESSURE: 85 MMHG

## 2020-05-15 VITALS — SYSTOLIC BLOOD PRESSURE: 93 MMHG | DIASTOLIC BLOOD PRESSURE: 52 MMHG

## 2020-05-15 VITALS — SYSTOLIC BLOOD PRESSURE: 140 MMHG | DIASTOLIC BLOOD PRESSURE: 82 MMHG

## 2020-05-15 LAB
ANION GAP SERPL CALC-SCNC: 2 MMOL/L (ref 6–14)
BASOPHILS # BLD AUTO: 0 X10^3/UL (ref 0–0.2)
BASOPHILS NFR BLD: 0 % (ref 0–3)
BUN SERPL-MCNC: 25 MG/DL (ref 7–20)
CALCIUM SERPL-MCNC: 8.5 MG/DL (ref 8.5–10.1)
CHLORIDE SERPL-SCNC: 103 MMOL/L (ref 98–107)
CO2 SERPL-SCNC: 38 MMOL/L (ref 21–32)
CREAT SERPL-MCNC: 0.6 MG/DL (ref 0.6–1)
EOSINOPHIL NFR BLD: 0.2 X10^3/UL (ref 0–0.7)
EOSINOPHIL NFR BLD: 2 % (ref 0–3)
ERYTHROCYTE [DISTWIDTH] IN BLOOD BY AUTOMATED COUNT: 17.9 % (ref 11.5–14.5)
GFR SERPLBLD BASED ON 1.73 SQ M-ARVRAT: 106.3 ML/MIN
GLUCOSE SERPL-MCNC: 115 MG/DL (ref 70–99)
HCT VFR BLD CALC: 22.8 % (ref 36–47)
HGB BLD-MCNC: 7.2 G/DL (ref 12–15.5)
LYMPHOCYTES # BLD: 1.2 X10^3/UL (ref 1–4.8)
LYMPHOCYTES NFR BLD AUTO: 13 % (ref 24–48)
MCH RBC QN AUTO: 28 PG (ref 25–35)
MCHC RBC AUTO-ENTMCNC: 32 G/DL (ref 31–37)
MCV RBC AUTO: 89 FL (ref 79–100)
MONO #: 0.5 X10^3/UL (ref 0–1.1)
MONOCYTES NFR BLD: 6 % (ref 0–9)
NEUT #: 7.1 X10^3/UL (ref 1.8–7.7)
NEUTROPHILS NFR BLD AUTO: 79 % (ref 31–73)
PLATELET # BLD AUTO: 489 X10^3/UL (ref 140–400)
POTASSIUM SERPL-SCNC: 4.1 MMOL/L (ref 3.5–5.1)
RBC # BLD AUTO: 2.56 X10^6/UL (ref 3.5–5.4)
SODIUM SERPL-SCNC: 143 MMOL/L (ref 136–145)
WBC # BLD AUTO: 9 X10^3/UL (ref 4–11)

## 2020-05-15 RX ADMIN — DEXMEDETOMIDINE HYDROCHLORIDE PRN MLS/HR: 100 INJECTION, SOLUTION, CONCENTRATE INTRAVENOUS at 09:39

## 2020-05-15 RX ADMIN — INSULIN LISPRO SCH UNITS: 100 INJECTION, SOLUTION INTRAVENOUS; SUBCUTANEOUS at 00:52

## 2020-05-15 RX ADMIN — MEROPENEM SCH MLS/HR: 1 INJECTION, POWDER, FOR SOLUTION INTRAVENOUS at 22:45

## 2020-05-15 RX ADMIN — PANTOPRAZOLE SODIUM SCH MG: 40 INJECTION, POWDER, FOR SOLUTION INTRAVENOUS at 08:03

## 2020-05-15 RX ADMIN — BUMETANIDE SCH MG: 0.25 INJECTION INTRAMUSCULAR; INTRAVENOUS at 09:43

## 2020-05-15 RX ADMIN — HYDROMORPHONE HYDROCHLORIDE PRN MG: 2 INJECTION INTRAMUSCULAR; INTRAVENOUS; SUBCUTANEOUS at 21:04

## 2020-05-15 RX ADMIN — DEXTROSE SCH MLS/HR: 50 INJECTION, SOLUTION INTRAVENOUS at 11:08

## 2020-05-15 RX ADMIN — HYDROMORPHONE HYDROCHLORIDE PRN MG: 2 INJECTION INTRAMUSCULAR; INTRAVENOUS; SUBCUTANEOUS at 09:36

## 2020-05-15 RX ADMIN — DEXMEDETOMIDINE HYDROCHLORIDE PRN MLS/HR: 100 INJECTION, SOLUTION, CONCENTRATE INTRAVENOUS at 21:03

## 2020-05-15 RX ADMIN — BACITRACIN SCH MLS/HR: 5000 INJECTION, POWDER, FOR SOLUTION INTRAMUSCULAR at 13:37

## 2020-05-15 RX ADMIN — INSULIN LISPRO SCH UNITS: 100 INJECTION, SOLUTION INTRAVENOUS; SUBCUTANEOUS at 18:00

## 2020-05-15 RX ADMIN — HYDROMORPHONE HYDROCHLORIDE PRN MG: 2 INJECTION INTRAMUSCULAR; INTRAVENOUS; SUBCUTANEOUS at 15:12

## 2020-05-15 RX ADMIN — MEROPENEM SCH MLS/HR: 1 INJECTION, POWDER, FOR SOLUTION INTRAVENOUS at 06:17

## 2020-05-15 RX ADMIN — DEXMEDETOMIDINE HYDROCHLORIDE PRN MLS/HR: 100 INJECTION, SOLUTION, CONCENTRATE INTRAVENOUS at 14:31

## 2020-05-15 RX ADMIN — Medication PRN EACH: at 12:34

## 2020-05-15 RX ADMIN — MEROPENEM SCH MLS/HR: 1 INJECTION, POWDER, FOR SOLUTION INTRAVENOUS at 14:31

## 2020-05-15 RX ADMIN — DEXMEDETOMIDINE HYDROCHLORIDE PRN MLS/HR: 100 INJECTION, SOLUTION, CONCENTRATE INTRAVENOUS at 04:22

## 2020-05-15 RX ADMIN — INSULIN LISPRO SCH UNITS: 100 INJECTION, SOLUTION INTRAVENOUS; SUBCUTANEOUS at 12:28

## 2020-05-15 RX ADMIN — INSULIN LISPRO SCH UNITS: 100 INJECTION, SOLUTION INTRAVENOUS; SUBCUTANEOUS at 06:00

## 2020-05-15 RX ADMIN — HYDROMORPHONE HYDROCHLORIDE PRN MG: 2 INJECTION INTRAMUSCULAR; INTRAVENOUS; SUBCUTANEOUS at 04:02

## 2020-05-15 NOTE — PDOC
SURGICAL PROGRESS NOTE


Subjective


Pt resting comfortably, santosh trach valve


Vital Signs





Vital Signs








  Date Time  Temp Pulse Resp B/P (MAP) Pulse Ox O2 Delivery O2 Flow Rate FiO2


 


5/15/20 06:00  78 14 73/51 (58) 100 Nasal Cannula 5.0 


 


5/15/20 04:00 98.3       





 98.3       








I&O











Intake and Output 


 


 5/15/20





 07:00


 


Intake Total 1434 ml


 


Output Total 3305 ml


 


Balance -1871 ml


 


 


 


Intake Oral 0 ml


 


IV Total 1434 ml


 


Output Urine Total 2305 ml


 


Gastric Drainage Total 600 ml


 


Drainage Total 400 ml








General:  No acute distress


Labs





Laboratory Tests








Test


 5/13/20


11:15 5/13/20


17:29 5/13/20


23:35 5/14/20


05:45


 


Glucose (Fingerstick)


 183 mg/dL


(70-99) 180 mg/dL


(70-99) 164 mg/dL


(70-99) 





 


White Blood Count


 


 


 


 10.5 x10^3/uL


(4.0-11.0)


 


Red Blood Count


 


 


 


 2.73 x10^6/uL


(3.50-5.40)


 


Hemoglobin


 


 


 


 7.7 g/dL


(12.0-15.5)


 


Hematocrit


 


 


 


 24.2 %


(36.0-47.0)


 


Mean Corpuscular Volume    89 fL () 


 


Mean Corpuscular Hemoglobin    28 pg (25-35) 


 


Mean Corpuscular Hemoglobin


Concent 


 


 


 32 g/dL


(31-37)


 


Red Cell Distribution Width


 


 


 


 17.9 %


(11.5-14.5)


 


Platelet Count


 


 


 


 521 x10^3/uL


(140-400)


 


Neutrophils (%) (Auto)    80 % (31-73) 


 


Lymphocytes (%) (Auto)    13 % (24-48) 


 


Monocytes (%) (Auto)    6 % (0-9) 


 


Eosinophils (%) (Auto)    2 % (0-3) 


 


Basophils (%) (Auto)    0 % (0-3) 


 


Neutrophils # (Auto)


 


 


 


 8.4 x10^3/uL


(1.8-7.7)


 


Lymphocytes # (Auto)


 


 


 


 1.4 x10^3/uL


(1.0-4.8)


 


Monocytes # (Auto)


 


 


 


 0.6 x10^3/uL


(0.0-1.1)


 


Eosinophils # (Auto)


 


 


 


 0.2 x10^3/uL


(0.0-0.7)


 


Basophils # (Auto)


 


 


 


 0.0 x10^3/uL


(0.0-0.2)


 


Sodium Level


 


 


 


 142 mmol/L


(136-145)


 


Potassium Level


 


 


 


 3.4 mmol/L


(3.5-5.1)


 


Chloride Level


 


 


 


 104 mmol/L


()


 


Carbon Dioxide Level


 


 


 


 34 mmol/L


(21-32)


 


Anion Gap    4 (6-14) 


 


Blood Urea Nitrogen


 


 


 


 24 mg/dL


(7-20)


 


Creatinine


 


 


 


 0.5 mg/dL


(0.6-1.0)


 


Estimated GFR


(Cockcroft-Gault) 


 


 


 131.1 





 


Glucose Level


 


 


 


 151 mg/dL


(70-99)


 


Calcium Level


 


 


 


 8.4 mg/dL


(8.5-10.1)


 


Phosphorus Level


 


 


 


 3.9 mg/dL


(2.6-4.7)


 


Magnesium Level


 


 


 


 1.9 mg/dL


(1.8-2.4)


 


Test


 5/14/20


06:05 5/14/20


11:37 5/14/20


18:00 5/15/20


00:49


 


Glucose (Fingerstick)


 143 mg/dL


(70-99) 200 mg/dL


(70-99) 174 mg/dL


(70-99) 152 mg/dL


(70-99)


 


Test


 5/15/20


06:40 5/15/20


06:43 


 





 


White Blood Count


 9.0 x10^3/uL


(4.0-11.0) 


 


 





 


Red Blood Count


 2.56 x10^6/uL


(3.50-5.40) 


 


 





 


Hemoglobin


 7.2 g/dL


(12.0-15.5) 


 


 





 


Hematocrit


 22.8 %


(36.0-47.0) 


 


 





 


Mean Corpuscular Volume 89 fL ()    


 


Mean Corpuscular Hemoglobin 28 pg (25-35)    


 


Mean Corpuscular Hemoglobin


Concent 32 g/dL


(31-37) 


 


 





 


Red Cell Distribution Width


 17.9 %


(11.5-14.5) 


 


 





 


Platelet Count


 489 x10^3/uL


(140-400) 


 


 





 


Neutrophils (%) (Auto) 79 % (31-73)    


 


Lymphocytes (%) (Auto) 13 % (24-48)    


 


Monocytes (%) (Auto) 6 % (0-9)    


 


Eosinophils (%) (Auto) 2 % (0-3)    


 


Basophils (%) (Auto) 0 % (0-3)    


 


Neutrophils # (Auto)


 7.1 x10^3/uL


(1.8-7.7) 


 


 





 


Lymphocytes # (Auto)


 1.2 x10^3/uL


(1.0-4.8) 


 


 





 


Monocytes # (Auto)


 0.5 x10^3/uL


(0.0-1.1) 


 


 





 


Eosinophils # (Auto)


 0.2 x10^3/uL


(0.0-0.7) 


 


 





 


Basophils # (Auto)


 0.0 x10^3/uL


(0.0-0.2) 


 


 





 


Sodium Level


 143 mmol/L


(136-145) 


 


 





 


Potassium Level


 4.1 mmol/L


(3.5-5.1) 


 


 





 


Chloride Level


 103 mmol/L


() 


 


 





 


Carbon Dioxide Level


 38 mmol/L


(21-32) 


 


 





 


Anion Gap 2 (6-14)    


 


Blood Urea Nitrogen


 25 mg/dL


(7-20) 


 


 





 


Creatinine


 0.6 mg/dL


(0.6-1.0) 


 


 





 


Estimated GFR


(Cockcroft-Gault) 106.3 


 


 


 





 


Glucose Level


 115 mg/dL


(70-99) 


 


 





 


Calcium Level


 8.5 mg/dL


(8.5-10.1) 


 


 





 


Glucose (Fingerstick)


 


 110 mg/dL


(70-99) 


 











Laboratory Tests








Test


 5/14/20


11:37 5/14/20


18:00 5/15/20


00:49 5/15/20


06:40


 


Glucose (Fingerstick)


 200 mg/dL


(70-99) 174 mg/dL


(70-99) 152 mg/dL


(70-99) 





 


White Blood Count


 


 


 


 9.0 x10^3/uL


(4.0-11.0)


 


Red Blood Count


 


 


 


 2.56 x10^6/uL


(3.50-5.40)


 


Hemoglobin


 


 


 


 7.2 g/dL


(12.0-15.5)


 


Hematocrit


 


 


 


 22.8 %


(36.0-47.0)


 


Mean Corpuscular Volume    89 fL () 


 


Mean Corpuscular Hemoglobin    28 pg (25-35) 


 


Mean Corpuscular Hemoglobin


Concent 


 


 


 32 g/dL


(31-37)


 


Red Cell Distribution Width


 


 


 


 17.9 %


(11.5-14.5)


 


Platelet Count


 


 


 


 489 x10^3/uL


(140-400)


 


Neutrophils (%) (Auto)    79 % (31-73) 


 


Lymphocytes (%) (Auto)    13 % (24-48) 


 


Monocytes (%) (Auto)    6 % (0-9) 


 


Eosinophils (%) (Auto)    2 % (0-3) 


 


Basophils (%) (Auto)    0 % (0-3) 


 


Neutrophils # (Auto)


 


 


 


 7.1 x10^3/uL


(1.8-7.7)


 


Lymphocytes # (Auto)


 


 


 


 1.2 x10^3/uL


(1.0-4.8)


 


Monocytes # (Auto)


 


 


 


 0.5 x10^3/uL


(0.0-1.1)


 


Eosinophils # (Auto)


 


 


 


 0.2 x10^3/uL


(0.0-0.7)


 


Basophils # (Auto)


 


 


 


 0.0 x10^3/uL


(0.0-0.2)


 


Sodium Level


 


 


 


 143 mmol/L


(136-145)


 


Potassium Level


 


 


 


 4.1 mmol/L


(3.5-5.1)


 


Chloride Level


 


 


 


 103 mmol/L


()


 


Carbon Dioxide Level


 


 


 


 38 mmol/L


(21-32)


 


Anion Gap    2 (6-14) 


 


Blood Urea Nitrogen


 


 


 


 25 mg/dL


(7-20)


 


Creatinine


 


 


 


 0.6 mg/dL


(0.6-1.0)


 


Estimated GFR


(Cockcroft-Gault) 


 


 


 106.3 





 


Glucose Level


 


 


 


 115 mg/dL


(70-99)


 


Calcium Level


 


 


 


 8.5 mg/dL


(8.5-10.1)


 


Test


 5/15/20


06:43 


 


 





 


Glucose (Fingerstick)


 110 mg/dL


(70-99) 


 


 











Problem List


Problems


Medical Problems:


(1) Acute pancreatitis


Status: Acute  





(2) Cholelithiasis


Status: Acute  








Assessment/Plan


severe pancreatitis


cont supportive care











GAMAL ZHOU MD             May 15, 2020 08:51

## 2020-05-15 NOTE — PDOC
Objective:


Objective:


D/w nurse - stable, plans to get up to shower w/ therapy.


Vital Signs:





                                   Vital Signs








  Date Time  Temp Pulse Resp B/P (MAP) Pulse Ox O2 Delivery O2 Flow Rate FiO2


 


5/15/20 09:36   32     


 


5/15/20 08:00      Nasal Cannula 5.0 


 


5/15/20 08:00 97.5 88  109/69 (82) 100   





 97.5       








Labs:





Laboratory Tests








Test


 5/14/20


11:37 5/14/20


18:00 5/15/20


00:49 5/15/20


06:40


 


Glucose (Fingerstick) 200 mg/dL  174 mg/dL  152 mg/dL  


 


White Blood Count    9.0 x10^3/uL 


 


Red Blood Count    2.56 x10^6/uL 


 


Hemoglobin    7.2 g/dL 


 


Hematocrit    22.8 % 


 


Mean Corpuscular Volume    89 fL 


 


Mean Corpuscular Hemoglobin    28 pg 


 


Mean Corpuscular Hemoglobin


Concent 


 


 


 32 g/dL 





 


Red Cell Distribution Width    17.9 % 


 


Platelet Count    489 x10^3/uL 


 


Neutrophils (%) (Auto)    79 % 


 


Lymphocytes (%) (Auto)    13 % 


 


Monocytes (%) (Auto)    6 % 


 


Eosinophils (%) (Auto)    2 % 


 


Basophils (%) (Auto)    0 % 


 


Neutrophils # (Auto)    7.1 x10^3/uL 


 


Lymphocytes # (Auto)    1.2 x10^3/uL 


 


Monocytes # (Auto)    0.5 x10^3/uL 


 


Eosinophils # (Auto)    0.2 x10^3/uL 


 


Basophils # (Auto)    0.0 x10^3/uL 


 


Sodium Level    143 mmol/L 


 


Potassium Level    4.1 mmol/L 


 


Chloride Level    103 mmol/L 


 


Carbon Dioxide Level    38 mmol/L 


 


Anion Gap    2 


 


Blood Urea Nitrogen    25 mg/dL 


 


Creatinine    0.6 mg/dL 


 


Estimated GFR


(Cockcroft-Gault) 


 


 


 106.3 





 


Glucose Level    115 mg/dL 


 


Calcium Level    8.5 mg/dL 


 


Test


 5/15/20


06:43 


 


 





 


Glucose (Fingerstick) 110 mg/dL    











PE:





GEN: NAD


NEURO/PSYCH: sleeping, not awakened





A/P:


Gallstone pancreatitis, MOSF





--


Continue same per GI.





Hemodynamically unstable?:  No


Is patient in severe pain?:  No


Is NPO status required?:  Yes











RAGHAVENDRA MURCIA         May 15, 2020 10:48

## 2020-05-15 NOTE — PDOC
PULMONARY PROGRESS NOTES


Subjective


Patient intubated on 3/23 , s/p trach 4/6, awake alert follows commands


placed on cap trach


s/p left tap 5/12 , 3litres removed


Vitals





Vital Signs








  Date Time  Temp Pulse Resp B/P (MAP) Pulse Ox O2 Delivery O2 Flow Rate FiO2


 


5/15/20 09:36   32     


 


5/15/20 08:00      Nasal Cannula 5.0 


 


5/15/20 08:00 97.5 88  109/69 (82) 100   





 97.5       








ROS:  No Chest Pain, No Abdominal Pain, No Increase Cough


General:  Alert, No acute distress


Lungs:  Other (decrease bs)


Cardiovascular:  S1, S2


Abdomen:  Soft, Non-tender, Other


Neuro Exam:  Alert


Extremities:  Other (+3 generalized edema )


Skin:  Warm, Dry


Labs





Laboratory Tests








Test


 5/13/20


17:29 5/13/20


23:35 5/14/20


05:45 5/14/20


06:05


 


Glucose (Fingerstick)


 180 mg/dL


(70-99) 164 mg/dL


(70-99) 


 143 mg/dL


(70-99)


 


White Blood Count


 


 


 10.5 x10^3/uL


(4.0-11.0) 





 


Red Blood Count


 


 


 2.73 x10^6/uL


(3.50-5.40) 





 


Hemoglobin


 


 


 7.7 g/dL


(12.0-15.5) 





 


Hematocrit


 


 


 24.2 %


(36.0-47.0) 





 


Mean Corpuscular Volume   89 fL ()  


 


Mean Corpuscular Hemoglobin   28 pg (25-35)  


 


Mean Corpuscular Hemoglobin


Concent 


 


 32 g/dL


(31-37) 





 


Red Cell Distribution Width


 


 


 17.9 %


(11.5-14.5) 





 


Platelet Count


 


 


 521 x10^3/uL


(140-400) 





 


Neutrophils (%) (Auto)   80 % (31-73)  


 


Lymphocytes (%) (Auto)   13 % (24-48)  


 


Monocytes (%) (Auto)   6 % (0-9)  


 


Eosinophils (%) (Auto)   2 % (0-3)  


 


Basophils (%) (Auto)   0 % (0-3)  


 


Neutrophils # (Auto)


 


 


 8.4 x10^3/uL


(1.8-7.7) 





 


Lymphocytes # (Auto)


 


 


 1.4 x10^3/uL


(1.0-4.8) 





 


Monocytes # (Auto)


 


 


 0.6 x10^3/uL


(0.0-1.1) 





 


Eosinophils # (Auto)


 


 


 0.2 x10^3/uL


(0.0-0.7) 





 


Basophils # (Auto)


 


 


 0.0 x10^3/uL


(0.0-0.2) 





 


Sodium Level


 


 


 142 mmol/L


(136-145) 





 


Potassium Level


 


 


 3.4 mmol/L


(3.5-5.1) 





 


Chloride Level


 


 


 104 mmol/L


() 





 


Carbon Dioxide Level


 


 


 34 mmol/L


(21-32) 





 


Anion Gap   4 (6-14)  


 


Blood Urea Nitrogen


 


 


 24 mg/dL


(7-20) 





 


Creatinine


 


 


 0.5 mg/dL


(0.6-1.0) 





 


Estimated GFR


(Cockcroft-Gault) 


 


 131.1 


 





 


Glucose Level


 


 


 151 mg/dL


(70-99) 





 


Calcium Level


 


 


 8.4 mg/dL


(8.5-10.1) 





 


Phosphorus Level


 


 


 3.9 mg/dL


(2.6-4.7) 





 


Magnesium Level


 


 


 1.9 mg/dL


(1.8-2.4) 





 


Test


 5/14/20


11:37 5/14/20


18:00 5/15/20


00:49 5/15/20


06:40


 


Glucose (Fingerstick)


 200 mg/dL


(70-99) 174 mg/dL


(70-99) 152 mg/dL


(70-99) 





 


White Blood Count


 


 


 


 9.0 x10^3/uL


(4.0-11.0)


 


Red Blood Count


 


 


 


 2.56 x10^6/uL


(3.50-5.40)


 


Hemoglobin


 


 


 


 7.2 g/dL


(12.0-15.5)


 


Hematocrit


 


 


 


 22.8 %


(36.0-47.0)


 


Mean Corpuscular Volume    89 fL () 


 


Mean Corpuscular Hemoglobin    28 pg (25-35) 


 


Mean Corpuscular Hemoglobin


Concent 


 


 


 32 g/dL


(31-37)


 


Red Cell Distribution Width


 


 


 


 17.9 %


(11.5-14.5)


 


Platelet Count


 


 


 


 489 x10^3/uL


(140-400)


 


Neutrophils (%) (Auto)    79 % (31-73) 


 


Lymphocytes (%) (Auto)    13 % (24-48) 


 


Monocytes (%) (Auto)    6 % (0-9) 


 


Eosinophils (%) (Auto)    2 % (0-3) 


 


Basophils (%) (Auto)    0 % (0-3) 


 


Neutrophils # (Auto)


 


 


 


 7.1 x10^3/uL


(1.8-7.7)


 


Lymphocytes # (Auto)


 


 


 


 1.2 x10^3/uL


(1.0-4.8)


 


Monocytes # (Auto)


 


 


 


 0.5 x10^3/uL


(0.0-1.1)


 


Eosinophils # (Auto)


 


 


 


 0.2 x10^3/uL


(0.0-0.7)


 


Basophils # (Auto)


 


 


 


 0.0 x10^3/uL


(0.0-0.2)


 


Sodium Level


 


 


 


 143 mmol/L


(136-145)


 


Potassium Level


 


 


 


 4.1 mmol/L


(3.5-5.1)


 


Chloride Level


 


 


 


 103 mmol/L


()


 


Carbon Dioxide Level


 


 


 


 38 mmol/L


(21-32)


 


Anion Gap    2 (6-14) 


 


Blood Urea Nitrogen


 


 


 


 25 mg/dL


(7-20)


 


Creatinine


 


 


 


 0.6 mg/dL


(0.6-1.0)


 


Estimated GFR


(Cockcroft-Gault) 


 


 


 106.3 





 


Glucose Level


 


 


 


 115 mg/dL


(70-99)


 


Calcium Level


 


 


 


 8.5 mg/dL


(8.5-10.1)


 


Test


 5/15/20


06:43 


 


 





 


Glucose (Fingerstick)


 110 mg/dL


(70-99) 


 


 











Laboratory Tests








Test


 5/14/20


11:37 5/14/20


18:00 5/15/20


00:49 5/15/20


06:40


 


Glucose (Fingerstick)


 200 mg/dL


(70-99) 174 mg/dL


(70-99) 152 mg/dL


(70-99) 





 


White Blood Count


 


 


 


 9.0 x10^3/uL


(4.0-11.0)


 


Red Blood Count


 


 


 


 2.56 x10^6/uL


(3.50-5.40)


 


Hemoglobin


 


 


 


 7.2 g/dL


(12.0-15.5)


 


Hematocrit


 


 


 


 22.8 %


(36.0-47.0)


 


Mean Corpuscular Volume    89 fL () 


 


Mean Corpuscular Hemoglobin    28 pg (25-35) 


 


Mean Corpuscular Hemoglobin


Concent 


 


 


 32 g/dL


(31-37)


 


Red Cell Distribution Width


 


 


 


 17.9 %


(11.5-14.5)


 


Platelet Count


 


 


 


 489 x10^3/uL


(140-400)


 


Neutrophils (%) (Auto)    79 % (31-73) 


 


Lymphocytes (%) (Auto)    13 % (24-48) 


 


Monocytes (%) (Auto)    6 % (0-9) 


 


Eosinophils (%) (Auto)    2 % (0-3) 


 


Basophils (%) (Auto)    0 % (0-3) 


 


Neutrophils # (Auto)


 


 


 


 7.1 x10^3/uL


(1.8-7.7)


 


Lymphocytes # (Auto)


 


 


 


 1.2 x10^3/uL


(1.0-4.8)


 


Monocytes # (Auto)


 


 


 


 0.5 x10^3/uL


(0.0-1.1)


 


Eosinophils # (Auto)


 


 


 


 0.2 x10^3/uL


(0.0-0.7)


 


Basophils # (Auto)


 


 


 


 0.0 x10^3/uL


(0.0-0.2)


 


Sodium Level


 


 


 


 143 mmol/L


(136-145)


 


Potassium Level


 


 


 


 4.1 mmol/L


(3.5-5.1)


 


Chloride Level


 


 


 


 103 mmol/L


()


 


Carbon Dioxide Level


 


 


 


 38 mmol/L


(21-32)


 


Anion Gap    2 (6-14) 


 


Blood Urea Nitrogen


 


 


 


 25 mg/dL


(7-20)


 


Creatinine


 


 


 


 0.6 mg/dL


(0.6-1.0)


 


Estimated GFR


(Cockcroft-Gault) 


 


 


 106.3 





 


Glucose Level


 


 


 


 115 mg/dL


(70-99)


 


Calcium Level


 


 


 


 8.5 mg/dL


(8.5-10.1)


 


Test


 5/15/20


06:43 


 


 





 


Glucose (Fingerstick)


 110 mg/dL


(70-99) 


 


 











Medications





Active Scripts








 Medications  Dose


 Route/Sig


 Max Daily Dose Days Date Category


 


 Bisoprolol


 Fumarate 5 Mg


 Tablet  10 Mg


 PO DAILY


   3/16/20 Reported








Comments


CXR  5/12/2020





persistent moderate effusions





Impression


.


IMPRESSION:


1.  Acute hypoxemic respiratory failure secondary to ARDS status post trach,


2.  Gallstone pancreatitis


3.  Severe metabolic acidosis.stable


4.  Acute kidney injury-stable, Off HD-- continue to improve 


5.  Acute gallstone pancreatitis.


6.  Hypoalbuminemia.


7.  Moderate persistent effusions, s/p left thora  5/12


8.  Fever-  Per ID, per surgery--resolved 


9.  Chronic anemia


10. Covid 19 testing negative


11. Moderate to large ascites-S/P paracentisis


12.S/P paracentisis with 4 liters removed on 4/15/20


13. S/P IR drain placement on 5/8/2020





Plan


.


d/w IR , s/p  thoracentesis, 5/12, 3 litres removed


TS all day wit hprn capping


Follow surgery recs-- S/P 3 drain placed in IR on 5/8/2020


Follow ID recs for ABX


Follow nephrology recs 


Continue TPN 


DVT/GI PPX: heparin SQ/ protonix 


D/W RN and RT





CODE:FULL











SHARYN SOLORZANO MD                 May 15, 2020 11:21

## 2020-05-15 NOTE — PDOC
Infectious Disease Note


Subjective


Subjective


feeling better says, awake nods appropriately


off vent, trach o2





Vital Sign


Vital Signs





Vital Signs








  Date Time  Temp Pulse Resp B/P (MAP) Pulse Ox O2 Delivery O2 Flow Rate FiO2


 


5/15/20 06:00  78 14 73/51 (58) 100 Nasal Cannula 5.0 


 


5/15/20 04:00 98.3       





 98.3       











Physical Exam


PHYSICAL EXAM


GENERAL: Up in chair very anxious extremely weak shaky probably confused but on 

Precedex


HEENT:  oral cavity dry, NGT


NECK:  Trach/vent 


LUNGS: Improved aeration has humidified O2 via trach shield


HEART:  S1, S2, regular 


ABDOMEN: Distended, hypoactive BS, tender, + drains x 3


: [place] (4/14)


EXTREMITIES: Generalized edema, no cyanosis, SCDs bilaterally 


SKIN: Warm and dry.  No generalized rash.  


CNS: Nodded some to couple questions 


PICC(4/30) clean





Labs


Lab





Laboratory Tests








Test


 5/14/20


11:37 5/14/20


18:00 5/15/20


00:49 5/15/20


06:40


 


Glucose (Fingerstick)


 200 mg/dL


(70-99) 174 mg/dL


(70-99) 152 mg/dL


(70-99) 





 


White Blood Count


 


 


 


 9.0 x10^3/uL


(4.0-11.0)


 


Red Blood Count


 


 


 


 2.56 x10^6/uL


(3.50-5.40)


 


Hemoglobin


 


 


 


 7.2 g/dL


(12.0-15.5)


 


Hematocrit


 


 


 


 22.8 %


(36.0-47.0)


 


Mean Corpuscular Volume    89 fL () 


 


Mean Corpuscular Hemoglobin    28 pg (25-35) 


 


Mean Corpuscular Hemoglobin


Concent 


 


 


 32 g/dL


(31-37)


 


Red Cell Distribution Width


 


 


 


 17.9 %


(11.5-14.5)


 


Platelet Count


 


 


 


 489 x10^3/uL


(140-400)


 


Neutrophils (%) (Auto)    79 % (31-73) 


 


Lymphocytes (%) (Auto)    13 % (24-48) 


 


Monocytes (%) (Auto)    6 % (0-9) 


 


Eosinophils (%) (Auto)    2 % (0-3) 


 


Basophils (%) (Auto)    0 % (0-3) 


 


Neutrophils # (Auto)


 


 


 


 7.1 x10^3/uL


(1.8-7.7)


 


Lymphocytes # (Auto)


 


 


 


 1.2 x10^3/uL


(1.0-4.8)


 


Monocytes # (Auto)


 


 


 


 0.5 x10^3/uL


(0.0-1.1)


 


Eosinophils # (Auto)


 


 


 


 0.2 x10^3/uL


(0.0-0.7)


 


Basophils # (Auto)


 


 


 


 0.0 x10^3/uL


(0.0-0.2)


 


Sodium Level


 


 


 


 143 mmol/L


(136-145)


 


Potassium Level


 


 


 


 4.1 mmol/L


(3.5-5.1)


 


Chloride Level


 


 


 


 103 mmol/L


()


 


Carbon Dioxide Level


 


 


 


 38 mmol/L


(21-32)


 


Anion Gap    2 (6-14) 


 


Blood Urea Nitrogen


 


 


 


 25 mg/dL


(7-20)


 


Creatinine


 


 


 


 0.6 mg/dL


(0.6-1.0)


 


Estimated GFR


(Cockcroft-Gault) 


 


 


 106.3 





 


Glucose Level


 


 


 


 115 mg/dL


(70-99)


 


Calcium Level


 


 


 


 8.5 mg/dL


(8.5-10.1)


 


Test


 5/15/20


06:43 


 


 





 


Glucose (Fingerstick)


 110 mg/dL


(70-99) 


 


 











Micro








Objective


Assessment


Fever  - better currently - 


Abd distention - U/S and CT reviewed s/p 0.4 L of opaque, debris-containing 

ascites was removed 5/6


Acute pancreatitis with persistent necrosis


  - 4/27 status post ROBERT drain placement + C paropsilosis. s/p additional drains 

5/8


Anemia - S/p PRBCs


Cholelithiasis with thickening of the gallbladder wall.


Leucocytosis improving


JED, hyperkalemia, Metabolic acidosis off dialysis


Acute hypoxic resp failure ,bilateral pleural effusion and atelectasis


hypocalcemia 


Prediabetes


HTN


s/p trach





Plan


Plan of Care


, merrem 4/8 -


Continue micafungin


f/u cultures 


Maintain aspiration precaution


Supportive care











LINN FRANZ MD               May 15, 2020 09:32

## 2020-05-15 NOTE — NUR
SS following for discharge planning. SS reviewed pt chart and discussed with RNYessica. Pt 
remains on trach collar. Pt has cap on speaking valve. Pt NPO and has NG tube and TPN. Pt 
has ROBERT drains times four. Pt moving more with PT/OT. Pt was able to get to shower today with 
PT/OT and has been up in chair. SS will continue to follow for discharge planning.

## 2020-05-15 NOTE — NUR
Pharmacy TPN Dosing Note



S: JESENIA RICH is a 49 year old F Currently receiving Central Continuous TPN started 
03/18/20



B:Pertinent PMH: Necrotizing pancreatitis

Height: 5 feet, 8 inches

Weight: 90.990980 kg



Current diet: NPO 



LABS:

Sodium:    143 

Potassium: 4.1 

Chloride:  103 

Calcium:   8.5 

Corrected Calcium: 10.26 

Magnesium: 1.9 

CO2:       38 

SCr:       0.6 

Glucose:   110-208 

Albumin:   1.8 

AST:       50 

ALT:       50 



TPN FORMULA:

TPN TYPE:  Central Continuous

AMINO ACIDS:         90 gm

DEXTROSE:            250 gm

LIPIDS:              30 gm

POTASSIUM CHLORIDE:  90 mEq

MAGNESIUM:           20 mEq

INSULIN:             15 units

MULTIPLE VITAMIN:    10 ml

TRACE ELEMENTS:      0.5 ml(s)



TPN PLAN:  

K normalized. Increased macros per RD recommendations. BG variable- will

cont insulin dose as is with dextrose increase.

-No labs ordered as they have been stable.





R: Change TPN as noted above.

Will monitor electrolytes, glucose, and tolerance to TPN.



 LORENZO LESLIE [facility], 05/15/20 [id number]

## 2020-05-15 NOTE — PDOC
[facility] PROGRESS NOTE


Chief Complaint


Chief Complaint


Acute hypoxic Respiratory failure requiring mechanical ventilation (now 

extubated for several days but still with tracheostomy)


Tracheostomy


bilateral pleural effusions/pulm edema 


Sepsis


Severe Acute gallstone pancreatitis (not a surgical candidate at this time) with

necrosis


Acute kidney failure now requiring dialysis


Salpingitis


Gallstones (Calculus of gallbladder with acute cholecystitis without obstruc

tion)


HTN 


Leukocytosis 


Hypoxia


Uterine fibroid


Intractable pain


Intractable nausea


Covid 19 negative. 


Acute on chronic anemia 


EEG: No seizure activityFever  - better currently - intermittent could be from 

underlying pancreatitis blood cults 5/4 - neg so far


? Ileus with vomiting


Abd distention - U/S and CT reviewed s/p 0.4 L of opaque, debris-containing 

ascites was removed 5/6


Acute pancreatitis with persistent necrosis


- 4/27 status post ROBERT drain placement + C paropsilosis. s/p additional drains 

5/8


Anemia - S/p PRBCs


Cholelithiasis with thickening of the gallbladder wall.


Leucocytosis improving


JED, hyperkalemia, Metabolic acidosis off dialysis


Acute hypoxic resp failure ,bilateral pleural effusion and atelectasis


hypocalcemia 


Prediabetes


HTN


s/p trach


ESRD on HD


Hyperglycemia





History of Present Illness


History of Present Illness


5/15/2020


Patient seen and examined in the ICU


She is resting comfortably but sedated


Has O2 per nasal cannula


Tracheostomy is clean


She is on Precedex TPN and has an NG to suction


Chart reviewed





5/14/2020


Patient seen and examined in the ICU


She now has a speaking valve in her trach and is able to talk


She is extremely weak and shaky


Chart reviewed discussed with RN





5/13/2020


Patient seen and examined in the ICU


She is up in the chair with O2 via trach shield


Extremely anxious


Cannot talk but is trying to write things to me although I cannot understand 

what she is writing


Discussed with RN


Chart reviewed


Still on IV TPN


Still extremely ill








5/12/2020


Patient seen and examined in the ICU


She now has a trach shield


Pleasantly confused but sedated with Precedex


Chart reviewed


Discussed with RN


She has IV TPN


Still very critically ill





5/11/2020


Patient seen and examined


She remains on the vent but spontaneous respirations with 20 of pressure support

and 30% FiO2


He still has NG to suction


Appears extremely ill and weak and confused


Has IV TPN Precedex antibiotics


[place] is to bedside drainage


She has SCDs and ProCare boots


She is on IV meropenem and daptomycin and micafungin


Chart reviewed


Discussed with RN


Patient is still critically ill








Ms Tadeo is a 48yo F w/ PMHx HTN, prediabetes who presented to the emergency 

room with complaints of abdominal pain on 3/16/2020. Found with Lipase 80661, 

, , Bilirubin 1.4.


CT abdomen confirms pancreatic inflammation, peripancreatic fluid and inflamm

atory changes around the pancreas consistent with pancreatitis. Cholelithiasis 

and 1.4cm uterine fibroid as well as possible left salpingitis. Admitted for 

further care


GI, General surgery, ID, Pulm consulted.





3/17: PICC placed per IR. Renal US negative. Started on levophed. Repeat CT 

abdomen w/ necrosis; 3/18: Dialysis catheter per nephrology; 3/19: On BiPAP; 

3/20: BiPAP, dialysis; 3/21: Overnight Tmax 101.7 , still on BiPAP FiO2 40%, 

still on low dose Levophed gtt, TPN initiated. On dialysis


4/6: Tracheostomy; 4/12: S/p tracheostomy on vent spontaneous respirations with 

5 of pressure support 35% FiO2, rectal tube and a [place], off pressors; 

4/14:Still on vent via trach. Removed PICC and CVC LIJ and replaced. CT 

chest/abd/pelvis with bilateral pleural effusion and ascites.


4/15: Renal function stable. Still on vent. More interactive today. Miming wish 

for food. Plan discussed for thoracentesis/paracentesis with daughters today. 

They were under impression patient was doing worse due to a miscommunication 

which has been clarified over the phone. 4.3L removed.


4/17: Febrile overnight 101.8F. More interactive, still on vent. Asking for ice 

by miming; 4/18: Afebrile overnight. TMax last 24 hours 100.6F. Hb 7.1. 

Interactive when awake. 4/22: Transfusion 1u PRBC (6U total since admit)


4/23-4/26: TPN and precedex, vent.


4/27: Tmax 101F overnight. Hb 8.2. HD cath out since 4/24. Alert. On vent SIMV 

35% FiO2. Surgery: ex-lap, no naif or pancreatic necrosectomy 2/2 profound 

inflammation.


4/28: Seen POD #1. Afebrile overnight. BUN 62. CBC WNL today.Remains on vent via

trach, TPN. Able to point today and indicate she wishes her daughters and Rolf 

to be involved in her care.


4/29: Hb 7.4, Na 151. Remains on vent via trach, TPN. off HD for now. Not 

tolerating trach shield well.


4/30: Negative US for UE DVT. More relaxed today. Has some increase in UOP. 

Tolerating vent well. She is requesting to eat and drink via writing. T max 100F

24 hours ago, but 101F at 1200. Right PICC placed. [facility]


5[facility]: Na 153 today. Good UOP. Tmax 101F at 1200 on 4/30. She becomes a bit 

anxious and did not sleep much last night, wishes to try to sleep this morning


5/2: Able to speak well with speaking valve today. Na 153, K2.9, Mg 1.8, Cr 1. 

100.4F axillary temp overnight. Asking for food.


5/3: Afebrile overnight. ABG with pH 7.27/75/123. Hb 7.1. Na 153. PCA settings 

decreased


5/4: No acute events reported overnight, case discussed with nursing staff 

patient in no acute distress no complaints during my visit


5/5: Patient responding to verbal stimuli.  She is saturating well and not 

requiring ventilation support, no acute events reported overnight seems to be 

slowly improving


5/6: Patient requiring transfusion of packed red blood cells due to anemia, no 

evidence of acute bleeding, appreciate GI consultant recommendations


5/7: Patient required ventilatory support given that she desaturated, she is 

lying in bed in mild distress, case discussed with nursing staff, no evidence of

bleeding, h an h noted. 


5/8: Underwent IR procedure as follows


BRIEF OPERATIVE NOTE


Pre-Op Diagnosis


Pancreatitis with pseudocysts, suspected infection


Post-Op Diagnosis


same


Procedure Performed


CT abdominal Drains x 3


Surgeon


Hardy


Anesthesia Type:  Conscious Sedation


Findings


3 abdominal drains, 14F, with turbid pancreatic fluid and necrotic debris in 

each.


Complications


No immediate








5/9: Patient today somewhat restless and having bilious secretions from ET tube,

imaging studies ordered, discussed with consultant. Pretty poor prognosis, 

hopefully is not a fistula, poor surgical candidate. 





5/10: Imaging with no acute events, she seems more stable today compared to 

yesterday. Encouraged as much activity as possible patient at high risk for armani

re depression.





Vitals/I&O


Vitals/I&O:





                                   Vital Signs








  Date Time  Temp Pulse Resp B/P (MAP) Pulse Ox O2 Delivery O2 Flow Rate FiO2


 


5/15/20 09:36   32     


 


5/15/20 08:00      Nasal Cannula 5.0 


 


5/15/20 08:00 97.5 88  109/69 (82) 100   





 97.5       














                                    I & O   


 


 5/14/20 5/14/20 5/15/20





 15:00 23:00 07:00


 


Intake Total 300 ml 1234 ml 1111 ml


 


Output Total 1590 ml 1080 ml 635 ml


 


Balance -1290 ml 154 ml 476 ml











Physical Exam


Physical Exam:


GENERAL: Resting with no apparent distress but sedated


HEENT:  oral cavity dry, NGT


NECK:  Trach/vent 


LUNGS: Improved aeration has humidified O2 via trach shield


HEART:  S1, S2, regular 


ABDOMEN: Distended, hypoactive BS, tender, + drains x 3


: [place] (4/14)


EXTREMITIES: Generalized edema, no cyanosis, SCDs bilaterally 


SKIN: Warm and dry.  No generalized rash.  


CNS: Resting


PICC(4/30) clean


General:  No acute distress


Heart:  Regular rate, Normal S1, Normal S2, No murmurs, Gallops


Lungs:  Other (decrease bs)


Abdomen:  Soft, No tenderness, Other (drains in place)


Extremities:  No clubbing, No cyanosis, No edema, Normal pulses, No 

tenderness/swelling


Skin:  Other (warm, dry)





Labs


Labs:





Laboratory Tests








Test


 5/14/20


11:37 5/14/20


18:00 5/15/20


00:49 5/15/20


06:40


 


Glucose (Fingerstick)


 200 mg/dL


(70-99) 174 mg/dL


(70-99) 152 mg/dL


(70-99) 





 


White Blood Count


 


 


 


 9.0 x10^3/uL


(4.0-11.0)


 


Red Blood Count


 


 


 


 2.56 x10^6/uL


(3.50-5.40)


 


Hemoglobin


 


 


 


 7.2 g/dL


(12.0-15.5)


 


Hematocrit


 


 


 


 22.8 %


(36.0-47.0)


 


Mean Corpuscular Volume    89 fL () 


 


Mean Corpuscular Hemoglobin    28 pg (25-35) 


 


Mean Corpuscular Hemoglobin


Concent 


 


 


 32 g/dL


(31-37)


 


Red Cell Distribution Width


 


 


 


 17.9 %


(11.5-14.5)


 


Platelet Count


 


 


 


 489 x10^3/uL


(140-400)


 


Neutrophils (%) (Auto)    79 % (31-73) 


 


Lymphocytes (%) (Auto)    13 % (24-48) 


 


Monocytes (%) (Auto)    6 % (0-9) 


 


Eosinophils (%) (Auto)    2 % (0-3) 


 


Basophils (%) (Auto)    0 % (0-3) 


 


Neutrophils # (Auto)


 


 


 


 7.1 x10^3/uL


(1.8-7.7)


 


Lymphocytes # (Auto)


 


 


 


 1.2 x10^3/uL


(1.0-4.8)


 


Monocytes # (Auto)


 


 


 


 0.5 x10^3/uL


(0.0-1.1)


 


Eosinophils # (Auto)


 


 


 


 0.2 x10^3/uL


(0.0-0.7)


 


Basophils # (Auto)


 


 


 


 0.0 x10^3/uL


(0.0-0.2)


 


Sodium Level


 


 


 


 143 mmol/L


(136-145)


 


Potassium Level


 


 


 


 4.1 mmol/L


(3.5-5.1)


 


Chloride Level


 


 


 


 103 mmol/L


()


 


Carbon Dioxide Level


 


 


 


 38 mmol/L


(21-32)


 


Anion Gap    2 (6-14) 


 


Blood Urea Nitrogen


 


 


 


 25 mg/dL


(7-20)


 


Creatinine


 


 


 


 0.6 mg/dL


(0.6-1.0)


 


Estimated GFR


(Cockcroft-Gault) 


 


 


 106.3 





 


Glucose Level


 


 


 


 115 mg/dL


(70-99)


 


Calcium Level


 


 


 


 8.5 mg/dL


(8.5-10.1)


 


Test


 5/15/20


06:43 


 


 





 


Glucose (Fingerstick)


 110 mg/dL


(70-99) 


 


 














Review of Systems


Review of Systems:


Unable to obtain





Assessment and Plan


Assessmemt and Plan


Problems


Medical Problems:


(1) Acute pancreatitis


Status: Acute  





(2) Cholelithiasis


Status: Acute  





Acute hypoxic Respiratory failure requiring mechanical ventilation (on vent 

since 3/23)


Tracheostomy


bilateral pleural effusions/pulm edema 


Sepsis


Severe Acute gallstone pancreatitis (not a surgical candidate at this time) with

necrosis


Acute kidney failure now requiring dialysis


Salpingitis


Gallstones (Calculus of gallbladder with acute cholecystitis without obstructi

on)


HTN 


Leukocytosis 


Hypoxia


Uterine fibroid


Intractable pain


Intractable nausea


Covid 19 negative. 


Acute on chronic anemia 


EEG: No seizure activityFever  - better currently - intermittent could be from 

underlying pancreatitis blood cults 5/4 - neg so far


? Ileus with vomiting


Abd distention - U/S and CT reviewed s/p 0.4 L of opaque, debris-containing 

ascites was removed 5/6


Acute pancreatitis with persistent necrosis


- 4/27 status post ROBERT drain placement + C paropsilosis. s/p additional drains 

5/8


Anemia - S/p PRBCs


Cholelithiasis with thickening of the gallbladder wall.


Leucocytosis improving


JED, hyperkalemia, Metabolic acidosis off dialysis


Acute hypoxic resp failure ,bilateral pleural effusion and atelectasis


hypocalcemia 


Prediabetes


HTN


s/p trach


ESRD on HD


Hyperglycemia








Plan


ICU monitoring


Wound care


Humidified O2 via nasal cannula for now but we can use trach shield as backup


NG suctioning


Continue IV antibiotics and micafungin


Sedation with Precedex


TPN protocol


Continue [place] to bedside drainage


Tracheostomy care


Hope to eventually move towards decannulation (we have a speaking valve for now)


Trend labs


Appreciate subspecialist input


She is critically ill


Total time 31-minute





Comment


Review of Relevant


I have reviewed the following items josy (where applicable) has been applied.


Medications:





Current Medications








 Medications


  (Trade)  Dose


 Ordered  Sig/Yee


 Route


 PRN Reason  Start Time


 Stop Time Status Last Admin


Dose Admin


 


 Potassium


 Chloride/Water  100 ml @ 


 100 mls/hr  1X  ONCE


 IV


   5/14/20 11:00


 5/14/20 11:59 DC 5/14/20 11:34





 


 Potassium


 Chloride 90 meq/


 Magnesium Sulfate


 20 meq/


 Multivitamins 10


 ml/Chromium/


 Copper/Manganese/


 Seleni/Zn 1 ml/


 Insulin Human


 Regular 15 unit/


 Total Parenteral


 Nutrition/Amino


 Acids/Dextrose/


 Fat Emulsion


 Intravenous  1,920 ml @ 


 80 mls/hr  TPN  CONT


 IV


   5/14/20 22:00


 5/15/20 21:59  5/14/20 22:57














Hemodynamically unstable?:  No


Is patient in severe pain?:  No


Is NPO status required?:  Yes











HECTOR MASON III DO           May 15, 2020 10:54

## 2020-05-16 VITALS — DIASTOLIC BLOOD PRESSURE: 79 MMHG | SYSTOLIC BLOOD PRESSURE: 143 MMHG

## 2020-05-16 VITALS — DIASTOLIC BLOOD PRESSURE: 55 MMHG | SYSTOLIC BLOOD PRESSURE: 98 MMHG

## 2020-05-16 VITALS — DIASTOLIC BLOOD PRESSURE: 86 MMHG | SYSTOLIC BLOOD PRESSURE: 166 MMHG

## 2020-05-16 VITALS — SYSTOLIC BLOOD PRESSURE: 81 MMHG | DIASTOLIC BLOOD PRESSURE: 44 MMHG

## 2020-05-16 VITALS — DIASTOLIC BLOOD PRESSURE: 58 MMHG | SYSTOLIC BLOOD PRESSURE: 116 MMHG

## 2020-05-16 VITALS — SYSTOLIC BLOOD PRESSURE: 110 MMHG | DIASTOLIC BLOOD PRESSURE: 55 MMHG

## 2020-05-16 VITALS — DIASTOLIC BLOOD PRESSURE: 98 MMHG | SYSTOLIC BLOOD PRESSURE: 167 MMHG

## 2020-05-16 VITALS — SYSTOLIC BLOOD PRESSURE: 113 MMHG | DIASTOLIC BLOOD PRESSURE: 59 MMHG

## 2020-05-16 VITALS — SYSTOLIC BLOOD PRESSURE: 106 MMHG | DIASTOLIC BLOOD PRESSURE: 45 MMHG

## 2020-05-16 VITALS — DIASTOLIC BLOOD PRESSURE: 84 MMHG | SYSTOLIC BLOOD PRESSURE: 152 MMHG

## 2020-05-16 VITALS — DIASTOLIC BLOOD PRESSURE: 59 MMHG | SYSTOLIC BLOOD PRESSURE: 107 MMHG

## 2020-05-16 VITALS — SYSTOLIC BLOOD PRESSURE: 113 MMHG | DIASTOLIC BLOOD PRESSURE: 62 MMHG

## 2020-05-16 VITALS — DIASTOLIC BLOOD PRESSURE: 77 MMHG | SYSTOLIC BLOOD PRESSURE: 195 MMHG

## 2020-05-16 VITALS — SYSTOLIC BLOOD PRESSURE: 91 MMHG | DIASTOLIC BLOOD PRESSURE: 49 MMHG

## 2020-05-16 VITALS — SYSTOLIC BLOOD PRESSURE: 92 MMHG | DIASTOLIC BLOOD PRESSURE: 51 MMHG

## 2020-05-16 VITALS — DIASTOLIC BLOOD PRESSURE: 45 MMHG | SYSTOLIC BLOOD PRESSURE: 87 MMHG

## 2020-05-16 VITALS — SYSTOLIC BLOOD PRESSURE: 140 MMHG | DIASTOLIC BLOOD PRESSURE: 81 MMHG

## 2020-05-16 VITALS — SYSTOLIC BLOOD PRESSURE: 83 MMHG | DIASTOLIC BLOOD PRESSURE: 50 MMHG

## 2020-05-16 VITALS — SYSTOLIC BLOOD PRESSURE: 148 MMHG | DIASTOLIC BLOOD PRESSURE: 89 MMHG

## 2020-05-16 VITALS — SYSTOLIC BLOOD PRESSURE: 123 MMHG | DIASTOLIC BLOOD PRESSURE: 58 MMHG

## 2020-05-16 VITALS — DIASTOLIC BLOOD PRESSURE: 48 MMHG | SYSTOLIC BLOOD PRESSURE: 79 MMHG

## 2020-05-16 VITALS — DIASTOLIC BLOOD PRESSURE: 73 MMHG | SYSTOLIC BLOOD PRESSURE: 159 MMHG

## 2020-05-16 VITALS — DIASTOLIC BLOOD PRESSURE: 46 MMHG | SYSTOLIC BLOOD PRESSURE: 77 MMHG

## 2020-05-16 LAB
BASOPHILS # BLD AUTO: 0 X10^3/UL (ref 0–0.2)
BASOPHILS NFR BLD: 0 % (ref 0–3)
EOSINOPHIL NFR BLD: 0.1 X10^3/UL (ref 0–0.7)
EOSINOPHIL NFR BLD: 1 % (ref 0–3)
ERYTHROCYTE [DISTWIDTH] IN BLOOD BY AUTOMATED COUNT: 18.1 % (ref 11.5–14.5)
HCT VFR BLD CALC: 23.6 % (ref 36–47)
HGB BLD-MCNC: 7.5 G/DL (ref 12–15.5)
LYMPHOCYTES # BLD: 1 X10^3/UL (ref 1–4.8)
LYMPHOCYTES NFR BLD AUTO: 9 % (ref 24–48)
MCH RBC QN AUTO: 28 PG (ref 25–35)
MCHC RBC AUTO-ENTMCNC: 32 G/DL (ref 31–37)
MCV RBC AUTO: 89 FL (ref 79–100)
MONO #: 0.6 X10^3/UL (ref 0–1.1)
MONOCYTES NFR BLD: 5 % (ref 0–9)
NEUT #: 9.5 X10^3/UL (ref 1.8–7.7)
NEUTROPHILS NFR BLD AUTO: 84 % (ref 31–73)
PLATELET # BLD AUTO: 524 X10^3/UL (ref 140–400)
RBC # BLD AUTO: 2.65 X10^6/UL (ref 3.5–5.4)
WBC # BLD AUTO: 11.3 X10^3/UL (ref 4–11)

## 2020-05-16 RX ADMIN — DEXMEDETOMIDINE HYDROCHLORIDE PRN MLS/HR: 100 INJECTION, SOLUTION, CONCENTRATE INTRAVENOUS at 21:38

## 2020-05-16 RX ADMIN — HYDROMORPHONE HYDROCHLORIDE PRN MG: 2 INJECTION INTRAMUSCULAR; INTRAVENOUS; SUBCUTANEOUS at 04:41

## 2020-05-16 RX ADMIN — INSULIN LISPRO SCH UNITS: 100 INJECTION, SOLUTION INTRAVENOUS; SUBCUTANEOUS at 06:00

## 2020-05-16 RX ADMIN — DEXMEDETOMIDINE HYDROCHLORIDE PRN MLS/HR: 100 INJECTION, SOLUTION, CONCENTRATE INTRAVENOUS at 09:51

## 2020-05-16 RX ADMIN — DEXMEDETOMIDINE HYDROCHLORIDE PRN MLS/HR: 100 INJECTION, SOLUTION, CONCENTRATE INTRAVENOUS at 12:37

## 2020-05-16 RX ADMIN — INSULIN LISPRO SCH UNITS: 100 INJECTION, SOLUTION INTRAVENOUS; SUBCUTANEOUS at 01:19

## 2020-05-16 RX ADMIN — BACITRACIN SCH MLS/HR: 5000 INJECTION, POWDER, FOR SOLUTION INTRAMUSCULAR at 14:34

## 2020-05-16 RX ADMIN — HYDROMORPHONE HYDROCHLORIDE PRN MG: 2 INJECTION INTRAMUSCULAR; INTRAVENOUS; SUBCUTANEOUS at 21:46

## 2020-05-16 RX ADMIN — INSULIN LISPRO SCH UNITS: 100 INJECTION, SOLUTION INTRAVENOUS; SUBCUTANEOUS at 17:53

## 2020-05-16 RX ADMIN — MEROPENEM SCH MLS/HR: 1 INJECTION, POWDER, FOR SOLUTION INTRAVENOUS at 21:38

## 2020-05-16 RX ADMIN — DEXTROSE SCH MLS/HR: 50 INJECTION, SOLUTION INTRAVENOUS at 10:29

## 2020-05-16 RX ADMIN — PANTOPRAZOLE SODIUM SCH MG: 40 INJECTION, POWDER, FOR SOLUTION INTRAVENOUS at 08:56

## 2020-05-16 RX ADMIN — BUMETANIDE SCH MG: 0.25 INJECTION INTRAMUSCULAR; INTRAVENOUS at 08:57

## 2020-05-16 RX ADMIN — DEXMEDETOMIDINE HYDROCHLORIDE PRN MLS/HR: 100 INJECTION, SOLUTION, CONCENTRATE INTRAVENOUS at 16:29

## 2020-05-16 RX ADMIN — HYDROMORPHONE HYDROCHLORIDE PRN MG: 2 INJECTION INTRAMUSCULAR; INTRAVENOUS; SUBCUTANEOUS at 11:09

## 2020-05-16 RX ADMIN — DEXMEDETOMIDINE HYDROCHLORIDE PRN MLS/HR: 100 INJECTION, SOLUTION, CONCENTRATE INTRAVENOUS at 06:45

## 2020-05-16 RX ADMIN — MEROPENEM SCH MLS/HR: 1 INJECTION, POWDER, FOR SOLUTION INTRAVENOUS at 14:28

## 2020-05-16 RX ADMIN — INSULIN LISPRO SCH UNITS: 100 INJECTION, SOLUTION INTRAVENOUS; SUBCUTANEOUS at 11:30

## 2020-05-16 RX ADMIN — Medication PRN EACH: at 10:03

## 2020-05-16 RX ADMIN — HYDROMORPHONE HYDROCHLORIDE PRN MG: 2 INJECTION INTRAMUSCULAR; INTRAVENOUS; SUBCUTANEOUS at 17:18

## 2020-05-16 RX ADMIN — MEROPENEM SCH MLS/HR: 1 INJECTION, POWDER, FOR SOLUTION INTRAVENOUS at 06:46

## 2020-05-16 RX ADMIN — DEXMEDETOMIDINE HYDROCHLORIDE PRN MLS/HR: 100 INJECTION, SOLUTION, CONCENTRATE INTRAVENOUS at 01:46

## 2020-05-16 NOTE — PDOC
PULMONARY PROGRESS NOTES


Subjective


Patient intubated on 3/23 , s/p trach 4/6, awake alert follows commands, small 

trach secretion, trach capped for 48 hrs


placed on cap trach


s/p left tap 5/12 , 3litres removed


Vitals





Vital Signs








  Date Time  Temp Pulse Resp B/P (MAP) Pulse Ox O2 Delivery O2 Flow Rate FiO2


 


5/16/20 06:00  85 14 81/44 (56) 99 Nasal Cannula 2.0 


 


5/16/20 04:00 98.3       





 98.3       








ROS:  No Chest Pain, No Abdominal Pain, No Increase Cough


General:  Alert, No acute distress


HEENT:  Other (nc at perrl     neck trach site ok  no lad  no thyromegaly)


Lungs:  Other (decrease bs)


Cardiovascular:  S1, S2


Abdomen:  Soft, Non-tender, Other


Neuro Exam:  Alert


Extremities:  Other (+3 generalized edema )


Skin:  Warm, Dry


Labs





Laboratory Tests








Test


 5/14/20


11:37 5/14/20


18:00 5/15/20


00:49 5/15/20


06:40


 


Glucose (Fingerstick)


 200 mg/dL


(70-99) 174 mg/dL


(70-99) 152 mg/dL


(70-99) 





 


White Blood Count


 


 


 


 9.0 x10^3/uL


(4.0-11.0)


 


Red Blood Count


 


 


 


 2.56 x10^6/uL


(3.50-5.40)


 


Hemoglobin


 


 


 


 7.2 g/dL


(12.0-15.5)


 


Hematocrit


 


 


 


 22.8 %


(36.0-47.0)


 


Mean Corpuscular Volume    89 fL () 


 


Mean Corpuscular Hemoglobin    28 pg (25-35) 


 


Mean Corpuscular Hemoglobin


Concent 


 


 


 32 g/dL


(31-37)


 


Red Cell Distribution Width


 


 


 


 17.9 %


(11.5-14.5)


 


Platelet Count


 


 


 


 489 x10^3/uL


(140-400)


 


Neutrophils (%) (Auto)    79 % (31-73) 


 


Lymphocytes (%) (Auto)    13 % (24-48) 


 


Monocytes (%) (Auto)    6 % (0-9) 


 


Eosinophils (%) (Auto)    2 % (0-3) 


 


Basophils (%) (Auto)    0 % (0-3) 


 


Neutrophils # (Auto)


 


 


 


 7.1 x10^3/uL


(1.8-7.7)


 


Lymphocytes # (Auto)


 


 


 


 1.2 x10^3/uL


(1.0-4.8)


 


Monocytes # (Auto)


 


 


 


 0.5 x10^3/uL


(0.0-1.1)


 


Eosinophils # (Auto)


 


 


 


 0.2 x10^3/uL


(0.0-0.7)


 


Basophils # (Auto)


 


 


 


 0.0 x10^3/uL


(0.0-0.2)


 


Sodium Level


 


 


 


 143 mmol/L


(136-145)


 


Potassium Level


 


 


 


 4.1 mmol/L


(3.5-5.1)


 


Chloride Level


 


 


 


 103 mmol/L


()


 


Carbon Dioxide Level


 


 


 


 38 mmol/L


(21-32)


 


Anion Gap    2 (6-14) 


 


Blood Urea Nitrogen


 


 


 


 25 mg/dL


(7-20)


 


Creatinine


 


 


 


 0.6 mg/dL


(0.6-1.0)


 


Estimated GFR


(Cockcroft-Gault) 


 


 


 106.3 





 


Glucose Level


 


 


 


 115 mg/dL


(70-99)


 


Calcium Level


 


 


 


 8.5 mg/dL


(8.5-10.1)


 


Test


 5/15/20


06:43 5/15/20


12:11 5/15/20


17:59 5/16/20


01:15


 


Glucose (Fingerstick)


 110 mg/dL


(70-99) 208 mg/dL


(70-99) 130 mg/dL


(70-99) 153 mg/dL


(70-99)


 


Test


 5/16/20


05:54 


 


 





 


Glucose (Fingerstick)


 119 mg/dL


(70-99) 


 


 











Laboratory Tests








Test


 5/15/20


06:40 5/15/20


06:43 5/15/20


12:11 5/15/20


17:59


 


White Blood Count


 9.0 x10^3/uL


(4.0-11.0) 


 


 





 


Red Blood Count


 2.56 x10^6/uL


(3.50-5.40) 


 


 





 


Hemoglobin


 7.2 g/dL


(12.0-15.5) 


 


 





 


Hematocrit


 22.8 %


(36.0-47.0) 


 


 





 


Mean Corpuscular Volume 89 fL ()    


 


Mean Corpuscular Hemoglobin 28 pg (25-35)    


 


Mean Corpuscular Hemoglobin


Concent 32 g/dL


(31-37) 


 


 





 


Red Cell Distribution Width


 17.9 %


(11.5-14.5) 


 


 





 


Platelet Count


 489 x10^3/uL


(140-400) 


 


 





 


Neutrophils (%) (Auto) 79 % (31-73)    


 


Lymphocytes (%) (Auto) 13 % (24-48)    


 


Monocytes (%) (Auto) 6 % (0-9)    


 


Eosinophils (%) (Auto) 2 % (0-3)    


 


Basophils (%) (Auto) 0 % (0-3)    


 


Neutrophils # (Auto)


 7.1 x10^3/uL


(1.8-7.7) 


 


 





 


Lymphocytes # (Auto)


 1.2 x10^3/uL


(1.0-4.8) 


 


 





 


Monocytes # (Auto)


 0.5 x10^3/uL


(0.0-1.1) 


 


 





 


Eosinophils # (Auto)


 0.2 x10^3/uL


(0.0-0.7) 


 


 





 


Basophils # (Auto)


 0.0 x10^3/uL


(0.0-0.2) 


 


 





 


Sodium Level


 143 mmol/L


(136-145) 


 


 





 


Potassium Level


 4.1 mmol/L


(3.5-5.1) 


 


 





 


Chloride Level


 103 mmol/L


() 


 


 





 


Carbon Dioxide Level


 38 mmol/L


(21-32) 


 


 





 


Anion Gap 2 (6-14)    


 


Blood Urea Nitrogen


 25 mg/dL


(7-20) 


 


 





 


Creatinine


 0.6 mg/dL


(0.6-1.0) 


 


 





 


Estimated GFR


(Cockcroft-Gault) 106.3 


 


 


 





 


Glucose Level


 115 mg/dL


(70-99) 


 


 





 


Calcium Level


 8.5 mg/dL


(8.5-10.1) 


 


 





 


Glucose (Fingerstick)


 


 110 mg/dL


(70-99) 208 mg/dL


(70-99) 130 mg/dL


(70-99)


 


Test


 5/16/20


01:15 5/16/20


05:54 


 





 


Glucose (Fingerstick)


 153 mg/dL


(70-99) 119 mg/dL


(70-99) 


 











Medications





Active Scripts








 Medications  Dose


 Route/Sig


 Max Daily Dose Days Date Category


 


 Bisoprolol


 Fumarate 5 Mg


 Tablet  10 Mg


 PO DAILY


   3/16/20 Reported








Comments


CXR  reviewed.





Impression


.


IMPRESSION:


1.  Acute hypoxemic respiratory failure secondary to ARDS status post trach,


2.  Gallstone pancreatitis


3.  Severe metabolic acidosis.stable


4.  Acute kidney injury-stable, Off HD-- continue to improve 


5.  Acute gallstone pancreatitis.


6.  Hypoalbuminemia.


7.  Moderate persistent effusions, s/p left thora  5/12


8.  Fever-  Per ID, per surgery--resolved 


9.  Chronic anemia


10. Covid 19 testing negative


11. Moderate to large ascites-S/P paracentisis


12.S/P paracentisis with 4 liters removed on 4/15/20


13. S/P IR drain placement on 5/8/2020





Plan


.


s/p  thoracentesis, 5/12, 3 litres removed


cap trach as tolerated


Follow surgery recs-- S/P 3 drain placed in IR on 5/8/2020


Follow ID recs for ABX


Follow nephrology recs 


Continue TPN 


DVT/GI PPX: heparin SQ/ protonix 


D/W RN and RT, pt





CODE:FULL











MAN BLAKE MD               May 16, 2020 06:24

## 2020-05-16 NOTE — NUR
Found patient with original ROBERT drain out in the bed. Site cleaned with alcohol and covered 
with a 4x4 and Mefix tape. All other drains intact and suction working.

## 2020-05-16 NOTE — PDOC
Infectious Disease Note


Subjective


Subjective


feeling better says occ nausea and min pain


Wants to drink,


states had a BM 


Walked yesterday


Still distended but less


off vent, trach o2





ROS


ROS


o/w neg





Vital Sign


Vital Signs





Vital Signs








  Date Time  Temp Pulse Resp B/P (MAP) Pulse Ox O2 Delivery O2 Flow Rate FiO2


 


5/16/20 06:00  85 14 81/44 (56) 99 Nasal Cannula 2.0 


 


5/16/20 04:00 98.3       





 98.3       











Physical Exam


PHYSICAL EXAM


GENERAL: NAD and alert


HEENT:  oral cavity dry, NGT


NECK:  Trach/vent - capped


LUNGS: Improved aeration has humidified O2 via trach shield


HEART:  S1, S2, regular 


ABDOMEN: Less Distended, hypoactive BS, tender, + drains x 3


: [place] (4/14)


EXTREMITIES: Generalized edema, no cyanosis, SCDs bilaterally 


SKIN: Warm and dry.  No generalized rash.  


CNS: Alert and answering questions


PICC(4/30) clean





Labs


Lab





Laboratory Tests








Test


 5/15/20


12:11 5/15/20


17:59 5/16/20


01:15 5/16/20


05:45


 


Glucose (Fingerstick)


 208 mg/dL


(70-99) 130 mg/dL


(70-99) 153 mg/dL


(70-99) 





 


White Blood Count


 


 


 


 11.3 x10^3/uL


(4.0-11.0)


 


Red Blood Count


 


 


 


 2.65 x10^6/uL


(3.50-5.40)


 


Hemoglobin


 


 


 


 7.5 g/dL


(12.0-15.5)


 


Hematocrit


 


 


 


 23.6 %


(36.0-47.0)


 


Mean Corpuscular Volume    89 fL () 


 


Mean Corpuscular Hemoglobin    28 pg (25-35) 


 


Mean Corpuscular Hemoglobin


Concent 


 


 


 32 g/dL


(31-37)


 


Red Cell Distribution Width


 


 


 


 18.1 %


(11.5-14.5)


 


Platelet Count


 


 


 


 524 x10^3/uL


(140-400)


 


Neutrophils (%) (Auto)    84 % (31-73) 


 


Lymphocytes (%) (Auto)    9 % (24-48) 


 


Monocytes (%) (Auto)    5 % (0-9) 


 


Eosinophils (%) (Auto)    1 % (0-3) 


 


Basophils (%) (Auto)    0 % (0-3) 


 


Neutrophils # (Auto)


 


 


 


 9.5 x10^3/uL


(1.8-7.7)


 


Lymphocytes # (Auto)


 


 


 


 1.0 x10^3/uL


(1.0-4.8)


 


Monocytes # (Auto)


 


 


 


 0.6 x10^3/uL


(0.0-1.1)


 


Eosinophils # (Auto)


 


 


 


 0.1 x10^3/uL


(0.0-0.7)


 


Basophils # (Auto)


 


 


 


 0.0 x10^3/uL


(0.0-0.2)


 


Test


 5/16/20


05:54 


 


 





 


Glucose (Fingerstick)


 119 mg/dL


(70-99) 


 


 











Micro


U/S 5/4





IMPRESSION: Complex fluid collections within the right and lower 


quadrants. The abdominal visceral and vascular structures are not formally


assessed on this exam.





CT Scan 5/4





IMPRESSION:


 





1. Findings consistent with sequelae of severe acute pancreatitis with 


enlarging peripancreatic and intra-abdominal fluid collections as 


described


 


2. Interval placement of a drain in the ventral abdominal wall with and 


partial evacuation of the ventral extraperitoneal fluid collection 


previously evident.











4/27


Operative Note:


After obtaining informed consent, patient was taken to OR, induced under GETA 

and prepped in the usual fashion.  5 mm port placed umbilical and right mid 

abdomen, all under laparoscopic guidance.  Large amount of ascites encountered 

and aspirated off.  Fluid was clear with some whitish debris.  Viscera was 

completely locked in with obliteration of all planes, preventing any significant

exploration.  Copious irrigation.





Given patient's overall clinical improvement, favor against open procedure and 

attempt at cholecystectomy and/or necrosectomy, given high risk of 

complications.  Cholecystectomy will need to be performed, but favor waiting 3 

months.





19 ROBERT drain placed and secured with 3 0 nylon.  Skin repaired with 4 0 monocryl.

 Dressing placed.





Patient tolerated procedure well and sent to PACU in stable condition.  All 

counts correct.  Wound class is 4.
































CT Chest Abd/pelv 4/14


CT CHEST:


CT scan the chest was done without contrast. There is a tracheostomy tube 


in good position. There are large bilateral pleural effusions. There is 


atelectasis in both lung bases. There is no mediastinal adenopathy. Right 


arm PICC line extends to the superior vena cava. There is a temporary 


dialysis catheter extending to the vena cava near the atrium.


 


IMPRESSION:


1. Large bilateral effusions.


2. Atelectasis of both lungs.


 


End impression


 


CT abdomen pelvis


 


CT scan the abdomen pelvis was done following CT chest with contrast. NG 


tube extends into the stomach. Spleen is normal. There is no mass or 


hydronephrosis in the kidneys. There is a gallstone the gallbladder. A 


focal liver lesion is not identified. There is diffuse necrosis of the 


pancreas. There is peripancreatic fluid. The pattern is similar to the 


recent study. There is fluid in the paracolic gutters. There is 


retroperitoneal fluid surrounding the kidneys. Ascites extends into the 


pelvis. Ascites is similar to the prior study. There is no bowel 


obstruction. There is no free air.


 


IMPRESSION:


1. Diffuse pancreatic necrosis.


2. A extensive retroperitoneal fluid and inflammation.


3. Cholelithiasis.


4. Moderate to large volume ascites with little change.



































CT A/P, 4/9


IMPRESSION:


1. Increased ascites.


2. 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


at the pancreas


3. Cholelithiasis with thickening of the gallbladder wall.


4. Persistent pleural effusions and atelectasis in the lung bases.





Objective


Assessment


Mild Leukocytosis but clinically looks well


Fever  - better currently - intermittent could be from underlying pancreatitis 

blood cults 5/4 - neg so far


? Ileus with vomiting


Abd distention - U/S and CT reviewed s/p 0.4 L of opaque, debris-containing 

ascites was removed 5/6


S/p thoracenteis 5/12


Acute pancreatitis with persistent  necrosis


CT a/p 4/9


    Increased ascites. Persistent evidence of necrotizing pancreatitis with 

fluid and phlegmon


   at the pancreas


   4/27 status post ROBERT drain placement - C paropsilosis on cult and 5/6; Cult 

line tip 4/30 - neg


Anemia - S/p PRBCs


Cholelithiasis with thickening of the gallbladder wall.


Leucocytosis improving


JED,Hyperkalemia, Metabolic acidosis off dialysis


Acute hypoxic resp failure ,bilateral pleural effusion and atelectasis


hypocalcemia 


Prediabetes


HTN


s/p trach





Plan


Plan of Care


Cont merrem 4/8 - wean soon


Continue micafungin


Labs in am


f/u cultures 


Maintain aspiration precaution


Supportive care











RASHAWN ROSEN MD              May 16, 2020 07:40

## 2020-05-16 NOTE — NUR
Pharmacy TPN Dosing Note



S: JESENIA RICH is a 49 year old F Currently receiving Central Continuous TPN started 
03/18/20



B:Pertinent PMH: Necrotizing pancreatitis

Current diet: NPO 



LABS:

Sodium:    143 

Potassium: 4.1 

Chloride:  103 

Calcium:   8.5 

Corrected Calcium: 10.26 

Magnesium: 1.9 

CO2:       38 

SCr:       0.6 

Glucose:   119-208 

Albumin:   1.8 

AST:       50 

ALT:       50 



TPN FORMULA:

TPN TYPE:  Central Continuous

AMINO ACIDS:         90 gm

DEXTROSE:            250 gm

LIPIDS:              30 gm

SODIUM CHLORIDE:     - mEq

SODIUM ACETATE:      - mEq

SODIUM PHOSPHATE:    - mmol

POTASSIUM CHLORIDE:  90 mEq

POTASSIUM ACETATE:   - mEq

POTASSIUM PHOSPHATE: - mmol

MAGNESIUM:           20 mEq

CALCIUM:             - mEq

INSULIN:             15 units

MULTIPLE VITAMIN:    10 ml

TRACE ELEMENTS:      0.5 ml(s)



TPN PLAN:  5/16 CONT SAME TPN





R: Continue TPN 

Will monitor electrolytes, glucose, and tolerance to TPN.



 MURPHY ACOSTA [facility], 05/16/20 1004

## 2020-05-16 NOTE — PDOC
PROGRESS NOTES


Subjective


Subjective


pt resting quietly, awakens but appears tired





Objective


Objective





Vital Signs








  Date Time  Temp Pulse Resp B/P (MAP) Pulse Ox O2 Delivery O2 Flow Rate FiO2


 


5/16/20 08:00  97 18 116/58 (77) 99 Nasal Cannula 2.0 


 


5/16/20 04:00 98.3       





 98.3       














Intake and Output 


 


 5/16/20





 07:00


 


Intake Total 1252 ml


 


Output Total 4605 ml


 


Balance -3353 ml


 


 


 


Intake Oral 0 ml


 


IV Total 1252 ml


 


Output Urine Total 3910 ml


 


Gastric Drainage Total 150 ml


 


Drainage Total 545 ml











Physical Exam


Physical Exam


abdomen mildly distended, drains in place, doesn't appear tender





Assessment


Assessment


Problems


Medical Problems:


(1) Acute pancreatitis


Status: Acute  





(2) Cholelithiasis


Status: Acute  











Plan


Plan of Care


Supportive care, no new surgical plans





Comment


Review of Relevant


I have reviewed the following items josy (where applicable) has been applied.


Labs





Laboratory Tests








Test


 5/14/20


11:37 5/14/20


18:00 5/15/20


00:49 5/15/20


06:40


 


Glucose (Fingerstick)


 200 mg/dL


(70-99) 174 mg/dL


(70-99) 152 mg/dL


(70-99) 





 


White Blood Count


 


 


 


 9.0 x10^3/uL


(4.0-11.0)


 


Red Blood Count


 


 


 


 2.56 x10^6/uL


(3.50-5.40)


 


Hemoglobin


 


 


 


 7.2 g/dL


(12.0-15.5)


 


Hematocrit


 


 


 


 22.8 %


(36.0-47.0)


 


Mean Corpuscular Volume    89 fL () 


 


Mean Corpuscular Hemoglobin    28 pg (25-35) 


 


Mean Corpuscular Hemoglobin


Concent 


 


 


 32 g/dL


(31-37)


 


Red Cell Distribution Width


 


 


 


 17.9 %


(11.5-14.5)


 


Platelet Count


 


 


 


 489 x10^3/uL


(140-400)


 


Neutrophils (%) (Auto)    79 % (31-73) 


 


Lymphocytes (%) (Auto)    13 % (24-48) 


 


Monocytes (%) (Auto)    6 % (0-9) 


 


Eosinophils (%) (Auto)    2 % (0-3) 


 


Basophils (%) (Auto)    0 % (0-3) 


 


Neutrophils # (Auto)


 


 


 


 7.1 x10^3/uL


(1.8-7.7)


 


Lymphocytes # (Auto)


 


 


 


 1.2 x10^3/uL


(1.0-4.8)


 


Monocytes # (Auto)


 


 


 


 0.5 x10^3/uL


(0.0-1.1)


 


Eosinophils # (Auto)


 


 


 


 0.2 x10^3/uL


(0.0-0.7)


 


Basophils # (Auto)


 


 


 


 0.0 x10^3/uL


(0.0-0.2)


 


Sodium Level


 


 


 


 143 mmol/L


(136-145)


 


Potassium Level


 


 


 


 4.1 mmol/L


(3.5-5.1)


 


Chloride Level


 


 


 


 103 mmol/L


()


 


Carbon Dioxide Level


 


 


 


 38 mmol/L


(21-32)


 


Anion Gap    2 (6-14) 


 


Blood Urea Nitrogen


 


 


 


 25 mg/dL


(7-20)


 


Creatinine


 


 


 


 0.6 mg/dL


(0.6-1.0)


 


Estimated GFR


(Cockcroft-Gault) 


 


 


 106.3 





 


Glucose Level


 


 


 


 115 mg/dL


(70-99)


 


Calcium Level


 


 


 


 8.5 mg/dL


(8.5-10.1)


 


Test


 5/15/20


06:43 5/15/20


12:11 5/15/20


17:59 5/16/20


01:15


 


Glucose (Fingerstick)


 110 mg/dL


(70-99) 208 mg/dL


(70-99) 130 mg/dL


(70-99) 153 mg/dL


(70-99)


 


Test


 5/16/20


05:45 5/16/20


05:54 


 





 


White Blood Count


 11.3 x10^3/uL


(4.0-11.0) 


 


 





 


Red Blood Count


 2.65 x10^6/uL


(3.50-5.40) 


 


 





 


Hemoglobin


 7.5 g/dL


(12.0-15.5) 


 


 





 


Hematocrit


 23.6 %


(36.0-47.0) 


 


 





 


Mean Corpuscular Volume 89 fL ()    


 


Mean Corpuscular Hemoglobin 28 pg (25-35)    


 


Mean Corpuscular Hemoglobin


Concent 32 g/dL


(31-37) 


 


 





 


Red Cell Distribution Width


 18.1 %


(11.5-14.5) 


 


 





 


Platelet Count


 524 x10^3/uL


(140-400) 


 


 





 


Neutrophils (%) (Auto) 84 % (31-73)    


 


Lymphocytes (%) (Auto) 9 % (24-48)    


 


Monocytes (%) (Auto) 5 % (0-9)    


 


Eosinophils (%) (Auto) 1 % (0-3)    


 


Basophils (%) (Auto) 0 % (0-3)    


 


Neutrophils # (Auto)


 9.5 x10^3/uL


(1.8-7.7) 


 


 





 


Lymphocytes # (Auto)


 1.0 x10^3/uL


(1.0-4.8) 


 


 





 


Monocytes # (Auto)


 0.6 x10^3/uL


(0.0-1.1) 


 


 





 


Eosinophils # (Auto)


 0.1 x10^3/uL


(0.0-0.7) 


 


 





 


Basophils # (Auto)


 0.0 x10^3/uL


(0.0-0.2) 


 


 





 


Glucose (Fingerstick)


 


 119 mg/dL


(70-99) 


 











Laboratory Tests








Test


 5/15/20


12:11 5/15/20


17:59 5/16/20


01:15 5/16/20


05:45


 


Glucose (Fingerstick)


 208 mg/dL


(70-99) 130 mg/dL


(70-99) 153 mg/dL


(70-99) 





 


White Blood Count


 


 


 


 11.3 x10^3/uL


(4.0-11.0)


 


Red Blood Count


 


 


 


 2.65 x10^6/uL


(3.50-5.40)


 


Hemoglobin


 


 


 


 7.5 g/dL


(12.0-15.5)


 


Hematocrit


 


 


 


 23.6 %


(36.0-47.0)


 


Mean Corpuscular Volume    89 fL () 


 


Mean Corpuscular Hemoglobin    28 pg (25-35) 


 


Mean Corpuscular Hemoglobin


Concent 


 


 


 32 g/dL


(31-37)


 


Red Cell Distribution Width


 


 


 


 18.1 %


(11.5-14.5)


 


Platelet Count


 


 


 


 524 x10^3/uL


(140-400)


 


Neutrophils (%) (Auto)    84 % (31-73) 


 


Lymphocytes (%) (Auto)    9 % (24-48) 


 


Monocytes (%) (Auto)    5 % (0-9) 


 


Eosinophils (%) (Auto)    1 % (0-3) 


 


Basophils (%) (Auto)    0 % (0-3) 


 


Neutrophils # (Auto)


 


 


 


 9.5 x10^3/uL


(1.8-7.7)


 


Lymphocytes # (Auto)


 


 


 


 1.0 x10^3/uL


(1.0-4.8)


 


Monocytes # (Auto)


 


 


 


 0.6 x10^3/uL


(0.0-1.1)


 


Eosinophils # (Auto)


 


 


 


 0.1 x10^3/uL


(0.0-0.7)


 


Basophils # (Auto)


 


 


 


 0.0 x10^3/uL


(0.0-0.2)


 


Test


 5/16/20


05:54 


 


 





 


Glucose (Fingerstick)


 119 mg/dL


(70-99) 


 


 











Microbiology


5/6/20 Fungal Culture - Final, Complete


         


5/6/20 Fungal Culture Result 1 - Final, Complete


         


5/4/20 Blood Culture - Final, Complete


         NO GROWTH AFTER 5 DAYS


4/30/20 Aerobic Culture - Final, Complete


          


4/30/20 Aerobic Culture Result 1 ([facility]) - Final, Complete


          


4/30/20 Gram Stain - Final, Complete


          


4/30/20 Gram Stain Result 1 ([facility]) - Final, Complete


          


4/30/20 Gram Stain Result 2 ([facility]) - Final, Complete


          


4/12/20 Urine Culture - Final, Complete


          


4/12/20 Urine Culture Result 1 ([facility]) - Final, Complete


Medications





Current Medications


Sodium Chloride 1,000 ml @  1,000 mls/hr Q1H IV  Last administered on 3/16/20at 

03:00;  Start 3/16/20 at 03:00;  Stop 3/16/20 at 03:59;  Status DC


Ondansetron HCl (Zofran) 4 mg 1X  ONCE IVP  Last administered on 3/16/20at 

03:27;  Start 3/16/20 at 03:00;  Stop 3/16/20 at 03:01;  Status DC


Morphine Sulfate (Morphine Sulfate) 4 mg 1X  ONCE IV ;  Start 3/16/20 at 03:00; 

Stop 3/16/20 at 03:01;  Status Cancel


Ketorolac Tromethamine (Toradol 30mg Vial) 30 mg 1X  ONCE IV  Last administered 

on 3/16/20at 02:54;  Start 3/16/20 at 03:00;  Stop 3/16/20 at 03:01;  Status DC


Fentanyl Citrate (Fentanyl 2ml Vial) 25 mcg 1X  ONCE IVP  Last administered on 

3/16/20at 03:23;  Start 3/16/20 at 03:30;  Stop 3/16/20 at 03:31;  Status DC


Fentanyl Citrate (Fentanyl 2ml Vial) 100 mcg STK-MED ONCE .ROUTE ;  Start 

3/16/20 at 03:18;  Stop 3/16/20 at 03:18;  Status DC


Iohexol (Omnipaque 350 Mg/ml) 90 ml 1X  ONCE IV  Last administered on 3/16/20at 

03:25;  Start 3/16/20 at 03:30;  Stop 3/16/20 at 03:31;  Status DC


Info (CONTRAST GIVEN -- Rx MONITORING) 1 each PRN DAILY  PRN MC SEE COMMENTS;  

Start 3/16/20 at 03:30;  Stop 3/18/20 at 03:29;  Status DC


Hydromorphone HCl (Dilaudid) 0.5 mg 1X  ONCE IV  Last administered on 3/16/20at 

03:55;  Start 3/16/20 at 04:30;  Stop 3/16/20 at 04:32;  Status DC


Ondansetron HCl (Zofran) 4 mg PRN Q8HRS  PRN IV NAUSEA/VOMITING 1ST CHOICE;  

Start 3/16/20 at 05:00;  Stop 3/16/20 at 09:27;  Status DC


Morphine Sulfate (Morphine Sulfate) 2 mg PRN Q2HR  PRN IV SEVERE PAIN 7-10 Last 

administered on 3/17/20at 12:26;  Start 3/16/20 at 05:00;  Stop 3/17/20 at 

14:15;  Status DC


Sodium Chloride 1,000 ml @  125 mls/hr Q8H IV  Last administered on 3/16/20at 

20:56;  Start 3/16/20 at 05:00;  Stop 3/17/20 at 04:59;  Status DC


Hydromorphone HCl (Dilaudid) 0.5 mg PRN Q3HRS  PRN IV SEVERE PAIN 7-10 Last ad

ministered on 3/17/20at 10:06;  Start 3/16/20 at 05:00;  Stop 3/17/20 at 12:01; 

Status DC


Piperacillin Sod/ Tazobactam Sod 4.5 gm/Sodium Chloride 100 ml @  200 mls/hr 1X 

ONCE IV  Last administered on 3/16/20at 05:44;  Start 3/16/20 at 06:00;  Stop 

3/16/20 at 06:29;  Status DC


Ondansetron HCl (Zofran) 4 mg PRN Q4HRS  PRN IV NAUSEA/VOMITING 1ST CHOICE Last 

administered on 5/10/20at 17:09;  Start 3/16/20 at 09:30


Insulin Human Lispro (HumaLOG) 0-9 UNITS Q6HRS SQ  Last administered on 

5/16/20at 01:19;  Start 3/16/20 at 09:30


Dextrose (Dextrose 50%-Water Syringe) 12.5 gm PRN Q15MIN  PRN IV SEE COMMENTS;  

Start 3/16/20 at 09:30


Pantoprazole Sodium (PROTONIX VIAL for IV PUSH) 40 mg DAILYAC IVP  Last 

administered on 5/16/20at 08:56;  Start 3/16/20 at 11:30


Prochlorperazine Edisylate (Compazine) 10 mg PRN Q6HRS  PRN IV NAUSEA/VOMITING, 

2nd CHOICE Last administered on 5/14/20at 06:11;  Start 3/16/20 at 17:45


Atenolol (Tenormin) 100 mg DAILY PO ;  Start 3/17/20 at 09:00;  Stop 3/16/20 at 

20:08;  Status DC


Metoprolol Tartrate (Lopressor Vial) 2.5 mg Q6HRS IVP  Last administered on 

3/17/20at 05:51;  Start 3/16/20 at 20:15;  Stop 3/17/20 at 10:02;  Status DC


Metoprolol Tartrate (Lopressor Vial) 5 mg Q6HRS IVP  Last administered on 

3/26/20at 00:12;  Start 3/17/20 at 10:15;  Stop 3/28/20 at 08:48;  Status DC


Hydromorphone HCl (Dilaudid) 1 mg PRN Q3HRS  PRN IV SEVERE PAIN 7-10 Last 

administered on 3/23/20at 05:13;  Start 3/17/20 at 12:00;  Stop 3/31/20 at 

00:25;  Status DC


Lidocaine HCl (Buffered Lidocaine 1%) 3 ml STK-MED ONCE .ROUTE ;  Start 3/17/20 

at 12:55;  Stop 3/17/20 at 12:56;  Status DC


Albumin Human 500 ml @  125 mls/hr 1X  ONCE IV  Last administered on 3/17/20at 

14:33;  Start 3/17/20 at 14:30;  Stop 3/17/20 at 18:32;  Status DC


Norepinephrine Bitartrate 8 mg/ Dextrose 258 ml @  17.299 mls/ hr CONT  PRN IV 

PER PROTOCOL Last administered on 4/14/20at 12:48;  Start 3/17/20 at 15:30;  

Stop 4/17/20 at 09:19;  Status DC


Sodium Chloride 1,000 ml @  125 mls/hr Q8H IV  Last administered on 3/17/20at 

21:04;  Start 3/17/20 at 16:00;  Stop 3/18/20 at 02:42;  Status DC


Albumin Human 500 ml @  125 mls/hr PRN BID  PRN IV After every 2L NSS & BP < 

90mm Last administered on 4/1/20at 14:21;  Start 3/17/20 at 16:00


Iohexol (Omnipaque 300 Mg/ml) 60 ml 1X  ONCE IV  Last administered on 3/17/20at 

17:20;  Start 3/17/20 at 17:00;  Stop 3/17/20 at 17:01;  Status DC


Info (CONTRAST GIVEN -- Rx MONITORING) 1 each PRN DAILY  PRN MC SEE COMMENTS;  

Start 3/17/20 at 17:00;  Stop 3/19/20 at 16:59;  Status DC


Meropenem 1 gm/ Sodium Chloride 100 ml @  200 mls/hr Q8HRS IV  Last administered

on 3/18/20at 05:45;  Start 3/17/20 at 20:00;  Stop 3/18/20 at 08:48;  Status DC


Furosemide (Lasix) 40 mg 1X  ONCE IVP  Last administered on 3/17/20at 22:12;  

Start 3/17/20 at 22:30;  Stop 3/17/20 at 22:31;  Status DC


Calcium Chloride 1000 mg/Sodium Chloride 110 ml @  220 mls/hr 1X  ONCE IV  Last 

administered on 3/17/20at 22:11;  Start 3/17/20 at 22:30;  Stop 3/17/20 at 

22:59;  Status DC


Albuterol Sulfate (Ventolin Neb Soln) 2.5 mg 1X  ONCE NEB  Last administered on 

3/18/20at 00:56;  Start 3/17/20 at 22:30;  Stop 3/17/20 at 22:31;  Status DC


Insulin Human Regular (HumuLIN R VIAL) 5 unit 1X  ONCE IV  Last administered on 

3/17/20at 22:14;  Start 3/17/20 at 22:30;  Stop 3/17/20 at 22:31;  Status DC


Magnesium Sulfate 50 ml @ 25 mls/hr 1X  ONCE IV  Last administered on 3/18/20at 

02:57;  Start 3/18/20 at 03:00;  Stop 3/18/20 at 04:59;  Status DC


Calcium Gluconate 1000 mg/Sodium Chloride 110 ml @  220 mls/hr 1X  ONCE IV  Last

administered on 3/18/20at 02:46;  Start 3/18/20 at 03:00;  Stop 3/18/20 at 

03:29;  Status DC


Sodium Chloride 1,000 ml @  200 mls/hr Q5H IV  Last administered on 3/18/20at 

02:46;  Start 3/18/20 at 03:00;  Stop 3/18/20 at 10:21;  Status DC


Calcium Gluconate 1000 mg/Sodium Chloride 110 ml @  220 mls/hr 1X  ONCE IV  Last

administered on 3/18/20at 03:21;  Start 3/18/20 at 03:30;  Stop 3/18/20 at 

03:59;  Status DC


Sodium Bicarbonate 50 meq/Sodium Chloride 1,050 ml @  75 mls/hr Q14H IV  Last 

administered on 3/22/20at 21:10;  Start 3/18/20 at 07:30;  Stop 3/23/20 at 1

0:28;  Status DC


Calcium Gluconate 2000 mg/Sodium Chloride 120 ml @  220 mls/hr 1X  ONCE IV  Last

administered on 3/18/20at 09:05;  Start 3/18/20 at 07:30;  Stop 3/18/20 at 

08:02;  Status DC


Lidocaine HCl (Xylocaine-Mpf 1% 2ml Vial) 2 ml STK-MED ONCE .ROUTE ;  Start 

3/18/20 at 08:47;  Stop 3/18/20 at 08:47;  Status DC


Meropenem 500 mg/ Sodium Chloride 50 ml @  100 mls/hr Q12HR IV  Last 

administered on 3/23/20at 21:01;  Start 3/18/20 at 18:00;  Stop 3/24/20 at 

07:58;  Status DC


Lidocaine HCl (Buffered Lidocaine 1%) 3 ml STK-MED ONCE .ROUTE ;  Start 3/18/20 

at 09:46;  Stop 3/18/20 at 09:46;  Status DC


Lidocaine HCl (Buffered Lidocaine 1%) 6 ml 1X  ONCE INJ  Last administered on 

3/18/20at 10:26;  Start 3/18/20 at 10:15;  Stop 3/18/20 at 10:16;  Status DC


Info (Tpn Per Pharmacy) 1 each PRN DAILY  PRN MC SEE COMMENTS Last administered 

on 5/15/20at 12:34;  Start 3/18/20 at 12:00


Sodium Chloride 1,000 ml @  1,000 mls/hr Q1H PRN IV hypotension;  Start 3/18/20 

at 12:07;  Stop 3/18/20 at 18:06;  Status DC


Diphenhydramine HCl (Benadryl) 25 mg 1X PRN  PRN IV ITCHING;  Start 3/18/20 at 

12:15;  Stop 3/19/20 at 12:14;  Status DC


Diphenhydramine HCl (Benadryl) 25 mg 1X PRN  PRN IV ITCHING;  Start 3/18/20 at 

12:15;  Stop 3/19/20 at 12:14;  Status DC


Sodium Chloride 1,000 ml @  400 mls/hr Q2H30M PRN IV PATENCY;  Start 3/18/20 at 

12:07;  Stop 3/19/20 at 00:06;  Status DC


Info (PHARMACY MONITORING -- do not chart) 1 each PRN DAILY  PRN MC SEE 

COMMENTS;  Start 3/18/20 at 12:15;  Stop 3/20/20 at 08:13;  Status DC


Sodium Chloride 90 meq/Calcium Gluconate 10 meq/ Multivitamins 10 ml/Chromium/ 

Copper/Manganese/ Seleni/Zn 1 ml/ Total Parenteral Nutrition/Amino A

cids/Dextrose/ Fat Emulsion Intravenous 55.005 ml  @ 2.292 mls/hr TPN  CONT IV ;

 Start 3/18/20 at 22:00;  Stop 3/18/20 at 12:33;  Status DC


Info (Tpn Per Pharmacy) 1 each PRN DAILY  PRN MC SEE COMMENTS;  Start 3/18/20 at

12:30;  Status UNV


Sodium Chloride 90 meq/Calcium Gluconate 10 meq/ Multivitamins 10 ml/Chromium/ 

Copper/Manganese/ Seleni/Zn 0.5 ml/ Total Parenteral Nutrition/Amino 

Acids/Dextrose/ Fat Emulsion Intravenous 1,512 ml @  63 mls/hr TPN  CONT IV  

Last administered on 3/18/20at 22:06;  Start 3/18/20 at 22:00;  Stop 3/19/20 at 

21:59;  Status DC


Calcium Carbonate/ Glycine (Tums) 500 mg PRN AFTMEALHC  PRN PO INDIGESTION;  

Start 3/18/20 at 17:45;  Stop 5/13/20 at 10:25;  Status DC


Calcium Gluconate (Calcium Gluconate) 2,000 mg 1X  ONCE IVP  Last administered 

on 3/19/20at 02:19;  Start 3/19/20 at 02:15;  Stop 3/19/20 at 02:16;  Status DC


Calcium Chloride 3000 mg/Sodium Chloride 1,030 ml @  50 mls/hr [id number] IV  Last 

administered on 3/21/20at 02:17;  Start 3/19/20 at 08:00;  Stop 3/21/20 at 

15:23;  Status DC


Lorazepam (Ativan Inj) 1 mg PRN Q4HRS  PRN IVP ANXIETY / AGITATION, 2nd choic 

Last administered on 4/17/20at 03:51;  Start 3/19/20 at 09:00;  Stop 4/17/20 at 

09:19;  Status DC


Sodium Chloride 1,000 ml @  1,000 mls/hr Q1H PRN IV hypotension;  Start 3/19/20 

at 08:56;  Stop 3/19/20 at 14:55;  Status DC


Albumin Human 200 ml @  200 mls/hr 1X PRN  PRN IV Hypotension;  Start 3/19/20 at

09:00;  Stop 3/19/20 at 14:59;  Status DC


Diphenhydramine HCl (Benadryl) 25 mg 1X PRN  PRN IV ITCHING;  Start 3/19/20 at 0

9:00;  Stop 3/20/20 at 08:59;  Status DC


Diphenhydramine HCl (Benadryl) 25 mg 1X PRN  PRN IV ITCHING;  Start 3/19/20 at 

09:00;  Stop 3/20/20 at 08:59;  Status DC


Sodium Chloride 1,000 ml @  400 mls/hr Q2H30M PRN IV PATENCY;  Start 3/19/20 at 

08:56;  Stop 3/19/20 at 20:55;  Status DC


Info (PHARMACY MONITORING -- do not chart) 1 each PRN DAILY  PRN MC SEE 

COMMENTS;  Start 3/19/20 at 09:00;  Status UNV


Info (PHARMACY MONITORING -- do not chart) 1 each PRN DAILY  PRN MC SEE 

COMMENTS;  Start 3/19/20 at 09:00;  Stop 3/20/20 at 08:13;  Status DC


Digoxin (Lanoxin) 500 mcg 1X  ONCE IV  Last administered on 3/19/20at 10:04;  

Start 3/19/20 at 10:00;  Stop 3/19/20 at 10:01;  Status DC


Digoxin (Lanoxin) 125 mcg 1X  ONCE IV  Last administered on 3/19/20at 17:10;  

Start 3/19/20 at 18:00;  Stop 3/19/20 at 18:01;  Status DC


Magnesium Sulfate 100 ml @  25 mls/hr 1X  ONCE IV  Last administered on 3/19/

20at 12:48;  Start 3/19/20 at 13:00;  Stop 3/19/20 at 16:59;  Status DC


Sodium Chloride 90 meq/Magnesium Sulfate 10 meq/ Calcium Gluconate 20 meq/ 

Multivitamins 10 ml/Chromium/ Copper/Manganese/ Seleni/Zn 0.5 ml/ Total 

Parenteral Nutrition/Amino Acids/Dextrose/ Fat Emulsion Intravenous 1,512 ml @  

63 mls/hr TPN  CONT IV  Last administered on 3/19/20at 22:25;  Start 3/19/20 at 

22:00;  Stop 3/20/20 at 21:59;  Status DC


Sodium Chloride 1,000 ml @  1,000 mls/hr Q1H PRN IV hypotension;  Start 3/20/20 

at 08:05;  Stop 3/20/20 at 14:04;  Status DC


Albumin Human 200 ml @  200 mls/hr 1X  ONCE IV  Last administered on 3/20/20at 

08:57;  Start 3/20/20 at 08:15;  Stop 3/20/20 at 09:14;  Status DC


Diphenhydramine HCl (Benadryl) 25 mg 1X PRN  PRN IV ITCHING;  Start 3/20/20 at 

08:15;  Stop 3/21/20 at 08:14;  Status DC


Diphenhydramine HCl (Benadryl) 25 mg 1X PRN  PRN IV ITCHING;  Start 3/20/20 at 

08:15;  Stop 3/21/20 at 08:14;  Status DC


Sodium Chloride 1,000 ml @  400 mls/hr Q2H30M PRN IV PATENCY;  Start 3/20/20 at 

08:05;  Stop 3/20/20 at 20:04;  Status DC


Info (PHARMACY MONITORING -- do not chart) 1 each PRN DAILY  PRN MC SEE 

COMMENTS;  Start 3/20/20 at 08:15;  Stop 3/24/20 at 07:57;  Status DC


Sodium Chloride 90 meq/Potassium Chloride 15 meq/ Potassium Phosphate 10 mmol/ 

Magnesium Sulfate 10 meq/Calcium Gluconate 20 meq/ Multivitamins 10 ml/Chromium/

Copper/Manganese/ Seleni/Zn 0.5 ml/ Total Parenteral Nutrition/Amino 

Acids/Dextrose/ Fat Emulsion Intravenous 1,512 ml @  63 mls/hr TPN  CONT IV  

Last administered on 3/20/20at 21:01;  Start 3/20/20 at 22:00;  Stop 3/21/20 at 

21:59;  Status DC


Potassium Chloride/Water 100 ml @  100 mls/hr 1X  ONCE IV  Last administered on 

3/20/20at 14:09;  Start 3/20/20 at 14:00;  Stop 3/20/20 at 14:59;  Status DC


Benzocaine (Hurricaine One) 1 spray 1X  ONCE MM  Last administered on 3/20/20at 

16:38;  Start 3/20/20 at 14:30;  Stop 3/20/20 at 14:31;  Status DC


Lidocaine HCl (Glydo (Lidocaine) Jelly) 1 thomas 1X  ONCE MM  Last administered on 

3/20/20at 16:38;  Start 3/20/20 at 14:30;  Stop 3/20/20 at 14:31;  Status DC


Linezolid/Dextrose 300 ml @  300 mls/hr Q12HR IV  Last administered on 3/26/20at

21:04;  Start 3/20/20 at 20:00;  Stop 3/27/20 at 07:50;  Status DC


Acetaminophen (Tylenol) 650 mg PRN Q6HRS  PRN PO MILD PAIN / TEMP;  Start 

3/21/20 at 03:30;  Stop 3/21/20 at 03:36;  Status DC


Acetaminophen (Tylenol) 650 mg PRN Q6HRS  PRN PEG MILD PAIN / TEMP Last 

administered on 4/16/20at 19:56;  Start 3/21/20 at 03:36;  Stop 5/13/20 at 

10:25;  Status DC


Sodium Chloride 1,000 ml @  1,000 mls/hr Q1H PRN IV hypotension;  Start 3/21/20 

at 07:50;  Stop 3/21/20 at 13:49;  Status DC


Albumin Human 200 ml @  200 mls/hr 1X PRN  PRN IV Hypotension;  Start 3/21/20 at

08:00;  Stop 3/21/20 at 13:59;  Status DC


Sodium Chloride (Normal Saline Flush) 10 ml 1X PRN  PRN IV AP catheter pack;  

Start 3/21/20 at 08:00;  Stop 3/22/20 at 07:59;  Status DC


Sodium Chloride (Normal Saline Flush) 10 ml 1X PRN  PRN IV  catheter pack;  

Start 3/21/20 at 08:00;  Stop 3/22/20 at 07:59;  Status DC


Sodium Chloride 1,000 ml @  400 mls/hr Q2H30M PRN IV PATENCY;  Start 3/21/20 at 

07:50;  Stop 3/21/20 at 19:49;  Status DC


Info (PHARMACY MONITORING -- do not chart) 1 each PRN DAILY  PRN MC SEE 

COMMENTS;  Start 3/21/20 at 08:00;  Status UNV


Info (PHARMACY MONITORING -- do not chart) 1 each PRN DAILY  PRN MC SEE 

COMMENTS;  Start 3/21/20 at 08:00;  Stop 3/23/20 at 08:25;  Status DC


Sodium Chloride 90 meq/Potassium Chloride 15 meq/ Potassium Phosphate 10 mmol/ 

Magnesium Sulfate 10 meq/Calcium Gluconate 20 meq/ Multivitamins 10 ml/Chromium/

Copper/Manganese/ Seleni/Zn 0.5 ml/ Total Parenteral Nutrition/Amino 

Acids/Dextrose/ Fat Emulsion Intravenous 1,512 ml @  63 mls/hr TPN  CONT IV  

Last administered on 3/21/20at 20:57;  Start 3/21/20 at 22:00;  Stop 3/22/20 at 

21:59;  Status DC


Sodium Chloride 90 meq/Potassium Chloride 15 meq/ Potassium Phosphate 15 mmol/ 

Magnesium Sulfate 10 meq/Calcium Gluconate 20 meq/ Multivitamins 10 ml/Chromium/

Copper/Manganese/ Seleni/Zn 0.5 ml/ Total Parenteral Nutrition/Amino 

Acids/Dextrose/ Fat Emulsion Intravenous 1,512 ml @  63 mls/hr TPN  CONT IV ;  

Start 3/22/20 at 22:00;  Stop 3/22/20 at 14:16;  Status DC


Sodium Chloride 90 meq/Potassium Chloride 15 meq/ Potassium Phosphate 15 mmol/ 

Magnesium Sulfate 10 meq/Calcium Gluconate 20 meq/ Multivitamins 10 ml/Chromium/

Copper/Manganese/ Seleni/Zn 0.5 ml/ Total Parenteral Nutrition/Amino 

Acids/Dextrose/ Fat Emulsion Intravenous 1,200 ml @  50 mls/hr TPN  CONT IV ;  

Start 3/22/20 at 22:00;  Stop 3/22/20 at 14:17;  Status DC


Sodium Chloride 90 meq/Potassium Chloride 15 meq/ Potassium Phosphate 10 mmol/ 

Magnesium Sulfate 10 meq/Calcium Gluconate 20 meq/ Multivitamins 10 ml/Chromium/

Copper/Manganese/ Seleni/Zn 0.5 ml/ Total Parenteral Nutrition/Amino 

Acids/Dextrose/ Fat Emulsion Intravenous 1,200 ml @  50 mls/hr TPN  CONT IV  

Last administered on 3/22/20at 23:29;  Start 3/22/20 at 22:00;  Stop 3/23/20 at 

21:59;  Status DC


Sodium Chloride 1,000 ml @  1,000 mls/hr Q1H PRN IV hypotension;  Start 3/23/20 

at 07:28;  Stop 3/23/20 at 13:27;  Status DC


Albumin Human 200 ml @  200 mls/hr 1X  ONCE IV  Last administered on 3/23/20at 

08:51;  Start 3/23/20 at 07:30;  Stop 3/23/20 at 08:29;  Status DC


Diphenhydramine HCl (Benadryl) 25 mg 1X PRN  PRN IV ITCHING;  Start 3/23/20 at 

07:30;  Stop 3/24/20 at 07:29;  Status DC


Diphenhydramine HCl (Benadryl) 25 mg 1X PRN  PRN IV ITCHING;  Start 3/23/20 at 

07:30;  Stop 3/24/20 at 07:29;  Status DC


Sodium Chloride 1,000 ml @  400 mls/hr Q2H30M PRN IV PATENCY;  Start 3/23/20 at 

07:28;  Stop 3/23/20 at 19:27;  Status DC


Info (PHARMACY MONITORING -- do not chart) 1 each PRN DAILY  PRN MC SEE 

COMMENTS;  Start 3/23/20 at 07:30;  Stop 4/3/20 at 13:01;  Status DC


Metronidazole 100 ml @  100 mls/hr Q6HRS IV  Last administered on 4/8/20at 

06:26;  Start 3/23/20 at 08:30;  Stop 4/8/20 at 09:58;  Status DC


Micafungin Sodium 100 mg/Dextrose 100 ml @  100 mls/hr Q24H IV  Last 

administered on 4/30/20at 08:18;  Start 3/23/20 at 09:00;  Stop 4/30/20 at 20:58

;  Status DC


Propofol 0 ml @ As Directed STK-MED ONCE IV ;  Start 3/23/20 at 07:53;  Stop 

3/23/20 at 07:53;  Status DC


Etomidate (Amidate) 20 mg STK-MED ONCE IV ;  Start 3/23/20 at 07:53;  Stop 

3/23/20 at 07:54;  Status DC


Midazolam HCl (Versed) 5 mg STK-MED ONCE .ROUTE ;  Start 3/23/20 at 07:57;  Stop

3/23/20 at 07:57;  Status DC


Fentanyl Citrate 30 ml @ 0 mls/hr CONT  PRN IV SEE PROTOCOL Last administered on

4/17/20at 06:12;  Start 3/23/20 at 08:15;  Stop 4/17/20 at 09:19;  Status DC


Artificial Tears (Artificial Tears) 1 drop PRN Q1HR  PRN OU DRY EYE, 1st choice;

 Start 3/23/20 at 08:15;  Stop 4/29/20 at 05:31;  Status DC


Midazolam HCl 50 mg/Sodium Chloride 50 ml @ 0 mls/hr CONT  PRN IV SEE PROTOCOL 

Last administered on 3/26/20at 22:39;  Start 3/23/20 at 08:15;  Stop 3/28/20 at 

15:59;  Status DC


Etomidate (Amidate) 8 mg 1X  ONCE IV  Last administered on 3/23/20at 08:33;  

Start 3/23/20 at 08:30;  Stop 3/23/20 at 08:31;  Status DC


Succinylcholine Chloride (Anectine) 120 mg 1X  ONCE IV  Last administered on 3/

23/20at 08:34;  Start 3/23/20 at 08:30;  Stop 3/23/20 at 08:31;  Status DC


Midazolam HCl (Versed) 5 mg 1X  ONCE IV ;  Start 3/23/20 at 08:30;  Stop 3/23/20

at 08:31;  Status DC


Potassium Chloride 15 meq/ Bicarbonate Dialysis Soln w/ out KCl 5,007.5 ml  @ 1

,000 mls/ hr Q5H1M IV  Last administered on 3/24/20at 11:11;  Start 3/23/20 at 

12:00;  Stop 3/24/20 at 11:15;  Status DC


Potassium Chloride 15 meq/ Bicarbonate Dialysis Soln w/ out KCl 5,007.5 ml  @ 

1,000 mls/ hr Q5H1M IV  Last administered on 3/24/20at 11:12;  Start 3/23/20 at 

12:00;  Stop 3/24/20 at 11:17;  Status DC


Potassium Chloride 15 meq/ Bicarbonate Dialysis Soln w/ out KCl 5,007.5 ml  @ 

1,000 mls/ hr Q5H1M IV  Last administered on 3/24/20at 11:11;  Start 3/23/20 at 

12:00;  Stop 3/24/20 at 11:19;  Status DC


Sodium Chloride 90 meq/Potassium Chloride 15 meq/ Potassium Phosphate 10 mmol/ 

Magnesium Sulfate 10 meq/Calcium Gluconate 20 meq/ Multivitamins 10 ml/Chromium/

Copper/Manganese/ Seleni/Zn 0.5 ml/ Total Parenteral Nutrition/Amino 

Acids/Dextrose/ Fat Emulsion Intravenous 1,400 ml @  58.333 mls/ hr TPN  CONT IV

 Last administered on 3/23/20at 21:42;  Start 3/23/20 at 22:00;  Stop 3/24/20 at

21:59;  Status DC


Heparin Sodium (Porcine) (Heparin Sodium) 5,000 unit Q8HRS SQ  Last administered

on 3/28/20at 05:55;  Start 3/23/20 at 15:00;  Stop 3/28/20 at 13:28;  Status DC


Meropenem 500 mg/ Sodium Chloride 50 ml @  100 mls/hr Q6HRS IV  Last 

administered on 3/25/20at 06:00;  Start 3/24/20 at 09:00;  Stop 3/25/20 at 

07:29;  Status DC


Potassium Phosphate 20 mmol/ Sodium Chloride 106.6667 ml @  51.667 m... 1X  ONCE

IV  Last administered on 3/24/20at 11:22;  Start 3/24/20 at 10:15;  Stop 3/24/20

at 12:18;  Status DC


Acetaminophen (Tylenol Supp) 650 mg PRN Q6HRS  PRN CA MILD PAIN / TEMP > 100.3'F

Last administered on 5/5/20at 09:12;  Start 3/24/20 at 10:30


Potassium Chloride/Water 100 ml @  100 mls/hr Q1H IV  Last administered on 

3/24/20at 12:12;  Start 3/24/20 at 11:00;  Stop 3/24/20 at 12:59;  Status DC


Potassium Chloride 20 meq/ Bicarbonate Dialysis Soln w/ out KCl 5,010 ml @  

1,000 mls/hr Q5H1M IV  Last administered on 3/25/20at 08:48;  Start 3/24/20 at 

12:00;  Stop 3/25/20 at 13:03;  Status DC


Potassium Chloride 20 meq/ Bicarbonate Dialysis Soln w/ out KCl 5,010 ml @  

1,000 mls/hr Q5H1M IV  Last administered on 3/29/20at 14:52;  Start 3/24/20 at 

11:30;  Stop 3/29/20 at 19:59;  Status DC


Potassium Chloride 20 meq/ Bicarbonate Dialysis Soln w/ out KCl 5,010 ml @  

1,000 mls/hr Q5H1M IV  Last administered on 3/29/20at 14:53;  Start 3/24/20 at 

11:30;  Stop 3/29/20 at 19:59;  Status DC


Sodium Chloride 90 meq/Potassium Chloride 15 meq/ Potassium Phosphate 15 mmol/ 

Magnesium Sulfate 10 meq/Calcium Gluconate 15 meq/ Multivitamins 10 ml/Chromium/

Copper/Manganese/ Seleni/Zn 0.5 ml/ Total Parenteral Nutrition/Amino Acids/Dext

verenice/ Fat Emulsion Intravenous 1,400 ml @  58.333 mls/ hr TPN  CONT IV  Last 

administered on 3/24/20at 22:17;  Start 3/24/20 at 22:00;  Stop 3/25/20 at 

21:59;  Status DC


Cefepime HCl (Maxipime) 2 gm Q12HR IVP  Last administered on 4/7/20at 20:56;  

Start 3/25/20 at 09:00;  Stop 4/8/20 at 09:58;  Status DC


Daptomycin 500 mg/ Sodium Chloride 50 ml @  100 mls/hr Q48H IV  Last administe

red on 4/10/20at 09:57;  Start 3/25/20 at 08:30;  Stop 4/10/20 at 10:07;  Status

DC


Lidocaine HCl (Buffered Lidocaine 1%) 3 ml 1X  ONCE INJ  Last administered on 

3/25/20at 10:27;  Start 3/25/20 at 10:30;  Stop 3/25/20 at 10:31;  Status DC


Potassium Phosphate 20 mmol/ Sodium Chloride 106.6667 ml @  51.667 m... 1X  ONCE

IV  Last administered on 3/25/20at 12:51;  Start 3/25/20 at 13:00;  Stop 3/25/20

at 15:03;  Status DC


Sodium Chloride 90 meq/Potassium Chloride 15 meq/ Potassium Phosphate 18 mmol/ 

Magnesium Sulfate 8 meq/Calcium Gluconate 15 meq/ Multivitamins 10 ml/Chromium/ 

Copper/Manganese/ Seleni/Zn 0.5 ml/ Total Parenteral Nutrition/Amino 

Acids/Dextrose/ Fat Emulsion Intravenous 1,400 ml @  58.333 mls/ hr TPN  CONT IV

 Last administered on 3/25/20at 22:16;  Start 3/25/20 at 22:00;  Stop 3/26/20 at

21:59;  Status DC


Potassium Chloride 20 meq/ Bicarbonate Dialysis Soln w/ out KCl 5,010 ml @  

1,000 mls/hr Q5H1M IV  Last administered on 3/29/20at 14:54;  Start 3/25/20 at 

16:00;  Stop 3/29/20 at 19:59;  Status DC


Multi-Ingred Cream/Lotion/Oil/ Oint (Artificial Tears Eye Ointment) 1 thomas PRN 

Q1HR  PRN OU DRY EYE, 2nd choice Last administered on 4/13/20at 08:19;  Start 

3/25/20 at 17:30


Sodium Chloride 90 meq/Potassium Chloride 15 meq/ Potassium Phosphate 18 mmol/ M

agnesium Sulfate 8 meq/Calcium Gluconate 15 meq/ Multivitamins 10 ml/Chromium/ 

Copper/Manganese/ Seleni/Zn 0.5 ml/ Total Parenteral Nutrition/Amino 

Acids/Dextrose/ Fat Emulsion Intravenous 1,400 ml @  58.333 mls/ hr TPN  CONT IV

 Last administered on 3/26/20at 22:00;  Start 3/26/20 at 22:00;  Stop 3/27/20 at

21:59;  Status DC


Albumin Human 500 ml @  125 mls/hr 1X  ONCE IV ;  Start 3/26/20 at 14:15;  Stop 

3/26/20 at 18:14;  Status DC


Sodium Chloride 90 meq/Potassium Chloride 15 meq/ Potassium Phosphate 18 mmol/ 

Magnesium Sulfate 8 meq/Calcium Gluconate 15 meq/ Multivitamins 10 ml/Chromium/ 

Copper/Manganese/ Seleni/Zn 0.5 ml/ Insulin Human Regular 10 unit/ Total 

Parenteral Nutrition/Amino Acids/Dextrose/ Fat Emulsion Intravenous 1,400 ml @  

58.333 mls/ hr TPN  CONT IV  Last administered on 3/27/20at 21:43;  Start 

3/27/20 at 22:00;  Stop 3/28/20 at 21:59;  Status DC


Lidocaine HCl (Buffered Lidocaine 1%) 3 ml STK-MED ONCE .ROUTE ;  Start 3/25/20 

at 10:00;  Stop 3/27/20 at 13:57;  Status DC


Midazolam HCl 100 mg/Sodium Chloride 100 ml @ 7 mls/hr CONT  PRN IV SEE PROTOCOL

Last administered on 4/8/20at 15:35;  Start 3/28/20 at 16:00


Sodium Chloride 90 meq/Potassium Chloride 15 meq/ Potassium Phosphate 18 mmol/ 

Magnesium Sulfate 8 meq/Calcium Gluconate 15 meq/ Multivitamins 10 ml/Chromium/ 

Copper/Manganese/ Seleni/Zn 0.5 ml/ Insulin Human Regular 15 unit/ Total 

Parenteral Nutrition/Amino Acids/Dextrose/ Fat Emulsion Intravenous 1,400 ml @  

58.333 mls/ hr TPN  CONT IV  Last administered on 3/28/20at 20:34;  Start 

3/28/20 at 22:00;  Stop 3/29/20 at 21:59;  Status DC


Info ([facility] Electrolyte Protocol) 1 ea CONT PRN  PRN MC PER PROTOCOL;  Start 

3/29/20 at 13:15


Sodium Chloride 90 meq/Potassium Chloride 15 meq/ Potassium Phosphate 18 mmol/ 

Magnesium Sulfate 8 meq/Calcium Gluconate 15 meq/ Multivitamins 10 ml/Chromium/ 

Copper/Manganese/ Seleni/Zn 0.5 ml/ Insulin Human Regular 15 unit/ Total 

Parenteral Nutrition/Amino Acids/Dextrose/ Fat Emulsion Intravenous 1,400 ml @  

58.333 mls/ hr TPN  CONT IV  Last administered on 3/29/20at 22:05;  Start 

3/29/20 at 22:00;  Stop 3/30/20 at 21:59;  Status DC


Potassium Chloride 15 meq/ Bicarbonate Dialysis Soln w/ out KCl 5,007.5 ml  @ 

1,000 mls/ hr Q5H1M IV  Last administered on 4/1/20at 18:14;  Start 3/29/20 at 

20:00;  Stop 4/2/20 at 13:08;  Status DC


Potassium Chloride 15 meq/ Bicarbonate Dialysis Soln w/ out KCl 5,007.5 ml  @ 

1,000 mls/ hr Q5H1M IV  Last administered on 4/1/20at 18:14;  Start 3/29/20 at 

20:00;  Stop 4/2/20 at 13:08;  Status DC


Potassium Chloride 15 meq/ Bicarbonate Dialysis Soln w/ out KCl 5,007.5 ml  @ 

1,000 mls/ hr Q5H1M IV  Last administered on 4/1/20at 18:14;  Start 3/29/20 at 

20:00;  Stop 4/2/20 at 13:08;  Status DC


Iohexol (Omnipaque 240 Mg/ml) 30 ml 1X  ONCE PO  Last administered on 3/30/20at 

11:30;  Start 3/30/20 at 11:30;  Stop 3/30/20 at 11:33;  Status DC


Info (CONTRAST GIVEN -- Rx MONITORING) 1 each PRN DAILY  PRN MC SEE COMMENTS;  

Start 3/30/20 at 11:45;  Stop 4/1/20 at 11:44;  Status DC


Sodium Chloride 90 meq/Potassium Chloride 15 meq/ Potassium Phosphate 18 mmol/ 

Magnesium Sulfate 8 meq/Calcium Gluconate 15 meq/ Multivitamins 10 ml/Chromium/ 

Copper/Manganese/ Seleni/Zn 0.5 ml/ Insulin Human Regular 15 unit/ Total 

Parenteral Nutrition/Amino Acids/Dextrose/ Fat Emulsion Intravenous 1,400 ml @  

58.333 mls/ hr TPN  CONT IV  Last administered on 3/30/20at 21:47;  Start 

3/30/20 at 22:00;  Stop 3/31/20 at 21:59;  Status DC


Sodium Chloride 90 meq/Potassium Chloride 15 meq/ Potassium Phosphate 18 mmol/ 

Magnesium Sulfate 8 meq/Calcium Gluconate 15 meq/ Multivitamins 10 ml/Chromium/ 

Copper/Manganese/ Seleni/Zn 0.5 ml/ Insulin Human Regular 20 unit/ Total 

Parenteral Nutrition/Amino Acids/Dextrose/ Fat Emulsion Intravenous 1,400 ml @  

58.333 mls/ hr TPN  CONT IV  Last administered on 3/31/20at 21:36;  Start 

3/31/20 at 22:00;  Stop 4/1/20 at 21:59;  Status DC


Alteplase, Recombinant (Cathflo For Central Catheter Clearance) 1 mg 1X  ONCE 

INT CAT  Last administered on 3/31/20at 20:03;  Start 3/31/20 at 19:30;  Stop 

3/31/20 at 19:46;  Status DC


Alteplase, Recombinant (Cathflo For Central Catheter Clearance) 1 mg 1X  ONCE 

INT CAT  Last administered on 3/31/20at 22:05;  Start 3/31/20 at 22:00;  Stop 

3/31/20 at 22:01;  Status DC


Sodium Chloride 90 meq/Potassium Chloride 15 meq/ Potassium Phosphate 18 mmol/ 

Magnesium Sulfate 8 meq/Calcium Gluconate 15 meq/ Multivitamins 10 ml/Chromium/ 

Copper/Manganese/ Seleni/Zn 0.5 ml/ Insulin Human Regular 20 unit/ Total 

Parenteral Nutrition/Amino Acids/Dextrose/ Fat Emulsion Intravenous 1,400 ml @  

58.333 mls/ hr TPN  CONT IV  Last administered on 4/1/20at 21:30;  Start 4/1/20 

at 22:00;  Stop 4/2/20 at 21:59;  Status DC


Dexmedetomidine HCl 400 mcg/ Sodium Chloride 100 ml @ 0 mls/hr CONT  PRN IV 

ANXIETY / AGITATION Last administered on 5/16/20at 06:45;  Start 4/2/20 at 08:15


Sodium Chloride 500 ml @  500 mls/hr 1X PRN  PRN IV ELEVATED BP, SEE COMMENTS;  

Start 4/2/20 at 08:15


Atropine Sulfate (ATROPINE 0.5mg SYRINGE) 0.5 mg PRN Q5MIN  PRN IV SEE COMMENTS;

 Start 4/2/20 at 08:15


Furosemide (Lasix) 20 mg 1X  ONCE IVP  Last administered on 4/2/20at 08:19;  

Start 4/2/20 at 08:15;  Stop 4/2/20 at 08:16;  Status DC


Lidocaine HCl (Buffered Lidocaine 1%) 3 ml STK-MED ONCE .ROUTE ;  Start 4/2/20 

at 08:39;  Stop 4/2/20 at 08:39;  Status DC


Lidocaine HCl (Buffered Lidocaine 1%) 6 ml 1X  ONCE INJ  Last administered on 

4/2/20at 09:05;  Start 4/2/20 at 09:00;  Stop 4/2/20 at 09:06;  Status DC


Sodium Chloride 90 meq/Potassium Chloride 15 meq/ Potassium Phosphate 18 mmol/ 

Magnesium Sulfate 8 meq/Calcium Gluconate 15 meq/ Multivitamins 10 ml/Chromium/ 

Copper/Manganese/ Seleni/Zn 0.5 ml/ Insulin Human Regular 20 unit/ Total 

Parenteral Nutrition/Amino Acids/Dextrose/ Fat Emulsion Intravenous 1,400 ml @  

58.333 mls/ hr TPN  CONT IV  Last administered on 4/2/20at 22:45;  Start 4/2/20 

at 22:00;  Stop 4/3/20 at 21:59;  Status DC


Sodium Chloride 1,000 ml @  1,000 mls/hr Q1H PRN IV hypotension;  Start 4/3/20 

at 07:30;  Stop 4/3/20 at 13:29;  Status DC


Albumin Human 200 ml @  200 mls/hr 1X PRN  PRN IV Hypotension Last administered 

on 4/3/20at 09:36;  Start 4/3/20 at 07:30;  Stop 4/3/20 at 13:29;  Status DC


Sodium Chloride (Normal Saline Flush) 10 ml 1X PRN  PRN IV AP catheter pack;  

Start 4/3/20 at 07:30;  Stop 4/3/20 at 21:29;  Status DC


Sodium Chloride (Normal Saline Flush) 10 ml 1X PRN  PRN IV  catheter pack;  

Start 4/3/20 at 07:30;  Stop 4/4/20 at 07:29;  Status DC


Sodium Chloride 1,000 ml @  400 mls/hr Q2H30M PRN IV PATENCY;  Start 4/3/20 at 

07:30;  Stop 4/3/20 at 19:29;  Status DC


Info (PHARMACY MONITORING -- do not chart) 1 each PRN DAILY  PRN MC SEE 

COMMENTS;  Start 4/3/20 at 07:30;  Stop 4/3/20 at 13:02;  Status DC


Info (PHARMACY MONITORING -- do not chart) 1 each PRN DAILY  PRN MC SEE 

COMMENTS;  Start 4/3/20 at 07:30;  Stop 4/5/20 at 12:45;  Status DC


Sodium Chloride 90 meq/Potassium Chloride 15 meq/ Potassium Phosphate 10 mmol/ M

agnesium Sulfate 8 meq/Calcium Gluconate 15 meq/ Multivitamins 10 ml/Chromium/ 

Copper/Manganese/ Seleni/Zn 0.5 ml/ Insulin Human Regular 25 unit/ Total 

Parenteral Nutrition/Amino Acids/Dextrose/ Fat Emulsion Intravenous 1,400 ml @  

58.333 mls/ hr TPN  CONT IV  Last administered on 4/3/20at 22:19;  Start 4/3/20 

at 22:00;  Stop 4/4/20 at 21:59;  Status DC


Heparin Sodium (Porcine) (Heparin Sodium) 5,000 unit Q12HR SQ  Last administered

on 4/26/20at 08:59;  Start 4/3/20 at 21:00;  Stop 4/26/20 at 10:05;  Status DC


Ondansetron HCl (Zofran) 4 mg PRN Q6HRS  PRN IV NAUSEA/VOMITING;  Start 4/6/20 

at 07:00;  Stop 4/7/20 at 06:59;  Status DC


Fentanyl Citrate (Fentanyl 2ml Vial) 25 mcg PRN Q5MIN  PRN IV MILD PAIN 1-3;  

Start 4/6/20 at 07:00;  Stop 4/7/20 at 06:59;  Status DC


Fentanyl Citrate (Fentanyl 2ml Vial) 50 mcg PRN Q5MIN  PRN IV MODERATE TO SEVERE

PAIN;  Start 4/6/20 at 07:00;  Stop 4/7/20 at 06:59;  Status DC


Ringer's Solution 1,000 ml @  30 mls/hr Q24H IV ;  Start 4/6/20 at 07:00;  Stop 

4/6/20 at 18:59;  Status DC


Lidocaine HCl (Xylocaine-Mpf 1% 2ml Vial) 2 ml PRN 1X  PRN ID PRIOR TO IV START;

 Start 4/6/20 at 07:00;  Stop 4/7/20 at 06:59;  Status DC


Prochlorperazine Edisylate (Compazine) 5 mg PACU PRN  PRN IV NAUSEA, MRX1;  

Start 4/6/20 at 07:00;  Stop 4/7/20 at 06:59;  Status DC


Sodium Chloride 1,000 ml @  1,000 mls/hr Q1H PRN IV hypotension;  Start 4/4/20 

at 09:10;  Stop 4/4/20 at 15:09;  Status DC


Albumin Human 200 ml @  200 mls/hr 1X PRN  PRN IV Hypotension Last administered 

on 4/4/20at 10:10;  Start 4/4/20 at 09:15;  Stop 4/4/20 at 15:14;  Status DC


Sodium Chloride 1,000 ml @  400 mls/hr Q2H30M PRN IV PATENCY;  Start 4/4/20 at 

09:10;  Stop 4/4/20 at 21:09;  Status DC


Info (PHARMACY MONITORING -- do not chart) 1 each PRN DAILY  PRN MC SEE 

COMMENTS;  Start 4/4/20 at 09:15;  Stop 4/5/20 at 12:45;  Status DC


Info (PHARMACY MONITORING -- do not chart) 1 each PRN DAILY  PRN MC SEE 

COMMENTS;  Start 4/4/20 at 09:15;  Stop 4/5/20 at 12:45;  Status DC


Sodium Chloride 90 meq/Potassium Chloride 15 meq/ Potassium Phosphate 10 mmol/ 

Magnesium Sulfate 8 meq/Calcium Gluconate 15 meq/ Multivitamins 10 ml/Chromium/ 

Copper/Manganese/ Seleni/Zn 0.5 ml/ Insulin Human Regular 25 unit/ Total 

Parenteral Nutrition/Amino Acids/Dextrose/ Fat Emulsion Intravenous 1,400 ml @  

58.333 mls/ hr TPN  CONT IV  Last administered on 4/4/20at 22:10;  Start 4/4/20 

at 22:00;  Stop 4/5/20 at 21:59;  Status DC


Magnesium Sulfate 50 ml @ 25 mls/hr PRN DAILY  PRN IV for Mag < 1.7 on am labs 

Last administered on 4/20/20at 17:27;  Start 4/5/20 at 09:15


Sodium Chloride 90 meq/Potassium Chloride 15 meq/ Potassium Phosphate 10 mmol/ 

Magnesium Sulfate 8 meq/Calcium Gluconate 15 meq/ Multivitamins 10 ml/Chromium/ 

Copper/Manganese/ Seleni/Zn 0.5 ml/ Insulin Human Regular 25 unit/ Total 

Parenteral Nutrition/Amino Acids/Dextrose/ Fat Emulsion Intravenous 1,400 ml @  

58.333 mls/ hr TPN  CONT IV  Last administered on 4/5/20at 21:20;  Start 4/5/20 

at 22:00;  Stop 4/6/20 at 21:59;  Status DC


Sodium Chloride 1,000 ml @  1,000 mls/hr Q1H PRN IV hypotension;  Start 4/5/20 

at 12:23;  Stop 4/5/20 at 18:22;  Status DC


Albumin Human 200 ml @  200 mls/hr 1X  ONCE IV  Last administered on 4/5/20at 

13:34;  Start 4/5/20 at 12:30;  Stop 4/5/20 at 13:29;  Status DC


Diphenhydramine HCl (Benadryl) 25 mg 1X PRN  PRN IV ITCHING;  Start 4/5/20 at 

12:30;  Stop 4/6/20 at 12:29;  Status DC


Diphenhydramine HCl (Benadryl) 25 mg 1X PRN  PRN IV ITCHING;  Start 4/5/20 at 12

:30;  Stop 4/6/20 at 12:29;  Status DC


Info (PHARMACY MONITORING -- do not chart) 1 each PRN DAILY  PRN MC SEE 

COMMENTS;  Start 4/5/20 at 12:30;  Status Cancel


Bupivacaine HCl/ Epinephrine Bitart (Sensorcain-Epi 0.5%-1:303920 Mpf) 30 ml 

STK-MED ONCE .ROUTE  Last administered on 4/6/20at 11:44;  Start 4/6/20 at 

11:00;  Stop 4/6/20 at 11:01;  Status DC


Cellulose (Surgicel Fibrillar 1x2) 1 each STK-MED ONCE .ROUTE ;  Start 4/6/20 at

11:00;  Stop 4/6/20 at 11:01;  Status DC


Sodium Chloride 90 meq/Potassium Chloride 15 meq/ Potassium Phosphate 10 mmol/ 

Magnesium Sulfate 12 meq/Calcium Gluconate 15 meq/ Multivitamins 10 ml/Chromium/

Copper/Manganese/ Seleni/Zn 0.5 ml/ Insulin Human Regular 25 unit/ Total 

Parenteral Nutrition/Amino Acids/Dextrose/ Fat Emulsion Intravenous 1,400 ml @  

58.333 mls/ hr TPN  CONT IV  Last administered on 4/6/20at 22:24;  Start 4/6/20 

at 22:00;  Stop 4/7/20 at 21:59;  Status DC


Propofol 20 ml @ As Directed STK-MED ONCE IV ;  Start 4/6/20 at 11:07;  Stop 

4/6/20 at 11:07;  Status DC


Cellulose (Surgicel Hemostat 4x8) 1 each STK-MED ONCE .ROUTE  Last administered 

on 4/6/20at 11:44;  Start 4/6/20 at 11:55;  Stop 4/6/20 at 11:56;  Status DC


Sevoflurane (Ultane) 60 ml STK-MED ONCE IH ;  Start 4/6/20 at 12:46;  Stop 

4/6/20 at 12:46;  Status DC


Sodium Chloride 1,000 ml @  1,000 mls/hr Q1H PRN IV hypotension;  Start 4/6/20 

at 13:51;  Stop 4/6/20 at 19:50;  Status DC


Albumin Human 200 ml @  200 mls/hr 1X PRN  PRN IV Hypotension Last administered 

on 4/6/20at 14:51;  Start 4/6/20 at 14:00;  Stop 4/6/20 at 19:59;  Status DC


Diphenhydramine HCl (Benadryl) 25 mg 1X PRN  PRN IV ITCHING;  Start 4/6/20 at 

14:00;  Stop 4/7/20 at 13:59;  Status DC


Diphenhydramine HCl (Benadryl) 25 mg 1X PRN  PRN IV ITCHING;  Start 4/6/20 at 

14:00;  Stop 4/7/20 at 13:59;  Status DC


Sodium Chloride 1,000 ml @  400 mls/hr Q2H30M PRN IV PATENCY;  Start 4/6/20 at 

13:51;  Stop 4/7/20 at 01:50;  Status DC


Info (PHARMACY MONITORING -- do not chart) 1 each PRN DAILY  PRN MC SEE 

COMMENTS;  Start 4/6/20 at 14:00;  Stop 4/9/20 at 08:16;  Status DC


Heparin Sodium (Porcine) (Hep Lock Adult) 500 unit STK-MED ONCE IVP ;  Start 

4/7/20 at 09:29;  Stop 4/7/20 at 09:30;  Status DC


Sodium Chloride 1,000 ml @  1,000 mls/hr Q1H PRN IV hypotension;  Start 4/7/20 

at 10:43;  Stop 4/7/20 at 16:42;  Status DC


Sodium Chloride 1,000 ml @  400 mls/hr Q2H30M PRN IV PATENCY;  Start 4/7/20 at 

10:43;  Stop 4/7/20 at 22:42;  Status DC


Info (PHARMACY MONITORING -- do not chart) 1 each PRN DAILY  PRN MC SEE 

COMMENTS;  Start 4/7/20 at 10:45;  Status UNV


Info (PHARMACY MONITORING -- do not chart) 1 each PRN DAILY  PRN MC SEE 

COMMENTS;  Start 4/7/20 at 10:45;  Status UNV


Sodium Chloride 90 meq/Potassium Chloride 15 meq/ Magnesium Sulfate 12 

meq/Calcium Gluconate 15 meq/ Multivitamins 10 ml/Chromium/ Copper/Manganese/ 

Seleni/Zn 0.5 ml/ Insulin Human Regular 25 unit/ Total Parenteral 

Nutrition/Amino Acids/Dextrose/ Fat Emulsion Intravenous 1,400 ml @  58.333 mls/

hr TPN  CONT IV  Last administered on 4/7/20at 22:13;  Start 4/7/20 at 22:00;  

Stop 4/8/20 at 21:59;  Status DC


Sodium Chloride 1,000 ml @  1,000 mls/hr Q1H PRN IV hypotension;  Start 4/8/20 

at 07:50;  Stop 4/8/20 at 13:49;  Status DC


Albumin Human 200 ml @  200 mls/hr 1X  ONCE IV ;  Start 4/8/20 at 08:00;  Stop 

4/8/20 at 08:53;  Status DC


Diphenhydramine HCl (Benadryl) 25 mg 1X PRN  PRN IV ITCHING;  Start 4/8/20 at 

08:00;  Stop 4/9/20 at 07:59;  Status DC


Diphenhydramine HCl (Benadryl) 25 mg 1X PRN  PRN IV ITCHING;  Start 4/8/20 at 

08:00;  Stop 4/9/20 at 07:59;  Status DC


Info (PHARMACY MONITORING -- do not chart) 1 each PRN DAILY  PRN MC SEE 

COMMENTS;  Start 4/8/20 at 08:00;  Stop 4/9/20 at 08:16;  Status DC


Albumin Human 50 ml @ 50 mls/hr 1X  ONCE IV ;  Start 4/8/20 at 08:53;  Stop 

4/8/20 at 08:56;  Status DC


Albumin Human 200 ml @  50 mls/hr PRN 1X  PRN IV HYPOTENSION Last administered 

on 4/14/20at 11:54;  Start 4/8/20 at 09:00


Meropenem 500 mg/ Sodium Chloride 50 ml @  100 mls/hr Q12H IV  Last administered

on 4/28/20at 10:45;  Start 4/8/20 at 10:00;  Stop 4/28/20 at 12:37;  Status DC


Sodium Chloride 90 meq/Magnesium Sulfate 12 meq/ Calcium Gluconate 15 meq/ 

Multivitamins 10 ml/Chromium/ Copper/Manganese/ Seleni/Zn 0.5 ml/ Insulin Human 

Regular 25 unit/ Total Parenteral Nutrition/Amino Acids/Dextrose/ Fat Emulsion 

Intravenous 1,400 ml @  58.333 mls/ hr TPN  CONT IV  Last administered on 

4/8/20at 21:41;  Start 4/8/20 at 22:00;  Stop 4/9/20 at 21:59;  Status DC


Sodium Chloride 1,000 ml @  1,000 mls/hr Q1H PRN IV hypotension;  Start 4/9/20 

at 07:58;  Stop 4/9/20 at 13:57;  Status DC


Albumin Human 200 ml @  200 mls/hr 1X PRN  PRN IV Hypotension Last administered 

on 4/9/20at 09:30;  Start 4/9/20 at 08:00;  Stop 4/9/20 at 13:59;  Status DC


Sodium Chloride 1,000 ml @  400 mls/hr Q2H30M PRN IV PATENCY;  Start 4/9/20 at 

07:58;  Stop 4/9/20 at 19:57;  Status DC


Info (PHARMACY MONITORING -- do not chart) 1 each PRN DAILY  PRN MC SEE 

COMMENTS;  Start 4/9/20 at 08:00;  Status Cancel


Info (PHARMACY MONITORING -- do not chart) 1 each PRN DAILY  PRN MC SEE 

COMMENTS;  Start 4/9/20 at 08:15;  Status UNV


Sodium Chloride 90 meq/Potassium Phosphate 5 mmol/ Magnesium Sulfate 12 

meq/Calcium Gluconate 15 meq/ Multivitamins 10 ml/Chromium/ Copper/Manganese/ 

Seleni/Zn 0.5 ml/ Insulin Human Regular 30 unit/ Total Parenteral 

Nutrition/Amino Acids/Dextrose/ Fat Emulsion Intravenous 1,400 ml @  58.333 mls/

hr TPN  CONT IV  Last administered on 4/9/20at 22:08;  Start 4/9/20 at 22:00;  

Stop 4/10/20 at 21:59;  Status DC


Linezolid/Dextrose 300 ml @  300 mls/hr Q12HR IV  Last administered on 4/20/20at

20:40;  Start 4/10/20 at 11:00;  Stop 4/21/20 at 08:10;  Status DC


Sodium Chloride 90 meq/Potassium Phosphate 15 mmol/ Magnesium Sulfate 12 

meq/Calcium Gluconate 15 meq/ Multivitamins 10 ml/Chromium/ Copper/Manganese/ 

Seleni/Zn 0.5 ml/ Insulin Human Regular 30 unit/ Total Parenteral 

Nutrition/Amino Acids/Dextrose/ Fat Emulsion Intravenous 1,400 ml @  58.333 mls/

hr TPN  CONT IV  Last administered on 4/10/20at 21:49;  Start 4/10/20 at 22:00; 

Stop 4/11/20 at 21:59;  Status DC


Sodium Chloride 90 meq/Potassium Phosphate 15 mmol/ Magnesium Sulfate 12 

meq/Calcium Gluconate 15 meq/ Multivitamins 10 ml/Chromium/ Copper/Manganese/ 

Seleni/Zn 0.5 ml/ Insulin Human Regular 40 unit/ Total Parenteral 

Nutrition/Amino Acids/Dextrose/ Fat Emulsion Intravenous 1,400 ml @  58.333 mls/

hr TPN  CONT IV  Last administered on 4/11/20at 21:21;  Start 4/11/20 at 22:00; 

Stop 4/12/20 at 21:59;  Status DC


Sodium Chloride 1,000 ml @  1,000 mls/hr Q1H PRN IV hypotension;  Start 4/11/20 

at 13:26;  Stop 4/11/20 at 19:25;  Status DC


Albumin Human 200 ml @  200 mls/hr 1X PRN  PRN IV Hypotension Last administered 

on 4/11/20at 15:00;  Start 4/11/20 at 13:30;  Stop 4/11/20 at 19:29;  Status DC


Sodium Chloride (Normal Saline Flush) 10 ml 1X PRN  PRN IV AP catheter pack;  

Start 4/11/20 at 13:30;  Stop 4/12/20 at 13:29;  Status DC


Sodium Chloride (Normal Saline Flush) 10 ml 1X PRN  PRN IV  catheter pack;  

Start 4/11/20 at 13:30;  Stop 4/12/20 at 13:29;  Status DC


Sodium Chloride 1,000 ml @  400 mls/hr Q2H30M PRN IV PATENCY;  Start 4/11/20 at 

13:26;  Stop 4/12/20 at 01:25;  Status DC


Info (PHARMACY MONITORING -- do not chart) 1 each PRN DAILY  PRN MC SEE 

COMMENTS;  Start 4/11/20 at 13:30;  Stop 4/11/20 at 13:33;  Status DC


Info (PHARMACY MONITORING -- do not chart) 1 each PRN DAILY  PRN MC SEE 

COMMENTS;  Start 4/11/20 at 13:30;  Stop 4/11/20 at 13:34;  Status DC


Sodium Chloride 90 meq/Potassium Phosphate 19 mmol/ Magnesium Sulfate 12 

meq/Calcium Gluconate 15 meq/ Multivitamins 10 ml/Chromium/ Copper/Manganese/ 

Seleni/Zn 0.5 ml/ Insulin Human Regular 40 unit/ Total Parenteral 

Nutrition/Amino Acids/Dextrose/ Fat Emulsion Intravenous 1,400 ml @  58.333 mls/

hr TPN  CONT IV  Last administered on 4/12/20at 21:54;  Start 4/12/20 at 22:00; 

Stop 4/13/20 at 21:59;  Status DC


Sodium Chloride 1,000 ml @  1,000 mls/hr Q1H PRN IV hypotension;  Start 4/13/20 

at 09:35;  Stop 4/13/20 at 15:34;  Status DC


Albumin Human 200 ml @  200 mls/hr 1X PRN  PRN IV Hypotension;  Start 4/13/20 at

09:45;  Stop 4/13/20 at 15:44;  Status DC


Diphenhydramine HCl (Benadryl) 25 mg 1X PRN  PRN IV ITCHING;  Start 4/13/20 at 

09:45;  Stop 4/14/20 at 09:44;  Status DC


Diphenhydramine HCl (Benadryl) 25 mg 1X PRN  PRN IV ITCHING;  Start 4/13/20 at 

09:45;  Stop 4/14/20 at 09:44;  Status DC


Sodium Chloride 1,000 ml @  400 mls/hr Q2H30M PRN IV PATENCY;  Start 4/13/20 at 

09:35;  Stop 4/13/20 at 21:34;  Status DC


Info (PHARMACY MONITORING -- do not chart) 1 each PRN DAILY  PRN MC SEE 

COMMENTS;  Start 4/13/20 at 09:45;  Status Cancel


Sodium Chloride 100 meq/Potassium Phosphate 19 mmol/ Magnesium Sulfate 12 

meq/Calcium Gluconate 15 meq/ Multivitamins 10 ml/Chromium/ Copper/Manganese/ 

Seleni/Zn 0.5 ml/ Insulin Human Regular 40 unit/ Potassium Chloride 20 meq/ 

Total Parenteral Nutrition/Amino Acids/Dextrose/ Fat Emulsion Intravenous 1,400 

ml @  58.333 mls/ hr TPN  CONT IV  Last administered on 4/13/20at 22:02;  Start 

4/13/20 at 22:00;  Stop 4/14/20 at 21:59;  Status DC


Furosemide (Lasix) 40 mg 1X  ONCE IVP  Last administered on 4/13/20at 14:39;  

Start 4/13/20 at 14:30;  Stop 4/13/20 at 14:31;  Status DC


Metronidazole 100 ml @  100 mls/hr Q8HRS IV  Last administered on 4/21/20at 

06:04;  Start 4/14/20 at 10:00;  Stop 4/21/20 at 08:10;  Status DC


Sodium Chloride 1,000 ml @  1,000 mls/hr Q1H PRN IV hypotension;  Start 4/14/20 

at 08:00;  Stop 4/14/20 at 13:59;  Status DC


Albumin Human 200 ml @  200 mls/hr 1X PRN  PRN IV Hypotension;  Start 4/14/20 at

08:00;  Stop 4/14/20 at 13:59;  Status DC


Sodium Chloride 1,000 ml @  400 mls/hr Q2H30M PRN IV PATENCY;  Start 4/14/20 at 

08:00;  Stop 4/14/20 at 19:59;  Status DC


Info (PHARMACY MONITORING -- do not chart) 1 each PRN DAILY  PRN MC SEE 

COMMENTS;  Start 4/14/20 at 11:30;  Status UNV


Info (PHARMACY MONITORING -- do not chart) 1 each PRN DAILY  PRN MC SEE 

COMMENTS;  Start 4/14/20 at 11:30;  Stop 4/16/20 at 12:13;  Status DC


Sodium Chloride 100 meq/Potassium Phosphate 19 mmol/ Magnesium Sulfate 12 

meq/Calcium Gluconate 15 meq/ Multivitamins 10 ml/Chromium/ Copper/Manganese/ 

Seleni/Zn 0.5 ml/ Insulin Human Regular 40 unit/ Potassium Chloride 20 meq/ 

Total Parenteral Nutrition/Amino Acids/Dextrose/ Fat Emulsion Intravenous 1,400 

ml @  58.333 mls/ hr TPN  CONT IV  Last administered on 4/14/20at 21:52;  Start 

4/14/20 at 22:00;  Stop 4/15/20 at 21:59;  Status DC


Sodium Chloride (Normal Saline Flush) 10 ml QSHIFT  PRN IV AFTER MEDS AND BLOOD 

DRAWS;  Start 4/14/20 at 15:00;  Stop 5/12/20 at 11:27;  Status DC


Sodium Chloride (Normal Saline Flush) 10 ml PRN Q5MIN  PRN IV AFTER MEDS AND 

BLOOD DRAWS;  Start 4/14/20 at 15:00


Sodium Chloride (Normal Saline Flush) 20 ml PRN Q5MIN  PRN IV AFTER MEDS AND 

BLOOD DRAWS;  Start 4/14/20 at 15:00


Sodium Chloride 100 meq/Potassium Phosphate 19 mmol/ Magnesium Sulfate 12 

meq/Calcium Gluconate 15 meq/ Multivitamins 10 ml/Chromium/ Copper/Manganese/ 

Seleni/Zn 0.5 ml/ Insulin Human Regular 40 unit/ Potassium Chloride 20 meq/ 

Total Parenteral Nutrition/Amino Acids/Dextrose/ Fat Emulsion Intravenous 1,400 

ml @  58.333 mls/ hr TPN  CONT IV  Last administered on 4/15/20at 21:20;  Start 

4/15/20 at 22:00;  Stop 4/16/20 at 21:59;  Status DC


Lidocaine HCl (Buffered Lidocaine 1%) 3 ml STK-MED ONCE .ROUTE ;  Start 4/15/20 

at 13:16;  Stop 4/15/20 at 13:16;  Status DC


Lidocaine HCl (Buffered Lidocaine 1%) 6 ml 1X  ONCE INJ  Last administered on 

4/15/20at 13:45;  Start 4/15/20 at 13:30;  Stop 4/15/20 at 13:31;  Status DC


Albumin Human 100 ml @  100 mls/hr 1X  ONCE IV  Last administered on 4/15/20at 

15:41;  Start 4/15/20 at 15:00;  Stop 4/15/20 at 15:59;  Status DC


Albumin Human 50 ml @ 50 mls/hr 1X  ONCE IV  Last administered on 4/15/20at 

15:00;  Start 4/15/20 at 15:00;  Stop 4/15/20 at 15:59;  Status DC


Info (PHARMACY MONITORING -- do not chart) 1 each PRN DAILY  PRN MC SEE 

COMMENTS;  Start 4/16/20 at 11:30;  Status Cancel


Info (PHARMACY MONITORING -- do not chart) 1 each PRN DAILY  PRN MC SEE 

COMMENTS;  Start 4/16/20 at 11:30;  Status UNV


Sodium Chloride 100 meq/Potassium Phosphate 10 mmol/ Magnesium Sulfate 12 

meq/Calcium Gluconate 15 meq/ Multivitamins 10 ml/Chromium/ Copper/Manganese/ 

Seleni/Zn 0.5 ml/ Insulin Human Regular 35 unit/ Potassium Chloride 20 meq/ 

Total Parenteral Nutrition/Amino Acids/Dextrose/ Fat Emulsion Intravenous 1,400 

ml @  58.333 mls/ hr TPN  CONT IV  Last administered on 4/16/20at 22:10;  Start 

4/16/20 at 22:00;  Stop 4/17/20 at 21:59;  Status DC


Sodium Chloride 100 meq/Potassium Phosphate 5 mmol/ Magnesium Sulfate 12 

meq/Calcium Gluconate 15 meq/ Multivitamins 10 ml/Chromium/ Copper/Manganese/ 

Seleni/Zn 0.5 ml/ Insulin Human Regular 35 unit/ Potassium Chloride 20 meq/ [place]

l Parenteral Nutrition/Amino Acids/Dextrose/ Fat Emulsion Intravenous 1,400 ml @

 58.333 mls/ hr TPN  CONT IV  Last administered on 4/17/20at 22:59;  Start 

4/17/20 at 22:00;  Stop 4/18/20 at 21:59;  Status DC


Sodium Chloride 1,000 ml @  1,000 mls/hr Q1H PRN IV hypotension;  Start 4/18/20 

at 08:27;  Stop 4/18/20 at 14:26;  Status DC


Albumin Human 200 ml @  200 mls/hr 1X PRN  PRN IV Hypotension Last administered 

on 4/18/20at 09:18;  Start 4/18/20 at 08:30;  Stop 4/18/20 at 14:29;  Status DC


Sodium Chloride 1,000 ml @  400 mls/hr Q2H30M PRN IV PATENCY;  Start 4/18/20 at 

08:27;  Stop 4/18/20 at 20:26;  Status DC


Info (PHARMACY MONITORING -- do not chart) 1 each PRN DAILY  PRN MC SEE 

COMMENTS;  Start 4/18/20 at 08:30;  Status Cancel


Info (PHARMACY MONITORING -- do not chart) 1 each PRN DAILY  PRN MC SEE 

COMMENTS;  Start 4/18/20 at 08:30;  Stop 4/26/20 at 13:10;  Status DC


Sodium Chloride 100 meq/Potassium Chloride 40 meq/ Magnesium Sulfate 15 

meq/Calcium Gluconate 15 meq/ Multivitamins 10 ml/Chromium/ Copper/Manganese/ 

Seleni/Zn 0.5 ml/ Insulin Human Regular 35 unit/ Total Parenteral 

Nutrition/Amino Acids/Dextrose/ Fat Emulsion Intravenous 1,400 ml @  58.333 mls/

hr TPN  CONT IV  Last administered on 4/18/20at 22:00;  Start 4/18/20 at 22:00; 

Stop 4/19/20 at 21:59;  Status DC


Potassium Chloride/Water 100 ml @  100 mls/hr 1X  ONCE IV  Last administered on 

4/18/20at 17:28;  Start 4/18/20 at 14:45;  Stop 4/18/20 at 15:44;  Status DC


Sodium Chloride 100 meq/Potassium Chloride 40 meq/ Magnesium Sulfate 15 

meq/Calcium Gluconate 15 meq/ Multivitamins 10 ml/Chromium/ Copper/Manganese/ 

Seleni/Zn 0.5 ml/ Insulin Human Regular 35 unit/ Total Parenteral 

Nutrition/Amino Acids/Dextrose/ Fat Emulsion Intravenous 1,400 ml @  58.333 mls/

hr TPN  CONT IV  Last administered on 4/19/20at 22:46;  Start 4/19/20 at 22:00; 

Stop 4/20/20 at 21:59;  Status DC


Sodium Chloride 100 meq/Potassium Chloride 40 meq/ Magnesium Sulfate 20 

meq/Calcium Gluconate 15 meq/ Multivitamins 10 ml/Chromium/ Copper/Manganese/ 

Seleni/Zn 0.5 ml/ Insulin Human Regular 35 unit/ Total Parenteral 

Nutrition/Amino Acids/Dextrose/ Fat Emulsion Intravenous 1,400 ml @  58.333 mls/

hr TPN  CONT IV  Last administered on 4/20/20at 22:31;  Start 4/20/20 at 22:00; 

Stop 4/21/20 at 21:59;  Status DC


Fentanyl Citrate (Fentanyl 2ml Vial) 50 mcg PRN Q2HR  PRN IVP PAIN Last 

administered on 4/27/20at 13:32;  Start 4/20/20 at 21:00;  Stop 4/28/20 at 

12:53;  Status DC


Fentanyl Citrate (Fentanyl 2ml Vial) 25 mcg PRN Q2HR  PRN IVP PAIN;  Start 

4/20/20 at 21:00;  Stop 4/28/20 at 12:54;  Status DC


Enoxaparin Sodium (Lovenox 100mg Syringe) 100 mg Q12HR SQ ;  Start 4/21/20 at 

21:00;  Status UNV


Amino Acids/ Glycerin/ Electrolytes 1,000 ml @  75 mls/hr [id number] IV ;  Start 

4/20/20 at 21:15;  Status UNV


Sodium Chloride 1,000 ml @  1,000 mls/hr Q1H PRN IV hypotension;  Start 4/21/20 

at 07:56;  Stop 4/21/20 at 13:55;  Status DC


Albumin Human 200 ml @  200 mls/hr 1X PRN  PRN IV Hypotension Last administered 

on 4/21/20at 08:40;  Start 4/21/20 at 08:00;  Stop 4/21/20 at 13:59;  Status DC


Sodium Chloride 1,000 ml @  400 mls/hr Q2H30M PRN IV PATENCY;  Start 4/21/20 at 

07:56;  Stop 4/21/20 at 19:55;  Status DC


Info (PHARMACY MONITORING -- do not chart) 1 each PRN DAILY  PRN MC SEE 

COMMENTS;  Start 4/21/20 at 08:00;  Status UNV


Info (PHARMACY MONITORING -- do not chart) 1 each PRN DAILY  PRN MC SEE 

COMMENTS;  Start 4/21/20 at 08:00;  Status UNV


Daptomycin 430 mg/ Sodium Chloride 50 ml @  100 mls/hr Q24H IV  Last 

administered on 4/21/20at 12:35;  Start 4/21/20 at 09:00;  Stop 4/21/20 at 

12:49;  Status DC


Sodium Chloride 100 meq/Potassium Chloride 40 meq/ Magnesium Sulfate 20 

meq/Calcium Gluconate 15 meq/ Multivitamins 10 ml/Chromium/ Copper/Manganese/ 

Seleni/Zn 0.5 ml/ Insulin Human Regular 35 unit/ Total Parenteral 

Nutrition/Amino Acids/Dextrose/ Fat Emulsion Intravenous 1,400 ml @  58.333 mls/

hr TPN  CONT IV  Last administered on 4/21/20at 21:26;  Start 4/21/20 at 22:00; 

Stop 4/22/20 at 21:59;  Status DC


Daptomycin 430 mg/ Sodium Chloride 50 ml @  100 mls/hr Q48H IV ;  Start 4/23/20 

at 09:00;  Stop 4/22/20 at 11:55;  Status DC


Sodium Chloride 100 meq/Potassium Chloride 40 meq/ Magnesium Sulfate 20 

meq/Calcium Gluconate 15 meq/ Multivitamins 10 ml/Chromium/ Copper/Manganese/ 

Seleni/Zn 0.5 ml/ Insulin Human Regular 35 unit/ Total Parenteral 

Nutrition/Amino Acids/Dextrose/ Fat Emulsion Intravenous 1,400 ml @  58.333 mls/

hr TPN  CONT IV  Last administered on 4/22/20at 22:27;  Start 4/22/20 at 22:00; 

Stop 4/23/20 at 21:59;  Status DC


Daptomycin 430 mg/ Sodium Chloride 50 ml @  100 mls/hr Q24H IV  Last 

administered on 4/24/20at 15:07;  Start 4/22/20 at 13:00;  Stop 4/25/20 at 

13:15;  Status DC


Sodium Chloride 100 meq/Potassium Chloride 40 meq/ Magnesium Sulfate 20 

meq/Calcium Gluconate 10 meq/ Multivitamins 10 ml/Chromium/ Copper/Manganese/ 

Seleni/Zn 0.5 ml/ Insulin Human Regular 35 unit/ Total Parenteral 

Nutrition/Amino Acids/Dextrose/ Fat Emulsion Intravenous 1,400 ml @  58.333 mls/

hr TPN  CONT IV  Last administered on 4/24/20at 00:06;  Start 4/23/20 at 22:00; 

Stop 4/24/20 at 21:59;  Status DC


Alteplase, Recombinant (Cathflo For Central Catheter Clearance) 1 mg 1X  ONCE 

INT CAT  Last administered on 4/24/20at 11:44;  Start 4/24/20 at 10:45;  Stop 

4/24/20 at 10:46;  Status DC


Ondansetron HCl (Zofran) 4 mg PRN Q6HRS  PRN IV NAUSEA/VOMITING;  Start 4/27/20 

at 07:00;  Stop 4/28/20 at 06:59;  Status DC


Fentanyl Citrate (Fentanyl 2ml Vial) 25 mcg PRN Q5MIN  PRN IV MILD PAIN 1-3;  

Start 4/27/20 at 07:00;  Stop 4/28/20 at 06:59;  Status DC


Fentanyl Citrate (Fentanyl 2ml Vial) 50 mcg PRN Q5MIN  PRN IV MODERATE TO SEVERE

PAIN Last administered on 4/27/20at 10:17;  Start 4/27/20 at 07:00;  Stop 

4/28/20 at 06:59;  Status DC


Ringer's Solution 1,000 ml @  30 mls/hr Q24H IV ;  Start 4/27/20 at 07:00;  Stop

4/27/20 at 18:59;  Status DC


Lidocaine HCl (Xylocaine-Mpf 1% 2ml Vial) 2 ml PRN 1X  PRN ID PRIOR TO IV START;

 Start 4/27/20 at 07:00;  Stop 4/28/20 at 06:59;  Status DC


Prochlorperazine Edisylate (Compazine) 5 mg PACU PRN  PRN IV NAUSEA, MRX1;  

Start 4/27/20 at 07:00;  Stop 4/28/20 at 06:59;  Status DC


Sodium Acetate 50 meq/Potassium Acetate 55 meq/ Magnesium Sulfate 20 meq/Calcium

Gluconate 10 meq/ Multivitamins 10 ml/Chromium/ Copper/Manganese/ Seleni/Zn 0.5 

ml/ Insulin Human Regular 35 unit/ Total Parenteral Nutrition/Amino 

Acids/Dextrose/ Fat Emulsion Intravenous 1,400 ml @  58.333 mls/ hr TPN  CONT IV

;  Start 4/24/20 at 22:00;  Stop 4/24/20 at 14:15;  Status DC


Sodium Acetate 50 meq/Potassium Acetate 55 meq/ Magnesium Sulfate 20 meq/Calcium

Gluconate 10 meq/ Multivitamins 10 ml/Chromium/ Copper/Manganese/ Seleni/Zn 0.5 

ml/ Insulin Human Regular 35 unit/ Total Parenteral Nutrition/Amino 

Acids/Dextrose/ Fat Emulsion Intravenous 1,800 ml @  75 mls/hr TPN  CONT IV  

Last administered on 4/24/20at 22:38;  Start 4/24/20 at 22:00;  Stop 4/25/20 at 

21:59;  Status DC


Sodium Chloride 1,000 ml @  1,000 mls/hr Q1H PRN IV hypotension;  Start 4/24/20 

at 15:31;  Stop 4/24/20 at 21:30;  Status DC


Diphenhydramine HCl (Benadryl) 25 mg 1X PRN  PRN IV ITCHING;  Start 4/24/20 at 

15:45;  Stop 4/25/20 at 15:44;  Status DC


Diphenhydramine HCl (Benadryl) 25 mg 1X PRN  PRN IV ITCHING;  Start 4/24/20 at 

15:45;  Stop 4/25/20 at 15:44;  Status DC


Sodium Chloride 1,000 ml @  400 mls/hr Q2H30M PRN IV PATENCY;  Start 4/24/20 at 

15:31;  Stop 4/25/20 at 03:30;  Status DC


Info (PHARMACY MONITORING -- do not chart) 1 each PRN DAILY  PRN MC SEE 

COMMENTS;  Start 4/24/20 at 15:45


Sodium Acetate 50 meq/Potassium Acetate 55 meq/ Magnesium Sulfate 20 meq/Calcium

Gluconate 10 meq/ Multivitamins 10 ml/Chromium/ Copper/Manganese/ Seleni/Zn 0.5 

ml/ Insulin Human Regular 35 unit/ Total Parenteral Nutrition/Amino 

Acids/Dextrose/ Fat Emulsion Intravenous 1,800 ml @  75 mls/hr TPN  CONT IV  

Last administered on 4/25/20at 22:03;  Start 4/25/20 at 22:00;  Stop 4/26/20 at 

21:59;  Status DC


Daptomycin 430 mg/ Sodium Chloride 50 ml @  100 mls/hr Q24H IV  Last 

administered on 4/30/20at 13:00;  Start 4/25/20 at 13:00;  Stop 4/30/20 at 

20:58;  Status DC


Heparin Sodium (Porcine) 1000 unit/Sodium Chloride 1,001 ml @  1,001 mls/hr 1X  

ONCE IRR ;  Start 4/27/20 at 06:00;  Stop 4/27/20 at 06:59;  Status DC


Potassium Acetate 55 meq/Magnesium Sulfate 20 meq/ Calcium Gluconate 10 meq/ 

Multivitamins 10 ml/Chromium/ Copper/Manganese/ Seleni/Zn 0.5 ml/ Insulin Human 

Regular 35 unit/ Total Parenteral Nutrition/Amino Acids/Dextrose/ Fat Emulsion 

Intravenous 1,920 ml @  80 mls/hr TPN  CONT IV  Last administered on 4/26/20at 

22:10;  Start 4/26/20 at 22:00;  Stop 4/27/20 at 21:59;  Status DC


Dexamethasone Sodium Phosphate (Decadron) 4 mg STK-MED ONCE .ROUTE ;  Start 

4/27/20 at 10:56;  Stop 4/27/20 at 10:57;  Status DC


Ondansetron HCl (Zofran) 4 mg STK-MED ONCE .ROUTE ;  Start 4/27/20 at 10:56;  

Stop 4/27/20 at 10:57;  Status DC


Rocuronium Bromide (Zemuron) 50 mg STK-MED ONCE .ROUTE ;  Start 4/27/20 at 

10:56;  Stop 4/27/20 at 10:57;  Status DC


Fentanyl Citrate (Fentanyl 2ml Vial) 100 mcg STK-MED ONCE .ROUTE ;  Start 

4/27/20 at 10:56;  Stop 4/27/20 at 10:57;  Status DC


Bupivacaine HCl/ Epinephrine Bitart (Sensorcain-Epi 0.5%-1:925908 Mpf) 30 ml 

STK-MED ONCE .ROUTE  Last administered on 4/27/20at 12:01;  Start 4/27/20 at 

10:58;  Stop 4/27/20 at 10:58;  Status DC


Cellulose (Surgicel Hemostat 2x14) 1 each STK-MED ONCE .ROUTE ;  Start 4/27/20 

at 10:58;  Stop 4/27/20 at 10:59;  Status DC


Iohexol (Omnipaque 300 Mg/ml) 50 ml STK-MED ONCE .ROUTE ;  Start 4/27/20 at 10:

58;  Stop 4/27/20 at 10:59;  Status DC


Cellulose (Surgicel Hemostat 4x8) 1 each STK-MED ONCE .ROUTE ;  Start 4/27/20 at

10:58;  Stop 4/27/20 at 10:59;  Status DC


Bisacodyl (Dulcolax Supp) 10 mg STK-MED ONCE .ROUTE ;  Start 4/27/20 at 10:59;  

Stop 4/27/20 at 10:59;  Status DC


Heparin Sodium (Porcine) 1000 unit/Sodium Chloride 1,001 ml @  1,001 mls/hr 1X  

ONCE IRR ;  Start 4/27/20 at 12:00;  Stop 4/27/20 at 12:59;  Status DC


Propofol 20 ml @ As Directed STK-MED ONCE IV ;  Start 4/27/20 at 11:05;  Stop 

4/27/20 at 11:05;  Status DC


Sevoflurane (Ultane) 90 ml STK-MED ONCE IH ;  Start 4/27/20 at 11:05;  Stop 

4/27/20 at 11:05;  Status DC


Sevoflurane (Ultane) 60 ml STK-MED ONCE IH ;  Start 4/27/20 at 12:26;  Stop 

4/27/20 at 12:27;  Status DC


Propofol 20 ml @ As Directed STK-MED ONCE IV ;  Start 4/27/20 at 12:26;  Stop 

4/27/20 at 12:27;  Status DC


Phenylephrine HCl (PHENYLEPHRINE in 0.9% NACL PF) 1 mg STK-MED ONCE IV ;  Start 

4/27/20 at 12:34;  Stop 4/27/20 at 12:34;  Status DC


Heparin Sodium (Porcine) (Heparin Sodium) 5,000 unit Q12HR SQ  Last administered

on 5/6/20at 20:57;  Start 4/27/20 at 21:00;  Stop 5/7/20 at 09:59;  Status DC


Sodium Chloride (Normal Saline Flush) 3 ml QSHIFT  PRN IV AFTER MEDS AND BLOOD 

DRAWS;  Start 4/27/20 at 13:45


Naloxone HCl (Narcan) 0.4 mg PRN Q2MIN  PRN IV SEE INSTRUCTIONS;  Start 4/27/20 

at 13:45


Sodium Chloride 1,000 ml @  25 mls/hr Q24H IV  Last administered on 5/14/20at 

14:17;  Start 4/27/20 at 13:37


Naloxone HCl (Narcan) 0.4 mg PRN Q2MIN  PRN IV SEE INSTRUCTIONS;  Start 4/27/20 

at 14:30;  Status UNV


Sodium Chloride 1,000 ml @  25 mls/hr Q24H IV ;  Start 4/27/20 at 14:30;  Status

UNV


Hydromorphone HCl 30 ml @ 0 mls/hr CONT PRN  PRN IV PER PROTOCOL Last 

administered on 5/2/20at 16:08;  Start 4/27/20 at 14:30;  Stop 5/4/20 at 08:55; 

Status DC


Potassium Acetate 55 meq/Magnesium Sulfate 20 meq/ Calcium Gluconate 10 meq/ 

Multivitamins 10 ml/Chromium/ Copper/Manganese/ Seleni/Zn 0.5 ml/ Insulin Human 

Regular 35 unit/ Total Parenteral Nutrition/Amino Acids/Dextrose/ Fat Emulsion 

Intravenous 1,920 ml @  80 mls/hr TPN  CONT IV  Last administered on 4/27/20at 

22:01;  Start 4/27/20 at 22:00;  Stop 4/28/20 at 21:59;  Status DC


Bumetanide (Bumex) 2 mg [id number] IV  Last administered on 5/1/20at 13:50;  Start 

4/28/20 at 14:00;  Stop 5/2/20 at 14:10;  Status DC


Meropenem 1 gm/ Sodium Chloride 100 ml @  200 mls/hr Q8HRS IV  Last administered

on 5/16/20at 06:46;  Start 4/28/20 at 14:00


Potassium Acetate 55 meq/Magnesium Sulfate 20 meq/ Calcium Gluconate 10 meq/ 

Multivitamins 10 ml/Chromium/ Copper/Manganese/ Seleni/Zn 0.5 ml/ Insulin Human 

Regular 35 unit/ Total Parenteral Nutrition/Amino Acids/Dextrose/ Fat Emulsion 

Intravenous 1,920 ml @  80 mls/hr TPN  CONT IV  Last administered on 4/28/20at 

22:02;  Start 4/28/20 at 22:00;  Stop 4/29/20 at 21:59;  Status DC


Hydromorphone HCl (Dilaudid Standard PCA) 12 mg STK-MED ONCE IV ;  Start 4/27/20

at 14:35;  Stop 4/28/20 at 13:53;  Status DC


Artificial Tears (Artificial Tears) 1 drop PRN Q15MIN  PRN OU DRY EYE Last 

administered on 5/8/20at 22:00;  Start 4/29/20 at 05:30


Hydromorphone HCl (Dilaudid Standard PCA) 12 mg STK-MED ONCE IV ;  Start 4/28/20

at 12:05;  Stop 4/29/20 at 09:15;  Status DC


Potassium Acetate 65 meq/Magnesium Sulfate 20 meq/ Calcium Gluconate 10 meq/ 

Multivitamins 10 ml/Chromium/ Copper/Manganese/ Seleni/Zn 0.5 ml/ Insulin Human 

Regular 30 unit/ Total Parenteral Nutrition/Amino Acids/Dextrose/ Fat Emulsion 

Intravenous 1,920 ml @  80 mls/hr TPN  CONT IV  Last administered on 4/29/20at 

22:22;  Start 4/29/20 at 22:00;  Stop 4/30/20 at 21:59;  Status DC


Cyclobenzaprine HCl (Flexeril) 10 mg PRN Q6HRS  PRN PO MUSCLE SPASMS;  Start 

4/30/20 at 10:45


Potassium Acetate 55 meq/Magnesium Sulfate 20 meq/ Calcium Gluconate 10 meq/ 

Multivitamins 10 ml/Chromium/ Copper/Manganese/ Seleni/Zn 0.5 ml/ Insulin Human 

Regular 30 unit/ Total Parenteral Nutrition/Amino Acids/Dextrose/ Fat Emulsion 

Intravenous 1,920 ml @  80 mls/hr TPN  CONT IV  Last administered on 5/1/20at 

01:00;  Start 4/30/20 at 22:00;  Stop 5/1/20 at 21:59;  Status DC


Magnesium Sulfate 50 ml @ 25 mls/hr 1X  ONCE IV  Last administered on 4/30/20at 

17:18;  Start 4/30/20 at 12:45;  Stop 4/30/20 at 14:44;  Status DC


Potassium Chloride/Water 100 ml @  100 mls/hr 1X  ONCE IV  Last administered on 

5/1/20at 11:27;  Start 5/1/20 at 12:00;  Stop 5/1/20 at 12:59;  Status DC


Hydromorphone HCl (Dilaudid Standard PCA) 12 mg STK-MED ONCE IV ;  Start 4/29/20

at 10:50;  Stop 5/1/20 at 11:02;  Status DC


Hydromorphone HCl (Dilaudid Standard PCA) 12 mg STK-MED ONCE IV ;  Start 4/30/20

at 13:47;  Stop 5/1/20 at 11:03;  Status DC


Potassium Acetate 30 meq/Magnesium Sulfate 20 meq/ Calcium Gluconate 10 meq/ 

Multivitamins 10 ml/Chromium/ Copper/Manganese/ Seleni/Zn 0.5 ml/ Insulin Human 

Regular 30 unit/ Potassium Chloride 30 meq/ Total Parenteral Nutrition/Amino 

Acids/Dextrose/ Fat Emulsion Intravenous 1,920 ml @  80 mls/hr TPN  CONT IV  

Last administered on 5/1/20at 22:34;  Start 5/1/20 at 22:00;  Stop 5/2/20 at 

21:59;  Status DC


Potassium Chloride/Water 100 ml @  100 mls/hr Q1H IV  Last administered on 

5/2/20at 13:05;  Start 5/2/20 at 07:00;  Stop 5/2/20 at 10:59;  Status DC


Magnesium Sulfate 50 ml @ 25 mls/hr 1X  ONCE IV  Last administered on 5/2/20at 

10:34;  Start 5/2/20 at 10:30;  Stop 5/2/20 at 12:29;  Status DC


Potassium Chloride 75 meq/ Magnesium Sulfate 20 meq/Calcium Gluconate 10 meq/ 

Multivitamins 10 ml/Chromium/ Copper/Manganese/ Seleni/Zn 0.5 ml/ Insulin Human 

Regular 30 unit/ Total Parenteral Nutrition/Amino Acids/Dextrose/ Fat Emulsion 

Intravenous 1,920 ml @  80 mls/hr TPN  CONT IV  Last administered on 5/2/20at 

21:51;  Start 5/2/20 at 22:00;  Stop 5/3/20 at 22:00;  Status DC


Potassium Chloride 75 meq/ Magnesium Sulfate 20 meq/Calcium Gluconate 10 meq/ 

Multivitamins 10 ml/Chromium/ Copper/Manganese/ Seleni/Zn 0.5 ml/ Insulin Human 

Regular 25 unit/ Total Parenteral Nutrition/Amino Acids/Dextrose/ Fat Emulsion 

Intravenous 1,920 ml @  80 mls/hr TPN  CONT IV  Last administered on 5/3/20at 

22:04;  Start 5/3/20 at 22:00;  Stop 5/4/20 at 21:59;  Status DC


Hydromorphone HCl (Dilaudid) 0.4 mg PRN Q4HRS  PRN IVP PAIN Last administered on

5/4/20at 10:57;  Start 5/4/20 at 09:00;  Stop 5/4/20 at 18:59;  Status DC


Micafungin Sodium 100 mg/Dextrose 100 ml @  100 mls/hr Q24H IV  Last 

administered on 5/15/20at 11:08;  Start 5/4/20 at 11:00


Daptomycin 485 mg/ Sodium Chloride 50 ml @  100 mls/hr Q24H IV  Last 

administered on 5/11/20at 13:10;  Start 5/4/20 at 11:00;  Stop 5/12/20 at 07:44;

 Status DC


Potassium Chloride 75 meq/ Magnesium Sulfate 15 meq/Calcium Gluconate 8 meq/ 

Multivitamins 10 ml/Chromium/ Copper/Manganese/ Seleni/Zn 0.5 ml/ Insulin Human 

Regular 25 unit/ Total Parenteral Nutrition/Amino Acids/Dextrose/ Fat Emulsion 

Intravenous 1,920 ml @  80 mls/hr TPN  CONT IV  Last administered on 5/4/20at 

23:08;  Start 5/4/20 at 22:00;  Stop 5/5/20 at 21:59;  Status DC


Haloperidol Lactate (Haldol Inj) 3 mg 1X  ONCE IVP  Last administered on 

5/4/20at 14:37;  Start 5/4/20 at 14:30;  Stop 5/4/20 at 14:31;  Status DC


Hydromorphone HCl (Dilaudid) 1 mg PRN Q4HRS  PRN IVP PAIN Last administered on 

5/16/20at 04:41;  Start 5/4/20 at 19:00


Potassium Chloride 75 meq/ Magnesium Sulfate 15 meq/Calcium Gluconate 8 meq/ 

Multivitamins 10 ml/Chromium/ Copper/Manganese/ Seleni/Zn 0.5 ml/ Insulin Human 

Regular 20 unit/ Total Parenteral Nutrition/Amino Acids/Dextrose/ Fat Emulsion 

Intravenous 1,920 ml @  80 mls/hr TPN  CONT IV  Last administered on 5/5/20at 

22:10;  Start 5/5/20 at 22:00;  Stop 5/6/20 at 21:59;  Status DC


Lidocaine HCl (Buffered Lidocaine 1%) 3 ml STK-MED ONCE .ROUTE ;  Start 5/6/20 

at 11:31;  Stop 5/6/20 at 11:31;  Status DC


Lidocaine HCl (Buffered Lidocaine 1%) 3 ml STK-MED ONCE .ROUTE ;  Start 5/6/20 

at 12:28;  Stop 5/6/20 at 12:29;  Status DC


Lidocaine HCl (Buffered Lidocaine 1%) 6 ml 1X  ONCE INJ  Last administered on 

5/6/20at 12:53;  Start 5/6/20 at 12:45;  Stop 5/6/20 at 12:46;  Status DC


Potassium Chloride 75 meq/ Magnesium Sulfate 15 meq/Calcium Gluconate 8 meq/ 

Multivitamins 10 ml/Chromium/ Copper/Manganese/ Seleni/Zn 0.5 ml/ Insulin Human 

Regular 20 unit/ Total Parenteral Nutrition/Amino Acids/Dextrose/ Fat Emulsion 

Intravenous 1,920 ml @  80 mls/hr TPN  CONT IV  Last administered on 5/6/20at 

22:00;  Start 5/6/20 at 22:00;  Stop 5/7/20 at 21:59;  Status DC


Potassium Chloride 75 meq/ Magnesium Sulfate 15 meq/Calcium Gluconate 8 meq/ 

Multivitamins 10 ml/Chromium/ Copper/Manganese/ Seleni/Zn 0.5 ml/ Insulin Human 

Regular 15 unit/ Total Parenteral Nutrition/Amino Acids/Dextrose/ Fat Emulsion 

Intravenous 1,920 ml @  80 mls/hr TPN  CONT IV  Last administered on 5/7/20at 

22:28;  Start 5/7/20 at 22:00;  Stop 5/8/20 at 21:59;  Status DC


Vecuronium Bromide (Norcuron Bolus) 6 mg PRN Q6HRS  PRN IV VENT ASYNCHRONY;  

Start 5/7/20 at 19:15;  Stop 5/7/20 at 19:35;  Status DC


Bumetanide (Bumex) 2 mg 1X  ONCE IV  Last administered on 5/7/20at 22:09;  Start

5/7/20 at 19:45;  Stop 5/7/20 at 19:46;  Status DC


Lidocaine HCl (Buffered Lidocaine 1%) 3 ml STK-MED ONCE .ROUTE ;  Start 5/8/20 

at 07:59;  Stop 5/8/20 at 07:59;  Status DC


Midazolam HCl (Versed) 5 mg STK-MED ONCE .ROUTE ;  Start 5/8/20 at 08:36;  Stop 

5/8/20 at 08:36;  Status DC


Fentanyl Citrate (Fentanyl 5ml Vial) 250 mcg STK-MED ONCE .ROUTE ;  Start 5/8/20

at 08:36;  Stop 5/8/20 at 08:37;  Status DC


Lidocaine HCl (Buffered Lidocaine 1%) 3 ml 1X  ONCE IJ  Last administered on 

5/8/20at 09:30;  Start 5/8/20 at 09:15;  Stop 5/8/20 at 09:16;  Status DC


Midazolam HCl (Versed) 5 mg 1X  ONCE IV  Last administered on 5/8/20at 09:30;  

Start 5/8/20 at 09:15;  Stop 5/8/20 at 09:16;  Status DC


Fentanyl Citrate (Fentanyl 5ml Vial) 250 mcg 1X  ONCE IV  Last administered on 

5/8/20at 09:30;  Start 5/8/20 at 09:15;  Stop 5/8/20 at 09:16;  Status DC


Bumetanide (Bumex) 2 mg DAILY IV  Last administered on 5/16/20at 08:57;  Start 

5/8/20 at 10:00


Potassium Chloride 75 meq/ Magnesium Sulfate 15 meq/ Multivitamins 10 

ml/Chromium/ Copper/Manganese/ Seleni/Zn 0.5 ml/ Insulin Human Regular 15 unit/ 

Total Parenteral Nutrition/Amino Acids/Dextrose/ Fat Emulsion Intravenous 1,920 

ml @  80 mls/hr TPN  CONT IV  Last administered on 5/8/20at 21:59;  Start 5/8/20

at 22:00;  Stop 5/9/20 at 21:59;  Status DC


Metoclopramide HCl (Reglan Vial) 10 mg PRN Q3HRS  PRN IVP NAUSEA/VOMITING-3rd 

choice Last administered on 5/14/20at 04:25;  Start 5/9/20 at 16:45


Potassium Chloride 75 meq/ Magnesium Sulfate 15 meq/ Multivitamins 10 

ml/Chromium/ Copper/Manganese/ Seleni/Zn 0.5 ml/ Insulin Human Regular 15 unit/ 

Total Parenteral Nutrition/Amino Acids/Dextrose/ Fat Emulsion Intravenous 1,920 

ml @  80 mls/hr TPN  CONT IV  Last administered on 5/9/20at 22:41;  Start 5/9/20

at 22:00;  Stop 5/10/20 at 21:59;  Status DC


Magnesium Sulfate 50 ml @ 25 mls/hr 1X  ONCE IV  Last administered on 5/10/20at 

10:44;  Start 5/10/20 at 09:00;  Stop 5/10/20 at 10:59;  Status DC


Potassium Chloride/Water 100 ml @  100 mls/hr 1X  ONCE IV  Last administered on 

5/10/20at 09:37;  Start 5/10/20 at 09:00;  Stop 5/10/20 at 09:59;  Status DC


Duloxetine HCl (Cymbalta) 30 mg DAILY PO  Last administered on 5/11/20at 09:48; 

Start 5/10/20 at 14:00;  Stop 5/13/20 at 10:25;  Status DC


Potassium Chloride 80 meq/ Magnesium Sulfate 20 meq/ Multivitamins 10 

ml/Chromium/ Copper/Manganese/ Seleni/Zn 0.5 ml/ Insulin Human Regular 15 unit/ 

Total Parenteral Nutrition/Amino Acids/Dextrose/ Fat Emulsion Intravenous 1,920 

ml @  80 mls/hr TPN  CONT IV  Last administered on 5/10/20at 21:42;  Start 

5/10/20 at 22:00;  Stop 5/11/20 at 21:59;  Status DC


Potassium Chloride 80 meq/ Magnesium Sulfate 20 meq/ Multivitamins 10 

ml/Chromium/ Copper/Manganese/ Seleni/Zn 0.5 ml/ Insulin Human Regular 15 unit/ 

Total Parenteral Nutrition/Amino Acids/Dextrose/ Fat Emulsion Intravenous 1,920 

ml @  80 mls/hr TPN  CONT IV  Last administered on 5/11/20at 22:20;  Start 

5/11/20 at 22:00;  Stop 5/12/20 at 21:59;  Status DC


Lidocaine HCl (Buffered Lidocaine 1%) 3 ml STK-MED ONCE .ROUTE ;  Start 5/12/20 

at 09:54;  Stop 5/12/20 at 09:55;  Status DC


Hydromorphone HCl (Dilaudid Standard PCA) 12 mg STK-MED ONCE IV ;  Start 5/1/20 

at 15:50;  Stop 5/12/20 at 11:24;  Status DC


Potassium Chloride 80 meq/ Magnesium Sulfate 20 meq/ Multivitamins 10 

ml/Chromium/ Copper/Manganese/ Seleni/Zn 0.5 ml/ Insulin Human Regular 15 unit/ 

Total Parenteral Nutrition/Amino Acids/Dextrose/ Fat Emulsion Intravenous 1,920 

ml @  80 mls/hr TPN  CONT IV  Last administered on 5/12/20at 21:40;  Start 

5/12/20 at 22:00;  Stop 5/13/20 at 21:59;  Status DC


Lidocaine HCl (Buffered Lidocaine 1%) 6 ml 1X  ONCE INJ  Last administered on 

5/12/20at 14:15;  Start 5/12/20 at 14:15;  Stop 5/12/20 at 14:16;  Status DC


Potassium Chloride 80 meq/ Magnesium Sulfate 20 meq/ Multivitamins 10 

ml/Chromium/ Copper/Manganese/ Seleni/Zn 1 ml/ Insulin Human Regular 15 unit/ 

Total Parenteral Nutrition/Amino Acids/Dextrose/ Fat Emulsion Intravenous 1,920 

ml @  80 mls/hr TPN  CONT IV  Last administered on 5/13/20at 22:04;  Start 

5/13/20 at 22:00;  Stop 5/14/20 at 21:59;  Status DC


Potassium Chloride/Water 100 ml @  100 mls/hr 1X  ONCE IV  Last administered on 

5/14/20at 11:34;  Start 5/14/20 at 11:00;  Stop 5/14/20 at 11:59;  Status DC


Potassium Chloride 90 meq/ Magnesium Sulfate 20 meq/ Multivitamins 10 

ml/Chromium/ Copper/Manganese/ Seleni/Zn 1 ml/ Insulin Human Regular 15 unit/ 

Total Parenteral Nutrition/Amino Acids/Dextrose/ Fat Emulsion Intravenous 1,920 

ml @  80 mls/hr TPN  CONT IV  Last administered on 5/14/20at 22:57;  Start 

5/14/20 at 22:00;  Stop 5/15/20 at 21:59;  Status DC


Potassium Chloride 90 meq/ Magnesium Sulfate 20 meq/ Multivitamins 10 ml/C

hromium/ Copper/Manganese/ Seleni/Zn 1 ml/ Insulin Human Regular 15 unit/ Total 

Parenteral Nutrition/Amino Acids/Dextrose/ Fat Emulsion Intravenous 1,920 ml @  

80 mls/hr TPN  CONT IV  Last administered on 5/15/20at 22:48;  Start 5/15/20 at 

22:00;  Stop 5/16/20 at 21:59





Active Scripts


Active


Reported


Bisoprolol Fumarate 5 Mg Tablet 10 Mg PO DAILY


Vitals/I & O





Vital Sign - Last 24 Hours








 5/15/20 5/15/20 5/15/20 5/15/20





 09:36 10:00 10:10 11:00


 


Pulse  100  128


 


Resp 32 14 20 30


 


B/P (MAP)  97/62 (74)  164/81 (108)


 


Pulse Ox  100  100


 


O2 Delivery  Tracheal Collar Tracheal Collar Tracheal Collar





 5/15/20 5/15/20 5/15/20 5/15/20





 12:00 12:00 13:00 14:00


 


Temp  97.5  





  97.5  


 


Pulse  130 136 110


 


Resp  32 33 22


 


B/P (MAP)  138/85 (102) 165/74 (104) 123/69 (87)


 


Pulse Ox  100 99 100


 


O2 Delivery Nasal Cannula Tracheal Collar Tracheal Collar Tracheal Collar


 


O2 Flow Rate 5.0   


 


    





    





 5/15/20 5/15/20 5/15/20 5/15/20





 15:00 15:12 15:44 16:00


 


Pulse 110   


 


Resp 26 26 20 


 


B/P (MAP) 184/87 (119)   


 


Pulse Ox 100 100  


 


O2 Delivery Tracheal Collar Tracheal Collar Nasal Cannula Nasal Cannula


 


O2 Flow Rate    5.0





 5/15/20 5/15/20 5/15/20 5/15/20





 16:00 17:00 18:00 19:00


 


Temp 98.5   





 98.5   


 


Pulse 120 116 113 110


 


Resp 20 20 24 23


 


B/P (MAP) 146/82 (103) 126/74 (91) 140/82 (101) 147/78 (101)


 


Pulse Ox 100 98 99 99


 


O2 Delivery Nasal Cannula Nasal Cannula Nasal Cannula Nasal Cannula


 


O2 Flow Rate    3.0


 


    





    





 5/15/20 5/15/20 5/15/20 5/15/20





 20:00 20:00 21:00 21:04


 


Temp 97.5   





 97.5   


 


Pulse 102  102 


 


Resp 19  24 29


 


B/P (MAP) 118/63 (81)  161/100 (120) 


 


Pulse Ox 100  100 100


 


O2 Delivery Nasal Cannula Nasal Cannula Nasal Cannula Nasal Cannula


 


O2 Flow Rate 3.0 3.0 3.0 3.0


 


    





    





 5/15/20 5/15/20 5/15/20 5/16/20





 21:34 22:00 23:00 00:00


 


Pulse  90 82 


 


Resp 22 16 15 


 


B/P (MAP)  101/61 (74) 93/52 (66) 


 


Pulse Ox 100 100 100 


 


O2 Delivery Nasal Cannula Nasal Cannula Nasal Cannula Nasal Cannula


 


O2 Flow Rate 3.0 3.0 3.0 3.0





 5/16/20 5/16/20 5/16/20 5/16/20





 00:00 01:00 02:00 03:00


 


Temp 98.0   





 98.0   


 


Pulse 88 94 86 78


 


Resp 26 17 15 15


 


B/P (MAP) 140/81 (100) 152/84 (106) 91/49 (63) 


 


Pulse Ox 100 100 100 100


 


O2 Delivery Nasal Cannula Nasal Cannula Nasal Cannula Nasal Cannula


 


O2 Flow Rate 3.0 3.0 3.0 3.0


 


    





    





 5/16/20 5/16/20 5/16/20 5/16/20





 04:00 04:00 04:41 05:00


 


Temp  98.3  





  98.3  


 


Pulse  102  


 


Resp  30 30 


 


B/P (MAP)  148/89 (108)  


 


Pulse Ox  100 100 


 


O2 Delivery Nasal Cannula Nasal Cannula Nasal Cannula Nasal Cannula


 


O2 Flow Rate 3.0 3.0 3.0 2.0


 


    





    





 5/16/20 5/16/20 5/16/20 5/16/20





 05:00 05:11 06:00 07:00


 


Pulse 108  85 77


 


Resp 16 16 14 16


 


B/P (MAP) 92/51 (65)  81/44 (56) 77/46 (56)


 


Pulse Ox 100 100 99 99


 


O2 Delivery Nasal Cannula Nasal Cannula Nasal Cannula Nasal Cannula


 


O2 Flow Rate 2.0 2.0 2.0 2.0





 5/16/20 5/16/20  





 08:00 08:00  


 


Pulse  97  


 


Resp  18  


 


B/P (MAP)  116/58 (77)  


 


Pulse Ox  99  


 


O2 Delivery Nasal Cannula Nasal Cannula  


 


O2 Flow Rate 2.0 2.0  














Intake and Output   


 


 5/15/20 5/15/20 5/16/20





 15:00 23:00 07:00


 


Intake Total  606 ml 646 ml


 


Output Total 2260 ml 1690 ml 655 ml


 


Balance -2260 ml -1084 ml -9 ml











Hemodynamically unstable?:  No


Is patient in severe pain?:  No


Is NPO status required?:  Yes











GRAEME MASSEY MD             May 16, 2020 09:05

## 2020-05-16 NOTE — PDOC
[facility] PROGRESS NOTE


Chief Complaint


Chief Complaint


Acute hypoxic Respiratory failure requiring mechanical ventilation (now 

extubated for several days but still with tracheostomy)


Tracheostomy


bilateral pleural effusions/pulm edema 


Sepsis


Severe Acute gallstone pancreatitis (not a surgical candidate at this time) with

necrosis


Acute kidney failure now requiring dialysis


Salpingitis


Gallstones (Calculus of gallbladder with acute cholecystitis without obstruc

tion)


HTN 


Leukocytosis 


Hypoxia


Uterine fibroid


Intractable pain


Intractable nausea


Covid 19 negative. 


Acute on chronic anemia 


EEG: No seizure activityFever  - better currently - intermittent could be from 

underlying pancreatitis blood cults 5/4 - neg so far


? Ileus with vomiting


Abd distention - U/S and CT reviewed s/p 0.4 L of opaque, debris-containing 

ascites was removed 5/6


Acute pancreatitis with persistent necrosis


- 4/27 status post ROBERT drain placement + C paropsilosis. s/p additional drains 

5/8


Anemia - S/p PRBCs


Cholelithiasis with thickening of the gallbladder wall.


Leucocytosis improving


JED, hyperkalemia, Metabolic acidosis off dialysis


Acute hypoxic resp failure ,bilateral pleural effusion and atelectasis


hypocalcemia 


Prediabetes


HTN


s/p trach


ESRD on HD


Hyperglycemia





History of Present Illness


History of Present Illness


5/16/2020


Patient seen and examined in the ICU


She is up in the bed but extremely weak and frail


Ask if there is "anything we can do"


I explained to her that we will do everything we can but that she is very ill 

and we need to give it time


Reviewed with nurse


Reviewed chart


She is on IV micafungin June and meropenem


She is on IV TPN


She is also receiving Precedex for sedation


She has NG to suction and ROBERT drain x5


[place] to bedside drainage


Extremely critically ill








5/15/2020


Patient seen and examined in the ICU


She is resting comfortably but sedated


Has O2 per nasal cannula


Tracheostomy is clean


She is on Precedex TPN and has an NG to suction


Chart reviewed





5/14/2020


Patient seen and examined in the ICU


She now has a speaking valve in her trach and is able to talk


She is extremely weak and shaky


Chart reviewed discussed with RN





5/13/2020


Patient seen and examined in the ICU


She is up in the chair with O2 via trach shield


Extremely anxious


Cannot talk but is trying to write things to me although I cannot understand 

what she is writing


Discussed with RN


Chart reviewed


Still on IV TPN


Still extremely ill








5/12/2020


Patient seen and examined in the ICU


She now has a trach shield


Pleasantly confused but sedated with Precedex


Chart reviewed


Discussed with RN


She has IV TPN


Still very critically ill





5/11/2020


Patient seen and examined


She remains on the vent but spontaneous respirations with 20 of pressure support

and 30% FiO2


He still has NG to suction


Appears extremely ill and weak and confused


Has IV TPN Precedex antibiotics


[place] is to bedside drainage


She has SCDs and ProCare boots


She is on IV meropenem and daptomycin and micafungin


Chart reviewed


Discussed with RN


Patient is still critically ill








Ms Tadeo is a 50yo F w/ PMHx HTN, prediabetes who presented to the emergency 

room with complaints of abdominal pain on 3/16/2020. Found with Lipase 87894, 

, , Bilirubin 1.4.


CT abdomen confirms pancreatic inflammation, peripancreatic fluid and 

inflammatory changes around the pancreas consistent with pancreatitis. 

Cholelithiasis and 1.4cm uterine fibroid as well as possible left salpingitis. 

Admitted for further care


GI, General surgery, ID, Pulm consulted.





3/17: PICC placed per IR. Renal US negative. Started on levophed. Repeat CT 

abdomen w/ necrosis; 3/18: Dialysis catheter per nephrology; 3/19: On BiPAP; 

3/20: BiPAP, dialysis; 3/21: Overnight Tmax 101.7 , still on BiPAP FiO2 40%, 

still on low dose Levophed gtt, TPN initiated. On dialysis


4/6: Tracheostomy; 4/12: S/p tracheostomy on vent spontaneous respirations with 

5 of pressure support 35% FiO2, rectal tube and a Chino, off pressors; 

4/14:Still on vent via trach. Removed PICC and CVC LIJ and replaced. CT 

chest/abd/pelvis with bilateral pleural effusion and ascites.


4/15: Renal function stable. Still on vent. More interactive today. Miming wish 

for food. Plan discussed for thoracentesis/paracentesis with daughters today. 

They were under impression patient was doing worse due to a miscommunication 

which has been clarified over the phone. 4.3L removed.


4/17: Febrile overnight 101.8F. More interactive, still on vent. Asking for ice 

by miming; 4/18: Afebrile overnight. TMax last 24 hours 100.6F. Hb 7.1. 

Interactive when awake. 4/22: Transfusion 1u PRBC (6U total since admit)


4/23-4/26: TPN and precedex, vent.


4/27: Tmax 101F overnight. Hb 8.2. HD cath out since 4/24. Alert. On vent SIMV 

35% FiO2. Surgery: ex-lap, no naif or pancreatic necrosectomy 2/2 profound 

inflammation.


4/28: Seen POD #1. Afebrile overnight. BUN 62. CBC WNL today.Remains on vent via

trach, TPN. Able to point today and indicate she wishes her daughters and Rolf 

to be involved in her care.


4/29: Hb 7.4, Na 151. Remains on vent via trach, TPN. off HD for now. Not 

tolerating trach shield well.


4/30: Negative US for UE DVT. More relaxed today. Has some increase in UOP. 

Tolerating vent well. She is requesting to eat and drink via writing. T max 100F

24 hours ago, but 101F at 1200. Right PICC placed. Speaking valve for half the 

day in [facility]


5/1: Na 153 today. Good UOP. Tmax 101F at 1200 on 4/30. She becomes a bit 

anxious and did not sleep much last night, wishes to try to sleep this morning


5/2: Able to speak well with speaking valve today. Na 153, K2.9, Mg 1.8, Cr 1. 

100.4F axillary temp overnight. Asking for food.


5/3: Afebrile overnight. ABG with pH 7.27/75/123. Hb 7.1. Na 153. PCA settings 

decreased


5/4: No acute events reported overnight, case discussed with nursing staff 

patient in no acute distress no complaints during my visit


5/5: Patient responding to verbal stimuli.  She is saturating well and not 

requiring ventilation support, no acute events reported overnight seems to be 

slowly improving


5/6: Patient requiring transfusion of packed red blood cells due to anemia, no 

evidence of acute bleeding, appreciate GI consultant recommendations


5/7: Patient required ventilatory support given that she desaturated, she is 

lying in bed in mild distress, case discussed with nursing staff, no evidence of

bleeding, h an h noted. 


5/8: Underwent IR procedure as follows


BRIEF OPERATIVE NOTE


Pre-Op Diagnosis


Pancreatitis with pseudocysts, suspected infection


Post-Op Diagnosis


same


Procedure Performed


CT abdominal Drains x 3


Surgeon


Hardy


Anesthesia Type:  Conscious Sedation


Findings


3 abdominal drains, 14F, with turbid pancreatic fluid and necrotic debris in 

each.


Complications


No immediate








5/9: Patient today somewhat restless and having bilious secretions from ET tube,

imaging studies ordered, discussed with consultant. Pretty poor prognosis, 

hopefully is not a fistula, poor surgical candidate. 





5/10: Imaging with no acute events, she seems more stable today compared to 

yesterday. Encouraged as much activity as possible patient at high risk for 

severe depression.





Vitals/I&O


Vitals/I&O:





                                   Vital Signs








  Date Time  Temp Pulse Resp B/P (MAP) Pulse Ox O2 Delivery O2 Flow Rate FiO2


 


5/16/20 11:09     99 Nasal Cannula 2.0 


 


5/16/20 11:00  94 24 166/86 (112)    


 


5/16/20 09:00 97.7       





 97.7       














                                    I & O   


 


 5/15/20 5/15/20 5/16/20





 15:00 23:00 07:00


 


Intake Total  606 ml 646 ml


 


Output Total 2260 ml 1690 ml 655 ml


 


Balance -2260 ml -1084 ml -9 ml











Physical Exam


Physical Exam:


GENERAL: Alert but extremely frail and wheezing at the bedside


HEENT:  oral cavity dry, NGT


NECK:  Trach/vent - capped


LUNGS: Diffuse wheezing which can be heard even at the bedside


HEART:  S1, S2, regular 


ABDOMEN: Less Distended, hypoactive BS, tender, + drains x 3


: [place] (4/14)


EXTREMITIES: Generalized edema, no cyanosis, SCDs bilaterally 


SKIN: Warm and dry.  No generalized rash.  


CNS: Alert and answering questions


PICC(4/30) clean


General:  No acute distress


Heart:  Regular rate, Normal S1, Normal S2, No murmurs, Gallops


Lungs:  Wheezing, Other (decrease bs)


Abdomen:  Soft, No tenderness, Other (drains in place)


Extremities:  No clubbing, No cyanosis, No edema, Normal pulses, No 

tenderness/swelling


Skin:  Other (warm, dry)





Labs


Labs:





Laboratory Tests








Test


 5/15/20


17:59 5/16/20


01:15 5/16/20


05:45 5/16/20


05:54


 


Glucose (Fingerstick)


 130 mg/dL


(70-99) 153 mg/dL


(70-99) 


 119 mg/dL


(70-99)


 


White Blood Count


 


 


 11.3 x10^3/uL


(4.0-11.0) 





 


Red Blood Count


 


 


 2.65 x10^6/uL


(3.50-5.40) 





 


Hemoglobin


 


 


 7.5 g/dL


(12.0-15.5) 





 


Hematocrit


 


 


 23.6 %


(36.0-47.0) 





 


Mean Corpuscular Volume   89 fL ()  


 


Mean Corpuscular Hemoglobin   28 pg (25-35)  


 


Mean Corpuscular Hemoglobin


Concent 


 


 32 g/dL


(31-37) 





 


Red Cell Distribution Width


 


 


 18.1 %


(11.5-14.5) 





 


Platelet Count


 


 


 524 x10^3/uL


(140-400) 





 


Neutrophils (%) (Auto)   84 % (31-73)  


 


Lymphocytes (%) (Auto)   9 % (24-48)  


 


Monocytes (%) (Auto)   5 % (0-9)  


 


Eosinophils (%) (Auto)   1 % (0-3)  


 


Basophils (%) (Auto)   0 % (0-3)  


 


Neutrophils # (Auto)


 


 


 9.5 x10^3/uL


(1.8-7.7) 





 


Lymphocytes # (Auto)


 


 


 1.0 x10^3/uL


(1.0-4.8) 





 


Monocytes # (Auto)


 


 


 0.6 x10^3/uL


(0.0-1.1) 





 


Eosinophils # (Auto)


 


 


 0.1 x10^3/uL


(0.0-0.7) 





 


Basophils # (Auto)


 


 


 0.0 x10^3/uL


(0.0-0.2) 





 


Test


 5/16/20


11:11 


 


 





 


Glucose (Fingerstick)


 162 mg/dL


(70-99) 


 


 














Review of Systems


Review of Systems:


Complains of anxiety complains of weakness





Assessment and Plan


Assessmemt and Plan


Problems


Medical Problems:


(1) Acute pancreatitis


Status: Acute  





(2) Cholelithiasis


Status: Acute  





Acute hypoxic Respiratory failure requiring mechanical ventilation (on vent 

since 3/23)


Tracheostomy


bilateral pleural effusions/pulm edema 


Sepsis


Severe Acute gallstone pancreatitis (not a surgical candidate at this time) with

necrosis


Acute kidney failure now requiring dialysis


Salpingitis


Gallstones (Calculus of gallbladder with acute cholecystitis without 

obstruction)


HTN 


Leukocytosis 


Hypoxia


Uterine fibroid


Intractable pain


Intractable nausea


Covid 19 negative. 


Acute on chronic anemia 


EEG: No seizure activityFever  - better currently - intermittent could be from 

underlying pancreatitis blood cults 5/4 - neg so far


? Ileus with vomiting


Abd distention - U/S and CT reviewed s/p 0.4 L of opaque, debris-containing 

ascites was removed 5/6


Acute pancreatitis with persistent necrosis


- 4/27 status post ROBRET drain placement + C paropsilosis. s/p additional drains 

5/8


Anemia - S/p PRBCs


Cholelithiasis with thickening of the gallbladder wall.


Leucocytosis improving


JED, hyperkalemia, Metabolic acidosis off dialysis


Acute hypoxic resp failure ,bilateral pleural effusion and atelectasis


hypocalcemia 


Prediabetes


HTN


s/p trach


ESRD on HD


Hyperglycemia








Plan


ICU monitoring


Wound care


Humidified O2 via nasal cannula for now but we can use trach shield as backup


NG suctioning


Nebulizers


Continue IV antibiotics and micafungin


Sedation with Precedex


TPN protocol


Continue [place] to bedside drainage


Tracheostomy care


Hope to eventually move towards decannulation (we have a speaking valve for now)


Trend labs


Appreciate subspecialist input


She is critically ill


Total time 34-minute





Comment


Review of Relevant


I have reviewed the following items josy (where applicable) has been applied.


Medications:





Current Medications








 Medications


  (Trade)  Dose


 Ordered  Sig/Yee


 Route


 PRN Reason  Start Time


 Stop Time Status Last Admin


Dose Admin


 


 Potassium


 Chloride 90 meq/


 Magnesium Sulfate


 20 meq/


 Multivitamins 10


 ml/Chromium/


 Copper/Manganese/


 Seleni/Zn 1 ml/


 Insulin Human


 Regular 15 unit/


 Total Parenteral


 Nutrition/Amino


 Acids/Dextrose/


 Fat Emulsion


 Intravenous  1,920 ml @ 


 80 mls/hr  TPN  CONT


 IV


   5/15/20 22:00


 5/16/20 21:59  5/15/20 22:48














Hemodynamically unstable?:  No


Is patient in severe pain?:  No


Is NPO status required?:  Yes











HECTOR MASON III DO           May 16, 2020 12:21

## 2020-05-17 VITALS — DIASTOLIC BLOOD PRESSURE: 74 MMHG | SYSTOLIC BLOOD PRESSURE: 144 MMHG

## 2020-05-17 VITALS — SYSTOLIC BLOOD PRESSURE: 152 MMHG | DIASTOLIC BLOOD PRESSURE: 70 MMHG

## 2020-05-17 VITALS — SYSTOLIC BLOOD PRESSURE: 140 MMHG | DIASTOLIC BLOOD PRESSURE: 65 MMHG

## 2020-05-17 VITALS — DIASTOLIC BLOOD PRESSURE: 60 MMHG | SYSTOLIC BLOOD PRESSURE: 112 MMHG

## 2020-05-17 VITALS — SYSTOLIC BLOOD PRESSURE: 169 MMHG | DIASTOLIC BLOOD PRESSURE: 73 MMHG

## 2020-05-17 VITALS — DIASTOLIC BLOOD PRESSURE: 69 MMHG | SYSTOLIC BLOOD PRESSURE: 146 MMHG

## 2020-05-17 VITALS — DIASTOLIC BLOOD PRESSURE: 61 MMHG | SYSTOLIC BLOOD PRESSURE: 130 MMHG

## 2020-05-17 VITALS — SYSTOLIC BLOOD PRESSURE: 119 MMHG | DIASTOLIC BLOOD PRESSURE: 66 MMHG

## 2020-05-17 VITALS — DIASTOLIC BLOOD PRESSURE: 62 MMHG | SYSTOLIC BLOOD PRESSURE: 103 MMHG

## 2020-05-17 VITALS — DIASTOLIC BLOOD PRESSURE: 73 MMHG | SYSTOLIC BLOOD PRESSURE: 113 MMHG

## 2020-05-17 VITALS — SYSTOLIC BLOOD PRESSURE: 101 MMHG | DIASTOLIC BLOOD PRESSURE: 56 MMHG

## 2020-05-17 VITALS — DIASTOLIC BLOOD PRESSURE: 49 MMHG | SYSTOLIC BLOOD PRESSURE: 109 MMHG

## 2020-05-17 VITALS — SYSTOLIC BLOOD PRESSURE: 166 MMHG | DIASTOLIC BLOOD PRESSURE: 94 MMHG

## 2020-05-17 VITALS — DIASTOLIC BLOOD PRESSURE: 84 MMHG | SYSTOLIC BLOOD PRESSURE: 151 MMHG

## 2020-05-17 VITALS — SYSTOLIC BLOOD PRESSURE: 97 MMHG | DIASTOLIC BLOOD PRESSURE: 57 MMHG

## 2020-05-17 VITALS — DIASTOLIC BLOOD PRESSURE: 56 MMHG | SYSTOLIC BLOOD PRESSURE: 100 MMHG

## 2020-05-17 VITALS — DIASTOLIC BLOOD PRESSURE: 92 MMHG | SYSTOLIC BLOOD PRESSURE: 181 MMHG

## 2020-05-17 VITALS — DIASTOLIC BLOOD PRESSURE: 54 MMHG | SYSTOLIC BLOOD PRESSURE: 90 MMHG

## 2020-05-17 VITALS — DIASTOLIC BLOOD PRESSURE: 65 MMHG | SYSTOLIC BLOOD PRESSURE: 112 MMHG

## 2020-05-17 VITALS — SYSTOLIC BLOOD PRESSURE: 121 MMHG | DIASTOLIC BLOOD PRESSURE: 67 MMHG

## 2020-05-17 VITALS — DIASTOLIC BLOOD PRESSURE: 64 MMHG | SYSTOLIC BLOOD PRESSURE: 113 MMHG

## 2020-05-17 VITALS — DIASTOLIC BLOOD PRESSURE: 77 MMHG | SYSTOLIC BLOOD PRESSURE: 137 MMHG

## 2020-05-17 VITALS — SYSTOLIC BLOOD PRESSURE: 167 MMHG | DIASTOLIC BLOOD PRESSURE: 87 MMHG

## 2020-05-17 VITALS — DIASTOLIC BLOOD PRESSURE: 79 MMHG | SYSTOLIC BLOOD PRESSURE: 204 MMHG

## 2020-05-17 LAB
ALBUMIN SERPL-MCNC: 2.2 G/DL (ref 3.4–5)
ALBUMIN/GLOB SERPL: 0.5 {RATIO} (ref 1–1.7)
ALP SERPL-CCNC: 109 U/L (ref 46–116)
ALT SERPL-CCNC: 27 U/L (ref 14–59)
ANION GAP SERPL CALC-SCNC: 1 MMOL/L (ref 6–14)
AST SERPL-CCNC: 29 U/L (ref 15–37)
BASOPHILS # BLD AUTO: 0 X10^3/UL (ref 0–0.2)
BASOPHILS NFR BLD: 0 % (ref 0–3)
BILIRUB SERPL-MCNC: 0.4 MG/DL (ref 0.2–1)
BUN SERPL-MCNC: 26 MG/DL (ref 7–20)
BUN/CREAT SERPL: 52 (ref 6–20)
CALCIUM SERPL-MCNC: 9.2 MG/DL (ref 8.5–10.1)
CHLORIDE SERPL-SCNC: 99 MMOL/L (ref 98–107)
CO2 SERPL-SCNC: 40 MMOL/L (ref 21–32)
CREAT SERPL-MCNC: 0.5 MG/DL (ref 0.6–1)
EOSINOPHIL NFR BLD: 0.2 X10^3/UL (ref 0–0.7)
EOSINOPHIL NFR BLD: 1 % (ref 0–3)
ERYTHROCYTE [DISTWIDTH] IN BLOOD BY AUTOMATED COUNT: 18.1 % (ref 11.5–14.5)
GFR SERPLBLD BASED ON 1.73 SQ M-ARVRAT: 131.1 ML/MIN
GLOBULIN SER-MCNC: 4.5 G/DL (ref 2.2–3.8)
GLUCOSE SERPL-MCNC: 127 MG/DL (ref 70–99)
HCT VFR BLD CALC: 25.6 % (ref 36–47)
HGB BLD-MCNC: 8.3 G/DL (ref 12–15.5)
LYMPHOCYTES # BLD: 3.5 X10^3/UL (ref 1–4.8)
LYMPHOCYTES NFR BLD AUTO: 25 % (ref 24–48)
MCH RBC QN AUTO: 29 PG (ref 25–35)
MCHC RBC AUTO-ENTMCNC: 32 G/DL (ref 31–37)
MCV RBC AUTO: 89 FL (ref 79–100)
MONO #: 0.9 X10^3/UL (ref 0–1.1)
MONOCYTES NFR BLD: 6 % (ref 0–9)
NEUT #: 9.6 X10^3/UL (ref 1.8–7.7)
NEUTROPHILS NFR BLD AUTO: 68 % (ref 31–73)
PLATELET # BLD AUTO: 497 X10^3/UL (ref 140–400)
POTASSIUM SERPL-SCNC: 4.5 MMOL/L (ref 3.5–5.1)
PROT SERPL-MCNC: 6.7 G/DL (ref 6.4–8.2)
RBC # BLD AUTO: 2.87 X10^6/UL (ref 3.5–5.4)
SODIUM SERPL-SCNC: 140 MMOL/L (ref 136–145)
WBC # BLD AUTO: 14.2 X10^3/UL (ref 4–11)

## 2020-05-17 RX ADMIN — DEXMEDETOMIDINE HYDROCHLORIDE PRN MLS/HR: 100 INJECTION, SOLUTION, CONCENTRATE INTRAVENOUS at 21:29

## 2020-05-17 RX ADMIN — DEXMEDETOMIDINE HYDROCHLORIDE PRN MLS/HR: 100 INJECTION, SOLUTION, CONCENTRATE INTRAVENOUS at 11:09

## 2020-05-17 RX ADMIN — BUMETANIDE SCH MG: 0.25 INJECTION INTRAMUSCULAR; INTRAVENOUS at 09:09

## 2020-05-17 RX ADMIN — DEXTROSE SCH MLS/HR: 50 INJECTION, SOLUTION INTRAVENOUS at 11:07

## 2020-05-17 RX ADMIN — DEXMEDETOMIDINE HYDROCHLORIDE PRN MLS/HR: 100 INJECTION, SOLUTION, CONCENTRATE INTRAVENOUS at 00:43

## 2020-05-17 RX ADMIN — HYDROMORPHONE HYDROCHLORIDE PRN MG: 2 INJECTION INTRAMUSCULAR; INTRAVENOUS; SUBCUTANEOUS at 14:05

## 2020-05-17 RX ADMIN — DAPTOMYCIN SCH MLS/HR: 500 INJECTION, POWDER, LYOPHILIZED, FOR SOLUTION INTRAVENOUS at 10:28

## 2020-05-17 RX ADMIN — INSULIN LISPRO SCH UNITS: 100 INJECTION, SOLUTION INTRAVENOUS; SUBCUTANEOUS at 12:02

## 2020-05-17 RX ADMIN — MEROPENEM SCH MLS/HR: 1 INJECTION, POWDER, FOR SOLUTION INTRAVENOUS at 21:28

## 2020-05-17 RX ADMIN — DEXMEDETOMIDINE HYDROCHLORIDE PRN MLS/HR: 100 INJECTION, SOLUTION, CONCENTRATE INTRAVENOUS at 23:50

## 2020-05-17 RX ADMIN — INSULIN LISPRO SCH UNITS: 100 INJECTION, SOLUTION INTRAVENOUS; SUBCUTANEOUS at 23:50

## 2020-05-17 RX ADMIN — HYDROMORPHONE HYDROCHLORIDE PRN MG: 2 INJECTION INTRAMUSCULAR; INTRAVENOUS; SUBCUTANEOUS at 23:43

## 2020-05-17 RX ADMIN — HYDROMORPHONE HYDROCHLORIDE PRN MG: 2 INJECTION INTRAMUSCULAR; INTRAVENOUS; SUBCUTANEOUS at 07:57

## 2020-05-17 RX ADMIN — HYDROMORPHONE HYDROCHLORIDE PRN MG: 2 INJECTION INTRAMUSCULAR; INTRAVENOUS; SUBCUTANEOUS at 02:11

## 2020-05-17 RX ADMIN — Medication PRN EACH: at 10:36

## 2020-05-17 RX ADMIN — INSULIN LISPRO SCH UNITS: 100 INJECTION, SOLUTION INTRAVENOUS; SUBCUTANEOUS at 00:47

## 2020-05-17 RX ADMIN — INSULIN LISPRO SCH UNITS: 100 INJECTION, SOLUTION INTRAVENOUS; SUBCUTANEOUS at 06:00

## 2020-05-17 RX ADMIN — PANTOPRAZOLE SODIUM SCH MG: 40 INJECTION, POWDER, FOR SOLUTION INTRAVENOUS at 09:08

## 2020-05-17 RX ADMIN — MEROPENEM SCH MLS/HR: 1 INJECTION, POWDER, FOR SOLUTION INTRAVENOUS at 06:47

## 2020-05-17 RX ADMIN — INSULIN LISPRO SCH UNITS: 100 INJECTION, SOLUTION INTRAVENOUS; SUBCUTANEOUS at 17:48

## 2020-05-17 RX ADMIN — MEROPENEM SCH MLS/HR: 1 INJECTION, POWDER, FOR SOLUTION INTRAVENOUS at 14:04

## 2020-05-17 RX ADMIN — ENOXAPARIN SODIUM SCH MG: 40 INJECTION SUBCUTANEOUS at 17:48

## 2020-05-17 RX ADMIN — DEXMEDETOMIDINE HYDROCHLORIDE PRN MLS/HR: 100 INJECTION, SOLUTION, CONCENTRATE INTRAVENOUS at 06:47

## 2020-05-17 RX ADMIN — DEXMEDETOMIDINE HYDROCHLORIDE PRN MLS/HR: 100 INJECTION, SOLUTION, CONCENTRATE INTRAVENOUS at 14:05

## 2020-05-17 RX ADMIN — BACITRACIN SCH MLS/HR: 5000 INJECTION, POWDER, FOR SOLUTION INTRAMUSCULAR at 13:37

## 2020-05-17 NOTE — RAD
Supine abdomen.

 

HISTORY: Distention, leukocytosis

 

Supine view was taken of the abdomen. There is an NG tube in the stomach. 

There is not evidence of a bowel obstruction. There are 3 drainage tubes 

in the abdomen 2 on the right side of the abdomen one on the left side of 

the pelvis. Drainage tubes are unchanged in position. There has been an 

improvement in the bowel distention compared to May 9.

 

IMPRESSION:

1. Drainage tubes noted in the abdomen.

2. Improved bowel distention. No bowel obstruction

3. NG tube in the stomach.

 

Electronically signed by: Sourav Frias MD (5/17/2020 8:23 AM) [id number]

## 2020-05-17 NOTE — PDOC
PULMONARY PROGRESS NOTES


Subjective


Patient intubated on 3/23 , s/p trach 4/6, awake alert follows commands, is 

weak, small trach secretion, trach has been capped over 48 hrs


placed on cap trach


s/p left tap 5/12 , 3 litres removed


Vitals





Vital Signs








  Date Time  Temp Pulse Resp B/P (MAP) Pulse Ox O2 Delivery O2 Flow Rate FiO2


 


5/17/20 04:00      Nasal Cannula 2.0 


 


5/17/20 04:00 98.1 104 23 121/67 (85) 100   





 98.1       








ROS:  No Chest Pain, No Abdominal Pain, No Increase Cough


General:  Alert, No acute distress


HEENT:  Other (nc at perrl     neck trach site ok  no lad  no thyromegaly)


Lungs:  Wheezing, Other (decrease bs)


Cardiovascular:  S1, S2


Abdomen:  Soft, Non-tender, Other (distended)


Neuro Exam:  Alert


Extremities:  Other (+3 generalized edema )


Skin:  Warm, Dry


Labs





Laboratory Tests








Test


 5/15/20


06:40 5/15/20


06:43 5/15/20


12:11 5/15/20


17:59


 


White Blood Count


 9.0 x10^3/uL


(4.0-11.0) 


 


 





 


Red Blood Count


 2.56 x10^6/uL


(3.50-5.40) 


 


 





 


Hemoglobin


 7.2 g/dL


(12.0-15.5) 


 


 





 


Hematocrit


 22.8 %


(36.0-47.0) 


 


 





 


Mean Corpuscular Volume 89 fL ()    


 


Mean Corpuscular Hemoglobin 28 pg (25-35)    


 


Mean Corpuscular Hemoglobin


Concent 32 g/dL


(31-37) 


 


 





 


Red Cell Distribution Width


 17.9 %


(11.5-14.5) 


 


 





 


Platelet Count


 489 x10^3/uL


(140-400) 


 


 





 


Neutrophils (%) (Auto) 79 % (31-73)    


 


Lymphocytes (%) (Auto) 13 % (24-48)    


 


Monocytes (%) (Auto) 6 % (0-9)    


 


Eosinophils (%) (Auto) 2 % (0-3)    


 


Basophils (%) (Auto) 0 % (0-3)    


 


Neutrophils # (Auto)


 7.1 x10^3/uL


(1.8-7.7) 


 


 





 


Lymphocytes # (Auto)


 1.2 x10^3/uL


(1.0-4.8) 


 


 





 


Monocytes # (Auto)


 0.5 x10^3/uL


(0.0-1.1) 


 


 





 


Eosinophils # (Auto)


 0.2 x10^3/uL


(0.0-0.7) 


 


 





 


Basophils # (Auto)


 0.0 x10^3/uL


(0.0-0.2) 


 


 





 


Sodium Level


 143 mmol/L


(136-145) 


 


 





 


Potassium Level


 4.1 mmol/L


(3.5-5.1) 


 


 





 


Chloride Level


 103 mmol/L


() 


 


 





 


Carbon Dioxide Level


 38 mmol/L


(21-32) 


 


 





 


Anion Gap 2 (6-14)    


 


Blood Urea Nitrogen


 25 mg/dL


(7-20) 


 


 





 


Creatinine


 0.6 mg/dL


(0.6-1.0) 


 


 





 


Estimated GFR


(Cockcroft-Gault) 106.3 


 


 


 





 


Glucose Level


 115 mg/dL


(70-99) 


 


 





 


Calcium Level


 8.5 mg/dL


(8.5-10.1) 


 


 





 


Glucose (Fingerstick)


 


 110 mg/dL


(70-99) 208 mg/dL


(70-99) 130 mg/dL


(70-99)


 


Test


 5/16/20


01:15 5/16/20


05:45 5/16/20


05:54 5/16/20


11:11


 


Glucose (Fingerstick)


 153 mg/dL


(70-99) 


 119 mg/dL


(70-99) 162 mg/dL


(70-99)


 


White Blood Count


 


 11.3 x10^3/uL


(4.0-11.0) 


 





 


Red Blood Count


 


 2.65 x10^6/uL


(3.50-5.40) 


 





 


Hemoglobin


 


 7.5 g/dL


(12.0-15.5) 


 





 


Hematocrit


 


 23.6 %


(36.0-47.0) 


 





 


Mean Corpuscular Volume  89 fL ()   


 


Mean Corpuscular Hemoglobin  28 pg (25-35)   


 


Mean Corpuscular Hemoglobin


Concent 


 32 g/dL


(31-37) 


 





 


Red Cell Distribution Width


 


 18.1 %


(11.5-14.5) 


 





 


Platelet Count


 


 524 x10^3/uL


(140-400) 


 





 


Neutrophils (%) (Auto)  84 % (31-73)   


 


Lymphocytes (%) (Auto)  9 % (24-48)   


 


Monocytes (%) (Auto)  5 % (0-9)   


 


Eosinophils (%) (Auto)  1 % (0-3)   


 


Basophils (%) (Auto)  0 % (0-3)   


 


Neutrophils # (Auto)


 


 9.5 x10^3/uL


(1.8-7.7) 


 





 


Lymphocytes # (Auto)


 


 1.0 x10^3/uL


(1.0-4.8) 


 





 


Monocytes # (Auto)


 


 0.6 x10^3/uL


(0.0-1.1) 


 





 


Eosinophils # (Auto)


 


 0.1 x10^3/uL


(0.0-0.7) 


 





 


Basophils # (Auto)


 


 0.0 x10^3/uL


(0.0-0.2) 


 





 


Test


 5/16/20


17:46 5/17/20


00:41 


 





 


Glucose (Fingerstick)


 144 mg/dL


(70-99) 177 mg/dL


(70-99) 


 











Laboratory Tests








Test


 5/16/20


11:11 5/16/20


17:46 5/17/20


00:41


 


Glucose (Fingerstick)


 162 mg/dL


(70-99) 144 mg/dL


(70-99) 177 mg/dL


(70-99)








Medications





Active Scripts








 Medications  Dose


 Route/Sig


 Max Daily Dose Days Date Category


 


 Bisoprolol


 Fumarate 5 Mg


 Tablet  10 Mg


 PO DAILY


   3/16/20 Reported








Comments


CXR  reviewed.





Impression


.


IMPRESSION:


1.  Acute hypoxemic respiratory failure secondary to ARDS status post trach,


2.  Gallstone pancreatitis


3.  Severe metabolic acidosis.stable


4.  Acute kidney injury-stable, Off HD-- continue to improve 


5.  Acute gallstone pancreatitis.


6.  Hypoalbuminemia.


7.  Moderate persistent effusions, s/p left thora  5/12


8.  Fever-  Per ID, per surgery--resolved 


9.  Chronic anemia


10. Covid 19 testing negative


11. Moderate to large ascites-S/P paracentisis


12.S/P paracentisis with 4 liters removed on 4/15/20


13. S/P IR drain placement on 5/8/2020





Plan


.


s/p  thoracentesis, 5/12, 3 litres removed


cap trach as tolerated


Follow surgery recs-- S/P 3 drain placed in IR on 5/8/2020


Follow ID recs for ABX


Follow nephrology recs 


Continue TPN 


DVT/GI PPX: heparin SQ/ protonix 


D/W RN and RT, pt





CODE:FULL











MAN BLAKE MD               May 17, 2020 06:00

## 2020-05-17 NOTE — NUR
After Pt today at 1400 patient agitated and anxious, 's, 's, RR 40's. Gave 
bolus of Precedex and 1mg IV hydromorphone, and I sat with the patient trying to help her 
calm down. This was nothing different from yesterday after PT however, today the patient was 
unable to calm down. At 1445 patients HR still 150-160's 's. I got an order for 2mg 
Ativan and administered it at 1459. Patients anxiety subsided but HR was still in the 150's. 
Got a an order for 1x 5mg IV Lopressor at 1531. At 1815 while transferring patient from the 
chair to the bed patent was slumped over to her left side, left side seemed flaccid, would 
not raise her arm when asked however patient did not have facial droop. Dr sharon andrew gave 
order to consult Neuro for possible stroke. Talked to Dr Aldana about findings, discussed that 
I wasn't sure if it was stroke or not because of the Ativan that was given and the lack of 
facial droop. Dr Aldana gave order for IV Ativan 1-2 mg if needed and to order a CT Brain non 
contrast in the morning if the patient did not improve. Patient now at 1930 able to move 
both arms, and talking. will defer to neuro rounding in the morning as get CT or not. 

-------------------------------------------------------------------------------

Addendum: 05/17/20 at 1944 by GAMAL GREENWOOD RN

-------------------------------------------------------------------------------

Chato Aldana noted that neuro was on case earlier for seizures. He did give me a free text order 
for 1x IV 600mg Dilantin if patient experiences any seizures tonight.

## 2020-05-17 NOTE — NUR
Pharmacy TPN Dosing Note



S: JESENIA RICH is a 49 year old F Currently receiving Central Continuous TPN started 
03/18/20



B:Pertinent PMH:   Necrotizing pancreatitis

Current diet: NPO 



LABS:

Sodium:    140 

Potassium: 4.5 

Chloride:  99 

Calcium:   9.2 

Corrected Calcium: 10.64 

Magnesium: 1.9 

CO2:       38 

SCr:       0.6 

Glucose:   119-177 

Albumin:   2.2 

AST:       50 

ALT:       50 



TPN FORMULA:

TPN TYPE:  Central Continuous

AMINO ACIDS:         90 gm

DEXTROSE:            250 gm

LIPIDS:              30 gm

SODIUM CHLORIDE:     - mEq

SODIUM ACETATE:      - mEq

SODIUM PHOSPHATE:    - mmol

POTASSIUM CHLORIDE:  90 mEq

POTASSIUM ACETATE:   - mEq

POTASSIUM PHOSPHATE: - mmol

MAGNESIUM:           20 mEq

CALCIUM:             - mEq

INSULIN:             15 units

MULTIPLE VITAMIN:    10 ml

TRACE ELEMENTS:      1 ml(s)



TPN PLAN:  5/17 CONT SAME TPN





R: Continue TPN 

Will monitor electrolytes, glucose, and tolerance to TPN.



 MURPHY ACOSTA RP, 05/17/20 1037

## 2020-05-17 NOTE — PDOC
Infectious Disease Note


Subjective


Subjective


feeling better says occ nausea and min pain


Still distended but less


off vent, trach o2





ROS


ROS


o/w neg





Vital Sign


Vital Signs





Vital Signs








  Date Time  Temp Pulse Resp B/P (MAP) Pulse Ox O2 Delivery O2 Flow Rate FiO2


 


5/17/20 04:00      Nasal Cannula 2.0 


 


5/17/20 04:00 98.1 104 23 121/67 (85) 100   





 98.1       











Physical Exam


PHYSICAL EXAM


GENERAL: Alert - coop 


HEENT:  oral cavity dry, NGT


NECK:  Trach/vent - capped


LUNGS: CTA


HEART:  S1, S2, regular 


ABDOMEN: mod Distended, hypoactive BS, tender, + drains x 3


: [place] (4/14)


EXTREMITIES: Generalized edema, no cyanosis, SCDs bilaterally 


SKIN: Warm and dry.  No generalized rash.  


CNS: Alert and answering questions


PICC(4/30) clean





Labs


Lab





Laboratory Tests








Test


 5/16/20


11:11 5/16/20


17:46 5/17/20


00:41 5/17/20


07:00


 


Glucose (Fingerstick)


 162 mg/dL


(70-99) 144 mg/dL


(70-99) 177 mg/dL


(70-99) 





 


White Blood Count


 


 


 


 14.2 x10^3/uL


(4.0-11.0)


 


Red Blood Count


 


 


 


 2.87 x10^6/uL


(3.50-5.40)


 


Hemoglobin


 


 


 


 8.3 g/dL


(12.0-15.5)


 


Hematocrit


 


 


 


 25.6 %


(36.0-47.0)


 


Mean Corpuscular Volume    89 fL () 


 


Mean Corpuscular Hemoglobin    29 pg (25-35) 


 


Mean Corpuscular Hemoglobin


Concent 


 


 


 32 g/dL


(31-37)


 


Red Cell Distribution Width


 


 


 


 18.1 %


(11.5-14.5)


 


Platelet Count


 


 


 


 497 x10^3/uL


(140-400)


 


Neutrophils (%) (Auto)    68 % (31-73) 


 


Lymphocytes (%) (Auto)    25 % (24-48) 


 


Monocytes (%) (Auto)    6 % (0-9) 


 


Eosinophils (%) (Auto)    1 % (0-3) 


 


Basophils (%) (Auto)    0 % (0-3) 


 


Neutrophils # (Auto)


 


 


 


 9.6 x10^3/uL


(1.8-7.7)


 


Lymphocytes # (Auto)


 


 


 


 3.5 x10^3/uL


(1.0-4.8)


 


Monocytes # (Auto)


 


 


 


 0.9 x10^3/uL


(0.0-1.1)


 


Eosinophils # (Auto)


 


 


 


 0.2 x10^3/uL


(0.0-0.7)


 


Basophils # (Auto)


 


 


 


 0.0 x10^3/uL


(0.0-0.2)


 


Sodium Level


 


 


 


 140 mmol/L


(136-145)


 


Potassium Level


 


 


 


 4.5 mmol/L


(3.5-5.1)


 


Chloride Level


 


 


 


 99 mmol/L


()


 


Carbon Dioxide Level


 


 


 


 40 mmol/L


(21-32)


 


Anion Gap    1 (6-14) 


 


Blood Urea Nitrogen


 


 


 


 26 mg/dL


(7-20)


 


Creatinine


 


 


 


 0.5 mg/dL


(0.6-1.0)


 


Estimated GFR


(Cockcroft-Gault) 


 


 


 131.1 





 


BUN/Creatinine Ratio    52 (6-20) 


 


Glucose Level


 


 


 


 127 mg/dL


(70-99)


 


Calcium Level


 


 


 


 9.2 mg/dL


(8.5-10.1)


 


Total Bilirubin


 


 


 


 0.4 mg/dL


(0.2-1.0)


 


Aspartate Amino Transf


(AST/SGOT) 


 


 


 29 U/L (15-37) 





 


Alanine Aminotransferase


(ALT/SGPT) 


 


 


 27 U/L (14-59) 





 


Alkaline Phosphatase


 


 


 


 109 U/L


()


 


Total Protein


 


 


 


 6.7 g/dL


(6.4-8.2)


 


Albumin


 


 


 


 2.2 g/dL


(3.4-5.0)


 


Albumin/Globulin Ratio    0.5 (1.0-1.7) 








Micro


U/S 5/4





IMPRESSION: Complex fluid collections within the right and lower 


quadrants. The abdominal visceral and vascular structures are not formally


assessed on this exam.





CT Scan 5/4





IMPRESSION:


 





1. Findings consistent with sequelae of severe acute pancreatitis with 


enlarging peripancreatic and intra-abdominal fluid collections as 


described


 


2. Interval placement of a drain in the ventral abdominal wall with and 


partial evacuation of the ventral extraperitoneal fluid collection 


previously evident.











4/27


Operative Note:


After obtaining informed consent, patient was taken to OR, induced under GETA 

and prepped in the usual fashion.  5 mm port placed umbilical and right mid 

abdomen, all under laparoscopic guidance.  Large amount of ascites encountered 

and aspirated off.  Fluid was clear with some whitish debris.  Viscera was 

completely locked in with obliteration of all planes, preventing any significant

exploration.  Copious irrigation.





Given patient's overall clinical improvement, favor against open procedure and 

attempt at cholecystectomy and/or necrosectomy, given high risk of c

omplications.  Cholecystectomy will need to be performed, but favor waiting 3 

months.





19 ROBERT drain placed and secured with 3 0 nylon.  Skin repaired with 4 0 monocryl.

 Dressing placed.





Patient tolerated procedure well and sent to PACU in stable condition.  All 

counts correct.  Wound class is 4.
































CT Chest Abd/pelv 4/14


CT CHEST:


CT scan the chest was done without contrast. There is a tracheostomy tube 


in good position. There are large bilateral pleural effusions. There is 


atelectasis in both lung bases. There is no mediastinal adenopathy. Right 


arm PICC line extends to the superior vena cava. There is a temporary 


dialysis catheter extending to the vena cava near the atrium.


 


IMPRESSION:


1. Large bilateral effusions.


2. Atelectasis of both lungs.


 


End impression


 


CT abdomen pelvis


 


CT scan the abdomen pelvis was done following CT chest with contrast. NG 


tube extends into the stomach. Spleen is normal. There is no mass or 


hydronephrosis in the kidneys. There is a gallstone the gallbladder. A 


focal liver lesion is not identified. There is diffuse necrosis of the 


pancreas. There is peripancreatic fluid. The pattern is similar to the 


recent study. There is fluid in the paracolic gutters. There is 


retroperitoneal fluid surrounding the kidneys. Ascites extends into the 


pelvis. Ascites is similar to the prior study. There is no bowel 


obstruction. There is no free air.


 


IMPRESSION:


1. Diffuse pancreatic necrosis.


2. A extensive retroperitoneal fluid and inflammation.


3. Cholelithiasis.


4. Moderate to large volume ascites with little change.



































CT A/P, 4/9


IMPRESSION:


1. Increased ascites.


2. 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


at the pancreas


3. Cholelithiasis with thickening of the gallbladder wall.


4. Persistent pleural effusions and atelectasis in the lung bases.





Objective


Assessment


Mild Leukocytosis but clinically looks well


Fever  - better currently - intermittent could be from underlying pancreatitis 

blood cults 5/4 - neg so far


? Ileus with vomiting


Abd distention - U/S and CT reviewed s/p 0.4 L of opaque, debris-containing asc

ites was removed 5/6


S/p thoracenteis 5/12


Acute pancreatitis with persistent  necrosis


CT a/p 4/9


    Increased ascites. Persistent evidence of necrotizing pancreatitis with 

fluid and phlegmon


   at the pancreas


   4/27 status post ROBERT drain placement - C paropsilosis on cult and 5/6; Cult 

line tip 4/30 - neg


Anemia - S/p PRBCs


Cholelithiasis with thickening of the gallbladder wall.


Leucocytosis improving


JED,Hyperkalemia, Metabolic acidosis off dialysis


Acute hypoxic resp failure ,bilateral pleural effusion and atelectasis


hypocalcemia 


Prediabetes


HTN


s/p trach





Plan


Plan of Care


Blood cults times 2


Add Zyvox/Dapto


KUB





Cont merrem 4/8 - wean soon


Continue micafungin


Labs in am


f/u cultures 


Maintain aspiration precaution


Supportive care





D/w nursing











RASHAWN ROSEN MD              May 17, 2020 08:00

## 2020-05-17 NOTE — PDOC
[facility] PROGRESS NOTE


Chief Complaint


Chief Complaint


Acute hypoxic Respiratory failure requiring mechanical ventilation (now 

extubated for several days but still with tracheostomy)


Tracheostomy


bilateral pleural effusions/pulm edema 


Sepsis


Severe Acute gallstone pancreatitis (not a surgical candidate at this time) with

necrosis


Acute kidney failure now requiring dialysis


Salpingitis


Gallstones (Calculus of gallbladder with acute cholecystitis without obstruc

tion)


HTN 


Leukocytosis 


Hypoxia


Uterine fibroid


Intractable pain


Intractable nausea


Covid 19 negative. 


Acute on chronic anemia 


EEG: No seizure activityFever  - better currently - intermittent could be from 

underlying pancreatitis blood cults 5/4 - neg so far


? Ileus with vomiting


Abd distention - U/S and CT reviewed s/p 0.4 L of opaque, debris-containing 

ascites was removed 5/6


Acute pancreatitis with persistent necrosis


- 4/27 status post ROBERT drain placement + C paropsilosis. s/p additional drains 

5/8


Anemia - S/p PRBCs


Cholelithiasis with thickening of the gallbladder wall.


Leucocytosis improving


JED, hyperkalemia, Metabolic acidosis off dialysis


Acute hypoxic resp failure ,bilateral pleural effusion and atelectasis


hypocalcemia 


Prediabetes


HTN


s/p trach


ESRD on HD


Hyperglycemia





History of Present Illness


History of Present Illness


5/17/2020


Patient seen and examined in the ICU


She is up in bed sleeping awoke I really do not understand what she is saying to

her but she is trying to talk


Chart reviewed


Discussed with RN


She is on IV Zosyn IV micafungin and IV daptomycin


Also getting TPN


Sedated with Precedex


She also gets Dilaudid every 4


She remains critically ill








5/16/2020


Patient seen and examined in the ICU


She is up in the bed but extremely weak and frail


Ask if there is "anything we can do"


I explained to her that we will do everything we can but that she is very ill 

and we need to give it time


Reviewed with nurse


Reviewed chart


She is on IV micafungin June and meropenem


She is on IV TPN


She is also receiving Precedex for sedation


She has NG to suction and ROBERT drain x5


Ch[place] to bedside drainage


Extremely critically ill








5/15/2020


Patient seen and examined in the ICU


She is resting comfortably but sedated


Has O2 per nasal cannula


Tracheostomy is clean


She is on Precedex TPN and has an NG to suction


Chart reviewed





5/14/2020


Patient seen and examined in the ICU


She now has a speaking valve in her trach and is able to talk


She is extremely weak and shaky


Chart reviewed discussed with RN





5/13/2020


Patient seen and examined in the ICU


She is up in the chair with O2 via trach shield


Extremely anxious


Cannot talk but is trying to write things to me although I cannot understand 

what she is writing


Discussed with RN


Chart reviewed


Still on IV TPN


Still extremely ill








5/12/2020


Patient seen and examined in the ICU


She now has a trach shield


Pleasantly confused but sedated with Precedex


Chart reviewed


Discussed with RN


She has IV TPN


Still very critically ill





5/11/2020


Patient seen and examined


She remains on the vent but spontaneous respirations with 20 of pressure support

and 30% FiO2


He still has NG to suction


Appears extremely ill and weak and confused


Has IV TPN Precedex antibiotics


[place] is to bedside drainage


She has SCDs and ProCare boots


She is on IV meropenem and daptomycin and micafungin


Chart reviewed


Discussed with RN


Patient is still critically ill








Ms Tadeo is a 48yo F w/ PMHx HTN, prediabetes who presented to the emergency 

room with complaints of abdominal pain on 3/16/2020. Found with Lipase 79559, 

, , Bilirubin 1.4.


CT abdomen confirms pancreatic inflammation, peripancreatic fluid and 

inflammatory changes around the pancreas consistent with pancreatitis. 

Cholelithiasis and 1.4cm uterine fibroid as well as possible left salpingitis. 

Admitted for further care


GI, General surgery, ID, Pulm consulted.





3/17: PICC placed per IR. Renal US negative. Started on levophed. Repeat CT 

abdomen w/ necrosis; 3/18: Dialysis catheter per nephrology; 3/19: On BiPAP; 

3/20: BiPAP, dialysis; 3/21: Overnight Tmax 101.7 , still on BiPAP FiO2 40%, 

still on low dose Levophed gtt, TPN initiated. On dialysis


4/6: Tracheostomy; 4/12: S/p tracheostomy on vent spontaneous respirations with 

5 of pressure support 35% FiO2, rectal tube and a Chino, off pressors; 

4/14:Still on vent via trach. Removed PICC and CVC [facility] and replaced. CT ch

est/abd/pelvis with bilateral pleural effusion and ascites.


4/15: Renal function stable. Still on vent. More interactive today. Miming wish 

for food. Plan discussed for thoracentesis/paracentesis with daughters today. 

They were under impression patient was doing worse due to a miscommunication 

which has been clarified over the phone. 4.3L removed.


4/17: Febrile overnight 101.8F. More interactive, still on vent. Asking for ice 

by miming; 4/18: Afebrile overnight. TMax last 24 hours 100.6F. Hb 7.1. 

Interactive when awake. 4/22: Transfusion 1u PRBC (6U total since admit)


4/23-4/26: TPN and precedex, vent.


4/27: Tmax 101F overnight. Hb 8.2. HD cath out since 4/24. Alert. On vent SIMV 

35% FiO2. Surgery: ex-lap, no naif or pancreatic necrosectomy 2/2 profound inf

lammation.


4/28: Seen POD #1. Afebrile overnight. BUN 62. CBC WNL today.Remains on vent via

trach, TPN. Able to point today and indicate she wishes her daughters and Rolf 

to be involved in her care.


4/29: Hb 7.4, Na 151. Remains on vent via trach, TPN. off HD for now. Not 

tolerating trach shield well.


4/30: Negative US for UE DVT. More relaxed today. Has some increase in UOP. 

Tolerating vent well. She is requesting to eat and drink via writing. T max 100F

24 hours ago, but 101F at 1200. Right PICC placed. Speaking valve for half the 

day in [facility]


5/1: Na 153 today. Good UOP. Tmax 101F at 1200 on 4/30. She becomes a bit 

anxious and did not sleep much last night, wishes to try to sleep this morning


5/2: Able to speak well with speaking valve today. Na 153, K2.9, Mg 1.8, Cr 1. 

100.4F axillary temp overnight. Asking for food.


5/3: Afebrile overnight. ABG with pH 7.27/75/123. Hb 7.1. Na 153. PCA settings 

decreased


5/4: No acute events reported overnight, case discussed with nursing staff 

patient in no acute distress no complaints during my visit


5/5: Patient responding to verbal stimuli.  She is saturating well and not 

requiring ventilation support, no acute events reported overnight seems to be 

slowly improving


5/6: Patient requiring transfusion of packed red blood cells due to anemia, no 

evidence of acute bleeding, appreciate GI consultant recommendations


5/7: Patient required ventilatory support given that she desaturated, she is 

lying in bed in mild distress, case discussed with nursing staff, no evidence of

bleeding, h an h noted. 


5/8: Underwent IR procedure as follows


BRIEF OPERATIVE NOTE


Pre-Op Diagnosis


Pancreatitis with pseudocysts, suspected infection


Post-Op Diagnosis


same


Procedure Performed


CT abdominal Drains x 3


Surgeon


Hardy


Anesthesia Type:  Conscious Sedation


Findings


3 abdominal drains, 14F, with turbid pancreatic fluid and necrotic debris in 

each.


Complications


No immediate








5/9: Patient today somewhat restless and having bilious secretions from ET tube,

imaging studies ordered, discussed with consultant. Pretty poor prognosis, 

hopefully is not a fistula, poor surgical candidate. 





5/10: Imaging with no acute events, she seems more stable today compared to 

yesterday. Encouraged as much activity as possible patient at high risk for 

severe depression.





Vitals/I&O


Vitals/I&O:





                                   Vital Signs








  Date Time  Temp Pulse Resp B/P (MAP) Pulse Ox O2 Delivery O2 Flow Rate FiO2


 


5/17/20 12:00 98.6 102 20 181/92 (121) 98 Nasal Cannula 2.0 





 98.6       














                                    I & O   


 


 5/16/20 5/16/20 5/17/20





 15:00 23:00 07:00


 


Intake Total 260 ml 1579.28 ml 1475 ml


 


Output Total 2000 ml 1025 ml 900 ml


 


Balance -1740 ml 554.28 ml 575 ml











Physical Exam


Physical Exam:


GENERAL: Semi-sedated difficult to understand but trying to talk


HEENT:  oral cavity dry, NGT


NECK:  Trach with speaking valve


LUNGS: Expiratory wheezing but less than yesterday


HEART:  S1, S2, regular 


ABDOMEN: mod Distended, hypoactive BS, tender, + drains x 3


: [place] (4/14)


EXTREMITIES: Generalized edema, no cyanosis, SCDs bilaterally 


SKIN: Warm and dry.  No generalized rash.  


CNS: Very weak difficult to understand


PICC(4/30) clean


General:  moderate distress


Heart:  Regular rate, Normal S1, Normal S2, No murmurs, Gallops


Lungs:  Wheezing, Other (decrease bs)


Abdomen:  Soft, No tenderness, Other (drains in place)


Extremities:  No clubbing, No cyanosis, No edema, Normal pulses, No 

tenderness/swelling


Skin:  Other (warm, dry)





Labs


Labs:





Laboratory Tests








Test


 5/16/20


17:46 5/17/20


00:41 5/17/20


07:00 5/17/20


12:00


 


Glucose (Fingerstick)


 144 mg/dL


(70-99) 177 mg/dL


(70-99) 


 225 mg/dL


(70-99)


 


White Blood Count


 


 


 14.2 x10^3/uL


(4.0-11.0) 





 


Red Blood Count


 


 


 2.87 x10^6/uL


(3.50-5.40) 





 


Hemoglobin


 


 


 8.3 g/dL


(12.0-15.5) 





 


Hematocrit


 


 


 25.6 %


(36.0-47.0) 





 


Mean Corpuscular Volume   89 fL ()  


 


Mean Corpuscular Hemoglobin   29 pg (25-35)  


 


Mean Corpuscular Hemoglobin


Concent 


 


 32 g/dL


(31-37) 





 


Red Cell Distribution Width


 


 


 18.1 %


(11.5-14.5) 





 


Platelet Count


 


 


 497 x10^3/uL


(140-400) 





 


Neutrophils (%) (Auto)   68 % (31-73)  


 


Lymphocytes (%) (Auto)   25 % (24-48)  


 


Monocytes (%) (Auto)   6 % (0-9)  


 


Eosinophils (%) (Auto)   1 % (0-3)  


 


Basophils (%) (Auto)   0 % (0-3)  


 


Neutrophils # (Auto)


 


 


 9.6 x10^3/uL


(1.8-7.7) 





 


Lymphocytes # (Auto)


 


 


 3.5 x10^3/uL


(1.0-4.8) 





 


Monocytes # (Auto)


 


 


 0.9 x10^3/uL


(0.0-1.1) 





 


Eosinophils # (Auto)


 


 


 0.2 x10^3/uL


(0.0-0.7) 





 


Basophils # (Auto)


 


 


 0.0 x10^3/uL


(0.0-0.2) 





 


Sodium Level


 


 


 140 mmol/L


(136-145) 





 


Potassium Level


 


 


 4.5 mmol/L


(3.5-5.1) 





 


Chloride Level


 


 


 99 mmol/L


() 





 


Carbon Dioxide Level


 


 


 40 mmol/L


(21-32) 





 


Anion Gap   1 (6-14)  


 


Blood Urea Nitrogen


 


 


 26 mg/dL


(7-20) 





 


Creatinine


 


 


 0.5 mg/dL


(0.6-1.0) 





 


Estimated GFR


(Cockcroft-Gault) 


 


 131.1 


 





 


BUN/Creatinine Ratio   52 (6-20)  


 


Glucose Level


 


 


 127 mg/dL


(70-99) 





 


Calcium Level


 


 


 9.2 mg/dL


(8.5-10.1) 





 


Total Bilirubin


 


 


 0.4 mg/dL


(0.2-1.0) 





 


Aspartate Amino Transf


(AST/SGOT) 


 


 29 U/L (15-37) 


 





 


Alanine Aminotransferase


(ALT/SGPT) 


 


 27 U/L (14-59) 


 





 


Alkaline Phosphatase


 


 


 109 U/L


() 





 


Total Protein


 


 


 6.7 g/dL


(6.4-8.2) 





 


Albumin


 


 


 2.2 g/dL


(3.4-5.0) 





 


Albumin/Globulin Ratio   0.5 (1.0-1.7)  











Review of Systems


Review of Systems:


Complains of weakness complains of pain





Assessment and Plan


Assessmemt and Plan


Problems


Medical Problems:


(1) Acute pancreatitis


Status: Acute  





(2) Cholelithiasis


Status: Acute  





Acute hypoxic Respiratory failure requiring mechanical ventilation (on vent 

since 3/23)


Tracheostomy


bilateral pleural effusions/pulm edema 


Sepsis


Severe Acute gallstone pancreatitis (not a surgical candidate at this time) with

necrosis


Acute kidney failure now requiring dialysis


Salpingitis


Gallstones (Calculus of gallbladder with acute cholecystitis without 

obstruction)


HTN 


Leukocytosis 


Hypoxia


Uterine fibroid


Intractable pain


Intractable nausea


Covid 19 negative. 


Acute on chronic anemia 


EEG: No seizure activityFever  - better currently - intermittent could be from 

underlying pancreatitis blood cults 5/4 - neg so far


? Ileus with vomiting


Abd distention - U/S and CT reviewed s/p 0.4 L of opaque, debris-containing 

ascites was removed 5/6


Acute pancreatitis with persistent necrosis


- 4/27 status post ROBERT drain placement + C paropsilosis. s/p additional drains 

5/8


Anemia - S/p PRBCs


Cholelithiasis with thickening of the gallbladder wall.


Leucocytosis improving


JED, hyperkalemia, Metabolic acidosis off dialysis


Acute hypoxic resp failure ,bilateral pleural effusion and atelectasis


hypocalcemia 


Prediabetes


HTN


s/p trach


ESRD on HD


Hyperglycemia








Plan


ICU monitoring


Wound care


Humidified O2 via nasal cannula for now but we can use trach shield as backup


NG suctioning


Nebulizers


Continue IV antibiotics and micafungin


Sedation with Precedex


TPN protocol


Continue [place] to bedside drainage


Tracheostomy care


Hope to eventually move towards decannulation (we have a speaking valve for now)


Trend labs


Appreciate subspecialist input


She is critically ill


Prognosis is extremely guarded at best still


Total time 32-minute





Comment


Review of Relevant


I have reviewed the following items josy (where applicable) has been applied.


Medications:





Current Medications








 Medications


  (Trade)  Dose


 Ordered  Sig/Yee


 Route


 PRN Reason  Start Time


 Stop Time Status Last Admin


Dose Admin


 


 Potassium


 Chloride 90 meq/


 Magnesium Sulfate


 20 meq/


 Multivitamins 10


 ml/Chromium/


 Copper/Manganese/


 Seleni/Zn 1 ml/


 Insulin Human


 Regular 15 unit/


 Total Parenteral


 Nutrition/Amino


 Acids/Dextrose/


 Fat Emulsion


 Intravenous  1,890 ml @ 


 78.75 mls/


 hr  TPN  CONT


 IV


   5/16/20 22:00


 5/17/20 21:59  5/16/20 22:15





 


 Linezolid/Dextrose  300 ml @ 


 300 mls/hr  Q12HR


 IV


   5/17/20 09:00


    5/17/20 09:11





 


 Daptomycin 450 mg/


 Sodium Chloride  50 ml @ 


 100 mls/hr  Q24H


 IV


   5/17/20 09:00


    5/17/20 10:28














Hemodynamically unstable?:  No


Is patient in severe pain?:  No


Is NPO status required?:  Yes











HECTOR MASON III DO           May 17, 2020 13:04

## 2020-05-17 NOTE — PDOC
PROGRESS NOTES


Subjective


Subjective


pt up in chair sleeping;  I did not disturb her;  nurse states that one of the 

drains came out overnight





Objective


Objective





Vital Signs








  Date Time  Temp Pulse Resp B/P (MAP) Pulse Ox O2 Delivery O2 Flow Rate FiO2


 


5/17/20 15:57      Nasal Cannula 2.0 


 


5/17/20 15:31  145  124/65    


 


5/17/20 15:00   36  99   


 


5/17/20 12:00 98.6       





 98.6       














Intake and Output 


 


 5/17/20





 07:00


 


Intake Total 3314.28 ml


 


Output Total 3925 ml


 


Balance -610.72 ml


 


 


 


Intake Oral 0 ml


 


IV Total 3314.28 ml


 


Output Urine Total 3200 ml


 


Gastric Drainage Total 250 ml


 


Drainage Total 475 ml











Assessment


Assessment


Problems


Medical Problems:


(1) Acute pancreatitis


Status: Acute  





(2) Cholelithiasis


Status: Acute  











Plan


Plan of Care


Continue supportive care





Comment


Review of Relevant


I have reviewed the following items josy (where applicable) has been applied.


Labs





Laboratory Tests








Test


 5/15/20


17:59 5/16/20


01:15 5/16/20


05:45 5/16/20


05:54


 


Glucose (Fingerstick)


 130 mg/dL


(70-99) 153 mg/dL


(70-99) 


 119 mg/dL


(70-99)


 


White Blood Count


 


 


 11.3 x10^3/uL


(4.0-11.0) 





 


Red Blood Count


 


 


 2.65 x10^6/uL


(3.50-5.40) 





 


Hemoglobin


 


 


 7.5 g/dL


(12.0-15.5) 





 


Hematocrit


 


 


 23.6 %


(36.0-47.0) 





 


Mean Corpuscular Volume   89 fL ()  


 


Mean Corpuscular Hemoglobin   28 pg (25-35)  


 


Mean Corpuscular Hemoglobin


Concent 


 


 32 g/dL


(31-37) 





 


Red Cell Distribution Width


 


 


 18.1 %


(11.5-14.5) 





 


Platelet Count


 


 


 524 x10^3/uL


(140-400) 





 


Neutrophils (%) (Auto)   84 % (31-73)  


 


Lymphocytes (%) (Auto)   9 % (24-48)  


 


Monocytes (%) (Auto)   5 % (0-9)  


 


Eosinophils (%) (Auto)   1 % (0-3)  


 


Basophils (%) (Auto)   0 % (0-3)  


 


Neutrophils # (Auto)


 


 


 9.5 x10^3/uL


(1.8-7.7) 





 


Lymphocytes # (Auto)


 


 


 1.0 x10^3/uL


(1.0-4.8) 





 


Monocytes # (Auto)


 


 


 0.6 x10^3/uL


(0.0-1.1) 





 


Eosinophils # (Auto)


 


 


 0.1 x10^3/uL


(0.0-0.7) 





 


Basophils # (Auto)


 


 


 0.0 x10^3/uL


(0.0-0.2) 





 


Test


 5/16/20


11:11 5/16/20


17:46 5/17/20


00:41 5/17/20


07:00


 


Glucose (Fingerstick)


 162 mg/dL


(70-99) 144 mg/dL


(70-99) 177 mg/dL


(70-99) 





 


White Blood Count


 


 


 


 14.2 x10^3/uL


(4.0-11.0)


 


Red Blood Count


 


 


 


 2.87 x10^6/uL


(3.50-5.40)


 


Hemoglobin


 


 


 


 8.3 g/dL


(12.0-15.5)


 


Hematocrit


 


 


 


 25.6 %


(36.0-47.0)


 


Mean Corpuscular Volume    89 fL () 


 


Mean Corpuscular Hemoglobin    29 pg (25-35) 


 


Mean Corpuscular Hemoglobin


Concent 


 


 


 32 g/dL


(31-37)


 


Red Cell Distribution Width


 


 


 


 18.1 %


(11.5-14.5)


 


Platelet Count


 


 


 


 497 x10^3/uL


(140-400)


 


Neutrophils (%) (Auto)    68 % (31-73) 


 


Lymphocytes (%) (Auto)    25 % (24-48) 


 


Monocytes (%) (Auto)    6 % (0-9) 


 


Eosinophils (%) (Auto)    1 % (0-3) 


 


Basophils (%) (Auto)    0 % (0-3) 


 


Neutrophils # (Auto)


 


 


 


 9.6 x10^3/uL


(1.8-7.7)


 


Lymphocytes # (Auto)


 


 


 


 3.5 x10^3/uL


(1.0-4.8)


 


Monocytes # (Auto)


 


 


 


 0.9 x10^3/uL


(0.0-1.1)


 


Eosinophils # (Auto)


 


 


 


 0.2 x10^3/uL


(0.0-0.7)


 


Basophils # (Auto)


 


 


 


 0.0 x10^3/uL


(0.0-0.2)


 


Sodium Level


 


 


 


 140 mmol/L


(136-145)


 


Potassium Level


 


 


 


 4.5 mmol/L


(3.5-5.1)


 


Chloride Level


 


 


 


 99 mmol/L


()


 


Carbon Dioxide Level


 


 


 


 40 mmol/L


(21-32)


 


Anion Gap    1 (6-14) 


 


Blood Urea Nitrogen


 


 


 


 26 mg/dL


(7-20)


 


Creatinine


 


 


 


 0.5 mg/dL


(0.6-1.0)


 


Estimated GFR


(Cockcroft-Gault) 


 


 


 131.1 





 


BUN/Creatinine Ratio    52 (6-20) 


 


Glucose Level


 


 


 


 127 mg/dL


(70-99)


 


Calcium Level


 


 


 


 9.2 mg/dL


(8.5-10.1)


 


Total Bilirubin


 


 


 


 0.4 mg/dL


(0.2-1.0)


 


Aspartate Amino Transf


(AST/SGOT) 


 


 


 29 U/L (15-37) 





 


Alanine Aminotransferase


(ALT/SGPT) 


 


 


 27 U/L (14-59) 





 


Alkaline Phosphatase


 


 


 


 109 U/L


()


 


Total Protein


 


 


 


 6.7 g/dL


(6.4-8.2)


 


Albumin


 


 


 


 2.2 g/dL


(3.4-5.0)


 


Albumin/Globulin Ratio    0.5 (1.0-1.7) 


 


Test


 5/17/20


12:00 


 


 





 


Glucose (Fingerstick)


 225 mg/dL


(70-99) 


 


 











Laboratory Tests








Test


 5/16/20


17:46 5/17/20


00:41 5/17/20


07:00 5/17/20


12:00


 


Glucose (Fingerstick)


 144 mg/dL


(70-99) 177 mg/dL


(70-99) 


 225 mg/dL


(70-99)


 


White Blood Count


 


 


 14.2 x10^3/uL


(4.0-11.0) 





 


Red Blood Count


 


 


 2.87 x10^6/uL


(3.50-5.40) 





 


Hemoglobin


 


 


 8.3 g/dL


(12.0-15.5) 





 


Hematocrit


 


 


 25.6 %


(36.0-47.0) 





 


Mean Corpuscular Volume   89 fL ()  


 


Mean Corpuscular Hemoglobin   29 pg (25-35)  


 


Mean Corpuscular Hemoglobin


Concent 


 


 32 g/dL


(31-37) 





 


Red Cell Distribution Width


 


 


 18.1 %


(11.5-14.5) 





 


Platelet Count


 


 


 497 x10^3/uL


(140-400) 





 


Neutrophils (%) (Auto)   68 % (31-73)  


 


Lymphocytes (%) (Auto)   25 % (24-48)  


 


Monocytes (%) (Auto)   6 % (0-9)  


 


Eosinophils (%) (Auto)   1 % (0-3)  


 


Basophils (%) (Auto)   0 % (0-3)  


 


Neutrophils # (Auto)


 


 


 9.6 x10^3/uL


(1.8-7.7) 





 


Lymphocytes # (Auto)


 


 


 3.5 x10^3/uL


(1.0-4.8) 





 


Monocytes # (Auto)


 


 


 0.9 x10^3/uL


(0.0-1.1) 





 


Eosinophils # (Auto)


 


 


 0.2 x10^3/uL


(0.0-0.7) 





 


Basophils # (Auto)


 


 


 0.0 x10^3/uL


(0.0-0.2) 





 


Sodium Level


 


 


 140 mmol/L


(136-145) 





 


Potassium Level


 


 


 4.5 mmol/L


(3.5-5.1) 





 


Chloride Level


 


 


 99 mmol/L


() 





 


Carbon Dioxide Level


 


 


 40 mmol/L


(21-32) 





 


Anion Gap   1 (6-14)  


 


Blood Urea Nitrogen


 


 


 26 mg/dL


(7-20) 





 


Creatinine


 


 


 0.5 mg/dL


(0.6-1.0) 





 


Estimated GFR


(Cockcroft-Gault) 


 


 131.1 


 





 


BUN/Creatinine Ratio   52 (6-20)  


 


Glucose Level


 


 


 127 mg/dL


(70-99) 





 


Calcium Level


 


 


 9.2 mg/dL


(8.5-10.1) 





 


Total Bilirubin


 


 


 0.4 mg/dL


(0.2-1.0) 





 


Aspartate Amino Transf


(AST/SGOT) 


 


 29 U/L (15-37) 


 





 


Alanine Aminotransferase


(ALT/SGPT) 


 


 27 U/L (14-59) 


 





 


Alkaline Phosphatase


 


 


 109 U/L


() 





 


Total Protein


 


 


 6.7 g/dL


(6.4-8.2) 





 


Albumin


 


 


 2.2 g/dL


(3.4-5.0) 





 


Albumin/Globulin Ratio   0.5 (1.0-1.7)  








Microbiology


5/6/20 Fungal Culture - Final, Complete


         


5/6/20 Fungal Culture Result 1 - Final, Complete


         


5/4/20 Blood Culture - Final, Complete


         NO GROWTH AFTER 5 DAYS


4/30/20 Aerobic Culture - Final, Complete


          


4/30/20 Aerobic Culture Result 1 ([facility]) - Final, Complete


          


4/30/20 Gram Stain - Final, Complete


          


4/30/20 Gram Stain Result 1 ([facility]) - Final, Complete


          


4/30/20 Gram Stain Result 2 ([facility]) - Final, Complete


          


4/12/20 Urine Culture - Final, Complete


          


4/12/20 Urine Culture Result 1 ([facility]) - Final, Complete


Medications





Current Medications


Sodium Chloride 1,000 ml @  1,000 mls/hr Q1H IV  Last administered on 3/16/20at 

03:00;  Start 3/16/20 at 03:00;  Stop 3/16/20 at 03:59;  Status DC


Ondansetron HCl (Zofran) 4 mg 1X  ONCE IVP  Last administered on 3/16/20at 

03:27;  Start 3/16/20 at 03:00;  Stop 3/16/20 at 03:01;  Status DC


Morphine Sulfate (Morphine Sulfate) 4 mg 1X  ONCE IV ;  Start 3/16/20 at 03:00; 

Stop 3/16/20 at 03:01;  Status Cancel


Ketorolac Tromethamine (Toradol 30mg Vial) 30 mg 1X  ONCE IV  Last administered 

on 3/16/20at 02:54;  Start 3/16/20 at 03:00;  Stop 3/16/20 at 03:01;  Status DC


Fentanyl Citrate (Fentanyl 2ml Vial) 25 mcg 1X  ONCE IVP  Last administered on 

3/16/20at 03:23;  Start 3/16/20 at 03:30;  Stop 3/16/20 at 03:31;  Status DC


Fentanyl Citrate (Fentanyl 2ml Vial) 100 mcg STK-MED ONCE .ROUTE ;  Start 

3/16/20 at 03:18;  Stop 3/16/20 at 03:18;  Status DC


Iohexol (Omnipaque 350 Mg/ml) 90 ml 1X  ONCE IV  Last administered on 3/16/20at 

03:25;  Start 3/16/20 at 03:30;  Stop 3/16/20 at 03:31;  Status DC


Info (CONTRAST GIVEN -- Rx MONITORING) 1 each PRN DAILY  PRN MC SEE COMMENTS;  

Start 3/16/20 at 03:30;  Stop 3/18/20 at 03:29;  Status DC


Hydromorphone HCl (Dilaudid) 0.5 mg 1X  ONCE IV  Last administered on 3/16/20at 

03:55;  Start 3/16/20 at 04:30;  Stop 3/16/20 at 04:32;  Status DC


Ondansetron HCl (Zofran) 4 mg PRN Q8HRS  PRN IV NAUSEA/VOMITING 1ST CHOICE;  

Start 3/16/20 at 05:00;  Stop 3/16/20 at 09:27;  Status DC


Morphine Sulfate (Morphine Sulfate) 2 mg PRN Q2HR  PRN IV SEVERE PAIN 7-10 Last 

administered on 3/17/20at 12:26;  Start 3/16/20 at 05:00;  Stop 3/17/20 at 

14:15;  Status DC


Sodium Chloride 1,000 ml @  125 mls/hr Q8H IV  Last administered on 3/16/20at 

20:56;  Start 3/16/20 at 05:00;  Stop 3/17/20 at 04:59;  Status DC


Hydromorphone HCl (Dilaudid) 0.5 mg PRN Q3HRS  PRN IV SEVERE PAIN 7-10 Last 

administered on 3/17/20at 10:06;  Start 3/16/20 at 05:00;  Stop 3/17/20 at 

12:01;  Status DC


Piperacillin Sod/ Tazobactam Sod 4.5 gm/Sodium Chloride 100 ml @  200 mls/hr 1X 

ONCE IV  Last administered on 3/16/20at 05:44;  Start 3/16/20 at 06:00;  Stop 

3/16/20 at 06:29;  Status DC


Ondansetron HCl (Zofran) 4 mg PRN Q4HRS  PRN IV NAUSEA/VOMITING 1ST CHOICE Last 

administered on 5/10/20at 17:09;  Start 3/16/20 at 09:30


Insulin Human Lispro (HumaLOG) 0-9 UNITS Q6HRS SQ  Last administered on 

5/17/20at 12:02;  Start 3/16/20 at 09:30


Dextrose (Dextrose 50%-Water Syringe) 12.5 gm PRN Q15MIN  PRN IV SEE COMMENTS;  

Start 3/16/20 at 09:30


Pantoprazole Sodium (PROTONIX VIAL for IV PUSH) 40 mg DAILYAC IVP  Last 

administered on 5/17/20at 09:08;  Start 3/16/20 at 11:30


Prochlorperazine Edisylate (Compazine) 10 mg PRN Q6HRS  PRN IV NAUSEA/VOMITING, 

2nd CHOICE Last administered on 5/14/20at 06:11;  Start 3/16/20 at 17:45


Atenolol (Tenormin) 100 mg DAILY PO ;  Start 3/17/20 at 09:00;  Stop 3/16/20 at 

20:08;  Status DC


Metoprolol Tartrate (Lopressor Vial) 2.5 mg Q6HRS IVP  Last administered on 

3/17/20at 05:51;  Start 3/16/20 at 20:15;  Stop 3/17/20 at 10:02;  Status DC


Metoprolol Tartrate (Lopressor Vial) 5 mg Q6HRS IVP  Last administered on 

3/26/20at 00:12;  Start 3/17/20 at 10:15;  Stop 3/28/20 at 08:48;  Status DC


Hydromorphone HCl (Dilaudid) 1 mg PRN Q3HRS  PRN IV SEVERE PAIN 7-10 Last 

administered on 3/23/20at 05:13;  Start 3/17/20 at 12:00;  Stop 3/31/20 at 

00:25;  Status DC


Lidocaine HCl (Buffered Lidocaine 1%) 3 ml STK-MED ONCE .ROUTE ;  Start 3/17/20 

at 12:55;  Stop 3/17/20 at 12:56;  Status DC


Albumin Human 500 ml @  125 mls/hr 1X  ONCE IV  Last administered on 3/17/20at 

14:33;  Start 3/17/20 at 14:30;  Stop 3/17/20 at 18:32;  Status DC


Norepinephrine Bitartrate 8 mg/ Dextrose 258 ml @  17.299 mls/ hr CONT  PRN IV 

PER PROTOCOL Last administered on 4/14/20at 12:48;  Start 3/17/20 at 15:30;  

Stop 4/17/20 at 09:19;  Status DC


Sodium Chloride 1,000 ml @  125 mls/hr Q8H IV  Last administered on 3/17/20at 

21:04;  Start 3/17/20 at 16:00;  Stop 3/18/20 at 02:42;  Status DC


Albumin Human 500 ml @  125 mls/hr PRN BID  PRN IV After every 2L NSS & BP < 

90mm Last administered on 4/1/20at 14:21;  Start 3/17/20 at 16:00


Iohexol (Omnipaque 300 Mg/ml) 60 ml 1X  ONCE IV  Last administered on 3/17/20at 

17:20;  Start 3/17/20 at 17:00;  Stop 3/17/20 at 17:01;  Status DC


Info (CONTRAST GIVEN -- Rx MONITORING) 1 each PRN DAILY  PRN MC SEE COMMENTS;  

Start 3/17/20 at 17:00;  Stop 3/19/20 at 16:59;  Status DC


Meropenem 1 gm/ Sodium Chloride 100 ml @  200 mls/hr Q8HRS IV  Last administered

on 3/18/20at 05:45;  Start 3/17/20 at 20:00;  Stop 3/18/20 at 08:48;  Status DC


Furosemide (Lasix) 40 mg 1X  ONCE IVP  Last administered on 3/17/20at 22:12;  

Start 3/17/20 at 22:30;  Stop 3/17/20 at 22:31;  Status DC


Calcium Chloride 1000 mg/Sodium Chloride 110 ml @  220 mls/hr 1X  ONCE IV  Last 

administered on 3/17/20at 22:11;  Start 3/17/20 at 22:30;  Stop 3/17/20 at 

22:59;  Status DC


Albuterol Sulfate (Ventolin Neb Soln) 2.5 mg 1X  ONCE NEB  Last administered on 

3/18/20at 00:56;  Start 3/17/20 at 22:30;  Stop 3/17/20 at 22:31;  Status DC


Insulin Human Regular (HumuLIN R VIAL) 5 unit 1X  ONCE IV  Last administered on 

3/17/20at 22:14;  Start 3/17/20 at 22:30;  Stop 3/17/20 at 22:31;  Status DC


Magnesium Sulfate 50 ml @ 25 mls/hr 1X  ONCE IV  Last administered on 3/18/20at 

02:57;  Start 3/18/20 at 03:00;  Stop 3/18/20 at 04:59;  Status DC


Calcium Gluconate 1000 mg/Sodium Chloride 110 ml @  220 mls/hr 1X  ONCE IV  Last

administered on 3/18/20at 02:46;  Start 3/18/20 at 03:00;  Stop 3/18/20 at 

03:29;  Status DC


Sodium Chloride 1,000 ml @  200 mls/hr Q5H IV  Last administered on 3/18/20at 

02:46;  Start 3/18/20 at 03:00;  Stop 3/18/20 at 10:21;  Status DC


Calcium Gluconate 1000 mg/Sodium Chloride 110 ml @  220 mls/hr 1X  ONCE IV  Last

administered on 3/18/20at 03:21;  Start 3/18/20 at 03:30;  Stop 3/18/20 at 

03:59;  Status DC


Sodium Bicarbonate 50 meq/Sodium Chloride 1,050 ml @  75 mls/hr Q14H IV  Last 

administered on 3/22/20at 21:10;  Start 3/18/20 at 07:30;  Stop 3/23/20 at 

10:28;  Status DC


Calcium Gluconate 2000 mg/Sodium Chloride 120 ml @  220 mls/hr 1X  ONCE IV  Last

administered on 3/18/20at 09:05;  Start 3/18/20 at 07:30;  Stop 3/18/20 at 

08:02;  Status DC


Lidocaine HCl (Xylocaine-Mpf 1% 2ml Vial) 2 ml STK-MED ONCE .ROUTE ;  Start 

3/18/20 at 08:47;  Stop 3/18/20 at 08:47;  Status DC


Meropenem 500 mg/ Sodium Chloride 50 ml @  100 mls/hr Q12HR IV  Last 

administered on 3/23/20at 21:01;  Start 3/18/20 at 18:00;  Stop 3/24/20 at 

07:58;  Status DC


Lidocaine HCl (Buffered Lidocaine 1%) 3 ml STK-MED ONCE .ROUTE ;  Start 3/18/20 

at 09:46;  Stop 3/18/20 at 09:46;  Status DC


Lidocaine HCl (Buffered Lidocaine 1%) 6 ml 1X  ONCE INJ  Last administered on 

3/18/20at 10:26;  Start 3/18/20 at 10:15;  Stop 3/18/20 at 10:16;  Status DC


Info (Tpn Per Pharmacy) 1 each PRN DAILY  PRN MC SEE COMMENTS Last administered 

on 5/17/20at 10:36;  Start 3/18/20 at 12:00


Sodium Chloride 1,000 ml @  1,000 mls/hr Q1H PRN IV hypotension;  Start 3/18/20 

at 12:07;  Stop 3/18/20 at 18:06;  Status DC


Diphenhydramine HCl (Benadryl) 25 mg 1X PRN  PRN IV ITCHING;  Start 3/18/20 at 

12:15;  Stop 3/19/20 at 12:14;  Status DC


Diphenhydramine HCl (Benadryl) 25 mg 1X PRN  PRN IV ITCHING;  Start 3/18/20 at 

12:15;  Stop 3/19/20 at 12:14;  Status DC


Sodium Chloride 1,000 ml @  400 mls/hr Q2H30M PRN IV PATENCY;  Start 3/18/20 at 

12:07;  Stop 3/19/20 at 00:06;  Status DC


Info (PHARMACY MONITORING -- do not chart) 1 each PRN DAILY  PRN MC SEE 

COMMENTS;  Start 3/18/20 at 12:15;  Stop 3/20/20 at 08:13;  Status DC


Sodium Chloride 90 meq/Calcium Gluconate 10 meq/ Multivitamins 10 ml/Chromium/ 

Copper/Manganese/ Seleni/Zn 1 ml/ Total Parenteral Nutrition/Amino 

Acids/Dextrose/ Fat Emulsion Intravenous 55.005 ml  @ 2.292 mls/hr TPN  CONT IV 

;  Start 3/18/20 at 22:00;  Stop 3/18/20 at 12:33;  Status DC


Info (Tpn Per Pharmacy) 1 each PRN DAILY  PRN MC SEE COMMENTS;  Start 3/18/20 at

12:30;  Status UNV


Sodium Chloride 90 meq/Calcium Gluconate 10 meq/ Multivitamins 10 ml/Chromium/ 

Copper/Manganese/ Seleni/Zn 0.5 ml/ Total Parenteral Nutrition/Amino 

Acids/Dextrose/ Fat Emulsion Intravenous 1,512 ml @  63 mls/hr TPN  CONT IV  

Last administered on 3/18/20at 22:06;  Start 3/18/20 at 22:00;  Stop 3/19/20 at 

21:59;  Status DC


Calcium Carbonate/ Glycine (Tums) 500 mg PRN AFTMEALHC  PRN PO INDIGESTION;  

Start 3/18/20 at 17:45;  Stop 5/13/20 at 10:25;  Status DC


Calcium Gluconate (Calcium Gluconate) 2,000 mg 1X  ONCE IVP  Last administered 

on 3/19/20at 02:19;  Start 3/19/20 at 02:15;  Stop 3/19/20 at 02:16;  Status DC


Calcium Chloride 3000 mg/Sodium Chloride 1,030 ml @  50 mls/hr [id number] IV  Last 

administered on 3/21/20at 02:17;  Start 3/19/20 at 08:00;  Stop 3/21/20 at 15:2

3;  Status DC


Lorazepam (Ativan Inj) 1 mg PRN Q4HRS  PRN IVP ANXIETY / AGITATION, 2nd choic 

Last administered on 4/17/20at 03:51;  Start 3/19/20 at 09:00;  Stop 4/17/20 at 

09:19;  Status DC


Sodium Chloride 1,000 ml @  1,000 mls/hr Q1H PRN IV hypotension;  Start 3/19/20 

at 08:56;  Stop 3/19/20 at 14:55;  Status DC


Albumin Human 200 ml @  200 mls/hr 1X PRN  PRN IV Hypotension;  Start 3/19/20 at

09:00;  Stop 3/19/20 at 14:59;  Status DC


Diphenhydramine HCl (Benadryl) 25 mg 1X PRN  PRN IV ITCHING;  Start 3/19/20 at 

09:00;  Stop 3/20/20 at 08:59;  Status DC


Diphenhydramine HCl (Benadryl) 25 mg 1X PRN  PRN IV ITCHING;  Start 3/19/20 at 

09:00;  Stop 3/20/20 at 08:59;  Status DC


Sodium Chloride 1,000 ml @  400 mls/hr Q2H30M PRN IV PATENCY;  Start 3/19/20 at 

08:56;  Stop 3/19/20 at 20:55;  Status DC


Info (PHARMACY MONITORING -- do not chart) 1 each PRN DAILY  PRN MC SEE 

COMMENTS;  Start 3/19/20 at 09:00;  Status UNV


Info (PHARMACY MONITORING -- do not chart) 1 each PRN DAILY  PRN MC SEE 

COMMENTS;  Start 3/19/20 at 09:00;  Stop 3/20/20 at 08:13;  Status DC


Digoxin (Lanoxin) 500 mcg 1X  ONCE IV  Last administered on 3/19/20at 10:04;  

Start 3/19/20 at 10:00;  Stop 3/19/20 at 10:01;  Status DC


Digoxin (Lanoxin) 125 mcg 1X  ONCE IV  Last administered on 3/19/20at 17:10;  St

art 3/19/20 at 18:00;  Stop 3/19/20 at 18:01;  Status DC


Magnesium Sulfate 100 ml @  25 mls/hr 1X  ONCE IV  Last administered on 

3/19/20at 12:48;  Start 3/19/20 at 13:00;  Stop 3/19/20 at 16:59;  Status DC


Sodium Chloride 90 meq/Magnesium Sulfate 10 meq/ Calcium Gluconate 20 meq/ 

Multivitamins 10 ml/Chromium/ Copper/Manganese/ Seleni/Zn 0.5 ml/ Total 

Parenteral Nutrition/Amino Acids/Dextrose/ Fat Emulsion Intravenous 1,512 ml @  

63 mls/hr TPN  CONT IV  Last administered on 3/19/20at 22:25;  Start 3/19/20 at 

22:00;  Stop 3/20/20 at 21:59;  Status DC


Sodium Chloride 1,000 ml @  1,000 mls/hr Q1H PRN IV hypotension;  Start 3/20/20 

at 08:05;  Stop 3/20/20 at 14:04;  Status DC


Albumin Human 200 ml @  200 mls/hr 1X  ONCE IV  Last administered on 3/20/20at 

08:57;  Start 3/20/20 at 08:15;  Stop 3/20/20 at 09:14;  Status DC


Diphenhydramine HCl (Benadryl) 25 mg 1X PRN  PRN IV ITCHING;  Start 3/20/20 at 

08:15;  Stop 3/21/20 at 08:14;  Status DC


Diphenhydramine HCl (Benadryl) 25 mg 1X PRN  PRN IV ITCHING;  Start 3/20/20 at 

08:15;  Stop 3/21/20 at 08:14;  Status DC


Sodium Chloride 1,000 ml @  400 mls/hr Q2H30M PRN IV PATENCY;  Start 3/20/20 at 

08:05;  Stop 3/20/20 at 20:04;  Status DC


Info (PHARMACY MONITORING -- do not chart) 1 each PRN DAILY  PRN MC SEE 

COMMENTS;  Start 3/20/20 at 08:15;  Stop 3/24/20 at 07:57;  Status DC


Sodium Chloride 90 meq/Potassium Chloride 15 meq/ Potassium Phosphate 10 mmol/ 

Magnesium Sulfate 10 meq/Calcium Gluconate 20 meq/ Multivitamins 10 ml/Chromium/

Copper/Manganese/ Seleni/Zn 0.5 ml/ Total Parenteral Nutrition/Amino 

Acids/Dextrose/ Fat Emulsion Intravenous 1,512 ml @  63 mls/hr TPN  CONT IV  

Last administered on 3/20/20at 21:01;  Start 3/20/20 at 22:00;  Stop 3/21/20 at 

21:59;  Status DC


Potassium Chloride/Water 100 ml @  100 mls/hr 1X  ONCE IV  Last administered on 

3/20/20at 14:09;  Start 3/20/20 at 14:00;  Stop 3/20/20 at 14:59;  Status DC


Benzocaine (Hurricaine One) 1 spray 1X  ONCE MM  Last administered on 3/20/20at 

16:38;  Start 3/20/20 at 14:30;  Stop 3/20/20 at 14:31;  Status DC


Lidocaine HCl (Glydo (Lidocaine) Jelly) 1 thomas 1X  ONCE MM  Last administered on 

3/20/20at 16:38;  Start 3/20/20 at 14:30;  Stop 3/20/20 at 14:31;  Status DC


Linezolid/Dextrose 300 ml @  300 mls/hr Q12HR IV  Last administered on 3/26/20at

21:04;  Start 3/20/20 at 20:00;  Stop 3/27/20 at 07:50;  Status DC


Acetaminophen (Tylenol) 650 mg PRN Q6HRS  PRN PO MILD PAIN / TEMP;  Start 

3/21/20 at 03:30;  Stop 3/21/20 at 03:36;  Status DC


Acetaminophen (Tylenol) 650 mg PRN Q6HRS  PRN PEG MILD PAIN / TEMP Last 

administered on 4/16/20at 19:56;  Start 3/21/20 at 03:36;  Stop 5/13/20 at 

10:25;  Status DC


Sodium Chloride 1,000 ml @  1,000 mls/hr Q1H PRN IV hypotension;  Start 3/21/20 

at 07:50;  Stop 3/21/20 at 13:49;  Status DC


Albumin Human 200 ml @  200 mls/hr 1X PRN  PRN IV Hypotension;  Start 3/21/20 at

08:00;  Stop 3/21/20 at 13:59;  Status DC


Sodium Chloride (Normal Saline Flush) 10 ml 1X PRN  PRN IV AP catheter pack;  

Start 3/21/20 at 08:00;  Stop 3/22/20 at 07:59;  Status DC


Sodium Chloride (Normal Saline Flush) 10 ml 1X PRN  PRN IV  catheter pack;  

Start 3/21/20 at 08:00;  Stop 3/22/20 at 07:59;  Status DC


Sodium Chloride 1,000 ml @  400 mls/hr Q2H30M PRN IV PATENCY;  Start 3/21/20 at 

07:50;  Stop 3/21/20 at 19:49;  Status DC


Info (PHARMACY MONITORING -- do not chart) 1 each PRN DAILY  PRN MC SEE 

COMMENTS;  Start 3/21/20 at 08:00;  Status UNV


Info (PHARMACY MONITORING -- do not chart) 1 each PRN DAILY  PRN MC SEE 

COMMENTS;  Start 3/21/20 at 08:00;  Stop 3/23/20 at 08:25;  Status DC


Sodium Chloride 90 meq/Potassium Chloride 15 meq/ Potassium Phosphate 10 mmol/ 

Magnesium Sulfate 10 meq/Calcium Gluconate 20 meq/ Multivitamins 10 ml/Chromium/

Copper/Manganese/ Seleni/Zn 0.5 ml/ Total Parenteral Nutrition/Amino 

Acids/Dextrose/ Fat Emulsion Intravenous 1,512 ml @  63 mls/hr TPN  CONT IV  

Last administered on 3/21/20at 20:57;  Start 3/21/20 at 22:00;  Stop 3/22/20 at 

21:59;  Status DC


Sodium Chloride 90 meq/Potassium Chloride 15 meq/ Potassium Phosphate 15 mmol/ 

Magnesium Sulfate 10 meq/Calcium Gluconate 20 meq/ Multivitamins 10 ml/Chromium/

Copper/Manganese/ Seleni/Zn 0.5 ml/ Total Parenteral Nutrition/Amino 

Acids/Dextrose/ Fat Emulsion Intravenous 1,512 ml @  63 mls/hr TPN  CONT IV ;  

Start 3/22/20 at 22:00;  Stop 3/22/20 at 14:16;  Status DC


Sodium Chloride 90 meq/Potassium Chloride 15 meq/ Potassium Phosphate 15 mmol/ 

Magnesium Sulfate 10 meq/Calcium Gluconate 20 meq/ Multivitamins 10 ml/Chromium/

Copper/Manganese/ Seleni/Zn 0.5 ml/ Total Parenteral Nutrition/Amino 

Acids/Dextrose/ Fat Emulsion Intravenous 1,200 ml @  50 mls/hr TPN  CONT IV ;  

Start 3/22/20 at 22:00;  Stop 3/22/20 at 14:17;  Status DC


Sodium Chloride 90 meq/Potassium Chloride 15 meq/ Potassium Phosphate 10 mmol/ 

Magnesium Sulfate 10 meq/Calcium Gluconate 20 meq/ Multivitamins 10 ml/Chromium/

Copper/Manganese/ Seleni/Zn 0.5 ml/ Total Parenteral Nutrition/Amino 

Acids/Dextrose/ Fat Emulsion Intravenous 1,200 ml @  50 mls/hr TPN  CONT IV  

Last administered on 3/22/20at 23:29;  Start 3/22/20 at 22:00;  Stop 3/23/20 at 

21:59;  Status DC


Sodium Chloride 1,000 ml @  1,000 mls/hr Q1H PRN IV hypotension;  Start 3/23/20 

at 07:28;  Stop 3/23/20 at 13:27;  Status DC


Albumin Human 200 ml @  200 mls/hr 1X  ONCE IV  Last administered on 3/23/20at 

08:51;  Start 3/23/20 at 07:30;  Stop 3/23/20 at 08:29;  Status DC


Diphenhydramine HCl (Benadryl) 25 mg 1X PRN  PRN IV ITCHING;  Start 3/23/20 at 

07:30;  Stop 3/24/20 at 07:29;  Status DC


Diphenhydramine HCl (Benadryl) 25 mg 1X PRN  PRN IV ITCHING;  Start 3/23/20 at 

07:30;  Stop 3/24/20 at 07:29;  Status DC


Sodium Chloride 1,000 ml @  400 mls/hr Q2H30M PRN IV PATENCY;  Start 3/23/20 at 

07:28;  Stop 3/23/20 at 19:27;  Status DC


Info (PHARMACY MONITORING -- do not chart) 1 each PRN DAILY  PRN MC SEE 

COMMENTS;  Start 3/23/20 at 07:30;  Stop 4/3/20 at 13:01;  Status DC


Metronidazole 100 ml @  100 mls/hr Q6HRS IV  Last administered on 4/8/20at 

06:26;  Start 3/23/20 at 08:30;  Stop 4/8/20 at 09:58;  Status DC


Micafungin Sodium 100 mg/Dextrose 100 ml @  100 mls/hr Q24H IV  Last 

administered on 4/30/20at 08:18;  Start 3/23/20 at 09:00;  Stop 4/30/20 at 

20:58;  Status DC


Propofol 0 ml @ As Directed STK-MED ONCE IV ;  Start 3/23/20 at 07:53;  Stop 

3/23/20 at 07:53;  Status DC


Etomidate (Amidate) 20 mg STK-MED ONCE IV ;  Start 3/23/20 at 07:53;  Stop 

3/23/20 at 07:54;  Status DC


Midazolam HCl (Versed) 5 mg STK-MED ONCE .ROUTE ;  Start 3/23/20 at 07:57;  Stop

3/23/20 at 07:57;  Status DC


Fentanyl Citrate 30 ml @ 0 mls/hr CONT  PRN IV SEE PROTOCOL Last administered on

4/17/20at 06:12;  Start 3/23/20 at 08:15;  Stop 4/17/20 at 09:19;  Status DC


Artificial Tears (Artificial Tears) 1 drop PRN Q1HR  PRN OU DRY EYE, 1st choice;

 Start 3/23/20 at 08:15;  Stop 4/29/20 at 05:31;  Status DC


Midazolam HCl 50 mg/Sodium Chloride 50 ml @ 0 mls/hr CONT  PRN IV SEE PROTOCOL 

Last administered on 3/26/20at 22:39;  Start 3/23/20 at 08:15;  Stop 3/28/20 at 

15:59;  Status DC


Etomidate (Amidate) 8 mg 1X  ONCE IV  Last administered on 3/23/20at 08:33;  

Start 3/23/20 at 08:30;  Stop 3/23/20 at 08:31;  Status DC


Succinylcholine Chloride (Anectine) 120 mg 1X  ONCE IV  Last administered on 

3/23/20at 08:34;  Start 3/23/20 at 08:30;  Stop 3/23/20 at 08:31;  Status DC


Midazolam HCl (Versed) 5 mg 1X  ONCE IV ;  Start 3/23/20 at 08:30;  Stop 3/23/20

at 08:31;  Status DC


Potassium Chloride 15 meq/ Bicarbonate Dialysis Soln w/ out KCl 5,007.5 ml  @ 

1,000 mls/ hr Q5H1M IV  Last administered on 3/24/20at 11:11;  Start 3/23/20 at 

12:00;  Stop 3/24/20 at 11:15;  Status DC


Potassium Chloride 15 meq/ Bicarbonate Dialysis Soln w/ out KCl 5,007.5 ml  @ 

1,000 mls/ hr Q5H1M IV  Last administered on 3/24/20at 11:12;  Start 3/23/20 at 

12:00;  Stop 3/24/20 at 11:17;  Status DC


Potassium Chloride 15 meq/ Bicarbonate Dialysis Soln w/ out KCl 5,007.5 ml  @ 

1,000 mls/ hr Q5H1M IV  Last administered on 3/24/20at 11:11;  Start 3/23/20 at 

12:00;  Stop 3/24/20 at 11:19;  Status DC


Sodium Chloride 90 meq/Potassium Chloride 15 meq/ Potassium Phosphate 10 mmol/ 

Magnesium Sulfate 10 meq/Calcium Gluconate 20 meq/ Multivitamins 10 ml/Chromium/

Copper/Manganese/ Seleni/Zn 0.5 ml/ Total Parenteral Nutrition/Amino 

Acids/Dextrose/ Fat Emulsion Intravenous 1,400 ml @  58.333 mls/ hr TPN  CONT IV

 Last administered on 3/23/20at 21:42;  Start 3/23/20 at 22:00;  Stop 3/24/20 at

21:59;  Status DC


Heparin Sodium (Porcine) (Heparin Sodium) 5,000 unit Q8HRS SQ  Last administered

on 3/28/20at 05:55;  Start 3/23/20 at 15:00;  Stop 3/28/20 at 13:28;  Status DC


Meropenem 500 mg/ Sodium Chloride 50 ml @  100 mls/hr Q6HRS IV  Last 

administered on 3/25/20at 06:00;  Start 3/24/20 at 09:00;  Stop 3/25/20 at 

07:29;  Status DC


Potassium Phosphate 20 mmol/ Sodium Chloride 106.6667 ml @  51.667 m... 1X  ONCE

IV  Last administered on 3/24/20at 11:22;  Start 3/24/20 at 10:15;  Stop 3/24/20

at 12:18;  Status DC


Acetaminophen (Tylenol Supp) 650 mg PRN Q6HRS  PRN FL MILD PAIN / TEMP > 100.3'F

Last administered on 5/5/20at 09:12;  Start 3/24/20 at 10:30


Potassium Chloride/Water 100 ml @  100 mls/hr Q1H IV  Last administered on 3/24/

20at 12:12;  Start 3/24/20 at 11:00;  Stop 3/24/20 at 12:59;  Status DC


Potassium Chloride 20 meq/ Bicarbonate Dialysis Soln w/ out KCl 5,010 ml @  

1,000 mls/hr Q5H1M IV  Last administered on 3/25/20at 08:48;  Start 3/24/20 at 

12:00;  Stop 3/25/20 at 13:03;  Status DC


Potassium Chloride 20 meq/ Bicarbonate Dialysis Soln w/ out KCl 5,010 ml @  

1,000 mls/hr Q5H1M IV  Last administered on 3/29/20at 14:52;  Start 3/24/20 at 

11:30;  Stop 3/29/20 at 19:59;  Status DC


Potassium Chloride 20 meq/ Bicarbonate Dialysis Soln w/ out KCl 5,010 ml @  

1,000 mls/hr Q5H1M IV  Last administered on 3/29/20at 14:53;  Start 3/24/20 at 

11:30;  Stop 3/29/20 at 19:59;  Status DC


Sodium Chloride 90 meq/Potassium Chloride 15 meq/ Potassium Phosphate 15 mmol/ 

Magnesium Sulfate 10 meq/Calcium Gluconate 15 meq/ Multivitamins 10 ml/Chromium/

Copper/Manganese/ Seleni/Zn 0.5 ml/ Total Parenteral Nutrition/Amino Acid

s/Dextrose/ Fat Emulsion Intravenous 1,400 ml @  58.333 mls/ hr TPN  CONT IV  

Last administered on 3/24/20at 22:17;  Start 3/24/20 at 22:00;  Stop 3/25/20 at 

21:59;  Status DC


Cefepime HCl (Maxipime) 2 gm Q12HR IVP  Last administered on 4/7/20at 20:56;  St

art 3/25/20 at 09:00;  Stop 4/8/20 at 09:58;  Status DC


Daptomycin 500 mg/ Sodium Chloride 50 ml @  100 mls/hr Q48H IV  Last adm

inistered on 4/10/20at 09:57;  Start 3/25/20 at 08:30;  Stop 4/10/20 at 10:07;  

Status DC


Lidocaine HCl (Buffered Lidocaine 1%) 3 ml 1X  ONCE INJ  Last administered on 

3/25/20at 10:27;  Start 3/25/20 at 10:30;  Stop 3/25/20 at 10:31;  Status DC


Potassium Phosphate 20 mmol/ Sodium Chloride 106.6667 ml @  51.667 m... 1X  ONCE

IV  Last administered on 3/25/20at 12:51;  Start 3/25/20 at 13:00;  Stop 3/25/20

at 15:03;  Status DC


Sodium Chloride 90 meq/Potassium Chloride 15 meq/ Potassium Phosphate 18 mmol/ 

Magnesium Sulfate 8 meq/Calcium Gluconate 15 meq/ Multivitamins 10 ml/Chromium/ 

Copper/Manganese/ Seleni/Zn 0.5 ml/ Total Parenteral Nutrition/Amino 

Acids/Dextrose/ Fat Emulsion Intravenous 1,400 ml @  58.333 mls/ hr TPN  CONT IV

 Last administered on 3/25/20at 22:16;  Start 3/25/20 at 22:00;  Stop 3/26/20 at

21:59;  Status DC


Potassium Chloride 20 meq/ Bicarbonate Dialysis Soln w/ out KCl 5,010 ml @  

1,000 mls/hr Q5H1M IV  Last administered on 3/29/20at 14:54;  Start 3/25/20 at 

16:00;  Stop 3/29/20 at 19:59;  Status DC


Multi-Ingred Cream/Lotion/Oil/ Oint (Artificial Tears Eye Ointment) 1 thomas PRN 

Q1HR  PRN OU DRY EYE, 2nd choice Last administered on 4/13/20at 08:19;  Start 

3/25/20 at 17:30


Sodium Chloride 90 meq/Potassium Chloride 15 meq/ Potassium Phosphate 18 mmol/ 

Magnesium Sulfate 8 meq/Calcium Gluconate 15 meq/ Multivitamins 10 ml/Chromium/ 

Copper/Manganese/ Seleni/Zn 0.5 ml/ Total Parenteral Nutrition/Amino 

Acids/Dextrose/ Fat Emulsion Intravenous 1,400 ml @  58.333 mls/ hr TPN  CONT IV

 Last administered on 3/26/20at 22:00;  Start 3/26/20 at 22:00;  Stop 3/27/20 at

21:59;  Status DC


Albumin Human 500 ml @  125 mls/hr 1X  ONCE IV ;  Start 3/26/20 at 14:15;  Stop 

3/26/20 at 18:14;  Status DC


Sodium Chloride 90 meq/Potassium Chloride 15 meq/ Potassium Phosphate 18 mmol/ 

Magnesium Sulfate 8 meq/Calcium Gluconate 15 meq/ Multivitamins 10 ml/Chromium/ 

Copper/Manganese/ Seleni/Zn 0.5 ml/ Insulin Human Regular 10 unit/ Total 

Parenteral Nutrition/Amino Acids/Dextrose/ Fat Emulsion Intravenous 1,400 ml @  

58.333 mls/ hr TPN  CONT IV  Last administered on 3/27/20at 21:43;  Start 

3/27/20 at 22:00;  Stop 3/28/20 at 21:59;  Status DC


Lidocaine HCl (Buffered Lidocaine 1%) 3 ml STK-MED ONCE .ROUTE ;  Start 3/25/20 

at 10:00;  Stop 3/27/20 at 13:57;  Status DC


Midazolam HCl 100 mg/Sodium Chloride 100 ml @ 7 mls/hr CONT  PRN IV SEE PROTOCOL

Last administered on 4/8/20at 15:35;  Start 3/28/20 at 16:00


Sodium Chloride 90 meq/Potassium Chloride 15 meq/ Potassium Phosphate 18 mmol/ 

Magnesium Sulfate 8 meq/Calcium Gluconate 15 meq/ Multivitamins 10 ml/Chromium/ 

Copper/Manganese/ Seleni/Zn 0.5 ml/ Insulin Human Regular 15 unit/ Total 

Parenteral Nutrition/Amino Acids/Dextrose/ Fat Emulsion Intravenous 1,400 ml @  

58.333 mls/ hr TPN  CONT IV  Last administered on 3/28/20at 20:34;  Start 3

/28/20 at 22:00;  Stop 3/29/20 at 21:59;  Status DC


Info ([facility] Electrolyte Protocol) 1 ea CONT PRN  PRN MC PER PROTOCOL;  Start 3

/29/20 at 13:15


Sodium Chloride 90 meq/Potassium Chloride 15 meq/ Potassium Phosphate 18 mmol/ 

Magnesium Sulfate 8 meq/Calcium Gluconate 15 meq/ Multivitamins 10 ml/Chromium/ 

Copper/Manganese/ Seleni/Zn 0.5 ml/ Insulin Human Regular 15 unit/ Total 

Parenteral Nutrition/Amino Acids/Dextrose/ Fat Emulsion Intravenous 1,400 ml @  

58.333 mls/ hr TPN  CONT IV  Last administered on 3/29/20at 22:05;  Start 

3/29/20 at 22:00;  Stop 3/30/20 at 21:59;  Status DC


Potassium Chloride 15 meq/ Bicarbonate Dialysis Soln w/ out KCl 5,007.5 ml  @ 

1,000 mls/ hr Q5H1M IV  Last administered on 4/1/20at 18:14;  Start 3/29/20 at 

20:00;  Stop 4/2/20 at 13:08;  Status DC


Potassium Chloride 15 meq/ Bicarbonate Dialysis Soln w/ out KCl 5,007.5 ml  @ 

1,000 mls/ hr Q5H1M IV  Last administered on 4/1/20at 18:14;  Start 3/29/20 at 

20:00;  Stop 4/2/20 at 13:08;  Status DC


Potassium Chloride 15 meq/ Bicarbonate Dialysis Soln w/ out KCl 5,007.5 ml  @ 

1,000 mls/ hr Q5H1M IV  Last administered on 4/1/20at 18:14;  Start 3/29/20 at 

20:00;  Stop 4/2/20 at 13:08;  Status DC


Iohexol (Omnipaque 240 Mg/ml) 30 ml 1X  ONCE PO  Last administered on 3/30/20at 

11:30;  Start 3/30/20 at 11:30;  Stop 3/30/20 at 11:33;  Status DC


Info (CONTRAST GIVEN -- Rx MONITORING) 1 each PRN DAILY  PRN MC SEE COMMENTS;  

Start 3/30/20 at 11:45;  Stop 4/1/20 at 11:44;  Status DC


Sodium Chloride 90 meq/Potassium Chloride 15 meq/ Potassium Phosphate 18 mmol/ 

Magnesium Sulfate 8 meq/Calcium Gluconate 15 meq/ Multivitamins 10 ml/Chromium/ 

Copper/Manganese/ Seleni/Zn 0.5 ml/ Insulin Human Regular 15 unit/ Total 

Parenteral Nutrition/Amino Acids/Dextrose/ Fat Emulsion Intravenous 1,400 ml @  

58.333 mls/ hr TPN  CONT IV  Last administered on 3/30/20at 21:47;  Start 

3/30/20 at 22:00;  Stop 3/31/20 at 21:59;  Status DC


Sodium Chloride 90 meq/Potassium Chloride 15 meq/ Potassium Phosphate 18 mmol/ 

Magnesium Sulfate 8 meq/Calcium Gluconate 15 meq/ Multivitamins 10 ml/Chromium/ 

Copper/Manganese/ Seleni/Zn 0.5 ml/ Insulin Human Regular 20 unit/ Total 

Parenteral Nutrition/Amino Acids/Dextrose/ Fat Emulsion Intravenous 1,400 ml @  

58.333 mls/ hr TPN  CONT IV  Last administered on 3/31/20at 21:36;  Start 

3/31/20 at 22:00;  Stop 4/1/20 at 21:59;  Status DC


Alteplase, Recombinant (Cathflo For Central Catheter Clearance) 1 mg 1X  ONCE 

INT CAT  Last administered on 3/31/20at 20:03;  Start 3/31/20 at 19:30;  Stop 

3/31/20 at 19:46;  Status DC


Alteplase, Recombinant (Cathflo For Central Catheter Clearance) 1 mg 1X  ONCE 

INT CAT  Last administered on 3/31/20at 22:05;  Start 3/31/20 at 22:00;  Stop 

3/31/20 at 22:01;  Status DC


Sodium Chloride 90 meq/Potassium Chloride 15 meq/ Potassium Phosphate 18 mmol/ 

Magnesium Sulfate 8 meq/Calcium Gluconate 15 meq/ Multivitamins 10 ml/Chromium/ 

Copper/Manganese/ Seleni/Zn 0.5 ml/ Insulin Human Regular 20 unit/ Total Pare

nteral Nutrition/Amino Acids/Dextrose/ Fat Emulsion Intravenous 1,400 ml @  

58.333 mls/ hr TPN  CONT IV  Last administered on 4/1/20at 21:30;  Start 4/1/20 

at 22:00;  Stop 4/2/20 at 21:59;  Status DC


Dexmedetomidine HCl 400 mcg/ Sodium Chloride 100 ml @ 0 mls/hr CONT  PRN IV 

ANXIETY / AGITATION Last administered on 5/17/20at 14:05;  Start 4/2/20 at 08:15


Sodium Chloride 500 ml @  500 mls/hr 1X PRN  PRN IV ELEVATED BP, SEE COMMENTS;  

Start 4/2/20 at 08:15


Atropine Sulfate (ATROPINE 0.5mg SYRINGE) 0.5 mg PRN Q5MIN  PRN IV SEE COMMENTS;

 Start 4/2/20 at 08:15


Furosemide (Lasix) 20 mg 1X  ONCE IVP  Last administered on 4/2/20at 08:19;  

Start 4/2/20 at 08:15;  Stop 4/2/20 at 08:16;  Status DC


Lidocaine HCl (Buffered Lidocaine 1%) 3 ml STK-MED ONCE .ROUTE ;  Start 4/2/20 

at 08:39;  Stop 4/2/20 at 08:39;  Status DC


Lidocaine HCl (Buffered Lidocaine 1%) 6 ml 1X  ONCE INJ  Last administered on 

4/2/20at 09:05;  Start 4/2/20 at 09:00;  Stop 4/2/20 at 09:06;  Status DC


Sodium Chloride 90 meq/Potassium Chloride 15 meq/ Potassium Phosphate 18 mmol/ 

Magnesium Sulfate 8 meq/Calcium Gluconate 15 meq/ Multivitamins 10 ml/Chromium/ 

Copper/Manganese/ Seleni/Zn 0.5 ml/ Insulin Human Regular 20 unit/ Total 

Parenteral Nutrition/Amino Acids/Dextrose/ Fat Emulsion Intravenous 1,400 ml @  

58.333 mls/ hr TPN  CONT IV  Last administered on 4/2/20at 22:45;  Start 4/2/20 

at 22:00;  Stop 4/3/20 at 21:59;  Status DC


Sodium Chloride 1,000 ml @  1,000 mls/hr Q1H PRN IV hypotension;  Start 4/3/20 

at 07:30;  Stop 4/3/20 at 13:29;  Status DC


Albumin Human 200 ml @  200 mls/hr 1X PRN  PRN IV Hypotension Last administered 

on 4/3/20at 09:36;  Start 4/3/20 at 07:30;  Stop 4/3/20 at 13:29;  Status DC


Sodium Chloride (Normal Saline Flush) 10 ml 1X PRN  PRN IV AP catheter pack;  

Start 4/3/20 at 07:30;  Stop 4/3/20 at 21:29;  Status DC


Sodium Chloride (Normal Saline Flush) 10 ml 1X PRN  PRN IV  catheter pack;  

Start 4/3/20 at 07:30;  Stop 4/4/20 at 07:29;  Status DC


Sodium Chloride 1,000 ml @  400 mls/hr Q2H30M PRN IV PATENCY;  Start 4/3/20 at 

07:30;  Stop 4/3/20 at 19:29;  Status DC


Info (PHARMACY MONITORING -- do not chart) 1 each PRN DAILY  PRN MC SEE 

COMMENTS;  Start 4/3/20 at 07:30;  Stop 4/3/20 at 13:02;  Status DC


Info (PHARMACY MONITORING -- do not chart) 1 each PRN DAILY  PRN MC SEE 

COMMENTS;  Start 4/3/20 at 07:30;  Stop 4/5/20 at 12:45;  Status DC


Sodium Chloride 90 meq/Potassium Chloride 15 meq/ Potassium Phosphate 10 mmol/ 

Magnesium Sulfate 8 meq/Calcium Gluconate 15 meq/ Multivitamins 10 ml/Chromium/ 

Copper/Manganese/ Seleni/Zn 0.5 ml/ Insulin Human Regular 25 unit/ Total 

Parenteral Nutrition/Amino Acids/Dextrose/ Fat Emulsion Intravenous 1,400 ml @  

58.333 mls/ hr TPN  CONT IV  Last administered on 4/3/20at 22:19;  Start 4/3/20 

at 22:00;  Stop 4/4/20 at 21:59;  Status DC


Heparin Sodium (Porcine) (Heparin Sodium) 5,000 unit Q12HR SQ  Last administered

on 4/26/20at 08:59;  Start 4/3/20 at 21:00;  Stop 4/26/20 at 10:05;  Status DC


Ondansetron HCl (Zofran) 4 mg PRN Q6HRS  PRN IV NAUSEA/VOMITING;  Start 4/6/20 

at 07:00;  Stop 4/7/20 at 06:59;  Status DC


Fentanyl Citrate (Fentanyl 2ml Vial) 25 mcg PRN Q5MIN  PRN IV MILD PAIN 1-3;  

Start 4/6/20 at 07:00;  Stop 4/7/20 at 06:59;  Status DC


Fentanyl Citrate (Fentanyl 2ml Vial) 50 mcg PRN Q5MIN  PRN IV MODERATE TO SEVERE

PAIN;  Start 4/6/20 at 07:00;  Stop 4/7/20 at 06:59;  Status DC


Ringer's Solution 1,000 ml @  30 mls/hr Q24H IV ;  Start 4/6/20 at 07:00;  Stop 

4/6/20 at 18:59;  Status DC


Lidocaine HCl (Xylocaine-Mpf 1% 2ml Vial) 2 ml PRN 1X  PRN ID PRIOR TO IV START;

 Start 4/6/20 at 07:00;  Stop 4/7/20 at 06:59;  Status DC


Prochlorperazine Edisylate (Compazine) 5 mg PACU PRN  PRN IV NAUSEA, MRX1;  

Start 4/6/20 at 07:00;  Stop 4/7/20 at 06:59;  Status DC


Sodium Chloride 1,000 ml @  1,000 mls/hr Q1H PRN IV hypotension;  Start 4/4/20 

at 09:10;  Stop 4/4/20 at 15:09;  Status DC


Albumin Human 200 ml @  200 mls/hr 1X PRN  PRN IV Hypotension Last administered 

on 4/4/20at 10:10;  Start 4/4/20 at 09:15;  Stop 4/4/20 at 15:14;  Status DC


Sodium Chloride 1,000 ml @  400 mls/hr Q2H30M PRN IV PATENCY;  Start 4/4/20 at 

09:10;  Stop 4/4/20 at 21:09;  Status DC


Info (PHARMACY MONITORING -- do not chart) 1 each PRN DAILY  PRN MC SEE 

COMMENTS;  Start 4/4/20 at 09:15;  Stop 4/5/20 at 12:45;  Status DC


Info (PHARMACY MONITORING -- do not chart) 1 each PRN DAILY  PRN MC SEE COM

MENTS;  Start 4/4/20 at 09:15;  Stop 4/5/20 at 12:45;  Status DC


Sodium Chloride 90 meq/Potassium Chloride 15 meq/ Potassium Phosphate 10 mmol/ 

Magnesium Sulfate 8 meq/Calcium Gluconate 15 meq/ Multivitamins 10 ml/Chromium/ 

Copper/Manganese/ Seleni/Zn 0.5 ml/ Insulin Human Regular 25 unit/ Total 

Parenteral Nutrition/Amino Acids/Dextrose/ Fat Emulsion Intravenous 1,400 ml @  

58.333 mls/ hr TPN  CONT IV  Last administered on 4/4/20at 22:10;  Start 4/4/20 

at 22:00;  Stop 4/5/20 at 21:59;  Status DC


Magnesium Sulfate 50 ml @ 25 mls/hr PRN DAILY  PRN IV for Mag < 1.7 on am labs 

Last administered on 4/20/20at 17:27;  Start 4/5/20 at 09:15


Sodium Chloride 90 meq/Potassium Chloride 15 meq/ Potassium Phosphate 10 mmol/ 

Magnesium Sulfate 8 meq/Calcium Gluconate 15 meq/ Multivitamins 10 ml/Chromium/ 

Copper/Manganese/ Seleni/Zn 0.5 ml/ Insulin Human Regular 25 unit/ Total 

Parenteral Nutrition/Amino Acids/Dextrose/ Fat Emulsion Intravenous 1,400 ml @  

58.333 mls/ hr TPN  CONT IV  Last administered on 4/5/20at 21:20;  Start 4/5/20 

at 22:00;  Stop 4/6/20 at 21:59;  Status DC


Sodium Chloride 1,000 ml @  1,000 mls/hr Q1H PRN IV hypotension;  Start 4/5/20 

at 12:23;  Stop 4/5/20 at 18:22;  Status DC


Albumin Human 200 ml @  200 mls/hr 1X  ONCE IV  Last administered on 4/5/20at 13

:34;  Start 4/5/20 at 12:30;  Stop 4/5/20 at 13:29;  Status DC


Diphenhydramine HCl (Benadryl) 25 mg 1X PRN  PRN IV ITCHING;  Start 4/5/20 at 

12:30;  Stop 4/6/20 at 12:29;  Status DC


Diphenhydramine HCl (Benadryl) 25 mg 1X PRN  PRN IV ITCHING;  Start 4/5/20 at 

12:30;  Stop 4/6/20 at 12:29;  Status DC


Info (PHARMACY MONITORING -- do not chart) 1 each PRN DAILY  PRN MC SEE 

COMMENTS;  Start 4/5/20 at 12:30;  Status Cancel


Bupivacaine HCl/ Epinephrine Bitart (Sensorcain-Epi 0.5%-1:616102 Mpf) 30 ml 

STK-MED ONCE .ROUTE  Last administered on 4/6/20at 11:44;  Start 4/6/20 at 

11:00;  Stop 4/6/20 at 11:01;  Status DC


Cellulose (Surgicel Fibrillar 1x2) 1 each STK-MED ONCE .ROUTE ;  Start 4/6/20 at

11:00;  Stop 4/6/20 at 11:01;  Status DC


Sodium Chloride 90 meq/Potassium Chloride 15 meq/ Potassium Phosphate 10 mmol/ 

Magnesium Sulfate 12 meq/Calcium Gluconate 15 meq/ Multivitamins 10 ml/Chromium/

Copper/Manganese/ Seleni/Zn 0.5 ml/ Insulin Human Regular 25 unit/ Total 

Parenteral Nutrition/Amino Acids/Dextrose/ Fat Emulsion Intravenous 1,400 ml @  

58.333 mls/ hr TPN  CONT IV  Last administered on 4/6/20at 22:24;  Start 4/6/20 

at 22:00;  Stop 4/7/20 at 21:59;  Status DC


Propofol 20 ml @ As Directed STK-MED ONCE IV ;  Start 4/6/20 at 11:07;  Stop 

4/6/20 at 11:07;  Status DC


Cellulose (Surgicel Hemostat 4x8) 1 each STK-MED ONCE .ROUTE  Last administered 

on 4/6/20at 11:44;  Start 4/6/20 at 11:55;  Stop 4/6/20 at 11:56;  Status DC


Sevoflurane (Ultane) 60 ml STK-MED ONCE IH ;  Start 4/6/20 at 12:46;  Stop 

4/6/20 at 12:46;  Status DC


Sodium Chloride 1,000 ml @  1,000 mls/hr Q1H PRN IV hypotension;  Start 4/6/20 

at 13:51;  Stop 4/6/20 at 19:50;  Status DC


Albumin Human 200 ml @  200 mls/hr 1X PRN  PRN IV Hypotension Last administered 

on 4/6/20at 14:51;  Start 4/6/20 at 14:00;  Stop 4/6/20 at 19:59;  Status DC


Diphenhydramine HCl (Benadryl) 25 mg 1X PRN  PRN IV ITCHING;  Start 4/6/20 at 

14:00;  Stop 4/7/20 at 13:59;  Status DC


Diphenhydramine HCl (Benadryl) 25 mg 1X PRN  PRN IV ITCHING;  Start 4/6/20 at 

14:00;  Stop 4/7/20 at 13:59;  Status DC


Sodium Chloride 1,000 ml @  400 mls/hr Q2H30M PRN IV PATENCY;  Start 4/6/20 at 

13:51;  Stop 4/7/20 at 01:50;  Status DC


Info (PHARMACY MONITORING -- do not chart) 1 each PRN DAILY  PRN MC SEE 

COMMENTS;  Start 4/6/20 at 14:00;  Stop 4/9/20 at 08:16;  Status DC


Heparin Sodium (Porcine) (Hep Lock Adult) 500 unit STK-MED ONCE IVP ;  Start 

4/7/20 at 09:29;  Stop 4/7/20 at 09:30;  Status DC


Sodium Chloride 1,000 ml @  1,000 mls/hr Q1H PRN IV hypotension;  Start 4/7/20 

at 10:43;  Stop 4/7/20 at 16:42;  Status DC


Sodium Chloride 1,000 ml @  400 mls/hr Q2H30M PRN IV PATENCY;  Start 4/7/20 at 

10:43;  Stop 4/7/20 at 22:42;  Status DC


Info (PHARMACY MONITORING -- do not chart) 1 each PRN DAILY  PRN MC SEE 

COMMENTS;  Start 4/7/20 at 10:45;  Status UNV


Info (PHARMACY MONITORING -- do not chart) 1 each PRN DAILY  PRN MC SEE 

COMMENTS;  Start 4/7/20 at 10:45;  Status UNV


Sodium Chloride 90 meq/Potassium Chloride 15 meq/ Magnesium Sulfate 12 

meq/Calcium Gluconate 15 meq/ Multivitamins 10 ml/Chromium/ Copper/Manganese/ 

Seleni/Zn 0.5 ml/ Insulin Human Regular 25 unit/ Total Parenteral 

Nutrition/Amino Acids/Dextrose/ Fat Emulsion Intravenous 1,400 ml @  58.333 mls/

hr TPN  CONT IV  Last administered on 4/7/20at 22:13;  Start 4/7/20 at 22:00;  

Stop 4/8/20 at 21:59;  Status DC


Sodium Chloride 1,000 ml @  1,000 mls/hr Q1H PRN IV hypotension;  Start 4/8/20 

at 07:50;  Stop 4/8/20 at 13:49;  Status DC


Albumin Human 200 ml @  200 mls/hr 1X  ONCE IV ;  Start 4/8/20 at 08:00;  Stop 

4/8/20 at 08:53;  Status DC


Diphenhydramine HCl (Benadryl) 25 mg 1X PRN  PRN IV ITCHING;  Start 4/8/20 at 

08:00;  Stop 4/9/20 at 07:59;  Status DC


Diphenhydramine HCl (Benadryl) 25 mg 1X PRN  PRN IV ITCHING;  Start 4/8/20 at 

08:00;  Stop 4/9/20 at 07:59;  Status DC


Info (PHARMACY MONITORING -- do not chart) 1 each PRN DAILY  PRN MC SEE 

COMMENTS;  Start 4/8/20 at 08:00;  Stop 4/9/20 at 08:16;  Status DC


Albumin Human 50 ml @ 50 mls/hr 1X  ONCE IV ;  Start 4/8/20 at 08:53;  Stop 

4/8/20 at 08:56;  Status DC


Albumin Human 200 ml @  50 mls/hr PRN 1X  PRN IV HYPOTENSION Last administered 

on 4/14/20at 11:54;  Start 4/8/20 at 09:00


Meropenem 500 mg/ Sodium Chloride 50 ml @  100 mls/hr Q12H IV  Last administered

on 4/28/20at 10:45;  Start 4/8/20 at 10:00;  Stop 4/28/20 at 12:37;  Status DC


Sodium Chloride 90 meq/Magnesium Sulfate 12 meq/ Calcium Gluconate 15 meq/ 

Multivitamins 10 ml/Chromium/ Copper/Manganese/ Seleni/Zn 0.5 ml/ Insulin Human 

Regular 25 unit/ Total Parenteral Nutrition/Amino Acids/Dextrose/ Fat Emulsion 

Intravenous 1,400 ml @  58.333 mls/ hr TPN  CONT IV  Last administered on 

4/8/20at 21:41;  Start 4/8/20 at 22:00;  Stop 4/9/20 at 21:59;  Status DC


Sodium Chloride 1,000 ml @  1,000 mls/hr Q1H PRN IV hypotension;  Start 4/9/20 

at 07:58;  Stop 4/9/20 at 13:57;  Status DC


Albumin Human 200 ml @  200 mls/hr 1X PRN  PRN IV Hypotension Last administered 

on 4/9/20at 09:30;  Start 4/9/20 at 08:00;  Stop 4/9/20 at 13:59;  Status DC


Sodium Chloride 1,000 ml @  400 mls/hr Q2H30M PRN IV PATENCY;  Start 4/9/20 at 

07:58;  Stop 4/9/20 at 19:57;  Status DC


Info (PHARMACY MONITORING -- do not chart) 1 each PRN DAILY  PRN MC SEE 

COMMENTS;  Start 4/9/20 at 08:00;  Status Cancel


Info (PHARMACY MONITORING -- do not chart) 1 each PRN DAILY  PRN MC SEE 

COMMENTS;  Start 4/9/20 at 08:15;  Status UNV


Sodium Chloride 90 meq/Potassium Phosphate 5 mmol/ Magnesium Sulfate 12 

meq/Calcium Gluconate 15 meq/ Multivitamins 10 ml/Chromium/ Copper/Manganese/ 

Seleni/Zn 0.5 ml/ Insulin Human Regular 30 unit/ Total Parenteral 

Nutrition/Amino Acids/Dextrose/ Fat Emulsion Intravenous 1,400 ml @  58.333 mls/

hr TPN  CONT IV  Last administered on 4/9/20at 22:08;  Start 4/9/20 at 22:00;  

Stop 4/10/20 at 21:59;  Status DC


Linezolid/Dextrose 300 ml @  300 mls/hr Q12HR IV  Last administered on 4/20/20at

20:40;  Start 4/10/20 at 11:00;  Stop 4/21/20 at 08:10;  Status DC


Sodium Chloride 90 meq/Potassium Phosphate 15 mmol/ Magnesium Sulfate 12 

meq/Calcium Gluconate 15 meq/ Multivitamins 10 ml/Chromium/ Copper/Manganese/ 

Seleni/Zn 0.5 ml/ Insulin Human Regular 30 unit/ Total Parenteral 

Nutrition/Amino Acids/Dextrose/ Fat Emulsion Intravenous 1,400 ml @  58.333 mls/

hr TPN  CONT IV  Last administered on 4/10/20at 21:49;  Start 4/10/20 at 22:00; 

Stop 4/11/20 at 21:59;  Status DC


Sodium Chloride 90 meq/Potassium Phosphate 15 mmol/ Magnesium Sulfate 12 

meq/Calcium Gluconate 15 meq/ Multivitamins 10 ml/Chromium/ Copper/Manganese/ 

Seleni/Zn 0.5 ml/ Insulin Human Regular 40 unit/ Total Parenteral 

Nutrition/Amino Acids/Dextrose/ Fat Emulsion Intravenous 1,400 ml @  58.333 mls/

hr TPN  CONT IV  Last administered on 4/11/20at 21:21;  Start 4/11/20 at 22:00; 

Stop 4/12/20 at 21:59;  Status DC


Sodium Chloride 1,000 ml @  1,000 mls/hr Q1H PRN IV hypotension;  Start 4/11/20 

at 13:26;  Stop 4/11/20 at 19:25;  Status DC


Albumin Human 200 ml @  200 mls/hr 1X PRN  PRN IV Hypotension Last administered 

on 4/11/20at 15:00;  Start 4/11/20 at 13:30;  Stop 4/11/20 at 19:29;  Status DC


Sodium Chloride (Normal Saline Flush) 10 ml 1X PRN  PRN IV AP catheter pack;  

Start 4/11/20 at 13:30;  Stop 4/12/20 at 13:29;  Status DC


Sodium Chloride (Normal Saline Flush) 10 ml 1X PRN  PRN IV  catheter pack;  

Start 4/11/20 at 13:30;  Stop 4/12/20 at 13:29;  Status DC


Sodium Chloride 1,000 ml @  400 mls/hr Q2H30M PRN IV PATENCY;  Start 4/11/20 at 

13:26;  Stop 4/12/20 at 01:25;  Status DC


Info (PHARMACY MONITORING -- do not chart) 1 each PRN DAILY  PRN MC SEE 

COMMENTS;  Start 4/11/20 at 13:30;  Stop 4/11/20 at 13:33;  Status DC


Info (PHARMACY MONITORING -- do not chart) 1 each PRN DAILY  PRN MC SEE 

COMMENTS;  Start 4/11/20 at 13:30;  Stop 4/11/20 at 13:34;  Status DC


Sodium Chloride 90 meq/Potassium Phosphate 19 mmol/ Magnesium Sulfate 12 

meq/Calcium Gluconate 15 meq/ Multivitamins 10 ml/Chromium/ Copper/Manganese/ 

Seleni/Zn 0.5 ml/ Insulin Human Regular 40 unit/ Total Parenteral 

Nutrition/Amino Acids/Dextrose/ Fat Emulsion Intravenous 1,400 ml @  58.333 mls/

hr TPN  CONT IV  Last administered on 4/12/20at 21:54;  Start 4/12/20 at 22:00; 

Stop 4/13/20 at 21:59;  Status DC


Sodium Chloride 1,000 ml @  1,000 mls/hr Q1H PRN IV hypotension;  Start 4/13/20 

at 09:35;  Stop 4/13/20 at 15:34;  Status DC


Albumin Human 200 ml @  200 mls/hr 1X PRN  PRN IV Hypotension;  Start 4/13/20 at

09:45;  Stop 4/13/20 at 15:44;  Status DC


Diphenhydramine HCl (Benadryl) 25 mg 1X PRN  PRN IV ITCHING;  Start 4/13/20 at 

09:45;  Stop 4/14/20 at 09:44;  Status DC


Diphenhydramine HCl (Benadryl) 25 mg 1X PRN  PRN IV ITCHING;  Start 4/13/20 at 

09:45;  Stop 4/14/20 at 09:44;  Status DC


Sodium Chloride 1,000 ml @  400 mls/hr Q2H30M PRN IV PATENCY;  Start 4/13/20 at 

09:35;  Stop 4/13/20 at 21:34;  Status DC


Info (PHARMACY MONITORING -- do not chart) 1 each PRN DAILY  PRN MC SEE 

COMMENTS;  Start 4/13/20 at 09:45;  Status Cancel


Sodium Chloride 100 meq/Potassium Phosphate 19 mmol/ Magnesium Sulfate 12 

meq/Calcium Gluconate 15 meq/ Multivitamins 10 ml/Chromium/ Copper/Manganese/ 

Seleni/Zn 0.5 ml/ Insulin Human Regular 40 unit/ Potassium Chloride 20 meq/ 

Total Parenteral Nutrition/Amino Acids/Dextrose/ Fat Emulsion Intravenous 1,400 

ml @  58.333 mls/ hr TPN  CONT IV  Last administered on 4/13/20at 22:02;  Start 

4/13/20 at 22:00;  Stop 4/14/20 at 21:59;  Status DC


Furosemide (Lasix) 40 mg 1X  ONCE IVP  Last administered on 4/13/20at 14:39;  

Start 4/13/20 at 14:30;  Stop 4/13/20 at 14:31;  Status DC


Metronidazole 100 ml @  100 mls/hr Q8HRS IV  Last administered on 4/21/20at 

06:04;  Start 4/14/20 at 10:00;  Stop 4/21/20 at 08:10;  Status DC


Sodium Chloride 1,000 ml @  1,000 mls/hr Q1H PRN IV hypotension;  Start 4/14/20 

at 08:00;  Stop 4/14/20 at 13:59;  Status DC


Albumin Human 200 ml @  200 mls/hr 1X PRN  PRN IV Hypotension;  Start 4/14/20 at

08:00;  Stop 4/14/20 at 13:59;  Status DC


Sodium Chloride 1,000 ml @  400 mls/hr Q2H30M PRN IV PATENCY;  Start 4/14/20 at 

08:00;  Stop 4/14/20 at 19:59;  Status DC


Info (PHARMACY MONITORING -- do not chart) 1 each PRN DAILY  PRN MC SEE 

COMMENTS;  Start 4/14/20 at 11:30;  Status UNV


Info (PHARMACY MONITORING -- do not chart) 1 each PRN DAILY  PRN MC SEE 

COMMENTS;  Start 4/14/20 at 11:30;  Stop 4/16/20 at 12:13;  Status DC


Sodium Chloride 100 meq/Potassium Phosphate 19 mmol/ Magnesium Sulfate 12 

meq/Calcium Gluconate 15 meq/ Multivitamins 10 ml/Chromium/ Copper/Manganese/ 

Seleni/Zn 0.5 ml/ Insulin Human Regular 40 unit/ Potassium Chloride 20 meq/ 

Total Parenteral Nutrition/Amino Acids/Dextrose/ Fat Emulsion Intravenous 1,400 

ml @  58.333 mls/ hr TPN  CONT IV  Last administered on 4/14/20at 21:52;  Start 

4/14/20 at 22:00;  Stop 4/15/20 at 21:59;  Status DC


Sodium Chloride (Normal Saline Flush) 10 ml QSHIFT  PRN IV AFTER MEDS AND BLOOD 

DRAWS;  Start 4/14/20 at 15:00;  Stop 5/12/20 at 11:27;  Status DC


Sodium Chloride (Normal Saline Flush) 10 ml PRN Q5MIN  PRN IV AFTER MEDS AND 

BLOOD DRAWS;  Start 4/14/20 at 15:00


Sodium Chloride (Normal Saline Flush) 20 ml PRN Q5MIN  PRN IV AFTER MEDS AND 

BLOOD DRAWS;  Start 4/14/20 at 15:00


Sodium Chloride 100 meq/Potassium Phosphate 19 mmol/ Magnesium Sulfate 12 

meq/Calcium Gluconate 15 meq/ Multivitamins 10 ml/Chromium/ Copper/Manganese/ 

Seleni/Zn 0.5 ml/ Insulin Human Regular 40 unit/ Potassium Chloride 20 meq/ 

Total Parenteral Nutrition/Amino Acids/Dextrose/ Fat Emulsion Intravenous 1,400 

ml @  58.333 mls/ hr TPN  CONT IV  Last administered on 4/15/20at 21:20;  Start 

4/15/20 at 22:00;  Stop 4/16/20 at 21:59;  Status DC


Lidocaine HCl (Buffered Lidocaine 1%) 3 ml STK-MED ONCE .ROUTE ;  Start 4/15/20 

at 13:16;  Stop 4/15/20 at 13:16;  Status DC


Lidocaine HCl (Buffered Lidocaine 1%) 6 ml 1X  ONCE INJ  Last administered on 

4/15/20at 13:45;  Start 4/15/20 at 13:30;  Stop 4/15/20 at 13:31;  Status DC


Albumin Human 100 ml @  100 mls/hr 1X  ONCE IV  Last administered on 4/15/20at 

15:41;  Start 4/15/20 at 15:00;  Stop 4/15/20 at 15:59;  Status DC


Albumin Human 50 ml @ 50 mls/hr 1X  ONCE IV  Last administered on 4/15/20at 

15:00;  Start 4/15/20 at 15:00;  Stop 4/15/20 at 15:59;  Status DC


Info (PHARMACY MONITORING -- do not chart) 1 each PRN DAILY  PRN MC SEE 

COMMENTS;  Start 4/16/20 at 11:30;  Status Cancel


Info (PHARMACY MONITORING -- do not chart) 1 each PRN DAILY  PRN MC SEE 

COMMENTS;  Start 4/16/20 at 11:30;  Status UNV


Sodium Chloride 100 meq/Potassium Phosphate 10 mmol/ Magnesium Sulfate 12 

meq/Calcium Gluconate 15 meq/ Multivitamins 10 ml/Chromium/ Copper/Manganese/ Se

aram/Zn 0.5 ml/ Insulin Human Regular 35 unit/ Potassium Chloride 20 meq/ Total 

Parenteral Nutrition/Amino Acids/Dextrose/ Fat Emulsion Intravenous 1,400 ml @  

58.333 mls/ hr TPN  CONT IV  Last administered on 4/16/20at 22:10;  Start 

4/16/20 at 22:00;  Stop 4/17/20 at 21:59;  Status DC


Sodium Chloride 100 meq/Potassium Phosphate 5 mmol/ Magnesium Sulfate 12 

meq/Calcium Gluconate 15 meq/ Multivitamins 10 ml/Chromium/ Copper/Manganese/ 

Seleni/Zn 0.5 ml/ Insulin Human Regular 35 unit/ Potassium Chloride 20 meq/ 

Total Parenteral Nutrition/Amino Acids/Dextrose/ Fat Emulsion Intravenous 1,400 

ml @  58.333 mls/ hr TPN  CONT IV  Last administered on 4/17/20at 22:59;  Start 

4/17/20 at 22:00;  Stop 4/18/20 at 21:59;  Status DC


Sodium Chloride 1,000 ml @  1,000 mls/hr Q1H PRN IV hypotension;  Start 4/18/20 

at 08:27;  Stop 4/18/20 at 14:26;  Status DC


Albumin Human 200 ml @  200 mls/hr 1X PRN  PRN IV Hypotension Last administered 

on 4/18/20at 09:18;  Start 4/18/20 at 08:30;  Stop 4/18/20 at 14:29;  Status DC


Sodium Chloride 1,000 ml @  400 mls/hr Q2H30M PRN IV PATENCY;  Start 4/18/20 at 

08:27;  Stop 4/18/20 at 20:26;  Status DC


Info (PHARMACY MONITORING -- do not chart) 1 each PRN DAILY  PRN MC SEE 

COMMENTS;  Start 4/18/20 at 08:30;  Status Cancel


Info (PHARMACY MONITORING -- do not chart) 1 each PRN DAILY  PRN MC SEE 

COMMENTS;  Start 4/18/20 at 08:30;  Stop 4/26/20 at 13:10;  Status DC


Sodium Chloride 100 meq/Potassium Chloride 40 meq/ Magnesium Sulfate 15 

meq/Calcium Gluconate 15 meq/ Multivitamins 10 ml/Chromium/ Copper/Manganese/ 

Seleni/Zn 0.5 ml/ Insulin Human Regular 35 unit/ Total Parenteral 

Nutrition/Amino Acids/Dextrose/ Fat Emulsion Intravenous 1,400 ml @  58.333 mls/

hr TPN  CONT IV  Last administered on 4/18/20at 22:00;  Start 4/18/20 at 22:00; 

Stop 4/19/20 at 21:59;  Status DC


Potassium Chloride/Water 100 ml @  100 mls/hr 1X  ONCE IV  Last administered on 

4/18/20at 17:28;  Start 4/18/20 at 14:45;  Stop 4/18/20 at 15:44;  Status DC


Sodium Chloride 100 meq/Potassium Chloride 40 meq/ Magnesium Sulfate 15 

meq/Calcium Gluconate 15 meq/ Multivitamins 10 ml/Chromium/ Copper/Manganese/ 

Seleni/Zn 0.5 ml/ Insulin Human Regular 35 unit/ Total Parenteral 

Nutrition/Amino Acids/Dextrose/ Fat Emulsion Intravenous 1,400 ml @  58.333 mls/

hr TPN  CONT IV  Last administered on 4/19/20at 22:46;  Start 4/19/20 at 22:00; 

Stop 4/20/20 at 21:59;  Status DC


Sodium Chloride 100 meq/Potassium Chloride 40 meq/ Magnesium Sulfate 20 

meq/Calcium Gluconate 15 meq/ Multivitamins 10 ml/Chromium/ Copper/Manganese/ 

Seleni/Zn 0.5 ml/ Insulin Human Regular 35 unit/ Total Parenteral 

Nutrition/Amino Acids/Dextrose/ Fat Emulsion Intravenous 1,400 ml @  58.333 mls/

hr TPN  CONT IV  Last administered on 4/20/20at 22:31;  Start 4/20/20 at 22:00; 

Stop 4/21/20 at 21:59;  Status DC


Fentanyl Citrate (Fentanyl 2ml Vial) 50 mcg PRN Q2HR  PRN IVP PAIN Last 

administered on 4/27/20at 13:32;  Start 4/20/20 at 21:00;  Stop 4/28/20 at 

12:53;  Status DC


Fentanyl Citrate (Fentanyl 2ml Vial) 25 mcg PRN Q2HR  PRN IVP PAIN;  Start 

4/20/20 at 21:00;  Stop 4/28/20 at 12:54;  Status DC


Enoxaparin Sodium (Lovenox 100mg Syringe) 100 mg Q12HR SQ ;  Start 4/21/20 at 

21:00;  Status UNV


Amino Acids/ Glycerin/ Electrolytes 1,000 ml @  75 mls/hr [id number] IV ;  Start 

4/20/20 at 21:15;  Status UNV


Sodium Chloride 1,000 ml @  1,000 mls/hr Q1H PRN IV hypotension;  Start 4/21/20 

at 07:56;  Stop 4/21/20 at 13:55;  Status DC


Albumin Human 200 ml @  200 mls/hr 1X PRN  PRN IV Hypotension Last administered 

on 4/21/20at 08:40;  Start 4/21/20 at 08:00;  Stop 4/21/20 at 13:59;  Status DC


Sodium Chloride 1,000 ml @  400 mls/hr Q2H30M PRN IV PATENCY;  Start 4/21/20 at 

07:56;  Stop 4/21/20 at 19:55;  Status DC


Info (PHARMACY MONITORING -- do not chart) 1 each PRN DAILY  PRN MC SEE 

COMMENTS;  Start 4/21/20 at 08:00;  Status UNV


Info (PHARMACY MONITORING -- do not chart) 1 each PRN DAILY  PRN MC SEE 

COMMENTS;  Start 4/21/20 at 08:00;  Status UNV


Daptomycin 430 mg/ Sodium Chloride 50 ml @  100 mls/hr Q24H IV  Last 

administered on 4/21/20at 12:35;  Start 4/21/20 at 09:00;  Stop 4/21/20 at 

12:49;  Status DC


Sodium Chloride 100 meq/Potassium Chloride 40 meq/ Magnesium Sulfate 20 

meq/Calcium Gluconate 15 meq/ Multivitamins 10 ml/Chromium/ Copper/Manganese/ 

Seleni/Zn 0.5 ml/ Insulin Human Regular 35 unit/ Total Parenteral 

Nutrition/Amino Acids/Dextrose/ Fat Emulsion Intravenous 1,400 ml @  58.333 mls/

hr TPN  CONT IV  Last administered on 4/21/20at 21:26;  Start 4/21/20 at 22:00; 

Stop 4/22/20 at 21:59;  Status DC


Daptomycin 430 mg/ Sodium Chloride 50 ml @  100 mls/hr Q48H IV ;  Start 4/23/20 

at 09:00;  Stop 4/22/20 at 11:55;  Status DC


Sodium Chloride 100 meq/Potassium Chloride 40 meq/ Magnesium Sulfate 20 

meq/Calcium Gluconate 15 meq/ Multivitamins 10 ml/Chromium/ Copper/Manganese/ 

Seleni/Zn 0.5 ml/ Insulin Human Regular 35 unit/ Total Parenteral 

Nutrition/Amino Acids/Dextrose/ Fat Emulsion Intravenous 1,400 ml @  58.333 mls/

hr TPN  CONT IV  Last administered on 4/22/20at 22:27;  Start 4/22/20 at 22:00; 

Stop 4/23/20 at 21:59;  Status DC


Daptomycin 430 mg/ Sodium Chloride 50 ml @  100 mls/hr Q24H IV  Last admini

stered on 4/24/20at 15:07;  Start 4/22/20 at 13:00;  Stop 4/25/20 at 13:15;  

Status DC


Sodium Chloride 100 meq/Potassium Chloride 40 meq/ Magnesium Sulfate 20 

meq/Calcium Gluconate 10 meq/ Multivitamins 10 ml/Chromium/ Copper/Manganese/ 

Seleni/Zn 0.5 ml/ Insulin Human Regular 35 unit/ Total Parenteral 

Nutrition/Amino Acids/Dextrose/ Fat Emulsion Intravenous 1,400 ml @  58.333 mls/

hr TPN  CONT IV  Last administered on 4/24/20at 00:06;  Start 4/23/20 at 22:00; 

Stop 4/24/20 at 21:59;  Status DC


Alteplase, Recombinant (Cathflo For Central Catheter Clearance) 1 mg 1X  ONCE 

INT CAT  Last administered on 4/24/20at 11:44;  Start 4/24/20 at 10:45;  Stop 

4/24/20 at 10:46;  Status DC


Ondansetron HCl (Zofran) 4 mg PRN Q6HRS  PRN IV NAUSEA/VOMITING;  Start 4/27/20 

at 07:00;  Stop 4/28/20 at 06:59;  Status DC


Fentanyl Citrate (Fentanyl 2ml Vial) 25 mcg PRN Q5MIN  PRN IV MILD PAIN 1-3;  

Start 4/27/20 at 07:00;  Stop 4/28/20 at 06:59;  Status DC


Fentanyl Citrate (Fentanyl 2ml Vial) 50 mcg PRN Q5MIN  PRN IV MODERATE TO SEVERE

PAIN Last administered on 4/27/20at 10:17;  Start 4/27/20 at 07:00;  Stop 

4/28/20 at 06:59;  Status DC


Ringer's Solution 1,000 ml @  30 mls/hr Q24H IV ;  Start 4/27/20 at 07:00;  Stop

4/27/20 at 18:59;  Status DC


Lidocaine HCl (Xylocaine-Mpf 1% 2ml Vial) 2 ml PRN 1X  PRN ID PRIOR TO IV START;

 Start 4/27/20 at 07:00;  Stop 4/28/20 at 06:59;  Status DC


Prochlorperazine Edisylate (Compazine) 5 mg PACU PRN  PRN IV NAUSEA, MRX1;  

Start 4/27/20 at 07:00;  Stop 4/28/20 at 06:59;  Status DC


Sodium Acetate 50 meq/Potassium Acetate 55 meq/ Magnesium Sulfate 20 meq/Calcium

Gluconate 10 meq/ Multivitamins 10 ml/Chromium/ Copper/Manganese/ Seleni/Zn 0.5 

ml/ Insulin Human Regular 35 unit/ Total Parenteral Nutrition/Amino 

Acids/Dextrose/ Fat Emulsion Intravenous 1,400 ml @  58.333 mls/ hr TPN  CONT IV

;  Start 4/24/20 at 22:00;  Stop 4/24/20 at 14:15;  Status DC


Sodium Acetate 50 meq/Potassium Acetate 55 meq/ Magnesium Sulfate 20 meq/Calcium

Gluconate 10 meq/ Multivitamins 10 ml/Chromium/ Copper/Manganese/ Seleni/Zn 0.5 

ml/ Insulin Human Regular 35 unit/ Total Parenteral Nutrition/Amino 

Acids/Dextrose/ Fat Emulsion Intravenous 1,800 ml @  75 mls/hr TPN  CONT IV  

Last administered on 4/24/20at 22:38;  Start 4/24/20 at 22:00;  Stop 4/25/20 at 

21:59;  Status DC


Sodium Chloride 1,000 ml @  1,000 mls/hr Q1H PRN IV hypotension;  Start 4/24/20 

at 15:31;  Stop 4/24/20 at 21:30;  Status DC


Diphenhydramine HCl (Benadryl) 25 mg 1X PRN  PRN IV ITCHING;  Start 4/24/20 at 

15:45;  Stop 4/25/20 at 15:44;  Status DC


Diphenhydramine HCl (Benadryl) 25 mg 1X PRN  PRN IV ITCHING;  Start 4/24/20 at 

15:45;  Stop 4/25/20 at 15:44;  Status DC


Sodium Chloride 1,000 ml @  400 mls/hr Q2H30M PRN IV PATENCY;  Start 4/24/20 at 

15:31;  Stop 4/25/20 at 03:30;  Status DC


Info (PHARMACY MONITORING -- do not chart) 1 each PRN DAILY  PRN MC SEE 

COMMENTS;  Start 4/24/20 at 15:45


Sodium Acetate 50 meq/Potassium Acetate 55 meq/ Magnesium Sulfate 20 meq/Calcium

Gluconate 10 meq/ Multivitamins 10 ml/Chromium/ Copper/Manganese/ Seleni/Zn 0.5 

ml/ Insulin Human Regular 35 unit/ Total Parenteral Nutrition/Amino 

Acids/Dextrose/ Fat Emulsion Intravenous 1,800 ml @  75 mls/hr TPN  CONT IV  

Last administered on 4/25/20at 22:03;  Start 4/25/20 at 22:00;  Stop 4/26/20 at 

21:59;  Status DC


Daptomycin 430 mg/ Sodium Chloride 50 ml @  100 mls/hr Q24H IV  Last 

administered on 4/30/20at 13:00;  Start 4/25/20 at 13:00;  Stop 4/30/20 at 

20:58;  Status DC


Heparin Sodium (Porcine) 1000 unit/Sodium Chloride 1,001 ml @  1,001 mls/hr 1X  

ONCE IRR ;  Start 4/27/20 at 06:00;  Stop 4/27/20 at 06:59;  Status DC


Potassium Acetate 55 meq/Magnesium Sulfate 20 meq/ Calcium Gluconate 10 meq/ 

Multivitamins 10 ml/Chromium/ Copper/Manganese/ Seleni/Zn 0.5 ml/ Insulin Human 

Regular 35 unit/ Total Parenteral Nutrition/Amino Acids/Dextrose/ Fat Emulsion 

Intravenous 1,920 ml @  80 mls/hr TPN  CONT IV  Last administered on 4/26/20at 

22:10;  Start 4/26/20 at 22:00;  Stop 4/27/20 at 21:59;  Status DC


Dexamethasone Sodium Phosphate (Decadron) 4 mg STK-MED ONCE .ROUTE ;  Start 

4/27/20 at 10:56;  Stop 4/27/20 at 10:57;  Status DC


Ondansetron HCl (Zofran) 4 mg STK-MED ONCE .ROUTE ;  Start 4/27/20 at 10:56;  

Stop 4/27/20 at 10:57;  Status DC


Rocuronium Bromide (Zemuron) 50 mg STK-MED ONCE .ROUTE ;  Start 4/27/20 at 

10:56;  Stop 4/27/20 at 10:57;  Status DC


Fentanyl Citrate (Fentanyl 2ml Vial) 100 mcg STK-MED ONCE .ROUTE ;  Start 

4/27/20 at 10:56;  Stop 4/27/20 at 10:57;  Status DC


Bupivacaine HCl/ Epinephrine Bitart (Sensorcain-Epi 0.5%-1:122695 Mpf) 30 ml 

STK-MED ONCE .ROUTE  Last administered on 4/27/20at 12:01;  Start 4/27/20 at 

10:58;  Stop 4/27/20 at 10:58;  Status DC


Cellulose (Surgicel Hemostat 2x14) 1 each STK-MED ONCE .ROUTE ;  Start 4/27/20 

at 10:58;  Stop 4/27/20 at 10:59;  Status DC


Iohexol (Omnipaque 300 Mg/ml) 50 ml STK-MED ONCE .ROUTE ;  Start 4/27/20 at 

10:58;  Stop 4/27/20 at 10:59;  Status DC


Cellulose (Surgicel Hemostat 4x8) 1 each STK-MED ONCE .ROUTE ;  Start 4/27/20 at

10:58;  Stop 4/27/20 at 10:59;  Status DC


Bisacodyl (Dulcolax Supp) 10 mg STK-MED ONCE .ROUTE ;  Start 4/27/20 at 10:59;  

Stop 4/27/20 at 10:59;  Status DC


Heparin Sodium (Porcine) 1000 unit/Sodium Chloride 1,001 ml @  1,001 mls/hr 1X  

ONCE IRR ;  Start 4/27/20 at 12:00;  Stop 4/27/20 at 12:59;  Status DC


Propofol 20 ml @ As Directed STK-MED ONCE IV ;  Start 4/27/20 at 11:05;  Stop 4 /27/20 at 11:05;  Status DC


Sevoflurane (Ultane) 90 ml STK-MED ONCE IH ;  Start 4/27/20 at 11:05;  Stop 

4/27/20 at 11:05;  Status DC


Sevoflurane (Ultane) 60 ml STK-MED ONCE IH ;  Start 4/27/20 at 12:26;  Stop 

4/27/20 at 12:27;  Status DC


Propofol 20 ml @ As Directed STK-MED ONCE IV ;  Start 4/27/20 at 12:26;  Stop 

4/27/20 at 12:27;  Status DC


Phenylephrine HCl (PHENYLEPHRINE in 0.9% NACL PF) 1 mg STK-MED ONCE IV ;  Start 

4/27/20 at 12:34;  Stop 4/27/20 at 12:34;  Status DC


Heparin Sodium (Porcine) (Heparin Sodium) 5,000 unit Q12HR SQ  Last administered

on 5/6/20at 20:57;  Start 4/27/20 at 21:00;  Stop 5/7/20 at 09:59;  Status DC


Sodium Chloride (Normal Saline Flush) 3 ml QSHIFT  PRN IV AFTER MEDS AND BLOOD 

DRAWS;  Start 4/27/20 at 13:45


Naloxone HCl (Narcan) 0.4 mg PRN Q2MIN  PRN IV SEE INSTRUCTIONS;  Start 4/27/20 

at 13:45


Sodium Chloride 1,000 ml @  25 mls/hr Q24H IV  Last administered on 5/17/20at 

13:37;  Start 4/27/20 at 13:37


Naloxone HCl (Narcan) 0.4 mg PRN Q2MIN  PRN IV SEE INSTRUCTIONS;  Start 4/27/20 

at 14:30;  Status UNV


Sodium Chloride 1,000 ml @  25 mls/hr Q24H IV ;  Start 4/27/20 at 14:30;  Status

UNV


Hydromorphone HCl 30 ml @ 0 mls/hr CONT PRN  PRN IV PER PROTOCOL Last 

administered on 5/2/20at 16:08;  Start 4/27/20 at 14:30;  Stop 5/4/20 at 08:55; 

Status DC


Potassium Acetate 55 meq/Magnesium Sulfate 20 meq/ Calcium Gluconate 10 meq/ 

Multivitamins 10 ml/Chromium/ Copper/Manganese/ Seleni/Zn 0.5 ml/ Insulin Human 

Regular 35 unit/ Total Parenteral Nutrition/Amino Acids/Dextrose/ Fat Emulsion 

Intravenous 1,920 ml @  80 mls/hr TPN  CONT IV  Last administered on 4/27/20at 

22:01;  Start 4/27/20 at 22:00;  Stop 4/28/20 at 21:59;  Status DC


Bumetanide (Bumex) 2 mg [id number] IV  Last administered on 5/1/20at 13:50;  Start 

4/28/20 at 14:00;  Stop 5/2/20 at 14:10;  Status DC


Meropenem 1 gm/ Sodium Chloride 100 ml @  200 mls/hr Q8HRS IV  Last administered

on 5/17/20at 14:04;  Start 4/28/20 at 14:00


Potassium Acetate 55 meq/Magnesium Sulfate 20 meq/ Calcium Gluconate 10 meq/ 

Multivitamins 10 ml/Chromium/ Copper/Manganese/ Seleni/Zn 0.5 ml/ Insulin Human 

Regular 35 unit/ Total Parenteral Nutrition/Amino Acids/Dextrose/ Fat Emulsion 

Intravenous 1,920 ml @  80 mls/hr TPN  CONT IV  Last administered on 4/28/20at 

22:02;  Start 4/28/20 at 22:00;  Stop 4/29/20 at 21:59;  Status DC


Hydromorphone HCl (Dilaudid Standard PCA) 12 mg STK-MED ONCE IV ;  Start 4/27/20

at 14:35;  Stop 4/28/20 at 13:53;  Status DC


Artificial Tears (Artificial Tears) 1 drop PRN Q15MIN  PRN OU DRY EYE Last 

administered on 5/8/20at 22:00;  Start 4/29/20 at 05:30


Hydromorphone HCl (Dilaudid Standard PCA) 12 mg STK-MED ONCE IV ;  Start 4/28/20

at 12:05;  Stop 4/29/20 at 09:15;  Status DC


Potassium Acetate 65 meq/Magnesium Sulfate 20 meq/ Calcium Gluconate 10 meq/ 

Multivitamins 10 ml/Chromium/ Copper/Manganese/ Seleni/Zn 0.5 ml/ Insulin Human 

Regular 30 unit/ Total Parenteral Nutrition/Amino Acids/Dextrose/ Fat Emulsion 

Intravenous 1,920 ml @  80 mls/hr TPN  CONT IV  Last administered on 4/29/20at 

22:22;  Start 4/29/20 at 22:00;  Stop 4/30/20 at 21:59;  Status DC


Cyclobenzaprine HCl (Flexeril) 10 mg PRN Q6HRS  PRN PO MUSCLE SPASMS;  Start 

4/30/20 at 10:45


Potassium Acetate 55 meq/Magnesium Sulfate 20 meq/ Calcium Gluconate 10 meq/ 

Multivitamins 10 ml/Chromium/ Copper/Manganese/ Seleni/Zn 0.5 ml/ Insulin Human 

Regular 30 unit/ Total Parenteral Nutrition/Amino Acids/Dextrose/ Fat Emulsion 

Intravenous 1,920 ml @  80 mls/hr TPN  CONT IV  Last administered on 5/1/20at 

01:00;  Start 4/30/20 at 22:00;  Stop 5/1/20 at 21:59;  Status DC


Magnesium Sulfate 50 ml @ 25 mls/hr 1X  ONCE IV  Last administered on 4/30/20at 

17:18;  Start 4/30/20 at 12:45;  Stop 4/30/20 at 14:44;  Status DC


Potassium Chloride/Water 100 ml @  100 mls/hr 1X  ONCE IV  Last administered on 

5/1/20at 11:27;  Start 5/1/20 at 12:00;  Stop 5/1/20 at 12:59;  Status DC


Hydromorphone HCl (Dilaudid Standard PCA) 12 mg STK-MED ONCE IV ;  Start 4/29/20

at 10:50;  Stop 5/1/20 at 11:02;  Status DC


Hydromorphone HCl (Dilaudid Standard PCA) 12 mg STK-MED ONCE IV ;  Start 4/30/20

at 13:47;  Stop 5/1/20 at 11:03;  Status DC


Potassium Acetate 30 meq/Magnesium Sulfate 20 meq/ Calcium Gluconate 10 meq/ 

Multivitamins 10 ml/Chromium/ Copper/Manganese/ Seleni/Zn 0.5 ml/ Insulin Human 

Regular 30 unit/ Potassium Chloride 30 meq/ Total Parenteral Nutrition/Amino 

Acids/Dextrose/ Fat Emulsion Intravenous 1,920 ml @  80 mls/hr TPN  CONT IV  

Last administered on 5/1/20at 22:34;  Start 5/1/20 at 22:00;  Stop 5/2/20 at 

21:59;  Status DC


Potassium Chloride/Water 100 ml @  100 mls/hr Q1H IV  Last administered on 

5/2/20at 13:05;  Start 5/2/20 at 07:00;  Stop 5/2/20 at 10:59;  Status DC


Magnesium Sulfate 50 ml @ 25 mls/hr 1X  ONCE IV  Last administered on 5/2/20at 

10:34;  Start 5/2/20 at 10:30;  Stop 5/2/20 at 12:29;  Status DC


Potassium Chloride 75 meq/ Magnesium Sulfate 20 meq/Calcium Gluconate 10 meq/ 

Multivitamins 10 ml/Chromium/ Copper/Manganese/ Seleni/Zn 0.5 ml/ Insulin Human 

Regular 30 unit/ Total Parenteral Nutrition/Amino Acids/Dextrose/ Fat Emulsion 

Intravenous 1,920 ml @  80 mls/hr TPN  CONT IV  Last administered on 5/2/20at 

21:51;  Start 5/2/20 at 22:00;  Stop 5/3/20 at 22:00;  Status DC


Potassium Chloride 75 meq/ Magnesium Sulfate 20 meq/Calcium Gluconate 10 meq/ 

Multivitamins 10 ml/Chromium/ Copper/Manganese/ Seleni/Zn 0.5 ml/ Insulin Human 

Regular 25 unit/ Total Parenteral Nutrition/Amino Acids/Dextrose/ Fat Emulsion 

Intravenous 1,920 ml @  80 mls/hr TPN  CONT IV  Last administered on 5/3/20at 

22:04;  Start 5/3/20 at 22:00;  Stop 5/4/20 at 21:59;  Status DC


Hydromorphone HCl (Dilaudid) 0.4 mg PRN Q4HRS  PRN IVP PAIN Last administered on

5/4/20at 10:57;  Start 5/4/20 at 09:00;  Stop 5/4/20 at 18:59;  Status DC


Micafungin Sodium 100 mg/Dextrose 100 ml @  100 mls/hr Q24H IV  Last 

administered on 5/17/20at 11:07;  Start 5/4/20 at 11:00


Daptomycin 485 mg/ Sodium Chloride 50 ml @  100 mls/hr Q24H IV  Last 

administered on 5/11/20at 13:10;  Start 5/4/20 at 11:00;  Stop 5/12/20 at 07:44;

 Status DC


Potassium Chloride 75 meq/ Magnesium Sulfate 15 meq/Calcium Gluconate 8 meq/ 

Multivitamins 10 ml/Chromium/ Copper/Manganese/ Seleni/Zn 0.5 ml/ Insulin Human 

Regular 25 unit/ Total Parenteral Nutrition/Amino Acids/Dextrose/ Fat Emulsion 

Intravenous 1,920 ml @  80 mls/hr TPN  CONT IV  Last administered on 5/4/20at 

23:08;  Start 5/4/20 at 22:00;  Stop 5/5/20 at 21:59;  Status DC


Haloperidol Lactate (Haldol Inj) 3 mg 1X  ONCE IVP  Last administered on 

5/4/20at 14:37;  Start 5/4/20 at 14:30;  Stop 5/4/20 at 14:31;  Status DC


Hydromorphone HCl (Dilaudid) 1 mg PRN Q4HRS  PRN IVP PAIN Last administered on 

5/17/20at 14:05;  Start 5/4/20 at 19:00


Potassium Chloride 75 meq/ Magnesium Sulfate 15 meq/Calcium Gluconate 8 meq/ 

Multivitamins 10 ml/Chromium/ Copper/Manganese/ Seleni/Zn 0.5 ml/ Insulin Human 

Regular 20 unit/ Total Parenteral Nutrition/Amino Acids/Dextrose/ Fat Emulsion 

Intravenous 1,920 ml @  80 mls/hr TPN  CONT IV  Last administered on 5/5/20at 

22:10;  Start 5/5/20 at 22:00;  Stop 5/6/20 at 21:59;  Status DC


Lidocaine HCl (Buffered Lidocaine 1%) 3 ml STK-MED ONCE .ROUTE ;  Start 5/6/20 

at 11:31;  Stop 5/6/20 at 11:31;  Status DC


Lidocaine HCl (Buffered Lidocaine 1%) 3 ml STK-MED ONCE .ROUTE ;  Start 5/6/20 

at 12:28;  Stop 5/6/20 at 12:29;  Status DC


Lidocaine HCl (Buffered Lidocaine 1%) 6 ml 1X  ONCE INJ  Last administered on 

5/6/20at 12:53;  Start 5/6/20 at 12:45;  Stop 5/6/20 at 12:46;  Status DC


Potassium Chloride 75 meq/ Magnesium Sulfate 15 meq/Calcium Gluconate 8 meq/ 

Multivitamins 10 ml/Chromium/ Copper/Manganese/ Seleni/Zn 0.5 ml/ Insulin Human 

Regular 20 unit/ Total Parenteral Nutrition/Amino Acids/Dextrose/ Fat Emulsion 

Intravenous 1,920 ml @  80 mls/hr TPN  CONT IV  Last administered on 5/6/20at 

22:00;  Start 5/6/20 at 22:00;  Stop 5/7/20 at 21:59;  Status DC


Potassium Chloride 75 meq/ Magnesium Sulfate 15 meq/Calcium Gluconate 8 meq/ 

Multivitamins 10 ml/Chromium/ Copper/Manganese/ Seleni/Zn 0.5 ml/ Insulin Human 

Regular 15 unit/ Total Parenteral Nutrition/Amino Acids/Dextrose/ Fat Emulsion 

Intravenous 1,920 ml @  80 mls/hr TPN  CONT IV  Last administered on 5/7/20at 

22:28;  Start 5/7/20 at 22:00;  Stop 5/8/20 at 21:59;  Status DC


Vecuronium Bromide (Norcuron Bolus) 6 mg PRN Q6HRS  PRN IV VENT ASYNCHRONY;  

Start 5/7/20 at 19:15;  Stop 5/7/20 at 19:35;  Status DC


Bumetanide (Bumex) 2 mg 1X  ONCE IV  Last administered on 5/7/20at 22:09;  Start

5/7/20 at 19:45;  Stop 5/7/20 at 19:46;  Status DC


Lidocaine HCl (Buffered Lidocaine 1%) 3 ml STK-MED ONCE .ROUTE ;  Start 5/8/20 

at 07:59;  Stop 5/8/20 at 07:59;  Status DC


Midazolam HCl (Versed) 5 mg STK-MED ONCE .ROUTE ;  Start 5/8/20 at 08:36;  Stop 

5/8/20 at 08:36;  Status DC


Fentanyl Citrate (Fentanyl 5ml Vial) 250 mcg STK-MED ONCE .ROUTE ;  Start 5/8/20

at 08:36;  Stop 5/8/20 at 08:37;  Status DC


Lidocaine HCl (Buffered Lidocaine 1%) 3 ml 1X  ONCE IJ  Last administered on 

5/8/20at 09:30;  Start 5/8/20 at 09:15;  Stop 5/8/20 at 09:16;  Status DC


Midazolam HCl (Versed) 5 mg 1X  ONCE IV  Last administered on 5/8/20at 09:30;  

Start 5/8/20 at 09:15;  Stop 5/8/20 at 09:16;  Status DC


Fentanyl Citrate (Fentanyl 5ml Vial) 250 mcg 1X  ONCE IV  Last administered on 

5/8/20at 09:30;  Start 5/8/20 at 09:15;  Stop 5/8/20 at 09:16;  Status DC


Bumetanide (Bumex) 2 mg DAILY IV  Last administered on 5/17/20at 09:09;  Start 

5/8/20 at 10:00


Potassium Chloride 75 meq/ Magnesium Sulfate 15 meq/ Multivitamins 10 

ml/Chromium/ Copper/Manganese/ Seleni/Zn 0.5 ml/ Insulin Human Regular 15 unit/ 

Total Parenteral Nutrition/Amino Acids/Dextrose/ Fat Emulsion Intravenous 1,920 

ml @  80 mls/hr TPN  CONT IV  Last administered on 5/8/20at 21:59;  Start 5/8/20

at 22:00;  Stop 5/9/20 at 21:59;  Status DC


Metoclopramide HCl (Reglan Vial) 10 mg PRN Q3HRS  PRN IVP NAUSEA/VOMITING-3rd 

choice Last administered on 5/14/20at 04:25;  Start 5/9/20 at 16:45


Potassium Chloride 75 meq/ Magnesium Sulfate 15 meq/ Multivitamins 10 

ml/Chromium/ Copper/Manganese/ Seleni/Zn 0.5 ml/ Insulin Human Regular 15 unit/ 

Total Parenteral Nutrition/Amino Acids/Dextrose/ Fat Emulsion Intravenous 1,920 

ml @  80 mls/hr TPN  CONT IV  Last administered on 5/9/20at 22:41;  Start 5/9/20

at 22:00;  Stop 5/10/20 at 21:59;  Status DC


Magnesium Sulfate 50 ml @ 25 mls/hr 1X  ONCE IV  Last administered on 5/10/20at 

10:44;  Start 5/10/20 at 09:00;  Stop 5/10/20 at 10:59;  Status DC


Potassium Chloride/Water 100 ml @  100 mls/hr 1X  ONCE IV  Last administered on 

5/10/20at 09:37;  Start 5/10/20 at 09:00;  Stop 5/10/20 at 09:59;  Status DC


Duloxetine HCl (Cymbalta) 30 mg DAILY PO  Last administered on 5/11/20at 09:48; 

Start 5/10/20 at 14:00;  Stop 5/13/20 at 10:25;  Status DC


Potassium Chloride 80 meq/ Magnesium Sulfate 20 meq/ Multivitamins 10 

ml/Chromium/ Copper/Manganese/ Seleni/Zn 0.5 ml/ Insulin Human Regular 15 unit/ 

Total Parenteral Nutrition/Amino Acids/Dextrose/ Fat Emulsion Intravenous 1,920 

ml @  80 mls/hr TPN  CONT IV  Last administered on 5/10/20at 21:42;  Start 

5/10/20 at 22:00;  Stop 5/11/20 at 21:59;  Status DC


Potassium Chloride 80 meq/ Magnesium Sulfate 20 meq/ Multivitamins 10 

ml/Chromium/ Copper/Manganese/ Seleni/Zn 0.5 ml/ Insulin Human Regular 15 unit/ 

Total Parenteral Nutrition/Amino Acids/Dextrose/ Fat Emulsion Intravenous 1,920 

ml @  80 mls/hr TPN  CONT IV  Last administered on 5/11/20at 22:20;  Start 

5/11/20 at 22:00;  Stop 5/12/20 at 21:59;  Status DC


Lidocaine HCl (Buffered Lidocaine 1%) 3 ml STK-MED ONCE .ROUTE ;  Start 5/12/20 

at 09:54;  Stop 5/12/20 at 09:55;  Status DC


Hydromorphone HCl (Dilaudid Standard PCA) 12 mg STK-MED ONCE IV ;  Start 5/1/20 

at 15:50;  Stop 5/12/20 at 11:24;  Status DC


Potassium Chloride 80 meq/ Magnesium Sulfate 20 meq/ Multivitamins 10 

ml/Chromium/ Copper/Manganese/ Seleni/Zn 0.5 ml/ Insulin Human Regular 15 unit/ 

Total Parenteral Nutrition/Amino Acids/Dextrose/ Fat Emulsion Intravenous 1,920 

ml @  80 mls/hr TPN  CONT IV  Last administered on 5/12/20at 21:40;  Start 

5/12/20 at 22:00;  Stop 5/13/20 at 21:59;  Status DC


Lidocaine HCl (Buffered Lidocaine 1%) 6 ml 1X  ONCE INJ  Last administered on 

5/12/20at 14:15;  Start 5/12/20 at 14:15;  Stop 5/12/20 at 14:16;  Status DC


Potassium Chloride 80 meq/ Magnesium Sulfate 20 meq/ Multivitamins 10 

ml/Chromium/ Copper/Manganese/ Seleni/Zn 1 ml/ Insulin Human Regular 15 unit/ 

Total Parenteral Nutrition/Amino Acids/Dextrose/ Fat Emulsion Intravenous 1,920 

ml @  80 mls/hr TPN  CONT IV  Last administered on 5/13/20at 22:04;  Start 

5/13/20 at 22:00;  Stop 5/14/20 at 21:59;  Status DC


Potassium Chloride/Water 100 ml @  100 mls/hr 1X  ONCE IV  Last administered on 

5/14/20at 11:34;  Start 5/14/20 at 11:00;  Stop 5/14/20 at 11:59;  Status DC


Potassium Chloride 90 meq/ Magnesium Sulfate 20 meq/ Multivitamins 10 

ml/Chromium/ Copper/Manganese/ Seleni/Zn 1 ml/ Insulin Human Regular 15 unit/ 

Total Parenteral Nutrition/Amino Acids/Dextrose/ Fat Emulsion Intravenous 1,920 

ml @  80 mls/hr TPN  CONT IV  Last administered on 5/14/20at 22:57;  Start 

5/14/20 at 22:00;  Stop 5/15/20 at 21:59;  Status DC


Potassium Chloride 90 meq/ Magnesium Sulfate 20 meq/ Multivitamins 10 

ml/Chromium/ Copper/Manganese/ Seleni/Zn 1 ml/ Insulin Human Regular 15 unit/ 

Total Parenteral Nutrition/Amino Acids/Dextrose/ Fat Emulsion Intravenous 1,920 

ml @  80 mls/hr TPN  CONT IV  Last administered on 5/15/20at 22:48;  Start 

5/15/20 at 22:00;  Stop 5/16/20 at 21:59;  Status DC


Potassium Chloride 90 meq/ Magnesium Sulfate 20 meq/ Multivitamins 10 

ml/Chromium/ Copper/Manganese/ Seleni/Zn 1 ml/ Insulin Human Regular 15 unit/ 

Total Parenteral Nutrition/Amino Acids/Dextrose/ Fat Emulsion Intravenous 1,890 

ml @  78.75 mls/ hr TPN  CONT IV  Last administered on 5/16/20at 22:15;  Start 

5/16/20 at 22:00;  Stop 5/17/20 at 21:59


Linezolid/Dextrose 300 ml @  300 mls/hr Q12HR IV  Last administered on 5/17/20at

09:11;  Start 5/17/20 at 09:00


Daptomycin 450 mg/ Sodium Chloride 50 ml @  100 mls/hr Q24H IV  Last 

administered on 5/17/20at 10:28;  Start 5/17/20 at 09:00


Potassium Chloride 90 meq/ Magnesium Sulfate 20 meq/ Multivitamins 10 

ml/Chromium/ Copper/Manganese/ Seleni/Zn 1 ml/ Insulin Human Regular 15 unit/ To

chely Parenteral Nutrition/Amino Acids/Dextrose/ Fat Emulsion Intravenous 1,890 ml

@  78.75 mls/ hr TPN  CONT IV ;  Start 5/17/20 at 22:00;  Stop 5/18/20 at 21:59


Lorazepam (Ativan Inj) 2 mg STK-MED ONCE .ROUTE ;  Start 5/17/20 at 14:58;  Stop

5/17/20 at 14:58;  Status DC


Metoprolol Tartrate (Lopressor Vial) 5 mg 1X  ONCE IVP  Last administered on 

5/17/20at 15:31;  Start 5/17/20 at 15:15;  Stop 5/17/20 at 15:16;  Status DC


Lorazepam (Ativan Inj) 2 mg 1X  ONCE IVP  Last administered on 5/17/20at 15:30; 

Start 5/17/20 at 15:15;  Stop 5/17/20 at 15:16;  Status DC





Active Scripts


Active


Reported


Bisoprolol Fumarate 5 Mg Tablet 10 Mg PO DAILY


Vitals/I & O





Vital Sign - Last 24 Hours








 5/16/20 5/16/20 5/16/20 5/16/20





 17:00 17:18 17:52 18:00


 


Temp 98.8   





 98.8   


 


Pulse 103   86


 


Resp 22 27 22 20


 


B/P (MAP) 167/98 (121)   113/62 (79)


 


Pulse Ox 96 100 99 96


 


O2 Delivery Nasal Cannula Nasal Cannula Nasal Cannula Nasal Cannula


 


O2 Flow Rate 2.0 2.0 2.0 2.0


 


    





    





 5/16/20 5/16/20 5/16/20 5/16/20





 19:00 20:00 20:00 21:00


 


Temp   98.3 





   98.3 


 


Pulse 80  76 136


 


Resp 18  18 33


 


B/P (MAP) 87/45 (59)  79/48 (58) 195/77 (116)


 


Pulse Ox 97  98 88


 


O2 Delivery Nasal Cannula Nasal Cannula Nasal Cannula Nasal Cannula


 


O2 Flow Rate 2.0 2.0 2.0 2.0


 


    





    





 5/16/20 5/16/20 5/16/20 5/16/20





 21:46 22:00 22:16 23:00


 


Pulse  122  98


 


Resp 33 20 17 20


 


B/P (MAP)  113/59 (77)  98/55 (69)


 


Pulse Ox 87 99 100 100


 


O2 Delivery Nasal Cannula Nasal Cannula Nasal Cannula Nasal Cannula


 


O2 Flow Rate 2.0 2.0 2.0 2.0





 5/17/20 5/17/20 5/17/20 5/17/20





 00:00 00:00 01:00 02:00


 


Temp 97.7   





 97.7   


 


Pulse 100  84 90


 


Resp 22  17 22


 


B/P (MAP) 97/57 (70)  103/62 (76) 101/56 (71)


 


Pulse Ox 100  100 100


 


O2 Delivery Nasal Cannula Nasal Cannula Nasal Cannula Nasal Cannula


 


O2 Flow Rate 2.0 2.0 2.0 2.0


 


    





    





 5/17/20 5/17/20 5/17/20 5/17/20





 02:11 03:00 04:00 04:00


 


Temp   98.1 





   98.1 


 


Pulse  100 104 


 


Resp 16 15 23 


 


B/P (MAP)  113/64 (80) 121/67 (85) 


 


Pulse Ox 100 100 100 


 


O2 Delivery Nasal Cannula Nasal Cannula Nasal Cannula Nasal Cannula


 


O2 Flow Rate 2.0 2.0 2.0 2.0


 


    





    





 5/17/20 5/17/20 5/17/20 5/17/20





 05:00 06:00 07:00 07:57


 


Pulse 96 104 100 


 


Resp 13 21 28 35


 


B/P (MAP) 112/65 (81) 113/73 (86) 167/87 (113) 


 


Pulse Ox 100 100 100 92


 


O2 Delivery Nasal Cannula Nasal Cannula Nasal Cannula Nasal Cannula


 


O2 Flow Rate 2.0 2.0 2.0 2.0





 5/17/20 5/17/20 5/17/20 5/17/20





 08:00 08:00 09:00 09:09


 


Temp 98.4   





 98.4   


 


Pulse 126  108 


 


Resp 32  24 14


 


B/P (MAP) 146/69 (94)  109/49 (69) 


 


Pulse Ox 100  100 100


 


O2 Delivery Nasal Cannula Nasal Cannula Nasal Cannula Nasal Cannula


 


O2 Flow Rate 2.0 2.0 2.0 2.0


 


    





    





 5/17/20 5/17/20 5/17/20 5/17/20





 10:00 11:00 12:00 12:00


 


Temp    98.6





    98.6


 


Pulse 120 119  102


 


Resp 20 17  20


 


B/P (MAP) 119/66 (83) 169/73 (105)  181/92 (121)


 


Pulse Ox 100 100  98


 


O2 Delivery Nasal Cannula Nasal Cannula Nasal Cannula Nasal Cannula


 


O2 Flow Rate 2.0 2.0 2.0 2.0


 


    





    





 5/17/20 5/17/20 5/17/20 5/17/20





 13:00 14:00 14:05 14:54


 


Pulse 111 120  


 


Resp 36 30 38 29


 


B/P (MAP) 140/65 (90) 204/79 (120)  


 


Pulse Ox 94 94 98 100


 


O2 Delivery Nasal Cannula Nasal Cannula Nasal Cannula Nasal Cannula


 


O2 Flow Rate 2.0 4.0 2.0 2.0





 5/17/20 5/17/20 5/17/20 





 15:00 15:31 15:57 


 


Pulse 148 145  


 


Resp 36   


 


B/P (MAP) 152/70 (97) 124/65  


 


Pulse Ox 99   


 


O2 Delivery Nasal Cannula  Nasal Cannula 


 


O2 Flow Rate 4.0  2.0 














Intake and Output   


 


 5/16/20 5/16/20 5/17/20





 15:00 23:00 07:00


 


Intake Total 260 ml 1579.28 ml 1475 ml


 


Output Total 2000 ml 1025 ml 900 ml


 


Balance -1740 ml 554.28 ml 575 ml











Hemodynamically unstable?:  No


Is patient in severe pain?:  No


Is NPO status required?:  Yes











GRAEME MASSEY MD             May 17, 2020 16:09

## 2020-05-18 VITALS — SYSTOLIC BLOOD PRESSURE: 170 MMHG | DIASTOLIC BLOOD PRESSURE: 92 MMHG

## 2020-05-18 VITALS — DIASTOLIC BLOOD PRESSURE: 60 MMHG | SYSTOLIC BLOOD PRESSURE: 119 MMHG

## 2020-05-18 VITALS — DIASTOLIC BLOOD PRESSURE: 53 MMHG | SYSTOLIC BLOOD PRESSURE: 93 MMHG

## 2020-05-18 VITALS — DIASTOLIC BLOOD PRESSURE: 85 MMHG | SYSTOLIC BLOOD PRESSURE: 166 MMHG

## 2020-05-18 VITALS — DIASTOLIC BLOOD PRESSURE: 92 MMHG | SYSTOLIC BLOOD PRESSURE: 148 MMHG

## 2020-05-18 VITALS — DIASTOLIC BLOOD PRESSURE: 70 MMHG | SYSTOLIC BLOOD PRESSURE: 113 MMHG

## 2020-05-18 VITALS — SYSTOLIC BLOOD PRESSURE: 158 MMHG | DIASTOLIC BLOOD PRESSURE: 78 MMHG

## 2020-05-18 VITALS — DIASTOLIC BLOOD PRESSURE: 103 MMHG | SYSTOLIC BLOOD PRESSURE: 150 MMHG

## 2020-05-18 VITALS — SYSTOLIC BLOOD PRESSURE: 97 MMHG | DIASTOLIC BLOOD PRESSURE: 55 MMHG

## 2020-05-18 VITALS — SYSTOLIC BLOOD PRESSURE: 93 MMHG | DIASTOLIC BLOOD PRESSURE: 54 MMHG

## 2020-05-18 VITALS — DIASTOLIC BLOOD PRESSURE: 60 MMHG | SYSTOLIC BLOOD PRESSURE: 139 MMHG

## 2020-05-18 VITALS — DIASTOLIC BLOOD PRESSURE: 90 MMHG | SYSTOLIC BLOOD PRESSURE: 156 MMHG

## 2020-05-18 VITALS — DIASTOLIC BLOOD PRESSURE: 80 MMHG | SYSTOLIC BLOOD PRESSURE: 188 MMHG

## 2020-05-18 VITALS — DIASTOLIC BLOOD PRESSURE: 88 MMHG | SYSTOLIC BLOOD PRESSURE: 175 MMHG

## 2020-05-18 VITALS — DIASTOLIC BLOOD PRESSURE: 81 MMHG | SYSTOLIC BLOOD PRESSURE: 168 MMHG

## 2020-05-18 VITALS — SYSTOLIC BLOOD PRESSURE: 84 MMHG | DIASTOLIC BLOOD PRESSURE: 56 MMHG

## 2020-05-18 VITALS — SYSTOLIC BLOOD PRESSURE: 168 MMHG | DIASTOLIC BLOOD PRESSURE: 86 MMHG

## 2020-05-18 VITALS — SYSTOLIC BLOOD PRESSURE: 95 MMHG | DIASTOLIC BLOOD PRESSURE: 48 MMHG

## 2020-05-18 VITALS — SYSTOLIC BLOOD PRESSURE: 168 MMHG | DIASTOLIC BLOOD PRESSURE: 89 MMHG

## 2020-05-18 VITALS — DIASTOLIC BLOOD PRESSURE: 62 MMHG | SYSTOLIC BLOOD PRESSURE: 156 MMHG

## 2020-05-18 VITALS — SYSTOLIC BLOOD PRESSURE: 154 MMHG | DIASTOLIC BLOOD PRESSURE: 78 MMHG

## 2020-05-18 LAB
ANION GAP SERPL CALC-SCNC: 0 MMOL/L (ref 6–14)
BASOPHILS # BLD AUTO: 0 X10^3/UL (ref 0–0.2)
BASOPHILS NFR BLD: 0 % (ref 0–3)
BUN SERPL-MCNC: 26 MG/DL (ref 7–20)
CALCIUM SERPL-MCNC: 9.3 MG/DL (ref 8.5–10.1)
CHLORIDE SERPL-SCNC: 99 MMOL/L (ref 98–107)
CO2 SERPL-SCNC: 42 MMOL/L (ref 21–32)
CREAT SERPL-MCNC: 0.6 MG/DL (ref 0.6–1)
EOSINOPHIL NFR BLD: 0.1 X10^3/UL (ref 0–0.7)
EOSINOPHIL NFR BLD: 1 % (ref 0–3)
ERYTHROCYTE [DISTWIDTH] IN BLOOD BY AUTOMATED COUNT: 18.1 % (ref 11.5–14.5)
GFR SERPLBLD BASED ON 1.73 SQ M-ARVRAT: 106.3 ML/MIN
GLUCOSE SERPL-MCNC: 164 MG/DL (ref 70–99)
HCT VFR BLD CALC: 24.9 % (ref 36–47)
HGB BLD-MCNC: 7.9 G/DL (ref 12–15.5)
LYMPHOCYTES # BLD: 2.9 X10^3/UL (ref 1–4.8)
LYMPHOCYTES NFR BLD AUTO: 21 % (ref 24–48)
MAGNESIUM SERPL-MCNC: 2 MG/DL (ref 1.8–2.4)
MCH RBC QN AUTO: 28 PG (ref 25–35)
MCHC RBC AUTO-ENTMCNC: 32 G/DL (ref 31–37)
MCV RBC AUTO: 89 FL (ref 79–100)
MONO #: 0.8 X10^3/UL (ref 0–1.1)
MONOCYTES NFR BLD: 6 % (ref 0–9)
NEUT #: 10 X10^3/UL (ref 1.8–7.7)
NEUTROPHILS NFR BLD AUTO: 72 % (ref 31–73)
PHOSPHATE SERPL-MCNC: 4.3 MG/DL (ref 2.6–4.7)
PLATELET # BLD AUTO: 486 X10^3/UL (ref 140–400)
POTASSIUM SERPL-SCNC: 4.8 MMOL/L (ref 3.5–5.1)
RBC # BLD AUTO: 2.81 X10^6/UL (ref 3.5–5.4)
SODIUM SERPL-SCNC: 141 MMOL/L (ref 136–145)
TRIGL SERPL-MCNC: 221 MG/DL (ref 0–150)
WBC # BLD AUTO: 13.8 X10^3/UL (ref 4–11)

## 2020-05-18 RX ADMIN — PANTOPRAZOLE SODIUM SCH MG: 40 INJECTION, POWDER, FOR SOLUTION INTRAVENOUS at 08:07

## 2020-05-18 RX ADMIN — FENTANYL CITRATE PRN MCG: 50 INJECTION INTRAMUSCULAR; INTRAVENOUS at 21:00

## 2020-05-18 RX ADMIN — DAPTOMYCIN SCH MLS/HR: 500 INJECTION, POWDER, LYOPHILIZED, FOR SOLUTION INTRAVENOUS at 08:08

## 2020-05-18 RX ADMIN — ENOXAPARIN SODIUM SCH MG: 40 INJECTION SUBCUTANEOUS at 17:05

## 2020-05-18 RX ADMIN — DEXMEDETOMIDINE HYDROCHLORIDE PRN MLS/HR: 100 INJECTION, SOLUTION, CONCENTRATE INTRAVENOUS at 11:40

## 2020-05-18 RX ADMIN — DEXMEDETOMIDINE HYDROCHLORIDE PRN MLS/HR: 100 INJECTION, SOLUTION, CONCENTRATE INTRAVENOUS at 22:21

## 2020-05-18 RX ADMIN — FENTANYL CITRATE PRN MCG: 50 INJECTION INTRAMUSCULAR; INTRAVENOUS at 17:13

## 2020-05-18 RX ADMIN — MEROPENEM SCH MLS/HR: 1 INJECTION, POWDER, FOR SOLUTION INTRAVENOUS at 13:57

## 2020-05-18 RX ADMIN — BACITRACIN SCH MLS/HR: 5000 INJECTION, POWDER, FOR SOLUTION INTRAMUSCULAR at 11:41

## 2020-05-18 RX ADMIN — MEROPENEM SCH MLS/HR: 1 INJECTION, POWDER, FOR SOLUTION INTRAVENOUS at 22:17

## 2020-05-18 RX ADMIN — BUMETANIDE SCH MG: 0.25 INJECTION INTRAMUSCULAR; INTRAVENOUS at 08:07

## 2020-05-18 RX ADMIN — MEROPENEM SCH MLS/HR: 1 INJECTION, POWDER, FOR SOLUTION INTRAVENOUS at 05:51

## 2020-05-18 RX ADMIN — INSULIN LISPRO SCH UNITS: 100 INJECTION, SOLUTION INTRAVENOUS; SUBCUTANEOUS at 17:05

## 2020-05-18 RX ADMIN — INSULIN LISPRO SCH UNITS: 100 INJECTION, SOLUTION INTRAVENOUS; SUBCUTANEOUS at 11:09

## 2020-05-18 RX ADMIN — DEXMEDETOMIDINE HYDROCHLORIDE PRN MLS/HR: 100 INJECTION, SOLUTION, CONCENTRATE INTRAVENOUS at 05:51

## 2020-05-18 RX ADMIN — HYDROMORPHONE HYDROCHLORIDE PRN MG: 2 INJECTION INTRAMUSCULAR; INTRAVENOUS; SUBCUTANEOUS at 06:25

## 2020-05-18 RX ADMIN — INSULIN LISPRO SCH UNITS: 100 INJECTION, SOLUTION INTRAVENOUS; SUBCUTANEOUS at 06:00

## 2020-05-18 RX ADMIN — DEXTRAN 70, GLYCERIN, HYPROMELLOSE PRN DROP: 1; 2; 3 SOLUTION/ DROPS OPHTHALMIC at 08:08

## 2020-05-18 RX ADMIN — Medication PRN EACH: at 14:11

## 2020-05-18 RX ADMIN — DEXTROSE SCH MLS/HR: 50 INJECTION, SOLUTION INTRAVENOUS at 11:07

## 2020-05-18 RX ADMIN — DEXMEDETOMIDINE HYDROCHLORIDE PRN MLS/HR: 100 INJECTION, SOLUTION, CONCENTRATE INTRAVENOUS at 18:05

## 2020-05-18 NOTE — NUR
Pharmacy TPN Dosing Note



S: JESENIA RICH is a 49 year old F Currently receiving Central Continuous TPN started 
03/18/20



B:Pertinent PMH: 

Necrotizing pancreatitis

Height: 5 feet, 8 inches

Weight: 90.203766 kg



Current diet: NPO 



LABS:

Sodium:    141 

Potassium: 4.8 

Chloride:  99 

Calcium:   9.2 

Corrected Calcium: 10.64 

Magnesium: 2.0 

CO2:       38 

SCr:       0.6 

Glucose:   164 

Albumin:   2.2 

AST:       50 

ALT:       50 



TPN FORMULA:

TPN TYPE:  Central Continuous

AMINO ACIDS:         90 gm

DEXTROSE:            250 gm

LIPIDS:              30 gm

SODIUM CHLORIDE:     - mEq

SODIUM ACETATE:      - mEq

SODIUM PHOSPHATE:    - mmol

POTASSIUM CHLORIDE:  90 mEq

POTASSIUM ACETATE:   - mEq

POTASSIUM PHOSPHATE: - mmol

MAGNESIUM:           20 mEq

CALCIUM:             - mEq

INSULIN:             15 units

MULTIPLE VITAMIN:    10 ml

TRACE ELEMENTS:      1 ml(s)



TPN PLAN:  

decrease NACL TO 50 MEQ/decrease KCL TO 25 MEQ





R: Continue TPN 

Will monitor electrolytes, glucose, and tolerance to TPN.



 YENIFER STREETER [facility], 05/18/20 [id number]

## 2020-05-18 NOTE — PDOC
PULMONARY PROGRESS NOTES


Subjective


Patient intubated on 3/23 , s/p trach 4/6, awake alert follows commands, is 

weak, small trach secretion, trach has been capped over 48 hrs


placed on cap trach, at times patient having some respiratory distress


s/p left tap 5/12 , 3 litres removed


Vitals





Vital Signs








  Date Time  Temp Pulse Resp B/P (MAP) Pulse Ox O2 Delivery O2 Flow Rate FiO2


 


5/18/20 16:00  127 36  99 Nasal Cannula 5.0 


 


5/18/20 15:00 97.9   154/78 (103)    





 97.9       








ROS:  No Chest Pain, No Abdominal Pain, No Increase Cough


General:  Alert, No acute distress


HEENT:  Other (nc at perrl     neck trach site ok  no lad  no thyromegaly)


Lungs:  Wheezing, Other (decrease bs)


Cardiovascular:  S1, S2


Abdomen:  Soft, Non-tender, Other (distended)


Neuro Exam:  Alert


Extremities:  Other (+3 generalized edema )


Skin:  Warm, Dry


Labs





Laboratory Tests








Test


 5/16/20


17:46 5/17/20


00:41 5/17/20


07:00 5/17/20


12:00


 


Glucose (Fingerstick)


 144 mg/dL


(70-99) 177 mg/dL


(70-99) 


 225 mg/dL


(70-99)


 


White Blood Count


 


 


 14.2 x10^3/uL


(4.0-11.0) 





 


Red Blood Count


 


 


 2.87 x10^6/uL


(3.50-5.40) 





 


Hemoglobin


 


 


 8.3 g/dL


(12.0-15.5) 





 


Hematocrit


 


 


 25.6 %


(36.0-47.0) 





 


Mean Corpuscular Volume   89 fL ()  


 


Mean Corpuscular Hemoglobin   29 pg (25-35)  


 


Mean Corpuscular Hemoglobin


Concent 


 


 32 g/dL


(31-37) 





 


Red Cell Distribution Width


 


 


 18.1 %


(11.5-14.5) 





 


Platelet Count


 


 


 497 x10^3/uL


(140-400) 





 


Neutrophils (%) (Auto)   68 % (31-73)  


 


Lymphocytes (%) (Auto)   25 % (24-48)  


 


Monocytes (%) (Auto)   6 % (0-9)  


 


Eosinophils (%) (Auto)   1 % (0-3)  


 


Basophils (%) (Auto)   0 % (0-3)  


 


Neutrophils # (Auto)


 


 


 9.6 x10^3/uL


(1.8-7.7) 





 


Lymphocytes # (Auto)


 


 


 3.5 x10^3/uL


(1.0-4.8) 





 


Monocytes # (Auto)


 


 


 0.9 x10^3/uL


(0.0-1.1) 





 


Eosinophils # (Auto)


 


 


 0.2 x10^3/uL


(0.0-0.7) 





 


Basophils # (Auto)


 


 


 0.0 x10^3/uL


(0.0-0.2) 





 


Sodium Level


 


 


 140 mmol/L


(136-145) 





 


Potassium Level


 


 


 4.5 mmol/L


(3.5-5.1) 





 


Chloride Level


 


 


 99 mmol/L


() 





 


Carbon Dioxide Level


 


 


 40 mmol/L


(21-32) 





 


Anion Gap   1 (6-14)  


 


Blood Urea Nitrogen


 


 


 26 mg/dL


(7-20) 





 


Creatinine


 


 


 0.5 mg/dL


(0.6-1.0) 





 


Estimated GFR


(Cockcroft-Gault) 


 


 131.1 


 





 


BUN/Creatinine Ratio   52 (6-20)  


 


Glucose Level


 


 


 127 mg/dL


(70-99) 





 


Calcium Level


 


 


 9.2 mg/dL


(8.5-10.1) 





 


Total Bilirubin


 


 


 0.4 mg/dL


(0.2-1.0) 





 


Aspartate Amino Transf


(AST/SGOT) 


 


 29 U/L (15-37) 


 





 


Alanine Aminotransferase


(ALT/SGPT) 


 


 27 U/L (14-59) 


 





 


Alkaline Phosphatase


 


 


 109 U/L


() 





 


Total Protein


 


 


 6.7 g/dL


(6.4-8.2) 





 


Albumin


 


 


 2.2 g/dL


(3.4-5.0) 





 


Albumin/Globulin Ratio   0.5 (1.0-1.7)  


 


Test


 5/17/20


17:46 5/17/20


23:47 5/18/20


05:56 5/18/20


06:00


 


Glucose (Fingerstick)


 145 mg/dL


(70-99) 240 mg/dL


(70-99) 162 mg/dL


(70-99) 





 


White Blood Count


 


 


 


 13.8 x10^3/uL


(4.0-11.0)


 


Red Blood Count


 


 


 


 2.81 x10^6/uL


(3.50-5.40)


 


Hemoglobin


 


 


 


 7.9 g/dL


(12.0-15.5)


 


Hematocrit


 


 


 


 24.9 %


(36.0-47.0)


 


Mean Corpuscular Volume    89 fL () 


 


Mean Corpuscular Hemoglobin    28 pg (25-35) 


 


Mean Corpuscular Hemoglobin


Concent 


 


 


 32 g/dL


(31-37)


 


Red Cell Distribution Width


 


 


 


 18.1 %


(11.5-14.5)


 


Platelet Count


 


 


 


 486 x10^3/uL


(140-400)


 


Neutrophils (%) (Auto)    72 % (31-73) 


 


Lymphocytes (%) (Auto)    21 % (24-48) 


 


Monocytes (%) (Auto)    6 % (0-9) 


 


Eosinophils (%) (Auto)    1 % (0-3) 


 


Basophils (%) (Auto)    0 % (0-3) 


 


Neutrophils # (Auto)


 


 


 


 10.0 x10^3/uL


(1.8-7.7)


 


Lymphocytes # (Auto)


 


 


 


 2.9 x10^3/uL


(1.0-4.8)


 


Monocytes # (Auto)


 


 


 


 0.8 x10^3/uL


(0.0-1.1)


 


Eosinophils # (Auto)


 


 


 


 0.1 x10^3/uL


(0.0-0.7)


 


Basophils # (Auto)


 


 


 


 0.0 x10^3/uL


(0.0-0.2)


 


Sodium Level


 


 


 


 141 mmol/L


(136-145)


 


Potassium Level


 


 


 


 4.8 mmol/L


(3.5-5.1)


 


Chloride Level


 


 


 


 99 mmol/L


()


 


Carbon Dioxide Level


 


 


 


 42 mmol/L


(21-32)


 


Anion Gap    0 (6-14) 


 


Blood Urea Nitrogen


 


 


 


 26 mg/dL


(7-20)


 


Creatinine


 


 


 


 0.6 mg/dL


(0.6-1.0)


 


Estimated GFR


(Cockcroft-Gault) 


 


 


 106.3 





 


Glucose Level


 


 


 


 164 mg/dL


(70-99)


 


Calcium Level


 


 


 


 9.3 mg/dL


(8.5-10.1)


 


Phosphorus Level


 


 


 


 4.3 mg/dL


(2.6-4.7)


 


Magnesium Level


 


 


 


 2.0 mg/dL


(1.8-2.4)


 


Triglycerides Level


 


 


 


 221 mg/dL


(0-150)


 


Test


 5/18/20


11:05 


 


 





 


Glucose (Fingerstick)


 236 mg/dL


(70-99) 


 


 











Laboratory Tests








Test


 5/17/20


17:46 5/17/20


23:47 5/18/20


05:56 5/18/20


06:00


 


Glucose (Fingerstick)


 145 mg/dL


(70-99) 240 mg/dL


(70-99) 162 mg/dL


(70-99) 





 


White Blood Count


 


 


 


 13.8 x10^3/uL


(4.0-11.0)


 


Red Blood Count


 


 


 


 2.81 x10^6/uL


(3.50-5.40)


 


Hemoglobin


 


 


 


 7.9 g/dL


(12.0-15.5)


 


Hematocrit


 


 


 


 24.9 %


(36.0-47.0)


 


Mean Corpuscular Volume    89 fL () 


 


Mean Corpuscular Hemoglobin    28 pg (25-35) 


 


Mean Corpuscular Hemoglobin


Concent 


 


 


 32 g/dL


(31-37)


 


Red Cell Distribution Width


 


 


 


 18.1 %


(11.5-14.5)


 


Platelet Count


 


 


 


 486 x10^3/uL


(140-400)


 


Neutrophils (%) (Auto)    72 % (31-73) 


 


Lymphocytes (%) (Auto)    21 % (24-48) 


 


Monocytes (%) (Auto)    6 % (0-9) 


 


Eosinophils (%) (Auto)    1 % (0-3) 


 


Basophils (%) (Auto)    0 % (0-3) 


 


Neutrophils # (Auto)


 


 


 


 10.0 x10^3/uL


(1.8-7.7)


 


Lymphocytes # (Auto)


 


 


 


 2.9 x10^3/uL


(1.0-4.8)


 


Monocytes # (Auto)


 


 


 


 0.8 x10^3/uL


(0.0-1.1)


 


Eosinophils # (Auto)


 


 


 


 0.1 x10^3/uL


(0.0-0.7)


 


Basophils # (Auto)


 


 


 


 0.0 x10^3/uL


(0.0-0.2)


 


Sodium Level


 


 


 


 141 mmol/L


(136-145)


 


Potassium Level


 


 


 


 4.8 mmol/L


(3.5-5.1)


 


Chloride Level


 


 


 


 99 mmol/L


()


 


Carbon Dioxide Level


 


 


 


 42 mmol/L


(21-32)


 


Anion Gap    0 (6-14) 


 


Blood Urea Nitrogen


 


 


 


 26 mg/dL


(7-20)


 


Creatinine


 


 


 


 0.6 mg/dL


(0.6-1.0)


 


Estimated GFR


(Cockcroft-Gault) 


 


 


 106.3 





 


Glucose Level


 


 


 


 164 mg/dL


(70-99)


 


Calcium Level


 


 


 


 9.3 mg/dL


(8.5-10.1)


 


Phosphorus Level


 


 


 


 4.3 mg/dL


(2.6-4.7)


 


Magnesium Level


 


 


 


 2.0 mg/dL


(1.8-2.4)


 


Triglycerides Level


 


 


 


 221 mg/dL


(0-150)


 


Test


 5/18/20


11:05 


 


 





 


Glucose (Fingerstick)


 236 mg/dL


(70-99) 


 


 











Medications





Active Scripts








 Medications  Dose


 Route/Sig


 Max Daily Dose Days Date Category


 


 Bisoprolol


 Fumarate 5 Mg


 Tablet  10 Mg


 PO DAILY


   3/16/20 Reported








Comments


CXR  reviewed.





Impression


.


IMPRESSION:


1.  Acute hypoxemic respiratory failure secondary to ARDS status post trach,


2.  Gallstone pancreatitis


3.  Severe metabolic acidosis.stable


4.  Acute kidney injury-stable, Off HD-- continue to improve 


5.  Acute gallstone pancreatitis.


6.  Hypoalbuminemia.


7.  Moderate persistent effusions, s/p left thora  5/12


8.  Fever-  Per ID, per surgery--resolved 


9.  Chronic anemia


10. Covid 19 testing negative


11. Moderate to large ascites-S/P paracentisis


12.S/P paracentisis with 4 liters removed on 4/15/20


13. S/P IR drain placement on 5/8/2020





Plan


.


Appears to be dry, hold Bumex


s/p  thoracentesis, 5/12, 3 litres removed


cap trach as tolerated


Follow surgery recs-- S/P 3 drain placed in IR on 5/8/2020


Follow ID recs for [id number]


Follow nephrology recs 


Continue TPN 


DVT/GI PPX: heparin SQ/ protonix 


D/W RN and RT, pt





CODE:FULL











STEVE MIRANDA MD              May 18, 2020 17:14

## 2020-05-18 NOTE — PDOC
SURGICAL PROGRESS NOTE


Subjective


Tom for Dr Flores





awake, anxious, trying to talk,having problems enunciating


Vital Signs





Vital Signs








  Date Time  Temp Pulse Resp B/P (MAP) Pulse Ox O2 Delivery O2 Flow Rate FiO2


 


5/18/20 10:57  150 35 156/62 (93) 95 Nasal Cannula 3.0 


 


5/18/20 07:00 98.4       





 98.4       








I&O











Intake and Output 


 


 5/18/20





 07:00


 


Intake Total 3454.23 ml


 


Output Total 4335 ml


 


Balance -880.77 ml


 


 


 


IV Total 3454.23 ml


 


Output Urine Total 3855 ml


 


Drainage Total 480 ml








PATIENT HAS A VILLASENOR:  Yes (accurate I and O )


General:  Alert


Abdomen:  Soft, Other (one drain in place)


Labs





Laboratory Tests








Test


 5/16/20


17:46 5/17/20


00:41 5/17/20


07:00 5/17/20


12:00


 


Glucose (Fingerstick)


 144 mg/dL


(70-99) 177 mg/dL


(70-99) 


 225 mg/dL


(70-99)


 


White Blood Count


 


 


 14.2 x10^3/uL


(4.0-11.0) 





 


Red Blood Count


 


 


 2.87 x10^6/uL


(3.50-5.40) 





 


Hemoglobin


 


 


 8.3 g/dL


(12.0-15.5) 





 


Hematocrit


 


 


 25.6 %


(36.0-47.0) 





 


Mean Corpuscular Volume   89 fL ()  


 


Mean Corpuscular Hemoglobin   29 pg (25-35)  


 


Mean Corpuscular Hemoglobin


Concent 


 


 32 g/dL


(31-37) 





 


Red Cell Distribution Width


 


 


 18.1 %


(11.5-14.5) 





 


Platelet Count


 


 


 497 x10^3/uL


(140-400) 





 


Neutrophils (%) (Auto)   68 % (31-73)  


 


Lymphocytes (%) (Auto)   25 % (24-48)  


 


Monocytes (%) (Auto)   6 % (0-9)  


 


Eosinophils (%) (Auto)   1 % (0-3)  


 


Basophils (%) (Auto)   0 % (0-3)  


 


Neutrophils # (Auto)


 


 


 9.6 x10^3/uL


(1.8-7.7) 





 


Lymphocytes # (Auto)


 


 


 3.5 x10^3/uL


(1.0-4.8) 





 


Monocytes # (Auto)


 


 


 0.9 x10^3/uL


(0.0-1.1) 





 


Eosinophils # (Auto)


 


 


 0.2 x10^3/uL


(0.0-0.7) 





 


Basophils # (Auto)


 


 


 0.0 x10^3/uL


(0.0-0.2) 





 


Sodium Level


 


 


 140 mmol/L


(136-145) 





 


Potassium Level


 


 


 4.5 mmol/L


(3.5-5.1) 





 


Chloride Level


 


 


 99 mmol/L


() 





 


Carbon Dioxide Level


 


 


 40 mmol/L


(21-32) 





 


Anion Gap   1 (6-14)  


 


Blood Urea Nitrogen


 


 


 26 mg/dL


(7-20) 





 


Creatinine


 


 


 0.5 mg/dL


(0.6-1.0) 





 


Estimated GFR


(Cockcroft-Gault) 


 


 131.1 


 





 


BUN/Creatinine Ratio   52 (6-20)  


 


Glucose Level


 


 


 127 mg/dL


(70-99) 





 


Calcium Level


 


 


 9.2 mg/dL


(8.5-10.1) 





 


Total Bilirubin


 


 


 0.4 mg/dL


(0.2-1.0) 





 


Aspartate Amino Transf


(AST/SGOT) 


 


 29 U/L (15-37) 


 





 


Alanine Aminotransferase


(ALT/SGPT) 


 


 27 U/L (14-59) 


 





 


Alkaline Phosphatase


 


 


 109 U/L


() 





 


Total Protein


 


 


 6.7 g/dL


(6.4-8.2) 





 


Albumin


 


 


 2.2 g/dL


(3.4-5.0) 





 


Albumin/Globulin Ratio   0.5 (1.0-1.7)  


 


Test


 5/17/20


17:46 5/17/20


23:47 5/18/20


05:56 5/18/20


06:00


 


Glucose (Fingerstick)


 145 mg/dL


(70-99) 240 mg/dL


(70-99) 162 mg/dL


(70-99) 





 


White Blood Count


 


 


 


 13.8 x10^3/uL


(4.0-11.0)


 


Red Blood Count


 


 


 


 2.81 x10^6/uL


(3.50-5.40)


 


Hemoglobin


 


 


 


 7.9 g/dL


(12.0-15.5)


 


Hematocrit


 


 


 


 24.9 %


(36.0-47.0)


 


Mean Corpuscular Volume    89 fL () 


 


Mean Corpuscular Hemoglobin    28 pg (25-35) 


 


Mean Corpuscular Hemoglobin


Concent 


 


 


 32 g/dL


(31-37)


 


Red Cell Distribution Width


 


 


 


 18.1 %


(11.5-14.5)


 


Platelet Count


 


 


 


 486 x10^3/uL


(140-400)


 


Neutrophils (%) (Auto)    72 % (31-73) 


 


Lymphocytes (%) (Auto)    21 % (24-48) 


 


Monocytes (%) (Auto)    6 % (0-9) 


 


Eosinophils (%) (Auto)    1 % (0-3) 


 


Basophils (%) (Auto)    0 % (0-3) 


 


Neutrophils # (Auto)


 


 


 


 10.0 x10^3/uL


(1.8-7.7)


 


Lymphocytes # (Auto)


 


 


 


 2.9 x10^3/uL


(1.0-4.8)


 


Monocytes # (Auto)


 


 


 


 0.8 x10^3/uL


(0.0-1.1)


 


Eosinophils # (Auto)


 


 


 


 0.1 x10^3/uL


(0.0-0.7)


 


Basophils # (Auto)


 


 


 


 0.0 x10^3/uL


(0.0-0.2)


 


Sodium Level


 


 


 


 141 mmol/L


(136-145)


 


Potassium Level


 


 


 


 4.8 mmol/L


(3.5-5.1)


 


Chloride Level


 


 


 


 99 mmol/L


()


 


Carbon Dioxide Level


 


 


 


 42 mmol/L


(21-32)


 


Anion Gap    0 (6-14) 


 


Blood Urea Nitrogen


 


 


 


 26 mg/dL


(7-20)


 


Creatinine


 


 


 


 0.6 mg/dL


(0.6-1.0)


 


Estimated GFR


(Cockcroft-Gault) 


 


 


 106.3 





 


Glucose Level


 


 


 


 164 mg/dL


(70-99)


 


Calcium Level


 


 


 


 9.3 mg/dL


(8.5-10.1)


 


Phosphorus Level


 


 


 


 4.3 mg/dL


(2.6-4.7)


 


Magnesium Level


 


 


 


 2.0 mg/dL


(1.8-2.4)


 


Triglycerides Level


 


 


 


 221 mg/dL


(0-150)


 


Test


 5/18/20


11:05 


 


 





 


Glucose (Fingerstick)


 236 mg/dL


(70-99) 


 


 











Laboratory Tests








Test


 5/17/20


12:00 5/17/20


17:46 5/17/20


23:47 5/18/20


05:56


 


Glucose (Fingerstick)


 225 mg/dL


(70-99) 145 mg/dL


(70-99) 240 mg/dL


(70-99) 162 mg/dL


(70-99)


 


Test


 5/18/20


06:00 5/18/20


11:05 


 





 


White Blood Count


 13.8 x10^3/uL


(4.0-11.0) 


 


 





 


Red Blood Count


 2.81 x10^6/uL


(3.50-5.40) 


 


 





 


Hemoglobin


 7.9 g/dL


(12.0-15.5) 


 


 





 


Hematocrit


 24.9 %


(36.0-47.0) 


 


 





 


Mean Corpuscular Volume 89 fL ()    


 


Mean Corpuscular Hemoglobin 28 pg (25-35)    


 


Mean Corpuscular Hemoglobin


Concent 32 g/dL


(31-37) 


 


 





 


Red Cell Distribution Width


 18.1 %


(11.5-14.5) 


 


 





 


Platelet Count


 486 x10^3/uL


(140-400) 


 


 





 


Neutrophils (%) (Auto) 72 % (31-73)    


 


Lymphocytes (%) (Auto) 21 % (24-48)    


 


Monocytes (%) (Auto) 6 % (0-9)    


 


Eosinophils (%) (Auto) 1 % (0-3)    


 


Basophils (%) (Auto) 0 % (0-3)    


 


Neutrophils # (Auto)


 10.0 x10^3/uL


(1.8-7.7) 


 


 





 


Lymphocytes # (Auto)


 2.9 x10^3/uL


(1.0-4.8) 


 


 





 


Monocytes # (Auto)


 0.8 x10^3/uL


(0.0-1.1) 


 


 





 


Eosinophils # (Auto)


 0.1 x10^3/uL


(0.0-0.7) 


 


 





 


Basophils # (Auto)


 0.0 x10^3/uL


(0.0-0.2) 


 


 





 


Sodium Level


 141 mmol/L


(136-145) 


 


 





 


Potassium Level


 4.8 mmol/L


(3.5-5.1) 


 


 





 


Chloride Level


 99 mmol/L


() 


 


 





 


Carbon Dioxide Level


 42 mmol/L


(21-32) 


 


 





 


Anion Gap 0 (6-14)    


 


Blood Urea Nitrogen


 26 mg/dL


(7-20) 


 


 





 


Creatinine


 0.6 mg/dL


(0.6-1.0) 


 


 





 


Estimated GFR


(Cockcroft-Gault) 106.3 


 


 


 





 


Glucose Level


 164 mg/dL


(70-99) 


 


 





 


Calcium Level


 9.3 mg/dL


(8.5-10.1) 


 


 





 


Phosphorus Level


 4.3 mg/dL


(2.6-4.7) 


 


 





 


Magnesium Level


 2.0 mg/dL


(1.8-2.4) 


 


 





 


Triglycerides Level


 221 mg/dL


(0-150) 


 


 





 


Glucose (Fingerstick)


 


 236 mg/dL


(70-99) 


 











Problem List


Problems


Medical Problems:


(1) Acute pancreatitis


Status: Acute  





(2) Cholelithiasis


Status: Acute  








Assessment/Plan


acute pancreatitis


anxiety








d/w Gladys CALL


will change pain meds to Fentanyl in hopes of managing pain with less sedation


continue supportive care











KIEL BAL MD                May 18, 2020 11:48

## 2020-05-18 NOTE — PDOC
PROGRESS NOTES


Assessment


Problems


Medical Problems:


(1) Acute pancreatitis


Status: Acute  





(2) Cholelithiasis


Status: Acute  





Respiratory failure.


Single seizure on 3/6, no recurrence.


Metabolic encephalopathy.


Fevers.


Metabolic acidosis.


Diffuse pulmonary infiltrate.


Pleural effusion.


Pancreatitis, necrotizing.


Gallstone.


Leukocytosis.


Lymphopenia.


Electrolytes imbalances.


Hyperglycemia.


DM.


HTN.


HLD.


Anemia.


Abnormal CXR.


Obesity.


Ascites and pleural effusion


Plan


Keppra if she has further seizures.


Treat medical diseases.


Subjective


None


Objective





Vital Signs








  Date Time  Temp Pulse Resp B/P (MAP) Pulse Ox O2 Delivery O2 Flow Rate FiO2


 


5/18/20 08:18      Nasal Cannula 3.0 


 


5/18/20 08:00  93 16 95/48 (64) 100   


 


5/18/20 07:00 98.4       





 98.4       














Intake and Output 


 


 5/18/20





 07:00


 


Intake Total 3454.23 ml


 


Output Total 4335 ml


 


Balance -880.77 ml


 


 


 


IV Total 3454.23 ml


 


Output Urine Total 3855 ml


 


Drainage Total 480 ml








PHYSICAL EXAM


Sedated. Tracheostomy capped


PERRL.


EOMI.


CN: no focal findings.


Muscle tone: normal.


Muscle strength: not cooperative


DTR: 1+


Plantar reflex: Silent


Gait: not examined in bed.


Sensory exam: not cooperative


Cerebellar: not cooperative


Review of Relevant


I have reviewed the following items josy (where applicable) has been applied.


Labs





Laboratory Tests








Test


 5/16/20


11:11 5/16/20


17:46 5/17/20


00:41 5/17/20


07:00


 


Glucose (Fingerstick)


 162 mg/dL


(70-99) 144 mg/dL


(70-99) 177 mg/dL


(70-99) 





 


White Blood Count


 


 


 


 14.2 x10^3/uL


(4.0-11.0)


 


Red Blood Count


 


 


 


 2.87 x10^6/uL


(3.50-5.40)


 


Hemoglobin


 


 


 


 8.3 g/dL


(12.0-15.5)


 


Hematocrit


 


 


 


 25.6 %


(36.0-47.0)


 


Mean Corpuscular Volume    89 fL () 


 


Mean Corpuscular Hemoglobin    29 pg (25-35) 


 


Mean Corpuscular Hemoglobin


Concent 


 


 


 32 g/dL


(31-37)


 


Red Cell Distribution Width


 


 


 


 18.1 %


(11.5-14.5)


 


Platelet Count


 


 


 


 497 x10^3/uL


(140-400)


 


Neutrophils (%) (Auto)    68 % (31-73) 


 


Lymphocytes (%) (Auto)    25 % (24-48) 


 


Monocytes (%) (Auto)    6 % (0-9) 


 


Eosinophils (%) (Auto)    1 % (0-3) 


 


Basophils (%) (Auto)    0 % (0-3) 


 


Neutrophils # (Auto)


 


 


 


 9.6 x10^3/uL


(1.8-7.7)


 


Lymphocytes # (Auto)


 


 


 


 3.5 x10^3/uL


(1.0-4.8)


 


Monocytes # (Auto)


 


 


 


 0.9 x10^3/uL


(0.0-1.1)


 


Eosinophils # (Auto)


 


 


 


 0.2 x10^3/uL


(0.0-0.7)


 


Basophils # (Auto)


 


 


 


 0.0 x10^3/uL


(0.0-0.2)


 


Sodium Level


 


 


 


 140 mmol/L


(136-145)


 


Potassium Level


 


 


 


 4.5 mmol/L


(3.5-5.1)


 


Chloride Level


 


 


 


 99 mmol/L


()


 


Carbon Dioxide Level


 


 


 


 40 mmol/L


(21-32)


 


Anion Gap    1 (6-14) 


 


Blood Urea Nitrogen


 


 


 


 26 mg/dL


(7-20)


 


Creatinine


 


 


 


 0.5 mg/dL


(0.6-1.0)


 


Estimated GFR


(Cockcroft-Gault) 


 


 


 131.1 





 


BUN/Creatinine Ratio    52 (6-20) 


 


Glucose Level


 


 


 


 127 mg/dL


(70-99)


 


Calcium Level


 


 


 


 9.2 mg/dL


(8.5-10.1)


 


Total Bilirubin


 


 


 


 0.4 mg/dL


(0.2-1.0)


 


Aspartate Amino Transf


(AST/SGOT) 


 


 


 29 U/L (15-37) 





 


Alanine Aminotransferase


(ALT/SGPT) 


 


 


 27 U/L (14-59) 





 


Alkaline Phosphatase


 


 


 


 109 U/L


()


 


Total Protein


 


 


 


 6.7 g/dL


(6.4-8.2)


 


Albumin


 


 


 


 2.2 g/dL


(3.4-5.0)


 


Albumin/Globulin Ratio    0.5 (1.0-1.7) 


 


Test


 5/17/20


12:00 5/17/20


17:46 5/17/20


23:47 5/18/20


05:56


 


Glucose (Fingerstick)


 225 mg/dL


(70-99) 145 mg/dL


(70-99) 240 mg/dL


(70-99) 162 mg/dL


(70-99)


 


Test


 5/18/20


06:00 


 


 





 


White Blood Count


 13.8 x10^3/uL


(4.0-11.0) 


 


 





 


Red Blood Count


 2.81 x10^6/uL


(3.50-5.40) 


 


 





 


Hemoglobin


 7.9 g/dL


(12.0-15.5) 


 


 





 


Hematocrit


 24.9 %


(36.0-47.0) 


 


 





 


Mean Corpuscular Volume 89 fL ()    


 


Mean Corpuscular Hemoglobin 28 pg (25-35)    


 


Mean Corpuscular Hemoglobin


Concent 32 g/dL


(31-37) 


 


 





 


Red Cell Distribution Width


 18.1 %


(11.5-14.5) 


 


 





 


Platelet Count


 486 x10^3/uL


(140-400) 


 


 





 


Neutrophils (%) (Auto) 72 % (31-73)    


 


Lymphocytes (%) (Auto) 21 % (24-48)    


 


Monocytes (%) (Auto) 6 % (0-9)    


 


Eosinophils (%) (Auto) 1 % (0-3)    


 


Basophils (%) (Auto) 0 % (0-3)    


 


Neutrophils # (Auto)


 10.0 x10^3/uL


(1.8-7.7) 


 


 





 


Lymphocytes # (Auto)


 2.9 x10^3/uL


(1.0-4.8) 


 


 





 


Monocytes # (Auto)


 0.8 x10^3/uL


(0.0-1.1) 


 


 





 


Eosinophils # (Auto)


 0.1 x10^3/uL


(0.0-0.7) 


 


 





 


Basophils # (Auto)


 0.0 x10^3/uL


(0.0-0.2) 


 


 





 


Sodium Level


 141 mmol/L


(136-145) 


 


 





 


Potassium Level


 4.8 mmol/L


(3.5-5.1) 


 


 





 


Chloride Level


 99 mmol/L


() 


 


 





 


Carbon Dioxide Level


 42 mmol/L


(21-32) 


 


 





 


Anion Gap 0 (6-14)    


 


Blood Urea Nitrogen


 26 mg/dL


(7-20) 


 


 





 


Creatinine


 0.6 mg/dL


(0.6-1.0) 


 


 





 


Estimated GFR


(Cockcroft-Gault) 106.3 


 


 


 





 


Glucose Level


 164 mg/dL


(70-99) 


 


 





 


Calcium Level


 9.3 mg/dL


(8.5-10.1) 


 


 





 


Phosphorus Level


 4.3 mg/dL


(2.6-4.7) 


 


 





 


Magnesium Level


 2.0 mg/dL


(1.8-2.4) 


 


 





 


Triglycerides Level


 221 mg/dL


(0-150) 


 


 











Laboratory Tests








Test


 5/17/20


12:00 5/17/20


17:46 5/17/20


23:47 5/18/20


05:56


 


Glucose (Fingerstick)


 225 mg/dL


(70-99) 145 mg/dL


(70-99) 240 mg/dL


(70-99) 162 mg/dL


(70-99)


 


Test


 5/18/20


06:00 


 


 





 


White Blood Count


 13.8 x10^3/uL


(4.0-11.0) 


 


 





 


Red Blood Count


 2.81 x10^6/uL


(3.50-5.40) 


 


 





 


Hemoglobin


 7.9 g/dL


(12.0-15.5) 


 


 





 


Hematocrit


 24.9 %


(36.0-47.0) 


 


 





 


Mean Corpuscular Volume 89 fL ()    


 


Mean Corpuscular Hemoglobin 28 pg (25-35)    


 


Mean Corpuscular Hemoglobin


Concent 32 g/dL


(31-37) 


 


 





 


Red Cell Distribution Width


 18.1 %


(11.5-14.5) 


 


 





 


Platelet Count


 486 x10^3/uL


(140-400) 


 


 





 


Neutrophils (%) (Auto) 72 % (31-73)    


 


Lymphocytes (%) (Auto) 21 % (24-48)    


 


Monocytes (%) (Auto) 6 % (0-9)    


 


Eosinophils (%) (Auto) 1 % (0-3)    


 


Basophils (%) (Auto) 0 % (0-3)    


 


Neutrophils # (Auto)


 10.0 x10^3/uL


(1.8-7.7) 


 


 





 


Lymphocytes # (Auto)


 2.9 x10^3/uL


(1.0-4.8) 


 


 





 


Monocytes # (Auto)


 0.8 x10^3/uL


(0.0-1.1) 


 


 





 


Eosinophils # (Auto)


 0.1 x10^3/uL


(0.0-0.7) 


 


 





 


Basophils # (Auto)


 0.0 x10^3/uL


(0.0-0.2) 


 


 





 


Sodium Level


 141 mmol/L


(136-145) 


 


 





 


Potassium Level


 4.8 mmol/L


(3.5-5.1) 


 


 





 


Chloride Level


 99 mmol/L


() 


 


 





 


Carbon Dioxide Level


 42 mmol/L


(21-32) 


 


 





 


Anion Gap 0 (6-14)    


 


Blood Urea Nitrogen


 26 mg/dL


(7-20) 


 


 





 


Creatinine


 0.6 mg/dL


(0.6-1.0) 


 


 





 


Estimated GFR


(Cockcroft-Gault) 106.3 


 


 


 





 


Glucose Level


 164 mg/dL


(70-99) 


 


 





 


Calcium Level


 9.3 mg/dL


(8.5-10.1) 


 


 





 


Phosphorus Level


 4.3 mg/dL


(2.6-4.7) 


 


 





 


Magnesium Level


 2.0 mg/dL


(1.8-2.4) 


 


 





 


Triglycerides Level


 221 mg/dL


(0-150) 


 


 











Microbiology


5/17/20 Blood Culture - Preliminary, Resulted


          NO GROWTH AFTER 1 DAY


5/6/20 Fungal Culture - Final, Complete


         


5/6/20 Fungal Culture Result 1 - Final, Complete


         


4/30/20 Aerobic Culture - Final, Complete


          


4/30/20 Aerobic Culture Result 1 ([facility]) - Final, Complete


          


4/30/20 Gram Stain - Final, Complete


          


4/30/20 Gram Stain Result 1 ([facility]) - Final, Complete


          


4/30/20 Gram Stain Result 2 ([facility]) - Final, Complete


          


4/12/20 Urine Culture - Final, Complete


          


4/12/20 Urine Culture Result 1 ([facility]) - Final, Complete


Medications





Current Medications


Sodium Chloride 1,000 ml @  1,000 mls/hr Q1H IV  Last administered on 3/16/20at 

03:00;  Start 3/16/20 at 03:00;  Stop 3/16/20 at 03:59;  Status DC


Ondansetron HCl (Zofran) 4 mg 1X  ONCE IVP  Last administered on 3/16/20at 

03:27;  Start 3/16/20 at 03:00;  Stop 3/16/20 at 03:01;  Status DC


Morphine Sulfate (Morphine Sulfate) 4 mg 1X  ONCE IV ;  Start 3/16/20 at 03:00; 

Stop 3/16/20 at 03:01;  Status Cancel


Ketorolac Tromethamine (Toradol 30mg Vial) 30 mg 1X  ONCE IV  Last administered 

on 3/16/20at 02:54;  Start 3/16/20 at 03:00;  Stop 3/16/20 at 03:01;  Status DC


Fentanyl Citrate (Fentanyl 2ml Vial) 25 mcg 1X  ONCE IVP  Last administered on 

3/16/20at 03:23;  Start 3/16/20 at 03:30;  Stop 3/16/20 at 03:31;  Status DC


Fentanyl Citrate (Fentanyl 2ml Vial) 100 mcg STK-MED ONCE .ROUTE ;  Start 

3/16/20 at 03:18;  Stop 3/16/20 at 03:18;  Status DC


Iohexol (Omnipaque 350 Mg/ml) 90 ml 1X  ONCE IV  Last administered on 3/16/20at 

03:25;  Start 3/16/20 at 03:30;  Stop 3/16/20 at 03:31;  Status DC


Info (CONTRAST GIVEN -- Rx MONITORING) 1 each PRN DAILY  PRN MC SEE COMMENTS;  

Start 3/16/20 at 03:30;  Stop 3/18/20 at 03:29;  Status DC


Hydromorphone HCl (Dilaudid) 0.5 mg 1X  ONCE IV  Last administered on 3/16/20at 

03:55;  Start 3/16/20 at 04:30;  Stop 3/16/20 at 04:32;  Status DC


Ondansetron HCl (Zofran) 4 mg PRN Q8HRS  PRN IV NAUSEA/VOMITING 1ST CHOICE;  

Start 3/16/20 at 05:00;  Stop 3/16/20 at 09:27;  Status DC


Morphine Sulfate (Morphine Sulfate) 2 mg PRN Q2HR  PRN IV SEVERE PAIN 7-10 Last 

administered on 3/17/20at 12:26;  Start 3/16/20 at 05:00;  Stop 3/17/20 at 

14:15;  Status DC


Sodium Chloride 1,000 ml @  125 mls/hr Q8H IV  Last administered on 3/16/20at 

20:56;  Start 3/16/20 at 05:00;  Stop 3/17/20 at 04:59;  Status DC


Hydromorphone HCl (Dilaudid) 0.5 mg PRN Q3HRS  PRN IV SEVERE PAIN 7-10 Last 

administered on 3/17/20at 10:06;  Start 3/16/20 at 05:00;  Stop 3/17/20 at 

12:01;  Status DC


Piperacillin Sod/ Tazobactam Sod 4.5 gm/Sodium Chloride 100 ml @  200 mls/hr 1X 

ONCE IV  Last administered on 3/16/20at 05:44;  Start 3/16/20 at 06:00;  Stop 

3/16/20 at 06:29;  Status DC


Ondansetron HCl (Zofran) 4 mg PRN Q4HRS  PRN IV NAUSEA/VOMITING 1ST CHOICE Last 

administered on 5/10/20at 17:09;  Start 3/16/20 at 09:30


Insulin Human Lispro (HumaLOG) 0-9 UNITS Q6HRS SQ  Last administered on 

5/18/20at 06:00;  Start 3/16/20 at 09:30


Dextrose (Dextrose 50%-Water Syringe) 12.5 gm PRN Q15MIN  PRN IV SEE COMMENTS;  

Start 3/16/20 at 09:30


Pantoprazole Sodium (PROTONIX VIAL for IV PUSH) 40 mg DAILYAC IVP  Last adminis

tered on 5/18/20at 08:07;  Start 3/16/20 at 11:30


Prochlorperazine Edisylate (Compazine) 10 mg PRN Q6HRS  PRN IV NAUSEA/VOMITING, 

2nd CHOICE Last administered on 5/14/20at 06:11;  Start 3/16/20 at 17:45


Atenolol (Tenormin) 100 mg DAILY PO ;  Start 3/17/20 at 09:00;  Stop 3/16/20 at 

20:08;  Status DC


Metoprolol Tartrate (Lopressor Vial) 2.5 mg Q6HRS IVP  Last administered on 

3/17/20at 05:51;  Start 3/16/20 at 20:15;  Stop 3/17/20 at 10:02;  Status DC


Metoprolol Tartrate (Lopressor Vial) 5 mg Q6HRS IVP  Last administered on 

3/26/20at 00:12;  Start 3/17/20 at 10:15;  Stop 3/28/20 at 08:48;  Status DC


Hydromorphone HCl (Dilaudid) 1 mg PRN Q3HRS  PRN IV SEVERE PAIN 7-10 Last 

administered on 3/23/20at 05:13;  Start 3/17/20 at 12:00;  Stop 3/31/20 at 

00:25;  Status DC


Lidocaine HCl (Buffered Lidocaine 1%) 3 ml STK-MED ONCE .ROUTE ;  Start 3/17/20 

at 12:55;  Stop 3/17/20 at 12:56;  Status DC


Albumin Human 500 ml @  125 mls/hr 1X  ONCE IV  Last administered on 3/17/20at 

14:33;  Start 3/17/20 at 14:30;  Stop 3/17/20 at 18:32;  Status DC


Norepinephrine Bitartrate 8 mg/ Dextrose 258 ml @  17.299 mls/ hr CONT  PRN IV 

PER PROTOCOL Last administered on 4/14/20at 12:48;  Start 3/17/20 at 15:30;  

Stop 4/17/20 at 09:19;  Status DC


Sodium Chloride 1,000 ml @  125 mls/hr Q8H IV  Last administered on 3/17/20at 

21:04;  Start 3/17/20 at 16:00;  Stop 3/18/20 at 02:42;  Status DC


Albumin Human 500 ml @  125 mls/hr PRN BID  PRN IV After every 2L NSS & BP < 

90mm Last administered on 4/1/20at 14:21;  Start 3/17/20 at 16:00


Iohexol (Omnipaque 300 Mg/ml) 60 ml 1X  ONCE IV  Last administered on 3/17/20at 

17:20;  Start 3/17/20 at 17:00;  Stop 3/17/20 at 17:01;  Status DC


Info (CONTRAST GIVEN -- Rx MONITORING) 1 each PRN DAILY  PRN MC SEE COMMENTS;  

Start 3/17/20 at 17:00;  Stop 3/19/20 at 16:59;  Status DC


Meropenem 1 gm/ Sodium Chloride 100 ml @  200 mls/hr Q8HRS IV  Last administered

on 3/18/20at 05:45;  Start 3/17/20 at 20:00;  Stop 3/18/20 at 08:48;  Status DC


Furosemide (Lasix) 40 mg 1X  ONCE IVP  Last administered on 3/17/20at 22:12;  

Start 3/17/20 at 22:30;  Stop 3/17/20 at 22:31;  Status DC


Calcium Chloride 1000 mg/Sodium Chloride 110 ml @  220 mls/hr 1X  ONCE IV  Last 

administered on 3/17/20at 22:11;  Start 3/17/20 at 22:30;  Stop 3/17/20 at 

22:59;  Status DC


Albuterol Sulfate (Ventolin Neb Soln) 2.5 mg 1X  ONCE NEB  Last administered on 

3/18/20at 00:56;  Start 3/17/20 at 22:30;  Stop 3/17/20 at 22:31;  Status DC


Insulin Human Regular (HumuLIN R VIAL) 5 unit 1X  ONCE IV  Last administered on 

3/17/20at 22:14;  Start 3/17/20 at 22:30;  Stop 3/17/20 at 22:31;  Status DC


Magnesium Sulfate 50 ml @ 25 mls/hr 1X  ONCE IV  Last administered on 3/18/20at 

02:57;  Start 3/18/20 at 03:00;  Stop 3/18/20 at 04:59;  Status DC


Calcium Gluconate 1000 mg/Sodium Chloride 110 ml @  220 mls/hr 1X  ONCE IV  Last

administered on 3/18/20at 02:46;  Start 3/18/20 at 03:00;  Stop 3/18/20 at 

03:29;  Status DC


Sodium Chloride 1,000 ml @  200 mls/hr Q5H IV  Last administered on 3/18/20at 

02:46;  Start 3/18/20 at 03:00;  Stop 3/18/20 at 10:21;  Status DC


Calcium Gluconate 1000 mg/Sodium Chloride 110 ml @  220 mls/hr 1X  ONCE IV  Last

administered on 3/18/20at 03:21;  Start 3/18/20 at 03:30;  Stop 3/18/20 at 

03:59;  Status DC


Sodium Bicarbonate 50 meq/Sodium Chloride 1,050 ml @  75 mls/hr Q14H IV  Last 

administered on 3/22/20at 21:10;  Start 3/18/20 at 07:30;  Stop 3/23/20 at 

10:28;  Status DC


Calcium Gluconate 2000 mg/Sodium Chloride 120 ml @  220 mls/hr 1X  ONCE IV  Last

administered on 3/18/20at 09:05;  Start 3/18/20 at 07:30;  Stop 3/18/20 at 

08:02;  Status DC


Lidocaine HCl (Xylocaine-Mpf 1% 2ml Vial) 2 ml STK-MED ONCE .ROUTE ;  Start 

3/18/20 at 08:47;  Stop 3/18/20 at 08:47;  Status DC


Meropenem 500 mg/ Sodium Chloride 50 ml @  100 mls/hr Q12HR IV  Last 

administered on 3/23/20at 21:01;  Start 3/18/20 at 18:00;  Stop 3/24/20 at 

07:58;  Status DC


Lidocaine HCl (Buffered Lidocaine 1%) 3 ml STK-MED ONCE .ROUTE ;  Start 3/18/20 

at 09:46;  Stop 3/18/20 at 09:46;  Status DC


Lidocaine HCl (Buffered Lidocaine 1%) 6 ml 1X  ONCE INJ  Last administered on 

3/18/20at 10:26;  Start 3/18/20 at 10:15;  Stop 3/18/20 at 10:16;  Status DC


Info (Tpn Per Pharmacy) 1 each PRN DAILY  PRN MC SEE COMMENTS Last administered 

on 5/17/20at 10:36;  Start 3/18/20 at 12:00


Sodium Chloride 1,000 ml @  1,000 mls/hr Q1H PRN IV hypotension;  Start 3/18/20 

at 12:07;  Stop 3/18/20 at 18:06;  Status DC


Diphenhydramine HCl (Benadryl) 25 mg 1X PRN  PRN IV ITCHING;  Start 3/18/20 at 

12:15;  Stop 3/19/20 at 12:14;  Status DC


Diphenhydramine HCl (Benadryl) 25 mg 1X PRN  PRN IV ITCHING;  Start 3/18/20 at 

12:15;  Stop 3/19/20 at 12:14;  Status DC


Sodium Chloride 1,000 ml @  400 mls/hr Q2H30M PRN IV PATENCY;  Start 3/18/20 at 

12:07;  Stop 3/19/20 at 00:06;  Status DC


Info (PHARMACY MONITORING -- do not chart) 1 each PRN DAILY  PRN MC SEE 

COMMENTS;  Start 3/18/20 at 12:15;  Stop 3/20/20 at 08:13;  Status DC


Sodium Chloride 90 meq/Calcium Gluconate 10 meq/ Multivitamins 10 ml/Chromium/ 

Copper/Manganese/ Seleni/Zn 1 ml/ Total Parenteral Nutrition/Amino 

Acids/Dextrose/ Fat Emulsion Intravenous 55.005 ml  @ 2.292 mls/hr TPN  CONT IV 

;  Start 3/18/20 at 22:00;  Stop 3/18/20 at 12:33;  Status DC


Info (Tpn Per Pharmacy) 1 each PRN DAILY  PRN MC SEE COMMENTS;  Start 3/18/20 at

12:30;  Status UNV


Sodium Chloride 90 meq/Calcium Gluconate 10 meq/ Multivitamins 10 ml/Chromium/ 

Copper/Manganese/ Seleni/Zn 0.5 ml/ Total Parenteral Nutrition/Amino 

Acids/Dextrose/ Fat Emulsion Intravenous 1,512 ml @  63 mls/hr TPN  CONT IV  

Last administered on 3/18/20at 22:06;  Start 3/18/20 at 22:00;  Stop 3/19/20 at 

21:59;  Status DC


Calcium Carbonate/ Glycine (Tums) 500 mg PRN AFTMEALHC  PRN PO INDIGESTION;  

Start 3/18/20 at 17:45;  Stop 5/13/20 at 10:25;  Status DC


Calcium Gluconate (Calcium Gluconate) 2,000 mg 1X  ONCE IVP  Last administered 

on 3/19/20at 02:19;  Start 3/19/20 at 02:15;  Stop 3/19/20 at 02:16;  Status DC


Calcium Chloride 3000 mg/Sodium Chloride 1,030 ml @  50 mls/hr [id number] IV  Last 

administered on 3/21/20at 02:17;  Start 3/19/20 at 08:00;  Stop 3/21/20 at 

15:23;  Status DC


Lorazepam (Ativan Inj) 1 mg PRN Q4HRS  PRN IVP ANXIETY / AGITATION, 2nd choic 

Last administered on 4/17/20at 03:51;  Start 3/19/20 at 09:00;  Stop 4/17/20 at 

09:19;  Status DC


Sodium Chloride 1,000 ml @  1,000 mls/hr Q1H PRN IV hypotension;  Start 3/19/20 

at 08:56;  Stop 3/19/20 at 14:55;  Status DC


Albumin Human 200 ml @  200 mls/hr 1X PRN  PRN IV Hypotension;  Start 3/19/20 at

09:00;  Stop 3/19/20 at 14:59;  Status DC


Diphenhydramine HCl (Benadryl) 25 mg 1X PRN  PRN IV ITCHING;  Start 3/19/20 at 

09:00;  Stop 3/20/20 at 08:59;  Status DC


Diphenhydramine HCl (Benadryl) 25 mg 1X PRN  PRN IV ITCHING;  Start 3/19/20 at 

09:00;  Stop 3/20/20 at 08:59;  Status DC


Sodium Chloride 1,000 ml @  400 mls/hr Q2H30M PRN IV PATENCY;  Start 3/19/20 at 

08:56;  Stop 3/19/20 at 20:55;  Status DC


Info (PHARMACY MONITORING -- do not chart) 1 each PRN DAILY  PRN MC SEE 

COMMENTS;  Start 3/19/20 at 09:00;  Status UNV


Info (PHARMACY MONITORING -- do not chart) 1 each PRN DAILY  PRN MC SEE 

COMMENTS;  Start 3/19/20 at 09:00;  Stop 3/20/20 at 08:13;  Status DC


Digoxin (Lanoxin) 500 mcg 1X  ONCE IV  Last administered on 3/19/20at 10:04;  

Start 3/19/20 at 10:00;  Stop 3/19/20 at 10:01;  Status DC


Digoxin (Lanoxin) 125 mcg 1X  ONCE IV  Last administered on 3/19/20at 17:10;  

Start 3/19/20 at 18:00;  Stop 3/19/20 at 18:01;  Status DC


Magnesium Sulfate 100 ml @  25 mls/hr 1X  ONCE IV  Last administered on 

3/19/20at 12:48;  Start 3/19/20 at 13:00;  Stop 3/19/20 at 16:59;  Status DC


Sodium Chloride 90 meq/Magnesium Sulfate 10 meq/ Calcium Gluconate 20 meq/ 

Multivitamins 10 ml/Chromium/ Copper/Manganese/ Seleni/Zn 0.5 ml/ Total 

Parenteral Nutrition/Amino Acids/Dextrose/ Fat Emulsion Intravenous 1,512 ml @  

63 mls/hr TPN  CONT IV  Last administered on 3/19/20at 22:25;  Start 3/19/20 at 

22:00;  Stop 3/20/20 at 21:59;  Status DC


Sodium Chloride 1,000 ml @  1,000 mls/hr Q1H PRN IV hypotension;  Start 3/20/20 

at 08:05;  Stop 3/20/20 at 14:04;  Status DC


Albumin Human 200 ml @  200 mls/hr 1X  ONCE IV  Last administered on 3/20/20at 

08:57;  Start 3/20/20 at 08:15;  Stop 3/20/20 at 09:14;  Status DC


Diphenhydramine HCl (Benadryl) 25 mg 1X PRN  PRN IV ITCHING;  Start 3/20/20 at 

08:15;  Stop 3/21/20 at 08:14;  Status DC


Diphenhydramine HCl (Benadryl) 25 mg 1X PRN  PRN IV ITCHING;  Start 3/20/20 at 

08:15;  Stop 3/21/20 at 08:14;  Status DC


Sodium Chloride 1,000 ml @  400 mls/hr Q2H30M PRN IV PATENCY;  Start 3/20/20 at 

08:05;  Stop 3/20/20 at 20:04;  Status DC


Info (PHARMACY MONITORING -- do not chart) 1 each PRN DAILY  PRN MC SEE 

COMMENTS;  Start 3/20/20 at 08:15;  Stop 3/24/20 at 07:57;  Status DC


Sodium Chloride 90 meq/Potassium Chloride 15 meq/ Potassium Phosphate 10 mmol/ 

Magnesium Sulfate 10 meq/Calcium Gluconate 20 meq/ Multivitamins 10 ml/Chromium/

Copper/Manganese/ Seleni/Zn 0.5 ml/ Total Parenteral Nutrition/Amino 

Acids/Dextrose/ Fat Emulsion Intravenous 1,512 ml @  63 mls/hr TPN  CONT IV  

Last administered on 3/20/20at 21:01;  Start 3/20/20 at 22:00;  Stop 3/21/20 at 

21:59;  Status DC


Potassium Chloride/Water 100 ml @  100 mls/hr 1X  ONCE IV  Last administered on 

3/20/20at 14:09;  Start 3/20/20 at 14:00;  Stop 3/20/20 at 14:59;  Status DC


Benzocaine (Hurricaine One) 1 spray 1X  ONCE MM  Last administered on 3/20/20at 

16:38;  Start 3/20/20 at 14:30;  Stop 3/20/20 at 14:31;  Status DC


Lidocaine HCl (Glydo (Lidocaine) Jelly) 1 thomas 1X  ONCE MM  Last administered on 

3/20/20at 16:38;  Start 3/20/20 at 14:30;  Stop 3/20/20 at 14:31;  Status DC


Linezolid/Dextrose 300 ml @  300 mls/hr Q12HR IV  Last administered on 3/26/20at

21:04;  Start 3/20/20 at 20:00;  Stop 3/27/20 at 07:50;  Status DC


Acetaminophen (Tylenol) 650 mg PRN Q6HRS  PRN PO MILD PAIN / TEMP;  Start 

3/21/20 at 03:30;  Stop 3/21/20 at 03:36;  Status DC


Acetaminophen (Tylenol) 650 mg PRN Q6HRS  PRN PEG MILD PAIN / TEMP Last 

administered on 4/16/20at 19:56;  Start 3/21/20 at 03:36;  Stop 5/13/20 at 

10:25;  Status DC


Sodium Chloride 1,000 ml @  1,000 mls/hr Q1H PRN IV hypotension;  Start 3/21/20 

at 07:50;  Stop 3/21/20 at 13:49;  Status DC


Albumin Human 200 ml @  200 mls/hr 1X PRN  PRN IV Hypotension;  Start 3/21/20 at

08:00;  Stop 3/21/20 at 13:59;  Status DC


Sodium Chloride (Normal Saline Flush) 10 ml 1X PRN  PRN IV AP catheter pack;  

Start 3/21/20 at 08:00;  Stop 3/22/20 at 07:59;  Status DC


Sodium Chloride (Normal Saline Flush) 10 ml 1X PRN  PRN IV  catheter pack;  

Start 3/21/20 at 08:00;  Stop 3/22/20 at 07:59;  Status DC


Sodium Chloride 1,000 ml @  400 mls/hr Q2H30M PRN IV PATENCY;  Start 3/21/20 at 

07:50;  Stop 3/21/20 at 19:49;  Status DC


Info (PHARMACY MONITORING -- do not chart) 1 each PRN DAILY  PRN MC SEE 

COMMENTS;  Start 3/21/20 at 08:00;  Status UNV


Info (PHARMACY MONITORING -- do not chart) 1 each PRN DAILY  PRN MC SEE 

COMMENTS;  Start 3/21/20 at 08:00;  Stop 3/23/20 at 08:25;  Status DC


Sodium Chloride 90 meq/Potassium Chloride 15 meq/ Potassium Phosphate 10 mmol/ 

Magnesium Sulfate 10 meq/Calcium Gluconate 20 meq/ Multivitamins 10 ml/Chromium/

Copper/Manganese/ Seleni/Zn 0.5 ml/ Total Parenteral Nutrition/Amino 

Acids/Dextrose/ Fat Emulsion Intravenous 1,512 ml @  63 mls/hr TPN  CONT IV  

Last administered on 3/21/20at 20:57;  Start 3/21/20 at 22:00;  Stop 3/22/20 at 

21:59;  Status DC


Sodium Chloride 90 meq/Potassium Chloride 15 meq/ Potassium Phosphate 15 mmol/ 

Magnesium Sulfate 10 meq/Calcium Gluconate 20 meq/ Multivitamins 10 ml/Chromium/

Copper/Manganese/ Seleni/Zn 0.5 ml/ Total Parenteral Nutrition/Amino 

Acids/Dextrose/ Fat Emulsion Intravenous 1,512 ml @  63 mls/hr TPN  CONT IV ;  

Start 3/22/20 at 22:00;  Stop 3/22/20 at 14:16;  Status DC


Sodium Chloride 90 meq/Potassium Chloride 15 meq/ Potassium Phosphate 15 mmol/ 

Magnesium Sulfate 10 meq/Calcium Gluconate 20 meq/ Multivitamins 10 ml/Chromium/

Copper/Manganese/ Seleni/Zn 0.5 ml/ Total Parenteral Nutrition/Amino 

Acids/Dextrose/ Fat Emulsion Intravenous 1,200 ml @  50 mls/hr TPN  CONT IV ;  

Start 3/22/20 at 22:00;  Stop 3/22/20 at 14:17;  Status DC


Sodium Chloride 90 meq/Potassium Chloride 15 meq/ Potassium Phosphate 10 mmol/ 

Magnesium Sulfate 10 meq/Calcium Gluconate 20 meq/ Multivitamins 10 ml/Chromium/

Copper/Manganese/ Seleni/Zn 0.5 ml/ Total Parenteral Nutrition/Amino 

Acids/Dextrose/ Fat Emulsion Intravenous 1,200 ml @  50 mls/hr TPN  CONT IV  

Last administered on 3/22/20at 23:29;  Start 3/22/20 at 22:00;  Stop 3/23/20 at 

21:59;  Status DC


Sodium Chloride 1,000 ml @  1,000 mls/hr Q1H PRN IV hypotension;  Start 3/23/20 

at 07:28;  Stop 3/23/20 at 13:27;  Status DC


Albumin Human 200 ml @  200 mls/hr 1X  ONCE IV  Last administered on 3/23/20at 

08:51;  Start 3/23/20 at 07:30;  Stop 3/23/20 at 08:29;  Status DC


Diphenhydramine HCl (Benadryl) 25 mg 1X PRN  PRN IV ITCHING;  Start 3/23/20 at 

07:30;  Stop 3/24/20 at 07:29;  Status DC


Diphenhydramine HCl (Benadryl) 25 mg 1X PRN  PRN IV ITCHING;  Start 3/23/20 at 

07:30;  Stop 3/24/20 at 07:29;  Status DC


Sodium Chloride 1,000 ml @  400 mls/hr Q2H30M PRN IV PATENCY;  Start 3/23/20 at 

07:28;  Stop 3/23/20 at 19:27;  Status DC


Info (PHARMACY MONITORING -- do not chart) 1 each PRN DAILY  PRN MC SEE 

COMMENTS;  Start 3/23/20 at 07:30;  Stop 4/3/20 at 13:01;  Status DC


Metronidazole 100 ml @  100 mls/hr Q6HRS IV  Last administered on 4/8/20at 

06:26;  Start 3/23/20 at 08:30;  Stop 4/8/20 at 09:58;  Status DC


Micafungin Sodium 100 mg/Dextrose 100 ml @  100 mls/hr Q24H IV  Last 

administered on 4/30/20at 08:18;  Start 3/23/20 at 09:00;  Stop 4/30/20 at 

20:58;  Status DC


Propofol 0 ml @ As Directed STK-MED ONCE IV ;  Start 3/23/20 at 07:53;  Stop 

3/23/20 at 07:53;  Status DC


Etomidate (Amidate) 20 mg STK-MED ONCE IV ;  Start 3/23/20 at 07:53;  Stop 3/23/

20 at 07:54;  Status DC


Midazolam HCl (Versed) 5 mg STK-MED ONCE .ROUTE ;  Start 3/23/20 at 07:57;  Stop

3/23/20 at 07:57;  Status DC


Fentanyl Citrate 30 ml @ 0 mls/hr CONT  PRN IV SEE PROTOCOL Last administered on

4/17/20at 06:12;  Start 3/23/20 at 08:15;  Stop 4/17/20 at 09:19;  Status DC


Artificial Tears (Artificial Tears) 1 drop PRN Q1HR  PRN OU DRY EYE, 1st choice;

 Start 3/23/20 at 08:15;  Stop 4/29/20 at 05:31;  Status DC


Midazolam HCl 50 mg/Sodium Chloride 50 ml @ 0 mls/hr CONT  PRN IV SEE PROTOCOL 

Last administered on 3/26/20at 22:39;  Start 3/23/20 at 08:15;  Stop 3/28/20 at 

15:59;  Status DC


Etomidate (Amidate) 8 mg 1X  ONCE IV  Last administered on 3/23/20at 08:33;  

Start 3/23/20 at 08:30;  Stop 3/23/20 at 08:31;  Status DC


Succinylcholine Chloride (Anectine) 120 mg 1X  ONCE IV  Last administered on 

3/23/20at 08:34;  Start 3/23/20 at 08:30;  Stop 3/23/20 at 08:31;  Status DC


Midazolam HCl (Versed) 5 mg 1X  ONCE IV ;  Start 3/23/20 at 08:30;  Stop 3/23/20

at 08:31;  Status DC


Potassium Chloride 15 meq/ Bicarbonate Dialysis Soln w/ out KCl 5,007.5 ml  @ 

1,000 mls/ hr Q5H1M IV  Last administered on 3/24/20at 11:11;  Start 3/23/20 at 

12:00;  Stop 3/24/20 at 11:15;  Status DC


Potassium Chloride 15 meq/ Bicarbonate Dialysis Soln w/ out KCl 5,007.5 ml  @ 

1,000 mls/ hr Q5H1M IV  Last administered on 3/24/20at 11:12;  Start 3/23/20 at 

12:00;  Stop 3/24/20 at 11:17;  Status DC


Potassium Chloride 15 meq/ Bicarbonate Dialysis Soln w/ out KCl 5,007.5 ml  @ 

1,000 mls/ hr Q5H1M IV  Last administered on 3/24/20at 11:11;  Start 3/23/20 at 

12:00;  Stop 3/24/20 at 11:19;  Status DC


Sodium Chloride 90 meq/Potassium Chloride 15 meq/ Potassium Phosphate 10 mmol/ 

Magnesium Sulfate 10 meq/Calcium Gluconate 20 meq/ Multivitamins 10 ml/Chromium/

Copper/Manganese/ Seleni/Zn 0.5 ml/ Total Parenteral Nutrition/Amino 

Acids/Dextrose/ Fat Emulsion Intravenous 1,400 ml @  58.333 mls/ hr TPN  CONT IV

 Last administered on 3/23/20at 21:42;  Start 3/23/20 at 22:00;  Stop 3/24/20 at

21:59;  Status DC


Heparin Sodium (Porcine) (Heparin Sodium) 5,000 unit Q8HRS SQ  Last administered

on 3/28/20at 05:55;  Start 3/23/20 at 15:00;  Stop 3/28/20 at 13:28;  Status DC


Meropenem 500 mg/ Sodium Chloride 50 ml @  100 mls/hr Q6HRS IV  Last 

administered on 3/25/20at 06:00;  Start 3/24/20 at 09:00;  Stop 3/25/20 at 

07:29;  Status DC


Potassium Phosphate 20 mmol/ Sodium Chloride 106.6667 ml @  51.667 m... 1X  ONCE

IV  Last administered on 3/24/20at 11:22;  Start 3/24/20 at 10:15;  Stop 3/24/20

at 12:18;  Status DC


Acetaminophen (Tylenol Supp) 650 mg PRN Q6HRS  PRN [place] MILD PAIN / TEMP > 100.3'F

Last administered on 5/5/20at 09:12;  Start 3/24/20 at 10:30


Potassium Chloride/Water 100 ml @  100 mls/hr Q1H IV  Last administered on 

3/24/20at 12:12;  Start 3/24/20 at 11:00;  Stop 3/24/20 at 12:59;  Status DC


Potassium Chloride 20 meq/ Bicarbonate Dialysis Soln w/ out KCl 5,010 ml @  

1,000 mls/hr Q5H1M IV  Last administered on 3/25/20at 08:48;  Start 3/24/20 at 

12:00;  Stop 3/25/20 at 13:03;  Status DC


Potassium Chloride 20 meq/ Bicarbonate Dialysis Soln w/ out KCl 5,010 ml @  

1,000 mls/hr Q5H1M IV  Last administered on 3/29/20at 14:52;  Start 3/24/20 at 

11:30;  Stop 3/29/20 at 19:59;  Status DC


Potassium Chloride 20 meq/ Bicarbonate Dialysis Soln w/ out KCl 5,010 ml @  

1,000 mls/hr Q5H1M IV  Last administered on 3/29/20at 14:53;  Start 3/24/20 at 

11:30;  Stop 3/29/20 at 19:59;  Status DC


Sodium Chloride 90 meq/Potassium Chloride 15 meq/ Potassium Phosphate 15 mmol/ 

Magnesium Sulfate 10 meq/Calcium Gluconate 15 meq/ Multivitamins 10 ml/Chromium/

Copper/Manganese/ Seleni/Zn 0.5 ml/ Total Parenteral Nutrition/Amino 

Acids/Dextrose/ Fat Emulsion Intravenous 1,400 ml @  58.333 mls/ hr TPN  CONT IV

 Last administered on 3/24/20at 22:17;  Start 3/24/20 at 22:00;  Stop 3/25/20 at

21:59;  Status DC


Cefepime HCl (Maxipime) 2 gm Q12HR IVP  Last administered on 4/7/20at 20:56;  

Start 3/25/20 at 09:00;  Stop 4/8/20 at 09:58;  Status DC


Daptomycin 500 mg/ Sodium Chloride 50 ml @  100 mls/hr Q48H IV  Last 

administered on 4/10/20at 09:57;  Start 3/25/20 at 08:30;  Stop 4/10/20 at 10:07

;  Status DC


Lidocaine HCl (Buffered Lidocaine 1%) 3 ml 1X  ONCE INJ  Last administered on 

3/25/20at 10:27;  Start 3/25/20 at 10:30;  Stop 3/25/20 at 10:31;  Status DC


Potassium Phosphate 20 mmol/ Sodium Chloride 106.6667 ml @  51.667 m... 1X  ONCE

IV  Last administered on 3/25/20at 12:51;  Start 3/25/20 at 13:00;  Stop 3/25/20

at 15:03;  Status DC


Sodium Chloride 90 meq/Potassium Chloride 15 meq/ Potassium Phosphate 18 mmol/ 

Magnesium Sulfate 8 meq/Calcium Gluconate 15 meq/ Multivitamins 10 ml/Chromium/ 

Copper/Manganese/ Seleni/Zn 0.5 ml/ Total Parenteral Nutrition/Amino 

Acids/Dextrose/ Fat Emulsion Intravenous 1,400 ml @  58.333 mls/ hr TPN  CONT IV

 Last administered on 3/25/20at 22:16;  Start 3/25/20 at 22:00;  Stop 3/26/20 at

21:59;  Status DC


Potassium Chloride 20 meq/ Bicarbonate Dialysis Soln w/ out KCl 5,010 ml @  

1,000 mls/hr Q5H1M IV  Last administered on 3/29/20at 14:54;  Start 3/25/20 at 

16:00;  Stop 3/29/20 at 19:59;  Status DC


Multi-Ingred Cream/Lotion/Oil/ Oint (Artificial Tears Eye Ointment) 1 thomas PRN 

Q1HR  PRN OU DRY EYE, 2nd choice Last administered on 4/13/20at 08:19;  Start 

3/25/20 at 17:30


Sodium Chloride 90 meq/Potassium Chloride 15 meq/ Potassium Phosphate 18 mmol/ 

Magnesium Sulfate 8 meq/Calcium Gluconate 15 meq/ Multivitamins 10 ml/Chromium/ 

Copper/Manganese/ Seleni/Zn 0.5 ml/ Total Parenteral Nutrition/Amino 

Acids/Dextrose/ Fat Emulsion Intravenous 1,400 ml @  58.333 mls/ hr TPN  CONT IV

 Last administered on 3/26/20at 22:00;  Start 3/26/20 at 22:00;  Stop 3/27/20 at

21:59;  Status DC


Albumin Human 500 ml @  125 mls/hr 1X  ONCE IV ;  Start 3/26/20 at 14:15;  Stop 

3/26/20 at 18:14;  Status DC


Sodium Chloride 90 meq/Potassium Chloride 15 meq/ Potassium Phosphate 18 mmol/ 

Magnesium Sulfate 8 meq/Calcium Gluconate 15 meq/ Multivitamins 10 ml/Chromium/ 

Copper/Manganese/ Seleni/Zn 0.5 ml/ Insulin Human Regular 10 unit/ Total 

Parenteral Nutrition/Amino Acids/Dextrose/ Fat Emulsion Intravenous 1,400 ml @  

58.333 mls/ hr TPN  CONT IV  Last administered on 3/27/20at 21:43;  Start 

3/27/20 at 22:00;  Stop 3/28/20 at 21:59;  Status DC


Lidocaine HCl (Buffered Lidocaine 1%) 3 ml STK-MED ONCE .ROUTE ;  Start 3/25/20 

at 10:00;  Stop 3/27/20 at 13:57;  Status DC


Midazolam HCl 100 mg/Sodium Chloride 100 ml @ 7 mls/hr CONT  PRN IV SEE PROTOCOL

Last administered on 4/8/20at 15:35;  Start 3/28/20 at 16:00


Sodium Chloride 90 meq/Potassium Chloride 15 meq/ Potassium Phosphate 18 mmol/ M

agnesium Sulfate 8 meq/Calcium Gluconate 15 meq/ Multivitamins 10 ml/Chromium/ 

Copper/Manganese/ Seleni/Zn 0.5 ml/ Insulin Human Regular 15 unit/ Total 

Parenteral Nutrition/Amino Acids/Dextrose/ Fat Emulsion Intravenous 1,400 ml @  

58.333 mls/ hr TPN  CONT IV  Last administered on 3/28/20at 20:34;  Start 

3/28/20 at 22:00;  Stop 3/29/20 at 21:59;  Status DC


Info ([facility] Electrolyte Protocol) 1 ea CONT PRN  PRN MC PER PROTOCOL;  Start 

3/29/20 at 13:15


Sodium Chloride 90 meq/Potassium Chloride 15 meq/ Potassium Phosphate 18 mmol/ 

Magnesium Sulfate 8 meq/Calcium Gluconate 15 meq/ Multivitamins 10 ml/Chromium/ 

Copper/Manganese/ Seleni/Zn 0.5 ml/ Insulin Human Regular 15 unit/ Total 

Parenteral Nutrition/Amino Acids/Dextrose/ Fat Emulsion Intravenous 1,400 ml @  

58.333 mls/ hr TPN  CONT IV  Last administered on 3/29/20at 22:05;  Start 

3/29/20 at 22:00;  Stop 3/30/20 at 21:59;  Status DC


Potassium Chloride 15 meq/ Bicarbonate Dialysis Soln w/ out KCl 5,007.5 ml  @ 

1,000 mls/ hr Q5H1M IV  Last administered on 4/1/20at 18:14;  Start 3/29/20 at 

20:00;  Stop 4/2/20 at 13:08;  Status DC


Potassium Chloride 15 meq/ Bicarbonate Dialysis Soln w/ out KCl 5,007.5 ml  @ 

1,000 mls/ hr Q5H1M IV  Last administered on 4/1/20at 18:14;  Start 3/29/20 at 

20:00;  Stop 4/2/20 at 13:08;  Status DC


Potassium Chloride 15 meq/ Bicarbonate Dialysis Soln w/ out KCl 5,007.5 ml  @ 

1,000 mls/ hr Q5H1M IV  Last administered on 4/1/20at 18:14;  Start 3/29/20 at 

20:00;  Stop 4/2/20 at 13:08;  Status DC


Iohexol (Omnipaque 240 Mg/ml) 30 ml 1X  ONCE PO  Last administered on 3/30/20at 

11:30;  Start 3/30/20 at 11:30;  Stop 3/30/20 at 11:33;  Status DC


Info (CONTRAST GIVEN -- Rx MONITORING) 1 each PRN DAILY  PRN MC SEE COMMENTS;  

Start 3/30/20 at 11:45;  Stop 4/1/20 at 11:44;  Status DC


Sodium Chloride 90 meq/Potassium Chloride 15 meq/ Potassium Phosphate 18 mmol/ 

Magnesium Sulfate 8 meq/Calcium Gluconate 15 meq/ Multivitamins 10 ml/Chromium/ 

Copper/Manganese/ Seleni/Zn 0.5 ml/ Insulin Human Regular 15 unit/ Total 

Parenteral Nutrition/Amino Acids/Dextrose/ Fat Emulsion Intravenous 1,400 ml @  

58.333 mls/ hr TPN  CONT IV  Last administered on 3/30/20at 21:47;  Start 

3/30/20 at 22:00;  Stop 3/31/20 at 21:59;  Status DC


Sodium Chloride 90 meq/Potassium Chloride 15 meq/ Potassium Phosphate 18 mmol/ 

Magnesium Sulfate 8 meq/Calcium Gluconate 15 meq/ Multivitamins 10 ml/Chromium/ 

Copper/Manganese/ Seleni/Zn 0.5 ml/ Insulin Human Regular 20 unit/ Total 

Parenteral Nutrition/Amino Acids/Dextrose/ Fat Emulsion Intravenous 1,400 ml @  

58.333 mls/ hr TPN  CONT IV  Last administered on 3/31/20at 21:36;  Start 

3/31/20 at 22:00;  Stop 4/1/20 at 21:59;  Status DC


Alteplase, Recombinant (Cathflo For Central Catheter Clearance) 1 mg 1X  ONCE 

INT CAT  Last administered on 3/31/20at 20:03;  Start 3/31/20 at 19:30;  Stop 

3/31/20 at 19:46;  Status DC


Alteplase, Recombinant (Cathflo For Central Catheter Clearance) 1 mg 1X  ONCE 

INT CAT  Last administered on 3/31/20at 22:05;  Start 3/31/20 at 22:00;  Stop 

3/31/20 at 22:01;  Status DC


Sodium Chloride 90 meq/Potassium Chloride 15 meq/ Potassium Phosphate 18 mmol/ 

Magnesium Sulfate 8 meq/Calcium Gluconate 15 meq/ Multivitamins 10 ml/Chromium/ 

Copper/Manganese/ Seleni/Zn 0.5 ml/ Insulin Human Regular 20 unit/ Total 

Parenteral Nutrition/Amino Acids/Dextrose/ Fat Emulsion Intravenous 1,400 ml @  

58.333 mls/ hr TPN  CONT IV  Last administered on 4/1/20at 21:30;  Start 4/1/20 

at 22:00;  Stop 4/2/20 at 21:59;  Status DC


Dexmedetomidine HCl 400 mcg/ Sodium Chloride 100 ml @ 0 mls/hr CONT  PRN IV 

ANXIETY / AGITATION Last administered on 5/18/20at 05:51;  Start 4/2/20 at 08:15


Sodium Chloride 500 ml @  500 mls/hr 1X PRN  PRN IV ELEVATED BP, SEE COMMENTS;  

Start 4/2/20 at 08:15


Atropine Sulfate (ATROPINE 0.5mg SYRINGE) 0.5 mg PRN Q5MIN  PRN IV SEE COMMENTS;

 Start 4/2/20 at 08:15


Furosemide (Lasix) 20 mg 1X  ONCE IVP  Last administered on 4/2/20at 08:19;  

Start 4/2/20 at 08:15;  Stop 4/2/20 at 08:16;  Status DC


Lidocaine HCl (Buffered Lidocaine 1%) 3 ml STK-MED ONCE .ROUTE ;  Start 4/2/20 

at 08:39;  Stop 4/2/20 at 08:39;  Status DC


Lidocaine HCl (Buffered Lidocaine 1%) 6 ml 1X  ONCE INJ  Last administered on 

4/2/20at 09:05;  Start 4/2/20 at 09:00;  Stop 4/2/20 at 09:06;  Status DC


Sodium Chloride 90 meq/Potassium Chloride 15 meq/ Potassium Phosphate 18 mmol/ 

Magnesium Sulfate 8 meq/Calcium Gluconate 15 meq/ Multivitamins 10 ml/Chromium/ 

Copper/Manganese/ Seleni/Zn 0.5 ml/ Insulin Human Regular 20 unit/ Total 

Parenteral Nutrition/Amino Acids/Dextrose/ Fat Emulsion Intravenous 1,400 ml @  

58.333 mls/ hr TPN  CONT IV  Last administered on 4/2/20at 22:45;  Start 4/2/20 

at 22:00;  Stop 4/3/20 at 21:59;  Status DC


Sodium Chloride 1,000 ml @  1,000 mls/hr Q1H PRN IV hypotension;  Start 4/3/20 

at 07:30;  Stop 4/3/20 at 13:29;  Status DC


Albumin Human 200 ml @  200 mls/hr 1X PRN  PRN IV Hypotension Last administered 

on 4/3/20at 09:36;  Start 4/3/20 at 07:30;  Stop 4/3/20 at 13:29;  Status DC


Sodium Chloride (Normal Saline Flush) 10 ml 1X PRN  PRN IV AP catheter pack;  

Start 4/3/20 at 07:30;  Stop 4/3/20 at 21:29;  Status DC


Sodium Chloride (Normal Saline Flush) 10 ml 1X PRN  PRN IV  catheter pack;  

Start 4/3/20 at 07:30;  Stop 4/4/20 at 07:29;  Status DC


Sodium Chloride 1,000 ml @  400 mls/hr Q2H30M PRN IV PATENCY;  Start 4/3/20 at 

07:30;  Stop 4/3/20 at 19:29;  Status DC


Info (PHARMACY MONITORING -- do not chart) 1 each PRN DAILY  PRN MC SEE 

COMMENTS;  Start 4/3/20 at 07:30;  Stop 4/3/20 at 13:02;  Status DC


Info (PHARMACY MONITORING -- do not chart) 1 each PRN DAILY  PRN MC SEE 

COMMENTS;  Start 4/3/20 at 07:30;  Stop 4/5/20 at 12:45;  Status DC


Sodium Chloride 90 meq/Potassium Chloride 15 meq/ Potassium Phosphate 10 mmol/ 

Magnesium Sulfate 8 meq/Calcium Gluconate 15 meq/ Multivitamins 10 ml/Chromium/ 

Copper/Manganese/ Seleni/Zn 0.5 ml/ Insulin Human Regular 25 unit/ Total 

Parenteral Nutrition/Amino Acids/Dextrose/ Fat Emulsion Intravenous 1,400 ml @  

58.333 mls/ hr TPN  CONT IV  Last administered on 4/3/20at 22:19;  Start 4/3/20 

at 22:00;  Stop 4/4/20 at 21:59;  Status DC


Heparin Sodium (Porcine) (Heparin Sodium) 5,000 unit Q12HR SQ  Last administered

on 4/26/20at 08:59;  Start 4/3/20 at 21:00;  Stop 4/26/20 at 10:05;  Status DC


Ondansetron HCl (Zofran) 4 mg PRN Q6HRS  PRN IV NAUSEA/VOMITING;  Start 4/6/20 

at 07:00;  Stop 4/7/20 at 06:59;  Status DC


Fentanyl Citrate (Fentanyl 2ml Vial) 25 mcg PRN Q5MIN  PRN IV MILD PAIN 1-3;  

Start 4/6/20 at 07:00;  Stop 4/7/20 at 06:59;  Status DC


Fentanyl Citrate (Fentanyl 2ml Vial) 50 mcg PRN Q5MIN  PRN IV MODERATE TO SEVERE

PAIN;  Start 4/6/20 at 07:00;  Stop 4/7/20 at 06:59;  Status DC


Ringer's Solution 1,000 ml @  30 mls/hr Q24H IV ;  Start 4/6/20 at 07:00;  Stop 

4/6/20 at 18:59;  Status DC


Lidocaine HCl (Xylocaine-Mpf 1% 2ml Vial) 2 ml PRN 1X  PRN ID PRIOR TO IV START;

 Start 4/6/20 at 07:00;  Stop 4/7/20 at 06:59;  Status DC


Prochlorperazine Edisylate (Compazine) 5 mg PACU PRN  PRN IV NAUSEA, MRX1;  

Start 4/6/20 at 07:00;  Stop 4/7/20 at 06:59;  Status DC


Sodium Chloride 1,000 ml @  1,000 mls/hr Q1H PRN IV hypotension;  Start 4/4/20 

at 09:10;  Stop 4/4/20 at 15:09;  Status DC


Albumin Human 200 ml @  200 mls/hr 1X PRN  PRN IV Hypotension Last administered 

on 4/4/20at 10:10;  Start 4/4/20 at 09:15;  Stop 4/4/20 at 15:14;  Status DC


Sodium Chloride 1,000 ml @  400 mls/hr Q2H30M PRN IV PATENCY;  Start 4/4/20 at 

09:10;  Stop 4/4/20 at 21:09;  Status DC


Info (PHARMACY MONITORING -- do not chart) 1 each PRN DAILY  PRN MC SEE 

COMMENTS;  Start 4/4/20 at 09:15;  Stop 4/5/20 at 12:45;  Status DC


Info (PHARMACY MONITORING -- do not chart) 1 each PRN DAILY  PRN MC SEE 

COMMENTS;  Start 4/4/20 at 09:15;  Stop 4/5/20 at 12:45;  Status DC


Sodium Chloride 90 meq/Potassium Chloride 15 meq/ Potassium Phosphate 10 mmol/ 

Magnesium Sulfate 8 meq/Calcium Gluconate 15 meq/ Multivitamins 10 ml/Chromium/ 

Copper/Manganese/ Seleni/Zn 0.5 ml/ Insulin Human Regular 25 unit/ Total 

Parenteral Nutrition/Amino Acids/Dextrose/ Fat Emulsion Intravenous 1,400 ml @  

58.333 mls/ hr TPN  CONT IV  Last administered on 4/4/20at 22:10;  Start 4/4/20 

at 22:00;  Stop 4/5/20 at 21:59;  Status DC


Magnesium Sulfate 50 ml @ 25 mls/hr PRN DAILY  PRN IV for Mag < 1.7 on am labs 

Last administered on 4/20/20at 17:27;  Start 4/5/20 at 09:15


Sodium Chloride 90 meq/Potassium Chloride 15 meq/ Potassium Phosphate 10 mmol/ 

Magnesium Sulfate 8 meq/Calcium Gluconate 15 meq/ Multivitamins 10 ml/Chromium/ 

Copper/Manganese/ Seleni/Zn 0.5 ml/ Insulin Human Regular 25 unit/ Total 

Parenteral Nutrition/Amino Acids/Dextrose/ Fat Emulsion Intravenous 1,400 ml @  

58.333 mls/ hr TPN  CONT IV  Last administered on 4/5/20at 21:20;  Start 4/5/20 

at 22:00;  Stop 4/6/20 at 21:59;  Status DC


Sodium Chloride 1,000 ml @  1,000 mls/hr Q1H PRN IV hypotension;  Start 4/5/20 

at 12:23;  Stop 4/5/20 at 18:22;  Status DC


Albumin Human 200 ml @  200 mls/hr 1X  ONCE IV  Last administered on 4/5/20at 

13:34;  Start 4/5/20 at 12:30;  Stop 4/5/20 at 13:29;  Status DC


Diphenhydramine HCl (Benadryl) 25 mg 1X PRN  PRN IV ITCHING;  Start 4/5/20 at 

12:30;  Stop 4/6/20 at 12:29;  Status DC


Diphenhydramine HCl (Benadryl) 25 mg 1X PRN  PRN IV ITCHING;  Start 4/5/20 at 

12:30;  Stop 4/6/20 at 12:29;  Status DC


Info (PHARMACY MONITORING -- do not chart) 1 each PRN DAILY  PRN MC SEE 

COMMENTS;  Start 4/5/20 at 12:30;  Status Cancel


Bupivacaine HCl/ Epinephrine Bitart (Sensorcain-Epi 0.5%-1:[id number] Mpf) 30 ml 

STK-MED ONCE .ROUTE  Last administered on 4/6/20at 11:44;  Start 4/6/20 at 

11:00;  Stop 4/6/20 at 11:01;  Status DC


Cellulose (Surgicel Fibrillar 1x2) 1 each STK-MED ONCE .ROUTE ;  Start 4/6/20 at

11:00;  Stop 4/6/20 at 11:01;  Status DC


Sodium Chloride 90 meq/Potassium Chloride 15 meq/ Potassium Phosphate 10 mmol/ 

Magnesium Sulfate 12 meq/Calcium Gluconate 15 meq/ Multivitamins 10 ml/Chromium/

Copper/Manganese/ Seleni/Zn 0.5 ml/ Insulin Human Regular 25 unit/ Total Par

enteral Nutrition/Amino Acids/Dextrose/ Fat Emulsion Intravenous 1,400 ml @  

58.333 mls/ hr TPN  CONT IV  Last administered on 4/6/20at 22:24;  Start 4/6/20 

at 22:00;  Stop 4/7/20 at 21:59;  Status DC


Propofol 20 ml @ As Directed STK-MED ONCE IV ;  Start 4/6/20 at 11:07;  Stop 

4/6/20 at 11:07;  Status DC


Cellulose (Surgicel Hemostat 4x8) 1 each STK-MED ONCE .ROUTE  Last administered 

on 4/6/20at 11:44;  Start 4/6/20 at 11:55;  Stop 4/6/20 at 11:56;  Status DC


Sevoflurane (Ultane) 60 ml STK-MED ONCE IH ;  Start 4/6/20 at 12:46;  Stop 

4/6/20 at 12:46;  Status DC


Sodium Chloride 1,000 ml @  1,000 mls/hr Q1H PRN IV hypotension;  Start 4/6/20 

at 13:51;  Stop 4/6/20 at 19:50;  Status DC


Albumin Human 200 ml @  200 mls/hr 1X PRN  PRN IV Hypotension Last administered 

on 4/6/20at 14:51;  Start 4/6/20 at 14:00;  Stop 4/6/20 at 19:59;  Status DC


Diphenhydramine HCl (Benadryl) 25 mg 1X PRN  PRN IV ITCHING;  Start 4/6/20 at 

14:00;  Stop 4/7/20 at 13:59;  Status DC


Diphenhydramine HCl (Benadryl) 25 mg 1X PRN  PRN IV ITCHING;  Start 4/6/20 at 

14:00;  Stop 4/7/20 at 13:59;  Status DC


Sodium Chloride 1,000 ml @  400 mls/hr Q2H30M PRN IV PATENCY;  Start 4/6/20 at 

13:51;  Stop 4/7/20 at 01:50;  Status DC


Info (PHARMACY MONITORING -- do not chart) 1 each PRN DAILY  PRN MC SEE COMM

ENTS;  Start 4/6/20 at 14:00;  Stop 4/9/20 at 08:16;  Status DC


Heparin Sodium (Porcine) (Hep Lock Adult) 500 unit STK-MED ONCE IVP ;  Start 

4/7/20 at 09:29;  Stop 4/7/20 at 09:30;  Status DC


Sodium Chloride 1,000 ml @  1,000 mls/hr Q1H PRN IV hypotension;  Start 4/7/20 

at 10:43;  Stop 4/7/20 at 16:42;  Status DC


Sodium Chloride 1,000 ml @  400 mls/hr Q2H30M PRN IV PATENCY;  Start 4/7/20 at 

10:43;  Stop 4/7/20 at 22:42;  Status DC


Info (PHARMACY MONITORING -- do not chart) 1 each PRN DAILY  PRN MC SEE VITO

NTS;  Start 4/7/20 at 10:45;  Status UNV


Info (PHARMACY MONITORING -- do not chart) 1 each PRN DAILY  PRN MC SEE 

COMMENTS;  Start 4/7/20 at 10:45;  Status UNV


Sodium Chloride 90 meq/Potassium Chloride 15 meq/ Magnesium Sulfate 12 

meq/Calcium Gluconate 15 meq/ Multivitamins 10 ml/Chromium/ Copper/Manganese/ 

Seleni/Zn 0.5 ml/ Insulin Human Regular 25 unit/ Total Parenteral Nutrition/

Amino Acids/Dextrose/ Fat Emulsion Intravenous 1,400 ml @  58.333 mls/ hr TPN  

CONT IV  Last administered on 4/7/20at 22:13;  Start 4/7/20 at 22:00;  Stop 

4/8/20 at 21:59;  Status DC


Sodium Chloride 1,000 ml @  1,000 mls/hr Q1H PRN IV hypotension;  Start 4/8/20 

at 07:50;  Stop 4/8/20 at 13:49;  Status DC


Albumin Human 200 ml @  200 mls/hr 1X  ONCE IV ;  Start 4/8/20 at 08:00;  Stop 

4/8/20 at 08:53;  Status DC


Diphenhydramine HCl (Benadryl) 25 mg 1X PRN  PRN IV ITCHING;  Start 4/8/20 at 

08:00;  Stop 4/9/20 at 07:59;  Status DC


Diphenhydramine HCl (Benadryl) 25 mg 1X PRN  PRN IV ITCHING;  Start 4/8/20 at 

08:00;  Stop 4/9/20 at 07:59;  Status DC


Info (PHARMACY MONITORING -- do not chart) 1 each PRN DAILY  PRN [facility] SEE 

COMMENTS;  Start 4/8/20 at 08:00;  Stop 4/9/20 at 08:16;  Status DC


Albumin Human 50 ml @ 50 mls/hr 1X  ONCE IV ;  Start 4/8/20 at 08:53;  Stop 

4/8/20 at 08:56;  Status DC


Albumin Human 200 ml @  50 mls/hr PRN 1X  PRN IV HYPOTENSION Last administered 

on 4/14/20at 11:54;  Start 4/8/20 at 09:00


Meropenem 500 mg/ Sodium Chloride 50 ml @  100 mls/hr Q12H IV  Last administered

on 4/28/20at 10:45;  Start 4/8/20 at 10:00;  Stop 4/28/20 at 12:37;  Status DC


Sodium Chloride 90 meq/Magnesium Sulfate 12 meq/ Calcium Gluconate 15 meq/ 

Multivitamins 10 ml/Chromium/ Copper/Manganese/ Seleni/Zn 0.5 ml/ Insulin Human 

Regular 25 unit/ Total Parenteral Nutrition/Amino Acids/Dextrose/ Fat Emulsion 

Intravenous 1,400 ml @  58.333 mls/ hr TPN  CONT IV  Last administered on 

4/8/20at 21:41;  Start 4/8/20 at 22:00;  Stop 4/9/20 at 21:59;  Status DC


Sodium Chloride 1,000 ml @  1,000 mls/hr Q1H PRN IV hypotension;  Start 4/9/20 

at 07:58;  Stop 4/9/20 at 13:57;  Status DC


Albumin Human 200 ml @  200 mls/hr 1X PRN  PRN IV Hypotension Last administered 

on 4/9/20at 09:30;  Start 4/9/20 at 08:00;  Stop 4/9/20 at 13:59;  Status DC


Sodium Chloride 1,000 ml @  400 mls/hr Q2H30M PRN IV PATENCY;  Start 4/9/20 at 

07:58;  Stop 4/9/20 at 19:57;  Status DC


Info (PHARMACY MONITORING -- do not chart) 1 each PRN DAILY  PRN [facility] SEE 

COMMENTS;  Start 4/9/20 at 08:00;  Status Cancel


Info (PHARMACY MONITORING -- do not chart) 1 each PRN DAILY  PRN [facility] SEE 

COMMENTS;  Start 4/9/20 at 08:15;  Status UNV


Sodium Chloride 90 meq/Potassium Phosphate 5 mmol/ Magnesium Sulfate 12 

meq/Calcium Gluconate 15 meq/ Multivitamins 10 ml/Chromium/ Copper/Manganese/ 

Seleni/Zn 0.5 ml/ Insulin Human Regular 30 unit/ Total Parenteral 

Nutrition/Amino Acids/Dextrose/ Fat Emulsion Intravenous 1,400 ml @  58.333 mls/

hr TPN  CONT IV  Last administered on 4/9/20at 22:08;  Start 4/9/20 at 22:00;  

Stop 4/10/20 at 21:59;  Status DC


Linezolid/Dextrose 300 ml @  300 mls/hr Q12HR IV  Last administered on 4/20/20at

20:40;  Start 4/10/20 at 11:00;  Stop 4/21/20 at 08:10;  Status DC


Sodium Chloride 90 meq/Potassium Phosphate 15 mmol/ Magnesium Sulfate 12 

meq/Calcium Gluconate 15 meq/ Multivitamins 10 ml/Chromium/ Copper/Manganese/ 

Seleni/Zn 0.5 ml/ Insulin Human Regular 30 unit/ Total Parenteral 

Nutrition/Amino Acids/Dextrose/ Fat Emulsion Intravenous 1,400 ml @  58.333 mls/

hr TPN  CONT IV  Last administered on 4/10/20at 21:49;  Start 4/10/20 at 22:00; 

Stop 4/11/20 at 21:59;  Status DC


Sodium Chloride 90 meq/Potassium Phosphate 15 mmol/ Magnesium Sulfate 12 

meq/Calcium Gluconate 15 meq/ Multivitamins 10 ml/Chromium/ Copper/Manganese/ 

Seleni/Zn 0.5 ml/ Insulin Human Regular 40 unit/ Total Parenteral 

Nutrition/Amino Acids/Dextrose/ Fat Emulsion Intravenous 1,400 ml @  58.333 mls/

hr TPN  CONT IV  Last administered on 4/11/20at 21:21;  Start 4/11/20 at 22:00; 

Stop 4/12/20 at 21:59;  Status DC


Sodium Chloride 1,000 ml @  1,000 mls/hr Q1H PRN IV hypotension;  Start 4/11/20 

at 13:26;  Stop 4/11/20 at 19:25;  Status DC


Albumin Human 200 ml @  200 mls/hr 1X PRN  PRN IV Hypotension Last administered 

on 4/11/20at 15:00;  Start 4/11/20 at 13:30;  Stop 4/11/20 at 19:29;  Status DC


Sodium Chloride (Normal Saline Flush) 10 ml 1X PRN  PRN IV AP catheter pack;  

Start 4/11/20 at 13:30;  Stop 4/12/20 at 13:29;  Status DC


Sodium Chloride (Normal Saline Flush) 10 ml 1X PRN  PRN IV  catheter pack;  

Start 4/11/20 at 13:30;  Stop 4/12/20 at 13:29;  Status DC


Sodium Chloride 1,000 ml @  400 mls/hr Q2H30M PRN IV PATENCY;  Start 4/11/20 at 

13:26;  Stop 4/12/20 at 01:25;  Status DC


Info (PHARMACY MONITORING -- do not chart) 1 each PRN DAILY  PRN [facility] SEE 

COMMENTS;  Start 4/11/20 at 13:30;  Stop 4/11/20 at 13:33;  Status DC


Info (PHARMACY MONITORING -- do not chart) 1 each PRN DAILY  PRN [facility] SEE COM

MENTS;  Start 4/11/20 at 13:30;  Stop 4/11/20 at 13:34;  Status DC


Sodium Chloride 90 meq/Potassium Phosphate 19 mmol/ Magnesium Sulfate 12 

meq/Calcium Gluconate 15 meq/ Multivitamins 10 ml/Chromium/ Copper/Manganese/ 

Seleni/Zn 0.5 ml/ Insulin Human Regular 40 unit/ Total Parenteral 

Nutrition/Amino Acids/Dextrose/ Fat Emulsion Intravenous 1,400 ml @  58.333 mls/

hr TPN  CONT IV  Last administered on 4/12/20at 21:54;  Start 4/12/20 at 22:00; 

Stop 4/13/20 at 21:59;  Status DC


Sodium Chloride 1,000 ml @  1,000 mls/hr Q1H PRN IV hypotension;  Start 4/13/20 

at 09:35;  Stop 4/13/20 at 15:34;  Status DC


Albumin Human 200 ml @  200 mls/hr 1X PRN  PRN IV Hypotension;  Start 4/13/20 at

09:45;  Stop 4/13/20 at 15:44;  Status DC


Diphenhydramine HCl (Benadryl) 25 mg 1X PRN  PRN IV ITCHING;  Start 4/13/20 at 

09:45;  Stop 4/14/20 at 09:44;  Status DC


Diphenhydramine HCl (Benadryl) 25 mg 1X PRN  PRN IV ITCHING;  Start 4/13/20 at 

09:45;  Stop 4/14/20 at 09:44;  Status DC


Sodium Chloride 1,000 ml @  400 mls/hr Q2H30M PRN IV PATENCY;  Start 4/13/20 at 

09:35;  Stop 4/13/20 at 21:34;  Status DC


Info (PHARMACY MONITORING -- do not chart) 1 each PRN DAILY  PRN MC SEE 

COMMENTS;  Start 4/13/20 at 09:45;  Status Cancel


Sodium Chloride 100 meq/Potassium Phosphate 19 mmol/ Magnesium Sulfate 12 

meq/Calcium Gluconate 15 meq/ Multivitamins 10 ml/Chromium/ Copper/Manganese/ 

Seleni/Zn 0.5 ml/ Insulin Human Regular 40 unit/ Potassium Chloride 20 meq/ 

Total Parenteral Nutrition/Amino Acids/Dextrose/ Fat Emulsion Intravenous 1,400 

ml @  58.333 mls/ hr TPN  CONT IV  Last administered on 4/13/20at 22:02;  Start 

4/13/20 at 22:00;  Stop 4/14/20 at 21:59;  Status DC


Furosemide (Lasix) 40 mg 1X  ONCE IVP  Last administered on 4/13/20at 14:39;  

Start 4/13/20 at 14:30;  Stop 4/13/20 at 14:31;  Status DC


Metronidazole 100 ml @  100 mls/hr Q8HRS IV  Last administered on 4/21/20at 

06:04;  Start 4/14/20 at 10:00;  Stop 4/21/20 at 08:10;  Status DC


Sodium Chloride 1,000 ml @  1,000 mls/hr Q1H PRN IV hypotension;  Start 4/14/20 

at 08:00;  Stop 4/14/20 at 13:59;  Status DC


Albumin Human 200 ml @  200 mls/hr 1X PRN  PRN IV Hypotension;  Start 4/14/20 at

08:00;  Stop 4/14/20 at 13:59;  Status DC


Sodium Chloride 1,000 ml @  400 mls/hr Q2H30M PRN IV PATENCY;  Start 4/14/20 at 

08:00;  Stop 4/14/20 at 19:59;  Status DC


Info (PHARMACY MONITORING -- do not chart) 1 each PRN DAILY  PRN MC SEE 

COMMENTS;  Start 4/14/20 at 11:30;  Status UNV


Info (PHARMACY MONITORING -- do not chart) 1 each PRN DAILY  PRN MC SEE 

COMMENTS;  Start 4/14/20 at 11:30;  Stop 4/16/20 at 12:13;  Status DC


Sodium Chloride 100 meq/Potassium Phosphate 19 mmol/ Magnesium Sulfate 12 

meq/Calcium Gluconate 15 meq/ Multivitamins 10 ml/Chromium/ Copper/Manganese/ 

Seleni/Zn 0.5 ml/ Insulin Human Regular 40 unit/ Potassium Chloride 20 meq/ 

Total Parenteral Nutrition/Amino Acids/Dextrose/ Fat Emulsion Intravenous 1,400 

ml @  58.333 mls/ hr TPN  CONT IV  Last administered on 4/14/20at 21:52;  Start 

4/14/20 at 22:00;  Stop 4/15/20 at 21:59;  Status DC


Sodium Chloride (Normal Saline Flush) 10 ml QSHIFT  PRN IV AFTER MEDS AND BLOOD 

DRAWS;  Start 4/14/20 at 15:00;  Stop 5/12/20 at 11:27;  Status DC


Sodium Chloride (Normal Saline Flush) 10 ml PRN Q5MIN  PRN IV AFTER MEDS AND 

BLOOD DRAWS;  Start 4/14/20 at 15:00


Sodium Chloride (Normal Saline Flush) 20 ml PRN Q5MIN  PRN IV AFTER MEDS AND 

BLOOD DRAWS;  Start 4/14/20 at 15:00


Sodium Chloride 100 meq/Potassium Phosphate 19 mmol/ Magnesium Sulfate 12 

meq/Calcium Gluconate 15 meq/ Multivitamins 10 ml/Chromium/ Copper/Manganese/ 

Seleni/Zn 0.5 ml/ Insulin Human Regular 40 unit/ Potassium Chloride 20 meq/ 

Total Parenteral Nutrition/Amino Acids/Dextrose/ Fat Emulsion Intravenous 1,400 

ml @  58.333 mls/ hr TPN  CONT IV  Last administered on 4/15/20at 21:20;  Start 

4/15/20 at 22:00;  Stop 4/16/20 at 21:59;  Status DC


Lidocaine HCl (Buffered Lidocaine 1%) 3 ml STK-MED ONCE .ROUTE ;  Start 4/15/20 

at 13:16;  Stop 4/15/20 at 13:16;  Status DC


Lidocaine HCl (Buffered Lidocaine 1%) 6 ml 1X  ONCE INJ  Last administered on 

4/15/20at 13:45;  Start 4/15/20 at 13:30;  Stop 4/15/20 at 13:31;  Status DC


Albumin Human 100 ml @  100 mls/hr 1X  ONCE IV  Last administered on 4/15/20at 

15:41;  Start 4/15/20 at 15:00;  Stop 4/15/20 at 15:59;  Status DC


Albumin Human 50 ml @ 50 mls/hr 1X  ONCE IV  Last administered on 4/15/20at 

15:00;  Start 4/15/20 at 15:00;  Stop 4/15/20 at 15:59;  Status DC


Info (PHARMACY MONITORING -- do not chart) 1 each PRN DAILY  PRN MC SEE 

COMMENTS;  Start 4/16/20 at 11:30;  Status Cancel


Info (PHARMACY MONITORING -- do not chart) 1 each PRN DAILY  PRN MC SEE 

COMMENTS;  Start 4/16/20 at 11:30;  Status UNV


Sodium Chloride 100 meq/Potassium Phosphate 10 mmol/ Magnesium Sulfate 12 

meq/Calcium Gluconate 15 meq/ Multivitamins 10 ml/Chromium/ Copper/Manganese/ 

Seleni/Zn 0.5 ml/ Insulin Human Regular 35 unit/ Potassium Chloride 20 meq/ Tot

al Parenteral Nutrition/Amino Acids/Dextrose/ Fat Emulsion Intravenous 1,400 ml 

@  58.333 mls/ hr TPN  CONT IV  Last administered on 4/16/20at 22:10;  Start 

4/16/20 at 22:00;  Stop 4/17/20 at 21:59;  Status DC


Sodium Chloride 100 meq/Potassium Phosphate 5 mmol/ Magnesium Sulfate 12 

meq/Calcium Gluconate 15 meq/ Multivitamins 10 ml/Chromium/ Copper/Manganese/ 

Seleni/Zn 0.5 ml/ Insulin Human Regular 35 unit/ Potassium Chloride 20 meq/ 

Total Parenteral Nutrition/Amino Acids/Dextrose/ Fat Emulsion Intravenous 1,400 

ml @  58.333 mls/ hr TPN  CONT IV  Last administered on 4/17/20at 22:59;  Start 

4/17/20 at 22:00;  Stop 4/18/20 at 21:59;  Status DC


Sodium Chloride 1,000 ml @  1,000 mls/hr Q1H PRN IV hypotension;  Start 4/18/20 

at 08:27;  Stop 4/18/20 at 14:26;  Status DC


Albumin Human 200 ml @  200 mls/hr 1X PRN  PRN IV Hypotension Last administered 

on 4/18/20at 09:18;  Start 4/18/20 at 08:30;  Stop 4/18/20 at 14:29;  Status DC


Sodium Chloride 1,000 ml @  400 mls/hr Q2H30M PRN IV PATENCY;  Start 4/18/20 at 

08:27;  Stop 4/18/20 at 20:26;  Status DC


Info (PHARMACY MONITORING -- do not chart) 1 each PRN DAILY  PRN MC SEE 

COMMENTS;  Start 4/18/20 at 08:30;  Status Cancel


Info (PHARMACY MONITORING -- do not chart) 1 each PRN DAILY  PRN MC SEE 

COMMENTS;  Start 4/18/20 at 08:30;  Stop 4/26/20 at 13:10;  Status DC


Sodium Chloride 100 meq/Potassium Chloride 40 meq/ Magnesium Sulfate 15 

meq/Calcium Gluconate 15 meq/ Multivitamins 10 ml/Chromium/ Copper/Manganese/ 

Seleni/Zn 0.5 ml/ Insulin Human Regular 35 unit/ Total Parenteral 

Nutrition/Amino Acids/Dextrose/ Fat Emulsion Intravenous 1,400 ml @  58.333 mls/

hr TPN  CONT IV  Last administered on 4/18/20at 22:00;  Start 4/18/20 at 22:00; 

Stop 4/19/20 at 21:59;  Status DC


Potassium Chloride/Water 100 ml @  100 mls/hr 1X  ONCE IV  Last administered on 

4/18/20at 17:28;  Start 4/18/20 at 14:45;  Stop 4/18/20 at 15:44;  Status DC


Sodium Chloride 100 meq/Potassium Chloride 40 meq/ Magnesium Sulfate 15 

meq/Calcium Gluconate 15 meq/ Multivitamins 10 ml/Chromium/ Copper/Manganese/ 

Seleni/Zn 0.5 ml/ Insulin Human Regular 35 unit/ Total Parenteral 

Nutrition/Amino Acids/Dextrose/ Fat Emulsion Intravenous 1,400 ml @  58.333 mls/

hr TPN  CONT IV  Last administered on 4/19/20at 22:46;  Start 4/19/20 at 22:00; 

Stop 4/20/20 at 21:59;  Status DC


Sodium Chloride 100 meq/Potassium Chloride 40 meq/ Magnesium Sulfate 20 

meq/Calcium Gluconate 15 meq/ Multivitamins 10 ml/Chromium/ Copper/Manganese/ 

Seleni/Zn 0.5 ml/ Insulin Human Regular 35 unit/ Total Parenteral 

Nutrition/Amino Acids/Dextrose/ Fat Emulsion Intravenous 1,400 ml @  58.333 mls/

hr TPN  CONT IV  Last administered on 4/20/20at 22:31;  Start 4/20/20 at 22:00; 

Stop 4/21/20 at 21:59;  Status DC


Fentanyl Citrate (Fentanyl 2ml Vial) 50 mcg PRN Q2HR  PRN IVP PAIN Last 

administered on 4/27/20at 13:32;  Start 4/20/20 at 21:00;  Stop 4/28/20 at 

12:53;  Status DC


Fentanyl Citrate (Fentanyl 2ml Vial) 25 mcg PRN Q2HR  PRN IVP PAIN;  Start 

4/20/20 at 21:00;  Stop 4/28/20 at 12:54;  Status DC


Enoxaparin Sodium (Lovenox 100mg Syringe) 100 mg Q12HR SQ ;  Start 4/21/20 at 

21:00;  Status UNV


Amino Acids/ Glycerin/ Electrolytes 1,000 ml @  75 mls/hr [id number] IV ;  Start 

4/20/20 at 21:15;  Status UNV


Sodium Chloride 1,000 ml @  1,000 mls/hr Q1H PRN IV hypotension;  Start 4/21/20 

at 07:56;  Stop 4/21/20 at 13:55;  Status DC


Albumin Human 200 ml @  200 mls/hr 1X PRN  PRN IV Hypotension Last administered 

on 4/21/20at 08:40;  Start 4/21/20 at 08:00;  Stop 4/21/20 at 13:59;  Status DC


Sodium Chloride 1,000 ml @  400 mls/hr Q2H30M PRN IV PATENCY;  Start 4/21/20 at 

07:56;  Stop 4/21/20 at 19:55;  Status DC


Info (PHARMACY MONITORING -- do not chart) 1 each PRN DAILY  PRN MC SEE 

COMMENTS;  Start 4/21/20 at 08:00;  Status UNV


Info (PHARMACY MONITORING -- do not chart) 1 each PRN DAILY  PRN MC SEE 

COMMENTS;  Start 4/21/20 at 08:00;  Status UNV


Daptomycin 430 mg/ Sodium Chloride 50 ml @  100 mls/hr Q24H IV  Last 

administered on 4/21/20at 12:35;  Start 4/21/20 at 09:00;  Stop 4/21/20 at 

12:49;  Status DC


Sodium Chloride 100 meq/Potassium Chloride 40 meq/ Magnesium Sulfate 20 meq/C

alcium Gluconate 15 meq/ Multivitamins 10 ml/Chromium/ Copper/Manganese/ 

Seleni/Zn 0.5 ml/ Insulin Human Regular 35 unit/ Total Parenteral 

Nutrition/Amino Acids/Dextrose/ Fat Emulsion Intravenous 1,400 ml @  58.333 mls/

hr TPN  CONT IV  Last administered on 4/21/20at 21:26;  Start 4/21/20 at 22:00; 

Stop 4/22/20 at 21:59;  Status DC


Daptomycin 430 mg/ Sodium Chloride 50 ml @  100 mls/hr Q48H IV ;  Start 4/23/20 

at 09:00;  Stop 4/22/20 at 11:55;  Status DC


Sodium Chloride 100 meq/Potassium Chloride 40 meq/ Magnesium Sulfate 20 

meq/Calcium Gluconate 15 meq/ Multivitamins 10 ml/Chromium/ Copper/Manganese/ 

Seleni/Zn 0.5 ml/ Insulin Human Regular 35 unit/ Total Parenteral 

Nutrition/Amino Acids/Dextrose/ Fat Emulsion Intravenous 1,400 ml @  58.333 mls/

hr TPN  CONT IV  Last administered on 4/22/20at 22:27;  Start 4/22/20 at 22:00; 

Stop 4/23/20 at 21:59;  Status DC


Daptomycin 430 mg/ Sodium Chloride 50 ml @  100 mls/hr Q24H IV  Last 

administered on 4/24/20at 15:07;  Start 4/22/20 at 13:00;  Stop 4/25/20 at 

13:15;  Status DC


Sodium Chloride 100 meq/Potassium Chloride 40 meq/ Magnesium Sulfate 20 

meq/Calcium Gluconate 10 meq/ Multivitamins 10 ml/Chromium/ Copper/Manganese/ 

Seleni/Zn 0.5 ml/ Insulin Human Regular 35 unit/ Total Parenteral 

Nutrition/Amino Acids/Dextrose/ Fat Emulsion Intravenous 1,400 ml @  58.333 mls/

hr TPN  CONT IV  Last administered on 4/24/20at 00:06;  Start 4/23/20 at 22:00; 

Stop 4/24/20 at 21:59;  Status DC


Alteplase, Recombinant (Cathflo For Central Catheter Clearance) 1 mg 1X  ONCE 

INT CAT  Last administered on 4/24/20at 11:44;  Start 4/24/20 at 10:45;  Stop 

4/24/20 at 10:46;  Status DC


Ondansetron HCl (Zofran) 4 mg PRN Q6HRS  PRN IV NAUSEA/VOMITING;  Start 4/27/20 

at 07:00;  Stop 4/28/20 at 06:59;  Status DC


Fentanyl Citrate (Fentanyl 2ml Vial) 25 mcg PRN Q5MIN  PRN IV MILD PAIN 1-3;  

Start 4/27/20 at 07:00;  Stop 4/28/20 at 06:59;  Status DC


Fentanyl Citrate (Fentanyl 2ml Vial) 50 mcg PRN Q5MIN  PRN IV MODERATE TO SEVERE

PAIN Last administered on 4/27/20at 10:17;  Start 4/27/20 at 07:00;  Stop 

4/28/20 at 06:59;  Status DC


Ringer's Solution 1,000 ml @  30 mls/hr Q24H IV ;  Start 4/27/20 at 07:00;  Stop

4/27/20 at 18:59;  Status DC


Lidocaine HCl (Xylocaine-Mpf 1% 2ml Vial) 2 ml PRN 1X  PRN ID PRIOR TO IV START;

 Start 4/27/20 at 07:00;  Stop 4/28/20 at 06:59;  Status DC


Prochlorperazine Edisylate (Compazine) 5 mg PACU PRN  PRN IV NAUSEA, MRX1;  

Start 4/27/20 at 07:00;  Stop 4/28/20 at 06:59;  Status DC


Sodium Acetate 50 meq/Potassium Acetate 55 meq/ Magnesium Sulfate 20 meq/Calcium

Gluconate 10 meq/ Multivitamins 10 ml/Chromium/ Copper/Manganese/ Seleni/Zn 0.5 

ml/ Insulin Human Regular 35 unit/ Total Parenteral Nutrition/Amino 

Acids/Dextrose/ Fat Emulsion Intravenous 1,400 ml @  58.333 mls/ hr TPN  CONT IV

;  Start 4/24/20 at 22:00;  Stop 4/24/20 at 14:15;  Status DC


Sodium Acetate 50 meq/Potassium Acetate 55 meq/ Magnesium Sulfate 20 meq/Calcium

Gluconate 10 meq/ Multivitamins 10 ml/Chromium/ Copper/Manganese/ Seleni/Zn 0.5 

ml/ Insulin Human Regular 35 unit/ Total Parenteral Nutrition/Amino Acids/De

xtrose/ Fat Emulsion Intravenous 1,800 ml @  75 mls/hr TPN  CONT IV  Last 

administered on 4/24/20at 22:38;  Start 4/24/20 at 22:00;  Stop 4/25/20 at 

21:59;  Status DC


Sodium Chloride 1,000 ml @  1,000 mls/hr Q1H PRN IV hypotension;  Start 4/24/20 

at 15:31;  Stop 4/24/20 at 21:30;  Status DC


Diphenhydramine HCl (Benadryl) 25 mg 1X PRN  PRN IV ITCHING;  Start 4/24/20 at 

15:45;  Stop 4/25/20 at 15:44;  Status DC


Diphenhydramine HCl (Benadryl) 25 mg 1X PRN  PRN IV ITCHING;  Start 4/24/20 at 

15:45;  Stop 4/25/20 at 15:44;  Status DC


Sodium Chloride 1,000 ml @  400 mls/hr Q2H30M PRN IV PATENCY;  Start 4/24/20 at 

15:31;  Stop 4/25/20 at 03:30;  Status DC


Info (PHARMACY MONITORING -- do not chart) 1 each PRN DAILY  PRN MC SEE 

COMMENTS;  Start 4/24/20 at 15:45


Sodium Acetate 50 meq/Potassium Acetate 55 meq/ Magnesium Sulfate 20 meq/Calcium

Gluconate 10 meq/ Multivitamins 10 ml/Chromium/ Copper/Manganese/ Seleni/Zn 0.5 

ml/ Insulin Human Regular 35 unit/ Total Parenteral Nutrition/Amino 

Acids/Dextrose/ Fat Emulsion Intravenous 1,800 ml @  75 mls/hr TPN  CONT IV  

Last administered on 4/25/20at 22:03;  Start 4/25/20 at 22:00;  Stop 4/26/20 at 

21:59;  Status DC


Daptomycin 430 mg/ Sodium Chloride 50 ml @  100 mls/hr Q24H IV  Last 

administered on 4/30/20at 13:00;  Start 4/25/20 at 13:00;  Stop 4/30/20 at 

20:58;  Status DC


Heparin Sodium (Porcine) 1000 unit/Sodium Chloride 1,001 ml @  1,001 mls/hr 1X  

ONCE IRR ;  Start 4/27/20 at 06:00;  Stop 4/27/20 at 06:59;  Status DC


Potassium Acetate 55 meq/Magnesium Sulfate 20 meq/ Calcium Gluconate 10 meq/ 

Multivitamins 10 ml/Chromium/ Copper/Manganese/ Seleni/Zn 0.5 ml/ Insulin Human 

Regular 35 unit/ Total Parenteral Nutrition/Amino Acids/Dextrose/ Fat Emulsion 

Intravenous 1,920 ml @  80 mls/hr TPN  CONT IV  Last administered on 4/26/20at 

22:10;  Start 4/26/20 at 22:00;  Stop 4/27/20 at 21:59;  Status DC


Dexamethasone Sodium Phosphate (Decadron) 4 mg STK-MED ONCE .ROUTE ;  Start 

4/27/20 at 10:56;  Stop 4/27/20 at 10:57;  Status DC


Ondansetron HCl (Zofran) 4 mg STK-MED ONCE .ROUTE ;  Start 4/27/20 at 10:56;  

Stop 4/27/20 at 10:57;  Status DC


Rocuronium Bromide (Zemuron) 50 mg STK-MED ONCE .ROUTE ;  Start 4/27/20 at 

10:56;  Stop 4/27/20 at 10:57;  Status DC


Fentanyl Citrate (Fentanyl 2ml Vial) 100 mcg STK-MED ONCE .ROUTE ;  Start 

4/27/20 at 10:56;  Stop 4/27/20 at 10:57;  Status DC


Bupivacaine HCl/ Epinephrine Bitart (Sensorcain-Epi 0.5%-1:970369 Mpf) 30 ml 

STK-MED ONCE .ROUTE  Last administered on 4/27/20at 12:01;  Start 4/27/20 at 

10:58;  Stop 4/27/20 at 10:58;  Status DC


Cellulose (Surgicel Hemostat 2x14) 1 each STK-MED ONCE .ROUTE ;  Start 4/27/20 

at 10:58;  Stop 4/27/20 at 10:59;  Status DC


Iohexol (Omnipaque 300 Mg/ml) 50 ml STK-MED ONCE .ROUTE ;  Start 4/27/20 at 

10:58;  Stop 4/27/20 at 10:59;  Status DC


Cellulose (Surgicel Hemostat 4x8) 1 each STK-MED ONCE .ROUTE ;  Start 4/27/20 at

10:58;  Stop 4/27/20 at 10:59;  Status DC


Bisacodyl (Dulcolax Supp) 10 mg STK-MED ONCE .ROUTE ;  Start 4/27/20 at 10:59;  

Stop 4/27/20 at 10:59;  Status DC


Heparin Sodium (Porcine) 1000 unit/Sodium Chloride 1,001 ml @  1,001 mls/hr 1X  

ONCE IRR ;  Start 4/27/20 at 12:00;  Stop 4/27/20 at 12:59;  Status DC


Propofol 20 ml @ As Directed STK-MED ONCE IV ;  Start 4/27/20 at 11:05;  Stop 

4/27/20 at 11:05;  Status DC


Sevoflurane (Ultane) 90 ml STK-MED ONCE IH ;  Start 4/27/20 at 11:05;  Stop 

4/27/20 at 11:05;  Status DC


Sevoflurane (Ultane) 60 ml STK-MED ONCE IH ;  Start 4/27/20 at 12:26;  Stop 

4/27/20 at 12:27;  Status DC


Propofol 20 ml @ As Directed STK-MED ONCE IV ;  Start 4/27/20 at 12:26;  Stop 

4/27/20 at 12:27;  Status DC


Phenylephrine HCl (PHENYLEPHRINE in 0.9% NACL PF) 1 mg STK-MED ONCE IV ;  Start 

4/27/20 at 12:34;  Stop 4/27/20 at 12:34;  Status DC


Heparin Sodium (Porcine) (Heparin Sodium) 5,000 unit Q12HR SQ  Last administered

on 5/6/20at 20:57;  Start 4/27/20 at 21:00;  Stop 5/7/20 at 09:59;  Status DC


Sodium Chloride (Normal Saline Flush) 3 ml QSHIFT  PRN IV AFTER MEDS AND BLOOD D

RAWS;  Start 4/27/20 at 13:45


Naloxone HCl (Narcan) 0.4 mg PRN Q2MIN  PRN IV SEE INSTRUCTIONS;  Start 4/27/20 

at 13:45


Sodium Chloride 1,000 ml @  25 mls/hr Q24H IV  Last administered on 5/17/20at 

13:37;  Start 4/27/20 at 13:37


Naloxone HCl (Narcan) 0.4 mg PRN Q2MIN  PRN IV SEE INSTRUCTIONS;  Start 4/27/20 

at 14:30;  Status UNV


Sodium Chloride 1,000 ml @  25 mls/hr Q24H IV ;  Start 4/27/20 at 14:30;  Status

UNV


Hydromorphone HCl 30 ml @ 0 mls/hr CONT PRN  PRN IV PER PROTOCOL Last 

administered on 5/2/20at 16:08;  Start 4/27/20 at 14:30;  Stop 5/4/20 at 08:55; 

Status DC


Potassium Acetate 55 meq/Magnesium Sulfate 20 meq/ Calcium Gluconate 10 meq/ 

Multivitamins 10 ml/Chromium/ Copper/Manganese/ Seleni/Zn 0.5 ml/ Insulin Human 

Regular 35 unit/ Total Parenteral Nutrition/Amino Acids/Dextrose/ Fat Emulsion 

Intravenous 1,920 ml @  80 mls/hr TPN  CONT IV  Last administered on 4/27/20at 

22:01;  Start 4/27/20 at 22:00;  Stop 4/28/20 at 21:59;  Status DC


Bumetanide (Bumex) 2 mg [id number] IV  Last administered on 5/1/20at 13:50;  Start 

4/28/20 at 14:00;  Stop 5/2/20 at 14:10;  Status DC


Meropenem 1 gm/ Sodium Chloride 100 ml @  200 mls/hr Q8HRS IV  Last administered

on 5/18/20at 05:51;  Start 4/28/20 at 14:00


Potassium Acetate 55 meq/Magnesium Sulfate 20 meq/ Calcium Gluconate 10 meq/ 

Multivitamins 10 ml/Chromium/ Copper/Manganese/ Seleni/Zn 0.5 ml/ Insulin Human 

Regular 35 unit/ Total Parenteral Nutrition/Amino Acids/Dextrose/ Fat Emulsion 

Intravenous 1,920 ml @  80 mls/hr TPN  CONT IV  Last administered on 4/28/20at 

22:02;  Start 4/28/20 at 22:00;  Stop 4/29/20 at 21:59;  Status DC


Hydromorphone HCl (Dilaudid Standard PCA) 12 mg STK-MED ONCE IV ;  Start 4/27/20

at 14:35;  Stop 4/28/20 at 13:53;  Status DC


Artificial Tears (Artificial Tears) 1 drop PRN Q15MIN  PRN OU DRY EYE Last 

administered on 5/18/20at 08:08;  Start 4/29/20 at 05:30


Hydromorphone HCl (Dilaudid Standard PCA) 12 mg STK-MED ONCE IV ;  Start 4/28/20

at 12:05;  Stop 4/29/20 at 09:15;  Status DC


Potassium Acetate 65 meq/Magnesium Sulfate 20 meq/ Calcium Gluconate 10 meq/ 

Multivitamins 10 ml/Chromium/ Copper/Manganese/ Seleni/Zn 0.5 ml/ Insulin Human 

Regular 30 unit/ Total Parenteral Nutrition/Amino Acids/Dextrose/ Fat Emulsion 

Intravenous 1,920 ml @  80 mls/hr TPN  CONT IV  Last administered on 4/29/20at 

22:22;  Start 4/29/20 at 22:00;  Stop 4/30/20 at 21:59;  Status DC


Cyclobenzaprine HCl (Flexeril) 10 mg PRN Q6HRS  PRN PO MUSCLE SPASMS;  Start 

4/30/20 at 10:45


Potassium Acetate 55 meq/Magnesium Sulfate 20 meq/ Calcium Gluconate 10 meq/ 

Multivitamins 10 ml/Chromium/ Copper/Manganese/ Seleni/Zn 0.5 ml/ Insulin Human 

Regular 30 unit/ Total Parenteral Nutrition/Amino Acids/Dextrose/ Fat Emulsion 

Intravenous 1,920 ml @  80 mls/hr TPN  CONT IV  Last administered on 5/1/20at 

01:00;  Start 4/30/20 at 22:00;  Stop 5/1/20 at 21:59;  Status DC


Magnesium Sulfate 50 ml @ 25 mls/hr 1X  ONCE IV  Last administered on 4/30/20at 

17:18;  Start 4/30/20 at 12:45;  Stop 4/30/20 at 14:44;  Status DC


Potassium Chloride/Water 100 ml @  100 mls/hr 1X  ONCE IV  Last administered on 

5/1/20at 11:27;  Start 5/1/20 at 12:00;  Stop 5/1/20 at 12:59;  Status DC


Hydromorphone HCl (Dilaudid Standard PCA) 12 mg STK-MED ONCE IV ;  Start 4/29/20

at 10:50;  Stop 5/1/20 at 11:02;  Status DC


Hydromorphone HCl (Dilaudid Standard PCA) 12 mg STK-MED ONCE IV ;  Start 4/30/20

at 13:47;  Stop 5/1/20 at 11:03;  Status DC


Potassium Acetate 30 meq/Magnesium Sulfate 20 meq/ Calcium Gluconate 10 meq/ 

Multivitamins 10 ml/Chromium/ Copper/Manganese/ Seleni/Zn 0.5 ml/ Insulin Human 

Regular 30 unit/ Potassium Chloride 30 meq/ Total Parenteral Nutrition/Amino 

Acids/Dextrose/ Fat Emulsion Intravenous 1,920 ml @  80 mls/hr TPN  CONT IV  

Last administered on 5/1/20at 22:34;  Start 5/1/20 at 22:00;  Stop 5/2/20 at 

21:59;  Status DC


Potassium Chloride/Water 100 ml @  100 mls/hr Q1H IV  Last administered on 

5/2/20at 13:05;  Start 5/2/20 at 07:00;  Stop 5/2/20 at 10:59;  Status DC


Magnesium Sulfate 50 ml @ 25 mls/hr 1X  ONCE IV  Last administered on 5/2/20at 

10:34;  Start 5/2/20 at 10:30;  Stop 5/2/20 at 12:29;  Status DC


Potassium Chloride 75 meq/ Magnesium Sulfate 20 meq/Calcium Gluconate 10 meq/ 

Multivitamins 10 ml/Chromium/ Copper/Manganese/ Seleni/Zn 0.5 ml/ Insulin Human 

Regular 30 unit/ Total Parenteral Nutrition/Amino Acids/Dextrose/ Fat Emulsion 

Intravenous 1,920 ml @  80 mls/hr TPN  CONT IV  Last administered on 5/2/20at 

21:51;  Start 5/2/20 at 22:00;  Stop 5/3/20 at 22:00;  Status DC


Potassium Chloride 75 meq/ Magnesium Sulfate 20 meq/Calcium Gluconate 10 meq/ 

Multivitamins 10 ml/Chromium/ Copper/Manganese/ Seleni/Zn 0.5 ml/ Insulin Human 

Regular 25 unit/ Total Parenteral Nutrition/Amino Acids/Dextrose/ Fat Emulsion 

Intravenous 1,920 ml @  80 mls/hr TPN  CONT IV  Last administered on 5/3/20at 

22:04;  Start 5/3/20 at 22:00;  Stop 5/4/20 at 21:59;  Status DC


Hydromorphone HCl (Dilaudid) 0.4 mg PRN Q4HRS  PRN IVP PAIN Last administered on

5/4/20at 10:57;  Start 5/4/20 at 09:00;  Stop 5/4/20 at 18:59;  Status DC


Micafungin Sodium 100 mg/Dextrose 100 ml @  100 mls/hr Q24H IV  Last 

administered on 5/17/20at 11:07;  Start 5/4/20 at 11:00


Daptomycin 485 mg/ Sodium Chloride 50 ml @  100 mls/hr Q24H IV  Last 

administered on 5/11/20at 13:10;  Start 5/4/20 at 11:00;  Stop 5/12/20 at 07:44;

 Status DC


Potassium Chloride 75 meq/ Magnesium Sulfate 15 meq/Calcium Gluconate 8 meq/ 

Multivitamins 10 ml/Chromium/ Copper/Manganese/ Seleni/Zn 0.5 ml/ Insulin Human 

Regular 25 unit/ Total Parenteral Nutrition/Amino Acids/Dextrose/ Fat Emulsion 

Intravenous 1,920 ml @  80 mls/hr TPN  CONT IV  Last administered on 5/4/20at 

23:08;  Start 5/4/20 at 22:00;  Stop 5/5/20 at 21:59;  Status DC


Haloperidol Lactate (Haldol Inj) 3 mg 1X  ONCE IVP  Last administered on 

5/4/20at 14:37;  Start 5/4/20 at 14:30;  Stop 5/4/20 at 14:31;  Status DC


Hydromorphone HCl (Dilaudid) 1 mg PRN Q4HRS  PRN IVP PAIN Last administered on 

5/18/20at 06:25;  Start 5/4/20 at 19:00


Potassium Chloride 75 meq/ Magnesium Sulfate 15 meq/Calcium Gluconate 8 meq/ 

Multivitamins 10 ml/Chromium/ Copper/Manganese/ Seleni/Zn 0.5 ml/ Insulin Human 

Regular 20 unit/ Total Parenteral Nutrition/Amino Acids/Dextrose/ Fat Emulsion 

Intravenous 1,920 ml @  80 mls/hr TPN  CONT IV  Last administered on 5/5/20at 

22:10;  Start 5/5/20 at 22:00;  Stop 5/6/20 at 21:59;  Status DC


Lidocaine HCl (Buffered Lidocaine 1%) 3 ml STK-MED ONCE .ROUTE ;  Start 5/6/20 

at 11:31;  Stop 5/6/20 at 11:31;  Status DC


Lidocaine HCl (Buffered Lidocaine 1%) 3 ml STK-MED ONCE .ROUTE ;  Start 5/6/20 

at 12:28;  Stop 5/6/20 at 12:29;  Status DC


Lidocaine HCl (Buffered Lidocaine 1%) 6 ml 1X  ONCE INJ  Last administered on 

5/6/20at 12:53;  Start 5/6/20 at 12:45;  Stop 5/6/20 at 12:46;  Status DC


Potassium Chloride 75 meq/ Magnesium Sulfate 15 meq/Calcium Gluconate 8 meq/ 

Multivitamins 10 ml/Chromium/ Copper/Manganese/ Seleni/Zn 0.5 ml/ Insulin Human 

Regular 20 unit/ Total Parenteral Nutrition/Amino Acids/Dextrose/ Fat Emulsion 

Intravenous 1,920 ml @  80 mls/hr TPN  CONT IV  Last administered on 5/6/20at 

22:00;  Start 5/6/20 at 22:00;  Stop 5/7/20 at 21:59;  Status DC


Potassium Chloride 75 meq/ Magnesium Sulfate 15 meq/Calcium Gluconate 8 meq/ 

Multivitamins 10 ml/Chromium/ Copper/Manganese/ Seleni/Zn 0.5 ml/ Insulin Human 

Regular 15 unit/ Total Parenteral Nutrition/Amino Acids/Dextrose/ Fat Emulsion 

Intravenous 1,920 ml @  80 mls/hr TPN  CONT IV  Last administered on 5/7/20at 

22:28;  Start 5/7/20 at 22:00;  Stop 5/8/20 at 21:59;  Status DC


Vecuronium Bromide (Norcuron Bolus) 6 mg PRN Q6HRS  PRN IV VENT ASYNCHRONY;  

Start 5/7/20 at 19:15;  Stop 5/7/20 at 19:35;  Status DC


Bumetanide (Bumex) 2 mg 1X  ONCE IV  Last administered on 5/7/20at 22:09;  Start

5/7/20 at 19:45;  Stop 5/7/20 at 19:46;  Status DC


Lidocaine HCl (Buffered Lidocaine 1%) 3 ml STK-MED ONCE .ROUTE ;  Start 5/8/20 

at 07:59;  Stop 5/8/20 at 07:59;  Status DC


Midazolam HCl (Versed) 5 mg STK-MED ONCE .ROUTE ;  Start 5/8/20 at 08:36;  Stop 

5/8/20 at 08:36;  Status DC


Fentanyl Citrate (Fentanyl 5ml Vial) 250 mcg STK-MED ONCE .ROUTE ;  Start 5/8/20

at 08:36;  Stop 5/8/20 at 08:37;  Status DC


Lidocaine HCl (Buffered Lidocaine 1%) 3 ml 1X  ONCE IJ  Last administered on 

5/8/20at 09:30;  Start 5/8/20 at 09:15;  Stop 5/8/20 at 09:16;  Status DC


Midazolam HCl (Versed) 5 mg 1X  ONCE IV  Last administered on 5/8/20at 09:30;  

Start 5/8/20 at 09:15;  Stop 5/8/20 at 09:16;  Status DC


Fentanyl Citrate (Fentanyl 5ml Vial) 250 mcg 1X  ONCE IV  Last administered on 

5/8/20at 09:30;  Start 5/8/20 at 09:15;  Stop 5/8/20 at 09:16;  Status DC


Bumetanide (Bumex) 2 mg DAILY IV  Last administered on 5/18/20at 08:07;  Start 

5/8/20 at 10:00


Potassium Chloride 75 meq/ Magnesium Sulfate 15 meq/ Multivitamins 10 

ml/Chromium/ Copper/Manganese/ Seleni/Zn 0.5 ml/ Insulin Human Regular 15 unit/ 

Total Parenteral Nutrition/Amino Acids/Dextrose/ Fat Emulsion Intravenous 1,920 

ml @  80 mls/hr TPN  CONT IV  Last administered on 5/8/20at 21:59;  Start 5/8/20

at 22:00;  Stop 5/9/20 at 21:59;  Status DC


Metoclopramide HCl (Reglan Vial) 10 mg PRN Q3HRS  PRN IVP NAUSEA/VOMITING-3rd 

choice Last administered on 5/14/20at 04:25;  Start 5/9/20 at 16:45


Potassium Chloride 75 meq/ Magnesium Sulfate 15 meq/ Multivitamins 10 

ml/Chromium/ Copper/Manganese/ Seleni/Zn 0.5 ml/ Insulin Human Regular 15 unit/ 

Total Parenteral Nutrition/Amino Acids/Dextrose/ Fat Emulsion Intravenous 1,920 

ml @  80 mls/hr TPN  CONT IV  Last administered on 5/9/20at 22:41;  Start 5/9/20

at 22:00;  Stop 5/10/20 at 21:59;  Status DC


Magnesium Sulfate 50 ml @ 25 mls/hr 1X  ONCE IV  Last administered on 5/10/20at 

10:44;  Start 5/10/20 at 09:00;  Stop 5/10/20 at 10:59;  Status DC


Potassium Chloride/Water 100 ml @  100 mls/hr 1X  ONCE IV  Last administered on 

5/10/20at 09:37;  Start 5/10/20 at 09:00;  Stop 5/10/20 at 09:59;  Status DC


Duloxetine HCl (Cymbalta) 30 mg DAILY PO  Last administered on 5/11/20at 09:48; 

Start 5/10/20 at 14:00;  Stop 5/13/20 at 10:25;  Status DC


Potassium Chloride 80 meq/ Magnesium Sulfate 20 meq/ Multivitamins 10 

ml/Chromium/ Copper/Manganese/ Seleni/Zn 0.5 ml/ Insulin Human Regular 15 unit/ 

Total Parenteral Nutrition/Amino Acids/Dextrose/ Fat Emulsion Intravenous 1,920 

ml @  80 mls/hr TPN  CONT IV  Last administered on 5/10/20at 21:42;  Start 

5/10/20 at 22:00;  Stop 5/11/20 at 21:59;  Status DC


Potassium Chloride 80 meq/ Magnesium Sulfate 20 meq/ Multivitamins 10 

ml/Chromium/ Copper/Manganese/ Seleni/Zn 0.5 ml/ Insulin Human Regular 15 unit/ 

Total Parenteral Nutrition/Amino Acids/Dextrose/ Fat Emulsion Intravenous 1,920 

ml @  80 mls/hr TPN  CONT IV  Last administered on 5/11/20at 22:20;  Start 

5/11/20 at 22:00;  Stop 5/12/20 at 21:59;  Status DC


Lidocaine HCl (Buffered Lidocaine 1%) 3 ml STK-MED ONCE .ROUTE ;  Start 5/12/20 

at 09:54;  Stop 5/12/20 at 09:55;  Status DC


Hydromorphone HCl (Dilaudid Standard PCA) 12 mg STK-MED ONCE IV ;  Start 5/1/20 

at 15:50;  Stop 5/12/20 at 11:24;  Status DC


Potassium Chloride 80 meq/ Magnesium Sulfate 20 meq/ Multivitamins 10 

ml/Chromium/ Copper/Manganese/ Seleni/Zn 0.5 ml/ Insulin Human Regular 15 unit/ 

Total Parenteral Nutrition/Amino Acids/Dextrose/ Fat Emulsion Intravenous 1,920 

ml @  80 mls/hr TPN  CONT IV  Last administered on 5/12/20at 21:40;  Start 

5/12/20 at 22:00;  Stop 5/13/20 at 21:59;  Status DC


Lidocaine HCl (Buffered Lidocaine 1%) 6 ml 1X  ONCE INJ  Last administered on 

5/12/20at 14:15;  Start 5/12/20 at 14:15;  Stop 5/12/20 at 14:16;  Status DC


Potassium Chloride 80 meq/ Magnesium Sulfate 20 meq/ Multivitamins 10 

ml/Chromium/ Copper/Manganese/ Seleni/Zn 1 ml/ Insulin Human Regular 15 unit/ 

Total Parenteral Nutrition/Amino Acids/Dextrose/ Fat Emulsion Intravenous 1,920 

ml @  80 mls/hr TPN  CONT IV  Last administered on 5/13/20at 22:04;  Start 

5/13/20 at 22:00;  Stop 5/14/20 at 21:59;  Status DC


Potassium Chloride/Water 100 ml @  100 mls/hr 1X  ONCE IV  Last administered on 

5/14/20at 11:34;  Start 5/14/20 at 11:00;  Stop 5/14/20 at 11:59;  Status DC


Potassium Chloride 90 meq/ Magnesium Sulfate 20 meq/ Multivitamins 10 

ml/Chromium/ Copper/Manganese/ Seleni/Zn 1 ml/ Insulin Human Regular 15 unit/ 

Total Parenteral Nutrition/Amino Acids/Dextrose/ Fat Emulsion Intravenous 1,920 

ml @  80 mls/hr TPN  CONT IV  Last administered on 5/14/20at 22:57;  Start 

5/14/20 at 22:00;  Stop 5/15/20 at 21:59;  Status DC


Potassium Chloride 90 meq/ Magnesium Sulfate 20 meq/ Multivitamins 10 

ml/Chromium/ Copper/Manganese/ Seleni/Zn 1 ml/ Insulin Human Regular 15 unit/ 

Total Parenteral Nutrition/Amino Acids/Dextrose/ Fat Emulsion Intravenous 1,920 

ml @  80 mls/hr TPN  CONT IV  Last administered on 5/15/20at 22:48;  Start 

5/15/20 at 22:00;  Stop 5/16/20 at 21:59;  Status DC


Potassium Chloride 90 meq/ Magnesium Sulfate 20 meq/ Multivitamins 10 

ml/Chromium/ Copper/Manganese/ Seleni/Zn 1 ml/ Insulin Human Regular 15 unit/ 

Total Parenteral Nutrition/Amino Acids/Dextrose/ Fat Emulsion Intravenous 1,890 

ml @  78.75 mls/ hr TPN  CONT IV  Last administered on 5/16/20at 22:15;  Start 

5/16/20 at 22:00;  Stop 5/17/20 at 21:59;  Status DC


Linezolid/Dextrose 300 ml @  300 mls/hr Q12HR IV  Last administered on 5/18/20at

08:08;  Start 5/17/20 at 09:00


Daptomycin 450 mg/ Sodium Chloride 50 ml @  100 mls/hr Q24H IV  Last 

administered on 5/18/20at 08:08;  Start 5/17/20 at 09:00


Potassium Chloride 90 meq/ Magnesium Sulfate 20 meq/ Multivitamins 10 

ml/Chromium/ Copper/Manganese/ Seleni/Zn 1 ml/ Insulin Human Regular 15 unit/ 

Total Parenteral Nutrition/Amino Acids/Dextrose/ Fat Emulsion Intravenous 1,890 

ml @  78.75 mls/ hr TPN  CONT IV  Last administered on 5/17/20at 21:34;  Start 

5/17/20 at 22:00;  Stop 5/18/20 at 21:59


Lorazepam (Ativan Inj) 2 mg STK-MED ONCE .ROUTE ;  Start 5/17/20 at 14:58;  Stop

5/17/20 at 14:58;  Status DC


Metoprolol Tartrate (Lopressor Vial) 5 mg 1X  ONCE IVP  Last administered on 

5/17/20at 15:31;  Start 5/17/20 at 15:15;  Stop 5/17/20 at 15:16;  Status DC


Lorazepam (Ativan Inj) 2 mg 1X  ONCE IVP  Last administered on 5/17/20at 15:30; 

Start 5/17/20 at 15:15;  Stop 5/17/20 at 15:16;  Status DC


Enoxaparin Sodium (Lovenox 40mg Syringe) 40 mg Q24H SQ  Last administered on 

5/17/20at 17:48;  Start 5/17/20 at 17:00


Lorazepam (Ativan Inj) 1 mg PRN Q4HRS  PRN IVP ANXIETY / AGITATION MILD-MOD;  

Start 5/17/20 at 19:15


Lorazepam (Ativan Inj) 2 mg PRN Q4HRS  PRN IVP ANXIETY / AGITATION SEVERE;  

Start 5/17/20 at 19:15





Active Scripts


Active


Reported


Bisoprolol Fumarate 5 Mg Tablet 10 Mg PO DAILY


Vitals/I & O





Vital Sign - Last 24 Hours








 5/17/20 5/17/20 5/17/20 5/17/20





 11:00 12:00 12:00 13:00


 


Temp   98.6 





   98.6 


 


Pulse 119  102 111


 


Resp 17  20 36


 


B/P (MAP) 169/73 (105)  181/92 (121) 140/65 (90)


 


Pulse Ox 100  98 94


 


O2 Delivery Nasal Cannula Nasal Cannula Nasal Cannula Nasal Cannula


 


O2 Flow Rate 2.0 2.0 2.0 2.0


 


    





    





 5/17/20 5/17/20 5/17/20 5/17/20





 14:00 14:05 14:54 15:00


 


Pulse 120   148


 


Resp 30 38 29 36


 


B/P (MAP) 204/79 (120)   152/70 (97)


 


Pulse Ox 94 98 100 99


 


O2 Delivery Nasal Cannula Nasal Cannula Nasal Cannula Nasal Cannula


 


O2 Flow Rate 4.0 2.0 2.0 4.0





 5/17/20 5/17/20 5/17/20 5/17/20





 15:31 15:57 16:00 17:00


 


Temp   98.4 





   98.4 


 


Pulse 145  116 123


 


Resp   28 30


 


B/P (MAP) 124/65  130/61 (84) 166/94 (118)


 


Pulse Ox   99 99


 


O2 Delivery  Nasal Cannula Nasal Cannula Nasal Cannula


 


O2 Flow Rate  2.0 4.0 4.0


 


    





    





 5/17/20 5/17/20 5/17/20 5/17/20





 18:00 19:00 20:00 20:00


 


Temp    98.4





    98.4


 


Pulse 133 130  132


 


Resp 32 38  34


 


B/P (MAP) 144/74 (97) 137/77 (97)  151/84 (106)


 


Pulse Ox 3 96  96


 


O2 Delivery Nasal Cannula Nasal Cannula Nasal Cannula Nasal Cannula


 


O2 Flow Rate 4.0 3.0 3.0 3.0


 


    





    





 5/17/20 5/17/20 5/17/20 5/17/20





 21:00 22:00 23:00 23:43


 


Pulse 110 125 140 


 


Resp 30 32 34 40


 


B/P (MAP) 112/60 (77) 100/56 (71) 90/54 (66) 


 


Pulse Ox 97 98 97 93


 


O2 Delivery Nasal Cannula Nasal Cannula Nasal Cannula Nasal Cannula


 


O2 Flow Rate 3.0 3.0 3.0 3.0





 5/18/20 5/18/20 5/18/20 5/18/20





 00:00 00:00 00:18 01:00


 


Temp 98.5   





 98.5   


 


Pulse 125   100


 


Resp 23  22 22


 


B/P (MAP) 168/89 (115)   84/56 (65)


 


Pulse Ox 98  98 100


 


O2 Delivery Nasal Cannula Nasal Cannula Nasal Cannula Nasal Cannula


 


O2 Flow Rate 5.0 5.0 5.0 5.0


 


    





    





 5/18/20 5/18/20 5/18/20 5/18/20





 02:00 03:00 04:00 04:00


 


Temp   97.6 





   97.6 


 


Pulse 95 104 94 


 


Resp 24 26 21 


 


B/P (MAP) 93/54 (67) 113/70 (84) 97/55 (69) 


 


Pulse Ox 100 99 98 


 


O2 Delivery Nasal Cannula Nasal Cannula Nasal Cannula Nasal Cannula


 


O2 Flow Rate 5.0 5.0 3.0 5.0


 


    





    





 5/18/20 5/18/20 5/18/20 5/18/20





 05:00 06:00 06:25 06:55


 


Pulse 100 124  


 


Resp 24 38 29 22


 


B/P (MAP) 93/53 (66) 166/85 (112)  


 


Pulse Ox 97 95 98 100


 


O2 Delivery Nasal Cannula Nasal Cannula Nasal Cannula Nasal Cannula


 


O2 Flow Rate 3.0 3.0 3.0 3.0





 5/18/20 5/18/20 5/18/20 





 07:00 08:00 08:18 


 


Temp 98.4   





 98.4   


 


Pulse 113 93  


 


Resp 22 16  


 


B/P (MAP) 119/60 (79) 95/48 (64)  


 


Pulse Ox 100 100  


 


O2 Delivery Nasal Cannula Nasal Cannula Nasal Cannula 


 


O2 Flow Rate 3.0 3.0 3.0 














Intake and Output   


 


 5/17/20 5/17/20 5/18/20





 15:00 23:00 07:00


 


Intake Total 775 ml 952.73 ml 1726.5 ml


 


Output Total 2685 ml 950 ml 700 ml


 


Balance -1910 ml 2.73 ml 1026.5 ml

















CLEMENTINA PANTOJA MD           May 18, 2020 10:10

## 2020-05-18 NOTE — NUR
At shift change, day shift RN told me that Bumex was held because patient seemed too dry, 
but during the day day shift RN gave patient 1 L fluid. I couldn't find an order or evidence 
of this liter of fluid in charting.



Upon first assessment of patient at 1930, patient was breathing 40-50 breaths per minute, 
very shallow breathing, lungs sound wet and wheezy. Patient saying she was not getting 
enough oxygen and needed a break. Patient orientedx3 but very forgetful. Patient also saying 
things like "how am I supposed to walk out of her tonight when I'm breathing like this?" and 
"Isn't it better for you to stay up here tonight." Patient also forgets that medication was 
given and continuously asks for pain medication and says 4 hours is too long to wait. 
Patient's boyfriend also states that patient does not seem like herself. Fentanyl was given, 
Precedex bolus given, patient still not improved. Dr. Philip notified of patient condition, 
orders received to give 30 mg IV Lasix x1 dose and if patient continues to struggle to put 
patient back on ventilator. 

-------------------------------------------------------------------------------

Addendum: 05/19/20 at 0130 by TONY JAMES RN RN

-------------------------------------------------------------------------------

After giving Lasix, patient's lungs improved but patient was still breathing 50 
breaths/minute and kept repeating that she "couldn't do this anymore". Patient was given 2 
mg Ativan to help relax her, but Ativan had no effect. ABG obtained and CO2 high at 78. 
Patient placed back on ventilator. Within 5 minutes of being placed on the ventilator, 
patient became extremely relaxed. HR dropped to 100s-110s, breathing 16-20 breaths per 
minute, no longer looking anxious or struggling to breathe, and BP decreasing. After being 
so relaxed and finally sleeping, SBP continued to drop into 60s-70s. When patient is awake, 
systolic pressure is in 110s. Dr. Lozada notified of BP issues. Orders received to continue 
to monitor BP and if it continues to be low give 25% Albumin.

## 2020-05-18 NOTE — PDOC
Objective:


Objective:


D/w SLP earlier - ?video later this week


Nurse present in room - tachycardic, "stuporous" this morning, less bilious 

output, NG replaced.


On TPN, IV PPI.


Vital Signs:





                                   Vital Signs








  Date Time  Temp Pulse Resp B/P (MAP) Pulse Ox O2 Delivery O2 Flow Rate FiO2


 


5/18/20 10:57  150 35 156/62 (93) 95 Nasal Cannula 3.0 


 


5/18/20 07:00 98.4       





 98.4       








Labs:





Laboratory Tests








Test


 5/17/20


17:46 5/17/20


23:47 5/18/20


05:56 5/18/20


06:00


 


Glucose (Fingerstick) 145 mg/dL  240 mg/dL  162 mg/dL  


 


White Blood Count    13.8 x10^3/uL 


 


Red Blood Count    2.81 x10^6/uL 


 


Hemoglobin    7.9 g/dL 


 


Hematocrit    24.9 % 


 


Mean Corpuscular Volume    89 fL 


 


Mean Corpuscular Hemoglobin    28 pg 


 


Mean Corpuscular Hemoglobin


Concent 


 


 


 32 g/dL 





 


Red Cell Distribution Width    18.1 % 


 


Platelet Count    486 x10^3/uL 


 


Neutrophils (%) (Auto)    72 % 


 


Lymphocytes (%) (Auto)    21 % 


 


Monocytes (%) (Auto)    6 % 


 


Eosinophils (%) (Auto)    1 % 


 


Basophils (%) (Auto)    0 % 


 


Neutrophils # (Auto)    10.0 x10^3/uL 


 


Lymphocytes # (Auto)    2.9 x10^3/uL 


 


Monocytes # (Auto)    0.8 x10^3/uL 


 


Eosinophils # (Auto)    0.1 x10^3/uL 


 


Basophils # (Auto)    0.0 x10^3/uL 


 


Sodium Level    141 mmol/L 


 


Potassium Level    4.8 mmol/L 


 


Chloride Level    99 mmol/L 


 


Carbon Dioxide Level    42 mmol/L 


 


Anion Gap    0 


 


Blood Urea Nitrogen    26 mg/dL 


 


Creatinine    0.6 mg/dL 


 


Estimated GFR


(Cockcroft-Gault) 


 


 


 106.3 





 


Glucose Level    164 mg/dL 


 


Calcium Level    9.3 mg/dL 


 


Phosphorus Level    4.3 mg/dL 


 


Magnesium Level    2.0 mg/dL 


 


Triglycerides Level    221 mg/dL 


 


Test


 5/18/20


11:05 


 


 





 


Glucose (Fingerstick) 236 mg/dL    














  BLOOD CULTURE  Preliminary  


        NO GROWTH AFTER 1 DAY


Imaging:


KUB 5/18


IMPRESSION:  


Enteric tube terminates in the stomach.





CXR 5/18


Impression: Bilateral perihilar infiltrates with small bilateral pleural 

effusions, left greater than right.





PE:





GEN: NAD


HEENT: trach/speaking valve


LUNGS: tachypneic


HEART: tachycardic


ABD: distended, soft


NEURO/PSYCH: anxious





A/P:


Gallstone pancreatitis, MOSF





--


Continue support.





Hemodynamically unstable?:  No


Is patient in severe pain?:  No


Is NPO status required?:  Yes











RAGHAVENDRA MURCIA         May 18, 2020 12:25

## 2020-05-18 NOTE — NUR
SS following up with discharge planning. SS reviewed pt chart and discussed with pt RN. Pt 
trach capped for 48 hours now. Pt now on nasal cannula oxygen. Pt remains NPO. Pt has NG 
tube and is currently on TPN. Pt on IV antibiotics times three. Pt has three ROBERT drains. Pt 
walked halls with PT/OT. SS will continue to follow for discharge planning.

## 2020-05-18 NOTE — NUR
during 0600 rounds, NG is out of pt's nose and seen hooked around her thumb and gastric 
drainage on her gown. pt states, "I don't know how that happened." pt requesting pain 
medication and anxiety medication before we attempt to replace NG, "I'm going to need 
something. I don't think I can do it." Dilaudid given per PRN order, bolus of Precedex also 
given per pt request. first attempt at NG replacement, tube seen coiled in her mouth. second 
attempt successful, air bolus injected and auscultated with assistance from JAKUB Nelson. 
stat KUB ordered to check placement. will pass on in report, will continue to closely 
monitor.

## 2020-05-18 NOTE — NUR
pt was unable to work with physical therapy today due to mentally being stuperous. pt will 
do ankle pumps while in bed when requested to do so.

 pt having trouble dealing with anxiety and keeps asking for medication  "to put me to 
sleep". 

frequent oral care being performed. trach remains capped.

## 2020-05-18 NOTE — PDOC
[facility] PROGRESS NOTE


Chief Complaint


Chief Complaint


Acute hypoxic Respiratory failure requiring mechanical ventilation (now 

extubated for several days but still with tracheostomy)


Tracheostomy


bilateral pleural effusions/pulm edema 


Sepsis


Severe Acute gallstone pancreatitis (not a surgical candidate at this time) with

necrosis


Acute kidney failure now requiring dialysis


Salpingitis


Gallstones (Calculus of gallbladder with acute cholecystitis without obstruc

tion)


HTN 


Leukocytosis 


Hypoxia


Uterine fibroid


Intractable pain


Intractable nausea


Covid 19 negative. 


Acute on chronic anemia 


EEG: No seizure activityFever  - better currently - intermittent could be from 

underlying pancreatitis blood cults 5/4 - neg so far


? Ileus with vomiting


Abd distention - U/S and CT reviewed s/p 0.4 L of opaque, debris-containing 

ascites was removed 5/6


Acute pancreatitis with persistent necrosis


- 4/27 status post ROBERT drain placement + C paropsilosis. s/p additional drains 

5/8


Anemia - S/p PRBCs


Cholelithiasis with thickening of the gallbladder wall.


Leucocytosis improving


JED, hyperkalemia, Metabolic acidosis off dialysis


Acute hypoxic resp failure ,bilateral pleural effusion and atelectasis


hypocalcemia 


Prediabetes


HTN


s/p trach


ESRD on HD


Hyperglycemia





History of Present Illness


History of Present Illness


5/18/2020


Patient seen and examined in the ICU


She had an episode yesterday of tachycardia and severe agitation we gave her 

some Ativan


After that she seemed to have stroke symptoms but now that the Ativan has wore 

off her stroke symptoms have resolved


She is a little more calm this morning but even just talking to her gets her 

agitated and anxious


She is currently on TPN and Precedex


She has NG to suction


She has Chino to bedside drainage


She has SCDs


Discussed with RN


Reviewed chart 


Remains very critically ill











5/17/2020


Patient seen and examined in the ICU


She is up in bed sleeping awoke I really do not understand what she is saying to

her but she is trying to talk


Chart reviewed


Discussed with RN


She is on IV Zosyn IV micafungin and IV daptomycin


Also getting TPN


Sedated with Precedex


She also gets Dilaudid every 4


She remains critically ill








5/16/2020


Patient seen and examined in the ICU


She is up in the bed but extremely weak and frail


Ask if there is "anything we can do"


I explained to her that we will do everything we can but that she is very ill 

and we need to give it time


Reviewed with nurse


Reviewed chart


She is on IV micafungin June and meropenem


She is on IV TPN


She is also receiving Precedex for sedation


She has NG to suction and ROBERT drain x5


Chino to bedside drainage


Extremely critically ill








5/15/2020


Patient seen and examined in the ICU


She is resting comfortably but sedated


Has O2 per nasal cannula


Tracheostomy is clean


She is on Precedex TPN and has an NG to suction


Chart reviewed





5/14/2020


Patient seen and examined in the ICU


She now has a speaking valve in her trach and is able to talk


She is extremely weak and shaky


Chart reviewed discussed with RN





5/13/2020


Patient seen and examined in the ICU


She is up in the chair with O2 via trach shield


Extremely anxious


Cannot talk but is trying to write things to me although I cannot understand 

what she is writing


Discussed with RN


Chart reviewed


Still on IV TPN


Still extremely ill








5/12/2020


Patient seen and examined in the ICU


She now has a trach shield


Pleasantly confused but sedated with Precedex


Chart reviewed


Discussed with RN


She has IV TPN


Still very critically ill





5/11/2020


Patient seen and examined


She remains on the vent but spontaneous respirations with 20 of pressure support

and 30% FiO2


He still has NG to suction


Appears extremely ill and weak and confused


Has IV TPN Precedex antibiotics


Ch[place] is to bedside drainage


She has SCDs and ProCare boots


She is on IV meropenem and daptomycin and micafungin


Chart reviewed


Discussed with RN


Patient is still critically ill








Ms Tadeo is a 48yo F w/ PMHx HTN, prediabetes who presented to the emergency 

room with complaints of abdominal pain on 3/16/2020. Found with Lipase 17201, 

, , Bilirubin 1.4.


CT abdomen confirms pancreatic inflammation, peripancreatic fluid and 

inflammatory changes around the pancreas consistent with pancreatitis. 

Cholelithiasis and 1.4cm uterine fibroid as well as possible left salpingitis. 

Admitted for further care


GI, General surgery, ID, Pulm consulted.





3/17: PICC placed per IR. Renal US negative. Started on levophed. Repeat CT 

abdomen w/ necrosis; 3/18: Dialysis catheter per nephrology; 3/19: On BiPAP; 

3/20: BiPAP, dialysis; 3/21: Overnight Tmax 101.7 , still on BiPAP FiO2 40%, 

still on low dose Levophed gtt, TPN initiated. On dialysis


4/6: Tracheostomy; 4/12: S/p tracheostomy on vent spontaneous respirations with 

5 of pressure support 35% FiO2, rectal tube and a Chino, off pressors; 

4/14:Still on vent via trach. Removed PICC and CVC LIJ and replaced. CT 

chest/abd/pelvis with bilateral pleural effusion and ascites.


4/15: Renal function stable. Still on vent. More interactive today. Miming wish 

for food. Plan discussed for thoracentesis/paracentesis with daughters today. 

They were under impression patient was doing worse due to a miscommunication 

which has been clarified over the phone. 4.3L removed.


4/17: Febrile overnight 101.8F. More interactive, still on vent. Asking for ice 

by miming; 4/18: Afebrile overnight. TMax last 24 hours 100.6F. Hb 7.1. 

Interactive when awake. 4/22: Transfusion 1u PRBC (6U total since admit)


4/23-4/26: TPN and precedex, vent.


4/27: Tmax 101F overnight. Hb 8.2. HD cath out since 4/24. Alert. On vent SIMV 

35% FiO2. Surgery: ex-lap, no naif or pancreatic necrosectomy 2/2 profound 

inflammation.


4/28: Seen POD #1. Afebrile overnight. BUN 62. CBC WNL today.Remains on vent via

trach, TPN. Able to point today and indicate she wishes her daughters and Rolf 

to be involved in her care.


4/29: Hb 7.4, Na 151. Remains on vent via trach, TPN. off HD for now. Not 

tolerating trach shield well.


4/30: Negative US for UE DVT. More relaxed today. Has some increase in UOP. 

Tolerating vent well. She is requesting to eat and drink via writing. T max 100F

24 hours ago, but 101F at 1200. Right PICC placed. [facility]


5[facility]: Na 153 today. Good UOP. Tmax 101F at 1200 on 4/30. She becomes a bit an

xious and did not sleep much last night, wishes to try to sleep this morning


5/2: Able to speak well with speaking valve today. Na 153, K2.9, Mg 1.8, Cr 1. 

100.4F axillary temp overnight. Asking for food.


5/3: Afebrile overnight. ABG with pH 7.27/75/123. Hb 7.1. Na 153. PCA settings 

decreased


5/4: No acute events reported overnight, case discussed with nursing staff hunter

ent in no acute distress no complaints during my visit


5/5: Patient responding to verbal stimuli.  She is saturating well and not 

requiring ventilation support, no acute events reported overnight seems to be 

slowly improving


5/6: Patient requiring transfusion of packed red blood cells due to anemia, no 

evidence of acute bleeding, appreciate GI consultant recommendations


5/7: Patient required ventilatory support given that she desaturated, she is 

lying in bed in mild distress, case discussed with nursing staff, no evidence of

bleeding, h an h noted. 


5/8: Underwent IR procedure as follows


BRIEF OPERATIVE NOTE


Pre-Op Diagnosis


Pancreatitis with pseudocysts, suspected infection


Post-Op Diagnosis


same


Procedure Performed


CT abdominal Drains x 3


Surgeon


Hardy


Anesthesia Type:  Conscious Sedation


Findings


3 abdominal drains, 14F, with turbid pancreatic fluid and necrotic debris in 

each.


Complications


No immediate








5/9: Patient today somewhat restless and having bilious secretions from ET tube,

imaging studies ordered, discussed with consultant. Pretty poor prognosis, 

hopefully is not a fistula, poor surgical candidate. 





5/10: Imaging with no acute events, she seems more stable today compared to 

yesterday. Encouraged as much activity as possible patient at high risk for 

severe depression.





Vitals/I&O


Vitals/I&O:





                                   Vital Signs








  Date Time  Temp Pulse Resp B/P (MAP) Pulse Ox O2 Delivery O2 Flow Rate FiO2


 


5/18/20 08:18      Nasal Cannula 3.0 


 


5/18/20 08:00  93 16 95/48 (64) 100   


 


5/18/20 07:00 98.4       





 98.4       














                                    I & O   


 


 5/17/20 5/17/20 5/18/20





 15:00 23:00 07:00


 


Intake Total 775 ml 952.73 ml 1726.5 ml


 


Output Total 2685 ml 950 ml 700 ml


 


Balance -1910 ml 2.73 ml 1026.5 ml











Physical Exam


Physical Exam:


GENERAL: Semi-sedated  ,comfortable


HEENT:  oral cavity dry, NGT


NECK:  Trach with speaking valve


LUNGS: no rhonchi


HEART:  S1, S2, regular 


ABDOMEN: mod Distended, hypoactive BS, tender, + drains x 3


: Chino (4/14)


EXTREMITIES: Generalized edema, improving, no cyanosis, SCDs bilaterally 


SKIN: Warm and dry.  No generalized rash.  


CNS: Very weak 


PICC(4/30) clean


General:  moderate distress


Heart:  Regular rate, Normal S1, Normal S2, No murmurs, Gallops


Lungs:  Wheezing, Other (decrease bs)


Abdomen:  Soft, No tenderness, Other (drains in place)


Extremities:  No clubbing, No cyanosis, No edema, Normal pulses, No 

tenderness/swelling


Skin:  Other (warm, dry)





Labs


Labs:





Laboratory Tests








Test


 5/17/20


12:00 5/17/20


17:46 5/17/20


23:47 5/18/20


05:56


 


Glucose (Fingerstick)


 225 mg/dL


(70-99) 145 mg/dL


(70-99) 240 mg/dL


(70-99) 162 mg/dL


(70-99)


 


Test


 5/18/20


06:00 


 


 





 


White Blood Count


 13.8 x10^3/uL


(4.0-11.0) 


 


 





 


Red Blood Count


 2.81 x10^6/uL


(3.50-5.40) 


 


 





 


Hemoglobin


 7.9 g/dL


(12.0-15.5) 


 


 





 


Hematocrit


 24.9 %


(36.0-47.0) 


 


 





 


Mean Corpuscular Volume 89 fL ()    


 


Mean Corpuscular Hemoglobin 28 pg (25-35)    


 


Mean Corpuscular Hemoglobin


Concent 32 g/dL


(31-37) 


 


 





 


Red Cell Distribution Width


 18.1 %


(11.5-14.5) 


 


 





 


Platelet Count


 486 x10^3/uL


(140-400) 


 


 





 


Neutrophils (%) (Auto) 72 % (31-73)    


 


Lymphocytes (%) (Auto) 21 % (24-48)    


 


Monocytes (%) (Auto) 6 % (0-9)    


 


Eosinophils (%) (Auto) 1 % (0-3)    


 


Basophils (%) (Auto) 0 % (0-3)    


 


Neutrophils # (Auto)


 10.0 x10^3/uL


(1.8-7.7) 


 


 





 


Lymphocytes # (Auto)


 2.9 x10^3/uL


(1.0-4.8) 


 


 





 


Monocytes # (Auto)


 0.8 x10^3/uL


(0.0-1.1) 


 


 





 


Eosinophils # (Auto)


 0.1 x10^3/uL


(0.0-0.7) 


 


 





 


Basophils # (Auto)


 0.0 x10^3/uL


(0.0-0.2) 


 


 





 


Sodium Level


 141 mmol/L


(136-145) 


 


 





 


Potassium Level


 4.8 mmol/L


(3.5-5.1) 


 


 





 


Chloride Level


 99 mmol/L


() 


 


 





 


Carbon Dioxide Level


 42 mmol/L


(21-32) 


 


 





 


Anion Gap 0 (6-14)    


 


Blood Urea Nitrogen


 26 mg/dL


(7-20) 


 


 





 


Creatinine


 0.6 mg/dL


(0.6-1.0) 


 


 





 


Estimated GFR


(Cockcroft-Gault) 106.3 


 


 


 





 


Glucose Level


 164 mg/dL


(70-99) 


 


 





 


Calcium Level


 9.3 mg/dL


(8.5-10.1) 


 


 





 


Phosphorus Level


 4.3 mg/dL


(2.6-4.7) 


 


 





 


Magnesium Level


 2.0 mg/dL


(1.8-2.4) 


 


 





 


Triglycerides Level


 221 mg/dL


(0-150) 


 


 














Assessment and Plan


Assessmemt and Plan


Problems


Medical Problems:


(1) Acute pancreatitis


Status: Acute  





(2) Cholelithiasis


Status: Acute  





Acute hypoxic Respiratory failure requiring mechanical ventilation (on vent 

since 3/23)


Tracheostomy


bilateral pleural effusions/pulm edema 


Sepsis


Severe Acute gallstone pancreatitis (not a surgical candidate at this time) with

necrosis


Acute kidney failure now requiring dialysis


Salpingitis


Gallstones (Calculus of gallbladder with acute cholecystitis without 

obstruction)


HTN 


Leukocytosis 


Hypoxia


Uterine fibroid


Intractable pain


Intractable nausea


Covid 19 negative. 


Acute on chronic anemia 


EEG: No seizure activityFever  - better currently - intermittent could be from 

underlying pancreatitis blood cults 5/4 - neg so far


? Ileus with vomiting


Abd distention - U/S and CT reviewed s/p 0.4 L of opaque, debris-containing 

ascites was removed 5/6


Acute pancreatitis with persistent necrosis


- 4/27 status post ROBERT drain placement + C paropsilosis. s/p additional drains 

5/8


Anemia - S/p PRBCs


Cholelithiasis with thickening of the gallbladder wall.


Leucocytosis improving


JED, hyperkalemia, Metabolic acidosis off dialysis


Acute hypoxic resp failure ,bilateral pleural effusion and atelectasis


hypocalcemia 


Prediabetes


HTN


s/p trach


ESRD on HD


Hyperglycemia








Plan


ICU monitoring


Wound care


Hold off on Ativan for now as she gets too weak with it


Humidified O2 via nasal cannula for now but we can use trach shield as backup


NG suctioning


Nebulizers


Continue IV antibiotics and micafungin


Sedation with Precedex


TPN protocol


Continue Chino to bedside drainage


Tracheostomy care


Hope to eventually move towards decannulation (we have a speaking valve for now)


Trend labs


Appreciate subspecialist input


She is critically ill


Prognosis is extremely guarded at best still


Total time 31-minute





Comment


Review of Relevant


I have reviewed the following items josy (where applicable) has been applied.


Medications:





Current Medications








 Medications


  (Trade)  Dose


 Ordered  Sig/Yee


 Route


 PRN Reason  Start Time


 Stop Time Status Last Admin


Dose Admin


 


 Potassium


 Chloride 90 meq/


 Magnesium Sulfate


 20 meq/


 Multivitamins 10


 ml/Chromium/


 Copper/Manganese/


 Seleni/Zn 1 ml/


 Insulin Human


 Regular 15 unit/


 Total Parenteral


 Nutrition/Amino


 Acids/Dextrose/


 Fat Emulsion


 Intravenous  1,890 ml @ 


 78.75 mls/


 hr  TPN  CONT


 IV


   5/17/20 22:00


 5/18/20 21:59  5/17/20 21:34





 


 Metoprolol


 Tartrate


  (Lopressor Vial)  5 mg  1X  ONCE


 IVP


   5/17/20 15:15


 5/17/20 15:16 DC 5/17/20 15:31





 


 Lorazepam


  (Ativan Inj)  2 mg  1X  ONCE


 IVP


   5/17/20 15:15


 5/17/20 15:16 DC 5/17/20 15:30





 


 Enoxaparin Sodium


  (Lovenox 40mg


 Syringe)  40 mg  Q24H


 SQ


   5/17/20 17:00


    5/17/20 17:48














Hemodynamically unstable?:  No


Is patient in severe pain?:  No


Is NPO status required?:  Yes











HECTOR MASON III DO           May 18, 2020 10:39

## 2020-05-18 NOTE — RAD
KUB 

 

INDICATION: NG tube placement.

 

COMPARISON: None.

 

TECHNIQUE: Supine view of the abdomen was obtained.

 

FINDINGS:

 

Nonobstructive bowel gas pattern. No free air on this limited supine 

image. 

 

Enteric tube terminates in the stomach. Right lower quadrant surgical 

drains. 

 

No acute osseous abnormality.

 

IMPRESSION:  

Enteric tube terminates in the stomach.

 

Electronically signed by: Anival Moody MD (5/18/2020 7:15 AM) [id number]

## 2020-05-18 NOTE — PDOC
Infectious Disease Note


Subjective:


Subjective


Pt resting quietly


d/w rn


no new issues


 off vent, trach o2





Vital Signs:


Vital Signs





Vital Signs








  Date Time  Temp Pulse Resp B/P (MAP) Pulse Ox O2 Delivery O2 Flow Rate FiO2


 


5/18/20 08:18      Nasal Cannula 3.0 


 


5/18/20 08:00  93 16 95/48 (64) 100   


 


5/18/20 07:00 98.4       





 98.4       











Physical Exam:


PHYSICAL EXAM


GENERAL: Semi-sedated  ,comfortable


HEENT:  oral cavity dry, NGT


NECK:  Trach with speaking valve


LUNGS: no rhonchi


HEART:  S1, S2, regular 


ABDOMEN: mod Distended, hypoactive BS, tender, + drains x 3


: [place] (4/14)


EXTREMITIES: Generalized edema, improving, no cyanosis, SCDs bilaterally 


SKIN: Warm and dry.  No generalized rash.  


CNS: Very weak 


PICC(4/30) clean





Medications:


Inpatient Meds:





Current Medications








 Medications


  (Trade)  Dose


 Ordered  Sig/Yee  Start Time


 Stop Time Status Last Admin


Dose Admin


 


 Acetaminophen


  (Tylenol Supp)  650 mg  PRN Q6HRS  PRN  3/24/20 10:30


    5/5/20 09:12


650 MG


 


 Acetaminophen


  (Tylenol)  650 mg  PRN Q6HRS  PRN  3/21/20 03:36


 5/13/20 10:25 DC 4/16/20 19:56


650 MG


 


 Albumin Human  200 ml @ 


 200 mls/hr  1X PRN  PRN  4/21/20 08:00


 4/21/20 13:59 DC 4/21/20 08:40


200 MLS/HR


 


 Albuterol Sulfate


  (Ventolin Neb


 Soln)  2.5 mg  1X  ONCE  3/17/20 22:30


 3/17/20 22:31 DC 3/18/20 00:56


2.5 MG


 


 Alteplase,


 Recombinant


  (Cathflo For


 Central Catheter


 Clearance)  1 mg  1X  ONCE  4/24/20 10:45


 4/24/20 10:46 DC 4/24/20 11:44


1 MG


 


 Amino Acids/


 Glycerin/


 Electrolytes  1,000 ml @ 


 75 mls/hr  [id number]  4/20/20 21:15


   UNV  





 


 Artificial Tears


  (Artificial


 Tears)  1 drop  PRN Q15MIN  PRN  4/29/20 05:30


    5/18/20 08:08


1 DROP


 


 Atenolol


  (Tenormin)  100 mg  DAILY  3/17/20 09:00


 3/16/20 20:08 DC  





 


 Atropine Sulfate


  (ATROPINE 0.5mg


 SYRINGE)  0.5 mg  PRN Q5MIN  PRN  4/2/20 08:15


     





 


 Benzocaine


  (Hurricaine One)  1 spray  1X  ONCE  3/20/20 14:30


 3/20/20 14:31 DC 3/20/20 16:38


1 SPRAY


 


 Bisacodyl


  (Dulcolax Supp)  10 mg  STK-MED ONCE  4/27/20 10:59


 4/27/20 10:59 DC  





 


 Bumetanide


  (Bumex)  2 mg  DAILY  5/8/20 10:00


    5/18/20 08:07


2 MG


 


 Bupivacaine HCl/


 Epinephrine Bitart


  (Sensorcain-Epi


 0.5%-1:910914 Mpf)  30 ml  STK-MED ONCE  4/27/20 10:58


 4/27/20 10:58 DC 4/27/20 12:01


7 ML


 


 Calcium Carbonate/


 Glycine


  (Tums)  500 mg  PRN [id number]  PRN  3/18/20 17:45


 5/13/20 10:25 DC  





 


 Calcium Chloride


 1000 mg/Sodium


 Chloride  110 ml @ 


 220 mls/hr  1X  ONCE  3/17/20 22:30


 3/17/20 22:59 DC 3/17/20 22:11


220 MLS/HR


 


 Calcium Chloride


 3000 mg/Sodium


 Chloride  1,030 ml @ 


 50 mls/hr  [id number]  3/19/20 08:00


 3/21/20 15:23 DC 3/21/20 02:17


50 MLS/HR


 


 Calcium Gluconate


  (Calcium


 Gluconate)  2,000 mg  1X  ONCE  3/19/20 02:15


 3/19/20 02:16 DC 3/19/20 02:19


2,000 MG


 


 Calcium Gluconate


 1000 mg/Sodium


 Chloride  110 ml @ 


 220 mls/hr  1X  ONCE  3/18/20 03:30


 3/18/20 03:59 DC 3/18/20 03:21


220 MLS/HR


 


 Calcium Gluconate


 2000 mg/Sodium


 Chloride  120 ml @ 


 220 mls/hr  1X  ONCE  3/18/20 07:30


 3/18/20 08:02 DC 3/18/20 09:05


220 MLS/HR


 


 Cefepime HCl


  (Maxipime)  2 gm  Q12HR  3/25/20 09:00


 4/8/20 09:58 DC 4/7/20 20:56


2 GM


 


 Cellulose


  (Surgicel


 Fibrillar 1x2)  1 each  STK-MED ONCE  4/6/20 11:00


 4/6/20 11:01 DC  





 


 Cellulose


  (Surgicel


 Hemostat 2x14)  1 each  STK-MED ONCE  4/27/20 10:58


 4/27/20 10:59 DC  





 


 Cellulose


  (Surgicel


 Hemostat 4x8)  1 each  STK-MED ONCE  4/27/20 10:58


 4/27/20 10:59 DC  





 


 Cyclobenzaprine


 HCl


  (Flexeril)  10 mg  PRN Q6HRS  PRN  4/30/20 10:45


     





 


 Daptomycin 430 mg/


 Sodium Chloride  50 ml @ 


 100 mls/hr  Q24H  4/25/20 13:00


 4/30/20 20:58 DC 4/30/20 13:00


100 MLS/HR


 


 Daptomycin 450 mg/


 Sodium Chloride  50 ml @ 


 100 mls/hr  Q24H  5/17/20 09:00


    5/18/20 08:08


100 MLS/HR


 


 Daptomycin 485 mg/


 Sodium Chloride  50 ml @ 


 100 mls/hr  Q24H  5/4/20 11:00


 5/12/20 07:44 DC 5/11/20 13:10


100 MLS/HR


 


 Daptomycin 500 mg/


 Sodium Chloride  50 ml @ 


 100 mls/hr  Q48H  3/25/20 08:30


 4/10/20 10:07 DC 4/10/20 09:57


100 MLS/HR


 


 Dexamethasone


 Sodium Phosphate


  (Decadron)  4 mg  STK-MED ONCE  4/27/20 10:56


 4/27/20 10:57 DC  





 


 Dexmedetomidine


 HCl 400 mcg/


 Sodium Chloride  100 ml @ 0


 mls/hr  CONT  PRN  4/2/20 08:15


    5/18/20 05:51


16.1 MLS/HR


 


 Dextrose


  (Dextrose


 50%-Water Syringe)  12.5 gm  PRN Q15MIN  PRN  3/16/20 09:30


     





 


 Digoxin


  (Lanoxin)  125 mcg  1X  ONCE  3/19/20 18:00


 3/19/20 18:01 DC 3/19/20 17:10


125 MCG


 


 Diphenhydramine


 HCl


  (Benadryl)  25 mg  1X PRN  PRN  4/24/20 15:45


 4/25/20 15:44 DC  





 


 Duloxetine HCl


  (Cymbalta)  30 mg  DAILY  5/10/20 14:00


 5/13/20 10:25 DC 5/11/20 09:48


30 MG


 


 Enoxaparin Sodium


  (Lovenox 100mg


 Syringe)  100 mg  Q12HR  4/21/20 21:00


   UNV  





 


 Enoxaparin Sodium


  (Lovenox 40mg


 Syringe)  40 mg  Q24H  5/17/20 17:00


    5/17/20 17:48


40 MG


 


 Etomidate


  (Amidate)  8 mg  1X  ONCE  3/23/20 08:30


 3/23/20 08:31 DC 3/23/20 08:33


8 MG


 


 Fentanyl Citrate


  (Fentanyl 2ml


 Vial)  100 mcg  STK-MED ONCE  4/27/20 10:56


 4/27/20 10:57 DC  





 


 Fentanyl Citrate


  (Fentanyl 5ml


 Vial)  250 mcg  1X  ONCE  5/8/20 09:15


 5/8/20 09:16 DC 5/8/20 09:30


50 MCG


 


 Furosemide


  (Lasix)  40 mg  1X  ONCE  4/13/20 14:30


 4/13/20 14:31 DC 4/13/20 14:39


40 MG


 


 Haloperidol


 Lactate


  (Haldol Inj)  3 mg  1X  ONCE  5/4/20 14:30


 5/4/20 14:31 DC 5/4/20 14:37


3 MG


 


 Heparin Sodium


  (Porcine)


  (Hep Lock Adult)  500 unit  STK-MED ONCE  4/7/20 09:29


 4/7/20 09:30 DC  





 


 Heparin Sodium


  (Porcine)


  (Heparin Sodium)  5,000 unit  Q12HR  4/27/20 21:00


 5/7/20 09:59 DC 5/6/20 20:57


5,000 UNIT


 


 Heparin Sodium


  (Porcine) 1000


 unit/Sodium


 Chloride  1,001 ml @ 


 1,001 mls/hr  1X  ONCE  4/27/20 12:00


 4/27/20 12:59 DC  





 


 Hydromorphone HCl


  (Dilaudid


 Standard PCA)  12 mg  STK-MED ONCE  5/1/20 15:50


 5/12/20 11:24 DC  





 


 Hydromorphone HCl


  (Dilaudid)  1 mg  PRN Q4HRS  PRN  5/4/20 19:00


    5/18/20 06:25


1 MG


 


 Info


  (CONTRAST GIVEN


 -- Rx MONITORING)  1 each  PRN DAILY  PRN  3/30/20 11:45


 4/1/20 11:44 DC  





 


 Info


  (Icu Electrolyte


 Protocol)  1 ea  CONT PRN  PRN  3/29/20 13:15


     





 


 Info


  (PHARMACY


 MONITORING -- do


 not chart)  1 each  PRN DAILY  PRN  4/24/20 15:45


     





 


 Info


  (Tpn Per


 Pharmacy)  1 each  PRN DAILY  PRN  3/18/20 12:30


   UNV  





 


 Insulin Human


 Lispro


  (HumaLOG)  0-9 UNITS  Q6HRS  3/16/20 09:30


    5/18/20 06:00


4 UNITS


 


 Insulin Human


 Regular


  (HumuLIN R VIAL)  5 unit  1X  ONCE  3/17/20 22:30


 3/17/20 22:31 DC 3/17/20 22:14


5 UNIT


 


 Iohexol


  (Omnipaque 240


 Mg/ml)  30 ml  1X  ONCE  3/30/20 11:30


 3/30/20 11:33 DC 3/30/20 11:30


30 ML


 


 Iohexol


  (Omnipaque 300


 Mg/ml)  50 ml  STK-MED ONCE  4/27/20 10:58


 4/27/20 10:59 DC  





 


 Iohexol


  (Omnipaque 350


 Mg/ml)  90 ml  1X  ONCE  3/16/20 03:30


 3/16/20 03:31 DC 3/16/20 03:25


90 ML


 


 Ketorolac


 Tromethamine


  (Toradol 30mg


 Vial)  30 mg  1X  ONCE  3/16/20 03:00


 3/16/20 03:01 DC 3/16/20 02:54


30 MG


 


 Lidocaine HCl


  (Buffered


 Lidocaine 1%)  6 ml  1X  ONCE  5/12/20 14:15


 5/12/20 14:16 DC 5/12/20 14:15


3 ML


 


 Lidocaine HCl


  (Glydo


  (Lidocaine) Jelly)  1 thomas  1X  ONCE  3/20/20 14:30


 3/20/20 14:31 DC 3/20/20 16:38


1 THOMAS


 


 Lidocaine HCl


  (Xylocaine-Mpf


 1% 2ml Vial)  2 ml  PRN 1X  PRN  4/27/20 07:00


 4/28/20 06:59 DC  





 


 Linezolid/Dextrose  300 ml @ 


 300 mls/hr  Q12HR  5/17/20 09:00


    5/18/20 08:08


300 MLS/HR


 


 Lorazepam


  (Ativan Inj)  2 mg  PRN Q4HRS  PRN  5/17/20 19:15


     





 


 Magnesium Sulfate  50 ml @ 25


 mls/hr  1X  ONCE  5/10/20 09:00


 5/10/20 10:59 DC 5/10/20 10:44


25 MLS/HR


 


 Meropenem 1 gm/


 Sodium Chloride  100 ml @ 


 200 mls/hr  Q8HRS  4/28/20 14:00


    5/18/20 05:51


200 MLS/HR


 


 Meropenem 500 mg/


 Sodium Chloride  50 ml @ 


 100 mls/hr  Q12H  4/8/20 10:00


 4/28/20 12:37 DC 4/28/20 10:45


100 MLS/HR


 


 Metoclopramide HCl


  (Reglan Vial)  10 mg  PRN Q3HRS  PRN  5/9/20 16:45


    5/14/20 04:25


10 MG


 


 Metoprolol


 Tartrate


  (Lopressor Vial)  5 mg  1X  ONCE  5/17/20 15:15


 5/17/20 15:16 DC 5/17/20 15:31


5 MG


 


 Metronidazole  100 ml @ 


 100 mls/hr  Q8HRS  4/14/20 10:00


 4/21/20 08:10 DC 4/21/20 06:04


100 MLS/HR


 


 Micafungin Sodium


 100 mg/Dextrose  100 ml @ 


 100 mls/hr  Q24H  5/4/20 11:00


    5/17/20 11:07


100 MLS/HR


 


 Midazolam HCl


  (Versed)  5 mg  1X  ONCE  5/8/20 09:15


 5/8/20 09:16 DC 5/8/20 09:30


1 MG


 


 Midazolam HCl 100


 mg/Sodium Chloride  100 ml @ 7


 mls/hr  CONT  PRN  3/28/20 16:00


    4/8/20 15:35


7 MLS/HR


 


 Midazolam HCl 50


 mg/Sodium Chloride  50 ml @ 0


 mls/hr  CONT  PRN  3/23/20 08:15


 3/28/20 15:59 DC 3/26/20 22:39


7 MLS/HR


 


 Morphine Sulfate


  (Morphine


 Sulfate)  2 mg  PRN Q2HR  PRN  3/16/20 05:00


 3/17/20 14:15 DC 3/17/20 12:26


2 MG


 


 Multi-Ingred


 Cream/Lotion/Oil/


 Oint


  (Artificial


 Tears Eye


 Ointment)  1 thomas  PRN Q1HR  PRN  3/25/20 17:30


    4/13/20 08:19


1 THOMAS


 


 Naloxone HCl


  (Narcan)  0.4 mg  PRN Q2MIN  PRN  4/27/20 14:30


   UNV  





 


 Norepinephrine


 Bitartrate 8 mg/


 Dextrose  258 ml @ 


 17.299 mls/


 hr  CONT  PRN  3/17/20 15:30


 4/17/20 09:19 DC 4/14/20 12:48


20.9 MLS/HR


 


 Ondansetron HCl


  (Zofran)  4 mg  STK-MED ONCE  4/27/20 10:56


 4/27/20 10:57 DC  





 


 Pantoprazole


 Sodium


  (PROTONIX VIAL


 for IV PUSH)  40 mg  DAILYAC  3/16/20 11:30


    5/18/20 08:07


40 MG


 


 Phenylephrine HCl


  (PHENYLEPHRINE


 in 0.9% NACL PF)  1 mg  STK-MED ONCE  4/27/20 12:34


 4/27/20 12:34 DC  





 


 Piperacillin Sod/


 Tazobactam Sod


 4.5 gm/Sodium


 Chloride  100 ml @ 


 200 mls/hr  1X  ONCE  3/16/20 06:00


 3/16/20 06:29 DC 3/16/20 05:44


200 MLS/HR


 


 Potassium


 Chloride 15 meq/


 Bicarbonate


 Dialysis Soln w/


 out KCl  5,007.5 ml


  @ 1,000 mls/


 hr  Q5H1M  3/29/20 20:00


 4/2/20 13:08 DC 4/1/20 18:14


1,000 MLS/HR


 


 Potassium


 Chloride 20 meq/


 Bicarbonate


 Dialysis Soln w/


 out KCl  5,010 ml @ 


 1,000 mls/hr  Q5H1M  3/25/20 16:00


 3/29/20 19:59 DC 3/29/20 14:54


1,000 MLS/HR


 


 Potassium


 Chloride 75 meq/


 Magnesium Sulfate


 15 meq/


 Multivitamins 10


 ml/Chromium/


 Copper/Manganese/


 Seleni/Zn 0.5 ml/


 Insulin Human


 Regular 15 unit/


 Total Parenteral


 Nutrition/Amino


 Acids/Dextrose/


 Fat Emulsion


 Intravenous  1,920 ml @ 


 80 mls/hr  TPN  CONT  5/9/20 22:00


 5/10/20 21:59 DC 5/9/20 22:41


80 MLS/HR


 


 Potassium


 Chloride 75 meq/


 Magnesium Sulfate


 15 meq/Calcium


 Gluconate 8 meq/


 Multivitamins 10


 ml/Chromium/


 Copper/Manganese/


 Seleni/Zn 0.5 ml/


 Insulin Human


 Regular 15 unit/


 Total Parenteral


 Nutrition/Amino


 Acids/Dextrose/


 Fat Emulsion


 Intravenous  1,920 ml @ 


 80 mls/hr  TPN  CONT  5/7/20 22:00


 5/8/20 21:59 DC 5/7/20 22:28


80 MLS/HR


 


 Potassium


 Chloride 75 meq/


 Magnesium Sulfate


 15 meq/Calcium


 Gluconate 8 meq/


 Multivitamins 10


 ml/Chromium/


 Copper/Manganese/


 Seleni/Zn 0.5 ml/


 Insulin Human


 Regular 20 unit/


 Total Parenteral


 Nutrition/Amino


 Acids/Dextrose/


 Fat Emulsion


 Intravenous  1,920 ml @ 


 80 mls/hr  TPN  CONT  5/6/20 22:00


 5/7/20 21:59 DC 5/6/20 22:00


80 MLS/HR


 


 Potassium


 Chloride 75 meq/


 Magnesium Sulfate


 15 meq/Calcium


 Gluconate 8 meq/


 Multivitamins 10


 ml/Chromium/


 Copper/Manganese/


 Seleni/Zn 0.5 ml/


 Insulin Human


 Regular 25 unit/


 Total Parenteral


 Nutrition/Amino


 Acids/Dextrose/


 Fat Emulsion


 Intravenous  1,920 ml @ 


 80 mls/hr  TPN  CONT  5/4/20 22:00


 5/5/20 21:59 DC 5/4/20 23:08


80 MLS/HR


 


 Potassium


 Chloride 75 meq/


 Magnesium Sulfate


 20 meq/Calcium


 Gluconate 10 meq/


 Multivitamins 10


 ml/Chromium/


 Copper/Manganese/


 Seleni/Zn 0.5 ml/


 Insulin Human


 Regular 25 unit/


 Total Parenteral


 Nutrition/Amino


 Acids/Dextrose/


 Fat Emulsion


 Intravenous  1,920 ml @ 


 80 mls/hr  TPN  CONT  5/3/20 22:00


 5/4/20 21:59 DC 5/3/20 22:04


80 MLS/HR


 


 Potassium


 Chloride 75 meq/


 Magnesium Sulfate


 20 meq/Calcium


 Gluconate 10 meq/


 Multivitamins 10


 ml/Chromium/


 Copper/Manganese/


 Seleni/Zn 0.5 ml/


 Insulin Human


 Regular 30 unit/


 Total Parenteral


 Nutrition/Amino


 Acids/Dextrose/


 Fat Emulsion


 Intravenous  1,920 ml @ 


 80 mls/hr  TPN  CONT  5/2/20 22:00


 5/3/20 22:00 DC 5/2/20 21:51


80 MLS/HR


 


 Potassium


 Chloride 80 meq/


 Magnesium Sulfate


 20 meq/


 Multivitamins 10


 ml/Chromium/


 Copper/Manganese/


 Seleni/Zn 0.5 ml/


 Insulin Human


 Regular 15 unit/


 Total Parenteral


 Nutrition/Amino


 Acids/Dextrose/


 Fat Emulsion


 Intravenous  1,920 ml @ 


 80 mls/hr  TPN  CONT  5/12/20 22:00


 5/13/20 21:59 DC 5/12/20 21:40


80 MLS/HR


 


 Potassium


 Chloride 80 meq/


 Magnesium Sulfate


 20 meq/


 Multivitamins 10


 ml/Chromium/


 Copper/Manganese/


 Seleni/Zn 1 ml/


 Insulin Human


 Regular 15 unit/


 Total Parenteral


 Nutrition/Amino


 Acids/Dextrose/


 Fat Emulsion


 Intravenous  1,920 ml @ 


 80 mls/hr  TPN  CONT  5/13/20 22:00


 5/14/20 21:59 DC 5/13/20 22:04


80 MLS/HR


 


 Potassium


 Chloride 90 meq/


 Magnesium Sulfate


 20 meq/


 Multivitamins 10


 ml/Chromium/


 Copper/Manganese/


 Seleni/Zn 1 ml/


 Insulin Human


 Regular 15 unit/


 Total Parenteral


 Nutrition/Amino


 Acids/Dextrose/


 Fat Emulsion


 Intravenous  1,890 ml @ 


 78.75 mls/


 hr  TPN  CONT  5/17/20 22:00


 5/18/20 21:59  5/17/20 21:34


78.75 MLS/HR


 


 Potassium


 Chloride/Water  100 ml @ 


 100 mls/hr  1X  ONCE  5/14/20 11:00


 5/14/20 11:59 DC 5/14/20 11:34


100 MLS/HR


 


 Potassium


 Phosphate 20 mmol/


 Sodium Chloride  106.6667


 ml @ 


 51.667 m...  1X  ONCE  3/25/20 13:00


 3/25/20 15:03 DC 3/25/20 12:51


51.667 MLS/HR


 


 Potassium Acetate


 30 meq/Magnesium


 Sulfate 20 meq/


 Calcium Gluconate


 10 meq/


 Multivitamins 10


 ml/Chromium/


 Copper/Manganese/


 Seleni/Zn 0.5 ml/


 Insulin Human


 Regular 30 unit/


 Potassium


 Chloride 30 meq/


 Total Parenteral


 Nutrition/Amino


 Acids/Dextrose/


 Fat Emulsion


 Intravenous  1,920 ml @ 


 80 mls/hr  TPN  CONT  5/1/20 22:00


 5/2/20 21:59 DC 5/1/20 22:34


80 MLS/HR


 


 Potassium Acetate


 55 meq/Magnesium


 Sulfate 20 meq/


 Calcium Gluconate


 10 meq/


 Multivitamins 10


 ml/Chromium/


 Copper/Manganese/


 Seleni/Zn 0.5 ml/


 Insulin Human


 Regular 30 unit/


 Total Parenteral


 Nutrition/Amino


 Acids/Dextrose/


 Fat Emulsion


 Intravenous  1,920 ml @ 


 80 mls/hr  TPN  CONT  4/30/20 22:00


 5/1/20 21:59 DC 5/1/20 01:00


80 MLS/HR


 


 Potassium Acetate


 55 meq/Magnesium


 Sulfate 20 meq/


 Calcium Gluconate


 10 meq/


 Multivitamins 10


 ml/Chromium/


 Copper/Manganese/


 Seleni/Zn 0.5 ml/


 Insulin Human


 Regular 35 unit/


 Total Parenteral


 Nutrition/Amino


 Acids/Dextrose/


 Fat Emulsion


 Intravenous  1,920 ml @ 


 80 mls/hr  TPN  CONT  4/28/20 22:00


 4/29/20 21:59 DC 4/28/20 22:02


80 MLS/HR


 


 Potassium Acetate


 65 meq/Magnesium


 Sulfate 20 meq/


 Calcium Gluconate


 10 meq/


 Multivitamins 10


 ml/Chromium/


 Copper/Manganese/


 Seleni/Zn 0.5 ml/


 Insulin Human


 Regular 30 unit/


 Total Parenteral


 Nutrition/Amino


 Acids/Dextrose/


 Fat Emulsion


 Intravenous  1,920 ml @ 


 80 mls/hr  TPN  CONT  4/29/20 22:00


 4/30/20 21:59 DC 4/29/20 22:22


80 MLS/HR


 


 Prochlorperazine


 Edisylate


  (Compazine)  5 mg  PACU PRN  PRN  4/27/20 07:00


 4/28/20 06:59 DC  





 


 Propofol  20 ml @ As


 Directed  STK-MED ONCE  4/27/20 12:26


 4/27/20 12:27 DC  





 


 Ringer's Solution  1,000 ml @ 


 30 mls/hr  Q24H  4/27/20 07:00


 4/27/20 18:59 DC  





 


 Rocuronium Bromide


  (Zemuron)  50 mg  STK-MED ONCE  4/27/20 10:56


 4/27/20 10:57 DC  





 


 Sevoflurane


  (Ultane)  60 ml  STK-MED ONCE  4/27/20 12:26


 4/27/20 12:27 DC  





 


 Sodium


 Bicarbonate 50


 meq/Sodium


 Chloride  1,050 ml @ 


 75 mls/hr  Q14H  3/18/20 07:30


 3/23/20 10:28 DC 3/22/20 21:10


75 MLS/HR


 


 Sodium Acetate 50


 meq/Potassium


 Acetate 55 meq/


 Magnesium Sulfate


 20 meq/Calcium


 Gluconate 10 meq/


 Multivitamins 10


 ml/Chromium/


 Copper/Manganese/


 Seleni/Zn 0.5 ml/


 Insulin Human


 Regular 35 unit/


 Total Parenteral


 Nutrition/Amino


 Acids/Dextrose/


 Fat Emulsion


 Intravenous  1,800 ml @ 


 75 mls/hr  TPN  CONT  4/25/20 22:00


 4/26/20 21:59 DC 4/25/20 22:03


75 MLS/HR


 


 Sodium Chloride  1,000 ml @ 


 25 mls/hr  Q24H  4/27/20 14:30


   UNV  





 


 Sodium Chloride


  (Normal Saline


 Flush)  3 ml  QSHIFT  PRN  4/27/20 13:45


     





 


 Sodium Chloride


 90 meq/Calcium


 Gluconate 10 meq/


 Multivitamins 10


 ml/Chromium/


 Copper/Manganese/


 Seleni/Zn 0.5 ml/


 Total Parenteral


 Nutrition/Amino


 Acids/Dextrose/


 Fat Emulsion


 Intravenous  1,512 ml @ 


 63 mls/hr  TPN  CONT  3/18/20 22:00


 3/19/20 21:59 DC 3/18/20 22:06


63 MLS/HR


 


 Sodium Chloride


 90 meq/Calcium


 Gluconate 10 meq/


 Multivitamins 10


 ml/Chromium/


 Copper/Manganese/


 Seleni/Zn 1 ml/


 Total Parenteral


 Nutrition/Amino


 Acids/Dextrose/


 Fat Emulsion


 Intravenous  55.005 ml


  @ 2.292


 mls/hr  TPN  CONT  3/18/20 22:00


 3/18/20 12:33 DC  





 


 Sodium Chloride


 90 meq/Magnesium


 Sulfate 10 meq/


 Calcium Gluconate


 20 meq/


 Multivitamins 10


 ml/Chromium/


 Copper/Manganese/


 Seleni/Zn 0.5 ml/


 Total Parenteral


 Nutrition/Amino


 Acids/Dextrose/


 Fat Emulsion


 Intravenous  1,512 ml @ 


 63 mls/hr  TPN  CONT  3/19/20 22:00


 3/20/20 21:59 DC 3/19/20 22:25


63 MLS/HR


 


 Sodium Chloride


 90 meq/Magnesium


 Sulfate 12 meq/


 Calcium Gluconate


 15 meq/


 Multivitamins 10


 ml/Chromium/


 Copper/Manganese/


 Seleni/Zn 0.5 ml/


 Insulin Human


 Regular 25 unit/


 Total Parenteral


 Nutrition/Amino


 Acids/Dextrose/


 Fat Emulsion


 Intravenous  1,400 ml @ 


 58.333 mls/


 hr  TPN  CONT  4/8/20 22:00


 4/9/20 21:59 DC 4/8/20 21:41


58.333 MLS/HR


 


 Sodium Chloride


 90 meq/Potassium


 Chloride 15 meq/


 Magnesium Sulfate


 12 meq/Calcium


 Gluconate 15 meq/


 Multivitamins 10


 ml/Chromium/


 Copper/Manganese/


 Seleni/Zn 0.5 ml/


 Insulin Human


 Regular 25 unit/


 Total Parenteral


 Nutrition/Amino


 Acids/Dextrose/


 Fat Emulsion


 Intravenous  1,400 ml @ 


 58.333 mls/


 hr  TPN  CONT  4/7/20 22:00


 4/8/20 21:59 DC 4/7/20 22:13


58.333 MLS/HR


 


 Sodium Chloride


 90 meq/Potassium


 Chloride 15 meq/


 Potassium


 Phosphate 10 mmol/


 Magnesium Sulfate


 8 meq/Calcium


 Gluconate 15 meq/


 Multivitamins 10


 ml/Chromium/


 Copper/Manganese/


 Seleni/Zn 0.5 ml/


 Insulin Human


 Regular 25 unit/


 Total Parenteral


 Nutrition/Amino


 Acids/Dextrose/


 Fat Emulsion


 Intravenous  1,400 ml @ 


 58.333 mls/


 hr  TPN  CONT  4/5/20 22:00


 4/6/20 21:59 DC 4/5/20 21:20


58.333 MLS/HR


 


 Sodium Chloride


 90 meq/Potassium


 Chloride 15 meq/


 Potassium


 Phosphate 10 mmol/


 Magnesium Sulfate


 10 meq/Calcium


 Gluconate 20 meq/


 Multivitamins 10


 ml/Chromium/


 Copper/Manganese/


 Seleni/Zn 0.5 ml/


 Total Parenteral


 Nutrition/Amino


 Acids/Dextrose/


 Fat Emulsion


 Intravenous  1,400 ml @ 


 58.333 mls/


 hr  TPN  CONT  3/23/20 22:00


 3/24/20 21:59 DC 3/23/20 21:42


58.333 MLS/HR


 


 Sodium Chloride


 90 meq/Potassium


 Chloride 15 meq/


 Potassium


 Phosphate 10 mmol/


 Magnesium Sulfate


 12 meq/Calcium


 Gluconate 15 meq/


 Multivitamins 10


 ml/Chromium/


 Copper/Manganese/


 Seleni/Zn 0.5 ml/


 Insulin Human


 Regular 25 unit/


 Total Parenteral


 Nutrition/Amino


 Acids/Dextrose/


 Fat Emulsion


 Intravenous  1,400 ml @ 


 58.333 mls/


 hr  TPN  CONT  4/6/20 22:00


 4/7/20 21:59 DC 4/6/20 22:24


58.333 MLS/HR


 


 Sodium Chloride


 90 meq/Potassium


 Chloride 15 meq/


 Potassium


 Phosphate 15 mmol/


 Magnesium Sulfate


 10 meq/Calcium


 Gluconate 15 meq/


 Multivitamins 10


 ml/Chromium/


 Copper/Manganese/


 Seleni/Zn 0.5 ml/


 Total Parenteral


 Nutrition/Amino


 Acids/Dextrose/


 Fat Emulsion


 Intravenous  1,400 ml @ 


 58.333 mls/


 hr  TPN  CONT  3/24/20 22:00


 3/25/20 21:59 DC 3/24/20 22:17


58.333 MLS/HR


 


 Sodium Chloride


 90 meq/Potassium


 Chloride 15 meq/


 Potassium


 Phosphate 15 mmol/


 Magnesium Sulfate


 10 meq/Calcium


 Gluconate 20 meq/


 Multivitamins 10


 ml/Chromium/


 Copper/Manganese/


 Seleni/Zn 0.5 ml/


 Total Parenteral


 Nutrition/Amino


 Acids/Dextrose/


 Fat Emulsion


 Intravenous  1,200 ml @ 


 50 mls/hr  TPN  CONT  3/22/20 22:00


 3/22/20 14:17 DC  





 


 Sodium Chloride


 90 meq/Potassium


 Chloride 15 meq/


 Potassium


 Phosphate 18 mmol/


 Magnesium Sulfate


 8 meq/Calcium


 Gluconate 15 meq/


 Multivitamins 10


 ml/Chromium/


 Copper/Manganese/


 Seleni/Zn 0.5 ml/


 Insulin Human


 Regular 10 unit/


 Total Parenteral


 Nutrition/Amino


 Acids/Dextrose/


 Fat Emulsion


 Intravenous  1,400 ml @ 


 58.333 mls/


 hr  TPN  CONT  3/27/20 22:00


 3/28/20 21:59 DC 3/27/20 21:43


58.333 MLS/HR


 


 Sodium Chloride


 90 meq/Potassium


 Chloride 15 meq/


 Potassium


 Phosphate 18 mmol/


 Magnesium Sulfate


 8 meq/Calcium


 Gluconate 15 meq/


 Multivitamins 10


 ml/Chromium/


 Copper/Manganese/


 Seleni/Zn 0.5 ml/


 Insulin Human


 Regular 15 unit/


 Total Parenteral


 Nutrition/Amino


 Acids/Dextrose/


 Fat Emulsion


 Intravenous  1,400 ml @ 


 58.333 mls/


 hr  TPN  CONT  3/30/20 22:00


 3/31/20 21:59 DC 3/30/20 21:47


58.333 MLS/HR


 


 Sodium Chloride


 90 meq/Potassium


 Chloride 15 meq/


 Potassium


 Phosphate 18 mmol/


 Magnesium Sulfate


 8 meq/Calcium


 Gluconate 15 meq/


 Multivitamins 10


 ml/Chromium/


 Copper/Manganese/


 Seleni/Zn 0.5 ml/


 Insulin Human


 Regular 20 unit/


 Total Parenteral


 Nutrition/Amino


 Acids/Dextrose/


 Fat Emulsion


 Intravenous  1,400 ml @ 


 58.333 mls/


 hr  TPN  CONT  4/2/20 22:00


 4/3/20 21:59 DC 4/2/20 22:45


58.333 MLS/HR


 


 Sodium Chloride


 90 meq/Potassium


 Chloride 15 meq/


 Potassium


 Phosphate 18 mmol/


 Magnesium Sulfate


 8 meq/Calcium


 Gluconate 15 meq/


 Multivitamins 10


 ml/Chromium/


 Copper/Manganese/


 Seleni/Zn 0.5 ml/


 Total Parenteral


 Nutrition/Amino


 Acids/Dextrose/


 Fat Emulsion


 Intravenous  1,400 ml @ 


 58.333 mls/


 hr  TPN  CONT  3/26/20 22:00


 3/27/20 21:59 DC 3/26/20 22:00


58.333 MLS/HR


 


 Sodium Chloride


 90 meq/Potassium


 Phosphate 15 mmol/


 Magnesium Sulfate


 12 meq/Calcium


 Gluconate 15 meq/


 Multivitamins 10


 ml/Chromium/


 Copper/Manganese/


 Seleni/Zn 0.5 ml/


 Insulin Human


 Regular 30 unit/


 Total Parenteral


 Nutrition/Amino


 Acids/Dextrose/


 Fat Emulsion


 Intravenous  1,400 ml @ 


 58.333 mls/


 hr  TPN  CONT  4/10/20 22:00


 4/11/20 21:59 DC 4/10/20 21:49


58.333 MLS/HR


 


 Sodium Chloride


 90 meq/Potassium


 Phosphate 15 mmol/


 Magnesium Sulfate


 12 meq/Calcium


 Gluconate 15 meq/


 Multivitamins 10


 ml/Chromium/


 Copper/Manganese/


 Seleni/Zn 0.5 ml/


 Insulin Human


 Regular 40 unit/


 Total Parenteral


 Nutrition/Amino


 Acids/Dextrose/


 Fat Emulsion


 Intravenous  1,400 ml @ 


 58.333 mls/


 hr  TPN  CONT  4/11/20 22:00


 4/12/20 21:59 DC 4/11/20 21:21


58.333 MLS/HR


 


 Sodium Chloride


 90 meq/Potassium


 Phosphate 19 mmol/


 Magnesium Sulfate


 12 meq/Calcium


 Gluconate 15 meq/


 Multivitamins 10


 ml/Chromium/


 Copper/Manganese/


 Seleni/Zn 0.5 ml/


 Insulin Human


 Regular 40 unit/


 Total Parenteral


 Nutrition/Amino


 Acids/Dextrose/


 Fat Emulsion


 Intravenous  1,400 ml @ 


 58.333 mls/


 hr  TPN  CONT  4/12/20 22:00


 4/13/20 21:59 DC 4/12/20 21:54


58.333 MLS/HR


 


 Sodium Chloride


 90 meq/Potassium


 Phosphate 5 mmol/


 Magnesium Sulfate


 12 meq/Calcium


 Gluconate 15 meq/


 Multivitamins 10


 ml/Chromium/


 Copper/Manganese/


 Seleni/Zn 0.5 ml/


 Insulin Human


 Regular 30 unit/


 Total Parenteral


 Nutrition/Amino


 Acids/Dextrose/


 Fat Emulsion


 Intravenous  1,400 ml @ 


 58.333 mls/


 hr  TPN  CONT  4/9/20 22:00


 4/10/20 21:59 DC 4/9/20 22:08


58.333 MLS/HR


 


 Sodium Chloride


 100 meq/Potassium


 Chloride 40 meq/


 Magnesium Sulfate


 15 meq/Calcium


 Gluconate 15 meq/


 Multivitamins 10


 ml/Chromium/


 Copper/Manganese/


 Seleni/Zn 0.5 ml/


 Insulin Human


 Regular 35 unit/


 Total Parenteral


 Nutrition/Amino


 Acids/Dextrose/


 Fat Emulsion


 Intravenous  1,400 ml @ 


 58.333 mls/


 hr  TPN  CONT  4/19/20 22:00


 4/20/20 21:59 DC 4/19/20 22:46


58.333 MLS/HR


 


 Sodium Chloride


 100 meq/Potassium


 Chloride 40 meq/


 Magnesium Sulfate


 20 meq/Calcium


 Gluconate 10 meq/


 Multivitamins 10


 ml/Chromium/


 Copper/Manganese/


 Seleni/Zn 0.5 ml/


 Insulin Human


 Regular 35 unit/


 Total Parenteral


 Nutrition/Amino


 Acids/Dextrose/


 Fat Emulsion


 Intravenous  1,400 ml @ 


 58.333 mls/


 hr  TPN  CONT  4/23/20 22:00


 4/24/20 21:59 DC 4/24/20 00:06


58.333 MLS/HR


 


 Sodium Chloride


 100 meq/Potassium


 Chloride 40 meq/


 Magnesium Sulfate


 20 meq/Calcium


 Gluconate 15 meq/


 Multivitamins 10


 ml/Chromium/


 Copper/Manganese/


 Seleni/Zn 0.5 ml/


 Insulin Human


 Regular 35 unit/


 Total Parenteral


 Nutrition/Amino


 Acids/Dextrose/


 Fat Emulsion


 Intravenous  1,400 ml @ 


 58.333 mls/


 hr  TPN  CONT  4/22/20 22:00


 4/23/20 21:59 DC 4/22/20 22:27


58.333 MLS/HR


 


 Sodium Chloride


 100 meq/Potassium


 Phosphate 10 mmol/


 Magnesium Sulfate


 12 meq/Calcium


 Gluconate 15 meq/


 Multivitamins 10


 ml/Chromium/


 Copper/Manganese/


 Seleni/Zn 0.5 ml/


 Insulin Human


 Regular 35 unit/


 Potassium


 Chloride 20 meq/


 Total Parenteral


 Nutrition/Amino


 Acids/Dextrose/


 Fat Emulsion


 Intravenous  1,400 ml @ 


 58.333 mls/


 hr  TPN  CONT  4/16/20 22:00


 4/17/20 21:59 DC 4/16/20 22:10


58.333 MLS/HR


 


 Sodium Chloride


 100 meq/Potassium


 Phosphate 19 mmol/


 Magnesium Sulfate


 12 meq/Calcium


 Gluconate 15 meq/


 Multivitamins 10


 ml/Chromium/


 Copper/Manganese/


 Seleni/Zn 0.5 ml/


 Insulin Human


 Regular 40 unit/


 Potassium


 Chloride 20 meq/


 Total Parenteral


 Nutrition/Amino


 Acids/Dextrose/


 Fat Emulsion


 Intravenous  1,400 ml @ 


 58.333 mls/


 hr  TPN  CONT  4/15/20 22:00


 4/16/20 21:59 DC 4/15/20 21:20


58.333 MLS/HR


 


 Sodium Chloride


 100 meq/Potassium


 Phosphate 5 mmol/


 Magnesium Sulfate


 12 meq/Calcium


 Gluconate 15 meq/


 Multivitamins 10


 ml/Chromium/


 Copper/Manganese/


 Seleni/Zn 0.5 ml/


 Insulin Human


 Regular 35 unit/


 Potassium


 Chloride 20 meq/


 Total Parenteral


 Nutrition/Amino


 Acids/Dextrose/


 Fat Emulsion


 Intravenous  1,400 ml @ 


 58.333 mls/


 hr  TPN  CONT  4/17/20 22:00


 4/18/20 21:59 DC 4/17/20 22:59


58.333 MLS/HR


 


 Succinylcholine


 Chloride


  (Anectine)  120 mg  1X  ONCE  3/23/20 08:30


 3/23/20 08:31 DC 3/23/20 08:34


120 MG


 


 Vecuronium Bromide


  (Norcuron Bolus)  6 mg  PRN Q6HRS  PRN  5/7/20 19:15


 5/7/20 19:35 DC  














Labs:


Lab





Laboratory Tests








Test


 5/17/20


12:00 5/17/20


17:46 5/17/20


23:47 5/18/20


05:56


 


Glucose (Fingerstick)


 225 mg/dL


(70-99) 145 mg/dL


(70-99) 240 mg/dL


(70-99) 162 mg/dL


(70-99)


 


Test


 5/18/20


06:00 


 


 





 


White Blood Count


 13.8 x10^3/uL


(4.0-11.0) 


 


 





 


Red Blood Count


 2.81 x10^6/uL


(3.50-5.40) 


 


 





 


Hemoglobin


 7.9 g/dL


(12.0-15.5) 


 


 





 


Hematocrit


 24.9 %


(36.0-47.0) 


 


 





 


Mean Corpuscular Volume 89 fL ()    


 


Mean Corpuscular Hemoglobin 28 pg (25-35)    


 


Mean Corpuscular Hemoglobin


Concent 32 g/dL


(31-37) 


 


 





 


Red Cell Distribution Width


 18.1 %


(11.5-14.5) 


 


 





 


Platelet Count


 486 x10^3/uL


(140-400) 


 


 





 


Neutrophils (%) (Auto) 72 % (31-73)    


 


Lymphocytes (%) (Auto) 21 % (24-48)    


 


Monocytes (%) (Auto) 6 % (0-9)    


 


Eosinophils (%) (Auto) 1 % (0-3)    


 


Basophils (%) (Auto) 0 % (0-3)    


 


Neutrophils # (Auto)


 10.0 x10^3/uL


(1.8-7.7) 


 


 





 


Lymphocytes # (Auto)


 2.9 x10^3/uL


(1.0-4.8) 


 


 





 


Monocytes # (Auto)


 0.8 x10^3/uL


(0.0-1.1) 


 


 





 


Eosinophils # (Auto)


 0.1 x10^3/uL


(0.0-0.7) 


 


 





 


Basophils # (Auto)


 0.0 x10^3/uL


(0.0-0.2) 


 


 





 


Sodium Level


 141 mmol/L


(136-145) 


 


 





 


Potassium Level


 4.8 mmol/L


(3.5-5.1) 


 


 





 


Chloride Level


 99 mmol/L


() 


 


 





 


Carbon Dioxide Level


 42 mmol/L


(21-32) 


 


 





 


Anion Gap 0 (6-14)    


 


Blood Urea Nitrogen


 26 mg/dL


(7-20) 


 


 





 


Creatinine


 0.6 mg/dL


(0.6-1.0) 


 


 





 


Estimated GFR


(Cockcroft-Gault) 106.3 


 


 


 





 


Glucose Level


 164 mg/dL


(70-99) 


 


 





 


Calcium Level


 9.3 mg/dL


(8.5-10.1) 


 


 





 


Phosphorus Level


 4.3 mg/dL


(2.6-4.7) 


 


 





 


Magnesium Level


 2.0 mg/dL


(1.8-2.4) 


 


 





 


Triglycerides Level


 221 mg/dL


(0-150) 


 


 














Objective:


Assessment:


Fever intermittent could be from underlying pancreatitis, pattern improving


Acute pancreatitis with persistent  necrosis


CT a/p 4/9


    Increased ascites. Persistent evidence of necrotizing pancreatitis with 

fluid and phlegmon


   at the pancreas


   4/27 status post ROBERT drain placement; 


Cholelithiasis with thickening of the gallbladder wall.


Leucocytosis improving


JED,Hyperkalemia, Metabolic acidosis off dialysis


Acute hypoxic resp failure ,bilateral pleural effusion and atelectasis


hypocalcemia 


Prediabetes


HTN


s/p trach





Plan:


Plan of Care





 Zyvox/Dapto 5/17


cont merrem 4/8


Continue micafungin 5/4


f/u cultures 


Maintain aspiration precaution


Supportive care





D/w nursing











IVAN FRANZ MD           May 18, 2020 09:06

## 2020-05-19 VITALS — SYSTOLIC BLOOD PRESSURE: 104 MMHG | DIASTOLIC BLOOD PRESSURE: 61 MMHG

## 2020-05-19 VITALS — DIASTOLIC BLOOD PRESSURE: 63 MMHG | SYSTOLIC BLOOD PRESSURE: 114 MMHG

## 2020-05-19 VITALS — DIASTOLIC BLOOD PRESSURE: 67 MMHG | SYSTOLIC BLOOD PRESSURE: 135 MMHG

## 2020-05-19 VITALS — DIASTOLIC BLOOD PRESSURE: 71 MMHG | SYSTOLIC BLOOD PRESSURE: 112 MMHG

## 2020-05-19 VITALS — SYSTOLIC BLOOD PRESSURE: 92 MMHG | DIASTOLIC BLOOD PRESSURE: 51 MMHG

## 2020-05-19 VITALS — DIASTOLIC BLOOD PRESSURE: 63 MMHG | SYSTOLIC BLOOD PRESSURE: 115 MMHG

## 2020-05-19 VITALS — SYSTOLIC BLOOD PRESSURE: 95 MMHG | DIASTOLIC BLOOD PRESSURE: 51 MMHG

## 2020-05-19 VITALS — DIASTOLIC BLOOD PRESSURE: 57 MMHG | SYSTOLIC BLOOD PRESSURE: 90 MMHG

## 2020-05-19 VITALS — DIASTOLIC BLOOD PRESSURE: 61 MMHG | SYSTOLIC BLOOD PRESSURE: 99 MMHG

## 2020-05-19 VITALS — SYSTOLIC BLOOD PRESSURE: 108 MMHG | DIASTOLIC BLOOD PRESSURE: 85 MMHG

## 2020-05-19 VITALS — SYSTOLIC BLOOD PRESSURE: 112 MMHG | DIASTOLIC BLOOD PRESSURE: 71 MMHG

## 2020-05-19 VITALS — DIASTOLIC BLOOD PRESSURE: 74 MMHG | SYSTOLIC BLOOD PRESSURE: 108 MMHG

## 2020-05-19 VITALS — SYSTOLIC BLOOD PRESSURE: 101 MMHG | DIASTOLIC BLOOD PRESSURE: 66 MMHG

## 2020-05-19 VITALS — DIASTOLIC BLOOD PRESSURE: 48 MMHG | SYSTOLIC BLOOD PRESSURE: 88 MMHG

## 2020-05-19 VITALS — DIASTOLIC BLOOD PRESSURE: 74 MMHG | SYSTOLIC BLOOD PRESSURE: 114 MMHG

## 2020-05-19 VITALS — DIASTOLIC BLOOD PRESSURE: 60 MMHG | SYSTOLIC BLOOD PRESSURE: 95 MMHG

## 2020-05-19 VITALS — SYSTOLIC BLOOD PRESSURE: 113 MMHG | DIASTOLIC BLOOD PRESSURE: 65 MMHG

## 2020-05-19 VITALS — DIASTOLIC BLOOD PRESSURE: 61 MMHG | SYSTOLIC BLOOD PRESSURE: 96 MMHG

## 2020-05-19 VITALS — DIASTOLIC BLOOD PRESSURE: 50 MMHG | SYSTOLIC BLOOD PRESSURE: 103 MMHG

## 2020-05-19 VITALS — DIASTOLIC BLOOD PRESSURE: 59 MMHG | SYSTOLIC BLOOD PRESSURE: 100 MMHG

## 2020-05-19 VITALS — DIASTOLIC BLOOD PRESSURE: 48 MMHG | SYSTOLIC BLOOD PRESSURE: 94 MMHG

## 2020-05-19 VITALS — SYSTOLIC BLOOD PRESSURE: 74 MMHG | DIASTOLIC BLOOD PRESSURE: 43 MMHG

## 2020-05-19 VITALS — DIASTOLIC BLOOD PRESSURE: 64 MMHG | SYSTOLIC BLOOD PRESSURE: 107 MMHG

## 2020-05-19 VITALS — DIASTOLIC BLOOD PRESSURE: 77 MMHG | SYSTOLIC BLOOD PRESSURE: 126 MMHG

## 2020-05-19 LAB
BASE EXCESS ABG: 20 MMOL/L (ref -3–3)
BASE EXCESS ABG: 22 MMOL/L (ref -3–3)
BASOPHILS # BLD AUTO: 0 X10^3/UL (ref 0–0.2)
BASOPHILS NFR BLD: 0 % (ref 0–3)
EOSINOPHIL NFR BLD: 0.1 X10^3/UL (ref 0–0.7)
EOSINOPHIL NFR BLD: 1 % (ref 0–3)
ERYTHROCYTE [DISTWIDTH] IN BLOOD BY AUTOMATED COUNT: 18.1 % (ref 11.5–14.5)
HCO3 BLDA-SCNC: 42 MMOL/L (ref 21–28)
HCO3 BLDA-SCNC: 49 MMOL/L (ref 21–28)
HCT VFR BLD CALC: 22.7 % (ref 36–47)
HGB BLD-MCNC: 7.4 G/DL (ref 12–15.5)
INSPIRATION SETTING TIME VENT: 32
INSPIRATION SETTING TIME VENT: 40
LYMPHOCYTES # BLD: 2.1 X10^3/UL (ref 1–4.8)
LYMPHOCYTES NFR BLD AUTO: 19 % (ref 24–48)
MCH RBC QN AUTO: 29 PG (ref 25–35)
MCHC RBC AUTO-ENTMCNC: 32 G/DL (ref 31–37)
MCV RBC AUTO: 88 FL (ref 79–100)
MONO #: 0.7 X10^3/UL (ref 0–1.1)
MONOCYTES NFR BLD: 7 % (ref 0–9)
NEUT #: 8.3 X10^3/UL (ref 1.8–7.7)
NEUTROPHILS NFR BLD AUTO: 74 % (ref 31–73)
PCO2 BLDA: 37 MMHG (ref 35–46)
PCO2 BLDA: 78 MMHG (ref 35–46)
PLATELET # BLD AUTO: 485 X10^3/UL (ref 140–400)
PO2 BLDA: 122 MMHG (ref 75–108)
PO2 BLDA: 80 MMHG (ref 75–108)
RBC # BLD AUTO: 2.58 X10^6/UL (ref 3.5–5.4)
SAO2 % BLDA: 97 % (ref 92–99)
SAO2 % BLDA: 98 % (ref 92–99)
WBC # BLD AUTO: 11.3 X10^3/UL (ref 4–11)

## 2020-05-19 RX ADMIN — BACITRACIN SCH MLS/HR: 5000 INJECTION, POWDER, FOR SOLUTION INTRAMUSCULAR at 13:37

## 2020-05-19 RX ADMIN — DEXMEDETOMIDINE HYDROCHLORIDE PRN MLS/HR: 100 INJECTION, SOLUTION, CONCENTRATE INTRAVENOUS at 11:21

## 2020-05-19 RX ADMIN — ENOXAPARIN SODIUM SCH MG: 40 INJECTION SUBCUTANEOUS at 17:43

## 2020-05-19 RX ADMIN — ONDANSETRON PRN MG: 2 INJECTION INTRAMUSCULAR; INTRAVENOUS at 12:40

## 2020-05-19 RX ADMIN — INSULIN LISPRO SCH UNITS: 100 INJECTION, SOLUTION INTRAVENOUS; SUBCUTANEOUS at 17:43

## 2020-05-19 RX ADMIN — DEXTROSE SCH MLS/HR: 50 INJECTION, SOLUTION INTRAVENOUS at 11:21

## 2020-05-19 RX ADMIN — Medication PRN EACH: at 13:52

## 2020-05-19 RX ADMIN — INSULIN LISPRO SCH UNITS: 100 INJECTION, SOLUTION INTRAVENOUS; SUBCUTANEOUS at 05:33

## 2020-05-19 RX ADMIN — MEROPENEM SCH MLS/HR: 1 INJECTION, POWDER, FOR SOLUTION INTRAVENOUS at 05:30

## 2020-05-19 RX ADMIN — DEXMEDETOMIDINE HYDROCHLORIDE PRN MLS/HR: 100 INJECTION, SOLUTION, CONCENTRATE INTRAVENOUS at 16:56

## 2020-05-19 RX ADMIN — FENTANYL CITRATE PRN MCG: 50 INJECTION INTRAMUSCULAR; INTRAVENOUS at 20:28

## 2020-05-19 RX ADMIN — FUROSEMIDE SCH MG: 10 INJECTION, SOLUTION INTRAMUSCULAR; INTRAVENOUS at 14:24

## 2020-05-19 RX ADMIN — PANTOPRAZOLE SODIUM SCH MG: 40 INJECTION, POWDER, FOR SOLUTION INTRAVENOUS at 09:51

## 2020-05-19 RX ADMIN — INSULIN LISPRO SCH UNITS: 100 INJECTION, SOLUTION INTRAVENOUS; SUBCUTANEOUS at 12:34

## 2020-05-19 RX ADMIN — DEXMEDETOMIDINE HYDROCHLORIDE PRN MLS/HR: 100 INJECTION, SOLUTION, CONCENTRATE INTRAVENOUS at 23:21

## 2020-05-19 RX ADMIN — INSULIN LISPRO SCH UNITS: 100 INJECTION, SOLUTION INTRAVENOUS; SUBCUTANEOUS at 00:17

## 2020-05-19 RX ADMIN — DEXMEDETOMIDINE HYDROCHLORIDE PRN MLS/HR: 100 INJECTION, SOLUTION, CONCENTRATE INTRAVENOUS at 05:04

## 2020-05-19 RX ADMIN — FENTANYL CITRATE PRN MCG: 50 INJECTION INTRAMUSCULAR; INTRAVENOUS at 10:12

## 2020-05-19 RX ADMIN — INSULIN LISPRO SCH UNITS: 100 INJECTION, SOLUTION INTRAVENOUS; SUBCUTANEOUS at 23:44

## 2020-05-19 RX ADMIN — MEROPENEM SCH MLS/HR: 1 INJECTION, POWDER, FOR SOLUTION INTRAVENOUS at 14:24

## 2020-05-19 RX ADMIN — MEROPENEM SCH MLS/HR: 1 INJECTION, POWDER, FOR SOLUTION INTRAVENOUS at 22:30

## 2020-05-19 RX ADMIN — DAPTOMYCIN SCH MLS/HR: 500 INJECTION, POWDER, LYOPHILIZED, FOR SOLUTION INTRAVENOUS at 09:52

## 2020-05-19 NOTE — PDOC
SURGICAL PROGRESS NOTE


Subjective


Pt appears comfortable on vent


Vital Signs





Vital Signs








  Date Time  Temp Pulse Resp B/P (MAP) Pulse Ox O2 Delivery O2 Flow Rate FiO2


 


5/19/20 11:30     100 Ventilator  


 


5/19/20 11:00  141 22 135/67 (89)    


 


5/19/20 09:00 99.4       





 99.4       


 


5/18/20 23:00       3.0 








I&O











Intake and Output 


 


 5/19/20





 07:00


 


Intake Total 4094 ml


 


Output Total 5340 ml


 


Balance -1246 ml


 


 


 


Intake Oral 0 ml


 


IV Total 4094 ml


 


Output Urine Total 4575 ml


 


Gastric Drainage Total 300 ml


 


Drainage Total 465 ml


 


# Bowel Movements 1








General:  Alert, No acute distress


Abdomen:  Soft, Other (drains in place)


Labs





Laboratory Tests








Test


 5/17/20


17:46 5/17/20


23:47 5/18/20


05:56 5/18/20


06:00


 


Glucose (Fingerstick)


 145 mg/dL


(70-99) 240 mg/dL


(70-99) 162 mg/dL


(70-99) 





 


White Blood Count


 


 


 


 13.8 x10^3/uL


(4.0-11.0)


 


Red Blood Count


 


 


 


 2.81 x10^6/uL


(3.50-5.40)


 


Hemoglobin


 


 


 


 7.9 g/dL


(12.0-15.5)


 


Hematocrit


 


 


 


 24.9 %


(36.0-47.0)


 


Mean Corpuscular Volume    89 fL () 


 


Mean Corpuscular Hemoglobin    28 pg (25-35) 


 


Mean Corpuscular Hemoglobin


Concent 


 


 


 32 g/dL


(31-37)


 


Red Cell Distribution Width


 


 


 


 18.1 %


(11.5-14.5)


 


Platelet Count


 


 


 


 486 x10^3/uL


(140-400)


 


Neutrophils (%) (Auto)    72 % (31-73) 


 


Lymphocytes (%) (Auto)    21 % (24-48) 


 


Monocytes (%) (Auto)    6 % (0-9) 


 


Eosinophils (%) (Auto)    1 % (0-3) 


 


Basophils (%) (Auto)    0 % (0-3) 


 


Neutrophils # (Auto)


 


 


 


 10.0 x10^3/uL


(1.8-7.7)


 


Lymphocytes # (Auto)


 


 


 


 2.9 x10^3/uL


(1.0-4.8)


 


Monocytes # (Auto)


 


 


 


 0.8 x10^3/uL


(0.0-1.1)


 


Eosinophils # (Auto)


 


 


 


 0.1 x10^3/uL


(0.0-0.7)


 


Basophils # (Auto)


 


 


 


 0.0 x10^3/uL


(0.0-0.2)


 


Sodium Level


 


 


 


 141 mmol/L


(136-145)


 


Potassium Level


 


 


 


 4.8 mmol/L


(3.5-5.1)


 


Chloride Level


 


 


 


 99 mmol/L


()


 


Carbon Dioxide Level


 


 


 


 42 mmol/L


(21-32)


 


Anion Gap    0 (6-14) 


 


Blood Urea Nitrogen


 


 


 


 26 mg/dL


(7-20)


 


Creatinine


 


 


 


 0.6 mg/dL


(0.6-1.0)


 


Estimated GFR


(Cockcroft-Gault) 


 


 


 106.3 





 


Glucose Level


 


 


 


 164 mg/dL


(70-99)


 


Calcium Level


 


 


 


 9.3 mg/dL


(8.5-10.1)


 


Phosphorus Level


 


 


 


 4.3 mg/dL


(2.6-4.7)


 


Magnesium Level


 


 


 


 2.0 mg/dL


(1.8-2.4)


 


Triglycerides Level


 


 


 


 221 mg/dL


(0-150)


 


Test


 5/18/20


11:05 5/18/20


17:03 5/18/20


23:33 5/19/20


00:09


 


Glucose (Fingerstick)


 236 mg/dL


(70-99) 179 mg/dL


(70-99) 


 227 mg/dL


(70-99)


 


O2 Saturation   98 % (92-99)  


 


Arterial Blood pH


 


 


 7.42


(7.35-7.45) 





 


Arterial Blood pCO2 at


Patient Temp 


 


 78 mmHg


(35-46) 





 


Arterial Blood pO2 at Patient


Temp 


 


 122 mmHg


() 





 


Arterial Blood HCO3


 


 


 49 mmol/L


(21-28) 





 


Arterial Blood Base Excess


 


 


 22 mmol/L


(-3-3) 





 


FiO2   32  


 


Test


 5/19/20


05:10 5/19/20


05:20 5/19/20


08:00 5/19/20


12:29


 


White Blood Count


 11.3 x10^3/uL


(4.0-11.0) 


 


 





 


Red Blood Count


 2.58 x10^6/uL


(3.50-5.40) 


 


 





 


Hemoglobin


 7.4 g/dL


(12.0-15.5) 


 


 





 


Hematocrit


 22.7 %


(36.0-47.0) 


 


 





 


Mean Corpuscular Volume 88 fL ()    


 


Mean Corpuscular Hemoglobin 29 pg (25-35)    


 


Mean Corpuscular Hemoglobin


Concent 32 g/dL


(31-37) 


 


 





 


Red Cell Distribution Width


 18.1 %


(11.5-14.5) 


 


 





 


Platelet Count


 485 x10^3/uL


(140-400) 


 


 





 


Neutrophils (%) (Auto) 74 % (31-73)    


 


Lymphocytes (%) (Auto) 19 % (24-48)    


 


Monocytes (%) (Auto) 7 % (0-9)    


 


Eosinophils (%) (Auto) 1 % (0-3)    


 


Basophils (%) (Auto) 0 % (0-3)    


 


Neutrophils # (Auto)


 8.3 x10^3/uL


(1.8-7.7) 


 


 





 


Lymphocytes # (Auto)


 2.1 x10^3/uL


(1.0-4.8) 


 


 





 


Monocytes # (Auto)


 0.7 x10^3/uL


(0.0-1.1) 


 


 





 


Eosinophils # (Auto)


 0.1 x10^3/uL


(0.0-0.7) 


 


 





 


Basophils # (Auto)


 0.0 x10^3/uL


(0.0-0.2) 


 


 





 


Glucose (Fingerstick)


 


 153 mg/dL


(70-99) 


 251 mg/dL


(70-99)


 


O2 Saturation   97 % (92-99)  


 


Arterial Blood pH


 


 


 7.67


(7.35-7.45) 





 


Arterial Blood pCO2 at


Patient Temp 


 


 37 mmHg


(35-46) 





 


Arterial Blood pO2 at Patient


Temp 


 


 80 mmHg


() 





 


Arterial Blood HCO3


 


 


 42 mmol/L


(21-28) 





 


Arterial Blood Base Excess


 


 


 20 mmol/L


(-3-3) 





 


FiO2   40  








Laboratory Tests








Test


 5/18/20


17:03 5/18/20


23:33 5/19/20


00:09 5/19/20


05:10


 


Glucose (Fingerstick)


 179 mg/dL


(70-99) 


 227 mg/dL


(70-99) 





 


O2 Saturation  98 % (92-99)   


 


Arterial Blood pH


 


 7.42


(7.35-7.45) 


 





 


Arterial Blood pCO2 at


Patient Temp 


 78 mmHg


(35-46) 


 





 


Arterial Blood pO2 at Patient


Temp 


 122 mmHg


() 


 





 


Arterial Blood HCO3


 


 49 mmol/L


(21-28) 


 





 


Arterial Blood Base Excess


 


 22 mmol/L


(-3-3) 


 





 


FiO2  32   


 


White Blood Count


 


 


 


 11.3 x10^3/uL


(4.0-11.0)


 


Red Blood Count


 


 


 


 2.58 x10^6/uL


(3.50-5.40)


 


Hemoglobin


 


 


 


 7.4 g/dL


(12.0-15.5)


 


Hematocrit


 


 


 


 22.7 %


(36.0-47.0)


 


Mean Corpuscular Volume    88 fL () 


 


Mean Corpuscular Hemoglobin    29 pg (25-35) 


 


Mean Corpuscular Hemoglobin


Concent 


 


 


 32 g/dL


(31-37)


 


Red Cell Distribution Width


 


 


 


 18.1 %


(11.5-14.5)


 


Platelet Count


 


 


 


 485 x10^3/uL


(140-400)


 


Neutrophils (%) (Auto)    74 % (31-73) 


 


Lymphocytes (%) (Auto)    19 % (24-48) 


 


Monocytes (%) (Auto)    7 % (0-9) 


 


Eosinophils (%) (Auto)    1 % (0-3) 


 


Basophils (%) (Auto)    0 % (0-3) 


 


Neutrophils # (Auto)


 


 


 


 8.3 x10^3/uL


(1.8-7.7)


 


Lymphocytes # (Auto)


 


 


 


 2.1 x10^3/uL


(1.0-4.8)


 


Monocytes # (Auto)


 


 


 


 0.7 x10^3/uL


(0.0-1.1)


 


Eosinophils # (Auto)


 


 


 


 0.1 x10^3/uL


(0.0-0.7)


 


Basophils # (Auto)


 


 


 


 0.0 x10^3/uL


(0.0-0.2)


 


Test


 5/19/20


05:20 5/19/20


08:00 5/19/20


12:29 





 


Glucose (Fingerstick)


 153 mg/dL


(70-99) 


 251 mg/dL


(70-99) 





 


O2 Saturation  97 % (92-99)   


 


Arterial Blood pH


 


 7.67


(7.35-7.45) 


 





 


Arterial Blood pCO2 at


Patient Temp 


 37 mmHg


(35-46) 


 





 


Arterial Blood pO2 at Patient


Temp 


 80 mmHg


() 


 





 


Arterial Blood HCO3


 


 42 mmol/L


(21-28) 


 





 


Arterial Blood Base Excess


 


 20 mmol/L


(-3-3) 


 





 


FiO2  40   








Problem List


Problems


Medical Problems:


(1) Acute pancreatitis


Status: Acute  





(2) Cholelithiasis


Status: Acute  








Assessment/Plan


severe pancreatitis


cont supportive care, no surgical plans currently











GAMAL ZHOU MD             May 19, 2020 12:52

## 2020-05-19 NOTE — PDOC
Subjective:


Subjective:


Not feeling better.





Objective:


Objective:


D/w nurse - back on vent, pulled NG and some difficulty replacing - gets very 

anxious, asks for pain meds.


Vital Signs:





                                   Vital Signs








  Date Time  Temp Pulse Resp B/P (MAP) Pulse Ox O2 Delivery O2 Flow Rate FiO2


 


5/19/20 09:46     100 Ventilator  


 


5/19/20 09:00 99.4 113 17 108/85 (93)    





 99.4       


 


5/18/20 23:00       3.0 








Labs:





Laboratory Tests








Test


 5/18/20


11:05 5/18/20


17:03 5/18/20


23:33 5/19/20


00:09


 


Glucose (Fingerstick) 236 mg/dL  179 mg/dL   227 mg/dL 


 


O2 Saturation   98 %  


 


Arterial Blood pH   7.42  


 


Arterial Blood pCO2 at


Patient Temp 


 


 78 mmHg 


 





 


Arterial Blood pO2 at Patient


Temp 


 


 122 mmHg 


 





 


Arterial Blood HCO3   49 mmol/L  


 


Arterial Blood Base Excess   22 mmol/L  


 


FiO2   32  


 


Test


 5/19/20


05:10 5/19/20


05:20 5/19/20


08:00 





 


White Blood Count 11.3 x10^3/uL    


 


Red Blood Count 2.58 x10^6/uL    


 


Hemoglobin 7.4 g/dL    


 


Hematocrit 22.7 %    


 


Mean Corpuscular Volume 88 fL    


 


Mean Corpuscular Hemoglobin 29 pg    


 


Mean Corpuscular Hemoglobin


Concent 32 g/dL 


 


 


 





 


Red Cell Distribution Width 18.1 %    


 


Platelet Count 485 x10^3/uL    


 


Neutrophils (%) (Auto) 74 %    


 


Lymphocytes (%) (Auto) 19 %    


 


Monocytes (%) (Auto) 7 %    


 


Eosinophils (%) (Auto) 1 %    


 


Basophils (%) (Auto) 0 %    


 


Neutrophils # (Auto) 8.3 x10^3/uL    


 


Lymphocytes # (Auto) 2.1 x10^3/uL    


 


Monocytes # (Auto) 0.7 x10^3/uL    


 


Eosinophils # (Auto) 0.1 x10^3/uL    


 


Basophils # (Auto) 0.0 x10^3/uL    


 


Glucose (Fingerstick)  153 mg/dL   


 


O2 Saturation   97 %  


 


Arterial Blood pH   7.67  


 


Arterial Blood pCO2 at


Patient Temp 


 


 37 mmHg 


 





 


Arterial Blood pO2 at Patient


Temp 


 


 80 mmHg 


 





 


Arterial Blood HCO3   42 mmol/L  


 


Arterial Blood Base Excess   20 mmol/L  


 


FiO2   40  








  BLOOD CULTURE  Preliminary  


        NO GROWTH AFTER 2 DAYS





PE:





GEN: NAD - appears more comfortable than yesterday


HEENT: trach/vent


LUNGS: clear


HEART: tachycardic


ABD: stable distention


NEURO/PSYCH: A & O 3





A/P:


Gallstone pancreatitis, MOSF





--


NG out again - nurse plans to attempt replacement later today.





Hemodynamically unstable?:  No


Is patient in severe pain?:  No


Is NPO status required?:  Yes











RAGHAVENDRA MURCIA         May 19, 2020 10:03

## 2020-05-19 NOTE — PDOC
PULMONARY PROGRESS NOTES


Subjective


Patient intubated on 3/23 , s/p trach 4/6, awake alert follows commands, is 

weak, small trach secretion, trach has been capped over 48 hrs


placed on cap trach, at times patient having some respiratory distress


s/p left tap 5/12 , 3 litres removed


Vitals





Vital Signs








  Date Time  Temp Pulse Resp B/P (MAP) Pulse Ox O2 Delivery O2 Flow Rate FiO2


 


5/19/20 07:52     100 Ventilator  


 


5/19/20 07:00  109 18 115/63 (80)    


 


5/19/20 04:00 100.6       





 100.6       


 


5/18/20 23:00       3.0 








ROS:  No Chest Pain, No Abdominal Pain, No Increase Cough


General:  Alert, No acute distress


HEENT:  Other (nc at perrl     neck trach site ok  no lad  no thyromegaly)


Lungs:  Wheezing, Other (decrease bs)


Cardiovascular:  S1, S2


Abdomen:  Soft, Non-tender, Other (distended)


Neuro Exam:  Alert


Extremities:  Other (+3 generalized edema )


Skin:  Warm, Dry


Labs





Laboratory Tests








Test


 5/17/20


12:00 5/17/20


17:46 5/17/20


23:47 5/18/20


05:56


 


Glucose (Fingerstick)


 225 mg/dL


(70-99) 145 mg/dL


(70-99) 240 mg/dL


(70-99) 162 mg/dL


(70-99)


 


Test


 5/18/20


06:00 5/18/20


11:05 5/18/20


17:03 5/18/20


23:33


 


White Blood Count


 13.8 x10^3/uL


(4.0-11.0) 


 


 





 


Red Blood Count


 2.81 x10^6/uL


(3.50-5.40) 


 


 





 


Hemoglobin


 7.9 g/dL


(12.0-15.5) 


 


 





 


Hematocrit


 24.9 %


(36.0-47.0) 


 


 





 


Mean Corpuscular Volume 89 fL ()    


 


Mean Corpuscular Hemoglobin 28 pg (25-35)    


 


Mean Corpuscular Hemoglobin


Concent 32 g/dL


(31-37) 


 


 





 


Red Cell Distribution Width


 18.1 %


(11.5-14.5) 


 


 





 


Platelet Count


 486 x10^3/uL


(140-400) 


 


 





 


Neutrophils (%) (Auto) 72 % (31-73)    


 


Lymphocytes (%) (Auto) 21 % (24-48)    


 


Monocytes (%) (Auto) 6 % (0-9)    


 


Eosinophils (%) (Auto) 1 % (0-3)    


 


Basophils (%) (Auto) 0 % (0-3)    


 


Neutrophils # (Auto)


 10.0 x10^3/uL


(1.8-7.7) 


 


 





 


Lymphocytes # (Auto)


 2.9 x10^3/uL


(1.0-4.8) 


 


 





 


Monocytes # (Auto)


 0.8 x10^3/uL


(0.0-1.1) 


 


 





 


Eosinophils # (Auto)


 0.1 x10^3/uL


(0.0-0.7) 


 


 





 


Basophils # (Auto)


 0.0 x10^3/uL


(0.0-0.2) 


 


 





 


Sodium Level


 141 mmol/L


(136-145) 


 


 





 


Potassium Level


 4.8 mmol/L


(3.5-5.1) 


 


 





 


Chloride Level


 99 mmol/L


() 


 


 





 


Carbon Dioxide Level


 42 mmol/L


(21-32) 


 


 





 


Anion Gap 0 (6-14)    


 


Blood Urea Nitrogen


 26 mg/dL


(7-20) 


 


 





 


Creatinine


 0.6 mg/dL


(0.6-1.0) 


 


 





 


Estimated GFR


(Cockcroft-Gault) 106.3 


 


 


 





 


Glucose Level


 164 mg/dL


(70-99) 


 


 





 


Calcium Level


 9.3 mg/dL


(8.5-10.1) 


 


 





 


Phosphorus Level


 4.3 mg/dL


(2.6-4.7) 


 


 





 


Magnesium Level


 2.0 mg/dL


(1.8-2.4) 


 


 





 


Triglycerides Level


 221 mg/dL


(0-150) 


 


 





 


Glucose (Fingerstick)


 


 236 mg/dL


(70-99) 179 mg/dL


(70-99) 





 


O2 Saturation    98 % (92-99) 


 


Arterial Blood pH


 


 


 


 7.42


(7.35-7.45)


 


Arterial Blood pCO2 at


Patient Temp 


 


 


 78 mmHg


(35-46)


 


Arterial Blood pO2 at Patient


Temp 


 


 


 122 mmHg


()


 


Arterial Blood HCO3


 


 


 


 49 mmol/L


(21-28)


 


Arterial Blood Base Excess


 


 


 


 22 mmol/L


(-3-3)


 


FiO2    32 


 


Test


 5/19/20


00:09 5/19/20


05:10 5/19/20


05:20 





 


Glucose (Fingerstick)


 227 mg/dL


(70-99) 


 153 mg/dL


(70-99) 





 


White Blood Count


 


 11.3 x10^3/uL


(4.0-11.0) 


 





 


Red Blood Count


 


 2.58 x10^6/uL


(3.50-5.40) 


 





 


Hemoglobin


 


 7.4 g/dL


(12.0-15.5) 


 





 


Hematocrit


 


 22.7 %


(36.0-47.0) 


 





 


Mean Corpuscular Volume  88 fL ()   


 


Mean Corpuscular Hemoglobin  29 pg (25-35)   


 


Mean Corpuscular Hemoglobin


Concent 


 32 g/dL


(31-37) 


 





 


Red Cell Distribution Width


 


 18.1 %


(11.5-14.5) 


 





 


Platelet Count


 


 485 x10^3/uL


(140-400) 


 





 


Neutrophils (%) (Auto)  74 % (31-73)   


 


Lymphocytes (%) (Auto)  19 % (24-48)   


 


Monocytes (%) (Auto)  7 % (0-9)   


 


Eosinophils (%) (Auto)  1 % (0-3)   


 


Basophils (%) (Auto)  0 % (0-3)   


 


Neutrophils # (Auto)


 


 8.3 x10^3/uL


(1.8-7.7) 


 





 


Lymphocytes # (Auto)


 


 2.1 x10^3/uL


(1.0-4.8) 


 





 


Monocytes # (Auto)


 


 0.7 x10^3/uL


(0.0-1.1) 


 





 


Eosinophils # (Auto)


 


 0.1 x10^3/uL


(0.0-0.7) 


 





 


Basophils # (Auto)


 


 0.0 x10^3/uL


(0.0-0.2) 


 











Laboratory Tests








Test


 5/18/20


11:05 5/18/20


17:03 5/18/20


23:33 5/19/20


00:09


 


Glucose (Fingerstick)


 236 mg/dL


(70-99) 179 mg/dL


(70-99) 


 227 mg/dL


(70-99)


 


O2 Saturation   98 % (92-99)  


 


Arterial Blood pH


 


 


 7.42


(7.35-7.45) 





 


Arterial Blood pCO2 at


Patient Temp 


 


 78 mmHg


(35-46) 





 


Arterial Blood pO2 at Patient


Temp 


 


 122 mmHg


() 





 


Arterial Blood HCO3


 


 


 49 mmol/L


(21-28) 





 


Arterial Blood Base Excess


 


 


 22 mmol/L


(-3-3) 





 


FiO2   32  


 


Test


 5/19/20


05:10 5/19/20


05:20 


 





 


White Blood Count


 11.3 x10^3/uL


(4.0-11.0) 


 


 





 


Red Blood Count


 2.58 x10^6/uL


(3.50-5.40) 


 


 





 


Hemoglobin


 7.4 g/dL


(12.0-15.5) 


 


 





 


Hematocrit


 22.7 %


(36.0-47.0) 


 


 





 


Mean Corpuscular Volume 88 fL ()    


 


Mean Corpuscular Hemoglobin 29 pg (25-35)    


 


Mean Corpuscular Hemoglobin


Concent 32 g/dL


(31-37) 


 


 





 


Red Cell Distribution Width


 18.1 %


(11.5-14.5) 


 


 





 


Platelet Count


 485 x10^3/uL


(140-400) 


 


 





 


Neutrophils (%) (Auto) 74 % (31-73)    


 


Lymphocytes (%) (Auto) 19 % (24-48)    


 


Monocytes (%) (Auto) 7 % (0-9)    


 


Eosinophils (%) (Auto) 1 % (0-3)    


 


Basophils (%) (Auto) 0 % (0-3)    


 


Neutrophils # (Auto)


 8.3 x10^3/uL


(1.8-7.7) 


 


 





 


Lymphocytes # (Auto)


 2.1 x10^3/uL


(1.0-4.8) 


 


 





 


Monocytes # (Auto)


 0.7 x10^3/uL


(0.0-1.1) 


 


 





 


Eosinophils # (Auto)


 0.1 x10^3/uL


(0.0-0.7) 


 


 





 


Basophils # (Auto)


 0.0 x10^3/uL


(0.0-0.2) 


 


 





 


Glucose (Fingerstick)


 


 153 mg/dL


(70-99) 


 











Medications





Active Scripts








 Medications  Dose


 Route/Sig


 Max Daily Dose Days Date Category


 


 Bisoprolol


 Fumarate 5 Mg


 Tablet  10 Mg


 PO DAILY


   3/16/20 Reported








Comments


CXR  reviewed.





Impression


.


IMPRESSION:


1.  Acute hypoxemic respiratory failure secondary to ARDS status post trach,


2.  Gallstone pancreatitis


3.  Severe metabolic acidosis.stable


4.  Acute kidney injury-stable, Off HD-- continue to improve 


5.  Acute gallstone pancreatitis.


6.  Hypoalbuminemia.


7.  Moderate persistent effusions, s/p left thora  5/12


8.  Fever-  Per ID, per surgery--resolved 


9.  Chronic anemia


10. Covid 19 testing negative


11. Moderate to large ascites-S/P paracentisis


12.S/P paracentisis with 4 liters removed on 4/15/20


13. S/P IR drain placement on 5/8/2020





Plan


.


Chest x-ray reviewed, increasing pleural effusions and infiltrates





Lasix 40 mg twice daily





Patient placed on assist control last evening for respiratory distress


s/p  thoracentesis, 5/12, 3 litres removed


cap trach as tolerated


Follow surgery recs-- S/P 3 drain placed in IR on 5/8/2020


Follow ID recs for ABX


Follow nephrology recs 


Continue TPN 


DVT/GI PPX: heparin SQ/ protonix 


D/W RN and RT, pt





CODE:FULL











STEVE MIRANDA MD              May 19, 2020 08:26

## 2020-05-19 NOTE — NUR
Pharmacy TPN Dosing Note



S: JESENIA RICH is a 49 year old F Currently receiving Central Continuous TPN started 
03/18/20



B:Pertinent PMH: Necrotizing pancreatitis



Height: 5 feet, 8 inches

Weight: 83.7 kg



Current diet: NPO 



LABS:

Sodium:    141 

Potassium: 4.8 

Chloride:  99 

Calcium:   9.2 

Corrected Calcium: 10.64 

Magnesium: 2.0 

CO2:       38 

SCr:       0.6 

Glucose:   164 

Albumin:   2.2 

AST:       50 

ALT:       50 



TPN FORMULA:

TPN TYPE:  Central Continuous

AMINO ACIDS:         90 gm

DEXTROSE:            250 gm

LIPIDS:              30 gm

POTASSIUM CHLORIDE:  90 mEq

MAGNESIUM:           20 mEq

INSULIN:             15 units

MULTIPLE VITAMIN:    10 ml

TRACE ELEMENTS:      1 ml



TPN PLAN:  

-No labs today, continue same TPN.

-BMP tomorrow.





R: Continue TPN @ current rate and above formula. 

Will monitor electrolytes, glucose, and tolerance to TPN.



 VALERIE SNELL, [facility], 05/19/20 [id number]

## 2020-05-19 NOTE — NUR
SS following up with discharge planning. SS reviewed pt chart and discussed with pt RN. Pt 
CO2 was high last night and pt was placed on the vent. Pt's ABG's better today. Pt has three 
ROBERT drains. Per RN, pt pulled her NG tube out this morning and will have it replaced today. 
Pt remains on IV antibiotics and is showing improvements with PT/OT. SS left voicemail for 
[facility], [phone], requesting update on disability application. SS will continue to follow for 
discharge planning.

## 2020-05-19 NOTE — RAD
PORTABLE CHEST 1V

 

History: Ventilator, tracheostomy

 

Comparison: May 18, 2020

 

Findings:

Single view of the chest is submitted. 

There is again tracheostomy. There is again right upper extremity PICC 

with the tip likely near the cavoatrial junction right atrium, poorly 

delineated on this exam. There is again at least small left pleural 

effusion with obscuration of left hemidiaphragm, also probable at least 

small right pleural effusion unchanged. There is fairly diffuse mostly 

hazy airspace opacity of the mid to inferior right hemithorax overall 

similar and also likely atelectasis of the right perihilar region 

unchanged. There is also airspace opacity of the mid to inferior left 

hemithorax with a greater degree of consolidation at the left lung base 

unchanged. There is no pneumothorax. Heart size is similar.

 

Impression: 

 

1. Radiographic findings as stated are similar.

 

Electronically signed by: Anival Greene MD (5/19/2020 10:21 AM) 

[id number]

## 2020-05-19 NOTE — PDOC
[facility] PROGRESS NOTE


Chief Complaint


Chief Complaint


Acute hypoxic Respiratory failure requiring mechanical ventilation (now 

extubated for several days but still with tracheostomy)


Tracheostomy


bilateral pleural effusions/pulm edema 


Sepsis


Severe Acute gallstone pancreatitis (not a surgical candidate at this time) with

necrosis


Acute kidney failure now requiring dialysis


Salpingitis


Gallstones (Calculus of gallbladder with acute cholecystitis without obstruc

tion)


HTN 


Leukocytosis 


Hypoxia


Uterine fibroid


Intractable pain


Intractable nausea


Covid 19 negative. 


Acute on chronic anemia 


EEG: No seizure activityFever  - better currently - intermittent could be from 

underlying pancreatitis blood cults 5/4 - neg so far


? Ileus with vomiting


Abd distention - U/S and CT reviewed s/p 0.4 L of opaque, debris-containing 

ascites was removed 5/6


Acute pancreatitis with persistent necrosis


- 4/27 status post ROBERT drain placement + C paropsilosis. s/p additional drains 

5/8


Anemia - S/p PRBCs


Cholelithiasis with thickening of the gallbladder wall.


Leucocytosis improving


JED, hyperkalemia, Metabolic acidosis off dialysis


Acute hypoxic resp failure ,bilateral pleural effusion and atelectasis


hypocalcemia 


Prediabetes


HTN


s/p trach


ESRD on HD


Hyperglycemia





History of Present Illness


History of Present Illness


5/19/2020


Patient seen and examined in the ICU


She remains extremely critically ill


Severely encephalopathic


Had to go back on the vent last night


Currently on spontaneous respirations with 10 of pressure support and 30% FiO2


Nurses trying to suction her as she has difficulty handing her secretions


Chart reviewed


X-ray tech just took another x-ray


She is on TPN











5/18/2020


Patient seen and examined in the ICU


She had an episode yesterday of tachycardia and severe agitation we gave her 

some Ativan


After that she seemed to have stroke symptoms but now that the Ativan has wore 

off her stroke symptoms have resolved


She is a little more calm this morning but even just talking to her gets her 

agitated and anxious


She is currently on TPN and Precedex


She has NG to suction


She has Chino to bedside drainage


She has SCDs


Discussed with RN


Reviewed chart 


Remains very critically ill











5/17/2020


Patient seen and examined in the ICU


She is up in bed sleeping awoke I really do not understand what she is saying to

her but she is trying to talk


Chart reviewed


Discussed with RN


She is on IV Zosyn IV micafungin and IV daptomycin


Also getting TPN


Sedated with Precedex


She also gets Dilaudid every 4


She remains critically ill








5/16/2020


Patient seen and examined in the ICU


She is up in the bed but extremely weak and frail


Ask if there is "anything we can do"


I explained to her that we will do everything we can but that she is very ill 

and we need to give it time


Reviewed with nurse


Reviewed chart


She is on IV micafungin Lucina and meropenem


She is on IV TPN


She is also receiving Precedex for sedation


She has NG to suction and ROBERT drain x5


Chino to bedside drainage


Extremely critically ill








5/15/2020


Patient seen and examined in the ICU


She is resting comfortably but sedated


Has O2 per nasal cannula


Tracheostomy is clean


She is on Precedex TPN and has an NG to suction


Chart reviewed





5/14/2020


Patient seen and examined in the ICU


She now has a speaking valve in her trach and is able to talk


She is extremely weak and shaky


Chart reviewed discussed with RN





5/13/2020


Patient seen and examined in the ICU


She is up in the chair with O2 via trach shield


Extremely anxious


Cannot talk but is trying to write things to me although I cannot understand 

what she is writing


Discussed with RN


Chart reviewed


Still on IV TPN


Still extremely ill








5/12/2020


Patient seen and examined in the ICU


She now has a trach shield


Pleasantly confused but sedated with Precedex


Chart reviewed


Discussed with RN


She has IV TPN


Still very critically ill





5/11/2020


Patient seen and examined


She remains on the vent but spontaneous respirations with 20 of pressure support

and 30% FiO2


He still has NG to suction


Appears extremely ill and weak and confused


Has IV TPN Precedex antibiotics


Chino is to bedside drainage


She has SCDs and ProCare boots


She is on IV meropenem and daptomycin and micafungin


Chart reviewed


Discussed with RN


Patient is still critically ill








Ms Tadeo is a 50yo F w/ PMHx HTN, prediabetes who presented to the emergency 

room with complaints of abdominal pain on 3/16/2020. Found with Lipase 47215, 

, , Bilirubin 1.4.


CT abdomen confirms pancreatic inflammation, peripancreatic fluid and 

inflammatory changes around the pancreas consistent with pancreatitis. 

Cholelithiasis and 1.4cm uterine fibroid as well as possible left salpingitis. 

Admitted for further care


GI, General surgery, ID, Pulm consulted.





3/17: PICC placed per IR. Renal US negative. Started on levophed. Repeat CT 

abdomen w/ necrosis; 3/18: Dialysis catheter per nephrology; 3/19: On BiPAP; 

3/20: BiPAP, dialysis; 3/21: Overnight Tmax 101.7 , still on BiPAP FiO2 40%, 

still on low dose Levophed gtt, TPN initiated. On dialysis


4/6: Tracheostomy; 4/12: S/p tracheostomy on vent spontaneous respirations with 

5 of pressure support 35% FiO2, rectal tube and a Chino, off pressors; 

4/14:Still on vent via trach. Removed PICC and CVC LIJ and replaced. CT 

chest/abd/pelvis with bilateral pleural effusion and ascites.


4/15: Renal function stable. Still on vent. More interactive today. Miming wish 

for food. Plan discussed for thoracentesis/paracentesis with daughters today. 

They were under impression patient was doing worse due to a miscommunication 

which has been clarified over the phone. 4.3L removed.


4/17: Febrile overnight 101.8F. More interactive, still on vent. Asking for ice 

by miming; 4/18: Afebrile overnight. TMax last 24 hours 100.6F. Hb 7.1. 

Interactive when awake. 4/22: Transfusion 1u PRBC (6U total since admit)


4/23-4/26: TPN and precedex, vent.


4/27: Tmax 101F overnight. Hb 8.2. HD cath out since 4/24. Alert. On vent SIMV 

35% FiO2. Surgery: ex-lap, no naif or pancreatic necrosectomy 2/2 profound 

inflammation.


4/28: Seen POD #1. Afebrile overnight. BUN 62. CBC WNL today.Remains on vent via

trach, TPN. Able to point today and indicate she wishes her daughters and Rolf 

to be involved in her care.


4/29: Hb 7.4, Na 151. Remains on vent via trach, TPN. off HD for now. Not 

tolerating trach shield well.


4/30: Negative US for UE DVT. More relaxed today. Has some increase in UOP. 

Tolerating vent well. She is requesting to eat and drink via writing. T max 100F

24 hours ago, but 101F at 1200. Right PICC placed. Speaking valve for half the 

day in whisper


5/1: Na 153 today. Good UOP. Tmax 101F at 1200 on 4/30. She becomes a bit 

anxious and did not sleep much last night, wishes to try to sleep this morning


5/2: Able to speak well with speaking valve today. Na 153, K2.9, Mg 1.8, Cr 1. 

100.4F axillary temp overnight. Asking for food.


5/3: Afebrile overnight. ABG with pH 7.27/75/123. Hb 7.1. Na 153. PCA settings 

decreased


5/4: No acute events reported overnight, case discussed with nursing staff 

patient in no acute distress no complaints during my visit


5/5: Patient responding to verbal stimuli.  She is saturating well and not 

requiring ventilation support, no acute events reported overnight seems to be 

slowly improving


5/6: Patient requiring transfusion of packed red blood cells due to anemia, no 

evidence of acute bleeding, appreciate GI consultant recommendations


5/7: Patient required ventilatory support given that she desaturated, she is 

lying in bed in mild distress, case discussed with nursing staff, no evidence of

bleeding, h an h noted. 


5/8: Underwent IR procedure as follows


BRIEF OPERATIVE NOTE


Pre-Op Diagnosis


Pancreatitis with pseudocysts, suspected infection


Post-Op Diagnosis


same


Procedure Performed


CT abdominal Drains x 3


Surgeon


Hardy


Anesthesia Type:  Conscious Sedation


Findings


3 abdominal drains, 14F, with turbid pancreatic fluid and necrotic debris in 

each.


Complications


No immediate








5/9: Patient today somewhat restless and having bilious secretions from ET tube,

imaging studies ordered, discussed with consultant. Pretty poor prognosis, 

hopefully is not a fistula, poor surgical candidate. 





5/10: Imaging with no acute events, she seems more stable today compared to 

yesterday. Encouraged as much activity as possible patient at high risk for 

severe depression.





Vitals/I&O


Vitals/I&O:





                                   Vital Signs








  Date Time  Temp Pulse Resp B/P (MAP) Pulse Ox O2 Delivery O2 Flow Rate FiO2


 


5/19/20 11:30     100 Ventilator  


 


5/19/20 11:00  141 22 135/67 (89)    


 


5/19/20 09:00 99.4       





 99.4       


 


5/18/20 23:00       3.0 














                                    I & O   


 


 5/18/20 5/18/20 5/19/20





 15:00 23:00 07:00


 


Intake Total 450 ml 1736 ml 1908 ml


 


Output Total 1740 ml 2540 ml 1060 ml


 


Balance -1290 ml -804 ml 848 ml











Physical Exam


Physical Exam:


GENERAL: Severely encephalopathic trying to talk but has a lot of secretions and

is confused


HEENT:  oral cavity dry, NGT


NECK:  Trach with speaking valve


LUNGS: no rhonchi


HEART:  S1, S2, regular 


ABDOMEN: mod Distended, hypoactive BS, tender, + drainsx1


: Chino (4/14)


EXTREMITIES: Generalized edema, improving, no cyanosis, SCDs bilaterally 


SKIN: Warm and dry.  No generalized rash.  


CNS: Very weak 


PICC(4/30) clean


General:  moderate distress


Heart:  Regular rate, Normal S1, Normal S2, No murmurs, Gallops


Lungs:  Wheezing, Other (decrease bs)


Abdomen:  Soft, Other (one drain in place)


Extremities:  No clubbing, No cyanosis, No edema, Normal pulses, No 

tenderness/swelling


Skin:  Other (warm, dry)





Labs


Labs:





Laboratory Tests








Test


 5/18/20


17:03 5/18/20


23:33 5/19/20


00:09 5/19/20


05:10


 


Glucose (Fingerstick)


 179 mg/dL


(70-99) 


 227 mg/dL


(70-99) 





 


O2 Saturation  98 % (92-99)   


 


Arterial Blood pH


 


 7.42


(7.35-7.45) 


 





 


Arterial Blood pCO2 at


Patient Temp 


 78 mmHg


(35-46) 


 





 


Arterial Blood pO2 at Patient


Temp 


 122 mmHg


() 


 





 


Arterial Blood HCO3


 


 49 mmol/L


(21-28) 


 





 


Arterial Blood Base Excess


 


 22 mmol/L


(-3-3) 


 





 


FiO2  32   


 


White Blood Count


 


 


 


 11.3 x10^3/uL


(4.0-11.0)


 


Red Blood Count


 


 


 


 2.58 x10^6/uL


(3.50-5.40)


 


Hemoglobin


 


 


 


 7.4 g/dL


(12.0-15.5)


 


Hematocrit


 


 


 


 22.7 %


(36.0-47.0)


 


Mean Corpuscular Volume    88 fL () 


 


Mean Corpuscular Hemoglobin    29 pg (25-35) 


 


Mean Corpuscular Hemoglobin


Concent 


 


 


 32 g/dL


(31-37)


 


Red Cell Distribution Width


 


 


 


 18.1 %


(11.5-14.5)


 


Platelet Count


 


 


 


 485 x10^3/uL


(140-400)


 


Neutrophils (%) (Auto)    74 % (31-73) 


 


Lymphocytes (%) (Auto)    19 % (24-48) 


 


Monocytes (%) (Auto)    7 % (0-9) 


 


Eosinophils (%) (Auto)    1 % (0-3) 


 


Basophils (%) (Auto)    0 % (0-3) 


 


Neutrophils # (Auto)


 


 


 


 8.3 x10^3/uL


(1.8-7.7)


 


Lymphocytes # (Auto)


 


 


 


 2.1 x10^3/uL


(1.0-4.8)


 


Monocytes # (Auto)


 


 


 


 0.7 x10^3/uL


(0.0-1.1)


 


Eosinophils # (Auto)


 


 


 


 0.1 x10^3/uL


(0.0-0.7)


 


Basophils # (Auto)


 


 


 


 0.0 x10^3/uL


(0.0-0.2)


 


Test


 5/19/20


05:20 5/19/20


08:00 


 





 


Glucose (Fingerstick)


 153 mg/dL


(70-99) 


 


 





 


O2 Saturation  97 % (92-99)   


 


Arterial Blood pH


 


 7.67


(7.35-7.45) 


 





 


Arterial Blood pCO2 at


Patient Temp 


 37 mmHg


(35-46) 


 





 


Arterial Blood pO2 at Patient


Temp 


 80 mmHg


() 


 





 


Arterial Blood HCO3


 


 42 mmol/L


(21-28) 


 





 


Arterial Blood Base Excess


 


 20 mmol/L


(-3-3) 


 





 


FiO2  40   











Review of Systems


Review of Systems:


Unable to obtain





Assessment and Plan


Assessmemt and Plan


Problems


Medical Problems:


(1) Acute pancreatitis


Status: Acute  





(2) Cholelithiasis


Status: Acute  





Acute hypoxic Respiratory failure requiring mechanical ventilation was initially

intubated on 3/23 was off for couple of days now back on


Severe gallstone pancreatitis (not a surgical candidate at this time) with 

necrosis


Tracheostomy


bilateral pleural effusions/pulm edema 


Severe sepsis


Acute kidney failure now requiring dialysis


Salpingitis


Gallstones (Calculus of gallbladder with acute cholecystitis without 

obstruction)


HTN 


Leukocytosis 


Hypoxia


Uterine fibroid


Intractable pain


Intractable nausea


Covid 19 negative. 


Acute on chronic anemia 


EEG: No seizure activityFever  - better currently - intermittent could be from 

underlying pancreatitis blood cults 5/4 - neg so far


? Ileus with vomiting


Abd distention - U/S and CT reviewed s/p 0.4 L of opaque, debris-containing 

ascites was removed 5/6


Acute pancreatitis with persistent necrosis


- 4/27 status post ROBERT drain placement + C paropsilosis. s/p additional drains 

5/8


Anemia - S/p PRBCs


Cholelithiasis with thickening of the gallbladder wall.


Leucocytosis improving


JED, hyperkalemia, Metabolic acidosis off dialysis


Acute hypoxic resp failure ,bilateral pleural effusion and atelectasis


hypocalcemia 


Prediabetes


HTN


s/p trach


ESRD on HD


Hyperglycemia








Plan


ICU monitoring


Currently on the vent with spontaneous respirations 10 of pressure support and 

30% FiO2


Wound care


Hold off on Ativan for now as she gets too weak with it


Humidified O2 via nasal cannula for now but we can use trach shield as backup


NG suctioning


Nebulizers


Continue IV antibiotics and micafungin


Sedation with Precedex


TPN protocol


Continue Chino to bedside drainage


Tracheostomy care


Hope to eventually move towards decannulation (we have a speaking valve for now)


Trend labs


Appreciate subspecialist input


She is extremely critically ill


Prognosis is extremely guarded at best still I am concerned she may not survive 

this (she scored a 9 on Vandana criteria 2 weeks ago). 


Total time 32-minute





Comment


Review of Relevant


I have reviewed the following items josy (where applicable) has been applied.


Medications:





Current Medications








 Medications


  (Trade)  Dose


 Ordered  Sig/Yee


 Route


 PRN Reason  Start Time


 Stop Time Status Last Admin


Dose Admin


 


 Fentanyl Citrate


  (Fentanyl 2ml


 Vial)  50 mcg  PRN Q4HRS  PRN


 IVP


 SEVERE PAIN  5/18/20 13:15


    5/19/20 10:12





 


 Fentanyl Citrate


  (Fentanyl 2ml


 Vial)  25 mcg  PRN Q4HRS  PRN


 IVP


 MODERATE PAIN  5/18/20 13:15


    5/18/20 17:13





 


 Potassium


 Chloride 90 meq/


 Magnesium Sulfate


 20 meq/


 Multivitamins 10


 ml/Chromium/


 Copper/Manganese/


 Seleni/Zn 1 ml/


 Insulin Human


 Regular 15 unit/


 Total Parenteral


 Nutrition/Amino


 Acids/Dextrose/


 Fat Emulsion


 Intravenous  1,890 ml @ 


 78.75 mls/


 hr  TPN  CONT


 IV


   5/18/20 22:00


 5/19/20 21:59  5/18/20 22:18





 


 Furosemide


  (Lasix)  40 mg  1X  ONCE


 IVP


   5/18/20 21:45


 5/18/20 21:48 DC 5/18/20 21:51














Hemodynamically unstable?:  No


Is patient in severe pain?:  No


Is NPO status required?:  Yes











CASTLE,NIAL K III DO           May 19, 2020 12:35

## 2020-05-19 NOTE — RAD
EXAM: KUB 5/19/2020 1:32 PM

 

CLINICAL INDICATION:OG tube placement

 

COMPARISON:[facility] 5/17/2020

 

TECHNIQUE:AP supine view the abdomen

 

FINDINGS:A nasogastric tube terminates in the gastric body. The sidehole 

is intragastric. 2 pigtail catheters project over the right lower 

quadrant. Bowel gas pattern is nonobstructive. No acute osseous 

abnormality. Probable small left pleural effusion is redemonstrated.

 

IMPRESSION:Intragastric position of nasogastric tube.

 

Electronically signed by: Annette Bone MD (5/19/2020 2:36 PM) [id number]

## 2020-05-20 VITALS — DIASTOLIC BLOOD PRESSURE: 64 MMHG | SYSTOLIC BLOOD PRESSURE: 110 MMHG

## 2020-05-20 VITALS — DIASTOLIC BLOOD PRESSURE: 54 MMHG | SYSTOLIC BLOOD PRESSURE: 90 MMHG

## 2020-05-20 VITALS — SYSTOLIC BLOOD PRESSURE: 97 MMHG | DIASTOLIC BLOOD PRESSURE: 56 MMHG

## 2020-05-20 VITALS — SYSTOLIC BLOOD PRESSURE: 109 MMHG | DIASTOLIC BLOOD PRESSURE: 66 MMHG

## 2020-05-20 VITALS — SYSTOLIC BLOOD PRESSURE: 128 MMHG | DIASTOLIC BLOOD PRESSURE: 72 MMHG

## 2020-05-20 VITALS — DIASTOLIC BLOOD PRESSURE: 57 MMHG | SYSTOLIC BLOOD PRESSURE: 109 MMHG

## 2020-05-20 VITALS — DIASTOLIC BLOOD PRESSURE: 59 MMHG | SYSTOLIC BLOOD PRESSURE: 110 MMHG

## 2020-05-20 VITALS — SYSTOLIC BLOOD PRESSURE: 91 MMHG | DIASTOLIC BLOOD PRESSURE: 45 MMHG

## 2020-05-20 VITALS — SYSTOLIC BLOOD PRESSURE: 127 MMHG | DIASTOLIC BLOOD PRESSURE: 60 MMHG

## 2020-05-20 VITALS — SYSTOLIC BLOOD PRESSURE: 144 MMHG | DIASTOLIC BLOOD PRESSURE: 87 MMHG

## 2020-05-20 VITALS — DIASTOLIC BLOOD PRESSURE: 79 MMHG | SYSTOLIC BLOOD PRESSURE: 108 MMHG

## 2020-05-20 VITALS — SYSTOLIC BLOOD PRESSURE: 103 MMHG | DIASTOLIC BLOOD PRESSURE: 62 MMHG

## 2020-05-20 VITALS — DIASTOLIC BLOOD PRESSURE: 102 MMHG | SYSTOLIC BLOOD PRESSURE: 189 MMHG

## 2020-05-20 VITALS — DIASTOLIC BLOOD PRESSURE: 73 MMHG | SYSTOLIC BLOOD PRESSURE: 151 MMHG

## 2020-05-20 VITALS — SYSTOLIC BLOOD PRESSURE: 132 MMHG | DIASTOLIC BLOOD PRESSURE: 75 MMHG

## 2020-05-20 VITALS — SYSTOLIC BLOOD PRESSURE: 176 MMHG | DIASTOLIC BLOOD PRESSURE: 84 MMHG

## 2020-05-20 VITALS — SYSTOLIC BLOOD PRESSURE: 100 MMHG | DIASTOLIC BLOOD PRESSURE: 58 MMHG

## 2020-05-20 VITALS — SYSTOLIC BLOOD PRESSURE: 122 MMHG | DIASTOLIC BLOOD PRESSURE: 70 MMHG

## 2020-05-20 VITALS — SYSTOLIC BLOOD PRESSURE: 96 MMHG | DIASTOLIC BLOOD PRESSURE: 58 MMHG

## 2020-05-20 VITALS — SYSTOLIC BLOOD PRESSURE: 140 MMHG | DIASTOLIC BLOOD PRESSURE: 76 MMHG

## 2020-05-20 VITALS — DIASTOLIC BLOOD PRESSURE: 58 MMHG | SYSTOLIC BLOOD PRESSURE: 101 MMHG

## 2020-05-20 VITALS — SYSTOLIC BLOOD PRESSURE: 104 MMHG | DIASTOLIC BLOOD PRESSURE: 58 MMHG

## 2020-05-20 VITALS — DIASTOLIC BLOOD PRESSURE: 58 MMHG | SYSTOLIC BLOOD PRESSURE: 100 MMHG

## 2020-05-20 VITALS — SYSTOLIC BLOOD PRESSURE: 111 MMHG | DIASTOLIC BLOOD PRESSURE: 63 MMHG

## 2020-05-20 LAB
ANION GAP SERPL CALC-SCNC: 0 MMOL/L (ref 6–14)
BASE EXCESS ABG: 16 MMOL/L (ref -3–3)
BUN SERPL-MCNC: 30 MG/DL (ref 7–20)
CALCIUM SERPL-MCNC: 8.8 MG/DL (ref 8.5–10.1)
CHLORIDE SERPL-SCNC: 99 MMOL/L (ref 98–107)
CO2 SERPL-SCNC: 41 MMOL/L (ref 21–32)
CREAT SERPL-MCNC: 0.7 MG/DL (ref 0.6–1)
GFR SERPLBLD BASED ON 1.73 SQ M-ARVRAT: 88.9 ML/MIN
GLUCOSE SERPL-MCNC: 147 MG/DL (ref 70–99)
HCO3 BLDA-SCNC: 41 MMOL/L (ref 21–28)
INSPIRATION SETTING TIME VENT: (no result)
PCO2 BLDA: 54 MMHG (ref 35–46)
PO2 BLDA: 106 MMHG (ref 75–108)
POTASSIUM SERPL-SCNC: 3.9 MMOL/L (ref 3.5–5.1)
SAO2 % BLDA: 97 % (ref 92–99)
SODIUM SERPL-SCNC: 140 MMOL/L (ref 136–145)

## 2020-05-20 RX ADMIN — FENTANYL CITRATE PRN MCG: 50 INJECTION INTRAMUSCULAR; INTRAVENOUS at 05:00

## 2020-05-20 RX ADMIN — DEXTROSE SCH MLS/HR: 50 INJECTION, SOLUTION INTRAVENOUS at 12:40

## 2020-05-20 RX ADMIN — ENOXAPARIN SODIUM SCH MG: 40 INJECTION SUBCUTANEOUS at 16:19

## 2020-05-20 RX ADMIN — FENTANYL CITRATE PRN MCG: 50 INJECTION INTRAMUSCULAR; INTRAVENOUS at 21:30

## 2020-05-20 RX ADMIN — DAPTOMYCIN SCH MLS/HR: 500 INJECTION, POWDER, LYOPHILIZED, FOR SOLUTION INTRAVENOUS at 09:25

## 2020-05-20 RX ADMIN — FUROSEMIDE SCH MG: 10 INJECTION, SOLUTION INTRAMUSCULAR; INTRAVENOUS at 09:34

## 2020-05-20 RX ADMIN — MEROPENEM SCH MLS/HR: 1 INJECTION, POWDER, FOR SOLUTION INTRAVENOUS at 16:18

## 2020-05-20 RX ADMIN — DEXMEDETOMIDINE HYDROCHLORIDE PRN MLS/HR: 100 INJECTION, SOLUTION, CONCENTRATE INTRAVENOUS at 05:00

## 2020-05-20 RX ADMIN — DEXMEDETOMIDINE HYDROCHLORIDE PRN MLS/HR: 100 INJECTION, SOLUTION, CONCENTRATE INTRAVENOUS at 17:50

## 2020-05-20 RX ADMIN — Medication PRN EACH: at 10:01

## 2020-05-20 RX ADMIN — DEXMEDETOMIDINE HYDROCHLORIDE PRN MLS/HR: 100 INJECTION, SOLUTION, CONCENTRATE INTRAVENOUS at 22:27

## 2020-05-20 RX ADMIN — INSULIN LISPRO SCH UNITS: 100 INJECTION, SOLUTION INTRAVENOUS; SUBCUTANEOUS at 16:30

## 2020-05-20 RX ADMIN — BACITRACIN SCH MLS/HR: 5000 INJECTION, POWDER, FOR SOLUTION INTRAMUSCULAR at 13:37

## 2020-05-20 RX ADMIN — FUROSEMIDE SCH MG: 10 INJECTION, SOLUTION INTRAMUSCULAR; INTRAVENOUS at 16:19

## 2020-05-20 RX ADMIN — INSULIN LISPRO SCH UNITS: 100 INJECTION, SOLUTION INTRAVENOUS; SUBCUTANEOUS at 12:33

## 2020-05-20 RX ADMIN — DEXMEDETOMIDINE HYDROCHLORIDE PRN MLS/HR: 100 INJECTION, SOLUTION, CONCENTRATE INTRAVENOUS at 12:28

## 2020-05-20 RX ADMIN — INSULIN LISPRO SCH UNITS: 100 INJECTION, SOLUTION INTRAVENOUS; SUBCUTANEOUS at 06:00

## 2020-05-20 RX ADMIN — FENTANYL CITRATE PRN MCG: 50 INJECTION INTRAMUSCULAR; INTRAVENOUS at 16:18

## 2020-05-20 RX ADMIN — FENTANYL CITRATE PRN MCG: 50 INJECTION INTRAMUSCULAR; INTRAVENOUS at 05:30

## 2020-05-20 RX ADMIN — MEROPENEM SCH MLS/HR: 1 INJECTION, POWDER, FOR SOLUTION INTRAVENOUS at 22:25

## 2020-05-20 RX ADMIN — PANTOPRAZOLE SODIUM SCH MG: 40 INJECTION, POWDER, FOR SOLUTION INTRAVENOUS at 09:34

## 2020-05-20 RX ADMIN — MEROPENEM SCH MLS/HR: 1 INJECTION, POWDER, FOR SOLUTION INTRAVENOUS at 06:45

## 2020-05-20 NOTE — PDOC
Infectious Disease Note


Subjective:


Subjective


Patient comfortable, arousable,says feels okay


 off vent, trach o2





Vital Signs:


Vital Signs





Vital Signs








  Date Time  Temp Pulse Resp B/P (MAP) Pulse Ox O2 Delivery O2 Flow Rate FiO2


 


5/20/20 07:00  72 14 100/58 (72) 100 Ventilator  


 


5/20/20 04:00 98.2       





 98.2       











Physical Exam:


PHYSICAL EXAM


GENERAL: Severely encephalopathic trying to talk but has a lot of secretions and

is confused


HEENT:  oral cavity dry, NGT


NECK:  Trach with speaking valve


LUNGS: no rhonchi


HEART:  S1, S2, regular 


ABDOMEN: mod Distended, hypoactive BS, tender, + drainsx1


: [place] (4/14)


EXTREMITIES: Generalized edema, improving, no cyanosis, SCDs bilaterally 


SKIN: Warm and dry.  No generalized rash.  


CNS: Very weak 


PICC(4/30) clean





Medications:


Inpatient Meds:





Current Medications








 Medications


  (Trade)  Dose


 Ordered  Sig/Yee  Start Time


 Stop Time Status Last Admin


Dose Admin


 


 Acetaminophen


  (Tylenol Supp)  650 mg  PRN Q6HRS  PRN  3/24/20 10:30


    5/5/20 09:12


650 MG


 


 Acetaminophen


  (Tylenol)  650 mg  PRN Q6HRS  PRN  3/21/20 03:36


 5/13/20 10:25 DC 4/16/20 19:56


650 MG


 


 Albumin Human  100 ml @ 


 100 mls/hr  1X PRN  PRN  5/19/20 01:30


     





 


 Albuterol Sulfate


  (Ventolin Neb


 Soln)  2.5 mg  1X  ONCE  3/17/20 22:30


 3/17/20 22:31 DC 3/18/20 00:56


2.5 MG


 


 Alteplase,


 Recombinant


  (Cathflo For


 Central Catheter


 Clearance)  1 mg  1X  ONCE  4/24/20 10:45


 4/24/20 10:46 DC 4/24/20 11:44


1 MG


 


 Amino Acids/


 Glycerin/


 Electrolytes  1,000 ml @ 


 75 mls/hr  [id number]  4/20/20 21:15


   UNV  





 


 Artificial Tears


  (Artificial


 Tears)  1 drop  PRN Q15MIN  PRN  4/29/20 05:30


    5/18/20 08:08


1 DROP


 


 Atenolol


  (Tenormin)  100 mg  DAILY  3/17/20 09:00


 3/16/20 20:08 DC  





 


 Atropine Sulfate


  (ATROPINE 0.5mg


 SYRINGE)  0.5 mg  PRN Q5MIN  PRN  4/2/20 08:15


     





 


 Benzocaine


  (Hurricaine One)  1 spray  1X  ONCE  3/20/20 14:30


 3/20/20 14:31 DC 3/20/20 16:38


1 SPRAY


 


 Bisacodyl


  (Dulcolax Supp)  10 mg  STK-MED ONCE  4/27/20 10:59


 4/27/20 10:59 DC  





 


 Bumetanide


  (Bumex)  2 mg  DAILY  5/8/20 10:00


 5/18/20 17:15 DC 5/18/20 08:07


2 MG


 


 Bupivacaine HCl/


 Epinephrine Bitart


  (Sensorcain-Epi


 0.5%-1:522935 Mpf)  30 ml  STK-MED ONCE  4/27/20 10:58


 4/27/20 10:58 DC 4/27/20 12:01


7 ML


 


 Calcium Carbonate/


 Glycine


  (Tums)  500 mg  PRN [id number]  PRN  3/18/20 17:45


 5/13/20 10:25 DC  





 


 Calcium Chloride


 1000 mg/Sodium


 Chloride  110 ml @ 


 220 mls/hr  1X  ONCE  3/17/20 22:30


 3/17/20 22:59 DC 3/17/20 22:11


220 MLS/HR


 


 Calcium Chloride


 3000 mg/Sodium


 Chloride  1,030 ml @ 


 50 mls/hr  [id number]  3/19/20 08:00


 3/21/20 15:23 DC 3/21/20 02:17


50 MLS/HR


 


 Calcium Gluconate


  (Calcium


 Gluconate)  2,000 mg  1X  ONCE  3/19/20 02:15


 3/19/20 02:16 DC 3/19/20 02:19


2,000 MG


 


 Calcium Gluconate


 1000 mg/Sodium


 Chloride  110 ml @ 


 220 mls/hr  1X  ONCE  3/18/20 03:30


 3/18/20 03:59 DC 3/18/20 03:21


220 MLS/HR


 


 Calcium Gluconate


 2000 mg/Sodium


 Chloride  120 ml @ 


 220 mls/hr  1X  ONCE  3/18/20 07:30


 3/18/20 08:02 DC 3/18/20 09:05


220 MLS/HR


 


 Cefepime HCl


  (Maxipime)  2 gm  Q12HR  3/25/20 09:00


 4/8/20 09:58 DC 4/7/20 20:56


2 GM


 


 Cellulose


  (Surgicel


 Fibrillar 1x2)  1 each  STK-MED ONCE  4/6/20 11:00


 4/6/20 11:01 DC  





 


 Cellulose


  (Surgicel


 Hemostat 2x14)  1 each  STK-MED ONCE  4/27/20 10:58


 4/27/20 10:59 DC  





 


 Cellulose


  (Surgicel


 Hemostat 4x8)  1 each  STK-MED ONCE  4/27/20 10:58


 4/27/20 10:59 DC  





 


 Cyclobenzaprine


 HCl


  (Flexeril)  10 mg  PRN Q6HRS  PRN  4/30/20 10:45


     





 


 Daptomycin 430 mg/


 Sodium Chloride  50 ml @ 


 100 mls/hr  Q24H  4/25/20 13:00


 4/30/20 20:58 DC 4/30/20 13:00


100 MLS/HR


 


 Daptomycin 450 mg/


 Sodium Chloride  50 ml @ 


 100 mls/hr  Q24H  5/17/20 09:00


    5/19/20 09:52


100 MLS/HR


 


 Daptomycin 485 mg/


 Sodium Chloride  50 ml @ 


 100 mls/hr  Q24H  5/4/20 11:00


 5/12/20 07:44 DC 5/11/20 13:10


100 MLS/HR


 


 Daptomycin 500 mg/


 Sodium Chloride  50 ml @ 


 100 mls/hr  Q48H  3/25/20 08:30


 4/10/20 10:07 DC 4/10/20 09:57


100 MLS/HR


 


 Dexamethasone


 Sodium Phosphate


  (Decadron)  4 mg  STK-MED ONCE  4/27/20 10:56


 4/27/20 10:57 DC  





 


 Dexmedetomidine


 HCl 400 mcg/


 Sodium Chloride  100 ml @ 0


 mls/hr  CONT  PRN  4/2/20 08:15


    5/20/20 05:00


16.1 MLS/HR


 


 Dextrose


  (Dextrose


 50%-Water Syringe)  12.5 gm  PRN Q15MIN  PRN  3/16/20 09:30


     





 


 Digoxin


  (Lanoxin)  125 mcg  1X  ONCE  3/19/20 18:00


 3/19/20 18:01 DC 3/19/20 17:10


125 MCG


 


 Diphenhydramine


 HCl


  (Benadryl)  25 mg  1X PRN  PRN  4/24/20 15:45


 4/25/20 15:44 DC  





 


 Duloxetine HCl


  (Cymbalta)  30 mg  DAILY  5/10/20 14:00


 5/13/20 10:25 DC 5/11/20 09:48


30 MG


 


 Enoxaparin Sodium


  (Lovenox 100mg


 Syringe)  100 mg  Q12HR  4/21/20 21:00


   UNV  





 


 Enoxaparin Sodium


  (Lovenox 40mg


 Syringe)  40 mg  Q24H  5/17/20 17:00


    5/19/20 17:43


40 MG


 


 Etomidate


  (Amidate)  8 mg  1X  ONCE  3/23/20 08:30


 3/23/20 08:31 DC 3/23/20 08:33


8 MG


 


 Fentanyl Citrate


  (Fentanyl 2ml


 Vial)  25 mcg  PRN Q4HRS  PRN  5/18/20 13:15


    5/18/20 17:13


25 MCG


 


 Fentanyl Citrate


  (Fentanyl 5ml


 Vial)  250 mcg  1X  ONCE  5/8/20 09:15


 5/8/20 09:16 DC 5/8/20 09:30


50 MCG


 


 Furosemide


  (Lasix)  40 mg  [id number]  5/19/20 14:00


    5/19/20 14:24


40 MG


 


 Haloperidol


 Lactate


  (Haldol Inj)  3 mg  1X  ONCE  5/4/20 14:30


 5/4/20 14:31 DC 5/4/20 14:37


3 MG


 


 Heparin Sodium


  (Porcine)


  (Hep Lock Adult)  500 unit  STK-MED ONCE  4/7/20 09:29


 4/7/20 09:30 DC  





 


 Heparin Sodium


  (Porcine)


  (Heparin Sodium)  5,000 unit  Q12HR  4/27/20 21:00


 5/7/20 09:59 DC 5/6/20 20:57


5,000 UNIT


 


 Heparin Sodium


  (Porcine) 1000


 unit/Sodium


 Chloride  1,001 ml @ 


 1,001 mls/hr  1X  ONCE  4/27/20 12:00


 4/27/20 12:59 DC  





 


 Hydromorphone HCl


  (Dilaudid


 Standard PCA)  12 mg  STK-MED ONCE  5/1/20 15:50


 5/12/20 11:24 DC  





 


 Hydromorphone HCl


  (Dilaudid)  1 mg  PRN Q4HRS  PRN  5/4/20 19:00


 5/18/20 17:10 DC 5/18/20 06:25


1 MG


 


 Info


  (CONTRAST GIVEN


 -- Rx MONITORING)  1 each  PRN DAILY  PRN  3/30/20 11:45


 4/1/20 11:44 DC  





 


 Info


  (Icu Electrolyte


 Protocol)  1 ea  CONT PRN  PRN  3/29/20 13:15


     





 


 Info


  (PHARMACY


 MONITORING -- do


 not chart)  1 each  PRN DAILY  PRN  4/24/20 15:45


     





 


 Info


  (Tpn Per


 Pharmacy)  1 each  PRN DAILY  PRN  3/18/20 12:30


   UNV  





 


 Insulin Human


 Lispro


  (HumaLOG)  0-9 UNITS  Q6HRS  3/16/20 09:30


    5/19/20 23:44


5 UNITS


 


 Insulin Human


 Regular


  (HumuLIN R VIAL)  5 unit  1X  ONCE  3/17/20 22:30


 3/17/20 22:31 DC 3/17/20 22:14


5 UNIT


 


 Iohexol


  (Omnipaque 240


 Mg/ml)  30 ml  1X  ONCE  3/30/20 11:30


 3/30/20 11:33 DC 3/30/20 11:30


30 ML


 


 Iohexol


  (Omnipaque 300


 Mg/ml)  50 ml  STK-MED ONCE  4/27/20 10:58


 4/27/20 10:59 DC  





 


 Iohexol


  (Omnipaque 350


 Mg/ml)  90 ml  1X  ONCE  3/16/20 03:30


 3/16/20 03:31 DC 3/16/20 03:25


90 ML


 


 Ketorolac


 Tromethamine


  (Toradol 30mg


 Vial)  30 mg  1X  ONCE  3/16/20 03:00


 3/16/20 03:01 DC 3/16/20 02:54


30 MG


 


 Lidocaine HCl


  (Buffered


 Lidocaine 1%)  6 ml  1X  ONCE  5/12/20 14:15


 5/12/20 14:16 DC 5/12/20 14:15


3 ML


 


 Lidocaine HCl


  (Glydo


  (Lidocaine) Jelly)  1 thomas  1X  ONCE  3/20/20 14:30


 3/20/20 14:31 DC 3/20/20 16:38


1 THOMAS


 


 Lidocaine HCl


  (Xylocaine-Mpf


 1% 2ml Vial)  2 ml  PRN 1X  PRN  4/27/20 07:00


 4/28/20 06:59 DC  





 


 Linezolid/Dextrose  300 ml @ 


 300 mls/hr  Q12HR  5/17/20 09:00


    5/19/20 21:08


300 MLS/HR


 


 Lorazepam


  (Ativan Inj)  2 mg  PRN Q4HRS  PRN  5/17/20 19:15


    5/18/20 22:20


2 MG


 


 Magnesium Sulfate  50 ml @ 25


 mls/hr  1X  ONCE  5/10/20 09:00


 5/10/20 10:59 DC 5/10/20 10:44


25 MLS/HR


 


 Meropenem 1 gm/


 Sodium Chloride  100 ml @ 


 200 mls/hr  Q8HRS  4/28/20 14:00


    5/20/20 06:45


200 MLS/HR


 


 Meropenem 500 mg/


 Sodium Chloride  50 ml @ 


 100 mls/hr  Q12H  4/8/20 10:00


 4/28/20 12:37 DC 4/28/20 10:45


100 MLS/HR


 


 Metoclopramide HCl


  (Reglan Vial)  10 mg  PRN Q3HRS  PRN  5/9/20 16:45


    5/14/20 04:25


10 MG


 


 Metoprolol


 Tartrate


  (Lopressor Vial)  5 mg  1X  ONCE  5/17/20 15:15


 5/17/20 15:16 DC 5/17/20 15:31


5 MG


 


 Metronidazole  100 ml @ 


 100 mls/hr  Q8HRS  4/14/20 10:00


 4/21/20 08:10 DC 4/21/20 06:04


100 MLS/HR


 


 Micafungin Sodium


 100 mg/Dextrose  100 ml @ 


 100 mls/hr  Q24H  5/4/20 11:00


    5/19/20 11:21


100 MLS/HR


 


 Midazolam HCl


  (Versed)  5 mg  1X  ONCE  5/8/20 09:15


 5/8/20 09:16 DC 5/8/20 09:30


1 MG


 


 Midazolam HCl 100


 mg/Sodium Chloride  100 ml @ 7


 mls/hr  CONT  PRN  3/28/20 16:00


    4/8/20 15:35


7 MLS/HR


 


 Midazolam HCl 50


 mg/Sodium Chloride  50 ml @ 0


 mls/hr  CONT  PRN  3/23/20 08:15


 3/28/20 15:59 DC 3/26/20 22:39


7 MLS/HR


 


 Morphine Sulfate


  (Morphine


 Sulfate)  2 mg  PRN Q2HR  PRN  3/16/20 05:00


 3/17/20 14:15 DC 3/17/20 12:26


2 MG


 


 Multi-Ingred


 Cream/Lotion/Oil/


 Oint


  (Artificial


 Tears Eye


 Ointment)  1 thomas  PRN Q1HR  PRN  3/25/20 17:30


    4/13/20 08:19


1 THOMAS


 


 Naloxone HCl


  (Narcan)  0.4 mg  PRN Q2MIN  PRN  4/27/20 14:30


   UNV  





 


 Norepinephrine


 Bitartrate 8 mg/


 Dextrose  258 ml @ 


 17.299 mls/


 hr  CONT  PRN  3/17/20 15:30


 4/17/20 09:19 DC 4/14/20 12:48


20.9 MLS/HR


 


 Ondansetron HCl


  (Zofran)  4 mg  STK-MED ONCE  4/27/20 10:56


 4/27/20 10:57 DC  





 


 Pantoprazole


 Sodium


  (PROTONIX VIAL


 for IV PUSH)  40 mg  DAILYAC  3/16/20 11:30


    5/19/20 09:51


40 MG


 


 Phenylephrine HCl


  (PHENYLEPHRINE


 in 0.9% NACL PF)  1 mg  STK-MED ONCE  4/27/20 12:34


 4/27/20 12:34 DC  





 


 Piperacillin Sod/


 Tazobactam Sod


 4.5 gm/Sodium


 Chloride  100 ml @ 


 200 mls/hr  1X  ONCE  3/16/20 06:00


 3/16/20 06:29 DC 3/16/20 05:44


200 MLS/HR


 


 Potassium


 Chloride 15 meq/


 Bicarbonate


 Dialysis Soln w/


 out KCl  5,007.5 ml


  @ 1,000 mls/


 hr  Q5H1M  3/29/20 20:00


 4/2/20 13:08 DC 4/1/20 18:14


1,000 MLS/HR


 


 Potassium


 Chloride 20 meq/


 Bicarbonate


 Dialysis Soln w/


 out KCl  5,010 ml @ 


 1,000 mls/hr  Q5H1M  3/25/20 16:00


 3/29/20 19:59 DC 3/29/20 14:54


1,000 MLS/HR


 


 Potassium


 Chloride 75 meq/


 Magnesium Sulfate


 15 meq/


 Multivitamins 10


 ml/Chromium/


 Copper/Manganese/


 Seleni/Zn 0.5 ml/


 Insulin Human


 Regular 15 unit/


 Total Parenteral


 Nutrition/Amino


 Acids/Dextrose/


 Fat Emulsion


 Intravenous  1,920 ml @ 


 80 mls/hr  TPN  CONT  5/9/20 22:00


 5/10/20 21:59 DC 5/9/20 22:41


80 MLS/HR


 


 Potassium


 Chloride 75 meq/


 Magnesium Sulfate


 15 meq/Calcium


 Gluconate 8 meq/


 Multivitamins 10


 ml/Chromium/


 Copper/Manganese/


 Seleni/Zn 0.5 ml/


 Insulin Human


 Regular 15 unit/


 Total Parenteral


 Nutrition/Amino


 Acids/Dextrose/


 Fat Emulsion


 Intravenous  1,920 ml @ 


 80 mls/hr  TPN  CONT  5/7/20 22:00


 5/8/20 21:59 DC 5/7/20 22:28


80 MLS/HR


 


 Potassium


 Chloride 75 meq/


 Magnesium Sulfate


 15 meq/Calcium


 Gluconate 8 meq/


 Multivitamins 10


 ml/Chromium/


 Copper/Manganese/


 Seleni/Zn 0.5 ml/


 Insulin Human


 Regular 20 unit/


 Total Parenteral


 Nutrition/Amino


 Acids/Dextrose/


 Fat Emulsion


 Intravenous  1,920 ml @ 


 80 mls/hr  TPN  CONT  5/6/20 22:00


 5/7/20 21:59 DC 5/6/20 22:00


80 MLS/HR


 


 Potassium


 Chloride 75 meq/


 Magnesium Sulfate


 15 meq/Calcium


 Gluconate 8 meq/


 Multivitamins 10


 ml/Chromium/


 Copper/Manganese/


 Seleni/Zn 0.5 ml/


 Insulin Human


 Regular 25 unit/


 Total Parenteral


 Nutrition/Amino


 Acids/Dextrose/


 Fat Emulsion


 Intravenous  1,920 ml @ 


 80 mls/hr  TPN  CONT  5/4/20 22:00


 5/5/20 21:59 DC 5/4/20 23:08


80 MLS/HR


 


 Potassium


 Chloride 75 meq/


 Magnesium Sulfate


 20 meq/Calcium


 Gluconate 10 meq/


 Multivitamins 10


 ml/Chromium/


 Copper/Manganese/


 Seleni/Zn 0.5 ml/


 Insulin Human


 Regular 25 unit/


 Total Parenteral


 Nutrition/Amino


 Acids/Dextrose/


 Fat Emulsion


 Intravenous  1,920 ml @ 


 80 mls/hr  TPN  CONT  5/3/20 22:00


 5/4/20 21:59 DC 5/3/20 22:04


80 MLS/HR


 


 Potassium


 Chloride 75 meq/


 Magnesium Sulfate


 20 meq/Calcium


 Gluconate 10 meq/


 Multivitamins 10


 ml/Chromium/


 Copper/Manganese/


 Seleni/Zn 0.5 ml/


 Insulin Human


 Regular 30 unit/


 Total Parenteral


 Nutrition/Amino


 Acids/Dextrose/


 Fat Emulsion


 Intravenous  1,920 ml @ 


 80 mls/hr  TPN  CONT  5/2/20 22:00


 5/3/20 22:00 DC 5/2/20 21:51


80 MLS/HR


 


 Potassium


 Chloride 80 meq/


 Magnesium Sulfate


 20 meq/


 Multivitamins 10


 ml/Chromium/


 Copper/Manganese/


 Seleni/Zn 0.5 ml/


 Insulin Human


 Regular 15 unit/


 Total Parenteral


 Nutrition/Amino


 Acids/Dextrose/


 Fat Emulsion


 Intravenous  1,920 ml @ 


 80 mls/hr  TPN  CONT  5/12/20 22:00


 5/13/20 21:59 DC 5/12/20 21:40


80 MLS/HR


 


 Potassium


 Chloride 80 meq/


 Magnesium Sulfate


 20 meq/


 Multivitamins 10


 ml/Chromium/


 Copper/Manganese/


 Seleni/Zn 1 ml/


 Insulin Human


 Regular 15 unit/


 Total Parenteral


 Nutrition/Amino


 Acids/Dextrose/


 Fat Emulsion


 Intravenous  1,920 ml @ 


 80 mls/hr  TPN  CONT  5/13/20 22:00


 5/14/20 21:59 DC 5/13/20 22:04


80 MLS/HR


 


 Potassium


 Chloride 90 meq/


 Magnesium Sulfate


 20 meq/


 Multivitamins 10


 ml/Chromium/


 Copper/Manganese/


 Seleni/Zn 1 ml/


 Insulin Human


 Regular 15 unit/


 Total Parenteral


 Nutrition/Amino


 Acids/Dextrose/


 Fat Emulsion


 Intravenous  1,800 ml @ 


 75 mls/hr  TPN  CONT  5/19/20 22:00


 5/20/20 21:59  5/19/20 22:31


75 MLS/HR


 


 Potassium


 Chloride/Water  100 ml @ 


 100 mls/hr  1X  ONCE  5/14/20 11:00


 5/14/20 11:59 DC 5/14/20 11:34


100 MLS/HR


 


 Potassium


 Phosphate 20 mmol/


 Sodium Chloride  106.6667


 ml @ 


 51.667 m...  1X  ONCE  3/25/20 13:00


 3/25/20 15:03 DC 3/25/20 12:51


51.667 MLS/HR


 


 Potassium Acetate


 30 meq/Magnesium


 Sulfate 20 meq/


 Calcium Gluconate


 10 meq/


 Multivitamins 10


 ml/Chromium/


 Copper/Manganese/


 Seleni/Zn 0.5 ml/


 Insulin Human


 Regular 30 unit/


 Potassium


 Chloride 30 meq/


 Total Parenteral


 Nutrition/Amino


 Acids/Dextrose/


 Fat Emulsion


 Intravenous  1,920 ml @ 


 80 mls/hr  TPN  CONT  5/1/20 22:00


 5/2/20 21:59 DC 5/1/20 22:34


80 MLS/HR


 


 Potassium Acetate


 55 meq/Magnesium


 Sulfate 20 meq/


 Calcium Gluconate


 10 meq/


 Multivitamins 10


 ml/Chromium/


 Copper/Manganese/


 Seleni/Zn 0.5 ml/


 Insulin Human


 Regular 30 unit/


 Total Parenteral


 Nutrition/Amino


 Acids/Dextrose/


 Fat Emulsion


 Intravenous  1,920 ml @ 


 80 mls/hr  TPN  CONT  4/30/20 22:00


 5/1/20 21:59 DC 5/1/20 01:00


80 MLS/HR


 


 Potassium Acetate


 55 meq/Magnesium


 Sulfate 20 meq/


 Calcium Gluconate


 10 meq/


 Multivitamins 10


 ml/Chromium/


 Copper/Manganese/


 Seleni/Zn 0.5 ml/


 Insulin Human


 Regular 35 unit/


 Total Parenteral


 Nutrition/Amino


 Acids/Dextrose/


 Fat Emulsion


 Intravenous  1,920 ml @ 


 80 mls/hr  TPN  CONT  4/28/20 22:00


 4/29/20 21:59 DC 4/28/20 22:02


80 MLS/HR


 


 Potassium Acetate


 65 meq/Magnesium


 Sulfate 20 meq/


 Calcium Gluconate


 10 meq/


 Multivitamins 10


 ml/Chromium/


 Copper/Manganese/


 Seleni/Zn 0.5 ml/


 Insulin Human


 Regular 30 unit/


 Total Parenteral


 Nutrition/Amino


 Acids/Dextrose/


 Fat Emulsion


 Intravenous  1,920 ml @ 


 80 mls/hr  TPN  CONT  4/29/20 22:00


 4/30/20 21:59 DC 4/29/20 22:22


80 MLS/HR


 


 Prochlorperazine


 Edisylate


  (Compazine)  5 mg  PACU PRN  PRN  4/27/20 07:00


 4/28/20 06:59 DC  





 


 Propofol  20 ml @ As


 Directed  STK-MED ONCE  4/27/20 12:26


 4/27/20 12:27 DC  





 


 Ringer's Solution  1,000 ml @ 


 30 mls/hr  Q24H  4/27/20 07:00


 4/27/20 18:59 DC  





 


 Rocuronium Bromide


  (Zemuron)  50 mg  STK-MED ONCE  4/27/20 10:56


 4/27/20 10:57 DC  





 


 Sevoflurane


  (Ultane)  60 ml  STK-MED ONCE  4/27/20 12:26


 4/27/20 12:27 DC  





 


 Sodium


 Bicarbonate 50


 meq/Sodium


 Chloride  1,050 ml @ 


 75 mls/hr  Q14H  3/18/20 07:30


 3/23/20 10:28 DC 3/22/20 21:10


75 MLS/HR


 


 Sodium Acetate 50


 meq/Potassium


 Acetate 55 meq/


 Magnesium Sulfate


 20 meq/Calcium


 Gluconate 10 meq/


 Multivitamins 10


 ml/Chromium/


 Copper/Manganese/


 Seleni/Zn 0.5 ml/


 Insulin Human


 Regular 35 unit/


 Total Parenteral


 Nutrition/Amino


 Acids/Dextrose/


 Fat Emulsion


 Intravenous  1,800 ml @ 


 75 mls/hr  TPN  CONT  4/25/20 22:00


 4/26/20 21:59 DC 4/25/20 22:03


75 MLS/HR


 


 Sodium Chloride  1,000 ml @ 


 25 mls/hr  Q24H  4/27/20 14:30


   UNV  





 


 Sodium Chloride


  (Normal Saline


 Flush)  3 ml  QSHIFT  PRN  4/27/20 13:45


     





 


 Sodium Chloride


 90 meq/Calcium


 Gluconate 10 meq/


 Multivitamins 10


 ml/Chromium/


 Copper/Manganese/


 Seleni/Zn 0.5 ml/


 Total Parenteral


 Nutrition/Amino


 Acids/Dextrose/


 Fat Emulsion


 Intravenous  1,512 ml @ 


 63 mls/hr  TPN  CONT  3/18/20 22:00


 3/19/20 21:59 DC 3/18/20 22:06


63 MLS/HR


 


 Sodium Chloride


 90 meq/Calcium


 Gluconate 10 meq/


 Multivitamins 10


 ml/Chromium/


 Copper/Manganese/


 Seleni/Zn 1 ml/


 Total Parenteral


 Nutrition/Amino


 Acids/Dextrose/


 Fat Emulsion


 Intravenous  55.005 ml


  @ 2.292


 mls/hr  TPN  CONT  3/18/20 22:00


 3/18/20 12:33 DC  





 


 Sodium Chloride


 90 meq/Magnesium


 Sulfate 10 meq/


 Calcium Gluconate


 20 meq/


 Multivitamins 10


 ml/Chromium/


 Copper/Manganese/


 Seleni/Zn 0.5 ml/


 Total Parenteral


 Nutrition/Amino


 Acids/Dextrose/


 Fat Emulsion


 Intravenous  1,512 ml @ 


 63 mls/hr  TPN  CONT  3/19/20 22:00


 3/20/20 21:59 DC 3/19/20 22:25


63 MLS/HR


 


 Sodium Chloride


 90 meq/Magnesium


 Sulfate 12 meq/


 Calcium Gluconate


 15 meq/


 Multivitamins 10


 ml/Chromium/


 Copper/Manganese/


 Seleni/Zn 0.5 ml/


 Insulin Human


 Regular 25 unit/


 Total Parenteral


 Nutrition/Amino


 Acids/Dextrose/


 Fat Emulsion


 Intravenous  1,400 ml @ 


 58.333 mls/


 hr  TPN  CONT  4/8/20 22:00


 4/9/20 21:59 DC 4/8/20 21:41


58.333 MLS/HR


 


 Sodium Chloride


 90 meq/Potassium


 Chloride 15 meq/


 Magnesium Sulfate


 12 meq/Calcium


 Gluconate 15 meq/


 Multivitamins 10


 ml/Chromium/


 Copper/Manganese/


 Seleni/Zn 0.5 ml/


 Insulin Human


 Regular 25 unit/


 Total Parenteral


 Nutrition/Amino


 Acids/Dextrose/


 Fat Emulsion


 Intravenous  1,400 ml @ 


 58.333 mls/


 hr  TPN  CONT  4/7/20 22:00


 4/8/20 21:59 DC 4/7/20 22:13


58.333 MLS/HR


 


 Sodium Chloride


 90 meq/Potassium


 Chloride 15 meq/


 Potassium


 Phosphate 10 mmol/


 Magnesium Sulfate


 8 meq/Calcium


 Gluconate 15 meq/


 Multivitamins 10


 ml/Chromium/


 Copper/Manganese/


 Seleni/Zn 0.5 ml/


 Insulin Human


 Regular 25 unit/


 Total Parenteral


 Nutrition/Amino


 Acids/Dextrose/


 Fat Emulsion


 Intravenous  1,400 ml @ 


 58.333 mls/


 hr  TPN  CONT  4/5/20 22:00


 4/6/20 21:59 DC 4/5/20 21:20


58.333 MLS/HR


 


 Sodium Chloride


 90 meq/Potassium


 Chloride 15 meq/


 Potassium


 Phosphate 10 mmol/


 Magnesium Sulfate


 10 meq/Calcium


 Gluconate 20 meq/


 Multivitamins 10


 ml/Chromium/


 Copper/Manganese/


 Seleni/Zn 0.5 ml/


 Total Parenteral


 Nutrition/Amino


 Acids/Dextrose/


 Fat Emulsion


 Intravenous  1,400 ml @ 


 58.333 mls/


 hr  TPN  CONT  3/23/20 22:00


 3/24/20 21:59 DC 3/23/20 21:42


58.333 MLS/HR


 


 Sodium Chloride


 90 meq/Potassium


 Chloride 15 meq/


 Potassium


 Phosphate 10 mmol/


 Magnesium Sulfate


 12 meq/Calcium


 Gluconate 15 meq/


 Multivitamins 10


 ml/Chromium/


 Copper/Manganese/


 Seleni/Zn 0.5 ml/


 Insulin Human


 Regular 25 unit/


 Total Parenteral


 Nutrition/Amino


 Acids/Dextrose/


 Fat Emulsion


 Intravenous  1,400 ml @ 


 58.333 mls/


 hr  TPN  CONT  4/6/20 22:00


 4/7/20 21:59 DC 4/6/20 22:24


58.333 MLS/HR


 


 Sodium Chloride


 90 meq/Potassium


 Chloride 15 meq/


 Potassium


 Phosphate 15 mmol/


 Magnesium Sulfate


 10 meq/Calcium


 Gluconate 15 meq/


 Multivitamins 10


 ml/Chromium/


 Copper/Manganese/


 Seleni/Zn 0.5 ml/


 Total Parenteral


 Nutrition/Amino


 Acids/Dextrose/


 Fat Emulsion


 Intravenous  1,400 ml @ 


 58.333 mls/


 hr  TPN  CONT  3/24/20 22:00


 3/25/20 21:59 DC 3/24/20 22:17


58.333 MLS/HR


 


 Sodium Chloride


 90 meq/Potassium


 Chloride 15 meq/


 Potassium


 Phosphate 15 mmol/


 Magnesium Sulfate


 10 meq/Calcium


 Gluconate 20 meq/


 Multivitamins 10


 ml/Chromium/


 Copper/Manganese/


 Seleni/Zn 0.5 ml/


 Total Parenteral


 Nutrition/Amino


 Acids/Dextrose/


 Fat Emulsion


 Intravenous  1,200 ml @ 


 50 mls/hr  TPN  CONT  3/22/20 22:00


 3/22/20 14:17 DC  





 


 Sodium Chloride


 90 meq/Potassium


 Chloride 15 meq/


 Potassium


 Phosphate 18 mmol/


 Magnesium Sulfate


 8 meq/Calcium


 Gluconate 15 meq/


 Multivitamins 10


 ml/Chromium/


 Copper/Manganese/


 Seleni/Zn 0.5 ml/


 Insulin Human


 Regular 10 unit/


 Total Parenteral


 Nutrition/Amino


 Acids/Dextrose/


 Fat Emulsion


 Intravenous  1,400 ml @ 


 58.333 mls/


 hr  TPN  CONT  3/27/20 22:00


 3/28/20 21:59 DC 3/27/20 21:43


58.333 MLS/HR


 


 Sodium Chloride


 90 meq/Potassium


 Chloride 15 meq/


 Potassium


 Phosphate 18 mmol/


 Magnesium Sulfate


 8 meq/Calcium


 Gluconate 15 meq/


 Multivitamins 10


 ml/Chromium/


 Copper/Manganese/


 Seleni/Zn 0.5 ml/


 Insulin Human


 Regular 15 unit/


 Total Parenteral


 Nutrition/Amino


 Acids/Dextrose/


 Fat Emulsion


 Intravenous  1,400 ml @ 


 58.333 mls/


 hr  TPN  CONT  3/30/20 22:00


 3/31/20 21:59 DC 3/30/20 21:47


58.333 MLS/HR


 


 Sodium Chloride


 90 meq/Potassium


 Chloride 15 meq/


 Potassium


 Phosphate 18 mmol/


 Magnesium Sulfate


 8 meq/Calcium


 Gluconate 15 meq/


 Multivitamins 10


 ml/Chromium/


 Copper/Manganese/


 Seleni/Zn 0.5 ml/


 Insulin Human


 Regular 20 unit/


 Total Parenteral


 Nutrition/Amino


 Acids/Dextrose/


 Fat Emulsion


 Intravenous  1,400 ml @ 


 58.333 mls/


 hr  TPN  CONT  4/2/20 22:00


 4/3/20 21:59 DC 4/2/20 22:45


58.333 MLS/HR


 


 Sodium Chloride


 90 meq/Potassium


 Chloride 15 meq/


 Potassium


 Phosphate 18 mmol/


 Magnesium Sulfate


 8 meq/Calcium


 Gluconate 15 meq/


 Multivitamins 10


 ml/Chromium/


 Copper/Manganese/


 Seleni/Zn 0.5 ml/


 Total Parenteral


 Nutrition/Amino


 Acids/Dextrose/


 Fat Emulsion


 Intravenous  1,400 ml @ 


 58.333 mls/


 hr  TPN  CONT  3/26/20 22:00


 3/27/20 21:59 DC 3/26/20 22:00


58.333 MLS/HR


 


 Sodium Chloride


 90 meq/Potassium


 Phosphate 15 mmol/


 Magnesium Sulfate


 12 meq/Calcium


 Gluconate 15 meq/


 Multivitamins 10


 ml/Chromium/


 Copper/Manganese/


 Seleni/Zn 0.5 ml/


 Insulin Human


 Regular 30 unit/


 Total Parenteral


 Nutrition/Amino


 Acids/Dextrose/


 Fat Emulsion


 Intravenous  1,400 ml @ 


 58.333 mls/


 hr  TPN  CONT  4/10/20 22:00


 4/11/20 21:59 DC 4/10/20 21:49


58.333 MLS/HR


 


 Sodium Chloride


 90 meq/Potassium


 Phosphate 15 mmol/


 Magnesium Sulfate


 12 meq/Calcium


 Gluconate 15 meq/


 Multivitamins 10


 ml/Chromium/


 Copper/Manganese/


 Seleni/Zn 0.5 ml/


 Insulin Human


 Regular 40 unit/


 Total Parenteral


 Nutrition/Amino


 Acids/Dextrose/


 Fat Emulsion


 Intravenous  1,400 ml @ 


 58.333 mls/


 hr  TPN  CONT  4/11/20 22:00


 4/12/20 21:59 DC 4/11/20 21:21


58.333 MLS/HR


 


 Sodium Chloride


 90 meq/Potassium


 Phosphate 19 mmol/


 Magnesium Sulfate


 12 meq/Calcium


 Gluconate 15 meq/


 Multivitamins 10


 ml/Chromium/


 Copper/Manganese/


 Seleni/Zn 0.5 ml/


 Insulin Human


 Regular 40 unit/


 Total Parenteral


 Nutrition/Amino


 Acids/Dextrose/


 Fat Emulsion


 Intravenous  1,400 ml @ 


 58.333 mls/


 hr  TPN  CONT  4/12/20 22:00


 4/13/20 21:59 DC 4/12/20 21:54


58.333 MLS/HR


 


 Sodium Chloride


 90 meq/Potassium


 Phosphate 5 mmol/


 Magnesium Sulfate


 12 meq/Calcium


 Gluconate 15 meq/


 Multivitamins 10


 ml/Chromium/


 Copper/Manganese/


 Seleni/Zn 0.5 ml/


 Insulin Human


 Regular 30 unit/


 Total Parenteral


 Nutrition/Amino


 Acids/Dextrose/


 Fat Emulsion


 Intravenous  1,400 ml @ 


 58.333 mls/


 hr  TPN  CONT  4/9/20 22:00


 4/10/20 21:59 DC 4/9/20 22:08


58.333 MLS/HR


 


 Sodium Chloride


 100 meq/Potassium


 Chloride 40 meq/


 Magnesium Sulfate


 15 meq/Calcium


 Gluconate 15 meq/


 Multivitamins 10


 ml/Chromium/


 Copper/Manganese/


 Seleni/Zn 0.5 ml/


 Insulin Human


 Regular 35 unit/


 Total Parenteral


 Nutrition/Amino


 Acids/Dextrose/


 Fat Emulsion


 Intravenous  1,400 ml @ 


 58.333 mls/


 hr  TPN  CONT  4/19/20 22:00


 4/20/20 21:59 DC 4/19/20 22:46


58.333 MLS/HR


 


 Sodium Chloride


 100 meq/Potassium


 Chloride 40 meq/


 Magnesium Sulfate


 20 meq/Calcium


 Gluconate 10 meq/


 Multivitamins 10


 ml/Chromium/


 Copper/Manganese/


 Seleni/Zn 0.5 ml/


 Insulin Human


 Regular 35 unit/


 Total Parenteral


 Nutrition/Amino


 Acids/Dextrose/


 Fat Emulsion


 Intravenous  1,400 ml @ 


 58.333 mls/


 hr  TPN  CONT  4/23/20 22:00


 4/24/20 21:59 DC 4/24/20 00:06


58.333 MLS/HR


 


 Sodium Chloride


 100 meq/Potassium


 Chloride 40 meq/


 Magnesium Sulfate


 20 meq/Calcium


 Gluconate 15 meq/


 Multivitamins 10


 ml/Chromium/


 Copper/Manganese/


 Seleni/Zn 0.5 ml/


 Insulin Human


 Regular 35 unit/


 Total Parenteral


 Nutrition/Amino


 Acids/Dextrose/


 Fat Emulsion


 Intravenous  1,400 ml @ 


 58.333 mls/


 hr  TPN  CONT  4/22/20 22:00


 4/23/20 21:59 DC 4/22/20 22:27


58.333 MLS/HR


 


 Sodium Chloride


 100 meq/Potassium


 Phosphate 10 mmol/


 Magnesium Sulfate


 12 meq/Calcium


 Gluconate 15 meq/


 Multivitamins 10


 ml/Chromium/


 Copper/Manganese/


 Seleni/Zn 0.5 ml/


 Insulin Human


 Regular 35 unit/


 Potassium


 Chloride 20 meq/


 Total Parenteral


 Nutrition/Amino


 Acids/Dextrose/


 Fat Emulsion


 Intravenous  1,400 ml @ 


 58.333 mls/


 hr  TPN  CONT  4/16/20 22:00


 4/17/20 21:59 DC 4/16/20 22:10


58.333 MLS/HR


 


 Sodium Chloride


 100 meq/Potassium


 Phosphate 19 mmol/


 Magnesium Sulfate


 12 meq/Calcium


 Gluconate 15 meq/


 Multivitamins 10


 ml/Chromium/


 Copper/Manganese/


 Seleni/Zn 0.5 ml/


 Insulin Human


 Regular 40 unit/


 Potassium


 Chloride 20 meq/


 Total Parenteral


 Nutrition/Amino


 Acids/Dextrose/


 Fat Emulsion


 Intravenous  1,400 ml @ 


 58.333 mls/


 hr  TPN  CONT  4/15/20 22:00


 4/16/20 21:59 DC 4/15/20 21:20


58.333 MLS/HR


 


 Sodium Chloride


 100 meq/Potassium


 Phosphate 5 mmol/


 Magnesium Sulfate


 12 meq/Calcium


 Gluconate 15 meq/


 Multivitamins 10


 ml/Chromium/


 Copper/Manganese/


 Seleni/Zn 0.5 ml/


 Insulin Human


 Regular 35 unit/


 Potassium


 Chloride 20 meq/


 Total Parenteral


 Nutrition/Amino


 Acids/Dextrose/


 Fat Emulsion


 Intravenous  1,400 ml @ 


 58.333 mls/


 hr  TPN  CONT  4/17/20 22:00


 4/18/20 21:59 DC 4/17/20 22:59


58.333 MLS/HR


 


 Succinylcholine


 Chloride


  (Anectine)  120 mg  1X  ONCE  3/23/20 08:30


 3/23/20 08:31 DC 3/23/20 08:34


120 MG


 


 Vecuronium Bromide


  (Norcuron Bolus)  6 mg  PRN Q6HRS  PRN  5/7/20 19:15


 5/7/20 19:35 DC  














Labs:


Lab





Laboratory Tests








Test


 5/19/20


12:29 5/19/20


17:42 5/19/20


23:41 5/20/20


06:45


 


Glucose (Fingerstick)


 251 mg/dL


(70-99) 122 mg/dL


(70-99) 207 mg/dL


(70-99) 





 


Sodium Level


 


 


 


 140 mmol/L


(136-145)


 


Potassium Level


 


 


 


 3.9 mmol/L


(3.5-5.1)


 


Chloride Level


 


 


 


 99 mmol/L


()


 


Carbon Dioxide Level


 


 


 


 41 mmol/L


(21-32)


 


Anion Gap    0 (6-14) 


 


Blood Urea Nitrogen


 


 


 


 30 mg/dL


(7-20)


 


Creatinine


 


 


 


 0.7 mg/dL


(0.6-1.0)


 


Estimated GFR


(Cockcroft-Gault) 


 


 


 88.9 





 


Glucose Level


 


 


 


 147 mg/dL


(70-99)


 


Calcium Level


 


 


 


 8.8 mg/dL


(8.5-10.1)


 


Test


 5/20/20


06:53 


 


 





 


Glucose (Fingerstick)


 146 mg/dL


(70-99) 


 


 














Objective:


Assessment:


Fever intermittent could be from underlying pancreatitis,  


Acute pancreatitis with persistent  necrosis


CT a/p 4/9


    Increased ascites. Persistent evidence of necrotizing pancreatitis with fl

uid and phlegmon


   at the pancreas


   4/27 status post ROBERT drain placement; 


Cholelithiasis with thickening of the gallbladder wall.


Leucocytosis 


JED,Hyperkalemia, Metabolic acidosis off dialysis


Acute hypoxic resp failure ,bilateral pleural effusion and atelectasis


hypocalcemia 


Prediabetes


HTN


s/p trach





Plan:


Plan of Care


Dc Zyvox


 cont Dapto 5/17


cont merrem 4/8


Continue micafungin 5/4


f/u cultures ,BC neg 5/17


Maintain aspiration precaution


Supportive care











IVAN FRANZ MD           May 20, 2020 08:11

## 2020-05-20 NOTE — PDOC
PULMONARY PROGRESS NOTES


Subjective


Patient intubated on 3/23 , s/p trach 4/6, awake alert follows commands, is 

weak, small trach secretion, trach has been capped over 48 hrs


placed on cap trach, at times patient having some respiratory distress


s/p left tap 5/12 , 3 litres removed


Vitals





Vital Signs








  Date Time  Temp Pulse Resp B/P (MAP) Pulse Ox O2 Delivery O2 Flow Rate FiO2


 


5/20/20 10:10     100 Tracheal Collar 8.0 


 


5/20/20 07:00  72 14 100/58 (72)    


 


5/20/20 04:00 98.2       





 98.2       








ROS:  No Chest Pain, No Abdominal Pain, No Increase Cough


General:  Alert, No acute distress


HEENT:  Other (nc at perrl     neck trach site ok  no lad  no thyromegaly)


Lungs:  Wheezing, Other (decrease bs)


Cardiovascular:  S1, S2


Abdomen:  Soft, Non-tender, Other (distended)


Neuro Exam:  Alert


Extremities:  Other (+3 generalized edema )


Skin:  Warm, Dry


Labs





Laboratory Tests








Test


 5/18/20


17:03 5/18/20


23:33 5/19/20


00:09 5/19/20


05:10


 


Glucose (Fingerstick)


 179 mg/dL


(70-99) 


 227 mg/dL


(70-99) 





 


O2 Saturation  98 % (92-99)   


 


Arterial Blood pH


 


 7.42


(7.35-7.45) 


 





 


Arterial Blood pCO2 at


Patient Temp 


 78 mmHg


(35-46) 


 





 


Arterial Blood pO2 at Patient


Temp 


 122 mmHg


() 


 





 


Arterial Blood HCO3


 


 49 mmol/L


(21-28) 


 





 


Arterial Blood Base Excess


 


 22 mmol/L


(-3-3) 


 





 


FiO2  32   


 


White Blood Count


 


 


 


 11.3 x10^3/uL


(4.0-11.0)


 


Red Blood Count


 


 


 


 2.58 x10^6/uL


(3.50-5.40)


 


Hemoglobin


 


 


 


 7.4 g/dL


(12.0-15.5)


 


Hematocrit


 


 


 


 22.7 %


(36.0-47.0)


 


Mean Corpuscular Volume    88 fL () 


 


Mean Corpuscular Hemoglobin    29 pg (25-35) 


 


Mean Corpuscular Hemoglobin


Concent 


 


 


 32 g/dL


(31-37)


 


Red Cell Distribution Width


 


 


 


 18.1 %


(11.5-14.5)


 


Platelet Count


 


 


 


 485 x10^3/uL


(140-400)


 


Neutrophils (%) (Auto)    74 % (31-73) 


 


Lymphocytes (%) (Auto)    19 % (24-48) 


 


Monocytes (%) (Auto)    7 % (0-9) 


 


Eosinophils (%) (Auto)    1 % (0-3) 


 


Basophils (%) (Auto)    0 % (0-3) 


 


Neutrophils # (Auto)


 


 


 


 8.3 x10^3/uL


(1.8-7.7)


 


Lymphocytes # (Auto)


 


 


 


 2.1 x10^3/uL


(1.0-4.8)


 


Monocytes # (Auto)


 


 


 


 0.7 x10^3/uL


(0.0-1.1)


 


Eosinophils # (Auto)


 


 


 


 0.1 x10^3/uL


(0.0-0.7)


 


Basophils # (Auto)


 


 


 


 0.0 x10^3/uL


(0.0-0.2)


 


Test


 5/19/20


05:20 5/19/20


08:00 5/19/20


12:29 5/19/20


17:42


 


Glucose (Fingerstick)


 153 mg/dL


(70-99) 


 251 mg/dL


(70-99) 122 mg/dL


(70-99)


 


O2 Saturation  97 % (92-99)   


 


Arterial Blood pH


 


 7.67


(7.35-7.45) 


 





 


Arterial Blood pCO2 at


Patient Temp 


 37 mmHg


(35-46) 


 





 


Arterial Blood pO2 at Patient


Temp 


 80 mmHg


() 


 





 


Arterial Blood HCO3


 


 42 mmol/L


(21-28) 


 





 


Arterial Blood Base Excess


 


 20 mmol/L


(-3-3) 


 





 


FiO2  40   


 


Test


 5/19/20


23:41 5/20/20


06:45 5/20/20


06:53 5/20/20


09:30


 


Glucose (Fingerstick)


 207 mg/dL


(70-99) 


 146 mg/dL


(70-99) 





 


Sodium Level


 


 140 mmol/L


(136-145) 


 





 


Potassium Level


 


 3.9 mmol/L


(3.5-5.1) 


 





 


Chloride Level


 


 99 mmol/L


() 


 





 


Carbon Dioxide Level


 


 41 mmol/L


(21-32) 


 





 


Anion Gap  0 (6-14)   


 


Blood Urea Nitrogen


 


 30 mg/dL


(7-20) 


 





 


Creatinine


 


 0.7 mg/dL


(0.6-1.0) 


 





 


Estimated GFR


(Cockcroft-Gault) 


 88.9 


 


 





 


Glucose Level


 


 147 mg/dL


(70-99) 


 





 


Calcium Level


 


 8.8 mg/dL


(8.5-10.1) 


 





 


O2 Saturation    97 % (92-99) 


 


Arterial Blood pH


 


 


 


 7.50


(7.35-7.45)


 


Arterial Blood pCO2 at


Patient Temp 


 


 


 54 mmHg


(35-46)


 


Arterial Blood pO2 at Patient


Temp 


 


 


 106 mmHg


()


 


Arterial Blood HCO3


 


 


 


 41 mmol/L


(21-28)


 


Arterial Blood Base Excess


 


 


 


 16 mmol/L


(-3-3)


 


FiO2    30% trial 


 


Test


 5/20/20


12:31 


 


 





 


Glucose (Fingerstick)


 258 mg/dL


(70-99) 


 


 











Laboratory Tests








Test


 5/19/20


17:42 5/19/20


23:41 5/20/20


06:45 5/20/20


06:53


 


Glucose (Fingerstick)


 122 mg/dL


(70-99) 207 mg/dL


(70-99) 


 146 mg/dL


(70-99)


 


Sodium Level


 


 


 140 mmol/L


(136-145) 





 


Potassium Level


 


 


 3.9 mmol/L


(3.5-5.1) 





 


Chloride Level


 


 


 99 mmol/L


() 





 


Carbon Dioxide Level


 


 


 41 mmol/L


(21-32) 





 


Anion Gap   0 (6-14)  


 


Blood Urea Nitrogen


 


 


 30 mg/dL


(7-20) 





 


Creatinine


 


 


 0.7 mg/dL


(0.6-1.0) 





 


Estimated GFR


(Cockcroft-Gault) 


 


 88.9 


 





 


Glucose Level


 


 


 147 mg/dL


(70-99) 





 


Calcium Level


 


 


 8.8 mg/dL


(8.5-10.1) 





 


Test


 5/20/20


09:30 5/20/20


12:31 


 





 


O2 Saturation 97 % (92-99)    


 


Arterial Blood pH


 7.50


(7.35-7.45) 


 


 





 


Arterial Blood pCO2 at


Patient Temp 54 mmHg


(35-46) 


 


 





 


Arterial Blood pO2 at Patient


Temp 106 mmHg


() 


 


 





 


Arterial Blood HCO3


 41 mmol/L


(21-28) 


 


 





 


Arterial Blood Base Excess


 16 mmol/L


(-3-3) 


 


 





 


FiO2 30% trial    


 


Glucose (Fingerstick)


 


 258 mg/dL


(70-99) 


 











Medications





Active Scripts








 Medications  Dose


 Route/Sig


 Max Daily Dose Days Date Category


 


 Bisoprolol


 Fumarate 5 Mg


 Tablet  10 Mg


 PO DAILY


   3/16/20 Reported








Comments


CXR  reviewed.





Impression


.


IMPRESSION:


1.  Acute hypoxemic respiratory failure secondary to ARDS status post trach,


2.  Gallstone pancreatitis


3.  Severe metabolic acidosis.stable


4.  Acute kidney injury-stable, Off HD-- continue to improve 


5.  Acute gallstone pancreatitis.


6.  Hypoalbuminemia.


7.  Moderate persistent effusions, s/p left thora  5/12


8.  Fever-  Per ID, per surgery--resolved 


9.  Chronic anemia


10. Covid 19 testing negative


11. Moderate to large ascites-S/P paracentisis


12.S/P paracentisis with 4 liters removed on 4/15/20


13. S/P IR drain placement on 5/8/2020





Plan


.


Patient went outdoors today, I did not see her





Lasix 40 mg twice daily





Patient placed on assist control last evening for respiratory distress


s/p  thoracentesis, 5/12, 3 litres removed


cap trach as tolerated


Follow surgery recs-- S/P 3 drain placed in IR on 5/8/2020


Follow ID recs for ABX


Follow nephrology recs 


Continue TPN 


DVT/GI PPX: heparin SQ/ protonix 


D/W RN and RT, pt





CODE:FULL











STEVE MIRANDA MD              May 20, 2020 13:04

## 2020-05-20 NOTE — PDOC
PROGRESS NOTES


Chief Complaint


Chief Complaint


Acute hypoxic Respiratory failure requiring mechanical ventilation (now 

extubated for several days but still with tracheostomy)


Tracheostomy


bilateral pleural effusions/pulm edema 


Sepsis


Severe Acute gallstone pancreatitis (not a surgical candidate at this time) with

necrosis


Acute kidney failure now requiring dialysis


Salpingitis


Gallstones (Calculus of gallbladder with acute cholecystitis without 

obstruction)


HTN 


Leukocytosis 


Hypoxia


Uterine fibroid


Intractable pain


Intractable nausea


Covid 19 negative. 


Acute on chronic anemia 


EEG: No seizure activityFever  - better currently - intermittent could be from 

underlying pancreatitis blood cults 5/4 - neg so far


? Ileus with vomiting


Abd distention - U/S and CT reviewed s/p 0.4 L of opaque, debris-containing 

ascites was removed 5/6


Acute pancreatitis with persistent necrosis


- 4/27 status post ROBERT drain placement + C paropsilosis. s/p additional drains 

5/8


Anemia - S/p PRBCs


Cholelithiasis with thickening of the gallbladder wall.


Leucocytosis improving


JED, hyperkalemia, Metabolic acidosis off dialysis


Acute hypoxic resp failure ,bilateral pleural effusion and atelectasis


hypocalcemia 


Prediabetes


HTN


s/p trach


ESRD on HD


Hyperglycemia





History of Present Illness


History of Present Illness


5/20/2020 


She remains in the ICU


sitting up and working with OT,   getting better


if limit pain meds, may do better off the vent, 





Nurses trying to suction her,  that is also improved, 


Chart reviewed


 








5/19/2020


Patient seen and examined in the ICU


She had an episode yesterday of tachycardia and severe agitation we gave her 

some Ativan


After that she seemed to have stroke symptoms but now that the Ativan has wore 

off her stroke symptoms have resolved


 


 


She is on IV meropenem and daptomycin and micafungin


Chart reviewed


Discussed with RN


Patient is still critically ill


 


BRIEF OPERATIVE NOTE


Pre-Op Diagnosis


Pancreatitis with pseudocysts, suspected infection


Post-Op Diagnosis


same


Procedure Performed


CT abdominal Drains x 3


Surgeon


Hardy


Anesthesia Type:  Conscious Sedation


Findings


3 abdominal drains, 14F, with turbid pancreatic fluid and necrotic debris in 

each.


Complications


No immediate








5/9: Patient today somewhat restless and having bilious secretions from ET tube,

imaging studies ordered, discussed with consultant. Pretty poor prognosis, 

hopefully is not a fistula, poor surgical candidate. 





5/10: Imaging with no acute events, she seems more stable today compared to 

yesterday. Encouraged as much activity as possible patient at high risk for 

severe depression.





Vitals


Vitals





Vital Signs








  Date Time  Temp Pulse Resp B/P (MAP) Pulse Ox O2 Delivery O2 Flow Rate FiO2


 


5/20/20 10:10     100 Tracheal Collar 8.0 


 


5/20/20 07:00  72 14 100/58 (72)    


 


5/20/20 04:00 98.2       





 98.2       











Physical Exam


Physical Exam


GENERAL: Severely encephalopathic trying to talk but has a lot of secretions and

is confused


HEENT:  oral cavity dry, NGT


NECK:  Trach with speaking valve


LUNGS: no rhonchi


HEART:  S1, S2, regular 


ABDOMEN: mod Distended, hypoactive BS, tender, + drainsx1


: [place] (4/14)


EXTREMITIES: Generalized edema, improving, no cyanosis, SCDs bilaterally 


SKIN: Warm and dry.  No generalized rash.  


CNS: Very weak 


PICC(4/30) clean


General:  Alert, No acute distress


Heart:  Regular rate, Normal S1, Normal S2, No murmurs, Gallops


Lungs:  Wheezing, Other (decrease bs)


Abdomen:  Soft, Other (drains in place)


Extremities:  No clubbing, No cyanosis, No edema, Normal pulses, No 

tenderness/swelling


Skin:  Other (warm, dry)





Labs


LABS





Laboratory Tests








Test


 5/19/20


12:29 5/19/20


17:42 5/19/20


23:41 5/20/20


06:45


 


Glucose (Fingerstick)


 251 mg/dL


(70-99) 122 mg/dL


(70-99) 207 mg/dL


(70-99) 





 


Sodium Level


 


 


 


 140 mmol/L


(136-145)


 


Potassium Level


 


 


 


 3.9 mmol/L


(3.5-5.1)


 


Chloride Level


 


 


 


 99 mmol/L


()


 


Carbon Dioxide Level


 


 


 


 41 mmol/L


(21-32)


 


Anion Gap    0 (6-14) 


 


Blood Urea Nitrogen


 


 


 


 30 mg/dL


(7-20)


 


Creatinine


 


 


 


 0.7 mg/dL


(0.6-1.0)


 


Estimated GFR


(Cockcroft-Gault) 


 


 


 88.9 





 


Glucose Level


 


 


 


 147 mg/dL


(70-99)


 


Calcium Level


 


 


 


 8.8 mg/dL


(8.5-10.1)


 


Test


 5/20/20


06:53 5/20/20


09:30 


 





 


Glucose (Fingerstick)


 146 mg/dL


(70-99) 


 


 





 


O2 Saturation  97 % (92-99)   


 


Arterial Blood pH


 


 7.50


(7.35-7.45) 


 





 


Arterial Blood pCO2 at


Patient Temp 


 54 mmHg


(35-46) 


 





 


Arterial Blood pO2 at Patient


Temp 


 106 mmHg


() 


 





 


Arterial Blood HCO3


 


 41 mmol/L


(21-28) 


 





 


Arterial Blood Base Excess


 


 16 mmol/L


(-3-3) 


 





 


FiO2  30% trial   











Assessment and Plan


Assessmemt and Plan


Problems


Medical Problems:


(1) Acute pancreatitis


Status: Acute  





(2) Cholelithiasis


Status: Acute  











Comment


Review of Relevant


I have reviewed the following items josy (where applicable) has been applied.


Labs





Laboratory Tests








Test


 5/18/20


17:03 5/18/20


23:33 5/19/20


00:09 5/19/20


05:10


 


Glucose (Fingerstick)


 179 mg/dL


(70-99) 


 227 mg/dL


(70-99) 





 


O2 Saturation  98 % (92-99)   


 


Arterial Blood pH


 


 7.42


(7.35-7.45) 


 





 


Arterial Blood pCO2 at


Patient Temp 


 78 mmHg


(35-46) 


 





 


Arterial Blood pO2 at Patient


Temp 


 122 mmHg


() 


 





 


Arterial Blood HCO3


 


 49 mmol/L


(21-28) 


 





 


Arterial Blood Base Excess


 


 22 mmol/L


(-3-3) 


 





 


FiO2  32   


 


White Blood Count


 


 


 


 11.3 x10^3/uL


(4.0-11.0)


 


Red Blood Count


 


 


 


 2.58 x10^6/uL


(3.50-5.40)


 


Hemoglobin


 


 


 


 7.4 g/dL


(12.0-15.5)


 


Hematocrit


 


 


 


 22.7 %


(36.0-47.0)


 


Mean Corpuscular Volume    88 fL () 


 


Mean Corpuscular Hemoglobin    29 pg (25-35) 


 


Mean Corpuscular Hemoglobin


Concent 


 


 


 32 g/dL


(31-37)


 


Red Cell Distribution Width


 


 


 


 18.1 %


(11.5-14.5)


 


Platelet Count


 


 


 


 485 x10^3/uL


(140-400)


 


Neutrophils (%) (Auto)    74 % (31-73) 


 


Lymphocytes (%) (Auto)    19 % (24-48) 


 


Monocytes (%) (Auto)    7 % (0-9) 


 


Eosinophils (%) (Auto)    1 % (0-3) 


 


Basophils (%) (Auto)    0 % (0-3) 


 


Neutrophils # (Auto)


 


 


 


 8.3 x10^3/uL


(1.8-7.7)


 


Lymphocytes # (Auto)


 


 


 


 2.1 x10^3/uL


(1.0-4.8)


 


Monocytes # (Auto)


 


 


 


 0.7 x10^3/uL


(0.0-1.1)


 


Eosinophils # (Auto)


 


 


 


 0.1 x10^3/uL


(0.0-0.7)


 


Basophils # (Auto)


 


 


 


 0.0 x10^3/uL


(0.0-0.2)


 


Test


 5/19/20


05:20 5/19/20


08:00 5/19/20


12:29 5/19/20


17:42


 


Glucose (Fingerstick)


 153 mg/dL


(70-99) 


 251 mg/dL


(70-99) 122 mg/dL


(70-99)


 


O2 Saturation  97 % (92-99)   


 


Arterial Blood pH


 


 7.67


(7.35-7.45) 


 





 


Arterial Blood pCO2 at


Patient Temp 


 37 mmHg


(35-46) 


 





 


Arterial Blood pO2 at Patient


Temp 


 80 mmHg


() 


 





 


Arterial Blood HCO3


 


 42 mmol/L


(21-28) 


 





 


Arterial Blood Base Excess


 


 20 mmol/L


(-3-3) 


 





 


FiO2  40   


 


Test


 5/19/20


23:41 5/20/20


06:45 5/20/20


06:53 5/20/20


09:30


 


Glucose (Fingerstick)


 207 mg/dL


(70-99) 


 146 mg/dL


(70-99) 





 


Sodium Level


 


 140 mmol/L


(136-145) 


 





 


Potassium Level


 


 3.9 mmol/L


(3.5-5.1) 


 





 


Chloride Level


 


 99 mmol/L


() 


 





 


Carbon Dioxide Level


 


 41 mmol/L


(21-32) 


 





 


Anion Gap  0 (6-14)   


 


Blood Urea Nitrogen


 


 30 mg/dL


(7-20) 


 





 


Creatinine


 


 0.7 mg/dL


(0.6-1.0) 


 





 


Estimated GFR


(Cockcroft-Gault) 


 88.9 


 


 





 


Glucose Level


 


 147 mg/dL


(70-99) 


 





 


Calcium Level


 


 8.8 mg/dL


(8.5-10.1) 


 





 


O2 Saturation    97 % (92-99) 


 


Arterial Blood pH


 


 


 


 7.50


(7.35-7.45)


 


Arterial Blood pCO2 at


Patient Temp 


 


 


 54 mmHg


(35-46)


 


Arterial Blood pO2 at Patient


Temp 


 


 


 106 mmHg


()


 


Arterial Blood HCO3


 


 


 


 41 mmol/L


(21-28)


 


Arterial Blood Base Excess


 


 


 


 16 mmol/L


(-3-3)


 


FiO2    30% trial 








Laboratory Tests








Test


 5/19/20


12:29 5/19/20


17:42 5/19/20


23:41 5/20/20


06:45


 


Glucose (Fingerstick)


 251 mg/dL


(70-99) 122 mg/dL


(70-99) 207 mg/dL


(70-99) 





 


Sodium Level


 


 


 


 140 mmol/L


(136-145)


 


Potassium Level


 


 


 


 3.9 mmol/L


(3.5-5.1)


 


Chloride Level


 


 


 


 99 mmol/L


()


 


Carbon Dioxide Level


 


 


 


 41 mmol/L


(21-32)


 


Anion Gap    0 (6-14) 


 


Blood Urea Nitrogen


 


 


 


 30 mg/dL


(7-20)


 


Creatinine


 


 


 


 0.7 mg/dL


(0.6-1.0)


 


Estimated GFR


(Cockcroft-Gault) 


 


 


 88.9 





 


Glucose Level


 


 


 


 147 mg/dL


(70-99)


 


Calcium Level


 


 


 


 8.8 mg/dL


(8.5-10.1)


 


Test


 5/20/20


06:53 5/20/20


09:30 


 





 


Glucose (Fingerstick)


 146 mg/dL


(70-99) 


 


 





 


O2 Saturation  97 % (92-99)   


 


Arterial Blood pH


 


 7.50


(7.35-7.45) 


 





 


Arterial Blood pCO2 at


Patient Temp 


 54 mmHg


(35-46) 


 





 


Arterial Blood pO2 at Patient


Temp 


 106 mmHg


() 


 





 


Arterial Blood HCO3


 


 41 mmol/L


(21-28) 


 





 


Arterial Blood Base Excess


 


 16 mmol/L


(-3-3) 


 





 


FiO2  30% trial   








Microbiology


5/17/20 Blood Culture - Preliminary, Resulted


          NO GROWTH AFTER 3 DAYS


5/6/20 Fungal Culture - Final, Complete


         


5/6/20 Fungal Culture Result 1 - Final, Complete


         


4/30/20 Aerobic Culture - Final, Complete


          


4/30/20 Aerobic Culture Result 1 (ANSON) - Final, Complete


          


4/30/20 Gram Stain - Final, Complete


          


4/30/20 Gram Stain Result 1 (ANSON) - Final, Complete


          


4/30/20 Gram Stain Result 2 (ANSON) - Final, Complete


          


4/12/20 Urine Culture - Final, Complete


          


4/12/20 Urine Culture Result 1 (ANSON) - Final, Complete


Medications





Current Medications


Sodium Chloride 1,000 ml @  1,000 mls/hr Q1H IV  Last administered on 3/16/20at 

03:00;  Start 3/16/20 at 03:00;  Stop 3/16/20 at 03:59;  Status DC


Ondansetron HCl (Zofran) 4 mg 1X  ONCE IVP  Last administered on 3/16/20at 

03:27;  Start 3/16/20 at 03:00;  Stop 3/16/20 at 03:01;  Status DC


Morphine Sulfate (Morphine Sulfate) 4 mg 1X  ONCE IV ;  Start 3/16/20 at 03:00; 

Stop 3/16/20 at 03:01;  Status Cancel


Ketorolac Tromethamine (Toradol 30mg Vial) 30 mg 1X  ONCE IV  Last administered 

on 3/16/20at 02:54;  Start 3/16/20 at 03:00;  Stop 3/16/20 at 03:01;  Status DC


Fentanyl Citrate (Fentanyl 2ml Vial) 25 mcg 1X  ONCE IVP  Last administered on 

3/16/20at 03:23;  Start 3/16/20 at 03:30;  Stop 3/16/20 at 03:31;  Status DC


Fentanyl Citrate (Fentanyl 2ml Vial) 100 mcg STK-MED ONCE .ROUTE ;  Start 

3/16/20 at 03:18;  Stop 3/16/20 at 03:18;  Status DC


Iohexol (Omnipaque 350 Mg/ml) 90 ml 1X  ONCE IV  Last administered on 3/16/20at 

03:25;  Start 3/16/20 at 03:30;  Stop 3/16/20 at 03:31;  Status DC


Info (CONTRAST GIVEN -- Rx MONITORING) 1 each PRN DAILY  PRN MC SEE COMMENTS;  

Start 3/16/20 at 03:30;  Stop 3/18/20 at 03:29;  Status DC


Hydromorphone HCl (Dilaudid) 0.5 mg 1X  ONCE IV  Last administered on 3/16/20at 

03:55;  Start 3/16/20 at 04:30;  Stop 3/16/20 at 04:32;  Status DC


Ondansetron HCl (Zofran) 4 mg PRN Q8HRS  PRN IV NAUSEA/VOMITING 1ST CHOICE;  

Start 3/16/20 at 05:00;  Stop 3/16/20 at 09:27;  Status DC


Morphine Sulfate (Morphine Sulfate) 2 mg PRN Q2HR  PRN IV SEVERE PAIN 7-10 Last 

administered on 3/17/20at 12:26;  Start 3/16/20 at 05:00;  Stop 3/17/20 at 

14:15;  Status DC


Sodium Chloride 1,000 ml @  125 mls/hr Q8H IV  Last administered on 3/16/20at 

20:56;  Start 3/16/20 at 05:00;  Stop 3/17/20 at 04:59;  Status DC


Hydromorphone HCl (Dilaudid) 0.5 mg PRN Q3HRS  PRN IV SEVERE PAIN 7-10 Last 

administered on 3/17/20at 10:06;  Start 3/16/20 at 05:00;  Stop 3/17/20 at 

12:01;  Status DC


Piperacillin Sod/ Tazobactam Sod 4.5 gm/Sodium Chloride 100 ml @  200 mls/hr 1X 

ONCE IV  Last administered on 3/16/20at 05:44;  Start 3/16/20 at 06:00;  Stop 

3/16/20 at 06:29;  Status DC


Ondansetron HCl (Zofran) 4 mg PRN Q4HRS  PRN IV NAUSEA/VOMITING 1ST CHOICE Last 

administered on 5/19/20at 12:40;  Start 3/16/20 at 09:30


Insulin Human Lispro (HumaLOG) 0-9 UNITS Q6HRS SQ  Last administered on 

5/19/20at 23:44;  Start 3/16/20 at 09:30


Dextrose (Dextrose 50%-Water Syringe) 12.5 gm PRN Q15MIN  PRN IV SEE COMMENTS;  

Start 3/16/20 at 09:30


Pantoprazole Sodium (PROTONIX VIAL for IV PUSH) 40 mg DAILYAC IVP  Last 

administered on 5/20/20at 09:34;  Start 3/16/20 at 11:30


Prochlorperazine Edisylate (Compazine) 10 mg PRN Q6HRS  PRN IV NAUSEA/VOMITING, 

2nd CHOICE Last administered on 5/14/20at 06:11;  Start 3/16/20 at 17:45


Atenolol (Tenormin) 100 mg DAILY PO ;  Start 3/17/20 at 09:00;  Stop 3/16/20 at 

20:08;  Status DC


Metoprolol Tartrate (Lopressor Vial) 2.5 mg Q6HRS IVP  Last administered on 

3/17/20at 05:51;  Start 3/16/20 at 20:15;  Stop 3/17/20 at 10:02;  Status DC


Metoprolol Tartrate (Lopressor Vial) 5 mg Q6HRS IVP  Last administered on 

3/26/20at 00:12;  Start 3/17/20 at 10:15;  Stop 3/28/20 at 08:48;  Status DC


Hydromorphone HCl (Dilaudid) 1 mg PRN Q3HRS  PRN IV SEVERE PAIN 7-10 Last 

administered on 3/23/20at 05:13;  Start 3/17/20 at 12:00;  Stop 3/31/20 at 00:2

5;  Status DC


Lidocaine HCl (Buffered Lidocaine 1%) 3 ml STK-MED ONCE .ROUTE ;  Start 3/17/20 

at 12:55;  Stop 3/17/20 at 12:56;  Status DC


Albumin Human 500 ml @  125 mls/hr 1X  ONCE IV  Last administered on 3/17/20at 

14:33;  Start 3/17/20 at 14:30;  Stop 3/17/20 at 18:32;  Status DC


Norepinephrine Bitartrate 8 mg/ Dextrose 258 ml @  17.299 mls/ hr CONT  PRN IV 

PER PROTOCOL Last administered on 4/14/20at 12:48;  Start 3/17/20 at 15:30;  

Stop 4/17/20 at 09:19;  Status DC


Sodium Chloride 1,000 ml @  125 mls/hr Q8H IV  Last administered on 3/17/20at 

21:04;  Start 3/17/20 at 16:00;  Stop 3/18/20 at 02:42;  Status DC


Albumin Human 500 ml @  125 mls/hr PRN BID  PRN IV After every 2L NSS & BP < 

90mm Last administered on 4/1/20at 14:21;  Start 3/17/20 at 16:00


Iohexol (Omnipaque 300 Mg/ml) 60 ml 1X  ONCE IV  Last administered on 3/17/20at 

17:20;  Start 3/17/20 at 17:00;  Stop 3/17/20 at 17:01;  Status DC


Info (CONTRAST GIVEN -- Rx MONITORING) 1 each PRN DAILY  PRN MC SEE COMMENTS;  

Start 3/17/20 at 17:00;  Stop 3/19/20 at 16:59;  Status DC


Meropenem 1 gm/ Sodium Chloride 100 ml @  200 mls/hr Q8HRS IV  Last administered

on 3/18/20at 05:45;  Start 3/17/20 at 20:00;  Stop 3/18/20 at 08:48;  Status DC


Furosemide (Lasix) 40 mg 1X  ONCE IVP  Last administered on 3/17/20at 22:12;  

Start 3/17/20 at 22:30;  Stop 3/17/20 at 22:31;  Status DC


Calcium Chloride 1000 mg/Sodium Chloride 110 ml @  220 mls/hr 1X  ONCE IV  Last 

administered on 3/17/20at 22:11;  Start 3/17/20 at 22:30;  Stop 3/17/20 at 

22:59;  Status DC


Albuterol Sulfate (Ventolin Neb Soln) 2.5 mg 1X  ONCE NEB  Last administered on 

3/18/20at 00:56;  Start 3/17/20 at 22:30;  Stop 3/17/20 at 22:31;  Status DC


Insulin Human Regular (HumuLIN R VIAL) 5 unit 1X  ONCE IV  Last administered on 

3/17/20at 22:14;  Start 3/17/20 at 22:30;  Stop 3/17/20 at 22:31;  Status DC


Magnesium Sulfate 50 ml @ 25 mls/hr 1X  ONCE IV  Last administered on 3/18/20at 

02:57;  Start 3/18/20 at 03:00;  Stop 3/18/20 at 04:59;  Status DC


Calcium Gluconate 1000 mg/Sodium Chloride 110 ml @  220 mls/hr 1X  ONCE IV  Last

administered on 3/18/20at 02:46;  Start 3/18/20 at 03:00;  Stop 3/18/20 at 

03:29;  Status DC


Sodium Chloride 1,000 ml @  200 mls/hr Q5H IV  Last administered on 3/18/20at 

02:46;  Start 3/18/20 at 03:00;  Stop 3/18/20 at 10:21;  Status DC


Calcium Gluconate 1000 mg/Sodium Chloride 110 ml @  220 mls/hr 1X  ONCE IV  Last

administered on 3/18/20at 03:21;  Start 3/18/20 at 03:30;  Stop 3/18/20 at 

03:59;  Status DC


Sodium Bicarbonate 50 meq/Sodium Chloride 1,050 ml @  75 mls/hr Q14H IV  Last 

administered on 3/22/20at 21:10;  Start 3/18/20 at 07:30;  Stop 3/23/20 at 

10:28;  Status DC


Calcium Gluconate 2000 mg/Sodium Chloride 120 ml @  220 mls/hr 1X  ONCE IV  Last

administered on 3/18/20at 09:05;  Start 3/18/20 at 07:30;  Stop 3/18/20 at 

08:02;  Status DC


Lidocaine HCl (Xylocaine-Mpf 1% 2ml Vial) 2 ml STK-MED ONCE .ROUTE ;  Start 

3/18/20 at 08:47;  Stop 3/18/20 at 08:47;  Status DC


Meropenem 500 mg/ Sodium Chloride 50 ml @  100 mls/hr Q12HR IV  Last 

administered on 3/23/20at 21:01;  Start 3/18/20 at 18:00;  Stop 3/24/20 at 

07:58;  Status DC


Lidocaine HCl (Buffered Lidocaine 1%) 3 ml STK-MED ONCE .ROUTE ;  Start 3/18/20 

at 09:46;  Stop 3/18/20 at 09:46;  Status DC


Lidocaine HCl (Buffered Lidocaine 1%) 6 ml 1X  ONCE INJ  Last administered on 

3/18/20at 10:26;  Start 3/18/20 at 10:15;  Stop 3/18/20 at 10:16;  Status DC


Info (Tpn Per Pharmacy) 1 each PRN DAILY  PRN MC SEE COMMENTS Last administered 

on 5/20/20at 10:01;  Start 3/18/20 at 12:00


Sodium Chloride 1,000 ml @  1,000 mls/hr Q1H PRN IV hypotension;  Start 3/18/20 

at 12:07;  Stop 3/18/20 at 18:06;  Status DC


Diphenhydramine HCl (Benadryl) 25 mg 1X PRN  PRN IV ITCHING;  Start 3/18/20 at 

12:15;  Stop 3/19/20 at 12:14;  Status DC


Diphenhydramine HCl (Benadryl) 25 mg 1X PRN  PRN IV ITCHING;  Start 3/18/20 at 

12:15;  Stop 3/19/20 at 12:14;  Status DC


Sodium Chloride 1,000 ml @  400 mls/hr Q2H30M PRN IV PATENCY;  Start 3/18/20 at 

12:07;  Stop 3/19/20 at 00:06;  Status DC


Info (PHARMACY MONITORING -- do not chart) 1 each PRN DAILY  PRN MC SEE 

COMMENTS;  Start 3/18/20 at 12:15;  Stop 3/20/20 at 08:13;  Status DC


Sodium Chloride 90 meq/Calcium Gluconate 10 meq/ Multivitamins 10 ml/Chromium/ C

opper/Manganese/ Seleni/Zn 1 ml/ Total Parenteral Nutrition/Amino 

Acids/Dextrose/ Fat Emulsion Intravenous 55.005 ml  @ 2.292 mls/hr TPN  CONT IV 

;  Start 3/18/20 at 22:00;  Stop 3/18/20 at 12:33;  Status DC


Info (Tpn Per Pharmacy) 1 each PRN DAILY  PRN MC SEE COMMENTS;  Start 3/18/20 at

12:30;  Status UNV


Sodium Chloride 90 meq/Calcium Gluconate 10 meq/ Multivitamins 10 ml/Chromium/ 

Copper/Manganese/ Seleni/Zn 0.5 ml/ Total Parenteral Nutrition/Amino 

Acids/Dextrose/ Fat Emulsion Intravenous 1,512 ml @  63 mls/hr TPN  CONT IV  

Last administered on 3/18/20at 22:06;  Start 3/18/20 at 22:00;  Stop 3/19/20 at 

21:59;  Status DC


Calcium Carbonate/ Glycine (Tums) 500 mg PRN AFTMEALHC  PRN PO INDIGESTION;  

Start 3/18/20 at 17:45;  Stop 5/13/20 at 10:25;  Status DC


Calcium Gluconate (Calcium Gluconate) 2,000 mg 1X  ONCE IVP  Last administered 

on 3/19/20at 02:19;  Start 3/19/20 at 02:15;  Stop 3/19/20 at 02:16;  Status DC


Calcium Chloride 3000 mg/Sodium Chloride 1,030 ml @  50 mls/hr [id number] IV  Last 

administered on 3/21/20at 02:17;  Start 3/19/20 at 08:00;  Stop 3/21/20 at 

15:23;  Status DC


Lorazepam (Ativan Inj) 1 mg PRN Q4HRS  PRN IVP ANXIETY / AGITATION, 2nd choic 

Last administered on 4/17/20at 03:51;  Start 3/19/20 at 09:00;  Stop 4/17/20 at 

09:19;  Status DC


Sodium Chloride 1,000 ml @  1,000 mls/hr Q1H PRN IV hypotension;  Start 3/19/20 

at 08:56;  Stop 3/19/20 at 14:55;  Status DC


Albumin Human 200 ml @  200 mls/hr 1X PRN  PRN IV Hypotension;  Start 3/19/20 at

09:00;  Stop 3/19/20 at 14:59;  Status DC


Diphenhydramine HCl (Benadryl) 25 mg 1X PRN  PRN IV ITCHING;  Start 3/19/20 at 

09:00;  Stop 3/20/20 at 08:59;  Status DC


Diphenhydramine HCl (Benadryl) 25 mg 1X PRN  PRN IV ITCHING;  Start 3/19/20 at 

09:00;  Stop 3/20/20 at 08:59;  Status DC


Sodium Chloride 1,000 ml @  400 mls/hr Q2H30M PRN IV PATENCY;  Start 3/19/20 at 

08:56;  Stop 3/19/20 at 20:55;  Status DC


Info (PHARMACY MONITORING -- do not chart) 1 each PRN DAILY  PRN MC SEE 

COMMENTS;  Start 3/19/20 at 09:00;  Status UNV


Info (PHARMACY MONITORING -- do not chart) 1 each PRN DAILY  PRN MC SEE 

COMMENTS;  Start 3/19/20 at 09:00;  Stop 3/20/20 at 08:13;  Status DC


Digoxin (Lanoxin) 500 mcg 1X  ONCE IV  Last administered on 3/19/20at 10:04;  

Start 3/19/20 at 10:00;  Stop 3/19/20 at 10:01;  Status DC


Digoxin (Lanoxin) 125 mcg 1X  ONCE IV  Last administered on 3/19/20at 17:10;  

Start 3/19/20 at 18:00;  Stop 3/19/20 at 18:01;  Status DC


Magnesium Sulfate 100 ml @  25 mls/hr 1X  ONCE IV  Last administered on 

3/19/20at 12:48;  Start 3/19/20 at 13:00;  Stop 3/19/20 at 16:59;  Status DC


Sodium Chloride 90 meq/Magnesium Sulfate 10 meq/ Calcium Gluconate 20 meq/ 

Multivitamins 10 ml/Chromium/ Copper/Manganese/ Seleni/Zn 0.5 ml/ Total 

Parenteral Nutrition/Amino Acids/Dextrose/ Fat Emulsion Intravenous 1,512 ml @  

63 mls/hr TPN  CONT IV  Last administered on 3/19/20at 22:25;  Start 3/19/20 at 

22:00;  Stop 3/20/20 at 21:59;  Status DC


Sodium Chloride 1,000 ml @  1,000 mls/hr Q1H PRN IV hypotension;  Start 3/20/20 

at 08:05;  Stop 3/20/20 at 14:04;  Status DC


Albumin Human 200 ml @  200 mls/hr 1X  ONCE IV  Last administered on 3/20/20at 

08:57;  Start 3/20/20 at 08:15;  Stop 3/20/20 at 09:14;  Status DC


Diphenhydramine HCl (Benadryl) 25 mg 1X PRN  PRN IV ITCHING;  Start 3/20/20 at 

08:15;  Stop 3/21/20 at 08:14;  Status DC


Diphenhydramine HCl (Benadryl) 25 mg 1X PRN  PRN IV ITCHING;  Start 3/20/20 at 

08:15;  Stop 3/21/20 at 08:14;  Status DC


Sodium Chloride 1,000 ml @  400 mls/hr Q2H30M PRN IV PATENCY;  Start 3/20/20 at 

08:05;  Stop 3/20/20 at 20:04;  Status DC


Info (PHARMACY MONITORING -- do not chart) 1 each PRN DAILY  PRN MC SEE 

COMMENTS;  Start 3/20/20 at 08:15;  Stop 3/24/20 at 07:57;  Status DC


Sodium Chloride 90 meq/Potassium Chloride 15 meq/ Potassium Phosphate 10 mmol/ 

Magnesium Sulfate 10 meq/Calcium Gluconate 20 meq/ Multivitamins 10 ml/Chromium/

Copper/Manganese/ Seleni/Zn 0.5 ml/ Total Parenteral Nutrition/Amino 

Acids/Dextrose/ Fat Emulsion Intravenous 1,512 ml @  63 mls/hr TPN  CONT IV  

Last administered on 3/20/20at 21:01;  Start 3/20/20 at 22:00;  Stop 3/21/20 at 

21:59;  Status DC


Potassium Chloride/Water 100 ml @  100 mls/hr 1X  ONCE IV  Last administered on 

3/20/20at 14:09;  Start 3/20/20 at 14:00;  Stop 3/20/20 at 14:59;  Status DC


Benzocaine (Hurricaine One) 1 spray 1X  ONCE MM  Last administered on 3/20/20at 

16:38;  Start 3/20/20 at 14:30;  Stop 3/20/20 at 14:31;  Status DC


Lidocaine HCl (Glydo (Lidocaine) Jelly) 1 thomas 1X  ONCE MM  Last administered on 

3/20/20at 16:38;  Start 3/20/20 at 14:30;  Stop 3/20/20 at 14:31;  Status DC


Linezolid/Dextrose 300 ml @  300 mls/hr Q12HR IV  Last administered on 3/26/20at

21:04;  Start 3/20/20 at 20:00;  Stop 3/27/20 at 07:50;  Status DC


Acetaminophen (Tylenol) 650 mg PRN Q6HRS  PRN PO MILD PAIN / TEMP;  Start 

3/21/20 at 03:30;  Stop 3/21/20 at 03:36;  Status DC


Acetaminophen (Tylenol) 650 mg PRN Q6HRS  PRN PEG MILD PAIN / TEMP Last 

administered on 4/16/20at 19:56;  Start 3/21/20 at 03:36;  Stop 5/13/20 at 

10:25;  Status DC


Sodium Chloride 1,000 ml @  1,000 mls/hr Q1H PRN IV hypotension;  Start 3/21/20 

at 07:50;  Stop 3/21/20 at 13:49;  Status DC


Albumin Human 200 ml @  200 mls/hr 1X PRN  PRN IV Hypotension;  Start 3/21/20 at

08:00;  Stop 3/21/20 at 13:59;  Status DC


Sodium Chloride (Normal Saline Flush) 10 ml 1X PRN  PRN IV AP catheter pack;  

Start 3/21/20 at 08:00;  Stop 3/22/20 at 07:59;  Status DC


Sodium Chloride (Normal Saline Flush) 10 ml 1X PRN  PRN IV  catheter pack;  

Start 3/21/20 at 08:00;  Stop 3/22/20 at 07:59;  Status DC


Sodium Chloride 1,000 ml @  400 mls/hr Q2H30M PRN IV PATENCY;  Start 3/21/20 at 

07:50;  Stop 3/21/20 at 19:49;  Status DC


Info (PHARMACY MONITORING -- do not chart) 1 each PRN DAILY  PRN MC SEE 

COMMENTS;  Start 3/21/20 at 08:00;  Status UNV


Info (PHARMACY MONITORING -- do not chart) 1 each PRN DAILY  PRN MC SEE 

COMMENTS;  Start 3/21/20 at 08:00;  Stop 3/23/20 at 08:25;  Status DC


Sodium Chloride 90 meq/Potassium Chloride 15 meq/ Potassium Phosphate 10 mmol/ 

Magnesium Sulfate 10 meq/Calcium Gluconate 20 meq/ Multivitamins 10 ml/Chromium/

Copper/Manganese/ Seleni/Zn 0.5 ml/ Total Parenteral Nutrition/Amino Acids/De

xtrose/ Fat Emulsion Intravenous 1,512 ml @  63 mls/hr TPN  CONT IV  Last 

administered on 3/21/20at 20:57;  Start 3/21/20 at 22:00;  Stop 3/22/20 at 

21:59;  Status DC


Sodium Chloride 90 meq/Potassium Chloride 15 meq/ Potassium Phosphate 15 mmol/ 

Magnesium Sulfate 10 meq/Calcium Gluconate 20 meq/ Multivitamins 10 ml/Chromium/

Copper/Manganese/ Seleni/Zn 0.5 ml/ Total Parenteral Nutrition/Amino 

Acids/Dextrose/ Fat Emulsion Intravenous 1,512 ml @  63 mls/hr TPN  CONT IV ;  

Start 3/22/20 at 22:00;  Stop 3/22/20 at 14:16;  Status DC


Sodium Chloride 90 meq/Potassium Chloride 15 meq/ Potassium Phosphate 15 mmol/ 

Magnesium Sulfate 10 meq/Calcium Gluconate 20 meq/ Multivitamins 10 ml/Chromium/

Copper/Manganese/ Seleni/Zn 0.5 ml/ Total Parenteral Nutrition/Amino Acids

/Dextrose/ Fat Emulsion Intravenous 1,200 ml @  50 mls/hr TPN  CONT IV ;  Start 

3/22/20 at 22:00;  Stop 3/22/20 at 14:17;  Status DC


Sodium Chloride 90 meq/Potassium Chloride 15 meq/ Potassium Phosphate 10 mmol/ 

Magnesium Sulfate 10 meq/Calcium Gluconate 20 meq/ Multivitamins 10 ml/Chromium/

Copper/Manganese/ Seleni/Zn 0.5 ml/ Total Parenteral Nutrition/Amino 

Acids/Dextrose/ Fat Emulsion Intravenous 1,200 ml @  50 mls/hr TPN  CONT IV  

Last administered on 3/22/20at 23:29;  Start 3/22/20 at 22:00;  Stop 3/23/20 at 

21:59;  Status DC


Sodium Chloride 1,000 ml @  1,000 mls/hr Q1H PRN IV hypotension;  Start 3/23/20 

at 07:28;  Stop 3/23/20 at 13:27;  Status DC


Albumin Human 200 ml @  200 mls/hr 1X  ONCE IV  Last administered on 3/23/20at 

08:51;  Start 3/23/20 at 07:30;  Stop 3/23/20 at 08:29;  Status DC


Diphenhydramine HCl (Benadryl) 25 mg 1X PRN  PRN IV ITCHING;  Start 3/23/20 at 

07:30;  Stop 3/24/20 at 07:29;  Status DC


Diphenhydramine HCl (Benadryl) 25 mg 1X PRN  PRN IV ITCHING;  Start 3/23/20 at 

07:30;  Stop 3/24/20 at 07:29;  Status DC


Sodium Chloride 1,000 ml @  400 mls/hr Q2H30M PRN IV PATENCY;  Start 3/23/20 at 

07:28;  Stop 3/23/20 at 19:27;  Status DC


Info (PHARMACY MONITORING -- do not chart) 1 each PRN DAILY  PRN MC SEE 

COMMENTS;  Start 3/23/20 at 07:30;  Stop 4/3/20 at 13:01;  Status DC


Metronidazole 100 ml @  100 mls/hr Q6HRS IV  Last administered on 4/8/20at 

06:26;  Start 3/23/20 at 08:30;  Stop 4/8/20 at 09:58;  Status DC


Micafungin Sodium 100 mg/Dextrose 100 ml @  100 mls/hr Q24H IV  Last 

administered on 4/30/20at 08:18;  Start 3/23/20 at 09:00;  Stop 4/30/20 at 

20:58;  Status DC


Propofol 0 ml @ As Directed STK-MED ONCE IV ;  Start 3/23/20 at 07:53;  Stop 

3/23/20 at 07:53;  Status DC


Etomidate (Amidate) 20 mg STK-MED ONCE IV ;  Start 3/23/20 at 07:53;  Stop 

3/23/20 at 07:54;  Status DC


Midazolam HCl (Versed) 5 mg STK-MED ONCE .ROUTE ;  Start 3/23/20 at 07:57;  Stop

3/23/20 at 07:57;  Status DC


Fentanyl Citrate 30 ml @ 0 mls/hr CONT  PRN IV SEE PROTOCOL Last administered on

4/17/20at 06:12;  Start 3/23/20 at 08:15;  Stop 4/17/20 at 09:19;  Status DC


Artificial Tears (Artificial Tears) 1 drop PRN Q1HR  PRN OU DRY EYE, 1st choice;

 Start 3/23/20 at 08:15;  Stop 4/29/20 at 05:31;  Status DC


Midazolam HCl 50 mg/Sodium Chloride 50 ml @ 0 mls/hr CONT  PRN IV SEE PROTOCOL 

Last administered on 3/26/20at 22:39;  Start 3/23/20 at 08:15;  Stop 3/28/20 at 

15:59;  Status DC


Etomidate (Amidate) 8 mg 1X  ONCE IV  Last administered on 3/23/20at 08:33;  

Start 3/23/20 at 08:30;  Stop 3/23/20 at 08:31;  Status DC


Succinylcholine Chloride (Anectine) 120 mg 1X  ONCE IV  Last administered on 

3/23/20at 08:34;  Start 3/23/20 at 08:30;  Stop 3/23/20 at 08:31;  Status DC


Midazolam HCl (Versed) 5 mg 1X  ONCE IV ;  Start 3/23/20 at 08:30;  Stop 3/23/20

at 08:31;  Status DC


Potassium Chloride 15 meq/ Bicarbonate Dialysis Soln w/ out KCl 5,007.5 ml  @ 

1,000 mls/ hr Q5H1M IV  Last administered on 3/24/20at 11:11;  Start 3/23/20 at 

12:00;  Stop 3/24/20 at 11:15;  Status DC


Potassium Chloride 15 meq/ Bicarbonate Dialysis Soln w/ out KCl 5,007.5 ml  @ 

1,000 mls/ hr Q5H1M IV  Last administered on 3/24/20at 11:12;  Start 3/23/20 at 

12:00;  Stop 3/24/20 at 11:17;  Status DC


Potassium Chloride 15 meq/ Bicarbonate Dialysis Soln w/ out KCl 5,007.5 ml  @ 

1,000 mls/ hr Q5H1M IV  Last administered on 3/24/20at 11:11;  Start 3/23/20 at 

12:00;  Stop 3/24/20 at 11:19;  Status DC


Sodium Chloride 90 meq/Potassium Chloride 15 meq/ Potassium Phosphate 10 mmol/ 

Magnesium Sulfate 10 meq/Calcium Gluconate 20 meq/ Multivitamins 10 ml/Chromium/

Copper/Manganese/ Seleni/Zn 0.5 ml/ Total Parenteral Nutrition/Amino 

Acids/Dextrose/ Fat Emulsion Intravenous 1,400 ml @  58.333 mls/ hr TPN  CONT IV

 Last administered on 3/23/20at 21:42;  Start 3/23/20 at 22:00;  Stop 3/24/20 at

21:59;  Status DC


Heparin Sodium (Porcine) (Heparin Sodium) 5,000 unit Q8HRS SQ  Last administered

on 3/28/20at 05:55;  Start 3/23/20 at 15:00;  Stop 3/28/20 at 13:28;  Status DC


Meropenem 500 mg/ Sodium Chloride 50 ml @  100 mls/hr Q6HRS IV  Last 

administered on 3/25/20at 06:00;  Start 3/24/20 at 09:00;  Stop 3/25/20 at 

07:29;  Status DC


Potassium Phosphate 20 mmol/ Sodium Chloride 106.6667 ml @  51.667 m... 1X  ONCE

IV  Last administered on 3/24/20at 11:22;  Start 3/24/20 at 10:15;  Stop 3/24/20

at 12:18;  Status DC


Acetaminophen (Tylenol Supp) 650 mg PRN Q6HRS  PRN [facility] MILD PAIN / TEMP > 100.3'F

Last administered on 5/5/20at 09:12;  Start 3/24/20 at 10:30


Potassium Chloride/Water 100 ml @  100 mls/hr Q1H IV  Last administered on 

3/24/20at 12:12;  Start 3/24/20 at 11:00;  Stop 3/24/20 at 12:59;  Status DC


Potassium Chloride 20 meq/ Bicarbonate Dialysis Soln w/ out KCl 5,010 ml @  

1,000 mls/hr Q5H1M IV  Last administered on 3/25/20at 08:48;  Start 3/24/20 at 

12:00;  Stop 3/25/20 at 13:03;  Status DC


Potassium Chloride 20 meq/ Bicarbonate Dialysis Soln w/ out KCl 5,010 ml @  

1,000 mls/hr Q5H1M IV  Last administered on 3/29/20at 14:52;  Start 3/24/20 at 

11:30;  Stop 3/29/20 at 19:59;  Status DC


Potassium Chloride 20 meq/ Bicarbonate Dialysis Soln w/ out KCl 5,010 ml @  

1,000 mls/hr Q5H1M IV  Last administered on 3/29/20at 14:53;  Start 3/24/20 at 

11:30;  Stop 3/29/20 at 19:59;  Status DC


Sodium Chloride 90 meq/Potassium Chloride 15 meq/ Potassium Phosphate 15 mmol/ 

Magnesium Sulfate 10 meq/Calcium Gluconate 15 meq/ Multivitamins 10 ml/Chromium/

Copper/Manganese/ Seleni/Zn 0.5 ml/ Total Parenteral Nutrition/Amino 

Acids/Dextrose/ Fat Emulsion Intravenous 1,400 ml @  58.333 mls/ hr TPN  CONT IV

 Last administered on 3/24/20at 22:17;  Start 3/24/20 at 22:00;  Stop 3/25/20 at

21:59;  Status DC


Cefepime HCl (Maxipime) 2 gm Q12HR IVP  Last administered on 4/7/20at 20:56;  

Start 3/25/20 at 09:00;  Stop 4/8/20 at 09:58;  Status DC


Daptomycin 500 mg/ Sodium Chloride 50 ml @  100 mls/hr Q48H IV  Last 

administered on 4/10/20at 09:57;  Start 3/25/20 at 08:30;  Stop 4/10/20 at 

10:07;  Status DC


Lidocaine HCl (Buffered Lidocaine 1%) 3 ml 1X  ONCE INJ  Last administered on 

3/25/20at 10:27;  Start 3/25/20 at 10:30;  Stop 3/25/20 at 10:31;  Status DC


Potassium Phosphate 20 mmol/ Sodium Chloride 106.6667 ml @  51.667 m... 1X  ONCE

IV  Last administered on 3/25/20at 12:51;  Start 3/25/20 at 13:00;  Stop 3/25/20

at 15:03;  Status DC


Sodium Chloride 90 meq/Potassium Chloride 15 meq/ Potassium Phosphate 18 mmol/ 

Magnesium Sulfate 8 meq/Calcium Gluconate 15 meq/ Multivitamins 10 ml/Chromium/ 

Copper/Manganese/ Seleni/Zn 0.5 ml/ Total Parenteral Nutrition/Amino 

Acids/Dextrose/ Fat Emulsion Intravenous 1,400 ml @  58.333 mls/ hr TPN  CONT IV

 Last administered on 3/25/20at 22:16;  Start 3/25/20 at 22:00;  Stop 3/26/20 at

21:59;  Status DC


Potassium Chloride 20 meq/ Bicarbonate Dialysis Soln w/ out KCl 5,010 ml @  

1,000 mls/hr Q5H1M IV  Last administered on 3/29/20at 14:54;  Start 3/25/20 at 

16:00;  Stop 3/29/20 at 19:59;  Status DC


Multi-Ingred Cream/Lotion/Oil/ Oint (Artificial Tears Eye Ointment) 1 thomas PRN 

Q1HR  PRN OU DRY EYE, 2nd choice Last administered on 4/13/20at 08:19;  Start 

3/25/20 at 17:30


Sodium Chloride 90 meq/Potassium Chloride 15 meq/ Potassium Phosphate 18 mmol/ 

Magnesium Sulfate 8 meq/Calcium Gluconate 15 meq/ Multivitamins 10 ml/Chromium/ 

Copper/Manganese/ Seleni/Zn 0.5 ml/ Total Parenteral Nutrition/Amino 

Acids/Dextrose/ Fat Emulsion Intravenous 1,400 ml @  58.333 mls/ hr TPN  CONT IV

 Last administered on 3/26/20at 22:00;  Start 3/26/20 at 22:00;  Stop 3/27/20 at

21:59;  Status DC


Albumin Human 500 ml @  125 mls/hr 1X  ONCE IV ;  Start 3/26/20 at 14:15;  Stop 

3/26/20 at 18:14;  Status DC


Sodium Chloride 90 meq/Potassium Chloride 15 meq/ Potassium Phosphate 18 mmol/ 

Magnesium Sulfate 8 meq/Calcium Gluconate 15 meq/ Multivitamins 10 ml/Chromium/ 

Copper/Manganese/ Seleni/Zn 0.5 ml/ Insulin Human Regular 10 unit/ Total 

Parenteral Nutrition/Amino Acids/Dextrose/ Fat Emulsion Intravenous 1,400 ml @  

58.333 mls/ hr TPN  CONT IV  Last administered on 3/27/20at 21:43;  Start 3/27/2

0 at 22:00;  Stop 3/28/20 at 21:59;  Status DC


Lidocaine HCl (Buffered Lidocaine 1%) 3 ml STK-MED ONCE .ROUTE ;  Start 3/25/20 

at 10:00;  Stop 3/27/20 at 13:57;  Status DC


Midazolam HCl 100 mg/Sodium Chloride 100 ml @ 7 mls/hr CONT  PRN IV SEE PROTOCOL

Last administered on 4/8/20at 15:35;  Start 3/28/20 at 16:00


Sodium Chloride 90 meq/Potassium Chloride 15 meq/ Potassium Phosphate 18 mmol/ 

Magnesium Sulfate 8 meq/Calcium Gluconate 15 meq/ Multivitamins 10 ml/Chromium/ 

Copper/Manganese/ Seleni/Zn 0.5 ml/ Insulin Human Regular 15 unit/ Total 

Parenteral Nutrition/Amino Acids/Dextrose/ Fat Emulsion Intravenous 1,400 ml @  

58.333 mls/ hr TPN  CONT IV  Last administered on 3/28/20at 20:34;  Start 

3/28/20 at 22:00;  Stop 3/29/20 at 21:59;  Status DC


Info ([facility] Electrolyte Protocol) 1 ea CONT PRN  PRN MC PER PROTOCOL;  Start 

3/29/20 at 13:15


Sodium Chloride 90 meq/Potassium Chloride 15 meq/ Potassium Phosphate 18 mmol/ 

Magnesium Sulfate 8 meq/Calcium Gluconate 15 meq/ Multivitamins 10 ml/Chromium/ 

Copper/Manganese/ Seleni/Zn 0.5 ml/ Insulin Human Regular 15 unit/ Total 

Parenteral Nutrition/Amino Acids/Dextrose/ Fat Emulsion Intravenous 1,400 ml @  

58.333 mls/ hr TPN  CONT IV  Last administered on 3/29/20at 22:05;  Start 

3/29/20 at 22:00;  Stop 3/30/20 at 21:59;  Status DC


Potassium Chloride 15 meq/ Bicarbonate Dialysis Soln w/ out KCl 5,007.5 ml  @ 

1,000 mls/ hr Q5H1M IV  Last administered on 4/1/20at 18:14;  Start 3/29/20 at 

20:00;  Stop 4/2/20 at 13:08;  Status DC


Potassium Chloride 15 meq/ Bicarbonate Dialysis Soln w/ out KCl 5,007.5 ml  @ 

1,000 mls/ hr Q5H1M IV  Last administered on 4/1/20at 18:14;  Start 3/29/20 at 

20:00;  Stop 4/2/20 at 13:08;  Status DC


Potassium Chloride 15 meq/ Bicarbonate Dialysis Soln w/ out KCl 5,007.5 ml  @ 

1,000 mls/ hr Q5H1M IV  Last administered on 4/1/20at 18:14;  Start 3/29/20 at 

20:00;  Stop 4/2/20 at 13:08;  Status DC


Iohexol (Omnipaque 240 Mg/ml) 30 ml 1X  ONCE PO  Last administered on 3/30/20at 

11:30;  Start 3/30/20 at 11:30;  Stop 3/30/20 at 11:33;  Status DC


Info (CONTRAST GIVEN -- Rx MONITORING) 1 each PRN DAILY  PRN MC SEE COMMENTS;  

Start 3/30/20 at 11:45;  Stop 4/1/20 at 11:44;  Status DC


Sodium Chloride 90 meq/Potassium Chloride 15 meq/ Potassium Phosphate 18 mmol/ 

Magnesium Sulfate 8 meq/Calcium Gluconate 15 meq/ Multivitamins 10 ml/Chromium/ 

Copper/Manganese/ Seleni/Zn 0.5 ml/ Insulin Human Regular 15 unit/ Total 

Parenteral Nutrition/Amino Acids/Dextrose/ Fat Emulsion Intravenous 1,400 ml @  

58.333 mls/ hr TPN  CONT IV  Last administered on 3/30/20at 21:47;  Start 

3/30/20 at 22:00;  Stop 3/31/20 at 21:59;  Status DC


Sodium Chloride 90 meq/Potassium Chloride 15 meq/ Potassium Phosphate 18 mmol/ 

Magnesium Sulfate 8 meq/Calcium Gluconate 15 meq/ Multivitamins 10 ml/Chromium/ 

Copper/Manganese/ Seleni/Zn 0.5 ml/ Insulin Human Regular 20 unit/ Total 

Parenteral Nutrition/Amino Acids/Dextrose/ Fat Emulsion Intravenous 1,400 ml @  

58.333 mls/ hr TPN  CONT IV  Last administered on 3/31/20at 21:36;  Start 

3/31/20 at 22:00;  Stop 4/1/20 at 21:59;  Status DC


Alteplase, Recombinant (Cathflo For Central Catheter Clearance) 1 mg 1X  ONCE 

INT CAT  Last administered on 3/31/20at 20:03;  Start 3/31/20 at 19:30;  Stop 

3/31/20 at 19:46;  Status DC


Alteplase, Recombinant (Cathflo For Central Catheter Clearance) 1 mg 1X  ONCE 

INT CAT  Last administered on 3/31/20at 22:05;  Start 3/31/20 at 22:00;  Stop 

3/31/20 at 22:01;  Status DC


Sodium Chloride 90 meq/Potassium Chloride 15 meq/ Potassium Phosphate 18 mmol/ 

Magnesium Sulfate 8 meq/Calcium Gluconate 15 meq/ Multivitamins 10 ml/Chromium/ 

Copper/Manganese/ Seleni/Zn 0.5 ml/ Insulin Human Regular 20 unit/ Total 

Parenteral Nutrition/Amino Acids/Dextrose/ Fat Emulsion Intravenous 1,400 ml @  

58.333 mls/ hr TPN  CONT IV  Last administered on 4/1/20at 21:30;  Start 4/1/20 

at 22:00;  Stop 4/2/20 at 21:59;  Status DC


Dexmedetomidine HCl 400 mcg/ Sodium Chloride 100 ml @ 0 mls/hr CONT  PRN IV 

ANXIETY / AGITATION Last administered on 5/20/20at 05:00;  Start 4/2/20 at 08:15


Sodium Chloride 500 ml @  500 mls/hr 1X PRN  PRN IV ELEVATED BP, SEE COMMENTS;  

Start 4/2/20 at 08:15


Atropine Sulfate (ATROPINE 0.5mg SYRINGE) 0.5 mg PRN Q5MIN  PRN IV SEE COMMENTS;

 Start 4/2/20 at 08:15


Furosemide (Lasix) 20 mg 1X  ONCE IVP  Last administered on 4/2/20at 08:19;  

Start 4/2/20 at 08:15;  Stop 4/2/20 at 08:16;  Status DC


Lidocaine HCl (Buffered Lidocaine 1%) 3 ml STK-MED ONCE .ROUTE ;  Start 4/2/20 

at 08:39;  Stop 4/2/20 at 08:39;  Status DC


Lidocaine HCl (Buffered Lidocaine 1%) 6 ml 1X  ONCE INJ  Last administered on 

4/2/20at 09:05;  Start 4/2/20 at 09:00;  Stop 4/2/20 at 09:06;  Status DC


Sodium Chloride 90 meq/Potassium Chloride 15 meq/ Potassium Phosphate 18 mmol/ 

Magnesium Sulfate 8 meq/Calcium Gluconate 15 meq/ Multivitamins 10 ml/Chromium/ 

Copper/Manganese/ Seleni/Zn 0.5 ml/ Insulin Human Regular 20 unit/ Total 

Parenteral Nutrition/Amino Acids/Dextrose/ Fat Emulsion Intravenous 1,400 ml @  

58.333 mls/ hr TPN  CONT IV  Last administered on 4/2/20at 22:45;  Start 4/2/20 

at 22:00;  Stop 4/3/20 at 21:59;  Status DC


Sodium Chloride 1,000 ml @  1,000 mls/hr Q1H PRN IV hypotension;  Start 4/3/20 

at 07:30;  Stop 4/3/20 at 13:29;  Status DC


Albumin Human 200 ml @  200 mls/hr 1X PRN  PRN IV Hypotension Last administered 

on 4/3/20at 09:36;  Start 4/3/20 at 07:30;  Stop 4/3/20 at 13:29;  Status DC


Sodium Chloride (Normal Saline Flush) 10 ml 1X PRN  PRN IV AP catheter pack;  

Start 4/3/20 at 07:30;  Stop 4/3/20 at 21:29;  Status DC


Sodium Chloride (Normal Saline Flush) 10 ml 1X PRN  PRN IV  catheter pack;  

Start 4/3/20 at 07:30;  Stop 4/4/20 at 07:29;  Status DC


Sodium Chloride 1,000 ml @  400 mls/hr Q2H30M PRN IV PATENCY;  Start 4/3/20 at 

07:30;  Stop 4/3/20 at 19:29;  Status DC


Info (PHARMACY MONITORING -- do not chart) 1 each PRN DAILY  PRN MC SEE 

COMMENTS;  Start 4/3/20 at 07:30;  Stop 4/3/20 at 13:02;  Status DC


Info (PHARMACY MONITORING -- do not chart) 1 each PRN DAILY  PRN MC SEE 

COMMENTS;  Start 4/3/20 at 07:30;  Stop 4/5/20 at 12:45;  Status DC


Sodium Chloride 90 meq/Potassium Chloride 15 meq/ Potassium Phosphate 10 mmol/ 

Magnesium Sulfate 8 meq/Calcium Gluconate 15 meq/ Multivitamins 10 ml/Chromium/ 

Copper/Manganese/ Seleni/Zn 0.5 ml/ Insulin Human Regular 25 unit/ Total 

Parenteral Nutrition/Amino Acids/Dextrose/ Fat Emulsion Intravenous 1,400 ml @  

58.333 mls/ hr TPN  CONT IV  Last administered on 4/3/20at 22:19;  Start 4/3/20 

at 22:00;  Stop 4/4/20 at 21:59;  Status DC


Heparin Sodium (Porcine) (Heparin Sodium) 5,000 unit Q12HR SQ  Last administered

on 4/26/20at 08:59;  Start 4/3/20 at 21:00;  Stop 4/26/20 at 10:05;  Status DC


Ondansetron HCl (Zofran) 4 mg PRN Q6HRS  PRN IV NAUSEA/VOMITING;  Start 4/6/20 

at 07:00;  Stop 4/7/20 at 06:59;  Status DC


Fentanyl Citrate (Fentanyl 2ml Vial) 25 mcg PRN Q5MIN  PRN IV MILD PAIN 1-3;  

Start 4/6/20 at 07:00;  Stop 4/7/20 at 06:59;  Status DC


Fentanyl Citrate (Fentanyl 2ml Vial) 50 mcg PRN Q5MIN  PRN IV MODERATE TO SEVERE

PAIN;  Start 4/6/20 at 07:00;  Stop 4/7/20 at 06:59;  Status DC


Ringer's Solution 1,000 ml @  30 mls/hr Q24H IV ;  Start 4/6/20 at 07:00;  Stop 

4/6/20 at 18:59;  Status DC


Lidocaine HCl (Xylocaine-Mpf 1% 2ml Vial) 2 ml PRN 1X  PRN ID PRIOR TO IV START;

 Start 4/6/20 at 07:00;  Stop 4/7/20 at 06:59;  Status DC


Prochlorperazine Edisylate (Compazine) 5 mg PACU PRN  PRN IV NAUSEA, MRX1;  

Start 4/6/20 at 07:00;  Stop 4/7/20 at 06:59;  Status DC


Sodium Chloride 1,000 ml @  1,000 mls/hr Q1H PRN IV hypotension;  Start 4/4/20 

at 09:10;  Stop 4/4/20 at 15:09;  Status DC


Albumin Human 200 ml @  200 mls/hr 1X PRN  PRN IV Hypotension Last administered 

on 4/4/20at 10:10;  Start 4/4/20 at 09:15;  Stop 4/4/20 at 15:14;  Status DC


Sodium Chloride 1,000 ml @  400 mls/hr Q2H30M PRN IV PATENCY;  Start 4/4/20 at 

09:10;  Stop 4/4/20 at 21:09;  Status DC


Info (PHARMACY MONITORING -- do not chart) 1 each PRN DAILY  PRN MC SEE 

COMMENTS;  Start 4/4/20 at 09:15;  Stop 4/5/20 at 12:45;  Status DC


Info (PHARMACY MONITORING -- do not chart) 1 each PRN DAILY  PRN MC SEE 

COMMENTS;  Start 4/4/20 at 09:15;  Stop 4/5/20 at 12:45;  Status DC


Sodium Chloride 90 meq/Potassium Chloride 15 meq/ Potassium Phosphate 10 mmol/ 

Magnesium Sulfate 8 meq/Calcium Gluconate 15 meq/ Multivitamins 10 ml/Chromium/ 

Copper/Manganese/ Seleni/Zn 0.5 ml/ Insulin Human Regular 25 unit/ Total 

Parenteral Nutrition/Amino Acids/Dextrose/ Fat Emulsion Intravenous 1,400 ml @  

58.333 mls/ hr TPN  CONT IV  Last administered on 4/4/20at 22:10;  Start 4/4/20 

at 22:00;  Stop 4/5/20 at 21:59;  Status DC


Magnesium Sulfate 50 ml @ 25 mls/hr PRN DAILY  PRN IV for Mag < 1.7 on am labs 

Last administered on 4/20/20at 17:27;  Start 4/5/20 at 09:15


Sodium Chloride 90 meq/Potassium Chloride 15 meq/ Potassium Phosphate 10 mmol/ 

Magnesium Sulfate 8 meq/Calcium Gluconate 15 meq/ Multivitamins 10 ml/Chromium/ 

Copper/Manganese/ Seleni/Zn 0.5 ml/ Insulin Human Regular 25 unit/ Total 

Parenteral Nutrition/Amino Acids/Dextrose/ Fat Emulsion Intravenous 1,400 ml @  

58.333 mls/ hr TPN  CONT IV  Last administered on 4/5/20at 21:20;  Start 4/5/20 

at 22:00;  Stop 4/6/20 at 21:59;  Status DC


Sodium Chloride 1,000 ml @  1,000 mls/hr Q1H PRN IV hypotension;  Start 4/5/20 

at 12:23;  Stop 4/5/20 at 18:22;  Status DC


Albumin Human 200 ml @  200 mls/hr 1X  ONCE IV  Last administered on 4/5/20at 

13:34;  Start 4/5/20 at 12:30;  Stop 4/5/20 at 13:29;  Status DC


Diphenhydramine HCl (Benadryl) 25 mg 1X PRN  PRN IV ITCHING;  Start 4/5/20 at 

12:30;  Stop 4/6/20 at 12:29;  Status DC


Diphenhydramine HCl (Benadryl) 25 mg 1X PRN  PRN IV ITCHING;  Start 4/5/20 at 

12:30;  Stop 4/6/20 at 12:29;  Status DC


Info (PHARMACY MONITORING -- do not chart) 1 each PRN DAILY  PRN MC SEE 

COMMENTS;  Start 4/5/20 at 12:30;  Status Cancel


Bupivacaine HCl/ Epinephrine Bitart (Sensorcain-Epi 0.5%-1:[id number] Mpf) 30 ml 

STK-MED ONCE .ROUTE  Last administered on 4/6/20at 11:44;  Start 4/6/20 at 11:00

;  Stop 4/6/20 at 11:01;  Status DC


Cellulose (Surgicel Fibrillar 1x2) 1 each STK-MED ONCE .ROUTE ;  Start 4/6/20 at

11:00;  Stop 4/6/20 at 11:01;  Status DC


Sodium Chloride 90 meq/Potassium Chloride 15 meq/ Potassium Phosphate 10 mmol/ 

Magnesium Sulfate 12 meq/Calcium Gluconate 15 meq/ Multivitamins 10 ml/Chromium/

Copper/Manganese/ Seleni/Zn 0.5 ml/ Insulin Human Regular 25 unit/ Total 

Parenteral Nutrition/Amino Acids/Dextrose/ Fat Emulsion Intravenous 1,400 ml @  

58.333 mls/ hr TPN  CONT IV  Last administered on 4/6/20at 22:24;  Start 4/6/20 

at 22:00;  Stop 4/7/20 at 21:59;  Status DC


Propofol 20 ml @ As Directed STK-MED ONCE IV ;  Start 4/6/20 at 11:07;  Stop 

4/6/20 at 11:07;  Status DC


Cellulose (Surgicel Hemostat 4x8) 1 each STK-MED ONCE .ROUTE  Last administered 

on 4/6/20at 11:44;  Start 4/6/20 at 11:55;  Stop 4/6/20 at 11:56;  Status DC


Sevoflurane (Ultane) 60 ml STK-MED ONCE IH ;  Start 4/6/20 at 12:46;  Stop 

4/6/20 at 12:46;  Status DC


Sodium Chloride 1,000 ml @  1,000 mls/hr Q1H PRN IV hypotension;  Start 4/6/20 

at 13:51;  Stop 4/6/20 at 19:50;  Status DC


Albumin Human 200 ml @  200 mls/hr 1X PRN  PRN IV Hypotension Last administered 

on 4/6/20at 14:51;  Start 4/6/20 at 14:00;  Stop 4/6/20 at 19:59;  Status DC


Diphenhydramine HCl (Benadryl) 25 mg 1X PRN  PRN IV ITCHING;  Start 4/6/20 at 

14:00;  Stop 4/7/20 at 13:59;  Status DC


Diphenhydramine HCl (Benadryl) 25 mg 1X PRN  PRN IV ITCHING;  Start 4/6/20 at 

14:00;  Stop 4/7/20 at 13:59;  Status DC


Sodium Chloride 1,000 ml @  400 mls/hr Q2H30M PRN IV PATENCY;  Start 4/6/20 at 

13:51;  Stop 4/7/20 at 01:50;  Status DC


Info (PHARMACY MONITORING -- do not chart) 1 each PRN DAILY  PRN MC SEE 

COMMENTS;  Start 4/6/20 at 14:00;  Stop 4/9/20 at 08:16;  Status DC


Heparin Sodium (Porcine) (Hep Lock Adult) 500 unit STK-MED ONCE IVP ;  Start 

4/7/20 at 09:29;  Stop 4/7/20 at 09:30;  Status DC


Sodium Chloride 1,000 ml @  1,000 mls/hr Q1H PRN IV hypotension;  Start 4/7/20 

at 10:43;  Stop 4/7/20 at 16:42;  Status DC


Sodium Chloride 1,000 ml @  400 mls/hr Q2H30M PRN IV PATENCY;  Start 4/7/20 at 

10:43;  Stop 4/7/20 at 22:42;  Status DC


Info (PHARMACY MONITORING -- do not chart) 1 each PRN DAILY  PRN MC SEE 

COMMENTS;  Start 4/7/20 at 10:45;  Status UNV


Info (PHARMACY MONITORING -- do not chart) 1 each PRN DAILY  PRN MC SEE VITO HUTCHISON;  Start 4/7/20 at 10:45;  Status UNV


Sodium Chloride 90 meq/Potassium Chloride 15 meq/ Magnesium Sulfate 12 

meq/Calcium Gluconate 15 meq/ Multivitamins 10 ml/Chromium/ Copper/Manganese/ 

Seleni/Zn 0.5 ml/ Insulin Human Regular 25 unit/ Total Parenteral 

Nutrition/Amino Acids/Dextrose/ Fat Emulsion Intravenous 1,400 ml @  58.333 mls/

hr TPN  CONT IV  Last administered on 4/7/20at 22:13;  Start 4/7/20 at 22:00;  

Stop 4/8/20 at 21:59;  Status DC


Sodium Chloride 1,000 ml @  1,000 mls/hr Q1H PRN IV hypotension;  Start 4/8/20 

at 07:50;  Stop 4/8/20 at 13:49;  Status DC


Albumin Human 200 ml @  200 mls/hr 1X  ONCE IV ;  Start 4/8/20 at 08:00;  Stop 

4/8/20 at 08:53;  Status DC


Diphenhydramine HCl (Benadryl) 25 mg 1X PRN  PRN IV ITCHING;  Start 4/8/20 at 

08:00;  Stop 4/9/20 at 07:59;  Status DC


Diphenhydramine HCl (Benadryl) 25 mg 1X PRN  PRN IV ITCHING;  Start 4/8/20 at 

08:00;  Stop 4/9/20 at 07:59;  Status DC


Info (PHARMACY MONITORING -- do not chart) 1 each PRN DAILY  PRN MC SEE 

COMMENTS;  Start 4/8/20 at 08:00;  Stop 4/9/20 at 08:16;  Status DC


Albumin Human 50 ml @ 50 mls/hr 1X  ONCE IV ;  Start 4/8/20 at 08:53;  Stop 

4/8/20 at 08:56;  Status DC


Albumin Human 200 ml @  50 mls/hr PRN 1X  PRN IV HYPOTENSION Last administered 

on 4/14/20at 11:54;  Start 4/8/20 at 09:00


Meropenem 500 mg/ Sodium Chloride 50 ml @  100 mls/hr Q12H IV  Last administered

on 4/28/20at 10:45;  Start 4/8/20 at 10:00;  Stop 4/28/20 at 12:37;  Status DC


Sodium Chloride 90 meq/Magnesium Sulfate 12 meq/ Calcium Gluconate 15 meq/ 

Multivitamins 10 ml/Chromium/ Copper/Manganese/ Seleni/Zn 0.5 ml/ Insulin Human 

Regular 25 unit/ Total Parenteral Nutrition/Amino Acids/Dextrose/ Fat Emulsion 

Intravenous 1,400 ml @  58.333 mls/ hr TPN  CONT IV  Last administered on 

4/8/20at 21:41;  Start 4/8/20 at 22:00;  Stop 4/9/20 at 21:59;  Status DC


Sodium Chloride 1,000 ml @  1,000 mls/hr Q1H PRN IV hypotension;  Start 4/9/20 

at 07:58;  Stop 4/9/20 at 13:57;  Status DC


Albumin Human 200 ml @  200 mls/hr 1X PRN  PRN IV Hypotension Last administered 

on 4/9/20at 09:30;  Start 4/9/20 at 08:00;  Stop 4/9/20 at 13:59;  Status DC


Sodium Chloride 1,000 ml @  400 mls/hr Q2H30M PRN IV PATENCY;  Start 4/9/20 at 

07:58;  Stop 4/9/20 at 19:57;  Status DC


Info (PHARMACY MONITORING -- do not chart) 1 each PRN DAILY  PRN MC SEE COMMENT

S;  Start 4/9/20 at 08:00;  Status Cancel


Info (PHARMACY MONITORING -- do not chart) 1 each PRN DAILY  PRN MC SEE 

COMMENTS;  Start 4/9/20 at 08:15;  Status UNV


Sodium Chloride 90 meq/Potassium Phosphate 5 mmol/ Magnesium Sulfate 12 

meq/Calcium Gluconate 15 meq/ Multivitamins 10 ml/Chromium/ Copper/Manganese/ 

Seleni/Zn 0.5 ml/ Insulin Human Regular 30 unit/ Total Parenteral Nutritio

n/Amino Acids/Dextrose/ Fat Emulsion Intravenous 1,400 ml @  58.333 mls/ hr TPN 

CONT IV  Last administered on 4/9/20at 22:08;  Start 4/9/20 at 22:00;  Stop 

4/10/20 at 21:59;  Status DC


Linezolid/Dextrose 300 ml @  300 mls/hr Q12HR IV  Last administered on 4/20/20at

20:40;  Start 4/10/20 at 11:00;  Stop 4/21/20 at 08:10;  Status DC


Sodium Chloride 90 meq/Potassium Phosphate 15 mmol/ Magnesium Sulfate 12 

meq/Calcium Gluconate 15 meq/ Multivitamins 10 ml/Chromium/ Copper/Manganese/ 

Seleni/Zn 0.5 ml/ Insulin Human Regular 30 unit/ Total Parenteral 

Nutrition/Amino Acids/Dextrose/ Fat Emulsion Intravenous 1,400 ml @  58.333 mls/

hr TPN  CONT IV  Last administered on 4/10/20at 21:49;  Start 4/10/20 at 22:00; 

Stop 4/11/20 at 21:59;  Status DC


Sodium Chloride 90 meq/Potassium Phosphate 15 mmol/ Magnesium Sulfate 12 

meq/Calcium Gluconate 15 meq/ Multivitamins 10 ml/Chromium/ Copper/Manganese/ 

Seleni/Zn 0.5 ml/ Insulin Human Regular 40 unit/ Total Parenteral 

Nutrition/Amino Acids/Dextrose/ Fat Emulsion Intravenous 1,400 ml @  58.333 mls/

hr TPN  CONT IV  Last administered on 4/11/20at 21:21;  Start 4/11/20 at 22:00; 

Stop 4/12/20 at 21:59;  Status DC


Sodium Chloride 1,000 ml @  1,000 mls/hr Q1H PRN IV hypotension;  Start 4/11/20 

at 13:26;  Stop 4/11/20 at 19:25;  Status DC


Albumin Human 200 ml @  200 mls/hr 1X PRN  PRN IV Hypotension Last administered 

on 4/11/20at 15:00;  Start 4/11/20 at 13:30;  Stop 4/11/20 at 19:29;  Status DC


Sodium Chloride (Normal Saline Flush) 10 ml 1X PRN  PRN IV AP catheter pack;  

Start 4/11/20 at 13:30;  Stop 4/12/20 at 13:29;  Status DC


Sodium Chloride (Normal Saline Flush) 10 ml 1X PRN  PRN IV  catheter pack;  

Start 4/11/20 at 13:30;  Stop 4/12/20 at 13:29;  Status DC


Sodium Chloride 1,000 ml @  400 mls/hr Q2H30M PRN IV PATENCY;  Start 4/11/20 at 

13:26;  Stop 4/12/20 at 01:25;  Status DC


Info (PHARMACY MONITORING -- do not chart) 1 each PRN DAILY  PRN MC SEE COMMENT

S;  Start 4/11/20 at 13:30;  Stop 4/11/20 at 13:33;  Status DC


Info (PHARMACY MONITORING -- do not chart) 1 each PRN DAILY  PRN MC SEE 

COMMENTS;  Start 4/11/20 at 13:30;  Stop 4/11/20 at 13:34;  Status DC


Sodium Chloride 90 meq/Potassium Phosphate 19 mmol/ Magnesium Sulfate 12 

meq/Calcium Gluconate 15 meq/ Multivitamins 10 ml/Chromium/ Copper/Manganese/ 

Seleni/Zn 0.5 ml/ Insulin Human Regular 40 unit/ Total Parenteral 

Nutrition/Amino Acids/Dextrose/ Fat Emulsion Intravenous 1,400 ml @  58.333 mls/

hr TPN  CONT IV  Last administered on 4/12/20at 21:54;  Start 4/12/20 at 22:00; 

Stop 4/13/20 at 21:59;  Status DC


Sodium Chloride 1,000 ml @  1,000 mls/hr Q1H PRN IV hypotension;  Start 4/13/20 

at 09:35;  Stop 4/13/20 at 15:34;  Status DC


Albumin Human 200 ml @  200 mls/hr 1X PRN  PRN IV Hypotension;  Start 4/13/20 at

09:45;  Stop 4/13/20 at 15:44;  Status DC


Diphenhydramine HCl (Benadryl) 25 mg 1X PRN  PRN IV ITCHING;  Start 4/13/20 at 

09:45;  Stop 4/14/20 at 09:44;  Status DC


Diphenhydramine HCl (Benadryl) 25 mg 1X PRN  PRN IV ITCHING;  Start 4/13/20 at 

09:45;  Stop 4/14/20 at 09:44;  Status DC


Sodium Chloride 1,000 ml @  400 mls/hr Q2H30M PRN IV PATENCY;  Start 4/13/20 at 

09:35;  Stop 4/13/20 at 21:34;  Status DC


Info (PHARMACY MONITORING -- do not chart) 1 each PRN DAILY  PRN MC SEE 

COMMENTS;  Start 4/13/20 at 09:45;  Status Cancel


Sodium Chloride 100 meq/Potassium Phosphate 19 mmol/ Magnesium Sulfate 12 

meq/Calcium Gluconate 15 meq/ Multivitamins 10 ml/Chromium/ Copper/Manganese/ 

Seleni/Zn 0.5 ml/ Insulin Human Regular 40 unit/ Potassium Chloride 20 meq/ 

Total Parenteral Nutrition/Amino Acids/Dextrose/ Fat Emulsion Intravenous 1,400 

ml @  58.333 mls/ hr TPN  CONT IV  Last administered on 4/13/20at 22:02;  Start 

4/13/20 at 22:00;  Stop 4/14/20 at 21:59;  Status DC


Furosemide (Lasix) 40 mg 1X  ONCE IVP  Last administered on 4/13/20at 14:39;  

Start 4/13/20 at 14:30;  Stop 4/13/20 at 14:31;  Status DC


Metronidazole 100 ml @  100 mls/hr Q8HRS IV  Last administered on 4/21/20at 

06:04;  Start 4/14/20 at 10:00;  Stop 4/21/20 at 08:10;  Status DC


Sodium Chloride 1,000 ml @  1,000 mls/hr Q1H PRN IV hypotension;  Start 4/14/20 

at 08:00;  Stop 4/14/20 at 13:59;  Status DC


Albumin Human 200 ml @  200 mls/hr 1X PRN  PRN IV Hypotension;  Start 4/14/20 at

08:00;  Stop 4/14/20 at 13:59;  Status DC


Sodium Chloride 1,000 ml @  400 mls/hr Q2H30M PRN IV PATENCY;  Start 4/14/20 at 

08:00;  Stop 4/14/20 at 19:59;  Status DC


Info (PHARMACY MONITORING -- do not chart) 1 each PRN DAILY  PRN MC SEE 

COMMENTS;  Start 4/14/20 at 11:30;  Status UNV


Info (PHARMACY MONITORING -- do not chart) 1 each PRN DAILY  PRN MC SEE 

COMMENTS;  Start 4/14/20 at 11:30;  Stop 4/16/20 at 12:13;  Status DC


Sodium Chloride 100 meq/Potassium Phosphate 19 mmol/ Magnesium Sulfate 12 

meq/Calcium Gluconate 15 meq/ Multivitamins 10 ml/Chromium/ Copper/Manganese/ 

Seleni/Zn 0.5 ml/ Insulin Human Regular 40 unit/ Potassium Chloride 20 meq/ 

Total Parenteral Nutrition/Amino Acids/Dextrose/ Fat Emulsion Intravenous 1,400 

ml @  58.333 mls/ hr TPN  CONT IV  Last administered on 4/14/20at 21:52;  Start 

4/14/20 at 22:00;  Stop 4/15/20 at 21:59;  Status DC


Sodium Chloride (Normal Saline Flush) 10 ml QSHIFT  PRN IV AFTER MEDS AND BLOOD 

DRAWS;  Start 4/14/20 at 15:00;  Stop 5/12/20 at 11:27;  Status DC


Sodium Chloride (Normal Saline Flush) 10 ml PRN Q5MIN  PRN IV AFTER MEDS AND 

BLOOD DRAWS;  Start 4/14/20 at 15:00


Sodium Chloride (Normal Saline Flush) 20 ml PRN Q5MIN  PRN IV AFTER MEDS AND 

BLOOD DRAWS;  Start 4/14/20 at 15:00


Sodium Chloride 100 meq/Potassium Phosphate 19 mmol/ Magnesium Sulfate 12 

meq/Calcium Gluconate 15 meq/ Multivitamins 10 ml/Chromium/ Copper/Manganese/ 

Seleni/Zn 0.5 ml/ Insulin Human Regular 40 unit/ Potassium Chloride 20 meq/ 

Total Parenteral Nutrition/Amino Acids/Dextrose/ Fat Emulsion Intravenous 1,400 

ml @  58.333 mls/ hr TPN  CONT IV  Last administered on 4/15/20at 21:20;  Start 

4/15/20 at 22:00;  Stop 4/16/20 at 21:59;  Status DC


Lidocaine HCl (Buffered Lidocaine 1%) 3 ml STK-MED ONCE .ROUTE ;  Start 4/15/20 

at 13:16;  Stop 4/15/20 at 13:16;  Status DC


Lidocaine HCl (Buffered Lidocaine 1%) 6 ml 1X  ONCE INJ  Last administered on 

4/15/20at 13:45;  Start 4/15/20 at 13:30;  Stop 4/15/20 at 13:31;  Status DC


Albumin Human 100 ml @  100 mls/hr 1X  ONCE IV  Last administered on 4/15/20at 

15:41;  Start 4/15/20 at 15:00;  Stop 4/15/20 at 15:59;  Status DC


Albumin Human 50 ml @ 50 mls/hr 1X  ONCE IV  Last administered on 4/15/20at 

15:00;  Start 4/15/20 at 15:00;  Stop 4/15/20 at 15:59;  Status DC


Info (PHARMACY MONITORING -- do not chart) 1 each PRN DAILY  PRN MC SEE MIKEY

TS;  Start 4/16/20 at 11:30;  Status Cancel


Info (PHARMACY MONITORING -- do not chart) 1 each PRN DAILY  PRN MC SEE 

COMMENTS;  Start 4/16/20 at 11:30;  Status UNV


Sodium Chloride 100 meq/Potassium Phosphate 10 mmol/ Magnesium Sulfate 12 

meq/Calcium Gluconate 15 meq/ Multivitamins 10 ml/Chromium/ Copper/Manganese/ 

Seleni/Zn 0.5 ml/ Insulin Human Regular 35 unit/ Potassium Chloride 20 meq/ 

Total Parenteral Nutrition/Amino Acids/Dextrose/ Fat Emulsion Intravenous 1,400 

ml @  58.333 mls/ hr TPN  CONT IV  Last administered on 4/16/20at 22:10;  Start 

4/16/20 at 22:00;  Stop 4/17/20 at 21:59;  Status DC


Sodium Chloride 100 meq/Potassium Phosphate 5 mmol/ Magnesium Sulfate 12 

meq/Calcium Gluconate 15 meq/ Multivitamins 10 ml/Chromium/ Copper/Manganese/ 

Seleni/Zn 0.5 ml/ Insulin Human Regular 35 unit/ Potassium Chloride 20 meq/ 

Total Parenteral Nutrition/Amino Acids/Dextrose/ Fat Emulsion Intravenous 1,400 

ml @  58.333 mls/ hr TPN  CONT IV  Last administered on 4/17/20at 22:59;  Start 

4/17/20 at 22:00;  Stop 4/18/20 at 21:59;  Status DC


Sodium Chloride 1,000 ml @  1,000 mls/hr Q1H PRN IV hypotension;  Start 4/18/20 

at 08:27;  Stop 4/18/20 at 14:26;  Status DC


Albumin Human 200 ml @  200 mls/hr 1X PRN  PRN IV Hypotension Last administered 

on 4/18/20at 09:18;  Start 4/18/20 at 08:30;  Stop 4/18/20 at 14:29;  Status DC


Sodium Chloride 1,000 ml @  400 mls/hr Q2H30M PRN IV PATENCY;  Start 4/18/20 at 

08:27;  Stop 4/18/20 at 20:26;  Status DC


Info (PHARMACY MONITORING -- do not chart) 1 each PRN DAILY  PRN MC SEE 

COMMENTS;  Start 4/18/20 at 08:30;  Status Cancel


Info (PHARMACY MONITORING -- do not chart) 1 each PRN DAILY  PRN MC SEE 

COMMENTS;  Start 4/18/20 at 08:30;  Stop 4/26/20 at 13:10;  Status DC


Sodium Chloride 100 meq/Potassium Chloride 40 meq/ Magnesium Sulfate 15 meq/C

alcium Gluconate 15 meq/ Multivitamins 10 ml/Chromium/ Copper/Manganese/ 

Seleni/Zn 0.5 ml/ Insulin Human Regular 35 unit/ Total Parenteral 

Nutrition/Amino Acids/Dextrose/ Fat Emulsion Intravenous 1,400 ml @  58.333 mls/

hr TPN  CONT IV  Last administered on 4/18/20at 22:00;  Start 4/18/20 at 22:00; 

Stop 4/19/20 at 21:59;  Status DC


Potassium Chloride/Water 100 ml @  100 mls/hr 1X  ONCE IV  Last administered on 

4/18/20at 17:28;  Start 4/18/20 at 14:45;  Stop 4/18/20 at 15:44;  Status DC


Sodium Chloride 100 meq/Potassium Chloride 40 meq/ Magnesium Sulfate 15 

meq/Calcium Gluconate 15 meq/ Multivitamins 10 ml/Chromium/ Copper/Manganese/ 

Seleni/Zn 0.5 ml/ Insulin Human Regular 35 unit/ Total Parenteral 

Nutrition/Amino Acids/Dextrose/ Fat Emulsion Intravenous 1,400 ml @  58.333 mls/

hr TPN  CONT IV  Last administered on 4/19/20at 22:46;  Start 4/19/20 at 22:00; 

Stop 4/20/20 at 21:59;  Status DC


Sodium Chloride 100 meq/Potassium Chloride 40 meq/ Magnesium Sulfate 20 

meq/Calcium Gluconate 15 meq/ Multivitamins 10 ml/Chromium/ Copper/Manganese/ 

Seleni/Zn 0.5 ml/ Insulin Human Regular 35 unit/ Total Parenteral 

Nutrition/Amino Acids/Dextrose/ Fat Emulsion Intravenous 1,400 ml @  58.333 mls/

hr TPN  CONT IV  Last administered on 4/20/20at 22:31;  Start 4/20/20 at 22:00; 

Stop 4/21/20 at 21:59;  Status DC


Fentanyl Citrate (Fentanyl 2ml Vial) 50 mcg PRN Q2HR  PRN IVP PAIN Last 

administered on 4/27/20at 13:32;  Start 4/20/20 at 21:00;  Stop 4/28/20 at 

12:53;  Status DC


Fentanyl Citrate (Fentanyl 2ml Vial) 25 mcg PRN Q2HR  PRN IVP PAIN;  Start 

4/20/20 at 21:00;  Stop 4/28/20 at 12:54;  Status DC


Enoxaparin Sodium (Lovenox 100mg Syringe) 100 mg Q12HR SQ ;  Start 4/21/20 at 

21:00;  Status UNV


Amino Acids/ Glycerin/ Electrolytes 1,000 ml @  75 mls/hr [id number] IV ;  Start 

4/20/20 at 21:15;  Status UNV


Sodium Chloride 1,000 ml @  1,000 mls/hr Q1H PRN IV hypotension;  Start 4/21/20 

at 07:56;  Stop 4/21/20 at 13:55;  Status DC


Albumin Human 200 ml @  200 mls/hr 1X PRN  PRN IV Hypotension Last administered 

on 4/21/20at 08:40;  Start 4/21/20 at 08:00;  Stop 4/21/20 at 13:59;  Status DC


Sodium Chloride 1,000 ml @  400 mls/hr Q2H30M PRN IV PATENCY;  Start 4/21/20 at 

07:56;  Stop 4/21/20 at 19:55;  Status DC


Info (PHARMACY MONITORING -- do not chart) 1 each PRN DAILY  PRN MC SEE 

COMMENTS;  Start 4/21/20 at 08:00;  Status UNV


Info (PHARMACY MONITORING -- do not chart) 1 each PRN DAILY  PRN MC SEE 

COMMENTS;  Start 4/21/20 at 08:00;  Status UNV


Daptomycin 430 mg/ Sodium Chloride 50 ml @  100 mls/hr Q24H IV  Last 

administered on 4/21/20at 12:35;  Start 4/21/20 at 09:00;  Stop 4/21/20 at 

12:49;  Status DC


Sodium Chloride 100 meq/Potassium Chloride 40 meq/ Magnesium Sulfate 20 

meq/Calcium Gluconate 15 meq/ Multivitamins 10 ml/Chromium/ Copper/Manganese/ 

Seleni/Zn 0.5 ml/ Insulin Human Regular 35 unit/ Total Parenteral 

Nutrition/Amino Acids/Dextrose/ Fat Emulsion Intravenous 1,400 ml @  58.333 mls/

hr TPN  CONT IV  Last administered on 4/21/20at 21:26;  Start 4/21/20 at 22:00; 

Stop 4/22/20 at 21:59;  Status DC


Daptomycin 430 mg/ Sodium Chloride 50 ml @  100 mls/hr Q48H IV ;  Start 4/23/20 

at 09:00;  Stop 4/22/20 at 11:55;  Status DC


Sodium Chloride 100 meq/Potassium Chloride 40 meq/ Magnesium Sulfate 20 

meq/Calcium Gluconate 15 meq/ Multivitamins 10 ml/Chromium/ Copper/Manganese/ 

Seleni/Zn 0.5 ml/ Insulin Human Regular 35 unit/ Total Parenteral 

Nutrition/Amino Acids/Dextrose/ Fat Emulsion Intravenous 1,400 ml @  58.333 mls/

hr TPN  CONT IV  Last administered on 4/22/20at 22:27;  Start 4/22/20 at 22:00; 

Stop 4/23/20 at 21:59;  Status DC


Daptomycin 430 mg/ Sodium Chloride 50 ml @  100 mls/hr Q24H IV  Last 

administered on 4/24/20at 15:07;  Start 4/22/20 at 13:00;  Stop 4/25/20 at 

13:15;  Status DC


Sodium Chloride 100 meq/Potassium Chloride 40 meq/ Magnesium Sulfate 20 

meq/Calcium Gluconate 10 meq/ Multivitamins 10 ml/Chromium/ Copper/Manganese/ 

Seleni/Zn 0.5 ml/ Insulin Human Regular 35 unit/ Total Parenteral 

Nutrition/Amino Acids/Dextrose/ Fat Emulsion Intravenous 1,400 ml @  58.333 mls/

hr TPN  CONT IV  Last administered on 4/24/20at 00:06;  Start 4/23/20 at 22:00; 

Stop 4/24/20 at 21:59;  Status DC


Alteplase, Recombinant (Cathflo For Central Catheter Clearance) 1 mg 1X  ONCE IN

T CAT  Last administered on 4/24/20at 11:44;  Start 4/24/20 at 10:45;  Stop 

4/24/20 at 10:46;  Status DC


Ondansetron HCl (Zofran) 4 mg PRN Q6HRS  PRN IV NAUSEA/VOMITING;  Start 4/27/20 

at 07:00;  Stop 4/28/20 at 06:59;  Status DC


Fentanyl Citrate (Fentanyl 2ml Vial) 25 mcg PRN Q5MIN  PRN IV MILD PAIN 1-3;  

Start 4/27/20 at 07:00;  Stop 4/28/20 at 06:59;  Status DC


Fentanyl Citrate (Fentanyl 2ml Vial) 50 mcg PRN Q5MIN  PRN IV MODERATE TO SEVERE

PAIN Last administered on 4/27/20at 10:17;  Start 4/27/20 at 07:00;  Stop 

4/28/20 at 06:59;  Status DC


Ringer's Solution 1,000 ml @  30 mls/hr Q24H IV ;  Start 4/27/20 at 07:00;  Stop

4/27/20 at 18:59;  Status DC


Lidocaine HCl (Xylocaine-Mpf 1% 2ml Vial) 2 ml PRN 1X  PRN ID PRIOR TO IV START;

 Start 4/27/20 at 07:00;  Stop 4/28/20 at 06:59;  Status DC


Prochlorperazine Edisylate (Compazine) 5 mg PACU PRN  PRN IV NAUSEA, MRX1;  

Start 4/27/20 at 07:00;  Stop 4/28/20 at 06:59;  Status DC


Sodium Acetate 50 meq/Potassium Acetate 55 meq/ Magnesium Sulfate 20 meq/Calcium

Gluconate 10 meq/ Multivitamins 10 ml/Chromium/ Copper/Manganese/ Seleni/Zn 0.5 

ml/ Insulin Human Regular 35 unit/ Total Parenteral Nutrition/Amino 

Acids/Dextrose/ Fat Emulsion Intravenous 1,400 ml @  58.333 mls/ hr TPN  CONT IV

;  Start 4/24/20 at 22:00;  Stop 4/24/20 at 14:15;  Status DC


Sodium Acetate 50 meq/Potassium Acetate 55 meq/ Magnesium Sulfate 20 meq/Calcium

Gluconate 10 meq/ Multivitamins 10 ml/Chromium/ Copper/Manganese/ Seleni/Zn 0.5 

ml/ Insulin Human Regular 35 unit/ Total Parenteral Nutrition/Amino 

Acids/Dextrose/ Fat Emulsion Intravenous 1,800 ml @  75 mls/hr TPN  CONT IV  

Last administered on 4/24/20at 22:38;  Start 4/24/20 at 22:00;  Stop 4/25/20 at 

21:59;  Status DC


Sodium Chloride 1,000 ml @  1,000 mls/hr Q1H PRN IV hypotension;  Start 4/24/20 

at 15:31;  Stop 4/24/20 at 21:30;  Status DC


Diphenhydramine HCl (Benadryl) 25 mg 1X PRN  PRN IV ITCHING;  Start 4/24/20 at 

15:45;  Stop 4/25/20 at 15:44;  Status DC


Diphenhydramine HCl (Benadryl) 25 mg 1X PRN  PRN IV ITCHING;  Start 4/24/20 at 

15:45;  Stop 4/25/20 at 15:44;  Status DC


Sodium Chloride 1,000 ml @  400 mls/hr Q2H30M PRN IV PATENCY;  Start 4/24/20 at 

15:31;  Stop 4/25/20 at 03:30;  Status DC


Info (PHARMACY MONITORING -- do not chart) 1 each PRN DAILY  PRN MC SEE 

COMMENTS;  Start 4/24/20 at 15:45


Sodium Acetate 50 meq/Potassium Acetate 55 meq/ Magnesium Sulfate 20 meq/Calcium

Gluconate 10 meq/ Multivitamins 10 ml/Chromium/ Copper/Manganese/ Seleni/Zn 0.5 

ml/ Insulin Human Regular 35 unit/ Total Parenteral Nutrition/Amino 

Acids/Dextrose/ Fat Emulsion Intravenous 1,800 ml @  75 mls/hr TPN  CONT IV  

Last administered on 4/25/20at 22:03;  Start 4/25/20 at 22:00;  Stop 4/26/20 at 

21:59;  Status DC


Daptomycin 430 mg/ Sodium Chloride 50 ml @  100 mls/hr Q24H IV  Last 

administered on 4/30/20at 13:00;  Start 4/25/20 at 13:00;  Stop 4/30/20 at 

20:58;  Status DC


Heparin Sodium (Porcine) 1000 unit/Sodium Chloride 1,001 ml @  1,001 mls/hr 1X  

ONCE IRR ;  Start 4/27/20 at 06:00;  Stop 4/27/20 at 06:59;  Status DC


Potassium Acetate 55 meq/Magnesium Sulfate 20 meq/ Calcium Gluconate 10 meq/ 

Multivitamins 10 ml/Chromium/ Copper/Manganese/ Seleni/Zn 0.5 ml/ Insulin Human 

Regular 35 unit/ Total Parenteral Nutrition/Amino Acids/Dextrose/ Fat Emulsion 

Intravenous 1,920 ml @  80 mls/hr TPN  CONT IV  Last administered on 4/26/20at 

22:10;  Start 4/26/20 at 22:00;  Stop 4/27/20 at 21:59;  Status DC


Dexamethasone Sodium Phosphate (Decadron) 4 mg STK-MED ONCE .ROUTE ;  Start 

4/27/20 at 10:56;  Stop 4/27/20 at 10:57;  Status DC


Ondansetron HCl (Zofran) 4 mg STK-MED ONCE .ROUTE ;  Start 4/27/20 at 10:56;  

Stop 4/27/20 at 10:57;  Status DC


Rocuronium Bromide (Zemuron) 50 mg STK-MED ONCE .ROUTE ;  Start 4/27/20 at 

10:56;  Stop 4/27/20 at 10:57;  Status DC


Fentanyl Citrate (Fentanyl 2ml Vial) 100 mcg STK-MED ONCE .ROUTE ;  Start 4 /27/20 at 10:56;  Stop 4/27/20 at 10:57;  Status DC


Bupivacaine HCl/ Epinephrine Bitart (Sensorcain-Epi 0.5%-1:[id number] Mpf) 30 ml 

STK-MED ONCE .ROUTE  Last administered on 4/27/20at 12:01;  Start 4/27/20 at 

10:58;  Stop 4/27/20 at 10:58;  Status DC


Cellulose (Surgicel Hemostat 2x14) 1 each STK-MED ONCE .ROUTE ;  Start 4/27/20 

at 10:58;  Stop 4/27/20 at 10:59;  Status DC


Iohexol (Omnipaque 300 Mg/ml) 50 ml STK-MED ONCE .ROUTE ;  Start 4/27/20 at 

10:58;  Stop 4/27/20 at 10:59;  Status DC


Cellulose (Surgicel Hemostat 4x8) 1 each STK-MED ONCE .ROUTE ;  Start 4/27/20 at

10:58;  Stop 4/27/20 at 10:59;  Status DC


Bisacodyl (Dulcolax Supp) 10 mg STK-MED ONCE .ROUTE ;  Start 4/27/20 at 10:59;  

Stop 4/27/20 at 10:59;  Status DC


Heparin Sodium (Porcine) 1000 unit/Sodium Chloride 1,001 ml @  1,001 mls/hr 1X  

ONCE IRR ;  Start 4/27/20 at 12:00;  Stop 4/27/20 at 12:59;  Status DC


Propofol 20 ml @ As Directed STK-MED ONCE IV ;  Start 4/27/20 at 11:05;  Stop 

4/27/20 at 11:05;  Status DC


Sevoflurane (Ultane) 90 ml STK-MED ONCE IH ;  Start 4/27/20 at 11:05;  Stop 

4/27/20 at 11:05;  Status DC


Sevoflurane (Ultane) 60 ml STK-MED ONCE IH ;  Start 4/27/20 at 12:26;  Stop 

4/27/20 at 12:27;  Status DC


Propofol 20 ml @ As Directed STK-MED ONCE IV ;  Start 4/27/20 at 12:26;  Stop 

4/27/20 at 12:27;  Status DC


Phenylephrine HCl (PHENYLEPHRINE in 0.9% NACL PF) 1 mg STK-MED ONCE IV ;  Start 

4/27/20 at 12:34;  Stop 4/27/20 at 12:34;  Status DC


Heparin Sodium (Porcine) (Heparin Sodium) 5,000 unit Q12HR SQ  Last administered

on 5/6/20at 20:57;  Start 4/27/20 at 21:00;  Stop 5/7/20 at 09:59;  Status DC


Sodium Chloride (Normal Saline Flush) 3 ml QSHIFT  PRN IV AFTER MEDS AND BLOOD 

DRAWS;  Start 4/27/20 at 13:45


Naloxone HCl (Narcan) 0.4 mg PRN Q2MIN  PRN IV SEE INSTRUCTIONS;  Start 4/27/20 

at 13:45


Sodium Chloride 1,000 ml @  25 mls/hr Q24H IV  Last administered on 5/19/20at 

13:37;  Start 4/27/20 at 13:37


Naloxone HCl (Narcan) 0.4 mg PRN Q2MIN  PRN IV SEE INSTRUCTIONS;  Start 4/27/20 

at 14:30;  Status UNV


Sodium Chloride 1,000 ml @  25 mls/hr Q24H IV ;  Start 4/27/20 at 14:30;  Status

UNV


Hydromorphone HCl 30 ml @ 0 mls/hr CONT PRN  PRN IV PER PROTOCOL Last 

administered on 5/2/20at 16:08;  Start 4/27/20 at 14:30;  Stop 5/4/20 at 08:55; 

Status DC


Potassium Acetate 55 meq/Magnesium Sulfate 20 meq/ Calcium Gluconate 10 meq/ 

Multivitamins 10 ml/Chromium/ Copper/Manganese/ Seleni/Zn 0.5 ml/ Insulin Human 

Regular 35 unit/ Total Parenteral Nutrition/Amino Acids/Dextrose/ Fat Emulsion 

Intravenous 1,920 ml @  80 mls/hr TPN  CONT IV  Last administered on 4/27/20at 

22:01;  Start 4/27/20 at 22:00;  Stop 4/28/20 at 21:59;  Status DC


Bumetanide (Bumex) 2 mg [id number] IV  Last administered on 5/1/20at 13:50;  Start 

4/28/20 at 14:00;  Stop 5/2/20 at 14:10;  Status DC


Meropenem 1 gm/ Sodium Chloride 100 ml @  200 mls/hr Q8HRS IV  Last administered

on 5/20/20at 06:45;  Start 4/28/20 at 14:00


Potassium Acetate 55 meq/Magnesium Sulfate 20 meq/ Calcium Gluconate 10 meq/ 

Multivitamins 10 ml/Chromium/ Copper/Manganese/ Seleni/Zn 0.5 ml/ Insulin Human 

Regular 35 unit/ Total Parenteral Nutrition/Amino Acids/Dextrose/ Fat Emulsion 

Intravenous 1,920 ml @  80 mls/hr TPN  CONT IV  Last administered on 4/28/20at 

22:02;  Start 4/28/20 at 22:00;  Stop 4/29/20 at 21:59;  Status DC


Hydromorphone HCl (Dilaudid Standard PCA) 12 mg STK-MED ONCE IV ;  Start 4/27/20

at 14:35;  Stop 4/28/20 at 13:53;  Status DC


Artificial Tears (Artificial Tears) 1 drop PRN Q15MIN  PRN OU DRY EYE Last 

administered on 5/18/20at 08:08;  Start 4/29/20 at 05:30


Hydromorphone HCl (Dilaudid Standard PCA) 12 mg STK-MED ONCE IV ;  Start 4/28/20

at 12:05;  Stop 4/29/20 at 09:15;  Status DC


Potassium Acetate 65 meq/Magnesium Sulfate 20 meq/ Calcium Gluconate 10 meq/ 

Multivitamins 10 ml/Chromium/ Copper/Manganese/ Seleni/Zn 0.5 ml/ Insulin Human 

Regular 30 unit/ Total Parenteral Nutrition/Amino Acids/Dextrose/ Fat Emulsion 

Intravenous 1,920 ml @  80 mls/hr TPN  CONT IV  Last administered on 4/29/20at 

22:22;  Start 4/29/20 at 22:00;  Stop 4/30/20 at 21:59;  Status DC


Cyclobenzaprine HCl (Flexeril) 10 mg PRN Q6HRS  PRN PO MUSCLE SPASMS;  Start 

4/30/20 at 10:45


Potassium Acetate 55 meq/Magnesium Sulfate 20 meq/ Calcium Gluconate 10 meq/ 

Multivitamins 10 ml/Chromium/ Copper/Manganese/ Seleni/Zn 0.5 ml/ Insulin Human 

Regular 30 unit/ Total Parenteral Nutrition/Amino Acids/Dextrose/ Fat Emulsion 

Intravenous 1,920 ml @  80 mls/hr TPN  CONT IV  Last administered on 5/1/20at 

01:00;  Start 4/30/20 at 22:00;  Stop 5/1/20 at 21:59;  Status DC


Magnesium Sulfate 50 ml @ 25 mls/hr 1X  ONCE IV  Last administered on 4/30/20at 

17:18;  Start 4/30/20 at 12:45;  Stop 4/30/20 at 14:44;  Status DC


Potassium Chloride/Water 100 ml @  100 mls/hr 1X  ONCE IV  Last administered on 

5/1/20at 11:27;  Start 5/1/20 at 12:00;  Stop 5/1/20 at 12:59;  Status DC


Hydromorphone HCl (Dilaudid Standard PCA) 12 mg STK-MED ONCE IV ;  Start 4/29/20

at 10:50;  Stop 5/1/20 at 11:02;  Status DC


Hydromorphone HCl (Dilaudid Standard PCA) 12 mg STK-MED ONCE IV ;  Start 4/30/20

at 13:47;  Stop 5/1/20 at 11:03;  Status DC


Potassium Acetate 30 meq/Magnesium Sulfate 20 meq/ Calcium Gluconate 10 meq/ 

Multivitamins 10 ml/Chromium/ Copper/Manganese/ Seleni/Zn 0.5 ml/ Insulin Human 

Regular 30 unit/ Potassium Chloride 30 meq/ Total Parenteral Nutrition/Amino 

Acids/Dextrose/ Fat Emulsion Intravenous 1,920 ml @  80 mls/hr TPN  CONT IV  

Last administered on 5/1/20at 22:34;  Start 5/1/20 at 22:00;  Stop 5/2/20 at 

21:59;  Status DC


Potassium Chloride/Water 100 ml @  100 mls/hr Q1H IV  Last administered on 

5/2/20at 13:05;  Start 5/2/20 at 07:00;  Stop 5/2/20 at 10:59;  Status DC


Magnesium Sulfate 50 ml @ 25 mls/hr 1X  ONCE IV  Last administered on 5/2/20at 

10:34;  Start 5/2/20 at 10:30;  Stop 5/2/20 at 12:29;  Status DC


Potassium Chloride 75 meq/ Magnesium Sulfate 20 meq/Calcium Gluconate 10 meq/ 

Multivitamins 10 ml/Chromium/ Copper/Manganese/ Seleni/Zn 0.5 ml/ Insulin Human 

Regular 30 unit/ Total Parenteral Nutrition/Amino Acids/Dextrose/ Fat Emulsion 

Intravenous 1,920 ml @  80 mls/hr TPN  CONT IV  Last administered on 5/2/20at 

21:51;  Start 5/2/20 at 22:00;  Stop 5/3/20 at 22:00;  Status DC


Potassium Chloride 75 meq/ Magnesium Sulfate 20 meq/Calcium Gluconate 10 meq/ 

Multivitamins 10 ml/Chromium/ Copper/Manganese/ Seleni/Zn 0.5 ml/ Insulin Human 

Regular 25 unit/ Total Parenteral Nutrition/Amino Acids/Dextrose/ Fat Emulsion 

Intravenous 1,920 ml @  80 mls/hr TPN  CONT IV  Last administered on 5/3/20at 

22:04;  Start 5/3/20 at 22:00;  Stop 5/4/20 at 21:59;  Status DC


Hydromorphone HCl (Dilaudid) 0.4 mg PRN Q4HRS  PRN IVP PAIN Last administered on

5/4/20at 10:57;  Start 5/4/20 at 09:00;  Stop 5/4/20 at 18:59;  Status DC


Micafungin Sodium 100 mg/Dextrose 100 ml @  100 mls/hr Q24H IV  Last 

administered on 5/19/20at 11:21;  Start 5/4/20 at 11:00


Daptomycin 485 mg/ Sodium Chloride 50 ml @  100 mls/hr Q24H IV  Last 

administered on 5/11/20at 13:10;  Start 5/4/20 at 11:00;  Stop 5/12/20 at 07:44;

 Status DC


Potassium Chloride 75 meq/ Magnesium Sulfate 15 meq/Calcium Gluconate 8 meq/ Mul

tivitamins 10 ml/Chromium/ Copper/Manganese/ Seleni/Zn 0.5 ml/ Insulin Human 

Regular 25 unit/ Total Parenteral Nutrition/Amino Acids/Dextrose/ Fat Emulsion 

Intravenous 1,920 ml @  80 mls/hr TPN  CONT IV  Last administered on 5/4/20at 

23:08;  Start 5/4/20 at 22:00;  Stop 5/5/20 at 21:59;  Status DC


Haloperidol Lactate (Haldol Inj) 3 mg 1X  ONCE IVP  Last administered on 

5/4/20at 14:37;  Start 5/4/20 at 14:30;  Stop 5/4/20 at 14:31;  Status DC


Hydromorphone HCl (Dilaudid) 1 mg PRN Q4HRS  PRN IVP PAIN Last administered on 

5/18/20at 06:25;  Start 5/4/20 at 19:00;  Stop 5/18/20 at 17:10;  Status DC


Potassium Chloride 75 meq/ Magnesium Sulfate 15 meq/Calcium Gluconate 8 meq/ 

Multivitamins 10 ml/Chromium/ Copper/Manganese/ Seleni/Zn 0.5 ml/ Insulin Human 

Regular 20 unit/ Total Parenteral Nutrition/Amino Acids/Dextrose/ Fat Emulsion 

Intravenous 1,920 ml @  80 mls/hr TPN  CONT IV  Last administered on 5/5/20at 

22:10;  Start 5/5/20 at 22:00;  Stop 5/6/20 at 21:59;  Status DC


Lidocaine HCl (Buffered Lidocaine 1%) 3 ml STK-MED ONCE .ROUTE ;  Start 5/6/20 

at 11:31;  Stop 5/6/20 at 11:31;  Status DC


Lidocaine HCl (Buffered Lidocaine 1%) 3 ml STK-MED ONCE .ROUTE ;  Start 5/6/20 

at 12:28;  Stop 5/6/20 at 12:29;  Status DC


Lidocaine HCl (Buffered Lidocaine 1%) 6 ml 1X  ONCE INJ  Last administered on 

5/6/20at 12:53;  Start 5/6/20 at 12:45;  Stop 5/6/20 at 12:46;  Status DC


Potassium Chloride 75 meq/ Magnesium Sulfate 15 meq/Calcium Gluconate 8 meq/ 

Multivitamins 10 ml/Chromium/ Copper/Manganese/ Seleni/Zn 0.5 ml/ Insulin Human 

Regular 20 unit/ Total Parenteral Nutrition/Amino Acids/Dextrose/ Fat Emulsion 

Intravenous 1,920 ml @  80 mls/hr TPN  CONT IV  Last administered on 5/6/20at 

22:00;  Start 5/6/20 at 22:00;  Stop 5/7/20 at 21:59;  Status DC


Potassium Chloride 75 meq/ Magnesium Sulfate 15 meq/Calcium Gluconate 8 meq/ 

Multivitamins 10 ml/Chromium/ Copper/Manganese/ Seleni/Zn 0.5 ml/ Insulin Human 

Regular 15 unit/ Total Parenteral Nutrition/Amino Acids/Dextrose/ Fat Emulsion 

Intravenous 1,920 ml @  80 mls/hr TPN  CONT IV  Last administered on 5/7/20at 

22:28;  Start 5/7/20 at 22:00;  Stop 5/8/20 at 21:59;  Status DC


Vecuronium Bromide (Norcuron Bolus) 6 mg PRN Q6HRS  PRN IV VENT ASYNCHRONY;  

Start 5/7/20 at 19:15;  Stop 5/7/20 at 19:35;  Status DC


Bumetanide (Bumex) 2 mg 1X  ONCE IV  Last administered on 5/7/20at 22:09;  Start

5/7/20 at 19:45;  Stop 5/7/20 at 19:46;  Status DC


Lidocaine HCl (Buffered Lidocaine 1%) 3 ml STK-MED ONCE .ROUTE ;  Start 5/8/20 

at 07:59;  Stop 5/8/20 at 07:59;  Status DC


Midazolam HCl (Versed) 5 mg STK-MED ONCE .ROUTE ;  Start 5/8/20 at 08:36;  Stop 

5/8/20 at 08:36;  Status DC


Fentanyl Citrate (Fentanyl 5ml Vial) 250 mcg STK-MED ONCE .ROUTE ;  Start 5/8/20

at 08:36;  Stop 5/8/20 at 08:37;  Status DC


Lidocaine HCl (Buffered Lidocaine 1%) 3 ml 1X  ONCE IJ  Last administered on 

5/8/20at 09:30;  Start 5/8/20 at 09:15;  Stop 5/8/20 at 09:16;  Status DC


Midazolam HCl (Versed) 5 mg 1X  ONCE IV  Last administered on 5/8/20at 09:30;  

Start 5/8/20 at 09:15;  Stop 5/8/20 at 09:16;  Status DC


Fentanyl Citrate (Fentanyl 5ml Vial) 250 mcg 1X  ONCE IV  Last administered on 

5/8/20at 09:30;  Start 5/8/20 at 09:15;  Stop 5/8/20 at 09:16;  Status DC


Bumetanide (Bumex) 2 mg DAILY IV  Last administered on 5/18/20at 08:07;  Start 

5/8/20 at 10:00;  Stop 5/18/20 at 17:15;  Status DC


Potassium Chloride 75 meq/ Magnesium Sulfate 15 meq/ Multivitamins 10 

ml/Chromium/ Copper/Manganese/ Seleni/Zn 0.5 ml/ Insulin Human Regular 15 unit/ 

Total Parenteral Nutrition/Amino Acids/Dextrose/ Fat Emulsion Intravenous 1,920 

ml @  80 mls/hr TPN  CONT IV  Last administered on 5/8/20at 21:59;  Start 5/8/20

at 22:00;  Stop 5/9/20 at 21:59;  Status DC


Metoclopramide HCl (Reglan Vial) 10 mg PRN Q3HRS  PRN IVP NAUSEA/VOMITING-3rd 

choice Last administered on 5/14/20at 04:25;  Start 5/9/20 at 16:45


Potassium Chloride 75 meq/ Magnesium Sulfate 15 meq/ Multivitamins 10 

ml/Chromium/ Copper/Manganese/ Seleni/Zn 0.5 ml/ Insulin Human Regular 15 unit/ 

Total Parenteral Nutrition/Amino Acids/Dextrose/ Fat Emulsion Intravenous 1,920 

ml @  80 mls/hr TPN  CONT IV  Last administered on 5/9/20at 22:41;  Start 5/9/20

at 22:00;  Stop 5/10/20 at 21:59;  Status DC


Magnesium Sulfate 50 ml @ 25 mls/hr 1X  ONCE IV  Last administered on 5/10/20at 

10:44;  Start 5/10/20 at 09:00;  Stop 5/10/20 at 10:59;  Status DC


Potassium Chloride/Water 100 ml @  100 mls/hr 1X  ONCE IV  Last administered on 

5/10/20at 09:37;  Start 5/10/20 at 09:00;  Stop 5/10/20 at 09:59;  Status DC


Duloxetine HCl (Cymbalta) 30 mg DAILY PO  Last administered on 5/11/20at 09:48; 

Start 5/10/20 at 14:00;  Stop 5/13/20 at 10:25;  Status DC


Potassium Chloride 80 meq/ Magnesium Sulfate 20 meq/ Multivitamins 10 

ml/Chromium/ Copper/Manganese/ Seleni/Zn 0.5 ml/ Insulin Human Regular 15 unit/ 

Total Parenteral Nutrition/Amino Acids/Dextrose/ Fat Emulsion Intravenous 1,920 

ml @  80 mls/hr TPN  CONT IV  Last administered on 5/10/20at 21:42;  Start 

5/10/20 at 22:00;  Stop 5/11/20 at 21:59;  Status DC


Potassium Chloride 80 meq/ Magnesium Sulfate 20 meq/ Multivitamins 10 

ml/Chromium/ Copper/Manganese/ Seleni/Zn 0.5 ml/ Insulin Human Regular 15 unit/ 

Total Parenteral Nutrition/Amino Acids/Dextrose/ Fat Emulsion Intravenous 1,920 

ml @  80 mls/hr TPN  CONT IV  Last administered on 5/11/20at 22:20;  Start 

5/11/20 at 22:00;  Stop 5/12/20 at 21:59;  Status DC


Lidocaine HCl (Buffered Lidocaine 1%) 3 ml STK-MED ONCE .ROUTE ;  Start 5/12/20 

at 09:54;  Stop 5/12/20 at 09:55;  Status DC


Hydromorphone HCl (Dilaudid Standard PCA) 12 mg STK-MED ONCE IV ;  Start 5/1/20 

at 15:50;  Stop 5/12/20 at 11:24;  Status DC


Potassium Chloride 80 meq/ Magnesium Sulfate 20 meq/ Multivitamins 10 

ml/Chromium/ Copper/Manganese/ Seleni/Zn 0.5 ml/ Insulin Human Regular 15 unit/ 

Total Parenteral Nutrition/Amino Acids/Dextrose/ Fat Emulsion Intravenous 1,920 

ml @  80 mls/hr TPN  CONT IV  Last administered on 5/12/20at 21:40;  Start 

5/12/20 at 22:00;  Stop 5/13/20 at 21:59;  Status DC


Lidocaine HCl (Buffered Lidocaine 1%) 6 ml 1X  ONCE INJ  Last administered on 

5/12/20at 14:15;  Start 5/12/20 at 14:15;  Stop 5/12/20 at 14:16;  Status DC


Potassium Chloride 80 meq/ Magnesium Sulfate 20 meq/ Multivitamins 10 

ml/Chromium/ Copper/Manganese/ Seleni/Zn 1 ml/ Insulin Human Regular 15 unit/ 

Total Parenteral Nutrition/Amino Acids/Dextrose/ Fat Emulsion Intravenous 1,920 

ml @  80 mls/hr TPN  CONT IV  Last administered on 5/13/20at 22:04;  Start 

5/13/20 at 22:00;  Stop 5/14/20 at 21:59;  Status DC


Potassium Chloride/Water 100 ml @  100 mls/hr 1X  ONCE IV  Last administered on 

5/14/20at 11:34;  Start 5/14/20 at 11:00;  Stop 5/14/20 at 11:59;  Status DC


Potassium Chloride 90 meq/ Magnesium Sulfate 20 meq/ Multivitamins 10 

ml/Chromium/ Copper/Manganese/ Seleni/Zn 1 ml/ Insulin Human Regular 15 unit/ 

Total Parenteral Nutrition/Amino Acids/Dextrose/ Fat Emulsion Intravenous 1,920 

ml @  80 mls/hr TPN  CONT IV  Last administered on 5/14/20at 22:57;  Start 

5/14/20 at 22:00;  Stop 5/15/20 at 21:59;  Status DC


Potassium Chloride 90 meq/ Magnesium Sulfate 20 meq/ Multivitamins 10 

ml/Chromium/ Copper/Manganese/ Seleni/Zn 1 ml/ Insulin Human Regular 15 unit/ 

Total Parenteral Nutrition/Amino Acids/Dextrose/ Fat Emulsion Intravenous 1,920 

ml @  80 mls/hr TPN  CONT IV  Last administered on 5/15/20at 22:48;  Start 

5/15/20 at 22:00;  Stop 5/16/20 at 21:59;  Status DC


Potassium Chloride 90 meq/ Magnesium Sulfate 20 meq/ Multivitamins 10 

ml/Chromium/ Copper/Manganese/ Seleni/Zn 1 ml/ Insulin Human Regular 15 unit/ 

Total Parenteral Nutrition/Amino Acids/Dextrose/ Fat Emulsion Intravenous 1,890 

ml @  78.75 mls/ hr TPN  CONT IV  Last administered on 5/16/20at 22:15;  Start 

5/16/20 at 22:00;  Stop 5/17/20 at 21:59;  Status DC


Linezolid/Dextrose 300 ml @  300 mls/hr Q12HR IV  Last administered on 5/19/20at

21:08;  Start 5/17/20 at 09:00;  Stop 5/20/20 at 08:11;  Status DC


Daptomycin 450 mg/ Sodium Chloride 50 ml @  100 mls/hr Q24H IV  Last 

administered on 5/20/20at 09:25;  Start 5/17/20 at 09:00


Potassium Chloride 90 meq/ Magnesium Sulfate 20 meq/ Multivitamins 10 

ml/Chromium/ Copper/Manganese/ Seleni/Zn 1 ml/ Insulin Human Regular 15 unit/ 

Total Parenteral Nutrition/Amino Acids/Dextrose/ Fat Emulsion Intravenous 1,890 

ml @  78.75 mls/ hr TPN  CONT IV  Last administered on 5/17/20at 21:34;  Start 

5/17/20 at 22:00;  Stop 5/18/20 at 21:59;  Status DC


Lorazepam (Ativan Inj) 2 mg STK-MED ONCE .ROUTE ;  Start 5/17/20 at 14:58;  Stop

5/17/20 at 14:58;  Status DC


Metoprolol Tartrate (Lopressor Vial) 5 mg 1X  ONCE IVP  Last administered on 

5/17/20at 15:31;  Start 5/17/20 at 15:15;  Stop 5/17/20 at 15:16;  Status DC


Lorazepam (Ativan Inj) 2 mg 1X  ONCE IVP  Last administered on 5/17/20at 15:30; 

Start 5/17/20 at 15:15;  Stop 5/17/20 at 15:16;  Status DC


Enoxaparin Sodium (Lovenox 40mg Syringe) 40 mg Q24H SQ  Last administered on 

5/19/20at 17:43;  Start 5/17/20 at 17:00


Lorazepam (Ativan Inj) 1 mg PRN Q4HRS  PRN IVP ANXIETY / AGITATION MILD-MOD;  

Start 5/17/20 at 19:15


Lorazepam (Ativan Inj) 2 mg PRN Q4HRS  PRN IVP ANXIETY / AGITATION SEVERE Last 

administered on 5/18/20at 22:20;  Start 5/17/20 at 19:15


Fentanyl Citrate (Fentanyl 2ml Vial) 50 mcg PRN Q4HRS  PRN IVP SEVERE PAIN Last 

administered on 5/20/20at 05:00;  Start 5/18/20 at 13:15


Fentanyl Citrate (Fentanyl 2ml Vial) 25 mcg PRN Q4HRS  PRN IVP MODERATE PAIN 

Last administered on 5/18/20at 17:13;  Start 5/18/20 at 13:15


Potassium Chloride 90 meq/ Magnesium Sulfate 20 meq/ Multivitamins 10 

ml/Chromium/ Copper/Manganese/ Seleni/Zn 1 ml/ Insulin Human Regular 15 unit/ 

Total Parenteral Nutrition/Amino Acids/Dextrose/ Fat Emulsion Intravenous 1,890 

ml @  78.75 mls/ hr TPN  CONT IV  Last administered on 5/18/20at 22:18;  Start 

5/18/20 at 22:00;  Stop 5/19/20 at 21:59;  Status DC


Furosemide (Lasix) 40 mg 1X  ONCE IVP  Last administered on 5/18/20at 21:51;  

Start 5/18/20 at 21:45;  Stop 5/18/20 at 21:48;  Status DC


Albumin Human 100 ml @  100 mls/hr 1X PRN  PRN IV SEE COMMENTS;  Start 5/19/20 

at 01:30


Furosemide (Lasix) 40 mg [id number] IVP  Last administered on 5/20/20at 09:34;  Start

5/19/20 at 14:00


Potassium Chloride 90 meq/ Magnesium Sulfate 20 meq/ Multivitamins 10 

ml/Chromium/ Copper/Manganese/ Seleni/Zn 1 ml/ Insulin Human Regular 15 unit/ 

Total Parenteral Nutrition/Amino Acids/Dextrose/ Fat Emulsion Intravenous 1,800 

ml @  75 mls/hr TPN  CONT IV  Last administered on 5/19/20at 22:31;  Start 

5/19/20 at 22:00;  Stop 5/20/20 at 21:59


Potassium Chloride 90 meq/ Magnesium Sulfate 20 meq/ Multivitamins 10 

ml/Chromium/ Copper/Manganese/ Seleni/Zn 1 ml/ Insulin Human Regular 15 unit/ 

Total Parenteral Nutrition/Amino Acids/Dextrose/ Fat Emulsion Intravenous 1,800 

ml @  75 mls/hr TPN  CONT IV ;  Start 5/20/20 at 22:00;  Stop 5/21/20 at 21:59





Active Scripts


Active


Reported


Bisoprolol Fumarate 5 Mg Tablet 10 Mg PO DAILY


Vitals/I & O





Vital Sign - Last 24 Hours








 5/19/20 5/19/20 5/19/20 5/19/20





 13:00 14:00 15:00 15:13


 


Pulse 108 120 98 


 


Resp 16 24 13 


 


B/P (MAP) 114/63 (80) 113/65 (81) 112/71 (85) 


 


Pulse Ox 100 100 99 100


 


O2 Delivery Ventilator Ventilator Ventilator Ventilator





 5/19/20 5/19/20 5/19/20 5/19/20





 16:00 16:00 16:50 17:00


 


Temp  97.4  





  97.4  


 


Pulse  94  92


 


Resp  11  13


 


B/P (MAP)  101/66 (78)  112/71 (85)


 


Pulse Ox  100 99 100


 


O2 Delivery Mechanical Ventilator Ventilator Ventilator Ventilator


 


    





    





 5/19/20 5/19/20 5/19/20 5/19/20





 18:00 19:00 19:50 20:00


 


Temp    98.3





    98.3


 


Pulse 88 86  80


 


Resp 9 12  10


 


B/P (MAP) 100/59 (73) 96/61 (73)  95/51 (66)


 


Pulse Ox 100 100 99 100


 


O2 Delivery Ventilator Ventilator Ventilator Ventilator


 


    





    





 5/19/20 5/19/20 5/19/20 5/19/20





 20:00 20:28 21:00 22:00


 


Pulse   84 78


 


Resp  16 13 12


 


B/P (MAP)   103/50 (67) 99/61 (74)


 


Pulse Ox  100 100 100


 


O2 Delivery Mechanical Ventilator Ventilator Ventilator Ventilator





 5/19/20 5/20/20 5/20/20 5/20/20





 23:00 00:00 00:00 00:10


 


Temp  98.5  





  98.5  


 


Pulse 80 79  


 


Resp 12 30  


 


B/P (MAP) 94/48 (63) 90/54 (66)  


 


Pulse Ox 100 100  99


 


O2 Delivery Ventilator Ventilator Mechanical Ventilator Ventilator


 


    





    





 5/20/20 5/20/20 5/20/20 5/20/20





 01:00 02:00 03:00 04:00


 


Pulse 80 80 78 


 


Resp 15 14 14 


 


B/P (MAP) 97/56 (70) 91/45 (60) 101/58 (72) 


 


Pulse Ox 100 100 100 


 


O2 Delivery Ventilator Ventilator Ventilator Mechanical Ventilator





 5/20/20 5/20/20 5/20/20 5/20/20





 04:00 04:05 05:00 05:00


 


Temp 98.2   





 98.2   


 


Pulse 72   76


 


Resp 12  30 12


 


B/P (MAP) 96/58 (71)   110/59 (76)


 


Pulse Ox 100 99 100 100


 


O2 Delivery Ventilator Ventilator Ventilator Ventilator


 


    





    





 5/20/20 5/20/20 5/20/20 5/20/20





 06:00 07:00 08:00 08:22


 


Pulse 76 72  


 


Resp 11 14  


 


B/P (MAP) 103/62 (76) 100/58 (72)  


 


Pulse Ox 99 100  100


 


O2 Delivery Ventilator Ventilator Mechanical Ventilator Ventilator





 5/20/20   





 10:10   


 


Pulse Ox 100   


 


O2 Delivery Tracheal Collar   


 


O2 Flow Rate 8.0   














Intake and Output   


 


 5/19/20 5/19/20 5/20/20





 15:00 23:00 07:00


 


Intake Total  2565.1 ml 639 ml


 


Output Total 475 ml 940 ml 705 ml


 


Balance -475 ml 1625.1 ml -66 ml











Hemodynamically unstable?:  No


Is patient in severe pain?:  No


Is NPO status required?:  Yes











MG ARGUETA MD                 May 20, 2020 12:06

## 2020-05-20 NOTE — PDOC
SURGICAL PROGRESS NOTE


Subjective


Pt off ventilator and sitting in chair


Vital Signs





Vital Signs








  Date Time  Temp Pulse Resp B/P (MAP) Pulse Ox O2 Delivery O2 Flow Rate FiO2


 


5/20/20 18:00  74 28 110/64 (79) 98 Tracheal Collar 6.0 


 


5/20/20 17:00 98.2       





 98.2       








I&O











Intake and Output 


 


 5/20/20





 07:00


 


Intake Total 3204.1 ml


 


Output Total 2120 ml


 


Balance 1084.1 ml


 


 


 


Intake Oral 0 ml


 


IV Total 3204.1 ml


 


Output Urine Total 1685 ml


 


Gastric Drainage Total 150 ml


 


Drainage Total 285 ml








General:  Alert, Cooperative


Abdomen:  Soft, Other (drains in place)


Labs





Laboratory Tests








Test


 5/18/20


23:33 5/19/20


00:09 5/19/20


05:10 5/19/20


05:20


 


O2 Saturation 98 % (92-99)    


 


Arterial Blood pH


 7.42


(7.35-7.45) 


 


 





 


Arterial Blood pCO2 at


Patient Temp 78 mmHg


(35-46) 


 


 





 


Arterial Blood pO2 at Patient


Temp 122 mmHg


() 


 


 





 


Arterial Blood HCO3


 49 mmol/L


(21-28) 


 


 





 


Arterial Blood Base Excess


 22 mmol/L


(-3-3) 


 


 





 


FiO2 32    


 


Glucose (Fingerstick)


 


 227 mg/dL


(70-99) 


 153 mg/dL


(70-99)


 


White Blood Count


 


 


 11.3 x10^3/uL


(4.0-11.0) 





 


Red Blood Count


 


 


 2.58 x10^6/uL


(3.50-5.40) 





 


Hemoglobin


 


 


 7.4 g/dL


(12.0-15.5) 





 


Hematocrit


 


 


 22.7 %


(36.0-47.0) 





 


Mean Corpuscular Volume   88 fL ()  


 


Mean Corpuscular Hemoglobin   29 pg (25-35)  


 


Mean Corpuscular Hemoglobin


Concent 


 


 32 g/dL


(31-37) 





 


Red Cell Distribution Width


 


 


 18.1 %


(11.5-14.5) 





 


Platelet Count


 


 


 485 x10^3/uL


(140-400) 





 


Neutrophils (%) (Auto)   74 % (31-73)  


 


Lymphocytes (%) (Auto)   19 % (24-48)  


 


Monocytes (%) (Auto)   7 % (0-9)  


 


Eosinophils (%) (Auto)   1 % (0-3)  


 


Basophils (%) (Auto)   0 % (0-3)  


 


Neutrophils # (Auto)


 


 


 8.3 x10^3/uL


(1.8-7.7) 





 


Lymphocytes # (Auto)


 


 


 2.1 x10^3/uL


(1.0-4.8) 





 


Monocytes # (Auto)


 


 


 0.7 x10^3/uL


(0.0-1.1) 





 


Eosinophils # (Auto)


 


 


 0.1 x10^3/uL


(0.0-0.7) 





 


Basophils # (Auto)


 


 


 0.0 x10^3/uL


(0.0-0.2) 





 


Test


 5/19/20


08:00 5/19/20


12:29 5/19/20


17:42 5/19/20


23:41


 


O2 Saturation 97 % (92-99)    


 


Arterial Blood pH


 7.67


(7.35-7.45) 


 


 





 


Arterial Blood pCO2 at


Patient Temp 37 mmHg


(35-46) 


 


 





 


Arterial Blood pO2 at Patient


Temp 80 mmHg


() 


 


 





 


Arterial Blood HCO3


 42 mmol/L


(21-28) 


 


 





 


Arterial Blood Base Excess


 20 mmol/L


(-3-3) 


 


 





 


FiO2 40    


 


Glucose (Fingerstick)


 


 251 mg/dL


(70-99) 122 mg/dL


(70-99) 207 mg/dL


(70-99)


 


Test


 5/20/20


06:45 5/20/20


06:53 5/20/20


09:30 5/20/20


12:31


 


Sodium Level


 140 mmol/L


(136-145) 


 


 





 


Potassium Level


 3.9 mmol/L


(3.5-5.1) 


 


 





 


Chloride Level


 99 mmol/L


() 


 


 





 


Carbon Dioxide Level


 41 mmol/L


(21-32) 


 


 





 


Anion Gap 0 (6-14)    


 


Blood Urea Nitrogen


 30 mg/dL


(7-20) 


 


 





 


Creatinine


 0.7 mg/dL


(0.6-1.0) 


 


 





 


Estimated GFR


(Cockcroft-Gault) 88.9 


 


 


 





 


Glucose Level


 147 mg/dL


(70-99) 


 


 





 


Calcium Level


 8.8 mg/dL


(8.5-10.1) 


 


 





 


Glucose (Fingerstick)


 


 146 mg/dL


(70-99) 


 258 mg/dL


(70-99)


 


O2 Saturation   97 % (92-99)  


 


Arterial Blood pH


 


 


 7.50


(7.35-7.45) 





 


Arterial Blood pCO2 at


Patient Temp 


 


 54 mmHg


(35-46) 





 


Arterial Blood pO2 at Patient


Temp 


 


 106 mmHg


() 





 


Arterial Blood HCO3


 


 


 41 mmol/L


(21-28) 





 


Arterial Blood Base Excess


 


 


 16 mmol/L


(-3-3) 





 


FiO2   30% trial  


 


Test


 5/20/20


16:28 


 


 





 


Glucose (Fingerstick)


 141 mg/dL


(70-99) 


 


 











Laboratory Tests








Test


 5/19/20


23:41 5/20/20


06:45 5/20/20


06:53 5/20/20


09:30


 


Glucose (Fingerstick)


 207 mg/dL


(70-99) 


 146 mg/dL


(70-99) 





 


Sodium Level


 


 140 mmol/L


(136-145) 


 





 


Potassium Level


 


 3.9 mmol/L


(3.5-5.1) 


 





 


Chloride Level


 


 99 mmol/L


() 


 





 


Carbon Dioxide Level


 


 41 mmol/L


(21-32) 


 





 


Anion Gap  0 (6-14)   


 


Blood Urea Nitrogen


 


 30 mg/dL


(7-20) 


 





 


Creatinine


 


 0.7 mg/dL


(0.6-1.0) 


 





 


Estimated GFR


(Cockcroft-Gault) 


 88.9 


 


 





 


Glucose Level


 


 147 mg/dL


(70-99) 


 





 


Calcium Level


 


 8.8 mg/dL


(8.5-10.1) 


 





 


O2 Saturation    97 % (92-99) 


 


Arterial Blood pH


 


 


 


 7.50


(7.35-7.45)


 


Arterial Blood pCO2 at


Patient Temp 


 


 


 54 mmHg


(35-46)


 


Arterial Blood pO2 at Patient


Temp 


 


 


 106 mmHg


()


 


Arterial Blood HCO3


 


 


 


 41 mmol/L


(21-28)


 


Arterial Blood Base Excess


 


 


 


 16 mmol/L


(-3-3)


 


FiO2    30% trial 


 


Test


 5/20/20


12:31 5/20/20


16:28 


 





 


Glucose (Fingerstick)


 258 mg/dL


(70-99) 141 mg/dL


(70-99) 


 











Problem List


Problems


Medical Problems:


(1) Acute pancreatitis


Status: Acute  





(2) Cholelithiasis


Status: Acute  








Assessment/Plan


s/p lap exploration


appears to be improving


cont supportive care.











GAMAL ZHOU MD             May 20, 2020 19:57

## 2020-05-20 NOTE — PDOC
Objective:


Objective:


D/w nurse - better today, gets really anxious.


Vital Signs:





                                   Vital Signs








  Date Time  Temp Pulse Resp B/P (MAP) Pulse Ox O2 Delivery O2 Flow Rate FiO2


 


5/20/20 08:22     100 Ventilator  


 


5/20/20 07:00  72 14 100/58 (72)    


 


5/20/20 04:00 98.2       





 98.2       








Labs:





Laboratory Tests








Test


 5/19/20


12:29 5/19/20


17:42 5/19/20


23:41 5/20/20


06:45


 


Glucose (Fingerstick) 251 mg/dL  122 mg/dL  207 mg/dL  


 


Sodium Level    140 mmol/L 


 


Potassium Level    3.9 mmol/L 


 


Chloride Level    99 mmol/L 


 


Carbon Dioxide Level    41 mmol/L 


 


Anion Gap    0 


 


Blood Urea Nitrogen    30 mg/dL 


 


Creatinine    0.7 mg/dL 


 


Estimated GFR


(Cockcroft-Gault) 


 


 


 88.9 





 


Glucose Level    147 mg/dL 


 


Calcium Level    8.8 mg/dL 


 


Test


 5/20/20


06:53 5/20/20


09:30 


 





 


Glucose (Fingerstick) 146 mg/dL    


 


O2 Saturation  97 %   


 


Arterial Blood pH  7.50   


 


Arterial Blood pCO2 at


Patient Temp 


 54 mmHg 


 


 





 


Arterial Blood pO2 at Patient


Temp 


 106 mmHg 


 


 





 


Arterial Blood HCO3  41 mmol/L   


 


Arterial Blood Base Excess  16 mmol/L   


 


FiO2  30% trial   








  BLOOD CULTURE  Preliminary  


        NO GROWTH AFTER 3 DAYS





PE:





GEN: NAD


HEENT: trach/vent, NG bilious


NEURO/PSYCH: sleeping, not awakened





A/P:


Gallstone pancreatitis, MOSF





--


Continue same per GI.





Hemodynamically unstable?:  No


Is patient in severe pain?:  No


Is NPO status required?:  Yes











RAGHAVENDRA MURCIA         May 20, 2020 10:05

## 2020-05-20 NOTE — NUR
SS following up with discharge planning. SS reviewed pt chart and discussed with pt RN. Pt 
is on the trach collar, TPN, and IV antibiotics. Pt has continued ROBERT drains and NG tube. Pt 
continuing to improve with PT/OT. [facility] reporting that they still need additional information 
from pt's daughter to complete disability application. SS will continue to follow for 
discharge planning.

## 2020-05-20 NOTE — NUR
Patient transported outside via wheelchair, mask. Sat outside with RN near main entrance, 
fountain for 20 minutes. Patient wheeled back into room, bathed, and placed back in bed with 
help of RN.



See assessments, VS.

## 2020-05-20 NOTE — NUR
Pharmacy TPN Dosing Note



S: JESENIA BEAN is a 49 year old F Currently receiving Central Continuous TPN started 
03/18/20



B:Pertinent PMH: 

Necrotizing pancreatitis

Height: 5 feet, 8 inches

Weight: 83.253569 kg



Current diet: NPO 



LABS:

Sodium:    140 

Potassium: 3.9 

Chloride:  99 

Calcium:   8.8 

Corrected Calcium: 10.24 

Magnesium: 2.0 

CO2:       38 

SCr:       0.7 

Glucose:   146-147 

Albumin:   2.2 

AST:       50 

ALT:       50 



TPN FORMULA:

TPN TYPE:  Central Continuous

AMINO ACIDS:         90 gm

DEXTROSE:            250 gm

LIPIDS:              30 gm

SODIUM CHLORIDE:     - mEq

SODIUM ACETATE:      - mEq

SODIUM PHOSPHATE:    - mmol

POTASSIUM CHLORIDE:  90 mEq

POTASSIUM ACETATE:   - mEq

POTASSIUM PHOSPHATE: - mmol

MAGNESIUM:           20 mEq

CALCIUM:             - mEq

INSULIN:             15 units

MULTIPLE VITAMIN:    10 ml

TRACE ELEMENTS:      1 ml(s)



TPN PLAN:  

Labs stable today. Drop in K noted from 2 days ago, will watch trend. No

changes today. BMP/phos/mag in AM.





R: Continue TPN AS ABOVE.

Will monitor electrolytes, glucose, and tolerance to TPN.



 CANDACE BELTRE [facility], 05/20/20 [id number]

## 2020-05-21 VITALS — DIASTOLIC BLOOD PRESSURE: 75 MMHG | SYSTOLIC BLOOD PRESSURE: 118 MMHG

## 2020-05-21 VITALS — DIASTOLIC BLOOD PRESSURE: 91 MMHG | SYSTOLIC BLOOD PRESSURE: 130 MMHG

## 2020-05-21 VITALS — DIASTOLIC BLOOD PRESSURE: 73 MMHG | SYSTOLIC BLOOD PRESSURE: 134 MMHG

## 2020-05-21 VITALS — SYSTOLIC BLOOD PRESSURE: 116 MMHG | DIASTOLIC BLOOD PRESSURE: 73 MMHG

## 2020-05-21 VITALS — SYSTOLIC BLOOD PRESSURE: 154 MMHG | DIASTOLIC BLOOD PRESSURE: 78 MMHG

## 2020-05-21 VITALS — DIASTOLIC BLOOD PRESSURE: 68 MMHG | SYSTOLIC BLOOD PRESSURE: 140 MMHG

## 2020-05-21 VITALS — SYSTOLIC BLOOD PRESSURE: 119 MMHG | DIASTOLIC BLOOD PRESSURE: 68 MMHG

## 2020-05-21 VITALS — DIASTOLIC BLOOD PRESSURE: 54 MMHG | SYSTOLIC BLOOD PRESSURE: 95 MMHG

## 2020-05-21 VITALS — DIASTOLIC BLOOD PRESSURE: 78 MMHG | SYSTOLIC BLOOD PRESSURE: 153 MMHG

## 2020-05-21 VITALS — SYSTOLIC BLOOD PRESSURE: 113 MMHG | DIASTOLIC BLOOD PRESSURE: 71 MMHG

## 2020-05-21 VITALS — DIASTOLIC BLOOD PRESSURE: 68 MMHG | SYSTOLIC BLOOD PRESSURE: 101 MMHG

## 2020-05-21 VITALS — SYSTOLIC BLOOD PRESSURE: 157 MMHG | DIASTOLIC BLOOD PRESSURE: 73 MMHG

## 2020-05-21 VITALS — SYSTOLIC BLOOD PRESSURE: 163 MMHG | DIASTOLIC BLOOD PRESSURE: 80 MMHG

## 2020-05-21 VITALS — DIASTOLIC BLOOD PRESSURE: 73 MMHG | SYSTOLIC BLOOD PRESSURE: 141 MMHG

## 2020-05-21 VITALS — DIASTOLIC BLOOD PRESSURE: 72 MMHG | SYSTOLIC BLOOD PRESSURE: 125 MMHG

## 2020-05-21 VITALS — DIASTOLIC BLOOD PRESSURE: 62 MMHG | SYSTOLIC BLOOD PRESSURE: 112 MMHG

## 2020-05-21 VITALS — DIASTOLIC BLOOD PRESSURE: 85 MMHG | SYSTOLIC BLOOD PRESSURE: 164 MMHG

## 2020-05-21 VITALS — SYSTOLIC BLOOD PRESSURE: 148 MMHG | DIASTOLIC BLOOD PRESSURE: 86 MMHG

## 2020-05-21 VITALS — DIASTOLIC BLOOD PRESSURE: 62 MMHG | SYSTOLIC BLOOD PRESSURE: 104 MMHG

## 2020-05-21 VITALS — SYSTOLIC BLOOD PRESSURE: 173 MMHG | DIASTOLIC BLOOD PRESSURE: 99 MMHG

## 2020-05-21 VITALS — DIASTOLIC BLOOD PRESSURE: 72 MMHG | SYSTOLIC BLOOD PRESSURE: 111 MMHG

## 2020-05-21 VITALS — SYSTOLIC BLOOD PRESSURE: 105 MMHG | DIASTOLIC BLOOD PRESSURE: 57 MMHG

## 2020-05-21 VITALS — DIASTOLIC BLOOD PRESSURE: 81 MMHG | SYSTOLIC BLOOD PRESSURE: 128 MMHG

## 2020-05-21 LAB
ANION GAP SERPL CALC-SCNC: 0 MMOL/L (ref 6–14)
BUN SERPL-MCNC: 24 MG/DL (ref 7–20)
CALCIUM SERPL-MCNC: 8.6 MG/DL (ref 8.5–10.1)
CHLORIDE SERPL-SCNC: 100 MMOL/L (ref 98–107)
CO2 SERPL-SCNC: 39 MMOL/L (ref 21–32)
CREAT SERPL-MCNC: 0.7 MG/DL (ref 0.6–1)
ERYTHROCYTE [DISTWIDTH] IN BLOOD BY AUTOMATED COUNT: 18.2 % (ref 11.5–14.5)
GFR SERPLBLD BASED ON 1.73 SQ M-ARVRAT: 88.9 ML/MIN
GLUCOSE SERPL-MCNC: 118 MG/DL (ref 70–99)
HCT VFR BLD CALC: 23.8 % (ref 36–47)
HGB BLD-MCNC: 7.8 G/DL (ref 12–15.5)
MAGNESIUM SERPL-MCNC: 1.9 MG/DL (ref 1.8–2.4)
MCH RBC QN AUTO: 29 PG (ref 25–35)
MCHC RBC AUTO-ENTMCNC: 33 G/DL (ref 31–37)
MCV RBC AUTO: 87 FL (ref 79–100)
PHOSPHATE SERPL-MCNC: 3 MG/DL (ref 2.6–4.7)
PLATELET # BLD AUTO: 380 X10^3/UL (ref 140–400)
POTASSIUM SERPL-SCNC: 4 MMOL/L (ref 3.5–5.1)
RBC # BLD AUTO: 2.72 X10^6/UL (ref 3.5–5.4)
SODIUM SERPL-SCNC: 139 MMOL/L (ref 136–145)
WBC # BLD AUTO: 8.3 X10^3/UL (ref 4–11)

## 2020-05-21 RX ADMIN — FENTANYL CITRATE PRN MCG: 50 INJECTION INTRAMUSCULAR; INTRAVENOUS at 13:02

## 2020-05-21 RX ADMIN — FUROSEMIDE SCH MG: 10 INJECTION, SOLUTION INTRAMUSCULAR; INTRAVENOUS at 08:23

## 2020-05-21 RX ADMIN — PANTOPRAZOLE SODIUM SCH MG: 40 INJECTION, POWDER, FOR SOLUTION INTRAVENOUS at 08:22

## 2020-05-21 RX ADMIN — INSULIN LISPRO SCH UNITS: 100 INJECTION, SOLUTION INTRAVENOUS; SUBCUTANEOUS at 12:11

## 2020-05-21 RX ADMIN — FUROSEMIDE SCH MG: 10 INJECTION, SOLUTION INTRAMUSCULAR; INTRAVENOUS at 14:16

## 2020-05-21 RX ADMIN — INSULIN LISPRO SCH UNITS: 100 INJECTION, SOLUTION INTRAVENOUS; SUBCUTANEOUS at 00:46

## 2020-05-21 RX ADMIN — INSULIN LISPRO SCH UNITS: 100 INJECTION, SOLUTION INTRAVENOUS; SUBCUTANEOUS at 17:46

## 2020-05-21 RX ADMIN — DEXMEDETOMIDINE HYDROCHLORIDE PRN MLS/HR: 100 INJECTION, SOLUTION, CONCENTRATE INTRAVENOUS at 05:51

## 2020-05-21 RX ADMIN — DEXTROSE SCH MLS/HR: 50 INJECTION, SOLUTION INTRAVENOUS at 10:53

## 2020-05-21 RX ADMIN — FENTANYL CITRATE PRN MCG: 50 INJECTION INTRAMUSCULAR; INTRAVENOUS at 17:07

## 2020-05-21 RX ADMIN — FENTANYL CITRATE PRN MCG: 50 INJECTION INTRAMUSCULAR; INTRAVENOUS at 21:47

## 2020-05-21 RX ADMIN — ENOXAPARIN SODIUM SCH MG: 40 INJECTION SUBCUTANEOUS at 17:06

## 2020-05-21 RX ADMIN — MEROPENEM SCH MLS/HR: 1 INJECTION, POWDER, FOR SOLUTION INTRAVENOUS at 05:48

## 2020-05-21 RX ADMIN — MEROPENEM SCH MLS/HR: 1 INJECTION, POWDER, FOR SOLUTION INTRAVENOUS at 14:13

## 2020-05-21 RX ADMIN — DEXMEDETOMIDINE HYDROCHLORIDE PRN MLS/HR: 100 INJECTION, SOLUTION, CONCENTRATE INTRAVENOUS at 13:18

## 2020-05-21 RX ADMIN — INSULIN LISPRO SCH UNITS: 100 INJECTION, SOLUTION INTRAVENOUS; SUBCUTANEOUS at 06:00

## 2020-05-21 RX ADMIN — MEROPENEM SCH MLS/HR: 1 INJECTION, POWDER, FOR SOLUTION INTRAVENOUS at 21:59

## 2020-05-21 RX ADMIN — Medication PRN EACH: at 11:11

## 2020-05-21 RX ADMIN — FENTANYL CITRATE PRN MCG: 50 INJECTION INTRAMUSCULAR; INTRAVENOUS at 04:50

## 2020-05-21 RX ADMIN — DEXMEDETOMIDINE HYDROCHLORIDE PRN MLS/HR: 100 INJECTION, SOLUTION, CONCENTRATE INTRAVENOUS at 18:45

## 2020-05-21 RX ADMIN — FENTANYL CITRATE PRN MCG: 50 INJECTION INTRAMUSCULAR; INTRAVENOUS at 09:08

## 2020-05-21 RX ADMIN — BACITRACIN SCH MLS/HR: 5000 INJECTION, POWDER, FOR SOLUTION INTRAMUSCULAR at 13:37

## 2020-05-21 NOTE — PDOC
PULMONARY PROGRESS NOTES


Subjective


Patient looks a lot better today sitting up in the chair utilizing Passy-[place] Horne 

valve able to phonate





Patient intubated on 3/23 , s/p trach 4/6,


Vitals





Vital Signs








  Date Time  Temp Pulse Resp B/P (MAP) Pulse Ox O2 Delivery O2 Flow Rate FiO2


 


5/21/20 11:00  108 28 154/78 (103) 96 Tracheal Collar 8.0 


 


5/21/20 08:00 98.1       





 98.1       








ROS:  No Chest Pain, No Abdominal Pain, No Increase Cough


General:  Alert, No acute distress


HEENT:  Other (nc at perrl     neck trach site ok  no lad  no thyromegaly)


Lungs:  Wheezing, Other (decrease bs)


Cardiovascular:  S1, S2


Abdomen:  Soft, Non-tender, Other (distended)


Neuro Exam:  Alert


Extremities:  Other (+3 generalized edema )


Skin:  Warm, Dry


Labs





Laboratory Tests








Test


 5/19/20


12:29 5/19/20


17:42 5/19/20


23:41 5/20/20


06:45


 


Glucose (Fingerstick)


 251 mg/dL


(70-99) 122 mg/dL


(70-99) 207 mg/dL


(70-99) 





 


Sodium Level


 


 


 


 140 mmol/L


(136-145)


 


Potassium Level


 


 


 


 3.9 mmol/L


(3.5-5.1)


 


Chloride Level


 


 


 


 99 mmol/L


()


 


Carbon Dioxide Level


 


 


 


 41 mmol/L


(21-32)


 


Anion Gap    0 (6-14) 


 


Blood Urea Nitrogen


 


 


 


 30 mg/dL


(7-20)


 


Creatinine


 


 


 


 0.7 mg/dL


(0.6-1.0)


 


Estimated GFR


(Cockcroft-Gault) 


 


 


 88.9 





 


Glucose Level


 


 


 


 147 mg/dL


(70-99)


 


Calcium Level


 


 


 


 8.8 mg/dL


(8.5-10.1)


 


Test


 5/20/20


06:53 5/20/20


09:30 5/20/20


12:31 5/20/20


16:28


 


Glucose (Fingerstick)


 146 mg/dL


(70-99) 


 258 mg/dL


(70-99) 141 mg/dL


(70-99)


 


O2 Saturation  97 % (92-99)   


 


Arterial Blood pH


 


 7.50


(7.35-7.45) 


 





 


Arterial Blood pCO2 at


Patient Temp 


 54 mmHg


(35-46) 


 





 


Arterial Blood pO2 at Patient


Temp 


 106 mmHg


() 


 





 


Arterial Blood HCO3


 


 41 mmol/L


(21-28) 


 





 


Arterial Blood Base Excess


 


 16 mmol/L


(-3-3) 


 





 


FiO2  30% trial   


 


Test


 5/21/20


00:44 5/21/20


06:10 5/21/20


06:22 





 


Glucose (Fingerstick)


 165 mg/dL


(70-99) 


 122 mg/dL


(70-99) 





 


White Blood Count


 


 8.3 x10^3/uL


(4.0-11.0) 


 





 


Red Blood Count


 


 2.72 x10^6/uL


(3.50-5.40) 


 





 


Hemoglobin


 


 7.8 g/dL


(12.0-15.5) 


 





 


Hematocrit


 


 23.8 %


(36.0-47.0) 


 





 


Mean Corpuscular Volume  87 fL ()   


 


Mean Corpuscular Hemoglobin  29 pg (25-35)   


 


Mean Corpuscular Hemoglobin


Concent 


 33 g/dL


(31-37) 


 





 


Red Cell Distribution Width


 


 18.2 %


(11.5-14.5) 


 





 


Platelet Count


 


 380 x10^3/uL


(140-400) 


 





 


Sodium Level


 


 139 mmol/L


(136-145) 


 





 


Potassium Level


 


 4.0 mmol/L


(3.5-5.1) 


 





 


Chloride Level


 


 100 mmol/L


() 


 





 


Carbon Dioxide Level


 


 39 mmol/L


(21-32) 


 





 


Anion Gap  0 (6-14)   


 


Blood Urea Nitrogen


 


 24 mg/dL


(7-20) 


 





 


Creatinine


 


 0.7 mg/dL


(0.6-1.0) 


 





 


Estimated GFR


(Cockcroft-Gault) 


 88.9 


 


 





 


Glucose Level


 


 118 mg/dL


(70-99) 


 





 


Calcium Level


 


 8.6 mg/dL


(8.5-10.1) 


 





 


Phosphorus Level


 


 3.0 mg/dL


(2.6-4.7) 


 





 


Magnesium Level


 


 1.9 mg/dL


(1.8-2.4) 


 











Laboratory Tests








Test


 5/20/20


12:31 5/20/20


16:28 5/21/20


00:44 5/21/20


06:10


 


Glucose (Fingerstick)


 258 mg/dL


(70-99) 141 mg/dL


(70-99) 165 mg/dL


(70-99) 





 


White Blood Count


 


 


 


 8.3 x10^3/uL


(4.0-11.0)


 


Red Blood Count


 


 


 


 2.72 x10^6/uL


(3.50-5.40)


 


Hemoglobin


 


 


 


 7.8 g/dL


(12.0-15.5)


 


Hematocrit


 


 


 


 23.8 %


(36.0-47.0)


 


Mean Corpuscular Volume    87 fL () 


 


Mean Corpuscular Hemoglobin    29 pg (25-35) 


 


Mean Corpuscular Hemoglobin


Concent 


 


 


 33 g/dL


(31-37)


 


Red Cell Distribution Width


 


 


 


 18.2 %


(11.5-14.5)


 


Platelet Count


 


 


 


 380 x10^3/uL


(140-400)


 


Sodium Level


 


 


 


 139 mmol/L


(136-145)


 


Potassium Level


 


 


 


 4.0 mmol/L


(3.5-5.1)


 


Chloride Level


 


 


 


 100 mmol/L


()


 


Carbon Dioxide Level


 


 


 


 39 mmol/L


(21-32)


 


Anion Gap    0 (6-14) 


 


Blood Urea Nitrogen


 


 


 


 24 mg/dL


(7-20)


 


Creatinine


 


 


 


 0.7 mg/dL


(0.6-1.0)


 


Estimated GFR


(Cockcroft-Gault) 


 


 


 88.9 





 


Glucose Level


 


 


 


 118 mg/dL


(70-99)


 


Calcium Level


 


 


 


 8.6 mg/dL


(8.5-10.1)


 


Phosphorus Level


 


 


 


 3.0 mg/dL


(2.6-4.7)


 


Magnesium Level


 


 


 


 1.9 mg/dL


(1.8-2.4)


 


Test


 5/21/20


06:22 


 


 





 


Glucose (Fingerstick)


 122 mg/dL


(70-99) 


 


 











Medications





Active Scripts








 Medications  Dose


 Route/Sig


 Max Daily Dose Days Date Category


 


 Bisoprolol


 Fumarate 5 Mg


 Tablet  10 Mg


 PO DAILY


   3/16/20 Reported








Comments


CXR  reviewed.





Impression


.


IMPRESSION:


1.  Acute hypoxemic respiratory failure secondary to ARDS status post trach,


2.  Gallstone pancreatitis


3.  Severe metabolic acidosis.stable


4.  Acute kidney injury-stable, Off HD-- continue to improve 


5.  Acute gallstone pancreatitis.


6.  Hypoalbuminemia.


7.  Moderate persistent effusions, s/p left thora  5/12


8.  Fever-  Per ID, per surgery--resolved 


9.  Chronic anemia


10. Covid 19 testing negative


11. Moderate to large ascites-S/P paracentisis


12.S/P paracentisis with 4 liters removed on 4/15/20


13. S/P IR drain placement on 5/8/2020





Plan


.


I think she should go outdoors as often as possible to get some fresh air





Continue the same





Lasix 40 mg twice daily





Patient placed on assist control last evening for respiratory distress


s/p  thoracentesis, 5/12, 3 litres removed


cap trach as tolerated


Follow surgery recs-- S/P 3 drain placed in IR on 5/8/2020


Follow ID recs for ABX


Follow nephrology recs 


Continue TPN 


DVT/GI PPX: heparin SQ/ protonix 


D/W RN and RT, pt





CODE:FULL











STEVE MIRANDA MD              May 21, 2020 11:22

## 2020-05-21 NOTE — NUR
Pharmacy TPN Dosing Note



S: JESENIA BEAN is a 49 year old F Currently receiving Central Continuous TPN started 
03/18/20



B:Pertinent PMH: 

Necrotizing pancreatitis

Height: 5 feet, 8 inches

Weight: 83.555997 kg



Current diet: NPO 



LABS:

Sodium:    139 

Potassium: 4 

Chloride:  100 

Calcium:   8.6 

Corrected Calcium: 10.04 

Magnesium: 1.9 

CO2:       39 

SCr:       0.7 

Glucose:   122-165 

Albumin:   2.2 

AST:       50 

ALT:       50 



TPN FORMULA:

TPN TYPE:  Central Continuous

AMINO ACIDS:         70 gm

DEXTROSE:            250 gm

LIPIDS:              30 gm

SODIUM CHLORIDE:     - mEq

SODIUM ACETATE:      - mEq

SODIUM PHOSPHATE:    - mmol

POTASSIUM CHLORIDE:  110 mEq

POTASSIUM ACETATE:   - mEq

POTASSIUM PHOSPHATE: - mmol

MAGNESIUM:           20 mEq

CALCIUM:             - mEq

INSULIN:             15 units

MULTIPLE VITAMIN:    10 ml

TRACE ELEMENTS:      1 ml(s)



TPN PLAN:  

Decreased AA per dietary rec. Added 20 meq KCl for slight drop in K, high

NG output. No other changes. Next labs Monday.





R: Continue TPN AS ABOVE.

Will monitor electrolytes, glucose, and tolerance to TPN.



 CANDACE BELTRE [facility], 05/21/20 1112

## 2020-05-21 NOTE — PDOC
Infectious Disease Note


Subjective:


Subjective


Patient comfortable, arousable,says feels okay


 off vent, trach o2





Vital Signs:


Vital Signs





Vital Signs








  Date Time  Temp Pulse Resp B/P (MAP) Pulse Ox O2 Delivery O2 Flow Rate FiO2


 


5/21/20 08:00     100 Tracheal Collar  


 


5/21/20 06:00  84 20 105/57 (73)   8.0 


 


5/21/20 04:00 98.5       





 98.5       











Physical Exam:


PHYSICAL EXAM


GENERAL: Severely encephalopathic trying to talk but has a lot of secretions and

is confused


HEENT:  oral cavity dry, NGT


NECK:  Trach with speaking valve


LUNGS: no rhonchi


HEART:  S1, S2, regular 


ABDOMEN: mod Distended, hypoactive BS, tender, + drainsx1


: [place] (4/14)


EXTREMITIES: Generalized edema, improving, no cyanosis, SCDs bilaterally 


SKIN: Warm and dry.  No generalized rash.  


CNS: Very weak 


PICC(4/30) clean





Medications:


Inpatient Meds:





Current Medications








 Medications


  (Trade)  Dose


 Ordered  Sig/Yee  Start Time


 Stop Time Status Last Admin


Dose Admin


 


 Acetaminophen


  (Tylenol Supp)  650 mg  PRN Q6HRS  PRN  3/24/20 10:30


    5/5/20 09:12


650 MG


 


 Acetaminophen


  (Tylenol)  650 mg  PRN Q6HRS  PRN  3/21/20 03:36


 5/13/20 10:25 DC 4/16/20 19:56


650 MG


 


 Albumin Human  100 ml @ 


 100 mls/hr  1X PRN  PRN  5/19/20 01:30


     





 


 Albuterol Sulfate


  (Ventolin Neb


 Soln)  2.5 mg  1X  ONCE  3/17/20 22:30


 3/17/20 22:31 DC 3/18/20 00:56


2.5 MG


 


 Alteplase,


 Recombinant


  (Cathflo For


 Central Catheter


 Clearance)  1 mg  1X  ONCE  4/24/20 10:45


 4/24/20 10:46 DC 4/24/20 11:44


1 MG


 


 Amino Acids/


 Glycerin/


 Electrolytes  1,000 ml @ 


 75 mls/hr  [id number]  4/20/20 21:15


   UNV  





 


 Artificial Tears


  (Artificial


 Tears)  1 drop  PRN Q15MIN  PRN  4/29/20 05:30


    5/18/20 08:08


1 DROP


 


 Atenolol


  (Tenormin)  100 mg  DAILY  3/17/20 09:00


 3/16/20 20:08 DC  





 


 Atropine Sulfate


  (ATROPINE 0.5mg


 SYRINGE)  0.5 mg  PRN Q5MIN  PRN  4/2/20 08:15


     





 


 Benzocaine


  (Hurricaine One)  1 spray  1X  ONCE  3/20/20 14:30


 3/20/20 14:31 DC 3/20/20 16:38


1 SPRAY


 


 Bisacodyl


  (Dulcolax Supp)  10 mg  STK-MED ONCE  4/27/20 10:59


 4/27/20 10:59 DC  





 


 Bumetanide


  (Bumex)  2 mg  DAILY  5/8/20 10:00


 5/18/20 17:15 DC 5/18/20 08:07


2 MG


 


 Bupivacaine HCl/


 Epinephrine Bitart


  (Sensorcain-Epi


 0.5%-1:099246 Mpf)  30 ml  STK-MED ONCE  4/27/20 10:58


 4/27/20 10:58 DC 4/27/20 12:01


7 ML


 


 Calcium Carbonate/


 Glycine


  (Tums)  500 mg  PRN [id number]  PRN  3/18/20 17:45


 5/13/20 10:25 DC  





 


 Calcium Chloride


 1000 mg/Sodium


 Chloride  110 ml @ 


 220 mls/hr  1X  ONCE  3/17/20 22:30


 3/17/20 22:59 DC 3/17/20 22:11


220 MLS/HR


 


 Calcium Chloride


 3000 mg/Sodium


 Chloride  1,030 ml @ 


 50 mls/hr  [id number]  3/19/20 08:00


 3/21/20 15:23 DC 3/21/20 02:17


50 MLS/HR


 


 Calcium Gluconate


  (Calcium


 Gluconate)  2,000 mg  1X  ONCE  3/19/20 02:15


 3/19/20 02:16 DC 3/19/20 02:19


2,000 MG


 


 Calcium Gluconate


 1000 mg/Sodium


 Chloride  110 ml @ 


 220 mls/hr  1X  ONCE  3/18/20 03:30


 3/18/20 03:59 DC 3/18/20 03:21


220 MLS/HR


 


 Calcium Gluconate


 2000 mg/Sodium


 Chloride  120 ml @ 


 220 mls/hr  1X  ONCE  3/18/20 07:30


 3/18/20 08:02 DC 3/18/20 09:05


220 MLS/HR


 


 Cefepime HCl


  (Maxipime)  2 gm  Q12HR  3/25/20 09:00


 4/8/20 09:58 DC 4/7/20 20:56


2 GM


 


 Cellulose


  (Surgicel


 Fibrillar 1x2)  1 each  STK-MED ONCE  4/6/20 11:00


 4/6/20 11:01 DC  





 


 Cellulose


  (Surgicel


 Hemostat 2x14)  1 each  STK-MED ONCE  4/27/20 10:58


 4/27/20 10:59 DC  





 


 Cellulose


  (Surgicel


 Hemostat 4x8)  1 each  STK-MED ONCE  4/27/20 10:58


 4/27/20 10:59 DC  





 


 Cyclobenzaprine


 HCl


  (Flexeril)  10 mg  PRN Q6HRS  PRN  4/30/20 10:45


     





 


 Daptomycin 430 mg/


 Sodium Chloride  50 ml @ 


 100 mls/hr  Q24H  4/25/20 13:00


 4/30/20 20:58 DC 4/30/20 13:00


100 MLS/HR


 


 Daptomycin 450 mg/


 Sodium Chloride  50 ml @ 


 100 mls/hr  Q24H  5/17/20 09:00


    5/20/20 09:25


100 MLS/HR


 


 Daptomycin 485 mg/


 Sodium Chloride  50 ml @ 


 100 mls/hr  Q24H  5/4/20 11:00


 5/12/20 07:44 DC 5/11/20 13:10


100 MLS/HR


 


 Daptomycin 500 mg/


 Sodium Chloride  50 ml @ 


 100 mls/hr  Q48H  3/25/20 08:30


 4/10/20 10:07 DC 4/10/20 09:57


100 MLS/HR


 


 Dexamethasone


 Sodium Phosphate


  (Decadron)  4 mg  STK-MED ONCE  4/27/20 10:56


 4/27/20 10:57 DC  





 


 Dexmedetomidine


 HCl 400 mcg/


 Sodium Chloride  100 ml @ 0


 mls/hr  CONT  PRN  4/2/20 08:15


    5/21/20 05:51


16.1 MLS/HR


 


 Dextrose


  (Dextrose


 50%-Water Syringe)  12.5 gm  PRN Q15MIN  PRN  3/16/20 09:30


     





 


 Digoxin


  (Lanoxin)  125 mcg  1X  ONCE  3/19/20 18:00


 3/19/20 18:01 DC 3/19/20 17:10


125 MCG


 


 Diphenhydramine


 HCl


  (Benadryl)  25 mg  1X PRN  PRN  4/24/20 15:45


 4/25/20 15:44 DC  





 


 Duloxetine HCl


  (Cymbalta)  30 mg  DAILY  5/10/20 14:00


 5/13/20 10:25 DC 5/11/20 09:48


30 MG


 


 Enoxaparin Sodium


  (Lovenox 100mg


 Syringe)  100 mg  Q12HR  4/21/20 21:00


   UNV  





 


 Enoxaparin Sodium


  (Lovenox 40mg


 Syringe)  40 mg  Q24H  5/17/20 17:00


    5/20/20 16:19


40 MG


 


 Etomidate


  (Amidate)  8 mg  1X  ONCE  3/23/20 08:30


 3/23/20 08:31 DC 3/23/20 08:33


8 MG


 


 Fentanyl Citrate


  (Fentanyl 2ml


 Vial)  25 mcg  PRN Q4HRS  PRN  5/18/20 13:15


    5/21/20 04:50


25 MCG


 


 Fentanyl Citrate


  (Fentanyl 5ml


 Vial)  250 mcg  1X  ONCE  5/8/20 09:15


 5/8/20 09:16 DC 5/8/20 09:30


50 MCG


 


 Furosemide


  (Lasix)  40 mg  [id number]  5/19/20 14:00


    5/21/20 08:23


40 MG


 


 Haloperidol


 Lactate


  (Haldol Inj)  3 mg  1X  ONCE  5/4/20 14:30


 5/4/20 14:31 DC 5/4/20 14:37


3 MG


 


 Heparin Sodium


  (Porcine)


  (Hep Lock Adult)  500 unit  STK-MED ONCE  4/7/20 09:29


 4/7/20 09:30 DC  





 


 Heparin Sodium


  (Porcine)


  (Heparin Sodium)  5,000 unit  Q12HR  4/27/20 21:00


 5/7/20 09:59 DC 5/6/20 20:57


5,000 UNIT


 


 Heparin Sodium


  (Porcine) 1000


 unit/Sodium


 Chloride  1,001 ml @ 


 1,001 mls/hr  1X  ONCE  4/27/20 12:00


 4/27/20 12:59 DC  





 


 Hydromorphone HCl


  (Dilaudid


 Standard PCA)  12 mg  STK-MED ONCE  5/1/20 15:50


 5/12/20 11:24 DC  





 


 Hydromorphone HCl


  (Dilaudid)  1 mg  PRN Q4HRS  PRN  5/4/20 19:00


 5/18/20 17:10 DC 5/18/20 06:25


1 MG


 


 Info


  (CONTRAST GIVEN


 -- Rx MONITORING)  1 each  PRN DAILY  PRN  3/30/20 11:45


 4/1/20 11:44 DC  





 


 Info


  (Icu Electrolyte


 Protocol)  1 ea  CONT PRN  PRN  3/29/20 13:15


     





 


 Info


  (PHARMACY


 MONITORING -- do


 not chart)  1 each  PRN DAILY  PRN  4/24/20 15:45


     





 


 Info


  (Tpn Per


 Pharmacy)  1 each  PRN DAILY  PRN  3/18/20 12:30


   UNV  





 


 Insulin Human


 Lispro


  (HumaLOG)  0-9 UNITS  Q6HRS  3/16/20 09:30


    5/21/20 00:46


4 UNITS


 


 Insulin Human


 Regular


  (HumuLIN R VIAL)  5 unit  1X  ONCE  3/17/20 22:30


 3/17/20 22:31 DC 3/17/20 22:14


5 UNIT


 


 Iohexol


  (Omnipaque 240


 Mg/ml)  30 ml  1X  ONCE  3/30/20 11:30


 3/30/20 11:33 DC 3/30/20 11:30


30 ML


 


 Iohexol


  (Omnipaque 300


 Mg/ml)  50 ml  STK-MED ONCE  4/27/20 10:58


 4/27/20 10:59 DC  





 


 Iohexol


  (Omnipaque 350


 Mg/ml)  90 ml  1X  ONCE  3/16/20 03:30


 3/16/20 03:31 DC 3/16/20 03:25


90 ML


 


 Ketorolac


 Tromethamine


  (Toradol 30mg


 Vial)  30 mg  1X  ONCE  3/16/20 03:00


 3/16/20 03:01 DC 3/16/20 02:54


30 MG


 


 Lidocaine HCl


  (Buffered


 Lidocaine 1%)  6 ml  1X  ONCE  5/12/20 14:15


 5/12/20 14:16 DC 5/12/20 14:15


3 ML


 


 Lidocaine HCl


  (Glydo


  (Lidocaine) Jelly)  1 thomas  1X  ONCE  3/20/20 14:30


 3/20/20 14:31 DC 3/20/20 16:38


1 THOMAS


 


 Lidocaine HCl


  (Xylocaine-Mpf


 1% 2ml Vial)  2 ml  PRN 1X  PRN  4/27/20 07:00


 4/28/20 06:59 DC  





 


 Linezolid/Dextrose  300 ml @ 


 300 mls/hr  Q12HR  5/17/20 09:00


 5/20/20 08:11 DC 5/19/20 21:08


300 MLS/HR


 


 Lorazepam


  (Ativan Inj)  2 mg  PRN Q4HRS  PRN  5/17/20 19:15


    5/18/20 22:20


2 MG


 


 Magnesium Sulfate  50 ml @ 25


 mls/hr  1X  ONCE  5/10/20 09:00


 5/10/20 10:59 DC 5/10/20 10:44


25 MLS/HR


 


 Meropenem 1 gm/


 Sodium Chloride  100 ml @ 


 200 mls/hr  Q8HRS  4/28/20 14:00


    5/21/20 05:48


200 MLS/HR


 


 Meropenem 500 mg/


 Sodium Chloride  50 ml @ 


 100 mls/hr  Q12H  4/8/20 10:00


 4/28/20 12:37 DC 4/28/20 10:45


100 MLS/HR


 


 Metoclopramide HCl


  (Reglan Vial)  10 mg  PRN Q3HRS  PRN  5/9/20 16:45


    5/14/20 04:25


10 MG


 


 Metoprolol


 Tartrate


  (Lopressor Vial)  5 mg  1X  ONCE  5/17/20 15:15


 5/17/20 15:16 DC 5/17/20 15:31


5 MG


 


 Metronidazole  100 ml @ 


 100 mls/hr  Q8HRS  4/14/20 10:00


 4/21/20 08:10 DC 4/21/20 06:04


100 MLS/HR


 


 Micafungin Sodium


 100 mg/Dextrose  100 ml @ 


 100 mls/hr  Q24H  5/4/20 11:00


    5/20/20 12:40


100 MLS/HR


 


 Midazolam HCl


  (Versed)  5 mg  1X  ONCE  5/8/20 09:15


 5/8/20 09:16 DC 5/8/20 09:30


1 MG


 


 Midazolam HCl 100


 mg/Sodium Chloride  100 ml @ 7


 mls/hr  CONT  PRN  3/28/20 16:00


    4/8/20 15:35


7 MLS/HR


 


 Midazolam HCl 50


 mg/Sodium Chloride  50 ml @ 0


 mls/hr  CONT  PRN  3/23/20 08:15


 3/28/20 15:59 DC 3/26/20 22:39


7 MLS/HR


 


 Morphine Sulfate


  (Morphine


 Sulfate)  2 mg  PRN Q2HR  PRN  3/16/20 05:00


 3/17/20 14:15 DC 3/17/20 12:26


2 MG


 


 Multi-Ingred


 Cream/Lotion/Oil/


 Oint


  (Artificial


 Tears Eye


 Ointment)  1 thomas  PRN Q1HR  PRN  3/25/20 17:30


    4/13/20 08:19


1 THOMAS


 


 Naloxone HCl


  (Narcan)  0.4 mg  PRN Q2MIN  PRN  4/27/20 14:30


   UNV  





 


 Norepinephrine


 Bitartrate 8 mg/


 Dextrose  258 ml @ 


 17.299 mls/


 hr  CONT  PRN  3/17/20 15:30


 4/17/20 09:19 DC 4/14/20 12:48


20.9 MLS/HR


 


 Ondansetron HCl


  (Zofran)  4 mg  STK-MED ONCE  4/27/20 10:56


 4/27/20 10:57 DC  





 


 Pantoprazole


 Sodium


  (PROTONIX VIAL


 for IV PUSH)  40 mg  DAILYAC  3/16/20 11:30


    5/21/20 08:22


40 MG


 


 Phenylephrine HCl


  (PHENYLEPHRINE


 in 0.9% NACL PF)  1 mg  STK-MED ONCE  4/27/20 12:34


 4/27/20 12:34 DC  





 


 Piperacillin Sod/


 Tazobactam Sod


 4.5 gm/Sodium


 Chloride  100 ml @ 


 200 mls/hr  1X  ONCE  3/16/20 06:00


 3/16/20 06:29 DC 3/16/20 05:44


200 MLS/HR


 


 Potassium


 Chloride 15 meq/


 Bicarbonate


 Dialysis Soln w/


 out KCl  5,007.5 ml


  @ 1,000 mls/


 hr  Q5H1M  3/29/20 20:00


 4/2/20 13:08 DC 4/1/20 18:14


1,000 MLS/HR


 


 Potassium


 Chloride 20 meq/


 Bicarbonate


 Dialysis Soln w/


 out KCl  5,010 ml @ 


 1,000 mls/hr  Q5H1M  3/25/20 16:00


 3/29/20 19:59 DC 3/29/20 14:54


1,000 MLS/HR


 


 Potassium


 Chloride 75 meq/


 Magnesium Sulfate


 15 meq/


 Multivitamins 10


 ml/Chromium/


 Copper/Manganese/


 Seleni/Zn 0.5 ml/


 Insulin Human


 Regular 15 unit/


 Total Parenteral


 Nutrition/Amino


 Acids/Dextrose/


 Fat Emulsion


 Intravenous  1,920 ml @ 


 80 mls/hr  TPN  CONT  5/9/20 22:00


 5/10/20 21:59 DC 5/9/20 22:41


80 MLS/HR


 


 Potassium


 Chloride 75 meq/


 Magnesium Sulfate


 15 meq/Calcium


 Gluconate 8 meq/


 Multivitamins 10


 ml/Chromium/


 Copper/Manganese/


 Seleni/Zn 0.5 ml/


 Insulin Human


 Regular 15 unit/


 Total Parenteral


 Nutrition/Amino


 Acids/Dextrose/


 Fat Emulsion


 Intravenous  1,920 ml @ 


 80 mls/hr  TPN  CONT  5/7/20 22:00


 5/8/20 21:59 DC 5/7/20 22:28


80 MLS/HR


 


 Potassium


 Chloride 75 meq/


 Magnesium Sulfate


 15 meq/Calcium


 Gluconate 8 meq/


 Multivitamins 10


 ml/Chromium/


 Copper/Manganese/


 Seleni/Zn 0.5 ml/


 Insulin Human


 Regular 20 unit/


 Total Parenteral


 Nutrition/Amino


 Acids/Dextrose/


 Fat Emulsion


 Intravenous  1,920 ml @ 


 80 mls/hr  TPN  CONT  5/6/20 22:00


 5/7/20 21:59 DC 5/6/20 22:00


80 MLS/HR


 


 Potassium


 Chloride 75 meq/


 Magnesium Sulfate


 15 meq/Calcium


 Gluconate 8 meq/


 Multivitamins 10


 ml/Chromium/


 Copper/Manganese/


 Seleni/Zn 0.5 ml/


 Insulin Human


 Regular 25 unit/


 Total Parenteral


 Nutrition/Amino


 Acids/Dextrose/


 Fat Emulsion


 Intravenous  1,920 ml @ 


 80 mls/hr  TPN  CONT  5/4/20 22:00


 5/5/20 21:59 DC 5/4/20 23:08


80 MLS/HR


 


 Potassium


 Chloride 75 meq/


 Magnesium Sulfate


 20 meq/Calcium


 Gluconate 10 meq/


 Multivitamins 10


 ml/Chromium/


 Copper/Manganese/


 Seleni/Zn 0.5 ml/


 Insulin Human


 Regular 25 unit/


 Total Parenteral


 Nutrition/Amino


 Acids/Dextrose/


 Fat Emulsion


 Intravenous  1,920 ml @ 


 80 mls/hr  TPN  CONT  5/3/20 22:00


 5/4/20 21:59 DC 5/3/20 22:04


80 MLS/HR


 


 Potassium


 Chloride 75 meq/


 Magnesium Sulfate


 20 meq/Calcium


 Gluconate 10 meq/


 Multivitamins 10


 ml/Chromium/


 Copper/Manganese/


 Seleni/Zn 0.5 ml/


 Insulin Human


 Regular 30 unit/


 Total Parenteral


 Nutrition/Amino


 Acids/Dextrose/


 Fat Emulsion


 Intravenous  1,920 ml @ 


 80 mls/hr  TPN  CONT  5/2/20 22:00


 5/3/20 22:00 DC 5/2/20 21:51


80 MLS/HR


 


 Potassium


 Chloride 80 meq/


 Magnesium Sulfate


 20 meq/


 Multivitamins 10


 ml/Chromium/


 Copper/Manganese/


 Seleni/Zn 0.5 ml/


 Insulin Human


 Regular 15 unit/


 Total Parenteral


 Nutrition/Amino


 Acids/Dextrose/


 Fat Emulsion


 Intravenous  1,920 ml @ 


 80 mls/hr  TPN  CONT  5/12/20 22:00


 5/13/20 21:59 DC 5/12/20 21:40


80 MLS/HR


 


 Potassium


 Chloride 80 meq/


 Magnesium Sulfate


 20 meq/


 Multivitamins 10


 ml/Chromium/


 Copper/Manganese/


 Seleni/Zn 1 ml/


 Insulin Human


 Regular 15 unit/


 Total Parenteral


 Nutrition/Amino


 Acids/Dextrose/


 Fat Emulsion


 Intravenous  1,920 ml @ 


 80 mls/hr  TPN  CONT  5/13/20 22:00


 5/14/20 21:59 DC 5/13/20 22:04


80 MLS/HR


 


 Potassium


 Chloride 90 meq/


 Magnesium Sulfate


 20 meq/


 Multivitamins 10


 ml/Chromium/


 Copper/Manganese/


 Seleni/Zn 1 ml/


 Insulin Human


 Regular 15 unit/


 Total Parenteral


 Nutrition/Amino


 Acids/Dextrose/


 Fat Emulsion


 Intravenous  1,800 ml @ 


 75 mls/hr  TPN  CONT  5/20/20 22:00


 5/21/20 21:59  5/20/20 22:28


75 MLS/HR


 


 Potassium


 Chloride/Water  100 ml @ 


 100 mls/hr  1X  ONCE  5/14/20 11:00


 5/14/20 11:59 DC 5/14/20 11:34


100 MLS/HR


 


 Potassium


 Phosphate 20 mmol/


 Sodium Chloride  106.6667


 ml @ 


 51.667 m...  1X  ONCE  3/25/20 13:00


 3/25/20 15:03 DC 3/25/20 12:51


51.667 MLS/HR


 


 Potassium Acetate


 30 meq/Magnesium


 Sulfate 20 meq/


 Calcium Gluconate


 10 meq/


 Multivitamins 10


 ml/Chromium/


 Copper/Manganese/


 Seleni/Zn 0.5 ml/


 Insulin Human


 Regular 30 unit/


 Potassium


 Chloride 30 meq/


 Total Parenteral


 Nutrition/Amino


 Acids/Dextrose/


 Fat Emulsion


 Intravenous  1,920 ml @ 


 80 mls/hr  TPN  CONT  5/1/20 22:00


 5/2/20 21:59 DC 5/1/20 22:34


80 MLS/HR


 


 Potassium Acetate


 55 meq/Magnesium


 Sulfate 20 meq/


 Calcium Gluconate


 10 meq/


 Multivitamins 10


 ml/Chromium/


 Copper/Manganese/


 Seleni/Zn 0.5 ml/


 Insulin Human


 Regular 30 unit/


 Total Parenteral


 Nutrition/Amino


 Acids/Dextrose/


 Fat Emulsion


 Intravenous  1,920 ml @ 


 80 mls/hr  TPN  CONT  4/30/20 22:00


 5/1/20 21:59 DC 5/1/20 01:00


80 MLS/HR


 


 Potassium Acetate


 55 meq/Magnesium


 Sulfate 20 meq/


 Calcium Gluconate


 10 meq/


 Multivitamins 10


 ml/Chromium/


 Copper/Manganese/


 Seleni/Zn 0.5 ml/


 Insulin Human


 Regular 35 unit/


 Total Parenteral


 Nutrition/Amino


 Acids/Dextrose/


 Fat Emulsion


 Intravenous  1,920 ml @ 


 80 mls/hr  TPN  CONT  4/28/20 22:00


 4/29/20 21:59 DC 4/28/20 22:02


80 MLS/HR


 


 Potassium Acetate


 65 meq/Magnesium


 Sulfate 20 meq/


 Calcium Gluconate


 10 meq/


 Multivitamins 10


 ml/Chromium/


 Copper/Manganese/


 Seleni/Zn 0.5 ml/


 Insulin Human


 Regular 30 unit/


 Total Parenteral


 Nutrition/Amino


 Acids/Dextrose/


 Fat Emulsion


 Intravenous  1,920 ml @ 


 80 mls/hr  TPN  CONT  4/29/20 22:00


 4/30/20 21:59 DC 4/29/20 22:22


80 MLS/HR


 


 Prochlorperazine


 Edisylate


  (Compazine)  5 mg  PACU PRN  PRN  4/27/20 07:00


 4/28/20 06:59 DC  





 


 Propofol  20 ml @ As


 Directed  STK-MED ONCE  4/27/20 12:26


 4/27/20 12:27 DC  





 


 Ringer's Solution  1,000 ml @ 


 30 mls/hr  Q24H  4/27/20 07:00


 4/27/20 18:59 DC  





 


 Rocuronium Bromide


  (Zemuron)  50 mg  STK-MED ONCE  4/27/20 10:56


 4/27/20 10:57 DC  





 


 Sevoflurane


  (Ultane)  60 ml  STK-MED ONCE  4/27/20 12:26


 4/27/20 12:27 DC  





 


 Sodium


 Bicarbonate 50


 meq/Sodium


 Chloride  1,050 ml @ 


 75 mls/hr  Q14H  3/18/20 07:30


 3/23/20 10:28 DC 3/22/20 21:10


75 MLS/HR


 


 Sodium Acetate 50


 meq/Potassium


 Acetate 55 meq/


 Magnesium Sulfate


 20 meq/Calcium


 Gluconate 10 meq/


 Multivitamins 10


 ml/Chromium/


 Copper/Manganese/


 Seleni/Zn 0.5 ml/


 Insulin Human


 Regular 35 unit/


 Total Parenteral


 Nutrition/Amino


 Acids/Dextrose/


 Fat Emulsion


 Intravenous  1,800 ml @ 


 75 mls/hr  TPN  CONT  4/25/20 22:00


 4/26/20 21:59 DC 4/25/20 22:03


75 MLS/HR


 


 Sodium Chloride  1,000 ml @ 


 25 mls/hr  Q24H  4/27/20 14:30


   UNV  





 


 Sodium Chloride


  (Normal Saline


 Flush)  3 ml  QSHIFT  PRN  4/27/20 13:45


     





 


 Sodium Chloride


 90 meq/Calcium


 Gluconate 10 meq/


 Multivitamins 10


 ml/Chromium/


 Copper/Manganese/


 Seleni/Zn 0.5 ml/


 Total Parenteral


 Nutrition/Amino


 Acids/Dextrose/


 Fat Emulsion


 Intravenous  1,512 ml @ 


 63 mls/hr  TPN  CONT  3/18/20 22:00


 3/19/20 21:59 DC 3/18/20 22:06


63 MLS/HR


 


 Sodium Chloride


 90 meq/Calcium


 Gluconate 10 meq/


 Multivitamins 10


 ml/Chromium/


 Copper/Manganese/


 Seleni/Zn 1 ml/


 Total Parenteral


 Nutrition/Amino


 Acids/Dextrose/


 Fat Emulsion


 Intravenous  55.005 ml


  @ 2.292


 mls/hr  TPN  CONT  3/18/20 22:00


 3/18/20 12:33 DC  





 


 Sodium Chloride


 90 meq/Magnesium


 Sulfate 10 meq/


 Calcium Gluconate


 20 meq/


 Multivitamins 10


 ml/Chromium/


 Copper/Manganese/


 Seleni/Zn 0.5 ml/


 Total Parenteral


 Nutrition/Amino


 Acids/Dextrose/


 Fat Emulsion


 Intravenous  1,512 ml @ 


 63 mls/hr  TPN  CONT  3/19/20 22:00


 3/20/20 21:59 DC 3/19/20 22:25


63 MLS/HR


 


 Sodium Chloride


 90 meq/Magnesium


 Sulfate 12 meq/


 Calcium Gluconate


 15 meq/


 Multivitamins 10


 ml/Chromium/


 Copper/Manganese/


 Seleni/Zn 0.5 ml/


 Insulin Human


 Regular 25 unit/


 Total Parenteral


 Nutrition/Amino


 Acids/Dextrose/


 Fat Emulsion


 Intravenous  1,400 ml @ 


 58.333 mls/


 hr  TPN  CONT  4/8/20 22:00


 4/9/20 21:59 DC 4/8/20 21:41


58.333 MLS/HR


 


 Sodium Chloride


 90 meq/Potassium


 Chloride 15 meq/


 Magnesium Sulfate


 12 meq/Calcium


 Gluconate 15 meq/


 Multivitamins 10


 ml/Chromium/


 Copper/Manganese/


 Seleni/Zn 0.5 ml/


 Insulin Human


 Regular 25 unit/


 Total Parenteral


 Nutrition/Amino


 Acids/Dextrose/


 Fat Emulsion


 Intravenous  1,400 ml @ 


 58.333 mls/


 hr  TPN  CONT  4/7/20 22:00


 4/8/20 21:59 DC 4/7/20 22:13


58.333 MLS/HR


 


 Sodium Chloride


 90 meq/Potassium


 Chloride 15 meq/


 Potassium


 Phosphate 10 mmol/


 Magnesium Sulfate


 8 meq/Calcium


 Gluconate 15 meq/


 Multivitamins 10


 ml/Chromium/


 Copper/Manganese/


 Seleni/Zn 0.5 ml/


 Insulin Human


 Regular 25 unit/


 Total Parenteral


 Nutrition/Amino


 Acids/Dextrose/


 Fat Emulsion


 Intravenous  1,400 ml @ 


 58.333 mls/


 hr  TPN  CONT  4/5/20 22:00


 4/6/20 21:59 DC 4/5/20 21:20


58.333 MLS/HR


 


 Sodium Chloride


 90 meq/Potassium


 Chloride 15 meq/


 Potassium


 Phosphate 10 mmol/


 Magnesium Sulfate


 10 meq/Calcium


 Gluconate 20 meq/


 Multivitamins 10


 ml/Chromium/


 Copper/Manganese/


 Seleni/Zn 0.5 ml/


 Total Parenteral


 Nutrition/Amino


 Acids/Dextrose/


 Fat Emulsion


 Intravenous  1,400 ml @ 


 58.333 mls/


 hr  TPN  CONT  3/23/20 22:00


 3/24/20 21:59 DC 3/23/20 21:42


58.333 MLS/HR


 


 Sodium Chloride


 90 meq/Potassium


 Chloride 15 meq/


 Potassium


 Phosphate 10 mmol/


 Magnesium Sulfate


 12 meq/Calcium


 Gluconate 15 meq/


 Multivitamins 10


 ml/Chromium/


 Copper/Manganese/


 Seleni/Zn 0.5 ml/


 Insulin Human


 Regular 25 unit/


 Total Parenteral


 Nutrition/Amino


 Acids/Dextrose/


 Fat Emulsion


 Intravenous  1,400 ml @ 


 58.333 mls/


 hr  TPN  CONT  4/6/20 22:00


 4/7/20 21:59 DC 4/6/20 22:24


58.333 MLS/HR


 


 Sodium Chloride


 90 meq/Potassium


 Chloride 15 meq/


 Potassium


 Phosphate 15 mmol/


 Magnesium Sulfate


 10 meq/Calcium


 Gluconate 15 meq/


 Multivitamins 10


 ml/Chromium/


 Copper/Manganese/


 Seleni/Zn 0.5 ml/


 Total Parenteral


 Nutrition/Amino


 Acids/Dextrose/


 Fat Emulsion


 Intravenous  1,400 ml @ 


 58.333 mls/


 hr  TPN  CONT  3/24/20 22:00


 3/25/20 21:59 DC 3/24/20 22:17


58.333 MLS/HR


 


 Sodium Chloride


 90 meq/Potassium


 Chloride 15 meq/


 Potassium


 Phosphate 15 mmol/


 Magnesium Sulfate


 10 meq/Calcium


 Gluconate 20 meq/


 Multivitamins 10


 ml/Chromium/


 Copper/Manganese/


 Seleni/Zn 0.5 ml/


 Total Parenteral


 Nutrition/Amino


 Acids/Dextrose/


 Fat Emulsion


 Intravenous  1,200 ml @ 


 50 mls/hr  TPN  CONT  3/22/20 22:00


 3/22/20 14:17 DC  





 


 Sodium Chloride


 90 meq/Potassium


 Chloride 15 meq/


 Potassium


 Phosphate 18 mmol/


 Magnesium Sulfate


 8 meq/Calcium


 Gluconate 15 meq/


 Multivitamins 10


 ml/Chromium/


 Copper/Manganese/


 Seleni/Zn 0.5 ml/


 Insulin Human


 Regular 10 unit/


 Total Parenteral


 Nutrition/Amino


 Acids/Dextrose/


 Fat Emulsion


 Intravenous  1,400 ml @ 


 58.333 mls/


 hr  TPN  CONT  3/27/20 22:00


 3/28/20 21:59 DC 3/27/20 21:43


58.333 MLS/HR


 


 Sodium Chloride


 90 meq/Potassium


 Chloride 15 meq/


 Potassium


 Phosphate 18 mmol/


 Magnesium Sulfate


 8 meq/Calcium


 Gluconate 15 meq/


 Multivitamins 10


 ml/Chromium/


 Copper/Manganese/


 Seleni/Zn 0.5 ml/


 Insulin Human


 Regular 15 unit/


 Total Parenteral


 Nutrition/Amino


 Acids/Dextrose/


 Fat Emulsion


 Intravenous  1,400 ml @ 


 58.333 mls/


 hr  TPN  CONT  3/30/20 22:00


 3/31/20 21:59 DC 3/30/20 21:47


58.333 MLS/HR


 


 Sodium Chloride


 90 meq/Potassium


 Chloride 15 meq/


 Potassium


 Phosphate 18 mmol/


 Magnesium Sulfate


 8 meq/Calcium


 Gluconate 15 meq/


 Multivitamins 10


 ml/Chromium/


 Copper/Manganese/


 Seleni/Zn 0.5 ml/


 Insulin Human


 Regular 20 unit/


 Total Parenteral


 Nutrition/Amino


 Acids/Dextrose/


 Fat Emulsion


 Intravenous  1,400 ml @ 


 58.333 mls/


 hr  TPN  CONT  4/2/20 22:00


 4/3/20 21:59 DC 4/2/20 22:45


58.333 MLS/HR


 


 Sodium Chloride


 90 meq/Potassium


 Chloride 15 meq/


 Potassium


 Phosphate 18 mmol/


 Magnesium Sulfate


 8 meq/Calcium


 Gluconate 15 meq/


 Multivitamins 10


 ml/Chromium/


 Copper/Manganese/


 Seleni/Zn 0.5 ml/


 Total Parenteral


 Nutrition/Amino


 Acids/Dextrose/


 Fat Emulsion


 Intravenous  1,400 ml @ 


 58.333 mls/


 hr  TPN  CONT  3/26/20 22:00


 3/27/20 21:59 DC 3/26/20 22:00


58.333 MLS/HR


 


 Sodium Chloride


 90 meq/Potassium


 Phosphate 15 mmol/


 Magnesium Sulfate


 12 meq/Calcium


 Gluconate 15 meq/


 Multivitamins 10


 ml/Chromium/


 Copper/Manganese/


 Seleni/Zn 0.5 ml/


 Insulin Human


 Regular 30 unit/


 Total Parenteral


 Nutrition/Amino


 Acids/Dextrose/


 Fat Emulsion


 Intravenous  1,400 ml @ 


 58.333 mls/


 hr  TPN  CONT  4/10/20 22:00


 4/11/20 21:59 DC 4/10/20 21:49


58.333 MLS/HR


 


 Sodium Chloride


 90 meq/Potassium


 Phosphate 15 mmol/


 Magnesium Sulfate


 12 meq/Calcium


 Gluconate 15 meq/


 Multivitamins 10


 ml/Chromium/


 Copper/Manganese/


 Seleni/Zn 0.5 ml/


 Insulin Human


 Regular 40 unit/


 Total Parenteral


 Nutrition/Amino


 Acids/Dextrose/


 Fat Emulsion


 Intravenous  1,400 ml @ 


 58.333 mls/


 hr  TPN  CONT  4/11/20 22:00


 4/12/20 21:59 DC 4/11/20 21:21


58.333 MLS/HR


 


 Sodium Chloride


 90 meq/Potassium


 Phosphate 19 mmol/


 Magnesium Sulfate


 12 meq/Calcium


 Gluconate 15 meq/


 Multivitamins 10


 ml/Chromium/


 Copper/Manganese/


 Seleni/Zn 0.5 ml/


 Insulin Human


 Regular 40 unit/


 Total Parenteral


 Nutrition/Amino


 Acids/Dextrose/


 Fat Emulsion


 Intravenous  1,400 ml @ 


 58.333 mls/


 hr  TPN  CONT  4/12/20 22:00


 4/13/20 21:59 DC 4/12/20 21:54


58.333 MLS/HR


 


 Sodium Chloride


 90 meq/Potassium


 Phosphate 5 mmol/


 Magnesium Sulfate


 12 meq/Calcium


 Gluconate 15 meq/


 Multivitamins 10


 ml/Chromium/


 Copper/Manganese/


 Seleni/Zn 0.5 ml/


 Insulin Human


 Regular 30 unit/


 Total Parenteral


 Nutrition/Amino


 Acids/Dextrose/


 Fat Emulsion


 Intravenous  1,400 ml @ 


 58.333 mls/


 hr  TPN  CONT  4/9/20 22:00


 4/10/20 21:59 DC 4/9/20 22:08


58.333 MLS/HR


 


 Sodium Chloride


 100 meq/Potassium


 Chloride 40 meq/


 Magnesium Sulfate


 15 meq/Calcium


 Gluconate 15 meq/


 Multivitamins 10


 ml/Chromium/


 Copper/Manganese/


 Seleni/Zn 0.5 ml/


 Insulin Human


 Regular 35 unit/


 Total Parenteral


 Nutrition/Amino


 Acids/Dextrose/


 Fat Emulsion


 Intravenous  1,400 ml @ 


 58.333 mls/


 hr  TPN  CONT  4/19/20 22:00


 4/20/20 21:59 DC 4/19/20 22:46


58.333 MLS/HR


 


 Sodium Chloride


 100 meq/Potassium


 Chloride 40 meq/


 Magnesium Sulfate


 20 meq/Calcium


 Gluconate 10 meq/


 Multivitamins 10


 ml/Chromium/


 Copper/Manganese/


 Seleni/Zn 0.5 ml/


 Insulin Human


 Regular 35 unit/


 Total Parenteral


 Nutrition/Amino


 Acids/Dextrose/


 Fat Emulsion


 Intravenous  1,400 ml @ 


 58.333 mls/


 hr  TPN  CONT  4/23/20 22:00


 4/24/20 21:59 DC 4/24/20 00:06


58.333 MLS/HR


 


 Sodium Chloride


 100 meq/Potassium


 Chloride 40 meq/


 Magnesium Sulfate


 20 meq/Calcium


 Gluconate 15 meq/


 Multivitamins 10


 ml/Chromium/


 Copper/Manganese/


 Seleni/Zn 0.5 ml/


 Insulin Human


 Regular 35 unit/


 Total Parenteral


 Nutrition/Amino


 Acids/Dextrose/


 Fat Emulsion


 Intravenous  1,400 ml @ 


 58.333 mls/


 hr  TPN  CONT  4/22/20 22:00


 4/23/20 21:59 DC 4/22/20 22:27


58.333 MLS/HR


 


 Sodium Chloride


 100 meq/Potassium


 Phosphate 10 mmol/


 Magnesium Sulfate


 12 meq/Calcium


 Gluconate 15 meq/


 Multivitamins 10


 ml/Chromium/


 Copper/Manganese/


 Seleni/Zn 0.5 ml/


 Insulin Human


 Regular 35 unit/


 Potassium


 Chloride 20 meq/


 Total Parenteral


 Nutrition/Amino


 Acids/Dextrose/


 Fat Emulsion


 Intravenous  1,400 ml @ 


 58.333 mls/


 hr  TPN  CONT  4/16/20 22:00


 4/17/20 21:59 DC 4/16/20 22:10


58.333 MLS/HR


 


 Sodium Chloride


 100 meq/Potassium


 Phosphate 19 mmol/


 Magnesium Sulfate


 12 meq/Calcium


 Gluconate 15 meq/


 Multivitamins 10


 ml/Chromium/


 Copper/Manganese/


 Seleni/Zn 0.5 ml/


 Insulin Human


 Regular 40 unit/


 Potassium


 Chloride 20 meq/


 Total Parenteral


 Nutrition/Amino


 Acids/Dextrose/


 Fat Emulsion


 Intravenous  1,400 ml @ 


 58.333 mls/


 hr  TPN  CONT  4/15/20 22:00


 4/16/20 21:59 DC 4/15/20 21:20


58.333 MLS/HR


 


 Sodium Chloride


 100 meq/Potassium


 Phosphate 5 mmol/


 Magnesium Sulfate


 12 meq/Calcium


 Gluconate 15 meq/


 Multivitamins 10


 ml/Chromium/


 Copper/Manganese/


 Seleni/Zn 0.5 ml/


 Insulin Human


 Regular 35 unit/


 Potassium


 Chloride 20 meq/


 Total Parenteral


 Nutrition/Amino


 Acids/Dextrose/


 Fat Emulsion


 Intravenous  1,400 ml @ 


 58.333 mls/


 hr  TPN  CONT  4/17/20 22:00


 4/18/20 21:59 DC 4/17/20 22:59


58.333 MLS/HR


 


 Succinylcholine


 Chloride


  (Anectine)  120 mg  1X  ONCE  3/23/20 08:30


 3/23/20 08:31 DC 3/23/20 08:34


120 MG


 


 Vecuronium Bromide


  (Norcuron Bolus)  6 mg  PRN Q6HRS  PRN  5/7/20 19:15


 5/7/20 19:35 DC  














Labs:


Lab





Laboratory Tests








Test


 5/20/20


09:30 5/20/20


12:31 5/20/20


16:28 5/21/20


00:44


 


O2 Saturation 97 % (92-99)    


 


Arterial Blood pH


 7.50


(7.35-7.45) 


 


 





 


Arterial Blood pCO2 at


Patient Temp 54 mmHg


(35-46) 


 


 





 


Arterial Blood pO2 at Patient


Temp 106 mmHg


() 


 


 





 


Arterial Blood HCO3


 41 mmol/L


(21-28) 


 


 





 


Arterial Blood Base Excess


 16 mmol/L


(-3-3) 


 


 





 


FiO2 30% trial    


 


Glucose (Fingerstick)


 


 258 mg/dL


(70-99) 141 mg/dL


(70-99) 165 mg/dL


(70-99)


 


Test


 5/21/20


06:10 5/21/20


06:22 


 





 


Sodium Level


 139 mmol/L


(136-145) 


 


 





 


Potassium Level


 4.0 mmol/L


(3.5-5.1) 


 


 





 


Chloride Level


 100 mmol/L


() 


 


 





 


Carbon Dioxide Level


 39 mmol/L


(21-32) 


 


 





 


Anion Gap 0 (6-14)    


 


Blood Urea Nitrogen


 24 mg/dL


(7-20) 


 


 





 


Creatinine


 0.7 mg/dL


(0.6-1.0) 


 


 





 


Estimated GFR


(Cockcroft-Gault) 88.9 


 


 


 





 


Glucose Level


 118 mg/dL


(70-99) 


 


 





 


Calcium Level


 8.6 mg/dL


(8.5-10.1) 


 


 





 


Phosphorus Level


 3.0 mg/dL


(2.6-4.7) 


 


 





 


Magnesium Level


 1.9 mg/dL


(1.8-2.4) 


 


 





 


Glucose (Fingerstick)


 


 122 mg/dL


(70-99) 


 














Objective:


Assessment:


Fever intermittent could be from underlying pancreatitis,  


Acute pancreatitis with persistent  necrosis


CT a/p 4/9


    Increased ascites. Persistent evidence of necrotizing pancreatitis with 

fluid and phlegmon


   at the pancreas


   4/27 status post ROBERT drain placement; 


   5/12 candida parapsilosis


Cholelithiasis with thickening of the gallbladder wall.


Leucocytosis 


JED,Hyperkalemia, Metabolic acidosis off dialysis


Acute hypoxic resp failure ,bilateral pleural effusion and atelectasis


hypocalcemia 


Prediabetes


HTN


s/p trach





Plan:


Plan of Care


  


cont merrem 4/8


dc dapto


cont  Micafungin


Off zyvox


f/u cultures ,BC neg 5/17


Maintain aspiration precaution


Supportive care











IVAN FRANZ MD           May 21, 2020 08:30

## 2020-05-21 NOTE — PDOC
PROGRESS NOTES


Chief Complaint


Chief Complaint


Acute hypoxic Respiratory failure requiring mechanical ventilation (now 

extubated for several days but still with tracheostomy)


Tracheostomy


bilateral pleural effusions/pulm edema 


Sepsis


Severe Acute gallstone pancreatitis (not a surgical candidate at this time) with

necrosis


Acute kidney failure now requiring dialysis


Salpingitis


Gallstones (Calculus of gallbladder with acute cholecystitis without 

obstruction)


HTN 


Leukocytosis 


Hypoxia


Uterine fibroid


Intractable pain


Intractable nausea


Covid 19 negative. 


Acute on chronic anemia 


EEG: No seizure activityFever  - better currently - intermittent could be from 

underlying pancreatitis blood cults 5/4 - neg so far


? Ileus with vomiting


Abd distention - U/S and CT reviewed s/p 0.4 L of opaque, debris-containing 

ascites was removed 5/6


Acute pancreatitis with persistent necrosis


- 4/27 status post ROBERT drain placement + C paropsilosis. s/p additional drains 

5/8


Anemia - S/p PRBCs


Cholelithiasis with thickening of the gallbladder wall.


Leucocytosis improving


JED, hyperkalemia, Metabolic acidosis off dialysis


Acute hypoxic resp failure ,bilateral pleural effusion and atelectasis


hypocalcemia 


Prediabetes


HTN


s/p trach


ESRD on HD


Hyperglycemia





History of Present Illness


History of Present Illness


5/21  stronger,   better,   


we discussed better oral care


she would like to try swallow study,  wants to try to eat,    speech is f

ollowing








5/20/2020 


She remains in the ICU


sitting up and working with OT,   getting better


if limit pain meds, may do better off the vent, 





Nurses trying to suction her,  that is also improved, 


Chart reviewed


 








5/19/2020


Patient seen and examined in the ICU


She had an episode yesterday of tachycardia and severe agitation we gave her 

some Ativan


After that she seemed to have stroke symptoms but now that the Ativan has wore 

off her stroke symptoms have resolved


 


 


She is on IV meropenem and daptomycin and micafungin


Chart reviewed


Discussed with RN


Patient is still critically ill


 


BRIEF OPERATIVE NOTE


Pre-Op Diagnosis


Pancreatitis with pseudocysts, suspected infection


Post-Op Diagnosis


same


Procedure Performed


CT abdominal Drains x 3


Surgeon


Hardy


Anesthesia Type:  Conscious Sedation


Findings


3 abdominal drains, 14F, with turbid pancreatic fluid and necrotic debris in 

each.


Complications


No immediate








5/9: Patient today somewhat restless and having bilious secretions from ET tube,

imaging studies ordered, discussed with consultant. Pretty poor prognosis, 

hopefully is not a fistula, poor surgical candidate. 





5/10: Imaging with no acute events, she seems more stable today compared to 

yesterday. Encouraged as much activity as possible patient at high risk for 

severe depression.





Vitals


Vitals





Vital Signs








  Date Time  Temp Pulse Resp B/P (MAP) Pulse Ox O2 Delivery O2 Flow Rate FiO2


 


5/21/20 14:00  94 18 148/86 (106) 96 Tracheal Collar 8.0 


 


5/21/20 12:00 98.4       





 98.4       











Physical Exam


Physical Exam


GENERAL: Severely encephalopathic trying to talk but has a lot of secretions and

is confused


HEENT:  oral cavity dry, NGT


NECK:  Trach with speaking valve


LUNGS: no rhonchi


HEART:  S1, S2, regular 


ABDOMEN: mod Distended, hypoactive BS, tender, + drainsx1


: [place] (4/14)


EXTREMITIES: Generalized edema, improving, no cyanosis, SCDs bilaterally 


SKIN: Warm and dry.  No generalized rash.  


CNS: Very weak 


PICC(4/30) clean


General:  No acute distress


Heart:  Regular rate, Normal S1, Normal S2, No murmurs, Gallops


Lungs:  Wheezing, Other (decrease bs)


Abdomen:  Soft, Other (drains in place)


Extremities:  No clubbing, No cyanosis, No edema, Normal pulses, No 

tenderness/swelling


Skin:  Other (warm, dry)





Labs


LABS





Laboratory Tests








Test


 5/20/20


16:28 5/21/20


00:44 5/21/20


06:10 5/21/20


06:22


 


Glucose (Fingerstick)


 141 mg/dL


(70-99) 165 mg/dL


(70-99) 


 122 mg/dL


(70-99)


 


White Blood Count


 


 


 8.3 x10^3/uL


(4.0-11.0) 





 


Red Blood Count


 


 


 2.72 x10^6/uL


(3.50-5.40) 





 


Hemoglobin


 


 


 7.8 g/dL


(12.0-15.5) 





 


Hematocrit


 


 


 23.8 %


(36.0-47.0) 





 


Mean Corpuscular Volume   87 fL ()  


 


Mean Corpuscular Hemoglobin   29 pg (25-35)  


 


Mean Corpuscular Hemoglobin


Concent 


 


 33 g/dL


(31-37) 





 


Red Cell Distribution Width


 


 


 18.2 %


(11.5-14.5) 





 


Platelet Count


 


 


 380 x10^3/uL


(140-400) 





 


Sodium Level


 


 


 139 mmol/L


(136-145) 





 


Potassium Level


 


 


 4.0 mmol/L


(3.5-5.1) 





 


Chloride Level


 


 


 100 mmol/L


() 





 


Carbon Dioxide Level


 


 


 39 mmol/L


(21-32) 





 


Anion Gap   0 (6-14)  


 


Blood Urea Nitrogen


 


 


 24 mg/dL


(7-20) 





 


Creatinine


 


 


 0.7 mg/dL


(0.6-1.0) 





 


Estimated GFR


(Cockcroft-Gault) 


 


 88.9 


 





 


Glucose Level


 


 


 118 mg/dL


(70-99) 





 


Calcium Level


 


 


 8.6 mg/dL


(8.5-10.1) 





 


Phosphorus Level


 


 


 3.0 mg/dL


(2.6-4.7) 





 


Magnesium Level


 


 


 1.9 mg/dL


(1.8-2.4) 





 


Test


 5/21/20


12:06 


 


 





 


Glucose (Fingerstick)


 192 mg/dL


(70-99) 


 


 














Assessment and Plan


Assessmemt and Plan


Problems


Medical Problems:


(1) Acute pancreatitis


Status: Acute  





(2) Cholelithiasis


Status: Acute  











Comment


Review of Relevant


I have reviewed the following items josy (where applicable) has been applied.


Labs





Laboratory Tests








Test


 5/19/20


17:42 5/19/20


23:41 5/20/20


06:45 5/20/20


06:53


 


Glucose (Fingerstick)


 122 mg/dL


(70-99) 207 mg/dL


(70-99) 


 146 mg/dL


(70-99)


 


Sodium Level


 


 


 140 mmol/L


(136-145) 





 


Potassium Level


 


 


 3.9 mmol/L


(3.5-5.1) 





 


Chloride Level


 


 


 99 mmol/L


() 





 


Carbon Dioxide Level


 


 


 41 mmol/L


(21-32) 





 


Anion Gap   0 (6-14)  


 


Blood Urea Nitrogen


 


 


 30 mg/dL


(7-20) 





 


Creatinine


 


 


 0.7 mg/dL


(0.6-1.0) 





 


Estimated GFR


(Cockcroft-Gault) 


 


 88.9 


 





 


Glucose Level


 


 


 147 mg/dL


(70-99) 





 


Calcium Level


 


 


 8.8 mg/dL


(8.5-10.1) 





 


Test


 5/20/20


09:30 5/20/20


12:31 5/20/20


16:28 5/21/20


00:44


 


O2 Saturation 97 % (92-99)    


 


Arterial Blood pH


 7.50


(7.35-7.45) 


 


 





 


Arterial Blood pCO2 at


Patient Temp 54 mmHg


(35-46) 


 


 





 


Arterial Blood pO2 at Patient


Temp 106 mmHg


() 


 


 





 


Arterial Blood HCO3


 41 mmol/L


(21-28) 


 


 





 


Arterial Blood Base Excess


 16 mmol/L


(-3-3) 


 


 





 


FiO2 30% trial    


 


Glucose (Fingerstick)


 


 258 mg/dL


(70-99) 141 mg/dL


(70-99) 165 mg/dL


(70-99)


 


Test


 5/21/20


06:10 5/21/20


06:22 5/21/20


12:06 





 


White Blood Count


 8.3 x10^3/uL


(4.0-11.0) 


 


 





 


Red Blood Count


 2.72 x10^6/uL


(3.50-5.40) 


 


 





 


Hemoglobin


 7.8 g/dL


(12.0-15.5) 


 


 





 


Hematocrit


 23.8 %


(36.0-47.0) 


 


 





 


Mean Corpuscular Volume 87 fL ()    


 


Mean Corpuscular Hemoglobin 29 pg (25-35)    


 


Mean Corpuscular Hemoglobin


Concent 33 g/dL


(31-37) 


 


 





 


Red Cell Distribution Width


 18.2 %


(11.5-14.5) 


 


 





 


Platelet Count


 380 x10^3/uL


(140-400) 


 


 





 


Sodium Level


 139 mmol/L


(136-145) 


 


 





 


Potassium Level


 4.0 mmol/L


(3.5-5.1) 


 


 





 


Chloride Level


 100 mmol/L


() 


 


 





 


Carbon Dioxide Level


 39 mmol/L


(21-32) 


 


 





 


Anion Gap 0 (6-14)    


 


Blood Urea Nitrogen


 24 mg/dL


(7-20) 


 


 





 


Creatinine


 0.7 mg/dL


(0.6-1.0) 


 


 





 


Estimated GFR


(Cockcroft-Gault) 88.9 


 


 


 





 


Glucose Level


 118 mg/dL


(70-99) 


 


 





 


Calcium Level


 8.6 mg/dL


(8.5-10.1) 


 


 





 


Phosphorus Level


 3.0 mg/dL


(2.6-4.7) 


 


 





 


Magnesium Level


 1.9 mg/dL


(1.8-2.4) 


 


 





 


Glucose (Fingerstick)


 


 122 mg/dL


(70-99) 192 mg/dL


(70-99) 











Laboratory Tests








Test


 5/20/20


16:28 5/21/20


00:44 5/21/20


06:10 5/21/20


06:22


 


Glucose (Fingerstick)


 141 mg/dL


(70-99) 165 mg/dL


(70-99) 


 122 mg/dL


(70-99)


 


White Blood Count


 


 


 8.3 x10^3/uL


(4.0-11.0) 





 


Red Blood Count


 


 


 2.72 x10^6/uL


(3.50-5.40) 





 


Hemoglobin


 


 


 7.8 g/dL


(12.0-15.5) 





 


Hematocrit


 


 


 23.8 %


(36.0-47.0) 





 


Mean Corpuscular Volume   87 fL ()  


 


Mean Corpuscular Hemoglobin   29 pg (25-35)  


 


Mean Corpuscular Hemoglobin


Concent 


 


 33 g/dL


(31-37) 





 


Red Cell Distribution Width


 


 


 18.2 %


(11.5-14.5) 





 


Platelet Count


 


 


 380 x10^3/uL


(140-400) 





 


Sodium Level


 


 


 139 mmol/L


(136-145) 





 


Potassium Level


 


 


 4.0 mmol/L


(3.5-5.1) 





 


Chloride Level


 


 


 100 mmol/L


() 





 


Carbon Dioxide Level


 


 


 39 mmol/L


(21-32) 





 


Anion Gap   0 (6-14)  


 


Blood Urea Nitrogen


 


 


 24 mg/dL


(7-20) 





 


Creatinine


 


 


 0.7 mg/dL


(0.6-1.0) 





 


Estimated GFR


(Cockcroft-Gault) 


 


 88.9 


 





 


Glucose Level


 


 


 118 mg/dL


(70-99) 





 


Calcium Level


 


 


 8.6 mg/dL


(8.5-10.1) 





 


Phosphorus Level


 


 


 3.0 mg/dL


(2.6-4.7) 





 


Magnesium Level


 


 


 1.9 mg/dL


(1.8-2.4) 





 


Test


 5/21/20


12:06 


 


 





 


Glucose (Fingerstick)


 192 mg/dL


(70-99) 


 


 











Microbiology


5/17/20 Blood Culture - Preliminary, Resulted


          NO GROWTH AFTER 4 DAYS


5/6/20 Fungal Culture - Final, Complete


         


5/6/20 Fungal Culture Result 1 - Final, Complete


         


4/30/20 Aerobic Culture - Final, Complete


          


4/30/20 Aerobic Culture Result 1 (ANSON) - Final, Complete


          


4/30/20 Gram Stain - Final, Complete


          


4/30/20 Gram Stain Result 1 (ANSON) - Final, Complete


          


4/30/20 Gram Stain Result 2 (ANSON) - Final, Complete


          


4/12/20 Urine Culture - Final, Complete


          


4/12/20 Urine Culture Result 1 (ANSON) - Final, Complete


Medications





Current Medications


Sodium Chloride 1,000 ml @  1,000 mls/hr Q1H IV  Last administered on 3/16/20at 

03:00;  Start 3/16/20 at 03:00;  Stop 3/16/20 at 03:59;  Status DC


Ondansetron HCl (Zofran) 4 mg 1X  ONCE IVP  Last administered on 3/16/20at 

03:27;  Start 3/16/20 at 03:00;  Stop 3/16/20 at 03:01;  Status DC


Morphine Sulfate (Morphine Sulfate) 4 mg 1X  ONCE IV ;  Start 3/16/20 at 03:00; 

Stop 3/16/20 at 03:01;  Status Cancel


Ketorolac Tromethamine (Toradol 30mg Vial) 30 mg 1X  ONCE IV  Last administered 

on 3/16/20at 02:54;  Start 3/16/20 at 03:00;  Stop 3/16/20 at 03:01;  Status DC


Fentanyl Citrate (Fentanyl 2ml Vial) 25 mcg 1X  ONCE IVP  Last administered on 

3/16/20at 03:23;  Start 3/16/20 at 03:30;  Stop 3/16/20 at 03:31;  Status DC


Fentanyl Citrate (Fentanyl 2ml Vial) 100 mcg STK-MED ONCE .ROUTE ;  Start 

3/16/20 at 03:18;  Stop 3/16/20 at 03:18;  Status DC


Iohexol (Omnipaque 350 Mg/ml) 90 ml 1X  ONCE IV  Last administered on 3/16/20at 

03:25;  Start 3/16/20 at 03:30;  Stop 3/16/20 at 03:31;  Status DC


Info (CONTRAST GIVEN -- Rx MONITORING) 1 each PRN DAILY  PRN MC SEE COMMENTS;  

Start 3/16/20 at 03:30;  Stop 3/18/20 at 03:29;  Status DC


Hydromorphone HCl (Dilaudid) 0.5 mg 1X  ONCE IV  Last administered on 3/16/20at 

03:55;  Start 3/16/20 at 04:30;  Stop 3/16/20 at 04:32;  Status DC


Ondansetron HCl (Zofran) 4 mg PRN Q8HRS  PRN IV NAUSEA/VOMITING 1ST CHOICE;  

Start 3/16/20 at 05:00;  Stop 3/16/20 at 09:27;  Status DC


Morphine Sulfate (Morphine Sulfate) 2 mg PRN Q2HR  PRN IV SEVERE PAIN 7-10 Last 

administered on 3/17/20at 12:26;  Start 3/16/20 at 05:00;  Stop 3/17/20 at 

14:15;  Status DC


Sodium Chloride 1,000 ml @  125 mls/hr Q8H IV  Last administered on 3/16/20at 

20:56;  Start 3/16/20 at 05:00;  Stop 3/17/20 at 04:59;  Status DC


Hydromorphone HCl (Dilaudid) 0.5 mg PRN Q3HRS  PRN IV SEVERE PAIN 7-10 Last 

administered on 3/17/20at 10:06;  Start 3/16/20 at 05:00;  Stop 3/17/20 at 

12:01;  Status DC


Piperacillin Sod/ Tazobactam Sod 4.5 gm/Sodium Chloride 100 ml @  200 mls/hr 1X 

ONCE IV  Last administered on 3/16/20at 05:44;  Start 3/16/20 at 06:00;  Stop 

3/16/20 at 06:29;  Status DC


Ondansetron HCl (Zofran) 4 mg PRN Q4HRS  PRN IV NAUSEA/VOMITING 1ST CHOICE Last 

administered on 5/19/20at 12:40;  Start 3/16/20 at 09:30


Insulin Human Lispro (HumaLOG) 0-9 UNITS Q6HRS SQ  Last administered on 

5/21/20at 12:11;  Start 3/16/20 at 09:30


Dextrose (Dextrose 50%-Water Syringe) 12.5 gm PRN Q15MIN  PRN IV SEE COMMENTS;  

Start 3/16/20 at 09:30


Pantoprazole Sodium (PROTONIX VIAL for IV PUSH) 40 mg DAILYAC IVP  Last 

administered on 5/21/20at 08:22;  Start 3/16/20 at 11:30


Prochlorperazine Edisylate (Compazine) 10 mg PRN Q6HRS  PRN IV NAUSEA/VOMITING, 

2nd CHOICE Last administered on 5/14/20at 06:11;  Start 3/16/20 at 17:45


Atenolol (Tenormin) 100 mg DAILY PO ;  Start 3/17/20 at 09:00;  Stop 3/16/20 at 

20:08;  Status DC


Metoprolol Tartrate (Lopressor Vial) 2.5 mg Q6HRS IVP  Last administered on 

3/17/20at 05:51;  Start 3/16/20 at 20:15;  Stop 3/17/20 at 10:02;  Status DC


Metoprolol Tartrate (Lopressor Vial) 5 mg Q6HRS IVP  Last administered on 

3/26/20at 00:12;  Start 3/17/20 at 10:15;  Stop 3/28/20 at 08:48;  Status DC


Hydromorphone HCl (Dilaudid) 1 mg PRN Q3HRS  PRN IV SEVERE PAIN 7-10 Last admin

istered on 3/23/20at 05:13;  Start 3/17/20 at 12:00;  Stop 3/31/20 at 00:25;  

Status DC


Lidocaine HCl (Buffered Lidocaine 1%) 3 ml STK-MED ONCE .ROUTE ;  Start 3/17/20 

at 12:55;  Stop 3/17/20 at 12:56;  Status DC


Albumin Human 500 ml @  125 mls/hr 1X  ONCE IV  Last administered on 3/17/20at 

14:33;  Start 3/17/20 at 14:30;  Stop 3/17/20 at 18:32;  Status DC


Norepinephrine Bitartrate 8 mg/ Dextrose 258 ml @  17.299 mls/ hr CONT  PRN IV 

PER PROTOCOL Last administered on 4/14/20at 12:48;  Start 3/17/20 at 15:30;  

Stop 4/17/20 at 09:19;  Status DC


Sodium Chloride 1,000 ml @  125 mls/hr Q8H IV  Last administered on 3/17/20at 

21:04;  Start 3/17/20 at 16:00;  Stop 3/18/20 at 02:42;  Status DC


Albumin Human 500 ml @  125 mls/hr PRN BID  PRN IV After every 2L NSS & BP < 

90mm Last administered on 4/1/20at 14:21;  Start 3/17/20 at 16:00


Iohexol (Omnipaque 300 Mg/ml) 60 ml 1X  ONCE IV  Last administered on 3/17/20at 

17:20;  Start 3/17/20 at 17:00;  Stop 3/17/20 at 17:01;  Status DC


Info (CONTRAST GIVEN -- Rx MONITORING) 1 each PRN DAILY  PRN MC SEE COMMENTS;  

Start 3/17/20 at 17:00;  Stop 3/19/20 at 16:59;  Status DC


Meropenem 1 gm/ Sodium Chloride 100 ml @  200 mls/hr Q8HRS IV  Last administered

on 3/18/20at 05:45;  Start 3/17/20 at 20:00;  Stop 3/18/20 at 08:48;  Status DC


Furosemide (Lasix) 40 mg 1X  ONCE IVP  Last administered on 3/17/20at 22:12;  

Start 3/17/20 at 22:30;  Stop 3/17/20 at 22:31;  Status DC


Calcium Chloride 1000 mg/Sodium Chloride 110 ml @  220 mls/hr 1X  ONCE IV  Last 

administered on 3/17/20at 22:11;  Start 3/17/20 at 22:30;  Stop 3/17/20 at 

22:59;  Status DC


Albuterol Sulfate (Ventolin Neb Soln) 2.5 mg 1X  ONCE NEB  Last administered on 

3/18/20at 00:56;  Start 3/17/20 at 22:30;  Stop 3/17/20 at 22:31;  Status DC


Insulin Human Regular (HumuLIN R VIAL) 5 unit 1X  ONCE IV  Last administered on 

3/17/20at 22:14;  Start 3/17/20 at 22:30;  Stop 3/17/20 at 22:31;  Status DC


Magnesium Sulfate 50 ml @ 25 mls/hr 1X  ONCE IV  Last administered on 3/18/20at 

02:57;  Start 3/18/20 at 03:00;  Stop 3/18/20 at 04:59;  Status DC


Calcium Gluconate 1000 mg/Sodium Chloride 110 ml @  220 mls/hr 1X  ONCE IV  Last

administered on 3/18/20at 02:46;  Start 3/18/20 at 03:00;  Stop 3/18/20 at 

03:29;  Status DC


Sodium Chloride 1,000 ml @  200 mls/hr Q5H IV  Last administered on 3/18/20at 

02:46;  Start 3/18/20 at 03:00;  Stop 3/18/20 at 10:21;  Status DC


Calcium Gluconate 1000 mg/Sodium Chloride 110 ml @  220 mls/hr 1X  ONCE IV  Last

administered on 3/18/20at 03:21;  Start 3/18/20 at 03:30;  Stop 3/18/20 at 

03:59;  Status DC


Sodium Bicarbonate 50 meq/Sodium Chloride 1,050 ml @  75 mls/hr Q14H IV  Last 

administered on 3/22/20at 21:10;  Start 3/18/20 at 07:30;  Stop 3/23/20 at 

10:28;  Status DC


Calcium Gluconate 2000 mg/Sodium Chloride 120 ml @  220 mls/hr 1X  ONCE IV  Last

administered on 3/18/20at 09:05;  Start 3/18/20 at 07:30;  Stop 3/18/20 at 

08:02;  Status DC


Lidocaine HCl (Xylocaine-Mpf 1% 2ml Vial) 2 ml STK-MED ONCE .ROUTE ;  Start 

3/18/20 at 08:47;  Stop 3/18/20 at 08:47;  Status DC


Meropenem 500 mg/ Sodium Chloride 50 ml @  100 mls/hr Q12HR IV  Last 

administered on 3/23/20at 21:01;  Start 3/18/20 at 18:00;  Stop 3/24/20 at 

07:58;  Status DC


Lidocaine HCl (Buffered Lidocaine 1%) 3 ml STK-MED ONCE .ROUTE ;  Start 3/18/20 

at 09:46;  Stop 3/18/20 at 09:46;  Status DC


Lidocaine HCl (Buffered Lidocaine 1%) 6 ml 1X  ONCE INJ  Last administered on 

3/18/20at 10:26;  Start 3/18/20 at 10:15;  Stop 3/18/20 at 10:16;  Status DC


Info (Tpn Per Pharmacy) 1 each PRN DAILY  PRN MC SEE COMMENTS Last administered 

on 5/21/20at 11:11;  Start 3/18/20 at 12:00


Sodium Chloride 1,000 ml @  1,000 mls/hr Q1H PRN IV hypotension;  Start 3/18/20 

at 12:07;  Stop 3/18/20 at 18:06;  Status DC


Diphenhydramine HCl (Benadryl) 25 mg 1X PRN  PRN IV ITCHING;  Start 3/18/20 at 

12:15;  Stop 3/19/20 at 12:14;  Status DC


Diphenhydramine HCl (Benadryl) 25 mg 1X PRN  PRN IV ITCHING;  Start 3/18/20 at 

12:15;  Stop 3/19/20 at 12:14;  Status DC


Sodium Chloride 1,000 ml @  400 mls/hr Q2H30M PRN IV PATENCY;  Start 3/18/20 at 

12:07;  Stop 3/19/20 at 00:06;  Status DC


Info (PHARMACY MONITORING -- do not chart) 1 each PRN DAILY  PRN MC SEE 

COMMENTS;  Start 3/18/20 at 12:15;  Stop 3/20/20 at 08:13;  Status DC


Sodium Chloride 90 meq/Calcium Gluconate 10 meq/ Multivitamins 10 ml/Chromium/ 

Copper/Manganese/ Seleni/Zn 1 ml/ Total Parenteral Nutrition/Amino 

Acids/Dextrose/ Fat Emulsion Intravenous 55.005 ml  @ 2.292 mls/hr TPN  CONT IV 

;  Start 3/18/20 at 22:00;  Stop 3/18/20 at 12:33;  Status DC


Info (Tpn Per Pharmacy) 1 each PRN DAILY  PRN MC SEE COMMENTS;  Start 3/18/20 at

12:30;  Status UNV


Sodium Chloride 90 meq/Calcium Gluconate 10 meq/ Multivitamins 10 ml/Chromium/ 

Copper/Manganese/ Seleni/Zn 0.5 ml/ Total Parenteral Nutrition/Amino 

Acids/Dextrose/ Fat Emulsion Intravenous 1,512 ml @  63 mls/hr TPN  CONT IV  

Last administered on 3/18/20at 22:06;  Start 3/18/20 at 22:00;  Stop 3/19/20 at 

21:59;  Status DC


Calcium Carbonate/ Glycine (Tums) 500 mg PRN AFTMEALHC  PRN PO INDIGESTION;  

Start 3/18/20 at 17:45;  Stop 5/13/20 at 10:25;  Status DC


Calcium Gluconate (Calcium Gluconate) 2,000 mg 1X  ONCE IVP  Last administered 

on 3/19/20at 02:19;  Start 3/19/20 at 02:15;  Stop 3/19/20 at 02:16;  Status DC


Calcium Chloride 3000 mg/Sodium Chloride 1,030 ml @  50 mls/hr [id number] IV  Last 

administered on 3/21/20at 02:17;  Start 3/19/20 at 08:00;  Stop 3/21/20 at 

15:23;  Status DC


Lorazepam (Ativan Inj) 1 mg PRN Q4HRS  PRN IVP ANXIETY / AGITATION, 2nd choic 

Last administered on 4/17/20at 03:51;  Start 3/19/20 at 09:00;  Stop 4/17/20 at 

09:19;  Status DC


Sodium Chloride 1,000 ml @  1,000 mls/hr Q1H PRN IV hypotension;  Start 3/19/20 

at 08:56;  Stop 3/19/20 at 14:55;  Status DC


Albumin Human 200 ml @  200 mls/hr 1X PRN  PRN IV Hypotension;  Start 3/19/20 at

09:00;  Stop 3/19/20 at 14:59;  Status DC


Diphenhydramine HCl (Benadryl) 25 mg 1X PRN  PRN IV ITCHING;  Start 3/19/20 at 

09:00;  Stop 3/20/20 at 08:59;  Status DC


Diphenhydramine HCl (Benadryl) 25 mg 1X PRN  PRN IV ITCHING;  Start 3/19/20 at 

09:00;  Stop 3/20/20 at 08:59;  Status DC


Sodium Chloride 1,000 ml @  400 mls/hr Q2H30M PRN IV PATENCY;  Start 3/19/20 at 

08:56;  Stop 3/19/20 at 20:55;  Status DC


Info (PHARMACY MONITORING -- do not chart) 1 each PRN DAILY  PRN MC SEE 

COMMENTS;  Start 3/19/20 at 09:00;  Status UNV


Info (PHARMACY MONITORING -- do not chart) 1 each PRN DAILY  PRN MC SEE 

COMMENTS;  Start 3/19/20 at 09:00;  Stop 3/20/20 at 08:13;  Status DC


Digoxin (Lanoxin) 500 mcg 1X  ONCE IV  Last administered on 3/19/20at 10:04;  

Start 3/19/20 at 10:00;  Stop 3/19/20 at 10:01;  Status DC


Digoxin (Lanoxin) 125 mcg 1X  ONCE IV  Last administered on 3/19/20at 17:10;  

Start 3/19/20 at 18:00;  Stop 3/19/20 at 18:01;  Status DC


Magnesium Sulfate 100 ml @  25 mls/hr 1X  ONCE IV  Last administered on 

3/19/20at 12:48;  Start 3/19/20 at 13:00;  Stop 3/19/20 at 16:59;  Status DC


Sodium Chloride 90 meq/Magnesium Sulfate 10 meq/ Calcium Gluconate 20 meq/ 

Multivitamins 10 ml/Chromium/ Copper/Manganese/ Seleni/Zn 0.5 ml/ Total 

Parenteral Nutrition/Amino Acids/Dextrose/ Fat Emulsion Intravenous 1,512 ml @  

63 mls/hr TPN  CONT IV  Last administered on 3/19/20at 22:25;  Start 3/19/20 at 

22:00;  Stop 3/20/20 at 21:59;  Status DC


Sodium Chloride 1,000 ml @  1,000 mls/hr Q1H PRN IV hypotension;  Start 3/20/20 

at 08:05;  Stop 3/20/20 at 14:04;  Status DC


Albumin Human 200 ml @  200 mls/hr 1X  ONCE IV  Last administered on 3/20/20at 

08:57;  Start 3/20/20 at 08:15;  Stop 3/20/20 at 09:14;  Status DC


Diphenhydramine HCl (Benadryl) 25 mg 1X PRN  PRN IV ITCHING;  Start 3/20/20 at 

08:15;  Stop 3/21/20 at 08:14;  Status DC


Diphenhydramine HCl (Benadryl) 25 mg 1X PRN  PRN IV ITCHING;  Start 3/20/20 at 

08:15;  Stop 3/21/20 at 08:14;  Status DC


Sodium Chloride 1,000 ml @  400 mls/hr Q2H30M PRN IV PATENCY;  Start 3/20/20 at 

08:05;  Stop 3/20/20 at 20:04;  Status DC


Info (PHARMACY MONITORING -- do not chart) 1 each PRN DAILY  PRN MC SEE 

COMMENTS;  Start 3/20/20 at 08:15;  Stop 3/24/20 at 07:57;  Status DC


Sodium Chloride 90 meq/Potassium Chloride 15 meq/ Potassium Phosphate 10 mmol/ 

Magnesium Sulfate 10 meq/Calcium Gluconate 20 meq/ Multivitamins 10 ml/Chromium/

Copper/Manganese/ Seleni/Zn 0.5 ml/ Total Parenteral Nutrition/Amino 

Acids/Dextrose/ Fat Emulsion Intravenous 1,512 ml @  63 mls/hr TPN  CONT IV  

Last administered on 3/20/20at 21:01;  Start 3/20/20 at 22:00;  Stop 3/21/20 at 

21:59;  Status DC


Potassium Chloride/Water 100 ml @  100 mls/hr 1X  ONCE IV  Last administered on 

3/20/20at 14:09;  Start 3/20/20 at 14:00;  Stop 3/20/20 at 14:59;  Status DC


Benzocaine (Hurricaine One) 1 spray 1X  ONCE MM  Last administered on 3/20/20at 

16:38;  Start 3/20/20 at 14:30;  Stop 3/20/20 at 14:31;  Status DC


Lidocaine HCl (Glydo (Lidocaine) Jelly) 1 thomas 1X  ONCE MM  Last administered on 

3/20/20at 16:38;  Start 3/20/20 at 14:30;  Stop 3/20/20 at 14:31;  Status DC


Linezolid/Dextrose 300 ml @  300 mls/hr Q12HR IV  Last administered on 3/26/20at

21:04;  Start 3/20/20 at 20:00;  Stop 3/27/20 at 07:50;  Status DC


Acetaminophen (Tylenol) 650 mg PRN Q6HRS  PRN PO MILD PAIN / TEMP;  Start 3/21/2

0 at 03:30;  Stop 3/21/20 at 03:36;  Status DC


Acetaminophen (Tylenol) 650 mg PRN Q6HRS  PRN PEG MILD PAIN / TEMP Last 

administered on 4/16/20at 19:56;  Start 3/21/20 at 03:36;  Stop 5/13/20 at 

10:25;  Status DC


Sodium Chloride 1,000 ml @  1,000 mls/hr Q1H PRN IV hypotension;  Start 3/21/20 

at 07:50;  Stop 3/21/20 at 13:49;  Status DC


Albumin Human 200 ml @  200 mls/hr 1X PRN  PRN IV Hypotension;  Start 3/21/20 at

08:00;  Stop 3/21/20 at 13:59;  Status DC


Sodium Chloride (Normal Saline Flush) 10 ml 1X PRN  PRN IV AP catheter pack;  

Start 3/21/20 at 08:00;  Stop 3/22/20 at 07:59;  Status DC


Sodium Chloride (Normal Saline Flush) 10 ml 1X PRN  PRN IV  catheter pack;  

Start 3/21/20 at 08:00;  Stop 3/22/20 at 07:59;  Status DC


Sodium Chloride 1,000 ml @  400 mls/hr Q2H30M PRN IV PATENCY;  Start 3/21/20 at 

07:50;  Stop 3/21/20 at 19:49;  Status DC


Info (PHARMACY MONITORING -- do not chart) 1 each PRN DAILY  PRN MC SEE 

COMMENTS;  Start 3/21/20 at 08:00;  Status UNV


Info (PHARMACY MONITORING -- do not chart) 1 each PRN DAILY  PRN MC SEE 

COMMENTS;  Start 3/21/20 at 08:00;  Stop 3/23/20 at 08:25;  Status DC


Sodium Chloride 90 meq/Potassium Chloride 15 meq/ Potassium Phosphate 10 mmol/ 

Magnesium Sulfate 10 meq/Calcium Gluconate 20 meq/ Multivitamins 10 ml/Chromium/

Copper/Manganese/ Seleni/Zn 0.5 ml/ Total Parenteral Nutrition/Amino 

Acids/Dextrose/ Fat Emulsion Intravenous 1,512 ml @  63 mls/hr TPN  CONT IV  

Last administered on 3/21/20at 20:57;  Start 3/21/20 at 22:00;  Stop 3/22/20 at 

21:59;  Status DC


Sodium Chloride 90 meq/Potassium Chloride 15 meq/ Potassium Phosphate 15 mmol/ 

Magnesium Sulfate 10 meq/Calcium Gluconate 20 meq/ Multivitamins 10 ml/Chromium/

Copper/Manganese/ Seleni/Zn 0.5 ml/ Total Parenteral Nutrition/Amino 

Acids/Dextrose/ Fat Emulsion Intravenous 1,512 ml @  63 mls/hr TPN  CONT IV ;  

Start 3/22/20 at 22:00;  Stop 3/22/20 at 14:16;  Status DC


Sodium Chloride 90 meq/Potassium Chloride 15 meq/ Potassium Phosphate 15 mmol/ 

Magnesium Sulfate 10 meq/Calcium Gluconate 20 meq/ Multivitamins 10 ml/Chromium/

Copper/Manganese/ Seleni/Zn 0.5 ml/ Total Parenteral Nutrition/Amino 

Acids/Dextrose/ Fat Emulsion Intravenous 1,200 ml @  50 mls/hr TPN  CONT IV ;  

Start 3/22/20 at 22:00;  Stop 3/22/20 at 14:17;  Status DC


Sodium Chloride 90 meq/Potassium Chloride 15 meq/ Potassium Phosphate 10 mmol/ 

Magnesium Sulfate 10 meq/Calcium Gluconate 20 meq/ Multivitamins 10 ml/Chromium/

Copper/Manganese/ Seleni/Zn 0.5 ml/ Total Parenteral Nutrition/Amino 

Acids/Dextrose/ Fat Emulsion Intravenous 1,200 ml @  50 mls/hr TPN  CONT IV  

Last administered on 3/22/20at 23:29;  Start 3/22/20 at 22:00;  Stop 3/23/20 at 

21:59;  Status DC


Sodium Chloride 1,000 ml @  1,000 mls/hr Q1H PRN IV hypotension;  Start 3/23/20 

at 07:28;  Stop 3/23/20 at 13:27;  Status DC


Albumin Human 200 ml @  200 mls/hr 1X  ONCE IV  Last administered on 3/23/20at 

08:51;  Start 3/23/20 at 07:30;  Stop 3/23/20 at 08:29;  Status DC


Diphenhydramine HCl (Benadryl) 25 mg 1X PRN  PRN IV ITCHING;  Start 3/23/20 at 

07:30;  Stop 3/24/20 at 07:29;  Status DC


Diphenhydramine HCl (Benadryl) 25 mg 1X PRN  PRN IV ITCHING;  Start 3/23/20 at 

07:30;  Stop 3/24/20 at 07:29;  Status DC


Sodium Chloride 1,000 ml @  400 mls/hr Q2H30M PRN IV PATENCY;  Start 3/23/20 at 

07:28;  Stop 3/23/20 at 19:27;  Status DC


Info (PHARMACY MONITORING -- do not chart) 1 each PRN DAILY  PRN MC SEE 

COMMENTS;  Start 3/23/20 at 07:30;  Stop 4/3/20 at 13:01;  Status DC


Metronidazole 100 ml @  100 mls/hr Q6HRS IV  Last administered on 4/8/20at 

06:26;  Start 3/23/20 at 08:30;  Stop 4/8/20 at 09:58;  Status DC


Micafungin Sodium 100 mg/Dextrose 100 ml @  100 mls/hr Q24H IV  Last 

administered on 4/30/20at 08:18;  Start 3/23/20 at 09:00;  Stop 4/30/20 at 

20:58;  Status DC


Propofol 0 ml @ As Directed STK-MED ONCE IV ;  Start 3/23/20 at 07:53;  Stop 

3/23/20 at 07:53;  Status DC


Etomidate (Amidate) 20 mg STK-MED ONCE IV ;  Start 3/23/20 at 07:53;  Stop 

3/23/20 at 07:54;  Status DC


Midazolam HCl (Versed) 5 mg STK-MED ONCE .ROUTE ;  Start 3/23/20 at 07:57;  Stop

3/23/20 at 07:57;  Status DC


Fentanyl Citrate 30 ml @ 0 mls/hr CONT  PRN IV SEE PROTOCOL Last administered on

4/17/20at 06:12;  Start 3/23/20 at 08:15;  Stop 4/17/20 at 09:19;  Status DC


Artificial Tears (Artificial Tears) 1 drop PRN Q1HR  PRN OU DRY EYE, 1st choice;

 Start 3/23/20 at 08:15;  Stop 4/29/20 at 05:31;  Status DC


Midazolam HCl 50 mg/Sodium Chloride 50 ml @ 0 mls/hr CONT  PRN IV SEE PROTOCOL 

Last administered on 3/26/20at 22:39;  Start 3/23/20 at 08:15;  Stop 3/28/20 at 

15:59;  Status DC


Etomidate (Amidate) 8 mg 1X  ONCE IV  Last administered on 3/23/20at 08:33;  

Start 3/23/20 at 08:30;  Stop 3/23/20 at 08:31;  Status DC


Succinylcholine Chloride (Anectine) 120 mg 1X  ONCE IV  Last administered on 

3/23/20at 08:34;  Start 3/23/20 at 08:30;  Stop 3/23/20 at 08:31;  Status DC


Midazolam HCl (Versed) 5 mg 1X  ONCE IV ;  Start 3/23/20 at 08:30;  Stop 3/23/20

at 08:31;  Status DC


Potassium Chloride 15 meq/ Bicarbonate Dialysis Soln w/ out KCl 5,007.5 ml  @ 

1,000 mls/ hr Q5H1M IV  Last administered on 3/24/20at 11:11;  Start 3/23/20 at 

12:00;  Stop 3/24/20 at 11:15;  Status DC


Potassium Chloride 15 meq/ Bicarbonate Dialysis Soln w/ out KCl 5,007.5 ml  @ 

1,000 mls/ hr Q5H1M IV  Last administered on 3/24/20at 11:12;  Start 3/23/20 at 

12:00;  Stop 3/24/20 at 11:17;  Status DC


Potassium Chloride 15 meq/ Bicarbonate Dialysis Soln w/ out KCl 5,007.5 ml  @ 

1,000 mls/ hr Q5H1M IV  Last administered on 3/24/20at 11:11;  Start 3/23/20 at 

12:00;  Stop 3/24/20 at 11:19;  Status DC


Sodium Chloride 90 meq/Potassium Chloride 15 meq/ Potassium Phosphate 10 mmol/ 

Magnesium Sulfate 10 meq/Calcium Gluconate 20 meq/ Multivitamins 10 ml/Chromium/

Copper/Manganese/ Seleni/Zn 0.5 ml/ Total Parenteral Nutrition/Amino Acids

/Dextrose/ Fat Emulsion Intravenous 1,400 ml @  58.333 mls/ hr TPN  CONT IV  

Last administered on 3/23/20at 21:42;  Start 3/23/20 at 22:00;  Stop 3/24/20 at 

21:59;  Status DC


Heparin Sodium (Porcine) (Heparin Sodium) 5,000 unit Q8HRS SQ  Last administered

on 3/28/20at 05:55;  Start 3/23/20 at 15:00;  Stop 3/28/20 at 13:28;  Status DC


Meropenem 500 mg/ Sodium Chloride 50 ml @  100 mls/hr Q6HRS IV  Last 

administered on 3/25/20at 06:00;  Start 3/24/20 at 09:00;  Stop 3/25/20 at 

07:29;  Status DC


Potassium Phosphate 20 mmol/ Sodium Chloride 106.6667 ml @  51.667 m... 1X  ONCE

IV  Last administered on 3/24/20at 11:22;  Start 3/24/20 at 10:15;  Stop 3/24/20

at 12:18;  Status DC


Acetaminophen (Tylenol Supp) 650 mg PRN Q6HRS  PRN SC MILD PAIN / TEMP > 100.3'F

Last administered on 5/5/20at 09:12;  Start 3/24/20 at 10:30


Potassium Chloride/Water 100 ml @  100 mls/hr Q1H IV  Last administered on 

3/24/20at 12:12;  Start 3/24/20 at 11:00;  Stop 3/24/20 at 12:59;  Status DC


Potassium Chloride 20 meq/ Bicarbonate Dialysis Soln w/ out KCl 5,010 ml @  

1,000 mls/hr Q5H1M IV  Last administered on 3/25/20at 08:48;  Start 3/24/20 at 

12:00;  Stop 3/25/20 at 13:03;  Status DC


Potassium Chloride 20 meq/ Bicarbonate Dialysis Soln w/ out KCl 5,010 ml @  

1,000 mls/hr Q5H1M IV  Last administered on 3/29/20at 14:52;  Start 3/24/20 at 

11:30;  Stop 3/29/20 at 19:59;  Status DC


Potassium Chloride 20 meq/ Bicarbonate Dialysis Soln w/ out KCl 5,010 ml @  

1,000 mls/hr Q5H1M IV  Last administered on 3/29/20at 14:53;  Start 3/24/20 at 

11:30;  Stop 3/29/20 at 19:59;  Status DC


Sodium Chloride 90 meq/Potassium Chloride 15 meq/ Potassium Phosphate 15 mmol/ 

Magnesium Sulfate 10 meq/Calcium Gluconate 15 meq/ Multivitamins 10 ml/Chromium/

Copper/Manganese/ Seleni/Zn 0.5 ml/ Total Parenteral Nutrition/Amino 

Acids/Dextrose/ Fat Emulsion Intravenous 1,400 ml @  58.333 mls/ hr TPN  CONT IV

 Last administered on 3/24/20at 22:17;  Start 3/24/20 at 22:00;  Stop 3/25/20 at

21:59;  Status DC


Cefepime HCl (Maxipime) 2 gm Q12HR IVP  Last administered on 4/7/20at 20:56;  

Start 3/25/20 at 09:00;  Stop 4/8/20 at 09:58;  Status DC


Daptomycin 500 mg/ Sodium Chloride 50 ml @  100 mls/hr Q48H IV  Last 

administered on 4/10/20at 09:57;  Start 3/25/20 at 08:30;  Stop 4/10/20 at 

10:07;  Status DC


Lidocaine HCl (Buffered Lidocaine 1%) 3 ml 1X  ONCE INJ  Last administered on 

3/25/20at 10:27;  Start 3/25/20 at 10:30;  Stop 3/25/20 at 10:31;  Status DC


Potassium Phosphate 20 mmol/ Sodium Chloride 106.6667 ml @  51.667 m... 1X  ONCE

IV  Last administered on 3/25/20at 12:51;  Start 3/25/20 at 13:00;  Stop 3/25/20

at 15:03;  Status DC


Sodium Chloride 90 meq/Potassium Chloride 15 meq/ Potassium Phosphate 18 mmol/ 

Magnesium Sulfate 8 meq/Calcium Gluconate 15 meq/ Multivitamins 10 ml/Chromium/ 

Copper/Manganese/ Seleni/Zn 0.5 ml/ Total Parenteral Nutrition/Amino 

Acids/Dextrose/ Fat Emulsion Intravenous 1,400 ml @  58.333 mls/ hr TPN  CONT IV

 Last administered on 3/25/20at 22:16;  Start 3/25/20 at 22:00;  Stop 3/26/20 at

21:59;  Status DC


Potassium Chloride 20 meq/ Bicarbonate Dialysis Soln w/ out KCl 5,010 ml @  

1,000 mls/hr Q5H1M IV  Last administered on 3/29/20at 14:54;  Start 3/25/20 at 

16:00;  Stop 3/29/20 at 19:59;  Status DC


Multi-Ingred Cream/Lotion/Oil/ Oint (Artificial Tears Eye Ointment) 1 thomas PRN 

Q1HR  PRN OU DRY EYE, 2nd choice Last administered on 4/13/20at 08:19;  Start 

3/25/20 at 17:30


Sodium Chloride 90 meq/Potassium Chloride 15 meq/ Potassium Phosphate 18 mmol/ 

Magnesium Sulfate 8 meq/Calcium Gluconate 15 meq/ Multivitamins 10 ml/Chromium/ 

Copper/Manganese/ Seleni/Zn 0.5 ml/ Total Parenteral Nutrition/Amino 

Acids/Dextrose/ Fat Emulsion Intravenous 1,400 ml @  58.333 mls/ hr TPN  CONT IV

 Last administered on 3/26/20at 22:00;  Start 3/26/20 at 22:00;  Stop 3/27/20 at

21:59;  Status DC


Albumin Human 500 ml @  125 mls/hr 1X  ONCE IV ;  Start 3/26/20 at 14:15;  Stop 

3/26/20 at 18:14;  Status DC


Sodium Chloride 90 meq/Potassium Chloride 15 meq/ Potassium Phosphate 18 mmol/ 

Magnesium Sulfate 8 meq/Calcium Gluconate 15 meq/ Multivitamins 10 ml/Chromium/ 

Copper/Manganese/ Seleni/Zn 0.5 ml/ Insulin Human Regular 10 unit/ Total 

Parenteral Nutrition/Amino Acids/Dextrose/ Fat Emulsion Intravenous 1,400 ml @  

58.333 mls/ hr TPN  CONT IV  Last administered on 3/27/20at 21:43;  Start 

3/27/20 at 22:00;  Stop 3/28/20 at 21:59;  Status DC


Lidocaine HCl (Buffered Lidocaine 1%) 3 ml STK-MED ONCE .ROUTE ;  Start 3/25/20 

at 10:00;  Stop 3/27/20 at 13:57;  Status DC


Midazolam HCl 100 mg/Sodium Chloride 100 ml @ 7 mls/hr CONT  PRN IV SEE PROTOCOL

Last administered on 4/8/20at 15:35;  Start 3/28/20 at 16:00


Sodium Chloride 90 meq/Potassium Chloride 15 meq/ Potassium Phosphate 18 mmol/ 

Magnesium Sulfate 8 meq/Calcium Gluconate 15 meq/ Multivitamins 10 ml/Chromium/ 

Copper/Manganese/ Seleni/Zn 0.5 ml/ Insulin Human Regular 15 unit/ Total 

Parenteral Nutrition/Amino Acids/Dextrose/ Fat Emulsion Intravenous 1,400 ml @  

58.333 mls/ hr TPN  CONT IV  Last administered on 3/28/20at 20:34;  Start 

3/28/20 at 22:00;  Stop 3/29/20 at 21:59;  Status DC


Info ([facility] Electrolyte Protocol) 1 ea CONT PRN  PRN MC PER PROTOCOL;  Start 

3/29/20 at 13:15


Sodium Chloride 90 meq/Potassium Chloride 15 meq/ Potassium Phosphate 18 mmol/ 

Magnesium Sulfate 8 meq/Calcium Gluconate 15 meq/ Multivitamins 10 ml/Chromium/ 

Copper/Manganese/ Seleni/Zn 0.5 ml/ Insulin Human Regular 15 unit/ Total P

arenteral Nutrition/Amino Acids/Dextrose/ Fat Emulsion Intravenous 1,400 ml @  

58.333 mls/ hr TPN  CONT IV  Last administered on 3/29/20at 22:05;  Start 

3/29/20 at 22:00;  Stop 3/30/20 at 21:59;  Status DC


Potassium Chloride 15 meq/ Bicarbonate Dialysis Soln w/ out KCl 5,007.5 ml  @ 

1,000 mls/ hr Q5H1M IV  Last administered on 4/1/20at 18:14;  Start 3/29/20 at 

20:00;  Stop 4/2/20 at 13:08;  Status DC


Potassium Chloride 15 meq/ Bicarbonate Dialysis Soln w/ out KCl 5,007.5 ml  @ 

1,000 mls/ hr Q5H1M IV  Last administered on 4/1/20at 18:14;  Start 3/29/20 at 

20:00;  Stop 4/2/20 at 13:08;  Status DC


Potassium Chloride 15 meq/ Bicarbonate Dialysis Soln w/ out KCl 5,007.5 ml  @ 

1,000 mls/ hr Q5H1M IV  Last administered on 4/1/20at 18:14;  Start 3/29/20 at 

20:00;  Stop 4/2/20 at 13:08;  Status DC


Iohexol (Omnipaque 240 Mg/ml) 30 ml 1X  ONCE PO  Last administered on 3/30/20at 

11:30;  Start 3/30/20 at 11:30;  Stop 3/30/20 at 11:33;  Status DC


Info (CONTRAST GIVEN -- Rx MONITORING) 1 each PRN DAILY  PRN MC SEE COMMENTS;  

Start 3/30/20 at 11:45;  Stop 4/1/20 at 11:44;  Status DC


Sodium Chloride 90 meq/Potassium Chloride 15 meq/ Potassium Phosphate 18 mmol/ 

Magnesium Sulfate 8 meq/Calcium Gluconate 15 meq/ Multivitamins 10 ml/Chromium/ 

Copper/Manganese/ Seleni/Zn 0.5 ml/ Insulin Human Regular 15 unit/ Total 

Parenteral Nutrition/Amino Acids/Dextrose/ Fat Emulsion Intravenous 1,400 ml @  

58.333 mls/ hr TPN  CONT IV  Last administered on 3/30/20at 21:47;  Start 

3/30/20 at 22:00;  Stop 3/31/20 at 21:59;  Status DC


Sodium Chloride 90 meq/Potassium Chloride 15 meq/ Potassium Phosphate 18 mmol/ 

Magnesium Sulfate 8 meq/Calcium Gluconate 15 meq/ Multivitamins 10 ml/Chromium/ 

Copper/Manganese/ Seleni/Zn 0.5 ml/ Insulin Human Regular 20 unit/ Total 

Parenteral Nutrition/Amino Acids/Dextrose/ Fat Emulsion Intravenous 1,400 ml @  

58.333 mls/ hr TPN  CONT IV  Last administered on 3/31/20at 21:36;  Start 

3/31/20 at 22:00;  Stop 4/1/20 at 21:59;  Status DC


Alteplase, Recombinant (Cathflo For Central Catheter Clearance) 1 mg 1X  ONCE 

INT CAT  Last administered on 3/31/20at 20:03;  Start 3/31/20 at 19:30;  Stop 

3/31/20 at 19:46;  Status DC


Alteplase, Recombinant (Cathflo For Central Catheter Clearance) 1 mg 1X  ONCE 

INT CAT  Last administered on 3/31/20at 22:05;  Start 3/31/20 at 22:00;  Stop 3/

31/20 at 22:01;  Status DC


Sodium Chloride 90 meq/Potassium Chloride 15 meq/ Potassium Phosphate 18 mmol/ 

Magnesium Sulfate 8 meq/Calcium Gluconate 15 meq/ Multivitamins 10 ml/Chromium/ 

Copper/Manganese/ Seleni/Zn 0.5 ml/ Insulin Human Regular 20 unit/ Total 

Parenteral Nutrition/Amino Acids/Dextrose/ Fat Emulsion Intravenous 1,400 ml @  

58.333 mls/ hr TPN  CONT IV  Last administered on 4/1/20at 21:30;  Start 4/1/20 

at 22:00;  Stop 4/2/20 at 21:59;  Status DC


Dexmedetomidine HCl 400 mcg/ Sodium Chloride 100 ml @ 0 mls/hr CONT  PRN IV 

ANXIETY / AGITATION Last administered on 5/21/20at 13:18;  Start 4/2/20 at 08:15


Sodium Chloride 500 ml @  500 mls/hr 1X PRN  PRN IV ELEVATED BP, SEE COMMENTS;  

Start 4/2/20 at 08:15


Atropine Sulfate (ATROPINE 0.5mg SYRINGE) 0.5 mg PRN Q5MIN  PRN IV SEE COMMENTS;

 Start 4/2/20 at 08:15


Furosemide (Lasix) 20 mg 1X  ONCE IVP  Last administered on 4/2/20at 08:19;  

Start 4/2/20 at 08:15;  Stop 4/2/20 at 08:16;  Status DC


Lidocaine HCl (Buffered Lidocaine 1%) 3 ml STK-MED ONCE .ROUTE ;  Start 4/2/20 

at 08:39;  Stop 4/2/20 at 08:39;  Status DC


Lidocaine HCl (Buffered Lidocaine 1%) 6 ml 1X  ONCE INJ  Last administered on 

4/2/20at 09:05;  Start 4/2/20 at 09:00;  Stop 4/2/20 at 09:06;  Status DC


Sodium Chloride 90 meq/Potassium Chloride 15 meq/ Potassium Phosphate 18 mmol/ 

Magnesium Sulfate 8 meq/Calcium Gluconate 15 meq/ Multivitamins 10 ml/Chromium/ 

Copper/Manganese/ Seleni/Zn 0.5 ml/ Insulin Human Regular 20 unit/ Total 

Parenteral Nutrition/Amino Acids/Dextrose/ Fat Emulsion Intravenous 1,400 ml @  

58.333 mls/ hr TPN  CONT IV  Last administered on 4/2/20at 22:45;  Start 4/2/20 

at 22:00;  Stop 4/3/20 at 21:59;  Status DC


Sodium Chloride 1,000 ml @  1,000 mls/hr Q1H PRN IV hypotension;  Start 4/3/20 

at 07:30;  Stop 4/3/20 at 13:29;  Status DC


Albumin Human 200 ml @  200 mls/hr 1X PRN  PRN IV Hypotension Last administered 

on 4/3/20at 09:36;  Start 4/3/20 at 07:30;  Stop 4/3/20 at 13:29;  Status DC


Sodium Chloride (Normal Saline Flush) 10 ml 1X PRN  PRN IV AP catheter pack;  

Start 4/3/20 at 07:30;  Stop 4/3/20 at 21:29;  Status DC


Sodium Chloride (Normal Saline Flush) 10 ml 1X PRN  PRN IV  catheter pack;  

Start 4/3/20 at 07:30;  Stop 4/4/20 at 07:29;  Status DC


Sodium Chloride 1,000 ml @  400 mls/hr Q2H30M PRN IV PATENCY;  Start 4/3/20 at 

07:30;  Stop 4/3/20 at 19:29;  Status DC


Info (PHARMACY MONITORING -- do not chart) 1 each PRN DAILY  PRN MC SEE 

COMMENTS;  Start 4/3/20 at 07:30;  Stop 4/3/20 at 13:02;  Status DC


Info (PHARMACY MONITORING -- do not chart) 1 each PRN DAILY  PRN MC SEE COMMENT

S;  Start 4/3/20 at 07:30;  Stop 4/5/20 at 12:45;  Status DC


Sodium Chloride 90 meq/Potassium Chloride 15 meq/ Potassium Phosphate 10 mmol/ 

Magnesium Sulfate 8 meq/Calcium Gluconate 15 meq/ Multivitamins 10 ml/Chromium/ 

Copper/Manganese/ Seleni/Zn 0.5 ml/ Insulin Human Regular 25 unit/ Total 

Parenteral Nutrition/Amino Acids/Dextrose/ Fat Emulsion Intravenous 1,400 ml @  

58.333 mls/ hr TPN  CONT IV  Last administered on 4/3/20at 22:19;  Start 4/3/20 

at 22:00;  Stop 4/4/20 at 21:59;  Status DC


Heparin Sodium (Porcine) (Heparin Sodium) 5,000 unit Q12HR SQ  Last administered

on 4/26/20at 08:59;  Start 4/3/20 at 21:00;  Stop 4/26/20 at 10:05;  Status DC


Ondansetron HCl (Zofran) 4 mg PRN Q6HRS  PRN IV NAUSEA/VOMITING;  Start 4/6/20 

at 07:00;  Stop 4/7/20 at 06:59;  Status DC


Fentanyl Citrate (Fentanyl 2ml Vial) 25 mcg PRN Q5MIN  PRN IV MILD PAIN 1-3;  

Start 4/6/20 at 07:00;  Stop 4/7/20 at 06:59;  Status DC


Fentanyl Citrate (Fentanyl 2ml Vial) 50 mcg PRN Q5MIN  PRN IV MODERATE TO SEVERE

PAIN;  Start 4/6/20 at 07:00;  Stop 4/7/20 at 06:59;  Status DC


Ringer's Solution 1,000 ml @  30 mls/hr Q24H IV ;  Start 4/6/20 at 07:00;  Stop 

4/6/20 at 18:59;  Status DC


Lidocaine HCl (Xylocaine-Mpf 1% 2ml Vial) 2 ml PRN 1X  PRN ID PRIOR TO IV START;

 Start 4/6/20 at 07:00;  Stop 4/7/20 at 06:59;  Status DC


Prochlorperazine Edisylate (Compazine) 5 mg PACU PRN  PRN IV NAUSEA, MRX1;  

Start 4/6/20 at 07:00;  Stop 4/7/20 at 06:59;  Status DC


Sodium Chloride 1,000 ml @  1,000 mls/hr Q1H PRN IV hypotension;  Start 4/4/20 

at 09:10;  Stop 4/4/20 at 15:09;  Status DC


Albumin Human 200 ml @  200 mls/hr 1X PRN  PRN IV Hypotension Last administered 

on 4/4/20at 10:10;  Start 4/4/20 at 09:15;  Stop 4/4/20 at 15:14;  Status DC


Sodium Chloride 1,000 ml @  400 mls/hr Q2H30M PRN IV PATENCY;  Start 4/4/20 at 

09:10;  Stop 4/4/20 at 21:09;  Status DC


Info (PHARMACY MONITORING -- do not chart) 1 each PRN DAILY  PRN MC SEE COMMENTS

;  Start 4/4/20 at 09:15;  Stop 4/5/20 at 12:45;  Status DC


Info (PHARMACY MONITORING -- do not chart) 1 each PRN DAILY  PRN MC SEE 

COMMENTS;  Start 4/4/20 at 09:15;  Stop 4/5/20 at 12:45;  Status DC


Sodium Chloride 90 meq/Potassium Chloride 15 meq/ Potassium Phosphate 10 mmol/ 

Magnesium Sulfate 8 meq/Calcium Gluconate 15 meq/ Multivitamins 10 ml/Chromium/ 

Copper/Manganese/ Seleni/Zn 0.5 ml/ Insulin Human Regular 25 unit/ Total 

Parenteral Nutrition/Amino Acids/Dextrose/ Fat Emulsion Intravenous 1,400 ml @  

58.333 mls/ hr TPN  CONT IV  Last administered on 4/4/20at 22:10;  Start 4/4/20 

at 22:00;  Stop 4/5/20 at 21:59;  Status DC


Magnesium Sulfate 50 ml @ 25 mls/hr PRN DAILY  PRN IV for Mag < 1.7 on am labs 

Last administered on 4/20/20at 17:27;  Start 4/5/20 at 09:15


Sodium Chloride 90 meq/Potassium Chloride 15 meq/ Potassium Phosphate 10 mmol/ 

Magnesium Sulfate 8 meq/Calcium Gluconate 15 meq/ Multivitamins 10 ml/Chromium/ 

Copper/Manganese/ Seleni/Zn 0.5 ml/ Insulin Human Regular 25 unit/ Total 

Parenteral Nutrition/Amino Acids/Dextrose/ Fat Emulsion Intravenous 1,400 ml @  

58.333 mls/ hr TPN  CONT IV  Last administered on 4/5/20at 21:20;  Start 4/5/20 

at 22:00;  Stop 4/6/20 at 21:59;  Status DC


Sodium Chloride 1,000 ml @  1,000 mls/hr Q1H PRN IV hypotension;  Start 4/5/20 

at 12:23;  Stop 4/5/20 at 18:22;  Status DC


Albumin Human 200 ml @  200 mls/hr 1X  ONCE IV  Last administered on 4/5/20at 

13:34;  Start 4/5/20 at 12:30;  Stop 4/5/20 at 13:29;  Status DC


Diphenhydramine HCl (Benadryl) 25 mg 1X PRN  PRN IV ITCHING;  Start 4/5/20 at 

12:30;  Stop 4/6/20 at 12:29;  Status DC


Diphenhydramine HCl (Benadryl) 25 mg 1X PRN  PRN IV ITCHING;  Start 4/5/20 at 

12:30;  Stop 4/6/20 at 12:29;  Status DC


Info (PHARMACY MONITORING -- do not chart) 1 each PRN DAILY  PRN MC SEE COMM

ENTS;  Start 4/5/20 at 12:30;  Status Cancel


Bupivacaine HCl/ Epinephrine Bitart (Sensorcain-Epi 0.5%-1:[id number] Mpf) 30 ml 

STK-MED ONCE .ROUTE  Last administered on 4/6/20at 11:44;  Start 4/6/20 at 

11:00;  Stop 4/6/20 at 11:01;  Status DC


Cellulose (Surgicel Fibrillar 1x2) 1 each STK-MED ONCE .ROUTE ;  Start 4/6/20 at

11:00;  Stop 4/6/20 at 11:01;  Status DC


Sodium Chloride 90 meq/Potassium Chloride 15 meq/ Potassium Phosphate 10 mmol/ 

Magnesium Sulfate 12 meq/Calcium Gluconate 15 meq/ Multivitamins 10 ml/Chromium/

Copper/Manganese/ Seleni/Zn 0.5 ml/ Insulin Human Regular 25 unit/ Total 

Parenteral Nutrition/Amino Acids/Dextrose/ Fat Emulsion Intravenous 1,400 ml @  

58.333 mls/ hr TPN  CONT IV  Last administered on 4/6/20at 22:24;  Start 4/6/20 

at 22:00;  Stop 4/7/20 at 21:59;  Status DC


Propofol 20 ml @ As Directed STK-MED ONCE IV ;  Start 4/6/20 at 11:07;  Stop 

4/6/20 at 11:07;  Status DC


Cellulose (Surgicel Hemostat 4x8) 1 each STK-MED ONCE .ROUTE  Last administered 

on 4/6/20at 11:44;  Start 4/6/20 at 11:55;  Stop 4/6/20 at 11:56;  Status DC


Sevoflurane (Ultane) 60 ml STK-MED ONCE IH ;  Start 4/6/20 at 12:46;  Stop 

4/6/20 at 12:46;  Status DC


Sodium Chloride 1,000 ml @  1,000 mls/hr Q1H PRN IV hypotension;  Start 4/6/20 

at 13:51;  Stop 4/6/20 at 19:50;  Status DC


Albumin Human 200 ml @  200 mls/hr 1X PRN  PRN IV Hypotension Last administered 

on 4/6/20at 14:51;  Start 4/6/20 at 14:00;  Stop 4/6/20 at 19:59;  Status DC


Diphenhydramine HCl (Benadryl) 25 mg 1X PRN  PRN IV ITCHING;  Start 4/6/20 at 

14:00;  Stop 4/7/20 at 13:59;  Status DC


Diphenhydramine HCl (Benadryl) 25 mg 1X PRN  PRN IV ITCHING;  Start 4/6/20 at 

14:00;  Stop 4/7/20 at 13:59;  Status DC


Sodium Chloride 1,000 ml @  400 mls/hr Q2H30M PRN IV PATENCY;  Start 4/6/20 at 

13:51;  Stop 4/7/20 at 01:50;  Status DC


Info (PHARMACY MONITORING -- do not chart) 1 each PRN DAILY  PRN MC SEE 

COMMENTS;  Start 4/6/20 at 14:00;  Stop 4/9/20 at 08:16;  Status DC


Heparin Sodium (Porcine) (Hep Lock Adult) 500 unit STK-MED ONCE IVP ;  Start 

4/7/20 at 09:29;  Stop 4/7/20 at 09:30;  Status DC


Sodium Chloride 1,000 ml @  1,000 mls/hr Q1H PRN IV hypotension;  Start 4/7/20 

at 10:43;  Stop 4/7/20 at 16:42;  Status DC


Sodium Chloride 1,000 ml @  400 mls/hr Q2H30M PRN IV PATENCY;  Start 4/7/20 at 

10:43;  Stop 4/7/20 at 22:42;  Status DC


Info (PHARMACY MONITORING -- do not chart) 1 each PRN DAILY  PRN MC SEE 

COMMENTS;  Start 4/7/20 at 10:45;  Status UNV


Info (PHARMACY MONITORING -- do not chart) 1 each PRN DAILY  PRN MC SEE 

COMMENTS;  Start 4/7/20 at 10:45;  Status UNV


Sodium Chloride 90 meq/Potassium Chloride 15 meq/ Magnesium Sulfate 12 

meq/Calcium Gluconate 15 meq/ Multivitamins 10 ml/Chromium/ Copper/Manganese/ 

Seleni/Zn 0.5 ml/ Insulin Human Regular 25 unit/ Total Parenteral 

Nutrition/Amino Acids/Dextrose/ Fat Emulsion Intravenous 1,400 ml @  58.333 mls/

hr TPN  CONT IV  Last administered on 4/7/20at 22:13;  Start 4/7/20 at 22:00;  

Stop 4/8/20 at 21:59;  Status DC


Sodium Chloride 1,000 ml @  1,000 mls/hr Q1H PRN IV hypotension;  Start 4/8/20 

at 07:50;  Stop 4/8/20 at 13:49;  Status DC


Albumin Human 200 ml @  200 mls/hr 1X  ONCE IV ;  Start 4/8/20 at 08:00;  Stop 

4/8/20 at 08:53;  Status DC


Diphenhydramine HCl (Benadryl) 25 mg 1X PRN  PRN IV ITCHING;  Start 4/8/20 at 

08:00;  Stop 4/9/20 at 07:59;  Status DC


Diphenhydramine HCl (Benadryl) 25 mg 1X PRN  PRN IV ITCHING;  Start 4/8/20 at 

08:00;  Stop 4/9/20 at 07:59;  Status DC


Info (PHARMACY MONITORING -- do not chart) 1 each PRN DAILY  PRN MC SEE 

COMMENTS;  Start 4/8/20 at 08:00;  Stop 4/9/20 at 08:16;  Status DC


Albumin Human 50 ml @ 50 mls/hr 1X  ONCE IV ;  Start 4/8/20 at 08:53;  Stop 

4/8/20 at 08:56;  Status DC


Albumin Human 200 ml @  50 mls/hr PRN 1X  PRN IV HYPOTENSION Last administered 

on 4/14/20at 11:54;  Start 4/8/20 at 09:00;  Stop 5/21/20 at 11:14;  Status DC


Meropenem 500 mg/ Sodium Chloride 50 ml @  100 mls/hr Q12H IV  Last administered

on 4/28/20at 10:45;  Start 4/8/20 at 10:00;  Stop 4/28/20 at 12:37;  Status DC


Sodium Chloride 90 meq/Magnesium Sulfate 12 meq/ Calcium Gluconate 15 meq/ 

Multivitamins 10 ml/Chromium/ Copper/Manganese/ Seleni/Zn 0.5 ml/ Insulin Human 

Regular 25 unit/ Total Parenteral Nutrition/Amino Acids/Dextrose/ Fat Emulsion 

Intravenous 1,400 ml @  58.333 mls/ hr TPN  CONT IV  Last administered on 

4/8/20at 21:41;  Start 4/8/20 at 22:00;  Stop 4/9/20 at 21:59;  Status DC


Sodium Chloride 1,000 ml @  1,000 mls/hr Q1H PRN IV hypotension;  Start 4/9/20 

at 07:58;  Stop 4/9/20 at 13:57;  Status DC


Albumin Human 200 ml @  200 mls/hr 1X PRN  PRN IV Hypotension Last administered 

on 4/9/20at 09:30;  Start 4/9/20 at 08:00;  Stop 4/9/20 at 13:59;  Status DC


Sodium Chloride 1,000 ml @  400 mls/hr Q2H30M PRN IV PATENCY;  Start 4/9/20 at 

07:58;  Stop 4/9/20 at 19:57;  Status DC


Info (PHARMACY MONITORING -- do not chart) 1 each PRN DAILY  PRN MC SEE 

COMMENTS;  Start 4/9/20 at 08:00;  Status Cancel


Info (PHARMACY MONITORING -- do not chart) 1 each PRN DAILY  PRN MC SEE 

COMMENTS;  Start 4/9/20 at 08:15;  Status UNV


Sodium Chloride 90 meq/Potassium Phosphate 5 mmol/ Magnesium Sulfate 12 

meq/Calcium Gluconate 15 meq/ Multivitamins 10 ml/Chromium/ Copper/Manganese/ 

Seleni/Zn 0.5 ml/ Insulin Human Regular 30 unit/ Total Parenteral 

Nutrition/Amino Acids/Dextrose/ Fat Emulsion Intravenous 1,400 ml @  58.333 mls/

hr TPN  CONT IV  Last administered on 4/9/20at 22:08;  Start 4/9/20 at 22:00;  

Stop 4/10/20 at 21:59;  Status DC


Linezolid/Dextrose 300 ml @  300 mls/hr Q12HR IV  Last administered on 4/20/20at

20:40;  Start 4/10/20 at 11:00;  Stop 4/21/20 at 08:10;  Status DC


Sodium Chloride 90 meq/Potassium Phosphate 15 mmol/ Magnesium Sulfate 12 

meq/Calcium Gluconate 15 meq/ Multivitamins 10 ml/Chromium/ Copper/Manganese/ 

Seleni/Zn 0.5 ml/ Insulin Human Regular 30 unit/ Total Parenteral 

Nutrition/Amino Acids/Dextrose/ Fat Emulsion Intravenous 1,400 ml @  58.333 mls/

hr TPN  CONT IV  Last administered on 4/10/20at 21:49;  Start 4/10/20 at 22:00; 

Stop 4/11/20 at 21:59;  Status DC


Sodium Chloride 90 meq/Potassium Phosphate 15 mmol/ Magnesium Sulfate 12 

meq/Calcium Gluconate 15 meq/ Multivitamins 10 ml/Chromium/ Copper/Manganese/ 

Seleni/Zn 0.5 ml/ Insulin Human Regular 40 unit/ Total Parenteral 

Nutrition/Amino Acids/Dextrose/ Fat Emulsion Intravenous 1,400 ml @  58.333 mls/

hr TPN  CONT IV  Last administered on 4/11/20at 21:21;  Start 4/11/20 at 22:00; 

Stop 4/12/20 at 21:59;  Status DC


Sodium Chloride 1,000 ml @  1,000 mls/hr Q1H PRN IV hypotension;  Start 4/11/20 

at 13:26;  Stop 4/11/20 at 19:25;  Status DC


Albumin Human 200 ml @  200 mls/hr 1X PRN  PRN IV Hypotension Last administered 

on 4/11/20at 15:00;  Start 4/11/20 at 13:30;  Stop 4/11/20 at 19:29;  Status DC


Sodium Chloride (Normal Saline Flush) 10 ml 1X PRN  PRN IV AP catheter pack;  

Start 4/11/20 at 13:30;  Stop 4/12/20 at 13:29;  Status DC


Sodium Chloride (Normal Saline Flush) 10 ml 1X PRN  PRN IV  catheter pack;  

Start 4/11/20 at 13:30;  Stop 4/12/20 at 13:29;  Status DC


Sodium Chloride 1,000 ml @  400 mls/hr Q2H30M PRN IV PATENCY;  Start 4/11/20 at 

13:26;  Stop 4/12/20 at 01:25;  Status DC


Info (PHARMACY MONITORING -- do not chart) 1 each PRN DAILY  PRN MC SEE 

COMMENTS;  Start 4/11/20 at 13:30;  Stop 4/11/20 at 13:33;  Status DC


Info (PHARMACY MONITORING -- do not chart) 1 each PRN DAILY  PRN MC SEE 

COMMENTS;  Start 4/11/20 at 13:30;  Stop 4/11/20 at 13:34;  Status DC


Sodium Chloride 90 meq/Potassium Phosphate 19 mmol/ Magnesium Sulfate 12 

meq/Calcium Gluconate 15 meq/ Multivitamins 10 ml/Chromium/ Copper/Manganese/ 

Seleni/Zn 0.5 ml/ Insulin Human Regular 40 unit/ Total Parenteral 

Nutrition/Amino Acids/Dextrose/ Fat Emulsion Intravenous 1,400 ml @  58.333 mls/

hr TPN  CONT IV  Last administered on 4/12/20at 21:54;  Start 4/12/20 at 22:00; 

Stop 4/13/20 at 21:59;  Status DC


Sodium Chloride 1,000 ml @  1,000 mls/hr Q1H PRN IV hypotension;  Start 4/13/20 

at 09:35;  Stop 4/13/20 at 15:34;  Status DC


Albumin Human 200 ml @  200 mls/hr 1X PRN  PRN IV Hypotension;  Start 4/13/20 at

09:45;  Stop 4/13/20 at 15:44;  Status DC


Diphenhydramine HCl (Benadryl) 25 mg 1X PRN  PRN IV ITCHING;  Start 4/13/20 at 

09:45;  Stop 4/14/20 at 09:44;  Status DC


Diphenhydramine HCl (Benadryl) 25 mg 1X PRN  PRN IV ITCHING;  Start 4/13/20 at 

09:45;  Stop 4/14/20 at 09:44;  Status DC


Sodium Chloride 1,000 ml @  400 mls/hr Q2H30M PRN IV PATENCY;  Start 4/13/20 at 

09:35;  Stop 4/13/20 at 21:34;  Status DC


Info (PHARMACY MONITORING -- do not chart) 1 each PRN DAILY  PRN MC SEE 

COMMENTS;  Start 4/13/20 at 09:45;  Status Cancel


Sodium Chloride 100 meq/Potassium Phosphate 19 mmol/ Magnesium Sulfate 12 

meq/Calcium Gluconate 15 meq/ Multivitamins 10 ml/Chromium/ Copper/Manganese/ 

Seleni/Zn 0.5 ml/ Insulin Human Regular 40 unit/ Potassium Chloride 20 meq/ 

Total Parenteral Nutrition/Amino Acids/Dextrose/ Fat Emulsion Intravenous 1,400 

ml @  58.333 mls/ hr TPN  CONT IV  Last administered on 4/13/20at 22:02;  Start 

4/13/20 at 22:00;  Stop 4/14/20 at 21:59;  Status DC


Furosemide (Lasix) 40 mg 1X  ONCE IVP  Last administered on 4/13/20at 14:39;  

Start 4/13/20 at 14:30;  Stop 4/13/20 at 14:31;  Status DC


Metronidazole 100 ml @  100 mls/hr Q8HRS IV  Last administered on 4/21/20at 

06:04;  Start 4/14/20 at 10:00;  Stop 4/21/20 at 08:10;  Status DC


Sodium Chloride 1,000 ml @  1,000 mls/hr Q1H PRN IV hypotension;  Start 4/14/20 

at 08:00;  Stop 4/14/20 at 13:59;  Status DC


Albumin Human 200 ml @  200 mls/hr 1X PRN  PRN IV Hypotension;  Start 4/14/20 at

08:00;  Stop 4/14/20 at 13:59;  Status DC


Sodium Chloride 1,000 ml @  400 mls/hr Q2H30M PRN IV PATENCY;  Start 4/14/20 at 

08:00;  Stop 4/14/20 at 19:59;  Status DC


Info (PHARMACY MONITORING -- do not chart) 1 each PRN DAILY  PRN MC SEE 

COMMENTS;  Start 4/14/20 at 11:30;  Status UNV


Info (PHARMACY MONITORING -- do not chart) 1 each PRN DAILY  PRN MC SEE 

COMMENTS;  Start 4/14/20 at 11:30;  Stop 4/16/20 at 12:13;  Status DC


Sodium Chloride 100 meq/Potassium Phosphate 19 mmol/ Magnesium Sulfate 12 

meq/Calcium Gluconate 15 meq/ Multivitamins 10 ml/Chromium/ Copper/Manganese/ 

Seleni/Zn 0.5 ml/ Insulin Human Regular 40 unit/ Potassium Chloride 20 meq/ 

Total Parenteral Nutrition/Amino Acids/Dextrose/ Fat Emulsion Intravenous 1,400 

ml @  58.333 mls/ hr TPN  CONT IV  Last administered on 4/14/20at 21:52;  Start 

4/14/20 at 22:00;  Stop 4/15/20 at 21:59;  Status DC


Sodium Chloride (Normal Saline Flush) 10 ml QSHIFT  PRN IV AFTER MEDS AND BLOOD 

DRAWS;  Start 4/14/20 at 15:00;  Stop 5/12/20 at 11:27;  Status DC


Sodium Chloride (Normal Saline Flush) 10 ml PRN Q5MIN  PRN IV AFTER MEDS AND 

BLOOD DRAWS;  Start 4/14/20 at 15:00


Sodium Chloride (Normal Saline Flush) 20 ml PRN Q5MIN  PRN IV AFTER MEDS AND 

BLOOD DRAWS;  Start 4/14/20 at 15:00


Sodium Chloride 100 meq/Potassium Phosphate 19 mmol/ Magnesium Sulfate 12 

meq/Calcium Gluconate 15 meq/ Multivitamins 10 ml/Chromium/ Copper/Manganese/ 

Seleni/Zn 0.5 ml/ Insulin Human Regular 40 unit/ Potassium Chloride 20 meq/ 

Total Parenteral Nutrition/Amino Acids/Dextrose/ Fat Emulsion Intravenous 1,400 

ml @  58.333 mls/ hr TPN  CONT IV  Last administered on 4/15/20at 21:20;  Start 

4/15/20 at 22:00;  Stop 4/16/20 at 21:59;  Status DC


Lidocaine HCl (Buffered Lidocaine 1%) 3 ml STK-MED ONCE .ROUTE ;  Start 4/15/20 

at 13:16;  Stop 4/15/20 at 13:16;  Status DC


Lidocaine HCl (Buffered Lidocaine 1%) 6 ml 1X  ONCE INJ  Last administered on 

4/15/20at 13:45;  Start 4/15/20 at 13:30;  Stop 4/15/20 at 13:31;  Status DC


Albumin Human 100 ml @  100 mls/hr 1X  ONCE IV  Last administered on 4/15/20at 

15:41;  Start 4/15/20 at 15:00;  Stop 4/15/20 at 15:59;  Status DC


Albumin Human 50 ml @ 50 mls/hr 1X  ONCE IV  Last administered on 4/15/20at 

15:00;  Start 4/15/20 at 15:00;  Stop 4/15/20 at 15:59;  Status DC


Info (PHARMACY MONITORING -- do not chart) 1 each PRN DAILY  PRN MC SEE 

COMMENTS;  Start 4/16/20 at 11:30;  Status Cancel


Info (PHARMACY MONITORING -- do not chart) 1 each PRN DAILY  PRN MC SEE 

COMMENTS;  Start 4/16/20 at 11:30;  Status UNV


Sodium Chloride 100 meq/Potassium Phosphate 10 mmol/ Magnesium Sulfate 12 

meq/Calcium Gluconate 15 meq/ Multivitamins 10 ml/Chromium/ Copper/Manganese/ 

Seleni/Zn 0.5 ml/ Insulin Human Regular 35 unit/ Potassium Chloride 20 meq/ 

Total Parenteral Nutrition/Amino Acids/Dextrose/ Fat Emulsion Intravenous 1,400 

ml @  58.333 mls/ hr TPN  CONT IV  Last administered on 4/16/20at 22:10;  Start 

4/16/20 at 22:00;  Stop 4/17/20 at 21:59;  Status DC


Sodium Chloride 100 meq/Potassium Phosphate 5 mmol/ Magnesium Sulfate 12 

meq/Calcium Gluconate 15 meq/ Multivitamins 10 ml/Chromium/ Copper/Manganese/ 

Seleni/Zn 0.5 ml/ Insulin Human Regular 35 unit/ Potassium Chloride 20 meq/ 

Total Parenteral Nutrition/Amino Acids/Dextrose/ Fat Emulsion Intravenous 1,400 

ml @  58.333 mls/ hr TPN  CONT IV  Last administered on 4/17/20at 22:59;  Start 

4/17/20 at 22:00;  Stop 4/18/20 at 21:59;  Status DC


Sodium Chloride 1,000 ml @  1,000 mls/hr Q1H PRN IV hypotension;  Start 4/18/20 

at 08:27;  Stop 4/18/20 at 14:26;  Status DC


Albumin Human 200 ml @  200 mls/hr 1X PRN  PRN IV Hypotension Last administered 

on 4/18/20at 09:18;  Start 4/18/20 at 08:30;  Stop 4/18/20 at 14:29;  Status DC


Sodium Chloride 1,000 ml @  400 mls/hr Q2H30M PRN IV PATENCY;  Start 4/18/20 at 

08:27;  Stop 4/18/20 at 20:26;  Status DC


Info (PHARMACY MONITORING -- do not chart) 1 each PRN DAILY  PRN MC SEE 

COMMENTS;  Start 4/18/20 at 08:30;  Status Cancel


Info (PHARMACY MONITORING -- do not chart) 1 each PRN DAILY  PRN MC SEE 

COMMENTS;  Start 4/18/20 at 08:30;  Stop 4/26/20 at 13:10;  Status DC


Sodium Chloride 100 meq/Potassium Chloride 40 meq/ Magnesium Sulfate 15 

meq/Calcium Gluconate 15 meq/ Multivitamins 10 ml/Chromium/ Copper/Manganese/ 

Seleni/Zn 0.5 ml/ Insulin Human Regular 35 unit/ Total Parenteral Nutriti

on/Amino Acids/Dextrose/ Fat Emulsion Intravenous 1,400 ml @  58.333 mls/ hr TPN

 CONT IV  Last administered on 4/18/20at 22:00;  Start 4/18/20 at 22:00;  Stop 

4/19/20 at 21:59;  Status DC


Potassium Chloride/Water 100 ml @  100 mls/hr 1X  ONCE IV  Last administered on 

4/18/20at 17:28;  Start 4/18/20 at 14:45;  Stop 4/18/20 at 15:44;  Status DC


Sodium Chloride 100 meq/Potassium Chloride 40 meq/ Magnesium Sulfate 15 

meq/Calcium Gluconate 15 meq/ Multivitamins 10 ml/Chromium/ Copper/Manganese/ 

Seleni/Zn 0.5 ml/ Insulin Human Regular 35 unit/ Total Parenteral 

Nutrition/Amino Acids/Dextrose/ Fat Emulsion Intravenous 1,400 ml @  58.333 mls/

hr TPN  CONT IV  Last administered on 4/19/20at 22:46;  Start 4/19/20 at 22:00; 

Stop 4/20/20 at 21:59;  Status DC


Sodium Chloride 100 meq/Potassium Chloride 40 meq/ Magnesium Sulfate 20 

meq/Calcium Gluconate 15 meq/ Multivitamins 10 ml/Chromium/ Copper/Manganese/ 

Seleni/Zn 0.5 ml/ Insulin Human Regular 35 unit/ Total Parenteral 

Nutrition/Amino Acids/Dextrose/ Fat Emulsion Intravenous 1,400 ml @  58.333 mls/

hr TPN  CONT IV  Last administered on 4/20/20at 22:31;  Start 4/20/20 at 22:00; 

Stop 4/21/20 at 21:59;  Status DC


Fentanyl Citrate (Fentanyl 2ml Vial) 50 mcg PRN Q2HR  PRN IVP PAIN Last 

administered on 4/27/20at 13:32;  Start 4/20/20 at 21:00;  Stop 4/28/20 at 

12:53;  Status DC


Fentanyl Citrate (Fentanyl 2ml Vial) 25 mcg PRN Q2HR  PRN IVP PAIN;  Start 

4/20/20 at 21:00;  Stop 4/28/20 at 12:54;  Status DC


Enoxaparin Sodium (Lovenox 100mg Syringe) 100 mg Q12HR SQ ;  Start 4/21/20 at 

21:00;  Status UNV


Amino Acids/ Glycerin/ Electrolytes 1,000 ml @  75 mls/hr [id number] IV ;  Start 

4/20/20 at 21:15;  Status UNV


Sodium Chloride 1,000 ml @  1,000 mls/hr Q1H PRN IV hypotension;  Start 4/21/20 

at 07:56;  Stop 4/21/20 at 13:55;  Status DC


Albumin Human 200 ml @  200 mls/hr 1X PRN  PRN IV Hypotension Last administered 

on 4/21/20at 08:40;  Start 4/21/20 at 08:00;  Stop 4/21/20 at 13:59;  Status DC


Sodium Chloride 1,000 ml @  400 mls/hr Q2H30M PRN IV PATENCY;  Start 4/21/20 at 

07:56;  Stop 4/21/20 at 19:55;  Status DC


Info (PHARMACY MONITORING -- do not chart) 1 each PRN DAILY  PRN MC SEE 

COMMENTS;  Start 4/21/20 at 08:00;  Status UNV


Info (PHARMACY MONITORING -- do not chart) 1 each PRN DAILY  PRN MC SEE 

COMMENTS;  Start 4/21/20 at 08:00;  Status UNV


Daptomycin 430 mg/ Sodium Chloride 50 ml @  100 mls/hr Q24H IV  Last 

administered on 4/21/20at 12:35;  Start 4/21/20 at 09:00;  Stop 4/21/20 at 

12:49;  Status DC


Sodium Chloride 100 meq/Potassium Chloride 40 meq/ Magnesium Sulfate 20 

meq/Calcium Gluconate 15 meq/ Multivitamins 10 ml/Chromium/ Copper/Manganese/ 

Seleni/Zn 0.5 ml/ Insulin Human Regular 35 unit/ Total Parenteral 

Nutrition/Amino Acids/Dextrose/ Fat Emulsion Intravenous 1,400 ml @  58.333 mls/

hr TPN  CONT IV  Last administered on 4/21/20at 21:26;  Start 4/21/20 at 22:00; 

Stop 4/22/20 at 21:59;  Status DC


Daptomycin 430 mg/ Sodium Chloride 50 ml @  100 mls/hr Q48H IV ;  Start 4/23/20 

at 09:00;  Stop 4/22/20 at 11:55;  Status DC


Sodium Chloride 100 meq/Potassium Chloride 40 meq/ Magnesium Sulfate 20 

meq/Calcium Gluconate 15 meq/ Multivitamins 10 ml/Chromium/ Copper/Manganese/ 

Seleni/Zn 0.5 ml/ Insulin Human Regular 35 unit/ Total Parenteral 

Nutrition/Amino Acids/Dextrose/ Fat Emulsion Intravenous 1,400 ml @  58.333 mls/

hr TPN  CONT IV  Last administered on 4/22/20at 22:27;  Start 4/22/20 at 22:00; 

Stop 4/23/20 at 21:59;  Status DC


Daptomycin 430 mg/ Sodium Chloride 50 ml @  100 mls/hr Q24H IV  Last 

administered on 4/24/20at 15:07;  Start 4/22/20 at 13:00;  Stop 4/25/20 at 

13:15;  Status DC


Sodium Chloride 100 meq/Potassium Chloride 40 meq/ Magnesium Sulfate 20 

meq/Calcium Gluconate 10 meq/ Multivitamins 10 ml/Chromium/ Copper/Manganese/ 

Seleni/Zn 0.5 ml/ Insulin Human Regular 35 unit/ Total Parenteral 

Nutrition/Amino Acids/Dextrose/ Fat Emulsion Intravenous 1,400 ml @  58.333 mls/

hr TPN  CONT IV  Last administered on 4/24/20at 00:06;  Start 4/23/20 at 22:00; 

Stop 4/24/20 at 21:59;  Status DC


Alteplase, Recombinant (Cathflo For Central Catheter Clearance) 1 mg 1X  ONCE 

INT CAT  Last administered on 4/24/20at 11:44;  Start 4/24/20 at 10:45;  Stop 

4/24/20 at 10:46;  Status DC


Ondansetron HCl (Zofran) 4 mg PRN Q6HRS  PRN IV NAUSEA/VOMITING;  Start 4/27/20 

at 07:00;  Stop 4/28/20 at 06:59;  Status DC


Fentanyl Citrate (Fentanyl 2ml Vial) 25 mcg PRN Q5MIN  PRN IV MILD PAIN 1-3;  

Start 4/27/20 at 07:00;  Stop 4/28/20 at 06:59;  Status DC


Fentanyl Citrate (Fentanyl 2ml Vial) 50 mcg PRN Q5MIN  PRN IV MODERATE TO SEVERE

PAIN Last administered on 4/27/20at 10:17;  Start 4/27/20 at 07:00;  Stop 

4/28/20 at 06:59;  Status DC


Ringer's Solution 1,000 ml @  30 mls/hr Q24H IV ;  Start 4/27/20 at 07:00;  Stop

4/27/20 at 18:59;  Status DC


Lidocaine HCl (Xylocaine-Mpf 1% 2ml Vial) 2 ml PRN 1X  PRN ID PRIOR TO IV START;

 Start 4/27/20 at 07:00;  Stop 4/28/20 at 06:59;  Status DC


Prochlorperazine Edisylate (Compazine) 5 mg PACU PRN  PRN IV NAUSEA, MRX1;  

Start 4/27/20 at 07:00;  Stop 4/28/20 at 06:59;  Status DC


Sodium Acetate 50 meq/Potassium Acetate 55 meq/ Magnesium Sulfate 20 meq/Calcium

Gluconate 10 meq/ Multivitamins 10 ml/Chromium/ Copper/Manganese/ Seleni/Zn 0.5 

ml/ Insulin Human Regular 35 unit/ Total Parenteral Nutrition/Amino Acid

s/Dextrose/ Fat Emulsion Intravenous 1,400 ml @  58.333 mls/ hr TPN  CONT IV ;  

Start 4/24/20 at 22:00;  Stop 4/24/20 at 14:15;  Status DC


Sodium Acetate 50 meq/Potassium Acetate 55 meq/ Magnesium Sulfate 20 meq/Calcium

Gluconate 10 meq/ Multivitamins 10 ml/Chromium/ Copper/Manganese/ Seleni/Zn 0.5 

ml/ Insulin Human Regular 35 unit/ Total Parenteral Nutrition/Amino 

Acids/Dextrose/ Fat Emulsion Intravenous 1,800 ml @  75 mls/hr TPN  CONT IV  

Last administered on 4/24/20at 22:38;  Start 4/24/20 at 22:00;  Stop 4/25/20 at 

21:59;  Status DC


Sodium Chloride 1,000 ml @  1,000 mls/hr Q1H PRN IV hypotension;  Start 4/24/20 

at 15:31;  Stop 4/24/20 at 21:30;  Status DC


Diphenhydramine HCl (Benadryl) 25 mg 1X PRN  PRN IV ITCHING;  Start 4/24/20 at 

15:45;  Stop 4/25/20 at 15:44;  Status DC


Diphenhydramine HCl (Benadryl) 25 mg 1X PRN  PRN IV ITCHING;  Start 4/24/20 at 

15:45;  Stop 4/25/20 at 15:44;  Status DC


Sodium Chloride 1,000 ml @  400 mls/hr Q2H30M PRN IV PATENCY;  Start 4/24/20 at 

15:31;  Stop 4/25/20 at 03:30;  Status DC


Info (PHARMACY MONITORING -- do not chart) 1 each PRN DAILY  PRN MC SEE 

COMMENTS;  Start 4/24/20 at 15:45


Sodium Acetate 50 meq/Potassium Acetate 55 meq/ Magnesium Sulfate 20 meq/Calcium

Gluconate 10 meq/ Multivitamins 10 ml/Chromium/ Copper/Manganese/ Seleni/Zn 0.5 

ml/ Insulin Human Regular 35 unit/ Total Parenteral Nutrition/Amino 

Acids/Dextrose/ Fat Emulsion Intravenous 1,800 ml @  75 mls/hr TPN  CONT IV  

Last administered on 4/25/20at 22:03;  Start 4/25/20 at 22:00;  Stop 4/26/20 at 

21:59;  Status DC


Daptomycin 430 mg/ Sodium Chloride 50 ml @  100 mls/hr Q24H IV  Last 

administered on 4/30/20at 13:00;  Start 4/25/20 at 13:00;  Stop 4/30/20 at 

20:58;  Status DC


Heparin Sodium (Porcine) 1000 unit/Sodium Chloride 1,001 ml @  1,001 mls/hr 1X  

ONCE IRR ;  Start 4/27/20 at 06:00;  Stop 4/27/20 at 06:59;  Status DC


Potassium Acetate 55 meq/Magnesium Sulfate 20 meq/ Calcium Gluconate 10 meq/ 

Multivitamins 10 ml/Chromium/ Copper/Manganese/ Seleni/Zn 0.5 ml/ Insulin Human 

Regular 35 unit/ Total Parenteral Nutrition/Amino Acids/Dextrose/ Fat Emulsion 

Intravenous 1,920 ml @  80 mls/hr TPN  CONT IV  Last administered on 4/26/20at 

22:10;  Start 4/26/20 at 22:00;  Stop 4/27/20 at 21:59;  Status DC


Dexamethasone Sodium Phosphate (Decadron) 4 mg STK-MED ONCE .ROUTE ;  Start 

4/27/20 at 10:56;  Stop 4/27/20 at 10:57;  Status DC


Ondansetron HCl (Zofran) 4 mg STK-MED ONCE .ROUTE ;  Start 4/27/20 at 10:56;  

Stop 4/27/20 at 10:57;  Status DC


Rocuronium Bromide (Zemuron) 50 mg STK-MED ONCE .ROUTE ;  Start 4/27/20 at 

10:56;  Stop 4/27/20 at 10:57;  Status DC


Fentanyl Citrate (Fentanyl 2ml Vial) 100 mcg STK-MED ONCE .ROUTE ;  Start 

4/27/20 at 10:56;  Stop 4/27/20 at 10:57;  Status DC


Bupivacaine HCl/ Epinephrine Bitart (Sensorcain-Epi 0.5%-1:[id number] Mpf) 30 ml 

STK-MED ONCE .ROUTE  Last administered on 4/27/20at 12:01;  Start 4/27/20 at 

10:58;  Stop 4/27/20 at 10:58;  Status DC


Cellulose (Surgicel Hemostat 2x14) 1 each STK-MED ONCE .ROUTE ;  Start 4/27/20 

at 10:58;  Stop 4/27/20 at 10:59;  Status DC


Iohexol (Omnipaque 300 Mg/ml) 50 ml STK-MED ONCE .ROUTE ;  Start 4/27/20 at 

10:58;  Stop 4/27/20 at 10:59;  Status DC


Cellulose (Surgicel Hemostat 4x8) 1 each STK-MED ONCE .ROUTE ;  Start 4/27/20 at

10:58;  Stop 4/27/20 at 10:59;  Status DC


Bisacodyl (Dulcolax Supp) 10 mg STK-MED ONCE .ROUTE ;  Start 4/27/20 at 10:59;  

Stop 4/27/20 at 10:59;  Status DC


Heparin Sodium (Porcine) 1000 unit/Sodium Chloride 1,001 ml @  1,001 mls/hr 1X  

ONCE IRR ;  Start 4/27/20 at 12:00;  Stop 4/27/20 at 12:59;  Status DC


Propofol 20 ml @ As Directed STK-MED ONCE IV ;  Start 4/27/20 at 11:05;  Stop 

4/27/20 at 11:05;  Status DC


Sevoflurane (Ultane) 90 ml STK-MED ONCE IH ;  Start 4/27/20 at 11:05;  Stop 

4/27/20 at 11:05;  Status DC


Sevoflurane (Ultane) 60 ml STK-MED ONCE IH ;  Start 4/27/20 at 12:26;  Stop 4/27 /20 at 12:27;  Status DC


Propofol 20 ml @ As Directed STK-MED ONCE IV ;  Start 4/27/20 at 12:26;  Stop 

4/27/20 at 12:27;  Status DC


Phenylephrine HCl (PHENYLEPHRINE in 0.9% NACL PF) 1 mg STK-MED ONCE IV ;  Start 

4/27/20 at 12:34;  Stop 4/27/20 at 12:34;  Status DC


Heparin Sodium (Porcine) (Heparin Sodium) 5,000 unit Q12HR SQ  Last administered

on 5/6/20at 20:57;  Start 4/27/20 at 21:00;  Stop 5/7/20 at 09:59;  Status DC


Sodium Chloride (Normal Saline Flush) 3 ml QSHIFT  PRN IV AFTER MEDS AND BLOOD 

DRAWS;  Start 4/27/20 at 13:45


Naloxone HCl (Narcan) 0.4 mg PRN Q2MIN  PRN IV SEE INSTRUCTIONS;  Start 4/27/20 

at 13:45


Sodium Chloride 1,000 ml @  25 mls/hr Q24H IV  Last administered on 5/19/20at 

13:37;  Start 4/27/20 at 13:37


Naloxone HCl (Narcan) 0.4 mg PRN Q2MIN  PRN IV SEE INSTRUCTIONS;  Start 4/27/20 

at 14:30;  Status UNV


Sodium Chloride 1,000 ml @  25 mls/hr Q24H IV ;  Start 4/27/20 at 14:30;  Status

UNV


Hydromorphone HCl 30 ml @ 0 mls/hr CONT PRN  PRN IV PER PROTOCOL Last 

administered on 5/2/20at 16:08;  Start 4/27/20 at 14:30;  Stop 5/4/20 at 08:55; 

Status DC


Potassium Acetate 55 meq/Magnesium Sulfate 20 meq/ Calcium Gluconate 10 meq/ 

Multivitamins 10 ml/Chromium/ Copper/Manganese/ Seleni/Zn 0.5 ml/ Insulin Human 

Regular 35 unit/ Total Parenteral Nutrition/Amino Acids/Dextrose/ Fat Emulsion 

Intravenous 1,920 ml @  80 mls/hr TPN  CONT IV  Last administered on 4/27/20at 

22:01;  Start 4/27/20 at 22:00;  Stop 4/28/20 at 21:59;  Status DC


Bumetanide (Bumex) 2 mg [id number] IV  Last administered on 5/1/20at 13:50;  Start 

4/28/20 at 14:00;  Stop 5/2/20 at 14:10;  Status DC


Meropenem 1 gm/ Sodium Chloride 100 ml @  200 mls/hr Q8HRS IV  Last administered

on 5/21/20at 14:13;  Start 4/28/20 at 14:00


Potassium Acetate 55 meq/Magnesium Sulfate 20 meq/ Calcium Gluconate 10 meq/ 

Multivitamins 10 ml/Chromium/ Copper/Manganese/ Seleni/Zn 0.5 ml/ Insulin Human 

Regular 35 unit/ Total Parenteral Nutrition/Amino Acids/Dextrose/ Fat Emulsion 

Intravenous 1,920 ml @  80 mls/hr TPN  CONT IV  Last administered on 4/28/20at 

22:02;  Start 4/28/20 at 22:00;  Stop 4/29/20 at 21:59;  Status DC


Hydromorphone HCl (Dilaudid Standard PCA) 12 mg STK-MED ONCE IV ;  Start 4/27/20

at 14:35;  Stop 4/28/20 at 13:53;  Status DC


Artificial Tears (Artificial Tears) 1 drop PRN Q15MIN  PRN OU DRY EYE Last admi

nistered on 5/18/20at 08:08;  Start 4/29/20 at 05:30


Hydromorphone HCl (Dilaudid Standard PCA) 12 mg STK-MED ONCE IV ;  Start 4/28/20

at 12:05;  Stop 4/29/20 at 09:15;  Status DC


Potassium Acetate 65 meq/Magnesium Sulfate 20 meq/ Calcium Gluconate 10 meq/ 

Multivitamins 10 ml/Chromium/ Copper/Manganese/ Seleni/Zn 0.5 ml/ Insulin Human 

Regular 30 unit/ Total Parenteral Nutrition/Amino Acids/Dextrose/ Fat Emulsion 

Intravenous 1,920 ml @  80 mls/hr TPN  CONT IV  Last administered on 4/29/20at 

22:22;  Start 4/29/20 at 22:00;  Stop 4/30/20 at 21:59;  Status DC


Cyclobenzaprine HCl (Flexeril) 10 mg PRN Q6HRS  PRN PO MUSCLE SPASMS;  Start 4/ 30/20 at 10:45


Potassium Acetate 55 meq/Magnesium Sulfate 20 meq/ Calcium Gluconate 10 meq/ 

Multivitamins 10 ml/Chromium/ Copper/Manganese/ Seleni/Zn 0.5 ml/ Insulin Human 

Regular 30 unit/ Total Parenteral Nutrition/Amino Acids/Dextrose/ Fat Emulsion 

Intravenous 1,920 ml @  80 mls/hr TPN  CONT IV  Last administered on 5/1/20at 

01:00;  Start 4/30/20 at 22:00;  Stop 5/1/20 at 21:59;  Status DC


Magnesium Sulfate 50 ml @ 25 mls/hr 1X  ONCE IV  Last administered on 4/30/20at 

17:18;  Start 4/30/20 at 12:45;  Stop 4/30/20 at 14:44;  Status DC


Potassium Chloride/Water 100 ml @  100 mls/hr 1X  ONCE IV  Last administered on 

5/1/20at 11:27;  Start 5/1/20 at 12:00;  Stop 5/1/20 at 12:59;  Status DC


Hydromorphone HCl (Dilaudid Standard PCA) 12 mg STK-MED ONCE IV ;  Start 4/29/20

at 10:50;  Stop 5/1/20 at 11:02;  Status DC


Hydromorphone HCl (Dilaudid Standard PCA) 12 mg STK-MED ONCE IV ;  Start 4/30/20

at 13:47;  Stop 5/1/20 at 11:03;  Status DC


Potassium Acetate 30 meq/Magnesium Sulfate 20 meq/ Calcium Gluconate 10 meq/ M

ultivitamins 10 ml/Chromium/ Copper/Manganese/ Seleni/Zn 0.5 ml/ Insulin Human 

Regular 30 unit/ Potassium Chloride 30 meq/ Total Parenteral Nutrition/Amino 

Acids/Dextrose/ Fat Emulsion Intravenous 1,920 ml @  80 mls/hr TPN  CONT IV  

Last administered on 5/1/20at 22:34;  Start 5/1/20 at 22:00;  Stop 5/2/20 at 

21:59;  Status DC


Potassium Chloride/Water 100 ml @  100 mls/hr Q1H IV  Last administered on 

5/2/20at 13:05;  Start 5/2/20 at 07:00;  Stop 5/2/20 at 10:59;  Status DC


Magnesium Sulfate 50 ml @ 25 mls/hr 1X  ONCE IV  Last administered on 5/2/20at 

10:34;  Start 5/2/20 at 10:30;  Stop 5/2/20 at 12:29;  Status DC


Potassium Chloride 75 meq/ Magnesium Sulfate 20 meq/Calcium Gluconate 10 meq/ 

Multivitamins 10 ml/Chromium/ Copper/Manganese/ Seleni/Zn 0.5 ml/ Insulin Human 

Regular 30 unit/ Total Parenteral Nutrition/Amino Acids/Dextrose/ Fat Emulsion 

Intravenous 1,920 ml @  80 mls/hr TPN  CONT IV  Last administered on 5/2/20at 

21:51;  Start 5/2/20 at 22:00;  Stop 5/3/20 at 22:00;  Status DC


Potassium Chloride 75 meq/ Magnesium Sulfate 20 meq/Calcium Gluconate 10 meq/ 

Multivitamins 10 ml/Chromium/ Copper/Manganese/ Seleni/Zn 0.5 ml/ Insulin Human 

Regular 25 unit/ Total Parenteral Nutrition/Amino Acids/Dextrose/ Fat Emulsion 

Intravenous 1,920 ml @  80 mls/hr TPN  CONT IV  Last administered on 5/3/20at 

22:04;  Start 5/3/20 at 22:00;  Stop 5/4/20 at 21:59;  Status DC


Hydromorphone HCl (Dilaudid) 0.4 mg PRN Q4HRS  PRN IVP PAIN Last administered on

5/4/20at 10:57;  Start 5/4/20 at 09:00;  Stop 5/4/20 at 18:59;  Status DC


Micafungin Sodium 100 mg/Dextrose 100 ml @  100 mls/hr Q24H IV  Last 

administered on 5/21/20at 10:53;  Start 5/4/20 at 11:00


Daptomycin 485 mg/ Sodium Chloride 50 ml @  100 mls/hr Q24H IV  Last 

administered on 5/11/20at 13:10;  Start 5/4/20 at 11:00;  Stop 5/12/20 at 07:44;

 Status DC


Potassium Chloride 75 meq/ Magnesium Sulfate 15 meq/Calcium Gluconate 8 meq/ 

Multivitamins 10 ml/Chromium/ Copper/Manganese/ Seleni/Zn 0.5 ml/ Insulin Human 

Regular 25 unit/ Total Parenteral Nutrition/Amino Acids/Dextrose/ Fat Emulsion 

Intravenous 1,920 ml @  80 mls/hr TPN  CONT IV  Last administered on 5/4/20at 

23:08;  Start 5/4/20 at 22:00;  Stop 5/5/20 at 21:59;  Status DC


Haloperidol Lactate (Haldol Inj) 3 mg 1X  ONCE IVP  Last administered on 

5/4/20at 14:37;  Start 5/4/20 at 14:30;  Stop 5/4/20 at 14:31;  Status DC


Hydromorphone HCl (Dilaudid) 1 mg PRN Q4HRS  PRN IVP PAIN Last administered on 

5/18/20at 06:25;  Start 5/4/20 at 19:00;  Stop 5/18/20 at 17:10;  Status DC


Potassium Chloride 75 meq/ Magnesium Sulfate 15 meq/Calcium Gluconate 8 meq/ 

Multivitamins 10 ml/Chromium/ Copper/Manganese/ Seleni/Zn 0.5 ml/ Insulin Human 

Regular 20 unit/ Total Parenteral Nutrition/Amino Acids/Dextrose/ Fat Emulsion 

Intravenous 1,920 ml @  80 mls/hr TPN  CONT IV  Last administered on 5/5/20at 

22:10;  Start 5/5/20 at 22:00;  Stop 5/6/20 at 21:59;  Status DC


Lidocaine HCl (Buffered Lidocaine 1%) 3 ml STK-MED ONCE .ROUTE ;  Start 5/6/20 

at 11:31;  Stop 5/6/20 at 11:31;  Status DC


Lidocaine HCl (Buffered Lidocaine 1%) 3 ml STK-MED ONCE .ROUTE ;  Start 5/6/20 

at 12:28;  Stop 5/6/20 at 12:29;  Status DC


Lidocaine HCl (Buffered Lidocaine 1%) 6 ml 1X  ONCE INJ  Last administered on 

5/6/20at 12:53;  Start 5/6/20 at 12:45;  Stop 5/6/20 at 12:46;  Status DC


Potassium Chloride 75 meq/ Magnesium Sulfate 15 meq/Calcium Gluconate 8 meq/ 

Multivitamins 10 ml/Chromium/ Copper/Manganese/ Seleni/Zn 0.5 ml/ Insulin Human 

Regular 20 unit/ Total Parenteral Nutrition/Amino Acids/Dextrose/ Fat Emulsion 

Intravenous 1,920 ml @  80 mls/hr TPN  CONT IV  Last administered on 5/6/20at 

22:00;  Start 5/6/20 at 22:00;  Stop 5/7/20 at 21:59;  Status DC


Potassium Chloride 75 meq/ Magnesium Sulfate 15 meq/Calcium Gluconate 8 meq/ 

Multivitamins 10 ml/Chromium/ Copper/Manganese/ Seleni/Zn 0.5 ml/ Insulin Human 

Regular 15 unit/ Total Parenteral Nutrition/Amino Acids/Dextrose/ Fat Emulsion 

Intravenous 1,920 ml @  80 mls/hr TPN  CONT IV  Last administered on 5/7/20at 

22:28;  Start 5/7/20 at 22:00;  Stop 5/8/20 at 21:59;  Status DC


Vecuronium Bromide (Norcuron Bolus) 6 mg PRN Q6HRS  PRN IV VENT ASYNCHRONY;  

Start 5/7/20 at 19:15;  Stop 5/7/20 at 19:35;  Status DC


Bumetanide (Bumex) 2 mg 1X  ONCE IV  Last administered on 5/7/20at 22:09;  Start

5/7/20 at 19:45;  Stop 5/7/20 at 19:46;  Status DC


Lidocaine HCl (Buffered Lidocaine 1%) 3 ml STK-MED ONCE .ROUTE ;  Start 5/8/20 

at 07:59;  Stop 5/8/20 at 07:59;  Status DC


Midazolam HCl (Versed) 5 mg STK-MED ONCE .ROUTE ;  Start 5/8/20 at 08:36;  Stop 

5/8/20 at 08:36;  Status DC


Fentanyl Citrate (Fentanyl 5ml Vial) 250 mcg STK-MED ONCE .ROUTE ;  Start 5/8/20

at 08:36;  Stop 5/8/20 at 08:37;  Status DC


Lidocaine HCl (Buffered Lidocaine 1%) 3 ml 1X  ONCE IJ  Last administered on 

5/8/20at 09:30;  Start 5/8/20 at 09:15;  Stop 5/8/20 at 09:16;  Status DC


Midazolam HCl (Versed) 5 mg 1X  ONCE IV  Last administered on 5/8/20at 09:30;  

Start 5/8/20 at 09:15;  Stop 5/8/20 at 09:16;  Status DC


Fentanyl Citrate (Fentanyl 5ml Vial) 250 mcg 1X  ONCE IV  Last administered on 

5/8/20at 09:30;  Start 5/8/20 at 09:15;  Stop 5/8/20 at 09:16;  Status DC


Bumetanide (Bumex) 2 mg DAILY IV  Last administered on 5/18/20at 08:07;  Start 

5/8/20 at 10:00;  Stop 5/18/20 at 17:15;  Status DC


Potassium Chloride 75 meq/ Magnesium Sulfate 15 meq/ Multivitamins 10 

ml/Chromium/ Copper/Manganese/ Seleni/Zn 0.5 ml/ Insulin Human Regular 15 unit/ 

Total Parenteral Nutrition/Amino Acids/Dextrose/ Fat Emulsion Intravenous 1,920 

ml @  80 mls/hr TPN  CONT IV  Last administered on 5/8/20at 21:59;  Start 5/8/20

at 22:00;  Stop 5/9/20 at 21:59;  Status DC


Metoclopramide HCl (Reglan Vial) 10 mg PRN Q3HRS  PRN IVP NAUSEA/VOMITING-3rd 

choice Last administered on 5/14/20at 04:25;  Start 5/9/20 at 16:45


Potassium Chloride 75 meq/ Magnesium Sulfate 15 meq/ Multivitamins 10 

ml/Chromium/ Copper/Manganese/ Seleni/Zn 0.5 ml/ Insulin Human Regular 15 unit/ 

Total Parenteral Nutrition/Amino Acids/Dextrose/ Fat Emulsion Intravenous 1,920 

ml @  80 mls/hr TPN  CONT IV  Last administered on 5/9/20at 22:41;  Start 5/9/20

at 22:00;  Stop 5/10/20 at 21:59;  Status DC


Magnesium Sulfate 50 ml @ 25 mls/hr 1X  ONCE IV  Last administered on 5/10/20at 

10:44;  Start 5/10/20 at 09:00;  Stop 5/10/20 at 10:59;  Status DC


Potassium Chloride/Water 100 ml @  100 mls/hr 1X  ONCE IV  Last administered on 

5/10/20at 09:37;  Start 5/10/20 at 09:00;  Stop 5/10/20 at 09:59;  Status DC


Duloxetine HCl (Cymbalta) 30 mg DAILY PO  Last administered on 5/11/20at 09:48; 

Start 5/10/20 at 14:00;  Stop 5/13/20 at 10:25;  Status DC


Potassium Chloride 80 meq/ Magnesium Sulfate 20 meq/ Multivitamins 10 

ml/Chromium/ Copper/Manganese/ Seleni/Zn 0.5 ml/ Insulin Human Regular 15 unit/ 

Total Parenteral Nutrition/Amino Acids/Dextrose/ Fat Emulsion Intravenous 1,920 

ml @  80 mls/hr TPN  CONT IV  Last administered on 5/10/20at 21:42;  Start 

5/10/20 at 22:00;  Stop 5/11/20 at 21:59;  Status DC


Potassium Chloride 80 meq/ Magnesium Sulfate 20 meq/ Multivitamins 10 

ml/Chromium/ Copper/Manganese/ Seleni/Zn 0.5 ml/ Insulin Human Regular 15 unit/ 

Total Parenteral Nutrition/Amino Acids/Dextrose/ Fat Emulsion Intravenous 1,920 

ml @  80 mls/hr TPN  CONT IV  Last administered on 5/11/20at 22:20;  Start 

5/11/20 at 22:00;  Stop 5/12/20 at 21:59;  Status DC


Lidocaine HCl (Buffered Lidocaine 1%) 3 ml STK-MED ONCE .ROUTE ;  Start 5/12/20 

at 09:54;  Stop 5/12/20 at 09:55;  Status DC


Hydromorphone HCl (Dilaudid Standard PCA) 12 mg STK-MED ONCE IV ;  Start 5/1/20 

at 15:50;  Stop 5/12/20 at 11:24;  Status DC


Potassium Chloride 80 meq/ Magnesium Sulfate 20 meq/ Multivitamins 10 

ml/Chromium/ Copper/Manganese/ Seleni/Zn 0.5 ml/ Insulin Human Regular 15 unit/ 

Total Parenteral Nutrition/Amino Acids/Dextrose/ Fat Emulsion Intravenous 1,920 

ml @  80 mls/hr TPN  CONT IV  Last administered on 5/12/20at 21:40;  Start 5/12/ 20 at 22:00;  Stop 5/13/20 at 21:59;  Status DC


Lidocaine HCl (Buffered Lidocaine 1%) 6 ml 1X  ONCE INJ  Last administered on 5/ 12/20at 14:15;  Start 5/12/20 at 14:15;  Stop 5/12/20 at 14:16;  Status DC


Potassium Chloride 80 meq/ Magnesium Sulfate 20 meq/ Multivitamins 10 

ml/Chromium/ Copper/Manganese/ Seleni/Zn 1 ml/ Insulin Human Regular 15 unit/ 

Total Parenteral Nutrition/Amino Acids/Dextrose/ Fat Emulsion Intravenous 1,920 

ml @  80 mls/hr TPN  CONT IV  Last administered on 5/13/20at 22:04;  Start 

5/13/20 at 22:00;  Stop 5/14/20 at 21:59;  Status DC


Potassium Chloride/Water 100 ml @  100 mls/hr 1X  ONCE IV  Last administered on 

5/14/20at 11:34;  Start 5/14/20 at 11:00;  Stop 5/14/20 at 11:59;  Status DC


Potassium Chloride 90 meq/ Magnesium Sulfate 20 meq/ Multivitamins 10 

ml/Chromium/ Copper/Manganese/ Seleni/Zn 1 ml/ Insulin Human Regular 15 unit/ 

Total Parenteral Nutrition/Amino Acids/Dextrose/ Fat Emulsion Intravenous 1,920 

ml @  80 mls/hr TPN  CONT IV  Last administered on 5/14/20at 22:57;  Start 5/14/ 20 at 22:00;  Stop 5/15/20 at 21:59;  Status DC


Potassium Chloride 90 meq/ Magnesium Sulfate 20 meq/ Multivitamins 10 ml/Chromiu

m/ Copper/Manganese/ Seleni/Zn 1 ml/ Insulin Human Regular 15 unit/ Total 

Parenteral Nutrition/Amino Acids/Dextrose/ Fat Emulsion Intravenous 1,920 ml @  

80 mls/hr TPN  CONT IV  Last administered on 5/15/20at 22:48;  Start 5/15/20 at 

22:00;  Stop 5/16/20 at 21:59;  Status DC


Potassium Chloride 90 meq/ Magnesium Sulfate 20 meq/ Multivitamins 10 

ml/Chromium/ Copper/Manganese/ Seleni/Zn 1 ml/ Insulin Human Regular 15 unit/ 

Total Parenteral Nutrition/Amino Acids/Dextrose/ Fat Emulsion Intravenous 1,890 

ml @  78.75 mls/ hr TPN  CONT IV  Last administered on 5/16/20at 22:15;  Start 

5/16/20 at 22:00;  Stop 5/17/20 at 21:59;  Status DC


Linezolid/Dextrose 300 ml @  300 mls/hr Q12HR IV  Last administered on 5/19/20at

21:08;  Start 5/17/20 at 09:00;  Stop 5/20/20 at 08:11;  Status DC


Daptomycin 450 mg/ Sodium Chloride 50 ml @  100 mls/hr Q24H IV  Last 

administered on 5/20/20at 09:25;  Start 5/17/20 at 09:00;  Stop 5/21/20 at 

08:30;  Status DC


Potassium Chloride 90 meq/ Magnesium Sulfate 20 meq/ Multivitamins 10 ml/Chrom

ium/ Copper/Manganese/ Seleni/Zn 1 ml/ Insulin Human Regular 15 unit/ Total 

Parenteral Nutrition/Amino Acids/Dextrose/ Fat Emulsion Intravenous 1,890 ml @  

78.75 mls/ hr TPN  CONT IV  Last administered on 5/17/20at 21:34;  Start 5/17/20

at 22:00;  Stop 5/18/20 at 21:59;  Status DC


Lorazepam (Ativan Inj) 2 mg STK-MED ONCE .ROUTE ;  Start 5/17/20 at 14:58;  Stop

5/17/20 at 14:58;  Status DC


Metoprolol Tartrate (Lopressor Vial) 5 mg 1X  ONCE IVP  Last administered on 

5/17/20at 15:31;  Start 5/17/20 at 15:15;  Stop 5/17/20 at 15:16;  Status DC


Lorazepam (Ativan Inj) 2 mg 1X  ONCE IVP  Last administered on 5/17/20at 15:30; 

Start 5/17/20 at 15:15;  Stop 5/17/20 at 15:16;  Status DC


Enoxaparin Sodium (Lovenox 40mg Syringe) 40 mg Q24H SQ  Last administered on 

5/20/20at 16:19;  Start 5/17/20 at 17:00


Lorazepam (Ativan Inj) 1 mg PRN Q4HRS  PRN IVP ANXIETY / AGITATION MILD-MOD;  

Start 5/17/20 at 19:15


Lorazepam (Ativan Inj) 2 mg PRN Q4HRS  PRN IVP ANXIETY / AGITATION SEVERE Last 

administered on 5/18/20at 22:20;  Start 5/17/20 at 19:15


Fentanyl Citrate (Fentanyl 2ml Vial) 50 mcg PRN Q4HRS  PRN IVP SEVERE PAIN Last 

administered on 5/20/20at 05:00;  Start 5/18/20 at 13:15


Fentanyl Citrate (Fentanyl 2ml Vial) 25 mcg PRN Q4HRS  PRN IVP MODERATE PAIN 

Last administered on 5/21/20at 13:02;  Start 5/18/20 at 13:15


Potassium Chloride 90 meq/ Magnesium Sulfate 20 meq/ Multivitamins 10 

ml/Chromium/ Copper/Manganese/ Seleni/Zn 1 ml/ Insulin Human Regular 15 unit/ 

Total Parenteral Nutrition/Amino Acids/Dextrose/ Fat Emulsion Intravenous 1,890 

ml @  78.75 mls/ hr TPN  CONT IV  Last administered on 5/18/20at 22:18;  Start 

5/18/20 at 22:00;  Stop 5/19/20 at 21:59;  Status DC


Furosemide (Lasix) 40 mg 1X  ONCE IVP  Last administered on 5/18/20at 21:51;  

Start 5/18/20 at 21:45;  Stop 5/18/20 at 21:48;  Status DC


Albumin Human 100 ml @  100 mls/hr 1X PRN  PRN IV SEE COMMENTS;  Start 5/19/20 

at 01:30


Furosemide (Lasix) 40 mg [id number] IVP  Last administered on 5/21/20at 14:16;  Start

5/19/20 at 14:00


Potassium Chloride 90 meq/ Magnesium Sulfate 20 meq/ Multivitamins 10 

ml/Chromium/ Copper/Manganese/ Seleni/Zn 1 ml/ Insulin Human Regular 15 unit/ 

Total Parenteral Nutrition/Amino Acids/Dextrose/ Fat Emulsion Intravenous 1,800 

ml @  75 mls/hr TPN  CONT IV  Last administered on 5/19/20at 22:31;  Start 5 /19/20 at 22:00;  Stop 5/20/20 at 21:59;  Status DC


Potassium Chloride 90 meq/ Magnesium Sulfate 20 meq/ Multivitamins 10 ml/Chr

omium/ Copper/Manganese/ Seleni/Zn 1 ml/ Insulin Human Regular 15 unit/ Total 

Parenteral Nutrition/Amino Acids/Dextrose/ Fat Emulsion Intravenous 1,800 ml @  

75 mls/hr TPN  CONT IV  Last administered on 5/20/20at 22:28;  Start 5/20/20 at 

22:00;  Stop 5/21/20 at 21:59


Potassium Chloride 110 meq/ Magnesium Sulfate 20 meq/ Multivitamins 10 

ml/Chromium/ Copper/Manganese/ Seleni/Zn 1 ml/ Insulin Human Regular 15 unit/ 

Total Parenteral Nutrition/Amino Acids/Dextrose/ Fat Emulsion Intravenous 1,800 

ml @  75 mls/hr TPN  CONT IV ;  Start 5/21/20 at 22:00;  Stop 5/22/20 at 21:59


Saliva Substitute (Biotene Moisturizing Mouth) 2 spray PRN Q15MIN  PRN PO DRY 

MOUTH;  Start 5/21/20 at 11:00





Active Scripts


Active


Reported


Bisoprolol Fumarate 5 Mg Tablet 10 Mg PO DAILY


Vitals/I & O





Vital Sign - Last 24 Hours








 5/20/20 5/20/20 5/20/20 5/20/20





 16:00 16:00 16:18 16:48


 


Pulse 96   


 


Resp 18  26 28


 


B/P (MAP) 128/72 (90)   


 


Pulse Ox 100  95 96


 


O2 Delivery Tracheal Collar Trach Collar Tracheal Collar 


 


O2 Flow Rate 6.0 6.0 6.0 6.0





 5/20/20 5/20/20 5/20/20 5/20/20





 17:00 18:00 19:00 20:00


 


Temp 98.2   98.3





 98.2   98.3


 


Pulse 86 74 88 102


 


Resp 24 28 20 34


 


B/P (MAP) 122/70 (87) 110/64 (79) 108/79 (89) 176/84 (114)


 


Pulse Ox 97 98 98 100


 


O2 Delivery Tracheal Collar Tracheal Collar Tracheal Collar Tracheal Collar


 


O2 Flow Rate 6.0 6.0 8.0 8.0


 


    





    





 5/20/20 5/20/20 5/20/20 5/20/20





 20:00 20:48 21:00 22:00


 


Pulse   92 92


 


Resp   26 23


 


B/P (MAP)   104/58 (73) 109/57 (74)


 


Pulse Ox  100 98 98


 


O2 Delivery Trach Collar Tracheal Collar Tracheal Collar Tracheal Collar


 


O2 Flow Rate 8.0  8.0 8.0





 5/20/20 5/21/20 5/21/20 5/21/20





 23:00 00:00 00:07 00:20


 


Temp  98.6  





  98.6  


 


Pulse 98 92  


 


Resp 37 33  


 


B/P (MAP) 151/73 (99) 153/78 (103)  


 


Pulse Ox 98 100  98


 


O2 Delivery Tracheal Collar Tracheal Collar Trach Collar Tracheal Collar


 


O2 Flow Rate 8.0 8.0 8.0 


 


    





    





 5/21/20 5/21/20 5/21/20 5/21/20





 01:00 02:00 03:00 04:00


 


Temp    98.5





    98.5


 


Pulse 90 90 88 86


 


Resp 29 24 21 23


 


B/P (MAP) 130/91 (104) 111/72 (85) 111/72 (85) 113/71 (85)


 


Pulse Ox 100 100 100 100


 


O2 Delivery Tracheal Collar Tracheal Collar Tracheal Collar Tracheal Collar


 


O2 Flow Rate 8.0 8.0 8.0 8.0


 


    





    





 5/21/20 5/21/20 5/21/20 5/21/20





 04:05 04:20 04:50 05:00


 


Pulse    88


 


Resp   26 26


 


B/P (MAP)    134/73 (93)


 


Pulse Ox  100 100 100


 


O2 Delivery Trach Collar Tracheal Collar Tracheal Collar Tracheal Collar


 


O2 Flow Rate 8.0  8.0 8.0





 5/21/20 5/21/20 5/21/20 5/21/20





 05:20 06:00 07:00 08:00


 


Temp    98.1





    98.1


 


Pulse  84 80 82


 


Resp 18 20 20 21


 


B/P (MAP)  105/57 (73) 104/62 (76) 95/54 (68)


 


Pulse Ox  100 96 100


 


O2 Delivery Tracheal Collar Tracheal Collar Tracheal Collar Tracheal Collar


 


O2 Flow Rate 8.0 8.0 8.0 8.0


 


    





    





 5/21/20 5/21/20 5/21/20 5/21/20





 08:00 08:00 09:00 09:08


 


Pulse   92 


 


Resp   26 28


 


B/P (MAP)   116/73 (87) 


 


Pulse Ox  100 100 99


 


O2 Delivery Trach Collar Tracheal Collar Tracheal Collar Tracheal Collar


 


O2 Flow Rate 8.0  8.0 8.0





 5/21/20 5/21/20 5/21/20 5/21/20





 09:45 10:00 11:00 11:51


 


Pulse  92 108 


 


Resp 26 24 28 


 


B/P (MAP)  125/72 (89) 154/78 (103) 


 


Pulse Ox 96 100 96 


 


O2 Delivery Tracheal Collar Tracheal Collar Tracheal Collar Trach Collar


 


O2 Flow Rate 8.0 8.0 8.0 8.0





 5/21/20 5/21/20 5/21/20 5/21/20





 12:00 13:00 13:02 13:38


 


Temp 98.4   





 98.4   


 


Pulse 110 116  


 


Resp 33 41 40 36


 


B/P (MAP) 163/80 (107) 164/85 (111)  


 


Pulse Ox 96 96 94 94


 


O2 Delivery Tracheal Collar Tracheal Collar Tracheal Collar Tracheal Collar


 


O2 Flow Rate 8.0 8.0 8.0 8.0


 


    





    





 5/21/20   





 14:00   


 


Pulse 94   


 


Resp 18   


 


B/P (MAP) 148/86 (106)   


 


Pulse Ox 96   


 


O2 Delivery Tracheal Collar   


 


O2 Flow Rate 8.0   














Intake and Output   


 


 5/20/20 5/20/20 5/21/20





 15:00 23:00 07:00


 


Intake Total  0 ml 0 ml


 


Output Total 2035 ml 1070 ml 1460 ml


 


Balance -2035 ml -1070 ml -1460 ml











Hemodynamically unstable?:  No


Is patient in severe pain?:  No


Is NPO status required?:  Yes











MG ARGUETA MD                 May 21, 2020 15:25

## 2020-05-21 NOTE — PDOC
Subjective:


Subjective:


Doing okay.





Objective:


Objective:


She was able to spend a few minutes outside with her nurse yesterday.


Vital Signs:





                                   Vital Signs








  Date Time  Temp Pulse Resp B/P (MAP) Pulse Ox O2 Delivery O2 Flow Rate FiO2


 


5/21/20 09:08   28  99 Tracheal Collar 8.0 


 


5/21/20 09:00  92  116/73 (87)    


 


5/21/20 08:00 98.1       





 98.1       








Labs:





Laboratory Tests








Test


 5/20/20


12:31 5/20/20


16:28 5/21/20


00:44 5/21/20


06:10


 


Glucose (Fingerstick) 258 mg/dL  141 mg/dL  165 mg/dL  


 


White Blood Count    8.3 x10^3/uL 


 


Red Blood Count    2.72 x10^6/uL 


 


Hemoglobin    7.8 g/dL 


 


Hematocrit    23.8 % 


 


Mean Corpuscular Volume    87 fL 


 


Mean Corpuscular Hemoglobin    29 pg 


 


Mean Corpuscular Hemoglobin


Concent 


 


 


 33 g/dL 





 


Red Cell Distribution Width    18.2 % 


 


Platelet Count    380 x10^3/uL 


 


Sodium Level    139 mmol/L 


 


Potassium Level    4.0 mmol/L 


 


Chloride Level    100 mmol/L 


 


Carbon Dioxide Level    39 mmol/L 


 


Anion Gap    0 


 


Blood Urea Nitrogen    24 mg/dL 


 


Creatinine    0.7 mg/dL 


 


Estimated GFR


(Cockcroft-Gault) 


 


 


 88.9 





 


Glucose Level    118 mg/dL 


 


Calcium Level    8.6 mg/dL 


 


Phosphorus Level    3.0 mg/dL 


 


Magnesium Level    1.9 mg/dL 


 


Test


 5/21/20


06:22 


 


 





 


Glucose (Fingerstick) 122 mg/dL    











  BLOOD CULTURE  Preliminary  


        NO GROWTH AFTER 4 DAYS





PE:





GEN: NAD - looks much better compared to earlier this week


HEENT: NG bilious


LUNGS: trach collar


HEART: RRR


ABD: soft


NEURO/PSYCH: A & O 3





A/P:


Gallstone pancreatitis, MOSF





--


Continue support.





Hemodynamically unstable?:  No


Is patient in severe pain?:  No


Is NPO status required?:  Yes











RAGHAVENDRA MURCIA         May 21, 2020 10:14

## 2020-05-21 NOTE — PDOC
SURGICAL PROGRESS NOTE


Subjective


Pt resting quietly


Vital Signs





Vital Signs








  Date Time  Temp Pulse Resp B/P (MAP) Pulse Ox O2 Delivery O2 Flow Rate FiO2


 


5/21/20 09:08   28  99 Tracheal Collar 8.0 


 


5/21/20 09:00  92  116/73 (87)    


 


5/21/20 08:00 98.1       





 98.1       








I&O











Intake and Output 


 


 5/21/20





 07:00


 


Intake Total 0 ml


 


Output Total 4565 ml


 


Balance -4565 ml


 


 


 


Intake Oral 0 ml


 


Output Urine Total 3705 ml


 


Gastric Drainage Total 450 ml


 


Drainage Total 410 ml


 


# Bowel Movements 1








General:  No acute distress


Labs





Laboratory Tests








Test


 5/19/20


12:29 5/19/20


17:42 5/19/20


23:41 5/20/20


06:45


 


Glucose (Fingerstick)


 251 mg/dL


(70-99) 122 mg/dL


(70-99) 207 mg/dL


(70-99) 





 


Sodium Level


 


 


 


 140 mmol/L


(136-145)


 


Potassium Level


 


 


 


 3.9 mmol/L


(3.5-5.1)


 


Chloride Level


 


 


 


 99 mmol/L


()


 


Carbon Dioxide Level


 


 


 


 41 mmol/L


(21-32)


 


Anion Gap    0 (6-14) 


 


Blood Urea Nitrogen


 


 


 


 30 mg/dL


(7-20)


 


Creatinine


 


 


 


 0.7 mg/dL


(0.6-1.0)


 


Estimated GFR


(Cockcroft-Gault) 


 


 


 88.9 





 


Glucose Level


 


 


 


 147 mg/dL


(70-99)


 


Calcium Level


 


 


 


 8.8 mg/dL


(8.5-10.1)


 


Test


 5/20/20


06:53 5/20/20


09:30 5/20/20


12:31 5/20/20


16:28


 


Glucose (Fingerstick)


 146 mg/dL


(70-99) 


 258 mg/dL


(70-99) 141 mg/dL


(70-99)


 


O2 Saturation  97 % (92-99)   


 


Arterial Blood pH


 


 7.50


(7.35-7.45) 


 





 


Arterial Blood pCO2 at


Patient Temp 


 54 mmHg


(35-46) 


 





 


Arterial Blood pO2 at Patient


Temp 


 106 mmHg


() 


 





 


Arterial Blood HCO3


 


 41 mmol/L


(21-28) 


 





 


Arterial Blood Base Excess


 


 16 mmol/L


(-3-3) 


 





 


FiO2  30% trial   


 


Test


 5/21/20


00:44 5/21/20


06:10 5/21/20


06:22 





 


Glucose (Fingerstick)


 165 mg/dL


(70-99) 


 122 mg/dL


(70-99) 





 


White Blood Count


 


 8.3 x10^3/uL


(4.0-11.0) 


 





 


Red Blood Count


 


 2.72 x10^6/uL


(3.50-5.40) 


 





 


Hemoglobin


 


 7.8 g/dL


(12.0-15.5) 


 





 


Hematocrit


 


 23.8 %


(36.0-47.0) 


 





 


Mean Corpuscular Volume  87 fL ()   


 


Mean Corpuscular Hemoglobin  29 pg (25-35)   


 


Mean Corpuscular Hemoglobin


Concent 


 33 g/dL


(31-37) 


 





 


Red Cell Distribution Width


 


 18.2 %


(11.5-14.5) 


 





 


Platelet Count


 


 380 x10^3/uL


(140-400) 


 





 


Sodium Level


 


 139 mmol/L


(136-145) 


 





 


Potassium Level


 


 4.0 mmol/L


(3.5-5.1) 


 





 


Chloride Level


 


 100 mmol/L


() 


 





 


Carbon Dioxide Level


 


 39 mmol/L


(21-32) 


 





 


Anion Gap  0 (6-14)   


 


Blood Urea Nitrogen


 


 24 mg/dL


(7-20) 


 





 


Creatinine


 


 0.7 mg/dL


(0.6-1.0) 


 





 


Estimated GFR


(Cockcroft-Gault) 


 88.9 


 


 





 


Glucose Level


 


 118 mg/dL


(70-99) 


 





 


Calcium Level


 


 8.6 mg/dL


(8.5-10.1) 


 





 


Phosphorus Level


 


 3.0 mg/dL


(2.6-4.7) 


 





 


Magnesium Level


 


 1.9 mg/dL


(1.8-2.4) 


 











Laboratory Tests








Test


 5/20/20


12:31 5/20/20


16:28 5/21/20


00:44 5/21/20


06:10


 


Glucose (Fingerstick)


 258 mg/dL


(70-99) 141 mg/dL


(70-99) 165 mg/dL


(70-99) 





 


White Blood Count


 


 


 


 8.3 x10^3/uL


(4.0-11.0)


 


Red Blood Count


 


 


 


 2.72 x10^6/uL


(3.50-5.40)


 


Hemoglobin


 


 


 


 7.8 g/dL


(12.0-15.5)


 


Hematocrit


 


 


 


 23.8 %


(36.0-47.0)


 


Mean Corpuscular Volume    87 fL () 


 


Mean Corpuscular Hemoglobin    29 pg (25-35) 


 


Mean Corpuscular Hemoglobin


Concent 


 


 


 33 g/dL


(31-37)


 


Red Cell Distribution Width


 


 


 


 18.2 %


(11.5-14.5)


 


Platelet Count


 


 


 


 380 x10^3/uL


(140-400)


 


Sodium Level


 


 


 


 139 mmol/L


(136-145)


 


Potassium Level


 


 


 


 4.0 mmol/L


(3.5-5.1)


 


Chloride Level


 


 


 


 100 mmol/L


()


 


Carbon Dioxide Level


 


 


 


 39 mmol/L


(21-32)


 


Anion Gap    0 (6-14) 


 


Blood Urea Nitrogen


 


 


 


 24 mg/dL


(7-20)


 


Creatinine


 


 


 


 0.7 mg/dL


(0.6-1.0)


 


Estimated GFR


(Cockcroft-Gault) 


 


 


 88.9 





 


Glucose Level


 


 


 


 118 mg/dL


(70-99)


 


Calcium Level


 


 


 


 8.6 mg/dL


(8.5-10.1)


 


Phosphorus Level


 


 


 


 3.0 mg/dL


(2.6-4.7)


 


Magnesium Level


 


 


 


 1.9 mg/dL


(1.8-2.4)


 


Test


 5/21/20


06:22 


 


 





 


Glucose (Fingerstick)


 122 mg/dL


(70-99) 


 


 











Problem List


Problems


Medical Problems:


(1) Acute pancreatitis


Status: Acute  





(2) Cholelithiasis


Status: Acute  








Assessment/Plan


Severe pancreatitis


improving


cont supportive care











GAMAL ZHOU MD             May 21, 2020 10:04

## 2020-05-21 NOTE — NUR
SS following up with discharge planning. SS reviewed pt chart and discussed with pt RN. Pt 
remains on trach collar. Pt has NG tube, three ROBERT drains, and IV antibiotics. Pt showing 
improvements with PT/OT, but having some difficulties with anxiety. [facility] reporting that they 
are attempting to reach pt's daughter for additional needed information for disability 
application. SS will continue to follow for discharge planning.

## 2020-05-22 VITALS — DIASTOLIC BLOOD PRESSURE: 65 MMHG | SYSTOLIC BLOOD PRESSURE: 123 MMHG

## 2020-05-22 VITALS — DIASTOLIC BLOOD PRESSURE: 53 MMHG | SYSTOLIC BLOOD PRESSURE: 106 MMHG

## 2020-05-22 VITALS — DIASTOLIC BLOOD PRESSURE: 69 MMHG | SYSTOLIC BLOOD PRESSURE: 141 MMHG

## 2020-05-22 VITALS — DIASTOLIC BLOOD PRESSURE: 82 MMHG | SYSTOLIC BLOOD PRESSURE: 149 MMHG

## 2020-05-22 VITALS — SYSTOLIC BLOOD PRESSURE: 122 MMHG | DIASTOLIC BLOOD PRESSURE: 77 MMHG

## 2020-05-22 VITALS — DIASTOLIC BLOOD PRESSURE: 71 MMHG | SYSTOLIC BLOOD PRESSURE: 104 MMHG

## 2020-05-22 VITALS — SYSTOLIC BLOOD PRESSURE: 136 MMHG | DIASTOLIC BLOOD PRESSURE: 64 MMHG

## 2020-05-22 VITALS — DIASTOLIC BLOOD PRESSURE: 67 MMHG | SYSTOLIC BLOOD PRESSURE: 118 MMHG

## 2020-05-22 VITALS — SYSTOLIC BLOOD PRESSURE: 126 MMHG | DIASTOLIC BLOOD PRESSURE: 67 MMHG

## 2020-05-22 VITALS — SYSTOLIC BLOOD PRESSURE: 140 MMHG | DIASTOLIC BLOOD PRESSURE: 72 MMHG

## 2020-05-22 VITALS — DIASTOLIC BLOOD PRESSURE: 55 MMHG | SYSTOLIC BLOOD PRESSURE: 98 MMHG

## 2020-05-22 VITALS — DIASTOLIC BLOOD PRESSURE: 63 MMHG | SYSTOLIC BLOOD PRESSURE: 114 MMHG

## 2020-05-22 VITALS — SYSTOLIC BLOOD PRESSURE: 149 MMHG | DIASTOLIC BLOOD PRESSURE: 80 MMHG

## 2020-05-22 VITALS — DIASTOLIC BLOOD PRESSURE: 75 MMHG | SYSTOLIC BLOOD PRESSURE: 112 MMHG

## 2020-05-22 VITALS — DIASTOLIC BLOOD PRESSURE: 66 MMHG | SYSTOLIC BLOOD PRESSURE: 146 MMHG

## 2020-05-22 VITALS — SYSTOLIC BLOOD PRESSURE: 136 MMHG | DIASTOLIC BLOOD PRESSURE: 74 MMHG

## 2020-05-22 VITALS — DIASTOLIC BLOOD PRESSURE: 86 MMHG | SYSTOLIC BLOOD PRESSURE: 117 MMHG

## 2020-05-22 VITALS — SYSTOLIC BLOOD PRESSURE: 136 MMHG | DIASTOLIC BLOOD PRESSURE: 79 MMHG

## 2020-05-22 VITALS — SYSTOLIC BLOOD PRESSURE: 102 MMHG | DIASTOLIC BLOOD PRESSURE: 60 MMHG

## 2020-05-22 VITALS — SYSTOLIC BLOOD PRESSURE: 113 MMHG | DIASTOLIC BLOOD PRESSURE: 61 MMHG

## 2020-05-22 VITALS — SYSTOLIC BLOOD PRESSURE: 129 MMHG | DIASTOLIC BLOOD PRESSURE: 78 MMHG

## 2020-05-22 VITALS — DIASTOLIC BLOOD PRESSURE: 63 MMHG | SYSTOLIC BLOOD PRESSURE: 126 MMHG

## 2020-05-22 VITALS — SYSTOLIC BLOOD PRESSURE: 118 MMHG | DIASTOLIC BLOOD PRESSURE: 59 MMHG

## 2020-05-22 VITALS — DIASTOLIC BLOOD PRESSURE: 68 MMHG | SYSTOLIC BLOOD PRESSURE: 124 MMHG

## 2020-05-22 LAB
ANION GAP SERPL CALC-SCNC: 5 MMOL/L (ref 6–14)
BUN SERPL-MCNC: 22 MG/DL (ref 7–20)
CALCIUM SERPL-MCNC: 9.3 MG/DL (ref 8.5–10.1)
CHLORIDE SERPL-SCNC: 99 MMOL/L (ref 98–107)
CO2 SERPL-SCNC: 36 MMOL/L (ref 21–32)
CREAT SERPL-MCNC: 0.6 MG/DL (ref 0.6–1)
ERYTHROCYTE [DISTWIDTH] IN BLOOD BY AUTOMATED COUNT: 18.7 % (ref 11.5–14.5)
GFR SERPLBLD BASED ON 1.73 SQ M-ARVRAT: 106.3 ML/MIN
GLUCOSE SERPL-MCNC: 126 MG/DL (ref 70–99)
HCT VFR BLD CALC: 24.4 % (ref 36–47)
HGB BLD-MCNC: 7.9 G/DL (ref 12–15.5)
MAGNESIUM SERPL-MCNC: 2.1 MG/DL (ref 1.8–2.4)
MCH RBC QN AUTO: 29 PG (ref 25–35)
MCHC RBC AUTO-ENTMCNC: 33 G/DL (ref 31–37)
MCV RBC AUTO: 88 FL (ref 79–100)
PHOSPHATE SERPL-MCNC: 3.7 MG/DL (ref 2.6–4.7)
PLATELET # BLD AUTO: 348 X10^3/UL (ref 140–400)
POTASSIUM SERPL-SCNC: 4.3 MMOL/L (ref 3.5–5.1)
RBC # BLD AUTO: 2.77 X10^6/UL (ref 3.5–5.4)
SODIUM SERPL-SCNC: 140 MMOL/L (ref 136–145)
WBC # BLD AUTO: 8.8 X10^3/UL (ref 4–11)

## 2020-05-22 RX ADMIN — DEXMEDETOMIDINE HYDROCHLORIDE PRN MLS/HR: 100 INJECTION, SOLUTION, CONCENTRATE INTRAVENOUS at 00:23

## 2020-05-22 RX ADMIN — MEROPENEM SCH MLS/HR: 1 INJECTION, POWDER, FOR SOLUTION INTRAVENOUS at 05:53

## 2020-05-22 RX ADMIN — DEXTROSE SCH MLS/HR: 50 INJECTION, SOLUTION INTRAVENOUS at 11:17

## 2020-05-22 RX ADMIN — FENTANYL CITRATE PRN MCG: 50 INJECTION INTRAMUSCULAR; INTRAVENOUS at 18:05

## 2020-05-22 RX ADMIN — BACITRACIN SCH MLS/HR: 5000 INJECTION, POWDER, FOR SOLUTION INTRAMUSCULAR at 13:48

## 2020-05-22 RX ADMIN — FUROSEMIDE SCH MG: 10 INJECTION, SOLUTION INTRAMUSCULAR; INTRAVENOUS at 08:31

## 2020-05-22 RX ADMIN — DEXMEDETOMIDINE HYDROCHLORIDE PRN MLS/HR: 100 INJECTION, SOLUTION, CONCENTRATE INTRAVENOUS at 14:23

## 2020-05-22 RX ADMIN — INSULIN LISPRO SCH UNITS: 100 INJECTION, SOLUTION INTRAVENOUS; SUBCUTANEOUS at 11:30

## 2020-05-22 RX ADMIN — FENTANYL CITRATE PRN MCG: 50 INJECTION INTRAMUSCULAR; INTRAVENOUS at 22:22

## 2020-05-22 RX ADMIN — FENTANYL CITRATE PRN MCG: 50 INJECTION INTRAMUSCULAR; INTRAVENOUS at 05:59

## 2020-05-22 RX ADMIN — DEXMEDETOMIDINE HYDROCHLORIDE PRN MLS/HR: 100 INJECTION, SOLUTION, CONCENTRATE INTRAVENOUS at 22:20

## 2020-05-22 RX ADMIN — PANTOPRAZOLE SODIUM SCH MG: 40 INJECTION, POWDER, FOR SOLUTION INTRAVENOUS at 08:32

## 2020-05-22 RX ADMIN — INSULIN LISPRO SCH UNITS: 100 INJECTION, SOLUTION INTRAVENOUS; SUBCUTANEOUS at 18:04

## 2020-05-22 RX ADMIN — ENOXAPARIN SODIUM SCH MG: 40 INJECTION SUBCUTANEOUS at 18:05

## 2020-05-22 RX ADMIN — FUROSEMIDE SCH MG: 10 INJECTION, SOLUTION INTRAMUSCULAR; INTRAVENOUS at 14:23

## 2020-05-22 RX ADMIN — DEXMEDETOMIDINE HYDROCHLORIDE PRN MLS/HR: 100 INJECTION, SOLUTION, CONCENTRATE INTRAVENOUS at 08:13

## 2020-05-22 RX ADMIN — INSULIN LISPRO SCH UNITS: 100 INJECTION, SOLUTION INTRAVENOUS; SUBCUTANEOUS at 06:06

## 2020-05-22 RX ADMIN — Medication PRN EACH: at 12:07

## 2020-05-22 RX ADMIN — FENTANYL CITRATE PRN MCG: 50 INJECTION INTRAMUSCULAR; INTRAVENOUS at 10:01

## 2020-05-22 RX ADMIN — FENTANYL CITRATE PRN MCG: 50 INJECTION INTRAMUSCULAR; INTRAVENOUS at 14:24

## 2020-05-22 RX ADMIN — INSULIN LISPRO SCH UNITS: 100 INJECTION, SOLUTION INTRAVENOUS; SUBCUTANEOUS at 00:00

## 2020-05-22 NOTE — PDOC
SURGICAL PROGRESS NOTE


Subjective


Pt sitting up in chair, nods yes to doing OK


Vital Signs





Vital Signs








  Date Time  Temp Pulse Resp B/P (MAP) Pulse Ox O2 Delivery O2 Flow Rate FiO2


 


5/22/20 10:41   28  98 Tracheal Collar 8.0 


 


5/22/20 10:00  101  146/66 (92)    


 


5/22/20 08:00 99.2       





 99.2       








I&O











Intake and Output 


 


 5/22/20





 07:00


 


Intake Total 1344.10 ml


 


Output Total 3750 ml


 


Balance -2405.90 ml


 


 


 


Intake Oral 0 ml


 


IV Total 1344.10 ml


 


Output Urine Total 2845 ml


 


Gastric Drainage Total 400 ml


 


Drainage Total 505 ml








General:  Alert, Cooperative, No acute distress


Abdomen:  Soft, No tenderness


Labs





Laboratory Tests








Test


 5/20/20


12:31 5/20/20


16:28 5/21/20


00:44 5/21/20


06:10


 


Glucose (Fingerstick)


 258 mg/dL


(70-99) 141 mg/dL


(70-99) 165 mg/dL


(70-99) 





 


White Blood Count


 


 


 


 8.3 x10^3/uL


(4.0-11.0)


 


Red Blood Count


 


 


 


 2.72 x10^6/uL


(3.50-5.40)


 


Hemoglobin


 


 


 


 7.8 g/dL


(12.0-15.5)


 


Hematocrit


 


 


 


 23.8 %


(36.0-47.0)


 


Mean Corpuscular Volume    87 fL () 


 


Mean Corpuscular Hemoglobin    29 pg (25-35) 


 


Mean Corpuscular Hemoglobin


Concent 


 


 


 33 g/dL


(31-37)


 


Red Cell Distribution Width


 


 


 


 18.2 %


(11.5-14.5)


 


Platelet Count


 


 


 


 380 x10^3/uL


(140-400)


 


Sodium Level


 


 


 


 139 mmol/L


(136-145)


 


Potassium Level


 


 


 


 4.0 mmol/L


(3.5-5.1)


 


Chloride Level


 


 


 


 100 mmol/L


()


 


Carbon Dioxide Level


 


 


 


 39 mmol/L


(21-32)


 


Anion Gap    0 (6-14) 


 


Blood Urea Nitrogen


 


 


 


 24 mg/dL


(7-20)


 


Creatinine


 


 


 


 0.7 mg/dL


(0.6-1.0)


 


Estimated GFR


(Cockcroft-Gault) 


 


 


 88.9 





 


Glucose Level


 


 


 


 118 mg/dL


(70-99)


 


Calcium Level


 


 


 


 8.6 mg/dL


(8.5-10.1)


 


Phosphorus Level


 


 


 


 3.0 mg/dL


(2.6-4.7)


 


Magnesium Level


 


 


 


 1.9 mg/dL


(1.8-2.4)


 


Test


 5/21/20


06:22 5/21/20


12:06 5/22/20


00:39 5/22/20


06:04


 


Glucose (Fingerstick)


 122 mg/dL


(70-99) 192 mg/dL


(70-99) 149 mg/dL


(70-99) 164 mg/dL


(70-99)


 


Test


 5/22/20


08:40 5/22/20


11:25 


 





 


White Blood Count


 8.8 x10^3/uL


(4.0-11.0) 


 


 





 


Red Blood Count


 2.77 x10^6/uL


(3.50-5.40) 


 


 





 


Hemoglobin


 7.9 g/dL


(12.0-15.5) 


 


 





 


Hematocrit


 24.4 %


(36.0-47.0) 


 


 





 


Mean Corpuscular Volume 88 fL ()    


 


Mean Corpuscular Hemoglobin 29 pg (25-35)    


 


Mean Corpuscular Hemoglobin


Concent 33 g/dL


(31-37) 


 


 





 


Red Cell Distribution Width


 18.7 %


(11.5-14.5) 


 


 





 


Platelet Count


 348 x10^3/uL


(140-400) 


 


 





 


Sodium Level


 140 mmol/L


(136-145) 


 


 





 


Potassium Level


 4.3 mmol/L


(3.5-5.1) 


 


 





 


Chloride Level


 99 mmol/L


() 


 


 





 


Carbon Dioxide Level


 36 mmol/L


(21-32) 


 


 





 


Anion Gap 5 (6-14)    


 


Blood Urea Nitrogen


 22 mg/dL


(7-20) 


 


 





 


Creatinine


 0.6 mg/dL


(0.6-1.0) 


 


 





 


Estimated GFR


(Cockcroft-Gault) 106.3 


 


 


 





 


Glucose Level


 126 mg/dL


(70-99) 


 


 





 


Calcium Level


 9.3 mg/dL


(8.5-10.1) 


 


 





 


Phosphorus Level


 3.7 mg/dL


(2.6-4.7) 


 


 





 


Magnesium Level


 2.1 mg/dL


(1.8-2.4) 


 


 





 


Glucose (Fingerstick)


 


 157 mg/dL


(70-99) 


 











Laboratory Tests








Test


 5/21/20


12:06 5/22/20


00:39 5/22/20


06:04 5/22/20


08:40


 


Glucose (Fingerstick)


 192 mg/dL


(70-99) 149 mg/dL


(70-99) 164 mg/dL


(70-99) 





 


White Blood Count


 


 


 


 8.8 x10^3/uL


(4.0-11.0)


 


Red Blood Count


 


 


 


 2.77 x10^6/uL


(3.50-5.40)


 


Hemoglobin


 


 


 


 7.9 g/dL


(12.0-15.5)


 


Hematocrit


 


 


 


 24.4 %


(36.0-47.0)


 


Mean Corpuscular Volume    88 fL () 


 


Mean Corpuscular Hemoglobin    29 pg (25-35) 


 


Mean Corpuscular Hemoglobin


Concent 


 


 


 33 g/dL


(31-37)


 


Red Cell Distribution Width


 


 


 


 18.7 %


(11.5-14.5)


 


Platelet Count


 


 


 


 348 x10^3/uL


(140-400)


 


Sodium Level


 


 


 


 140 mmol/L


(136-145)


 


Potassium Level


 


 


 


 4.3 mmol/L


(3.5-5.1)


 


Chloride Level


 


 


 


 99 mmol/L


()


 


Carbon Dioxide Level


 


 


 


 36 mmol/L


(21-32)


 


Anion Gap    5 (6-14) 


 


Blood Urea Nitrogen


 


 


 


 22 mg/dL


(7-20)


 


Creatinine


 


 


 


 0.6 mg/dL


(0.6-1.0)


 


Estimated GFR


(Cockcroft-Gault) 


 


 


 106.3 





 


Glucose Level


 


 


 


 126 mg/dL


(70-99)


 


Calcium Level


 


 


 


 9.3 mg/dL


(8.5-10.1)


 


Phosphorus Level


 


 


 


 3.7 mg/dL


(2.6-4.7)


 


Magnesium Level


 


 


 


 2.1 mg/dL


(1.8-2.4)


 


Test


 5/22/20


11:25 


 


 





 


Glucose (Fingerstick)


 157 mg/dL


(70-99) 


 


 











Problem List


Problems


Medical Problems:


(1) Acute pancreatitis


Status: Acute  





(2) Cholelithiasis


Status: Acute  








Assessment/Plan


s/p lap exploration


cont drains and supportive care.











GAMAL ZHOU MD             May 22, 2020 11:30

## 2020-05-22 NOTE — NUR
Patient was awake at 0100 and requested her bath be done. I bathed her and changed all the 
dressings around her drains. The left drain site is red and had a small amount of purulent 
drainage. Patient has worn her PM valve for at least 3 1/2 hours tonight. Has helped with 
her cares and is learning to cough up the sputum she had been requesting us to suction out. 
Had a much better night after her bath.

## 2020-05-22 NOTE — PDOC
PROGRESS NOTES


Chief Complaint


Chief Complaint


Acute hypoxic Respiratory failure requiring mechanical ventilation (now 

extubated for several days but still with tracheostomy)


Tracheostomy


bilateral pleural effusions/pulm edema 


Sepsis


Severe Acute gallstone pancreatitis (not a surgical candidate at this time) with

necrosis


Acute kidney failure now requiring dialysis


Salpingitis


Gallstones (Calculus of gallbladder with acute cholecystitis without 

obstruction)


HTN 


Leukocytosis 


Hypoxia


Uterine fibroid


Intractable pain


Intractable nausea


Covid 19 negative. 


Acute on chronic anemia 


EEG: No seizure activityFever  - better currently - intermittent could be from 

underlying pancreatitis blood cults 5/4 - neg so far


? Ileus with vomiting


Abd distention - U/S and CT reviewed s/p 0.4 L of opaque, debris-containing 

ascites was removed 5/6


Acute pancreatitis with persistent necrosis


- 4/27 status post ROBERT drain placement + C paropsilosis. s/p additional drains 

5/8


Anemia - S/p PRBCs


Cholelithiasis with thickening of the gallbladder wall.


Leucocytosis improving


JED, hyperkalemia, Metabolic acidosis off dialysis


Acute hypoxic resp failure ,bilateral pleural effusion and atelectasis


hypocalcemia 


Prediabetes


HTN


s/p trach


ESRD on HD


Hyperglycemia





History of Present Illness


History of Present Illness


5/22,.   she doesnt want to wear her passy-kristan valve,  discussed str and plan 

with her.


speech following,  needs swallow study,   but needs to wear her valve longer,  


cont current


able to walk some, walker 





5/21  stronger,   better,   


we discussed better oral care


she would like to try swallow study,  wants to try to eat,    speech is 

following








5/20/2020 


She remains in the ICU


sitting up and working with OT,   getting better


if limit pain meds, may do better off the vent, 





Nurses trying to suction her,  that is also improved, 


Chart reviewed


 








5/19/2020


Patient seen and examined in the ICU


She had an episode yesterday of tachycardia and severe agitation we gave her 

some Ativan


After that she seemed to have stroke symptoms but now that the Ativan has wore 

off her stroke symptoms have resolved


 


 


She is on IV meropenem and daptomycin and micafungin


Chart reviewed


Discussed with RN


Patient is still critically ill


 


BRIEF OPERATIVE NOTE


Pre-Op Diagnosis


Pancreatitis with pseudocysts, suspected infection


Post-Op Diagnosis


same


Procedure Performed


CT abdominal Drains x 3


Surgeon


Hardy


Anesthesia Type:  Conscious Sedation


Findings


3 abdominal drains, 14F, with turbid pancreatic fluid and necrotic debris in 

each.


Complications


No immediate








5/9: Patient today somewhat restless and having bilious secretions from ET tube,

imaging studies ordered, discussed with consultant. Pretty poor prognosis, 

hopefully is not a fistula, poor surgical candidate. 





5/10: Imaging with no acute events, she seems more stable today compared to 

yesterday. Encouraged as much activity as possible patient at high risk for 

severe depression.





Vitals


Vitals





Vital Signs








  Date Time  Temp Pulse Resp B/P (MAP) Pulse Ox O2 Delivery O2 Flow Rate FiO2


 


5/22/20 10:41   28  98 Tracheal Collar 8.0 


 


5/22/20 10:00  101  146/66 (92)    


 


5/22/20 08:00 99.2       





 99.2       











Physical Exam


Physical Exam


GENERAL: Alert, awake trying to talk but has a lot of secretions, remains 

confused


HEENT:  oral cavity dry, NGT


NECK:  Trach with speaking valve


LUNGS: no rhonchi


HEART:  S1, S2, regular 


ABDOMEN: mod Distended, hypoactive BS, tender, + drainsx1


: [place] (4/14)


EXTREMITIES: Generalized edema, improving, no cyanosis, SCDs bilaterally 


SKIN: Warm and dry.  No generalized rash.  


CNS: Very weak 


PICC(4/30) clean


General:  No acute distress


Heart:  Regular rate, Normal S1, Normal S2, No murmurs, Gallops


Lungs:  Wheezing, Other (decrease bs)


Abdomen:  Soft, Other (drains in place)


Extremities:  No clubbing, No cyanosis, No edema, Normal pulses, No 

tenderness/swelling


Skin:  Other (warm, dry)





Labs


LABS





Laboratory Tests








Test


 5/21/20


12:06 5/22/20


00:39 5/22/20


06:04 5/22/20


08:40


 


Glucose (Fingerstick)


 192 mg/dL


(70-99) 149 mg/dL


(70-99) 164 mg/dL


(70-99) 





 


White Blood Count


 


 


 


 8.8 x10^3/uL


(4.0-11.0)


 


Red Blood Count


 


 


 


 2.77 x10^6/uL


(3.50-5.40)


 


Hemoglobin


 


 


 


 7.9 g/dL


(12.0-15.5)


 


Hematocrit


 


 


 


 24.4 %


(36.0-47.0)


 


Mean Corpuscular Volume    88 fL () 


 


Mean Corpuscular Hemoglobin    29 pg (25-35) 


 


Mean Corpuscular Hemoglobin


Concent 


 


 


 33 g/dL


(31-37)


 


Red Cell Distribution Width


 


 


 


 18.7 %


(11.5-14.5)


 


Platelet Count


 


 


 


 348 x10^3/uL


(140-400)


 


Sodium Level


 


 


 


 140 mmol/L


(136-145)


 


Potassium Level


 


 


 


 4.3 mmol/L


(3.5-5.1)


 


Chloride Level


 


 


 


 99 mmol/L


()


 


Carbon Dioxide Level


 


 


 


 36 mmol/L


(21-32)


 


Anion Gap    5 (6-14) 


 


Blood Urea Nitrogen


 


 


 


 22 mg/dL


(7-20)


 


Creatinine


 


 


 


 0.6 mg/dL


(0.6-1.0)


 


Estimated GFR


(Cockcroft-Gault) 


 


 


 106.3 





 


Glucose Level


 


 


 


 126 mg/dL


(70-99)


 


Calcium Level


 


 


 


 9.3 mg/dL


(8.5-10.1)


 


Phosphorus Level


 


 


 


 3.7 mg/dL


(2.6-4.7)


 


Magnesium Level


 


 


 


 2.1 mg/dL


(1.8-2.4)











Assessment and Plan


Assessmemt and Plan


Problems


Medical Problems:


(1) Acute pancreatitis


Status: Acute  





(2) Cholelithiasis


Status: Acute  











Comment


Review of Relevant


I have reviewed the following items josy (where applicable) has been applied.


Labs





Laboratory Tests








Test


 5/20/20


12:31 5/20/20


16:28 5/21/20


00:44 5/21/20


06:10


 


Glucose (Fingerstick)


 258 mg/dL


(70-99) 141 mg/dL


(70-99) 165 mg/dL


(70-99) 





 


White Blood Count


 


 


 


 8.3 x10^3/uL


(4.0-11.0)


 


Red Blood Count


 


 


 


 2.72 x10^6/uL


(3.50-5.40)


 


Hemoglobin


 


 


 


 7.8 g/dL


(12.0-15.5)


 


Hematocrit


 


 


 


 23.8 %


(36.0-47.0)


 


Mean Corpuscular Volume    87 fL () 


 


Mean Corpuscular Hemoglobin    29 pg (25-35) 


 


Mean Corpuscular Hemoglobin


Concent 


 


 


 33 g/dL


(31-37)


 


Red Cell Distribution Width


 


 


 


 18.2 %


(11.5-14.5)


 


Platelet Count


 


 


 


 380 x10^3/uL


(140-400)


 


Sodium Level


 


 


 


 139 mmol/L


(136-145)


 


Potassium Level


 


 


 


 4.0 mmol/L


(3.5-5.1)


 


Chloride Level


 


 


 


 100 mmol/L


()


 


Carbon Dioxide Level


 


 


 


 39 mmol/L


(21-32)


 


Anion Gap    0 (6-14) 


 


Blood Urea Nitrogen


 


 


 


 24 mg/dL


(7-20)


 


Creatinine


 


 


 


 0.7 mg/dL


(0.6-1.0)


 


Estimated GFR


(Cockcroft-Gault) 


 


 


 88.9 





 


Glucose Level


 


 


 


 118 mg/dL


(70-99)


 


Calcium Level


 


 


 


 8.6 mg/dL


(8.5-10.1)


 


Phosphorus Level


 


 


 


 3.0 mg/dL


(2.6-4.7)


 


Magnesium Level


 


 


 


 1.9 mg/dL


(1.8-2.4)


 


Test


 5/21/20


06:22 5/21/20


12:06 5/22/20


00:39 5/22/20


06:04


 


Glucose (Fingerstick)


 122 mg/dL


(70-99) 192 mg/dL


(70-99) 149 mg/dL


(70-99) 164 mg/dL


(70-99)


 


Test


 5/22/20


08:40 


 


 





 


White Blood Count


 8.8 x10^3/uL


(4.0-11.0) 


 


 





 


Red Blood Count


 2.77 x10^6/uL


(3.50-5.40) 


 


 





 


Hemoglobin


 7.9 g/dL


(12.0-15.5) 


 


 





 


Hematocrit


 24.4 %


(36.0-47.0) 


 


 





 


Mean Corpuscular Volume 88 fL ()    


 


Mean Corpuscular Hemoglobin 29 pg (25-35)    


 


Mean Corpuscular Hemoglobin


Concent 33 g/dL


(31-37) 


 


 





 


Red Cell Distribution Width


 18.7 %


(11.5-14.5) 


 


 





 


Platelet Count


 348 x10^3/uL


(140-400) 


 


 





 


Sodium Level


 140 mmol/L


(136-145) 


 


 





 


Potassium Level


 4.3 mmol/L


(3.5-5.1) 


 


 





 


Chloride Level


 99 mmol/L


() 


 


 





 


Carbon Dioxide Level


 36 mmol/L


(21-32) 


 


 





 


Anion Gap 5 (6-14)    


 


Blood Urea Nitrogen


 22 mg/dL


(7-20) 


 


 





 


Creatinine


 0.6 mg/dL


(0.6-1.0) 


 


 





 


Estimated GFR


(Cockcroft-Gault) 106.3 


 


 


 





 


Glucose Level


 126 mg/dL


(70-99) 


 


 





 


Calcium Level


 9.3 mg/dL


(8.5-10.1) 


 


 





 


Phosphorus Level


 3.7 mg/dL


(2.6-4.7) 


 


 





 


Magnesium Level


 2.1 mg/dL


(1.8-2.4) 


 


 











Laboratory Tests








Test


 5/21/20


12:06 5/22/20


00:39 5/22/20


06:04 5/22/20


08:40


 


Glucose (Fingerstick)


 192 mg/dL


(70-99) 149 mg/dL


(70-99) 164 mg/dL


(70-99) 





 


White Blood Count


 


 


 


 8.8 x10^3/uL


(4.0-11.0)


 


Red Blood Count


 


 


 


 2.77 x10^6/uL


(3.50-5.40)


 


Hemoglobin


 


 


 


 7.9 g/dL


(12.0-15.5)


 


Hematocrit


 


 


 


 24.4 %


(36.0-47.0)


 


Mean Corpuscular Volume    88 fL () 


 


Mean Corpuscular Hemoglobin    29 pg (25-35) 


 


Mean Corpuscular Hemoglobin


Concent 


 


 


 33 g/dL


(31-37)


 


Red Cell Distribution Width


 


 


 


 18.7 %


(11.5-14.5)


 


Platelet Count


 


 


 


 348 x10^3/uL


(140-400)


 


Sodium Level


 


 


 


 140 mmol/L


(136-145)


 


Potassium Level


 


 


 


 4.3 mmol/L


(3.5-5.1)


 


Chloride Level


 


 


 


 99 mmol/L


()


 


Carbon Dioxide Level


 


 


 


 36 mmol/L


(21-32)


 


Anion Gap    5 (6-14) 


 


Blood Urea Nitrogen


 


 


 


 22 mg/dL


(7-20)


 


Creatinine


 


 


 


 0.6 mg/dL


(0.6-1.0)


 


Estimated GFR


(Cockcroft-Gault) 


 


 


 106.3 





 


Glucose Level


 


 


 


 126 mg/dL


(70-99)


 


Calcium Level


 


 


 


 9.3 mg/dL


(8.5-10.1)


 


Phosphorus Level


 


 


 


 3.7 mg/dL


(2.6-4.7)


 


Magnesium Level


 


 


 


 2.1 mg/dL


(1.8-2.4)








Microbiology


5/17/20 Blood Culture - Final, Complete


          NO GROWTH AFTER 5 DAYS


5/6/20 Fungal Culture - Final, Complete


         


5/6/20 Fungal Culture Result 1 - Final, Complete


         


4/30/20 Aerobic Culture - Final, Complete


          


4/30/20 Aerobic Culture Result 1 (ANSON) - Final, Complete


          


4/30/20 Gram Stain - Final, Complete


          


4/30/20 Gram Stain Result 1 ([facility]SON) - Final, Complete


          


4/30/20 Gram Stain Result 2 ([facility]SON) - Final, Complete


          


4/12/20 Urine Culture - Final, Complete


          


4/12/20 Urine Culture Result 1 (ANSON) - Final, Complete


Medications





Current Medications


Sodium Chloride 1,000 ml @  1,000 mls/hr Q1H IV  Last administered on 3/16/20at 

03:00;  Start 3/16/20 at 03:00;  Stop 3/16/20 at 03:59;  Status DC


Ondansetron HCl (Zofran) 4 mg 1X  ONCE IVP  Last administered on 3/16/20at 

03:27;  Start 3/16/20 at 03:00;  Stop 3/16/20 at 03:01;  Status DC


Morphine Sulfate (Morphine Sulfate) 4 mg 1X  ONCE IV ;  Start 3/16/20 at 03:00; 

Stop 3/16/20 at 03:01;  Status Cancel


Ketorolac Tromethamine (Toradol 30mg Vial) 30 mg 1X  ONCE IV  Last administered 

on 3/16/20at 02:54;  Start 3/16/20 at 03:00;  Stop 3/16/20 at 03:01;  Status DC


Fentanyl Citrate (Fentanyl 2ml Vial) 25 mcg 1X  ONCE IVP  Last administered on 

3/16/20at 03:23;  Start 3/16/20 at 03:30;  Stop 3/16/20 at 03:31;  Status DC


Fentanyl Citrate (Fentanyl 2ml Vial) 100 mcg STK-MED ONCE .ROUTE ;  Start 

3/16/20 at 03:18;  Stop 3/16/20 at 03:18;  Status DC


Iohexol (Omnipaque 350 Mg/ml) 90 ml 1X  ONCE IV  Last administered on 3/16/20at 

03:25;  Start 3/16/20 at 03:30;  Stop 3/16/20 at 03:31;  Status DC


Info (CONTRAST GIVEN -- Rx MONITORING) 1 each PRN DAILY  PRN MC SEE COMMENTS;  

Start 3/16/20 at 03:30;  Stop 3/18/20 at 03:29;  Status DC


Hydromorphone HCl (Dilaudid) 0.5 mg 1X  ONCE IV  Last administered on 3/16/20at 

03:55;  Start 3/16/20 at 04:30;  Stop 3/16/20 at 04:32;  Status DC


Ondansetron HCl (Zofran) 4 mg PRN Q8HRS  PRN IV NAUSEA/VOMITING 1ST CHOICE;  

Start 3/16/20 at 05:00;  Stop 3/16/20 at 09:27;  Status DC


Morphine Sulfate (Morphine Sulfate) 2 mg PRN Q2HR  PRN IV SEVERE PAIN 7-10 Last 

administered on 3/17/20at 12:26;  Start 3/16/20 at 05:00;  Stop 3/17/20 at 

14:15;  Status DC


Sodium Chloride 1,000 ml @  125 mls/hr Q8H IV  Last administered on 3/16/20at 

20:56;  Start 3/16/20 at 05:00;  Stop 3/17/20 at 04:59;  Status DC


Hydromorphone HCl (Dilaudid) 0.5 mg PRN Q3HRS  PRN IV SEVERE PAIN 7-10 Last 

administered on 3/17/20at 10:06;  Start 3/16/20 at 05:00;  Stop 3/17/20 at 

12:01;  Status DC


Piperacillin Sod/ Tazobactam Sod 4.5 gm/Sodium Chloride 100 ml @  200 mls/hr 1X 

ONCE IV  Last administered on 3/16/20at 05:44;  Start 3/16/20 at 06:00;  Stop 

3/16/20 at 06:29;  Status DC


Ondansetron HCl (Zofran) 4 mg PRN Q4HRS  PRN IV NAUSEA/VOMITING 1ST CHOICE Last 

administered on 5/19/20at 12:40;  Start 3/16/20 at 09:30


Insulin Human Lispro (HumaLOG) 0-9 UNITS Q6HRS SQ  Last administered on 

5/22/20at 06:06;  Start 3/16/20 at 09:30


Dextrose (Dextrose 50%-Water Syringe) 12.5 gm PRN Q15MIN  PRN IV SEE COMMENTS;  

Start 3/16/20 at 09:30


Pantoprazole Sodium (PROTONIX VIAL for IV PUSH) 40 mg DAILYAC IVP  Last 

administered on 5/22/20at 08:32;  Start 3/16/20 at 11:30


Prochlorperazine Edisylate (Compazine) 10 mg PRN Q6HRS  PRN IV NAUSEA/VOMITING, 

2nd CHOICE Last administered on 5/14/20at 06:11;  Start 3/16/20 at 17:45


Atenolol (Tenormin) 100 mg DAILY PO ;  Start 3/17/20 at 09:00;  Stop 3/16/20 at 

20:08;  Status DC


Metoprolol Tartrate (Lopressor Vial) 2.5 mg Q6HRS IVP  Last administered on 

3/17/20at 05:51;  Start 3/16/20 at 20:15;  Stop 3/17/20 at 10:02;  Status DC


Metoprolol Tartrate (Lopressor Vial) 5 mg Q6HRS IVP  Last administered on 

3/26/20at 00:12;  Start 3/17/20 at 10:15;  Stop 3/28/20 at 08:48;  Status DC


Hydromorphone HCl (Dilaudid) 1 mg PRN Q3HRS  PRN IV SEVERE PAIN 7-10 Last 

administered on 3/23/20at 05:13;  Start 3/17/20 at 12:00;  Stop 3/31/20 at 

00:25;  Status DC


Lidocaine HCl (Buffered Lidocaine 1%) 3 ml STK-MED ONCE .ROUTE ;  Start 3/17/20 

at 12:55;  Stop 3/17/20 at 12:56;  Status DC


Albumin Human 500 ml @  125 mls/hr 1X  ONCE IV  Last administered on 3/17/20at 

14:33;  Start 3/17/20 at 14:30;  Stop 3/17/20 at 18:32;  Status DC


Norepinephrine Bitartrate 8 mg/ Dextrose 258 ml @  17.299 mls/ hr CONT  PRN IV 

PER PROTOCOL Last administered on 4/14/20at 12:48;  Start 3/17/20 at 15:30;  

Stop 4/17/20 at 09:19;  Status DC


Sodium Chloride 1,000 ml @  125 mls/hr Q8H IV  Last administered on 3/17/20at 

21:04;  Start 3/17/20 at 16:00;  Stop 3/18/20 at 02:42;  Status DC


Albumin Human 500 ml @  125 mls/hr PRN BID  PRN IV After every 2L NSS & BP < 

90mm Last administered on 4/1/20at 14:21;  Start 3/17/20 at 16:00


Iohexol (Omnipaque 300 Mg/ml) 60 ml 1X  ONCE IV  Last administered on 3/17/20at 

17:20;  Start 3/17/20 at 17:00;  Stop 3/17/20 at 17:01;  Status DC


Info (CONTRAST GIVEN -- Rx MONITORING) 1 each PRN DAILY  PRN MC SEE COMMENTS;  

Start 3/17/20 at 17:00;  Stop 3/19/20 at 16:59;  Status DC


Meropenem 1 gm/ Sodium Chloride 100 ml @  200 mls/hr Q8HRS IV  Last administered

on 3/18/20at 05:45;  Start 3/17/20 at 20:00;  Stop 3/18/20 at 08:48;  Status DC


Furosemide (Lasix) 40 mg 1X  ONCE IVP  Last administered on 3/17/20at 22:12;  

Start 3/17/20 at 22:30;  Stop 3/17/20 at 22:31;  Status DC


Calcium Chloride 1000 mg/Sodium Chloride 110 ml @  220 mls/hr 1X  ONCE IV  Last 

administered on 3/17/20at 22:11;  Start 3/17/20 at 22:30;  Stop 3/17/20 at 

22:59;  Status DC


Albuterol Sulfate (Ventolin Neb Soln) 2.5 mg 1X  ONCE NEB  Last administered on 

3/18/20at 00:56;  Start 3/17/20 at 22:30;  Stop 3/17/20 at 22:31;  Status DC


Insulin Human Regular (HumuLIN R VIAL) 5 unit 1X  ONCE IV  Last administered on 

3/17/20at 22:14;  Start 3/17/20 at 22:30;  Stop 3/17/20 at 22:31;  Status DC


Magnesium Sulfate 50 ml @ 25 mls/hr 1X  ONCE IV  Last administered on 3/18/20at 

02:57;  Start 3/18/20 at 03:00;  Stop 3/18/20 at 04:59;  Status DC


Calcium Gluconate 1000 mg/Sodium Chloride 110 ml @  220 mls/hr 1X  ONCE IV  Last

administered on 3/18/20at 02:46;  Start 3/18/20 at 03:00;  Stop 3/18/20 at 

03:29;  Status DC


Sodium Chloride 1,000 ml @  200 mls/hr Q5H IV  Last administered on 3/18/20at 

02:46;  Start 3/18/20 at 03:00;  Stop 3/18/20 at 10:21;  Status DC


Calcium Gluconate 1000 mg/Sodium Chloride 110 ml @  220 mls/hr 1X  ONCE IV  Last

administered on 3/18/20at 03:21;  Start 3/18/20 at 03:30;  Stop 3/18/20 at 

03:59;  Status DC


Sodium Bicarbonate 50 meq/Sodium Chloride 1,050 ml @  75 mls/hr Q14H IV  Last 

administered on 3/22/20at 21:10;  Start 3/18/20 at 07:30;  Stop 3/23/20 at 

10:28;  Status DC


Calcium Gluconate 2000 mg/Sodium Chloride 120 ml @  220 mls/hr 1X  ONCE IV  Last

administered on 3/18/20at 09:05;  Start 3/18/20 at 07:30;  Stop 3/18/20 at 

08:02;  Status DC


Lidocaine HCl (Xylocaine-Mpf 1% 2ml Vial) 2 ml STK-MED ONCE .ROUTE ;  Start 

3/18/20 at 08:47;  Stop 3/18/20 at 08:47;  Status DC


Meropenem 500 mg/ Sodium Chloride 50 ml @  100 mls/hr Q12HR IV  Last 

administered on 3/23/20at 21:01;  Start 3/18/20 at 18:00;  Stop 3/24/20 at 

07:58;  Status DC


Lidocaine HCl (Buffered Lidocaine 1%) 3 ml STK-MED ONCE .ROUTE ;  Start 3/18/20 

at 09:46;  Stop 3/18/20 at 09:46;  Status DC


Lidocaine HCl (Buffered Lidocaine 1%) 6 ml 1X  ONCE INJ  Last administered on 

3/18/20at 10:26;  Start 3/18/20 at 10:15;  Stop 3/18/20 at 10:16;  Status DC


Info (Tpn Per Pharmacy) 1 each PRN DAILY  PRN MC SEE COMMENTS Last administered 

on 5/21/20at 11:11;  Start 3/18/20 at 12:00


Sodium Chloride 1,000 ml @  1,000 mls/hr Q1H PRN IV hypotension;  Start 3/18/20 

at 12:07;  Stop 3/18/20 at 18:06;  Status DC


Diphenhydramine HCl (Benadryl) 25 mg 1X PRN  PRN IV ITCHING;  Start 3/18/20 at 

12:15;  Stop 3/19/20 at 12:14;  Status DC


Diphenhydramine HCl (Benadryl) 25 mg 1X PRN  PRN IV ITCHING;  Start 3/18/20 at 

12:15;  Stop 3/19/20 at 12:14;  Status DC


Sodium Chloride 1,000 ml @  400 mls/hr Q2H30M PRN IV PATENCY;  Start 3/18/20 at 

12:07;  Stop 3/19/20 at 00:06;  Status DC


Info (PHARMACY MONITORING -- do not chart) 1 each PRN DAILY  PRN MC SEE CO

MMENTS;  Start 3/18/20 at 12:15;  Stop 3/20/20 at 08:13;  Status DC


Sodium Chloride 90 meq/Calcium Gluconate 10 meq/ Multivitamins 10 ml/Chromium/ 

Copper/Manganese/ Seleni/Zn 1 ml/ Total Parenteral Nutrition/Amino 

Acids/Dextrose/ Fat Emulsion Intravenous 55.005 ml  @ 2.292 mls/hr TPN  CONT IV 

;  Start 3/18/20 at 22:00;  Stop 3/18/20 at 12:33;  Status DC


Info (Tpn Per Pharmacy) 1 each PRN DAILY  PRN MC SEE COMMENTS;  Start 3/18/20 at

12:30;  Status UNV


Sodium Chloride 90 meq/Calcium Gluconate 10 meq/ Multivitamins 10 ml/Chromium/ 

Copper/Manganese/ Seleni/Zn 0.5 ml/ Total Parenteral Nutrition/Amino 

Acids/Dextrose/ Fat Emulsion Intravenous 1,512 ml @  63 mls/hr TPN  CONT IV  

Last administered on 3/18/20at 22:06;  Start 3/18/20 at 22:00;  Stop 3/19/20 at 

21:59;  Status DC


Calcium Carbonate/ Glycine (Tums) 500 mg PRN AFTMEALHC  PRN PO INDIGESTION;  

Start 3/18/20 at 17:45;  Stop 5/13/20 at 10:25;  Status DC


Calcium Gluconate (Calcium Gluconate) 2,000 mg 1X  ONCE IVP  Last administered 

on 3/19/20at 02:19;  Start 3/19/20 at 02:15;  Stop 3/19/20 at 02:16;  Status DC


Calcium Chloride 3000 mg/Sodium Chloride 1,030 ml @  50 mls/hr [id number] IV  Last 

administered on 3/21/20at 02:17;  Start 3/19/20 at 08:00;  Stop 3/21/20 at 

15:23;  Status DC


Lorazepam (Ativan Inj) 1 mg PRN Q4HRS  PRN IVP ANXIETY / AGITATION, 2nd choic 

Last administered on 4/17/20at 03:51;  Start 3/19/20 at 09:00;  Stop 4/17/20 at 

09:19;  Status DC


Sodium Chloride 1,000 ml @  1,000 mls/hr Q1H PRN IV hypotension;  Start 3/19/20 

at 08:56;  Stop 3/19/20 at 14:55;  Status DC


Albumin Human 200 ml @  200 mls/hr 1X PRN  PRN IV Hypotension;  Start 3/19/20 at

09:00;  Stop 3/19/20 at 14:59;  Status DC


Diphenhydramine HCl (Benadryl) 25 mg 1X PRN  PRN IV ITCHING;  Start 3/19/20 at 

09:00;  Stop 3/20/20 at 08:59;  Status DC


Diphenhydramine HCl (Benadryl) 25 mg 1X PRN  PRN IV ITCHING;  Start 3/19/20 at 

09:00;  Stop 3/20/20 at 08:59;  Status DC


Sodium Chloride 1,000 ml @  400 mls/hr Q2H30M PRN IV PATENCY;  Start 3/19/20 at 

08:56;  Stop 3/19/20 at 20:55;  Status DC


Info (PHARMACY MONITORING -- do not chart) 1 each PRN DAILY  PRN MC SEE 

COMMENTS;  Start 3/19/20 at 09:00;  Status UNV


Info (PHARMACY MONITORING -- do not chart) 1 each PRN DAILY  PRN MC SEE 

COMMENTS;  Start 3/19/20 at 09:00;  Stop 3/20/20 at 08:13;  Status DC


Digoxin (Lanoxin) 500 mcg 1X  ONCE IV  Last administered on 3/19/20at 10:04;  

Start 3/19/20 at 10:00;  Stop 3/19/20 at 10:01;  Status DC


Digoxin (Lanoxin) 125 mcg 1X  ONCE IV  Last administered on 3/19/20at 17:10;  

Start 3/19/20 at 18:00;  Stop 3/19/20 at 18:01;  Status DC


Magnesium Sulfate 100 ml @  25 mls/hr 1X  ONCE IV  Last administered on 

3/19/20at 12:48;  Start 3/19/20 at 13:00;  Stop 3/19/20 at 16:59;  Status DC


Sodium Chloride 90 meq/Magnesium Sulfate 10 meq/ Calcium Gluconate 20 meq/ 

Multivitamins 10 ml/Chromium/ Copper/Manganese/ Seleni/Zn 0.5 ml/ Total 

Parenteral Nutrition/Amino Acids/Dextrose/ Fat Emulsion Intravenous 1,512 ml @  

63 mls/hr TPN  CONT IV  Last administered on 3/19/20at 22:25;  Start 3/19/20 at 

22:00;  Stop 3/20/20 at 21:59;  Status DC


Sodium Chloride 1,000 ml @  1,000 mls/hr Q1H PRN IV hypotension;  Start 3/20/20 

at 08:05;  Stop 3/20/20 at 14:04;  Status DC


Albumin Human 200 ml @  200 mls/hr 1X  ONCE IV  Last administered on 3/20/20at 

08:57;  Start 3/20/20 at 08:15;  Stop 3/20/20 at 09:14;  Status DC


Diphenhydramine HCl (Benadryl) 25 mg 1X PRN  PRN IV ITCHING;  Start 3/20/20 at 

08:15;  Stop 3/21/20 at 08:14;  Status DC


Diphenhydramine HCl (Benadryl) 25 mg 1X PRN  PRN IV ITCHING;  Start 3/20/20 at 

08:15;  Stop 3/21/20 at 08:14;  Status DC


Sodium Chloride 1,000 ml @  400 mls/hr Q2H30M PRN IV PATENCY;  Start 3/20/20 at 

08:05;  Stop 3/20/20 at 20:04;  Status DC


Info (PHARMACY MONITORING -- do not chart) 1 each PRN DAILY  PRN MC SEE 

COMMENTS;  Start 3/20/20 at 08:15;  Stop 3/24/20 at 07:57;  Status DC


Sodium Chloride 90 meq/Potassium Chloride 15 meq/ Potassium Phosphate 10 mmol/ 

Magnesium Sulfate 10 meq/Calcium Gluconate 20 meq/ Multivitamins 10 ml/Chromium/

Copper/Manganese/ Seleni/Zn 0.5 ml/ Total Parenteral Nutrition/Amino 

Acids/Dextrose/ Fat Emulsion Intravenous 1,512 ml @  63 mls/hr TPN  CONT IV  

Last administered on 3/20/20at 21:01;  Start 3/20/20 at 22:00;  Stop 3/21/20 at 

21:59;  Status DC


Potassium Chloride/Water 100 ml @  100 mls/hr 1X  ONCE IV  Last administered on 

3/20/20at 14:09;  Start 3/20/20 at 14:00;  Stop 3/20/20 at 14:59;  Status DC


Benzocaine (Hurricaine One) 1 spray 1X  ONCE MM  Last administered on 3/20/20at 

16:38;  Start 3/20/20 at 14:30;  Stop 3/20/20 at 14:31;  Status DC


Lidocaine HCl (Glydo (Lidocaine) Jelly) 1 thomas 1X  ONCE MM  Last administered on 

3/20/20at 16:38;  Start 3/20/20 at 14:30;  Stop 3/20/20 at 14:31;  Status DC


Linezolid/Dextrose 300 ml @  300 mls/hr Q12HR IV  Last administered on 3/26/20at

21:04;  Start 3/20/20 at 20:00;  Stop 3/27/20 at 07:50;  Status DC


Acetaminophen (Tylenol) 650 mg PRN Q6HRS  PRN PO MILD PAIN / TEMP;  Start 

3/21/20 at 03:30;  Stop 3/21/20 at 03:36;  Status DC


Acetaminophen (Tylenol) 650 mg PRN Q6HRS  PRN PEG MILD PAIN / TEMP Last 

administered on 4/16/20at 19:56;  Start 3/21/20 at 03:36;  Stop 5/13/20 at 

10:25;  Status DC


Sodium Chloride 1,000 ml @  1,000 mls/hr Q1H PRN IV hypotension;  Start 3/21/20 

at 07:50;  Stop 3/21/20 at 13:49;  Status DC


Albumin Human 200 ml @  200 mls/hr 1X PRN  PRN IV Hypotension;  Start 3/21/20 at

08:00;  Stop 3/21/20 at 13:59;  Status DC


Sodium Chloride (Normal Saline Flush) 10 ml 1X PRN  PRN IV AP catheter pack;  

Start 3/21/20 at 08:00;  Stop 3/22/20 at 07:59;  Status DC


Sodium Chloride (Normal Saline Flush) 10 ml 1X PRN  PRN IV  catheter pack;  

Start 3/21/20 at 08:00;  Stop 3/22/20 at 07:59;  Status DC


Sodium Chloride 1,000 ml @  400 mls/hr Q2H30M PRN IV PATENCY;  Start 3/21/20 at 

07:50;  Stop 3/21/20 at 19:49;  Status DC


Info (PHARMACY MONITORING -- do not chart) 1 each PRN DAILY  PRN MC SEE 

COMMENTS;  Start 3/21/20 at 08:00;  Status UNV


Info (PHARMACY MONITORING -- do not chart) 1 each PRN DAILY  PRN MC SEE 

COMMENTS;  Start 3/21/20 at 08:00;  Stop 3/23/20 at 08:25;  Status DC


Sodium Chloride 90 meq/Potassium Chloride 15 meq/ Potassium Phosphate 10 mmol/ 

Magnesium Sulfate 10 meq/Calcium Gluconate 20 meq/ Multivitamins 10 ml/Chromium/

Copper/Manganese/ Seleni/Zn 0.5 ml/ Total Parenteral Nutrition/Amino 

Acids/Dextrose/ Fat Emulsion Intravenous 1,512 ml @  63 mls/hr TPN  CONT IV  

Last administered on 3/21/20at 20:57;  Start 3/21/20 at 22:00;  Stop 3/22/20 at 

21:59;  Status DC


Sodium Chloride 90 meq/Potassium Chloride 15 meq/ Potassium Phosphate 15 mmol/ 

Magnesium Sulfate 10 meq/Calcium Gluconate 20 meq/ Multivitamins 10 ml/Chromium/

Copper/Manganese/ Seleni/Zn 0.5 ml/ Total Parenteral Nutrition/Amino 

Acids/Dextrose/ Fat Emulsion Intravenous 1,512 ml @  63 mls/hr TPN  CONT IV ;  

Start 3/22/20 at 22:00;  Stop 3/22/20 at 14:16;  Status DC


Sodium Chloride 90 meq/Potassium Chloride 15 meq/ Potassium Phosphate 15 mmol/ 

Magnesium Sulfate 10 meq/Calcium Gluconate 20 meq/ Multivitamins 10 ml/Chromium/

Copper/Manganese/ Seleni/Zn 0.5 ml/ Total Parenteral Nutrition/Amino 

Acids/Dextrose/ Fat Emulsion Intravenous 1,200 ml @  50 mls/hr TPN  CONT IV ;  

Start 3/22/20 at 22:00;  Stop 3/22/20 at 14:17;  Status DC


Sodium Chloride 90 meq/Potassium Chloride 15 meq/ Potassium Phosphate 10 mmol/ 

Magnesium Sulfate 10 meq/Calcium Gluconate 20 meq/ Multivitamins 10 ml/Chromium/

Copper/Manganese/ Seleni/Zn 0.5 ml/ Total Parenteral Nutrition/Amino Ac

ids/Dextrose/ Fat Emulsion Intravenous 1,200 ml @  50 mls/hr TPN  CONT IV  Last 

administered on 3/22/20at 23:29;  Start 3/22/20 at 22:00;  Stop 3/23/20 at 

21:59;  Status DC


Sodium Chloride 1,000 ml @  1,000 mls/hr Q1H PRN IV hypotension;  Start 3/23/20 

at 07:28;  Stop 3/23/20 at 13:27;  Status DC


Albumin Human 200 ml @  200 mls/hr 1X  ONCE IV  Last administered on 3/23/20at 

08:51;  Start 3/23/20 at 07:30;  Stop 3/23/20 at 08:29;  Status DC


Diphenhydramine HCl (Benadryl) 25 mg 1X PRN  PRN IV ITCHING;  Start 3/23/20 at 

07:30;  Stop 3/24/20 at 07:29;  Status DC


Diphenhydramine HCl (Benadryl) 25 mg 1X PRN  PRN IV ITCHING;  Start 3/23/20 at 

07:30;  Stop 3/24/20 at 07:29;  Status DC


Sodium Chloride 1,000 ml @  400 mls/hr Q2H30M PRN IV PATENCY;  Start 3/23/20 at 

07:28;  Stop 3/23/20 at 19:27;  Status DC


Info (PHARMACY MONITORING -- do not chart) 1 each PRN DAILY  PRN MC SEE 

COMMENTS;  Start 3/23/20 at 07:30;  Stop 4/3/20 at 13:01;  Status DC


Metronidazole 100 ml @  100 mls/hr Q6HRS IV  Last administered on 4/8/20at 

06:26;  Start 3/23/20 at 08:30;  Stop 4/8/20 at 09:58;  Status DC


Micafungin Sodium 100 mg/Dextrose 100 ml @  100 mls/hr Q24H IV  Last 

administered on 4/30/20at 08:18;  Start 3/23/20 at 09:00;  Stop 4/30/20 at 

20:58;  Status DC


Propofol 0 ml @ As Directed STK-MED ONCE IV ;  Start 3/23/20 at 07:53;  Stop 

3/23/20 at 07:53;  Status DC


Etomidate (Amidate) 20 mg STK-MED ONCE IV ;  Start 3/23/20 at 07:53;  Stop 

3/23/20 at 07:54;  Status DC


Midazolam HCl (Versed) 5 mg STK-MED ONCE .ROUTE ;  Start 3/23/20 at 07:57;  Stop

3/23/20 at 07:57;  Status DC


Fentanyl Citrate 30 ml @ 0 mls/hr CONT  PRN IV SEE PROTOCOL Last administered on

4/17/20at 06:12;  Start 3/23/20 at 08:15;  Stop 4/17/20 at 09:19;  Status DC


Artificial Tears (Artificial Tears) 1 drop PRN Q1HR  PRN OU DRY EYE, 1st choice;

 Start 3/23/20 at 08:15;  Stop 4/29/20 at 05:31;  Status DC


Midazolam HCl 50 mg/Sodium Chloride 50 ml @ 0 mls/hr CONT  PRN IV SEE PROTOCOL 

Last administered on 3/26/20at 22:39;  Start 3/23/20 at 08:15;  Stop 3/28/20 at 

15:59;  Status DC


Etomidate (Amidate) 8 mg 1X  ONCE IV  Last administered on 3/23/20at 08:33;  

Start 3/23/20 at 08:30;  Stop 3/23/20 at 08:31;  Status DC


Succinylcholine Chloride (Anectine) 120 mg 1X  ONCE IV  Last administered on 

3/23/20at 08:34;  Start 3/23/20 at 08:30;  Stop 3/23/20 at 08:31;  Status DC


Midazolam HCl (Versed) 5 mg 1X  ONCE IV ;  Start 3/23/20 at 08:30;  Stop 3/23/20

at 08:31;  Status DC


Potassium Chloride 15 meq/ Bicarbonate Dialysis Soln w/ out KCl 5,007.5 ml  @ 

1,000 mls/ hr Q5H1M IV  Last administered on 3/24/20at 11:11;  Start 3/23/20 at 

12:00;  Stop 3/24/20 at 11:15;  Status DC


Potassium Chloride 15 meq/ Bicarbonate Dialysis Soln w/ out KCl 5,007.5 ml  @ 

1,000 mls/ hr Q5H1M IV  Last administered on 3/24/20at 11:12;  Start 3/23/20 at 

12:00;  Stop 3/24/20 at 11:17;  Status DC


Potassium Chloride 15 meq/ Bicarbonate Dialysis Soln w/ out KCl 5,007.5 ml  @ 

1,000 mls/ hr Q5H1M IV  Last administered on 3/24/20at 11:11;  Start 3/23/20 at 

12:00;  Stop 3/24/20 at 11:19;  Status DC


Sodium Chloride 90 meq/Potassium Chloride 15 meq/ Potassium Phosphate 10 mmol/ 

Magnesium Sulfate 10 meq/Calcium Gluconate 20 meq/ Multivitamins 10 ml/Chromium/

Copper/Manganese/ Seleni/Zn 0.5 ml/ Total Parenteral Nutrition/Amino 

Acids/Dextrose/ Fat Emulsion Intravenous 1,400 ml @  58.333 mls/ hr TPN  CONT IV

 Last administered on 3/23/20at 21:42;  Start 3/23/20 at 22:00;  Stop 3/24/20 at

21:59;  Status DC


Heparin Sodium (Porcine) (Heparin Sodium) 5,000 unit Q8HRS SQ  Last administered

on 3/28/20at 05:55;  Start 3/23/20 at 15:00;  Stop 3/28/20 at 13:28;  Status DC


Meropenem 500 mg/ Sodium Chloride 50 ml @  100 mls/hr Q6HRS IV  Last 

administered on 3/25/20at 06:00;  Start 3/24/20 at 09:00;  Stop 3/25/20 at 

07:29;  Status DC


Potassium Phosphate 20 mmol/ Sodium Chloride 106.6667 ml @  51.667 m... 1X  ONCE

IV  Last administered on 3/24/20at 11:22;  Start 3/24/20 at 10:15;  Stop 3/24/20

at 12:18;  Status DC


Acetaminophen (Tylenol Supp) 650 mg PRN Q6HRS  PRN OK MILD PAIN / TEMP > 100.3'F

Last administered on 5/5/20at 09:12;  Start 3/24/20 at 10:30


Potassium Chloride/Water 100 ml @  100 mls/hr Q1H IV  Last administered on 

3/24/20at 12:12;  Start 3/24/20 at 11:00;  Stop 3/24/20 at 12:59;  Status DC


Potassium Chloride 20 meq/ Bicarbonate Dialysis Soln w/ out KCl 5,010 ml @  

1,000 mls/hr Q5H1M IV  Last administered on 3/25/20at 08:48;  Start 3/24/20 at 

12:00;  Stop 3/25/20 at 13:03;  Status DC


Potassium Chloride 20 meq/ Bicarbonate Dialysis Soln w/ out KCl 5,010 ml @  

1,000 mls/hr Q5H1M IV  Last administered on 3/29/20at 14:52;  Start 3/24/20 at 

11:30;  Stop 3/29/20 at 19:59;  Status DC


Potassium Chloride 20 meq/ Bicarbonate Dialysis Soln w/ out KCl 5,010 ml @  

1,000 mls/hr Q5H1M IV  Last administered on 3/29/20at 14:53;  Start 3/24/20 at 

11:30;  Stop 3/29/20 at 19:59;  Status DC


Sodium Chloride 90 meq/Potassium Chloride 15 meq/ Potassium Phosphate 15 mmol/ 

Magnesium Sulfate 10 meq/Calcium Gluconate 15 meq/ Multivitamins 10 ml/Chromium/

Copper/Manganese/ Seleni/Zn 0.5 ml/ Total Parenteral Nutrition/Amino 

Acids/Dextrose/ Fat Emulsion Intravenous 1,400 ml @  58.333 mls/ hr TPN  CONT IV

 Last administered on 3/24/20at 22:17;  Start 3/24/20 at 22:00;  Stop 3/25/20 at

21:59;  Status DC


Cefepime HCl (Maxipime) 2 gm Q12HR IVP  Last administered on 4/7/20at 20:56;  

Start 3/25/20 at 09:00;  Stop 4/8/20 at 09:58;  Status DC


Daptomycin 500 mg/ Sodium Chloride 50 ml @  100 mls/hr Q48H IV  Last 

administered on 4/10/20at 09:57;  Start 3/25/20 at 08:30;  Stop 4/10/20 at 

10:07;  Status DC


Lidocaine HCl (Buffered Lidocaine 1%) 3 ml 1X  ONCE INJ  Last administered on 

3/25/20at 10:27;  Start 3/25/20 at 10:30;  Stop 3/25/20 at 10:31;  Status DC


Potassium Phosphate 20 mmol/ Sodium Chloride 106.6667 ml @  51.667 m... 1X  ONCE

IV  Last administered on 3/25/20at 12:51;  Start 3/25/20 at 13:00;  Stop 3/25/20

at 15:03;  Status DC


Sodium Chloride 90 meq/Potassium Chloride 15 meq/ Potassium Phosphate 18 mmol/ 

Magnesium Sulfate 8 meq/Calcium Gluconate 15 meq/ Multivitamins 10 ml/Chromium/ 

Copper/Manganese/ Seleni/Zn 0.5 ml/ Total Parenteral Nutrition/Amino 

Acids/Dextrose/ Fat Emulsion Intravenous 1,400 ml @  58.333 mls/ hr TPN  CONT IV

 Last administered on 3/25/20at 22:16;  Start 3/25/20 at 22:00;  Stop 3/26/20 at

21:59;  Status DC


Potassium Chloride 20 meq/ Bicarbonate Dialysis Soln w/ out KCl 5,010 ml @  

1,000 mls/hr Q5H1M IV  Last administered on 3/29/20at 14:54;  Start 3/25/20 at 

16:00;  Stop 3/29/20 at 19:59;  Status DC


Multi-Ingred Cream/Lotion/Oil/ Oint (Artificial Tears Eye Ointment) 1 thomas PRN 

Q1HR  PRN OU DRY EYE, 2nd choice Last administered on 4/13/20at 08:19;  Start 

3/25/20 at 17:30


Sodium Chloride 90 meq/Potassium Chloride 15 meq/ Potassium Phosphate 18 mmol/ 

Magnesium Sulfate 8 meq/Calcium Gluconate 15 meq/ Multivitamins 10 ml/Chromium/ 

Copper/Manganese/ Seleni/Zn 0.5 ml/ Total Parenteral Nutrition/Amino 

Acids/Dextrose/ Fat Emulsion Intravenous 1,400 ml @  58.333 mls/ hr TPN  CONT IV

 Last administered on 3/26/20at 22:00;  Start 3/26/20 at 22:00;  Stop 3/27/20 at

21:59;  Status DC


Albumin Human 500 ml @  125 mls/hr 1X  ONCE IV ;  Start 3/26/20 at 14:15;  Stop 

3/26/20 at 18:14;  Status DC


Sodium Chloride 90 meq/Potassium Chloride 15 meq/ Potassium Phosphate 18 mmol/ 

Magnesium Sulfate 8 meq/Calcium Gluconate 15 meq/ Multivitamins 10 ml/Chromium/ 

Copper/Manganese/ Seleni/Zn 0.5 ml/ Insulin Human Regular 10 unit/ Total 

Parenteral Nutrition/Amino Acids/Dextrose/ Fat Emulsion Intravenous 1,400 ml @  

58.333 mls/ hr TPN  CONT IV  Last administered on 3/27/20at 21:43;  Start 

3/27/20 at 22:00;  Stop 3/28/20 at 21:59;  Status DC


Lidocaine HCl (Buffered Lidocaine 1%) 3 ml STK-MED ONCE .ROUTE ;  Start 3/25/20 

at 10:00;  Stop 3/27/20 at 13:57;  Status DC


Midazolam HCl 100 mg/Sodium Chloride 100 ml @ 7 mls/hr CONT  PRN IV SEE PROTOCOL

Last administered on 4/8/20at 15:35;  Start 3/28/20 at 16:00


Sodium Chloride 90 meq/Potassium Chloride 15 meq/ Potassium Phosphate 18 mmol/ 

Magnesium Sulfate 8 meq/Calcium Gluconate 15 meq/ Multivitamins 10 ml/Chromium/ 

Copper/Manganese/ Seleni/Zn 0.5 ml/ Insulin Human Regular 15 unit/ Total 

Parenteral Nutrition/Amino Acids/Dextrose/ Fat Emulsion Intravenous 1,400 ml @  

58.333 mls/ hr TPN  CONT IV  Last administered on 3/28/20at 20:34;  Start 

3/28/20 at 22:00;  Stop 3/29/20 at 21:59;  Status DC


Info ([facility]cu Electrolyte Protocol) 1 ea CONT PRN  PRN MC PER PROTOCOL;  Start 

3/29/20 at 13:15


Sodium Chloride 90 meq/Potassium Chloride 15 meq/ Potassium Phosphate 18 mmol/ 

Magnesium Sulfate 8 meq/Calcium Gluconate 15 meq/ Multivitamins 10 ml/Chromium/ 

Copper/Manganese/ Seleni/Zn 0.5 ml/ Insulin Human Regular 15 unit/ Total 

Parenteral Nutrition/Amino Acids/Dextrose/ Fat Emulsion Intravenous 1,400 ml @  

58.333 mls/ hr TPN  CONT IV  Last administered on 3/29/20at 22:05;  Start 

3/29/20 at 22:00;  Stop 3/30/20 at 21:59;  Status DC


Potassium Chloride 15 meq/ Bicarbonate Dialysis Soln w/ out KCl 5,007.5 ml  @ 

1,000 mls/ hr Q5H1M IV  Last administered on 4/1/20at 18:14;  Start 3/29/20 at 

20:00;  Stop 4/2/20 at 13:08;  Status DC


Potassium Chloride 15 meq/ Bicarbonate Dialysis Soln w/ out KCl 5,007.5 ml  @ 

1,000 mls/ hr Q5H1M IV  Last administered on 4/1/20at 18:14;  Start 3/29/20 at 

20:00;  Stop 4/2/20 at 13:08;  Status DC


Potassium Chloride 15 meq/ Bicarbonate Dialysis Soln w/ out KCl 5,007.5 ml  @ 

1,000 mls/ hr Q5H1M IV  Last administered on 4/1/20at 18:14;  Start 3/29/20 at 

20:00;  Stop 4/2/20 at 13:08;  Status DC


Iohexol (Omnipaque 240 Mg/ml) 30 ml 1X  ONCE PO  Last administered on 3/30/20at 

11:30;  Start 3/30/20 at 11:30;  Stop 3/30/20 at 11:33;  Status DC


Info (CONTRAST GIVEN -- Rx MONITORING) 1 each PRN DAILY  PRN MC SEE COMMENTS;  

Start 3/30/20 at 11:45;  Stop 4/1/20 at 11:44;  Status DC


Sodium Chloride 90 meq/Potassium Chloride 15 meq/ Potassium Phosphate 18 mmol/ 

Magnesium Sulfate 8 meq/Calcium Gluconate 15 meq/ Multivitamins 10 ml/Chromium/ 

Copper/Manganese/ Seleni/Zn 0.5 ml/ Insulin Human Regular 15 unit/ Total 

Parenteral Nutrition/Amino Acids/Dextrose/ Fat Emulsion Intravenous 1,400 ml @  

58.333 mls/ hr TPN  CONT IV  Last administered on 3/30/20at 21:47;  Start 

3/30/20 at 22:00;  Stop 3/31/20 at 21:59;  Status DC


Sodium Chloride 90 meq/Potassium Chloride 15 meq/ Potassium Phosphate 18 mmol/ 

Magnesium Sulfate 8 meq/Calcium Gluconate 15 meq/ Multivitamins 10 ml/Chromium/ 

Copper/Manganese/ Seleni/Zn 0.5 ml/ Insulin Human Regular 20 unit/ Total 

Parenteral Nutrition/Amino Acids/Dextrose/ Fat Emulsion Intravenous 1,400 ml @  

58.333 mls/ hr TPN  CONT IV  Last administered on 3/31/20at 21:36;  Start 

3/31/20 at 22:00;  Stop 4/1/20 at 21:59;  Status DC


Alteplase, Recombinant (Cathflo For Central Catheter Clearance) 1 mg 1X  ONCE 

INT CAT  Last administered on 3/31/20at 20:03;  Start 3/31/20 at 19:30;  Stop 

3/31/20 at 19:46;  Status DC


Alteplase, Recombinant (Cathflo For Central Catheter Clearance) 1 mg 1X  ONCE 

INT CAT  Last administered on 3/31/20at 22:05;  Start 3/31/20 at 22:00;  Stop 

3/31/20 at 22:01;  Status DC


Sodium Chloride 90 meq/Potassium Chloride 15 meq/ Potassium Phosphate 18 mmol/ 

Magnesium Sulfate 8 meq/Calcium Gluconate 15 meq/ Multivitamins 10 ml/Chromium/ 

Copper/Manganese/ Seleni/Zn 0.5 ml/ Insulin Human Regular 20 unit/ Total 

Parenteral Nutrition/Amino Acids/Dextrose/ Fat Emulsion Intravenous 1,400 ml @  

58.333 mls/ hr TPN  CONT IV  Last administered on 4/1/20at 21:30;  Start 4/1/20 

at 22:00;  Stop 4/2/20 at 21:59;  Status DC


Dexmedetomidine HCl 400 mcg/ Sodium Chloride 100 ml @ 0 mls/hr CONT  PRN IV 

ANXIETY / AGITATION Last administered on 5/22/20at 08:13;  Start 4/2/20 at 08:15


Sodium Chloride 500 ml @  500 mls/hr 1X PRN  PRN IV ELEVATED BP, SEE COMMENTS;  

Start 4/2/20 at 08:15


Atropine Sulfate (ATROPINE 0.5mg SYRINGE) 0.5 mg PRN Q5MIN  PRN IV SEE COMMENTS;

 Start 4/2/20 at 08:15


Furosemide (Lasix) 20 mg 1X  ONCE IVP  Last administered on 4/2/20at 08:19;  

Start 4/2/20 at 08:15;  Stop 4/2/20 at 08:16;  Status DC


Lidocaine HCl (Buffered Lidocaine 1%) 3 ml STK-MED ONCE .ROUTE ;  Start 4/2/20 

at 08:39;  Stop 4/2/20 at 08:39;  Status DC


Lidocaine HCl (Buffered Lidocaine 1%) 6 ml 1X  ONCE INJ  Last administered on 

4/2/20at 09:05;  Start 4/2/20 at 09:00;  Stop 4/2/20 at 09:06;  Status DC


Sodium Chloride 90 meq/Potassium Chloride 15 meq/ Potassium Phosphate 18 mmol/ 

Magnesium Sulfate 8 meq/Calcium Gluconate 15 meq/ Multivitamins 10 ml/Chromium/ 

Copper/Manganese/ Seleni/Zn 0.5 ml/ Insulin Human Regular 20 unit/ Total 

Parenteral Nutrition/Amino Acids/Dextrose/ Fat Emulsion Intravenous 1,400 ml @  

58.333 mls/ hr TPN  CONT IV  Last administered on 4/2/20at 22:45;  Start 4/2/20 

at 22:00;  Stop 4/3/20 at 21:59;  Status DC


Sodium Chloride 1,000 ml @  1,000 mls/hr Q1H PRN IV hypotension;  Start 4/3/20 

at 07:30;  Stop 4/3/20 at 13:29;  Status DC


Albumin Human 200 ml @  200 mls/hr 1X PRN  PRN IV Hypotension Last administered 

on 4/3/20at 09:36;  Start 4/3/20 at 07:30;  Stop 4/3/20 at 13:29;  Status DC


Sodium Chloride (Normal Saline Flush) 10 ml 1X PRN  PRN IV AP catheter pack;  

Start 4/3/20 at 07:30;  Stop 4/3/20 at 21:29;  Status DC


Sodium Chloride (Normal Saline Flush) 10 ml 1X PRN  PRN IV  catheter pack;  

Start 4/3/20 at 07:30;  Stop 4/4/20 at 07:29;  Status DC


Sodium Chloride 1,000 ml @  400 mls/hr Q2H30M PRN IV PATENCY;  Start 4/3/20 at 

07:30;  Stop 4/3/20 at 19:29;  Status DC


Info (PHARMACY MONITORING -- do not chart) 1 each PRN DAILY  PRN MC SEE 

COMMENTS;  Start 4/3/20 at 07:30;  Stop 4/3/20 at 13:02;  Status DC


Info (PHARMACY MONITORING -- do not chart) 1 each PRN DAILY  PRN MC SEE 

COMMENTS;  Start 4/3/20 at 07:30;  Stop 4/5/20 at 12:45;  Status DC


Sodium Chloride 90 meq/Potassium Chloride 15 meq/ Potassium Phosphate 10 mmol/ 

Magnesium Sulfate 8 meq/Calcium Gluconate 15 meq/ Multivitamins 10 ml/Chromium/ 

Copper/Manganese/ Seleni/Zn 0.5 ml/ Insulin Human Regular 25 unit/ Total 

Parenteral Nutrition/Amino Acids/Dextrose/ Fat Emulsion Intravenous 1,400 ml @  

58.333 mls/ hr TPN  CONT IV  Last administered on 4/3/20at 22:19;  Start 4/3/20 

at 22:00;  Stop 4/4/20 at 21:59;  Status DC


Heparin Sodium (Porcine) (Heparin Sodium) 5,000 unit Q12HR SQ  Last administered

on 4/26/20at 08:59;  Start 4/3/20 at 21:00;  Stop 4/26/20 at 10:05;  Status DC


Ondansetron HCl (Zofran) 4 mg PRN Q6HRS  PRN IV NAUSEA/VOMITING;  Start 4/6/20 

at 07:00;  Stop 4/7/20 at 06:59;  Status DC


Fentanyl Citrate (Fentanyl 2ml Vial) 25 mcg PRN Q5MIN  PRN IV MILD PAIN 1-3;  

Start 4/6/20 at 07:00;  Stop 4/7/20 at 06:59;  Status DC


Fentanyl Citrate (Fentanyl 2ml Vial) 50 mcg PRN Q5MIN  PRN IV MODERATE TO SEVERE

PAIN;  Start 4/6/20 at 07:00;  Stop 4/7/20 at 06:59;  Status DC


Ringer's Solution 1,000 ml @  30 mls/hr Q24H IV ;  Start 4/6/20 at 07:00;  Stop 

4/6/20 at 18:59;  Status DC


Lidocaine HCl (Xylocaine-Mpf 1% 2ml Vial) 2 ml PRN 1X  PRN ID PRIOR TO IV START;

 Start 4/6/20 at 07:00;  Stop 4/7/20 at 06:59;  Status DC


Prochlorperazine Edisylate (Compazine) 5 mg PACU PRN  PRN IV NAUSEA, MRX1;  

Start 4/6/20 at 07:00;  Stop 4/7/20 at 06:59;  Status DC


Sodium Chloride 1,000 ml @  1,000 mls/hr Q1H PRN IV hypotension;  Start 4/4/20 

at 09:10;  Stop 4/4/20 at 15:09;  Status DC


Albumin Human 200 ml @  200 mls/hr 1X PRN  PRN IV Hypotension Last administered 

on 4/4/20at 10:10;  Start 4/4/20 at 09:15;  Stop 4/4/20 at 15:14;  Status DC


Sodium Chloride 1,000 ml @  400 mls/hr Q2H30M PRN IV PATENCY;  Start 4/4/20 at 

09:10;  Stop 4/4/20 at 21:09;  Status DC


Info (PHARMACY MONITORING -- do not chart) 1 each PRN DAILY  PRN MC SEE 

COMMENTS;  Start 4/4/20 at 09:15;  Stop 4/5/20 at 12:45;  Status DC


Info (PHARMACY MONITORING -- do not chart) 1 each PRN DAILY  PRN MC SEE 

COMMENTS;  Start 4/4/20 at 09:15;  Stop 4/5/20 at 12:45;  Status DC


Sodium Chloride 90 meq/Potassium Chloride 15 meq/ Potassium Phosphate 10 mmol/ 

Magnesium Sulfate 8 meq/Calcium Gluconate 15 meq/ Multivitamins 10 ml/Chromium/ 

Copper/Manganese/ Seleni/Zn 0.5 ml/ Insulin Human Regular 25 unit/ Total Parent

eral Nutrition/Amino Acids/Dextrose/ Fat Emulsion Intravenous 1,400 ml @  58.333

mls/ hr TPN  CONT IV  Last administered on 4/4/20at 22:10;  Start 4/4/20 at 

22:00;  Stop 4/5/20 at 21:59;  Status DC


Magnesium Sulfate 50 ml @ 25 mls/hr PRN DAILY  PRN IV for Mag < 1.7 on am labs 

Last administered on 4/20/20at 17:27;  Start 4/5/20 at 09:15


Sodium Chloride 90 meq/Potassium Chloride 15 meq/ Potassium Phosphate 10 mmol/ 

Magnesium Sulfate 8 meq/Calcium Gluconate 15 meq/ Multivitamins 10 ml/Chromium/ 

Copper/Manganese/ Seleni/Zn 0.5 ml/ Insulin Human Regular 25 unit/ Total 

Parenteral Nutrition/Amino Acids/Dextrose/ Fat Emulsion Intravenous 1,400 ml @  

58.333 mls/ hr TPN  CONT IV  Last administered on 4/5/20at 21:20;  Start 4/5/20 

at 22:00;  Stop 4/6/20 at 21:59;  Status DC


Sodium Chloride 1,000 ml @  1,000 mls/hr Q1H PRN IV hypotension;  Start 4/5/20 

at 12:23;  Stop 4/5/20 at 18:22;  Status DC


Albumin Human 200 ml @  200 mls/hr 1X  ONCE IV  Last administered on 4/5/20at 

13:34;  Start 4/5/20 at 12:30;  Stop 4/5/20 at 13:29;  Status DC


Diphenhydramine HCl (Benadryl) 25 mg 1X PRN  PRN IV ITCHING;  Start 4/5/20 at 

12:30;  Stop 4/6/20 at 12:29;  Status DC


Diphenhydramine HCl (Benadryl) 25 mg 1X PRN  PRN IV ITCHING;  Start 4/5/20 at 

12:30;  Stop 4/6/20 at 12:29;  Status DC


Info (PHARMACY MONITORING -- do not chart) 1 each PRN DAILY  PRN MC SEE 

COMMENTS;  Start 4/5/20 at 12:30;  Status Cancel


Bupivacaine HCl/ Epinephrine Bitart (Sensorcain-Epi 0.5%-1:[id number] Mpf) 30 ml 

STK-MED ONCE .ROUTE  Last administered on 4/6/20at 11:44;  Start 4/6/20 at 

11:00;  Stop 4/6/20 at 11:01;  Status DC


Cellulose (Surgicel Fibrillar 1x2) 1 each STK-MED ONCE .ROUTE ;  Start 4/6/20 at

11:00;  Stop 4/6/20 at 11:01;  Status DC


Sodium Chloride 90 meq/Potassium Chloride 15 meq/ Potassium Phosphate 10 mmol/ 

Magnesium Sulfate 12 meq/Calcium Gluconate 15 meq/ Multivitamins 10 ml/Chromium/

Copper/Manganese/ Seleni/Zn 0.5 ml/ Insulin Human Regular 25 unit/ Total 

Parenteral Nutrition/Amino Acids/Dextrose/ Fat Emulsion Intravenous 1,400 ml @  

58.333 mls/ hr TPN  CONT IV  Last administered on 4/6/20at 22:24;  Start 4/6/20 

at 22:00;  Stop 4/7/20 at 21:59;  Status DC


Propofol 20 ml @ As Directed STK-MED ONCE IV ;  Start 4/6/20 at 11:07;  Stop 

4/6/20 at 11:07;  Status DC


Cellulose (Surgicel Hemostat 4x8) 1 each STK-MED ONCE .ROUTE  Last administered 

on 4/6/20at 11:44;  Start 4/6/20 at 11:55;  Stop 4/6/20 at 11:56;  Status DC


Sevoflurane (Ultane) 60 ml STK-MED ONCE IH ;  Start 4/6/20 at 12:46;  Stop 

4/6/20 at 12:46;  Status DC


Sodium Chloride 1,000 ml @  1,000 mls/hr Q1H PRN IV hypotension;  Start 4/6/20 

at 13:51;  Stop 4/6/20 at 19:50;  Status DC


Albumin Human 200 ml @  200 mls/hr 1X PRN  PRN IV Hypotension Last administered 

on 4/6/20at 14:51;  Start 4/6/20 at 14:00;  Stop 4/6/20 at 19:59;  Status DC


Diphenhydramine HCl (Benadryl) 25 mg 1X PRN  PRN IV ITCHING;  Start 4/6/20 at 

14:00;  Stop 4/7/20 at 13:59;  Status DC


Diphenhydramine HCl (Benadryl) 25 mg 1X PRN  PRN IV ITCHING;  Start 4/6/20 at 

14:00;  Stop 4/7/20 at 13:59;  Status DC


Sodium Chloride 1,000 ml @  400 mls/hr Q2H30M PRN IV PATENCY;  Start 4/6/20 at 

13:51;  Stop 4/7/20 at 01:50;  Status DC


Info (PHARMACY MONITORING -- do not chart) 1 each PRN DAILY  PRN MC SEE 

COMMENTS;  Start 4/6/20 at 14:00;  Stop 4/9/20 at 08:16;  Status DC


Heparin Sodium (Porcine) (Hep Lock Adult) 500 unit STK-MED ONCE IVP ;  Start 

4/7/20 at 09:29;  Stop 4/7/20 at 09:30;  Status DC


Sodium Chloride 1,000 ml @  1,000 mls/hr Q1H PRN IV hypotension;  Start 4/7/20 

at 10:43;  Stop 4/7/20 at 16:42;  Status DC


Sodium Chloride 1,000 ml @  400 mls/hr Q2H30M PRN IV PATENCY;  Start 4/7/20 at 

10:43;  Stop 4/7/20 at 22:42;  Status DC


Info (PHARMACY MONITORING -- do not chart) 1 each PRN DAILY  PRN MC SEE 

COMMENTS;  Start 4/7/20 at 10:45;  Status UNV


Info (PHARMACY MONITORING -- do not chart) 1 each PRN DAILY  PRN MC SEE 

COMMENTS;  Start 4/7/20 at 10:45;  Status UNV


Sodium Chloride 90 meq/Potassium Chloride 15 meq/ Magnesium Sulfate 12 

meq/Calcium Gluconate 15 meq/ Multivitamins 10 ml/Chromium/ Copper/Manganese/ 

Seleni/Zn 0.5 ml/ Insulin Human Regular 25 unit/ Total Parenteral 

Nutrition/Amino Acids/Dextrose/ Fat Emulsion Intravenous 1,400 ml @  58.333 mls/

hr TPN  CONT IV  Last administered on 4/7/20at 22:13;  Start 4/7/20 at 22:00;  

Stop 4/8/20 at 21:59;  Status DC


Sodium Chloride 1,000 ml @  1,000 mls/hr Q1H PRN IV hypotension;  Start 4/8/20 

at 07:50;  Stop 4/8/20 at 13:49;  Status DC


Albumin Human 200 ml @  200 mls/hr 1X  ONCE IV ;  Start 4/8/20 at 08:00;  Stop 

4/8/20 at 08:53;  Status DC


Diphenhydramine HCl (Benadryl) 25 mg 1X PRN  PRN IV ITCHING;  Start 4/8/20 at 

08:00;  Stop 4/9/20 at 07:59;  Status DC


Diphenhydramine HCl (Benadryl) 25 mg 1X PRN  PRN IV ITCHING;  Start 4/8/20 at 

08:00;  Stop 4/9/20 at 07:59;  Status DC


Info (PHARMACY MONITORING -- do not chart) 1 each PRN DAILY  PRN MC SEE 

COMMENTS;  Start 4/8/20 at 08:00;  Stop 4/9/20 at 08:16;  Status DC


Albumin Human 50 ml @ 50 mls/hr 1X  ONCE IV ;  Start 4/8/20 at 08:53;  Stop 

4/8/20 at 08:56;  Status DC


Albumin Human 200 ml @  50 mls/hr PRN 1X  PRN IV HYPOTENSION Last administered 

on 4/14/20at 11:54;  Start 4/8/20 at 09:00;  Stop 5/21/20 at 11:14;  Status DC


Meropenem 500 mg/ Sodium Chloride 50 ml @  100 mls/hr Q12H IV  Last administered

on 4/28/20at 10:45;  Start 4/8/20 at 10:00;  Stop 4/28/20 at 12:37;  Status DC


Sodium Chloride 90 meq/Magnesium Sulfate 12 meq/ Calcium Gluconate 15 meq/ 

Multivitamins 10 ml/Chromium/ Copper/Manganese/ Seleni/Zn 0.5 ml/ Insulin Human 

Regular 25 unit/ Total Parenteral Nutrition/Amino Acids/Dextrose/ Fat Emulsion 

Intravenous 1,400 ml @  58.333 mls/ hr TPN  CONT IV  Last administered on 

4/8/20at 21:41;  Start 4/8/20 at 22:00;  Stop 4/9/20 at 21:59;  Status DC


Sodium Chloride 1,000 ml @  1,000 mls/hr Q1H PRN IV hypotension;  Start 4/9/20 

at 07:58;  Stop 4/9/20 at 13:57;  Status DC


Albumin Human 200 ml @  200 mls/hr 1X PRN  PRN IV Hypotension Last administered 

on 4/9/20at 09:30;  Start 4/9/20 at 08:00;  Stop 4/9/20 at 13:59;  Status DC


Sodium Chloride 1,000 ml @  400 mls/hr Q2H30M PRN IV PATENCY;  Start 4/9/20 at 

07:58;  Stop 4/9/20 at 19:57;  Status DC


Info (PHARMACY MONITORING -- do not chart) 1 each PRN DAILY  PRN MC SEE 

COMMENTS;  Start 4/9/20 at 08:00;  Status Cancel


Info (PHARMACY MONITORING -- do not chart) 1 each PRN DAILY  PRN MC SEE 

COMMENTS;  Start 4/9/20 at 08:15;  Status UNV


Sodium Chloride 90 meq/Potassium Phosphate 5 mmol/ Magnesium Sulfate 12 

meq/Calcium Gluconate 15 meq/ Multivitamins 10 ml/Chromium/ Copper/Manganese/ 

Seleni/Zn 0.5 ml/ Insulin Human Regular 30 unit/ Total Parenteral 

Nutrition/Amino Acids/Dextrose/ Fat Emulsion Intravenous 1,400 ml @  58.333 mls/

hr TPN  CONT IV  Last administered on 4/9/20at 22:08;  Start 4/9/20 at 22:00;  

Stop 4/10/20 at 21:59;  Status DC


Linezolid/Dextrose 300 ml @  300 mls/hr Q12HR IV  Last administered on 4/20/20at

20:40;  Start 4/10/20 at 11:00;  Stop 4/21/20 at 08:10;  Status DC


Sodium Chloride 90 meq/Potassium Phosphate 15 mmol/ Magnesium Sulfate 12 

meq/Calcium Gluconate 15 meq/ Multivitamins 10 ml/Chromium/ Copper/Manganese/ 

Seleni/Zn 0.5 ml/ Insulin Human Regular 30 unit/ Total Parenteral 

Nutrition/Amino Acids/Dextrose/ Fat Emulsion Intravenous 1,400 ml @  58.333 mls/

hr TPN  CONT IV  Last administered on 4/10/20at 21:49;  Start 4/10/20 at 22:00; 

Stop 4/11/20 at 21:59;  Status DC


Sodium Chloride 90 meq/Potassium Phosphate 15 mmol/ Magnesium Sulfate 12 

meq/Calcium Gluconate 15 meq/ Multivitamins 10 ml/Chromium/ Copper/Manganese/ 

Seleni/Zn 0.5 ml/ Insulin Human Regular 40 unit/ Total Parenteral Nutr

ition/Amino Acids/Dextrose/ Fat Emulsion Intravenous 1,400 ml @  58.333 mls/ hr 

TPN  CONT IV  Last administered on 4/11/20at 21:21;  Start 4/11/20 at 22:00;  

Stop 4/12/20 at 21:59;  Status DC


Sodium Chloride 1,000 ml @  1,000 mls/hr Q1H PRN IV hypotension;  Start 4/11/20 

at 13:26;  Stop 4/11/20 at 19:25;  Status DC


Albumin Human 200 ml @  200 mls/hr 1X PRN  PRN IV Hypotension Last administered 

on 4/11/20at 15:00;  Start 4/11/20 at 13:30;  Stop 4/11/20 at 19:29;  Status DC


Sodium Chloride (Normal Saline Flush) 10 ml 1X PRN  PRN IV AP catheter pack;  

Start 4/11/20 at 13:30;  Stop 4/12/20 at 13:29;  Status DC


Sodium Chloride (Normal Saline Flush) 10 ml 1X PRN  PRN IV  catheter pack;  

Start 4/11/20 at 13:30;  Stop 4/12/20 at 13:29;  Status DC


Sodium Chloride 1,000 ml @  400 mls/hr Q2H30M PRN IV PATENCY;  Start 4/11/20 at 

13:26;  Stop 4/12/20 at 01:25;  Status DC


Info (PHARMACY MONITORING -- do not chart) 1 each PRN DAILY  PRN MC SEE 

COMMENTS;  Start 4/11/20 at 13:30;  Stop 4/11/20 at 13:33;  Status DC


Info (PHARMACY MONITORING -- do not chart) 1 each PRN DAILY  PRN MC SEE 

COMMENTS;  Start 4/11/20 at 13:30;  Stop 4/11/20 at 13:34;  Status DC


Sodium Chloride 90 meq/Potassium Phosphate 19 mmol/ Magnesium Sulfate 12 

meq/Calcium Gluconate 15 meq/ Multivitamins 10 ml/Chromium/ Copper/Manganese/ 

Seleni/Zn 0.5 ml/ Insulin Human Regular 40 unit/ Total Parenteral 

Nutrition/Amino Acids/Dextrose/ Fat Emulsion Intravenous 1,400 ml @  58.333 mls/

hr TPN  CONT IV  Last administered on 4/12/20at 21:54;  Start 4/12/20 at 22:00; 

Stop 4/13/20 at 21:59;  Status DC


Sodium Chloride 1,000 ml @  1,000 mls/hr Q1H PRN IV hypotension;  Start 4/13/20 

at 09:35;  Stop 4/13/20 at 15:34;  Status DC


Albumin Human 200 ml @  200 mls/hr 1X PRN  PRN IV Hypotension;  Start 4/13/20 at

09:45;  Stop 4/13/20 at 15:44;  Status DC


Diphenhydramine HCl (Benadryl) 25 mg 1X PRN  PRN IV ITCHING;  Start 4/13/20 at 

09:45;  Stop 4/14/20 at 09:44;  Status DC


Diphenhydramine HCl (Benadryl) 25 mg 1X PRN  PRN IV ITCHING;  Start 4/13/20 at 

09:45;  Stop 4/14/20 at 09:44;  Status DC


Sodium Chloride 1,000 ml @  400 mls/hr Q2H30M PRN IV PATENCY;  Start 4/13/20 at 

09:35;  Stop 4/13/20 at 21:34;  Status DC


Info (PHARMACY MONITORING -- do not chart) 1 each PRN DAILY  PRN MC SEE 

COMMENTS;  Start 4/13/20 at 09:45;  Status Cancel


Sodium Chloride 100 meq/Potassium Phosphate 19 mmol/ Magnesium Sulfate 12 

meq/Calcium Gluconate 15 meq/ Multivitamins 10 ml/Chromium/ Copper/Manganese/ 

Seleni/Zn 0.5 ml/ Insulin Human Regular 40 unit/ Potassium Chloride 20 meq/ 

Total Parenteral Nutrition/Amino Acids/Dextrose/ Fat Emulsion Intravenous 1,400 

ml @  58.333 mls/ hr TPN  CONT IV  Last administered on 4/13/20at 22:02;  Start 

4/13/20 at 22:00;  Stop 4/14/20 at 21:59;  Status DC


Furosemide (Lasix) 40 mg 1X  ONCE IVP  Last administered on 4/13/20at 14:39;  

Start 4/13/20 at 14:30;  Stop 4/13/20 at 14:31;  Status DC


Metronidazole 100 ml @  100 mls/hr Q8HRS IV  Last administered on 4/21/20at 

06:04;  Start 4/14/20 at 10:00;  Stop 4/21/20 at 08:10;  Status DC


Sodium Chloride 1,000 ml @  1,000 mls/hr Q1H PRN IV hypotension;  Start 4/14/20 

at 08:00;  Stop 4/14/20 at 13:59;  Status DC


Albumin Human 200 ml @  200 mls/hr 1X PRN  PRN IV Hypotension;  Start 4/14/20 at

08:00;  Stop 4/14/20 at 13:59;  Status DC


Sodium Chloride 1,000 ml @  400 mls/hr Q2H30M PRN IV PATENCY;  Start 4/14/20 at 

08:00;  Stop 4/14/20 at 19:59;  Status DC


Info (PHARMACY MONITORING -- do not chart) 1 each PRN DAILY  PRN MC SEE 

COMMENTS;  Start 4/14/20 at 11:30;  Status UNV


Info (PHARMACY MONITORING -- do not chart) 1 each PRN DAILY  PRN MC SEE 

COMMENTS;  Start 4/14/20 at 11:30;  Stop 4/16/20 at 12:13;  Status DC


Sodium Chloride 100 meq/Potassium Phosphate 19 mmol/ Magnesium Sulfate 12 

meq/Calcium Gluconate 15 meq/ Multivitamins 10 ml/Chromium/ Copper/Manganese/ 

Seleni/Zn 0.5 ml/ Insulin Human Regular 40 unit/ Potassium Chloride 20 meq/ 

Total Parenteral Nutrition/Amino Acids/Dextrose/ Fat Emulsion Intravenous 1,400 

ml @  58.333 mls/ hr TPN  CONT IV  Last administered on 4/14/20at 21:52;  Start 

4/14/20 at 22:00;  Stop 4/15/20 at 21:59;  Status DC


Sodium Chloride (Normal Saline Flush) 10 ml QSHIFT  PRN IV AFTER MEDS AND BLOOD 

DRAWS;  Start 4/14/20 at 15:00;  Stop 5/12/20 at 11:27;  Status DC


Sodium Chloride (Normal Saline Flush) 10 ml PRN Q5MIN  PRN IV AFTER MEDS AND 

BLOOD DRAWS;  Start 4/14/20 at 15:00


Sodium Chloride (Normal Saline Flush) 20 ml PRN Q5MIN  PRN IV AFTER MEDS AND 

BLOOD DRAWS;  Start 4/14/20 at 15:00


Sodium Chloride 100 meq/Potassium Phosphate 19 mmol/ Magnesium Sulfate 12 

meq/Calcium Gluconate 15 meq/ Multivitamins 10 ml/Chromium/ Copper/Manganese/ 

Seleni/Zn 0.5 ml/ Insulin Human Regular 40 unit/ Potassium Chloride 20 meq/ 

Total Parenteral Nutrition/Amino Acids/Dextrose/ Fat Emulsion Intravenous 1,400 

ml @  58.333 mls/ hr TPN  CONT IV  Last administered on 4/15/20at 21:20;  Start 

4/15/20 at 22:00;  Stop 4/16/20 at 21:59;  Status DC


Lidocaine HCl (Buffered Lidocaine 1%) 3 ml STK-MED ONCE .ROUTE ;  Start 4/15/20 

at 13:16;  Stop 4/15/20 at 13:16;  Status DC


Lidocaine HCl (Buffered Lidocaine 1%) 6 ml 1X  ONCE INJ  Last administered on 

4/15/20at 13:45;  Start 4/15/20 at 13:30;  Stop 4/15/20 at 13:31;  Status DC


Albumin Human 100 ml @  100 mls/hr 1X  ONCE IV  Last administered on 4/15/20at 

15:41;  Start 4/15/20 at 15:00;  Stop 4/15/20 at 15:59;  Status DC


Albumin Human 50 ml @ 50 mls/hr 1X  ONCE IV  Last administered on 4/15/20at 

15:00;  Start 4/15/20 at 15:00;  Stop 4/15/20 at 15:59;  Status DC


Info (PHARMACY MONITORING -- do not chart) 1 each PRN DAILY  PRN MC SEE 

COMMENTS;  Start 4/16/20 at 11:30;  Status Cancel


Info (PHARMACY MONITORING -- do not chart) 1 each PRN DAILY  PRN MC SEE 

COMMENTS;  Start 4/16/20 at 11:30;  Status UNV


Sodium Chloride 100 meq/Potassium Phosphate 10 mmol/ Magnesium Sulfate 12 

meq/Calcium Gluconate 15 meq/ Multivitamins 10 ml/Chromium/ Copper/Manganese/ 

Seleni/Zn 0.5 ml/ Insulin Human Regular 35 unit/ Potassium Chloride 20 meq/ 

Total Parenteral Nutrition/Amino Acids/Dextrose/ Fat Emulsion Intravenous 1,400 

ml @  58.333 mls/ hr TPN  CONT IV  Last administered on 4/16/20at 22:10;  Start 

4/16/20 at 22:00;  Stop 4/17/20 at 21:59;  Status DC


Sodium Chloride 100 meq/Potassium Phosphate 5 mmol/ Magnesium Sulfate 12 

meq/Calcium Gluconate 15 meq/ Multivitamins 10 ml/Chromium/ Copper/Manganese/ 

Seleni/Zn 0.5 ml/ Insulin Human Regular 35 unit/ Potassium Chloride 20 meq/ 

Total Parenteral Nutrition/Amino Acids/Dextrose/ Fat Emulsion Intravenous 1,400 

ml @  58.333 mls/ hr TPN  CONT IV  Last administered on 4/17/20at 22:59;  Start 

4/17/20 at 22:00;  Stop 4/18/20 at 21:59;  Status DC


Sodium Chloride 1,000 ml @  1,000 mls/hr Q1H PRN IV hypotension;  Start 4/18/20 

at 08:27;  Stop 4/18/20 at 14:26;  Status DC


Albumin Human 200 ml @  200 mls/hr 1X PRN  PRN IV Hypotension Last administered 

on 4/18/20at 09:18;  Start 4/18/20 at 08:30;  Stop 4/18/20 at 14:29;  Status DC


Sodium Chloride 1,000 ml @  400 mls/hr Q2H30M PRN IV PATENCY;  Start 4/18/20 at 

08:27;  Stop 4/18/20 at 20:26;  Status DC


Info (PHARMACY MONITORING -- do not chart) 1 each PRN DAILY  PRN MC SEE 

COMMENTS;  Start 4/18/20 at 08:30;  Status Cancel


Info (PHARMACY MONITORING -- do not chart) 1 each PRN DAILY  PRN MC SEE 

COMMENTS;  Start 4/18/20 at 08:30;  Stop 4/26/20 at 13:10;  Status DC


Sodium Chloride 100 meq/Potassium Chloride 40 meq/ Magnesium Sulfate 15 

meq/Calcium Gluconate 15 meq/ Multivitamins 10 ml/Chromium/ Copper/Manganese/ 

Seleni/Zn 0.5 ml/ Insulin Human Regular 35 unit/ Total Parenteral 

Nutrition/Amino Acids/Dextrose/ Fat Emulsion Intravenous 1,400 ml @  58.333 mls/

hr TPN  CONT IV  Last administered on 4/18/20at 22:00;  Start 4/18/20 at 22:00; 

Stop 4/19/20 at 21:59;  Status DC


Potassium Chloride/Water 100 ml @  100 mls/hr 1X  ONCE IV  Last administered on 

4/18/20at 17:28;  Start 4/18/20 at 14:45;  Stop 4/18/20 at 15:44;  Status DC


Sodium Chloride 100 meq/Potassium Chloride 40 meq/ Magnesium Sulfate 15 

meq/Calcium Gluconate 15 meq/ Multivitamins 10 ml/Chromium/ Copper/Manganese/ 

Seleni/Zn 0.5 ml/ Insulin Human Regular 35 unit/ Total Parenteral Nutr

ition/Amino Acids/Dextrose/ Fat Emulsion Intravenous 1,400 ml @  58.333 mls/ hr 

TPN  CONT IV  Last administered on 4/19/20at 22:46;  Start 4/19/20 at 22:00;  

Stop 4/20/20 at 21:59;  Status DC


Sodium Chloride 100 meq/Potassium Chloride 40 meq/ Magnesium Sulfate 20 

meq/Calcium Gluconate 15 meq/ Multivitamins 10 ml/Chromium/ Copper/Manganese/ 

Seleni/Zn 0.5 ml/ Insulin Human Regular 35 unit/ Total Parenteral 

Nutrition/Amino Acids/Dextrose/ Fat Emulsion Intravenous 1,400 ml @  58.333 mls/

hr TPN  CONT IV  Last administered on 4/20/20at 22:31;  Start 4/20/20 at 22:00; 

Stop 4/21/20 at 21:59;  Status DC


Fentanyl Citrate (Fentanyl 2ml Vial) 50 mcg PRN Q2HR  PRN IVP PAIN Last 

administered on 4/27/20at 13:32;  Start 4/20/20 at 21:00;  Stop 4/28/20 at 

12:53;  Status DC


Fentanyl Citrate (Fentanyl 2ml Vial) 25 mcg PRN Q2HR  PRN IVP PAIN;  Start 

4/20/20 at 21:00;  Stop 4/28/20 at 12:54;  Status DC


Enoxaparin Sodium (Lovenox 100mg Syringe) 100 mg Q12HR SQ ;  Start 4/21/20 at 

21:00;  Status UNV


Amino Acids/ Glycerin/ Electrolytes 1,000 ml @  75 mls/hr [id number] IV ;  Start 

4/20/20 at 21:15;  Status UNV


Sodium Chloride 1,000 ml @  1,000 mls/hr Q1H PRN IV hypotension;  Start 4/21/20 

at 07:56;  Stop 4/21/20 at 13:55;  Status DC


Albumin Human 200 ml @  200 mls/hr 1X PRN  PRN IV Hypotension Last administered 

on 4/21/20at 08:40;  Start 4/21/20 at 08:00;  Stop 4/21/20 at 13:59;  Status DC


Sodium Chloride 1,000 ml @  400 mls/hr Q2H30M PRN IV PATENCY;  Start 4/21/20 at 

07:56;  Stop 4/21/20 at 19:55;  Status DC


Info (PHARMACY MONITORING -- do not chart) 1 each PRN DAILY  PRN MC SEE 

COMMENTS;  Start 4/21/20 at 08:00;  Status UNV


Info (PHARMACY MONITORING -- do not chart) 1 each PRN DAILY  PRN MC SEE 

COMMENTS;  Start 4/21/20 at 08:00;  Status UNV


Daptomycin 430 mg/ Sodium Chloride 50 ml @  100 mls/hr Q24H IV  Last 

administered on 4/21/20at 12:35;  Start 4/21/20 at 09:00;  Stop 4/21/20 at 

12:49;  Status DC


Sodium Chloride 100 meq/Potassium Chloride 40 meq/ Magnesium Sulfate 20 

meq/Calcium Gluconate 15 meq/ Multivitamins 10 ml/Chromium/ Copper/Manganese/ 

Seleni/Zn 0.5 ml/ Insulin Human Regular 35 unit/ Total Parenteral 

Nutrition/Amino Acids/Dextrose/ Fat Emulsion Intravenous 1,400 ml @  58.333 mls/

hr TPN  CONT IV  Last administered on 4/21/20at 21:26;  Start 4/21/20 at 22:00; 

Stop 4/22/20 at 21:59;  Status DC


Daptomycin 430 mg/ Sodium Chloride 50 ml @  100 mls/hr Q48H IV ;  Start 4/23/20 

at 09:00;  Stop 4/22/20 at 11:55;  Status DC


Sodium Chloride 100 meq/Potassium Chloride 40 meq/ Magnesium Sulfate 20 

meq/Calcium Gluconate 15 meq/ Multivitamins 10 ml/Chromium/ Copper/Manganese/ 

Seleni/Zn 0.5 ml/ Insulin Human Regular 35 unit/ Total Parenteral 

Nutrition/Amino Acids/Dextrose/ Fat Emulsion Intravenous 1,400 ml @  58.333 mls/

hr TPN  CONT IV  Last administered on 4/22/20at 22:27;  Start 4/22/20 at 22:00; 

Stop 4/23/20 at 21:59;  Status DC


Daptomycin 430 mg/ Sodium Chloride 50 ml @  100 mls/hr Q24H IV  Last 

administered on 4/24/20at 15:07;  Start 4/22/20 at 13:00;  Stop 4/25/20 at 

13:15;  Status DC


Sodium Chloride 100 meq/Potassium Chloride 40 meq/ Magnesium Sulfate 20 

meq/Calcium Gluconate 10 meq/ Multivitamins 10 ml/Chromium/ Copper/Manganese/ 

Seleni/Zn 0.5 ml/ Insulin Human Regular 35 unit/ Total Parenteral 

Nutrition/Amino Acids/Dextrose/ Fat Emulsion Intravenous 1,400 ml @  58.333 mls/

hr TPN  CONT IV  Last administered on 4/24/20at 00:06;  Start 4/23/20 at 22:00; 

Stop 4/24/20 at 21:59;  Status DC


Alteplase, Recombinant (Cathflo For Central Catheter Clearance) 1 mg 1X  ONCE 

INT CAT  Last administered on 4/24/20at 11:44;  Start 4/24/20 at 10:45;  Stop 

4/24/20 at 10:46;  Status DC


Ondansetron HCl (Zofran) 4 mg PRN Q6HRS  PRN IV NAUSEA/VOMITING;  Start 4/27/20 

at 07:00;  Stop 4/28/20 at 06:59;  Status DC


Fentanyl Citrate (Fentanyl 2ml Vial) 25 mcg PRN Q5MIN  PRN IV MILD PAIN 1-3;  

Start 4/27/20 at 07:00;  Stop 4/28/20 at 06:59;  Status DC


Fentanyl Citrate (Fentanyl 2ml Vial) 50 mcg PRN Q5MIN  PRN IV MODERATE TO SEVERE

PAIN Last administered on 4/27/20at 10:17;  Start 4/27/20 at 07:00;  Stop 

4/28/20 at 06:59;  Status DC


Ringer's Solution 1,000 ml @  30 mls/hr Q24H IV ;  Start 4/27/20 at 07:00;  Stop

4/27/20 at 18:59;  Status DC


Lidocaine HCl (Xylocaine-Mpf 1% 2ml Vial) 2 ml PRN 1X  PRN ID PRIOR TO IV START;

 Start 4/27/20 at 07:00;  Stop 4/28/20 at 06:59;  Status DC


Prochlorperazine Edisylate (Compazine) 5 mg PACU PRN  PRN IV NAUSEA, MRX1;  

Start 4/27/20 at 07:00;  Stop 4/28/20 at 06:59;  Status DC


Sodium Acetate 50 meq/Potassium Acetate 55 meq/ Magnesium Sulfate 20 meq/Calcium

Gluconate 10 meq/ Multivitamins 10 ml/Chromium/ Copper/Manganese/ Seleni/Zn 0.5 

ml/ Insulin Human Regular 35 unit/ Total Parenteral Nutrition/Amino 

Acids/Dextrose/ Fat Emulsion Intravenous 1,400 ml @  58.333 mls/ hr TPN  CONT IV

;  Start 4/24/20 at 22:00;  Stop 4/24/20 at 14:15;  Status DC


Sodium Acetate 50 meq/Potassium Acetate 55 meq/ Magnesium Sulfate 20 meq/Calcium

Gluconate 10 meq/ Multivitamins 10 ml/Chromium/ Copper/Manganese/ Seleni/Zn 0.5 

ml/ Insulin Human Regular 35 unit/ Total Parenteral Nutrition/Amino 

Acids/Dextrose/ Fat Emulsion Intravenous 1,800 ml @  75 mls/hr TPN  CONT IV  

Last administered on 4/24/20at 22:38;  Start 4/24/20 at 22:00;  Stop 4/25/20 at 

21:59;  Status DC


Sodium Chloride 1,000 ml @  1,000 mls/hr Q1H PRN IV hypotension;  Start 4/24/20 

at 15:31;  Stop 4/24/20 at 21:30;  Status DC


Diphenhydramine HCl (Benadryl) 25 mg 1X PRN  PRN IV ITCHING;  Start 4/24/20 at 

15:45;  Stop 4/25/20 at 15:44;  Status DC


Diphenhydramine HCl (Benadryl) 25 mg 1X PRN  PRN IV ITCHING;  Start 4/24/20 at 

15:45;  Stop 4/25/20 at 15:44;  Status DC


Sodium Chloride 1,000 ml @  400 mls/hr Q2H30M PRN IV PATENCY;  Start 4/24/20 at 

15:31;  Stop 4/25/20 at 03:30;  Status DC


Info (PHARMACY MONITORING -- do not chart) 1 each PRN DAILY  PRN MC SEE 

COMMENTS;  Start 4/24/20 at 15:45


Sodium Acetate 50 meq/Potassium Acetate 55 meq/ Magnesium Sulfate 20 meq/Calcium

Gluconate 10 meq/ Multivitamins 10 ml/Chromium/ Copper/Manganese/ Seleni/Zn 0.5 

ml/ Insulin Human Regular 35 unit/ Total Parenteral Nutrition/Amino 

Acids/Dextrose/ Fat Emulsion Intravenous 1,800 ml @  75 mls/hr TPN  CONT IV  

Last administered on 4/25/20at 22:03;  Start 4/25/20 at 22:00;  Stop 4/26/20 at 

21:59;  Status DC


Daptomycin 430 mg/ Sodium Chloride 50 ml @  100 mls/hr Q24H IV  Last 

administered on 4/30/20at 13:00;  Start 4/25/20 at 13:00;  Stop 4/30/20 at 

20:58;  Status DC


Heparin Sodium (Porcine) 1000 unit/Sodium Chloride 1,001 ml @  1,001 mls/hr 1X  

ONCE IRR ;  Start 4/27/20 at 06:00;  Stop 4/27/20 at 06:59;  Status DC


Potassium Acetate 55 meq/Magnesium Sulfate 20 meq/ Calcium Gluconate 10 meq/ 

Multivitamins 10 ml/Chromium/ Copper/Manganese/ Seleni/Zn 0.5 ml/ Insulin Human 

Regular 35 unit/ Total Parenteral Nutrition/Amino Acids/Dextrose/ Fat Emulsion 

Intravenous 1,920 ml @  80 mls/hr TPN  CONT IV  Last administered on 4/26/20at 

22:10;  Start 4/26/20 at 22:00;  Stop 4/27/20 at 21:59;  Status DC


Dexamethasone Sodium Phosphate (Decadron) 4 mg STK-MED ONCE .ROUTE ;  Start 

4/27/20 at 10:56;  Stop 4/27/20 at 10:57;  Status DC


Ondansetron HCl (Zofran) 4 mg STK-MED ONCE .ROUTE ;  Start 4/27/20 at 10:56;  

Stop 4/27/20 at 10:57;  Status DC


Rocuronium Bromide (Zemuron) 50 mg STK-MED ONCE .ROUTE ;  Start 4/27/20 at 

10:56;  Stop 4/27/20 at 10:57;  Status DC


Fentanyl Citrate (Fentanyl 2ml Vial) 100 mcg STK-MED ONCE .ROUTE ;  Start 

4/27/20 at 10:56;  Stop 4/27/20 at 10:57;  Status DC


Bupivacaine HCl/ Epinephrine Bitart (Sensorcain-Epi 0.5%-1:[id number] Mpf) 30 ml 

STK-MED ONCE .ROUTE  Last administered on 4/27/20at 12:01;  Start 4/27/20 at 

10:58;  Stop 4/27/20 at 10:58;  Status DC


Cellulose (Surgicel Hemostat 2x14) 1 each STK-MED ONCE .ROUTE ;  Start 4/27/20 a

t 10:58;  Stop 4/27/20 at 10:59;  Status DC


Iohexol (Omnipaque 300 Mg/ml) 50 ml STK-MED ONCE .ROUTE ;  Start 4/27/20 at 

10:58;  Stop 4/27/20 at 10:59;  Status DC


Cellulose (Surgicel Hemostat 4x8) 1 each STK-MED ONCE .ROUTE ;  Start 4/27/20 at

10:58;  Stop 4/27/20 at 10:59;  Status DC


Bisacodyl (Dulcolax Supp) 10 mg STK-MED ONCE .ROUTE ;  Start 4/27/20 at 10:59;  

Stop 4/27/20 at 10:59;  Status DC


Heparin Sodium (Porcine) 1000 unit/Sodium Chloride 1,001 ml @  1,001 mls/hr 1X  

ONCE IRR ;  Start 4/27/20 at 12:00;  Stop 4/27/20 at 12:59;  Status DC


Propofol 20 ml @ As Directed STK-MED ONCE IV ;  Start 4/27/20 at 11:05;  Stop 

4/27/20 at 11:05;  Status DC


Sevoflurane (Ultane) 90 ml STK-MED ONCE IH ;  Start 4/27/20 at 11:05;  Stop 

4/27/20 at 11:05;  Status DC


Sevoflurane (Ultane) 60 ml STK-MED ONCE IH ;  Start 4/27/20 at 12:26;  Stop 

4/27/20 at 12:27;  Status DC


Propofol 20 ml @ As Directed STK-MED ONCE IV ;  Start 4/27/20 at 12:26;  Stop 

4/27/20 at 12:27;  Status DC


Phenylephrine HCl (PHENYLEPHRINE in 0.9% NACL PF) 1 mg STK-MED ONCE IV ;  Start 

4/27/20 at 12:34;  Stop 4/27/20 at 12:34;  Status DC


Heparin Sodium (Porcine) (Heparin Sodium) 5,000 unit Q12HR SQ  Last administered

on 5/6/20at 20:57;  Start 4/27/20 at 21:00;  Stop 5/7/20 at 09:59;  Status DC


Sodium Chloride (Normal Saline Flush) 3 ml QSHIFT  PRN IV AFTER MEDS AND BLOOD 

DRAWS;  Start 4/27/20 at 13:45


Naloxone HCl (Narcan) 0.4 mg PRN Q2MIN  PRN IV SEE INSTRUCTIONS;  Start 4/27/20 

at 13:45


Sodium Chloride 1,000 ml @  25 mls/hr Q24H IV  Last administered on 5/19/20at 

13:37;  Start 4/27/20 at 13:37


Naloxone HCl (Narcan) 0.4 mg PRN Q2MIN  PRN IV SEE INSTRUCTIONS;  Start 4/27/20 

at 14:30;  Status UNV


Sodium Chloride 1,000 ml @  25 mls/hr Q24H IV ;  Start 4/27/20 at 14:30;  Status

UNV


Hydromorphone HCl 30 ml @ 0 mls/hr CONT PRN  PRN IV PER PROTOCOL Last 

administered on 5/2/20at 16:08;  Start 4/27/20 at 14:30;  Stop 5/4/20 at 08:55; 

Status DC


Potassium Acetate 55 meq/Magnesium Sulfate 20 meq/ Calcium Gluconate 10 meq/ 

Multivitamins 10 ml/Chromium/ Copper/Manganese/ Seleni/Zn 0.5 ml/ Insulin Human 

Regular 35 unit/ Total Parenteral Nutrition/Amino Acids/Dextrose/ Fat Emulsion 

Intravenous 1,920 ml @  80 mls/hr TPN  CONT IV  Last administered on 4/27/20at 

22:01;  Start 4/27/20 at 22:00;  Stop 4/28/20 at 21:59;  Status DC


Bumetanide (Bumex) 2 mg [id number] IV  Last administered on 5/1/20at 13:50;  Start 

4/28/20 at 14:00;  Stop 5/2/20 at 14:10;  Status DC


Meropenem 1 gm/ Sodium Chloride 100 ml @  200 mls/hr Q8HRS IV  Last administered

on 5/22/20at 05:53;  Start 4/28/20 at 14:00;  Stop 5/22/20 at 09:31;  Status DC


Potassium Acetate 55 meq/Magnesium Sulfate 20 meq/ Calcium Gluconate 10 meq/ 

Multivitamins 10 ml/Chromium/ Copper/Manganese/ Seleni/Zn 0.5 ml/ Insulin Human 

Regular 35 unit/ Total Parenteral Nutrition/Amino Acids/Dextrose/ Fat Emulsion 

Intravenous 1,920 ml @  80 mls/hr TPN  CONT IV  Last administered on 4/28/20at 

22:02;  Start 4/28/20 at 22:00;  Stop 4/29/20 at 21:59;  Status DC


Hydromorphone HCl (Dilaudid Standard PCA) 12 mg STK-MED ONCE IV ;  Start 4/27/20

at 14:35;  Stop 4/28/20 at 13:53;  Status DC


Artificial Tears (Artificial Tears) 1 drop PRN Q15MIN  PRN OU DRY EYE Last 

administered on 5/18/20at 08:08;  Start 4/29/20 at 05:30


Hydromorphone HCl (Dilaudid Standard PCA) 12 mg STK-MED ONCE IV ;  Start 4/28/20

at 12:05;  Stop 4/29/20 at 09:15;  Status DC


Potassium Acetate 65 meq/Magnesium Sulfate 20 meq/ Calcium Gluconate 10 meq/ 

Multivitamins 10 ml/Chromium/ Copper/Manganese/ Seleni/Zn 0.5 ml/ Insulin Human 

Regular 30 unit/ Total Parenteral Nutrition/Amino Acids/Dextrose/ Fat Emulsion 

Intravenous 1,920 ml @  80 mls/hr TPN  CONT IV  Last administered on 4/29/20at 

22:22;  Start 4/29/20 at 22:00;  Stop 4/30/20 at 21:59;  Status DC


Cyclobenzaprine HCl (Flexeril) 10 mg PRN Q6HRS  PRN PO MUSCLE SPASMS;  Start 

4/30/20 at 10:45


Potassium Acetate 55 meq/Magnesium Sulfate 20 meq/ Calcium Gluconate 10 meq/ 

Multivitamins 10 ml/Chromium/ Copper/Manganese/ Seleni/Zn 0.5 ml/ Insulin Human 

Regular 30 unit/ Total Parenteral Nutrition/Amino Acids/Dextrose/ Fat Emulsion 

Intravenous 1,920 ml @  80 mls/hr TPN  CONT IV  Last administered on 5/1/20at 

01:00;  Start 4/30/20 at 22:00;  Stop 5/1/20 at 21:59;  Status DC


Magnesium Sulfate 50 ml @ 25 mls/hr 1X  ONCE IV  Last administered on 4/30/20at 

17:18;  Start 4/30/20 at 12:45;  Stop 4/30/20 at 14:44;  Status DC


Potassium Chloride/Water 100 ml @  100 mls/hr 1X  ONCE IV  Last administered on 

5/1/20at 11:27;  Start 5/1/20 at 12:00;  Stop 5/1/20 at 12:59;  Status DC


Hydromorphone HCl (Dilaudid Standard PCA) 12 mg STK-MED ONCE IV ;  Start 4/29/20

at 10:50;  Stop 5/1/20 at 11:02;  Status DC


Hydromorphone HCl (Dilaudid Standard PCA) 12 mg STK-MED ONCE IV ;  Start 4/30/20

at 13:47;  Stop 5/1/20 at 11:03;  Status DC


Potassium Acetate 30 meq/Magnesium Sulfate 20 meq/ Calcium Gluconate 10 meq/ 

Multivitamins 10 ml/Chromium/ Copper/Manganese/ Seleni/Zn 0.5 ml/ Insulin Human 

Regular 30 unit/ Potassium Chloride 30 meq/ Total Parenteral Nutrition/Amino 

Acids/Dextrose/ Fat Emulsion Intravenous 1,920 ml @  80 mls/hr TPN  CONT IV  

Last administered on 5/1/20at 22:34;  Start 5/1/20 at 22:00;  Stop 5/2/20 at 

21:59;  Status DC


Potassium Chloride/Water 100 ml @  100 mls/hr Q1H IV  Last administered on 

5/2/20at 13:05;  Start 5/2/20 at 07:00;  Stop 5/2/20 at 10:59;  Status DC


Magnesium Sulfate 50 ml @ 25 mls/hr 1X  ONCE IV  Last administered on 5/2/20at 

10:34;  Start 5/2/20 at 10:30;  Stop 5/2/20 at 12:29;  Status DC


Potassium Chloride 75 meq/ Magnesium Sulfate 20 meq/Calcium Gluconate 10 meq/ 

Multivitamins 10 ml/Chromium/ Copper/Manganese/ Seleni/Zn 0.5 ml/ Insulin Human 

Regular 30 unit/ Total Parenteral Nutrition/Amino Acids/Dextrose/ Fat Emulsion 

Intravenous 1,920 ml @  80 mls/hr TPN  CONT IV  Last administered on 5/2/20at 

21:51;  Start 5/2/20 at 22:00;  Stop 5/3/20 at 22:00;  Status DC


Potassium Chloride 75 meq/ Magnesium Sulfate 20 meq/Calcium Gluconate 10 meq/ 

Multivitamins 10 ml/Chromium/ Copper/Manganese/ Seleni/Zn 0.5 ml/ Insulin Human 

Regular 25 unit/ Total Parenteral Nutrition/Amino Acids/Dextrose/ Fat Emulsion 

Intravenous 1,920 ml @  80 mls/hr TPN  CONT IV  Last administered on 5/3/20at 

22:04;  Start 5/3/20 at 22:00;  Stop 5/4/20 at 21:59;  Status DC


Hydromorphone HCl (Dilaudid) 0.4 mg PRN Q4HRS  PRN IVP PAIN Last administered on

5/4/20at 10:57;  Start 5/4/20 at 09:00;  Stop 5/4/20 at 18:59;  Status DC


Micafungin Sodium 100 mg/Dextrose 100 ml @  100 mls/hr Q24H IV  Last 

administered on 5/21/20at 10:53;  Start 5/4/20 at 11:00


Daptomycin 485 mg/ Sodium Chloride 50 ml @  100 mls/hr Q24H IV  Last 

administered on 5/11/20at 13:10;  Start 5/4/20 at 11:00;  Stop 5/12/20 at 07:44;

 Status DC


Potassium Chloride 75 meq/ Magnesium Sulfate 15 meq/Calcium Gluconate 8 meq/ 

Multivitamins 10 ml/Chromium/ Copper/Manganese/ Seleni/Zn 0.5 ml/ Insulin Human 

Regular 25 unit/ Total Parenteral Nutrition/Amino Acids/Dextrose/ Fat Emulsion 

Intravenous 1,920 ml @  80 mls/hr TPN  CONT IV  Last administered on 5/4/20at 

23:08;  Start 5/4/20 at 22:00;  Stop 5/5/20 at 21:59;  Status DC


Haloperidol Lactate (Haldol Inj) 3 mg 1X  ONCE IVP  Last administered on 

5/4/20at 14:37;  Start 5/4/20 at 14:30;  Stop 5/4/20 at 14:31;  Status DC


Hydromorphone HCl (Dilaudid) 1 mg PRN Q4HRS  PRN IVP PAIN Last administered on 

5/18/20at 06:25;  Start 5/4/20 at 19:00;  Stop 5/18/20 at 17:10;  Status DC


Potassium Chloride 75 meq/ Magnesium Sulfate 15 meq/Calcium Gluconate 8 meq/ 

Multivitamins 10 ml/Chromium/ Copper/Manganese/ Seleni/Zn 0.5 ml/ Insulin Human 

Regular 20 unit/ Total Parenteral Nutrition/Amino Acids/Dextrose/ Fat Emulsion 

Intravenous 1,920 ml @  80 mls/hr TPN  CONT IV  Last administered on 5/5/20at 

22:10;  Start 5/5/20 at 22:00;  Stop 5/6/20 at 21:59;  Status DC


Lidocaine HCl (Buffered Lidocaine 1%) 3 ml STK-MED ONCE .ROUTE ;  Start 5/6/20 a

t 11:31;  Stop 5/6/20 at 11:31;  Status DC


Lidocaine HCl (Buffered Lidocaine 1%) 3 ml STK-MED ONCE .ROUTE ;  Start 5/6/20 

at 12:28;  Stop 5/6/20 at 12:29;  Status DC


Lidocaine HCl (Buffered Lidocaine 1%) 6 ml 1X  ONCE INJ  Last administered on 

5/6/20at 12:53;  Start 5/6/20 at 12:45;  Stop 5/6/20 at 12:46;  Status DC


Potassium Chloride 75 meq/ Magnesium Sulfate 15 meq/Calcium Gluconate 8 meq/ 

Multivitamins 10 ml/Chromium/ Copper/Manganese/ Seleni/Zn 0.5 ml/ Insulin Human 

Regular 20 unit/ Total Parenteral Nutrition/Amino Acids/Dextrose/ Fat Emulsion 

Intravenous 1,920 ml @  80 mls/hr TPN  CONT IV  Last administered on 5/6/20at 

22:00;  Start 5/6/20 at 22:00;  Stop 5/7/20 at 21:59;  Status DC


Potassium Chloride 75 meq/ Magnesium Sulfate 15 meq/Calcium Gluconate 8 meq/ 

Multivitamins 10 ml/Chromium/ Copper/Manganese/ Seleni/Zn 0.5 ml/ Insulin Human 

Regular 15 unit/ Total Parenteral Nutrition/Amino Acids/Dextrose/ Fat Emulsion 

Intravenous 1,920 ml @  80 mls/hr TPN  CONT IV  Last administered on 5/7/20at 

22:28;  Start 5/7/20 at 22:00;  Stop 5/8/20 at 21:59;  Status DC


Vecuronium Bromide (Norcuron Bolus) 6 mg PRN Q6HRS  PRN IV VENT ASYNCHRONY;  

Start 5/7/20 at 19:15;  Stop 5/7/20 at 19:35;  Status DC


Bumetanide (Bumex) 2 mg 1X  ONCE IV  Last administered on 5/7/20at 22:09;  Start

5/7/20 at 19:45;  Stop 5/7/20 at 19:46;  Status DC


Lidocaine HCl (Buffered Lidocaine 1%) 3 ml STK-MED ONCE .ROUTE ;  Start 5/8/20 

at 07:59;  Stop 5/8/20 at 07:59;  Status DC


Midazolam HCl (Versed) 5 mg STK-MED ONCE .ROUTE ;  Start 5/8/20 at 08:36;  Stop 

5/8/20 at 08:36;  Status DC


Fentanyl Citrate (Fentanyl 5ml Vial) 250 mcg STK-MED ONCE .ROUTE ;  Start 5/8/20

at 08:36;  Stop 5/8/20 at 08:37;  Status DC


Lidocaine HCl (Buffered Lidocaine 1%) 3 ml 1X  ONCE IJ  Last administered on 

5/8/20at 09:30;  Start 5/8/20 at 09:15;  Stop 5/8/20 at 09:16;  Status DC


Midazolam HCl (Versed) 5 mg 1X  ONCE IV  Last administered on 5/8/20at 09:30;  

Start 5/8/20 at 09:15;  Stop 5/8/20 at 09:16;  Status DC


Fentanyl Citrate (Fentanyl 5ml Vial) 250 mcg 1X  ONCE IV  Last administered on 

5/8/20at 09:30;  Start 5/8/20 at 09:15;  Stop 5/8/20 at 09:16;  Status DC


Bumetanide (Bumex) 2 mg DAILY IV  Last administered on 5/18/20at 08:07;  Start 

5/8/20 at 10:00;  Stop 5/18/20 at 17:15;  Status DC


Potassium Chloride 75 meq/ Magnesium Sulfate 15 meq/ Multivitamins 10 

ml/Chromium/ Copper/Manganese/ Seleni/Zn 0.5 ml/ Insulin Human Regular 15 unit/ 

Total Parenteral Nutrition/Amino Acids/Dextrose/ Fat Emulsion Intravenous 1,920 

ml @  80 mls/hr TPN  CONT IV  Last administered on 5/8/20at 21:59;  Start 5/8/20

at 22:00;  Stop 5/9/20 at 21:59;  Status DC


Metoclopramide HCl (Reglan Vial) 10 mg PRN Q3HRS  PRN IVP NAUSEA/VOMITING-3rd 

choice Last administered on 5/14/20at 04:25;  Start 5/9/20 at 16:45


Potassium Chloride 75 meq/ Magnesium Sulfate 15 meq/ Multivitamins 10 

ml/Chromium/ Copper/Manganese/ Seleni/Zn 0.5 ml/ Insulin Human Regular 15 unit/ 

Total Parenteral Nutrition/Amino Acids/Dextrose/ Fat Emulsion Intravenous 1,920 

ml @  80 mls/hr TPN  CONT IV  Last administered on 5/9/20at 22:41;  Start 5/9/20

at 22:00;  Stop 5/10/20 at 21:59;  Status DC


Magnesium Sulfate 50 ml @ 25 mls/hr 1X  ONCE IV  Last administered on 5/10/20at 

10:44;  Start 5/10/20 at 09:00;  Stop 5/10/20 at 10:59;  Status DC


Potassium Chloride/Water 100 ml @  100 mls/hr 1X  ONCE IV  Last administered on 

5/10/20at 09:37;  Start 5/10/20 at 09:00;  Stop 5/10/20 at 09:59;  Status DC


Duloxetine HCl (Cymbalta) 30 mg DAILY PO  Last administered on 5/11/20at 09:48; 

Start 5/10/20 at 14:00;  Stop 5/13/20 at 10:25;  Status DC


Potassium Chloride 80 meq/ Magnesium Sulfate 20 meq/ Multivitamins 10 

ml/Chromium/ Copper/Manganese/ Seleni/Zn 0.5 ml/ Insulin Human Regular 15 unit/ 

Total Parenteral Nutrition/Amino Acids/Dextrose/ Fat Emulsion Intravenous 1,920 

ml @  80 mls/hr TPN  CONT IV  Last administered on 5/10/20at 21:42;  Start 

5/10/20 at 22:00;  Stop 5/11/20 at 21:59;  Status DC


Potassium Chloride 80 meq/ Magnesium Sulfate 20 meq/ Multivitamins 10 

ml/Chromium/ Copper/Manganese/ Seleni/Zn 0.5 ml/ Insulin Human Regular 15 unit/ 

Total Parenteral Nutrition/Amino Acids/Dextrose/ Fat Emulsion Intravenous 1,920 

ml @  80 mls/hr TPN  CONT IV  Last administered on 5/11/20at 22:20;  Start 

5/11/20 at 22:00;  Stop 5/12/20 at 21:59;  Status DC


Lidocaine HCl (Buffered Lidocaine 1%) 3 ml STK-MED ONCE .ROUTE ;  Start 5/12/20 

at 09:54;  Stop 5/12/20 at 09:55;  Status DC


Hydromorphone HCl (Dilaudid Standard PCA) 12 mg STK-MED ONCE IV ;  Start 5/1/20 

at 15:50;  Stop 5/12/20 at 11:24;  Status DC


Potassium Chloride 80 meq/ Magnesium Sulfate 20 meq/ Multivitamins 10 ml

/Chromium/ Copper/Manganese/ Seleni/Zn 0.5 ml/ Insulin Human Regular 15 unit/ 

Total Parenteral Nutrition/Amino Acids/Dextrose/ Fat Emulsion Intravenous 1,920 

ml @  80 mls/hr TPN  CONT IV  Last administered on 5/12/20at 21:40;  Start 

5/12/20 at 22:00;  Stop 5/13/20 at 21:59;  Status DC


Lidocaine HCl (Buffered Lidocaine 1%) 6 ml 1X  ONCE INJ  Last administered on 

5/12/20at 14:15;  Start 5/12/20 at 14:15;  Stop 5/12/20 at 14:16;  Status DC


Potassium Chloride 80 meq/ Magnesium Sulfate 20 meq/ Multivitamins 10 m

l/Chromium/ Copper/Manganese/ Seleni/Zn 1 ml/ Insulin Human Regular 15 unit/ 

Total Parenteral Nutrition/Amino Acids/Dextrose/ Fat Emulsion Intravenous 1,920 

ml @  80 mls/hr TPN  CONT IV  Last administered on 5/13/20at 22:04;  Start 

5/13/20 at 22:00;  Stop 5/14/20 at 21:59;  Status DC


Potassium Chloride/Water 100 ml @  100 mls/hr 1X  ONCE IV  Last administered on 

5/14/20at 11:34;  Start 5/14/20 at 11:00;  Stop 5/14/20 at 11:59;  Status DC


Potassium Chloride 90 meq/ Magnesium Sulfate 20 meq/ Multivitamins 10 

ml/Chromium/ Copper/Manganese/ Seleni/Zn 1 ml/ Insulin Human Regular 15 unit/ 

Total Parenteral Nutrition/Amino Acids/Dextrose/ Fat Emulsion Intravenous 1,920 

ml @  80 mls/hr TPN  CONT IV  Last administered on 5/14/20at 22:57;  Start 

5/14/20 at 22:00;  Stop 5/15/20 at 21:59;  Status DC


Potassium Chloride 90 meq/ Magnesium Sulfate 20 meq/ Multivitamins 10 

ml/Chromium/ Copper/Manganese/ Seleni/Zn 1 ml/ Insulin Human Regular 15 unit/ 

Total Parenteral Nutrition/Amino Acids/Dextrose/ Fat Emulsion Intravenous 1,920 

ml @  80 mls/hr TPN  CONT IV  Last administered on 5/15/20at 22:48;  Start 

5/15/20 at 22:00;  Stop 5/16/20 at 21:59;  Status DC


Potassium Chloride 90 meq/ Magnesium Sulfate 20 meq/ Multivitamins 10 

ml/Chromium/ Copper/Manganese/ Seleni/Zn 1 ml/ Insulin Human Regular 15 unit/ 

Total Parenteral Nutrition/Amino Acids/Dextrose/ Fat Emulsion Intravenous 1,890 

ml @  78.75 mls/ hr TPN  CONT IV  Last administered on 5/16/20at 22:15;  Start 

5/16/20 at 22:00;  Stop 5/17/20 at 21:59;  Status DC


Linezolid/Dextrose 300 ml @  300 mls/hr Q12HR IV  Last administered on 5/19/20at

21:08;  Start 5/17/20 at 09:00;  Stop 5/20/20 at 08:11;  Status DC


Daptomycin 450 mg/ Sodium Chloride 50 ml @  100 mls/hr Q24H IV  Last 

administered on 5/20/20at 09:25;  Start 5/17/20 at 09:00;  Stop 5/21/20 at 

08:30;  Status DC


Potassium Chloride 90 meq/ Magnesium Sulfate 20 meq/ Multivitamins 10 

ml/Chromium/ Copper/Manganese/ Seleni/Zn 1 ml/ Insulin Human Regular 15 unit/ 

Total Parenteral Nutrition/Amino Acids/Dextrose/ Fat Emulsion Intravenous 1,890 

ml @  78.75 mls/ hr TPN  CONT IV  Last administered on 5/17/20at 21:34;  Start 

5/17/20 at 22:00;  Stop 5/18/20 at 21:59;  Status DC


Lorazepam (Ativan Inj) 2 mg STK-MED ONCE .ROUTE ;  Start 5/17/20 at 14:58;  Stop

5/17/20 at 14:58;  Status DC


Metoprolol Tartrate (Lopressor Vial) 5 mg 1X  ONCE IVP  Last administered on 

5/17/20at 15:31;  Start 5/17/20 at 15:15;  Stop 5/17/20 at 15:16;  Status DC


Lorazepam (Ativan Inj) 2 mg 1X  ONCE IVP  Last administered on 5/17/20at 15:30; 

Start 5/17/20 at 15:15;  Stop 5/17/20 at 15:16;  Status DC


Enoxaparin Sodium (Lovenox 40mg Syringe) 40 mg Q24H SQ  Last administered on 

5/21/20at 17:06;  Start 5/17/20 at 17:00


Lorazepam (Ativan Inj) 1 mg PRN Q4HRS  PRN IVP ANXIETY / AGITATION MILD-MOD;  

Start 5/17/20 at 19:15


Lorazepam (Ativan Inj) 2 mg PRN Q4HRS  PRN IVP ANXIETY / AGITATION SEVERE Last 

administered on 5/18/20at 22:20;  Start 5/17/20 at 19:15


Fentanyl Citrate (Fentanyl 2ml Vial) 50 mcg PRN Q4HRS  PRN IVP SEVERE PAIN Last 

administered on 5/20/20at 05:00;  Start 5/18/20 at 13:15


Fentanyl Citrate (Fentanyl 2ml Vial) 25 mcg PRN Q4HRS  PRN IVP MODERATE PAIN 

Last administered on 5/22/20at 10:01;  Start 5/18/20 at 13:15


Potassium Chloride 90 meq/ Magnesium Sulfate 20 meq/ Multivitamins 10 

ml/Chromium/ Copper/Manganese/ Seleni/Zn 1 ml/ Insulin Human Regular 15 unit/ 

Total Parenteral Nutrition/Amino Acids/Dextrose/ Fat Emulsion Intravenous 1,890 

ml @  78.75 mls/ hr TPN  CONT IV  Last administered on 5/18/20at 22:18;  Start 

5/18/20 at 22:00;  Stop 5/19/20 at 21:59;  Status DC


Furosemide (Lasix) 40 mg 1X  ONCE IVP  Last administered on 5/18/20at 21:51;  

Start 5/18/20 at 21:45;  Stop 5/18/20 at 21:48;  Status DC


Albumin Human 100 ml @  100 mls/hr 1X PRN  PRN IV SEE COMMENTS;  Start 5/19/20 

at 01:30


Furosemide (Lasix) 40 mg [id number] IVP  Last administered on 5/22/20at 08:31;  Start

5/19/20 at 14:00


Potassium Chloride 90 meq/ Magnesium Sulfate 20 meq/ Multivitamins 10 

ml/Chromium/ Copper/Manganese/ Seleni/Zn 1 ml/ Insulin Human Regular 15 unit/ 

Total Parenteral Nutrition/Amino Acids/Dextrose/ Fat Emulsion Intravenous 1,800 

ml @  75 mls/hr TPN  CONT IV  Last administered on 5/19/20at 22:31;  Start 

5/19/20 at 22:00;  Stop 5/20/20 at 21:59;  Status DC


Potassium Chloride 90 meq/ Magnesium Sulfate 20 meq/ Multivitamins 10 

ml/Chromium/ Copper/Manganese/ Seleni/Zn 1 ml/ Insulin Human Regular 15 unit/ 

Total Parenteral Nutrition/Amino Acids/Dextrose/ Fat Emulsion Intravenous 1,800 

ml @  75 mls/hr TPN  CONT IV  Last administered on 5/20/20at 22:28;  Start 

5/20/20 at 22:00;  Stop 5/21/20 at 21:59;  Status DC


Potassium Chloride 110 meq/ Magnesium Sulfate 20 meq/ Multivitamins 10 

ml/Chromium/ Copper/Manganese/ Seleni/Zn 1 ml/ Insulin Human Regular 15 unit/ 

Total Parenteral Nutrition/Amino Acids/Dextrose/ Fat Emulsion Intravenous 1,800 

ml @  75 mls/hr TPN  CONT IV  Last administered on 5/21/20at 22:01;  Start 

5/21/20 at 22:00;  Stop 5/22/20 at 21:59


Saliva Substitute (Biotene Moisturizing Mouth) 2 spray PRN Q15MIN  PRN PO DRY 

MOUTH;  Start 5/21/20 at 11:00





Active Scripts


Active


Reported


Bisoprolol Fumarate 5 Mg Tablet 10 Mg PO DAILY


Vitals/I & O





Vital Sign - Last 24 Hours








 5/21/20 5/21/20 5/21/20 5/21/20





 11:51 12:00 13:00 13:02


 


Temp  98.4  





  98.4  


 


Pulse  110 116 


 


Resp  33 41 40


 


B/P (MAP)  163/80 (107) 164/85 (111) 


 


Pulse Ox  96 96 94


 


O2 Delivery Trach Collar Tracheal Collar Tracheal Collar Tracheal Collar


 


O2 Flow Rate 8.0 8.0 8.0 8.0


 


    





    





 5/21/20 5/21/20 5/21/20 5/21/20





 13:38 14:00 15:00 16:00


 


Temp    98.7





    98.7


 


Pulse  94 110 89


 


Resp 36 18 36 24


 


B/P (MAP)  148/86 (106) 173/99 (123) 112/62 (79)


 


Pulse Ox 94 96 94 96


 


O2 Delivery Tracheal Collar Tracheal Collar Tracheal Collar Tracheal Collar


 


O2 Flow Rate 8.0 8.0 8.0 8.0


 


    





    





 5/21/20 5/21/20 5/21/20 5/21/20





 16:00 17:00 17:07 17:44


 


Pulse  88  


 


Resp  25 26 31


 


B/P (MAP)  128/81 (97)  


 


Pulse Ox  99 99 99


 


O2 Delivery Trach Collar Tracheal Collar Tracheal Collar Tracheal Collar


 


O2 Flow Rate 8.0 8.0 8.0 8.0





 5/21/20 5/21/20 5/21/20 5/21/20





 18:00 19:00 20:00 20:00


 


Temp    98.3





    98.3


 


Pulse 92 88  92


 


Resp 28 24  28


 


B/P (MAP) 140/68 (92) 119/68 (85)  118/75 (89)


 


Pulse Ox 99 99  95


 


O2 Delivery Tracheal Collar Tracheal Collar Trach Collar Tracheal Collar


 


O2 Flow Rate 8.0 8.0 8.0 8.0


 


    





    





 5/21/20 5/21/20 5/21/20 5/21/20





 21:00 21:15 21:47 22:00


 


Pulse 100   98


 


Resp 35  28 31


 


B/P (MAP) 157/73 (101)   141/73 (95)


 


Pulse Ox 99 100 100 100


 


O2 Delivery Tracheal Collar Tracheal Collar Tracheal Collar Tracheal Collar


 


O2 Flow Rate 8.0  8.0 8.0





 5/21/20 5/21/20 5/22/20 5/22/20





 22:17 23:00 00:00 00:00


 


Temp   98.6 





   98.6 


 


Pulse  94 84 


 


Resp 19 32 22 


 


B/P (MAP)  101/68 (79) 98/55 (69) 


 


Pulse Ox 100 100 100 


 


O2 Delivery Tracheal Collar Tracheal Collar Tracheal Collar Trach Collar


 


O2 Flow Rate 8.0 8.0 8.0 8.0


 


    





    





 5/22/20 5/22/20 5/22/20 5/22/20





 01:00 02:00 03:00 04:00


 


Temp    99.2





    99.2


 


Pulse 78 78 82 92


 


Resp 21 19 19 34


 


B/P (MAP) 106/53 (70) 112/75 (87) 114/63 (80) 136/74 (94)


 


Pulse Ox 100 100 100 97


 


O2 Delivery Tracheal Collar Tracheal Collar Tracheal Collar Tracheal Collar


 


O2 Flow Rate 8.0 8.0 8.0 8.0


 


    





    





 5/22/20 5/22/20 5/22/20 5/22/20





 04:00 05:00 05:59 06:00


 


Pulse  92  88


 


Resp  28 47 29


 


B/P (MAP)  118/59 (78)  102/60 (74)


 


Pulse Ox  99 99 99


 


O2 Delivery Trach Collar Tracheal Collar Tracheal Collar Tracheal Collar


 


O2 Flow Rate 8.0 8.0 8.0 8.0





 5/22/20 5/22/20 5/22/20 5/22/20





 07:00 07:48 07:50 08:00


 


Temp    99.2





    99.2


 


Pulse 86   86


 


Resp 28  24 21


 


B/P (MAP) 113/61 (78)   118/67 (84)


 


Pulse Ox 98 99 96 98


 


O2 Delivery Tracheal Collar Tracheal Collar Tracheal Collar Tracheal Collar


 


O2 Flow Rate 8.0  8.0 8.0


 


    





    





 5/22/20 5/22/20 5/22/20 5/22/20





 08:00 09:00 10:00 10:01


 


Pulse  98 101 


 


Resp  33 36 28


 


B/P (MAP)  140/72 (94) 146/66 (92) 


 


Pulse Ox  99 99 98


 


O2 Delivery Trach Collar Tracheal Collar Tracheal Collar Tracheal Collar


 


O2 Flow Rate 8.0 8.0 8.0 8.0





 5/22/20   





 10:41   


 


Resp 28   


 


Pulse Ox 98   


 


O2 Delivery Tracheal Collar   


 


O2 Flow Rate 8.0   














Intake and Output   


 


 5/21/20 5/21/20 5/22/20





 15:00 23:00 07:00


 


Intake Total 260 ml 1084.10 ml 0 ml


 


Output Total 1840 ml 915 ml 995 ml


 


Balance -1580 ml 169.10 ml -995 ml











Hemodynamically unstable?:  No


Is patient in severe pain?:  No


Is NPO status required?:  Yes











MG ARGUETA MD                 May 22, 2020 11:05

## 2020-05-22 NOTE — NUR
SS following up with discharge planning. SS reviewed pt chart and discussed with pt RN. Pt 
remains on trach collar with TPN, ROBERT drains x3, NG tube, and IV antibiotics. Pt showing 
improvement with PT/OT. SS will continue to follow for discharge planning.

## 2020-05-22 NOTE — NUR
Pharmacy TPN Dosing Note



S: JESENIA BEAN is a 49 year old F Currently receiving Central Continuous TPN started 
03/18/20



B:Pertinent PMH: Necrotizing pancreatitis

Height: 5 feet, 8 inches

Weight: 78.5 kg



Current diet: NPO 



LABS:

Sodium:    140 

Potassium: 4.3 

Chloride:  99 

Calcium:   9.3 

Corrected Calcium: 10.74 

Magnesium: 2.1 

CO2:       36 

SCr:       0.6 

Glucose:   126-164 

Albumin:   2.2 

AST:       50 

ALT:       50 



TPN FORMULA:

TPN TYPE:  Central Continuous

AMINO ACIDS:         70 gm

DEXTROSE:            250 gm

LIPIDS:              30 gm

POTASSIUM CHLORIDE:  110 mEq

MAGNESIUM:           20 mEq

INSULIN:             15 units

MULTIPLE VITAMIN:    10 ml

TRACE ELEMENTS:      1 ml(s)



TPN PLAN:  

Electrolytes stable. Cont same TPN formula.

-Labs on Monday.





R: Continue same TPN formula. 

Will monitor electrolytes, glucose, and tolerance to TPN.



 LORENZO LESLIE RPH, 05/22/20 [id number]

## 2020-05-22 NOTE — PDOC
Infectious Disease Note


Subjective:


Subjective


Patient comfortable, arousable,says feels okay


 off vent, trach o2





Vital Signs:


Vital Signs





Vital Signs








  Date Time  Temp Pulse Resp B/P (MAP) Pulse Ox O2 Delivery O2 Flow Rate FiO2


 


5/22/20 07:50   24  96 Tracheal Collar 8.0 


 


5/22/20 07:00  86  113/61 (78)    


 


5/22/20 04:00 99.2       





 99.2       











Physical Exam:


PHYSICAL EXAM


GENERAL: Alert, awake trying to talk but has a lot of secretions, remains 

confused


HEENT:  oral cavity dry, NGT


NECK:  Trach with speaking valve


LUNGS: no rhonchi


HEART:  S1, S2, regular 


ABDOMEN: mod Distended, hypoactive BS, tender, + drainsx1


: [place] (4/14)


EXTREMITIES: Generalized edema, improving, no cyanosis, SCDs bilaterally 


SKIN: Warm and dry.  No generalized rash.  


CNS: Very weak 


PICC(4/30) clean





Medications:


Inpatient Meds:





Current Medications








 Medications


  (Trade)  Dose


 Ordered  Sig/Yee  Start Time


 Stop Time Status Last Admin


Dose Admin


 


 Acetaminophen


  (Tylenol Supp)  650 mg  PRN Q6HRS  PRN  3/24/20 10:30


    5/5/20 09:12


650 MG


 


 Acetaminophen


  (Tylenol)  650 mg  PRN Q6HRS  PRN  3/21/20 03:36


 5/13/20 10:25 DC 4/16/20 19:56


650 MG


 


 Albumin Human  100 ml @ 


 100 mls/hr  1X PRN  PRN  5/19/20 01:30


     





 


 Albuterol Sulfate


  (Ventolin Neb


 Soln)  2.5 mg  1X  ONCE  3/17/20 22:30


 3/17/20 22:31 DC 3/18/20 00:56


2.5 MG


 


 Alteplase,


 Recombinant


  (Cathflo For


 Central Catheter


 Clearance)  1 mg  1X  ONCE  4/24/20 10:45


 4/24/20 10:46 DC 4/24/20 11:44


1 MG


 


 Amino Acids/


 Glycerin/


 Electrolytes  1,000 ml @ 


 75 mls/hr  [id number]  4/20/20 21:15


   UNV  





 


 Artificial Tears


  (Artificial


 Tears)  1 drop  PRN Q15MIN  PRN  4/29/20 05:30


    5/18/20 08:08


1 DROP


 


 Atenolol


  (Tenormin)  100 mg  DAILY  3/17/20 09:00


 3/16/20 20:08 DC  





 


 Atropine Sulfate


  (ATROPINE 0.5mg


 SYRINGE)  0.5 mg  PRN Q5MIN  PRN  4/2/20 08:15


     





 


 Benzocaine


  (Hurricaine One)  1 spray  1X  ONCE  3/20/20 14:30


 3/20/20 14:31 DC 3/20/20 16:38


1 SPRAY


 


 Bisacodyl


  (Dulcolax Supp)  10 mg  STK-MED ONCE  4/27/20 10:59


 4/27/20 10:59 DC  





 


 Bumetanide


  (Bumex)  2 mg  DAILY  5/8/20 10:00


 5/18/20 17:15 DC 5/18/20 08:07


2 MG


 


 Bupivacaine HCl/


 Epinephrine Bitart


  (Sensorcain-Epi


 0.5%-1:247319 Mpf)  30 ml  STK-MED ONCE  4/27/20 10:58


 4/27/20 10:58 DC 4/27/20 12:01


7 ML


 


 Calcium Carbonate/


 Glycine


  (Tums)  500 mg  PRN [id number]  PRN  3/18/20 17:45


 5/13/20 10:25 DC  





 


 Calcium Chloride


 1000 mg/Sodium


 Chloride  110 ml @ 


 220 mls/hr  1X  ONCE  3/17/20 22:30


 3/17/20 22:59 DC 3/17/20 22:11


220 MLS/HR


 


 Calcium Chloride


 3000 mg/Sodium


 Chloride  1,030 ml @ 


 50 mls/hr  [id number]  3/19/20 08:00


 3/21/20 15:23 DC 3/21/20 02:17


50 MLS/HR


 


 Calcium Gluconate


  (Calcium


 Gluconate)  2,000 mg  1X  ONCE  3/19/20 02:15


 3/19/20 02:16 DC 3/19/20 02:19


2,000 MG


 


 Calcium Gluconate


 1000 mg/Sodium


 Chloride  110 ml @ 


 220 mls/hr  1X  ONCE  3/18/20 03:30


 3/18/20 03:59 DC 3/18/20 03:21


220 MLS/HR


 


 Calcium Gluconate


 2000 mg/Sodium


 Chloride  120 ml @ 


 220 mls/hr  1X  ONCE  3/18/20 07:30


 3/18/20 08:02 DC 3/18/20 09:05


220 MLS/HR


 


 Cefepime HCl


  (Maxipime)  2 gm  Q12HR  3/25/20 09:00


 4/8/20 09:58 DC 4/7/20 20:56


2 GM


 


 Cellulose


  (Surgicel


 Fibrillar 1x2)  1 each  STK-MED ONCE  4/6/20 11:00


 4/6/20 11:01 DC  





 


 Cellulose


  (Surgicel


 Hemostat 2x14)  1 each  STK-MED ONCE  4/27/20 10:58


 4/27/20 10:59 DC  





 


 Cellulose


  (Surgicel


 Hemostat 4x8)  1 each  STK-MED ONCE  4/27/20 10:58


 4/27/20 10:59 DC  





 


 Cyclobenzaprine


 HCl


  (Flexeril)  10 mg  PRN Q6HRS  PRN  4/30/20 10:45


     





 


 Daptomycin 430 mg/


 Sodium Chloride  50 ml @ 


 100 mls/hr  Q24H  4/25/20 13:00


 4/30/20 20:58 DC 4/30/20 13:00


100 MLS/HR


 


 Daptomycin 450 mg/


 Sodium Chloride  50 ml @ 


 100 mls/hr  Q24H  5/17/20 09:00


 5/21/20 08:30 DC 5/20/20 09:25


100 MLS/HR


 


 Daptomycin 485 mg/


 Sodium Chloride  50 ml @ 


 100 mls/hr  Q24H  5/4/20 11:00


 5/12/20 07:44 DC 5/11/20 13:10


100 MLS/HR


 


 Daptomycin 500 mg/


 Sodium Chloride  50 ml @ 


 100 mls/hr  Q48H  3/25/20 08:30


 4/10/20 10:07 DC 4/10/20 09:57


100 MLS/HR


 


 Dexamethasone


 Sodium Phosphate


  (Decadron)  4 mg  STK-MED ONCE  4/27/20 10:56


 4/27/20 10:57 DC  





 


 Dexmedetomidine


 HCl 400 mcg/


 Sodium Chloride  100 ml @ 0


 mls/hr  CONT  PRN  4/2/20 08:15


    5/22/20 00:23


13.4 MLS/HR


 


 Dextrose


  (Dextrose


 50%-Water Syringe)  12.5 gm  PRN Q15MIN  PRN  3/16/20 09:30


     





 


 Digoxin


  (Lanoxin)  125 mcg  1X  ONCE  3/19/20 18:00


 3/19/20 18:01 DC 3/19/20 17:10


125 MCG


 


 Diphenhydramine


 HCl


  (Benadryl)  25 mg  1X PRN  PRN  4/24/20 15:45


 4/25/20 15:44 DC  





 


 Duloxetine HCl


  (Cymbalta)  30 mg  DAILY  5/10/20 14:00


 5/13/20 10:25 DC 5/11/20 09:48


30 MG


 


 Enoxaparin Sodium


  (Lovenox 100mg


 Syringe)  100 mg  Q12HR  4/21/20 21:00


   UNV  





 


 Enoxaparin Sodium


  (Lovenox 40mg


 Syringe)  40 mg  Q24H  5/17/20 17:00


    5/21/20 17:06


40 MG


 


 Etomidate


  (Amidate)  8 mg  1X  ONCE  3/23/20 08:30


 3/23/20 08:31 DC 3/23/20 08:33


8 MG


 


 Fentanyl Citrate


  (Fentanyl 2ml


 Vial)  25 mcg  PRN Q4HRS  PRN  5/18/20 13:15


    5/22/20 05:59


25 MCG


 


 Fentanyl Citrate


  (Fentanyl 5ml


 Vial)  250 mcg  1X  ONCE  5/8/20 09:15


 5/8/20 09:16 DC 5/8/20 09:30


50 MCG


 


 Furosemide


  (Lasix)  40 mg  [id number]  5/19/20 14:00


    5/21/20 14:16


40 MG


 


 Haloperidol


 Lactate


  (Haldol Inj)  3 mg  1X  ONCE  5/4/20 14:30


 5/4/20 14:31 DC 5/4/20 14:37


3 MG


 


 Heparin Sodium


  (Porcine)


  (Hep Lock Adult)  500 unit  STK-MED ONCE  4/7/20 09:29


 4/7/20 09:30 DC  





 


 Heparin Sodium


  (Porcine)


  (Heparin Sodium)  5,000 unit  Q12HR  4/27/20 21:00


 5/7/20 09:59 DC 5/6/20 20:57


5,000 UNIT


 


 Heparin Sodium


  (Porcine) 1000


 unit/Sodium


 Chloride  1,001 ml @ 


 1,001 mls/hr  1X  ONCE  4/27/20 12:00


 4/27/20 12:59 DC  





 


 Hydromorphone HCl


  (Dilaudid


 Standard PCA)  12 mg  STK-MED ONCE  5/1/20 15:50


 5/12/20 11:24 DC  





 


 Hydromorphone HCl


  (Dilaudid)  1 mg  PRN Q4HRS  PRN  5/4/20 19:00


 5/18/20 17:10 DC 5/18/20 06:25


1 MG


 


 Info


  (CONTRAST GIVEN


 -- Rx MONITORING)  1 each  PRN DAILY  PRN  3/30/20 11:45


 4/1/20 11:44 DC  





 


 Info


  (Icu Electrolyte


 Protocol)  1 ea  CONT PRN  PRN  3/29/20 13:15


     





 


 Info


  (PHARMACY


 MONITORING -- do


 not chart)  1 each  PRN DAILY  PRN  4/24/20 15:45


     





 


 Info


  (Tpn Per


 Pharmacy)  1 each  PRN DAILY  PRN  3/18/20 12:30


   UNV  





 


 Insulin Human


 Lispro


  (HumaLOG)  0-9 UNITS  Q6HRS  3/16/20 09:30


    5/22/20 06:06


4 UNITS


 


 Insulin Human


 Regular


  (HumuLIN R VIAL)  5 unit  1X  ONCE  3/17/20 22:30


 3/17/20 22:31 DC 3/17/20 22:14


5 UNIT


 


 Iohexol


  (Omnipaque 240


 Mg/ml)  30 ml  1X  ONCE  3/30/20 11:30


 3/30/20 11:33 DC 3/30/20 11:30


30 ML


 


 Iohexol


  (Omnipaque 300


 Mg/ml)  50 ml  STK-MED ONCE  4/27/20 10:58


 4/27/20 10:59 DC  





 


 Iohexol


  (Omnipaque 350


 Mg/ml)  90 ml  1X  ONCE  3/16/20 03:30


 3/16/20 03:31 DC 3/16/20 03:25


90 ML


 


 Ketorolac


 Tromethamine


  (Toradol 30mg


 Vial)  30 mg  1X  ONCE  3/16/20 03:00


 3/16/20 03:01 DC 3/16/20 02:54


30 MG


 


 Lidocaine HCl


  (Buffered


 Lidocaine 1%)  6 ml  1X  ONCE  5/12/20 14:15


 5/12/20 14:16 DC 5/12/20 14:15


3 ML


 


 Lidocaine HCl


  (Glydo


  (Lidocaine) Jelly)  1 thomas  1X  ONCE  3/20/20 14:30


 3/20/20 14:31 DC 3/20/20 16:38


1 THOMAS


 


 Lidocaine HCl


  (Xylocaine-Mpf


 1% 2ml Vial)  2 ml  PRN 1X  PRN  4/27/20 07:00


 4/28/20 06:59 DC  





 


 Linezolid/Dextrose  300 ml @ 


 300 mls/hr  Q12HR  5/17/20 09:00


 5/20/20 08:11 DC 5/19/20 21:08


300 MLS/HR


 


 Lorazepam


  (Ativan Inj)  2 mg  PRN Q4HRS  PRN  5/17/20 19:15


    5/18/20 22:20


2 MG


 


 Magnesium Sulfate  50 ml @ 25


 mls/hr  1X  ONCE  5/10/20 09:00


 5/10/20 10:59 DC 5/10/20 10:44


25 MLS/HR


 


 Meropenem 1 gm/


 Sodium Chloride  100 ml @ 


 200 mls/hr  Q8HRS  4/28/20 14:00


    5/22/20 05:53


200 MLS/HR


 


 Meropenem 500 mg/


 Sodium Chloride  50 ml @ 


 100 mls/hr  Q12H  4/8/20 10:00


 4/28/20 12:37 DC 4/28/20 10:45


100 MLS/HR


 


 Metoclopramide HCl


  (Reglan Vial)  10 mg  PRN Q3HRS  PRN  5/9/20 16:45


    5/14/20 04:25


10 MG


 


 Metoprolol


 Tartrate


  (Lopressor Vial)  5 mg  1X  ONCE  5/17/20 15:15


 5/17/20 15:16 DC 5/17/20 15:31


5 MG


 


 Metronidazole  100 ml @ 


 100 mls/hr  Q8HRS  4/14/20 10:00


 4/21/20 08:10 DC 4/21/20 06:04


100 MLS/HR


 


 Micafungin Sodium


 100 mg/Dextrose  100 ml @ 


 100 mls/hr  Q24H  5/4/20 11:00


    5/21/20 10:53


100 MLS/HR


 


 Midazolam HCl


  (Versed)  5 mg  1X  ONCE  5/8/20 09:15


 5/8/20 09:16 DC 5/8/20 09:30


1 MG


 


 Midazolam HCl 100


 mg/Sodium Chloride  100 ml @ 7


 mls/hr  CONT  PRN  3/28/20 16:00


    4/8/20 15:35


7 MLS/HR


 


 Midazolam HCl 50


 mg/Sodium Chloride  50 ml @ 0


 mls/hr  CONT  PRN  3/23/20 08:15


 3/28/20 15:59 DC 3/26/20 22:39


7 MLS/HR


 


 Morphine Sulfate


  (Morphine


 Sulfate)  2 mg  PRN Q2HR  PRN  3/16/20 05:00


 3/17/20 14:15 DC 3/17/20 12:26


2 MG


 


 Multi-Ingred


 Cream/Lotion/Oil/


 Oint


  (Artificial


 Tears Eye


 Ointment)  1 thomas  PRN Q1HR  PRN  3/25/20 17:30


    4/13/20 08:19


1 THOMAS


 


 Naloxone HCl


  (Narcan)  0.4 mg  PRN Q2MIN  PRN  4/27/20 14:30


   UNV  





 


 Norepinephrine


 Bitartrate 8 mg/


 Dextrose  258 ml @ 


 17.299 mls/


 hr  CONT  PRN  3/17/20 15:30


 4/17/20 09:19 DC 4/14/20 12:48


20.9 MLS/HR


 


 Ondansetron HCl


  (Zofran)  4 mg  STK-MED ONCE  4/27/20 10:56


 4/27/20 10:57 DC  





 


 Pantoprazole


 Sodium


  (PROTONIX VIAL


 for IV PUSH)  40 mg  DAILYAC  3/16/20 11:30


    5/21/20 08:22


40 MG


 


 Phenylephrine HCl


  (PHENYLEPHRINE


 in 0.9% NACL PF)  1 mg  STK-MED ONCE  4/27/20 12:34


 4/27/20 12:34 DC  





 


 Piperacillin Sod/


 Tazobactam Sod


 4.5 gm/Sodium


 Chloride  100 ml @ 


 200 mls/hr  1X  ONCE  3/16/20 06:00


 3/16/20 06:29 DC 3/16/20 05:44


200 MLS/HR


 


 Potassium


 Chloride 110 meq/


 Magnesium Sulfate


 20 meq/


 Multivitamins 10


 ml/Chromium/


 Copper/Manganese/


 Seleni/Zn 1 ml/


 Insulin Human


 Regular 15 unit/


 Total Parenteral


 Nutrition/Amino


 Acids/Dextrose/


 Fat Emulsion


 Intravenous  1,800 ml @ 


 75 mls/hr  TPN  CONT  5/21/20 22:00


 5/22/20 21:59  5/21/20 22:01


75 MLS/HR


 


 Potassium


 Chloride 15 meq/


 Bicarbonate


 Dialysis Soln w/


 out KCl  5,007.5 ml


  @ 1,000 mls/


 hr  Q5H1M  3/29/20 20:00


 4/2/20 13:08 DC 4/1/20 18:14


1,000 MLS/HR


 


 Potassium


 Chloride 20 meq/


 Bicarbonate


 Dialysis Soln w/


 out KCl  5,010 ml @ 


 1,000 mls/hr  Q5H1M  3/25/20 16:00


 3/29/20 19:59 DC 3/29/20 14:54


1,000 MLS/HR


 


 Potassium


 Chloride 75 meq/


 Magnesium Sulfate


 15 meq/


 Multivitamins 10


 ml/Chromium/


 Copper/Manganese/


 Seleni/Zn 0.5 ml/


 Insulin Human


 Regular 15 unit/


 Total Parenteral


 Nutrition/Amino


 Acids/Dextrose/


 Fat Emulsion


 Intravenous  1,920 ml @ 


 80 mls/hr  TPN  CONT  5/9/20 22:00


 5/10/20 21:59 DC 5/9/20 22:41


80 MLS/HR


 


 Potassium


 Chloride 75 meq/


 Magnesium Sulfate


 15 meq/Calcium


 Gluconate 8 meq/


 Multivitamins 10


 ml/Chromium/


 Copper/Manganese/


 Seleni/Zn 0.5 ml/


 Insulin Human


 Regular 15 unit/


 Total Parenteral


 Nutrition/Amino


 Acids/Dextrose/


 Fat Emulsion


 Intravenous  1,920 ml @ 


 80 mls/hr  TPN  CONT  5/7/20 22:00


 5/8/20 21:59 DC 5/7/20 22:28


80 MLS/HR


 


 Potassium


 Chloride 75 meq/


 Magnesium Sulfate


 15 meq/Calcium


 Gluconate 8 meq/


 Multivitamins 10


 ml/Chromium/


 Copper/Manganese/


 Seleni/Zn 0.5 ml/


 Insulin Human


 Regular 20 unit/


 Total Parenteral


 Nutrition/Amino


 Acids/Dextrose/


 Fat Emulsion


 Intravenous  1,920 ml @ 


 80 mls/hr  TPN  CONT  5/6/20 22:00


 5/7/20 21:59 DC 5/6/20 22:00


80 MLS/HR


 


 Potassium


 Chloride 75 meq/


 Magnesium Sulfate


 15 meq/Calcium


 Gluconate 8 meq/


 Multivitamins 10


 ml/Chromium/


 Copper/Manganese/


 Seleni/Zn 0.5 ml/


 Insulin Human


 Regular 25 unit/


 Total Parenteral


 Nutrition/Amino


 Acids/Dextrose/


 Fat Emulsion


 Intravenous  1,920 ml @ 


 80 mls/hr  TPN  CONT  5/4/20 22:00


 5/5/20 21:59 DC 5/4/20 23:08


80 MLS/HR


 


 Potassium


 Chloride 75 meq/


 Magnesium Sulfate


 20 meq/Calcium


 Gluconate 10 meq/


 Multivitamins 10


 ml/Chromium/


 Copper/Manganese/


 Seleni/Zn 0.5 ml/


 Insulin Human


 Regular 25 unit/


 Total Parenteral


 Nutrition/Amino


 Acids/Dextrose/


 Fat Emulsion


 Intravenous  1,920 ml @ 


 80 mls/hr  TPN  CONT  5/3/20 22:00


 5/4/20 21:59 DC 5/3/20 22:04


80 MLS/HR


 


 Potassium


 Chloride 75 meq/


 Magnesium Sulfate


 20 meq/Calcium


 Gluconate 10 meq/


 Multivitamins 10


 ml/Chromium/


 Copper/Manganese/


 Seleni/Zn 0.5 ml/


 Insulin Human


 Regular 30 unit/


 Total Parenteral


 Nutrition/Amino


 Acids/Dextrose/


 Fat Emulsion


 Intravenous  1,920 ml @ 


 80 mls/hr  TPN  CONT  5/2/20 22:00


 5/3/20 22:00 DC 5/2/20 21:51


80 MLS/HR


 


 Potassium


 Chloride 80 meq/


 Magnesium Sulfate


 20 meq/


 Multivitamins 10


 ml/Chromium/


 Copper/Manganese/


 Seleni/Zn 0.5 ml/


 Insulin Human


 Regular 15 unit/


 Total Parenteral


 Nutrition/Amino


 Acids/Dextrose/


 Fat Emulsion


 Intravenous  1,920 ml @ 


 80 mls/hr  TPN  CONT  5/12/20 22:00


 5/13/20 21:59 DC 5/12/20 21:40


80 MLS/HR


 


 Potassium


 Chloride 80 meq/


 Magnesium Sulfate


 20 meq/


 Multivitamins 10


 ml/Chromium/


 Copper/Manganese/


 Seleni/Zn 1 ml/


 Insulin Human


 Regular 15 unit/


 Total Parenteral


 Nutrition/Amino


 Acids/Dextrose/


 Fat Emulsion


 Intravenous  1,920 ml @ 


 80 mls/hr  TPN  CONT  5/13/20 22:00


 5/14/20 21:59 DC 5/13/20 22:04


80 MLS/HR


 


 Potassium


 Chloride 90 meq/


 Magnesium Sulfate


 20 meq/


 Multivitamins 10


 ml/Chromium/


 Copper/Manganese/


 Seleni/Zn 1 ml/


 Insulin Human


 Regular 15 unit/


 Total Parenteral


 Nutrition/Amino


 Acids/Dextrose/


 Fat Emulsion


 Intravenous  1,800 ml @ 


 75 mls/hr  TPN  CONT  5/20/20 22:00


 5/21/20 21:59 DC 5/20/20 22:28


75 MLS/HR


 


 Potassium


 Chloride/Water  100 ml @ 


 100 mls/hr  1X  ONCE  5/14/20 11:00


 5/14/20 11:59 DC 5/14/20 11:34


100 MLS/HR


 


 Potassium


 Phosphate 20 mmol/


 Sodium Chloride  106.6667


 ml @ 


 51.667 m...  1X  ONCE  3/25/20 13:00


 3/25/20 15:03 DC 3/25/20 12:51


51.667 MLS/HR


 


 Potassium Acetate


 30 meq/Magnesium


 Sulfate 20 meq/


 Calcium Gluconate


 10 meq/


 Multivitamins 10


 ml/Chromium/


 Copper/Manganese/


 Seleni/Zn 0.5 ml/


 Insulin Human


 Regular 30 unit/


 Potassium


 Chloride 30 meq/


 Total Parenteral


 Nutrition/Amino


 Acids/Dextrose/


 Fat Emulsion


 Intravenous  1,920 ml @ 


 80 mls/hr  TPN  CONT  5/1/20 22:00


 5/2/20 21:59 DC 5/1/20 22:34


80 MLS/HR


 


 Potassium Acetate


 55 meq/Magnesium


 Sulfate 20 meq/


 Calcium Gluconate


 10 meq/


 Multivitamins 10


 ml/Chromium/


 Copper/Manganese/


 Seleni/Zn 0.5 ml/


 Insulin Human


 Regular 30 unit/


 Total Parenteral


 Nutrition/Amino


 Acids/Dextrose/


 Fat Emulsion


 Intravenous  1,920 ml @ 


 80 mls/hr  TPN  CONT  4/30/20 22:00


 5/1/20 21:59 DC 5/1/20 01:00


80 MLS/HR


 


 Potassium Acetate


 55 meq/Magnesium


 Sulfate 20 meq/


 Calcium Gluconate


 10 meq/


 Multivitamins 10


 ml/Chromium/


 Copper/Manganese/


 Seleni/Zn 0.5 ml/


 Insulin Human


 Regular 35 unit/


 Total Parenteral


 Nutrition/Amino


 Acids/Dextrose/


 Fat Emulsion


 Intravenous  1,920 ml @ 


 80 mls/hr  TPN  CONT  4/28/20 22:00


 4/29/20 21:59 DC 4/28/20 22:02


80 MLS/HR


 


 Potassium Acetate


 65 meq/Magnesium


 Sulfate 20 meq/


 Calcium Gluconate


 10 meq/


 Multivitamins 10


 ml/Chromium/


 Copper/Manganese/


 Seleni/Zn 0.5 ml/


 Insulin Human


 Regular 30 unit/


 Total Parenteral


 Nutrition/Amino


 Acids/Dextrose/


 Fat Emulsion


 Intravenous  1,920 ml @ 


 80 mls/hr  TPN  CONT  4/29/20 22:00


 4/30/20 21:59 DC 4/29/20 22:22


80 MLS/HR


 


 Prochlorperazine


 Edisylate


  (Compazine)  5 mg  PACU PRN  PRN  4/27/20 07:00


 4/28/20 06:59 DC  





 


 Propofol  20 ml @ As


 Directed  STK-MED ONCE  4/27/20 12:26


 4/27/20 12:27 DC  





 


 Ringer's Solution  1,000 ml @ 


 30 mls/hr  Q24H  4/27/20 07:00


 4/27/20 18:59 DC  





 


 Rocuronium Bromide


  (Zemuron)  50 mg  STK-MED ONCE  4/27/20 10:56


 4/27/20 10:57 DC  





 


 Saliva Substitute


  (Biotene


 Moisturizing


 Mouth)  2 spray  PRN Q15MIN  PRN  5/21/20 11:00


     





 


 Sevoflurane


  (Ultane)  60 ml  STK-MED ONCE  4/27/20 12:26


 4/27/20 12:27 DC  





 


 Sodium


 Bicarbonate 50


 meq/Sodium


 Chloride  1,050 ml @ 


 75 mls/hr  Q14H  3/18/20 07:30


 3/23/20 10:28 DC 3/22/20 21:10


75 MLS/HR


 


 Sodium Acetate 50


 meq/Potassium


 Acetate 55 meq/


 Magnesium Sulfate


 20 meq/Calcium


 Gluconate 10 meq/


 Multivitamins 10


 ml/Chromium/


 Copper/Manganese/


 Seleni/Zn 0.5 ml/


 Insulin Human


 Regular 35 unit/


 Total Parenteral


 Nutrition/Amino


 Acids/Dextrose/


 Fat Emulsion


 Intravenous  1,800 ml @ 


 75 mls/hr  TPN  CONT  4/25/20 22:00


 4/26/20 21:59 DC 4/25/20 22:03


75 MLS/HR


 


 Sodium Chloride  1,000 ml @ 


 25 mls/hr  Q24H  4/27/20 14:30


   UNV  





 


 Sodium Chloride


  (Normal Saline


 Flush)  3 ml  QSHIFT  PRN  4/27/20 13:45


     





 


 Sodium Chloride


 90 meq/Calcium


 Gluconate 10 meq/


 Multivitamins 10


 ml/Chromium/


 Copper/Manganese/


 Seleni/Zn 0.5 ml/


 Total Parenteral


 Nutrition/Amino


 Acids/Dextrose/


 Fat Emulsion


 Intravenous  1,512 ml @ 


 63 mls/hr  TPN  CONT  3/18/20 22:00


 3/19/20 21:59 DC 3/18/20 22:06


63 MLS/HR


 


 Sodium Chloride


 90 meq/Calcium


 Gluconate 10 meq/


 Multivitamins 10


 ml/Chromium/


 Copper/Manganese/


 Seleni/Zn 1 ml/


 Total Parenteral


 Nutrition/Amino


 Acids/Dextrose/


 Fat Emulsion


 Intravenous  55.005 ml


  @ 2.292


 mls/hr  TPN  CONT  3/18/20 22:00


 3/18/20 12:33 DC  





 


 Sodium Chloride


 90 meq/Magnesium


 Sulfate 10 meq/


 Calcium Gluconate


 20 meq/


 Multivitamins 10


 ml/Chromium/


 Copper/Manganese/


 Seleni/Zn 0.5 ml/


 Total Parenteral


 Nutrition/Amino


 Acids/Dextrose/


 Fat Emulsion


 Intravenous  1,512 ml @ 


 63 mls/hr  TPN  CONT  3/19/20 22:00


 3/20/20 21:59 DC 3/19/20 22:25


63 MLS/HR


 


 Sodium Chloride


 90 meq/Magnesium


 Sulfate 12 meq/


 Calcium Gluconate


 15 meq/


 Multivitamins 10


 ml/Chromium/


 Copper/Manganese/


 Seleni/Zn 0.5 ml/


 Insulin Human


 Regular 25 unit/


 Total Parenteral


 Nutrition/Amino


 Acids/Dextrose/


 Fat Emulsion


 Intravenous  1,400 ml @ 


 58.333 mls/


 hr  TPN  CONT  4/8/20 22:00


 4/9/20 21:59 DC 4/8/20 21:41


58.333 MLS/HR


 


 Sodium Chloride


 90 meq/Potassium


 Chloride 15 meq/


 Magnesium Sulfate


 12 meq/Calcium


 Gluconate 15 meq/


 Multivitamins 10


 ml/Chromium/


 Copper/Manganese/


 Seleni/Zn 0.5 ml/


 Insulin Human


 Regular 25 unit/


 Total Parenteral


 Nutrition/Amino


 Acids/Dextrose/


 Fat Emulsion


 Intravenous  1,400 ml @ 


 58.333 mls/


 hr  TPN  CONT  4/7/20 22:00


 4/8/20 21:59 DC 4/7/20 22:13


58.333 MLS/HR


 


 Sodium Chloride


 90 meq/Potassium


 Chloride 15 meq/


 Potassium


 Phosphate 10 mmol/


 Magnesium Sulfate


 8 meq/Calcium


 Gluconate 15 meq/


 Multivitamins 10


 ml/Chromium/


 Copper/Manganese/


 Seleni/Zn 0.5 ml/


 Insulin Human


 Regular 25 unit/


 Total Parenteral


 Nutrition/Amino


 Acids/Dextrose/


 Fat Emulsion


 Intravenous  1,400 ml @ 


 58.333 mls/


 hr  TPN  CONT  4/5/20 22:00


 4/6/20 21:59 DC 4/5/20 21:20


58.333 MLS/HR


 


 Sodium Chloride


 90 meq/Potassium


 Chloride 15 meq/


 Potassium


 Phosphate 10 mmol/


 Magnesium Sulfate


 10 meq/Calcium


 Gluconate 20 meq/


 Multivitamins 10


 ml/Chromium/


 Copper/Manganese/


 Seleni/Zn 0.5 ml/


 Total Parenteral


 Nutrition/Amino


 Acids/Dextrose/


 Fat Emulsion


 Intravenous  1,400 ml @ 


 58.333 mls/


 hr  TPN  CONT  3/23/20 22:00


 3/24/20 21:59 DC 3/23/20 21:42


58.333 MLS/HR


 


 Sodium Chloride


 90 meq/Potassium


 Chloride 15 meq/


 Potassium


 Phosphate 10 mmol/


 Magnesium Sulfate


 12 meq/Calcium


 Gluconate 15 meq/


 Multivitamins 10


 ml/Chromium/


 Copper/Manganese/


 Seleni/Zn 0.5 ml/


 Insulin Human


 Regular 25 unit/


 Total Parenteral


 Nutrition/Amino


 Acids/Dextrose/


 Fat Emulsion


 Intravenous  1,400 ml @ 


 58.333 mls/


 hr  TPN  CONT  4/6/20 22:00


 4/7/20 21:59 DC 4/6/20 22:24


58.333 MLS/HR


 


 Sodium Chloride


 90 meq/Potassium


 Chloride 15 meq/


 Potassium


 Phosphate 15 mmol/


 Magnesium Sulfate


 10 meq/Calcium


 Gluconate 15 meq/


 Multivitamins 10


 ml/Chromium/


 Copper/Manganese/


 Seleni/Zn 0.5 ml/


 Total Parenteral


 Nutrition/Amino


 Acids/Dextrose/


 Fat Emulsion


 Intravenous  1,400 ml @ 


 58.333 mls/


 hr  TPN  CONT  3/24/20 22:00


 3/25/20 21:59 DC 3/24/20 22:17


58.333 MLS/HR


 


 Sodium Chloride


 90 meq/Potassium


 Chloride 15 meq/


 Potassium


 Phosphate 15 mmol/


 Magnesium Sulfate


 10 meq/Calcium


 Gluconate 20 meq/


 Multivitamins 10


 ml/Chromium/


 Copper/Manganese/


 Seleni/Zn 0.5 ml/


 Total Parenteral


 Nutrition/Amino


 Acids/Dextrose/


 Fat Emulsion


 Intravenous  1,200 ml @ 


 50 mls/hr  TPN  CONT  3/22/20 22:00


 3/22/20 14:17 DC  





 


 Sodium Chloride


 90 meq/Potassium


 Chloride 15 meq/


 Potassium


 Phosphate 18 mmol/


 Magnesium Sulfate


 8 meq/Calcium


 Gluconate 15 meq/


 Multivitamins 10


 ml/Chromium/


 Copper/Manganese/


 Seleni/Zn 0.5 ml/


 Insulin Human


 Regular 10 unit/


 Total Parenteral


 Nutrition/Amino


 Acids/Dextrose/


 Fat Emulsion


 Intravenous  1,400 ml @ 


 58.333 mls/


 hr  TPN  CONT  3/27/20 22:00


 3/28/20 21:59 DC 3/27/20 21:43


58.333 MLS/HR


 


 Sodium Chloride


 90 meq/Potassium


 Chloride 15 meq/


 Potassium


 Phosphate 18 mmol/


 Magnesium Sulfate


 8 meq/Calcium


 Gluconate 15 meq/


 Multivitamins 10


 ml/Chromium/


 Copper/Manganese/


 Seleni/Zn 0.5 ml/


 Insulin Human


 Regular 15 unit/


 Total Parenteral


 Nutrition/Amino


 Acids/Dextrose/


 Fat Emulsion


 Intravenous  1,400 ml @ 


 58.333 mls/


 hr  TPN  CONT  3/30/20 22:00


 3/31/20 21:59 DC 3/30/20 21:47


58.333 MLS/HR


 


 Sodium Chloride


 90 meq/Potassium


 Chloride 15 meq/


 Potassium


 Phosphate 18 mmol/


 Magnesium Sulfate


 8 meq/Calcium


 Gluconate 15 meq/


 Multivitamins 10


 ml/Chromium/


 Copper/Manganese/


 Seleni/Zn 0.5 ml/


 Insulin Human


 Regular 20 unit/


 Total Parenteral


 Nutrition/Amino


 Acids/Dextrose/


 Fat Emulsion


 Intravenous  1,400 ml @ 


 58.333 mls/


 hr  TPN  CONT  4/2/20 22:00


 4/3/20 21:59 DC 4/2/20 22:45


58.333 MLS/HR


 


 Sodium Chloride


 90 meq/Potassium


 Chloride 15 meq/


 Potassium


 Phosphate 18 mmol/


 Magnesium Sulfate


 8 meq/Calcium


 Gluconate 15 meq/


 Multivitamins 10


 ml/Chromium/


 Copper/Manganese/


 Seleni/Zn 0.5 ml/


 Total Parenteral


 Nutrition/Amino


 Acids/Dextrose/


 Fat Emulsion


 Intravenous  1,400 ml @ 


 58.333 mls/


 hr  TPN  CONT  3/26/20 22:00


 3/27/20 21:59 DC 3/26/20 22:00


58.333 MLS/HR


 


 Sodium Chloride


 90 meq/Potassium


 Phosphate 15 mmol/


 Magnesium Sulfate


 12 meq/Calcium


 Gluconate 15 meq/


 Multivitamins 10


 ml/Chromium/


 Copper/Manganese/


 Seleni/Zn 0.5 ml/


 Insulin Human


 Regular 30 unit/


 Total Parenteral


 Nutrition/Amino


 Acids/Dextrose/


 Fat Emulsion


 Intravenous  1,400 ml @ 


 58.333 mls/


 hr  TPN  CONT  4/10/20 22:00


 4/11/20 21:59 DC 4/10/20 21:49


58.333 MLS/HR


 


 Sodium Chloride


 90 meq/Potassium


 Phosphate 15 mmol/


 Magnesium Sulfate


 12 meq/Calcium


 Gluconate 15 meq/


 Multivitamins 10


 ml/Chromium/


 Copper/Manganese/


 Seleni/Zn 0.5 ml/


 Insulin Human


 Regular 40 unit/


 Total Parenteral


 Nutrition/Amino


 Acids/Dextrose/


 Fat Emulsion


 Intravenous  1,400 ml @ 


 58.333 mls/


 hr  TPN  CONT  4/11/20 22:00


 4/12/20 21:59 DC 4/11/20 21:21


58.333 MLS/HR


 


 Sodium Chloride


 90 meq/Potassium


 Phosphate 19 mmol/


 Magnesium Sulfate


 12 meq/Calcium


 Gluconate 15 meq/


 Multivitamins 10


 ml/Chromium/


 Copper/Manganese/


 Seleni/Zn 0.5 ml/


 Insulin Human


 Regular 40 unit/


 Total Parenteral


 Nutrition/Amino


 Acids/Dextrose/


 Fat Emulsion


 Intravenous  1,400 ml @ 


 58.333 mls/


 hr  TPN  CONT  4/12/20 22:00


 4/13/20 21:59 DC 4/12/20 21:54


58.333 MLS/HR


 


 Sodium Chloride


 90 meq/Potassium


 Phosphate 5 mmol/


 Magnesium Sulfate


 12 meq/Calcium


 Gluconate 15 meq/


 Multivitamins 10


 ml/Chromium/


 Copper/Manganese/


 Seleni/Zn 0.5 ml/


 Insulin Human


 Regular 30 unit/


 Total Parenteral


 Nutrition/Amino


 Acids/Dextrose/


 Fat Emulsion


 Intravenous  1,400 ml @ 


 58.333 mls/


 hr  TPN  CONT  4/9/20 22:00


 4/10/20 21:59 DC 4/9/20 22:08


58.333 MLS/HR


 


 Sodium Chloride


 100 meq/Potassium


 Chloride 40 meq/


 Magnesium Sulfate


 15 meq/Calcium


 Gluconate 15 meq/


 Multivitamins 10


 ml/Chromium/


 Copper/Manganese/


 Seleni/Zn 0.5 ml/


 Insulin Human


 Regular 35 unit/


 Total Parenteral


 Nutrition/Amino


 Acids/Dextrose/


 Fat Emulsion


 Intravenous  1,400 ml @ 


 58.333 mls/


 hr  TPN  CONT  4/19/20 22:00


 4/20/20 21:59 DC 4/19/20 22:46


58.333 MLS/HR


 


 Sodium Chloride


 100 meq/Potassium


 Chloride 40 meq/


 Magnesium Sulfate


 20 meq/Calcium


 Gluconate 10 meq/


 Multivitamins 10


 ml/Chromium/


 Copper/Manganese/


 Seleni/Zn 0.5 ml/


 Insulin Human


 Regular 35 unit/


 Total Parenteral


 Nutrition/Amino


 Acids/Dextrose/


 Fat Emulsion


 Intravenous  1,400 ml @ 


 58.333 mls/


 hr  TPN  CONT  4/23/20 22:00


 4/24/20 21:59 DC 4/24/20 00:06


58.333 MLS/HR


 


 Sodium Chloride


 100 meq/Potassium


 Chloride 40 meq/


 Magnesium Sulfate


 20 meq/Calcium


 Gluconate 15 meq/


 Multivitamins 10


 ml/Chromium/


 Copper/Manganese/


 Seleni/Zn 0.5 ml/


 Insulin Human


 Regular 35 unit/


 Total Parenteral


 Nutrition/Amino


 Acids/Dextrose/


 Fat Emulsion


 Intravenous  1,400 ml @ 


 58.333 mls/


 hr  TPN  CONT  4/22/20 22:00


 4/23/20 21:59 DC 4/22/20 22:27


58.333 MLS/HR


 


 Sodium Chloride


 100 meq/Potassium


 Phosphate 10 mmol/


 Magnesium Sulfate


 12 meq/Calcium


 Gluconate 15 meq/


 Multivitamins 10


 ml/Chromium/


 Copper/Manganese/


 Seleni/Zn 0.5 ml/


 Insulin Human


 Regular 35 unit/


 Potassium


 Chloride 20 meq/


 Total Parenteral


 Nutrition/Amino


 Acids/Dextrose/


 Fat Emulsion


 Intravenous  1,400 ml @ 


 58.333 mls/


 hr  TPN  CONT  4/16/20 22:00


 4/17/20 21:59 DC 4/16/20 22:10


58.333 MLS/HR


 


 Sodium Chloride


 100 meq/Potassium


 Phosphate 19 mmol/


 Magnesium Sulfate


 12 meq/Calcium


 Gluconate 15 meq/


 Multivitamins 10


 ml/Chromium/


 Copper/Manganese/


 Seleni/Zn 0.5 ml/


 Insulin Human


 Regular 40 unit/


 Potassium


 Chloride 20 meq/


 Total Parenteral


 Nutrition/Amino


 Acids/Dextrose/


 Fat Emulsion


 Intravenous  1,400 ml @ 


 58.333 mls/


 hr  TPN  CONT  4/15/20 22:00


 4/16/20 21:59 DC 4/15/20 21:20


58.333 MLS/HR


 


 Sodium Chloride


 100 meq/Potassium


 Phosphate 5 mmol/


 Magnesium Sulfate


 12 meq/Calcium


 Gluconate 15 meq/


 Multivitamins 10


 ml/Chromium/


 Copper/Manganese/


 Seleni/Zn 0.5 ml/


 Insulin Human


 Regular 35 unit/


 Potassium


 Chloride 20 meq/


 Total Parenteral


 Nutrition/Amino


 Acids/Dextrose/


 Fat Emulsion


 Intravenous  1,400 ml @ 


 58.333 mls/


 hr  TPN  CONT  4/17/20 22:00


 4/18/20 21:59 DC 4/17/20 22:59


58.333 MLS/HR


 


 Succinylcholine


 Chloride


  (Anectine)  120 mg  1X  ONCE  3/23/20 08:30


 3/23/20 08:31 DC 3/23/20 08:34


120 MG


 


 Vecuronium Bromide


  (Norcuron Bolus)  6 mg  PRN Q6HRS  PRN  5/7/20 19:15


 5/7/20 19:35 DC  














Labs:


Lab





Laboratory Tests








Test


 5/21/20


12:06 5/22/20


00:39 5/22/20


06:04


 


Glucose (Fingerstick)


 192 mg/dL


(70-99) 149 mg/dL


(70-99) 164 mg/dL


(70-99)











Objective:


Assessment:


Fever intermittent could be from underlying pancreatitis,  


Acute pancreatitis with persistent  necrosis


CT a/p 4/9


    Increased ascites. Persistent evidence of necrotizing pancreatitis with 

fluid and phlegmon


   at the pancreas


   4/27 status post ROBERT drain placement; yeast


   5/6 fluid  candida parapsilosis fluid amylase high


Cholelithiasis with thickening of the gallbladder wall.


Leucocytosis 


JED,Hyperkalemia, Metabolic acidosis off dialysis


Acute hypoxic resp failure ,bilateral pleural effusion and atelectasis


hypocalcemia 


Prediabetes


HTN


s/p trach





Plan:


Plan of Care


  


DC Merrem 4/8


cont  Micafungin


Off dapto/ zyvox


f/u cultures ,BC neg 5/17


Maintain aspiration precaution


Supportive care


Follow-up labs and cultures


Discussed with nursing staff











IVAN FRANZ MD           May 22, 2020 08:16

## 2020-05-22 NOTE — PDOC
Subjective:


Subjective:


Gives me a thumbs up.





Objective:


Objective:


D/w nurse and Dr. Leyva.


Vital Signs:





                                   Vital Signs








  Date Time  Temp Pulse Resp B/P (MAP) Pulse Ox O2 Delivery O2 Flow Rate FiO2


 


5/22/20 10:01   28  98 Tracheal Collar 8.0 


 


5/22/20 10:00  101  146/66 (92)    


 


5/22/20 08:00 99.2       





 99.2       








Labs:





Laboratory Tests








Test


 5/21/20


12:06 5/22/20


00:39 5/22/20


06:04 5/22/20


08:40


 


Glucose (Fingerstick) 192 mg/dL  149 mg/dL  164 mg/dL  


 


White Blood Count    8.8 x10^3/uL 


 


Red Blood Count    2.77 x10^6/uL 


 


Hemoglobin    7.9 g/dL 


 


Hematocrit    24.4 % 


 


Mean Corpuscular Volume    88 fL 


 


Mean Corpuscular Hemoglobin    29 pg 


 


Mean Corpuscular Hemoglobin


Concent 


 


 


 33 g/dL 





 


Red Cell Distribution Width    18.7 % 


 


Platelet Count    348 x10^3/uL 


 


Sodium Level    140 mmol/L 


 


Potassium Level    4.3 mmol/L 


 


Chloride Level    99 mmol/L 


 


Carbon Dioxide Level    36 mmol/L 


 


Anion Gap    5 


 


Blood Urea Nitrogen    22 mg/dL 


 


Creatinine    0.6 mg/dL 


 


Estimated GFR


(Cockcroft-Gault) 


 


 


 106.3 





 


Glucose Level    126 mg/dL 


 


Calcium Level    9.3 mg/dL 


 


Phosphorus Level    3.7 mg/dL 


 


Magnesium Level    2.1 mg/dL 





  BLOOD CULTURE  Final  


        NO GROWTH AFTER 5 DAYS





PE:





GEN: NAD


LUNGS: trach collar


HEART: RRR


ABD: soft, NG bilious


NEURO/PSYCH: A & O 3





A/P:


Gallstone pancreatitis, MOSF





--


Continue support.





Hemodynamically unstable?:  No


Is patient in severe pain?:  No


Is NPO status required?:  Yes











RAGHAVENDRA MURCIA         May 22, 2020 10:39

## 2020-05-22 NOTE — PDOC
PULMONARY PROGRESS NOTES


Subjective


Patient looks a lot better today sitting up in the chair utilizing Passy-Muncy 

valve able to phonate





Patient intubated on 3/23 , s/p trach 4/6,


Vitals





Vital Signs








  Date Time  Temp Pulse Resp B/P (MAP) Pulse Ox O2 Delivery O2 Flow Rate FiO2


 


5/22/20 15:00   24  99 Tracheal Collar 8.0 


 


5/22/20 14:00  103  149/80 (103)    


 


5/22/20 12:00 99.2       





 99.2       








ROS:  No Chest Pain, No Abdominal Pain, No Increase Cough


General:  Alert, No acute distress


HEENT:  Other (nc at perrl     neck trach site ok  no lad  no thyromegaly)


Lungs:  Wheezing, Other (decrease bs)


Cardiovascular:  S1, S2


Abdomen:  Soft, Non-tender, Other (distended)


Neuro Exam:  Alert


Extremities:  Other (+3 generalized edema )


Skin:  Warm, Dry


Labs





Laboratory Tests








Test


 5/20/20


16:28 5/21/20


00:44 5/21/20


06:10 5/21/20


06:22


 


Glucose (Fingerstick)


 141 mg/dL


(70-99) 165 mg/dL


(70-99) 


 122 mg/dL


(70-99)


 


White Blood Count


 


 


 8.3 x10^3/uL


(4.0-11.0) 





 


Red Blood Count


 


 


 2.72 x10^6/uL


(3.50-5.40) 





 


Hemoglobin


 


 


 7.8 g/dL


(12.0-15.5) 





 


Hematocrit


 


 


 23.8 %


(36.0-47.0) 





 


Mean Corpuscular Volume   87 fL ()  


 


Mean Corpuscular Hemoglobin   29 pg (25-35)  


 


Mean Corpuscular Hemoglobin


Concent 


 


 33 g/dL


(31-37) 





 


Red Cell Distribution Width


 


 


 18.2 %


(11.5-14.5) 





 


Platelet Count


 


 


 380 x10^3/uL


(140-400) 





 


Sodium Level


 


 


 139 mmol/L


(136-145) 





 


Potassium Level


 


 


 4.0 mmol/L


(3.5-5.1) 





 


Chloride Level


 


 


 100 mmol/L


() 





 


Carbon Dioxide Level


 


 


 39 mmol/L


(21-32) 





 


Anion Gap   0 (6-14)  


 


Blood Urea Nitrogen


 


 


 24 mg/dL


(7-20) 





 


Creatinine


 


 


 0.7 mg/dL


(0.6-1.0) 





 


Estimated GFR


(Cockcroft-Gault) 


 


 88.9 


 





 


Glucose Level


 


 


 118 mg/dL


(70-99) 





 


Calcium Level


 


 


 8.6 mg/dL


(8.5-10.1) 





 


Phosphorus Level


 


 


 3.0 mg/dL


(2.6-4.7) 





 


Magnesium Level


 


 


 1.9 mg/dL


(1.8-2.4) 





 


Test


 5/21/20


12:06 5/22/20


00:39 5/22/20


06:04 5/22/20


08:40


 


Glucose (Fingerstick)


 192 mg/dL


(70-99) 149 mg/dL


(70-99) 164 mg/dL


(70-99) 





 


White Blood Count


 


 


 


 8.8 x10^3/uL


(4.0-11.0)


 


Red Blood Count


 


 


 


 2.77 x10^6/uL


(3.50-5.40)


 


Hemoglobin


 


 


 


 7.9 g/dL


(12.0-15.5)


 


Hematocrit


 


 


 


 24.4 %


(36.0-47.0)


 


Mean Corpuscular Volume    88 fL () 


 


Mean Corpuscular Hemoglobin    29 pg (25-35) 


 


Mean Corpuscular Hemoglobin


Concent 


 


 


 33 g/dL


(31-37)


 


Red Cell Distribution Width


 


 


 


 18.7 %


(11.5-14.5)


 


Platelet Count


 


 


 


 348 x10^3/uL


(140-400)


 


Sodium Level


 


 


 


 140 mmol/L


(136-145)


 


Potassium Level


 


 


 


 4.3 mmol/L


(3.5-5.1)


 


Chloride Level


 


 


 


 99 mmol/L


()


 


Carbon Dioxide Level


 


 


 


 36 mmol/L


(21-32)


 


Anion Gap    5 (6-14) 


 


Blood Urea Nitrogen


 


 


 


 22 mg/dL


(7-20)


 


Creatinine


 


 


 


 0.6 mg/dL


(0.6-1.0)


 


Estimated GFR


(Cockcroft-Gault) 


 


 


 106.3 





 


Glucose Level


 


 


 


 126 mg/dL


(70-99)


 


Calcium Level


 


 


 


 9.3 mg/dL


(8.5-10.1)


 


Phosphorus Level


 


 


 


 3.7 mg/dL


(2.6-4.7)


 


Magnesium Level


 


 


 


 2.1 mg/dL


(1.8-2.4)


 


Test


 5/22/20


11:25 


 


 





 


Glucose (Fingerstick)


 157 mg/dL


(70-99) 


 


 











Laboratory Tests








Test


 5/22/20


00:39 5/22/20


06:04 5/22/20


08:40 5/22/20


11:25


 


Glucose (Fingerstick)


 149 mg/dL


(70-99) 164 mg/dL


(70-99) 


 157 mg/dL


(70-99)


 


White Blood Count


 


 


 8.8 x10^3/uL


(4.0-11.0) 





 


Red Blood Count


 


 


 2.77 x10^6/uL


(3.50-5.40) 





 


Hemoglobin


 


 


 7.9 g/dL


(12.0-15.5) 





 


Hematocrit


 


 


 24.4 %


(36.0-47.0) 





 


Mean Corpuscular Volume   88 fL ()  


 


Mean Corpuscular Hemoglobin   29 pg (25-35)  


 


Mean Corpuscular Hemoglobin


Concent 


 


 33 g/dL


(31-37) 





 


Red Cell Distribution Width


 


 


 18.7 %


(11.5-14.5) 





 


Platelet Count


 


 


 348 x10^3/uL


(140-400) 





 


Sodium Level


 


 


 140 mmol/L


(136-145) 





 


Potassium Level


 


 


 4.3 mmol/L


(3.5-5.1) 





 


Chloride Level


 


 


 99 mmol/L


() 





 


Carbon Dioxide Level


 


 


 36 mmol/L


(21-32) 





 


Anion Gap   5 (6-14)  


 


Blood Urea Nitrogen


 


 


 22 mg/dL


(7-20) 





 


Creatinine


 


 


 0.6 mg/dL


(0.6-1.0) 





 


Estimated GFR


(Cockcroft-Gault) 


 


 106.3 


 





 


Glucose Level


 


 


 126 mg/dL


(70-99) 





 


Calcium Level


 


 


 9.3 mg/dL


(8.5-10.1) 





 


Phosphorus Level


 


 


 3.7 mg/dL


(2.6-4.7) 





 


Magnesium Level


 


 


 2.1 mg/dL


(1.8-2.4) 











Medications





Active Scripts








 Medications  Dose


 Route/Sig


 Max Daily Dose Days Date Category


 


 Bisoprolol


 Fumarate 5 Mg


 Tablet  10 Mg


 PO DAILY


   3/16/20 Reported








Comments


CXR  reviewed.





Impression


.


IMPRESSION:


1.  Acute hypoxemic respiratory failure secondary to ARDS status post trach,


2.  Gallstone pancreatitis


3.  Severe metabolic acidosis.stable


4.  Acute kidney injury-stable, Off HD-- continue to improve 


5.  Acute gallstone pancreatitis.


6.  Hypoalbuminemia.


7.  Moderate persistent effusions, s/p left thora  5/12


8.  Fever-  Per ID, per surgery--resolved 


9.  Chronic anemia


10. Covid 19 testing negative


11. Moderate to large ascites-S/P paracentisis


12.S/P paracentisis with 4 liters removed on 4/15/20


13. S/P IR drain placement on 5/8/2020





Plan


.


Continue current support


Continue the same





Lasix 40 mg twice daily





Patient placed on assist control last evening for respiratory distress


s/p  thoracentesis, 5/12, 3 litres removed


cap trach as tolerated


Follow surgery recs-- S/P 3 drain placed in IR on 5/8/2020


Follow ID recs for [id number]


Follow nephrology recs 


Continue TPN 


DVT/GI PPX: heparin SQ/ protonix 


D/W RN and RT, pt





CODE:FULL











STEVE MIRANDA MD              May 22, 2020 15:07

## 2020-05-23 VITALS — SYSTOLIC BLOOD PRESSURE: 120 MMHG | DIASTOLIC BLOOD PRESSURE: 66 MMHG

## 2020-05-23 VITALS — DIASTOLIC BLOOD PRESSURE: 75 MMHG | SYSTOLIC BLOOD PRESSURE: 151 MMHG

## 2020-05-23 VITALS — DIASTOLIC BLOOD PRESSURE: 66 MMHG | SYSTOLIC BLOOD PRESSURE: 126 MMHG

## 2020-05-23 VITALS — DIASTOLIC BLOOD PRESSURE: 59 MMHG | SYSTOLIC BLOOD PRESSURE: 103 MMHG

## 2020-05-23 VITALS — DIASTOLIC BLOOD PRESSURE: 46 MMHG | SYSTOLIC BLOOD PRESSURE: 87 MMHG

## 2020-05-23 VITALS — SYSTOLIC BLOOD PRESSURE: 100 MMHG | DIASTOLIC BLOOD PRESSURE: 57 MMHG

## 2020-05-23 VITALS — SYSTOLIC BLOOD PRESSURE: 87 MMHG | DIASTOLIC BLOOD PRESSURE: 49 MMHG

## 2020-05-23 VITALS — DIASTOLIC BLOOD PRESSURE: 60 MMHG | SYSTOLIC BLOOD PRESSURE: 115 MMHG

## 2020-05-23 VITALS — DIASTOLIC BLOOD PRESSURE: 51 MMHG | SYSTOLIC BLOOD PRESSURE: 98 MMHG

## 2020-05-23 VITALS — DIASTOLIC BLOOD PRESSURE: 61 MMHG | SYSTOLIC BLOOD PRESSURE: 90 MMHG

## 2020-05-23 VITALS — SYSTOLIC BLOOD PRESSURE: 100 MMHG | DIASTOLIC BLOOD PRESSURE: 63 MMHG

## 2020-05-23 VITALS — DIASTOLIC BLOOD PRESSURE: 58 MMHG | SYSTOLIC BLOOD PRESSURE: 108 MMHG

## 2020-05-23 VITALS — SYSTOLIC BLOOD PRESSURE: 107 MMHG | DIASTOLIC BLOOD PRESSURE: 65 MMHG

## 2020-05-23 VITALS — DIASTOLIC BLOOD PRESSURE: 63 MMHG | SYSTOLIC BLOOD PRESSURE: 128 MMHG

## 2020-05-23 VITALS — DIASTOLIC BLOOD PRESSURE: 66 MMHG | SYSTOLIC BLOOD PRESSURE: 145 MMHG

## 2020-05-23 VITALS — DIASTOLIC BLOOD PRESSURE: 66 MMHG | SYSTOLIC BLOOD PRESSURE: 116 MMHG

## 2020-05-23 VITALS — SYSTOLIC BLOOD PRESSURE: 102 MMHG | DIASTOLIC BLOOD PRESSURE: 60 MMHG

## 2020-05-23 VITALS — SYSTOLIC BLOOD PRESSURE: 107 MMHG | DIASTOLIC BLOOD PRESSURE: 63 MMHG

## 2020-05-23 VITALS — DIASTOLIC BLOOD PRESSURE: 68 MMHG | SYSTOLIC BLOOD PRESSURE: 115 MMHG

## 2020-05-23 VITALS — SYSTOLIC BLOOD PRESSURE: 122 MMHG | DIASTOLIC BLOOD PRESSURE: 56 MMHG

## 2020-05-23 VITALS — SYSTOLIC BLOOD PRESSURE: 111 MMHG | DIASTOLIC BLOOD PRESSURE: 62 MMHG

## 2020-05-23 VITALS — DIASTOLIC BLOOD PRESSURE: 73 MMHG | SYSTOLIC BLOOD PRESSURE: 104 MMHG

## 2020-05-23 VITALS — DIASTOLIC BLOOD PRESSURE: 117 MMHG | SYSTOLIC BLOOD PRESSURE: 168 MMHG

## 2020-05-23 RX ADMIN — DEXTROSE SCH MLS/HR: 50 INJECTION, SOLUTION INTRAVENOUS at 10:33

## 2020-05-23 RX ADMIN — PANTOPRAZOLE SODIUM SCH MG: 40 INJECTION, POWDER, FOR SOLUTION INTRAVENOUS at 10:33

## 2020-05-23 RX ADMIN — ENOXAPARIN SODIUM SCH MG: 40 INJECTION SUBCUTANEOUS at 17:50

## 2020-05-23 RX ADMIN — ONDANSETRON PRN MG: 2 INJECTION INTRAMUSCULAR; INTRAVENOUS at 13:59

## 2020-05-23 RX ADMIN — INSULIN LISPRO SCH UNITS: 100 INJECTION, SOLUTION INTRAVENOUS; SUBCUTANEOUS at 23:54

## 2020-05-23 RX ADMIN — BACITRACIN SCH MLS/HR: 5000 INJECTION, POWDER, FOR SOLUTION INTRAMUSCULAR at 13:37

## 2020-05-23 RX ADMIN — INSULIN LISPRO SCH UNITS: 100 INJECTION, SOLUTION INTRAVENOUS; SUBCUTANEOUS at 00:00

## 2020-05-23 RX ADMIN — FENTANYL CITRATE PRN MCG: 50 INJECTION INTRAMUSCULAR; INTRAVENOUS at 22:40

## 2020-05-23 RX ADMIN — FENTANYL CITRATE PRN MCG: 50 INJECTION INTRAMUSCULAR; INTRAVENOUS at 02:40

## 2020-05-23 RX ADMIN — DEXMEDETOMIDINE HYDROCHLORIDE PRN MLS/HR: 100 INJECTION, SOLUTION, CONCENTRATE INTRAVENOUS at 18:47

## 2020-05-23 RX ADMIN — FENTANYL CITRATE PRN MCG: 50 INJECTION INTRAMUSCULAR; INTRAVENOUS at 17:51

## 2020-05-23 RX ADMIN — INSULIN LISPRO SCH UNITS: 100 INJECTION, SOLUTION INTRAVENOUS; SUBCUTANEOUS at 06:08

## 2020-05-23 RX ADMIN — FUROSEMIDE SCH MG: 10 INJECTION, SOLUTION INTRAMUSCULAR; INTRAVENOUS at 10:34

## 2020-05-23 RX ADMIN — INSULIN LISPRO SCH UNITS: 100 INJECTION, SOLUTION INTRAVENOUS; SUBCUTANEOUS at 12:00

## 2020-05-23 RX ADMIN — ONDANSETRON PRN MG: 2 INJECTION INTRAMUSCULAR; INTRAVENOUS at 20:12

## 2020-05-23 RX ADMIN — FENTANYL CITRATE PRN MCG: 50 INJECTION INTRAMUSCULAR; INTRAVENOUS at 10:34

## 2020-05-23 RX ADMIN — FUROSEMIDE SCH MG: 10 INJECTION, SOLUTION INTRAMUSCULAR; INTRAVENOUS at 14:00

## 2020-05-23 RX ADMIN — DEXMEDETOMIDINE HYDROCHLORIDE PRN MLS/HR: 100 INJECTION, SOLUTION, CONCENTRATE INTRAVENOUS at 10:35

## 2020-05-23 RX ADMIN — INSULIN LISPRO SCH UNITS: 100 INJECTION, SOLUTION INTRAVENOUS; SUBCUTANEOUS at 17:53

## 2020-05-23 RX ADMIN — ONDANSETRON PRN MG: 2 INJECTION INTRAMUSCULAR; INTRAVENOUS at 04:21

## 2020-05-23 NOTE — PDOC
PROGRESS NOTES


Chief Complaint


Chief Complaint


Acute hypoxic Respiratory failure requiring mechanical ventilation (now 

extubated for several days but still with tracheostomy)


Tracheostomy


bilateral pleural effusions/pulm edema 


Sepsis


Severe Acute gallstone pancreatitis (not a surgical candidate at this time) with

necrosis


Acute kidney failure now requiring dialysis


Salpingitis


Gallstones (Calculus of gallbladder with acute cholecystitis without 

obstruction)


HTN 


Leukocytosis 


Hypoxia


Uterine fibroid


Intractable pain


Intractable nausea


Covid 19 negative. 


Acute on chronic anemia 


EEG: No seizure activityFever  - better currently - intermittent could be from 

underlying pancreatitis blood cults 5/4 - neg so far


? Ileus with vomiting


Abd distention - U/S and CT reviewed s/p 0.4 L of opaque, debris-containing 

ascites was removed 5/6


Acute pancreatitis with persistent necrosis


- 4/27 status post ROBERT drain placement + C paropsilosis. s/p additional drains 

5/8


Anemia - S/p PRBCs


Cholelithiasis with thickening of the gallbladder wall.


Leucocytosis improving


JED, hyperkalemia, Metabolic acidosis off dialysis


Acute hypoxic resp failure ,bilateral pleural effusion and atelectasis


hypocalcemia 


Prediabetes


HTN


s/p trach


ESRD on HD


Hyperglycemia





History of Present Illness


History of Present Illness


5/23,  anxiety is up today,   she dislikes the valve still,    


shower and outside today


.   





5/22 she doesnt want to wear her passy-kristan valve,  discussed str and plan with 

her.


speech following,  needs swallow study,   but needs to wear her valve longer,  


cont current


able to walk some, walker 





5/21  stronger,   better,   


we discussed better oral care


she would like to try swallow study,  wants to try to eat,    speech is 

following








5/20/2020 


She remains in the ICU


sitting up and working with OT,   getting better


if limit pain meds, may do better off the vent, 





Nurses trying to suction her,  that is also improved, 


Chart reviewed


 








5/19/2020


Patient seen and examined in the ICU


She had an episode yesterday of tachycardia and severe agitation we gave her 

some Ativan


After that she seemed to have stroke symptoms but now that the Ativan has wore 

off her stroke symptoms have resolved


 


 


She is on IV meropenem and daptomycin and micafungin


Chart reviewed


Discussed with RN


Patient is still critically ill


 


BRIEF OPERATIVE NOTE


Pre-Op Diagnosis


Pancreatitis with pseudocysts, suspected infection


Post-Op Diagnosis


same


Procedure Performed


CT abdominal Drains x 3


Surgeon


Hardy


Anesthesia Type:  Conscious Sedation


Findings


3 abdominal drains, 14F, with turbid pancreatic fluid and necrotic debris in 

each.


Complications


No immediate








5/9: Patient today somewhat restless and having bilious secretions from ET tube,

imaging studies ordered, discussed with consultant. Pretty poor prognosis, 

hopefully is not a fistula, poor surgical candidate. 





5/10: Imaging with no acute events, she seems more stable today compared to 

yesterday. Encouraged as much activity as possible patient at high risk for 

severe depression.





Vitals


Vitals





Vital Signs








  Date Time  Temp Pulse Resp B/P (MAP) Pulse Ox O2 Delivery O2 Flow Rate FiO2


 


5/23/20 12:00      Trach Collar 8.0 


 


5/23/20 11:04   21  98   


 


5/23/20 11:00  108  100/63 (75)    


 


5/23/20 08:00 98.5       





 98.5       











Physical Exam


Physical Exam


GENERAL: Propped up in bed, arouses to name 


HEENT:  oral cavity dry, NGT


NECK:  Trach shield


LUNGS: Clear anteriorly, no accessory muscle use 


HEART:  S1, S2, regular 


ABDOMEN: mod distention, hypoactive BS, tender, + drains x 3


: [place] (4/14)


EXTREMITIES: Generalized edema, improving, no cyanosis, SCDs bilaterally 


SKIN: Warm and dry.  No generalized rash.  


CNS: Very weak 


RUE-PICC (4/30) clean


General:  Alert, Cooperative, No acute distress


Heart:  Regular rate, Normal S1, Normal S2, No murmurs, Gallops


Lungs:  Wheezing, Other (decrease bs)


Abdomen:  Soft, No tenderness


Extremities:  No clubbing, No cyanosis, No edema, Normal pulses, No 

tenderness/swelling


Skin:  Other (warm, dry)





Labs


LABS





Laboratory Tests








Test


 5/22/20


18:00 5/23/20


00:29 5/23/20


06:06


 


Glucose (Fingerstick)


 155 mg/dL


(70-99) 144 mg/dL


(70-99) 163 mg/dL


(70-99)











Assessment and Plan


Assessmemt and Plan


Problems


Medical Problems:


(1) Acute pancreatitis


Status: Acute  





(2) Cholelithiasis


Status: Acute  











Comment


Review of Relevant


I have reviewed the following items josy (where applicable) has been applied.


Labs





Laboratory Tests








Test


 5/22/20


00:39 5/22/20


06:04 5/22/20


08:40 5/22/20


11:25


 


Glucose (Fingerstick)


 149 mg/dL


(70-99) 164 mg/dL


(70-99) 


 157 mg/dL


(70-99)


 


White Blood Count


 


 


 8.8 x10^3/uL


(4.0-11.0) 





 


Red Blood Count


 


 


 2.77 x10^6/uL


(3.50-5.40) 





 


Hemoglobin


 


 


 7.9 g/dL


(12.0-15.5) 





 


Hematocrit


 


 


 24.4 %


(36.0-47.0) 





 


Mean Corpuscular Volume   88 fL ()  


 


Mean Corpuscular Hemoglobin   29 pg (25-35)  


 


Mean Corpuscular Hemoglobin


Concent 


 


 33 g/dL


(31-37) 





 


Red Cell Distribution Width


 


 


 18.7 %


(11.5-14.5) 





 


Platelet Count


 


 


 348 x10^3/uL


(140-400) 





 


Sodium Level


 


 


 140 mmol/L


(136-145) 





 


Potassium Level


 


 


 4.3 mmol/L


(3.5-5.1) 





 


Chloride Level


 


 


 99 mmol/L


() 





 


Carbon Dioxide Level


 


 


 36 mmol/L


(21-32) 





 


Anion Gap   5 (6-14)  


 


Blood Urea Nitrogen


 


 


 22 mg/dL


(7-20) 





 


Creatinine


 


 


 0.6 mg/dL


(0.6-1.0) 





 


Estimated GFR


(Cockcroft-Gault) 


 


 106.3 


 





 


Glucose Level


 


 


 126 mg/dL


(70-99) 





 


Calcium Level


 


 


 9.3 mg/dL


(8.5-10.1) 





 


Phosphorus Level


 


 


 3.7 mg/dL


(2.6-4.7) 





 


Magnesium Level


 


 


 2.1 mg/dL


(1.8-2.4) 





 


Test


 5/22/20


18:00 5/23/20


00:29 5/23/20


06:06 





 


Glucose (Fingerstick)


 155 mg/dL


(70-99) 144 mg/dL


(70-99) 163 mg/dL


(70-99) 











Laboratory Tests








Test


 5/22/20


18:00 5/23/20


00:29 5/23/20


06:06


 


Glucose (Fingerstick)


 155 mg/dL


(70-99) 144 mg/dL


(70-99) 163 mg/dL


(70-99)








Microbiology


5/17/20 Blood Culture - Final, Complete


          NO GROWTH AFTER 5 DAYS


5/6/20 Fungal Culture - Final, Complete


         


5/6/20 Fungal Culture Result 1 - Final, Complete


         


4/30/20 Aerobic Culture - Final, Complete


          


4/30/20 Aerobic Culture Result 1 (ANSON) - Final, Complete


          


4/30/20 Gram Stain - Final, Complete


          


4/30/20 Gram Stain Result 1 (ANSON) - Final, Complete


          


4/30/20 Gram Stain Result 2 (ANSON) - Final, Complete


          


4/12/20 Urine Culture - Final, Complete


          


4/12/20 Urine Culture Result 1 (ANSON) - Final, Complete


Medications





Current Medications


Sodium Chloride 1,000 ml @  1,000 mls/hr Q1H IV  Last administered on 3/16/20at 

03:00;  Start 3/16/20 at 03:00;  Stop 3/16/20 at 03:59;  Status DC


Ondansetron HCl (Zofran) 4 mg 1X  ONCE IVP  Last administered on 3/16/20at 

03:27;  Start 3/16/20 at 03:00;  Stop 3/16/20 at 03:01;  Status DC


Morphine Sulfate (Morphine Sulfate) 4 mg 1X  ONCE IV ;  Start 3/16/20 at 03:00; 

Stop 3/16/20 at 03:01;  Status Cancel


Ketorolac Tromethamine (Toradol 30mg Vial) 30 mg 1X  ONCE IV  Last administered 

on 3/16/20at 02:54;  Start 3/16/20 at 03:00;  Stop 3/16/20 at 03:01;  Status DC


Fentanyl Citrate (Fentanyl 2ml Vial) 25 mcg 1X  ONCE IVP  Last administered on 

3/16/20at 03:23;  Start 3/16/20 at 03:30;  Stop 3/16/20 at 03:31;  Status DC


Fentanyl Citrate (Fentanyl 2ml Vial) 100 mcg STK-MED ONCE .ROUTE ;  Start 

3/16/20 at 03:18;  Stop 3/16/20 at 03:18;  Status DC


Iohexol (Omnipaque 350 Mg/ml) 90 ml 1X  ONCE IV  Last administered on 3/16/20at 

03:25;  Start 3/16/20 at 03:30;  Stop 3/16/20 at 03:31;  Status DC


Info (CONTRAST GIVEN -- Rx MONITORING) 1 each PRN DAILY  PRN MC SEE COMMENTS;  

Start 3/16/20 at 03:30;  Stop 3/18/20 at 03:29;  Status DC


Hydromorphone HCl (Dilaudid) 0.5 mg 1X  ONCE IV  Last administered on 3/16/20at 

03:55;  Start 3/16/20 at 04:30;  Stop 3/16/20 at 04:32;  Status DC


Ondansetron HCl (Zofran) 4 mg PRN Q8HRS  PRN IV NAUSEA/VOMITING 1ST CHOICE;  S

tart 3/16/20 at 05:00;  Stop 3/16/20 at 09:27;  Status DC


Morphine Sulfate (Morphine Sulfate) 2 mg PRN Q2HR  PRN IV SEVERE PAIN 7-10 Last 

administered on 3/17/20at 12:26;  Start 3/16/20 at 05:00;  Stop 3/17/20 at 

14:15;  Status DC


Sodium Chloride 1,000 ml @  125 mls/hr Q8H IV  Last administered on 3/16/20at 

20:56;  Start 3/16/20 at 05:00;  Stop 3/17/20 at 04:59;  Status DC


Hydromorphone HCl (Dilaudid) 0.5 mg PRN Q3HRS  PRN IV SEVERE PAIN 7-10 Last 

administered on 3/17/20at 10:06;  Start 3/16/20 at 05:00;  Stop 3/17/20 at 

12:01;  Status DC


Piperacillin Sod/ Tazobactam Sod 4.5 gm/Sodium Chloride 100 ml @  200 mls/hr 1X 

ONCE IV  Last administered on 3/16/20at 05:44;  Start 3/16/20 at 06:00;  Stop 

3/16/20 at 06:29;  Status DC


Ondansetron HCl (Zofran) 4 mg PRN Q4HRS  PRN IV NAUSEA/VOMITING 1ST CHOICE Last 

administered on 5/23/20at 04:21;  Start 3/16/20 at 09:30


Insulin Human Lispro (HumaLOG) 0-9 UNITS Q6HRS SQ  Last administered on 

5/23/20at 06:08;  Start 3/16/20 at 09:30


Dextrose (Dextrose 50%-Water Syringe) 12.5 gm PRN Q15MIN  PRN IV SEE COMMENTS;  

Start 3/16/20 at 09:30


Pantoprazole Sodium (PROTONIX VIAL for IV PUSH) 40 mg DAILYAC IVP  Last 

administered on 5/23/20at 10:33;  Start 3/16/20 at 11:30


Prochlorperazine Edisylate (Compazine) 10 mg PRN Q6HRS  PRN IV NAUSEA/VOMITING, 

2nd CHOICE Last administered on 5/14/20at 06:11;  Start 3/16/20 at 17:45


Atenolol (Tenormin) 100 mg DAILY PO ;  Start 3/17/20 at 09:00;  Stop 3/16/20 at 

20:08;  Status DC


Metoprolol Tartrate (Lopressor Vial) 2.5 mg Q6HRS IVP  Last administered on 

3/17/20at 05:51;  Start 3/16/20 at 20:15;  Stop 3/17/20 at 10:02;  Status DC


Metoprolol Tartrate (Lopressor Vial) 5 mg Q6HRS IVP  Last administered on 

3/26/20at 00:12;  Start 3/17/20 at 10:15;  Stop 3/28/20 at 08:48;  Status DC


Hydromorphone HCl (Dilaudid) 1 mg PRN Q3HRS  PRN IV SEVERE PAIN 7-10 Last 

administered on 3/23/20at 05:13;  Start 3/17/20 at 12:00;  Stop 3/31/20 at 0

0:25;  Status DC


Lidocaine HCl (Buffered Lidocaine 1%) 3 ml STK-MED ONCE .ROUTE ;  Start 3/17/20 

at 12:55;  Stop 3/17/20 at 12:56;  Status DC


Albumin Human 500 ml @  125 mls/hr 1X  ONCE IV  Last administered on 3/17/20at 

14:33;  Start 3/17/20 at 14:30;  Stop 3/17/20 at 18:32;  Status DC


Norepinephrine Bitartrate 8 mg/ Dextrose 258 ml @  17.299 mls/ hr CONT  PRN IV 

PER PROTOCOL Last administered on 4/14/20at 12:48;  Start 3/17/20 at 15:30;  

Stop 4/17/20 at 09:19;  Status DC


Sodium Chloride 1,000 ml @  125 mls/hr Q8H IV  Last administered on 3/17/20at 

21:04;  Start 3/17/20 at 16:00;  Stop 3/18/20 at 02:42;  Status DC


Albumin Human 500 ml @  125 mls/hr PRN BID  PRN IV After every 2L NSS & BP < 

90mm Last administered on 4/1/20at 14:21;  Start 3/17/20 at 16:00


Iohexol (Omnipaque 300 Mg/ml) 60 ml 1X  ONCE IV  Last administered on 3/17/20at 

17:20;  Start 3/17/20 at 17:00;  Stop 3/17/20 at 17:01;  Status DC


Info (CONTRAST GIVEN -- Rx MONITORING) 1 each PRN DAILY  PRN MC SEE COMMENTS;  

Start 3/17/20 at 17:00;  Stop 3/19/20 at 16:59;  Status DC


Meropenem 1 gm/ Sodium Chloride 100 ml @  200 mls/hr Q8HRS IV  Last administered

on 3/18/20at 05:45;  Start 3/17/20 at 20:00;  Stop 3/18/20 at 08:48;  Status DC


Furosemide (Lasix) 40 mg 1X  ONCE IVP  Last administered on 3/17/20at 22:12;  

Start 3/17/20 at 22:30;  Stop 3/17/20 at 22:31;  Status DC


Calcium Chloride 1000 mg/Sodium Chloride 110 ml @  220 mls/hr 1X  ONCE IV  Last 

administered on 3/17/20at 22:11;  Start 3/17/20 at 22:30;  Stop 3/17/20 at 

22:59;  Status DC


Albuterol Sulfate (Ventolin Neb Soln) 2.5 mg 1X  ONCE NEB  Last administered on 

3/18/20at 00:56;  Start 3/17/20 at 22:30;  Stop 3/17/20 at 22:31;  Status DC


Insulin Human Regular (HumuLIN R VIAL) 5 unit 1X  ONCE IV  Last administered on 

3/17/20at 22:14;  Start 3/17/20 at 22:30;  Stop 3/17/20 at 22:31;  Status DC


Magnesium Sulfate 50 ml @ 25 mls/hr 1X  ONCE IV  Last administered on 3/18/20at 

02:57;  Start 3/18/20 at 03:00;  Stop 3/18/20 at 04:59;  Status DC


Calcium Gluconate 1000 mg/Sodium Chloride 110 ml @  220 mls/hr 1X  ONCE IV  Last

administered on 3/18/20at 02:46;  Start 3/18/20 at 03:00;  Stop 3/18/20 at 

03:29;  Status DC


Sodium Chloride 1,000 ml @  200 mls/hr Q5H IV  Last administered on 3/18/20at 

02:46;  Start 3/18/20 at 03:00;  Stop 3/18/20 at 10:21;  Status DC


Calcium Gluconate 1000 mg/Sodium Chloride 110 ml @  220 mls/hr 1X  ONCE IV  Last

administered on 3/18/20at 03:21;  Start 3/18/20 at 03:30;  Stop 3/18/20 at 

03:59;  Status DC


Sodium Bicarbonate 50 meq/Sodium Chloride 1,050 ml @  75 mls/hr Q14H IV  Last 

administered on 3/22/20at 21:10;  Start 3/18/20 at 07:30;  Stop 3/23/20 at 

10:28;  Status DC


Calcium Gluconate 2000 mg/Sodium Chloride 120 ml @  220 mls/hr 1X  ONCE IV  Last

administered on 3/18/20at 09:05;  Start 3/18/20 at 07:30;  Stop 3/18/20 at 

08:02;  Status DC


Lidocaine HCl (Xylocaine-Mpf 1% 2ml Vial) 2 ml STK-MED ONCE .ROUTE ;  Start 

3/18/20 at 08:47;  Stop 3/18/20 at 08:47;  Status DC


Meropenem 500 mg/ Sodium Chloride 50 ml @  100 mls/hr Q12HR IV  Last 

administered on 3/23/20at 21:01;  Start 3/18/20 at 18:00;  Stop 3/24/20 at 

07:58;  Status DC


Lidocaine HCl (Buffered Lidocaine 1%) 3 ml STK-MED ONCE .ROUTE ;  Start 3/18/20 

at 09:46;  Stop 3/18/20 at 09:46;  Status DC


Lidocaine HCl (Buffered Lidocaine 1%) 6 ml 1X  ONCE INJ  Last administered on 

3/18/20at 10:26;  Start 3/18/20 at 10:15;  Stop 3/18/20 at 10:16;  Status DC


Info (Tpn Per Pharmacy) 1 each PRN DAILY  PRN MC SEE COMMENTS Last administered 

on 5/22/20at 12:07;  Start 3/18/20 at 12:00


Sodium Chloride 1,000 ml @  1,000 mls/hr Q1H PRN IV hypotension;  Start 3/18/20 

at 12:07;  Stop 3/18/20 at 18:06;  Status DC


Diphenhydramine HCl (Benadryl) 25 mg 1X PRN  PRN IV ITCHING;  Start 3/18/20 at 

12:15;  Stop 3/19/20 at 12:14;  Status DC


Diphenhydramine HCl (Benadryl) 25 mg 1X PRN  PRN IV ITCHING;  Start 3/18/20 at 

12:15;  Stop 3/19/20 at 12:14;  Status DC


Sodium Chloride 1,000 ml @  400 mls/hr Q2H30M PRN IV PATENCY;  Start 3/18/20 at 

12:07;  Stop 3/19/20 at 00:06;  Status DC


Info (PHARMACY MONITORING -- do not chart) 1 each PRN DAILY  PRN MC SEE 

COMMENTS;  Start 3/18/20 at 12:15;  Stop 3/20/20 at 08:13;  Status DC


Sodium Chloride 90 meq/Calcium Gluconate 10 meq/ Multivitamins 10 ml/Chromium/ 

Copper/Manganese/ Seleni/Zn 1 ml/ Total Parenteral Nutrition/Amino 

Acids/Dextrose/ Fat Emulsion Intravenous 55.005 ml  @ 2.292 mls/hr TPN  CONT IV 

;  Start 3/18/20 at 22:00;  Stop 3/18/20 at 12:33;  Status DC


Info (Tpn Per Pharmacy) 1 each PRN DAILY  PRN MC SEE COMMENTS;  Start 3/18/20 at

12:30;  Status UNV


Sodium Chloride 90 meq/Calcium Gluconate 10 meq/ Multivitamins 10 ml/Chromium/ 

Copper/Manganese/ Seleni/Zn 0.5 ml/ Total Parenteral Nutrition/Amino 

Acids/Dextrose/ Fat Emulsion Intravenous 1,512 ml @  63 mls/hr TPN  CONT IV  

Last administered on 3/18/20at 22:06;  Start 3/18/20 at 22:00;  Stop 3/19/20 at 

21:59;  Status DC


Calcium Carbonate/ Glycine (Tums) 500 mg PRN AFTMEALHC  PRN PO INDIGESTION;  

Start 3/18/20 at 17:45;  Stop 5/13/20 at 10:25;  Status DC


Calcium Gluconate (Calcium Gluconate) 2,000 mg 1X  ONCE IVP  Last administered 

on 3/19/20at 02:19;  Start 3/19/20 at 02:15;  Stop 3/19/20 at 02:16;  Status DC


Calcium Chloride 3000 mg/Sodium Chloride 1,030 ml @  50 mls/hr [id number] IV  Last 

administered on 3/21/20at 02:17;  Start 3/19/20 at 08:00;  Stop 3/21/20 at 

15:23;  Status DC


Lorazepam (Ativan Inj) 1 mg PRN Q4HRS  PRN IVP ANXIETY / AGITATION, 2nd choic 

Last administered on 4/17/20at 03:51;  Start 3/19/20 at 09:00;  Stop 4/17/20 at 

09:19;  Status DC


Sodium Chloride 1,000 ml @  1,000 mls/hr Q1H PRN IV hypotension;  Start 3/19/20 

at 08:56;  Stop 3/19/20 at 14:55;  Status DC


Albumin Human 200 ml @  200 mls/hr 1X PRN  PRN IV Hypotension;  Start 3/19/20 at

09:00;  Stop 3/19/20 at 14:59;  Status DC


Diphenhydramine HCl (Benadryl) 25 mg 1X PRN  PRN IV ITCHING;  Start 3/19/20 at 

09:00;  Stop 3/20/20 at 08:59;  Status DC


Diphenhydramine HCl (Benadryl) 25 mg 1X PRN  PRN IV ITCHING;  Start 3/19/20 at 

09:00;  Stop 3/20/20 at 08:59;  Status DC


Sodium Chloride 1,000 ml @  400 mls/hr Q2H30M PRN IV PATENCY;  Start 3/19/20 at 

08:56;  Stop 3/19/20 at 20:55;  Status DC


Info (PHARMACY MONITORING -- do not chart) 1 each PRN DAILY  PRN MC SEE 

COMMENTS;  Start 3/19/20 at 09:00;  Status UNV


Info (PHARMACY MONITORING -- do not chart) 1 each PRN DAILY  PRN MC SEE 

COMMENTS;  Start 3/19/20 at 09:00;  Stop 3/20/20 at 08:13;  Status DC


Digoxin (Lanoxin) 500 mcg 1X  ONCE IV  Last administered on 3/19/20at 10:04;  

Start 3/19/20 at 10:00;  Stop 3/19/20 at 10:01;  Status DC


Digoxin (Lanoxin) 125 mcg 1X  ONCE IV  Last administered on 3/19/20at 17:10;  

Start 3/19/20 at 18:00;  Stop 3/19/20 at 18:01;  Status DC


Magnesium Sulfate 100 ml @  25 mls/hr 1X  ONCE IV  Last administered on 

3/19/20at 12:48;  Start 3/19/20 at 13:00;  Stop 3/19/20 at 16:59;  Status DC


Sodium Chloride 90 meq/Magnesium Sulfate 10 meq/ Calcium Gluconate 20 meq/ 

Multivitamins 10 ml/Chromium/ Copper/Manganese/ Seleni/Zn 0.5 ml/ Total 

Parenteral Nutrition/Amino Acids/Dextrose/ Fat Emulsion Intravenous 1,512 ml @  

63 mls/hr TPN  CONT IV  Last administered on 3/19/20at 22:25;  Start 3/19/20 at 

22:00;  Stop 3/20/20 at 21:59;  Status DC


Sodium Chloride 1,000 ml @  1,000 mls/hr Q1H PRN IV hypotension;  Start 3/20/20 

at 08:05;  Stop 3/20/20 at 14:04;  Status DC


Albumin Human 200 ml @  200 mls/hr 1X  ONCE IV  Last administered on 3/20/20at 

08:57;  Start 3/20/20 at 08:15;  Stop 3/20/20 at 09:14;  Status DC


Diphenhydramine HCl (Benadryl) 25 mg 1X PRN  PRN IV ITCHING;  Start 3/20/20 at 

08:15;  Stop 3/21/20 at 08:14;  Status DC


Diphenhydramine HCl (Benadryl) 25 mg 1X PRN  PRN IV ITCHING;  Start 3/20/20 at 

08:15;  Stop 3/21/20 at 08:14;  Status DC


Sodium Chloride 1,000 ml @  400 mls/hr Q2H30M PRN IV PATENCY;  Start 3/20/20 at 

08:05;  Stop 3/20/20 at 20:04;  Status DC


Info (PHARMACY MONITORING -- do not chart) 1 each PRN DAILY  PRN MC SEE 

COMMENTS;  Start 3/20/20 at 08:15;  Stop 3/24/20 at 07:57;  Status DC


Sodium Chloride 90 meq/Potassium Chloride 15 meq/ Potassium Phosphate 10 mmol/ 

Magnesium Sulfate 10 meq/Calcium Gluconate 20 meq/ Multivitamins 10 ml/Chromium/

Copper/Manganese/ Seleni/Zn 0.5 ml/ Total Parenteral Nutrition/Amino 

Acids/Dextrose/ Fat Emulsion Intravenous 1,512 ml @  63 mls/hr TPN  CONT IV  

Last administered on 3/20/20at 21:01;  Start 3/20/20 at 22:00;  Stop 3/21/20 at 

21:59;  Status DC


Potassium Chloride/Water 100 ml @  100 mls/hr 1X  ONCE IV  Last administered on 

3/20/20at 14:09;  Start 3/20/20 at 14:00;  Stop 3/20/20 at 14:59;  Status DC


Benzocaine (Hurricaine One) 1 spray 1X  ONCE MM  Last administered on 3/20/20at 

16:38;  Start 3/20/20 at 14:30;  Stop 3/20/20 at 14:31;  Status DC


Lidocaine HCl (Glydo (Lidocaine) Jelly) 1 thomas 1X  ONCE MM  Last administered on 

3/20/20at 16:38;  Start 3/20/20 at 14:30;  Stop 3/20/20 at 14:31;  Status DC


Linezolid/Dextrose 300 ml @  300 mls/hr Q12HR IV  Last administered on 3/26/20at

21:04;  Start 3/20/20 at 20:00;  Stop 3/27/20 at 07:50;  Status DC


Acetaminophen (Tylenol) 650 mg PRN Q6HRS  PRN PO MILD PAIN / TEMP;  Start 

3/21/20 at 03:30;  Stop 3/21/20 at 03:36;  Status DC


Acetaminophen (Tylenol) 650 mg PRN Q6HRS  PRN PEG MILD PAIN / TEMP Last 

administered on 4/16/20at 19:56;  Start 3/21/20 at 03:36;  Stop 5/13/20 at 10:25

;  Status DC


Sodium Chloride 1,000 ml @  1,000 mls/hr Q1H PRN IV hypotension;  Start 3/21/20 

at 07:50;  Stop 3/21/20 at 13:49;  Status DC


Albumin Human 200 ml @  200 mls/hr 1X PRN  PRN IV Hypotension;  Start 3/21/20 at

08:00;  Stop 3/21/20 at 13:59;  Status DC


Sodium Chloride (Normal Saline Flush) 10 ml 1X PRN  PRN IV AP catheter pack;  

Start 3/21/20 at 08:00;  Stop 3/22/20 at 07:59;  Status DC


Sodium Chloride (Normal Saline Flush) 10 ml 1X PRN  PRN IV  catheter pack;  

Start 3/21/20 at 08:00;  Stop 3/22/20 at 07:59;  Status DC


Sodium Chloride 1,000 ml @  400 mls/hr Q2H30M PRN IV PATENCY;  Start 3/21/20 at 

07:50;  Stop 3/21/20 at 19:49;  Status DC


Info (PHARMACY MONITORING -- do not chart) 1 each PRN DAILY  PRN MC SEE 

COMMENTS;  Start 3/21/20 at 08:00;  Status UNV


Info (PHARMACY MONITORING -- do not chart) 1 each PRN DAILY  PRN MC SEE 

COMMENTS;  Start 3/21/20 at 08:00;  Stop 3/23/20 at 08:25;  Status DC


Sodium Chloride 90 meq/Potassium Chloride 15 meq/ Potassium Phosphate 10 mmol/ 

Magnesium Sulfate 10 meq/Calcium Gluconate 20 meq/ Multivitamins 10 ml/Chromium/

Copper/Manganese/ Seleni/Zn 0.5 ml/ Total Parenteral Nutrition/Amino Acids

/Dextrose/ Fat Emulsion Intravenous 1,512 ml @  63 mls/hr TPN  CONT IV  Last 

administered on 3/21/20at 20:57;  Start 3/21/20 at 22:00;  Stop 3/22/20 at 

21:59;  Status DC


Sodium Chloride 90 meq/Potassium Chloride 15 meq/ Potassium Phosphate 15 mmol/ 

Magnesium Sulfate 10 meq/Calcium Gluconate 20 meq/ Multivitamins 10 ml/Chromium/

Copper/Manganese/ Seleni/Zn 0.5 ml/ Total Parenteral Nutrition/Amino 

Acids/Dextrose/ Fat Emulsion Intravenous 1,512 ml @  63 mls/hr TPN  CONT IV ;  

Start 3/22/20 at 22:00;  Stop 3/22/20 at 14:16;  Status DC


Sodium Chloride 90 meq/Potassium Chloride 15 meq/ Potassium Phosphate 15 mmol/ 

Magnesium Sulfate 10 meq/Calcium Gluconate 20 meq/ Multivitamins 10 ml/Chromium/

Copper/Manganese/ Seleni/Zn 0.5 ml/ Total Parenteral Nutrition/Amino Ac

ids/Dextrose/ Fat Emulsion Intravenous 1,200 ml @  50 mls/hr TPN  CONT IV ;  

Start 3/22/20 at 22:00;  Stop 3/22/20 at 14:17;  Status DC


Sodium Chloride 90 meq/Potassium Chloride 15 meq/ Potassium Phosphate 10 mmol/ 

Magnesium Sulfate 10 meq/Calcium Gluconate 20 meq/ Multivitamins 10 ml/Chromium/

Copper/Manganese/ Seleni/Zn 0.5 ml/ Total Parenteral Nutrition/Amino 

Acids/Dextrose/ Fat Emulsion Intravenous 1,200 ml @  50 mls/hr TPN  CONT IV  

Last administered on 3/22/20at 23:29;  Start 3/22/20 at 22:00;  Stop 3/23/20 at 

21:59;  Status DC


Sodium Chloride 1,000 ml @  1,000 mls/hr Q1H PRN IV hypotension;  Start 3/23/20 

at 07:28;  Stop 3/23/20 at 13:27;  Status DC


Albumin Human 200 ml @  200 mls/hr 1X  ONCE IV  Last administered on 3/23/20at 

08:51;  Start 3/23/20 at 07:30;  Stop 3/23/20 at 08:29;  Status DC


Diphenhydramine HCl (Benadryl) 25 mg 1X PRN  PRN IV ITCHING;  Start 3/23/20 at 

07:30;  Stop 3/24/20 at 07:29;  Status DC


Diphenhydramine HCl (Benadryl) 25 mg 1X PRN  PRN IV ITCHING;  Start 3/23/20 at 

07:30;  Stop 3/24/20 at 07:29;  Status DC


Sodium Chloride 1,000 ml @  400 mls/hr Q2H30M PRN IV PATENCY;  Start 3/23/20 at 

07:28;  Stop 3/23/20 at 19:27;  Status DC


Info (PHARMACY MONITORING -- do not chart) 1 each PRN DAILY  PRN MC SEE 

COMMENTS;  Start 3/23/20 at 07:30;  Stop 4/3/20 at 13:01;  Status DC


Metronidazole 100 ml @  100 mls/hr Q6HRS IV  Last administered on 4/8/20at 

06:26;  Start 3/23/20 at 08:30;  Stop 4/8/20 at 09:58;  Status DC


Micafungin Sodium 100 mg/Dextrose 100 ml @  100 mls/hr Q24H IV  Last administer

ed on 4/30/20at 08:18;  Start 3/23/20 at 09:00;  Stop 4/30/20 at 20:58;  Status 

DC


Propofol 0 ml @ As Directed STK-MED ONCE IV ;  Start 3/23/20 at 07:53;  Stop 

3/23/20 at 07:53;  Status DC


Etomidate (Amidate) 20 mg STK-MED ONCE IV ;  Start 3/23/20 at 07:53;  Stop 

3/23/20 at 07:54;  Status DC


Midazolam HCl (Versed) 5 mg STK-MED ONCE .ROUTE ;  Start 3/23/20 at 07:57;  Stop

3/23/20 at 07:57;  Status DC


Fentanyl Citrate 30 ml @ 0 mls/hr CONT  PRN IV SEE PROTOCOL Last administered on

4/17/20at 06:12;  Start 3/23/20 at 08:15;  Stop 4/17/20 at 09:19;  Status DC


Artificial Tears (Artificial Tears) 1 drop PRN Q1HR  PRN OU DRY EYE, 1st choice;

 Start 3/23/20 at 08:15;  Stop 4/29/20 at 05:31;  Status DC


Midazolam HCl 50 mg/Sodium Chloride 50 ml @ 0 mls/hr CONT  PRN IV SEE PROTOCOL 

Last administered on 3/26/20at 22:39;  Start 3/23/20 at 08:15;  Stop 3/28/20 at 

15:59;  Status DC


Etomidate (Amidate) 8 mg 1X  ONCE IV  Last administered on 3/23/20at 08:33;  

Start 3/23/20 at 08:30;  Stop 3/23/20 at 08:31;  Status DC


Succinylcholine Chloride (Anectine) 120 mg 1X  ONCE IV  Last administered on 

3/23/20at 08:34;  Start 3/23/20 at 08:30;  Stop 3/23/20 at 08:31;  Status DC


Midazolam HCl (Versed) 5 mg 1X  ONCE IV ;  Start 3/23/20 at 08:30;  Stop 3/23/20

at 08:31;  Status DC


Potassium Chloride 15 meq/ Bicarbonate Dialysis Soln w/ out KCl 5,007.5 ml  @ 

1,000 mls/ hr Q5H1M IV  Last administered on 3/24/20at 11:11;  Start 3/23/20 at 

12:00;  Stop 3/24/20 at 11:15;  Status DC


Potassium Chloride 15 meq/ Bicarbonate Dialysis Soln w/ out KCl 5,007.5 ml  @ 

1,000 mls/ hr Q5H1M IV  Last administered on 3/24/20at 11:12;  Start 3/23/20 at 

12:00;  Stop 3/24/20 at 11:17;  Status DC


Potassium Chloride 15 meq/ Bicarbonate Dialysis Soln w/ out KCl 5,007.5 ml  @ 

1,000 mls/ hr Q5H1M IV  Last administered on 3/24/20at 11:11;  Start 3/23/20 at 

12:00;  Stop 3/24/20 at 11:19;  Status DC


Sodium Chloride 90 meq/Potassium Chloride 15 meq/ Potassium Phosphate 10 mmol/ 

Magnesium Sulfate 10 meq/Calcium Gluconate 20 meq/ Multivitamins 10 ml/Chromium/

Copper/Manganese/ Seleni/Zn 0.5 ml/ Total Parenteral Nutrition/Amino 

Acids/Dextrose/ Fat Emulsion Intravenous 1,400 ml @  58.333 mls/ hr TPN  CONT IV

 Last administered on 3/23/20at 21:42;  Start 3/23/20 at 22:00;  Stop 3/24/20 at

21:59;  Status DC


Heparin Sodium (Porcine) (Heparin Sodium) 5,000 unit Q8HRS SQ  Last administered

on 3/28/20at 05:55;  Start 3/23/20 at 15:00;  Stop 3/28/20 at 13:28;  Status DC


Meropenem 500 mg/ Sodium Chloride 50 ml @  100 mls/hr Q6HRS IV  Last 

administered on 3/25/20at 06:00;  Start 3/24/20 at 09:00;  Stop 3/25/20 at 

07:29;  Status DC


Potassium Phosphate 20 mmol/ Sodium Chloride 106.6667 ml @  51.667 m... 1X  ONCE

IV  Last administered on 3/24/20at 11:22;  Start 3/24/20 at 10:15;  Stop 3/24/20

at 12:18;  Status DC


Acetaminophen (Tylenol Supp) 650 mg PRN Q6HRS  PRN [place] MILD PAIN / TEMP > 100.3'F

Last administered on 5/5/20at 09:12;  Start 3/24/20 at 10:30


Potassium Chloride/Water 100 ml @  100 mls/hr Q1H IV  Last administered on 

3/24/20at 12:12;  Start 3/24/20 at 11:00;  Stop 3/24/20 at 12:59;  Status DC


Potassium Chloride 20 meq/ Bicarbonate Dialysis Soln w/ out KCl 5,010 ml @  

1,000 mls/hr Q5H1M IV  Last administered on 3/25/20at 08:48;  Start 3/24/20 at 

12:00;  Stop 3/25/20 at 13:03;  Status DC


Potassium Chloride 20 meq/ Bicarbonate Dialysis Soln w/ out KCl 5,010 ml @  

1,000 mls/hr Q5H1M IV  Last administered on 3/29/20at 14:52;  Start 3/24/20 at 

11:30;  Stop 3/29/20 at 19:59;  Status DC


Potassium Chloride 20 meq/ Bicarbonate Dialysis Soln w/ out KCl 5,010 ml @  

1,000 mls/hr Q5H1M IV  Last administered on 3/29/20at 14:53;  Start 3/24/20 at 

11:30;  Stop 3/29/20 at 19:59;  Status DC


Sodium Chloride 90 meq/Potassium Chloride 15 meq/ Potassium Phosphate 15 mmol/ 

Magnesium Sulfate 10 meq/Calcium Gluconate 15 meq/ Multivitamins 10 ml/Chromium/

Copper/Manganese/ Seleni/Zn 0.5 ml/ Total Parenteral Nutrition/Amino 

Acids/Dextrose/ Fat Emulsion Intravenous 1,400 ml @  58.333 mls/ hr TPN  CONT IV

 Last administered on 3/24/20at 22:17;  Start 3/24/20 at 22:00;  Stop 3/25/20 at

21:59;  Status DC


Cefepime HCl (Maxipime) 2 gm Q12HR IVP  Last administered on 4/7/20at 20:56;  

Start 3/25/20 at 09:00;  Stop 4/8/20 at 09:58;  Status DC


Daptomycin 500 mg/ Sodium Chloride 50 ml @  100 mls/hr Q48H IV  Last 

administered on 4/10/20at 09:57;  Start 3/25/20 at 08:30;  Stop 4/10/20 at 

10:07;  Status DC


Lidocaine HCl (Buffered Lidocaine 1%) 3 ml 1X  ONCE INJ  Last administered on 

3/25/20at 10:27;  Start 3/25/20 at 10:30;  Stop 3/25/20 at 10:31;  Status DC


Potassium Phosphate 20 mmol/ Sodium Chloride 106.6667 ml @  51.667 m... 1X  ONCE

IV  Last administered on 3/25/20at 12:51;  Start 3/25/20 at 13:00;  Stop 3/25/20

at 15:03;  Status DC


Sodium Chloride 90 meq/Potassium Chloride 15 meq/ Potassium Phosphate 18 mmol/ 

Magnesium Sulfate 8 meq/Calcium Gluconate 15 meq/ Multivitamins 10 ml/Chromium/ 

Copper/Manganese/ Seleni/Zn 0.5 ml/ Total Parenteral Nutrition/Amino 

Acids/Dextrose/ Fat Emulsion Intravenous 1,400 ml @  58.333 mls/ hr TPN  CONT IV

 Last administered on 3/25/20at 22:16;  Start 3/25/20 at 22:00;  Stop 3/26/20 at

21:59;  Status DC


Potassium Chloride 20 meq/ Bicarbonate Dialysis Soln w/ out KCl 5,010 ml @  

1,000 mls/hr Q5H1M IV  Last administered on 3/29/20at 14:54;  Start 3/25/20 at 

16:00;  Stop 3/29/20 at 19:59;  Status DC


Multi-Ingred Cream/Lotion/Oil/ Oint (Artificial Tears Eye Ointment) 1 thomas PRN 

Q1HR  PRN OU DRY EYE, 2nd choice Last administered on 4/13/20at 08:19;  Start 

3/25/20 at 17:30


Sodium Chloride 90 meq/Potassium Chloride 15 meq/ Potassium Phosphate 18 mmol/ 

Magnesium Sulfate 8 meq/Calcium Gluconate 15 meq/ Multivitamins 10 ml/Chromium/ 

Copper/Manganese/ Seleni/Zn 0.5 ml/ Total Parenteral Nutrition/Amino 

Acids/Dextrose/ Fat Emulsion Intravenous 1,400 ml @  58.333 mls/ hr TPN  CONT IV

 Last administered on 3/26/20at 22:00;  Start 3/26/20 at 22:00;  Stop 3/27/20 at

21:59;  Status DC


Albumin Human 500 ml @  125 mls/hr 1X  ONCE IV ;  Start 3/26/20 at 14:15;  Stop 

3/26/20 at 18:14;  Status DC


Sodium Chloride 90 meq/Potassium Chloride 15 meq/ Potassium Phosphate 18 mmol/ 

Magnesium Sulfate 8 meq/Calcium Gluconate 15 meq/ Multivitamins 10 ml/Chromium/ 

Copper/Manganese/ Seleni/Zn 0.5 ml/ Insulin Human Regular 10 unit/ Total 

Parenteral Nutrition/Amino Acids/Dextrose/ Fat Emulsion Intravenous 1,400 ml @  

58.333 mls/ hr TPN  CONT IV  Last administered on 3/27/20at 21:43;  Start 3/2

7/20 at 22:00;  Stop 3/28/20 at 21:59;  Status DC


Lidocaine HCl (Buffered Lidocaine 1%) 3 ml STK-MED ONCE .ROUTE ;  Start 3/25/20 

at 10:00;  Stop 3/27/20 at 13:57;  Status DC


Midazolam HCl 100 mg/Sodium Chloride 100 ml @ 7 mls/hr CONT  PRN IV SEE PROTOCOL

Last administered on 4/8/20at 15:35;  Start 3/28/20 at 16:00


Sodium Chloride 90 meq/Potassium Chloride 15 meq/ Potassium Phosphate 18 mmol/ 

Magnesium Sulfate 8 meq/Calcium Gluconate 15 meq/ Multivitamins 10 ml/Chromium/ 

Copper/Manganese/ Seleni/Zn 0.5 ml/ Insulin Human Regular 15 unit/ Total 

Parenteral Nutrition/Amino Acids/Dextrose/ Fat Emulsion Intravenous 1,400 ml @  

58.333 mls/ hr TPN  CONT IV  Last administered on 3/28/20at 20:34;  Start 

3/28/20 at 22:00;  Stop 3/29/20 at 21:59;  Status DC


Info ([facility] Electrolyte Protocol) 1 ea CONT PRN  PRN MC PER PROTOCOL;  Start 

3/29/20 at 13:15


Sodium Chloride 90 meq/Potassium Chloride 15 meq/ Potassium Phosphate 18 mmol/ 

Magnesium Sulfate 8 meq/Calcium Gluconate 15 meq/ Multivitamins 10 ml/Chromium/ 

Copper/Manganese/ Seleni/Zn 0.5 ml/ Insulin Human Regular 15 unit/ Total 

Parenteral Nutrition/Amino Acids/Dextrose/ Fat Emulsion Intravenous 1,400 ml @  

58.333 mls/ hr TPN  CONT IV  Last administered on 3/29/20at 22:05;  Start 

3/29/20 at 22:00;  Stop 3/30/20 at 21:59;  Status DC


Potassium Chloride 15 meq/ Bicarbonate Dialysis Soln w/ out KCl 5,007.5 ml  @ 

1,000 mls/ hr Q5H1M IV  Last administered on 4/1/20at 18:14;  Start 3/29/20 at 

20:00;  Stop 4/2/20 at 13:08;  Status DC


Potassium Chloride 15 meq/ Bicarbonate Dialysis Soln w/ out KCl 5,007.5 ml  @ 

1,000 mls/ hr Q5H1M IV  Last administered on 4/1/20at 18:14;  Start 3/29/20 at 

20:00;  Stop 4/2/20 at 13:08;  Status DC


Potassium Chloride 15 meq/ Bicarbonate Dialysis Soln w/ out KCl 5,007.5 ml  @ 

1,000 mls/ hr Q5H1M IV  Last administered on 4/1/20at 18:14;  Start 3/29/20 at 

20:00;  Stop 4/2/20 at 13:08;  Status DC


Iohexol (Omnipaque 240 Mg/ml) 30 ml 1X  ONCE PO  Last administered on 3/30/20at 

11:30;  Start 3/30/20 at 11:30;  Stop 3/30/20 at 11:33;  Status DC


Info (CONTRAST GIVEN -- Rx MONITORING) 1 each PRN DAILY  PRN MC SEE COMMENTS;  

Start 3/30/20 at 11:45;  Stop 4/1/20 at 11:44;  Status DC


Sodium Chloride 90 meq/Potassium Chloride 15 meq/ Potassium Phosphate 18 mmol/ 

Magnesium Sulfate 8 meq/Calcium Gluconate 15 meq/ Multivitamins 10 ml/Chromium/ 

Copper/Manganese/ Seleni/Zn 0.5 ml/ Insulin Human Regular 15 unit/ Total 

Parenteral Nutrition/Amino Acids/Dextrose/ Fat Emulsion Intravenous 1,400 ml @  

58.333 mls/ hr TPN  CONT IV  Last administered on 3/30/20at 21:47;  Start 

3/30/20 at 22:00;  Stop 3/31/20 at 21:59;  Status DC


Sodium Chloride 90 meq/Potassium Chloride 15 meq/ Potassium Phosphate 18 mmol/ 

Magnesium Sulfate 8 meq/Calcium Gluconate 15 meq/ Multivitamins 10 ml/Chromium/ 

Copper/Manganese/ Seleni/Zn 0.5 ml/ Insulin Human Regular 20 unit/ Total 

Parenteral Nutrition/Amino Acids/Dextrose/ Fat Emulsion Intravenous 1,400 ml @  

58.333 mls/ hr TPN  CONT IV  Last administered on 3/31/20at 21:36;  Start 

3/31/20 at 22:00;  Stop 4/1/20 at 21:59;  Status DC


Alteplase, Recombinant (Cathflo For Central Catheter Clearance) 1 mg 1X  ONCE 

INT CAT  Last administered on 3/31/20at 20:03;  Start 3/31/20 at 19:30;  Stop 3/

31/20 at 19:46;  Status DC


Alteplase, Recombinant (Cathflo For Central Catheter Clearance) 1 mg 1X  ONCE 

INT CAT  Last administered on 3/31/20at 22:05;  Start 3/31/20 at 22:00;  Stop 

3/31/20 at 22:01;  Status DC


Sodium Chloride 90 meq/Potassium Chloride 15 meq/ Potassium Phosphate 18 mmol/ 

Magnesium Sulfate 8 meq/Calcium Gluconate 15 meq/ Multivitamins 10 ml/Chromium/ 

Copper/Manganese/ Seleni/Zn 0.5 ml/ Insulin Human Regular 20 unit/ Total 

Parenteral Nutrition/Amino Acids/Dextrose/ Fat Emulsion Intravenous 1,400 ml @  

58.333 mls/ hr TPN  CONT IV  Last administered on 4/1/20at 21:30;  Start 4/1/20 

at 22:00;  Stop 4/2/20 at 21:59;  Status DC


Dexmedetomidine HCl 400 mcg/ Sodium Chloride 100 ml @ 0 mls/hr CONT  PRN IV 

ANXIETY / AGITATION Last administered on 5/23/20at 10:35;  Start 4/2/20 at 08:15


Sodium Chloride 500 ml @  500 mls/hr 1X PRN  PRN IV ELEVATED BP, SEE COMMENTS;  

Start 4/2/20 at 08:15


Atropine Sulfate (ATROPINE 0.5mg SYRINGE) 0.5 mg PRN Q5MIN  PRN IV SEE COMMENTS;

 Start 4/2/20 at 08:15


Furosemide (Lasix) 20 mg 1X  ONCE IVP  Last administered on 4/2/20at 08:19;  

Start 4/2/20 at 08:15;  Stop 4/2/20 at 08:16;  Status DC


Lidocaine HCl (Buffered Lidocaine 1%) 3 ml STK-MED ONCE .ROUTE ;  Start 4/2/20 

at 08:39;  Stop 4/2/20 at 08:39;  Status DC


Lidocaine HCl (Buffered Lidocaine 1%) 6 ml 1X  ONCE INJ  Last administered on 

4/2/20at 09:05;  Start 4/2/20 at 09:00;  Stop 4/2/20 at 09:06;  Status DC


Sodium Chloride 90 meq/Potassium Chloride 15 meq/ Potassium Phosphate 18 mmol/ 

Magnesium Sulfate 8 meq/Calcium Gluconate 15 meq/ Multivitamins 10 ml/Chromium/ 

Copper/Manganese/ Seleni/Zn 0.5 ml/ Insulin Human Regular 20 unit/ Total 

Parenteral Nutrition/Amino Acids/Dextrose/ Fat Emulsion Intravenous 1,400 ml @  

58.333 mls/ hr TPN  CONT IV  Last administered on 4/2/20at 22:45;  Start 4/2/20 

at 22:00;  Stop 4/3/20 at 21:59;  Status DC


Sodium Chloride 1,000 ml @  1,000 mls/hr Q1H PRN IV hypotension;  Start 4/3/20 

at 07:30;  Stop 4/3/20 at 13:29;  Status DC


Albumin Human 200 ml @  200 mls/hr 1X PRN  PRN IV Hypotension Last administered 

on 4/3/20at 09:36;  Start 4/3/20 at 07:30;  Stop 4/3/20 at 13:29;  Status DC


Sodium Chloride (Normal Saline Flush) 10 ml 1X PRN  PRN IV AP catheter pack;  

Start 4/3/20 at 07:30;  Stop 4/3/20 at 21:29;  Status DC


Sodium Chloride (Normal Saline Flush) 10 ml 1X PRN  PRN IV  catheter pack;  

Start 4/3/20 at 07:30;  Stop 4/4/20 at 07:29;  Status DC


Sodium Chloride 1,000 ml @  400 mls/hr Q2H30M PRN IV PATENCY;  Start 4/3/20 at 

07:30;  Stop 4/3/20 at 19:29;  Status DC


Info (PHARMACY MONITORING -- do not chart) 1 each PRN DAILY  PRN MC SEE 

COMMENTS;  Start 4/3/20 at 07:30;  Stop 4/3/20 at 13:02;  Status DC


Info (PHARMACY MONITORING -- do not chart) 1 each PRN DAILY  PRN MC SEE 

COMMENTS;  Start 4/3/20 at 07:30;  Stop 4/5/20 at 12:45;  Status DC


Sodium Chloride 90 meq/Potassium Chloride 15 meq/ Potassium Phosphate 10 mmol/ 

Magnesium Sulfate 8 meq/Calcium Gluconate 15 meq/ Multivitamins 10 ml/Chromium/ 

Copper/Manganese/ Seleni/Zn 0.5 ml/ Insulin Human Regular 25 unit/ Total 

Parenteral Nutrition/Amino Acids/Dextrose/ Fat Emulsion Intravenous 1,400 ml @  

58.333 mls/ hr TPN  CONT IV  Last administered on 4/3/20at 22:19;  Start 4/3/20 

at 22:00;  Stop 4/4/20 at 21:59;  Status DC


Heparin Sodium (Porcine) (Heparin Sodium) 5,000 unit Q12HR SQ  Last administered

on 4/26/20at 08:59;  Start 4/3/20 at 21:00;  Stop 4/26/20 at 10:05;  Status DC


Ondansetron HCl (Zofran) 4 mg PRN Q6HRS  PRN IV NAUSEA/VOMITING;  Start 4/6/20 

at 07:00;  Stop 4/7/20 at 06:59;  Status DC


Fentanyl Citrate (Fentanyl 2ml Vial) 25 mcg PRN Q5MIN  PRN IV MILD PAIN 1-3;  

Start 4/6/20 at 07:00;  Stop 4/7/20 at 06:59;  Status DC


Fentanyl Citrate (Fentanyl 2ml Vial) 50 mcg PRN Q5MIN  PRN IV MODERATE TO SEVERE

PAIN;  Start 4/6/20 at 07:00;  Stop 4/7/20 at 06:59;  Status DC


Ringer's Solution 1,000 ml @  30 mls/hr Q24H IV ;  Start 4/6/20 at 07:00;  Stop 

4/6/20 at 18:59;  Status DC


Lidocaine HCl (Xylocaine-Mpf 1% 2ml Vial) 2 ml PRN 1X  PRN ID PRIOR TO IV START;

 Start 4/6/20 at 07:00;  Stop 4/7/20 at 06:59;  Status DC


Prochlorperazine Edisylate (Compazine) 5 mg PACU PRN  PRN IV NAUSEA, MRX1;  

Start 4/6/20 at 07:00;  Stop 4/7/20 at 06:59;  Status DC


Sodium Chloride 1,000 ml @  1,000 mls/hr Q1H PRN IV hypotension;  Start 4/4/20 

at 09:10;  Stop 4/4/20 at 15:09;  Status DC


Albumin Human 200 ml @  200 mls/hr 1X PRN  PRN IV Hypotension Last administered 

on 4/4/20at 10:10;  Start 4/4/20 at 09:15;  Stop 4/4/20 at 15:14;  Status DC


Sodium Chloride 1,000 ml @  400 mls/hr Q2H30M PRN IV PATENCY;  Start 4/4/20 at 

09:10;  Stop 4/4/20 at 21:09;  Status DC


Info (PHARMACY MONITORING -- do not chart) 1 each PRN DAILY  PRN MC SEE 

COMMENTS;  Start 4/4/20 at 09:15;  Stop 4/5/20 at 12:45;  Status DC


Info (PHARMACY MONITORING -- do not chart) 1 each PRN DAILY  PRN MC SEE 

COMMENTS;  Start 4/4/20 at 09:15;  Stop 4/5/20 at 12:45;  Status DC


Sodium Chloride 90 meq/Potassium Chloride 15 meq/ Potassium Phosphate 10 mmol/ 

Magnesium Sulfate 8 meq/Calcium Gluconate 15 meq/ Multivitamins 10 ml/Chromium/ 

Copper/Manganese/ Seleni/Zn 0.5 ml/ Insulin Human Regular 25 unit/ Total 

Parenteral Nutrition/Amino Acids/Dextrose/ Fat Emulsion Intravenous 1,400 ml @  

58.333 mls/ hr TPN  CONT IV  Last administered on 4/4/20at 22:10;  Start 4/4/20 

at 22:00;  Stop 4/5/20 at 21:59;  Status DC


Magnesium Sulfate 50 ml @ 25 mls/hr PRN DAILY  PRN IV for Mag < 1.7 on am labs 

Last administered on 4/20/20at 17:27;  Start 4/5/20 at 09:15


Sodium Chloride 90 meq/Potassium Chloride 15 meq/ Potassium Phosphate 10 mmol/ 

Magnesium Sulfate 8 meq/Calcium Gluconate 15 meq/ Multivitamins 10 ml/Chromium/ 

Copper/Manganese/ Seleni/Zn 0.5 ml/ Insulin Human Regular 25 unit/ Total 

Parenteral Nutrition/Amino Acids/Dextrose/ Fat Emulsion Intravenous 1,400 ml @  

58.333 mls/ hr TPN  CONT IV  Last administered on 4/5/20at 21:20;  Start 4/5/20 

at 22:00;  Stop 4/6/20 at 21:59;  Status DC


Sodium Chloride 1,000 ml @  1,000 mls/hr Q1H PRN IV hypotension;  Start 4/5/20 

at 12:23;  Stop 4/5/20 at 18:22;  Status DC


Albumin Human 200 ml @  200 mls/hr 1X  ONCE IV  Last administered on 4/5/20at 

13:34;  Start 4/5/20 at 12:30;  Stop 4/5/20 at 13:29;  Status DC


Diphenhydramine HCl (Benadryl) 25 mg 1X PRN  PRN IV ITCHING;  Start 4/5/20 at 

12:30;  Stop 4/6/20 at 12:29;  Status DC


Diphenhydramine HCl (Benadryl) 25 mg 1X PRN  PRN IV ITCHING;  Start 4/5/20 at 

12:30;  Stop 4/6/20 at 12:29;  Status DC


Info (PHARMACY MONITORING -- do not chart) 1 each PRN DAILY  PRN MC SEE 

COMMENTS;  Start 4/5/20 at 12:30;  Status Cancel


Bupivacaine HCl/ Epinephrine Bitart (Sensorcain-Epi 0.5%-1:707308 Mpf) 30 ml 

STK-MED ONCE .ROUTE  Last administered on 4/6/20at 11:44;  Start 4/6/20 at 11

:00;  Stop 4/6/20 at 11:01;  Status DC


Cellulose (Surgicel Fibrillar 1x2) 1 each STK-MED ONCE .ROUTE ;  Start 4/6/20 at

11:00;  Stop 4/6/20 at 11:01;  Status DC


Sodium Chloride 90 meq/Potassium Chloride 15 meq/ Potassium Phosphate 10 mmol/ 

Magnesium Sulfate 12 meq/Calcium Gluconate 15 meq/ Multivitamins 10 ml/Chromium/

Copper/Manganese/ Seleni/Zn 0.5 ml/ Insulin Human Regular 25 unit/ Total 

Parenteral Nutrition/Amino Acids/Dextrose/ Fat Emulsion Intravenous 1,400 ml @  

58.333 mls/ hr TPN  CONT IV  Last administered on 4/6/20at 22:24;  Start 4/6/20 

at 22:00;  Stop 4/7/20 at 21:59;  Status DC


Propofol 20 ml @ As Directed STK-MED ONCE IV ;  Start 4/6/20 at 11:07;  Stop 

4/6/20 at 11:07;  Status DC


Cellulose (Surgicel Hemostat 4x8) 1 each STK-MED ONCE .ROUTE  Last administered 

on 4/6/20at 11:44;  Start 4/6/20 at 11:55;  Stop 4/6/20 at 11:56;  Status DC


Sevoflurane (Ultane) 60 ml STK-MED ONCE IH ;  Start 4/6/20 at 12:46;  Stop 

4/6/20 at 12:46;  Status DC


Sodium Chloride 1,000 ml @  1,000 mls/hr Q1H PRN IV hypotension;  Start 4/6/20 

at 13:51;  Stop 4/6/20 at 19:50;  Status DC


Albumin Human 200 ml @  200 mls/hr 1X PRN  PRN IV Hypotension Last administered 

on 4/6/20at 14:51;  Start 4/6/20 at 14:00;  Stop 4/6/20 at 19:59;  Status DC


Diphenhydramine HCl (Benadryl) 25 mg 1X PRN  PRN IV ITCHING;  Start 4/6/20 at 

14:00;  Stop 4/7/20 at 13:59;  Status DC


Diphenhydramine HCl (Benadryl) 25 mg 1X PRN  PRN IV ITCHING;  Start 4/6/20 at 

14:00;  Stop 4/7/20 at 13:59;  Status DC


Sodium Chloride 1,000 ml @  400 mls/hr Q2H30M PRN IV PATENCY;  Start 4/6/20 at 

13:51;  Stop 4/7/20 at 01:50;  Status DC


Info (PHARMACY MONITORING -- do not chart) 1 each PRN DAILY  PRN MC SEE 

COMMENTS;  Start 4/6/20 at 14:00;  Stop 4/9/20 at 08:16;  Status DC


Heparin Sodium (Porcine) (Hep Lock Adult) 500 unit STK-MED ONCE IVP ;  Start 

4/7/20 at 09:29;  Stop 4/7/20 at 09:30;  Status DC


Sodium Chloride 1,000 ml @  1,000 mls/hr Q1H PRN IV hypotension;  Start 4/7/20 

at 10:43;  Stop 4/7/20 at 16:42;  Status DC


Sodium Chloride 1,000 ml @  400 mls/hr Q2H30M PRN IV PATENCY;  Start 4/7/20 at 

10:43;  Stop 4/7/20 at 22:42;  Status DC


Info (PHARMACY MONITORING -- do not chart) 1 each PRN DAILY  PRN MC SEE 

COMMENTS;  Start 4/7/20 at 10:45;  Status UNV


Info (PHARMACY MONITORING -- do not chart) 1 each PRN DAILY  PRN MC SEE CO

MMENTS;  Start 4/7/20 at 10:45;  Status UNV


Sodium Chloride 90 meq/Potassium Chloride 15 meq/ Magnesium Sulfate 12 

meq/Calcium Gluconate 15 meq/ Multivitamins 10 ml/Chromium/ Copper/Manganese/ 

Seleni/Zn 0.5 ml/ Insulin Human Regular 25 unit/ Total Parenteral 

Nutrition/Amino Acids/Dextrose/ Fat Emulsion Intravenous 1,400 ml @  58.333 mls/

hr TPN  CONT IV  Last administered on 4/7/20at 22:13;  Start 4/7/20 at 22:00;  

Stop 4/8/20 at 21:59;  Status DC


Sodium Chloride 1,000 ml @  1,000 mls/hr Q1H PRN IV hypotension;  Start 4/8/20 

at 07:50;  Stop 4/8/20 at 13:49;  Status DC


Albumin Human 200 ml @  200 mls/hr 1X  ONCE IV ;  Start 4/8/20 at 08:00;  Stop 

4/8/20 at 08:53;  Status DC


Diphenhydramine HCl (Benadryl) 25 mg 1X PRN  PRN IV ITCHING;  Start 4/8/20 at 

08:00;  Stop 4/9/20 at 07:59;  Status DC


Diphenhydramine HCl (Benadryl) 25 mg 1X PRN  PRN IV ITCHING;  Start 4/8/20 at 

08:00;  Stop 4/9/20 at 07:59;  Status DC


Info (PHARMACY MONITORING -- do not chart) 1 each PRN DAILY  PRN MC SEE 

COMMENTS;  Start 4/8/20 at 08:00;  Stop 4/9/20 at 08:16;  Status DC


Albumin Human 50 ml @ 50 mls/hr 1X  ONCE IV ;  Start 4/8/20 at 08:53;  Stop 

4/8/20 at 08:56;  Status DC


Albumin Human 200 ml @  50 mls/hr PRN 1X  PRN IV HYPOTENSION Last administered 

on 4/14/20at 11:54;  Start 4/8/20 at 09:00;  Stop 5/21/20 at 11:14;  Status DC


Meropenem 500 mg/ Sodium Chloride 50 ml @  100 mls/hr Q12H IV  Last administered

on 4/28/20at 10:45;  Start 4/8/20 at 10:00;  Stop 4/28/20 at 12:37;  Status DC


Sodium Chloride 90 meq/Magnesium Sulfate 12 meq/ Calcium Gluconate 15 meq/ 

Multivitamins 10 ml/Chromium/ Copper/Manganese/ Seleni/Zn 0.5 ml/ Insulin Human 

Regular 25 unit/ Total Parenteral Nutrition/Amino Acids/Dextrose/ Fat Emulsion 

Intravenous 1,400 ml @  58.333 mls/ hr TPN  CONT IV  Last administered on 4/ 8/20at 21:41;  Start 4/8/20 at 22:00;  Stop 4/9/20 at 21:59;  Status DC


Sodium Chloride 1,000 ml @  1,000 mls/hr Q1H PRN IV hypotension;  Start 4/9/20 

at 07:58;  Stop 4/9/20 at 13:57;  Status DC


Albumin Human 200 ml @  200 mls/hr 1X PRN  PRN IV Hypotension Last administered 

on 4/9/20at 09:30;  Start 4/9/20 at 08:00;  Stop 4/9/20 at 13:59;  Status DC


Sodium Chloride 1,000 ml @  400 mls/hr Q2H30M PRN IV PATENCY;  Start 4/9/20 at 

07:58;  Stop 4/9/20 at 19:57;  Status DC


Info (PHARMACY MONITORING -- do not chart) 1 each PRN DAILY  PRN MC SEE 

COMMENTS;  Start 4/9/20 at 08:00;  Status Cancel


Info (PHARMACY MONITORING -- do not chart) 1 each PRN DAILY  PRN MC SEE 

COMMENTS;  Start 4/9/20 at 08:15;  Status UNV


Sodium Chloride 90 meq/Potassium Phosphate 5 mmol/ Magnesium Sulfate 12 

meq/Calcium Gluconate 15 meq/ Multivitamins 10 ml/Chromium/ Copper/Manganese/ 

Seleni/Zn 0.5 ml/ Insulin Human Regular 30 unit/ Total Parenteral 

Nutrition/Amino Acids/Dextrose/ Fat Emulsion Intravenous 1,400 ml @  58.333 mls/

hr TPN  CONT IV  Last administered on 4/9/20at 22:08;  Start 4/9/20 at 22:00;  

Stop 4/10/20 at 21:59;  Status DC


Linezolid/Dextrose 300 ml @  300 mls/hr Q12HR IV  Last administered on 4/20/20at

20:40;  Start 4/10/20 at 11:00;  Stop 4/21/20 at 08:10;  Status DC


Sodium Chloride 90 meq/Potassium Phosphate 15 mmol/ Magnesium Sulfate 12 

meq/Calcium Gluconate 15 meq/ Multivitamins 10 ml/Chromium/ Copper/Manganese/ 

Seleni/Zn 0.5 ml/ Insulin Human Regular 30 unit/ Total Parenteral 

Nutrition/Amino Acids/Dextrose/ Fat Emulsion Intravenous 1,400 ml @  58.333 mls/

hr TPN  CONT IV  Last administered on 4/10/20at 21:49;  Start 4/10/20 at 22:00; 

Stop 4/11/20 at 21:59;  Status DC


Sodium Chloride 90 meq/Potassium Phosphate 15 mmol/ Magnesium Sulfate 12 

meq/Calcium Gluconate 15 meq/ Multivitamins 10 ml/Chromium/ Copper/Manganese/ 

Seleni/Zn 0.5 ml/ Insulin Human Regular 40 unit/ Total Parenteral 

Nutrition/Amino Acids/Dextrose/ Fat Emulsion Intravenous 1,400 ml @  58.333 mls/

hr TPN  CONT IV  Last administered on 4/11/20at 21:21;  Start 4/11/20 at 22:00; 

Stop 4/12/20 at 21:59;  Status DC


Sodium Chloride 1,000 ml @  1,000 mls/hr Q1H PRN IV hypotension;  Start 4/11/20 

at 13:26;  Stop 4/11/20 at 19:25;  Status DC


Albumin Human 200 ml @  200 mls/hr 1X PRN  PRN IV Hypotension Last administered 

on 4/11/20at 15:00;  Start 4/11/20 at 13:30;  Stop 4/11/20 at 19:29;  Status DC


Sodium Chloride (Normal Saline Flush) 10 ml 1X PRN  PRN IV AP catheter pack;  

Start 4/11/20 at 13:30;  Stop 4/12/20 at 13:29;  Status DC


Sodium Chloride (Normal Saline Flush) 10 ml 1X PRN  PRN IV  catheter pack;  

Start 4/11/20 at 13:30;  Stop 4/12/20 at 13:29;  Status DC


Sodium Chloride 1,000 ml @  400 mls/hr Q2H30M PRN IV PATENCY;  Start 4/11/20 at 

13:26;  Stop 4/12/20 at 01:25;  Status DC


Info (PHARMACY MONITORING -- do not chart) 1 each PRN DAILY  PRN MC SEE 

COMMENTS;  Start 4/11/20 at 13:30;  Stop 4/11/20 at 13:33;  Status DC


Info (PHARMACY MONITORING -- do not chart) 1 each PRN DAILY  PRN MC SEE 

COMMENTS;  Start 4/11/20 at 13:30;  Stop 4/11/20 at 13:34;  Status DC


Sodium Chloride 90 meq/Potassium Phosphate 19 mmol/ Magnesium Sulfate 12 

meq/Calcium Gluconate 15 meq/ Multivitamins 10 ml/Chromium/ Copper/Manganese/ 

Seleni/Zn 0.5 ml/ Insulin Human Regular 40 unit/ Total Parenteral 

Nutrition/Amino Acids/Dextrose/ Fat Emulsion Intravenous 1,400 ml @  58.333 mls/

hr TPN  CONT IV  Last administered on 4/12/20at 21:54;  Start 4/12/20 at 22:00; 

Stop 4/13/20 at 21:59;  Status DC


Sodium Chloride 1,000 ml @  1,000 mls/hr Q1H PRN IV hypotension;  Start 4/13/20 

at 09:35;  Stop 4/13/20 at 15:34;  Status DC


Albumin Human 200 ml @  200 mls/hr 1X PRN  PRN IV Hypotension;  Start 4/13/20 at

09:45;  Stop 4/13/20 at 15:44;  Status DC


Diphenhydramine HCl (Benadryl) 25 mg 1X PRN  PRN IV ITCHING;  Start 4/13/20 at 

09:45;  Stop 4/14/20 at 09:44;  Status DC


Diphenhydramine HCl (Benadryl) 25 mg 1X PRN  PRN IV ITCHING;  Start 4/13/20 at 

09:45;  Stop 4/14/20 at 09:44;  Status DC


Sodium Chloride 1,000 ml @  400 mls/hr Q2H30M PRN IV PATENCY;  Start 4/13/20 at 

09:35;  Stop 4/13/20 at 21:34;  Status DC


Info (PHARMACY MONITORING -- do not chart) 1 each PRN DAILY  PRN MC SEE 

COMMENTS;  Start 4/13/20 at 09:45;  Status Cancel


Sodium Chloride 100 meq/Potassium Phosphate 19 mmol/ Magnesium Sulfate 12 

meq/Calcium Gluconate 15 meq/ Multivitamins 10 ml/Chromium/ Copper/Manganese/ 

Seleni/Zn 0.5 ml/ Insulin Human Regular 40 unit/ Potassium Chloride 20 meq/ 

Total Parenteral Nutrition/Amino Acids/Dextrose/ Fat Emulsion Intravenous 1,400 

ml @  58.333 mls/ hr TPN  CONT IV  Last administered on 4/13/20at 22:02;  Start 

4/13/20 at 22:00;  Stop 4/14/20 at 21:59;  Status DC


Furosemide (Lasix) 40 mg 1X  ONCE IVP  Last administered on 4/13/20at 14:39;  

Start 4/13/20 at 14:30;  Stop 4/13/20 at 14:31;  Status DC


Metronidazole 100 ml @  100 mls/hr Q8HRS IV  Last administered on 4/21/20at 06:

04;  Start 4/14/20 at 10:00;  Stop 4/21/20 at 08:10;  Status DC


Sodium Chloride 1,000 ml @  1,000 mls/hr Q1H PRN IV hypotension;  Start 4/14/20 

at 08:00;  Stop 4/14/20 at 13:59;  Status DC


Albumin Human 200 ml @  200 mls/hr 1X PRN  PRN IV Hypotension;  Start 4/14/20 at

08:00;  Stop 4/14/20 at 13:59;  Status DC


Sodium Chloride 1,000 ml @  400 mls/hr Q2H30M PRN IV PATENCY;  Start 4/14/20 at 

08:00;  Stop 4/14/20 at 19:59;  Status DC


Info (PHARMACY MONITORING -- do not chart) 1 each PRN DAILY  PRN MC SEE MIKEY

TS;  Start 4/14/20 at 11:30;  Status UNV


Info (PHARMACY MONITORING -- do not chart) 1 each PRN DAILY  PRN MC SEE 

COMMENTS;  Start 4/14/20 at 11:30;  Stop 4/16/20 at 12:13;  Status DC


Sodium Chloride 100 meq/Potassium Phosphate 19 mmol/ Magnesium Sulfate 12 

meq/Calcium Gluconate 15 meq/ Multivitamins 10 ml/Chromium/ Copper/Manganese/ 

Seleni/Zn 0.5 ml/ Insulin Human Regular 40 unit/ Potassium Chloride 20 meq/ 

Total Parenteral Nutrition/Amino Acids/Dextrose/ Fat Emulsion Intravenous 1,400 

ml @  58.333 mls/ hr TPN  CONT IV  Last administered on 4/14/20at 21:52;  Start 

4/14/20 at 22:00;  Stop 4/15/20 at 21:59;  Status DC


Sodium Chloride (Normal Saline Flush) 10 ml QSHIFT  PRN IV AFTER MEDS AND BLOOD 

DRAWS;  Start 4/14/20 at 15:00;  Stop 5/12/20 at 11:27;  Status DC


Sodium Chloride (Normal Saline Flush) 10 ml PRN Q5MIN  PRN IV AFTER MEDS AND 

BLOOD DRAWS;  Start 4/14/20 at 15:00


Sodium Chloride (Normal Saline Flush) 20 ml PRN Q5MIN  PRN IV AFTER MEDS AND 

BLOOD DRAWS;  Start 4/14/20 at 15:00


Sodium Chloride 100 meq/Potassium Phosphate 19 mmol/ Magnesium Sulfate 12 

meq/Calcium Gluconate 15 meq/ Multivitamins 10 ml/Chromium/ Copper/Manganese/ S

haley/Zn 0.5 ml/ Insulin Human Regular 40 unit/ Potassium Chloride 20 meq/ Total

Parenteral Nutrition/Amino Acids/Dextrose/ Fat Emulsion Intravenous 1,400 ml @  

58.333 mls/ hr TPN  CONT IV  Last administered on 4/15/20at 21:20;  Start 

4/15/20 at 22:00;  Stop 4/16/20 at 21:59;  Status DC


Lidocaine HCl (Buffered Lidocaine 1%) 3 ml STK-MED ONCE .ROUTE ;  Start 4/15/20 

at 13:16;  Stop 4/15/20 at 13:16;  Status DC


Lidocaine HCl (Buffered Lidocaine 1%) 6 ml 1X  ONCE INJ  Last administered on 

4/15/20at 13:45;  Start 4/15/20 at 13:30;  Stop 4/15/20 at 13:31;  Status DC


Albumin Human 100 ml @  100 mls/hr 1X  ONCE IV  Last administered on 4/15/20at 

15:41;  Start 4/15/20 at 15:00;  Stop 4/15/20 at 15:59;  Status DC


Albumin Human 50 ml @ 50 mls/hr 1X  ONCE IV  Last administered on 4/15/20at 

15:00;  Start 4/15/20 at 15:00;  Stop 4/15/20 at 15:59;  Status DC


Info (PHARMACY MONITORING -- do not chart) 1 each PRN DAILY  PRN MC SEE 

COMMENTS;  Start 4/16/20 at 11:30;  Status Cancel


Info (PHARMACY MONITORING -- do not chart) 1 each PRN DAILY  PRN MC SEE 

COMMENTS;  Start 4/16/20 at 11:30;  Status UNV


Sodium Chloride 100 meq/Potassium Phosphate 10 mmol/ Magnesium Sulfate 12 

meq/Calcium Gluconate 15 meq/ Multivitamins 10 ml/Chromium/ Copper/Manganese/ 

Seleni/Zn 0.5 ml/ Insulin Human Regular 35 unit/ Potassium Chloride 20 meq/ 

Total Parenteral Nutrition/Amino Acids/Dextrose/ Fat Emulsion Intravenous 1,400 

ml @  58.333 mls/ hr TPN  CONT IV  Last administered on 4/16/20at 22:10;  Start 

4/16/20 at 22:00;  Stop 4/17/20 at 21:59;  Status DC


Sodium Chloride 100 meq/Potassium Phosphate 5 mmol/ Magnesium Sulfate 12 

meq/Calcium Gluconate 15 meq/ Multivitamins 10 ml/Chromium/ Copper/Manganese/ 

Seleni/Zn 0.5 ml/ Insulin Human Regular 35 unit/ Potassium Chloride 20 meq/ 

Total Parenteral Nutrition/Amino Acids/Dextrose/ Fat Emulsion Intravenous 1,400 

ml @  58.333 mls/ hr TPN  CONT IV  Last administered on 4/17/20at 22:59;  Start 

4/17/20 at 22:00;  Stop 4/18/20 at 21:59;  Status DC


Sodium Chloride 1,000 ml @  1,000 mls/hr Q1H PRN IV hypotension;  Start 4/18/20 

at 08:27;  Stop 4/18/20 at 14:26;  Status DC


Albumin Human 200 ml @  200 mls/hr 1X PRN  PRN IV Hypotension Last administered 

on 4/18/20at 09:18;  Start 4/18/20 at 08:30;  Stop 4/18/20 at 14:29;  Status DC


Sodium Chloride 1,000 ml @  400 mls/hr Q2H30M PRN IV PATENCY;  Start 4/18/20 at 

08:27;  Stop 4/18/20 at 20:26;  Status DC


Info (PHARMACY MONITORING -- do not chart) 1 each PRN DAILY  PRN MC SEE 

COMMENTS;  Start 4/18/20 at 08:30;  Status Cancel


Info (PHARMACY MONITORING -- do not chart) 1 each PRN DAILY  PRN MC SEE 

COMMENTS;  Start 4/18/20 at 08:30;  Stop 4/26/20 at 13:10;  Status DC


Sodium Chloride 100 meq/Potassium Chloride 40 meq/ Magnesium Sulfate 15 

meq/Calcium Gluconate 15 meq/ Multivitamins 10 ml/Chromium/ Copper/Manganese/ 

Seleni/Zn 0.5 ml/ Insulin Human Regular 35 unit/ Total Parenteral 

Nutrition/Amino Acids/Dextrose/ Fat Emulsion Intravenous 1,400 ml @  58.333 mls/

hr TPN  CONT IV  Last administered on 4/18/20at 22:00;  Start 4/18/20 at 22:00; 

Stop 4/19/20 at 21:59;  Status DC


Potassium Chloride/Water 100 ml @  100 mls/hr 1X  ONCE IV  Last administered on 

4/18/20at 17:28;  Start 4/18/20 at 14:45;  Stop 4/18/20 at 15:44;  Status DC


Sodium Chloride 100 meq/Potassium Chloride 40 meq/ Magnesium Sulfate 15 

meq/Calcium Gluconate 15 meq/ Multivitamins 10 ml/Chromium/ Copper/Manganese/ 

Seleni/Zn 0.5 ml/ Insulin Human Regular 35 unit/ Total Parenteral 

Nutrition/Amino Acids/Dextrose/ Fat Emulsion Intravenous 1,400 ml @  58.333 mls/

hr TPN  CONT IV  Last administered on 4/19/20at 22:46;  Start 4/19/20 at 22:00; 

Stop 4/20/20 at 21:59;  Status DC


Sodium Chloride 100 meq/Potassium Chloride 40 meq/ Magnesium Sulfate 20 

meq/Calcium Gluconate 15 meq/ Multivitamins 10 ml/Chromium/ Copper/Manganese/ 

Seleni/Zn 0.5 ml/ Insulin Human Regular 35 unit/ Total Parenteral 

Nutrition/Amino Acids/Dextrose/ Fat Emulsion Intravenous 1,400 ml @  58.333 mls/

hr TPN  CONT IV  Last administered on 4/20/20at 22:31;  Start 4/20/20 at 22:00; 

Stop 4/21/20 at 21:59;  Status DC


Fentanyl Citrate (Fentanyl 2ml Vial) 50 mcg PRN Q2HR  PRN IVP PAIN Last 

administered on 4/27/20at 13:32;  Start 4/20/20 at 21:00;  Stop 4/28/20 at 

12:53;  Status DC


Fentanyl Citrate (Fentanyl 2ml Vial) 25 mcg PRN Q2HR  PRN IVP PAIN;  Start 

4/20/20 at 21:00;  Stop 4/28/20 at 12:54;  Status DC


Enoxaparin Sodium (Lovenox 100mg Syringe) 100 mg Q12HR SQ ;  Start 4/21/20 at 

21:00;  Status UNV


Amino Acids/ Glycerin/ Electrolytes 1,000 ml @  75 mls/hr [id number] IV ;  Start 

4/20/20 at 21:15;  Status UNV


Sodium Chloride 1,000 ml @  1,000 mls/hr Q1H PRN IV hypotension;  Start 4/21/20 

at 07:56;  Stop 4/21/20 at 13:55;  Status DC


Albumin Human 200 ml @  200 mls/hr 1X PRN  PRN IV Hypotension Last administered 

on 4/21/20at 08:40;  Start 4/21/20 at 08:00;  Stop 4/21/20 at 13:59;  Status DC


Sodium Chloride 1,000 ml @  400 mls/hr Q2H30M PRN IV PATENCY;  Start 4/21/20 at 

07:56;  Stop 4/21/20 at 19:55;  Status DC


Info (PHARMACY MONITORING -- do not chart) 1 each PRN DAILY  PRN MC SEE 

COMMENTS;  Start 4/21/20 at 08:00;  Status UNV


Info (PHARMACY MONITORING -- do not chart) 1 each PRN DAILY  PRN MC SEE 

COMMENTS;  Start 4/21/20 at 08:00;  Status UNV


Daptomycin 430 mg/ Sodium Chloride 50 ml @  100 mls/hr Q24H IV  Last 

administered on 4/21/20at 12:35;  Start 4/21/20 at 09:00;  Stop 4/21/20 at 

12:49;  Status DC


Sodium Chloride 100 meq/Potassium Chloride 40 meq/ Magnesium Sulfate 20 

meq/Calcium Gluconate 15 meq/ Multivitamins 10 ml/Chromium/ Copper/Manganese/ 

Seleni/Zn 0.5 ml/ Insulin Human Regular 35 unit/ Total Parenteral 

Nutrition/Amino Acids/Dextrose/ Fat Emulsion Intravenous 1,400 ml @  58.333 mls/

hr TPN  CONT IV  Last administered on 4/21/20at 21:26;  Start 4/21/20 at 22:00; 

Stop 4/22/20 at 21:59;  Status DC


Daptomycin 430 mg/ Sodium Chloride 50 ml @  100 mls/hr Q48H IV ;  Start 4/23/20 

at 09:00;  Stop 4/22/20 at 11:55;  Status DC


Sodium Chloride 100 meq/Potassium Chloride 40 meq/ Magnesium Sulfate 20 

meq/Calcium Gluconate 15 meq/ Multivitamins 10 ml/Chromium/ Copper/Manganese/ 

Seleni/Zn 0.5 ml/ Insulin Human Regular 35 unit/ Total Parenteral 

Nutrition/Amino Acids/Dextrose/ Fat Emulsion Intravenous 1,400 ml @  58.333 mls/

hr TPN  CONT IV  Last administered on 4/22/20at 22:27;  Start 4/22/20 at 22:00; 

Stop 4/23/20 at 21:59;  Status DC


Daptomycin 430 mg/ Sodium Chloride 50 ml @  100 mls/hr Q24H IV  Last 

administered on 4/24/20at 15:07;  Start 4/22/20 at 13:00;  Stop 4/25/20 at 

13:15;  Status DC


Sodium Chloride 100 meq/Potassium Chloride 40 meq/ Magnesium Sulfate 20 

meq/Calcium Gluconate 10 meq/ Multivitamins 10 ml/Chromium/ Copper/Manganese/ 

Seleni/Zn 0.5 ml/ Insulin Human Regular 35 unit/ Total Parenteral 

Nutrition/Amino Acids/Dextrose/ Fat Emulsion Intravenous 1,400 ml @  58.333 mls/

hr TPN  CONT IV  Last administered on 4/24/20at 00:06;  Start 4/23/20 at 22:00; 

Stop 4/24/20 at 21:59;  Status DC


Alteplase, Recombinant (Cathflo For Central Catheter Clearance) 1 mg 1X  ONCE 

INT CAT  Last administered on 4/24/20at 11:44;  Start 4/24/20 at 10:45;  Stop 

4/24/20 at 10:46;  Status DC


Ondansetron HCl (Zofran) 4 mg PRN Q6HRS  PRN IV NAUSEA/VOMITING;  Start 4/27/20 

at 07:00;  Stop 4/28/20 at 06:59;  Status DC


Fentanyl Citrate (Fentanyl 2ml Vial) 25 mcg PRN Q5MIN  PRN IV MILD PAIN 1-3;  

Start 4/27/20 at 07:00;  Stop 4/28/20 at 06:59;  Status DC


Fentanyl Citrate (Fentanyl 2ml Vial) 50 mcg PRN Q5MIN  PRN IV MODERATE TO SEVERE

PAIN Last administered on 4/27/20at 10:17;  Start 4/27/20 at 07:00;  Stop 

4/28/20 at 06:59;  Status DC


Ringer's Solution 1,000 ml @  30 mls/hr Q24H IV ;  Start 4/27/20 at 07:00;  Stop

4/27/20 at 18:59;  Status DC


Lidocaine HCl (Xylocaine-Mpf 1% 2ml Vial) 2 ml PRN 1X  PRN ID PRIOR TO IV START;

 Start 4/27/20 at 07:00;  Stop 4/28/20 at 06:59;  Status DC


Prochlorperazine Edisylate (Compazine) 5 mg PACU PRN  PRN IV NAUSEA, MRX1;  

Start 4/27/20 at 07:00;  Stop 4/28/20 at 06:59;  Status DC


Sodium Acetate 50 meq/Potassium Acetate 55 meq/ Magnesium Sulfate 20 meq/Calcium

Gluconate 10 meq/ Multivitamins 10 ml/Chromium/ Copper/Manganese/ Seleni/Zn 0.5 

ml/ Insulin Human Regular 35 unit/ Total Parenteral Nutrition/Amino 

Acids/Dextrose/ Fat Emulsion Intravenous 1,400 ml @  58.333 mls/ hr TPN  CONT IV

;  Start 4/24/20 at 22:00;  Stop 4/24/20 at 14:15;  Status DC


Sodium Acetate 50 meq/Potassium Acetate 55 meq/ Magnesium Sulfate 20 meq/Calcium

Gluconate 10 meq/ Multivitamins 10 ml/Chromium/ Copper/Manganese/ Seleni/Zn 0.5 

ml/ Insulin Human Regular 35 unit/ Total Parenteral Nutrition/Amino 

Acids/Dextrose/ Fat Emulsion Intravenous 1,800 ml @  75 mls/hr TPN  CONT IV  

Last administered on 4/24/20at 22:38;  Start 4/24/20 at 22:00;  Stop 4/25/20 at 

21:59;  Status DC


Sodium Chloride 1,000 ml @  1,000 mls/hr Q1H PRN IV hypotension;  Start 4/24/20 

at 15:31;  Stop 4/24/20 at 21:30;  Status DC


Diphenhydramine HCl (Benadryl) 25 mg 1X PRN  PRN IV ITCHING;  Start 4/24/20 at 

15:45;  Stop 4/25/20 at 15:44;  Status DC


Diphenhydramine HCl (Benadryl) 25 mg 1X PRN  PRN IV ITCHING;  Start 4/24/20 at 

15:45;  Stop 4/25/20 at 15:44;  Status DC


Sodium Chloride 1,000 ml @  400 mls/hr Q2H30M PRN IV PATENCY;  Start 4/24/20 at 

15:31;  Stop 4/25/20 at 03:30;  Status DC


Info (PHARMACY MONITORING -- do not chart) 1 each PRN DAILY  PRN MC SEE 

COMMENTS;  Start 4/24/20 at 15:45


Sodium Acetate 50 meq/Potassium Acetate 55 meq/ Magnesium Sulfate 20 meq/Calcium

Gluconate 10 meq/ Multivitamins 10 ml/Chromium/ Copper/Manganese/ Seleni/Zn 0.5 

ml/ Insulin Human Regular 35 unit/ Total Parenteral Nutrition/Amino 

Acids/Dextrose/ Fat Emulsion Intravenous 1,800 ml @  75 mls/hr TPN  CONT IV  

Last administered on 4/25/20at 22:03;  Start 4/25/20 at 22:00;  Stop 4/26/20 at 

21:59;  Status DC


Daptomycin 430 mg/ Sodium Chloride 50 ml @  100 mls/hr Q24H IV  Last 

administered on 4/30/20at 13:00;  Start 4/25/20 at 13:00;  Stop 4/30/20 at 

20:58;  Status DC


Heparin Sodium (Porcine) 1000 unit/Sodium Chloride 1,001 ml @  1,001 mls/hr 1X  

ONCE IRR ;  Start 4/27/20 at 06:00;  Stop 4/27/20 at 06:59;  Status DC


Potassium Acetate 55 meq/Magnesium Sulfate 20 meq/ Calcium Gluconate 10 meq/ 

Multivitamins 10 ml/Chromium/ Copper/Manganese/ Seleni/Zn 0.5 ml/ Insulin Human 

Regular 35 unit/ Total Parenteral Nutrition/Amino Acids/Dextrose/ Fat Emulsion 

Intravenous 1,920 ml @  80 mls/hr TPN  CONT IV  Last administered on 4/26/20at 

22:10;  Start 4/26/20 at 22:00;  Stop 4/27/20 at 21:59;  Status DC


Dexamethasone Sodium Phosphate (Decadron) 4 mg STK-MED ONCE .ROUTE ;  Start 

4/27/20 at 10:56;  Stop 4/27/20 at 10:57;  Status DC


Ondansetron HCl (Zofran) 4 mg STK-MED ONCE .ROUTE ;  Start 4/27/20 at 10:56;  

Stop 4/27/20 at 10:57;  Status DC


Rocuronium Bromide (Zemuron) 50 mg STK-MED ONCE .ROUTE ;  Start 4/27/20 at 

10:56;  Stop 4/27/20 at 10:57;  Status DC


Fentanyl Citrate (Fentanyl 2ml Vial) 100 mcg STK-MED ONCE .ROUTE ;  Start 

4/27/20 at 10:56;  Stop 4/27/20 at 10:57;  Status DC


Bupivacaine HCl/ Epinephrine Bitart (Sensorcain-Epi 0.5%-1:[id number] Mpf) 30 ml 

STK-MED ONCE .ROUTE  Last administered on 4/27/20at 12:01;  Start 4/27/20 at 

10:58;  Stop 4/27/20 at 10:58;  Status DC


Cellulose (Surgicel Hemostat 2x14) 1 each STK-MED ONCE .ROUTE ;  Start 4/27/20 

at 10:58;  Stop 4/27/20 at 10:59;  Status DC


Iohexol (Omnipaque 300 Mg/ml) 50 ml STK-MED ONCE .ROUTE ;  Start 4/27/20 at 

10:58;  Stop 4/27/20 at 10:59;  Status DC


Cellulose (Surgicel Hemostat 4x8) 1 each STK-MED ONCE .ROUTE ;  Start 4/27/20 at

10:58;  Stop 4/27/20 at 10:59;  Status DC


Bisacodyl (Dulcolax Supp) 10 mg STK-MED ONCE .ROUTE ;  Start 4/27/20 at 10:59;  

Stop 4/27/20 at 10:59;  Status DC


Heparin Sodium (Porcine) 1000 unit/Sodium Chloride 1,001 ml @  1,001 mls/hr 1X  

ONCE IRR ;  Start 4/27/20 at 12:00;  Stop 4/27/20 at 12:59;  Status DC


Propofol 20 ml @ As Directed STK-MED ONCE IV ;  Start 4/27/20 at 11:05;  Stop 

4/27/20 at 11:05;  Status DC


Sevoflurane (Ultane) 90 ml STK-MED ONCE IH ;  Start 4/27/20 at 11:05;  Stop 

4/27/20 at 11:05;  Status DC


Sevoflurane (Ultane) 60 ml STK-MED ONCE IH ;  Start 4/27/20 at 12:26;  Stop 

4/27/20 at 12:27;  Status DC


Propofol 20 ml @ As Directed STK-MED ONCE IV ;  Start 4/27/20 at 12:26;  Stop 

4/27/20 at 12:27;  Status DC


Phenylephrine HCl (PHENYLEPHRINE in 0.9% NACL PF) 1 mg STK-MED ONCE IV ;  Start 

4/27/20 at 12:34;  Stop 4/27/20 at 12:34;  Status DC


Heparin Sodium (Porcine) (Heparin Sodium) 5,000 unit Q12HR SQ  Last administered

on 5/6/20at 20:57;  Start 4/27/20 at 21:00;  Stop 5/7/20 at 09:59;  Status DC


Sodium Chloride (Normal Saline Flush) 3 ml QSHIFT  PRN IV AFTER MEDS AND BLOOD 

DRAWS;  Start 4/27/20 at 13:45


Naloxone HCl (Narcan) 0.4 mg PRN Q2MIN  PRN IV SEE INSTRUCTIONS;  Start 4/27/20 

at 13:45


Sodium Chloride 1,000 ml @  25 mls/hr Q24H IV  Last administered on 5/19/20at 

13:37;  Start 4/27/20 at 13:37


Naloxone HCl (Narcan) 0.4 mg PRN Q2MIN  PRN IV SEE INSTRUCTIONS;  Start 4/27/20 

at 14:30;  Status UNV


Sodium Chloride 1,000 ml @  25 mls/hr Q24H IV ;  Start 4/27/20 at 14:30;  Status

UNV


Hydromorphone HCl 30 ml @ 0 mls/hr CONT PRN  PRN IV PER PROTOCOL Last 

administered on 5/2/20at 16:08;  Start 4/27/20 at 14:30;  Stop 5/4/20 at 08:55; 

Status DC


Potassium Acetate 55 meq/Magnesium Sulfate 20 meq/ Calcium Gluconate 10 meq/ 

Multivitamins 10 ml/Chromium/ Copper/Manganese/ Seleni/Zn 0.5 ml/ Insulin Human 

Regular 35 unit/ Total Parenteral Nutrition/Amino Acids/Dextrose/ Fat Emulsion 

Intravenous 1,920 ml @  80 mls/hr TPN  CONT IV  Last administered on 4/27/20at 

22:01;  Start 4/27/20 at 22:00;  Stop 4/28/20 at 21:59;  Status DC


Bumetanide (Bumex) 2 mg [id number] IV  Last administered on 5/1/20at 13:50;  Start 

4/28/20 at 14:00;  Stop 5/2/20 at 14:10;  Status DC


Meropenem 1 gm/ Sodium Chloride 100 ml @  200 mls/hr Q8HRS IV  Last administered

on 5/22/20at 05:53;  Start 4/28/20 at 14:00;  Stop 5/22/20 at 09:31;  Status DC


Potassium Acetate 55 meq/Magnesium Sulfate 20 meq/ Calcium Gluconate 10 meq/ 

Multivitamins 10 ml/Chromium/ Copper/Manganese/ Seleni/Zn 0.5 ml/ Insulin Human 

Regular 35 unit/ Total Parenteral Nutrition/Amino Acids/Dextrose/ Fat Emulsion 

Intravenous 1,920 ml @  80 mls/hr TPN  CONT IV  Last administered on 4/28/20at 

22:02;  Start 4/28/20 at 22:00;  Stop 4/29/20 at 21:59;  Status DC


Hydromorphone HCl (Dilaudid Standard PCA) 12 mg STK-MED ONCE IV ;  Start 4/27/20

at 14:35;  Stop 4/28/20 at 13:53;  Status DC


Artificial Tears (Artificial Tears) 1 drop PRN Q15MIN  PRN OU DRY EYE Last 

administered on 5/18/20at 08:08;  Start 4/29/20 at 05:30


Hydromorphone HCl (Dilaudid Standard PCA) 12 mg STK-MED ONCE IV ;  Start 4/28/20

at 12:05;  Stop 4/29/20 at 09:15;  Status DC


Potassium Acetate 65 meq/Magnesium Sulfate 20 meq/ Calcium Gluconate 10 meq/ 

Multivitamins 10 ml/Chromium/ Copper/Manganese/ Seleni/Zn 0.5 ml/ Insulin Human 

Regular 30 unit/ Total Parenteral Nutrition/Amino Acids/Dextrose/ Fat Emulsion 

Intravenous 1,920 ml @  80 mls/hr TPN  CONT IV  Last administered on 4/29/20at 

22:22;  Start 4/29/20 at 22:00;  Stop 4/30/20 at 21:59;  Status DC


Cyclobenzaprine HCl (Flexeril) 10 mg PRN Q6HRS  PRN PO MUSCLE SPASMS;  Start 

4/30/20 at 10:45


Potassium Acetate 55 meq/Magnesium Sulfate 20 meq/ Calcium Gluconate 10 meq/ 

Multivitamins 10 ml/Chromium/ Copper/Manganese/ Seleni/Zn 0.5 ml/ Insulin Human 

Regular 30 unit/ Total Parenteral Nutrition/Amino Acids/Dextrose/ Fat Emulsion 

Intravenous 1,920 ml @  80 mls/hr TPN  CONT IV  Last administered on 5/1/20at 

01:00;  Start 4/30/20 at 22:00;  Stop 5/1/20 at 21:59;  Status DC


Magnesium Sulfate 50 ml @ 25 mls/hr 1X  ONCE IV  Last administered on 4/30/20at 

17:18;  Start 4/30/20 at 12:45;  Stop 4/30/20 at 14:44;  Status DC


Potassium Chloride/Water 100 ml @  100 mls/hr 1X  ONCE IV  Last administered on 

5/1/20at 11:27;  Start 5/1/20 at 12:00;  Stop 5/1/20 at 12:59;  Status DC


Hydromorphone HCl (Dilaudid Standard PCA) 12 mg STK-MED ONCE IV ;  Start 4/29/20

at 10:50;  Stop 5/1/20 at 11:02;  Status DC


Hydromorphone HCl (Dilaudid Standard PCA) 12 mg STK-MED ONCE IV ;  Start 4/30/20

at 13:47;  Stop 5/1/20 at 11:03;  Status DC


Potassium Acetate 30 meq/Magnesium Sulfate 20 meq/ Calcium Gluconate 10 meq/ 

Multivitamins 10 ml/Chromium/ Copper/Manganese/ Seleni/Zn 0.5 ml/ Insulin Human 

Regular 30 unit/ Potassium Chloride 30 meq/ Total Parenteral Nutrition/Amino 

Acids/Dextrose/ Fat Emulsion Intravenous 1,920 ml @  80 mls/hr TPN  CONT IV  

Last administered on 5/1/20at 22:34;  Start 5/1/20 at 22:00;  Stop 5/2/20 at 

21:59;  Status DC


Potassium Chloride/Water 100 ml @  100 mls/hr Q1H IV  Last administered on 

5/2/20at 13:05;  Start 5/2/20 at 07:00;  Stop 5/2/20 at 10:59;  Status DC


Magnesium Sulfate 50 ml @ 25 mls/hr 1X  ONCE IV  Last administered on 5/2/20at 

10:34;  Start 5/2/20 at 10:30;  Stop 5/2/20 at 12:29;  Status DC


Potassium Chloride 75 meq/ Magnesium Sulfate 20 meq/Calcium Gluconate 10 meq/ 

Multivitamins 10 ml/Chromium/ Copper/Manganese/ Seleni/Zn 0.5 ml/ Insulin Human 

Regular 30 unit/ Total Parenteral Nutrition/Amino Acids/Dextrose/ Fat Emulsion 

Intravenous 1,920 ml @  80 mls/hr TPN  CONT IV  Last administered on 5/2/20at 

21:51;  Start 5/2/20 at 22:00;  Stop 5/3/20 at 22:00;  Status DC


Potassium Chloride 75 meq/ Magnesium Sulfate 20 meq/Calcium Gluconate 10 meq/ 

Multivitamins 10 ml/Chromium/ Copper/Manganese/ Seleni/Zn 0.5 ml/ Insulin Human 

Regular 25 unit/ Total Parenteral Nutrition/Amino Acids/Dextrose/ Fat Emulsion 

Intravenous 1,920 ml @  80 mls/hr TPN  CONT IV  Last administered on 5/3/20at 

22:04;  Start 5/3/20 at 22:00;  Stop 5/4/20 at 21:59;  Status DC


Hydromorphone HCl (Dilaudid) 0.4 mg PRN Q4HRS  PRN IVP PAIN Last administered on

5/4/20at 10:57;  Start 5/4/20 at 09:00;  Stop 5/4/20 at 18:59;  Status DC


Micafungin Sodium 100 mg/Dextrose 100 ml @  100 mls/hr Q24H IV  Last 

administered on 5/23/20at 10:33;  Start 5/4/20 at 11:00


Daptomycin 485 mg/ Sodium Chloride 50 ml @  100 mls/hr Q24H IV  Last 

administered on 5/11/20at 13:10;  Start 5/4/20 at 11:00;  Stop 5/12/20 at 07:44;

 Status DC


Potassium Chloride 75 meq/ Magnesium Sulfate 15 meq/Calcium Gluconate 8 meq/ 

Multivitamins 10 ml/Chromium/ Copper/Manganese/ Seleni/Zn 0.5 ml/ Insulin Human 

Regular 25 unit/ Total Parenteral Nutrition/Amino Acids/Dextrose/ Fat Emulsion 

Intravenous 1,920 ml @  80 mls/hr TPN  CONT IV  Last administered on 5/4/20at 

23:08;  Start 5/4/20 at 22:00;  Stop 5/5/20 at 21:59;  Status DC


Haloperidol Lactate (Haldol Inj) 3 mg 1X  ONCE IVP  Last administered on 

5/4/20at 14:37;  Start 5/4/20 at 14:30;  Stop 5/4/20 at 14:31;  Status DC


Hydromorphone HCl (Dilaudid) 1 mg PRN Q4HRS  PRN IVP PAIN Last administered on 

5/18/20at 06:25;  Start 5/4/20 at 19:00;  Stop 5/18/20 at 17:10;  Status DC


Potassium Chloride 75 meq/ Magnesium Sulfate 15 meq/Calcium Gluconate 8 meq/ 

Multivitamins 10 ml/Chromium/ Copper/Manganese/ Seleni/Zn 0.5 ml/ Insulin Human 

Regular 20 unit/ Total Parenteral Nutrition/Amino Acids/Dextrose/ Fat Emulsion 

Intravenous 1,920 ml @  80 mls/hr TPN  CONT IV  Last administered on 5/5/20at 

22:10;  Start 5/5/20 at 22:00;  Stop 5/6/20 at 21:59;  Status DC


Lidocaine HCl (Buffered Lidocaine 1%) 3 ml STK-MED ONCE .ROUTE ;  Start 5/6/20 

at 11:31;  Stop 5/6/20 at 11:31;  Status DC


Lidocaine HCl (Buffered Lidocaine 1%) 3 ml STK-MED ONCE .ROUTE ;  Start 5/6/20 

at 12:28;  Stop 5/6/20 at 12:29;  Status DC


Lidocaine HCl (Buffered Lidocaine 1%) 6 ml 1X  ONCE INJ  Last administered on 

5/6/20at 12:53;  Start 5/6/20 at 12:45;  Stop 5/6/20 at 12:46;  Status DC


Potassium Chloride 75 meq/ Magnesium Sulfate 15 meq/Calcium Gluconate 8 meq/ 

Multivitamins 10 ml/Chromium/ Copper/Manganese/ Seleni/Zn 0.5 ml/ Insulin Human 

Regular 20 unit/ Total Parenteral Nutrition/Amino Acids/Dextrose/ Fat Emulsion 

Intravenous 1,920 ml @  80 mls/hr TPN  CONT IV  Last administered on 5/6/20at 

22:00;  Start 5/6/20 at 22:00;  Stop 5/7/20 at 21:59;  Status DC


Potassium Chloride 75 meq/ Magnesium Sulfate 15 meq/Calcium Gluconate 8 meq/ 

Multivitamins 10 ml/Chromium/ Copper/Manganese/ Seleni/Zn 0.5 ml/ Insulin Human 

Regular 15 unit/ Total Parenteral Nutrition/Amino Acids/Dextrose/ Fat Emulsion 

Intravenous 1,920 ml @  80 mls/hr TPN  CONT IV  Last administered on 5/7/20at 

22:28;  Start 5/7/20 at 22:00;  Stop 5/8/20 at 21:59;  Status DC


Vecuronium Bromide (Norcuron Bolus) 6 mg PRN Q6HRS  PRN IV VENT ASYNCHRONY;  

Start 5/7/20 at 19:15;  Stop 5/7/20 at 19:35;  Status DC


Bumetanide (Bumex) 2 mg 1X  ONCE IV  Last administered on 5/7/20at 22:09;  Start

5/7/20 at 19:45;  Stop 5/7/20 at 19:46;  Status DC


Lidocaine HCl (Buffered Lidocaine 1%) 3 ml STK-MED ONCE .ROUTE ;  Start 5/8/20 

at 07:59;  Stop 5/8/20 at 07:59;  Status DC


Midazolam HCl (Versed) 5 mg STK-MED ONCE .ROUTE ;  Start 5/8/20 at 08:36;  Stop 

5/8/20 at 08:36;  Status DC


Fentanyl Citrate (Fentanyl 5ml Vial) 250 mcg STK-MED ONCE .ROUTE ;  Start 5/8/20

at 08:36;  Stop 5/8/20 at 08:37;  Status DC


Lidocaine HCl (Buffered Lidocaine 1%) 3 ml 1X  ONCE IJ  Last administered on 

5/8/20at 09:30;  Start 5/8/20 at 09:15;  Stop 5/8/20 at 09:16;  Status DC


Midazolam HCl (Versed) 5 mg 1X  ONCE IV  Last administered on 5/8/20at 09:30;  

Start 5/8/20 at 09:15;  Stop 5/8/20 at 09:16;  Status DC


Fentanyl Citrate (Fentanyl 5ml Vial) 250 mcg 1X  ONCE IV  Last administered on 5 /8/20at 09:30;  Start 5/8/20 at 09:15;  Stop 5/8/20 at 09:16;  Status DC


Bumetanide (Bumex) 2 mg DAILY IV  Last administered on 5/18/20at 08:07;  Start 

5/8/20 at 10:00;  Stop 5/18/20 at 17:15;  Status DC


Potassium Chloride 75 meq/ Magnesium Sulfate 15 meq/ Multivitamins 10 

ml/Chromium/ Copper/Manganese/ Seleni/Zn 0.5 ml/ Insulin Human Regular 15 unit/ 

Total Parenteral Nutrition/Amino Acids/Dextrose/ Fat Emulsion Intravenous 1,920 

ml @  80 mls/hr TPN  CONT IV  Last administered on 5/8/20at 21:59;  Start 5/8/20

at 22:00;  Stop 5/9/20 at 21:59;  Status DC


Metoclopramide HCl (Reglan Vial) 10 mg PRN Q3HRS  PRN IVP NAUSEA/VOMITING-3rd 

choice Last administered on 5/14/20at 04:25;  Start 5/9/20 at 16:45


Potassium Chloride 75 meq/ Magnesium Sulfate 15 meq/ Multivitamins 10 

ml/Chromium/ Copper/Manganese/ Seleni/Zn 0.5 ml/ Insulin Human Regular 15 unit/ 

Total Parenteral Nutrition/Amino Acids/Dextrose/ Fat Emulsion Intravenous 1,920 

ml @  80 mls/hr TPN  CONT IV  Last administered on 5/9/20at 22:41;  Start 5/9/20

at 22:00;  Stop 5/10/20 at 21:59;  Status DC


Magnesium Sulfate 50 ml @ 25 mls/hr 1X  ONCE IV  Last administered on 5/10/20at 

10:44;  Start 5/10/20 at 09:00;  Stop 5/10/20 at 10:59;  Status DC


Potassium Chloride/Water 100 ml @  100 mls/hr 1X  ONCE IV  Last administered on 

5/10/20at 09:37;  Start 5/10/20 at 09:00;  Stop 5/10/20 at 09:59;  Status DC


Duloxetine HCl (Cymbalta) 30 mg DAILY PO  Last administered on 5/11/20at 09:48; 

Start 5/10/20 at 14:00;  Stop 5/13/20 at 10:25;  Status DC


Potassium Chloride 80 meq/ Magnesium Sulfate 20 meq/ Multivitamins 10 

ml/Chromium/ Copper/Manganese/ Seleni/Zn 0.5 ml/ Insulin Human Regular 15 unit/ 

Total Parenteral Nutrition/Amino Acids/Dextrose/ Fat Emulsion Intravenous 1,920 

ml @  80 mls/hr TPN  CONT IV  Last administered on 5/10/20at 21:42;  Start 

5/10/20 at 22:00;  Stop 5/11/20 at 21:59;  Status DC


Potassium Chloride 80 meq/ Magnesium Sulfate 20 meq/ Multivitamins 10 

ml/Chromium/ Copper/Manganese/ Seleni/Zn 0.5 ml/ Insulin Human Regular 15 unit/ 

Total Parenteral Nutrition/Amino Acids/Dextrose/ Fat Emulsion Intravenous 1,920 

ml @  80 mls/hr TPN  CONT IV  Last administered on 5/11/20at 22:20;  Start 

5/11/20 at 22:00;  Stop 5/12/20 at 21:59;  Status DC


Lidocaine HCl (Buffered Lidocaine 1%) 3 ml STK-MED ONCE .ROUTE ;  Start 5/12/20 

at 09:54;  Stop 5/12/20 at 09:55;  Status DC


Hydromorphone HCl (Dilaudid Standard PCA) 12 mg STK-MED ONCE IV ;  Start 5/1/20 

at 15:50;  Stop 5/12/20 at 11:24;  Status DC


Potassium Chloride 80 meq/ Magnesium Sulfate 20 meq/ Multivitamins 10 

ml/Chromium/ Copper/Manganese/ Seleni/Zn 0.5 ml/ Insulin Human Regular 15 unit/ 

Total Parenteral Nutrition/Amino Acids/Dextrose/ Fat Emulsion Intravenous 1,920 

ml @  80 mls/hr TPN  CONT IV  Last administered on 5/12/20at 21:40;  Start 

5/12/20 at 22:00;  Stop 5/13/20 at 21:59;  Status DC


Lidocaine HCl (Buffered Lidocaine 1%) 6 ml 1X  ONCE INJ  Last administered on 

5/12/20at 14:15;  Start 5/12/20 at 14:15;  Stop 5/12/20 at 14:16;  Status DC


Potassium Chloride 80 meq/ Magnesium Sulfate 20 meq/ Multivitamins 10 

ml/Chromium/ Copper/Manganese/ Seleni/Zn 1 ml/ Insulin Human Regular 15 unit/ 

Total Parenteral Nutrition/Amino Acids/Dextrose/ Fat Emulsion Intravenous 1,920 

ml @  80 mls/hr TPN  CONT IV  Last administered on 5/13/20at 22:04;  Start 

5/13/20 at 22:00;  Stop 5/14/20 at 21:59;  Status DC


Potassium Chloride/Water 100 ml @  100 mls/hr 1X  ONCE IV  Last administered on 

5/14/20at 11:34;  Start 5/14/20 at 11:00;  Stop 5/14/20 at 11:59;  Status DC


Potassium Chloride 90 meq/ Magnesium Sulfate 20 meq/ Multivitamins 10 

ml/Chromium/ Copper/Manganese/ Seleni/Zn 1 ml/ Insulin Human Regular 15 unit/ 

Total Parenteral Nutrition/Amino Acids/Dextrose/ Fat Emulsion Intravenous 1,920 

ml @  80 mls/hr TPN  CONT IV  Last administered on 5/14/20at 22:57;  Start 

5/14/20 at 22:00;  Stop 5/15/20 at 21:59;  Status DC


Potassium Chloride 90 meq/ Magnesium Sulfate 20 meq/ Multivitamins 10 

ml/Chromium/ Copper/Manganese/ Seleni/Zn 1 ml/ Insulin Human Regular 15 unit/ 

Total Parenteral Nutrition/Amino Acids/Dextrose/ Fat Emulsion Intravenous 1,920 

ml @  80 mls/hr TPN  CONT IV  Last administered on 5/15/20at 22:48;  Start 

5/15/20 at 22:00;  Stop 5/16/20 at 21:59;  Status DC


Potassium Chloride 90 meq/ Magnesium Sulfate 20 meq/ Multivitamins 10 

ml/Chromium/ Copper/Manganese/ Seleni/Zn 1 ml/ Insulin Human Regular 15 unit/ 

Total Parenteral Nutrition/Amino Acids/Dextrose/ Fat Emulsion Intravenous 1,890 

ml @  78.75 mls/ hr TPN  CONT IV  Last administered on 5/16/20at 22:15;  Start 

5/16/20 at 22:00;  Stop 5/17/20 at 21:59;  Status DC


Linezolid/Dextrose 300 ml @  300 mls/hr Q12HR IV  Last administered on 5/19/20at

21:08;  Start 5/17/20 at 09:00;  Stop 5/20/20 at 08:11;  Status DC


Daptomycin 450 mg/ Sodium Chloride 50 ml @  100 mls/hr Q24H IV  Last 

administered on 5/20/20at 09:25;  Start 5/17/20 at 09:00;  Stop 5/21/20 at 08:

30;  Status DC


Potassium Chloride 90 meq/ Magnesium Sulfate 20 meq/ Multivitamins 10 

ml/Chromium/ Copper/Manganese/ Seleni/Zn 1 ml/ Insulin Human Regular 15 unit/ 

Total Parenteral Nutrition/Amino Acids/Dextrose/ Fat Emulsion Intravenous 1,890 

ml @  78.75 mls/ hr TPN  CONT IV  Last administered on 5/17/20at 21:34;  Start 

5/17/20 at 22:00;  Stop 5/18/20 at 21:59;  Status DC


Lorazepam (Ativan Inj) 2 mg STK-MED ONCE .ROUTE ;  Start 5/17/20 at 14:58;  Stop

5/17/20 at 14:58;  Status DC


Metoprolol Tartrate (Lopressor Vial) 5 mg 1X  ONCE IVP  Last administered on 

5/17/20at 15:31;  Start 5/17/20 at 15:15;  Stop 5/17/20 at 15:16;  Status DC


Lorazepam (Ativan Inj) 2 mg 1X  ONCE IVP  Last administered on 5/17/20at 15:30; 

Start 5/17/20 at 15:15;  Stop 5/17/20 at 15:16;  Status DC


Enoxaparin Sodium (Lovenox 40mg Syringe) 40 mg Q24H SQ  Last administered on 

5/22/20at 18:05;  Start 5/17/20 at 17:00


Lorazepam (Ativan Inj) 1 mg PRN Q4HRS  PRN IVP ANXIETY / AGITATION MILD-MOD Last

administered on 5/23/20at 04:21;  Start 5/17/20 at 19:15


Lorazepam (Ativan Inj) 2 mg PRN Q4HRS  PRN IVP ANXIETY / AGITATION SEVERE Last 

administered on 5/18/20at 22:20;  Start 5/17/20 at 19:15


Fentanyl Citrate (Fentanyl 2ml Vial) 50 mcg PRN Q4HRS  PRN IVP SEVERE PAIN Last 

administered on 5/20/20at 05:00;  Start 5/18/20 at 13:15


Fentanyl Citrate (Fentanyl 2ml Vial) 25 mcg PRN Q4HRS  PRN IVP MODERATE PAIN 

Last administered on 5/23/20at 10:34;  Start 5/18/20 at 13:15


Potassium Chloride 90 meq/ Magnesium Sulfate 20 meq/ Multivitamins 10 

ml/Chromium/ Copper/Manganese/ Seleni/Zn 1 ml/ Insulin Human Regular 15 unit/ 

Total Parenteral Nutrition/Amino Acids/Dextrose/ Fat Emulsion Intravenous 1,890 

ml @  78.75 mls/ hr TPN  CONT IV  Last administered on 5/18/20at 22:18;  Start 

5/18/20 at 22:00;  Stop 5/19/20 at 21:59;  Status DC


Furosemide (Lasix) 40 mg 1X  ONCE IVP  Last administered on 5/18/20at 21:51;  

Start 5/18/20 at 21:45;  Stop 5/18/20 at 21:48;  Status DC


Albumin Human 100 ml @  100 mls/hr 1X PRN  PRN IV SEE COMMENTS;  Start 5/19/20 

at 01:30


Furosemide (Lasix) 40 mg [id number] IVP  Last administered on 5/23/20at 10:34;  Start

5/19/20 at 14:00


Potassium Chloride 90 meq/ Magnesium Sulfate 20 meq/ Multivitamins 10 

ml/Chromium/ Copper/Manganese/ Seleni/Zn 1 ml/ Insulin Human Regular 15 unit/ 

Total Parenteral Nutrition/Amino Acids/Dextrose/ Fat Emulsion Intravenous 1,800 

ml @  75 mls/hr TPN  CONT IV  Last administered on 5/19/20at 22:31;  Start 

5/19/20 at 22:00;  Stop 5/20/20 at 21:59;  Status DC


Potassium Chloride 90 meq/ Magnesium Sulfate 20 meq/ Multivitamins 10 ml/C

hromium/ Copper/Manganese/ Seleni/Zn 1 ml/ Insulin Human Regular 15 unit/ Total 

Parenteral Nutrition/Amino Acids/Dextrose/ Fat Emulsion Intravenous 1,800 ml @  

75 mls/hr TPN  CONT IV  Last administered on 5/20/20at 22:28;  Start 5/20/20 at 

22:00;  Stop 5/21/20 at 21:59;  Status DC


Potassium Chloride 110 meq/ Magnesium Sulfate 20 meq/ Multivitamins 10 

ml/Chromium/ Copper/Manganese/ Seleni/Zn 1 ml/ Insulin Human Regular 15 unit/ 

Total Parenteral Nutrition/Amino Acids/Dextrose/ Fat Emulsion Intravenous 1,800 

ml @  75 mls/hr TPN  CONT IV  Last administered on 5/21/20at 22:01;  Start 

5/21/20 at 22:00;  Stop 5/22/20 at 21:59;  Status DC


Saliva Substitute (Biotene Moisturizing Mouth) 2 spray PRN Q15MIN  PRN PO DRY 

MOUTH;  Start 5/21/20 at 11:00


Potassium Chloride 110 meq/ Magnesium Sulfate 20 meq/ Multivitamins 10 

ml/Chromium/ Copper/Manganese/ Seleni/Zn 1 ml/ Insulin Human Regular 15 unit/ 

Total Parenteral Nutrition/Amino Acids/Dextrose/ Fat Emulsion Intravenous 1,800 

ml @  75 mls/hr TPN  CONT IV  Last administered on 5/22/20at 22:21;  Start 

5/22/20 at 22:00;  Stop 5/23/20 at 21:59


Potassium Chloride 110 meq/ Magnesium Sulfate 20 meq/ Multivitamins 10 

ml/Chromium/ Copper/Manganese/ Seleni/Zn 1 ml/ Insulin Human Regular 15 unit/ 

Total Parenteral Nutrition/Amino Acids/Dextrose/ Fat Emulsion Intravenous 1,800 

ml @  75 mls/hr TPN  CONT IV ;  Start 5/23/20 at 22:00;  Stop 5/24/20 at 21:59





Active Scripts


Active


Reported


Bisoprolol Fumarate 5 Mg Tablet 10 Mg PO DAILY


Vitals/I & O





Vital Sign - Last 24 Hours








 5/22/20 5/22/20 5/22/20 5/22/20





 14:00 14:24 15:00 15:00


 


Pulse 103  99 


 


Resp 26 30 24 24


 


B/P (MAP) 149/80 (103)  149/82 (104) 


 


Pulse Ox 100 99 100 99


 


O2 Delivery Tracheal Collar Tracheal Collar Tracheal Collar Tracheal Collar


 


O2 Flow Rate 8.0 8.0 8.0 8.0





 5/22/20 5/22/20 5/22/20 5/22/20





 15:51 16:00 17:00 18:00


 


Temp  99.0  





  99.0  


 


Pulse  92 122 105


 


Resp  22 32 28


 


B/P (MAP)  126/63 (84) 129/78 (95) 136/79 (98)


 


Pulse Ox  100 100 100


 


O2 Delivery Trach Collar Tracheal Collar Tracheal Collar Tracheal Collar


 


O2 Flow Rate 8.0 8.0 8.0 8.0


 


    





    





 5/22/20 5/22/20 5/22/20 5/22/20





 18:05 18:38 19:00 20:00


 


Pulse   100 


 


Resp 36 28 26 


 


B/P (MAP)   126/67 (86) 


 


Pulse Ox 99 99 100 


 


O2 Delivery Tracheal Collar Tracheal Collar Tracheal Collar Trach Collar


 


O2 Flow Rate 8.0 8.0 8.0 8.0





 5/22/20 5/22/20 5/22/20 5/22/20





 20:00 21:00 22:00 22:22


 


Temp 98.7   





 98.7   


 


Pulse 111 110 83 


 


Resp 23 21 23 26


 


B/P (MAP) 117/86 (96) 123/65 (84) 104/71 (82) 


 


Pulse Ox 100 98 99 100


 


O2 Delivery Tracheal Collar Tracheal Collar Tracheal Collar Tracheal Collar


 


O2 Flow Rate 8.0 8.0 8.0 


 


    





    





 5/22/20 5/22/20 5/23/20 5/23/20





 22:52 23:00 00:00 00:00


 


Temp    97.9





    97.9


 


Pulse  94  92


 


Resp 20 21  20


 


B/P (MAP)  122/77 (92)  103/59 (74)


 


Pulse Ox 100 98  100


 


O2 Delivery Tracheal Collar Tracheal Collar Trach Collar Tracheal Collar


 


O2 Flow Rate 8.0 8.0 8.0 8.0


 


    





    





 5/23/20 5/23/20 5/23/20 5/23/20





 01:00 02:00 02:40 03:00


 


Pulse 99 93  88


 


Resp 24 20 23 23


 


B/P (MAP) 100/57 (71) 104/73 (83)  107/65 (79)


 


Pulse Ox 99 99 99 98


 


O2 Delivery Tracheal Collar Tracheal Collar Tracheal Collar Tracheal Collar


 


O2 Flow Rate 8.0 8.0  8.0





 5/23/20 5/23/20 5/23/20 5/23/20





 03:10 04:00 04:00 05:00


 


Temp  98.3  





  98.3  


 


Pulse  87  88


 


Resp 25 21  28


 


B/P (MAP)  151/75 (100)  87/49 (62)


 


Pulse Ox 99 100  100


 


O2 Delivery Tracheal Collar Tracheal Collar Trach Collar Tracheal Collar


 


O2 Flow Rate  8.0 8.0 8.0


 


    





    





 5/23/20 5/23/20 5/23/20 5/23/20





 06:00 07:00 08:00 08:00


 


Temp   98.5 





   98.5 


 


Pulse 78 74 76 


 


Resp 22 20 17 


 


B/P (MAP) 90/61 (71) 102/60 (74) 108/58 (75) 


 


Pulse Ox 100 99 98 


 


O2 Delivery Tracheal Collar Tracheal Collar Tracheal Collar Trach Collar


 


O2 Flow Rate 8.0 8.0 8.0 8.0


 


    





    





 5/23/20 5/23/20 5/23/20 5/23/20





 09:00 10:00 10:34 11:00


 


Pulse 80 91  108


 


Resp 24 23 24 23


 


B/P (MAP) 111/62 (78) 115/68 (84)  100/63 (75)


 


Pulse Ox 96 99 98 97


 


O2 Delivery Tracheal Collar Tracheal Collar Tracheal Collar Tracheal Collar


 


O2 Flow Rate 8.0 8.0  8.0





 5/23/20 5/23/20  





 11:04 12:00  


 


Resp 21   


 


Pulse Ox 98   


 


O2 Delivery Tracheal Collar Trach Collar  


 


O2 Flow Rate 8.0 8.0  














Intake and Output   


 


 5/22/20 5/22/20 5/23/20





 15:00 23:00 07:00


 


Intake Total 265 ml 990.26 ml 1663 ml


 


Output Total 1625 ml 1040 ml 375 ml


 


Balance -1360 ml -49.74 ml 1288 ml











Hemodynamically unstable?:  No


Is patient in severe pain?:  No


Is NPO status required?:  Yes











MG ARGUETA MD                 May 23, 2020 13:46

## 2020-05-23 NOTE — PDOC
Infectious Disease Note


Subjective


Subjective


Trach shield, FiO2 33%


No fevers last 24 hrs





Vital Sign


Vital Signs





Vital Signs








  Date Time  Temp Pulse Resp B/P (MAP) Pulse Ox O2 Delivery O2 Flow Rate FiO2


 


5/23/20 08:00      Trach Collar 8.0 


 


5/23/20 06:00  78 22 90/61 (71) 100   


 


5/23/20 04:00 98.3       





 98.3       











Physical Exam


PHYSICAL EXAM


GENERAL: Propped up in bed, arouses to name 


HEENT:  oral cavity dry, NGT


NECK:  Trach shield


LUNGS: Clear anteriorly, no accessory muscle use 


HEART:  S1, S2, regular 


ABDOMEN: mod distention, hypoactive BS, tender, + drains x 3


: [place] (4/14)


EXTREMITIES: Generalized edema, improving, no cyanosis, SCDs bilaterally 


SKIN: Warm and dry.  No generalized rash.  


CNS: Very weak 


RUE-PICC (4/30) clean





Labs


Lab





Laboratory Tests








Test


 5/22/20


11:25 5/22/20


18:00 5/23/20


00:29 5/23/20


06:06


 


Glucose (Fingerstick)


 157 mg/dL


(70-99) 155 mg/dL


(70-99) 144 mg/dL


(70-99) 163 mg/dL


(70-99)








Micro


5/4. BLOOD CULTURE  Preliminary  


        NO GROWTH AFTER 5 DAYS 








4/27. abd fluid


  AEROBIC RES 1  Final  


        Organism Identification, Yeast


          Candida parapsilosis








5/6. abd fluid


     ANAEROBIC-AEROBIC CULTURE  


                    PENDING





Objective


Assessment


Fever intermittent could be from underlying pancreatitis - better 


? Ileus with vomiting


Abd distention - U/S and CT reviewed s/p 0.4 L of opaque, debris-containing 

ascites was removed 5/6


Acute pancreatitis with persistent necrosis


  - 4/27 status post ROBERT drain placement + C paropsilosis; 5/6 + yeast & high 

amylase; s/p additional drains on 5/8


Anemia - S/p PRBCs


Cholelithiasis with thickening of the gallbladder wall.


Leucocytosis improved 


JED, hyperkalemia, Metabolic acidosis off dialysis


Acute hypoxic resp failure ,bilateral pleural effusion and atelectasis


hypocalcemia 


Prediabetes


HTN


s/p trach





Plan


Plan of Care


cont  Micafungin


Off dapto/zyvox/merrem


Maintain aspiration precaution


Supportive care


Attending Co-Sign


The patient was seen and interviewed as well as examined at the bedside. The 

chart was reviewed. The case was discussed. Agree with the plan of care.











HAVEN WINTERS        May 23, 2020 08:53


IVAN FRANZ MD           May 23, 2020 11:50

## 2020-05-23 NOTE — NUR
Patient taken to shower, sat in shower chair. Cleaned with PT/OT. Patient used walker to 
ambulate to and from room.

Patient's daughter came in room and visited for ~1hr. Patient taken outside in recliner, sat 
outside for 30 min, Darion Bansal. 



Patient is back in bed, asleep at this time. Report given to Shahana CALL.

## 2020-05-23 NOTE — PDOC
PULMONARY PROGRESS NOTES


Subjective


alert, is tired, tm, 30%, small ett secretion, 





Patient intubated on 3/23 , s/p trach 4/6,


Vitals





Vital Signs








  Date Time  Temp Pulse Resp B/P (MAP) Pulse Ox O2 Delivery O2 Flow Rate FiO2


 


5/23/20 11:04   21  98 Tracheal Collar 8.0 


 


5/23/20 11:00  108  100/63 (75)    


 


5/23/20 08:00 98.5       





 98.5       








ROS:  No Chest Pain, No Abdominal Pain, No Increase Cough


General:  Alert, No acute distress


HEENT:  Other (nc at perrl     neck trach site ok  no lad  no thyromegaly)


Lungs:  Wheezing, Other (decrease bs)


Cardiovascular:  S1, S2


Abdomen:  Soft, Non-tender, Other (distended)


Neuro Exam:  Alert


Extremities:  Other (+3 generalized edema )


Skin:  Warm, Dry


Labs





Laboratory Tests








Test


 5/21/20


12:06 5/22/20


00:39 5/22/20


06:04 5/22/20


08:40


 


Glucose (Fingerstick)


 192 mg/dL


(70-99) 149 mg/dL


(70-99) 164 mg/dL


(70-99) 





 


White Blood Count


 


 


 


 8.8 x10^3/uL


(4.0-11.0)


 


Red Blood Count


 


 


 


 2.77 x10^6/uL


(3.50-5.40)


 


Hemoglobin


 


 


 


 7.9 g/dL


(12.0-15.5)


 


Hematocrit


 


 


 


 24.4 %


(36.0-47.0)


 


Mean Corpuscular Volume    88 fL () 


 


Mean Corpuscular Hemoglobin    29 pg (25-35) 


 


Mean Corpuscular Hemoglobin


Concent 


 


 


 33 g/dL


(31-37)


 


Red Cell Distribution Width


 


 


 


 18.7 %


(11.5-14.5)


 


Platelet Count


 


 


 


 348 x10^3/uL


(140-400)


 


Sodium Level


 


 


 


 140 mmol/L


(136-145)


 


Potassium Level


 


 


 


 4.3 mmol/L


(3.5-5.1)


 


Chloride Level


 


 


 


 99 mmol/L


()


 


Carbon Dioxide Level


 


 


 


 36 mmol/L


(21-32)


 


Anion Gap    5 (6-14) 


 


Blood Urea Nitrogen


 


 


 


 22 mg/dL


(7-20)


 


Creatinine


 


 


 


 0.6 mg/dL


(0.6-1.0)


 


Estimated GFR


(Cockcroft-Gault) 


 


 


 106.3 





 


Glucose Level


 


 


 


 126 mg/dL


(70-99)


 


Calcium Level


 


 


 


 9.3 mg/dL


(8.5-10.1)


 


Phosphorus Level


 


 


 


 3.7 mg/dL


(2.6-4.7)


 


Magnesium Level


 


 


 


 2.1 mg/dL


(1.8-2.4)


 


Test


 5/22/20


11:25 5/22/20


18:00 5/23/20


00:29 5/23/20


06:06


 


Glucose (Fingerstick)


 157 mg/dL


(70-99) 155 mg/dL


(70-99) 144 mg/dL


(70-99) 163 mg/dL


(70-99)








Laboratory Tests








Test


 5/22/20


11:25 5/22/20


18:00 5/23/20


00:29 5/23/20


06:06


 


Glucose (Fingerstick)


 157 mg/dL


(70-99) 155 mg/dL


(70-99) 144 mg/dL


(70-99) 163 mg/dL


(70-99)








Medications





Active Scripts








 Medications  Dose


 Route/Sig


 Max Daily Dose Days Date Category


 


 Bisoprolol


 Fumarate 5 Mg


 Tablet  10 Mg


 PO DAILY


   3/16/20 Reported








Comments


CXR  reviewed.





Impression


.


IMPRESSION:


1.  Acute hypoxemic respiratory failure secondary to ARDS status post trach,


2.  Gallstone pancreatitis


3.  Severe metabolic acidosis.stable


4.  Acute kidney injury-stable, Off HD-- continue to improve 


5.  Acute gallstone pancreatitis.


6.  Hypoalbuminemia.


7.  Moderate persistent effusions, s/p left thora  5/12


8.  Fever-  Per ID, per surgery--resolved 


9.  Chronic anemia


10. Covid 19 testing negative


11. Moderate to large ascites-S/P paracentisis


12.S/P paracentisis with 4 liters removed on 4/15/20


13. S/P IR drain placement on 5/8/2020





Plan


.


Continue current support


Continue the same





Lasix 40 mg twice daily





Patient placed on assist control last evening for respiratory distress


s/p  thoracentesis, 5/12, 3 litres removed


cap trach as tolerated


Follow surgery recs-- S/P 3 drain placed in IR on 5/8/2020


Follow ID recs for [id number]


Follow nephrology recs 


Continue TPN 


DVT/GI PPX: heparin SQ/ protonix 


D/W RN and RT, pt





CODE:MAN VÁSQUEZ MD               May 23, 2020 11:24

## 2020-05-23 NOTE — PDOC
Provider Note


Provider Note


SURG





pt out of room for a shower


oldest daughter at the bedside





continue supportive care





Hemodynamically unstable?:  No


Is patient in severe pain?:  No


Is NPO status required?:  Yes











KIEL BAL MD                May 23, 2020 12:07

## 2020-05-24 VITALS — SYSTOLIC BLOOD PRESSURE: 89 MMHG | DIASTOLIC BLOOD PRESSURE: 55 MMHG

## 2020-05-24 VITALS — DIASTOLIC BLOOD PRESSURE: 62 MMHG | SYSTOLIC BLOOD PRESSURE: 98 MMHG

## 2020-05-24 VITALS — DIASTOLIC BLOOD PRESSURE: 54 MMHG | SYSTOLIC BLOOD PRESSURE: 101 MMHG

## 2020-05-24 VITALS — DIASTOLIC BLOOD PRESSURE: 91 MMHG | SYSTOLIC BLOOD PRESSURE: 180 MMHG

## 2020-05-24 VITALS — SYSTOLIC BLOOD PRESSURE: 114 MMHG | DIASTOLIC BLOOD PRESSURE: 55 MMHG

## 2020-05-24 VITALS — SYSTOLIC BLOOD PRESSURE: 100 MMHG | DIASTOLIC BLOOD PRESSURE: 54 MMHG

## 2020-05-24 VITALS — SYSTOLIC BLOOD PRESSURE: 128 MMHG | DIASTOLIC BLOOD PRESSURE: 76 MMHG

## 2020-05-24 VITALS — SYSTOLIC BLOOD PRESSURE: 96 MMHG | DIASTOLIC BLOOD PRESSURE: 54 MMHG

## 2020-05-24 VITALS — SYSTOLIC BLOOD PRESSURE: 159 MMHG | DIASTOLIC BLOOD PRESSURE: 77 MMHG

## 2020-05-24 VITALS — DIASTOLIC BLOOD PRESSURE: 73 MMHG | SYSTOLIC BLOOD PRESSURE: 127 MMHG

## 2020-05-24 VITALS — SYSTOLIC BLOOD PRESSURE: 157 MMHG | DIASTOLIC BLOOD PRESSURE: 79 MMHG

## 2020-05-24 VITALS — DIASTOLIC BLOOD PRESSURE: 56 MMHG | SYSTOLIC BLOOD PRESSURE: 106 MMHG

## 2020-05-24 VITALS — DIASTOLIC BLOOD PRESSURE: 59 MMHG | SYSTOLIC BLOOD PRESSURE: 108 MMHG

## 2020-05-24 VITALS — DIASTOLIC BLOOD PRESSURE: 48 MMHG | SYSTOLIC BLOOD PRESSURE: 101 MMHG

## 2020-05-24 VITALS — DIASTOLIC BLOOD PRESSURE: 72 MMHG | SYSTOLIC BLOOD PRESSURE: 151 MMHG

## 2020-05-24 VITALS — SYSTOLIC BLOOD PRESSURE: 129 MMHG | DIASTOLIC BLOOD PRESSURE: 67 MMHG

## 2020-05-24 VITALS — DIASTOLIC BLOOD PRESSURE: 55 MMHG | SYSTOLIC BLOOD PRESSURE: 104 MMHG

## 2020-05-24 VITALS — DIASTOLIC BLOOD PRESSURE: 54 MMHG | SYSTOLIC BLOOD PRESSURE: 96 MMHG

## 2020-05-24 VITALS — DIASTOLIC BLOOD PRESSURE: 52 MMHG | SYSTOLIC BLOOD PRESSURE: 96 MMHG

## 2020-05-24 VITALS — DIASTOLIC BLOOD PRESSURE: 73 MMHG | SYSTOLIC BLOOD PRESSURE: 129 MMHG

## 2020-05-24 VITALS — DIASTOLIC BLOOD PRESSURE: 49 MMHG | SYSTOLIC BLOOD PRESSURE: 100 MMHG

## 2020-05-24 VITALS — DIASTOLIC BLOOD PRESSURE: 69 MMHG | SYSTOLIC BLOOD PRESSURE: 147 MMHG

## 2020-05-24 VITALS — DIASTOLIC BLOOD PRESSURE: 73 MMHG | SYSTOLIC BLOOD PRESSURE: 147 MMHG

## 2020-05-24 VITALS — SYSTOLIC BLOOD PRESSURE: 146 MMHG | DIASTOLIC BLOOD PRESSURE: 111 MMHG

## 2020-05-24 LAB
ALBUMIN SERPL-MCNC: 2.4 G/DL (ref 3.4–5)
ALBUMIN/GLOB SERPL: 0.5 {RATIO} (ref 1–1.7)
ALP SERPL-CCNC: 131 U/L (ref 46–116)
ALT SERPL-CCNC: 25 U/L (ref 14–59)
ANION GAP SERPL CALC-SCNC: 4 MMOL/L (ref 6–14)
AST SERPL-CCNC: 34 U/L (ref 15–37)
BILIRUB SERPL-MCNC: 0.7 MG/DL (ref 0.2–1)
BUN SERPL-MCNC: 23 MG/DL (ref 7–20)
BUN/CREAT SERPL: 29 (ref 6–20)
CALCIUM SERPL-MCNC: 10.1 MG/DL (ref 8.5–10.1)
CHLORIDE SERPL-SCNC: 98 MMOL/L (ref 98–107)
CO2 SERPL-SCNC: 34 MMOL/L (ref 21–32)
CREAT SERPL-MCNC: 0.8 MG/DL (ref 0.6–1)
ERYTHROCYTE [DISTWIDTH] IN BLOOD BY AUTOMATED COUNT: 19 % (ref 11.5–14.5)
GFR SERPLBLD BASED ON 1.73 SQ M-ARVRAT: 76.2 ML/MIN
GLOBULIN SER-MCNC: 4.5 G/DL (ref 2.2–3.8)
GLUCOSE SERPL-MCNC: 131 MG/DL (ref 70–99)
HCT VFR BLD CALC: 25.5 % (ref 36–47)
HGB BLD-MCNC: 8.5 G/DL (ref 12–15.5)
MCH RBC QN AUTO: 29 PG (ref 25–35)
MCHC RBC AUTO-ENTMCNC: 34 G/DL (ref 31–37)
MCV RBC AUTO: 87 FL (ref 79–100)
PLATELET # BLD AUTO: 317 X10^3/UL (ref 140–400)
POTASSIUM SERPL-SCNC: 4.8 MMOL/L (ref 3.5–5.1)
PROT SERPL-MCNC: 6.9 G/DL (ref 6.4–8.2)
RBC # BLD AUTO: 2.94 X10^6/UL (ref 3.5–5.4)
SODIUM SERPL-SCNC: 136 MMOL/L (ref 136–145)
WBC # BLD AUTO: 16 X10^3/UL (ref 4–11)

## 2020-05-24 RX ADMIN — Medication PRN EACH: at 12:28

## 2020-05-24 RX ADMIN — DEXMEDETOMIDINE HYDROCHLORIDE PRN MLS/HR: 100 INJECTION, SOLUTION, CONCENTRATE INTRAVENOUS at 23:35

## 2020-05-24 RX ADMIN — ENOXAPARIN SODIUM SCH MG: 40 INJECTION SUBCUTANEOUS at 16:42

## 2020-05-24 RX ADMIN — FENTANYL CITRATE PRN MCG: 50 INJECTION INTRAMUSCULAR; INTRAVENOUS at 06:36

## 2020-05-24 RX ADMIN — FENTANYL CITRATE PRN MCG: 50 INJECTION INTRAMUSCULAR; INTRAVENOUS at 16:42

## 2020-05-24 RX ADMIN — DEXTRAN 70, GLYCERIN, HYPROMELLOSE PRN DROP: 1; 2; 3 SOLUTION/ DROPS OPHTHALMIC at 11:15

## 2020-05-24 RX ADMIN — INSULIN LISPRO SCH UNITS: 100 INJECTION, SOLUTION INTRAVENOUS; SUBCUTANEOUS at 05:48

## 2020-05-24 RX ADMIN — DEXMEDETOMIDINE HYDROCHLORIDE PRN MLS/HR: 100 INJECTION, SOLUTION, CONCENTRATE INTRAVENOUS at 03:33

## 2020-05-24 RX ADMIN — PANTOPRAZOLE SODIUM SCH MG: 40 INJECTION, POWDER, FOR SOLUTION INTRAVENOUS at 11:15

## 2020-05-24 RX ADMIN — INSULIN LISPRO SCH UNITS: 100 INJECTION, SOLUTION INTRAVENOUS; SUBCUTANEOUS at 23:42

## 2020-05-24 RX ADMIN — FENTANYL CITRATE PRN MCG: 50 INJECTION INTRAMUSCULAR; INTRAVENOUS at 11:16

## 2020-05-24 RX ADMIN — FUROSEMIDE SCH MG: 10 INJECTION, SOLUTION INTRAMUSCULAR; INTRAVENOUS at 11:16

## 2020-05-24 RX ADMIN — FENTANYL CITRATE PRN MCG: 50 INJECTION INTRAMUSCULAR; INTRAVENOUS at 20:38

## 2020-05-24 RX ADMIN — INSULIN LISPRO SCH UNITS: 100 INJECTION, SOLUTION INTRAVENOUS; SUBCUTANEOUS at 16:42

## 2020-05-24 RX ADMIN — FUROSEMIDE SCH MG: 10 INJECTION, SOLUTION INTRAMUSCULAR; INTRAVENOUS at 16:41

## 2020-05-24 RX ADMIN — BACITRACIN SCH MLS/HR: 5000 INJECTION, POWDER, FOR SOLUTION INTRAMUSCULAR at 13:04

## 2020-05-24 RX ADMIN — INSULIN LISPRO SCH UNITS: 100 INJECTION, SOLUTION INTRAVENOUS; SUBCUTANEOUS at 12:00

## 2020-05-24 RX ADMIN — Medication PRN EACH: at 08:47

## 2020-05-24 RX ADMIN — ONDANSETRON PRN MG: 2 INJECTION INTRAMUSCULAR; INTRAVENOUS at 04:36

## 2020-05-24 RX ADMIN — DEXTROSE SCH MLS/HR: 50 INJECTION, SOLUTION INTRAVENOUS at 12:58

## 2020-05-24 RX ADMIN — DEXMEDETOMIDINE HYDROCHLORIDE PRN MLS/HR: 100 INJECTION, SOLUTION, CONCENTRATE INTRAVENOUS at 12:58

## 2020-05-24 NOTE — NUR
Pharmacy TPN Dosing Note



S: JESENIA BEAN is a 49 year old F Currently receiving Central Continuous TPN started 
03/18/20



B:Pertinent PMH: Necrotizing pancreatitis



Height: 5 feet, 8 inches

Weight: 78.6 kg



Current diet: NPO 



LABS:

Sodium:    136 

Potassium: 4.8 

Chloride:  98 

Calcium:   10.1 

Corrected Calcium: 11.38 

Magnesium: 2.1 

CO2:       34 

SCr:       0.8 

Glucose:   150, 131 

Albumin:   2.4 

AST:       34 

ALT:       25 



TPN FORMULA:

TPN TYPE:  Central Continuous

AMINO ACIDS:         70 gm

DEXTROSE:            250 gm

LIPIDS:              30 gm

POTASSIUM CHLORIDE:  110 mEq

MAGNESIUM:           20 mEq

INSULIN:             15 units

MULTIPLE VITAMIN:    10 ml

TRACE ELEMENTS:      1 ml



TPN PLAN:  

-Electrolytes stable, no changes to TPN.

-CMP, mag, phos, TG tomorrow per protocol.





R: Continue TPN @ 75 ml/hr and above formula. 

Will monitor electrolytes, glucose, and tolerance to TPN.



 VALERIE SNELL [facility], 05/24/20 1979

## 2020-05-24 NOTE — NUR
Patient taken outside in recliner. Stayed outside in recliner for 20+ minutes. Patient 
bathed and stayed in recliner for multiple hours. 

Order received from Dr. Santos to clamp NG tube. NG clamped for 1.5 hours and patient 
started having small amount of green vomit, NG tube put to LIS once again and thin, green 
stomach contents flowed into canister. Patient left on LIS at this time-- less nauseated. 



Patient's BF, Rolf, came to visit this evening. Anxiety at much better level this shift than 
previous. 



See assessments, VS.

## 2020-05-24 NOTE — PDOC
PROGRESS NOTES


Chief Complaint


Chief Complaint


Acute hypoxic Respiratory failure requiring mechanical ventilation (now 

extubated for several days but still with tracheostomy)


Tracheostomy


bilateral pleural effusions/pulm edema 


Sepsis


Severe Acute gallstone pancreatitis (not a surgical candidate at this time) with

necrosis


Acute kidney failure now requiring dialysis


Salpingitis


Gallstones (Calculus of gallbladder with acute cholecystitis without 

obstruction)


HTN 


Leukocytosis 


Hypoxia


Uterine fibroid


Intractable pain


Intractable nausea


Covid 19 negative. 


Acute on chronic anemia 


EEG: No seizure activityFever  - better currently - intermittent could be from 

underlying pancreatitis blood cults 5/4 - neg so far


? Ileus with vomiting


Abd distention - U/S and CT reviewed s/p 0.4 L of opaque, debris-containing 

ascites was removed 5/6


Acute pancreatitis with persistent necrosis


- 4/27 status post ROBERT drain placement + C paropsilosis. s/p additional drains 

5/8


Anemia - S/p PRBCs


Cholelithiasis with thickening of the gallbladder wall.


Leucocytosis improving


JED, hyperkalemia, Metabolic acidosis off dialysis


Acute hypoxic resp failure ,bilateral pleural effusion and atelectasis


hypocalcemia 


Prediabetes


HTN


s/p trach


ESRD on HD


Hyperglycemia





History of Present Illness


History of Present Illness


5/24,.    feels well,  has been out of room in wheelchair


no complaint,    still weak,   some with not wanting to wear her valve on trach


cont other,   may be able to work with speech tomorrow,    ketty OK to 

try, 








5/23,  anxiety is up today,   she dislikes the valve still,    


shower and outside today


.   





5/22 she doesnt want to wear her passy-kristan valve,  discussed str and plan with 

her.


speech following,  needs swallow study,   but needs to wear her valve longer,  


cont current


able to walk some, walker 





5/21  stronger,   better,   


we discussed better oral care


she would like to try swallow study,  wants to try to eat,    speech is 

following








5/20/2020 


She remains in the ICU


sitting up and working with OT,   getting better


if limit pain meds, may do better off the vent, 





Nurses trying to suction her,  that is also improved, 


Chart reviewed


 








5/19/2020


Patient seen and examined in the ICU


She had an episode yesterday of tachycardia and severe agitation we gave her 

some Ativan


After that she seemed to have stroke symptoms but now that the Ativan has wore 

off her stroke symptoms have resolved


 


 


She is on IV meropenem and daptomycin and micafungin


Chart reviewed


Discussed with RN


Patient is still critically ill


 


BRIEF OPERATIVE NOTE


Pre-Op Diagnosis


Pancreatitis with pseudocysts, suspected infection


Post-Op Diagnosis


same


Procedure Performed


CT abdominal Drains x 3


Surgeon


Hardy


Anesthesia Type:  Conscious Sedation


Findings


3 abdominal drains, 14F, with turbid pancreatic fluid and necrotic debris in 

each.


Complications


No immediate








5/9: Patient today somewhat restless and having bilious secretions from ET tube,

imaging studies ordered, discussed with consultant. Pretty poor prognosis, 

hopefully is not a fistula, poor surgical candidate. 





5/10: Imaging with no acute events, she seems more stable today compared to 

yesterday. Encouraged as much activity as possible patient at high risk for 

severe depression.





Vitals


Vitals





Vital Signs








  Date Time  Temp Pulse Resp B/P (MAP) Pulse Ox O2 Delivery O2 Flow Rate FiO2


 


5/24/20 11:16   22  99 Tracheal Collar 8.0 


 


5/24/20 08:00  92  96/54 (68)    


 


5/24/20 04:00 97.5       





 97.5       











Physical Exam


Physical Exam


GENERAL: Propped up in bed, resting quietly 


HEENT:  Oral cavity clear,  NGT


NECK:  Trach shield


LUNGS: Clear anteriorly, no accessory muscle use 


HEART:  S1, S2, regular 


ABDOMEN: mod distention, hypoactive BS, tender, + drains x 3


: [place] (4/14)


EXTREMITIES: Generalized edema, improving, no cyanosis, SCDs bilaterally 


SKIN: Warm and dry.  No generalized rash.  


CNS: Very weak 


RUE-PICC (4/30) clean


General:  Alert, Cooperative, No acute distress


Heart:  Regular rate, Normal S1, Normal S2, No murmurs, Gallops


Lungs:  Wheezing, Other (decrease bs)


Abdomen:  Soft, No tenderness


Extremities:  No clubbing, No cyanosis, No edema, Normal pulses, No 

tenderness/swelling


Skin:  Other (warm, dry)





Labs


LABS





Laboratory Tests








Test


 5/23/20


17:53 5/23/20


23:53 5/24/20


05:46


 


Glucose (Fingerstick)


 140 mg/dL


(70-99) 135 mg/dL


(70-99) 150 mg/dL


(70-99)











Assessment and Plan


Assessmemt and Plan


Problems


Medical Problems:


(1) Acute pancreatitis


Status: Acute  





(2) Cholelithiasis


Status: Acute  











Comment


Review of Relevant


I have reviewed the following items josy (where applicable) has been applied.


Labs





Laboratory Tests








Test


 5/22/20


18:00 5/23/20


00:29 5/23/20


06:06 5/23/20


17:53


 


Glucose (Fingerstick)


 155 mg/dL


(70-99) 144 mg/dL


(70-99) 163 mg/dL


(70-99) 140 mg/dL


(70-99)


 


Test


 5/23/20


23:53 5/24/20


05:46 


 





 


Glucose (Fingerstick)


 135 mg/dL


(70-99) 150 mg/dL


(70-99) 


 











Laboratory Tests








Test


 5/23/20


17:53 5/23/20


23:53 5/24/20


05:46


 


Glucose (Fingerstick)


 140 mg/dL


(70-99) 135 mg/dL


(70-99) 150 mg/dL


(70-99)








Microbiology


5/17/20 Blood Culture - Final, Complete


          NO GROWTH AFTER 5 DAYS


5/6/20 Fungal Culture - Final, Complete


         


5/6/20 Fungal Culture Result 1 - Final, Complete


         


4/30/20 Aerobic Culture - Final, Complete


          


4/30/20 Aerobic Culture Result 1 (ANOSN) - Final, Complete


          


4/30/20 Gram Stain - Final, Complete


          


4/30/20 Gram Stain Result 1 (ANSON) - Final, Complete


          


4/30/20 Gram Stain Result 2 (ANSON) - Final, Complete


          


4/12/20 Urine Culture - Final, Complete


          


4/12/20 Urine Culture Result 1 (ANSON) - Final, Complete


Medications





Current Medications


Sodium Chloride 1,000 ml @  1,000 mls/hr Q1H IV  Last administered on 3/16/20at 

03:00;  Start 3/16/20 at 03:00;  Stop 3/16/20 at 03:59;  Status DC


Ondansetron HCl (Zofran) 4 mg 1X  ONCE IVP  Last administered on 3/16/20at 

03:27;  Start 3/16/20 at 03:00;  Stop 3/16/20 at 03:01;  Status DC


Morphine Sulfate (Morphine Sulfate) 4 mg 1X  ONCE IV ;  Start 3/16/20 at 03:00; 

Stop 3/16/20 at 03:01;  Status Cancel


Ketorolac Tromethamine (Toradol 30mg Vial) 30 mg 1X  ONCE IV  Last administered 

on 3/16/20at 02:54;  Start 3/16/20 at 03:00;  Stop 3/16/20 at 03:01;  Status DC


Fentanyl Citrate (Fentanyl 2ml Vial) 25 mcg 1X  ONCE IVP  Last administered on 

3/16/20at 03:23;  Start 3/16/20 at 03:30;  Stop 3/16/20 at 03:31;  Status DC


Fentanyl Citrate (Fentanyl 2ml Vial) 100 mcg STK-MED ONCE .ROUTE ;  Start 

3/16/20 at 03:18;  Stop 3/16/20 at 03:18;  Status DC


Iohexol (Omnipaque 350 Mg/ml) 90 ml 1X  ONCE IV  Last administered on 3/16/20at 

03:25;  Start 3/16/20 at 03:30;  Stop 3/16/20 at 03:31;  Status DC


Info (CONTRAST GIVEN -- Rx MONITORING) 1 each PRN DAILY  PRN MC SEE COMMENTS;  

Start 3/16/20 at 03:30;  Stop 3/18/20 at 03:29;  Status DC


Hydromorphone HCl (Dilaudid) 0.5 mg 1X  ONCE IV  Last administered on 3/16/20at 

03:55;  Start 3/16/20 at 04:30;  Stop 3/16/20 at 04:32;  Status DC


Ondansetron HCl (Zofran) 4 mg PRN Q8HRS  PRN IV NAUSEA/VOMITING 1ST CHOICE;  

Start 3/16/20 at 05:00;  Stop 3/16/20 at 09:27;  Status DC


Morphine Sulfate (Morphine Sulfate) 2 mg PRN Q2HR  PRN IV SEVERE PAIN 7-10 Last 

administered on 3/17/20at 12:26;  Start 3/16/20 at 05:00;  Stop 3/17/20 at 

14:15;  Status DC


Sodium Chloride 1,000 ml @  125 mls/hr Q8H IV  Last administered on 3/16/20at 

20:56;  Start 3/16/20 at 05:00;  Stop 3/17/20 at 04:59;  Status DC


Hydromorphone HCl (Dilaudid) 0.5 mg PRN Q3HRS  PRN IV SEVERE PAIN 7-10 Last 

administered on 3/17/20at 10:06;  Start 3/16/20 at 05:00;  Stop 3/17/20 at 

12:01;  Status DC


Piperacillin Sod/ Tazobactam Sod 4.5 gm/Sodium Chloride 100 ml @  200 mls/hr 1X 

ONCE IV  Last administered on 3/16/20at 05:44;  Start 3/16/20 at 06:00;  Stop 

3/16/20 at 06:29;  Status DC


Ondansetron HCl (Zofran) 4 mg PRN Q4HRS  PRN IV NAUSEA/VOMITING 1ST CHOICE Last 

administered on 5/24/20at 04:36;  Start 3/16/20 at 09:30


Insulin Human Lispro (HumaLOG) 0-9 UNITS Q6HRS SQ  Last administered on 

5/23/20at 06:08;  Start 3/16/20 at 09:30


Dextrose (Dextrose 50%-Water Syringe) 12.5 gm PRN Q15MIN  PRN IV SEE COMMENTS;  

Start 3/16/20 at 09:30


Pantoprazole Sodium (PROTONIX VIAL for IV PUSH) 40 mg DAILYAC IVP  Last 

administered on 5/24/20at 11:15;  Start 3/16/20 at 11:30


Prochlorperazine Edisylate (Compazine) 10 mg PRN Q6HRS  PRN IV NAUSEA/VOMITING, 

2nd CHOICE Last administered on 5/14/20at 06:11;  Start 3/16/20 at 17:45


Atenolol (Tenormin) 100 mg DAILY PO ;  Start 3/17/20 at 09:00;  Stop 3/16/20 at 

20:08;  Status DC


Metoprolol Tartrate (Lopressor Vial) 2.5 mg Q6HRS IVP  Last administered on 

3/17/20at 05:51;  Start 3/16/20 at 20:15;  Stop 3/17/20 at 10:02;  Status DC


Metoprolol Tartrate (Lopressor Vial) 5 mg Q6HRS IVP  Last administered on 

3/26/20at 00:12;  Start 3/17/20 at 10:15;  Stop 3/28/20 at 08:48;  Status DC


Hydromorphone HCl (Dilaudid) 1 mg PRN Q3HRS  PRN IV SEVERE PAIN 7-10 Last 

administered on 3/23/20at 05:13;  Start 3/17/20 at 12:00;  Stop 3/31/20 at 

00:25;  Status DC


Lidocaine HCl (Buffered Lidocaine 1%) 3 ml STK-MED ONCE .ROUTE ;  Start 3/17/20 

at 12:55;  Stop 3/17/20 at 12:56;  Status DC


Albumin Human 500 ml @  125 mls/hr 1X  ONCE IV  Last administered on 3/17/20at 

14:33;  Start 3/17/20 at 14:30;  Stop 3/17/20 at 18:32;  Status DC


Norepinephrine Bitartrate 8 mg/ Dextrose 258 ml @  17.299 mls/ hr CONT  PRN IV 

PER PROTOCOL Last administered on 4/14/20at 12:48;  Start 3/17/20 at 15:30;  

Stop 4/17/20 at 09:19;  Status DC


Sodium Chloride 1,000 ml @  125 mls/hr Q8H IV  Last administered on 3/17/20at 

21:04;  Start 3/17/20 at 16:00;  Stop 3/18/20 at 02:42;  Status DC


Albumin Human 500 ml @  125 mls/hr PRN BID  PRN IV After every 2L NSS & BP < 

90mm Last administered on 4/1/20at 14:21;  Start 3/17/20 at 16:00


Iohexol (Omnipaque 300 Mg/ml) 60 ml 1X  ONCE IV  Last administered on 3/17/20at 

17:20;  Start 3/17/20 at 17:00;  Stop 3/17/20 at 17:01;  Status DC


Info (CONTRAST GIVEN -- Rx MONITORING) 1 each PRN DAILY  PRN MC SEE COMMENTS;  

Start 3/17/20 at 17:00;  Stop 3/19/20 at 16:59;  Status DC


Meropenem 1 gm/ Sodium Chloride 100 ml @  200 mls/hr Q8HRS IV  Last administered

on 3/18/20at 05:45;  Start 3/17/20 at 20:00;  Stop 3/18/20 at 08:48;  Status DC


Furosemide (Lasix) 40 mg 1X  ONCE IVP  Last administered on 3/17/20at 22:12;  

Start 3/17/20 at 22:30;  Stop 3/17/20 at 22:31;  Status DC


Calcium Chloride 1000 mg/Sodium Chloride 110 ml @  220 mls/hr 1X  ONCE IV  Last 

administered on 3/17/20at 22:11;  Start 3/17/20 at 22:30;  Stop 3/17/20 at 

22:59;  Status DC


Albuterol Sulfate (Ventolin Neb Soln) 2.5 mg 1X  ONCE NEB  Last administered on 

3/18/20at 00:56;  Start 3/17/20 at 22:30;  Stop 3/17/20 at 22:31;  Status DC


Insulin Human Regular (HumuLIN R VIAL) 5 unit 1X  ONCE IV  Last administered on 

3/17/20at 22:14;  Start 3/17/20 at 22:30;  Stop 3/17/20 at 22:31;  Status DC


Magnesium Sulfate 50 ml @ 25 mls/hr 1X  ONCE IV  Last administered on 3/18/20at 

02:57;  Start 3/18/20 at 03:00;  Stop 3/18/20 at 04:59;  Status DC


Calcium Gluconate 1000 mg/Sodium Chloride 110 ml @  220 mls/hr 1X  ONCE IV  Last

administered on 3/18/20at 02:46;  Start 3/18/20 at 03:00;  Stop 3/18/20 at 

03:29;  Status DC


Sodium Chloride 1,000 ml @  200 mls/hr Q5H IV  Last administered on 3/18/20at 

02:46;  Start 3/18/20 at 03:00;  Stop 3/18/20 at 10:21;  Status DC


Calcium Gluconate 1000 mg/Sodium Chloride 110 ml @  220 mls/hr 1X  ONCE IV  Last

administered on 3/18/20at 03:21;  Start 3/18/20 at 03:30;  Stop 3/18/20 at 

03:59;  Status DC


Sodium Bicarbonate 50 meq/Sodium Chloride 1,050 ml @  75 mls/hr Q14H IV  Last 

administered on 3/22/20at 21:10;  Start 3/18/20 at 07:30;  Stop 3/23/20 at 

10:28;  Status DC


Calcium Gluconate 2000 mg/Sodium Chloride 120 ml @  220 mls/hr 1X  ONCE IV  Last

administered on 3/18/20at 09:05;  Start 3/18/20 at 07:30;  Stop 3/18/20 at 08:02

;  Status DC


Lidocaine HCl (Xylocaine-Mpf 1% 2ml Vial) 2 ml STK-MED ONCE .ROUTE ;  Start 

3/18/20 at 08:47;  Stop 3/18/20 at 08:47;  Status DC


Meropenem 500 mg/ Sodium Chloride 50 ml @  100 mls/hr Q12HR IV  Last 

administered on 3/23/20at 21:01;  Start 3/18/20 at 18:00;  Stop 3/24/20 at 

07:58;  Status DC


Lidocaine HCl (Buffered Lidocaine 1%) 3 ml STK-MED ONCE .ROUTE ;  Start 3/18/20 

at 09:46;  Stop 3/18/20 at 09:46;  Status DC


Lidocaine HCl (Buffered Lidocaine 1%) 6 ml 1X  ONCE INJ  Last administered on 

3/18/20at 10:26;  Start 3/18/20 at 10:15;  Stop 3/18/20 at 10:16;  Status DC


Info (Tpn Per Pharmacy) 1 each PRN DAILY  PRN MC SEE COMMENTS Last administered 

on 5/24/20at 08:47;  Start 3/18/20 at 12:00


Sodium Chloride 1,000 ml @  1,000 mls/hr Q1H PRN IV hypotension;  Start 3/18/20 

at 12:07;  Stop 3/18/20 at 18:06;  Status DC


Diphenhydramine HCl (Benadryl) 25 mg 1X PRN  PRN IV ITCHING;  Start 3/18/20 at 

12:15;  Stop 3/19/20 at 12:14;  Status DC


Diphenhydramine HCl (Benadryl) 25 mg 1X PRN  PRN IV ITCHING;  Start 3/18/20 at 

12:15;  Stop 3/19/20 at 12:14;  Status DC


Sodium Chloride 1,000 ml @  400 mls/hr Q2H30M PRN IV PATENCY;  Start 3/18/20 at 

12:07;  Stop 3/19/20 at 00:06;  Status DC


Info (PHARMACY MONITORING -- do not chart) 1 each PRN DAILY  PRN MC SEE 

COMMENTS;  Start 3/18/20 at 12:15;  Stop 3/20/20 at 08:13;  Status DC


Sodium Chloride 90 meq/Calcium Gluconate 10 meq/ Multivitamins 10 ml/Chromium/ 

Copper/Manganese/ Seleni/Zn 1 ml/ Total Parenteral Nutrition/Amino 

Acids/Dextrose/ Fat Emulsion Intravenous 55.005 ml  @ 2.292 mls/hr TPN  CONT IV 

;  Start 3/18/20 at 22:00;  Stop 3/18/20 at 12:33;  Status DC


Info (Tpn Per Pharmacy) 1 each PRN DAILY  PRN MC SEE COMMENTS;  Start 3/18/20 at

12:30;  Status UNV


Sodium Chloride 90 meq/Calcium Gluconate 10 meq/ Multivitamins 10 ml/Chromium/ 

Copper/Manganese/ Seleni/Zn 0.5 ml/ Total Parenteral Nutrition/Amino 

Acids/Dextrose/ Fat Emulsion Intravenous 1,512 ml @  63 mls/hr TPN  CONT IV  

Last administered on 3/18/20at 22:06;  Start 3/18/20 at 22:00;  Stop 3/19/20 at 

21:59;  Status DC


Calcium Carbonate/ Glycine (Tums) 500 mg PRN AFTMEALHC  PRN PO INDIGESTION;  

Start 3/18/20 at 17:45;  Stop 5/13/20 at 10:25;  Status DC


Calcium Gluconate (Calcium Gluconate) 2,000 mg 1X  ONCE IVP  Last administered 

on 3/19/20at 02:19;  Start 3/19/20 at 02:15;  Stop 3/19/20 at 02:16;  Status DC


Calcium Chloride 3000 mg/Sodium Chloride 1,030 ml @  50 mls/hr [id number] IV  Last 

administered on 3/21/20at 02:17;  Start 3/19/20 at 08:00;  Stop 3/21/20 at 

15:23;  Status DC


Lorazepam (Ativan Inj) 1 mg PRN Q4HRS  PRN IVP ANXIETY / AGITATION, 2nd choic 

Last administered on 4/17/20at 03:51;  Start 3/19/20 at 09:00;  Stop 4/17/20 at 

09:19;  Status DC


Sodium Chloride 1,000 ml @  1,000 mls/hr Q1H PRN IV hypotension;  Start 3/19/20 

at 08:56;  Stop 3/19/20 at 14:55;  Status DC


Albumin Human 200 ml @  200 mls/hr 1X PRN  PRN IV Hypotension;  Start 3/19/20 at

09:00;  Stop 3/19/20 at 14:59;  Status DC


Diphenhydramine HCl (Benadryl) 25 mg 1X PRN  PRN IV ITCHING;  Start 3/19/20 at 

09:00;  Stop 3/20/20 at 08:59;  Status DC


Diphenhydramine HCl (Benadryl) 25 mg 1X PRN  PRN IV ITCHING;  Start 3/19/20 at 

09:00;  Stop 3/20/20 at 08:59;  Status DC


Sodium Chloride 1,000 ml @  400 mls/hr Q2H30M PRN IV PATENCY;  Start 3/19/20 at 

08:56;  Stop 3/19/20 at 20:55;  Status DC


Info (PHARMACY MONITORING -- do not chart) 1 each PRN DAILY  PRN MC SEE 

COMMENTS;  Start 3/19/20 at 09:00;  Status UNV


Info (PHARMACY MONITORING -- do not chart) 1 each PRN DAILY  PRN MC SEE 

COMMENTS;  Start 3/19/20 at 09:00;  Stop 3/20/20 at 08:13;  Status DC


Digoxin (Lanoxin) 500 mcg 1X  ONCE IV  Last administered on 3/19/20at 10:04;  

Start 3/19/20 at 10:00;  Stop 3/19/20 at 10:01;  Status DC


Digoxin (Lanoxin) 125 mcg 1X  ONCE IV  Last administered on 3/19/20at 17:10;  

Start 3/19/20 at 18:00;  Stop 3/19/20 at 18:01;  Status DC


Magnesium Sulfate 100 ml @  25 mls/hr 1X  ONCE IV  Last administered on 

3/19/20at 12:48;  Start 3/19/20 at 13:00;  Stop 3/19/20 at 16:59;  Status DC


Sodium Chloride 90 meq/Magnesium Sulfate 10 meq/ Calcium Gluconate 20 meq/ 

Multivitamins 10 ml/Chromium/ Copper/Manganese/ Seleni/Zn 0.5 ml/ Total 

Parenteral Nutrition/Amino Acids/Dextrose/ Fat Emulsion Intravenous 1,512 ml @  

63 mls/hr TPN  CONT IV  Last administered on 3/19/20at 22:25;  Start 3/19/20 at 

22:00;  Stop 3/20/20 at 21:59;  Status DC


Sodium Chloride 1,000 ml @  1,000 mls/hr Q1H PRN IV hypotension;  Start 3/20/20 

at 08:05;  Stop 3/20/20 at 14:04;  Status DC


Albumin Human 200 ml @  200 mls/hr 1X  ONCE IV  Last administered on 3/20/20at 

08:57;  Start 3/20/20 at 08:15;  Stop 3/20/20 at 09:14;  Status DC


Diphenhydramine HCl (Benadryl) 25 mg 1X PRN  PRN IV ITCHING;  Start 3/20/20 at 

08:15;  Stop 3/21/20 at 08:14;  Status DC


Diphenhydramine HCl (Benadryl) 25 mg 1X PRN  PRN IV ITCHING;  Start 3/20/20 at 

08:15;  Stop 3/21/20 at 08:14;  Status DC


Sodium Chloride 1,000 ml @  400 mls/hr Q2H30M PRN IV PATENCY;  Start 3/20/20 at 

08:05;  Stop 3/20/20 at 20:04;  Status DC


Info (PHARMACY MONITORING -- do not chart) 1 each PRN DAILY  PRN MC SEE 

COMMENTS;  Start 3/20/20 at 08:15;  Stop 3/24/20 at 07:57;  Status DC


Sodium Chloride 90 meq/Potassium Chloride 15 meq/ Potassium Phosphate 10 mmol/ 

Magnesium Sulfate 10 meq/Calcium Gluconate 20 meq/ Multivitamins 10 ml/Chromium/

Copper/Manganese/ Seleni/Zn 0.5 ml/ Total Parenteral Nutrition/Amino 

Acids/Dextrose/ Fat Emulsion Intravenous 1,512 ml @  63 mls/hr TPN  CONT IV  

Last administered on 3/20/20at 21:01;  Start 3/20/20 at 22:00;  Stop 3/21/20 at 

21:59;  Status DC


Potassium Chloride/Water 100 ml @  100 mls/hr 1X  ONCE IV  Last administered on 

3/20/20at 14:09;  Start 3/20/20 at 14:00;  Stop 3/20/20 at 14:59;  Status DC


Benzocaine (Hurricaine One) 1 spray 1X  ONCE MM  Last administered on 3/20/20at 

16:38;  Start 3/20/20 at 14:30;  Stop 3/20/20 at 14:31;  Status DC


Lidocaine HCl (Glydo (Lidocaine) Jelly) 1 thomas 1X  ONCE MM  Last administered on 

3/20/20at 16:38;  Start 3/20/20 at 14:30;  Stop 3/20/20 at 14:31;  Status DC


Linezolid/Dextrose 300 ml @  300 mls/hr Q12HR IV  Last administered on 3/26/20at

21:04;  Start 3/20/20 at 20:00;  Stop 3/27/20 at 07:50;  Status DC


Acetaminophen (Tylenol) 650 mg PRN Q6HRS  PRN PO MILD PAIN / TEMP;  Start 

3/21/20 at 03:30;  Stop 3/21/20 at 03:36;  Status DC


Acetaminophen (Tylenol) 650 mg PRN Q6HRS  PRN PEG MILD PAIN / TEMP Last 

administered on 4/16/20at 19:56;  Start 3/21/20 at 03:36;  Stop 5/13/20 at 

10:25;  Status DC


Sodium Chloride 1,000 ml @  1,000 mls/hr Q1H PRN IV hypotension;  Start 3/21/20 

at 07:50;  Stop 3/21/20 at 13:49;  Status DC


Albumin Human 200 ml @  200 mls/hr 1X PRN  PRN IV Hypotension;  Start 3/21/20 at

08:00;  Stop 3/21/20 at 13:59;  Status DC


Sodium Chloride (Normal Saline Flush) 10 ml 1X PRN  PRN IV AP catheter pack;  

Start 3/21/20 at 08:00;  Stop 3/22/20 at 07:59;  Status DC


Sodium Chloride (Normal Saline Flush) 10 ml 1X PRN  PRN IV  catheter pack;  

Start 3/21/20 at 08:00;  Stop 3/22/20 at 07:59;  Status DC


Sodium Chloride 1,000 ml @  400 mls/hr Q2H30M PRN IV PATENCY;  Start 3/21/20 at 

07:50;  Stop 3/21/20 at 19:49;  Status DC


Info (PHARMACY MONITORING -- do not chart) 1 each PRN DAILY  PRN MC SEE 

COMMENTS;  Start 3/21/20 at 08:00;  Status UNV


Info (PHARMACY MONITORING -- do not chart) 1 each PRN DAILY  PRN MC SEE 

COMMENTS;  Start 3/21/20 at 08:00;  Stop 3/23/20 at 08:25;  Status DC


Sodium Chloride 90 meq/Potassium Chloride 15 meq/ Potassium Phosphate 10 mmol/ 

Magnesium Sulfate 10 meq/Calcium Gluconate 20 meq/ Multivitamins 10 ml/Chromium/

Copper/Manganese/ Seleni/Zn 0.5 ml/ Total Parenteral Nutrition/Amino 

Acids/Dextrose/ Fat Emulsion Intravenous 1,512 ml @  63 mls/hr TPN  CONT IV  

Last administered on 3/21/20at 20:57;  Start 3/21/20 at 22:00;  Stop 3/22/20 at 

21:59;  Status DC


Sodium Chloride 90 meq/Potassium Chloride 15 meq/ Potassium Phosphate 15 mmol/ 

Magnesium Sulfate 10 meq/Calcium Gluconate 20 meq/ Multivitamins 10 ml/Chromium/

Copper/Manganese/ Seleni/Zn 0.5 ml/ Total Parenteral Nutrition/Amino 

Acids/Dextrose/ Fat Emulsion Intravenous 1,512 ml @  63 mls/hr TPN  CONT IV ;  

Start 3/22/20 at 22:00;  Stop 3/22/20 at 14:16;  Status DC


Sodium Chloride 90 meq/Potassium Chloride 15 meq/ Potassium Phosphate 15 mmol/ 

Magnesium Sulfate 10 meq/Calcium Gluconate 20 meq/ Multivitamins 10 ml/Chromium/

Copper/Manganese/ Seleni/Zn 0.5 ml/ Total Parenteral Nutrition/Amino 

Acids/Dextrose/ Fat Emulsion Intravenous 1,200 ml @  50 mls/hr TPN  CONT IV ;  

Start 3/22/20 at 22:00;  Stop 3/22/20 at 14:17;  Status DC


Sodium Chloride 90 meq/Potassium Chloride 15 meq/ Potassium Phosphate 10 mmol/ 

Magnesium Sulfate 10 meq/Calcium Gluconate 20 meq/ Multivitamins 10 ml/Chromium/

Copper/Manganese/ Seleni/Zn 0.5 ml/ Total Parenteral Nutrition/Amino Acids/Dextr

ose/ Fat Emulsion Intravenous 1,200 ml @  50 mls/hr TPN  CONT IV  Last 

administered on 3/22/20at 23:29;  Start 3/22/20 at 22:00;  Stop 3/23/20 at 

21:59;  Status DC


Sodium Chloride 1,000 ml @  1,000 mls/hr Q1H PRN IV hypotension;  Start 3/23/20 

at 07:28;  Stop 3/23/20 at 13:27;  Status DC


Albumin Human 200 ml @  200 mls/hr 1X  ONCE IV  Last administered on 3/23/20at 

08:51;  Start 3/23/20 at 07:30;  Stop 3/23/20 at 08:29;  Status DC


Diphenhydramine HCl (Benadryl) 25 mg 1X PRN  PRN IV ITCHING;  Start 3/23/20 at 

07:30;  Stop 3/24/20 at 07:29;  Status DC


Diphenhydramine HCl (Benadryl) 25 mg 1X PRN  PRN IV ITCHING;  Start 3/23/20 at 

07:30;  Stop 3/24/20 at 07:29;  Status DC


Sodium Chloride 1,000 ml @  400 mls/hr Q2H30M PRN IV PATENCY;  Start 3/23/20 at 

07:28;  Stop 3/23/20 at 19:27;  Status DC


Info (PHARMACY MONITORING -- do not chart) 1 each PRN DAILY  PRN MC SEE 

COMMENTS;  Start 3/23/20 at 07:30;  Stop 4/3/20 at 13:01;  Status DC


Metronidazole 100 ml @  100 mls/hr Q6HRS IV  Last administered on 4/8/20at 

06:26;  Start 3/23/20 at 08:30;  Stop 4/8/20 at 09:58;  Status DC


Micafungin Sodium 100 mg/Dextrose 100 ml @  100 mls/hr Q24H IV  Last ad

ministered on 4/30/20at 08:18;  Start 3/23/20 at 09:00;  Stop 4/30/20 at 20:58; 

Status DC


Propofol 0 ml @ As Directed STK-MED ONCE IV ;  Start 3/23/20 at 07:53;  Stop 

3/23/20 at 07:53;  Status DC


Etomidate (Amidate) 20 mg STK-MED ONCE IV ;  Start 3/23/20 at 07:53;  Stop 

3/23/20 at 07:54;  Status DC


Midazolam HCl (Versed) 5 mg STK-MED ONCE .ROUTE ;  Start 3/23/20 at 07:57;  Stop

3/23/20 at 07:57;  Status DC


Fentanyl Citrate 30 ml @ 0 mls/hr CONT  PRN IV SEE PROTOCOL Last administered on

4/17/20at 06:12;  Start 3/23/20 at 08:15;  Stop 4/17/20 at 09:19;  Status DC


Artificial Tears (Artificial Tears) 1 drop PRN Q1HR  PRN OU DRY EYE, 1st choice;

 Start 3/23/20 at 08:15;  Stop 4/29/20 at 05:31;  Status DC


Midazolam HCl 50 mg/Sodium Chloride 50 ml @ 0 mls/hr CONT  PRN IV SEE PROTOCOL 

Last administered on 3/26/20at 22:39;  Start 3/23/20 at 08:15;  Stop 3/28/20 at 

15:59;  Status DC


Etomidate (Amidate) 8 mg 1X  ONCE IV  Last administered on 3/23/20at 08:33;  

Start 3/23/20 at 08:30;  Stop 3/23/20 at 08:31;  Status DC


Succinylcholine Chloride (Anectine) 120 mg 1X  ONCE IV  Last administered on 

3/23/20at 08:34;  Start 3/23/20 at 08:30;  Stop 3/23/20 at 08:31;  Status DC


Midazolam HCl (Versed) 5 mg 1X  ONCE IV ;  Start 3/23/20 at 08:30;  Stop 3/23/20

at 08:31;  Status DC


Potassium Chloride 15 meq/ Bicarbonate Dialysis Soln w/ out KCl 5,007.5 ml  @ 

1,000 mls/ hr Q5H1M IV  Last administered on 3/24/20at 11:11;  Start 3/23/20 at 

12:00;  Stop 3/24/20 at 11:15;  Status DC


Potassium Chloride 15 meq/ Bicarbonate Dialysis Soln w/ out KCl 5,007.5 ml  @ 

1,000 mls/ hr Q5H1M IV  Last administered on 3/24/20at 11:12;  Start 3/23/20 at 

12:00;  Stop 3/24/20 at 11:17;  Status DC


Potassium Chloride 15 meq/ Bicarbonate Dialysis Soln w/ out KCl 5,007.5 ml  @ 

1,000 mls/ hr Q5H1M IV  Last administered on 3/24/20at 11:11;  Start 3/23/20 at 

12:00;  Stop 3/24/20 at 11:19;  Status DC


Sodium Chloride 90 meq/Potassium Chloride 15 meq/ Potassium Phosphate 10 mmol/ 

Magnesium Sulfate 10 meq/Calcium Gluconate 20 meq/ Multivitamins 10 ml/Chromium/

Copper/Manganese/ Seleni/Zn 0.5 ml/ Total Parenteral Nutrition/Amino 

Acids/Dextrose/ Fat Emulsion Intravenous 1,400 ml @  58.333 mls/ hr TPN  CONT IV

 Last administered on 3/23/20at 21:42;  Start 3/23/20 at 22:00;  Stop 3/24/20 at

21:59;  Status DC


Heparin Sodium (Porcine) (Heparin Sodium) 5,000 unit Q8HRS SQ  Last administered

on 3/28/20at 05:55;  Start 3/23/20 at 15:00;  Stop 3/28/20 at 13:28;  Status DC


Meropenem 500 mg/ Sodium Chloride 50 ml @  100 mls/hr Q6HRS IV  Last 

administered on 3/25/20at 06:00;  Start 3/24/20 at 09:00;  Stop 3/25/20 at 

07:29;  Status DC


Potassium Phosphate 20 mmol/ Sodium Chloride 106.6667 ml @  51.667 m... 1X  ONCE

IV  Last administered on 3/24/20at 11:22;  Start 3/24/20 at 10:15;  Stop 3/24/20

at 12:18;  Status DC


Acetaminophen (Tylenol Supp) 650 mg PRN Q6HRS  PRN UT MILD PAIN / TEMP > 100.3'F

Last administered on 5/5/20at 09:12;  Start 3/24/20 at 10:30


Potassium Chloride/Water 100 ml @  100 mls/hr Q1H IV  Last administered on 

3/24/20at 12:12;  Start 3/24/20 at 11:00;  Stop 3/24/20 at 12:59;  Status DC


Potassium Chloride 20 meq/ Bicarbonate Dialysis Soln w/ out KCl 5,010 ml @  

1,000 mls/hr Q5H1M IV  Last administered on 3/25/20at 08:48;  Start 3/24/20 at 

12:00;  Stop 3/25/20 at 13:03;  Status DC


Potassium Chloride 20 meq/ Bicarbonate Dialysis Soln w/ out KCl 5,010 ml @  

1,000 mls/hr Q5H1M IV  Last administered on 3/29/20at 14:52;  Start 3/24/20 at 

11:30;  Stop 3/29/20 at 19:59;  Status DC


Potassium Chloride 20 meq/ Bicarbonate Dialysis Soln w/ out KCl 5,010 ml @  

1,000 mls/hr Q5H1M IV  Last administered on 3/29/20at 14:53;  Start 3/24/20 at 

11:30;  Stop 3/29/20 at 19:59;  Status DC


Sodium Chloride 90 meq/Potassium Chloride 15 meq/ Potassium Phosphate 15 mmol/ 

Magnesium Sulfate 10 meq/Calcium Gluconate 15 meq/ Multivitamins 10 ml/Chromium/

Copper/Manganese/ Seleni/Zn 0.5 ml/ Total Parenteral Nutrition/Amino 

Acids/Dextrose/ Fat Emulsion Intravenous 1,400 ml @  58.333 mls/ hr TPN  CONT IV

 Last administered on 3/24/20at 22:17;  Start 3/24/20 at 22:00;  Stop 3/25/20 at

21:59;  Status DC


Cefepime HCl (Maxipime) 2 gm Q12HR IVP  Last administered on 4/7/20at 20:56;  

Start 3/25/20 at 09:00;  Stop 4/8/20 at 09:58;  Status DC


Daptomycin 500 mg/ Sodium Chloride 50 ml @  100 mls/hr Q48H IV  Last 

administered on 4/10/20at 09:57;  Start 3/25/20 at 08:30;  Stop 4/10/20 at 

10:07;  Status DC


Lidocaine HCl (Buffered Lidocaine 1%) 3 ml 1X  ONCE INJ  Last administered on 

3/25/20at 10:27;  Start 3/25/20 at 10:30;  Stop 3/25/20 at 10:31;  Status DC


Potassium Phosphate 20 mmol/ Sodium Chloride 106.6667 ml @  51.667 m... 1X  ONCE

IV  Last administered on 3/25/20at 12:51;  Start 3/25/20 at 13:00;  Stop 3/25/20

at 15:03;  Status DC


Sodium Chloride 90 meq/Potassium Chloride 15 meq/ Potassium Phosphate 18 mmol/ 

Magnesium Sulfate 8 meq/Calcium Gluconate 15 meq/ Multivitamins 10 ml/Chromium/ 

Copper/Manganese/ Seleni/Zn 0.5 ml/ Total Parenteral Nutrition/Amino 

Acids/Dextrose/ Fat Emulsion Intravenous 1,400 ml @  58.333 mls/ hr TPN  CONT IV

 Last administered on 3/25/20at 22:16;  Start 3/25/20 at 22:00;  Stop 3/26/20 at

21:59;  Status DC


Potassium Chloride 20 meq/ Bicarbonate Dialysis Soln w/ out KCl 5,010 ml @  

1,000 mls/hr Q5H1M IV  Last administered on 3/29/20at 14:54;  Start 3/25/20 at 

16:00;  Stop 3/29/20 at 19:59;  Status DC


Multi-Ingred Cream/Lotion/Oil/ Oint (Artificial Tears Eye Ointment) 1 thomas PRN 

Q1HR  PRN OU DRY EYE, 2nd choice Last administered on 4/13/20at 08:19;  Start 

3/25/20 at 17:30


Sodium Chloride 90 meq/Potassium Chloride 15 meq/ Potassium Phosphate 18 mmol/ 

Magnesium Sulfate 8 meq/Calcium Gluconate 15 meq/ Multivitamins 10 ml/Chromium/ 

Copper/Manganese/ Seleni/Zn 0.5 ml/ Total Parenteral Nutrition/Amino 

Acids/Dextrose/ Fat Emulsion Intravenous 1,400 ml @  58.333 mls/ hr TPN  CONT IV

 Last administered on 3/26/20at 22:00;  Start 3/26/20 at 22:00;  Stop 3/27/20 at

21:59;  Status DC


Albumin Human 500 ml @  125 mls/hr 1X  ONCE IV ;  Start 3/26/20 at 14:15;  Stop 

3/26/20 at 18:14;  Status DC


Sodium Chloride 90 meq/Potassium Chloride 15 meq/ Potassium Phosphate 18 mmol/ 

Magnesium Sulfate 8 meq/Calcium Gluconate 15 meq/ Multivitamins 10 ml/Chromium/ 

Copper/Manganese/ Seleni/Zn 0.5 ml/ Insulin Human Regular 10 unit/ Total 

Parenteral Nutrition/Amino Acids/Dextrose/ Fat Emulsion Intravenous 1,400 ml @  

58.333 mls/ hr TPN  CONT IV  Last administered on 3/27/20at 21:43;  Start 

3/27/20 at 22:00;  Stop 3/28/20 at 21:59;  Status DC


Lidocaine HCl (Buffered Lidocaine 1%) 3 ml STK-MED ONCE .ROUTE ;  Start 3/25/20 

at 10:00;  Stop 3/27/20 at 13:57;  Status DC


Midazolam HCl 100 mg/Sodium Chloride 100 ml @ 7 mls/hr CONT  PRN IV SEE PROTOCOL

Last administered on 4/8/20at 15:35;  Start 3/28/20 at 16:00


Sodium Chloride 90 meq/Potassium Chloride 15 meq/ Potassium Phosphate 18 mmol/ 

Magnesium Sulfate 8 meq/Calcium Gluconate 15 meq/ Multivitamins 10 ml/Chromium/ 

Copper/Manganese/ Seleni/Zn 0.5 ml/ Insulin Human Regular 15 unit/ Total 

Parenteral Nutrition/Amino Acids/Dextrose/ Fat Emulsion Intravenous 1,400 ml @  

58.333 mls/ hr TPN  CONT IV  Last administered on 3/28/20at 20:34;  Start 

3/28/20 at 22:00;  Stop 3/29/20 at 21:59;  Status DC


Info ([facility] Electrolyte Protocol) 1 ea CONT PRN  PRN MC PER PROTOCOL;  Start 

3/29/20 at 13:15


Sodium Chloride 90 meq/Potassium Chloride 15 meq/ Potassium Phosphate 18 mmol/ 

Magnesium Sulfate 8 meq/Calcium Gluconate 15 meq/ Multivitamins 10 ml/Chromium/ 

Copper/Manganese/ Seleni/Zn 0.5 ml/ Insulin Human Regular 15 unit/ Total 

Parenteral Nutrition/Amino Acids/Dextrose/ Fat Emulsion Intravenous 1,400 ml @  

58.333 mls/ hr TPN  CONT IV  Last administered on 3/29/20at 22:05;  Start 

3/29/20 at 22:00;  Stop 3/30/20 at 21:59;  Status DC


Potassium Chloride 15 meq/ Bicarbonate Dialysis Soln w/ out KCl 5,007.5 ml  @ 

1,000 mls/ hr Q5H1M IV  Last administered on 4/1/20at 18:14;  Start 3/29/20 at 

20:00;  Stop 4/2/20 at 13:08;  Status DC


Potassium Chloride 15 meq/ Bicarbonate Dialysis Soln w/ out KCl 5,007.5 ml  @ 

1,000 mls/ hr Q5H1M IV  Last administered on 4/1/20at 18:14;  Start 3/29/20 at 

20:00;  Stop 4/2/20 at 13:08;  Status DC


Potassium Chloride 15 meq/ Bicarbonate Dialysis Soln w/ out KCl 5,007.5 ml  @ 

1,000 mls/ hr Q5H1M IV  Last administered on 4/1/20at 18:14;  Start 3/29/20 at 

20:00;  Stop 4/2/20 at 13:08;  Status DC


Iohexol (Omnipaque 240 Mg/ml) 30 ml 1X  ONCE PO  Last administered on 3/30/20at 

11:30;  Start 3/30/20 at 11:30;  Stop 3/30/20 at 11:33;  Status DC


Info (CONTRAST GIVEN -- Rx MONITORING) 1 each PRN DAILY  PRN MC SEE COMMENTS;  

Start 3/30/20 at 11:45;  Stop 4/1/20 at 11:44;  Status DC


Sodium Chloride 90 meq/Potassium Chloride 15 meq/ Potassium Phosphate 18 mmol/ 

Magnesium Sulfate 8 meq/Calcium Gluconate 15 meq/ Multivitamins 10 ml/Chromium/ 

Copper/Manganese/ Seleni/Zn 0.5 ml/ Insulin Human Regular 15 unit/ Total 

Parenteral Nutrition/Amino Acids/Dextrose/ Fat Emulsion Intravenous 1,400 ml @  

58.333 mls/ hr TPN  CONT IV  Last administered on 3/30/20at 21:47;  Start 3

/30/20 at 22:00;  Stop 3/31/20 at 21:59;  Status DC


Sodium Chloride 90 meq/Potassium Chloride 15 meq/ Potassium Phosphate 18 mmol/ 

Magnesium Sulfate 8 meq/Calcium Gluconate 15 meq/ Multivitamins 10 ml/Chromium/ 

Copper/Manganese/ Seleni/Zn 0.5 ml/ Insulin Human Regular 20 unit/ Total 

Parenteral Nutrition/Amino Acids/Dextrose/ Fat Emulsion Intravenous 1,400 ml @  

58.333 mls/ hr TPN  CONT IV  Last administered on 3/31/20at 21:36;  Start 

3/31/20 at 22:00;  Stop 4/1/20 at 21:59;  Status DC


Alteplase, Recombinant (Cathflo For Central Catheter Clearance) 1 mg 1X  ONCE 

INT CAT  Last administered on 3/31/20at 20:03;  Start 3/31/20 at 19:30;  Stop 

3/31/20 at 19:46;  Status DC


Alteplase, Recombinant (Cathflo For Central Catheter Clearance) 1 mg 1X  ONCE 

INT CAT  Last administered on 3/31/20at 22:05;  Start 3/31/20 at 22:00;  Stop 

3/31/20 at 22:01;  Status DC


Sodium Chloride 90 meq/Potassium Chloride 15 meq/ Potassium Phosphate 18 mmol/ 

Magnesium Sulfate 8 meq/Calcium Gluconate 15 meq/ Multivitamins 10 ml/Chromium/ 

Copper/Manganese/ Seleni/Zn 0.5 ml/ Insulin Human Regular 20 unit/ Total 

Parenteral Nutrition/Amino Acids/Dextrose/ Fat Emulsion Intravenous 1,400 ml @  

58.333 mls/ hr TPN  CONT IV  Last administered on 4/1/20at 21:30;  Start 4/1/20 

at 22:00;  Stop 4/2/20 at 21:59;  Status DC


Dexmedetomidine HCl 400 mcg/ Sodium Chloride 100 ml @ 0 mls/hr CONT  PRN IV 

ANXIETY / AGITATION Last administered on 5/24/20at 03:33;  Start 4/2/20 at 08:15


Sodium Chloride 500 ml @  500 mls/hr 1X PRN  PRN IV ELEVATED BP, SEE COMMENTS;  

Start 4/2/20 at 08:15


Atropine Sulfate (ATROPINE 0.5mg SYRINGE) 0.5 mg PRN Q5MIN  PRN IV SEE COMMENTS;

 Start 4/2/20 at 08:15


Furosemide (Lasix) 20 mg 1X  ONCE IVP  Last administered on 4/2/20at 08:19;  

Start 4/2/20 at 08:15;  Stop 4/2/20 at 08:16;  Status DC


Lidocaine HCl (Buffered Lidocaine 1%) 3 ml STK-MED ONCE .ROUTE ;  Start 4/2/20 

at 08:39;  Stop 4/2/20 at 08:39;  Status DC


Lidocaine HCl (Buffered Lidocaine 1%) 6 ml 1X  ONCE INJ  Last administered on 

4/2/20at 09:05;  Start 4/2/20 at 09:00;  Stop 4/2/20 at 09:06;  Status DC


Sodium Chloride 90 meq/Potassium Chloride 15 meq/ Potassium Phosphate 18 mmol/ 

Magnesium Sulfate 8 meq/Calcium Gluconate 15 meq/ Multivitamins 10 ml/Chromium/ 

Copper/Manganese/ Seleni/Zn 0.5 ml/ Insulin Human Regular 20 unit/ Total Pare

nteral Nutrition/Amino Acids/Dextrose/ Fat Emulsion Intravenous 1,400 ml @  

58.333 mls/ hr TPN  CONT IV  Last administered on 4/2/20at 22:45;  Start 4/2/20 

at 22:00;  Stop 4/3/20 at 21:59;  Status DC


Sodium Chloride 1,000 ml @  1,000 mls/hr Q1H PRN IV hypotension;  Start 4/3/20 

at 07:30;  Stop 4/3/20 at 13:29;  Status DC


Albumin Human 200 ml @  200 mls/hr 1X PRN  PRN IV Hypotension Last administered 

on 4/3/20at 09:36;  Start 4/3/20 at 07:30;  Stop 4/3/20 at 13:29;  Status DC


Sodium Chloride (Normal Saline Flush) 10 ml 1X PRN  PRN IV AP catheter pack;  

Start 4/3/20 at 07:30;  Stop 4/3/20 at 21:29;  Status DC


Sodium Chloride (Normal Saline Flush) 10 ml 1X PRN  PRN IV  catheter pack;  

Start 4/3/20 at 07:30;  Stop 4/4/20 at 07:29;  Status DC


Sodium Chloride 1,000 ml @  400 mls/hr Q2H30M PRN IV PATENCY;  Start 4/3/20 at 

07:30;  Stop 4/3/20 at 19:29;  Status DC


Info (PHARMACY MONITORING -- do not chart) 1 each PRN DAILY  PRN MC SEE 

COMMENTS;  Start 4/3/20 at 07:30;  Stop 4/3/20 at 13:02;  Status DC


Info (PHARMACY MONITORING -- do not chart) 1 each PRN DAILY  PRN MC SEE 

COMMENTS;  Start 4/3/20 at 07:30;  Stop 4/5/20 at 12:45;  Status DC


Sodium Chloride 90 meq/Potassium Chloride 15 meq/ Potassium Phosphate 10 mmol/ 

Magnesium Sulfate 8 meq/Calcium Gluconate 15 meq/ Multivitamins 10 ml/Chromium/ 

Copper/Manganese/ Seleni/Zn 0.5 ml/ Insulin Human Regular 25 unit/ Total 

Parenteral Nutrition/Amino Acids/Dextrose/ Fat Emulsion Intravenous 1,400 ml @  

58.333 mls/ hr TPN  CONT IV  Last administered on 4/3/20at 22:19;  Start 4/3/20 

at 22:00;  Stop 4/4/20 at 21:59;  Status DC


Heparin Sodium (Porcine) (Heparin Sodium) 5,000 unit Q12HR SQ  Last administered

on 4/26/20at 08:59;  Start 4/3/20 at 21:00;  Stop 4/26/20 at 10:05;  Status DC


Ondansetron HCl (Zofran) 4 mg PRN Q6HRS  PRN IV NAUSEA/VOMITING;  Start 4/6/20 

at 07:00;  Stop 4/7/20 at 06:59;  Status DC


Fentanyl Citrate (Fentanyl 2ml Vial) 25 mcg PRN Q5MIN  PRN IV MILD PAIN 1-3;  

Start 4/6/20 at 07:00;  Stop 4/7/20 at 06:59;  Status DC


Fentanyl Citrate (Fentanyl 2ml Vial) 50 mcg PRN Q5MIN  PRN IV MODERATE TO SEVERE

PAIN;  Start 4/6/20 at 07:00;  Stop 4/7/20 at 06:59;  Status DC


Ringer's Solution 1,000 ml @  30 mls/hr Q24H IV ;  Start 4/6/20 at 07:00;  Stop 

4/6/20 at 18:59;  Status DC


Lidocaine HCl (Xylocaine-Mpf 1% 2ml Vial) 2 ml PRN 1X  PRN ID PRIOR TO IV START;

 Start 4/6/20 at 07:00;  Stop 4/7/20 at 06:59;  Status DC


Prochlorperazine Edisylate (Compazine) 5 mg PACU PRN  PRN IV NAUSEA, MRX1;  

Start 4/6/20 at 07:00;  Stop 4/7/20 at 06:59;  Status DC


Sodium Chloride 1,000 ml @  1,000 mls/hr Q1H PRN IV hypotension;  Start 4/4/20 

at 09:10;  Stop 4/4/20 at 15:09;  Status DC


Albumin Human 200 ml @  200 mls/hr 1X PRN  PRN IV Hypotension Last administered 

on 4/4/20at 10:10;  Start 4/4/20 at 09:15;  Stop 4/4/20 at 15:14;  Status DC


Sodium Chloride 1,000 ml @  400 mls/hr Q2H30M PRN IV PATENCY;  Start 4/4/20 at 

09:10;  Stop 4/4/20 at 21:09;  Status DC


Info (PHARMACY MONITORING -- do not chart) 1 each PRN DAILY  PRN MC SEE 

COMMENTS;  Start 4/4/20 at 09:15;  Stop 4/5/20 at 12:45;  Status DC


Info (PHARMACY MONITORING -- do not chart) 1 each PRN DAILY  PRN MC SEE 

COMMENTS;  Start 4/4/20 at 09:15;  Stop 4/5/20 at 12:45;  Status DC


Sodium Chloride 90 meq/Potassium Chloride 15 meq/ Potassium Phosphate 10 mmol/ 

Magnesium Sulfate 8 meq/Calcium Gluconate 15 meq/ Multivitamins 10 ml/Chromium/ 

Copper/Manganese/ Seleni/Zn 0.5 ml/ Insulin Human Regular 25 unit/ Total 

Parenteral Nutrition/Amino Acids/Dextrose/ Fat Emulsion Intravenous 1,400 ml @  

58.333 mls/ hr TPN  CONT IV  Last administered on 4/4/20at 22:10;  Start 4/4/20 

at 22:00;  Stop 4/5/20 at 21:59;  Status DC


Magnesium Sulfate 50 ml @ 25 mls/hr PRN DAILY  PRN IV for Mag < 1.7 on am labs 

Last administered on 4/20/20at 17:27;  Start 4/5/20 at 09:15


Sodium Chloride 90 meq/Potassium Chloride 15 meq/ Potassium Phosphate 10 mmol/ 

Magnesium Sulfate 8 meq/Calcium Gluconate 15 meq/ Multivitamins 10 ml/Chromium/ 

Copper/Manganese/ Seleni/Zn 0.5 ml/ Insulin Human Regular 25 unit/ Total 

Parenteral Nutrition/Amino Acids/Dextrose/ Fat Emulsion Intravenous 1,400 ml @  

58.333 mls/ hr TPN  CONT IV  Last administered on 4/5/20at 21:20;  Start 4/5/20 

at 22:00;  Stop 4/6/20 at 21:59;  Status DC


Sodium Chloride 1,000 ml @  1,000 mls/hr Q1H PRN IV hypotension;  Start 4/5/20 

at 12:23;  Stop 4/5/20 at 18:22;  Status DC


Albumin Human 200 ml @  200 mls/hr 1X  ONCE IV  Last administered on 4/5/20at 

13:34;  Start 4/5/20 at 12:30;  Stop 4/5/20 at 13:29;  Status DC


Diphenhydramine HCl (Benadryl) 25 mg 1X PRN  PRN IV ITCHING;  Start 4/5/20 at 

12:30;  Stop 4/6/20 at 12:29;  Status DC


Diphenhydramine HCl (Benadryl) 25 mg 1X PRN  PRN IV ITCHING;  Start 4/5/20 at 

12:30;  Stop 4/6/20 at 12:29;  Status DC


Info (PHARMACY MONITORING -- do not chart) 1 each PRN DAILY  PRN MC SEE 

COMMENTS;  Start 4/5/20 at 12:30;  Status Cancel


Bupivacaine HCl/ Epinephrine Bitart (Sensorcain-Epi 0.5%-1:334334 Mpf) 30 ml 

STK-MED ONCE .ROUTE  Last administered on 4/6/20at 11:44;  Start 4/6/20 at 

11:00;  Stop 4/6/20 at 11:01;  Status DC


Cellulose (Surgicel Fibrillar 1x2) 1 each STK-MED ONCE .ROUTE ;  Start 4/6/20 at

11:00;  Stop 4/6/20 at 11:01;  Status DC


Sodium Chloride 90 meq/Potassium Chloride 15 meq/ Potassium Phosphate 10 mmol/ 

Magnesium Sulfate 12 meq/Calcium Gluconate 15 meq/ Multivitamins 10 ml/Chromium/

Copper/Manganese/ Seleni/Zn 0.5 ml/ Insulin Human Regular 25 unit/ Total 

Parenteral Nutrition/Amino Acids/Dextrose/ Fat Emulsion Intravenous 1,400 ml @  

58.333 mls/ hr TPN  CONT IV  Last administered on 4/6/20at 22:24;  Start 4/6/20 

at 22:00;  Stop 4/7/20 at 21:59;  Status DC


Propofol 20 ml @ As Directed STK-MED ONCE IV ;  Start 4/6/20 at 11:07;  Stop 

4/6/20 at 11:07;  Status DC


Cellulose (Surgicel Hemostat 4x8) 1 each STK-MED ONCE .ROUTE  Last administered 

on 4/6/20at 11:44;  Start 4/6/20 at 11:55;  Stop 4/6/20 at 11:56;  Status DC


Sevoflurane (Ultane) 60 ml STK-MED ONCE IH ;  Start 4/6/20 at 12:46;  Stop 

4/6/20 at 12:46;  Status DC


Sodium Chloride 1,000 ml @  1,000 mls/hr Q1H PRN IV hypotension;  Start 4/6/20 

at 13:51;  Stop 4/6/20 at 19:50;  Status DC


Albumin Human 200 ml @  200 mls/hr 1X PRN  PRN IV Hypotension Last administered 

on 4/6/20at 14:51;  Start 4/6/20 at 14:00;  Stop 4/6/20 at 19:59;  Status DC


Diphenhydramine HCl (Benadryl) 25 mg 1X PRN  PRN IV ITCHING;  Start 4/6/20 at 

14:00;  Stop 4/7/20 at 13:59;  Status DC


Diphenhydramine HCl (Benadryl) 25 mg 1X PRN  PRN IV ITCHING;  Start 4/6/20 at 

14:00;  Stop 4/7/20 at 13:59;  Status DC


Sodium Chloride 1,000 ml @  400 mls/hr Q2H30M PRN IV PATENCY;  Start 4/6/20 at 

13:51;  Stop 4/7/20 at 01:50;  Status DC


Info (PHARMACY MONITORING -- do not chart) 1 each PRN DAILY  PRN MC SEE 

COMMENTS;  Start 4/6/20 at 14:00;  Stop 4/9/20 at 08:16;  Status DC


Heparin Sodium (Porcine) (Hep Lock Adult) 500 unit STK-MED ONCE IVP ;  Start 

4/7/20 at 09:29;  Stop 4/7/20 at 09:30;  Status DC


Sodium Chloride 1,000 ml @  1,000 mls/hr Q1H PRN IV hypotension;  Start 4/7/20 

at 10:43;  Stop 4/7/20 at 16:42;  Status DC


Sodium Chloride 1,000 ml @  400 mls/hr Q2H30M PRN IV PATENCY;  Start 4/7/20 at 

10:43;  Stop 4/7/20 at 22:42;  Status DC


Info (PHARMACY MONITORING -- do not chart) 1 each PRN DAILY  PRN MC SEE 

COMMENTS;  Start 4/7/20 at 10:45;  Status UNV


Info (PHARMACY MONITORING -- do not chart) 1 each PRN DAILY  PRN MC SEE 

COMMENTS;  Start 4/7/20 at 10:45;  Status UNV


Sodium Chloride 90 meq/Potassium Chloride 15 meq/ Magnesium Sulfate 12 meq/C

alcium Gluconate 15 meq/ Multivitamins 10 ml/Chromium/ Copper/Manganese/ 

Seleni/Zn 0.5 ml/ Insulin Human Regular 25 unit/ Total Parenteral 

Nutrition/Amino Acids/Dextrose/ Fat Emulsion Intravenous 1,400 ml @  58.333 mls/

hr TPN  CONT IV  Last administered on 4/7/20at 22:13;  Start 4/7/20 at 22:00;  

Stop 4/8/20 at 21:59;  Status DC


Sodium Chloride 1,000 ml @  1,000 mls/hr Q1H PRN IV hypotension;  Start 4/8/20 

at 07:50;  Stop 4/8/20 at 13:49;  Status DC


Albumin Human 200 ml @  200 mls/hr 1X  ONCE IV ;  Start 4/8/20 at 08:00;  Stop 

4/8/20 at 08:53;  Status DC


Diphenhydramine HCl (Benadryl) 25 mg 1X PRN  PRN IV ITCHING;  Start 4/8/20 at 

08:00;  Stop 4/9/20 at 07:59;  Status DC


Diphenhydramine HCl (Benadryl) 25 mg 1X PRN  PRN IV ITCHING;  Start 4/8/20 at 

08:00;  Stop 4/9/20 at 07:59;  Status DC


Info (PHARMACY MONITORING -- do not chart) 1 each PRN DAILY  PRN MC SEE 

COMMENTS;  Start 4/8/20 at 08:00;  Stop 4/9/20 at 08:16;  Status DC


Albumin Human 50 ml @ 50 mls/hr 1X  ONCE IV ;  Start 4/8/20 at 08:53;  Stop 

4/8/20 at 08:56;  Status DC


Albumin Human 200 ml @  50 mls/hr PRN 1X  PRN IV HYPOTENSION Last administered 

on 4/14/20at 11:54;  Start 4/8/20 at 09:00;  Stop 5/21/20 at 11:14;  Status DC


Meropenem 500 mg/ Sodium Chloride 50 ml @  100 mls/hr Q12H IV  Last administered

on 4/28/20at 10:45;  Start 4/8/20 at 10:00;  Stop 4/28/20 at 12:37;  Status DC


Sodium Chloride 90 meq/Magnesium Sulfate 12 meq/ Calcium Gluconate 15 meq/ 

Multivitamins 10 ml/Chromium/ Copper/Manganese/ Seleni/Zn 0.5 ml/ Insulin Human 

Regular 25 unit/ Total Parenteral Nutrition/Amino Acids/Dextrose/ Fat Emulsion 

Intravenous 1,400 ml @  58.333 mls/ hr TPN  CONT IV  Last administered on 

4/8/20at 21:41;  Start 4/8/20 at 22:00;  Stop 4/9/20 at 21:59;  Status DC


Sodium Chloride 1,000 ml @  1,000 mls/hr Q1H PRN IV hypotension;  Start 4/9/20 

at 07:58;  Stop 4/9/20 at 13:57;  Status DC


Albumin Human 200 ml @  200 mls/hr 1X PRN  PRN IV Hypotension Last administered 

on 4/9/20at 09:30;  Start 4/9/20 at 08:00;  Stop 4/9/20 at 13:59;  Status DC


Sodium Chloride 1,000 ml @  400 mls/hr Q2H30M PRN IV PATENCY;  Start 4/9/20 at 

07:58;  Stop 4/9/20 at 19:57;  Status DC


Info (PHARMACY MONITORING -- do not chart) 1 each PRN DAILY  PRN MC SEE 

COMMENTS;  Start 4/9/20 at 08:00;  Status Cancel


Info (PHARMACY MONITORING -- do not chart) 1 each PRN DAILY  PRN MC SEE 

COMMENTS;  Start 4/9/20 at 08:15;  Status UNV


Sodium Chloride 90 meq/Potassium Phosphate 5 mmol/ Magnesium Sulfate 12 

meq/Calcium Gluconate 15 meq/ Multivitamins 10 ml/Chromium/ Copper/Manganese/ 

Seleni/Zn 0.5 ml/ Insulin Human Regular 30 unit/ Total Parenteral 

Nutrition/Amino Acids/Dextrose/ Fat Emulsion Intravenous 1,400 ml @  58.333 mls/

hr TPN  CONT IV  Last administered on 4/9/20at 22:08;  Start 4/9/20 at 22:00;  

Stop 4/10/20 at 21:59;  Status DC


Linezolid/Dextrose 300 ml @  300 mls/hr Q12HR IV  Last administered on 4/20/20at

20:40;  Start 4/10/20 at 11:00;  Stop 4/21/20 at 08:10;  Status DC


Sodium Chloride 90 meq/Potassium Phosphate 15 mmol/ Magnesium Sulfate 12 

meq/Calcium Gluconate 15 meq/ Multivitamins 10 ml/Chromium/ Copper/Manganese/ 

Seleni/Zn 0.5 ml/ Insulin Human Regular 30 unit/ Total Parenteral 

Nutrition/Amino Acids/Dextrose/ Fat Emulsion Intravenous 1,400 ml @  58.333 mls/

hr TPN  CONT IV  Last administered on 4/10/20at 21:49;  Start 4/10/20 at 22:00; 

Stop 4/11/20 at 21:59;  Status DC


Sodium Chloride 90 meq/Potassium Phosphate 15 mmol/ Magnesium Sulfate 12 

meq/Calcium Gluconate 15 meq/ Multivitamins 10 ml/Chromium/ Copper/Manganese/ 

Seleni/Zn 0.5 ml/ Insulin Human Regular 40 unit/ Total Parenteral Nutrition/Ami

no Acids/Dextrose/ Fat Emulsion Intravenous 1,400 ml @  58.333 mls/ hr TPN  CONT

IV  Last administered on 4/11/20at 21:21;  Start 4/11/20 at 22:00;  Stop 4/12/20

at 21:59;  Status DC


Sodium Chloride 1,000 ml @  1,000 mls/hr Q1H PRN IV hypotension;  Start 4/11/20 

at 13:26;  Stop 4/11/20 at 19:25;  Status DC


Albumin Human 200 ml @  200 mls/hr 1X PRN  PRN IV Hypotension Last administered 

on 4/11/20at 15:00;  Start 4/11/20 at 13:30;  Stop 4/11/20 at 19:29;  Status DC


Sodium Chloride (Normal Saline Flush) 10 ml 1X PRN  PRN IV AP catheter pack;  

Start 4/11/20 at 13:30;  Stop 4/12/20 at 13:29;  Status DC


Sodium Chloride (Normal Saline Flush) 10 ml 1X PRN  PRN IV  catheter pack;  

Start 4/11/20 at 13:30;  Stop 4/12/20 at 13:29;  Status DC


Sodium Chloride 1,000 ml @  400 mls/hr Q2H30M PRN IV PATENCY;  Start 4/11/20 at 

13:26;  Stop 4/12/20 at 01:25;  Status DC


Info (PHARMACY MONITORING -- do not chart) 1 each PRN DAILY  PRN MC SEE 

COMMENTS;  Start 4/11/20 at 13:30;  Stop 4/11/20 at 13:33;  Status DC


Info (PHARMACY MONITORING -- do not chart) 1 each PRN DAILY  PRN MC SEE 

COMMENTS;  Start 4/11/20 at 13:30;  Stop 4/11/20 at 13:34;  Status DC


Sodium Chloride 90 meq/Potassium Phosphate 19 mmol/ Magnesium Sulfate 12 

meq/Calcium Gluconate 15 meq/ Multivitamins 10 ml/Chromium/ Copper/Manganese/ 

Seleni/Zn 0.5 ml/ Insulin Human Regular 40 unit/ Total Parenteral 

Nutrition/Amino Acids/Dextrose/ Fat Emulsion Intravenous 1,400 ml @  58.333 mls/

hr TPN  CONT IV  Last administered on 4/12/20at 21:54;  Start 4/12/20 at 22:00; 

Stop 4/13/20 at 21:59;  Status DC


Sodium Chloride 1,000 ml @  1,000 mls/hr Q1H PRN IV hypotension;  Start 4/13/20 

at 09:35;  Stop 4/13/20 at 15:34;  Status DC


Albumin Human 200 ml @  200 mls/hr 1X PRN  PRN IV Hypotension;  Start 4/13/20 at

09:45;  Stop 4/13/20 at 15:44;  Status DC


Diphenhydramine HCl (Benadryl) 25 mg 1X PRN  PRN IV ITCHING;  Start 4/13/20 at 

09:45;  Stop 4/14/20 at 09:44;  Status DC


Diphenhydramine HCl (Benadryl) 25 mg 1X PRN  PRN IV ITCHING;  Start 4/13/20 at 

09:45;  Stop 4/14/20 at 09:44;  Status DC


Sodium Chloride 1,000 ml @  400 mls/hr Q2H30M PRN IV PATENCY;  Start 4/13/20 at 

09:35;  Stop 4/13/20 at 21:34;  Status DC


Info (PHARMACY MONITORING -- do not chart) 1 each PRN DAILY  PRN MC SEE 

COMMENTS;  Start 4/13/20 at 09:45;  Status Cancel


Sodium Chloride 100 meq/Potassium Phosphate 19 mmol/ Magnesium Sulfate 12 

meq/Calcium Gluconate 15 meq/ Multivitamins 10 ml/Chromium/ Copper/Manganese/ 

Seleni/Zn 0.5 ml/ Insulin Human Regular 40 unit/ Potassium Chloride 20 meq/ 

Total Parenteral Nutrition/Amino Acids/Dextrose/ Fat Emulsion Intravenous 1,400 

ml @  58.333 mls/ hr TPN  CONT IV  Last administered on 4/13/20at 22:02;  Start 

4/13/20 at 22:00;  Stop 4/14/20 at 21:59;  Status DC


Furosemide (Lasix) 40 mg 1X  ONCE IVP  Last administered on 4/13/20at 14:39;  St

art 4/13/20 at 14:30;  Stop 4/13/20 at 14:31;  Status DC


Metronidazole 100 ml @  100 mls/hr Q8HRS IV  Last administered on 4/21/20at 

06:04;  Start 4/14/20 at 10:00;  Stop 4/21/20 at 08:10;  Status DC


Sodium Chloride 1,000 ml @  1,000 mls/hr Q1H PRN IV hypotension;  Start 4/14/20 

at 08:00;  Stop 4/14/20 at 13:59;  Status DC


Albumin Human 200 ml @  200 mls/hr 1X PRN  PRN IV Hypotension;  Start 4/14/20 at

08:00;  Stop 4/14/20 at 13:59;  Status DC


Sodium Chloride 1,000 ml @  400 mls/hr Q2H30M PRN IV PATENCY;  Start 4/14/20 at 

08:00;  Stop 4/14/20 at 19:59;  Status DC


Info (PHARMACY MONITORING -- do not chart) 1 each PRN DAILY  PRN MC SEE 

COMMENTS;  Start 4/14/20 at 11:30;  Status UNV


Info (PHARMACY MONITORING -- do not chart) 1 each PRN DAILY  PRN MC SEE COMMENT

S;  Start 4/14/20 at 11:30;  Stop 4/16/20 at 12:13;  Status DC


Sodium Chloride 100 meq/Potassium Phosphate 19 mmol/ Magnesium Sulfate 12 

meq/Calcium Gluconate 15 meq/ Multivitamins 10 ml/Chromium/ Copper/Manganese/ 

Seleni/Zn 0.5 ml/ Insulin Human Regular 40 unit/ Potassium Chloride 20 meq/ 

Total Parenteral Nutrition/Amino Acids/Dextrose/ Fat Emulsion Intravenous 1,400 

ml @  58.333 mls/ hr TPN  CONT IV  Last administered on 4/14/20at 21:52;  Start 

4/14/20 at 22:00;  Stop 4/15/20 at 21:59;  Status DC


Sodium Chloride (Normal Saline Flush) 10 ml QSHIFT  PRN IV AFTER MEDS AND BLOOD 

DRAWS;  Start 4/14/20 at 15:00;  Stop 5/12/20 at 11:27;  Status DC


Sodium Chloride (Normal Saline Flush) 10 ml PRN Q5MIN  PRN IV AFTER MEDS AND 

BLOOD DRAWS;  Start 4/14/20 at 15:00


Sodium Chloride (Normal Saline Flush) 20 ml PRN Q5MIN  PRN IV AFTER MEDS AND 

BLOOD DRAWS;  Start 4/14/20 at 15:00


Sodium Chloride 100 meq/Potassium Phosphate 19 mmol/ Magnesium Sulfate 12 

meq/Calcium Gluconate 15 meq/ Multivitamins 10 ml/Chromium/ Copper/Manganese/ 

Seleni/Zn 0.5 ml/ Insulin Human Regular 40 unit/ Potassium Chloride 20 meq/ 

Total Parenteral Nutrition/Amino Acids/Dextrose/ Fat Emulsion Intravenous 1,400 

ml @  58.333 mls/ hr TPN  CONT IV  Last administered on 4/15/20at 21:20;  Start 

4/15/20 at 22:00;  Stop 4/16/20 at 21:59;  Status DC


Lidocaine HCl (Buffered Lidocaine 1%) 3 ml STK-MED ONCE .ROUTE ;  Start 4/15/20 

at 13:16;  Stop 4/15/20 at 13:16;  Status DC


Lidocaine HCl (Buffered Lidocaine 1%) 6 ml 1X  ONCE INJ  Last administered on 

4/15/20at 13:45;  Start 4/15/20 at 13:30;  Stop 4/15/20 at 13:31;  Status DC


Albumin Human 100 ml @  100 mls/hr 1X  ONCE IV  Last administered on 4/15/20at 

15:41;  Start 4/15/20 at 15:00;  Stop 4/15/20 at 15:59;  Status DC


Albumin Human 50 ml @ 50 mls/hr 1X  ONCE IV  Last administered on 4/15/20at 

15:00;  Start 4/15/20 at 15:00;  Stop 4/15/20 at 15:59;  Status DC


Info (PHARMACY MONITORING -- do not chart) 1 each PRN DAILY  PRN MC SEE 

COMMENTS;  Start 4/16/20 at 11:30;  Status Cancel


Info (PHARMACY MONITORING -- do not chart) 1 each PRN DAILY  PRN MC SEE 

COMMENTS;  Start 4/16/20 at 11:30;  Status UNV


Sodium Chloride 100 meq/Potassium Phosphate 10 mmol/ Magnesium Sulfate 12 

meq/Calcium Gluconate 15 meq/ Multivitamins 10 ml/Chromium/ Copper/Manganese/ 

Seleni/Zn 0.5 ml/ Insulin Human Regular 35 unit/ Potassium Chloride 20 meq/ 

Total Parenteral Nutrition/Amino Acids/Dextrose/ Fat Emulsion Intravenous 1,400 

ml @  58.333 mls/ hr TPN  CONT IV  Last administered on 4/16/20at 22:10;  Start 

4/16/20 at 22:00;  Stop 4/17/20 at 21:59;  Status DC


Sodium Chloride 100 meq/Potassium Phosphate 5 mmol/ Magnesium Sulfate 12 

meq/Calcium Gluconate 15 meq/ Multivitamins 10 ml/Chromium/ Copper/Manganese/ 

Seleni/Zn 0.5 ml/ Insulin Human Regular 35 unit/ Potassium Chloride 20 meq/ 

Total Parenteral Nutrition/Amino Acids/Dextrose/ Fat Emulsion Intravenous 1,400 

ml @  58.333 mls/ hr TPN  CONT IV  Last administered on 4/17/20at 22:59;  Start 

4/17/20 at 22:00;  Stop 4/18/20 at 21:59;  Status DC


Sodium Chloride 1,000 ml @  1,000 mls/hr Q1H PRN IV hypotension;  Start 4/18/20 

at 08:27;  Stop 4/18/20 at 14:26;  Status DC


Albumin Human 200 ml @  200 mls/hr 1X PRN  PRN IV Hypotension Last administered 

on 4/18/20at 09:18;  Start 4/18/20 at 08:30;  Stop 4/18/20 at 14:29;  Status DC


Sodium Chloride 1,000 ml @  400 mls/hr Q2H30M PRN IV PATENCY;  Start 4/18/20 at 

08:27;  Stop 4/18/20 at 20:26;  Status DC


Info (PHARMACY MONITORING -- do not chart) 1 each PRN DAILY  PRN MC SEE 

COMMENTS;  Start 4/18/20 at 08:30;  Status Cancel


Info (PHARMACY MONITORING -- do not chart) 1 each PRN DAILY  PRN MC SEE 

COMMENTS;  Start 4/18/20 at 08:30;  Stop 4/26/20 at 13:10;  Status DC


Sodium Chloride 100 meq/Potassium Chloride 40 meq/ Magnesium Sulfate 15 

meq/Calcium Gluconate 15 meq/ Multivitamins 10 ml/Chromium/ Copper/Manganese/ 

Seleni/Zn 0.5 ml/ Insulin Human Regular 35 unit/ Total Parenteral 

Nutrition/Amino Acids/Dextrose/ Fat Emulsion Intravenous 1,400 ml @  58.333 mls/

hr TPN  CONT IV  Last administered on 4/18/20at 22:00;  Start 4/18/20 at 22:00; 

Stop 4/19/20 at 21:59;  Status DC


Potassium Chloride/Water 100 ml @  100 mls/hr 1X  ONCE IV  Last administered on 

4/18/20at 17:28;  Start 4/18/20 at 14:45;  Stop 4/18/20 at 15:44;  Status DC


Sodium Chloride 100 meq/Potassium Chloride 40 meq/ Magnesium Sulfate 15 

meq/Calcium Gluconate 15 meq/ Multivitamins 10 ml/Chromium/ Copper/Manganese/ 

Seleni/Zn 0.5 ml/ Insulin Human Regular 35 unit/ Total Parenteral Nutrition/Ami

no Acids/Dextrose/ Fat Emulsion Intravenous 1,400 ml @  58.333 mls/ hr TPN  CONT

IV  Last administered on 4/19/20at 22:46;  Start 4/19/20 at 22:00;  Stop 4/20/20

at 21:59;  Status DC


Sodium Chloride 100 meq/Potassium Chloride 40 meq/ Magnesium Sulfate 20 m

eq/Calcium Gluconate 15 meq/ Multivitamins 10 ml/Chromium/ Copper/Manganese/ 

Seleni/Zn 0.5 ml/ Insulin Human Regular 35 unit/ Total Parenteral 

Nutrition/Amino Acids/Dextrose/ Fat Emulsion Intravenous 1,400 ml @  58.333 mls/

hr TPN  CONT IV  Last administered on 4/20/20at 22:31;  Start 4/20/20 at 22:00; 

Stop 4/21/20 at 21:59;  Status DC


Fentanyl Citrate (Fentanyl 2ml Vial) 50 mcg PRN Q2HR  PRN IVP PAIN Last 

administered on 4/27/20at 13:32;  Start 4/20/20 at 21:00;  Stop 4/28/20 at 

12:53;  Status DC


Fentanyl Citrate (Fentanyl 2ml Vial) 25 mcg PRN Q2HR  PRN IVP PAIN;  Start 

4/20/20 at 21:00;  Stop 4/28/20 at 12:54;  Status DC


Enoxaparin Sodium (Lovenox 100mg Syringe) 100 mg Q12HR SQ ;  Start 4/21/20 at 

21:00;  Status UNV


Amino Acids/ Glycerin/ Electrolytes 1,000 ml @  75 mls/hr [id number] IV ;  Start 

4/20/20 at 21:15;  Status UNV


Sodium Chloride 1,000 ml @  1,000 mls/hr Q1H PRN IV hypotension;  Start 4/21/20 

at 07:56;  Stop 4/21/20 at 13:55;  Status DC


Albumin Human 200 ml @  200 mls/hr 1X PRN  PRN IV Hypotension Last administered 

on 4/21/20at 08:40;  Start 4/21/20 at 08:00;  Stop 4/21/20 at 13:59;  Status DC


Sodium Chloride 1,000 ml @  400 mls/hr Q2H30M PRN IV PATENCY;  Start 4/21/20 at 

07:56;  Stop 4/21/20 at 19:55;  Status DC


Info (PHARMACY MONITORING -- do not chart) 1 each PRN DAILY  PRN MC SEE 

COMMENTS;  Start 4/21/20 at 08:00;  Status UNV


Info (PHARMACY MONITORING -- do not chart) 1 each PRN DAILY  PRN MC SEE 

COMMENTS;  Start 4/21/20 at 08:00;  Status UNV


Daptomycin 430 mg/ Sodium Chloride 50 ml @  100 mls/hr Q24H IV  Last 

administered on 4/21/20at 12:35;  Start 4/21/20 at 09:00;  Stop 4/21/20 at 

12:49;  Status DC


Sodium Chloride 100 meq/Potassium Chloride 40 meq/ Magnesium Sulfate 20 

meq/Calcium Gluconate 15 meq/ Multivitamins 10 ml/Chromium/ Copper/Manganese/ 

Seleni/Zn 0.5 ml/ Insulin Human Regular 35 unit/ Total Parenteral 

Nutrition/Amino Acids/Dextrose/ Fat Emulsion Intravenous 1,400 ml @  58.333 mls/

hr TPN  CONT IV  Last administered on 4/21/20at 21:26;  Start 4/21/20 at 22:00; 

Stop 4/22/20 at 21:59;  Status DC


Daptomycin 430 mg/ Sodium Chloride 50 ml @  100 mls/hr Q48H IV ;  Start 4/23/20 

at 09:00;  Stop 4/22/20 at 11:55;  Status DC


Sodium Chloride 100 meq/Potassium Chloride 40 meq/ Magnesium Sulfate 20 

meq/Calcium Gluconate 15 meq/ Multivitamins 10 ml/Chromium/ Copper/Manganese/ 

Seleni/Zn 0.5 ml/ Insulin Human Regular 35 unit/ Total Parenteral 

Nutrition/Amino Acids/Dextrose/ Fat Emulsion Intravenous 1,400 ml @  58.333 mls/

hr TPN  CONT IV  Last administered on 4/22/20at 22:27;  Start 4/22/20 at 22:00; 

Stop 4/23/20 at 21:59;  Status DC


Daptomycin 430 mg/ Sodium Chloride 50 ml @  100 mls/hr Q24H IV  Last 

administered on 4/24/20at 15:07;  Start 4/22/20 at 13:00;  Stop 4/25/20 at 

13:15;  Status DC


Sodium Chloride 100 meq/Potassium Chloride 40 meq/ Magnesium Sulfate 20 

meq/Calcium Gluconate 10 meq/ Multivitamins 10 ml/Chromium/ Copper/Manganese/ 

Seleni/Zn 0.5 ml/ Insulin Human Regular 35 unit/ Total Parenteral 

Nutrition/Amino Acids/Dextrose/ Fat Emulsion Intravenous 1,400 ml @  58.333 mls/

hr TPN  CONT IV  Last administered on 4/24/20at 00:06;  Start 4/23/20 at 22:00; 

Stop 4/24/20 at 21:59;  Status DC


Alteplase, Recombinant (Cathflo For Central Catheter Clearance) 1 mg 1X  ONCE 

INT CAT  Last administered on 4/24/20at 11:44;  Start 4/24/20 at 10:45;  Stop 

4/24/20 at 10:46;  Status DC


Ondansetron HCl (Zofran) 4 mg PRN Q6HRS  PRN IV NAUSEA/VOMITING;  Start 4/27/20 

at 07:00;  Stop 4/28/20 at 06:59;  Status DC


Fentanyl Citrate (Fentanyl 2ml Vial) 25 mcg PRN Q5MIN  PRN IV MILD PAIN 1-3;  

Start 4/27/20 at 07:00;  Stop 4/28/20 at 06:59;  Status DC


Fentanyl Citrate (Fentanyl 2ml Vial) 50 mcg PRN Q5MIN  PRN IV MODERATE TO SEVERE

PAIN Last administered on 4/27/20at 10:17;  Start 4/27/20 at 07:00;  Stop 

4/28/20 at 06:59;  Status DC


Ringer's Solution 1,000 ml @  30 mls/hr Q24H IV ;  Start 4/27/20 at 07:00;  Stop

4/27/20 at 18:59;  Status DC


Lidocaine HCl (Xylocaine-Mpf 1% 2ml Vial) 2 ml PRN 1X  PRN ID PRIOR TO IV START;

 Start 4/27/20 at 07:00;  Stop 4/28/20 at 06:59;  Status DC


Prochlorperazine Edisylate (Compazine) 5 mg PACU PRN  PRN IV NAUSEA, MRX1;  

Start 4/27/20 at 07:00;  Stop 4/28/20 at 06:59;  Status DC


Sodium Acetate 50 meq/Potassium Acetate 55 meq/ Magnesium Sulfate 20 meq/Calcium

Gluconate 10 meq/ Multivitamins 10 ml/Chromium/ Copper/Manganese/ Seleni/Zn 0.5 

ml/ Insulin Human Regular 35 unit/ Total Parenteral Nutrition/Amino 

Acids/Dextrose/ Fat Emulsion Intravenous 1,400 ml @  58.333 mls/ hr TPN  CONT IV

;  Start 4/24/20 at 22:00;  Stop 4/24/20 at 14:15;  Status DC


Sodium Acetate 50 meq/Potassium Acetate 55 meq/ Magnesium Sulfate 20 meq/Calcium

Gluconate 10 meq/ Multivitamins 10 ml/Chromium/ Copper/Manganese/ Seleni/Zn 0.5 

ml/ Insulin Human Regular 35 unit/ Total Parenteral Nutrition/Amino 

Acids/Dextrose/ Fat Emulsion Intravenous 1,800 ml @  75 mls/hr TPN  CONT IV  La

st administered on 4/24/20at 22:38;  Start 4/24/20 at 22:00;  Stop 4/25/20 at 

21:59;  Status DC


Sodium Chloride 1,000 ml @  1,000 mls/hr Q1H PRN IV hypotension;  Start 4/24/20 

at 15:31;  Stop 4/24/20 at 21:30;  Status DC


Diphenhydramine HCl (Benadryl) 25 mg 1X PRN  PRN IV ITCHING;  Start 4/24/20 at 

15:45;  Stop 4/25/20 at 15:44;  Status DC


Diphenhydramine HCl (Benadryl) 25 mg 1X PRN  PRN IV ITCHING;  Start 4/24/20 at 

15:45;  Stop 4/25/20 at 15:44;  Status DC


Sodium Chloride 1,000 ml @  400 mls/hr Q2H30M PRN IV PATENCY;  Start 4/24/20 at 

15:31;  Stop 4/25/20 at 03:30;  Status DC


Info (PHARMACY MONITORING -- do not chart) 1 each PRN DAILY  PRN MC SEE 

COMMENTS;  Start 4/24/20 at 15:45


Sodium Acetate 50 meq/Potassium Acetate 55 meq/ Magnesium Sulfate 20 meq/Calcium

Gluconate 10 meq/ Multivitamins 10 ml/Chromium/ Copper/Manganese/ Seleni/Zn 0.5 

ml/ Insulin Human Regular 35 unit/ Total Parenteral Nutrition/Amino 

Acids/Dextrose/ Fat Emulsion Intravenous 1,800 ml @  75 mls/hr TPN  CONT IV  

Last administered on 4/25/20at 22:03;  Start 4/25/20 at 22:00;  Stop 4/26/20 at 

21:59;  Status DC


Daptomycin 430 mg/ Sodium Chloride 50 ml @  100 mls/hr Q24H IV  Last 

administered on 4/30/20at 13:00;  Start 4/25/20 at 13:00;  Stop 4/30/20 at 

20:58;  Status DC


Heparin Sodium (Porcine) 1000 unit/Sodium Chloride 1,001 ml @  1,001 mls/hr 1X  

ONCE IRR ;  Start 4/27/20 at 06:00;  Stop 4/27/20 at 06:59;  Status DC


Potassium Acetate 55 meq/Magnesium Sulfate 20 meq/ Calcium Gluconate 10 meq/ 

Multivitamins 10 ml/Chromium/ Copper/Manganese/ Seleni/Zn 0.5 ml/ Insulin Human 

Regular 35 unit/ Total Parenteral Nutrition/Amino Acids/Dextrose/ Fat Emulsion 

Intravenous 1,920 ml @  80 mls/hr TPN  CONT IV  Last administered on 4/26/20at 

22:10;  Start 4/26/20 at 22:00;  Stop 4/27/20 at 21:59;  Status DC


Dexamethasone Sodium Phosphate (Decadron) 4 mg STK-MED ONCE .ROUTE ;  Start 

4/27/20 at 10:56;  Stop 4/27/20 at 10:57;  Status DC


Ondansetron HCl (Zofran) 4 mg STK-MED ONCE .ROUTE ;  Start 4/27/20 at 10:56;  

Stop 4/27/20 at 10:57;  Status DC


Rocuronium Bromide (Zemuron) 50 mg STK-MED ONCE .ROUTE ;  Start 4/27/20 at 

10:56;  Stop 4/27/20 at 10:57;  Status DC


Fentanyl Citrate (Fentanyl 2ml Vial) 100 mcg STK-MED ONCE .ROUTE ;  Start 

4/27/20 at 10:56;  Stop 4/27/20 at 10:57;  Status DC


Bupivacaine HCl/ Epinephrine Bitart (Sensorcain-Epi 0.5%-1:459199 Mpf) 30 ml 

STK-MED ONCE .ROUTE  Last administered on 4/27/20at 12:01;  Start 4/27/20 at 10:

58;  Stop 4/27/20 at 10:58;  Status DC


Cellulose (Surgicel Hemostat 2x14) 1 each STK-MED ONCE .ROUTE ;  Start 4/27/20 

at 10:58;  Stop 4/27/20 at 10:59;  Status DC


Iohexol (Omnipaque 300 Mg/ml) 50 ml STK-MED ONCE .ROUTE ;  Start 4/27/20 at 

10:58;  Stop 4/27/20 at 10:59;  Status DC


Cellulose (Surgicel Hemostat 4x8) 1 each STK-MED ONCE .ROUTE ;  Start 4/27/20 at

10:58;  Stop 4/27/20 at 10:59;  Status DC


Bisacodyl (Dulcolax Supp) 10 mg STK-MED ONCE .ROUTE ;  Start 4/27/20 at 10:59;  

Stop 4/27/20 at 10:59;  Status DC


Heparin Sodium (Porcine) 1000 unit/Sodium Chloride 1,001 ml @  1,001 mls/hr 1X  

ONCE IRR ;  Start 4/27/20 at 12:00;  Stop 4/27/20 at 12:59;  Status DC


Propofol 20 ml @ As Directed STK-MED ONCE IV ;  Start 4/27/20 at 11:05;  Stop 

4/27/20 at 11:05;  Status DC


Sevoflurane (Ultane) 90 ml STK-MED ONCE IH ;  Start 4/27/20 at 11:05;  Stop 

4/27/20 at 11:05;  Status DC


Sevoflurane (Ultane) 60 ml STK-MED ONCE IH ;  Start 4/27/20 at 12:26;  Stop 

4/27/20 at 12:27;  Status DC


Propofol 20 ml @ As Directed STK-MED ONCE IV ;  Start 4/27/20 at 12:26;  Stop 

4/27/20 at 12:27;  Status DC


Phenylephrine HCl (PHENYLEPHRINE in 0.9% NACL PF) 1 mg STK-MED ONCE IV ;  Start 

4/27/20 at 12:34;  Stop 4/27/20 at 12:34;  Status DC


Heparin Sodium (Porcine) (Heparin Sodium) 5,000 unit Q12HR SQ  Last administered

on 5/6/20at 20:57;  Start 4/27/20 at 21:00;  Stop 5/7/20 at 09:59;  Status DC


Sodium Chloride (Normal Saline Flush) 3 ml QSHIFT  PRN IV AFTER MEDS AND BLOOD 

DRAWS;  Start 4/27/20 at 13:45


Naloxone HCl (Narcan) 0.4 mg PRN Q2MIN  PRN IV SEE INSTRUCTIONS;  Start 4/27/20 

at 13:45


Sodium Chloride 1,000 ml @  25 mls/hr Q24H IV  Last administered on 5/19/20at 13

:37;  Start 4/27/20 at 13:37


Naloxone HCl (Narcan) 0.4 mg PRN Q2MIN  PRN IV SEE INSTRUCTIONS;  Start 4/27/20 

at 14:30;  Status UNV


Sodium Chloride 1,000 ml @  25 mls/hr Q24H IV ;  Start 4/27/20 at 14:30;  Status

UNV


Hydromorphone HCl 30 ml @ 0 mls/hr CONT PRN  PRN IV PER PROTOCOL Last 

administered on 5/2/20at 16:08;  Start 4/27/20 at 14:30;  Stop 5/4/20 at 08:55; 

Status DC


Potassium Acetate 55 meq/Magnesium Sulfate 20 meq/ Calcium Gluconate 10 meq/ 

Multivitamins 10 ml/Chromium/ Copper/Manganese/ Seleni/Zn 0.5 ml/ Insulin Human 

Regular 35 unit/ Total Parenteral Nutrition/Amino Acids/Dextrose/ Fat Emulsion 

Intravenous 1,920 ml @  80 mls/hr TPN  CONT IV  Last administered on 4/27/20at 

22:01;  Start 4/27/20 at 22:00;  Stop 4/28/20 at 21:59;  Status DC


Bumetanide (Bumex) 2 mg [id number] IV  Last administered on 5/1/20at 13:50;  Start 

4/28/20 at 14:00;  Stop 5/2/20 at 14:10;  Status DC


Meropenem 1 gm/ Sodium Chloride 100 ml @  200 mls/hr Q8HRS IV  Last administered

on 5/22/20at 05:53;  Start 4/28/20 at 14:00;  Stop 5/22/20 at 09:31;  Status DC


Potassium Acetate 55 meq/Magnesium Sulfate 20 meq/ Calcium Gluconate 10 meq/ 

Multivitamins 10 ml/Chromium/ Copper/Manganese/ Seleni/Zn 0.5 ml/ Insulin Human 

Regular 35 unit/ Total Parenteral Nutrition/Amino Acids/Dextrose/ Fat Emulsion 

Intravenous 1,920 ml @  80 mls/hr TPN  CONT IV  Last administered on 4/28/20at 

22:02;  Start 4/28/20 at 22:00;  Stop 4/29/20 at 21:59;  Status DC


Hydromorphone HCl (Dilaudid Standard PCA) 12 mg STK-MED ONCE IV ;  Start 4/27/20

at 14:35;  Stop 4/28/20 at 13:53;  Status DC


Artificial Tears (Artificial Tears) 1 drop PRN Q15MIN  PRN OU DRY EYE Last 

administered on 5/24/20at 11:15;  Start 4/29/20 at 05:30


Hydromorphone HCl (Dilaudid Standard PCA) 12 mg STK-MED ONCE IV ;  Start 4/28/20

at 12:05;  Stop 4/29/20 at 09:15;  Status DC


Potassium Acetate 65 meq/Magnesium Sulfate 20 meq/ Calcium Gluconate 10 meq/ 

Multivitamins 10 ml/Chromium/ Copper/Manganese/ Seleni/Zn 0.5 ml/ Insulin Human 

Regular 30 unit/ Total Parenteral Nutrition/Amino Acids/Dextrose/ Fat Emulsion 

Intravenous 1,920 ml @  80 mls/hr TPN  CONT IV  Last administered on 4/29/20at 

22:22;  Start 4/29/20 at 22:00;  Stop 4/30/20 at 21:59;  Status DC


Cyclobenzaprine HCl (Flexeril) 10 mg PRN Q6HRS  PRN PO MUSCLE SPASMS;  Start 

4/30/20 at 10:45


Potassium Acetate 55 meq/Magnesium Sulfate 20 meq/ Calcium Gluconate 10 meq/ 

Multivitamins 10 ml/Chromium/ Copper/Manganese/ Seleni/Zn 0.5 ml/ Insulin Human 

Regular 30 unit/ Total Parenteral Nutrition/Amino Acids/Dextrose/ Fat Emulsion 

Intravenous 1,920 ml @  80 mls/hr TPN  CONT IV  Last administered on 5/1/20at 

01:00;  Start 4/30/20 at 22:00;  Stop 5/1/20 at 21:59;  Status DC


Magnesium Sulfate 50 ml @ 25 mls/hr 1X  ONCE IV  Last administered on 4/30/20at 

17:18;  Start 4/30/20 at 12:45;  Stop 4/30/20 at 14:44;  Status DC


Potassium Chloride/Water 100 ml @  100 mls/hr 1X  ONCE IV  Last administered on 

5/1/20at 11:27;  Start 5/1/20 at 12:00;  Stop 5/1/20 at 12:59;  Status DC


Hydromorphone HCl (Dilaudid Standard PCA) 12 mg STK-MED ONCE IV ;  Start 4/29/20

at 10:50;  Stop 5/1/20 at 11:02;  Status DC


Hydromorphone HCl (Dilaudid Standard PCA) 12 mg STK-MED ONCE IV ;  Start 4/30/20

at 13:47;  Stop 5/1/20 at 11:03;  Status DC


Potassium Acetate 30 meq/Magnesium Sulfate 20 meq/ Calcium Gluconate 10 meq/ 

Multivitamins 10 ml/Chromium/ Copper/Manganese/ Seleni/Zn 0.5 ml/ Insulin Human 

Regular 30 unit/ Potassium Chloride 30 meq/ Total Parenteral Nutrition/Amino 

Acids/Dextrose/ Fat Emulsion Intravenous 1,920 ml @  80 mls/hr TPN  CONT IV  

Last administered on 5/1/20at 22:34;  Start 5/1/20 at 22:00;  Stop 5/2/20 at 

21:59;  Status DC


Potassium Chloride/Water 100 ml @  100 mls/hr Q1H IV  Last administered on 

5/2/20at 13:05;  Start 5/2/20 at 07:00;  Stop 5/2/20 at 10:59;  Status DC


Magnesium Sulfate 50 ml @ 25 mls/hr 1X  ONCE IV  Last administered on 5/2/20at 

10:34;  Start 5/2/20 at 10:30;  Stop 5/2/20 at 12:29;  Status DC


Potassium Chloride 75 meq/ Magnesium Sulfate 20 meq/Calcium Gluconate 10 meq/ 

Multivitamins 10 ml/Chromium/ Copper/Manganese/ Seleni/Zn 0.5 ml/ Insulin Human 

Regular 30 unit/ Total Parenteral Nutrition/Amino Acids/Dextrose/ Fat Emulsion 

Intravenous 1,920 ml @  80 mls/hr TPN  CONT IV  Last administered on 5/2/20at 

21:51;  Start 5/2/20 at 22:00;  Stop 5/3/20 at 22:00;  Status DC


Potassium Chloride 75 meq/ Magnesium Sulfate 20 meq/Calcium Gluconate 10 meq/ 

Multivitamins 10 ml/Chromium/ Copper/Manganese/ Seleni/Zn 0.5 ml/ Insulin Human 

Regular 25 unit/ Total Parenteral Nutrition/Amino Acids/Dextrose/ Fat Emulsion 

Intravenous 1,920 ml @  80 mls/hr TPN  CONT IV  Last administered on 5/3/20at 

22:04;  Start 5/3/20 at 22:00;  Stop 5/4/20 at 21:59;  Status DC


Hydromorphone HCl (Dilaudid) 0.4 mg PRN Q4HRS  PRN IVP PAIN Last administered on

5/4/20at 10:57;  Start 5/4/20 at 09:00;  Stop 5/4/20 at 18:59;  Status DC


Micafungin Sodium 100 mg/Dextrose 100 ml @  100 mls/hr Q24H IV  Last 

administered on 5/23/20at 10:33;  Start 5/4/20 at 11:00


Daptomycin 485 mg/ Sodium Chloride 50 ml @  100 mls/hr Q24H IV  Last 

administered on 5/11/20at 13:10;  Start 5/4/20 at 11:00;  Stop 5/12/20 at 07:44;

 Status DC


Potassium Chloride 75 meq/ Magnesium Sulfate 15 meq/Calcium Gluconate 8 meq/ 

Multivitamins 10 ml/Chromium/ Copper/Manganese/ Seleni/Zn 0.5 ml/ Insulin Human 

Regular 25 unit/ Total Parenteral Nutrition/Amino Acids/Dextrose/ Fat Emulsion 

Intravenous 1,920 ml @  80 mls/hr TPN  CONT IV  Last administered on 5/4/20at 

23:08;  Start 5/4/20 at 22:00;  Stop 5/5/20 at 21:59;  Status DC


Haloperidol Lactate (Haldol Inj) 3 mg 1X  ONCE IVP  Last administered on 

5/4/20at 14:37;  Start 5/4/20 at 14:30;  Stop 5/4/20 at 14:31;  Status DC


Hydromorphone HCl (Dilaudid) 1 mg PRN Q4HRS  PRN IVP PAIN Last administered on 

5/18/20at 06:25;  Start 5/4/20 at 19:00;  Stop 5/18/20 at 17:10;  Status DC


Potassium Chloride 75 meq/ Magnesium Sulfate 15 meq/Calcium Gluconate 8 meq/ 

Multivitamins 10 ml/Chromium/ Copper/Manganese/ Seleni/Zn 0.5 ml/ Insulin Human 

Regular 20 unit/ Total Parenteral Nutrition/Amino Acids/Dextrose/ Fat Emulsion 

Intravenous 1,920 ml @  80 mls/hr TPN  CONT IV  Last administered on 5/5/20at 

22:10;  Start 5/5/20 at 22:00;  Stop 5/6/20 at 21:59;  Status DC


Lidocaine HCl (Buffered Lidocaine 1%) 3 ml STK-MED ONCE .ROUTE ;  Start 5/6/20 

at 11:31;  Stop 5/6/20 at 11:31;  Status DC


Lidocaine HCl (Buffered Lidocaine 1%) 3 ml STK-MED ONCE .ROUTE ;  Start 5/6/20 

at 12:28;  Stop 5/6/20 at 12:29;  Status DC


Lidocaine HCl (Buffered Lidocaine 1%) 6 ml 1X  ONCE INJ  Last administered on 

5/6/20at 12:53;  Start 5/6/20 at 12:45;  Stop 5/6/20 at 12:46;  Status DC


Potassium Chloride 75 meq/ Magnesium Sulfate 15 meq/Calcium Gluconate 8 meq/ 

Multivitamins 10 ml/Chromium/ Copper/Manganese/ Seleni/Zn 0.5 ml/ Insulin Human 

Regular 20 unit/ Total Parenteral Nutrition/Amino Acids/Dextrose/ Fat Emulsion 

Intravenous 1,920 ml @  80 mls/hr TPN  CONT IV  Last administered on 5/6/20at 

22:00;  Start 5/6/20 at 22:00;  Stop 5/7/20 at 21:59;  Status DC


Potassium Chloride 75 meq/ Magnesium Sulfate 15 meq/Calcium Gluconate 8 meq/ Mul

tivitamins 10 ml/Chromium/ Copper/Manganese/ Seleni/Zn 0.5 ml/ Insulin Human 

Regular 15 unit/ Total Parenteral Nutrition/Amino Acids/Dextrose/ Fat Emulsion 

Intravenous 1,920 ml @  80 mls/hr TPN  CONT IV  Last administered on 5/7/20at 

22:28;  Start 5/7/20 at 22:00;  Stop 5/8/20 at 21:59;  Status DC


Vecuronium Bromide (Norcuron Bolus) 6 mg PRN Q6HRS  PRN IV VENT ASYNCHRONY;  

Start 5/7/20 at 19:15;  Stop 5/7/20 at 19:35;  Status DC


Bumetanide (Bumex) 2 mg 1X  ONCE IV  Last administered on 5/7/20at 22:09;  Start

5/7/20 at 19:45;  Stop 5/7/20 at 19:46;  Status DC


Lidocaine HCl (Buffered Lidocaine 1%) 3 ml STK-MED ONCE .ROUTE ;  Start 5/8/20 

at 07:59;  Stop 5/8/20 at 07:59;  Status DC


Midazolam HCl (Versed) 5 mg STK-MED ONCE .ROUTE ;  Start 5/8/20 at 08:36;  Stop 

5/8/20 at 08:36;  Status DC


Fentanyl Citrate (Fentanyl 5ml Vial) 250 mcg STK-MED ONCE .ROUTE ;  Start 5/8/20

at 08:36;  Stop 5/8/20 at 08:37;  Status DC


Lidocaine HCl (Buffered Lidocaine 1%) 3 ml 1X  ONCE IJ  Last administered on 

5/8/20at 09:30;  Start 5/8/20 at 09:15;  Stop 5/8/20 at 09:16;  Status DC


Midazolam HCl (Versed) 5 mg 1X  ONCE IV  Last administered on 5/8/20at 09:30;  S

tart 5/8/20 at 09:15;  Stop 5/8/20 at 09:16;  Status DC


Fentanyl Citrate (Fentanyl 5ml Vial) 250 mcg 1X  ONCE IV  Last administered on 

5/8/20at 09:30;  Start 5/8/20 at 09:15;  Stop 5/8/20 at 09:16;  Status DC


Bumetanide (Bumex) 2 mg DAILY IV  Last administered on 5/18/20at 08:07;  Start 

5/8/20 at 10:00;  Stop 5/18/20 at 17:15;  Status DC


Potassium Chloride 75 meq/ Magnesium Sulfate 15 meq/ Multivitamins 10 

ml/Chromium/ Copper/Manganese/ Seleni/Zn 0.5 ml/ Insulin Human Regular 15 unit/ 

Total Parenteral Nutrition/Amino Acids/Dextrose/ Fat Emulsion Intravenous 1,920 

ml @  80 mls/hr TPN  CONT IV  Last administered on 5/8/20at 21:59;  Start 5/8/20

at 22:00;  Stop 5/9/20 at 21:59;  Status DC


Metoclopramide HCl (Reglan Vial) 10 mg PRN Q3HRS  PRN IVP NAUSEA/VOMITING-3rd 

choice Last administered on 5/14/20at 04:25;  Start 5/9/20 at 16:45


Potassium Chloride 75 meq/ Magnesium Sulfate 15 meq/ Multivitamins 10 

ml/Chromium/ Copper/Manganese/ Seleni/Zn 0.5 ml/ Insulin Human Regular 15 unit/ 

Total Parenteral Nutrition/Amino Acids/Dextrose/ Fat Emulsion Intravenous 1,920 

ml @  80 mls/hr TPN  CONT IV  Last administered on 5/9/20at 22:41;  Start 5/9/20

at 22:00;  Stop 5/10/20 at 21:59;  Status DC


Magnesium Sulfate 50 ml @ 25 mls/hr 1X  ONCE IV  Last administered on 5/10/20at 

10:44;  Start 5/10/20 at 09:00;  Stop 5/10/20 at 10:59;  Status DC


Potassium Chloride/Water 100 ml @  100 mls/hr 1X  ONCE IV  Last administered on 

5/10/20at 09:37;  Start 5/10/20 at 09:00;  Stop 5/10/20 at 09:59;  Status DC


Duloxetine HCl (Cymbalta) 30 mg DAILY PO  Last administered on 5/11/20at 09:48; 

Start 5/10/20 at 14:00;  Stop 5/13/20 at 10:25;  Status DC


Potassium Chloride 80 meq/ Magnesium Sulfate 20 meq/ Multivitamins 10 

ml/Chromium/ Copper/Manganese/ Seleni/Zn 0.5 ml/ Insulin Human Regular 15 unit/ 

Total Parenteral Nutrition/Amino Acids/Dextrose/ Fat Emulsion Intravenous 1,920 

ml @  80 mls/hr TPN  CONT IV  Last administered on 5/10/20at 21:42;  Start 

5/10/20 at 22:00;  Stop 5/11/20 at 21:59;  Status DC


Potassium Chloride 80 meq/ Magnesium Sulfate 20 meq/ Multivitamins 10 

ml/Chromium/ Copper/Manganese/ Seleni/Zn 0.5 ml/ Insulin Human Regular 15 unit/ 

Total Parenteral Nutrition/Amino Acids/Dextrose/ Fat Emulsion Intravenous 1,920 

ml @  80 mls/hr TPN  CONT IV  Last administered on 5/11/20at 22:20;  Start 

5/11/20 at 22:00;  Stop 5/12/20 at 21:59;  Status DC


Lidocaine HCl (Buffered Lidocaine 1%) 3 ml STK-MED ONCE .ROUTE ;  Start 5/12/20 

at 09:54;  Stop 5/12/20 at 09:55;  Status DC


Hydromorphone HCl (Dilaudid Standard PCA) 12 mg STK-MED ONCE IV ;  Start 5/1/20 

at 15:50;  Stop 5/12/20 at 11:24;  Status DC


Potassium Chloride 80 meq/ Magnesium Sulfate 20 meq/ Multivitamins 10 

ml/Chromium/ Copper/Manganese/ Seleni/Zn 0.5 ml/ Insulin Human Regular 15 unit/ 

Total Parenteral Nutrition/Amino Acids/Dextrose/ Fat Emulsion Intravenous 1,920 

ml @  80 mls/hr TPN  CONT IV  Last administered on 5/12/20at 21:40;  Start 

5/12/20 at 22:00;  Stop 5/13/20 at 21:59;  Status DC


Lidocaine HCl (Buffered Lidocaine 1%) 6 ml 1X  ONCE INJ  Last administered on 

5/12/20at 14:15;  Start 5/12/20 at 14:15;  Stop 5/12/20 at 14:16;  Status DC


Potassium Chloride 80 meq/ Magnesium Sulfate 20 meq/ Multivitamins 10 ml/Chromiu

m/ Copper/Manganese/ Seleni/Zn 1 ml/ Insulin Human Regular 15 unit/ Total 

Parenteral Nutrition/Amino Acids/Dextrose/ Fat Emulsion Intravenous 1,920 ml @  

80 mls/hr TPN  CONT IV  Last administered on 5/13/20at 22:04;  Start 5/13/20 at 

22:00;  Stop 5/14/20 at 21:59;  Status DC


Potassium Chloride/Water 100 ml @  100 mls/hr 1X  ONCE IV  Last administered on 

5/14/20at 11:34;  Start 5/14/20 at 11:00;  Stop 5/14/20 at 11:59;  Status DC


Potassium Chloride 90 meq/ Magnesium Sulfate 20 meq/ Multivitamins 10 ml/Chromi

um/ Copper/Manganese/ Seleni/Zn 1 ml/ Insulin Human Regular 15 unit/ Total 

Parenteral Nutrition/Amino Acids/Dextrose/ Fat Emulsion Intravenous 1,920 ml @  

80 mls/hr TPN  CONT IV  Last administered on 5/14/20at 22:57;  Start 5/14/20 at 

22:00;  Stop 5/15/20 at 21:59;  Status DC


Potassium Chloride 90 meq/ Magnesium Sulfate 20 meq/ Multivitamins 10 

ml/Chromium/ Copper/Manganese/ Seleni/Zn 1 ml/ Insulin Human Regular 15 unit/ 

Total Parenteral Nutrition/Amino Acids/Dextrose/ Fat Emulsion Intravenous 1,920 

ml @  80 mls/hr TPN  CONT IV  Last administered on 5/15/20at 22:48;  Start 

5/15/20 at 22:00;  Stop 5/16/20 at 21:59;  Status DC


Potassium Chloride 90 meq/ Magnesium Sulfate 20 meq/ Multivitamins 10 

ml/Chromium/ Copper/Manganese/ Seleni/Zn 1 ml/ Insulin Human Regular 15 unit/ 

Total Parenteral Nutrition/Amino Acids/Dextrose/ Fat Emulsion Intravenous 1,890 

ml @  78.75 mls/ hr TPN  CONT IV  Last administered on 5/16/20at 22:15;  Start 

5/16/20 at 22:00;  Stop 5/17/20 at 21:59;  Status DC


Linezolid/Dextrose 300 ml @  300 mls/hr Q12HR IV  Last administered on 5/19/20at

21:08;  Start 5/17/20 at 09:00;  Stop 5/20/20 at 08:11;  Status DC


Daptomycin 450 mg/ Sodium Chloride 50 ml @  100 mls/hr Q24H IV  Last 

administered on 5/20/20at 09:25;  Start 5/17/20 at 09:00;  Stop 5/21/20 at 

08:30;  Status DC


Potassium Chloride 90 meq/ Magnesium Sulfate 20 meq/ Multivitamins 10 

ml/Chromium/ Copper/Manganese/ Seleni/Zn 1 ml/ Insulin Human Regular 15 unit/ 

Total Parenteral Nutrition/Amino Acids/Dextrose/ Fat Emulsion Intravenous 1,890 

ml @  78.75 mls/ hr TPN  CONT IV  Last administered on 5/17/20at 21:34;  Start 

5/17/20 at 22:00;  Stop 5/18/20 at 21:59;  Status DC


Lorazepam (Ativan Inj) 2 mg STK-MED ONCE .ROUTE ;  Start 5/17/20 at 14:58;  Stop

5/17/20 at 14:58;  Status DC


Metoprolol Tartrate (Lopressor Vial) 5 mg 1X  ONCE IVP  Last administered on 

5/17/20at 15:31;  Start 5/17/20 at 15:15;  Stop 5/17/20 at 15:16;  Status DC


Lorazepam (Ativan Inj) 2 mg 1X  ONCE IVP  Last administered on 5/17/20at 15:30; 

Start 5/17/20 at 15:15;  Stop 5/17/20 at 15:16;  Status DC


Enoxaparin Sodium (Lovenox 40mg Syringe) 40 mg Q24H SQ  Last administered on 

5/23/20at 17:50;  Start 5/17/20 at 17:00


Lorazepam (Ativan Inj) 1 mg PRN Q4HRS  PRN IVP ANXIETY / AGITATION MILD-MOD Last

administered on 5/23/20at 04:21;  Start 5/17/20 at 19:15


Lorazepam (Ativan Inj) 2 mg PRN Q4HRS  PRN IVP ANXIETY / AGITATION SEVERE Last 

administered on 5/18/20at 22:20;  Start 5/17/20 at 19:15


Fentanyl Citrate (Fentanyl 2ml Vial) 50 mcg PRN Q4HRS  PRN IVP SEVERE PAIN Last 

administered on 5/24/20at 11:16;  Start 5/18/20 at 13:15


Fentanyl Citrate (Fentanyl 2ml Vial) 25 mcg PRN Q4HRS  PRN IVP MODERATE PAIN 

Last administered on 5/24/20at 06:36;  Start 5/18/20 at 13:15


Potassium Chloride 90 meq/ Magnesium Sulfate 20 meq/ Multivitamins 10 

ml/Chromium/ Copper/Manganese/ Seleni/Zn 1 ml/ Insulin Human Regular 15 unit/ 

Total Parenteral Nutrition/Amino Acids/Dextrose/ Fat Emulsion Intravenous 1,890 

ml @  78.75 mls/ hr TPN  CONT IV  Last administered on 5/18/20at 22:18;  Start 

5/18/20 at 22:00;  Stop 5/19/20 at 21:59;  Status DC


Furosemide (Lasix) 40 mg 1X  ONCE IVP  Last administered on 5/18/20at 21:51;  

Start 5/18/20 at 21:45;  Stop 5/18/20 at 21:48;  Status DC


Albumin Human 100 ml @  100 mls/hr 1X PRN  PRN IV SEE COMMENTS;  Start 5/19/20 

at 01:30


Furosemide (Lasix) 40 mg [id number] IVP  Last administered on 5/24/20at 11:16;  Start

5/19/20 at 14:00


Potassium Chloride 90 meq/ Magnesium Sulfate 20 meq/ Multivitamins 10 

ml/Chromium/ Copper/Manganese/ Seleni/Zn 1 ml/ Insulin Human Regular 15 unit/ 

Total Parenteral Nutrition/Amino Acids/Dextrose/ Fat Emulsion Intravenous 1,800 

ml @  75 mls/hr TPN  CONT IV  Last administered on 5/19/20at 22:31;  Start 

5/19/20 at 22:00;  Stop 5/20/20 at 21:59;  Status DC


Potassium Chloride 90 meq/ Magnesium Sulfate 20 meq/ Multivitamins 10 

ml/Chromium/ Copper/Manganese/ Seleni/Zn 1 ml/ Insulin Human Regular 15 unit/ 

Total Parenteral Nutrition/Amino Acids/Dextrose/ Fat Emulsion Intravenous 1,800 

ml @  75 mls/hr TPN  CONT IV  Last administered on 5/20/20at 22:28;  Start 

5/20/20 at 22:00;  Stop 5/21/20 at 21:59;  Status DC


Potassium Chloride 110 meq/ Magnesium Sulfate 20 meq/ Multivitamins 10 

ml/Chromium/ Copper/Manganese/ Seleni/Zn 1 ml/ Insulin Human Regular 15 unit/ 

Total Parenteral Nutrition/Amino Acids/Dextrose/ Fat Emulsion Intravenous 1,800 

ml @  75 mls/hr TPN  CONT IV  Last administered on 5/21/20at 22:01;  Start 

5/21/20 at 22:00;  Stop 5/22/20 at 21:59;  Status DC


Saliva Substitute (Biotene Moisturizing Mouth) 2 spray PRN Q15MIN  PRN PO DRY 

MOUTH;  Start 5/21/20 at 11:00


Potassium Chloride 110 meq/ Magnesium Sulfate 20 meq/ Multivitamins 10 

ml/Chromium/ Copper/Manganese/ Seleni/Zn 1 ml/ Insulin Human Regular 15 unit/ 

Total Parenteral Nutrition/Amino Acids/Dextrose/ Fat Emulsion Intravenous 1,800 

ml @  75 mls/hr TPN  CONT IV  Last administered on 5/22/20at 22:21;  Start 

5/22/20 at 22:00;  Stop 5/23/20 at 21:59;  Status DC


Potassium Chloride 110 meq/ Magnesium Sulfate 20 meq/ Multivitamins 10 

ml/Chromium/ Copper/Manganese/ Seleni/Zn 1 ml/ Insulin Human Regular 15 unit/ 

Total Parenteral Nutrition/Amino Acids/Dextrose/ Fat Emulsion Intravenous 1,800 

ml @  75 mls/hr TPN  CONT IV  Last administered on 5/23/20at 22:04;  Start 

5/23/20 at 22:00;  Stop 5/24/20 at 21:59


Potassium Chloride 110 meq/ Magnesium Sulfate 20 meq/ Multivitamins 10 ml/

mium/ Copper/Manganese/ Seleni/Zn 1 ml/ Insulin Human Regular 15 unit/ Total 

Parenteral Nutrition/Amino Acids/Dextrose/ Fat Emulsion Intravenous 1,800 ml @  

75 mls/hr TPN  CONT IV ;  Start 5/24/20 at 22:00;  Stop 5/25/20 at 21:59





Active Scripts


Active


Reported


Bisoprolol Fumarate 5 Mg Tablet 10 Mg PO DAILY


Vitals/I & O





Vital Sign - Last 24 Hours








 5/23/20 5/23/20 5/23/20 5/23/20





 12:00 12:00 13:00 14:00


 


Temp   98.9 





   98.9 


 


Pulse  130 128 115


 


Resp  22 20 17


 


B/P (MAP)  128/63 (84) 145/66 (92) 116/66 (83)


 


Pulse Ox  95 97 100


 


O2 Delivery Trach Collar Tracheal Collar Tracheal Collar Tracheal Collar


 


O2 Flow Rate 8.0 8.0 8.0 8.0


 


    





    





 5/23/20 5/23/20 5/23/20 5/23/20





 15:00 16:00 16:00 17:00


 


Temp   98.7 





   98.7 


 


Pulse 108  118 124


 


Resp 19  20 24


 


B/P (MAP) 115/60 (78)   168/117 (134)


 


Pulse Ox 99  98 99


 


O2 Delivery Tracheal Collar Trach Collar Tracheal Collar Tracheal Collar


 


O2 Flow Rate 8.0 8.0 8.0 8.0


 


    





    





 5/23/20 5/23/20 5/23/20 5/23/20





 17:51 18:00 18:21 19:00


 


Pulse  116  100


 


Resp 25 22 25 25


 


B/P (MAP)  126/66 (86)  120/66 (84)


 


Pulse Ox 100 100 100 100


 


O2 Delivery Tracheal Collar Tracheal Collar Tracheal Collar Tracheal Collar


 


O2 Flow Rate 8.0 8.0  8.0





 5/23/20 5/23/20 5/23/20 5/23/20





 20:00 20:00 21:00 22:00


 


Temp  98.7  





  98.7  


 


Pulse  107 87 95


 


Resp  20 23 21


 


B/P (MAP)  122/56 (78) 87/46 (60) 98/51 (67)


 


Pulse Ox  98 100 100


 


O2 Delivery Trach Collar Tracheal Collar Tracheal Collar Tracheal Collar


 


O2 Flow Rate 8.0 8.0 8.0 8.0


 


    





    





 5/23/20 5/23/20 5/23/20 5/24/20





 22:40 23:00 23:10 00:00


 


Temp    96.7





    96.7


 


Pulse  102  97


 


Resp 28 24 24 23


 


B/P (MAP)  107/63 (78)  101/54 (70)


 


Pulse Ox 95 98 98 97


 


O2 Delivery Tracheal Collar Tracheal Collar Tracheal Collar Tracheal Collar


 


O2 Flow Rate  8.0  8.0


 


    





    





 5/24/20 5/24/20 5/24/20 5/24/20





 00:00 01:00 02:00 03:00


 


Pulse  95 95 92


 


Resp  21 24 20


 


B/P (MAP)  89/55 (66) 101/48 (65) 106/56 (73)


 


Pulse Ox  99 98 100


 


O2 Delivery Trach Collar Tracheal Collar Tracheal Collar Tracheal Collar


 


O2 Flow Rate 8.0 8.0 8.0 8.0





 5/24/20 5/24/20 5/24/20 5/24/20





 04:00 04:00 05:00 06:00


 


Temp 97.5   





 97.5   


 


Pulse 93  87 92


 


Resp 23 21 23


 


B/P (MAP) 100/49 (66)  96/52 (67) 108/59 (75)


 


Pulse Ox 100  99 98


 


O2 Delivery Tracheal Collar Trach Collar Tracheal Collar Tracheal Collar


 


O2 Flow Rate 8.0 8.0 8.0 8.0


 


    





    





 5/24/20 5/24/20 5/24/20 5/24/20





 06:36 07:00 07:05 08:00


 


Pulse  89  


 


Resp 25 22 21 


 


B/P (MAP)  98/62 (74)  


 


Pulse Ox 99 99 99 


 


O2 Delivery Tracheal Collar Tracheal Collar Tracheal Collar Trach Collar


 


O2 Flow Rate  8.0 8.0 8.0





 5/24/20 5/24/20  





 08:00 11:16  


 


Pulse 92   


 


Resp 23 22  


 


B/P (MAP) 96/54 (68)   


 


Pulse Ox 98 99  


 


O2 Delivery Tracheal Collar Tracheal Collar  


 


O2 Flow Rate 8.0 8.0  














Intake and Output   


 


 5/23/20 5/23/20 5/24/20





 15:00 23:00 07:00


 


Intake Total 100 ml 845 ml 1175.9 ml


 


Output Total 1080 ml 1415 ml 460 ml


 


Balance -980 ml -570 ml 715.9 ml











Hemodynamically unstable?:  No


Is patient in severe pain?:  No


Is NPO status required?:  Yes











MG ARGUETA MD                 May 24, 2020 11:31

## 2020-05-24 NOTE — PDOC
SURGICAL PROGRESS NOTE


Subjective


Tom for Dr Flores





awake, alert, asking for Jell-o and water


Vital Signs





Vital Signs








  Date Time  Temp Pulse Resp B/P (MAP) Pulse Ox O2 Delivery O2 Flow Rate FiO2


 


5/24/20 12:00      Trach Collar 8.0 


 


5/24/20 11:45   22  99   


 


5/24/20 08:00  92  96/54 (68)    


 


5/24/20 04:00 97.5       





 97.5       








I&O











Intake and Output 


 


 5/24/20





 07:00


 


Intake Total 2120.9 ml


 


Output Total 2955 ml


 


Balance -834.1 ml


 


 


 


Intake Oral 0 ml


 


IV Total 2120.9 ml


 


Output Urine Total 2580 ml


 


Gastric Drainage Total 100 ml


 


Drainage Total 275 ml


 


# Bowel Movements 1








General:  Alert, No acute distress


HEENT:  Other (trach in place)


Abdomen:  Soft


Labs





Laboratory Tests








Test


 5/22/20


18:00 5/23/20


00:29 5/23/20


06:06 5/23/20


17:53


 


Glucose (Fingerstick)


 155 mg/dL


(70-99) 144 mg/dL


(70-99) 163 mg/dL


(70-99) 140 mg/dL


(70-99)


 


Test


 5/23/20


23:53 5/24/20


05:46 5/24/20


11:25 





 


Glucose (Fingerstick)


 135 mg/dL


(70-99) 150 mg/dL


(70-99) 


 





 


White Blood Count


 


 


 16.0 x10^3/uL


(4.0-11.0) 





 


Red Blood Count


 


 


 2.94 x10^6/uL


(3.50-5.40) 





 


Hemoglobin


 


 


 8.5 g/dL


(12.0-15.5) 





 


Hematocrit


 


 


 25.5 %


(36.0-47.0) 





 


Mean Corpuscular Volume   87 fL ()  


 


Mean Corpuscular Hemoglobin   29 pg (25-35)  


 


Mean Corpuscular Hemoglobin


Concent 


 


 34 g/dL


(31-37) 





 


Red Cell Distribution Width


 


 


 19.0 %


(11.5-14.5) 





 


Platelet Count


 


 


 317 x10^3/uL


(140-400) 





 


Sodium Level


 


 


 136 mmol/L


(136-145) 





 


Potassium Level


 


 


 4.8 mmol/L


(3.5-5.1) 





 


Chloride Level


 


 


 98 mmol/L


() 





 


Carbon Dioxide Level


 


 


 34 mmol/L


(21-32) 





 


Anion Gap   4 (6-14)  


 


Blood Urea Nitrogen


 


 


 23 mg/dL


(7-20) 





 


Creatinine


 


 


 0.8 mg/dL


(0.6-1.0) 





 


Estimated GFR


(Cockcroft-Gault) 


 


 76.2 


 





 


BUN/Creatinine Ratio   29 (6-20)  


 


Glucose Level


 


 


 131 mg/dL


(70-99) 





 


Calcium Level


 


 


 10.1 mg/dL


(8.5-10.1) 





 


Total Bilirubin


 


 


 0.7 mg/dL


(0.2-1.0) 





 


Aspartate Amino Transf


(AST/SGOT) 


 


 34 U/L (15-37) 


 





 


Alanine Aminotransferase


(ALT/SGPT) 


 


 25 U/L (14-59) 


 





 


Alkaline Phosphatase


 


 


 131 U/L


() 





 


Total Protein


 


 


 6.9 g/dL


(6.4-8.2) 





 


Albumin


 


 


 2.4 g/dL


(3.4-5.0) 





 


Albumin/Globulin Ratio   0.5 (1.0-1.7)  








Laboratory Tests








Test


 5/23/20


17:53 5/23/20


23:53 5/24/20


05:46 5/24/20


11:25


 


Glucose (Fingerstick)


 140 mg/dL


(70-99) 135 mg/dL


(70-99) 150 mg/dL


(70-99) 





 


White Blood Count


 


 


 


 16.0 x10^3/uL


(4.0-11.0)


 


Red Blood Count


 


 


 


 2.94 x10^6/uL


(3.50-5.40)


 


Hemoglobin


 


 


 


 8.5 g/dL


(12.0-15.5)


 


Hematocrit


 


 


 


 25.5 %


(36.0-47.0)


 


Mean Corpuscular Volume    87 fL () 


 


Mean Corpuscular Hemoglobin    29 pg (25-35) 


 


Mean Corpuscular Hemoglobin


Concent 


 


 


 34 g/dL


(31-37)


 


Red Cell Distribution Width


 


 


 


 19.0 %


(11.5-14.5)


 


Platelet Count


 


 


 


 317 x10^3/uL


(140-400)


 


Sodium Level


 


 


 


 136 mmol/L


(136-145)


 


Potassium Level


 


 


 


 4.8 mmol/L


(3.5-5.1)


 


Chloride Level


 


 


 


 98 mmol/L


()


 


Carbon Dioxide Level


 


 


 


 34 mmol/L


(21-32)


 


Anion Gap    4 (6-14) 


 


Blood Urea Nitrogen


 


 


 


 23 mg/dL


(7-20)


 


Creatinine


 


 


 


 0.8 mg/dL


(0.6-1.0)


 


Estimated GFR


(Cockcroft-Gault) 


 


 


 76.2 





 


BUN/Creatinine Ratio    29 (6-20) 


 


Glucose Level


 


 


 


 131 mg/dL


(70-99)


 


Calcium Level


 


 


 


 10.1 mg/dL


(8.5-10.1)


 


Total Bilirubin


 


 


 


 0.7 mg/dL


(0.2-1.0)


 


Aspartate Amino Transf


(AST/SGOT) 


 


 


 34 U/L (15-37) 





 


Alanine Aminotransferase


(ALT/SGPT) 


 


 


 25 U/L (14-59) 





 


Alkaline Phosphatase


 


 


 


 131 U/L


()


 


Total Protein


 


 


 


 6.9 g/dL


(6.4-8.2)


 


Albumin


 


 


 


 2.4 g/dL


(3.4-5.0)


 


Albumin/Globulin Ratio    0.5 (1.0-1.7) 








Problem List


Problems


Medical Problems:


(1) Acute pancreatitis


Status: Acute  





(2) Cholelithiasis


Status: Acute  








Assessment/Plan


gall stone pancreatitis


gradual improvement





needs aspiration precautions for po











KIEL BAL MD                May 24, 2020 13:06

## 2020-05-24 NOTE — PDOC
Infectious Disease Note


Subjective


Subjective


Trach shield, FiO2 33%


No fevers last 24 hrs





Vital Sign


Vital Signs





Vital Signs








  Date Time  Temp Pulse Resp B/P (MAP) Pulse Ox O2 Delivery O2 Flow Rate FiO2


 


5/24/20 08:00  92 23 96/54 (68) 98 Tracheal Collar 8.0 


 


5/24/20 04:00 97.5       





 97.5       











Physical Exam


PHYSICAL EXAM


GENERAL: Propped up in bed, resting quietly 


HEENT:  Oral cavity clear,  NGT


NECK:  Trach shield


LUNGS: Clear anteriorly, no accessory muscle use 


HEART:  S1, S2, regular 


ABDOMEN: mod distention, hypoactive BS, tender, + drains x 3


: [place] (4/14)


EXTREMITIES: Generalized edema, improving, no cyanosis, SCDs bilaterally 


SKIN: Warm and dry.  No generalized rash.  


CNS: Very weak 


RUE-PICC (4/30) clean





Labs


Lab





Laboratory Tests








Test


 5/23/20


17:53 5/23/20


23:53 5/24/20


05:46


 


Glucose (Fingerstick)


 140 mg/dL


(70-99) 135 mg/dL


(70-99) 150 mg/dL


(70-99)








Micro


5/4. BLOOD CULTURE  Preliminary  


        NO GROWTH AFTER 5 DAYS 








4/27. abd fluid


  AEROBIC RES 1  Final  


        Organism Identification, Yeast


          Candida parapsilosis








5/6. abd fluid


     ANAEROBIC-AEROBIC CULTURE  


                    PENDING





Objective


Assessment


Fever intermittent could be from underlying pancreatitis - better 


? Ileus with vomiting


Abd distention - U/S and CT reviewed s/p 0.4 L of opaque, debris-containing 

ascites was removed 5/6


Acute pancreatitis with persistent necrosis


  - 4/27 status post ROBERT drain placement + C paropsilosis; 5/6 + yeast & high 

amylase; s/p additional drains on 5/8


Anemia - S/p PRBCs


Cholelithiasis with thickening of the gallbladder wall.


Leucocytosis improved 


JED, hyperkalemia, Metabolic acidosis off dialysis


Acute hypoxic resp failure ,bilateral pleural effusion and atelectasis


hypocalcemia 


Prediabetes


HTN


s/p trach





Plan


Plan of Care


cont  Micafungin, 5/4


Off dapto/zyvox/merrem


Maintain aspiration precaution


Supportive care





Critically ill 





Attending Co-Sign


The patient was seen and examined at the bedside. The chart was reviewed. The 

case was discussed with NP. Agree with the plan of care.














HAVEN WINTERS        May 24, 2020 08:34


IVAN FRANZ MD           May 24, 2020 12:34 Order was faxed over

## 2020-05-24 NOTE — PDOC
PULMONARY PROGRESS NOTES


Subjective


alert, stronger, able to cough up secretion, tm, 30%, small ett secretion, 





Patient intubated on 3/23 , s/p trach 4/6,


Vitals





Vital Signs








  Date Time  Temp Pulse Resp B/P (MAP) Pulse Ox O2 Delivery O2 Flow Rate FiO2


 


5/24/20 11:16   22  99 Tracheal Collar 8.0 


 


5/24/20 08:00  92  96/54 (68)    


 


5/24/20 04:00 97.5       





 97.5       








ROS:  No Chest Pain, No Abdominal Pain, No Increase Cough


General:  Alert, No acute distress


HEENT:  Other (nc at perrl     neck trach site ok  no lad  no thyromegaly)


Lungs:  Wheezing, Other (decrease bs)


Cardiovascular:  S1, S2


Abdomen:  Soft, Non-tender, Other (distended)


Neuro Exam:  Alert


Extremities:  Other (+3 generalized edema )


Skin:  Warm, Dry


Labs





Laboratory Tests








Test


 5/22/20


18:00 5/23/20


00:29 5/23/20


06:06 5/23/20


17:53


 


Glucose (Fingerstick)


 155 mg/dL


(70-99) 144 mg/dL


(70-99) 163 mg/dL


(70-99) 140 mg/dL


(70-99)


 


Test


 5/23/20


23:53 5/24/20


05:46 5/24/20


11:25 





 


Glucose (Fingerstick)


 135 mg/dL


(70-99) 150 mg/dL


(70-99) 


 





 


White Blood Count


 


 


 16.0 x10^3/uL


(4.0-11.0) 





 


Red Blood Count


 


 


 2.94 x10^6/uL


(3.50-5.40) 





 


Hemoglobin


 


 


 8.5 g/dL


(12.0-15.5) 





 


Hematocrit


 


 


 25.5 %


(36.0-47.0) 





 


Mean Corpuscular Volume   87 fL ()  


 


Mean Corpuscular Hemoglobin   29 pg (25-35)  


 


Mean Corpuscular Hemoglobin


Concent 


 


 34 g/dL


(31-37) 





 


Red Cell Distribution Width


 


 


 19.0 %


(11.5-14.5) 





 


Platelet Count


 


 


 317 x10^3/uL


(140-400) 











Laboratory Tests








Test


 5/23/20


17:53 5/23/20


23:53 5/24/20


05:46 5/24/20


11:25


 


Glucose (Fingerstick)


 140 mg/dL


(70-99) 135 mg/dL


(70-99) 150 mg/dL


(70-99) 





 


White Blood Count


 


 


 


 16.0 x10^3/uL


(4.0-11.0)


 


Red Blood Count


 


 


 


 2.94 x10^6/uL


(3.50-5.40)


 


Hemoglobin


 


 


 


 8.5 g/dL


(12.0-15.5)


 


Hematocrit


 


 


 


 25.5 %


(36.0-47.0)


 


Mean Corpuscular Volume    87 fL () 


 


Mean Corpuscular Hemoglobin    29 pg (25-35) 


 


Mean Corpuscular Hemoglobin


Concent 


 


 


 34 g/dL


(31-37)


 


Red Cell Distribution Width


 


 


 


 19.0 %


(11.5-14.5)


 


Platelet Count


 


 


 


 317 x10^3/uL


(140-400)








Medications





Active Scripts








 Medications  Dose


 Route/Sig


 Max Daily Dose Days Date Category


 


 Bisoprolol


 Fumarate 5 Mg


 Tablet  10 Mg


 PO DAILY


   3/16/20 Reported








Comments


CXR  reviewed.





Impression


.


IMPRESSION:


1.  Acute hypoxemic respiratory failure secondary to ARDS status post trach,


2.  Gallstone pancreatitis


3.  Severe metabolic acidosis.stable


4.  Acute kidney injury-stable, Off HD-- continue to improve 


5.  Acute gallstone pancreatitis.


6.  Hypoalbuminemia.


7.  Moderate persistent effusions, s/p left thora  5/12


8.  Fever-  Per ID, per surgery--resolved 


9.  Chronic anemia


10. Covid 19 testing negative


11. Moderate to large ascites-S/P paracentisis


12.S/P paracentisis with 4 liters removed on 4/15/20


13. S/P IR drain placement on 5/8/2020





Plan


.


Continue current support


cont trach  pmv  capping as tolerated





Lasix monitor k, cr


s/p  thoracentesis, 5/12, 3 litres removed


cap trach as tolerated


Follow surgery recs-- S/P 3 drain placed in IR on 5/8/2020


Follow ID recs for ABX


Follow nephrology recs 


Continue TPN 


DVT/GI PPX: heparin SQ/ protonix 


D/W RN and RT, pt





CODE:FULL











MAN BLAKE MD               May 24, 2020 11:53

## 2020-05-25 VITALS — DIASTOLIC BLOOD PRESSURE: 80 MMHG | SYSTOLIC BLOOD PRESSURE: 133 MMHG

## 2020-05-25 VITALS — DIASTOLIC BLOOD PRESSURE: 70 MMHG | SYSTOLIC BLOOD PRESSURE: 126 MMHG

## 2020-05-25 VITALS — SYSTOLIC BLOOD PRESSURE: 127 MMHG | DIASTOLIC BLOOD PRESSURE: 84 MMHG

## 2020-05-25 VITALS — DIASTOLIC BLOOD PRESSURE: 60 MMHG | SYSTOLIC BLOOD PRESSURE: 111 MMHG

## 2020-05-25 VITALS — DIASTOLIC BLOOD PRESSURE: 69 MMHG | SYSTOLIC BLOOD PRESSURE: 117 MMHG

## 2020-05-25 VITALS — DIASTOLIC BLOOD PRESSURE: 80 MMHG | SYSTOLIC BLOOD PRESSURE: 141 MMHG

## 2020-05-25 VITALS — DIASTOLIC BLOOD PRESSURE: 43 MMHG | SYSTOLIC BLOOD PRESSURE: 100 MMHG

## 2020-05-25 VITALS — SYSTOLIC BLOOD PRESSURE: 119 MMHG | DIASTOLIC BLOOD PRESSURE: 60 MMHG

## 2020-05-25 VITALS — DIASTOLIC BLOOD PRESSURE: 70 MMHG | SYSTOLIC BLOOD PRESSURE: 122 MMHG

## 2020-05-25 VITALS — SYSTOLIC BLOOD PRESSURE: 132 MMHG | DIASTOLIC BLOOD PRESSURE: 82 MMHG

## 2020-05-25 VITALS — SYSTOLIC BLOOD PRESSURE: 109 MMHG | DIASTOLIC BLOOD PRESSURE: 64 MMHG

## 2020-05-25 VITALS — DIASTOLIC BLOOD PRESSURE: 56 MMHG | SYSTOLIC BLOOD PRESSURE: 102 MMHG

## 2020-05-25 VITALS — DIASTOLIC BLOOD PRESSURE: 77 MMHG | SYSTOLIC BLOOD PRESSURE: 126 MMHG

## 2020-05-25 VITALS — SYSTOLIC BLOOD PRESSURE: 90 MMHG | DIASTOLIC BLOOD PRESSURE: 53 MMHG

## 2020-05-25 VITALS — SYSTOLIC BLOOD PRESSURE: 103 MMHG | DIASTOLIC BLOOD PRESSURE: 66 MMHG

## 2020-05-25 VITALS — SYSTOLIC BLOOD PRESSURE: 144 MMHG | DIASTOLIC BLOOD PRESSURE: 82 MMHG

## 2020-05-25 VITALS — DIASTOLIC BLOOD PRESSURE: 94 MMHG | SYSTOLIC BLOOD PRESSURE: 122 MMHG

## 2020-05-25 VITALS — SYSTOLIC BLOOD PRESSURE: 94 MMHG | DIASTOLIC BLOOD PRESSURE: 54 MMHG

## 2020-05-25 VITALS — DIASTOLIC BLOOD PRESSURE: 66 MMHG | SYSTOLIC BLOOD PRESSURE: 130 MMHG

## 2020-05-25 VITALS — SYSTOLIC BLOOD PRESSURE: 109 MMHG | DIASTOLIC BLOOD PRESSURE: 58 MMHG

## 2020-05-25 VITALS — DIASTOLIC BLOOD PRESSURE: 64 MMHG | SYSTOLIC BLOOD PRESSURE: 110 MMHG

## 2020-05-25 VITALS — DIASTOLIC BLOOD PRESSURE: 64 MMHG | SYSTOLIC BLOOD PRESSURE: 111 MMHG

## 2020-05-25 VITALS — DIASTOLIC BLOOD PRESSURE: 56 MMHG | SYSTOLIC BLOOD PRESSURE: 97 MMHG

## 2020-05-25 VITALS — SYSTOLIC BLOOD PRESSURE: 108 MMHG | DIASTOLIC BLOOD PRESSURE: 68 MMHG

## 2020-05-25 LAB
ALBUMIN SERPL-MCNC: 2.3 G/DL (ref 3.4–5)
ALBUMIN/GLOB SERPL: 0.5 {RATIO} (ref 1–1.7)
ALP SERPL-CCNC: 119 U/L (ref 46–116)
ALT SERPL-CCNC: 25 U/L (ref 14–59)
ANION GAP SERPL CALC-SCNC: 4 MMOL/L (ref 6–14)
AST SERPL-CCNC: 26 U/L (ref 15–37)
BILIRUB SERPL-MCNC: 0.7 MG/DL (ref 0.2–1)
BUN SERPL-MCNC: 24 MG/DL (ref 7–20)
BUN/CREAT SERPL: 30 (ref 6–20)
CALCIUM SERPL-MCNC: 10 MG/DL (ref 8.5–10.1)
CHLORIDE SERPL-SCNC: 97 MMOL/L (ref 98–107)
CO2 SERPL-SCNC: 34 MMOL/L (ref 21–32)
CREAT SERPL-MCNC: 0.8 MG/DL (ref 0.6–1)
GFR SERPLBLD BASED ON 1.73 SQ M-ARVRAT: 76.2 ML/MIN
GLOBULIN SER-MCNC: 4.6 G/DL (ref 2.2–3.8)
GLUCOSE SERPL-MCNC: 140 MG/DL (ref 70–99)
MAGNESIUM SERPL-MCNC: 2.3 MG/DL (ref 1.8–2.4)
PHOSPHATE SERPL-MCNC: 5.2 MG/DL (ref 2.6–4.7)
POTASSIUM SERPL-SCNC: 5 MMOL/L (ref 3.5–5.1)
PROT SERPL-MCNC: 6.9 G/DL (ref 6.4–8.2)
SODIUM SERPL-SCNC: 135 MMOL/L (ref 136–145)
TRIGL SERPL-MCNC: 217 MG/DL (ref 0–150)

## 2020-05-25 RX ADMIN — PROCHLORPERAZINE EDISYLATE PRN MG: 5 INJECTION INTRAMUSCULAR; INTRAVENOUS at 23:32

## 2020-05-25 RX ADMIN — DEXMEDETOMIDINE HYDROCHLORIDE PRN MLS/HR: 100 INJECTION, SOLUTION, CONCENTRATE INTRAVENOUS at 11:10

## 2020-05-25 RX ADMIN — INSULIN LISPRO SCH UNITS: 100 INJECTION, SOLUTION INTRAVENOUS; SUBCUTANEOUS at 23:56

## 2020-05-25 RX ADMIN — PANTOPRAZOLE SODIUM SCH MG: 40 INJECTION, POWDER, FOR SOLUTION INTRAVENOUS at 08:49

## 2020-05-25 RX ADMIN — Medication PRN EACH: at 09:33

## 2020-05-25 RX ADMIN — FENTANYL CITRATE PRN MCG: 50 INJECTION INTRAMUSCULAR; INTRAVENOUS at 13:49

## 2020-05-25 RX ADMIN — INSULIN LISPRO SCH UNITS: 100 INJECTION, SOLUTION INTRAVENOUS; SUBCUTANEOUS at 06:00

## 2020-05-25 RX ADMIN — DEXMEDETOMIDINE HYDROCHLORIDE PRN MLS/HR: 100 INJECTION, SOLUTION, CONCENTRATE INTRAVENOUS at 20:00

## 2020-05-25 RX ADMIN — ONDANSETRON PRN MG: 2 INJECTION INTRAMUSCULAR; INTRAVENOUS at 20:19

## 2020-05-25 RX ADMIN — FENTANYL CITRATE PRN MCG: 50 INJECTION INTRAMUSCULAR; INTRAVENOUS at 09:45

## 2020-05-25 RX ADMIN — MEROPENEM SCH MLS/HR: 500 INJECTION, POWDER, FOR SOLUTION INTRAVENOUS at 17:48

## 2020-05-25 RX ADMIN — ONDANSETRON PRN MG: 2 INJECTION INTRAMUSCULAR; INTRAVENOUS at 01:36

## 2020-05-25 RX ADMIN — INSULIN LISPRO SCH UNITS: 100 INJECTION, SOLUTION INTRAVENOUS; SUBCUTANEOUS at 12:25

## 2020-05-25 RX ADMIN — FENTANYL CITRATE PRN MCG: 50 INJECTION INTRAMUSCULAR; INTRAVENOUS at 03:27

## 2020-05-25 RX ADMIN — FUROSEMIDE SCH MG: 10 INJECTION, SOLUTION INTRAMUSCULAR; INTRAVENOUS at 13:49

## 2020-05-25 RX ADMIN — BACITRACIN SCH MLS/HR: 5000 INJECTION, POWDER, FOR SOLUTION INTRAMUSCULAR at 13:47

## 2020-05-25 RX ADMIN — INSULIN LISPRO SCH UNITS: 100 INJECTION, SOLUTION INTRAVENOUS; SUBCUTANEOUS at 17:52

## 2020-05-25 RX ADMIN — FENTANYL CITRATE PRN MCG: 50 INJECTION INTRAMUSCULAR; INTRAVENOUS at 17:50

## 2020-05-25 RX ADMIN — ONDANSETRON PRN MG: 2 INJECTION INTRAMUSCULAR; INTRAVENOUS at 16:13

## 2020-05-25 RX ADMIN — MEROPENEM SCH MLS/HR: 500 INJECTION, POWDER, FOR SOLUTION INTRAVENOUS at 23:53

## 2020-05-25 RX ADMIN — ONDANSETRON PRN MG: 2 INJECTION INTRAMUSCULAR; INTRAVENOUS at 09:51

## 2020-05-25 RX ADMIN — ENOXAPARIN SODIUM SCH MG: 40 INJECTION SUBCUTANEOUS at 17:48

## 2020-05-25 RX ADMIN — FUROSEMIDE SCH MG: 10 INJECTION, SOLUTION INTRAMUSCULAR; INTRAVENOUS at 08:51

## 2020-05-25 RX ADMIN — DEXTROSE SCH MLS/HR: 50 INJECTION, SOLUTION INTRAVENOUS at 11:11

## 2020-05-25 RX ADMIN — FENTANYL CITRATE PRN MCG: 50 INJECTION INTRAMUSCULAR; INTRAVENOUS at 22:21

## 2020-05-25 NOTE — PDOC
Infectious Disease Note


Subjective:


Subjective


Trach shield, 


No fevers last 24 hrs





Vital Signs:


Vital Signs





Vital Signs








  Date Time  Temp Pulse Resp B/P (MAP) Pulse Ox O2 Delivery O2 Flow Rate FiO2


 


5/25/20 07:40  107 22 111/64 (80) 95 Tracheal Collar 8.0 


 


5/25/20 04:00 99.4       





 99.4       











Physical Exam:


PHYSICAL EXAM


GENERAL: Propped up in bed, resting quietly 


HEENT:  Oral cavity clear,  NGT


NECK:  Trach shield


LUNGS: Clear anteriorly, no accessory muscle use 


HEART:  S1, S2, regular 


ABDOMEN: mod distention, hypoactive BS, tender, + drains x 3


: [place] (4/14)


EXTREMITIES: Generalized edema, improving, no cyanosis, SCDs bilaterally 


SKIN: Warm and dry.  No generalized rash.  


CNS: Very weak 


RUE-PICC (4/30) clean





Medications:


Inpatient Meds:





Current Medications








 Medications


  (Trade)  Dose


 Ordered  Sig/Yee  Start Time


 Stop Time Status Last Admin


Dose Admin


 


 Acetaminophen


  (Tylenol Supp)  650 mg  PRN Q6HRS  PRN  3/24/20 10:30


    5/5/20 09:12


650 MG


 


 Acetaminophen


  (Tylenol)  650 mg  PRN Q6HRS  PRN  3/21/20 03:36


 5/13/20 10:25 DC 4/16/20 19:56


650 MG


 


 Albumin Human  100 ml @ 


 100 mls/hr  1X PRN  PRN  5/19/20 01:30


     





 


 Albuterol Sulfate


  (Ventolin Neb


 Soln)  2.5 mg  1X  ONCE  3/17/20 22:30


 3/17/20 22:31 DC 3/18/20 00:56


2.5 MG


 


 Alteplase,


 Recombinant


  (Cathflo For


 Central Catheter


 Clearance)  1 mg  1X  ONCE  4/24/20 10:45


 4/24/20 10:46 DC 4/24/20 11:44


1 MG


 


 Amino Acids/


 Glycerin/


 Electrolytes  1,000 ml @ 


 75 mls/hr  [id number]  4/20/20 21:15


   UNV  





 


 Artificial Tears


  (Artificial


 Tears)  1 drop  PRN Q15MIN  PRN  4/29/20 05:30


    5/24/20 11:15


1 DROP


 


 Atenolol


  (Tenormin)  100 mg  DAILY  3/17/20 09:00


 3/16/20 20:08 DC  





 


 Atropine Sulfate


  (ATROPINE 0.5mg


 SYRINGE)  0.5 mg  PRN Q5MIN  PRN  4/2/20 08:15


     





 


 Benzocaine


  (Hurricaine One)  1 spray  1X  ONCE  3/20/20 14:30


 3/20/20 14:31 DC 3/20/20 16:38


1 SPRAY


 


 Bisacodyl


  (Dulcolax Supp)  10 mg  STK-MED ONCE  4/27/20 10:59


 4/27/20 10:59 DC  





 


 Bumetanide


  (Bumex)  2 mg  DAILY  5/8/20 10:00


 5/18/20 17:15 DC 5/18/20 08:07


2 MG


 


 Bupivacaine HCl/


 Epinephrine Bitart


  (Sensorcain-Epi


 0.5%-1:079467 Mpf)  30 ml  STK-MED ONCE  4/27/20 10:58


 4/27/20 10:58 DC 4/27/20 12:01


7 ML


 


 Calcium Carbonate/


 Glycine


  (Tums)  500 mg  PRN [id number]  PRN  3/18/20 17:45


 5/13/20 10:25 DC  





 


 Calcium Chloride


 1000 mg/Sodium


 Chloride  110 ml @ 


 220 mls/hr  1X  ONCE  3/17/20 22:30


 3/17/20 22:59 DC 3/17/20 22:11


220 MLS/HR


 


 Calcium Chloride


 3000 mg/Sodium


 Chloride  1,030 ml @ 


 50 mls/hr  [id number]  3/19/20 08:00


 3/21/20 15:23 DC 3/21/20 02:17


50 MLS/HR


 


 Calcium Gluconate


  (Calcium


 Gluconate)  2,000 mg  1X  ONCE  3/19/20 02:15


 3/19/20 02:16 DC 3/19/20 02:19


2,000 MG


 


 Calcium Gluconate


 1000 mg/Sodium


 Chloride  110 ml @ 


 220 mls/hr  1X  ONCE  3/18/20 03:30


 3/18/20 03:59 DC 3/18/20 03:21


220 MLS/HR


 


 Calcium Gluconate


 2000 mg/Sodium


 Chloride  120 ml @ 


 220 mls/hr  1X  ONCE  3/18/20 07:30


 3/18/20 08:02 DC 3/18/20 09:05


220 MLS/HR


 


 Cefepime HCl


  (Maxipime)  2 gm  Q12HR  3/25/20 09:00


 4/8/20 09:58 DC 4/7/20 20:56


2 GM


 


 Cellulose


  (Surgicel


 Fibrillar 1x2)  1 each  STK-MED ONCE  4/6/20 11:00


 4/6/20 11:01 DC  





 


 Cellulose


  (Surgicel


 Hemostat 2x14)  1 each  STK-MED ONCE  4/27/20 10:58


 4/27/20 10:59 DC  





 


 Cellulose


  (Surgicel


 Hemostat 4x8)  1 each  STK-MED ONCE  4/27/20 10:58


 4/27/20 10:59 DC  





 


 Cyclobenzaprine


 HCl


  (Flexeril)  10 mg  PRN Q6HRS  PRN  4/30/20 10:45


     





 


 Daptomycin 430 mg/


 Sodium Chloride  50 ml @ 


 100 mls/hr  Q24H  4/25/20 13:00


 4/30/20 20:58 DC 4/30/20 13:00


100 MLS/HR


 


 Daptomycin 450 mg/


 Sodium Chloride  50 ml @ 


 100 mls/hr  Q24H  5/17/20 09:00


 5/21/20 08:30 DC 5/20/20 09:25


100 MLS/HR


 


 Daptomycin 485 mg/


 Sodium Chloride  50 ml @ 


 100 mls/hr  Q24H  5/4/20 11:00


 5/12/20 07:44 DC 5/11/20 13:10


100 MLS/HR


 


 Daptomycin 500 mg/


 Sodium Chloride  50 ml @ 


 100 mls/hr  Q48H  3/25/20 08:30


 4/10/20 10:07 DC 4/10/20 09:57


100 MLS/HR


 


 Dexamethasone


 Sodium Phosphate


  (Decadron)  4 mg  STK-MED ONCE  4/27/20 10:56


 4/27/20 10:57 DC  





 


 Dexmedetomidine


 HCl 400 mcg/


 Sodium Chloride  100 ml @ 0


 mls/hr  CONT  PRN  4/2/20 08:15


    5/24/20 23:35


5.4 MLS/HR


 


 Dextrose


  (Dextrose


 50%-Water Syringe)  12.5 gm  PRN Q15MIN  PRN  3/16/20 09:30


     





 


 Digoxin


  (Lanoxin)  125 mcg  1X  ONCE  3/19/20 18:00


 3/19/20 18:01 DC 3/19/20 17:10


125 MCG


 


 Diphenhydramine


 HCl


  (Benadryl)  25 mg  1X PRN  PRN  4/24/20 15:45


 4/25/20 15:44 DC  





 


 Duloxetine HCl


  (Cymbalta)  30 mg  DAILY  5/10/20 14:00


 5/13/20 10:25 DC 5/11/20 09:48


30 MG


 


 Enoxaparin Sodium


  (Lovenox 100mg


 Syringe)  100 mg  Q12HR  4/21/20 21:00


   UNV  





 


 Enoxaparin Sodium


  (Lovenox 40mg


 Syringe)  40 mg  Q24H  5/17/20 17:00


    5/24/20 16:42


40 MG


 


 Etomidate


  (Amidate)  8 mg  1X  ONCE  3/23/20 08:30


 3/23/20 08:31 DC 3/23/20 08:33


8 MG


 


 Fentanyl Citrate


  (Fentanyl 2ml


 Vial)  25 mcg  PRN Q4HRS  PRN  5/18/20 13:15


    5/24/20 06:36


25 MCG


 


 Fentanyl Citrate


  (Fentanyl 5ml


 Vial)  250 mcg  1X  ONCE  5/8/20 09:15


 5/8/20 09:16 DC 5/8/20 09:30


50 MCG


 


 Furosemide


  (Lasix)  40 mg  [id number]  5/19/20 14:00


    5/24/20 16:41


40 MG


 


 Haloperidol


 Lactate


  (Haldol Inj)  3 mg  1X  ONCE  5/4/20 14:30


 5/4/20 14:31 DC 5/4/20 14:37


3 MG


 


 Heparin Sodium


  (Porcine)


  (Hep Lock Adult)  500 unit  STK-MED ONCE  4/7/20 09:29


 4/7/20 09:30 DC  





 


 Heparin Sodium


  (Porcine)


  (Heparin Sodium)  5,000 unit  Q12HR  4/27/20 21:00


 5/7/20 09:59 DC 5/6/20 20:57


5,000 UNIT


 


 Heparin Sodium


  (Porcine) 1000


 unit/Sodium


 Chloride  1,001 ml @ 


 1,001 mls/hr  1X  ONCE  4/27/20 12:00


 4/27/20 12:59 DC  





 


 Hydromorphone HCl


  (Dilaudid


 Standard PCA)  12 mg  STK-MED ONCE  5/1/20 15:50


 5/12/20 11:24 DC  





 


 Hydromorphone HCl


  (Dilaudid)  1 mg  PRN Q4HRS  PRN  5/4/20 19:00


 5/18/20 17:10 DC 5/18/20 06:25


1 MG


 


 Info


  (CONTRAST GIVEN


 -- Rx MONITORING)  1 each  PRN DAILY  PRN  3/30/20 11:45


 4/1/20 11:44 DC  





 


 Info


  (Icu Electrolyte


 Protocol)  1 ea  CONT PRN  PRN  3/29/20 13:15


     





 


 Info


  (PHARMACY


 MONITORING -- do


 not chart)  1 each  PRN DAILY  PRN  4/24/20 15:45


     





 


 Info


  (Tpn Per


 Pharmacy)  1 each  PRN DAILY  PRN  3/18/20 12:30


   UNV  





 


 Insulin Human


 Lispro


  (HumaLOG)  0-9 UNITS  Q6HRS  3/16/20 09:30


    5/24/20 23:42


4 UNITS


 


 Insulin Human


 Regular


  (HumuLIN R VIAL)  5 unit  1X  ONCE  3/17/20 22:30


 3/17/20 22:31 DC 3/17/20 22:14


5 UNIT


 


 Iohexol


  (Omnipaque 240


 Mg/ml)  30 ml  1X  ONCE  3/30/20 11:30


 3/30/20 11:33 DC 3/30/20 11:30


30 ML


 


 Iohexol


  (Omnipaque 300


 Mg/ml)  50 ml  STK-MED ONCE  4/27/20 10:58


 4/27/20 10:59 DC  





 


 Iohexol


  (Omnipaque 350


 Mg/ml)  90 ml  1X  ONCE  3/16/20 03:30


 3/16/20 03:31 DC 3/16/20 03:25


90 ML


 


 Ketorolac


 Tromethamine


  (Toradol 30mg


 Vial)  30 mg  1X  ONCE  3/16/20 03:00


 3/16/20 03:01 DC 3/16/20 02:54


30 MG


 


 Lidocaine HCl


  (Buffered


 Lidocaine 1%)  6 ml  1X  ONCE  5/12/20 14:15


 5/12/20 14:16 DC 5/12/20 14:15


3 ML


 


 Lidocaine HCl


  (Glydo


  (Lidocaine) Jelly)  1 thomas  1X  ONCE  3/20/20 14:30


 3/20/20 14:31 DC 3/20/20 16:38


1 THOMAS


 


 Lidocaine HCl


  (Xylocaine-Mpf


 1% 2ml Vial)  2 ml  PRN 1X  PRN  4/27/20 07:00


 4/28/20 06:59 DC  





 


 Linezolid/Dextrose  300 ml @ 


 300 mls/hr  Q12HR  5/17/20 09:00


 5/20/20 08:11 DC 5/19/20 21:08


300 MLS/HR


 


 Lorazepam


  (Ativan Inj)  2 mg  PRN Q4HRS  PRN  5/17/20 19:15


    5/18/20 22:20


2 MG


 


 Magnesium Sulfate  50 ml @ 25


 mls/hr  1X  ONCE  5/10/20 09:00


 5/10/20 10:59 DC 5/10/20 10:44


25 MLS/HR


 


 Meropenem 1 gm/


 Sodium Chloride  100 ml @ 


 200 mls/hr  Q8HRS  4/28/20 14:00


 5/22/20 09:31 DC 5/22/20 05:53


200 MLS/HR


 


 Meropenem 500 mg/


 Sodium Chloride  50 ml @ 


 100 mls/hr  Q12H  4/8/20 10:00


 4/28/20 12:37 DC 4/28/20 10:45


100 MLS/HR


 


 Metoclopramide HCl


  (Reglan Vial)  10 mg  PRN Q3HRS  PRN  5/9/20 16:45


    5/14/20 04:25


10 MG


 


 Metoprolol


 Tartrate


  (Lopressor Vial)  5 mg  1X  ONCE  5/17/20 15:15


 5/17/20 15:16 DC 5/17/20 15:31


5 MG


 


 Metronidazole  100 ml @ 


 100 mls/hr  Q8HRS  4/14/20 10:00


 4/21/20 08:10 DC 4/21/20 06:04


100 MLS/HR


 


 Micafungin Sodium


 100 mg/Dextrose  100 ml @ 


 100 mls/hr  Q24H  5/4/20 11:00


    5/24/20 12:58


100 MLS/HR


 


 Midazolam HCl


  (Versed)  5 mg  1X  ONCE  5/8/20 09:15


 5/8/20 09:16 DC 5/8/20 09:30


1 MG


 


 Midazolam HCl 100


 mg/Sodium Chloride  100 ml @ 7


 mls/hr  CONT  PRN  3/28/20 16:00


    4/8/20 15:35


7 MLS/HR


 


 Midazolam HCl 50


 mg/Sodium Chloride  50 ml @ 0


 mls/hr  CONT  PRN  3/23/20 08:15


 3/28/20 15:59 DC 3/26/20 22:39


7 MLS/HR


 


 Morphine Sulfate


  (Morphine


 Sulfate)  2 mg  PRN Q2HR  PRN  3/16/20 05:00


 3/17/20 14:15 DC 3/17/20 12:26


2 MG


 


 Multi-Ingred


 Cream/Lotion/Oil/


 Oint


  (Artificial


 Tears Eye


 Ointment)  1 thomas  PRN Q1HR  PRN  3/25/20 17:30


    4/13/20 08:19


1 THOMAS


 


 Naloxone HCl


  (Narcan)  0.4 mg  PRN Q2MIN  PRN  4/27/20 14:30


   UNV  





 


 Norepinephrine


 Bitartrate 8 mg/


 Dextrose  258 ml @ 


 17.299 mls/


 hr  CONT  PRN  3/17/20 15:30


 4/17/20 09:19 DC 4/14/20 12:48


20.9 MLS/HR


 


 Ondansetron HCl


  (Zofran)  4 mg  STK-MED ONCE  4/27/20 10:56


 4/27/20 10:57 DC  





 


 Pantoprazole


 Sodium


  (PROTONIX VIAL


 for IV PUSH)  40 mg  DAILYAC  3/16/20 11:30


    5/24/20 11:15


40 MG


 


 Phenylephrine HCl


  (PHENYLEPHRINE


 in 0.9% NACL PF)  1 mg  STK-MED ONCE  4/27/20 12:34


 4/27/20 12:34 DC  





 


 Piperacillin Sod/


 Tazobactam Sod


 4.5 gm/Sodium


 Chloride  100 ml @ 


 200 mls/hr  1X  ONCE  3/16/20 06:00


 3/16/20 06:29 DC 3/16/20 05:44


200 MLS/HR


 


 Potassium


 Chloride 110 meq/


 Magnesium Sulfate


 20 meq/


 Multivitamins 10


 ml/Chromium/


 Copper/Manganese/


 Seleni/Zn 1 ml/


 Insulin Human


 Regular 15 unit/


 Total Parenteral


 Nutrition/Amino


 Acids/Dextrose/


 Fat Emulsion


 Intravenous  1,800 ml @ 


 75 mls/hr  TPN  CONT  5/24/20 22:00


 5/25/20 21:59  5/24/20 22:48


75 MLS/HR


 


 Potassium


 Chloride 15 meq/


 Bicarbonate


 Dialysis Soln w/


 out KCl  5,007.5 ml


  @ 1,000 mls/


 hr  Q5H1M  3/29/20 20:00


 4/2/20 13:08 DC 4/1/20 18:14


1,000 MLS/HR


 


 Potassium


 Chloride 20 meq/


 Bicarbonate


 Dialysis Soln w/


 out KCl  5,010 ml @ 


 1,000 mls/hr  Q5H1M  3/25/20 16:00


 3/29/20 19:59 DC 3/29/20 14:54


1,000 MLS/HR


 


 Potassium


 Chloride 75 meq/


 Magnesium Sulfate


 15 meq/


 Multivitamins 10


 ml/Chromium/


 Copper/Manganese/


 Seleni/Zn 0.5 ml/


 Insulin Human


 Regular 15 unit/


 Total Parenteral


 Nutrition/Amino


 Acids/Dextrose/


 Fat Emulsion


 Intravenous  1,920 ml @ 


 80 mls/hr  TPN  CONT  5/9/20 22:00


 5/10/20 21:59 DC 5/9/20 22:41


80 MLS/HR


 


 Potassium


 Chloride 75 meq/


 Magnesium Sulfate


 15 meq/Calcium


 Gluconate 8 meq/


 Multivitamins 10


 ml/Chromium/


 Copper/Manganese/


 Seleni/Zn 0.5 ml/


 Insulin Human


 Regular 15 unit/


 Total Parenteral


 Nutrition/Amino


 Acids/Dextrose/


 Fat Emulsion


 Intravenous  1,920 ml @ 


 80 mls/hr  TPN  CONT  5/7/20 22:00


 5/8/20 21:59 DC 5/7/20 22:28


80 MLS/HR


 


 Potassium


 Chloride 75 meq/


 Magnesium Sulfate


 15 meq/Calcium


 Gluconate 8 meq/


 Multivitamins 10


 ml/Chromium/


 Copper/Manganese/


 Seleni/Zn 0.5 ml/


 Insulin Human


 Regular 20 unit/


 Total Parenteral


 Nutrition/Amino


 Acids/Dextrose/


 Fat Emulsion


 Intravenous  1,920 ml @ 


 80 mls/hr  TPN  CONT  5/6/20 22:00


 5/7/20 21:59 DC 5/6/20 22:00


80 MLS/HR


 


 Potassium


 Chloride 75 meq/


 Magnesium Sulfate


 15 meq/Calcium


 Gluconate 8 meq/


 Multivitamins 10


 ml/Chromium/


 Copper/Manganese/


 Seleni/Zn 0.5 ml/


 Insulin Human


 Regular 25 unit/


 Total Parenteral


 Nutrition/Amino


 Acids/Dextrose/


 Fat Emulsion


 Intravenous  1,920 ml @ 


 80 mls/hr  TPN  CONT  5/4/20 22:00


 5/5/20 21:59 DC 5/4/20 23:08


80 MLS/HR


 


 Potassium


 Chloride 75 meq/


 Magnesium Sulfate


 20 meq/Calcium


 Gluconate 10 meq/


 Multivitamins 10


 ml/Chromium/


 Copper/Manganese/


 Seleni/Zn 0.5 ml/


 Insulin Human


 Regular 25 unit/


 Total Parenteral


 Nutrition/Amino


 Acids/Dextrose/


 Fat Emulsion


 Intravenous  1,920 ml @ 


 80 mls/hr  TPN  CONT  5/3/20 22:00


 5/4/20 21:59 DC 5/3/20 22:04


80 MLS/HR


 


 Potassium


 Chloride 75 meq/


 Magnesium Sulfate


 20 meq/Calcium


 Gluconate 10 meq/


 Multivitamins 10


 ml/Chromium/


 Copper/Manganese/


 Seleni/Zn 0.5 ml/


 Insulin Human


 Regular 30 unit/


 Total Parenteral


 Nutrition/Amino


 Acids/Dextrose/


 Fat Emulsion


 Intravenous  1,920 ml @ 


 80 mls/hr  TPN  CONT  5/2/20 22:00


 5/3/20 22:00 DC 5/2/20 21:51


80 MLS/HR


 


 Potassium


 Chloride 80 meq/


 Magnesium Sulfate


 20 meq/


 Multivitamins 10


 ml/Chromium/


 Copper/Manganese/


 Seleni/Zn 0.5 ml/


 Insulin Human


 Regular 15 unit/


 Total Parenteral


 Nutrition/Amino


 Acids/Dextrose/


 Fat Emulsion


 Intravenous  1,920 ml @ 


 80 mls/hr  TPN  CONT  5/12/20 22:00


 5/13/20 21:59 DC 5/12/20 21:40


80 MLS/HR


 


 Potassium


 Chloride 80 meq/


 Magnesium Sulfate


 20 meq/


 Multivitamins 10


 ml/Chromium/


 Copper/Manganese/


 Seleni/Zn 1 ml/


 Insulin Human


 Regular 15 unit/


 Total Parenteral


 Nutrition/Amino


 Acids/Dextrose/


 Fat Emulsion


 Intravenous  1,920 ml @ 


 80 mls/hr  TPN  CONT  5/13/20 22:00


 5/14/20 21:59 DC 5/13/20 22:04


80 MLS/HR


 


 Potassium


 Chloride 90 meq/


 Magnesium Sulfate


 20 meq/


 Multivitamins 10


 ml/Chromium/


 Copper/Manganese/


 Seleni/Zn 1 ml/


 Insulin Human


 Regular 15 unit/


 Total Parenteral


 Nutrition/Amino


 Acids/Dextrose/


 Fat Emulsion


 Intravenous  1,800 ml @ 


 75 mls/hr  TPN  CONT  5/20/20 22:00


 5/21/20 21:59 DC 5/20/20 22:28


75 MLS/HR


 


 Potassium


 Chloride/Water  100 ml @ 


 100 mls/hr  1X  ONCE  5/14/20 11:00


 5/14/20 11:59 DC 5/14/20 11:34


100 MLS/HR


 


 Potassium


 Phosphate 20 mmol/


 Sodium Chloride  106.6667


 ml @ 


 51.667 m...  1X  ONCE  3/25/20 13:00


 3/25/20 15:03 DC 3/25/20 12:51


51.667 MLS/HR


 


 Potassium Acetate


 30 meq/Magnesium


 Sulfate 20 meq/


 Calcium Gluconate


 10 meq/


 Multivitamins 10


 ml/Chromium/


 Copper/Manganese/


 Seleni/Zn 0.5 ml/


 Insulin Human


 Regular 30 unit/


 Potassium


 Chloride 30 meq/


 Total Parenteral


 Nutrition/Amino


 Acids/Dextrose/


 Fat Emulsion


 Intravenous  1,920 ml @ 


 80 mls/hr  TPN  CONT  5/1/20 22:00


 5/2/20 21:59 DC 5/1/20 22:34


80 MLS/HR


 


 Potassium Acetate


 55 meq/Magnesium


 Sulfate 20 meq/


 Calcium Gluconate


 10 meq/


 Multivitamins 10


 ml/Chromium/


 Copper/Manganese/


 Seleni/Zn 0.5 ml/


 Insulin Human


 Regular 30 unit/


 Total Parenteral


 Nutrition/Amino


 Acids/Dextrose/


 Fat Emulsion


 Intravenous  1,920 ml @ 


 80 mls/hr  TPN  CONT  4/30/20 22:00


 5/1/20 21:59 DC 5/1/20 01:00


80 MLS/HR


 


 Potassium Acetate


 55 meq/Magnesium


 Sulfate 20 meq/


 Calcium Gluconate


 10 meq/


 Multivitamins 10


 ml/Chromium/


 Copper/Manganese/


 Seleni/Zn 0.5 ml/


 Insulin Human


 Regular 35 unit/


 Total Parenteral


 Nutrition/Amino


 Acids/Dextrose/


 Fat Emulsion


 Intravenous  1,920 ml @ 


 80 mls/hr  TPN  CONT  4/28/20 22:00


 4/29/20 21:59 DC 4/28/20 22:02


80 MLS/HR


 


 Potassium Acetate


 65 meq/Magnesium


 Sulfate 20 meq/


 Calcium Gluconate


 10 meq/


 Multivitamins 10


 ml/Chromium/


 Copper/Manganese/


 Seleni/Zn 0.5 ml/


 Insulin Human


 Regular 30 unit/


 Total Parenteral


 Nutrition/Amino


 Acids/Dextrose/


 Fat Emulsion


 Intravenous  1,920 ml @ 


 80 mls/hr  TPN  CONT  4/29/20 22:00


 4/30/20 21:59 DC 4/29/20 22:22


80 MLS/HR


 


 Prochlorperazine


 Edisylate


  (Compazine)  5 mg  PACU PRN  PRN  4/27/20 07:00


 4/28/20 06:59 DC  





 


 Propofol  20 ml @ As


 Directed  STK-MED ONCE  4/27/20 12:26


 4/27/20 12:27 DC  





 


 Ringer's Solution  1,000 ml @ 


 30 mls/hr  Q24H  4/27/20 07:00


 4/27/20 18:59 DC  





 


 Rocuronium Bromide


  (Zemuron)  50 mg  STK-MED ONCE  4/27/20 10:56


 4/27/20 10:57 DC  





 


 Saliva Substitute


  (Biotene


 Moisturizing


 Mouth)  2 spray  PRN Q15MIN  PRN  5/21/20 11:00


     





 


 Sevoflurane


  (Ultane)  60 ml  STK-MED ONCE  4/27/20 12:26


 4/27/20 12:27 DC  





 


 Sodium


 Bicarbonate 50


 meq/Sodium


 Chloride  1,050 ml @ 


 75 mls/hr  Q14H  3/18/20 07:30


 3/23/20 10:28 DC 3/22/20 21:10


75 MLS/HR


 


 Sodium Acetate 50


 meq/Potassium


 Acetate 55 meq/


 Magnesium Sulfate


 20 meq/Calcium


 Gluconate 10 meq/


 Multivitamins 10


 ml/Chromium/


 Copper/Manganese/


 Seleni/Zn 0.5 ml/


 Insulin Human


 Regular 35 unit/


 Total Parenteral


 Nutrition/Amino


 Acids/Dextrose/


 Fat Emulsion


 Intravenous  1,800 ml @ 


 75 mls/hr  TPN  CONT  4/25/20 22:00


 4/26/20 21:59 DC 4/25/20 22:03


75 MLS/HR


 


 Sodium Chloride  1,000 ml @ 


 25 mls/hr  Q24H  4/27/20 14:30


   UNV  





 


 Sodium Chloride


  (Normal Saline


 Flush)  3 ml  QSHIFT  PRN  4/27/20 13:45


     





 


 Sodium Chloride


 90 meq/Calcium


 Gluconate 10 meq/


 Multivitamins 10


 ml/Chromium/


 Copper/Manganese/


 Seleni/Zn 0.5 ml/


 Total Parenteral


 Nutrition/Amino


 Acids/Dextrose/


 Fat Emulsion


 Intravenous  1,512 ml @ 


 63 mls/hr  TPN  CONT  3/18/20 22:00


 3/19/20 21:59 DC 3/18/20 22:06


63 MLS/HR


 


 Sodium Chloride


 90 meq/Calcium


 Gluconate 10 meq/


 Multivitamins 10


 ml/Chromium/


 Copper/Manganese/


 Seleni/Zn 1 ml/


 Total Parenteral


 Nutrition/Amino


 Acids/Dextrose/


 Fat Emulsion


 Intravenous  55.005 ml


  @ 2.292


 mls/hr  TPN  CONT  3/18/20 22:00


 3/18/20 12:33 DC  





 


 Sodium Chloride


 90 meq/Magnesium


 Sulfate 10 meq/


 Calcium Gluconate


 20 meq/


 Multivitamins 10


 ml/Chromium/


 Copper/Manganese/


 Seleni/Zn 0.5 ml/


 Total Parenteral


 Nutrition/Amino


 Acids/Dextrose/


 Fat Emulsion


 Intravenous  1,512 ml @ 


 63 mls/hr  TPN  CONT  3/19/20 22:00


 3/20/20 21:59 DC 3/19/20 22:25


63 MLS/HR


 


 Sodium Chloride


 90 meq/Magnesium


 Sulfate 12 meq/


 Calcium Gluconate


 15 meq/


 Multivitamins 10


 ml/Chromium/


 Copper/Manganese/


 Seleni/Zn 0.5 ml/


 Insulin Human


 Regular 25 unit/


 Total Parenteral


 Nutrition/Amino


 Acids/Dextrose/


 Fat Emulsion


 Intravenous  1,400 ml @ 


 58.333 mls/


 hr  TPN  CONT  4/8/20 22:00


 4/9/20 21:59 DC 4/8/20 21:41


58.333 MLS/HR


 


 Sodium Chloride


 90 meq/Potassium


 Chloride 15 meq/


 Magnesium Sulfate


 12 meq/Calcium


 Gluconate 15 meq/


 Multivitamins 10


 ml/Chromium/


 Copper/Manganese/


 Seleni/Zn 0.5 ml/


 Insulin Human


 Regular 25 unit/


 Total Parenteral


 Nutrition/Amino


 Acids/Dextrose/


 Fat Emulsion


 Intravenous  1,400 ml @ 


 58.333 mls/


 hr  TPN  CONT  4/7/20 22:00


 4/8/20 21:59 DC 4/7/20 22:13


58.333 MLS/HR


 


 Sodium Chloride


 90 meq/Potassium


 Chloride 15 meq/


 Potassium


 Phosphate 10 mmol/


 Magnesium Sulfate


 8 meq/Calcium


 Gluconate 15 meq/


 Multivitamins 10


 ml/Chromium/


 Copper/Manganese/


 Seleni/Zn 0.5 ml/


 Insulin Human


 Regular 25 unit/


 Total Parenteral


 Nutrition/Amino


 Acids/Dextrose/


 Fat Emulsion


 Intravenous  1,400 ml @ 


 58.333 mls/


 hr  TPN  CONT  4/5/20 22:00


 4/6/20 21:59 DC 4/5/20 21:20


58.333 MLS/HR


 


 Sodium Chloride


 90 meq/Potassium


 Chloride 15 meq/


 Potassium


 Phosphate 10 mmol/


 Magnesium Sulfate


 10 meq/Calcium


 Gluconate 20 meq/


 Multivitamins 10


 ml/Chromium/


 Copper/Manganese/


 Seleni/Zn 0.5 ml/


 Total Parenteral


 Nutrition/Amino


 Acids/Dextrose/


 Fat Emulsion


 Intravenous  1,400 ml @ 


 58.333 mls/


 hr  TPN  CONT  3/23/20 22:00


 3/24/20 21:59 DC 3/23/20 21:42


58.333 MLS/HR


 


 Sodium Chloride


 90 meq/Potassium


 Chloride 15 meq/


 Potassium


 Phosphate 10 mmol/


 Magnesium Sulfate


 12 meq/Calcium


 Gluconate 15 meq/


 Multivitamins 10


 ml/Chromium/


 Copper/Manganese/


 Seleni/Zn 0.5 ml/


 Insulin Human


 Regular 25 unit/


 Total Parenteral


 Nutrition/Amino


 Acids/Dextrose/


 Fat Emulsion


 Intravenous  1,400 ml @ 


 58.333 mls/


 hr  TPN  CONT  4/6/20 22:00


 4/7/20 21:59 DC 4/6/20 22:24


58.333 MLS/HR


 


 Sodium Chloride


 90 meq/Potassium


 Chloride 15 meq/


 Potassium


 Phosphate 15 mmol/


 Magnesium Sulfate


 10 meq/Calcium


 Gluconate 15 meq/


 Multivitamins 10


 ml/Chromium/


 Copper/Manganese/


 Seleni/Zn 0.5 ml/


 Total Parenteral


 Nutrition/Amino


 Acids/Dextrose/


 Fat Emulsion


 Intravenous  1,400 ml @ 


 58.333 mls/


 hr  TPN  CONT  3/24/20 22:00


 3/25/20 21:59 DC 3/24/20 22:17


58.333 MLS/HR


 


 Sodium Chloride


 90 meq/Potassium


 Chloride 15 meq/


 Potassium


 Phosphate 15 mmol/


 Magnesium Sulfate


 10 meq/Calcium


 Gluconate 20 meq/


 Multivitamins 10


 ml/Chromium/


 Copper/Manganese/


 Seleni/Zn 0.5 ml/


 Total Parenteral


 Nutrition/Amino


 Acids/Dextrose/


 Fat Emulsion


 Intravenous  1,200 ml @ 


 50 mls/hr  TPN  CONT  3/22/20 22:00


 3/22/20 14:17 DC  





 


 Sodium Chloride


 90 meq/Potassium


 Chloride 15 meq/


 Potassium


 Phosphate 18 mmol/


 Magnesium Sulfate


 8 meq/Calcium


 Gluconate 15 meq/


 Multivitamins 10


 ml/Chromium/


 Copper/Manganese/


 Seleni/Zn 0.5 ml/


 Insulin Human


 Regular 10 unit/


 Total Parenteral


 Nutrition/Amino


 Acids/Dextrose/


 Fat Emulsion


 Intravenous  1,400 ml @ 


 58.333 mls/


 hr  TPN  CONT  3/27/20 22:00


 3/28/20 21:59 DC 3/27/20 21:43


58.333 MLS/HR


 


 Sodium Chloride


 90 meq/Potassium


 Chloride 15 meq/


 Potassium


 Phosphate 18 mmol/


 Magnesium Sulfate


 8 meq/Calcium


 Gluconate 15 meq/


 Multivitamins 10


 ml/Chromium/


 Copper/Manganese/


 Seleni/Zn 0.5 ml/


 Insulin Human


 Regular 15 unit/


 Total Parenteral


 Nutrition/Amino


 Acids/Dextrose/


 Fat Emulsion


 Intravenous  1,400 ml @ 


 58.333 mls/


 hr  TPN  CONT  3/30/20 22:00


 3/31/20 21:59 DC 3/30/20 21:47


58.333 MLS/HR


 


 Sodium Chloride


 90 meq/Potassium


 Chloride 15 meq/


 Potassium


 Phosphate 18 mmol/


 Magnesium Sulfate


 8 meq/Calcium


 Gluconate 15 meq/


 Multivitamins 10


 ml/Chromium/


 Copper/Manganese/


 Seleni/Zn 0.5 ml/


 Insulin Human


 Regular 20 unit/


 Total Parenteral


 Nutrition/Amino


 Acids/Dextrose/


 Fat Emulsion


 Intravenous  1,400 ml @ 


 58.333 mls/


 hr  TPN  CONT  4/2/20 22:00


 4/3/20 21:59 DC 4/2/20 22:45


58.333 MLS/HR


 


 Sodium Chloride


 90 meq/Potassium


 Chloride 15 meq/


 Potassium


 Phosphate 18 mmol/


 Magnesium Sulfate


 8 meq/Calcium


 Gluconate 15 meq/


 Multivitamins 10


 ml/Chromium/


 Copper/Manganese/


 Seleni/Zn 0.5 ml/


 Total Parenteral


 Nutrition/Amino


 Acids/Dextrose/


 Fat Emulsion


 Intravenous  1,400 ml @ 


 58.333 mls/


 hr  TPN  CONT  3/26/20 22:00


 3/27/20 21:59 DC 3/26/20 22:00


58.333 MLS/HR


 


 Sodium Chloride


 90 meq/Potassium


 Phosphate 15 mmol/


 Magnesium Sulfate


 12 meq/Calcium


 Gluconate 15 meq/


 Multivitamins 10


 ml/Chromium/


 Copper/Manganese/


 Seleni/Zn 0.5 ml/


 Insulin Human


 Regular 30 unit/


 Total Parenteral


 Nutrition/Amino


 Acids/Dextrose/


 Fat Emulsion


 Intravenous  1,400 ml @ 


 58.333 mls/


 hr  TPN  CONT  4/10/20 22:00


 4/11/20 21:59 DC 4/10/20 21:49


58.333 MLS/HR


 


 Sodium Chloride


 90 meq/Potassium


 Phosphate 15 mmol/


 Magnesium Sulfate


 12 meq/Calcium


 Gluconate 15 meq/


 Multivitamins 10


 ml/Chromium/


 Copper/Manganese/


 Seleni/Zn 0.5 ml/


 Insulin Human


 Regular 40 unit/


 Total Parenteral


 Nutrition/Amino


 Acids/Dextrose/


 Fat Emulsion


 Intravenous  1,400 ml @ 


 58.333 mls/


 hr  TPN  CONT  4/11/20 22:00


 4/12/20 21:59 DC 4/11/20 21:21


58.333 MLS/HR


 


 Sodium Chloride


 90 meq/Potassium


 Phosphate 19 mmol/


 Magnesium Sulfate


 12 meq/Calcium


 Gluconate 15 meq/


 Multivitamins 10


 ml/Chromium/


 Copper/Manganese/


 Seleni/Zn 0.5 ml/


 Insulin Human


 Regular 40 unit/


 Total Parenteral


 Nutrition/Amino


 Acids/Dextrose/


 Fat Emulsion


 Intravenous  1,400 ml @ 


 58.333 mls/


 hr  TPN  CONT  4/12/20 22:00


 4/13/20 21:59 DC 4/12/20 21:54


58.333 MLS/HR


 


 Sodium Chloride


 90 meq/Potassium


 Phosphate 5 mmol/


 Magnesium Sulfate


 12 meq/Calcium


 Gluconate 15 meq/


 Multivitamins 10


 ml/Chromium/


 Copper/Manganese/


 Seleni/Zn 0.5 ml/


 Insulin Human


 Regular 30 unit/


 Total Parenteral


 Nutrition/Amino


 Acids/Dextrose/


 Fat Emulsion


 Intravenous  1,400 ml @ 


 58.333 mls/


 hr  TPN  CONT  4/9/20 22:00


 4/10/20 21:59 DC 4/9/20 22:08


58.333 MLS/HR


 


 Sodium Chloride


 100 meq/Potassium


 Chloride 40 meq/


 Magnesium Sulfate


 15 meq/Calcium


 Gluconate 15 meq/


 Multivitamins 10


 ml/Chromium/


 Copper/Manganese/


 Seleni/Zn 0.5 ml/


 Insulin Human


 Regular 35 unit/


 Total Parenteral


 Nutrition/Amino


 Acids/Dextrose/


 Fat Emulsion


 Intravenous  1,400 ml @ 


 58.333 mls/


 hr  TPN  CONT  4/19/20 22:00


 4/20/20 21:59 DC 4/19/20 22:46


58.333 MLS/HR


 


 Sodium Chloride


 100 meq/Potassium


 Chloride 40 meq/


 Magnesium Sulfate


 20 meq/Calcium


 Gluconate 10 meq/


 Multivitamins 10


 ml/Chromium/


 Copper/Manganese/


 Seleni/Zn 0.5 ml/


 Insulin Human


 Regular 35 unit/


 Total Parenteral


 Nutrition/Amino


 Acids/Dextrose/


 Fat Emulsion


 Intravenous  1,400 ml @ 


 58.333 mls/


 hr  TPN  CONT  4/23/20 22:00


 4/24/20 21:59 DC 4/24/20 00:06


58.333 MLS/HR


 


 Sodium Chloride


 100 meq/Potassium


 Chloride 40 meq/


 Magnesium Sulfate


 20 meq/Calcium


 Gluconate 15 meq/


 Multivitamins 10


 ml/Chromium/


 Copper/Manganese/


 Seleni/Zn 0.5 ml/


 Insulin Human


 Regular 35 unit/


 Total Parenteral


 Nutrition/Amino


 Acids/Dextrose/


 Fat Emulsion


 Intravenous  1,400 ml @ 


 58.333 mls/


 hr  TPN  CONT  4/22/20 22:00


 4/23/20 21:59 DC 4/22/20 22:27


58.333 MLS/HR


 


 Sodium Chloride


 100 meq/Potassium


 Phosphate 10 mmol/


 Magnesium Sulfate


 12 meq/Calcium


 Gluconate 15 meq/


 Multivitamins 10


 ml/Chromium/


 Copper/Manganese/


 Seleni/Zn 0.5 ml/


 Insulin Human


 Regular 35 unit/


 Potassium


 Chloride 20 meq/


 Total Parenteral


 Nutrition/Amino


 Acids/Dextrose/


 Fat Emulsion


 Intravenous  1,400 ml @ 


 58.333 mls/


 hr  TPN  CONT  4/16/20 22:00


 4/17/20 21:59 DC 4/16/20 22:10


58.333 MLS/HR


 


 Sodium Chloride


 100 meq/Potassium


 Phosphate 19 mmol/


 Magnesium Sulfate


 12 meq/Calcium


 Gluconate 15 meq/


 Multivitamins 10


 ml/Chromium/


 Copper/Manganese/


 Seleni/Zn 0.5 ml/


 Insulin Human


 Regular 40 unit/


 Potassium


 Chloride 20 meq/


 Total Parenteral


 Nutrition/Amino


 Acids/Dextrose/


 Fat Emulsion


 Intravenous  1,400 ml @ 


 58.333 mls/


 hr  TPN  CONT  4/15/20 22:00


 4/16/20 21:59 DC 4/15/20 21:20


58.333 MLS/HR


 


 Sodium Chloride


 100 meq/Potassium


 Phosphate 5 mmol/


 Magnesium Sulfate


 12 meq/Calcium


 Gluconate 15 meq/


 Multivitamins 10


 ml/Chromium/


 Copper/Manganese/


 Seleni/Zn 0.5 ml/


 Insulin Human


 Regular 35 unit/


 Potassium


 Chloride 20 meq/


 Total Parenteral


 Nutrition/Amino


 Acids/Dextrose/


 Fat Emulsion


 Intravenous  1,400 ml @ 


 58.333 mls/


 hr  TPN  CONT  4/17/20 22:00


 4/18/20 21:59 DC 4/17/20 22:59


58.333 MLS/HR


 


 Succinylcholine


 Chloride


  (Anectine)  120 mg  1X  ONCE  3/23/20 08:30


 3/23/20 08:31 DC 3/23/20 08:34


120 MG


 


 Vecuronium Bromide


  (Norcuron Bolus)  6 mg  PRN Q6HRS  PRN  5/7/20 19:15


 5/7/20 19:35 DC  














Labs:


Lab





Laboratory Tests








Test


 5/24/20


11:25 5/24/20


23:39 5/25/20


06:50 5/25/20


06:52


 


White Blood Count


 16.0 x10^3/uL


(4.0-11.0) 


 


 





 


Red Blood Count


 2.94 x10^6/uL


(3.50-5.40) 


 


 





 


Hemoglobin


 8.5 g/dL


(12.0-15.5) 


 


 





 


Hematocrit


 25.5 %


(36.0-47.0) 


 


 





 


Mean Corpuscular Volume 87 fL ()    


 


Mean Corpuscular Hemoglobin 29 pg (25-35)    


 


Mean Corpuscular Hemoglobin


Concent 34 g/dL


(31-37) 


 


 





 


Red Cell Distribution Width


 19.0 %


(11.5-14.5) 


 


 





 


Platelet Count


 317 x10^3/uL


(140-400) 


 


 





 


Sodium Level


 136 mmol/L


(136-145) 


 135 mmol/L


(136-145) 





 


Potassium Level


 4.8 mmol/L


(3.5-5.1) 


 5.0 mmol/L


(3.5-5.1) 





 


Chloride Level


 98 mmol/L


() 


 97 mmol/L


() 





 


Carbon Dioxide Level


 34 mmol/L


(21-32) 


 34 mmol/L


(21-32) 





 


Anion Gap 4 (6-14)   4 (6-14)  


 


Blood Urea Nitrogen


 23 mg/dL


(7-20) 


 24 mg/dL


(7-20) 





 


Creatinine


 0.8 mg/dL


(0.6-1.0) 


 0.8 mg/dL


(0.6-1.0) 





 


Estimated GFR


(Cockcroft-Gault) 76.2 


 


 76.2 


 





 


BUN/Creatinine Ratio 29 (6-20)   30 (6-20)  


 


Glucose Level


 131 mg/dL


(70-99) 


 140 mg/dL


(70-99) 





 


Calcium Level


 10.1 mg/dL


(8.5-10.1) 


 10.0 mg/dL


(8.5-10.1) 





 


Total Bilirubin


 0.7 mg/dL


(0.2-1.0) 


 0.7 mg/dL


(0.2-1.0) 





 


Aspartate Amino Transf


(AST/SGOT) 34 U/L (15-37) 


 


 26 U/L (15-37) 


 





 


Alanine Aminotransferase


(ALT/SGPT) 25 U/L (14-59) 


 


 25 U/L (14-59) 


 





 


Alkaline Phosphatase


 131 U/L


() 


 119 U/L


() 





 


Total Protein


 6.9 g/dL


(6.4-8.2) 


 6.9 g/dL


(6.4-8.2) 





 


Albumin


 2.4 g/dL


(3.4-5.0) 


 2.3 g/dL


(3.4-5.0) 





 


Albumin/Globulin Ratio 0.5 (1.0-1.7)   0.5 (1.0-1.7)  


 


Glucose (Fingerstick)


 


 161 mg/dL


(70-99) 


 135 mg/dL


(70-99)


 


Phosphorus Level


 


 


 5.2 mg/dL


(2.6-4.7) 





 


Magnesium Level


 


 


 2.3 mg/dL


(1.8-2.4) 





 


Triglycerides Level


 


 


 217 mg/dL


(0-150) 














Objective:


Assessment:


Fever intermittent could be from underlying pancreatitis,  


Acute pancreatitis with persistent  necrosis


CT a/p 4/9


    Increased ascites. Persistent evidence of necrotizing pancreatitis with 

fluid and phlegmon


   at the pancreas


   4/27 status post ROBERT drain placement; yeast


   5/6 fluid  candida parapsilosis fluid amylase high


Cholelithiasis with thickening of the gallbladder wall.


Leucocytosis 


JED,Hyperkalemia, Metabolic acidosis off dialysis


Acute hypoxic resp failure ,bilateral pleural effusion and atelectasis


hypocalcemia 


Prediabetes


HTN


s/p trach





Plan:


Plan of Care


cont  Micafungin, 5/4


    Off dapto/zyvox/merrem


Maintain aspiration precaution


Supportive care





Critically ill











IVAN FRANZ MD           May 25, 2020 08:03

## 2020-05-25 NOTE — PDOC
Provider Note


Provider Note


DAINA Bal for Dr Flores





up to chair


awake, alert, conversive


has tried a few ice chips








continue supportive care





Hemodynamically unstable?:  No


Is patient in severe pain?:  No


Is NPO status required?:  Yes











KIEL BAL MD                May 25, 2020 14:34

## 2020-05-25 NOTE — PDOC
[facility] PROGRESS NOTE


Chief Complaint


Chief Complaint


Acute hypoxic Respiratory failure requiring mechanical ventilation (now 

extubated for several days but still with tracheostomy)


Tracheostomy


bilateral pleural effusions/pulm edema 


Sepsis


Severe Acute gallstone pancreatitis (not a surgical candidate at this time) with

necrosis


Acute kidney failure now requiring dialysis


Salpingitis


Gallstones (Calculus of gallbladder with acute cholecystitis without obstruc

tion)


HTN 


Leukocytosis 


Hypoxia


Uterine fibroid


Intractable pain


Intractable nausea


Covid 19 negative. 


Acute on chronic anemia 


EEG: No seizure activityFever  - better currently - intermittent could be from 

underlying pancreatitis blood cults 5/4 - neg so far


? Ileus with vomiting


Abd distention - U/S and CT reviewed s/p 0.4 L of opaque, debris-containing 

ascites was removed 5/6


Acute pancreatitis with persistent necrosis


- 4/27 status post ROBERT drain placement + C paropsilosis. s/p additional drains 

5/8


Anemia - S/p PRBCs


Cholelithiasis with thickening of the gallbladder wall.


Leucocytosis improving


JED, hyperkalemia, Metabolic acidosis off dialysis


Acute hypoxic resp failure ,bilateral pleural effusion and atelectasis


hypocalcemia 


Prediabetes


HTN


s/p trach


ESRD on HD


Hyperglycemia





History of Present Illness


History of Present Illness


5/25/2020


Patient seen and examined in the ICU


Patient up in the chair


Having a severe coughing episode with a lot of phlegm coming out of her 

tracheostomy


Discussed with RN


Discussed with physical therapy


Chart reviewed











5/24,.    feels well,  has been out of room in wheelchair


no complaint,    still weak,   some with not wanting to wear her valve on trach


cont other,   may be able to work with speech tomorrow,    ketty OK to 

try, 








5/23,  anxiety is up today,   she dislikes the valve still,    


shower and outside today


.   





5/22 she doesnt want to wear her passy-kristan valve,  discussed str and plan with 

her.


speech following,  needs swallow study,   but needs to wear her valve longer,  


cont current


able to walk some, walker 





5/21  stronger,   better,   


we discussed better oral care


she would like to try swallow study,  wants to try to eat,    speech is 

following








5/20/2020 


She remains in the ICU


sitting up and working with OT,   getting better


if limit pain meds, may do better off the vent, 





Nurses trying to suction her,  that is also improved, 


Chart reviewed


 








5/19/2020


Patient seen and examined in the ICU


She had an episode yesterday of tachycardia and severe agitation we gave her 

some Ativan


After that she seemed to have stroke symptoms but now that the Ativan has wore 

off her stroke symptoms have resolved


 


 


She is on IV meropenem and daptomycin and micafungin


Chart reviewed


Discussed with RN


Patient is still critically ill


 


BRIEF OPERATIVE NOTE


Pre-Op Diagnosis


Pancreatitis with pseudocysts, suspected infection


Post-Op Diagnosis


same


Procedure Performed


CT abdominal Drains x 3


Surgeon


Hardy


Anesthesia Type:  Conscious Sedation


Findings


3 abdominal drains, 14F, with turbid pancreatic fluid and necrotic debris in 

each.


Complications


No immediate








5/9: Patient today somewhat restless and having bilious secretions from ET tube,

imaging studies ordered, discussed with consultant. Pretty poor prognosis, 

hopefully is not a fistula, poor surgical candidate. 





5/10: Imaging with no acute events, she seems more stable today compared to 

yesterday. Encouraged as much activity as possible patient at high risk for 

severe depression.





Vitals/I&O


Vitals/I&O:





                                   Vital Signs








  Date Time  Temp Pulse Resp B/P (MAP) Pulse Ox O2 Delivery O2 Flow Rate FiO2


 


5/25/20 11:09   40  98 Tracheal Collar 8.0 


 


5/25/20 11:00  136  141/80 (100)    


 


5/25/20 08:37 99.6       





 99.6       














                                    I & O   


 


 5/24/20 5/24/20 5/25/20





 15:00 23:00 07:00


 


Intake Total 100 ml 893 ml 1003 ml


 


Output Total 1870 ml 1780 ml 1245 ml


 


Balance -1770 ml -887 ml -242 ml











Physical Exam


Physical Exam:


GENERAL: Just got up in the chair having a lot of coughing


HEENT:  Oral cavity clear,  NGT


NECK:  Trach shield


LUNGS: A lot of coughing with productive sputum from the tracheostomy site


HEART:  S1, S2, regular 


ABDOMEN: mod distention, hypoactive BS, tender, + drains x 3


: [place] (4/14)


EXTREMITIES: Generalized edema, improving, no cyanosis, SCDs bilaterally 


SKIN: Warm and dry.  No generalized rash.  


CNS: Very weak 


RUE-PICC (4/30) clean


General:  Alert, No acute distress


Heart:  Regular rate, Normal S1, Normal S2, No murmurs, Gallops


Lungs:  Other (Good air movement but having a lot of productive sputum from her 

tracheostomy site)


Abdomen:  Soft


Extremities:  No clubbing, No cyanosis, No edema, Normal pulses, No 

tenderness/swelling


Skin:  Other (warm, dry)





Labs


Labs:





Laboratory Tests








Test


 5/24/20


23:39 5/25/20


06:50 5/25/20


06:52


 


Glucose (Fingerstick)


 161 mg/dL


(70-99) 


 135 mg/dL


(70-99)


 


Sodium Level


 


 135 mmol/L


(136-145) 





 


Potassium Level


 


 5.0 mmol/L


(3.5-5.1) 





 


Chloride Level


 


 97 mmol/L


() 





 


Carbon Dioxide Level


 


 34 mmol/L


(21-32) 





 


Anion Gap  4 (6-14)  


 


Blood Urea Nitrogen


 


 24 mg/dL


(7-20) 





 


Creatinine


 


 0.8 mg/dL


(0.6-1.0) 





 


Estimated GFR


(Cockcroft-Gault) 


 76.2 


 





 


BUN/Creatinine Ratio  30 (6-20)  


 


Glucose Level


 


 140 mg/dL


(70-99) 





 


Calcium Level


 


 10.0 mg/dL


(8.5-10.1) 





 


Phosphorus Level


 


 5.2 mg/dL


(2.6-4.7) 





 


Magnesium Level


 


 2.3 mg/dL


(1.8-2.4) 





 


Total Bilirubin


 


 0.7 mg/dL


(0.2-1.0) 





 


Aspartate Amino Transf


(AST/SGOT) 


 26 U/L (15-37) 


 





 


Alanine Aminotransferase


(ALT/SGPT) 


 25 U/L (14-59) 


 





 


Alkaline Phosphatase


 


 119 U/L


() 





 


Total Protein


 


 6.9 g/dL


(6.4-8.2) 





 


Albumin


 


 2.3 g/dL


(3.4-5.0) 





 


Albumin/Globulin Ratio  0.5 (1.0-1.7)  


 


Triglycerides Level


 


 217 mg/dL


(0-150) 














Assessment and Plan


Assessmemt and Plan


Problems


Medical Problems:


(1) Acute pancreatitis


Status: Acute  





(2) Cholelithiasis


Status: Acute  





Acute hypoxic Respiratory failure requiring mechanical ventilation was initially

intubated on 3/23 was off for couple of days now back on


Severe gallstone pancreatitis (not a surgical candidate at this time) with 

necrosis


Tracheostomy


bilateral pleural effusions/pulm edema 


Severe sepsis


Acute kidney failure now requiring dialysis


Salpingitis


Gallstones (Calculus of gallbladder with acute cholecystitis without 

obstruction)


HTN 


Leukocytosis 


Hypoxia


Uterine fibroid


Intractable pain


Intractable nausea


Covid 19 negative. 


Acute on chronic anemia 


EEG: No seizure activityFever  - better currently - intermittent could be from 

underlying pancreatitis blood cults 5/4 - neg so far


? Ileus with vomiting


Abd distention - U/S and CT reviewed s/p 0.4 L of opaque, debris-containing 

ascites was removed 5/6


Acute pancreatitis with persistent necrosis


- 4/27 status post ROBERT drain placement + C paropsilosis. s/p additional drains 

5/8


Anemia - S/p PRBCs


Cholelithiasis with thickening of the gallbladder wall.


Leucocytosis improving


JED, hyperkalemia, Metabolic acidosis off dialysis


Acute hypoxic resp failure ,bilateral pleural effusion and atelectasis


hypocalcemia 


Prediabetes


HTN


s/p trach


ESRD on HD


Hyperglycemia








Plan


ICU monitoring


Wound care


Hold off on Ativan for now as she gets too weak with it


Humidified O2 via nasal cannula for now but we can use trach shield as backup


NG suctioning


Nebulizers


Continue IV antibiotics and micafungin


Sedation with Precedex PRN


TPN protocol


Continue Chino to bedside drainage


Tracheostomy care


Hope to eventually move towards decannulation (we have a speaking valve for now)


Trend labs


Appreciate subspecialist input


She is still critically ill


I am still very concerned about her long-term prognosis ! (she scored a 9 on 

Vandana criteria 3 weeks ago). 


Total time 31-minute





Comment


Review of Relevant


I have reviewed the following items josy (where applicable) has been applied.


Medications:





Current Medications








 Medications


  (Trade)  Dose


 Ordered  Sig/Yee


 Route


 PRN Reason  Start Time


 Stop Time Status Last Admin


Dose Admin


 


 Potassium


 Chloride 110 meq/


 Magnesium Sulfate


 20 meq/


 Multivitamins 10


 ml/Chromium/


 Copper/Manganese/


 Seleni/Zn 1 ml/


 Insulin Human


 Regular 15 unit/


 Total Parenteral


 Nutrition/Amino


 Acids/Dextrose/


 Fat Emulsion


 Intravenous  1,800 ml @ 


 75 mls/hr  TPN  CONT


 IV


   5/24/20 22:00


 5/25/20 21:59  5/24/20 22:48














Hemodynamically unstable?:  No


Is patient in severe pain?:  No


Is NPO status required?:  Yes











HECTOR MASON III DO           May 25, 2020 11:48

## 2020-05-25 NOTE — NUR
Patient very anxious when I came on shift, saying she feels awful and has felt this bad all 
day. I told her we would need to wear the speaking valve at least 2x tonight with the goal 
being 2 hours each time. Speaking valve was placed on patient at 1945 and patient still 
extremely anxious. Bolus of Precedex given and patient seemed to calm down. Patient wore 
speaking valve for 45 minutes before getting extremely anxious and trying to take speaking 
valve off herself saying "This is too much, I need it off" and "I'll put it back on later 
but I can't do it anymore". Speaking valve was taken off after trying to talk patient down 
with no success and informing her that wearing it for longer periods of time is the only way 
to work toward getting to eat/drink. She states that she knows this but just can't do it. 
Will attempt again later.

## 2020-05-25 NOTE — NUR
Pharmacy TPN Dosing Note



S: JESENIA BEAN is a 49 year old F Currently receiving Central Continuous TPN started 
03/18/20



B:Pertinent PMH: Necrotizing pancreatitis



Height: 5 feet, 8 inches

Weight: 77.0 kg



Current diet: NPO 



LABS:

Sodium:    135 

Potassium: 5 

Chloride:  97 

Calcium:   10.0 

Corrected Calcium: 11.36 

Magnesium: 2.3 

CO2:       34 

SCr:       0.8 

Glucose:   161, 140, 131 

Albumin:   2.3 

AST:       26 

ALT:       25 



TPN FORMULA:

TPN TYPE:  Central Continuous

AMINO ACIDS:         70 gm

DEXTROSE:            250 gm

LIPIDS:              30 gm

POTASSIUM CHLORIDE:  70 mEq

MAGNESIUM:           20 mEq

INSULIN:             15 units

MULTIPLE VITAMIN:    10 ml

TRACE ELEMENTS:      1 ml



TPN PLAN:  

-Serum potassium trending to upper range of normal, reduce KCl to 70 mEq/day.

-, okay to continue same lipid component.

-Serum potassium tomorow.





R: Continue TPN @ 75 ml/hr and above formula. 

Will monitor electrolytes, glucose, and tolerance to TPN.



 VALERIE SNELL [facility], 05/25/20 [id number]

## 2020-05-25 NOTE — NUR
NG tube clamped from [id number] LIS for 1hr. NG tube clamped again from [id number]. NG to LIS 
Patient experienced nausea both times tube was clamped, gave Zofran twice. Patient wore 
speaking valve 2 times today. Once for 1hr another time for 1.75hrs. She does well with the 
speaking valve on, but she doesn't like it. Reinforced the importance of using the valve, 
that it will get more comfortable over time and that in order to be able to eat or drink she 
will need to wear it.

## 2020-05-26 VITALS — DIASTOLIC BLOOD PRESSURE: 83 MMHG | SYSTOLIC BLOOD PRESSURE: 131 MMHG

## 2020-05-26 VITALS — DIASTOLIC BLOOD PRESSURE: 62 MMHG | SYSTOLIC BLOOD PRESSURE: 112 MMHG

## 2020-05-26 VITALS — SYSTOLIC BLOOD PRESSURE: 121 MMHG | DIASTOLIC BLOOD PRESSURE: 78 MMHG

## 2020-05-26 VITALS — SYSTOLIC BLOOD PRESSURE: 98 MMHG | DIASTOLIC BLOOD PRESSURE: 55 MMHG

## 2020-05-26 VITALS — DIASTOLIC BLOOD PRESSURE: 54 MMHG | SYSTOLIC BLOOD PRESSURE: 93 MMHG

## 2020-05-26 VITALS — DIASTOLIC BLOOD PRESSURE: 62 MMHG | SYSTOLIC BLOOD PRESSURE: 132 MMHG

## 2020-05-26 VITALS — SYSTOLIC BLOOD PRESSURE: 112 MMHG | DIASTOLIC BLOOD PRESSURE: 68 MMHG

## 2020-05-26 VITALS — DIASTOLIC BLOOD PRESSURE: 67 MMHG | SYSTOLIC BLOOD PRESSURE: 107 MMHG

## 2020-05-26 VITALS — SYSTOLIC BLOOD PRESSURE: 125 MMHG | DIASTOLIC BLOOD PRESSURE: 71 MMHG

## 2020-05-26 VITALS — DIASTOLIC BLOOD PRESSURE: 60 MMHG | SYSTOLIC BLOOD PRESSURE: 119 MMHG

## 2020-05-26 VITALS — SYSTOLIC BLOOD PRESSURE: 135 MMHG | DIASTOLIC BLOOD PRESSURE: 75 MMHG

## 2020-05-26 VITALS — SYSTOLIC BLOOD PRESSURE: 108 MMHG | DIASTOLIC BLOOD PRESSURE: 74 MMHG

## 2020-05-26 VITALS — SYSTOLIC BLOOD PRESSURE: 107 MMHG | DIASTOLIC BLOOD PRESSURE: 62 MMHG

## 2020-05-26 VITALS — SYSTOLIC BLOOD PRESSURE: 108 MMHG | DIASTOLIC BLOOD PRESSURE: 63 MMHG

## 2020-05-26 VITALS — DIASTOLIC BLOOD PRESSURE: 60 MMHG | SYSTOLIC BLOOD PRESSURE: 110 MMHG

## 2020-05-26 VITALS — DIASTOLIC BLOOD PRESSURE: 69 MMHG | SYSTOLIC BLOOD PRESSURE: 100 MMHG

## 2020-05-26 VITALS — DIASTOLIC BLOOD PRESSURE: 68 MMHG | SYSTOLIC BLOOD PRESSURE: 135 MMHG

## 2020-05-26 VITALS — DIASTOLIC BLOOD PRESSURE: 72 MMHG | SYSTOLIC BLOOD PRESSURE: 109 MMHG

## 2020-05-26 VITALS — DIASTOLIC BLOOD PRESSURE: 58 MMHG | SYSTOLIC BLOOD PRESSURE: 109 MMHG

## 2020-05-26 VITALS — DIASTOLIC BLOOD PRESSURE: 78 MMHG | SYSTOLIC BLOOD PRESSURE: 119 MMHG

## 2020-05-26 VITALS — SYSTOLIC BLOOD PRESSURE: 102 MMHG | DIASTOLIC BLOOD PRESSURE: 62 MMHG

## 2020-05-26 VITALS — SYSTOLIC BLOOD PRESSURE: 102 MMHG | DIASTOLIC BLOOD PRESSURE: 61 MMHG

## 2020-05-26 VITALS — SYSTOLIC BLOOD PRESSURE: 112 MMHG | DIASTOLIC BLOOD PRESSURE: 71 MMHG

## 2020-05-26 LAB
ANION GAP SERPL CALC-SCNC: 4 MMOL/L (ref 6–14)
BASOPHILS # BLD AUTO: 0 X10^3/UL (ref 0–0.2)
BASOPHILS NFR BLD: 0 % (ref 0–3)
BUN SERPL-MCNC: 28 MG/DL (ref 7–20)
CALCIUM SERPL-MCNC: 10 MG/DL (ref 8.5–10.1)
CHLORIDE SERPL-SCNC: 97 MMOL/L (ref 98–107)
CO2 SERPL-SCNC: 33 MMOL/L (ref 21–32)
CREAT SERPL-MCNC: 0.9 MG/DL (ref 0.6–1)
EOSINOPHIL NFR BLD: 0.2 X10^3/UL (ref 0–0.7)
EOSINOPHIL NFR BLD: 2 % (ref 0–3)
ERYTHROCYTE [DISTWIDTH] IN BLOOD BY AUTOMATED COUNT: 19.5 % (ref 11.5–14.5)
GFR SERPLBLD BASED ON 1.73 SQ M-ARVRAT: 66.5 ML/MIN
GLUCOSE SERPL-MCNC: 183 MG/DL (ref 70–99)
HCT VFR BLD CALC: 24.4 % (ref 36–47)
HGB BLD-MCNC: 8 G/DL (ref 12–15.5)
LYMPHOCYTES # BLD: 2 X10^3/UL (ref 1–4.8)
LYMPHOCYTES NFR BLD AUTO: 17 % (ref 24–48)
MCH RBC QN AUTO: 28 PG (ref 25–35)
MCHC RBC AUTO-ENTMCNC: 33 G/DL (ref 31–37)
MCV RBC AUTO: 87 FL (ref 79–100)
MONO #: 1 X10^3/UL (ref 0–1.1)
MONOCYTES NFR BLD: 9 % (ref 0–9)
NEUT #: 8.2 X10^3/UL (ref 1.8–7.7)
NEUTROPHILS NFR BLD AUTO: 72 % (ref 31–73)
PLATELET # BLD AUTO: 325 X10^3/UL (ref 140–400)
POTASSIUM SERPL-SCNC: 4.6 MMOL/L (ref 3.5–5.1)
RBC # BLD AUTO: 2.8 X10^6/UL (ref 3.5–5.4)
SODIUM SERPL-SCNC: 134 MMOL/L (ref 136–145)
WBC # BLD AUTO: 11.4 X10^3/UL (ref 4–11)

## 2020-05-26 RX ADMIN — INSULIN LISPRO SCH UNITS: 100 INJECTION, SOLUTION INTRAVENOUS; SUBCUTANEOUS at 12:00

## 2020-05-26 RX ADMIN — FUROSEMIDE SCH MG: 10 INJECTION, SOLUTION INTRAMUSCULAR; INTRAVENOUS at 15:00

## 2020-05-26 RX ADMIN — INSULIN LISPRO SCH UNITS: 100 INJECTION, SOLUTION INTRAVENOUS; SUBCUTANEOUS at 23:30

## 2020-05-26 RX ADMIN — DEXMEDETOMIDINE HYDROCHLORIDE PRN MLS/HR: 100 INJECTION, SOLUTION, CONCENTRATE INTRAVENOUS at 15:16

## 2020-05-26 RX ADMIN — INSULIN LISPRO SCH UNITS: 100 INJECTION, SOLUTION INTRAVENOUS; SUBCUTANEOUS at 17:54

## 2020-05-26 RX ADMIN — DEXMEDETOMIDINE HYDROCHLORIDE PRN MLS/HR: 100 INJECTION, SOLUTION, CONCENTRATE INTRAVENOUS at 08:07

## 2020-05-26 RX ADMIN — MEROPENEM SCH MLS/HR: 500 INJECTION, POWDER, FOR SOLUTION INTRAVENOUS at 17:53

## 2020-05-26 RX ADMIN — ENOXAPARIN SODIUM SCH MG: 40 INJECTION SUBCUTANEOUS at 17:53

## 2020-05-26 RX ADMIN — Medication PRN EACH: at 14:27

## 2020-05-26 RX ADMIN — MEROPENEM SCH MLS/HR: 500 INJECTION, POWDER, FOR SOLUTION INTRAVENOUS at 06:01

## 2020-05-26 RX ADMIN — FENTANYL CITRATE PRN MCG: 50 INJECTION INTRAMUSCULAR; INTRAVENOUS at 06:39

## 2020-05-26 RX ADMIN — PROCHLORPERAZINE EDISYLATE PRN MG: 5 INJECTION INTRAMUSCULAR; INTRAVENOUS at 21:22

## 2020-05-26 RX ADMIN — FUROSEMIDE SCH MG: 10 INJECTION, SOLUTION INTRAMUSCULAR; INTRAVENOUS at 08:01

## 2020-05-26 RX ADMIN — FENTANYL CITRATE PRN MCG: 50 INJECTION INTRAMUSCULAR; INTRAVENOUS at 10:51

## 2020-05-26 RX ADMIN — FENTANYL CITRATE PRN MCG: 50 INJECTION INTRAMUSCULAR; INTRAVENOUS at 21:01

## 2020-05-26 RX ADMIN — PANTOPRAZOLE SODIUM SCH MG: 40 INJECTION, POWDER, FOR SOLUTION INTRAVENOUS at 08:01

## 2020-05-26 RX ADMIN — DEXTROSE SCH MLS/HR: 50 INJECTION, SOLUTION INTRAVENOUS at 12:17

## 2020-05-26 RX ADMIN — MEROPENEM SCH MLS/HR: 500 INJECTION, POWDER, FOR SOLUTION INTRAVENOUS at 12:17

## 2020-05-26 RX ADMIN — ONDANSETRON PRN MG: 2 INJECTION INTRAMUSCULAR; INTRAVENOUS at 09:51

## 2020-05-26 RX ADMIN — MEROPENEM SCH MLS/HR: 500 INJECTION, POWDER, FOR SOLUTION INTRAVENOUS at 23:38

## 2020-05-26 RX ADMIN — FENTANYL CITRATE PRN MCG: 50 INJECTION INTRAMUSCULAR; INTRAVENOUS at 02:34

## 2020-05-26 RX ADMIN — INSULIN LISPRO SCH UNITS: 100 INJECTION, SOLUTION INTRAVENOUS; SUBCUTANEOUS at 06:02

## 2020-05-26 RX ADMIN — FENTANYL CITRATE PRN MCG: 50 INJECTION INTRAMUSCULAR; INTRAVENOUS at 16:26

## 2020-05-26 RX ADMIN — BACITRACIN SCH MLS/HR: 5000 INJECTION, POWDER, FOR SOLUTION INTRAMUSCULAR at 13:37

## 2020-05-26 NOTE — NUR
SS following up with discharge planning. SS reviewed pt chart and discussed with pt RN. Pt 
remains on trach collar, TPN, IV antibiotics, and has three ROBERT drains. Pt continues to work 
with PT/OT and make improvements. SS contacted LTACH facilities and discussed possible 
edil placement and was notified that they do not have edil beds. Pt is self pay. SS 
will continue to follow for discharge planning.

## 2020-05-26 NOTE — PDOC
SURGICAL PROGRESS NOTE


Subjective


talking this AM


wants some ice chips


Vital Signs





Vital Signs








  Date Time  Temp Pulse Resp B/P (MAP) Pulse Ox O2 Delivery O2 Flow Rate FiO2


 


5/26/20 11:00  134 26 100/69 (79) 99 Tracheal Collar 8.0 


 


5/26/20 08:00 98.5       





 98.5       








I&O











Intake and Output 


 


 5/26/20





 07:00


 


Intake Total 2046.66 ml


 


Output Total 2485 ml


 


Balance -438.34 ml


 


 


 


Intake Oral 0 ml


 


IV Total 2046.66 ml


 


Output Urine Total 2225 ml


 


Drainage Total 260 ml








General:  Alert


HEENT:  Other (trach cap on)


Abdomen:  Soft


Labs





Laboratory Tests








Test


 5/24/20


23:39 5/25/20


06:50 5/25/20


06:52 5/25/20


17:46


 


Glucose (Fingerstick)


 161 mg/dL


(70-99) 


 135 mg/dL


(70-99) 173 mg/dL


(70-99)


 


Sodium Level


 


 135 mmol/L


(136-145) 


 





 


Potassium Level


 


 5.0 mmol/L


(3.5-5.1) 


 





 


Chloride Level


 


 97 mmol/L


() 


 





 


Carbon Dioxide Level


 


 34 mmol/L


(21-32) 


 





 


Anion Gap  4 (6-14)   


 


Blood Urea Nitrogen


 


 24 mg/dL


(7-20) 


 





 


Creatinine


 


 0.8 mg/dL


(0.6-1.0) 


 





 


Estimated GFR


(Cockcroft-Gault) 


 76.2 


 


 





 


BUN/Creatinine Ratio  30 (6-20)   


 


Glucose Level


 


 140 mg/dL


(70-99) 


 





 


Calcium Level


 


 10.0 mg/dL


(8.5-10.1) 


 





 


Phosphorus Level


 


 5.2 mg/dL


(2.6-4.7) 


 





 


Magnesium Level


 


 2.3 mg/dL


(1.8-2.4) 


 





 


Total Bilirubin


 


 0.7 mg/dL


(0.2-1.0) 


 





 


Aspartate Amino Transf


(AST/SGOT) 


 26 U/L (15-37) 


 


 





 


Alanine Aminotransferase


(ALT/SGPT) 


 25 U/L (14-59) 


 


 





 


Alkaline Phosphatase


 


 119 U/L


() 


 





 


Total Protein


 


 6.9 g/dL


(6.4-8.2) 


 





 


Albumin


 


 2.3 g/dL


(3.4-5.0) 


 





 


Albumin/Globulin Ratio  0.5 (1.0-1.7)   


 


Triglycerides Level


 


 217 mg/dL


(0-150) 


 





 


Test


 5/25/20


23:55 5/26/20


05:45 5/26/20


06:00 





 


Glucose (Fingerstick)


 166 mg/dL


(70-99) 


 170 mg/dL


(70-99) 





 


White Blood Count


 


 11.4 x10^3/uL


(4.0-11.0) 


 





 


Red Blood Count


 


 2.80 x10^6/uL


(3.50-5.40) 


 





 


Hemoglobin


 


 8.0 g/dL


(12.0-15.5) 


 





 


Hematocrit


 


 24.4 %


(36.0-47.0) 


 





 


Mean Corpuscular Volume  87 fL ()   


 


Mean Corpuscular Hemoglobin  28 pg (25-35)   


 


Mean Corpuscular Hemoglobin


Concent 


 33 g/dL


(31-37) 


 





 


Red Cell Distribution Width


 


 19.5 %


(11.5-14.5) 


 





 


Platelet Count


 


 325 x10^3/uL


(140-400) 


 





 


Neutrophils (%) (Auto)  72 % (31-73)   


 


Lymphocytes (%) (Auto)  17 % (24-48)   


 


Monocytes (%) (Auto)  9 % (0-9)   


 


Eosinophils (%) (Auto)  2 % (0-3)   


 


Basophils (%) (Auto)  0 % (0-3)   


 


Neutrophils # (Auto)


 


 8.2 x10^3/uL


(1.8-7.7) 


 





 


Lymphocytes # (Auto)


 


 2.0 x10^3/uL


(1.0-4.8) 


 





 


Monocytes # (Auto)


 


 1.0 x10^3/uL


(0.0-1.1) 


 





 


Eosinophils # (Auto)


 


 0.2 x10^3/uL


(0.0-0.7) 


 





 


Basophils # (Auto)


 


 0.0 x10^3/uL


(0.0-0.2) 


 





 


Sodium Level


 


 134 mmol/L


(136-145) 


 





 


Potassium Level


 


 4.6 mmol/L


(3.5-5.1) 


 





 


Chloride Level


 


 97 mmol/L


() 


 





 


Carbon Dioxide Level


 


 33 mmol/L


(21-32) 


 





 


Anion Gap  4 (6-14)   


 


Blood Urea Nitrogen


 


 28 mg/dL


(7-20) 


 





 


Creatinine


 


 0.9 mg/dL


(0.6-1.0) 


 





 


Estimated GFR


(Cockcroft-Gault) 


 66.5 


 


 





 


Glucose Level


 


 183 mg/dL


(70-99) 


 





 


Calcium Level


 


 10.0 mg/dL


(8.5-10.1) 


 











Laboratory Tests








Test


 5/25/20


17:46 5/25/20


23:55 5/26/20


05:45 5/26/20


06:00


 


Glucose (Fingerstick)


 173 mg/dL


(70-99) 166 mg/dL


(70-99) 


 170 mg/dL


(70-99)


 


White Blood Count


 


 


 11.4 x10^3/uL


(4.0-11.0) 





 


Red Blood Count


 


 


 2.80 x10^6/uL


(3.50-5.40) 





 


Hemoglobin


 


 


 8.0 g/dL


(12.0-15.5) 





 


Hematocrit


 


 


 24.4 %


(36.0-47.0) 





 


Mean Corpuscular Volume   87 fL ()  


 


Mean Corpuscular Hemoglobin   28 pg (25-35)  


 


Mean Corpuscular Hemoglobin


Concent 


 


 33 g/dL


(31-37) 





 


Red Cell Distribution Width


 


 


 19.5 %


(11.5-14.5) 





 


Platelet Count


 


 


 325 x10^3/uL


(140-400) 





 


Neutrophils (%) (Auto)   72 % (31-73)  


 


Lymphocytes (%) (Auto)   17 % (24-48)  


 


Monocytes (%) (Auto)   9 % (0-9)  


 


Eosinophils (%) (Auto)   2 % (0-3)  


 


Basophils (%) (Auto)   0 % (0-3)  


 


Neutrophils # (Auto)


 


 


 8.2 x10^3/uL


(1.8-7.7) 





 


Lymphocytes # (Auto)


 


 


 2.0 x10^3/uL


(1.0-4.8) 





 


Monocytes # (Auto)


 


 


 1.0 x10^3/uL


(0.0-1.1) 





 


Eosinophils # (Auto)


 


 


 0.2 x10^3/uL


(0.0-0.7) 





 


Basophils # (Auto)


 


 


 0.0 x10^3/uL


(0.0-0.2) 





 


Sodium Level


 


 


 134 mmol/L


(136-145) 





 


Potassium Level


 


 


 4.6 mmol/L


(3.5-5.1) 





 


Chloride Level


 


 


 97 mmol/L


() 





 


Carbon Dioxide Level


 


 


 33 mmol/L


(21-32) 





 


Anion Gap   4 (6-14)  


 


Blood Urea Nitrogen


 


 


 28 mg/dL


(7-20) 





 


Creatinine


 


 


 0.9 mg/dL


(0.6-1.0) 





 


Estimated GFR


(Cockcroft-Gault) 


 


 66.5 


 





 


Glucose Level


 


 


 183 mg/dL


(70-99) 





 


Calcium Level


 


 


 10.0 mg/dL


(8.5-10.1) 











Problem List


Problems


Medical Problems:


(1) Acute pancreatitis


Status: Acute  





(2) Cholelithiasis


Status: Acute  








Assessment/Plan


improving





for [facility] eval today











KIEL BAL MD                May 26, 2020 12:00

## 2020-05-26 NOTE — NUR
Speaking valve was placed on patient at 0500. Patient kept saying "just give me a few more 
minutes" or "lets just do it later", but valve was placed on patient despite her protests. 
Small bolus of Precedex was given. Patient took valve off at 0530 and said "it's just too 
much, I can't do it" and said she would put it back on in a little bit. At 0545 the valve 
was placed back on the patient and she was begging for a Precedex bolus. Bolus was not 
administered and I told her that since she took the valve off so soon after the bolus last 
time she did not need another bolus. Patient left valve on until 0640 when she took it off 
and said she couldn't do it anymore.

## 2020-05-26 NOTE — PDOC
PULMONARY PROGRESS NOTES


Subjective


Patient intubated on 3/23 , s/p trach 4/6,


Remains on TS 


Denies SOB or increased cough, continues to have Nausea-- NG to LIS in place


Vitals





Vital Signs








  Date Time  Temp Pulse Resp B/P (MAP) Pulse Ox O2 Delivery O2 Flow Rate FiO2


 


5/26/20 10:00  125 24 135/68 (90) 99 Tracheal Collar 8.0 


 


5/26/20 08:00 98.5       





 98.5       








ROS:  No Chest Pain, No Abdominal Pain, No Increase Cough


General:  Alert, No acute distress


HEENT:  Other (trach midline )


Lungs:  Other (Good air movement but having a lot of productive sputum from her 

tracheostomy site)


Cardiovascular:  S1, S2


Abdomen:  Soft, Non-tender


Neuro Exam:  Alert


Extremities:  Other (+3 generalized edema )


Skin:  Warm, Dry


Labs





Laboratory Tests








Test


 5/24/20


11:25 5/24/20


23:39 5/25/20


06:50 5/25/20


06:52


 


White Blood Count


 16.0 x10^3/uL


(4.0-11.0) 


 


 





 


Red Blood Count


 2.94 x10^6/uL


(3.50-5.40) 


 


 





 


Hemoglobin


 8.5 g/dL


(12.0-15.5) 


 


 





 


Hematocrit


 25.5 %


(36.0-47.0) 


 


 





 


Mean Corpuscular Volume 87 fL ()    


 


Mean Corpuscular Hemoglobin 29 pg (25-35)    


 


Mean Corpuscular Hemoglobin


Concent 34 g/dL


(31-37) 


 


 





 


Red Cell Distribution Width


 19.0 %


(11.5-14.5) 


 


 





 


Platelet Count


 317 x10^3/uL


(140-400) 


 


 





 


Sodium Level


 136 mmol/L


(136-145) 


 135 mmol/L


(136-145) 





 


Potassium Level


 4.8 mmol/L


(3.5-5.1) 


 5.0 mmol/L


(3.5-5.1) 





 


Chloride Level


 98 mmol/L


() 


 97 mmol/L


() 





 


Carbon Dioxide Level


 34 mmol/L


(21-32) 


 34 mmol/L


(21-32) 





 


Anion Gap 4 (6-14)   4 (6-14)  


 


Blood Urea Nitrogen


 23 mg/dL


(7-20) 


 24 mg/dL


(7-20) 





 


Creatinine


 0.8 mg/dL


(0.6-1.0) 


 0.8 mg/dL


(0.6-1.0) 





 


Estimated GFR


(Cockcroft-Gault) 76.2 


 


 76.2 


 





 


BUN/Creatinine Ratio 29 (6-20)   30 (6-20)  


 


Glucose Level


 131 mg/dL


(70-99) 


 140 mg/dL


(70-99) 





 


Calcium Level


 10.1 mg/dL


(8.5-10.1) 


 10.0 mg/dL


(8.5-10.1) 





 


Total Bilirubin


 0.7 mg/dL


(0.2-1.0) 


 0.7 mg/dL


(0.2-1.0) 





 


Aspartate Amino Transf


(AST/SGOT) 34 U/L (15-37) 


 


 26 U/L (15-37) 


 





 


Alanine Aminotransferase


(ALT/SGPT) 25 U/L (14-59) 


 


 25 U/L (14-59) 


 





 


Alkaline Phosphatase


 131 U/L


() 


 119 U/L


() 





 


Total Protein


 6.9 g/dL


(6.4-8.2) 


 6.9 g/dL


(6.4-8.2) 





 


Albumin


 2.4 g/dL


(3.4-5.0) 


 2.3 g/dL


(3.4-5.0) 





 


Albumin/Globulin Ratio 0.5 (1.0-1.7)   0.5 (1.0-1.7)  


 


Glucose (Fingerstick)


 


 161 mg/dL


(70-99) 


 135 mg/dL


(70-99)


 


Phosphorus Level


 


 


 5.2 mg/dL


(2.6-4.7) 





 


Magnesium Level


 


 


 2.3 mg/dL


(1.8-2.4) 





 


Triglycerides Level


 


 


 217 mg/dL


(0-150) 





 


Test


 5/25/20


17:46 5/25/20


23:55 5/26/20


05:45 5/26/20


06:00


 


Glucose (Fingerstick)


 173 mg/dL


(70-99) 166 mg/dL


(70-99) 


 170 mg/dL


(70-99)


 


White Blood Count


 


 


 11.4 x10^3/uL


(4.0-11.0) 





 


Red Blood Count


 


 


 2.80 x10^6/uL


(3.50-5.40) 





 


Hemoglobin


 


 


 8.0 g/dL


(12.0-15.5) 





 


Hematocrit


 


 


 24.4 %


(36.0-47.0) 





 


Mean Corpuscular Volume   87 fL ()  


 


Mean Corpuscular Hemoglobin   28 pg (25-35)  


 


Mean Corpuscular Hemoglobin


Concent 


 


 33 g/dL


(31-37) 





 


Red Cell Distribution Width


 


 


 19.5 %


(11.5-14.5) 





 


Platelet Count


 


 


 325 x10^3/uL


(140-400) 





 


Neutrophils (%) (Auto)   72 % (31-73)  


 


Lymphocytes (%) (Auto)   17 % (24-48)  


 


Monocytes (%) (Auto)   9 % (0-9)  


 


Eosinophils (%) (Auto)   2 % (0-3)  


 


Basophils (%) (Auto)   0 % (0-3)  


 


Neutrophils # (Auto)


 


 


 8.2 x10^3/uL


(1.8-7.7) 





 


Lymphocytes # (Auto)


 


 


 2.0 x10^3/uL


(1.0-4.8) 





 


Monocytes # (Auto)


 


 


 1.0 x10^3/uL


(0.0-1.1) 





 


Eosinophils # (Auto)


 


 


 0.2 x10^3/uL


(0.0-0.7) 





 


Basophils # (Auto)


 


 


 0.0 x10^3/uL


(0.0-0.2) 





 


Sodium Level


 


 


 134 mmol/L


(136-145) 





 


Potassium Level


 


 


 4.6 mmol/L


(3.5-5.1) 





 


Chloride Level


 


 


 97 mmol/L


() 





 


Carbon Dioxide Level


 


 


 33 mmol/L


(21-32) 





 


Anion Gap   4 (6-14)  


 


Blood Urea Nitrogen


 


 


 28 mg/dL


(7-20) 





 


Creatinine


 


 


 0.9 mg/dL


(0.6-1.0) 





 


Estimated GFR


(Cockcroft-Gault) 


 


 66.5 


 





 


Glucose Level


 


 


 183 mg/dL


(70-99) 





 


Calcium Level


 


 


 10.0 mg/dL


(8.5-10.1) 











Laboratory Tests








Test


 5/25/20


17:46 5/25/20


23:55 5/26/20


05:45 5/26/20


06:00


 


Glucose (Fingerstick)


 173 mg/dL


(70-99) 166 mg/dL


(70-99) 


 170 mg/dL


(70-99)


 


White Blood Count


 


 


 11.4 x10^3/uL


(4.0-11.0) 





 


Red Blood Count


 


 


 2.80 x10^6/uL


(3.50-5.40) 





 


Hemoglobin


 


 


 8.0 g/dL


(12.0-15.5) 





 


Hematocrit


 


 


 24.4 %


(36.0-47.0) 





 


Mean Corpuscular Volume   87 fL ()  


 


Mean Corpuscular Hemoglobin   28 pg (25-35)  


 


Mean Corpuscular Hemoglobin


Concent 


 


 33 g/dL


(31-37) 





 


Red Cell Distribution Width


 


 


 19.5 %


(11.5-14.5) 





 


Platelet Count


 


 


 325 x10^3/uL


(140-400) 





 


Neutrophils (%) (Auto)   72 % (31-73)  


 


Lymphocytes (%) (Auto)   17 % (24-48)  


 


Monocytes (%) (Auto)   9 % (0-9)  


 


Eosinophils (%) (Auto)   2 % (0-3)  


 


Basophils (%) (Auto)   0 % (0-3)  


 


Neutrophils # (Auto)


 


 


 8.2 x10^3/uL


(1.8-7.7) 





 


Lymphocytes # (Auto)


 


 


 2.0 x10^3/uL


(1.0-4.8) 





 


Monocytes # (Auto)


 


 


 1.0 x10^3/uL


(0.0-1.1) 





 


Eosinophils # (Auto)


 


 


 0.2 x10^3/uL


(0.0-0.7) 





 


Basophils # (Auto)


 


 


 0.0 x10^3/uL


(0.0-0.2) 





 


Sodium Level


 


 


 134 mmol/L


(136-145) 





 


Potassium Level


 


 


 4.6 mmol/L


(3.5-5.1) 





 


Chloride Level


 


 


 97 mmol/L


() 





 


Carbon Dioxide Level


 


 


 33 mmol/L


(21-32) 





 


Anion Gap   4 (6-14)  


 


Blood Urea Nitrogen


 


 


 28 mg/dL


(7-20) 





 


Creatinine


 


 


 0.9 mg/dL


(0.6-1.0) 





 


Estimated GFR


(Cockcroft-Gault) 


 


 66.5 


 





 


Glucose Level


 


 


 183 mg/dL


(70-99) 





 


Calcium Level


 


 


 10.0 mg/dL


(8.5-10.1) 











Medications





Active Scripts








 Medications  Dose


 Route/Sig


 Max Daily Dose Days Date Category


 


 Bisoprolol


 Fumarate 5 Mg


 Tablet  10 Mg


 PO DAILY


   3/16/20 Reported











Impression


.


IMPRESSION:


1.  Acute hypoxemic respiratory failure secondary to ARDS status post trach,


2.  Gallstone pancreatitis


3.  Severe metabolic acidosis.stable


4.  Acute kidney injury-stable, Off HD-- continue to improve 


5.  Acute gallstone pancreatitis.


6.  Hypoalbuminemia.


7.  Moderate persistent effusions, s/p left thora  5/12


8.  Fever-  Per ID, per surgery--resolved 


9.  Chronic anemia


10. Covid 19 testing negative


11. Moderate to large ascites-S/P paracentisis


12.S/P paracentisis with 4 liters removed on 4/15/20


13. S/P IR drain placement on 5/8/2020





Plan


.


1.Continue supplemental oxygen via trach shield--  PMV/capping as tolerated


2. s/p  thoracentesis, 5/12, 3 litres removed


3. Follow surgery recs-- S/P 3 drain placed in IR on 5/8/2020


4. Follow ID recs for ABX


5. Follow nephrology recs 


6. Continue TPN 


DVT/GI PPX: heparin SQ/ protonix 


D/W RN and RT, 





CODE:FULL











SHARYN SOLORZANO MD                 May 26, 2020 10:19

## 2020-05-26 NOTE — PDOC
Infectious Disease Note


Subjective:


Subjective


Patient resting quietly


on trach shield, 


T max 100.4


afebrile this a.m.





Vital Signs:


Vital Signs





Vital Signs








  Date Time  Temp Pulse Resp B/P (MAP) Pulse Ox O2 Delivery O2 Flow Rate FiO2


 


5/26/20 07:00  99 15 109/58 (75) 99 Tracheal Collar 8.0 


 


5/26/20 04:00 98.2       





 98.2       











Physical Exam:


PHYSICAL EXAM


GENERAL: Just got up in the chair having a lot of coughing


HEENT:  Oral cavity clear,  NGT


NECK:  Trach shield


LUNGS: A lot of coughing with productive sputum from the tracheostomy site


HEART:  S1, S2, regular 


ABDOMEN: mod distention, hypoactive BS, tender, + drains x 3


: [place] (4/14)


EXTREMITIES: Generalized edema, improving, no cyanosis, SCDs bilaterally 


SKIN: Warm and dry.  No generalized rash.  


CNS: Very weak 


RUE-PICC (4/30) clean





Medications:


Inpatient Meds:





Current Medications








 Medications


  (Trade)  Dose


 Ordered  Sig/Yee  Start Time


 Stop Time Status Last Admin


Dose Admin


 


 Acetaminophen


  (Tylenol Supp)  650 mg  PRN Q6HRS  PRN  3/24/20 10:30


    5/5/20 09:12


650 MG


 


 Acetaminophen


  (Tylenol)  650 mg  PRN Q6HRS  PRN  3/21/20 03:36


 5/13/20 10:25 DC 4/16/20 19:56


650 MG


 


 Albumin Human  100 ml @ 


 100 mls/hr  1X PRN  PRN  5/19/20 01:30


     





 


 Albuterol Sulfate


  (Ventolin Neb


 Soln)  2.5 mg  1X  ONCE  3/17/20 22:30


 3/17/20 22:31 DC 3/18/20 00:56


2.5 MG


 


 Alteplase,


 Recombinant


  (Cathflo For


 Central Catheter


 Clearance)  1 mg  1X  ONCE  4/24/20 10:45


 4/24/20 10:46 DC 4/24/20 11:44


1 MG


 


 Amino Acids/


 Glycerin/


 Electrolytes  1,000 ml @ 


 75 mls/hr  [id number]  4/20/20 21:15


   UNV  





 


 Artificial Tears


  (Artificial


 Tears)  1 drop  PRN Q15MIN  PRN  4/29/20 05:30


    5/24/20 11:15


1 DROP


 


 Atenolol


  (Tenormin)  100 mg  DAILY  3/17/20 09:00


 3/16/20 20:08 DC  





 


 Atropine Sulfate


  (ATROPINE 0.5mg


 SYRINGE)  0.5 mg  PRN Q5MIN  PRN  4/2/20 08:15


     





 


 Benzocaine


  (Hurricaine One)  1 spray  1X  ONCE  3/20/20 14:30


 3/20/20 14:31 DC 3/20/20 16:38


1 SPRAY


 


 Bisacodyl


  (Dulcolax Supp)  10 mg  STK-MED ONCE  4/27/20 10:59


 4/27/20 10:59 DC  





 


 Bumetanide


  (Bumex)  2 mg  DAILY  5/8/20 10:00


 5/18/20 17:15 DC 5/18/20 08:07


2 MG


 


 Bupivacaine HCl/


 Epinephrine Bitart


  (Sensorcain-Epi


 0.5%-1:000711 Mpf)  30 ml  STK-MED ONCE  4/27/20 10:58


 4/27/20 10:58 DC 4/27/20 12:01


7 ML


 


 Calcium Carbonate/


 Glycine


  (Tums)  500 mg  PRN [id number]  PRN  3/18/20 17:45


 5/13/20 10:25 DC  





 


 Calcium Chloride


 1000 mg/Sodium


 Chloride  110 ml @ 


 220 mls/hr  1X  ONCE  3/17/20 22:30


 3/17/20 22:59 DC 3/17/20 22:11


220 MLS/HR


 


 Calcium Chloride


 3000 mg/Sodium


 Chloride  1,030 ml @ 


 50 mls/hr  [id number]  3/19/20 08:00


 3/21/20 15:23 DC 3/21/20 02:17


50 MLS/HR


 


 Calcium Gluconate


  (Calcium


 Gluconate)  2,000 mg  1X  ONCE  3/19/20 02:15


 3/19/20 02:16 DC 3/19/20 02:19


2,000 MG


 


 Calcium Gluconate


 1000 mg/Sodium


 Chloride  110 ml @ 


 220 mls/hr  1X  ONCE  3/18/20 03:30


 3/18/20 03:59 DC 3/18/20 03:21


220 MLS/HR


 


 Calcium Gluconate


 2000 mg/Sodium


 Chloride  120 ml @ 


 220 mls/hr  1X  ONCE  3/18/20 07:30


 3/18/20 08:02 DC 3/18/20 09:05


220 MLS/HR


 


 Cefepime HCl


  (Maxipime)  2 gm  Q12HR  3/25/20 09:00


 4/8/20 09:58 DC 4/7/20 20:56


2 GM


 


 Cellulose


  (Surgicel


 Fibrillar 1x2)  1 each  STK-MED ONCE  4/6/20 11:00


 4/6/20 11:01 DC  





 


 Cellulose


  (Surgicel


 Hemostat 2x14)  1 each  STK-MED ONCE  4/27/20 10:58


 4/27/20 10:59 DC  





 


 Cellulose


  (Surgicel


 Hemostat 4x8)  1 each  STK-MED ONCE  4/27/20 10:58


 4/27/20 10:59 DC  





 


 Cyclobenzaprine


 HCl


  (Flexeril)  10 mg  PRN Q6HRS  PRN  4/30/20 10:45


     





 


 Daptomycin 430 mg/


 Sodium Chloride  50 ml @ 


 100 mls/hr  Q24H  4/25/20 13:00


 4/30/20 20:58 DC 4/30/20 13:00


100 MLS/HR


 


 Daptomycin 450 mg/


 Sodium Chloride  50 ml @ 


 100 mls/hr  Q24H  5/17/20 09:00


 5/21/20 08:30 DC 5/20/20 09:25


100 MLS/HR


 


 Daptomycin 485 mg/


 Sodium Chloride  50 ml @ 


 100 mls/hr  Q24H  5/4/20 11:00


 5/12/20 07:44 DC 5/11/20 13:10


100 MLS/HR


 


 Daptomycin 500 mg/


 Sodium Chloride  50 ml @ 


 100 mls/hr  Q48H  3/25/20 08:30


 4/10/20 10:07 DC 4/10/20 09:57


100 MLS/HR


 


 Dexamethasone


 Sodium Phosphate


  (Decadron)  4 mg  STK-MED ONCE  4/27/20 10:56


 4/27/20 10:57 DC  





 


 Dexmedetomidine


 HCl 400 mcg/


 Sodium Chloride  100 ml @ 0


 mls/hr  CONT  PRN  4/2/20 08:15


    5/26/20 08:07


5.4 MLS/HR


 


 Dextrose


  (Dextrose


 50%-Water Syringe)  12.5 gm  PRN Q15MIN  PRN  3/16/20 09:30


     





 


 Digoxin


  (Lanoxin)  125 mcg  1X  ONCE  3/19/20 18:00


 3/19/20 18:01 DC 3/19/20 17:10


125 MCG


 


 Diphenhydramine


 HCl


  (Benadryl)  25 mg  1X PRN  PRN  4/24/20 15:45


 4/25/20 15:44 DC  





 


 Duloxetine HCl


  (Cymbalta)  30 mg  DAILY  5/10/20 14:00


 5/13/20 10:25 DC 5/11/20 09:48


30 MG


 


 Enoxaparin Sodium


  (Lovenox 100mg


 Syringe)  100 mg  Q12HR  4/21/20 21:00


   UNV  





 


 Enoxaparin Sodium


  (Lovenox 40mg


 Syringe)  40 mg  Q24H  5/17/20 17:00


    5/25/20 17:48


40 MG


 


 Etomidate


  (Amidate)  8 mg  1X  ONCE  3/23/20 08:30


 3/23/20 08:31 DC 3/23/20 08:33


8 MG


 


 Fentanyl Citrate


  (Fentanyl 2ml


 Vial)  25 mcg  PRN Q4HRS  PRN  5/18/20 13:15


    5/24/20 06:36


25 MCG


 


 Fentanyl Citrate


  (Fentanyl 5ml


 Vial)  250 mcg  1X  ONCE  5/8/20 09:15


 5/8/20 09:16 DC 5/8/20 09:30


50 MCG


 


 Furosemide


  (Lasix)  40 mg  [id number]  5/19/20 14:00


    5/26/20 08:01


40 MG


 


 Haloperidol


 Lactate


  (Haldol Inj)  3 mg  1X  ONCE  5/4/20 14:30


 5/4/20 14:31 DC 5/4/20 14:37


3 MG


 


 Heparin Sodium


  (Porcine)


  (Hep Lock Adult)  500 unit  STK-MED ONCE  4/7/20 09:29


 4/7/20 09:30 DC  





 


 Heparin Sodium


  (Porcine)


  (Heparin Sodium)  5,000 unit  Q12HR  4/27/20 21:00


 5/7/20 09:59 DC 5/6/20 20:57


5,000 UNIT


 


 Heparin Sodium


  (Porcine) 1000


 unit/Sodium


 Chloride  1,001 ml @ 


 1,001 mls/hr  1X  ONCE  4/27/20 12:00


 4/27/20 12:59 DC  





 


 Hydromorphone HCl


  (Dilaudid


 Standard PCA)  12 mg  STK-MED ONCE  5/1/20 15:50


 5/12/20 11:24 DC  





 


 Hydromorphone HCl


  (Dilaudid)  1 mg  PRN Q4HRS  PRN  5/4/20 19:00


 5/18/20 17:10 DC 5/18/20 06:25


1 MG


 


 Info


  (CONTRAST GIVEN


 -- Rx MONITORING)  1 each  PRN DAILY  PRN  3/30/20 11:45


 4/1/20 11:44 DC  





 


 Info


  (Icu Electrolyte


 Protocol)  1 ea  CONT PRN  PRN  3/29/20 13:15


     





 


 Info


  (PHARMACY


 MONITORING -- do


 not chart)  1 each  PRN DAILY  PRN  4/24/20 15:45


     





 


 Info


  (Tpn Per


 Pharmacy)  1 each  PRN DAILY  PRN  3/18/20 12:30


   UNV  





 


 Insulin Human


 Lispro


  (HumaLOG)  0-9 UNITS  Q6HRS  3/16/20 09:30


    5/26/20 06:02


4 UNITS


 


 Insulin Human


 Regular


  (HumuLIN R VIAL)  5 unit  1X  ONCE  3/17/20 22:30


 3/17/20 22:31 DC 3/17/20 22:14


5 UNIT


 


 Iohexol


  (Omnipaque 240


 Mg/ml)  30 ml  1X  ONCE  3/30/20 11:30


 3/30/20 11:33 DC 3/30/20 11:30


30 ML


 


 Iohexol


  (Omnipaque 300


 Mg/ml)  50 ml  STK-MED ONCE  4/27/20 10:58


 4/27/20 10:59 DC  





 


 Iohexol


  (Omnipaque 350


 Mg/ml)  90 ml  1X  ONCE  3/16/20 03:30


 3/16/20 03:31 DC 3/16/20 03:25


90 ML


 


 Ketorolac


 Tromethamine


  (Toradol 30mg


 Vial)  30 mg  1X  ONCE  3/16/20 03:00


 3/16/20 03:01 DC 3/16/20 02:54


30 MG


 


 Lidocaine HCl


  (Buffered


 Lidocaine 1%)  6 ml  1X  ONCE  5/12/20 14:15


 5/12/20 14:16 DC 5/12/20 14:15


3 ML


 


 Lidocaine HCl


  (Glydo


  (Lidocaine) Jelly)  1 thomas  1X  ONCE  3/20/20 14:30


 3/20/20 14:31 DC 3/20/20 16:38


1 THOMAS


 


 Lidocaine HCl


  (Xylocaine-Mpf


 1% 2ml Vial)  2 ml  PRN 1X  PRN  4/27/20 07:00


 4/28/20 06:59 DC  





 


 Linezolid/Dextrose  300 ml @ 


 300 mls/hr  Q12HR  5/17/20 09:00


 5/20/20 08:11 DC 5/19/20 21:08


300 MLS/HR


 


 Lorazepam


  (Ativan Inj)  2 mg  PRN Q4HRS  PRN  5/17/20 19:15


    5/18/20 22:20


2 MG


 


 Magnesium Sulfate  50 ml @ 25


 mls/hr  1X  ONCE  5/10/20 09:00


 5/10/20 10:59 DC 5/10/20 10:44


25 MLS/HR


 


 Meropenem 1 gm/


 Sodium Chloride  100 ml @ 


 200 mls/hr  Q8HRS  4/28/20 14:00


 5/22/20 09:31 DC 5/22/20 05:53


200 MLS/HR


 


 Meropenem 500 mg/


 Sodium Chloride  50 ml @ 


 100 mls/hr  Q6HRS  5/25/20 18:00


    5/26/20 06:01


100 MLS/HR


 


 Metoclopramide HCl


  (Reglan Vial)  10 mg  PRN Q3HRS  PRN  5/9/20 16:45


    5/14/20 04:25


10 MG


 


 Metoprolol


 Tartrate


  (Lopressor Vial)  5 mg  1X  ONCE  5/17/20 15:15


 5/17/20 15:16 DC 5/17/20 15:31


5 MG


 


 Metronidazole  100 ml @ 


 100 mls/hr  Q8HRS  4/14/20 10:00


 4/21/20 08:10 DC 4/21/20 06:04


100 MLS/HR


 


 Micafungin Sodium


 100 mg/Dextrose  100 ml @ 


 100 mls/hr  Q24H  5/4/20 11:00


    5/25/20 11:11


100 MLS/HR


 


 Midazolam HCl


  (Versed)  5 mg  1X  ONCE  5/8/20 09:15


 5/8/20 09:16 DC 5/8/20 09:30


1 MG


 


 Midazolam HCl 100


 mg/Sodium Chloride  100 ml @ 7


 mls/hr  CONT  PRN  3/28/20 16:00


    4/8/20 15:35


7 MLS/HR


 


 Midazolam HCl 50


 mg/Sodium Chloride  50 ml @ 0


 mls/hr  CONT  PRN  3/23/20 08:15


 3/28/20 15:59 DC 3/26/20 22:39


7 MLS/HR


 


 Morphine Sulfate


  (Morphine


 Sulfate)  2 mg  PRN Q2HR  PRN  3/16/20 05:00


 3/17/20 14:15 DC 3/17/20 12:26


2 MG


 


 Multi-Ingred


 Cream/Lotion/Oil/


 Oint


  (Artificial


 Tears Eye


 Ointment)  1 thomas  PRN Q1HR  PRN  3/25/20 17:30


    4/13/20 08:19


1 THOMAS


 


 Naloxone HCl


  (Narcan)  0.4 mg  PRN Q2MIN  PRN  4/27/20 14:30


   UNV  





 


 Norepinephrine


 Bitartrate 8 mg/


 Dextrose  258 ml @ 


 17.299 mls/


 hr  CONT  PRN  3/17/20 15:30


 4/17/20 09:19 DC 4/14/20 12:48


20.9 MLS/HR


 


 Ondansetron HCl


  (Zofran)  4 mg  STK-MED ONCE  4/27/20 10:56


 4/27/20 10:57 DC  





 


 Pantoprazole


 Sodium


  (PROTONIX VIAL


 for IV PUSH)  40 mg  DAILYAC  3/16/20 11:30


    5/26/20 08:01


40 MG


 


 Phenylephrine HCl


  (PHENYLEPHRINE


 in 0.9% NACL PF)  1 mg  STK-MED ONCE  4/27/20 12:34


 4/27/20 12:34 DC  





 


 Piperacillin Sod/


 Tazobactam Sod


 4.5 gm/Sodium


 Chloride  100 ml @ 


 200 mls/hr  1X  ONCE  3/16/20 06:00


 3/16/20 06:29 DC 3/16/20 05:44


200 MLS/HR


 


 Potassium


 Chloride 110 meq/


 Magnesium Sulfate


 20 meq/


 Multivitamins 10


 ml/Chromium/


 Copper/Manganese/


 Seleni/Zn 1 ml/


 Insulin Human


 Regular 15 unit/


 Total Parenteral


 Nutrition/Amino


 Acids/Dextrose/


 Fat Emulsion


 Intravenous  1,800 ml @ 


 75 mls/hr  TPN  CONT  5/24/20 22:00


 5/25/20 21:59 DC 5/24/20 22:48


75 MLS/HR


 


 Potassium


 Chloride 15 meq/


 Bicarbonate


 Dialysis Soln w/


 out KCl  5,007.5 ml


  @ 1,000 mls/


 hr  Q5H1M  3/29/20 20:00


 4/2/20 13:08 DC 4/1/20 18:14


1,000 MLS/HR


 


 Potassium


 Chloride 20 meq/


 Bicarbonate


 Dialysis Soln w/


 out KCl  5,010 ml @ 


 1,000 mls/hr  Q5H1M  3/25/20 16:00


 3/29/20 19:59 DC 3/29/20 14:54


1,000 MLS/HR


 


 Potassium


 Chloride 70 meq/


 Magnesium Sulfate


 20 meq/


 Multivitamins 10


 ml/Chromium/


 Copper/Manganese/


 Seleni/Zn 1 ml/


 Insulin Human


 Regular 15 unit/


 Total Parenteral


 Nutrition/Amino


 Acids/Dextrose/


 Fat Emulsion


 Intravenous  1,800 ml @ 


 75 mls/hr  TPN  CONT  5/25/20 22:00


 5/26/20 21:59  5/25/20 21:39


75 MLS/HR


 


 Potassium


 Chloride 75 meq/


 Magnesium Sulfate


 15 meq/


 Multivitamins 10


 ml/Chromium/


 Copper/Manganese/


 Seleni/Zn 0.5 ml/


 Insulin Human


 Regular 15 unit/


 Total Parenteral


 Nutrition/Amino


 Acids/Dextrose/


 Fat Emulsion


 Intravenous  1,920 ml @ 


 80 mls/hr  TPN  CONT  5/9/20 22:00


 5/10/20 21:59 DC 5/9/20 22:41


80 MLS/HR


 


 Potassium


 Chloride 75 meq/


 Magnesium Sulfate


 15 meq/Calcium


 Gluconate 8 meq/


 Multivitamins 10


 ml/Chromium/


 Copper/Manganese/


 Seleni/Zn 0.5 ml/


 Insulin Human


 Regular 15 unit/


 Total Parenteral


 Nutrition/Amino


 Acids/Dextrose/


 Fat Emulsion


 Intravenous  1,920 ml @ 


 80 mls/hr  TPN  CONT  5/7/20 22:00


 5/8/20 21:59 DC 5/7/20 22:28


80 MLS/HR


 


 Potassium


 Chloride 75 meq/


 Magnesium Sulfate


 15 meq/Calcium


 Gluconate 8 meq/


 Multivitamins 10


 ml/Chromium/


 Copper/Manganese/


 Seleni/Zn 0.5 ml/


 Insulin Human


 Regular 20 unit/


 Total Parenteral


 Nutrition/Amino


 Acids/Dextrose/


 Fat Emulsion


 Intravenous  1,920 ml @ 


 80 mls/hr  TPN  CONT  5/6/20 22:00


 5/7/20 21:59 DC 5/6/20 22:00


80 MLS/HR


 


 Potassium


 Chloride 75 meq/


 Magnesium Sulfate


 15 meq/Calcium


 Gluconate 8 meq/


 Multivitamins 10


 ml/Chromium/


 Copper/Manganese/


 Seleni/Zn 0.5 ml/


 Insulin Human


 Regular 25 unit/


 Total Parenteral


 Nutrition/Amino


 Acids/Dextrose/


 Fat Emulsion


 Intravenous  1,920 ml @ 


 80 mls/hr  TPN  CONT  5/4/20 22:00


 5/5/20 21:59 DC 5/4/20 23:08


80 MLS/HR


 


 Potassium


 Chloride 75 meq/


 Magnesium Sulfate


 20 meq/Calcium


 Gluconate 10 meq/


 Multivitamins 10


 ml/Chromium/


 Copper/Manganese/


 Seleni/Zn 0.5 ml/


 Insulin Human


 Regular 25 unit/


 Total Parenteral


 Nutrition/Amino


 Acids/Dextrose/


 Fat Emulsion


 Intravenous  1,920 ml @ 


 80 mls/hr  TPN  CONT  5/3/20 22:00


 5/4/20 21:59 DC 5/3/20 22:04


80 MLS/HR


 


 Potassium


 Chloride 75 meq/


 Magnesium Sulfate


 20 meq/Calcium


 Gluconate 10 meq/


 Multivitamins 10


 ml/Chromium/


 Copper/Manganese/


 Seleni/Zn 0.5 ml/


 Insulin Human


 Regular 30 unit/


 Total Parenteral


 Nutrition/Amino


 Acids/Dextrose/


 Fat Emulsion


 Intravenous  1,920 ml @ 


 80 mls/hr  TPN  CONT  5/2/20 22:00


 5/3/20 22:00 DC 5/2/20 21:51


80 MLS/HR


 


 Potassium


 Chloride 80 meq/


 Magnesium Sulfate


 20 meq/


 Multivitamins 10


 ml/Chromium/


 Copper/Manganese/


 Seleni/Zn 0.5 ml/


 Insulin Human


 Regular 15 unit/


 Total Parenteral


 Nutrition/Amino


 Acids/Dextrose/


 Fat Emulsion


 Intravenous  1,920 ml @ 


 80 mls/hr  TPN  CONT  5/12/20 22:00


 5/13/20 21:59 DC 5/12/20 21:40


80 MLS/HR


 


 Potassium


 Chloride 80 meq/


 Magnesium Sulfate


 20 meq/


 Multivitamins 10


 ml/Chromium/


 Copper/Manganese/


 Seleni/Zn 1 ml/


 Insulin Human


 Regular 15 unit/


 Total Parenteral


 Nutrition/Amino


 Acids/Dextrose/


 Fat Emulsion


 Intravenous  1,920 ml @ 


 80 mls/hr  TPN  CONT  5/13/20 22:00


 5/14/20 21:59 DC 5/13/20 22:04


80 MLS/HR


 


 Potassium


 Chloride 90 meq/


 Magnesium Sulfate


 20 meq/


 Multivitamins 10


 ml/Chromium/


 Copper/Manganese/


 Seleni/Zn 1 ml/


 Insulin Human


 Regular 15 unit/


 Total Parenteral


 Nutrition/Amino


 Acids/Dextrose/


 Fat Emulsion


 Intravenous  1,800 ml @ 


 75 mls/hr  TPN  CONT  5/20/20 22:00


 5/21/20 21:59 DC 5/20/20 22:28


75 MLS/HR


 


 Potassium


 Chloride/Water  100 ml @ 


 100 mls/hr  1X  ONCE  5/14/20 11:00


 5/14/20 11:59 DC 5/14/20 11:34


100 MLS/HR


 


 Potassium


 Phosphate 20 mmol/


 Sodium Chloride  106.6667


 ml @ 


 51.667 m...  1X  ONCE  3/25/20 13:00


 3/25/20 15:03 DC 3/25/20 12:51


51.667 MLS/HR


 


 Potassium Acetate


 30 meq/Magnesium


 Sulfate 20 meq/


 Calcium Gluconate


 10 meq/


 Multivitamins 10


 ml/Chromium/


 Copper/Manganese/


 Seleni/Zn 0.5 ml/


 Insulin Human


 Regular 30 unit/


 Potassium


 Chloride 30 meq/


 Total Parenteral


 Nutrition/Amino


 Acids/Dextrose/


 Fat Emulsion


 Intravenous  1,920 ml @ 


 80 mls/hr  TPN  CONT  5/1/20 22:00


 5/2/20 21:59 DC 5/1/20 22:34


80 MLS/HR


 


 Potassium Acetate


 55 meq/Magnesium


 Sulfate 20 meq/


 Calcium Gluconate


 10 meq/


 Multivitamins 10


 ml/Chromium/


 Copper/Manganese/


 Seleni/Zn 0.5 ml/


 Insulin Human


 Regular 30 unit/


 Total Parenteral


 Nutrition/Amino


 Acids/Dextrose/


 Fat Emulsion


 Intravenous  1,920 ml @ 


 80 mls/hr  TPN  CONT  4/30/20 22:00


 5/1/20 21:59 DC 5/1/20 01:00


80 MLS/HR


 


 Potassium Acetate


 55 meq/Magnesium


 Sulfate 20 meq/


 Calcium Gluconate


 10 meq/


 Multivitamins 10


 ml/Chromium/


 Copper/Manganese/


 Seleni/Zn 0.5 ml/


 Insulin Human


 Regular 35 unit/


 Total Parenteral


 Nutrition/Amino


 Acids/Dextrose/


 Fat Emulsion


 Intravenous  1,920 ml @ 


 80 mls/hr  TPN  CONT  4/28/20 22:00


 4/29/20 21:59 DC 4/28/20 22:02


80 MLS/HR


 


 Potassium Acetate


 65 meq/Magnesium


 Sulfate 20 meq/


 Calcium Gluconate


 10 meq/


 Multivitamins 10


 ml/Chromium/


 Copper/Manganese/


 Seleni/Zn 0.5 ml/


 Insulin Human


 Regular 30 unit/


 Total Parenteral


 Nutrition/Amino


 Acids/Dextrose/


 Fat Emulsion


 Intravenous  1,920 ml @ 


 80 mls/hr  TPN  CONT  4/29/20 22:00


 4/30/20 21:59 DC 4/29/20 22:22


80 MLS/HR


 


 Prochlorperazine


 Edisylate


  (Compazine)  5 mg  PACU PRN  PRN  4/27/20 07:00


 4/28/20 06:59 DC  





 


 Propofol  20 ml @ As


 Directed  STK-MED ONCE  4/27/20 12:26


 4/27/20 12:27 DC  





 


 Ringer's Solution  1,000 ml @ 


 30 mls/hr  Q24H  4/27/20 07:00


 4/27/20 18:59 DC  





 


 Rocuronium Bromide


  (Zemuron)  50 mg  STK-MED ONCE  4/27/20 10:56


 4/27/20 10:57 DC  





 


 Saliva Substitute


  (Biotene


 Moisturizing


 Mouth)  2 spray  PRN Q15MIN  PRN  5/21/20 11:00


     





 


 Sevoflurane


  (Ultane)  60 ml  STK-MED ONCE  4/27/20 12:26


 4/27/20 12:27 DC  





 


 Sodium


 Bicarbonate 50


 meq/Sodium


 Chloride  1,050 ml @ 


 75 mls/hr  Q14H  3/18/20 07:30


 3/23/20 10:28 DC 3/22/20 21:10


75 MLS/HR


 


 Sodium Acetate 50


 meq/Potassium


 Acetate 55 meq/


 Magnesium Sulfate


 20 meq/Calcium


 Gluconate 10 meq/


 Multivitamins 10


 ml/Chromium/


 Copper/Manganese/


 Seleni/Zn 0.5 ml/


 Insulin Human


 Regular 35 unit/


 Total Parenteral


 Nutrition/Amino


 Acids/Dextrose/


 Fat Emulsion


 Intravenous  1,800 ml @ 


 75 mls/hr  TPN  CONT  4/25/20 22:00


 4/26/20 21:59 DC 4/25/20 22:03


75 MLS/HR


 


 Sodium Chloride  1,000 ml @ 


 25 mls/hr  Q24H  4/27/20 14:30


   UNV  





 


 Sodium Chloride


  (Normal Saline


 Flush)  3 ml  QSHIFT  PRN  4/27/20 13:45


     





 


 Sodium Chloride


 90 meq/Calcium


 Gluconate 10 meq/


 Multivitamins 10


 ml/Chromium/


 Copper/Manganese/


 Seleni/Zn 0.5 ml/


 Total Parenteral


 Nutrition/Amino


 Acids/Dextrose/


 Fat Emulsion


 Intravenous  1,512 ml @ 


 63 mls/hr  TPN  CONT  3/18/20 22:00


 3/19/20 21:59 DC 3/18/20 22:06


63 MLS/HR


 


 Sodium Chloride


 90 meq/Calcium


 Gluconate 10 meq/


 Multivitamins 10


 ml/Chromium/


 Copper/Manganese/


 Seleni/Zn 1 ml/


 Total Parenteral


 Nutrition/Amino


 Acids/Dextrose/


 Fat Emulsion


 Intravenous  55.005 ml


  @ 2.292


 mls/hr  TPN  CONT  3/18/20 22:00


 3/18/20 12:33 DC  





 


 Sodium Chloride


 90 meq/Magnesium


 Sulfate 10 meq/


 Calcium Gluconate


 20 meq/


 Multivitamins 10


 ml/Chromium/


 Copper/Manganese/


 Seleni/Zn 0.5 ml/


 Total Parenteral


 Nutrition/Amino


 Acids/Dextrose/


 Fat Emulsion


 Intravenous  1,512 ml @ 


 63 mls/hr  TPN  CONT  3/19/20 22:00


 3/20/20 21:59 DC 3/19/20 22:25


63 MLS/HR


 


 Sodium Chloride


 90 meq/Magnesium


 Sulfate 12 meq/


 Calcium Gluconate


 15 meq/


 Multivitamins 10


 ml/Chromium/


 Copper/Manganese/


 Seleni/Zn 0.5 ml/


 Insulin Human


 Regular 25 unit/


 Total Parenteral


 Nutrition/Amino


 Acids/Dextrose/


 Fat Emulsion


 Intravenous  1,400 ml @ 


 58.333 mls/


 hr  TPN  CONT  4/8/20 22:00


 4/9/20 21:59 DC 4/8/20 21:41


58.333 MLS/HR


 


 Sodium Chloride


 90 meq/Potassium


 Chloride 15 meq/


 Magnesium Sulfate


 12 meq/Calcium


 Gluconate 15 meq/


 Multivitamins 10


 ml/Chromium/


 Copper/Manganese/


 Seleni/Zn 0.5 ml/


 Insulin Human


 Regular 25 unit/


 Total Parenteral


 Nutrition/Amino


 Acids/Dextrose/


 Fat Emulsion


 Intravenous  1,400 ml @ 


 58.333 mls/


 hr  TPN  CONT  4/7/20 22:00


 4/8/20 21:59 DC 4/7/20 22:13


58.333 MLS/HR


 


 Sodium Chloride


 90 meq/Potassium


 Chloride 15 meq/


 Potassium


 Phosphate 10 mmol/


 Magnesium Sulfate


 8 meq/Calcium


 Gluconate 15 meq/


 Multivitamins 10


 ml/Chromium/


 Copper/Manganese/


 Seleni/Zn 0.5 ml/


 Insulin Human


 Regular 25 unit/


 Total Parenteral


 Nutrition/Amino


 Acids/Dextrose/


 Fat Emulsion


 Intravenous  1,400 ml @ 


 58.333 mls/


 hr  TPN  CONT  4/5/20 22:00


 4/6/20 21:59 DC 4/5/20 21:20


58.333 MLS/HR


 


 Sodium Chloride


 90 meq/Potassium


 Chloride 15 meq/


 Potassium


 Phosphate 10 mmol/


 Magnesium Sulfate


 10 meq/Calcium


 Gluconate 20 meq/


 Multivitamins 10


 ml/Chromium/


 Copper/Manganese/


 Seleni/Zn 0.5 ml/


 Total Parenteral


 Nutrition/Amino


 Acids/Dextrose/


 Fat Emulsion


 Intravenous  1,400 ml @ 


 58.333 mls/


 hr  TPN  CONT  3/23/20 22:00


 3/24/20 21:59 DC 3/23/20 21:42


58.333 MLS/HR


 


 Sodium Chloride


 90 meq/Potassium


 Chloride 15 meq/


 Potassium


 Phosphate 10 mmol/


 Magnesium Sulfate


 12 meq/Calcium


 Gluconate 15 meq/


 Multivitamins 10


 ml/Chromium/


 Copper/Manganese/


 Seleni/Zn 0.5 ml/


 Insulin Human


 Regular 25 unit/


 Total Parenteral


 Nutrition/Amino


 Acids/Dextrose/


 Fat Emulsion


 Intravenous  1,400 ml @ 


 58.333 mls/


 hr  TPN  CONT  4/6/20 22:00


 4/7/20 21:59 DC 4/6/20 22:24


58.333 MLS/HR


 


 Sodium Chloride


 90 meq/Potassium


 Chloride 15 meq/


 Potassium


 Phosphate 15 mmol/


 Magnesium Sulfate


 10 meq/Calcium


 Gluconate 15 meq/


 Multivitamins 10


 ml/Chromium/


 Copper/Manganese/


 Seleni/Zn 0.5 ml/


 Total Parenteral


 Nutrition/Amino


 Acids/Dextrose/


 Fat Emulsion


 Intravenous  1,400 ml @ 


 58.333 mls/


 hr  TPN  CONT  3/24/20 22:00


 3/25/20 21:59 DC 3/24/20 22:17


58.333 MLS/HR


 


 Sodium Chloride


 90 meq/Potassium


 Chloride 15 meq/


 Potassium


 Phosphate 15 mmol/


 Magnesium Sulfate


 10 meq/Calcium


 Gluconate 20 meq/


 Multivitamins 10


 ml/Chromium/


 Copper/Manganese/


 Seleni/Zn 0.5 ml/


 Total Parenteral


 Nutrition/Amino


 Acids/Dextrose/


 Fat Emulsion


 Intravenous  1,200 ml @ 


 50 mls/hr  TPN  CONT  3/22/20 22:00


 3/22/20 14:17 DC  





 


 Sodium Chloride


 90 meq/Potassium


 Chloride 15 meq/


 Potassium


 Phosphate 18 mmol/


 Magnesium Sulfate


 8 meq/Calcium


 Gluconate 15 meq/


 Multivitamins 10


 ml/Chromium/


 Copper/Manganese/


 Seleni/Zn 0.5 ml/


 Insulin Human


 Regular 10 unit/


 Total Parenteral


 Nutrition/Amino


 Acids/Dextrose/


 Fat Emulsion


 Intravenous  1,400 ml @ 


 58.333 mls/


 hr  TPN  CONT  3/27/20 22:00


 3/28/20 21:59 DC 3/27/20 21:43


58.333 MLS/HR


 


 Sodium Chloride


 90 meq/Potassium


 Chloride 15 meq/


 Potassium


 Phosphate 18 mmol/


 Magnesium Sulfate


 8 meq/Calcium


 Gluconate 15 meq/


 Multivitamins 10


 ml/Chromium/


 Copper/Manganese/


 Seleni/Zn 0.5 ml/


 Insulin Human


 Regular 15 unit/


 Total Parenteral


 Nutrition/Amino


 Acids/Dextrose/


 Fat Emulsion


 Intravenous  1,400 ml @ 


 58.333 mls/


 hr  TPN  CONT  3/30/20 22:00


 3/31/20 21:59 DC 3/30/20 21:47


58.333 MLS/HR


 


 Sodium Chloride


 90 meq/Potassium


 Chloride 15 meq/


 Potassium


 Phosphate 18 mmol/


 Magnesium Sulfate


 8 meq/Calcium


 Gluconate 15 meq/


 Multivitamins 10


 ml/Chromium/


 Copper/Manganese/


 Seleni/Zn 0.5 ml/


 Insulin Human


 Regular 20 unit/


 Total Parenteral


 Nutrition/Amino


 Acids/Dextrose/


 Fat Emulsion


 Intravenous  1,400 ml @ 


 58.333 mls/


 hr  TPN  CONT  4/2/20 22:00


 4/3/20 21:59 DC 4/2/20 22:45


58.333 MLS/HR


 


 Sodium Chloride


 90 meq/Potassium


 Chloride 15 meq/


 Potassium


 Phosphate 18 mmol/


 Magnesium Sulfate


 8 meq/Calcium


 Gluconate 15 meq/


 Multivitamins 10


 ml/Chromium/


 Copper/Manganese/


 Seleni/Zn 0.5 ml/


 Total Parenteral


 Nutrition/Amino


 Acids/Dextrose/


 Fat Emulsion


 Intravenous  1,400 ml @ 


 58.333 mls/


 hr  TPN  CONT  3/26/20 22:00


 3/27/20 21:59 DC 3/26/20 22:00


58.333 MLS/HR


 


 Sodium Chloride


 90 meq/Potassium


 Phosphate 15 mmol/


 Magnesium Sulfate


 12 meq/Calcium


 Gluconate 15 meq/


 Multivitamins 10


 ml/Chromium/


 Copper/Manganese/


 Seleni/Zn 0.5 ml/


 Insulin Human


 Regular 30 unit/


 Total Parenteral


 Nutrition/Amino


 Acids/Dextrose/


 Fat Emulsion


 Intravenous  1,400 ml @ 


 58.333 mls/


 hr  TPN  CONT  4/10/20 22:00


 4/11/20 21:59 DC 4/10/20 21:49


58.333 MLS/HR


 


 Sodium Chloride


 90 meq/Potassium


 Phosphate 15 mmol/


 Magnesium Sulfate


 12 meq/Calcium


 Gluconate 15 meq/


 Multivitamins 10


 ml/Chromium/


 Copper/Manganese/


 Seleni/Zn 0.5 ml/


 Insulin Human


 Regular 40 unit/


 Total Parenteral


 Nutrition/Amino


 Acids/Dextrose/


 Fat Emulsion


 Intravenous  1,400 ml @ 


 58.333 mls/


 hr  TPN  CONT  4/11/20 22:00


 4/12/20 21:59 DC 4/11/20 21:21


58.333 MLS/HR


 


 Sodium Chloride


 90 meq/Potassium


 Phosphate 19 mmol/


 Magnesium Sulfate


 12 meq/Calcium


 Gluconate 15 meq/


 Multivitamins 10


 ml/Chromium/


 Copper/Manganese/


 Seleni/Zn 0.5 ml/


 Insulin Human


 Regular 40 unit/


 Total Parenteral


 Nutrition/Amino


 Acids/Dextrose/


 Fat Emulsion


 Intravenous  1,400 ml @ 


 58.333 mls/


 hr  TPN  CONT  4/12/20 22:00


 4/13/20 21:59 DC 4/12/20 21:54


58.333 MLS/HR


 


 Sodium Chloride


 90 meq/Potassium


 Phosphate 5 mmol/


 Magnesium Sulfate


 12 meq/Calcium


 Gluconate 15 meq/


 Multivitamins 10


 ml/Chromium/


 Copper/Manganese/


 Seleni/Zn 0.5 ml/


 Insulin Human


 Regular 30 unit/


 Total Parenteral


 Nutrition/Amino


 Acids/Dextrose/


 Fat Emulsion


 Intravenous  1,400 ml @ 


 58.333 mls/


 hr  TPN  CONT  4/9/20 22:00


 4/10/20 21:59 DC 4/9/20 22:08


58.333 MLS/HR


 


 Sodium Chloride


 100 meq/Potassium


 Chloride 40 meq/


 Magnesium Sulfate


 15 meq/Calcium


 Gluconate 15 meq/


 Multivitamins 10


 ml/Chromium/


 Copper/Manganese/


 Seleni/Zn 0.5 ml/


 Insulin Human


 Regular 35 unit/


 Total Parenteral


 Nutrition/Amino


 Acids/Dextrose/


 Fat Emulsion


 Intravenous  1,400 ml @ 


 58.333 mls/


 hr  TPN  CONT  4/19/20 22:00


 4/20/20 21:59 DC 4/19/20 22:46


58.333 MLS/HR


 


 Sodium Chloride


 100 meq/Potassium


 Chloride 40 meq/


 Magnesium Sulfate


 20 meq/Calcium


 Gluconate 10 meq/


 Multivitamins 10


 ml/Chromium/


 Copper/Manganese/


 Seleni/Zn 0.5 ml/


 Insulin Human


 Regular 35 unit/


 Total Parenteral


 Nutrition/Amino


 Acids/Dextrose/


 Fat Emulsion


 Intravenous  1,400 ml @ 


 58.333 mls/


 hr  TPN  CONT  4/23/20 22:00


 4/24/20 21:59 DC 4/24/20 00:06


58.333 MLS/HR


 


 Sodium Chloride


 100 meq/Potassium


 Chloride 40 meq/


 Magnesium Sulfate


 20 meq/Calcium


 Gluconate 15 meq/


 Multivitamins 10


 ml/Chromium/


 Copper/Manganese/


 Seleni/Zn 0.5 ml/


 Insulin Human


 Regular 35 unit/


 Total Parenteral


 Nutrition/Amino


 Acids/Dextrose/


 Fat Emulsion


 Intravenous  1,400 ml @ 


 58.333 mls/


 hr  TPN  CONT  4/22/20 22:00


 4/23/20 21:59 DC 4/22/20 22:27


58.333 MLS/HR


 


 Sodium Chloride


 100 meq/Potassium


 Phosphate 10 mmol/


 Magnesium Sulfate


 12 meq/Calcium


 Gluconate 15 meq/


 Multivitamins 10


 ml/Chromium/


 Copper/Manganese/


 Seleni/Zn 0.5 ml/


 Insulin Human


 Regular 35 unit/


 Potassium


 Chloride 20 meq/


 Total Parenteral


 Nutrition/Amino


 Acids/Dextrose/


 Fat Emulsion


 Intravenous  1,400 ml @ 


 58.333 mls/


 hr  TPN  CONT  4/16/20 22:00


 4/17/20 21:59 DC 4/16/20 22:10


58.333 MLS/HR


 


 Sodium Chloride


 100 meq/Potassium


 Phosphate 19 mmol/


 Magnesium Sulfate


 12 meq/Calcium


 Gluconate 15 meq/


 Multivitamins 10


 ml/Chromium/


 Copper/Manganese/


 Seleni/Zn 0.5 ml/


 Insulin Human


 Regular 40 unit/


 Potassium


 Chloride 20 meq/


 Total Parenteral


 Nutrition/Amino


 Acids/Dextrose/


 Fat Emulsion


 Intravenous  1,400 ml @ 


 58.333 mls/


 hr  TPN  CONT  4/15/20 22:00


 4/16/20 21:59 DC 4/15/20 21:20


58.333 MLS/HR


 


 Sodium Chloride


 100 meq/Potassium


 Phosphate 5 mmol/


 Magnesium Sulfate


 12 meq/Calcium


 Gluconate 15 meq/


 Multivitamins 10


 ml/Chromium/


 Copper/Manganese/


 Seleni/Zn 0.5 ml/


 Insulin Human


 Regular 35 unit/


 Potassium


 Chloride 20 meq/


 Total Parenteral


 Nutrition/Amino


 Acids/Dextrose/


 Fat Emulsion


 Intravenous  1,400 ml @ 


 58.333 mls/


 hr  TPN  CONT  4/17/20 22:00


 4/18/20 21:59 DC 4/17/20 22:59


58.333 MLS/HR


 


 Succinylcholine


 Chloride


  (Anectine)  120 mg  1X  ONCE  3/23/20 08:30


 3/23/20 08:31 DC 3/23/20 08:34


120 MG


 


 Vecuronium Bromide


  (Norcuron Bolus)  6 mg  PRN Q6HRS  PRN  5/7/20 19:15


 5/7/20 19:35 DC  














Labs:


Lab





Laboratory Tests








Test


 5/25/20


17:46 5/25/20


23:55 5/26/20


05:45 5/26/20


06:00


 


Glucose (Fingerstick)


 173 mg/dL


(70-99) 166 mg/dL


(70-99) 


 170 mg/dL


(70-99)


 


White Blood Count


 


 


 11.4 x10^3/uL


(4.0-11.0) 





 


Red Blood Count


 


 


 2.80 x10^6/uL


(3.50-5.40) 





 


Hemoglobin


 


 


 8.0 g/dL


(12.0-15.5) 





 


Hematocrit


 


 


 24.4 %


(36.0-47.0) 





 


Mean Corpuscular Volume   87 fL ()  


 


Mean Corpuscular Hemoglobin   28 pg (25-35)  


 


Mean Corpuscular Hemoglobin


Concent 


 


 33 g/dL


(31-37) 





 


Red Cell Distribution Width


 


 


 19.5 %


(11.5-14.5) 





 


Platelet Count


 


 


 325 x10^3/uL


(140-400) 





 


Neutrophils (%) (Auto)   72 % (31-73)  


 


Lymphocytes (%) (Auto)   17 % (24-48)  


 


Monocytes (%) (Auto)   9 % (0-9)  


 


Eosinophils (%) (Auto)   2 % (0-3)  


 


Basophils (%) (Auto)   0 % (0-3)  


 


Neutrophils # (Auto)


 


 


 8.2 x10^3/uL


(1.8-7.7) 





 


Lymphocytes # (Auto)


 


 


 2.0 x10^3/uL


(1.0-4.8) 





 


Monocytes # (Auto)


 


 


 1.0 x10^3/uL


(0.0-1.1) 





 


Eosinophils # (Auto)


 


 


 0.2 x10^3/uL


(0.0-0.7) 





 


Basophils # (Auto)


 


 


 0.0 x10^3/uL


(0.0-0.2) 





 


Sodium Level


 


 


 134 mmol/L


(136-145) 





 


Potassium Level


 


 


 4.6 mmol/L


(3.5-5.1) 





 


Chloride Level


 


 


 97 mmol/L


() 





 


Carbon Dioxide Level


 


 


 33 mmol/L


(21-32) 





 


Anion Gap   4 (6-14)  


 


Blood Urea Nitrogen


 


 


 28 mg/dL


(7-20) 





 


Creatinine


 


 


 0.9 mg/dL


(0.6-1.0) 





 


Estimated GFR


(Cockcroft-Gault) 


 


 66.5 


 





 


Glucose Level


 


 


 183 mg/dL


(70-99) 





 


Calcium Level


 


 


 10.0 mg/dL


(8.5-10.1) 














Objective:


Assessment:


Fever intermittent could be from underlying pancreatitis,  


Acute pancreatitis with persistent  necrosis


CT a/p 4/9


    Increased ascites. Persistent evidence of necrotizing pancreatitis with 

fluid and phlegmon


   at the pancreas


   4/27 status post ROBERT drain placement; yeast


   5/6 fluid  candida parapsilosis fluid amylase high


Cholelithiasis with thickening of the gallbladder wall.


Leucocytosis 


JED,Hyperkalemia, Metabolic acidosis off dialysis


Acute hypoxic resp failure ,bilateral pleural effusion and atelectasis


hypocalcemia 


Prediabetes


HTN


s/p trach





Plan:


Plan of Care


cont merrem restarted May 25


cont  Micafungin, 5/4


    Off dapto/zyvox/merrem


Maintain aspiration precaution


Supportive care





Critically ill











IVAN FRANZ MD           May 26, 2020 08:35

## 2020-05-26 NOTE — PDOC
[facility] PROGRESS NOTE


Chief Complaint


Chief Complaint


Acute hypoxic Respiratory failure requiring mechanical ventilation (now 

extubated for several days but still with tracheostomy)


Tracheostomy


bilateral pleural effusions/pulm edema 


Sepsis


Severe Acute gallstone pancreatitis (not a surgical candidate at this time) with

necrosis


Acute kidney failure now requiring dialysis


Salpingitis


Gallstones (Calculus of gallbladder with acute cholecystitis without obstruc

tion)


HTN 


Leukocytosis 


Hypoxia


Uterine fibroid


Intractable pain


Intractable nausea


Covid 19 negative. 


Acute on chronic anemia 


EEG: No seizure activityFever  - better currently - intermittent could be from 

underlying pancreatitis blood cults 5/4 - neg so far


? Ileus with vomiting


Abd distention - U/S and CT reviewed s/p 0.4 L of opaque, debris-containing 

ascites was removed 5/6


Acute pancreatitis with persistent necrosis


- 4/27 status post ROBERT drain placement + C paropsilosis. s/p additional drains 

5/8


Anemia - S/p PRBCs


Cholelithiasis with thickening of the gallbladder wall.


Leucocytosis improving


JED, hyperkalemia, Metabolic acidosis off dialysis


Acute hypoxic resp failure ,bilateral pleural effusion and atelectasis


hypocalcemia 


Prediabetes


HTN


s/p trach


ESRD on HD


Hyperglycemia





History of Present Illness


History of Present Illness


5/26/2020


Patient seen and examined in the ICU


She is up in the chair very frail trying to talk a little shaky


Discussed with RN


Chart reviewed








5/25/2020


Patient seen and examined in the ICU


Patient up in the chair


Having a severe coughing episode with a lot of phlegm coming out of her 

tracheostomy


Discussed with RN


Discussed with physical therapy


Chart reviewed











5/24,.    feels well,  has been out of room in wheelchair


no complaint,    still weak,   some with not wanting to wear her valve on trach


cont other,   may be able to work with speech tomorrow,    kaliwallow OK to 

try, 








5/23,  anxiety is up today,   she dislikes the valve still,    


shower and outside today


.   





5/22 she doesnt want to wear her passy-kristan valve,  discussed str and plan with 

her.


speech following,  needs swallow study,   but needs to wear her valve longer,  


cont current


able to walk some, walker 





5/21  stronger,   better,   


we discussed better oral care


she would like to try swallow study,  wants to try to eat,    speech is 

following








5/20/2020 


She remains in the ICU


sitting up and working with OT,   getting better


if limit pain meds, may do better off the vent, 





Nurses trying to suction her,  that is also improved, 


Chart reviewed


 








5/19/2020


Patient seen and examined in the ICU


She had an episode yesterday of tachycardia and severe agitation we gave her 

some Ativan


After that she seemed to have stroke symptoms but now that the Ativan has wore 

off her stroke symptoms have resolved


 


 


She is on IV meropenem and daptomycin and micafungin


Chart reviewed


Discussed with RN


Patient is still critically ill


 


BRIEF OPERATIVE NOTE


Pre-Op Diagnosis


Pancreatitis with pseudocysts, suspected infection


Post-Op Diagnosis


same


Procedure Performed


CT abdominal Drains x 3


Surgeon


Hardy


Anesthesia Type:  Conscious Sedation


Findings


3 abdominal drains, 14F, with turbid pancreatic fluid and necrotic debris in 

each.


Complications


No immediate








5/9: Patient today somewhat restless and having bilious secretions from ET tube,

imaging studies ordered, discussed with consultant. Pretty poor prognosis, 

hopefully is not a fistula, poor surgical candidate. 





5/10: Imaging with no acute events, she seems more stable today compared to 

yesterday. Encouraged as much activity as possible patient at high risk for 

severe depression.





Vitals/I&O


Vitals/I&O:





                                   Vital Signs








  Date Time  Temp Pulse Resp B/P (MAP) Pulse Ox O2 Delivery O2 Flow Rate FiO2


 


5/26/20 10:00  125 24 135/68 (90) 99 Tracheal Collar 8.0 


 


5/26/20 08:00 98.5       





 98.5       














                                    I & O   


 


 5/25/20 5/25/20 5/26/20





 15:00 23:00 07:00


 


Intake Total 0 ml 957.66 ml 1089 ml


 


Output Total 1690 ml 415 ml 380 ml


 


Balance -1690 ml 542.66 ml 709 ml











Physical Exam


Physical Exam:


GENERAL: Just got up in the chair having a lot of coughing


HEENT:  Oral cavity clear,  NGT


NECK:  Trach shield


LUNGS: A lot of coughing with productive sputum from the tracheostomy site


HEART:  S1, S2, regular 


ABDOMEN: mod distention, hypoactive BS, tender, + drains x 3


: [place] (4/14)


EXTREMITIES: Generalized edema, improving, no cyanosis, SCDs bilaterally 


SKIN: Warm and dry.  No generalized rash.  


CNS: Very weak 


RUE-PICC (4/30) clean


General:  Alert, No acute distress


Heart:  Regular rate, Normal S1, Normal S2, No murmurs, Gallops


Lungs:  Other (Good air movement but having a lot of productive sputum from her 

tracheostomy site)


Abdomen:  Soft


Extremities:  No clubbing, No cyanosis, No edema, Normal pulses, No 

tenderness/swelling


Skin:  Other (warm, dry)





Labs


Labs:





Laboratory Tests








Test


 5/25/20


17:46 5/25/20


23:55 5/26/20


05:45 5/26/20


06:00


 


Glucose (Fingerstick)


 173 mg/dL


(70-99) 166 mg/dL


(70-99) 


 170 mg/dL


(70-99)


 


White Blood Count


 


 


 11.4 x10^3/uL


(4.0-11.0) 





 


Red Blood Count


 


 


 2.80 x10^6/uL


(3.50-5.40) 





 


Hemoglobin


 


 


 8.0 g/dL


(12.0-15.5) 





 


Hematocrit


 


 


 24.4 %


(36.0-47.0) 





 


Mean Corpuscular Volume   87 fL ()  


 


Mean Corpuscular Hemoglobin   28 pg (25-35)  


 


Mean Corpuscular Hemoglobin


Concent 


 


 33 g/dL


(31-37) 





 


Red Cell Distribution Width


 


 


 19.5 %


(11.5-14.5) 





 


Platelet Count


 


 


 325 x10^3/uL


(140-400) 





 


Neutrophils (%) (Auto)   72 % (31-73)  


 


Lymphocytes (%) (Auto)   17 % (24-48)  


 


Monocytes (%) (Auto)   9 % (0-9)  


 


Eosinophils (%) (Auto)   2 % (0-3)  


 


Basophils (%) (Auto)   0 % (0-3)  


 


Neutrophils # (Auto)


 


 


 8.2 x10^3/uL


(1.8-7.7) 





 


Lymphocytes # (Auto)


 


 


 2.0 x10^3/uL


(1.0-4.8) 





 


Monocytes # (Auto)


 


 


 1.0 x10^3/uL


(0.0-1.1) 





 


Eosinophils # (Auto)


 


 


 0.2 x10^3/uL


(0.0-0.7) 





 


Basophils # (Auto)


 


 


 0.0 x10^3/uL


(0.0-0.2) 





 


Sodium Level


 


 


 134 mmol/L


(136-145) 





 


Potassium Level


 


 


 4.6 mmol/L


(3.5-5.1) 





 


Chloride Level


 


 


 97 mmol/L


() 





 


Carbon Dioxide Level


 


 


 33 mmol/L


(21-32) 





 


Anion Gap   4 (6-14)  


 


Blood Urea Nitrogen


 


 


 28 mg/dL


(7-20) 





 


Creatinine


 


 


 0.9 mg/dL


(0.6-1.0) 





 


Estimated GFR


(Cockcroft-Gault) 


 


 66.5 


 





 


Glucose Level


 


 


 183 mg/dL


(70-99) 





 


Calcium Level


 


 


 10.0 mg/dL


(8.5-10.1) 














Review of Systems


Review of Systems:


Complains of weakness complains of shortness of breath and cough





Assessment and Plan


Assessmemt and Plan


Problems


Medical Problems:


(1) Acute pancreatitis


Status: Acute  





(2) Cholelithiasis


Status: Acute  





Acute hypoxic Respiratory failure requiring mechanical ventilation was initially

intubated on 3/23 was off for couple of days now back on


Severe gallstone pancreatitis (not a surgical candidate at this time) with 

necrosis


Tracheostomy


bilateral pleural effusions/pulm edema 


Severe sepsis


Acute kidney failure now requiring dialysis


Salpingitis


Gallstones (Calculus of gallbladder with acute cholecystitis without 

obstruction)


HTN 


Leukocytosis 


Hypoxia


Uterine fibroid


Intractable pain


Intractable nausea


Covid 19 negative. 


Acute on chronic anemia 


EEG: No seizure activityFever  - better currently - intermittent could be from 

underlying pancreatitis blood cults 5/4 - neg so far


? Ileus with vomiting


Abd distention - U/S and CT reviewed s/p 0.4 L of opaque, debris-containing asci

kareem was removed 5/6


Acute pancreatitis with persistent necrosis


- 4/27 status post ROBERT drain placement + C paropsilosis. s/p additional drains 

5/8


Anemia - S/p PRBCs


Cholelithiasis with thickening of the gallbladder wall.


Leucocytosis improving


JED, hyperkalemia, Metabolic acidosis off dialysis


Acute hypoxic resp failure ,bilateral pleural effusion and atelectasis


hypocalcemia 


Prediabetes


HTN


s/p trach


ESRD on HD


Hyperglycemia








Plan


ICU monitoring


Wound care


Hold off on Ativan for now as she gets too weak with it


Humidified O2 via nasal cannula for now but we can use trach shield as backup


NG suctioning


Nebulizers


Continue IV antibiotics and micafungin


Sedation with Precedex PRN


TPN protocol


Continue [place] to bedside drainage


Tracheostomy care


Hope to eventually move towards decannulation (we have a speaking valve for now)


Trend labs


Appreciate subspecialist input


She is still critically ill


I am still very concerned about her long-term prognosis ! (she scored a 9 on 

[place] criteria 3 weeks ago). 


Total time 34-minute





Comment


Review of Relevant


I have reviewed the following items josy (where applicable) has been applied.


Medications:





Current Medications








 Medications


  (Trade)  Dose


 Ordered  Sig/Yee


 Route


 PRN Reason  Start Time


 Stop Time Status Last Admin


Dose Admin


 


 Potassium


 Chloride 70 meq/


 Magnesium Sulfate


 20 meq/


 Multivitamins 10


 ml/Chromium/


 Copper/Manganese/


 Seleni/Zn 1 ml/


 Insulin Human


 Regular 15 unit/


 Total Parenteral


 Nutrition/Amino


 Acids/Dextrose/


 Fat Emulsion


 Intravenous  1,800 ml @ 


 75 mls/hr  TPN  CONT


 IV


   5/25/20 22:00


 5/26/20 21:59  5/25/20 21:39





 


 Meropenem 500 mg/


 Sodium Chloride  50 ml @ 


 100 mls/hr  Q6HRS


 IV


   5/25/20 18:00


    5/26/20 06:01














Hemodynamically unstable?:  No


Is patient in severe pain?:  No


Is NPO status required?:  Yes











HECTOR MASON III DO           May 26, 2020 10:36

## 2020-05-26 NOTE — PDOC
PROGRESS NOTES


Assessment


Problems


Medical Problems:


(1) Acute pancreatitis


Status: Acute  





(2) Cholelithiasis


Status: Acute  





Respiratory failure.


Single seizure on 3/6, no recurrence.


Metabolic encephalopathy.


Fevers.


Metabolic acidosis.


Diffuse pulmonary infiltrate.


Pleural effusion.


Pancreatitis, necrotizing.


Gallstone.


Leukocytosis.


Lymphopenia.


Electrolytes imbalances.


Hyperglycemia.


DM.


HTN.


HLD.


Anemia.


Abnormal CXR.


Obesity.


Ascites and pleural effusion


Plan


Keppra if she has further seizures.


Treat medical diseases.


Subjective


No complaints


Objective





Vital Signs








  Date Time  Temp Pulse Resp B/P (MAP) Pulse Ox O2 Delivery O2 Flow Rate FiO2


 


5/26/20 07:00  99 15 109/58 (75) 99 Tracheal Collar 8.0 


 


5/26/20 04:00 98.2       





 98.2       














Intake and Output 


 


 5/26/20





 07:00


 


Intake Total 2046.66 ml


 


Output Total 2485 ml


 


Balance -438.34 ml


 


 


 


Intake Oral 0 ml


 


IV Total 2046.66 ml


 


Output Urine Total 2225 ml


 


Drainage Total 260 ml








PHYSICAL EXAM


Alert, follows commands. Tracheostomy shield


PERRL.


EOMI.


CN: no focal findings.


Muscle tone: normal.


Muscle strength: 3-4/5


DTR: 1+


Plantar reflex: Silent


Gait: not examined in bed.


Sensory exam: normal


Cerebellar: normal


Review of Relevant


I have reviewed the following items josy (where applicable) has been applied.


Labs





Laboratory Tests








Test


 5/24/20


11:25 5/24/20


23:39 5/25/20


06:50 5/25/20


06:52


 


White Blood Count


 16.0 x10^3/uL


(4.0-11.0) 


 


 





 


Red Blood Count


 2.94 x10^6/uL


(3.50-5.40) 


 


 





 


Hemoglobin


 8.5 g/dL


(12.0-15.5) 


 


 





 


Hematocrit


 25.5 %


(36.0-47.0) 


 


 





 


Mean Corpuscular Volume 87 fL ()    


 


Mean Corpuscular Hemoglobin 29 pg (25-35)    


 


Mean Corpuscular Hemoglobin


Concent 34 g/dL


(31-37) 


 


 





 


Red Cell Distribution Width


 19.0 %


(11.5-14.5) 


 


 





 


Platelet Count


 317 x10^3/uL


(140-400) 


 


 





 


Sodium Level


 136 mmol/L


(136-145) 


 135 mmol/L


(136-145) 





 


Potassium Level


 4.8 mmol/L


(3.5-5.1) 


 5.0 mmol/L


(3.5-5.1) 





 


Chloride Level


 98 mmol/L


() 


 97 mmol/L


() 





 


Carbon Dioxide Level


 34 mmol/L


(21-32) 


 34 mmol/L


(21-32) 





 


Anion Gap 4 (6-14)   4 (6-14)  


 


Blood Urea Nitrogen


 23 mg/dL


(7-20) 


 24 mg/dL


(7-20) 





 


Creatinine


 0.8 mg/dL


(0.6-1.0) 


 0.8 mg/dL


(0.6-1.0) 





 


Estimated GFR


(Cockcroft-Gault) 76.2 


 


 76.2 


 





 


BUN/Creatinine Ratio 29 (6-20)   30 (6-20)  


 


Glucose Level


 131 mg/dL


(70-99) 


 140 mg/dL


(70-99) 





 


Calcium Level


 10.1 mg/dL


(8.5-10.1) 


 10.0 mg/dL


(8.5-10.1) 





 


Total Bilirubin


 0.7 mg/dL


(0.2-1.0) 


 0.7 mg/dL


(0.2-1.0) 





 


Aspartate Amino Transf


(AST/SGOT) 34 U/L (15-37) 


 


 26 U/L (15-37) 


 





 


Alanine Aminotransferase


(ALT/SGPT) 25 U/L (14-59) 


 


 25 U/L (14-59) 


 





 


Alkaline Phosphatase


 131 U/L


() 


 119 U/L


() 





 


Total Protein


 6.9 g/dL


(6.4-8.2) 


 6.9 g/dL


(6.4-8.2) 





 


Albumin


 2.4 g/dL


(3.4-5.0) 


 2.3 g/dL


(3.4-5.0) 





 


Albumin/Globulin Ratio 0.5 (1.0-1.7)   0.5 (1.0-1.7)  


 


Glucose (Fingerstick)


 


 161 mg/dL


(70-99) 


 135 mg/dL


(70-99)


 


Phosphorus Level


 


 


 5.2 mg/dL


(2.6-4.7) 





 


Magnesium Level


 


 


 2.3 mg/dL


(1.8-2.4) 





 


Triglycerides Level


 


 


 217 mg/dL


(0-150) 





 


Test


 5/25/20


17:46 5/25/20


23:55 5/26/20


05:45 5/26/20


06:00


 


Glucose (Fingerstick)


 173 mg/dL


(70-99) 166 mg/dL


(70-99) 


 170 mg/dL


(70-99)


 


White Blood Count


 


 


 11.4 x10^3/uL


(4.0-11.0) 





 


Red Blood Count


 


 


 2.80 x10^6/uL


(3.50-5.40) 





 


Hemoglobin


 


 


 8.0 g/dL


(12.0-15.5) 





 


Hematocrit


 


 


 24.4 %


(36.0-47.0) 





 


Mean Corpuscular Volume   87 fL ()  


 


Mean Corpuscular Hemoglobin   28 pg (25-35)  


 


Mean Corpuscular Hemoglobin


Concent 


 


 33 g/dL


(31-37) 





 


Red Cell Distribution Width


 


 


 19.5 %


(11.5-14.5) 





 


Platelet Count


 


 


 325 x10^3/uL


(140-400) 





 


Neutrophils (%) (Auto)   72 % (31-73)  


 


Lymphocytes (%) (Auto)   17 % (24-48)  


 


Monocytes (%) (Auto)   9 % (0-9)  


 


Eosinophils (%) (Auto)   2 % (0-3)  


 


Basophils (%) (Auto)   0 % (0-3)  


 


Neutrophils # (Auto)


 


 


 8.2 x10^3/uL


(1.8-7.7) 





 


Lymphocytes # (Auto)


 


 


 2.0 x10^3/uL


(1.0-4.8) 





 


Monocytes # (Auto)


 


 


 1.0 x10^3/uL


(0.0-1.1) 





 


Eosinophils # (Auto)


 


 


 0.2 x10^3/uL


(0.0-0.7) 





 


Basophils # (Auto)


 


 


 0.0 x10^3/uL


(0.0-0.2) 





 


Sodium Level


 


 


 134 mmol/L


(136-145) 





 


Potassium Level


 


 


 4.6 mmol/L


(3.5-5.1) 





 


Chloride Level


 


 


 97 mmol/L


() 





 


Carbon Dioxide Level


 


 


 33 mmol/L


(21-32) 





 


Anion Gap   4 (6-14)  


 


Blood Urea Nitrogen


 


 


 28 mg/dL


(7-20) 





 


Creatinine


 


 


 0.9 mg/dL


(0.6-1.0) 





 


Estimated GFR


(Cockcroft-Gault) 


 


 66.5 


 





 


Glucose Level


 


 


 183 mg/dL


(70-99) 





 


Calcium Level


 


 


 10.0 mg/dL


(8.5-10.1) 











Laboratory Tests








Test


 5/25/20


17:46 5/25/20


23:55 5/26/20


05:45 5/26/20


06:00


 


Glucose (Fingerstick)


 173 mg/dL


(70-99) 166 mg/dL


(70-99) 


 170 mg/dL


(70-99)


 


White Blood Count


 


 


 11.4 x10^3/uL


(4.0-11.0) 





 


Red Blood Count


 


 


 2.80 x10^6/uL


(3.50-5.40) 





 


Hemoglobin


 


 


 8.0 g/dL


(12.0-15.5) 





 


Hematocrit


 


 


 24.4 %


(36.0-47.0) 





 


Mean Corpuscular Volume   87 fL ()  


 


Mean Corpuscular Hemoglobin   28 pg (25-35)  


 


Mean Corpuscular Hemoglobin


Concent 


 


 33 g/dL


(31-37) 





 


Red Cell Distribution Width


 


 


 19.5 %


(11.5-14.5) 





 


Platelet Count


 


 


 325 x10^3/uL


(140-400) 





 


Neutrophils (%) (Auto)   72 % (31-73)  


 


Lymphocytes (%) (Auto)   17 % (24-48)  


 


Monocytes (%) (Auto)   9 % (0-9)  


 


Eosinophils (%) (Auto)   2 % (0-3)  


 


Basophils (%) (Auto)   0 % (0-3)  


 


Neutrophils # (Auto)


 


 


 8.2 x10^3/uL


(1.8-7.7) 





 


Lymphocytes # (Auto)


 


 


 2.0 x10^3/uL


(1.0-4.8) 





 


Monocytes # (Auto)


 


 


 1.0 x10^3/uL


(0.0-1.1) 





 


Eosinophils # (Auto)


 


 


 0.2 x10^3/uL


(0.0-0.7) 





 


Basophils # (Auto)


 


 


 0.0 x10^3/uL


(0.0-0.2) 





 


Sodium Level


 


 


 134 mmol/L


(136-145) 





 


Potassium Level


 


 


 4.6 mmol/L


(3.5-5.1) 





 


Chloride Level


 


 


 97 mmol/L


() 





 


Carbon Dioxide Level


 


 


 33 mmol/L


(21-32) 





 


Anion Gap   4 (6-14)  


 


Blood Urea Nitrogen


 


 


 28 mg/dL


(7-20) 





 


Creatinine


 


 


 0.9 mg/dL


(0.6-1.0) 





 


Estimated GFR


(Cockcroft-Gault) 


 


 66.5 


 





 


Glucose Level


 


 


 183 mg/dL


(70-99) 





 


Calcium Level


 


 


 10.0 mg/dL


(8.5-10.1) 











Microbiology


5/17/20 Blood Culture - Final, Complete


          NO GROWTH AFTER 5 DAYS


5/6/20 Fungal Culture - Final, Complete


         


5/6/20 Fungal Culture Result 1 - Final, Complete


         


4/30/20 Aerobic Culture - Final, Complete


          


4/30/20 Aerobic Culture Result 1 ([facility]) - Final, Complete


          


4/30/20 Gram Stain - Final, Complete


          


4/30/20 Gram Stain Result 1 ([facility]) - Final, Complete


          


4/30/20 Gram Stain Result 2 ([facility]) - Final, Complete


          


4/12/20 Urine Culture - Final, Complete


          


4/12/20 Urine Culture Result 1 ([facility]) - Final, Complete


Medications





Current Medications


Sodium Chloride 1,000 ml @  1,000 mls/hr Q1H IV  Last administered on 3/16/20at 

03:00;  Start 3/16/20 at 03:00;  Stop 3/16/20 at 03:59;  Status DC


Ondansetron HCl (Zofran) 4 mg 1X  ONCE IVP  Last administered on 3/16/20at 

03:27;  Start 3/16/20 at 03:00;  Stop 3/16/20 at 03:01;  Status DC


Morphine Sulfate (Morphine Sulfate) 4 mg 1X  ONCE IV ;  Start 3/16/20 at 03:00; 

Stop 3/16/20 at 03:01;  Status Cancel


Ketorolac Tromethamine (Toradol 30mg Vial) 30 mg 1X  ONCE IV  Last administered 

on 3/16/20at 02:54;  Start 3/16/20 at 03:00;  Stop 3/16/20 at 03:01;  Status DC


Fentanyl Citrate (Fentanyl 2ml Vial) 25 mcg 1X  ONCE IVP  Last administered on 

3/16/20at 03:23;  Start 3/16/20 at 03:30;  Stop 3/16/20 at 03:31;  Status DC


Fentanyl Citrate (Fentanyl 2ml Vial) 100 mcg STK-MED ONCE .ROUTE ;  Start 

3/16/20 at 03:18;  Stop 3/16/20 at 03:18;  Status DC


Iohexol (Omnipaque 350 Mg/ml) 90 ml 1X  ONCE IV  Last administered on 3/16/20at 

03:25;  Start 3/16/20 at 03:30;  Stop 3/16/20 at 03:31;  Status DC


Info (CONTRAST GIVEN -- Rx MONITORING) 1 each PRN DAILY  PRN MC SEE COMMENTS;  

Start 3/16/20 at 03:30;  Stop 3/18/20 at 03:29;  Status DC


Hydromorphone HCl (Dilaudid) 0.5 mg 1X  ONCE IV  Last administered on 3/16/20at 

03:55;  Start 3/16/20 at 04:30;  Stop 3/16/20 at 04:32;  Status DC


Ondansetron HCl (Zofran) 4 mg PRN Q8HRS  PRN IV NAUSEA/VOMITING 1ST CHOICE;  

Start 3/16/20 at 05:00;  Stop 3/16/20 at 09:27;  Status DC


Morphine Sulfate (Morphine Sulfate) 2 mg PRN Q2HR  PRN IV SEVERE PAIN 7-10 Last 

administered on 3/17/20at 12:26;  Start 3/16/20 at 05:00;  Stop 3/17/20 at 

14:15;  Status DC


Sodium Chloride 1,000 ml @  125 mls/hr Q8H IV  Last administered on 3/16/20at 

20:56;  Start 3/16/20 at 05:00;  Stop 3/17/20 at 04:59;  Status DC


Hydromorphone HCl (Dilaudid) 0.5 mg PRN Q3HRS  PRN IV SEVERE PAIN 7-10 Last 

administered on 3/17/20at 10:06;  Start 3/16/20 at 05:00;  Stop 3/17/20 at 

12:01;  Status DC


Piperacillin Sod/ Tazobactam Sod 4.5 gm/Sodium Chloride 100 ml @  200 mls/hr 1X 

ONCE IV  Last administered on 3/16/20at 05:44;  Start 3/16/20 at 06:00;  Stop 

3/16/20 at 06:29;  Status DC


Ondansetron HCl (Zofran) 4 mg PRN Q4HRS  PRN IV NAUSEA/VOMITING 1ST CHOICE Last 

administered on 5/25/20at 20:19;  Start 3/16/20 at 09:30


Insulin Human Lispro (HumaLOG) 0-9 UNITS Q6HRS SQ  Last administered on 

5/26/20at 06:02;  Start 3/16/20 at 09:30


Dextrose (Dextrose 50%-Water Syringe) 12.5 gm PRN Q15MIN  PRN IV SEE COMMENTS;  

Start 3/16/20 at 09:30


Pantoprazole Sodium (PROTONIX VIAL for IV PUSH) 40 mg DAILYAC IVP  Last 

administered on 5/26/20at 08:01;  Start 3/16/20 at 11:30


Prochlorperazine Edisylate (Compazine) 10 mg PRN Q6HRS  PRN IV NAUSEA/VOMITING, 

2nd CHOICE Last administered on 5/25/20at 23:32;  Start 3/16/20 at 17:45


Atenolol (Tenormin) 100 mg DAILY PO ;  Start 3/17/20 at 09:00;  Stop 3/16/20 at 

20:08;  Status DC


Metoprolol Tartrate (Lopressor Vial) 2.5 mg Q6HRS IVP  Last administered on 

3/17/20at 05:51;  Start 3/16/20 at 20:15;  Stop 3/17/20 at 10:02;  Status DC


Metoprolol Tartrate (Lopressor Vial) 5 mg Q6HRS IVP  Last administered on 

3/26/20at 00:12;  Start 3/17/20 at 10:15;  Stop 3/28/20 at 08:48;  Status DC


Hydromorphone HCl (Dilaudid) 1 mg PRN Q3HRS  PRN IV SEVERE PAIN 7-10 Last 

administered on 3/23/20at 05:13;  Start 3/17/20 at 12:00;  Stop 3/31/20 at 

00:25;  Status DC


Lidocaine HCl (Buffered Lidocaine 1%) 3 ml STK-MED ONCE .ROUTE ;  Start 3/17/20 

at 12:55;  Stop 3/17/20 at 12:56;  Status DC


Albumin Human 500 ml @  125 mls/hr 1X  ONCE IV  Last administered on 3/17/20at 

14:33;  Start 3/17/20 at 14:30;  Stop 3/17/20 at 18:32;  Status DC


Norepinephrine Bitartrate 8 mg/ Dextrose 258 ml @  17.299 mls/ hr CONT  PRN IV 

PER PROTOCOL Last administered on 4/14/20at 12:48;  Start 3/17/20 at 15:30;  

Stop 4/17/20 at 09:19;  Status DC


Sodium Chloride 1,000 ml @  125 mls/hr Q8H IV  Last administered on 3/17/20at 

21:04;  Start 3/17/20 at 16:00;  Stop 3/18/20 at 02:42;  Status DC


Albumin Human 500 ml @  125 mls/hr PRN BID  PRN IV After every 2L NSS & BP < 

90mm Last administered on 4/1/20at 14:21;  Start 3/17/20 at 16:00


Iohexol (Omnipaque 300 Mg/ml) 60 ml 1X  ONCE IV  Last administered on 3/17/20at 

17:20;  Start 3/17/20 at 17:00;  Stop 3/17/20 at 17:01;  Status DC


Info (CONTRAST GIVEN -- Rx MONITORING) 1 each PRN DAILY  PRN MC SEE COMMENTS;  

Start 3/17/20 at 17:00;  Stop 3/19/20 at 16:59;  Status DC


Meropenem 1 gm/ Sodium Chloride 100 ml @  200 mls/hr Q8HRS IV  Last administered

on 3/18/20at 05:45;  Start 3/17/20 at 20:00;  Stop 3/18/20 at 08:48;  Status DC


Furosemide (Lasix) 40 mg 1X  ONCE IVP  Last administered on 3/17/20at 22:12;  

Start 3/17/20 at 22:30;  Stop 3/17/20 at 22:31;  Status DC


Calcium Chloride 1000 mg/Sodium Chloride 110 ml @  220 mls/hr 1X  ONCE IV  Last 

administered on 3/17/20at 22:11;  Start 3/17/20 at 22:30;  Stop 3/17/20 at 

22:59;  Status DC


Albuterol Sulfate (Ventolin Neb Soln) 2.5 mg 1X  ONCE NEB  Last administered on 

3/18/20at 00:56;  Start 3/17/20 at 22:30;  Stop 3/17/20 at 22:31;  Status DC


Insulin Human Regular (HumuLIN R VIAL) 5 unit 1X  ONCE IV  Last administered on 

3/17/20at 22:14;  Start 3/17/20 at 22:30;  Stop 3/17/20 at 22:31;  Status DC


Magnesium Sulfate 50 ml @ 25 mls/hr 1X  ONCE IV  Last administered on 3/18/20at 

02:57;  Start 3/18/20 at 03:00;  Stop 3/18/20 at 04:59;  Status DC


Calcium Gluconate 1000 mg/Sodium Chloride 110 ml @  220 mls/hr 1X  ONCE IV  Last

administered on 3/18/20at 02:46;  Start 3/18/20 at 03:00;  Stop 3/18/20 at 

03:29;  Status DC


Sodium Chloride 1,000 ml @  200 mls/hr Q5H IV  Last administered on 3/18/20at 

02:46;  Start 3/18/20 at 03:00;  Stop 3/18/20 at 10:21;  Status DC


Calcium Gluconate 1000 mg/Sodium Chloride 110 ml @  220 mls/hr 1X  ONCE IV  Last

administered on 3/18/20at 03:21;  Start 3/18/20 at 03:30;  Stop 3/18/20 at 

03:59;  Status DC


Sodium Bicarbonate 50 meq/Sodium Chloride 1,050 ml @  75 mls/hr Q14H IV  Last 

administered on 3/22/20at 21:10;  Start 3/18/20 at 07:30;  Stop 3/23/20 at 

10:28;  Status DC


Calcium Gluconate 2000 mg/Sodium Chloride 120 ml @  220 mls/hr 1X  ONCE IV  Last

administered on 3/18/20at 09:05;  Start 3/18/20 at 07:30;  Stop 3/18/20 at 

08:02;  Status DC


Lidocaine HCl (Xylocaine-Mpf 1% 2ml Vial) 2 ml STK-MED ONCE .ROUTE ;  Start 

3/18/20 at 08:47;  Stop 3/18/20 at 08:47;  Status DC


Meropenem 500 mg/ Sodium Chloride 50 ml @  100 mls/hr Q12HR IV  Last 

administered on 3/23/20at 21:01;  Start 3/18/20 at 18:00;  Stop 3/24/20 at 

07:58;  Status DC


Lidocaine HCl (Buffered Lidocaine 1%) 3 ml STK-MED ONCE .ROUTE ;  Start 3/18/20 

at 09:46;  Stop 3/18/20 at 09:46;  Status DC


Lidocaine HCl (Buffered Lidocaine 1%) 6 ml 1X  ONCE INJ  Last administered on 

3/18/20at 10:26;  Start 3/18/20 at 10:15;  Stop 3/18/20 at 10:16;  Status DC


Info (Tpn Per Pharmacy) 1 each PRN DAILY  PRN MC SEE COMMENTS Last administered 

on 5/25/20at 09:33;  Start 3/18/20 at 12:00


Sodium Chloride 1,000 ml @  1,000 mls/hr Q1H PRN IV hypotension;  Start 3/18/20 

at 12:07;  Stop 3/18/20 at 18:06;  Status DC


Diphenhydramine HCl (Benadryl) 25 mg 1X PRN  PRN IV ITCHING;  Start 3/18/20 at 

12:15;  Stop 3/19/20 at 12:14;  Status DC


Diphenhydramine HCl (Benadryl) 25 mg 1X PRN  PRN IV ITCHING;  Start 3/18/20 at 

12:15;  Stop 3/19/20 at 12:14;  Status DC


Sodium Chloride 1,000 ml @  400 mls/hr Q2H30M PRN IV PATENCY;  Start 3/18/20 at 

12:07;  Stop 3/19/20 at 00:06;  Status DC


Info (PHARMACY MONITORING -- do not chart) 1 each PRN DAILY  PRN MC SEE 

COMMENTS;  Start 3/18/20 at 12:15;  Stop 3/20/20 at 08:13;  Status DC


Sodium Chloride 90 meq/Calcium Gluconate 10 meq/ Multivitamins 10 ml/Chromium/ 

Copper/Manganese/ Seleni/Zn 1 ml/ Total Parenteral Nutrition/Amino 

Acids/Dextrose/ Fat Emulsion Intravenous 55.005 ml  @ 2.292 mls/hr TPN  CONT IV 

;  Start 3/18/20 at 22:00;  Stop 3/18/20 at 12:33;  Status DC


Info (Tpn Per Pharmacy) 1 each PRN DAILY  PRN MC SEE COMMENTS;  Start 3/18/20 at

12:30;  Status UNV


Sodium Chloride 90 meq/Calcium Gluconate 10 meq/ Multivitamins 10 ml/Chromium/ 

Copper/Manganese/ Seleni/Zn 0.5 ml/ Total Parenteral Nutrition/Amino 

Acids/Dextrose/ Fat Emulsion Intravenous 1,512 ml @  63 mls/hr TPN  CONT IV  

Last administered on 3/18/20at 22:06;  Start 3/18/20 at 22:00;  Stop 3/19/20 at 

21:59;  Status DC


Calcium Carbonate/ Glycine (Tums) 500 mg PRN AFTMEALHC  PRN PO INDIGESTION;  

Start 3/18/20 at 17:45;  Stop 5/13/20 at 10:25;  Status DC


Calcium Gluconate (Calcium Gluconate) 2,000 mg 1X  ONCE IVP  Last administered 

on 3/19/20at 02:19;  Start 3/19/20 at 02:15;  Stop 3/19/20 at 02:16;  Status DC


Calcium Chloride 3000 mg/Sodium Chloride 1,030 ml @  50 mls/hr [id number] IV  Last 

administered on 3/21/20at 02:17;  Start 3/19/20 at 08:00;  Stop 3/21/20 at 

15:23;  Status DC


Lorazepam (Ativan Inj) 1 mg PRN Q4HRS  PRN IVP ANXIETY / AGITATION, 2nd choic 

Last administered on 4/17/20at 03:51;  Start 3/19/20 at 09:00;  Stop 4/17/20 at 

09:19;  Status DC


Sodium Chloride 1,000 ml @  1,000 mls/hr Q1H PRN IV hypotension;  Start 3/19/20 

at 08:56;  Stop 3/19/20 at 14:55;  Status DC


Albumin Human 200 ml @  200 mls/hr 1X PRN  PRN IV Hypotension;  Start 3/19/20 at

09:00;  Stop 3/19/20 at 14:59;  Status DC


Diphenhydramine HCl (Benadryl) 25 mg 1X PRN  PRN IV ITCHING;  Start 3/19/20 at 

09:00;  Stop 3/20/20 at 08:59;  Status DC


Diphenhydramine HCl (Benadryl) 25 mg 1X PRN  PRN IV ITCHING;  Start 3/19/20 at 

09:00;  Stop 3/20/20 at 08:59;  Status DC


Sodium Chloride 1,000 ml @  400 mls/hr Q2H30M PRN IV PATENCY;  Start 3/19/20 at 

08:56;  Stop 3/19/20 at 20:55;  Status DC


Info (PHARMACY MONITORING -- do not chart) 1 each PRN DAILY  PRN MC SEE 

COMMENTS;  Start 3/19/20 at 09:00;  Status UNV


Info (PHARMACY MONITORING -- do not chart) 1 each PRN DAILY  PRN MC SEE 

COMMENTS;  Start 3/19/20 at 09:00;  Stop 3/20/20 at 08:13;  Status DC


Digoxin (Lanoxin) 500 mcg 1X  ONCE IV  Last administered on 3/19/20at 10:04;  

Start 3/19/20 at 10:00;  Stop 3/19/20 at 10:01;  Status DC


Digoxin (Lanoxin) 125 mcg 1X  ONCE IV  Last administered on 3/19/20at 17:10;  

Start 3/19/20 at 18:00;  Stop 3/19/20 at 18:01;  Status DC


Magnesium Sulfate 100 ml @  25 mls/hr 1X  ONCE IV  Last administered on 3/1

9/20at 12:48;  Start 3/19/20 at 13:00;  Stop 3/19/20 at 16:59;  Status DC


Sodium Chloride 90 meq/Magnesium Sulfate 10 meq/ Calcium Gluconate 20 meq/ 

Multivitamins 10 ml/Chromium/ Copper/Manganese/ Seleni/Zn 0.5 ml/ Total 

Parenteral Nutrition/Amino Acids/Dextrose/ Fat Emulsion Intravenous 1,512 ml @  

63 mls/hr TPN  CONT IV  Last administered on 3/19/20at 22:25;  Start 3/19/20 at 

22:00;  Stop 3/20/20 at 21:59;  Status DC


Sodium Chloride 1,000 ml @  1,000 mls/hr Q1H PRN IV hypotension;  Start 3/20/20 

at 08:05;  Stop 3/20/20 at 14:04;  Status DC


Albumin Human 200 ml @  200 mls/hr 1X  ONCE IV  Last administered on 3/20/20at 

08:57;  Start 3/20/20 at 08:15;  Stop 3/20/20 at 09:14;  Status DC


Diphenhydramine HCl (Benadryl) 25 mg 1X PRN  PRN IV ITCHING;  Start 3/20/20 at 

08:15;  Stop 3/21/20 at 08:14;  Status DC


Diphenhydramine HCl (Benadryl) 25 mg 1X PRN  PRN IV ITCHING;  Start 3/20/20 at 

08:15;  Stop 3/21/20 at 08:14;  Status DC


Sodium Chloride 1,000 ml @  400 mls/hr Q2H30M PRN IV PATENCY;  Start 3/20/20 at 

08:05;  Stop 3/20/20 at 20:04;  Status DC


Info (PHARMACY MONITORING -- do not chart) 1 each PRN DAILY  PRN MC SEE 

COMMENTS;  Start 3/20/20 at 08:15;  Stop 3/24/20 at 07:57;  Status DC


Sodium Chloride 90 meq/Potassium Chloride 15 meq/ Potassium Phosphate 10 mmol/ M

agnesium Sulfate 10 meq/Calcium Gluconate 20 meq/ Multivitamins 10 ml/Chromium/ 

Copper/Manganese/ Seleni/Zn 0.5 ml/ Total Parenteral Nutrition/Amino 

Acids/Dextrose/ Fat Emulsion Intravenous 1,512 ml @  63 mls/hr TPN  CONT IV  

Last administered on 3/20/20at 21:01;  Start 3/20/20 at 22:00;  Stop 3/21/20 at 

21:59;  Status DC


Potassium Chloride/Water 100 ml @  100 mls/hr 1X  ONCE IV  Last administered on 

3/20/20at 14:09;  Start 3/20/20 at 14:00;  Stop 3/20/20 at 14:59;  Status DC


Benzocaine (Hurricaine One) 1 spray 1X  ONCE MM  Last administered on 3/20/20at 

16:38;  Start 3/20/20 at 14:30;  Stop 3/20/20 at 14:31;  Status DC


Lidocaine HCl (Glydo (Lidocaine) Jelly) 1 thomas 1X  ONCE MM  Last administered on 

3/20/20at 16:38;  Start 3/20/20 at 14:30;  Stop 3/20/20 at 14:31;  Status DC


Linezolid/Dextrose 300 ml @  300 mls/hr Q12HR IV  Last administered on 3/26/20at

21:04;  Start 3/20/20 at 20:00;  Stop 3/27/20 at 07:50;  Status DC


Acetaminophen (Tylenol) 650 mg PRN Q6HRS  PRN PO MILD PAIN / TEMP;  Start 

3/21/20 at 03:30;  Stop 3/21/20 at 03:36;  Status DC


Acetaminophen (Tylenol) 650 mg PRN Q6HRS  PRN PEG MILD PAIN / TEMP Last 

administered on 4/16/20at 19:56;  Start 3/21/20 at 03:36;  Stop 5/13/20 at 

10:25;  Status DC


Sodium Chloride 1,000 ml @  1,000 mls/hr Q1H PRN IV hypotension;  Start 3/21/20 

at 07:50;  Stop 3/21/20 at 13:49;  Status DC


Albumin Human 200 ml @  200 mls/hr 1X PRN  PRN IV Hypotension;  Start 3/21/20 at

08:00;  Stop 3/21/20 at 13:59;  Status DC


Sodium Chloride (Normal Saline Flush) 10 ml 1X PRN  PRN IV AP catheter pack;  

Start 3/21/20 at 08:00;  Stop 3/22/20 at 07:59;  Status DC


Sodium Chloride (Normal Saline Flush) 10 ml 1X PRN  PRN IV  catheter pack;  

Start 3/21/20 at 08:00;  Stop 3/22/20 at 07:59;  Status DC


Sodium Chloride 1,000 ml @  400 mls/hr Q2H30M PRN IV PATENCY;  Start 3/21/20 at 

07:50;  Stop 3/21/20 at 19:49;  Status DC


Info (PHARMACY MONITORING -- do not chart) 1 each PRN DAILY  PRN MC SEE 

COMMENTS;  Start 3/21/20 at 08:00;  Status UNV


Info (PHARMACY MONITORING -- do not chart) 1 each PRN DAILY  PRN MC SEE 

COMMENTS;  Start 3/21/20 at 08:00;  Stop 3/23/20 at 08:25;  Status DC


Sodium Chloride 90 meq/Potassium Chloride 15 meq/ Potassium Phosphate 10 mmol/ 

Magnesium Sulfate 10 meq/Calcium Gluconate 20 meq/ Multivitamins 10 ml/Chromium/

Copper/Manganese/ Seleni/Zn 0.5 ml/ Total Parenteral Nutrition/Amino 

Acids/Dextrose/ Fat Emulsion Intravenous 1,512 ml @  63 mls/hr TPN  CONT IV  

Last administered on 3/21/20at 20:57;  Start 3/21/20 at 22:00;  Stop 3/22/20 at 

21:59;  Status DC


Sodium Chloride 90 meq/Potassium Chloride 15 meq/ Potassium Phosphate 15 mmol/ 

Magnesium Sulfate 10 meq/Calcium Gluconate 20 meq/ Multivitamins 10 ml/Chromium/

Copper/Manganese/ Seleni/Zn 0.5 ml/ Total Parenteral Nutrition/Amino 

Acids/Dextrose/ Fat Emulsion Intravenous 1,512 ml @  63 mls/hr TPN  CONT IV ;  

Start 3/22/20 at 22:00;  Stop 3/22/20 at 14:16;  Status DC


Sodium Chloride 90 meq/Potassium Chloride 15 meq/ Potassium Phosphate 15 mmol/ 

Magnesium Sulfate 10 meq/Calcium Gluconate 20 meq/ Multivitamins 10 ml/Chromium/

Copper/Manganese/ Seleni/Zn 0.5 ml/ Total Parenteral Nutrition/Amino 

Acids/Dextrose/ Fat Emulsion Intravenous 1,200 ml @  50 mls/hr TPN  CONT IV ;  

Start 3/22/20 at 22:00;  Stop 3/22/20 at 14:17;  Status DC


Sodium Chloride 90 meq/Potassium Chloride 15 meq/ Potassium Phosphate 10 mmol/ 

Magnesium Sulfate 10 meq/Calcium Gluconate 20 meq/ Multivitamins 10 ml/Chromium/

Copper/Manganese/ Seleni/Zn 0.5 ml/ Total Parenteral Nutrition/Amino 

Acids/Dextrose/ Fat Emulsion Intravenous 1,200 ml @  50 mls/hr TPN  CONT IV  

Last administered on 3/22/20at 23:29;  Start 3/22/20 at 22:00;  Stop 3/23/20 at 

21:59;  Status DC


Sodium Chloride 1,000 ml @  1,000 mls/hr Q1H PRN IV hypotension;  Start 3/23/20 

at 07:28;  Stop 3/23/20 at 13:27;  Status DC


Albumin Human 200 ml @  200 mls/hr 1X  ONCE IV  Last administered on 3/23/20at 

08:51;  Start 3/23/20 at 07:30;  Stop 3/23/20 at 08:29;  Status DC


Diphenhydramine HCl (Benadryl) 25 mg 1X PRN  PRN IV ITCHING;  Start 3/23/20 at 

07:30;  Stop 3/24/20 at 07:29;  Status DC


Diphenhydramine HCl (Benadryl) 25 mg 1X PRN  PRN IV ITCHING;  Start 3/23/20 at 

07:30;  Stop 3/24/20 at 07:29;  Status DC


Sodium Chloride 1,000 ml @  400 mls/hr Q2H30M PRN IV PATENCY;  Start 3/23/20 at 

07:28;  Stop 3/23/20 at 19:27;  Status DC


Info (PHARMACY MONITORING -- do not chart) 1 each PRN DAILY  PRN MC SEE 

COMMENTS;  Start 3/23/20 at 07:30;  Stop 4/3/20 at 13:01;  Status DC


Metronidazole 100 ml @  100 mls/hr Q6HRS IV  Last administered on 4/8/20at 

06:26;  Start 3/23/20 at 08:30;  Stop 4/8/20 at 09:58;  Status DC


Micafungin Sodium 100 mg/Dextrose 100 ml @  100 mls/hr Q24H IV  Last 

administered on 4/30/20at 08:18;  Start 3/23/20 at 09:00;  Stop 4/30/20 at 20:

58;  Status DC


Propofol 0 ml @ As Directed STK-MED ONCE IV ;  Start 3/23/20 at 07:53;  Stop 

3/23/20 at 07:53;  Status DC


Etomidate (Amidate) 20 mg STK-MED ONCE IV ;  Start 3/23/20 at 07:53;  Stop 

3/23/20 at 07:54;  Status DC


Midazolam HCl (Versed) 5 mg STK-MED ONCE .ROUTE ;  Start 3/23/20 at 07:57;  Stop

3/23/20 at 07:57;  Status DC


Fentanyl Citrate 30 ml @ 0 mls/hr CONT  PRN IV SEE PROTOCOL Last administered on

4/17/20at 06:12;  Start 3/23/20 at 08:15;  Stop 4/17/20 at 09:19;  Status DC


Artificial Tears (Artificial Tears) 1 drop PRN Q1HR  PRN OU DRY EYE, 1st choice;

 Start 3/23/20 at 08:15;  Stop 4/29/20 at 05:31;  Status DC


Midazolam HCl 50 mg/Sodium Chloride 50 ml @ 0 mls/hr CONT  PRN IV SEE PROTOCOL 

Last administered on 3/26/20at 22:39;  Start 3/23/20 at 08:15;  Stop 3/28/20 at 

15:59;  Status DC


Etomidate (Amidate) 8 mg 1X  ONCE IV  Last administered on 3/23/20at 08:33;  

Start 3/23/20 at 08:30;  Stop 3/23/20 at 08:31;  Status DC


Succinylcholine Chloride (Anectine) 120 mg 1X  ONCE IV  Last administered on 

3/23/20at 08:34;  Start 3/23/20 at 08:30;  Stop 3/23/20 at 08:31;  Status DC


Midazolam HCl (Versed) 5 mg 1X  ONCE IV ;  Start 3/23/20 at 08:30;  Stop 3/23/20

at 08:31;  Status DC


Potassium Chloride 15 meq/ Bicarbonate Dialysis Soln w/ out KCl 5,007.5 ml  @ 

1,000 mls/ hr Q5H1M IV  Last administered on 3/24/20at 11:11;  Start 3/23/20 at 

12:00;  Stop 3/24/20 at 11:15;  Status DC


Potassium Chloride 15 meq/ Bicarbonate Dialysis Soln w/ out KCl 5,007.5 ml  @ 

1,000 mls/ hr Q5H1M IV  Last administered on 3/24/20at 11:12;  Start 3/23/20 at 

12:00;  Stop 3/24/20 at 11:17;  Status DC


Potassium Chloride 15 meq/ Bicarbonate Dialysis Soln w/ out KCl 5,007.5 ml  @ 

1,000 mls/ hr Q5H1M IV  Last administered on 3/24/20at 11:11;  Start 3/23/20 at 

12:00;  Stop 3/24/20 at 11:19;  Status DC


Sodium Chloride 90 meq/Potassium Chloride 15 meq/ Potassium Phosphate 10 mmol/ 

Magnesium Sulfate 10 meq/Calcium Gluconate 20 meq/ Multivitamins 10 ml/Chromium/

Copper/Manganese/ Seleni/Zn 0.5 ml/ Total Parenteral Nutrition/Amino 

Acids/Dextrose/ Fat Emulsion Intravenous 1,400 ml @  58.333 mls/ hr TPN  CONT IV

 Last administered on 3/23/20at 21:42;  Start 3/23/20 at 22:00;  Stop 3/24/20 at

21:59;  Status DC


Heparin Sodium (Porcine) (Heparin Sodium) 5,000 unit Q8HRS SQ  Last administered

on 3/28/20at 05:55;  Start 3/23/20 at 15:00;  Stop 3/28/20 at 13:28;  Status DC


Meropenem 500 mg/ Sodium Chloride 50 ml @  100 mls/hr Q6HRS IV  Last 

administered on 3/25/20at 06:00;  Start 3/24/20 at 09:00;  Stop 3/25/20 at 

07:29;  Status DC


Potassium Phosphate 20 mmol/ Sodium Chloride 106.6667 ml @  51.667 m... 1X  ONCE

IV  Last administered on 3/24/20at 11:22;  Start 3/24/20 at 10:15;  Stop 3/24/20

at 12:18;  Status DC


Acetaminophen (Tylenol Supp) 650 mg PRN Q6HRS  PRN [place] MILD PAIN / TEMP > 100.3'F

Last administered on 5/5/20at 09:12;  Start 3/24/20 at 10:30


Potassium Chloride/Water 100 ml @  100 mls/hr Q1H IV  Last administered on 

3/24/20at 12:12;  Start 3/24/20 at 11:00;  Stop 3/24/20 at 12:59;  Status DC


Potassium Chloride 20 meq/ Bicarbonate Dialysis Soln w/ out KCl 5,010 ml @  

1,000 mls/hr Q5H1M IV  Last administered on 3/25/20at 08:48;  Start 3/24/20 at 

12:00;  Stop 3/25/20 at 13:03;  Status DC


Potassium Chloride 20 meq/ Bicarbonate Dialysis Soln w/ out KCl 5,010 ml @  

1,000 mls/hr Q5H1M IV  Last administered on 3/29/20at 14:52;  Start 3/24/20 at 

11:30;  Stop 3/29/20 at 19:59;  Status DC


Potassium Chloride 20 meq/ Bicarbonate Dialysis Soln w/ out KCl 5,010 ml @  

1,000 mls/hr Q5H1M IV  Last administered on 3/29/20at 14:53;  Start 3/24/20 at 

11:30;  Stop 3/29/20 at 19:59;  Status DC


Sodium Chloride 90 meq/Potassium Chloride 15 meq/ Potassium Phosphate 15 mmol/ 

Magnesium Sulfate 10 meq/Calcium Gluconate 15 meq/ Multivitamins 10 ml/Chromium/

Copper/Manganese/ Seleni/Zn 0.5 ml/ Total Parenteral Nutrition/Amino Acids/De

xtrose/ Fat Emulsion Intravenous 1,400 ml @  58.333 mls/ hr TPN  CONT IV  Last 

administered on 3/24/20at 22:17;  Start 3/24/20 at 22:00;  Stop 3/25/20 at 

21:59;  Status DC


Cefepime HCl (Maxipime) 2 gm Q12HR IVP  Last administered on 4/7/20at 20:56;  

Start 3/25/20 at 09:00;  Stop 4/8/20 at 09:58;  Status DC


Daptomycin 500 mg/ Sodium Chloride 50 ml @  100 mls/hr Q48H IV  Last adminis

tered on 4/10/20at 09:57;  Start 3/25/20 at 08:30;  Stop 4/10/20 at 10:07;  

Status DC


Lidocaine HCl (Buffered Lidocaine 1%) 3 ml 1X  ONCE INJ  Last administered on 

3/25/20at 10:27;  Start 3/25/20 at 10:30;  Stop 3/25/20 at 10:31;  Status DC


Potassium Phosphate 20 mmol/ Sodium Chloride 106.6667 ml @  51.667 m... 1X  ONCE

IV  Last administered on 3/25/20at 12:51;  Start 3/25/20 at 13:00;  Stop 3/25/20

at 15:03;  Status DC


Sodium Chloride 90 meq/Potassium Chloride 15 meq/ Potassium Phosphate 18 mmol/ 

Magnesium Sulfate 8 meq/Calcium Gluconate 15 meq/ Multivitamins 10 ml/Chromium/ 

Copper/Manganese/ Seleni/Zn 0.5 ml/ Total Parenteral Nutrition/Amino 

Acids/Dextrose/ Fat Emulsion Intravenous 1,400 ml @  58.333 mls/ hr TPN  CONT IV

 Last administered on 3/25/20at 22:16;  Start 3/25/20 at 22:00;  Stop 3/26/20 at

21:59;  Status DC


Potassium Chloride 20 meq/ Bicarbonate Dialysis Soln w/ out KCl 5,010 ml @  

1,000 mls/hr Q5H1M IV  Last administered on 3/29/20at 14:54;  Start 3/25/20 at 

16:00;  Stop 3/29/20 at 19:59;  Status DC


Multi-Ingred Cream/Lotion/Oil/ Oint (Artificial Tears Eye Ointment) 1 thomas PRN 

Q1HR  PRN OU DRY EYE, 2nd choice Last administered on 4/13/20at 08:19;  Start 

3/25/20 at 17:30


Sodium Chloride 90 meq/Potassium Chloride 15 meq/ Potassium Phosphate 18 mmol/ 

Magnesium Sulfate 8 meq/Calcium Gluconate 15 meq/ Multivitamins 10 ml/Chromium/ 

Copper/Manganese/ Seleni/Zn 0.5 ml/ Total Parenteral Nutrition/Amino 

Acids/Dextrose/ Fat Emulsion Intravenous 1,400 ml @  58.333 mls/ hr TPN  CONT IV

 Last administered on 3/26/20at 22:00;  Start 3/26/20 at 22:00;  Stop 3/27/20 at

21:59;  Status DC


Albumin Human 500 ml @  125 mls/hr 1X  ONCE IV ;  Start 3/26/20 at 14:15;  Stop 

3/26/20 at 18:14;  Status DC


Sodium Chloride 90 meq/Potassium Chloride 15 meq/ Potassium Phosphate 18 mmol/ 

Magnesium Sulfate 8 meq/Calcium Gluconate 15 meq/ Multivitamins 10 ml/Chromium/ 

Copper/Manganese/ Seleni/Zn 0.5 ml/ Insulin Human Regular 10 unit/ Total 

Parenteral Nutrition/Amino Acids/Dextrose/ Fat Emulsion Intravenous 1,400 ml @  

58.333 mls/ hr TPN  CONT IV  Last administered on 3/27/20at 21:43;  Start 

3/27/20 at 22:00;  Stop 3/28/20 at 21:59;  Status DC


Lidocaine HCl (Buffered Lidocaine 1%) 3 ml STK-MED ONCE .ROUTE ;  Start 3/25/20 

at 10:00;  Stop 3/27/20 at 13:57;  Status DC


Midazolam HCl 100 mg/Sodium Chloride 100 ml @ 7 mls/hr CONT  PRN IV SEE PROTOCOL

Last administered on 4/8/20at 15:35;  Start 3/28/20 at 16:00


Sodium Chloride 90 meq/Potassium Chloride 15 meq/ Potassium Phosphate 18 mmol/ 

Magnesium Sulfate 8 meq/Calcium Gluconate 15 meq/ Multivitamins 10 ml/Chromium/ 

Copper/Manganese/ Seleni/Zn 0.5 ml/ Insulin Human Regular 15 unit/ Total 

Parenteral Nutrition/Amino Acids/Dextrose/ Fat Emulsion Intravenous 1,400 ml @  

58.333 mls/ hr TPN  CONT IV  Last administered on 3/28/20at 20:34;  Start 3/28/

20 at 22:00;  Stop 3/29/20 at 21:59;  Status DC


Info ([facility] Electrolyte Protocol) 1 ea CONT PRN  PRN MC PER PROTOCOL;  Start 3/29/

20 at 13:15


Sodium Chloride 90 meq/Potassium Chloride 15 meq/ Potassium Phosphate 18 mmol/ 

Magnesium Sulfate 8 meq/Calcium Gluconate 15 meq/ Multivitamins 10 ml/Chromium/ 

Copper/Manganese/ Seleni/Zn 0.5 ml/ Insulin Human Regular 15 unit/ Total 

Parenteral Nutrition/Amino Acids/Dextrose/ Fat Emulsion Intravenous 1,400 ml @  

58.333 mls/ hr TPN  CONT IV  Last administered on 3/29/20at 22:05;  Start 

3/29/20 at 22:00;  Stop 3/30/20 at 21:59;  Status DC


Potassium Chloride 15 meq/ Bicarbonate Dialysis Soln w/ out KCl 5,007.5 ml  @ 

1,000 mls/ hr Q5H1M IV  Last administered on 4/1/20at 18:14;  Start 3/29/20 at 

20:00;  Stop 4/2/20 at 13:08;  Status DC


Potassium Chloride 15 meq/ Bicarbonate Dialysis Soln w/ out KCl 5,007.5 ml  @ 

1,000 mls/ hr Q5H1M IV  Last administered on 4/1/20at 18:14;  Start 3/29/20 at 

20:00;  Stop 4/2/20 at 13:08;  Status DC


Potassium Chloride 15 meq/ Bicarbonate Dialysis Soln w/ out KCl 5,007.5 ml  @ 

1,000 mls/ hr Q5H1M IV  Last administered on 4/1/20at 18:14;  Start 3/29/20 at 

20:00;  Stop 4/2/20 at 13:08;  Status DC


Iohexol (Omnipaque 240 Mg/ml) 30 ml 1X  ONCE PO  Last administered on 3/30/20at 

11:30;  Start 3/30/20 at 11:30;  Stop 3/30/20 at 11:33;  Status DC


Info (CONTRAST GIVEN -- Rx MONITORING) 1 each PRN DAILY  PRN MC SEE COMMENTS;  

Start 3/30/20 at 11:45;  Stop 4/1/20 at 11:44;  Status DC


Sodium Chloride 90 meq/Potassium Chloride 15 meq/ Potassium Phosphate 18 mmol/ 

Magnesium Sulfate 8 meq/Calcium Gluconate 15 meq/ Multivitamins 10 ml/Chromium/ 

Copper/Manganese/ Seleni/Zn 0.5 ml/ Insulin Human Regular 15 unit/ Total 

Parenteral Nutrition/Amino Acids/Dextrose/ Fat Emulsion Intravenous 1,400 ml @  

58.333 mls/ hr TPN  CONT IV  Last administered on 3/30/20at 21:47;  Start 

3/30/20 at 22:00;  Stop 3/31/20 at 21:59;  Status DC


Sodium Chloride 90 meq/Potassium Chloride 15 meq/ Potassium Phosphate 18 mmol/ 

Magnesium Sulfate 8 meq/Calcium Gluconate 15 meq/ Multivitamins 10 ml/Chromium/ 

Copper/Manganese/ Seleni/Zn 0.5 ml/ Insulin Human Regular 20 unit/ Total 

Parenteral Nutrition/Amino Acids/Dextrose/ Fat Emulsion Intravenous 1,400 ml @  

58.333 mls/ hr TPN  CONT IV  Last administered on 3/31/20at 21:36;  Start 

3/31/20 at 22:00;  Stop 4/1/20 at 21:59;  Status DC


Alteplase, Recombinant (Cathflo For Central Catheter Clearance) 1 mg 1X  ONCE 

INT CAT  Last administered on 3/31/20at 20:03;  Start 3/31/20 at 19:30;  Stop 

3/31/20 at 19:46;  Status DC


Alteplase, Recombinant (Cathflo For Central Catheter Clearance) 1 mg 1X  ONCE 

INT CAT  Last administered on 3/31/20at 22:05;  Start 3/31/20 at 22:00;  Stop 

3/31/20 at 22:01;  Status DC


Sodium Chloride 90 meq/Potassium Chloride 15 meq/ Potassium Phosphate 18 mmol/ 

Magnesium Sulfate 8 meq/Calcium Gluconate 15 meq/ Multivitamins 10 ml/Chromium/ 

Copper/Manganese/ Seleni/Zn 0.5 ml/ Insulin Human Regular 20 unit/ Total 

Parenteral Nutrition/Amino Acids/Dextrose/ Fat Emulsion Intravenous 1,400 ml @  

58.333 mls/ hr TPN  CONT IV  Last administered on 4/1/20at 21:30;  Start 4/1/20 

at 22:00;  Stop 4/2/20 at 21:59;  Status DC


Dexmedetomidine HCl 400 mcg/ Sodium Chloride 100 ml @ 0 mls/hr CONT  PRN IV 

ANXIETY / AGITATION Last administered on 5/26/20at 08:07;  Start 4/2/20 at 08:15


Sodium Chloride 500 ml @  500 mls/hr 1X PRN  PRN IV ELEVATED BP, SEE COMMENTS;  

Start 4/2/20 at 08:15


Atropine Sulfate (ATROPINE 0.5mg SYRINGE) 0.5 mg PRN Q5MIN  PRN IV SEE COMMENTS;

 Start 4/2/20 at 08:15


Furosemide (Lasix) 20 mg 1X  ONCE IVP  Last administered on 4/2/20at 08:19;  

Start 4/2/20 at 08:15;  Stop 4/2/20 at 08:16;  Status DC


Lidocaine HCl (Buffered Lidocaine 1%) 3 ml STK-MED ONCE .ROUTE ;  Start 4/2/20 

at 08:39;  Stop 4/2/20 at 08:39;  Status DC


Lidocaine HCl (Buffered Lidocaine 1%) 6 ml 1X  ONCE INJ  Last administered on 4/ 2/20at 09:05;  Start 4/2/20 at 09:00;  Stop 4/2/20 at 09:06;  Status DC


Sodium Chloride 90 meq/Potassium Chloride 15 meq/ Potassium Phosphate 18 mmol/ 

Magnesium Sulfate 8 meq/Calcium Gluconate 15 meq/ Multivitamins 10 ml/Chromium/ 

Copper/Manganese/ Seleni/Zn 0.5 ml/ Insulin Human Regular 20 unit/ Total 

Parenteral Nutrition/Amino Acids/Dextrose/ Fat Emulsion Intravenous 1,400 ml @  

58.333 mls/ hr TPN  CONT IV  Last administered on 4/2/20at 22:45;  Start 4/2/20 

at 22:00;  Stop 4/3/20 at 21:59;  Status DC


Sodium Chloride 1,000 ml @  1,000 mls/hr Q1H PRN IV hypotension;  Start 4/3/20 

at 07:30;  Stop 4/3/20 at 13:29;  Status DC


Albumin Human 200 ml @  200 mls/hr 1X PRN  PRN IV Hypotension Last administered 

on 4/3/20at 09:36;  Start 4/3/20 at 07:30;  Stop 4/3/20 at 13:29;  Status DC


Sodium Chloride (Normal Saline Flush) 10 ml 1X PRN  PRN IV AP catheter pack;  

Start 4/3/20 at 07:30;  Stop 4/3/20 at 21:29;  Status DC


Sodium Chloride (Normal Saline Flush) 10 ml 1X PRN  PRN IV  catheter pack;  

Start 4/3/20 at 07:30;  Stop 4/4/20 at 07:29;  Status DC


Sodium Chloride 1,000 ml @  400 mls/hr Q2H30M PRN IV PATENCY;  Start 4/3/20 at 

07:30;  Stop 4/3/20 at 19:29;  Status DC


Info (PHARMACY MONITORING -- do not chart) 1 each PRN DAILY  PRN MC SEE 

COMMENTS;  Start 4/3/20 at 07:30;  Stop 4/3/20 at 13:02;  Status DC


Info (PHARMACY MONITORING -- do not chart) 1 each PRN DAILY  PRN MC SEE 

COMMENTS;  Start 4/3/20 at 07:30;  Stop 4/5/20 at 12:45;  Status DC


Sodium Chloride 90 meq/Potassium Chloride 15 meq/ Potassium Phosphate 10 mmol/ 

Magnesium Sulfate 8 meq/Calcium Gluconate 15 meq/ Multivitamins 10 ml/Chromium/ 

Copper/Manganese/ Seleni/Zn 0.5 ml/ Insulin Human Regular 25 unit/ Total 

Parenteral Nutrition/Amino Acids/Dextrose/ Fat Emulsion Intravenous 1,400 ml @  

58.333 mls/ hr TPN  CONT IV  Last administered on 4/3/20at 22:19;  Start 4/3/20 

at 22:00;  Stop 4/4/20 at 21:59;  Status DC


Heparin Sodium (Porcine) (Heparin Sodium) 5,000 unit Q12HR SQ  Last administered

on 4/26/20at 08:59;  Start 4/3/20 at 21:00;  Stop 4/26/20 at 10:05;  Status DC


Ondansetron HCl (Zofran) 4 mg PRN Q6HRS  PRN IV NAUSEA/VOMITING;  Start 4/6/20 

at 07:00;  Stop 4/7/20 at 06:59;  Status DC


Fentanyl Citrate (Fentanyl 2ml Vial) 25 mcg PRN Q5MIN  PRN IV MILD PAIN 1-3;  

Start 4/6/20 at 07:00;  Stop 4/7/20 at 06:59;  Status DC


Fentanyl Citrate (Fentanyl 2ml Vial) 50 mcg PRN Q5MIN  PRN IV MODERATE TO SEVERE

PAIN;  Start 4/6/20 at 07:00;  Stop 4/7/20 at 06:59;  Status DC


Ringer's Solution 1,000 ml @  30 mls/hr Q24H IV ;  Start 4/6/20 at 07:00;  Stop 

4/6/20 at 18:59;  Status DC


Lidocaine HCl (Xylocaine-Mpf 1% 2ml Vial) 2 ml PRN 1X  PRN ID PRIOR TO IV START;

 Start 4/6/20 at 07:00;  Stop 4/7/20 at 06:59;  Status DC


Prochlorperazine Edisylate (Compazine) 5 mg PACU PRN  PRN IV NAUSEA, MRX1;  

Start 4/6/20 at 07:00;  Stop 4/7/20 at 06:59;  Status DC


Sodium Chloride 1,000 ml @  1,000 mls/hr Q1H PRN IV hypotension;  Start 4/4/20 

at 09:10;  Stop 4/4/20 at 15:09;  Status DC


Albumin Human 200 ml @  200 mls/hr 1X PRN  PRN IV Hypotension Last administered 

on 4/4/20at 10:10;  Start 4/4/20 at 09:15;  Stop 4/4/20 at 15:14;  Status DC


Sodium Chloride 1,000 ml @  400 mls/hr Q2H30M PRN IV PATENCY;  Start 4/4/20 at 

09:10;  Stop 4/4/20 at 21:09;  Status DC


Info (PHARMACY MONITORING -- do not chart) 1 each PRN DAILY  PRN MC SEE 

COMMENTS;  Start 4/4/20 at 09:15;  Stop 4/5/20 at 12:45;  Status DC


Info (PHARMACY MONITORING -- do not chart) 1 each PRN DAILY  PRN MC SEE COMMENT

S;  Start 4/4/20 at 09:15;  Stop 4/5/20 at 12:45;  Status DC


Sodium Chloride 90 meq/Potassium Chloride 15 meq/ Potassium Phosphate 10 mmol/ 

Magnesium Sulfate 8 meq/Calcium Gluconate 15 meq/ Multivitamins 10 ml/Chromium/ 

Copper/Manganese/ Seleni/Zn 0.5 ml/ Insulin Human Regular 25 unit/ Total 

Parenteral Nutrition/Amino Acids/Dextrose/ Fat Emulsion Intravenous 1,400 ml @  

58.333 mls/ hr TPN  CONT IV  Last administered on 4/4/20at 22:10;  Start 4/4/20 

at 22:00;  Stop 4/5/20 at 21:59;  Status DC


Magnesium Sulfate 50 ml @ 25 mls/hr PRN DAILY  PRN IV for Mag < 1.7 on am labs 

Last administered on 4/20/20at 17:27;  Start 4/5/20 at 09:15


Sodium Chloride 90 meq/Potassium Chloride 15 meq/ Potassium Phosphate 10 mmol/ 

Magnesium Sulfate 8 meq/Calcium Gluconate 15 meq/ Multivitamins 10 ml/Chromium/ 

Copper/Manganese/ Seleni/Zn 0.5 ml/ Insulin Human Regular 25 unit/ Total 

Parenteral Nutrition/Amino Acids/Dextrose/ Fat Emulsion Intravenous 1,400 ml @  

58.333 mls/ hr TPN  CONT IV  Last administered on 4/5/20at 21:20;  Start 4/5/20 

at 22:00;  Stop 4/6/20 at 21:59;  Status DC


Sodium Chloride 1,000 ml @  1,000 mls/hr Q1H PRN IV hypotension;  Start 4/5/20 

at 12:23;  Stop 4/5/20 at 18:22;  Status DC


Albumin Human 200 ml @  200 mls/hr 1X  ONCE IV  Last administered on 4/5/20at 

13:34;  Start 4/5/20 at 12:30;  Stop 4/5/20 at 13:29;  Status DC


Diphenhydramine HCl (Benadryl) 25 mg 1X PRN  PRN IV ITCHING;  Start 4/5/20 at 

12:30;  Stop 4/6/20 at 12:29;  Status DC


Diphenhydramine HCl (Benadryl) 25 mg 1X PRN  PRN IV ITCHING;  Start 4/5/20 at 

12:30;  Stop 4/6/20 at 12:29;  Status DC


Info (PHARMACY MONITORING -- do not chart) 1 each PRN DAILY  PRN MC SEE 

COMMENTS;  Start 4/5/20 at 12:30;  Status Cancel


Bupivacaine HCl/ Epinephrine Bitart (Sensorcain-Epi 0.5%-1:[id number] Mpf) 30 ml 

STK-MED ONCE .ROUTE  Last administered on 4/6/20at 11:44;  Start 4/6/20 at 

11:00;  Stop 4/6/20 at 11:01;  Status DC


Cellulose (Surgicel Fibrillar 1x2) 1 each STK-MED ONCE .ROUTE ;  Start 4/6/20 at

11:00;  Stop 4/6/20 at 11:01;  Status DC


Sodium Chloride 90 meq/Potassium Chloride 15 meq/ Potassium Phosphate 10 mmol/ 

Magnesium Sulfate 12 meq/Calcium Gluconate 15 meq/ Multivitamins 10 ml/Chromium/

Copper/Manganese/ Seleni/Zn 0.5 ml/ Insulin Human Regular 25 unit/ Total 

Parenteral Nutrition/Amino Acids/Dextrose/ Fat Emulsion Intravenous 1,400 ml @  

58.333 mls/ hr TPN  CONT IV  Last administered on 4/6/20at 22:24;  Start 4/6/20 

at 22:00;  Stop 4/7/20 at 21:59;  Status DC


Propofol 20 ml @ As Directed STK-MED ONCE IV ;  Start 4/6/20 at 11:07;  Stop 

4/6/20 at 11:07;  Status DC


Cellulose (Surgicel Hemostat 4x8) 1 each STK-MED ONCE .ROUTE  Last administered 

on 4/6/20at 11:44;  Start 4/6/20 at 11:55;  Stop 4/6/20 at 11:56;  Status DC


Sevoflurane (Ultane) 60 ml STK-MED ONCE IH ;  Start 4/6/20 at 12:46;  Stop 

4/6/20 at 12:46;  Status DC


Sodium Chloride 1,000 ml @  1,000 mls/hr Q1H PRN IV hypotension;  Start 4/6/20 

at 13:51;  Stop 4/6/20 at 19:50;  Status DC


Albumin Human 200 ml @  200 mls/hr 1X PRN  PRN IV Hypotension Last administered 

on 4/6/20at 14:51;  Start 4/6/20 at 14:00;  Stop 4/6/20 at 19:59;  Status DC


Diphenhydramine HCl (Benadryl) 25 mg 1X PRN  PRN IV ITCHING;  Start 4/6/20 at 

14:00;  Stop 4/7/20 at 13:59;  Status DC


Diphenhydramine HCl (Benadryl) 25 mg 1X PRN  PRN IV ITCHING;  Start 4/6/20 at 

14:00;  Stop 4/7/20 at 13:59;  Status DC


Sodium Chloride 1,000 ml @  400 mls/hr Q2H30M PRN IV PATENCY;  Start 4/6/20 at 

13:51;  Stop 4/7/20 at 01:50;  Status DC


Info (PHARMACY MONITORING -- do not chart) 1 each PRN DAILY  PRN MC SEE 

COMMENTS;  Start 4/6/20 at 14:00;  Stop 4/9/20 at 08:16;  Status DC


Heparin Sodium (Porcine) (Hep Lock Adult) 500 unit STK-MED ONCE IVP ;  Start 

4/7/20 at 09:29;  Stop 4/7/20 at 09:30;  Status DC


Sodium Chloride 1,000 ml @  1,000 mls/hr Q1H PRN IV hypotension;  Start 4/7/20 

at 10:43;  Stop 4/7/20 at 16:42;  Status DC


Sodium Chloride 1,000 ml @  400 mls/hr Q2H30M PRN IV PATENCY;  Start 4/7/20 at 

10:43;  Stop 4/7/20 at 22:42;  Status DC


Info (PHARMACY MONITORING -- do not chart) 1 each PRN DAILY  PRN MC SEE 

COMMENTS;  Start 4/7/20 at 10:45;  Status UNV


Info (PHARMACY MONITORING -- do not chart) 1 each PRN DAILY  PRN MC SEE 

COMMENTS;  Start 4/7/20 at 10:45;  Status UNV


Sodium Chloride 90 meq/Potassium Chloride 15 meq/ Magnesium Sulfate 12 

meq/Calcium Gluconate 15 meq/ Multivitamins 10 ml/Chromium/ Copper/Manganese/ 

Seleni/Zn 0.5 ml/ Insulin Human Regular 25 unit/ Total Parenteral 

Nutrition/Amino Acids/Dextrose/ Fat Emulsion Intravenous 1,400 ml @  58.333 mls/

hr TPN  CONT IV  Last administered on 4/7/20at 22:13;  Start 4/7/20 at 22:00;  

Stop 4/8/20 at 21:59;  Status DC


Sodium Chloride 1,000 ml @  1,000 mls/hr Q1H PRN IV hypotension;  Start 4/8/20 

at 07:50;  Stop 4/8/20 at 13:49;  Status DC


Albumin Human 200 ml @  200 mls/hr 1X  ONCE IV ;  Start 4/8/20 at 08:00;  Stop 

4/8/20 at 08:53;  Status DC


Diphenhydramine HCl (Benadryl) 25 mg 1X PRN  PRN IV ITCHING;  Start 4/8/20 at 

08:00;  Stop 4/9/20 at 07:59;  Status DC


Diphenhydramine HCl (Benadryl) 25 mg 1X PRN  PRN IV ITCHING;  Start 4/8/20 at 

08:00;  Stop 4/9/20 at 07:59;  Status DC


Info (PHARMACY MONITORING -- do not chart) 1 each PRN DAILY  PRN MC SEE 

COMMENTS;  Start 4/8/20 at 08:00;  Stop 4/9/20 at 08:16;  Status DC


Albumin Human 50 ml @ 50 mls/hr 1X  ONCE IV ;  Start 4/8/20 at 08:53;  Stop 

4/8/20 at 08:56;  Status DC


Albumin Human 200 ml @  50 mls/hr PRN 1X  PRN IV HYPOTENSION Last administered 

on 4/14/20at 11:54;  Start 4/8/20 at 09:00;  Stop 5/21/20 at 11:14;  Status DC


Meropenem 500 mg/ Sodium Chloride 50 ml @  100 mls/hr Q12H IV  Last administered

on 4/28/20at 10:45;  Start 4/8/20 at 10:00;  Stop 4/28/20 at 12:37;  Status DC


Sodium Chloride 90 meq/Magnesium Sulfate 12 meq/ Calcium Gluconate 15 meq/ 

Multivitamins 10 ml/Chromium/ Copper/Manganese/ Seleni/Zn 0.5 ml/ Insulin Human 

Regular 25 unit/ Total Parenteral Nutrition/Amino Acids/Dextrose/ Fat Emulsion 

Intravenous 1,400 ml @  58.333 mls/ hr TPN  CONT IV  Last administered on 

4/8/20at 21:41;  Start 4/8/20 at 22:00;  Stop 4/9/20 at 21:59;  Status DC


Sodium Chloride 1,000 ml @  1,000 mls/hr Q1H PRN IV hypotension;  Start 4/9/20 

at 07:58;  Stop 4/9/20 at 13:57;  Status DC


Albumin Human 200 ml @  200 mls/hr 1X PRN  PRN IV Hypotension Last administered 

on 4/9/20at 09:30;  Start 4/9/20 at 08:00;  Stop 4/9/20 at 13:59;  Status DC


Sodium Chloride 1,000 ml @  400 mls/hr Q2H30M PRN IV PATENCY;  Start 4/9/20 at 

07:58;  Stop 4/9/20 at 19:57;  Status DC


Info (PHARMACY MONITORING -- do not chart) 1 each PRN DAILY  PRN MC SEE 

COMMENTS;  Start 4/9/20 at 08:00;  Status Cancel


Info (PHARMACY MONITORING -- do not chart) 1 each PRN DAILY  PRN MC SEE 

COMMENTS;  Start 4/9/20 at 08:15;  Status UNV


Sodium Chloride 90 meq/Potassium Phosphate 5 mmol/ Magnesium Sulfate 12 

meq/Calcium Gluconate 15 meq/ Multivitamins 10 ml/Chromium/ Copper/Manganese/ 

Seleni/Zn 0.5 ml/ Insulin Human Regular 30 unit/ Total Parenteral 

Nutrition/Amino Acids/Dextrose/ Fat Emulsion Intravenous 1,400 ml @  58.333 mls/

hr TPN  CONT IV  Last administered on 4/9/20at 22:08;  Start 4/9/20 at 22:00;  

Stop 4/10/20 at 21:59;  Status DC


Linezolid/Dextrose 300 ml @  300 mls/hr Q12HR IV  Last administered on 4/20/20at

20:40;  Start 4/10/20 at 11:00;  Stop 4/21/20 at 08:10;  Status DC


Sodium Chloride 90 meq/Potassium Phosphate 15 mmol/ Magnesium Sulfate 12 meq/Ca

lcium Gluconate 15 meq/ Multivitamins 10 ml/Chromium/ Copper/Manganese/ 

Seleni/Zn 0.5 ml/ Insulin Human Regular 30 unit/ Total Parenteral 

Nutrition/Amino Acids/Dextrose/ Fat Emulsion Intravenous 1,400 ml @  58.333 mls/

hr TPN  CONT IV  Last administered on 4/10/20at 21:49;  Start 4/10/20 at 22:00; 

Stop 4/11/20 at 21:59;  Status DC


Sodium Chloride 90 meq/Potassium Phosphate 15 mmol/ Magnesium Sulfate 12 

meq/Calcium Gluconate 15 meq/ Multivitamins 10 ml/Chromium/ Copper/Manganese/ 

Seleni/Zn 0.5 ml/ Insulin Human Regular 40 unit/ Total Parenteral 

Nutrition/Amino Acids/Dextrose/ Fat Emulsion Intravenous 1,400 ml @  58.333 mls/

hr TPN  CONT IV  Last administered on 4/11/20at 21:21;  Start 4/11/20 at 22:00; 

Stop 4/12/20 at 21:59;  Status DC


Sodium Chloride 1,000 ml @  1,000 mls/hr Q1H PRN IV hypotension;  Start 4/11/20 

at 13:26;  Stop 4/11/20 at 19:25;  Status DC


Albumin Human 200 ml @  200 mls/hr 1X PRN  PRN IV Hypotension Last administered 

on 4/11/20at 15:00;  Start 4/11/20 at 13:30;  Stop 4/11/20 at 19:29;  Status DC


Sodium Chloride (Normal Saline Flush) 10 ml 1X PRN  PRN IV AP catheter pack;  

Start 4/11/20 at 13:30;  Stop 4/12/20 at 13:29;  Status DC


Sodium Chloride (Normal Saline Flush) 10 ml 1X PRN  PRN IV  catheter pack;  

Start 4/11/20 at 13:30;  Stop 4/12/20 at 13:29;  Status DC


Sodium Chloride 1,000 ml @  400 mls/hr Q2H30M PRN IV PATENCY;  Start 4/11/20 at 

13:26;  Stop 4/12/20 at 01:25;  Status DC


Info (PHARMACY MONITORING -- do not chart) 1 each PRN DAILY  PRN MC SEE 

COMMENTS;  Start 4/11/20 at 13:30;  Stop 4/11/20 at 13:33;  Status DC


Info (PHARMACY MONITORING -- do not chart) 1 each PRN DAILY  PRN MC SEE 

COMMENTS;  Start 4/11/20 at 13:30;  Stop 4/11/20 at 13:34;  Status DC


Sodium Chloride 90 meq/Potassium Phosphate 19 mmol/ Magnesium Sulfate 12 meq/[place]

cium Gluconate 15 meq/ Multivitamins 10 ml/Chromium/ Copper/Manganese/ Seleni/Zn

0.5 ml/ Insulin Human Regular 40 unit/ Total Parenteral Nutrition/Amino 

Acids/Dextrose/ Fat Emulsion Intravenous 1,400 ml @  58.333 mls/ hr TPN  CONT IV

 Last administered on 4/12/20at 21:54;  Start 4/12/20 at 22:00;  Stop 4/13/20 at

21:59;  Status DC


Sodium Chloride 1,000 ml @  1,000 mls/hr Q1H PRN IV hypotension;  Start 4/13/20 

at 09:35;  Stop 4/13/20 at 15:34;  Status DC


Albumin Human 200 ml @  200 mls/hr 1X PRN  PRN IV Hypotension;  Start 4/13/20 at

09:45;  Stop 4/13/20 at 15:44;  Status DC


Diphenhydramine HCl (Benadryl) 25 mg 1X PRN  PRN IV ITCHING;  Start 4/13/20 at 

09:45;  Stop 4/14/20 at 09:44;  Status DC


Diphenhydramine HCl (Benadryl) 25 mg 1X PRN  PRN IV ITCHING;  Start 4/13/20 at 

09:45;  Stop 4/14/20 at 09:44;  Status DC


Sodium Chloride 1,000 ml @  400 mls/hr Q2H30M PRN IV PATENCY;  Start 4/13/20 at 

09:35;  Stop 4/13/20 at 21:34;  Status DC


Info (PHARMACY MONITORING -- do not chart) 1 each PRN DAILY  PRN MC SEE COMMENT

S;  Start 4/13/20 at 09:45;  Status Cancel


Sodium Chloride 100 meq/Potassium Phosphate 19 mmol/ Magnesium Sulfate 12 

meq/Calcium Gluconate 15 meq/ Multivitamins 10 ml/Chromium/ Copper/Manganese/ 

Seleni/Zn 0.5 ml/ Insulin Human Regular 40 unit/ Potassium Chloride 20 meq/ 

Total Parenteral Nutrition/Amino Acids/Dextrose/ Fat Emulsion Intravenous 1,400 

ml @  58.333 mls/ hr TPN  CONT IV  Last administered on 4/13/20at 22:02;  Start 

4/13/20 at 22:00;  Stop 4/14/20 at 21:59;  Status DC


Furosemide (Lasix) 40 mg 1X  ONCE IVP  Last administered on 4/13/20at 14:39;  

Start 4/13/20 at 14:30;  Stop 4/13/20 at 14:31;  Status DC


Metronidazole 100 ml @  100 mls/hr Q8HRS IV  Last administered on 4/21/20at 

06:04;  Start 4/14/20 at 10:00;  Stop 4/21/20 at 08:10;  Status DC


Sodium Chloride 1,000 ml @  1,000 mls/hr Q1H PRN IV hypotension;  Start 4/14/20 

at 08:00;  Stop 4/14/20 at 13:59;  Status DC


Albumin Human 200 ml @  200 mls/hr 1X PRN  PRN IV Hypotension;  Start 4/14/20 at

08:00;  Stop 4/14/20 at 13:59;  Status DC


Sodium Chloride 1,000 ml @  400 mls/hr Q2H30M PRN IV PATENCY;  Start 4/14/20 at 

08:00;  Stop 4/14/20 at 19:59;  Status DC


Info (PHARMACY MONITORING -- do not chart) 1 each PRN DAILY  PRN MC SEE 

COMMENTS;  Start 4/14/20 at 11:30;  Status UNV


Info (PHARMACY MONITORING -- do not chart) 1 each PRN DAILY  PRN MC SEE 

COMMENTS;  Start 4/14/20 at 11:30;  Stop 4/16/20 at 12:13;  Status DC


Sodium Chloride 100 meq/Potassium Phosphate 19 mmol/ Magnesium Sulfate 12 

meq/Calcium Gluconate 15 meq/ Multivitamins 10 ml/Chromium/ Copper/Manganese/ 

Seleni/Zn 0.5 ml/ Insulin Human Regular 40 unit/ Potassium Chloride 20 meq/ 

Total Parenteral Nutrition/Amino Acids/Dextrose/ Fat Emulsion Intravenous 1,400 

ml @  58.333 mls/ hr TPN  CONT IV  Last administered on 4/14/20at 21:52;  Start 

4/14/20 at 22:00;  Stop 4/15/20 at 21:59;  Status DC


Sodium Chloride (Normal Saline Flush) 10 ml QSHIFT  PRN IV AFTER MEDS AND BLOOD 

DRAWS;  Start 4/14/20 at 15:00;  Stop 5/12/20 at 11:27;  Status DC


Sodium Chloride (Normal Saline Flush) 10 ml PRN Q5MIN  PRN IV AFTER MEDS AND 

BLOOD DRAWS;  Start 4/14/20 at 15:00


Sodium Chloride (Normal Saline Flush) 20 ml PRN Q5MIN  PRN IV AFTER MEDS AND BL

OOD DRAWS;  Start 4/14/20 at 15:00


Sodium Chloride 100 meq/Potassium Phosphate 19 mmol/ Magnesium Sulfate 12 

meq/Calcium Gluconate 15 meq/ Multivitamins 10 ml/Chromium/ Copper/Manganese/ 

Seleni/Zn 0.5 ml/ Insulin Human Regular 40 unit/ Potassium Chloride 20 meq/ 

Total Parenteral Nutrition/Amino Acids/Dextrose/ Fat Emulsion Intravenous 1,400 

ml @  58.333 mls/ hr TPN  CONT IV  Last administered on 4/15/20at 21:20;  Start 

4/15/20 at 22:00;  Stop 4/16/20 at 21:59;  Status DC


Lidocaine HCl (Buffered Lidocaine 1%) 3 ml STK-MED ONCE .ROUTE ;  Start 4/15/20 

at 13:16;  Stop 4/15/20 at 13:16;  Status DC


Lidocaine HCl (Buffered Lidocaine 1%) 6 ml 1X  ONCE INJ  Last administered on 

4/15/20at 13:45;  Start 4/15/20 at 13:30;  Stop 4/15/20 at 13:31;  Status DC


Albumin Human 100 ml @  100 mls/hr 1X  ONCE IV  Last administered on 4/15/20at 

15:41;  Start 4/15/20 at 15:00;  Stop 4/15/20 at 15:59;  Status DC


Albumin Human 50 ml @ 50 mls/hr 1X  ONCE IV  Last administered on 4/15/20at 

15:00;  Start 4/15/20 at 15:00;  Stop 4/15/20 at 15:59;  Status DC


Info (PHARMACY MONITORING -- do not chart) 1 each PRN DAILY  PRN MC SEE 

COMMENTS;  Start 4/16/20 at 11:30;  Status Cancel


Info (PHARMACY MONITORING -- do not chart) 1 each PRN DAILY  PRN MC SEE 

COMMENTS;  Start 4/16/20 at 11:30;  Status UNV


Sodium Chloride 100 meq/Potassium Phosphate 10 mmol/ Magnesium Sulfate 12 

meq/Calcium Gluconate 15 meq/ Multivitamins 10 ml/Chromium/ Copper/Manganese/ 

Seleni/Zn 0.5 ml/ Insulin Human Regular 35 unit/ Potassium Chloride 20 meq/ 

Total Parenteral Nutrition/Amino Acids/Dextrose/ Fat Emulsion Intravenous 1,400 

ml @  58.333 mls/ hr TPN  CONT IV  Last administered on 4/16/20at 22:10;  Start 

4/16/20 at 22:00;  Stop 4/17/20 at 21:59;  Status DC


Sodium Chloride 100 meq/Potassium Phosphate 5 mmol/ Magnesium Sulfate 12 

meq/Calcium Gluconate 15 meq/ Multivitamins 10 ml/Chromium/ Copper/Manganese/ 

Seleni/Zn 0.5 ml/ Insulin Human Regular 35 unit/ Potassium Chloride 20 meq/ 

Total Parenteral Nutrition/Amino Acids/Dextrose/ Fat Emulsion Intravenous 1,400 

ml @  58.333 mls/ hr TPN  CONT IV  Last administered on 4/17/20at 22:59;  Start 

4/17/20 at 22:00;  Stop 4/18/20 at 21:59;  Status DC


Sodium Chloride 1,000 ml @  1,000 mls/hr Q1H PRN IV hypotension;  Start 4/18/20 

at 08:27;  Stop 4/18/20 at 14:26;  Status DC


Albumin Human 200 ml @  200 mls/hr 1X PRN  PRN IV Hypotension Last administered 

on 4/18/20at 09:18;  Start 4/18/20 at 08:30;  Stop 4/18/20 at 14:29;  Status DC


Sodium Chloride 1,000 ml @  400 mls/hr Q2H30M PRN IV PATENCY;  Start 4/18/20 at 

08:27;  Stop 4/18/20 at 20:26;  Status DC


Info (PHARMACY MONITORING -- do not chart) 1 each PRN DAILY  PRN MC SEE 

COMMENTS;  Start 4/18/20 at 08:30;  Status Cancel


Info (PHARMACY MONITORING -- do not chart) 1 each PRN DAILY  PRN MC SEE 

COMMENTS;  Start 4/18/20 at 08:30;  Stop 4/26/20 at 13:10;  Status DC


Sodium Chloride 100 meq/Potassium Chloride 40 meq/ Magnesium Sulfate 15 

meq/Calcium Gluconate 15 meq/ Multivitamins 10 ml/Chromium/ Copper/Manganese/ 

Seleni/Zn 0.5 ml/ Insulin Human Regular 35 unit/ Total Parenteral 

Nutrition/Amino Acids/Dextrose/ Fat Emulsion Intravenous 1,400 ml @  58.333 mls/

hr TPN  CONT IV  Last administered on 4/18/20at 22:00;  Start 4/18/20 at 22:00; 

Stop 4/19/20 at 21:59;  Status DC


Potassium Chloride/Water 100 ml @  100 mls/hr 1X  ONCE IV  Last administered on 

4/18/20at 17:28;  Start 4/18/20 at 14:45;  Stop 4/18/20 at 15:44;  Status DC


Sodium Chloride 100 meq/Potassium Chloride 40 meq/ Magnesium Sulfate 15 

meq/Calcium Gluconate 15 meq/ Multivitamins 10 ml/Chromium/ Copper/Manganese/ 

Seleni/Zn 0.5 ml/ Insulin Human Regular 35 unit/ Total Parenteral 

Nutrition/Amino Acids/Dextrose/ Fat Emulsion Intravenous 1,400 ml @  58.333 mls/

hr TPN  CONT IV  Last administered on 4/19/20at 22:46;  Start 4/19/20 at 22:00; 

Stop 4/20/20 at 21:59;  Status DC


Sodium Chloride 100 meq/Potassium Chloride 40 meq/ Magnesium Sulfate 20 

meq/Calcium Gluconate 15 meq/ Multivitamins 10 ml/Chromium/ Copper/Manganese/ 

Seleni/Zn 0.5 ml/ Insulin Human Regular 35 unit/ Total Parenteral 

Nutrition/Amino Acids/Dextrose/ Fat Emulsion Intravenous 1,400 ml @  58.333 mls/

hr TPN  CONT IV  Last administered on 4/20/20at 22:31;  Start 4/20/20 at 22:00; 

Stop 4/21/20 at 21:59;  Status DC


Fentanyl Citrate (Fentanyl 2ml Vial) 50 mcg PRN Q2HR  PRN IVP PAIN Last 

administered on 4/27/20at 13:32;  Start 4/20/20 at 21:00;  Stop 4/28/20 at 

12:53;  Status DC


Fentanyl Citrate (Fentanyl 2ml Vial) 25 mcg PRN Q2HR  PRN IVP PAIN;  Start 

4/20/20 at 21:00;  Stop 4/28/20 at 12:54;  Status DC


Enoxaparin Sodium (Lovenox 100mg Syringe) 100 mg Q12HR SQ ;  Start 4/21/20 at 

21:00;  Status UNV


Amino Acids/ Glycerin/ Electrolytes 1,000 ml @  75 mls/hr [id number] IV ;  Start 

4/20/20 at 21:15;  Status UNV


Sodium Chloride 1,000 ml @  1,000 mls/hr Q1H PRN IV hypotension;  Start 4/21/20 

at 07:56;  Stop 4/21/20 at 13:55;  Status DC


Albumin Human 200 ml @  200 mls/hr 1X PRN  PRN IV Hypotension Last administered 

on 4/21/20at 08:40;  Start 4/21/20 at 08:00;  Stop 4/21/20 at 13:59;  Status DC


Sodium Chloride 1,000 ml @  400 mls/hr Q2H30M PRN IV PATENCY;  Start 4/21/20 at 

07:56;  Stop 4/21/20 at 19:55;  Status DC


Info (PHARMACY MONITORING -- do not chart) 1 each PRN DAILY  PRN MC SEE 

COMMENTS;  Start 4/21/20 at 08:00;  Status UNV


Info (PHARMACY MONITORING -- do not chart) 1 each PRN DAILY  PRN MC SEE 

COMMENTS;  Start 4/21/20 at 08:00;  Status UNV


Daptomycin 430 mg/ Sodium Chloride 50 ml @  100 mls/hr Q24H IV  Last 

administered on 4/21/20at 12:35;  Start 4/21/20 at 09:00;  Stop 4/21/20 at 

12:49;  Status DC


Sodium Chloride 100 meq/Potassium Chloride 40 meq/ Magnesium Sulfate 20 

meq/Calcium Gluconate 15 meq/ Multivitamins 10 ml/Chromium/ Copper/Manganese/ 

Seleni/Zn 0.5 ml/ Insulin Human Regular 35 unit/ Total Parenteral Nutritio

n/Amino Acids/Dextrose/ Fat Emulsion Intravenous 1,400 ml @  58.333 mls/ hr TPN 

CONT IV  Last administered on 4/21/20at 21:26;  Start 4/21/20 at 22:00;  Stop 

4/22/20 at 21:59;  Status DC


Daptomycin 430 mg/ Sodium Chloride 50 ml @  100 mls/hr Q48H IV ;  Start 4/23/20 

at 09:00;  Stop 4/22/20 at 11:55;  Status DC


Sodium Chloride 100 meq/Potassium Chloride 40 meq/ Magnesium Sulfate 20 

meq/Calcium Gluconate 15 meq/ Multivitamins 10 ml/Chromium/ Copper/Manganese/ 

Seleni/Zn 0.5 ml/ Insulin Human Regular 35 unit/ Total Parenteral 

Nutrition/Amino Acids/Dextrose/ Fat Emulsion Intravenous 1,400 ml @  58.333 mls/

hr TPN  CONT IV  Last administered on 4/22/20at 22:27;  Start 4/22/20 at 22:00; 

Stop 4/23/20 at 21:59;  Status DC


Daptomycin 430 mg/ Sodium Chloride 50 ml @  100 mls/hr Q24H IV  Last 

administered on 4/24/20at 15:07;  Start 4/22/20 at 13:00;  Stop 4/25/20 at 

13:15;  Status DC


Sodium Chloride 100 meq/Potassium Chloride 40 meq/ Magnesium Sulfate 20 

meq/Calcium Gluconate 10 meq/ Multivitamins 10 ml/Chromium/ Copper/Manganese/ 

Seleni/Zn 0.5 ml/ Insulin Human Regular 35 unit/ Total Parenteral 

Nutrition/Amino Acids/Dextrose/ Fat Emulsion Intravenous 1,400 ml @  58.333 mls/

hr TPN  CONT IV  Last administered on 4/24/20at 00:06;  Start 4/23/20 at 22:00; 

Stop 4/24/20 at 21:59;  Status DC


Alteplase, Recombinant (Cathflo For Central Catheter Clearance) 1 mg 1X  ONCE 

INT CAT  Last administered on 4/24/20at 11:44;  Start 4/24/20 at 10:45;  Stop 

4/24/20 at 10:46;  Status DC


Ondansetron HCl (Zofran) 4 mg PRN Q6HRS  PRN IV NAUSEA/VOMITING;  Start 4/27/20 

at 07:00;  Stop 4/28/20 at 06:59;  Status DC


Fentanyl Citrate (Fentanyl 2ml Vial) 25 mcg PRN Q5MIN  PRN IV MILD PAIN 1-3;  

Start 4/27/20 at 07:00;  Stop 4/28/20 at 06:59;  Status DC


Fentanyl Citrate (Fentanyl 2ml Vial) 50 mcg PRN Q5MIN  PRN IV MODERATE TO SEVERE

PAIN Last administered on 4/27/20at 10:17;  Start 4/27/20 at 07:00;  Stop 

4/28/20 at 06:59;  Status DC


Ringer's Solution 1,000 ml @  30 mls/hr Q24H IV ;  Start 4/27/20 at 07:00;  Stop

4/27/20 at 18:59;  Status DC


Lidocaine HCl (Xylocaine-Mpf 1% 2ml Vial) 2 ml PRN 1X  PRN ID PRIOR TO IV START;

 Start 4/27/20 at 07:00;  Stop 4/28/20 at 06:59;  Status DC


Prochlorperazine Edisylate (Compazine) 5 mg PACU PRN  PRN IV NAUSEA, MRX1;  

Start 4/27/20 at 07:00;  Stop 4/28/20 at 06:59;  Status DC


Sodium Acetate 50 meq/Potassium Acetate 55 meq/ Magnesium Sulfate 20 meq/Calcium

Gluconate 10 meq/ Multivitamins 10 ml/Chromium/ Copper/Manganese/ Seleni/Zn 0.5 

ml/ Insulin Human Regular 35 unit/ Total Parenteral Nutrition/Amino 

Acids/Dextrose/ Fat Emulsion Intravenous 1,400 ml @  58.333 mls/ hr TPN  CONT IV

;  Start 4/24/20 at 22:00;  Stop 4/24/20 at 14:15;  Status DC


Sodium Acetate 50 meq/Potassium Acetate 55 meq/ Magnesium Sulfate 20 meq/Calcium

Gluconate 10 meq/ Multivitamins 10 ml/Chromium/ Copper/Manganese/ Seleni/Zn 0.5 

ml/ Insulin Human Regular 35 unit/ Total Parenteral Nutrition/Amino 

Acids/Dextrose/ Fat Emulsion Intravenous 1,800 ml @  75 mls/hr TPN  CONT IV  

Last administered on 4/24/20at 22:38;  Start 4/24/20 at 22:00;  Stop 4/25/20 at 

21:59;  Status DC


Sodium Chloride 1,000 ml @  1,000 mls/hr Q1H PRN IV hypotension;  Start 4/24/20 

at 15:31;  Stop 4/24/20 at 21:30;  Status DC


Diphenhydramine HCl (Benadryl) 25 mg 1X PRN  PRN IV ITCHING;  Start 4/24/20 at 

15:45;  Stop 4/25/20 at 15:44;  Status DC


Diphenhydramine HCl (Benadryl) 25 mg 1X PRN  PRN IV ITCHING;  Start 4/24/20 at 

15:45;  Stop 4/25/20 at 15:44;  Status DC


Sodium Chloride 1,000 ml @  400 mls/hr Q2H30M PRN IV PATENCY;  Start 4/24/20 at 

15:31;  Stop 4/25/20 at 03:30;  Status DC


Info (PHARMACY MONITORING -- do not chart) 1 each PRN DAILY  PRN MC SEE 

COMMENTS;  Start 4/24/20 at 15:45


Sodium Acetate 50 meq/Potassium Acetate 55 meq/ Magnesium Sulfate 20 meq/Calcium

Gluconate 10 meq/ Multivitamins 10 ml/Chromium/ Copper/Manganese/ Seleni/Zn 0.5 

ml/ Insulin Human Regular 35 unit/ Total Parenteral Nutrition/Amino 

Acids/Dextrose/ Fat Emulsion Intravenous 1,800 ml @  75 mls/hr TPN  CONT IV  

Last administered on 4/25/20at 22:03;  Start 4/25/20 at 22:00;  Stop 4/26/20 at 

21:59;  Status DC


Daptomycin 430 mg/ Sodium Chloride 50 ml @  100 mls/hr Q24H IV  Last 

administered on 4/30/20at 13:00;  Start 4/25/20 at 13:00;  Stop 4/30/20 at 

20:58;  Status DC


Heparin Sodium (Porcine) 1000 unit/Sodium Chloride 1,001 ml @  1,001 mls/hr 1X  

ONCE IRR ;  Start 4/27/20 at 06:00;  Stop 4/27/20 at 06:59;  Status DC


Potassium Acetate 55 meq/Magnesium Sulfate 20 meq/ Calcium Gluconate 10 meq/ 

Multivitamins 10 ml/Chromium/ Copper/Manganese/ Seleni/Zn 0.5 ml/ Insulin Human 

Regular 35 unit/ Total Parenteral Nutrition/Amino Acids/Dextrose/ Fat Emulsion 

Intravenous 1,920 ml @  80 mls/hr TPN  CONT IV  Last administered on 4/26/20at 

22:10;  Start 4/26/20 at 22:00;  Stop 4/27/20 at 21:59;  Status DC


Dexamethasone Sodium Phosphate (Decadron) 4 mg STK-MED ONCE .ROUTE ;  Start 4/ 27/20 at 10:56;  Stop 4/27/20 at 10:57;  Status DC


Ondansetron HCl (Zofran) 4 mg STK-MED ONCE .ROUTE ;  Start 4/27/20 at 10:56;  

Stop 4/27/20 at 10:57;  Status DC


Rocuronium Bromide (Zemuron) 50 mg STK-MED ONCE .ROUTE ;  Start 4/27/20 at 

10:56;  Stop 4/27/20 at 10:57;  Status DC


Fentanyl Citrate (Fentanyl 2ml Vial) 100 mcg STK-MED ONCE .ROUTE ;  Start 

4/27/20 at 10:56;  Stop 4/27/20 at 10:57;  Status DC


Bupivacaine HCl/ Epinephrine Bitart (Sensorcain-Epi 0.5%-1:489455 Mpf) 30 ml 

STK-MED ONCE .ROUTE  Last administered on 4/27/20at 12:01;  Start 4/27/20 at 

10:58;  Stop 4/27/20 at 10:58;  Status DC


Cellulose (Surgicel Hemostat 2x14) 1 each STK-MED ONCE .ROUTE ;  Start 4/27/20 

at 10:58;  Stop 4/27/20 at 10:59;  Status DC


Iohexol (Omnipaque 300 Mg/ml) 50 ml STK-MED ONCE .ROUTE ;  Start 4/27/20 at 

10:58;  Stop 4/27/20 at 10:59;  Status DC


Cellulose (Surgicel Hemostat 4x8) 1 each STK-MED ONCE .ROUTE ;  Start 4/27/20 at

10:58;  Stop 4/27/20 at 10:59;  Status DC


Bisacodyl (Dulcolax Supp) 10 mg STK-MED ONCE .ROUTE ;  Start 4/27/20 at 10:59;  

Stop 4/27/20 at 10:59;  Status DC


Heparin Sodium (Porcine) 1000 unit/Sodium Chloride 1,001 ml @  1,001 mls/hr 1X  

ONCE IRR ;  Start 4/27/20 at 12:00;  Stop 4/27/20 at 12:59;  Status DC


Propofol 20 ml @ As Directed STK-MED ONCE IV ;  Start 4/27/20 at 11:05;  Stop 

4/27/20 at 11:05;  Status DC


Sevoflurane (Ultane) 90 ml STK-MED ONCE IH ;  Start 4/27/20 at 11:05;  Stop 

4/27/20 at 11:05;  Status DC


Sevoflurane (Ultane) 60 ml STK-MED ONCE IH ;  Start 4/27/20 at 12:26;  Stop 

4/27/20 at 12:27;  Status DC


Propofol 20 ml @ As Directed STK-MED ONCE IV ;  Start 4/27/20 at 12:26;  Stop 

4/27/20 at 12:27;  Status DC


Phenylephrine HCl (PHENYLEPHRINE in 0.9% NACL PF) 1 mg STK-MED ONCE IV ;  Start 

4/27/20 at 12:34;  Stop 4/27/20 at 12:34;  Status DC


Heparin Sodium (Porcine) (Heparin Sodium) 5,000 unit Q12HR SQ  Last administered

on 5/6/20at 20:57;  Start 4/27/20 at 21:00;  Stop 5/7/20 at 09:59;  Status DC


Sodium Chloride (Normal Saline Flush) 3 ml QSHIFT  PRN IV AFTER MEDS AND BLOOD 

DRAWS;  Start 4/27/20 at 13:45


Naloxone HCl (Narcan) 0.4 mg PRN Q2MIN  PRN IV SEE INSTRUCTIONS;  Start 4/27/20 

at 13:45


Sodium Chloride 1,000 ml @  25 mls/hr Q24H IV  Last administered on 5/19/20at 

13:37;  Start 4/27/20 at 13:37


Naloxone HCl (Narcan) 0.4 mg PRN Q2MIN  PRN IV SEE INSTRUCTIONS;  Start 4/27/20 

at 14:30;  Status UNV


Sodium Chloride 1,000 ml @  25 mls/hr Q24H IV ;  Start 4/27/20 at 14:30;  Status

UNV


Hydromorphone HCl 30 ml @ 0 mls/hr CONT PRN  PRN IV PER PROTOCOL Last 

administered on 5/2/20at 16:08;  Start 4/27/20 at 14:30;  Stop 5/4/20 at 08:55; 

Status DC


Potassium Acetate 55 meq/Magnesium Sulfate 20 meq/ Calcium Gluconate 10 meq/ 

Multivitamins 10 ml/Chromium/ Copper/Manganese/ Seleni/Zn 0.5 ml/ Insulin Human 

Regular 35 unit/ Total Parenteral Nutrition/Amino Acids/Dextrose/ Fat Emulsion 

Intravenous 1,920 ml @  80 mls/hr TPN  CONT IV  Last administered on 4/27/20at 

22:01;  Start 4/27/20 at 22:00;  Stop 4/28/20 at 21:59;  Status DC


Bumetanide (Bumex) 2 mg [id number] IV  Last administered on 5/1/20at 13:50;  Start 

4/28/20 at 14:00;  Stop 5/2/20 at 14:10;  Status DC


Meropenem 1 gm/ Sodium Chloride 100 ml @  200 mls/hr Q8HRS IV  Last administered

on 5/22/20at 05:53;  Start 4/28/20 at 14:00;  Stop 5/22/20 at 09:31;  Status DC


Potassium Acetate 55 meq/Magnesium Sulfate 20 meq/ Calcium Gluconate 10 meq/ 

Multivitamins 10 ml/Chromium/ Copper/Manganese/ Seleni/Zn 0.5 ml/ Insulin Human 

Regular 35 unit/ Total Parenteral Nutrition/Amino Acids/Dextrose/ Fat Emulsion 

Intravenous 1,920 ml @  80 mls/hr TPN  CONT IV  Last administered on 4/28/20at 

22:02;  Start 4/28/20 at 22:00;  Stop 4/29/20 at 21:59;  Status DC


Hydromorphone HCl (Dilaudid Standard PCA) 12 mg STK-MED ONCE IV ;  Start 4/27/20

at 14:35;  Stop 4/28/20 at 13:53;  Status DC


Artificial Tears (Artificial Tears) 1 drop PRN Q15MIN  PRN OU DRY EYE Last 

administered on 5/24/20at 11:15;  Start 4/29/20 at 05:30


Hydromorphone HCl (Dilaudid Standard PCA) 12 mg STK-MED ONCE IV ;  Start 4/28/20

at 12:05;  Stop 4/29/20 at 09:15;  Status DC


Potassium Acetate 65 meq/Magnesium Sulfate 20 meq/ Calcium Gluconate 10 meq/ 

Multivitamins 10 ml/Chromium/ Copper/Manganese/ Seleni/Zn 0.5 ml/ Insulin Human 

Regular 30 unit/ Total Parenteral Nutrition/Amino Acids/Dextrose/ Fat Emulsion 

Intravenous 1,920 ml @  80 mls/hr TPN  CONT IV  Last administered on 4/29/20at 

22:22;  Start 4/29/20 at 22:00;  Stop 4/30/20 at 21:59;  Status DC


Cyclobenzaprine HCl (Flexeril) 10 mg PRN Q6HRS  PRN PO MUSCLE SPASMS;  Start 

4/30/20 at 10:45


Potassium Acetate 55 meq/Magnesium Sulfate 20 meq/ Calcium Gluconate 10 meq/ 

Multivitamins 10 ml/Chromium/ Copper/Manganese/ Seleni/Zn 0.5 ml/ Insulin Human 

Regular 30 unit/ Total Parenteral Nutrition/Amino Acids/Dextrose/ Fat Emulsion 

Intravenous 1,920 ml @  80 mls/hr TPN  CONT IV  Last administered on 5/1/20at 

01:00;  Start 4/30/20 at 22:00;  Stop 5/1/20 at 21:59;  Status DC


Magnesium Sulfate 50 ml @ 25 mls/hr 1X  ONCE IV  Last administered on 4/30/20at 

17:18;  Start 4/30/20 at 12:45;  Stop 4/30/20 at 14:44;  Status DC


Potassium Chloride/Water 100 ml @  100 mls/hr 1X  ONCE IV  Last administered on 

5/1/20at 11:27;  Start 5/1/20 at 12:00;  Stop 5/1/20 at 12:59;  Status DC


Hydromorphone HCl (Dilaudid Standard PCA) 12 mg STK-MED ONCE IV ;  Start 4/29/20

at 10:50;  Stop 5/1/20 at 11:02;  Status DC


Hydromorphone HCl (Dilaudid Standard PCA) 12 mg STK-MED ONCE IV ;  Start 4/30/20

at 13:47;  Stop 5/1/20 at 11:03;  Status DC


Potassium Acetate 30 meq/Magnesium Sulfate 20 meq/ Calcium Gluconate 10 meq/ 

Multivitamins 10 ml/Chromium/ Copper/Manganese/ Seleni/Zn 0.5 ml/ Insulin Human 

Regular 30 unit/ Potassium Chloride 30 meq/ Total Parenteral Nutrition/Amino 

Acids/Dextrose/ Fat Emulsion Intravenous 1,920 ml @  80 mls/hr TPN  CONT IV  

Last administered on 5/1/20at 22:34;  Start 5/1/20 at 22:00;  Stop 5/2/20 at 

21:59;  Status DC


Potassium Chloride/Water 100 ml @  100 mls/hr Q1H IV  Last administered on 

5/2/20at 13:05;  Start 5/2/20 at 07:00;  Stop 5/2/20 at 10:59;  Status DC


Magnesium Sulfate 50 ml @ 25 mls/hr 1X  ONCE IV  Last administered on 5/2/20at 

10:34;  Start 5/2/20 at 10:30;  Stop 5/2/20 at 12:29;  Status DC


Potassium Chloride 75 meq/ Magnesium Sulfate 20 meq/Calcium Gluconate 10 meq/ 

Multivitamins 10 ml/Chromium/ Copper/Manganese/ Seleni/Zn 0.5 ml/ Insulin Human 

Regular 30 unit/ Total Parenteral Nutrition/Amino Acids/Dextrose/ Fat Emulsion 

Intravenous 1,920 ml @  80 mls/hr TPN  CONT IV  Last administered on 5/2/20at 

21:51;  Start 5/2/20 at 22:00;  Stop 5/3/20 at 22:00;  Status DC


Potassium Chloride 75 meq/ Magnesium Sulfate 20 meq/Calcium Gluconate 10 meq/ 

Multivitamins 10 ml/Chromium/ Copper/Manganese/ Seleni/Zn 0.5 ml/ Insulin Human 

Regular 25 unit/ Total Parenteral Nutrition/Amino Acids/Dextrose/ Fat Emulsion 

Intravenous 1,920 ml @  80 mls/hr TPN  CONT IV  Last administered on 5/3/20at 

22:04;  Start 5/3/20 at 22:00;  Stop 5/4/20 at 21:59;  Status DC


Hydromorphone HCl (Dilaudid) 0.4 mg PRN Q4HRS  PRN IVP PAIN Last administered on

5/4/20at 10:57;  Start 5/4/20 at 09:00;  Stop 5/4/20 at 18:59;  Status DC


Micafungin Sodium 100 mg/Dextrose 100 ml @  100 mls/hr Q24H IV  Last 

administered on 5/25/20at 11:11;  Start 5/4/20 at 11:00


Daptomycin 485 mg/ Sodium Chloride 50 ml @  100 mls/hr Q24H IV  Last 

administered on 5/11/20at 13:10;  Start 5/4/20 at 11:00;  Stop 5/12/20 at 07:44;

 Status DC


Potassium Chloride 75 meq/ Magnesium Sulfate 15 meq/Calcium Gluconate 8 meq/ 

Multivitamins 10 ml/Chromium/ Copper/Manganese/ Seleni/Zn 0.5 ml/ Insulin Human 

Regular 25 unit/ Total Parenteral Nutrition/Amino Acids/Dextrose/ Fat Emulsion 

Intravenous 1,920 ml @  80 mls/hr TPN  CONT IV  Last administered on 5/4/20at 

23:08;  Start 5/4/20 at 22:00;  Stop 5/5/20 at 21:59;  Status DC


Haloperidol Lactate (Haldol Inj) 3 mg 1X  ONCE IVP  Last administered on 5/4/2

0at 14:37;  Start 5/4/20 at 14:30;  Stop 5/4/20 at 14:31;  Status DC


Hydromorphone HCl (Dilaudid) 1 mg PRN Q4HRS  PRN IVP PAIN Last administered on 

5/18/20at 06:25;  Start 5/4/20 at 19:00;  Stop 5/18/20 at 17:10;  Status DC


Potassium Chloride 75 meq/ Magnesium Sulfate 15 meq/Calcium Gluconate 8 meq/ 

Multivitamins 10 ml/Chromium/ Copper/Manganese/ Seleni/Zn 0.5 ml/ Insulin Human 

Regular 20 unit/ Total Parenteral Nutrition/Amino Acids/Dextrose/ Fat Emulsion 

Intravenous 1,920 ml @  80 mls/hr TPN  CONT IV  Last administered on 5/5/20at 

22:10;  Start 5/5/20 at 22:00;  Stop 5/6/20 at 21:59;  Status DC


Lidocaine HCl (Buffered Lidocaine 1%) 3 ml STK-MED ONCE .ROUTE ;  Start 5/6/20 

at 11:31;  Stop 5/6/20 at 11:31;  Status DC


Lidocaine HCl (Buffered Lidocaine 1%) 3 ml STK-MED ONCE .ROUTE ;  Start 5/6/20 

at 12:28;  Stop 5/6/20 at 12:29;  Status DC


Lidocaine HCl (Buffered Lidocaine 1%) 6 ml 1X  ONCE INJ  Last administered on 

5/6/20at 12:53;  Start 5/6/20 at 12:45;  Stop 5/6/20 at 12:46;  Status DC


Potassium Chloride 75 meq/ Magnesium Sulfate 15 meq/Calcium Gluconate 8 meq/ 

Multivitamins 10 ml/Chromium/ Copper/Manganese/ Seleni/Zn 0.5 ml/ Insulin Human 

Regular 20 unit/ Total Parenteral Nutrition/Amino Acids/Dextrose/ Fat Emulsion 

Intravenous 1,920 ml @  80 mls/hr TPN  CONT IV  Last administered on 5/6/20at 

22:00;  Start 5/6/20 at 22:00;  Stop 5/7/20 at 21:59;  Status DC


Potassium Chloride 75 meq/ Magnesium Sulfate 15 meq/Calcium Gluconate 8 meq/ 

Multivitamins 10 ml/Chromium/ Copper/Manganese/ Seleni/Zn 0.5 ml/ Insulin Human 

Regular 15 unit/ Total Parenteral Nutrition/Amino Acids/Dextrose/ Fat Emulsion 

Intravenous 1,920 ml @  80 mls/hr TPN  CONT IV  Last administered on 5/7/20at 

22:28;  Start 5/7/20 at 22:00;  Stop 5/8/20 at 21:59;  Status DC


Vecuronium Bromide (Norcuron Bolus) 6 mg PRN Q6HRS  PRN IV VENT ASYNCHRONY;  

Start 5/7/20 at 19:15;  Stop 5/7/20 at 19:35;  Status DC


Bumetanide (Bumex) 2 mg 1X  ONCE IV  Last administered on 5/7/20at 22:09;  Start

5/7/20 at 19:45;  Stop 5/7/20 at 19:46;  Status DC


Lidocaine HCl (Buffered Lidocaine 1%) 3 ml STK-MED ONCE .ROUTE ;  Start 5/8/20 

at 07:59;  Stop 5/8/20 at 07:59;  Status DC


Midazolam HCl (Versed) 5 mg STK-MED ONCE .ROUTE ;  Start 5/8/20 at 08:36;  Stop 

5/8/20 at 08:36;  Status DC


Fentanyl Citrate (Fentanyl 5ml Vial) 250 mcg STK-MED ONCE .ROUTE ;  Start 5/8/20

at 08:36;  Stop 5/8/20 at 08:37;  Status DC


Lidocaine HCl (Buffered Lidocaine 1%) 3 ml 1X  ONCE IJ  Last administered on 

5/8/20at 09:30;  Start 5/8/20 at 09:15;  Stop 5/8/20 at 09:16;  Status DC


Midazolam HCl (Versed) 5 mg 1X  ONCE IV  Last administered on 5/8/20at 09:30;  

Start 5/8/20 at 09:15;  Stop 5/8/20 at 09:16;  Status DC


Fentanyl Citrate (Fentanyl 5ml Vial) 250 mcg 1X  ONCE IV  Last administered on 

5/8/20at 09:30;  Start 5/8/20 at 09:15;  Stop 5/8/20 at 09:16;  Status DC


Bumetanide (Bumex) 2 mg DAILY IV  Last administered on 5/18/20at 08:07;  Start 

5/8/20 at 10:00;  Stop 5/18/20 at 17:15;  Status DC


Potassium Chloride 75 meq/ Magnesium Sulfate 15 meq/ Multivitamins 10 

ml/Chromium/ Copper/Manganese/ Seleni/Zn 0.5 ml/ Insulin Human Regular 15 unit/ 

Total Parenteral Nutrition/Amino Acids/Dextrose/ Fat Emulsion Intravenous 1,920 

ml @  80 mls/hr TPN  CONT IV  Last administered on 5/8/20at 21:59;  Start 5/8/20

at 22:00;  Stop 5/9/20 at 21:59;  Status DC


Metoclopramide HCl (Reglan Vial) 10 mg PRN Q3HRS  PRN IVP NAUSEA/VOMITING-3rd 

choice Last administered on 5/14/20at 04:25;  Start 5/9/20 at 16:45


Potassium Chloride 75 meq/ Magnesium Sulfate 15 meq/ Multivitamins 10 

ml/Chromium/ Copper/Manganese/ Seleni/Zn 0.5 ml/ Insulin Human Regular 15 unit/ 

Total Parenteral Nutrition/Amino Acids/Dextrose/ Fat Emulsion Intravenous 1,920 

ml @  80 mls/hr TPN  CONT IV  Last administered on 5/9/20at 22:41;  Start 5/9/20

at 22:00;  Stop 5/10/20 at 21:59;  Status DC


Magnesium Sulfate 50 ml @ 25 mls/hr 1X  ONCE IV  Last administered on 5/10/20at 

10:44;  Start 5/10/20 at 09:00;  Stop 5/10/20 at 10:59;  Status DC


Potassium Chloride/Water 100 ml @  100 mls/hr 1X  ONCE IV  Last administered on 

5/10/20at 09:37;  Start 5/10/20 at 09:00;  Stop 5/10/20 at 09:59;  Status DC


Duloxetine HCl (Cymbalta) 30 mg DAILY PO  Last administered on 5/11/20at 09:48; 

Start 5/10/20 at 14:00;  Stop 5/13/20 at 10:25;  Status DC


Potassium Chloride 80 meq/ Magnesium Sulfate 20 meq/ Multivitamins 10 

ml/Chromium/ Copper/Manganese/ Seleni/Zn 0.5 ml/ Insulin Human Regular 15 unit/ 

Total Parenteral Nutrition/Amino Acids/Dextrose/ Fat Emulsion Intravenous 1,920 

ml @  80 mls/hr TPN  CONT IV  Last administered on 5/10/20at 21:42;  Start 

5/10/20 at 22:00;  Stop 5/11/20 at 21:59;  Status DC


Potassium Chloride 80 meq/ Magnesium Sulfate 20 meq/ Multivitamins 10 

ml/Chromium/ Copper/Manganese/ Seleni/Zn 0.5 ml/ Insulin Human Regular 15 unit/ 

Total Parenteral Nutrition/Amino Acids/Dextrose/ Fat Emulsion Intravenous 1,920 

ml @  80 mls/hr TPN  CONT IV  Last administered on 5/11/20at 22:20;  Start 

5/11/20 at 22:00;  Stop 5/12/20 at 21:59;  Status DC


Lidocaine HCl (Buffered Lidocaine 1%) 3 ml STK-MED ONCE .ROUTE ;  Start 5/12/20 

at 09:54;  Stop 5/12/20 at 09:55;  Status DC


Hydromorphone HCl (Dilaudid Standard PCA) 12 mg STK-MED ONCE IV ;  Start 5/1/20 

at 15:50;  Stop 5/12/20 at 11:24;  Status DC


Potassium Chloride 80 meq/ Magnesium Sulfate 20 meq/ Multivitamins 10 

ml/Chromium/ Copper/Manganese/ Seleni/Zn 0.5 ml/ Insulin Human Regular 15 unit/ 

Total Parenteral Nutrition/Amino Acids/Dextrose/ Fat Emulsion Intravenous 1,920 

ml @  80 mls/hr TPN  CONT IV  Last administered on 5/12/20at 21:40;  Start 

5/12/20 at 22:00;  Stop 5/13/20 at 21:59;  Status DC


Lidocaine HCl (Buffered Lidocaine 1%) 6 ml 1X  ONCE INJ  Last administered on 

5/12/20at 14:15;  Start 5/12/20 at 14:15;  Stop 5/12/20 at 14:16;  Status DC


Potassium Chloride 80 meq/ Magnesium Sulfate 20 meq/ Multivitamins 10 

ml/Chromium/ Copper/Manganese/ Seleni/Zn 1 ml/ Insulin Human Regular 15 unit/ 

Total Parenteral Nutrition/Amino Acids/Dextrose/ Fat Emulsion Intravenous 1,920 

ml @  80 mls/hr TPN  CONT IV  Last administered on 5/13/20at 22:04;  Start 

5/13/20 at 22:00;  Stop 5/14/20 at 21:59;  Status DC


Potassium Chloride/Water 100 ml @  100 mls/hr 1X  ONCE IV  Last administered on 

5/14/20at 11:34;  Start 5/14/20 at 11:00;  Stop 5/14/20 at 11:59;  Status DC


Potassium Chloride 90 meq/ Magnesium Sulfate 20 meq/ Multivitamins 10 

ml/Chromium/ Copper/Manganese/ Seleni/Zn 1 ml/ Insulin Human Regular 15 unit/ 

Total Parenteral Nutrition/Amino Acids/Dextrose/ Fat Emulsion Intravenous 1,920 

ml @  80 mls/hr TPN  CONT IV  Last administered on 5/14/20at 22:57;  Start 

5/14/20 at 22:00;  Stop 5/15/20 at 21:59;  Status DC


Potassium Chloride 90 meq/ Magnesium Sulfate 20 meq/ Multivitamins 10 

ml/Chromium/ Copper/Manganese/ Seleni/Zn 1 ml/ Insulin Human Regular 15 unit/ 

Total Parenteral Nutrition/Amino Acids/Dextrose/ Fat Emulsion Intravenous 1,920 

ml @  80 mls/hr TPN  CONT IV  Last administered on 5/15/20at 22:48;  Start 

5/15/20 at 22:00;  Stop 5/16/20 at 21:59;  Status DC


Potassium Chloride 90 meq/ Magnesium Sulfate 20 meq/ Multivitamins 10 

ml/Chromium/ Copper/Manganese/ Seleni/Zn 1 ml/ Insulin Human Regular 15 unit/ 

Total Parenteral Nutrition/Amino Acids/Dextrose/ Fat Emulsion Intravenous 1,890 

ml @  78.75 mls/ hr TPN  CONT IV  Last administered on 5/16/20at 22:15;  Start 

5/16/20 at 22:00;  Stop 5/17/20 at 21:59;  Status DC


Linezolid/Dextrose 300 ml @  300 mls/hr Q12HR IV  Last administered on 5/19/20at

21:08;  Start 5/17/20 at 09:00;  Stop 5/20/20 at 08:11;  Status DC


Daptomycin 450 mg/ Sodium Chloride 50 ml @  100 mls/hr Q24H IV  Last 

administered on 5/20/20at 09:25;  Start 5/17/20 at 09:00;  Stop 5/21/20 at 

08:30;  Status DC


Potassium Chloride 90 meq/ Magnesium Sulfate 20 meq/ Multivitamins 10 

ml/Chromium/ Copper/Manganese/ Seleni/Zn 1 ml/ Insulin Human Regular 15 unit/ 

Total Parenteral Nutrition/Amino Acids/Dextrose/ Fat Emulsion Intravenous 1,890 

ml @  78.75 mls/ hr TPN  CONT IV  Last administered on 5/17/20at 21:34;  Start 

5/17/20 at 22:00;  Stop 5/18/20 at 21:59;  Status DC


Lorazepam (Ativan Inj) 2 mg STK-MED ONCE .ROUTE ;  Start 5/17/20 at 14:58;  Stop

5/17/20 at 14:58;  Status DC


Metoprolol Tartrate (Lopressor Vial) 5 mg 1X  ONCE IVP  Last administered on 

5/17/20at 15:31;  Start 5/17/20 at 15:15;  Stop 5/17/20 at 15:16;  Status DC


Lorazepam (Ativan Inj) 2 mg 1X  ONCE IVP  Last administered on 5/17/20at 15:30; 

Start 5/17/20 at 15:15;  Stop 5/17/20 at 15:16;  Status DC


Enoxaparin Sodium (Lovenox 40mg Syringe) 40 mg Q24H SQ  Last administered on 

5/25/20at 17:48;  Start 5/17/20 at 17:00


Lorazepam (Ativan Inj) 1 mg PRN Q4HRS  PRN IVP ANXIETY / AGITATION MILD-MOD Last

administered on 5/23/20at 04:21;  Start 5/17/20 at 19:15


Lorazepam (Ativan Inj) 2 mg PRN Q4HRS  PRN IVP ANXIETY / AGITATION SEVERE Last 

administered on 5/18/20at 22:20;  Start 5/17/20 at 19:15


Fentanyl Citrate (Fentanyl 2ml Vial) 50 mcg PRN Q4HRS  PRN IVP SEVERE PAIN Last 

administered on 5/26/20at 06:39;  Start 5/18/20 at 13:15


Fentanyl Citrate (Fentanyl 2ml Vial) 25 mcg PRN Q4HRS  PRN IVP MODERATE PAIN 

Last administered on 5/24/20at 06:36;  Start 5/18/20 at 13:15


Potassium Chloride 90 meq/ Magnesium Sulfate 20 meq/ Multivitamins 10 

ml/Chromium/ Copper/Manganese/ Seleni/Zn 1 ml/ Insulin Human Regular 15 unit/ 

Total Parenteral Nutrition/Amino Acids/Dextrose/ Fat Emulsion Intravenous 1,890 

ml @  78.75 mls/ hr TPN  CONT IV  Last administered on 5/18/20at 22:18;  Start 

5/18/20 at 22:00;  Stop 5/19/20 at 21:59;  Status DC


Furosemide (Lasix) 40 mg 1X  ONCE IVP  Last administered on 5/18/20at 21:51;  

Start 5/18/20 at 21:45;  Stop 5/18/20 at 21:48;  Status DC


Albumin Human 100 ml @  100 mls/hr 1X PRN  PRN IV SEE COMMENTS;  Start 5/19/20 

at 01:30


Furosemide (Lasix) 40 mg [id number] IVP  Last administered on 5/26/20at 08:01;  Start

5/19/20 at 14:00


Potassium Chloride 90 meq/ Magnesium Sulfate 20 meq/ Multivitamins 10 

ml/Chromium/ Copper/Manganese/ Seleni/Zn 1 ml/ Insulin Human Regular 15 unit/ 

Total Parenteral Nutrition/Amino Acids/Dextrose/ Fat Emulsion Intravenous 1,800 

ml @  75 mls/hr TPN  CONT IV  Last administered on 5/19/20at 22:31;  Start 

5/19/20 at 22:00;  Stop 5/20/20 at 21:59;  Status DC


Potassium Chloride 90 meq/ Magnesium Sulfate 20 meq/ Multivitamins 10 

ml/Chromium/ Copper/Manganese/ Seleni/Zn 1 ml/ Insulin Human Regular 15 unit/ 

Total Parenteral Nutrition/Amino Acids/Dextrose/ Fat Emulsion Intravenous 1,800 

ml @  75 mls/hr TPN  CONT IV  Last administered on 5/20/20at 22:28;  Start 

5/20/20 at 22:00;  Stop 5/21/20 at 21:59;  Status DC


Potassium Chloride 110 meq/ Magnesium Sulfate 20 meq/ Multivitamins 10 

ml/Chromium/ Copper/Manganese/ Seleni/Zn 1 ml/ Insulin Human Regular 15 unit/ 

Total Parenteral Nutrition/Amino Acids/Dextrose/ Fat Emulsion Intravenous 1,800 

ml @  75 mls/hr TPN  CONT IV  Last administered on 5/21/20at 22:01;  Start 

5/21/20 at 22:00;  Stop 5/22/20 at 21:59;  Status DC


Saliva Substitute (Biotene Moisturizing Mouth) 2 spray PRN Q15MIN  PRN PO DRY 

MOUTH;  Start 5/21/20 at 11:00


Potassium Chloride 110 meq/ Magnesium Sulfate 20 meq/ Multivitamins 10 

ml/Chromium/ Copper/Manganese/ Seleni/Zn 1 ml/ Insulin Human Regular 15 unit/ 

Total Parenteral Nutrition/Amino Acids/Dextrose/ Fat Emulsion Intravenous 1,800 

ml @  75 mls/hr TPN  CONT IV  Last administered on 5/22/20at 22:21;  Start 

5/22/20 at 22:00;  Stop 5/23/20 at 21:59;  Status DC


Potassium Chloride 110 meq/ Magnesium Sulfate 20 meq/ Multivitamins 10 

ml/Chromium/ Copper/Manganese/ Seleni/Zn 1 ml/ Insulin Human Regular 15 unit/ 

Total Parenteral Nutrition/Amino Acids/Dextrose/ Fat Emulsion Intravenous 1,800 

ml @  75 mls/hr TPN  CONT IV  Last administered on 5/23/20at 22:04;  Start 

5/23/20 at 22:00;  Stop 5/24/20 at 21:59;  Status DC


Potassium Chloride 110 meq/ Magnesium Sulfate 20 meq/ Multivitamins 10 

ml/Chromium/ Copper/Manganese/ Seleni/Zn 1 ml/ Insulin Human Regular 15 unit/ 

Total Parenteral Nutrition/Amino Acids/Dextrose/ Fat Emulsion Intravenous 1,800 

ml @  75 mls/hr TPN  CONT IV  Last administered on 5/24/20at 22:48;  Start 

5/24/20 at 22:00;  Stop 5/25/20 at 21:59;  Status DC


Potassium Chloride 70 meq/ Magnesium Sulfate 20 meq/ Multivitamins 10 

ml/Chromium/ Copper/Manganese/ Seleni/Zn 1 ml/ Insulin Human Regular 15 unit/ 

Total Parenteral Nutrition/Amino Acids/Dextrose/ Fat Emulsion Intravenous 1,800 

ml @  75 mls/hr TPN  CONT IV  Last administered on 5/25/20at 21:39;  Start 

5/25/20 at 22:00;  Stop 5/26/20 at 21:59


Meropenem 500 mg/ Sodium Chloride 50 ml @  100 mls/hr Q6HRS IV  Last 

administered on 5/26/20at 06:01;  Start 5/25/20 at 18:00





Active Scripts


Active


Reported


Bisoprolol Fumarate 5 Mg Tablet 10 Mg PO DAILY


Vitals/I & O





Vital Sign - Last 24 Hours








 5/25/20 5/25/20 5/25/20 5/25/20





 08:57 09:45 10:00 11:00


 


Pulse 101  120 136


 


Resp 18 30 32 40


 


B/P (MAP) 109/64 (79)  144/82 (102) 141/80 (100)


 


Pulse Ox 99 99 99 100


 


O2 Delivery Tracheal Collar Tracheal Collar Tracheal Collar Tracheal Collar


 


O2 Flow Rate 8.0 8.0 8.0 8.0





 5/25/20 5/25/20 5/25/20 5/25/20





 11:09 11:56 12:00 13:00


 


Temp   100.4 





   100.4 


 


Pulse   128 118


 


Resp 40  32 28


 


B/P (MAP)   117/69 (85) 130/66 (87)


 


Pulse Ox 98  100 100


 


O2 Delivery Tracheal Collar Trach Collar Tracheal Collar Tracheal Collar


 


O2 Flow Rate 8.0 8.0 8.0 8.0


 


    





    





 5/25/20 5/25/20 5/25/20 5/25/20





 13:49 14:00 14:30 15:00


 


Pulse  120  116


 


Resp 29 21 30 32


 


B/P (MAP)  127/84 (98)  103/66 (78)


 


Pulse Ox 100 99 99 99


 


O2 Delivery Tracheal Collar Tracheal Collar Tracheal Collar Tracheal Collar


 


O2 Flow Rate 8.0 8.0 8.0 8.0





 5/25/20 5/25/20 5/25/20 5/25/20





 15:32 16:00 17:00 17:50


 


Temp  98.6  





  98.6  


 


Pulse  122 117 


 


Resp  24 30 28


 


B/P (MAP)  133/80 (97) 122/70 (87) 


 


Pulse Ox  99 99 99


 


O2 Delivery Trach Collar Tracheal Collar Tracheal Collar Tracheal Collar


 


O2 Flow Rate 8.0 8.0 8.0 8.0


 


    





    





 5/25/20 5/25/20 5/25/20 5/25/20





 18:00 18:40 19:00 20:00


 


Temp    98.8





    98.8


 


Pulse 115  114 114


 


Resp 28 28 22 27


 


B/P (MAP) 126/77 (93)  126/70 (88) 132/82 (99)


 


Pulse Ox 99 99 99 96


 


O2 Delivery Tracheal Collar Tracheal Collar Tracheal Collar Tracheal Collar


 


O2 Flow Rate 8.0 8.0 8.0 8.0


 


    





    





 5/25/20 5/25/20 5/25/20 5/25/20





 20:00 21:00 22:00 22:21


 


Pulse  112 92 


 


Resp  22 19 27


 


B/P (MAP)  109/58 (75) 119/60 (79) 


 


Pulse Ox  100 100 96


 


O2 Delivery Trach Collar Tracheal Collar Tracheal Collar Tracheal Collar


 


O2 Flow Rate 8.0 8.0 8.0 8.0





 5/25/20 5/26/20 5/26/20 5/26/20





 23:00 00:00 00:00 00:05


 


Temp   99.4 





   99.4 


 


Pulse 101  107 


 


Resp 23  22 


 


B/P (MAP) 94/54 (67)  102/61 (75) 


 


Pulse Ox 100  96 98


 


O2 Delivery Tracheal Collar Trach Collar Tracheal Collar Tracheal Collar


 


O2 Flow Rate 8.0 8.0 8.0 


 


    





    





 5/26/20 5/26/20 5/26/20 5/26/20





 01:00 02:00 02:34 03:00


 


Pulse 106 109  108


 


Resp 22 21 18 21


 


B/P (MAP) 98/55 (69) 110/60 (77)  107/62 (77)


 


Pulse Ox 99 99 97 100


 


O2 Delivery Tracheal Collar Tracheal Collar Tracheal Collar Tracheal Collar


 


O2 Flow Rate 8.0 8.0 8.0 8.0





 5/26/20 5/26/20 5/26/20 5/26/20





 03:45 04:00 05:00 06:00


 


Temp  98.2  





  98.2  


 


Pulse  106 112 104


 


Resp  20 27 25


 


B/P (MAP)  112/71 (85) 112/62 (79) 93/54 (67)


 


Pulse Ox  99 99 99


 


O2 Delivery Trach Collar Tracheal Collar Tracheal Collar Tracheal Collar


 


O2 Flow Rate 8.0 8.0 8.0 8.0


 


    





    





 5/26/20   





 07:00   


 


Pulse 99   


 


Resp 15   


 


B/P (MAP) 109/58 (75)   


 


Pulse Ox 99   


 


O2 Delivery Tracheal Collar   


 


O2 Flow Rate 8.0   














Intake and Output   


 


 5/25/20 5/25/20 5/26/20





 15:00 23:00 07:00


 


Intake Total 0 ml 957.66 ml 1089 ml


 


Output Total 1690 ml 415 ml 380 ml


 


Balance -1690 ml 542.66 ml 709 ml

















CLEMENTINA PANTOJA MD           May 26, 2020 08:54

## 2020-05-26 NOTE — PDOC
G I PROGRESS NOTE


Subjective


Dislikes P-M valve.  Would really like something orally.


Objective


NG still with bilious drainage.


Physical Exam


Lungs with coarse sounds anteriorly.


RRR, tachy.


Abdomen soft, not many bowel sounds.


Review of Relevant


I have reviewed the following items josy (where applicable) has been applied.


Labs





Laboratory Tests








Test


 5/24/20


11:25 5/24/20


23:39 5/25/20


06:50 5/25/20


06:52


 


White Blood Count


 16.0 x10^3/uL


(4.0-11.0) 


 


 





 


Red Blood Count


 2.94 x10^6/uL


(3.50-5.40) 


 


 





 


Hemoglobin


 8.5 g/dL


(12.0-15.5) 


 


 





 


Hematocrit


 25.5 %


(36.0-47.0) 


 


 





 


Mean Corpuscular Volume 87 fL ()    


 


Mean Corpuscular Hemoglobin 29 pg (25-35)    


 


Mean Corpuscular Hemoglobin


Concent 34 g/dL


(31-37) 


 


 





 


Red Cell Distribution Width


 19.0 %


(11.5-14.5) 


 


 





 


Platelet Count


 317 x10^3/uL


(140-400) 


 


 





 


Sodium Level


 136 mmol/L


(136-145) 


 135 mmol/L


(136-145) 





 


Potassium Level


 4.8 mmol/L


(3.5-5.1) 


 5.0 mmol/L


(3.5-5.1) 





 


Chloride Level


 98 mmol/L


() 


 97 mmol/L


() 





 


Carbon Dioxide Level


 34 mmol/L


(21-32) 


 34 mmol/L


(21-32) 





 


Anion Gap 4 (6-14)   4 (6-14)  


 


Blood Urea Nitrogen


 23 mg/dL


(7-20) 


 24 mg/dL


(7-20) 





 


Creatinine


 0.8 mg/dL


(0.6-1.0) 


 0.8 mg/dL


(0.6-1.0) 





 


Estimated GFR


(Cockcroft-Gault) 76.2 


 


 76.2 


 





 


BUN/Creatinine Ratio 29 (6-20)   30 (6-20)  


 


Glucose Level


 131 mg/dL


(70-99) 


 140 mg/dL


(70-99) 





 


Calcium Level


 10.1 mg/dL


(8.5-10.1) 


 10.0 mg/dL


(8.5-10.1) 





 


Total Bilirubin


 0.7 mg/dL


(0.2-1.0) 


 0.7 mg/dL


(0.2-1.0) 





 


Aspartate Amino Transf


(AST/SGOT) 34 U/L (15-37) 


 


 26 U/L (15-37) 


 





 


Alanine Aminotransferase


(ALT/SGPT) 25 U/L (14-59) 


 


 25 U/L (14-59) 


 





 


Alkaline Phosphatase


 131 U/L


() 


 119 U/L


() 





 


Total Protein


 6.9 g/dL


(6.4-8.2) 


 6.9 g/dL


(6.4-8.2) 





 


Albumin


 2.4 g/dL


(3.4-5.0) 


 2.3 g/dL


(3.4-5.0) 





 


Albumin/Globulin Ratio 0.5 (1.0-1.7)   0.5 (1.0-1.7)  


 


Glucose (Fingerstick)


 


 161 mg/dL


(70-99) 


 135 mg/dL


(70-99)


 


Phosphorus Level


 


 


 5.2 mg/dL


(2.6-4.7) 





 


Magnesium Level


 


 


 2.3 mg/dL


(1.8-2.4) 





 


Triglycerides Level


 


 


 217 mg/dL


(0-150) 





 


Test


 5/25/20


17:46 5/25/20


23:55 5/26/20


05:45 5/26/20


06:00


 


Glucose (Fingerstick)


 173 mg/dL


(70-99) 166 mg/dL


(70-99) 


 170 mg/dL


(70-99)


 


White Blood Count


 


 


 11.4 x10^3/uL


(4.0-11.0) 





 


Red Blood Count


 


 


 2.80 x10^6/uL


(3.50-5.40) 





 


Hemoglobin


 


 


 8.0 g/dL


(12.0-15.5) 





 


Hematocrit


 


 


 24.4 %


(36.0-47.0) 





 


Mean Corpuscular Volume   87 fL ()  


 


Mean Corpuscular Hemoglobin   28 pg (25-35)  


 


Mean Corpuscular Hemoglobin


Concent 


 


 33 g/dL


(31-37) 





 


Red Cell Distribution Width


 


 


 19.5 %


(11.5-14.5) 





 


Platelet Count


 


 


 325 x10^3/uL


(140-400) 





 


Neutrophils (%) (Auto)   72 % (31-73)  


 


Lymphocytes (%) (Auto)   17 % (24-48)  


 


Monocytes (%) (Auto)   9 % (0-9)  


 


Eosinophils (%) (Auto)   2 % (0-3)  


 


Basophils (%) (Auto)   0 % (0-3)  


 


Neutrophils # (Auto)


 


 


 8.2 x10^3/uL


(1.8-7.7) 





 


Lymphocytes # (Auto)


 


 


 2.0 x10^3/uL


(1.0-4.8) 





 


Monocytes # (Auto)


 


 


 1.0 x10^3/uL


(0.0-1.1) 





 


Eosinophils # (Auto)


 


 


 0.2 x10^3/uL


(0.0-0.7) 





 


Basophils # (Auto)


 


 


 0.0 x10^3/uL


(0.0-0.2) 





 


Sodium Level


 


 


 134 mmol/L


(136-145) 





 


Potassium Level


 


 


 4.6 mmol/L


(3.5-5.1) 





 


Chloride Level


 


 


 97 mmol/L


() 





 


Carbon Dioxide Level


 


 


 33 mmol/L


(21-32) 





 


Anion Gap   4 (6-14)  


 


Blood Urea Nitrogen


 


 


 28 mg/dL


(7-20) 





 


Creatinine


 


 


 0.9 mg/dL


(0.6-1.0) 





 


Estimated GFR


(Cockcroft-Gault) 


 


 66.5 


 





 


Glucose Level


 


 


 183 mg/dL


(70-99) 





 


Calcium Level


 


 


 10.0 mg/dL


(8.5-10.1) 











Laboratory Tests








Test


 5/25/20


17:46 5/25/20


23:55 5/26/20


05:45 5/26/20


06:00


 


Glucose (Fingerstick)


 173 mg/dL


(70-99) 166 mg/dL


(70-99) 


 170 mg/dL


(70-99)


 


White Blood Count


 


 


 11.4 x10^3/uL


(4.0-11.0) 





 


Red Blood Count


 


 


 2.80 x10^6/uL


(3.50-5.40) 





 


Hemoglobin


 


 


 8.0 g/dL


(12.0-15.5) 





 


Hematocrit


 


 


 24.4 %


(36.0-47.0) 





 


Mean Corpuscular Volume   87 fL ()  


 


Mean Corpuscular Hemoglobin   28 pg (25-35)  


 


Mean Corpuscular Hemoglobin


Concent 


 


 33 g/dL


(31-37) 





 


Red Cell Distribution Width


 


 


 19.5 %


(11.5-14.5) 





 


Platelet Count


 


 


 325 x10^3/uL


(140-400) 





 


Neutrophils (%) (Auto)   72 % (31-73)  


 


Lymphocytes (%) (Auto)   17 % (24-48)  


 


Monocytes (%) (Auto)   9 % (0-9)  


 


Eosinophils (%) (Auto)   2 % (0-3)  


 


Basophils (%) (Auto)   0 % (0-3)  


 


Neutrophils # (Auto)


 


 


 8.2 x10^3/uL


(1.8-7.7) 





 


Lymphocytes # (Auto)


 


 


 2.0 x10^3/uL


(1.0-4.8) 





 


Monocytes # (Auto)


 


 


 1.0 x10^3/uL


(0.0-1.1) 





 


Eosinophils # (Auto)


 


 


 0.2 x10^3/uL


(0.0-0.7) 





 


Basophils # (Auto)


 


 


 0.0 x10^3/uL


(0.0-0.2) 





 


Sodium Level


 


 


 134 mmol/L


(136-145) 





 


Potassium Level


 


 


 4.6 mmol/L


(3.5-5.1) 





 


Chloride Level


 


 


 97 mmol/L


() 





 


Carbon Dioxide Level


 


 


 33 mmol/L


(21-32) 





 


Anion Gap   4 (6-14)  


 


Blood Urea Nitrogen


 


 


 28 mg/dL


(7-20) 





 


Creatinine


 


 


 0.9 mg/dL


(0.6-1.0) 





 


Estimated GFR


(Cockcroft-Gault) 


 


 66.5 


 





 


Glucose Level


 


 


 183 mg/dL


(70-99) 





 


Calcium Level


 


 


 10.0 mg/dL


(8.5-10.1) 











Microbiology


5/17/20 Blood Culture - Final, Complete


          NO GROWTH AFTER 5 DAYS


5/6/20 Fungal Culture - Final, Complete


         


5/6/20 Fungal Culture Result 1 - Final, Complete


         


4/30/20 Aerobic Culture - Final, Complete


          


4/30/20 Aerobic Culture Result 1 ([facility]) - Final, Complete


          


4/30/20 Gram Stain - Final, Complete


          


4/30/20 Gram Stain Result 1 ([facility]) - Final, Complete


          


4/30/20 Gram Stain Result 2 ([facility]) - Final, Complete


          


4/12/20 Urine Culture - Final, Complete


          


4/12/20 Urine Culture Result 1 ([facility]) - Final, Complete


Vitals/I & O





Vital Sign - Last 24 Hours








 5/25/20 5/25/20 5/25/20 5/25/20





 11:56 12:00 13:00 13:49


 


Temp  100.4  





  100.4  


 


Pulse  128 118 


 


Resp  32 28 29


 


B/P (MAP)  117/69 (85) 130/66 (87) 


 


Pulse Ox  100 100 100


 


O2 Delivery Trach Collar Tracheal Collar Tracheal Collar Tracheal Collar


 


O2 Flow Rate 8.0 8.0 8.0 8.0


 


    





    





 5/25/20 5/25/20 5/25/20 5/25/20





 14:00 14:30 15:00 15:32


 


Pulse 120  116 


 


Resp 21 30 32 


 


B/P (MAP) 127/84 (98)  103/66 (78) 


 


Pulse Ox 99 99 99 


 


O2 Delivery Tracheal Collar Tracheal Collar Tracheal Collar Trach Collar


 


O2 Flow Rate 8.0 8.0 8.0 8.0





 5/25/20 5/25/20 5/25/20 5/25/20





 16:00 17:00 17:50 18:00


 


Temp 98.6   





 98.6   


 


Pulse 122 117  115


 


Resp 24 30 28 28


 


B/P (MAP) 133/80 (97) 122/70 (87)  126/77 (93)


 


Pulse Ox 99 99 99 99


 


O2 Delivery Tracheal Collar Tracheal Collar Tracheal Collar Tracheal Collar


 


O2 Flow Rate 8.0 8.0 8.0 8.0


 


    





    





 5/25/20 5/25/20 5/25/20 5/25/20





 18:40 19:00 20:00 20:00


 


Temp   98.8 





   98.8 


 


Pulse  114 114 


 


Resp 28 22 27 


 


B/P (MAP)  126/70 (88) 132/82 (99) 


 


Pulse Ox 99 99 96 


 


O2 Delivery Tracheal Collar Tracheal Collar Tracheal Collar Trach Collar


 


O2 Flow Rate 8.0 8.0 8.0 8.0


 


    





    





 5/25/20 5/25/20 5/25/20 5/25/20





 21:00 22:00 22:21 23:00


 


Pulse 112 92  101


 


Resp 22 19 27 23


 


B/P (MAP) 109/58 (75) 119/60 (79)  94/54 (67)


 


Pulse Ox 100 100 96 100


 


O2 Delivery Tracheal Collar Tracheal Collar Tracheal Collar Tracheal Collar


 


O2 Flow Rate 8.0 8.0 8.0 8.0





 5/26/20 5/26/20 5/26/20 5/26/20





 00:00 00:00 00:05 01:00


 


Temp  99.4  





  99.4  


 


Pulse  107  106


 


Resp  22  22


 


B/P (MAP)  102/61 (75)  98/55 (69)


 


Pulse Ox  96 98 99


 


O2 Delivery Trach Collar Tracheal Collar Tracheal Collar Tracheal Collar


 


O2 Flow Rate 8.0 8.0  8.0


 


    





    





 5/26/20 5/26/20 5/26/20 5/26/20





 02:00 02:34 03:00 03:45


 


Pulse 109  108 


 


Resp 21 18 21 


 


B/P (MAP) 110/60 (77)  107/62 (77) 


 


Pulse Ox 99 97 100 


 


O2 Delivery Tracheal Collar Tracheal Collar Tracheal Collar Trach Collar


 


O2 Flow Rate 8.0 8.0 8.0 8.0





 5/26/20 5/26/20 5/26/20 5/26/20





 04:00 05:00 06:00 07:00


 


Temp 98.2   





 98.2   


 


Pulse 106 112 104 99


 


Resp 20 27 25 15


 


B/P (MAP) 112/71 (85) 112/62 (79) 93/54 (67) 109/58 (75)


 


Pulse Ox 99 99 99 99


 


O2 Delivery Tracheal Collar Tracheal Collar Tracheal Collar Tracheal Collar


 


O2 Flow Rate 8.0 8.0 8.0 8.0


 


    





    





 5/26/20 5/26/20 5/26/20 5/26/20





 08:00 08:00 09:00 10:00


 


Temp  98.5  





  98.5  


 


Pulse  99 115 125


 


Resp  15 20 24


 


B/P (MAP)  132/62 (85) 119/60 (79) 135/68 (90)


 


Pulse Ox  99 99 99


 


O2 Delivery Trach Collar Tracheal Collar Tracheal Collar Tracheal Collar


 


O2 Flow Rate 8.0 8.0 8.0 8.0


 


    





    





 5/26/20 5/26/20  





 10:51 11:00  


 


Pulse  134  


 


Resp 40 26  


 


B/P (MAP)  100/69 (79)  


 


Pulse Ox 99 99  


 


O2 Delivery  Tracheal Collar  


 


O2 Flow Rate 8.0 8.0  














Intake and Output   


 


 5/25/20 5/25/20 5/26/20





 15:00 23:00 07:00


 


Intake Total 0 ml 957.66 ml 1089 ml


 


Output Total 1690 ml 415 ml 380 ml


 


Balance -1690 ml 542.66 ml 709 ml








Problem List


Problems


Medical Problems:


(1) Acute pancreatitis


Status: Acute  





(2) Cholelithiasis


Status: Acute  





Assessment


Still needing NG or vomits.


Overall, slowly improving.


Plan of Care Note


Continue as now.


If ok with SLP, will allow ice chips.





Hemodynamically unstable?:  No


Is patient in severe pain?:  No


Is NPO status required?:  Yes











MARCO ANTONIO LONGO MD          May 26, 2020 11:19

## 2020-05-26 NOTE — NUR
Pharmacy TPN Dosing Note



S: JESENIA BEAN is a 49 year old F Currently receiving Central Continuous TPN started 
03/18/20



B:Pertinent PMH: 

Necrotizing pancreatitis

Height: 5 feet, 8 inches

Weight: 75.066225 kg



Current diet: NPO 



LABS:

Sodium:    134 

Potassium: 4.6 

Chloride:  97 

Calcium:   10.0 

Corrected Calcium: 11.36 

Magnesium: 2.3 

CO2:       33 

SCr:       0.9 

Glucose:   166-183 

Albumin:   2.3 

AST:       26 

ALT:       25 



TPN FORMULA:

TPN TYPE:  Central Continuous

AMINO ACIDS:         75 gm

DEXTROSE:            250 gm

LIPIDS:              40 gm

SODIUM CHLORIDE:     - mEq

SODIUM ACETATE:      - mEq

SODIUM PHOSPHATE:    - mmol

POTASSIUM CHLORIDE:  70 mEq

POTASSIUM ACETATE:   - mEq

POTASSIUM PHOSPHATE: - mmol

MAGNESIUM:           20 mEq

CALCIUM:             - mEq

INSULIN:             15 units

MULTIPLE VITAMIN:    10 ml

TRACE ELEMENTS:      1 ml(s)



TPN PLAN:  

Macro increases per dietary. Labs stable, next BMP, trig Thursday.





R: Continue TPN as above.

Will monitor electrolytes, glucose, and tolerance to TPN.



 CANDACE BELTRE [facility], 05/26/20 1439

## 2020-05-26 NOTE — RAD
Examination: VIDEO SWALLOW STUDY

 

History: Aspiration

 

Comparison/Correlation: None

 

Findings: Video swallow was performed utilizing fluoroscopy for 3.9 

minutes. Thin liquid, thick liquid, puree and cracker coated forms of 

barium were utilized.

 

Oral motility and bolus formation are adequate. Moderate vallecular 

residuals are present. No gross aspiration is seen with thin liquid barium

and to a lesser extent with nectar thick liquid barium. No aspiration with

Honey thick liquid barium.

 

With a passing air valve in place, deep penetration is noted with thin 

liquid barium. Vallecular residuals are smaller in quantity as compared to

earlier when thick liquid and other substances were provided..

 

Impression:

Aspiration with thin liquid and nectar thick liquid barium. Moderate size 

lacunar residuals which are less when passing air valve was in place.

 

Electronically signed by: Fredis Buchanan MD (5/26/2020 4:08 PM) [id number]

## 2020-05-27 VITALS — DIASTOLIC BLOOD PRESSURE: 73 MMHG | SYSTOLIC BLOOD PRESSURE: 140 MMHG

## 2020-05-27 VITALS — SYSTOLIC BLOOD PRESSURE: 129 MMHG | DIASTOLIC BLOOD PRESSURE: 67 MMHG

## 2020-05-27 VITALS — SYSTOLIC BLOOD PRESSURE: 118 MMHG | DIASTOLIC BLOOD PRESSURE: 61 MMHG

## 2020-05-27 VITALS — DIASTOLIC BLOOD PRESSURE: 70 MMHG | SYSTOLIC BLOOD PRESSURE: 122 MMHG

## 2020-05-27 VITALS — SYSTOLIC BLOOD PRESSURE: 117 MMHG | DIASTOLIC BLOOD PRESSURE: 61 MMHG

## 2020-05-27 VITALS — SYSTOLIC BLOOD PRESSURE: 117 MMHG | DIASTOLIC BLOOD PRESSURE: 67 MMHG

## 2020-05-27 VITALS — DIASTOLIC BLOOD PRESSURE: 70 MMHG | SYSTOLIC BLOOD PRESSURE: 124 MMHG

## 2020-05-27 VITALS — SYSTOLIC BLOOD PRESSURE: 117 MMHG | DIASTOLIC BLOOD PRESSURE: 65 MMHG

## 2020-05-27 VITALS — SYSTOLIC BLOOD PRESSURE: 118 MMHG | DIASTOLIC BLOOD PRESSURE: 66 MMHG

## 2020-05-27 VITALS — DIASTOLIC BLOOD PRESSURE: 59 MMHG | SYSTOLIC BLOOD PRESSURE: 97 MMHG

## 2020-05-27 VITALS — DIASTOLIC BLOOD PRESSURE: 67 MMHG | SYSTOLIC BLOOD PRESSURE: 132 MMHG

## 2020-05-27 VITALS — DIASTOLIC BLOOD PRESSURE: 68 MMHG | SYSTOLIC BLOOD PRESSURE: 134 MMHG

## 2020-05-27 VITALS — SYSTOLIC BLOOD PRESSURE: 118 MMHG | DIASTOLIC BLOOD PRESSURE: 64 MMHG

## 2020-05-27 VITALS — DIASTOLIC BLOOD PRESSURE: 64 MMHG | SYSTOLIC BLOOD PRESSURE: 106 MMHG

## 2020-05-27 VITALS — SYSTOLIC BLOOD PRESSURE: 122 MMHG | DIASTOLIC BLOOD PRESSURE: 72 MMHG

## 2020-05-27 VITALS — SYSTOLIC BLOOD PRESSURE: 127 MMHG | DIASTOLIC BLOOD PRESSURE: 80 MMHG

## 2020-05-27 VITALS — SYSTOLIC BLOOD PRESSURE: 115 MMHG | DIASTOLIC BLOOD PRESSURE: 63 MMHG

## 2020-05-27 VITALS — DIASTOLIC BLOOD PRESSURE: 69 MMHG | SYSTOLIC BLOOD PRESSURE: 130 MMHG

## 2020-05-27 VITALS — DIASTOLIC BLOOD PRESSURE: 48 MMHG | SYSTOLIC BLOOD PRESSURE: 97 MMHG

## 2020-05-27 VITALS — SYSTOLIC BLOOD PRESSURE: 106 MMHG | DIASTOLIC BLOOD PRESSURE: 56 MMHG

## 2020-05-27 VITALS — SYSTOLIC BLOOD PRESSURE: 130 MMHG | DIASTOLIC BLOOD PRESSURE: 70 MMHG

## 2020-05-27 VITALS — DIASTOLIC BLOOD PRESSURE: 93 MMHG | SYSTOLIC BLOOD PRESSURE: 164 MMHG

## 2020-05-27 VITALS — SYSTOLIC BLOOD PRESSURE: 107 MMHG | DIASTOLIC BLOOD PRESSURE: 70 MMHG

## 2020-05-27 VITALS — SYSTOLIC BLOOD PRESSURE: 127 MMHG | DIASTOLIC BLOOD PRESSURE: 60 MMHG

## 2020-05-27 RX ADMIN — ENOXAPARIN SODIUM SCH MG: 40 INJECTION SUBCUTANEOUS at 17:21

## 2020-05-27 RX ADMIN — INSULIN LISPRO SCH UNITS: 100 INJECTION, SOLUTION INTRAVENOUS; SUBCUTANEOUS at 06:00

## 2020-05-27 RX ADMIN — FENTANYL CITRATE PRN MCG: 50 INJECTION INTRAMUSCULAR; INTRAVENOUS at 17:20

## 2020-05-27 RX ADMIN — MEROPENEM SCH MLS/HR: 500 INJECTION, POWDER, FOR SOLUTION INTRAVENOUS at 06:02

## 2020-05-27 RX ADMIN — PIPERACILLIN SODIUM AND TAZOBACTAM SODIUM SCH MLS/HR: 3; .375 INJECTION, POWDER, LYOPHILIZED, FOR SOLUTION INTRAVENOUS at 18:03

## 2020-05-27 RX ADMIN — ONDANSETRON PRN MG: 2 INJECTION INTRAMUSCULAR; INTRAVENOUS at 13:14

## 2020-05-27 RX ADMIN — PIPERACILLIN SODIUM AND TAZOBACTAM SODIUM SCH MLS/HR: 3; .375 INJECTION, POWDER, LYOPHILIZED, FOR SOLUTION INTRAVENOUS at 13:22

## 2020-05-27 RX ADMIN — FENTANYL CITRATE PRN MCG: 50 INJECTION INTRAMUSCULAR; INTRAVENOUS at 06:11

## 2020-05-27 RX ADMIN — DEXMEDETOMIDINE HYDROCHLORIDE PRN MLS/HR: 100 INJECTION, SOLUTION, CONCENTRATE INTRAVENOUS at 13:53

## 2020-05-27 RX ADMIN — PROCHLORPERAZINE EDISYLATE PRN MG: 5 INJECTION INTRAMUSCULAR; INTRAVENOUS at 16:28

## 2020-05-27 RX ADMIN — INSULIN LISPRO SCH UNITS: 100 INJECTION, SOLUTION INTRAVENOUS; SUBCUTANEOUS at 18:00

## 2020-05-27 RX ADMIN — ONDANSETRON PRN MG: 2 INJECTION INTRAMUSCULAR; INTRAVENOUS at 21:19

## 2020-05-27 RX ADMIN — BACITRACIN SCH MLS/HR: 5000 INJECTION, POWDER, FOR SOLUTION INTRAMUSCULAR at 21:25

## 2020-05-27 RX ADMIN — FUROSEMIDE SCH MG: 10 INJECTION, SOLUTION INTRAMUSCULAR; INTRAVENOUS at 14:45

## 2020-05-27 RX ADMIN — FUROSEMIDE SCH MG: 10 INJECTION, SOLUTION INTRAMUSCULAR; INTRAVENOUS at 08:16

## 2020-05-27 RX ADMIN — PANTOPRAZOLE SODIUM SCH MG: 40 INJECTION, POWDER, FOR SOLUTION INTRAVENOUS at 08:16

## 2020-05-27 RX ADMIN — Medication PRN EACH: at 12:36

## 2020-05-27 RX ADMIN — FENTANYL CITRATE PRN MCG: 50 INJECTION INTRAMUSCULAR; INTRAVENOUS at 13:22

## 2020-05-27 RX ADMIN — FENTANYL CITRATE PRN MCG: 50 INJECTION INTRAMUSCULAR; INTRAVENOUS at 21:19

## 2020-05-27 RX ADMIN — INSULIN LISPRO SCH UNITS: 100 INJECTION, SOLUTION INTRAVENOUS; SUBCUTANEOUS at 13:15

## 2020-05-27 NOTE — NUR
SS following up with discharge planning. SS reviewed pt chart and discussed with RN. Pt now 
on room air and approved for honey thick liquids. Pt improving with PT/OT. Pt remains on TPN 
at this time and has ROBERT drains x3. Pt currently on IV Zosyn. SS left voicemail for pt's 
daughter, Glenna, [phone], to discuss discharge planning. SS will continue to follow 
for discharge planning.

## 2020-05-27 NOTE — NUR
Pharmacy TPN Dosing Note



S: JESENIA BEAN is a 49 year old F Currently receiving Central Continuous TPN started 
03/18/20



B:Pertinent PMH: Necrotizing pancreatitis

Height: 5 feet, 8 inches

Weight: 75.7 kg



Current diet: NPO 



LABS:

Sodium:    134 

Potassium: 4.6 

Chloride:  97 

Calcium:   10.0 

Corrected Calcium: 11.36 

Magnesium: 2.3 

CO2:       33 

SCr:       0.9 

Glucose:   142 

Albumin:   2.3 

AST:       26 

ALT:       25 



TPN FORMULA:

TPN TYPE:  Central Continuous

AMINO ACIDS:         75 gm

DEXTROSE:            250 gm

LIPIDS:              40 gm

POTASSIUM CHLORIDE:  70 mEq

MAGNESIUM:           20 mEq

INSULIN:             15 units

MULTIPLE VITAMIN:    10 ml

TRACE ELEMENTS:      1 ml(s)



TPN PLAN:  

No labs today. Refill TPN

-BMP and triglycerides in AM.





R: Continue same TPN formula.

Will monitor electrolytes, glucose, and tolerance to TPN.



 LORENZO LESLIE RPH, 05/27/20 [id number]

## 2020-05-27 NOTE — PDOC
PULMONARY PROGRESS NOTES


Subjective


Patient intubated on 3/23 , s/p trach 4/6,


Remains on TS 


Denies SOB or increased cough,


Vitals





Vital Signs








  Date Time  Temp Pulse Resp B/P (MAP) Pulse Ox O2 Delivery O2 Flow Rate FiO2


 


5/27/20 09:00  136 26 122/70 (87) 96 Room Air  


 


5/27/20 08:12 98.7       





 98.7       


 


5/27/20 04:00        








ROS:  No Chest Pain, No Abdominal Pain, No Increase Cough


General:  Alert, No acute distress


HEENT:  Other (trach midline )


Lungs:  Other (Good air movement but having a lot of productive sputum from her 

tracheostomy site)


Cardiovascular:  S1, S2


Abdomen:  Soft, Non-tender


Neuro Exam:  Alert


Extremities:  Other (+3 generalized edema )


Skin:  Warm, Dry


Labs





Laboratory Tests








Test


 5/25/20


17:46 5/25/20


23:55 5/26/20


05:45 5/26/20


06:00


 


Glucose (Fingerstick)


 173 mg/dL


(70-99) 166 mg/dL


(70-99) 


 170 mg/dL


(70-99)


 


White Blood Count


 


 


 11.4 x10^3/uL


(4.0-11.0) 





 


Red Blood Count


 


 


 2.80 x10^6/uL


(3.50-5.40) 





 


Hemoglobin


 


 


 8.0 g/dL


(12.0-15.5) 





 


Hematocrit


 


 


 24.4 %


(36.0-47.0) 





 


Mean Corpuscular Volume   87 fL ()  


 


Mean Corpuscular Hemoglobin   28 pg (25-35)  


 


Mean Corpuscular Hemoglobin


Concent 


 


 33 g/dL


(31-37) 





 


Red Cell Distribution Width


 


 


 19.5 %


(11.5-14.5) 





 


Platelet Count


 


 


 325 x10^3/uL


(140-400) 





 


Neutrophils (%) (Auto)   72 % (31-73)  


 


Lymphocytes (%) (Auto)   17 % (24-48)  


 


Monocytes (%) (Auto)   9 % (0-9)  


 


Eosinophils (%) (Auto)   2 % (0-3)  


 


Basophils (%) (Auto)   0 % (0-3)  


 


Neutrophils # (Auto)


 


 


 8.2 x10^3/uL


(1.8-7.7) 





 


Lymphocytes # (Auto)


 


 


 2.0 x10^3/uL


(1.0-4.8) 





 


Monocytes # (Auto)


 


 


 1.0 x10^3/uL


(0.0-1.1) 





 


Eosinophils # (Auto)


 


 


 0.2 x10^3/uL


(0.0-0.7) 





 


Basophils # (Auto)


 


 


 0.0 x10^3/uL


(0.0-0.2) 





 


Sodium Level


 


 


 134 mmol/L


(136-145) 





 


Potassium Level


 


 


 4.6 mmol/L


(3.5-5.1) 





 


Chloride Level


 


 


 97 mmol/L


() 





 


Carbon Dioxide Level


 


 


 33 mmol/L


(21-32) 





 


Anion Gap   4 (6-14)  


 


Blood Urea Nitrogen


 


 


 28 mg/dL


(7-20) 





 


Creatinine


 


 


 0.9 mg/dL


(0.6-1.0) 





 


Estimated GFR


(Cockcroft-Gault) 


 


 66.5 


 





 


Glucose Level


 


 


 183 mg/dL


(70-99) 





 


Calcium Level


 


 


 10.0 mg/dL


(8.5-10.1) 





 


Test


 5/26/20


17:52 5/26/20


23:28 5/27/20


06:07 





 


Glucose (Fingerstick)


 182 mg/dL


(70-99) 161 mg/dL


(70-99) 142 mg/dL


(70-99) 











Laboratory Tests








Test


 5/26/20


17:52 5/26/20


23:28 5/27/20


06:07


 


Glucose (Fingerstick)


 182 mg/dL


(70-99) 161 mg/dL


(70-99) 142 mg/dL


(70-99)








Medications





Active Scripts








 Medications  Dose


 Route/Sig


 Max Daily Dose Days Date Category


 


 Bisoprolol


 Fumarate 5 Mg


 Tablet  10 Mg


 PO DAILY


   3/16/20 Reported











Impression


.


IMPRESSION:


1.  Acute hypoxemic respiratory failure secondary to ARDS status post trach,


2.  Gallstone pancreatitis


3.  Severe metabolic acidosis.stable


4.  Acute kidney injury-stable, Off HD-- continue to improve 


5.  Acute gallstone pancreatitis.


6.  Hypoalbuminemia.


7.  Moderate persistent effusions, s/p left thora  5/12


8.  Fever-  Per ID, per surgery--resolved 


9.  Chronic anemia


10. Covid 19 testing negative


11. Moderate to large ascites-S/P paracentisis


12.S/P paracentisis with 4 liters removed on 4/15/20


13. S/P IR drain placement on 5/8/2020





Plan


.


1.Continue supplemental oxygen via trach shield--  PMV/capping as tolerated


2. s/p  thoracentesis, 5/12, 3 litres removed


3. Follow surgery recs-- S/P 3 drain placed in IR on 5/8/2020


4. Follow ID recs for ABX


5. Follow nephrology recs 


6. Continue TPN 


DVT/GI PPX: heparin SQ/ protonix 


D/W RN and RT, 





CODE:FULL











SHARYN SOLORZANO MD                 May 27, 2020 10:06

## 2020-05-27 NOTE — PDOC
G I PROGRESS NOTE


Subjective


Weak.  Still issues with dyspnea.


Physical Exam


Lungs with diminished sounds.


RRR, tachy.


Soft/doughy.  Diminished bowel sounds. 3 drains with brownish fluid.


Review of Relevant


I have reviewed the following items josy (where applicable) has been applied.


Labs





Laboratory Tests








Test


 5/25/20


17:46 5/25/20


23:55 5/26/20


05:45 5/26/20


06:00


 


Glucose (Fingerstick)


 173 mg/dL


(70-99) 166 mg/dL


(70-99) 


 170 mg/dL


(70-99)


 


White Blood Count


 


 


 11.4 x10^3/uL


(4.0-11.0) 





 


Red Blood Count


 


 


 2.80 x10^6/uL


(3.50-5.40) 





 


Hemoglobin


 


 


 8.0 g/dL


(12.0-15.5) 





 


Hematocrit


 


 


 24.4 %


(36.0-47.0) 





 


Mean Corpuscular Volume   87 fL ()  


 


Mean Corpuscular Hemoglobin   28 pg (25-35)  


 


Mean Corpuscular Hemoglobin


Concent 


 


 33 g/dL


(31-37) 





 


Red Cell Distribution Width


 


 


 19.5 %


(11.5-14.5) 





 


Platelet Count


 


 


 325 x10^3/uL


(140-400) 





 


Neutrophils (%) (Auto)   72 % (31-73)  


 


Lymphocytes (%) (Auto)   17 % (24-48)  


 


Monocytes (%) (Auto)   9 % (0-9)  


 


Eosinophils (%) (Auto)   2 % (0-3)  


 


Basophils (%) (Auto)   0 % (0-3)  


 


Neutrophils # (Auto)


 


 


 8.2 x10^3/uL


(1.8-7.7) 





 


Lymphocytes # (Auto)


 


 


 2.0 x10^3/uL


(1.0-4.8) 





 


Monocytes # (Auto)


 


 


 1.0 x10^3/uL


(0.0-1.1) 





 


Eosinophils # (Auto)


 


 


 0.2 x10^3/uL


(0.0-0.7) 





 


Basophils # (Auto)


 


 


 0.0 x10^3/uL


(0.0-0.2) 





 


Sodium Level


 


 


 134 mmol/L


(136-145) 





 


Potassium Level


 


 


 4.6 mmol/L


(3.5-5.1) 





 


Chloride Level


 


 


 97 mmol/L


() 





 


Carbon Dioxide Level


 


 


 33 mmol/L


(21-32) 





 


Anion Gap   4 (6-14)  


 


Blood Urea Nitrogen


 


 


 28 mg/dL


(7-20) 





 


Creatinine


 


 


 0.9 mg/dL


(0.6-1.0) 





 


Estimated GFR


(Cockcroft-Gault) 


 


 66.5 


 





 


Glucose Level


 


 


 183 mg/dL


(70-99) 





 


Calcium Level


 


 


 10.0 mg/dL


(8.5-10.1) 





 


Test


 5/26/20


17:52 5/26/20


23:28 5/27/20


06:07 





 


Glucose (Fingerstick)


 182 mg/dL


(70-99) 161 mg/dL


(70-99) 142 mg/dL


(70-99) 











Laboratory Tests








Test


 5/26/20


17:52 5/26/20


23:28 5/27/20


06:07


 


Glucose (Fingerstick)


 182 mg/dL


(70-99) 161 mg/dL


(70-99) 142 mg/dL


(70-99)








Microbiology


5/17/20 Blood Culture - Final, Complete


          NO GROWTH AFTER 5 DAYS


5/6/20 Fungal Culture - Final, Complete


         


5/6/20 Fungal Culture Result 1 - Final, Complete


         


4/30/20 Aerobic Culture - Final, Complete


          


4/30/20 Aerobic Culture Result 1 (ANSON) - Final, Complete


          


4/30/20 Gram Stain - Final, Complete


          


4/30/20 Gram Stain Result 1 (ANSON) - Final, Complete


          


4/30/20 Gram Stain Result 2 (ANSON) - Final, Complete


          


4/12/20 Urine Culture - Final, Complete


          


4/12/20 Urine Culture Result 1 (ANSON) - Final, Complete


Vitals/I & O





Vital Sign - Last 24 Hours








 5/26/20 5/26/20 5/26/20 5/26/20





 11:00 11:21 11:30 12:00


 


Pulse 134   


 


Resp 26 25  


 


B/P (MAP) 100/69 (79)   


 


Pulse Ox 99 97 100 


 


O2 Delivery Tracheal Collar Tracheal Collar Tracheal Collar Trach Collar


 


O2 Flow Rate 8.0 2.0  8.0





 5/26/20 5/26/20 5/26/20 5/26/20





 12:00 13:00 13:27 14:00


 


Temp 98.9   





 98.9   


 


Pulse 115 110  133


 


Resp 20 20  36


 


B/P (MAP)  109/72 (84)  131/83 (99)


 


Pulse Ox 99 99 99 99


 


O2 Delivery Tracheal Collar Tracheal Collar Nasal Cannula Tracheal Collar


 


O2 Flow Rate 8.0 8.0 3.0 8.0


 


    





    





 5/26/20 5/26/20 5/26/20 5/26/20





 15:00 16:00 16:00 16:26


 


Temp  98.8  





  98.8  


 


Pulse 121 118  


 


Resp 25 21  24


 


B/P (MAP) 119/78 (92) 121/78 (92)  


 


Pulse Ox 99 99  99


 


O2 Delivery Tracheal Collar Tracheal Collar Trach Collar Tracheal Collar


 


O2 Flow Rate 8.0 8.0 8.0 8.0


 


    





    





 5/26/20 5/26/20 5/26/20 5/26/20





 16:56 17:00 18:00 19:00


 


Pulse  138 110 117


 


Resp 26 30 22 15


 


B/P (MAP)  135/75 (95) 125/71 (89) 108/74 (85)


 


Pulse Ox 98 99 99 99


 


O2 Delivery Nasal Cannula Tracheal Collar Tracheal Collar Tracheal Collar


 


O2 Flow Rate 3.0 8.0 8.0 8.0





 5/26/20 5/26/20 5/26/20 5/26/20





 19:45 20:00 20:00 21:00


 


Temp  98.4  





  98.4  


 


Pulse  118  120


 


Resp  16  16


 


B/P (MAP)  102/62 (75)  112/68 (83)


 


Pulse Ox 96 99  99


 


O2 Delivery Tracheal Collar Tracheal Collar Trach Collar Tracheal Collar


 


O2 Flow Rate  8.0 8.0 8.0


 


    





    





 5/26/20 5/26/20 5/26/20 5/26/20





 21:01 21:31 22:00 23:00


 


Pulse   112 121


 


Resp 27 16 23 26


 


B/P (MAP)   108/63 (78) 107/67 (80)


 


Pulse Ox 99 99 99 94


 


O2 Delivery   Tracheal Collar Tracheal Collar


 


O2 Flow Rate 8.0 8.0 8.0 8.0





 5/27/20 5/27/20 5/27/20 5/27/20





 00:00 00:01 01:00 02:00


 


Temp  98.1  





  98.1  


 


Pulse  112 115 113


 


Resp  24 34 14


 


B/P (MAP)  118/64 (82) 97/59 (72) 97/48 (64)


 


Pulse Ox  97 94 96


 


O2 Delivery Trach Collar Tracheal Collar Tracheal Collar Room Air


 


O2 Flow Rate 8.0 8.0 8.0 


 


    





    





 5/27/20 5/27/20 5/27/20 5/27/20





 03:00 04:00 04:00 05:00


 


Temp   98.1 





   98.1 


 


Pulse 124  121 120


 


Resp 30  30 26


 


B/P (MAP) 106/56 (73)  127/60 (82) 107/70 (82)


 


Pulse Ox 96  94 94


 


O2 Delivery Room Air Trach Collar Room Air Room Air


 


O2 Flow Rate  8.0  


 


    





    





 5/27/20 5/27/20 5/27/20 5/27/20





 06:00 06:11 06:41 07:00


 


Pulse 120   124


 


Resp 26 27 26 25


 


B/P (MAP) 124/70 (88)   117/61 (79)


 


Pulse Ox 94 94 94 95


 


O2 Delivery Room Air Room Air Room Air Room Air





 5/27/20 5/27/20 5/27/20 5/27/20





 08:00 08:00 08:12 09:00


 


Temp   98.7 





   98.7 


 


Pulse   129 136


 


Resp   33 26


 


B/P (MAP)   130/69 (89) 122/70 (87)


 


Pulse Ox  95 94 96


 


O2 Delivery Room Air Room Air Room Air Room Air


 


    





    





 5/27/20   





 10:00   


 


Pulse 135   


 


Resp 33   


 


B/P (MAP) 164/93 (116)   


 


Pulse Ox 96   


 


O2 Delivery Room Air   














Intake and Output   


 


 5/26/20 5/26/20 5/27/20





 14:59 22:59 06:59


 


Intake Total  1195 ml 928.0 ml


 


Output Total 690 ml 555 ml 480 ml


 


Balance -690 ml 640 ml 448.0 ml








Images


No recent CT; been ~3 weeks.


Problem List


Problems


Medical Problems:


(1) Acute pancreatitis


Status: Acute  





(2) Cholelithiasis


Status: Acute  





Assessment


Gallstone pancreatitis.  S/p percutaneous drainage of pseudocyst(s)--status?


Ongoing respiratory issues/effusions.


Cholelithiasis


Plan of Care Note


Continue support.


Will check CT re: status of cyst(s).





Hemodynamically unstable?:  No


Is patient in severe pain?:  No


Is NPO status required?:  Yes











MARCO ANTONIO LONGO MD          May 27, 2020 11:01

## 2020-05-27 NOTE — RAD
CT Abdomen and Pelvis without contrast  

 

History: Status post pseudocyst drainage

 

Technique: Noncontrast CT imaging was performed of the abdomen and pelvis.

Multiplanar images are reviewed.  Exposure: One or more of the following 

individualized dose reduction techniques were utilized for this 

examination:  1. Automated exposure control  2. Adjustment of the mA 

and/or kV according to patient size  3. Use of iterative reconstruction 

technique.

 

Comparison:  May 4, 2020; May 8, 2020

 

Findings: As seen on more recent exam, there is pigtail catheter in the 

right abdomen located inferior to expected region of the pancreatic head 

although normal pancreatic parenchyma is poorly discerned as replaced by a

large area of somewhat heterogeneous, mostly fluid like density. Near the 

catheter, there is residual although smaller oblong fluid collection 

estimated about 10.1 cm transverse by 2 cm AP by about 8.5 cm cc. This 

could be a communication with persistent prominent area of fluid density 

in the region of expected pancreas as this area is smaller, now measures 

about 5.2 cm AP by about 8.7 cm CC by about 13 cm transverse versus 

previously about 7.5 cm AP by 10.1 cm CC by about 15.6 cm transverse. 

There is also now a pigtail catheter in the left anterior pelvis. There 

are residual pockets of fluid in the pelvis although significantly 

decreased. Largest pocket of fluid located more medial to the left pigtail

catheter now measures about 7.9 cm transverse by 2.7 cm AP by 3.8 cm cc. 

There is again some extent of fluid density along the paracolic gutters 

bilaterally, fairly similar on the right although somewhat larger on the 

left. Fluid collection of the left paracolic gutter measures about 11.6 cm

transverse oblique by 5.4 cm of the oblique as previously about 10.6 cm x 

4.7 cm in similar measurement planes. There is now a right pigtail 

catheter terminating just anterior to the right iliac bone, somewhat 

larger pocket of fluid adjacent to the catheter estimated about 5.8 cm 

transverse by 4.9 cm. There is again extent of fluid collections along the

anterior margins of the iliac bones bilaterally. No significant free air 

is identified. Bowel is not significantly dilated.

 

There is catheter in the urinary bladder. There are small dependent 

pleural effusions bilaterally, left greater than right. There is some left

lower lobe compressive atelectasis near effusion. Enteric catheter 

terminates in the stomach. There is cholelithiasis, nonspecific mild 

pericholecystic fluid. There is mild right hydronephrosis although 

decreased, no left hydronephrosis. No new obvious abnormality is 

identified of the liver, again hypodense lesion of the left lobe about 1.2

cm with density characteristics suggestive of a cyst.

 

Impression: 

 

1. There are now 3 pigtail catheters as described, residual fluid 

collections as stated mostly smaller other than larger collection of the 

inferior left paracolic gutter.

2. There are small, left greater than right pleural effusions, compressive

atelectasis of the left lower lobe.

3. There is cholelithiasis.

 

Electronically signed by: Anival Greene MD (5/27/2020 2:40 PM) [id number]

## 2020-05-27 NOTE — PDOC
SURGICAL PROGRESS NOTE


Subjective


Pt with c/o not being able to breathe with valve on


Vital Signs





Vital Signs








  Date Time  Temp Pulse Resp B/P (MAP) Pulse Ox O2 Delivery O2 Flow Rate FiO2


 


5/27/20 09:00  136 26 122/70 (87) 96 Room Air  


 


5/27/20 08:12 98.7       





 98.7       


 


5/27/20 04:00        








I&O











Intake and Output 


 


 5/27/20





 07:00


 


Intake Total 2123.0 ml


 


Output Total 1675 ml


 


Balance 448.0 ml


 


 


 


IV Total 2123.0 ml


 


Output Urine Total 1625 ml


 


Drainage Total 50 ml








General:  Alert, Cooperative


Abdomen:  Soft


Labs





Laboratory Tests








Test


 5/25/20


17:46 5/25/20


23:55 5/26/20


05:45 5/26/20


06:00


 


Glucose (Fingerstick)


 173 mg/dL


(70-99) 166 mg/dL


(70-99) 


 170 mg/dL


(70-99)


 


White Blood Count


 


 


 11.4 x10^3/uL


(4.0-11.0) 





 


Red Blood Count


 


 


 2.80 x10^6/uL


(3.50-5.40) 





 


Hemoglobin


 


 


 8.0 g/dL


(12.0-15.5) 





 


Hematocrit


 


 


 24.4 %


(36.0-47.0) 





 


Mean Corpuscular Volume   87 fL ()  


 


Mean Corpuscular Hemoglobin   28 pg (25-35)  


 


Mean Corpuscular Hemoglobin


Concent 


 


 33 g/dL


(31-37) 





 


Red Cell Distribution Width


 


 


 19.5 %


(11.5-14.5) 





 


Platelet Count


 


 


 325 x10^3/uL


(140-400) 





 


Neutrophils (%) (Auto)   72 % (31-73)  


 


Lymphocytes (%) (Auto)   17 % (24-48)  


 


Monocytes (%) (Auto)   9 % (0-9)  


 


Eosinophils (%) (Auto)   2 % (0-3)  


 


Basophils (%) (Auto)   0 % (0-3)  


 


Neutrophils # (Auto)


 


 


 8.2 x10^3/uL


(1.8-7.7) 





 


Lymphocytes # (Auto)


 


 


 2.0 x10^3/uL


(1.0-4.8) 





 


Monocytes # (Auto)


 


 


 1.0 x10^3/uL


(0.0-1.1) 





 


Eosinophils # (Auto)


 


 


 0.2 x10^3/uL


(0.0-0.7) 





 


Basophils # (Auto)


 


 


 0.0 x10^3/uL


(0.0-0.2) 





 


Sodium Level


 


 


 134 mmol/L


(136-145) 





 


Potassium Level


 


 


 4.6 mmol/L


(3.5-5.1) 





 


Chloride Level


 


 


 97 mmol/L


() 





 


Carbon Dioxide Level


 


 


 33 mmol/L


(21-32) 





 


Anion Gap   4 (6-14)  


 


Blood Urea Nitrogen


 


 


 28 mg/dL


(7-20) 





 


Creatinine


 


 


 0.9 mg/dL


(0.6-1.0) 





 


Estimated GFR


(Cockcroft-Gault) 


 


 66.5 


 





 


Glucose Level


 


 


 183 mg/dL


(70-99) 





 


Calcium Level


 


 


 10.0 mg/dL


(8.5-10.1) 





 


Test


 5/26/20


17:52 5/26/20


23:28 5/27/20


06:07 





 


Glucose (Fingerstick)


 182 mg/dL


(70-99) 161 mg/dL


(70-99) 142 mg/dL


(70-99) 











Laboratory Tests








Test


 5/26/20


17:52 5/26/20


23:28 5/27/20


06:07


 


Glucose (Fingerstick)


 182 mg/dL


(70-99) 161 mg/dL


(70-99) 142 mg/dL


(70-99)








Problem List


Problems


Medical Problems:


(1) Acute pancreatitis


Status: Acute  





(2) Cholelithiasis


Status: Acute  








Assessment/Plan


s/p lap exp


cont supportive care











GAMAL ZHOU MD             May 27, 2020 10:05

## 2020-05-27 NOTE — PDOC
Infectious Disease Note


Subjective:


Subjective


Patient continues to have a lot of respiratory secretions


Afebrile last 24-hour





Vital Signs:


Vital Signs





Vital Signs








  Date Time  Temp Pulse Resp B/P (MAP) Pulse Ox O2 Delivery O2 Flow Rate FiO2


 


5/27/20 08:12 98.7 129 33 130/69 (89) 94 Room Air  





 98.7       


 


5/27/20 04:00        











Physical Exam:


PHYSICAL EXAM


GENERAL: Just got up in the chair having a lot of coughing


HEENT:  Oral cavity clear,  NGT


NECK:  Trach shield


LUNGS: A lot of coughing with productive sputum from the tracheostomy site


HEART:  S1, S2, regular 


ABDOMEN: mod distention, hypoactive BS, tender, + drains x 3


: [place] (4/14)


EXTREMITIES: Generalized edema, improving, no cyanosis, SCDs bilaterally 


SKIN: Warm and dry.  No generalized rash.  


CNS: Very weak 


RUE-PICC (4/30) clean





Medications:


Inpatient Meds:





Current Medications








 Medications


  (Trade)  Dose


 Ordered  Sig/Yee  Start Time


 Stop Time Status Last Admin


Dose Admin


 


 Acetaminophen


  (Tylenol Supp)  650 mg  PRN Q6HRS  PRN  3/24/20 10:30


    5/5/20 09:12


650 MG


 


 Acetaminophen


  (Tylenol)  650 mg  PRN Q6HRS  PRN  3/21/20 03:36


 5/13/20 10:25 DC 4/16/20 19:56


650 MG


 


 Albumin Human  100 ml @ 


 100 mls/hr  1X PRN  PRN  5/19/20 01:30


     





 


 Albuterol Sulfate


  (Ventolin Neb


 Soln)  2.5 mg  1X  ONCE  3/17/20 22:30


 3/17/20 22:31 DC 3/18/20 00:56


2.5 MG


 


 Alteplase,


 Recombinant


  (Cathflo For


 Central Catheter


 Clearance)  1 mg  1X  ONCE  4/24/20 10:45


 4/24/20 10:46 DC 4/24/20 11:44


1 MG


 


 Amino Acids/


 Glycerin/


 Electrolytes  1,000 ml @ 


 75 mls/hr  [id number]  4/20/20 21:15


   UNV  





 


 Artificial Tears


  (Artificial


 Tears)  1 drop  PRN Q15MIN  PRN  4/29/20 05:30


    5/24/20 11:15


1 DROP


 


 Atenolol


  (Tenormin)  100 mg  DAILY  3/17/20 09:00


 3/16/20 20:08 DC  





 


 Atropine Sulfate


  (ATROPINE 0.5mg


 SYRINGE)  0.5 mg  PRN Q5MIN  PRN  4/2/20 08:15


     





 


 Barium Sulfate


  (Varibar Thin


 Liquid Apple)  148 gm  1X  ONCE  5/26/20 11:45


 5/26/20 11:49 DC  





 


 Benzocaine


  (Hurricaine One)  1 spray  1X  ONCE  3/20/20 14:30


 3/20/20 14:31 DC 3/20/20 16:38


1 SPRAY


 


 Bisacodyl


  (Dulcolax Supp)  10 mg  STK-MED ONCE  4/27/20 10:59


 4/27/20 10:59 DC  





 


 Bumetanide


  (Bumex)  2 mg  DAILY  5/8/20 10:00


 5/18/20 17:15 DC 5/18/20 08:07


2 MG


 


 Bupivacaine HCl/


 Epinephrine Bitart


  (Sensorcain-Epi


 0.5%-1:099093 Mpf)  30 ml  STK-MED ONCE  4/27/20 10:58


 4/27/20 10:58 DC 4/27/20 12:01


7 ML


 


 Calcium Carbonate/


 Glycine


  (Tums)  500 mg  PRN [id number]  PRN  3/18/20 17:45


 5/13/20 10:25 DC  





 


 Calcium Chloride


 1000 mg/Sodium


 Chloride  110 ml @ 


 220 mls/hr  1X  ONCE  3/17/20 22:30


 3/17/20 22:59 DC 3/17/20 22:11


220 MLS/HR


 


 Calcium Chloride


 3000 mg/Sodium


 Chloride  1,030 ml @ 


 50 mls/hr  [id number]  3/19/20 08:00


 3/21/20 15:23 DC 3/21/20 02:17


50 MLS/HR


 


 Calcium Gluconate


  (Calcium


 Gluconate)  2,000 mg  1X  ONCE  3/19/20 02:15


 3/19/20 02:16 DC 3/19/20 02:19


2,000 MG


 


 Calcium Gluconate


 1000 mg/Sodium


 Chloride  110 ml @ 


 220 mls/hr  1X  ONCE  3/18/20 03:30


 3/18/20 03:59 DC 3/18/20 03:21


220 MLS/HR


 


 Calcium Gluconate


 2000 mg/Sodium


 Chloride  120 ml @ 


 220 mls/hr  1X  ONCE  3/18/20 07:30


 3/18/20 08:02 DC 3/18/20 09:05


220 MLS/HR


 


 Cefepime HCl


  (Maxipime)  2 gm  Q12HR  3/25/20 09:00


 4/8/20 09:58 DC 4/7/20 20:56


2 GM


 


 Cellulose


  (Surgicel


 Fibrillar 1x2)  1 each  STK-MED ONCE  4/6/20 11:00


 4/6/20 11:01 DC  





 


 Cellulose


  (Surgicel


 Hemostat 2x14)  1 each  STK-MED ONCE  4/27/20 10:58


 4/27/20 10:59 DC  





 


 Cellulose


  (Surgicel


 Hemostat 4x8)  1 each  STK-MED ONCE  4/27/20 10:58


 4/27/20 10:59 DC  





 


 Cyclobenzaprine


 HCl


  (Flexeril)  10 mg  PRN Q6HRS  PRN  4/30/20 10:45


     





 


 Daptomycin 430 mg/


 Sodium Chloride  50 ml @ 


 100 mls/hr  Q24H  4/25/20 13:00


 4/30/20 20:58 DC 4/30/20 13:00


100 MLS/HR


 


 Daptomycin 450 mg/


 Sodium Chloride  50 ml @ 


 100 mls/hr  Q24H  5/17/20 09:00


 5/21/20 08:30 DC 5/20/20 09:25


100 MLS/HR


 


 Daptomycin 485 mg/


 Sodium Chloride  50 ml @ 


 100 mls/hr  Q24H  5/4/20 11:00


 5/12/20 07:44 DC 5/11/20 13:10


100 MLS/HR


 


 Daptomycin 500 mg/


 Sodium Chloride  50 ml @ 


 100 mls/hr  Q48H  3/25/20 08:30


 4/10/20 10:07 DC 4/10/20 09:57


100 MLS/HR


 


 Dexamethasone


 Sodium Phosphate


  (Decadron)  4 mg  STK-MED ONCE  4/27/20 10:56


 4/27/20 10:57 DC  





 


 Dexmedetomidine


 HCl 400 mcg/


 Sodium Chloride  100 ml @ 0


 mls/hr  CONT  PRN  4/2/20 08:15


    5/26/20 15:16


0.02 MLS/HR


 


 Dextrose


  (Dextrose


 50%-Water Syringe)  12.5 gm  PRN Q15MIN  PRN  3/16/20 09:30


     





 


 Digoxin


  (Lanoxin)  125 mcg  1X  ONCE  3/19/20 18:00


 3/19/20 18:01 DC 3/19/20 17:10


125 MCG


 


 Diphenhydramine


 HCl


  (Benadryl)  25 mg  1X PRN  PRN  4/24/20 15:45


 4/25/20 15:44 DC  





 


 Duloxetine HCl


  (Cymbalta)  30 mg  DAILY  5/10/20 14:00


 5/13/20 10:25 DC 5/11/20 09:48


30 MG


 


 Enoxaparin Sodium


  (Lovenox 100mg


 Syringe)  100 mg  Q12HR  4/21/20 21:00


   UNV  





 


 Enoxaparin Sodium


  (Lovenox 40mg


 Syringe)  40 mg  Q24H  5/17/20 17:00


    5/26/20 17:53


40 MG


 


 Etomidate


  (Amidate)  8 mg  1X  ONCE  3/23/20 08:30


 3/23/20 08:31 DC 3/23/20 08:33


8 MG


 


 Fentanyl Citrate


  (Fentanyl 2ml


 Vial)  25 mcg  PRN Q4HRS  PRN  5/18/20 13:15


    5/24/20 06:36


25 MCG


 


 Fentanyl Citrate


  (Fentanyl 5ml


 Vial)  250 mcg  1X  ONCE  5/8/20 09:15


 5/8/20 09:16 DC 5/8/20 09:30


50 MCG


 


 Furosemide


  (Lasix)  40 mg  [id number]  5/19/20 14:00


    5/27/20 08:16


40 MG


 


 Haloperidol


 Lactate


  (Haldol Inj)  3 mg  1X  ONCE  5/4/20 14:30


 5/4/20 14:31 DC 5/4/20 14:37


3 MG


 


 Heparin Sodium


  (Porcine)


  (Hep Lock Adult)  500 unit  STK-MED ONCE  4/7/20 09:29


 4/7/20 09:30 DC  





 


 Heparin Sodium


  (Porcine)


  (Heparin Sodium)  5,000 unit  Q12HR  4/27/20 21:00


 5/7/20 09:59 DC 5/6/20 20:57


5,000 UNIT


 


 Heparin Sodium


  (Porcine) 1000


 unit/Sodium


 Chloride  1,001 ml @ 


 1,001 mls/hr  1X  ONCE  4/27/20 12:00


 4/27/20 12:59 DC  





 


 Hydromorphone HCl


  (Dilaudid


 Standard PCA)  12 mg  STK-MED ONCE  5/1/20 15:50


 5/12/20 11:24 DC  





 


 Hydromorphone HCl


  (Dilaudid)  1 mg  PRN Q4HRS  PRN  5/4/20 19:00


 5/18/20 17:10 DC 5/18/20 06:25


1 MG


 


 Info


  (CONTRAST GIVEN


 -- Rx MONITORING)  1 each  PRN DAILY  PRN  3/30/20 11:45


 4/1/20 11:44 DC  





 


 Info


  (Icu Electrolyte


 Protocol)  1 ea  CONT PRN  PRN  3/29/20 13:15


     





 


 Info


  (PHARMACY


 MONITORING -- do


 not chart)  1 each  PRN DAILY  PRN  4/24/20 15:45


 5/26/20 14:14 DC  





 


 Info


  (Tpn Per


 Pharmacy)  1 each  PRN DAILY  PRN  3/18/20 12:30


   UNV  





 


 Insulin Human


 Lispro


  (HumaLOG)  0-9 UNITS  Q6HRS  3/16/20 09:30


    5/26/20 23:30


4 UNITS


 


 Insulin Human


 Regular


  (HumuLIN R VIAL)  5 unit  1X  ONCE  3/17/20 22:30


 3/17/20 22:31 DC 3/17/20 22:14


5 UNIT


 


 Iohexol


  (Omnipaque 240


 Mg/ml)  30 ml  1X  ONCE  3/30/20 11:30


 3/30/20 11:33 DC 3/30/20 11:30


30 ML


 


 Iohexol


  (Omnipaque 300


 Mg/ml)  50 ml  STK-MED ONCE  4/27/20 10:58


 4/27/20 10:59 DC  





 


 Iohexol


  (Omnipaque 350


 Mg/ml)  90 ml  1X  ONCE  3/16/20 03:30


 3/16/20 03:31 DC 3/16/20 03:25


90 ML


 


 Ketorolac


 Tromethamine


  (Toradol 30mg


 Vial)  30 mg  1X  ONCE  3/16/20 03:00


 3/16/20 03:01 DC 3/16/20 02:54


30 MG


 


 Lidocaine HCl


  (Buffered


 Lidocaine 1%)  6 ml  1X  ONCE  5/12/20 14:15


 5/12/20 14:16 DC 5/12/20 14:15


3 ML


 


 Lidocaine HCl


  (Glydo


  (Lidocaine) Jelly)  1 thomas  1X  ONCE  3/20/20 14:30


 3/20/20 14:31 DC 3/20/20 16:38


1 THOMAS


 


 Lidocaine HCl


  (Xylocaine-Mpf


 1% 2ml Vial)  2 ml  PRN 1X  PRN  4/27/20 07:00


 4/28/20 06:59 DC  





 


 Linezolid/Dextrose  300 ml @ 


 300 mls/hr  Q12HR  5/17/20 09:00


 5/20/20 08:11 DC 5/19/20 21:08


300 MLS/HR


 


 Lorazepam


  (Ativan Inj)  2 mg  PRN Q4HRS  PRN  5/17/20 19:15


    5/26/20 23:23


2 MG


 


 Magnesium Sulfate  50 ml @ 25


 mls/hr  1X  ONCE  5/10/20 09:00


 5/10/20 10:59 DC 5/10/20 10:44


25 MLS/HR


 


 Meropenem 1 gm/


 Sodium Chloride  100 ml @ 


 200 mls/hr  Q8HRS  4/28/20 14:00


 5/22/20 09:31 DC 5/22/20 05:53


200 MLS/HR


 


 Meropenem 500 mg/


 Sodium Chloride  50 ml @ 


 100 mls/hr  Q6HRS  5/25/20 18:00


    5/27/20 06:02


100 MLS/HR


 


 Metoclopramide HCl


  (Reglan Vial)  10 mg  PRN Q3HRS  PRN  5/9/20 16:45


    5/14/20 04:25


10 MG


 


 Metoprolol


 Tartrate


  (Lopressor Vial)  5 mg  1X  ONCE  5/17/20 15:15


 5/17/20 15:16 DC 5/17/20 15:31


5 MG


 


 Metronidazole  100 ml @ 


 100 mls/hr  Q8HRS  4/14/20 10:00


 4/21/20 08:10 DC 4/21/20 06:04


100 MLS/HR


 


 Micafungin Sodium


 100 mg/Dextrose  100 ml @ 


 100 mls/hr  Q24H  5/4/20 11:00


    5/26/20 12:17


100 MLS/HR


 


 Midazolam HCl


  (Versed)  5 mg  1X  ONCE  5/8/20 09:15


 5/8/20 09:16 DC 5/8/20 09:30


1 MG


 


 Midazolam HCl 100


 mg/Sodium Chloride  100 ml @ 7


 mls/hr  CONT  PRN  3/28/20 16:00


    4/8/20 15:35


7 MLS/HR


 


 Midazolam HCl 50


 mg/Sodium Chloride  50 ml @ 0


 mls/hr  CONT  PRN  3/23/20 08:15


 3/28/20 15:59 DC 3/26/20 22:39


7 MLS/HR


 


 Morphine Sulfate


  (Morphine


 Sulfate)  2 mg  PRN Q2HR  PRN  3/16/20 05:00


 3/17/20 14:15 DC 3/17/20 12:26


2 MG


 


 Multi-Ingred


 Cream/Lotion/Oil/


 Oint


  (Artificial


 Tears Eye


 Ointment)  1 thomas  PRN Q1HR  PRN  3/25/20 17:30


    4/13/20 08:19


1 THOMAS


 


 Naloxone HCl


  (Narcan)  0.4 mg  PRN Q2MIN  PRN  4/27/20 14:30


   UNV  





 


 Norepinephrine


 Bitartrate 8 mg/


 Dextrose  258 ml @ 


 17.299 mls/


 hr  CONT  PRN  3/17/20 15:30


 4/17/20 09:19 DC 4/14/20 12:48


20.9 MLS/HR


 


 Ondansetron HCl


  (Zofran)  4 mg  STK-MED ONCE  4/27/20 10:56


 4/27/20 10:57 DC  





 


 Pantoprazole


 Sodium


  (PROTONIX VIAL


 for IV PUSH)  40 mg  DAILYAC  3/16/20 11:30


    5/27/20 08:16


40 MG


 


 Phenylephrine HCl


  (PHENYLEPHRINE


 in 0.9% NACL PF)  1 mg  STK-MED ONCE  4/27/20 12:34


 4/27/20 12:34 DC  





 


 Piperacillin Sod/


 Tazobactam Sod


 4.5 gm/Sodium


 Chloride  100 ml @ 


 200 mls/hr  1X  ONCE  3/16/20 06:00


 3/16/20 06:29 DC 3/16/20 05:44


200 MLS/HR


 


 Potassium


 Chloride 110 meq/


 Magnesium Sulfate


 20 meq/


 Multivitamins 10


 ml/Chromium/


 Copper/Manganese/


 Seleni/Zn 1 ml/


 Insulin Human


 Regular 15 unit/


 Total Parenteral


 Nutrition/Amino


 Acids/Dextrose/


 Fat Emulsion


 Intravenous  1,800 ml @ 


 75 mls/hr  TPN  CONT  5/24/20 22:00


 5/25/20 21:59 DC 5/24/20 22:48


75 MLS/HR


 


 Potassium


 Chloride 15 meq/


 Bicarbonate


 Dialysis Soln w/


 out KCl  5,007.5 ml


  @ 1,000 mls/


 hr  Q5H1M  3/29/20 20:00


 4/2/20 13:08 DC 4/1/20 18:14


1,000 MLS/HR


 


 Potassium


 Chloride 20 meq/


 Bicarbonate


 Dialysis Soln w/


 out KCl  5,010 ml @ 


 1,000 mls/hr  Q5H1M  3/25/20 16:00


 3/29/20 19:59 DC 3/29/20 14:54


1,000 MLS/HR


 


 Potassium


 Chloride 70 meq/


 Magnesium Sulfate


 20 meq/


 Multivitamins 10


 ml/Chromium/


 Copper/Manganese/


 Seleni/Zn 1 ml/


 Insulin Human


 Regular 15 unit/


 Total Parenteral


 Nutrition/Amino


 Acids/Dextrose/


 Fat Emulsion


 Intravenous  1,800 ml @ 


 75 mls/hr  TPN  CONT  5/26/20 22:00


 5/27/20 21:59  5/26/20 22:27


75 MLS/HR


 


 Potassium


 Chloride 75 meq/


 Magnesium Sulfate


 15 meq/


 Multivitamins 10


 ml/Chromium/


 Copper/Manganese/


 Seleni/Zn 0.5 ml/


 Insulin Human


 Regular 15 unit/


 Total Parenteral


 Nutrition/Amino


 Acids/Dextrose/


 Fat Emulsion


 Intravenous  1,920 ml @ 


 80 mls/hr  TPN  CONT  5/9/20 22:00


 5/10/20 21:59 DC 5/9/20 22:41


80 MLS/HR


 


 Potassium


 Chloride 75 meq/


 Magnesium Sulfate


 15 meq/Calcium


 Gluconate 8 meq/


 Multivitamins 10


 ml/Chromium/


 Copper/Manganese/


 Seleni/Zn 0.5 ml/


 Insulin Human


 Regular 15 unit/


 Total Parenteral


 Nutrition/Amino


 Acids/Dextrose/


 Fat Emulsion


 Intravenous  1,920 ml @ 


 80 mls/hr  TPN  CONT  5/7/20 22:00


 5/8/20 21:59 DC 5/7/20 22:28


80 MLS/HR


 


 Potassium


 Chloride 75 meq/


 Magnesium Sulfate


 15 meq/Calcium


 Gluconate 8 meq/


 Multivitamins 10


 ml/Chromium/


 Copper/Manganese/


 Seleni/Zn 0.5 ml/


 Insulin Human


 Regular 20 unit/


 Total Parenteral


 Nutrition/Amino


 Acids/Dextrose/


 Fat Emulsion


 Intravenous  1,920 ml @ 


 80 mls/hr  TPN  CONT  5/6/20 22:00


 5/7/20 21:59 DC 5/6/20 22:00


80 MLS/HR


 


 Potassium


 Chloride 75 meq/


 Magnesium Sulfate


 15 meq/Calcium


 Gluconate 8 meq/


 Multivitamins 10


 ml/Chromium/


 Copper/Manganese/


 Seleni/Zn 0.5 ml/


 Insulin Human


 Regular 25 unit/


 Total Parenteral


 Nutrition/Amino


 Acids/Dextrose/


 Fat Emulsion


 Intravenous  1,920 ml @ 


 80 mls/hr  TPN  CONT  5/4/20 22:00


 5/5/20 21:59 DC 5/4/20 23:08


80 MLS/HR


 


 Potassium


 Chloride 75 meq/


 Magnesium Sulfate


 20 meq/Calcium


 Gluconate 10 meq/


 Multivitamins 10


 ml/Chromium/


 Copper/Manganese/


 Seleni/Zn 0.5 ml/


 Insulin Human


 Regular 25 unit/


 Total Parenteral


 Nutrition/Amino


 Acids/Dextrose/


 Fat Emulsion


 Intravenous  1,920 ml @ 


 80 mls/hr  TPN  CONT  5/3/20 22:00


 5/4/20 21:59 DC 5/3/20 22:04


80 MLS/HR


 


 Potassium


 Chloride 75 meq/


 Magnesium Sulfate


 20 meq/Calcium


 Gluconate 10 meq/


 Multivitamins 10


 ml/Chromium/


 Copper/Manganese/


 Seleni/Zn 0.5 ml/


 Insulin Human


 Regular 30 unit/


 Total Parenteral


 Nutrition/Amino


 Acids/Dextrose/


 Fat Emulsion


 Intravenous  1,920 ml @ 


 80 mls/hr  TPN  CONT  5/2/20 22:00


 5/3/20 22:00 DC 5/2/20 21:51


80 MLS/HR


 


 Potassium


 Chloride 80 meq/


 Magnesium Sulfate


 20 meq/


 Multivitamins 10


 ml/Chromium/


 Copper/Manganese/


 Seleni/Zn 0.5 ml/


 Insulin Human


 Regular 15 unit/


 Total Parenteral


 Nutrition/Amino


 Acids/Dextrose/


 Fat Emulsion


 Intravenous  1,920 ml @ 


 80 mls/hr  TPN  CONT  5/12/20 22:00


 5/13/20 21:59 DC 5/12/20 21:40


80 MLS/HR


 


 Potassium


 Chloride 80 meq/


 Magnesium Sulfate


 20 meq/


 Multivitamins 10


 ml/Chromium/


 Copper/Manganese/


 Seleni/Zn 1 ml/


 Insulin Human


 Regular 15 unit/


 Total Parenteral


 Nutrition/Amino


 Acids/Dextrose/


 Fat Emulsion


 Intravenous  1,920 ml @ 


 80 mls/hr  TPN  CONT  5/13/20 22:00


 5/14/20 21:59 DC 5/13/20 22:04


80 MLS/HR


 


 Potassium


 Chloride 90 meq/


 Magnesium Sulfate


 20 meq/


 Multivitamins 10


 ml/Chromium/


 Copper/Manganese/


 Seleni/Zn 1 ml/


 Insulin Human


 Regular 15 unit/


 Total Parenteral


 Nutrition/Amino


 Acids/Dextrose/


 Fat Emulsion


 Intravenous  1,800 ml @ 


 75 mls/hr  TPN  CONT  5/20/20 22:00


 5/21/20 21:59 DC 5/20/20 22:28


75 MLS/HR


 


 Potassium


 Chloride/Water  100 ml @ 


 100 mls/hr  1X  ONCE  5/14/20 11:00


 5/14/20 11:59 DC 5/14/20 11:34


100 MLS/HR


 


 Potassium


 Phosphate 20 mmol/


 Sodium Chloride  106.6667


 ml @ 


 51.667 m...  1X  ONCE  3/25/20 13:00


 3/25/20 15:03 DC 3/25/20 12:51


51.667 MLS/HR


 


 Potassium Acetate


 30 meq/Magnesium


 Sulfate 20 meq/


 Calcium Gluconate


 10 meq/


 Multivitamins 10


 ml/Chromium/


 Copper/Manganese/


 Seleni/Zn 0.5 ml/


 Insulin Human


 Regular 30 unit/


 Potassium


 Chloride 30 meq/


 Total Parenteral


 Nutrition/Amino


 Acids/Dextrose/


 Fat Emulsion


 Intravenous  1,920 ml @ 


 80 mls/hr  TPN  CONT  5/1/20 22:00


 5/2/20 21:59 DC 5/1/20 22:34


80 MLS/HR


 


 Potassium Acetate


 55 meq/Magnesium


 Sulfate 20 meq/


 Calcium Gluconate


 10 meq/


 Multivitamins 10


 ml/Chromium/


 Copper/Manganese/


 Seleni/Zn 0.5 ml/


 Insulin Human


 Regular 30 unit/


 Total Parenteral


 Nutrition/Amino


 Acids/Dextrose/


 Fat Emulsion


 Intravenous  1,920 ml @ 


 80 mls/hr  TPN  CONT  4/30/20 22:00


 5/1/20 21:59 DC 5/1/20 01:00


80 MLS/HR


 


 Potassium Acetate


 55 meq/Magnesium


 Sulfate 20 meq/


 Calcium Gluconate


 10 meq/


 Multivitamins 10


 ml/Chromium/


 Copper/Manganese/


 Seleni/Zn 0.5 ml/


 Insulin Human


 Regular 35 unit/


 Total Parenteral


 Nutrition/Amino


 Acids/Dextrose/


 Fat Emulsion


 Intravenous  1,920 ml @ 


 80 mls/hr  TPN  CONT  4/28/20 22:00


 4/29/20 21:59 DC 4/28/20 22:02


80 MLS/HR


 


 Potassium Acetate


 65 meq/Magnesium


 Sulfate 20 meq/


 Calcium Gluconate


 10 meq/


 Multivitamins 10


 ml/Chromium/


 Copper/Manganese/


 Seleni/Zn 0.5 ml/


 Insulin Human


 Regular 30 unit/


 Total Parenteral


 Nutrition/Amino


 Acids/Dextrose/


 Fat Emulsion


 Intravenous  1,920 ml @ 


 80 mls/hr  TPN  CONT  4/29/20 22:00


 4/30/20 21:59 DC 4/29/20 22:22


80 MLS/HR


 


 Prochlorperazine


 Edisylate


  (Compazine)  5 mg  PACU PRN  PRN  4/27/20 07:00


 4/28/20 06:59 DC  





 


 Propofol  20 ml @ As


 Directed  STK-MED ONCE  4/27/20 12:26


 4/27/20 12:27 DC  





 


 Ringer's Solution  1,000 ml @ 


 30 mls/hr  Q24H  4/27/20 07:00


 4/27/20 18:59 DC  





 


 Rocuronium Bromide


  (Zemuron)  50 mg  STK-MED ONCE  4/27/20 10:56


 4/27/20 10:57 DC  





 


 Saliva Substitute


  (Biotene


 Moisturizing


 Mouth)  2 spray  PRN Q15MIN  PRN  5/21/20 11:00


     





 


 Sevoflurane


  (Ultane)  60 ml  STK-MED ONCE  4/27/20 12:26


 4/27/20 12:27 DC  





 


 Sodium


 Bicarbonate 50


 meq/Sodium


 Chloride  1,050 ml @ 


 75 mls/hr  Q14H  3/18/20 07:30


 3/23/20 10:28 DC 3/22/20 21:10


75 MLS/HR


 


 Sodium Acetate 50


 meq/Potassium


 Acetate 55 meq/


 Magnesium Sulfate


 20 meq/Calcium


 Gluconate 10 meq/


 Multivitamins 10


 ml/Chromium/


 Copper/Manganese/


 Seleni/Zn 0.5 ml/


 Insulin Human


 Regular 35 unit/


 Total Parenteral


 Nutrition/Amino


 Acids/Dextrose/


 Fat Emulsion


 Intravenous  1,800 ml @ 


 75 mls/hr  TPN  CONT  4/25/20 22:00


 4/26/20 21:59 DC 4/25/20 22:03


75 MLS/HR


 


 Sodium Chloride  1,000 ml @ 


 25 mls/hr  Q24H  4/27/20 14:30


   UNV  





 


 Sodium Chloride


  (Normal Saline


 Flush)  3 ml  QSHIFT  PRN  4/27/20 13:45


     





 


 Sodium Chloride


 90 meq/Calcium


 Gluconate 10 meq/


 Multivitamins 10


 ml/Chromium/


 Copper/Manganese/


 Seleni/Zn 0.5 ml/


 Total Parenteral


 Nutrition/Amino


 Acids/Dextrose/


 Fat Emulsion


 Intravenous  1,512 ml @ 


 63 mls/hr  TPN  CONT  3/18/20 22:00


 3/19/20 21:59 DC 3/18/20 22:06


63 MLS/HR


 


 Sodium Chloride


 90 meq/Calcium


 Gluconate 10 meq/


 Multivitamins 10


 ml/Chromium/


 Copper/Manganese/


 Seleni/Zn 1 ml/


 Total Parenteral


 Nutrition/Amino


 Acids/Dextrose/


 Fat Emulsion


 Intravenous  55.005 ml


  @ 2.292


 mls/hr  TPN  CONT  3/18/20 22:00


 3/18/20 12:33 DC  





 


 Sodium Chloride


 90 meq/Magnesium


 Sulfate 10 meq/


 Calcium Gluconate


 20 meq/


 Multivitamins 10


 ml/Chromium/


 Copper/Manganese/


 Seleni/Zn 0.5 ml/


 Total Parenteral


 Nutrition/Amino


 Acids/Dextrose/


 Fat Emulsion


 Intravenous  1,512 ml @ 


 63 mls/hr  TPN  CONT  3/19/20 22:00


 3/20/20 21:59 DC 3/19/20 22:25


63 MLS/HR


 


 Sodium Chloride


 90 meq/Magnesium


 Sulfate 12 meq/


 Calcium Gluconate


 15 meq/


 Multivitamins 10


 ml/Chromium/


 Copper/Manganese/


 Seleni/Zn 0.5 ml/


 Insulin Human


 Regular 25 unit/


 Total Parenteral


 Nutrition/Amino


 Acids/Dextrose/


 Fat Emulsion


 Intravenous  1,400 ml @ 


 58.333 mls/


 hr  TPN  CONT  4/8/20 22:00


 4/9/20 21:59 DC 4/8/20 21:41


58.333 MLS/HR


 


 Sodium Chloride


 90 meq/Potassium


 Chloride 15 meq/


 Magnesium Sulfate


 12 meq/Calcium


 Gluconate 15 meq/


 Multivitamins 10


 ml/Chromium/


 Copper/Manganese/


 Seleni/Zn 0.5 ml/


 Insulin Human


 Regular 25 unit/


 Total Parenteral


 Nutrition/Amino


 Acids/Dextrose/


 Fat Emulsion


 Intravenous  1,400 ml @ 


 58.333 mls/


 hr  TPN  CONT  4/7/20 22:00


 4/8/20 21:59 DC 4/7/20 22:13


58.333 MLS/HR


 


 Sodium Chloride


 90 meq/Potassium


 Chloride 15 meq/


 Potassium


 Phosphate 10 mmol/


 Magnesium Sulfate


 8 meq/Calcium


 Gluconate 15 meq/


 Multivitamins 10


 ml/Chromium/


 Copper/Manganese/


 Seleni/Zn 0.5 ml/


 Insulin Human


 Regular 25 unit/


 Total Parenteral


 Nutrition/Amino


 Acids/Dextrose/


 Fat Emulsion


 Intravenous  1,400 ml @ 


 58.333 mls/


 hr  TPN  CONT  4/5/20 22:00


 4/6/20 21:59 DC 4/5/20 21:20


58.333 MLS/HR


 


 Sodium Chloride


 90 meq/Potassium


 Chloride 15 meq/


 Potassium


 Phosphate 10 mmol/


 Magnesium Sulfate


 10 meq/Calcium


 Gluconate 20 meq/


 Multivitamins 10


 ml/Chromium/


 Copper/Manganese/


 Seleni/Zn 0.5 ml/


 Total Parenteral


 Nutrition/Amino


 Acids/Dextrose/


 Fat Emulsion


 Intravenous  1,400 ml @ 


 58.333 mls/


 hr  TPN  CONT  3/23/20 22:00


 3/24/20 21:59 DC 3/23/20 21:42


58.333 MLS/HR


 


 Sodium Chloride


 90 meq/Potassium


 Chloride 15 meq/


 Potassium


 Phosphate 10 mmol/


 Magnesium Sulfate


 12 meq/Calcium


 Gluconate 15 meq/


 Multivitamins 10


 ml/Chromium/


 Copper/Manganese/


 Seleni/Zn 0.5 ml/


 Insulin Human


 Regular 25 unit/


 Total Parenteral


 Nutrition/Amino


 Acids/Dextrose/


 Fat Emulsion


 Intravenous  1,400 ml @ 


 58.333 mls/


 hr  TPN  CONT  4/6/20 22:00


 4/7/20 21:59 DC 4/6/20 22:24


58.333 MLS/HR


 


 Sodium Chloride


 90 meq/Potassium


 Chloride 15 meq/


 Potassium


 Phosphate 15 mmol/


 Magnesium Sulfate


 10 meq/Calcium


 Gluconate 15 meq/


 Multivitamins 10


 ml/Chromium/


 Copper/Manganese/


 Seleni/Zn 0.5 ml/


 Total Parenteral


 Nutrition/Amino


 Acids/Dextrose/


 Fat Emulsion


 Intravenous  1,400 ml @ 


 58.333 mls/


 hr  TPN  CONT  3/24/20 22:00


 3/25/20 21:59 DC 3/24/20 22:17


58.333 MLS/HR


 


 Sodium Chloride


 90 meq/Potassium


 Chloride 15 meq/


 Potassium


 Phosphate 15 mmol/


 Magnesium Sulfate


 10 meq/Calcium


 Gluconate 20 meq/


 Multivitamins 10


 ml/Chromium/


 Copper/Manganese/


 Seleni/Zn 0.5 ml/


 Total Parenteral


 Nutrition/Amino


 Acids/Dextrose/


 Fat Emulsion


 Intravenous  1,200 ml @ 


 50 mls/hr  TPN  CONT  3/22/20 22:00


 3/22/20 14:17 DC  





 


 Sodium Chloride


 90 meq/Potassium


 Chloride 15 meq/


 Potassium


 Phosphate 18 mmol/


 Magnesium Sulfate


 8 meq/Calcium


 Gluconate 15 meq/


 Multivitamins 10


 ml/Chromium/


 Copper/Manganese/


 Seleni/Zn 0.5 ml/


 Insulin Human


 Regular 10 unit/


 Total Parenteral


 Nutrition/Amino


 Acids/Dextrose/


 Fat Emulsion


 Intravenous  1,400 ml @ 


 58.333 mls/


 hr  TPN  CONT  3/27/20 22:00


 3/28/20 21:59 DC 3/27/20 21:43


58.333 MLS/HR


 


 Sodium Chloride


 90 meq/Potassium


 Chloride 15 meq/


 Potassium


 Phosphate 18 mmol/


 Magnesium Sulfate


 8 meq/Calcium


 Gluconate 15 meq/


 Multivitamins 10


 ml/Chromium/


 Copper/Manganese/


 Seleni/Zn 0.5 ml/


 Insulin Human


 Regular 15 unit/


 Total Parenteral


 Nutrition/Amino


 Acids/Dextrose/


 Fat Emulsion


 Intravenous  1,400 ml @ 


 58.333 mls/


 hr  TPN  CONT  3/30/20 22:00


 3/31/20 21:59 DC 3/30/20 21:47


58.333 MLS/HR


 


 Sodium Chloride


 90 meq/Potassium


 Chloride 15 meq/


 Potassium


 Phosphate 18 mmol/


 Magnesium Sulfate


 8 meq/Calcium


 Gluconate 15 meq/


 Multivitamins 10


 ml/Chromium/


 Copper/Manganese/


 Seleni/Zn 0.5 ml/


 Insulin Human


 Regular 20 unit/


 Total Parenteral


 Nutrition/Amino


 Acids/Dextrose/


 Fat Emulsion


 Intravenous  1,400 ml @ 


 58.333 mls/


 hr  TPN  CONT  4/2/20 22:00


 4/3/20 21:59 DC 4/2/20 22:45


58.333 MLS/HR


 


 Sodium Chloride


 90 meq/Potassium


 Chloride 15 meq/


 Potassium


 Phosphate 18 mmol/


 Magnesium Sulfate


 8 meq/Calcium


 Gluconate 15 meq/


 Multivitamins 10


 ml/Chromium/


 Copper/Manganese/


 Seleni/Zn 0.5 ml/


 Total Parenteral


 Nutrition/Amino


 Acids/Dextrose/


 Fat Emulsion


 Intravenous  1,400 ml @ 


 58.333 mls/


 hr  TPN  CONT  3/26/20 22:00


 3/27/20 21:59 DC 3/26/20 22:00


58.333 MLS/HR


 


 Sodium Chloride


 90 meq/Potassium


 Phosphate 15 mmol/


 Magnesium Sulfate


 12 meq/Calcium


 Gluconate 15 meq/


 Multivitamins 10


 ml/Chromium/


 Copper/Manganese/


 Seleni/Zn 0.5 ml/


 Insulin Human


 Regular 30 unit/


 Total Parenteral


 Nutrition/Amino


 Acids/Dextrose/


 Fat Emulsion


 Intravenous  1,400 ml @ 


 58.333 mls/


 hr  TPN  CONT  4/10/20 22:00


 4/11/20 21:59 DC 4/10/20 21:49


58.333 MLS/HR


 


 Sodium Chloride


 90 meq/Potassium


 Phosphate 15 mmol/


 Magnesium Sulfate


 12 meq/Calcium


 Gluconate 15 meq/


 Multivitamins 10


 ml/Chromium/


 Copper/Manganese/


 Seleni/Zn 0.5 ml/


 Insulin Human


 Regular 40 unit/


 Total Parenteral


 Nutrition/Amino


 Acids/Dextrose/


 Fat Emulsion


 Intravenous  1,400 ml @ 


 58.333 mls/


 hr  TPN  CONT  4/11/20 22:00


 4/12/20 21:59 DC 4/11/20 21:21


58.333 MLS/HR


 


 Sodium Chloride


 90 meq/Potassium


 Phosphate 19 mmol/


 Magnesium Sulfate


 12 meq/Calcium


 Gluconate 15 meq/


 Multivitamins 10


 ml/Chromium/


 Copper/Manganese/


 Seleni/Zn 0.5 ml/


 Insulin Human


 Regular 40 unit/


 Total Parenteral


 Nutrition/Amino


 Acids/Dextrose/


 Fat Emulsion


 Intravenous  1,400 ml @ 


 58.333 mls/


 hr  TPN  CONT  4/12/20 22:00


 4/13/20 21:59 DC 4/12/20 21:54


58.333 MLS/HR


 


 Sodium Chloride


 90 meq/Potassium


 Phosphate 5 mmol/


 Magnesium Sulfate


 12 meq/Calcium


 Gluconate 15 meq/


 Multivitamins 10


 ml/Chromium/


 Copper/Manganese/


 Seleni/Zn 0.5 ml/


 Insulin Human


 Regular 30 unit/


 Total Parenteral


 Nutrition/Amino


 Acids/Dextrose/


 Fat Emulsion


 Intravenous  1,400 ml @ 


 58.333 mls/


 hr  TPN  CONT  4/9/20 22:00


 4/10/20 21:59 DC 4/9/20 22:08


58.333 MLS/HR


 


 Sodium Chloride


 100 meq/Potassium


 Chloride 40 meq/


 Magnesium Sulfate


 15 meq/Calcium


 Gluconate 15 meq/


 Multivitamins 10


 ml/Chromium/


 Copper/Manganese/


 Seleni/Zn 0.5 ml/


 Insulin Human


 Regular 35 unit/


 Total Parenteral


 Nutrition/Amino


 Acids/Dextrose/


 Fat Emulsion


 Intravenous  1,400 ml @ 


 58.333 mls/


 hr  TPN  CONT  4/19/20 22:00


 4/20/20 21:59 DC 4/19/20 22:46


58.333 MLS/HR


 


 Sodium Chloride


 100 meq/Potassium


 Chloride 40 meq/


 Magnesium Sulfate


 20 meq/Calcium


 Gluconate 10 meq/


 Multivitamins 10


 ml/Chromium/


 Copper/Manganese/


 Seleni/Zn 0.5 ml/


 Insulin Human


 Regular 35 unit/


 Total Parenteral


 Nutrition/Amino


 Acids/Dextrose/


 Fat Emulsion


 Intravenous  1,400 ml @ 


 58.333 mls/


 hr  TPN  CONT  4/23/20 22:00


 4/24/20 21:59 DC 4/24/20 00:06


58.333 MLS/HR


 


 Sodium Chloride


 100 meq/Potassium


 Chloride 40 meq/


 Magnesium Sulfate


 20 meq/Calcium


 Gluconate 15 meq/


 Multivitamins 10


 ml/Chromium/


 Copper/Manganese/


 Seleni/Zn 0.5 ml/


 Insulin Human


 Regular 35 unit/


 Total Parenteral


 Nutrition/Amino


 Acids/Dextrose/


 Fat Emulsion


 Intravenous  1,400 ml @ 


 58.333 mls/


 hr  TPN  CONT  4/22/20 22:00


 4/23/20 21:59 DC 4/22/20 22:27


58.333 MLS/HR


 


 Sodium Chloride


 100 meq/Potassium


 Phosphate 10 mmol/


 Magnesium Sulfate


 12 meq/Calcium


 Gluconate 15 meq/


 Multivitamins 10


 ml/Chromium/


 Copper/Manganese/


 Seleni/Zn 0.5 ml/


 Insulin Human


 Regular 35 unit/


 Potassium


 Chloride 20 meq/


 Total Parenteral


 Nutrition/Amino


 Acids/Dextrose/


 Fat Emulsion


 Intravenous  1,400 ml @ 


 58.333 mls/


 hr  TPN  CONT  4/16/20 22:00


 4/17/20 21:59 DC 4/16/20 22:10


58.333 MLS/HR


 


 Sodium Chloride


 100 meq/Potassium


 Phosphate 19 mmol/


 Magnesium Sulfate


 12 meq/Calcium


 Gluconate 15 meq/


 Multivitamins 10


 ml/Chromium/


 Copper/Manganese/


 Seleni/Zn 0.5 ml/


 Insulin Human


 Regular 40 unit/


 Potassium


 Chloride 20 meq/


 Total Parenteral


 Nutrition/Amino


 Acids/Dextrose/


 Fat Emulsion


 Intravenous  1,400 ml @ 


 58.333 mls/


 hr  TPN  CONT  4/15/20 22:00


 4/16/20 21:59 DC 4/15/20 21:20


58.333 MLS/HR


 


 Sodium Chloride


 100 meq/Potassium


 Phosphate 5 mmol/


 Magnesium Sulfate


 12 meq/Calcium


 Gluconate 15 meq/


 Multivitamins 10


 ml/Chromium/


 Copper/Manganese/


 Seleni/Zn 0.5 ml/


 Insulin Human


 Regular 35 unit/


 Potassium


 Chloride 20 meq/


 Total Parenteral


 Nutrition/Amino


 Acids/Dextrose/


 Fat Emulsion


 Intravenous  1,400 ml @ 


 58.333 mls/


 hr  TPN  CONT  4/17/20 22:00


 4/18/20 21:59 DC 4/17/20 22:59


58.333 MLS/HR


 


 Succinylcholine


 Chloride


  (Anectine)  120 mg  1X  ONCE  3/23/20 08:30


 3/23/20 08:31 DC 3/23/20 08:34


120 MG


 


 Vecuronium Bromide


  (Norcuron Bolus)  6 mg  PRN Q6HRS  PRN  5/7/20 19:15


 5/7/20 19:35 DC  














Labs:


Lab





Laboratory Tests








Test


 5/26/20


17:52 5/26/20


23:28 5/27/20


06:07


 


Glucose (Fingerstick)


 182 mg/dL


(70-99) 161 mg/dL


(70-99) 142 mg/dL


(70-99)











Objective:


Assessment:


Fever intermittent could be from underlying pancreatitis vs  aspiration 

pneumonia


Acute pancreatitis with persistent  necrosis


CT a/p 4/9


    Increased ascites. Persistent evidence of necrotizing pancreatitis with 

fluid and phlegmon


   at the pancreas


   4/27 status post ROBERT drain placement; yeast


   5/6 fluid  candida parapsilosis fluid amylase high


Cholelithiasis with thickening of the gallbladder wall.


Leucocytosis 


JED,Hyperkalemia, Metabolic acidosis off dialysis


Acute hypoxic resp failure ,bilateral pleural effusion and atelectasis


hypocalcemia 


Prediabetes


HTN


s/p trach





Plan:


Plan of Care


Change Merrem to Zosyn


DC micafungin, 5/4


    Off dapto/zyvox/merrem


Aggressive pulmonary toilet


Maintain aspiration precaution


Supportive care





Critically ill











IVAN FRANZ MD           May 27, 2020 08:31

## 2020-05-27 NOTE — NUR
Chantel ESPAÑA inquired about pt being cleared to try honey thick liquids. Dr. Flores paged and 
said to clamp the NG tube for an hour or so and if pt tolerated NG tube clamped, then okay 
to try liquids. Chantel notified. After about an hour and a half, pt was complaining of 
nausea. PRN medication administered and NG re-hooked to suction.

## 2020-05-27 NOTE — PDOC
PROGRESS NOTES


Chief Complaint


Chief Complaint


Acute hypoxic Respiratory failure requiring mechanical ventilation (now 

extubated for several days but still with tracheostomy)


Tracheostomy


bilateral pleural effusions/pulm edema 


Sepsis


Severe Acute gallstone pancreatitis (not a surgical candidate at this time) with

necrosis


Acute kidney failure now requiring dialysis


Salpingitis


Gallstones (Calculus of gallbladder with acute cholecystitis without 

obstruction)


HTN 


Leukocytosis 


Hypoxia


Uterine fibroid


Intractable pain


Intractable nausea


Covid 19 negative. 


Acute on chronic anemia 


EEG: No seizure activityFever  - better currently - intermittent could be from 

underlying pancreatitis blood cults 5/4 - neg so far


? Ileus with vomiting


Abd distention - U/S and CT reviewed s/p 0.4 L of opaque, debris-containing 

ascites was removed 5/6


Acute pancreatitis with persistent necrosis


- 4/27 status post ROBERT drain placement + C paropsilosis. s/p additional drains 

5/8


Anemia - S/p PRBCs


Cholelithiasis with thickening of the gallbladder wall.


Leucocytosis improving


JED, hyperkalemia, Metabolic acidosis off dialysis


Acute hypoxic resp failure ,bilateral pleural effusion and atelectasis


hypocalcemia 


Prediabetes


HTN


s/p trach


ESRD on HD


Hyperglycemia





History of Present Illness


History of Present Illness


Seen bedside. Hb 8. She was just a bit hypoxic, had a mucous plug suctioned. 

Able to vocalize well with speaking valve, tells me we are being very hard on 

her and she would like to be drugged back to sleep.  She wants to wake up and 

feel better. On trach shield, 


T max 100.4. afebrile this a.m.





5/26/2020


Patient seen and examined in the ICU


She is up in the chair very frail trying to talk a little shaky


Discussed with RN


Chart reviewed








5/25/2020


Patient seen and examined in the ICU


Patient up in the chair


Having a severe coughing episode with a lot of phlegm coming out of her 

tracheostomy


Discussed with RN


Discussed with physical therapy


Chart reviewed











5/24,.    feels well,  has been out of room in wheelchair


no complaint,    still weak,   some with not wanting to wear her valve on trach


cont other,   may be able to work with speech tomorrow,    kaliwallow OK to 

try, 








5/23,  anxiety is up today,   she dislikes the valve still,    


shower and outside today


.   





5/22 she doesnt want to wear her passy-kristan valve,  discussed str and plan with 

her.


speech following,  needs swallow study,   but needs to wear her valve longer,  


cont current


able to walk some, walker 





5/21  stronger,   better,   


we discussed better oral care


she would like to try swallow study,  wants to try to eat,    speech is 

following








5/20/2020 


She remains in the ICU


sitting up and working with OT,   getting better


if limit pain meds, may do better off the vent, 





Nurses trying to suction her,  that is also improved, 


Chart reviewed


 








5/19/2020


Patient seen and examined in the ICU


She had an episode yesterday of tachycardia and severe agitation we gave her 

some Ativan


After that she seemed to have stroke symptoms but now that the Ativan has wore 

off her stroke symptoms have resolved


 


 


She is on IV meropenem and daptomycin and micafungin


Chart reviewed


Discussed with RN


Patient is still critically ill


 


BRIEF OPERATIVE NOTE


Pre-Op Diagnosis


Pancreatitis with pseudocysts, suspected infection


Post-Op Diagnosis


same


Procedure Performed


CT abdominal Drains x 3


Surgeon


Hardy


Anesthesia Type:  Conscious Sedation


Findings


3 abdominal drains, 14F, with turbid pancreatic fluid and necrotic debris in 

each.


Complications


No immediate








5/9: Patient today somewhat restless and having bilious secretions from ET tube,

imaging studies ordered, discussed with consultant. Pretty poor prognosis, 

hopefully is not a fistula, poor surgical candidate. 





5/10: Imaging with no acute events, she seems more stable today compared to 

yesterday. Encouraged as much activity as possible patient at high risk for 

severe depression.





Vitals


Vitals





Vital Signs








  Date Time  Temp Pulse Resp B/P (MAP) Pulse Ox O2 Delivery O2 Flow Rate FiO2


 


5/27/20 09:00  136 26 122/70 (87) 96 Room Air  


 


5/27/20 08:12 98.7       





 98.7       


 


5/27/20 04:00        











Physical Exam


Physical Exam


GENERAL: Just got up in the chair having a lot of coughing


HEENT:  Oral cavity clear,  NGT


NECK:  Trach shield


LUNGS: A lot of coughing with productive sputum from the tracheostomy site


HEART:  S1, S2, regular 


ABDOMEN: mod distention, hypoactive BS, tender, + drains x 3


: [place] (4/14)


EXTREMITIES: Generalized edema, improving, no cyanosis, SCDs bilaterally 


SKIN: Warm and dry.  No generalized rash.  


CNS: Very weak 


RUE-PICC (4/30) clean


General:  Alert


Heart:  Regular rate, Normal S1, Normal S2, No murmurs, Gallops


Lungs:  Other (Good air movement but having a lot of productive sputum from her 

tracheostomy site)


Abdomen:  Soft


Extremities:  No clubbing, No cyanosis, No edema, Normal pulses, No tendernes

s/swelling


Skin:  Other (warm, dry)





Labs


LABS





Laboratory Tests








Test


 5/26/20


17:52 5/26/20


23:28 5/27/20


06:07


 


Glucose (Fingerstick)


 182 mg/dL


(70-99) 161 mg/dL


(70-99) 142 mg/dL


(70-99)











Assessment and Plan


Assessmemt and Plan


Problems


Medical Problems:


(1) Acute pancreatitis


Status: Acute  





(2) Cholelithiasis


Status: Acute  











Comment


Review of Relevant


I have reviewed the following items josy (where applicable) has been applied.


Labs





Laboratory Tests








Test


 5/25/20


17:46 5/25/20


23:55 5/26/20


05:45 5/26/20


06:00


 


Glucose (Fingerstick)


 173 mg/dL


(70-99) 166 mg/dL


(70-99) 


 170 mg/dL


(70-99)


 


White Blood Count


 


 


 11.4 x10^3/uL


(4.0-11.0) 





 


Red Blood Count


 


 


 2.80 x10^6/uL


(3.50-5.40) 





 


Hemoglobin


 


 


 8.0 g/dL


(12.0-15.5) 





 


Hematocrit


 


 


 24.4 %


(36.0-47.0) 





 


Mean Corpuscular Volume   87 fL ()  


 


Mean Corpuscular Hemoglobin   28 pg (25-35)  


 


Mean Corpuscular Hemoglobin


Concent 


 


 33 g/dL


(31-37) 





 


Red Cell Distribution Width


 


 


 19.5 %


(11.5-14.5) 





 


Platelet Count


 


 


 325 x10^3/uL


(140-400) 





 


Neutrophils (%) (Auto)   72 % (31-73)  


 


Lymphocytes (%) (Auto)   17 % (24-48)  


 


Monocytes (%) (Auto)   9 % (0-9)  


 


Eosinophils (%) (Auto)   2 % (0-3)  


 


Basophils (%) (Auto)   0 % (0-3)  


 


Neutrophils # (Auto)


 


 


 8.2 x10^3/uL


(1.8-7.7) 





 


Lymphocytes # (Auto)


 


 


 2.0 x10^3/uL


(1.0-4.8) 





 


Monocytes # (Auto)


 


 


 1.0 x10^3/uL


(0.0-1.1) 





 


Eosinophils # (Auto)


 


 


 0.2 x10^3/uL


(0.0-0.7) 





 


Basophils # (Auto)


 


 


 0.0 x10^3/uL


(0.0-0.2) 





 


Sodium Level


 


 


 134 mmol/L


(136-145) 





 


Potassium Level


 


 


 4.6 mmol/L


(3.5-5.1) 





 


Chloride Level


 


 


 97 mmol/L


() 





 


Carbon Dioxide Level


 


 


 33 mmol/L


(21-32) 





 


Anion Gap   4 (6-14)  


 


Blood Urea Nitrogen


 


 


 28 mg/dL


(7-20) 





 


Creatinine


 


 


 0.9 mg/dL


(0.6-1.0) 





 


Estimated GFR


(Cockcroft-Gault) 


 


 66.5 


 





 


Glucose Level


 


 


 183 mg/dL


(70-99) 





 


Calcium Level


 


 


 10.0 mg/dL


(8.5-10.1) 





 


Test


 5/26/20


17:52 5/26/20


23:28 5/27/20


06:07 





 


Glucose (Fingerstick)


 182 mg/dL


(70-99) 161 mg/dL


(70-99) 142 mg/dL


(70-99) 











Laboratory Tests








Test


 5/26/20


17:52 5/26/20


23:28 5/27/20


06:07


 


Glucose (Fingerstick)


 182 mg/dL


(70-99) 161 mg/dL


(70-99) 142 mg/dL


(70-99)








Microbiology


5/17/20 Blood Culture - Final, Complete


          NO GROWTH AFTER 5 DAYS


5/6/20 Fungal Culture - Final, Complete


         


5/6/20 Fungal Culture Result 1 - Final, Complete


         


4/30/20 Aerobic Culture - Final, Complete


          


4/30/20 Aerobic Culture Result 1 (ANSON) - Final, Complete


          


4/30/20 Gram Stain - Final, Complete


          


4/30/20 Gram Stain Result 1 (ANSON) - Final, Complete


          


4/30/20 Gram Stain Result 2 (ANSON) - Final, Complete


          


4/12/20 Urine Culture - Final, Complete


          


4/12/20 Urine Culture Result 1 (ANSON) - Final, Complete


Medications





Current Medications


Sodium Chloride 1,000 ml @  1,000 mls/hr Q1H IV  Last administered on 3/16/20at 

03:00;  Start 3/16/20 at 03:00;  Stop 3/16/20 at 03:59;  Status DC


Ondansetron HCl (Zofran) 4 mg 1X  ONCE IVP  Last administered on 3/16/20at 

03:27;  Start 3/16/20 at 03:00;  Stop 3/16/20 at 03:01;  Status DC


Morphine Sulfate (Morphine Sulfate) 4 mg 1X  ONCE IV ;  Start 3/16/20 at 03:00; 

Stop 3/16/20 at 03:01;  Status Cancel


Ketorolac Tromethamine (Toradol 30mg Vial) 30 mg 1X  ONCE IV  Last administered 

on 3/16/20at 02:54;  Start 3/16/20 at 03:00;  Stop 3/16/20 at 03:01;  Status DC


Fentanyl Citrate (Fentanyl 2ml Vial) 25 mcg 1X  ONCE IVP  Last administered on 

3/16/20at 03:23;  Start 3/16/20 at 03:30;  Stop 3/16/20 at 03:31;  Status DC


Fentanyl Citrate (Fentanyl 2ml Vial) 100 mcg STK-MED ONCE .ROUTE ;  Start 

3/16/20 at 03:18;  Stop 3/16/20 at 03:18;  Status DC


Iohexol (Omnipaque 350 Mg/ml) 90 ml 1X  ONCE IV  Last administered on 3/16/20at 

03:25;  Start 3/16/20 at 03:30;  Stop 3/16/20 at 03:31;  Status DC


Info (CONTRAST GIVEN -- Rx MONITORING) 1 each PRN DAILY  PRN MC SEE COMMENTS;  

Start 3/16/20 at 03:30;  Stop 3/18/20 at 03:29;  Status DC


Hydromorphone HCl (Dilaudid) 0.5 mg 1X  ONCE IV  Last administered on 3/16/20at 

03:55;  Start 3/16/20 at 04:30;  Stop 3/16/20 at 04:32;  Status DC


Ondansetron HCl (Zofran) 4 mg PRN Q8HRS  PRN IV NAUSEA/VOMITING 1ST CHOICE;  

Start 3/16/20 at 05:00;  Stop 3/16/20 at 09:27;  Status DC


Morphine Sulfate (Morphine Sulfate) 2 mg PRN Q2HR  PRN IV SEVERE PAIN 7-10 Last 

administered on 3/17/20at 12:26;  Start 3/16/20 at 05:00;  Stop 3/17/20 at 

14:15;  Status DC


Sodium Chloride 1,000 ml @  125 mls/hr Q8H IV  Last administered on 3/16/20at 

20:56;  Start 3/16/20 at 05:00;  Stop 3/17/20 at 04:59;  Status DC


Hydromorphone HCl (Dilaudid) 0.5 mg PRN Q3HRS  PRN IV SEVERE PAIN 7-10 Last 

administered on 3/17/20at 10:06;  Start 3/16/20 at 05:00;  Stop 3/17/20 at 

12:01;  Status DC


Piperacillin Sod/ Tazobactam Sod 4.5 gm/Sodium Chloride 100 ml @  200 mls/hr 1X 

ONCE IV  Last administered on 3/16/20at 05:44;  Start 3/16/20 at 06:00;  Stop 

3/16/20 at 06:29;  Status DC


Ondansetron HCl (Zofran) 4 mg PRN Q4HRS  PRN IV NAUSEA/VOMITING 1ST CHOICE Last 

administered on 5/26/20at 09:51;  Start 3/16/20 at 09:30


Insulin Human Lispro (HumaLOG) 0-9 UNITS Q6HRS SQ  Last administered on 

5/26/20at 23:30;  Start 3/16/20 at 09:30


Dextrose (Dextrose 50%-Water Syringe) 12.5 gm PRN Q15MIN  PRN IV SEE COMMENTS;  

Start 3/16/20 at 09:30


Pantoprazole Sodium (PROTONIX VIAL for IV PUSH) 40 mg DAILYAC IVP  Last 

administered on 5/27/20at 08:16;  Start 3/16/20 at 11:30


Prochlorperazine Edisylate (Compazine) 10 mg PRN Q6HRS  PRN IV NAUSEA/VOMITING, 

2nd CHOICE Last administered on 5/26/20at 21:22;  Start 3/16/20 at 17:45


Atenolol (Tenormin) 100 mg DAILY PO ;  Start 3/17/20 at 09:00;  Stop 3/16/20 at 

20:08;  Status DC


Metoprolol Tartrate (Lopressor Vial) 2.5 mg Q6HRS IVP  Last administered on 

3/17/20at 05:51;  Start 3/16/20 at 20:15;  Stop 3/17/20 at 10:02;  Status DC


Metoprolol Tartrate (Lopressor Vial) 5 mg Q6HRS IVP  Last administered on 

3/26/20at 00:12;  Start 3/17/20 at 10:15;  Stop 3/28/20 at 08:48;  Status DC


Hydromorphone HCl (Dilaudid) 1 mg PRN Q3HRS  PRN IV SEVERE PAIN 7-10 Last 

administered on 3/23/20at 05:13;  Start 3/17/20 at 12:00;  Stop 3/31/20 at 

00:25;  Status DC


Lidocaine HCl (Buffered Lidocaine 1%) 3 ml STK-MED ONCE .ROUTE ;  Start 3/17/20 

at 12:55;  Stop 3/17/20 at 12:56;  Status DC


Albumin Human 500 ml @  125 mls/hr 1X  ONCE IV  Last administered on 3/17/20at 

14:33;  Start 3/17/20 at 14:30;  Stop 3/17/20 at 18:32;  Status DC


Norepinephrine Bitartrate 8 mg/ Dextrose 258 ml @  17.299 mls/ hr CONT  PRN IV 

PER PROTOCOL Last administered on 4/14/20at 12:48;  Start 3/17/20 at 15:30;  

Stop 4/17/20 at 09:19;  Status DC


Sodium Chloride 1,000 ml @  125 mls/hr Q8H IV  Last administered on 3/17/20at 

21:04;  Start 3/17/20 at 16:00;  Stop 3/18/20 at 02:42;  Status DC


Albumin Human 500 ml @  125 mls/hr PRN BID  PRN IV After every 2L NSS & BP < 

90mm Last administered on 4/1/20at 14:21;  Start 3/17/20 at 16:00


Iohexol (Omnipaque 300 Mg/ml) 60 ml 1X  ONCE IV  Last administered on 3/17/20at 

17:20;  Start 3/17/20 at 17:00;  Stop 3/17/20 at 17:01;  Status DC


Info (CONTRAST GIVEN -- Rx MONITORING) 1 each PRN DAILY  PRN MC SEE COMMENTS;  

Start 3/17/20 at 17:00;  Stop 3/19/20 at 16:59;  Status DC


Meropenem 1 gm/ Sodium Chloride 100 ml @  200 mls/hr Q8HRS IV  Last administered

on 3/18/20at 05:45;  Start 3/17/20 at 20:00;  Stop 3/18/20 at 08:48;  Status DC


Furosemide (Lasix) 40 mg 1X  ONCE IVP  Last administered on 3/17/20at 22:12;  

Start 3/17/20 at 22:30;  Stop 3/17/20 at 22:31;  Status DC


Calcium Chloride 1000 mg/Sodium Chloride 110 ml @  220 mls/hr 1X  ONCE IV  Last 

administered on 3/17/20at 22:11;  Start 3/17/20 at 22:30;  Stop 3/17/20 at 

22:59;  Status DC


Albuterol Sulfate (Ventolin Neb Soln) 2.5 mg 1X  ONCE NEB  Last administered on 

3/18/20at 00:56;  Start 3/17/20 at 22:30;  Stop 3/17/20 at 22:31;  Status DC


Insulin Human Regular (HumuLIN R VIAL) 5 unit 1X  ONCE IV  Last administered on 

3/17/20at 22:14;  Start 3/17/20 at 22:30;  Stop 3/17/20 at 22:31;  Status DC


Magnesium Sulfate 50 ml @ 25 mls/hr 1X  ONCE IV  Last administered on 3/18/20at 

02:57;  Start 3/18/20 at 03:00;  Stop 3/18/20 at 04:59;  Status DC


Calcium Gluconate 1000 mg/Sodium Chloride 110 ml @  220 mls/hr 1X  ONCE IV  Last

administered on 3/18/20at 02:46;  Start 3/18/20 at 03:00;  Stop 3/18/20 at 

03:29;  Status DC


Sodium Chloride 1,000 ml @  200 mls/hr Q5H IV  Last administered on 3/18/20at 

02:46;  Start 3/18/20 at 03:00;  Stop 3/18/20 at 10:21;  Status DC


Calcium Gluconate 1000 mg/Sodium Chloride 110 ml @  220 mls/hr 1X  ONCE IV  Last

administered on 3/18/20at 03:21;  Start 3/18/20 at 03:30;  Stop 3/18/20 at 

03:59;  Status DC


Sodium Bicarbonate 50 meq/Sodium Chloride 1,050 ml @  75 mls/hr Q14H IV  Last 

administered on 3/22/20at 21:10;  Start 3/18/20 at 07:30;  Stop 3/23/20 at 

10:28;  Status DC


Calcium Gluconate 2000 mg/Sodium Chloride 120 ml @  220 mls/hr 1X  ONCE IV  Last

administered on 3/18/20at 09:05;  Start 3/18/20 at 07:30;  Stop 3/18/20 at 

08:02;  Status DC


Lidocaine HCl (Xylocaine-Mpf 1% 2ml Vial) 2 ml STK-MED ONCE .ROUTE ;  Start 

3/18/20 at 08:47;  Stop 3/18/20 at 08:47;  Status DC


Meropenem 500 mg/ Sodium Chloride 50 ml @  100 mls/hr Q12HR IV  Last 

administered on 3/23/20at 21:01;  Start 3/18/20 at 18:00;  Stop 3/24/20 at 

07:58;  Status DC


Lidocaine HCl (Buffered Lidocaine 1%) 3 ml STK-MED ONCE .ROUTE ;  Start 3/18/20 

at 09:46;  Stop 3/18/20 at 09:46;  Status DC


Lidocaine HCl (Buffered Lidocaine 1%) 6 ml 1X  ONCE INJ  Last administered on 

3/18/20at 10:26;  Start 3/18/20 at 10:15;  Stop 3/18/20 at 10:16;  Status DC


Info (Tpn Per Pharmacy) 1 each PRN DAILY  PRN MC SEE COMMENTS Last administered 

on 5/26/20at 14:27;  Start 3/18/20 at 12:00


Sodium Chloride 1,000 ml @  1,000 mls/hr Q1H PRN IV hypotension;  Start 3/18/20 

at 12:07;  Stop 3/18/20 at 18:06;  Status DC


Diphenhydramine HCl (Benadryl) 25 mg 1X PRN  PRN IV ITCHING;  Start 3/18/20 at 

12:15;  Stop 3/19/20 at 12:14;  Status DC


Diphenhydramine HCl (Benadryl) 25 mg 1X PRN  PRN IV ITCHING;  Start 3/18/20 at 

12:15;  Stop 3/19/20 at 12:14;  Status DC


Sodium Chloride 1,000 ml @  400 mls/hr Q2H30M PRN IV PATENCY;  Start 3/18/20 at 

12:07;  Stop 3/19/20 at 00:06;  Status DC


Info (PHARMACY MONITORING -- do not chart) 1 each PRN DAILY  PRN MC SEE COMM

ENTS;  Start 3/18/20 at 12:15;  Stop 3/20/20 at 08:13;  Status DC


Sodium Chloride 90 meq/Calcium Gluconate 10 meq/ Multivitamins 10 ml/Chromium/ 

Copper/Manganese/ Seleni/Zn 1 ml/ Total Parenteral Nutrition/Amino 

Acids/Dextrose/ Fat Emulsion Intravenous 55.005 ml  @ 2.292 mls/hr TPN  CONT IV 

;  Start 3/18/20 at 22:00;  Stop 3/18/20 at 12:33;  Status DC


Info (Tpn Per Pharmacy) 1 each PRN DAILY  PRN MC SEE COMMENTS;  Start 3/18/20 at

12:30;  Status UNV


Sodium Chloride 90 meq/Calcium Gluconate 10 meq/ Multivitamins 10 ml/Chromium/ 

Copper/Manganese/ Seleni/Zn 0.5 ml/ Total Parenteral Nutrition/Amino 

Acids/Dextrose/ Fat Emulsion Intravenous 1,512 ml @  63 mls/hr TPN  CONT IV  

Last administered on 3/18/20at 22:06;  Start 3/18/20 at 22:00;  Stop 3/19/20 at 

21:59;  Status DC


Calcium Carbonate/ Glycine (Tums) 500 mg PRN AFTMEALHC  PRN PO INDIGESTION;  

Start 3/18/20 at 17:45;  Stop 5/13/20 at 10:25;  Status DC


Calcium Gluconate (Calcium Gluconate) 2,000 mg 1X  ONCE IVP  Last administered 

on 3/19/20at 02:19;  Start 3/19/20 at 02:15;  Stop 3/19/20 at 02:16;  Status DC


Calcium Chloride 3000 mg/Sodium Chloride 1,030 ml @  50 mls/hr [id number] IV  Last 

administered on 3/21/20at 02:17;  Start 3/19/20 at 08:00;  Stop 3/21/20 at 

15:23;  Status DC


Lorazepam (Ativan Inj) 1 mg PRN Q4HRS  PRN IVP ANXIETY / AGITATION, 2nd choic 

Last administered on 4/17/20at 03:51;  Start 3/19/20 at 09:00;  Stop 4/17/20 at 

09:19;  Status DC


Sodium Chloride 1,000 ml @  1,000 mls/hr Q1H PRN IV hypotension;  Start 3/19/20 

at 08:56;  Stop 3/19/20 at 14:55;  Status DC


Albumin Human 200 ml @  200 mls/hr 1X PRN  PRN IV Hypotension;  Start 3/19/20 at

09:00;  Stop 3/19/20 at 14:59;  Status DC


Diphenhydramine HCl (Benadryl) 25 mg 1X PRN  PRN IV ITCHING;  Start 3/19/20 at 

09:00;  Stop 3/20/20 at 08:59;  Status DC


Diphenhydramine HCl (Benadryl) 25 mg 1X PRN  PRN IV ITCHING;  Start 3/19/20 at 

09:00;  Stop 3/20/20 at 08:59;  Status DC


Sodium Chloride 1,000 ml @  400 mls/hr Q2H30M PRN IV PATENCY;  Start 3/19/20 at 

08:56;  Stop 3/19/20 at 20:55;  Status DC


Info (PHARMACY MONITORING -- do not chart) 1 each PRN DAILY  PRN MC SEE 

COMMENTS;  Start 3/19/20 at 09:00;  Status UNV


Info (PHARMACY MONITORING -- do not chart) 1 each PRN DAILY  PRN MC SEE 

COMMENTS;  Start 3/19/20 at 09:00;  Stop 3/20/20 at 08:13;  Status DC


Digoxin (Lanoxin) 500 mcg 1X  ONCE IV  Last administered on 3/19/20at 10:04;  

Start 3/19/20 at 10:00;  Stop 3/19/20 at 10:01;  Status DC


Digoxin (Lanoxin) 125 mcg 1X  ONCE IV  Last administered on 3/19/20at 17:10;  

Start 3/19/20 at 18:00;  Stop 3/19/20 at 18:01;  Status DC


Magnesium Sulfate 100 ml @  25 mls/hr 1X  ONCE IV  Last administered on 

3/19/20at 12:48;  Start 3/19/20 at 13:00;  Stop 3/19/20 at 16:59;  Status DC


Sodium Chloride 90 meq/Magnesium Sulfate 10 meq/ Calcium Gluconate 20 meq/ 

Multivitamins 10 ml/Chromium/ Copper/Manganese/ Seleni/Zn 0.5 ml/ Total 

Parenteral Nutrition/Amino Acids/Dextrose/ Fat Emulsion Intravenous 1,512 ml @  

63 mls/hr TPN  CONT IV  Last administered on 3/19/20at 22:25;  Start 3/19/20 at 

22:00;  Stop 3/20/20 at 21:59;  Status DC


Sodium Chloride 1,000 ml @  1,000 mls/hr Q1H PRN IV hypotension;  Start 3/20/20 

at 08:05;  Stop 3/20/20 at 14:04;  Status DC


Albumin Human 200 ml @  200 mls/hr 1X  ONCE IV  Last administered on 3/20/20at 

08:57;  Start 3/20/20 at 08:15;  Stop 3/20/20 at 09:14;  Status DC


Diphenhydramine HCl (Benadryl) 25 mg 1X PRN  PRN IV ITCHING;  Start 3/20/20 at 

08:15;  Stop 3/21/20 at 08:14;  Status DC


Diphenhydramine HCl (Benadryl) 25 mg 1X PRN  PRN IV ITCHING;  Start 3/20/20 at 

08:15;  Stop 3/21/20 at 08:14;  Status DC


Sodium Chloride 1,000 ml @  400 mls/hr Q2H30M PRN IV PATENCY;  Start 3/20/20 at 

08:05;  Stop 3/20/20 at 20:04;  Status DC


Info (PHARMACY MONITORING -- do not chart) 1 each PRN DAILY  PRN MC SEE 

COMMENTS;  Start 3/20/20 at 08:15;  Stop 3/24/20 at 07:57;  Status DC


Sodium Chloride 90 meq/Potassium Chloride 15 meq/ Potassium Phosphate 10 mmol/ 

Magnesium Sulfate 10 meq/Calcium Gluconate 20 meq/ Multivitamins 10 ml/Chromium/

Copper/Manganese/ Seleni/Zn 0.5 ml/ Total Parenteral Nutrition/Amino 

Acids/Dextrose/ Fat Emulsion Intravenous 1,512 ml @  63 mls/hr TPN  CONT IV  

Last administered on 3/20/20at 21:01;  Start 3/20/20 at 22:00;  Stop 3/21/20 at 

21:59;  Status DC


Potassium Chloride/Water 100 ml @  100 mls/hr 1X  ONCE IV  Last administered on 

3/20/20at 14:09;  Start 3/20/20 at 14:00;  Stop 3/20/20 at 14:59;  Status DC


Benzocaine (Hurricaine One) 1 spray 1X  ONCE MM  Last administered on 3/20/20at 

16:38;  Start 3/20/20 at 14:30;  Stop 3/20/20 at 14:31;  Status DC


Lidocaine HCl (Glydo (Lidocaine) Jelly) 1 thomas 1X  ONCE MM  Last administered on 

3/20/20at 16:38;  Start 3/20/20 at 14:30;  Stop 3/20/20 at 14:31;  Status DC


Linezolid/Dextrose 300 ml @  300 mls/hr Q12HR IV  Last administered on 3/26/20at

21:04;  Start 3/20/20 at 20:00;  Stop 3/27/20 at 07:50;  Status DC


Acetaminophen (Tylenol) 650 mg PRN Q6HRS  PRN PO MILD PAIN / TEMP;  Start 

3/21/20 at 03:30;  Stop 3/21/20 at 03:36;  Status DC


Acetaminophen (Tylenol) 650 mg PRN Q6HRS  PRN PEG MILD PAIN / TEMP Last 

administered on 4/16/20at 19:56;  Start 3/21/20 at 03:36;  Stop 5/13/20 at 

10:25;  Status DC


Sodium Chloride 1,000 ml @  1,000 mls/hr Q1H PRN IV hypotension;  Start 3/21/20 

at 07:50;  Stop 3/21/20 at 13:49;  Status DC


Albumin Human 200 ml @  200 mls/hr 1X PRN  PRN IV Hypotension;  Start 3/21/20 at

08:00;  Stop 3/21/20 at 13:59;  Status DC


Sodium Chloride (Normal Saline Flush) 10 ml 1X PRN  PRN IV AP catheter pack;  

Start 3/21/20 at 08:00;  Stop 3/22/20 at 07:59;  Status DC


Sodium Chloride (Normal Saline Flush) 10 ml 1X PRN  PRN IV  catheter pack;  

Start 3/21/20 at 08:00;  Stop 3/22/20 at 07:59;  Status DC


Sodium Chloride 1,000 ml @  400 mls/hr Q2H30M PRN IV PATENCY;  Start 3/21/20 at 

07:50;  Stop 3/21/20 at 19:49;  Status DC


Info (PHARMACY MONITORING -- do not chart) 1 each PRN DAILY  PRN MC SEE 

COMMENTS;  Start 3/21/20 at 08:00;  Status UNV


Info (PHARMACY MONITORING -- do not chart) 1 each PRN DAILY  PRN MC SEE 

COMMENTS;  Start 3/21/20 at 08:00;  Stop 3/23/20 at 08:25;  Status DC


Sodium Chloride 90 meq/Potassium Chloride 15 meq/ Potassium Phosphate 10 mmol/ 

Magnesium Sulfate 10 meq/Calcium Gluconate 20 meq/ Multivitamins 10 ml/Chromium/

Copper/Manganese/ Seleni/Zn 0.5 ml/ Total Parenteral Nutrition/Amino 

Acids/Dextrose/ Fat Emulsion Intravenous 1,512 ml @  63 mls/hr TPN  CONT IV  

Last administered on 3/21/20at 20:57;  Start 3/21/20 at 22:00;  Stop 3/22/20 at 

21:59;  Status DC


Sodium Chloride 90 meq/Potassium Chloride 15 meq/ Potassium Phosphate 15 mmol/ 

Magnesium Sulfate 10 meq/Calcium Gluconate 20 meq/ Multivitamins 10 ml/Chromium/

Copper/Manganese/ Seleni/Zn 0.5 ml/ Total Parenteral Nutrition/Amino 

Acids/Dextrose/ Fat Emulsion Intravenous 1,512 ml @  63 mls/hr TPN  CONT IV ;  

Start 3/22/20 at 22:00;  Stop 3/22/20 at 14:16;  Status DC


Sodium Chloride 90 meq/Potassium Chloride 15 meq/ Potassium Phosphate 15 mmol/ 

Magnesium Sulfate 10 meq/Calcium Gluconate 20 meq/ Multivitamins 10 ml/Chromium/

Copper/Manganese/ Seleni/Zn 0.5 ml/ Total Parenteral Nutrition/Amino 

Acids/Dextrose/ Fat Emulsion Intravenous 1,200 ml @  50 mls/hr TPN  CONT IV ;  

Start 3/22/20 at 22:00;  Stop 3/22/20 at 14:17;  Status DC


Sodium Chloride 90 meq/Potassium Chloride 15 meq/ Potassium Phosphate 10 mmol/ 

Magnesium Sulfate 10 meq/Calcium Gluconate 20 meq/ Multivitamins 10 ml/Chromium/

Copper/Manganese/ Seleni/Zn 0.5 ml/ Total Parenteral Nutrition/Amino Acid

s/Dextrose/ Fat Emulsion Intravenous 1,200 ml @  50 mls/hr TPN  CONT IV  Last 

administered on 3/22/20at 23:29;  Start 3/22/20 at 22:00;  Stop 3/23/20 at 

21:59;  Status DC


Sodium Chloride 1,000 ml @  1,000 mls/hr Q1H PRN IV hypotension;  Start 3/23/20 

at 07:28;  Stop 3/23/20 at 13:27;  Status DC


Albumin Human 200 ml @  200 mls/hr 1X  ONCE IV  Last administered on 3/23/20at 

08:51;  Start 3/23/20 at 07:30;  Stop 3/23/20 at 08:29;  Status DC


Diphenhydramine HCl (Benadryl) 25 mg 1X PRN  PRN IV ITCHING;  Start 3/23/20 at 

07:30;  Stop 3/24/20 at 07:29;  Status DC


Diphenhydramine HCl (Benadryl) 25 mg 1X PRN  PRN IV ITCHING;  Start 3/23/20 at 

07:30;  Stop 3/24/20 at 07:29;  Status DC


Sodium Chloride 1,000 ml @  400 mls/hr Q2H30M PRN IV PATENCY;  Start 3/23/20 at 

07:28;  Stop 3/23/20 at 19:27;  Status DC


Info (PHARMACY MONITORING -- do not chart) 1 each PRN DAILY  PRN MC SEE 

COMMENTS;  Start 3/23/20 at 07:30;  Stop 4/3/20 at 13:01;  Status DC


Metronidazole 100 ml @  100 mls/hr Q6HRS IV  Last administered on 4/8/20at 06

:26;  Start 3/23/20 at 08:30;  Stop 4/8/20 at 09:58;  Status DC


Micafungin Sodium 100 mg/Dextrose 100 ml @  100 mls/hr Q24H IV  Last 

administered on 4/30/20at 08:18;  Start 3/23/20 at 09:00;  Stop 4/30/20 at 

20:58;  Status DC


Propofol 0 ml @ As Directed STK-MED ONCE IV ;  Start 3/23/20 at 07:53;  Stop 

3/23/20 at 07:53;  Status DC


Etomidate (Amidate) 20 mg STK-MED ONCE IV ;  Start 3/23/20 at 07:53;  Stop 

3/23/20 at 07:54;  Status DC


Midazolam HCl (Versed) 5 mg STK-MED ONCE .ROUTE ;  Start 3/23/20 at 07:57;  Stop

3/23/20 at 07:57;  Status DC


Fentanyl Citrate 30 ml @ 0 mls/hr CONT  PRN IV SEE PROTOCOL Last administered on

4/17/20at 06:12;  Start 3/23/20 at 08:15;  Stop 4/17/20 at 09:19;  Status DC


Artificial Tears (Artificial Tears) 1 drop PRN Q1HR  PRN OU DRY EYE, 1st choice;

 Start 3/23/20 at 08:15;  Stop 4/29/20 at 05:31;  Status DC


Midazolam HCl 50 mg/Sodium Chloride 50 ml @ 0 mls/hr CONT  PRN IV SEE PROTOCOL 

Last administered on 3/26/20at 22:39;  Start 3/23/20 at 08:15;  Stop 3/28/20 at 

15:59;  Status DC


Etomidate (Amidate) 8 mg 1X  ONCE IV  Last administered on 3/23/20at 08:33;  

Start 3/23/20 at 08:30;  Stop 3/23/20 at 08:31;  Status DC


Succinylcholine Chloride (Anectine) 120 mg 1X  ONCE IV  Last administered on 

3/23/20at 08:34;  Start 3/23/20 at 08:30;  Stop 3/23/20 at 08:31;  Status DC


Midazolam HCl (Versed) 5 mg 1X  ONCE IV ;  Start 3/23/20 at 08:30;  Stop 3/23/20

at 08:31;  Status DC


Potassium Chloride 15 meq/ Bicarbonate Dialysis Soln w/ out KCl 5,007.5 ml  @ 

1,000 mls/ hr Q5H1M IV  Last administered on 3/24/20at 11:11;  Start 3/23/20 at 

12:00;  Stop 3/24/20 at 11:15;  Status DC


Potassium Chloride 15 meq/ Bicarbonate Dialysis Soln w/ out KCl 5,007.5 ml  @ 

1,000 mls/ hr Q5H1M IV  Last administered on 3/24/20at 11:12;  Start 3/23/20 at 

12:00;  Stop 3/24/20 at 11:17;  Status DC


Potassium Chloride 15 meq/ Bicarbonate Dialysis Soln w/ out KCl 5,007.5 ml  @ 

1,000 mls/ hr Q5H1M IV  Last administered on 3/24/20at 11:11;  Start 3/23/20 at 

12:00;  Stop 3/24/20 at 11:19;  Status DC


Sodium Chloride 90 meq/Potassium Chloride 15 meq/ Potassium Phosphate 10 mmol/ 

Magnesium Sulfate 10 meq/Calcium Gluconate 20 meq/ Multivitamins 10 ml/Chromium/

Copper/Manganese/ Seleni/Zn 0.5 ml/ Total Parenteral Nutrition/Amino 

Acids/Dextrose/ Fat Emulsion Intravenous 1,400 ml @  58.333 mls/ hr TPN  CONT IV

 Last administered on 3/23/20at 21:42;  Start 3/23/20 at 22:00;  Stop 3/24/20 at

21:59;  Status DC


Heparin Sodium (Porcine) (Heparin Sodium) 5,000 unit Q8HRS SQ  Last administered

on 3/28/20at 05:55;  Start 3/23/20 at 15:00;  Stop 3/28/20 at 13:28;  Status DC


Meropenem 500 mg/ Sodium Chloride 50 ml @  100 mls/hr Q6HRS IV  Last 

administered on 3/25/20at 06:00;  Start 3/24/20 at 09:00;  Stop 3/25/20 at 

07:29;  Status DC


Potassium Phosphate 20 mmol/ Sodium Chloride 106.6667 ml @  51.667 m... 1X  ONCE

IV  Last administered on 3/24/20at 11:22;  Start 3/24/20 at 10:15;  Stop 3/24/20

at 12:18;  Status DC


Acetaminophen (Tylenol Supp) 650 mg PRN Q6HRS  PRN OK MILD PAIN / TEMP > 100.3'F

Last administered on 5/5/20at 09:12;  Start 3/24/20 at 10:30


Potassium Chloride/Water 100 ml @  100 mls/hr Q1H IV  Last administered on 

3/24/20at 12:12;  Start 3/24/20 at 11:00;  Stop 3/24/20 at 12:59;  Status DC


Potassium Chloride 20 meq/ Bicarbonate Dialysis Soln w/ out KCl 5,010 ml @  

1,000 mls/hr Q5H1M IV  Last administered on 3/25/20at 08:48;  Start 3/24/20 at 

12:00;  Stop 3/25/20 at 13:03;  Status DC


Potassium Chloride 20 meq/ Bicarbonate Dialysis Soln w/ out KCl 5,010 ml @  

1,000 mls/hr Q5H1M IV  Last administered on 3/29/20at 14:52;  Start 3/24/20 at 

11:30;  Stop 3/29/20 at 19:59;  Status DC


Potassium Chloride 20 meq/ Bicarbonate Dialysis Soln w/ out KCl 5,010 ml @  

1,000 mls/hr Q5H1M IV  Last administered on 3/29/20at 14:53;  Start 3/24/20 at 

11:30;  Stop 3/29/20 at 19:59;  Status DC


Sodium Chloride 90 meq/Potassium Chloride 15 meq/ Potassium Phosphate 15 mmol/ 

Magnesium Sulfate 10 meq/Calcium Gluconate 15 meq/ Multivitamins 10 ml/Chromium/

Copper/Manganese/ Seleni/Zn 0.5 ml/ Total Parenteral Nutrition/Amino 

Acids/Dextrose/ Fat Emulsion Intravenous 1,400 ml @  58.333 mls/ hr TPN  CONT IV

 Last administered on 3/24/20at 22:17;  Start 3/24/20 at 22:00;  Stop 3/25/20 at

21:59;  Status DC


Cefepime HCl (Maxipime) 2 gm Q12HR IVP  Last administered on 4/7/20at 20:56;  

Start 3/25/20 at 09:00;  Stop 4/8/20 at 09:58;  Status DC


Daptomycin 500 mg/ Sodium Chloride 50 ml @  100 mls/hr Q48H IV  Last 

administered on 4/10/20at 09:57;  Start 3/25/20 at 08:30;  Stop 4/10/20 at 

10:07;  Status DC


Lidocaine HCl (Buffered Lidocaine 1%) 3 ml 1X  ONCE INJ  Last administered on 

3/25/20at 10:27;  Start 3/25/20 at 10:30;  Stop 3/25/20 at 10:31;  Status DC


Potassium Phosphate 20 mmol/ Sodium Chloride 106.6667 ml @  51.667 m... 1X  ONCE

IV  Last administered on 3/25/20at 12:51;  Start 3/25/20 at 13:00;  Stop 3/25/20

at 15:03;  Status DC


Sodium Chloride 90 meq/Potassium Chloride 15 meq/ Potassium Phosphate 18 mmol/ 

Magnesium Sulfate 8 meq/Calcium Gluconate 15 meq/ Multivitamins 10 ml/Chromium/ 

Copper/Manganese/ Seleni/Zn 0.5 ml/ Total Parenteral Nutrition/Amino 

Acids/Dextrose/ Fat Emulsion Intravenous 1,400 ml @  58.333 mls/ hr TPN  CONT IV

 Last administered on 3/25/20at 22:16;  Start 3/25/20 at 22:00;  Stop 3/26/20 at

21:59;  Status DC


Potassium Chloride 20 meq/ Bicarbonate Dialysis Soln w/ out KCl 5,010 ml @  

1,000 mls/hr Q5H1M IV  Last administered on 3/29/20at 14:54;  Start 3/25/20 at 

16:00;  Stop 3/29/20 at 19:59;  Status DC


Multi-Ingred Cream/Lotion/Oil/ Oint (Artificial Tears Eye Ointment) 1 thomas PRN 

Q1HR  PRN OU DRY EYE, 2nd choice Last administered on 4/13/20at 08:19;  Start 

3/25/20 at 17:30


Sodium Chloride 90 meq/Potassium Chloride 15 meq/ Potassium Phosphate 18 mmol/ 

Magnesium Sulfate 8 meq/Calcium Gluconate 15 meq/ Multivitamins 10 ml/Chromium/ 

Copper/Manganese/ Seleni/Zn 0.5 ml/ Total Parenteral Nutrition/Amino 

Acids/Dextrose/ Fat Emulsion Intravenous 1,400 ml @  58.333 mls/ hr TPN  CONT IV

 Last administered on 3/26/20at 22:00;  Start 3/26/20 at 22:00;  Stop 3/27/20 at

21:59;  Status DC


Albumin Human 500 ml @  125 mls/hr 1X  ONCE IV ;  Start 3/26/20 at 14:15;  Stop 

3/26/20 at 18:14;  Status DC


Sodium Chloride 90 meq/Potassium Chloride 15 meq/ Potassium Phosphate 18 mmol/ 

Magnesium Sulfate 8 meq/Calcium Gluconate 15 meq/ Multivitamins 10 ml/Chromium/ 

Copper/Manganese/ Seleni/Zn 0.5 ml/ Insulin Human Regular 10 unit/ Total 

Parenteral Nutrition/Amino Acids/Dextrose/ Fat Emulsion Intravenous 1,400 ml @  

58.333 mls/ hr TPN  CONT IV  Last administered on 3/27/20at 21:43;  Start 

3/27/20 at 22:00;  Stop 3/28/20 at 21:59;  Status DC


Lidocaine HCl (Buffered Lidocaine 1%) 3 ml STK-MED ONCE .ROUTE ;  Start 3/25/20 

at 10:00;  Stop 3/27/20 at 13:57;  Status DC


Midazolam HCl 100 mg/Sodium Chloride 100 ml @ 7 mls/hr CONT  PRN IV SEE PROTOCOL

Last administered on 4/8/20at 15:35;  Start 3/28/20 at 16:00


Sodium Chloride 90 meq/Potassium Chloride 15 meq/ Potassium Phosphate 18 mmol/ 

Magnesium Sulfate 8 meq/Calcium Gluconate 15 meq/ Multivitamins 10 ml/Chromium/ 

Copper/Manganese/ Seleni/Zn 0.5 ml/ Insulin Human Regular 15 unit/ Total 

Parenteral Nutrition/Amino Acids/Dextrose/ Fat Emulsion Intravenous 1,400 ml @  

58.333 mls/ hr TPN  CONT IV  Last administered on 3/28/20at 20:34;  Start 

3/28/20 at 22:00;  Stop 3/29/20 at 21:59;  Status DC


Info ([facility] Electrolyte Protocol) 1 ea CONT PRN  PRN MC PER PROTOCOL;  Start 

3/29/20 at 13:15


Sodium Chloride 90 meq/Potassium Chloride 15 meq/ Potassium Phosphate 18 mmol/ 

Magnesium Sulfate 8 meq/Calcium Gluconate 15 meq/ Multivitamins 10 ml/Chromium/ 

Copper/Manganese/ Seleni/Zn 0.5 ml/ Insulin Human Regular 15 unit/ Total 

Parenteral Nutrition/Amino Acids/Dextrose/ Fat Emulsion Intravenous 1,400 ml @  

58.333 mls/ hr TPN  CONT IV  Last administered on 3/29/20at 22:05;  Start 

3/29/20 at 22:00;  Stop 3/30/20 at 21:59;  Status DC


Potassium Chloride 15 meq/ Bicarbonate Dialysis Soln w/ out KCl 5,007.5 ml  @ 

1,000 mls/ hr Q5H1M IV  Last administered on 4/1/20at 18:14;  Start 3/29/20 at 

20:00;  Stop 4/2/20 at 13:08;  Status DC


Potassium Chloride 15 meq/ Bicarbonate Dialysis Soln w/ out KCl 5,007.5 ml  @ 

1,000 mls/ hr Q5H1M IV  Last administered on 4/1/20at 18:14;  Start 3/29/20 at 

20:00;  Stop 4/2/20 at 13:08;  Status DC


Potassium Chloride 15 meq/ Bicarbonate Dialysis Soln w/ out KCl 5,007.5 ml  @ 

1,000 mls/ hr Q5H1M IV  Last administered on 4/1/20at 18:14;  Start 3/29/20 at 

20:00;  Stop 4/2/20 at 13:08;  Status DC


Iohexol (Omnipaque 240 Mg/ml) 30 ml 1X  ONCE PO  Last administered on 3/30/20at 

11:30;  Start 3/30/20 at 11:30;  Stop 3/30/20 at 11:33;  Status DC


Info (CONTRAST GIVEN -- Rx MONITORING) 1 each PRN DAILY  PRN MC SEE COMMENTS;  

Start 3/30/20 at 11:45;  Stop 4/1/20 at 11:44;  Status DC


Sodium Chloride 90 meq/Potassium Chloride 15 meq/ Potassium Phosphate 18 mmol/ 

Magnesium Sulfate 8 meq/Calcium Gluconate 15 meq/ Multivitamins 10 ml/Chromium/ 

Copper/Manganese/ Seleni/Zn 0.5 ml/ Insulin Human Regular 15 unit/ Total 

Parenteral Nutrition/Amino Acids/Dextrose/ Fat Emulsion Intravenous 1,400 ml @  

58.333 mls/ hr TPN  CONT IV  Last administered on 3/30/20at 21:47;  Start 

3/30/20 at 22:00;  Stop 3/31/20 at 21:59;  Status DC


Sodium Chloride 90 meq/Potassium Chloride 15 meq/ Potassium Phosphate 18 mmol/ 

Magnesium Sulfate 8 meq/Calcium Gluconate 15 meq/ Multivitamins 10 ml/Chromium/ 

Copper/Manganese/ Seleni/Zn 0.5 ml/ Insulin Human Regular 20 unit/ Total 

Parenteral Nutrition/Amino Acids/Dextrose/ Fat Emulsion Intravenous 1,400 ml @  

58.333 mls/ hr TPN  CONT IV  Last administered on 3/31/20at 21:36;  Start 

3/31/20 at 22:00;  Stop 4/1/20 at 21:59;  Status DC


Alteplase, Recombinant (Cathflo For Central Catheter Clearance) 1 mg 1X  ONCE 

INT CAT  Last administered on 3/31/20at 20:03;  Start 3/31/20 at 19:30;  Stop 

3/31/20 at 19:46;  Status DC


Alteplase, Recombinant (Cathflo For Central Catheter Clearance) 1 mg 1X  ONCE 

INT CAT  Last administered on 3/31/20at 22:05;  Start 3/31/20 at 22:00;  Stop 

3/31/20 at 22:01;  Status DC


Sodium Chloride 90 meq/Potassium Chloride 15 meq/ Potassium Phosphate 18 mmol/ 

Magnesium Sulfate 8 meq/Calcium Gluconate 15 meq/ Multivitamins 10 ml/Chromium/ 

Copper/Manganese/ Seleni/Zn 0.5 ml/ Insulin Human Regular 20 unit/ Total 

Parenteral Nutrition/Amino Acids/Dextrose/ Fat Emulsion Intravenous 1,400 ml @  

58.333 mls/ hr TPN  CONT IV  Last administered on 4/1/20at 21:30;  Start 4/1/20 

at 22:00;  Stop 4/2/20 at 21:59;  Status DC


Dexmedetomidine HCl 400 mcg/ Sodium Chloride 100 ml @ 0 mls/hr CONT  PRN IV 

ANXIETY / AGITATION Last administered on 5/26/20at 15:16;  Start 4/2/20 at 08:15


Sodium Chloride 500 ml @  500 mls/hr 1X PRN  PRN IV ELEVATED BP, SEE COMMENTS;  

Start 4/2/20 at 08:15


Atropine Sulfate (ATROPINE 0.5mg SYRINGE) 0.5 mg PRN Q5MIN  PRN IV SEE COMMENTS;

 Start 4/2/20 at 08:15


Furosemide (Lasix) 20 mg 1X  ONCE IVP  Last administered on 4/2/20at 08:19;  

Start 4/2/20 at 08:15;  Stop 4/2/20 at 08:16;  Status DC


Lidocaine HCl (Buffered Lidocaine 1%) 3 ml STK-MED ONCE .ROUTE ;  Start 4/2/20 

at 08:39;  Stop 4/2/20 at 08:39;  Status DC


Lidocaine HCl (Buffered Lidocaine 1%) 6 ml 1X  ONCE INJ  Last administered on 

4/2/20at 09:05;  Start 4/2/20 at 09:00;  Stop 4/2/20 at 09:06;  Status DC


Sodium Chloride 90 meq/Potassium Chloride 15 meq/ Potassium Phosphate 18 mmol/ 

Magnesium Sulfate 8 meq/Calcium Gluconate 15 meq/ Multivitamins 10 ml/Chromium/ 

Copper/Manganese/ Seleni/Zn 0.5 ml/ Insulin Human Regular 20 unit/ Total 

Parenteral Nutrition/Amino Acids/Dextrose/ Fat Emulsion Intravenous 1,400 ml @  

58.333 mls/ hr TPN  CONT IV  Last administered on 4/2/20at 22:45;  Start 4/2/20 

at 22:00;  Stop 4/3/20 at 21:59;  Status DC


Sodium Chloride 1,000 ml @  1,000 mls/hr Q1H PRN IV hypotension;  Start 4/3/20 

at 07:30;  Stop 4/3/20 at 13:29;  Status DC


Albumin Human 200 ml @  200 mls/hr 1X PRN  PRN IV Hypotension Last administered 

on 4/3/20at 09:36;  Start 4/3/20 at 07:30;  Stop 4/3/20 at 13:29;  Status DC


Sodium Chloride (Normal Saline Flush) 10 ml 1X PRN  PRN IV AP catheter pack;  

Start 4/3/20 at 07:30;  Stop 4/3/20 at 21:29;  Status DC


Sodium Chloride (Normal Saline Flush) 10 ml 1X PRN  PRN IV  catheter pack;  

Start 4/3/20 at 07:30;  Stop 4/4/20 at 07:29;  Status DC


Sodium Chloride 1,000 ml @  400 mls/hr Q2H30M PRN IV PATENCY;  Start 4/3/20 at 

07:30;  Stop 4/3/20 at 19:29;  Status DC


Info (PHARMACY MONITORING -- do not chart) 1 each PRN DAILY  PRN MC SEE 

COMMENTS;  Start 4/3/20 at 07:30;  Stop 4/3/20 at 13:02;  Status DC


Info (PHARMACY MONITORING -- do not chart) 1 each PRN DAILY  PRN MC SEE 

COMMENTS;  Start 4/3/20 at 07:30;  Stop 4/5/20 at 12:45;  Status DC


Sodium Chloride 90 meq/Potassium Chloride 15 meq/ Potassium Phosphate 10 mmol/ 

Magnesium Sulfate 8 meq/Calcium Gluconate 15 meq/ Multivitamins 10 ml/Chromium/ 

Copper/Manganese/ Seleni/Zn 0.5 ml/ Insulin Human Regular 25 unit/ Total 

Parenteral Nutrition/Amino Acids/Dextrose/ Fat Emulsion Intravenous 1,400 ml @  

58.333 mls/ hr TPN  CONT IV  Last administered on 4/3/20at 22:19;  Start 4/3/20 

at 22:00;  Stop 4/4/20 at 21:59;  Status DC


Heparin Sodium (Porcine) (Heparin Sodium) 5,000 unit Q12HR SQ  Last administered

on 4/26/20at 08:59;  Start 4/3/20 at 21:00;  Stop 4/26/20 at 10:05;  Status DC


Ondansetron HCl (Zofran) 4 mg PRN Q6HRS  PRN IV NAUSEA/VOMITING;  Start 4/6/20 

at 07:00;  Stop 4/7/20 at 06:59;  Status DC


Fentanyl Citrate (Fentanyl 2ml Vial) 25 mcg PRN Q5MIN  PRN IV MILD PAIN 1-3;  

Start 4/6/20 at 07:00;  Stop 4/7/20 at 06:59;  Status DC


Fentanyl Citrate (Fentanyl 2ml Vial) 50 mcg PRN Q5MIN  PRN IV MODERATE TO SEVERE

PAIN;  Start 4/6/20 at 07:00;  Stop 4/7/20 at 06:59;  Status DC


Ringer's Solution 1,000 ml @  30 mls/hr Q24H IV ;  Start 4/6/20 at 07:00;  Stop 

4/6/20 at 18:59;  Status DC


Lidocaine HCl (Xylocaine-Mpf 1% 2ml Vial) 2 ml PRN 1X  PRN ID PRIOR TO IV START;

 Start 4/6/20 at 07:00;  Stop 4/7/20 at 06:59;  Status DC


Prochlorperazine Edisylate (Compazine) 5 mg PACU PRN  PRN IV NAUSEA, MRX1;  

Start 4/6/20 at 07:00;  Stop 4/7/20 at 06:59;  Status DC


Sodium Chloride 1,000 ml @  1,000 mls/hr Q1H PRN IV hypotension;  Start 4/4/20 

at 09:10;  Stop 4/4/20 at 15:09;  Status DC


Albumin Human 200 ml @  200 mls/hr 1X PRN  PRN IV Hypotension Last administered 

on 4/4/20at 10:10;  Start 4/4/20 at 09:15;  Stop 4/4/20 at 15:14;  Status DC


Sodium Chloride 1,000 ml @  400 mls/hr Q2H30M PRN IV PATENCY;  Start 4/4/20 at 

09:10;  Stop 4/4/20 at 21:09;  Status DC


Info (PHARMACY MONITORING -- do not chart) 1 each PRN DAILY  PRN MC SEE 

COMMENTS;  Start 4/4/20 at 09:15;  Stop 4/5/20 at 12:45;  Status DC


Info (PHARMACY MONITORING -- do not chart) 1 each PRN DAILY  PRN MC SEE 

COMMENTS;  Start 4/4/20 at 09:15;  Stop 4/5/20 at 12:45;  Status DC


Sodium Chloride 90 meq/Potassium Chloride 15 meq/ Potassium Phosphate 10 mmol/ 

Magnesium Sulfate 8 meq/Calcium Gluconate 15 meq/ Multivitamins 10 ml/Chromium/ 

Copper/Manganese/ Seleni/Zn 0.5 ml/ Insulin Human Regular 25 unit/ Total 

Parenteral Nutrition/Amino Acids/Dextrose/ Fat Emulsion Intravenous 1,400 ml @  

58.333 mls/ hr TPN  CONT IV  Last administered on 4/4/20at 22:10;  Start 4/4/20 

at 22:00;  Stop 4/5/20 at 21:59;  Status DC


Magnesium Sulfate 50 ml @ 25 mls/hr PRN DAILY  PRN IV for Mag < 1.7 on am labs 

Last administered on 4/20/20at 17:27;  Start 4/5/20 at 09:15


Sodium Chloride 90 meq/Potassium Chloride 15 meq/ Potassium Phosphate 10 mmol/ 

Magnesium Sulfate 8 meq/Calcium Gluconate 15 meq/ Multivitamins 10 ml/Chromium/ 

Copper/Manganese/ Seleni/Zn 0.5 ml/ Insulin Human Regular 25 unit/ Total 

Parenteral Nutrition/Amino Acids/Dextrose/ Fat Emulsion Intravenous 1,400 ml @  

58.333 mls/ hr TPN  CONT IV  Last administered on 4/5/20at 21:20;  Start 4/5/20 

at 22:00;  Stop 4/6/20 at 21:59;  Status DC


Sodium Chloride 1,000 ml @  1,000 mls/hr Q1H PRN IV hypotension;  Start 4/5/20 

at 12:23;  Stop 4/5/20 at 18:22;  Status DC


Albumin Human 200 ml @  200 mls/hr 1X  ONCE IV  Last administered on 4/5/20at 

13:34;  Start 4/5/20 at 12:30;  Stop 4/5/20 at 13:29;  Status DC


Diphenhydramine HCl (Benadryl) 25 mg 1X PRN  PRN IV ITCHING;  Start 4/5/20 at 

12:30;  Stop 4/6/20 at 12:29;  Status DC


Diphenhydramine HCl (Benadryl) 25 mg 1X PRN  PRN IV ITCHING;  Start 4/5/20 at 

12:30;  Stop 4/6/20 at 12:29;  Status DC


Info (PHARMACY MONITORING -- do not chart) 1 each PRN DAILY  PRN MC SEE 

COMMENTS;  Start 4/5/20 at 12:30;  Status Cancel


Bupivacaine HCl/ Epinephrine Bitart (Sensorcain-Epi 0.5%-1:[id number] Mpf) 30 ml 

STK-MED ONCE .ROUTE  Last administered on 4/6/20at 11:44;  Start 4/6/20 at 

11:00;  Stop 4/6/20 at 11:01;  Status DC


Cellulose (Surgicel Fibrillar 1x2) 1 each STK-MED ONCE .ROUTE ;  Start 4/6/20 at

11:00;  Stop 4/6/20 at 11:01;  Status DC


Sodium Chloride 90 meq/Potassium Chloride 15 meq/ Potassium Phosphate 10 mmol/ M

agnesium Sulfate 12 meq/Calcium Gluconate 15 meq/ Multivitamins 10 ml/Chromium/ 

Copper/Manganese/ Seleni/Zn 0.5 ml/ Insulin Human Regular 25 unit/ Total 

Parenteral Nutrition/Amino Acids/Dextrose/ Fat Emulsion Intravenous 1,400 ml @  

58.333 mls/ hr TPN  CONT IV  Last administered on 4/6/20at 22:24;  Start 4/6/20 

at 22:00;  Stop 4/7/20 at 21:59;  Status DC


Propofol 20 ml @ As Directed STK-MED ONCE IV ;  Start 4/6/20 at 11:07;  Stop 

4/6/20 at 11:07;  Status DC


Cellulose (Surgicel Hemostat 4x8) 1 each STK-MED ONCE .ROUTE  Last administered 

on 4/6/20at 11:44;  Start 4/6/20 at 11:55;  Stop 4/6/20 at 11:56;  Status DC


Sevoflurane (Ultane) 60 ml STK-MED ONCE IH ;  Start 4/6/20 at 12:46;  Stop 

4/6/20 at 12:46;  Status DC


Sodium Chloride 1,000 ml @  1,000 mls/hr Q1H PRN IV hypotension;  Start 4/6/20 

at 13:51;  Stop 4/6/20 at 19:50;  Status DC


Albumin Human 200 ml @  200 mls/hr 1X PRN  PRN IV Hypotension Last administered 

on 4/6/20at 14:51;  Start 4/6/20 at 14:00;  Stop 4/6/20 at 19:59;  Status DC


Diphenhydramine HCl (Benadryl) 25 mg 1X PRN  PRN IV ITCHING;  Start 4/6/20 at 

14:00;  Stop 4/7/20 at 13:59;  Status DC


Diphenhydramine HCl (Benadryl) 25 mg 1X PRN  PRN IV ITCHING;  Start 4/6/20 at 

14:00;  Stop 4/7/20 at 13:59;  Status DC


Sodium Chloride 1,000 ml @  400 mls/hr Q2H30M PRN IV PATENCY;  Start 4/6/20 at 

13:51;  Stop 4/7/20 at 01:50;  Status DC


Info (PHARMACY MONITORING -- do not chart) 1 each PRN DAILY  PRN MC SEE 

COMMENTS;  Start 4/6/20 at 14:00;  Stop 4/9/20 at 08:16;  Status DC


Heparin Sodium (Porcine) (Hep Lock Adult) 500 unit STK-MED ONCE IVP ;  Start 

4/7/20 at 09:29;  Stop 4/7/20 at 09:30;  Status DC


Sodium Chloride 1,000 ml @  1,000 mls/hr Q1H PRN IV hypotension;  Start 4/7/20 

at 10:43;  Stop 4/7/20 at 16:42;  Status DC


Sodium Chloride 1,000 ml @  400 mls/hr Q2H30M PRN IV PATENCY;  Start 4/7/20 at 

10:43;  Stop 4/7/20 at 22:42;  Status DC


Info (PHARMACY MONITORING -- do not chart) 1 each PRN DAILY  PRN MC SEE 

COMMENTS;  Start 4/7/20 at 10:45;  Status UNV


Info (PHARMACY MONITORING -- do not chart) 1 each PRN DAILY  PRN MC SEE 

COMMENTS;  Start 4/7/20 at 10:45;  Status UNV


Sodium Chloride 90 meq/Potassium Chloride 15 meq/ Magnesium Sulfate 12 

meq/Calcium Gluconate 15 meq/ Multivitamins 10 ml/Chromium/ Copper/Manganese/ 

Seleni/Zn 0.5 ml/ Insulin Human Regular 25 unit/ Total Parenteral 

Nutrition/Amino Acids/Dextrose/ Fat Emulsion Intravenous 1,400 ml @  58.333 mls/

hr TPN  CONT IV  Last administered on 4/7/20at 22:13;  Start 4/7/20 at 22:00;  

Stop 4/8/20 at 21:59;  Status DC


Sodium Chloride 1,000 ml @  1,000 mls/hr Q1H PRN IV hypotension;  Start 4/8/20 

at 07:50;  Stop 4/8/20 at 13:49;  Status DC


Albumin Human 200 ml @  200 mls/hr 1X  ONCE IV ;  Start 4/8/20 at 08:00;  Stop 

4/8/20 at 08:53;  Status DC


Diphenhydramine HCl (Benadryl) 25 mg 1X PRN  PRN IV ITCHING;  Start 4/8/20 at 

08:00;  Stop 4/9/20 at 07:59;  Status DC


Diphenhydramine HCl (Benadryl) 25 mg 1X PRN  PRN IV ITCHING;  Start 4/8/20 at 08

:00;  Stop 4/9/20 at 07:59;  Status DC


Info (PHARMACY MONITORING -- do not chart) 1 each PRN DAILY  PRN MC SEE 

COMMENTS;  Start 4/8/20 at 08:00;  Stop 4/9/20 at 08:16;  Status DC


Albumin Human 50 ml @ 50 mls/hr 1X  ONCE IV ;  Start 4/8/20 at 08:53;  Stop 4 /8/20 at 08:56;  Status DC


Albumin Human 200 ml @  50 mls/hr PRN 1X  PRN IV HYPOTENSION Last administered 

on 4/14/20at 11:54;  Start 4/8/20 at 09:00;  Stop 5/21/20 at 11:14;  Status DC


Meropenem 500 mg/ Sodium Chloride 50 ml @  100 mls/hr Q12H IV  Last administered

on 4/28/20at 10:45;  Start 4/8/20 at 10:00;  Stop 4/28/20 at 12:37;  Status DC


Sodium Chloride 90 meq/Magnesium Sulfate 12 meq/ Calcium Gluconate 15 meq/ 

Multivitamins 10 ml/Chromium/ Copper/Manganese/ Seleni/Zn 0.5 ml/ Insulin Human 

Regular 25 unit/ Total Parenteral Nutrition/Amino Acids/Dextrose/ Fat Emulsion 

Intravenous 1,400 ml @  58.333 mls/ hr TPN  CONT IV  Last administered on 

4/8/20at 21:41;  Start 4/8/20 at 22:00;  Stop 4/9/20 at 21:59;  Status DC


Sodium Chloride 1,000 ml @  1,000 mls/hr Q1H PRN IV hypotension;  Start 4/9/20 

at 07:58;  Stop 4/9/20 at 13:57;  Status DC


Albumin Human 200 ml @  200 mls/hr 1X PRN  PRN IV Hypotension Last administered 

on 4/9/20at 09:30;  Start 4/9/20 at 08:00;  Stop 4/9/20 at 13:59;  Status DC


Sodium Chloride 1,000 ml @  400 mls/hr Q2H30M PRN IV PATENCY;  Start 4/9/20 at 

07:58;  Stop 4/9/20 at 19:57;  Status DC


Info (PHARMACY MONITORING -- do not chart) 1 each PRN DAILY  PRN MC SEE 

COMMENTS;  Start 4/9/20 at 08:00;  Status Cancel


Info (PHARMACY MONITORING -- do not chart) 1 each PRN DAILY  PRN MC SEE 

COMMENTS;  Start 4/9/20 at 08:15;  Status UNV


Sodium Chloride 90 meq/Potassium Phosphate 5 mmol/ Magnesium Sulfate 12 

meq/Calcium Gluconate 15 meq/ Multivitamins 10 ml/Chromium/ Copper/Manganese/ 

Seleni/Zn 0.5 ml/ Insulin Human Regular 30 unit/ Total Parenteral 

Nutrition/Amino Acids/Dextrose/ Fat Emulsion Intravenous 1,400 ml @  58.333 mls/

hr TPN  CONT IV  Last administered on 4/9/20at 22:08;  Start 4/9/20 at 22:00;  

Stop 4/10/20 at 21:59;  Status DC


Linezolid/Dextrose 300 ml @  300 mls/hr Q12HR IV  Last administered on 4/20/20at

20:40;  Start 4/10/20 at 11:00;  Stop 4/21/20 at 08:10;  Status DC


Sodium Chloride 90 meq/Potassium Phosphate 15 mmol/ Magnesium Sulfate 12 

meq/Calcium Gluconate 15 meq/ Multivitamins 10 ml/Chromium/ Copper/Manganese/ 

Seleni/Zn 0.5 ml/ Insulin Human Regular 30 unit/ Total Parenteral 

Nutrition/Amino Acids/Dextrose/ Fat Emulsion Intravenous 1,400 ml @  58.333 mls/

hr TPN  CONT IV  Last administered on 4/10/20at 21:49;  Start 4/10/20 at 22:00; 

Stop 4/11/20 at 21:59;  Status DC


Sodium Chloride 90 meq/Potassium Phosphate 15 mmol/ Magnesium Sulfate 12 

meq/Calcium Gluconate 15 meq/ Multivitamins 10 ml/Chromium/ Copper/Manganese/ 

Seleni/Zn 0.5 ml/ Insulin Human Regular 40 unit/ Total Parenteral Nutrit

ion/Amino Acids/Dextrose/ Fat Emulsion Intravenous 1,400 ml @  58.333 mls/ hr 

TPN  CONT IV  Last administered on 4/11/20at 21:21;  Start 4/11/20 at 22:00;  

Stop 4/12/20 at 21:59;  Status DC


Sodium Chloride 1,000 ml @  1,000 mls/hr Q1H PRN IV hypotension;  Start 4/11/20 

at 13:26;  Stop 4/11/20 at 19:25;  Status DC


Albumin Human 200 ml @  200 mls/hr 1X PRN  PRN IV Hypotension Last administered 

on 4/11/20at 15:00;  Start 4/11/20 at 13:30;  Stop 4/11/20 at 19:29;  Status DC


Sodium Chloride (Normal Saline Flush) 10 ml 1X PRN  PRN IV AP catheter pack;  

Start 4/11/20 at 13:30;  Stop 4/12/20 at 13:29;  Status DC


Sodium Chloride (Normal Saline Flush) 10 ml 1X PRN  PRN IV  catheter pack;  

Start 4/11/20 at 13:30;  Stop 4/12/20 at 13:29;  Status DC


Sodium Chloride 1,000 ml @  400 mls/hr Q2H30M PRN IV PATENCY;  Start 4/11/20 at 

13:26;  Stop 4/12/20 at 01:25;  Status DC


Info (PHARMACY MONITORING -- do not chart) 1 each PRN DAILY  PRN MC SEE 

COMMENTS;  Start 4/11/20 at 13:30;  Stop 4/11/20 at 13:33;  Status DC


Info (PHARMACY MONITORING -- do not chart) 1 each PRN DAILY  PRN MC SEE 

COMMENTS;  Start 4/11/20 at 13:30;  Stop 4/11/20 at 13:34;  Status DC


Sodium Chloride 90 meq/Potassium Phosphate 19 mmol/ Magnesium Sulfate 12 

meq/Calcium Gluconate 15 meq/ Multivitamins 10 ml/Chromium/ Copper/Manganese/ 

Seleni/Zn 0.5 ml/ Insulin Human Regular 40 unit/ Total Parenteral 

Nutrition/Amino Acids/Dextrose/ Fat Emulsion Intravenous 1,400 ml @  58.333 mls/

hr TPN  CONT IV  Last administered on 4/12/20at 21:54;  Start 4/12/20 at 22:00; 

Stop 4/13/20 at 21:59;  Status DC


Sodium Chloride 1,000 ml @  1,000 mls/hr Q1H PRN IV hypotension;  Start 4/13/20 

at 09:35;  Stop 4/13/20 at 15:34;  Status DC


Albumin Human 200 ml @  200 mls/hr 1X PRN  PRN IV Hypotension;  Start 4/13/20 at

09:45;  Stop 4/13/20 at 15:44;  Status DC


Diphenhydramine HCl (Benadryl) 25 mg 1X PRN  PRN IV ITCHING;  Start 4/13/20 at 

09:45;  Stop 4/14/20 at 09:44;  Status DC


Diphenhydramine HCl (Benadryl) 25 mg 1X PRN  PRN IV ITCHING;  Start 4/13/20 at 

09:45;  Stop 4/14/20 at 09:44;  Status DC


Sodium Chloride 1,000 ml @  400 mls/hr Q2H30M PRN IV PATENCY;  Start 4/13/20 at 

09:35;  Stop 4/13/20 at 21:34;  Status DC


Info (PHARMACY MONITORING -- do not chart) 1 each PRN DAILY  PRN MC SEE 

COMMENTS;  Start 4/13/20 at 09:45;  Status Cancel


Sodium Chloride 100 meq/Potassium Phosphate 19 mmol/ Magnesium Sulfate 12 

meq/Calcium Gluconate 15 meq/ Multivitamins 10 ml/Chromium/ Copper/Manganese/ 

Seleni/Zn 0.5 ml/ Insulin Human Regular 40 unit/ Potassium Chloride 20 meq/ 

Total Parenteral Nutrition/Amino Acids/Dextrose/ Fat Emulsion Intravenous 1,400 

ml @  58.333 mls/ hr TPN  CONT IV  Last administered on 4/13/20at 22:02;  Start 

4/13/20 at 22:00;  Stop 4/14/20 at 21:59;  Status DC


Furosemide (Lasix) 40 mg 1X  ONCE IVP  Last administered on 4/13/20at 14:39;  

Start 4/13/20 at 14:30;  Stop 4/13/20 at 14:31;  Status DC


Metronidazole 100 ml @  100 mls/hr Q8HRS IV  Last administered on 4/21/20at 

06:04;  Start 4/14/20 at 10:00;  Stop 4/21/20 at 08:10;  Status DC


Sodium Chloride 1,000 ml @  1,000 mls/hr Q1H PRN IV hypotension;  Start 4/14/20 

at 08:00;  Stop 4/14/20 at 13:59;  Status DC


Albumin Human 200 ml @  200 mls/hr 1X PRN  PRN IV Hypotension;  Start 4/14/20 at

08:00;  Stop 4/14/20 at 13:59;  Status DC


Sodium Chloride 1,000 ml @  400 mls/hr Q2H30M PRN IV PATENCY;  Start 4/14/20 at 

08:00;  Stop 4/14/20 at 19:59;  Status DC


Info (PHARMACY MONITORING -- do not chart) 1 each PRN DAILY  PRN MC SEE 

COMMENTS;  Start 4/14/20 at 11:30;  Status UNV


Info (PHARMACY MONITORING -- do not chart) 1 each PRN DAILY  PRN MC SEE 

COMMENTS;  Start 4/14/20 at 11:30;  Stop 4/16/20 at 12:13;  Status DC


Sodium Chloride 100 meq/Potassium Phosphate 19 mmol/ Magnesium Sulfate 12 

meq/Calcium Gluconate 15 meq/ Multivitamins 10 ml/Chromium/ Copper/Manganese/ 

Seleni/Zn 0.5 ml/ Insulin Human Regular 40 unit/ Potassium Chloride 20 meq/ 

Total Parenteral Nutrition/Amino Acids/Dextrose/ Fat Emulsion Intravenous 1,400 

ml @  58.333 mls/ hr TPN  CONT IV  Last administered on 4/14/20at 21:52;  Start 

4/14/20 at 22:00;  Stop 4/15/20 at 21:59;  Status DC


Sodium Chloride (Normal Saline Flush) 10 ml QSHIFT  PRN IV AFTER MEDS AND BLOOD 

DRAWS;  Start 4/14/20 at 15:00;  Stop 5/12/20 at 11:27;  Status DC


Sodium Chloride (Normal Saline Flush) 10 ml PRN Q5MIN  PRN IV AFTER MEDS AND 

BLOOD DRAWS;  Start 4/14/20 at 15:00


Sodium Chloride (Normal Saline Flush) 20 ml PRN Q5MIN  PRN IV AFTER MEDS AND 

BLOOD DRAWS;  Start 4/14/20 at 15:00


Sodium Chloride 100 meq/Potassium Phosphate 19 mmol/ Magnesium Sulfate 12 

meq/Calcium Gluconate 15 meq/ Multivitamins 10 ml/Chromium/ Copper/Manganese/ 

Seleni/Zn 0.5 ml/ Insulin Human Regular 40 unit/ Potassium Chloride 20 meq/ 

Total Parenteral Nutrition/Amino Acids/Dextrose/ Fat Emulsion Intravenous 1,400 

ml @  58.333 mls/ hr TPN  CONT IV  Last administered on 4/15/20at 21:20;  Start 

4/15/20 at 22:00;  Stop 4/16/20 at 21:59;  Status DC


Lidocaine HCl (Buffered Lidocaine 1%) 3 ml STK-MED ONCE .ROUTE ;  Start 4/15/20 

at 13:16;  Stop 4/15/20 at 13:16;  Status DC


Lidocaine HCl (Buffered Lidocaine 1%) 6 ml 1X  ONCE INJ  Last administered on 

4/15/20at 13:45;  Start 4/15/20 at 13:30;  Stop 4/15/20 at 13:31;  Status DC


Albumin Human 100 ml @  100 mls/hr 1X  ONCE IV  Last administered on 4/15/20at 

15:41;  Start 4/15/20 at 15:00;  Stop 4/15/20 at 15:59;  Status DC


Albumin Human 50 ml @ 50 mls/hr 1X  ONCE IV  Last administered on 4/15/20at 

15:00;  Start 4/15/20 at 15:00;  Stop 4/15/20 at 15:59;  Status DC


Info (PHARMACY MONITORING -- do not chart) 1 each PRN DAILY  PRN MC SEE 

COMMENTS;  Start 4/16/20 at 11:30;  Status Cancel


Info (PHARMACY MONITORING -- do not chart) 1 each PRN DAILY  PRN MC SEE 

COMMENTS;  Start 4/16/20 at 11:30;  Status UNV


Sodium Chloride 100 meq/Potassium Phosphate 10 mmol/ Magnesium Sulfate 12 

meq/Calcium Gluconate 15 meq/ Multivitamins 10 ml/Chromium/ Copper/Manganese/ 

Seleni/Zn 0.5 ml/ Insulin Human Regular 35 unit/ Potassium Chloride 20 meq/ 

Total Parenteral Nutrition/Amino Acids/Dextrose/ Fat Emulsion Intravenous 1,400 

ml @  58.333 mls/ hr TPN  CONT IV  Last administered on 4/16/20at 22:10;  Start 

4/16/20 at 22:00;  Stop 4/17/20 at 21:59;  Status DC


Sodium Chloride 100 meq/Potassium Phosphate 5 mmol/ Magnesium Sulfate 12 

meq/Calcium Gluconate 15 meq/ Multivitamins 10 ml/Chromium/ Copper/Manganese/ 

Seleni/Zn 0.5 ml/ Insulin Human Regular 35 unit/ Potassium Chloride 20 meq/ 

Total Parenteral Nutrition/Amino Acids/Dextrose/ Fat Emulsion Intravenous 1,400 

ml @  58.333 mls/ hr TPN  CONT IV  Last administered on 4/17/20at 22:59;  Start 

4/17/20 at 22:00;  Stop 4/18/20 at 21:59;  Status DC


Sodium Chloride 1,000 ml @  1,000 mls/hr Q1H PRN IV hypotension;  Start 4/18/20 

at 08:27;  Stop 4/18/20 at 14:26;  Status DC


Albumin Human 200 ml @  200 mls/hr 1X PRN  PRN IV Hypotension Last administered 

on 4/18/20at 09:18;  Start 4/18/20 at 08:30;  Stop 4/18/20 at 14:29;  Status DC


Sodium Chloride 1,000 ml @  400 mls/hr Q2H30M PRN IV PATENCY;  Start 4/18/20 at 

08:27;  Stop 4/18/20 at 20:26;  Status DC


Info (PHARMACY MONITORING -- do not chart) 1 each PRN DAILY  PRN MC SEE 

COMMENTS;  Start 4/18/20 at 08:30;  Status Cancel


Info (PHARMACY MONITORING -- do not chart) 1 each PRN DAILY  PRN MC SEE 

COMMENTS;  Start 4/18/20 at 08:30;  Stop 4/26/20 at 13:10;  Status DC


Sodium Chloride 100 meq/Potassium Chloride 40 meq/ Magnesium Sulfate 15 

meq/Calcium Gluconate 15 meq/ Multivitamins 10 ml/Chromium/ Copper/Manganese/ S

haley/Zn 0.5 ml/ Insulin Human Regular 35 unit/ Total Parenteral Nutrition/Amino

Acids/Dextrose/ Fat Emulsion Intravenous 1,400 ml @  58.333 mls/ hr TPN  CONT IV

 Last administered on 4/18/20at 22:00;  Start 4/18/20 at 22:00;  Stop 4/19/20 at

21:59;  Status DC


Potassium Chloride/Water 100 ml @  100 mls/hr 1X  ONCE IV  Last administered on 

4/18/20at 17:28;  Start 4/18/20 at 14:45;  Stop 4/18/20 at 15:44;  Status DC


Sodium Chloride 100 meq/Potassium Chloride 40 meq/ Magnesium Sulfate 15 

meq/Calcium Gluconate 15 meq/ Multivitamins 10 ml/Chromium/ Copper/Manganese/ 

Seleni/Zn 0.5 ml/ Insulin Human Regular 35 unit/ Total Parenteral Nutrit

ion/Amino Acids/Dextrose/ Fat Emulsion Intravenous 1,400 ml @  58.333 mls/ hr 

TPN  CONT IV  Last administered on 4/19/20at 22:46;  Start 4/19/20 at 22:00;  

Stop 4/20/20 at 21:59;  Status DC


Sodium Chloride 100 meq/Potassium Chloride 40 meq/ Magnesium Sulfate 20 

meq/Calcium Gluconate 15 meq/ Multivitamins 10 ml/Chromium/ Copper/Manganese/ 

Seleni/Zn 0.5 ml/ Insulin Human Regular 35 unit/ Total Parenteral 

Nutrition/Amino Acids/Dextrose/ Fat Emulsion Intravenous 1,400 ml @  58.333 mls/

hr TPN  CONT IV  Last administered on 4/20/20at 22:31;  Start 4/20/20 at 22:00; 

Stop 4/21/20 at 21:59;  Status DC


Fentanyl Citrate (Fentanyl 2ml Vial) 50 mcg PRN Q2HR  PRN IVP PAIN Last 

administered on 4/27/20at 13:32;  Start 4/20/20 at 21:00;  Stop 4/28/20 at 

12:53;  Status DC


Fentanyl Citrate (Fentanyl 2ml Vial) 25 mcg PRN Q2HR  PRN IVP PAIN;  Start 

4/20/20 at 21:00;  Stop 4/28/20 at 12:54;  Status DC


Enoxaparin Sodium (Lovenox 100mg Syringe) 100 mg Q12HR SQ ;  Start 4/21/20 at 

21:00;  Status UNV


Amino Acids/ Glycerin/ Electrolytes 1,000 ml @  75 mls/hr [id number] IV ;  Start 

4/20/20 at 21:15;  Status UNV


Sodium Chloride 1,000 ml @  1,000 mls/hr Q1H PRN IV hypotension;  Start 4/21/20 

at 07:56;  Stop 4/21/20 at 13:55;  Status DC


Albumin Human 200 ml @  200 mls/hr 1X PRN  PRN IV Hypotension Last administered 

on 4/21/20at 08:40;  Start 4/21/20 at 08:00;  Stop 4/21/20 at 13:59;  Status DC


Sodium Chloride 1,000 ml @  400 mls/hr Q2H30M PRN IV PATENCY;  Start 4/21/20 at 

07:56;  Stop 4/21/20 at 19:55;  Status DC


Info (PHARMACY MONITORING -- do not chart) 1 each PRN DAILY  PRN MC SEE 

COMMENTS;  Start 4/21/20 at 08:00;  Status UNV


Info (PHARMACY MONITORING -- do not chart) 1 each PRN DAILY  PRN MC SEE 

COMMENTS;  Start 4/21/20 at 08:00;  Status UNV


Daptomycin 430 mg/ Sodium Chloride 50 ml @  100 mls/hr Q24H IV  Last 

administered on 4/21/20at 12:35;  Start 4/21/20 at 09:00;  Stop 4/21/20 at 

12:49;  Status DC


Sodium Chloride 100 meq/Potassium Chloride 40 meq/ Magnesium Sulfate 20 

meq/Calcium Gluconate 15 meq/ Multivitamins 10 ml/Chromium/ Copper/Manganese/ 

Seleni/Zn 0.5 ml/ Insulin Human Regular 35 unit/ Total Parenteral 

Nutrition/Amino Acids/Dextrose/ Fat Emulsion Intravenous 1,400 ml @  58.333 mls/

hr TPN  CONT IV  Last administered on 4/21/20at 21:26;  Start 4/21/20 at 22:00; 

Stop 4/22/20 at 21:59;  Status DC


Daptomycin 430 mg/ Sodium Chloride 50 ml @  100 mls/hr Q48H IV ;  Start 4/23/20 

at 09:00;  Stop 4/22/20 at 11:55;  Status DC


Sodium Chloride 100 meq/Potassium Chloride 40 meq/ Magnesium Sulfate 20 

meq/Calcium Gluconate 15 meq/ Multivitamins 10 ml/Chromium/ Copper/Manganese/ 

Seleni/Zn 0.5 ml/ Insulin Human Regular 35 unit/ Total Parenteral 

Nutrition/Amino Acids/Dextrose/ Fat Emulsion Intravenous 1,400 ml @  58.333 mls/

hr TPN  CONT IV  Last administered on 4/22/20at 22:27;  Start 4/22/20 at 22:00; 

Stop 4/23/20 at 21:59;  Status DC


Daptomycin 430 mg/ Sodium Chloride 50 ml @  100 mls/hr Q24H IV  Last 

administered on 4/24/20at 15:07;  Start 4/22/20 at 13:00;  Stop 4/25/20 at 

13:15;  Status DC


Sodium Chloride 100 meq/Potassium Chloride 40 meq/ Magnesium Sulfate 20 

meq/Calcium Gluconate 10 meq/ Multivitamins 10 ml/Chromium/ Copper/Manganese/ 

Seleni/Zn 0.5 ml/ Insulin Human Regular 35 unit/ Total Parenteral 

Nutrition/Amino Acids/Dextrose/ Fat Emulsion Intravenous 1,400 ml @  58.333 mls/

hr TPN  CONT IV  Last administered on 4/24/20at 00:06;  Start 4/23/20 at 22:00; 

Stop 4/24/20 at 21:59;  Status DC


Alteplase, Recombinant (Cathflo For Central Catheter Clearance) 1 mg 1X  ONCE 

INT CAT  Last administered on 4/24/20at 11:44;  Start 4/24/20 at 10:45;  Stop 

4/24/20 at 10:46;  Status DC


Ondansetron HCl (Zofran) 4 mg PRN Q6HRS  PRN IV NAUSEA/VOMITING;  Start 4/27/20 

at 07:00;  Stop 4/28/20 at 06:59;  Status DC


Fentanyl Citrate (Fentanyl 2ml Vial) 25 mcg PRN Q5MIN  PRN IV MILD PAIN 1-3;  

Start 4/27/20 at 07:00;  Stop 4/28/20 at 06:59;  Status DC


Fentanyl Citrate (Fentanyl 2ml Vial) 50 mcg PRN Q5MIN  PRN IV MODERATE TO SEVERE

PAIN Last administered on 4/27/20at 10:17;  Start 4/27/20 at 07:00;  Stop 

4/28/20 at 06:59;  Status DC


Ringer's Solution 1,000 ml @  30 mls/hr Q24H IV ;  Start 4/27/20 at 07:00;  Stop

4/27/20 at 18:59;  Status DC


Lidocaine HCl (Xylocaine-Mpf 1% 2ml Vial) 2 ml PRN 1X  PRN ID PRIOR TO IV START;

 Start 4/27/20 at 07:00;  Stop 4/28/20 at 06:59;  Status DC


Prochlorperazine Edisylate (Compazine) 5 mg PACU PRN  PRN IV NAUSEA, MRX1;  

Start 4/27/20 at 07:00;  Stop 4/28/20 at 06:59;  Status DC


Sodium Acetate 50 meq/Potassium Acetate 55 meq/ Magnesium Sulfate 20 meq/Calcium

Gluconate 10 meq/ Multivitamins 10 ml/Chromium/ Copper/Manganese/ Seleni/Zn 0.5 

ml/ Insulin Human Regular 35 unit/ Total Parenteral Nutrition/Amino 

Acids/Dextrose/ Fat Emulsion Intravenous 1,400 ml @  58.333 mls/ hr TPN  CONT IV

;  Start 4/24/20 at 22:00;  Stop 4/24/20 at 14:15;  Status DC


Sodium Acetate 50 meq/Potassium Acetate 55 meq/ Magnesium Sulfate 20 meq/Calcium

Gluconate 10 meq/ Multivitamins 10 ml/Chromium/ Copper/Manganese/ Seleni/Zn 0.5 

ml/ Insulin Human Regular 35 unit/ Total Parenteral Nutrition/Amino 

Acids/Dextrose/ Fat Emulsion Intravenous 1,800 ml @  75 mls/hr TPN  CONT IV  

Last administered on 4/24/20at 22:38;  Start 4/24/20 at 22:00;  Stop 4/25/20 at 

21:59;  Status DC


Sodium Chloride 1,000 ml @  1,000 mls/hr Q1H PRN IV hypotension;  Start 4/24/20 

at 15:31;  Stop 4/24/20 at 21:30;  Status DC


Diphenhydramine HCl (Benadryl) 25 mg 1X PRN  PRN IV ITCHING;  Start 4/24/20 at 

15:45;  Stop 4/25/20 at 15:44;  Status DC


Diphenhydramine HCl (Benadryl) 25 mg 1X PRN  PRN IV ITCHING;  Start 4/24/20 at 

15:45;  Stop 4/25/20 at 15:44;  Status DC


Sodium Chloride 1,000 ml @  400 mls/hr Q2H30M PRN IV PATENCY;  Start 4/24/20 at 

15:31;  Stop 4/25/20 at 03:30;  Status DC


Info (PHARMACY MONITORING -- do not chart) 1 each PRN DAILY  PRN MC SEE 

COMMENTS;  Start 4/24/20 at 15:45;  Stop 5/26/20 at 14:14;  Status DC


Sodium Acetate 50 meq/Potassium Acetate 55 meq/ Magnesium Sulfate 20 meq/Calcium

Gluconate 10 meq/ Multivitamins 10 ml/Chromium/ Copper/Manganese/ Seleni/Zn 0.5 

ml/ Insulin Human Regular 35 unit/ Total Parenteral Nutrition/Amino 

Acids/Dextrose/ Fat Emulsion Intravenous 1,800 ml @  75 mls/hr TPN  CONT IV  

Last administered on 4/25/20at 22:03;  Start 4/25/20 at 22:00;  Stop 4/26/20 at 

21:59;  Status DC


Daptomycin 430 mg/ Sodium Chloride 50 ml @  100 mls/hr Q24H IV  Last 

administered on 4/30/20at 13:00;  Start 4/25/20 at 13:00;  Stop 4/30/20 at 

20:58;  Status DC


Heparin Sodium (Porcine) 1000 unit/Sodium Chloride 1,001 ml @  1,001 mls/hr 1X  

ONCE IRR ;  Start 4/27/20 at 06:00;  Stop 4/27/20 at 06:59;  Status DC


Potassium Acetate 55 meq/Magnesium Sulfate 20 meq/ Calcium Gluconate 10 meq/ 

Multivitamins 10 ml/Chromium/ Copper/Manganese/ Seleni/Zn 0.5 ml/ Insulin Human 

Regular 35 unit/ Total Parenteral Nutrition/Amino Acids/Dextrose/ Fat Emulsion 

Intravenous 1,920 ml @  80 mls/hr TPN  CONT IV  Last administered on 4/26/20at 

22:10;  Start 4/26/20 at 22:00;  Stop 4/27/20 at 21:59;  Status DC


Dexamethasone Sodium Phosphate (Decadron) 4 mg STK-MED ONCE .ROUTE ;  Start 

4/27/20 at 10:56;  Stop 4/27/20 at 10:57;  Status DC


Ondansetron HCl (Zofran) 4 mg STK-MED ONCE .ROUTE ;  Start 4/27/20 at 10:56;  

Stop 4/27/20 at 10:57;  Status DC


Rocuronium Bromide (Zemuron) 50 mg STK-MED ONCE .ROUTE ;  Start 4/27/20 at 

10:56;  Stop 4/27/20 at 10:57;  Status DC


Fentanyl Citrate (Fentanyl 2ml Vial) 100 mcg STK-MED ONCE .ROUTE ;  Start 

4/27/20 at 10:56;  Stop 4/27/20 at 10:57;  Status DC


Bupivacaine HCl/ Epinephrine Bitart (Sensorcain-Epi 0.5%-1:475752 Mpf) 30 ml 

STK-MED ONCE .ROUTE  Last administered on 4/27/20at 12:01;  Start 4/27/20 at 

10:58;  Stop 4/27/20 at 10:58;  Status DC


Cellulose (Surgicel Hemostat 2x14) 1 each STK-MED ONCE .ROUTE ;  Start 4/27/20 

at 10:58;  Stop 4/27/20 at 10:59;  Status DC


Iohexol (Omnipaque 300 Mg/ml) 50 ml STK-MED ONCE .ROUTE ;  Start 4/27/20 at 

10:58;  Stop 4/27/20 at 10:59;  Status DC


Cellulose (Surgicel Hemostat 4x8) 1 each STK-MED ONCE .ROUTE ;  Start 4/27/20 at

10:58;  Stop 4/27/20 at 10:59;  Status DC


Bisacodyl (Dulcolax Supp) 10 mg STK-MED ONCE .ROUTE ;  Start 4/27/20 at 10:59;  

Stop 4/27/20 at 10:59;  Status DC


Heparin Sodium (Porcine) 1000 unit/Sodium Chloride 1,001 ml @  1,001 mls/hr 1X  

ONCE IRR ;  Start 4/27/20 at 12:00;  Stop 4/27/20 at 12:59;  Status DC


Propofol 20 ml @ As Directed STK-MED ONCE IV ;  Start 4/27/20 at 11:05;  Stop 

4/27/20 at 11:05;  Status DC


Sevoflurane (Ultane) 90 ml STK-MED ONCE IH ;  Start 4/27/20 at 11:05;  Stop 4 /27/20 at 11:05;  Status DC


Sevoflurane (Ultane) 60 ml STK-MED ONCE IH ;  Start 4/27/20 at 12:26;  Stop 

4/27/20 at 12:27;  Status DC


Propofol 20 ml @ As Directed STK-MED ONCE IV ;  Start 4/27/20 at 12:26;  Stop 

4/27/20 at 12:27;  Status DC


Phenylephrine HCl (PHENYLEPHRINE in 0.9% NACL PF) 1 mg STK-MED ONCE IV ;  Start 

4/27/20 at 12:34;  Stop 4/27/20 at 12:34;  Status DC


Heparin Sodium (Porcine) (Heparin Sodium) 5,000 unit Q12HR SQ  Last administered

on 5/6/20at 20:57;  Start 4/27/20 at 21:00;  Stop 5/7/20 at 09:59;  Status DC


Sodium Chloride (Normal Saline Flush) 3 ml QSHIFT  PRN IV AFTER MEDS AND BLOOD 

DRAWS;  Start 4/27/20 at 13:45


Naloxone HCl (Narcan) 0.4 mg PRN Q2MIN  PRN IV SEE INSTRUCTIONS;  Start 4/27/20 

at 13:45


Sodium Chloride 1,000 ml @  25 mls/hr Q24H IV  Last administered on 5/26/20at 

13:37;  Start 4/27/20 at 13:37


Naloxone HCl (Narcan) 0.4 mg PRN Q2MIN  PRN IV SEE INSTRUCTIONS;  Start 4/27/20 

at 14:30;  Status UNV


Sodium Chloride 1,000 ml @  25 mls/hr Q24H IV ;  Start 4/27/20 at 14:30;  Status

UNV


Hydromorphone HCl 30 ml @ 0 mls/hr CONT PRN  PRN IV PER PROTOCOL Last 

administered on 5/2/20at 16:08;  Start 4/27/20 at 14:30;  Stop 5/4/20 at 08:55; 

Status DC


Potassium Acetate 55 meq/Magnesium Sulfate 20 meq/ Calcium Gluconate 10 meq/ 

Multivitamins 10 ml/Chromium/ Copper/Manganese/ Seleni/Zn 0.5 ml/ Insulin Human 

Regular 35 unit/ Total Parenteral Nutrition/Amino Acids/Dextrose/ Fat Emulsion 

Intravenous 1,920 ml @  80 mls/hr TPN  CONT IV  Last administered on 4/27/20at 

22:01;  Start 4/27/20 at 22:00;  Stop 4/28/20 at 21:59;  Status DC


Bumetanide (Bumex) 2 mg [id number] IV  Last administered on 5/1/20at 13:50;  Start 

4/28/20 at 14:00;  Stop 5/2/20 at 14:10;  Status DC


Meropenem 1 gm/ Sodium Chloride 100 ml @  200 mls/hr Q8HRS IV  Last administered

on 5/22/20at 05:53;  Start 4/28/20 at 14:00;  Stop 5/22/20 at 09:31;  Status DC


Potassium Acetate 55 meq/Magnesium Sulfate 20 meq/ Calcium Gluconate 10 meq/ 

Multivitamins 10 ml/Chromium/ Copper/Manganese/ Seleni/Zn 0.5 ml/ Insulin Human 

Regular 35 unit/ Total Parenteral Nutrition/Amino Acids/Dextrose/ Fat Emulsion 

Intravenous 1,920 ml @  80 mls/hr TPN  CONT IV  Last administered on 4/28/20at 

22:02;  Start 4/28/20 at 22:00;  Stop 4/29/20 at 21:59;  Status DC


Hydromorphone HCl (Dilaudid Standard PCA) 12 mg STK-MED ONCE IV ;  Start 4/27/20

at 14:35;  Stop 4/28/20 at 13:53;  Status DC


Artificial Tears (Artificial Tears) 1 drop PRN Q15MIN  PRN OU DRY EYE Last 

administered on 5/24/20at 11:15;  Start 4/29/20 at 05:30


Hydromorphone HCl (Dilaudid Standard PCA) 12 mg STK-MED ONCE IV ;  Start 4/28/20

at 12:05;  Stop 4/29/20 at 09:15;  Status DC


Potassium Acetate 65 meq/Magnesium Sulfate 20 meq/ Calcium Gluconate 10 meq/ 

Multivitamins 10 ml/Chromium/ Copper/Manganese/ Seleni/Zn 0.5 ml/ Insulin Human 

Regular 30 unit/ Total Parenteral Nutrition/Amino Acids/Dextrose/ Fat Emulsion 

Intravenous 1,920 ml @  80 mls/hr TPN  CONT IV  Last administered on 4/29/20at 

22:22;  Start 4/29/20 at 22:00;  Stop 4/30/20 at 21:59;  Status DC


Cyclobenzaprine HCl (Flexeril) 10 mg PRN Q6HRS  PRN PO MUSCLE SPASMS;  Start 

4/30/20 at 10:45


Potassium Acetate 55 meq/Magnesium Sulfate 20 meq/ Calcium Gluconate 10 meq/ 

Multivitamins 10 ml/Chromium/ Copper/Manganese/ Seleni/Zn 0.5 ml/ Insulin Human 

Regular 30 unit/ Total Parenteral Nutrition/Amino Acids/Dextrose/ Fat Emulsion 

Intravenous 1,920 ml @  80 mls/hr TPN  CONT IV  Last administered on 5/1/20at 

01:00;  Start 4/30/20 at 22:00;  Stop 5/1/20 at 21:59;  Status DC


Magnesium Sulfate 50 ml @ 25 mls/hr 1X  ONCE IV  Last administered on 4/30/20at 

17:18;  Start 4/30/20 at 12:45;  Stop 4/30/20 at 14:44;  Status DC


Potassium Chloride/Water 100 ml @  100 mls/hr 1X  ONCE IV  Last administered on 

5/1/20at 11:27;  Start 5/1/20 at 12:00;  Stop 5/1/20 at 12:59;  Status DC


Hydromorphone HCl (Dilaudid Standard PCA) 12 mg STK-MED ONCE IV ;  Start 4/29/20

at 10:50;  Stop 5/1/20 at 11:02;  Status DC


Hydromorphone HCl (Dilaudid Standard PCA) 12 mg STK-MED ONCE IV ;  Start 4/30/20

at 13:47;  Stop 5/1/20 at 11:03;  Status DC


Potassium Acetate 30 meq/Magnesium Sulfate 20 meq/ Calcium Gluconate 10 meq/ 

Multivitamins 10 ml/Chromium/ Copper/Manganese/ Seleni/Zn 0.5 ml/ Insulin Human 

Regular 30 unit/ Potassium Chloride 30 meq/ Total Parenteral Nutrition/Amino 

Acids/Dextrose/ Fat Emulsion Intravenous 1,920 ml @  80 mls/hr TPN  CONT IV  

Last administered on 5/1/20at 22:34;  Start 5/1/20 at 22:00;  Stop 5/2/20 at 

21:59;  Status DC


Potassium Chloride/Water 100 ml @  100 mls/hr Q1H IV  Last administered on 

5/2/20at 13:05;  Start 5/2/20 at 07:00;  Stop 5/2/20 at 10:59;  Status DC


Magnesium Sulfate 50 ml @ 25 mls/hr 1X  ONCE IV  Last administered on 5/2/20at 

10:34;  Start 5/2/20 at 10:30;  Stop 5/2/20 at 12:29;  Status DC


Potassium Chloride 75 meq/ Magnesium Sulfate 20 meq/Calcium Gluconate 10 meq/ 

Multivitamins 10 ml/Chromium/ Copper/Manganese/ Seleni/Zn 0.5 ml/ Insulin Human 

Regular 30 unit/ Total Parenteral Nutrition/Amino Acids/Dextrose/ Fat Emulsion 

Intravenous 1,920 ml @  80 mls/hr TPN  CONT IV  Last administered on 5/2/20at 

21:51;  Start 5/2/20 at 22:00;  Stop 5/3/20 at 22:00;  Status DC


Potassium Chloride 75 meq/ Magnesium Sulfate 20 meq/Calcium Gluconate 10 meq/ 

Multivitamins 10 ml/Chromium/ Copper/Manganese/ Seleni/Zn 0.5 ml/ Insulin Human 

Regular 25 unit/ Total Parenteral Nutrition/Amino Acids/Dextrose/ Fat Emulsion 

Intravenous 1,920 ml @  80 mls/hr TPN  CONT IV  Last administered on 5/3/20at 

22:04;  Start 5/3/20 at 22:00;  Stop 5/4/20 at 21:59;  Status DC


Hydromorphone HCl (Dilaudid) 0.4 mg PRN Q4HRS  PRN IVP PAIN Last administered on

5/4/20at 10:57;  Start 5/4/20 at 09:00;  Stop 5/4/20 at 18:59;  Status DC


Micafungin Sodium 100 mg/Dextrose 100 ml @  100 mls/hr Q24H IV  Last 

administered on 5/26/20at 12:17;  Start 5/4/20 at 11:00


Daptomycin 485 mg/ Sodium Chloride 50 ml @  100 mls/hr Q24H IV  Last 

administered on 5/11/20at 13:10;  Start 5/4/20 at 11:00;  Stop 5/12/20 at 07:44;

 Status DC


Potassium Chloride 75 meq/ Magnesium Sulfate 15 meq/Calcium Gluconate 8 meq/ 

Multivitamins 10 ml/Chromium/ Copper/Manganese/ Seleni/Zn 0.5 ml/ Insulin Human 

Regular 25 unit/ Total Parenteral Nutrition/Amino Acids/Dextrose/ Fat Emulsion 

Intravenous 1,920 ml @  80 mls/hr TPN  CONT IV  Last administered on 5/4/20at 

23:08;  Start 5/4/20 at 22:00;  Stop 5/5/20 at 21:59;  Status DC


Haloperidol Lactate (Haldol Inj) 3 mg 1X  ONCE IVP  Last administered on 

5/4/20at 14:37;  Start 5/4/20 at 14:30;  Stop 5/4/20 at 14:31;  Status DC


Hydromorphone HCl (Dilaudid) 1 mg PRN Q4HRS  PRN IVP PAIN Last administered on 

5/18/20at 06:25;  Start 5/4/20 at 19:00;  Stop 5/18/20 at 17:10;  Status DC


Potassium Chloride 75 meq/ Magnesium Sulfate 15 meq/Calcium Gluconate 8 meq/ 

Multivitamins 10 ml/Chromium/ Copper/Manganese/ Seleni/Zn 0.5 ml/ Insulin Human 

Regular 20 unit/ Total Parenteral Nutrition/Amino Acids/Dextrose/ Fat Emulsion 

Intravenous 1,920 ml @  80 mls/hr TPN  CONT IV  Last administered on 5/5/20at 

22:10;  Start 5/5/20 at 22:00;  Stop 5/6/20 at 21:59;  Status DC


Lidocaine HCl (Buffered Lidocaine 1%) 3 ml STK-MED ONCE .ROUTE ;  Start 5/6/20 

at 11:31;  Stop 5/6/20 at 11:31;  Status DC


Lidocaine HCl (Buffered Lidocaine 1%) 3 ml STK-MED ONCE .ROUTE ;  Start 5/6/20 

at 12:28;  Stop 5/6/20 at 12:29;  Status DC


Lidocaine HCl (Buffered Lidocaine 1%) 6 ml 1X  ONCE INJ  Last administered on 

5/6/20at 12:53;  Start 5/6/20 at 12:45;  Stop 5/6/20 at 12:46;  Status DC


Potassium Chloride 75 meq/ Magnesium Sulfate 15 meq/Calcium Gluconate 8 meq/ 

Multivitamins 10 ml/Chromium/ Copper/Manganese/ Seleni/Zn 0.5 ml/ Insulin Human 

Regular 20 unit/ Total Parenteral Nutrition/Amino Acids/Dextrose/ Fat Emulsion 

Intravenous 1,920 ml @  80 mls/hr TPN  CONT IV  Last administered on 5/6/20at 

22:00;  Start 5/6/20 at 22:00;  Stop 5/7/20 at 21:59;  Status DC


Potassium Chloride 75 meq/ Magnesium Sulfate 15 meq/Calcium Gluconate 8 meq/ 

Multivitamins 10 ml/Chromium/ Copper/Manganese/ Seleni/Zn 0.5 ml/ Insulin Human 

Regular 15 unit/ Total Parenteral Nutrition/Amino Acids/Dextrose/ Fat Emulsion 

Intravenous 1,920 ml @  80 mls/hr TPN  CONT IV  Last administered on 5/7/20at 

22:28;  Start 5/7/20 at 22:00;  Stop 5/8/20 at 21:59;  Status DC


Vecuronium Bromide (Norcuron Bolus) 6 mg PRN Q6HRS  PRN IV VENT ASYNCHRONY;  

Start 5/7/20 at 19:15;  Stop 5/7/20 at 19:35;  Status DC


Bumetanide (Bumex) 2 mg 1X  ONCE IV  Last administered on 5/7/20at 22:09;  Start

5/7/20 at 19:45;  Stop 5/7/20 at 19:46;  Status DC


Lidocaine HCl (Buffered Lidocaine 1%) 3 ml STK-MED ONCE .ROUTE ;  Start 5/8/20 

at 07:59;  Stop 5/8/20 at 07:59;  Status DC


Midazolam HCl (Versed) 5 mg STK-MED ONCE .ROUTE ;  Start 5/8/20 at 08:36;  Stop 

5/8/20 at 08:36;  Status DC


Fentanyl Citrate (Fentanyl 5ml Vial) 250 mcg STK-MED ONCE .ROUTE ;  Start 5/8/20

at 08:36;  Stop 5/8/20 at 08:37;  Status DC


Lidocaine HCl (Buffered Lidocaine 1%) 3 ml 1X  ONCE IJ  Last administered on 

5/8/20at 09:30;  Start 5/8/20 at 09:15;  Stop 5/8/20 at 09:16;  Status DC


Midazolam HCl (Versed) 5 mg 1X  ONCE IV  Last administered on 5/8/20at 09:30;  

Start 5/8/20 at 09:15;  Stop 5/8/20 at 09:16;  Status DC


Fentanyl Citrate (Fentanyl 5ml Vial) 250 mcg 1X  ONCE IV  Last administered on 

5/8/20at 09:30;  Start 5/8/20 at 09:15;  Stop 5/8/20 at 09:16;  Status DC


Bumetanide (Bumex) 2 mg DAILY IV  Last administered on 5/18/20at 08:07;  Start 

5/8/20 at 10:00;  Stop 5/18/20 at 17:15;  Status DC


Potassium Chloride 75 meq/ Magnesium Sulfate 15 meq/ Multivitamins 10 

ml/Chromium/ Copper/Manganese/ Seleni/Zn 0.5 ml/ Insulin Human Regular 15 unit/ 

Total Parenteral Nutrition/Amino Acids/Dextrose/ Fat Emulsion Intravenous 1,920 

ml @  80 mls/hr TPN  CONT IV  Last administered on 5/8/20at 21:59;  Start 5/8/20

at 22:00;  Stop 5/9/20 at 21:59;  Status DC


Metoclopramide HCl (Reglan Vial) 10 mg PRN Q3HRS  PRN IVP NAUSEA/VOMITING-3rd 

choice Last administered on 5/14/20at 04:25;  Start 5/9/20 at 16:45


Potassium Chloride 75 meq/ Magnesium Sulfate 15 meq/ Multivitamins 10 

ml/Chromium/ Copper/Manganese/ Seleni/Zn 0.5 ml/ Insulin Human Regular 15 unit/ 

Total Parenteral Nutrition/Amino Acids/Dextrose/ Fat Emulsion Intravenous 1,920 

ml @  80 mls/hr TPN  CONT IV  Last administered on 5/9/20at 22:41;  Start 5/9/20

at 22:00;  Stop 5/10/20 at 21:59;  Status DC


Magnesium Sulfate 50 ml @ 25 mls/hr 1X  ONCE IV  Last administered on 5/10/20at 

10:44;  Start 5/10/20 at 09:00;  Stop 5/10/20 at 10:59;  Status DC


Potassium Chloride/Water 100 ml @  100 mls/hr 1X  ONCE IV  Last administered on 

5/10/20at 09:37;  Start 5/10/20 at 09:00;  Stop 5/10/20 at 09:59;  Status DC


Duloxetine HCl (Cymbalta) 30 mg DAILY PO  Last administered on 5/11/20at 09:48; 

Start 5/10/20 at 14:00;  Stop 5/13/20 at 10:25;  Status DC


Potassium Chloride 80 meq/ Magnesium Sulfate 20 meq/ Multivitamins 10 

ml/Chromium/ Copper/Manganese/ Seleni/Zn 0.5 ml/ Insulin Human Regular 15 unit/ 

Total Parenteral Nutrition/Amino Acids/Dextrose/ Fat Emulsion Intravenous 1,920 

ml @  80 mls/hr TPN  CONT IV  Last administered on 5/10/20at 21:42;  Start 

5/10/20 at 22:00;  Stop 5/11/20 at 21:59;  Status DC


Potassium Chloride 80 meq/ Magnesium Sulfate 20 meq/ Multivitamins 10 

ml/Chromium/ Copper/Manganese/ Seleni/Zn 0.5 ml/ Insulin Human Regular 15 unit/ 

Total Parenteral Nutrition/Amino Acids/Dextrose/ Fat Emulsion Intravenous 1,920 

ml @  80 mls/hr TPN  CONT IV  Last administered on 5/11/20at 22:20;  Start 

5/11/20 at 22:00;  Stop 5/12/20 at 21:59;  Status DC


Lidocaine HCl (Buffered Lidocaine 1%) 3 ml STK-MED ONCE .ROUTE ;  Start 5/12/20 

at 09:54;  Stop 5/12/20 at 09:55;  Status DC


Hydromorphone HCl (Dilaudid Standard PCA) 12 mg STK-MED ONCE IV ;  Start 5/1/20 

at 15:50;  Stop 5/12/20 at 11:24;  Status DC


Potassium Chloride 80 meq/ Magnesium Sulfate 20 meq/ Multivitamins 10 

ml/Chromium/ Copper/Manganese/ Seleni/Zn 0.5 ml/ Insulin Human Regular 15 unit/ 

Total Parenteral Nutrition/Amino Acids/Dextrose/ Fat Emulsion Intravenous 1,920 

ml @  80 mls/hr TPN  CONT IV  Last administered on 5/12/20at 21:40;  Start 

5/12/20 at 22:00;  Stop 5/13/20 at 21:59;  Status DC


Lidocaine HCl (Buffered Lidocaine 1%) 6 ml 1X  ONCE INJ  Last administered on 

5/12/20at 14:15;  Start 5/12/20 at 14:15;  Stop 5/12/20 at 14:16;  Status DC


Potassium Chloride 80 meq/ Magnesium Sulfate 20 meq/ Multivitamins 10 

ml/Chromium/ Copper/Manganese/ Seleni/Zn 1 ml/ Insulin Human Regular 15 unit/ 

Total Parenteral Nutrition/Amino Acids/Dextrose/ Fat Emulsion Intravenous 1,920 

ml @  80 mls/hr TPN  CONT IV  Last administered on 5/13/20at 22:04;  Start 

5/13/20 at 22:00;  Stop 5/14/20 at 21:59;  Status DC


Potassium Chloride/Water 100 ml @  100 mls/hr 1X  ONCE IV  Last administered on 

5/14/20at 11:34;  Start 5/14/20 at 11:00;  Stop 5/14/20 at 11:59;  Status DC


Potassium Chloride 90 meq/ Magnesium Sulfate 20 meq/ Multivitamins 10 

ml/Chromium/ Copper/Manganese/ Seleni/Zn 1 ml/ Insulin Human Regular 15 unit/ 

Total Parenteral Nutrition/Amino Acids/Dextrose/ Fat Emulsion Intravenous 1,920 

ml @  80 mls/hr TPN  CONT IV  Last administered on 5/14/20at 22:57;  Start 

5/14/20 at 22:00;  Stop 5/15/20 at 21:59;  Status DC


Potassium Chloride 90 meq/ Magnesium Sulfate 20 meq/ Multivitamins 10 

ml/Chromium/ Copper/Manganese/ Seleni/Zn 1 ml/ Insulin Human Regular 15 unit/ 

Total Parenteral Nutrition/Amino Acids/Dextrose/ Fat Emulsion Intravenous 1,920 

ml @  80 mls/hr TPN  CONT IV  Last administered on 5/15/20at 22:48;  Start 

5/15/20 at 22:00;  Stop 5/16/20 at 21:59;  Status DC


Potassium Chloride 90 meq/ Magnesium Sulfate 20 meq/ Multivitamins 10 

ml/Chromium/ Copper/Manganese/ Seleni/Zn 1 ml/ Insulin Human Regular 15 unit/ 

Total Parenteral Nutrition/Amino Acids/Dextrose/ Fat Emulsion Intravenous 1,890 

ml @  78.75 mls/ hr TPN  CONT IV  Last administered on 5/16/20at 22:15;  Start 

5/16/20 at 22:00;  Stop 5/17/20 at 21:59;  Status DC


Linezolid/Dextrose 300 ml @  300 mls/hr Q12HR IV  Last administered on 5/19/20at

21:08;  Start 5/17/20 at 09:00;  Stop 5/20/20 at 08:11;  Status DC


Daptomycin 450 mg/ Sodium Chloride 50 ml @  100 mls/hr Q24H IV  Last 

administered on 5/20/20at 09:25;  Start 5/17/20 at 09:00;  Stop 5/21/20 at 

08:30;  Status DC


Potassium Chloride 90 meq/ Magnesium Sulfate 20 meq/ Multivitamins 10 

ml/Chromium/ Copper/Manganese/ Seleni/Zn 1 ml/ Insulin Human Regular 15 unit/ 

Total Parenteral Nutrition/Amino Acids/Dextrose/ Fat Emulsion Intravenous 1,890 

ml @  78.75 mls/ hr TPN  CONT IV  Last administered on 5/17/20at 21:34;  Start 

5/17/20 at 22:00;  Stop 5/18/20 at 21:59;  Status DC


Lorazepam (Ativan Inj) 2 mg STK-MED ONCE .ROUTE ;  Start 5/17/20 at 14:58;  Stop

5/17/20 at 14:58;  Status DC


Metoprolol Tartrate (Lopressor Vial) 5 mg 1X  ONCE IVP  Last administered on 

5/17/20at 15:31;  Start 5/17/20 at 15:15;  Stop 5/17/20 at 15:16;  Status DC


Lorazepam (Ativan Inj) 2 mg 1X  ONCE IVP  Last administered on 5/17/20at 15:30; 

Start 5/17/20 at 15:15;  Stop 5/17/20 at 15:16;  Status DC


Enoxaparin Sodium (Lovenox 40mg Syringe) 40 mg Q24H SQ  Last administered on 

5/26/20at 17:53;  Start 5/17/20 at 17:00


Lorazepam (Ativan Inj) 1 mg PRN Q4HRS  PRN IVP ANXIETY / AGITATION MILD-MOD Last

administered on 5/23/20at 04:21;  Start 5/17/20 at 19:15


Lorazepam (Ativan Inj) 2 mg PRN Q4HRS  PRN IVP ANXIETY / AGITATION SEVERE Last 

administered on 5/26/20at 23:23;  Start 5/17/20 at 19:15


Fentanyl Citrate (Fentanyl 2ml Vial) 50 mcg PRN Q4HRS  PRN IVP SEVERE PAIN Last 

administered on 5/27/20at 06:11;  Start 5/18/20 at 13:15


Fentanyl Citrate (Fentanyl 2ml Vial) 25 mcg PRN Q4HRS  PRN IVP MODERATE PAIN 

Last administered on 5/24/20at 06:36;  Start 5/18/20 at 13:15


Potassium Chloride 90 meq/ Magnesium Sulfate 20 meq/ Multivitamins 10 

ml/Chromium/ Copper/Manganese/ Seleni/Zn 1 ml/ Insulin Human Regular 15 unit/ 

Total Parenteral Nutrition/Amino Acids/Dextrose/ Fat Emulsion Intravenous 1,890 

ml @  78.75 mls/ hr TPN  CONT IV  Last administered on 5/18/20at 22:18;  Start 

5/18/20 at 22:00;  Stop 5/19/20 at 21:59;  Status DC


Furosemide (Lasix) 40 mg 1X  ONCE IVP  Last administered on 5/18/20at 21:51;  

Start 5/18/20 at 21:45;  Stop 5/18/20 at 21:48;  Status DC


Albumin Human 100 ml @  100 mls/hr 1X PRN  PRN IV SEE COMMENTS;  Start 5/19/20 

at 01:30


Furosemide (Lasix) 40 mg [id number] IVP  Last administered on 5/27/20at 08:16;  Start

5/19/20 at 14:00


Potassium Chloride 90 meq/ Magnesium Sulfate 20 meq/ Multivitamins 10 

ml/Chromium/ Copper/Manganese/ Seleni/Zn 1 ml/ Insulin Human Regular 15 unit/ 

Total Parenteral Nutrition/Amino Acids/Dextrose/ Fat Emulsion Intravenous 1,800 

ml @  75 mls/hr TPN  CONT IV  Last administered on 5/19/20at 22:31;  Start 

5/19/20 at 22:00;  Stop 5/20/20 at 21:59;  Status DC


Potassium Chloride 90 meq/ Magnesium Sulfate 20 meq/ Multivitamins 10 

ml/Chromium/ Copper/Manganese/ Seleni/Zn 1 ml/ Insulin Human Regular 15 unit/ 

Total Parenteral Nutrition/Amino Acids/Dextrose/ Fat Emulsion Intravenous 1,800 

ml @  75 mls/hr TPN  CONT IV  Last administered on 5/20/20at 22:28;  Start 

5/20/20 at 22:00;  Stop 5/21/20 at 21:59;  Status DC


Potassium Chloride 110 meq/ Magnesium Sulfate 20 meq/ Multivitamins 10 

ml/Chromium/ Copper/Manganese/ Seleni/Zn 1 ml/ Insulin Human Regular 15 unit/ 

Total Parenteral Nutrition/Amino Acids/Dextrose/ Fat Emulsion Intravenous 1,800 

ml @  75 mls/hr TPN  CONT IV  Last administered on 5/21/20at 22:01;  Start 5/2 1/20 at 22:00;  Stop 5/22/20 at 21:59;  Status DC


Saliva Substitute (Biotene Moisturizing Mouth) 2 spray PRN Q15MIN  PRN PO DRY 

MOUTH;  Start 5/21/20 at 11:00


Potassium Chloride 110 meq/ Magnesium Sulfate 20 meq/ Multivitamins 10 

ml/Chromium/ Copper/Manganese/ Seleni/Zn 1 ml/ Insulin Human Regular 15 unit/ 

Total Parenteral Nutrition/Amino Acids/Dextrose/ Fat Emulsion Intravenous 1,800 

ml @  75 mls/hr TPN  CONT IV  Last administered on 5/22/20at 22:21;  Start 

5/22/20 at 22:00;  Stop 5/23/20 at 21:59;  Status DC


Potassium Chloride 110 meq/ Magnesium Sulfate 20 meq/ Multivitamins 10 

ml/Chromium/ Copper/Manganese/ Seleni/Zn 1 ml/ Insulin Human Regular 15 unit/ 

Total Parenteral Nutrition/Amino Acids/Dextrose/ Fat Emulsion Intravenous 1,800 

ml @  75 mls/hr TPN  CONT IV  Last administered on 5/23/20at 22:04;  Start 

5/23/20 at 22:00;  Stop 5/24/20 at 21:59;  Status DC


Potassium Chloride 110 meq/ Magnesium Sulfate 20 meq/ Multivitamins 10 

ml/Chromium/ Copper/Manganese/ Seleni/Zn 1 ml/ Insulin Human Regular 15 unit/ To

chely Parenteral Nutrition/Amino Acids/Dextrose/ Fat Emulsion Intravenous 1,800 ml

@  75 mls/hr TPN  CONT IV  Last administered on 5/24/20at 22:48;  Start 5/24/20 

at 22:00;  Stop 5/25/20 at 21:59;  Status DC


Potassium Chloride 70 meq/ Magnesium Sulfate 20 meq/ Multivitamins 10 

ml/Chromium/ Copper/Manganese/ Seleni/Zn 1 ml/ Insulin Human Regular 15 unit/ 

Total Parenteral Nutrition/Amino Acids/Dextrose/ Fat Emulsion Intravenous 1,800 

ml @  75 mls/hr TPN  CONT IV  Last administered on 5/25/20at 21:39;  Start 5/2 5/20 at 22:00;  Stop 5/26/20 at 21:59;  Status DC


Meropenem 500 mg/ Sodium Chloride 50 ml @  100 mls/hr Q6HRS IV  Last administe

red on 5/27/20at 06:02;  Start 5/25/20 at 18:00


Barium Sulfate (Varibar Thin Liquid Apple) 148 gm 1X  ONCE PO ;  Start 5/26/20 a

t 11:45;  Stop 5/26/20 at 11:49;  Status DC


Potassium Chloride 70 meq/ Magnesium Sulfate 20 meq/ Multivitamins 10 

ml/Chromium/ Copper/Manganese/ Seleni/Zn 1 ml/ Insulin Human Regular 15 unit/ 

Total Parenteral Nutrition/Amino Acids/Dextrose/ Fat Emulsion Intravenous 1,800 

ml @  75 mls/hr TPN  CONT IV  Last administered on 5/26/20at 22:27;  Start 

5/26/20 at 22:00;  Stop 5/27/20 at 21:59





Active Scripts


Active


Reported


Bisoprolol Fumarate 5 Mg Tablet 10 Mg PO DAILY


Vitals/I & O





Vital Sign - Last 24 Hours








 5/26/20 5/26/20 5/26/20 5/26/20





 10:00 10:30 10:51 11:00


 


Pulse 125   134


 


Resp 24  40 26


 


B/P (MAP) 135/68 (90)   100/69 (79)


 


Pulse Ox 99 99 99 99


 


O2 Delivery Tracheal Collar Nasal Cannula  Tracheal Collar


 


O2 Flow Rate 8.0 3.0 8.0 8.0





 5/26/20 5/26/20 5/26/20 5/26/20





 11:21 11:30 12:00 12:00


 


Temp    98.9





    98.9


 


Pulse    115


 


Resp 25   20


 


B/P (MAP)    


 


Pulse Ox 97 100  99


 


O2 Delivery Tracheal Collar Tracheal Collar Trach Collar Tracheal Collar


 


O2 Flow Rate 2.0  8.0 8.0


 


    





    





 5/26/20 5/26/20 5/26/20 5/26/20





 13:00 13:27 14:00 15:00


 


Pulse 110  133 121


 


Resp 20  36 25


 


B/P (MAP) 109/72 (84)  131/83 (99) 119/78 (92)


 


Pulse Ox 99 99 99 99


 


O2 Delivery Tracheal Collar Nasal Cannula Tracheal Collar Tracheal Collar


 


O2 Flow Rate 8.0 3.0 8.0 8.0





 5/26/20 5/26/20 5/26/20 5/26/20





 16:00 16:00 16:26 16:56


 


Temp 98.8   





 98.8   


 


Pulse 118   


 


Resp 21  24 26


 


B/P (MAP) 121/78 (92)   


 


Pulse Ox 99  99 98


 


O2 Delivery Tracheal Collar Trach Collar Tracheal Collar Nasal Cannula


 


O2 Flow Rate 8.0 8.0 8.0 3.0


 


    





    





 5/26/20 5/26/20 5/26/20 5/26/20





 17:00 18:00 19:00 19:45


 


Pulse 138 110 117 


 


Resp 30 22 15 


 


B/P (MAP) 135/75 (95) 125/71 (89) 108/74 (85) 


 


Pulse Ox 99 99 99 96


 


O2 Delivery Tracheal Collar Tracheal Collar Tracheal Collar Tracheal Collar


 


O2 Flow Rate 8.0 8.0 8.0 





 5/26/20 5/26/20 5/26/20 5/26/20





 20:00 20:00 21:00 21:01


 


Temp 98.4   





 98.4   


 


Pulse 118  120 


 


Resp 16  16 27


 


B/P (MAP) 102/62 (75)  112/68 (83) 


 


Pulse Ox 99  99 99


 


O2 Delivery Tracheal Collar Trach Collar Tracheal Collar 


 


O2 Flow Rate 8.0 8.0 8.0 8.0


 


    





    





 5/26/20 5/26/20 5/26/20 5/27/20





 21:31 22:00 23:00 00:00


 


Pulse  112 121 


 


Resp 16 23 26 


 


B/P (MAP)  108/63 (78) 107/67 (80) 


 


Pulse Ox 99 99 94 


 


O2 Delivery  Tracheal Collar Tracheal Collar Trach Collar


 


O2 Flow Rate 8.0 8.0 8.0 8.0





 5/27/20 5/27/20 5/27/20 5/27/20





 00:01 01:00 02:00 03:00


 


Temp 98.1   





 98.1   


 


Pulse 112 115 113 124


 


Resp 24 34 14 30


 


B/P (MAP) 118/64 (82) 97/59 (72) 97/48 (64) 106/56 (73)


 


Pulse Ox 97 94 96 96


 


O2 Delivery Tracheal Collar Tracheal Collar Room Air Room Air


 


O2 Flow Rate 8.0 8.0  


 


    





    





 5/27/20 5/27/20 5/27/20 5/27/20





 04:00 04:00 05:00 06:00


 


Temp  98.1  





  98.1  


 


Pulse  121 120 120


 


Resp  30 26 26


 


B/P (MAP)  127/60 (82) 107/70 (82) 124/70 (88)


 


Pulse Ox  94 94 94


 


O2 Delivery Trach Collar Room Air Room Air Room Air


 


O2 Flow Rate 8.0   


 


    





    





 5/27/20 5/27/20 5/27/20 5/27/20





 06:11 06:41 07:00 08:00


 


Pulse   124 


 


Resp 27 26 25 


 


B/P (MAP)   117/61 (79) 


 


Pulse Ox 94 94 95 


 


O2 Delivery Room Air Room Air Room Air Room Air





 5/27/20 5/27/20 5/27/20 





 08:00 08:12 09:00 


 


Temp  98.7  





  98.7  


 


Pulse  129 136 


 


Resp  33 26 


 


B/P (MAP)  130/69 (89) 122/70 (87) 


 


Pulse Ox 95 94 96 


 


O2 Delivery Room Air Room Air Room Air 














Intake and Output   


 


 5/26/20 5/26/20 5/27/20





 15:00 23:00 07:00


 


Intake Total  1195 ml 928.0 ml


 


Output Total 790 ml 455 ml 430 ml


 


Balance -790 ml 740 ml 498.0 ml











Hemodynamically unstable?:  No


Is patient in severe pain?:  No


Is NPO status required?:  Yes











GAETANO GONCALVES MD        May 27, 2020 09:35

## 2020-05-28 VITALS — DIASTOLIC BLOOD PRESSURE: 85 MMHG | SYSTOLIC BLOOD PRESSURE: 123 MMHG

## 2020-05-28 VITALS — DIASTOLIC BLOOD PRESSURE: 60 MMHG | SYSTOLIC BLOOD PRESSURE: 111 MMHG

## 2020-05-28 VITALS — SYSTOLIC BLOOD PRESSURE: 111 MMHG | DIASTOLIC BLOOD PRESSURE: 59 MMHG

## 2020-05-28 VITALS — DIASTOLIC BLOOD PRESSURE: 73 MMHG | SYSTOLIC BLOOD PRESSURE: 136 MMHG

## 2020-05-28 VITALS — DIASTOLIC BLOOD PRESSURE: 60 MMHG | SYSTOLIC BLOOD PRESSURE: 121 MMHG

## 2020-05-28 VITALS — SYSTOLIC BLOOD PRESSURE: 127 MMHG | DIASTOLIC BLOOD PRESSURE: 70 MMHG

## 2020-05-28 VITALS — DIASTOLIC BLOOD PRESSURE: 73 MMHG | SYSTOLIC BLOOD PRESSURE: 140 MMHG

## 2020-05-28 VITALS — SYSTOLIC BLOOD PRESSURE: 97 MMHG | DIASTOLIC BLOOD PRESSURE: 62 MMHG

## 2020-05-28 VITALS — SYSTOLIC BLOOD PRESSURE: 122 MMHG | DIASTOLIC BLOOD PRESSURE: 72 MMHG

## 2020-05-28 VITALS — DIASTOLIC BLOOD PRESSURE: 61 MMHG | SYSTOLIC BLOOD PRESSURE: 108 MMHG

## 2020-05-28 VITALS — DIASTOLIC BLOOD PRESSURE: 60 MMHG | SYSTOLIC BLOOD PRESSURE: 116 MMHG

## 2020-05-28 VITALS — DIASTOLIC BLOOD PRESSURE: 59 MMHG | SYSTOLIC BLOOD PRESSURE: 114 MMHG

## 2020-05-28 VITALS — SYSTOLIC BLOOD PRESSURE: 141 MMHG | DIASTOLIC BLOOD PRESSURE: 83 MMHG

## 2020-05-28 VITALS — DIASTOLIC BLOOD PRESSURE: 63 MMHG | SYSTOLIC BLOOD PRESSURE: 140 MMHG

## 2020-05-28 VITALS — DIASTOLIC BLOOD PRESSURE: 73 MMHG | SYSTOLIC BLOOD PRESSURE: 117 MMHG

## 2020-05-28 VITALS — DIASTOLIC BLOOD PRESSURE: 70 MMHG | SYSTOLIC BLOOD PRESSURE: 122 MMHG

## 2020-05-28 VITALS — DIASTOLIC BLOOD PRESSURE: 87 MMHG | SYSTOLIC BLOOD PRESSURE: 110 MMHG

## 2020-05-28 VITALS — SYSTOLIC BLOOD PRESSURE: 99 MMHG | DIASTOLIC BLOOD PRESSURE: 61 MMHG

## 2020-05-28 VITALS — DIASTOLIC BLOOD PRESSURE: 65 MMHG | SYSTOLIC BLOOD PRESSURE: 121 MMHG

## 2020-05-28 VITALS — SYSTOLIC BLOOD PRESSURE: 112 MMHG | DIASTOLIC BLOOD PRESSURE: 64 MMHG

## 2020-05-28 VITALS — SYSTOLIC BLOOD PRESSURE: 117 MMHG | DIASTOLIC BLOOD PRESSURE: 67 MMHG

## 2020-05-28 VITALS — DIASTOLIC BLOOD PRESSURE: 86 MMHG | SYSTOLIC BLOOD PRESSURE: 157 MMHG

## 2020-05-28 VITALS — DIASTOLIC BLOOD PRESSURE: 87 MMHG | SYSTOLIC BLOOD PRESSURE: 138 MMHG

## 2020-05-28 LAB
ANION GAP SERPL CALC-SCNC: 9 MMOL/L (ref 6–14)
BUN SERPL-MCNC: 31 MG/DL (ref 7–20)
CALCIUM SERPL-MCNC: 10.1 MG/DL (ref 8.5–10.1)
CHLORIDE SERPL-SCNC: 98 MMOL/L (ref 98–107)
CO2 SERPL-SCNC: 30 MMOL/L (ref 21–32)
CREAT SERPL-MCNC: 0.9 MG/DL (ref 0.6–1)
GFR SERPLBLD BASED ON 1.73 SQ M-ARVRAT: 66.5 ML/MIN
GLUCOSE SERPL-MCNC: 155 MG/DL (ref 70–99)
POTASSIUM SERPL-SCNC: 4 MMOL/L (ref 3.5–5.1)
SODIUM SERPL-SCNC: 137 MMOL/L (ref 136–145)
TRIGL SERPL-MCNC: 206 MG/DL (ref 0–150)

## 2020-05-28 RX ADMIN — Medication PRN EACH: at 13:08

## 2020-05-28 RX ADMIN — PIPERACILLIN SODIUM AND TAZOBACTAM SODIUM SCH MLS/HR: 3; .375 INJECTION, POWDER, LYOPHILIZED, FOR SOLUTION INTRAVENOUS at 05:31

## 2020-05-28 RX ADMIN — BACITRACIN SCH MLS/HR: 5000 INJECTION, POWDER, FOR SOLUTION INTRAMUSCULAR at 13:37

## 2020-05-28 RX ADMIN — INSULIN LISPRO SCH UNITS: 100 INJECTION, SOLUTION INTRAVENOUS; SUBCUTANEOUS at 17:53

## 2020-05-28 RX ADMIN — ENOXAPARIN SODIUM SCH MG: 40 INJECTION SUBCUTANEOUS at 17:34

## 2020-05-28 RX ADMIN — PIPERACILLIN SODIUM AND TAZOBACTAM SODIUM SCH MLS/HR: 3; .375 INJECTION, POWDER, LYOPHILIZED, FOR SOLUTION INTRAVENOUS at 17:34

## 2020-05-28 RX ADMIN — PIPERACILLIN SODIUM AND TAZOBACTAM SODIUM SCH MLS/HR: 3; .375 INJECTION, POWDER, LYOPHILIZED, FOR SOLUTION INTRAVENOUS at 00:28

## 2020-05-28 RX ADMIN — INSULIN LISPRO SCH UNITS: 100 INJECTION, SOLUTION INTRAVENOUS; SUBCUTANEOUS at 05:28

## 2020-05-28 RX ADMIN — ONDANSETRON PRN MG: 2 INJECTION INTRAMUSCULAR; INTRAVENOUS at 16:38

## 2020-05-28 RX ADMIN — ONDANSETRON PRN MG: 2 INJECTION INTRAMUSCULAR; INTRAVENOUS at 09:52

## 2020-05-28 RX ADMIN — INSULIN LISPRO SCH UNITS: 100 INJECTION, SOLUTION INTRAVENOUS; SUBCUTANEOUS at 00:27

## 2020-05-28 RX ADMIN — DEXMEDETOMIDINE HYDROCHLORIDE PRN MLS/HR: 100 INJECTION, SOLUTION, CONCENTRATE INTRAVENOUS at 01:06

## 2020-05-28 RX ADMIN — FENTANYL CITRATE PRN MCG: 50 INJECTION INTRAMUSCULAR; INTRAVENOUS at 16:38

## 2020-05-28 RX ADMIN — FENTANYL CITRATE PRN MCG: 50 INJECTION INTRAMUSCULAR; INTRAVENOUS at 08:15

## 2020-05-28 RX ADMIN — PIPERACILLIN SODIUM AND TAZOBACTAM SODIUM SCH MLS/HR: 3; .375 INJECTION, POWDER, LYOPHILIZED, FOR SOLUTION INTRAVENOUS at 23:57

## 2020-05-28 RX ADMIN — PIPERACILLIN SODIUM AND TAZOBACTAM SODIUM SCH MLS/HR: 3; .375 INJECTION, POWDER, LYOPHILIZED, FOR SOLUTION INTRAVENOUS at 11:36

## 2020-05-28 RX ADMIN — PANTOPRAZOLE SODIUM SCH MG: 40 INJECTION, POWDER, FOR SOLUTION INTRAVENOUS at 08:15

## 2020-05-28 RX ADMIN — DEXMEDETOMIDINE HYDROCHLORIDE PRN MLS/HR: 100 INJECTION, SOLUTION, CONCENTRATE INTRAVENOUS at 11:35

## 2020-05-28 RX ADMIN — FUROSEMIDE SCH MG: 10 INJECTION, SOLUTION INTRAMUSCULAR; INTRAVENOUS at 14:31

## 2020-05-28 RX ADMIN — FENTANYL CITRATE PRN MCG: 50 INJECTION INTRAMUSCULAR; INTRAVENOUS at 21:04

## 2020-05-28 RX ADMIN — INSULIN LISPRO SCH UNITS: 100 INJECTION, SOLUTION INTRAVENOUS; SUBCUTANEOUS at 12:49

## 2020-05-28 RX ADMIN — FENTANYL CITRATE PRN MCG: 50 INJECTION INTRAMUSCULAR; INTRAVENOUS at 12:33

## 2020-05-28 RX ADMIN — FUROSEMIDE SCH MG: 10 INJECTION, SOLUTION INTRAMUSCULAR; INTRAVENOUS at 08:15

## 2020-05-28 NOTE — PDOC
SURGICAL PROGRESS NOTE


Subjective


Pt awake and alert


Vital Signs





Vital Signs








  Date Time  Temp Pulse Resp B/P (MAP) Pulse Ox O2 Delivery O2 Flow Rate FiO2


 


5/28/20 10:08  135 30 136/73 (94) 95 Room Air  


 


5/28/20 08:10 98.3       





 98.3       








I&O











Intake and Output 


 


 5/28/20





 07:00


 


Intake Total 2235.3 ml


 


Output Total 1983 ml


 


Balance 252.3 ml


 


 


 


IV Total 2235.3 ml


 


Output Urine Total 1828 ml


 


Drainage Total 155 ml








General:  Alert, Cooperative, No acute distress


Abdomen:  Soft, No tenderness


Labs





Laboratory Tests








Test


 5/26/20


17:52 5/26/20


23:28 5/27/20


06:07 5/27/20


12:28


 


Glucose (Fingerstick)


 182 mg/dL


(70-99) 161 mg/dL


(70-99) 142 mg/dL


(70-99) 177 mg/dL


(70-99)


 


Test


 5/27/20


18:06 5/28/20


00:20 5/28/20


05:27 5/28/20


05:30


 


Glucose (Fingerstick)


 149 mg/dL


(70-99) 170 mg/dL


(70-99) 142 mg/dL


(70-99) 





 


Sodium Level


 


 


 


 137 mmol/L


(136-145)


 


Potassium Level


 


 


 


 4.0 mmol/L


(3.5-5.1)


 


Chloride Level


 


 


 


 98 mmol/L


()


 


Carbon Dioxide Level


 


 


 


 30 mmol/L


(21-32)


 


Anion Gap    9 (6-14) 


 


Blood Urea Nitrogen


 


 


 


 31 mg/dL


(7-20)


 


Creatinine


 


 


 


 0.9 mg/dL


(0.6-1.0)


 


Estimated GFR


(Cockcroft-Gault) 


 


 


 66.5 





 


Glucose Level


 


 


 


 155 mg/dL


(70-99)


 


Calcium Level


 


 


 


 10.1 mg/dL


(8.5-10.1)


 


Triglycerides Level


 


 


 


 206 mg/dL


(0-150)








Laboratory Tests








Test


 5/27/20


12:28 5/27/20


18:06 5/28/20


00:20 5/28/20


05:27


 


Glucose (Fingerstick)


 177 mg/dL


(70-99) 149 mg/dL


(70-99) 170 mg/dL


(70-99) 142 mg/dL


(70-99)


 


Test


 5/28/20


05:30 


 


 





 


Sodium Level


 137 mmol/L


(136-145) 


 


 





 


Potassium Level


 4.0 mmol/L


(3.5-5.1) 


 


 





 


Chloride Level


 98 mmol/L


() 


 


 





 


Carbon Dioxide Level


 30 mmol/L


(21-32) 


 


 





 


Anion Gap 9 (6-14)    


 


Blood Urea Nitrogen


 31 mg/dL


(7-20) 


 


 





 


Creatinine


 0.9 mg/dL


(0.6-1.0) 


 


 





 


Estimated GFR


(Cockcroft-Gault) 66.5 


 


 


 





 


Glucose Level


 155 mg/dL


(70-99) 


 


 





 


Calcium Level


 10.1 mg/dL


(8.5-10.1) 


 


 





 


Triglycerides Level


 206 mg/dL


(0-150) 


 


 











I have reviewed the following


CT with some reduction in fluid collections


Problem List


Problems


Medical Problems:


(1) Acute pancreatitis


Status: Acute  





(2) Cholelithiasis


Status: Acute  








Assessment/Plan


s/p lap exploration


cont supportive care.  Expected prolonged recovery.  OK to work on d/c planning.











GAMAL ZHOU MD             May 28, 2020 10:37

## 2020-05-28 NOTE — NUR
SS following up with discharge planning. SS reviewed pt chart and discussed with pt RN. Pt 
remains on room air. Pt continues to have issues with anxiety. Pt not tolerating honey thick 
liquids. Pt on TPN and has ROBERT drains x3. Pt is on IV Zosyn and continues to work with PT/OT. 
SS attempted to contact pt's daughter with no success. Pt's daughter has not returned calls 
at this time. SS will continue to follow for discharge planning.

## 2020-05-28 NOTE — PDOC
PULMONARY PROGRESS NOTES


Subjective


Patient intubated on 3/23 , s/p trach 4/6,


Remains on TS 


Denies SOB or increased cough,


Vitals





Vital Signs








  Date Time  Temp Pulse Resp B/P (MAP) Pulse Ox O2 Delivery O2 Flow Rate FiO2


 


5/28/20 10:08  135 30 136/73 (94) 95 Room Air  


 


5/28/20 08:10 98.3       





 98.3       








ROS:  No Chest Pain, No Abdominal Pain, No Increase Cough


General:  Alert, No acute distress


HEENT:  Other (trach midline )


Lungs:  Other (Good air movement but having a lot of productive sputum from her 

tracheostomy site)


Cardiovascular:  S1, S2


Abdomen:  Soft, Non-tender


Neuro Exam:  Alert


Extremities:  Other (+3 generalized edema )


Skin:  Warm, Dry


Labs





Laboratory Tests








Test


 5/26/20


17:52 5/26/20


23:28 5/27/20


06:07 5/27/20


12:28


 


Glucose (Fingerstick)


 182 mg/dL


(70-99) 161 mg/dL


(70-99) 142 mg/dL


(70-99) 177 mg/dL


(70-99)


 


Test


 5/27/20


18:06 5/28/20


00:20 5/28/20


05:27 5/28/20


05:30


 


Glucose (Fingerstick)


 149 mg/dL


(70-99) 170 mg/dL


(70-99) 142 mg/dL


(70-99) 





 


Sodium Level


 


 


 


 137 mmol/L


(136-145)


 


Potassium Level


 


 


 


 4.0 mmol/L


(3.5-5.1)


 


Chloride Level


 


 


 


 98 mmol/L


()


 


Carbon Dioxide Level


 


 


 


 30 mmol/L


(21-32)


 


Anion Gap    9 (6-14) 


 


Blood Urea Nitrogen


 


 


 


 31 mg/dL


(7-20)


 


Creatinine


 


 


 


 0.9 mg/dL


(0.6-1.0)


 


Estimated GFR


(Cockcroft-Gault) 


 


 


 66.5 





 


Glucose Level


 


 


 


 155 mg/dL


(70-99)


 


Calcium Level


 


 


 


 10.1 mg/dL


(8.5-10.1)


 


Triglycerides Level


 


 


 


 206 mg/dL


(0-150)








Laboratory Tests








Test


 5/27/20


12:28 5/27/20


18:06 5/28/20


00:20 5/28/20


05:27


 


Glucose (Fingerstick)


 177 mg/dL


(70-99) 149 mg/dL


(70-99) 170 mg/dL


(70-99) 142 mg/dL


(70-99)


 


Test


 5/28/20


05:30 


 


 





 


Sodium Level


 137 mmol/L


(136-145) 


 


 





 


Potassium Level


 4.0 mmol/L


(3.5-5.1) 


 


 





 


Chloride Level


 98 mmol/L


() 


 


 





 


Carbon Dioxide Level


 30 mmol/L


(21-32) 


 


 





 


Anion Gap 9 (6-14)    


 


Blood Urea Nitrogen


 31 mg/dL


(7-20) 


 


 





 


Creatinine


 0.9 mg/dL


(0.6-1.0) 


 


 





 


Estimated GFR


(Cockcroft-Gault) 66.5 


 


 


 





 


Glucose Level


 155 mg/dL


(70-99) 


 


 





 


Calcium Level


 10.1 mg/dL


(8.5-10.1) 


 


 





 


Triglycerides Level


 206 mg/dL


(0-150) 


 


 











Medications





Active Scripts








 Medications  Dose


 Route/Sig


 Max Daily Dose Days Date Category


 


 Bisoprolol


 Fumarate 5 Mg


 Tablet  10 Mg


 PO DAILY


   3/16/20 Reported











Impression


.


IMPRESSION:


1.  Acute hypoxemic respiratory failure secondary to ARDS status post trach,


2.  Gallstone pancreatitis


3.  Severe metabolic acidosis.stable


4.  Acute kidney injury-stable, Off HD-- continue to improve 


5.  Acute gallstone pancreatitis.


6.  Hypoalbuminemia.


7.  Moderate persistent effusions, s/p left thora  5/12


8.  Fever-  Per ID, per surgery--resolved 


9.  Chronic anemia


10. Covid 19 testing negative


11. Moderate to large ascites-S/P paracentisis


12.S/P paracentisis with 4 liters removed on 4/15/20


13. S/P IR drain placement on 5/8/2020





Plan


.


1.Continue supplemental oxygen via trach shield--  PMV/capping as tolerated


2. s/p  thoracentesis, 5/12, 3 litres removed


3. Follow surgery recs-- S/P 3 drain placed in IR on 5/8/2020


4. Follow ID recs for ABX


5. Follow nephrology recs 


6. Continue TPN 


DVT/GI PPX: heparin SQ/ protonix 


D/W RN and RT, 





CODE:FULL











SHARYN SOLORZANO MD                 May 28, 2020 10:14

## 2020-05-28 NOTE — PDOC
Objective:


Objective:


D/w nurse - planned to try thickening liquids yesterday but didn't due to 

nausea, might try later today.


Vital Signs:





                                   Vital Signs








  Date Time  Temp Pulse Resp B/P (MAP) Pulse Ox O2 Delivery O2 Flow Rate FiO2


 


5/28/20 10:08  135 30 136/73 (94) 95 Room Air  


 


5/28/20 08:10 98.3       





 98.3       








Labs:





Laboratory Tests








Test


 5/27/20


12:28 5/27/20


18:06 5/28/20


00:20 5/28/20


05:27


 


Glucose (Fingerstick)


 177 mg/dL


(70-99) 149 mg/dL


(70-99) 170 mg/dL


(70-99) 142 mg/dL


(70-99)








Imaging:


Videoswallow 5/26


Impression:


Aspiration with thin liquid and nectar thick liquid barium. Moderate size 

lacunar residuals which are less when passing air valve was in place.


Initial Videoswallow Study Results


Pt demo'd SILENT aspiration of thin and nectar thick liquid via TSP w/trach 

capped. RN Lb present and provided deep suction to assist w/clearing 

aspirate. Thin liquid via TSP was also deeply penetrated and poss trace 

aspirated w/Passy [place] Valve in place. Swallow appeared safest for honey thick 

liquid w/Passy [place] Valve in place. With cap and PMV, puree had signif amts of 

vallecular residues associated that represented a risk of aspiration p.swallow 

and were not easily cleared w/liquid wash nor with dry swallows. 


IMPRESSIONS: Mod-severe pharyngeal dysphagia. Swallow appears safe for small 

amts of honey thick liquids for pleasure when pt is wearing the  PMValve. 

Reduced hyolaryngeal excursion contributed to both incomplete bolus clearance 

through pharynx and to incomplete airway closure during swallow. As a result, pt

experienced signif amts of puree vallecular residue p.swallow and penetration 

and aspiration during swallow w/thin and nectar consistencies (w/trach cap in 

place) Con't thin liquid aspiration risk was noted w/PMV in place. Pt's 

generalized weakness is likely the primary contributor to current dysphagia. 

Anticipate con't improvement w/additional progress in overall strength and 

medical condition.


RECOMMENDATIONS: Pending ok of surgery, initiate allowing HONEY THICK LIQUID via

CUP for pleasure with PASSY DELORES valve in place. Pt should NOT drink w/o valve 

in place. Written precautions provided to Lb RN to post HOB pending ok of 

diet w/ MD. Will f/u per POC. 





CT A/P 5/27


Impression: 


1. There are now 3 pigtail catheters as described, residual fluid collections as

stated mostly smaller other than larger collection of the inferior left 

paracolic gutter.


2. There are small, left greater than right pleural effusions, compressive 

atelectasis of the left lower lobe.


3. There is cholelithiasis.





PE:





GEN: NAD


LUNGS: trach/room air


HEART: tachycardic


ABD: soft, BS+


NEURO/PSYCH: sleeping, did not awaken during exam





A/P:


Gallstone pancreatitis, MOSF, nausea, dysphagia





--


Speech eval noted, ?try honey thick liquids later pending nausea.


Has Reglan ordered - good response to this in the past.





Hemodynamically unstable?:  No


Is patient in severe pain?:  No


Is NPO status required?:  No











RAGHAVENDRA MURCIA         May 28, 2020 10:55

## 2020-05-28 NOTE — PDOC
[facility] PROGRESS NOTE


Chief Complaint


Chief Complaint


Acute hypoxic Respiratory failure requiring mechanical ventilation (now 

extubated for several days but still with tracheostomy)


Tracheostomy


bilateral pleural effusions/pulm edema 


Sepsis


Severe Acute gallstone pancreatitis (not a surgical candidate at this time) with

necrosis


Acute kidney failure now requiring dialysis


Salpingitis


Gallstones (Calculus of gallbladder with acute cholecystitis without obstruc

tion)


HTN 


Leukocytosis 


Hypoxia


Uterine fibroid


Intractable pain


Intractable nausea


Covid 19 negative. 


Acute on chronic anemia 


EEG: No seizure activityFever  - better currently - intermittent could be from 

underlying pancreatitis blood cults 5/4 - neg so far


? Ileus with vomiting


Abd distention - U/S and CT reviewed s/p 0.4 L of opaque, debris-containing 

ascites was removed 5/6


Acute pancreatitis with persistent necrosis


- 4/27 status post ROBERT drain placement + C paropsilosis. s/p additional drains 

5/8


Anemia - S/p PRBCs


Cholelithiasis with thickening of the gallbladder wall.


Leucocytosis improving


JED, hyperkalemia, Metabolic acidosis off dialysis


Acute hypoxic resp failure ,bilateral pleural effusion and atelectasis


hypocalcemia 


Prediabetes


HTN


s/p trach


ESRD on HD


Hyperglycemia





History of Present Illness


History of Present Illness


5/28/2020


Patient seen and examined in the ICU


She has an NG that is clamped we are hoping to start some clear liquids but she 

looks quite ill


She is on IV TPN


Meropenem changed to IV Zosyn (agree)


She has [place] to bedside drainage


She is semi-sedated with Precedex


Chart reviewed


Discussed with RN


She remains critically ill











Seen bedside. Hb 8. She was just a bit hypoxic, had a mucous plug suctioned. 

Able to vocalize well with speaking valve, tells me we are being very hard on 

her and she would like to be drugged back to sleep.  She wants to wake up and 

feel better. On trach shield, 


T max 100.4. afebrile this a.m.





5/26/2020


Patient seen and examined in the ICU


She is up in the chair very frail trying to talk a little shaky


Discussed with RN


Chart reviewed








5/25/2020


Patient seen and examined in the ICU


Patient up in the chair


Having a severe coughing episode with a lot of phlegm coming out of her 

tracheostomy


Discussed with RN


Discussed with physical therapy


Chart reviewed











5/24,.    feels well,  has been out of room in wheelchair


no complaint,    still weak,   some with not wanting to wear her valve on trach


cont other,   may be able to work with speech tomorrow,    ketty OK to 

try, 








5/23,  anxiety is up today,   she dislikes the valve still,    


shower and outside today


.   





5/22 she doesnt want to wear her passy-kristan valve,  discussed str and plan with 

her.


speech following,  needs swallow study,   but needs to wear her valve longer,  


cont current


able to walk some, walker 





5/21  stronger,   better,   


we discussed better oral care


she would like to try swallow study,  wants to try to eat,    speech is 

following








5/20/2020 


She remains in the ICU


sitting up and working with OT,   getting better


if limit pain meds, may do better off the vent, 





Nurses trying to suction her,  that is also improved, 


Chart reviewed


 








5/19/2020


Patient seen and examined in the ICU


She had an episode yesterday of tachycardia and severe agitation we gave her 

some Ativan


After that she seemed to have stroke symptoms but now that the Ativan has wore 

off her stroke symptoms have resolved


 


 


She is on IV meropenem and daptomycin and micafungin


Chart reviewed


Discussed with RN


Patient is still critically ill


 


BRIEF OPERATIVE NOTE


Pre-Op Diagnosis


Pancreatitis with pseudocysts, suspected infection


Post-Op Diagnosis


same


Procedure Performed


CT abdominal Drains x 3


Surgeon


Hardy


Anesthesia Type:  Conscious Sedation


Findings


3 abdominal drains, 14F, with turbid pancreatic fluid and necrotic debris in 

each.


Complications


No immediate








5/9: Patient today somewhat restless and having bilious secretions from ET tube,

imaging studies ordered, discussed with consultant. Pretty poor prognosis, 

hopefully is not a fistula, poor surgical candidate. 





5/10: Imaging with no acute events, she seems more stable today compared to 

yesterday. Encouraged as much activity as possible patient at high risk for 

severe depression.





Vitals/I&O


Vitals/I&O:





                                   Vital Signs








  Date Time  Temp Pulse Resp B/P (MAP) Pulse Ox O2 Delivery O2 Flow Rate FiO2


 


5/28/20 11:37  165  157/86 (109) 92 Room Air  


 


5/28/20 10:08   30     


 


5/28/20 08:10 98.3       





 98.3       














                                    I & O   


 


 5/27/20 5/27/20 5/28/20





 15:00 23:00 07:00


 


Intake Total 50 ml 981 ml 1204.3 ml


 


Output Total 845 ml 658 ml 480 ml


 


Balance -795 ml 323 ml 724.3 ml











Physical Exam


Physical Exam:


GENERAL: Just got up in the chair having a lot of coughing


HEENT:  Oral cavity clear,  NGT


NECK:  Trach shield


LUNGS: A lot of coughing with productive sputum from the tracheostomy site


HEART:  S1, S2, regular 


ABDOMEN: mod distention, hypoactive BS, tender, + drains x 3


: [place] (4/14)


EXTREMITIES: Generalized edema, improving, no cyanosis, SCDs bilaterally 


SKIN: Warm and dry.  No generalized rash.  


CNS: Very weak 


RUE-PICC (4/30) clean


General:  Alert, Cooperative, No acute distress


Heart:  Regular rate, Normal S1, Normal S2, No murmurs, Gallops


Lungs:  Other (Good air movement but having a lot of productive sputum from her 

tracheostomy site)


Abdomen:  Soft, No tenderness


Extremities:  No clubbing, No cyanosis, No edema, Normal pulses, No 

tenderness/swelling


Skin:  Other (warm, dry)





Labs


Labs:





Laboratory Tests








Test


 5/27/20


12:28 5/27/20


18:06 5/28/20


00:20 5/28/20


05:27


 


Glucose (Fingerstick)


 177 mg/dL


(70-99) 149 mg/dL


(70-99) 170 mg/dL


(70-99) 142 mg/dL


(70-99)


 


Test


 5/28/20


05:30 


 


 





 


Sodium Level


 137 mmol/L


(136-145) 


 


 





 


Potassium Level


 4.0 mmol/L


(3.5-5.1) 


 


 





 


Chloride Level


 98 mmol/L


() 


 


 





 


Carbon Dioxide Level


 30 mmol/L


(21-32) 


 


 





 


Anion Gap 9 (6-14)    


 


Blood Urea Nitrogen


 31 mg/dL


(7-20) 


 


 





 


Creatinine


 0.9 mg/dL


(0.6-1.0) 


 


 





 


Estimated GFR


(Cockcroft-Gault) 66.5 


 


 


 





 


Glucose Level


 155 mg/dL


(70-99) 


 


 





 


Calcium Level


 10.1 mg/dL


(8.5-10.1) 


 


 





 


Triglycerides Level


 206 mg/dL


(0-150) 


 


 














Assessment and Plan


Assessmemt and Plan


Problems


Medical Problems:


(1) Acute pancreatitis


Status: Acute  





(2) Cholelithiasis


Status: Acute  





Acute hypoxic Respiratory failure requiring mechanical ventilation was initially

intubated on 3/23 was off for couple of days now back on


Severe gallstone pancreatitis (not a surgical candidate at this time) with 

necrosis


Tracheostomy


bilateral pleural effusions/pulm edema 


Severe sepsis


Acute kidney failure now requiring dialysis


Salpingitis


Gallstones (Calculus of gallbladder with acute cholecystitis without 

obstruction)


HTN 


Leukocytosis 


Hypoxia


Uterine fibroid


Intractable pain


Intractable nausea


Covid 19 negative. 


Acute on chronic anemia 


EEG: No seizure activityFever  - better currently - intermittent could be from 

underlying pancreatitis blood cults 5/4 - neg so far


? Ileus with vomiting


Abd distention - U/S and CT reviewed s/p 0.4 L of opaque, debris-containing 

ascites was removed 5/6


Acute pancreatitis with persistent necrosis


- 4/27 status post ROBERT drain placement + C paropsilosis. s/p additional drains 

5/8


Anemia - S/p PRBCs


Cholelithiasis with thickening of the gallbladder wall.


Leucocytosis improving


JED, hyperkalemia, Metabolic acidosis off dialysis


Acute hypoxic resp failure ,bilateral pleural effusion and atelectasis


hypocalcemia 


Prediabetes


HTN


s/p trach


ESRD on HD


Hyperglycemia








Plan


ICU monitoring


Wound care


Hold off on Ativan for now as she gets too weak with it


Humidified O2 via nasal cannula for now but we can use trach shield as backup


NG suctioning


Nebulizers


Continue IV antibiotics and micafungin


Sedation with Precedex PRN


TPN protocol


Continue [place] to bedside drainage


Tracheostomy care


Hope to eventually move towards decannulation (we have a speaking valve for now)


Trend labs


Appreciate subspecialist input


She is still critically ill


I am still very concerned about her long-term prognosis ! (she scored a 9 on 

Vandana criteria 3 weeks ago). 


Total time 32-minute





Comment


Review of Relevant


I have reviewed the following items josy (where applicable) has been applied.


Medications:





Current Medications








 Medications


  (Trade)  Dose


 Ordered  Sig/Yee


 Route


 PRN Reason  Start Time


 Stop Time Status Last Admin


Dose Admin


 


 Potassium


 Chloride 70 meq/


 Magnesium Sulfate


 20 meq/


 Multivitamins 10


 ml/Chromium/


 Copper/Manganese/


 Seleni/Zn 1 ml/


 Insulin Human


 Regular 15 unit/


 Total Parenteral


 Nutrition/Amino


 Acids/Dextrose/


 Fat Emulsion


 Intravenous  1,800 ml @ 


 75 mls/hr  TPN  CONT


 IV


   5/27/20 22:00


 5/28/20 21:59  5/27/20 22:03














Hemodynamically unstable?:  No


Is patient in severe pain?:  No


Is NPO status required?:  No











HECTOR MASON III DO           May 28, 2020 12:20

## 2020-05-28 NOTE — PDOC
Infectious Disease Note


Subjective:


Subjective


Patient calm


Remains on trach off vent


Afebrile last 24-hour


Discussed with RN, will has some respiratory secretions but improved





Vital Signs:


Vital Signs





Vital Signs








  Date Time  Temp Pulse Resp B/P (MAP) Pulse Ox O2 Delivery O2 Flow Rate FiO2


 


5/28/20 09:00  132 32 112/64 (80) 94 Room Air  


 


5/28/20 08:10 98.3       





 98.3       











Physical Exam:


PHYSICAL EXAM


GENERAL: Just got up in the chair having a lot of coughing


HEENT:  Oral cavity clear,  NGT


NECK:  Trach shield


LUNGS: A lot of coughing with productive sputum from the tracheostomy site


HEART:  S1, S2, regular 


ABDOMEN: mod distention, hypoactive BS, tender, + drains x 3


: [place] (4/14)


EXTREMITIES: Generalized edema, improving, no cyanosis, SCDs bilaterally 


SKIN: Warm and dry.  No generalized rash.  


CNS: Very weak 


RUE-PICC (4/30) clean





Medications:


Inpatient Meds:





Current Medications








 Medications


  (Trade)  Dose


 Ordered  Sig/Yee  Start Time


 Stop Time Status Last Admin


Dose Admin


 


 Acetaminophen


  (Tylenol Supp)  650 mg  PRN Q6HRS  PRN  3/24/20 10:30


    5/5/20 09:12


650 MG


 


 Acetaminophen


  (Tylenol)  650 mg  PRN Q6HRS  PRN  3/21/20 03:36


 5/13/20 10:25 DC 4/16/20 19:56


650 MG


 


 Albumin Human  100 ml @ 


 100 mls/hr  1X PRN  PRN  5/19/20 01:30


     





 


 Albuterol Sulfate


  (Ventolin Neb


 Soln)  2.5 mg  1X  ONCE  3/17/20 22:30


 3/17/20 22:31 DC 3/18/20 00:56


2.5 MG


 


 Alteplase,


 Recombinant


  (Cathflo For


 Central Catheter


 Clearance)  1 mg  1X  ONCE  4/24/20 10:45


 4/24/20 10:46 DC 4/24/20 11:44


1 MG


 


 Amino Acids/


 Glycerin/


 Electrolytes  1,000 ml @ 


 75 mls/hr  [id number]  4/20/20 21:15


   UNV  





 


 Artificial Tears


  (Artificial


 Tears)  1 drop  PRN Q15MIN  PRN  4/29/20 05:30


    5/24/20 11:15


1 DROP


 


 Atenolol


  (Tenormin)  100 mg  DAILY  3/17/20 09:00


 3/16/20 20:08 DC  





 


 Atropine Sulfate


  (ATROPINE 0.5mg


 SYRINGE)  0.5 mg  PRN Q5MIN  PRN  4/2/20 08:15


     





 


 Barium Sulfate


  (Varibar Thin


 Liquid Apple)  148 gm  1X  ONCE  5/26/20 11:45


 5/26/20 11:49 DC  





 


 Benzocaine


  (Hurricaine One)  1 spray  1X  ONCE  3/20/20 14:30


 3/20/20 14:31 DC 3/20/20 16:38


1 SPRAY


 


 Bisacodyl


  (Dulcolax Supp)  10 mg  STK-MED ONCE  4/27/20 10:59


 4/27/20 10:59 DC  





 


 Bumetanide


  (Bumex)  2 mg  DAILY  5/8/20 10:00


 5/18/20 17:15 DC 5/18/20 08:07


2 MG


 


 Bupivacaine HCl/


 Epinephrine Bitart


  (Sensorcain-Epi


 0.5%-1:209381 Mpf)  30 ml  STK-MED ONCE  4/27/20 10:58


 4/27/20 10:58 DC 4/27/20 12:01


7 ML


 


 Calcium Carbonate/


 Glycine


  (Tums)  500 mg  PRN [id number]  PRN  3/18/20 17:45


 5/13/20 10:25 DC  





 


 Calcium Chloride


 1000 mg/Sodium


 Chloride  110 ml @ 


 220 mls/hr  1X  ONCE  3/17/20 22:30


 3/17/20 22:59 DC 3/17/20 22:11


220 MLS/HR


 


 Calcium Chloride


 3000 mg/Sodium


 Chloride  1,030 ml @ 


 50 mls/hr  [id number]  3/19/20 08:00


 3/21/20 15:23 DC 3/21/20 02:17


50 MLS/HR


 


 Calcium Gluconate


  (Calcium


 Gluconate)  2,000 mg  1X  ONCE  3/19/20 02:15


 3/19/20 02:16 DC 3/19/20 02:19


2,000 MG


 


 Calcium Gluconate


 1000 mg/Sodium


 Chloride  110 ml @ 


 220 mls/hr  1X  ONCE  3/18/20 03:30


 3/18/20 03:59 DC 3/18/20 03:21


220 MLS/HR


 


 Calcium Gluconate


 2000 mg/Sodium


 Chloride  120 ml @ 


 220 mls/hr  1X  ONCE  3/18/20 07:30


 3/18/20 08:02 DC 3/18/20 09:05


220 MLS/HR


 


 Cefepime HCl


  (Maxipime)  2 gm  Q12HR  3/25/20 09:00


 4/8/20 09:58 DC 4/7/20 20:56


2 GM


 


 Cellulose


  (Surgicel


 Fibrillar 1x2)  1 each  STK-MED ONCE  4/6/20 11:00


 4/6/20 11:01 DC  





 


 Cellulose


  (Surgicel


 Hemostat 2x14)  1 each  STK-MED ONCE  4/27/20 10:58


 4/27/20 10:59 DC  





 


 Cellulose


  (Surgicel


 Hemostat 4x8)  1 each  STK-MED ONCE  4/27/20 10:58


 4/27/20 10:59 DC  





 


 Cyclobenzaprine


 HCl


  (Flexeril)  10 mg  PRN Q6HRS  PRN  4/30/20 10:45


     





 


 Daptomycin 430 mg/


 Sodium Chloride  50 ml @ 


 100 mls/hr  Q24H  4/25/20 13:00


 4/30/20 20:58 DC 4/30/20 13:00


100 MLS/HR


 


 Daptomycin 450 mg/


 Sodium Chloride  50 ml @ 


 100 mls/hr  Q24H  5/17/20 09:00


 5/21/20 08:30 DC 5/20/20 09:25


100 MLS/HR


 


 Daptomycin 485 mg/


 Sodium Chloride  50 ml @ 


 100 mls/hr  Q24H  5/4/20 11:00


 5/12/20 07:44 DC 5/11/20 13:10


100 MLS/HR


 


 Daptomycin 500 mg/


 Sodium Chloride  50 ml @ 


 100 mls/hr  Q48H  3/25/20 08:30


 4/10/20 10:07 DC 4/10/20 09:57


100 MLS/HR


 


 Dexamethasone


 Sodium Phosphate


  (Decadron)  4 mg  STK-MED ONCE  4/27/20 10:56


 4/27/20 10:57 DC  





 


 Dexmedetomidine


 HCl 400 mcg/


 Sodium Chloride  100 ml @ 0


 mls/hr  CONT  PRN  4/2/20 08:15


    5/28/20 01:06


5.4 MLS/HR


 


 Dextrose


  (Dextrose


 50%-Water Syringe)  12.5 gm  PRN Q15MIN  PRN  3/16/20 09:30


     





 


 Digoxin


  (Lanoxin)  125 mcg  1X  ONCE  3/19/20 18:00


 3/19/20 18:01 DC 3/19/20 17:10


125 MCG


 


 Diphenhydramine


 HCl


  (Benadryl)  25 mg  1X PRN  PRN  4/24/20 15:45


 4/25/20 15:44 DC  





 


 Duloxetine HCl


  (Cymbalta)  30 mg  DAILY  5/10/20 14:00


 5/13/20 10:25 DC 5/11/20 09:48


30 MG


 


 Enoxaparin Sodium


  (Lovenox 100mg


 Syringe)  100 mg  Q12HR  4/21/20 21:00


   UNV  





 


 Enoxaparin Sodium


  (Lovenox 40mg


 Syringe)  40 mg  Q24H  5/17/20 17:00


    5/27/20 17:21


40 MG


 


 Etomidate


  (Amidate)  8 mg  1X  ONCE  3/23/20 08:30


 3/23/20 08:31 DC 3/23/20 08:33


8 MG


 


 Fentanyl Citrate


  (Fentanyl 2ml


 Vial)  25 mcg  PRN Q4HRS  PRN  5/18/20 13:15


    5/28/20 08:15


25 MCG


 


 Fentanyl Citrate


  (Fentanyl 5ml


 Vial)  250 mcg  1X  ONCE  5/8/20 09:15


 5/8/20 09:16 DC 5/8/20 09:30


50 MCG


 


 Furosemide


  (Lasix)  40 mg  [id number]  5/19/20 14:00


    5/28/20 08:15


40 MG


 


 Haloperidol


 Lactate


  (Haldol Inj)  3 mg  1X  ONCE  5/4/20 14:30


 5/4/20 14:31 DC 5/4/20 14:37


3 MG


 


 Heparin Sodium


  (Porcine)


  (Hep Lock Adult)  500 unit  STK-MED ONCE  4/7/20 09:29


 4/7/20 09:30 DC  





 


 Heparin Sodium


  (Porcine)


  (Heparin Sodium)  5,000 unit  Q12HR  4/27/20 21:00


 5/7/20 09:59 DC 5/6/20 20:57


5,000 UNIT


 


 Heparin Sodium


  (Porcine) 1000


 unit/Sodium


 Chloride  1,001 ml @ 


 1,001 mls/hr  1X  ONCE  4/27/20 12:00


 4/27/20 12:59 DC  





 


 Hydromorphone HCl


  (Dilaudid


 Standard PCA)  12 mg  STK-MED ONCE  5/1/20 15:50


 5/12/20 11:24 DC  





 


 Hydromorphone HCl


  (Dilaudid)  1 mg  PRN Q4HRS  PRN  5/4/20 19:00


 5/18/20 17:10 DC 5/18/20 06:25


1 MG


 


 Info


  (CONTRAST GIVEN


 -- Rx MONITORING)  1 each  PRN DAILY  PRN  3/30/20 11:45


 4/1/20 11:44 DC  





 


 Info


  (Icu Electrolyte


 Protocol)  1 ea  CONT PRN  PRN  3/29/20 13:15


     





 


 Info


  (PHARMACY


 MONITORING -- do


 not chart)  1 each  PRN DAILY  PRN  4/24/20 15:45


 5/26/20 14:14 DC  





 


 Info


  (Tpn Per


 Pharmacy)  1 each  PRN DAILY  PRN  3/18/20 12:30


   UNV  





 


 Insulin Human


 Lispro


  (HumaLOG)  0-9 UNITS  Q6HRS  3/16/20 09:30


    5/28/20 00:27


4 UNITS


 


 Insulin Human


 Regular


  (HumuLIN R VIAL)  5 unit  1X  ONCE  3/17/20 22:30


 3/17/20 22:31 DC 3/17/20 22:14


5 UNIT


 


 Iohexol


  (Omnipaque 240


 Mg/ml)  30 ml  1X  ONCE  3/30/20 11:30


 3/30/20 11:33 DC 3/30/20 11:30


30 ML


 


 Iohexol


  (Omnipaque 300


 Mg/ml)  50 ml  STK-MED ONCE  4/27/20 10:58


 4/27/20 10:59 DC  





 


 Iohexol


  (Omnipaque 350


 Mg/ml)  90 ml  1X  ONCE  3/16/20 03:30


 3/16/20 03:31 DC 3/16/20 03:25


90 ML


 


 Ketorolac


 Tromethamine


  (Toradol 30mg


 Vial)  30 mg  1X  ONCE  3/16/20 03:00


 3/16/20 03:01 DC 3/16/20 02:54


30 MG


 


 Lidocaine HCl


  (Buffered


 Lidocaine 1%)  6 ml  1X  ONCE  5/12/20 14:15


 5/12/20 14:16 DC 5/12/20 14:15


3 ML


 


 Lidocaine HCl


  (Glydo


  (Lidocaine) Jelly)  1 thomas  1X  ONCE  3/20/20 14:30


 3/20/20 14:31 DC 3/20/20 16:38


1 THOMAS


 


 Lidocaine HCl


  (Xylocaine-Mpf


 1% 2ml Vial)  2 ml  PRN 1X  PRN  4/27/20 07:00


 4/28/20 06:59 DC  





 


 Linezolid/Dextrose  300 ml @ 


 300 mls/hr  Q12HR  5/17/20 09:00


 5/20/20 08:11 DC 5/19/20 21:08


300 MLS/HR


 


 Lorazepam


  (Ativan Inj)  2 mg  PRN Q4HRS  PRN  5/17/20 19:15


    5/26/20 23:23


2 MG


 


 Magnesium Sulfate  50 ml @ 25


 mls/hr  1X  ONCE  5/10/20 09:00


 5/10/20 10:59 DC 5/10/20 10:44


25 MLS/HR


 


 Meropenem 1 gm/


 Sodium Chloride  100 ml @ 


 200 mls/hr  Q8HRS  4/28/20 14:00


 5/22/20 09:31 DC 5/22/20 05:53


200 MLS/HR


 


 Meropenem 500 mg/


 Sodium Chloride  50 ml @ 


 100 mls/hr  Q6HRS  5/25/20 18:00


 5/27/20 09:59 DC 5/27/20 06:02


100 MLS/HR


 


 Metoclopramide HCl


  (Reglan Vial)  10 mg  PRN Q3HRS  PRN  5/9/20 16:45


    5/14/20 04:25


10 MG


 


 Metoprolol


 Tartrate


  (Lopressor Vial)  5 mg  1X  ONCE  5/17/20 15:15


 5/17/20 15:16 DC 5/17/20 15:31


5 MG


 


 Metronidazole  100 ml @ 


 100 mls/hr  Q8HRS  4/14/20 10:00


 4/21/20 08:10 DC 4/21/20 06:04


100 MLS/HR


 


 Micafungin Sodium


 100 mg/Dextrose  100 ml @ 


 100 mls/hr  Q24H  5/4/20 11:00


 5/27/20 09:59 DC 5/26/20 12:17


100 MLS/HR


 


 Midazolam HCl


  (Versed)  5 mg  1X  ONCE  5/8/20 09:15


 5/8/20 09:16 DC 5/8/20 09:30


1 MG


 


 Midazolam HCl 100


 mg/Sodium Chloride  100 ml @ 7


 mls/hr  CONT  PRN  3/28/20 16:00


    4/8/20 15:35


7 MLS/HR


 


 Midazolam HCl 50


 mg/Sodium Chloride  50 ml @ 0


 mls/hr  CONT  PRN  3/23/20 08:15


 3/28/20 15:59 DC 3/26/20 22:39


7 MLS/HR


 


 Morphine Sulfate


  (Morphine


 Sulfate)  2 mg  PRN Q2HR  PRN  3/16/20 05:00


 3/17/20 14:15 DC 3/17/20 12:26


2 MG


 


 Multi-Ingred


 Cream/Lotion/Oil/


 Oint


  (Artificial


 Tears Eye


 Ointment)  1 thomas  PRN Q1HR  PRN  3/25/20 17:30


    4/13/20 08:19


1 THOMAS


 


 Naloxone HCl


  (Narcan)  0.4 mg  PRN Q2MIN  PRN  4/27/20 14:30


   UNV  





 


 Norepinephrine


 Bitartrate 8 mg/


 Dextrose  258 ml @ 


 17.299 mls/


 hr  CONT  PRN  3/17/20 15:30


 4/17/20 09:19 DC 4/14/20 12:48


20.9 MLS/HR


 


 Ondansetron HCl


  (Zofran)  4 mg  STK-MED ONCE  4/27/20 10:56


 4/27/20 10:57 DC  





 


 Pantoprazole


 Sodium


  (PROTONIX VIAL


 for IV PUSH)  40 mg  DAILYAC  3/16/20 11:30


    5/28/20 08:15


40 MG


 


 Phenylephrine HCl


  (PHENYLEPHRINE


 in 0.9% NACL PF)  1 mg  STK-MED ONCE  4/27/20 12:34


 4/27/20 12:34 DC  





 


 Piperacillin Sod/


 Tazobactam Sod


 3.375 gm/Sodium


 Chloride  50 ml @ 


 100 mls/hr  Q6HRS  5/27/20 12:00


    5/28/20 05:31


100 MLS/HR


 


 Piperacillin Sod/


 Tazobactam Sod


 4.5 gm/Sodium


 Chloride  100 ml @ 


 200 mls/hr  1X  ONCE  3/16/20 06:00


 3/16/20 06:29 DC 3/16/20 05:44


200 MLS/HR


 


 Potassium


 Chloride 110 meq/


 Magnesium Sulfate


 20 meq/


 Multivitamins 10


 ml/Chromium/


 Copper/Manganese/


 Seleni/Zn 1 ml/


 Insulin Human


 Regular 15 unit/


 Total Parenteral


 Nutrition/Amino


 Acids/Dextrose/


 Fat Emulsion


 Intravenous  1,800 ml @ 


 75 mls/hr  TPN  CONT  5/24/20 22:00


 5/25/20 21:59 DC 5/24/20 22:48


75 MLS/HR


 


 Potassium


 Chloride 15 meq/


 Bicarbonate


 Dialysis Soln w/


 out KCl  5,007.5 ml


  @ 1,000 mls/


 hr  Q5H1M  3/29/20 20:00


 4/2/20 13:08 DC 4/1/20 18:14


1,000 MLS/HR


 


 Potassium


 Chloride 20 meq/


 Bicarbonate


 Dialysis Soln w/


 out KCl  5,010 ml @ 


 1,000 mls/hr  Q5H1M  3/25/20 16:00


 3/29/20 19:59 DC 3/29/20 14:54


1,000 MLS/HR


 


 Potassium


 Chloride 70 meq/


 Magnesium Sulfate


 20 meq/


 Multivitamins 10


 ml/Chromium/


 Copper/Manganese/


 Seleni/Zn 1 ml/


 Insulin Human


 Regular 15 unit/


 Total Parenteral


 Nutrition/Amino


 Acids/Dextrose/


 Fat Emulsion


 Intravenous  1,800 ml @ 


 75 mls/hr  TPN  CONT  5/27/20 22:00


 5/28/20 21:59  5/27/20 22:03


75 MLS/HR


 


 Potassium


 Chloride 75 meq/


 Magnesium Sulfate


 15 meq/


 Multivitamins 10


 ml/Chromium/


 Copper/Manganese/


 Seleni/Zn 0.5 ml/


 Insulin Human


 Regular 15 unit/


 Total Parenteral


 Nutrition/Amino


 Acids/Dextrose/


 Fat Emulsion


 Intravenous  1,920 ml @ 


 80 mls/hr  TPN  CONT  5/9/20 22:00


 5/10/20 21:59 DC 5/9/20 22:41


80 MLS/HR


 


 Potassium


 Chloride 75 meq/


 Magnesium Sulfate


 15 meq/Calcium


 Gluconate 8 meq/


 Multivitamins 10


 ml/Chromium/


 Copper/Manganese/


 Seleni/Zn 0.5 ml/


 Insulin Human


 Regular 15 unit/


 Total Parenteral


 Nutrition/Amino


 Acids/Dextrose/


 Fat Emulsion


 Intravenous  1,920 ml @ 


 80 mls/hr  TPN  CONT  5/7/20 22:00


 5/8/20 21:59 DC 5/7/20 22:28


80 MLS/HR


 


 Potassium


 Chloride 75 meq/


 Magnesium Sulfate


 15 meq/Calcium


 Gluconate 8 meq/


 Multivitamins 10


 ml/Chromium/


 Copper/Manganese/


 Seleni/Zn 0.5 ml/


 Insulin Human


 Regular 20 unit/


 Total Parenteral


 Nutrition/Amino


 Acids/Dextrose/


 Fat Emulsion


 Intravenous  1,920 ml @ 


 80 mls/hr  TPN  CONT  5/6/20 22:00


 5/7/20 21:59 DC 5/6/20 22:00


80 MLS/HR


 


 Potassium


 Chloride 75 meq/


 Magnesium Sulfate


 15 meq/Calcium


 Gluconate 8 meq/


 Multivitamins 10


 ml/Chromium/


 Copper/Manganese/


 Seleni/Zn 0.5 ml/


 Insulin Human


 Regular 25 unit/


 Total Parenteral


 Nutrition/Amino


 Acids/Dextrose/


 Fat Emulsion


 Intravenous  1,920 ml @ 


 80 mls/hr  TPN  CONT  5/4/20 22:00


 5/5/20 21:59 DC 5/4/20 23:08


80 MLS/HR


 


 Potassium


 Chloride 75 meq/


 Magnesium Sulfate


 20 meq/Calcium


 Gluconate 10 meq/


 Multivitamins 10


 ml/Chromium/


 Copper/Manganese/


 Seleni/Zn 0.5 ml/


 Insulin Human


 Regular 25 unit/


 Total Parenteral


 Nutrition/Amino


 Acids/Dextrose/


 Fat Emulsion


 Intravenous  1,920 ml @ 


 80 mls/hr  TPN  CONT  5/3/20 22:00


 5/4/20 21:59 DC 5/3/20 22:04


80 MLS/HR


 


 Potassium


 Chloride 75 meq/


 Magnesium Sulfate


 20 meq/Calcium


 Gluconate 10 meq/


 Multivitamins 10


 ml/Chromium/


 Copper/Manganese/


 Seleni/Zn 0.5 ml/


 Insulin Human


 Regular 30 unit/


 Total Parenteral


 Nutrition/Amino


 Acids/Dextrose/


 Fat Emulsion


 Intravenous  1,920 ml @ 


 80 mls/hr  TPN  CONT  5/2/20 22:00


 5/3/20 22:00 DC 5/2/20 21:51


80 MLS/HR


 


 Potassium


 Chloride 80 meq/


 Magnesium Sulfate


 20 meq/


 Multivitamins 10


 ml/Chromium/


 Copper/Manganese/


 Seleni/Zn 0.5 ml/


 Insulin Human


 Regular 15 unit/


 Total Parenteral


 Nutrition/Amino


 Acids/Dextrose/


 Fat Emulsion


 Intravenous  1,920 ml @ 


 80 mls/hr  TPN  CONT  5/12/20 22:00


 5/13/20 21:59 DC 5/12/20 21:40


80 MLS/HR


 


 Potassium


 Chloride 80 meq/


 Magnesium Sulfate


 20 meq/


 Multivitamins 10


 ml/Chromium/


 Copper/Manganese/


 Seleni/Zn 1 ml/


 Insulin Human


 Regular 15 unit/


 Total Parenteral


 Nutrition/Amino


 Acids/Dextrose/


 Fat Emulsion


 Intravenous  1,920 ml @ 


 80 mls/hr  TPN  CONT  5/13/20 22:00


 5/14/20 21:59 DC 5/13/20 22:04


80 MLS/HR


 


 Potassium


 Chloride 90 meq/


 Magnesium Sulfate


 20 meq/


 Multivitamins 10


 ml/Chromium/


 Copper/Manganese/


 Seleni/Zn 1 ml/


 Insulin Human


 Regular 15 unit/


 Total Parenteral


 Nutrition/Amino


 Acids/Dextrose/


 Fat Emulsion


 Intravenous  1,800 ml @ 


 75 mls/hr  TPN  CONT  5/20/20 22:00


 5/21/20 21:59 DC 5/20/20 22:28


75 MLS/HR


 


 Potassium


 Chloride/Water  100 ml @ 


 100 mls/hr  1X  ONCE  5/14/20 11:00


 5/14/20 11:59 DC 5/14/20 11:34


100 MLS/HR


 


 Potassium


 Phosphate 20 mmol/


 Sodium Chloride  106.6667


 ml @ 


 51.667 m...  1X  ONCE  3/25/20 13:00


 3/25/20 15:03 DC 3/25/20 12:51


51.667 MLS/HR


 


 Potassium Acetate


 30 meq/Magnesium


 Sulfate 20 meq/


 Calcium Gluconate


 10 meq/


 Multivitamins 10


 ml/Chromium/


 Copper/Manganese/


 Seleni/Zn 0.5 ml/


 Insulin Human


 Regular 30 unit/


 Potassium


 Chloride 30 meq/


 Total Parenteral


 Nutrition/Amino


 Acids/Dextrose/


 Fat Emulsion


 Intravenous  1,920 ml @ 


 80 mls/hr  TPN  CONT  5/1/20 22:00


 5/2/20 21:59 DC 5/1/20 22:34


80 MLS/HR


 


 Potassium Acetate


 55 meq/Magnesium


 Sulfate 20 meq/


 Calcium Gluconate


 10 meq/


 Multivitamins 10


 ml/Chromium/


 Copper/Manganese/


 Seleni/Zn 0.5 ml/


 Insulin Human


 Regular 30 unit/


 Total Parenteral


 Nutrition/Amino


 Acids/Dextrose/


 Fat Emulsion


 Intravenous  1,920 ml @ 


 80 mls/hr  TPN  CONT  4/30/20 22:00


 5/1/20 21:59 DC 5/1/20 01:00


80 MLS/HR


 


 Potassium Acetate


 55 meq/Magnesium


 Sulfate 20 meq/


 Calcium Gluconate


 10 meq/


 Multivitamins 10


 ml/Chromium/


 Copper/Manganese/


 Seleni/Zn 0.5 ml/


 Insulin Human


 Regular 35 unit/


 Total Parenteral


 Nutrition/Amino


 Acids/Dextrose/


 Fat Emulsion


 Intravenous  1,920 ml @ 


 80 mls/hr  TPN  CONT  4/28/20 22:00


 4/29/20 21:59 DC 4/28/20 22:02


80 MLS/HR


 


 Potassium Acetate


 65 meq/Magnesium


 Sulfate 20 meq/


 Calcium Gluconate


 10 meq/


 Multivitamins 10


 ml/Chromium/


 Copper/Manganese/


 Seleni/Zn 0.5 ml/


 Insulin Human


 Regular 30 unit/


 Total Parenteral


 Nutrition/Amino


 Acids/Dextrose/


 Fat Emulsion


 Intravenous  1,920 ml @ 


 80 mls/hr  TPN  CONT  4/29/20 22:00


 4/30/20 21:59 DC 4/29/20 22:22


80 MLS/HR


 


 Prochlorperazine


 Edisylate


  (Compazine)  5 mg  PACU PRN  PRN  4/27/20 07:00


 4/28/20 06:59 DC  





 


 Propofol  20 ml @ As


 Directed  STK-MED ONCE  4/27/20 12:26


 4/27/20 12:27 DC  





 


 Ringer's Solution  1,000 ml @ 


 30 mls/hr  Q24H  4/27/20 07:00


 4/27/20 18:59 DC  





 


 Rocuronium Bromide


  (Zemuron)  50 mg  STK-MED ONCE  4/27/20 10:56


 4/27/20 10:57 DC  





 


 Saliva Substitute


  (Biotene


 Moisturizing


 Mouth)  2 spray  PRN Q15MIN  PRN  5/21/20 11:00


     





 


 Sevoflurane


  (Ultane)  60 ml  STK-MED ONCE  4/27/20 12:26


 4/27/20 12:27 DC  





 


 Sodium


 Bicarbonate 50


 meq/Sodium


 Chloride  1,050 ml @ 


 75 mls/hr  Q14H  3/18/20 07:30


 3/23/20 10:28 DC 3/22/20 21:10


75 MLS/HR


 


 Sodium Acetate 50


 meq/Potassium


 Acetate 55 meq/


 Magnesium Sulfate


 20 meq/Calcium


 Gluconate 10 meq/


 Multivitamins 10


 ml/Chromium/


 Copper/Manganese/


 Seleni/Zn 0.5 ml/


 Insulin Human


 Regular 35 unit/


 Total Parenteral


 Nutrition/Amino


 Acids/Dextrose/


 Fat Emulsion


 Intravenous  1,800 ml @ 


 75 mls/hr  TPN  CONT  4/25/20 22:00


 4/26/20 21:59 DC 4/25/20 22:03


75 MLS/HR


 


 Sodium Chloride  1,000 ml @ 


 25 mls/hr  Q24H  4/27/20 14:30


   UNV  





 


 Sodium Chloride


  (Normal Saline


 Flush)  3 ml  QSHIFT  PRN  4/27/20 13:45


     





 


 Sodium Chloride


 90 meq/Calcium


 Gluconate 10 meq/


 Multivitamins 10


 ml/Chromium/


 Copper/Manganese/


 Seleni/Zn 0.5 ml/


 Total Parenteral


 Nutrition/Amino


 Acids/Dextrose/


 Fat Emulsion


 Intravenous  1,512 ml @ 


 63 mls/hr  TPN  CONT  3/18/20 22:00


 3/19/20 21:59 DC 3/18/20 22:06


63 MLS/HR


 


 Sodium Chloride


 90 meq/Calcium


 Gluconate 10 meq/


 Multivitamins 10


 ml/Chromium/


 Copper/Manganese/


 Seleni/Zn 1 ml/


 Total Parenteral


 Nutrition/Amino


 Acids/Dextrose/


 Fat Emulsion


 Intravenous  55.005 ml


  @ 2.292


 mls/hr  TPN  CONT  3/18/20 22:00


 3/18/20 12:33 DC  





 


 Sodium Chloride


 90 meq/Magnesium


 Sulfate 10 meq/


 Calcium Gluconate


 20 meq/


 Multivitamins 10


 ml/Chromium/


 Copper/Manganese/


 Seleni/Zn 0.5 ml/


 Total Parenteral


 Nutrition/Amino


 Acids/Dextrose/


 Fat Emulsion


 Intravenous  1,512 ml @ 


 63 mls/hr  TPN  CONT  3/19/20 22:00


 3/20/20 21:59 DC 3/19/20 22:25


63 MLS/HR


 


 Sodium Chloride


 90 meq/Magnesium


 Sulfate 12 meq/


 Calcium Gluconate


 15 meq/


 Multivitamins 10


 ml/Chromium/


 Copper/Manganese/


 Seleni/Zn 0.5 ml/


 Insulin Human


 Regular 25 unit/


 Total Parenteral


 Nutrition/Amino


 Acids/Dextrose/


 Fat Emulsion


 Intravenous  1,400 ml @ 


 58.333 mls/


 hr  TPN  CONT  4/8/20 22:00


 4/9/20 21:59 DC 4/8/20 21:41


58.333 MLS/HR


 


 Sodium Chloride


 90 meq/Potassium


 Chloride 15 meq/


 Magnesium Sulfate


 12 meq/Calcium


 Gluconate 15 meq/


 Multivitamins 10


 ml/Chromium/


 Copper/Manganese/


 Seleni/Zn 0.5 ml/


 Insulin Human


 Regular 25 unit/


 Total Parenteral


 Nutrition/Amino


 Acids/Dextrose/


 Fat Emulsion


 Intravenous  1,400 ml @ 


 58.333 mls/


 hr  TPN  CONT  4/7/20 22:00


 4/8/20 21:59 DC 4/7/20 22:13


58.333 MLS/HR


 


 Sodium Chloride


 90 meq/Potassium


 Chloride 15 meq/


 Potassium


 Phosphate 10 mmol/


 Magnesium Sulfate


 8 meq/Calcium


 Gluconate 15 meq/


 Multivitamins 10


 ml/Chromium/


 Copper/Manganese/


 Seleni/Zn 0.5 ml/


 Insulin Human


 Regular 25 unit/


 Total Parenteral


 Nutrition/Amino


 Acids/Dextrose/


 Fat Emulsion


 Intravenous  1,400 ml @ 


 58.333 mls/


 hr  TPN  CONT  4/5/20 22:00


 4/6/20 21:59 DC 4/5/20 21:20


58.333 MLS/HR


 


 Sodium Chloride


 90 meq/Potassium


 Chloride 15 meq/


 Potassium


 Phosphate 10 mmol/


 Magnesium Sulfate


 10 meq/Calcium


 Gluconate 20 meq/


 Multivitamins 10


 ml/Chromium/


 Copper/Manganese/


 Seleni/Zn 0.5 ml/


 Total Parenteral


 Nutrition/Amino


 Acids/Dextrose/


 Fat Emulsion


 Intravenous  1,400 ml @ 


 58.333 mls/


 hr  TPN  CONT  3/23/20 22:00


 3/24/20 21:59 DC 3/23/20 21:42


58.333 MLS/HR


 


 Sodium Chloride


 90 meq/Potassium


 Chloride 15 meq/


 Potassium


 Phosphate 10 mmol/


 Magnesium Sulfate


 12 meq/Calcium


 Gluconate 15 meq/


 Multivitamins 10


 ml/Chromium/


 Copper/Manganese/


 Seleni/Zn 0.5 ml/


 Insulin Human


 Regular 25 unit/


 Total Parenteral


 Nutrition/Amino


 Acids/Dextrose/


 Fat Emulsion


 Intravenous  1,400 ml @ 


 58.333 mls/


 hr  TPN  CONT  4/6/20 22:00


 4/7/20 21:59 DC 4/6/20 22:24


58.333 MLS/HR


 


 Sodium Chloride


 90 meq/Potassium


 Chloride 15 meq/


 Potassium


 Phosphate 15 mmol/


 Magnesium Sulfate


 10 meq/Calcium


 Gluconate 15 meq/


 Multivitamins 10


 ml/Chromium/


 Copper/Manganese/


 Seleni/Zn 0.5 ml/


 Total Parenteral


 Nutrition/Amino


 Acids/Dextrose/


 Fat Emulsion


 Intravenous  1,400 ml @ 


 58.333 mls/


 hr  TPN  CONT  3/24/20 22:00


 3/25/20 21:59 DC 3/24/20 22:17


58.333 MLS/HR


 


 Sodium Chloride


 90 meq/Potassium


 Chloride 15 meq/


 Potassium


 Phosphate 15 mmol/


 Magnesium Sulfate


 10 meq/Calcium


 Gluconate 20 meq/


 Multivitamins 10


 ml/Chromium/


 Copper/Manganese/


 Seleni/Zn 0.5 ml/


 Total Parenteral


 Nutrition/Amino


 Acids/Dextrose/


 Fat Emulsion


 Intravenous  1,200 ml @ 


 50 mls/hr  TPN  CONT  3/22/20 22:00


 3/22/20 14:17 DC  





 


 Sodium Chloride


 90 meq/Potassium


 Chloride 15 meq/


 Potassium


 Phosphate 18 mmol/


 Magnesium Sulfate


 8 meq/Calcium


 Gluconate 15 meq/


 Multivitamins 10


 ml/Chromium/


 Copper/Manganese/


 Seleni/Zn 0.5 ml/


 Insulin Human


 Regular 10 unit/


 Total Parenteral


 Nutrition/Amino


 Acids/Dextrose/


 Fat Emulsion


 Intravenous  1,400 ml @ 


 58.333 mls/


 hr  TPN  CONT  3/27/20 22:00


 3/28/20 21:59 DC 3/27/20 21:43


58.333 MLS/HR


 


 Sodium Chloride


 90 meq/Potassium


 Chloride 15 meq/


 Potassium


 Phosphate 18 mmol/


 Magnesium Sulfate


 8 meq/Calcium


 Gluconate 15 meq/


 Multivitamins 10


 ml/Chromium/


 Copper/Manganese/


 Seleni/Zn 0.5 ml/


 Insulin Human


 Regular 15 unit/


 Total Parenteral


 Nutrition/Amino


 Acids/Dextrose/


 Fat Emulsion


 Intravenous  1,400 ml @ 


 58.333 mls/


 hr  TPN  CONT  3/30/20 22:00


 3/31/20 21:59 DC 3/30/20 21:47


58.333 MLS/HR


 


 Sodium Chloride


 90 meq/Potassium


 Chloride 15 meq/


 Potassium


 Phosphate 18 mmol/


 Magnesium Sulfate


 8 meq/Calcium


 Gluconate 15 meq/


 Multivitamins 10


 ml/Chromium/


 Copper/Manganese/


 Seleni/Zn 0.5 ml/


 Insulin Human


 Regular 20 unit/


 Total Parenteral


 Nutrition/Amino


 Acids/Dextrose/


 Fat Emulsion


 Intravenous  1,400 ml @ 


 58.333 mls/


 hr  TPN  CONT  4/2/20 22:00


 4/3/20 21:59 DC 4/2/20 22:45


58.333 MLS/HR


 


 Sodium Chloride


 90 meq/Potassium


 Chloride 15 meq/


 Potassium


 Phosphate 18 mmol/


 Magnesium Sulfate


 8 meq/Calcium


 Gluconate 15 meq/


 Multivitamins 10


 ml/Chromium/


 Copper/Manganese/


 Seleni/Zn 0.5 ml/


 Total Parenteral


 Nutrition/Amino


 Acids/Dextrose/


 Fat Emulsion


 Intravenous  1,400 ml @ 


 58.333 mls/


 hr  TPN  CONT  3/26/20 22:00


 3/27/20 21:59 DC 3/26/20 22:00


58.333 MLS/HR


 


 Sodium Chloride


 90 meq/Potassium


 Phosphate 15 mmol/


 Magnesium Sulfate


 12 meq/Calcium


 Gluconate 15 meq/


 Multivitamins 10


 ml/Chromium/


 Copper/Manganese/


 Seleni/Zn 0.5 ml/


 Insulin Human


 Regular 30 unit/


 Total Parenteral


 Nutrition/Amino


 Acids/Dextrose/


 Fat Emulsion


 Intravenous  1,400 ml @ 


 58.333 mls/


 hr  TPN  CONT  4/10/20 22:00


 4/11/20 21:59 DC 4/10/20 21:49


58.333 MLS/HR


 


 Sodium Chloride


 90 meq/Potassium


 Phosphate 15 mmol/


 Magnesium Sulfate


 12 meq/Calcium


 Gluconate 15 meq/


 Multivitamins 10


 ml/Chromium/


 Copper/Manganese/


 Seleni/Zn 0.5 ml/


 Insulin Human


 Regular 40 unit/


 Total Parenteral


 Nutrition/Amino


 Acids/Dextrose/


 Fat Emulsion


 Intravenous  1,400 ml @ 


 58.333 mls/


 hr  TPN  CONT  4/11/20 22:00


 4/12/20 21:59 DC 4/11/20 21:21


58.333 MLS/HR


 


 Sodium Chloride


 90 meq/Potassium


 Phosphate 19 mmol/


 Magnesium Sulfate


 12 meq/Calcium


 Gluconate 15 meq/


 Multivitamins 10


 ml/Chromium/


 Copper/Manganese/


 Seleni/Zn 0.5 ml/


 Insulin Human


 Regular 40 unit/


 Total Parenteral


 Nutrition/Amino


 Acids/Dextrose/


 Fat Emulsion


 Intravenous  1,400 ml @ 


 58.333 mls/


 hr  TPN  CONT  4/12/20 22:00


 4/13/20 21:59 DC 4/12/20 21:54


58.333 MLS/HR


 


 Sodium Chloride


 90 meq/Potassium


 Phosphate 5 mmol/


 Magnesium Sulfate


 12 meq/Calcium


 Gluconate 15 meq/


 Multivitamins 10


 ml/Chromium/


 Copper/Manganese/


 Seleni/Zn 0.5 ml/


 Insulin Human


 Regular 30 unit/


 Total Parenteral


 Nutrition/Amino


 Acids/Dextrose/


 Fat Emulsion


 Intravenous  1,400 ml @ 


 58.333 mls/


 hr  TPN  CONT  4/9/20 22:00


 4/10/20 21:59 DC 4/9/20 22:08


58.333 MLS/HR


 


 Sodium Chloride


 100 meq/Potassium


 Chloride 40 meq/


 Magnesium Sulfate


 15 meq/Calcium


 Gluconate 15 meq/


 Multivitamins 10


 ml/Chromium/


 Copper/Manganese/


 Seleni/Zn 0.5 ml/


 Insulin Human


 Regular 35 unit/


 Total Parenteral


 Nutrition/Amino


 Acids/Dextrose/


 Fat Emulsion


 Intravenous  1,400 ml @ 


 58.333 mls/


 hr  TPN  CONT  4/19/20 22:00


 4/20/20 21:59 DC 4/19/20 22:46


58.333 MLS/HR


 


 Sodium Chloride


 100 meq/Potassium


 Chloride 40 meq/


 Magnesium Sulfate


 20 meq/Calcium


 Gluconate 10 meq/


 Multivitamins 10


 ml/Chromium/


 Copper/Manganese/


 Seleni/Zn 0.5 ml/


 Insulin Human


 Regular 35 unit/


 Total Parenteral


 Nutrition/Amino


 Acids/Dextrose/


 Fat Emulsion


 Intravenous  1,400 ml @ 


 58.333 mls/


 hr  TPN  CONT  4/23/20 22:00


 4/24/20 21:59 DC 4/24/20 00:06


58.333 MLS/HR


 


 Sodium Chloride


 100 meq/Potassium


 Chloride 40 meq/


 Magnesium Sulfate


 20 meq/Calcium


 Gluconate 15 meq/


 Multivitamins 10


 ml/Chromium/


 Copper/Manganese/


 Seleni/Zn 0.5 ml/


 Insulin Human


 Regular 35 unit/


 Total Parenteral


 Nutrition/Amino


 Acids/Dextrose/


 Fat Emulsion


 Intravenous  1,400 ml @ 


 58.333 mls/


 hr  TPN  CONT  4/22/20 22:00


 4/23/20 21:59 DC 4/22/20 22:27


58.333 MLS/HR


 


 Sodium Chloride


 100 meq/Potassium


 Phosphate 10 mmol/


 Magnesium Sulfate


 12 meq/Calcium


 Gluconate 15 meq/


 Multivitamins 10


 ml/Chromium/


 Copper/Manganese/


 Seleni/Zn 0.5 ml/


 Insulin Human


 Regular 35 unit/


 Potassium


 Chloride 20 meq/


 Total Parenteral


 Nutrition/Amino


 Acids/Dextrose/


 Fat Emulsion


 Intravenous  1,400 ml @ 


 58.333 mls/


 hr  TPN  CONT  4/16/20 22:00


 4/17/20 21:59 DC 4/16/20 22:10


58.333 MLS/HR


 


 Sodium Chloride


 100 meq/Potassium


 Phosphate 19 mmol/


 Magnesium Sulfate


 12 meq/Calcium


 Gluconate 15 meq/


 Multivitamins 10


 ml/Chromium/


 Copper/Manganese/


 Seleni/Zn 0.5 ml/


 Insulin Human


 Regular 40 unit/


 Potassium


 Chloride 20 meq/


 Total Parenteral


 Nutrition/Amino


 Acids/Dextrose/


 Fat Emulsion


 Intravenous  1,400 ml @ 


 58.333 mls/


 hr  TPN  CONT  4/15/20 22:00


 4/16/20 21:59 DC 4/15/20 21:20


58.333 MLS/HR


 


 Sodium Chloride


 100 meq/Potassium


 Phosphate 5 mmol/


 Magnesium Sulfate


 12 meq/Calcium


 Gluconate 15 meq/


 Multivitamins 10


 ml/Chromium/


 Copper/Manganese/


 Seleni/Zn 0.5 ml/


 Insulin Human


 Regular 35 unit/


 Potassium


 Chloride 20 meq/


 Total Parenteral


 Nutrition/Amino


 Acids/Dextrose/


 Fat Emulsion


 Intravenous  1,400 ml @ 


 58.333 mls/


 hr  TPN  CONT  4/17/20 22:00


 4/18/20 21:59 DC 4/17/20 22:59


58.333 MLS/HR


 


 Succinylcholine


 Chloride


  (Anectine)  120 mg  1X  ONCE  3/23/20 08:30


 3/23/20 08:31 DC 3/23/20 08:34


120 MG


 


 Vecuronium Bromide


  (Norcuron Bolus)  6 mg  PRN Q6HRS  PRN  5/7/20 19:15


 5/7/20 19:35 DC  














Labs:


Lab





Laboratory Tests








Test


 5/27/20


12:28 5/27/20


18:06 5/28/20


00:20 5/28/20


05:27


 


Glucose (Fingerstick)


 177 mg/dL


(70-99) 149 mg/dL


(70-99) 170 mg/dL


(70-99) 142 mg/dL


(70-99)


 


Test


 5/28/20


05:30 


 


 





 


Sodium Level


 137 mmol/L


(136-145) 


 


 





 


Potassium Level


 4.0 mmol/L


(3.5-5.1) 


 


 





 


Chloride Level


 98 mmol/L


() 


 


 





 


Carbon Dioxide Level


 30 mmol/L


(21-32) 


 


 





 


Anion Gap 9 (6-14)    


 


Blood Urea Nitrogen


 31 mg/dL


(7-20) 


 


 





 


Creatinine


 0.9 mg/dL


(0.6-1.0) 


 


 





 


Estimated GFR


(Cockcroft-Gault) 66.5 


 


 


 





 


Glucose Level


 155 mg/dL


(70-99) 


 


 





 


Calcium Level


 10.1 mg/dL


(8.5-10.1) 


 


 





 


Triglycerides Level


 206 mg/dL


(0-150) 


 


 














Objective:


Assessment:


Fever intermittent could be from underlying pancreatitis vs  aspiration 

pneumonia


Acute pancreatitis with persistent  necrosis


CT a/p 4/9


    Increased ascites. Persistent evidence of necrotizing pancreatitis with 

fluid and phlegmon


   at the pancreas


   4/27 status post ROBERT drain placement; yeast


   5/6 fluid  candida parapsilosis fluid amylase high


Cholelithiasis with thickening of the gallbladder wall.


Leucocytosis 


JED,Hyperkalemia, Metabolic acidosis off dialysis


Acute hypoxic resp failure ,bilateral pleural effusion and atelectasis


hypocalcemia 


Prediabetes


HTN


s/p trach





Plan:


Plan of Care


Continue Zosyn May 27


DC micafungin, 5/4


    Off dapto/zyvox/merrem


Aggressive pulmonary toilet


Maintain aspiration precaution


Supportive care





Critically ill 


Discussed with nursing staff











IVAN FRANZ MD           May 28, 2020 09:45

## 2020-05-28 NOTE — NUR
Dr. Castano said it was ok to take patient off the monitor for a couple hours to get a break 
from the room. Patient sitting at nurses station, hair was washed and braided, encouraged to 
keep coughing up sputum and manage anxiety.

## 2020-05-29 VITALS — SYSTOLIC BLOOD PRESSURE: 98 MMHG | DIASTOLIC BLOOD PRESSURE: 74 MMHG

## 2020-05-29 VITALS — SYSTOLIC BLOOD PRESSURE: 130 MMHG | DIASTOLIC BLOOD PRESSURE: 74 MMHG

## 2020-05-29 VITALS — DIASTOLIC BLOOD PRESSURE: 84 MMHG | SYSTOLIC BLOOD PRESSURE: 128 MMHG

## 2020-05-29 VITALS — DIASTOLIC BLOOD PRESSURE: 75 MMHG | SYSTOLIC BLOOD PRESSURE: 135 MMHG

## 2020-05-29 VITALS — SYSTOLIC BLOOD PRESSURE: 97 MMHG | DIASTOLIC BLOOD PRESSURE: 56 MMHG

## 2020-05-29 VITALS — SYSTOLIC BLOOD PRESSURE: 141 MMHG | DIASTOLIC BLOOD PRESSURE: 73 MMHG

## 2020-05-29 VITALS — SYSTOLIC BLOOD PRESSURE: 90 MMHG | DIASTOLIC BLOOD PRESSURE: 53 MMHG

## 2020-05-29 VITALS — SYSTOLIC BLOOD PRESSURE: 141 MMHG | DIASTOLIC BLOOD PRESSURE: 85 MMHG

## 2020-05-29 VITALS — SYSTOLIC BLOOD PRESSURE: 81 MMHG | DIASTOLIC BLOOD PRESSURE: 57 MMHG

## 2020-05-29 VITALS — DIASTOLIC BLOOD PRESSURE: 65 MMHG | SYSTOLIC BLOOD PRESSURE: 115 MMHG

## 2020-05-29 VITALS — DIASTOLIC BLOOD PRESSURE: 69 MMHG | SYSTOLIC BLOOD PRESSURE: 131 MMHG

## 2020-05-29 VITALS — DIASTOLIC BLOOD PRESSURE: 49 MMHG | SYSTOLIC BLOOD PRESSURE: 91 MMHG

## 2020-05-29 VITALS — SYSTOLIC BLOOD PRESSURE: 106 MMHG | DIASTOLIC BLOOD PRESSURE: 60 MMHG

## 2020-05-29 VITALS — DIASTOLIC BLOOD PRESSURE: 73 MMHG | SYSTOLIC BLOOD PRESSURE: 141 MMHG

## 2020-05-29 VITALS — DIASTOLIC BLOOD PRESSURE: 69 MMHG | SYSTOLIC BLOOD PRESSURE: 143 MMHG

## 2020-05-29 VITALS — DIASTOLIC BLOOD PRESSURE: 65 MMHG | SYSTOLIC BLOOD PRESSURE: 114 MMHG

## 2020-05-29 VITALS — DIASTOLIC BLOOD PRESSURE: 60 MMHG | SYSTOLIC BLOOD PRESSURE: 131 MMHG

## 2020-05-29 VITALS — DIASTOLIC BLOOD PRESSURE: 62 MMHG | SYSTOLIC BLOOD PRESSURE: 124 MMHG

## 2020-05-29 VITALS — DIASTOLIC BLOOD PRESSURE: 74 MMHG | SYSTOLIC BLOOD PRESSURE: 148 MMHG

## 2020-05-29 VITALS — DIASTOLIC BLOOD PRESSURE: 53 MMHG | SYSTOLIC BLOOD PRESSURE: 90 MMHG

## 2020-05-29 VITALS — DIASTOLIC BLOOD PRESSURE: 61 MMHG | SYSTOLIC BLOOD PRESSURE: 84 MMHG

## 2020-05-29 VITALS — DIASTOLIC BLOOD PRESSURE: 52 MMHG | SYSTOLIC BLOOD PRESSURE: 115 MMHG

## 2020-05-29 VITALS — DIASTOLIC BLOOD PRESSURE: 62 MMHG | SYSTOLIC BLOOD PRESSURE: 105 MMHG

## 2020-05-29 VITALS — DIASTOLIC BLOOD PRESSURE: 77 MMHG | SYSTOLIC BLOOD PRESSURE: 140 MMHG

## 2020-05-29 LAB
ALBUMIN SERPL-MCNC: 2.1 G/DL (ref 3.4–5)
ALBUMIN/GLOB SERPL: 0.4 {RATIO} (ref 1–1.7)
ALP SERPL-CCNC: 111 U/L (ref 46–116)
ALT SERPL-CCNC: 20 U/L (ref 14–59)
ANION GAP SERPL CALC-SCNC: 8 MMOL/L (ref 6–14)
AST SERPL-CCNC: 19 U/L (ref 15–37)
BASOPHILS # BLD AUTO: 0 X10^3/UL (ref 0–0.2)
BASOPHILS NFR BLD: 0 % (ref 0–3)
BILIRUB SERPL-MCNC: 0.7 MG/DL (ref 0.2–1)
BUN SERPL-MCNC: 36 MG/DL (ref 7–20)
BUN/CREAT SERPL: 40 (ref 6–20)
CALCIUM SERPL-MCNC: 10 MG/DL (ref 8.5–10.1)
CHLORIDE SERPL-SCNC: 99 MMOL/L (ref 98–107)
CO2 SERPL-SCNC: 31 MMOL/L (ref 21–32)
CREAT SERPL-MCNC: 0.9 MG/DL (ref 0.6–1)
EOSINOPHIL NFR BLD: 0.1 X10^3/UL (ref 0–0.7)
EOSINOPHIL NFR BLD: 1 % (ref 0–3)
ERYTHROCYTE [DISTWIDTH] IN BLOOD BY AUTOMATED COUNT: 19.2 % (ref 11.5–14.5)
GFR SERPLBLD BASED ON 1.73 SQ M-ARVRAT: 66.5 ML/MIN
GLOBULIN SER-MCNC: 5.2 G/DL (ref 2.2–3.8)
GLUCOSE SERPL-MCNC: 146 MG/DL (ref 70–99)
HCT VFR BLD CALC: 24.5 % (ref 36–47)
HGB BLD-MCNC: 8 G/DL (ref 12–15.5)
LYMPHOCYTES # BLD: 2.4 X10^3/UL (ref 1–4.8)
LYMPHOCYTES NFR BLD AUTO: 15 % (ref 24–48)
MCH RBC QN AUTO: 28 PG (ref 25–35)
MCHC RBC AUTO-ENTMCNC: 33 G/DL (ref 31–37)
MCV RBC AUTO: 86 FL (ref 79–100)
MONO #: 1.1 X10^3/UL (ref 0–1.1)
MONOCYTES NFR BLD: 7 % (ref 0–9)
NEUT #: 12.3 X10^3/UL (ref 1.8–7.7)
NEUTROPHILS NFR BLD AUTO: 77 % (ref 31–73)
PLATELET # BLD AUTO: 440 X10^3/UL (ref 140–400)
POTASSIUM SERPL-SCNC: 3.8 MMOL/L (ref 3.5–5.1)
PROT SERPL-MCNC: 7.3 G/DL (ref 6.4–8.2)
RBC # BLD AUTO: 2.83 X10^6/UL (ref 3.5–5.4)
SODIUM SERPL-SCNC: 138 MMOL/L (ref 136–145)
WBC # BLD AUTO: 16 X10^3/UL (ref 4–11)

## 2020-05-29 RX ADMIN — DEXMEDETOMIDINE HYDROCHLORIDE PRN MLS/HR: 100 INJECTION, SOLUTION, CONCENTRATE INTRAVENOUS at 00:00

## 2020-05-29 RX ADMIN — PANTOPRAZOLE SODIUM SCH MG: 40 INJECTION, POWDER, FOR SOLUTION INTRAVENOUS at 10:08

## 2020-05-29 RX ADMIN — INSULIN LISPRO SCH UNITS: 100 INJECTION, SOLUTION INTRAVENOUS; SUBCUTANEOUS at 12:27

## 2020-05-29 RX ADMIN — INSULIN LISPRO SCH UNITS: 100 INJECTION, SOLUTION INTRAVENOUS; SUBCUTANEOUS at 17:00

## 2020-05-29 RX ADMIN — FENTANYL CITRATE PRN MCG: 50 INJECTION INTRAMUSCULAR; INTRAVENOUS at 12:17

## 2020-05-29 RX ADMIN — ONDANSETRON PRN MG: 2 INJECTION INTRAMUSCULAR; INTRAVENOUS at 06:21

## 2020-05-29 RX ADMIN — PIPERACILLIN SODIUM AND TAZOBACTAM SODIUM SCH MLS/HR: 3; .375 INJECTION, POWDER, LYOPHILIZED, FOR SOLUTION INTRAVENOUS at 12:20

## 2020-05-29 RX ADMIN — INSULIN LISPRO SCH UNITS: 100 INJECTION, SOLUTION INTRAVENOUS; SUBCUTANEOUS at 06:00

## 2020-05-29 RX ADMIN — FUROSEMIDE SCH MG: 10 INJECTION, SOLUTION INTRAMUSCULAR; INTRAVENOUS at 13:29

## 2020-05-29 RX ADMIN — ONDANSETRON PRN MG: 2 INJECTION INTRAMUSCULAR; INTRAVENOUS at 20:58

## 2020-05-29 RX ADMIN — DEXTRAN 70, GLYCERIN, HYPROMELLOSE PRN DROP: 1; 2; 3 SOLUTION/ DROPS OPHTHALMIC at 10:08

## 2020-05-29 RX ADMIN — PIPERACILLIN SODIUM AND TAZOBACTAM SODIUM SCH MLS/HR: 3; .375 INJECTION, POWDER, LYOPHILIZED, FOR SOLUTION INTRAVENOUS at 06:12

## 2020-05-29 RX ADMIN — ENOXAPARIN SODIUM SCH MG: 40 INJECTION SUBCUTANEOUS at 16:47

## 2020-05-29 RX ADMIN — INSULIN LISPRO SCH UNITS: 100 INJECTION, SOLUTION INTRAVENOUS; SUBCUTANEOUS at 00:02

## 2020-05-29 RX ADMIN — FENTANYL CITRATE PRN MCG: 50 INJECTION INTRAMUSCULAR; INTRAVENOUS at 20:58

## 2020-05-29 RX ADMIN — ONDANSETRON PRN MG: 2 INJECTION INTRAMUSCULAR; INTRAVENOUS at 12:34

## 2020-05-29 RX ADMIN — PIPERACILLIN SODIUM AND TAZOBACTAM SODIUM SCH MLS/HR: 3; .375 INJECTION, POWDER, LYOPHILIZED, FOR SOLUTION INTRAVENOUS at 16:47

## 2020-05-29 RX ADMIN — FUROSEMIDE SCH MG: 10 INJECTION, SOLUTION INTRAMUSCULAR; INTRAVENOUS at 10:08

## 2020-05-29 RX ADMIN — FENTANYL CITRATE PRN MCG: 50 INJECTION INTRAMUSCULAR; INTRAVENOUS at 16:48

## 2020-05-29 RX ADMIN — BACITRACIN SCH MLS/HR: 5000 INJECTION, POWDER, FOR SOLUTION INTRAMUSCULAR at 13:07

## 2020-05-29 RX ADMIN — DEXMEDETOMIDINE HYDROCHLORIDE PRN MLS/HR: 100 INJECTION, SOLUTION, CONCENTRATE INTRAVENOUS at 17:26

## 2020-05-29 NOTE — PDOC
Subjective:


Subjective:


Gives me a thumbs up.





Objective:


Objective:


Nurse present - has been up walking, went outside again today, NG clamped - no 

nausea/vomiting issues today - wonders if mostly this is related to coughing.  

Was able to take a couple sips of thickened juice - got it down but it tasted 

gross.


Vital Signs:





                                   Vital Signs








  Date Time  Temp Pulse Resp B/P (MAP) Pulse Ox O2 Delivery O2 Flow Rate FiO2


 


5/29/20 12:17   25  96 Room Air  


 


5/29/20 06:00  115  115/65 (82)    


 


5/29/20 04:00 99.3       





 99.3       


 


5/28/20 21:34       8.0 








Labs:





Laboratory Tests








Test


 5/28/20


12:40 5/28/20


17:50 5/28/20


23:45 5/29/20


06:18


 


Glucose (Fingerstick)


 237 mg/dL


(70-99) 171 mg/dL


(70-99) 168 mg/dL


(70-99) 143 mg/dL


(70-99)


 


Test


 5/29/20


12:26 


 


 





 


Glucose (Fingerstick)


 189 mg/dL


(70-99) 


 


 














PE:





GEN: NAD, up to chair


LUNGS: trach/room air


HEART: tachycardic


ABD: soft


NEURO/PSYCH: A & O 3





A/P:


Gallstone pancreatitis, MOSF





--


Slowly improving.





Hemodynamically unstable?:  No


Is patient in severe pain?:  No


Is NPO status required?:  No











ELAINE-RAGHAVENDRA CONNOR         May 29, 2020 12:42

## 2020-05-29 NOTE — PDOC
PULMONARY PROGRESS NOTES


Subjective


Patient intubated on 3/23 , s/p trach 4/6,


Remains on TS 


Denies SOB or increased cough,


Vitals





Vital Signs








  Date Time  Temp Pulse Resp B/P (MAP) Pulse Ox O2 Delivery O2 Flow Rate FiO2


 


5/29/20 12:17   25  96 Room Air  


 


5/29/20 06:00  115  115/65 (82)    


 


5/29/20 04:00 99.3       





 99.3       


 


5/28/20 21:34       8.0 








ROS:  No Chest Pain, No Abdominal Pain, No Increase Cough


General:  Alert, No acute distress


HEENT:  Other (trach midline )


Lungs:  Other (Good air movement but having a lot of productive sputum from her 

tracheostomy site)


Cardiovascular:  S1, S2


Abdomen:  Soft, Non-tender


Neuro Exam:  Alert


Extremities:  Other (+3 generalized edema )


Skin:  Warm, Dry


Labs





Laboratory Tests








Test


 5/27/20


12:28 5/27/20


18:06 5/28/20


00:20 5/28/20


05:27


 


Glucose (Fingerstick)


 177 mg/dL


(70-99) 149 mg/dL


(70-99) 170 mg/dL


(70-99) 142 mg/dL


(70-99)


 


Test


 5/28/20


05:30 5/28/20


12:40 5/28/20


17:50 5/28/20


23:45


 


Sodium Level


 137 mmol/L


(136-145) 


 


 





 


Potassium Level


 4.0 mmol/L


(3.5-5.1) 


 


 





 


Chloride Level


 98 mmol/L


() 


 


 





 


Carbon Dioxide Level


 30 mmol/L


(21-32) 


 


 





 


Anion Gap 9 (6-14)    


 


Blood Urea Nitrogen


 31 mg/dL


(7-20) 


 


 





 


Creatinine


 0.9 mg/dL


(0.6-1.0) 


 


 





 


Estimated GFR


(Cockcroft-Gault) 66.5 


 


 


 





 


Glucose Level


 155 mg/dL


(70-99) 


 


 





 


Calcium Level


 10.1 mg/dL


(8.5-10.1) 


 


 





 


Triglycerides Level


 206 mg/dL


(0-150) 


 


 





 


Glucose (Fingerstick)


 


 237 mg/dL


(70-99) 171 mg/dL


(70-99) 168 mg/dL


(70-99)


 


Test


 5/29/20


05:50 5/29/20


06:18 


 





 


White Blood Count


 16.0 x10^3/uL


(4.0-11.0) 


 


 





 


Red Blood Count


 2.83 x10^6/uL


(3.50-5.40) 


 


 





 


Hemoglobin


 8.0 g/dL


(12.0-15.5) 


 


 





 


Hematocrit


 24.5 %


(36.0-47.0) 


 


 





 


Mean Corpuscular Volume 86 fL ()    


 


Mean Corpuscular Hemoglobin 28 pg (25-35)    


 


Mean Corpuscular Hemoglobin


Concent 33 g/dL


(31-37) 


 


 





 


Red Cell Distribution Width


 19.2 %


(11.5-14.5) 


 


 





 


Platelet Count


 440 x10^3/uL


(140-400) 


 


 





 


Neutrophils (%) (Auto) 77 % (31-73)    


 


Lymphocytes (%) (Auto) 15 % (24-48)    


 


Monocytes (%) (Auto) 7 % (0-9)    


 


Eosinophils (%) (Auto) 1 % (0-3)    


 


Basophils (%) (Auto) 0 % (0-3)    


 


Neutrophils # (Auto)


 12.3 x10^3/uL


(1.8-7.7) 


 


 





 


Lymphocytes # (Auto)


 2.4 x10^3/uL


(1.0-4.8) 


 


 





 


Monocytes # (Auto)


 1.1 x10^3/uL


(0.0-1.1) 


 


 





 


Eosinophils # (Auto)


 0.1 x10^3/uL


(0.0-0.7) 


 


 





 


Basophils # (Auto)


 0.0 x10^3/uL


(0.0-0.2) 


 


 





 


Sodium Level


 138 mmol/L


(136-145) 


 


 





 


Potassium Level


 3.8 mmol/L


(3.5-5.1) 


 


 





 


Chloride Level


 99 mmol/L


() 


 


 





 


Carbon Dioxide Level


 31 mmol/L


(21-32) 


 


 





 


Anion Gap 8 (6-14)    


 


Blood Urea Nitrogen


 36 mg/dL


(7-20) 


 


 





 


Creatinine


 0.9 mg/dL


(0.6-1.0) 


 


 





 


Estimated GFR


(Cockcroft-Gault) 66.5 


 


 


 





 


BUN/Creatinine Ratio 40 (6-20)    


 


Glucose Level


 146 mg/dL


(70-99) 


 


 





 


Calcium Level


 10.0 mg/dL


(8.5-10.1) 


 


 





 


Magnesium Level


 2.2 mg/dL


(1.8-2.4) 


 


 





 


Total Bilirubin


 0.7 mg/dL


(0.2-1.0) 


 


 





 


Aspartate Amino Transf


(AST/SGOT) 19 U/L (15-37) 


 


 


 





 


Alanine Aminotransferase


(ALT/SGPT) 20 U/L (14-59) 


 


 


 





 


Alkaline Phosphatase


 111 U/L


() 


 


 





 


Total Protein


 7.3 g/dL


(6.4-8.2) 


 


 





 


Albumin


 2.1 g/dL


(3.4-5.0) 


 


 





 


Albumin/Globulin Ratio 0.4 (1.0-1.7)    


 


Glucose (Fingerstick)


 


 143 mg/dL


(70-99) 


 











Laboratory Tests








Test


 5/28/20


12:40 5/28/20


17:50 5/28/20


23:45 5/29/20


05:50


 


Glucose (Fingerstick)


 237 mg/dL


(70-99) 171 mg/dL


(70-99) 168 mg/dL


(70-99) 





 


White Blood Count


 


 


 


 16.0 x10^3/uL


(4.0-11.0)


 


Red Blood Count


 


 


 


 2.83 x10^6/uL


(3.50-5.40)


 


Hemoglobin


 


 


 


 8.0 g/dL


(12.0-15.5)


 


Hematocrit


 


 


 


 24.5 %


(36.0-47.0)


 


Mean Corpuscular Volume    86 fL () 


 


Mean Corpuscular Hemoglobin    28 pg (25-35) 


 


Mean Corpuscular Hemoglobin


Concent 


 


 


 33 g/dL


(31-37)


 


Red Cell Distribution Width


 


 


 


 19.2 %


(11.5-14.5)


 


Platelet Count


 


 


 


 440 x10^3/uL


(140-400)


 


Neutrophils (%) (Auto)    77 % (31-73) 


 


Lymphocytes (%) (Auto)    15 % (24-48) 


 


Monocytes (%) (Auto)    7 % (0-9) 


 


Eosinophils (%) (Auto)    1 % (0-3) 


 


Basophils (%) (Auto)    0 % (0-3) 


 


Neutrophils # (Auto)


 


 


 


 12.3 x10^3/uL


(1.8-7.7)


 


Lymphocytes # (Auto)


 


 


 


 2.4 x10^3/uL


(1.0-4.8)


 


Monocytes # (Auto)


 


 


 


 1.1 x10^3/uL


(0.0-1.1)


 


Eosinophils # (Auto)


 


 


 


 0.1 x10^3/uL


(0.0-0.7)


 


Basophils # (Auto)


 


 


 


 0.0 x10^3/uL


(0.0-0.2)


 


Sodium Level


 


 


 


 138 mmol/L


(136-145)


 


Potassium Level


 


 


 


 3.8 mmol/L


(3.5-5.1)


 


Chloride Level


 


 


 


 99 mmol/L


()


 


Carbon Dioxide Level


 


 


 


 31 mmol/L


(21-32)


 


Anion Gap    8 (6-14) 


 


Blood Urea Nitrogen


 


 


 


 36 mg/dL


(7-20)


 


Creatinine


 


 


 


 0.9 mg/dL


(0.6-1.0)


 


Estimated GFR


(Cockcroft-Gault) 


 


 


 66.5 





 


BUN/Creatinine Ratio    40 (6-20) 


 


Glucose Level


 


 


 


 146 mg/dL


(70-99)


 


Calcium Level


 


 


 


 10.0 mg/dL


(8.5-10.1)


 


Magnesium Level


 


 


 


 2.2 mg/dL


(1.8-2.4)


 


Total Bilirubin


 


 


 


 0.7 mg/dL


(0.2-1.0)


 


Aspartate Amino Transf


(AST/SGOT) 


 


 


 19 U/L (15-37) 





 


Alanine Aminotransferase


(ALT/SGPT) 


 


 


 20 U/L (14-59) 





 


Alkaline Phosphatase


 


 


 


 111 U/L


()


 


Total Protein


 


 


 


 7.3 g/dL


(6.4-8.2)


 


Albumin


 


 


 


 2.1 g/dL


(3.4-5.0)


 


Albumin/Globulin Ratio    0.4 (1.0-1.7) 


 


Test


 5/29/20


06:18 


 


 





 


Glucose (Fingerstick)


 143 mg/dL


(70-99) 


 


 











Medications





Active Scripts








 Medications  Dose


 Route/Sig


 Max Daily Dose Days Date Category


 


 Bisoprolol


 Fumarate 5 Mg


 Tablet  10 Mg


 PO DAILY


   3/16/20 Reported











Impression


.


IMPRESSION:


1.  Acute hypoxemic respiratory failure secondary to ARDS status post trach,


2.  Gallstone pancreatitis


3.  Severe metabolic acidosis.stable


4.  Acute kidney injury-stable, Off HD-- continue to improve 


5.  Acute gallstone pancreatitis.


6.  Hypoalbuminemia.


7.  Moderate persistent effusions, s/p left thora  5/12


8.  Fever-  Per ID, per surgery--resolved 


9.  Chronic anemia


10. Covid 19 testing negative


11. Moderate to large ascites-S/P paracentisis


12.S/P paracentisis with 4 liters removed on 4/15/20


13. S/P IR drain placement on 5/8/2020





Plan


.


1.Continue supplemental oxygen via trach shield--  PMV/capping as tolerated


2. s/p  thoracentesis, 5/12, 3 litres removed


3. Follow surgery recs-- S/P 3 drain placed in IR on 5/8/2020


4. Follow ID recs for ABX


5. Follow nephrology recs 


6. Continue TPN 


DVT/GI PPX: heparin SQ/ protonix 


D/W RN and RT, 





CODE:SHARYN VALDIVIA MD                 May 29, 2020 12:26

## 2020-05-29 NOTE — PDOC
[facility] [facility] PROGRESS NOTE


Chief Complaint


Chief Complaint


Acute hypoxic Respiratory failure requiring mechanical ventilation (now 

extubated for several days but still with tracheostomy)


Tracheostomy


bilateral pleural effusions/pulm edema 


Sepsis


Severe Acute gallstone pancreatitis (not a surgical candidate at this time) with

necrosis


Acute kidney failure now requiring dialysis


Salpingitis


Gallstones (Calculus of gallbladder with acute cholecystitis without obstruc

tion)


HTN 


Leukocytosis 


Hypoxia


Uterine fibroid


Intractable pain


Intractable nausea


Covid 19 negative. 


Acute on chronic anemia 


EEG: No seizure activityFever  - better currently - intermittent could be from 

underlying pancreatitis blood cults 5/4 - neg so far


? Ileus with vomiting


Abd distention - U/S and CT reviewed s/p 0.4 L of opaque, debris-containing 

ascites was removed 5/6


Acute pancreatitis with persistent necrosis


- 4/27 status post ROBERT drain placement + C paropsilosis. s/p additional drains 

5/8


Anemia - S/p PRBCs


Cholelithiasis with thickening of the gallbladder wall.


Leucocytosis improving


JED, hyperkalemia, Metabolic acidosis off dialysis


Acute hypoxic resp failure ,bilateral pleural effusion and atelectasis


hypocalcemia 


Prediabetes


HTN


s/p trach


ESRD on HD


Hyperglycemia





History of Present Illness


History of Present Illness


5/29/2020


Patient seen and examined in the ICU once again


She is back on NG suction


On IV Zosyn


Has IV TPN


Sedated with Precedex but anxious still


Appears somewhat clammy and pale


Chart reviewed


Discussed with RN


She remains critically ill














5/28/2020


Patient seen and examined in the ICU


She has an NG that is clamped we are hoping to start some clear liquids but she 

looks quite ill


She is on IV TPN


Meropenem changed to IV Zosyn (agree)


She has [place] to bedside drainage


She is semi-sedated with Precedex


Chart reviewed


Discussed with RN


She remains critically ill











Seen bedside. Hb 8. She was just a bit hypoxic, had a mucous plug suctioned. 

Able to vocalize well with speaking valve, tells me we are being very hard on 

her and she would like to be drugged back to sleep.  She wants to wake up and 

feel better. On trach shield, 


T max 100.4. afebrile this a.m.





5/26/2020


Patient seen and examined in the ICU


She is up in the chair very frail trying to talk a little shaky


Discussed with RN


Chart reviewed








5/25/2020


Patient seen and examined in the ICU


Patient up in the chair


Having a severe coughing episode with a lot of phlegm coming out of her 

tracheostomy


Discussed with RN


Discussed with physical therapy


Chart reviewed











5/24,.    feels well,  has been out of room in wheelchair


no complaint,    still weak,   some with not wanting to wear her valve on trach


cont other,   may be able to work with speech tomorrow,    ketty OK to 

try, 








5/23,  anxiety is up today,   she dislikes the valve still,    


shower and outside today


.   





5/22 she doesnt want to wear her passy-kristan valve,  discussed str and plan with 

her.


speech following,  needs swallow study,   but needs to wear her valve longer,  


cont current


able to walk some, walker 





5/21  stronger,   better,   


we discussed better oral care


she would like to try swallow study,  wants to try to eat,    speech is follow

ing








5/20/2020 


She remains in the ICU


sitting up and working with OT,   getting better


if limit pain meds, may do better off the vent, 





Nurses trying to suction her,  that is also improved, 


Chart reviewed


 








5/19/2020


Patient seen and examined in the ICU


She had an episode yesterday of tachycardia and severe agitation we gave her 

some Ativan


After that she seemed to have stroke symptoms but now that the Ativan has wore 

off her stroke symptoms have resolved


 


 


She is on IV meropenem and daptomycin and micafungin


Chart reviewed


Discussed with RN


Patient is still critically ill


 


BRIEF OPERATIVE NOTE


Pre-Op Diagnosis


Pancreatitis with pseudocysts, suspected infection


Post-Op Diagnosis


same


Procedure Performed


CT abdominal Drains x 3


Surgeon


Hardy


Anesthesia Type:  Conscious Sedation


Findings


3 abdominal drains, 14F, with turbid pancreatic fluid and necrotic debris in ea

ch.


Complications


No immediate








5/9: Patient today somewhat restless and having bilious secretions from ET tube,

imaging studies ordered, discussed with consultant. Pretty poor prognosis, 

hopefully is not a fistula, poor surgical candidate. 





5/10: Imaging with no acute events, she seems more stable today compared to 

yesterday. Encouraged as much activity as possible patient at high risk for 

severe depression.





Vitals/I&O


Vitals/I&O:





                                   Vital Signs








  Date Time  Temp Pulse Resp B/P (MAP) Pulse Ox O2 Delivery O2 Flow Rate FiO2


 


5/29/20 06:00  115 26 115/65 (82) 94 Room Air  


 


5/29/20 04:00 99.3       





 99.3       


 


5/28/20 21:34       8.0 














                                    I & O   


 


 5/28/20 5/28/20 5/29/20





 14:59 22:59 06:59


 


Intake Total 50 ml 909 ml 


 


Output Total 771 ml 735 ml 355 ml


 


Balance -721 ml 174 ml -355 ml











Physical Exam


Physical Exam:


GENERAL: Was asleep awoke seems a little agitated looks very very ill


HEENT:  Oral cavity clear,  NGT


NECK:  Trach shield


LUNGS: A lot of coughing with productive sputum from the tracheostomy site


HEART:  S1, S2, regular 


ABDOMEN: mod distention, hypoactive BS, tender, + drains x 3


: [place] (4/14)


EXTREMITIES: Generalized edema, improving, no cyanosis, SCDs bilaterally 


SKIN: Warm and dry.  No generalized rash.  


CNS: Very weak 


RUE-PICC (4/30) clean


General:  Alert, Cooperative, No acute distress


Heart:  Regular rate, Normal S1, Normal S2, No murmurs, Gallops


Lungs:  Other (Good air movement but having a lot of productive sputum from her 

tracheostomy site)


Abdomen:  Soft, No tenderness


Extremities:  No clubbing, No cyanosis, No edema, Normal pulses, No 

tenderness/swelling


Skin:  Other (warm, dry)





Labs


Labs:





Laboratory Tests








Test


 5/28/20


12:40 5/28/20


17:50 5/28/20


23:45 5/29/20


05:50


 


Glucose (Fingerstick)


 237 mg/dL


(70-99) 171 mg/dL


(70-99) 168 mg/dL


(70-99) 





 


White Blood Count


 


 


 


 16.0 x10^3/uL


(4.0-11.0)


 


Red Blood Count


 


 


 


 2.83 x10^6/uL


(3.50-5.40)


 


Hemoglobin


 


 


 


 8.0 g/dL


(12.0-15.5)


 


Hematocrit


 


 


 


 24.5 %


(36.0-47.0)


 


Mean Corpuscular Volume    86 fL () 


 


Mean Corpuscular Hemoglobin    28 pg (25-35) 


 


Mean Corpuscular Hemoglobin


Concent 


 


 


 33 g/dL


(31-37)


 


Red Cell Distribution Width


 


 


 


 19.2 %


(11.5-14.5)


 


Platelet Count


 


 


 


 440 x10^3/uL


(140-400)


 


Neutrophils (%) (Auto)    77 % (31-73) 


 


Lymphocytes (%) (Auto)    15 % (24-48) 


 


Monocytes (%) (Auto)    7 % (0-9) 


 


Eosinophils (%) (Auto)    1 % (0-3) 


 


Basophils (%) (Auto)    0 % (0-3) 


 


Neutrophils # (Auto)


 


 


 


 12.3 x10^3/uL


(1.8-7.7)


 


Lymphocytes # (Auto)


 


 


 


 2.4 x10^3/uL


(1.0-4.8)


 


Monocytes # (Auto)


 


 


 


 1.1 x10^3/uL


(0.0-1.1)


 


Eosinophils # (Auto)


 


 


 


 0.1 x10^3/uL


(0.0-0.7)


 


Basophils # (Auto)


 


 


 


 0.0 x10^3/uL


(0.0-0.2)


 


Sodium Level


 


 


 


 138 mmol/L


(136-145)


 


Potassium Level


 


 


 


 3.8 mmol/L


(3.5-5.1)


 


Chloride Level


 


 


 


 99 mmol/L


()


 


Carbon Dioxide Level


 


 


 


 31 mmol/L


(21-32)


 


Anion Gap    8 (6-14) 


 


Blood Urea Nitrogen


 


 


 


 36 mg/dL


(7-20)


 


Creatinine


 


 


 


 0.9 mg/dL


(0.6-1.0)


 


Estimated GFR


(Cockcroft-Gault) 


 


 


 66.5 





 


BUN/Creatinine Ratio    40 (6-20) 


 


Glucose Level


 


 


 


 146 mg/dL


(70-99)


 


Calcium Level


 


 


 


 10.0 mg/dL


(8.5-10.1)


 


Total Bilirubin


 


 


 


 0.7 mg/dL


(0.2-1.0)


 


Aspartate Amino Transf


(AST/SGOT) 


 


 


 19 U/L (15-37) 





 


Alanine Aminotransferase


(ALT/SGPT) 


 


 


 20 U/L (14-59) 





 


Alkaline Phosphatase


 


 


 


 111 U/L


()


 


Total Protein


 


 


 


 7.3 g/dL


(6.4-8.2)


 


Albumin


 


 


 


 2.1 g/dL


(3.4-5.0)


 


Albumin/Globulin Ratio    0.4 (1.0-1.7) 


 


Test


 5/29/20


06:18 


 


 





 


Glucose (Fingerstick)


 143 mg/dL


(70-99) 


 


 














Assessment and Plan


Assessmemt and Plan


Problems


Medical Problems:


(1) Acute pancreatitis


Status: Acute  





(2) Cholelithiasis


Status: Acute  





Acute hypoxic Respiratory failure requiring mechanical ventilation was initially

intubated on 3/23 was off for couple of days now back on


Severe gallstone pancreatitis (not a surgical candidate at this time) with 

necrosis


Tracheostomy


bilateral pleural effusions/pulm edema 


Severe sepsis


Acute kidney failure now requiring dialysis


Salpingitis


Gallstones (Calculus of gallbladder with acute cholecystitis without 

obstruction)


HTN 


Leukocytosis 


Hypoxia


Uterine fibroid


Intractable pain


Intractable nausea


Covid 19 negative. 


Acute on chronic anemia 


EEG: No seizure activityFever  - better currently - intermittent could be from 

underlying pancreatitis blood cults 5/4 - neg so far


? Ileus with vomiting


Abd distention - U/S and CT reviewed s/p 0.4 L of opaque, debris-containing 

ascites was removed 5/6


Acute pancreatitis with persistent necrosis


- 4/27 status post ROBERT drain placement + C paropsilosis. s/p additional drains 

5/8


Anemia - S/p PRBCs


Cholelithiasis with thickening of the gallbladder wall.


Leucocytosis improving


JED, hyperkalemia, Metabolic acidosis off dialysis


Acute hypoxic resp failure ,bilateral pleural effusion and atelectasis


hypocalcemia 


Prediabetes


HTN


s/p trach


ESRD on HD


Hyperglycemia








Plan


ICU monitoring


Wound care


Hold off on Ativan for now as she gets too weak with it


Humidified O2 via nasal cannula for now but we can use trach shield as backup


NG suctioning


Nebulizers


Continue IV antibiotics and micafungin


Sedation with Precedex PRN


TPN protocol


Continue [place] to bedside drainage


Tracheostomy care


Hope to eventually move towards decannulation (we have a speaking valve for now)


Trend labs


Appreciate subspecialist input


She is still critically ill


I am still very concerned about her long-term prognosis ! (she scored a 9 on 

[place] criteria 3 weeks ago). 


Total time 33-minute





Comment


Review of Relevant


I have reviewed the following items josy (where applicable) has been applied.


Medications:





Current Medications








 Medications


  (Trade)  Dose


 Ordered  Sig/Yee


 Route


 PRN Reason  Start Time


 Stop Time Status Last Admin


Dose Admin


 


 Potassium


 Chloride 70 meq/


 Magnesium Sulfate


 20 meq/


 Multivitamins 10


 ml/Chromium/


 Copper/Manganese/


 Seleni/Zn 1 ml/


 Insulin Human


 Regular 15 unit/


 Total Parenteral


 Nutrition/Amino


 Acids/Dextrose/


 Fat Emulsion


 Intravenous  1,800 ml @ 


 75 mls/hr  TPN  CONT


 IV


   5/28/20 22:00


 5/29/20 21:59  5/28/20 22:33














Hemodynamically unstable?:  No


Is patient in severe pain?:  No


Is NPO status required?:  No











HECTOR MASON III DO           May 29, 2020 08:14

## 2020-05-29 NOTE — PDOC
SURGICAL PROGRESS NOTE


Subjective


Pt doing well, up in chair, feels better


Vital Signs





Vital Signs








  Date Time  Temp Pulse Resp B/P (MAP) Pulse Ox O2 Delivery O2 Flow Rate FiO2


 


5/29/20 13:06   16  97 Room Air  


 


5/29/20 06:00  115  115/65 (82)    


 


5/29/20 04:00 99.3       





 99.3       


 


5/28/20 21:34       8.0 








I&O











Intake and Output 


 


 5/29/20





 07:00


 


Intake Total 959 ml


 


Output Total 1861 ml


 


Balance -902 ml


 


 


 


Intake Oral 0 ml


 


IV Total 959 ml


 


Output Urine Total 1816 ml


 


Drainage Total 45 ml








General:  Alert, Cooperative, No acute distress


Abdomen:  Soft, No tenderness


Labs





Laboratory Tests








Test


 5/27/20


18:06 5/28/20


00:20 5/28/20


05:27 5/28/20


05:30


 


Glucose (Fingerstick)


 149 mg/dL


(70-99) 170 mg/dL


(70-99) 142 mg/dL


(70-99) 





 


Sodium Level


 


 


 


 137 mmol/L


(136-145)


 


Potassium Level


 


 


 


 4.0 mmol/L


(3.5-5.1)


 


Chloride Level


 


 


 


 98 mmol/L


()


 


Carbon Dioxide Level


 


 


 


 30 mmol/L


(21-32)


 


Anion Gap    9 (6-14) 


 


Blood Urea Nitrogen


 


 


 


 31 mg/dL


(7-20)


 


Creatinine


 


 


 


 0.9 mg/dL


(0.6-1.0)


 


Estimated GFR


(Cockcroft-Gault) 


 


 


 66.5 





 


Glucose Level


 


 


 


 155 mg/dL


(70-99)


 


Calcium Level


 


 


 


 10.1 mg/dL


(8.5-10.1)


 


Triglycerides Level


 


 


 


 206 mg/dL


(0-150)


 


Test


 5/28/20


12:40 5/28/20


17:50 5/28/20


23:45 5/29/20


05:50


 


Glucose (Fingerstick)


 237 mg/dL


(70-99) 171 mg/dL


(70-99) 168 mg/dL


(70-99) 





 


White Blood Count


 


 


 


 16.0 x10^3/uL


(4.0-11.0)


 


Red Blood Count


 


 


 


 2.83 x10^6/uL


(3.50-5.40)


 


Hemoglobin


 


 


 


 8.0 g/dL


(12.0-15.5)


 


Hematocrit


 


 


 


 24.5 %


(36.0-47.0)


 


Mean Corpuscular Volume    86 fL () 


 


Mean Corpuscular Hemoglobin    28 pg (25-35) 


 


Mean Corpuscular Hemoglobin


Concent 


 


 


 33 g/dL


(31-37)


 


Red Cell Distribution Width


 


 


 


 19.2 %


(11.5-14.5)


 


Platelet Count


 


 


 


 440 x10^3/uL


(140-400)


 


Neutrophils (%) (Auto)    77 % (31-73) 


 


Lymphocytes (%) (Auto)    15 % (24-48) 


 


Monocytes (%) (Auto)    7 % (0-9) 


 


Eosinophils (%) (Auto)    1 % (0-3) 


 


Basophils (%) (Auto)    0 % (0-3) 


 


Neutrophils # (Auto)


 


 


 


 12.3 x10^3/uL


(1.8-7.7)


 


Lymphocytes # (Auto)


 


 


 


 2.4 x10^3/uL


(1.0-4.8)


 


Monocytes # (Auto)


 


 


 


 1.1 x10^3/uL


(0.0-1.1)


 


Eosinophils # (Auto)


 


 


 


 0.1 x10^3/uL


(0.0-0.7)


 


Basophils # (Auto)


 


 


 


 0.0 x10^3/uL


(0.0-0.2)


 


Sodium Level


 


 


 


 138 mmol/L


(136-145)


 


Potassium Level


 


 


 


 3.8 mmol/L


(3.5-5.1)


 


Chloride Level


 


 


 


 99 mmol/L


()


 


Carbon Dioxide Level


 


 


 


 31 mmol/L


(21-32)


 


Anion Gap    8 (6-14) 


 


Blood Urea Nitrogen


 


 


 


 36 mg/dL


(7-20)


 


Creatinine


 


 


 


 0.9 mg/dL


(0.6-1.0)


 


Estimated GFR


(Cockcroft-Gault) 


 


 


 66.5 





 


BUN/Creatinine Ratio    40 (6-20) 


 


Glucose Level


 


 


 


 146 mg/dL


(70-99)


 


Calcium Level


 


 


 


 10.0 mg/dL


(8.5-10.1)


 


Magnesium Level


 


 


 


 2.2 mg/dL


(1.8-2.4)


 


Total Bilirubin


 


 


 


 0.7 mg/dL


(0.2-1.0)


 


Aspartate Amino Transf


(AST/SGOT) 


 


 


 19 U/L (15-37) 





 


Alanine Aminotransferase


(ALT/SGPT) 


 


 


 20 U/L (14-59) 





 


Alkaline Phosphatase


 


 


 


 111 U/L


()


 


Total Protein


 


 


 


 7.3 g/dL


(6.4-8.2)


 


Albumin


 


 


 


 2.1 g/dL


(3.4-5.0)


 


Albumin/Globulin Ratio    0.4 (1.0-1.7) 


 


Test


 5/29/20


06:18 5/29/20


12:26 


 





 


Glucose (Fingerstick)


 143 mg/dL


(70-99) 189 mg/dL


(70-99) 


 











Laboratory Tests








Test


 5/28/20


17:50 5/28/20


23:45 5/29/20


05:50 5/29/20


06:18


 


Glucose (Fingerstick)


 171 mg/dL


(70-99) 168 mg/dL


(70-99) 


 143 mg/dL


(70-99)


 


White Blood Count


 


 


 16.0 x10^3/uL


(4.0-11.0) 





 


Red Blood Count


 


 


 2.83 x10^6/uL


(3.50-5.40) 





 


Hemoglobin


 


 


 8.0 g/dL


(12.0-15.5) 





 


Hematocrit


 


 


 24.5 %


(36.0-47.0) 





 


Mean Corpuscular Volume   86 fL ()  


 


Mean Corpuscular Hemoglobin   28 pg (25-35)  


 


Mean Corpuscular Hemoglobin


Concent 


 


 33 g/dL


(31-37) 





 


Red Cell Distribution Width


 


 


 19.2 %


(11.5-14.5) 





 


Platelet Count


 


 


 440 x10^3/uL


(140-400) 





 


Neutrophils (%) (Auto)   77 % (31-73)  


 


Lymphocytes (%) (Auto)   15 % (24-48)  


 


Monocytes (%) (Auto)   7 % (0-9)  


 


Eosinophils (%) (Auto)   1 % (0-3)  


 


Basophils (%) (Auto)   0 % (0-3)  


 


Neutrophils # (Auto)


 


 


 12.3 x10^3/uL


(1.8-7.7) 





 


Lymphocytes # (Auto)


 


 


 2.4 x10^3/uL


(1.0-4.8) 





 


Monocytes # (Auto)


 


 


 1.1 x10^3/uL


(0.0-1.1) 





 


Eosinophils # (Auto)


 


 


 0.1 x10^3/uL


(0.0-0.7) 





 


Basophils # (Auto)


 


 


 0.0 x10^3/uL


(0.0-0.2) 





 


Sodium Level


 


 


 138 mmol/L


(136-145) 





 


Potassium Level


 


 


 3.8 mmol/L


(3.5-5.1) 





 


Chloride Level


 


 


 99 mmol/L


() 





 


Carbon Dioxide Level


 


 


 31 mmol/L


(21-32) 





 


Anion Gap   8 (6-14)  


 


Blood Urea Nitrogen


 


 


 36 mg/dL


(7-20) 





 


Creatinine


 


 


 0.9 mg/dL


(0.6-1.0) 





 


Estimated GFR


(Cockcroft-Gault) 


 


 66.5 


 





 


BUN/Creatinine Ratio   40 (6-20)  


 


Glucose Level


 


 


 146 mg/dL


(70-99) 





 


Calcium Level


 


 


 10.0 mg/dL


(8.5-10.1) 





 


Magnesium Level


 


 


 2.2 mg/dL


(1.8-2.4) 





 


Total Bilirubin


 


 


 0.7 mg/dL


(0.2-1.0) 





 


Aspartate Amino Transf


(AST/SGOT) 


 


 19 U/L (15-37) 


 





 


Alanine Aminotransferase


(ALT/SGPT) 


 


 20 U/L (14-59) 


 





 


Alkaline Phosphatase


 


 


 111 U/L


() 





 


Total Protein


 


 


 7.3 g/dL


(6.4-8.2) 





 


Albumin


 


 


 2.1 g/dL


(3.4-5.0) 





 


Albumin/Globulin Ratio   0.4 (1.0-1.7)  


 


Test


 5/29/20


12:26 


 


 





 


Glucose (Fingerstick)


 189 mg/dL


(70-99) 


 


 











Problem List


Problems


Medical Problems:


(1) Acute pancreatitis


Status: Acute  





(2) Cholelithiasis


Status: Acute  








Assessment/Plan


s/p lap exploration


cont supportive care


doing well


d/c planning ongoing.











GAMAL ZHOU MD             May 29, 2020 13:53

## 2020-05-29 NOTE — NUR
Waiting for [facility] to place PICC line in patient. 

Consent given by patient-- who is A/O X4, 2 RN verification. Faxed to [employer] CALL Supervisor. 



Patient bathed, taken outside today. Anxiety minimal. No fevers. Report given to Silvia CALL.

## 2020-05-29 NOTE — NUR
SS following up with discharge planning. SS reviewed pt chart and discussed with RN, Lilo. 
Pt remains on room air. Pt on IV Zosyn and TPN and has ROBERT drains x3. Pt's daughter has not 
returned SS calls at this time. Pt is improving with PT/OT. SS will continue to follow for 
discharge planning.

## 2020-05-29 NOTE — PDOC
Infectious Disease Note


Subjective:


Subjective





Remains on trach on room air 


off vent


Afebrile last 24-hour


Discussed with RN,





Vital Signs:


Vital Signs





Vital Signs








  Date Time  Temp Pulse Resp B/P (MAP) Pulse Ox O2 Delivery O2 Flow Rate FiO2


 


5/29/20 08:00      Room Air  


 


5/29/20 06:00  115 26 115/65 (82) 94   


 


5/29/20 04:00 99.3       





 99.3       


 


5/28/20 21:34       8.0 











Physical Exam:


PHYSICAL EXAM


GENERAL: Slightly anxious


HEENT:  Oral cavity clear,  NGT


NECK:  Trach present


LUNGS: A lot of coughing with productive sputum from the tracheostomy site


HEART:  S1, S2, regular 


ABDOMEN: mod distention, hypoactive BS, mildly tender, + drains x 2


: [place] (4/14)


EXTREMITIES: Generalized edema, improving, no cyanosis, SCDs bilaterally 


SKIN: Warm and dry.  No generalized rash.  


CNS: Very weak 


RUE-PICC (4/30) clean





Medications:


Inpatient Meds:





Current Medications








 Medications


  (Trade)  Dose


 Ordered  Sig/Yee  Start Time


 Stop Time Status Last Admin


Dose Admin


 


 Acetaminophen


  (Tylenol Supp)  650 mg  PRN Q6HRS  PRN  3/24/20 10:30


    5/5/20 09:12


650 MG


 


 Acetaminophen


  (Tylenol)  650 mg  PRN Q6HRS  PRN  3/21/20 03:36


 5/13/20 10:25 DC 4/16/20 19:56


650 MG


 


 Albumin Human  100 ml @ 


 100 mls/hr  1X PRN  PRN  5/19/20 01:30


     





 


 Albuterol Sulfate


  (Ventolin Neb


 Soln)  2.5 mg  1X  ONCE  3/17/20 22:30


 3/17/20 22:31 DC 3/18/20 00:56


2.5 MG


 


 Alteplase,


 Recombinant


  (Cathflo For


 Central Catheter


 Clearance)  1 mg  1X  ONCE  4/24/20 10:45


 4/24/20 10:46 DC 4/24/20 11:44


1 MG


 


 Amino Acids/


 Glycerin/


 Electrolytes  1,000 ml @ 


 75 mls/hr  [id number]  4/20/20 21:15


   UNV  





 


 Artificial Tears


  (Artificial


 Tears)  1 drop  PRN Q15MIN  PRN  4/29/20 05:30


    5/24/20 11:15


1 DROP


 


 Atenolol


  (Tenormin)  100 mg  DAILY  3/17/20 09:00


 3/16/20 20:08 DC  





 


 Atropine Sulfate


  (ATROPINE 0.5mg


 SYRINGE)  0.5 mg  PRN Q5MIN  PRN  4/2/20 08:15


     





 


 Barium Sulfate


  (Varibar Thin


 Liquid Apple)  148 gm  1X  ONCE  5/26/20 11:45


 5/26/20 11:49 DC  





 


 Benzocaine


  (Hurricaine One)  1 spray  1X  ONCE  3/20/20 14:30


 3/20/20 14:31 DC 3/20/20 16:38


1 SPRAY


 


 Bisacodyl


  (Dulcolax Supp)  10 mg  STK-MED ONCE  4/27/20 10:59


 4/27/20 10:59 DC  





 


 Bumetanide


  (Bumex)  2 mg  DAILY  5/8/20 10:00


 5/18/20 17:15 DC 5/18/20 08:07


2 MG


 


 Bupivacaine HCl/


 Epinephrine Bitart


  (Sensorcain-Epi


 0.5%-1:882125 Mpf)  30 ml  STK-MED ONCE  4/27/20 10:58


 4/27/20 10:58 DC 4/27/20 12:01


7 ML


 


 Calcium Carbonate/


 Glycine


  (Tums)  500 mg  PRN [id number]  PRN  3/18/20 17:45


 5/13/20 10:25 DC  





 


 Calcium Chloride


 1000 mg/Sodium


 Chloride  110 ml @ 


 220 mls/hr  1X  ONCE  3/17/20 22:30


 3/17/20 22:59 DC 3/17/20 22:11


220 MLS/HR


 


 Calcium Chloride


 3000 mg/Sodium


 Chloride  1,030 ml @ 


 50 mls/hr  [id number]  3/19/20 08:00


 3/21/20 15:23 DC 3/21/20 02:17


50 MLS/HR


 


 Calcium Gluconate


  (Calcium


 Gluconate)  2,000 mg  1X  ONCE  3/19/20 02:15


 3/19/20 02:16 DC 3/19/20 02:19


2,000 MG


 


 Calcium Gluconate


 1000 mg/Sodium


 Chloride  110 ml @ 


 220 mls/hr  1X  ONCE  3/18/20 03:30


 3/18/20 03:59 DC 3/18/20 03:21


220 MLS/HR


 


 Calcium Gluconate


 2000 mg/Sodium


 Chloride  120 ml @ 


 220 mls/hr  1X  ONCE  3/18/20 07:30


 3/18/20 08:02 DC 3/18/20 09:05


220 MLS/HR


 


 Cefepime HCl


  (Maxipime)  2 gm  Q12HR  3/25/20 09:00


 4/8/20 09:58 DC 4/7/20 20:56


2 GM


 


 Cellulose


  (Surgicel


 Fibrillar 1x2)  1 each  STK-MED ONCE  4/6/20 11:00


 4/6/20 11:01 DC  





 


 Cellulose


  (Surgicel


 Hemostat 2x14)  1 each  STK-MED ONCE  4/27/20 10:58


 4/27/20 10:59 DC  





 


 Cellulose


  (Surgicel


 Hemostat 4x8)  1 each  STK-MED ONCE  4/27/20 10:58


 4/27/20 10:59 DC  





 


 Cyclobenzaprine


 HCl


  (Flexeril)  10 mg  PRN Q6HRS  PRN  4/30/20 10:45


     





 


 Daptomycin 430 mg/


 Sodium Chloride  50 ml @ 


 100 mls/hr  Q24H  4/25/20 13:00


 4/30/20 20:58 DC 4/30/20 13:00


100 MLS/HR


 


 Daptomycin 450 mg/


 Sodium Chloride  50 ml @ 


 100 mls/hr  Q24H  5/17/20 09:00


 5/21/20 08:30 DC 5/20/20 09:25


100 MLS/HR


 


 Daptomycin 485 mg/


 Sodium Chloride  50 ml @ 


 100 mls/hr  Q24H  5/4/20 11:00


 5/12/20 07:44 DC 5/11/20 13:10


100 MLS/HR


 


 Daptomycin 500 mg/


 Sodium Chloride  50 ml @ 


 100 mls/hr  Q48H  3/25/20 08:30


 4/10/20 10:07 DC 4/10/20 09:57


100 MLS/HR


 


 Dexamethasone


 Sodium Phosphate


  (Decadron)  4 mg  STK-MED ONCE  4/27/20 10:56


 4/27/20 10:57 DC  





 


 Dexmedetomidine


 HCl 400 mcg/


 Sodium Chloride  100 ml @ 0


 mls/hr  CONT  PRN  4/2/20 08:15


    5/29/20 00:00


5.4 MLS/HR


 


 Dextrose


  (Dextrose


 50%-Water Syringe)  12.5 gm  PRN Q15MIN  PRN  3/16/20 09:30


     





 


 Digoxin


  (Lanoxin)  125 mcg  1X  ONCE  3/19/20 18:00


 3/19/20 18:01 DC 3/19/20 17:10


125 MCG


 


 Diphenhydramine


 HCl


  (Benadryl)  25 mg  1X PRN  PRN  4/24/20 15:45


 4/25/20 15:44 DC  





 


 Duloxetine HCl


  (Cymbalta)  30 mg  DAILY  5/10/20 14:00


 5/13/20 10:25 DC 5/11/20 09:48


30 MG


 


 Enoxaparin Sodium


  (Lovenox 100mg


 Syringe)  100 mg  Q12HR  4/21/20 21:00


   UNV  





 


 Enoxaparin Sodium


  (Lovenox 40mg


 Syringe)  40 mg  Q24H  5/17/20 17:00


    5/28/20 17:34


40 MG


 


 Etomidate


  (Amidate)  8 mg  1X  ONCE  3/23/20 08:30


 3/23/20 08:31 DC 3/23/20 08:33


8 MG


 


 Fentanyl Citrate


  (Fentanyl 2ml


 Vial)  25 mcg  PRN Q4HRS  PRN  5/18/20 13:15


    5/28/20 16:38


25 MCG


 


 Fentanyl Citrate


  (Fentanyl 5ml


 Vial)  250 mcg  1X  ONCE  5/8/20 09:15


 5/8/20 09:16 DC 5/8/20 09:30


50 MCG


 


 Furosemide


  (Lasix)  40 mg  [id number]  5/19/20 14:00


    5/28/20 14:31


40 MG


 


 Haloperidol


 Lactate


  (Haldol Inj)  3 mg  1X  ONCE  5/4/20 14:30


 5/4/20 14:31 DC 5/4/20 14:37


3 MG


 


 Heparin Sodium


  (Porcine)


  (Hep Lock Adult)  500 unit  STK-MED ONCE  4/7/20 09:29


 4/7/20 09:30 DC  





 


 Heparin Sodium


  (Porcine)


  (Heparin Sodium)  5,000 unit  Q12HR  4/27/20 21:00


 5/7/20 09:59 DC 5/6/20 20:57


5,000 UNIT


 


 Heparin Sodium


  (Porcine) 1000


 unit/Sodium


 Chloride  1,001 ml @ 


 1,001 mls/hr  1X  ONCE  4/27/20 12:00


 4/27/20 12:59 DC  





 


 Hydromorphone HCl


  (Dilaudid


 Standard PCA)  12 mg  STK-MED ONCE  5/1/20 15:50


 5/12/20 11:24 DC  





 


 Hydromorphone HCl


  (Dilaudid)  1 mg  PRN Q4HRS  PRN  5/4/20 19:00


 5/18/20 17:10 DC 5/18/20 06:25


1 MG


 


 Info


  (CONTRAST GIVEN


 -- Rx MONITORING)  1 each  PRN DAILY  PRN  3/30/20 11:45


 4/1/20 11:44 DC  





 


 Info


  (Icu Electrolyte


 Protocol)  1 ea  CONT PRN  PRN  3/29/20 13:15


     





 


 Info


  (PHARMACY


 MONITORING -- do


 not chart)  1 each  PRN DAILY  PRN  4/24/20 15:45


 5/26/20 14:14 DC  





 


 Info


  (Tpn Per


 Pharmacy)  1 each  PRN DAILY  PRN  3/18/20 12:30


   UNV  





 


 Insulin Human


 Lispro


  (HumaLOG)  0-9 UNITS  Q6HRS  3/16/20 09:30


    5/29/20 00:02


4 UNITS


 


 Insulin Human


 Regular


  (HumuLIN R VIAL)  5 unit  1X  ONCE  3/17/20 22:30


 3/17/20 22:31 DC 3/17/20 22:14


5 UNIT


 


 Iohexol


  (Omnipaque 240


 Mg/ml)  30 ml  1X  ONCE  3/30/20 11:30


 3/30/20 11:33 DC 3/30/20 11:30


30 ML


 


 Iohexol


  (Omnipaque 300


 Mg/ml)  50 ml  STK-MED ONCE  4/27/20 10:58


 4/27/20 10:59 DC  





 


 Iohexol


  (Omnipaque 350


 Mg/ml)  90 ml  1X  ONCE  3/16/20 03:30


 3/16/20 03:31 DC 3/16/20 03:25


90 ML


 


 Ketorolac


 Tromethamine


  (Toradol 30mg


 Vial)  30 mg  1X  ONCE  3/16/20 03:00


 3/16/20 03:01 DC 3/16/20 02:54


30 MG


 


 Lidocaine HCl


  (Buffered


 Lidocaine 1%)  6 ml  1X  ONCE  5/12/20 14:15


 5/12/20 14:16 DC 5/12/20 14:15


3 ML


 


 Lidocaine HCl


  (Glydo


  (Lidocaine) Jelly)  1 thomas  1X  ONCE  3/20/20 14:30


 3/20/20 14:31 DC 3/20/20 16:38


1 THOMAS


 


 Lidocaine HCl


  (Xylocaine-Mpf


 1% 2ml Vial)  2 ml  PRN 1X  PRN  4/27/20 07:00


 4/28/20 06:59 DC  





 


 Linezolid/Dextrose  300 ml @ 


 300 mls/hr  Q12HR  5/17/20 09:00


 5/20/20 08:11 DC 5/19/20 21:08


300 MLS/HR


 


 Lorazepam


  (Ativan Inj)  2 mg  PRN Q4HRS  PRN  5/17/20 19:15


    5/26/20 23:23


2 MG


 


 Magnesium Sulfate  50 ml @ 25


 mls/hr  1X  ONCE  5/10/20 09:00


 5/10/20 10:59 DC 5/10/20 10:44


25 MLS/HR


 


 Meropenem 1 gm/


 Sodium Chloride  100 ml @ 


 200 mls/hr  Q8HRS  4/28/20 14:00


 5/22/20 09:31 DC 5/22/20 05:53


200 MLS/HR


 


 Meropenem 500 mg/


 Sodium Chloride  50 ml @ 


 100 mls/hr  Q6HRS  5/25/20 18:00


 5/27/20 09:59 DC 5/27/20 06:02


100 MLS/HR


 


 Metoclopramide HCl


  (Reglan Vial)  10 mg  PRN Q3HRS  PRN  5/9/20 16:45


    5/14/20 04:25


10 MG


 


 Metoprolol


 Tartrate


  (Lopressor Vial)  5 mg  1X  ONCE  5/17/20 15:15


 5/17/20 15:16 DC 5/17/20 15:31


5 MG


 


 Metronidazole  100 ml @ 


 100 mls/hr  Q8HRS  4/14/20 10:00


 4/21/20 08:10 DC 4/21/20 06:04


100 MLS/HR


 


 Micafungin Sodium


 100 mg/Dextrose  100 ml @ 


 100 mls/hr  Q24H  5/4/20 11:00


 5/27/20 09:59 DC 5/26/20 12:17


100 MLS/HR


 


 Midazolam HCl


  (Versed)  5 mg  1X  ONCE  5/8/20 09:15


 5/8/20 09:16 DC 5/8/20 09:30


1 MG


 


 Midazolam HCl 100


 mg/Sodium Chloride  100 ml @ 7


 mls/hr  CONT  PRN  3/28/20 16:00


    4/8/20 15:35


7 MLS/HR


 


 Midazolam HCl 50


 mg/Sodium Chloride  50 ml @ 0


 mls/hr  CONT  PRN  3/23/20 08:15


 3/28/20 15:59 DC 3/26/20 22:39


7 MLS/HR


 


 Morphine Sulfate


  (Morphine


 Sulfate)  2 mg  PRN Q2HR  PRN  3/16/20 05:00


 3/17/20 14:15 DC 3/17/20 12:26


2 MG


 


 Multi-Ingred


 Cream/Lotion/Oil/


 Oint


  (Artificial


 Tears Eye


 Ointment)  1 thomas  PRN Q1HR  PRN  3/25/20 17:30


    4/13/20 08:19


1 THOMAS


 


 Naloxone HCl


  (Narcan)  0.4 mg  PRN Q2MIN  PRN  4/27/20 14:30


   UNV  





 


 Norepinephrine


 Bitartrate 8 mg/


 Dextrose  258 ml @ 


 17.299 mls/


 hr  CONT  PRN  3/17/20 15:30


 4/17/20 09:19 DC 4/14/20 12:48


20.9 MLS/HR


 


 Ondansetron HCl


  (Zofran)  4 mg  STK-MED ONCE  4/27/20 10:56


 4/27/20 10:57 DC  





 


 Pantoprazole


 Sodium


  (PROTONIX VIAL


 for IV PUSH)  40 mg  DAILYAC  3/16/20 11:30


    5/28/20 08:15


40 MG


 


 Phenylephrine HCl


  (PHENYLEPHRINE


 in 0.9% NACL PF)  1 mg  STK-MED ONCE  4/27/20 12:34


 4/27/20 12:34 DC  





 


 Piperacillin Sod/


 Tazobactam Sod


 3.375 gm/Sodium


 Chloride  50 ml @ 


 100 mls/hr  Q6HRS  5/27/20 12:00


    5/29/20 06:12


100 MLS/HR


 


 Piperacillin Sod/


 Tazobactam Sod


 4.5 gm/Sodium


 Chloride  100 ml @ 


 200 mls/hr  1X  ONCE  3/16/20 06:00


 3/16/20 06:29 DC 3/16/20 05:44


200 MLS/HR


 


 Potassium


 Chloride 110 meq/


 Magnesium Sulfate


 20 meq/


 Multivitamins 10


 ml/Chromium/


 Copper/Manganese/


 Seleni/Zn 1 ml/


 Insulin Human


 Regular 15 unit/


 Total Parenteral


 Nutrition/Amino


 Acids/Dextrose/


 Fat Emulsion


 Intravenous  1,800 ml @ 


 75 mls/hr  TPN  CONT  5/24/20 22:00


 5/25/20 21:59 DC 5/24/20 22:48


75 MLS/HR


 


 Potassium


 Chloride 15 meq/


 Bicarbonate


 Dialysis Soln w/


 out KCl  5,007.5 ml


  @ 1,000 mls/


 hr  Q5H1M  3/29/20 20:00


 4/2/20 13:08 DC 4/1/20 18:14


1,000 MLS/HR


 


 Potassium


 Chloride 20 meq/


 Bicarbonate


 Dialysis Soln w/


 out KCl  5,010 ml @ 


 1,000 mls/hr  Q5H1M  3/25/20 16:00


 3/29/20 19:59 DC 3/29/20 14:54


1,000 MLS/HR


 


 Potassium


 Chloride 70 meq/


 Magnesium Sulfate


 20 meq/


 Multivitamins 10


 ml/Chromium/


 Copper/Manganese/


 Seleni/Zn 1 ml/


 Insulin Human


 Regular 15 unit/


 Total Parenteral


 Nutrition/Amino


 Acids/Dextrose/


 Fat Emulsion


 Intravenous  1,800 ml @ 


 75 mls/hr  TPN  CONT  5/28/20 22:00


 5/29/20 21:59  5/28/20 22:33


75 MLS/HR


 


 Potassium


 Chloride 75 meq/


 Magnesium Sulfate


 15 meq/


 Multivitamins 10


 ml/Chromium/


 Copper/Manganese/


 Seleni/Zn 0.5 ml/


 Insulin Human


 Regular 15 unit/


 Total Parenteral


 Nutrition/Amino


 Acids/Dextrose/


 Fat Emulsion


 Intravenous  1,920 ml @ 


 80 mls/hr  TPN  CONT  5/9/20 22:00


 5/10/20 21:59 DC 5/9/20 22:41


80 MLS/HR


 


 Potassium


 Chloride 75 meq/


 Magnesium Sulfate


 15 meq/Calcium


 Gluconate 8 meq/


 Multivitamins 10


 ml/Chromium/


 Copper/Manganese/


 Seleni/Zn 0.5 ml/


 Insulin Human


 Regular 15 unit/


 Total Parenteral


 Nutrition/Amino


 Acids/Dextrose/


 Fat Emulsion


 Intravenous  1,920 ml @ 


 80 mls/hr  TPN  CONT  5/7/20 22:00


 5/8/20 21:59 DC 5/7/20 22:28


80 MLS/HR


 


 Potassium


 Chloride 75 meq/


 Magnesium Sulfate


 15 meq/Calcium


 Gluconate 8 meq/


 Multivitamins 10


 ml/Chromium/


 Copper/Manganese/


 Seleni/Zn 0.5 ml/


 Insulin Human


 Regular 20 unit/


 Total Parenteral


 Nutrition/Amino


 Acids/Dextrose/


 Fat Emulsion


 Intravenous  1,920 ml @ 


 80 mls/hr  TPN  CONT  5/6/20 22:00


 5/7/20 21:59 DC 5/6/20 22:00


80 MLS/HR


 


 Potassium


 Chloride 75 meq/


 Magnesium Sulfate


 15 meq/Calcium


 Gluconate 8 meq/


 Multivitamins 10


 ml/Chromium/


 Copper/Manganese/


 Seleni/Zn 0.5 ml/


 Insulin Human


 Regular 25 unit/


 Total Parenteral


 Nutrition/Amino


 Acids/Dextrose/


 Fat Emulsion


 Intravenous  1,920 ml @ 


 80 mls/hr  TPN  CONT  5/4/20 22:00


 5/5/20 21:59 DC 5/4/20 23:08


80 MLS/HR


 


 Potassium


 Chloride 75 meq/


 Magnesium Sulfate


 20 meq/Calcium


 Gluconate 10 meq/


 Multivitamins 10


 ml/Chromium/


 Copper/Manganese/


 Seleni/Zn 0.5 ml/


 Insulin Human


 Regular 25 unit/


 Total Parenteral


 Nutrition/Amino


 Acids/Dextrose/


 Fat Emulsion


 Intravenous  1,920 ml @ 


 80 mls/hr  TPN  CONT  5/3/20 22:00


 5/4/20 21:59 DC 5/3/20 22:04


80 MLS/HR


 


 Potassium


 Chloride 75 meq/


 Magnesium Sulfate


 20 meq/Calcium


 Gluconate 10 meq/


 Multivitamins 10


 ml/Chromium/


 Copper/Manganese/


 Seleni/Zn 0.5 ml/


 Insulin Human


 Regular 30 unit/


 Total Parenteral


 Nutrition/Amino


 Acids/Dextrose/


 Fat Emulsion


 Intravenous  1,920 ml @ 


 80 mls/hr  TPN  CONT  5/2/20 22:00


 5/3/20 22:00 DC 5/2/20 21:51


80 MLS/HR


 


 Potassium


 Chloride 80 meq/


 Magnesium Sulfate


 20 meq/


 Multivitamins 10


 ml/Chromium/


 Copper/Manganese/


 Seleni/Zn 0.5 ml/


 Insulin Human


 Regular 15 unit/


 Total Parenteral


 Nutrition/Amino


 Acids/Dextrose/


 Fat Emulsion


 Intravenous  1,920 ml @ 


 80 mls/hr  TPN  CONT  5/12/20 22:00


 5/13/20 21:59 DC 5/12/20 21:40


80 MLS/HR


 


 Potassium


 Chloride 80 meq/


 Magnesium Sulfate


 20 meq/


 Multivitamins 10


 ml/Chromium/


 Copper/Manganese/


 Seleni/Zn 1 ml/


 Insulin Human


 Regular 15 unit/


 Total Parenteral


 Nutrition/Amino


 Acids/Dextrose/


 Fat Emulsion


 Intravenous  1,920 ml @ 


 80 mls/hr  TPN  CONT  5/13/20 22:00


 5/14/20 21:59 DC 5/13/20 22:04


80 MLS/HR


 


 Potassium


 Chloride 90 meq/


 Magnesium Sulfate


 20 meq/


 Multivitamins 10


 ml/Chromium/


 Copper/Manganese/


 Seleni/Zn 1 ml/


 Insulin Human


 Regular 15 unit/


 Total Parenteral


 Nutrition/Amino


 Acids/Dextrose/


 Fat Emulsion


 Intravenous  1,800 ml @ 


 75 mls/hr  TPN  CONT  5/20/20 22:00


 5/21/20 21:59 DC 5/20/20 22:28


75 MLS/HR


 


 Potassium


 Chloride/Water  100 ml @ 


 100 mls/hr  1X  ONCE  5/14/20 11:00


 5/14/20 11:59 DC 5/14/20 11:34


100 MLS/HR


 


 Potassium


 Phosphate 20 mmol/


 Sodium Chloride  106.6667


 ml @ 


 51.667 m...  1X  ONCE  3/25/20 13:00


 3/25/20 15:03 DC 3/25/20 12:51


51.667 MLS/HR


 


 Potassium Acetate


 30 meq/Magnesium


 Sulfate 20 meq/


 Calcium Gluconate


 10 meq/


 Multivitamins 10


 ml/Chromium/


 Copper/Manganese/


 Seleni/Zn 0.5 ml/


 Insulin Human


 Regular 30 unit/


 Potassium


 Chloride 30 meq/


 Total Parenteral


 Nutrition/Amino


 Acids/Dextrose/


 Fat Emulsion


 Intravenous  1,920 ml @ 


 80 mls/hr  TPN  CONT  5/1/20 22:00


 5/2/20 21:59 DC 5/1/20 22:34


80 MLS/HR


 


 Potassium Acetate


 55 meq/Magnesium


 Sulfate 20 meq/


 Calcium Gluconate


 10 meq/


 Multivitamins 10


 ml/Chromium/


 Copper/Manganese/


 Seleni/Zn 0.5 ml/


 Insulin Human


 Regular 30 unit/


 Total Parenteral


 Nutrition/Amino


 Acids/Dextrose/


 Fat Emulsion


 Intravenous  1,920 ml @ 


 80 mls/hr  TPN  CONT  4/30/20 22:00


 5/1/20 21:59 DC 5/1/20 01:00


80 MLS/HR


 


 Potassium Acetate


 55 meq/Magnesium


 Sulfate 20 meq/


 Calcium Gluconate


 10 meq/


 Multivitamins 10


 ml/Chromium/


 Copper/Manganese/


 Seleni/Zn 0.5 ml/


 Insulin Human


 Regular 35 unit/


 Total Parenteral


 Nutrition/Amino


 Acids/Dextrose/


 Fat Emulsion


 Intravenous  1,920 ml @ 


 80 mls/hr  TPN  CONT  4/28/20 22:00


 4/29/20 21:59 DC 4/28/20 22:02


80 MLS/HR


 


 Potassium Acetate


 65 meq/Magnesium


 Sulfate 20 meq/


 Calcium Gluconate


 10 meq/


 Multivitamins 10


 ml/Chromium/


 Copper/Manganese/


 Seleni/Zn 0.5 ml/


 Insulin Human


 Regular 30 unit/


 Total Parenteral


 Nutrition/Amino


 Acids/Dextrose/


 Fat Emulsion


 Intravenous  1,920 ml @ 


 80 mls/hr  TPN  CONT  4/29/20 22:00


 4/30/20 21:59 DC 4/29/20 22:22


80 MLS/HR


 


 Prochlorperazine


 Edisylate


  (Compazine)  5 mg  PACU PRN  PRN  4/27/20 07:00


 4/28/20 06:59 DC  





 


 Propofol  20 ml @ As


 Directed  STK-MED ONCE  4/27/20 12:26


 4/27/20 12:27 DC  





 


 Ringer's Solution  1,000 ml @ 


 30 mls/hr  Q24H  4/27/20 07:00


 4/27/20 18:59 DC  





 


 Rocuronium Bromide


  (Zemuron)  50 mg  STK-MED ONCE  4/27/20 10:56


 4/27/20 10:57 DC  





 


 Saliva Substitute


  (Biotene


 Moisturizing


 Mouth)  2 spray  PRN Q15MIN  PRN  5/21/20 11:00


     





 


 Sevoflurane


  (Ultane)  60 ml  STK-MED ONCE  4/27/20 12:26


 4/27/20 12:27 DC  





 


 Sodium


 Bicarbonate 50


 meq/Sodium


 Chloride  1,050 ml @ 


 75 mls/hr  Q14H  3/18/20 07:30


 3/23/20 10:28 DC 3/22/20 21:10


75 MLS/HR


 


 Sodium Acetate 50


 meq/Potassium


 Acetate 55 meq/


 Magnesium Sulfate


 20 meq/Calcium


 Gluconate 10 meq/


 Multivitamins 10


 ml/Chromium/


 Copper/Manganese/


 Seleni/Zn 0.5 ml/


 Insulin Human


 Regular 35 unit/


 Total Parenteral


 Nutrition/Amino


 Acids/Dextrose/


 Fat Emulsion


 Intravenous  1,800 ml @ 


 75 mls/hr  TPN  CONT  4/25/20 22:00


 4/26/20 21:59 DC 4/25/20 22:03


75 MLS/HR


 


 Sodium Chloride  1,000 ml @ 


 25 mls/hr  Q24H  4/27/20 14:30


   UNV  





 


 Sodium Chloride


  (Normal Saline


 Flush)  3 ml  QSHIFT  PRN  4/27/20 13:45


     





 


 Sodium Chloride


 90 meq/Calcium


 Gluconate 10 meq/


 Multivitamins 10


 ml/Chromium/


 Copper/Manganese/


 Seleni/Zn 0.5 ml/


 Total Parenteral


 Nutrition/Amino


 Acids/Dextrose/


 Fat Emulsion


 Intravenous  1,512 ml @ 


 63 mls/hr  TPN  CONT  3/18/20 22:00


 3/19/20 21:59 DC 3/18/20 22:06


63 MLS/HR


 


 Sodium Chloride


 90 meq/Calcium


 Gluconate 10 meq/


 Multivitamins 10


 ml/Chromium/


 Copper/Manganese/


 Seleni/Zn 1 ml/


 Total Parenteral


 Nutrition/Amino


 Acids/Dextrose/


 Fat Emulsion


 Intravenous  55.005 ml


  @ 2.292


 mls/hr  TPN  CONT  3/18/20 22:00


 3/18/20 12:33 DC  





 


 Sodium Chloride


 90 meq/Magnesium


 Sulfate 10 meq/


 Calcium Gluconate


 20 meq/


 Multivitamins 10


 ml/Chromium/


 Copper/Manganese/


 Seleni/Zn 0.5 ml/


 Total Parenteral


 Nutrition/Amino


 Acids/Dextrose/


 Fat Emulsion


 Intravenous  1,512 ml @ 


 63 mls/hr  TPN  CONT  3/19/20 22:00


 3/20/20 21:59 DC 3/19/20 22:25


63 MLS/HR


 


 Sodium Chloride


 90 meq/Magnesium


 Sulfate 12 meq/


 Calcium Gluconate


 15 meq/


 Multivitamins 10


 ml/Chromium/


 Copper/Manganese/


 Seleni/Zn 0.5 ml/


 Insulin Human


 Regular 25 unit/


 Total Parenteral


 Nutrition/Amino


 Acids/Dextrose/


 Fat Emulsion


 Intravenous  1,400 ml @ 


 58.333 mls/


 hr  TPN  CONT  4/8/20 22:00


 4/9/20 21:59 DC 4/8/20 21:41


58.333 MLS/HR


 


 Sodium Chloride


 90 meq/Potassium


 Chloride 15 meq/


 Magnesium Sulfate


 12 meq/Calcium


 Gluconate 15 meq/


 Multivitamins 10


 ml/Chromium/


 Copper/Manganese/


 Seleni/Zn 0.5 ml/


 Insulin Human


 Regular 25 unit/


 Total Parenteral


 Nutrition/Amino


 Acids/Dextrose/


 Fat Emulsion


 Intravenous  1,400 ml @ 


 58.333 mls/


 hr  TPN  CONT  4/7/20 22:00


 4/8/20 21:59 DC 4/7/20 22:13


58.333 MLS/HR


 


 Sodium Chloride


 90 meq/Potassium


 Chloride 15 meq/


 Potassium


 Phosphate 10 mmol/


 Magnesium Sulfate


 8 meq/Calcium


 Gluconate 15 meq/


 Multivitamins 10


 ml/Chromium/


 Copper/Manganese/


 Seleni/Zn 0.5 ml/


 Insulin Human


 Regular 25 unit/


 Total Parenteral


 Nutrition/Amino


 Acids/Dextrose/


 Fat Emulsion


 Intravenous  1,400 ml @ 


 58.333 mls/


 hr  TPN  CONT  4/5/20 22:00


 4/6/20 21:59 DC 4/5/20 21:20


58.333 MLS/HR


 


 Sodium Chloride


 90 meq/Potassium


 Chloride 15 meq/


 Potassium


 Phosphate 10 mmol/


 Magnesium Sulfate


 10 meq/Calcium


 Gluconate 20 meq/


 Multivitamins 10


 ml/Chromium/


 Copper/Manganese/


 Seleni/Zn 0.5 ml/


 Total Parenteral


 Nutrition/Amino


 Acids/Dextrose/


 Fat Emulsion


 Intravenous  1,400 ml @ 


 58.333 mls/


 hr  TPN  CONT  3/23/20 22:00


 3/24/20 21:59 DC 3/23/20 21:42


58.333 MLS/HR


 


 Sodium Chloride


 90 meq/Potassium


 Chloride 15 meq/


 Potassium


 Phosphate 10 mmol/


 Magnesium Sulfate


 12 meq/Calcium


 Gluconate 15 meq/


 Multivitamins 10


 ml/Chromium/


 Copper/Manganese/


 Seleni/Zn 0.5 ml/


 Insulin Human


 Regular 25 unit/


 Total Parenteral


 Nutrition/Amino


 Acids/Dextrose/


 Fat Emulsion


 Intravenous  1,400 ml @ 


 58.333 mls/


 hr  TPN  CONT  4/6/20 22:00


 4/7/20 21:59 DC 4/6/20 22:24


58.333 MLS/HR


 


 Sodium Chloride


 90 meq/Potassium


 Chloride 15 meq/


 Potassium


 Phosphate 15 mmol/


 Magnesium Sulfate


 10 meq/Calcium


 Gluconate 15 meq/


 Multivitamins 10


 ml/Chromium/


 Copper/Manganese/


 Seleni/Zn 0.5 ml/


 Total Parenteral


 Nutrition/Amino


 Acids/Dextrose/


 Fat Emulsion


 Intravenous  1,400 ml @ 


 58.333 mls/


 hr  TPN  CONT  3/24/20 22:00


 3/25/20 21:59 DC 3/24/20 22:17


58.333 MLS/HR


 


 Sodium Chloride


 90 meq/Potassium


 Chloride 15 meq/


 Potassium


 Phosphate 15 mmol/


 Magnesium Sulfate


 10 meq/Calcium


 Gluconate 20 meq/


 Multivitamins 10


 ml/Chromium/


 Copper/Manganese/


 Seleni/Zn 0.5 ml/


 Total Parenteral


 Nutrition/Amino


 Acids/Dextrose/


 Fat Emulsion


 Intravenous  1,200 ml @ 


 50 mls/hr  TPN  CONT  3/22/20 22:00


 3/22/20 14:17 DC  





 


 Sodium Chloride


 90 meq/Potassium


 Chloride 15 meq/


 Potassium


 Phosphate 18 mmol/


 Magnesium Sulfate


 8 meq/Calcium


 Gluconate 15 meq/


 Multivitamins 10


 ml/Chromium/


 Copper/Manganese/


 Seleni/Zn 0.5 ml/


 Insulin Human


 Regular 10 unit/


 Total Parenteral


 Nutrition/Amino


 Acids/Dextrose/


 Fat Emulsion


 Intravenous  1,400 ml @ 


 58.333 mls/


 hr  TPN  CONT  3/27/20 22:00


 3/28/20 21:59 DC 3/27/20 21:43


58.333 MLS/HR


 


 Sodium Chloride


 90 meq/Potassium


 Chloride 15 meq/


 Potassium


 Phosphate 18 mmol/


 Magnesium Sulfate


 8 meq/Calcium


 Gluconate 15 meq/


 Multivitamins 10


 ml/Chromium/


 Copper/Manganese/


 Seleni/Zn 0.5 ml/


 Insulin Human


 Regular 15 unit/


 Total Parenteral


 Nutrition/Amino


 Acids/Dextrose/


 Fat Emulsion


 Intravenous  1,400 ml @ 


 58.333 mls/


 hr  TPN  CONT  3/30/20 22:00


 3/31/20 21:59 DC 3/30/20 21:47


58.333 MLS/HR


 


 Sodium Chloride


 90 meq/Potassium


 Chloride 15 meq/


 Potassium


 Phosphate 18 mmol/


 Magnesium Sulfate


 8 meq/Calcium


 Gluconate 15 meq/


 Multivitamins 10


 ml/Chromium/


 Copper/Manganese/


 Seleni/Zn 0.5 ml/


 Insulin Human


 Regular 20 unit/


 Total Parenteral


 Nutrition/Amino


 Acids/Dextrose/


 Fat Emulsion


 Intravenous  1,400 ml @ 


 58.333 mls/


 hr  TPN  CONT  4/2/20 22:00


 4/3/20 21:59 DC 4/2/20 22:45


58.333 MLS/HR


 


 Sodium Chloride


 90 meq/Potassium


 Chloride 15 meq/


 Potassium


 Phosphate 18 mmol/


 Magnesium Sulfate


 8 meq/Calcium


 Gluconate 15 meq/


 Multivitamins 10


 ml/Chromium/


 Copper/Manganese/


 Seleni/Zn 0.5 ml/


 Total Parenteral


 Nutrition/Amino


 Acids/Dextrose/


 Fat Emulsion


 Intravenous  1,400 ml @ 


 58.333 mls/


 hr  TPN  CONT  3/26/20 22:00


 3/27/20 21:59 DC 3/26/20 22:00


58.333 MLS/HR


 


 Sodium Chloride


 90 meq/Potassium


 Phosphate 15 mmol/


 Magnesium Sulfate


 12 meq/Calcium


 Gluconate 15 meq/


 Multivitamins 10


 ml/Chromium/


 Copper/Manganese/


 Seleni/Zn 0.5 ml/


 Insulin Human


 Regular 30 unit/


 Total Parenteral


 Nutrition/Amino


 Acids/Dextrose/


 Fat Emulsion


 Intravenous  1,400 ml @ 


 58.333 mls/


 hr  TPN  CONT  4/10/20 22:00


 4/11/20 21:59 DC 4/10/20 21:49


58.333 MLS/HR


 


 Sodium Chloride


 90 meq/Potassium


 Phosphate 15 mmol/


 Magnesium Sulfate


 12 meq/Calcium


 Gluconate 15 meq/


 Multivitamins 10


 ml/Chromium/


 Copper/Manganese/


 Seleni/Zn 0.5 ml/


 Insulin Human


 Regular 40 unit/


 Total Parenteral


 Nutrition/Amino


 Acids/Dextrose/


 Fat Emulsion


 Intravenous  1,400 ml @ 


 58.333 mls/


 hr  TPN  CONT  4/11/20 22:00


 4/12/20 21:59 DC 4/11/20 21:21


58.333 MLS/HR


 


 Sodium Chloride


 90 meq/Potassium


 Phosphate 19 mmol/


 Magnesium Sulfate


 12 meq/Calcium


 Gluconate 15 meq/


 Multivitamins 10


 ml/Chromium/


 Copper/Manganese/


 Seleni/Zn 0.5 ml/


 Insulin Human


 Regular 40 unit/


 Total Parenteral


 Nutrition/Amino


 Acids/Dextrose/


 Fat Emulsion


 Intravenous  1,400 ml @ 


 58.333 mls/


 hr  TPN  CONT  4/12/20 22:00


 4/13/20 21:59 DC 4/12/20 21:54


58.333 MLS/HR


 


 Sodium Chloride


 90 meq/Potassium


 Phosphate 5 mmol/


 Magnesium Sulfate


 12 meq/Calcium


 Gluconate 15 meq/


 Multivitamins 10


 ml/Chromium/


 Copper/Manganese/


 Seleni/Zn 0.5 ml/


 Insulin Human


 Regular 30 unit/


 Total Parenteral


 Nutrition/Amino


 Acids/Dextrose/


 Fat Emulsion


 Intravenous  1,400 ml @ 


 58.333 mls/


 hr  TPN  CONT  4/9/20 22:00


 4/10/20 21:59 DC 4/9/20 22:08


58.333 MLS/HR


 


 Sodium Chloride


 100 meq/Potassium


 Chloride 40 meq/


 Magnesium Sulfate


 15 meq/Calcium


 Gluconate 15 meq/


 Multivitamins 10


 ml/Chromium/


 Copper/Manganese/


 Seleni/Zn 0.5 ml/


 Insulin Human


 Regular 35 unit/


 Total Parenteral


 Nutrition/Amino


 Acids/Dextrose/


 Fat Emulsion


 Intravenous  1,400 ml @ 


 58.333 mls/


 hr  TPN  CONT  4/19/20 22:00


 4/20/20 21:59 DC 4/19/20 22:46


58.333 MLS/HR


 


 Sodium Chloride


 100 meq/Potassium


 Chloride 40 meq/


 Magnesium Sulfate


 20 meq/Calcium


 Gluconate 10 meq/


 Multivitamins 10


 ml/Chromium/


 Copper/Manganese/


 Seleni/Zn 0.5 ml/


 Insulin Human


 Regular 35 unit/


 Total Parenteral


 Nutrition/Amino


 Acids/Dextrose/


 Fat Emulsion


 Intravenous  1,400 ml @ 


 58.333 mls/


 hr  TPN  CONT  4/23/20 22:00


 4/24/20 21:59 DC 4/24/20 00:06


58.333 MLS/HR


 


 Sodium Chloride


 100 meq/Potassium


 Chloride 40 meq/


 Magnesium Sulfate


 20 meq/Calcium


 Gluconate 15 meq/


 Multivitamins 10


 ml/Chromium/


 Copper/Manganese/


 Seleni/Zn 0.5 ml/


 Insulin Human


 Regular 35 unit/


 Total Parenteral


 Nutrition/Amino


 Acids/Dextrose/


 Fat Emulsion


 Intravenous  1,400 ml @ 


 58.333 mls/


 hr  TPN  CONT  4/22/20 22:00


 4/23/20 21:59 DC 4/22/20 22:27


58.333 MLS/HR


 


 Sodium Chloride


 100 meq/Potassium


 Phosphate 10 mmol/


 Magnesium Sulfate


 12 meq/Calcium


 Gluconate 15 meq/


 Multivitamins 10


 ml/Chromium/


 Copper/Manganese/


 Seleni/Zn 0.5 ml/


 Insulin Human


 Regular 35 unit/


 Potassium


 Chloride 20 meq/


 Total Parenteral


 Nutrition/Amino


 Acids/Dextrose/


 Fat Emulsion


 Intravenous  1,400 ml @ 


 58.333 mls/


 hr  TPN  CONT  4/16/20 22:00


 4/17/20 21:59 DC 4/16/20 22:10


58.333 MLS/HR


 


 Sodium Chloride


 100 meq/Potassium


 Phosphate 19 mmol/


 Magnesium Sulfate


 12 meq/Calcium


 Gluconate 15 meq/


 Multivitamins 10


 ml/Chromium/


 Copper/Manganese/


 Seleni/Zn 0.5 ml/


 Insulin Human


 Regular 40 unit/


 Potassium


 Chloride 20 meq/


 Total Parenteral


 Nutrition/Amino


 Acids/Dextrose/


 Fat Emulsion


 Intravenous  1,400 ml @ 


 58.333 mls/


 hr  TPN  CONT  4/15/20 22:00


 4/16/20 21:59 DC 4/15/20 21:20


58.333 MLS/HR


 


 Sodium Chloride


 100 meq/Potassium


 Phosphate 5 mmol/


 Magnesium Sulfate


 12 meq/Calcium


 Gluconate 15 meq/


 Multivitamins 10


 ml/Chromium/


 Copper/Manganese/


 Seleni/Zn 0.5 ml/


 Insulin Human


 Regular 35 unit/


 Potassium


 Chloride 20 meq/


 Total Parenteral


 Nutrition/Amino


 Acids/Dextrose/


 Fat Emulsion


 Intravenous  1,400 ml @ 


 58.333 mls/


 hr  TPN  CONT  4/17/20 22:00


 4/18/20 21:59 DC 4/17/20 22:59


58.333 MLS/HR


 


 Succinylcholine


 Chloride


  (Anectine)  120 mg  1X  ONCE  3/23/20 08:30


 3/23/20 08:31 DC 3/23/20 08:34


120 MG


 


 Vecuronium Bromide


  (Norcuron Bolus)  6 mg  PRN Q6HRS  PRN  5/7/20 19:15


 5/7/20 19:35 DC  














Labs:


Lab





Laboratory Tests








Test


 5/28/20


12:40 5/28/20


17:50 5/28/20


23:45 5/29/20


05:50


 


Glucose (Fingerstick)


 237 mg/dL


(70-99) 171 mg/dL


(70-99) 168 mg/dL


(70-99) 





 


White Blood Count


 


 


 


 16.0 x10^3/uL


(4.0-11.0)


 


Red Blood Count


 


 


 


 2.83 x10^6/uL


(3.50-5.40)


 


Hemoglobin


 


 


 


 8.0 g/dL


(12.0-15.5)


 


Hematocrit


 


 


 


 24.5 %


(36.0-47.0)


 


Mean Corpuscular Volume    86 fL () 


 


Mean Corpuscular Hemoglobin    28 pg (25-35) 


 


Mean Corpuscular Hemoglobin


Concent 


 


 


 33 g/dL


(31-37)


 


Red Cell Distribution Width


 


 


 


 19.2 %


(11.5-14.5)


 


Platelet Count


 


 


 


 440 x10^3/uL


(140-400)


 


Neutrophils (%) (Auto)    77 % (31-73) 


 


Lymphocytes (%) (Auto)    15 % (24-48) 


 


Monocytes (%) (Auto)    7 % (0-9) 


 


Eosinophils (%) (Auto)    1 % (0-3) 


 


Basophils (%) (Auto)    0 % (0-3) 


 


Neutrophils # (Auto)


 


 


 


 12.3 x10^3/uL


(1.8-7.7)


 


Lymphocytes # (Auto)


 


 


 


 2.4 x10^3/uL


(1.0-4.8)


 


Monocytes # (Auto)


 


 


 


 1.1 x10^3/uL


(0.0-1.1)


 


Eosinophils # (Auto)


 


 


 


 0.1 x10^3/uL


(0.0-0.7)


 


Basophils # (Auto)


 


 


 


 0.0 x10^3/uL


(0.0-0.2)


 


Sodium Level


 


 


 


 138 mmol/L


(136-145)


 


Potassium Level


 


 


 


 3.8 mmol/L


(3.5-5.1)


 


Chloride Level


 


 


 


 99 mmol/L


()


 


Carbon Dioxide Level


 


 


 


 31 mmol/L


(21-32)


 


Anion Gap    8 (6-14) 


 


Blood Urea Nitrogen


 


 


 


 36 mg/dL


(7-20)


 


Creatinine


 


 


 


 0.9 mg/dL


(0.6-1.0)


 


Estimated GFR


(Cockcroft-Gault) 


 


 


 66.5 





 


BUN/Creatinine Ratio    40 (6-20) 


 


Glucose Level


 


 


 


 146 mg/dL


(70-99)


 


Calcium Level


 


 


 


 10.0 mg/dL


(8.5-10.1)


 


Total Bilirubin


 


 


 


 0.7 mg/dL


(0.2-1.0)


 


Aspartate Amino Transf


(AST/SGOT) 


 


 


 19 U/L (15-37) 





 


Alanine Aminotransferase


(ALT/SGPT) 


 


 


 20 U/L (14-59) 





 


Alkaline Phosphatase


 


 


 


 111 U/L


()


 


Total Protein


 


 


 


 7.3 g/dL


(6.4-8.2)


 


Albumin


 


 


 


 2.1 g/dL


(3.4-5.0)


 


Albumin/Globulin Ratio    0.4 (1.0-1.7) 


 


Test


 5/29/20


06:18 


 


 





 


Glucose (Fingerstick)


 143 mg/dL


(70-99) 


 


 














Objective:


Assessment:


Fever intermittent could be from underlying pancreatitis vs  aspiration 

pneumonia


Acute pancreatitis with persistent  necrosis


CT a/p 4/9


    Increased ascites. Persistent evidence of necrotizing pancreatitis with 

fluid and phlegmon


   at the pancreas


   4/27 status post ROBERT drain placement; yeast


   5/6 fluid  candida parapsilosis fluid amylase high


Cholelithiasis with thickening of the gallbladder wall.


Leucocytosis 


JED,Hyperkalemia, Metabolic acidosis off dialysis


Acute hypoxic resp failure ,bilateral pleural effusion and atelectasis


hypocalcemia 


Prediabetes


HTN


s/p trach





Plan:


Plan of Care


Leukocytosis again but patient is clinically stable 


Trend WBC


DC PICC line


continue Zosyn May 27,was  on Merrem


DC micafungin/ dapto/zyvox


Aggressive pulmonary toilet


Maintain aspiration precaution


Supportive care


PT and OT as tolerated





Critically ill 


Discussed with nursing staff











IVAN FRANZ MD           May 29, 2020 10:10

## 2020-05-30 VITALS — SYSTOLIC BLOOD PRESSURE: 122 MMHG | DIASTOLIC BLOOD PRESSURE: 67 MMHG

## 2020-05-30 VITALS — DIASTOLIC BLOOD PRESSURE: 63 MMHG | SYSTOLIC BLOOD PRESSURE: 104 MMHG

## 2020-05-30 VITALS — DIASTOLIC BLOOD PRESSURE: 68 MMHG | SYSTOLIC BLOOD PRESSURE: 109 MMHG

## 2020-05-30 VITALS — DIASTOLIC BLOOD PRESSURE: 69 MMHG | SYSTOLIC BLOOD PRESSURE: 111 MMHG

## 2020-05-30 VITALS — DIASTOLIC BLOOD PRESSURE: 81 MMHG | SYSTOLIC BLOOD PRESSURE: 119 MMHG

## 2020-05-30 VITALS — DIASTOLIC BLOOD PRESSURE: 69 MMHG | SYSTOLIC BLOOD PRESSURE: 123 MMHG

## 2020-05-30 VITALS — SYSTOLIC BLOOD PRESSURE: 133 MMHG | DIASTOLIC BLOOD PRESSURE: 70 MMHG

## 2020-05-30 VITALS — SYSTOLIC BLOOD PRESSURE: 117 MMHG | DIASTOLIC BLOOD PRESSURE: 80 MMHG

## 2020-05-30 VITALS — SYSTOLIC BLOOD PRESSURE: 112 MMHG | DIASTOLIC BLOOD PRESSURE: 73 MMHG

## 2020-05-30 VITALS — SYSTOLIC BLOOD PRESSURE: 108 MMHG | DIASTOLIC BLOOD PRESSURE: 66 MMHG

## 2020-05-30 VITALS — DIASTOLIC BLOOD PRESSURE: 97 MMHG | SYSTOLIC BLOOD PRESSURE: 138 MMHG

## 2020-05-30 VITALS — SYSTOLIC BLOOD PRESSURE: 142 MMHG | DIASTOLIC BLOOD PRESSURE: 97 MMHG

## 2020-05-30 VITALS — DIASTOLIC BLOOD PRESSURE: 56 MMHG | SYSTOLIC BLOOD PRESSURE: 108 MMHG

## 2020-05-30 VITALS — SYSTOLIC BLOOD PRESSURE: 106 MMHG | DIASTOLIC BLOOD PRESSURE: 62 MMHG

## 2020-05-30 VITALS — DIASTOLIC BLOOD PRESSURE: 66 MMHG | SYSTOLIC BLOOD PRESSURE: 144 MMHG

## 2020-05-30 VITALS — DIASTOLIC BLOOD PRESSURE: 56 MMHG | SYSTOLIC BLOOD PRESSURE: 99 MMHG

## 2020-05-30 VITALS — DIASTOLIC BLOOD PRESSURE: 66 MMHG | SYSTOLIC BLOOD PRESSURE: 114 MMHG

## 2020-05-30 VITALS — SYSTOLIC BLOOD PRESSURE: 90 MMHG | DIASTOLIC BLOOD PRESSURE: 53 MMHG

## 2020-05-30 LAB
ALBUMIN SERPL-MCNC: 2 G/DL (ref 3.4–5)
ALBUMIN/GLOB SERPL: 0.4 {RATIO} (ref 1–1.7)
ALP SERPL-CCNC: 109 U/L (ref 46–116)
ALT SERPL-CCNC: 14 U/L (ref 14–59)
ANION GAP SERPL CALC-SCNC: 7 MMOL/L (ref 6–14)
AST SERPL-CCNC: 15 U/L (ref 15–37)
BASOPHILS # BLD AUTO: 0 X10^3/UL (ref 0–0.2)
BASOPHILS NFR BLD: 0 % (ref 0–3)
BILIRUB SERPL-MCNC: 0.7 MG/DL (ref 0.2–1)
BUN SERPL-MCNC: 36 MG/DL (ref 7–20)
BUN/CREAT SERPL: 33 (ref 6–20)
CALCIUM SERPL-MCNC: 10 MG/DL (ref 8.5–10.1)
CHLORIDE SERPL-SCNC: 102 MMOL/L (ref 98–107)
CO2 SERPL-SCNC: 31 MMOL/L (ref 21–32)
CREAT SERPL-MCNC: 1.1 MG/DL (ref 0.6–1)
EOSINOPHIL NFR BLD: 0.1 X10^3/UL (ref 0–0.7)
EOSINOPHIL NFR BLD: 1 % (ref 0–3)
ERYTHROCYTE [DISTWIDTH] IN BLOOD BY AUTOMATED COUNT: 19.2 % (ref 11.5–14.5)
GFR SERPLBLD BASED ON 1.73 SQ M-ARVRAT: 52.8 ML/MIN
GLOBULIN SER-MCNC: 4.7 G/DL (ref 2.2–3.8)
GLUCOSE SERPL-MCNC: 143 MG/DL (ref 70–99)
HCT VFR BLD CALC: 23.3 % (ref 36–47)
HGB BLD-MCNC: 7.6 G/DL (ref 12–15.5)
LYMPHOCYTES # BLD: 2.3 X10^3/UL (ref 1–4.8)
LYMPHOCYTES NFR BLD AUTO: 16 % (ref 24–48)
MCH RBC QN AUTO: 28 PG (ref 25–35)
MCHC RBC AUTO-ENTMCNC: 32 G/DL (ref 31–37)
MCV RBC AUTO: 87 FL (ref 79–100)
MONO #: 1.2 X10^3/UL (ref 0–1.1)
MONOCYTES NFR BLD: 9 % (ref 0–9)
NEUT #: 10.6 X10^3/UL (ref 1.8–7.7)
NEUTROPHILS NFR BLD AUTO: 74 % (ref 31–73)
PLATELET # BLD AUTO: 437 X10^3/UL (ref 140–400)
POTASSIUM SERPL-SCNC: 3.5 MMOL/L (ref 3.5–5.1)
PROT SERPL-MCNC: 6.7 G/DL (ref 6.4–8.2)
RBC # BLD AUTO: 2.69 X10^6/UL (ref 3.5–5.4)
SODIUM SERPL-SCNC: 140 MMOL/L (ref 136–145)
WBC # BLD AUTO: 14.2 X10^3/UL (ref 4–11)

## 2020-05-30 RX ADMIN — PANTOPRAZOLE SODIUM SCH MG: 40 INJECTION, POWDER, FOR SOLUTION INTRAVENOUS at 08:57

## 2020-05-30 RX ADMIN — PIPERACILLIN SODIUM AND TAZOBACTAM SODIUM SCH MLS/HR: 3; .375 INJECTION, POWDER, LYOPHILIZED, FOR SOLUTION INTRAVENOUS at 06:17

## 2020-05-30 RX ADMIN — FENTANYL CITRATE PRN MCG: 50 INJECTION INTRAMUSCULAR; INTRAVENOUS at 04:32

## 2020-05-30 RX ADMIN — ENOXAPARIN SODIUM SCH MG: 40 INJECTION SUBCUTANEOUS at 15:45

## 2020-05-30 RX ADMIN — ONDANSETRON PRN MG: 2 INJECTION INTRAMUSCULAR; INTRAVENOUS at 09:18

## 2020-05-30 RX ADMIN — INSULIN LISPRO SCH UNITS: 100 INJECTION, SOLUTION INTRAVENOUS; SUBCUTANEOUS at 06:00

## 2020-05-30 RX ADMIN — PIPERACILLIN SODIUM AND TAZOBACTAM SODIUM SCH MLS/HR: 3; .375 INJECTION, POWDER, LYOPHILIZED, FOR SOLUTION INTRAVENOUS at 16:29

## 2020-05-30 RX ADMIN — INSULIN LISPRO SCH UNITS: 100 INJECTION, SOLUTION INTRAVENOUS; SUBCUTANEOUS at 12:09

## 2020-05-30 RX ADMIN — PIPERACILLIN SODIUM AND TAZOBACTAM SODIUM SCH MLS/HR: 3; .375 INJECTION, POWDER, LYOPHILIZED, FOR SOLUTION INTRAVENOUS at 23:59

## 2020-05-30 RX ADMIN — FENTANYL CITRATE PRN MCG: 50 INJECTION INTRAMUSCULAR; INTRAVENOUS at 09:02

## 2020-05-30 RX ADMIN — Medication PRN EACH: at 09:54

## 2020-05-30 RX ADMIN — FENTANYL CITRATE PRN MCG: 50 INJECTION INTRAMUSCULAR; INTRAVENOUS at 23:59

## 2020-05-30 RX ADMIN — INSULIN LISPRO SCH UNITS: 100 INJECTION, SOLUTION INTRAVENOUS; SUBCUTANEOUS at 16:35

## 2020-05-30 RX ADMIN — PIPERACILLIN SODIUM AND TAZOBACTAM SODIUM SCH MLS/HR: 3; .375 INJECTION, POWDER, LYOPHILIZED, FOR SOLUTION INTRAVENOUS at 00:30

## 2020-05-30 RX ADMIN — DEXMEDETOMIDINE HYDROCHLORIDE PRN MLS/HR: 100 INJECTION, SOLUTION, CONCENTRATE INTRAVENOUS at 00:31

## 2020-05-30 RX ADMIN — FUROSEMIDE SCH MG: 10 INJECTION, SOLUTION INTRAMUSCULAR; INTRAVENOUS at 15:44

## 2020-05-30 RX ADMIN — FENTANYL CITRATE PRN MCG: 50 INJECTION INTRAMUSCULAR; INTRAVENOUS at 19:49

## 2020-05-30 RX ADMIN — PIPERACILLIN SODIUM AND TAZOBACTAM SODIUM SCH MLS/HR: 3; .375 INJECTION, POWDER, LYOPHILIZED, FOR SOLUTION INTRAVENOUS at 11:28

## 2020-05-30 RX ADMIN — PROCHLORPERAZINE EDISYLATE PRN MG: 5 INJECTION INTRAMUSCULAR; INTRAVENOUS at 01:26

## 2020-05-30 RX ADMIN — DEXMEDETOMIDINE HYDROCHLORIDE PRN MLS/HR: 100 INJECTION, SOLUTION, CONCENTRATE INTRAVENOUS at 12:57

## 2020-05-30 RX ADMIN — ONDANSETRON PRN MG: 2 INJECTION INTRAMUSCULAR; INTRAVENOUS at 00:34

## 2020-05-30 RX ADMIN — FUROSEMIDE SCH MG: 10 INJECTION, SOLUTION INTRAMUSCULAR; INTRAVENOUS at 08:57

## 2020-05-30 RX ADMIN — INSULIN LISPRO SCH UNITS: 100 INJECTION, SOLUTION INTRAVENOUS; SUBCUTANEOUS at 00:36

## 2020-05-30 NOTE — PDOC
SURGICAL PROGRESS NOTE


Subjective


Pt resting comfortably


OK to transfer to floor


Vital Signs





Vital Signs








  Date Time  Temp Pulse Resp B/P (MAP) Pulse Ox O2 Delivery O2 Flow Rate FiO2


 


5/30/20 09:02   32  93 Room Air  


 


5/30/20 06:00  99  123/69 (87)    


 


5/30/20 04:00 99.0       





 99.0       








I&O











Intake and Output 


 


 5/30/20





 07:00


 


Intake Total 2613 ml


 


Output Total 2765 ml


 


Balance -152 ml


 


 


 


IV Total 2613 ml


 


Output Urine Total 2700 ml


 


Drainage Total 65 ml








Labs





Laboratory Tests








Test


 5/28/20


17:50 5/28/20


23:45 5/29/20


05:50 5/29/20


06:18


 


Glucose (Fingerstick)


 171 mg/dL


(70-99) 168 mg/dL


(70-99) 


 143 mg/dL


(70-99)


 


White Blood Count


 


 


 16.0 x10^3/uL


(4.0-11.0) 





 


Red Blood Count


 


 


 2.83 x10^6/uL


(3.50-5.40) 





 


Hemoglobin


 


 


 8.0 g/dL


(12.0-15.5) 





 


Hematocrit


 


 


 24.5 %


(36.0-47.0) 





 


Mean Corpuscular Volume   86 fL ()  


 


Mean Corpuscular Hemoglobin   28 pg (25-35)  


 


Mean Corpuscular Hemoglobin


Concent 


 


 33 g/dL


(31-37) 





 


Red Cell Distribution Width


 


 


 19.2 %


(11.5-14.5) 





 


Platelet Count


 


 


 440 x10^3/uL


(140-400) 





 


Neutrophils (%) (Auto)   77 % (31-73)  


 


Lymphocytes (%) (Auto)   15 % (24-48)  


 


Monocytes (%) (Auto)   7 % (0-9)  


 


Eosinophils (%) (Auto)   1 % (0-3)  


 


Basophils (%) (Auto)   0 % (0-3)  


 


Neutrophils # (Auto)


 


 


 12.3 x10^3/uL


(1.8-7.7) 





 


Lymphocytes # (Auto)


 


 


 2.4 x10^3/uL


(1.0-4.8) 





 


Monocytes # (Auto)


 


 


 1.1 x10^3/uL


(0.0-1.1) 





 


Eosinophils # (Auto)


 


 


 0.1 x10^3/uL


(0.0-0.7) 





 


Basophils # (Auto)


 


 


 0.0 x10^3/uL


(0.0-0.2) 





 


Sodium Level


 


 


 138 mmol/L


(136-145) 





 


Potassium Level


 


 


 3.8 mmol/L


(3.5-5.1) 





 


Chloride Level


 


 


 99 mmol/L


() 





 


Carbon Dioxide Level


 


 


 31 mmol/L


(21-32) 





 


Anion Gap   8 (6-14)  


 


Blood Urea Nitrogen


 


 


 36 mg/dL


(7-20) 





 


Creatinine


 


 


 0.9 mg/dL


(0.6-1.0) 





 


Estimated GFR


(Cockcroft-Gault) 


 


 66.5 


 





 


BUN/Creatinine Ratio   40 (6-20)  


 


Glucose Level


 


 


 146 mg/dL


(70-99) 





 


Calcium Level


 


 


 10.0 mg/dL


(8.5-10.1) 





 


Magnesium Level


 


 


 2.2 mg/dL


(1.8-2.4) 





 


Total Bilirubin


 


 


 0.7 mg/dL


(0.2-1.0) 





 


Aspartate Amino Transf


(AST/SGOT) 


 


 19 U/L (15-37) 


 





 


Alanine Aminotransferase


(ALT/SGPT) 


 


 20 U/L (14-59) 


 





 


Alkaline Phosphatase


 


 


 111 U/L


() 





 


Total Protein


 


 


 7.3 g/dL


(6.4-8.2) 





 


Albumin


 


 


 2.1 g/dL


(3.4-5.0) 





 


Albumin/Globulin Ratio   0.4 (1.0-1.7)  


 


Test


 5/29/20


12:26 5/29/20


16:58 5/30/20


00:29 5/30/20


06:00


 


Glucose (Fingerstick)


 189 mg/dL


(70-99) 186 mg/dL


(70-99) 167 mg/dL


(70-99) 





 


White Blood Count


 


 


 


 14.2 x10^3/uL


(4.0-11.0)


 


Red Blood Count


 


 


 


 2.69 x10^6/uL


(3.50-5.40)


 


Hemoglobin


 


 


 


 7.6 g/dL


(12.0-15.5)


 


Hematocrit


 


 


 


 23.3 %


(36.0-47.0)


 


Mean Corpuscular Volume    87 fL () 


 


Mean Corpuscular Hemoglobin    28 pg (25-35) 


 


Mean Corpuscular Hemoglobin


Concent 


 


 


 32 g/dL


(31-37)


 


Red Cell Distribution Width


 


 


 


 19.2 %


(11.5-14.5)


 


Platelet Count


 


 


 


 437 x10^3/uL


(140-400)


 


Neutrophils (%) (Auto)    74 % (31-73) 


 


Lymphocytes (%) (Auto)    16 % (24-48) 


 


Monocytes (%) (Auto)    9 % (0-9) 


 


Eosinophils (%) (Auto)    1 % (0-3) 


 


Basophils (%) (Auto)    0 % (0-3) 


 


Neutrophils # (Auto)


 


 


 


 10.6 x10^3/uL


(1.8-7.7)


 


Lymphocytes # (Auto)


 


 


 


 2.3 x10^3/uL


(1.0-4.8)


 


Monocytes # (Auto)


 


 


 


 1.2 x10^3/uL


(0.0-1.1)


 


Eosinophils # (Auto)


 


 


 


 0.1 x10^3/uL


(0.0-0.7)


 


Basophils # (Auto)


 


 


 


 0.0 x10^3/uL


(0.0-0.2)


 


Sodium Level


 


 


 


 140 mmol/L


(136-145)


 


Potassium Level


 


 


 


 3.5 mmol/L


(3.5-5.1)


 


Chloride Level


 


 


 


 102 mmol/L


()


 


Carbon Dioxide Level


 


 


 


 31 mmol/L


(21-32)


 


Anion Gap    7 (6-14) 


 


Blood Urea Nitrogen


 


 


 


 36 mg/dL


(7-20)


 


Creatinine


 


 


 


 1.1 mg/dL


(0.6-1.0)


 


Estimated GFR


(Cockcroft-Gault) 


 


 


 52.8 





 


BUN/Creatinine Ratio    33 (6-20) 


 


Glucose Level


 


 


 


 143 mg/dL


(70-99)


 


Calcium Level


 


 


 


 10.0 mg/dL


(8.5-10.1)


 


Total Bilirubin


 


 


 


 0.7 mg/dL


(0.2-1.0)


 


Aspartate Amino Transf


(AST/SGOT) 


 


 


 15 U/L (15-37) 





 


Alanine Aminotransferase


(ALT/SGPT) 


 


 


 14 U/L (14-59) 





 


Alkaline Phosphatase


 


 


 


 109 U/L


()


 


Total Protein


 


 


 


 6.7 g/dL


(6.4-8.2)


 


Albumin


 


 


 


 2.0 g/dL


(3.4-5.0)


 


Albumin/Globulin Ratio    0.4 (1.0-1.7) 


 


Test


 5/30/20


06:13 


 


 





 


Glucose (Fingerstick)


 130 mg/dL


(70-99) 


 


 











Laboratory Tests








Test


 5/29/20


16:58 5/30/20


00:29 5/30/20


06:00 5/30/20


06:13


 


Glucose (Fingerstick)


 186 mg/dL


(70-99) 167 mg/dL


(70-99) 


 130 mg/dL


(70-99)


 


White Blood Count


 


 


 14.2 x10^3/uL


(4.0-11.0) 





 


Red Blood Count


 


 


 2.69 x10^6/uL


(3.50-5.40) 





 


Hemoglobin


 


 


 7.6 g/dL


(12.0-15.5) 





 


Hematocrit


 


 


 23.3 %


(36.0-47.0) 





 


Mean Corpuscular Volume   87 fL ()  


 


Mean Corpuscular Hemoglobin   28 pg (25-35)  


 


Mean Corpuscular Hemoglobin


Concent 


 


 32 g/dL


(31-37) 





 


Red Cell Distribution Width


 


 


 19.2 %


(11.5-14.5) 





 


Platelet Count


 


 


 437 x10^3/uL


(140-400) 





 


Neutrophils (%) (Auto)   74 % (31-73)  


 


Lymphocytes (%) (Auto)   16 % (24-48)  


 


Monocytes (%) (Auto)   9 % (0-9)  


 


Eosinophils (%) (Auto)   1 % (0-3)  


 


Basophils (%) (Auto)   0 % (0-3)  


 


Neutrophils # (Auto)


 


 


 10.6 x10^3/uL


(1.8-7.7) 





 


Lymphocytes # (Auto)


 


 


 2.3 x10^3/uL


(1.0-4.8) 





 


Monocytes # (Auto)


 


 


 1.2 x10^3/uL


(0.0-1.1) 





 


Eosinophils # (Auto)


 


 


 0.1 x10^3/uL


(0.0-0.7) 





 


Basophils # (Auto)


 


 


 0.0 x10^3/uL


(0.0-0.2) 





 


Sodium Level


 


 


 140 mmol/L


(136-145) 





 


Potassium Level


 


 


 3.5 mmol/L


(3.5-5.1) 





 


Chloride Level


 


 


 102 mmol/L


() 





 


Carbon Dioxide Level


 


 


 31 mmol/L


(21-32) 





 


Anion Gap   7 (6-14)  


 


Blood Urea Nitrogen


 


 


 36 mg/dL


(7-20) 





 


Creatinine


 


 


 1.1 mg/dL


(0.6-1.0) 





 


Estimated GFR


(Cockcroft-Gault) 


 


 52.8 


 





 


BUN/Creatinine Ratio   33 (6-20)  


 


Glucose Level


 


 


 143 mg/dL


(70-99) 





 


Calcium Level


 


 


 10.0 mg/dL


(8.5-10.1) 





 


Total Bilirubin


 


 


 0.7 mg/dL


(0.2-1.0) 





 


Aspartate Amino Transf


(AST/SGOT) 


 


 15 U/L (15-37) 


 





 


Alanine Aminotransferase


(ALT/SGPT) 


 


 14 U/L (14-59) 


 





 


Alkaline Phosphatase


 


 


 109 U/L


() 





 


Total Protein


 


 


 6.7 g/dL


(6.4-8.2) 





 


Albumin


 


 


 2.0 g/dL


(3.4-5.0) 





 


Albumin/Globulin Ratio   0.4 (1.0-1.7)  








Problem List


Problems


Medical Problems:


(1) Acute pancreatitis


Status: Acute  





(2) Cholelithiasis


Status: Acute  

















GAMAL ZHOU MD             May 30, 2020 13:22

## 2020-05-30 NOTE — PDOC
[facility] PROGRESS NOTE


Chief Complaint


Chief Complaint


Acute hypoxic Respiratory failure requiring mechanical ventilation (now 

extubated for several days but still with tracheostomy)


Tracheostomy


bilateral pleural effusions/pulm edema 


Sepsis


Severe Acute gallstone pancreatitis (not a surgical candidate at this time) with

necrosis


Acute kidney failure now requiring dialysis


Salpingitis


Gallstones (Calculus of gallbladder with acute cholecystitis without obstruc

tion)


HTN 


Leukocytosis 


Hypoxia


Uterine fibroid


Intractable pain


Intractable nausea


Covid 19 negative. 


Acute on chronic anemia 


EEG: No seizure activityFever  - better currently - intermittent could be from 

underlying pancreatitis blood cults 5/4 - neg so far


? Ileus with vomiting


Abd distention - U/S and CT reviewed s/p 0.4 L of opaque, debris-containing 

ascites was removed 5/6


Acute pancreatitis with persistent necrosis


- 4/27 status post ROBERT drain placement + C paropsilosis. s/p additional drains 

5/8


Anemia - S/p PRBCs


Cholelithiasis with thickening of the gallbladder wall.


Leucocytosis improving


JED, hyperkalemia, Metabolic acidosis off dialysis


Acute hypoxic resp failure ,bilateral pleural effusion and atelectasis


hypocalcemia 


Prediabetes


HTN


s/p trach


ESRD on HD


Hyperglycemia





History of Present Illness


History of Present Illness


5/30/2020


Patient seen and examined in the ICU


She is wiping her face with a cough


Discussed with RN


Chart reviewed


We hope to get her out of the ICU later today if possible








5/29/2020


Patient seen and examined in the ICU once again


She is back on NG suction


On IV Zosyn


Has IV TPN


Sedated with Precedex but anxious still


Appears somewhat clammy and pale


Chart reviewed


Discussed with RN


She remains critically ill














5/28/2020


Patient seen and examined in the ICU


She has an NG that is clamped we are hoping to start some clear liquids but she 

looks quite ill


She is on IV TPN


Meropenem changed to IV Zosyn (agree)


She has [place] to bedside drainage


She is semi-sedated with Precedex


Chart reviewed


Discussed with RN


She remains critically ill











Seen bedside. Hb 8. She was just a bit hypoxic, had a mucous plug suctioned. 

Able to vocalize well with speaking valve, tells me we are being very hard on 

her and she would like to be drugged back to sleep.  She wants to wake up and 

feel better. On trach shield, 


T max 100.4. afebrile this a.m.





5/26/2020


Patient seen and examined in the ICU


She is up in the chair very frail trying to talk a little shaky


Discussed with RN


Chart reviewed








5/25/2020


Patient seen and examined in the ICU


Patient up in the chair


Having a severe coughing episode with a lot of phlegm coming out of her 

tracheostomy


Discussed with RN


Discussed with physical therapy


Chart reviewed











5/24,.    feels well,  has been out of room in wheelchair


no complaint,    still weak,   some with not wanting to wear her valve on trach


cont other,   may be able to work with speech tomorrow,    videoswallow OK to 

try, 








5/23,  anxiety is up today,   she dislikes the valve still,    


shower and outside today


.   





5/22 she doesnt want to wear her passy-kristan valve,  discussed str and plan with 

her.


speech following,  needs swallow study,   but needs to wear her valve longer,  


cont current


able to walk some, walker 





5/21  stronger,   better,   


we discussed better oral care


she would like to try swallow study,  wants to try to eat,    speech is 

following








5/20/2020 


She remains in the ICU


sitting up and working with OT,   getting better


if limit pain meds, may do better off the vent, 





Nurses trying to suction her,  that is also improved, 


Chart reviewed


 








5/19/2020


Patient seen and examined in the ICU


She had an episode yesterday of tachycardia and severe agitation we gave her 

some Ativan


After that she seemed to have stroke symptoms but now that the Ativan has wore 

off her stroke symptoms have resolved


 


 


She is on IV meropenem and daptomycin and micafungin


Chart reviewed


Discussed with RN


Patient is still critically ill


 


BRIEF OPERATIVE NOTE


Pre-Op Diagnosis


Pancreatitis with pseudocysts, suspected infection


Post-Op Diagnosis


same


Procedure Performed


CT abdominal Drains x 3


Surgeon


Hardy


Anesthesia Type:  Conscious Sedation


Findings


3 abdominal drains, 14F, with turbid pancreatic fluid and necrotic debris in 

each.


Complications


No immediate








5/9: Patient today somewhat restless and having bilious secretions from ET tube,

imaging studies ordered, discussed with consultant. Pretty poor prognosis, 

hopefully is not a fistula, poor surgical candidate. 





5/10: Imaging with no acute events, she seems more stable today compared to 

yesterday. Encouraged as much activity as possible patient at high risk for 

severe depression.





Vitals/I&O


Vitals/I&O:





                                   Vital Signs








  Date Time  Temp Pulse Resp B/P (MAP) Pulse Ox O2 Delivery O2 Flow Rate FiO2


 


5/30/20 09:02   32  93 Room Air  


 


5/30/20 06:00  99  123/69 (87)    


 


5/30/20 04:00 99.0       





 99.0       














                                    I & O   


 


 5/29/20 5/29/20 5/30/20





 15:00 23:00 07:00


 


Intake Total 50 ml 1837 ml 726 ml


 


Output Total 1460 ml 935 ml 370 ml


 


Balance -1410 ml 902 ml 356 ml











Physical Exam


Physical Exam:


GENERAL: Propped up in bed, alert, slightly anxious


HEENT:  Oral cavity clear,  NGT


NECK:  Trach present


LUNGS: nonlabored 


HEART:  S1, S2, regular 


ABDOMEN: mod distention, hypoactive BS, mildly tender, + drains 


: [place] (4/14)


EXTREMITIES: Generalized edema, improving, no cyanosis


SKIN: Warm and dry.  No generalized rash.  


CNS: Very weak 


RUE-PICC (4/30) clean


new LUE-PICC clean


General:  Alert, Cooperative, No acute distress


Heart:  Regular rate, Normal S1, Normal S2, No murmurs, Gallops


Lungs:  Other (Good air movement but having a lot of productive sputum from her 

tracheostomy site)


Abdomen:  Soft, No tenderness


Extremities:  No clubbing, No cyanosis, No edema, Normal pulses, No 

tenderness/swelling


Skin:  Other (warm, dry)





Labs


Labs:





Laboratory Tests








Test


 5/29/20


16:58 5/30/20


00:29 5/30/20


06:00 5/30/20


06:13


 


Glucose (Fingerstick)


 186 mg/dL


(70-99) 167 mg/dL


(70-99) 


 130 mg/dL


(70-99)


 


White Blood Count


 


 


 14.2 x10^3/uL


(4.0-11.0) 





 


Red Blood Count


 


 


 2.69 x10^6/uL


(3.50-5.40) 





 


Hemoglobin


 


 


 7.6 g/dL


(12.0-15.5) 





 


Hematocrit


 


 


 23.3 %


(36.0-47.0) 





 


Mean Corpuscular Volume   87 fL ()  


 


Mean Corpuscular Hemoglobin   28 pg (25-35)  


 


Mean Corpuscular Hemoglobin


Concent 


 


 32 g/dL


(31-37) 





 


Red Cell Distribution Width


 


 


 19.2 %


(11.5-14.5) 





 


Platelet Count


 


 


 437 x10^3/uL


(140-400) 





 


Neutrophils (%) (Auto)   74 % (31-73)  


 


Lymphocytes (%) (Auto)   16 % (24-48)  


 


Monocytes (%) (Auto)   9 % (0-9)  


 


Eosinophils (%) (Auto)   1 % (0-3)  


 


Basophils (%) (Auto)   0 % (0-3)  


 


Neutrophils # (Auto)


 


 


 10.6 x10^3/uL


(1.8-7.7) 





 


Lymphocytes # (Auto)


 


 


 2.3 x10^3/uL


(1.0-4.8) 





 


Monocytes # (Auto)


 


 


 1.2 x10^3/uL


(0.0-1.1) 





 


Eosinophils # (Auto)


 


 


 0.1 x10^3/uL


(0.0-0.7) 





 


Basophils # (Auto)


 


 


 0.0 x10^3/uL


(0.0-0.2) 





 


Sodium Level


 


 


 140 mmol/L


(136-145) 





 


Potassium Level


 


 


 3.5 mmol/L


(3.5-5.1) 





 


Chloride Level


 


 


 102 mmol/L


() 





 


Carbon Dioxide Level


 


 


 31 mmol/L


(21-32) 





 


Anion Gap   7 (6-14)  


 


Blood Urea Nitrogen


 


 


 36 mg/dL


(7-20) 





 


Creatinine


 


 


 1.1 mg/dL


(0.6-1.0) 





 


Estimated GFR


(Cockcroft-Gault) 


 


 52.8 


 





 


BUN/Creatinine Ratio   33 (6-20)  


 


Glucose Level


 


 


 143 mg/dL


(70-99) 





 


Calcium Level


 


 


 10.0 mg/dL


(8.5-10.1) 





 


Total Bilirubin


 


 


 0.7 mg/dL


(0.2-1.0) 





 


Aspartate Amino Transf


(AST/SGOT) 


 


 15 U/L (15-37) 


 





 


Alanine Aminotransferase


(ALT/SGPT) 


 


 14 U/L (14-59) 


 





 


Alkaline Phosphatase


 


 


 109 U/L


() 





 


Total Protein


 


 


 6.7 g/dL


(6.4-8.2) 





 


Albumin


 


 


 2.0 g/dL


(3.4-5.0) 





 


Albumin/Globulin Ratio   0.4 (1.0-1.7)  











Assessment and Plan


Assessmemt and Plan


Problems


Medical Problems:


(1) Acute pancreatitis


Status: Acute  





(2) Cholelithiasis


Status: Acute  





Acute hypoxic Respiratory failure requiring mechanical ventilation was initially

intubated on 3/23 was off for couple of days now back on


Severe gallstone pancreatitis (not a surgical candidate at this time) with 

necrosis


Tracheostomy


bilateral pleural effusions/pulm edema 


Severe sepsis


Acute kidney failure now requiring dialysis


Salpingitis


Gallstones (Calculus of gallbladder with acute cholecystitis without 

obstruction)


HTN 


Leukocytosis 


Hypoxia


Uterine fibroid


Intractable pain


Intractable nausea


Covid 19 negative. 


Acute on chronic anemia 


EEG: No seizure activityFever  - better currently - intermittent could be from 

underlying pancreatitis blood cults 5/4 - neg so far


? Ileus with vomiting


Abd distention - U/S and CT reviewed s/p 0.4 L of opaque, debris-containing 

ascites was removed 5/6


Acute pancreatitis with persistent necrosis


- 4/27 status post ROBERT drain placement + C paropsilosis. s/p additional drains 

5/8


Anemia - S/p PRBCs


Cholelithiasis with thickening of the gallbladder wall.


Leucocytosis improving


JED, hyperkalemia, Metabolic acidosis off dialysis


Acute hypoxic resp failure ,bilateral pleural effusion and atelectasis


hypocalcemia 


Prediabetes


HTN


s/p trach


ESRD on HD


Hyperglycemia








Plan


ICU monitoring (we are going to try to transfer her out this afternoon)


Wound care


Hold off on Ativan for now as she gets too weak with it


Humidified O2 via nasal cannula for now but we can use trach shield as backup


NG suctioning


Nebulizers


Continue IV antibiotics and micafungin


Sedation with Precedex PRN


TPN protocol


Continue [place] to bedside drainage


Tracheostomy care


Hope to eventually move towards decannulation (we have a speaking valve for now)


Trend labs


Appreciate subspecialist input


I am still very concerned about her long-term prognosis ! (she scored a 9 on 

Vandana criteria 3 weeks ago). 


Total time 32-minute





Comment


Review of Relevant


I have reviewed the following items josy (where applicable) has been applied.


Medications:





Current Medications








 Medications


  (Trade)  Dose


 Ordered  Sig/Yee


 Route


 PRN Reason  Start Time


 Stop Time Status Last Admin


Dose Admin


 


 Potassium


 Chloride 70 meq/


 Magnesium Sulfate


 20 meq/


 Multivitamins 10


 ml/Chromium/


 Copper/Manganese/


 Seleni/Zn 1 ml/


 Insulin Human


 Regular 15 unit/


 Total Parenteral


 Nutrition/Amino


 Acids/Dextrose/


 Fat Emulsion


 Intravenous  1,800 ml @ 


 75 mls/hr  TPN  CONT


 IV


   5/29/20 22:00


 5/30/20 21:59  5/29/20 23:13














Hemodynamically unstable?:  No


Is patient in severe pain?:  No


Is NPO status required?:  No











HECTOR MASON III DO           May 30, 2020 12:46

## 2020-05-30 NOTE — PDOC
Infectious Disease Note


Subjective


Subjective


s/p new LUE-PICC 


Trach, on room air


+ nausea


Minimal drain output


No fevers las t 48 hrs





ROS


ROS


as mentioned above





Vital Sign


Vital Signs





Vital Signs








  Date Time  Temp Pulse Resp B/P (MAP) Pulse Ox O2 Delivery O2 Flow Rate FiO2


 


5/30/20 09:02   32  93 Room Air  


 


5/30/20 06:00  99  123/69 (87)    


 


5/30/20 04:00 99.0       





 99.0       











Physical Exam


PHYSICAL EXAM


GENERAL: Propped up in bed, alert, slightly anxious


HEENT:  Oral cavity clear,  NGT


NECK:  Trach present


LUNGS: nonlabored 


HEART:  S1, S2, regular 


ABDOMEN: mod distention, hypoactive BS, mildly tender, + drains 


: [place] (4/14)


EXTREMITIES: Generalized edema, improving, no cyanosis


SKIN: Warm and dry.  No generalized rash.  


CNS: Very weak 


RUE-PICC (4/30) clean


new LUE-PICC clean





Labs


Lab





Laboratory Tests








Test


 5/29/20


12:26 5/29/20


16:58 5/30/20


00:29 5/30/20


06:00


 


Glucose (Fingerstick)


 189 mg/dL


(70-99) 186 mg/dL


(70-99) 167 mg/dL


(70-99) 





 


White Blood Count


 


 


 


 14.2 x10^3/uL


(4.0-11.0)


 


Red Blood Count


 


 


 


 2.69 x10^6/uL


(3.50-5.40)


 


Hemoglobin


 


 


 


 7.6 g/dL


(12.0-15.5)


 


Hematocrit


 


 


 


 23.3 %


(36.0-47.0)


 


Mean Corpuscular Volume    87 fL () 


 


Mean Corpuscular Hemoglobin    28 pg (25-35) 


 


Mean Corpuscular Hemoglobin


Concent 


 


 


 32 g/dL


(31-37)


 


Red Cell Distribution Width


 


 


 


 19.2 %


(11.5-14.5)


 


Platelet Count


 


 


 


 437 x10^3/uL


(140-400)


 


Neutrophils (%) (Auto)    74 % (31-73) 


 


Lymphocytes (%) (Auto)    16 % (24-48) 


 


Monocytes (%) (Auto)    9 % (0-9) 


 


Eosinophils (%) (Auto)    1 % (0-3) 


 


Basophils (%) (Auto)    0 % (0-3) 


 


Neutrophils # (Auto)


 


 


 


 10.6 x10^3/uL


(1.8-7.7)


 


Lymphocytes # (Auto)


 


 


 


 2.3 x10^3/uL


(1.0-4.8)


 


Monocytes # (Auto)


 


 


 


 1.2 x10^3/uL


(0.0-1.1)


 


Eosinophils # (Auto)


 


 


 


 0.1 x10^3/uL


(0.0-0.7)


 


Basophils # (Auto)


 


 


 


 0.0 x10^3/uL


(0.0-0.2)


 


Sodium Level


 


 


 


 140 mmol/L


(136-145)


 


Potassium Level


 


 


 


 3.5 mmol/L


(3.5-5.1)


 


Chloride Level


 


 


 


 102 mmol/L


()


 


Carbon Dioxide Level


 


 


 


 31 mmol/L


(21-32)


 


Anion Gap    7 (6-14) 


 


Blood Urea Nitrogen


 


 


 


 36 mg/dL


(7-20)


 


Creatinine


 


 


 


 1.1 mg/dL


(0.6-1.0)


 


Estimated GFR


(Cockcroft-Gault) 


 


 


 52.8 





 


BUN/Creatinine Ratio    33 (6-20) 


 


Glucose Level


 


 


 


 143 mg/dL


(70-99)


 


Calcium Level


 


 


 


 10.0 mg/dL


(8.5-10.1)


 


Total Bilirubin


 


 


 


 0.7 mg/dL


(0.2-1.0)


 


Aspartate Amino Transf


(AST/SGOT) 


 


 


 15 U/L (15-37) 





 


Alanine Aminotransferase


(ALT/SGPT) 


 


 


 14 U/L (14-59) 





 


Alkaline Phosphatase


 


 


 


 109 U/L


()


 


Total Protein


 


 


 


 6.7 g/dL


(6.4-8.2)


 


Albumin


 


 


 


 2.0 g/dL


(3.4-5.0)


 


Albumin/Globulin Ratio    0.4 (1.0-1.7) 


 


Test


 5/30/20


06:13 


 


 





 


Glucose (Fingerstick)


 130 mg/dL


(70-99) 


 


 








IMPRESSION: 


1. Small bilateral pleural effusions are mildly improved.


Micro


5/4. BLOOD CULTURE  Preliminary  


        NO GROWTH AFTER 5 DAYS 








4/27. abd fluid


  AEROBIC RES 1  Final  


        Organism Identification, Yeast


          Candida parapsilosis








5/6. abd fluid


     ANAEROBIC-AEROBIC CULTURE  


                    PENDING





Objective


Assessment


Fever intermittent could be from underlying pancreatitis vs aspiration, better 


? Ileus with vomiting


Abd distention - U/S and CT reviewed s/p 0.4 L of opaque, debris-containing 

ascites was removed 5/6


Acute pancreatitis with persistent necrosis


  - 4/27 status post ROBERT drain placement + C paropsilosis; 5/6 + yeast & high 

amylase; s/p additional drains on 5/8


Anemia - S/p PRBCs


Cholelithiasis with thickening of the gallbladder wall.


Leucocytosis improved 


JED, hyperkalemia, Metabolic acidosis off dialysis


Acute hypoxic resp failure ,bilateral pleural effusion and atelectasis


hypocalcemia 


Prediabetes


HTN


s/p trach





Plan


Plan of Care


Leukocytosis again but patient is clinically stable 


Trend WBC


DC RUE PICC line


continue Zosyn May 27


Aggressive pulmonary toilet


Maintain aspiration precaution


Supportive care


PT and OT as tolerated





Critically ill 


Discussed with nursing staff


Attending Co-Sign


The patient was seen and interviewed as well as examined at the bedside. The 

chart was reviewed. The case was discussed. Agree with the plan of care.











HAVEN WINTERS        May 30, 2020 09:55


LINN FRANZ MD               May 30, 2020 12:40

## 2020-05-30 NOTE — PDOC
PULMONARY PROGRESS NOTES


Subjective


Patient intubated on 3/23 , s/p trach 4/6,


Remains on TS, tolerated pmv for short period of time 


pt is walking her


Vitals





Vital Signs








  Date Time  Temp Pulse Resp B/P (MAP) Pulse Ox O2 Delivery O2 Flow Rate FiO2


 


5/30/20 09:02   32  93 Room Air  


 


5/30/20 06:00  99  123/69 (87)    


 


5/30/20 04:00 99.0       





 99.0       








ROS:  No Chest Pain, No Abdominal Pain, No Increase Cough


General:  Alert, No acute distress


HEENT:  Other (trach midline )


Lungs:  Other (Good air movement but having a lot of productive sputum from her 

tracheostomy site)


Cardiovascular:  S1, S2


Abdomen:  Soft, Non-tender


Neuro Exam:  Alert


Extremities:  Other (+3 generalized edema )


Skin:  Warm, Dry


Labs





Laboratory Tests








Test


 5/28/20


12:40 5/28/20


17:50 5/28/20


23:45 5/29/20


05:50


 


Glucose (Fingerstick)


 237 mg/dL


(70-99) 171 mg/dL


(70-99) 168 mg/dL


(70-99) 





 


White Blood Count


 


 


 


 16.0 x10^3/uL


(4.0-11.0)


 


Red Blood Count


 


 


 


 2.83 x10^6/uL


(3.50-5.40)


 


Hemoglobin


 


 


 


 8.0 g/dL


(12.0-15.5)


 


Hematocrit


 


 


 


 24.5 %


(36.0-47.0)


 


Mean Corpuscular Volume    86 fL () 


 


Mean Corpuscular Hemoglobin    28 pg (25-35) 


 


Mean Corpuscular Hemoglobin


Concent 


 


 


 33 g/dL


(31-37)


 


Red Cell Distribution Width


 


 


 


 19.2 %


(11.5-14.5)


 


Platelet Count


 


 


 


 440 x10^3/uL


(140-400)


 


Neutrophils (%) (Auto)    77 % (31-73) 


 


Lymphocytes (%) (Auto)    15 % (24-48) 


 


Monocytes (%) (Auto)    7 % (0-9) 


 


Eosinophils (%) (Auto)    1 % (0-3) 


 


Basophils (%) (Auto)    0 % (0-3) 


 


Neutrophils # (Auto)


 


 


 


 12.3 x10^3/uL


(1.8-7.7)


 


Lymphocytes # (Auto)


 


 


 


 2.4 x10^3/uL


(1.0-4.8)


 


Monocytes # (Auto)


 


 


 


 1.1 x10^3/uL


(0.0-1.1)


 


Eosinophils # (Auto)


 


 


 


 0.1 x10^3/uL


(0.0-0.7)


 


Basophils # (Auto)


 


 


 


 0.0 x10^3/uL


(0.0-0.2)


 


Sodium Level


 


 


 


 138 mmol/L


(136-145)


 


Potassium Level


 


 


 


 3.8 mmol/L


(3.5-5.1)


 


Chloride Level


 


 


 


 99 mmol/L


()


 


Carbon Dioxide Level


 


 


 


 31 mmol/L


(21-32)


 


Anion Gap    8 (6-14) 


 


Blood Urea Nitrogen


 


 


 


 36 mg/dL


(7-20)


 


Creatinine


 


 


 


 0.9 mg/dL


(0.6-1.0)


 


Estimated GFR


(Cockcroft-Gault) 


 


 


 66.5 





 


BUN/Creatinine Ratio    40 (6-20) 


 


Glucose Level


 


 


 


 146 mg/dL


(70-99)


 


Calcium Level


 


 


 


 10.0 mg/dL


(8.5-10.1)


 


Magnesium Level


 


 


 


 2.2 mg/dL


(1.8-2.4)


 


Total Bilirubin


 


 


 


 0.7 mg/dL


(0.2-1.0)


 


Aspartate Amino Transf


(AST/SGOT) 


 


 


 19 U/L (15-37) 





 


Alanine Aminotransferase


(ALT/SGPT) 


 


 


 20 U/L (14-59) 





 


Alkaline Phosphatase


 


 


 


 111 U/L


()


 


Total Protein


 


 


 


 7.3 g/dL


(6.4-8.2)


 


Albumin


 


 


 


 2.1 g/dL


(3.4-5.0)


 


Albumin/Globulin Ratio    0.4 (1.0-1.7) 


 


Test


 5/29/20


06:18 5/29/20


12:26 5/29/20


16:58 5/30/20


00:29


 


Glucose (Fingerstick)


 143 mg/dL


(70-99) 189 mg/dL


(70-99) 186 mg/dL


(70-99) 167 mg/dL


(70-99)


 


Test


 5/30/20


06:00 5/30/20


06:13 


 





 


White Blood Count


 14.2 x10^3/uL


(4.0-11.0) 


 


 





 


Red Blood Count


 2.69 x10^6/uL


(3.50-5.40) 


 


 





 


Hemoglobin


 7.6 g/dL


(12.0-15.5) 


 


 





 


Hematocrit


 23.3 %


(36.0-47.0) 


 


 





 


Mean Corpuscular Volume 87 fL ()    


 


Mean Corpuscular Hemoglobin 28 pg (25-35)    


 


Mean Corpuscular Hemoglobin


Concent 32 g/dL


(31-37) 


 


 





 


Red Cell Distribution Width


 19.2 %


(11.5-14.5) 


 


 





 


Platelet Count


 437 x10^3/uL


(140-400) 


 


 





 


Neutrophils (%) (Auto) 74 % (31-73)    


 


Lymphocytes (%) (Auto) 16 % (24-48)    


 


Monocytes (%) (Auto) 9 % (0-9)    


 


Eosinophils (%) (Auto) 1 % (0-3)    


 


Basophils (%) (Auto) 0 % (0-3)    


 


Neutrophils # (Auto)


 10.6 x10^3/uL


(1.8-7.7) 


 


 





 


Lymphocytes # (Auto)


 2.3 x10^3/uL


(1.0-4.8) 


 


 





 


Monocytes # (Auto)


 1.2 x10^3/uL


(0.0-1.1) 


 


 





 


Eosinophils # (Auto)


 0.1 x10^3/uL


(0.0-0.7) 


 


 





 


Basophils # (Auto)


 0.0 x10^3/uL


(0.0-0.2) 


 


 





 


Sodium Level


 140 mmol/L


(136-145) 


 


 





 


Potassium Level


 3.5 mmol/L


(3.5-5.1) 


 


 





 


Chloride Level


 102 mmol/L


() 


 


 





 


Carbon Dioxide Level


 31 mmol/L


(21-32) 


 


 





 


Anion Gap 7 (6-14)    


 


Blood Urea Nitrogen


 36 mg/dL


(7-20) 


 


 





 


Creatinine


 1.1 mg/dL


(0.6-1.0) 


 


 





 


Estimated GFR


(Cockcroft-Gault) 52.8 


 


 


 





 


BUN/Creatinine Ratio 33 (6-20)    


 


Glucose Level


 143 mg/dL


(70-99) 


 


 





 


Calcium Level


 10.0 mg/dL


(8.5-10.1) 


 


 





 


Total Bilirubin


 0.7 mg/dL


(0.2-1.0) 


 


 





 


Aspartate Amino Transf


(AST/SGOT) 15 U/L (15-37) 


 


 


 





 


Alanine Aminotransferase


(ALT/SGPT) 14 U/L (14-59) 


 


 


 





 


Alkaline Phosphatase


 109 U/L


() 


 


 





 


Total Protein


 6.7 g/dL


(6.4-8.2) 


 


 





 


Albumin


 2.0 g/dL


(3.4-5.0) 


 


 





 


Albumin/Globulin Ratio 0.4 (1.0-1.7)    


 


Glucose (Fingerstick)


 


 130 mg/dL


(70-99) 


 











Laboratory Tests








Test


 5/29/20


12:26 5/29/20


16:58 5/30/20


00:29 5/30/20


06:00


 


Glucose (Fingerstick)


 189 mg/dL


(70-99) 186 mg/dL


(70-99) 167 mg/dL


(70-99) 





 


White Blood Count


 


 


 


 14.2 x10^3/uL


(4.0-11.0)


 


Red Blood Count


 


 


 


 2.69 x10^6/uL


(3.50-5.40)


 


Hemoglobin


 


 


 


 7.6 g/dL


(12.0-15.5)


 


Hematocrit


 


 


 


 23.3 %


(36.0-47.0)


 


Mean Corpuscular Volume    87 fL () 


 


Mean Corpuscular Hemoglobin    28 pg (25-35) 


 


Mean Corpuscular Hemoglobin


Concent 


 


 


 32 g/dL


(31-37)


 


Red Cell Distribution Width


 


 


 


 19.2 %


(11.5-14.5)


 


Platelet Count


 


 


 


 437 x10^3/uL


(140-400)


 


Neutrophils (%) (Auto)    74 % (31-73) 


 


Lymphocytes (%) (Auto)    16 % (24-48) 


 


Monocytes (%) (Auto)    9 % (0-9) 


 


Eosinophils (%) (Auto)    1 % (0-3) 


 


Basophils (%) (Auto)    0 % (0-3) 


 


Neutrophils # (Auto)


 


 


 


 10.6 x10^3/uL


(1.8-7.7)


 


Lymphocytes # (Auto)


 


 


 


 2.3 x10^3/uL


(1.0-4.8)


 


Monocytes # (Auto)


 


 


 


 1.2 x10^3/uL


(0.0-1.1)


 


Eosinophils # (Auto)


 


 


 


 0.1 x10^3/uL


(0.0-0.7)


 


Basophils # (Auto)


 


 


 


 0.0 x10^3/uL


(0.0-0.2)


 


Sodium Level


 


 


 


 140 mmol/L


(136-145)


 


Potassium Level


 


 


 


 3.5 mmol/L


(3.5-5.1)


 


Chloride Level


 


 


 


 102 mmol/L


()


 


Carbon Dioxide Level


 


 


 


 31 mmol/L


(21-32)


 


Anion Gap    7 (6-14) 


 


Blood Urea Nitrogen


 


 


 


 36 mg/dL


(7-20)


 


Creatinine


 


 


 


 1.1 mg/dL


(0.6-1.0)


 


Estimated GFR


(Cockcroft-Gault) 


 


 


 52.8 





 


BUN/Creatinine Ratio    33 (6-20) 


 


Glucose Level


 


 


 


 143 mg/dL


(70-99)


 


Calcium Level


 


 


 


 10.0 mg/dL


(8.5-10.1)


 


Total Bilirubin


 


 


 


 0.7 mg/dL


(0.2-1.0)


 


Aspartate Amino Transf


(AST/SGOT) 


 


 


 15 U/L (15-37) 





 


Alanine Aminotransferase


(ALT/SGPT) 


 


 


 14 U/L (14-59) 





 


Alkaline Phosphatase


 


 


 


 109 U/L


()


 


Total Protein


 


 


 


 6.7 g/dL


(6.4-8.2)


 


Albumin


 


 


 


 2.0 g/dL


(3.4-5.0)


 


Albumin/Globulin Ratio    0.4 (1.0-1.7) 


 


Test


 5/30/20


06:13 


 


 





 


Glucose (Fingerstick)


 130 mg/dL


(70-99) 


 


 











Medications





Active Scripts








 Medications  Dose


 Route/Sig


 Max Daily Dose Days Date Category


 


 Bisoprolol


 Fumarate 5 Mg


 Tablet  10 Mg


 PO DAILY


   3/16/20 Reported








Comments


cxr reviewed,





Impression


.


IMPRESSION:


1.  Acute hypoxemic respiratory failure secondary to ARDS status post trach,


2.  Gallstone pancreatitis


3.  Severe metabolic acidosis.stable


4.  Acute kidney injury-stable, Off HD-- continue to improve 


5.  Acute gallstone pancreatitis.


6.  Hypoalbuminemia.


7.  Moderate persistent effusions, s/p left thora  5/12


8.  Fever-  Per ID, per surgery--resolved 


9.  Chronic anemia


10. Covid 19 testing negative


11. Moderate to large ascites-S/P paracentisis


12.S/P paracentisis with 4 liters removed on 4/15/20


13. S/P IR drain placement on 5/8/2020





Plan


.


1.Continue supplemental oxygen via trach shield--  PMV/capping as tolerated


2. s/p  thoracentesis, 5/12, 3 litres removed


3. Follow surgery recs-- S/P 3 drain placed in IR on 5/8/2020


4. Follow ID recs for ABX


5. Follow nephrology recs 


6. Continue TPN 


DVT/GI PPX: heparin SQ/ protonix 


PT/OT


D/W RN and pt





CODE:FULL











MAN BLAKE MD               May 30, 2020 10:45

## 2020-05-30 NOTE — RAD
EXAM: CHEST ONE VIEW.

 

HISTORY: Line placement.

 

COMPARISON: 05/19/2020.

 

FINDINGS: A frontal view of the chest is obtained. A tracheostomy 

appliance is in expected position. A nasogastric tube has its tip in the 

stomach. Bilateral PICC lines have their tips in the superior cavoatrial 

junction.

 

The inspiration is small. There are small bilateral pleural effusions with

bibasilar atelectasis and mild pulmonary edema. There is no pneumothorax. 

The heart is not enlarged.

 

IMPRESSION: 

1. Small bilateral pleural effusions are mildly improved. 

 

Electronically signed by: ASIF Tripathi MD (5/30/2020 4:35 AM) 

[facility]

## 2020-05-30 NOTE — NUR
Pharmacy TPN Dosing Note



S: JESENIA BEAN is a 49 year old F Currently receiving Central Continuous TPN started 
03/18/20



B:Pertinent PMH: 

Necrotizing pancreatitis

Height: 5 feet, 8 inches

Weight: 75.7 kg



Current diet: NPO 



LABS:

Sodium:    140 

Potassium: 3.5 

Chloride:  102 

Calcium:   10.0 

Corrected Calcium: 11.60 

Magnesium: 2.3 

CO2:       31 

SCr:       1.1 

Glucose:   130-167 

Albumin:   2.0 

AST:       26 

ALT:       25 



TPN FORMULA:

TPN TYPE:  Central Continuous

AMINO ACIDS:         75 gm

DEXTROSE:            250 gm

LIPIDS:              40 gm

SODIUM CHLORIDE:     - mEq

SODIUM ACETATE:      - mEq

SODIUM PHOSPHATE:    - mmol

POTASSIUM CHLORIDE:  80 mEq

POTASSIUM ACETATE:   - mEq

POTASSIUM PHOSPHATE: - mmol

MAGNESIUM:           20 mEq

CALCIUM:             - mEq

INSULIN:             15 units

MULTIPLE VITAMIN:    10 ml

TRACE ELEMENTS:      1 ml(s)



TPN PLAN:  

Add 10 meq KCl to TPN for plasma K trending down. Other labs stable. Trig

Thursday, BMP/phos Monday.





R: Continue TPN AS ABOVE.

Will monitor electrolytes, glucose, and tolerance to TPN.



 CANDACE BELTRE [facility], 05/30/20 [id number]

## 2020-05-31 VITALS — SYSTOLIC BLOOD PRESSURE: 127 MMHG | DIASTOLIC BLOOD PRESSURE: 72 MMHG

## 2020-05-31 VITALS — SYSTOLIC BLOOD PRESSURE: 138 MMHG | DIASTOLIC BLOOD PRESSURE: 78 MMHG

## 2020-05-31 VITALS — DIASTOLIC BLOOD PRESSURE: 92 MMHG | SYSTOLIC BLOOD PRESSURE: 139 MMHG

## 2020-05-31 VITALS — DIASTOLIC BLOOD PRESSURE: 82 MMHG | SYSTOLIC BLOOD PRESSURE: 135 MMHG

## 2020-05-31 VITALS — DIASTOLIC BLOOD PRESSURE: 70 MMHG | SYSTOLIC BLOOD PRESSURE: 138 MMHG

## 2020-05-31 VITALS — DIASTOLIC BLOOD PRESSURE: 88 MMHG | SYSTOLIC BLOOD PRESSURE: 140 MMHG

## 2020-05-31 VITALS — DIASTOLIC BLOOD PRESSURE: 75 MMHG | SYSTOLIC BLOOD PRESSURE: 140 MMHG

## 2020-05-31 RX ADMIN — FENTANYL CITRATE PRN MCG: 50 INJECTION INTRAMUSCULAR; INTRAVENOUS at 15:55

## 2020-05-31 RX ADMIN — PIPERACILLIN SODIUM AND TAZOBACTAM SODIUM SCH MLS/HR: 3; .375 INJECTION, POWDER, LYOPHILIZED, FOR SOLUTION INTRAVENOUS at 06:02

## 2020-05-31 RX ADMIN — INSULIN LISPRO SCH UNITS: 100 INJECTION, SOLUTION INTRAVENOUS; SUBCUTANEOUS at 06:06

## 2020-05-31 RX ADMIN — INSULIN LISPRO SCH UNITS: 100 INJECTION, SOLUTION INTRAVENOUS; SUBCUTANEOUS at 00:06

## 2020-05-31 RX ADMIN — Medication PRN EACH: at 12:28

## 2020-05-31 RX ADMIN — ENOXAPARIN SODIUM SCH MG: 40 INJECTION SUBCUTANEOUS at 17:25

## 2020-05-31 RX ADMIN — INSULIN LISPRO SCH UNITS: 100 INJECTION, SOLUTION INTRAVENOUS; SUBCUTANEOUS at 17:27

## 2020-05-31 RX ADMIN — FENTANYL CITRATE PRN MCG: 50 INJECTION INTRAMUSCULAR; INTRAVENOUS at 04:02

## 2020-05-31 RX ADMIN — FUROSEMIDE SCH MG: 10 INJECTION, SOLUTION INTRAMUSCULAR; INTRAVENOUS at 08:21

## 2020-05-31 RX ADMIN — FUROSEMIDE SCH MG: 10 INJECTION, SOLUTION INTRAMUSCULAR; INTRAVENOUS at 14:40

## 2020-05-31 RX ADMIN — PIPERACILLIN SODIUM AND TAZOBACTAM SODIUM SCH MLS/HR: 3; .375 INJECTION, POWDER, LYOPHILIZED, FOR SOLUTION INTRAVENOUS at 12:21

## 2020-05-31 RX ADMIN — PIPERACILLIN SODIUM AND TAZOBACTAM SODIUM SCH MLS/HR: 3; .375 INJECTION, POWDER, LYOPHILIZED, FOR SOLUTION INTRAVENOUS at 17:26

## 2020-05-31 RX ADMIN — PIPERACILLIN SODIUM AND TAZOBACTAM SODIUM SCH MLS/HR: 3; .375 INJECTION, POWDER, LYOPHILIZED, FOR SOLUTION INTRAVENOUS at 23:57

## 2020-05-31 RX ADMIN — FENTANYL CITRATE PRN MCG: 50 INJECTION INTRAMUSCULAR; INTRAVENOUS at 10:54

## 2020-05-31 RX ADMIN — INSULIN LISPRO SCH UNITS: 100 INJECTION, SOLUTION INTRAVENOUS; SUBCUTANEOUS at 12:24

## 2020-05-31 RX ADMIN — PANTOPRAZOLE SODIUM SCH MG: 40 INJECTION, POWDER, FOR SOLUTION INTRAVENOUS at 08:22

## 2020-05-31 NOTE — NUR
Pharmacy TPN Dosing Note



S: JESENIA BEAN is a 49 year old F Currently receiving Central Continuous TPN started 
03/18/20



B:Pertinent PMH: Necrotizing pancreatitis

Height: 5 feet, 8 inches

Weight: 76.0 kg



Current diet: NPO 



LABS:

Sodium:    140 

Potassium: 3.5 

Chloride:  102 

Calcium:   10.0 

Corrected Calcium: 11.60 

Magnesium: 2.3 

CO2:       31 

SCr:       1.1 

Glucose:   130-167 

Albumin:   2.0 

AST:       26 

ALT:       25 



TPN FORMULA:

TPN TYPE:  Central Continuous

AMINO ACIDS:         75 gm

DEXTROSE:            250 gm

LIPIDS:              40 gm



POTASSIUM CHLORIDE:  80 mEq

MAGNESIUM:           20 mEq

INSULIN:             15 units

MULTIPLE VITAMIN:    10 ml

TRACE ELEMENTS:      1 ml(s)



TPN PLAN:  

No labs today. Continue same.





R: Continue TPN 

Will monitor electrolytes, glucose, and tolerance to TPN.



 Maribell Vazquez RPH, 05/31/20 [id number]

## 2020-05-31 NOTE — PDOC
SURGICAL PROGRESS NOTE


Subjective


Pt sleeping comfortably, drains with min output, but drainage around


Vital Signs





Vital Signs








  Date Time  Temp Pulse Resp B/P (MAP) Pulse Ox O2 Delivery O2 Flow Rate FiO2


 


5/31/20 04:36   22   Room Air  


 


5/31/20 04:00 98.7 148  138/70 (92) 95   





 98.7       


 


5/30/20 09:32       8.0 








I&O











Intake and Output 


 


 5/31/20





 07:00


 


Intake Total 1586.5 ml


 


Output Total 1800 ml


 


Balance -213.5 ml


 


 


 


IV Total 1115.5 ml


 


Other 471 ml


 


Output Urine Total 1800 ml


 


# Bowel Movements 1








Abdomen:  Soft


Labs





Laboratory Tests








Test


 5/29/20


12:26 5/29/20


16:58 5/30/20


00:29 5/30/20


06:00


 


Glucose (Fingerstick)


 189 mg/dL


(70-99) 186 mg/dL


(70-99) 167 mg/dL


(70-99) 





 


White Blood Count


 


 


 


 14.2 x10^3/uL


(4.0-11.0)


 


Red Blood Count


 


 


 


 2.69 x10^6/uL


(3.50-5.40)


 


Hemoglobin


 


 


 


 7.6 g/dL


(12.0-15.5)


 


Hematocrit


 


 


 


 23.3 %


(36.0-47.0)


 


Mean Corpuscular Volume    87 fL () 


 


Mean Corpuscular Hemoglobin    28 pg (25-35) 


 


Mean Corpuscular Hemoglobin


Concent 


 


 


 32 g/dL


(31-37)


 


Red Cell Distribution Width


 


 


 


 19.2 %


(11.5-14.5)


 


Platelet Count


 


 


 


 437 x10^3/uL


(140-400)


 


Neutrophils (%) (Auto)    74 % (31-73) 


 


Lymphocytes (%) (Auto)    16 % (24-48) 


 


Monocytes (%) (Auto)    9 % (0-9) 


 


Eosinophils (%) (Auto)    1 % (0-3) 


 


Basophils (%) (Auto)    0 % (0-3) 


 


Neutrophils # (Auto)


 


 


 


 10.6 x10^3/uL


(1.8-7.7)


 


Lymphocytes # (Auto)


 


 


 


 2.3 x10^3/uL


(1.0-4.8)


 


Monocytes # (Auto)


 


 


 


 1.2 x10^3/uL


(0.0-1.1)


 


Eosinophils # (Auto)


 


 


 


 0.1 x10^3/uL


(0.0-0.7)


 


Basophils # (Auto)


 


 


 


 0.0 x10^3/uL


(0.0-0.2)


 


Sodium Level


 


 


 


 140 mmol/L


(136-145)


 


Potassium Level


 


 


 


 3.5 mmol/L


(3.5-5.1)


 


Chloride Level


 


 


 


 102 mmol/L


()


 


Carbon Dioxide Level


 


 


 


 31 mmol/L


(21-32)


 


Anion Gap    7 (6-14) 


 


Blood Urea Nitrogen


 


 


 


 36 mg/dL


(7-20)


 


Creatinine


 


 


 


 1.1 mg/dL


(0.6-1.0)


 


Estimated GFR


(Cockcroft-Gault) 


 


 


 52.8 





 


BUN/Creatinine Ratio    33 (6-20) 


 


Glucose Level


 


 


 


 143 mg/dL


(70-99)


 


Calcium Level


 


 


 


 10.0 mg/dL


(8.5-10.1)


 


Total Bilirubin


 


 


 


 0.7 mg/dL


(0.2-1.0)


 


Aspartate Amino Transf


(AST/SGOT) 


 


 


 15 U/L (15-37) 





 


Alanine Aminotransferase


(ALT/SGPT) 


 


 


 14 U/L (14-59) 





 


Alkaline Phosphatase


 


 


 


 109 U/L


()


 


Total Protein


 


 


 


 6.7 g/dL


(6.4-8.2)


 


Albumin


 


 


 


 2.0 g/dL


(3.4-5.0)


 


Albumin/Globulin Ratio    0.4 (1.0-1.7) 


 


Test


 5/30/20


06:13 5/30/20


12:06 5/30/20


16:27 5/30/20


17:57


 


Glucose (Fingerstick)


 130 mg/dL


(70-99) 206 mg/dL


(70-99) 88 mg/dL


(70-99) 112 mg/dL


(70-99)


 


Test


 5/31/20


00:04 5/31/20


06:04 


 





 


Glucose (Fingerstick)


 189 mg/dL


(70-99) 219 mg/dL


(70-99) 


 











Laboratory Tests








Test


 5/30/20


12:06 5/30/20


16:27 5/30/20


17:57 5/31/20


00:04


 


Glucose (Fingerstick)


 206 mg/dL


(70-99) 88 mg/dL


(70-99) 112 mg/dL


(70-99) 189 mg/dL


(70-99)


 


Test


 5/31/20


06:04 


 


 





 


Glucose (Fingerstick)


 219 mg/dL


(70-99) 


 


 











Problem List


Problems


Medical Problems:


(1) Acute pancreatitis


Status: Acute  





(2) Cholelithiasis


Status: Acute  








Assessment/Plan


s/p lap exp


will remove drains as non fxn


may need replacement











GAMAL ZHOU MD             May 31, 2020 09:25

## 2020-05-31 NOTE — PDOC
[facility] PROGRESS NOTE


Chief Complaint


Chief Complaint


Acute hypoxic Respiratory failure requiring mechanical ventilation (now 

extubated for several days but still with tracheostomy)


Tracheostomy


bilateral pleural effusions/pulm edema 


Sepsis


Severe Acute gallstone pancreatitis (not a surgical candidate at this time) with

necrosis


Acute kidney failure now requiring dialysis


Salpingitis


Gallstones (Calculus of gallbladder with acute cholecystitis without obstruc

tion)


HTN 


Leukocytosis 


Hypoxia


Uterine fibroid


Intractable pain


Intractable nausea


Covid 19 negative. 


Acute on chronic anemia 


EEG: No seizure activityFever  - better currently - intermittent could be from 

underlying pancreatitis blood cults 5/4 - neg so far


? Ileus with vomiting


Abd distention - U/S and CT reviewed s/p 0.4 L of opaque, debris-containing 

ascites was removed 5/6


Acute pancreatitis with persistent necrosis


- 4/27 status post ROBERT drain placement + C paropsilosis. s/p additional drains 

5/8


Anemia - S/p PRBCs


Cholelithiasis with thickening of the gallbladder wall.


Leucocytosis improving


JED, hyperkalemia, Metabolic acidosis off dialysis


Acute hypoxic resp failure ,bilateral pleural effusion and atelectasis


hypocalcemia 


Prediabetes


HTN


s/p trach


ESRD on HD


Hyperglycemia





History of Present Illness


History of Present Illness


5/31/2020


Patient still in ICU


Resting with no apparent distress


Chart reviewed








5/30/2020


Patient seen and examined in the ICU


She is wiping her face with a cough


Discussed with RN


Chart reviewed


We hope to get her out of the ICU later today if possible








5/29/2020


Patient seen and examined in the ICU once again


She is back on NG suction


On IV Zosyn


Has IV TPN


Sedated with Precedex but anxious still


Appears somewhat clammy and pale


Chart reviewed


Discussed with RN


She remains critically ill














5/28/2020


Patient seen and examined in the ICU


She has an NG that is clamped we are hoping to start some clear liquids but she 

looks quite ill


She is on IV TPN


Meropenem changed to IV Zosyn (agree)


She has [place] to bedside drainage


She is semi-sedated with Precedex


Chart reviewed


Discussed with RN


She remains critically ill











Seen bedside. Hb 8. She was just a bit hypoxic, had a mucous plug suctioned. 

Able to vocalize well with speaking valve, tells me we are being very hard on 

her and she would like to be drugged back to sleep.  She wants to wake up and 

feel better. On trach shield, 


T max 100.4. afebrile this a.m.





5/26/2020


Patient seen and examined in the ICU


She is up in the chair very frail trying to talk a little shaky


Discussed with RN


Chart reviewed








5/25/2020


Patient seen and examined in the ICU


Patient up in the chair


Having a severe coughing episode with a lot of phlegm coming out of her 

tracheostomy


Discussed with RN


Discussed with physical therapy


Chart reviewed











5/24,.    feels well,  has been out of room in wheelchair


no complaint,    still weak,   some with not wanting to wear her valve on trach


cont other,   may be able to work with speech tomorrow,    ketty OK to 

try, 








5/23,  anxiety is up today,   she dislikes the valve still,    


shower and outside today


.   





5/22 she doesnt want to wear her passy-kristan valve,  discussed str and plan with 

her.


speech following,  needs swallow study,   but needs to wear her valve longer,  


cont current


able to walk some, walker 





5/21  stronger,   better,   


we discussed better oral care


she would like to try swallow study,  wants to try to eat,    speech is foll

owing








5/20/2020 


She remains in the ICU


sitting up and working with OT,   getting better


if limit pain meds, may do better off the vent, 





Nurses trying to suction her,  that is also improved, 


Chart reviewed


 








5/19/2020


Patient seen and examined in the ICU


She had an episode yesterday of tachycardia and severe agitation we gave her 

some Ativan


After that she seemed to have stroke symptoms but now that the Ativan has wore 

off her stroke symptoms have resolved


 


 


She is on IV meropenem and daptomycin and micafungin


Chart reviewed


Discussed with RN


Patient is still critically ill


 


BRIEF OPERATIVE NOTE


Pre-Op Diagnosis


Pancreatitis with pseudocysts, suspected infection


Post-Op Diagnosis


same


Procedure Performed


CT abdominal Drains x 3


Surgeon


Hardy


Anesthesia Type:  Conscious Sedation


Findings


3 abdominal drains, 14F, with turbid pancreatic fluid and necrotic debris in 

each.


Complications


No immediate








5/9: Patient today somewhat restless and having bilious secretions from ET tube,

imaging studies ordered, discussed with consultant. Pretty poor prognosis, 

hopefully is not a fistula, poor surgical candidate. 





5/10: Imaging with no acute events, she seems more stable today compared to 

yesterday. Encouraged as much activity as possible patient at high risk for 

severe depression.





Vitals/I&O


Vitals/I&O:





                                   Vital Signs








  Date Time  Temp Pulse Resp B/P (MAP) Pulse Ox O2 Delivery O2 Flow Rate FiO2


 


5/31/20 10:54     95 Room Air  


 


5/31/20 08:00 101.3 144 24 140/75 (96)    





 101.3       


 


5/30/20 09:32       8.0 














                                    I & O   


 


 5/30/20 5/30/20 5/31/20





 15:00 23:00 07:00


 


Intake Total  621 ml 965.5 ml


 


Output Total 725 ml 375 ml 700 ml


 


Balance -725 ml 246 ml 265.5 ml











Physical Exam


Physical Exam:


GENERAL: Propped up in bed, resting quietly, weak appearing 


HEENT:  Oral cavity clear,  NGT


NECK:  Trach present


LUNGS: Clear, nonlabored 


HEART:  S1, S2, regular 


ABDOMEN: mod distention, bowel sounds present, + ROBERT drains x 3


: [place] (4/14)


EXTREMITIES: Generalized edema. no cyanosis


SKIN: Warm and dry.  No generalized rash.  


CNS:Opens eyes to voice, very weak 


LUE-PICC (5/29) without signsof complications


General:  Alert, Cooperative, No acute distress


Heart:  Regular rate, Normal S1, Normal S2, No murmurs, Gallops


Lungs:  Other (Good air movement but having a lot of productive sputum from her 

tracheostomy site)


Abdomen:  Soft


Extremities:  No clubbing, No cyanosis, No edema, Normal pulses, No te

nderness/swelling


Skin:  Other (warm, dry)





Labs


Labs:





Laboratory Tests








Test


 5/30/20


16:27 5/30/20


17:57 5/31/20


00:04 5/31/20


06:04


 


Glucose (Fingerstick)


 88 mg/dL


(70-99) 112 mg/dL


(70-99) 189 mg/dL


(70-99) 219 mg/dL


(70-99)











Assessment and Plan


Assessmemt and Plan


Problems


Medical Problems:


(1) Acute pancreatitis


Status: Acute  





(2) Cholelithiasis


Status: Acute  





Acute hypoxic Respiratory failure requiring mechanical ventilation was initially

intubated on 3/23 was off for couple of days now back on


Severe gallstone pancreatitis (not a surgical candidate at this time) with 

necrosis


Tracheostomy


bilateral pleural effusions/pulm edema 


Severe sepsis


Acute kidney failure now requiring dialysis


Salpingitis


Gallstones (Calculus of gallbladder with acute cholecystitis without 

obstruction)


HTN 


Leukocytosis 


Hypoxia


Uterine fibroid


Intractable pain


Intractable nausea


Covid 19 negative. 


Acute on chronic anemia 


EEG: No seizure activityFever  - better currently - intermittent could be from 

underlying pancreatitis blood cults 5/4 - neg so far


? Ileus with vomiting


Abd distention - U/S and CT reviewed s/p 0.4 L of opaque, debris-containing 

ascites was removed 5/6


Acute pancreatitis with persistent necrosis


- 4/27 status post ROBERT drain placement + C paropsilosis. s/p additional drains 

5/8


Anemia - S/p PRBCs


Cholelithiasis with thickening of the gallbladder wall.


Leucocytosis improving


JED, hyperkalemia, Metabolic acidosis off dialysis


Acute hypoxic resp failure ,bilateral pleural effusion and atelectasis


hypocalcemia 


Prediabetes


HTN


s/p trach


ESRD on HD


Hyperglycemia








Plan


ICU monitoring (we are going to try to transfer her out this afternoon)


Wound care


Hold off on Ativan for now as she gets too weak with it


Humidified O2 via nasal cannula for now but we can use trach shield as backup


NG suctioning


Nebulizers


Continue IV antibiotics and micafungin


Sedation with Precedex PRN


TPN protocol


Continue Chino to bedside drainage


Tracheostomy care


Hope to eventually move towards decannulation (we have a speaking valve for now)


Trend labs


Appreciate subspecialist input


I am still very concerned about her long-term prognosis ! (she scored a 9 on 

[place] criteria 4 weeks ago).





Comment


Review of Relevant


I have reviewed the following items josy (where applicable) has been applied.


Medications:





Current Medications








 Medications


  (Trade)  Dose


 Ordered  Sig/Yee


 Route


 PRN Reason  Start Time


 Stop Time Status Last Admin


Dose Admin


 


 Potassium


 Chloride 80 meq/


 Magnesium Sulfate


 20 meq/


 Multivitamins 10


 ml/Chromium/


 Copper/Manganese/


 Seleni/Zn 1 ml/


 Insulin Human


 Regular 15 unit/


 Total Parenteral


 Nutrition/Amino


 Acids/Dextrose/


 Fat Emulsion


 Intravenous  1,800 ml @ 


 75 mls/hr  TPN  CONT


 IV


   5/30/20 22:00


 5/31/20 21:59  5/30/20 22:30














Hemodynamically unstable?:  No


Is patient in severe pain?:  No


Is NPO status required?:  No











CASTLE,NIAL K III DO           May 31, 2020 12:16

## 2020-05-31 NOTE — NUR
ROBERT drains removed per Dr. Olivera's order. One ROBERT on left side removed without difficulty. On 
right side both ROBERT drains removed. Lower anterior drain immediately began oozing copious 
amounts of tan, creamy drainage. Colostomy bag applied to site. 4x4's applied to other ROBERT 
drain removal sites. Dr. Olivera notified.

## 2020-05-31 NOTE — PDOC
Infectious Disease Note


Subjective


Subjective


RUE-PICC has een removed and cath tip sent for culture


Fever 101 this morning per nursing 


Desatuated after tracheal suctioning, now improved on FiO2 33% 


Minimal output x 3 drains





ROS


ROS


as mentioned above





Vital Sign


Vital Signs





Vital Signs








  Date Time  Temp Pulse Resp B/P (MAP) Pulse Ox O2 Delivery O2 Flow Rate FiO2


 


5/31/20 04:36   22   Room Air  


 


5/31/20 04:00 98.7 148  138/70 (92) 95   





 98.7       


 


5/30/20 09:32       8.0 











Physical Exam


PHYSICAL EXAM


GENERAL: Propped up in bed, resting quietly, weak appearing 


HEENT:  Oral cavity clear,  NGT


NECK:  Trach present


LUNGS: Clear, nonlabored 


HEART:  S1, S2, regular 


ABDOMEN: mod distention, bowel sounds present, + ROBERT drains x 3


: [place] (4/14)


EXTREMITIES: Generalized edema. no cyanosis


SKIN: Warm and dry.  No generalized rash.  


CNS:Opens eyes to voice, very weak 


LUE-PICC (5/29) without signsof complications





Labs


Lab





Laboratory Tests








Test


 5/30/20


12:06 5/30/20


16:27 5/30/20


17:57 5/31/20


00:04


 


Glucose (Fingerstick)


 206 mg/dL


(70-99) 88 mg/dL


(70-99) 112 mg/dL


(70-99) 189 mg/dL


(70-99)


 


Test


 5/31/20


06:04 


 


 





 


Glucose (Fingerstick)


 219 mg/dL


(70-99) 


 


 











Micro


5/4. BLOOD CULTURE  Preliminary  


        NO GROWTH AFTER 5 DAYS 








4/27. abd fluid


  AEROBIC RES 1  Final  


        Organism Identification, Yeast


          Candida parapsilosis








5/6. abd fluid


     ANAEROBIC-AEROBIC CULTURE  


                    PENDING





Objective


Assessment


Fever intermittent could be from underlying pancreatitis vs aspiration 


? Ileus with vomiting


Abd distention - U/S and CT reviewed s/p 0.4 L of opaque, debris-containing asci

kareem was removed 5/6


Acute pancreatitis with persistent necrosis


  - 4/27 status post ROBERT drain placement + C paropsilosis; 5/6 + yeast & high 

amylase; s/p additional drains on 5/8


Anemia - S/p PRBCs


Cholelithiasis with thickening of the gallbladder wall.


Leucocytosis improved 


JED, hyperkalemia, Metabolic acidosis off dialysis


Acute hypoxic resp failure ,bilateral pleural effusion and atelectasis


hypocalcemia 


Prediabetes


HTN


s/p trach





Plan


Plan of Care


continue Zosyn May 27


f/u labs/cath tip culture 


Aggressive pulmonary toilet


Maintain aspiration precaution


Supportive care


PT and OT as tolerated





D/w nursing


Attending Co-Sign


The patient was seen and interviewed as well as examined at the bedside. The 

chart was reviewed. The case was discussed. Agree with the plan of care.











HAVEN WINTERS        May 31, 2020 08:49


LINN FRAZN MD               May 31, 2020 12:08

## 2020-05-31 NOTE — PDOC
PULMONARY PROGRESS NOTES


Subjective


Patient intubated on 3/23 , s/p trach 4/6, has cough


Remains on TS, tolerated pmv for short period of time


Vitals





Vital Signs








  Date Time  Temp Pulse Resp B/P (MAP) Pulse Ox O2 Delivery O2 Flow Rate FiO2


 


5/31/20 16:49   32  95 Room Air 8.0 


 


5/31/20 15:00 98.0 141  127/72 (90)    





 98.0       








ROS:  No Chest Pain, No Abdominal Pain, No Increase Cough


General:  Alert, No acute distress


HEENT:  Other (trach midline )


Lungs:  Other (Good air movement but having a lot of productive sputum from her 

tracheostomy site)


Cardiovascular:  S1, S2


Abdomen:  Soft, Non-tender


Neuro Exam:  Alert


Extremities:  Other (+3 generalized edema )


Skin:  Warm, Dry


Labs





Laboratory Tests








Test


 5/30/20


00:29 5/30/20


06:00 5/30/20


06:13 5/30/20


12:06


 


Glucose (Fingerstick)


 167 mg/dL


(70-99) 


 130 mg/dL


(70-99) 206 mg/dL


(70-99)


 


White Blood Count


 


 14.2 x10^3/uL


(4.0-11.0) 


 





 


Red Blood Count


 


 2.69 x10^6/uL


(3.50-5.40) 


 





 


Hemoglobin


 


 7.6 g/dL


(12.0-15.5) 


 





 


Hematocrit


 


 23.3 %


(36.0-47.0) 


 





 


Mean Corpuscular Volume  87 fL ()   


 


Mean Corpuscular Hemoglobin  28 pg (25-35)   


 


Mean Corpuscular Hemoglobin


Concent 


 32 g/dL


(31-37) 


 





 


Red Cell Distribution Width


 


 19.2 %


(11.5-14.5) 


 





 


Platelet Count


 


 437 x10^3/uL


(140-400) 


 





 


Neutrophils (%) (Auto)  74 % (31-73)   


 


Lymphocytes (%) (Auto)  16 % (24-48)   


 


Monocytes (%) (Auto)  9 % (0-9)   


 


Eosinophils (%) (Auto)  1 % (0-3)   


 


Basophils (%) (Auto)  0 % (0-3)   


 


Neutrophils # (Auto)


 


 10.6 x10^3/uL


(1.8-7.7) 


 





 


Lymphocytes # (Auto)


 


 2.3 x10^3/uL


(1.0-4.8) 


 





 


Monocytes # (Auto)


 


 1.2 x10^3/uL


(0.0-1.1) 


 





 


Eosinophils # (Auto)


 


 0.1 x10^3/uL


(0.0-0.7) 


 





 


Basophils # (Auto)


 


 0.0 x10^3/uL


(0.0-0.2) 


 





 


Sodium Level


 


 140 mmol/L


(136-145) 


 





 


Potassium Level


 


 3.5 mmol/L


(3.5-5.1) 


 





 


Chloride Level


 


 102 mmol/L


() 


 





 


Carbon Dioxide Level


 


 31 mmol/L


(21-32) 


 





 


Anion Gap  7 (6-14)   


 


Blood Urea Nitrogen


 


 36 mg/dL


(7-20) 


 





 


Creatinine


 


 1.1 mg/dL


(0.6-1.0) 


 





 


Estimated GFR


(Cockcroft-Gault) 


 52.8 


 


 





 


BUN/Creatinine Ratio  33 (6-20)   


 


Glucose Level


 


 143 mg/dL


(70-99) 


 





 


Calcium Level


 


 10.0 mg/dL


(8.5-10.1) 


 





 


Total Bilirubin


 


 0.7 mg/dL


(0.2-1.0) 


 





 


Aspartate Amino Transf


(AST/SGOT) 


 15 U/L (15-37) 


 


 





 


Alanine Aminotransferase


(ALT/SGPT) 


 14 U/L (14-59) 


 


 





 


Alkaline Phosphatase


 


 109 U/L


() 


 





 


Total Protein


 


 6.7 g/dL


(6.4-8.2) 


 





 


Albumin


 


 2.0 g/dL


(3.4-5.0) 


 





 


Albumin/Globulin Ratio  0.4 (1.0-1.7)   


 


Test


 5/30/20


16:27 5/30/20


17:57 5/31/20


00:04 5/31/20


06:04


 


Glucose (Fingerstick)


 88 mg/dL


(70-99) 112 mg/dL


(70-99) 189 mg/dL


(70-99) 219 mg/dL


(70-99)


 


Test


 5/31/20


12:23 5/31/20


17:24 


 





 


Glucose (Fingerstick)


 225 mg/dL


(70-99) 244 mg/dL


(70-99) 


 











Laboratory Tests








Test


 5/31/20


00:04 5/31/20


06:04 5/31/20


12:23 5/31/20


17:24


 


Glucose (Fingerstick)


 189 mg/dL


(70-99) 219 mg/dL


(70-99) 225 mg/dL


(70-99) 244 mg/dL


(70-99)








Medications





Active Scripts








 Medications  Dose


 Route/Sig


 Max Daily Dose Days Date Category


 


 Bisoprolol


 Fumarate 5 Mg


 Tablet  10 Mg


 PO DAILY


   3/16/20 Reported








Comments


cxr reviewed,





Impression


.


IMPRESSION:


1.  Acute hypoxemic respiratory failure secondary to ARDS status post trach,


2.  Gallstone pancreatitis


3.  Severe metabolic acidosis.stable


4.  Acute kidney injury-stable, Off HD-- continue to improve 


5.  Acute gallstone pancreatitis.


6.  Hypoalbuminemia.


7.  Moderate persistent effusions, s/p left thora  5/12


8.  Fever-  Per ID, per surgery--resolved 


9.  Chronic anemia


10. Covid 19 testing negative


11. Moderate to large ascites-S/P paracentisis


12.S/P paracentisis with 4 liters removed on 4/15/20


13. S/P IR drain placement on 5/8/2020





Plan


.


1.Continue supplemental oxygen via trach shield--  PMV/capping as tolerated


2. s/p  thoracentesis, 5/12, 3 litres removed


3. Follow surgery recs-- S/P 3 drain placed in IR on 5/8/2020


4. Follow ID recs for ABX


5. Follow nephrology recs 


6. Continue TPN 


DVT/GI PPX: heparin SQ/ protonix 


PT/OT


D/W RN and pt





CODE:MAN VÁSQUEZ MD               May 31, 2020 18:08

## 2020-06-01 VITALS — DIASTOLIC BLOOD PRESSURE: 99 MMHG | SYSTOLIC BLOOD PRESSURE: 184 MMHG

## 2020-06-01 VITALS — SYSTOLIC BLOOD PRESSURE: 128 MMHG | DIASTOLIC BLOOD PRESSURE: 82 MMHG

## 2020-06-01 VITALS — DIASTOLIC BLOOD PRESSURE: 78 MMHG | SYSTOLIC BLOOD PRESSURE: 117 MMHG

## 2020-06-01 VITALS — DIASTOLIC BLOOD PRESSURE: 97 MMHG | SYSTOLIC BLOOD PRESSURE: 155 MMHG

## 2020-06-01 VITALS — DIASTOLIC BLOOD PRESSURE: 79 MMHG | SYSTOLIC BLOOD PRESSURE: 160 MMHG

## 2020-06-01 VITALS — DIASTOLIC BLOOD PRESSURE: 95 MMHG | SYSTOLIC BLOOD PRESSURE: 143 MMHG

## 2020-06-01 VITALS — DIASTOLIC BLOOD PRESSURE: 86 MMHG | SYSTOLIC BLOOD PRESSURE: 139 MMHG

## 2020-06-01 LAB
ANION GAP SERPL CALC-SCNC: 10 MMOL/L (ref 6–14)
BASOPHILS # BLD AUTO: 0 X10^3/UL (ref 0–0.2)
BASOPHILS NFR BLD: 0 % (ref 0–3)
BUN SERPL-MCNC: 34 MG/DL (ref 7–20)
CALCIUM SERPL-MCNC: 9.9 MG/DL (ref 8.5–10.1)
CHLORIDE SERPL-SCNC: 106 MMOL/L (ref 98–107)
CO2 SERPL-SCNC: 29 MMOL/L (ref 21–32)
CREAT SERPL-MCNC: 1.1 MG/DL (ref 0.6–1)
EOSINOPHIL NFR BLD: 0.1 X10^3/UL (ref 0–0.7)
EOSINOPHIL NFR BLD: 1 % (ref 0–3)
ERYTHROCYTE [DISTWIDTH] IN BLOOD BY AUTOMATED COUNT: 19.3 % (ref 11.5–14.5)
GFR SERPLBLD BASED ON 1.73 SQ M-ARVRAT: 52.8 ML/MIN
GLUCOSE SERPL-MCNC: 188 MG/DL (ref 70–99)
HCT VFR BLD CALC: 26.7 % (ref 36–47)
HGB BLD-MCNC: 8.4 G/DL (ref 12–15.5)
LYMPHOCYTES # BLD: 2.6 X10^3/UL (ref 1–4.8)
LYMPHOCYTES NFR BLD AUTO: 20 % (ref 24–48)
MCH RBC QN AUTO: 27 PG (ref 25–35)
MCHC RBC AUTO-ENTMCNC: 31 G/DL (ref 31–37)
MCV RBC AUTO: 87 FL (ref 79–100)
MONO #: 1.3 X10^3/UL (ref 0–1.1)
MONOCYTES NFR BLD: 10 % (ref 0–9)
NEUT #: 8.6 X10^3/UL (ref 1.8–7.7)
NEUTROPHILS NFR BLD AUTO: 68 % (ref 31–73)
PHOSPHATE SERPL-MCNC: 3.6 MG/DL (ref 2.6–4.7)
PLATELET # BLD AUTO: 554 X10^3/UL (ref 140–400)
POTASSIUM SERPL-SCNC: 3.2 MMOL/L (ref 3.5–5.1)
RBC # BLD AUTO: 3.07 X10^6/UL (ref 3.5–5.4)
SODIUM SERPL-SCNC: 145 MMOL/L (ref 136–145)
WBC # BLD AUTO: 12.6 X10^3/UL (ref 4–11)

## 2020-06-01 RX ADMIN — PIPERACILLIN SODIUM AND TAZOBACTAM SODIUM SCH MLS/HR: 3; .375 INJECTION, POWDER, LYOPHILIZED, FOR SOLUTION INTRAVENOUS at 17:50

## 2020-06-01 RX ADMIN — FENTANYL CITRATE PRN MCG: 50 INJECTION INTRAMUSCULAR; INTRAVENOUS at 12:07

## 2020-06-01 RX ADMIN — INSULIN LISPRO SCH UNITS: 100 INJECTION, SOLUTION INTRAVENOUS; SUBCUTANEOUS at 00:07

## 2020-06-01 RX ADMIN — FUROSEMIDE SCH MG: 10 INJECTION, SOLUTION INTRAMUSCULAR; INTRAVENOUS at 07:56

## 2020-06-01 RX ADMIN — ACETAMINOPHEN PRN MG: 650 SUPPOSITORY RECTAL at 15:30

## 2020-06-01 RX ADMIN — PIPERACILLIN SODIUM AND TAZOBACTAM SODIUM SCH MLS/HR: 3; .375 INJECTION, POWDER, LYOPHILIZED, FOR SOLUTION INTRAVENOUS at 05:34

## 2020-06-01 RX ADMIN — FENTANYL CITRATE PRN MCG: 50 INJECTION INTRAMUSCULAR; INTRAVENOUS at 01:14

## 2020-06-01 RX ADMIN — PIPERACILLIN SODIUM AND TAZOBACTAM SODIUM SCH MLS/HR: 3; .375 INJECTION, POWDER, LYOPHILIZED, FOR SOLUTION INTRAVENOUS at 13:26

## 2020-06-01 RX ADMIN — INSULIN LISPRO SCH UNITS: 100 INJECTION, SOLUTION INTRAVENOUS; SUBCUTANEOUS at 12:17

## 2020-06-01 RX ADMIN — PANTOPRAZOLE SODIUM SCH MG: 40 INJECTION, POWDER, FOR SOLUTION INTRAVENOUS at 07:55

## 2020-06-01 RX ADMIN — FUROSEMIDE SCH MG: 10 INJECTION, SOLUTION INTRAMUSCULAR; INTRAVENOUS at 13:32

## 2020-06-01 RX ADMIN — INSULIN LISPRO SCH UNITS: 100 INJECTION, SOLUTION INTRAVENOUS; SUBCUTANEOUS at 17:57

## 2020-06-01 RX ADMIN — INSULIN LISPRO SCH UNITS: 100 INJECTION, SOLUTION INTRAVENOUS; SUBCUTANEOUS at 05:50

## 2020-06-01 RX ADMIN — ENOXAPARIN SODIUM SCH MG: 40 INJECTION SUBCUTANEOUS at 17:32

## 2020-06-01 RX ADMIN — Medication PRN EACH: at 13:03

## 2020-06-01 NOTE — PDOC
PROGRESS NOTES


Assessment


Problems


Medical Problems:


(1) Acute pancreatitis


Status: Acute  





(2) Cholelithiasis


Status: Acute  ]]Respiratory failure.





Single seizure on 3/6, no recurrence.


Metabolic encephalopathy.


Fevers.


Metabolic acidosis.


Diffuse pulmonary infiltrate.


Pleural effusion.


Pancreatitis, necrotizing.


Gallstone.


Leukocytosis.


Lymphopenia.


Electrolytes imbalances.


Hyperglycemia.


DM.


HTN.


HLD.


Anemia.


Abnormal CXR.


Obesity.


Ascites and pleural effusion


Ileus with vomiting


Anemia 


JED


Hyperkalemia


Metabolic acidosis


Hypertension


S/P trach


Plan


Keppra if she has further seizures.


Treat medical diseases.


Subjective


No complaints


Objective





Vital Signs








  Date Time  Temp Pulse Resp B/P (MAP) Pulse Ox O2 Delivery O2 Flow Rate FiO2


 


6/1/20 08:10   48 160/79 (106) 100 Tracheal Collar  


 


6/1/20 08:00       10.0 


 


6/1/20 07:00 99.4 138      





 99.4       














Intake and Output 


 


 6/1/20





 07:00


 


Intake Total 923 ml


 


Output Total 1450 ml


 


Balance -527 ml


 


 


 


Intake Oral 0 ml


 


Other 923 ml


 


Output Urine Total 1450 ml


 


# Bowel Movements 2








PHYSICAL EXAM


Alert, follows commands. Tracheostomy shield


PERRL.


EOMI.


CN: no focal findings.


Muscle tone: normal.


Muscle strength: 3-4/5


DTR: 1+


Plantar reflex: Silent


Gait: not examined in bed.


Sensory exam: normal


Cerebellar: normal


Review of Relevant


I have reviewed the following items josy (where applicable) has been applied.


Labs





Laboratory Tests








Test


 5/30/20


12:06 5/30/20


16:27 5/30/20


17:57 5/31/20


00:04


 


Glucose (Fingerstick)


 206 mg/dL


(70-99) 88 mg/dL


(70-99) 112 mg/dL


(70-99) 189 mg/dL


(70-99)


 


Test


 5/31/20


06:04 5/31/20


12:23 5/31/20


17:24 6/1/20


00:04


 


Glucose (Fingerstick)


 219 mg/dL


(70-99) 225 mg/dL


(70-99) 244 mg/dL


(70-99) 175 mg/dL


(70-99)


 


Test


 6/1/20


05:30 6/1/20


05:48 


 





 


White Blood Count


 12.6 x10^3/uL


(4.0-11.0) 


 


 





 


Red Blood Count


 3.07 x10^6/uL


(3.50-5.40) 


 


 





 


Hemoglobin


 8.4 g/dL


(12.0-15.5) 


 


 





 


Hematocrit


 26.7 %


(36.0-47.0) 


 


 





 


Mean Corpuscular Volume 87 fL ()    


 


Mean Corpuscular Hemoglobin 27 pg (25-35)    


 


Mean Corpuscular Hemoglobin


Concent 31 g/dL


(31-37) 


 


 





 


Red Cell Distribution Width


 19.3 %


(11.5-14.5) 


 


 





 


Platelet Count


 554 x10^3/uL


(140-400) 


 


 





 


Neutrophils (%) (Auto) 68 % (31-73)    


 


Lymphocytes (%) (Auto) 20 % (24-48)    


 


Monocytes (%) (Auto) 10 % (0-9)    


 


Eosinophils (%) (Auto) 1 % (0-3)    


 


Basophils (%) (Auto) 0 % (0-3)    


 


Neutrophils # (Auto)


 8.6 x10^3/uL


(1.8-7.7) 


 


 





 


Lymphocytes # (Auto)


 2.6 x10^3/uL


(1.0-4.8) 


 


 





 


Monocytes # (Auto)


 1.3 x10^3/uL


(0.0-1.1) 


 


 





 


Eosinophils # (Auto)


 0.1 x10^3/uL


(0.0-0.7) 


 


 





 


Basophils # (Auto)


 0.0 x10^3/uL


(0.0-0.2) 


 


 





 


Sodium Level


 145 mmol/L


(136-145) 


 


 





 


Potassium Level


 3.2 mmol/L


(3.5-5.1) 


 


 





 


Chloride Level


 106 mmol/L


() 


 


 





 


Carbon Dioxide Level


 29 mmol/L


(21-32) 


 


 





 


Anion Gap 10 (6-14)    


 


Blood Urea Nitrogen


 34 mg/dL


(7-20) 


 


 





 


Creatinine


 1.1 mg/dL


(0.6-1.0) 


 


 





 


Estimated GFR


(Cockcroft-Gault) 52.8 


 


 


 





 


Glucose Level


 188 mg/dL


(70-99) 


 


 





 


Calcium Level


 9.9 mg/dL


(8.5-10.1) 


 


 





 


Phosphorus Level


 3.6 mg/dL


(2.6-4.7) 


 


 





 


Glucose (Fingerstick)


 


 189 mg/dL


(70-99) 


 











Laboratory Tests








Test


 5/31/20


12:23 5/31/20


17:24 6/1/20


00:04 6/1/20


05:30


 


Glucose (Fingerstick)


 225 mg/dL


(70-99) 244 mg/dL


(70-99) 175 mg/dL


(70-99) 





 


White Blood Count


 


 


 


 12.6 x10^3/uL


(4.0-11.0)


 


Red Blood Count


 


 


 


 3.07 x10^6/uL


(3.50-5.40)


 


Hemoglobin


 


 


 


 8.4 g/dL


(12.0-15.5)


 


Hematocrit


 


 


 


 26.7 %


(36.0-47.0)


 


Mean Corpuscular Volume    87 fL () 


 


Mean Corpuscular Hemoglobin    27 pg (25-35) 


 


Mean Corpuscular Hemoglobin


Concent 


 


 


 31 g/dL


(31-37)


 


Red Cell Distribution Width


 


 


 


 19.3 %


(11.5-14.5)


 


Platelet Count


 


 


 


 554 x10^3/uL


(140-400)


 


Neutrophils (%) (Auto)    68 % (31-73) 


 


Lymphocytes (%) (Auto)    20 % (24-48) 


 


Monocytes (%) (Auto)    10 % (0-9) 


 


Eosinophils (%) (Auto)    1 % (0-3) 


 


Basophils (%) (Auto)    0 % (0-3) 


 


Neutrophils # (Auto)


 


 


 


 8.6 x10^3/uL


(1.8-7.7)


 


Lymphocytes # (Auto)


 


 


 


 2.6 x10^3/uL


(1.0-4.8)


 


Monocytes # (Auto)


 


 


 


 1.3 x10^3/uL


(0.0-1.1)


 


Eosinophils # (Auto)


 


 


 


 0.1 x10^3/uL


(0.0-0.7)


 


Basophils # (Auto)


 


 


 


 0.0 x10^3/uL


(0.0-0.2)


 


Sodium Level


 


 


 


 145 mmol/L


(136-145)


 


Potassium Level


 


 


 


 3.2 mmol/L


(3.5-5.1)


 


Chloride Level


 


 


 


 106 mmol/L


()


 


Carbon Dioxide Level


 


 


 


 29 mmol/L


(21-32)


 


Anion Gap    10 (6-14) 


 


Blood Urea Nitrogen


 


 


 


 34 mg/dL


(7-20)


 


Creatinine


 


 


 


 1.1 mg/dL


(0.6-1.0)


 


Estimated GFR


(Cockcroft-Gault) 


 


 


 52.8 





 


Glucose Level


 


 


 


 188 mg/dL


(70-99)


 


Calcium Level


 


 


 


 9.9 mg/dL


(8.5-10.1)


 


Phosphorus Level


 


 


 


 3.6 mg/dL


(2.6-4.7)


 


Test


 6/1/20


05:48 


 


 





 


Glucose (Fingerstick)


 189 mg/dL


(70-99) 


 


 











Microbiology


5/30/20 Gram Stain - Final, Resulted


          


5/30/20 Aerobic Culture - Preliminary, Resulted


          


5/17/20 Blood Culture - Final, Complete


          NO GROWTH AFTER 5 DAYS


5/6/20 Fungal Culture - Final, Complete


         


5/6/20 Fungal Culture Result 1 - Final, Complete


         


4/12/20 Urine Culture - Final, Complete


          


4/12/20 Urine Culture Result 1 ([facility]) - Final, Complete


Medications





Current Medications


Sodium Chloride 1,000 ml @  1,000 mls/hr Q1H IV  Last administered on 3/16/20at 

03:00;  Start 3/16/20 at 03:00;  Stop 3/16/20 at 03:59;  Status DC


Ondansetron HCl (Zofran) 4 mg 1X  ONCE IVP  Last administered on 3/16/20at 

03:27;  Start 3/16/20 at 03:00;  Stop 3/16/20 at 03:01;  Status DC


Morphine Sulfate (Morphine Sulfate) 4 mg 1X  ONCE IV ;  Start 3/16/20 at 03:00; 

Stop 3/16/20 at 03:01;  Status Cancel


Ketorolac Tromethamine (Toradol 30mg Vial) 30 mg 1X  ONCE IV  Last administered 

on 3/16/20at 02:54;  Start 3/16/20 at 03:00;  Stop 3/16/20 at 03:01;  Status DC


Fentanyl Citrate (Fentanyl 2ml Vial) 25 mcg 1X  ONCE IVP  Last administered on 

3/16/20at 03:23;  Start 3/16/20 at 03:30;  Stop 3/16/20 at 03:31;  Status DC


Fentanyl Citrate (Fentanyl 2ml Vial) 100 mcg STK-MED ONCE .ROUTE ;  Start 

3/16/20 at 03:18;  Stop 3/16/20 at 03:18;  Status DC


Iohexol (Omnipaque 350 Mg/ml) 90 ml 1X  ONCE IV  Last administered on 3/16/20at 

03:25;  Start 3/16/20 at 03:30;  Stop 3/16/20 at 03:31;  Status DC


Info (CONTRAST GIVEN -- Rx MONITORING) 1 each PRN DAILY  PRN MC SEE COMMENTS;  

Start 3/16/20 at 03:30;  Stop 3/18/20 at 03:29;  Status DC


Hydromorphone HCl (Dilaudid) 0.5 mg 1X  ONCE IV  Last administered on 3/16/20at 

03:55;  Start 3/16/20 at 04:30;  Stop 3/16/20 at 04:32;  Status DC


Ondansetron HCl (Zofran) 4 mg PRN Q8HRS  PRN IV NAUSEA/VOMITING 1ST CHOICE;  

Start 3/16/20 at 05:00;  Stop 3/16/20 at 09:27;  Status DC


Morphine Sulfate (Morphine Sulfate) 2 mg PRN Q2HR  PRN IV SEVERE PAIN 7-10 Last 

administered on 3/17/20at 12:26;  Start 3/16/20 at 05:00;  Stop 3/17/20 at 

14:15;  Status DC


Sodium Chloride 1,000 ml @  125 mls/hr Q8H IV  Last administered on 3/16/20at 

20:56;  Start 3/16/20 at 05:00;  Stop 3/17/20 at 04:59;  Status DC


Hydromorphone HCl (Dilaudid) 0.5 mg PRN Q3HRS  PRN IV SEVERE PAIN 7-10 Last 

administered on 3/17/20at 10:06;  Start 3/16/20 at 05:00;  Stop 3/17/20 at 

12:01;  Status DC


Piperacillin Sod/ Tazobactam Sod 4.5 gm/Sodium Chloride 100 ml @  200 mls/hr 1X 

ONCE IV  Last administered on 3/16/20at 05:44;  Start 3/16/20 at 06:00;  Stop 

3/16/20 at 06:29;  Status DC


Ondansetron HCl (Zofran) 4 mg PRN Q4HRS  PRN IV NAUSEA/VOMITING 1ST CHOICE Last 

administered on 5/30/20at 09:18;  Start 3/16/20 at 09:30


Insulin Human Lispro (HumaLOG) 0-9 UNITS Q6HRS SQ  Last administered on 6/1/20at

05:50;  Start 3/16/20 at 09:30


Dextrose (Dextrose 50%-Water Syringe) 12.5 gm PRN Q15MIN  PRN IV SEE COMMENTS;  

Start 3/16/20 at 09:30


Pantoprazole Sodium (PROTONIX VIAL for IV PUSH) 40 mg DAILYAC IVP  Last 

administered on 6/1/20at 07:55;  Start 3/16/20 at 11:30


Prochlorperazine Edisylate (Compazine) 10 mg PRN Q6HRS  PRN IV NAUSEA/VOMITING, 

2nd CHOICE Last administered on 5/30/20at 01:26;  Start 3/16/20 at 17:45


Atenolol (Tenormin) 100 mg DAILY PO ;  Start 3/17/20 at 09:00;  Stop 3/16/20 at 

20:08;  Status DC


Metoprolol Tartrate (Lopressor Vial) 2.5 mg Q6HRS IVP  Last administered on 

3/17/20at 05:51;  Start 3/16/20 at 20:15;  Stop 3/17/20 at 10:02;  Status DC


Metoprolol Tartrate (Lopressor Vial) 5 mg Q6HRS IVP  Last administered on 

3/26/20at 00:12;  Start 3/17/20 at 10:15;  Stop 3/28/20 at 08:48;  Status DC


Hydromorphone HCl (Dilaudid) 1 mg PRN Q3HRS  PRN IV SEVERE PAIN 7-10 Last 

administered on 3/23/20at 05:13;  Start 3/17/20 at 12:00;  Stop 3/31/20 at 

00:25;  Status DC


Lidocaine HCl (Buffered Lidocaine 1%) 3 ml STK-MED ONCE .ROUTE ;  Start 3/17/20 

at 12:55;  Stop 3/17/20 at 12:56;  Status DC


Albumin Human 500 ml @  125 mls/hr 1X  ONCE IV  Last administered on 3/17/20at 

14:33;  Start 3/17/20 at 14:30;  Stop 3/17/20 at 18:32;  Status DC


Norepinephrine Bitartrate 8 mg/ Dextrose 258 ml @  17.299 mls/ hr CONT  PRN IV 

PER PROTOCOL Last administered on 4/14/20at 12:48;  Start 3/17/20 at 15:30;  

Stop 4/17/20 at 09:19;  Status DC


Sodium Chloride 1,000 ml @  125 mls/hr Q8H IV  Last administered on 3/17/20at 

21:04;  Start 3/17/20 at 16:00;  Stop 3/18/20 at 02:42;  Status DC


Albumin Human 500 ml @  125 mls/hr PRN BID  PRN IV After every 2L NSS & BP < 

90mm Last administered on 4/1/20at 14:21;  Start 3/17/20 at 16:00


Iohexol (Omnipaque 300 Mg/ml) 60 ml 1X  ONCE IV  Last administered on 3/17/20at 

17:20;  Start 3/17/20 at 17:00;  Stop 3/17/20 at 17:01;  Status DC


Info (CONTRAST GIVEN -- Rx MONITORING) 1 each PRN DAILY  PRN MC SEE COMMENTS;  

Start 3/17/20 at 17:00;  Stop 3/19/20 at 16:59;  Status DC


Meropenem 1 gm/ Sodium Chloride 100 ml @  200 mls/hr Q8HRS IV  Last administered

on 3/18/20at 05:45;  Start 3/17/20 at 20:00;  Stop 3/18/20 at 08:48;  Status DC


Furosemide (Lasix) 40 mg 1X  ONCE IVP  Last administered on 3/17/20at 22:12;  

Start 3/17/20 at 22:30;  Stop 3/17/20 at 22:31;  Status DC


Calcium Chloride 1000 mg/Sodium Chloride 110 ml @  220 mls/hr 1X  ONCE IV  Last 

administered on 3/17/20at 22:11;  Start 3/17/20 at 22:30;  Stop 3/17/20 at 

22:59;  Status DC


Albuterol Sulfate (Ventolin Neb Soln) 2.5 mg 1X  ONCE NEB  Last administered on 

3/18/20at 00:56;  Start 3/17/20 at 22:30;  Stop 3/17/20 at 22:31;  Status DC


Insulin Human Regular (HumuLIN R VIAL) 5 unit 1X  ONCE IV  Last administered on 

3/17/20at 22:14;  Start 3/17/20 at 22:30;  Stop 3/17/20 at 22:31;  Status DC


Magnesium Sulfate 50 ml @ 25 mls/hr 1X  ONCE IV  Last administered on 3/18/20at 

02:57;  Start 3/18/20 at 03:00;  Stop 3/18/20 at 04:59;  Status DC


Calcium Gluconate 1000 mg/Sodium Chloride 110 ml @  220 mls/hr 1X  ONCE IV  Last

administered on 3/18/20at 02:46;  Start 3/18/20 at 03:00;  Stop 3/18/20 at 03:2

9;  Status DC


Sodium Chloride 1,000 ml @  200 mls/hr Q5H IV  Last administered on 3/18/20at 

02:46;  Start 3/18/20 at 03:00;  Stop 3/18/20 at 10:21;  Status DC


Calcium Gluconate 1000 mg/Sodium Chloride 110 ml @  220 mls/hr 1X  ONCE IV  Last

administered on 3/18/20at 03:21;  Start 3/18/20 at 03:30;  Stop 3/18/20 at 

03:59;  Status DC


Sodium Bicarbonate 50 meq/Sodium Chloride 1,050 ml @  75 mls/hr Q14H IV  Last 

administered on 3/22/20at 21:10;  Start 3/18/20 at 07:30;  Stop 3/23/20 at 

10:28;  Status DC


Calcium Gluconate 2000 mg/Sodium Chloride 120 ml @  220 mls/hr 1X  ONCE IV  Last

administered on 3/18/20at 09:05;  Start 3/18/20 at 07:30;  Stop 3/18/20 at 

08:02;  Status DC


Lidocaine HCl (Xylocaine-Mpf 1% 2ml Vial) 2 ml STK-MED ONCE .ROUTE ;  Start 

3/18/20 at 08:47;  Stop 3/18/20 at 08:47;  Status DC


Meropenem 500 mg/ Sodium Chloride 50 ml @  100 mls/hr Q12HR IV  Last 

administered on 3/23/20at 21:01;  Start 3/18/20 at 18:00;  Stop 3/24/20 at 

07:58;  Status DC


Lidocaine HCl (Buffered Lidocaine 1%) 3 ml STK-MED ONCE .ROUTE ;  Start 3/18/20 

at 09:46;  Stop 3/18/20 at 09:46;  Status DC


Lidocaine HCl (Buffered Lidocaine 1%) 6 ml 1X  ONCE INJ  Last administered on 

3/18/20at 10:26;  Start 3/18/20 at 10:15;  Stop 3/18/20 at 10:16;  Status DC


Info (Tpn Per Pharmacy) 1 each PRN DAILY  PRN MC SEE COMMENTS Last administered 

on 5/31/20at 12:28;  Start 3/18/20 at 12:00


Sodium Chloride 1,000 ml @  1,000 mls/hr Q1H PRN IV hypotension;  Start 3/18/20 

at 12:07;  Stop 3/18/20 at 18:06;  Status DC


Diphenhydramine HCl (Benadryl) 25 mg 1X PRN  PRN IV ITCHING;  Start 3/18/20 at 

12:15;  Stop 3/19/20 at 12:14;  Status DC


Diphenhydramine HCl (Benadryl) 25 mg 1X PRN  PRN IV ITCHING;  Start 3/18/20 at 

12:15;  Stop 3/19/20 at 12:14;  Status DC


Sodium Chloride 1,000 ml @  400 mls/hr Q2H30M PRN IV PATENCY;  Start 3/18/20 at 

12:07;  Stop 3/19/20 at 00:06;  Status DC


Info (PHARMACY MONITORING -- do not chart) 1 each PRN DAILY  PRN MC SEE 

COMMENTS;  Start 3/18/20 at 12:15;  Stop 3/20/20 at 08:13;  Status DC


Sodium Chloride 90 meq/Calcium Gluconate 10 meq/ Multivitamins 10 ml/Chromium/ 

Copper/Manganese/ Seleni/Zn 1 ml/ Total Parenteral Nutrition/Amino 

Acids/Dextrose/ Fat Emulsion Intravenous 55.005 ml  @ 2.292 mls/hr TPN  CONT IV 

;  Start 3/18/20 at 22:00;  Stop 3/18/20 at 12:33;  Status DC


Info (Tpn Per Pharmacy) 1 each PRN DAILY  PRN MC SEE COMMENTS;  Start 3/18/20 at

12:30;  Status UNV


Sodium Chloride 90 meq/Calcium Gluconate 10 meq/ Multivitamins 10 ml/Chromium/ 

Copper/Manganese/ Seleni/Zn 0.5 ml/ Total Parenteral Nutrition/Amino 

Acids/Dextrose/ Fat Emulsion Intravenous 1,512 ml @  63 mls/hr TPN  CONT IV  

Last administered on 3/18/20at 22:06;  Start 3/18/20 at 22:00;  Stop 3/19/20 at 

21:59;  Status DC


Calcium Carbonate/ Glycine (Tums) 500 mg PRN AFTMEALHC  PRN PO INDIGESTION;  

Start 3/18/20 at 17:45;  Stop 5/13/20 at 10:25;  Status DC


Calcium Gluconate (Calcium Gluconate) 2,000 mg 1X  ONCE IVP  Last administered 

on 3/19/20at 02:19;  Start 3/19/20 at 02:15;  Stop 3/19/20 at 02:16;  Status DC


Calcium Chloride 3000 mg/Sodium Chloride 1,030 ml @  50 mls/hr [id number] IV  Last 

administered on 3/21/20at 02:17;  Start 3/19/20 at 08:00;  Stop 3/21/20 at 

15:23;  Status DC


Lorazepam (Ativan Inj) 1 mg PRN Q4HRS  PRN IVP ANXIETY / AGITATION, 2nd choic 

Last administered on 4/17/20at 03:51;  Start 3/19/20 at 09:00;  Stop 4/17/20 at 

09:19;  Status DC


Sodium Chloride 1,000 ml @  1,000 mls/hr Q1H PRN IV hypotension;  Start 3/19/20 

at 08:56;  Stop 3/19/20 at 14:55;  Status DC


Albumin Human 200 ml @  200 mls/hr 1X PRN  PRN IV Hypotension;  Start 3/19/20 at

09:00;  Stop 3/19/20 at 14:59;  Status DC


Diphenhydramine HCl (Benadryl) 25 mg 1X PRN  PRN IV ITCHING;  Start 3/19/20 at 

09:00;  Stop 3/20/20 at 08:59;  Status DC


Diphenhydramine HCl (Benadryl) 25 mg 1X PRN  PRN IV ITCHING;  Start 3/19/20 at 

09:00;  Stop 3/20/20 at 08:59;  Status DC


Sodium Chloride 1,000 ml @  400 mls/hr Q2H30M PRN IV PATENCY;  Start 3/19/20 at 

08:56;  Stop 3/19/20 at 20:55;  Status DC


Info (PHARMACY MONITORING -- do not chart) 1 each PRN DAILY  PRN MC SEE 

COMMENTS;  Start 3/19/20 at 09:00;  Status UNV


Info (PHARMACY MONITORING -- do not chart) 1 each PRN DAILY  PRN MC SEE 

COMMENTS;  Start 3/19/20 at 09:00;  Stop 3/20/20 at 08:13;  Status DC


Digoxin (Lanoxin) 500 mcg 1X  ONCE IV  Last administered on 3/19/20at 10:04;  

Start 3/19/20 at 10:00;  Stop 3/19/20 at 10:01;  Status DC


Digoxin (Lanoxin) 125 mcg 1X  ONCE IV  Last administered on 3/19/20at 17:10;  

Start 3/19/20 at 18:00;  Stop 3/19/20 at 18:01;  Status DC


Magnesium Sulfate 100 ml @  25 mls/hr 1X  ONCE IV  Last administered on 

3/19/20at 12:48;  Start 3/19/20 at 13:00;  Stop 3/19/20 at 16:59;  Status DC


Sodium Chloride 90 meq/Magnesium Sulfate 10 meq/ Calcium Gluconate 20 meq/ 

Multivitamins 10 ml/Chromium/ Copper/Manganese/ Seleni/Zn 0.5 ml/ Total 

Parenteral Nutrition/Amino Acids/Dextrose/ Fat Emulsion Intravenous 1,512 ml @  

63 mls/hr TPN  CONT IV  Last administered on 3/19/20at 22:25;  Start 3/19/20 at 

22:00;  Stop 3/20/20 at 21:59;  Status DC


Sodium Chloride 1,000 ml @  1,000 mls/hr Q1H PRN IV hypotension;  Start 3/20/20 

at 08:05;  Stop 3/20/20 at 14:04;  Status DC


Albumin Human 200 ml @  200 mls/hr 1X  ONCE IV  Last administered on 3/20/20at 

08:57;  Start 3/20/20 at 08:15;  Stop 3/20/20 at 09:14;  Status DC


Diphenhydramine HCl (Benadryl) 25 mg 1X PRN  PRN IV ITCHING;  Start 3/20/20 at 

08:15;  Stop 3/21/20 at 08:14;  Status DC


Diphenhydramine HCl (Benadryl) 25 mg 1X PRN  PRN IV ITCHING;  Start 3/20/20 at 

08:15;  Stop 3/21/20 at 08:14;  Status DC


Sodium Chloride 1,000 ml @  400 mls/hr Q2H30M PRN IV PATENCY;  Start 3/20/20 at 

08:05;  Stop 3/20/20 at 20:04;  Status DC


Info (PHARMACY MONITORING -- do not chart) 1 each PRN DAILY  PRN MC SEE 

COMMENTS;  Start 3/20/20 at 08:15;  Stop 3/24/20 at 07:57;  Status DC


Sodium Chloride 90 meq/Potassium Chloride 15 meq/ Potassium Phosphate 10 mmol/ 

Magnesium Sulfate 10 meq/Calcium Gluconate 20 meq/ Multivitamins 10 ml/Chromium/

Copper/Manganese/ Seleni/Zn 0.5 ml/ Total Parenteral Nutrition/Amino Acids/

Dextrose/ Fat Emulsion Intravenous 1,512 ml @  63 mls/hr TPN  CONT IV  Last 

administered on 3/20/20at 21:01;  Start 3/20/20 at 22:00;  Stop 3/21/20 at 

21:59;  Status DC


Potassium Chloride/Water 100 ml @  100 mls/hr 1X  ONCE IV  Last administered on 

3/20/20at 14:09;  Start 3/20/20 at 14:00;  Stop 3/20/20 at 14:59;  Status DC


Benzocaine (Hurricaine One) 1 spray 1X  ONCE MM  Last administered on 3/20/20at 

16:38;  Start 3/20/20 at 14:30;  Stop 3/20/20 at 14:31;  Status DC


Lidocaine HCl (Glydo (Lidocaine) Jelly) 1 thomas 1X  ONCE MM  Last administered on 

3/20/20at 16:38;  Start 3/20/20 at 14:30;  Stop 3/20/20 at 14:31;  Status DC


Linezolid/Dextrose 300 ml @  300 mls/hr Q12HR IV  Last administered on 3/26/20at

21:04;  Start 3/20/20 at 20:00;  Stop 3/27/20 at 07:50;  Status DC


Acetaminophen (Tylenol) 650 mg PRN Q6HRS  PRN PO MILD PAIN / TEMP;  Start 

3/21/20 at 03:30;  Stop 3/21/20 at 03:36;  Status DC


Acetaminophen (Tylenol) 650 mg PRN Q6HRS  PRN PEG MILD PAIN / TEMP Last 

administered on 4/16/20at 19:56;  Start 3/21/20 at 03:36;  Stop 5/13/20 at 

10:25;  Status DC


Sodium Chloride 1,000 ml @  1,000 mls/hr Q1H PRN IV hypotension;  Start 3/21/20 

at 07:50;  Stop 3/21/20 at 13:49;  Status DC


Albumin Human 200 ml @  200 mls/hr 1X PRN  PRN IV Hypotension;  Start 3/21/20 at

08:00;  Stop 3/21/20 at 13:59;  Status DC


Sodium Chloride (Normal Saline Flush) 10 ml 1X PRN  PRN IV AP catheter pack;  

Start 3/21/20 at 08:00;  Stop 3/22/20 at 07:59;  Status DC


Sodium Chloride (Normal Saline Flush) 10 ml 1X PRN  PRN IV  catheter pack;  

Start 3/21/20 at 08:00;  Stop 3/22/20 at 07:59;  Status DC


Sodium Chloride 1,000 ml @  400 mls/hr Q2H30M PRN IV PATENCY;  Start 3/21/20 at 

07:50;  Stop 3/21/20 at 19:49;  Status DC


Info (PHARMACY MONITORING -- do not chart) 1 each PRN DAILY  PRN MC SEE MIKEY

TS;  Start 3/21/20 at 08:00;  Status UNV


Info (PHARMACY MONITORING -- do not chart) 1 each PRN DAILY  PRN MC SEE 

COMMENTS;  Start 3/21/20 at 08:00;  Stop 3/23/20 at 08:25;  Status DC


Sodium Chloride 90 meq/Potassium Chloride 15 meq/ Potassium Phosphate 10 mmol/ 

Magnesium Sulfate 10 meq/Calcium Gluconate 20 meq/ Multivitamins 10 ml/Chromium/

Copper/Manganese/ Seleni/Zn 0.5 ml/ Total Parenteral Nutrition/Amino 

Acids/Dextrose/ Fat Emulsion Intravenous 1,512 ml @  63 mls/hr TPN  CONT IV  

Last administered on 3/21/20at 20:57;  Start 3/21/20 at 22:00;  Stop 3/22/20 at 

21:59;  Status DC


Sodium Chloride 90 meq/Potassium Chloride 15 meq/ Potassium Phosphate 15 mmol/ 

Magnesium Sulfate 10 meq/Calcium Gluconate 20 meq/ Multivitamins 10 ml/Chromium/

Copper/Manganese/ Seleni/Zn 0.5 ml/ Total Parenteral Nutrition/Amino 

Acids/Dextrose/ Fat Emulsion Intravenous 1,512 ml @  63 mls/hr TPN  CONT IV ;  S

tart 3/22/20 at 22:00;  Stop 3/22/20 at 14:16;  Status DC


Sodium Chloride 90 meq/Potassium Chloride 15 meq/ Potassium Phosphate 15 mmol/ 

Magnesium Sulfate 10 meq/Calcium Gluconate 20 meq/ Multivitamins 10 ml/Chromium/

Copper/Manganese/ Seleni/Zn 0.5 ml/ Total Parenteral Nutrition/Amino 

Acids/Dextrose/ Fat Emulsion Intravenous 1,200 ml @  50 mls/hr TPN  CONT IV ;  

Start 3/22/20 at 22:00;  Stop 3/22/20 at 14:17;  Status DC


Sodium Chloride 90 meq/Potassium Chloride 15 meq/ Potassium Phosphate 10 mmol/ 

Magnesium Sulfate 10 meq/Calcium Gluconate 20 meq/ Multivitamins 10 ml/Chromium/

Copper/Manganese/ Seleni/Zn 0.5 ml/ Total Parenteral Nutrition/Amino 

Acids/Dextrose/ Fat Emulsion Intravenous 1,200 ml @  50 mls/hr TPN  CONT IV  

Last administered on 3/22/20at 23:29;  Start 3/22/20 at 22:00;  Stop 3/23/20 at 

21:59;  Status DC


Sodium Chloride 1,000 ml @  1,000 mls/hr Q1H PRN IV hypotension;  Start 3/23/20 

at 07:28;  Stop 3/23/20 at 13:27;  Status DC


Albumin Human 200 ml @  200 mls/hr 1X  ONCE IV  Last administered on 3/23/20at 

08:51;  Start 3/23/20 at 07:30;  Stop 3/23/20 at 08:29;  Status DC


Diphenhydramine HCl (Benadryl) 25 mg 1X PRN  PRN IV ITCHING;  Start 3/23/20 at 

07:30;  Stop 3/24/20 at 07:29;  Status DC


Diphenhydramine HCl (Benadryl) 25 mg 1X PRN  PRN IV ITCHING;  Start 3/23/20 at 

07:30;  Stop 3/24/20 at 07:29;  Status DC


Sodium Chloride 1,000 ml @  400 mls/hr Q2H30M PRN IV PATENCY;  Start 3/23/20 at 

07:28;  Stop 3/23/20 at 19:27;  Status DC


Info (PHARMACY MONITORING -- do not chart) 1 each PRN DAILY  PRN MC SEE 

COMMENTS;  Start 3/23/20 at 07:30;  Stop 4/3/20 at 13:01;  Status DC


Metronidazole 100 ml @  100 mls/hr Q6HRS IV  Last administered on 4/8/20at 

06:26;  Start 3/23/20 at 08:30;  Stop 4/8/20 at 09:58;  Status DC


Micafungin Sodium 100 mg/Dextrose 100 ml @  100 mls/hr Q24H IV  Last 

administered on 4/30/20at 08:18;  Start 3/23/20 at 09:00;  Stop 4/30/20 at 

20:58;  Status DC


Propofol 0 ml @ As Directed STK-MED ONCE IV ;  Start 3/23/20 at 07:53;  Stop 

3/23/20 at 07:53;  Status DC


Etomidate (Amidate) 20 mg STK-MED ONCE IV ;  Start 3/23/20 at 07:53;  Stop 

3/23/20 at 07:54;  Status DC


Midazolam HCl (Versed) 5 mg STK-MED ONCE .ROUTE ;  Start 3/23/20 at 07:57;  Stop

3/23/20 at 07:57;  Status DC


Fentanyl Citrate 30 ml @ 0 mls/hr CONT  PRN IV SEE PROTOCOL Last administered on

4/17/20at 06:12;  Start 3/23/20 at 08:15;  Stop 4/17/20 at 09:19;  Status DC


Artificial Tears (Artificial Tears) 1 drop PRN Q1HR  PRN OU DRY EYE, 1st choice;

 Start 3/23/20 at 08:15;  Stop 4/29/20 at 05:31;  Status DC


Midazolam HCl 50 mg/Sodium Chloride 50 ml @ 0 mls/hr CONT  PRN IV SEE PROTOCOL 

Last administered on 3/26/20at 22:39;  Start 3/23/20 at 08:15;  Stop 3/28/20 at 

15:59;  Status DC


Etomidate (Amidate) 8 mg 1X  ONCE IV  Last administered on 3/23/20at 08:33;  

Start 3/23/20 at 08:30;  Stop 3/23/20 at 08:31;  Status DC


Succinylcholine Chloride (Anectine) 120 mg 1X  ONCE IV  Last administered on 

3/23/20at 08:34;  Start 3/23/20 at 08:30;  Stop 3/23/20 at 08:31;  Status DC


Midazolam HCl (Versed) 5 mg 1X  ONCE IV ;  Start 3/23/20 at 08:30;  Stop 3/23/20

at 08:31;  Status DC


Potassium Chloride 15 meq/ Bicarbonate Dialysis Soln w/ out KCl 5,007.5 ml  @ 

1,000 mls/ hr Q5H1M IV  Last administered on 3/24/20at 11:11;  Start 3/23/20 at 

12:00;  Stop 3/24/20 at 11:15;  Status DC


Potassium Chloride 15 meq/ Bicarbonate Dialysis Soln w/ out KCl 5,007.5 ml  @ 

1,000 mls/ hr Q5H1M IV  Last administered on 3/24/20at 11:12;  Start 3/23/20 at 

12:00;  Stop 3/24/20 at 11:17;  Status DC


Potassium Chloride 15 meq/ Bicarbonate Dialysis Soln w/ out KCl 5,007.5 ml  @ 

1,000 mls/ hr Q5H1M IV  Last administered on 3/24/20at 11:11;  Start 3/23/20 at 

12:00;  Stop 3/24/20 at 11:19;  Status DC


Sodium Chloride 90 meq/Potassium Chloride 15 meq/ Potassium Phosphate 10 mmol/ 

Magnesium Sulfate 10 meq/Calcium Gluconate 20 meq/ Multivitamins 10 ml/Chromium/

Copper/Manganese/ Seleni/Zn 0.5 ml/ Total Parenteral Nutrition/Amino 

Acids/Dextrose/ Fat Emulsion Intravenous 1,400 ml @  58.333 mls/ hr TPN  CONT IV

 Last administered on 3/23/20at 21:42;  Start 3/23/20 at 22:00;  Stop 3/24/20 at

21:59;  Status DC


Heparin Sodium (Porcine) (Heparin Sodium) 5,000 unit Q8HRS SQ  Last administered

on 3/28/20at 05:55;  Start 3/23/20 at 15:00;  Stop 3/28/20 at 13:28;  Status DC


Meropenem 500 mg/ Sodium Chloride 50 ml @  100 mls/hr Q6HRS IV  Last 

administered on 3/25/20at 06:00;  Start 3/24/20 at 09:00;  Stop 3/25/20 at 

07:29;  Status DC


Potassium Phosphate 20 mmol/ Sodium Chloride 106.6667 ml @  51.667 m... 1X  ONCE

IV  Last administered on 3/24/20at 11:22;  Start 3/24/20 at 10:15;  Stop 3/24/20

at 12:18;  Status DC


Acetaminophen (Tylenol Supp) 650 mg PRN Q6HRS  PRN MI MILD PAIN / TEMP > 100.3'F

Last administered on 5/5/20at 09:12;  Start 3/24/20 at 10:30


Potassium Chloride/Water 100 ml @  100 mls/hr Q1H IV  Last administered on 

3/24/20at 12:12;  Start 3/24/20 at 11:00;  Stop 3/24/20 at 12:59;  Status DC


Potassium Chloride 20 meq/ Bicarbonate Dialysis Soln w/ out KCl 5,010 ml @  

1,000 mls/hr Q5H1M IV  Last administered on 3/25/20at 08:48;  Start 3/24/20 at 

12:00;  Stop 3/25/20 at 13:03;  Status DC


Potassium Chloride 20 meq/ Bicarbonate Dialysis Soln w/ out KCl 5,010 ml @  

1,000 mls/hr Q5H1M IV  Last administered on 3/29/20at 14:52;  Start 3/24/20 at 

11:30;  Stop 3/29/20 at 19:59;  Status DC


Potassium Chloride 20 meq/ Bicarbonate Dialysis Soln w/ out KCl 5,010 ml @  1

,000 mls/hr Q5H1M IV  Last administered on 3/29/20at 14:53;  Start 3/24/20 at 

11:30;  Stop 3/29/20 at 19:59;  Status DC


Sodium Chloride 90 meq/Potassium Chloride 15 meq/ Potassium Phosphate 15 mmol/ 

Magnesium Sulfate 10 meq/Calcium Gluconate 15 meq/ Multivitamins 10 ml/Chromium/

Copper/Manganese/ Seleni/Zn 0.5 ml/ Total Parenteral Nutrition/Amino 

Acids/Dextrose/ Fat Emulsion Intravenous 1,400 ml @  58.333 mls/ hr TPN  CONT IV

 Last administered on 3/24/20at 22:17;  Start 3/24/20 at 22:00;  Stop 3/25/20 at

21:59;  Status DC


Cefepime HCl (Maxipime) 2 gm Q12HR IVP  Last administered on 4/7/20at 20:56;  

Start 3/25/20 at 09:00;  Stop 4/8/20 at 09:58;  Status DC


Daptomycin 500 mg/ Sodium Chloride 50 ml @  100 mls/hr Q48H IV  Last 

administered on 4/10/20at 09:57;  Start 3/25/20 at 08:30;  Stop 4/10/20 at 

10:07;  Status DC


Lidocaine HCl (Buffered Lidocaine 1%) 3 ml 1X  ONCE INJ  Last administered on 

3/25/20at 10:27;  Start 3/25/20 at 10:30;  Stop 3/25/20 at 10:31;  Status DC


Potassium Phosphate 20 mmol/ Sodium Chloride 106.6667 ml @  51.667 m... 1X  ONCE

IV  Last administered on 3/25/20at 12:51;  Start 3/25/20 at 13:00;  Stop 3/25/20

at 15:03;  Status DC


Sodium Chloride 90 meq/Potassium Chloride 15 meq/ Potassium Phosphate 18 mmol/ 

Magnesium Sulfate 8 meq/Calcium Gluconate 15 meq/ Multivitamins 10 ml/Chromium/ 

Copper/Manganese/ Seleni/Zn 0.5 ml/ Total Parenteral Nutrition/Amino 

Acids/Dextrose/ Fat Emulsion Intravenous 1,400 ml @  58.333 mls/ hr TPN  CONT IV

 Last administered on 3/25/20at 22:16;  Start 3/25/20 at 22:00;  Stop 3/26/20 at

21:59;  Status DC


Potassium Chloride 20 meq/ Bicarbonate Dialysis Soln w/ out KCl 5,010 ml @  

1,000 mls/hr Q5H1M IV  Last administered on 3/29/20at 14:54;  Start 3/25/20 at 

16:00;  Stop 3/29/20 at 19:59;  Status DC


Multi-Ingred Cream/Lotion/Oil/ Oint (Artificial Tears Eye Ointment) 1 thomas PRN 

Q1HR  PRN OU DRY EYE, 2nd choice Last administered on 4/13/20at 08:19;  Start 

3/25/20 at 17:30


Sodium Chloride 90 meq/Potassium Chloride 15 meq/ Potassium Phosphate 18 mmol/ 

Magnesium Sulfate 8 meq/Calcium Gluconate 15 meq/ Multivitamins 10 ml/Chromium/ 

Copper/Manganese/ Seleni/Zn 0.5 ml/ Total Parenteral Nutrition/Amino Acids

/Dextrose/ Fat Emulsion Intravenous 1,400 ml @  58.333 mls/ hr TPN  CONT IV  

Last administered on 3/26/20at 22:00;  Start 3/26/20 at 22:00;  Stop 3/27/20 at 

21:59;  Status DC


Albumin Human 500 ml @  125 mls/hr 1X  ONCE IV ;  Start 3/26/20 at 14:15;  Stop 

3/26/20 at 18:14;  Status DC


Sodium Chloride 90 meq/Potassium Chloride 15 meq/ Potassium Phosphate 18 mmol/ 

Magnesium Sulfate 8 meq/Calcium Gluconate 15 meq/ Multivitamins 10 ml/Chromium/ 

Copper/Manganese/ Seleni/Zn 0.5 ml/ Insulin Human Regular 10 unit/ Total 

Parenteral Nutrition/Amino Acids/Dextrose/ Fat Emulsion Intravenous 1,400 ml @  

58.333 mls/ hr TPN  CONT IV  Last administered on 3/27/20at 21:43;  Start 

3/27/20 at 22:00;  Stop 3/28/20 at 21:59;  Status DC


Lidocaine HCl (Buffered Lidocaine 1%) 3 ml STK-MED ONCE .ROUTE ;  Start 3/25/20 

at 10:00;  Stop 3/27/20 at 13:57;  Status DC


Midazolam HCl 100 mg/Sodium Chloride 100 ml @ 7 mls/hr CONT  PRN IV SEE PROTOCOL

Last administered on 4/8/20at 15:35;  Start 3/28/20 at 16:00


Sodium Chloride 90 meq/Potassium Chloride 15 meq/ Potassium Phosphate 18 mmol/ 

Magnesium Sulfate 8 meq/Calcium Gluconate 15 meq/ Multivitamins 10 ml/Chromium/ 

Copper/Manganese/ Seleni/Zn 0.5 ml/ Insulin Human Regular 15 unit/ Total 

Parenteral Nutrition/Amino Acids/Dextrose/ Fat Emulsion Intravenous 1,400 ml @  

58.333 mls/ hr TPN  CONT IV  Last administered on 3/28/20at 20:34;  Start 

3/28/20 at 22:00;  Stop 3/29/20 at 21:59;  Status DC


Info ([facility] Electrolyte Protocol) 1 ea CONT PRN  PRN MC PER PROTOCOL;  Start 

3/29/20 at 13:15


Sodium Chloride 90 meq/Potassium Chloride 15 meq/ Potassium Phosphate 18 mmol/ 

Magnesium Sulfate 8 meq/Calcium Gluconate 15 meq/ Multivitamins 10 ml/Chromium/ 

Copper/Manganese/ Seleni/Zn 0.5 ml/ Insulin Human Regular 15 unit/ Total 

Parenteral Nutrition/Amino Acids/Dextrose/ Fat Emulsion Intravenous 1,400 ml @  

58.333 mls/ hr TPN  CONT IV  Last administered on 3/29/20at 22:05;  Start 

3/29/20 at 22:00;  Stop 3/30/20 at 21:59;  Status DC


Potassium Chloride 15 meq/ Bicarbonate Dialysis Soln w/ out KCl 5,007.5 ml  @ 

1,000 mls/ hr Q5H1M IV  Last administered on 4/1/20at 18:14;  Start 3/29/20 at 

20:00;  Stop 4/2/20 at 13:08;  Status DC


Potassium Chloride 15 meq/ Bicarbonate Dialysis Soln w/ out KCl 5,007.5 ml  @ 

1,000 mls/ hr Q5H1M IV  Last administered on 4/1/20at 18:14;  Start 3/29/20 at 

20:00;  Stop 4/2/20 at 13:08;  Status DC


Potassium Chloride 15 meq/ Bicarbonate Dialysis Soln w/ out KCl 5,007.5 ml  @ 

1,000 mls/ hr Q5H1M IV  Last administered on 4/1/20at 18:14;  Start 3/29/20 at 

20:00;  Stop 4/2/20 at 13:08;  Status DC


Iohexol (Omnipaque 240 Mg/ml) 30 ml 1X  ONCE PO  Last administered on 3/30/20at 

11:30;  Start 3/30/20 at 11:30;  Stop 3/30/20 at 11:33;  Status DC


Info (CONTRAST GIVEN -- Rx MONITORING) 1 each PRN DAILY  PRN MC SEE COMMENTS;  

Start 3/30/20 at 11:45;  Stop 4/1/20 at 11:44;  Status DC


Sodium Chloride 90 meq/Potassium Chloride 15 meq/ Potassium Phosphate 18 mmol/ 

Magnesium Sulfate 8 meq/Calcium Gluconate 15 meq/ Multivitamins 10 ml/Chromium/ 

Copper/Manganese/ Seleni/Zn 0.5 ml/ Insulin Human Regular 15 unit/ Total Pa

renteral Nutrition/Amino Acids/Dextrose/ Fat Emulsion Intravenous 1,400 ml @  

58.333 mls/ hr TPN  CONT IV  Last administered on 3/30/20at 21:47;  Start 

3/30/20 at 22:00;  Stop 3/31/20 at 21:59;  Status DC


Sodium Chloride 90 meq/Potassium Chloride 15 meq/ Potassium Phosphate 18 mmol/ 

Magnesium Sulfate 8 meq/Calcium Gluconate 15 meq/ Multivitamins 10 ml/Chromium/ 

Copper/Manganese/ Seleni/Zn 0.5 ml/ Insulin Human Regular 20 unit/ Total 

Parenteral Nutrition/Amino Acids/Dextrose/ Fat Emulsion Intravenous 1,400 ml @  

58.333 mls/ hr TPN  CONT IV  Last administered on 3/31/20at 21:36;  Start 

3/31/20 at 22:00;  Stop 4/1/20 at 21:59;  Status DC


Alteplase, Recombinant (Cathflo For Central Catheter Clearance) 1 mg 1X  ONCE 

INT CAT  Last administered on 3/31/20at 20:03;  Start 3/31/20 at 19:30;  Stop 

3/31/20 at 19:46;  Status DC


Alteplase, Recombinant (Cathflo For Central Catheter Clearance) 1 mg 1X  ONCE 

INT CAT  Last administered on 3/31/20at 22:05;  Start 3/31/20 at 22:00;  Stop 

3/31/20 at 22:01;  Status DC


Sodium Chloride 90 meq/Potassium Chloride 15 meq/ Potassium Phosphate 18 mmol/ 

Magnesium Sulfate 8 meq/Calcium Gluconate 15 meq/ Multivitamins 10 ml/Chromium/ 

Copper/Manganese/ Seleni/Zn 0.5 ml/ Insulin Human Regular 20 unit/ Total 

Parenteral Nutrition/Amino Acids/Dextrose/ Fat Emulsion Intravenous 1,400 ml @  

58.333 mls/ hr TPN  CONT IV  Last administered on 4/1/20at 21:30;  Start 4/1/20 

at 22:00;  Stop 4/2/20 at 21:59;  Status DC


Dexmedetomidine HCl 400 mcg/ Sodium Chloride 100 ml @ 0 mls/hr CONT  PRN IV 

ANXIETY / AGITATION Last administered on 5/30/20at 12:57;  Start 4/2/20 at 

08:15;  Stop 5/30/20 at 18:31;  Status DC


Sodium Chloride 500 ml @  500 mls/hr 1X PRN  PRN IV ELEVATED BP, SEE COMMENTS;  

Start 4/2/20 at 08:15


Atropine Sulfate (ATROPINE 0.5mg SYRINGE) 0.5 mg PRN Q5MIN  PRN IV SEE COMMENTS;

 Start 4/2/20 at 08:15


Furosemide (Lasix) 20 mg 1X  ONCE IVP  Last administered on 4/2/20at 08:19;  

Start 4/2/20 at 08:15;  Stop 4/2/20 at 08:16;  Status DC


Lidocaine HCl (Buffered Lidocaine 1%) 3 ml STK-MED ONCE .ROUTE ;  Start 4/2/20 

at 08:39;  Stop 4/2/20 at 08:39;  Status DC


Lidocaine HCl (Buffered Lidocaine 1%) 6 ml 1X  ONCE INJ  Last administered on 

4/2/20at 09:05;  Start 4/2/20 at 09:00;  Stop 4/2/20 at 09:06;  Status DC


Sodium Chloride 90 meq/Potassium Chloride 15 meq/ Potassium Phosphate 18 mmol/ 

Magnesium Sulfate 8 meq/Calcium Gluconate 15 meq/ Multivitamins 10 ml/Chromium/ 

Copper/Manganese/ Seleni/Zn 0.5 ml/ Insulin Human Regular 20 unit/ Total 

Parenteral Nutrition/Amino Acids/Dextrose/ Fat Emulsion Intravenous 1,400 ml @  

58.333 mls/ hr TPN  CONT IV  Last administered on 4/2/20at 22:45;  Start 4/2/20 

at 22:00;  Stop 4/3/20 at 21:59;  Status DC


Sodium Chloride 1,000 ml @  1,000 mls/hr Q1H PRN IV hypotension;  Start 4/3/20 

at 07:30;  Stop 4/3/20 at 13:29;  Status DC


Albumin Human 200 ml @  200 mls/hr 1X PRN  PRN IV Hypotension Last administered 

on 4/3/20at 09:36;  Start 4/3/20 at 07:30;  Stop 4/3/20 at 13:29;  Status DC


Sodium Chloride (Normal Saline Flush) 10 ml 1X PRN  PRN IV AP catheter pack;  

Start 4/3/20 at 07:30;  Stop 4/3/20 at 21:29;  Status DC


Sodium Chloride (Normal Saline Flush) 10 ml 1X PRN  PRN IV  catheter pack;  

Start 4/3/20 at 07:30;  Stop 4/4/20 at 07:29;  Status DC


Sodium Chloride 1,000 ml @  400 mls/hr Q2H30M PRN IV PATENCY;  Start 4/3/20 at 

07:30;  Stop 4/3/20 at 19:29;  Status DC


Info (PHARMACY MONITORING -- do not chart) 1 each PRN DAILY  PRN MC SEE 

COMMENTS;  Start 4/3/20 at 07:30;  Stop 4/3/20 at 13:02;  Status DC


Info (PHARMACY MONITORING -- do not chart) 1 each PRN DAILY  PRN MC SEE 

COMMENTS;  Start 4/3/20 at 07:30;  Stop 4/5/20 at 12:45;  Status DC


Sodium Chloride 90 meq/Potassium Chloride 15 meq/ Potassium Phosphate 10 mmol/ 

Magnesium Sulfate 8 meq/Calcium Gluconate 15 meq/ Multivitamins 10 ml/Chromium/ 

Copper/Manganese/ Seleni/Zn 0.5 ml/ Insulin Human Regular 25 unit/ Total 

Parenteral Nutrition/Amino Acids/Dextrose/ Fat Emulsion Intravenous 1,400 ml @  

58.333 mls/ hr TPN  CONT IV  Last administered on 4/3/20at 22:19;  Start 4/3/20 

at 22:00;  Stop 4/4/20 at 21:59;  Status DC


Heparin Sodium (Porcine) (Heparin Sodium) 5,000 unit Q12HR SQ  Last administered

on 4/26/20at 08:59;  Start 4/3/20 at 21:00;  Stop 4/26/20 at 10:05;  Status DC


Ondansetron HCl (Zofran) 4 mg PRN Q6HRS  PRN IV NAUSEA/VOMITING;  Start 4/6/20 

at 07:00;  Stop 4/7/20 at 06:59;  Status DC


Fentanyl Citrate (Fentanyl 2ml Vial) 25 mcg PRN Q5MIN  PRN IV MILD PAIN 1-3;  

Start 4/6/20 at 07:00;  Stop 4/7/20 at 06:59;  Status DC


Fentanyl Citrate (Fentanyl 2ml Vial) 50 mcg PRN Q5MIN  PRN IV MODERATE TO SEVERE

PAIN;  Start 4/6/20 at 07:00;  Stop 4/7/20 at 06:59;  Status DC


Ringer's Solution 1,000 ml @  30 mls/hr Q24H IV ;  Start 4/6/20 at 07:00;  Stop 

4/6/20 at 18:59;  Status DC


Lidocaine HCl (Xylocaine-Mpf 1% 2ml Vial) 2 ml PRN 1X  PRN ID PRIOR TO IV START;

 Start 4/6/20 at 07:00;  Stop 4/7/20 at 06:59;  Status DC


Prochlorperazine Edisylate (Compazine) 5 mg PACU PRN  PRN IV NAUSEA, MRX1;  

Start 4/6/20 at 07:00;  Stop 4/7/20 at 06:59;  Status DC


Sodium Chloride 1,000 ml @  1,000 mls/hr Q1H PRN IV hypotension;  Start 4/4/20 

at 09:10;  Stop 4/4/20 at 15:09;  Status DC


Albumin Human 200 ml @  200 mls/hr 1X PRN  PRN IV Hypotension Last administered 

on 4/4/20at 10:10;  Start 4/4/20 at 09:15;  Stop 4/4/20 at 15:14;  Status DC


Sodium Chloride 1,000 ml @  400 mls/hr Q2H30M PRN IV PATENCY;  Start 4/4/20 at 

09:10;  Stop 4/4/20 at 21:09;  Status DC


Info (PHARMACY MONITORING -- do not chart) 1 each PRN DAILY  PRN MC SEE 

COMMENTS;  Start 4/4/20 at 09:15;  Stop 4/5/20 at 12:45;  Status DC


Info (PHARMACY MONITORING -- do not chart) 1 each PRN DAILY  PRN MC SEE 

COMMENTS;  Start 4/4/20 at 09:15;  Stop 4/5/20 at 12:45;  Status DC


Sodium Chloride 90 meq/Potassium Chloride 15 meq/ Potassium Phosphate 10 mmol/ 

Magnesium Sulfate 8 meq/Calcium Gluconate 15 meq/ Multivitamins 10 ml/Chromium/ 

Copper/Manganese/ Seleni/Zn 0.5 ml/ Insulin Human Regular 25 unit/ Total 

Parenteral Nutrition/Amino Acids/Dextrose/ Fat Emulsion Intravenous 1,400 ml @  

58.333 mls/ hr TPN  CONT IV  Last administered on 4/4/20at 22:10;  Start 4/4/20 

at 22:00;  Stop 4/5/20 at 21:59;  Status DC


Magnesium Sulfate 50 ml @ 25 mls/hr PRN DAILY  PRN IV for Mag < 1.7 on am labs 

Last administered on 4/20/20at 17:27;  Start 4/5/20 at 09:15


Sodium Chloride 90 meq/Potassium Chloride 15 meq/ Potassium Phosphate 10 mmol/ 

Magnesium Sulfate 8 meq/Calcium Gluconate 15 meq/ Multivitamins 10 ml/Chromium/ 

Copper/Manganese/ Seleni/Zn 0.5 ml/ Insulin Human Regular 25 unit/ Total 

Parenteral Nutrition/Amino Acids/Dextrose/ Fat Emulsion Intravenous 1,400 ml @  

58.333 mls/ hr TPN  CONT IV  Last administered on 4/5/20at 21:20;  Start 4/5/20 

at 22:00;  Stop 4/6/20 at 21:59;  Status DC


Sodium Chloride 1,000 ml @  1,000 mls/hr Q1H PRN IV hypotension;  Start 4/5/20 

at 12:23;  Stop 4/5/20 at 18:22;  Status DC


Albumin Human 200 ml @  200 mls/hr 1X  ONCE IV  Last administered on 4/5/20at 

13:34;  Start 4/5/20 at 12:30;  Stop 4/5/20 at 13:29;  Status DC


Diphenhydramine HCl (Benadryl) 25 mg 1X PRN  PRN IV ITCHING;  Start 4/5/20 at 

12:30;  Stop 4/6/20 at 12:29;  Status DC


Diphenhydramine HCl (Benadryl) 25 mg 1X PRN  PRN IV ITCHING;  Start 4/5/20 at 

12:30;  Stop 4/6/20 at 12:29;  Status DC


Info (PHARMACY MONITORING -- do not chart) 1 each PRN DAILY  PRN MC SEE MIKEY

TS;  Start 4/5/20 at 12:30;  Status Cancel


Bupivacaine HCl/ Epinephrine Bitart (Sensorcain-Epi 0.5%-1:737201 Mpf) 30 ml 

STK-MED ONCE .ROUTE  Last administered on 4/6/20at 11:44;  Start 4/6/20 at 

11:00;  Stop 4/6/20 at 11:01;  Status DC


Cellulose (Surgicel Fibrillar 1x2) 1 each STK-MED ONCE .ROUTE ;  Start 4/6/20 at

11:00;  Stop 4/6/20 at 11:01;  Status DC


Sodium Chloride 90 meq/Potassium Chloride 15 meq/ Potassium Phosphate 10 mmol/ 

Magnesium Sulfate 12 meq/Calcium Gluconate 15 meq/ Multivitamins 10 ml/Chromium/

Copper/Manganese/ Seleni/Zn 0.5 ml/ Insulin Human Regular 25 unit/ Total 

Parenteral Nutrition/Amino Acids/Dextrose/ Fat Emulsion Intravenous 1,400 ml @  

58.333 mls/ hr TPN  CONT IV  Last administered on 4/6/20at 22:24;  Start 4/6/20 

at 22:00;  Stop 4/7/20 at 21:59;  Status DC


Propofol 20 ml @ As Directed STK-MED ONCE IV ;  Start 4/6/20 at 11:07;  Stop 

4/6/20 at 11:07;  Status DC


Cellulose (Surgicel Hemostat 4x8) 1 each STK-MED ONCE .ROUTE  Last administered 

on 4/6/20at 11:44;  Start 4/6/20 at 11:55;  Stop 4/6/20 at 11:56;  Status DC


Sevoflurane (Ultane) 60 ml STK-MED ONCE IH ;  Start 4/6/20 at 12:46;  Stop 

4/6/20 at 12:46;  Status DC


Sodium Chloride 1,000 ml @  1,000 mls/hr Q1H PRN IV hypotension;  Start 4/6/20 

at 13:51;  Stop 4/6/20 at 19:50;  Status DC


Albumin Human 200 ml @  200 mls/hr 1X PRN  PRN IV Hypotension Last administered 

on 4/6/20at 14:51;  Start 4/6/20 at 14:00;  Stop 4/6/20 at 19:59;  Status DC


Diphenhydramine HCl (Benadryl) 25 mg 1X PRN  PRN IV ITCHING;  Start 4/6/20 at 

14:00;  Stop 4/7/20 at 13:59;  Status DC


Diphenhydramine HCl (Benadryl) 25 mg 1X PRN  PRN IV ITCHING;  Start 4/6/20 at 

14:00;  Stop 4/7/20 at 13:59;  Status DC


Sodium Chloride 1,000 ml @  400 mls/hr Q2H30M PRN IV PATENCY;  Start 4/6/20 at 

13:51;  Stop 4/7/20 at 01:50;  Status DC


Info (PHARMACY MONITORING -- do not chart) 1 each PRN DAILY  PRN MC SEE 

COMMENTS;  Start 4/6/20 at 14:00;  Stop 4/9/20 at 08:16;  Status DC


Heparin Sodium (Porcine) (Hep Lock Adult) 500 unit STK-MED ONCE IVP ;  Start 

4/7/20 at 09:29;  Stop 4/7/20 at 09:30;  Status DC


Sodium Chloride 1,000 ml @  1,000 mls/hr Q1H PRN IV hypotension;  Start 4/7/20 

at 10:43;  Stop 4/7/20 at 16:42;  Status DC


Sodium Chloride 1,000 ml @  400 mls/hr Q2H30M PRN IV PATENCY;  Start 4/7/20 at 

10:43;  Stop 4/7/20 at 22:42;  Status DC


Info (PHARMACY MONITORING -- do not chart) 1 each PRN DAILY  PRN MC SEE 

COMMENTS;  Start 4/7/20 at 10:45;  Status UNV


Info (PHARMACY MONITORING -- do not chart) 1 each PRN DAILY  PRN MC SEE 

COMMENTS;  Start 4/7/20 at 10:45;  Status UNV


Sodium Chloride 90 meq/Potassium Chloride 15 meq/ Magnesium Sulfate 12 

meq/Calcium Gluconate 15 meq/ Multivitamins 10 ml/Chromium/ Copper/Manganese/ 

Seleni/Zn 0.5 ml/ Insulin Human Regular 25 unit/ Total Parenteral 

Nutrition/Amino Acids/Dextrose/ Fat Emulsion Intravenous 1,400 ml @  58.333 mls/

hr TPN  CONT IV  Last administered on 4/7/20at 22:13;  Start 4/7/20 at 22:00;  

Stop 4/8/20 at 21:59;  Status DC


Sodium Chloride 1,000 ml @  1,000 mls/hr Q1H PRN IV hypotension;  Start 4/8/20 

at 07:50;  Stop 4/8/20 at 13:49;  Status DC


Albumin Human 200 ml @  200 mls/hr 1X  ONCE IV ;  Start 4/8/20 at 08:00;  Stop 

4/8/20 at 08:53;  Status DC


Diphenhydramine HCl (Benadryl) 25 mg 1X PRN  PRN IV ITCHING;  Start 4/8/20 at 

08:00;  Stop 4/9/20 at 07:59;  Status DC


Diphenhydramine HCl (Benadryl) 25 mg 1X PRN  PRN IV ITCHING;  Start 4/8/20 at 

08:00;  Stop 4/9/20 at 07:59;  Status DC


Info (PHARMACY MONITORING -- do not chart) 1 each PRN DAILY  PRN MC SEE 

COMMENTS;  Start 4/8/20 at 08:00;  Stop 4/9/20 at 08:16;  Status DC


Albumin Human 50 ml @ 50 mls/hr 1X  ONCE IV ;  Start 4/8/20 at 08:53;  Stop 

4/8/20 at 08:56;  Status DC


Albumin Human 200 ml @  50 mls/hr PRN 1X  PRN IV HYPOTENSION Last administered 

on 4/14/20at 11:54;  Start 4/8/20 at 09:00;  Stop 5/21/20 at 11:14;  Status DC


Meropenem 500 mg/ Sodium Chloride 50 ml @  100 mls/hr Q12H IV  Last administered

on 4/28/20at 10:45;  Start 4/8/20 at 10:00;  Stop 4/28/20 at 12:37;  Status DC


Sodium Chloride 90 meq/Magnesium Sulfate 12 meq/ Calcium Gluconate 15 meq/ 

Multivitamins 10 ml/Chromium/ Copper/Manganese/ Seleni/Zn 0.5 ml/ Insulin Human 

Regular 25 unit/ Total Parenteral Nutrition/Amino Acids/Dextrose/ Fat Emulsion 

Intravenous 1,400 ml @  58.333 mls/ hr TPN  CONT IV  Last administered on 

4/8/20at 21:41;  Start 4/8/20 at 22:00;  Stop 4/9/20 at 21:59;  Status DC


Sodium Chloride 1,000 ml @  1,000 mls/hr Q1H PRN IV hypotension;  Start 4/9/20 

at 07:58;  Stop 4/9/20 at 13:57;  Status DC


Albumin Human 200 ml @  200 mls/hr 1X PRN  PRN IV Hypotension Last administered 

on 4/9/20at 09:30;  Start 4/9/20 at 08:00;  Stop 4/9/20 at 13:59;  Status DC


Sodium Chloride 1,000 ml @  400 mls/hr Q2H30M PRN IV PATENCY;  Start 4/9/20 at 

07:58;  Stop 4/9/20 at 19:57;  Status DC


Info (PHARMACY MONITORING -- do not chart) 1 each PRN DAILY  PRN MC SEE 

COMMENTS;  Start 4/9/20 at 08:00;  Status Cancel


Info (PHARMACY MONITORING -- do not chart) 1 each PRN DAILY  PRN MC SEE 

COMMENTS;  Start 4/9/20 at 08:15;  Status UNV


Sodium Chloride 90 meq/Potassium Phosphate 5 mmol/ Magnesium Sulfate 12 

meq/Calcium Gluconate 15 meq/ Multivitamins 10 ml/Chromium/ Copper/Manganese/ 

Seleni/Zn 0.5 ml/ Insulin Human Regular 30 unit/ Total Parenteral 

Nutrition/Amino Acids/Dextrose/ Fat Emulsion Intravenous 1,400 ml @  58.333 mls/

hr TPN  CONT IV  Last administered on 4/9/20at 22:08;  Start 4/9/20 at 22:00;  

Stop 4/10/20 at 21:59;  Status DC


Linezolid/Dextrose 300 ml @  300 mls/hr Q12HR IV  Last administered on 4/20/20at

20:40;  Start 4/10/20 at 11:00;  Stop 4/21/20 at 08:10;  Status DC


Sodium Chloride 90 meq/Potassium Phosphate 15 mmol/ Magnesium Sulfate 12 

meq/Calcium Gluconate 15 meq/ Multivitamins 10 ml/Chromium/ Copper/Manganese/ 

Seleni/Zn 0.5 ml/ Insulin Human Regular 30 unit/ Total Parenteral 

Nutrition/Amino Acids/Dextrose/ Fat Emulsion Intravenous 1,400 ml @  58.333 mls/

hr TPN  CONT IV  Last administered on 4/10/20at 21:49;  Start 4/10/20 at 22:00; 

Stop 4/11/20 at 21:59;  Status DC


Sodium Chloride 90 meq/Potassium Phosphate 15 mmol/ Magnesium Sulfate 12 

meq/Calcium Gluconate 15 meq/ Multivitamins 10 ml/Chromium/ Copper/Manganese/ 

Seleni/Zn 0.5 ml/ Insulin Human Regular 40 unit/ Total Parenteral 

Nutrition/Amino Acids/Dextrose/ Fat Emulsion Intravenous 1,400 ml @  58.333 mls/

hr TPN  CONT IV  Last administered on 4/11/20at 21:21;  Start 4/11/20 at 22:00; 

Stop 4/12/20 at 21:59;  Status DC


Sodium Chloride 1,000 ml @  1,000 mls/hr Q1H PRN IV hypotension;  Start 4/11/20 

at 13:26;  Stop 4/11/20 at 19:25;  Status DC


Albumin Human 200 ml @  200 mls/hr 1X PRN  PRN IV Hypotension Last administered 

on 4/11/20at 15:00;  Start 4/11/20 at 13:30;  Stop 4/11/20 at 19:29;  Status DC


Sodium Chloride (Normal Saline Flush) 10 ml 1X PRN  PRN IV AP catheter pack;  

Start 4/11/20 at 13:30;  Stop 4/12/20 at 13:29;  Status DC


Sodium Chloride (Normal Saline Flush) 10 ml 1X PRN  PRN IV  catheter pack;  

Start 4/11/20 at 13:30;  Stop 4/12/20 at 13:29;  Status DC


Sodium Chloride 1,000 ml @  400 mls/hr Q2H30M PRN IV PATENCY;  Start 4/11/20 at 

13:26;  Stop 4/12/20 at 01:25;  Status DC


Info (PHARMACY MONITORING -- do not chart) 1 each PRN DAILY  PRN MC SEE 

COMMENTS;  Start 4/11/20 at 13:30;  Stop 4/11/20 at 13:33;  Status DC


Info (PHARMACY MONITORING -- do not chart) 1 each PRN DAILY  PRN MC SEE 

COMMENTS;  Start 4/11/20 at 13:30;  Stop 4/11/20 at 13:34;  Status DC


Sodium Chloride 90 meq/Potassium Phosphate 19 mmol/ Magnesium Sulfate 12 

meq/Calcium Gluconate 15 meq/ Multivitamins 10 ml/Chromium/ Copper/Manganese/ 

Seleni/Zn 0.5 ml/ Insulin Human Regular 40 unit/ Total Parenteral 

Nutrition/Amino Acids/Dextrose/ Fat Emulsion Intravenous 1,400 ml @  58.333 mls/

hr TPN  CONT IV  Last administered on 4/12/20at 21:54;  Start 4/12/20 at 22:00; 

Stop 4/13/20 at 21:59;  Status DC


Sodium Chloride 1,000 ml @  1,000 mls/hr Q1H PRN IV hypotension;  Start 4/13/20 

at 09:35;  Stop 4/13/20 at 15:34;  Status DC


Albumin Human 200 ml @  200 mls/hr 1X PRN  PRN IV Hypotension;  Start 4/13/20 at

09:45;  Stop 4/13/20 at 15:44;  Status DC


Diphenhydramine HCl (Benadryl) 25 mg 1X PRN  PRN IV ITCHING;  Start 4/13/20 at 

09:45;  Stop 4/14/20 at 09:44;  Status DC


Diphenhydramine HCl (Benadryl) 25 mg 1X PRN  PRN IV ITCHING;  Start 4/13/20 at 

09:45;  Stop 4/14/20 at 09:44;  Status DC


Sodium Chloride 1,000 ml @  400 mls/hr Q2H30M PRN IV PATENCY;  Start 4/13/20 at 

09:35;  Stop 4/13/20 at 21:34;  Status DC


Info (PHARMACY MONITORING -- do not chart) 1 each PRN DAILY  PRN MC SEE 

COMMENTS;  Start 4/13/20 at 09:45;  Status Cancel


Sodium Chloride 100 meq/Potassium Phosphate 19 mmol/ Magnesium Sulfate 12 me

q/Calcium Gluconate 15 meq/ Multivitamins 10 ml/Chromium/ Copper/Manganese/ 

Seleni/Zn 0.5 ml/ Insulin Human Regular 40 unit/ Potassium Chloride 20 meq/ 

Total Parenteral Nutrition/Amino Acids/Dextrose/ Fat Emulsion Intravenous 1,400 

ml @  58.333 mls/ hr TPN  CONT IV  Last administered on 4/13/20at 22:02;  Start 

4/13/20 at 22:00;  Stop 4/14/20 at 21:59;  Status DC


Furosemide (Lasix) 40 mg 1X  ONCE IVP  Last administered on 4/13/20at 14:39;  

Start 4/13/20 at 14:30;  Stop 4/13/20 at 14:31;  Status DC


Metronidazole 100 ml @  100 mls/hr Q8HRS IV  Last administered on 4/21/20at 

06:04;  Start 4/14/20 at 10:00;  Stop 4/21/20 at 08:10;  Status DC


Sodium Chloride 1,000 ml @  1,000 mls/hr Q1H PRN IV hypotension;  Start 4/14/20 

at 08:00;  Stop 4/14/20 at 13:59;  Status DC


Albumin Human 200 ml @  200 mls/hr 1X PRN  PRN IV Hypotension;  Start 4/14/20 at

08:00;  Stop 4/14/20 at 13:59;  Status DC


Sodium Chloride 1,000 ml @  400 mls/hr Q2H30M PRN IV PATENCY;  Start 4/14/20 at 

08:00;  Stop 4/14/20 at 19:59;  Status DC


Info (PHARMACY MONITORING -- do not chart) 1 each PRN DAILY  PRN MC SEE 

COMMENTS;  Start 4/14/20 at 11:30;  Status UNV


Info (PHARMACY MONITORING -- do not chart) 1 each PRN DAILY  PRN MC SEE 

COMMENTS;  Start 4/14/20 at 11:30;  Stop 4/16/20 at 12:13;  Status DC


Sodium Chloride 100 meq/Potassium Phosphate 19 mmol/ Magnesium Sulfate 12 

meq/Calcium Gluconate 15 meq/ Multivitamins 10 ml/Chromium/ Copper/Manganese/ 

Seleni/Zn 0.5 ml/ Insulin Human Regular 40 unit/ Potassium Chloride 20 meq/ 

Total Parenteral Nutrition/Amino Acids/Dextrose/ Fat Emulsion Intravenous 1,400 

ml @  58.333 mls/ hr TPN  CONT IV  Last administered on 4/14/20at 21:52;  Start 

4/14/20 at 22:00;  Stop 4/15/20 at 21:59;  Status DC


Sodium Chloride (Normal Saline Flush) 10 ml QSHIFT  PRN IV AFTER MEDS AND BLOOD 

DRAWS;  Start 4/14/20 at 15:00;  Stop 5/12/20 at 11:27;  Status DC


Sodium Chloride (Normal Saline Flush) 10 ml PRN Q5MIN  PRN IV AFTER MEDS AND 

BLOOD DRAWS;  Start 4/14/20 at 15:00


Sodium Chloride (Normal Saline Flush) 20 ml PRN Q5MIN  PRN IV AFTER MEDS AND 

BLOOD DRAWS;  Start 4/14/20 at 15:00


Sodium Chloride 100 meq/Potassium Phosphate 19 mmol/ Magnesium Sulfate 12 

meq/Calcium Gluconate 15 meq/ Multivitamins 10 ml/Chromium/ Copper/Manganese/ 

Seleni/Zn 0.5 ml/ Insulin Human Regular 40 unit/ Potassium Chloride 20 meq/ 

Total Parenteral Nutrition/Amino Acids/Dextrose/ Fat Emulsion Intravenous 1,400 

ml @  58.333 mls/ hr TPN  CONT IV  Last administered on 4/15/20at 21:20;  Start 

4/15/20 at 22:00;  Stop 4/16/20 at 21:59;  Status DC


Lidocaine HCl (Buffered Lidocaine 1%) 3 ml STK-MED ONCE .ROUTE ;  Start 4/15/20 

at 13:16;  Stop 4/15/20 at 13:16;  Status DC


Lidocaine HCl (Buffered Lidocaine 1%) 6 ml 1X  ONCE INJ  Last administered on 

4/15/20at 13:45;  Start 4/15/20 at 13:30;  Stop 4/15/20 at 13:31;  Status DC


Albumin Human 100 ml @  100 mls/hr 1X  ONCE IV  Last administered on 4/15/20at 

15:41;  Start 4/15/20 at 15:00;  Stop 4/15/20 at 15:59;  Status DC


Albumin Human 50 ml @ 50 mls/hr 1X  ONCE IV  Last administered on 4/15/20at 

15:00;  Start 4/15/20 at 15:00;  Stop 4/15/20 at 15:59;  Status DC


Info (PHARMACY MONITORING -- do not chart) 1 each PRN DAILY  PRN MC SEE 

COMMENTS;  Start 4/16/20 at 11:30;  Status Cancel


Info (PHARMACY MONITORING -- do not chart) 1 each PRN DAILY  PRN MC SEE 

COMMENTS;  Start 4/16/20 at 11:30;  Status UNV


Sodium Chloride 100 meq/Potassium Phosphate 10 mmol/ Magnesium Sulfate 12 

meq/Calcium Gluconate 15 meq/ Multivitamins 10 ml/Chromium/ Copper/Manganese/ 

Seleni/Zn 0.5 ml/ Insulin Human Regular 35 unit/ Potassium Chloride 20 meq/ 

Total Parenteral Nutrition/Amino Acids/Dextrose/ Fat Emulsion Intravenous 1,400 

ml @  58.333 mls/ hr TPN  CONT IV  Last administered on 4/16/20at 22:10;  Start 

4/16/20 at 22:00;  Stop 4/17/20 at 21:59;  Status DC


Sodium Chloride 100 meq/Potassium Phosphate 5 mmol/ Magnesium Sulfate 12 

meq/Calcium Gluconate 15 meq/ Multivitamins 10 ml/Chromium/ Copper/Manganese/ 

Seleni/Zn 0.5 ml/ Insulin Human Regular 35 unit/ Potassium Chloride 20 meq/ 

Total Parenteral Nutrition/Amino Acids/Dextrose/ Fat Emulsion Intravenous 1,400 

ml @  58.333 mls/ hr TPN  CONT IV  Last administered on 4/17/20at 22:59;  Start 

4/17/20 at 22:00;  Stop 4/18/20 at 21:59;  Status DC


Sodium Chloride 1,000 ml @  1,000 mls/hr Q1H PRN IV hypotension;  Start 4/18/20 

at 08:27;  Stop 4/18/20 at 14:26;  Status DC


Albumin Human 200 ml @  200 mls/hr 1X PRN  PRN IV Hypotension Last administered 

on 4/18/20at 09:18;  Start 4/18/20 at 08:30;  Stop 4/18/20 at 14:29;  Status DC


Sodium Chloride 1,000 ml @  400 mls/hr Q2H30M PRN IV PATENCY;  Start 4/18/20 at 

08:27;  Stop 4/18/20 at 20:26;  Status DC


Info (PHARMACY MONITORING -- do not chart) 1 each PRN DAILY  PRN MC SEE 

COMMENTS;  Start 4/18/20 at 08:30;  Status Cancel


Info (PHARMACY MONITORING -- do not chart) 1 each PRN DAILY  PRN MC SEE 

COMMENTS;  Start 4/18/20 at 08:30;  Stop 4/26/20 at 13:10;  Status DC


Sodium Chloride 100 meq/Potassium Chloride 40 meq/ Magnesium Sulfate 15 

meq/Calcium Gluconate 15 meq/ Multivitamins 10 ml/Chromium/ Copper/Manganese/ 

Seleni/Zn 0.5 ml/ Insulin Human Regular 35 unit/ Total Parenteral Nutrition

/Amino Acids/Dextrose/ Fat Emulsion Intravenous 1,400 ml @  58.333 mls/ hr TPN  

CONT IV  Last administered on 4/18/20at 22:00;  Start 4/18/20 at 22:00;  Stop 

4/19/20 at 21:59;  Status DC


Potassium Chloride/Water 100 ml @  100 mls/hr 1X  ONCE IV  Last administered on 

4/18/20at 17:28;  Start 4/18/20 at 14:45;  Stop 4/18/20 at 15:44;  Status DC


Sodium Chloride 100 meq/Potassium Chloride 40 meq/ Magnesium Sulfate 15 

meq/Calcium Gluconate 15 meq/ Multivitamins 10 ml/Chromium/ Copper/Manganese/ 

Seleni/Zn 0.5 ml/ Insulin Human Regular 35 unit/ Total Parenteral 

Nutrition/Amino Acids/Dextrose/ Fat Emulsion Intravenous 1,400 ml @  58.333 mls/

hr TPN  CONT IV  Last administered on 4/19/20at 22:46;  Start 4/19/20 at 22:00; 

Stop 4/20/20 at 21:59;  Status DC


Sodium Chloride 100 meq/Potassium Chloride 40 meq/ Magnesium Sulfate 20 

meq/Calcium Gluconate 15 meq/ Multivitamins 10 ml/Chromium/ Copper/Manganese/ 

Seleni/Zn 0.5 ml/ Insulin Human Regular 35 unit/ Total Parenteral 

Nutrition/Amino Acids/Dextrose/ Fat Emulsion Intravenous 1,400 ml @  58.333 mls/

hr TPN  CONT IV  Last administered on 4/20/20at 22:31;  Start 4/20/20 at 22:00; 

Stop 4/21/20 at 21:59;  Status DC


Fentanyl Citrate (Fentanyl 2ml Vial) 50 mcg PRN Q2HR  PRN IVP PAIN Last 

administered on 4/27/20at 13:32;  Start 4/20/20 at 21:00;  Stop 4/28/20 at 

12:53;  Status DC


Fentanyl Citrate (Fentanyl 2ml Vial) 25 mcg PRN Q2HR  PRN IVP PAIN;  Start 

4/20/20 at 21:00;  Stop 4/28/20 at 12:54;  Status DC


Enoxaparin Sodium (Lovenox 100mg Syringe) 100 mg Q12HR SQ ;  Start 4/21/20 at 

21:00;  Status UNV


Amino Acids/ Glycerin/ Electrolytes 1,000 ml @  75 mls/hr [id number] IV ;  Start 

4/20/20 at 21:15;  Status UNV


Sodium Chloride 1,000 ml @  1,000 mls/hr Q1H PRN IV hypotension;  Start 4/21/20 

at 07:56;  Stop 4/21/20 at 13:55;  Status DC


Albumin Human 200 ml @  200 mls/hr 1X PRN  PRN IV Hypotension Last administered 

on 4/21/20at 08:40;  Start 4/21/20 at 08:00;  Stop 4/21/20 at 13:59;  Status DC


Sodium Chloride 1,000 ml @  400 mls/hr Q2H30M PRN IV PATENCY;  Start 4/21/20 at 

07:56;  Stop 4/21/20 at 19:55;  Status DC


Info (PHARMACY MONITORING -- do not chart) 1 each PRN DAILY  PRN MC SEE 

COMMENTS;  Start 4/21/20 at 08:00;  Status UNV


Info (PHARMACY MONITORING -- do not chart) 1 each PRN DAILY  PRN MC SEE 

COMMENTS;  Start 4/21/20 at 08:00;  Status UNV


Daptomycin 430 mg/ Sodium Chloride 50 ml @  100 mls/hr Q24H IV  Last 

administered on 4/21/20at 12:35;  Start 4/21/20 at 09:00;  Stop 4/21/20 at 

12:49;  Status DC


Sodium Chloride 100 meq/Potassium Chloride 40 meq/ Magnesium Sulfate 20 

meq/Calcium Gluconate 15 meq/ Multivitamins 10 ml/Chromium/ Copper/Manganese/ 

Seleni/Zn 0.5 ml/ Insulin Human Regular 35 unit/ Total Parenteral 

Nutrition/Amino Acids/Dextrose/ Fat Emulsion Intravenous 1,400 ml @  58.333 mls/

hr TPN  CONT IV  Last administered on 4/21/20at 21:26;  Start 4/21/20 at 22:00; 

Stop 4/22/20 at 21:59;  Status DC


Daptomycin 430 mg/ Sodium Chloride 50 ml @  100 mls/hr Q48H IV ;  Start 4/23/20 

at 09:00;  Stop 4/22/20 at 11:55;  Status DC


Sodium Chloride 100 meq/Potassium Chloride 40 meq/ Magnesium Sulfate 20 

meq/Calcium Gluconate 15 meq/ Multivitamins 10 ml/Chromium/ Copper/Manganese/ 

Seleni/Zn 0.5 ml/ Insulin Human Regular 35 unit/ Total Parenteral 

Nutrition/Amino Acids/Dextrose/ Fat Emulsion Intravenous 1,400 ml @  58.333 mls/

hr TPN  CONT IV  Last administered on 4/22/20at 22:27;  Start 4/22/20 at 22:00; 

Stop 4/23/20 at 21:59;  Status DC


Daptomycin 430 mg/ Sodium Chloride 50 ml @  100 mls/hr Q24H IV  Last 

administered on 4/24/20at 15:07;  Start 4/22/20 at 13:00;  Stop 4/25/20 at 

13:15;  Status DC


Sodium Chloride 100 meq/Potassium Chloride 40 meq/ Magnesium Sulfate 20 

meq/Calcium Gluconate 10 meq/ Multivitamins 10 ml/Chromium/ Copper/Manganese/ 

Seleni/Zn 0.5 ml/ Insulin Human Regular 35 unit/ Total Parenteral 

Nutrition/Amino Acids/Dextrose/ Fat Emulsion Intravenous 1,400 ml @  58.333 mls/

hr TPN  CONT IV  Last administered on 4/24/20at 00:06;  Start 4/23/20 at 22:00; 

Stop 4/24/20 at 21:59;  Status DC


Alteplase, Recombinant (Cathflo For Central Catheter Clearance) 1 mg 1X  ONCE 

INT CAT  Last administered on 4/24/20at 11:44;  Start 4/24/20 at 10:45;  Stop 

4/24/20 at 10:46;  Status DC


Ondansetron HCl (Zofran) 4 mg PRN Q6HRS  PRN IV NAUSEA/VOMITING;  Start 4/27/20 

at 07:00;  Stop 4/28/20 at 06:59;  Status DC


Fentanyl Citrate (Fentanyl 2ml Vial) 25 mcg PRN Q5MIN  PRN IV MILD PAIN 1-3;  

Start 4/27/20 at 07:00;  Stop 4/28/20 at 06:59;  Status DC


Fentanyl Citrate (Fentanyl 2ml Vial) 50 mcg PRN Q5MIN  PRN IV MODERATE TO SEVERE

PAIN Last administered on 4/27/20at 10:17;  Start 4/27/20 at 07:00;  Stop 

4/28/20 at 06:59;  Status DC


Ringer's Solution 1,000 ml @  30 mls/hr Q24H IV ;  Start 4/27/20 at 07:00;  Stop

4/27/20 at 18:59;  Status DC


Lidocaine HCl (Xylocaine-Mpf 1% 2ml Vial) 2 ml PRN 1X  PRN ID PRIOR TO IV START;

 Start 4/27/20 at 07:00;  Stop 4/28/20 at 06:59;  Status DC


Prochlorperazine Edisylate (Compazine) 5 mg PACU PRN  PRN IV NAUSEA, MRX1;  

Start 4/27/20 at 07:00;  Stop 4/28/20 at 06:59;  Status DC


Sodium Acetate 50 meq/Potassium Acetate 55 meq/ Magnesium Sulfate 20 meq/Calcium

Gluconate 10 meq/ Multivitamins 10 ml/Chromium/ Copper/Manganese/ Seleni/Zn 0.5 

ml/ Insulin Human Regular 35 unit/ Total Parenteral Nutrition/Amino Acids/

Dextrose/ Fat Emulsion Intravenous 1,400 ml @  58.333 mls/ hr TPN  CONT IV ;  

Start 4/24/20 at 22:00;  Stop 4/24/20 at 14:15;  Status DC


Sodium Acetate 50 meq/Potassium Acetate 55 meq/ Magnesium Sulfate 20 meq/Calcium

Gluconate 10 meq/ Multivitamins 10 ml/Chromium/ Copper/Manganese/ Seleni/Zn 0.5 

ml/ Insulin Human Regular 35 unit/ Total Parenteral Nutrition/Amino 

Acids/Dextrose/ Fat Emulsion Intravenous 1,800 ml @  75 mls/hr TPN  CONT IV  

Last administered on 4/24/20at 22:38;  Start 4/24/20 at 22:00;  Stop 4/25/20 at 

21:59;  Status DC


Sodium Chloride 1,000 ml @  1,000 mls/hr Q1H PRN IV hypotension;  Start 4/24/20 

at 15:31;  Stop 4/24/20 at 21:30;  Status DC


Diphenhydramine HCl (Benadryl) 25 mg 1X PRN  PRN IV ITCHING;  Start 4/24/20 at 

15:45;  Stop 4/25/20 at 15:44;  Status DC


Diphenhydramine HCl (Benadryl) 25 mg 1X PRN  PRN IV ITCHING;  Start 4/24/20 at 

15:45;  Stop 4/25/20 at 15:44;  Status DC


Sodium Chloride 1,000 ml @  400 mls/hr Q2H30M PRN IV PATENCY;  Start 4/24/20 at 

15:31;  Stop 4/25/20 at 03:30;  Status DC


Info (PHARMACY MONITORING -- do not chart) 1 each PRN DAILY  PRN MC SEE 

COMMENTS;  Start 4/24/20 at 15:45;  Stop 5/26/20 at 14:14;  Status DC


Sodium Acetate 50 meq/Potassium Acetate 55 meq/ Magnesium Sulfate 20 meq/Calcium

Gluconate 10 meq/ Multivitamins 10 ml/Chromium/ Copper/Manganese/ Seleni/Zn 0.5 

ml/ Insulin Human Regular 35 unit/ Total Parenteral Nutrition/Amino Acids/D

extrose/ Fat Emulsion Intravenous 1,800 ml @  75 mls/hr TPN  CONT IV  Last 

administered on 4/25/20at 22:03;  Start 4/25/20 at 22:00;  Stop 4/26/20 at 

21:59;  Status DC


Daptomycin 430 mg/ Sodium Chloride 50 ml @  100 mls/hr Q24H IV  Last 

administered on 4/30/20at 13:00;  Start 4/25/20 at 13:00;  Stop 4/30/20 at 

20:58;  Status DC


Heparin Sodium (Porcine) 1000 unit/Sodium Chloride 1,001 ml @  1,001 mls/hr 1X  

ONCE IRR ;  Start 4/27/20 at 06:00;  Stop 4/27/20 at 06:59;  Status DC


Potassium Acetate 55 meq/Magnesium Sulfate 20 meq/ Calcium Gluconate 10 meq/ 

Multivitamins 10 ml/Chromium/ Copper/Manganese/ Seleni/Zn 0.5 ml/ Insulin Human 

Regular 35 unit/ Total Parenteral Nutrition/Amino Acids/Dextrose/ Fat Emulsion 

Intravenous 1,920 ml @  80 mls/hr TPN  CONT IV  Last administered on 4/26/20at 

22:10;  Start 4/26/20 at 22:00;  Stop 4/27/20 at 21:59;  Status DC


Dexamethasone Sodium Phosphate (Decadron) 4 mg STK-MED ONCE .ROUTE ;  Start 

4/27/20 at 10:56;  Stop 4/27/20 at 10:57;  Status DC


Ondansetron HCl (Zofran) 4 mg STK-MED ONCE .ROUTE ;  Start 4/27/20 at 10:56;  

Stop 4/27/20 at 10:57;  Status DC


Rocuronium Bromide (Zemuron) 50 mg STK-MED ONCE .ROUTE ;  Start 4/27/20 at 

10:56;  Stop 4/27/20 at 10:57;  Status DC


Fentanyl Citrate (Fentanyl 2ml Vial) 100 mcg STK-MED ONCE .ROUTE ;  Start 

4/27/20 at 10:56;  Stop 4/27/20 at 10:57;  Status DC


Bupivacaine HCl/ Epinephrine Bitart (Sensorcain-Epi 0.5%-1:960073 Mpf) 30 ml 

STK-MED ONCE .ROUTE  Last administered on 4/27/20at 12:01;  Start 4/27/20 at 

10:58;  Stop 4/27/20 at 10:58;  Status DC


Cellulose (Surgicel Hemostat 2x14) 1 each STK-MED ONCE .ROUTE ;  Start 4/27/20 

at 10:58;  Stop 4/27/20 at 10:59;  Status DC


Iohexol (Omnipaque 300 Mg/ml) 50 ml STK-MED ONCE .ROUTE ;  Start 4/27/20 at 

10:58;  Stop 4/27/20 at 10:59;  Status DC


Cellulose (Surgicel Hemostat 4x8) 1 each STK-MED ONCE .ROUTE ;  Start 4/27/20 at

10:58;  Stop 4/27/20 at 10:59;  Status DC


Bisacodyl (Dulcolax Supp) 10 mg STK-MED ONCE .ROUTE ;  Start 4/27/20 at 10:59;  

Stop 4/27/20 at 10:59;  Status DC


Heparin Sodium (Porcine) 1000 unit/Sodium Chloride 1,001 ml @  1,001 mls/hr 1X  

ONCE IRR ;  Start 4/27/20 at 12:00;  Stop 4/27/20 at 12:59;  Status DC


Propofol 20 ml @ As Directed STK-MED ONCE IV ;  Start 4/27/20 at 11:05;  Stop 

4/27/20 at 11:05;  Status DC


Sevoflurane (Ultane) 90 ml STK-MED ONCE IH ;  Start 4/27/20 at 11:05;  Stop 

4/27/20 at 11:05;  Status DC


Sevoflurane (Ultane) 60 ml STK-MED ONCE IH ;  Start 4/27/20 at 12:26;  Stop 

4/27/20 at 12:27;  Status DC


Propofol 20 ml @ As Directed STK-MED ONCE IV ;  Start 4/27/20 at 12:26;  Stop 

4/27/20 at 12:27;  Status DC


Phenylephrine HCl (PHENYLEPHRINE in 0.9% NACL PF) 1 mg STK-MED ONCE IV ;  Start 

4/27/20 at 12:34;  Stop 4/27/20 at 12:34;  Status DC


Heparin Sodium (Porcine) (Heparin Sodium) 5,000 unit Q12HR SQ  Last administered

on 5/6/20at 20:57;  Start 4/27/20 at 21:00;  Stop 5/7/20 at 09:59;  Status DC


Sodium Chloride (Normal Saline Flush) 3 ml QSHIFT  PRN IV AFTER MEDS AND BLOOD 

DRAWS;  Start 4/27/20 at 13:45


Naloxone HCl (Narcan) 0.4 mg PRN Q2MIN  PRN IV SEE INSTRUCTIONS;  Start 4/27/20 

at 13:45


Sodium Chloride 1,000 ml @  25 mls/hr Q24H IV  Last administered on 5/26/20at 

13:37;  Start 4/27/20 at 13:37;  Stop 5/29/20 at 13:09;  Status DC


Naloxone HCl (Narcan) 0.4 mg PRN Q2MIN  PRN IV SEE INSTRUCTIONS;  Start 4/27/20 

at 14:30;  Status UNV


Sodium Chloride 1,000 ml @  25 mls/hr Q24H IV ;  Start 4/27/20 at 14:30;  Status

UNV


Hydromorphone HCl 30 ml @ 0 mls/hr CONT PRN  PRN IV PER PROTOCOL Last 

administered on 5/2/20at 16:08;  Start 4/27/20 at 14:30;  Stop 5/4/20 at 08:55; 

Status DC


Potassium Acetate 55 meq/Magnesium Sulfate 20 meq/ Calcium Gluconate 10 meq/ 

Multivitamins 10 ml/Chromium/ Copper/Manganese/ Seleni/Zn 0.5 ml/ Insulin Human 

Regular 35 unit/ Total Parenteral Nutrition/Amino Acids/Dextrose/ Fat Emulsion 

Intravenous 1,920 ml @  80 mls/hr TPN  CONT IV  Last administered on 4/27/20at 

22:01;  Start 4/27/20 at 22:00;  Stop 4/28/20 at 21:59;  Status DC


Bumetanide (Bumex) 2 mg [id number] IV  Last administered on 5/1/20at 13:50;  Start 

4/28/20 at 14:00;  Stop 5/2/20 at 14:10;  Status DC


Meropenem 1 gm/ Sodium Chloride 100 ml @  200 mls/hr Q8HRS IV  Last administered

on 5/22/20at 05:53;  Start 4/28/20 at 14:00;  Stop 5/22/20 at 09:31;  Status DC


Potassium Acetate 55 meq/Magnesium Sulfate 20 meq/ Calcium Gluconate 10 meq/ 

Multivitamins 10 ml/Chromium/ Copper/Manganese/ Seleni/Zn 0.5 ml/ Insulin Human 

Regular 35 unit/ Total Parenteral Nutrition/Amino Acids/Dextrose/ Fat Emulsion 

Intravenous 1,920 ml @  80 mls/hr TPN  CONT IV  Last administered on 4/28/20at 

22:02;  Start 4/28/20 at 22:00;  Stop 4/29/20 at 21:59;  Status DC


Hydromorphone HCl (Dilaudid Standard PCA) 12 mg STK-MED ONCE IV ;  Start 4/27/20

at 14:35;  Stop 4/28/20 at 13:53;  Status DC


Artificial Tears (Artificial Tears) 1 drop PRN Q15MIN  PRN OU DRY EYE Last 

administered on 5/29/20at 10:08;  Start 4/29/20 at 05:30


Hydromorphone HCl (Dilaudid Standard PCA) 12 mg STK-MED ONCE IV ;  Start 4/28/20

at 12:05;  Stop 4/29/20 at 09:15;  Status DC


Potassium Acetate 65 meq/Magnesium Sulfate 20 meq/ Calcium Gluconate 10 meq/ 

Multivitamins 10 ml/Chromium/ Copper/Manganese/ Seleni/Zn 0.5 ml/ Insulin Human 

Regular 30 unit/ Total Parenteral Nutrition/Amino Acids/Dextrose/ Fat Emulsion 

Intravenous 1,920 ml @  80 mls/hr TPN  CONT IV  Last administered on 4/29/20at 

22:22;  Start 4/29/20 at 22:00;  Stop 4/30/20 at 21:59;  Status DC


Cyclobenzaprine HCl (Flexeril) 10 mg PRN Q6HRS  PRN PO MUSCLE SPASMS;  Start 

4/30/20 at 10:45


Potassium Acetate 55 meq/Magnesium Sulfate 20 meq/ Calcium Gluconate 10 meq/ 

Multivitamins 10 ml/Chromium/ Copper/Manganese/ Seleni/Zn 0.5 ml/ Insulin Human 

Regular 30 unit/ Total Parenteral Nutrition/Amino Acids/Dextrose/ Fat Emulsion 

Intravenous 1,920 ml @  80 mls/hr TPN  CONT IV  Last administered on 5/1/20at 

01:00;  Start 4/30/20 at 22:00;  Stop 5/1/20 at 21:59;  Status DC


Magnesium Sulfate 50 ml @ 25 mls/hr 1X  ONCE IV  Last administered on 4/30/20at 

17:18;  Start 4/30/20 at 12:45;  Stop 4/30/20 at 14:44;  Status DC


Potassium Chloride/Water 100 ml @  100 mls/hr 1X  ONCE IV  Last administered on 

5/1/20at 11:27;  Start 5/1/20 at 12:00;  Stop 5/1/20 at 12:59;  Status DC


Hydromorphone HCl (Dilaudid Standard PCA) 12 mg STK-MED ONCE IV ;  Start 4/29/20

at 10:50;  Stop 5/1/20 at 11:02;  Status DC


Hydromorphone HCl (Dilaudid Standard PCA) 12 mg STK-MED ONCE IV ;  Start 4/30/20

at 13:47;  Stop 5/1/20 at 11:03;  Status DC


Potassium Acetate 30 meq/Magnesium Sulfate 20 meq/ Calcium Gluconate 10 meq/ 

Multivitamins 10 ml/Chromium/ Copper/Manganese/ Seleni/Zn 0.5 ml/ Insulin Human 

Regular 30 unit/ Potassium Chloride 30 meq/ Total Parenteral Nutrition/Amino 

Acids/Dextrose/ Fat Emulsion Intravenous 1,920 ml @  80 mls/hr TPN  CONT IV  

Last administered on 5/1/20at 22:34;  Start 5/1/20 at 22:00;  Stop 5/2/20 at 

21:59;  Status DC


Potassium Chloride/Water 100 ml @  100 mls/hr Q1H IV  Last administered on 5/2 /20at 13:05;  Start 5/2/20 at 07:00;  Stop 5/2/20 at 10:59;  Status DC


Magnesium Sulfate 50 ml @ 25 mls/hr 1X  ONCE IV  Last administered on 5/2/20at 

10:34;  Start 5/2/20 at 10:30;  Stop 5/2/20 at 12:29;  Status DC


Potassium Chloride 75 meq/ Magnesium Sulfate 20 meq/Calcium Gluconate 10 meq/ 

Multivitamins 10 ml/Chromium/ Copper/Manganese/ Seleni/Zn 0.5 ml/ Insulin Human 

Regular 30 unit/ Total Parenteral Nutrition/Amino Acids/Dextrose/ Fat Emulsion 

Intravenous 1,920 ml @  80 mls/hr TPN  CONT IV  Last administered on 5/2/20at 

21:51;  Start 5/2/20 at 22:00;  Stop 5/3/20 at 22:00;  Status DC


Potassium Chloride 75 meq/ Magnesium Sulfate 20 meq/Calcium Gluconate 10 meq/ 

Multivitamins 10 ml/Chromium/ Copper/Manganese/ Seleni/Zn 0.5 ml/ Insulin Human 

Regular 25 unit/ Total Parenteral Nutrition/Amino Acids/Dextrose/ Fat Emulsion 

Intravenous 1,920 ml @  80 mls/hr TPN  CONT IV  Last administered on 5/3/20at 

22:04;  Start 5/3/20 at 22:00;  Stop 5/4/20 at 21:59;  Status DC


Hydromorphone HCl (Dilaudid) 0.4 mg PRN Q4HRS  PRN IVP PAIN Last administered on

5/4/20at 10:57;  Start 5/4/20 at 09:00;  Stop 5/4/20 at 18:59;  Status DC


Micafungin Sodium 100 mg/Dextrose 100 ml @  100 mls/hr Q24H IV  Last 

administered on 5/26/20at 12:17;  Start 5/4/20 at 11:00;  Stop 5/27/20 at 09:59;

 Status DC


Daptomycin 485 mg/ Sodium Chloride 50 ml @  100 mls/hr Q24H IV  Last 

administered on 5/11/20at 13:10;  Start 5/4/20 at 11:00;  Stop 5/12/20 at 07:44;

 Status DC


Potassium Chloride 75 meq/ Magnesium Sulfate 15 meq/Calcium Gluconate 8 meq/ 

Multivitamins 10 ml/Chromium/ Copper/Manganese/ Seleni/Zn 0.5 ml/ Insulin Human 

Regular 25 unit/ Total Parenteral Nutrition/Amino Acids/Dextrose/ Fat Emulsion 

Intravenous 1,920 ml @  80 mls/hr TPN  CONT IV  Last administered on 5/4/20at 

23:08;  Start 5/4/20 at 22:00;  Stop 5/5/20 at 21:59;  Status DC


Haloperidol Lactate (Haldol Inj) 3 mg 1X  ONCE IVP  Last administered on 

5/4/20at 14:37;  Start 5/4/20 at 14:30;  Stop 5/4/20 at 14:31;  Status DC


Hydromorphone HCl (Dilaudid) 1 mg PRN Q4HRS  PRN IVP PAIN Last administered on 

5/18/20at 06:25;  Start 5/4/20 at 19:00;  Stop 5/18/20 at 17:10;  Status DC


Potassium Chloride 75 meq/ Magnesium Sulfate 15 meq/Calcium Gluconate 8 meq/ 

Multivitamins 10 ml/Chromium/ Copper/Manganese/ Seleni/Zn 0.5 ml/ Insulin Human 

Regular 20 unit/ Total Parenteral Nutrition/Amino Acids/Dextrose/ Fat Emulsion 

Intravenous 1,920 ml @  80 mls/hr TPN  CONT IV  Last administered on 5/5/20at 

22:10;  Start 5/5/20 at 22:00;  Stop 5/6/20 at 21:59;  Status DC


Lidocaine HCl (Buffered Lidocaine 1%) 3 ml STK-MED ONCE .ROUTE ;  Start 5/6/20 

at 11:31;  Stop 5/6/20 at 11:31;  Status DC


Lidocaine HCl (Buffered Lidocaine 1%) 3 ml STK-MED ONCE .ROUTE ;  Start 5/6/20 

at 12:28;  Stop 5/6/20 at 12:29;  Status DC


Lidocaine HCl (Buffered Lidocaine 1%) 6 ml 1X  ONCE INJ  Last administered on 

5/6/20at 12:53;  Start 5/6/20 at 12:45;  Stop 5/6/20 at 12:46;  Status DC


Potassium Chloride 75 meq/ Magnesium Sulfate 15 meq/Calcium Gluconate 8 meq/ 

Multivitamins 10 ml/Chromium/ Copper/Manganese/ Seleni/Zn 0.5 ml/ Insulin Human 

Regular 20 unit/ Total Parenteral Nutrition/Amino Acids/Dextrose/ Fat Emulsion 

Intravenous 1,920 ml @  80 mls/hr TPN  CONT IV  Last administered on 5/6/20at 

22:00;  Start 5/6/20 at 22:00;  Stop 5/7/20 at 21:59;  Status DC


Potassium Chloride 75 meq/ Magnesium Sulfate 15 meq/Calcium Gluconate 8 meq/ 

Multivitamins 10 ml/Chromium/ Copper/Manganese/ Seleni/Zn 0.5 ml/ Insulin Human 

Regular 15 unit/ Total Parenteral Nutrition/Amino Acids/Dextrose/ Fat Emulsion 

Intravenous 1,920 ml @  80 mls/hr TPN  CONT IV  Last administered on 5/7/20at 

22:28;  Start 5/7/20 at 22:00;  Stop 5/8/20 at 21:59;  Status DC


Vecuronium Bromide (Norcuron Bolus) 6 mg PRN Q6HRS  PRN IV VENT ASYNCHRONY;  

Start 5/7/20 at 19:15;  Stop 5/7/20 at 19:35;  Status DC


Bumetanide (Bumex) 2 mg 1X  ONCE IV  Last administered on 5/7/20at 22:09;  Start

5/7/20 at 19:45;  Stop 5/7/20 at 19:46;  Status DC


Lidocaine HCl (Buffered Lidocaine 1%) 3 ml STK-MED ONCE .ROUTE ;  Start 5/8/20 

at 07:59;  Stop 5/8/20 at 07:59;  Status DC


Midazolam HCl (Versed) 5 mg STK-MED ONCE .ROUTE ;  Start 5/8/20 at 08:36;  Stop 

5/8/20 at 08:36;  Status DC


Fentanyl Citrate (Fentanyl 5ml Vial) 250 mcg STK-MED ONCE .ROUTE ;  Start 5/8/20

at 08:36;  Stop 5/8/20 at 08:37;  Status DC


Lidocaine HCl (Buffered Lidocaine 1%) 3 ml 1X  ONCE IJ  Last administered on 

5/8/20at 09:30;  Start 5/8/20 at 09:15;  Stop 5/8/20 at 09:16;  Status DC


Midazolam HCl (Versed) 5 mg 1X  ONCE IV  Last administered on 5/8/20at 09:30;  

Start 5/8/20 at 09:15;  Stop 5/8/20 at 09:16;  Status DC


Fentanyl Citrate (Fentanyl 5ml Vial) 250 mcg 1X  ONCE IV  Last administered on 

5/8/20at 09:30;  Start 5/8/20 at 09:15;  Stop 5/8/20 at 09:16;  Status DC


Bumetanide (Bumex) 2 mg DAILY IV  Last administered on 5/18/20at 08:07;  Start 

5/8/20 at 10:00;  Stop 5/18/20 at 17:15;  Status DC


Potassium Chloride 75 meq/ Magnesium Sulfate 15 meq/ Multivitamins 10 

ml/Chromium/ Copper/Manganese/ Seleni/Zn 0.5 ml/ Insulin Human Regular 15 unit/ 

Total Parenteral Nutrition/Amino Acids/Dextrose/ Fat Emulsion Intravenous 1,920 

ml @  80 mls/hr TPN  CONT IV  Last administered on 5/8/20at 21:59;  Start 5/8/20

at 22:00;  Stop 5/9/20 at 21:59;  Status DC


Metoclopramide HCl (Reglan Vial) 10 mg PRN Q3HRS  PRN IVP NAUSEA/VOMITING-3rd 

choice Last administered on 5/14/20at 04:25;  Start 5/9/20 at 16:45


Potassium Chloride 75 meq/ Magnesium Sulfate 15 meq/ Multivitamins 10 

ml/Chromium/ Copper/Manganese/ Seleni/Zn 0.5 ml/ Insulin Human Regular 15 unit/ 

Total Parenteral Nutrition/Amino Acids/Dextrose/ Fat Emulsion Intravenous 1,920 

ml @  80 mls/hr TPN  CONT IV  Last administered on 5/9/20at 22:41;  Start 5/9/20

at 22:00;  Stop 5/10/20 at 21:59;  Status DC


Magnesium Sulfate 50 ml @ 25 mls/hr 1X  ONCE IV  Last administered on 5/10/20at 

10:44;  Start 5/10/20 at 09:00;  Stop 5/10/20 at 10:59;  Status DC


Potassium Chloride/Water 100 ml @  100 mls/hr 1X  ONCE IV  Last administered on 

5/10/20at 09:37;  Start 5/10/20 at 09:00;  Stop 5/10/20 at 09:59;  Status DC


Duloxetine HCl (Cymbalta) 30 mg DAILY PO  Last administered on 5/11/20at 09:48; 

Start 5/10/20 at 14:00;  Stop 5/13/20 at 10:25;  Status DC


Potassium Chloride 80 meq/ Magnesium Sulfate 20 meq/ Multivitamins 10 

ml/Chromium/ Copper/Manganese/ Seleni/Zn 0.5 ml/ Insulin Human Regular 15 unit/ 

Total Parenteral Nutrition/Amino Acids/Dextrose/ Fat Emulsion Intravenous 1,920 

ml @  80 mls/hr TPN  CONT IV  Last administered on 5/10/20at 21:42;  Start 

5/10/20 at 22:00;  Stop 5/11/20 at 21:59;  Status DC


Potassium Chloride 80 meq/ Magnesium Sulfate 20 meq/ Multivitamins 10 

ml/Chromium/ Copper/Manganese/ Seleni/Zn 0.5 ml/ Insulin Human Regular 15 unit/ 

Total Parenteral Nutrition/Amino Acids/Dextrose/ Fat Emulsion Intravenous 1,920 

ml @  80 mls/hr TPN  CONT IV  Last administered on 5/11/20at 22:20;  Start 

5/11/20 at 22:00;  Stop 5/12/20 at 21:59;  Status DC


Lidocaine HCl (Buffered Lidocaine 1%) 3 ml STK-MED ONCE .ROUTE ;  Start 5/12/20 

at 09:54;  Stop 5/12/20 at 09:55;  Status DC


Hydromorphone HCl (Dilaudid Standard PCA) 12 mg STK-MED ONCE IV ;  Start 5/1/20 

at 15:50;  Stop 5/12/20 at 11:24;  Status DC


Potassium Chloride 80 meq/ Magnesium Sulfate 20 meq/ Multivitamins 10 

ml/Chromium/ Copper/Manganese/ Seleni/Zn 0.5 ml/ Insulin Human Regular 15 unit/ 

Total Parenteral Nutrition/Amino Acids/Dextrose/ Fat Emulsion Intravenous 1,920 

ml @  80 mls/hr TPN  CONT IV  Last administered on 5/12/20at 21:40;  Start 

5/12/20 at 22:00;  Stop 5/13/20 at 21:59;  Status DC


Lidocaine HCl (Buffered Lidocaine 1%) 6 ml 1X  ONCE INJ  Last administered on 

5/12/20at 14:15;  Start 5/12/20 at 14:15;  Stop 5/12/20 at 14:16;  Status DC


Potassium Chloride 80 meq/ Magnesium Sulfate 20 meq/ Multivitamins 10 

ml/Chromium/ Copper/Manganese/ Seleni/Zn 1 ml/ Insulin Human Regular 15 unit/ 

Total Parenteral Nutrition/Amino Acids/Dextrose/ Fat Emulsion Intravenous 1,920 

ml @  80 mls/hr TPN  CONT IV  Last administered on 5/13/20at 22:04;  Start 

5/13/20 at 22:00;  Stop 5/14/20 at 21:59;  Status DC


Potassium Chloride/Water 100 ml @  100 mls/hr 1X  ONCE IV  Last administered on 

5/14/20at 11:34;  Start 5/14/20 at 11:00;  Stop 5/14/20 at 11:59;  Status DC


Potassium Chloride 90 meq/ Magnesium Sulfate 20 meq/ Multivitamins 10 

ml/Chromium/ Copper/Manganese/ Seleni/Zn 1 ml/ Insulin Human Regular 15 unit/ 

Total Parenteral Nutrition/Amino Acids/Dextrose/ Fat Emulsion Intravenous 1,920 

ml @  80 mls/hr TPN  CONT IV  Last administered on 5/14/20at 22:57;  Start 

5/14/20 at 22:00;  Stop 5/15/20 at 21:59;  Status DC


Potassium Chloride 90 meq/ Magnesium Sulfate 20 meq/ Multivitamins 10 

ml/Chromium/ Copper/Manganese/ Seleni/Zn 1 ml/ Insulin Human Regular 15 unit/ 

Total Parenteral Nutrition/Amino Acids/Dextrose/ Fat Emulsion Intravenous 1,920 

ml @  80 mls/hr TPN  CONT IV  Last administered on 5/15/20at 22:48;  Start 

5/15/20 at 22:00;  Stop 5/16/20 at 21:59;  Status DC


Potassium Chloride 90 meq/ Magnesium Sulfate 20 meq/ Multivitamins 10 

ml/Chromium/ Copper/Manganese/ Seleni/Zn 1 ml/ Insulin Human Regular 15 unit/ 

Total Parenteral Nutrition/Amino Acids/Dextrose/ Fat Emulsion Intravenous 1,890 

ml @  78.75 mls/ hr TPN  CONT IV  Last administered on 5/16/20at 22:15;  Start 

5/16/20 at 22:00;  Stop 5/17/20 at 21:59;  Status DC


Linezolid/Dextrose 300 ml @  300 mls/hr Q12HR IV  Last administered on 5/19/20at

21:08;  Start 5/17/20 at 09:00;  Stop 5/20/20 at 08:11;  Status DC


Daptomycin 450 mg/ Sodium Chloride 50 ml @  100 mls/hr Q24H IV  Last 

administered on 5/20/20at 09:25;  Start 5/17/20 at 09:00;  Stop 5/21/20 at 

08:30;  Status DC


Potassium Chloride 90 meq/ Magnesium Sulfate 20 meq/ Multivitamins 10 

ml/Chromium/ Copper/Manganese/ Seleni/Zn 1 ml/ Insulin Human Regular 15 unit/ 

Total Parenteral Nutrition/Amino Acids/Dextrose/ Fat Emulsion Intravenous 1,890 

ml @  78.75 mls/ hr TPN  CONT IV  Last administered on 5/17/20at 21:34;  Start 

5/17/20 at 22:00;  Stop 5/18/20 at 21:59;  Status DC


Lorazepam (Ativan Inj) 2 mg STK-MED ONCE .ROUTE ;  Start 5/17/20 at 14:58;  Stop

5/17/20 at 14:58;  Status DC


Metoprolol Tartrate (Lopressor Vial) 5 mg 1X  ONCE IVP  Last administered on 

5/17/20at 15:31;  Start 5/17/20 at 15:15;  Stop 5/17/20 at 15:16;  Status DC


Lorazepam (Ativan Inj) 2 mg 1X  ONCE IVP  Last administered on 5/17/20at 15:30; 

Start 5/17/20 at 15:15;  Stop 5/17/20 at 15:16;  Status DC


Enoxaparin Sodium (Lovenox 40mg Syringe) 40 mg Q24H SQ  Last administered on 

5/31/20at 17:25;  Start 5/17/20 at 17:00


Lorazepam (Ativan Inj) 1 mg PRN Q4HRS  PRN IVP ANXIETY / AGITATION MILD-MOD Last

administered on 5/31/20at 15:55;  Start 5/17/20 at 19:15


Lorazepam (Ativan Inj) 2 mg PRN Q4HRS  PRN IVP ANXIETY / AGITATION SEVERE Last 

administered on 6/1/20at 07:55;  Start 5/17/20 at 19:15


Fentanyl Citrate (Fentanyl 2ml Vial) 50 mcg PRN Q4HRS  PRN IVP SEVERE PAIN Last 

administered on 5/31/20at 15:55;  Start 5/18/20 at 13:15


Fentanyl Citrate (Fentanyl 2ml Vial) 25 mcg PRN Q4HRS  PRN IVP MODERATE PAIN 

Last administered on 6/1/20at 01:14;  Start 5/18/20 at 13:15


Potassium Chloride 90 meq/ Magnesium Sulfate 20 meq/ Multivitamins 10 

ml/Chromium/ Copper/Manganese/ Seleni/Zn 1 ml/ Insulin Human Regular 15 unit/ 

Total Parenteral Nutrition/Amino Acids/Dextrose/ Fat Emulsion Intravenous 1,890 

ml @  78.75 mls/ hr TPN  CONT IV  Last administered on 5/18/20at 22:18;  Start 

5/18/20 at 22:00;  Stop 5/19/20 at 21:59;  Status DC


Furosemide (Lasix) 40 mg 1X  ONCE IVP  Last administered on 5/18/20at 21:51;  

Start 5/18/20 at 21:45;  Stop 5/18/20 at 21:48;  Status DC


Albumin Human 100 ml @  100 mls/hr 1X PRN  PRN IV SEE COMMENTS;  Start 5/19/20 

at 01:30


Furosemide (Lasix) 40 mg [id number] IVP  Last administered on 6/1/20at 07:56;  Start 

5/19/20 at 14:00


Potassium Chloride 90 meq/ Magnesium Sulfate 20 meq/ Multivitamins 10 

ml/Chromium/ Copper/Manganese/ Seleni/Zn 1 ml/ Insulin Human Regular 15 unit/ 

Total Parenteral Nutrition/Amino Acids/Dextrose/ Fat Emulsion Intravenous 1,800 

ml @  75 mls/hr TPN  CONT IV  Last administered on 5/19/20at 22:31;  Start 

5/19/20 at 22:00;  Stop 5/20/20 at 21:59;  Status DC


Potassium Chloride 90 meq/ Magnesium Sulfate 20 meq/ Multivitamins 10 

ml/Chromium/ Copper/Manganese/ Seleni/Zn 1 ml/ Insulin Human Regular 15 unit/ 

Total Parenteral Nutrition/Amino Acids/Dextrose/ Fat Emulsion Intravenous 1,800 

ml @  75 mls/hr TPN  CONT IV  Last administered on 5/20/20at 22:28;  Start 

5/20/20 at 22:00;  Stop 5/21/20 at 21:59;  Status DC


Potassium Chloride 110 meq/ Magnesium Sulfate 20 meq/ Multivitamins 10 

ml/Chromium/ Copper/Manganese/ Seleni/Zn 1 ml/ Insulin Human Regular 15 unit/ 

Total Parenteral Nutrition/Amino Acids/Dextrose/ Fat Emulsion Intravenous 1,800 

ml @  75 mls/hr TPN  CONT IV  Last administered on 5/21/20at 22:01;  Start 

5/21/20 at 22:00;  Stop 5/22/20 at 21:59;  Status DC


Saliva Substitute (Biotene Moisturizing Mouth) 2 spray PRN Q15MIN  PRN PO DRY 

MOUTH;  Start 5/21/20 at 11:00


Potassium Chloride 110 meq/ Magnesium Sulfate 20 meq/ Multivitamins 10 

ml/Chromium/ Copper/Manganese/ Seleni/Zn 1 ml/ Insulin Human Regular 15 unit/ 

Total Parenteral Nutrition/Amino Acids/Dextrose/ Fat Emulsion Intravenous 1,800 

ml @  75 mls/hr TPN  CONT IV  Last administered on 5/22/20at 22:21;  Start 

5/22/20 at 22:00;  Stop 5/23/20 at 21:59;  Status DC


Potassium Chloride 110 meq/ Magnesium Sulfate 20 meq/ Multivitamins 10 

ml/Chromium/ Copper/Manganese/ Seleni/Zn 1 ml/ Insulin Human Regular 15 unit/ 

Total Parenteral Nutrition/Amino Acids/Dextrose/ Fat Emulsion Intravenous 1,800 

ml @  75 mls/hr TPN  CONT IV  Last administered on 5/23/20at 22:04;  Start 

5/23/20 at 22:00;  Stop 5/24/20 at 21:59;  Status DC


Potassium Chloride 110 meq/ Magnesium Sulfate 20 meq/ Multivitamins 10 

ml/Chromium/ Copper/Manganese/ Seleni/Zn 1 ml/ Insulin Human Regular 15 unit/ 

Total Parenteral Nutrition/Amino Acids/Dextrose/ Fat Emulsion Intravenous 1,800 

ml @  75 mls/hr TPN  CONT IV  Last administered on 5/24/20at 22:48;  Start 

5/24/20 at 22:00;  Stop 5/25/20 at 21:59;  Status DC


Potassium Chloride 70 meq/ Magnesium Sulfate 20 meq/ Multivitamins 10 

ml/Chromium/ Copper/Manganese/ Seleni/Zn 1 ml/ Insulin Human Regular 15 unit/ 

Total Parenteral Nutrition/Amino Acids/Dextrose/ Fat Emulsion Intravenous 1,800 

ml @  75 mls/hr TPN  CONT IV  Last administered on 5/25/20at 21:39;  Start 

5/25/20 at 22:00;  Stop 5/26/20 at 21:59;  Status DC


Meropenem 500 mg/ Sodium Chloride 50 ml @  100 mls/hr Q6HRS IV  Last 

administered on 5/27/20at 06:02;  Start 5/25/20 at 18:00;  Stop 5/27/20 at 

09:59;  Status DC


Barium Sulfate (Varibar Thin Liquid Apple) 148 gm 1X  ONCE PO ;  Start 5/26/20 

at 11:45;  Stop 5/26/20 at 11:49;  Status DC


Potassium Chloride 70 meq/ Magnesium Sulfate 20 meq/ Multivitamins 10 

ml/Chromium/ Copper/Manganese/ Seleni/Zn 1 ml/ Insulin Human Regular 15 unit/ 

Total Parenteral Nutrition/Amino Acids/Dextrose/ Fat Emulsion Intravenous 1,800 

ml @  75 mls/hr TPN  CONT IV  Last administered on 5/26/20at 22:27;  Start 

5/26/20 at 22:00;  Stop 5/27/20 at 21:59;  Status DC


Piperacillin Sod/ Tazobactam Sod 3.375 gm/Sodium Chloride 50 ml @  100 mls/hr 

Q6HRS IV  Last administered on 6/1/20at 05:34;  Start 5/27/20 at 12:00


Potassium Chloride 70 meq/ Magnesium Sulfate 20 meq/ Multivitamins 10 

ml/Chromium/ Copper/Manganese/ Seleni/Zn 1 ml/ Insulin Human Regular 15 unit/ 

Total Parenteral Nutrition/Amino Acids/Dextrose/ Fat Emulsion Intravenous 1,800 

ml @  75 mls/hr TPN  CONT IV  Last administered on 5/27/20at 22:03;  Start 

5/27/20 at 22:00;  Stop 5/28/20 at 21:59;  Status DC


Potassium Chloride 70 meq/ Magnesium Sulfate 20 meq/ Multivitamins 10 

ml/Chromium/ Copper/Manganese/ Seleni/Zn 1 ml/ Insulin Human Regular 15 unit/ 

Total Parenteral Nutrition/Amino Acids/Dextrose/ Fat Emulsion Intravenous 1,800 

ml @  75 mls/hr TPN  CONT IV  Last administered on 5/28/20at 22:33;  Start 

5/28/20 at 22:00;  Stop 5/29/20 at 21:59;  Status DC


Potassium Chloride 70 meq/ Magnesium Sulfate 20 meq/ Multivitamins 10 

ml/Chromium/ Copper/Manganese/ Seleni/Zn 1 ml/ Insulin Human Regular 15 unit/ 

Total Parenteral Nutrition/Amino Acids/Dextrose/ Fat Emulsion Intravenous 1,800 

ml @  75 mls/hr TPN  CONT IV  Last administered on 5/29/20at 23:13;  Start 

5/29/20 at 22:00;  Stop 5/30/20 at 21:59;  Status DC


Potassium Chloride 80 meq/ Magnesium Sulfate 20 meq/ Multivitamins 10 

ml/Chromium/ Copper/Manganese/ Seleni/Zn 1 ml/ Insulin Human Regular 15 unit/ 

Total Parenteral Nutrition/Amino Acids/Dextrose/ Fat Emulsion Intravenous 1,800 

ml @  75 mls/hr TPN  CONT IV  Last administered on 5/30/20at 22:30;  Start 

5/30/20 at 22:00;  Stop 5/31/20 at 21:59;  Status DC


Potassium Chloride 80 meq/ Magnesium Sulfate 20 meq/ Multivitamins 10 

ml/Chromium/ Copper/Manganese/ Seleni/Zn 1 ml/ Insulin Human Regular 15 unit/ 

Total Parenteral Nutrition/Amino Acids/Dextrose/ Fat Emulsion Intravenous 1,800 

ml @  75 mls/hr TPN  CONT IV  Last administered on 5/31/20at 21:54;  Start 

5/31/20 at 22:00;  Stop 6/1/20 at 21:59


Potassium Chloride/Water 100 ml @  100 mls/hr 1X  ONCE IV  Last administered on 

6/1/20at 10:15;  Start 6/1/20 at 10:00;  Stop 6/1/20 at 10:59





Active Scripts


Active


Reported


Bisoprolol Fumarate 5 Mg Tablet 10 Mg PO DAILY


Vitals/I & O





Vital Sign - Last 24 Hours








 5/31/20 5/31/20 5/31/20 5/31/20





 10:54 11:00 14:36 15:00


 


Temp  99.1  98.0





  99.1  98.0


 


Pulse  136  141


 


Resp  43  45


 


B/P (MAP)  140/88 (105)  127/72 (90)


 


Pulse Ox 95 95 95 92


 


O2 Delivery Room Air Room Air Room Air Room Air


 


    





    





 5/31/20 5/31/20 5/31/20 5/31/20





 15:55 16:49 19:00 20:00


 


Temp   98.3 





   98.3 


 


Pulse   140 


 


Resp 33 32 35 


 


B/P (MAP)   138/78 (98) 


 


Pulse Ox 95 95 96 


 


O2 Delivery Room Air Room Air Tracheal Collar Trach Collar


 


O2 Flow Rate 8.0 8.0 8.0 8.0


 


    





    





 5/31/20 6/1/20 6/1/20 6/1/20





 23:02 01:14 01:44 03:00


 


Temp 99.3   98.3





 99.3   98.3


 


Pulse 138   131


 


Resp 41 48 42 48


 


B/P (MAP) 139/92 (108)   128/82 (97)


 


Pulse Ox 95   95


 


O2 Delivery Room Air Room Air Room Air Room Air


 


    





    





 6/1/20 6/1/20 6/1/20 





 07:00 08:00 08:10 


 


Temp 99.4   





 99.4   


 


Pulse 138   


 


Resp 32  48 


 


B/P (MAP) 184/99 (127)  160/79 (106) 


 


Pulse Ox 91  100 


 


O2 Delivery Tracheal Collar Trach Collar Tracheal Collar 


 


O2 Flow Rate  10.0  














Intake and Output   


 


 5/31/20 5/31/20 6/1/20





 15:00 23:00 07:00


 


Intake Total  923 ml 0 ml


 


Output Total 550 ml 250 ml 650 ml


 


Balance -550 ml 673 ml -650 ml

















CLEMENTINA PANTOJA MD            Jun 1, 2020 10:21

## 2020-06-01 NOTE — PDOC
[facility] PROGRESS NOTE


Chief Complaint


Chief Complaint


Acute hypoxic Respiratory failure requiring mechanical ventilation (now 

extubated for several days but still with tracheostomy)


Tracheostomy


bilateral pleural effusions/pulm edema 


Sepsis


Severe Acute gallstone pancreatitis (not a surgical candidate at this time) with

necrosis


Acute kidney failure now requiring dialysis


Salpingitis


Gallstones (Calculus of gallbladder with acute cholecystitis without obstruc

tion)


HTN 


Leukocytosis 


Hypoxia


Uterine fibroid


Intractable pain


Intractable nausea


Covid 19 negative. 


Acute on chronic anemia 


EEG: No seizure activityFever  - better currently - intermittent could be from 

underlying pancreatitis blood cults 5/4 - neg so far


? Ileus with vomiting


Abd distention - U/S and CT reviewed s/p 0.4 L of opaque, debris-containing 

ascites was removed 5/6


Acute pancreatitis with persistent necrosis


- 4/27 status post ROBERT drain placement + C paropsilosis. s/p additional drains 

5/8


Anemia - S/p PRBCs


Cholelithiasis with thickening of the gallbladder wall.


Leucocytosis improving


JED, hyperkalemia, Metabolic acidosis off dialysis


Acute hypoxic resp failure ,bilateral pleural effusion and atelectasis


hypocalcemia 


Prediabetes


HTN


s/p trach


ESRD on HD


Hyperglycemia





History of Present Illness


History of Present Illness


6/1/2020


Patient seen and examined in the ICU


She appears extremely ill


She is tachypneic at 35 respirations per minute and tachycardic at 132 bpm


She is extremely encephalopathic and shaky


She appears clammy


Chart reviewed


Discussed with RN


Prognosis extremely guarded at best








5/31/2020


Patient still in ICU


Resting with no apparent distress


Chart reviewed








5/30/2020


Patient seen and examined in the ICU


She is wiping her face with a cough


Discussed with RN


Chart reviewed


We hope to get her out of the ICU later today if possible








5/29/2020


Patient seen and examined in the ICU once again


She is back on NG suction


On IV Zosyn


Has IV TPN


Sedated with Precedex but anxious still


Appears somewhat clammy and pale


Chart reviewed


Discussed with RN


She remains critically ill














5/28/2020


Patient seen and examined in the ICU


She has an NG that is clamped we are hoping to start some clear liquids but she 

looks quite ill


She is on IV TPN


Meropenem changed to IV Zosyn (agree)


She has [place] to bedside drainage


She is semi-sedated with Precedex


Chart reviewed


Discussed with RN


She remains critically ill











Seen bedside. Hb 8. She was just a bit hypoxic, had a mucous plug suctioned. 

Able to vocalize well with speaking valve, tells me we are being very hard on 

her and she would like to be drugged back to sleep.  She wants to wake up and 

feel better. On trach shield, 


T max 100.4. afebrile this a.m.





5/26/2020


Patient seen and examined in the ICU


She is up in the chair very frail trying to talk a little shaky


Discussed with RN


Chart reviewed








5/25/2020


Patient seen and examined in the ICU


Patient up in the chair


Having a severe coughing episode with a lot of phlegm coming out of her 

tracheostomy


Discussed with RN


Discussed with physical therapy


Chart reviewed











5/24,.    feels well,  has been out of room in wheelchair


no complaint,    still weak,   some with not wanting to wear her valve on trach


cont other,   may be able to work with speech tomorrow,    kaliwallow OK to 

try, 








5/23,  anxiety is up today,   she dislikes the valve still,    


shower and outside today


.   





5/22 she doesnt want to wear her passy-kristan valve,  discussed str and plan with 

her.


speech following,  needs swallow study,   but needs to wear her valve longer,  


cont current


able to walk some, walker 





5/21  stronger,   better,   


we discussed better oral care


she would like to try swallow study,  wants to try to eat,    speech is 

following








5/20/2020 


She remains in the ICU


sitting up and working with OT,   getting better


if limit pain meds, may do better off the vent, 





Nurses trying to suction her,  that is also improved, 


Chart reviewed


 








5/19/2020


Patient seen and examined in the ICU


She had an episode yesterday of tachycardia and severe agitation we gave her 

some Ativan


After that she seemed to have stroke symptoms but now that the Ativan has wore 

off her stroke symptoms have resolved


 


 


She is on IV meropenem and daptomycin and micafungin


Chart reviewed


Discussed with RN


Patient is still critically ill


 


BRIEF OPERATIVE NOTE


Pre-Op Diagnosis


Pancreatitis with pseudocysts, suspected infection


Post-Op Diagnosis


same


Procedure Performed


CT abdominal Drains x 3


Surgeon


Hardy


Anesthesia Type:  Conscious Sedation


Findings


3 abdominal drains, 14F, with turbid pancreatic fluid and necrotic debris in 

each.


Complications


No immediate








5/9: Patient today somewhat restless and having bilious secretions from ET tube,

imaging studies ordered, discussed with consultant. Pretty poor prognosis, 

hopefully is not a fistula, poor surgical candidate. 





5/10: Imaging with no acute events, she seems more stable today compared to 

yesterday. Encouraged as much activity as possible patient at high risk for 

severe depression.





Vitals/I&O


Vitals/I&O:





                                   Vital Signs








  Date Time  Temp Pulse Resp B/P (MAP) Pulse Ox O2 Delivery O2 Flow Rate FiO2


 


6/1/20 11:00 99.4 135 32 155/97 (116) 95 Tracheal Collar 10.0 





 99.4       














                                    I & O   


 


 5/31/20 5/31/20 6/1/20





 15:00 23:00 07:00


 


Intake Total  923 ml 0 ml


 


Output Total 550 ml 250 ml 650 ml


 


Balance -550 ml 673 ml -650 ml











Physical Exam


Physical Exam:


GENERAL: Propped up in bed,, weak appearing see above


HEENT:  Oral cavity clear,  NGT


NECK:  Trach present


LUNGS: Clear, nonlabored 


HEART:  S1, S2, regular 


ABDOMEN: mod distention, bowel sounds present, + ROBERT drains x 3


: [place] (4/14)


EXTREMITIES: Generalized edema. no cyanosis


SKIN: Cool clammy and pale


CNS:Opens eyes to voice, very weak 


LUE-PICC (5/29) without signsof complications


General:  Alert, Cooperative, No acute distress


Heart:  Regular rate, Normal S1, Normal S2, No murmurs, Gallops


Lungs:  Other (Good air movement but having a lot of productive sputum from her 

tracheostomy site)


Abdomen:  Soft


Extremities:  No clubbing, No cyanosis, No edema, Normal pulses, No 

tenderness/swelling


Skin:  Other (warm, dry)





Labs


Labs:





Laboratory Tests








Test


 5/31/20


12:23 5/31/20


17:24 6/1/20


00:04 6/1/20


05:30


 


Glucose (Fingerstick)


 225 mg/dL


(70-99) 244 mg/dL


(70-99) 175 mg/dL


(70-99) 





 


White Blood Count


 


 


 


 12.6 x10^3/uL


(4.0-11.0)


 


Red Blood Count


 


 


 


 3.07 x10^6/uL


(3.50-5.40)


 


Hemoglobin


 


 


 


 8.4 g/dL


(12.0-15.5)


 


Hematocrit


 


 


 


 26.7 %


(36.0-47.0)


 


Mean Corpuscular Volume    87 fL () 


 


Mean Corpuscular Hemoglobin    27 pg (25-35) 


 


Mean Corpuscular Hemoglobin


Concent 


 


 


 31 g/dL


(31-37)


 


Red Cell Distribution Width


 


 


 


 19.3 %


(11.5-14.5)


 


Platelet Count


 


 


 


 554 x10^3/uL


(140-400)


 


Neutrophils (%) (Auto)    68 % (31-73) 


 


Lymphocytes (%) (Auto)    20 % (24-48) 


 


Monocytes (%) (Auto)    10 % (0-9) 


 


Eosinophils (%) (Auto)    1 % (0-3) 


 


Basophils (%) (Auto)    0 % (0-3) 


 


Neutrophils # (Auto)


 


 


 


 8.6 x10^3/uL


(1.8-7.7)


 


Lymphocytes # (Auto)


 


 


 


 2.6 x10^3/uL


(1.0-4.8)


 


Monocytes # (Auto)


 


 


 


 1.3 x10^3/uL


(0.0-1.1)


 


Eosinophils # (Auto)


 


 


 


 0.1 x10^3/uL


(0.0-0.7)


 


Basophils # (Auto)


 


 


 


 0.0 x10^3/uL


(0.0-0.2)


 


Sodium Level


 


 


 


 145 mmol/L


(136-145)


 


Potassium Level


 


 


 


 3.2 mmol/L


(3.5-5.1)


 


Chloride Level


 


 


 


 106 mmol/L


()


 


Carbon Dioxide Level


 


 


 


 29 mmol/L


(21-32)


 


Anion Gap    10 (6-14) 


 


Blood Urea Nitrogen


 


 


 


 34 mg/dL


(7-20)


 


Creatinine


 


 


 


 1.1 mg/dL


(0.6-1.0)


 


Estimated GFR


(Cockcroft-Gault) 


 


 


 52.8 





 


Glucose Level


 


 


 


 188 mg/dL


(70-99)


 


Calcium Level


 


 


 


 9.9 mg/dL


(8.5-10.1)


 


Phosphorus Level


 


 


 


 3.6 mg/dL


(2.6-4.7)


 


Test


 6/1/20


05:48 


 


 





 


Glucose (Fingerstick)


 189 mg/dL


(70-99) 


 


 














Review of Systems


Review of Systems:


Unable to obtain she is too weak to talk this morning





Assessment and Plan


Assessmemt and Plan


Problems


Medical Problems:


(1) Acute pancreatitis


Status: Acute  





(2) Cholelithiasis


Status: Acute  





Acute hypoxic Respiratory failure requiring mechanical ventilation was initially

intubated on 3/23 was off for couple of days now back on


Severe gallstone pancreatitis (not a surgical candidate at this time) with 

necrosis


Tracheostomy


bilateral pleural effusions/pulm edema 


Severe sepsis


Acute kidney failure now requiring dialysis


Salpingitis


Gallstones (Calculus of gallbladder with acute cholecystitis without 

obstruction)


HTN 


Leukocytosis 


Hypoxia


Uterine fibroid


Intractable pain


Intractable nausea


Covid 19 negative. 


Acute on chronic anemia 


EEG: No seizure activityFever  - better currently - intermittent could be from 

underlying pancreatitis blood cults 5/4 - neg so far


? Ileus with vomiting


Abd distention - U/S and CT reviewed s/p 0.4 L of opaque, debris-containing 

ascites was removed 5/6


Acute pancreatitis with persistent necrosis


- 4/27 status post ROBERT drain placement + C paropsilosis. s/p additional drains 

5/8


Anemia - S/p PRBCs


Cholelithiasis with thickening of the gallbladder wall.


Leucocytosis improving


JED, hyperkalemia, Metabolic acidosis off dialysis


Acute hypoxic resp failure ,bilateral pleural effusion and atelectasis


hypocalcemia 


Prediabetes


HTN


s/p trach


ESRD on HD


Hyperglycemia








Plan


ICU monitoring


Wound care


Hold off on Ativan for now as she gets too weak with it


Humidified O2 via nasal cannula for now but we can use trach shield as backup


NG suctioning


Nebulizers


Continue IV antibiotics and micafungin


Sedation with Precedex PRN


TPN protocol


Continue [place] to bedside drainage


Tracheostomy care


Hope to eventually move towards decannulation (we have a speaking valve for now)


Trend labs


Appreciate subspecialist input


I am still very concerned about her long-term prognosis ! (she scored a 9 on 

[place] criteria 4 weeks ago).


She is critically ill


Total time 32





Comment


Review of Relevant


I have reviewed the following items josy (where applicable) has been applied.


Medications:





Current Medications








 Medications


  (Trade)  Dose


 Ordered  Sig/Yee


 Route


 PRN Reason  Start Time


 Stop Time Status Last Admin


Dose Admin


 


 Potassium


 Chloride 80 meq/


 Magnesium Sulfate


 20 meq/


 Multivitamins 10


 ml/Chromium/


 Copper/Manganese/


 Seleni/Zn 1 ml/


 Insulin Human


 Regular 15 unit/


 Total Parenteral


 Nutrition/Amino


 Acids/Dextrose/


 Fat Emulsion


 Intravenous  1,800 ml @ 


 75 mls/hr  TPN  CONT


 IV


   5/31/20 22:00


 6/1/20 21:59  5/31/20 21:54





 


 Potassium


 Chloride/Water  100 ml @ 


 100 mls/hr  1X  ONCE


 IV


   6/1/20 10:00


 6/1/20 10:59 DC 6/1/20 10:15














Justicifation of Admission Dx:


Justifications for Admission:


Justification of Admission Dx:  Yes











HECTOR MASON III DO            Jun 1, 2020 11:26

## 2020-06-01 NOTE — NUR
Pharmacy TPN Dosing Note



S: JESENIA BEAN is a 49 year old F Currently receiving Central Continuous TPN started 
03/18/20



B:Pertinent PMH: Necrotizing pancreatitis

Height: 5 feet, 8 inches

Weight: 71.3 kg



Current diet: NPO 



LABS:

Sodium:    145 

Potassium: 3.2 

Chloride:  106 

Calcium:   9.9 

Corrected Calcium: 11.50 

Magnesium: 2.2 

CO2:       29 

SCr:       1.1 

Glucose:   175-197 

Albumin:   2.0 

AST:       15 

ALT:       14 



TPN FORMULA:

TPN TYPE:  Central Continuous

AMINO ACIDS:         75 gm

DEXTROSE:            250 gm

LIPIDS:              40 gm

POTASSIUM CHLORIDE:  90 mEq

MAGNESIUM:           20 mEq

INSULIN:             20 units

MULTIPLE VITAMIN:    10 ml

TRACE ELEMENTS:      1 ml(s)



TPN PLAN:  

K replaced with KCL 20 meq IVPB x 1 dose. KCl increased in TPN to 90 meq.

BG elevated- increased insulin in TPN to 20 units.

-BMP in AM.





R: Change TPN as noted above.

Will monitor electrolytes, glucose, and tolerance to TPN.



 LORENZO LESLIE [facility], 06/01/20 [id number]

## 2020-06-01 NOTE — PDOC
PULMONARY PROGRESS NOTES


Subjective


Patient intubated on 3/23 , s/p trach 4/6, has cough


Remains on TS, increase secretions


Vitals





Vital Signs








  Date Time  Temp Pulse Resp B/P (MAP) Pulse Ox O2 Delivery O2 Flow Rate FiO2


 


6/1/20 11:00 99.4 135 32 155/97 (116) 95 Tracheal Collar 10.0 





 99.4       








ROS:  No Chest Pain, No Abdominal Pain, No Increase Cough


General:  Alert, No acute distress


HEENT:  Other (trach midline )


Lungs:  Other (Good air movement but having a lot of productive sputum from her 

tracheostomy site)


Cardiovascular:  S1, S2


Abdomen:  Soft, Non-tender


Neuro Exam:  Alert


Extremities:  Other (+3 generalized edema )


Skin:  Warm, Dry


Labs





Laboratory Tests








Test


 5/30/20


12:06 5/30/20


16:27 5/30/20


17:57 5/31/20


00:04


 


Glucose (Fingerstick)


 206 mg/dL


(70-99) 88 mg/dL


(70-99) 112 mg/dL


(70-99) 189 mg/dL


(70-99)


 


Test


 5/31/20


06:04 5/31/20


12:23 5/31/20


17:24 6/1/20


00:04


 


Glucose (Fingerstick)


 219 mg/dL


(70-99) 225 mg/dL


(70-99) 244 mg/dL


(70-99) 175 mg/dL


(70-99)


 


Test


 6/1/20


05:30 6/1/20


05:48 


 





 


White Blood Count


 12.6 x10^3/uL


(4.0-11.0) 


 


 





 


Red Blood Count


 3.07 x10^6/uL


(3.50-5.40) 


 


 





 


Hemoglobin


 8.4 g/dL


(12.0-15.5) 


 


 





 


Hematocrit


 26.7 %


(36.0-47.0) 


 


 





 


Mean Corpuscular Volume 87 fL ()    


 


Mean Corpuscular Hemoglobin 27 pg (25-35)    


 


Mean Corpuscular Hemoglobin


Concent 31 g/dL


(31-37) 


 


 





 


Red Cell Distribution Width


 19.3 %


(11.5-14.5) 


 


 





 


Platelet Count


 554 x10^3/uL


(140-400) 


 


 





 


Neutrophils (%) (Auto) 68 % (31-73)    


 


Lymphocytes (%) (Auto) 20 % (24-48)    


 


Monocytes (%) (Auto) 10 % (0-9)    


 


Eosinophils (%) (Auto) 1 % (0-3)    


 


Basophils (%) (Auto) 0 % (0-3)    


 


Neutrophils # (Auto)


 8.6 x10^3/uL


(1.8-7.7) 


 


 





 


Lymphocytes # (Auto)


 2.6 x10^3/uL


(1.0-4.8) 


 


 





 


Monocytes # (Auto)


 1.3 x10^3/uL


(0.0-1.1) 


 


 





 


Eosinophils # (Auto)


 0.1 x10^3/uL


(0.0-0.7) 


 


 





 


Basophils # (Auto)


 0.0 x10^3/uL


(0.0-0.2) 


 


 





 


Sodium Level


 145 mmol/L


(136-145) 


 


 





 


Potassium Level


 3.2 mmol/L


(3.5-5.1) 


 


 





 


Chloride Level


 106 mmol/L


() 


 


 





 


Carbon Dioxide Level


 29 mmol/L


(21-32) 


 


 





 


Anion Gap 10 (6-14)    


 


Blood Urea Nitrogen


 34 mg/dL


(7-20) 


 


 





 


Creatinine


 1.1 mg/dL


(0.6-1.0) 


 


 





 


Estimated GFR


(Cockcroft-Gault) 52.8 


 


 


 





 


Glucose Level


 188 mg/dL


(70-99) 


 


 





 


Calcium Level


 9.9 mg/dL


(8.5-10.1) 


 


 





 


Phosphorus Level


 3.6 mg/dL


(2.6-4.7) 


 


 





 


Glucose (Fingerstick)


 


 189 mg/dL


(70-99) 


 











Laboratory Tests








Test


 5/31/20


12:23 5/31/20


17:24 6/1/20


00:04 6/1/20


05:30


 


Glucose (Fingerstick)


 225 mg/dL


(70-99) 244 mg/dL


(70-99) 175 mg/dL


(70-99) 





 


White Blood Count


 


 


 


 12.6 x10^3/uL


(4.0-11.0)


 


Red Blood Count


 


 


 


 3.07 x10^6/uL


(3.50-5.40)


 


Hemoglobin


 


 


 


 8.4 g/dL


(12.0-15.5)


 


Hematocrit


 


 


 


 26.7 %


(36.0-47.0)


 


Mean Corpuscular Volume    87 fL () 


 


Mean Corpuscular Hemoglobin    27 pg (25-35) 


 


Mean Corpuscular Hemoglobin


Concent 


 


 


 31 g/dL


(31-37)


 


Red Cell Distribution Width


 


 


 


 19.3 %


(11.5-14.5)


 


Platelet Count


 


 


 


 554 x10^3/uL


(140-400)


 


Neutrophils (%) (Auto)    68 % (31-73) 


 


Lymphocytes (%) (Auto)    20 % (24-48) 


 


Monocytes (%) (Auto)    10 % (0-9) 


 


Eosinophils (%) (Auto)    1 % (0-3) 


 


Basophils (%) (Auto)    0 % (0-3) 


 


Neutrophils # (Auto)


 


 


 


 8.6 x10^3/uL


(1.8-7.7)


 


Lymphocytes # (Auto)


 


 


 


 2.6 x10^3/uL


(1.0-4.8)


 


Monocytes # (Auto)


 


 


 


 1.3 x10^3/uL


(0.0-1.1)


 


Eosinophils # (Auto)


 


 


 


 0.1 x10^3/uL


(0.0-0.7)


 


Basophils # (Auto)


 


 


 


 0.0 x10^3/uL


(0.0-0.2)


 


Sodium Level


 


 


 


 145 mmol/L


(136-145)


 


Potassium Level


 


 


 


 3.2 mmol/L


(3.5-5.1)


 


Chloride Level


 


 


 


 106 mmol/L


()


 


Carbon Dioxide Level


 


 


 


 29 mmol/L


(21-32)


 


Anion Gap    10 (6-14) 


 


Blood Urea Nitrogen


 


 


 


 34 mg/dL


(7-20)


 


Creatinine


 


 


 


 1.1 mg/dL


(0.6-1.0)


 


Estimated GFR


(Cockcroft-Gault) 


 


 


 52.8 





 


Glucose Level


 


 


 


 188 mg/dL


(70-99)


 


Calcium Level


 


 


 


 9.9 mg/dL


(8.5-10.1)


 


Phosphorus Level


 


 


 


 3.6 mg/dL


(2.6-4.7)


 


Test


 6/1/20


05:48 


 


 





 


Glucose (Fingerstick)


 189 mg/dL


(70-99) 


 


 











Medications





Active Scripts








 Medications  Dose


 Route/Sig


 Max Daily Dose Days Date Category


 


 Bisoprolol


 Fumarate 5 Mg


 Tablet  10 Mg


 PO DAILY


   3/16/20 Reported








Comments


cxr reviewed,





Impression


.


IMPRESSION:


1.  Acute hypoxemic respiratory failure secondary to ARDS status post trach,


2.  Gallstone pancreatitis


3.  Severe metabolic acidosis.stable


4.  Acute kidney injury-stable, Off HD-- continue to improve 


5.  Acute gallstone pancreatitis.


6.  Hypoalbuminemia.


7.  Moderate persistent effusions, s/p left thora  5/12


8.  Fever-  Per ID, per surgery--resolved 


9.  Chronic anemia


10. Covid 19 testing negative


11. Moderate to large ascites-S/P paracentisis


12.S/P paracentisis with 4 liters removed on 4/15/20


13. S/P IR drain placement on 5/8/2020





Plan


.


1.Continue supplemental oxygen via trach shield--  PMV/capping as tolerated/ prn

suction


2. s/p  thoracentesis, 5/12, 3 litres removed


3. Follow surgery recs-- S/P 3 drain placed in IR on 5/8/2020


4. Follow ID recs for ABX


5. Follow nephrology recs 


6. Continue TPN 


DVT/GI PPX: heparin SQ/ protonix 


PT/OT


D/W RN and pt





CODE:FULL











SHARYN SOLORZANO MD                  Jun 1, 2020 11:27

## 2020-06-01 NOTE — PDOC
Infectious Disease Note


Subjective


Subjective


awake in bed, ROBERT out





ROS


ROS


no n/v/d/sob





Vital Sign


Vital Signs





Vital Signs








  Date Time  Temp Pulse Resp B/P (MAP) Pulse Ox O2 Delivery O2 Flow Rate FiO2


 


6/1/20 08:10   48 160/79 (106) 100 Tracheal Collar  


 


6/1/20 07:00 99.4 138      





 99.4       


 


5/31/20 20:00       8.0 











Physical Exam


PHYSICAL EXAM


GENERAL: Propped up in bed, resting quietly, weak appearing 


HEENT:  Oral cavity clear,  NGT


NECK:  Trach present


LUNGS: Clear, nonlabored 


HEART:  S1, S2, regular 


ABDOMEN: mod distention, bowel sounds present, + ROBERT drains x 3


: [place] (4/14)


EXTREMITIES: Generalized edema. no cyanosis


SKIN: Warm and dry.  No generalized rash.  


CNS:Opens eyes to voice, very weak 


LUE-PICC (5/29) without signsof complications





Labs


Lab





Laboratory Tests








Test


 5/31/20


12:23 5/31/20


17:24 6/1/20


00:04 6/1/20


05:30


 


Glucose (Fingerstick)


 225 mg/dL


(70-99) 244 mg/dL


(70-99) 175 mg/dL


(70-99) 





 


White Blood Count


 


 


 


 12.6 x10^3/uL


(4.0-11.0)


 


Red Blood Count


 


 


 


 3.07 x10^6/uL


(3.50-5.40)


 


Hemoglobin


 


 


 


 8.4 g/dL


(12.0-15.5)


 


Hematocrit


 


 


 


 26.7 %


(36.0-47.0)


 


Mean Corpuscular Volume    87 fL () 


 


Mean Corpuscular Hemoglobin    27 pg (25-35) 


 


Mean Corpuscular Hemoglobin


Concent 


 


 


 31 g/dL


(31-37)


 


Red Cell Distribution Width


 


 


 


 19.3 %


(11.5-14.5)


 


Platelet Count


 


 


 


 554 x10^3/uL


(140-400)


 


Neutrophils (%) (Auto)    68 % (31-73) 


 


Lymphocytes (%) (Auto)    20 % (24-48) 


 


Monocytes (%) (Auto)    10 % (0-9) 


 


Eosinophils (%) (Auto)    1 % (0-3) 


 


Basophils (%) (Auto)    0 % (0-3) 


 


Neutrophils # (Auto)


 


 


 


 8.6 x10^3/uL


(1.8-7.7)


 


Lymphocytes # (Auto)


 


 


 


 2.6 x10^3/uL


(1.0-4.8)


 


Monocytes # (Auto)


 


 


 


 1.3 x10^3/uL


(0.0-1.1)


 


Eosinophils # (Auto)


 


 


 


 0.1 x10^3/uL


(0.0-0.7)


 


Basophils # (Auto)


 


 


 


 0.0 x10^3/uL


(0.0-0.2)


 


Sodium Level


 


 


 


 145 mmol/L


(136-145)


 


Potassium Level


 


 


 


 3.2 mmol/L


(3.5-5.1)


 


Chloride Level


 


 


 


 106 mmol/L


()


 


Carbon Dioxide Level


 


 


 


 29 mmol/L


(21-32)


 


Anion Gap    10 (6-14) 


 


Blood Urea Nitrogen


 


 


 


 34 mg/dL


(7-20)


 


Creatinine


 


 


 


 1.1 mg/dL


(0.6-1.0)


 


Estimated GFR


(Cockcroft-Gault) 


 


 


 52.8 





 


Glucose Level


 


 


 


 188 mg/dL


(70-99)


 


Calcium Level


 


 


 


 9.9 mg/dL


(8.5-10.1)


 


Phosphorus Level


 


 


 


 3.6 mg/dL


(2.6-4.7)


 


Test


 6/1/20


05:48 


 


 





 


Glucose (Fingerstick)


 189 mg/dL


(70-99) 


 


 











Micro








Objective


Assessment


Fever intermittent could be from underlying pancreatitis vs aspiration 


? Ileus with vomiting


Abd distention - U/S and CT reviewed s/p 0.4 L of opaque, debris-containing 

ascites was removed 5/6


Acute pancreatitis with persistent necrosis


  - 4/27 status post ROBERT drain placement + C paropsilosis; 5/6 + yeast & high 

amylase; s/p additional drains on 5/8


Anemia - S/p PRBCs


Cholelithiasis with thickening of the gallbladder wall.


Leucocytosis improved 


JED, hyperkalemia, Metabolic acidosis off dialysis


Acute hypoxic resp failure ,bilateral pleural effusion and atelectasis


hypocalcemia 


Prediabetes


HTN


s/p trach





Plan


Plan of Care


continue Zosyn May 27


f/u labs/cath tip culture 


Aggressive pulmonary toilet


Maintain aspiration precaution


Supportive care


PT and OT as tolerated





D/w nursing











LINN FRANZ MD                Jun 1, 2020 08:37

## 2020-06-01 NOTE — PDOC
Objective:


Objective:


Reviewed chart - drains removed yesterday, drainage from site.


Vital Signs:





                                   Vital Signs








  Date Time  Temp Pulse Resp B/P (MAP) Pulse Ox O2 Delivery O2 Flow Rate FiO2


 


6/1/20 08:10   48 160/79 (106) 100 Tracheal Collar  


 


6/1/20 08:00       10.0 


 


6/1/20 07:00 99.4 138      





 99.4       








Labs:





Laboratory Tests








Test


 5/31/20


12:23 5/31/20


17:24 6/1/20


00:04 6/1/20


05:30


 


Glucose (Fingerstick) 225 mg/dL  244 mg/dL  175 mg/dL  


 


White Blood Count    12.6 x10^3/uL 


 


Red Blood Count    3.07 x10^6/uL 


 


Hemoglobin    8.4 g/dL 


 


Hematocrit    26.7 % 


 


Mean Corpuscular Volume    87 fL 


 


Mean Corpuscular Hemoglobin    27 pg 


 


Mean Corpuscular Hemoglobin


Concent 


 


 


 31 g/dL 





 


Red Cell Distribution Width    19.3 % 


 


Platelet Count    554 x10^3/uL 


 


Neutrophils (%) (Auto)    68 % 


 


Lymphocytes (%) (Auto)    20 % 


 


Monocytes (%) (Auto)    10 % 


 


Eosinophils (%) (Auto)    1 % 


 


Basophils (%) (Auto)    0 % 


 


Neutrophils # (Auto)    8.6 x10^3/uL 


 


Lymphocytes # (Auto)    2.6 x10^3/uL 


 


Monocytes # (Auto)    1.3 x10^3/uL 


 


Eosinophils # (Auto)    0.1 x10^3/uL 


 


Basophils # (Auto)    0.0 x10^3/uL 


 


Sodium Level    145 mmol/L 


 


Potassium Level    3.2 mmol/L 


 


Chloride Level    106 mmol/L 


 


Carbon Dioxide Level    29 mmol/L 


 


Anion Gap    10 


 


Blood Urea Nitrogen    34 mg/dL 


 


Creatinine    1.1 mg/dL 


 


Estimated GFR


(Cockcroft-Gault) 


 


 


 52.8 





 


Glucose Level    188 mg/dL 


 


Calcium Level    9.9 mg/dL 


 


Phosphorus Level    3.6 mg/dL 


 


Test


 6/1/20


05:48 


 


 





 


Glucose (Fingerstick) 189 mg/dL    








ORDERED:  AEROBIC CULT GS                                                       

 





  GRAM STAIN  Final  


        Final





        ND


        Not Tested





  AEROBIC CULTURE  Preliminary  


        Preliminary





        No Growth on 06/01/20 at 0955


Imaging:


CXR 5/30


IMPRESSION: 


1. Small bilateral pleural effusions are mildly improved.





PE:


HEENT: trach collar


LUNGS: tachypneic


HEART: tachycardic


ABD: soft


NEURO/PSYCH: drowsy





A/P:


Complicated pancreatitis





--


Seems worse today, continue support.





Justicifation of Admission Dx:


Justifications for Admission:


Justification of Admission Dx:  Yes











RAGHAVENDRA MURCIA          Jun 1, 2020 10:56

## 2020-06-01 NOTE — NUR
SS following up with discharge planning. SS reviewed pt chart and discussed with pt RN. Pt 
is currently on trach collar 8-10 liters. Pt had ROBERT drains removed. Pt has pepe, TPN, and 
IV Zosyn. PT/OT continuing to work with pt. SS will continue to follow for discharge 
planning.

## 2020-06-02 VITALS — DIASTOLIC BLOOD PRESSURE: 90 MMHG | SYSTOLIC BLOOD PRESSURE: 150 MMHG

## 2020-06-02 VITALS — SYSTOLIC BLOOD PRESSURE: 155 MMHG | DIASTOLIC BLOOD PRESSURE: 89 MMHG

## 2020-06-02 VITALS — DIASTOLIC BLOOD PRESSURE: 98 MMHG | SYSTOLIC BLOOD PRESSURE: 140 MMHG

## 2020-06-02 VITALS — DIASTOLIC BLOOD PRESSURE: 77 MMHG | SYSTOLIC BLOOD PRESSURE: 142 MMHG

## 2020-06-02 VITALS — SYSTOLIC BLOOD PRESSURE: 120 MMHG | DIASTOLIC BLOOD PRESSURE: 67 MMHG

## 2020-06-02 VITALS — SYSTOLIC BLOOD PRESSURE: 115 MMHG | DIASTOLIC BLOOD PRESSURE: 63 MMHG

## 2020-06-02 VITALS — DIASTOLIC BLOOD PRESSURE: 88 MMHG | SYSTOLIC BLOOD PRESSURE: 145 MMHG

## 2020-06-02 VITALS — SYSTOLIC BLOOD PRESSURE: 133 MMHG | DIASTOLIC BLOOD PRESSURE: 70 MMHG

## 2020-06-02 LAB
ANION GAP SERPL CALC-SCNC: 9 MMOL/L (ref 6–14)
BUN SERPL-MCNC: 37 MG/DL (ref 7–20)
CALCIUM SERPL-MCNC: 10 MG/DL (ref 8.5–10.1)
CHLORIDE SERPL-SCNC: 109 MMOL/L (ref 98–107)
CO2 SERPL-SCNC: 29 MMOL/L (ref 21–32)
CREAT SERPL-MCNC: 1.1 MG/DL (ref 0.6–1)
GFR SERPLBLD BASED ON 1.73 SQ M-ARVRAT: 52.8 ML/MIN
GLUCOSE SERPL-MCNC: 163 MG/DL (ref 70–99)
POTASSIUM SERPL-SCNC: 3.7 MMOL/L (ref 3.5–5.1)
SODIUM SERPL-SCNC: 147 MMOL/L (ref 136–145)

## 2020-06-02 RX ADMIN — FENTANYL CITRATE PRN MCG: 50 INJECTION INTRAMUSCULAR; INTRAVENOUS at 19:36

## 2020-06-02 RX ADMIN — FUROSEMIDE SCH MG: 10 INJECTION, SOLUTION INTRAMUSCULAR; INTRAVENOUS at 16:59

## 2020-06-02 RX ADMIN — INSULIN LISPRO SCH UNITS: 100 INJECTION, SOLUTION INTRAVENOUS; SUBCUTANEOUS at 17:23

## 2020-06-02 RX ADMIN — ENOXAPARIN SODIUM SCH MG: 40 INJECTION SUBCUTANEOUS at 16:59

## 2020-06-02 RX ADMIN — INSULIN LISPRO SCH UNITS: 100 INJECTION, SOLUTION INTRAVENOUS; SUBCUTANEOUS at 12:26

## 2020-06-02 RX ADMIN — INSULIN LISPRO SCH UNITS: 100 INJECTION, SOLUTION INTRAVENOUS; SUBCUTANEOUS at 00:46

## 2020-06-02 RX ADMIN — Medication PRN EACH: at 13:16

## 2020-06-02 RX ADMIN — FUROSEMIDE SCH MG: 10 INJECTION, SOLUTION INTRAMUSCULAR; INTRAVENOUS at 08:36

## 2020-06-02 RX ADMIN — FENTANYL CITRATE PRN MCG: 50 INJECTION INTRAMUSCULAR; INTRAVENOUS at 23:57

## 2020-06-02 RX ADMIN — PANTOPRAZOLE SODIUM SCH MG: 40 INJECTION, POWDER, FOR SOLUTION INTRAVENOUS at 08:36

## 2020-06-02 RX ADMIN — PIPERACILLIN SODIUM AND TAZOBACTAM SODIUM SCH MLS/HR: 3; .375 INJECTION, POWDER, LYOPHILIZED, FOR SOLUTION INTRAVENOUS at 12:24

## 2020-06-02 RX ADMIN — INSULIN LISPRO SCH UNITS: 100 INJECTION, SOLUTION INTRAVENOUS; SUBCUTANEOUS at 06:24

## 2020-06-02 RX ADMIN — PIPERACILLIN SODIUM AND TAZOBACTAM SODIUM SCH MLS/HR: 3; .375 INJECTION, POWDER, LYOPHILIZED, FOR SOLUTION INTRAVENOUS at 00:35

## 2020-06-02 RX ADMIN — PIPERACILLIN SODIUM AND TAZOBACTAM SODIUM SCH MLS/HR: 3; .375 INJECTION, POWDER, LYOPHILIZED, FOR SOLUTION INTRAVENOUS at 05:57

## 2020-06-02 RX ADMIN — PIPERACILLIN SODIUM AND TAZOBACTAM SODIUM SCH MLS/HR: 3; .375 INJECTION, POWDER, LYOPHILIZED, FOR SOLUTION INTRAVENOUS at 16:59

## 2020-06-02 NOTE — NUR
Pharmacy TPN Dosing Note



S: JESENIA BEAN is a 49 year old F Currently receiving Central Continuous TPN started 
03/18/20



B:Pertinent PMH: Necrotizing pancreatitis

Height: 5 feet, 8 inches

Weight: 71.573704 kg



Current diet: NPO 



LABS:

Sodium:    147 

Potassium: 3.7 

Chloride:  109 

Calcium:   10.0 

Corrected Calcium: 11.60 

Magnesium: 2.2 

CO2:       29 

SCr:       1.1 

Glucose:   163-210 

Albumin:   2.0 

AST:       15 

ALT:       14 



TPN FORMULA:

TPN TYPE:  Central Continuous

AMINO ACIDS:         75 gm

DEXTROSE:            250 gm

LIPIDS:              40 gm

POTASSIUM CHLORIDE:  90 mEq

MAGNESIUM:           20 mEq

INSULIN:             20 units

MULTIPLE VITAMIN:    10 ml

TRACE ELEMENTS:      1 ml(s)



TPN PLAN:  

K normalized today. BG in range after current TPN started. No changes

today.

-Mag, Phos and Triglycerides ordered for 6/4





R: Continue same TPN formula.

Will monitor electrolytes, glucose, and tolerance to TPN.



 LORENZO LESLIE RPH, 06/02/20 [id number]

## 2020-06-02 NOTE — NUR
Pt has been anxious since change of shift. Mother at bedside. Pt coughing frequently. Sat 
was in mid 80's. Trach care was done and the end of the inner canula was found to be plugged 
with thick white mucous. Patient was suctioned and now is much calmer and drifting off to 
sleep. Sat is now in mid to high 90's.

## 2020-06-02 NOTE — NUR
Order received from Dr. Franco and Dr. Castano to D/C Ativan. Patient very groggy, not able to 
participate w/ PT/OT at this time. Fentanyl remains active for PRN pain.

## 2020-06-02 NOTE — NUR
SS following up with discharge planning. SS reviewed pt chart and discussed with RN. Pt is 
currently on trach collar and on room air. Pt sitting up in chair. Pt is currently on TPN 
and IV Zosyn. Pt working with PT/OT. SS will continue to follow for discharge planning.

## 2020-06-02 NOTE — PDOC
Subjective:


Subjective:


Gives me a thumbs down.





Objective:


Vital Signs:





                                   Vital Signs








  Date Time  Temp Pulse Resp B/P (MAP) Pulse Ox O2 Delivery O2 Flow Rate FiO2


 


6/2/20 03:30 99.1 125 40 133/70 (91) 99 Tracheal Collar 10.0 





 99.1       








Labs:





Laboratory Tests








Test


 6/1/20


12:14 6/1/20


17:42 6/2/20


00:41 6/2/20


06:12


 


Glucose (Fingerstick) 197 mg/dL  183 mg/dL  210 mg/dL  


 


Sodium Level    147 mmol/L 


 


Potassium Level    3.7 mmol/L 


 


Chloride Level    109 mmol/L 


 


Carbon Dioxide Level    29 mmol/L 


 


Anion Gap    9 


 


Blood Urea Nitrogen    37 mg/dL 


 


Creatinine    1.1 mg/dL 


 


Estimated GFR


(Cockcroft-Gault) 


 


 


 52.8 





 


Glucose Level    163 mg/dL 


 


Calcium Level    10.0 mg/dL 


 


Test


 6/2/20


06:21 


 


 





 


Glucose (Fingerstick) 163 mg/dL    





ORDERED:  AEROBIC CULT GS                                                       

 


COMMENTS:                                                             





          PICC LINE








          Has specimen been collected/obtained? Y


-------------------------

-------------------------------------------------------------------





  Procedure                         Result                                      

         


 

--------------------------------------------------------------------------------


------------





  GRAM STAIN  Final  


        Final





        ND


        Not Tested





  AEROBIC CULTURE  Preliminary  


        Preliminary





        No Growth on 06/01/20 at 0955


        No Growth on 06/02/20 at 0927





PE:





GEN: NAD


LUNGS: trach collar


HEART: tachycardic


ABD: soft


NEURO/PSYCH: less perky than last week, drowsy





A/P:


Complicated pancreatitis





--


Continue support.





Justicifation of Admission Dx:


Justifications for Admission:


Justification of Admission Dx:  Yes











RAGHAVENDRA MURCIA          Jun 2, 2020 10:32

## 2020-06-02 NOTE — PDOC
SURGICAL PROGRESS NOTE


Subjective


Pt sleeping comfortably


d/w nursing


cont supportive care.


Vital Signs





Vital Signs








  Date Time  Temp Pulse Resp B/P (MAP) Pulse Ox O2 Delivery O2 Flow Rate FiO2


 


6/2/20 14:00  122 40 145/88 (107) 96 Room Air  


 


6/2/20 12:00 98.8       





 98.8       


 


6/2/20 03:30       10.0 








I&O











Intake and Output 


 


 6/2/20





 06:59


 


Intake Total 1125 ml


 


Output Total 1655 ml


 


Balance -530 ml


 


 


 


Intake Oral 15 ml


 


IV Total 1035 ml


 


Other 75 ml


 


Output Urine Total 1655 ml


 


# Bowel Movements 3








Labs





Laboratory Tests








Test


 5/31/20


17:24 6/1/20


00:04 6/1/20


05:30 6/1/20


05:48


 


Glucose (Fingerstick)


 244 mg/dL


(70-99) 175 mg/dL


(70-99) 


 189 mg/dL


(70-99)


 


White Blood Count


 


 


 12.6 x10^3/uL


(4.0-11.0) 





 


Red Blood Count


 


 


 3.07 x10^6/uL


(3.50-5.40) 





 


Hemoglobin


 


 


 8.4 g/dL


(12.0-15.5) 





 


Hematocrit


 


 


 26.7 %


(36.0-47.0) 





 


Mean Corpuscular Volume   87 fL ()  


 


Mean Corpuscular Hemoglobin   27 pg (25-35)  


 


Mean Corpuscular Hemoglobin


Concent 


 


 31 g/dL


(31-37) 





 


Red Cell Distribution Width


 


 


 19.3 %


(11.5-14.5) 





 


Platelet Count


 


 


 554 x10^3/uL


(140-400) 





 


Neutrophils (%) (Auto)   68 % (31-73)  


 


Lymphocytes (%) (Auto)   20 % (24-48)  


 


Monocytes (%) (Auto)   10 % (0-9)  


 


Eosinophils (%) (Auto)   1 % (0-3)  


 


Basophils (%) (Auto)   0 % (0-3)  


 


Neutrophils # (Auto)


 


 


 8.6 x10^3/uL


(1.8-7.7) 





 


Lymphocytes # (Auto)


 


 


 2.6 x10^3/uL


(1.0-4.8) 





 


Monocytes # (Auto)


 


 


 1.3 x10^3/uL


(0.0-1.1) 





 


Eosinophils # (Auto)


 


 


 0.1 x10^3/uL


(0.0-0.7) 





 


Basophils # (Auto)


 


 


 0.0 x10^3/uL


(0.0-0.2) 





 


Sodium Level


 


 


 145 mmol/L


(136-145) 





 


Potassium Level


 


 


 3.2 mmol/L


(3.5-5.1) 





 


Chloride Level


 


 


 106 mmol/L


() 





 


Carbon Dioxide Level


 


 


 29 mmol/L


(21-32) 





 


Anion Gap   10 (6-14)  


 


Blood Urea Nitrogen


 


 


 34 mg/dL


(7-20) 





 


Creatinine


 


 


 1.1 mg/dL


(0.6-1.0) 





 


Estimated GFR


(Cockcroft-Gault) 


 


 52.8 


 





 


Glucose Level


 


 


 188 mg/dL


(70-99) 





 


Calcium Level


 


 


 9.9 mg/dL


(8.5-10.1) 





 


Phosphorus Level


 


 


 3.6 mg/dL


(2.6-4.7) 





 


Test


 6/1/20


12:14 6/1/20


17:42 6/2/20


00:41 6/2/20


06:12


 


Glucose (Fingerstick)


 197 mg/dL


(70-99) 183 mg/dL


(70-99) 210 mg/dL


(70-99) 





 


Sodium Level


 


 


 


 147 mmol/L


(136-145)


 


Potassium Level


 


 


 


 3.7 mmol/L


(3.5-5.1)


 


Chloride Level


 


 


 


 109 mmol/L


()


 


Carbon Dioxide Level


 


 


 


 29 mmol/L


(21-32)


 


Anion Gap    9 (6-14) 


 


Blood Urea Nitrogen


 


 


 


 37 mg/dL


(7-20)


 


Creatinine


 


 


 


 1.1 mg/dL


(0.6-1.0)


 


Estimated GFR


(Cockcroft-Gault) 


 


 


 52.8 





 


Glucose Level


 


 


 


 163 mg/dL


(70-99)


 


Calcium Level


 


 


 


 10.0 mg/dL


(8.5-10.1)


 


Test


 6/2/20


06:21 6/2/20


12:22 


 





 


Glucose (Fingerstick)


 163 mg/dL


(70-99) 169 mg/dL


(70-99) 


 











Laboratory Tests








Test


 6/1/20


17:42 6/2/20


00:41 6/2/20


06:12 6/2/20


06:21


 


Glucose (Fingerstick)


 183 mg/dL


(70-99) 210 mg/dL


(70-99) 


 163 mg/dL


(70-99)


 


Sodium Level


 


 


 147 mmol/L


(136-145) 





 


Potassium Level


 


 


 3.7 mmol/L


(3.5-5.1) 





 


Chloride Level


 


 


 109 mmol/L


() 





 


Carbon Dioxide Level


 


 


 29 mmol/L


(21-32) 





 


Anion Gap   9 (6-14)  


 


Blood Urea Nitrogen


 


 


 37 mg/dL


(7-20) 





 


Creatinine


 


 


 1.1 mg/dL


(0.6-1.0) 





 


Estimated GFR


(Cockcroft-Gault) 


 


 52.8 


 





 


Glucose Level


 


 


 163 mg/dL


(70-99) 





 


Calcium Level


 


 


 10.0 mg/dL


(8.5-10.1) 





 


Test


 6/2/20


12:22 


 


 





 


Glucose (Fingerstick)


 169 mg/dL


(70-99) 


 


 











Problem List


Problems


Medical Problems:


(1) Acute pancreatitis


Status: Acute  





(2) Cholelithiasis


Status: Acute  











Justicifation of Admission Dx:


Justifications for Admission:


Justification of Admission Dx:  Yes











GAMAL ZHOU MD              Jun 2, 2020 16:36

## 2020-06-02 NOTE — NUR
Patient walked to shower w/ 2 RN's, walker. Sat in shower chair. Barely able to stay awake. 
Upon transporting patient back to room with walker, gait belt, patient had to be placed in 
chair due to grogginess, weakness. Patient slept for multiple hours. 

Upon becoming more alert, less tired, PT and RN took patient outside via walking and 
wheelchair. Patient tolerated well. 



Trach care performed, dressings on abd changed, patient placed back in bed around 1500. 
Sleeping at this time.

## 2020-06-02 NOTE — PDOC
[facility] PROGRESS NOTE


Chief Complaint


Chief Complaint


Acute hypoxic Respiratory failure requiring mechanical ventilation (now 

extubated for several days but still with tracheostomy)


Tracheostomy


bilateral pleural effusions/pulm edema 


Sepsis


Severe Acute gallstone pancreatitis (not a surgical candidate at this time) with

necrosis


Acute kidney failure now requiring dialysis


Salpingitis


Gallstones (Calculus of gallbladder with acute cholecystitis without obstruc

tion)


HTN 


Leukocytosis 


Hypoxia


Uterine fibroid


Intractable pain


Intractable nausea


Covid 19 negative. 


Acute on chronic anemia 


EEG: No seizure activityFever  - better currently - intermittent could be from 

underlying pancreatitis blood cults 5/4 - neg so far


? Ileus with vomiting


Abd distention - U/S and CT reviewed s/p 0.4 L of opaque, debris-containing 

ascites was removed 5/6


Acute pancreatitis with persistent necrosis


- 4/27 status post ROBERT drain placement + C paropsilosis. s/p additional drains 

5/8


Anemia - S/p PRBCs


Cholelithiasis with thickening of the gallbladder wall.


Leucocytosis improving


JED, hyperkalemia, Metabolic acidosis off dialysis


Acute hypoxic resp failure ,bilateral pleural effusion and atelectasis


hypocalcemia 


Prediabetes


HTN


s/p trach


ESRD on HD


Hyperglycemia





History of Present Illness


History of Present Illness


6/2/2020


Patient seen and examined in the ICU


She is a little more alert today but still quite ill


Appears clammy and pale and depressed/anxious


Discussed with RN


Chart reviewed











6/1/2020


Patient seen and examined in the ICU


She appears extremely ill


She is tachypneic at 35 respirations per minute and tachycardic at 132 bpm


She is extremely encephalopathic and shaky


She appears clammy


Chart reviewed


Discussed with RN


Prognosis extremely guarded at best








5/31/2020


Patient still in ICU


Resting with no apparent distress


Chart reviewed








5/30/2020


Patient seen and examined in the ICU


She is wiping her face with a cough


Discussed with RN


Chart reviewed


We hope to get her out of the ICU later today if possible








5/29/2020


Patient seen and examined in the ICU once again


She is back on NG suction


On IV Zosyn


Has IV TPN


Sedated with Precedex but anxious still


Appears somewhat clammy and pale


Chart reviewed


Discussed with RN


She remains critically ill














5/28/2020


Patient seen and examined in the ICU


She has an NG that is clamped we are hoping to start some clear liquids but she 

looks quite ill


She is on IV TPN


Meropenem changed to IV Zosyn (agree)


She has [place] to bedside drainage


She is semi-sedated with Precedex


Chart reviewed


Discussed with RN


She remains critically ill











Seen bedside. Hb 8. She was just a bit hypoxic, had a mucous plug suctioned. 

Able to vocalize well with speaking valve, tells me we are being very hard on 

her and she would like to be drugged back to sleep.  She wants to wake up and 

feel better. On trach shield, 


T max 100.4. afebrile this a.m.





5/26/2020


Patient seen and examined in the ICU


She is up in the chair very frail trying to talk a little shaky


Discussed with RN


Chart reviewed








5/25/2020


Patient seen and examined in the ICU


Patient up in the chair


Having a severe coughing episode with a lot of phlegm coming out of her 

tracheostomy


Discussed with RN


Discussed with physical therapy


Chart reviewed











5/24,.    feels well,  has been out of room in wheelchair


no complaint,    still weak,   some with not wanting to wear her valve on trach


cont other,   may be able to work with speech tomorrow,    kaliwallow OK to 

try, 








5/23,  anxiety is up today,   she dislikes the valve still,    


shower and outside today


.   





5/22 she doesnt want to wear her passy-kristan valve,  discussed str and plan with 

her.


speech following,  needs swallow study,   but needs to wear her valve longer,  


cont current


able to walk some, walker 





5/21  stronger,   better,   


we discussed better oral care


she would like to try swallow study,  wants to try to eat,    speech is 

following








5/20/2020 


She remains in the ICU


sitting up and working with OT,   getting better


if limit pain meds, may do better off the vent, 





Nurses trying to suction her,  that is also improved, 


Chart reviewed


 








5/19/2020


Patient seen and examined in the ICU


She had an episode yesterday of tachycardia and severe agitation we gave her 

some Ativan


After that she seemed to have stroke symptoms but now that the Ativan has wore 

off her stroke symptoms have resolved


 


 


She is on IV meropenem and daptomycin and micafungin


Chart reviewed


Discussed with RN


Patient is still critically ill


 


BRIEF OPERATIVE NOTE


Pre-Op Diagnosis


Pancreatitis with pseudocysts, suspected infection


Post-Op Diagnosis


same


Procedure Performed


CT abdominal Drains x 3


Surgeon


Hardy


Anesthesia Type:  Conscious Sedation


Findings


3 abdominal drains, 14F, with turbid pancreatic fluid and necrotic debris in 

each.


Complications


No immediate








5/9: Patient today somewhat restless and having bilious secretions from ET tube,

imaging studies ordered, discussed with consultant. Pretty poor prognosis, 

hopefully is not a fistula, poor surgical candidate. 





5/10: Imaging with no acute events, she seems more stable today compared to 

yesterday. Encouraged as much activity as possible patient at high risk for 

severe depression.





Vitals/I&O


Vitals/I&O:





                                   Vital Signs








  Date Time  Temp Pulse Resp B/P (MAP) Pulse Ox O2 Delivery O2 Flow Rate FiO2


 


6/2/20 03:30 99.1 125 40 133/70 (91) 99 Tracheal Collar 10.0 





 99.1       














                                    I & O   


 


 6/1/20 6/1/20 6/2/20





 14:59 22:59 06:59


 


Intake Total  475 ml 650 ml


 


Output Total  1150 ml 505 ml


 


Balance  -675 ml 145 ml











Physical Exam


Physical Exam:


GENERAL: Propped up in bed,, weak appearing see above


HEENT:  Oral cavity clear,  NGT


NECK:  Trach present


LUNGS: Clear, nonlabored 


HEART:  S1, S2, regular 


ABDOMEN: mod distention, bowel sounds present, + ROBERT drains x 3


: [place] (4/14)


EXTREMITIES: Generalized edema. no cyanosis


SKIN: Cool clammy and pale


CNS:Opens eyes to voice, very weak 


LUE-PICC (5/29) without signsof complications


General:  Alert, Cooperative, No acute distress


Heart:  Regular rate, Normal S1, Normal S2, No murmurs, Gallops


Lungs:  Other (Good air movement but having a lot of productive sputum from her 

tracheostomy site)


Abdomen:  Soft


Extremities:  No clubbing, No cyanosis, No edema, Normal pulses, No tenderness

/swelling


Skin:  Other (warm, dry)





Labs


Labs:





Laboratory Tests








Test


 6/1/20


12:14 6/1/20


17:42 6/2/20


00:41 6/2/20


06:12


 


Glucose (Fingerstick)


 197 mg/dL


(70-99) 183 mg/dL


(70-99) 210 mg/dL


(70-99) 





 


Sodium Level


 


 


 


 147 mmol/L


(136-145)


 


Potassium Level


 


 


 


 3.7 mmol/L


(3.5-5.1)


 


Chloride Level


 


 


 


 109 mmol/L


()


 


Carbon Dioxide Level


 


 


 


 29 mmol/L


(21-32)


 


Anion Gap    9 (6-14) 


 


Blood Urea Nitrogen


 


 


 


 37 mg/dL


(7-20)


 


Creatinine


 


 


 


 1.1 mg/dL


(0.6-1.0)


 


Estimated GFR


(Cockcroft-Gault) 


 


 


 52.8 





 


Glucose Level


 


 


 


 163 mg/dL


(70-99)


 


Calcium Level


 


 


 


 10.0 mg/dL


(8.5-10.1)


 


Test


 6/2/20


06:21 


 


 





 


Glucose (Fingerstick)


 163 mg/dL


(70-99) 


 


 














Review of Systems


Review of Systems:


Complains of anxiety complains of weakness can barely talk





Assessment and Plan


Assessmemt and Plan


Problems


Medical Problems:


(1) Acute pancreatitis


Status: Acute  





(2) Cholelithiasis


Status: Acute  





Acute hypoxic Respiratory failure requiring mechanical ventilation was initially

intubated on 3/23 was off for couple of days now back on


Severe gallstone pancreatitis (not a surgical candidate at this time) with 

necrosis


Tracheostomy


bilateral pleural effusions/pulm edema 


Severe sepsis


Acute kidney failure now requiring dialysis


Salpingitis


Gallstones (Calculus of gallbladder with acute cholecystitis without 

obstruction)


HTN 


Leukocytosis 


Hypoxia


Uterine fibroid


Intractable pain


Intractable nausea


Covid 19 negative. 


Acute on chronic anemia 


EEG: No seizure activityFever  - better currently - intermittent could be from 

underlying pancreatitis blood cults 5/4 - neg so far


? Ileus with vomiting


Abd distention - U/S and CT reviewed s/p 0.4 L of opaque, debris-containing 

ascites was removed 5/6


Acute pancreatitis with persistent necrosis


- 4/27 status post ROBERT drain placement + C paropsilosis. s/p additional drains 

5/8


Anemia - S/p PRBCs


Cholelithiasis with thickening of the gallbladder wall.


Leucocytosis improving


JED, hyperkalemia, Metabolic acidosis off dialysis


Acute hypoxic resp failure ,bilateral pleural effusion and atelectasis


hypocalcemia 


Prediabetes


HTN


s/p trach


ESRD on HD


Hyperglycemia








Plan


ICU monitoring


Wound care


Trying to minimize sedation but she gets anxious so easily


Humidified O2 via nasal cannula for now but we can use trach shield as backup


NG suctioning


IV Zosyn


Nebulizers


Continue IV antibiotics and micafungin


Sedation with Precedex PRN


TPN protocol


Continue [place] to bedside drainage


Tracheostomy care


Hope to eventually move towards decannulation (we have a speaking valve for now)


Trend labs


Appreciate subspecialist input


Prognosis extremely guarded at best! (she scored a 9 on Vandana criteria 4 weeks 

ago).


She remains critically ill 


Total time 31





Comment


Review of Relevant


I have reviewed the following items josy (where applicable) has been applied.


Medications:





Current Medications








 Medications


  (Trade)  Dose


 Ordered  Sig/Yee


 Route


 PRN Reason  Start Time


 Stop Time Status Last Admin


Dose Admin


 


 Potassium


 Chloride 90 meq/


 Magnesium Sulfate


 20 meq/


 Multivitamins 10


 ml/Chromium/


 Copper/Manganese/


 Seleni/Zn 1 ml/


 Insulin Human


 Regular 20 unit/


 Total Parenteral


 Nutrition/Amino


 Acids/Dextrose/


 Fat Emulsion


 Intravenous  1,800 ml @ 


 75 mls/hr  TPN  CONT


 IV


   6/1/20 22:00


 6/2/20 21:59  6/1/20 22:28














Justicifation of Admission Dx:


Justifications for Admission:


Justification of Admission Dx:  Yes











HECTOR MASON III DO            Jun 2, 2020 10:20

## 2020-06-02 NOTE — PDOC
PULMONARY PROGRESS NOTES


Subjective


Patient intubated on 3/23 , s/p trach 4/6, has cough


Remains on TS, increase secretions


sedated with ativan today


Vitals





Vital Signs








  Date Time  Temp Pulse Resp B/P (MAP) Pulse Ox O2 Delivery O2 Flow Rate FiO2


 


6/2/20 03:30 99.1 125 40 133/70 (91) 99 Tracheal Collar 10.0 





 99.1       








ROS:  No Chest Pain, No Abdominal Pain, No Increase Cough


General:  No acute distress


HEENT:  Other (trach midline )


Lungs:  Other (few rhonchi)


Cardiovascular:  S1, S2


Abdomen:  Soft, Non-tender


Neuro Exam:  Alert


Extremities:  Other (+3 generalized edema )


Skin:  Warm, Dry


Labs





Laboratory Tests








Test


 5/31/20


12:23 5/31/20


17:24 6/1/20


00:04 6/1/20


05:30


 


Glucose (Fingerstick)


 225 mg/dL


(70-99) 244 mg/dL


(70-99) 175 mg/dL


(70-99) 





 


White Blood Count


 


 


 


 12.6 x10^3/uL


(4.0-11.0)


 


Red Blood Count


 


 


 


 3.07 x10^6/uL


(3.50-5.40)


 


Hemoglobin


 


 


 


 8.4 g/dL


(12.0-15.5)


 


Hematocrit


 


 


 


 26.7 %


(36.0-47.0)


 


Mean Corpuscular Volume    87 fL () 


 


Mean Corpuscular Hemoglobin    27 pg (25-35) 


 


Mean Corpuscular Hemoglobin


Concent 


 


 


 31 g/dL


(31-37)


 


Red Cell Distribution Width


 


 


 


 19.3 %


(11.5-14.5)


 


Platelet Count


 


 


 


 554 x10^3/uL


(140-400)


 


Neutrophils (%) (Auto)    68 % (31-73) 


 


Lymphocytes (%) (Auto)    20 % (24-48) 


 


Monocytes (%) (Auto)    10 % (0-9) 


 


Eosinophils (%) (Auto)    1 % (0-3) 


 


Basophils (%) (Auto)    0 % (0-3) 


 


Neutrophils # (Auto)


 


 


 


 8.6 x10^3/uL


(1.8-7.7)


 


Lymphocytes # (Auto)


 


 


 


 2.6 x10^3/uL


(1.0-4.8)


 


Monocytes # (Auto)


 


 


 


 1.3 x10^3/uL


(0.0-1.1)


 


Eosinophils # (Auto)


 


 


 


 0.1 x10^3/uL


(0.0-0.7)


 


Basophils # (Auto)


 


 


 


 0.0 x10^3/uL


(0.0-0.2)


 


Sodium Level


 


 


 


 145 mmol/L


(136-145)


 


Potassium Level


 


 


 


 3.2 mmol/L


(3.5-5.1)


 


Chloride Level


 


 


 


 106 mmol/L


()


 


Carbon Dioxide Level


 


 


 


 29 mmol/L


(21-32)


 


Anion Gap    10 (6-14) 


 


Blood Urea Nitrogen


 


 


 


 34 mg/dL


(7-20)


 


Creatinine


 


 


 


 1.1 mg/dL


(0.6-1.0)


 


Estimated GFR


(Cockcroft-Gault) 


 


 


 52.8 





 


Glucose Level


 


 


 


 188 mg/dL


(70-99)


 


Calcium Level


 


 


 


 9.9 mg/dL


(8.5-10.1)


 


Phosphorus Level


 


 


 


 3.6 mg/dL


(2.6-4.7)


 


Test


 6/1/20


05:48 6/1/20


12:14 6/1/20


17:42 6/2/20


00:41


 


Glucose (Fingerstick)


 189 mg/dL


(70-99) 197 mg/dL


(70-99) 183 mg/dL


(70-99) 210 mg/dL


(70-99)


 


Test


 6/2/20


06:12 6/2/20


06:21 


 





 


Sodium Level


 147 mmol/L


(136-145) 


 


 





 


Potassium Level


 3.7 mmol/L


(3.5-5.1) 


 


 





 


Chloride Level


 109 mmol/L


() 


 


 





 


Carbon Dioxide Level


 29 mmol/L


(21-32) 


 


 





 


Anion Gap 9 (6-14)    


 


Blood Urea Nitrogen


 37 mg/dL


(7-20) 


 


 





 


Creatinine


 1.1 mg/dL


(0.6-1.0) 


 


 





 


Estimated GFR


(Cockcroft-Gault) 52.8 


 


 


 





 


Glucose Level


 163 mg/dL


(70-99) 


 


 





 


Calcium Level


 10.0 mg/dL


(8.5-10.1) 


 


 





 


Glucose (Fingerstick)


 


 163 mg/dL


(70-99) 


 











Laboratory Tests








Test


 6/1/20


12:14 6/1/20


17:42 6/2/20


00:41 6/2/20


06:12


 


Glucose (Fingerstick)


 197 mg/dL


(70-99) 183 mg/dL


(70-99) 210 mg/dL


(70-99) 





 


Sodium Level


 


 


 


 147 mmol/L


(136-145)


 


Potassium Level


 


 


 


 3.7 mmol/L


(3.5-5.1)


 


Chloride Level


 


 


 


 109 mmol/L


()


 


Carbon Dioxide Level


 


 


 


 29 mmol/L


(21-32)


 


Anion Gap    9 (6-14) 


 


Blood Urea Nitrogen


 


 


 


 37 mg/dL


(7-20)


 


Creatinine


 


 


 


 1.1 mg/dL


(0.6-1.0)


 


Estimated GFR


(Cockcroft-Gault) 


 


 


 52.8 





 


Glucose Level


 


 


 


 163 mg/dL


(70-99)


 


Calcium Level


 


 


 


 10.0 mg/dL


(8.5-10.1)


 


Test


 6/2/20


06:21 


 


 





 


Glucose (Fingerstick)


 163 mg/dL


(70-99) 


 


 











Medications





Active Scripts








 Medications  Dose


 Route/Sig


 Max Daily Dose Days Date Category


 


 Bisoprolol


 Fumarate 5 Mg


 Tablet  10 Mg


 PO DAILY


   3/16/20 Reported








Comments


cxr reviewed,





Impression


.


IMPRESSION:


1.  Acute hypoxemic respiratory failure secondary to ARDS status post trach,


2.  Gallstone pancreatitis


3.  Severe metabolic acidosis.stable


4.  Acute kidney injury-stable, Off HD-- continue to improve 


5.  Acute gallstone pancreatitis.


6.  Hypoalbuminemia.


7.  Moderate persistent effusions, s/p left thora  5/12


8.  Fever-  Per ID, per surgery--resolved 


9.  Chronic anemia


10. Covid 19 testing negative


11. Moderate to large ascites-S/P paracentisis


12.S/P paracentisis with 4 liters removed on 4/15/20


13. S/P IR drain placement on 5/8/2020





Plan


.


1.Continue supplemental oxygen via trach shield--  PMV/capping as tolerated/ prn

suction. dc ativan


2. s/p  thoracentesis, 5/12, 3 litres removed


3. Follow surgery recs-- S/P 3 drain placed in IR on 5/8/2020


4. Follow ID recs for ABX


5. Follow nephrology recs 


6. Continue TPN 


DVT/GI PPX: heparin SQ/ protonix 


PT/OT


D/W RN and pt





CODE:FULL











SHARYN SOLORZANO MD                  Jun 2, 2020 11:12

## 2020-06-03 VITALS — SYSTOLIC BLOOD PRESSURE: 128 MMHG | DIASTOLIC BLOOD PRESSURE: 76 MMHG

## 2020-06-03 VITALS — SYSTOLIC BLOOD PRESSURE: 123 MMHG | DIASTOLIC BLOOD PRESSURE: 87 MMHG

## 2020-06-03 VITALS — SYSTOLIC BLOOD PRESSURE: 144 MMHG | DIASTOLIC BLOOD PRESSURE: 88 MMHG

## 2020-06-03 VITALS — SYSTOLIC BLOOD PRESSURE: 135 MMHG | DIASTOLIC BLOOD PRESSURE: 78 MMHG

## 2020-06-03 VITALS — SYSTOLIC BLOOD PRESSURE: 153 MMHG | DIASTOLIC BLOOD PRESSURE: 82 MMHG

## 2020-06-03 LAB
ANION GAP SERPL CALC-SCNC: 10 MMOL/L (ref 6–14)
BASOPHILS # BLD AUTO: 0.1 X10^3/UL (ref 0–0.2)
BASOPHILS NFR BLD: 1 % (ref 0–3)
BUN SERPL-MCNC: 39 MG/DL (ref 7–20)
CALCIUM SERPL-MCNC: 10.6 MG/DL (ref 8.5–10.1)
CHLORIDE SERPL-SCNC: 111 MMOL/L (ref 98–107)
CO2 SERPL-SCNC: 27 MMOL/L (ref 21–32)
CREAT SERPL-MCNC: 1.2 MG/DL (ref 0.6–1)
EOSINOPHIL NFR BLD: 0.1 X10^3/UL (ref 0–0.7)
EOSINOPHIL NFR BLD: 1 % (ref 0–3)
ERYTHROCYTE [DISTWIDTH] IN BLOOD BY AUTOMATED COUNT: 20 % (ref 11.5–14.5)
GFR SERPLBLD BASED ON 1.73 SQ M-ARVRAT: 47.7 ML/MIN
GLUCOSE SERPL-MCNC: 208 MG/DL (ref 70–99)
HCT VFR BLD CALC: 27.4 % (ref 36–47)
HGB BLD-MCNC: 8.5 G/DL (ref 12–15.5)
LYMPHOCYTES # BLD: 2.3 X10^3/UL (ref 1–4.8)
LYMPHOCYTES NFR BLD AUTO: 18 % (ref 24–48)
MCH RBC QN AUTO: 27 PG (ref 25–35)
MCHC RBC AUTO-ENTMCNC: 31 G/DL (ref 31–37)
MCV RBC AUTO: 88 FL (ref 79–100)
MONO #: 0.9 X10^3/UL (ref 0–1.1)
MONOCYTES NFR BLD: 7 % (ref 0–9)
NEUT #: 9.5 X10^3/UL (ref 1.8–7.7)
NEUTROPHILS NFR BLD AUTO: 74 % (ref 31–73)
PLATELET # BLD AUTO: 594 X10^3/UL (ref 140–400)
POTASSIUM SERPL-SCNC: 3.4 MMOL/L (ref 3.5–5.1)
RBC # BLD AUTO: 3.11 X10^6/UL (ref 3.5–5.4)
SODIUM SERPL-SCNC: 148 MMOL/L (ref 136–145)
WBC # BLD AUTO: 12.8 X10^3/UL (ref 4–11)

## 2020-06-03 RX ADMIN — ENOXAPARIN SODIUM SCH MG: 40 INJECTION SUBCUTANEOUS at 18:02

## 2020-06-03 RX ADMIN — INSULIN LISPRO SCH UNITS: 100 INJECTION, SOLUTION INTRAVENOUS; SUBCUTANEOUS at 00:15

## 2020-06-03 RX ADMIN — FENTANYL CITRATE PRN MCG: 50 INJECTION INTRAMUSCULAR; INTRAVENOUS at 23:14

## 2020-06-03 RX ADMIN — PIPERACILLIN SODIUM AND TAZOBACTAM SODIUM SCH MLS/HR: 3; .375 INJECTION, POWDER, LYOPHILIZED, FOR SOLUTION INTRAVENOUS at 18:01

## 2020-06-03 RX ADMIN — FUROSEMIDE SCH MG: 10 INJECTION, SOLUTION INTRAMUSCULAR; INTRAVENOUS at 13:30

## 2020-06-03 RX ADMIN — PANTOPRAZOLE SODIUM SCH MG: 40 INJECTION, POWDER, FOR SOLUTION INTRAVENOUS at 08:04

## 2020-06-03 RX ADMIN — Medication PRN EACH: at 12:07

## 2020-06-03 RX ADMIN — PIPERACILLIN SODIUM AND TAZOBACTAM SODIUM SCH MLS/HR: 3; .375 INJECTION, POWDER, LYOPHILIZED, FOR SOLUTION INTRAVENOUS at 12:59

## 2020-06-03 RX ADMIN — PIPERACILLIN SODIUM AND TAZOBACTAM SODIUM SCH MLS/HR: 3; .375 INJECTION, POWDER, LYOPHILIZED, FOR SOLUTION INTRAVENOUS at 06:33

## 2020-06-03 RX ADMIN — FENTANYL CITRATE PRN MCG: 50 INJECTION INTRAMUSCULAR; INTRAVENOUS at 10:03

## 2020-06-03 RX ADMIN — PIPERACILLIN SODIUM AND TAZOBACTAM SODIUM SCH MLS/HR: 3; .375 INJECTION, POWDER, LYOPHILIZED, FOR SOLUTION INTRAVENOUS at 00:07

## 2020-06-03 RX ADMIN — PIPERACILLIN SODIUM AND TAZOBACTAM SODIUM SCH MLS/HR: 3; .375 INJECTION, POWDER, LYOPHILIZED, FOR SOLUTION INTRAVENOUS at 23:13

## 2020-06-03 RX ADMIN — FUROSEMIDE SCH MG: 10 INJECTION, SOLUTION INTRAMUSCULAR; INTRAVENOUS at 08:04

## 2020-06-03 RX ADMIN — INSULIN LISPRO SCH UNITS: 100 INJECTION, SOLUTION INTRAVENOUS; SUBCUTANEOUS at 18:28

## 2020-06-03 RX ADMIN — INSULIN LISPRO SCH UNITS: 100 INJECTION, SOLUTION INTRAVENOUS; SUBCUTANEOUS at 13:02

## 2020-06-03 RX ADMIN — FENTANYL CITRATE PRN MCG: 50 INJECTION INTRAMUSCULAR; INTRAVENOUS at 14:04

## 2020-06-03 RX ADMIN — INSULIN LISPRO SCH UNITS: 100 INJECTION, SOLUTION INTRAVENOUS; SUBCUTANEOUS at 06:32

## 2020-06-03 RX ADMIN — FENTANYL CITRATE PRN MCG: 50 INJECTION INTRAMUSCULAR; INTRAVENOUS at 18:01

## 2020-06-03 NOTE — PDOC
Infectious Disease Note


Subjective


Subjective


awake in bed, ROBERT out





ROS


ROS


No nausea vomiting diarrhea chest pain





Vital Sign


Vital Signs





Vital Signs








  Date Time  Temp Pulse Resp B/P (MAP) Pulse Ox O2 Delivery O2 Flow Rate FiO2


 


6/3/20 10:03     97 Room Air  


 


6/3/20 07:00 98.4 126 38 153/82 (105)    





 98.4       











Physical Exam


PHYSICAL EXAM


GENERAL: Propped up in bed,, weak appearing see above


HEENT:  Oral cavity clear,  NGT


NECK:  Trach present


LUNGS: Clear, nonlabored 


HEART:  S1, S2, regular 


ABDOMEN: mod distention, bowel sounds present, + ROBERT drains x 3


: [place] (4/14)


EXTREMITIES: Generalized edema. no cyanosis


SKIN: Cool clammy and pale


CNS:Opens eyes to voice, very weak 


LUE-PICC (5/29) without signsof complications





Labs


Lab





Laboratory Tests








Test


 6/2/20


12:22 6/2/20


17:18 6/3/20


00:12 6/3/20


06:28


 


Glucose (Fingerstick)


 169 mg/dL


(70-99) 168 mg/dL


(70-99) 194 mg/dL


(70-99) 216 mg/dL


(70-99)


 


Test


 6/3/20


07:56 


 


 





 


White Blood Count


 12.8 x10^3/uL


(4.0-11.0) 


 


 





 


Red Blood Count


 3.11 x10^6/uL


(3.50-5.40) 


 


 





 


Hemoglobin


 8.5 g/dL


(12.0-15.5) 


 


 





 


Hematocrit


 27.4 %


(36.0-47.0) 


 


 





 


Mean Corpuscular Volume 88 fL ()    


 


Mean Corpuscular Hemoglobin 27 pg (25-35)    


 


Mean Corpuscular Hemoglobin


Concent 31 g/dL


(31-37) 


 


 





 


Red Cell Distribution Width


 20.0 %


(11.5-14.5) 


 


 





 


Platelet Count


 594 x10^3/uL


(140-400) 


 


 





 


Neutrophils (%) (Auto) 74 % (31-73)    


 


Lymphocytes (%) (Auto) 18 % (24-48)    


 


Monocytes (%) (Auto) 7 % (0-9)    


 


Eosinophils (%) (Auto) 1 % (0-3)    


 


Basophils (%) (Auto) 1 % (0-3)    


 


Neutrophils # (Auto)


 9.5 x10^3/uL


(1.8-7.7) 


 


 





 


Lymphocytes # (Auto)


 2.3 x10^3/uL


(1.0-4.8) 


 


 





 


Monocytes # (Auto)


 0.9 x10^3/uL


(0.0-1.1) 


 


 





 


Eosinophils # (Auto)


 0.1 x10^3/uL


(0.0-0.7) 


 


 





 


Basophils # (Auto)


 0.1 x10^3/uL


(0.0-0.2) 


 


 











Micro








Objective


Assessment


Fever intermittent could be from underlying pancreatitis vs aspiration 


? Ileus with vomiting


Abd distention - U/S and CT reviewed s/p 0.4 L of opaque, debris-containing asc

ites was removed 5/6


Acute pancreatitis with persistent necrosis


  - 4/27 status post ROBERT drain placement + C paropsilosis; 5/6 + yeast & high 

amylase; s/p additional drains on 5/8


Anemia - S/p PRBCs


Cholelithiasis with thickening of the gallbladder wall.


Leucocytosis improved 


JED, hyperkalemia, Metabolic acidosis off dialysis


Acute hypoxic resp failure ,bilateral pleural effusion and atelectasis


hypocalcemia 


Prediabetes


HTN


s/p trach





Plan


Plan of Care


continue Zosyn May 27


f/u labs/cath tip culture 


Aggressive pulmonary toilet


Maintain aspiration precaution


Supportive care


PT and OT as tolerated





D/w nursing











LINN FRANZ MD                Quintin 3, 2020 11:49

## 2020-06-03 NOTE — NUR
Dr Castano Paged to clarify lasix BID, potassium and sodium levels are increasing slowly-orders 
per Eyad to decrease dose to lasix 40mg IVP daily.

## 2020-06-03 NOTE — PDOC
SURGICAL PROGRESS NOTE


Subjective


Pt without new c/o


Vital Signs





Vital Signs








  Date Time  Temp Pulse Resp B/P (MAP) Pulse Ox O2 Delivery O2 Flow Rate FiO2


 


6/3/20 11:00 99.0 123 28 123/87 (99) 97 Room Air  





 99.0       








I&O











Intake and Output 


 


 6/3/20





 07:00


 


Intake Total 581 ml


 


Output Total 2250 ml


 


Balance -1669 ml


 


 


 


Intake Oral 15 ml


 


IV Total 566 ml


 


Output Urine Total 2250 ml


 


# Bowel Movements 1








General:  Alert, Cooperative, No acute distress


Abdomen:  Soft


Labs





Laboratory Tests








Test


 6/1/20


17:42 6/2/20


00:41 6/2/20


06:12 6/2/20


06:21


 


Glucose (Fingerstick)


 183 mg/dL


(70-99) 210 mg/dL


(70-99) 


 163 mg/dL


(70-99)


 


Sodium Level


 


 


 147 mmol/L


(136-145) 





 


Potassium Level


 


 


 3.7 mmol/L


(3.5-5.1) 





 


Chloride Level


 


 


 109 mmol/L


() 





 


Carbon Dioxide Level


 


 


 29 mmol/L


(21-32) 





 


Anion Gap   9 (6-14)  


 


Blood Urea Nitrogen


 


 


 37 mg/dL


(7-20) 





 


Creatinine


 


 


 1.1 mg/dL


(0.6-1.0) 





 


Estimated GFR


(Cockcroft-Gault) 


 


 52.8 


 





 


Glucose Level


 


 


 163 mg/dL


(70-99) 





 


Calcium Level


 


 


 10.0 mg/dL


(8.5-10.1) 





 


Test


 6/2/20


12:22 6/2/20


17:18 6/3/20


00:12 6/3/20


06:28


 


Glucose (Fingerstick)


 169 mg/dL


(70-99) 168 mg/dL


(70-99) 194 mg/dL


(70-99) 216 mg/dL


(70-99)


 


Test


 6/3/20


07:56 6/3/20


12:57 


 





 


White Blood Count


 12.8 x10^3/uL


(4.0-11.0) 


 


 





 


Red Blood Count


 3.11 x10^6/uL


(3.50-5.40) 


 


 





 


Hemoglobin


 8.5 g/dL


(12.0-15.5) 


 


 





 


Hematocrit


 27.4 %


(36.0-47.0) 


 


 





 


Mean Corpuscular Volume 88 fL ()    


 


Mean Corpuscular Hemoglobin 27 pg (25-35)    


 


Mean Corpuscular Hemoglobin


Concent 31 g/dL


(31-37) 


 


 





 


Red Cell Distribution Width


 20.0 %


(11.5-14.5) 


 


 





 


Platelet Count


 594 x10^3/uL


(140-400) 


 


 





 


Neutrophils (%) (Auto) 74 % (31-73)    


 


Lymphocytes (%) (Auto) 18 % (24-48)    


 


Monocytes (%) (Auto) 7 % (0-9)    


 


Eosinophils (%) (Auto) 1 % (0-3)    


 


Basophils (%) (Auto) 1 % (0-3)    


 


Neutrophils # (Auto)


 9.5 x10^3/uL


(1.8-7.7) 


 


 





 


Lymphocytes # (Auto)


 2.3 x10^3/uL


(1.0-4.8) 


 


 





 


Monocytes # (Auto)


 0.9 x10^3/uL


(0.0-1.1) 


 


 





 


Eosinophils # (Auto)


 0.1 x10^3/uL


(0.0-0.7) 


 


 





 


Basophils # (Auto)


 0.1 x10^3/uL


(0.0-0.2) 


 


 





 


Sodium Level


 148 mmol/L


(136-145) 


 


 





 


Potassium Level


 3.4 mmol/L


(3.5-5.1) 


 


 





 


Chloride Level


 111 mmol/L


() 


 


 





 


Carbon Dioxide Level


 27 mmol/L


(21-32) 


 


 





 


Anion Gap 10 (6-14)    


 


Blood Urea Nitrogen


 39 mg/dL


(7-20) 


 


 





 


Creatinine


 1.2 mg/dL


(0.6-1.0) 


 


 





 


Estimated GFR


(Cockcroft-Gault) 47.7 


 


 


 





 


Glucose Level


 208 mg/dL


(70-99) 


 


 





 


Calcium Level


 10.6 mg/dL


(8.5-10.1) 


 


 





 


Glucose (Fingerstick)


 


 233 mg/dL


(70-99) 


 











Laboratory Tests








Test


 6/2/20


17:18 6/3/20


00:12 6/3/20


06:28 6/3/20


07:56


 


Glucose (Fingerstick)


 168 mg/dL


(70-99) 194 mg/dL


(70-99) 216 mg/dL


(70-99) 





 


White Blood Count


 


 


 


 12.8 x10^3/uL


(4.0-11.0)


 


Red Blood Count


 


 


 


 3.11 x10^6/uL


(3.50-5.40)


 


Hemoglobin


 


 


 


 8.5 g/dL


(12.0-15.5)


 


Hematocrit


 


 


 


 27.4 %


(36.0-47.0)


 


Mean Corpuscular Volume    88 fL () 


 


Mean Corpuscular Hemoglobin    27 pg (25-35) 


 


Mean Corpuscular Hemoglobin


Concent 


 


 


 31 g/dL


(31-37)


 


Red Cell Distribution Width


 


 


 


 20.0 %


(11.5-14.5)


 


Platelet Count


 


 


 


 594 x10^3/uL


(140-400)


 


Neutrophils (%) (Auto)    74 % (31-73) 


 


Lymphocytes (%) (Auto)    18 % (24-48) 


 


Monocytes (%) (Auto)    7 % (0-9) 


 


Eosinophils (%) (Auto)    1 % (0-3) 


 


Basophils (%) (Auto)    1 % (0-3) 


 


Neutrophils # (Auto)


 


 


 


 9.5 x10^3/uL


(1.8-7.7)


 


Lymphocytes # (Auto)


 


 


 


 2.3 x10^3/uL


(1.0-4.8)


 


Monocytes # (Auto)


 


 


 


 0.9 x10^3/uL


(0.0-1.1)


 


Eosinophils # (Auto)


 


 


 


 0.1 x10^3/uL


(0.0-0.7)


 


Basophils # (Auto)


 


 


 


 0.1 x10^3/uL


(0.0-0.2)


 


Sodium Level


 


 


 


 148 mmol/L


(136-145)


 


Potassium Level


 


 


 


 3.4 mmol/L


(3.5-5.1)


 


Chloride Level


 


 


 


 111 mmol/L


()


 


Carbon Dioxide Level


 


 


 


 27 mmol/L


(21-32)


 


Anion Gap    10 (6-14) 


 


Blood Urea Nitrogen


 


 


 


 39 mg/dL


(7-20)


 


Creatinine


 


 


 


 1.2 mg/dL


(0.6-1.0)


 


Estimated GFR


(Cockcroft-Gault) 


 


 


 47.7 





 


Glucose Level


 


 


 


 208 mg/dL


(70-99)


 


Calcium Level


 


 


 


 10.6 mg/dL


(8.5-10.1)


 


Test


 6/3/20


12:57 


 


 





 


Glucose (Fingerstick)


 233 mg/dL


(70-99) 


 


 











Problem List


Problems


Medical Problems:


(1) Acute pancreatitis


Status: Acute  





(2) Cholelithiasis


Status: Acute  








Assessment/Plan


s/p lap exploration


cont supportive care


d/c planning.





Justicifation of Admission Dx:


Justifications for Admission:


Justification of Admission Dx:  Yes











GAMAL ZHOU MD              Quintin 3, 2020 14:03

## 2020-06-03 NOTE — PDOC
Subjective:


Subjective:


Doing "so-so."


D/w nurse - on room air for awhile, stooled yesterday and today.





Objective:


Vital Signs:





                                   Vital Signs








  Date Time  Temp Pulse Resp B/P (MAP) Pulse Ox O2 Delivery O2 Flow Rate FiO2


 


6/3/20 10:03     97 Room Air  


 


6/3/20 07:00 98.4 126 38 153/82 (105)    





 98.4       








Labs:





Laboratory Tests








Test


 6/2/20


12:22 6/2/20


17:18 6/3/20


00:12 6/3/20


06:28


 


Glucose (Fingerstick) 169 mg/dL  168 mg/dL  194 mg/dL  216 mg/dL 


 


Test


 6/3/20


07:56 


 


 





 


White Blood Count 12.8 x10^3/uL    


 


Red Blood Count 3.11 x10^6/uL    


 


Hemoglobin 8.5 g/dL    


 


Hematocrit 27.4 %    


 


Mean Corpuscular Volume 88 fL    


 


Mean Corpuscular Hemoglobin 27 pg    


 


Mean Corpuscular Hemoglobin


Concent 31 g/dL 


 


 


 





 


Red Cell Distribution Width 20.0 %    


 


Platelet Count 594 x10^3/uL    


 


Neutrophils (%) (Auto) 74 %    


 


Lymphocytes (%) (Auto) 18 %    


 


Monocytes (%) (Auto) 7 %    


 


Eosinophils (%) (Auto) 1 %    


 


Basophils (%) (Auto) 1 %    


 


Neutrophils # (Auto) 9.5 x10^3/uL    


 


Lymphocytes # (Auto) 2.3 x10^3/uL    


 


Monocytes # (Auto) 0.9 x10^3/uL    


 


Eosinophils # (Auto) 0.1 x10^3/uL    


 


Basophils # (Auto) 0.1 x10^3/uL    











PE:





HEENT: trach/room air


HEART: tachycardic


ABD: soft/less distended, dressing in place


NEURO/PSYCH: awake, doesn't verbalize





A/P:


Pancreatitis, MOSF





--


Supportive care.





Justicifation of Admission Dx:


Justifications for Admission:


Justification of Admission Dx:  Yes











RGAHAVENDRA MURCIA          Quintin 3, 2020 10:18

## 2020-06-03 NOTE — PDOC
[facility] PROGRESS NOTE


Chief Complaint


Chief Complaint


Acute hypoxic Respiratory failure requiring mechanical ventilation (now 

extubated for several days but still with tracheostomy)


Tracheostomy


bilateral pleural effusions/pulm edema 


Sepsis


Severe Acute gallstone pancreatitis (not a surgical candidate at this time) with

necrosis


Acute kidney failure now requiring dialysis


Salpingitis


Gallstones (Calculus of gallbladder with acute cholecystitis without obstruc

tion)


HTN 


Leukocytosis 


Hypoxia


Uterine fibroid


Intractable pain


Intractable nausea


Covid 19 negative. 


Acute on chronic anemia 


EEG: No seizure activityFever  - better currently - intermittent could be from 

underlying pancreatitis blood cults 5/4 - neg so far


? Ileus with vomiting


Abd distention - U/S and CT reviewed s/p 0.4 L of opaque, debris-containing 

ascites was removed 5/6


Acute pancreatitis with persistent necrosis


- 4/27 status post ROBERT drain placement + C paropsilosis. s/p additional drains 

5/8


Anemia - S/p PRBCs


Cholelithiasis with thickening of the gallbladder wall.


Leucocytosis improving


JED, hyperkalemia, Metabolic acidosis off dialysis


Acute hypoxic resp failure ,bilateral pleural effusion and atelectasis


hypocalcemia 


Prediabetes


HTN


s/p trach


ESRD on HD


Hyperglycemia





History of Present Illness


History of Present Illness


6/3/2020


Patient seen and examined in the ICU


She remains critically ill


We have been trying to hold off on her Ativan for the past 24 hours


She is a little more awake but shaky and agitated and anxious seems depressed


Discussed with RN


Chart reviewed








6/2/2020


Patient seen and examined in the ICU


She is a little more alert today but still quite ill


Appears clammy and pale and depressed/anxious


Discussed with RN


Chart reviewed











6/1/2020


Patient seen and examined in the ICU


She appears extremely ill


She is tachypneic at 35 respirations per minute and tachycardic at 132 bpm


She is extremely encephalopathic and shaky


She appears clammy


Chart reviewed


Discussed with RN


Prognosis extremely guarded at best








5/31/2020


Patient still in ICU


Resting with no apparent distress


Chart reviewed








5/30/2020


Patient seen and examined in the ICU


She is wiping her face with a cough


Discussed with RN


Chart reviewed


We hope to get her out of the ICU later today if possible








5/29/2020


Patient seen and examined in the ICU once again


She is back on NG suction


On IV Zosyn


Has IV TPN


Sedated with Precedex but anxious still


Appears somewhat clammy and pale


Chart reviewed


Discussed with RN


She remains critically ill














5/28/2020


Patient seen and examined in the ICU


She has an NG that is clamped we are hoping to start some clear liquids but she 

looks quite ill


She is on IV TPN


Meropenem changed to IV Zosyn (agree)


She has [place] to bedside drainage


She is semi-sedated with Precedex


Chart reviewed


Discussed with RN


She remains critically ill











Seen bedside. Hb 8. She was just a bit hypoxic, had a mucous plug suctioned. 

Able to vocalize well with speaking valve, tells me we are being very hard on 

her and she would like to be drugged back to sleep.  She wants to wake up and 

feel better. On trach shield, 


T max 100.4. afebrile this a.m.





5/26/2020


Patient seen and examined in the ICU


She is up in the chair very frail trying to talk a little shaky


Discussed with RN


Chart reviewed








5/25/2020


Patient seen and examined in the ICU


Patient up in the chair


Having a severe coughing episode with a lot of phlegm coming out of her 

tracheostomy


Discussed with RN


Discussed with physical therapy


Chart reviewed











5/24,.    feels well,  has been out of room in wheelchair


no complaint,    still weak,   some with not wanting to wear her valve on trach


cont other,   may be able to work with speech tomorrow,    ketty OK to 

try, 








5/23,  anxiety is up today,   she dislikes the valve still,    


shower and outside today


.   





5/22 she doesnt want to wear her passy-kristan valve,  discussed str and plan with 

her.


speech following,  needs swallow study,   but needs to wear her valve longer,  


cont current


able to walk some, walker 





5/21  stronger,   better,   


we discussed better oral care


she would like to try swallow study,  wants to try to eat,    speech is 

following








5/20/2020 


She remains in the ICU


sitting up and working with OT,   getting better


if limit pain meds, may do better off the vent, 





Nurses trying to suction her,  that is also improved, 


Chart reviewed


 








5/19/2020


Patient seen and examined in the ICU


She had an episode yesterday of tachycardia and severe agitation we gave her 

some Ativan


After that she seemed to have stroke symptoms but now that the Ativan has wore 

off her stroke symptoms have resolved


 


 


She is on IV meropenem and daptomycin and micafungin


Chart reviewed


Discussed with RN


Patient is still critically ill


 


BRIEF OPERATIVE NOTE


Pre-Op Diagnosis


Pancreatitis with pseudocysts, suspected infection


Post-Op Diagnosis


same


Procedure Performed


CT abdominal Drains x 3


Surgeon


Hardy


Anesthesia Type:  Conscious Sedation


Findings


3 abdominal drains, 14F, with turbid pancreatic fluid and necrotic debris in 

each.


Complications


No immediate








5/9: Patient today somewhat restless and having bilious secretions from ET tube,

imaging studies ordered, discussed with consultant. Pretty poor prognosis, 

hopefully is not a fistula, poor surgical candidate. 





5/10: Imaging with no acute events, she seems more stable today compared to 

yesterday. Encouraged as much activity as possible patient at high risk for 

severe depression.





Vitals/I&O


Vitals/I&O:





                                   Vital Signs








  Date Time  Temp Pulse Resp B/P (MAP) Pulse Ox O2 Delivery O2 Flow Rate FiO2


 


6/3/20 03:00 98.5 128 44 144/88 (106) 96 Room Air  





 98.5       














                                    I & O   


 


 6/2/20 6/2/20 6/3/20





 15:00 23:00 07:00


 


Intake Total 50 ml 531 ml 0 ml


 


Output Total  1050 ml 1200 ml


 


Balance 50 ml -519 ml -1200 ml











Physical Exam


Physical Exam:


GENERAL: Propped up in bed,, weak appearing see above


HEENT:  Oral cavity clear,  NGT


NECK:  Trach present


LUNGS: Clear, nonlabored 


HEART:  S1, S2, regular 


ABDOMEN: mod distention, bowel sounds present, + ROBERT drains x 3


: [place] (4/14)


EXTREMITIES: Generalized edema. no cyanosis


SKIN: Cool clammy and pale


CNS:Opens eyes to voice, very weak 


LUE-PICC (5/29) without signsof complications


General:  Alert, Cooperative, No acute distress


Heart:  Regular rate, Normal S1, Normal S2, No murmurs, Gallops


Lungs:  Other (few rhonchi)


Abdomen:  Soft


Extremities:  No clubbing, No cyanosis, No edema, Normal pulses, No 

tenderness/swelling


Skin:  Other (warm, dry)





Labs


Labs:





Laboratory Tests








Test


 6/2/20


12:22 6/2/20


17:18 6/3/20


00:12 6/3/20


06:28


 


Glucose (Fingerstick)


 169 mg/dL


(70-99) 168 mg/dL


(70-99) 194 mg/dL


(70-99) 216 mg/dL


(70-99)


 


Test


 6/3/20


07:56 


 


 





 


White Blood Count


 12.8 x10^3/uL


(4.0-11.0) 


 


 





 


Red Blood Count


 3.11 x10^6/uL


(3.50-5.40) 


 


 





 


Hemoglobin


 8.5 g/dL


(12.0-15.5) 


 


 





 


Hematocrit


 27.4 %


(36.0-47.0) 


 


 





 


Mean Corpuscular Volume 88 fL ()    


 


Mean Corpuscular Hemoglobin 27 pg (25-35)    


 


Mean Corpuscular Hemoglobin


Concent 31 g/dL


(31-37) 


 


 





 


Red Cell Distribution Width


 20.0 %


(11.5-14.5) 


 


 





 


Platelet Count


 594 x10^3/uL


(140-400) 


 


 





 


Neutrophils (%) (Auto) 74 % (31-73)    


 


Lymphocytes (%) (Auto) 18 % (24-48)    


 


Monocytes (%) (Auto) 7 % (0-9)    


 


Eosinophils (%) (Auto) 1 % (0-3)    


 


Basophils (%) (Auto) 1 % (0-3)    


 


Neutrophils # (Auto)


 9.5 x10^3/uL


(1.8-7.7) 


 


 





 


Lymphocytes # (Auto)


 2.3 x10^3/uL


(1.0-4.8) 


 


 





 


Monocytes # (Auto)


 0.9 x10^3/uL


(0.0-1.1) 


 


 





 


Eosinophils # (Auto)


 0.1 x10^3/uL


(0.0-0.7) 


 


 





 


Basophils # (Auto)


 0.1 x10^3/uL


(0.0-0.2) 


 


 














Assessment and Plan


Assessmemt and Plan


Problems


Medical Problems:


(1) Acute pancreatitis


Status: Acute  





(2) Cholelithiasis


Status: Acute  





Acute hypoxic Respiratory failure requiring mechanical ventilation was initially

intubated on 3/23 was off for couple of days now back on


Severe gallstone pancreatitis (not a surgical candidate at this time) with 

necrosis


Tracheostomy


bilateral pleural effusions/pulm edema 


Severe sepsis


Acute kidney failure now requiring dialysis


Salpingitis


Gallstones (Calculus of gallbladder with acute cholecystitis without 

obstruction)


HTN 


Leukocytosis 


Hypoxia


Uterine fibroid


Intractable pain


Intractable nausea


Covid 19 negative. 


Acute on chronic anemia 


EEG: No seizure activityFever  - better currently - intermittent could be from 

underlying pancreatitis blood cults 5/4 - neg so far


? Ileus with vomiting


Abd distention - U/S and CT reviewed s/p 0.4 L of opaque, debris-containing 

ascites was removed 5/6


Acute pancreatitis with persistent necrosis


- 4/27 status post ROBERT drain placement + C paropsilosis. s/p additional drains 

5/8


Anemia - S/p PRBCs


Cholelithiasis with thickening of the gallbladder wall.


Leucocytosis improving


JED, hyperkalemia, Metabolic acidosis off dialysis


Acute hypoxic resp failure ,bilateral pleural effusion and atelectasis


hypocalcemia 


Prediabetes


HTN


s/p trach


ESRD on HD


Hyperglycemia








Plan


ICU monitoring


Wound care


Trying to minimize sedation but she gets anxious so easily


Humidified O2 via nasal cannula for now but we can use trach shield as backup


NG suctioning


IV Zosyn


Nebulizers


Continue IV antibiotics and micafungin


Sedation with Precedex PRN


TPN protocol


Continue [place] to bedside drainage


Tracheostomy care


Hope to eventually move towards decannulation (we have a speaking valve for now)


Trend labs


Appreciate subspecialist input


Prognosis extremely guarded at best! (she scored a 9 on [place] criteria 5 weeks 

ago).


She is still very critically ill


Total time 31





Comment


Review of Relevant


I have reviewed the following items josy (where applicable) has been applied.


Medications:





Current Medications








 Medications


  (Trade)  Dose


 Ordered  Sig/Yee


 Route


 PRN Reason  Start Time


 Stop Time Status Last Admin


Dose Admin


 


 Potassium


 Chloride 90 meq/


 Magnesium Sulfate


 20 meq/


 Multivitamins 10


 ml/Chromium/


 Copper/Manganese/


 Seleni/Zn 1 ml/


 Insulin Human


 Regular 20 unit/


 Total Parenteral


 Nutrition/Amino


 Acids/Dextrose/


 Fat Emulsion


 Intravenous  1,800 ml @ 


 75 mls/hr  TPN  CONT


 IV


   6/2/20 22:00


 6/3/20 21:59  6/2/20 22:08





 


 Lorazepam


  (Ativan Inj)  0.25 mg  PRN Q4HRS  PRN


 IVP


 ANXIETY / AGITATION  6/3/20 07:30


    6/3/20 07:55














Justicifation of Admission Dx:


Justifications for Admission:


Justification of Admission Dx:  Yes











HECTOR MASON III DO            Quintin 3, 2020 08:19

## 2020-06-03 NOTE — PDOC
PULMONARY PROGRESS NOTES


Subjective


Patient intubated on 3/23 , s/p trach 4/6, has cough


Remains on TS, increase secretions


more alert


Vitals





Vital Signs








  Date Time  Temp Pulse Resp B/P (MAP) Pulse Ox O2 Delivery O2 Flow Rate FiO2


 


6/3/20 10:03     97 Room Air  


 


6/3/20 07:00 98.4 126 38 153/82 (105)    





 98.4       








ROS:  No Chest Pain, No Abdominal Pain, No Increase Cough


General:  Alert, No acute distress


Lungs:  Other (few rhonchi)


Cardiovascular:  S1, S2


Abdomen:  Soft, Non-tender


Neuro Exam:  Alert


Extremities:  Other (+3 generalized edema )


Skin:  Warm, Dry


Labs





Laboratory Tests








Test


 6/1/20


12:14 6/1/20


17:42 6/2/20


00:41 6/2/20


06:12


 


Glucose (Fingerstick)


 197 mg/dL


(70-99) 183 mg/dL


(70-99) 210 mg/dL


(70-99) 





 


Sodium Level


 


 


 


 147 mmol/L


(136-145)


 


Potassium Level


 


 


 


 3.7 mmol/L


(3.5-5.1)


 


Chloride Level


 


 


 


 109 mmol/L


()


 


Carbon Dioxide Level


 


 


 


 29 mmol/L


(21-32)


 


Anion Gap    9 (6-14) 


 


Blood Urea Nitrogen


 


 


 


 37 mg/dL


(7-20)


 


Creatinine


 


 


 


 1.1 mg/dL


(0.6-1.0)


 


Estimated GFR


(Cockcroft-Gault) 


 


 


 52.8 





 


Glucose Level


 


 


 


 163 mg/dL


(70-99)


 


Calcium Level


 


 


 


 10.0 mg/dL


(8.5-10.1)


 


Test


 6/2/20


06:21 6/2/20


12:22 6/2/20


17:18 6/3/20


00:12


 


Glucose (Fingerstick)


 163 mg/dL


(70-99) 169 mg/dL


(70-99) 168 mg/dL


(70-99) 194 mg/dL


(70-99)


 


Test


 6/3/20


06:28 6/3/20


07:56 


 





 


Glucose (Fingerstick)


 216 mg/dL


(70-99) 


 


 





 


White Blood Count


 


 12.8 x10^3/uL


(4.0-11.0) 


 





 


Red Blood Count


 


 3.11 x10^6/uL


(3.50-5.40) 


 





 


Hemoglobin


 


 8.5 g/dL


(12.0-15.5) 


 





 


Hematocrit


 


 27.4 %


(36.0-47.0) 


 





 


Mean Corpuscular Volume  88 fL ()   


 


Mean Corpuscular Hemoglobin  27 pg (25-35)   


 


Mean Corpuscular Hemoglobin


Concent 


 31 g/dL


(31-37) 


 





 


Red Cell Distribution Width


 


 20.0 %


(11.5-14.5) 


 





 


Platelet Count


 


 594 x10^3/uL


(140-400) 


 





 


Neutrophils (%) (Auto)  74 % (31-73)   


 


Lymphocytes (%) (Auto)  18 % (24-48)   


 


Monocytes (%) (Auto)  7 % (0-9)   


 


Eosinophils (%) (Auto)  1 % (0-3)   


 


Basophils (%) (Auto)  1 % (0-3)   


 


Neutrophils # (Auto)


 


 9.5 x10^3/uL


(1.8-7.7) 


 





 


Lymphocytes # (Auto)


 


 2.3 x10^3/uL


(1.0-4.8) 


 





 


Monocytes # (Auto)


 


 0.9 x10^3/uL


(0.0-1.1) 


 





 


Eosinophils # (Auto)


 


 0.1 x10^3/uL


(0.0-0.7) 


 





 


Basophils # (Auto)


 


 0.1 x10^3/uL


(0.0-0.2) 


 











Laboratory Tests








Test


 6/2/20


12:22 6/2/20


17:18 6/3/20


00:12 6/3/20


06:28


 


Glucose (Fingerstick)


 169 mg/dL


(70-99) 168 mg/dL


(70-99) 194 mg/dL


(70-99) 216 mg/dL


(70-99)


 


Test


 6/3/20


07:56 


 


 





 


White Blood Count


 12.8 x10^3/uL


(4.0-11.0) 


 


 





 


Red Blood Count


 3.11 x10^6/uL


(3.50-5.40) 


 


 





 


Hemoglobin


 8.5 g/dL


(12.0-15.5) 


 


 





 


Hematocrit


 27.4 %


(36.0-47.0) 


 


 





 


Mean Corpuscular Volume 88 fL ()    


 


Mean Corpuscular Hemoglobin 27 pg (25-35)    


 


Mean Corpuscular Hemoglobin


Concent 31 g/dL


(31-37) 


 


 





 


Red Cell Distribution Width


 20.0 %


(11.5-14.5) 


 


 





 


Platelet Count


 594 x10^3/uL


(140-400) 


 


 





 


Neutrophils (%) (Auto) 74 % (31-73)    


 


Lymphocytes (%) (Auto) 18 % (24-48)    


 


Monocytes (%) (Auto) 7 % (0-9)    


 


Eosinophils (%) (Auto) 1 % (0-3)    


 


Basophils (%) (Auto) 1 % (0-3)    


 


Neutrophils # (Auto)


 9.5 x10^3/uL


(1.8-7.7) 


 


 





 


Lymphocytes # (Auto)


 2.3 x10^3/uL


(1.0-4.8) 


 


 





 


Monocytes # (Auto)


 0.9 x10^3/uL


(0.0-1.1) 


 


 





 


Eosinophils # (Auto)


 0.1 x10^3/uL


(0.0-0.7) 


 


 





 


Basophils # (Auto)


 0.1 x10^3/uL


(0.0-0.2) 


 


 











Medications





Active Scripts








 Medications  Dose


 Route/Sig


 Max Daily Dose Days Date Category


 


 Bisoprolol


 Fumarate 5 Mg


 Tablet  10 Mg


 PO DAILY


   3/16/20 Reported








Comments


cxr reviewed,





Impression


.


IMPRESSION:


1.  Acute hypoxemic respiratory failure secondary to ARDS status post trach,


2.  Gallstone pancreatitis


3.  Severe metabolic acidosis.stable


4.  Acute kidney injury-stable, Off HD-- continue to improve 


5.  Acute gallstone pancreatitis.


6.  Hypoalbuminemia.


7.  Moderate persistent effusions, s/p left thora  5/12


8.  Fever-  Per ID, per surgery--resolved 


9.  Chronic anemia


10. Covid 19 testing negative


11. Moderate to large ascites-S/P paracentisis


12.S/P paracentisis with 4 liters removed on 4/15/20


13. S/P IR drain placement on 5/8/2020





Plan


.


1.Continue supplemental oxygen via trach shield--  PMV/capping as tolerated/ prn

suction. dc ativan


2. s/p  thoracentesis, 5/12, 3 litres removed


3. Follow surgery recs-- S/P 3 drain placed in IR on 5/8/2020


4. Follow ID recs for ABX


5. Follow nephrology recs 


6. Continue TPN 


DVT/GI PPX: heparin SQ/ protonix 


PT/OT


D/W RN and pt





CODE:FULL











SHARYN SOLORZANO MD                  Quintin 3, 2020 11:17

## 2020-06-03 NOTE — NUR
SS following up with discharge planning. SS reviewed pt chart and discussed with pt RN. Pt 
transferred to room 104. Pt remains on TPN and IV Zosyn. Pt is currently on room air. Pt has 
trach collar. Per RN, trach was cleaned many times today. PT/OT continuing to work with pt 
to help with progress. SS will continue to follow for discharge planning.

## 2020-06-04 VITALS — SYSTOLIC BLOOD PRESSURE: 144 MMHG | DIASTOLIC BLOOD PRESSURE: 96 MMHG

## 2020-06-04 VITALS — SYSTOLIC BLOOD PRESSURE: 128 MMHG | DIASTOLIC BLOOD PRESSURE: 92 MMHG

## 2020-06-04 VITALS — DIASTOLIC BLOOD PRESSURE: 104 MMHG | SYSTOLIC BLOOD PRESSURE: 147 MMHG

## 2020-06-04 VITALS — DIASTOLIC BLOOD PRESSURE: 75 MMHG | SYSTOLIC BLOOD PRESSURE: 128 MMHG

## 2020-06-04 VITALS — DIASTOLIC BLOOD PRESSURE: 87 MMHG | SYSTOLIC BLOOD PRESSURE: 125 MMHG

## 2020-06-04 VITALS — DIASTOLIC BLOOD PRESSURE: 68 MMHG | SYSTOLIC BLOOD PRESSURE: 137 MMHG

## 2020-06-04 VITALS — SYSTOLIC BLOOD PRESSURE: 164 MMHG | DIASTOLIC BLOOD PRESSURE: 88 MMHG

## 2020-06-04 VITALS — SYSTOLIC BLOOD PRESSURE: 146 MMHG | DIASTOLIC BLOOD PRESSURE: 91 MMHG

## 2020-06-04 LAB
ANION GAP SERPL CALC-SCNC: 11 MMOL/L (ref 6–14)
BASOPHILS # BLD AUTO: 0 X10^3/UL (ref 0–0.2)
BASOPHILS NFR BLD: 0 % (ref 0–3)
BUN SERPL-MCNC: 42 MG/DL (ref 7–20)
CALCIUM SERPL-MCNC: 10.5 MG/DL (ref 8.5–10.1)
CHLORIDE SERPL-SCNC: 113 MMOL/L (ref 98–107)
CO2 SERPL-SCNC: 26 MMOL/L (ref 21–32)
CREAT SERPL-MCNC: 1.2 MG/DL (ref 0.6–1)
EOSINOPHIL NFR BLD: 0.2 X10^3/UL (ref 0–0.7)
EOSINOPHIL NFR BLD: 1 % (ref 0–3)
ERYTHROCYTE [DISTWIDTH] IN BLOOD BY AUTOMATED COUNT: 20.2 % (ref 11.5–14.5)
GFR SERPLBLD BASED ON 1.73 SQ M-ARVRAT: 47.7 ML/MIN
GLUCOSE SERPL-MCNC: 154 MG/DL (ref 70–99)
HCT VFR BLD CALC: 24.8 % (ref 36–47)
HGB BLD-MCNC: 7.7 G/DL (ref 12–15.5)
LYMPHOCYTES # BLD: 2.1 X10^3/UL (ref 1–4.8)
LYMPHOCYTES NFR BLD AUTO: 17 % (ref 24–48)
MAGNESIUM SERPL-MCNC: 2.5 MG/DL (ref 1.8–2.4)
MCH RBC QN AUTO: 28 PG (ref 25–35)
MCHC RBC AUTO-ENTMCNC: 31 G/DL (ref 31–37)
MCV RBC AUTO: 89 FL (ref 79–100)
MONO #: 1 X10^3/UL (ref 0–1.1)
MONOCYTES NFR BLD: 8 % (ref 0–9)
NEUT #: 9 X10^3/UL (ref 1.8–7.7)
NEUTROPHILS NFR BLD AUTO: 73 % (ref 31–73)
PHOSPHATE SERPL-MCNC: 3 MG/DL (ref 2.6–4.7)
PLATELET # BLD AUTO: 551 X10^3/UL (ref 140–400)
POTASSIUM SERPL-SCNC: 3.4 MMOL/L (ref 3.5–5.1)
POTASSIUM SERPL-SCNC: 3.4 MMOL/L (ref 3.5–5.1)
RBC # BLD AUTO: 2.8 X10^6/UL (ref 3.5–5.4)
SODIUM SERPL-SCNC: 150 MMOL/L (ref 136–145)
TRIGL SERPL-MCNC: 449 MG/DL (ref 0–150)
WBC # BLD AUTO: 12.3 X10^3/UL (ref 4–11)

## 2020-06-04 RX ADMIN — Medication PRN EACH: at 13:31

## 2020-06-04 RX ADMIN — INSULIN LISPRO SCH UNITS: 100 INJECTION, SOLUTION INTRAVENOUS; SUBCUTANEOUS at 12:56

## 2020-06-04 RX ADMIN — PANTOPRAZOLE SODIUM SCH MG: 40 INJECTION, POWDER, FOR SOLUTION INTRAVENOUS at 08:38

## 2020-06-04 RX ADMIN — FUROSEMIDE SCH MG: 10 INJECTION, SOLUTION INTRAMUSCULAR; INTRAVENOUS at 08:38

## 2020-06-04 RX ADMIN — INSULIN LISPRO SCH UNITS: 100 INJECTION, SOLUTION INTRAVENOUS; SUBCUTANEOUS at 18:00

## 2020-06-04 RX ADMIN — INSULIN LISPRO SCH UNITS: 100 INJECTION, SOLUTION INTRAVENOUS; SUBCUTANEOUS at 06:00

## 2020-06-04 RX ADMIN — FENTANYL SCH PATCH: 50 PATCH, EXTENDED RELEASE TRANSDERMAL at 21:22

## 2020-06-04 RX ADMIN — FENTANYL CITRATE PRN MCG: 50 INJECTION INTRAMUSCULAR; INTRAVENOUS at 08:39

## 2020-06-04 RX ADMIN — FENTANYL CITRATE PRN MCG: 50 INJECTION INTRAMUSCULAR; INTRAVENOUS at 18:04

## 2020-06-04 RX ADMIN — PIPERACILLIN SODIUM AND TAZOBACTAM SODIUM SCH MLS/HR: 3; .375 INJECTION, POWDER, LYOPHILIZED, FOR SOLUTION INTRAVENOUS at 06:10

## 2020-06-04 RX ADMIN — INSULIN LISPRO SCH UNITS: 100 INJECTION, SOLUTION INTRAVENOUS; SUBCUTANEOUS at 02:28

## 2020-06-04 RX ADMIN — ENOXAPARIN SODIUM SCH MG: 40 INJECTION SUBCUTANEOUS at 18:03

## 2020-06-04 NOTE — PDOC
[facility] PROGRESS NOTE


Chief Complaint


Chief Complaint


Acute hypoxic Respiratory failure requiring mechanical ventilation (now 

extubated for several days but still with tracheostomy)


Tracheostomy


bilateral pleural effusions/pulm edema 


Sepsis


Severe Acute gallstone pancreatitis (not a surgical candidate at this time) with

necrosis


Acute kidney failure now requiring dialysis


Salpingitis


Gallstones (Calculus of gallbladder with acute cholecystitis without obstruc

tion)


HTN 


Leukocytosis 


Hypoxia


Uterine fibroid


Intractable pain


Intractable nausea


Covid 19 negative. 


Acute on chronic anemia 


EEG: No seizure activityFever  - better currently - intermittent could be from 

underlying pancreatitis blood cults 5/4 - neg so far


? Ileus with vomiting


Abd distention - U/S and CT reviewed s/p 0.4 L of opaque, debris-containing 

ascites was removed 5/6


Acute pancreatitis with persistent necrosis


- 4/27 status post ROBERT drain placement + C paropsilosis. s/p additional drains 

5/8


Anemia - S/p PRBCs


Cholelithiasis with thickening of the gallbladder wall.


Leucocytosis improving


JED, hyperkalemia, Metabolic acidosis off dialysis


Acute hypoxic resp failure ,bilateral pleural effusion and atelectasis


hypocalcemia 


Prediabetes


HTN


s/p trach


ESRD on HD


Hyperglycemia





History of Present Illness


History of Present Illness


6/4/2020


Patient seen and examined in the ICU


She remains critically ill is is extremely weak


Chart reviewed


Discussed with RN


We decided to try some Prozac as she does seem depressed


On IV TPN


Still has NG tube








6/3/2020


Patient seen and examined in the ICU


She remains critically ill


We have been trying to hold off on her Ativan for the past 24 hours


She is a little more awake but shaky and agitated and anxious seems depressed


Discussed with RN


Chart reviewed








6/2/2020


Patient seen and examined in the ICU


She is a little more alert today but still quite ill


Appears clammy and pale and depressed/anxious


Discussed with RN


Chart reviewed











6/1/2020


Patient seen and examined in the ICU


She appears extremely ill


She is tachypneic at 35 respirations per minute and tachycardic at 132 bpm


She is extremely encephalopathic and shaky


She appears clammy


Chart reviewed


Discussed with RN


Prognosis extremely guarded at best








5/31/2020


Patient still in ICU


Resting with no apparent distress


Chart reviewed








5/30/2020


Patient seen and examined in the ICU


She is wiping her face with a cough


Discussed with RN


Chart reviewed


We hope to get her out of the ICU later today if possible








5/29/2020


Patient seen and examined in the ICU once again


She is back on NG suction


On IV Zosyn


Has IV TPN


Sedated with Precedex but anxious still


Appears somewhat clammy and pale


Chart reviewed


Discussed with RN


She remains critically ill














5/28/2020


Patient seen and examined in the ICU


She has an NG that is clamped we are hoping to start some clear liquids but she 

looks quite ill


She is on IV TPN


Meropenem changed to IV Zosyn (agree)


She has [place] to bedside drainage


She is semi-sedated with Precedex


Chart reviewed


Discussed with RN


She remains critically ill











Seen bedside. Hb 8. She was just a bit hypoxic, had a mucous plug suctioned. 

Able to vocalize well with speaking valve, tells me we are being very hard on 

her and she would like to be drugged back to sleep.  She wants to wake up and 

feel better. On trach shield, 


T max 100.4. afebrile this a.m.





5/26/2020


Patient seen and examined in the ICU


She is up in the chair very frail trying to talk a little shaky


Discussed with RN


Chart reviewed








5/25/2020


Patient seen and examined in the ICU


Patient up in the chair


Having a severe coughing episode with a lot of phlegm coming out of her 

tracheostomy


Discussed with RN


Discussed with physical therapy


Chart reviewed











5/24,.    feels well,  has been out of room in wheelchair


no complaint,    still weak,   some with not wanting to wear her valve on trach


cont other,   may be able to work with speech tomorrow,    kaliwallow OK to 

try, 








5/23,  anxiety is up today,   she dislikes the valve still,    


shower and outside today


.   





5/22 she doesnt want to wear her passy-kristan valve,  discussed str and plan with 

her.


speech following,  needs swallow study,   but needs to wear her valve longer,  


cont current


able to walk some, walker 





5/21  stronger,   better,   


we discussed better oral care


she would like to try swallow study,  wants to try to eat,    speech is 

following








5/20/2020 


She remains in the ICU


sitting up and working with OT,   getting better


if limit pain meds, may do better off the vent, 





Nurses trying to suction her,  that is also improved, 


Chart reviewed


 








5/19/2020


Patient seen and examined in the ICU


She had an episode yesterday of tachycardia and severe agitation we gave her 

some Ativan


After that she seemed to have stroke symptoms but now that the Ativan has wore 

off her stroke symptoms have resolved


 


 


She is on IV meropenem and daptomycin and micafungin


Chart reviewed


Discussed with RN


Patient is still critically ill


 


BRIEF OPERATIVE NOTE


Pre-Op Diagnosis


Pancreatitis with pseudocysts, suspected infection


Post-Op Diagnosis


same


Procedure Performed


CT abdominal Drains x 3


Surgeon


Hardy


Anesthesia Type:  Conscious Sedation


Findings


3 abdominal drains, 14F, with turbid pancreatic fluid and necrotic debris in 

each.


Complications


No immediate








5/9: Patient today somewhat restless and having bilious secretions from ET tube,

imaging studies ordered, discussed with consultant. Pretty poor prognosis, 

hopefully is not a fistula, poor surgical candidate. 





5/10: Imaging with no acute events, she seems more stable today compared to 

yesterday. Encouraged as much activity as possible patient at high risk for 

severe depression.





Vitals/I&O


Vitals/I&O:





                                   Vital Signs








  Date Time  Temp Pulse Resp B/P (MAP) Pulse Ox O2 Delivery O2 Flow Rate FiO2


 


6/4/20 09:10   24  96 Room Air  


 


6/4/20 07:00 99.2 112  128/75 (92)    





 99.2       


 


6/3/20 14:04       10.0 














                                    I & O   


 


 6/3/20 6/3/20 6/4/20





 15:00 23:00 07:00


 


Intake Total  10 ml 


 


Output Total 450 ml 645 ml 285 ml


 


Balance -450 ml -635 ml -285 ml











Physical Exam


Physical Exam:


GENERAL: Propped up in bed,, weak appearing see above


HEENT:  Oral cavity clear,  NGT


NECK:  Trach present


LUNGS: Clear, nonlabored 


HEART:  S1, S2, regular 


ABDOMEN: mod distention, bowel sounds present, 


: [place] (4/14)


EXTREMITIES: Generalized edema. no cyanosis


SKIN: Cool clammy and pale


CNS:Opens eyes to voice, very weak 


LUE-PICC (5/29) without signsof complications


General:  Alert, Cooperative, mild distress


Heart:  Regular rate, Normal S1, Normal S2, No murmurs, Gallops


Lungs:  Other (diminshed in bases )


Abdomen:  Normal bowel sounds, Soft, No tenderness, Other (Wounds clean dry and 

intact)


Extremities:  No clubbing, No cyanosis, No edema, Normal pulses, No 

tenderness/swelling


Skin:  Other (warm, dry)





Labs


Labs:





Laboratory Tests








Test


 6/3/20


12:57 6/3/20


18:20 6/4/20


02:21 6/4/20


02:24


 


Glucose (Fingerstick)


 233 mg/dL


(70-99) 166 mg/dL


(70-99) 529 mg/dL


(70-99) 182 mg/dL


(70-99)


 


Test


 6/4/20


06:20 


 


 





 


White Blood Count


 12.3 x10^3/uL


(4.0-11.0) 


 


 





 


Red Blood Count


 2.80 x10^6/uL


(3.50-5.40) 


 


 





 


Hemoglobin


 7.7 g/dL


(12.0-15.5) 


 


 





 


Hematocrit


 24.8 %


(36.0-47.0) 


 


 





 


Mean Corpuscular Volume 89 fL ()    


 


Mean Corpuscular Hemoglobin 28 pg (25-35)    


 


Mean Corpuscular Hemoglobin


Concent 31 g/dL


(31-37) 


 


 





 


Red Cell Distribution Width


 20.2 %


(11.5-14.5) 


 


 





 


Platelet Count


 551 x10^3/uL


(140-400) 


 


 





 


Neutrophils (%) (Auto) 73 % (31-73)    


 


Lymphocytes (%) (Auto) 17 % (24-48)    


 


Monocytes (%) (Auto) 8 % (0-9)    


 


Eosinophils (%) (Auto) 1 % (0-3)    


 


Basophils (%) (Auto) 0 % (0-3)    


 


Neutrophils # (Auto)


 9.0 x10^3/uL


(1.8-7.7) 


 


 





 


Lymphocytes # (Auto)


 2.1 x10^3/uL


(1.0-4.8) 


 


 





 


Monocytes # (Auto)


 1.0 x10^3/uL


(0.0-1.1) 


 


 





 


Eosinophils # (Auto)


 0.2 x10^3/uL


(0.0-0.7) 


 


 





 


Basophils # (Auto)


 0.0 x10^3/uL


(0.0-0.2) 


 


 





 


Sodium Level


 150 mmol/L


(136-145) 


 


 





 


Potassium Level


 3.4 mmol/L


(3.5-5.1) 


 


 





 


Chloride Level


 113 mmol/L


() 


 


 





 


Carbon Dioxide Level


 26 mmol/L


(21-32) 


 


 





 


Anion Gap 11 (6-14)    


 


Blood Urea Nitrogen


 42 mg/dL


(7-20) 


 


 





 


Creatinine


 1.2 mg/dL


(0.6-1.0) 


 


 





 


Estimated GFR


(Cockcroft-Gault) 47.7 


 


 


 





 


Glucose Level


 154 mg/dL


(70-99) 


 


 





 


Glucose (Fingerstick)


 147 mg/dL


(70-99) 


 


 





 


Calcium Level


 10.5 mg/dL


(8.5-10.1) 


 


 





 


Phosphorus Level


 3.0 mg/dL


(2.6-4.7) 


 


 





 


Magnesium Level


 2.5 mg/dL


(1.8-2.4) 


 


 





 


Triglycerides Level


 449 mg/dL


(0-150) 


 


 














Assessment and Plan


Assessmemt and Plan


Problems


Medical Problems:


(1) Acute pancreatitis


Status: Acute  





(2) Cholelithiasis


Status: Acute  





Acute hypoxic Respiratory failure requiring mechanical ventilation was initially

intubated on 3/23 was off for couple of days now back on


Severe gallstone pancreatitis (not a surgical candidate at this time) with nec

rosis


Tracheostomy


bilateral pleural effusions/pulm edema 


Severe sepsis


Acute kidney failure now requiring dialysis


Salpingitis


Gallstones (Calculus of gallbladder with acute cholecystitis without 

obstruction)


HTN 


Leukocytosis 


Hypoxia


Uterine fibroid


Intractable pain


Intractable nausea


Covid 19 negative. 


Acute on chronic anemia 


EEG: No seizure activityFever  - better currently - intermittent could be from 

underlying pancreatitis blood cults 5/4 - neg so far


? Ileus with vomiting


Abd distention - U/S and CT reviewed s/p 0.4 L of opaque, debris-containing 

ascites was removed 5/6


Acute pancreatitis with persistent necrosis


- 4/27 status post ROBERT drain placement + C paropsilosis. s/p additional drains 

5/8


Anemia - S/p PRBCs


Cholelithiasis with thickening of the gallbladder wall.


Leucocytosis improving


JED, hyperkalemia, Metabolic acidosis off dialysis


Acute hypoxic resp failure ,bilateral pleural effusion and atelectasis


hypocalcemia 


Prediabetes


HTN


s/p trach


ESRD on HD


Hyperglycemia








Plan


ICU monitoring


Wound care


Trying to minimize sedation but she gets anxious so easily


Humidified O2 via nasal cannula for now but we can use trach shield as backup


NG suctioning


IV Zosyn


Nebulizers


TPN protocol


Continue [place] to bedside drainage


Tracheostomy care


Hope to eventually move towards decannulation (we have a speaking valve for now)


Trend labs


Add Prozac 20 mg per NG daily


Appreciate subspecialist input


Prognosis extremely guarded at best! (she scored a 9 on [place] criteria 5 weeks 

ago).


She is still very critically ill (perhaps 1 or 2% improvement overnight)


Total time 32





Comment


Review of Relevant


I have reviewed the following items josy (where applicable) has been applied.


Medications:





Current Medications








 Medications


  (Trade)  Dose


 Ordered  Sig/Yee


 Route


 PRN Reason  Start Time


 Stop Time Status Last Admin


Dose Admin


 


 Potassium


 Chloride 90 meq/


 Magnesium Sulfate


 20 meq/


 Multivitamins 10


 ml/Chromium/


 Copper/Manganese/


 Seleni/Zn 1 ml/


 Insulin Human


 Regular 20 unit/


 Total Parenteral


 Nutrition/Amino


 Acids/Dextrose/


 Fat Emulsion


 Intravenous  1,800 ml @ 


 75 mls/hr  TPN  CONT


 IV


   6/3/20 22:00


 6/4/20 21:59  6/3/20 23:13





 


 Furosemide


  (Lasix)  40 mg  DAILY


 IVP


   6/3/20 13:30


    6/4/20 08:38














Justicifation of Admission Dx:


Justifications for Admission:


Justification of Admission Dx:  Yes











HECTOR MASON III DO            Jun 4, 2020 11:07

## 2020-06-04 NOTE — NUR
Pharmacy TPN Dosing Note



S: JESENIA BEAN is a 49 year old F Currently receiving Central Continuous TPN started 
03/18/20



B:Pertinent PMH: Necrotizing pancreatitis

Height: 5 feet, 8 inches

Weight: 68.5 kg



Current diet: NPO 



LABS:

Sodium:    150 

Potassium: 3.4 

Chloride:  113 

Calcium:   10.5 

Corrected Calcium: 12.10 

Magnesium: 2.5 

CO2:       26 

SCr:       1.2 

Glucose:   147-182 

Albumin:   2.0 

AST:       15 

ALT:       14 



TPN FORMULA:

TPN TYPE:  Central Continuous

AMINO ACIDS:         75 gm

DEXTROSE:            250 gm

LIPIDS:              20 gm



POTASSIUM CHLORIDE:  40 mEq

POTASSIUM ACETATE:   60 mEq

MAGNESIUM:           10 mEq

INSULIN:             20 units

MULTIPLE VITAMIN:    10 ml

TRACE ELEMENTS:      1 ml(s)



TPN PLAN:  

Elevated triglycerides today - lipids decreased to 20g

Potassium and magnesium adjusted in TPN per labs.





R: Change TPN per plan and ordered formula

Will monitor electrolytes, glucose, and tolerance to TPN.



 Maribell Vazquez [facility], 06/04/20 [id number]

## 2020-06-04 NOTE — NUR
Patient request for trach to be cleaned. Cleaned trach with sterile technique. Cleaned thick 
white mucous plug from trach while soaking in trach kit. Placed trach back. Patient seemed 
at ease with having clean trach canula. Resting in bed with call light at hand.

## 2020-06-04 NOTE — PDOC
SURGICAL PROGRESS NOTE


Subjective


Patient resting comfortably no acute changes


Vital Signs





Vital Signs








  Date Time  Temp Pulse Resp B/P (MAP) Pulse Ox O2 Delivery O2 Flow Rate FiO2


 


6/4/20 07:00 99.2 112 24 128/75 (92) 95 Room Air  





 99.2       


 


6/3/20 14:04       10.0 








I&O











Intake and Output 


 


 6/4/20





 07:00


 


Intake Total 10 ml


 


Output Total 1380 ml


 


Balance -1370 ml


 


 


 


Intake Oral 10 ml


 


Output Urine Total 1380 ml


 


# Bowel Movements 2








PATIENT HAS A VILLASENOR:  Yes


General:  Alert, Cooperative, mild distress


Abdomen:  Normal bowel sounds, Soft, No tenderness, Other (Wounds clean dry and 

intact)


Labs





Laboratory Tests








Test


 6/2/20


12:22 6/2/20


17:18 6/3/20


00:12 6/3/20


06:28


 


Glucose (Fingerstick)


 169 mg/dL


(70-99) 168 mg/dL


(70-99) 194 mg/dL


(70-99) 216 mg/dL


(70-99)


 


Test


 6/3/20


07:56 6/3/20


12:57 6/3/20


18:20 6/4/20


02:21


 


White Blood Count


 12.8 x10^3/uL


(4.0-11.0) 


 


 





 


Red Blood Count


 3.11 x10^6/uL


(3.50-5.40) 


 


 





 


Hemoglobin


 8.5 g/dL


(12.0-15.5) 


 


 





 


Hematocrit


 27.4 %


(36.0-47.0) 


 


 





 


Mean Corpuscular Volume 88 fL ()    


 


Mean Corpuscular Hemoglobin 27 pg (25-35)    


 


Mean Corpuscular Hemoglobin


Concent 31 g/dL


(31-37) 


 


 





 


Red Cell Distribution Width


 20.0 %


(11.5-14.5) 


 


 





 


Platelet Count


 594 x10^3/uL


(140-400) 


 


 





 


Neutrophils (%) (Auto) 74 % (31-73)    


 


Lymphocytes (%) (Auto) 18 % (24-48)    


 


Monocytes (%) (Auto) 7 % (0-9)    


 


Eosinophils (%) (Auto) 1 % (0-3)    


 


Basophils (%) (Auto) 1 % (0-3)    


 


Neutrophils # (Auto)


 9.5 x10^3/uL


(1.8-7.7) 


 


 





 


Lymphocytes # (Auto)


 2.3 x10^3/uL


(1.0-4.8) 


 


 





 


Monocytes # (Auto)


 0.9 x10^3/uL


(0.0-1.1) 


 


 





 


Eosinophils # (Auto)


 0.1 x10^3/uL


(0.0-0.7) 


 


 





 


Basophils # (Auto)


 0.1 x10^3/uL


(0.0-0.2) 


 


 





 


Sodium Level


 148 mmol/L


(136-145) 


 


 





 


Potassium Level


 3.4 mmol/L


(3.5-5.1) 


 


 





 


Chloride Level


 111 mmol/L


() 


 


 





 


Carbon Dioxide Level


 27 mmol/L


(21-32) 


 


 





 


Anion Gap 10 (6-14)    


 


Blood Urea Nitrogen


 39 mg/dL


(7-20) 


 


 





 


Creatinine


 1.2 mg/dL


(0.6-1.0) 


 


 





 


Estimated GFR


(Cockcroft-Gault) 47.7 


 


 


 





 


Glucose Level


 208 mg/dL


(70-99) 


 


 





 


Calcium Level


 10.6 mg/dL


(8.5-10.1) 


 


 





 


Glucose (Fingerstick)


 


 233 mg/dL


(70-99) 166 mg/dL


(70-99) 529 mg/dL


(70-99)


 


Test


 6/4/20


02:24 6/4/20


06:20 


 





 


Glucose (Fingerstick)


 182 mg/dL


(70-99) 147 mg/dL


(70-99) 


 





 


White Blood Count


 


 12.3 x10^3/uL


(4.0-11.0) 


 





 


Red Blood Count


 


 2.80 x10^6/uL


(3.50-5.40) 


 





 


Hemoglobin


 


 7.7 g/dL


(12.0-15.5) 


 





 


Hematocrit


 


 24.8 %


(36.0-47.0) 


 





 


Mean Corpuscular Volume  89 fL ()   


 


Mean Corpuscular Hemoglobin  28 pg (25-35)   


 


Mean Corpuscular Hemoglobin


Concent 


 31 g/dL


(31-37) 


 





 


Red Cell Distribution Width


 


 20.2 %


(11.5-14.5) 


 





 


Platelet Count


 


 551 x10^3/uL


(140-400) 


 





 


Neutrophils (%) (Auto)  73 % (31-73)   


 


Lymphocytes (%) (Auto)  17 % (24-48)   


 


Monocytes (%) (Auto)  8 % (0-9)   


 


Eosinophils (%) (Auto)  1 % (0-3)   


 


Basophils (%) (Auto)  0 % (0-3)   


 


Neutrophils # (Auto)


 


 9.0 x10^3/uL


(1.8-7.7) 


 





 


Lymphocytes # (Auto)


 


 2.1 x10^3/uL


(1.0-4.8) 


 





 


Monocytes # (Auto)


 


 1.0 x10^3/uL


(0.0-1.1) 


 





 


Eosinophils # (Auto)


 


 0.2 x10^3/uL


(0.0-0.7) 


 





 


Basophils # (Auto)


 


 0.0 x10^3/uL


(0.0-0.2) 


 





 


Potassium Level


 


 3.4 mmol/L


(3.5-5.1) 


 





 


Phosphorus Level


 


 3.0 mg/dL


(2.6-4.7) 


 





 


Magnesium Level


 


 2.5 mg/dL


(1.8-2.4) 


 





 


Triglycerides Level


 


 449 mg/dL


(0-150) 


 











Laboratory Tests








Test


 6/3/20


12:57 6/3/20


18:20 6/4/20


02:21 6/4/20


02:24


 


Glucose (Fingerstick)


 233 mg/dL


(70-99) 166 mg/dL


(70-99) 529 mg/dL


(70-99) 182 mg/dL


(70-99)


 


Test


 6/4/20


06:20 


 


 





 


White Blood Count


 12.3 x10^3/uL


(4.0-11.0) 


 


 





 


Red Blood Count


 2.80 x10^6/uL


(3.50-5.40) 


 


 





 


Hemoglobin


 7.7 g/dL


(12.0-15.5) 


 


 





 


Hematocrit


 24.8 %


(36.0-47.0) 


 


 





 


Mean Corpuscular Volume 89 fL ()    


 


Mean Corpuscular Hemoglobin 28 pg (25-35)    


 


Mean Corpuscular Hemoglobin


Concent 31 g/dL


(31-37) 


 


 





 


Red Cell Distribution Width


 20.2 %


(11.5-14.5) 


 


 





 


Platelet Count


 551 x10^3/uL


(140-400) 


 


 





 


Neutrophils (%) (Auto) 73 % (31-73)    


 


Lymphocytes (%) (Auto) 17 % (24-48)    


 


Monocytes (%) (Auto) 8 % (0-9)    


 


Eosinophils (%) (Auto) 1 % (0-3)    


 


Basophils (%) (Auto) 0 % (0-3)    


 


Neutrophils # (Auto)


 9.0 x10^3/uL


(1.8-7.7) 


 


 





 


Lymphocytes # (Auto)


 2.1 x10^3/uL


(1.0-4.8) 


 


 





 


Monocytes # (Auto)


 1.0 x10^3/uL


(0.0-1.1) 


 


 





 


Eosinophils # (Auto)


 0.2 x10^3/uL


(0.0-0.7) 


 


 





 


Basophils # (Auto)


 0.0 x10^3/uL


(0.0-0.2) 


 


 





 


Potassium Level


 3.4 mmol/L


(3.5-5.1) 


 


 





 


Glucose (Fingerstick)


 147 mg/dL


(70-99) 


 


 





 


Phosphorus Level


 3.0 mg/dL


(2.6-4.7) 


 


 





 


Magnesium Level


 2.5 mg/dL


(1.8-2.4) 


 


 





 


Triglycerides Level


 449 mg/dL


(0-150) 


 


 











Problem List


Problems


Medical Problems:


(1) Acute pancreatitis


Status: Acute  





(2) Cholelithiasis


Status: Acute  








Assessment/Plan


Necrotizing pancreatitis


No acute changes transferred to intensive care unit for logistics


Continue supportive care





Justicifation of Admission Dx:


Justifications for Admission:


Justification of Admission Dx:  Yes











OVIDIO MEDINA MD              Jun 4, 2020 08:06

## 2020-06-04 NOTE — NUR
RN discussed patient's depressed, un-motivated state with Dr. Franco. Order received to 
start Prozac, Fentanyl patch. Will discuss with surgery, GI topic of TF being started. 
Patient will not keep speaking valve on long enough for thickened liquids. RN has educated 
patient on need to wear valve and  multiple times. Patient removes it as soon as it 
is placed on. 



Patient is currently up in recliner w/ assistance from PT/OT. Did not want to get out of 
bed. RN will have patient walk again later in shift.

## 2020-06-04 NOTE — PDOC
Infectious Disease Note


Subjective


Subjective


awake in bed, 


nodes that she is ok





ROS


ROS


no n/v/d/sob





Vital Sign


Vital Signs





Vital Signs








  Date Time  Temp Pulse Resp B/P (MAP) Pulse Ox O2 Delivery O2 Flow Rate FiO2


 


6/4/20 03:00 98.9 124 32 144/96 (112) 97 Room Air  





 98.9       


 


6/3/20 14:04       10.0 











Physical Exam


PHYSICAL EXAM


GENERAL: Propped up in bed,, weak appearing see above


HEENT:  Oral cavity clear,  NGT


NECK:  Trach present


LUNGS: Clear, nonlabored 


HEART:  S1, S2, regular 


ABDOMEN: mod distention, bowel sounds present, 


: [place] (4/14)


EXTREMITIES: Generalized edema. no cyanosis


SKIN: Cool clammy and pale


CNS:Opens eyes to voice, very weak 


LUE-PICC (5/29) without signsof complications





Labs


Lab





Laboratory Tests








Test


 6/3/20


07:56 6/3/20


12:57 6/3/20


18:20 6/4/20


02:21


 


White Blood Count


 12.8 x10^3/uL


(4.0-11.0) 


 


 





 


Red Blood Count


 3.11 x10^6/uL


(3.50-5.40) 


 


 





 


Hemoglobin


 8.5 g/dL


(12.0-15.5) 


 


 





 


Hematocrit


 27.4 %


(36.0-47.0) 


 


 





 


Mean Corpuscular Volume 88 fL ()    


 


Mean Corpuscular Hemoglobin 27 pg (25-35)    


 


Mean Corpuscular Hemoglobin


Concent 31 g/dL


(31-37) 


 


 





 


Red Cell Distribution Width


 20.0 %


(11.5-14.5) 


 


 





 


Platelet Count


 594 x10^3/uL


(140-400) 


 


 





 


Neutrophils (%) (Auto) 74 % (31-73)    


 


Lymphocytes (%) (Auto) 18 % (24-48)    


 


Monocytes (%) (Auto) 7 % (0-9)    


 


Eosinophils (%) (Auto) 1 % (0-3)    


 


Basophils (%) (Auto) 1 % (0-3)    


 


Neutrophils # (Auto)


 9.5 x10^3/uL


(1.8-7.7) 


 


 





 


Lymphocytes # (Auto)


 2.3 x10^3/uL


(1.0-4.8) 


 


 





 


Monocytes # (Auto)


 0.9 x10^3/uL


(0.0-1.1) 


 


 





 


Eosinophils # (Auto)


 0.1 x10^3/uL


(0.0-0.7) 


 


 





 


Basophils # (Auto)


 0.1 x10^3/uL


(0.0-0.2) 


 


 





 


Sodium Level


 148 mmol/L


(136-145) 


 


 





 


Potassium Level


 3.4 mmol/L


(3.5-5.1) 


 


 





 


Chloride Level


 111 mmol/L


() 


 


 





 


Carbon Dioxide Level


 27 mmol/L


(21-32) 


 


 





 


Anion Gap 10 (6-14)    


 


Blood Urea Nitrogen


 39 mg/dL


(7-20) 


 


 





 


Creatinine


 1.2 mg/dL


(0.6-1.0) 


 


 





 


Estimated GFR


(Cockcroft-Gault) 47.7 


 


 


 





 


Glucose Level


 208 mg/dL


(70-99) 


 


 





 


Calcium Level


 10.6 mg/dL


(8.5-10.1) 


 


 





 


Glucose (Fingerstick)


 


 233 mg/dL


(70-99) 166 mg/dL


(70-99) 529 mg/dL


(70-99)


 


Test


 6/4/20


02:24 6/4/20


06:20 


 





 


Glucose (Fingerstick)


 182 mg/dL


(70-99) 147 mg/dL


(70-99) 


 





 


Potassium Level


 


 3.4 mmol/L


(3.5-5.1) 


 





 


Phosphorus Level


 


 3.0 mg/dL


(2.6-4.7) 


 





 


Magnesium Level


 


 2.5 mg/dL


(1.8-2.4) 


 





 


Triglycerides Level


 


 449 mg/dL


(0-150) 


 











Micro








Objective


Assessment


Fever intermittent could be from underlying pancreatitis vs aspiration 


? Ileus with vomiting


Abd distention - U/S and CT reviewed s/p 0.4 L of opaque, debris-containing 

ascites was removed 5/6


Acute pancreatitis with persistent necrosis


  - 4/27 status post ROBERT drain placement + C paropsilosis; 5/6 + yeast & high 

amylase; s/p additional drains on 5/8


Anemia - S/p PRBCs


Cholelithiasis with thickening of the gallbladder wall.


Leucocytosis improved 


JED, hyperkalemia, Metabolic acidosis off dialysis


Acute hypoxic resp failure ,bilateral pleural effusion and atelectasis


hypocalcemia 


Prediabetes


HTN


s/p trach





Plan


Plan of Care


d/c Zosyn May 27


f/u labs/cath tip culture 


Aggressive pulmonary toilet


Maintain aspiration precaution


Supportive care


PT and OT as tolerated





D/w nursing











LINN FRANZ MD 4, 2020 07:26

## 2020-06-04 NOTE — PDOC
Subjective:


Subjective:


I asked if she was hanging in there - she said "trying."





Objective:


Objective:


D/w nurse - wants narcotics, starting anti-depressant, doesn't seem motivated to

help herself sometimes - could suction herself but on her call light to have 

nurse do it.


Vital Signs:





                                   Vital Signs








  Date Time  Temp Pulse Resp B/P (MAP) Pulse Ox O2 Delivery O2 Flow Rate FiO2


 


6/4/20 08:39   25  95 Room Air  


 


6/4/20 07:00 99.2 112  128/75 (92)    





 99.2       


 


6/3/20 14:04       10.0 








Labs:





Laboratory Tests








Test


 6/3/20


12:57 6/3/20


18:20 6/4/20


02:21 6/4/20


02:24


 


Glucose (Fingerstick) 233 mg/dL  166 mg/dL  529 mg/dL  182 mg/dL 


 


Test


 6/4/20


06:20 


 


 





 


White Blood Count 12.3 x10^3/uL    


 


Red Blood Count 2.80 x10^6/uL    


 


Hemoglobin 7.7 g/dL    


 


Hematocrit 24.8 %    


 


Mean Corpuscular Volume 89 fL    


 


Mean Corpuscular Hemoglobin 28 pg    


 


Mean Corpuscular Hemoglobin


Concent 31 g/dL 


 


 


 





 


Red Cell Distribution Width 20.2 %    


 


Platelet Count 551 x10^3/uL    


 


Neutrophils (%) (Auto) 73 %    


 


Lymphocytes (%) (Auto) 17 %    


 


Monocytes (%) (Auto) 8 %    


 


Eosinophils (%) (Auto) 1 %    


 


Basophils (%) (Auto) 0 %    


 


Neutrophils # (Auto) 9.0 x10^3/uL    


 


Lymphocytes # (Auto) 2.1 x10^3/uL    


 


Monocytes # (Auto) 1.0 x10^3/uL    


 


Eosinophils # (Auto) 0.2 x10^3/uL    


 


Basophils # (Auto) 0.0 x10^3/uL    


 


Sodium Level 150 mmol/L    


 


Potassium Level 3.4 mmol/L    


 


Chloride Level 113 mmol/L    


 


Carbon Dioxide Level 26 mmol/L    


 


Anion Gap 11    


 


Blood Urea Nitrogen 42 mg/dL    


 


Creatinine 1.2 mg/dL    


 


Estimated GFR


(Cockcroft-Gault) 47.7 


 


 


 





 


Glucose Level 154 mg/dL    


 


Glucose (Fingerstick) 147 mg/dL    


 


Calcium Level 10.5 mg/dL    


 


Phosphorus Level 3.0 mg/dL    


 


Magnesium Level 2.5 mg/dL    


 


Triglycerides Level 449 mg/dL    











PE:





GEN: NAD, up to chair


LUNGS: trach/cap


HEART: tachycardic


ABD: S/ND/NT


NEURO/PSYCH: A & O 3





A/P:


Pancreatitis, MOSF





--


Encouraged her to keep trying to get better.





Justicifation of Admission Dx:


Justifications for Admission:


Justification of Admission Dx:  Yes











RAGHAVENDRA MURCIA          Jun 4, 2020 09:55

## 2020-06-04 NOTE — NUR
SS following up with discharge planning. SS reviewed pt chart and discussed with pt RN. Pt 
is currently on room air. PT/OT continue to work with pt. Pt now off IV antibiotics. Pt 
still NPO and refusing to wear cap on trach. Pt remains on TPN. SS will continue to follow 
for discharge planning.

## 2020-06-04 NOTE — NUR
Pt was up in chair and now back in bed with 2 person assist. States she doesn't want to move 
but talked with her about why she needed to and how much better she was now. Moved to room 
202 per bed.

## 2020-06-04 NOTE — NUR
Transferred from ICU to [facility] room 202. Alert. Rolf Pandya, at bedside. Orientated 
patient to room and call light. Resting in bed with call light at hand.

## 2020-06-04 NOTE — PDOC
PULMONARY PROGRESS NOTES


Subjective


Patient intubated on 3/23 , s/p trach 4/6, weak cough, requiring assistance with

secretions 


Remains on TS, Awake and alert in chair today, doesn't like PMV


Vitals





Vital Signs








  Date Time  Temp Pulse Resp B/P (MAP) Pulse Ox O2 Delivery O2 Flow Rate FiO2


 


6/4/20 08:39   25  95 Room Air  


 


6/4/20 07:00 99.2 112  128/75 (92)    





 99.2       


 


6/3/20 14:04       10.0 








ROS:  No Nausea, No Chest Pain, No Abdominal Pain, No Increase Cough


General:  Alert, No acute distress


Lungs:  Other (diminshed in bases )


Cardiovascular:  S1, S2


Abdomen:  Soft, Non-tender


Neuro Exam:  Alert


Extremities:  Other (+1 BLE edema)


Skin:  Warm, Dry


Labs





Laboratory Tests








Test


 6/2/20


12:22 6/2/20


17:18 6/3/20


00:12 6/3/20


06:28


 


Glucose (Fingerstick)


 169 mg/dL


(70-99) 168 mg/dL


(70-99) 194 mg/dL


(70-99) 216 mg/dL


(70-99)


 


Test


 6/3/20


07:56 6/3/20


12:57 6/3/20


18:20 6/4/20


02:21


 


White Blood Count


 12.8 x10^3/uL


(4.0-11.0) 


 


 





 


Red Blood Count


 3.11 x10^6/uL


(3.50-5.40) 


 


 





 


Hemoglobin


 8.5 g/dL


(12.0-15.5) 


 


 





 


Hematocrit


 27.4 %


(36.0-47.0) 


 


 





 


Mean Corpuscular Volume 88 fL ()    


 


Mean Corpuscular Hemoglobin 27 pg (25-35)    


 


Mean Corpuscular Hemoglobin


Concent 31 g/dL


(31-37) 


 


 





 


Red Cell Distribution Width


 20.0 %


(11.5-14.5) 


 


 





 


Platelet Count


 594 x10^3/uL


(140-400) 


 


 





 


Neutrophils (%) (Auto) 74 % (31-73)    


 


Lymphocytes (%) (Auto) 18 % (24-48)    


 


Monocytes (%) (Auto) 7 % (0-9)    


 


Eosinophils (%) (Auto) 1 % (0-3)    


 


Basophils (%) (Auto) 1 % (0-3)    


 


Neutrophils # (Auto)


 9.5 x10^3/uL


(1.8-7.7) 


 


 





 


Lymphocytes # (Auto)


 2.3 x10^3/uL


(1.0-4.8) 


 


 





 


Monocytes # (Auto)


 0.9 x10^3/uL


(0.0-1.1) 


 


 





 


Eosinophils # (Auto)


 0.1 x10^3/uL


(0.0-0.7) 


 


 





 


Basophils # (Auto)


 0.1 x10^3/uL


(0.0-0.2) 


 


 





 


Sodium Level


 148 mmol/L


(136-145) 


 


 





 


Potassium Level


 3.4 mmol/L


(3.5-5.1) 


 


 





 


Chloride Level


 111 mmol/L


() 


 


 





 


Carbon Dioxide Level


 27 mmol/L


(21-32) 


 


 





 


Anion Gap 10 (6-14)    


 


Blood Urea Nitrogen


 39 mg/dL


(7-20) 


 


 





 


Creatinine


 1.2 mg/dL


(0.6-1.0) 


 


 





 


Estimated GFR


(Cockcroft-Gault) 47.7 


 


 


 





 


Glucose Level


 208 mg/dL


(70-99) 


 


 





 


Calcium Level


 10.6 mg/dL


(8.5-10.1) 


 


 





 


Glucose (Fingerstick)


 


 233 mg/dL


(70-99) 166 mg/dL


(70-99) 529 mg/dL


(70-99)


 


Test


 6/4/20


02:24 6/4/20


06:20 


 





 


Glucose (Fingerstick)


 182 mg/dL


(70-99) 147 mg/dL


(70-99) 


 





 


White Blood Count


 


 12.3 x10^3/uL


(4.0-11.0) 


 





 


Red Blood Count


 


 2.80 x10^6/uL


(3.50-5.40) 


 





 


Hemoglobin


 


 7.7 g/dL


(12.0-15.5) 


 





 


Hematocrit


 


 24.8 %


(36.0-47.0) 


 





 


Mean Corpuscular Volume  89 fL ()   


 


Mean Corpuscular Hemoglobin  28 pg (25-35)   


 


Mean Corpuscular Hemoglobin


Concent 


 31 g/dL


(31-37) 


 





 


Red Cell Distribution Width


 


 20.2 %


(11.5-14.5) 


 





 


Platelet Count


 


 551 x10^3/uL


(140-400) 


 





 


Neutrophils (%) (Auto)  73 % (31-73)   


 


Lymphocytes (%) (Auto)  17 % (24-48)   


 


Monocytes (%) (Auto)  8 % (0-9)   


 


Eosinophils (%) (Auto)  1 % (0-3)   


 


Basophils (%) (Auto)  0 % (0-3)   


 


Neutrophils # (Auto)


 


 9.0 x10^3/uL


(1.8-7.7) 


 





 


Lymphocytes # (Auto)


 


 2.1 x10^3/uL


(1.0-4.8) 


 





 


Monocytes # (Auto)


 


 1.0 x10^3/uL


(0.0-1.1) 


 





 


Eosinophils # (Auto)


 


 0.2 x10^3/uL


(0.0-0.7) 


 





 


Basophils # (Auto)


 


 0.0 x10^3/uL


(0.0-0.2) 


 





 


Sodium Level


 


 150 mmol/L


(136-145) 


 





 


Potassium Level


 


 3.4 mmol/L


(3.5-5.1) 


 





 


Chloride Level


 


 113 mmol/L


() 


 





 


Carbon Dioxide Level


 


 26 mmol/L


(21-32) 


 





 


Anion Gap  11 (6-14)   


 


Blood Urea Nitrogen


 


 42 mg/dL


(7-20) 


 





 


Creatinine


 


 1.2 mg/dL


(0.6-1.0) 


 





 


Estimated GFR


(Cockcroft-Gault) 


 47.7 


 


 





 


Glucose Level


 


 154 mg/dL


(70-99) 


 





 


Calcium Level


 


 10.5 mg/dL


(8.5-10.1) 


 





 


Phosphorus Level


 


 3.0 mg/dL


(2.6-4.7) 


 





 


Magnesium Level


 


 2.5 mg/dL


(1.8-2.4) 


 





 


Triglycerides Level


 


 449 mg/dL


(0-150) 


 











Laboratory Tests








Test


 6/3/20


12:57 6/3/20


18:20 6/4/20


02:21 6/4/20


02:24


 


Glucose (Fingerstick)


 233 mg/dL


(70-99) 166 mg/dL


(70-99) 529 mg/dL


(70-99) 182 mg/dL


(70-99)


 


Test


 6/4/20


06:20 


 


 





 


White Blood Count


 12.3 x10^3/uL


(4.0-11.0) 


 


 





 


Red Blood Count


 2.80 x10^6/uL


(3.50-5.40) 


 


 





 


Hemoglobin


 7.7 g/dL


(12.0-15.5) 


 


 





 


Hematocrit


 24.8 %


(36.0-47.0) 


 


 





 


Mean Corpuscular Volume 89 fL ()    


 


Mean Corpuscular Hemoglobin 28 pg (25-35)    


 


Mean Corpuscular Hemoglobin


Concent 31 g/dL


(31-37) 


 


 





 


Red Cell Distribution Width


 20.2 %


(11.5-14.5) 


 


 





 


Platelet Count


 551 x10^3/uL


(140-400) 


 


 





 


Neutrophils (%) (Auto) 73 % (31-73)    


 


Lymphocytes (%) (Auto) 17 % (24-48)    


 


Monocytes (%) (Auto) 8 % (0-9)    


 


Eosinophils (%) (Auto) 1 % (0-3)    


 


Basophils (%) (Auto) 0 % (0-3)    


 


Neutrophils # (Auto)


 9.0 x10^3/uL


(1.8-7.7) 


 


 





 


Lymphocytes # (Auto)


 2.1 x10^3/uL


(1.0-4.8) 


 


 





 


Monocytes # (Auto)


 1.0 x10^3/uL


(0.0-1.1) 


 


 





 


Eosinophils # (Auto)


 0.2 x10^3/uL


(0.0-0.7) 


 


 





 


Basophils # (Auto)


 0.0 x10^3/uL


(0.0-0.2) 


 


 





 


Sodium Level


 150 mmol/L


(136-145) 


 


 





 


Potassium Level


 3.4 mmol/L


(3.5-5.1) 


 


 





 


Chloride Level


 113 mmol/L


() 


 


 





 


Carbon Dioxide Level


 26 mmol/L


(21-32) 


 


 





 


Anion Gap 11 (6-14)    


 


Blood Urea Nitrogen


 42 mg/dL


(7-20) 


 


 





 


Creatinine


 1.2 mg/dL


(0.6-1.0) 


 


 





 


Estimated GFR


(Cockcroft-Gault) 47.7 


 


 


 





 


Glucose Level


 154 mg/dL


(70-99) 


 


 





 


Glucose (Fingerstick)


 147 mg/dL


(70-99) 


 


 





 


Calcium Level


 10.5 mg/dL


(8.5-10.1) 


 


 





 


Phosphorus Level


 3.0 mg/dL


(2.6-4.7) 


 


 





 


Magnesium Level


 2.5 mg/dL


(1.8-2.4) 


 


 





 


Triglycerides Level


 449 mg/dL


(0-150) 


 


 











Medications





Active Scripts








 Medications  Dose


 Route/Sig


 Max Daily Dose Days Date Category


 


 Bisoprolol


 Fumarate 5 Mg


 Tablet  10 Mg


 PO DAILY


   3/16/20 Reported











Impression


.


IMPRESSION:


1.  Acute hypoxemic respiratory failure secondary to ARDS status post trach,


2.  Gallstone pancreatitis


3.  Severe metabolic acidosis.stable


4.  Acute kidney injury-stable, Off HD-- continue to improve 


5.  Acute gallstone pancreatitis.


6.  Hypoalbuminemia.


7.  Moderate persistent effusions, s/p left thora  5/12


8.  Fever-  Per ID, per surgery--resolved 


9.  Chronic anemia


10. Covid 19 testing negative


11. Moderate to large ascites-S/P paracentisis


12.S/P paracentisis with 4 liters removed on 4/15/20


13. S/P IR drain placement on 5/8/2020


14. Depression/Anxiety





Plan


.


1.Continue supplemental oxygen via trach shield--  PMV/capping as tolerated/ prn

suction.


2. s/p  thoracentesis, 5/12, 3 litres removed


3. Follow surgery recs-- S/P 3 drain placed in IR on 5/8/2020


4. Follow ID recs for ABX-- currently off ABX 


5. Follow nephrology recs 


6. Continue TPN 


DVT/GI PPX: lovenox / protonix 


PT/OT


D/W RN and pt





CODE:FULL











SHARYN SOLORZANO MD                  Jun 4, 2020 09:48

## 2020-06-04 NOTE — NUR
Per Dr. Schaeffer, no tube feeding to be started at this time. Will discuss w/ Dr. Flores 
tomorrow. Okay with giving antidepressant via NG tube.

## 2020-06-05 VITALS — SYSTOLIC BLOOD PRESSURE: 113 MMHG | DIASTOLIC BLOOD PRESSURE: 71 MMHG

## 2020-06-05 VITALS — SYSTOLIC BLOOD PRESSURE: 114 MMHG | DIASTOLIC BLOOD PRESSURE: 80 MMHG

## 2020-06-05 VITALS — DIASTOLIC BLOOD PRESSURE: 93 MMHG | SYSTOLIC BLOOD PRESSURE: 143 MMHG

## 2020-06-05 VITALS — SYSTOLIC BLOOD PRESSURE: 135 MMHG | DIASTOLIC BLOOD PRESSURE: 88 MMHG

## 2020-06-05 VITALS — SYSTOLIC BLOOD PRESSURE: 100 MMHG | DIASTOLIC BLOOD PRESSURE: 64 MMHG

## 2020-06-05 VITALS — DIASTOLIC BLOOD PRESSURE: 70 MMHG | SYSTOLIC BLOOD PRESSURE: 103 MMHG

## 2020-06-05 VITALS — SYSTOLIC BLOOD PRESSURE: 92 MMHG | DIASTOLIC BLOOD PRESSURE: 67 MMHG

## 2020-06-05 VITALS — DIASTOLIC BLOOD PRESSURE: 58 MMHG | SYSTOLIC BLOOD PRESSURE: 110 MMHG

## 2020-06-05 VITALS — DIASTOLIC BLOOD PRESSURE: 55 MMHG | SYSTOLIC BLOOD PRESSURE: 81 MMHG

## 2020-06-05 VITALS — DIASTOLIC BLOOD PRESSURE: 60 MMHG | SYSTOLIC BLOOD PRESSURE: 102 MMHG

## 2020-06-05 VITALS — DIASTOLIC BLOOD PRESSURE: 61 MMHG | SYSTOLIC BLOOD PRESSURE: 97 MMHG

## 2020-06-05 LAB
ALBUMIN SERPL-MCNC: 1.9 G/DL (ref 3.4–5)
ALBUMIN/GLOB SERPL: 0.4 {RATIO} (ref 1–1.7)
ALP SERPL-CCNC: 131 U/L (ref 46–116)
ALT SERPL-CCNC: 23 U/L (ref 14–59)
ANION GAP SERPL CALC-SCNC: 12 MMOL/L (ref 6–14)
ANION GAP SERPL CALC-SCNC: 19 MMOL/L (ref 6–14)
AST SERPL-CCNC: 31 U/L (ref 15–37)
BASOPHILS # BLD AUTO: 0 X10^3/UL (ref 0–0.2)
BASOPHILS NFR BLD: 0 % (ref 0–3)
BILIRUB SERPL-MCNC: 0.5 MG/DL (ref 0.2–1)
BUN SERPL-MCNC: 50 MG/DL (ref 7–20)
BUN SERPL-MCNC: 58 MG/DL (ref 7–20)
BUN/CREAT SERPL: 32 (ref 6–20)
CALCIUM SERPL-MCNC: 11 MG/DL (ref 8.5–10.1)
CALCIUM SERPL-MCNC: 11 MG/DL (ref 8.5–10.1)
CHLORIDE SERPL-SCNC: 113 MMOL/L (ref 98–107)
CHLORIDE SERPL-SCNC: 114 MMOL/L (ref 98–107)
CO2 SERPL-SCNC: 19 MMOL/L (ref 21–32)
CO2 SERPL-SCNC: 25 MMOL/L (ref 21–32)
CREAT SERPL-MCNC: 1.3 MG/DL (ref 0.6–1)
CREAT SERPL-MCNC: 1.8 MG/DL (ref 0.6–1)
EOSINOPHIL NFR BLD: 0.2 X10^3/UL (ref 0–0.7)
EOSINOPHIL NFR BLD: 2 % (ref 0–3)
ERYTHROCYTE [DISTWIDTH] IN BLOOD BY AUTOMATED COUNT: 20.7 % (ref 11.5–14.5)
ERYTHROCYTE [DISTWIDTH] IN BLOOD BY AUTOMATED COUNT: 20.8 % (ref 11.5–14.5)
GFR SERPLBLD BASED ON 1.73 SQ M-ARVRAT: 29.9 ML/MIN
GFR SERPLBLD BASED ON 1.73 SQ M-ARVRAT: 43.5 ML/MIN
GLOBULIN SER-MCNC: 5.1 G/DL (ref 2.2–3.8)
GLUCOSE SERPL-MCNC: 194 MG/DL (ref 70–99)
GLUCOSE SERPL-MCNC: 241 MG/DL (ref 70–99)
HCT VFR BLD CALC: 28.3 % (ref 36–47)
HCT VFR BLD CALC: 29.6 % (ref 36–47)
HGB BLD-MCNC: 8.4 G/DL (ref 12–15.5)
HGB BLD-MCNC: 9.3 G/DL (ref 12–15.5)
LYMPHOCYTES # BLD: 2.2 X10^3/UL (ref 1–4.8)
LYMPHOCYTES NFR BLD AUTO: 20 % (ref 24–48)
MAGNESIUM SERPL-MCNC: 2.6 MG/DL (ref 1.8–2.4)
MCH RBC QN AUTO: 28 PG (ref 25–35)
MCH RBC QN AUTO: 28 PG (ref 25–35)
MCHC RBC AUTO-ENTMCNC: 30 G/DL (ref 31–37)
MCHC RBC AUTO-ENTMCNC: 31 G/DL (ref 31–37)
MCV RBC AUTO: 90 FL (ref 79–100)
MCV RBC AUTO: 95 FL (ref 79–100)
MONO #: 0.7 X10^3/UL (ref 0–1.1)
MONOCYTES NFR BLD: 7 % (ref 0–9)
NEUT #: 8 X10^3/UL (ref 1.8–7.7)
NEUTROPHILS NFR BLD AUTO: 72 % (ref 31–73)
PHOSPHATE SERPL-MCNC: 4.4 MG/DL (ref 2.6–4.7)
PLATELET # BLD AUTO: 583 X10^3/UL (ref 140–400)
PLATELET # BLD AUTO: 787 X10^3/UL (ref 140–400)
POTASSIUM SERPL-SCNC: 4 MMOL/L (ref 3.5–5.1)
POTASSIUM SERPL-SCNC: 4.6 MMOL/L (ref 3.5–5.1)
PROT SERPL-MCNC: 7 G/DL (ref 6.4–8.2)
PROTHROMBIN TIME: 15.3 SEC (ref 11.7–14)
RBC # BLD AUTO: 2.97 X10^6/UL (ref 3.5–5.4)
RBC # BLD AUTO: 3.3 X10^6/UL (ref 3.5–5.4)
SODIUM SERPL-SCNC: 151 MMOL/L (ref 136–145)
SODIUM SERPL-SCNC: 151 MMOL/L (ref 136–145)
WBC # BLD AUTO: 11.1 X10^3/UL (ref 4–11)
WBC # BLD AUTO: 32.2 X10^3/UL (ref 4–11)

## 2020-06-05 RX ADMIN — INSULIN LISPRO SCH UNITS: 100 INJECTION, SOLUTION INTRAVENOUS; SUBCUTANEOUS at 19:34

## 2020-06-05 RX ADMIN — ONDANSETRON PRN MG: 2 INJECTION INTRAMUSCULAR; INTRAVENOUS at 02:20

## 2020-06-05 RX ADMIN — INSULIN LISPRO SCH UNITS: 100 INJECTION, SOLUTION INTRAVENOUS; SUBCUTANEOUS at 13:41

## 2020-06-05 RX ADMIN — FENTANYL CITRATE PRN MCG: 50 INJECTION INTRAMUSCULAR; INTRAVENOUS at 23:45

## 2020-06-05 RX ADMIN — INSULIN LISPRO SCH UNITS: 100 INJECTION, SOLUTION INTRAVENOUS; SUBCUTANEOUS at 06:35

## 2020-06-05 RX ADMIN — FENTANYL CITRATE PRN MCG: 50 INJECTION INTRAMUSCULAR; INTRAVENOUS at 13:45

## 2020-06-05 RX ADMIN — ONDANSETRON PRN MG: 2 INJECTION INTRAMUSCULAR; INTRAVENOUS at 06:33

## 2020-06-05 RX ADMIN — ENOXAPARIN SODIUM SCH MG: 40 INJECTION SUBCUTANEOUS at 17:44

## 2020-06-05 RX ADMIN — FENTANYL CITRATE PRN MCG: 50 INJECTION INTRAMUSCULAR; INTRAVENOUS at 07:25

## 2020-06-05 RX ADMIN — FUROSEMIDE SCH MG: 10 INJECTION, SOLUTION INTRAMUSCULAR; INTRAVENOUS at 11:14

## 2020-06-05 RX ADMIN — ACETAMINOPHEN PRN MG: 650 SUPPOSITORY RECTAL at 23:37

## 2020-06-05 RX ADMIN — INSULIN LISPRO SCH UNITS: 100 INJECTION, SOLUTION INTRAVENOUS; SUBCUTANEOUS at 23:37

## 2020-06-05 RX ADMIN — INSULIN LISPRO SCH UNITS: 100 INJECTION, SOLUTION INTRAVENOUS; SUBCUTANEOUS at 00:04

## 2020-06-05 RX ADMIN — PANTOPRAZOLE SODIUM SCH MG: 40 INJECTION, POWDER, FOR SOLUTION INTRAVENOUS at 07:26

## 2020-06-05 RX ADMIN — INSULIN LISPRO SCH UNITS: 100 INJECTION, SOLUTION INTRAVENOUS; SUBCUTANEOUS at 06:00

## 2020-06-05 RX ADMIN — Medication PRN EACH: at 15:21

## 2020-06-05 RX ADMIN — FENTANYL CITRATE PRN MCG: 50 INJECTION INTRAMUSCULAR; INTRAVENOUS at 01:08

## 2020-06-05 NOTE — NUR
Pharmacy TPN Dosing Note



S: JESENIA BEAN is a 49 year old F Currently receiving Central Continuous TPN started 
03/18/20



B:Pertinent PMH: Necrotizing pancreatitis

Height: 5 feet, 8 inches

Weight: 68.5 kg



Current diet: NPO 



LABS:

Sodium:    151 

Potassium: 4 

Chloride:  114 

Calcium:   11.0 

Corrected Calcium: 12.60 

Magnesium: 2.6 

CO2:       25 

SCr:       1.3 

Glucose:   177-220 

Albumin:   2.0 

AST:       15 

ALT:       14 



TPN FORMULA:

TPN TYPE:  Central Continuous

AMINO ACIDS:         75 gm

DEXTROSE:            250 gm

LIPIDS:              20 gm



POTASSIUM ACETATE:   80 mEq

MAGNESIUM:           5 mEq

INSULIN:             20 units

MULTIPLE VITAMIN:    10 ml

TRACE ELEMENTS:      1 ml(s)



TPN PLAN:  

Total volume increased d/t increased sodium

Mag and potassium adjusted per labs.





R: Change TPN per plan and ordered formula

Will monitor electrolytes, glucose, and tolerance to TPN.



 Maribell Vazquez RUTHANN, 06/05/20 1526

## 2020-06-05 NOTE — PDOC
PULMONARY PROGRESS NOTES


Subjective


Patient intubated on 3/23 , s/p trach 4/6, weak cough, requiring assistance with

secretions 


Remains on TS, Awake and alert in chair today, doesn't like PMV


Vitals





Vital Signs








  Date Time  Temp Pulse Resp B/P (MAP) Pulse Ox O2 Delivery O2 Flow Rate FiO2


 


6/5/20 10:09     93 Room Air 10.0 


 


6/5/20 07:00 98.0 121 22 135/88 (104)    





 98.0       








ROS:  No Nausea, No Chest Pain, No Abdominal Pain, No Increase Cough


General:  Alert, No acute distress


Lungs:  Other (diminshed in bases )


Cardiovascular:  S1, S2


Abdomen:  Soft, Non-tender


Neuro Exam:  Alert


Extremities:  Other (+1 BLE edema)


Skin:  Warm, Dry


Labs





Laboratory Tests








Test


 6/3/20


12:57 6/3/20


18:20 6/4/20


02:21 6/4/20


02:24


 


Glucose (Fingerstick)


 233 mg/dL


(70-99) 166 mg/dL


(70-99) 529 mg/dL


(70-99) 182 mg/dL


(70-99)


 


Test


 6/4/20


06:20 6/4/20


12:53 6/4/20


18:08 6/4/20


22:50


 


White Blood Count


 12.3 x10^3/uL


(4.0-11.0) 


 


 





 


Red Blood Count


 2.80 x10^6/uL


(3.50-5.40) 


 


 





 


Hemoglobin


 7.7 g/dL


(12.0-15.5) 


 


 





 


Hematocrit


 24.8 %


(36.0-47.0) 


 


 





 


Mean Corpuscular Volume 89 fL ()    


 


Mean Corpuscular Hemoglobin 28 pg (25-35)    


 


Mean Corpuscular Hemoglobin


Concent 31 g/dL


(31-37) 


 


 





 


Red Cell Distribution Width


 20.2 %


(11.5-14.5) 


 


 





 


Platelet Count


 551 x10^3/uL


(140-400) 


 


 





 


Neutrophils (%) (Auto) 73 % (31-73)    


 


Lymphocytes (%) (Auto) 17 % (24-48)    


 


Monocytes (%) (Auto) 8 % (0-9)    


 


Eosinophils (%) (Auto) 1 % (0-3)    


 


Basophils (%) (Auto) 0 % (0-3)    


 


Neutrophils # (Auto)


 9.0 x10^3/uL


(1.8-7.7) 


 


 





 


Lymphocytes # (Auto)


 2.1 x10^3/uL


(1.0-4.8) 


 


 





 


Monocytes # (Auto)


 1.0 x10^3/uL


(0.0-1.1) 


 


 





 


Eosinophils # (Auto)


 0.2 x10^3/uL


(0.0-0.7) 


 


 





 


Basophils # (Auto)


 0.0 x10^3/uL


(0.0-0.2) 


 


 





 


Sodium Level


 150 mmol/L


(136-145) 


 


 





 


Potassium Level


 3.4 mmol/L


(3.5-5.1) 


 


 





 


Chloride Level


 113 mmol/L


() 


 


 





 


Carbon Dioxide Level


 26 mmol/L


(21-32) 


 


 





 


Anion Gap 11 (6-14)    


 


Blood Urea Nitrogen


 42 mg/dL


(7-20) 


 


 





 


Creatinine


 1.2 mg/dL


(0.6-1.0) 


 


 





 


Estimated GFR


(Cockcroft-Gault) 47.7 


 


 


 





 


Glucose Level


 154 mg/dL


(70-99) 


 


 





 


Glucose (Fingerstick)


 147 mg/dL


(70-99) 174 mg/dL


(70-99) 139 mg/dL


(70-99) 202 mg/dL


(70-99)


 


Calcium Level


 10.5 mg/dL


(8.5-10.1) 


 


 





 


Phosphorus Level


 3.0 mg/dL


(2.6-4.7) 


 


 





 


Magnesium Level


 2.5 mg/dL


(1.8-2.4) 


 


 





 


Triglycerides Level


 449 mg/dL


(0-150) 


 


 





 


Test


 6/5/20


05:15 6/5/20


06:22 6/5/20


09:42 





 


White Blood Count


 11.1 x10^3/uL


(4.0-11.0) 


 


 





 


Red Blood Count


 3.30 x10^6/uL


(3.50-5.40) 


 


 





 


Hemoglobin


 9.3 g/dL


(12.0-15.5) 


 


 





 


Hematocrit


 29.6 %


(36.0-47.0) 


 


 





 


Mean Corpuscular Volume 90 fL ()    


 


Mean Corpuscular Hemoglobin 28 pg (25-35)    


 


Mean Corpuscular Hemoglobin


Concent 31 g/dL


(31-37) 


 


 





 


Red Cell Distribution Width


 20.7 %


(11.5-14.5) 


 


 





 


Platelet Count


 583 x10^3/uL


(140-400) 


 


 





 


Neutrophils (%) (Auto) 72 % (31-73)    


 


Lymphocytes (%) (Auto) 20 % (24-48)    


 


Monocytes (%) (Auto) 7 % (0-9)    


 


Eosinophils (%) (Auto) 2 % (0-3)    


 


Basophils (%) (Auto) 0 % (0-3)    


 


Neutrophils # (Auto)


 8.0 x10^3/uL


(1.8-7.7) 


 


 





 


Lymphocytes # (Auto)


 2.2 x10^3/uL


(1.0-4.8) 


 


 





 


Monocytes # (Auto)


 0.7 x10^3/uL


(0.0-1.1) 


 


 





 


Eosinophils # (Auto)


 0.2 x10^3/uL


(0.0-0.7) 


 


 





 


Basophils # (Auto)


 0.0 x10^3/uL


(0.0-0.2) 


 


 





 


Sodium Level


 151 mmol/L


(136-145) 


 


 





 


Potassium Level


 4.0 mmol/L


(3.5-5.1) 


 


 





 


Chloride Level


 114 mmol/L


() 


 


 





 


Carbon Dioxide Level


 25 mmol/L


(21-32) 


 


 





 


Anion Gap 12 (6-14)    


 


Blood Urea Nitrogen


 50 mg/dL


(7-20) 


 


 





 


Creatinine


 1.3 mg/dL


(0.6-1.0) 


 


 





 


Estimated GFR


(Cockcroft-Gault) 43.5 


 


 


 





 


Glucose Level


 194 mg/dL


(70-99) 


 


 





 


Calcium Level


 11.0 mg/dL


(8.5-10.1) 


 


 





 


Phosphorus Level


 4.4 mg/dL


(2.6-4.7) 


 


 





 


Magnesium Level


 2.6 mg/dL


(1.8-2.4) 


 


 





 


Glucose (Fingerstick)


 


 218 mg/dL


(70-99) 177 mg/dL


(70-99) 











Laboratory Tests








Test


 6/4/20


12:53 6/4/20


18:08 6/4/20


22:50 6/5/20


05:15


 


Glucose (Fingerstick)


 174 mg/dL


(70-99) 139 mg/dL


(70-99) 202 mg/dL


(70-99) 





 


White Blood Count


 


 


 


 11.1 x10^3/uL


(4.0-11.0)


 


Red Blood Count


 


 


 


 3.30 x10^6/uL


(3.50-5.40)


 


Hemoglobin


 


 


 


 9.3 g/dL


(12.0-15.5)


 


Hematocrit


 


 


 


 29.6 %


(36.0-47.0)


 


Mean Corpuscular Volume    90 fL () 


 


Mean Corpuscular Hemoglobin    28 pg (25-35) 


 


Mean Corpuscular Hemoglobin


Concent 


 


 


 31 g/dL


(31-37)


 


Red Cell Distribution Width


 


 


 


 20.7 %


(11.5-14.5)


 


Platelet Count


 


 


 


 583 x10^3/uL


(140-400)


 


Neutrophils (%) (Auto)    72 % (31-73) 


 


Lymphocytes (%) (Auto)    20 % (24-48) 


 


Monocytes (%) (Auto)    7 % (0-9) 


 


Eosinophils (%) (Auto)    2 % (0-3) 


 


Basophils (%) (Auto)    0 % (0-3) 


 


Neutrophils # (Auto)


 


 


 


 8.0 x10^3/uL


(1.8-7.7)


 


Lymphocytes # (Auto)


 


 


 


 2.2 x10^3/uL


(1.0-4.8)


 


Monocytes # (Auto)


 


 


 


 0.7 x10^3/uL


(0.0-1.1)


 


Eosinophils # (Auto)


 


 


 


 0.2 x10^3/uL


(0.0-0.7)


 


Basophils # (Auto)


 


 


 


 0.0 x10^3/uL


(0.0-0.2)


 


Sodium Level


 


 


 


 151 mmol/L


(136-145)


 


Potassium Level


 


 


 


 4.0 mmol/L


(3.5-5.1)


 


Chloride Level


 


 


 


 114 mmol/L


()


 


Carbon Dioxide Level


 


 


 


 25 mmol/L


(21-32)


 


Anion Gap    12 (6-14) 


 


Blood Urea Nitrogen


 


 


 


 50 mg/dL


(7-20)


 


Creatinine


 


 


 


 1.3 mg/dL


(0.6-1.0)


 


Estimated GFR


(Cockcroft-Gault) 


 


 


 43.5 





 


Glucose Level


 


 


 


 194 mg/dL


(70-99)


 


Calcium Level


 


 


 


 11.0 mg/dL


(8.5-10.1)


 


Phosphorus Level


 


 


 


 4.4 mg/dL


(2.6-4.7)


 


Magnesium Level


 


 


 


 2.6 mg/dL


(1.8-2.4)


 


Test


 6/5/20


06:22 6/5/20


09:42 


 





 


Glucose (Fingerstick)


 218 mg/dL


(70-99) 177 mg/dL


(70-99) 


 











Medications





Active Scripts








 Medications  Dose


 Route/Sig


 Max Daily Dose Days Date Category


 


 Bisoprolol


 Fumarate 5 Mg


 Tablet  10 Mg


 PO DAILY


   3/16/20 Reported











Impression


.


IMPRESSION:


1.  Acute hypoxemic respiratory failure secondary to ARDS status post trach,


2.  Gallstone pancreatitis


3.  Severe metabolic acidosis.stable


4.  Acute kidney injury-stable, Off HD-- continue to improve 


5.  Acute gallstone pancreatitis.


6.  Hypoalbuminemia.


7.  Moderate persistent effusions, s/p left thora  5/12


8.  Fever-  Per ID, per surgery--resolved 


9.  Chronic anemia


10. Covid 19 testing negative


11. Moderate to large ascites-S/P paracentisis


12.S/P paracentisis with 4 liters removed on 4/15/20


13. S/P IR drain placement on 5/8/2020


14. Depression/Anxiety





Plan


.


1.Continue supplemental oxygen via trach shield--  PMV/capping as tolerated/ prn

suction.


2. s/p  thoracentesis, 5/12, 3 litres removed


3. Follow surgery recs-- S/P 3 drain placed in IR on 5/8/2020


4. Follow ID recs for ABX-- currently off ABX 


5. Follow nephrology recs 


6. Continue TPN 


DVT/GI PPX: lovenox / protonix 


PT/OT


D/W RN and pt





CODE:FULL











SHARYN SOLORZANO MD                  Jun 5, 2020 10:17

## 2020-06-05 NOTE — NUR
SS following up with discharge planning. SS reviewed pt chart and discussed with pt RN. Pt 
currently on room air. ST cleared for honey thick liquids but pt declining to wear trach 
cap. Pt walking with PT but had a fall today in room after walking down garsia. SS and Dr. Franco went in room and modeling was observed on legs and pt was trembling. Dr. Franco 
addressed pt and requested that she wear trach cap today. Dr. Franco reported that if not 
improved will address hospice with pt and pt's family next week. SS will continue to follow 
for discharge planning.

## 2020-06-05 NOTE — NUR
Pt up working with physical therapy. Pt and PT walked down the hallway and back to patient 
room. Upon entering patient room, pt started to become weakened and PTAlthea called out for 
assistance. PT staff, Kendall, assisted. Both staff members, Althea PT and Kendall PT assisted pt to 
the floor using a gaitbelt. During this episode, patient was having a bowel movement. VS 
stable before and after episode. Post assisted fall /72, HR  140, RR 24, T 98.3, and 
O2 sat 98%. Pt placed back into bed via lift. PT extremely anxious but quickly calmed by 
presence of staff. Physician on unit at time of episode. No new orders given. Will continue 
to monitor.

## 2020-06-05 NOTE — PDOC
[facility] PROGRESS NOTE


Chief Complaint


Chief Complaint


Acute hypoxic Respiratory failure requiring mechanical ventilation (now 

extubated for several days but still with tracheostomy)


Tracheostomy


bilateral pleural effusions/pulm edema 


Sepsis


Severe Acute gallstone pancreatitis (not a surgical candidate at this time) with

necrosis


Acute kidney failure now requiring dialysis


Salpingitis


Gallstones (Calculus of gallbladder with acute cholecystitis without obstruc

tion)


HTN 


Leukocytosis 


Hypoxia


Uterine fibroid


Intractable pain


Intractable nausea


Covid 19 negative. 


Acute on chronic anemia 


EEG: No seizure activityFever  - better currently - intermittent could be from 

underlying pancreatitis blood cults 5/4 - neg so far


? Ileus with vomiting


Abd distention - U/S and CT reviewed s/p 0.4 L of opaque, debris-containing 

ascites was removed 5/6


Acute pancreatitis with persistent necrosis


- 4/27 status post ROBERT drain placement + C paropsilosis. s/p additional drains 

5/8


Anemia - S/p PRBCs


Cholelithiasis with thickening of the gallbladder wall.


Leucocytosis improving


JED, hyperkalemia, Metabolic acidosis off dialysis


Acute hypoxic resp failure ,bilateral pleural effusion and atelectasis


hypocalcemia 


Prediabetes


HTN


s/p trach


ESRD on HD


Hyperglycemia





History of Present Illness


History of Present Illness


6/5/2020


Patient seen and examined on telemetry floor today


This morning I had a couple of discussions with case management


The issue is the patient will not wear her trach cap


Without that she cannot advance her diet and is currently supposed to be on 

honey thick liquids


I was going to talk to the patient about wearing her trach But when I arrived to

the room she was lying on the floor with several nurses and therapist around


Apparently she did walk to the end of the garisa then back and then collapsed onto

the floor


She had a bowel movement when this all happened


Appears to be critically ill again


Very shaky tremulous anxious has a stare in her eyes


We got her back in bed


I am extremely concerned about her long-term prognosis again











6/4/2020


Patient seen and examined in the ICU


She remains critically ill is is extremely weak


Chart reviewed


Discussed with RN


We decided to try some Prozac as she does seem depressed


On IV TPN


Still has NG tube








6/3/2020


Patient seen and examined in the ICU


She remains critically ill


We have been trying to hold off on her Ativan for the past 24 hours


She is a little more awake but shaky and agitated and anxious seems depressed


Discussed with RN


Chart reviewed








6/2/2020


Patient seen and examined in the ICU


She is a little more alert today but still quite ill


Appears clammy and pale and depressed/anxious


Discussed with RN


Chart reviewed











6/1/2020


Patient seen and examined in the ICU


She appears extremely ill


She is tachypneic at 35 respirations per minute and tachycardic at 132 bpm


She is extremely encephalopathic and shaky


She appears clammy


Chart reviewed


Discussed with RN


Prognosis extremely guarded at best








5/31/2020


Patient still in ICU


Resting with no apparent distress


Chart reviewed








5/30/2020


Patient seen and examined in the ICU


She is wiping her face with a cough


Discussed with RN


Chart reviewed


We hope to get her out of the ICU later today if possible








5/29/2020


Patient seen and examined in the ICU once again


She is back on NG suction


On IV Zosyn


Has IV TPN


Sedated with Precedex but anxious still


Appears somewhat clammy and pale


Chart reviewed


Discussed with RN


She remains critically ill














5/28/2020


Patient seen and examined in the ICU


She has an NG that is clamped we are hoping to start some clear liquids but she 

looks quite ill


She is on IV TPN


Meropenem changed to IV Zosyn (agree)


She has Chino to bedside drainage


She is semi-sedated with Precedex


Chart reviewed


Discussed with RN


She remains critically ill











Seen bedside. Hb 8. She was just a bit hypoxic, had a mucous plug suctioned. 

Able to vocalize well with speaking valve, tells me we are being very hard on 

her and she would like to be drugged back to sleep.  She wants to wake up and 

feel better. On trach shield, 


T max 100.4. afebrile this a.m.





5/26/2020


Patient seen and examined in the ICU


She is up in the chair very frail trying to talk a little shaky


Discussed with RN


Chart reviewed








5/25/2020


Patient seen and examined in the ICU


Patient up in the chair


Having a severe coughing episode with a lot of phlegm coming out of her 

tracheostomy


Discussed with RN


Discussed with physical therapy


Chart reviewed











5/24,.    feels well,  has been out of room in wheelchair


no complaint,    still weak,   some with not wanting to wear her valve on trach


cont other,   may be able to work with speech tomorrow,    ketty OK to 

try, 








5/23,  anxiety is up today,   she dislikes the valve still,    


shower and outside today


.   





5/22 she doesnt want to wear her passy-kristan valve,  discussed str and plan with 

her.


speech following,  needs swallow study,   but needs to wear her valve longer,  


cont current


able to walk some, walker 





5/21  stronger,   better,   


we discussed better oral care


she would like to try swallow study,  wants to try to eat,    speech is 

following








5/20/2020 


She remains in the ICU


sitting up and working with OT,   getting better


if limit pain meds, may do better off the vent, 





Nurses trying to suction her,  that is also improved, 


Chart reviewed


 








5/19/2020


Patient seen and examined in the ICU


She had an episode yesterday of tachycardia and severe agitation we gave her 

some Ativan


After that she seemed to have stroke symptoms but now that the Ativan has wore 

off her stroke symptoms have resolved


 


 


She is on IV meropenem and daptomycin and micafungin


Chart reviewed


Discussed with RN


Patient is still critically ill


 


BRIEF OPERATIVE NOTE


Pre-Op Diagnosis


Pancreatitis with pseudocysts, suspected infection


Post-Op Diagnosis


same


Procedure Performed


CT abdominal Drains x 3


Surgeon


Hardy


Anesthesia Type:  Conscious Sedation


Findings


3 abdominal drains, 14F, with turbid pancreatic fluid and necrotic debris in 

each.


Complications


No immediate








5/9: Patient today somewhat restless and having bilious secretions from ET tube,

imaging studies ordered, discussed with consultant. Pretty poor prognosis, 

hopefully is not a fistula, poor surgical candidate. 





5/10: Imaging with no acute events, she seems more stable today compared to 

yesterday. Encouraged as much activity as possible patient at high risk for 

severe depression.





Vitals/I&O


Vitals/I&O:





                                   Vital Signs








  Date Time  Temp Pulse Resp B/P (MAP) Pulse Ox O2 Delivery O2 Flow Rate FiO2


 


6/5/20 10:47 98.9 140 22 113/71 (85) 90 Room Air  





 98.9       


 


6/5/20 10:09       10.0 














                                    I & O   


 


 6/4/20 6/4/20 6/5/20





 15:00 23:00 07:00


 


Intake Total 0 ml 872 ml 


 


Output Total 525 ml 600 ml 300 ml


 


Balance -525 ml 272 ml -300 ml











Physical Exam


Physical Exam:


GENERAL: Was initially on the floor but we got her into bed


HEENT: Oral mucosa is semi-dry


NECK:  Trach present


LUNGS: Has some slight crackles and some sputum production coming from her trach


HEART:  S1, S2, regular 


ABDOMEN: mod distention, bowel sounds present, 


: Chino (4/14)


EXTREMITIES: Mild edema and also seems to be developing some mottling??


SKIN: Cool clammy and pale


CNS:Opens eyes to voice, very weak 


LUE-PICC (5/29) without signsof complications


General:  Alert, Cooperative, No acute distress


Heart:  Regular rate, Normal S1, Normal S2, No murmurs, Gallops


Lungs:  Other (diminshed in bases )


Abdomen:  Soft, No tenderness


Extremities:  No clubbing, No cyanosis, No edema, Normal pulses, No 

tenderness/swelling


Skin:  Other (warm, dry)





Labs


Labs:





Laboratory Tests








Test


 6/4/20


12:53 6/4/20


18:08 6/4/20


22:50 6/5/20


05:15


 


Glucose (Fingerstick)


 174 mg/dL


(70-99) 139 mg/dL


(70-99) 202 mg/dL


(70-99) 





 


White Blood Count


 


 


 


 11.1 x10^3/uL


(4.0-11.0)


 


Red Blood Count


 


 


 


 3.30 x10^6/uL


(3.50-5.40)


 


Hemoglobin


 


 


 


 9.3 g/dL


(12.0-15.5)


 


Hematocrit


 


 


 


 29.6 %


(36.0-47.0)


 


Mean Corpuscular Volume    90 fL () 


 


Mean Corpuscular Hemoglobin    28 pg (25-35) 


 


Mean Corpuscular Hemoglobin


Concent 


 


 


 31 g/dL


(31-37)


 


Red Cell Distribution Width


 


 


 


 20.7 %


(11.5-14.5)


 


Platelet Count


 


 


 


 583 x10^3/uL


(140-400)


 


Neutrophils (%) (Auto)    72 % (31-73) 


 


Lymphocytes (%) (Auto)    20 % (24-48) 


 


Monocytes (%) (Auto)    7 % (0-9) 


 


Eosinophils (%) (Auto)    2 % (0-3) 


 


Basophils (%) (Auto)    0 % (0-3) 


 


Neutrophils # (Auto)


 


 


 


 8.0 x10^3/uL


(1.8-7.7)


 


Lymphocytes # (Auto)


 


 


 


 2.2 x10^3/uL


(1.0-4.8)


 


Monocytes # (Auto)


 


 


 


 0.7 x10^3/uL


(0.0-1.1)


 


Eosinophils # (Auto)


 


 


 


 0.2 x10^3/uL


(0.0-0.7)


 


Basophils # (Auto)


 


 


 


 0.0 x10^3/uL


(0.0-0.2)


 


Sodium Level


 


 


 


 151 mmol/L


(136-145)


 


Potassium Level


 


 


 


 4.0 mmol/L


(3.5-5.1)


 


Chloride Level


 


 


 


 114 mmol/L


()


 


Carbon Dioxide Level


 


 


 


 25 mmol/L


(21-32)


 


Anion Gap    12 (6-14) 


 


Blood Urea Nitrogen


 


 


 


 50 mg/dL


(7-20)


 


Creatinine


 


 


 


 1.3 mg/dL


(0.6-1.0)


 


Estimated GFR


(Cockcroft-Gault) 


 


 


 43.5 





 


Glucose Level


 


 


 


 194 mg/dL


(70-99)


 


Calcium Level


 


 


 


 11.0 mg/dL


(8.5-10.1)


 


Phosphorus Level


 


 


 


 4.4 mg/dL


(2.6-4.7)


 


Magnesium Level


 


 


 


 2.6 mg/dL


(1.8-2.4)


 


Thyroid Stimulating Hormone


(TSH) 


 


 


 3.722 uIU/mL


(0.358-3.74)


 


Test


 6/5/20


06:22 6/5/20


09:42 


 





 


Glucose (Fingerstick)


 218 mg/dL


(70-99) 177 mg/dL


(70-99) 


 














Assessment and Plan


Assessmemt and Plan


Problems


Medical Problems:


(1) Acute pancreatitis


Status: Acute  





(2) Cholelithiasis


Status: Acute  





Acute hypoxic Respiratory failure requiring mechanical ventilation was initially

intubated on 3/23 was off for couple of days now back on


Severe gallstone pancreatitis (not a surgical candidate at this time) with 

necrosis


Tracheostomy


bilateral pleural effusions/pulm edema 


Severe sepsis


Acute kidney failure now requiring dialysis


Salpingitis


Gallstones (Calculus of gallbladder with acute cholecystitis without 

obstruction)


HTN 


Leukocytosis 


Hypoxia


Uterine fibroid


Intractable pain


Intractable nausea


Covid 19 negative. 


Acute on chronic anemia 


EEG: No seizure activityFever  - better currently - intermittent could be from 

underlying pancreatitis blood cults 5/4 - neg so far


? Ileus with vomiting


Abd distention - U/S and CT reviewed s/p 0.4 L of opaque, debris-containing 

ascites was removed 5/6


Acute pancreatitis with persistent necrosis


- 4/27 status post ROBERT drain placement + C paropsilosis. s/p additional drains 

5/8


Anemia - S/p PRBCs


Cholelithiasis with thickening of the gallbladder wall.


Leucocytosis improving


JED, hyperkalemia, Metabolic acidosis off dialysis


Acute hypoxic resp failure ,bilateral pleural effusion and atelectasis


hypocalcemia 


Prediabetes


HTN


s/p trach


ESRD on HD


Hyperglycemia








Plan


Cardiac monitor


Wound care


Trying to minimize sedation but she gets anxious so easily


Honey thickened liquids if she will keep her trach capped on


Humidified O2 via nasal cannula for now but we can use trach shield as backup


NG suctioning


IV Zosyn


Nebulizers


TPN protocol


Continue Ch[place] to bedside drainage


Tracheostomy care


Hope to eventually move towards decannulation (we have a speaking valve for now)


Trend labs


Added Prozac 20 mg per NG daily yesterday


We will also ask psychiatry to give an opinion about her psychological status


Appreciate subspecialist input


Prognosis extremely guarded at best! (she scored a 9 on Vandana criteria 5 weeks 

ago).


She remains extremely ill and I am not sure if she is going to make it,  but we 

certainly hope so. 


Total time 34 minutes





Comment


Review of Relevant


I have reviewed the following items josy (where applicable) has been applied.


Medications:





Current Medications








 Medications


  (Trade)  Dose


 Ordered  Sig/Yee


 Route


 PRN Reason  Start Time


 Stop Time Status Last Admin


Dose Admin


 


 Fluoxetine HCl


  (PROzac)  20 mg  QHS


 PEG


   6/4/20 21:00


    6/4/20 21:23





 


 Fentanyl


  (Duragesic 50mcg/


 Hr Patch)  1 patch  Q72H


 TD


   6/4/20 21:00


    6/4/20 21:22





 


 Potassium


 Chloride 40 meq/


 Potassium Acetate


 60 meq/Magnesium


 Sulfate 10 meq/


 Multivitamins 10


 ml/Chromium/


 Copper/Manganese/


 Seleni/Zn 1 ml/


 Insulin Human


 Regular 20 unit/


 Total Parenteral


 Nutrition/Amino


 Acids/Dextrose/


 Fat Emulsion


 Intravenous  1,800 ml @ 


 75 mls/hr  TPN  CONT


 IV


   6/4/20 22:00


 6/5/20 21:59  6/5/20 00:03














Justicifation of Admission Dx:


Justifications for Admission:


Justification of Admission Dx:  Yes











HECTOR MASON III DO            Jun 5, 2020 11:14

## 2020-06-05 NOTE — PDOC
Infectious Disease Note


Subjective


Subjective


awake in bed, 


nodes that she is ok





ROS


ROS


No nausea vomiting diarrhea fever





Vital Sign


Vital Signs





Vital Signs








  Date Time  Temp Pulse Resp B/P (MAP) Pulse Ox O2 Delivery O2 Flow Rate FiO2


 


6/5/20 07:25     93 Room Air 10.0 


 


6/5/20 02:54 98.3 118 20 143/93 (110)    





 98.3       











Physical Exam


PHYSICAL EXAM


GENERAL: Propped up in bed,, weak appearing see above


HEENT:  Oral cavity clear,  NGT


NECK:  Trach present


LUNGS: Clear, nonlabored 


HEART:  S1, S2, regular 


ABDOMEN: mod distention, bowel sounds present, 


: [place] (4/14)


EXTREMITIES: Generalized edema. no cyanosis


SKIN: Cool clammy and pale


CNS:Opens eyes to voice, very weak 


LUE-PICC (5/29) without signsof complications





Labs


Lab





Laboratory Tests








Test


 6/4/20


12:53 6/4/20


18:08 6/4/20


22:50 6/5/20


05:15


 


Glucose (Fingerstick)


 174 mg/dL


(70-99) 139 mg/dL


(70-99) 202 mg/dL


(70-99) 





 


White Blood Count


 


 


 


 11.1 x10^3/uL


(4.0-11.0)


 


Red Blood Count


 


 


 


 3.30 x10^6/uL


(3.50-5.40)


 


Hemoglobin


 


 


 


 9.3 g/dL


(12.0-15.5)


 


Hematocrit


 


 


 


 29.6 %


(36.0-47.0)


 


Mean Corpuscular Volume    90 fL () 


 


Mean Corpuscular Hemoglobin    28 pg (25-35) 


 


Mean Corpuscular Hemoglobin


Concent 


 


 


 31 g/dL


(31-37)


 


Red Cell Distribution Width


 


 


 


 20.7 %


(11.5-14.5)


 


Platelet Count


 


 


 


 583 x10^3/uL


(140-400)


 


Neutrophils (%) (Auto)    72 % (31-73) 


 


Lymphocytes (%) (Auto)    20 % (24-48) 


 


Monocytes (%) (Auto)    7 % (0-9) 


 


Eosinophils (%) (Auto)    2 % (0-3) 


 


Basophils (%) (Auto)    0 % (0-3) 


 


Neutrophils # (Auto)


 


 


 


 8.0 x10^3/uL


(1.8-7.7)


 


Lymphocytes # (Auto)


 


 


 


 2.2 x10^3/uL


(1.0-4.8)


 


Monocytes # (Auto)


 


 


 


 0.7 x10^3/uL


(0.0-1.1)


 


Eosinophils # (Auto)


 


 


 


 0.2 x10^3/uL


(0.0-0.7)


 


Basophils # (Auto)


 


 


 


 0.0 x10^3/uL


(0.0-0.2)


 


Sodium Level


 


 


 


 151 mmol/L


(136-145)


 


Potassium Level


 


 


 


 4.0 mmol/L


(3.5-5.1)


 


Chloride Level


 


 


 


 114 mmol/L


()


 


Carbon Dioxide Level


 


 


 


 25 mmol/L


(21-32)


 


Anion Gap    12 (6-14) 


 


Blood Urea Nitrogen


 


 


 


 50 mg/dL


(7-20)


 


Creatinine


 


 


 


 1.3 mg/dL


(0.6-1.0)


 


Estimated GFR


(Cockcroft-Gault) 


 


 


 43.5 





 


Glucose Level


 


 


 


 194 mg/dL


(70-99)


 


Calcium Level


 


 


 


 11.0 mg/dL


(8.5-10.1)


 


Phosphorus Level


 


 


 


 4.4 mg/dL


(2.6-4.7)


 


Magnesium Level


 


 


 


 2.6 mg/dL


(1.8-2.4)


 


Test


 6/5/20


06:22 


 


 





 


Glucose (Fingerstick)


 218 mg/dL


(70-99) 


 


 











Micro








Objective


Assessment


Fever intermittent could be from underlying pancreatitis vs aspiration 


? Ileus with vomiting


Abd distention - U/S and CT reviewed s/p 0.4 L of opaque, debris-containing 

ascites was removed 5/6


Acute pancreatitis with persistent necrosis


  - 4/27 status post ROBERT drain placement + C paropsilosis; 5/6 + yeast & high 

amylase; s/p additional drains on 5/8


Anemia - S/p PRBCs


Cholelithiasis with thickening of the gallbladder wall.


Leucocytosis improved 


JED, hyperkalemia, Metabolic acidosis off dialysis


Acute hypoxic resp failure ,bilateral pleural effusion and atelectasis


hypocalcemia 


Prediabetes


HTN


s/p trach





Plan


Plan of Care


Off antibiotics


f/u labs/cath tip culture 


Aggressive pulmonary toilet


Maintain aspiration precaution


Supportive care


PT and OT as tolerated





D/w nursing











LINN FRANZ MD                Jun 5, 2020 08:19

## 2020-06-05 NOTE — NUR
Pt asking to take a shower. PT and OT okay with showering her. Pt stable at time of shower. 
During shower, PT yelled out for help. Upon assessment, pt was found unresponsive, with 
fixed pupils and weak pulses. Code blue called and CPR initiated. Placed on monitor. Pt 
became responsive very shortly after CPR initiated. VS completely stable. Pt transferred in 
stable condition to ICU. Family notified and updated on situation.

## 2020-06-05 NOTE — PDOC
Subjective:


Subjective:


I saw her earlier this morning.


Was sitting on bed w/ therapy - coughing - then didn't want to leave cap on 

trach.





Objective:


Objective:


D/w nurse and therapy.


Vital Signs:





                                   Vital Signs








  Date Time  Temp Pulse Resp B/P (MAP) Pulse Ox O2 Delivery O2 Flow Rate FiO2


 


6/5/20 10:09     93 Room Air 10.0 


 


6/5/20 07:00 98.0 121 22 135/88 (104)    





 98.0       








Labs:





Laboratory Tests








Test


 6/4/20


12:53 6/4/20


18:08 6/4/20


22:50 6/5/20


05:15


 


Glucose (Fingerstick) 174 mg/dL  139 mg/dL  202 mg/dL  


 


White Blood Count    11.1 x10^3/uL 


 


Red Blood Count    3.30 x10^6/uL 


 


Hemoglobin    9.3 g/dL 


 


Hematocrit    29.6 % 


 


Mean Corpuscular Volume    90 fL 


 


Mean Corpuscular Hemoglobin    28 pg 


 


Mean Corpuscular Hemoglobin


Concent 


 


 


 31 g/dL 





 


Red Cell Distribution Width    20.7 % 


 


Platelet Count    583 x10^3/uL 


 


Neutrophils (%) (Auto)    72 % 


 


Lymphocytes (%) (Auto)    20 % 


 


Monocytes (%) (Auto)    7 % 


 


Eosinophils (%) (Auto)    2 % 


 


Basophils (%) (Auto)    0 % 


 


Neutrophils # (Auto)    8.0 x10^3/uL 


 


Lymphocytes # (Auto)    2.2 x10^3/uL 


 


Monocytes # (Auto)    0.7 x10^3/uL 


 


Eosinophils # (Auto)    0.2 x10^3/uL 


 


Basophils # (Auto)    0.0 x10^3/uL 


 


Sodium Level    151 mmol/L 


 


Potassium Level    4.0 mmol/L 


 


Chloride Level    114 mmol/L 


 


Carbon Dioxide Level    25 mmol/L 


 


Anion Gap    12 


 


Blood Urea Nitrogen    50 mg/dL 


 


Creatinine    1.3 mg/dL 


 


Estimated GFR


(Cockcroft-Gault) 


 


 


 43.5 





 


Glucose Level    194 mg/dL 


 


Calcium Level    11.0 mg/dL 


 


Phosphorus Level    4.4 mg/dL 


 


Magnesium Level    2.6 mg/dL 


 


Test


 6/5/20


06:22 6/5/20


09:42 


 





 


Glucose (Fingerstick) 218 mg/dL  177 mg/dL   











PE:





GEN: NAD


LUNGS: CTAB


HEART: RRR


ABD: S/ND/NT


NEURO/PSYCH: A & O 3





A/P:


Pancreatitis, MOSF





--


Supportive care.





Justicifation of Admission Dx:


Justifications for Admission:


Justification of Admission Dx:  Yes











RAGHAVENDRA MURCIA          Jun 5, 2020 10:40

## 2020-06-05 NOTE — PDOC
SURGICAL PROGRESS NOTE


Subjective


Pt appears comfortable, awake


Vital Signs





Vital Signs








  Date Time  Temp Pulse Resp B/P (MAP) Pulse Ox O2 Delivery O2 Flow Rate FiO2


 


6/5/20 07:25     93 Room Air 10.0 


 


6/5/20 07:00 98.0 121 22 135/88 (104)    





 98.0       








I&O











Intake and Output 


 


 6/5/20





 07:00


 


Intake Total 872 ml


 


Output Total 1425 ml


 


Balance -553 ml


 


 


 


Intake Oral 0 ml


 


IV Total 872 ml


 


Output Urine Total 1425 ml


 


# Bowel Movements 1








General:  Alert, Cooperative, No acute distress


Abdomen:  Soft, No tenderness


Labs





Laboratory Tests








Test


 6/3/20


12:57 6/3/20


18:20 6/4/20


02:21 6/4/20


02:24


 


Glucose (Fingerstick)


 233 mg/dL


(70-99) 166 mg/dL


(70-99) 529 mg/dL


(70-99) 182 mg/dL


(70-99)


 


Test


 6/4/20


06:20 6/4/20


12:53 6/4/20


18:08 6/4/20


22:50


 


White Blood Count


 12.3 x10^3/uL


(4.0-11.0) 


 


 





 


Red Blood Count


 2.80 x10^6/uL


(3.50-5.40) 


 


 





 


Hemoglobin


 7.7 g/dL


(12.0-15.5) 


 


 





 


Hematocrit


 24.8 %


(36.0-47.0) 


 


 





 


Mean Corpuscular Volume 89 fL ()    


 


Mean Corpuscular Hemoglobin 28 pg (25-35)    


 


Mean Corpuscular Hemoglobin


Concent 31 g/dL


(31-37) 


 


 





 


Red Cell Distribution Width


 20.2 %


(11.5-14.5) 


 


 





 


Platelet Count


 551 x10^3/uL


(140-400) 


 


 





 


Neutrophils (%) (Auto) 73 % (31-73)    


 


Lymphocytes (%) (Auto) 17 % (24-48)    


 


Monocytes (%) (Auto) 8 % (0-9)    


 


Eosinophils (%) (Auto) 1 % (0-3)    


 


Basophils (%) (Auto) 0 % (0-3)    


 


Neutrophils # (Auto)


 9.0 x10^3/uL


(1.8-7.7) 


 


 





 


Lymphocytes # (Auto)


 2.1 x10^3/uL


(1.0-4.8) 


 


 





 


Monocytes # (Auto)


 1.0 x10^3/uL


(0.0-1.1) 


 


 





 


Eosinophils # (Auto)


 0.2 x10^3/uL


(0.0-0.7) 


 


 





 


Basophils # (Auto)


 0.0 x10^3/uL


(0.0-0.2) 


 


 





 


Sodium Level


 150 mmol/L


(136-145) 


 


 





 


Potassium Level


 3.4 mmol/L


(3.5-5.1) 


 


 





 


Chloride Level


 113 mmol/L


() 


 


 





 


Carbon Dioxide Level


 26 mmol/L


(21-32) 


 


 





 


Anion Gap 11 (6-14)    


 


Blood Urea Nitrogen


 42 mg/dL


(7-20) 


 


 





 


Creatinine


 1.2 mg/dL


(0.6-1.0) 


 


 





 


Estimated GFR


(Cockcroft-Gault) 47.7 


 


 


 





 


Glucose Level


 154 mg/dL


(70-99) 


 


 





 


Glucose (Fingerstick)


 147 mg/dL


(70-99) 174 mg/dL


(70-99) 139 mg/dL


(70-99) 202 mg/dL


(70-99)


 


Calcium Level


 10.5 mg/dL


(8.5-10.1) 


 


 





 


Phosphorus Level


 3.0 mg/dL


(2.6-4.7) 


 


 





 


Magnesium Level


 2.5 mg/dL


(1.8-2.4) 


 


 





 


Triglycerides Level


 449 mg/dL


(0-150) 


 


 





 


Test


 6/5/20


05:15 6/5/20


06:22 


 





 


White Blood Count


 11.1 x10^3/uL


(4.0-11.0) 


 


 





 


Red Blood Count


 3.30 x10^6/uL


(3.50-5.40) 


 


 





 


Hemoglobin


 9.3 g/dL


(12.0-15.5) 


 


 





 


Hematocrit


 29.6 %


(36.0-47.0) 


 


 





 


Mean Corpuscular Volume 90 fL ()    


 


Mean Corpuscular Hemoglobin 28 pg (25-35)    


 


Mean Corpuscular Hemoglobin


Concent 31 g/dL


(31-37) 


 


 





 


Red Cell Distribution Width


 20.7 %


(11.5-14.5) 


 


 





 


Platelet Count


 583 x10^3/uL


(140-400) 


 


 





 


Neutrophils (%) (Auto) 72 % (31-73)    


 


Lymphocytes (%) (Auto) 20 % (24-48)    


 


Monocytes (%) (Auto) 7 % (0-9)    


 


Eosinophils (%) (Auto) 2 % (0-3)    


 


Basophils (%) (Auto) 0 % (0-3)    


 


Neutrophils # (Auto)


 8.0 x10^3/uL


(1.8-7.7) 


 


 





 


Lymphocytes # (Auto)


 2.2 x10^3/uL


(1.0-4.8) 


 


 





 


Monocytes # (Auto)


 0.7 x10^3/uL


(0.0-1.1) 


 


 





 


Eosinophils # (Auto)


 0.2 x10^3/uL


(0.0-0.7) 


 


 





 


Basophils # (Auto)


 0.0 x10^3/uL


(0.0-0.2) 


 


 





 


Sodium Level


 151 mmol/L


(136-145) 


 


 





 


Potassium Level


 4.0 mmol/L


(3.5-5.1) 


 


 





 


Chloride Level


 114 mmol/L


() 


 


 





 


Carbon Dioxide Level


 25 mmol/L


(21-32) 


 


 





 


Anion Gap 12 (6-14)    


 


Blood Urea Nitrogen


 50 mg/dL


(7-20) 


 


 





 


Creatinine


 1.3 mg/dL


(0.6-1.0) 


 


 





 


Estimated GFR


(Cockcroft-Gault) 43.5 


 


 


 





 


Glucose Level


 194 mg/dL


(70-99) 


 


 





 


Calcium Level


 11.0 mg/dL


(8.5-10.1) 


 


 





 


Phosphorus Level


 4.4 mg/dL


(2.6-4.7) 


 


 





 


Magnesium Level


 2.6 mg/dL


(1.8-2.4) 


 


 





 


Glucose (Fingerstick)


 


 218 mg/dL


(70-99) 


 











Laboratory Tests








Test


 6/4/20


12:53 6/4/20


18:08 6/4/20


22:50 6/5/20


05:15


 


Glucose (Fingerstick)


 174 mg/dL


(70-99) 139 mg/dL


(70-99) 202 mg/dL


(70-99) 





 


White Blood Count


 


 


 


 11.1 x10^3/uL


(4.0-11.0)


 


Red Blood Count


 


 


 


 3.30 x10^6/uL


(3.50-5.40)


 


Hemoglobin


 


 


 


 9.3 g/dL


(12.0-15.5)


 


Hematocrit


 


 


 


 29.6 %


(36.0-47.0)


 


Mean Corpuscular Volume    90 fL () 


 


Mean Corpuscular Hemoglobin    28 pg (25-35) 


 


Mean Corpuscular Hemoglobin


Concent 


 


 


 31 g/dL


(31-37)


 


Red Cell Distribution Width


 


 


 


 20.7 %


(11.5-14.5)


 


Platelet Count


 


 


 


 583 x10^3/uL


(140-400)


 


Neutrophils (%) (Auto)    72 % (31-73) 


 


Lymphocytes (%) (Auto)    20 % (24-48) 


 


Monocytes (%) (Auto)    7 % (0-9) 


 


Eosinophils (%) (Auto)    2 % (0-3) 


 


Basophils (%) (Auto)    0 % (0-3) 


 


Neutrophils # (Auto)


 


 


 


 8.0 x10^3/uL


(1.8-7.7)


 


Lymphocytes # (Auto)


 


 


 


 2.2 x10^3/uL


(1.0-4.8)


 


Monocytes # (Auto)


 


 


 


 0.7 x10^3/uL


(0.0-1.1)


 


Eosinophils # (Auto)


 


 


 


 0.2 x10^3/uL


(0.0-0.7)


 


Basophils # (Auto)


 


 


 


 0.0 x10^3/uL


(0.0-0.2)


 


Sodium Level


 


 


 


 151 mmol/L


(136-145)


 


Potassium Level


 


 


 


 4.0 mmol/L


(3.5-5.1)


 


Chloride Level


 


 


 


 114 mmol/L


()


 


Carbon Dioxide Level


 


 


 


 25 mmol/L


(21-32)


 


Anion Gap    12 (6-14) 


 


Blood Urea Nitrogen


 


 


 


 50 mg/dL


(7-20)


 


Creatinine


 


 


 


 1.3 mg/dL


(0.6-1.0)


 


Estimated GFR


(Cockcroft-Gault) 


 


 


 43.5 





 


Glucose Level


 


 


 


 194 mg/dL


(70-99)


 


Calcium Level


 


 


 


 11.0 mg/dL


(8.5-10.1)


 


Phosphorus Level


 


 


 


 4.4 mg/dL


(2.6-4.7)


 


Magnesium Level


 


 


 


 2.6 mg/dL


(1.8-2.4)


 


Test


 6/5/20


06:22 


 


 





 


Glucose (Fingerstick)


 218 mg/dL


(70-99) 


 


 











Problem List


Problems


Medical Problems:


(1) Acute pancreatitis


Status: Acute  





(2) Cholelithiasis


Status: Acute  








Assessment/Plan


severe pancreatitis


will clamp NGT with anticipation of working towards starting tube feeds.





Justicifation of Admission Dx:


Justifications for Admission:


Justification of Admission Dx:  Yes











GAMAL ZHOU MD              Jun 5, 2020 08:25

## 2020-06-05 NOTE — NUR
Patient transferred to room 104 after episode in shower on CVC. Code blue was called. See 
Code blue sheet. 

Patient A/O, had pulse when RN arrived. ST, BP stable. According to RN, PT, patient passed 
out in shower, weak pulses, CPR started. 

RN updated patient's daughter and BFRolf, who came to see patient at 1830. 



Patient is on RA, comfortable, sleeping at this time. Call light w/in reach, oriented to 
what happened.

## 2020-06-06 VITALS — DIASTOLIC BLOOD PRESSURE: 55 MMHG | SYSTOLIC BLOOD PRESSURE: 84 MMHG

## 2020-06-06 VITALS — DIASTOLIC BLOOD PRESSURE: 68 MMHG | SYSTOLIC BLOOD PRESSURE: 109 MMHG

## 2020-06-06 VITALS — DIASTOLIC BLOOD PRESSURE: 71 MMHG | SYSTOLIC BLOOD PRESSURE: 107 MMHG

## 2020-06-06 VITALS — DIASTOLIC BLOOD PRESSURE: 71 MMHG | SYSTOLIC BLOOD PRESSURE: 99 MMHG

## 2020-06-06 VITALS — DIASTOLIC BLOOD PRESSURE: 75 MMHG | SYSTOLIC BLOOD PRESSURE: 105 MMHG

## 2020-06-06 VITALS — SYSTOLIC BLOOD PRESSURE: 89 MMHG | DIASTOLIC BLOOD PRESSURE: 55 MMHG

## 2020-06-06 VITALS — DIASTOLIC BLOOD PRESSURE: 78 MMHG | SYSTOLIC BLOOD PRESSURE: 108 MMHG

## 2020-06-06 VITALS — SYSTOLIC BLOOD PRESSURE: 88 MMHG | DIASTOLIC BLOOD PRESSURE: 54 MMHG

## 2020-06-06 VITALS — SYSTOLIC BLOOD PRESSURE: 106 MMHG | DIASTOLIC BLOOD PRESSURE: 72 MMHG

## 2020-06-06 VITALS — SYSTOLIC BLOOD PRESSURE: 98 MMHG | DIASTOLIC BLOOD PRESSURE: 64 MMHG

## 2020-06-06 VITALS — DIASTOLIC BLOOD PRESSURE: 54 MMHG | SYSTOLIC BLOOD PRESSURE: 92 MMHG

## 2020-06-06 VITALS — SYSTOLIC BLOOD PRESSURE: 104 MMHG | DIASTOLIC BLOOD PRESSURE: 73 MMHG

## 2020-06-06 VITALS — DIASTOLIC BLOOD PRESSURE: 62 MMHG | SYSTOLIC BLOOD PRESSURE: 89 MMHG

## 2020-06-06 VITALS — DIASTOLIC BLOOD PRESSURE: 73 MMHG | SYSTOLIC BLOOD PRESSURE: 107 MMHG

## 2020-06-06 VITALS — SYSTOLIC BLOOD PRESSURE: 106 MMHG | DIASTOLIC BLOOD PRESSURE: 76 MMHG

## 2020-06-06 VITALS — SYSTOLIC BLOOD PRESSURE: 99 MMHG | DIASTOLIC BLOOD PRESSURE: 54 MMHG

## 2020-06-06 VITALS — SYSTOLIC BLOOD PRESSURE: 108 MMHG | DIASTOLIC BLOOD PRESSURE: 72 MMHG

## 2020-06-06 VITALS — SYSTOLIC BLOOD PRESSURE: 99 MMHG | DIASTOLIC BLOOD PRESSURE: 71 MMHG

## 2020-06-06 VITALS — SYSTOLIC BLOOD PRESSURE: 105 MMHG | DIASTOLIC BLOOD PRESSURE: 73 MMHG

## 2020-06-06 VITALS — SYSTOLIC BLOOD PRESSURE: 144 MMHG | DIASTOLIC BLOOD PRESSURE: 72 MMHG

## 2020-06-06 VITALS — SYSTOLIC BLOOD PRESSURE: 99 MMHG | DIASTOLIC BLOOD PRESSURE: 64 MMHG

## 2020-06-06 VITALS — SYSTOLIC BLOOD PRESSURE: 108 MMHG | DIASTOLIC BLOOD PRESSURE: 68 MMHG

## 2020-06-06 VITALS — DIASTOLIC BLOOD PRESSURE: 56 MMHG | SYSTOLIC BLOOD PRESSURE: 89 MMHG

## 2020-06-06 VITALS — SYSTOLIC BLOOD PRESSURE: 107 MMHG | DIASTOLIC BLOOD PRESSURE: 65 MMHG

## 2020-06-06 VITALS — DIASTOLIC BLOOD PRESSURE: 57 MMHG | SYSTOLIC BLOOD PRESSURE: 103 MMHG

## 2020-06-06 VITALS — DIASTOLIC BLOOD PRESSURE: 51 MMHG | SYSTOLIC BLOOD PRESSURE: 101 MMHG

## 2020-06-06 VITALS — DIASTOLIC BLOOD PRESSURE: 57 MMHG | SYSTOLIC BLOOD PRESSURE: 97 MMHG

## 2020-06-06 VITALS — DIASTOLIC BLOOD PRESSURE: 56 MMHG | SYSTOLIC BLOOD PRESSURE: 88 MMHG

## 2020-06-06 VITALS — DIASTOLIC BLOOD PRESSURE: 61 MMHG | SYSTOLIC BLOOD PRESSURE: 90 MMHG

## 2020-06-06 VITALS — SYSTOLIC BLOOD PRESSURE: 112 MMHG | DIASTOLIC BLOOD PRESSURE: 77 MMHG

## 2020-06-06 VITALS — SYSTOLIC BLOOD PRESSURE: 105 MMHG | DIASTOLIC BLOOD PRESSURE: 74 MMHG

## 2020-06-06 LAB
AMYLASE SERPL-CCNC: 385 U/L (ref 25–115)
ANION GAP SERPL CALC-SCNC: 8 MMOL/L (ref 6–14)
BASE EXCESS ABG: -4 MMOL/L (ref -3–3)
BASOPHILS # BLD AUTO: 0 X10^3/UL (ref 0–0.2)
BASOPHILS NFR BLD: 0 % (ref 0–3)
BUN SERPL-MCNC: 69 MG/DL (ref 7–20)
CALCIUM SERPL-MCNC: 10.4 MG/DL (ref 8.5–10.1)
CHLORIDE SERPL-SCNC: 113 MMOL/L (ref 98–107)
CO2 SERPL-SCNC: 26 MMOL/L (ref 21–32)
CREAT SERPL-MCNC: 1.9 MG/DL (ref 0.6–1)
EOSINOPHIL NFR BLD: 0 % (ref 0–3)
EOSINOPHIL NFR BLD: 0.1 X10^3/UL (ref 0–0.7)
ERYTHROCYTE [DISTWIDTH] IN BLOOD BY AUTOMATED COUNT: 18 % (ref 11.5–14.5)
ERYTHROCYTE [DISTWIDTH] IN BLOOD BY AUTOMATED COUNT: 19.5 % (ref 11.5–14.5)
ERYTHROCYTE [DISTWIDTH] IN BLOOD BY AUTOMATED COUNT: 21 % (ref 11.5–14.5)
GFR SERPLBLD BASED ON 1.73 SQ M-ARVRAT: 28.1 ML/MIN
GLUCOSE SERPL-MCNC: 240 MG/DL (ref 70–99)
HCO3 BLDA-SCNC: 22 MMOL/L (ref 21–28)
HCT VFR BLD CALC: 22.1 % (ref 36–47)
HCT VFR BLD CALC: 22.2 % (ref 36–47)
HCT VFR BLD CALC: 27.2 % (ref 36–47)
HGB BLD-MCNC: 7 G/DL (ref 12–15.5)
HGB BLD-MCNC: 7.2 G/DL (ref 12–15.5)
HGB BLD-MCNC: 9.1 G/DL (ref 12–15.5)
INSPIRATION SETTING TIME VENT: 21
LIPASE: 563 U/L (ref 73–393)
LYMPHOCYTES # BLD: 2.2 X10^3/UL (ref 1–4.8)
LYMPHOCYTES NFR BLD AUTO: 11 % (ref 24–48)
MAGNESIUM SERPL-MCNC: 2.4 MG/DL (ref 1.8–2.4)
MCH RBC QN AUTO: 29 PG (ref 25–35)
MCH RBC QN AUTO: 29 PG (ref 25–35)
MCH RBC QN AUTO: 30 PG (ref 25–35)
MCHC RBC AUTO-ENTMCNC: 31 G/DL (ref 31–37)
MCHC RBC AUTO-ENTMCNC: 33 G/DL (ref 31–37)
MCHC RBC AUTO-ENTMCNC: 33 G/DL (ref 31–37)
MCV RBC AUTO: 89 FL (ref 79–100)
MCV RBC AUTO: 89 FL (ref 79–100)
MCV RBC AUTO: 91 FL (ref 79–100)
MONO #: 0.9 X10^3/UL (ref 0–1.1)
MONOCYTES NFR BLD: 4 % (ref 0–9)
NEUT #: 18 X10^3/UL (ref 1.8–7.7)
NEUTROPHILS NFR BLD AUTO: 85 % (ref 31–73)
PCO2 BLDA: 39 MMHG (ref 35–46)
PHOSPHATE SERPL-MCNC: 4 MG/DL (ref 2.6–4.7)
PLATELET # BLD AUTO: 448 X10^3/UL (ref 140–400)
PLATELET # BLD AUTO: 476 X10^3/UL (ref 140–400)
PLATELET # BLD AUTO: 570 X10^3/UL (ref 140–400)
PO2 BLDA: 53 MMHG (ref 75–108)
POTASSIUM SERPL-SCNC: 5.1 MMOL/L (ref 3.5–5.1)
RBC # BLD AUTO: 2.44 X10^6/UL (ref 3.5–5.4)
RBC # BLD AUTO: 2.48 X10^6/UL (ref 3.5–5.4)
RBC # BLD AUTO: 3.07 X10^6/UL (ref 3.5–5.4)
SAO2 % BLDA: 82 % (ref 92–99)
SODIUM SERPL-SCNC: 147 MMOL/L (ref 136–145)
WBC # BLD AUTO: 16.9 X10^3/UL (ref 4–11)
WBC # BLD AUTO: 18.7 X10^3/UL (ref 4–11)
WBC # BLD AUTO: 21.2 X10^3/UL (ref 4–11)

## 2020-06-06 RX ADMIN — ONDANSETRON PRN MG: 2 INJECTION INTRAMUSCULAR; INTRAVENOUS at 06:27

## 2020-06-06 RX ADMIN — ENOXAPARIN SODIUM SCH MG: 40 INJECTION SUBCUTANEOUS at 17:00

## 2020-06-06 RX ADMIN — ACETAMINOPHEN PRN MG: 650 SUPPOSITORY RECTAL at 19:52

## 2020-06-06 RX ADMIN — FENTANYL CITRATE PRN MCG: 50 INJECTION INTRAMUSCULAR; INTRAVENOUS at 21:55

## 2020-06-06 RX ADMIN — Medication PRN EACH: at 10:05

## 2020-06-06 RX ADMIN — PANTOPRAZOLE SODIUM SCH MG: 40 INJECTION, POWDER, FOR SOLUTION INTRAVENOUS at 07:30

## 2020-06-06 RX ADMIN — PROCHLORPERAZINE EDISYLATE PRN MG: 5 INJECTION INTRAMUSCULAR; INTRAVENOUS at 22:21

## 2020-06-06 RX ADMIN — ALBUMIN (HUMAN) PRN MLS/HR: 12.5 INJECTION, SOLUTION INTRAVENOUS at 11:40

## 2020-06-06 RX ADMIN — INSULIN LISPRO SCH UNITS: 100 INJECTION, SOLUTION INTRAVENOUS; SUBCUTANEOUS at 05:54

## 2020-06-06 RX ADMIN — Medication PRN EACH: at 10:03

## 2020-06-06 RX ADMIN — FENTANYL CITRATE PRN MCG: 50 INJECTION INTRAMUSCULAR; INTRAVENOUS at 06:27

## 2020-06-06 RX ADMIN — ONDANSETRON PRN MG: 2 INJECTION INTRAMUSCULAR; INTRAVENOUS at 19:59

## 2020-06-06 RX ADMIN — INSULIN LISPRO SCH UNITS: 100 INJECTION, SOLUTION INTRAVENOUS; SUBCUTANEOUS at 13:55

## 2020-06-06 RX ADMIN — INSULIN LISPRO SCH UNITS: 100 INJECTION, SOLUTION INTRAVENOUS; SUBCUTANEOUS at 18:22

## 2020-06-06 RX ADMIN — FUROSEMIDE SCH MG: 10 INJECTION, SOLUTION INTRAMUSCULAR; INTRAVENOUS at 09:00

## 2020-06-06 RX ADMIN — ONDANSETRON PRN MG: 2 INJECTION INTRAMUSCULAR; INTRAVENOUS at 15:15

## 2020-06-06 NOTE — PDOC
PROGRESS NOTES


Subjective


Subjective


lethargic,  nurse reports saturating dressings from right lateral drain site;  

CT scan ordered and pending





Objective


Objective





Vital Signs








  Date Time  Temp Pulse Resp B/P (MAP) Pulse Ox O2 Delivery O2 Flow Rate FiO2


 


6/6/20 11:35 100.2 124 26 88/54    





 100.2       


 


6/6/20 11:00     95 Room Air  


 


6/6/20 06:57       10.0 














Intake and Output 


 


 6/6/20





 07:00


 


Output Total 540 ml


 


Balance -540 ml


 


 


 


Output Urine Total 540 ml











Physical Exam


Physical Exam


abdomen with some distension, R lateral drain site with small amount of reddish,

brownish drainage





Assessment


Assessment


Problems


Medical Problems:


(1) Acute pancreatitis


Status: Acute  





(2) Cholelithiasis


Status: Acute  











Plan


Plan of Care


CT abdomen pending;  will follow, supportive care





Comment


Review of Relevant


I have reviewed the following items josy (where applicable) has been applied.


Labs





Laboratory Tests








Test


 6/4/20


12:53 6/4/20


18:08 6/4/20


22:50 6/5/20


05:15


 


Glucose (Fingerstick)


 174 mg/dL


(70-99) 139 mg/dL


(70-99) 202 mg/dL


(70-99) 





 


White Blood Count


 


 


 


 11.1 x10^3/uL


(4.0-11.0)


 


Red Blood Count


 


 


 


 3.30 x10^6/uL


(3.50-5.40)


 


Hemoglobin


 


 


 


 9.3 g/dL


(12.0-15.5)


 


Hematocrit


 


 


 


 29.6 %


(36.0-47.0)


 


Mean Corpuscular Volume    90 fL () 


 


Mean Corpuscular Hemoglobin    28 pg (25-35) 


 


Mean Corpuscular Hemoglobin


Concent 


 


 


 31 g/dL


(31-37)


 


Red Cell Distribution Width


 


 


 


 20.7 %


(11.5-14.5)


 


Platelet Count


 


 


 


 583 x10^3/uL


(140-400)


 


Neutrophils (%) (Auto)    72 % (31-73) 


 


Lymphocytes (%) (Auto)    20 % (24-48) 


 


Monocytes (%) (Auto)    7 % (0-9) 


 


Eosinophils (%) (Auto)    2 % (0-3) 


 


Basophils (%) (Auto)    0 % (0-3) 


 


Neutrophils # (Auto)


 


 


 


 8.0 x10^3/uL


(1.8-7.7)


 


Lymphocytes # (Auto)


 


 


 


 2.2 x10^3/uL


(1.0-4.8)


 


Monocytes # (Auto)


 


 


 


 0.7 x10^3/uL


(0.0-1.1)


 


Eosinophils # (Auto)


 


 


 


 0.2 x10^3/uL


(0.0-0.7)


 


Basophils # (Auto)


 


 


 


 0.0 x10^3/uL


(0.0-0.2)


 


Sodium Level


 


 


 


 151 mmol/L


(136-145)


 


Potassium Level


 


 


 


 4.0 mmol/L


(3.5-5.1)


 


Chloride Level


 


 


 


 114 mmol/L


()


 


Carbon Dioxide Level


 


 


 


 25 mmol/L


(21-32)


 


Anion Gap    12 (6-14) 


 


Blood Urea Nitrogen


 


 


 


 50 mg/dL


(7-20)


 


Creatinine


 


 


 


 1.3 mg/dL


(0.6-1.0)


 


Estimated GFR


(Cockcroft-Gault) 


 


 


 43.5 





 


Glucose Level


 


 


 


 194 mg/dL


(70-99)


 


Calcium Level


 


 


 


 11.0 mg/dL


(8.5-10.1)


 


Phosphorus Level


 


 


 


 4.4 mg/dL


(2.6-4.7)


 


Magnesium Level


 


 


 


 2.6 mg/dL


(1.8-2.4)


 


Thyroid Stimulating Hormone


(TSH) 


 


 


 3.722 uIU/mL


(0.358-3.74)


 


Test


 6/5/20


06:22 6/5/20


09:42 6/5/20


12:26 6/5/20


16:00


 


Glucose (Fingerstick)


 218 mg/dL


(70-99) 177 mg/dL


(70-99) 220 mg/dL


(70-99) 





 


White Blood Count


 


 


 


 32.2 x10^3/uL


(4.0-11.0)


 


Red Blood Count


 


 


 


 2.97 x10^6/uL


(3.50-5.40)


 


Hemoglobin


 


 


 


 8.4 g/dL


(12.0-15.5)


 


Hematocrit


 


 


 


 28.3 %


(36.0-47.0)


 


Mean Corpuscular Volume    95 fL () 


 


Mean Corpuscular Hemoglobin    28 pg (25-35) 


 


Mean Corpuscular Hemoglobin


Concent 


 


 


 30 g/dL


(31-37)


 


Red Cell Distribution Width


 


 


 


 20.8 %


(11.5-14.5)


 


Platelet Count


 


 


 


 787 x10^3/uL


(140-400)


 


Sodium Level


 


 


 


 151 mmol/L


(136-145)


 


Potassium Level


 


 


 


 4.6 mmol/L


(3.5-5.1)


 


Chloride Level


 


 


 


 113 mmol/L


()


 


Carbon Dioxide Level


 


 


 


 19 mmol/L


(21-32)


 


Anion Gap    19 (6-14) 


 


Blood Urea Nitrogen


 


 


 


 58 mg/dL


(7-20)


 


Creatinine


 


 


 


 1.8 mg/dL


(0.6-1.0)


 


Estimated GFR


(Cockcroft-Gault) 


 


 


 29.9 





 


BUN/Creatinine Ratio    32 (6-20) 


 


Glucose Level


 


 


 


 241 mg/dL


(70-99)


 


Calcium Level


 


 


 


 11.0 mg/dL


(8.5-10.1)


 


Total Bilirubin


 


 


 


 0.5 mg/dL


(0.2-1.0)


 


Aspartate Amino Transf


(AST/SGOT) 


 


 


 31 U/L (15-37) 





 


Alanine Aminotransferase


(ALT/SGPT) 


 


 


 23 U/L (14-59) 





 


Alkaline Phosphatase


 


 


 


 131 U/L


()


 


Total Protein


 


 


 


 7.0 g/dL


(6.4-8.2)


 


Albumin


 


 


 


 1.9 g/dL


(3.4-5.0)


 


Albumin/Globulin Ratio    0.4 (1.0-1.7) 


 


Test


 6/5/20


17:45 6/5/20


23:35 6/6/20


05:53 6/6/20


05:55


 


Prothrombin Time


 15.3 SEC


(11.7-14.0) 


 


 





 


Prothromb Time International


Ratio 1.3 (0.8-1.1) 


 


 


 





 


Glucose (Fingerstick)


 209 mg/dL


(70-99) 178 mg/dL


(70-99) 216 mg/dL


(70-99) 





 


Lactic Acid Level


 1.9 mmol/L


(0.4-2.0) 


 


 





 


Sodium Level


 


 


 


 147 mmol/L


(136-145)


 


Potassium Level


 


 


 


 5.1 mmol/L


(3.5-5.1)


 


Chloride Level


 


 


 


 113 mmol/L


()


 


Carbon Dioxide Level


 


 


 


 26 mmol/L


(21-32)


 


Anion Gap    8 (6-14) 


 


Blood Urea Nitrogen


 


 


 


 69 mg/dL


(7-20)


 


Creatinine


 


 


 


 1.9 mg/dL


(0.6-1.0)


 


Estimated GFR


(Cockcroft-Gault) 


 


 


 28.1 





 


Glucose Level


 


 


 


 240 mg/dL


(70-99)


 


Calcium Level


 


 


 


 10.4 mg/dL


(8.5-10.1)


 


Phosphorus Level


 


 


 


 4.0 mg/dL


(2.6-4.7)


 


Magnesium Level


 


 


 


 2.4 mg/dL


(1.8-2.4)


 


Amylase Level


 


 


 


 385 U/L


()


 


Lipase


 


 


 


 563 U/L


()


 


Test


 6/6/20


06:45 


 


 





 


White Blood Count


 21.2 x10^3/uL


(4.0-11.0) 


 


 





 


Red Blood Count


 2.44 x10^6/uL


(3.50-5.40) 


 


 





 


Hemoglobin


 7.0 g/dL


(12.0-15.5) 


 


 





 


Hematocrit


 22.2 %


(36.0-47.0) 


 


 





 


Mean Corpuscular Volume 91 fL ()    


 


Mean Corpuscular Hemoglobin 29 pg (25-35)    


 


Mean Corpuscular Hemoglobin


Concent 31 g/dL


(31-37) 


 


 





 


Red Cell Distribution Width


 21.0 %


(11.5-14.5) 


 


 





 


Platelet Count


 570 x10^3/uL


(140-400) 


 


 





 


Neutrophils (%) (Auto) 85 % (31-73)    


 


Lymphocytes (%) (Auto) 11 % (24-48)    


 


Monocytes (%) (Auto) 4 % (0-9)    


 


Eosinophils (%) (Auto) 0 % (0-3)    


 


Basophils (%) (Auto) 0 % (0-3)    


 


Neutrophils # (Auto)


 18.0 x10^3/uL


(1.8-7.7) 


 


 





 


Lymphocytes # (Auto)


 2.2 x10^3/uL


(1.0-4.8) 


 


 





 


Monocytes # (Auto)


 0.9 x10^3/uL


(0.0-1.1) 


 


 





 


Eosinophils # (Auto)


 0.1 x10^3/uL


(0.0-0.7) 


 


 





 


Basophils # (Auto)


 0.0 x10^3/uL


(0.0-0.2) 


 


 











Laboratory Tests








Test


 6/5/20


12:26 6/5/20


16:00 6/5/20


17:45 6/5/20


23:35


 


Glucose (Fingerstick)


 220 mg/dL


(70-99) 


 209 mg/dL


(70-99) 178 mg/dL


(70-99)


 


White Blood Count


 


 32.2 x10^3/uL


(4.0-11.0) 


 





 


Red Blood Count


 


 2.97 x10^6/uL


(3.50-5.40) 


 





 


Hemoglobin


 


 8.4 g/dL


(12.0-15.5) 


 





 


Hematocrit


 


 28.3 %


(36.0-47.0) 


 





 


Mean Corpuscular Volume  95 fL ()   


 


Mean Corpuscular Hemoglobin  28 pg (25-35)   


 


Mean Corpuscular Hemoglobin


Concent 


 30 g/dL


(31-37) 


 





 


Red Cell Distribution Width


 


 20.8 %


(11.5-14.5) 


 





 


Platelet Count


 


 787 x10^3/uL


(140-400) 


 





 


Sodium Level


 


 151 mmol/L


(136-145) 


 





 


Potassium Level


 


 4.6 mmol/L


(3.5-5.1) 


 





 


Chloride Level


 


 113 mmol/L


() 


 





 


Carbon Dioxide Level


 


 19 mmol/L


(21-32) 


 





 


Anion Gap  19 (6-14)   


 


Blood Urea Nitrogen


 


 58 mg/dL


(7-20) 


 





 


Creatinine


 


 1.8 mg/dL


(0.6-1.0) 


 





 


Estimated GFR


(Cockcroft-Gault) 


 29.9 


 


 





 


BUN/Creatinine Ratio  32 (6-20)   


 


Glucose Level


 


 241 mg/dL


(70-99) 


 





 


Calcium Level


 


 11.0 mg/dL


(8.5-10.1) 


 





 


Total Bilirubin


 


 0.5 mg/dL


(0.2-1.0) 


 





 


Aspartate Amino Transf


(AST/SGOT) 


 31 U/L (15-37) 


 


 





 


Alanine Aminotransferase


(ALT/SGPT) 


 23 U/L (14-59) 


 


 





 


Alkaline Phosphatase


 


 131 U/L


() 


 





 


Total Protein


 


 7.0 g/dL


(6.4-8.2) 


 





 


Albumin


 


 1.9 g/dL


(3.4-5.0) 


 





 


Albumin/Globulin Ratio  0.4 (1.0-1.7)   


 


Prothrombin Time


 


 


 15.3 SEC


(11.7-14.0) 





 


Prothromb Time International


Ratio 


 


 1.3 (0.8-1.1) 


 





 


Lactic Acid Level


 


 


 1.9 mmol/L


(0.4-2.0) 





 


Test


 6/6/20


05:53 6/6/20


05:55 6/6/20


06:45 





 


Glucose (Fingerstick)


 216 mg/dL


(70-99) 


 


 





 


Sodium Level


 


 147 mmol/L


(136-145) 


 





 


Potassium Level


 


 5.1 mmol/L


(3.5-5.1) 


 





 


Chloride Level


 


 113 mmol/L


() 


 





 


Carbon Dioxide Level


 


 26 mmol/L


(21-32) 


 





 


Anion Gap  8 (6-14)   


 


Blood Urea Nitrogen


 


 69 mg/dL


(7-20) 


 





 


Creatinine


 


 1.9 mg/dL


(0.6-1.0) 


 





 


Estimated GFR


(Cockcroft-Gault) 


 28.1 


 


 





 


Glucose Level


 


 240 mg/dL


(70-99) 


 





 


Calcium Level


 


 10.4 mg/dL


(8.5-10.1) 


 





 


Phosphorus Level


 


 4.0 mg/dL


(2.6-4.7) 


 





 


Magnesium Level


 


 2.4 mg/dL


(1.8-2.4) 


 





 


Amylase Level


 


 385 U/L


() 


 





 


Lipase


 


 563 U/L


() 


 





 


White Blood Count


 


 


 21.2 x10^3/uL


(4.0-11.0) 





 


Red Blood Count


 


 


 2.44 x10^6/uL


(3.50-5.40) 





 


Hemoglobin


 


 


 7.0 g/dL


(12.0-15.5) 





 


Hematocrit


 


 


 22.2 %


(36.0-47.0) 





 


Mean Corpuscular Volume   91 fL ()  


 


Mean Corpuscular Hemoglobin   29 pg (25-35)  


 


Mean Corpuscular Hemoglobin


Concent 


 


 31 g/dL


(31-37) 





 


Red Cell Distribution Width


 


 


 21.0 %


(11.5-14.5) 





 


Platelet Count


 


 


 570 x10^3/uL


(140-400) 





 


Neutrophils (%) (Auto)   85 % (31-73)  


 


Lymphocytes (%) (Auto)   11 % (24-48)  


 


Monocytes (%) (Auto)   4 % (0-9)  


 


Eosinophils (%) (Auto)   0 % (0-3)  


 


Basophils (%) (Auto)   0 % (0-3)  


 


Neutrophils # (Auto)


 


 


 18.0 x10^3/uL


(1.8-7.7) 





 


Lymphocytes # (Auto)


 


 


 2.2 x10^3/uL


(1.0-4.8) 





 


Monocytes # (Auto)


 


 


 0.9 x10^3/uL


(0.0-1.1) 





 


Eosinophils # (Auto)


 


 


 0.1 x10^3/uL


(0.0-0.7) 





 


Basophils # (Auto)


 


 


 0.0 x10^3/uL


(0.0-0.2) 











Microbiology


5/30/20 Gram Stain - Final, Complete


          


5/30/20 Aerobic Culture - Final, Complete


          


5/17/20 Blood Culture - Final, Complete


          NO GROWTH AFTER 5 DAYS


5/6/20 Fungal Culture - Final, Complete


         


5/6/20 Fungal Culture Result 1 - Final, Complete


         


4/12/20 Urine Culture - Final, Complete


          


4/12/20 Urine Culture Result 1 ([facility]) - Final, Complete


Medications





Current Medications


Sodium Chloride 1,000 ml @  1,000 mls/hr Q1H IV  Last administered on 3/16/20at 

03:00;  Start 3/16/20 at 03:00;  Stop 3/16/20 at 03:59;  Status DC


Ondansetron HCl (Zofran) 4 mg 1X  ONCE IVP  Last administered on 3/16/20at 

03:27;  Start 3/16/20 at 03:00;  Stop 3/16/20 at 03:01;  Status DC


Morphine Sulfate (Morphine Sulfate) 4 mg 1X  ONCE IV ;  Start 3/16/20 at 03:00; 

Stop 3/16/20 at 03:01;  Status Cancel


Ketorolac Tromethamine (Toradol 30mg Vial) 30 mg 1X  ONCE IV  Last administered 

on 3/16/20at 02:54;  Start 3/16/20 at 03:00;  Stop 3/16/20 at 03:01;  Status DC


Fentanyl Citrate (Fentanyl 2ml Vial) 25 mcg 1X  ONCE IVP  Last administered on 

3/16/20at 03:23;  Start 3/16/20 at 03:30;  Stop 3/16/20 at 03:31;  Status DC


Fentanyl Citrate (Fentanyl 2ml Vial) 100 mcg STK-MED ONCE .ROUTE ;  Start 

3/16/20 at 03:18;  Stop 3/16/20 at 03:18;  Status DC


Iohexol (Omnipaque 350 Mg/ml) 90 ml 1X  ONCE IV  Last administered on 3/16/20at 

03:25;  Start 3/16/20 at 03:30;  Stop 3/16/20 at 03:31;  Status DC


Info (CONTRAST GIVEN -- Rx MONITORING) 1 each PRN DAILY  PRN MC SEE COMMENTS;  

Start 3/16/20 at 03:30;  Stop 3/18/20 at 03:29;  Status DC


Hydromorphone HCl (Dilaudid) 0.5 mg 1X  ONCE IV  Last administered on 3/16/20at 

03:55;  Start 3/16/20 at 04:30;  Stop 3/16/20 at 04:32;  Status DC


Ondansetron HCl (Zofran) 4 mg PRN Q8HRS  PRN IV NAUSEA/VOMITING 1ST CHOICE;  

Start 3/16/20 at 05:00;  Stop 3/16/20 at 09:27;  Status DC


Morphine Sulfate (Morphine Sulfate) 2 mg PRN Q2HR  PRN IV SEVERE PAIN 7-10 Last 

administered on 3/17/20at 12:26;  Start 3/16/20 at 05:00;  Stop 3/17/20 at 

14:15;  Status DC


Sodium Chloride 1,000 ml @  125 mls/hr Q8H IV  Last administered on 3/16/20at 

20:56;  Start 3/16/20 at 05:00;  Stop 3/17/20 at 04:59;  Status DC


Hydromorphone HCl (Dilaudid) 0.5 mg PRN Q3HRS  PRN IV SEVERE PAIN 7-10 Last 

administered on 3/17/20at 10:06;  Start 3/16/20 at 05:00;  Stop 3/17/20 at 

12:01;  Status DC


Piperacillin Sod/ Tazobactam Sod 4.5 gm/Sodium Chloride 100 ml @  200 mls/hr 1X 

ONCE IV  Last administered on 3/16/20at 05:44;  Start 3/16/20 at 06:00;  Stop 

3/16/20 at 06:29;  Status DC


Ondansetron HCl (Zofran) 4 mg PRN Q4HRS  PRN IV NAUSEA/VOMITING 1ST CHOICE Last 

administered on 6/6/20at 06:27;  Start 3/16/20 at 09:30


Insulin Human Lispro (HumaLOG) 0-9 UNITS Q6HRS SQ  Last administered on 6/6/20at

05:54;  Start 3/16/20 at 09:30


Dextrose (Dextrose 50%-Water Syringe) 12.5 gm PRN Q15MIN  PRN IV SEE COMMENTS;  

Start 3/16/20 at 09:30


Pantoprazole Sodium (PROTONIX VIAL for IV PUSH) 40 mg DAILYAC IVP  Last 

administered on 6/6/20at 07:30;  Start 3/16/20 at 11:30


Prochlorperazine Edisylate (Compazine) 10 mg PRN Q6HRS  PRN IV NAUSEA/VOMITING, 

2nd CHOICE Last administered on 5/30/20at 01:26;  Start 3/16/20 at 17:45


Atenolol (Tenormin) 100 mg DAILY PO ;  Start 3/17/20 at 09:00;  Stop 3/16/20 at 

20:08;  Status DC


Metoprolol Tartrate (Lopressor Vial) 2.5 mg Q6HRS IVP  Last administered on 

3/17/20at 05:51;  Start 3/16/20 at 20:15;  Stop 3/17/20 at 10:02;  Status DC


Metoprolol Tartrate (Lopressor Vial) 5 mg Q6HRS IVP  Last administered on 

3/26/20at 00:12;  Start 3/17/20 at 10:15;  Stop 3/28/20 at 08:48;  Status DC


Hydromorphone HCl (Dilaudid) 1 mg PRN Q3HRS  PRN IV SEVERE PAIN 7-10 Last 

administered on 3/23/20at 05:13;  Start 3/17/20 at 12:00;  Stop 3/31/20 at 

00:25;  Status DC


Lidocaine HCl (Buffered Lidocaine 1%) 3 ml STK-MED ONCE .ROUTE ;  Start 3/17/20 

at 12:55;  Stop 3/17/20 at 12:56;  Status DC


Albumin Human 500 ml @  125 mls/hr 1X  ONCE IV  Last administered on 3/17/20at 

14:33;  Start 3/17/20 at 14:30;  Stop 3/17/20 at 18:32;  Status DC


Norepinephrine Bitartrate 8 mg/ Dextrose 258 ml @  17.299 mls/ hr CONT  PRN IV 

PER PROTOCOL Last administered on 4/14/20at 12:48;  Start 3/17/20 at 15:30;  

Stop 4/17/20 at 09:19;  Status DC


Sodium Chloride 1,000 ml @  125 mls/hr Q8H IV  Last administered on 3/17/20at 2

1:04;  Start 3/17/20 at 16:00;  Stop 3/18/20 at 02:42;  Status DC


Albumin Human 500 ml @  125 mls/hr PRN BID  PRN IV After every 2L NSS & BP < 

90mm Last administered on 6/6/20at 11:40;  Start 3/17/20 at 16:00


Iohexol (Omnipaque 300 Mg/ml) 60 ml 1X  ONCE IV  Last administered on 3/17/20at 

17:20;  Start 3/17/20 at 17:00;  Stop 3/17/20 at 17:01;  Status DC


Info (CONTRAST GIVEN -- Rx MONITORING) 1 each PRN DAILY  PRN MC SEE COMMENTS;  

Start 3/17/20 at 17:00;  Stop 3/19/20 at 16:59;  Status DC


Meropenem 1 gm/ Sodium Chloride 100 ml @  200 mls/hr Q8HRS IV  Last administered

on 3/18/20at 05:45;  Start 3/17/20 at 20:00;  Stop 3/18/20 at 08:48;  Status DC


Furosemide (Lasix) 40 mg 1X  ONCE IVP  Last administered on 3/17/20at 22:12;  

Start 3/17/20 at 22:30;  Stop 3/17/20 at 22:31;  Status DC


Calcium Chloride 1000 mg/Sodium Chloride 110 ml @  220 mls/hr 1X  ONCE IV  Last 

administered on 3/17/20at 22:11;  Start 3/17/20 at 22:30;  Stop 3/17/20 at 

22:59;  Status DC


Albuterol Sulfate (Ventolin Neb Soln) 2.5 mg 1X  ONCE NEB  Last administered on 

3/18/20at 00:56;  Start 3/17/20 at 22:30;  Stop 3/17/20 at 22:31;  Status DC


Insulin Human Regular (HumuLIN R VIAL) 5 unit 1X  ONCE IV  Last administered on 

3/17/20at 22:14;  Start 3/17/20 at 22:30;  Stop 3/17/20 at 22:31;  Status DC


Magnesium Sulfate 50 ml @ 25 mls/hr 1X  ONCE IV  Last administered on 3/18/20at 

02:57;  Start 3/18/20 at 03:00;  Stop 3/18/20 at 04:59;  Status DC


Calcium Gluconate 1000 mg/Sodium Chloride 110 ml @  220 mls/hr 1X  ONCE IV  Last

administered on 3/18/20at 02:46;  Start 3/18/20 at 03:00;  Stop 3/18/20 at 

03:29;  Status DC


Sodium Chloride 1,000 ml @  200 mls/hr Q5H IV  Last administered on 3/18/20at 

02:46;  Start 3/18/20 at 03:00;  Stop 3/18/20 at 10:21;  Status DC


Calcium Gluconate 1000 mg/Sodium Chloride 110 ml @  220 mls/hr 1X  ONCE IV  Last

administered on 3/18/20at 03:21;  Start 3/18/20 at 03:30;  Stop 3/18/20 at 

03:59;  Status DC


Sodium Bicarbonate 50 meq/Sodium Chloride 1,050 ml @  75 mls/hr Q14H IV  Last 

administered on 3/22/20at 21:10;  Start 3/18/20 at 07:30;  Stop 3/23/20 at 

10:28;  Status DC


Calcium Gluconate 2000 mg/Sodium Chloride 120 ml @  220 mls/hr 1X  ONCE IV  Last

administered on 3/18/20at 09:05;  Start 3/18/20 at 07:30;  Stop 3/18/20 at 

08:02;  Status DC


Lidocaine HCl (Xylocaine-Mpf 1% 2ml Vial) 2 ml STK-MED ONCE .ROUTE ;  Start 

3/18/20 at 08:47;  Stop 3/18/20 at 08:47;  Status DC


Meropenem 500 mg/ Sodium Chloride 50 ml @  100 mls/hr Q12HR IV  Last 

administered on 3/23/20at 21:01;  Start 3/18/20 at 18:00;  Stop 3/24/20 at 

07:58;  Status DC


Lidocaine HCl (Buffered Lidocaine 1%) 3 ml STK-MED ONCE .ROUTE ;  Start 3/18/20 

at 09:46;  Stop 3/18/20 at 09:46;  Status DC


Lidocaine HCl (Buffered Lidocaine 1%) 6 ml 1X  ONCE INJ  Last administered on 

3/18/20at 10:26;  Start 3/18/20 at 10:15;  Stop 3/18/20 at 10:16;  Status DC


Info (Tpn Per Pharmacy) 1 each PRN DAILY  PRN MC SEE COMMENTS Last administered 

on 6/6/20at 10:05;  Start 3/18/20 at 12:00


Sodium Chloride 1,000 ml @  1,000 mls/hr Q1H PRN IV hypotension;  Start 3/18/20 

at 12:07;  Stop 3/18/20 at 18:06;  Status DC


Diphenhydramine HCl (Benadryl) 25 mg 1X PRN  PRN IV ITCHING;  Start 3/18/20 at 

12:15;  Stop 3/19/20 at 12:14;  Status DC


Diphenhydramine HCl (Benadryl) 25 mg 1X PRN  PRN IV ITCHING;  Start 3/18/20 at 

12:15;  Stop 3/19/20 at 12:14;  Status DC


Sodium Chloride 1,000 ml @  400 mls/hr Q2H30M PRN IV PATENCY;  Start 3/18/20 at 

12:07;  Stop 3/19/20 at 00:06;  Status DC


Info (PHARMACY MONITORING -- do not chart) 1 each PRN DAILY  PRN MC SEE 

COMMENTS;  Start 3/18/20 at 12:15;  Stop 3/20/20 at 08:13;  Status DC


Sodium Chloride 90 meq/Calcium Gluconate 10 meq/ Multivitamins 10 ml/Chromium/ 

Copper/Manganese/ Seleni/Zn 1 ml/ Total Parenteral Nutrition/Amino 

Acids/Dextrose/ Fat Emulsion Intravenous 55.005 ml  @ 2.292 mls/hr TPN  CONT IV 

;  Start 3/18/20 at 22:00;  Stop 3/18/20 at 12:33;  Status DC


Info (Tpn Per Pharmacy) 1 each PRN DAILY  PRN MC SEE COMMENTS;  Start 3/18/20 at

12:30;  Status UNV


Sodium Chloride 90 meq/Calcium Gluconate 10 meq/ Multivitamins 10 ml/Chromium/ 

Copper/Manganese/ Seleni/Zn 0.5 ml/ Total Parenteral Nutrition/Amino 

Acids/Dextrose/ Fat Emulsion Intravenous 1,512 ml @  63 mls/hr TPN  CONT IV  

Last administered on 3/18/20at 22:06;  Start 3/18/20 at 22:00;  Stop 3/19/20 at 

21:59;  Status DC


Calcium Carbonate/ Glycine (Tums) 500 mg PRN AFTMEALHC  PRN PO INDIGESTION;  

Start 3/18/20 at 17:45;  Stop 5/13/20 at 10:25;  Status DC


Calcium Gluconate (Calcium Gluconate) 2,000 mg 1X  ONCE IVP  Last administered 

on 3/19/20at 02:19;  Start 3/19/20 at 02:15;  Stop 3/19/20 at 02:16;  Status DC


Calcium Chloride 3000 mg/Sodium Chloride 1,030 ml @  50 mls/hr [id number] IV  Last 

administered on 3/21/20at 02:17;  Start 3/19/20 at 08:00;  Stop 3/21/20 at 

15:23;  Status DC


Lorazepam (Ativan Inj) 1 mg PRN Q4HRS  PRN IVP ANXIETY / AGITATION, 2nd choic 

Last administered on 4/17/20at 03:51;  Start 3/19/20 at 09:00;  Stop 4/17/20 at 

09:19;  Status DC


Sodium Chloride 1,000 ml @  1,000 mls/hr Q1H PRN IV hypotension;  Start 3/19/20 

at 08:56;  Stop 3/19/20 at 14:55;  Status DC


Albumin Human 200 ml @  200 mls/hr 1X PRN  PRN IV Hypotension;  Start 3/19/20 at

09:00;  Stop 3/19/20 at 14:59;  Status DC


Diphenhydramine HCl (Benadryl) 25 mg 1X PRN  PRN IV ITCHING;  Start 3/19/20 at 

09:00;  Stop 3/20/20 at 08:59;  Status DC


Diphenhydramine HCl (Benadryl) 25 mg 1X PRN  PRN IV ITCHING;  Start 3/19/20 at 

09:00;  Stop 3/20/20 at 08:59;  Status DC


Sodium Chloride 1,000 ml @  400 mls/hr Q2H30M PRN IV PATENCY;  Start 3/19/20 at 

08:56;  Stop 3/19/20 at 20:55;  Status DC


Info (PHARMACY MONITORING -- do not chart) 1 each PRN DAILY  PRN MC SEE COMM

ENTS;  Start 3/19/20 at 09:00;  Status UNV


Info (PHARMACY MONITORING -- do not chart) 1 each PRN DAILY  PRN MC SEE 

COMMENTS;  Start 3/19/20 at 09:00;  Stop 3/20/20 at 08:13;  Status DC


Digoxin (Lanoxin) 500 mcg 1X  ONCE IV  Last administered on 3/19/20at 10:04;  

Start 3/19/20 at 10:00;  Stop 3/19/20 at 10:01;  Status DC


Digoxin (Lanoxin) 125 mcg 1X  ONCE IV  Last administered on 3/19/20at 17:10;  

Start 3/19/20 at 18:00;  Stop 3/19/20 at 18:01;  Status DC


Magnesium Sulfate 100 ml @  25 mls/hr 1X  ONCE IV  Last administered on 

3/19/20at 12:48;  Start 3/19/20 at 13:00;  Stop 3/19/20 at 16:59;  Status DC


Sodium Chloride 90 meq/Magnesium Sulfate 10 meq/ Calcium Gluconate 20 meq/ 

Multivitamins 10 ml/Chromium/ Copper/Manganese/ Seleni/Zn 0.5 ml/ Total 

Parenteral Nutrition/Amino Acids/Dextrose/ Fat Emulsion Intravenous 1,512 ml @  

63 mls/hr TPN  CONT IV  Last administered on 3/19/20at 22:25;  Start 3/19/20 at 

22:00;  Stop 3/20/20 at 21:59;  Status DC


Sodium Chloride 1,000 ml @  1,000 mls/hr Q1H PRN IV hypotension;  Start 3/20/20 

at 08:05;  Stop 3/20/20 at 14:04;  Status DC


Albumin Human 200 ml @  200 mls/hr 1X  ONCE IV  Last administered on 3/20/20at 

08:57;  Start 3/20/20 at 08:15;  Stop 3/20/20 at 09:14;  Status DC


Diphenhydramine HCl (Benadryl) 25 mg 1X PRN  PRN IV ITCHING;  Start 3/20/20 at 

08:15;  Stop 3/21/20 at 08:14;  Status DC


Diphenhydramine HCl (Benadryl) 25 mg 1X PRN  PRN IV ITCHING;  Start 3/20/20 at 

08:15;  Stop 3/21/20 at 08:14;  Status DC


Sodium Chloride 1,000 ml @  400 mls/hr Q2H30M PRN IV PATENCY;  Start 3/20/20 at 

08:05;  Stop 3/20/20 at 20:04;  Status DC


Info (PHARMACY MONITORING -- do not chart) 1 each PRN DAILY  PRN MC SEE 

COMMENTS;  Start 3/20/20 at 08:15;  Stop 3/24/20 at 07:57;  Status DC


Sodium Chloride 90 meq/Potassium Chloride 15 meq/ Potassium Phosphate 10 mmol/ 

Magnesium Sulfate 10 meq/Calcium Gluconate 20 meq/ Multivitamins 10 ml/Chromium/

Copper/Manganese/ Seleni/Zn 0.5 ml/ Total Parenteral Nutrition/Amino 

Acids/Dextrose/ Fat Emulsion Intravenous 1,512 ml @  63 mls/hr TPN  CONT IV  

Last administered on 3/20/20at 21:01;  Start 3/20/20 at 22:00;  Stop 3/21/20 at 

21:59;  Status DC


Potassium Chloride/Water 100 ml @  100 mls/hr 1X  ONCE IV  Last administered on 

3/20/20at 14:09;  Start 3/20/20 at 14:00;  Stop 3/20/20 at 14:59;  Status DC


Benzocaine (Hurricaine One) 1 spray 1X  ONCE MM  Last administered on 3/20/20at 

16:38;  Start 3/20/20 at 14:30;  Stop 3/20/20 at 14:31;  Status DC


Lidocaine HCl (Glydo (Lidocaine) Jelly) 1 thomas 1X  ONCE MM  Last administered on 

3/20/20at 16:38;  Start 3/20/20 at 14:30;  Stop 3/20/20 at 14:31;  Status DC


Linezolid/Dextrose 300 ml @  300 mls/hr Q12HR IV  Last administered on 3/26/20at

21:04;  Start 3/20/20 at 20:00;  Stop 3/27/20 at 07:50;  Status DC


Acetaminophen (Tylenol) 650 mg PRN Q6HRS  PRN PO MILD PAIN / TEMP;  Start 

3/21/20 at 03:30;  Stop 3/21/20 at 03:36;  Status DC


Acetaminophen (Tylenol) 650 mg PRN Q6HRS  PRN PEG MILD PAIN / TEMP Last 

administered on 4/16/20at 19:56;  Start 3/21/20 at 03:36;  Stop 5/13/20 at 

10:25;  Status DC


Sodium Chloride 1,000 ml @  1,000 mls/hr Q1H PRN IV hypotension;  Start 3/21/20 

at 07:50;  Stop 3/21/20 at 13:49;  Status DC


Albumin Human 200 ml @  200 mls/hr 1X PRN  PRN IV Hypotension;  Start 3/21/20 at

08:00;  Stop 3/21/20 at 13:59;  Status DC


Sodium Chloride (Normal Saline Flush) 10 ml 1X PRN  PRN IV AP catheter pack;  

Start 3/21/20 at 08:00;  Stop 3/22/20 at 07:59;  Status DC


Sodium Chloride (Normal Saline Flush) 10 ml 1X PRN  PRN IV  catheter pack;  

Start 3/21/20 at 08:00;  Stop 3/22/20 at 07:59;  Status DC


Sodium Chloride 1,000 ml @  400 mls/hr Q2H30M PRN IV PATENCY;  Start 3/21/20 at 

07:50;  Stop 3/21/20 at 19:49;  Status DC


Info (PHARMACY MONITORING -- do not chart) 1 each PRN DAILY  PRN MC SEE 

COMMENTS;  Start 3/21/20 at 08:00;  Status UNV


Info (PHARMACY MONITORING -- do not chart) 1 each PRN DAILY  PRN MC SEE 

COMMENTS;  Start 3/21/20 at 08:00;  Stop 3/23/20 at 08:25;  Status DC


Sodium Chloride 90 meq/Potassium Chloride 15 meq/ Potassium Phosphate 10 mmol/ 

Magnesium Sulfate 10 meq/Calcium Gluconate 20 meq/ Multivitamins 10 ml/Chromium/

Copper/Manganese/ Seleni/Zn 0.5 ml/ Total Parenteral Nutrition/Amino 

Acids/Dextrose/ Fat Emulsion Intravenous 1,512 ml @  63 mls/hr TPN  CONT IV  

Last administered on 3/21/20at 20:57;  Start 3/21/20 at 22:00;  Stop 3/22/20 at 

21:59;  Status DC


Sodium Chloride 90 meq/Potassium Chloride 15 meq/ Potassium Phosphate 15 mmol/ 

Magnesium Sulfate 10 meq/Calcium Gluconate 20 meq/ Multivitamins 10 ml/Chromium/

Copper/Manganese/ Seleni/Zn 0.5 ml/ Total Parenteral Nutrition/Amino 

Acids/Dextrose/ Fat Emulsion Intravenous 1,512 ml @  63 mls/hr TPN  CONT IV ;  

Start 3/22/20 at 22:00;  Stop 3/22/20 at 14:16;  Status DC


Sodium Chloride 90 meq/Potassium Chloride 15 meq/ Potassium Phosphate 15 mmol/ 

Magnesium Sulfate 10 meq/Calcium Gluconate 20 meq/ Multivitamins 10 ml/Chromium/

Copper/Manganese/ Seleni/Zn 0.5 ml/ Total Parenteral Nutrition/Amino 

Acids/Dextrose/ Fat Emulsion Intravenous 1,200 ml @  50 mls/hr TPN  CONT IV ;  

Start 3/22/20 at 22:00;  Stop 3/22/20 at 14:17;  Status DC


Sodium Chloride 90 meq/Potassium Chloride 15 meq/ Potassium Phosphate 10 mmol/ 

Magnesium Sulfate 10 meq/Calcium Gluconate 20 meq/ Multivitamins 10 ml/Chromium/

Copper/Manganese/ Seleni/Zn 0.5 ml/ Total Parenteral Nutrition/Amino 

Acids/Dextrose/ Fat Emulsion Intravenous 1,200 ml @  50 mls/hr TPN  CONT IV  

Last administered on 3/22/20at 23:29;  Start 3/22/20 at 22:00;  Stop 3/23/20 at 

21:59;  Status DC


Sodium Chloride 1,000 ml @  1,000 mls/hr Q1H PRN IV hypotension;  Start 3/23/20 

at 07:28;  Stop 3/23/20 at 13:27;  Status DC


Albumin Human 200 ml @  200 mls/hr 1X  ONCE IV  Last administered on 3/23/20at 

08:51;  Start 3/23/20 at 07:30;  Stop 3/23/20 at 08:29;  Status DC


Diphenhydramine HCl (Benadryl) 25 mg 1X PRN  PRN IV ITCHING;  Start 3/23/20 at 

07:30;  Stop 3/24/20 at 07:29;  Status DC


Diphenhydramine HCl (Benadryl) 25 mg 1X PRN  PRN IV ITCHING;  Start 3/23/20 at 

07:30;  Stop 3/24/20 at 07:29;  Status DC


Sodium Chloride 1,000 ml @  400 mls/hr Q2H30M PRN IV PATENCY;  Start 3/23/20 at 

07:28;  Stop 3/23/20 at 19:27;  Status DC


Info (PHARMACY MONITORING -- do not chart) 1 each PRN DAILY  PRN MC SEE 

COMMENTS;  Start 3/23/20 at 07:30;  Stop 4/3/20 at 13:01;  Status DC


Metronidazole 100 ml @  100 mls/hr Q6HRS IV  Last administered on 4/8/20at 

06:26;  Start 3/23/20 at 08:30;  Stop 4/8/20 at 09:58;  Status DC


Micafungin Sodium 100 mg/Dextrose 100 ml @  100 mls/hr Q24H IV  Last 

administered on 4/30/20at 08:18;  Start 3/23/20 at 09:00;  Stop 4/30/20 at 

20:58;  Status DC


Propofol 0 ml @ As Directed STK-MED ONCE IV ;  Start 3/23/20 at 07:53;  Stop 

3/23/20 at 07:53;  Status DC


Etomidate (Amidate) 20 mg STK-MED ONCE IV ;  Start 3/23/20 at 07:53;  Stop 

3/23/20 at 07:54;  Status DC


Midazolam HCl (Versed) 5 mg STK-MED ONCE .ROUTE ;  Start 3/23/20 at 07:57;  Stop

3/23/20 at 07:57;  Status DC


Fentanyl Citrate 30 ml @ 0 mls/hr CONT  PRN IV SEE PROTOCOL Last administered on

4/17/20at 06:12;  Start 3/23/20 at 08:15;  Stop 4/17/20 at 09:19;  Status DC


Artificial Tears (Artificial Tears) 1 drop PRN Q1HR  PRN OU DRY EYE, 1st choice;

 Start 3/23/20 at 08:15;  Stop 4/29/20 at 05:31;  Status DC


Midazolam HCl 50 mg/Sodium Chloride 50 ml @ 0 mls/hr CONT  PRN IV SEE PROTOCOL 

Last administered on 3/26/20at 22:39;  Start 3/23/20 at 08:15;  Stop 3/28/20 at 

15:59;  Status DC


Etomidate (Amidate) 8 mg 1X  ONCE IV  Last administered on 3/23/20at 08:33;  

Start 3/23/20 at 08:30;  Stop 3/23/20 at 08:31;  Status DC


Succinylcholine Chloride (Anectine) 120 mg 1X  ONCE IV  Last administered on 

3/23/20at 08:34;  Start 3/23/20 at 08:30;  Stop 3/23/20 at 08:31;  Status DC


Midazolam HCl (Versed) 5 mg 1X  ONCE IV ;  Start 3/23/20 at 08:30;  Stop 3/23/20

at 08:31;  Status DC


Potassium Chloride 15 meq/ Bicarbonate Dialysis Soln w/ out KCl 5,007.5 ml  @ 

1,000 mls/ hr Q5H1M IV  Last administered on 3/24/20at 11:11;  Start 3/23/20 at 

12:00;  Stop 3/24/20 at 11:15;  Status DC


Potassium Chloride 15 meq/ Bicarbonate Dialysis Soln w/ out KCl 5,007.5 ml  @ 

1,000 mls/ hr Q5H1M IV  Last administered on 3/24/20at 11:12;  Start 3/23/20 at 

12:00;  Stop 3/24/20 at 11:17;  Status DC


Potassium Chloride 15 meq/ Bicarbonate Dialysis Soln w/ out KCl 5,007.5 ml  @ 

1,000 mls/ hr Q5H1M IV  Last administered on 3/24/20at 11:11;  Start 3/23/20 at 

12:00;  Stop 3/24/20 at 11:19;  Status DC


Sodium Chloride 90 meq/Potassium Chloride 15 meq/ Potassium Phosphate 10 mmol/ 

Magnesium Sulfate 10 meq/Calcium Gluconate 20 meq/ Multivitamins 10 ml/Chromium/

Copper/Manganese/ Seleni/Zn 0.5 ml/ Total Parenteral Nutrition/Amino 

Acids/Dextrose/ Fat Emulsion Intravenous 1,400 ml @  58.333 mls/ hr TPN  CONT IV

 Last administered on 3/23/20at 21:42;  Start 3/23/20 at 22:00;  Stop 3/24/20 at

21:59;  Status DC


Heparin Sodium (Porcine) (Heparin Sodium) 5,000 unit Q8HRS SQ  Last administered

on 3/28/20at 05:55;  Start 3/23/20 at 15:00;  Stop 3/28/20 at 13:28;  Status DC


Meropenem 500 mg/ Sodium Chloride 50 ml @  100 mls/hr Q6HRS IV  Last 

administered on 3/25/20at 06:00;  Start 3/24/20 at 09:00;  Stop 3/25/20 at 

07:29;  Status DC


Potassium Phosphate 20 mmol/ Sodium Chloride 106.6667 ml @  51.667 m... 1X  ONCE

IV  Last administered on 3/24/20at 11:22;  Start 3/24/20 at 10:15;  Stop 3/24/20

at 12:18;  Status DC


Acetaminophen (Tylenol Supp) 650 mg PRN Q6HRS  PRN MA MILD PAIN / TEMP > 100.3'F

Last administered on 6/5/20at 23:37;  Start 3/24/20 at 10:30


Potassium Chloride/Water 100 ml @  100 mls/hr Q1H IV  Last administered on 

3/24/20at 12:12;  Start 3/24/20 at 11:00;  Stop 3/24/20 at 12:59;  Status DC


Potassium Chloride 20 meq/ Bicarbonate Dialysis Soln w/ out KCl 5,010 ml @  

1,000 mls/hr Q5H1M IV  Last administered on 3/25/20at 08:48;  Start 3/24/20 at 

12:00;  Stop 3/25/20 at 13:03;  Status DC


Potassium Chloride 20 meq/ Bicarbonate Dialysis Soln w/ out KCl 5,010 ml @  

1,000 mls/hr Q5H1M IV  Last administered on 3/29/20at 14:52;  Start 3/24/20 at 

11:30;  Stop 3/29/20 at 19:59;  Status DC


Potassium Chloride 20 meq/ Bicarbonate Dialysis Soln w/ out KCl 5,010 ml @  

1,000 mls/hr Q5H1M IV  Last administered on 3/29/20at 14:53;  Start 3/24/20 at 1

1:30;  Stop 3/29/20 at 19:59;  Status DC


Sodium Chloride 90 meq/Potassium Chloride 15 meq/ Potassium Phosphate 15 mmol/ 

Magnesium Sulfate 10 meq/Calcium Gluconate 15 meq/ Multivitamins 10 ml/Chromium/

Copper/Manganese/ Seleni/Zn 0.5 ml/ Total Parenteral Nutrition/Amino 

Acids/Dextrose/ Fat Emulsion Intravenous 1,400 ml @  58.333 mls/ hr TPN  CONT IV

 Last administered on 3/24/20at 22:17;  Start 3/24/20 at 22:00;  Stop 3/25/20 at

21:59;  Status DC


Cefepime HCl (Maxipime) 2 gm Q12HR IVP  Last administered on 4/7/20at 20:56;  

Start 3/25/20 at 09:00;  Stop 4/8/20 at 09:58;  Status DC


Daptomycin 500 mg/ Sodium Chloride 50 ml @  100 mls/hr Q48H IV  Last 

administered on 4/10/20at 09:57;  Start 3/25/20 at 08:30;  Stop 4/10/20 at 

10:07;  Status DC


Lidocaine HCl (Buffered Lidocaine 1%) 3 ml 1X  ONCE INJ  Last administered on 

3/25/20at 10:27;  Start 3/25/20 at 10:30;  Stop 3/25/20 at 10:31;  Status DC


Potassium Phosphate 20 mmol/ Sodium Chloride 106.6667 ml @  51.667 m... 1X  ONCE

IV  Last administered on 3/25/20at 12:51;  Start 3/25/20 at 13:00;  Stop 3/25/20

at 15:03;  Status DC


Sodium Chloride 90 meq/Potassium Chloride 15 meq/ Potassium Phosphate 18 mmol/ 

Magnesium Sulfate 8 meq/Calcium Gluconate 15 meq/ Multivitamins 10 ml/Chromium/ 

Copper/Manganese/ Seleni/Zn 0.5 ml/ Total Parenteral Nutrition/Amino 

Acids/Dextrose/ Fat Emulsion Intravenous 1,400 ml @  58.333 mls/ hr TPN  CONT IV

 Last administered on 3/25/20at 22:16;  Start 3/25/20 at 22:00;  Stop 3/26/20 at

21:59;  Status DC


Potassium Chloride 20 meq/ Bicarbonate Dialysis Soln w/ out KCl 5,010 ml @  

1,000 mls/hr Q5H1M IV  Last administered on 3/29/20at 14:54;  Start 3/25/20 at 

16:00;  Stop 3/29/20 at 19:59;  Status DC


Multi-Ingred Cream/Lotion/Oil/ Oint (Artificial Tears Eye Ointment) 1 thomas PRN 

Q1HR  PRN OU DRY EYE, 2nd choice Last administered on 4/13/20at 08:19;  Start 

3/25/20 at 17:30;  Stop 6/3/20 at 14:39;  Status DC


Sodium Chloride 90 meq/Potassium Chloride 15 meq/ Potassium Phosphate 18 mmol/ 

Magnesium Sulfate 8 meq/Calcium Gluconate 15 meq/ Multivitamins 10 ml/Chromium/ 

Copper/Manganese/ Seleni/Zn 0.5 ml/ Total Parenteral Nutrition/Amino 

Acids/Dextrose/ Fat Emulsion Intravenous 1,400 ml @  58.333 mls/ hr TPN  CONT IV

 Last administered on 3/26/20at 22:00;  Start 3/26/20 at 22:00;  Stop 3/27/20 at

21:59;  Status DC


Albumin Human 500 ml @  125 mls/hr 1X  ONCE IV ;  Start 3/26/20 at 14:15;  Stop 

3/26/20 at 18:14;  Status DC


Sodium Chloride 90 meq/Potassium Chloride 15 meq/ Potassium Phosphate 18 mmol/ 

Magnesium Sulfate 8 meq/Calcium Gluconate 15 meq/ Multivitamins 10 ml/Chromium/ 

Copper/Manganese/ Seleni/Zn 0.5 ml/ Insulin Human Regular 10 unit/ Total 

Parenteral Nutrition/Amino Acids/Dextrose/ Fat Emulsion Intravenous 1,400 ml @  

58.333 mls/ hr TPN  CONT IV  Last administered on 3/27/20at 21:43;  Start 

3/27/20 at 22:00;  Stop 3/28/20 at 21:59;  Status DC


Lidocaine HCl (Buffered Lidocaine 1%) 3 ml STK-MED ONCE .ROUTE ;  Start 3/25/20 

at 10:00;  Stop 3/27/20 at 13:57;  Status DC


Midazolam HCl 100 mg/Sodium Chloride 100 ml @ 7 mls/hr CONT  PRN IV SEE PROTOCOL

Last administered on 4/8/20at 15:35;  Start 3/28/20 at 16:00;  Stop 6/3/20 at 

14:38;  Status DC


Sodium Chloride 90 meq/Potassium Chloride 15 meq/ Potassium Phosphate 18 mmol/ 

Magnesium Sulfate 8 meq/Calcium Gluconate 15 meq/ Multivitamins 10 ml/Chromium/ 

Copper/Manganese/ Seleni/Zn 0.5 ml/ Insulin Human Regular 15 unit/ Total 

Parenteral Nutrition/Amino Acids/Dextrose/ Fat Emulsion Intravenous 1,400 ml @  

58.333 mls/ hr TPN  CONT IV  Last administered on 3/28/20at 20:34;  Start 

3/28/20 at 22:00;  Stop 3/29/20 at 21:59;  Status DC


Info ([facility] Electrolyte Protocol) 1 ea CONT PRN  PRN MC PER PROTOCOL;  Start 

3/29/20 at 13:15


Sodium Chloride 90 meq/Potassium Chloride 15 meq/ Potassium Phosphate 18 mmol/ 

Magnesium Sulfate 8 meq/Calcium Gluconate 15 meq/ Multivitamins 10 ml/Chromium/ 

Copper/Manganese/ Seleni/Zn 0.5 ml/ Insulin Human Regular 15 unit/ Total 

Parenteral Nutrition/Amino Acids/Dextrose/ Fat Emulsion Intravenous 1,400 ml @  

58.333 mls/ hr TPN  CONT IV  Last administered on 3/29/20at 22:05;  Start 

3/29/20 at 22:00;  Stop 3/30/20 at 21:59;  Status DC


Potassium Chloride 15 meq/ Bicarbonate Dialysis Soln w/ out KCl 5,007.5 ml  @ 

1,000 mls/ hr Q5H1M IV  Last administered on 4/1/20at 18:14;  Start 3/29/20 at 

20:00;  Stop 4/2/20 at 13:08;  Status DC


Potassium Chloride 15 meq/ Bicarbonate Dialysis Soln w/ out KCl 5,007.5 ml  @ 

1,000 mls/ hr Q5H1M IV  Last administered on 4/1/20at 18:14;  Start 3/29/20 at 

20:00;  Stop 4/2/20 at 13:08;  Status DC


Potassium Chloride 15 meq/ Bicarbonate Dialysis Soln w/ out KCl 5,007.5 ml  @ 

1,000 mls/ hr Q5H1M IV  Last administered on 4/1/20at 18:14;  Start 3/29/20 at 

20:00;  Stop 4/2/20 at 13:08;  Status DC


Iohexol (Omnipaque 240 Mg/ml) 30 ml 1X  ONCE PO  Last administered on 3/30/20at 

11:30;  Start 3/30/20 at 11:30;  Stop 3/30/20 at 11:33;  Status DC


Info (CONTRAST GIVEN -- Rx MONITORING) 1 each PRN DAILY  PRN MC SEE COMMENTS;  

Start 3/30/20 at 11:45;  Stop 4/1/20 at 11:44;  Status DC


Sodium Chloride 90 meq/Potassium Chloride 15 meq/ Potassium Phosphate 18 mmol/ 

Magnesium Sulfate 8 meq/Calcium Gluconate 15 meq/ Multivitamins 10 ml/Chromium/ 

Copper/Manganese/ Seleni/Zn 0.5 ml/ Insulin Human Regular 15 unit/ Total 

Parenteral Nutrition/Amino Acids/Dextrose/ Fat Emulsion Intravenous 1,400 ml @  

58.333 mls/ hr TPN  CONT IV  Last administered on 3/30/20at 21:47;  Start 

3/30/20 at 22:00;  Stop 3/31/20 at 21:59;  Status DC


Sodium Chloride 90 meq/Potassium Chloride 15 meq/ Potassium Phosphate 18 mmol/ 

Magnesium Sulfate 8 meq/Calcium Gluconate 15 meq/ Multivitamins 10 ml/Chromium/ 

Copper/Manganese/ Seleni/Zn 0.5 ml/ Insulin Human Regular 20 unit/ Total 

Parenteral Nutrition/Amino Acids/Dextrose/ Fat Emulsion Intravenous 1,400 ml @  

58.333 mls/ hr TPN  CONT IV  Last administered on 3/31/20at 21:36;  Start 

3/31/20 at 22:00;  Stop 4/1/20 at 21:59;  Status DC


Alteplase, Recombinant (Cathflo For Central Catheter Clearance) 1 mg 1X  ONCE 

INT CAT  Last administered on 3/31/20at 20:03;  Start 3/31/20 at 19:30;  Stop 

3/31/20 at 19:46;  Status DC


Alteplase, Recombinant (Cathflo For Central Catheter Clearance) 1 mg 1X  ONCE 

INT CAT  Last administered on 3/31/20at 22:05;  Start 3/31/20 at 22:00;  Stop 

3/31/20 at 22:01;  Status DC


Sodium Chloride 90 meq/Potassium Chloride 15 meq/ Potassium Phosphate 18 mmol/ 

Magnesium Sulfate 8 meq/Calcium Gluconate 15 meq/ Multivitamins 10 ml/Chromium/ 

Copper/Manganese/ Seleni/Zn 0.5 ml/ Insulin Human Regular 20 unit/ Total 

Parenteral Nutrition/Amino Acids/Dextrose/ Fat Emulsion Intravenous 1,400 ml @  

58.333 mls/ hr TPN  CONT IV  Last administered on 4/1/20at 21:30;  Start 4/1/20 

at 22:00;  Stop 4/2/20 at 21:59;  Status DC


Dexmedetomidine HCl 400 mcg/ Sodium Chloride 100 ml @ 0 mls/hr CONT  PRN IV 

ANXIETY / AGITATION Last administered on 5/30/20at 12:57;  Start 4/2/20 at 

08:15;  Stop 5/30/20 at 18:31;  Status DC


Sodium Chloride 500 ml @  500 mls/hr 1X PRN  PRN IV ELEVATED BP, SEE COMMENTS;  

Start 4/2/20 at 08:15


Atropine Sulfate (ATROPINE 0.5mg SYRINGE) 0.5 mg PRN Q5MIN  PRN IV SEE COMMENTS;

 Start 4/2/20 at 08:15


Furosemide (Lasix) 20 mg 1X  ONCE IVP  Last administered on 4/2/20at 08:19;  

Start 4/2/20 at 08:15;  Stop 4/2/20 at 08:16;  Status DC


Lidocaine HCl (Buffered Lidocaine 1%) 3 ml STK-MED ONCE .ROUTE ;  Start 4/2/20 

at 08:39;  Stop 4/2/20 at 08:39;  Status DC


Lidocaine HCl (Buffered Lidocaine 1%) 6 ml 1X  ONCE INJ  Last administered on 4/ 2/20at 09:05;  Start 4/2/20 at 09:00;  Stop 4/2/20 at 09:06;  Status DC


Sodium Chloride 90 meq/Potassium Chloride 15 meq/ Potassium Phosphate 18 mmol/ 

Magnesium Sulfate 8 meq/Calcium Gluconate 15 meq/ Multivitamins 10 ml/Chromium/ 

Copper/Manganese/ Seleni/Zn 0.5 ml/ Insulin Human Regular 20 unit/ Total 

Parenteral Nutrition/Amino Acids/Dextrose/ Fat Emulsion Intravenous 1,400 ml @  

58.333 mls/ hr TPN  CONT IV  Last administered on 4/2/20at 22:45;  Start 4/2/20 

at 22:00;  Stop 4/3/20 at 21:59;  Status DC


Sodium Chloride 1,000 ml @  1,000 mls/hr Q1H PRN IV hypotension;  Start 4/3/20 

at 07:30;  Stop 4/3/20 at 13:29;  Status DC


Albumin Human 200 ml @  200 mls/hr 1X PRN  PRN IV Hypotension Last administered 

on 4/3/20at 09:36;  Start 4/3/20 at 07:30;  Stop 4/3/20 at 13:29;  Status DC


Sodium Chloride (Normal Saline Flush) 10 ml 1X PRN  PRN IV AP catheter pack;  

Start 4/3/20 at 07:30;  Stop 4/3/20 at 21:29;  Status DC


Sodium Chloride (Normal Saline Flush) 10 ml 1X PRN  PRN IV  catheter pack;  

Start 4/3/20 at 07:30;  Stop 4/4/20 at 07:29;  Status DC


Sodium Chloride 1,000 ml @  400 mls/hr Q2H30M PRN IV PATENCY;  Start 4/3/20 at 

07:30;  Stop 4/3/20 at 19:29;  Status DC


Info (PHARMACY MONITORING -- do not chart) 1 each PRN DAILY  PRN MC SEE 

COMMENTS;  Start 4/3/20 at 07:30;  Stop 4/3/20 at 13:02;  Status DC


Info (PHARMACY MONITORING -- do not chart) 1 each PRN DAILY  PRN MC SEE 

COMMENTS;  Start 4/3/20 at 07:30;  Stop 4/5/20 at 12:45;  Status DC


Sodium Chloride 90 meq/Potassium Chloride 15 meq/ Potassium Phosphate 10 mmol/ 

Magnesium Sulfate 8 meq/Calcium Gluconate 15 meq/ Multivitamins 10 ml/Chromium/ 

Copper/Manganese/ Seleni/Zn 0.5 ml/ Insulin Human Regular 25 unit/ Total 

Parenteral Nutrition/Amino Acids/Dextrose/ Fat Emulsion Intravenous 1,400 ml @  

58.333 mls/ hr TPN  CONT IV  Last administered on 4/3/20at 22:19;  Start 4/3/20 

at 22:00;  Stop 4/4/20 at 21:59;  Status DC


Heparin Sodium (Porcine) (Heparin Sodium) 5,000 unit Q12HR SQ  Last administered

on 4/26/20at 08:59;  Start 4/3/20 at 21:00;  Stop 4/26/20 at 10:05;  Status DC


Ondansetron HCl (Zofran) 4 mg PRN Q6HRS  PRN IV NAUSEA/VOMITING;  Start 4/6/20 

at 07:00;  Stop 4/7/20 at 06:59;  Status DC


Fentanyl Citrate (Fentanyl 2ml Vial) 25 mcg PRN Q5MIN  PRN IV MILD PAIN 1-3;  

Start 4/6/20 at 07:00;  Stop 4/7/20 at 06:59;  Status DC


Fentanyl Citrate (Fentanyl 2ml Vial) 50 mcg PRN Q5MIN  PRN IV MODERATE TO SEVERE

PAIN;  Start 4/6/20 at 07:00;  Stop 4/7/20 at 06:59;  Status DC


Ringer's Solution 1,000 ml @  30 mls/hr Q24H IV ;  Start 4/6/20 at 07:00;  Stop 

4/6/20 at 18:59;  Status DC


Lidocaine HCl (Xylocaine-Mpf 1% 2ml Vial) 2 ml PRN 1X  PRN ID PRIOR TO IV START;

 Start 4/6/20 at 07:00;  Stop 4/7/20 at 06:59;  Status DC


Prochlorperazine Edisylate (Compazine) 5 mg PACU PRN  PRN IV NAUSEA, MRX1;  

Start 4/6/20 at 07:00;  Stop 4/7/20 at 06:59;  Status DC


Sodium Chloride 1,000 ml @  1,000 mls/hr Q1H PRN IV hypotension;  Start 4/4/20 

at 09:10;  Stop 4/4/20 at 15:09;  Status DC


Albumin Human 200 ml @  200 mls/hr 1X PRN  PRN IV Hypotension Last administered 

on 4/4/20at 10:10;  Start 4/4/20 at 09:15;  Stop 4/4/20 at 15:14;  Status DC


Sodium Chloride 1,000 ml @  400 mls/hr Q2H30M PRN IV PATENCY;  Start 4/4/20 at 

09:10;  Stop 4/4/20 at 21:09;  Status DC


Info (PHARMACY MONITORING -- do not chart) 1 each PRN DAILY  PRN MC SEE 

COMMENTS;  Start 4/4/20 at 09:15;  Stop 4/5/20 at 12:45;  Status DC


Info (PHARMACY MONITORING -- do not chart) 1 each PRN DAILY  PRN MC SEE COMMENT

S;  Start 4/4/20 at 09:15;  Stop 4/5/20 at 12:45;  Status DC


Sodium Chloride 90 meq/Potassium Chloride 15 meq/ Potassium Phosphate 10 mmol/ 

Magnesium Sulfate 8 meq/Calcium Gluconate 15 meq/ Multivitamins 10 ml/Chromium/ 

Copper/Manganese/ Seleni/Zn 0.5 ml/ Insulin Human Regular 25 unit/ Total 

Parenteral Nutrition/Amino Acids/Dextrose/ Fat Emulsion Intravenous 1,400 ml @  

58.333 mls/ hr TPN  CONT IV  Last administered on 4/4/20at 22:10;  Start 4/4/20 

at 22:00;  Stop 4/5/20 at 21:59;  Status DC


Magnesium Sulfate 50 ml @ 25 mls/hr PRN DAILY  PRN IV for Mag < 1.7 on am labs 

Last administered on 4/20/20at 17:27;  Start 4/5/20 at 09:15


Sodium Chloride 90 meq/Potassium Chloride 15 meq/ Potassium Phosphate 10 mmol/ 

Magnesium Sulfate 8 meq/Calcium Gluconate 15 meq/ Multivitamins 10 ml/Chromium/ 

Copper/Manganese/ Seleni/Zn 0.5 ml/ Insulin Human Regular 25 unit/ Total 

Parenteral Nutrition/Amino Acids/Dextrose/ Fat Emulsion Intravenous 1,400 ml @  

58.333 mls/ hr TPN  CONT IV  Last administered on 4/5/20at 21:20;  Start 4/5/20 

at 22:00;  Stop 4/6/20 at 21:59;  Status DC


Sodium Chloride 1,000 ml @  1,000 mls/hr Q1H PRN IV hypotension;  Start 4/5/20 

at 12:23;  Stop 4/5/20 at 18:22;  Status DC


Albumin Human 200 ml @  200 mls/hr 1X  ONCE IV  Last administered on 4/5/20at 

13:34;  Start 4/5/20 at 12:30;  Stop 4/5/20 at 13:29;  Status DC


Diphenhydramine HCl (Benadryl) 25 mg 1X PRN  PRN IV ITCHING;  Start 4/5/20 at 

12:30;  Stop 4/6/20 at 12:29;  Status DC


Diphenhydramine HCl (Benadryl) 25 mg 1X PRN  PRN IV ITCHING;  Start 4/5/20 at 

12:30;  Stop 4/6/20 at 12:29;  Status DC


Info (PHARMACY MONITORING -- do not chart) 1 each PRN DAILY  PRN MC SEE 

COMMENTS;  Start 4/5/20 at 12:30;  Status Cancel


Bupivacaine HCl/ Epinephrine Bitart (Sensorcain-Epi 0.5%-1:[id number] Mpf) 30 ml 

STK-MED ONCE .ROUTE  Last administered on 4/6/20at 11:44;  Start 4/6/20 at 

11:00;  Stop 4/6/20 at 11:01;  Status DC


Cellulose (Surgicel Fibrillar 1x2) 1 each STK-MED ONCE .ROUTE ;  Start 4/6/20 at

11:00;  Stop 4/6/20 at 11:01;  Status DC


Sodium Chloride 90 meq/Potassium Chloride 15 meq/ Potassium Phosphate 10 mmol/ 

Magnesium Sulfate 12 meq/Calcium Gluconate 15 meq/ Multivitamins 10 ml/Chromium/

Copper/Manganese/ Seleni/Zn 0.5 ml/ Insulin Human Regular 25 unit/ Total 

Parenteral Nutrition/Amino Acids/Dextrose/ Fat Emulsion Intravenous 1,400 ml @  

58.333 mls/ hr TPN  CONT IV  Last administered on 4/6/20at 22:24;  Start 4/6/20 

at 22:00;  Stop 4/7/20 at 21:59;  Status DC


Propofol 20 ml @ As Directed STK-MED ONCE IV ;  Start 4/6/20 at 11:07;  Stop 

4/6/20 at 11:07;  Status DC


Cellulose (Surgicel Hemostat 4x8) 1 each STK-MED ONCE .ROUTE  Last administered 

on 4/6/20at 11:44;  Start 4/6/20 at 11:55;  Stop 4/6/20 at 11:56;  Status DC


Sevoflurane (Ultane) 60 ml STK-MED ONCE IH ;  Start 4/6/20 at 12:46;  Stop 

4/6/20 at 12:46;  Status DC


Sodium Chloride 1,000 ml @  1,000 mls/hr Q1H PRN IV hypotension;  Start 4/6/20 

at 13:51;  Stop 4/6/20 at 19:50;  Status DC


Albumin Human 200 ml @  200 mls/hr 1X PRN  PRN IV Hypotension Last administered 

on 4/6/20at 14:51;  Start 4/6/20 at 14:00;  Stop 4/6/20 at 19:59;  Status DC


Diphenhydramine HCl (Benadryl) 25 mg 1X PRN  PRN IV ITCHING;  Start 4/6/20 at 

14:00;  Stop 4/7/20 at 13:59;  Status DC


Diphenhydramine HCl (Benadryl) 25 mg 1X PRN  PRN IV ITCHING;  Start 4/6/20 at 

14:00;  Stop 4/7/20 at 13:59;  Status DC


Sodium Chloride 1,000 ml @  400 mls/hr Q2H30M PRN IV PATENCY;  Start 4/6/20 at 

13:51;  Stop 4/7/20 at 01:50;  Status DC


Info (PHARMACY MONITORING -- do not chart) 1 each PRN DAILY  PRN MC SEE 

COMMENTS;  Start 4/6/20 at 14:00;  Stop 4/9/20 at 08:16;  Status DC


Heparin Sodium (Porcine) (Hep Lock Adult) 500 unit STK-MED ONCE IVP ;  Start 

4/7/20 at 09:29;  Stop 4/7/20 at 09:30;  Status DC


Sodium Chloride 1,000 ml @  1,000 mls/hr Q1H PRN IV hypotension;  Start 4/7/20 

at 10:43;  Stop 4/7/20 at 16:42;  Status DC


Sodium Chloride 1,000 ml @  400 mls/hr Q2H30M PRN IV PATENCY;  Start 4/7/20 at 

10:43;  Stop 4/7/20 at 22:42;  Status DC


Info (PHARMACY MONITORING -- do not chart) 1 each PRN DAILY  PRN MC SEE 

COMMENTS;  Start 4/7/20 at 10:45;  Status UNV


Info (PHARMACY MONITORING -- do not chart) 1 each PRN DAILY  PRN MC SEE 

COMMENTS;  Start 4/7/20 at 10:45;  Status UNV


Sodium Chloride 90 meq/Potassium Chloride 15 meq/ Magnesium Sulfate 12 

meq/Calcium Gluconate 15 meq/ Multivitamins 10 ml/Chromium/ Copper/Manganese/ 

Seleni/Zn 0.5 ml/ Insulin Human Regular 25 unit/ Total Parenteral 

Nutrition/Amino Acids/Dextrose/ Fat Emulsion Intravenous 1,400 ml @  58.333 mls/

hr TPN  CONT IV  Last administered on 4/7/20at 22:13;  Start 4/7/20 at 22:00;  

Stop 4/8/20 at 21:59;  Status DC


Sodium Chloride 1,000 ml @  1,000 mls/hr Q1H PRN IV hypotension;  Start 4/8/20 

at 07:50;  Stop 4/8/20 at 13:49;  Status DC


Albumin Human 200 ml @  200 mls/hr 1X  ONCE IV ;  Start 4/8/20 at 08:00;  Stop 

4/8/20 at 08:53;  Status DC


Diphenhydramine HCl (Benadryl) 25 mg 1X PRN  PRN IV ITCHING;  Start 4/8/20 at 

08:00;  Stop 4/9/20 at 07:59;  Status DC


Diphenhydramine HCl (Benadryl) 25 mg 1X PRN  PRN IV ITCHING;  Start 4/8/20 at 

08:00;  Stop 4/9/20 at 07:59;  Status DC


Info (PHARMACY MONITORING -- do not chart) 1 each PRN DAILY  PRN MC SEE 

COMMENTS;  Start 4/8/20 at 08:00;  Stop 4/9/20 at 08:16;  Status DC


Albumin Human 50 ml @ 50 mls/hr 1X  ONCE IV ;  Start 4/8/20 at 08:53;  Stop 

4/8/20 at 08:56;  Status DC


Albumin Human 200 ml @  50 mls/hr PRN 1X  PRN IV HYPOTENSION Last administered 

on 4/14/20at 11:54;  Start 4/8/20 at 09:00;  Stop 5/21/20 at 11:14;  Status DC


Meropenem 500 mg/ Sodium Chloride 50 ml @  100 mls/hr Q12H IV  Last administered

on 4/28/20at 10:45;  Start 4/8/20 at 10:00;  Stop 4/28/20 at 12:37;  Status DC


Sodium Chloride 90 meq/Magnesium Sulfate 12 meq/ Calcium Gluconate 15 meq/ 

Multivitamins 10 ml/Chromium/ Copper/Manganese/ Seleni/Zn 0.5 ml/ Insulin Human 

Regular 25 unit/ Total Parenteral Nutrition/Amino Acids/Dextrose/ Fat Emulsion 

Intravenous 1,400 ml @  58.333 mls/ hr TPN  CONT IV  Last administered on 

4/8/20at 21:41;  Start 4/8/20 at 22:00;  Stop 4/9/20 at 21:59;  Status DC


Sodium Chloride 1,000 ml @  1,000 mls/hr Q1H PRN IV hypotension;  Start 4/9/20 

at 07:58;  Stop 4/9/20 at 13:57;  Status DC


Albumin Human 200 ml @  200 mls/hr 1X PRN  PRN IV Hypotension Last administered 

on 4/9/20at 09:30;  Start 4/9/20 at 08:00;  Stop 4/9/20 at 13:59;  Status DC


Sodium Chloride 1,000 ml @  400 mls/hr Q2H30M PRN IV PATENCY;  Start 4/9/20 at 

07:58;  Stop 4/9/20 at 19:57;  Status DC


Info (PHARMACY MONITORING -- do not chart) 1 each PRN DAILY  PRN MC SEE 

COMMENTS;  Start 4/9/20 at 08:00;  Status Cancel


Info (PHARMACY MONITORING -- do not chart) 1 each PRN DAILY  PRN MC SEE 

COMMENTS;  Start 4/9/20 at 08:15;  Status UNV


Sodium Chloride 90 meq/Potassium Phosphate 5 mmol/ Magnesium Sulfate 12 

meq/Calcium Gluconate 15 meq/ Multivitamins 10 ml/Chromium/ Copper/Manganese/ 

Seleni/Zn 0.5 ml/ Insulin Human Regular 30 unit/ Total Parenteral 

Nutrition/Amino Acids/Dextrose/ Fat Emulsion Intravenous 1,400 ml @  58.333 mls/

hr TPN  CONT IV  Last administered on 4/9/20at 22:08;  Start 4/9/20 at 22:00;  

Stop 4/10/20 at 21:59;  Status DC


Linezolid/Dextrose 300 ml @  300 mls/hr Q12HR IV  Last administered on 4/20/20at

20:40;  Start 4/10/20 at 11:00;  Stop 4/21/20 at 08:10;  Status DC


Sodium Chloride 90 meq/Potassium Phosphate 15 mmol/ Magnesium Sulfate 12 meq/Ca

lcium Gluconate 15 meq/ Multivitamins 10 ml/Chromium/ Copper/Manganese/ 

Seleni/Zn 0.5 ml/ Insulin Human Regular 30 unit/ Total Parenteral 

Nutrition/Amino Acids/Dextrose/ Fat Emulsion Intravenous 1,400 ml @  58.333 mls/

hr TPN  CONT IV  Last administered on 4/10/20at 21:49;  Start 4/10/20 at 22:00; 

Stop 4/11/20 at 21:59;  Status DC


Sodium Chloride 90 meq/Potassium Phosphate 15 mmol/ Magnesium Sulfate 12 

meq/Calcium Gluconate 15 meq/ Multivitamins 10 ml/Chromium/ Copper/Manganese/ 

Seleni/Zn 0.5 ml/ Insulin Human Regular 40 unit/ Total Parenteral 

Nutrition/Amino Acids/Dextrose/ Fat Emulsion Intravenous 1,400 ml @  58.333 mls/

hr TPN  CONT IV  Last administered on 4/11/20at 21:21;  Start 4/11/20 at 22:00; 

Stop 4/12/20 at 21:59;  Status DC


Sodium Chloride 1,000 ml @  1,000 mls/hr Q1H PRN IV hypotension;  Start 4/11/20 

at 13:26;  Stop 4/11/20 at 19:25;  Status DC


Albumin Human 200 ml @  200 mls/hr 1X PRN  PRN IV Hypotension Last administered 

on 4/11/20at 15:00;  Start 4/11/20 at 13:30;  Stop 4/11/20 at 19:29;  Status DC


Sodium Chloride (Normal Saline Flush) 10 ml 1X PRN  PRN IV AP catheter pack;  

Start 4/11/20 at 13:30;  Stop 4/12/20 at 13:29;  Status DC


Sodium Chloride (Normal Saline Flush) 10 ml 1X PRN  PRN IV  catheter pack;  

Start 4/11/20 at 13:30;  Stop 4/12/20 at 13:29;  Status DC


Sodium Chloride 1,000 ml @  400 mls/hr Q2H30M PRN IV PATENCY;  Start 4/11/20 at 

13:26;  Stop 4/12/20 at 01:25;  Status DC


Info (PHARMACY MONITORING -- do not chart) 1 each PRN DAILY  PRN MC SEE 

COMMENTS;  Start 4/11/20 at 13:30;  Stop 4/11/20 at 13:33;  Status DC


Info (PHARMACY MONITORING -- do not chart) 1 each PRN DAILY  PRN MC SEE 

COMMENTS;  Start 4/11/20 at 13:30;  Stop 4/11/20 at 13:34;  Status DC


Sodium Chloride 90 meq/Potassium Phosphate 19 mmol/ Magnesium Sulfate 12 meq/[place]

cium Gluconate 15 meq/ Multivitamins 10 ml/Chromium/ Copper/Manganese/ Seleni/Zn

0.5 ml/ Insulin Human Regular 40 unit/ Total Parenteral Nutrition/Amino 

Acids/Dextrose/ Fat Emulsion Intravenous 1,400 ml @  58.333 mls/ hr TPN  CONT IV

 Last administered on 4/12/20at 21:54;  Start 4/12/20 at 22:00;  Stop 4/13/20 at

21:59;  Status DC


Sodium Chloride 1,000 ml @  1,000 mls/hr Q1H PRN IV hypotension;  Start 4/13/20 

at 09:35;  Stop 4/13/20 at 15:34;  Status DC


Albumin Human 200 ml @  200 mls/hr 1X PRN  PRN IV Hypotension;  Start 4/13/20 at

09:45;  Stop 4/13/20 at 15:44;  Status DC


Diphenhydramine HCl (Benadryl) 25 mg 1X PRN  PRN IV ITCHING;  Start 4/13/20 at 

09:45;  Stop 4/14/20 at 09:44;  Status DC


Diphenhydramine HCl (Benadryl) 25 mg 1X PRN  PRN IV ITCHING;  Start 4/13/20 at 

09:45;  Stop 4/14/20 at 09:44;  Status DC


Sodium Chloride 1,000 ml @  400 mls/hr Q2H30M PRN IV PATENCY;  Start 4/13/20 at 

09:35;  Stop 4/13/20 at 21:34;  Status DC


Info (PHARMACY MONITORING -- do not chart) 1 each PRN DAILY  PRN MC SEE COMMENT

S;  Start 4/13/20 at 09:45;  Status Cancel


Sodium Chloride 100 meq/Potassium Phosphate 19 mmol/ Magnesium Sulfate 12 

meq/Calcium Gluconate 15 meq/ Multivitamins 10 ml/Chromium/ Copper/Manganese/ 

Seleni/Zn 0.5 ml/ Insulin Human Regular 40 unit/ Potassium Chloride 20 meq/ 

Total Parenteral Nutrition/Amino Acids/Dextrose/ Fat Emulsion Intravenous 1,400 

ml @  58.333 mls/ hr TPN  CONT IV  Last administered on 4/13/20at 22:02;  Start 

4/13/20 at 22:00;  Stop 4/14/20 at 21:59;  Status DC


Furosemide (Lasix) 40 mg 1X  ONCE IVP  Last administered on 4/13/20at 14:39;  

Start 4/13/20 at 14:30;  Stop 4/13/20 at 14:31;  Status DC


Metronidazole 100 ml @  100 mls/hr Q8HRS IV  Last administered on 4/21/20at 

06:04;  Start 4/14/20 at 10:00;  Stop 4/21/20 at 08:10;  Status DC


Sodium Chloride 1,000 ml @  1,000 mls/hr Q1H PRN IV hypotension;  Start 4/14/20 

at 08:00;  Stop 4/14/20 at 13:59;  Status DC


Albumin Human 200 ml @  200 mls/hr 1X PRN  PRN IV Hypotension;  Start 4/14/20 at

08:00;  Stop 4/14/20 at 13:59;  Status DC


Sodium Chloride 1,000 ml @  400 mls/hr Q2H30M PRN IV PATENCY;  Start 4/14/20 at 

08:00;  Stop 4/14/20 at 19:59;  Status DC


Info (PHARMACY MONITORING -- do not chart) 1 each PRN DAILY  PRN MC SEE 

COMMENTS;  Start 4/14/20 at 11:30;  Status UNV


Info (PHARMACY MONITORING -- do not chart) 1 each PRN DAILY  PRN MC SEE 

COMMENTS;  Start 4/14/20 at 11:30;  Stop 4/16/20 at 12:13;  Status DC


Sodium Chloride 100 meq/Potassium Phosphate 19 mmol/ Magnesium Sulfate 12 

meq/Calcium Gluconate 15 meq/ Multivitamins 10 ml/Chromium/ Copper/Manganese/ 

Seleni/Zn 0.5 ml/ Insulin Human Regular 40 unit/ Potassium Chloride 20 meq/ 

Total Parenteral Nutrition/Amino Acids/Dextrose/ Fat Emulsion Intravenous 1,400 

ml @  58.333 mls/ hr TPN  CONT IV  Last administered on 4/14/20at 21:52;  Start 

4/14/20 at 22:00;  Stop 4/15/20 at 21:59;  Status DC


Sodium Chloride (Normal Saline Flush) 10 ml QSHIFT  PRN IV AFTER MEDS AND BLOOD 

DRAWS;  Start 4/14/20 at 15:00;  Stop 5/12/20 at 11:27;  Status DC


Sodium Chloride (Normal Saline Flush) 10 ml PRN Q5MIN  PRN IV AFTER MEDS AND 

BLOOD DRAWS;  Start 4/14/20 at 15:00


Sodium Chloride (Normal Saline Flush) 20 ml PRN Q5MIN  PRN IV AFTER MEDS AND BL

OOD DRAWS;  Start 4/14/20 at 15:00


Sodium Chloride 100 meq/Potassium Phosphate 19 mmol/ Magnesium Sulfate 12 

meq/Calcium Gluconate 15 meq/ Multivitamins 10 ml/Chromium/ Copper/Manganese/ 

Seleni/Zn 0.5 ml/ Insulin Human Regular 40 unit/ Potassium Chloride 20 meq/ 

Total Parenteral Nutrition/Amino Acids/Dextrose/ Fat Emulsion Intravenous 1,400 

ml @  58.333 mls/ hr TPN  CONT IV  Last administered on 4/15/20at 21:20;  Start 

4/15/20 at 22:00;  Stop 4/16/20 at 21:59;  Status DC


Lidocaine HCl (Buffered Lidocaine 1%) 3 ml STK-MED ONCE .ROUTE ;  Start 4/15/20 

at 13:16;  Stop 4/15/20 at 13:16;  Status DC


Lidocaine HCl (Buffered Lidocaine 1%) 6 ml 1X  ONCE INJ  Last administered on 

4/15/20at 13:45;  Start 4/15/20 at 13:30;  Stop 4/15/20 at 13:31;  Status DC


Albumin Human 100 ml @  100 mls/hr 1X  ONCE IV  Last administered on 4/15/20at 

15:41;  Start 4/15/20 at 15:00;  Stop 4/15/20 at 15:59;  Status DC


Albumin Human 50 ml @ 50 mls/hr 1X  ONCE IV  Last administered on 4/15/20at 

15:00;  Start 4/15/20 at 15:00;  Stop 4/15/20 at 15:59;  Status DC


Info (PHARMACY MONITORING -- do not chart) 1 each PRN DAILY  PRN MC SEE 

COMMENTS;  Start 4/16/20 at 11:30;  Status Cancel


Info (PHARMACY MONITORING -- do not chart) 1 each PRN DAILY  PRN MC SEE 

COMMENTS;  Start 4/16/20 at 11:30;  Status UNV


Sodium Chloride 100 meq/Potassium Phosphate 10 mmol/ Magnesium Sulfate 12 

meq/Calcium Gluconate 15 meq/ Multivitamins 10 ml/Chromium/ Copper/Manganese/ 

Seleni/Zn 0.5 ml/ Insulin Human Regular 35 unit/ Potassium Chloride 20 meq/ 

Total Parenteral Nutrition/Amino Acids/Dextrose/ Fat Emulsion Intravenous 1,400 

ml @  58.333 mls/ hr TPN  CONT IV  Last administered on 4/16/20at 22:10;  Start 

4/16/20 at 22:00;  Stop 4/17/20 at 21:59;  Status DC


Sodium Chloride 100 meq/Potassium Phosphate 5 mmol/ Magnesium Sulfate 12 

meq/Calcium Gluconate 15 meq/ Multivitamins 10 ml/Chromium/ Copper/Manganese/ 

Seleni/Zn 0.5 ml/ Insulin Human Regular 35 unit/ Potassium Chloride 20 meq/ 

Total Parenteral Nutrition/Amino Acids/Dextrose/ Fat Emulsion Intravenous 1,400 

ml @  58.333 mls/ hr TPN  CONT IV  Last administered on 4/17/20at 22:59;  Start 

4/17/20 at 22:00;  Stop 4/18/20 at 21:59;  Status DC


Sodium Chloride 1,000 ml @  1,000 mls/hr Q1H PRN IV hypotension;  Start 4/18/20 

at 08:27;  Stop 4/18/20 at 14:26;  Status DC


Albumin Human 200 ml @  200 mls/hr 1X PRN  PRN IV Hypotension Last administered 

on 4/18/20at 09:18;  Start 4/18/20 at 08:30;  Stop 4/18/20 at 14:29;  Status DC


Sodium Chloride 1,000 ml @  400 mls/hr Q2H30M PRN IV PATENCY;  Start 4/18/20 at 

08:27;  Stop 4/18/20 at 20:26;  Status DC


Info (PHARMACY MONITORING -- do not chart) 1 each PRN DAILY  PRN MC SEE 

COMMENTS;  Start 4/18/20 at 08:30;  Status Cancel


Info (PHARMACY MONITORING -- do not chart) 1 each PRN DAILY  PRN MC SEE 

COMMENTS;  Start 4/18/20 at 08:30;  Stop 4/26/20 at 13:10;  Status DC


Sodium Chloride 100 meq/Potassium Chloride 40 meq/ Magnesium Sulfate 15 

meq/Calcium Gluconate 15 meq/ Multivitamins 10 ml/Chromium/ Copper/Manganese/ 

Seleni/Zn 0.5 ml/ Insulin Human Regular 35 unit/ Total Parenteral 

Nutrition/Amino Acids/Dextrose/ Fat Emulsion Intravenous 1,400 ml @  58.333 mls/

hr TPN  CONT IV  Last administered on 4/18/20at 22:00;  Start 4/18/20 at 22:00; 

Stop 4/19/20 at 21:59;  Status DC


Potassium Chloride/Water 100 ml @  100 mls/hr 1X  ONCE IV  Last administered on 

4/18/20at 17:28;  Start 4/18/20 at 14:45;  Stop 4/18/20 at 15:44;  Status DC


Sodium Chloride 100 meq/Potassium Chloride 40 meq/ Magnesium Sulfate 15 

meq/Calcium Gluconate 15 meq/ Multivitamins 10 ml/Chromium/ Copper/Manganese/ 

Seleni/Zn 0.5 ml/ Insulin Human Regular 35 unit/ Total Parenteral 

Nutrition/Amino Acids/Dextrose/ Fat Emulsion Intravenous 1,400 ml @  58.333 mls/

hr TPN  CONT IV  Last administered on 4/19/20at 22:46;  Start 4/19/20 at 22:00; 

Stop 4/20/20 at 21:59;  Status DC


Sodium Chloride 100 meq/Potassium Chloride 40 meq/ Magnesium Sulfate 20 

meq/Calcium Gluconate 15 meq/ Multivitamins 10 ml/Chromium/ Copper/Manganese/ 

Seleni/Zn 0.5 ml/ Insulin Human Regular 35 unit/ Total Parenteral 

Nutrition/Amino Acids/Dextrose/ Fat Emulsion Intravenous 1,400 ml @  58.333 mls/

hr TPN  CONT IV  Last administered on 4/20/20at 22:31;  Start 4/20/20 at 22:00; 

Stop 4/21/20 at 21:59;  Status DC


Fentanyl Citrate (Fentanyl 2ml Vial) 50 mcg PRN Q2HR  PRN IVP PAIN Last 

administered on 4/27/20at 13:32;  Start 4/20/20 at 21:00;  Stop 4/28/20 at 

12:53;  Status DC


Fentanyl Citrate (Fentanyl 2ml Vial) 25 mcg PRN Q2HR  PRN IVP PAIN;  Start 

4/20/20 at 21:00;  Stop 4/28/20 at 12:54;  Status DC


Enoxaparin Sodium (Lovenox 100mg Syringe) 100 mg Q12HR SQ ;  Start 4/21/20 at 

21:00;  Status UNV


Amino Acids/ Glycerin/ Electrolytes 1,000 ml @  75 mls/hr [id number] IV ;  Start 

4/20/20 at 21:15;  Status UNV


Sodium Chloride 1,000 ml @  1,000 mls/hr Q1H PRN IV hypotension;  Start 4/21/20 

at 07:56;  Stop 4/21/20 at 13:55;  Status DC


Albumin Human 200 ml @  200 mls/hr 1X PRN  PRN IV Hypotension Last administered 

on 4/21/20at 08:40;  Start 4/21/20 at 08:00;  Stop 4/21/20 at 13:59;  Status DC


Sodium Chloride 1,000 ml @  400 mls/hr Q2H30M PRN IV PATENCY;  Start 4/21/20 at 

07:56;  Stop 4/21/20 at 19:55;  Status DC


Info (PHARMACY MONITORING -- do not chart) 1 each PRN DAILY  PRN MC SEE 

COMMENTS;  Start 4/21/20 at 08:00;  Status UNV


Info (PHARMACY MONITORING -- do not chart) 1 each PRN DAILY  PRN MC SEE 

COMMENTS;  Start 4/21/20 at 08:00;  Status UNV


Daptomycin 430 mg/ Sodium Chloride 50 ml @  100 mls/hr Q24H IV  Last 

administered on 4/21/20at 12:35;  Start 4/21/20 at 09:00;  Stop 4/21/20 at 

12:49;  Status DC


Sodium Chloride 100 meq/Potassium Chloride 40 meq/ Magnesium Sulfate 20 

meq/Calcium Gluconate 15 meq/ Multivitamins 10 ml/Chromium/ Copper/Manganese/ 

Seleni/Zn 0.5 ml/ Insulin Human Regular 35 unit/ Total Parenteral Nutritio

n/Amino Acids/Dextrose/ Fat Emulsion Intravenous 1,400 ml @  58.333 mls/ hr TPN 

CONT IV  Last administered on 4/21/20at 21:26;  Start 4/21/20 at 22:00;  Stop 

4/22/20 at 21:59;  Status DC


Daptomycin 430 mg/ Sodium Chloride 50 ml @  100 mls/hr Q48H IV ;  Start 4/23/20 

at 09:00;  Stop 4/22/20 at 11:55;  Status DC


Sodium Chloride 100 meq/Potassium Chloride 40 meq/ Magnesium Sulfate 20 

meq/Calcium Gluconate 15 meq/ Multivitamins 10 ml/Chromium/ Copper/Manganese/ 

Seleni/Zn 0.5 ml/ Insulin Human Regular 35 unit/ Total Parenteral 

Nutrition/Amino Acids/Dextrose/ Fat Emulsion Intravenous 1,400 ml @  58.333 mls/

hr TPN  CONT IV  Last administered on 4/22/20at 22:27;  Start 4/22/20 at 22:00; 

Stop 4/23/20 at 21:59;  Status DC


Daptomycin 430 mg/ Sodium Chloride 50 ml @  100 mls/hr Q24H IV  Last 

administered on 4/24/20at 15:07;  Start 4/22/20 at 13:00;  Stop 4/25/20 at 

13:15;  Status DC


Sodium Chloride 100 meq/Potassium Chloride 40 meq/ Magnesium Sulfate 20 

meq/Calcium Gluconate 10 meq/ Multivitamins 10 ml/Chromium/ Copper/Manganese/ 

Seleni/Zn 0.5 ml/ Insulin Human Regular 35 unit/ Total Parenteral 

Nutrition/Amino Acids/Dextrose/ Fat Emulsion Intravenous 1,400 ml @  58.333 mls/

hr TPN  CONT IV  Last administered on 4/24/20at 00:06;  Start 4/23/20 at 22:00; 

Stop 4/24/20 at 21:59;  Status DC


Alteplase, Recombinant (Cathflo For Central Catheter Clearance) 1 mg 1X  ONCE 

INT CAT  Last administered on 4/24/20at 11:44;  Start 4/24/20 at 10:45;  Stop 

4/24/20 at 10:46;  Status DC


Ondansetron HCl (Zofran) 4 mg PRN Q6HRS  PRN IV NAUSEA/VOMITING;  Start 4/27/20 

at 07:00;  Stop 4/28/20 at 06:59;  Status DC


Fentanyl Citrate (Fentanyl 2ml Vial) 25 mcg PRN Q5MIN  PRN IV MILD PAIN 1-3;  

Start 4/27/20 at 07:00;  Stop 4/28/20 at 06:59;  Status DC


Fentanyl Citrate (Fentanyl 2ml Vial) 50 mcg PRN Q5MIN  PRN IV MODERATE TO SEVERE

PAIN Last administered on 4/27/20at 10:17;  Start 4/27/20 at 07:00;  Stop 

4/28/20 at 06:59;  Status DC


Ringer's Solution 1,000 ml @  30 mls/hr Q24H IV ;  Start 4/27/20 at 07:00;  Stop

4/27/20 at 18:59;  Status DC


Lidocaine HCl (Xylocaine-Mpf 1% 2ml Vial) 2 ml PRN 1X  PRN ID PRIOR TO IV START;

 Start 4/27/20 at 07:00;  Stop 4/28/20 at 06:59;  Status DC


Prochlorperazine Edisylate (Compazine) 5 mg PACU PRN  PRN IV NAUSEA, MRX1;  

Start 4/27/20 at 07:00;  Stop 4/28/20 at 06:59;  Status DC


Sodium Acetate 50 meq/Potassium Acetate 55 meq/ Magnesium Sulfate 20 meq/Calcium

Gluconate 10 meq/ Multivitamins 10 ml/Chromium/ Copper/Manganese/ Seleni/Zn 0.5 

ml/ Insulin Human Regular 35 unit/ Total Parenteral Nutrition/Amino 

Acids/Dextrose/ Fat Emulsion Intravenous 1,400 ml @  58.333 mls/ hr TPN  CONT IV

;  Start 4/24/20 at 22:00;  Stop 4/24/20 at 14:15;  Status DC


Sodium Acetate 50 meq/Potassium Acetate 55 meq/ Magnesium Sulfate 20 meq/Calcium

Gluconate 10 meq/ Multivitamins 10 ml/Chromium/ Copper/Manganese/ Seleni/Zn 0.5 

ml/ Insulin Human Regular 35 unit/ Total Parenteral Nutrition/Amino 

Acids/Dextrose/ Fat Emulsion Intravenous 1,800 ml @  75 mls/hr TPN  CONT IV  

Last administered on 4/24/20at 22:38;  Start 4/24/20 at 22:00;  Stop 4/25/20 at 

21:59;  Status DC


Sodium Chloride 1,000 ml @  1,000 mls/hr Q1H PRN IV hypotension;  Start 4/24/20 

at 15:31;  Stop 4/24/20 at 21:30;  Status DC


Diphenhydramine HCl (Benadryl) 25 mg 1X PRN  PRN IV ITCHING;  Start 4/24/20 at 

15:45;  Stop 4/25/20 at 15:44;  Status DC


Diphenhydramine HCl (Benadryl) 25 mg 1X PRN  PRN IV ITCHING;  Start 4/24/20 at 

15:45;  Stop 4/25/20 at 15:44;  Status DC


Sodium Chloride 1,000 ml @  400 mls/hr Q2H30M PRN IV PATENCY;  Start 4/24/20 at 

15:31;  Stop 4/25/20 at 03:30;  Status DC


Info (PHARMACY MONITORING -- do not chart) 1 each PRN DAILY  PRN MC SEE 

COMMENTS;  Start 4/24/20 at 15:45;  Stop 5/26/20 at 14:14;  Status DC


Sodium Acetate 50 meq/Potassium Acetate 55 meq/ Magnesium Sulfate 20 meq/Calcium

Gluconate 10 meq/ Multivitamins 10 ml/Chromium/ Copper/Manganese/ Seleni/Zn 0.5 

ml/ Insulin Human Regular 35 unit/ Total Parenteral Nutrition/Amino 

Acids/Dextrose/ Fat Emulsion Intravenous 1,800 ml @  75 mls/hr TPN  CONT IV  

Last administered on 4/25/20at 22:03;  Start 4/25/20 at 22:00;  Stop 4/26/20 at 

21:59;  Status DC


Daptomycin 430 mg/ Sodium Chloride 50 ml @  100 mls/hr Q24H IV  Last 

administered on 4/30/20at 13:00;  Start 4/25/20 at 13:00;  Stop 4/30/20 at 

20:58;  Status DC


Heparin Sodium (Porcine) 1000 unit/Sodium Chloride 1,001 ml @  1,001 mls/hr 1X  

ONCE IRR ;  Start 4/27/20 at 06:00;  Stop 4/27/20 at 06:59;  Status DC


Potassium Acetate 55 meq/Magnesium Sulfate 20 meq/ Calcium Gluconate 10 meq/ 

Multivitamins 10 ml/Chromium/ Copper/Manganese/ Seleni/Zn 0.5 ml/ Insulin Human 

Regular 35 unit/ Total Parenteral Nutrition/Amino Acids/Dextrose/ Fat Emulsion 

Intravenous 1,920 ml @  80 mls/hr TPN  CONT IV  Last administered on 4/26/20at 

22:10;  Start 4/26/20 at 22:00;  Stop 4/27/20 at 21:59;  Status DC


Dexamethasone Sodium Phosphate (Decadron) 4 mg STK-MED ONCE .ROUTE ;  Start 

4/27/20 at 10:56;  Stop 4/27/20 at 10:57;  Status DC


Ondansetron HCl (Zofran) 4 mg STK-MED ONCE .ROUTE ;  Start 4/27/20 at 10:56;  

Stop 4/27/20 at 10:57;  Status DC


Rocuronium Bromide (Zemuron) 50 mg STK-MED ONCE .ROUTE ;  Start 4/27/20 at 

10:56;  Stop 4/27/20 at 10:57;  Status DC


Fentanyl Citrate (Fentanyl 2ml Vial) 100 mcg STK-MED ONCE .ROUTE ;  Start 

4/27/20 at 10:56;  Stop 4/27/20 at 10:57;  Status DC


Bupivacaine HCl/ Epinephrine Bitart (Sensorcain-Epi 0.5%-1:[id number] Mpf) 30 ml 

STK-MED ONCE .ROUTE  Last administered on 4/27/20at 12:01;  Start 4/27/20 at 

10:58;  Stop 4/27/20 at 10:58;  Status DC


Cellulose (Surgicel Hemostat 2x14) 1 each STK-MED ONCE .ROUTE ;  Start 4/27/20 

at 10:58;  Stop 4/27/20 at 10:59;  Status DC


Iohexol (Omnipaque 300 Mg/ml) 50 ml STK-MED ONCE .ROUTE ;  Start 4/27/20 at 

10:58;  Stop 4/27/20 at 10:59;  Status DC


Cellulose (Surgicel Hemostat 4x8) 1 each STK-MED ONCE .ROUTE ;  Start 4/27/20 at

10:58;  Stop 4/27/20 at 10:59;  Status DC


Bisacodyl (Dulcolax Supp) 10 mg STK-MED ONCE .ROUTE ;  Start 4/27/20 at 10:59;  

Stop 4/27/20 at 10:59;  Status DC


Heparin Sodium (Porcine) 1000 unit/Sodium Chloride 1,001 ml @  1,001 mls/hr 1X  

ONCE IRR ;  Start 4/27/20 at 12:00;  Stop 4/27/20 at 12:59;  Status DC


Propofol 20 ml @ As Directed STK-MED ONCE IV ;  Start 4/27/20 at 11:05;  Stop 

4/27/20 at 11:05;  Status DC


Sevoflurane (Ultane) 90 ml STK-MED ONCE IH ;  Start 4/27/20 at 11:05;  Stop 

4/27/20 at 11:05;  Status DC


Sevoflurane (Ultane) 60 ml STK-MED ONCE IH ;  Start 4/27/20 at 12:26;  Stop 

4/27/20 at 12:27;  Status DC


Propofol 20 ml @ As Directed STK-MED ONCE IV ;  Start 4/27/20 at 12:26;  Stop 

4/27/20 at 12:27;  Status DC


Phenylephrine HCl (PHENYLEPHRINE in 0.9% NACL PF) 1 mg STK-MED ONCE IV ;  Start 

4/27/20 at 12:34;  Stop 4/27/20 at 12:34;  Status DC


Heparin Sodium (Porcine) (Heparin Sodium) 5,000 unit Q12HR SQ  Last administered

on 5/6/20at 20:57;  Start 4/27/20 at 21:00;  Stop 5/7/20 at 09:59;  Status DC


Sodium Chloride (Normal Saline Flush) 3 ml QSHIFT  PRN IV AFTER MEDS AND BLOOD 

DRAWS;  Start 4/27/20 at 13:45


Naloxone HCl (Narcan) 0.4 mg PRN Q2MIN  PRN IV SEE INSTRUCTIONS;  Start 4/27/20 

at 13:45


Sodium Chloride 1,000 ml @  25 mls/hr Q24H IV  Last administered on 5/26/20at 

13:37;  Start 4/27/20 at 13:37;  Stop 5/29/20 at 13:09;  Status DC


Naloxone HCl (Narcan) 0.4 mg PRN Q2MIN  PRN IV SEE INSTRUCTIONS;  Start 4/27/20 

at 14:30;  Status UNV


Sodium Chloride 1,000 ml @  25 mls/hr Q24H IV ;  Start 4/27/20 at 14:30;  Status

UNV


Hydromorphone HCl 30 ml @ 0 mls/hr CONT PRN  PRN IV PER PROTOCOL Last 

administered on 5/2/20at 16:08;  Start 4/27/20 at 14:30;  Stop 5/4/20 at 08:55; 

Status DC


Potassium Acetate 55 meq/Magnesium Sulfate 20 meq/ Calcium Gluconate 10 meq/ 

Multivitamins 10 ml/Chromium/ Copper/Manganese/ Seleni/Zn 0.5 ml/ Insulin Human 

Regular 35 unit/ Total Parenteral Nutrition/Amino Acids/Dextrose/ Fat Emulsion 

Intravenous 1,920 ml @  80 mls/hr TPN  CONT IV  Last administered on 4/27/20at 

22:01;  Start 4/27/20 at 22:00;  Stop 4/28/20 at 21:59;  Status DC


Bumetanide (Bumex) 2 mg [id number] IV  Last administered on 5/1/20at 13:50;  Start 

4/28/20 at 14:00;  Stop 5/2/20 at 14:10;  Status DC


Meropenem 1 gm/ Sodium Chloride 100 ml @  200 mls/hr Q8HRS IV  Last administered

on 5/22/20at 05:53;  Start 4/28/20 at 14:00;  Stop 5/22/20 at 09:31;  Status DC


Potassium Acetate 55 meq/Magnesium Sulfate 20 meq/ Calcium Gluconate 10 meq/ 

Multivitamins 10 ml/Chromium/ Copper/Manganese/ Seleni/Zn 0.5 ml/ Insulin Human 

Regular 35 unit/ Total Parenteral Nutrition/Amino Acids/Dextrose/ Fat Emulsion 

Intravenous 1,920 ml @  80 mls/hr TPN  CONT IV  Last administered on 4/28/20at 

22:02;  Start 4/28/20 at 22:00;  Stop 4/29/20 at 21:59;  Status DC


Hydromorphone HCl (Dilaudid Standard PCA) 12 mg STK-MED ONCE IV ;  Start 4/27/20

at 14:35;  Stop 4/28/20 at 13:53;  Status DC


Artificial Tears (Artificial Tears) 1 drop PRN Q15MIN  PRN OU DRY EYE Last 

administered on 5/29/20at 10:08;  Start 4/29/20 at 05:30


Hydromorphone HCl (Dilaudid Standard PCA) 12 mg STK-MED ONCE IV ;  Start 4/28/20

at 12:05;  Stop 4/29/20 at 09:15;  Status DC


Potassium Acetate 65 meq/Magnesium Sulfate 20 meq/ Calcium Gluconate 10 meq/ 

Multivitamins 10 ml/Chromium/ Copper/Manganese/ Seleni/Zn 0.5 ml/ Insulin Human 

Regular 30 unit/ Total Parenteral Nutrition/Amino Acids/Dextrose/ Fat Emulsion 

Intravenous 1,920 ml @  80 mls/hr TPN  CONT IV  Last administered on 4/29/20at 

22:22;  Start 4/29/20 at 22:00;  Stop 4/30/20 at 21:59;  Status DC


Cyclobenzaprine HCl (Flexeril) 10 mg PRN Q6HRS  PRN PO MUSCLE SPASMS;  Start 

4/30/20 at 10:45


Potassium Acetate 55 meq/Magnesium Sulfate 20 meq/ Calcium Gluconate 10 meq/ 

Multivitamins 10 ml/Chromium/ Copper/Manganese/ Seleni/Zn 0.5 ml/ Insulin Human 

Regular 30 unit/ Total Parenteral Nutrition/Amino Acids/Dextrose/ Fat Emulsion 

Intravenous 1,920 ml @  80 mls/hr TPN  CONT IV  Last administered on 5/1/20at 

01:00;  Start 4/30/20 at 22:00;  Stop 5/1/20 at 21:59;  Status DC


Magnesium Sulfate 50 ml @ 25 mls/hr 1X  ONCE IV  Last administered on 4/30/20at 

17:18;  Start 4/30/20 at 12:45;  Stop 4/30/20 at 14:44;  Status DC


Potassium Chloride/Water 100 ml @  100 mls/hr 1X  ONCE IV  Last administered on 

5/1/20at 11:27;  Start 5/1/20 at 12:00;  Stop 5/1/20 at 12:59;  Status DC


Hydromorphone HCl (Dilaudid Standard PCA) 12 mg STK-MED ONCE IV ;  Start 4/29/20

at 10:50;  Stop 5/1/20 at 11:02;  Status DC


Hydromorphone HCl (Dilaudid Standard PCA) 12 mg STK-MED ONCE IV ;  Start 4/30/20

at 13:47;  Stop 5/1/20 at 11:03;  Status DC


Potassium Acetate 30 meq/Magnesium Sulfate 20 meq/ Calcium Gluconate 10 meq/ 

Multivitamins 10 ml/Chromium/ Copper/Manganese/ Seleni/Zn 0.5 ml/ Insulin Human 

Regular 30 unit/ Potassium Chloride 30 meq/ Total Parenteral Nutrition/Amino Aci

ds/Dextrose/ Fat Emulsion Intravenous 1,920 ml @  80 mls/hr TPN  CONT IV  Last 

administered on 5/1/20at 22:34;  Start 5/1/20 at 22:00;  Stop 5/2/20 at 21:59;  

Status DC


Potassium Chloride/Water 100 ml @  100 mls/hr Q1H IV  Last administered on 

5/2/20at 13:05;  Start 5/2/20 at 07:00;  Stop 5/2/20 at 10:59;  Status DC


Magnesium Sulfate 50 ml @ 25 mls/hr 1X  ONCE IV  Last administered on 5/2/20at 

10:34;  Start 5/2/20 at 10:30;  Stop 5/2/20 at 12:29;  Status DC


Potassium Chloride 75 meq/ Magnesium Sulfate 20 meq/Calcium Gluconate 10 meq/ 

Multivitamins 10 ml/Chromium/ Copper/Manganese/ Seleni/Zn 0.5 ml/ Insulin Human 

Regular 30 unit/ Total Parenteral Nutrition/Amino Acids/Dextrose/ Fat Emulsion 

Intravenous 1,920 ml @  80 mls/hr TPN  CONT IV  Last administered on 5/2/20at 

21:51;  Start 5/2/20 at 22:00;  Stop 5/3/20 at 22:00;  Status DC


Potassium Chloride 75 meq/ Magnesium Sulfate 20 meq/Calcium Gluconate 10 meq/ 

Multivitamins 10 ml/Chromium/ Copper/Manganese/ Seleni/Zn 0.5 ml/ Insulin Human 

Regular 25 unit/ Total Parenteral Nutrition/Amino Acids/Dextrose/ Fat Emulsion 

Intravenous 1,920 ml @  80 mls/hr TPN  CONT IV  Last administered on 5/3/20at 

22:04;  Start 5/3/20 at 22:00;  Stop 5/4/20 at 21:59;  Status DC


Hydromorphone HCl (Dilaudid) 0.4 mg PRN Q4HRS  PRN IVP PAIN Last administered on

5/4/20at 10:57;  Start 5/4/20 at 09:00;  Stop 5/4/20 at 18:59;  Status DC


Micafungin Sodium 100 mg/Dextrose 100 ml @  100 mls/hr Q24H IV  Last 

administered on 5/26/20at 12:17;  Start 5/4/20 at 11:00;  Stop 5/27/20 at 09:59;

 Status DC


Daptomycin 485 mg/ Sodium Chloride 50 ml @  100 mls/hr Q24H IV  Last 

administered on 5/11/20at 13:10;  Start 5/4/20 at 11:00;  Stop 5/12/20 at 07:44;

 Status DC


Potassium Chloride 75 meq/ Magnesium Sulfate 15 meq/Calcium Gluconate 8 meq/ 

Multivitamins 10 ml/Chromium/ Copper/Manganese/ Seleni/Zn 0.5 ml/ Insulin Human 

Regular 25 unit/ Total Parenteral Nutrition/Amino Acids/Dextrose/ Fat Emulsion 

Intravenous 1,920 ml @  80 mls/hr TPN  CONT IV  Last administered on 5/4/20at 

23:08;  Start 5/4/20 at 22:00;  Stop 5/5/20 at 21:59;  Status DC


Haloperidol Lactate (Haldol Inj) 3 mg 1X  ONCE IVP  Last administered on 

5/4/20at 14:37;  Start 5/4/20 at 14:30;  Stop 5/4/20 at 14:31;  Status DC


Hydromorphone HCl (Dilaudid) 1 mg PRN Q4HRS  PRN IVP PAIN Last administered on 

5/18/20at 06:25;  Start 5/4/20 at 19:00;  Stop 5/18/20 at 17:10;  Status DC


Potassium Chloride 75 meq/ Magnesium Sulfate 15 meq/Calcium Gluconate 8 meq/ 

Multivitamins 10 ml/Chromium/ Copper/Manganese/ Seleni/Zn 0.5 ml/ Insulin Human 

Regular 20 unit/ Total Parenteral Nutrition/Amino Acids/Dextrose/ Fat Emulsion 

Intravenous 1,920 ml @  80 mls/hr TPN  CONT IV  Last administered on 5/5/20at 

22:10;  Start 5/5/20 at 22:00;  Stop 5/6/20 at 21:59;  Status DC


Lidocaine HCl (Buffered Lidocaine 1%) 3 ml STK-MED ONCE .ROUTE ;  Start 5/6/20 

at 11:31;  Stop 5/6/20 at 11:31;  Status DC


Lidocaine HCl (Buffered Lidocaine 1%) 3 ml STK-MED ONCE .ROUTE ;  Start 5/6/20 

at 12:28;  Stop 5/6/20 at 12:29;  Status DC


Lidocaine HCl (Buffered Lidocaine 1%) 6 ml 1X  ONCE INJ  Last administered on 

5/6/20at 12:53;  Start 5/6/20 at 12:45;  Stop 5/6/20 at 12:46;  Status DC


Potassium Chloride 75 meq/ Magnesium Sulfate 15 meq/Calcium Gluconate 8 meq/ 

Multivitamins 10 ml/Chromium/ Copper/Manganese/ Seleni/Zn 0.5 ml/ Insulin Human 

Regular 20 unit/ Total Parenteral Nutrition/Amino Acids/Dextrose/ Fat Emulsion 

Intravenous 1,920 ml @  80 mls/hr TPN  CONT IV  Last administered on 5/6/20at 

22:00;  Start 5/6/20 at 22:00;  Stop 5/7/20 at 21:59;  Status DC


Potassium Chloride 75 meq/ Magnesium Sulfate 15 meq/Calcium Gluconate 8 meq/ 

Multivitamins 10 ml/Chromium/ Copper/Manganese/ Seleni/Zn 0.5 ml/ Insulin Human 

Regular 15 unit/ Total Parenteral Nutrition/Amino Acids/Dextrose/ Fat Emulsion 

Intravenous 1,920 ml @  80 mls/hr TPN  CONT IV  Last administered on 5/7/20at 

22:28;  Start 5/7/20 at 22:00;  Stop 5/8/20 at 21:59;  Status DC


Vecuronium Bromide (Norcuron Bolus) 6 mg PRN Q6HRS  PRN IV VENT ASYNCHRONY;  

Start 5/7/20 at 19:15;  Stop 5/7/20 at 19:35;  Status DC


Bumetanide (Bumex) 2 mg 1X  ONCE IV  Last administered on 5/7/20at 22:09;  Start

5/7/20 at 19:45;  Stop 5/7/20 at 19:46;  Status DC


Lidocaine HCl (Buffered Lidocaine 1%) 3 ml STK-MED ONCE .ROUTE ;  Start 5/8/20 

at 07:59;  Stop 5/8/20 at 07:59;  Status DC


Midazolam HCl (Versed) 5 mg STK-MED ONCE .ROUTE ;  Start 5/8/20 at 08:36;  Stop 

5/8/20 at 08:36;  Status DC


Fentanyl Citrate (Fentanyl 5ml Vial) 250 mcg STK-MED ONCE .ROUTE ;  Start 5/8/20

at 08:36;  Stop 5/8/20 at 08:37;  Status DC


Lidocaine HCl (Buffered Lidocaine 1%) 3 ml 1X  ONCE IJ  Last administered on 

5/8/20at 09:30;  Start 5/8/20 at 09:15;  Stop 5/8/20 at 09:16;  Status DC


Midazolam HCl (Versed) 5 mg 1X  ONCE IV  Last administered on 5/8/20at 09:30;  

Start 5/8/20 at 09:15;  Stop 5/8/20 at 09:16;  Status DC


Fentanyl Citrate (Fentanyl 5ml Vial) 250 mcg 1X  ONCE IV  Last administered on 

5/8/20at 09:30;  Start 5/8/20 at 09:15;  Stop 5/8/20 at 09:16;  Status DC


Bumetanide (Bumex) 2 mg DAILY IV  Last administered on 5/18/20at 08:07;  Start 

5/8/20 at 10:00;  Stop 5/18/20 at 17:15;  Status DC


Potassium Chloride 75 meq/ Magnesium Sulfate 15 meq/ Multivitamins 10 

ml/Chromium/ Copper/Manganese/ Seleni/Zn 0.5 ml/ Insulin Human Regular 15 unit/ 

Total Parenteral Nutrition/Amino Acids/Dextrose/ Fat Emulsion Intravenous 1,920 

ml @  80 mls/hr TPN  CONT IV  Last administered on 5/8/20at 21:59;  Start 5/8/20

at 22:00;  Stop 5/9/20 at 21:59;  Status DC


Metoclopramide HCl (Reglan Vial) 10 mg PRN Q3HRS  PRN IVP NAUSEA/VOMITING-3rd 

choice Last administered on 5/14/20at 04:25;  Start 5/9/20 at 16:45


Potassium Chloride 75 meq/ Magnesium Sulfate 15 meq/ Multivitamins 10 

ml/Chromium/ Copper/Manganese/ Seleni/Zn 0.5 ml/ Insulin Human Regular 15 unit/ 

Total Parenteral Nutrition/Amino Acids/Dextrose/ Fat Emulsion Intravenous 1,920 

ml @  80 mls/hr TPN  CONT IV  Last administered on 5/9/20at 22:41;  Start 5/9/20

at 22:00;  Stop 5/10/20 at 21:59;  Status DC


Magnesium Sulfate 50 ml @ 25 mls/hr 1X  ONCE IV  Last administered on 5/10/20at 

10:44;  Start 5/10/20 at 09:00;  Stop 5/10/20 at 10:59;  Status DC


Potassium Chloride/Water 100 ml @  100 mls/hr 1X  ONCE IV  Last administered on 

5/10/20at 09:37;  Start 5/10/20 at 09:00;  Stop 5/10/20 at 09:59;  Status DC


Duloxetine HCl (Cymbalta) 30 mg DAILY PO  Last administered on 5/11/20at 09:48; 

Start 5/10/20 at 14:00;  Stop 5/13/20 at 10:25;  Status DC


Potassium Chloride 80 meq/ Magnesium Sulfate 20 meq/ Multivitamins 10 

ml/Chromium/ Copper/Manganese/ Seleni/Zn 0.5 ml/ Insulin Human Regular 15 unit/ 

Total Parenteral Nutrition/Amino Acids/Dextrose/ Fat Emulsion Intravenous 1,920 

ml @  80 mls/hr TPN  CONT IV  Last administered on 5/10/20at 21:42;  Start 

5/10/20 at 22:00;  Stop 5/11/20 at 21:59;  Status DC


Potassium Chloride 80 meq/ Magnesium Sulfate 20 meq/ Multivitamins 10 

ml/Chromium/ Copper/Manganese/ Seleni/Zn 0.5 ml/ Insulin Human Regular 15 unit/ 

Total Parenteral Nutrition/Amino Acids/Dextrose/ Fat Emulsion Intravenous 1,920 

ml @  80 mls/hr TPN  CONT IV  Last administered on 5/11/20at 22:20;  Start 

5/11/20 at 22:00;  Stop 5/12/20 at 21:59;  Status DC


Lidocaine HCl (Buffered Lidocaine 1%) 3 ml STK-MED ONCE .ROUTE ;  Start 5/12/20 

at 09:54;  Stop 5/12/20 at 09:55;  Status DC


Hydromorphone HCl (Dilaudid Standard PCA) 12 mg STK-MED ONCE IV ;  Start 5/1/20 

at 15:50;  Stop 5/12/20 at 11:24;  Status DC


Potassium Chloride 80 meq/ Magnesium Sulfate 20 meq/ Multivitamins 10 

ml/Chromium/ Copper/Manganese/ Seleni/Zn 0.5 ml/ Insulin Human Regular 15 unit/ 

Total Parenteral Nutrition/Amino Acids/Dextrose/ Fat Emulsion Intravenous 1,920 

ml @  80 mls/hr TPN  CONT IV  Last administered on 5/12/20at 21:40;  Start 

5/12/20 at 22:00;  Stop 5/13/20 at 21:59;  Status DC


Lidocaine HCl (Buffered Lidocaine 1%) 6 ml 1X  ONCE INJ  Last administered on 

5/12/20at 14:15;  Start 5/12/20 at 14:15;  Stop 5/12/20 at 14:16;  Status DC


Potassium Chloride 80 meq/ Magnesium Sulfate 20 meq/ Multivitamins 10 

ml/Chromium/ Copper/Manganese/ Seleni/Zn 1 ml/ Insulin Human Regular 15 unit/ 

Total Parenteral Nutrition/Amino Acids/Dextrose/ Fat Emulsion Intravenous 1,920 

ml @  80 mls/hr TPN  CONT IV  Last administered on 5/13/20at 22:04;  Start 5 /13/20 at 22:00;  Stop 5/14/20 at 21:59;  Status DC


Potassium Chloride/Water 100 ml @  100 mls/hr 1X  ONCE IV  Last administered on 

5/14/20at 11:34;  Start 5/14/20 at 11:00;  Stop 5/14/20 at 11:59;  Status DC


Potassium Chloride 90 meq/ Magnesium Sulfate 20 meq/ Multivitamins 10 

ml/Chromium/ Copper/Manganese/ Seleni/Zn 1 ml/ Insulin Human Regular 15 unit/ 

Total Parenteral Nutrition/Amino Acids/Dextrose/ Fat Emulsion Intravenous 1,920 

ml @  80 mls/hr TPN  CONT IV  Last administered on 5/14/20at 22:57;  Start 

5/14/20 at 22:00;  Stop 5/15/20 at 21:59;  Status DC


Potassium Chloride 90 meq/ Magnesium Sulfate 20 meq/ Multivitamins 10 ml/Ch

romium/ Copper/Manganese/ Seleni/Zn 1 ml/ Insulin Human Regular 15 unit/ Total 

Parenteral Nutrition/Amino Acids/Dextrose/ Fat Emulsion Intravenous 1,920 ml @  

80 mls/hr TPN  CONT IV  Last administered on 5/15/20at 22:48;  Start 5/15/20 at 

22:00;  Stop 5/16/20 at 21:59;  Status DC


Potassium Chloride 90 meq/ Magnesium Sulfate 20 meq/ Multivitamins 10 

ml/Chromium/ Copper/Manganese/ Seleni/Zn 1 ml/ Insulin Human Regular 15 unit/ 

Total Parenteral Nutrition/Amino Acids/Dextrose/ Fat Emulsion Intravenous 1,890 

ml @  78.75 mls/ hr TPN  CONT IV  Last administered on 5/16/20at 22:15;  Start 

5/16/20 at 22:00;  Stop 5/17/20 at 21:59;  Status DC


Linezolid/Dextrose 300 ml @  300 mls/hr Q12HR IV  Last administered on 5/19/20at

21:08;  Start 5/17/20 at 09:00;  Stop 5/20/20 at 08:11;  Status DC


Daptomycin 450 mg/ Sodium Chloride 50 ml @  100 mls/hr Q24H IV  Last 

administered on 5/20/20at 09:25;  Start 5/17/20 at 09:00;  Stop 5/21/20 at 

08:30;  Status DC


Potassium Chloride 90 meq/ Magnesium Sulfate 20 meq/ Multivitamins 10 ml/

Chromium/ Copper/Manganese/ Seleni/Zn 1 ml/ Insulin Human Regular 15 unit/ Total

Parenteral Nutrition/Amino Acids/Dextrose/ Fat Emulsion Intravenous 1,890 ml @  

78.75 mls/ hr TPN  CONT IV  Last administered on 5/17/20at 21:34;  Start 5/17/20

at 22:00;  Stop 5/18/20 at 21:59;  Status DC


Lorazepam (Ativan Inj) 2 mg STK-MED ONCE .ROUTE ;  Start 5/17/20 at 14:58;  Stop

5/17/20 at 14:58;  Status DC


Metoprolol Tartrate (Lopressor Vial) 5 mg 1X  ONCE IVP  Last administered on 

5/17/20at 15:31;  Start 5/17/20 at 15:15;  Stop 5/17/20 at 15:16;  Status DC


Lorazepam (Ativan Inj) 2 mg 1X  ONCE IVP  Last administered on 5/17/20at 15:30; 

Start 5/17/20 at 15:15;  Stop 5/17/20 at 15:16;  Status DC


Enoxaparin Sodium (Lovenox 40mg Syringe) 40 mg Q24H SQ  Last administered on 

6/5/20at 17:44;  Start 5/17/20 at 17:00


Lorazepam (Ativan Inj) 1 mg PRN Q4HRS  PRN IVP ANXIETY / AGITATION MILD-MOD Last

administered on 5/31/20at 15:55;  Start 5/17/20 at 19:15;  Stop 6/2/20 at 11:45;

 Status DC


Lorazepam (Ativan Inj) 2 mg PRN Q4HRS  PRN IVP ANXIETY / AGITATION SEVERE Last 

administered on 6/1/20at 07:55;  Start 5/17/20 at 19:15;  Stop 6/2/20 at 11:45; 

Status DC


Fentanyl Citrate (Fentanyl 2ml Vial) 50 mcg PRN Q4HRS  PRN IVP SEVERE PAIN Last 

administered on 6/4/20at 18:04;  Start 5/18/20 at 13:15


Fentanyl Citrate (Fentanyl 2ml Vial) 25 mcg PRN Q4HRS  PRN IVP MODERATE PAIN 

Last administered on 6/6/20at 06:27;  Start 5/18/20 at 13:15


Potassium Chloride 90 meq/ Magnesium Sulfate 20 meq/ Multivitamins 10 

ml/Chromium/ Copper/Manganese/ Seleni/Zn 1 ml/ Insulin Human Regular 15 unit/ 

Total Parenteral Nutrition/Amino Acids/Dextrose/ Fat Emulsion Intravenous 1,890 

ml @  78.75 mls/ hr TPN  CONT IV  Last administered on 5/18/20at 22:18;  Start 

5/18/20 at 22:00;  Stop 5/19/20 at 21:59;  Status DC


Furosemide (Lasix) 40 mg 1X  ONCE IVP  Last administered on 5/18/20at 21:51;  

Start 5/18/20 at 21:45;  Stop 5/18/20 at 21:48;  Status DC


Albumin Human 100 ml @  100 mls/hr 1X PRN  PRN IV SEE COMMENTS;  Start 5/19/20 

at 01:30


Furosemide (Lasix) 40 mg [id number] IVP  Last administered on 6/3/20at 08:04;  Start 

5/19/20 at 14:00;  Stop 6/3/20 at 13:07;  Status DC


Potassium Chloride 90 meq/ Magnesium Sulfate 20 meq/ Multivitamins 10 

ml/Chromium/ Copper/Manganese/ Seleni/Zn 1 ml/ Insulin Human Regular 15 unit/ 

Total Parenteral Nutrition/Amino Acids/Dextrose/ Fat Emulsion Intravenous 1,800 

ml @  75 mls/hr TPN  CONT IV  Last administered on 5/19/20at 22:31;  Start 

5/19/20 at 22:00;  Stop 5/20/20 at 21:59;  Status DC


Potassium Chloride 90 meq/ Magnesium Sulfate 20 meq/ Multivitamins 10 

ml/Chromium/ Copper/Manganese/ Seleni/Zn 1 ml/ Insulin Human Regular 15 unit/ 

Total Parenteral Nutrition/Amino Acids/Dextrose/ Fat Emulsion Intravenous 1,800 

ml @  75 mls/hr TPN  CONT IV  Last administered on 5/20/20at 22:28;  Start 

5/20/20 at 22:00;  Stop 5/21/20 at 21:59;  Status DC


Potassium Chloride 110 meq/ Magnesium Sulfate 20 meq/ Multivitamins 10 

ml/Chromium/ Copper/Manganese/ Seleni/Zn 1 ml/ Insulin Human Regular 15 unit/ 

Total Parenteral Nutrition/Amino Acids/Dextrose/ Fat Emulsion Intravenous 1,800 

ml @  75 mls/hr TPN  CONT IV  Last administered on 5/21/20at 22:01;  Start 

5/21/20 at 22:00;  Stop 5/22/20 at 21:59;  Status DC


Saliva Substitute (Biotene Moisturizing Mouth) 2 spray PRN Q15MIN  PRN PO DRY 

MOUTH;  Start 5/21/20 at 11:00


Potassium Chloride 110 meq/ Magnesium Sulfate 20 meq/ Multivitamins 10 

ml/Chromium/ Copper/Manganese/ Seleni/Zn 1 ml/ Insulin Human Regular 15 unit/ 

Total Parenteral Nutrition/Amino Acids/Dextrose/ Fat Emulsion Intravenous 1,800 

ml @  75 mls/hr TPN  CONT IV  Last administered on 5/22/20at 22:21;  Start 

5/22/20 at 22:00;  Stop 5/23/20 at 21:59;  Status DC


Potassium Chloride 110 meq/ Magnesium Sulfate 20 meq/ Multivitamins 10 

ml/Chromium/ Copper/Manganese/ Seleni/Zn 1 ml/ Insulin Human Regular 15 unit/ 

Total Parenteral Nutrition/Amino Acids/Dextrose/ Fat Emulsion Intravenous 1,800 

ml @  75 mls/hr TPN  CONT IV  Last administered on 5/23/20at 22:04;  Start 

5/23/20 at 22:00;  Stop 5/24/20 at 21:59;  Status DC


Potassium Chloride 110 meq/ Magnesium Sulfate 20 meq/ Multivitamins 10 

ml/Chromium/ Copper/Manganese/ Seleni/Zn 1 ml/ Insulin Human Regular 15 unit/ 

Total Parenteral Nutrition/Amino Acids/Dextrose/ Fat Emulsion Intravenous 1,800 

ml @  75 mls/hr TPN  CONT IV  Last administered on 5/24/20at 22:48;  Start 

5/24/20 at 22:00;  Stop 5/25/20 at 21:59;  Status DC


Potassium Chloride 70 meq/ Magnesium Sulfate 20 meq/ Multivitamins 10 

ml/Chromium/ Copper/Manganese/ Seleni/Zn 1 ml/ Insulin Human Regular 15 unit/ 

Total Parenteral Nutrition/Amino Acids/Dextrose/ Fat Emulsion Intravenous 1,800 

ml @  75 mls/hr TPN  CONT IV  Last administered on 5/25/20at 21:39;  Start 

5/25/20 at 22:00;  Stop 5/26/20 at 21:59;  Status DC


Meropenem 500 mg/ Sodium Chloride 50 ml @  100 mls/hr Q6HRS IV  Last 

administered on 5/27/20at 06:02;  Start 5/25/20 at 18:00;  Stop 5/27/20 at 

09:59;  Status DC


Barium Sulfate (Varibar Thin Liquid Apple) 148 gm 1X  ONCE PO ;  Start 5/26/20 

at 11:45;  Stop 5/26/20 at 11:49;  Status DC


Potassium Chloride 70 meq/ Magnesium Sulfate 20 meq/ Multivitamins 10 

ml/Chromium/ Copper/Manganese/ Seleni/Zn 1 ml/ Insulin Human Regular 15 unit/ 

Total Parenteral Nutrition/Amino Acids/Dextrose/ Fat Emulsion Intravenous 1,800 

ml @  75 mls/hr TPN  CONT IV  Last administered on 5/26/20at 22:27;  Start 

5/26/20 at 22:00;  Stop 5/27/20 at 21:59;  Status DC


Piperacillin Sod/ Tazobactam Sod 3.375 gm/Sodium Chloride 50 ml @  100 mls/hr 

Q6HRS IV  Last administered on 6/4/20at 06:10;  Start 5/27/20 at 12:00;  Stop 

6/4/20 at 07:26;  Status DC


Potassium Chloride 70 meq/ Magnesium Sulfate 20 meq/ Multivitamins 10 

ml/Chromium/ Copper/Manganese/ Seleni/Zn 1 ml/ Insulin Human Regular 15 unit/ 

Total Parenteral Nutrition/Amino Acids/Dextrose/ Fat Emulsion Intravenous 1,800 

ml @  75 mls/hr TPN  CONT IV  Last administered on 5/27/20at 22:03;  Start 

5/27/20 at 22:00;  Stop 5/28/20 at 21:59;  Status DC


Potassium Chloride 70 meq/ Magnesium Sulfate 20 meq/ Multivitamins 10 

ml/Chromium/ Copper/Manganese/ Seleni/Zn 1 ml/ Insulin Human Regular 15 unit/ 

Total Parenteral Nutrition/Amino Acids/Dextrose/ Fat Emulsion Intravenous 1,800 

ml @  75 mls/hr TPN  CONT IV  Last administered on 5/28/20at 22:33;  Start 

5/28/20 at 22:00;  Stop 5/29/20 at 21:59;  Status DC


Potassium Chloride 70 meq/ Magnesium Sulfate 20 meq/ Multivitamins 10 

ml/Chromium/ Copper/Manganese/ Seleni/Zn 1 ml/ Insulin Human Regular 15 unit/ 

Total Parenteral Nutrition/Amino Acids/Dextrose/ Fat Emulsion Intravenous 1,800 

ml @  75 mls/hr TPN  CONT IV  Last administered on 5/29/20at 23:13;  Start 

5/29/20 at 22:00;  Stop 5/30/20 at 21:59;  Status DC


Potassium Chloride 80 meq/ Magnesium Sulfate 20 meq/ Multivitamins 10 

ml/Chromium/ Copper/Manganese/ Seleni/Zn 1 ml/ Insulin Human Regular 15 unit/ 

Total Parenteral Nutrition/Amino Acids/Dextrose/ Fat Emulsion Intravenous 1,800 

ml @  75 mls/hr TPN  CONT IV  Last administered on 5/30/20at 22:30;  Start 

5/30/20 at 22:00;  Stop 5/31/20 at 21:59;  Status DC


Potassium Chloride 80 meq/ Magnesium Sulfate 20 meq/ Multivitamins 10 

ml/Chromium/ Copper/Manganese/ Seleni/Zn 1 ml/ Insulin Human Regular 15 unit/ 

Total Parenteral Nutrition/Amino Acids/Dextrose/ Fat Emulsion Intravenous 1,800 

ml @  75 mls/hr TPN  CONT IV  Last administered on 5/31/20at 21:54;  Start 

5/31/20 at 22:00;  Stop 6/1/20 at 21:59;  Status DC


Potassium Chloride/Water 100 ml @  100 mls/hr 1X  ONCE IV  Last administered on 

6/1/20at 10:15;  Start 6/1/20 at 10:00;  Stop 6/1/20 at 10:59;  Status DC


Potassium Chloride 90 meq/ Magnesium Sulfate 20 meq/ Multivitamins 10 

ml/Chromium/ Copper/Manganese/ Seleni/Zn 1 ml/ Insulin Human Regular 20 unit/ 

Total Parenteral Nutrition/Amino Acids/Dextrose/ Fat Emulsion Intravenous 1,800 

ml @  75 mls/hr TPN  CONT IV  Last administered on 6/1/20at 22:28;  Start 6/1/20

at 22:00;  Stop 6/2/20 at 21:59;  Status DC


Potassium Chloride 90 meq/ Magnesium Sulfate 20 meq/ Multivitamins 10 ml/

mium/ Copper/Manganese/ Seleni/Zn 1 ml/ Insulin Human Regular 20 unit/ Total 

Parenteral Nutrition/Amino Acids/Dextrose/ Fat Emulsion Intravenous 1,800 ml @  

75 mls/hr TPN  CONT IV  Last administered on 6/2/20at 22:08;  Start 6/2/20 at 

22:00;  Stop 6/3/20 at 21:59;  Status DC


Lorazepam (Ativan Inj) 0.25 mg PRN Q4HRS  PRN IVP ANXIETY / AGITATION Last 

administered on 6/3/20at 07:55;  Start 6/3/20 at 07:30


Potassium Chloride 90 meq/ Magnesium Sulfate 20 meq/ Multivitamins 10 

ml/Chromium/ Copper/Manganese/ Seleni/Zn 1 ml/ Insulin Human Regular 20 unit/ 

Total Parenteral Nutrition/Amino Acids/Dextrose/ Fat Emulsion Intravenous 1,800 

ml @  75 mls/hr TPN  CONT IV  Last administered on 6/3/20at 23:13;  Start 6/3/20

at 22:00;  Stop 6/4/20 at 21:59;  Status DC


Furosemide (Lasix) 40 mg DAILY IVP  Last administered on 6/5/20at 11:14;  Start 

6/3/20 at 13:30


Fluoxetine HCl (PROzac) 20 mg QHS PEG  Last administered on 6/5/20at 21:58;  

Start 6/4/20 at 21:00


Fentanyl (Duragesic 50mcg/ Hr Patch) 1 patch Q72H TD  Last administered on 

6/4/20at 21:22;  Start 6/4/20 at 21:00


Potassium Chloride 40 meq/ Potassium Acetate 60 meq/Magnesium Sulfate 10 meq/ 

Multivitamins 10 ml/Chromium/ Copper/Manganese/ Seleni/Zn 1 ml/ Insulin Human 

Regular 20 unit/ Total Parenteral Nutrition/Amino Acids/Dextrose/ Fat Emulsion 

Intravenous 1,800 ml @  75 mls/hr TPN  CONT IV  Last administered on 6/5/20at 

00:03;  Start 6/4/20 at 22:00;  Stop 6/5/20 at 21:59;  Status DC


Potassium Acetate 80 meq/Magnesium Sulfate 5 meq/ Multivitamins 10 ml/Chromium/ 

Copper/Manganese/ Seleni/Zn 1 ml/ Insulin Human Regular 20 unit/ Total 

Parenteral Nutrition/Amino Acids/Dextrose/ Fat Emulsion Intravenous 1,920 ml @  

80 mls/hr TPN  CONT IV  Last administered on 6/5/20at 21:59;  Start 6/5/20 at 

22:00;  Stop 6/6/20 at 21:59


Potassium Acetate 60 meq/Magnesium Sulfate 5 meq/ Multivitamins 10 ml/Chromium/ 

Copper/Manganese/ Seleni/Zn 1 ml/ Insulin Human Regular 30 unit/ Total 

Parenteral Nutrition/Amino Acids/Dextrose/ Fat Emulsion Intravenous 1,920 ml @  

80 mls/hr TPN  CONT IV ;  Start 6/6/20 at 22:00;  Stop 6/7/20 at 21:59





Active Scripts


Active


Reported


Bisoprolol Fumarate 5 Mg Tablet 10 Mg PO DAILY


Vitals/I & O





Vital Sign - Last 24 Hours








 6/5/20 6/5/20 6/5/20 6/5/20





 13:45 14:42 15:03 17:00


 


Temp   98.1 





   98.1 


 


Pulse   138 134


 


Resp   20 22


 


B/P (MAP)   114/80 (91) 92/67 (75)


 


Pulse Ox 90 90 90 95


 


O2 Delivery Room Air Room Air Room Air Room Air


 


O2 Flow Rate 10.0 10.0  


 


    





    





 6/5/20 6/5/20 6/5/20 6/5/20





 17:26 18:00 19:00 20:00


 


Temp   99.3 





   99.3 


 


Pulse  135 134 134


 


Resp  24 20 22


 


B/P (MAP)  110/58 (75) 103/70 (81) 100/64 (76)


 


Pulse Ox  94 94 94


 


O2 Delivery Room Air Room Air Room Air Room Air


 


    





    





 6/5/20 6/5/20 6/5/20 6/5/20





 20:15 21:00 22:00 23:00


 


Pulse  138 140 143


 


Resp  22 22 22


 


B/P (MAP)  81/55 (64) 102/60 (74) 97/61 (73)


 


Pulse Ox  98 99 95


 


O2 Delivery Room Air Room Air Room Air Room Air





 6/5/20 6/6/20 6/6/20 6/6/20





 23:45 00:00 00:00 00:15


 


Temp   100.4 





   100.4 


 


Pulse   142 


 


Resp   22 


 


B/P (MAP)   97/57 (70) 


 


Pulse Ox 95  94 95


 


O2 Delivery Room Air Room Air Room Air Room Air


 


O2 Flow Rate 10.0   


 


    





    





 6/6/20 6/6/20 6/6/20 6/6/20





 01:00 02:00 03:00 04:00


 


Temp  99.5  





  99.5  


 


Pulse 138 136 134 129


 


Resp 22 22 18 20


 


B/P (MAP) 107/65 (79) 99/64 (76) 92/54 (67) 89/56 (67)


 


Pulse Ox 95 96 94 95


 


O2 Delivery Room Air Room Air Room Air Room Air


 


    





    





 6/6/20 6/6/20 6/6/20 6/6/20





 04:00 05:00 06:00 06:57


 


Pulse  130 132 


 


Resp  20 20 


 


B/P (MAP)  90/61 (71) 99/71 (80) 


 


Pulse Ox  92 95 95


 


O2 Delivery Room Air Room Air Room Air Room Air


 


O2 Flow Rate    10.0





 6/6/20 6/6/20 6/6/20 6/6/20





 07:00 08:00 08:00 09:00


 


Temp 99.1   





 99.1   


 


Pulse 124 126  128


 


Resp 20 20 20


 


B/P (MAP) 99/71 (80) 105/75 (85)  112/77 (89)


 


Pulse Ox 95 95  95


 


O2 Delivery Room Air Room Air Room Air Room Air


 


    





    





 6/6/20 6/6/20 6/6/20 6/6/20





 10:00 11:00 11:20 11:35


 


Temp  100.2 100.2 100.2





  100.2 100.2 100.2


 


Pulse 128 125 125 124


 


Resp 26 26 26 26


 


B/P (MAP) 84/55 (65) 88/54 (65) 84/55 88/54


 


Pulse Ox 95 95  


 


O2 Delivery Room Air Room Air  














Intake and Output   


 


 6/5/20 6/5/20 6/6/20





 15:00 23:00 07:00


 


Output Total 175 ml 200 ml 165 ml


 


Balance -175 ml -200 ml -165 ml











Hemodynamically unstable?:  No


Is patient in severe pain?:  No


Is NPO status required?:  No











GRAEME MASSEY MD              Jun 6, 2020 12:04

## 2020-06-06 NOTE — NUR
Nursing Note:

Pt R side dressing changed where drain was removed. Moderate amount of sanguinous drainage. 
After turning from changing dressing, patient vomited dark green bile. Immediately sterile 
tracheal suction  performed. Pt given small dose of Fentanyl and Zofran. Sats mid to upper 
90s.

## 2020-06-06 NOTE — PDOC
PULMONARY PROGRESS NOTES


Subjective


Patient intubated on 3/23 , s/p trach 4/6, 


transferred back to ICU overnight for syncope with collapse. Apparently had a 

vasovagal episode in shower. Did not loose pulse


Anemia this am, blood drainage from RLQ abdomen surgical site, and surrounding 

firmness


Vitals





Vital Signs








  Date Time  Temp Pulse Resp B/P (MAP) Pulse Ox O2 Delivery O2 Flow Rate FiO2


 


6/6/20 06:57     95 Room Air 10.0 


 


6/6/20 06:00  132 20 99/71 (80)    


 


6/6/20 02:00 99.5       





 99.5       








ROS:  No Nausea, No Chest Pain, No Abdominal Pain, No Increase Cough


General:  No acute distress, Lethargic


Lungs:  Other (diminshed in bases, Rhonci in LLL)


Cardiovascular:  S1, S2


Abdomen:  Soft, Non-tender, Other (bleeding from Sx. site RLQ and firmness)


Extremities:  Other (+1 BLE edema)


Skin:  Warm, Dry


Labs





Laboratory Tests








Test


 6/4/20


12:53 6/4/20


18:08 6/4/20


22:50 6/5/20


05:15


 


Glucose (Fingerstick)


 174 mg/dL


(70-99) 139 mg/dL


(70-99) 202 mg/dL


(70-99) 





 


White Blood Count


 


 


 


 11.1 x10^3/uL


(4.0-11.0)


 


Red Blood Count


 


 


 


 3.30 x10^6/uL


(3.50-5.40)


 


Hemoglobin


 


 


 


 9.3 g/dL


(12.0-15.5)


 


Hematocrit


 


 


 


 29.6 %


(36.0-47.0)


 


Mean Corpuscular Volume    90 fL () 


 


Mean Corpuscular Hemoglobin    28 pg (25-35) 


 


Mean Corpuscular Hemoglobin


Concent 


 


 


 31 g/dL


(31-37)


 


Red Cell Distribution Width


 


 


 


 20.7 %


(11.5-14.5)


 


Platelet Count


 


 


 


 583 x10^3/uL


(140-400)


 


Neutrophils (%) (Auto)    72 % (31-73) 


 


Lymphocytes (%) (Auto)    20 % (24-48) 


 


Monocytes (%) (Auto)    7 % (0-9) 


 


Eosinophils (%) (Auto)    2 % (0-3) 


 


Basophils (%) (Auto)    0 % (0-3) 


 


Neutrophils # (Auto)


 


 


 


 8.0 x10^3/uL


(1.8-7.7)


 


Lymphocytes # (Auto)


 


 


 


 2.2 x10^3/uL


(1.0-4.8)


 


Monocytes # (Auto)


 


 


 


 0.7 x10^3/uL


(0.0-1.1)


 


Eosinophils # (Auto)


 


 


 


 0.2 x10^3/uL


(0.0-0.7)


 


Basophils # (Auto)


 


 


 


 0.0 x10^3/uL


(0.0-0.2)


 


Sodium Level


 


 


 


 151 mmol/L


(136-145)


 


Potassium Level


 


 


 


 4.0 mmol/L


(3.5-5.1)


 


Chloride Level


 


 


 


 114 mmol/L


()


 


Carbon Dioxide Level


 


 


 


 25 mmol/L


(21-32)


 


Anion Gap    12 (6-14) 


 


Blood Urea Nitrogen


 


 


 


 50 mg/dL


(7-20)


 


Creatinine


 


 


 


 1.3 mg/dL


(0.6-1.0)


 


Estimated GFR


(Cockcroft-Gault) 


 


 


 43.5 





 


Glucose Level


 


 


 


 194 mg/dL


(70-99)


 


Calcium Level


 


 


 


 11.0 mg/dL


(8.5-10.1)


 


Phosphorus Level


 


 


 


 4.4 mg/dL


(2.6-4.7)


 


Magnesium Level


 


 


 


 2.6 mg/dL


(1.8-2.4)


 


Thyroid Stimulating Hormone


(TSH) 


 


 


 3.722 uIU/mL


(0.358-3.74)


 


Test


 6/5/20


06:22 6/5/20


09:42 6/5/20


12:26 6/5/20


16:00


 


Glucose (Fingerstick)


 218 mg/dL


(70-99) 177 mg/dL


(70-99) 220 mg/dL


(70-99) 





 


White Blood Count


 


 


 


 32.2 x10^3/uL


(4.0-11.0)


 


Red Blood Count


 


 


 


 2.97 x10^6/uL


(3.50-5.40)


 


Hemoglobin


 


 


 


 8.4 g/dL


(12.0-15.5)


 


Hematocrit


 


 


 


 28.3 %


(36.0-47.0)


 


Mean Corpuscular Volume    95 fL () 


 


Mean Corpuscular Hemoglobin    28 pg (25-35) 


 


Mean Corpuscular Hemoglobin


Concent 


 


 


 30 g/dL


(31-37)


 


Red Cell Distribution Width


 


 


 


 20.8 %


(11.5-14.5)


 


Platelet Count


 


 


 


 787 x10^3/uL


(140-400)


 


Sodium Level


 


 


 


 151 mmol/L


(136-145)


 


Potassium Level


 


 


 


 4.6 mmol/L


(3.5-5.1)


 


Chloride Level


 


 


 


 113 mmol/L


()


 


Carbon Dioxide Level


 


 


 


 19 mmol/L


(21-32)


 


Anion Gap    19 (6-14) 


 


Blood Urea Nitrogen


 


 


 


 58 mg/dL


(7-20)


 


Creatinine


 


 


 


 1.8 mg/dL


(0.6-1.0)


 


Estimated GFR


(Cockcroft-Gault) 


 


 


 29.9 





 


BUN/Creatinine Ratio    32 (6-20) 


 


Glucose Level


 


 


 


 241 mg/dL


(70-99)


 


Calcium Level


 


 


 


 11.0 mg/dL


(8.5-10.1)


 


Total Bilirubin


 


 


 


 0.5 mg/dL


(0.2-1.0)


 


Aspartate Amino Transf


(AST/SGOT) 


 


 


 31 U/L (15-37) 





 


Alanine Aminotransferase


(ALT/SGPT) 


 


 


 23 U/L (14-59) 





 


Alkaline Phosphatase


 


 


 


 131 U/L


()


 


Total Protein


 


 


 


 7.0 g/dL


(6.4-8.2)


 


Albumin


 


 


 


 1.9 g/dL


(3.4-5.0)


 


Albumin/Globulin Ratio    0.4 (1.0-1.7) 


 


Test


 6/5/20


17:45 6/5/20


23:35 6/6/20


05:53 6/6/20


05:55


 


Prothrombin Time


 15.3 SEC


(11.7-14.0) 


 


 





 


Prothromb Time International


Ratio 1.3 (0.8-1.1) 


 


 


 





 


Glucose (Fingerstick)


 209 mg/dL


(70-99) 178 mg/dL


(70-99) 216 mg/dL


(70-99) 





 


Lactic Acid Level


 1.9 mmol/L


(0.4-2.0) 


 


 





 


Sodium Level


 


 


 


 147 mmol/L


(136-145)


 


Potassium Level


 


 


 


 5.1 mmol/L


(3.5-5.1)


 


Chloride Level


 


 


 


 113 mmol/L


()


 


Carbon Dioxide Level


 


 


 


 26 mmol/L


(21-32)


 


Anion Gap    8 (6-14) 


 


Blood Urea Nitrogen


 


 


 


 69 mg/dL


(7-20)


 


Creatinine


 


 


 


 1.9 mg/dL


(0.6-1.0)


 


Estimated GFR


(Cockcroft-Gault) 


 


 


 28.1 





 


Glucose Level


 


 


 


 240 mg/dL


(70-99)


 


Calcium Level


 


 


 


 10.4 mg/dL


(8.5-10.1)


 


Phosphorus Level


 


 


 


 4.0 mg/dL


(2.6-4.7)


 


Magnesium Level


 


 


 


 2.4 mg/dL


(1.8-2.4)


 


Test


 6/6/20


06:45 


 


 





 


White Blood Count


 21.2 x10^3/uL


(4.0-11.0) 


 


 





 


Red Blood Count


 2.44 x10^6/uL


(3.50-5.40) 


 


 





 


Hemoglobin


 7.0 g/dL


(12.0-15.5) 


 


 





 


Hematocrit


 22.2 %


(36.0-47.0) 


 


 





 


Mean Corpuscular Volume 91 fL ()    


 


Mean Corpuscular Hemoglobin 29 pg (25-35)    


 


Mean Corpuscular Hemoglobin


Concent 31 g/dL


(31-37) 


 


 





 


Red Cell Distribution Width


 21.0 %


(11.5-14.5) 


 


 





 


Platelet Count


 570 x10^3/uL


(140-400) 


 


 





 


Neutrophils (%) (Auto) 85 % (31-73)    


 


Lymphocytes (%) (Auto) 11 % (24-48)    


 


Monocytes (%) (Auto) 4 % (0-9)    


 


Eosinophils (%) (Auto) 0 % (0-3)    


 


Basophils (%) (Auto) 0 % (0-3)    


 


Neutrophils # (Auto)


 18.0 x10^3/uL


(1.8-7.7) 


 


 





 


Lymphocytes # (Auto)


 2.2 x10^3/uL


(1.0-4.8) 


 


 





 


Monocytes # (Auto)


 0.9 x10^3/uL


(0.0-1.1) 


 


 





 


Eosinophils # (Auto)


 0.1 x10^3/uL


(0.0-0.7) 


 


 





 


Basophils # (Auto)


 0.0 x10^3/uL


(0.0-0.2) 


 


 











Laboratory Tests








Test


 6/5/20


09:42 6/5/20


12:26 6/5/20


16:00 6/5/20


17:45


 


Glucose (Fingerstick)


 177 mg/dL


(70-99) 220 mg/dL


(70-99) 


 209 mg/dL


(70-99)


 


White Blood Count


 


 


 32.2 x10^3/uL


(4.0-11.0) 





 


Red Blood Count


 


 


 2.97 x10^6/uL


(3.50-5.40) 





 


Hemoglobin


 


 


 8.4 g/dL


(12.0-15.5) 





 


Hematocrit


 


 


 28.3 %


(36.0-47.0) 





 


Mean Corpuscular Volume   95 fL ()  


 


Mean Corpuscular Hemoglobin   28 pg (25-35)  


 


Mean Corpuscular Hemoglobin


Concent 


 


 30 g/dL


(31-37) 





 


Red Cell Distribution Width


 


 


 20.8 %


(11.5-14.5) 





 


Platelet Count


 


 


 787 x10^3/uL


(140-400) 





 


Sodium Level


 


 


 151 mmol/L


(136-145) 





 


Potassium Level


 


 


 4.6 mmol/L


(3.5-5.1) 





 


Chloride Level


 


 


 113 mmol/L


() 





 


Carbon Dioxide Level


 


 


 19 mmol/L


(21-32) 





 


Anion Gap   19 (6-14)  


 


Blood Urea Nitrogen


 


 


 58 mg/dL


(7-20) 





 


Creatinine


 


 


 1.8 mg/dL


(0.6-1.0) 





 


Estimated GFR


(Cockcroft-Gault) 


 


 29.9 


 





 


BUN/Creatinine Ratio   32 (6-20)  


 


Glucose Level


 


 


 241 mg/dL


(70-99) 





 


Calcium Level


 


 


 11.0 mg/dL


(8.5-10.1) 





 


Total Bilirubin


 


 


 0.5 mg/dL


(0.2-1.0) 





 


Aspartate Amino Transf


(AST/SGOT) 


 


 31 U/L (15-37) 


 





 


Alanine Aminotransferase


(ALT/SGPT) 


 


 23 U/L (14-59) 


 





 


Alkaline Phosphatase


 


 


 131 U/L


() 





 


Total Protein


 


 


 7.0 g/dL


(6.4-8.2) 





 


Albumin


 


 


 1.9 g/dL


(3.4-5.0) 





 


Albumin/Globulin Ratio   0.4 (1.0-1.7)  


 


Prothrombin Time


 


 


 


 15.3 SEC


(11.7-14.0)


 


Prothromb Time International


Ratio 


 


 


 1.3 (0.8-1.1) 





 


Lactic Acid Level


 


 


 


 1.9 mmol/L


(0.4-2.0)


 


Test


 6/5/20


23:35 6/6/20


05:53 6/6/20


05:55 6/6/20


06:45


 


Glucose (Fingerstick)


 178 mg/dL


(70-99) 216 mg/dL


(70-99) 


 





 


Sodium Level


 


 


 147 mmol/L


(136-145) 





 


Potassium Level


 


 


 5.1 mmol/L


(3.5-5.1) 





 


Chloride Level


 


 


 113 mmol/L


() 





 


Carbon Dioxide Level


 


 


 26 mmol/L


(21-32) 





 


Anion Gap   8 (6-14)  


 


Blood Urea Nitrogen


 


 


 69 mg/dL


(7-20) 





 


Creatinine


 


 


 1.9 mg/dL


(0.6-1.0) 





 


Estimated GFR


(Cockcroft-Gault) 


 


 28.1 


 





 


Glucose Level


 


 


 240 mg/dL


(70-99) 





 


Calcium Level


 


 


 10.4 mg/dL


(8.5-10.1) 





 


Phosphorus Level


 


 


 4.0 mg/dL


(2.6-4.7) 





 


Magnesium Level


 


 


 2.4 mg/dL


(1.8-2.4) 





 


White Blood Count


 


 


 


 21.2 x10^3/uL


(4.0-11.0)


 


Red Blood Count


 


 


 


 2.44 x10^6/uL


(3.50-5.40)


 


Hemoglobin


 


 


 


 7.0 g/dL


(12.0-15.5)


 


Hematocrit


 


 


 


 22.2 %


(36.0-47.0)


 


Mean Corpuscular Volume    91 fL () 


 


Mean Corpuscular Hemoglobin    29 pg (25-35) 


 


Mean Corpuscular Hemoglobin


Concent 


 


 


 31 g/dL


(31-37)


 


Red Cell Distribution Width


 


 


 


 21.0 %


(11.5-14.5)


 


Platelet Count


 


 


 


 570 x10^3/uL


(140-400)


 


Neutrophils (%) (Auto)    85 % (31-73) 


 


Lymphocytes (%) (Auto)    11 % (24-48) 


 


Monocytes (%) (Auto)    4 % (0-9) 


 


Eosinophils (%) (Auto)    0 % (0-3) 


 


Basophils (%) (Auto)    0 % (0-3) 


 


Neutrophils # (Auto)


 


 


 


 18.0 x10^3/uL


(1.8-7.7)


 


Lymphocytes # (Auto)


 


 


 


 2.2 x10^3/uL


(1.0-4.8)


 


Monocytes # (Auto)


 


 


 


 0.9 x10^3/uL


(0.0-1.1)


 


Eosinophils # (Auto)


 


 


 


 0.1 x10^3/uL


(0.0-0.7)


 


Basophils # (Auto)


 


 


 


 0.0 x10^3/uL


(0.0-0.2)








Medications





Active Scripts








 Medications  Dose


 Route/Sig


 Max Daily Dose Days Date Category


 


 Bisoprolol


 Fumarate 5 Mg


 Tablet  10 Mg


 PO DAILY


   3/16/20 Reported








Comments


CXR 6/6/2020








IMPRESSION: Lines and tubes as described above. Left greater than right 


lower lobe opacities most likely pulmonary edema and left greater than 


right mild pleural effusions are stable. Superimposed left lower lobe 


pneumonia is not excluded.





Impression


.


IMPRESSION:


1.  Acute hypoxemic respiratory failure secondary to ARDS status post trach,


2.  Gallstone pancreatitis/ not a surgical candidate


3.  Severe metabolic acidosis.stable


4.  Acute kidney injury-stable, Off HD--worsening today 


5.  Acute gallstone pancreatitis.


6.  Hypoalbuminemia.


7.  Moderate persistent effusions, s/p left thora  5/12 (sympathetic effusion 

from abdominal process)


8.  Fever-  Per ID, per surgery--resolved 


9.  Chronic anemia-- ongoing / oozing of blood from drain site


10. Covid 19 testing negative


11. Moderate to large ascites-S/P paracentisis


12.S/P paracentisis with 4 liters removed on 4/15/20


13. S/P IR drain placement on 5/8/2020


14. Depression/Anxiety





Plan


.


1.Continue supplemental oxygen via trach shield--  PMV/capping as tolerated/ prn

suction. add O2 if sats < 92%


2. s/p  thoracentesis, 5/12, 3 litres removed


3. Follow surgery recs-- Acute pancreatitis with persistent necrosis ---- 4/27 

status post ROBERT drain placement + C paropsilosis; 5/6 + yeast & high amylase; s/p

additional drains on 5/8 and removal/ Now intermittent bleeding from drain site/

surgeon informed.


4. Follow ID recs for ABX-- currently off ABX leukocytosis.


5. Follow nephrology recs --- cr. is worsening 


6. Continue TPN 


7. monitor HGB, transfuse 1 unit 


8. CXR reviewed 


9. Check amalyse and lipase, obtain CT ABD/PLVS





DVT/GI PPX: lovenox / protonix--- hold lovenox 2/2 anemia


PT/OT


D/W RN 





CODE:FULL











SHARYN SOLORZANO MD                  Jun 6, 2020 09:18

## 2020-06-06 NOTE — RAD
AP chest x-ray

 

COMPARISON: Chest x-ray May 30, 2020.

 

HISTORY: Aspiration.

 

FINDINGS: Tracheostomy. Left PICC line tip distal SVC. The right PICC line

on the prior exam has been removed. Nasogastric tube extends to the 

abdomen outside the field-of-view. Heart size normal. No pneumothorax. 

Mild left greater than right pleural effusions again demonstrated along 

with asymmetric opacity at the left lower lobe. Mild interstitial linear 

densities likely representing mild edema at the lung bases also present. 

Bones are unremarkable.

 

IMPRESSION: Lines and tubes as described above. Left greater than right 

lower lobe opacities most likely pulmonary edema and left greater than 

right mild pleural effusions are stable. Superimposed left lower lobe 

pneumonia is not excluded.

 

Electronically signed by: Farhat Wallace MD (6/6/2020 8:25 AM) [id number]

## 2020-06-06 NOTE — PDOC
Infectious Disease Note


Subjective


Subjective


Transferred to MICU


Apparently passed out while showering, weak pulse, CPR started


This morning patient c/o abdominal discomfort and nausea.


Low-grade fever, no chills/SOA/CP 


SpO2 98% on room air





ROS


ROS


as mentioned above





Vital Sign


Vital Signs





Vital Signs








  Date Time  Temp Pulse Resp B/P (MAP) Pulse Ox O2 Delivery O2 Flow Rate FiO2


 


6/6/20 06:57     95 Room Air 10.0 


 


6/6/20 06:00  132 20 99/71 (80)    


 


6/6/20 02:00 99.5       





 99.5       











Physical Exam


PHYSICAL EXAM


GENERAL: Propped up in bed, appears weak, pale 


HEENT: NGT in place. Oral mucosa is semi-dry


NECK:  Trach present


LUNGS: Clear anteriorly, no accessory muscle use 


HEART:  S1, S2, regular 


ABDOMEN: Mild distention, bowel sounds present, soft, tender.


 Drains out, some bloody drainage on the right  


: [place] (4/14)


EXTREMITIES: No gross edema or cyanosis 


SKIN: Pale, no signs of rash 


CNS: Awake, nods and mouths words to questions appropriately generalized 

weakness.  


LUE-PICC (5/29) without signs of complications





Labs


Lab





Laboratory Tests








Test


 6/5/20


09:42 6/5/20


12:26 6/5/20


16:00 6/5/20


17:45


 


Glucose (Fingerstick)


 177 mg/dL


(70-99) 220 mg/dL


(70-99) 


 209 mg/dL


(70-99)


 


White Blood Count


 


 


 32.2 x10^3/uL


(4.0-11.0) 





 


Red Blood Count


 


 


 2.97 x10^6/uL


(3.50-5.40) 





 


Hemoglobin


 


 


 8.4 g/dL


(12.0-15.5) 





 


Hematocrit


 


 


 28.3 %


(36.0-47.0) 





 


Mean Corpuscular Volume   95 fL ()  


 


Mean Corpuscular Hemoglobin   28 pg (25-35)  


 


Mean Corpuscular Hemoglobin


Concent 


 


 30 g/dL


(31-37) 





 


Red Cell Distribution Width


 


 


 20.8 %


(11.5-14.5) 





 


Platelet Count


 


 


 787 x10^3/uL


(140-400) 





 


Sodium Level


 


 


 151 mmol/L


(136-145) 





 


Potassium Level


 


 


 4.6 mmol/L


(3.5-5.1) 





 


Chloride Level


 


 


 113 mmol/L


() 





 


Carbon Dioxide Level


 


 


 19 mmol/L


(21-32) 





 


Anion Gap   19 (6-14)  


 


Blood Urea Nitrogen


 


 


 58 mg/dL


(7-20) 





 


Creatinine


 


 


 1.8 mg/dL


(0.6-1.0) 





 


Estimated GFR


(Cockcroft-Gault) 


 


 29.9 


 





 


BUN/Creatinine Ratio   32 (6-20)  


 


Glucose Level


 


 


 241 mg/dL


(70-99) 





 


Calcium Level


 


 


 11.0 mg/dL


(8.5-10.1) 





 


Total Bilirubin


 


 


 0.5 mg/dL


(0.2-1.0) 





 


Aspartate Amino Transf


(AST/SGOT) 


 


 31 U/L (15-37) 


 





 


Alanine Aminotransferase


(ALT/SGPT) 


 


 23 U/L (14-59) 


 





 


Alkaline Phosphatase


 


 


 131 U/L


() 





 


Total Protein


 


 


 7.0 g/dL


(6.4-8.2) 





 


Albumin


 


 


 1.9 g/dL


(3.4-5.0) 





 


Albumin/Globulin Ratio   0.4 (1.0-1.7)  


 


Prothrombin Time


 


 


 


 15.3 SEC


(11.7-14.0)


 


Prothromb Time International


Ratio 


 


 


 1.3 (0.8-1.1) 





 


Lactic Acid Level


 


 


 


 1.9 mmol/L


(0.4-2.0)


 


Test


 6/5/20


23:35 6/6/20


05:53 6/6/20


05:55 6/6/20


06:45


 


Glucose (Fingerstick)


 178 mg/dL


(70-99) 216 mg/dL


(70-99) 


 





 


Sodium Level


 


 


 147 mmol/L


(136-145) 





 


Potassium Level


 


 


 5.1 mmol/L


(3.5-5.1) 





 


Chloride Level


 


 


 113 mmol/L


() 





 


Carbon Dioxide Level


 


 


 26 mmol/L


(21-32) 





 


Anion Gap   8 (6-14)  


 


Blood Urea Nitrogen


 


 


 69 mg/dL


(7-20) 





 


Creatinine


 


 


 1.9 mg/dL


(0.6-1.0) 





 


Estimated GFR


(Cockcroft-Gault) 


 


 28.1 


 





 


Glucose Level


 


 


 240 mg/dL


(70-99) 





 


Calcium Level


 


 


 10.4 mg/dL


(8.5-10.1) 





 


Phosphorus Level


 


 


 4.0 mg/dL


(2.6-4.7) 





 


Magnesium Level


 


 


 2.4 mg/dL


(1.8-2.4) 





 


White Blood Count


 


 


 


 21.2 x10^3/uL


(4.0-11.0)


 


Red Blood Count


 


 


 


 2.44 x10^6/uL


(3.50-5.40)


 


Hemoglobin


 


 


 


 7.0 g/dL


(12.0-15.5)


 


Hematocrit


 


 


 


 22.2 %


(36.0-47.0)


 


Mean Corpuscular Volume    91 fL () 


 


Mean Corpuscular Hemoglobin    29 pg (25-35) 


 


Mean Corpuscular Hemoglobin


Concent 


 


 


 31 g/dL


(31-37)


 


Red Cell Distribution Width


 


 


 


 21.0 %


(11.5-14.5)


 


Platelet Count


 


 


 


 570 x10^3/uL


(140-400)


 


Neutrophils (%) (Auto)    85 % (31-73) 


 


Lymphocytes (%) (Auto)    11 % (24-48) 


 


Monocytes (%) (Auto)    4 % (0-9) 


 


Eosinophils (%) (Auto)    0 % (0-3) 


 


Basophils (%) (Auto)    0 % (0-3) 


 


Neutrophils # (Auto)


 


 


 


 18.0 x10^3/uL


(1.8-7.7)


 


Lymphocytes # (Auto)


 


 


 


 2.2 x10^3/uL


(1.0-4.8)


 


Monocytes # (Auto)


 


 


 


 0.9 x10^3/uL


(0.0-1.1)


 


Eosinophils # (Auto)


 


 


 


 0.1 x10^3/uL


(0.0-0.7)


 


Basophils # (Auto)


 


 


 


 0.0 x10^3/uL


(0.0-0.2)








Micro


5/4. BLOOD CULTURE  Preliminary  


        NO GROWTH AFTER 5 DAYS 








4/27. abd fluid


  AEROBIC RES 1  Final  


        Organism Identification, Yeast


          Candida parapsilosis








5/6. abd fluid


     ANAEROBIC-AEROBIC CULTURE  


                    PENDING





Objective


Assessment


Fever intermittent could be from underlying pancreatitis vs aspiration 


Leukocytosis, likely reactive - improved off abx 


? Ileus with N/V


Abd distention - U/S and CT reviewed s/p 0.4 L of opaque, debris-containing 

ascites was removed 5/6


Acute pancreatitis with persistent necrosis


  - 4/27 s/p ROBERT drain placement + C paropsilosis; 5/6 + yeast & high amylase; 

s/p additional drains on 5/8. Drains now removed 


Anemia - S/p PRBCs


Cholelithiasis with thickening of the gallbladder wall.


JED, hyperkalemia, Metabolic acidosis off dialysis


Acute hypoxic resp failure ,bilateral pleural effusion and atelectasis


hypocalcemia 


Prediabetes


HTN


s/p trach





Plan


Plan of Care


Off antibiotics


PRBCs underway 


f/u am labs 


Aggressive pulmonary toilet


Maintain aspiration precaution


Supportive care


PT and OT as tolerated





D/w nursing 





Patient seen. Chart reviewed in detail. Case discussed with NP. I agree with 

above plan.cor-formulated with NP











HAVEN WINTERS         Jun 6, 2020 09:17


BISMARK HERNANDEZ MD              Jun 6, 2020 22:48

## 2020-06-06 NOTE — NUR
Pharmacy TPN Dosing Note



S: JESENIA BEAN is a 49 year old F Currently receiving Central Continuous TPN started 
03/18/20



B:Pertinent PMH: Necrotizing pancreatitis



Height: 5 feet, 8 inches

Weight: 69.0 kg



Current diet: NPO 



LABS:

Sodium:    4147 

Potassium: 5.1 

Chloride:  113 

Calcium:   10.4 

Corrected Calcium: 12.00 

Magnesium: 2.4 

CO2:       26 

SCr:       1.9 

Glucose:   216, 240 

Albumin:   2.0 

AST:       15 

ALT:       14 



TPN FORMULA:

TPN TYPE:  Central Continuous

AMINO ACIDS:         75 gm

DEXTROSE:            250 gm

LIPIDS:              20 gm

POTASSIUM ACETATE:   60 mEq

MAGNESIUM:           5 mEq

INSULIN:             30 units

MULTIPLE VITAMIN:    10 ml

TRACE ELEMENTS:      1 ml



TPN PLAN:  

-Serum sodium trending down with free water addition.

-Serum potassium elevated, reduce KAC to 60 mEq/day.

-Blood glucose above goal range, increase regular insulin to 30 units/day.

-BMP tomorrow.





R: Continue TPN @ current rate and with above formula. 

Will monitor electrolytes, glucose, and tolerance to TPN.



 VALERIE SNELL [facility], 06/06/20 1007

## 2020-06-06 NOTE — RAD
CT abdomen and pelvis without contrast

 

PQRS statement: CT scans at this facility use dose reduction including 

either automated exposure control, iterative reconstructions, and /or 

weight based radiation dosing via mA and kV modification when appropriate 

to reduce radiation dose to as low as reasonably achievable.

 

HISTORY: Abdominal pain. Pancreatitis. Sepsis.

 

COMPARISON: CT abdomen and pelvis May 27, 2020.

 

Abdomen findings: Small right pleural effusion. Moderate to large left 

pleural effusion with volume loss collapse with atelectasis of most of the

left lower lobe. This is similar the prior study. Nasogastric tube. The 

right percutaneous pigtail drainage catheter at the lower pararenal 

retroperitoneum on the prior exam has since been removed, no 

pneumoperitoneum or retroperitoneal soft tissue emphysema evident. 

Gallstone. 1.2 cm liver cyst stable. Mild right renal hydronephrosis renal

pelvis diameter is 1 cm. Spleen, left kidney, adrenals unremarkable. Much 

of the density of the pancreas is absent at the proximal tail and body and

head the distal tail is somewhat still present, this raises the 

probability of pancreatic necrosis from pancreatitis similar the prior 

exam, could be further assessed with postcontrast imaging. There is a 

dominant organized fluid collection surrounding the pancreas again 

demonstrated typical of pseudocyst with extensive multiloculated fluid 

collections within the retroperitoneum along the anterior and posterior 

pararenal spaces extending to the upper pelvis along the upper iliac 

crests again demonstrated, globally the size of the fluid collections are 

somewhat larger along the right anterior pararenal space is an posterior 

pararenal space, left-sided fluid collections are grossly stable. Bowel 

loops are displaced by these fluid collections. No bowel obstruction or 

inflammation evident. Appendix is negative.

 

Pelvis findings: Mild dependent pelvic free fluid stable. Left lower 

quadrant perigastric contrast catheter is been removed. Bladder, rectum, 

uterus, ovaries and bones are unremarkable.

 

IMPRESSION:

1. Removal of the percutaneous pigtail drainage catheters since the prior 

exam. Sequela of pancreatitis with extensive pseudocysts again 

demonstrated, the right-sided collections are slightly larger since the 

prior exam, the left-sided collections are stable. See above.

2. Moderate to large left pleural effusion with atelectasis and collapse 

of most of the left lower lobe, stable. Small right pleural effusion is 

stable.

3. Gallstone.

 

Electronically signed by: Farhat Wallace MD (6/6/2020 12:32 PM) 

[id number]

## 2020-06-06 NOTE — PDOC
GENERAL


General:


Patient examined chart reviewed today is hospital day 82 for this patient 

admitted with gallstone pancreatitis complicated by severe sepsis and respira

tory failure.  She has had a very idania course unfortunately has had very slow 

progress and is still TPN dependent.  She also now has a tracheostomy.  She has 

been in and out of the intensive care unit since the beginning of her stay and 

readmitted there yesterday after being found down on the ground likely after a 

vasovagal syncopal event after walking.  She is nonverbal very sleepy this 

morning I am evaluating her with nurse at bedside who is very helpful in 

outlining her course.  Patient had her abdominal drains removed yesterday and 

hemoglobin has dropped this morning abdomen is more distended concern for 

abdominal hematoma.  Plan is for a CT abdomen and pelvis today.  Appreciate 

subspecialty support.  We will continue current management otherwise.  

Infectious diseases is guiding antibiotics.  Total time today is 30 minutes with

greater than 50% in counseling and coordination of care most of which in 

discussion with nursing and patient.


Problems:  


(1) Sepsis


(2) Tracheostomy dependence


(3) Gallstone pancreatitis


(4) Chronic respiratory failure





VITAL SIGNS


Vital Signs/I&O:





                                   Vital Signs








  Date Time  Temp Pulse Resp B/P (MAP) Pulse Ox O2 Delivery O2 Flow Rate FiO2


 


20 09:00  128 20 112/77 (89) 95 Room Air  


 


20 07:00 99.1       





 99.1       


 


20 06:57       10.0 














                                    I & O   


 


 20





 15:00 23:00 07:00


 


Output Total 175 ml 200 ml 165 ml


 


Balance -175 ml -200 ml -165 ml








In general the patient is very somnolent awakens and interacts she is 

non-verbal.  She has no trach shield on this morning.  She does appear 

comfortable


HEENT exam is notable for that her trach is intact she is not wearing a trach 

shield this morning


Chest bilateral equal air entry though diminished throughout no crackles or 

wheezes are noted


Heart S1-S2 normal tachycardic no murmurs or gallops are noted


Abdomen is distended bowel sounds are absent she is mildly tender throughout 

drains were removed this morning


Extremity exam is unremarkable for acute abnormality





ALLERGIES


Allergies:





Allergies








Coded Allergies Type Severity Reaction Last Updated Verified


 


  codeine Allergy Intermediate rash 3/16/20 Yes











MEDS


Medications:





Current Medications








 Medications


  (Trade)  Dose


 Ordered  Sig/Yee  Start Time


 Stop Time Status Last Admin


Dose Admin


 


 Acetaminophen


  (Tylenol Supp)  650 mg  PRN Q6HRS  PRN  3/24/20 10:30


    20 23:37





 


 Acetaminophen


  (Tylenol)  650 mg  PRN Q6HRS  PRN  3/21/20 03:36


 20 10:25 DC 20 19:56





 


 Albumin Human  100 ml @ 


 100 mls/hr  1X PRN  PRN  20 01:30


     





 


 Albuterol Sulfate


  (Ventolin Neb


 Soln)  2.5 mg  1X  ONCE  3/17/20 22:30


 3/17/20 22:31 DC 3/18/20 00:56





 


 Alteplase,


 Recombinant


  (Cathflo For


 Central Catheter


 Clearance)  1 mg  1X  ONCE  20 10:45


 20 10:46 DC 20 11:44





 


 Amino Acids/


 Glycerin/


 Electrolytes  1,000 ml @ 


 75 mls/hr  [id number]  20 21:15


   UNV  





 


 Artificial Tears


  (Artificial


 Tears)  1 drop  PRN Q15MIN  PRN  20 05:30


    20 10:08





 


 Atenolol


  (Tenormin)  100 mg  DAILY  3/17/20 09:00


 3/16/20 20:08 DC  





 


 Atropine Sulfate


  (ATROPINE 0.5mg


 SYRINGE)  0.5 mg  PRN Q5MIN  PRN  20 08:15


     





 


 Barium Sulfate


  (Varibar Thin


 Liquid Apple)  148 gm  1X  ONCE  20 11:45


 20 11:49 DC  





 


 Benzocaine


  (Hurricaine One)  1 spray  1X  ONCE  3/20/20 14:30


 3/20/20 14:31 DC 3/20/20 16:38





 


 Bisacodyl


  (Dulcolax Supp)  10 mg  STK-MED ONCE  20 10:59


 20 10:59 DC  





 


 Bumetanide


  (Bumex)  2 mg  DAILY  20 10:00


 20 17:15 DC 20 08:07





 


 Bupivacaine HCl/


 Epinephrine Bitart


  (Sensorcain-Epi


 0.5%-1:321150 Mpf)  30 ml  STK-MED ONCE  20 10:58


 20 10:58 DC 20 12:01





 


 Calcium Carbonate/


 Glycine


  (Tums)  500 mg  PRN AFTMEALHC  PRN  3/18/20 17:45


 20 10:25 DC  





 


 Calcium Chloride


 1000 mg/Sodium


 Chloride  110 ml @ 


 220 mls/hr  1X  ONCE  3/17/20 22:30


 3/17/20 22:59 DC 3/17/20 22:11





 


 Calcium Chloride


 3000 mg/Sodium


 Chloride  1,030 ml @ 


 50 mls/hr  [id number]  3/19/20 08:00


 3/21/20 15:23 DC 3/21/20 02:17





 


 Calcium Gluconate


  (Calcium


 Gluconate)  2,000 mg  1X  ONCE  3/19/20 02:15


 3/19/20 02:16 DC 3/19/20 02:19





 


 Calcium Gluconate


 1000 mg/Sodium


 Chloride  110 ml @ 


 220 mls/hr  1X  ONCE  3/18/20 03:30


 3/18/20 03:59 DC 3/18/20 03:21





 


 Calcium Gluconate


 2000 mg/Sodium


 Chloride  120 ml @ 


 220 mls/hr  1X  ONCE  3/18/20 07:30


 3/18/20 08:02 DC 3/18/20 09:05





 


 Cefepime HCl


  (Maxipime)  2 gm  Q12HR  3/25/20 09:00


 20 09:58 DC 20 20:56





 


 Cellulose


  (Surgicel


 Fibrillar 1x2)  1 each  STK-MED ONCE  20 11:00


 20 11:01 DC  





 


 Cellulose


  (Surgicel


 Hemostat 2x14)  1 each  STK-MED ONCE  20 10:58


 20 10:59 DC  





 


 Cellulose


  (Surgicel


 Hemostat 4x8)  1 each  STK-MED ONCE  20 10:58


 20 10:59 DC  





 


 Cyclobenzaprine


 HCl


  (Flexeril)  10 mg  PRN Q6HRS  PRN  20 10:45


     





 


 Daptomycin 430 mg/


 Sodium Chloride  50 ml @ 


 100 mls/hr  Q24H  20 13:00


 20 20:58 DC 20 13:00





 


 Daptomycin 450 mg/


 Sodium Chloride  50 ml @ 


 100 mls/hr  Q24H  20 09:00


 20 08:30 DC 20 09:25





 


 Daptomycin 485 mg/


 Sodium Chloride  50 ml @ 


 100 mls/hr  Q24H  20 11:00


 20 07:44 DC 20 13:10





 


 Daptomycin 500 mg/


 Sodium Chloride  50 ml @ 


 100 mls/hr  Q48H  3/25/20 08:30


 4/10/20 10:07 DC 4/10/20 09:57





 


 Dexamethasone


 Sodium Phosphate


  (Decadron)  4 mg  STK-MED ONCE  20 10:56


 20 10:57 DC  





 


 Dexmedetomidine


 HCl 400 mcg/


 Sodium Chloride  100 ml @ 0


 mls/hr  CONT  PRN  20 08:15


 20 18:31 DC 20 12:57





 


 Dextrose


  (Dextrose


 50%-Water Syringe)  12.5 gm  PRN Q15MIN  PRN  3/16/20 09:30


     





 


 Digoxin


  (Lanoxin)  125 mcg  1X  ONCE  3/19/20 18:00


 3/19/20 18:01 DC 3/19/20 17:10





 


 Diphenhydramine


 HCl


  (Benadryl)  25 mg  1X PRN  PRN  20 15:45


 20 15:44 DC  





 


 Duloxetine HCl


  (Cymbalta)  30 mg  DAILY  5/10/20 14:00


 20 10:25 DC 20 09:48





 


 Enoxaparin Sodium


  (Lovenox 100mg


 Syringe)  100 mg  Q12HR  20 21:00


   UNV  





 


 Enoxaparin Sodium


  (Lovenox 40mg


 Syringe)  40 mg  Q24H  20 17:00


    20 17:44





 


 Etomidate


  (Amidate)  8 mg  1X  ONCE  3/23/20 08:30


 3/23/20 08:31 DC 3/23/20 08:33





 


 Fentanyl


  (Duragesic 50mcg/


 Hr Patch)  1 patch  Q72H  20 21:00


    20 21:22





 


 Fentanyl Citrate


  (Fentanyl 2ml


 Vial)  25 mcg  PRN Q4HRS  PRN  20 13:15


    20 06:27





 


 Fentanyl Citrate


  (Fentanyl 5ml


 Vial)  250 mcg  1X  ONCE  20 09:15


 20 09:16 DC 20 09:30





 


 Fluoxetine HCl


  (PROzac)  20 mg  QHS  20 21:00


    20 21:58





 


 Furosemide


  (Lasix)  40 mg  DAILY  6/3/20 13:30


    20 11:14





 


 Haloperidol


 Lactate


  (Haldol Inj)  3 mg  1X  ONCE  20 14:30


 20 14:31 DC 20 14:37





 


 Heparin Sodium


  (Porcine)


  (Hep Lock Adult)  500 unit  STK-MED ONCE  20 09:29


 20 09:30 DC  





 


 Heparin Sodium


  (Porcine)


  (Heparin Sodium)  5,000 unit  Q12HR  20 21:00


 20 09:59 DC 20 20:57





 


 Heparin Sodium


  (Porcine) 1000


 unit/Sodium


 Chloride  1,001 ml @ 


 1,001 mls/hr  1X  ONCE  20 12:00


 20 12:59 DC  





 


 Hydromorphone HCl


  (Dilaudid


 Standard PCA)  12 mg  STK-MED ONCE  20 15:50


 20 11:24 DC  





 


 Hydromorphone HCl


  (Dilaudid)  1 mg  PRN Q4HRS  PRN  20 19:00


 20 17:10 DC 20 06:25





 


 Info


  (CONTRAST GIVEN


 -- Rx MONITORING)  1 each  PRN DAILY  PRN  3/30/20 11:45


 20 11:44 DC  





 


 Info


  (Icu Electrolyte


 Protocol)  1 ea  CONT PRN  PRN  3/29/20 13:15


     





 


 Info


  (PHARMACY


 MONITORING -- do


 not chart)  1 each  PRN DAILY  PRN  20 15:45


 20 14:14 DC  





 


 Info


  (Tpn Per


 Pharmacy)  1 each  PRN DAILY  PRN  3/18/20 12:30


   UNV  





 


 Insulin Human


 Lispro


  (HumaLOG)  0-9 UNITS  Q6HRS  3/16/20 09:30


    20 05:54





 


 Insulin Human


 Regular


  (HumuLIN R VIAL)  5 unit  1X  ONCE  3/17/20 22:30


 3/17/20 22:31 DC 3/17/20 22:14





 


 Iohexol


  (Omnipaque 240


 Mg/ml)  30 ml  1X  ONCE  3/30/20 11:30


 3/30/20 11:33 DC 3/30/20 11:30





 


 Iohexol


  (Omnipaque 300


 Mg/ml)  50 ml  STK-MED ONCE  20 10:58


 20 10:59 DC  





 


 Iohexol


  (Omnipaque 350


 Mg/ml)  90 ml  1X  ONCE  3/16/20 03:30


 3/16/20 03:31 DC 3/16/20 03:25





 


 Ketorolac


 Tromethamine


  (Toradol 30mg


 Vial)  30 mg  1X  ONCE  3/16/20 03:00


 3/16/20 03:01 DC 3/16/20 02:54





 


 Lidocaine HCl


  (Buffered


 Lidocaine 1%)  6 ml  1X  ONCE  20 14:15


 20 14:16 DC 20 14:15





 


 Lidocaine HCl


  (Glydo


  (Lidocaine) Jelly)  1 thomas  1X  ONCE  3/20/20 14:30


 3/20/20 14:31 DC 3/20/20 16:38





 


 Lidocaine HCl


  (Xylocaine-Mpf


 1% 2ml Vial)  2 ml  PRN 1X  PRN  20 07:00


 20 06:59 DC  





 


 Linezolid/Dextrose  300 ml @ 


 300 mls/hr  Q12HR  20 09:00


 20 08:11 DC 20 21:08





 


 Lorazepam


  (Ativan Inj)  0.25 mg  PRN Q4HRS  PRN  6/3/20 07:30


    6/3/20 07:55





 


 Magnesium Sulfate  50 ml @ 25


 mls/hr  1X  ONCE  5/10/20 09:00


 5/10/20 10:59 DC 5/10/20 10:44





 


 Meropenem 1 gm/


 Sodium Chloride  100 ml @ 


 200 mls/hr  Q8HRS  20 14:00


 20 09:31 DC 20 05:53





 


 Meropenem 500 mg/


 Sodium Chloride  50 ml @ 


 100 mls/hr  Q6HRS  20 18:00


 20 09:59 DC 20 06:02





 


 Metoclopramide HCl


  (Reglan Vial)  10 mg  PRN Q3HRS  PRN  20 16:45


    20 04:25





 


 Metoprolol


 Tartrate


  (Lopressor Vial)  5 mg  1X  ONCE  20 15:15


 20 15:16 DC 20 15:31





 


 Metronidazole  100 ml @ 


 100 mls/hr  Q8HRS  20 10:00


 20 08:10 DC 20 06:04





 


 Micafungin Sodium


 100 mg/Dextrose  100 ml @ 


 100 mls/hr  Q24H  20 11:00


 20 09:59 DC 20 12:17





 


 Midazolam HCl


  (Versed)  5 mg  1X  ONCE  20 09:15


 20 09:16 DC 20 09:30





 


 Midazolam HCl 100


 mg/Sodium Chloride  100 ml @ 7


 mls/hr  CONT  PRN  3/28/20 16:00


 6/3/20 14:38 DC 20 15:35





 


 Midazolam HCl 50


 mg/Sodium Chloride  50 ml @ 0


 mls/hr  CONT  PRN  3/23/20 08:15


 3/28/20 15:59 DC 3/26/20 22:39





 


 Morphine Sulfate


  (Morphine


 Sulfate)  2 mg  PRN Q2HR  PRN  3/16/20 05:00


 3/17/20 14:15 DC 3/17/20 12:26





 


 Multi-Ingred


 Cream/Lotion/Oil/


 Oint


  (Artificial


 Tears Eye


 Ointment)  1 thomas  PRN Q1HR  PRN  3/25/20 17:30


 6/3/20 14:39 DC 20 08:19





 


 Naloxone HCl


  (Narcan)  0.4 mg  PRN Q2MIN  PRN  20 14:30


   UNV  





 


 Norepinephrine


 Bitartrate 8 mg/


 Dextrose  258 ml @ 


 17.299 mls/


 hr  CONT  PRN  3/17/20 15:30


 20 09:19 DC 20 12:48





 


 Ondansetron HCl


  (Zofran)  4 mg  STK-MED ONCE  20 10:56


 20 10:57 DC  





 


 Pantoprazole


 Sodium


  (PROTONIX VIAL


 for IV PUSH)  40 mg  DAILYAC  3/16/20 11:30


    20 07:26





 


 Phenylephrine HCl


  (PHENYLEPHRINE


 in 0.9% NACL PF)  1 mg  STK-MED ONCE  20 12:34


 20 12:34 DC  





 


 Piperacillin Sod/


 Tazobactam Sod


 3.375 gm/Sodium


 Chloride  50 ml @ 


 100 mls/hr  Q6HRS  20 12:00


 20 07:26 DC 20 06:10





 


 Piperacillin Sod/


 Tazobactam Sod


 4.5 gm/Sodium


 Chloride  100 ml @ 


 200 mls/hr  1X  ONCE  3/16/20 06:00


 3/16/20 06:29 DC 3/16/20 05:44





 


 Potassium


 Chloride 110 meq/


 Magnesium Sulfate


 20 meq/


 Multivitamins 10


 ml/Chromium/


 Copper/Manganese/


 Seleni/Zn 1 ml/


 Insulin Human


 Regular 15 unit/


 Total Parenteral


 Nutrition/Amino


 Acids/Dextrose/


 Fat Emulsion


 Intravenous  1,800 ml @ 


 75 mls/hr  TPN  CONT  20 22:00


 20 21:59 DC 20 22:48





 


 Potassium


 Chloride 15 meq/


 Bicarbonate


 Dialysis Soln w/


 out KCl  5,007.5 ml


  @ 1,000 mls/


 hr  Q5H1M  3/29/20 20:00


 20 13:08 DC 20 18:14





 


 Potassium


 Chloride 20 meq/


 Bicarbonate


 Dialysis Soln w/


 out KCl  5,010 ml @ 


 1,000 mls/hr  Q5H1M  3/25/20 16:00


 3/29/20 19:59 DC 3/29/20 14:54





 


 Potassium


 Chloride 40 meq/


 Potassium Acetate


 60 meq/Magnesium


 Sulfate 10 meq/


 Multivitamins 10


 ml/Chromium/


 Copper/Manganese/


 Seleni/Zn 1 ml/


 Insulin Human


 Regular 20 unit/


 Total Parenteral


 Nutrition/Amino


 Acids/Dextrose/


 Fat Emulsion


 Intravenous  1,800 ml @ 


 75 mls/hr  TPN  CONT  20 22:00


 20 21:59 DC 20 00:03





 


 Potassium


 Chloride 70 meq/


 Magnesium Sulfate


 20 meq/


 Multivitamins 10


 ml/Chromium/


 Copper/Manganese/


 Seleni/Zn 1 ml/


 Insulin Human


 Regular 15 unit/


 Total Parenteral


 Nutrition/Amino


 Acids/Dextrose/


 Fat Emulsion


 Intravenous  1,800 ml @ 


 75 mls/hr  TPN  CONT  20 22:00


 20 21:59 DC 20 23:13





 


 Potassium


 Chloride 75 meq/


 Magnesium Sulfate


 15 meq/


 Multivitamins 10


 ml/Chromium/


 Copper/Manganese/


 Seleni/Zn 0.5 ml/


 Insulin Human


 Regular 15 unit/


 Total Parenteral


 Nutrition/Amino


 Acids/Dextrose/


 Fat Emulsion


 Intravenous  1,920 ml @ 


 80 mls/hr  TPN  CONT  20 22:00


 5/10/20 21:59 DC 20 22:41





 


 Potassium


 Chloride 75 meq/


 Magnesium Sulfate


 15 meq/Calcium


 Gluconate 8 meq/


 Multivitamins 10


 ml/Chromium/


 Copper/Manganese/


 Seleni/Zn 0.5 ml/


 Insulin Human


 Regular 15 unit/


 Total Parenteral


 Nutrition/Amino


 Acids/Dextrose/


 Fat Emulsion


 Intravenous  1,920 ml @ 


 80 mls/hr  TPN  CONT  20 22:00


 20 21:59 DC 20 22:28





 


 Potassium


 Chloride 75 meq/


 Magnesium Sulfate


 15 meq/Calcium


 Gluconate 8 meq/


 Multivitamins 10


 ml/Chromium/


 Copper/Manganese/


 Seleni/Zn 0.5 ml/


 Insulin Human


 Regular 20 unit/


 Total Parenteral


 Nutrition/Amino


 Acids/Dextrose/


 Fat Emulsion


 Intravenous  1,920 ml @ 


 80 mls/hr  TPN  CONT  20 22:00


 20 21:59 DC 20 22:00





 


 Potassium


 Chloride 75 meq/


 Magnesium Sulfate


 15 meq/Calcium


 Gluconate 8 meq/


 Multivitamins 10


 ml/Chromium/


 Copper/Manganese/


 Seleni/Zn 0.5 ml/


 Insulin Human


 Regular 25 unit/


 Total Parenteral


 Nutrition/Amino


 Acids/Dextrose/


 Fat Emulsion


 Intravenous  1,920 ml @ 


 80 mls/hr  TPN  CONT  20 22:00


 20 21:59 DC 20 23:08





 


 Potassium


 Chloride 75 meq/


 Magnesium Sulfate


 20 meq/Calcium


 Gluconate 10 meq/


 Multivitamins 10


 ml/Chromium/


 Copper/Manganese/


 Seleni/Zn 0.5 ml/


 Insulin Human


 Regular 25 unit/


 Total Parenteral


 Nutrition/Amino


 Acids/Dextrose/


 Fat Emulsion


 Intravenous  1,920 ml @ 


 80 mls/hr  TPN  CONT  5/3/20 22:00


 20 21:59 DC 5/3/20 22:04





 


 Potassium


 Chloride 75 meq/


 Magnesium Sulfate


 20 meq/Calcium


 Gluconate 10 meq/


 Multivitamins 10


 ml/Chromium/


 Copper/Manganese/


 Seleni/Zn 0.5 ml/


 Insulin Human


 Regular 30 unit/


 Total Parenteral


 Nutrition/Amino


 Acids/Dextrose/


 Fat Emulsion


 Intravenous  1,920 ml @ 


 80 mls/hr  TPN  CONT  20 22:00


 5/3/20 22:00 DC 20 21:51





 


 Potassium


 Chloride 80 meq/


 Magnesium Sulfate


 20 meq/


 Multivitamins 10


 ml/Chromium/


 Copper/Manganese/


 Seleni/Zn 0.5 ml/


 Insulin Human


 Regular 15 unit/


 Total Parenteral


 Nutrition/Amino


 Acids/Dextrose/


 Fat Emulsion


 Intravenous  1,920 ml @ 


 80 mls/hr  TPN  CONT  20 22:00


 20 21:59 DC 20 21:40





 


 Potassium


 Chloride 80 meq/


 Magnesium Sulfate


 20 meq/


 Multivitamins 10


 ml/Chromium/


 Copper/Manganese/


 Seleni/Zn 1 ml/


 Insulin Human


 Regular 15 unit/


 Total Parenteral


 Nutrition/Amino


 Acids/Dextrose/


 Fat Emulsion


 Intravenous  1,800 ml @ 


 75 mls/hr  TPN  CONT  20 22:00


 20 21:59 DC 20 21:54





 


 Potassium


 Chloride 90 meq/


 Magnesium Sulfate


 20 meq/


 Multivitamins 10


 ml/Chromium/


 Copper/Manganese/


 Seleni/Zn 1 ml/


 Insulin Human


 Regular 15 unit/


 Total Parenteral


 Nutrition/Amino


 Acids/Dextrose/


 Fat Emulsion


 Intravenous  1,800 ml @ 


 75 mls/hr  TPN  CONT  20 22:00


 20 21:59 DC 20 22:28





 


 Potassium


 Chloride 90 meq/


 Magnesium Sulfate


 20 meq/


 Multivitamins 10


 ml/Chromium/


 Copper/Manganese/


 Seleni/Zn 1 ml/


 Insulin Human


 Regular 20 unit/


 Total Parenteral


 Nutrition/Amino


 Acids/Dextrose/


 Fat Emulsion


 Intravenous  1,800 ml @ 


 75 mls/hr  TPN  CONT  6/3/20 22:00


 20 21:59 DC 6/3/20 23:13





 


 Potassium


 Chloride/Water  100 ml @ 


 100 mls/hr  1X  ONCE  20 10:00


 20 10:59 DC 20 10:15





 


 Potassium


 Phosphate 20 mmol/


 Sodium Chloride  106.6667


 ml @ 


 51.667 m...  1X  ONCE  3/25/20 13:00


 3/25/20 15:03 DC 3/25/20 12:51





 


 Potassium Acetate


 30 meq/Magnesium


 Sulfate 20 meq/


 Calcium Gluconate


 10 meq/


 Multivitamins 10


 ml/Chromium/


 Copper/Manganese/


 Seleni/Zn 0.5 ml/


 Insulin Human


 Regular 30 unit/


 Potassium


 Chloride 30 meq/


 Total Parenteral


 Nutrition/Amino


 Acids/Dextrose/


 Fat Emulsion


 Intravenous  1,920 ml @ 


 80 mls/hr  TPN  CONT  20 22:00


 20 21:59 DC 20 22:34





 


 Potassium Acetate


 55 meq/Magnesium


 Sulfate 20 meq/


 Calcium Gluconate


 10 meq/


 Multivitamins 10


 ml/Chromium/


 Copper/Manganese/


 Seleni/Zn 0.5 ml/


 Insulin Human


 Regular 30 unit/


 Total Parenteral


 Nutrition/Amino


 Acids/Dextrose/


 Fat Emulsion


 Intravenous  1,920 ml @ 


 80 mls/hr  TPN  CONT  20 22:00


 20 21:59 DC 20 01:00





 


 Potassium Acetate


 55 meq/Magnesium


 Sulfate 20 meq/


 Calcium Gluconate


 10 meq/


 Multivitamins 10


 ml/Chromium/


 Copper/Manganese/


 Seleni/Zn 0.5 ml/


 Insulin Human


 Regular 35 unit/


 Total Parenteral


 Nutrition/Amino


 Acids/Dextrose/


 Fat Emulsion


 Intravenous  1,920 ml @ 


 80 mls/hr  TPN  CONT  20 22:00


 20 21:59 DC 20 22:02





 


 Potassium Acetate


 60 meq/Magnesium


 Sulfate 5 meq/


 Multivitamins 10


 ml/Chromium/


 Copper/Manganese/


 Seleni/Zn 1 ml/


 Insulin Human


 Regular 30 unit/


 Total Parenteral


 Nutrition/Amino


 Acids/Dextrose/


 Fat Emulsion


 Intravenous  1,920 ml @ 


 80 mls/hr  TPN  CONT  20 22:00


 20 21:59   





 


 Potassium Acetate


 65 meq/Magnesium


 Sulfate 20 meq/


 Calcium Gluconate


 10 meq/


 Multivitamins 10


 ml/Chromium/


 Copper/Manganese/


 Seleni/Zn 0.5 ml/


 Insulin Human


 Regular 30 unit/


 Total Parenteral


 Nutrition/Amino


 Acids/Dextrose/


 Fat Emulsion


 Intravenous  1,920 ml @ 


 80 mls/hr  TPN  CONT  20 22:00


 20 21:59 DC 20 22:22





 


 Potassium Acetate


 80 meq/Magnesium


 Sulfate 5 meq/


 Multivitamins 10


 ml/Chromium/


 Copper/Manganese/


 Seleni/Zn 1 ml/


 Insulin Human


 Regular 20 unit/


 Total Parenteral


 Nutrition/Amino


 Acids/Dextrose/


 Fat Emulsion


 Intravenous  1,920 ml @ 


 80 mls/hr  TPN  CONT  20 22:00


 20 21:59  20 21:59





 


 Prochlorperazine


 Edisylate


  (Compazine)  5 mg  PACU PRN  PRN  20 07:00


 20 06:59 DC  





 


 Propofol  20 ml @ As


 Directed  STK-MED ONCE  20 12:26


 20 12:27 DC  





 


 Ringer's Solution  1,000 ml @ 


 30 mls/hr  Q24H  20 07:00


 20 18:59 DC  





 


 Rocuronium Bromide


  (Zemuron)  50 mg  STK-MED ONCE  20 10:56


 20 10:57 DC  





 


 Saliva Substitute


  (Biotene


 Moisturizing


 Mouth)  2 spray  PRN Q15MIN  PRN  20 11:00


     





 


 Sevoflurane


  (Ultane)  60 ml  STK-MED ONCE  20 12:26


 20 12:27 DC  





 


 Sodium


 Bicarbonate 50


 meq/Sodium


 Chloride  1,050 ml @ 


 75 mls/hr  Q14H  3/18/20 07:30


 3/23/20 10:28 DC 3/22/20 21:10





 


 Sodium Acetate 50


 meq/Potassium


 Acetate 55 meq/


 Magnesium Sulfate


 20 meq/Calcium


 Gluconate 10 meq/


 Multivitamins 10


 ml/Chromium/


 Copper/Manganese/


 Seleni/Zn 0.5 ml/


 Insulin Human


 Regular 35 unit/


 Total Parenteral


 Nutrition/Amino


 Acids/Dextrose/


 Fat Emulsion


 Intravenous  1,800 ml @ 


 75 mls/hr  TPN  CONT  20 22:00


 20 21:59 DC 20 22:03





 


 Sodium Chloride  1,000 ml @ 


 25 mls/hr  Q24H  20 14:30


   UNV  





 


 Sodium Chloride


  (Normal Saline


 Flush)  3 ml  QSHIFT  PRN  20 13:45


     





 


 Sodium Chloride


 90 meq/Calcium


 Gluconate 10 meq/


 Multivitamins 10


 ml/Chromium/


 Copper/Manganese/


 Seleni/Zn 0.5 ml/


 Total Parenteral


 Nutrition/Amino


 Acids/Dextrose/


 Fat Emulsion


 Intravenous  1,512 ml @ 


 63 mls/hr  TPN  CONT  3/18/20 22:00


 3/19/20 21:59 DC 3/18/20 22:06





 


 Sodium Chloride


 90 meq/Calcium


 Gluconate 10 meq/


 Multivitamins 10


 ml/Chromium/


 Copper/Manganese/


 Seleni/Zn 1 ml/


 Total Parenteral


 Nutrition/Amino


 Acids/Dextrose/


 Fat Emulsion


 Intravenous  55.005 ml


  @ 2.292


 mls/hr  TPN  CONT  3/18/20 22:00


 3/18/20 12:33 DC  





 


 Sodium Chloride


 90 meq/Magnesium


 Sulfate 10 meq/


 Calcium Gluconate


 20 meq/


 Multivitamins 10


 ml/Chromium/


 Copper/Manganese/


 Seleni/Zn 0.5 ml/


 Total Parenteral


 Nutrition/Amino


 Acids/Dextrose/


 Fat Emulsion


 Intravenous  1,512 ml @ 


 63 mls/hr  TPN  CONT  3/19/20 22:00


 3/20/20 21:59 DC 3/19/20 22:25





 


 Sodium Chloride


 90 meq/Magnesium


 Sulfate 12 meq/


 Calcium Gluconate


 15 meq/


 Multivitamins 10


 ml/Chromium/


 Copper/Manganese/


 Seleni/Zn 0.5 ml/


 Insulin Human


 Regular 25 unit/


 Total Parenteral


 Nutrition/Amino


 Acids/Dextrose/


 Fat Emulsion


 Intravenous  1,400 ml @ 


 58.333 mls/


 hr  TPN  CONT  20 22:00


 20 21:59 DC 20 21:41





 


 Sodium Chloride


 90 meq/Potassium


 Chloride 15 meq/


 Magnesium Sulfate


 12 meq/Calcium


 Gluconate 15 meq/


 Multivitamins 10


 ml/Chromium/


 Copper/Manganese/


 Seleni/Zn 0.5 ml/


 Insulin Human


 Regular 25 unit/


 Total Parenteral


 Nutrition/Amino


 Acids/Dextrose/


 Fat Emulsion


 Intravenous  1,400 ml @ 


 58.333 mls/


 hr  TPN  CONT  20 22:00


 20 21:59 DC 20 22:13





 


 Sodium Chloride


 90 meq/Potassium


 Chloride 15 meq/


 Potassium


 Phosphate 10 mmol/


 Magnesium Sulfate


 8 meq/Calcium


 Gluconate 15 meq/


 Multivitamins 10


 ml/Chromium/


 Copper/Manganese/


 Seleni/Zn 0.5 ml/


 Insulin Human


 Regular 25 unit/


 Total Parenteral


 Nutrition/Amino


 Acids/Dextrose/


 Fat Emulsion


 Intravenous  1,400 ml @ 


 58.333 mls/


 hr  TPN  CONT  20 22:00


 20 21:59 DC 20 21:20





 


 Sodium Chloride


 90 meq/Potassium


 Chloride 15 meq/


 Potassium


 Phosphate 10 mmol/


 Magnesium Sulfate


 10 meq/Calcium


 Gluconate 20 meq/


 Multivitamins 10


 ml/Chromium/


 Copper/Manganese/


 Seleni/Zn 0.5 ml/


 Total Parenteral


 Nutrition/Amino


 Acids/Dextrose/


 Fat Emulsion


 Intravenous  1,400 ml @ 


 58.333 mls/


 hr  TPN  CONT  3/23/20 22:00


 3/24/20 21:59 DC 3/23/20 21:42





 


 Sodium Chloride


 90 meq/Potassium


 Chloride 15 meq/


 Potassium


 Phosphate 10 mmol/


 Magnesium Sulfate


 12 meq/Calcium


 Gluconate 15 meq/


 Multivitamins 10


 ml/Chromium/


 Copper/Manganese/


 Seleni/Zn 0.5 ml/


 Insulin Human


 Regular 25 unit/


 Total Parenteral


 Nutrition/Amino


 Acids/Dextrose/


 Fat Emulsion


 Intravenous  1,400 ml @ 


 58.333 mls/


 hr  TPN  CONT  20 22:00


 20 21:59 DC 20 22:24





 


 Sodium Chloride


 90 meq/Potassium


 Chloride 15 meq/


 Potassium


 Phosphate 15 mmol/


 Magnesium Sulfate


 10 meq/Calcium


 Gluconate 15 meq/


 Multivitamins 10


 ml/Chromium/


 Copper/Manganese/


 Seleni/Zn 0.5 ml/


 Total Parenteral


 Nutrition/Amino


 Acids/Dextrose/


 Fat Emulsion


 Intravenous  1,400 ml @ 


 58.333 mls/


 hr  TPN  CONT  3/24/20 22:00


 3/25/20 21:59 DC 3/24/20 22:17





 


 Sodium Chloride


 90 meq/Potassium


 Chloride 15 meq/


 Potassium


 Phosphate 15 mmol/


 Magnesium Sulfate


 10 meq/Calcium


 Gluconate 20 meq/


 Multivitamins 10


 ml/Chromium/


 Copper/Manganese/


 Seleni/Zn 0.5 ml/


 Total Parenteral


 Nutrition/Amino


 Acids/Dextrose/


 Fat Emulsion


 Intravenous  1,200 ml @ 


 50 mls/hr  TPN  CONT  3/22/20 22:00


 3/22/20 14:17 DC  





 


 Sodium Chloride


 90 meq/Potassium


 Chloride 15 meq/


 Potassium


 Phosphate 18 mmol/


 Magnesium Sulfate


 8 meq/Calcium


 Gluconate 15 meq/


 Multivitamins 10


 ml/Chromium/


 Copper/Manganese/


 Seleni/Zn 0.5 ml/


 Insulin Human


 Regular 10 unit/


 Total Parenteral


 Nutrition/Amino


 Acids/Dextrose/


 Fat Emulsion


 Intravenous  1,400 ml @ 


 58.333 mls/


 hr  TPN  CONT  3/27/20 22:00


 3/28/20 21:59 DC 3/27/20 21:43





 


 Sodium Chloride


 90 meq/Potassium


 Chloride 15 meq/


 Potassium


 Phosphate 18 mmol/


 Magnesium Sulfate


 8 meq/Calcium


 Gluconate 15 meq/


 Multivitamins 10


 ml/Chromium/


 Copper/Manganese/


 Seleni/Zn 0.5 ml/


 Insulin Human


 Regular 15 unit/


 Total Parenteral


 Nutrition/Amino


 Acids/Dextrose/


 Fat Emulsion


 Intravenous  1,400 ml @ 


 58.333 mls/


 hr  TPN  CONT  3/30/20 22:00


 3/31/20 21:59 DC 3/30/20 21:47





 


 Sodium Chloride


 90 meq/Potassium


 Chloride 15 meq/


 Potassium


 Phosphate 18 mmol/


 Magnesium Sulfate


 8 meq/Calcium


 Gluconate 15 meq/


 Multivitamins 10


 ml/Chromium/


 Copper/Manganese/


 Seleni/Zn 0.5 ml/


 Insulin Human


 Regular 20 unit/


 Total Parenteral


 Nutrition/Amino


 Acids/Dextrose/


 Fat Emulsion


 Intravenous  1,400 ml @ 


 58.333 mls/


 hr  TPN  CONT  20 22:00


 4/3/20 21:59 DC 20 22:45





 


 Sodium Chloride


 90 meq/Potassium


 Chloride 15 meq/


 Potassium


 Phosphate 18 mmol/


 Magnesium Sulfate


 8 meq/Calcium


 Gluconate 15 meq/


 Multivitamins 10


 ml/Chromium/


 Copper/Manganese/


 Seleni/Zn 0.5 ml/


 Total Parenteral


 Nutrition/Amino


 Acids/Dextrose/


 Fat Emulsion


 Intravenous  1,400 ml @ 


 58.333 mls/


 hr  TPN  CONT  3/26/20 22:00


 3/27/20 21:59 DC 3/26/20 22:00





 


 Sodium Chloride


 90 meq/Potassium


 Phosphate 15 mmol/


 Magnesium Sulfate


 12 meq/Calcium


 Gluconate 15 meq/


 Multivitamins 10


 ml/Chromium/


 Copper/Manganese/


 Seleni/Zn 0.5 ml/


 Insulin Human


 Regular 30 unit/


 Total Parenteral


 Nutrition/Amino


 Acids/Dextrose/


 Fat Emulsion


 Intravenous  1,400 ml @ 


 58.333 mls/


 hr  TPN  CONT  4/10/20 22:00


 20 21:59 DC 4/10/20 21:49





 


 Sodium Chloride


 90 meq/Potassium


 Phosphate 15 mmol/


 Magnesium Sulfate


 12 meq/Calcium


 Gluconate 15 meq/


 Multivitamins 10


 ml/Chromium/


 Copper/Manganese/


 Seleni/Zn 0.5 ml/


 Insulin Human


 Regular 40 unit/


 Total Parenteral


 Nutrition/Amino


 Acids/Dextrose/


 Fat Emulsion


 Intravenous  1,400 ml @ 


 58.333 mls/


 hr  TPN  CONT  20 22:00


 20 21:59 DC 20 21:21





 


 Sodium Chloride


 90 meq/Potassium


 Phosphate 19 mmol/


 Magnesium Sulfate


 12 meq/Calcium


 Gluconate 15 meq/


 Multivitamins 10


 ml/Chromium/


 Copper/Manganese/


 Seleni/Zn 0.5 ml/


 Insulin Human


 Regular 40 unit/


 Total Parenteral


 Nutrition/Amino


 Acids/Dextrose/


 Fat Emulsion


 Intravenous  1,400 ml @ 


 58.333 mls/


 hr  TPN  CONT  20 22:00


 20 21:59 DC 20 21:54





 


 Sodium Chloride


 90 meq/Potassium


 Phosphate 5 mmol/


 Magnesium Sulfate


 12 meq/Calcium


 Gluconate 15 meq/


 Multivitamins 10


 ml/Chromium/


 Copper/Manganese/


 Seleni/Zn 0.5 ml/


 Insulin Human


 Regular 30 unit/


 Total Parenteral


 Nutrition/Amino


 Acids/Dextrose/


 Fat Emulsion


 Intravenous  1,400 ml @ 


 58.333 mls/


 hr  TPN  CONT  20 22:00


 4/10/20 21:59 DC 20 22:08





 


 Sodium Chloride


 100 meq/Potassium


 Chloride 40 meq/


 Magnesium Sulfate


 15 meq/Calcium


 Gluconate 15 meq/


 Multivitamins 10


 ml/Chromium/


 Copper/Manganese/


 Seleni/Zn 0.5 ml/


 Insulin Human


 Regular 35 unit/


 Total Parenteral


 Nutrition/Amino


 Acids/Dextrose/


 Fat Emulsion


 Intravenous  1,400 ml @ 


 58.333 mls/


 hr  TPN  CONT  20 22:00


 20 21:59 DC 20 22:46





 


 Sodium Chloride


 100 meq/Potassium


 Chloride 40 meq/


 Magnesium Sulfate


 20 meq/Calcium


 Gluconate 10 meq/


 Multivitamins 10


 ml/Chromium/


 Copper/Manganese/


 Seleni/Zn 0.5 ml/


 Insulin Human


 Regular 35 unit/


 Total Parenteral


 Nutrition/Amino


 Acids/Dextrose/


 Fat Emulsion


 Intravenous  1,400 ml @ 


 58.333 mls/


 hr  TPN  CONT  20 22:00


 20 21:59 DC 20 00:06





 


 Sodium Chloride


 100 meq/Potassium


 Chloride 40 meq/


 Magnesium Sulfate


 20 meq/Calcium


 Gluconate 15 meq/


 Multivitamins 10


 ml/Chromium/


 Copper/Manganese/


 Seleni/Zn 0.5 ml/


 Insulin Human


 Regular 35 unit/


 Total Parenteral


 Nutrition/Amino


 Acids/Dextrose/


 Fat Emulsion


 Intravenous  1,400 ml @ 


 58.333 mls/


 hr  TPN  CONT  20 22:00


 20 21:59 DC 20 22:27





 


 Sodium Chloride


 100 meq/Potassium


 Phosphate 10 mmol/


 Magnesium Sulfate


 12 meq/Calcium


 Gluconate 15 meq/


 Multivitamins 10


 ml/Chromium/


 Copper/Manganese/


 Seleni/Zn 0.5 ml/


 Insulin Human


 Regular 35 unit/


 Potassium


 Chloride 20 meq/


 Total Parenteral


 Nutrition/Amino


 Acids/Dextrose/


 Fat Emulsion


 Intravenous  1,400 ml @ 


 58.333 mls/


 hr  TPN  CONT  20 22:00


 20 21:59 DC 20 22:10





 


 Sodium Chloride


 100 meq/Potassium


 Phosphate 19 mmol/


 Magnesium Sulfate


 12 meq/Calcium


 Gluconate 15 meq/


 Multivitamins 10


 ml/Chromium/


 Copper/Manganese/


 Seleni/Zn 0.5 ml/


 Insulin Human


 Regular 40 unit/


 Potassium


 Chloride 20 meq/


 Total Parenteral


 Nutrition/Amino


 Acids/Dextrose/


 Fat Emulsion


 Intravenous  1,400 ml @ 


 58.333 mls/


 hr  TPN  CONT  4/15/20 22:00


 20 21:59 DC 4/15/20 21:20





 


 Sodium Chloride


 100 meq/Potassium


 Phosphate 5 mmol/


 Magnesium Sulfate


 12 meq/Calcium


 Gluconate 15 meq/


 Multivitamins 10


 ml/Chromium/


 Copper/Manganese/


 Seleni/Zn 0.5 ml/


 Insulin Human


 Regular 35 unit/


 Potassium


 Chloride 20 meq/


 Total Parenteral


 Nutrition/Amino


 Acids/Dextrose/


 Fat Emulsion


 Intravenous  1,400 ml @ 


 58.333 mls/


 hr  TPN  CONT  20 22:00


 20 21:59 DC 20 22:59





 


 Succinylcholine


 Chloride


  (Anectine)  120 mg  1X  ONCE  3/23/20 08:30


 3/23/20 08:31 DC 3/23/20 08:34





 


 Vecuronium Bromide


  (Norcuron Bolus)  6 mg  PRN Q6HRS  PRN  20 19:15


 20 19:35 DC  











Current Medications








 Medications


  (Trade)  Dose


 Ordered  Sig/Yee


 Route


 PRN Reason  Start Time


 Stop Time Status Last Admin


Dose Admin


 


 Potassium Acetate


 80 meq/Magnesium


 Sulfate 5 meq/


 Multivitamins 10


 ml/Chromium/


 Copper/Manganese/


 Seleni/Zn 1 ml/


 Insulin Human


 Regular 20 unit/


 Total Parenteral


 Nutrition/Amino


 Acids/Dextrose/


 Fat Emulsion


 Intravenous  1,920 ml @ 


 80 mls/hr  TPN  CONT


 IV


   20 22:00


 20 21:59  20 21:59














LAB


Lab:





                                Laboratory Tests








Test


 20


12:26 20


16:00 20


17:45 20


23:35


 


Glucose (Fingerstick)


 220 mg/dL


(70-99)  H 


 209 mg/dL


(70-99)  H 178 mg/dL


(70-99)  H


 


White Blood Count


 


 32.2 x10^3/uL


(4.0-11.0)  H 


 





 


Red Blood Count


 


 2.97 x10^6/uL


(3.50-5.40)  L 


 





 


Hemoglobin


 


 8.4 g/dL


(12.0-15.5)  L 


 





 


Hematocrit


 


 28.3 %


(36.0-47.0)  L 


 





 


Mean Corpuscular Volume


 


 95 fL ()


# 


 





 


Mean Corpuscular Hemoglobin  28 pg (25-35)    


 


Mean Corpuscular Hemoglobin


Concent 


 30 g/dL


(31-37)  L 


 





 


Red Cell Distribution Width


 


 20.8 %


(11.5-14.5)  H 


 





 


Platelet Count


 


 787 x10^3/uL


(140-400)  H 


 





 


Sodium Level


 


 151 mmol/L


(136-145)  H 


 





 


Potassium Level


 


 4.6 mmol/L


(3.5-5.1) 


 





 


Chloride Level


 


 113 mmol/L


()  H 


 





 


Carbon Dioxide Level


 


 19 mmol/L


(21-32)  L 


 





 


Anion Gap  19 (6-14)  H  


 


Blood Urea Nitrogen


 


 58 mg/dL


(7-20)  H 


 





 


Creatinine


 


 1.8 mg/dL


(0.6-1.0)  H 


 





 


Estimated GFR


(Cockcroft-Gault) 


 29.9  


 


 





 


BUN/Creatinine Ratio  32 (6-20)  H  


 


Glucose Level


 


 241 mg/dL


(70-99)  H 


 





 


Calcium Level


 


 11.0 mg/dL


(8.5-10.1)  H 


 





 


Total Bilirubin


 


 0.5 mg/dL


(0.2-1.0) 


 





 


Aspartate Amino Transferase


(AST) 


 31 U/L (15-37)


 


 





 


Alanine Aminotransferase (ALT)


 


 23 U/L (14-59)


 


 





 


Alkaline Phosphatase


 


 131 U/L


()  H 


 





 


Total Protein


 


 7.0 g/dL


(6.4-8.2) 


 





 


Albumin


 


 1.9 g/dL


(3.4-5.0)  L 


 





 


Albumin/Globulin Ratio


 


 0.4 (1.0-1.7)


L 


 





 


Prothrombin Time


 


 


 15.3 SEC


(11.7-14.0)  H 





 


Prothrombin Time INR


 


 


 1.3 (0.8-1.1)


H 





 


Lactic Acid Level


 


 


 1.9 mmol/L


(0.4-2.0) 





 


Test


 20


05:53 20


05:55 20


06:45 





 


Glucose (Fingerstick)


 216 mg/dL


(70-99)  H 


 


 





 


Sodium Level


 


 147 mmol/L


(136-145)  H 


 





 


Potassium Level


 


 5.1 mmol/L


(3.5-5.1) 


 





 


Chloride Level


 


 113 mmol/L


()  H 


 





 


Carbon Dioxide Level


 


 26 mmol/L


(21-32) 


 





 


Anion Gap  8 (6-14)    


 


Blood Urea Nitrogen


 


 69 mg/dL


(7-20)  H 


 





 


Creatinine


 


 1.9 mg/dL


(0.6-1.0)  H 


 





 


Estimated GFR


(Cockcroft-Gault) 


 28.1  


 


 





 


Glucose Level


 


 240 mg/dL


(70-99)  H 


 





 


Calcium Level


 


 10.4 mg/dL


(8.5-10.1)  H 


 





 


Phosphorus Level


 


 4.0 mg/dL


(2.6-4.7) 


 





 


Magnesium Level


 


 2.4 mg/dL


(1.8-2.4) 


 





 


Amylase Level


 


 385 U/L


()  H 


 





 


Lipase


 


 563 U/L


()  H 


 





 


White Blood Count


 


 


 21.2 x10^3/uL


(4.0-11.0)  H 





 


Red Blood Count


 


 


 2.44 x10^6/uL


(3.50-5.40)  L 





 


Hemoglobin


 


 


 7.0 g/dL


(12.0-15.5)  *L 





 


Hematocrit


 


 


 22.2 %


(36.0-47.0)  L 





 


Mean Corpuscular Volume


 


 


 91 fL ()


 





 


Mean Corpuscular Hemoglobin   29 pg (25-35)   


 


Mean Corpuscular Hemoglobin


Concent 


 


 31 g/dL


(31-37) 





 


Red Cell Distribution Width


 


 


 21.0 %


(11.5-14.5)  H 





 


Platelet Count


 


 


 570 x10^3/uL


(140-400)  H 





 


Neutrophils (%) (Auto)   85 % (31-73)  H 


 


Lymphocytes (%) (Auto)   11 % (24-48)  L 


 


Monocytes (%) (Auto)   4 % (0-9)   


 


Eosinophils (%) (Auto)   0 % (0-3)   


 


Basophils (%) (Auto)   0 % (0-3)   


 


Neutrophils # (Auto)


 


 


 18.0 x10^3/uL


(1.8-7.7)  H 





 


Lymphocytes # (Auto)


 


 


 2.2 x10^3/uL


(1.0-4.8) 





 


Monocytes # (Auto)


 


 


 0.9 x10^3/uL


(0.0-1.1) 





 


Eosinophils # (Auto)


 


 


 0.1 x10^3/uL


(0.0-0.7) 





 


Basophils # (Auto)


 


 


 0.0 x10^3/uL


(0.0-0.2) 








                                Laboratory Tests


20 16:00








20 06:45








                                Laboratory Tests


20 16:00








20 05:55











IMAGING


Imaging:


PATIENT: JESENIA BEAN  ACCOUNT: [id number]     MRN#: [id number]


: 1970           LOCATION: [address] [place] [address]      AGE: 49


SEX: F                    EXAM DT: 20         ACCESSION#: [id number]


STATUS: ADM IN            ORD. PHYSICIAN: SHARYN SOLORZANO MD


REASON: possible aspiration


PROCEDURE: CHEST AP ONLY





AP chest x-ray


 


COMPARISON: Chest x-ray May 30, 2020.


 


HISTORY: Aspiration.


 


FINDINGS: Tracheostomy. Left PICC line tip distal SVC. The right PICC line


on the prior exam has been removed. Nasogastric tube extends to the 


abdomen outside the field-of-view. Heart size normal. No pneumothorax. 


Mild left greater than right pleural effusions again demonstrated along 


with asymmetric opacity at the left lower lobe. Mild interstitial linear 


densities likely representing mild edema at the lung bases also present. 


Bones are unremarkable.


 


IMPRESSION: Lines and tubes as described above. Left greater than right 


lower lobe opacities most likely pulmonary edema and left greater than 


right mild pleural effusions are stable. Superimposed left lower lobe 


pneumonia is not excluded.


 


Electronically signed by: Farhat Wallace MD (2020 8:25 AM) [id number]





ASSESSMENT & PLAN


A&P


Plan as noted above 








Dictation was created using [employer] and there may be inconsistencies 

and errors due to the nature of real-time dictation





Hemodynamically unstable?:  No


Is patient in severe pain?:  No


Is NPO status required?:  No





Problem Qualifiers





(1) Sepsis:  


Sepsis type:  sepsis due to unspecified organism  Sepsis acute organ dysfunction

status:  with acute organ dysfunction  Severe sepsis acute organ dysfunction 

type:  unspecified  Severe sepsis shock status:  unspecified  Qualified Codes:  

A41.9 - Sepsis, unspecified organism; R65.20 - Severe sepsis without septic 

shock








NAZARIO COLES MD                 2020 11:35

## 2020-06-06 NOTE — NUR
Shift report-

Morning assessment, pt very lethargic, will respond to yes/no commands. Lg amount of brown, 
old blood out of right drain site, approx 150ml. Kristopher at bedside view dressing and drain 
site with nurse-

Orders per Eyad to given 500ml Albumin, hgb 7.0 1uPRBC given, recheck 7.2-another 1uPRBC 
given recheck 9.1 



1430 Fentanyl patch removed-Narcan given, pt woke up and started vomiting lg amount of green 
bile, suctioned-placed on trach shield-blood gas called to Dr Castano-see results. 

Pt anxious, hands shaking post vomit. Attempted to calm down, low grade fever, BP SBP low 
100's, zofran given. 



1700 Rolf at bedside, pt still lethargic but will wake to name. Narcan given, no change in 
mentation. Dressing changed, saturated with lg amount of brown old blood. Pt has low grade 
temp 100.4, apllied fan and cool wash cloth- Rolf at bedside. All questions answered, Rolf 
agrees with plan of care.

## 2020-06-07 VITALS — SYSTOLIC BLOOD PRESSURE: 109 MMHG | DIASTOLIC BLOOD PRESSURE: 56 MMHG

## 2020-06-07 VITALS — DIASTOLIC BLOOD PRESSURE: 56 MMHG | SYSTOLIC BLOOD PRESSURE: 108 MMHG

## 2020-06-07 VITALS — SYSTOLIC BLOOD PRESSURE: 98 MMHG | DIASTOLIC BLOOD PRESSURE: 46 MMHG

## 2020-06-07 VITALS — SYSTOLIC BLOOD PRESSURE: 98 MMHG | DIASTOLIC BLOOD PRESSURE: 54 MMHG

## 2020-06-07 VITALS — DIASTOLIC BLOOD PRESSURE: 52 MMHG | SYSTOLIC BLOOD PRESSURE: 113 MMHG

## 2020-06-07 VITALS — SYSTOLIC BLOOD PRESSURE: 108 MMHG | DIASTOLIC BLOOD PRESSURE: 53 MMHG

## 2020-06-07 VITALS — SYSTOLIC BLOOD PRESSURE: 90 MMHG | DIASTOLIC BLOOD PRESSURE: 54 MMHG

## 2020-06-07 VITALS — SYSTOLIC BLOOD PRESSURE: 140 MMHG | DIASTOLIC BLOOD PRESSURE: 74 MMHG

## 2020-06-07 VITALS — SYSTOLIC BLOOD PRESSURE: 102 MMHG | DIASTOLIC BLOOD PRESSURE: 53 MMHG

## 2020-06-07 VITALS — SYSTOLIC BLOOD PRESSURE: 80 MMHG | DIASTOLIC BLOOD PRESSURE: 53 MMHG

## 2020-06-07 VITALS — DIASTOLIC BLOOD PRESSURE: 52 MMHG | SYSTOLIC BLOOD PRESSURE: 98 MMHG

## 2020-06-07 VITALS — SYSTOLIC BLOOD PRESSURE: 106 MMHG | DIASTOLIC BLOOD PRESSURE: 51 MMHG

## 2020-06-07 VITALS — SYSTOLIC BLOOD PRESSURE: 103 MMHG | DIASTOLIC BLOOD PRESSURE: 60 MMHG

## 2020-06-07 VITALS — SYSTOLIC BLOOD PRESSURE: 110 MMHG | DIASTOLIC BLOOD PRESSURE: 53 MMHG

## 2020-06-07 VITALS — SYSTOLIC BLOOD PRESSURE: 107 MMHG | DIASTOLIC BLOOD PRESSURE: 52 MMHG

## 2020-06-07 VITALS — DIASTOLIC BLOOD PRESSURE: 68 MMHG | SYSTOLIC BLOOD PRESSURE: 102 MMHG

## 2020-06-07 VITALS — DIASTOLIC BLOOD PRESSURE: 56 MMHG | SYSTOLIC BLOOD PRESSURE: 98 MMHG

## 2020-06-07 VITALS — DIASTOLIC BLOOD PRESSURE: 51 MMHG | SYSTOLIC BLOOD PRESSURE: 103 MMHG

## 2020-06-07 VITALS — DIASTOLIC BLOOD PRESSURE: 54 MMHG | SYSTOLIC BLOOD PRESSURE: 108 MMHG

## 2020-06-07 VITALS — SYSTOLIC BLOOD PRESSURE: 81 MMHG | DIASTOLIC BLOOD PRESSURE: 55 MMHG

## 2020-06-07 VITALS — SYSTOLIC BLOOD PRESSURE: 95 MMHG | DIASTOLIC BLOOD PRESSURE: 60 MMHG

## 2020-06-07 VITALS — DIASTOLIC BLOOD PRESSURE: 50 MMHG | SYSTOLIC BLOOD PRESSURE: 102 MMHG

## 2020-06-07 VITALS — SYSTOLIC BLOOD PRESSURE: 106 MMHG | DIASTOLIC BLOOD PRESSURE: 72 MMHG

## 2020-06-07 VITALS — SYSTOLIC BLOOD PRESSURE: 105 MMHG | DIASTOLIC BLOOD PRESSURE: 58 MMHG

## 2020-06-07 VITALS — DIASTOLIC BLOOD PRESSURE: 42 MMHG | SYSTOLIC BLOOD PRESSURE: 88 MMHG

## 2020-06-07 VITALS — DIASTOLIC BLOOD PRESSURE: 46 MMHG | SYSTOLIC BLOOD PRESSURE: 98 MMHG

## 2020-06-07 VITALS — DIASTOLIC BLOOD PRESSURE: 50 MMHG | SYSTOLIC BLOOD PRESSURE: 106 MMHG

## 2020-06-07 VITALS — DIASTOLIC BLOOD PRESSURE: 62 MMHG | SYSTOLIC BLOOD PRESSURE: 119 MMHG

## 2020-06-07 VITALS — SYSTOLIC BLOOD PRESSURE: 106 MMHG | DIASTOLIC BLOOD PRESSURE: 54 MMHG

## 2020-06-07 VITALS — DIASTOLIC BLOOD PRESSURE: 52 MMHG | SYSTOLIC BLOOD PRESSURE: 112 MMHG

## 2020-06-07 VITALS — DIASTOLIC BLOOD PRESSURE: 69 MMHG | SYSTOLIC BLOOD PRESSURE: 106 MMHG

## 2020-06-07 VITALS — DIASTOLIC BLOOD PRESSURE: 46 MMHG | SYSTOLIC BLOOD PRESSURE: 101 MMHG

## 2020-06-07 VITALS — DIASTOLIC BLOOD PRESSURE: 54 MMHG | SYSTOLIC BLOOD PRESSURE: 103 MMHG

## 2020-06-07 VITALS — DIASTOLIC BLOOD PRESSURE: 49 MMHG | SYSTOLIC BLOOD PRESSURE: 104 MMHG

## 2020-06-07 VITALS — DIASTOLIC BLOOD PRESSURE: 61 MMHG | SYSTOLIC BLOOD PRESSURE: 96 MMHG

## 2020-06-07 VITALS — SYSTOLIC BLOOD PRESSURE: 102 MMHG | DIASTOLIC BLOOD PRESSURE: 61 MMHG

## 2020-06-07 VITALS — DIASTOLIC BLOOD PRESSURE: 77 MMHG | SYSTOLIC BLOOD PRESSURE: 152 MMHG

## 2020-06-07 VITALS — SYSTOLIC BLOOD PRESSURE: 89 MMHG | DIASTOLIC BLOOD PRESSURE: 57 MMHG

## 2020-06-07 VITALS — DIASTOLIC BLOOD PRESSURE: 43 MMHG | SYSTOLIC BLOOD PRESSURE: 110 MMHG

## 2020-06-07 VITALS — SYSTOLIC BLOOD PRESSURE: 104 MMHG | DIASTOLIC BLOOD PRESSURE: 55 MMHG

## 2020-06-07 VITALS — SYSTOLIC BLOOD PRESSURE: 95 MMHG | DIASTOLIC BLOOD PRESSURE: 41 MMHG

## 2020-06-07 VITALS — DIASTOLIC BLOOD PRESSURE: 72 MMHG | SYSTOLIC BLOOD PRESSURE: 116 MMHG

## 2020-06-07 VITALS — DIASTOLIC BLOOD PRESSURE: 56 MMHG | SYSTOLIC BLOOD PRESSURE: 101 MMHG

## 2020-06-07 VITALS — SYSTOLIC BLOOD PRESSURE: 105 MMHG | DIASTOLIC BLOOD PRESSURE: 51 MMHG

## 2020-06-07 VITALS — SYSTOLIC BLOOD PRESSURE: 106 MMHG | DIASTOLIC BLOOD PRESSURE: 50 MMHG

## 2020-06-07 VITALS — SYSTOLIC BLOOD PRESSURE: 91 MMHG | DIASTOLIC BLOOD PRESSURE: 66 MMHG

## 2020-06-07 VITALS — DIASTOLIC BLOOD PRESSURE: 66 MMHG | SYSTOLIC BLOOD PRESSURE: 113 MMHG

## 2020-06-07 VITALS — SYSTOLIC BLOOD PRESSURE: 81 MMHG | DIASTOLIC BLOOD PRESSURE: 41 MMHG

## 2020-06-07 LAB
ALBUMIN SERPL-MCNC: 1.8 G/DL (ref 3.4–5)
ALBUMIN/GLOB SERPL: 0.4 {RATIO} (ref 1–1.7)
ALP SERPL-CCNC: 104 U/L (ref 46–116)
ALT SERPL-CCNC: 20 U/L (ref 14–59)
AMORPH SED URNS QL MICRO: PRESENT /HPF
ANION GAP SERPL CALC-SCNC: 9 MMOL/L (ref 6–14)
APTT BLD: 32 SEC (ref 24–38)
APTT PPP: YELLOW S
AST SERPL-CCNC: 27 U/L (ref 15–37)
BACTERIA #/AREA URNS HPF: (no result) /HPF
BASE EXCESS ABG: -6 MMOL/L (ref -3–3)
BASE EXCESS ABG: -7 MMOL/L (ref -3–3)
BASOPHILS # BLD AUTO: 0 X10^3/UL (ref 0–0.2)
BASOPHILS NFR BLD: 0 % (ref 0–3)
BILIRUB SERPL-MCNC: 0.6 MG/DL (ref 0.2–1)
BILIRUB UR QL STRIP: NEGATIVE
BUN SERPL-MCNC: 74 MG/DL (ref 7–20)
BUN/CREAT SERPL: 39 (ref 6–20)
CALCIUM SERPL-MCNC: 10.8 MG/DL (ref 8.5–10.1)
CHLORIDE SERPL-SCNC: 113 MMOL/L (ref 98–107)
CO2 SERPL-SCNC: 25 MMOL/L (ref 21–32)
CREAT SERPL-MCNC: 1.9 MG/DL (ref 0.6–1)
EOSINOPHIL NFR BLD: 0 % (ref 0–3)
EOSINOPHIL NFR BLD: 0 X10^3/UL (ref 0–0.7)
ERYTHROCYTE [DISTWIDTH] IN BLOOD BY AUTOMATED COUNT: 17 % (ref 11.5–14.5)
ERYTHROCYTE [DISTWIDTH] IN BLOOD BY AUTOMATED COUNT: 17.3 % (ref 11.5–14.5)
ERYTHROCYTE [DISTWIDTH] IN BLOOD BY AUTOMATED COUNT: 18.5 % (ref 11.5–14.5)
FIBRINOGEN PPP-MCNC: (no result) MG/DL
GFR SERPLBLD BASED ON 1.73 SQ M-ARVRAT: 28.1 ML/MIN
GLOBULIN SER-MCNC: 4.5 G/DL (ref 2.2–3.8)
GLUCOSE SERPL-MCNC: 241 MG/DL (ref 70–99)
GRAN CASTS #/AREA URNS LPF: (no result) /HPF
HCO3 BLDA-SCNC: 20 MMOL/L (ref 21–28)
HCO3 BLDA-SCNC: 21 MMOL/L (ref 21–28)
HCT VFR BLD CALC: 22.6 % (ref 36–47)
HCT VFR BLD CALC: 26 % (ref 36–47)
HCT VFR BLD CALC: 27.3 % (ref 36–47)
HCT VFR BLD CALC: 28.9 % (ref 36–47)
HGB BLD-MCNC: 7.4 G/DL (ref 12–15.5)
HGB BLD-MCNC: 8.3 G/DL (ref 12–15.5)
HGB BLD-MCNC: 8.7 G/DL (ref 12–15.5)
HGB BLD-MCNC: 9.6 G/DL (ref 12–15.5)
HYALINE CASTS #/AREA URNS LPF: (no result) /HPF
INSPIRATION SETTING TIME VENT: 40
INSPIRATION SETTING TIME VENT: 50
LYMPHOCYTES # BLD: 1.7 X10^3/UL (ref 1–4.8)
LYMPHOCYTES NFR BLD AUTO: 8 % (ref 24–48)
MCH RBC QN AUTO: 29 PG (ref 25–35)
MCH RBC QN AUTO: 30 PG (ref 25–35)
MCH RBC QN AUTO: 30 PG (ref 25–35)
MCHC RBC AUTO-ENTMCNC: 32 G/DL (ref 31–37)
MCHC RBC AUTO-ENTMCNC: 32 G/DL (ref 31–37)
MCHC RBC AUTO-ENTMCNC: 33 G/DL (ref 31–37)
MCHC RBC AUTO-ENTMCNC: 33 G/DL (ref 31–37)
MCV RBC AUTO: 89 FL (ref 79–100)
MCV RBC AUTO: 89 FL (ref 79–100)
MCV RBC AUTO: 90 FL (ref 79–100)
MONO #: 0.6 X10^3/UL (ref 0–1.1)
MONOCYTES NFR BLD: 3 % (ref 0–9)
NEUT #: 17.6 X10^3/UL (ref 1.8–7.7)
NEUTROPHILS NFR BLD AUTO: 88 % (ref 31–73)
NITRITE UR QL STRIP: NEGATIVE
PCO2 BLDA: 44 MMHG (ref 35–46)
PCO2 BLDA: 48 MMHG (ref 35–46)
PH UR STRIP: 5 [PH]
PLATELET # BLD AUTO: 310 X10^3/UL (ref 140–400)
PLATELET # BLD AUTO: 433 X10^3/UL (ref 140–400)
PLATELET # BLD AUTO: 451 X10^3/UL (ref 140–400)
PO2 BLDA: 108 MMHG (ref 75–108)
PO2 BLDA: 92 MMHG (ref 75–108)
POTASSIUM SERPL-SCNC: 5.3 MMOL/L (ref 3.5–5.1)
PROT SERPL-MCNC: 6.3 G/DL (ref 6.4–8.2)
PROT UR STRIP-MCNC: 30 MG/DL
PROTHROMBIN TIME: 16 SEC (ref 11.7–14)
RBC # BLD AUTO: 2.51 X10^6/UL (ref 3.5–5.4)
RBC # BLD AUTO: 3.06 X10^6/UL (ref 3.5–5.4)
RBC # BLD AUTO: 3.23 X10^6/UL (ref 3.5–5.4)
RBC #/AREA URNS HPF: 0 /HPF (ref 0–2)
SAO2 % BLDA: 95 % (ref 92–99)
SAO2 % BLDA: 97 % (ref 92–99)
SODIUM SERPL-SCNC: 147 MMOL/L (ref 136–145)
SQUAMOUS #/AREA URNS LPF: (no result) /LPF
UROBILINOGEN UR-MCNC: 0.2 MG/DL
WBC # BLD AUTO: 20 X10^3/UL (ref 4–11)
WBC # BLD AUTO: 20.9 X10^3/UL (ref 4–11)
WBC # BLD AUTO: 21.2 X10^3/UL (ref 4–11)
YEAST #/AREA URNS HPF: PRESENT /HPF

## 2020-06-07 RX ADMIN — INSULIN LISPRO SCH UNITS: 100 INJECTION, SOLUTION INTRAVENOUS; SUBCUTANEOUS at 12:00

## 2020-06-07 RX ADMIN — INSULIN LISPRO SCH UNITS: 100 INJECTION, SOLUTION INTRAVENOUS; SUBCUTANEOUS at 06:12

## 2020-06-07 RX ADMIN — INSULIN LISPRO SCH UNITS: 100 INJECTION, SOLUTION INTRAVENOUS; SUBCUTANEOUS at 00:30

## 2020-06-07 RX ADMIN — PROCHLORPERAZINE EDISYLATE PRN MG: 5 INJECTION INTRAMUSCULAR; INTRAVENOUS at 11:00

## 2020-06-07 RX ADMIN — INSULIN LISPRO SCH UNITS: 100 INJECTION, SOLUTION INTRAVENOUS; SUBCUTANEOUS at 19:06

## 2020-06-07 RX ADMIN — ONDANSETRON PRN MG: 2 INJECTION INTRAMUSCULAR; INTRAVENOUS at 00:51

## 2020-06-07 RX ADMIN — PANTOPRAZOLE SODIUM SCH MG: 40 INJECTION, POWDER, FOR SOLUTION INTRAVENOUS at 11:01

## 2020-06-07 RX ADMIN — ACETAMINOPHEN PRN MG: 650 SUPPOSITORY RECTAL at 05:18

## 2020-06-07 RX ADMIN — FENTANYL SCH PATCH: 50 PATCH, EXTENDED RELEASE TRANSDERMAL at 21:00

## 2020-06-07 RX ADMIN — DAPTOMYCIN SCH MLS/HR: 500 INJECTION, POWDER, LYOPHILIZED, FOR SOLUTION INTRAVENOUS at 14:14

## 2020-06-07 RX ADMIN — ONDANSETRON PRN MG: 2 INJECTION INTRAMUSCULAR; INTRAVENOUS at 05:18

## 2020-06-07 RX ADMIN — DILTIAZEM HYDROCHLORIDE SCH MLS/HR: 5 INJECTION INTRAVENOUS at 14:12

## 2020-06-07 RX ADMIN — DILTIAZEM HYDROCHLORIDE SCH MLS/HR: 5 INJECTION INTRAVENOUS at 21:46

## 2020-06-07 RX ADMIN — ACETAMINOPHEN PRN MG: 650 SUPPOSITORY RECTAL at 14:41

## 2020-06-07 RX ADMIN — AMIODARONE HYDROCHLORIDE PRN MLS/HR: 50 INJECTION, SOLUTION INTRAVENOUS at 21:46

## 2020-06-07 RX ADMIN — FENTANYL CITRATE PRN MCG: 50 INJECTION INTRAMUSCULAR; INTRAVENOUS at 14:39

## 2020-06-07 RX ADMIN — AMIODARONE HYDROCHLORIDE PRN MLS/HR: 50 INJECTION, SOLUTION INTRAVENOUS at 06:33

## 2020-06-07 RX ADMIN — Medication PRN EACH: at 08:44

## 2020-06-07 NOTE — PDOC
Infectious Disease Note


Subjective


Subjective


Patient's condition worsened.


Lethargic s/p narcan 


Hypotensive s/p IVF, albumin and now on levophed 


Persistent fevers, Tmax 102.2


Started on Meropenem  


Multiple episodes of vomiting 


Bile reported from trach site 


15L via VM over trach. Satting 98%  


Undergoing another PRBC transfusion  


Increase Cr 1.9, UO adequate





ROS


ROS


unobtainable





Vital Sign


Vital Signs





Vital Signs








  Date Time  Temp Pulse Resp B/P (MAP) Pulse Ox O2 Delivery O2 Flow Rate FiO2


 


6/7/20 08:00      Venturi Mask 15.0 


 


6/7/20 08:00  137 28 81/55 (64) 100   


 


6/7/20 04:00 102.2       





 102.2       











Physical Exam


PHYSICAL EXAM


GENERAL: Propped up in bed, lethargic, weak 


HEENT: NGT in place. Oral mucosa dry


NECK:  Tracheostomy 


LUNGS: Diminished aeration bases, no accessory muscle use 


HEART:  S1, S2, tachy, regular 


ABDOMEN: Mild distention, bowel sounds present, soft, grimaces to palpation 


Right lateral side somewhat firm and bleeding from previous drain site.  


: [place] (4/14) 


EXTREMITIES: Trace edema or cyanosis 


SKIN: Pale, no signs of rash 


CNS: Lethargic 


LUE-PICC (5/29) without signs of complications





Labs


Lab





Laboratory Tests








Test


 6/6/20


13:06 6/6/20


13:53 6/6/20


16:30 6/6/20


18:12


 


White Blood Count


 16.9 x10^3/uL


(4.0-11.0) 


 


 





 


Red Blood Count


 2.48 x10^6/uL


(3.50-5.40) 


 


 





 


Hemoglobin


 7.2 g/dL


(12.0-15.5) 


 


 





 


Hematocrit


 22.1 %


(36.0-47.0) 


 


 





 


Mean Corpuscular Volume 89 fL ()    


 


Mean Corpuscular Hemoglobin 29 pg (25-35)    


 


Mean Corpuscular Hemoglobin


Concent 33 g/dL


(31-37) 


 


 





 


Red Cell Distribution Width


 19.5 %


(11.5-14.5) 


 


 





 


Platelet Count


 448 x10^3/uL


(140-400) 


 


 





 


Glucose (Fingerstick)


 


 216 mg/dL


(70-99) 


 199 mg/dL


(70-99)


 


O2 Saturation   82 % (92-99)  


 


Arterial Blood pH


 


 


 7.36


(7.35-7.45) 





 


Arterial Blood pCO2 at


Patient Temp 


 


 39 mmHg


(35-46) 





 


Arterial Blood pO2 at Patient


Temp 


 


 53 mmHg


() 





 


Arterial Blood HCO3


 


 


 22 mmol/L


(21-28) 





 


Arterial Blood Base Excess


 


 


 -4 mmol/L


(-3-3) 





 


FiO2   21  


 


Test


 6/6/20


18:24 6/7/20


00:27 6/7/20


05:15 6/7/20


06:50


 


White Blood Count


 18.7 x10^3/uL


(4.0-11.0) 


 20.0 x10^3/uL


(4.0-11.0) 





 


Red Blood Count


 3.07 x10^6/uL


(3.50-5.40) 


 3.06 x10^6/uL


(3.50-5.40) 





 


Hemoglobin


 9.1 g/dL


(12.0-15.5) 


 8.7 g/dL


(12.0-15.5) 





 


Hematocrit


 27.2 %


(36.0-47.0) 


 27.3 %


(36.0-47.0) 





 


Mean Corpuscular Volume 89 fL ()   89 fL ()  


 


Mean Corpuscular Hemoglobin 30 pg (25-35)   29 pg (25-35)  


 


Mean Corpuscular Hemoglobin


Concent 33 g/dL


(31-37) 


 32 g/dL


(31-37) 





 


Red Cell Distribution Width


 18.0 %


(11.5-14.5) 


 18.5 %


(11.5-14.5) 





 


Platelet Count


 476 x10^3/uL


(140-400) 


 451 x10^3/uL


(140-400) 





 


Glucose (Fingerstick)


 


 247 mg/dL


(70-99) 


 





 


Neutrophils (%) (Auto)   88 % (31-73)  


 


Lymphocytes (%) (Auto)   8 % (24-48)  


 


Monocytes (%) (Auto)   3 % (0-9)  


 


Eosinophils (%) (Auto)   0 % (0-3)  


 


Basophils (%) (Auto)   0 % (0-3)  


 


Neutrophils # (Auto)


 


 


 17.6 x10^3/uL


(1.8-7.7) 





 


Lymphocytes # (Auto)


 


 


 1.7 x10^3/uL


(1.0-4.8) 





 


Monocytes # (Auto)


 


 


 0.6 x10^3/uL


(0.0-1.1) 





 


Eosinophils # (Auto)


 


 


 0.0 x10^3/uL


(0.0-0.7) 





 


Basophils # (Auto)


 


 


 0.0 x10^3/uL


(0.0-0.2) 





 


Sodium Level


 


 


 147 mmol/L


(136-145) 





 


Potassium Level


 


 


 5.3 mmol/L


(3.5-5.1) 





 


Chloride Level


 


 


 113 mmol/L


() 





 


Carbon Dioxide Level


 


 


 25 mmol/L


(21-32) 





 


Anion Gap   9 (6-14)  


 


Blood Urea Nitrogen


 


 


 74 mg/dL


(7-20) 





 


Creatinine


 


 


 1.9 mg/dL


(0.6-1.0) 





 


Estimated GFR


(Cockcroft-Gault) 


 


 28.1 


 





 


BUN/Creatinine Ratio   39 (6-20)  


 


Glucose Level


 


 


 241 mg/dL


(70-99) 





 


Calcium Level


 


 


 10.8 mg/dL


(8.5-10.1) 





 


Total Bilirubin


 


 


 0.6 mg/dL


(0.2-1.0) 





 


Aspartate Amino Transf


(AST/SGOT) 


 


 27 U/L (15-37) 


 





 


Alanine Aminotransferase


(ALT/SGPT) 


 


 20 U/L (14-59) 


 





 


Alkaline Phosphatase


 


 


 104 U/L


() 





 


Total Protein


 


 


 6.3 g/dL


(6.4-8.2) 





 


Albumin


 


 


 1.8 g/dL


(3.4-5.0) 





 


Albumin/Globulin Ratio   0.4 (1.0-1.7)  


 


Prothrombin Time


 


 


 


 16.0 SEC


(11.7-14.0)


 


Prothromb Time International


Ratio 


 


 


 1.3 (0.8-1.1) 





 


Activated Partial


Thromboplast Time 


 


 


 32 SEC (24-38) 





 


Lactic Acid Level


 


 


 


 1.7 mmol/L


(0.4-2.0)


 


Test


 6/7/20


08:08 


 


 





 


Hemoglobin


 8.3 g/dL


(12.0-15.5) 


 


 





 


Hematocrit


 26.0 %


(36.0-47.0) 


 


 





 


Mean Corpuscular Hemoglobin


Concent 32 g/dL


(31-37) 


 


 








CT A/P, 6/6


IMPRESSION:


1. Removal of the percutaneous pigtail drainage catheters since the prior 


exam. Sequela of pancreatitis with extensive pseudocysts again 


demonstrated, the right-sided collections are slightly larger since the 


prior exam, the left-sided collections are stable. See above.


2. Moderate to large left pleural effusion with atelectasis and collapse 


of most of the left lower lobe, stable. Small right pleural effusion is 


stable.


3. Gallstone.





Objective


Assessment


Sepsis with hypotension. Now on levophed  


Fevers. Previous BC, UC and cath tip neg.  


Leukocytosis - trending up 


Gallstone pancreatitis with necrosis. 


   -CT A/P 6/6 showed multiple pseudocysts, slight larger on the right. 


   -s/p drain 4/27. C. parapsilosis. s/p drain 5/6 + yeast & high amylase; s/p 

additional drain on 5/8. Drains now removed. 


Ascites s/p paracentesis 4/15 & 5/6. C. parapsilosis 


JDE. off HD. Cr worse today 


Anemia - s/p PRBCs


Pleural effusions s/p left thoracentesis, 5/12. no culture.


Respiratory failure s/p tracheotomy 


N/V


Prediabetes


HTN





Plan


Plan of Care


BC x 2 


UA C&S


Change [place] cath 


Continue meropenem, dose adjusted for renal function 


add micafungin 


Monitor lab values/VS 


Maintain aspiration precaution


Supportive care





Critically ill





D/w nursing 


D/w Dr. Castano 





also add Dapto 





Patient seen. Chart reviewed. Case discussed with NP. Agree with above plan/.











HAVEN WINTERS         Jun 7, 2020 11:18


BISMARK HERNANDEZ MD              Jun 7, 2020 20:37

## 2020-06-07 NOTE — PDOC
PULMONARY PROGRESS NOTES


Subjective


Patient intubated on 3/23 , s/p trach 4/6, 


transferred back to ICU 6/5 for syncope with collapse


On going Anemia, blood drainage from RLQ abdomen drain site, and surrounding 

firmness  


On low dose levo for hypotension


s/p IVF boluses/ albumin given


Vitals





Vital Signs








  Date Time  Temp Pulse Resp B/P (MAP) Pulse Ox O2 Delivery O2 Flow Rate FiO2


 


6/7/20 08:00      Venturi Mask 15.0 


 


6/7/20 08:00  137 28 81/55 (64) 100   


 


6/7/20 04:00 102.2       





 102.2       








General:  No acute distress, Lethargic


Lungs:  Other (diminshed in bases, Rhonci in LLL)


Cardiovascular:  S1, S2


Abdomen:  Soft, Non-tender, Other (bleeding from Sx. site RLQ and firmness)


Extremities:  Other (+1 BLE edema)


Skin:  Warm, Dry


Labs





Laboratory Tests








Test


 6/5/20


09:42 6/5/20


12:26 6/5/20


16:00 6/5/20


17:45


 


Glucose (Fingerstick)


 177 mg/dL


(70-99) 220 mg/dL


(70-99) 


 209 mg/dL


(70-99)


 


White Blood Count


 


 


 32.2 x10^3/uL


(4.0-11.0) 





 


Red Blood Count


 


 


 2.97 x10^6/uL


(3.50-5.40) 





 


Hemoglobin


 


 


 8.4 g/dL


(12.0-15.5) 





 


Hematocrit


 


 


 28.3 %


(36.0-47.0) 





 


Mean Corpuscular Volume   95 fL ()  


 


Mean Corpuscular Hemoglobin   28 pg (25-35)  


 


Mean Corpuscular Hemoglobin


Concent 


 


 30 g/dL


(31-37) 





 


Red Cell Distribution Width


 


 


 20.8 %


(11.5-14.5) 





 


Platelet Count


 


 


 787 x10^3/uL


(140-400) 





 


Sodium Level


 


 


 151 mmol/L


(136-145) 





 


Potassium Level


 


 


 4.6 mmol/L


(3.5-5.1) 





 


Chloride Level


 


 


 113 mmol/L


() 





 


Carbon Dioxide Level


 


 


 19 mmol/L


(21-32) 





 


Anion Gap   19 (6-14)  


 


Blood Urea Nitrogen


 


 


 58 mg/dL


(7-20) 





 


Creatinine


 


 


 1.8 mg/dL


(0.6-1.0) 





 


Estimated GFR


(Cockcroft-Gault) 


 


 29.9 


 





 


BUN/Creatinine Ratio   32 (6-20)  


 


Glucose Level


 


 


 241 mg/dL


(70-99) 





 


Calcium Level


 


 


 11.0 mg/dL


(8.5-10.1) 





 


Total Bilirubin


 


 


 0.5 mg/dL


(0.2-1.0) 





 


Aspartate Amino Transf


(AST/SGOT) 


 


 31 U/L (15-37) 


 





 


Alanine Aminotransferase


(ALT/SGPT) 


 


 23 U/L (14-59) 


 





 


Alkaline Phosphatase


 


 


 131 U/L


() 





 


Total Protein


 


 


 7.0 g/dL


(6.4-8.2) 





 


Albumin


 


 


 1.9 g/dL


(3.4-5.0) 





 


Albumin/Globulin Ratio   0.4 (1.0-1.7)  


 


Prothrombin Time


 


 


 


 15.3 SEC


(11.7-14.0)


 


Prothromb Time International


Ratio 


 


 


 1.3 (0.8-1.1) 





 


Lactic Acid Level


 


 


 


 1.9 mmol/L


(0.4-2.0)


 


Test


 6/5/20


23:35 6/6/20


05:53 6/6/20


05:55 6/6/20


06:45


 


Glucose (Fingerstick)


 178 mg/dL


(70-99) 216 mg/dL


(70-99) 


 





 


Sodium Level


 


 


 147 mmol/L


(136-145) 





 


Potassium Level


 


 


 5.1 mmol/L


(3.5-5.1) 





 


Chloride Level


 


 


 113 mmol/L


() 





 


Carbon Dioxide Level


 


 


 26 mmol/L


(21-32) 





 


Anion Gap   8 (6-14)  


 


Blood Urea Nitrogen


 


 


 69 mg/dL


(7-20) 





 


Creatinine


 


 


 1.9 mg/dL


(0.6-1.0) 





 


Estimated GFR


(Cockcroft-Gault) 


 


 28.1 


 





 


Glucose Level


 


 


 240 mg/dL


(70-99) 





 


Calcium Level


 


 


 10.4 mg/dL


(8.5-10.1) 





 


Phosphorus Level


 


 


 4.0 mg/dL


(2.6-4.7) 





 


Magnesium Level


 


 


 2.4 mg/dL


(1.8-2.4) 





 


Amylase Level


 


 


 385 U/L


() 





 


Lipase


 


 


 563 U/L


() 





 


White Blood Count


 


 


 


 21.2 x10^3/uL


(4.0-11.0)


 


Red Blood Count


 


 


 


 2.44 x10^6/uL


(3.50-5.40)


 


Hemoglobin


 


 


 


 7.0 g/dL


(12.0-15.5)


 


Hematocrit


 


 


 


 22.2 %


(36.0-47.0)


 


Mean Corpuscular Volume    91 fL () 


 


Mean Corpuscular Hemoglobin    29 pg (25-35) 


 


Mean Corpuscular Hemoglobin


Concent 


 


 


 31 g/dL


(31-37)


 


Red Cell Distribution Width


 


 


 


 21.0 %


(11.5-14.5)


 


Platelet Count


 


 


 


 570 x10^3/uL


(140-400)


 


Neutrophils (%) (Auto)    85 % (31-73) 


 


Lymphocytes (%) (Auto)    11 % (24-48) 


 


Monocytes (%) (Auto)    4 % (0-9) 


 


Eosinophils (%) (Auto)    0 % (0-3) 


 


Basophils (%) (Auto)    0 % (0-3) 


 


Neutrophils # (Auto)


 


 


 


 18.0 x10^3/uL


(1.8-7.7)


 


Lymphocytes # (Auto)


 


 


 


 2.2 x10^3/uL


(1.0-4.8)


 


Monocytes # (Auto)


 


 


 


 0.9 x10^3/uL


(0.0-1.1)


 


Eosinophils # (Auto)


 


 


 


 0.1 x10^3/uL


(0.0-0.7)


 


Basophils # (Auto)


 


 


 


 0.0 x10^3/uL


(0.0-0.2)


 


Test


 6/6/20


13:06 6/6/20


13:53 6/6/20


16:30 6/6/20


18:12


 


White Blood Count


 16.9 x10^3/uL


(4.0-11.0) 


 


 





 


Red Blood Count


 2.48 x10^6/uL


(3.50-5.40) 


 


 





 


Hemoglobin


 7.2 g/dL


(12.0-15.5) 


 


 





 


Hematocrit


 22.1 %


(36.0-47.0) 


 


 





 


Mean Corpuscular Volume 89 fL ()    


 


Mean Corpuscular Hemoglobin 29 pg (25-35)    


 


Mean Corpuscular Hemoglobin


Concent 33 g/dL


(31-37) 


 


 





 


Red Cell Distribution Width


 19.5 %


(11.5-14.5) 


 


 





 


Platelet Count


 448 x10^3/uL


(140-400) 


 


 





 


Glucose (Fingerstick)


 


 216 mg/dL


(70-99) 


 199 mg/dL


(70-99)


 


O2 Saturation   82 % (92-99)  


 


Arterial Blood pH


 


 


 7.36


(7.35-7.45) 





 


Arterial Blood pCO2 at


Patient Temp 


 


 39 mmHg


(35-46) 





 


Arterial Blood pO2 at Patient


Temp 


 


 53 mmHg


() 





 


Arterial Blood HCO3


 


 


 22 mmol/L


(21-28) 





 


Arterial Blood Base Excess


 


 


 -4 mmol/L


(-3-3) 





 


FiO2   21  


 


Test


 6/6/20


18:24 6/7/20


00:27 6/7/20


05:15 6/7/20


06:50


 


White Blood Count


 18.7 x10^3/uL


(4.0-11.0) 


 20.0 x10^3/uL


(4.0-11.0) 





 


Red Blood Count


 3.07 x10^6/uL


(3.50-5.40) 


 3.06 x10^6/uL


(3.50-5.40) 





 


Hemoglobin


 9.1 g/dL


(12.0-15.5) 


 8.7 g/dL


(12.0-15.5) 





 


Hematocrit


 27.2 %


(36.0-47.0) 


 27.3 %


(36.0-47.0) 





 


Mean Corpuscular Volume 89 fL ()   89 fL ()  


 


Mean Corpuscular Hemoglobin 30 pg (25-35)   29 pg (25-35)  


 


Mean Corpuscular Hemoglobin


Concent 33 g/dL


(31-37) 


 32 g/dL


(31-37) 





 


Red Cell Distribution Width


 18.0 %


(11.5-14.5) 


 18.5 %


(11.5-14.5) 





 


Platelet Count


 476 x10^3/uL


(140-400) 


 451 x10^3/uL


(140-400) 





 


Glucose (Fingerstick)


 


 247 mg/dL


(70-99) 


 





 


Neutrophils (%) (Auto)   88 % (31-73)  


 


Lymphocytes (%) (Auto)   8 % (24-48)  


 


Monocytes (%) (Auto)   3 % (0-9)  


 


Eosinophils (%) (Auto)   0 % (0-3)  


 


Basophils (%) (Auto)   0 % (0-3)  


 


Neutrophils # (Auto)


 


 


 17.6 x10^3/uL


(1.8-7.7) 





 


Lymphocytes # (Auto)


 


 


 1.7 x10^3/uL


(1.0-4.8) 





 


Monocytes # (Auto)


 


 


 0.6 x10^3/uL


(0.0-1.1) 





 


Eosinophils # (Auto)


 


 


 0.0 x10^3/uL


(0.0-0.7) 





 


Basophils # (Auto)


 


 


 0.0 x10^3/uL


(0.0-0.2) 





 


Sodium Level


 


 


 147 mmol/L


(136-145) 





 


Potassium Level


 


 


 5.3 mmol/L


(3.5-5.1) 





 


Chloride Level


 


 


 113 mmol/L


() 





 


Carbon Dioxide Level


 


 


 25 mmol/L


(21-32) 





 


Anion Gap   9 (6-14)  


 


Blood Urea Nitrogen


 


 


 74 mg/dL


(7-20) 





 


Creatinine


 


 


 1.9 mg/dL


(0.6-1.0) 





 


Estimated GFR


(Cockcroft-Gault) 


 


 28.1 


 





 


BUN/Creatinine Ratio   39 (6-20)  


 


Glucose Level


 


 


 241 mg/dL


(70-99) 





 


Calcium Level


 


 


 10.8 mg/dL


(8.5-10.1) 





 


Total Bilirubin


 


 


 0.6 mg/dL


(0.2-1.0) 





 


Aspartate Amino Transf


(AST/SGOT) 


 


 27 U/L (15-37) 


 





 


Alanine Aminotransferase


(ALT/SGPT) 


 


 20 U/L (14-59) 


 





 


Alkaline Phosphatase


 


 


 104 U/L


() 





 


Total Protein


 


 


 6.3 g/dL


(6.4-8.2) 





 


Albumin


 


 


 1.8 g/dL


(3.4-5.0) 





 


Albumin/Globulin Ratio   0.4 (1.0-1.7)  


 


Prothrombin Time


 


 


 


 16.0 SEC


(11.7-14.0)


 


Prothromb Time International


Ratio 


 


 


 1.3 (0.8-1.1) 





 


Activated Partial


Thromboplast Time 


 


 


 32 SEC (24-38) 





 


Lactic Acid Level


 


 


 


 1.7 mmol/L


(0.4-2.0)


 


Test


 6/7/20


08:08 


 


 





 


Hemoglobin


 8.3 g/dL


(12.0-15.5) 


 


 





 


Hematocrit


 26.0 %


(36.0-47.0) 


 


 





 


Mean Corpuscular Hemoglobin


Concent 32 g/dL


(31-37) 


 


 











Laboratory Tests








Test


 6/6/20


13:06 6/6/20


13:53 6/6/20


16:30 6/6/20


18:12


 


White Blood Count


 16.9 x10^3/uL


(4.0-11.0) 


 


 





 


Red Blood Count


 2.48 x10^6/uL


(3.50-5.40) 


 


 





 


Hemoglobin


 7.2 g/dL


(12.0-15.5) 


 


 





 


Hematocrit


 22.1 %


(36.0-47.0) 


 


 





 


Mean Corpuscular Volume 89 fL ()    


 


Mean Corpuscular Hemoglobin 29 pg (25-35)    


 


Mean Corpuscular Hemoglobin


Concent 33 g/dL


(31-37) 


 


 





 


Red Cell Distribution Width


 19.5 %


(11.5-14.5) 


 


 





 


Platelet Count


 448 x10^3/uL


(140-400) 


 


 





 


Glucose (Fingerstick)


 


 216 mg/dL


(70-99) 


 199 mg/dL


(70-99)


 


O2 Saturation   82 % (92-99)  


 


Arterial Blood pH


 


 


 7.36


(7.35-7.45) 





 


Arterial Blood pCO2 at


Patient Temp 


 


 39 mmHg


(35-46) 





 


Arterial Blood pO2 at Patient


Temp 


 


 53 mmHg


() 





 


Arterial Blood HCO3


 


 


 22 mmol/L


(21-28) 





 


Arterial Blood Base Excess


 


 


 -4 mmol/L


(-3-3) 





 


FiO2   21  


 


Test


 6/6/20


18:24 6/7/20


00:27 6/7/20


05:15 6/7/20


06:50


 


White Blood Count


 18.7 x10^3/uL


(4.0-11.0) 


 20.0 x10^3/uL


(4.0-11.0) 





 


Red Blood Count


 3.07 x10^6/uL


(3.50-5.40) 


 3.06 x10^6/uL


(3.50-5.40) 





 


Hemoglobin


 9.1 g/dL


(12.0-15.5) 


 8.7 g/dL


(12.0-15.5) 





 


Hematocrit


 27.2 %


(36.0-47.0) 


 27.3 %


(36.0-47.0) 





 


Mean Corpuscular Volume 89 fL ()   89 fL ()  


 


Mean Corpuscular Hemoglobin 30 pg (25-35)   29 pg (25-35)  


 


Mean Corpuscular Hemoglobin


Concent 33 g/dL


(31-37) 


 32 g/dL


(31-37) 





 


Red Cell Distribution Width


 18.0 %


(11.5-14.5) 


 18.5 %


(11.5-14.5) 





 


Platelet Count


 476 x10^3/uL


(140-400) 


 451 x10^3/uL


(140-400) 





 


Glucose (Fingerstick)


 


 247 mg/dL


(70-99) 


 





 


Neutrophils (%) (Auto)   88 % (31-73)  


 


Lymphocytes (%) (Auto)   8 % (24-48)  


 


Monocytes (%) (Auto)   3 % (0-9)  


 


Eosinophils (%) (Auto)   0 % (0-3)  


 


Basophils (%) (Auto)   0 % (0-3)  


 


Neutrophils # (Auto)


 


 


 17.6 x10^3/uL


(1.8-7.7) 





 


Lymphocytes # (Auto)


 


 


 1.7 x10^3/uL


(1.0-4.8) 





 


Monocytes # (Auto)


 


 


 0.6 x10^3/uL


(0.0-1.1) 





 


Eosinophils # (Auto)


 


 


 0.0 x10^3/uL


(0.0-0.7) 





 


Basophils # (Auto)


 


 


 0.0 x10^3/uL


(0.0-0.2) 





 


Sodium Level


 


 


 147 mmol/L


(136-145) 





 


Potassium Level


 


 


 5.3 mmol/L


(3.5-5.1) 





 


Chloride Level


 


 


 113 mmol/L


() 





 


Carbon Dioxide Level


 


 


 25 mmol/L


(21-32) 





 


Anion Gap   9 (6-14)  


 


Blood Urea Nitrogen


 


 


 74 mg/dL


(7-20) 





 


Creatinine


 


 


 1.9 mg/dL


(0.6-1.0) 





 


Estimated GFR


(Cockcroft-Gault) 


 


 28.1 


 





 


BUN/Creatinine Ratio   39 (6-20)  


 


Glucose Level


 


 


 241 mg/dL


(70-99) 





 


Calcium Level


 


 


 10.8 mg/dL


(8.5-10.1) 





 


Total Bilirubin


 


 


 0.6 mg/dL


(0.2-1.0) 





 


Aspartate Amino Transf


(AST/SGOT) 


 


 27 U/L (15-37) 


 





 


Alanine Aminotransferase


(ALT/SGPT) 


 


 20 U/L (14-59) 


 





 


Alkaline Phosphatase


 


 


 104 U/L


() 





 


Total Protein


 


 


 6.3 g/dL


(6.4-8.2) 





 


Albumin


 


 


 1.8 g/dL


(3.4-5.0) 





 


Albumin/Globulin Ratio   0.4 (1.0-1.7)  


 


Prothrombin Time


 


 


 


 16.0 SEC


(11.7-14.0)


 


Prothromb Time International


Ratio 


 


 


 1.3 (0.8-1.1) 





 


Activated Partial


Thromboplast Time 


 


 


 32 SEC (24-38) 





 


Lactic Acid Level


 


 


 


 1.7 mmol/L


(0.4-2.0)


 


Test


 6/7/20


08:08 


 


 





 


Hemoglobin


 8.3 g/dL


(12.0-15.5) 


 


 





 


Hematocrit


 26.0 %


(36.0-47.0) 


 


 





 


Mean Corpuscular Hemoglobin


Concent 32 g/dL


(31-37) 


 


 











Medications





Active Scripts








 Medications  Dose


 Route/Sig


 Max Daily Dose Days Date Category


 


 Bisoprolol


 Fumarate 5 Mg


 Tablet  10 Mg


 PO DAILY


   3/16/20 Reported








Comments


CXR 6/6/2020








IMPRESSION: Lines and tubes as described above. Left greater than right 


lower lobe opacities most likely pulmonary edema and left greater than 


right mild pleural effusions are stable. Superimposed left lower lobe 


pneumonia is not excluded.





ct abdomen /pelvis 6/6


1. Removal of the percutaneous pigtail drainage catheters since the prior 


exam. Sequela of pancreatitis with extensive pseudocysts again 


demonstrated, the right-sided collections are slightly larger since the 


prior exam, the left-sided collections are stable. See above.


2. Moderate to large left pleural effusion with atelectasis and collapse 


of most of the left lower lobe, stable. Small right pleural effusion is 


stable.


3. Gallstone.





Impression


.


IMPRESSION:


1.  Acute hypoxemic respiratory failure secondary to ARDS status post 

trach,stable on minimal O2


2.  Gallstone pancreatitis, now with ongoing bleeding from prior drain. Anemic. 

s/p Tx 2 units, hypotensive, possible septic again (increase wbc, hypotension)


3.  Severe metabolic acidosis.stable


4.  Acute kidney injury-, Off HD--renal function decling. suspect JED on CKD due

to hypotension 


5.  Acute gallstone pancreatitis.


6.  Hypoalbuminemia.


7.  Moderate persistent effusions, s/p left thora  5/12, reaccumulation of left 

effusion. O2 requirement not changed. will benefit from repeat thora in am


8.  New Fever- ,hypotension. suspect recurrent sepsis/ likely pancreatic source.

 Per ID, per surgery--


9.  Chronic anemia-- ongoing / s/p PRBC


10. Covid 19 testing negative


11. Moderate to large ascites-S/P paracentisis


12.S/P paracentisis with 4 liters removed on 4/15/20


13. S/P IR drain placement on 5/8/2020, removal


14. Depression/Anxiety 


1





Plan


.


1.Continue supplemental oxygen via trach shield--  PMV/capping as tolerated/ prn

suction.


2. d/w surgery Dr Summers regarding bleeding from drain/ hypotension. Rec 

supportive care. Will consult IR. Tx and low dose levo/ aggressive IVF/albumin


3. Follow surgery recs-- Acute pancreatitis with persistent necrosis ---- 4/27 

status post ROBERT drain placement + C paropsilosis; 5/6 + yeast & high amylase; s/p

additional drains on 5/8, removal of drains and now increase pseudocyst and 

increase fluid collection. likely infected fluid/ sepsis. Initiate BS abx.


4. Follow ID recs for ABX-- currently off ABX leukocytosis . ID following. 

consider adding back Abx


5. Follow nephrology recs --- cr. is worsening / IVF and monitor


6. Continue TPN 


7. monitor HGB, transfuse 1 unit again today, had 2 units 6/6


8. Ct abdomen reviewed. will arrange another thoracentesis in am


9. Check amalyse and lipase,


10. s/p  thoracentesis, 5/12, 3 litres removed


11. d/w IR in detail


     cct 35 min





DVT/GI PPX: / protonix--- holding lovenox 2/2 anemia


PT/OT


D/W RN 





addend: d/w Dr Summers again. Informed about persistent bloody drainage from 

previous drain sites. He will ask IR again to place new drains as she is likely 

becoming septic from abdominal source. ? Hemorrhage within enlarging pancreatic 

pseudocyst/ severe necrotic pancrease. Not a surgical candidate. Prognosis poor.











SHARYN SOLORZANO MD                  Jun 7, 2020 08:55

## 2020-06-07 NOTE — PDOC
IR POST PROC PROGRESS NOTE








Keith





Pre-Procedure Diagnosis


Pre-Procedure Diagnosis


Pancreatitis/Sepsis





Post-Procedure Diagnosis


Post-Procedure Diagnosis


same





Procedure Performed


Procedure Performed


CT guided placement of 3 percutaneous drains


1. 14 Fr drain placed into R peritoneal fluid collection in psoas region - 100ml

of thin reddish fluid drained. Lots of remaining phlegmon


2. 14 Fr. drain placed into LUQ peritoneal fluid collection - 250ml of thin 

reddish fluid drained. Remaining phlegmon present.


3. 14 Fr. drain placed into Peripancreatic fluid collection - approx 25ml thin 

reddish fluid drained. Significant remaining phlegmon.





Orders for all 3 drains to be flushed have been ordered.





Type of Anesthesia


Anesthesia:Local


local with moderate sedation





Estimated Blood Loss


Estimated Blood Loss


trace





Drains/Tubes


Drains/Tubes


(3) 14 Fr drains placed





Condition of Patient


Condition of Patient


stable





Disposition


Disposition


back to ICU











GERALDINE CORNEJO Jr, MD             Jun 7, 2020 17:36

## 2020-06-07 NOTE — PDOC
GENERAL


General:


Patient examined chart reviewed discussed with nursing.  Unfortunately patient 

continues to clinically decline today with more hypotension, increasing 

tachycardia, and fever.  CT abdomen pelvis notable for the severe pancreatitis 

with massive pseudocysts and question of necrosis, no obvious hematoma noted 

though patient continues to bleed from her old drainage tube site.  Repeat 

urinalysis, blood culture were drawn today.  Patient also has a large left 

pleural effusion which is most likely sequelae of the severe pancreatitis and 

fluid shifts.  Appreciate subspecialty support.  Discussions are ongoing with 

family regarding goals of care and obviously very challenging given her young 

age and prior healthy baseline.  We will continue current regimen otherwise.  

Total time today is 30 minutes with greater than 50% in counseling and 

coordination of care most of which in discussion with nursing and extensive 

review of records


Problems:  


(1) Chronic progressive renal failure, stage 3 (moderate)


(2) Acute blood loss anemia


(3) Gallstone pancreatitis


(4) Tracheostomy dependence


(5) Chronic respiratory failure


(6) Sepsis





VITAL SIGNS


Vital Signs/I&O:





                                   Vital Signs








  Date Time  Temp Pulse Resp B/P (MAP) Pulse Ox O2 Delivery O2 Flow Rate FiO2


 


20 11:00  133 30 113/66 (82) 100 Venturi Mask 15.0 


 


20 08:30 100.2       





 100.2       














                                    I & O   


 


 20





 15:00 23:00 07:00


 


Intake Total  550 ml 2087 ml


 


Output Total 415 ml 375 ml 505 ml


 


Balance -415 ml 175 ml 1582 ml





Patient is sleeping does not arouse on my evaluation today she appears 

comfortable


HEENT exam is unremarkable for acute abnormality


Neck is soft and supple no adenopathy or thyromegaly noted


Chest bilateral equal air entry though diminished throughout no crackles or 

wheezes are noted


Heart S1-S2 normal tachycardic no murmurs or gallops are noted


Abdomen is distended absent bowel sounds no masses organomegaly noted


Extremity exam is unremarkable for acute abnormality





ALLERGIES


Allergies:





Allergies








Coded Allergies Type Severity Reaction Last Updated Verified


 


  codeine Allergy Intermediate rash 3/16/20 Yes











MEDS


Medications:





Current Medications








 Medications


  (Trade)  Dose


 Ordered  Sig/Yee


 Route


 PRN Reason  Start Time


 Stop Time Status Last Admin


Dose Admin


 


 Potassium Acetate


 60 meq/Magnesium


 Sulfate 5 meq/


 Multivitamins 10


 ml/Chromium/


 Copper/Manganese/


 Seleni/Zn 1 ml/


 Insulin Human


 Regular 30 unit/


 Total Parenteral


 Nutrition/Amino


 Acids/Dextrose/


 Fat Emulsion


 Intravenous  1,920 ml @ 


 80 mls/hr  TPN  CONT


 IV


   20 22:00


 20 21:59  20 21:54





 


 Norepinephrine


 Bitartrate 8 mg/


 Dextrose  258 ml @ 


 13.332 mls/


 hr  CONT  PRN


 IV


 PER PROTOCOL  20 06:30


    20 06:33





 


 Albumin Human  500 ml @ 


 125 mls/hr  1X  ONCE


 IV


   20 08:15


 20 12:14  20 08:10





 


 Meropenem 1 gm/


 Sodium Chloride  100 ml @ 


 200 mls/hr  Q8HRS


 IV


   20 10:00


 20 13:00  20 11:04





 


 Sodium Chloride  1,000 ml @ 


 1,000 mls/hr  1X  ONCE


 IV


   20 10:45


 20 11:44 DC 20 11:06














LAB


Lab:





                                Laboratory Tests








Test


 20


13:06 20


13:53 20


16:30 20


18:12


 


White Blood Count


 16.9 x10^3/uL


(4.0-11.0)  H 


 


 





 


Red Blood Count


 2.48 x10^6/uL


(3.50-5.40)  L 


 


 





 


Hemoglobin


 7.2 g/dL


(12.0-15.5)  L 


 


 





 


Hematocrit


 22.1 %


(36.0-47.0)  L 


 


 





 


Mean Corpuscular Volume


 89 fL ()


 


 


 





 


Mean Corpuscular Hemoglobin 29 pg (25-35)     


 


Mean Corpuscular Hemoglobin


Concent 33 g/dL


(31-37) 


 


 





 


Red Cell Distribution Width


 19.5 %


(11.5-14.5)  H 


 


 





 


Platelet Count


 448 x10^3/uL


(140-400)  H 


 


 





 


Glucose (Fingerstick)


 


 216 mg/dL


(70-99)  H 


 199 mg/dL


(70-99)  H


 


O2 Saturation   82 % (92-99)  L 


 


Arterial Blood pH


 


 


 7.36


(7.35-7.45) 





 


Arterial Blood pCO2 at


Patient Temp 


 


 39 mmHg


(35-46) 





 


Arterial Blood pO2 at Patient


Temp 


 


 53 mmHg


()  L 





 


Arterial Blood HCO3


 


 


 22 mmol/L


(21-28) 





 


Arterial Blood Base Excess


 


 


 -4 mmol/L


(-3-3)  L 





 


FiO2   21   


 


Test


 20


18:24 20


00:27 20


05:15 20


06:50


 


White Blood Count


 18.7 x10^3/uL


(4.0-11.0)  H 


 20.0 x10^3/uL


(4.0-11.0)  H 





 


Red Blood Count


 3.07 x10^6/uL


(3.50-5.40)  L 


 3.06 x10^6/uL


(3.50-5.40)  L 





 


Hemoglobin


 9.1 g/dL


(12.0-15.5)  L 


 8.7 g/dL


(12.0-15.5)  L 





 


Hematocrit


 27.2 %


(36.0-47.0)  L 


 27.3 %


(36.0-47.0)  L 





 


Mean Corpuscular Volume


 89 fL ()


 


 89 fL ()


 





 


Mean Corpuscular Hemoglobin 30 pg (25-35)    29 pg (25-35)   


 


Mean Corpuscular Hemoglobin


Concent 33 g/dL


(31-37) 


 32 g/dL


(31-37) 





 


Red Cell Distribution Width


 18.0 %


(11.5-14.5)  H 


 18.5 %


(11.5-14.5)  H 





 


Platelet Count


 476 x10^3/uL


(140-400)  H 


 451 x10^3/uL


(140-400)  H 





 


Glucose (Fingerstick)


 


 247 mg/dL


(70-99)  H 


 





 


Neutrophils (%) (Auto)   88 % (31-73)  H 


 


Lymphocytes (%) (Auto)   8 % (24-48)  L 


 


Monocytes (%) (Auto)   3 % (0-9)   


 


Eosinophils (%) (Auto)   0 % (0-3)   


 


Basophils (%) (Auto)   0 % (0-3)   


 


Neutrophils # (Auto)


 


 


 17.6 x10^3/uL


(1.8-7.7)  H 





 


Lymphocytes # (Auto)


 


 


 1.7 x10^3/uL


(1.0-4.8) 





 


Monocytes # (Auto)


 


 


 0.6 x10^3/uL


(0.0-1.1) 





 


Eosinophils # (Auto)


 


 


 0.0 x10^3/uL


(0.0-0.7) 





 


Basophils # (Auto)


 


 


 0.0 x10^3/uL


(0.0-0.2) 





 


Sodium Level


 


 


 147 mmol/L


(136-145)  H 





 


Potassium Level


 


 


 5.3 mmol/L


(3.5-5.1)  H 





 


Chloride Level


 


 


 113 mmol/L


()  H 





 


Carbon Dioxide Level


 


 


 25 mmol/L


(21-32) 





 


Anion Gap   9 (6-14)   


 


Blood Urea Nitrogen


 


 


 74 mg/dL


(7-20)  H 





 


Creatinine


 


 


 1.9 mg/dL


(0.6-1.0)  H 





 


Estimated GFR


(Cockcroft-Gault) 


 


 28.1  


 





 


BUN/Creatinine Ratio   39 (6-20)  H 


 


Glucose Level


 


 


 241 mg/dL


(70-99)  H 





 


Calcium Level


 


 


 10.8 mg/dL


(8.5-10.1)  H 





 


Total Bilirubin


 


 


 0.6 mg/dL


(0.2-1.0) 





 


Aspartate Amino Transferase


(AST) 


 


 27 U/L (15-37)


 





 


Alanine Aminotransferase (ALT)


 


 


 20 U/L (14-59)


 





 


Alkaline Phosphatase


 


 


 104 U/L


() 





 


Total Protein


 


 


 6.3 g/dL


(6.4-8.2)  L 





 


Albumin


 


 


 1.8 g/dL


(3.4-5.0)  L 





 


Albumin/Globulin Ratio


 


 


 0.4 (1.0-1.7)


L 





 


Prothrombin Time


 


 


 


 16.0 SEC


(11.7-14.0)  H


 


Prothrombin Time INR


 


 


 


 1.3 (0.8-1.1)


H


 


Activated Partial


Thromboplast Time 


 


 


 32 SEC (24-38)





 


Lactic Acid Level


 


 


 


 1.7 mmol/L


(0.4-2.0)


 


Test


 20


08:08 


 


 





 


Hemoglobin


 8.3 g/dL


(12.0-15.5)  L 


 


 





 


Hematocrit


 26.0 %


(36.0-47.0)  L 


 


 





 


Mean Corpuscular Hemoglobin


Concent 32 g/dL


(31-37) 


 


 








                                Laboratory Tests


20 13:06








20 18:24








20 05:15








20 08:08








                                Laboratory Tests


20 05:15











IMAGING


Imaging:


PATIENT: JESENIA BEAN  ACCOUNT: [id number]     MRN#: [id number]


: 1970           LOCATION: [address] [place] [address]      AGE: 49


SEX: F                    EXAM DT: 20         ACCESSION#: [id number]


STATUS: ADM IN            ORD. PHYSICIAN: SHARYN SOLORZANO MD


REASON: abd pain PANCREATITIS, SEPSIS ICU EXT [id number]


PROCEDURE: CT ABDOMEN PELVIS WO CONTRAST





CT abdomen and pelvis without contrast


 


PQRS statement: CT scans at this facility use dose reduction including 


either automated exposure control, iterative reconstructions, and /or 


weight based radiation dosing via mA and kV modification when appropriate 


to reduce radiation dose to as low as reasonably achievable.


 


HISTORY: Abdominal pain. Pancreatitis. Sepsis.


 


COMPARISON: CT abdomen and pelvis May 27, 2020.


 


Abdomen findings: Small right pleural effusion. Moderate to large left 


pleural effusion with volume loss collapse with atelectasis of most of the


left lower lobe. This is similar the prior study. Nasogastric tube. The 


right percutaneous pigtail drainage catheter at the lower pararenal 


retroperitoneum on the prior exam has since been removed, no 


pneumoperitoneum or retroperitoneal soft tissue emphysema evident. 


Gallstone. 1.2 cm liver cyst stable. Mild right renal hydronephrosis renal


pelvis diameter is 1 cm. Spleen, left kidney, adrenals unremarkable. Much 


of the density of the pancreas is absent at the proximal tail and body and


head the distal tail is somewhat still present, this raises the 


probability of pancreatic necrosis from pancreatitis similar the prior 


exam, could be further assessed with postcontrast imaging. There is a 


dominant organized fluid collection surrounding the pancreas again 


demonstrated typical of pseudocyst with extensive multiloculated fluid 


collections within the retroperitoneum along the anterior and posterior 


pararenal spaces extending to the upper pelvis along the upper iliac 


crests again demonstrated, globally the size of the fluid collections are 


somewhat larger along the right anterior pararenal space is an posterior 


pararenal space, left-sided fluid collections are grossly stable. Bowel 


loops are displaced by these fluid collections. No bowel obstruction or 


inflammation evident. Appendix is negative.


 


Pelvis findings: Mild dependent pelvic free fluid stable. Left lower 


quadrant perigastric contrast catheter is been removed. Bladder, rectum, 


uterus, ovaries and bones are unremarkable.


 


IMPRESSION:


1. Removal of the percutaneous pigtail drainage catheters since the prior 


exam. Sequela of pancreatitis with extensive pseudocysts again 


demonstrated, the right-sided collections are slightly larger since the 


prior exam, the left-sided collections are stable. See above.


2. Moderate to large left pleural effusion with atelectasis and collapse 


of most of the left lower lobe, stable. Small right pleural effusion is 


stable.


3. Gallstone.


 


Electronically signed by: Andrea Wallace MD (2020 12:32 PM) 


[id number]














DICTATED and SIGNED BY:     ANDREA WALLACE MD


DATE:     20 1232





ASSESSMENT & PLAN


A&P


Plan as noted above











This note was created using [employer] and may have omissions and/or 

errors due to the nature of real-time voice transcription.





Hemodynamically unstable?:  No


Is patient in severe pain?:  No


Is NPO status required?:  No





Problem Qualifiers





(1) Sepsis:  


Sepsis type:  sepsis due to unspecified organism  Sepsis acute organ dysfunction

status:  with acute organ dysfunction  Severe sepsis acute organ dysfunction t

ype:  unspecified  Severe sepsis shock status:  unspecified  Qualified Codes:  

A41.9 - Sepsis, unspecified organism; R65.20 - Severe sepsis without septic 

shock








NAZARIO COLES MD                 2020 12:02

## 2020-06-07 NOTE — NUR
Dressing change #2, completely saturated and pooling in bed. Dr Castano contacted again for 
further orders and direction. Orders for albumin and NS bolus initiated. Stated he would 
call Kristopher. 



1430 Pt mentation decreased will only open eyes, temp 103.6, rectal tylenol given. 



1510 Pt left with IR for CT guided drain placement, updated family (Beth and Rolf)

## 2020-06-07 NOTE — PDOC
PROGRESS NOTES


Subjective


Subjective


Pt seen earlier in the day;  note that progress note entry later in the evening;

  pt with development of hypotension, tachycardia,  continued large amount of 

drainage from R abdominal drain wound





Objective


Objective





Vital Signs








  Date Time  Temp Pulse Resp B/P (MAP) Pulse Ox O2 Delivery O2 Flow Rate FiO2


 


6/7/20 17:36  136 26  99 Venturi Mask 15.0 


 


6/7/20 17:25    107/52 (70)    


 


6/7/20 12:00 98.6       





 98.6       














Intake and Output 


 


 6/7/20





 07:00


 


Intake Total 2637 ml


 


Output Total 1295 ml


 


Balance 1342 ml


 


 


 


IV Total 2637 ml


 


Output Urine Total 1295 ml


 


# Bowel Movements 2











Physical Exam


Physical Exam


pt appears ill,  abdomen firm on right side,  dark bloody drainage from R 

abdominal drain site





Assessment


Assessment


Problems


Medical Problems:


(1) Acute pancreatitis


Status: Acute  





(2) Cholelithiasis


Status: Acute  











Plan


Plan of Care


CT reviewed;  large amount of fluid collections,  pt with septic appearance;  

recommend IR percutaneous drainage of collections.  Will discuss with IR MD on 

call.





Comment


Review of Relevant


I have reviewed the following items josy (where applicable) has been applied.


Labs





Laboratory Tests








Test


 6/5/20


23:35 6/6/20


05:53 6/6/20


05:55 6/6/20


06:45


 


Glucose (Fingerstick)


 178 mg/dL


(70-99) 216 mg/dL


(70-99) 


 





 


Sodium Level


 


 


 147 mmol/L


(136-145) 





 


Potassium Level


 


 


 5.1 mmol/L


(3.5-5.1) 





 


Chloride Level


 


 


 113 mmol/L


() 





 


Carbon Dioxide Level


 


 


 26 mmol/L


(21-32) 





 


Anion Gap   8 (6-14)  


 


Blood Urea Nitrogen


 


 


 69 mg/dL


(7-20) 





 


Creatinine


 


 


 1.9 mg/dL


(0.6-1.0) 





 


Estimated GFR


(Cockcroft-Gault) 


 


 28.1 


 





 


Glucose Level


 


 


 240 mg/dL


(70-99) 





 


Calcium Level


 


 


 10.4 mg/dL


(8.5-10.1) 





 


Phosphorus Level


 


 


 4.0 mg/dL


(2.6-4.7) 





 


Magnesium Level


 


 


 2.4 mg/dL


(1.8-2.4) 





 


Amylase Level


 


 


 385 U/L


() 





 


Lipase


 


 


 563 U/L


() 





 


White Blood Count


 


 


 


 21.2 x10^3/uL


(4.0-11.0)


 


Red Blood Count


 


 


 


 2.44 x10^6/uL


(3.50-5.40)


 


Hemoglobin


 


 


 


 7.0 g/dL


(12.0-15.5)


 


Hematocrit


 


 


 


 22.2 %


(36.0-47.0)


 


Mean Corpuscular Volume    91 fL () 


 


Mean Corpuscular Hemoglobin    29 pg (25-35) 


 


Mean Corpuscular Hemoglobin


Concent 


 


 


 31 g/dL


(31-37)


 


Red Cell Distribution Width


 


 


 


 21.0 %


(11.5-14.5)


 


Platelet Count


 


 


 


 570 x10^3/uL


(140-400)


 


Neutrophils (%) (Auto)    85 % (31-73) 


 


Lymphocytes (%) (Auto)    11 % (24-48) 


 


Monocytes (%) (Auto)    4 % (0-9) 


 


Eosinophils (%) (Auto)    0 % (0-3) 


 


Basophils (%) (Auto)    0 % (0-3) 


 


Neutrophils # (Auto)


 


 


 


 18.0 x10^3/uL


(1.8-7.7)


 


Lymphocytes # (Auto)


 


 


 


 2.2 x10^3/uL


(1.0-4.8)


 


Monocytes # (Auto)


 


 


 


 0.9 x10^3/uL


(0.0-1.1)


 


Eosinophils # (Auto)


 


 


 


 0.1 x10^3/uL


(0.0-0.7)


 


Basophils # (Auto)


 


 


 


 0.0 x10^3/uL


(0.0-0.2)


 


Test


 6/6/20


13:06 6/6/20


13:53 6/6/20


16:30 6/6/20


18:12


 


White Blood Count


 16.9 x10^3/uL


(4.0-11.0) 


 


 





 


Red Blood Count


 2.48 x10^6/uL


(3.50-5.40) 


 


 





 


Hemoglobin


 7.2 g/dL


(12.0-15.5) 


 


 





 


Hematocrit


 22.1 %


(36.0-47.0) 


 


 





 


Mean Corpuscular Volume 89 fL ()    


 


Mean Corpuscular Hemoglobin 29 pg (25-35)    


 


Mean Corpuscular Hemoglobin


Concent 33 g/dL


(31-37) 


 


 





 


Red Cell Distribution Width


 19.5 %


(11.5-14.5) 


 


 





 


Platelet Count


 448 x10^3/uL


(140-400) 


 


 





 


Glucose (Fingerstick)


 


 216 mg/dL


(70-99) 


 199 mg/dL


(70-99)


 


O2 Saturation   82 % (92-99)  


 


Arterial Blood pH


 


 


 7.36


(7.35-7.45) 





 


Arterial Blood pCO2 at


Patient Temp 


 


 39 mmHg


(35-46) 





 


Arterial Blood pO2 at Patient


Temp 


 


 53 mmHg


() 





 


Arterial Blood HCO3


 


 


 22 mmol/L


(21-28) 





 


Arterial Blood Base Excess


 


 


 -4 mmol/L


(-3-3) 





 


FiO2   21  


 


Test


 6/6/20


18:24 6/7/20


00:27 6/7/20


05:15 6/7/20


06:50


 


White Blood Count


 18.7 x10^3/uL


(4.0-11.0) 


 20.0 x10^3/uL


(4.0-11.0) 





 


Red Blood Count


 3.07 x10^6/uL


(3.50-5.40) 


 3.06 x10^6/uL


(3.50-5.40) 





 


Hemoglobin


 9.1 g/dL


(12.0-15.5) 


 8.7 g/dL


(12.0-15.5) 





 


Hematocrit


 27.2 %


(36.0-47.0) 


 27.3 %


(36.0-47.0) 





 


Mean Corpuscular Volume 89 fL ()   89 fL ()  


 


Mean Corpuscular Hemoglobin 30 pg (25-35)   29 pg (25-35)  


 


Mean Corpuscular Hemoglobin


Concent 33 g/dL


(31-37) 


 32 g/dL


(31-37) 





 


Red Cell Distribution Width


 18.0 %


(11.5-14.5) 


 18.5 %


(11.5-14.5) 





 


Platelet Count


 476 x10^3/uL


(140-400) 


 451 x10^3/uL


(140-400) 





 


Glucose (Fingerstick)


 


 247 mg/dL


(70-99) 


 





 


Neutrophils (%) (Auto)   88 % (31-73)  


 


Lymphocytes (%) (Auto)   8 % (24-48)  


 


Monocytes (%) (Auto)   3 % (0-9)  


 


Eosinophils (%) (Auto)   0 % (0-3)  


 


Basophils (%) (Auto)   0 % (0-3)  


 


Neutrophils # (Auto)


 


 


 17.6 x10^3/uL


(1.8-7.7) 





 


Lymphocytes # (Auto)


 


 


 1.7 x10^3/uL


(1.0-4.8) 





 


Monocytes # (Auto)


 


 


 0.6 x10^3/uL


(0.0-1.1) 





 


Eosinophils # (Auto)


 


 


 0.0 x10^3/uL


(0.0-0.7) 





 


Basophils # (Auto)


 


 


 0.0 x10^3/uL


(0.0-0.2) 





 


Sodium Level


 


 


 147 mmol/L


(136-145) 





 


Potassium Level


 


 


 5.3 mmol/L


(3.5-5.1) 





 


Chloride Level


 


 


 113 mmol/L


() 





 


Carbon Dioxide Level


 


 


 25 mmol/L


(21-32) 





 


Anion Gap   9 (6-14)  


 


Blood Urea Nitrogen


 


 


 74 mg/dL


(7-20) 





 


Creatinine


 


 


 1.9 mg/dL


(0.6-1.0) 





 


Estimated GFR


(Cockcroft-Gault) 


 


 28.1 


 





 


BUN/Creatinine Ratio   39 (6-20)  


 


Glucose Level


 


 


 241 mg/dL


(70-99) 





 


Calcium Level


 


 


 10.8 mg/dL


(8.5-10.1) 





 


Total Bilirubin


 


 


 0.6 mg/dL


(0.2-1.0) 





 


Aspartate Amino Transf


(AST/SGOT) 


 


 27 U/L (15-37) 


 





 


Alanine Aminotransferase


(ALT/SGPT) 


 


 20 U/L (14-59) 


 





 


Alkaline Phosphatase


 


 


 104 U/L


() 





 


Total Protein


 


 


 6.3 g/dL


(6.4-8.2) 





 


Albumin


 


 


 1.8 g/dL


(3.4-5.0) 





 


Albumin/Globulin Ratio   0.4 (1.0-1.7)  


 


Prothrombin Time


 


 


 


 16.0 SEC


(11.7-14.0)


 


Prothromb Time International


Ratio 


 


 


 1.3 (0.8-1.1) 





 


Activated Partial


Thromboplast Time 


 


 


 32 SEC (24-38) 





 


Lactic Acid Level


 


 


 


 1.7 mmol/L


(0.4-2.0)


 


Test


 6/7/20


08:08 6/7/20


11:30 6/7/20


13:20 6/7/20


18:05


 


Hemoglobin


 8.3 g/dL


(12.0-15.5) 


 9.6 g/dL


(12.0-15.5) 





 


Hematocrit


 26.0 %


(36.0-47.0) 


 28.9 %


(36.0-47.0) 





 


Mean Corpuscular Hemoglobin


Concent 32 g/dL


(31-37) 


 33 g/dL


(31-37) 





 


Urine Collection Type  Unknown   


 


Urine Color  Yellow   


 


Urine Clarity  Cloudy   


 


Urine pH  5.0 (<5.0-8.0)   


 


Urine Specific Gravity


 


 1.020


(1.000-1.030) 


 





 


Urine Protein


 


 30 mg/dL


(NEG-TRACE) 


 





 


Urine Glucose (UA)


 


 Negative mg/dL


(NEG) 


 





 


Urine Ketones (Stick)


 


 Negative mg/dL


(NEG) 


 





 


Urine Blood  Negative (NEG)   


 


Urine Nitrite  Negative (NEG)   


 


Urine Bilirubin  Negative (NEG)   


 


Urine Urobilinogen Dipstick


 


 0.2 mg/dL (0.2


mg/dL) 


 





 


Urine Leukocyte Esterase  Small (NEG)   


 


Urine RBC  0 /HPF (0-2)   


 


Urine WBC


 


 11-20 /HPF


(0-4) 


 





 


Urine Squamous Epithelial


Cells 


 Mod /LPF 


 


 





 


Urine Transitional Epithelial


Cells 


 Mod /LPF 


 


 





 


Urine Amorphous Sediment  Present /HPF   


 


Urine Bacteria


 


 Few /HPF


(0-FEW) 


 





 


Urine Hyaline Casts  Many /HPF   


 


Urine Granular Casts  Many /HPF   


 


Urine Mucus  Marked /LPF   


 


Urine Yeast  Present /HPF   


 


White Blood Count


 


 


 20.9 x10^3/uL


(4.0-11.0) 





 


Red Blood Count


 


 


 3.23 x10^6/uL


(3.50-5.40) 





 


Mean Corpuscular Volume   89 fL ()  


 


Mean Corpuscular Hemoglobin   30 pg (25-35)  


 


Red Cell Distribution Width


 


 


 17.0 %


(11.5-14.5) 





 


Platelet Count


 


 


 433 x10^3/uL


(140-400) 





 


O2 Saturation    95 % (92-99) 


 


Arterial Blood pH


 


 


 


 7.25


(7.35-7.45)


 


Arterial Blood pCO2 at


Patient Temp 


 


 


 48 mmHg


(35-46)


 


Arterial Blood pO2 at Patient


Temp 


 


 


 92 mmHg


()


 


Arterial Blood HCO3


 


 


 


 21 mmol/L


(21-28)


 


Arterial Blood Base Excess


 


 


 


 -6 mmol/L


(-3-3)


 


FiO2    50 


 


Test


 6/7/20


18:30 6/7/20


18:37 


 





 


White Blood Count


 21.2 x10^3/uL


(4.0-11.0) 


 


 





 


Red Blood Count


 2.51 x10^6/uL


(3.50-5.40) 


 


 





 


Hemoglobin


 7.4 g/dL


(12.0-15.5) 


 


 





 


Hematocrit


 22.6 %


(36.0-47.0) 


 


 





 


Mean Corpuscular Volume 90 fL ()    


 


Mean Corpuscular Hemoglobin 30 pg (25-35)    


 


Mean Corpuscular Hemoglobin


Concent 33 g/dL


(31-37) 


 


 





 


Red Cell Distribution Width


 17.3 %


(11.5-14.5) 


 


 





 


Platelet Count


 310 x10^3/uL


(140-400) 


 


 





 


Glucose (Fingerstick)


 


 234 mg/dL


(70-99) 


 











Laboratory Tests








Test


 6/7/20


00:27 6/7/20


05:15 6/7/20


06:50 6/7/20


08:08


 


Glucose (Fingerstick)


 247 mg/dL


(70-99) 


 


 





 


White Blood Count


 


 20.0 x10^3/uL


(4.0-11.0) 


 





 


Red Blood Count


 


 3.06 x10^6/uL


(3.50-5.40) 


 





 


Hemoglobin


 


 8.7 g/dL


(12.0-15.5) 


 8.3 g/dL


(12.0-15.5)


 


Hematocrit


 


 27.3 %


(36.0-47.0) 


 26.0 %


(36.0-47.0)


 


Mean Corpuscular Volume  89 fL ()   


 


Mean Corpuscular Hemoglobin  29 pg (25-35)   


 


Mean Corpuscular Hemoglobin


Concent 


 32 g/dL


(31-37) 


 32 g/dL


(31-37)


 


Red Cell Distribution Width


 


 18.5 %


(11.5-14.5) 


 





 


Platelet Count


 


 451 x10^3/uL


(140-400) 


 





 


Neutrophils (%) (Auto)  88 % (31-73)   


 


Lymphocytes (%) (Auto)  8 % (24-48)   


 


Monocytes (%) (Auto)  3 % (0-9)   


 


Eosinophils (%) (Auto)  0 % (0-3)   


 


Basophils (%) (Auto)  0 % (0-3)   


 


Neutrophils # (Auto)


 


 17.6 x10^3/uL


(1.8-7.7) 


 





 


Lymphocytes # (Auto)


 


 1.7 x10^3/uL


(1.0-4.8) 


 





 


Monocytes # (Auto)


 


 0.6 x10^3/uL


(0.0-1.1) 


 





 


Eosinophils # (Auto)


 


 0.0 x10^3/uL


(0.0-0.7) 


 





 


Basophils # (Auto)


 


 0.0 x10^3/uL


(0.0-0.2) 


 





 


Sodium Level


 


 147 mmol/L


(136-145) 


 





 


Potassium Level


 


 5.3 mmol/L


(3.5-5.1) 


 





 


Chloride Level


 


 113 mmol/L


() 


 





 


Carbon Dioxide Level


 


 25 mmol/L


(21-32) 


 





 


Anion Gap  9 (6-14)   


 


Blood Urea Nitrogen


 


 74 mg/dL


(7-20) 


 





 


Creatinine


 


 1.9 mg/dL


(0.6-1.0) 


 





 


Estimated GFR


(Cockcroft-Gault) 


 28.1 


 


 





 


BUN/Creatinine Ratio  39 (6-20)   


 


Glucose Level


 


 241 mg/dL


(70-99) 


 





 


Calcium Level


 


 10.8 mg/dL


(8.5-10.1) 


 





 


Total Bilirubin


 


 0.6 mg/dL


(0.2-1.0) 


 





 


Aspartate Amino Transf


(AST/SGOT) 


 27 U/L (15-37) 


 


 





 


Alanine Aminotransferase


(ALT/SGPT) 


 20 U/L (14-59) 


 


 





 


Alkaline Phosphatase


 


 104 U/L


() 


 





 


Total Protein


 


 6.3 g/dL


(6.4-8.2) 


 





 


Albumin


 


 1.8 g/dL


(3.4-5.0) 


 





 


Albumin/Globulin Ratio  0.4 (1.0-1.7)   


 


Prothrombin Time


 


 


 16.0 SEC


(11.7-14.0) 





 


Prothromb Time International


Ratio 


 


 1.3 (0.8-1.1) 


 





 


Activated Partial


Thromboplast Time 


 


 32 SEC (24-38) 


 





 


Lactic Acid Level


 


 


 1.7 mmol/L


(0.4-2.0) 





 


Test


 6/7/20


11:30 6/7/20


13:20 6/7/20


18:05 6/7/20


18:30


 


Urine Collection Type Unknown    


 


Urine Color Yellow    


 


Urine Clarity Cloudy    


 


Urine pH 5.0 (<5.0-8.0)    


 


Urine Specific Gravity


 1.020


(1.000-1.030) 


 


 





 


Urine Protein


 30 mg/dL


(NEG-TRACE) 


 


 





 


Urine Glucose (UA)


 Negative mg/dL


(NEG) 


 


 





 


Urine Ketones (Stick)


 Negative mg/dL


(NEG) 


 


 





 


Urine Blood Negative (NEG)    


 


Urine Nitrite Negative (NEG)    


 


Urine Bilirubin Negative (NEG)    


 


Urine Urobilinogen Dipstick


 0.2 mg/dL (0.2


mg/dL) 


 


 





 


Urine Leukocyte Esterase Small (NEG)    


 


Urine RBC 0 /HPF (0-2)    


 


Urine WBC


 11-20 /HPF


(0-4) 


 


 





 


Urine Squamous Epithelial


Cells Mod /LPF 


 


 


 





 


Urine Transitional Epithelial


Cells Mod /LPF 


 


 


 





 


Urine Amorphous Sediment Present /HPF    


 


Urine Bacteria


 Few /HPF


(0-FEW) 


 


 





 


Urine Hyaline Casts Many /HPF    


 


Urine Granular Casts Many /HPF    


 


Urine Mucus Marked /LPF    


 


Urine Yeast Present /HPF    


 


White Blood Count


 


 20.9 x10^3/uL


(4.0-11.0) 


 21.2 x10^3/uL


(4.0-11.0)


 


Red Blood Count


 


 3.23 x10^6/uL


(3.50-5.40) 


 2.51 x10^6/uL


(3.50-5.40)


 


Hemoglobin


 


 9.6 g/dL


(12.0-15.5) 


 7.4 g/dL


(12.0-15.5)


 


Hematocrit


 


 28.9 %


(36.0-47.0) 


 22.6 %


(36.0-47.0)


 


Mean Corpuscular Volume  89 fL ()   90 fL () 


 


Mean Corpuscular Hemoglobin  30 pg (25-35)   30 pg (25-35) 


 


Mean Corpuscular Hemoglobin


Concent 


 33 g/dL


(31-37) 


 33 g/dL


(31-37)


 


Red Cell Distribution Width


 


 17.0 %


(11.5-14.5) 


 17.3 %


(11.5-14.5)


 


Platelet Count


 


 433 x10^3/uL


(140-400) 


 310 x10^3/uL


(140-400)


 


O2 Saturation   95 % (92-99)  


 


Arterial Blood pH


 


 


 7.25


(7.35-7.45) 





 


Arterial Blood pCO2 at


Patient Temp 


 


 48 mmHg


(35-46) 





 


Arterial Blood pO2 at Patient


Temp 


 


 92 mmHg


() 





 


Arterial Blood HCO3


 


 


 21 mmol/L


(21-28) 





 


Arterial Blood Base Excess


 


 


 -6 mmol/L


(-3-3) 





 


FiO2   50  


 


Test


 6/7/20


18:37 


 


 





 


Glucose (Fingerstick)


 234 mg/dL


(70-99) 


 


 











Microbiology


5/30/20 Gram Stain - Final, Complete


          


5/30/20 Aerobic Culture - Final, Complete


          


5/17/20 Blood Culture - Final, Complete


          NO GROWTH AFTER 5 DAYS


5/6/20 Fungal Culture - Final, Complete


         


5/6/20 Fungal Culture Result 1 - Final, Complete


         


4/12/20 Urine Culture - Final, Complete


          


4/12/20 Urine Culture Result 1 ([facility]) - Final, Complete


Medications





Current Medications


Sodium Chloride 1,000 ml @  1,000 mls/hr Q1H IV  Last administered on 3/16/20at 

03:00;  Start 3/16/20 at 03:00;  Stop 3/16/20 at 03:59;  Status DC


Ondansetron HCl (Zofran) 4 mg 1X  ONCE IVP  Last administered on 3/16/20at 

03:27;  Start 3/16/20 at 03:00;  Stop 3/16/20 at 03:01;  Status DC


Morphine Sulfate (Morphine Sulfate) 4 mg 1X  ONCE IV ;  Start 3/16/20 at 03:00; 

Stop 3/16/20 at 03:01;  Status Cancel


Ketorolac Tromethamine (Toradol 30mg Vial) 30 mg 1X  ONCE IV  Last administered 

on 3/16/20at 02:54;  Start 3/16/20 at 03:00;  Stop 3/16/20 at 03:01;  Status DC


Fentanyl Citrate (Fentanyl 2ml Vial) 25 mcg 1X  ONCE IVP  Last administered on 

3/16/20at 03:23;  Start 3/16/20 at 03:30;  Stop 3/16/20 at 03:31;  Status DC


Fentanyl Citrate (Fentanyl 2ml Vial) 100 mcg STK-MED ONCE .ROUTE ;  Start 

3/16/20 at 03:18;  Stop 3/16/20 at 03:18;  Status DC


Iohexol (Omnipaque 350 Mg/ml) 90 ml 1X  ONCE IV  Last administered on 3/16/20at 

03:25;  Start 3/16/20 at 03:30;  Stop 3/16/20 at 03:31;  Status DC


Info (CONTRAST GIVEN -- Rx MONITORING) 1 each PRN DAILY  PRN MC SEE COMMENTS;  

Start 3/16/20 at 03:30;  Stop 3/18/20 at 03:29;  Status DC


Hydromorphone HCl (Dilaudid) 0.5 mg 1X  ONCE IV  Last administered on 3/16/20at 

03:55;  Start 3/16/20 at 04:30;  Stop 3/16/20 at 04:32;  Status DC


Ondansetron HCl (Zofran) 4 mg PRN Q8HRS  PRN IV NAUSEA/VOMITING 1ST CHOICE;  

Start 3/16/20 at 05:00;  Stop 3/16/20 at 09:27;  Status DC


Morphine Sulfate (Morphine Sulfate) 2 mg PRN Q2HR  PRN IV SEVERE PAIN 7-10 Last 

administered on 3/17/20at 12:26;  Start 3/16/20 at 05:00;  Stop 3/17/20 at 

14:15;  Status DC


Sodium Chloride 1,000 ml @  125 mls/hr Q8H IV  Last administered on 3/16/20at 

20:56;  Start 3/16/20 at 05:00;  Stop 3/17/20 at 04:59;  Status DC


Hydromorphone HCl (Dilaudid) 0.5 mg PRN Q3HRS  PRN IV SEVERE PAIN 7-10 Last 

administered on 3/17/20at 10:06;  Start 3/16/20 at 05:00;  Stop 3/17/20 at 

12:01;  Status DC


Piperacillin Sod/ Tazobactam Sod 4.5 gm/Sodium Chloride 100 ml @  200 mls/hr 1X 

ONCE IV  Last administered on 3/16/20at 05:44;  Start 3/16/20 at 06:00;  Stop 

3/16/20 at 06:29;  Status DC


Ondansetron HCl (Zofran) 4 mg PRN Q4HRS  PRN IV NAUSEA/VOMITING 1ST CHOICE Last 

administered on 6/7/20at 05:18;  Start 3/16/20 at 09:30


Insulin Human Lispro (HumaLOG) 0-9 UNITS Q6HRS SQ  Last administered on 6/7/20at

19:06;  Start 3/16/20 at 09:30


Dextrose (Dextrose 50%-Water Syringe) 12.5 gm PRN Q15MIN  PRN IV SEE COMMENTS;  

Start 3/16/20 at 09:30


Pantoprazole Sodium (PROTONIX VIAL for IV PUSH) 40 mg DAILYAC IVP  Last 

administered on 6/7/20at 11:01;  Start 3/16/20 at 11:30


Prochlorperazine Edisylate (Compazine) 10 mg PRN Q6HRS  PRN IV NAUSEA/VOMITING, 

2nd CHOICE Last administered on 6/7/20at 11:00;  Start 3/16/20 at 17:45


Atenolol (Tenormin) 100 mg DAILY PO ;  Start 3/17/20 at 09:00;  Stop 3/16/20 at 

20:08;  Status DC


Metoprolol Tartrate (Lopressor Vial) 2.5 mg Q6HRS IVP  Last administered on 

3/17/20at 05:51;  Start 3/16/20 at 20:15;  Stop 3/17/20 at 10:02;  Status DC


Metoprolol Tartrate (Lopressor Vial) 5 mg Q6HRS IVP  Last administered on 

3/26/20at 00:12;  Start 3/17/20 at 10:15;  Stop 3/28/20 at 08:48;  Status DC


Hydromorphone HCl (Dilaudid) 1 mg PRN Q3HRS  PRN IV SEVERE PAIN 7-10 Last 

administered on 3/23/20at 05:13;  Start 3/17/20 at 12:00;  Stop 3/31/20 at 

00:25;  Status DC


Lidocaine HCl (Buffered Lidocaine 1%) 3 ml STK-MED ONCE .ROUTE ;  Start 3/17/20 

at 12:55;  Stop 3/17/20 at 12:56;  Status DC


Albumin Human 500 ml @  125 mls/hr 1X  ONCE IV  Last administered on 3/17/20at 

14:33;  Start 3/17/20 at 14:30;  Stop 3/17/20 at 18:32;  Status DC


Norepinephrine Bitartrate 8 mg/ Dextrose 258 ml @  17.299 mls/ hr CONT  PRN IV 

PER PROTOCOL Last administered on 4/14/20at 12:48;  Start 3/17/20 at 15:30;  

Stop 4/17/20 at 09:19;  Status DC


Sodium Chloride 1,000 ml @  125 mls/hr Q8H IV  Last administered on 3/17/20at 

21:04;  Start 3/17/20 at 16:00;  Stop 3/18/20 at 02:42;  Status DC


Albumin Human 500 ml @  125 mls/hr PRN BID  PRN IV After every 2L NSS & BP < 

90mm Last administered on 6/6/20at 11:40;  Start 3/17/20 at 16:00


Iohexol (Omnipaque 300 Mg/ml) 60 ml 1X  ONCE IV  Last administered on 3/17/20at 

17:20;  Start 3/17/20 at 17:00;  Stop 3/17/20 at 17:01;  Status DC


Info (CONTRAST GIVEN -- Rx MONITORING) 1 each PRN DAILY  PRN MC SEE COMMENTS;  

Start 3/17/20 at 17:00;  Stop 3/19/20 at 16:59;  Status DC


Meropenem 1 gm/ Sodium Chloride 100 ml @  200 mls/hr Q8HRS IV  Last administered

on 3/18/20at 05:45;  Start 3/17/20 at 20:00;  Stop 3/18/20 at 08:48;  Status DC


Furosemide (Lasix) 40 mg 1X  ONCE IVP  Last administered on 3/17/20at 22:12;  

Start 3/17/20 at 22:30;  Stop 3/17/20 at 22:31;  Status DC


Calcium Chloride 1000 mg/Sodium Chloride 110 ml @  220 mls/hr 1X  ONCE IV  Last 

administered on 3/17/20at 22:11;  Start 3/17/20 at 22:30;  Stop 3/17/20 at 

22:59;  Status DC


Albuterol Sulfate (Ventolin Neb Soln) 2.5 mg 1X  ONCE NEB  Last administered on 

3/18/20at 00:56;  Start 3/17/20 at 22:30;  Stop 3/17/20 at 22:31;  Status DC


Insulin Human Regular (HumuLIN R VIAL) 5 unit 1X  ONCE IV  Last administered on 

3/17/20at 22:14;  Start 3/17/20 at 22:30;  Stop 3/17/20 at 22:31;  Status DC


Magnesium Sulfate 50 ml @ 25 mls/hr 1X  ONCE IV  Last administered on 3/18/20at 

02:57;  Start 3/18/20 at 03:00;  Stop 3/18/20 at 04:59;  Status DC


Calcium Gluconate 1000 mg/Sodium Chloride 110 ml @  220 mls/hr 1X  ONCE IV  Last

administered on 3/18/20at 02:46;  Start 3/18/20 at 03:00;  Stop 3/18/20 at 

03:29;  Status DC


Sodium Chloride 1,000 ml @  200 mls/hr Q5H IV  Last administered on 3/18/20at 

02:46;  Start 3/18/20 at 03:00;  Stop 3/18/20 at 10:21;  Status DC


Calcium Gluconate 1000 mg/Sodium Chloride 110 ml @  220 mls/hr 1X  ONCE IV  Last

administered on 3/18/20at 03:21;  Start 3/18/20 at 03:30;  Stop 3/18/20 at 03:

59;  Status DC


Sodium Bicarbonate 50 meq/Sodium Chloride 1,050 ml @  75 mls/hr Q14H IV  Last 

administered on 3/22/20at 21:10;  Start 3/18/20 at 07:30;  Stop 3/23/20 at 

10:28;  Status DC


Calcium Gluconate 2000 mg/Sodium Chloride 120 ml @  220 mls/hr 1X  ONCE IV  Last

administered on 3/18/20at 09:05;  Start 3/18/20 at 07:30;  Stop 3/18/20 at 

08:02;  Status DC


Lidocaine HCl (Xylocaine-Mpf 1% 2ml Vial) 2 ml STK-MED ONCE .ROUTE ;  Start 

3/18/20 at 08:47;  Stop 3/18/20 at 08:47;  Status DC


Meropenem 500 mg/ Sodium Chloride 50 ml @  100 mls/hr Q12HR IV  Last 

administered on 3/23/20at 21:01;  Start 3/18/20 at 18:00;  Stop 3/24/20 at 

07:58;  Status DC


Lidocaine HCl (Buffered Lidocaine 1%) 3 ml STK-MED ONCE .ROUTE ;  Start 3/18/20 

at 09:46;  Stop 3/18/20 at 09:46;  Status DC


Lidocaine HCl (Buffered Lidocaine 1%) 6 ml 1X  ONCE INJ  Last administered on 

3/18/20at 10:26;  Start 3/18/20 at 10:15;  Stop 3/18/20 at 10:16;  Status DC


Info (Tpn Per Pharmacy) 1 each PRN DAILY  PRN MC SEE COMMENTS Last administered 

on 6/7/20at 08:44;  Start 3/18/20 at 12:00


Sodium Chloride 1,000 ml @  1,000 mls/hr Q1H PRN IV hypotension;  Start 3/18/20 

at 12:07;  Stop 3/18/20 at 18:06;  Status DC


Diphenhydramine HCl (Benadryl) 25 mg 1X PRN  PRN IV ITCHING;  Start 3/18/20 at 

12:15;  Stop 3/19/20 at 12:14;  Status DC


Diphenhydramine HCl (Benadryl) 25 mg 1X PRN  PRN IV ITCHING;  Start 3/18/20 at 

12:15;  Stop 3/19/20 at 12:14;  Status DC


Sodium Chloride 1,000 ml @  400 mls/hr Q2H30M PRN IV PATENCY;  Start 3/18/20 at 

12:07;  Stop 3/19/20 at 00:06;  Status DC


Info (PHARMACY MONITORING -- do not chart) 1 each PRN DAILY  PRN MC SEE 

COMMENTS;  Start 3/18/20 at 12:15;  Stop 3/20/20 at 08:13;  Status DC


Sodium Chloride 90 meq/Calcium Gluconate 10 meq/ Multivitamins 10 ml/Chromium/ 

Copper/Manganese/ Seleni/Zn 1 ml/ Total Parenteral Nutrition/Amino 

Acids/Dextrose/ Fat Emulsion Intravenous 55.005 ml  @ 2.292 mls/hr TPN  CONT IV 

;  Start 3/18/20 at 22:00;  Stop 3/18/20 at 12:33;  Status DC


Info (Tpn Per Pharmacy) 1 each PRN DAILY  PRN MC SEE COMMENTS;  Start 3/18/20 at

12:30;  Status UNV


Sodium Chloride 90 meq/Calcium Gluconate 10 meq/ Multivitamins 10 ml/Chromium/ 

Copper/Manganese/ Seleni/Zn 0.5 ml/ Total Parenteral Nutrition/Amino 

Acids/Dextrose/ Fat Emulsion Intravenous 1,512 ml @  63 mls/hr TPN  CONT IV  

Last administered on 3/18/20at 22:06;  Start 3/18/20 at 22:00;  Stop 3/19/20 at 

21:59;  Status DC


Calcium Carbonate/ Glycine (Tums) 500 mg PRN AFTMEALHC  PRN PO INDIGESTION;  

Start 3/18/20 at 17:45;  Stop 5/13/20 at 10:25;  Status DC


Calcium Gluconate (Calcium Gluconate) 2,000 mg 1X  ONCE IVP  Last administered 

on 3/19/20at 02:19;  Start 3/19/20 at 02:15;  Stop 3/19/20 at 02:16;  Status DC


Calcium Chloride 3000 mg/Sodium Chloride 1,030 ml @  50 mls/hr [id number] IV  Last 

administered on 3/21/20at 02:17;  Start 3/19/20 at 08:00;  Stop 3/21/20 at 

15:23;  Status DC


Lorazepam (Ativan Inj) 1 mg PRN Q4HRS  PRN IVP ANXIETY / AGITATION, 2nd choic La

st administered on 4/17/20at 03:51;  Start 3/19/20 at 09:00;  Stop 4/17/20 at 

09:19;  Status DC


Sodium Chloride 1,000 ml @  1,000 mls/hr Q1H PRN IV hypotension;  Start 3/19/20 

at 08:56;  Stop 3/19/20 at 14:55;  Status DC


Albumin Human 200 ml @  200 mls/hr 1X PRN  PRN IV Hypotension;  Start 3/19/20 at

09:00;  Stop 3/19/20 at 14:59;  Status DC


Diphenhydramine HCl (Benadryl) 25 mg 1X PRN  PRN IV ITCHING;  Start 3/19/20 at 

09:00;  Stop 3/20/20 at 08:59;  Status DC


Diphenhydramine HCl (Benadryl) 25 mg 1X PRN  PRN IV ITCHING;  Start 3/19/20 at 

09:00;  Stop 3/20/20 at 08:59;  Status DC


Sodium Chloride 1,000 ml @  400 mls/hr Q2H30M PRN IV PATENCY;  Start 3/19/20 at 

08:56;  Stop 3/19/20 at 20:55;  Status DC


Info (PHARMACY MONITORING -- do not chart) 1 each PRN DAILY  PRN MC SEE 

COMMENTS;  Start 3/19/20 at 09:00;  Status UNV


Info (PHARMACY MONITORING -- do not chart) 1 each PRN DAILY  PRN MC SEE 

COMMENTS;  Start 3/19/20 at 09:00;  Stop 3/20/20 at 08:13;  Status DC


Digoxin (Lanoxin) 500 mcg 1X  ONCE IV  Last administered on 3/19/20at 10:04;  

Start 3/19/20 at 10:00;  Stop 3/19/20 at 10:01;  Status DC


Digoxin (Lanoxin) 125 mcg 1X  ONCE IV  Last administered on 3/19/20at 17:10;  

Start 3/19/20 at 18:00;  Stop 3/19/20 at 18:01;  Status DC


Magnesium Sulfate 100 ml @  25 mls/hr 1X  ONCE IV  Last administered on 

3/19/20at 12:48;  Start 3/19/20 at 13:00;  Stop 3/19/20 at 16:59;  Status DC


Sodium Chloride 90 meq/Magnesium Sulfate 10 meq/ Calcium Gluconate 20 meq/ 

Multivitamins 10 ml/Chromium/ Copper/Manganese/ Seleni/Zn 0.5 ml/ Total 

Parenteral Nutrition/Amino Acids/Dextrose/ Fat Emulsion Intravenous 1,512 ml @  

63 mls/hr TPN  CONT IV  Last administered on 3/19/20at 22:25;  Start 3/19/20 at 

22:00;  Stop 3/20/20 at 21:59;  Status DC


Sodium Chloride 1,000 ml @  1,000 mls/hr Q1H PRN IV hypotension;  Start 3/20/20 

at 08:05;  Stop 3/20/20 at 14:04;  Status DC


Albumin Human 200 ml @  200 mls/hr 1X  ONCE IV  Last administered on 3/20/20at 

08:57;  Start 3/20/20 at 08:15;  Stop 3/20/20 at 09:14;  Status DC


Diphenhydramine HCl (Benadryl) 25 mg 1X PRN  PRN IV ITCHING;  Start 3/20/20 at 

08:15;  Stop 3/21/20 at 08:14;  Status DC


Diphenhydramine HCl (Benadryl) 25 mg 1X PRN  PRN IV ITCHING;  Start 3/20/20 at 

08:15;  Stop 3/21/20 at 08:14;  Status DC


Sodium Chloride 1,000 ml @  400 mls/hr Q2H30M PRN IV PATENCY;  Start 3/20/20 at 

08:05;  Stop 3/20/20 at 20:04;  Status DC


Info (PHARMACY MONITORING -- do not chart) 1 each PRN DAILY  PRN MC SEE 

COMMENTS;  Start 3/20/20 at 08:15;  Stop 3/24/20 at 07:57;  Status DC


Sodium Chloride 90 meq/Potassium Chloride 15 meq/ Potassium Phosphate 10 mmol/ 

Magnesium Sulfate 10 meq/Calcium Gluconate 20 meq/ Multivitamins 10 ml/Chromium/

Copper/Manganese/ Seleni/Zn 0.5 ml/ Total Parenteral Nutrition/Amino 

Acids/Dextrose/ Fat Emulsion Intravenous 1,512 ml @  63 mls/hr TPN  CONT IV  

Last administered on 3/20/20at 21:01;  Start 3/20/20 at 22:00;  Stop 3/21/20 at 

21:59;  Status DC


Potassium Chloride/Water 100 ml @  100 mls/hr 1X  ONCE IV  Last administered on 

3/20/20at 14:09;  Start 3/20/20 at 14:00;  Stop 3/20/20 at 14:59;  Status DC


Benzocaine (Hurricaine One) 1 spray 1X  ONCE MM  Last administered on 3/20/20at 

16:38;  Start 3/20/20 at 14:30;  Stop 3/20/20 at 14:31;  Status DC


Lidocaine HCl (Glydo (Lidocaine) Jelly) 1 thomas 1X  ONCE MM  Last administered on 

3/20/20at 16:38;  Start 3/20/20 at 14:30;  Stop 3/20/20 at 14:31;  Status DC


Linezolid/Dextrose 300 ml @  300 mls/hr Q12HR IV  Last administered on 3/26/20at

21:04;  Start 3/20/20 at 20:00;  Stop 3/27/20 at 07:50;  Status DC


Acetaminophen (Tylenol) 650 mg PRN Q6HRS  PRN PO MILD PAIN / TEMP;  Start 

3/21/20 at 03:30;  Stop 3/21/20 at 03:36;  Status DC


Acetaminophen (Tylenol) 650 mg PRN Q6HRS  PRN PEG MILD PAIN / TEMP Last 

administered on 4/16/20at 19:56;  Start 3/21/20 at 03:36;  Stop 5/13/20 at 

10:25;  Status DC


Sodium Chloride 1,000 ml @  1,000 mls/hr Q1H PRN IV hypotension;  Start 3/21/20 

at 07:50;  Stop 3/21/20 at 13:49;  Status DC


Albumin Human 200 ml @  200 mls/hr 1X PRN  PRN IV Hypotension;  Start 3/21/20 at

08:00;  Stop 3/21/20 at 13:59;  Status DC


Sodium Chloride (Normal Saline Flush) 10 ml 1X PRN  PRN IV AP catheter pack;  

Start 3/21/20 at 08:00;  Stop 3/22/20 at 07:59;  Status DC


Sodium Chloride (Normal Saline Flush) 10 ml 1X PRN  PRN IV  catheter pack;  

Start 3/21/20 at 08:00;  Stop 3/22/20 at 07:59;  Status DC


Sodium Chloride 1,000 ml @  400 mls/hr Q2H30M PRN IV PATENCY;  Start 3/21/20 at 

07:50;  Stop 3/21/20 at 19:49;  Status DC


Info (PHARMACY MONITORING -- do not chart) 1 each PRN DAILY  PRN MC SEE 

COMMENTS;  Start 3/21/20 at 08:00;  Status UNV


Info (PHARMACY MONITORING -- do not chart) 1 each PRN DAILY  PRN MC SEE 

COMMENTS;  Start 3/21/20 at 08:00;  Stop 3/23/20 at 08:25;  Status DC


Sodium Chloride 90 meq/Potassium Chloride 15 meq/ Potassium Phosphate 10 mmol/ 

Magnesium Sulfate 10 meq/Calcium Gluconate 20 meq/ Multivitamins 10 ml/Chromium/

Copper/Manganese/ Seleni/Zn 0.5 ml/ Total Parenteral Nutrition/Amino 

Acids/Dextrose/ Fat Emulsion Intravenous 1,512 ml @  63 mls/hr TPN  CONT IV  

Last administered on 3/21/20at 20:57;  Start 3/21/20 at 22:00;  Stop 3/22/20 at 

21:59;  Status DC


Sodium Chloride 90 meq/Potassium Chloride 15 meq/ Potassium Phosphate 15 mmol/ 

Magnesium Sulfate 10 meq/Calcium Gluconate 20 meq/ Multivitamins 10 ml/Chromium/

Copper/Manganese/ Seleni/Zn 0.5 ml/ Total Parenteral Nutrition/Amino 

Acids/Dextrose/ Fat Emulsion Intravenous 1,512 ml @  63 mls/hr TPN  CONT IV ;  

Start 3/22/20 at 22:00;  Stop 3/22/20 at 14:16;  Status DC


Sodium Chloride 90 meq/Potassium Chloride 15 meq/ Potassium Phosphate 15 mmol/ 

Magnesium Sulfate 10 meq/Calcium Gluconate 20 meq/ Multivitamins 10 ml/Chromium/

Copper/Manganese/ Seleni/Zn 0.5 ml/ Total Parenteral Nutrition/Amino 

Acids/Dextrose/ Fat Emulsion Intravenous 1,200 ml @  50 mls/hr TPN  CONT IV ;  

Start 3/22/20 at 22:00;  Stop 3/22/20 at 14:17;  Status DC


Sodium Chloride 90 meq/Potassium Chloride 15 meq/ Potassium Phosphate 10 mmol/ 

Magnesium Sulfate 10 meq/Calcium Gluconate 20 meq/ Multivitamins 10 ml/Chromium/

Copper/Manganese/ Seleni/Zn 0.5 ml/ Total Parenteral Nutrition/Amino 

Acids/Dextrose/ Fat Emulsion Intravenous 1,200 ml @  50 mls/hr TPN  CONT IV  

Last administered on 3/22/20at 23:29;  Start 3/22/20 at 22:00;  Stop 3/23/20 at 

21:59;  Status DC


Sodium Chloride 1,000 ml @  1,000 mls/hr Q1H PRN IV hypotension;  Start 3/23/20 

at 07:28;  Stop 3/23/20 at 13:27;  Status DC


Albumin Human 200 ml @  200 mls/hr 1X  ONCE IV  Last administered on 3/23/20at 

08:51;  Start 3/23/20 at 07:30;  Stop 3/23/20 at 08:29;  Status DC


Diphenhydramine HCl (Benadryl) 25 mg 1X PRN  PRN IV ITCHING;  Start 3/23/20 at 

07:30;  Stop 3/24/20 at 07:29;  Status DC


Diphenhydramine HCl (Benadryl) 25 mg 1X PRN  PRN IV ITCHING;  Start 3/23/20 at 

07:30;  Stop 3/24/20 at 07:29;  Status DC


Sodium Chloride 1,000 ml @  400 mls/hr Q2H30M PRN IV PATENCY;  Start 3/23/20 at 

07:28;  Stop 3/23/20 at 19:27;  Status DC


Info (PHARMACY MONITORING -- do not chart) 1 each PRN DAILY  PRN MC SEE 

COMMENTS;  Start 3/23/20 at 07:30;  Stop 4/3/20 at 13:01;  Status DC


Metronidazole 100 ml @  100 mls/hr Q6HRS IV  Last administered on 4/8/20at 

06:26;  Start 3/23/20 at 08:30;  Stop 4/8/20 at 09:58;  Status DC


Micafungin Sodium 100 mg/Dextrose 100 ml @  100 mls/hr Q24H IV  Last 

administered on 4/30/20at 08:18;  Start 3/23/20 at 09:00;  Stop 4/30/20 at 

20:58;  Status DC


Propofol 0 ml @ As Directed STK-MED ONCE IV ;  Start 3/23/20 at 07:53;  Stop 

3/23/20 at 07:53;  Status DC


Etomidate (Amidate) 20 mg STK-MED ONCE IV ;  Start 3/23/20 at 07:53;  Stop 

3/23/20 at 07:54;  Status DC


Midazolam HCl (Versed) 5 mg STK-MED ONCE .ROUTE ;  Start 3/23/20 at 07:57;  Stop

3/23/20 at 07:57;  Status DC


Fentanyl Citrate 30 ml @ 0 mls/hr CONT  PRN IV SEE PROTOCOL Last administered on

4/17/20at 06:12;  Start 3/23/20 at 08:15;  Stop 4/17/20 at 09:19;  Status DC


Artificial Tears (Artificial Tears) 1 drop PRN Q1HR  PRN OU DRY EYE, 1st choice;

 Start 3/23/20 at 08:15;  Stop 4/29/20 at 05:31;  Status DC


Midazolam HCl 50 mg/Sodium Chloride 50 ml @ 0 mls/hr CONT  PRN IV SEE PROTOCOL 

Last administered on 3/26/20at 22:39;  Start 3/23/20 at 08:15;  Stop 3/28/20 at 

15:59;  Status DC


Etomidate (Amidate) 8 mg 1X  ONCE IV  Last administered on 3/23/20at 08:33;  

Start 3/23/20 at 08:30;  Stop 3/23/20 at 08:31;  Status DC


Succinylcholine Chloride (Anectine) 120 mg 1X  ONCE IV  Last administered on 

3/23/20at 08:34;  Start 3/23/20 at 08:30;  Stop 3/23/20 at 08:31;  Status DC


Midazolam HCl (Versed) 5 mg 1X  ONCE IV ;  Start 3/23/20 at 08:30;  Stop 3/23/20

at 08:31;  Status DC


Potassium Chloride 15 meq/ Bicarbonate Dialysis Soln w/ out KCl 5,007.5 ml  @ 

1,000 mls/ hr Q5H1M IV  Last administered on 3/24/20at 11:11;  Start 3/23/20 at 

12:00;  Stop 3/24/20 at 11:15;  Status DC


Potassium Chloride 15 meq/ Bicarbonate Dialysis Soln w/ out KCl 5,007.5 ml  @ 

1,000 mls/ hr Q5H1M IV  Last administered on 3/24/20at 11:12;  Start 3/23/20 at 

12:00;  Stop 3/24/20 at 11:17;  Status DC


Potassium Chloride 15 meq/ Bicarbonate Dialysis Soln w/ out KCl 5,007.5 ml  @ 

1,000 mls/ hr Q5H1M IV  Last administered on 3/24/20at 11:11;  Start 3/23/20 at 

12:00;  Stop 3/24/20 at 11:19;  Status DC


Sodium Chloride 90 meq/Potassium Chloride 15 meq/ Potassium Phosphate 10 mmol/ 

Magnesium Sulfate 10 meq/Calcium Gluconate 20 meq/ Multivitamins 10 ml/Chromium/

Copper/Manganese/ Seleni/Zn 0.5 ml/ Total Parenteral Nutrition/Amino 

Acids/Dextrose/ Fat Emulsion Intravenous 1,400 ml @  58.333 mls/ hr TPN  CONT IV

 Last administered on 3/23/20at 21:42;  Start 3/23/20 at 22:00;  Stop 3/24/20 at

21:59;  Status DC


Heparin Sodium (Porcine) (Heparin Sodium) 5,000 unit Q8HRS SQ  Last administered

on 3/28/20at 05:55;  Start 3/23/20 at 15:00;  Stop 3/28/20 at 13:28;  Status DC


Meropenem 500 mg/ Sodium Chloride 50 ml @  100 mls/hr Q6HRS IV  Last 

administered on 3/25/20at 06:00;  Start 3/24/20 at 09:00;  Stop 3/25/20 at 07:29

;  Status DC


Potassium Phosphate 20 mmol/ Sodium Chloride 106.6667 ml @  51.667 m... 1X  ONCE

IV  Last administered on 3/24/20at 11:22;  Start 3/24/20 at 10:15;  Stop 3/24/20

at 12:18;  Status DC


Acetaminophen (Tylenol Supp) 650 mg PRN Q6HRS  PRN IA MILD PAIN / TEMP > 100.3'F

Last administered on 6/7/20at 14:41;  Start 3/24/20 at 10:30


Potassium Chloride/Water 100 ml @  100 mls/hr Q1H IV  Last administered on 

3/24/20at 12:12;  Start 3/24/20 at 11:00;  Stop 3/24/20 at 12:59;  Status DC


Potassium Chloride 20 meq/ Bicarbonate Dialysis Soln w/ out KCl 5,010 ml @  

1,000 mls/hr Q5H1M IV  Last administered on 3/25/20at 08:48;  Start 3/24/20 at 

12:00;  Stop 3/25/20 at 13:03;  Status DC


Potassium Chloride 20 meq/ Bicarbonate Dialysis Soln w/ out KCl 5,010 ml @  

1,000 mls/hr Q5H1M IV  Last administered on 3/29/20at 14:52;  Start 3/24/20 at 

11:30;  Stop 3/29/20 at 19:59;  Status DC


Potassium Chloride 20 meq/ Bicarbonate Dialysis Soln w/ out KCl 5,010 ml @  

1,000 mls/hr Q5H1M IV  Last administered on 3/29/20at 14:53;  Start 3/24/20 at 

11:30;  Stop 3/29/20 at 19:59;  Status DC


Sodium Chloride 90 meq/Potassium Chloride 15 meq/ Potassium Phosphate 15 mmol/ 

Magnesium Sulfate 10 meq/Calcium Gluconate 15 meq/ Multivitamins 10 ml/Chromium/

Copper/Manganese/ Seleni/Zn 0.5 ml/ Total Parenteral Nutrition/Amino 

Acids/Dextrose/ Fat Emulsion Intravenous 1,400 ml @  58.333 mls/ hr TPN  CONT IV

 Last administered on 3/24/20at 22:17;  Start 3/24/20 at 22:00;  Stop 3/25/20 at

21:59;  Status DC


Cefepime HCl (Maxipime) 2 gm Q12HR IVP  Last administered on 4/7/20at 20:56;  

Start 3/25/20 at 09:00;  Stop 4/8/20 at 09:58;  Status DC


Daptomycin 500 mg/ Sodium Chloride 50 ml @  100 mls/hr Q48H IV  Last 

administered on 4/10/20at 09:57;  Start 3/25/20 at 08:30;  Stop 4/10/20 at 

10:07;  Status DC


Lidocaine HCl (Buffered Lidocaine 1%) 3 ml 1X  ONCE INJ  Last administered on 

3/25/20at 10:27;  Start 3/25/20 at 10:30;  Stop 3/25/20 at 10:31;  Status DC


Potassium Phosphate 20 mmol/ Sodium Chloride 106.6667 ml @  51.667 m... 1X  ONCE

IV  Last administered on 3/25/20at 12:51;  Start 3/25/20 at 13:00;  Stop 3/25/20

at 15:03;  Status DC


Sodium Chloride 90 meq/Potassium Chloride 15 meq/ Potassium Phosphate 18 mmol/ 

Magnesium Sulfate 8 meq/Calcium Gluconate 15 meq/ Multivitamins 10 ml/Chromium/ 

Copper/Manganese/ Seleni/Zn 0.5 ml/ Total Parenteral Nutrition/Amino 

Acids/Dextrose/ Fat Emulsion Intravenous 1,400 ml @  58.333 mls/ hr TPN  CONT IV

 Last administered on 3/25/20at 22:16;  Start 3/25/20 at 22:00;  Stop 3/26/20 at

21:59;  Status DC


Potassium Chloride 20 meq/ Bicarbonate Dialysis Soln w/ out KCl 5,010 ml @  

1,000 mls/hr Q5H1M IV  Last administered on 3/29/20at 14:54;  Start 3/25/20 at 

16:00;  Stop 3/29/20 at 19:59;  Status DC


Multi-Ingred Cream/Lotion/Oil/ Oint (Artificial Tears Eye Ointment) 1 thomas PRN 

Q1HR  PRN OU DRY EYE, 2nd choice Last administered on 4/13/20at 08:19;  Start 

3/25/20 at 17:30;  Stop 6/3/20 at 14:39;  Status DC


Sodium Chloride 90 meq/Potassium Chloride 15 meq/ Potassium Phosphate 18 mmol/ 

Magnesium Sulfate 8 meq/Calcium Gluconate 15 meq/ Multivitamins 10 ml/Chromium/ 

Copper/Manganese/ Seleni/Zn 0.5 ml/ Total Parenteral Nutrition/Amino 

Acids/Dextrose/ Fat Emulsion Intravenous 1,400 ml @  58.333 mls/ hr TPN  CONT IV

 Last administered on 3/26/20at 22:00;  Start 3/26/20 at 22:00;  Stop 3/27/20 at

21:59;  Status DC


Albumin Human 500 ml @  125 mls/hr 1X  ONCE IV ;  Start 3/26/20 at 14:15;  Stop 

3/26/20 at 18:14;  Status DC


Sodium Chloride 90 meq/Potassium Chloride 15 meq/ Potassium Phosphate 18 mmol/ 

Magnesium Sulfate 8 meq/Calcium Gluconate 15 meq/ Multivitamins 10 ml/Chromium/ 

Copper/Manganese/ Seleni/Zn 0.5 ml/ Insulin Human Regular 10 unit/ Total 

Parenteral Nutrition/Amino Acids/Dextrose/ Fat Emulsion Intravenous 1,400 ml @  

58.333 mls/ hr TPN  CONT IV  Last administered on 3/27/20at 21:43;  Start 

3/27/20 at 22:00;  Stop 3/28/20 at 21:59;  Status DC


Lidocaine HCl (Buffered Lidocaine 1%) 3 ml STK-MED ONCE .ROUTE ;  Start 3/25/20 

at 10:00;  Stop 3/27/20 at 13:57;  Status DC


Midazolam HCl 100 mg/Sodium Chloride 100 ml @ 7 mls/hr CONT  PRN IV SEE PROTOCOL

Last administered on 4/8/20at 15:35;  Start 3/28/20 at 16:00;  Stop 6/3/20 at 

14:38;  Status DC


Sodium Chloride 90 meq/Potassium Chloride 15 meq/ Potassium Phosphate 18 mmol/ 

Magnesium Sulfate 8 meq/Calcium Gluconate 15 meq/ Multivitamins 10 ml/Chromium/ 

Copper/Manganese/ Seleni/Zn 0.5 ml/ Insulin Human Regular 15 unit/ Total 

Parenteral Nutrition/Amino Acids/Dextrose/ Fat Emulsion Intravenous 1,400 ml @  

58.333 mls/ hr TPN  CONT IV  Last administered on 3/28/20at 20:34;  Start 

3/28/20 at 22:00;  Stop 3/29/20 at 21:59;  Status DC


Info ([facility] Electrolyte Protocol) 1 ea CONT PRN  PRN MC PER PROTOCOL;  Start 

3/29/20 at 13:15


Sodium Chloride 90 meq/Potassium Chloride 15 meq/ Potassium Phosphate 18 mmol/ 

Magnesium Sulfate 8 meq/Calcium Gluconate 15 meq/ Multivitamins 10 ml/Chromium/ 

Copper/Manganese/ Seleni/Zn 0.5 ml/ Insulin Human Regular 15 unit/ Total 

Parenteral Nutrition/Amino Acids/Dextrose/ Fat Emulsion Intravenous 1,400 ml @  

58.333 mls/ hr TPN  CONT IV  Last administered on 3/29/20at 22:05;  Start 3/29/2

0 at 22:00;  Stop 3/30/20 at 21:59;  Status DC


Potassium Chloride 15 meq/ Bicarbonate Dialysis Soln w/ out KCl 5,007.5 ml  @ 

1,000 mls/ hr Q5H1M IV  Last administered on 4/1/20at 18:14;  Start 3/29/20 at 

20:00;  Stop 4/2/20 at 13:08;  Status DC


Potassium Chloride 15 meq/ Bicarbonate Dialysis Soln w/ out KCl 5,007.5 ml  @ 

1,000 mls/ hr Q5H1M IV  Last administered on 4/1/20at 18:14;  Start 3/29/20 at 

20:00;  Stop 4/2/20 at 13:08;  Status DC


Potassium Chloride 15 meq/ Bicarbonate Dialysis Soln w/ out KCl 5,007.5 ml  @ 

1,000 mls/ hr Q5H1M IV  Last administered on 4/1/20at 18:14;  Start 3/29/20 at 

20:00;  Stop 4/2/20 at 13:08;  Status DC


Iohexol (Omnipaque 240 Mg/ml) 30 ml 1X  ONCE PO  Last administered on 3/30/20at 

11:30;  Start 3/30/20 at 11:30;  Stop 3/30/20 at 11:33;  Status DC


Info (CONTRAST GIVEN -- Rx MONITORING) 1 each PRN DAILY  PRN MC SEE COMMENTS;  

Start 3/30/20 at 11:45;  Stop 4/1/20 at 11:44;  Status DC


Sodium Chloride 90 meq/Potassium Chloride 15 meq/ Potassium Phosphate 18 mmol/ 

Magnesium Sulfate 8 meq/Calcium Gluconate 15 meq/ Multivitamins 10 ml/Chromium/ 

Copper/Manganese/ Seleni/Zn 0.5 ml/ Insulin Human Regular 15 unit/ Total 

Parenteral Nutrition/Amino Acids/Dextrose/ Fat Emulsion Intravenous 1,400 ml @  

58.333 mls/ hr TPN  CONT IV  Last administered on 3/30/20at 21:47;  Start 

3/30/20 at 22:00;  Stop 3/31/20 at 21:59;  Status DC


Sodium Chloride 90 meq/Potassium Chloride 15 meq/ Potassium Phosphate 18 mmol/ 

Magnesium Sulfate 8 meq/Calcium Gluconate 15 meq/ Multivitamins 10 ml/Chromium/ 

Copper/Manganese/ Seleni/Zn 0.5 ml/ Insulin Human Regular 20 unit/ Total 

Parenteral Nutrition/Amino Acids/Dextrose/ Fat Emulsion Intravenous 1,400 ml @  

58.333 mls/ hr TPN  CONT IV  Last administered on 3/31/20at 21:36;  Start 

3/31/20 at 22:00;  Stop 4/1/20 at 21:59;  Status DC


Alteplase, Recombinant (Cathflo For Central Catheter Clearance) 1 mg 1X  ONCE 

INT CAT  Last administered on 3/31/20at 20:03;  Start 3/31/20 at 19:30;  Stop 

3/31/20 at 19:46;  Status DC


Alteplase, Recombinant (Cathflo For Central Catheter Clearance) 1 mg 1X  ONCE 

INT CAT  Last administered on 3/31/20at 22:05;  Start 3/31/20 at 22:00;  Stop 

3/31/20 at 22:01;  Status DC


Sodium Chloride 90 meq/Potassium Chloride 15 meq/ Potassium Phosphate 18 mmol/ 

Magnesium Sulfate 8 meq/Calcium Gluconate 15 meq/ Multivitamins 10 ml/Chromium/ 

Copper/Manganese/ Seleni/Zn 0.5 ml/ Insulin Human Regular 20 unit/ Total 

Parenteral Nutrition/Amino Acids/Dextrose/ Fat Emulsion Intravenous 1,400 ml @  

58.333 mls/ hr TPN  CONT IV  Last administered on 4/1/20at 21:30;  Start 4/1/20 

at 22:00;  Stop 4/2/20 at 21:59;  Status DC


Dexmedetomidine HCl 400 mcg/ Sodium Chloride 100 ml @ 0 mls/hr CONT  PRN IV ANXI

ETY / AGITATION Last administered on 5/30/20at 12:57;  Start 4/2/20 at 08:15;  

Stop 5/30/20 at 18:31;  Status DC


Sodium Chloride 500 ml @  500 mls/hr 1X PRN  PRN IV ELEVATED BP, SEE COMMENTS;  

Start 4/2/20 at 08:15


Atropine Sulfate (ATROPINE 0.5mg SYRINGE) 0.5 mg PRN Q5MIN  PRN IV SEE COMMENTS;

 Start 4/2/20 at 08:15


Furosemide (Lasix) 20 mg 1X  ONCE IVP  Last administered on 4/2/20at 08:19;  

Start 4/2/20 at 08:15;  Stop 4/2/20 at 08:16;  Status DC


Lidocaine HCl (Buffered Lidocaine 1%) 3 ml STK-MED ONCE .ROUTE ;  Start 4/2/20 

at 08:39;  Stop 4/2/20 at 08:39;  Status DC


Lidocaine HCl (Buffered Lidocaine 1%) 6 ml 1X  ONCE INJ  Last administered on 

4/2/20at 09:05;  Start 4/2/20 at 09:00;  Stop 4/2/20 at 09:06;  Status DC


Sodium Chloride 90 meq/Potassium Chloride 15 meq/ Potassium Phosphate 18 mmol/ 

Magnesium Sulfate 8 meq/Calcium Gluconate 15 meq/ Multivitamins 10 ml/Chromium/ 

Copper/Manganese/ Seleni/Zn 0.5 ml/ Insulin Human Regular 20 unit/ Total 

Parenteral Nutrition/Amino Acids/Dextrose/ Fat Emulsion Intravenous 1,400 ml @  

58.333 mls/ hr TPN  CONT IV  Last administered on 4/2/20at 22:45;  Start 4/2/20 

at 22:00;  Stop 4/3/20 at 21:59;  Status DC


Sodium Chloride 1,000 ml @  1,000 mls/hr Q1H PRN IV hypotension;  Start 4/3/20 

at 07:30;  Stop 4/3/20 at 13:29;  Status DC


Albumin Human 200 ml @  200 mls/hr 1X PRN  PRN IV Hypotension Last administered 

on 4/3/20at 09:36;  Start 4/3/20 at 07:30;  Stop 4/3/20 at 13:29;  Status DC


Sodium Chloride (Normal Saline Flush) 10 ml 1X PRN  PRN IV AP catheter pack;  

Start 4/3/20 at 07:30;  Stop 4/3/20 at 21:29;  Status DC


Sodium Chloride (Normal Saline Flush) 10 ml 1X PRN  PRN IV  catheter pack;  

Start 4/3/20 at 07:30;  Stop 4/4/20 at 07:29;  Status DC


Sodium Chloride 1,000 ml @  400 mls/hr Q2H30M PRN IV PATENCY;  Start 4/3/20 at 

07:30;  Stop 4/3/20 at 19:29;  Status DC


Info (PHARMACY MONITORING -- do not chart) 1 each PRN DAILY  PRN MC SEE 

COMMENTS;  Start 4/3/20 at 07:30;  Stop 4/3/20 at 13:02;  Status DC


Info (PHARMACY MONITORING -- do not chart) 1 each PRN DAILY  PRN MC SEE CO

MMENTS;  Start 4/3/20 at 07:30;  Stop 4/5/20 at 12:45;  Status DC


Sodium Chloride 90 meq/Potassium Chloride 15 meq/ Potassium Phosphate 10 mmol/ 

Magnesium Sulfate 8 meq/Calcium Gluconate 15 meq/ Multivitamins 10 ml/Chromium/ 

Copper/Manganese/ Seleni/Zn 0.5 ml/ Insulin Human Regular 25 unit/ Total 

Parenteral Nutrition/Amino Acids/Dextrose/ Fat Emulsion Intravenous 1,400 ml @  

58.333 mls/ hr TPN  CONT IV  Last administered on 4/3/20at 22:19;  Start 4/3/20 

at 22:00;  Stop 4/4/20 at 21:59;  Status DC


Heparin Sodium (Porcine) (Heparin Sodium) 5,000 unit Q12HR SQ  Last administered

on 4/26/20at 08:59;  Start 4/3/20 at 21:00;  Stop 4/26/20 at 10:05;  Status DC


Ondansetron HCl (Zofran) 4 mg PRN Q6HRS  PRN IV NAUSEA/VOMITING;  Start 4/6/20 

at 07:00;  Stop 4/7/20 at 06:59;  Status DC


Fentanyl Citrate (Fentanyl 2ml Vial) 25 mcg PRN Q5MIN  PRN IV MILD PAIN 1-3;  

Start 4/6/20 at 07:00;  Stop 4/7/20 at 06:59;  Status DC


Fentanyl Citrate (Fentanyl 2ml Vial) 50 mcg PRN Q5MIN  PRN IV MODERATE TO SEVERE

PAIN;  Start 4/6/20 at 07:00;  Stop 4/7/20 at 06:59;  Status DC


Ringer's Solution 1,000 ml @  30 mls/hr Q24H IV ;  Start 4/6/20 at 07:00;  Stop 

4/6/20 at 18:59;  Status DC


Lidocaine HCl (Xylocaine-Mpf 1% 2ml Vial) 2 ml PRN 1X  PRN ID PRIOR TO IV START;

 Start 4/6/20 at 07:00;  Stop 4/7/20 at 06:59;  Status DC


Prochlorperazine Edisylate (Compazine) 5 mg PACU PRN  PRN IV NAUSEA, MRX1;  

Start 4/6/20 at 07:00;  Stop 4/7/20 at 06:59;  Status DC


Sodium Chloride 1,000 ml @  1,000 mls/hr Q1H PRN IV hypotension;  Start 4/4/20 

at 09:10;  Stop 4/4/20 at 15:09;  Status DC


Albumin Human 200 ml @  200 mls/hr 1X PRN  PRN IV Hypotension Last administered 

on 4/4/20at 10:10;  Start 4/4/20 at 09:15;  Stop 4/4/20 at 15:14;  Status DC


Sodium Chloride 1,000 ml @  400 mls/hr Q2H30M PRN IV PATENCY;  Start 4/4/20 at 

09:10;  Stop 4/4/20 at 21:09;  Status DC


Info (PHARMACY MONITORING -- do not chart) 1 each PRN DAILY  PRN MC SEE COM

MENTS;  Start 4/4/20 at 09:15;  Stop 4/5/20 at 12:45;  Status DC


Info (PHARMACY MONITORING -- do not chart) 1 each PRN DAILY  PRN MC SEE 

COMMENTS;  Start 4/4/20 at 09:15;  Stop 4/5/20 at 12:45;  Status DC


Sodium Chloride 90 meq/Potassium Chloride 15 meq/ Potassium Phosphate 10 mmol/ 

Magnesium Sulfate 8 meq/Calcium Gluconate 15 meq/ Multivitamins 10 ml/Chromium/ 

Copper/Manganese/ Seleni/Zn 0.5 ml/ Insulin Human Regular 25 unit/ Total 

Parenteral Nutrition/Amino Acids/Dextrose/ Fat Emulsion Intravenous 1,400 ml @  

58.333 mls/ hr TPN  CONT IV  Last administered on 4/4/20at 22:10;  Start 4/4/20 

at 22:00;  Stop 4/5/20 at 21:59;  Status DC


Magnesium Sulfate 50 ml @ 25 mls/hr PRN DAILY  PRN IV for Mag < 1.7 on am labs 

Last administered on 4/20/20at 17:27;  Start 4/5/20 at 09:15


Sodium Chloride 90 meq/Potassium Chloride 15 meq/ Potassium Phosphate 10 mmol/ 

Magnesium Sulfate 8 meq/Calcium Gluconate 15 meq/ Multivitamins 10 ml/Chromium/ 

Copper/Manganese/ Seleni/Zn 0.5 ml/ Insulin Human Regular 25 unit/ Total 

Parenteral Nutrition/Amino Acids/Dextrose/ Fat Emulsion Intravenous 1,400 ml @  

58.333 mls/ hr TPN  CONT IV  Last administered on 4/5/20at 21:20;  Start 4/5/20 

at 22:00;  Stop 4/6/20 at 21:59;  Status DC


Sodium Chloride 1,000 ml @  1,000 mls/hr Q1H PRN IV hypotension;  Start 4/5/20 

at 12:23;  Stop 4/5/20 at 18:22;  Status DC


Albumin Human 200 ml @  200 mls/hr 1X  ONCE IV  Last administered on 4/5/20at 

13:34;  Start 4/5/20 at 12:30;  Stop 4/5/20 at 13:29;  Status DC


Diphenhydramine HCl (Benadryl) 25 mg 1X PRN  PRN IV ITCHING;  Start 4/5/20 at 

12:30;  Stop 4/6/20 at 12:29;  Status DC


Diphenhydramine HCl (Benadryl) 25 mg 1X PRN  PRN IV ITCHING;  Start 4/5/20 at 

12:30;  Stop 4/6/20 at 12:29;  Status DC


Info (PHARMACY MONITORING -- do not chart) 1 each PRN DAILY  PRN MC SEE 

COMMENTS;  Start 4/5/20 at 12:30;  Status Cancel


Bupivacaine HCl/ Epinephrine Bitart (Sensorcain-Epi 0.5%-1:062129 Mpf) 30 ml S

TK-MED ONCE .ROUTE  Last administered on 4/6/20at 11:44;  Start 4/6/20 at 11:00;

 Stop 4/6/20 at 11:01;  Status DC


Cellulose (Surgicel Fibrillar 1x2) 1 each STK-MED ONCE .ROUTE ;  Start 4/6/20 at

11:00;  Stop 4/6/20 at 11:01;  Status DC


Sodium Chloride 90 meq/Potassium Chloride 15 meq/ Potassium Phosphate 10 mmol/ 

Magnesium Sulfate 12 meq/Calcium Gluconate 15 meq/ Multivitamins 10 ml/Chromium/

Copper/Manganese/ Seleni/Zn 0.5 ml/ Insulin Human Regular 25 unit/ Total 

Parenteral Nutrition/Amino Acids/Dextrose/ Fat Emulsion Intravenous 1,400 ml @  

58.333 mls/ hr TPN  CONT IV  Last administered on 4/6/20at 22:24;  Start 4/6/20 

at 22:00;  Stop 4/7/20 at 21:59;  Status DC


Propofol 20 ml @ As Directed STK-MED ONCE IV ;  Start 4/6/20 at 11:07;  Stop 

4/6/20 at 11:07;  Status DC


Cellulose (Surgicel Hemostat 4x8) 1 each STK-MED ONCE .ROUTE  Last administered 

on 4/6/20at 11:44;  Start 4/6/20 at 11:55;  Stop 4/6/20 at 11:56;  Status DC


Sevoflurane (Ultane) 60 ml STK-MED ONCE IH ;  Start 4/6/20 at 12:46;  Stop 

4/6/20 at 12:46;  Status DC


Sodium Chloride 1,000 ml @  1,000 mls/hr Q1H PRN IV hypotension;  Start 4/6/20 

at 13:51;  Stop 4/6/20 at 19:50;  Status DC


Albumin Human 200 ml @  200 mls/hr 1X PRN  PRN IV Hypotension Last administered 

on 4/6/20at 14:51;  Start 4/6/20 at 14:00;  Stop 4/6/20 at 19:59;  Status DC


Diphenhydramine HCl (Benadryl) 25 mg 1X PRN  PRN IV ITCHING;  Start 4/6/20 at 

14:00;  Stop 4/7/20 at 13:59;  Status DC


Diphenhydramine HCl (Benadryl) 25 mg 1X PRN  PRN IV ITCHING;  Start 4/6/20 at 

14:00;  Stop 4/7/20 at 13:59;  Status DC


Sodium Chloride 1,000 ml @  400 mls/hr Q2H30M PRN IV PATENCY;  Start 4/6/20 at 

13:51;  Stop 4/7/20 at 01:50;  Status DC


Info (PHARMACY MONITORING -- do not chart) 1 each PRN DAILY  PRN MC SEE 

COMMENTS;  Start 4/6/20 at 14:00;  Stop 4/9/20 at 08:16;  Status DC


Heparin Sodium (Porcine) (Hep Lock Adult) 500 unit STK-MED ONCE IVP ;  Start 

4/7/20 at 09:29;  Stop 4/7/20 at 09:30;  Status DC


Sodium Chloride 1,000 ml @  1,000 mls/hr Q1H PRN IV hypotension;  Start 4/7/20 

at 10:43;  Stop 4/7/20 at 16:42;  Status DC


Sodium Chloride 1,000 ml @  400 mls/hr Q2H30M PRN IV PATENCY;  Start 4/7/20 at 

10:43;  Stop 4/7/20 at 22:42;  Status DC


Info (PHARMACY MONITORING -- do not chart) 1 each PRN DAILY  PRN MC SEE 

COMMENTS;  Start 4/7/20 at 10:45;  Status UNV


Info (PHARMACY MONITORING -- do not chart) 1 each PRN DAILY  PRN MC SEE 

COMMENTS;  Start 4/7/20 at 10:45;  Status UNV


Sodium Chloride 90 meq/Potassium Chloride 15 meq/ Magnesium Sulfate 12 

meq/Calcium Gluconate 15 meq/ Multivitamins 10 ml/Chromium/ Copper/Manganese/ 

Seleni/Zn 0.5 ml/ Insulin Human Regular 25 unit/ Total Parenteral 

Nutrition/Amino Acids/Dextrose/ Fat Emulsion Intravenous 1,400 ml @  58.333 mls/

hr TPN  CONT IV  Last administered on 4/7/20at 22:13;  Start 4/7/20 at 22:00;  

Stop 4/8/20 at 21:59;  Status DC


Sodium Chloride 1,000 ml @  1,000 mls/hr Q1H PRN IV hypotension;  Start 4/8/20 

at 07:50;  Stop 4/8/20 at 13:49;  Status DC


Albumin Human 200 ml @  200 mls/hr 1X  ONCE IV ;  Start 4/8/20 at 08:00;  Stop 

4/8/20 at 08:53;  Status DC


Diphenhydramine HCl (Benadryl) 25 mg 1X PRN  PRN IV ITCHING;  Start 4/8/20 at 

08:00;  Stop 4/9/20 at 07:59;  Status DC


Diphenhydramine HCl (Benadryl) 25 mg 1X PRN  PRN IV ITCHING;  Start 4/8/20 at 

08:00;  Stop 4/9/20 at 07:59;  Status DC


Info (PHARMACY MONITORING -- do not chart) 1 each PRN DAILY  PRN MC SEE 

COMMENTS;  Start 4/8/20 at 08:00;  Stop 4/9/20 at 08:16;  Status DC


Albumin Human 50 ml @ 50 mls/hr 1X  ONCE IV ;  Start 4/8/20 at 08:53;  Stop 

4/8/20 at 08:56;  Status DC


Albumin Human 200 ml @  50 mls/hr PRN 1X  PRN IV HYPOTENSION Last administered 

on 4/14/20at 11:54;  Start 4/8/20 at 09:00;  Stop 5/21/20 at 11:14;  Status DC


Meropenem 500 mg/ Sodium Chloride 50 ml @  100 mls/hr Q12H IV  Last administered

on 4/28/20at 10:45;  Start 4/8/20 at 10:00;  Stop 4/28/20 at 12:37;  Status DC


Sodium Chloride 90 meq/Magnesium Sulfate 12 meq/ Calcium Gluconate 15 meq/ 

Multivitamins 10 ml/Chromium/ Copper/Manganese/ Seleni/Zn 0.5 ml/ Insulin Human 

Regular 25 unit/ Total Parenteral Nutrition/Amino Acids/Dextrose/ Fat Emulsion 

Intravenous 1,400 ml @  58.333 mls/ hr TPN  CONT IV  Last administered on 

4/8/20at 21:41;  Start 4/8/20 at 22:00;  Stop 4/9/20 at 21:59;  Status DC


Sodium Chloride 1,000 ml @  1,000 mls/hr Q1H PRN IV hypotension;  Start 4/9/20 

at 07:58;  Stop 4/9/20 at 13:57;  Status DC


Albumin Human 200 ml @  200 mls/hr 1X PRN  PRN IV Hypotension Last administered 

on 4/9/20at 09:30;  Start 4/9/20 at 08:00;  Stop 4/9/20 at 13:59;  Status DC


Sodium Chloride 1,000 ml @  400 mls/hr Q2H30M PRN IV PATENCY;  Start 4/9/20 at 

07:58;  Stop 4/9/20 at 19:57;  Status DC


Info (PHARMACY MONITORING -- do not chart) 1 each PRN DAILY  PRN MC SEE 

COMMENTS;  Start 4/9/20 at 08:00;  Status Cancel


Info (PHARMACY MONITORING -- do not chart) 1 each PRN DAILY  PRN MC SEE 

COMMENTS;  Start 4/9/20 at 08:15;  Status UNV


Sodium Chloride 90 meq/Potassium Phosphate 5 mmol/ Magnesium Sulfate 12 

meq/Calcium Gluconate 15 meq/ Multivitamins 10 ml/Chromium/ Copper/Manganese/ 

Seleni/Zn 0.5 ml/ Insulin Human Regular 30 unit/ Total Parenteral 

Nutrition/Amino Acids/Dextrose/ Fat Emulsion Intravenous 1,400 ml @  58.333 mls/

hr TPN  CONT IV  Last administered on 4/9/20at 22:08;  Start 4/9/20 at 22:00;  

Stop 4/10/20 at 21:59;  Status DC


Linezolid/Dextrose 300 ml @  300 mls/hr Q12HR IV  Last administered on 4/20/20at

20:40;  Start 4/10/20 at 11:00;  Stop 4/21/20 at 08:10;  Status DC


Sodium Chloride 90 meq/Potassium Phosphate 15 mmol/ Magnesium Sulfate 12 

meq/Calcium Gluconate 15 meq/ Multivitamins 10 ml/Chromium/ Copper/Manganese/ 

Seleni/Zn 0.5 ml/ Insulin Human Regular 30 unit/ Total Parenteral 

Nutrition/Amino Acids/Dextrose/ Fat Emulsion Intravenous 1,400 ml @  58.333 mls/

hr TPN  CONT IV  Last administered on 4/10/20at 21:49;  Start 4/10/20 at 22:00; 

Stop 4/11/20 at 21:59;  Status DC


Sodium Chloride 90 meq/Potassium Phosphate 15 mmol/ Magnesium Sulfate 12 

meq/Calcium Gluconate 15 meq/ Multivitamins 10 ml/Chromium/ Copper/Manganese/ 

Seleni/Zn 0.5 ml/ Insulin Human Regular 40 unit/ Total Parenteral 

Nutrition/Amino Acids/Dextrose/ Fat Emulsion Intravenous 1,400 ml @  58.333 mls/

hr TPN  CONT IV  Last administered on 4/11/20at 21:21;  Start 4/11/20 at 22:00; 

Stop 4/12/20 at 21:59;  Status DC


Sodium Chloride 1,000 ml @  1,000 mls/hr Q1H PRN IV hypotension;  Start 4/11/20 

at 13:26;  Stop 4/11/20 at 19:25;  Status DC


Albumin Human 200 ml @  200 mls/hr 1X PRN  PRN IV Hypotension Last administered 

on 4/11/20at 15:00;  Start 4/11/20 at 13:30;  Stop 4/11/20 at 19:29;  Status DC


Sodium Chloride (Normal Saline Flush) 10 ml 1X PRN  PRN IV AP catheter pack;  

Start 4/11/20 at 13:30;  Stop 4/12/20 at 13:29;  Status DC


Sodium Chloride (Normal Saline Flush) 10 ml 1X PRN  PRN IV  catheter pack;  

Start 4/11/20 at 13:30;  Stop 4/12/20 at 13:29;  Status DC


Sodium Chloride 1,000 ml @  400 mls/hr Q2H30M PRN IV PATENCY;  Start 4/11/20 at 

13:26;  Stop 4/12/20 at 01:25;  Status DC


Info (PHARMACY MONITORING -- do not chart) 1 each PRN DAILY  PRN MC SEE 

COMMENTS;  Start 4/11/20 at 13:30;  Stop 4/11/20 at 13:33;  Status DC


Info (PHARMACY MONITORING -- do not chart) 1 each PRN DAILY  PRN MC SEE MIKEY

TS;  Start 4/11/20 at 13:30;  Stop 4/11/20 at 13:34;  Status DC


Sodium Chloride 90 meq/Potassium Phosphate 19 mmol/ Magnesium Sulfate 12 

meq/Calcium Gluconate 15 meq/ Multivitamins 10 ml/Chromium/ Copper/Manganese/ 

Seleni/Zn 0.5 ml/ Insulin Human Regular 40 unit/ Total Parenteral 

Nutrition/Amino Acids/Dextrose/ Fat Emulsion Intravenous 1,400 ml @  58.333 mls/

hr TPN  CONT IV  Last administered on 4/12/20at 21:54;  Start 4/12/20 at 22:00; 

Stop 4/13/20 at 21:59;  Status DC


Sodium Chloride 1,000 ml @  1,000 mls/hr Q1H PRN IV hypotension;  Start 4/13/20 

at 09:35;  Stop 4/13/20 at 15:34;  Status DC


Albumin Human 200 ml @  200 mls/hr 1X PRN  PRN IV Hypotension;  Start 4/13/20 at

09:45;  Stop 4/13/20 at 15:44;  Status DC


Diphenhydramine HCl (Benadryl) 25 mg 1X PRN  PRN IV ITCHING;  Start 4/13/20 at 

09:45;  Stop 4/14/20 at 09:44;  Status DC


Diphenhydramine HCl (Benadryl) 25 mg 1X PRN  PRN IV ITCHING;  Start 4/13/20 at 

09:45;  Stop 4/14/20 at 09:44;  Status DC


Sodium Chloride 1,000 ml @  400 mls/hr Q2H30M PRN IV PATENCY;  Start 4/13/20 at 

09:35;  Stop 4/13/20 at 21:34;  Status DC


Info (PHARMACY MONITORING -- do not chart) 1 each PRN DAILY  PRN MC SEE 

COMMENTS;  Start 4/13/20 at 09:45;  Status Cancel


Sodium Chloride 100 meq/Potassium Phosphate 19 mmol/ Magnesium Sulfate 12 

meq/Calcium Gluconate 15 meq/ Multivitamins 10 ml/Chromium/ Copper/Manganese/ 

Seleni/Zn 0.5 ml/ Insulin Human Regular 40 unit/ Potassium Chloride 20 meq/ 

Total Parenteral Nutrition/Amino Acids/Dextrose/ Fat Emulsion Intravenous 1,400 

ml @  58.333 mls/ hr TPN  CONT IV  Last administered on 4/13/20at 22:02;  Start 

4/13/20 at 22:00;  Stop 4/14/20 at 21:59;  Status DC


Furosemide (Lasix) 40 mg 1X  ONCE IVP  Last administered on 4/13/20at 14:39;  

Start 4/13/20 at 14:30;  Stop 4/13/20 at 14:31;  Status DC


Metronidazole 100 ml @  100 mls/hr Q8HRS IV  Last administered on 4/21/20at 

06:04;  Start 4/14/20 at 10:00;  Stop 4/21/20 at 08:10;  Status DC


Sodium Chloride 1,000 ml @  1,000 mls/hr Q1H PRN IV hypotension;  Start 4/14/20 

at 08:00;  Stop 4/14/20 at 13:59;  Status DC


Albumin Human 200 ml @  200 mls/hr 1X PRN  PRN IV Hypotension;  Start 4/14/20 at

08:00;  Stop 4/14/20 at 13:59;  Status DC


Sodium Chloride 1,000 ml @  400 mls/hr Q2H30M PRN IV PATENCY;  Start 4/14/20 at 

08:00;  Stop 4/14/20 at 19:59;  Status DC


Info (PHARMACY MONITORING -- do not chart) 1 each PRN DAILY  PRN MC SEE 

COMMENTS;  Start 4/14/20 at 11:30;  Status UNV


Info (PHARMACY MONITORING -- do not chart) 1 each PRN DAILY  PRN MC SEE 

COMMENTS;  Start 4/14/20 at 11:30;  Stop 4/16/20 at 12:13;  Status DC


Sodium Chloride 100 meq/Potassium Phosphate 19 mmol/ Magnesium Sulfate 12 

meq/Calcium Gluconate 15 meq/ Multivitamins 10 ml/Chromium/ Copper/Manganese/ 

Seleni/Zn 0.5 ml/ Insulin Human Regular 40 unit/ Potassium Chloride 20 meq/ 

Total Parenteral Nutrition/Amino Acids/Dextrose/ Fat Emulsion Intravenous 1,400 

ml @  58.333 mls/ hr TPN  CONT IV  Last administered on 4/14/20at 21:52;  Start 

4/14/20 at 22:00;  Stop 4/15/20 at 21:59;  Status DC


Sodium Chloride (Normal Saline Flush) 10 ml QSHIFT  PRN IV AFTER MEDS AND BLOOD 

DRAWS;  Start 4/14/20 at 15:00;  Stop 5/12/20 at 11:27;  Status DC


Sodium Chloride (Normal Saline Flush) 10 ml PRN Q5MIN  PRN IV AFTER MEDS AND 

BLOOD DRAWS;  Start 4/14/20 at 15:00


Sodium Chloride (Normal Saline Flush) 20 ml PRN Q5MIN  PRN IV AFTER MEDS AND 

BLOOD DRAWS;  Start 4/14/20 at 15:00


Sodium Chloride 100 meq/Potassium Phosphate 19 mmol/ Magnesium Sulfate 12 meq

/Calcium Gluconate 15 meq/ Multivitamins 10 ml/Chromium/ Copper/Manganese/ 

Seleni/Zn 0.5 ml/ Insulin Human Regular 40 unit/ Potassium Chloride 20 meq/ 

Total Parenteral Nutrition/Amino Acids/Dextrose/ Fat Emulsion Intravenous 1,400 

ml @  58.333 mls/ hr TPN  CONT IV  Last administered on 4/15/20at 21:20;  Start 

4/15/20 at 22:00;  Stop 4/16/20 at 21:59;  Status DC


Lidocaine HCl (Buffered Lidocaine 1%) 3 ml STK-MED ONCE .ROUTE ;  Start 4/15/20 

at 13:16;  Stop 4/15/20 at 13:16;  Status DC


Lidocaine HCl (Buffered Lidocaine 1%) 6 ml 1X  ONCE INJ  Last administered on 

4/15/20at 13:45;  Start 4/15/20 at 13:30;  Stop 4/15/20 at 13:31;  Status DC


Albumin Human 100 ml @  100 mls/hr 1X  ONCE IV  Last administered on 4/15/20at 

15:41;  Start 4/15/20 at 15:00;  Stop 4/15/20 at 15:59;  Status DC


Albumin Human 50 ml @ 50 mls/hr 1X  ONCE IV  Last administered on 4/15/20at 

15:00;  Start 4/15/20 at 15:00;  Stop 4/15/20 at 15:59;  Status DC


Info (PHARMACY MONITORING -- do not chart) 1 each PRN DAILY  PRN MC SEE 

COMMENTS;  Start 4/16/20 at 11:30;  Status Cancel


Info (PHARMACY MONITORING -- do not chart) 1 each PRN DAILY  PRN MC SEE 

COMMENTS;  Start 4/16/20 at 11:30;  Status UNV


Sodium Chloride 100 meq/Potassium Phosphate 10 mmol/ Magnesium Sulfate 12 

meq/Calcium Gluconate 15 meq/ Multivitamins 10 ml/Chromium/ Copper/Manganese/ 

Seleni/Zn 0.5 ml/ Insulin Human Regular 35 unit/ Potassium Chloride 20 meq/ 

Total Parenteral Nutrition/Amino Acids/Dextrose/ Fat Emulsion Intravenous 1,400 

ml @  58.333 mls/ hr TPN  CONT IV  Last administered on 4/16/20at 22:10;  Start 

4/16/20 at 22:00;  Stop 4/17/20 at 21:59;  Status DC


Sodium Chloride 100 meq/Potassium Phosphate 5 mmol/ Magnesium Sulfate 12 

meq/Calcium Gluconate 15 meq/ Multivitamins 10 ml/Chromium/ Copper/Manganese/ 

Seleni/Zn 0.5 ml/ Insulin Human Regular 35 unit/ Potassium Chloride 20 meq/ 

Total Parenteral Nutrition/Amino Acids/Dextrose/ Fat Emulsion Intravenous 1,400 

ml @  58.333 mls/ hr TPN  CONT IV  Last administered on 4/17/20at 22:59;  Start 

4/17/20 at 22:00;  Stop 4/18/20 at 21:59;  Status DC


Sodium Chloride 1,000 ml @  1,000 mls/hr Q1H PRN IV hypotension;  Start 4/18/20 

at 08:27;  Stop 4/18/20 at 14:26;  Status DC


Albumin Human 200 ml @  200 mls/hr 1X PRN  PRN IV Hypotension Last administered 

on 4/18/20at 09:18;  Start 4/18/20 at 08:30;  Stop 4/18/20 at 14:29;  Status DC


Sodium Chloride 1,000 ml @  400 mls/hr Q2H30M PRN IV PATENCY;  Start 4/18/20 at 

08:27;  Stop 4/18/20 at 20:26;  Status DC


Info (PHARMACY MONITORING -- do not chart) 1 each PRN DAILY  PRN MC SEE 

COMMENTS;  Start 4/18/20 at 08:30;  Status Cancel


Info (PHARMACY MONITORING -- do not chart) 1 each PRN DAILY  PRN MC SEE 

COMMENTS;  Start 4/18/20 at 08:30;  Stop 4/26/20 at 13:10;  Status DC


Sodium Chloride 100 meq/Potassium Chloride 40 meq/ Magnesium Sulfate 15 

meq/Calcium Gluconate 15 meq/ Multivitamins 10 ml/Chromium/ Copper/Manganese/ 

Seleni/Zn 0.5 ml/ Insulin Human Regular 35 unit/ Total Parenteral Nu

trition/Amino Acids/Dextrose/ Fat Emulsion Intravenous 1,400 ml @  58.333 mls/ 

hr TPN  CONT IV  Last administered on 4/18/20at 22:00;  Start 4/18/20 at 22:00; 

Stop 4/19/20 at 21:59;  Status DC


Potassium Chloride/Water 100 ml @  100 mls/hr 1X  ONCE IV  Last administered on 

4/18/20at 17:28;  Start 4/18/20 at 14:45;  Stop 4/18/20 at 15:44;  Status DC


Sodium Chloride 100 meq/Potassium Chloride 40 meq/ Magnesium Sulfate 15 

meq/Calcium Gluconate 15 meq/ Multivitamins 10 ml/Chromium/ Copper/Manganese/ 

Seleni/Zn 0.5 ml/ Insulin Human Regular 35 unit/ Total Parenteral 

Nutrition/Amino Acids/Dextrose/ Fat Emulsion Intravenous 1,400 ml @  58.333 mls/

hr TPN  CONT IV  Last administered on 4/19/20at 22:46;  Start 4/19/20 at 22:00; 

Stop 4/20/20 at 21:59;  Status DC


Sodium Chloride 100 meq/Potassium Chloride 40 meq/ Magnesium Sulfate 20 

meq/Calcium Gluconate 15 meq/ Multivitamins 10 ml/Chromium/ Copper/Manganese/ 

Seleni/Zn 0.5 ml/ Insulin Human Regular 35 unit/ Total Parenteral 

Nutrition/Amino Acids/Dextrose/ Fat Emulsion Intravenous 1,400 ml @  58.333 mls/

hr TPN  CONT IV  Last administered on 4/20/20at 22:31;  Start 4/20/20 at 22:00; 

Stop 4/21/20 at 21:59;  Status DC


Fentanyl Citrate (Fentanyl 2ml Vial) 50 mcg PRN Q2HR  PRN IVP PAIN Last 

administered on 4/27/20at 13:32;  Start 4/20/20 at 21:00;  Stop 4/28/20 at 

12:53;  Status DC


Fentanyl Citrate (Fentanyl 2ml Vial) 25 mcg PRN Q2HR  PRN IVP PAIN;  Start 

4/20/20 at 21:00;  Stop 4/28/20 at 12:54;  Status DC


Enoxaparin Sodium (Lovenox 100mg Syringe) 100 mg Q12HR SQ ;  Start 4/21/20 at 

21:00;  Status UNV


Amino Acids/ Glycerin/ Electrolytes 1,000 ml @  75 mls/hr [id number] IV ;  Start 

4/20/20 at 21:15;  Status UNV


Sodium Chloride 1,000 ml @  1,000 mls/hr Q1H PRN IV hypotension;  Start 4/21/20 

at 07:56;  Stop 4/21/20 at 13:55;  Status DC


Albumin Human 200 ml @  200 mls/hr 1X PRN  PRN IV Hypotension Last administered 

on 4/21/20at 08:40;  Start 4/21/20 at 08:00;  Stop 4/21/20 at 13:59;  Status DC


Sodium Chloride 1,000 ml @  400 mls/hr Q2H30M PRN IV PATENCY;  Start 4/21/20 at 

07:56;  Stop 4/21/20 at 19:55;  Status DC


Info (PHARMACY MONITORING -- do not chart) 1 each PRN DAILY  PRN MC SEE 

COMMENTS;  Start 4/21/20 at 08:00;  Status UNV


Info (PHARMACY MONITORING -- do not chart) 1 each PRN DAILY  PRN MC SEE 

COMMENTS;  Start 4/21/20 at 08:00;  Status UNV


Daptomycin 430 mg/ Sodium Chloride 50 ml @  100 mls/hr Q24H IV  Last 

administered on 4/21/20at 12:35;  Start 4/21/20 at 09:00;  Stop 4/21/20 at 

12:49;  Status DC


Sodium Chloride 100 meq/Potassium Chloride 40 meq/ Magnesium Sulfate 20 meq/Calc

ium Gluconate 15 meq/ Multivitamins 10 ml/Chromium/ Copper/Manganese/ Seleni/Zn 

0.5 ml/ Insulin Human Regular 35 unit/ Total Parenteral Nutrition/Amino 

Acids/Dextrose/ Fat Emulsion Intravenous 1,400 ml @  58.333 mls/ hr TPN  CONT IV

 Last administered on 4/21/20at 21:26;  Start 4/21/20 at 22:00;  Stop 4/22/20 at

21:59;  Status DC


Daptomycin 430 mg/ Sodium Chloride 50 ml @  100 mls/hr Q48H IV ;  Start 4/23/20 

at 09:00;  Stop 4/22/20 at 11:55;  Status DC


Sodium Chloride 100 meq/Potassium Chloride 40 meq/ Magnesium Sulfate 20 

meq/Calcium Gluconate 15 meq/ Multivitamins 10 ml/Chromium/ Copper/Manganese/ 

Seleni/Zn 0.5 ml/ Insulin Human Regular 35 unit/ Total Parenteral 

Nutrition/Amino Acids/Dextrose/ Fat Emulsion Intravenous 1,400 ml @  58.333 mls/

hr TPN  CONT IV  Last administered on 4/22/20at 22:27;  Start 4/22/20 at 22:00; 

Stop 4/23/20 at 21:59;  Status DC


Daptomycin 430 mg/ Sodium Chloride 50 ml @  100 mls/hr Q24H IV  Last 

administered on 4/24/20at 15:07;  Start 4/22/20 at 13:00;  Stop 4/25/20 at 

13:15;  Status DC


Sodium Chloride 100 meq/Potassium Chloride 40 meq/ Magnesium Sulfate 20 

meq/Calcium Gluconate 10 meq/ Multivitamins 10 ml/Chromium/ Copper/Manganese/ 

Seleni/Zn 0.5 ml/ Insulin Human Regular 35 unit/ Total Parenteral 

Nutrition/Amino Acids/Dextrose/ Fat Emulsion Intravenous 1,400 ml @  58.333 mls/

hr TPN  CONT IV  Last administered on 4/24/20at 00:06;  Start 4/23/20 at 22:00; 

Stop 4/24/20 at 21:59;  Status DC


Alteplase, Recombinant (Cathflo For Central Catheter Clearance) 1 mg 1X  ONCE 

INT CAT  Last administered on 4/24/20at 11:44;  Start 4/24/20 at 10:45;  Stop 

4/24/20 at 10:46;  Status DC


Ondansetron HCl (Zofran) 4 mg PRN Q6HRS  PRN IV NAUSEA/VOMITING;  Start 4/27/20 

at 07:00;  Stop 4/28/20 at 06:59;  Status DC


Fentanyl Citrate (Fentanyl 2ml Vial) 25 mcg PRN Q5MIN  PRN IV MILD PAIN 1-3;  

Start 4/27/20 at 07:00;  Stop 4/28/20 at 06:59;  Status DC


Fentanyl Citrate (Fentanyl 2ml Vial) 50 mcg PRN Q5MIN  PRN IV MODERATE TO SEVERE

PAIN Last administered on 4/27/20at 10:17;  Start 4/27/20 at 07:00;  Stop 4/2 8/20 at 06:59;  Status DC


Ringer's Solution 1,000 ml @  30 mls/hr Q24H IV ;  Start 4/27/20 at 07:00;  Stop

4/27/20 at 18:59;  Status DC


Lidocaine HCl (Xylocaine-Mpf 1% 2ml Vial) 2 ml PRN 1X  PRN ID PRIOR TO IV START;

 Start 4/27/20 at 07:00;  Stop 4/28/20 at 06:59;  Status DC


Prochlorperazine Edisylate (Compazine) 5 mg PACU PRN  PRN IV NAUSEA, MRX1;  

Start 4/27/20 at 07:00;  Stop 4/28/20 at 06:59;  Status DC


Sodium Acetate 50 meq/Potassium Acetate 55 meq/ Magnesium Sulfate 20 meq/Calcium

Gluconate 10 meq/ Multivitamins 10 ml/Chromium/ Copper/Manganese/ Seleni/Zn 0.5 

ml/ Insulin Human Regular 35 unit/ Total Parenteral Nutrition/Amino 

Acids/Dextrose/ Fat Emulsion Intravenous 1,400 ml @  58.333 mls/ hr TPN  CONT IV

;  Start 4/24/20 at 22:00;  Stop 4/24/20 at 14:15;  Status DC


Sodium Acetate 50 meq/Potassium Acetate 55 meq/ Magnesium Sulfate 20 meq/Calcium

Gluconate 10 meq/ Multivitamins 10 ml/Chromium/ Copper/Manganese/ Seleni/Zn 0.5 

ml/ Insulin Human Regular 35 unit/ Total Parenteral Nutrition/Amino Acids/Dextr

ose/ Fat Emulsion Intravenous 1,800 ml @  75 mls/hr TPN  CONT IV  Last 

administered on 4/24/20at 22:38;  Start 4/24/20 at 22:00;  Stop 4/25/20 at 

21:59;  Status DC


Sodium Chloride 1,000 ml @  1,000 mls/hr Q1H PRN IV hypotension;  Start 4/24/20 

at 15:31;  Stop 4/24/20 at 21:30;  Status DC


Diphenhydramine HCl (Benadryl) 25 mg 1X PRN  PRN IV ITCHING;  Start 4/24/20 at 

15:45;  Stop 4/25/20 at 15:44;  Status DC


Diphenhydramine HCl (Benadryl) 25 mg 1X PRN  PRN IV ITCHING;  Start 4/24/20 at 

15:45;  Stop 4/25/20 at 15:44;  Status DC


Sodium Chloride 1,000 ml @  400 mls/hr Q2H30M PRN IV PATENCY;  Start 4/24/20 at 

15:31;  Stop 4/25/20 at 03:30;  Status DC


Info (PHARMACY MONITORING -- do not chart) 1 each PRN DAILY  PRN MC SEE 

COMMENTS;  Start 4/24/20 at 15:45;  Stop 5/26/20 at 14:14;  Status DC


Sodium Acetate 50 meq/Potassium Acetate 55 meq/ Magnesium Sulfate 20 meq/Calcium

Gluconate 10 meq/ Multivitamins 10 ml/Chromium/ Copper/Manganese/ Seleni/Zn 0.5 

ml/ Insulin Human Regular 35 unit/ Total Parenteral Nutrition/Amino 

Acids/Dextrose/ Fat Emulsion Intravenous 1,800 ml @  75 mls/hr TPN  CONT IV  

Last administered on 4/25/20at 22:03;  Start 4/25/20 at 22:00;  Stop 4/26/20 at 

21:59;  Status DC


Daptomycin 430 mg/ Sodium Chloride 50 ml @  100 mls/hr Q24H IV  Last 

administered on 4/30/20at 13:00;  Start 4/25/20 at 13:00;  Stop 4/30/20 at 

20:58;  Status DC


Heparin Sodium (Porcine) 1000 unit/Sodium Chloride 1,001 ml @  1,001 mls/hr 1X  

ONCE IRR ;  Start 4/27/20 at 06:00;  Stop 4/27/20 at 06:59;  Status DC


Potassium Acetate 55 meq/Magnesium Sulfate 20 meq/ Calcium Gluconate 10 meq/ 

Multivitamins 10 ml/Chromium/ Copper/Manganese/ Seleni/Zn 0.5 ml/ Insulin Human 

Regular 35 unit/ Total Parenteral Nutrition/Amino Acids/Dextrose/ Fat Emulsion 

Intravenous 1,920 ml @  80 mls/hr TPN  CONT IV  Last administered on 4/26/20at 

22:10;  Start 4/26/20 at 22:00;  Stop 4/27/20 at 21:59;  Status DC


Dexamethasone Sodium Phosphate (Decadron) 4 mg STK-MED ONCE .ROUTE ;  Start 

4/27/20 at 10:56;  Stop 4/27/20 at 10:57;  Status DC


Ondansetron HCl (Zofran) 4 mg STK-MED ONCE .ROUTE ;  Start 4/27/20 at 10:56;  

Stop 4/27/20 at 10:57;  Status DC


Rocuronium Bromide (Zemuron) 50 mg STK-MED ONCE .ROUTE ;  Start 4/27/20 at 

10:56;  Stop 4/27/20 at 10:57;  Status DC


Fentanyl Citrate (Fentanyl 2ml Vial) 100 mcg STK-MED ONCE .ROUTE ;  Start 

4/27/20 at 10:56;  Stop 4/27/20 at 10:57;  Status DC


Bupivacaine HCl/ Epinephrine Bitart (Sensorcain-Epi 0.5%-1:[id number] Mpf) 30 ml 

STK-MED ONCE .ROUTE  Last administered on 4/27/20at 12:01;  Start 4/27/20 at 

10:58;  Stop 4/27/20 at 10:58;  Status DC


Cellulose (Surgicel Hemostat 2x14) 1 each STK-MED ONCE .ROUTE ;  Start 4/27/20 

at 10:58;  Stop 4/27/20 at 10:59;  Status DC


Iohexol (Omnipaque 300 Mg/ml) 50 ml STK-MED ONCE .ROUTE ;  Start 4/27/20 at 

10:58;  Stop 4/27/20 at 10:59;  Status DC


Cellulose (Surgicel Hemostat 4x8) 1 each STK-MED ONCE .ROUTE ;  Start 4/27/20 at

10:58;  Stop 4/27/20 at 10:59;  Status DC


Bisacodyl (Dulcolax Supp) 10 mg STK-MED ONCE .ROUTE ;  Start 4/27/20 at 10:59;  

Stop 4/27/20 at 10:59;  Status DC


Heparin Sodium (Porcine) 1000 unit/Sodium Chloride 1,001 ml @  1,001 mls/hr 1X  

ONCE IRR ;  Start 4/27/20 at 12:00;  Stop 4/27/20 at 12:59;  Status DC


Propofol 20 ml @ As Directed STK-MED ONCE IV ;  Start 4/27/20 at 11:05;  Stop 

4/27/20 at 11:05;  Status DC


Sevoflurane (Ultane) 90 ml STK-MED ONCE IH ;  Start 4/27/20 at 11:05;  Stop 

4/27/20 at 11:05;  Status DC


Sevoflurane (Ultane) 60 ml STK-MED ONCE IH ;  Start 4/27/20 at 12:26;  Stop 

4/27/20 at 12:27;  Status DC


Propofol 20 ml @ As Directed STK-MED ONCE IV ;  Start 4/27/20 at 12:26;  Stop 

4/27/20 at 12:27;  Status DC


Phenylephrine HCl (PHENYLEPHRINE in 0.9% NACL PF) 1 mg STK-MED ONCE IV ;  Start 

4/27/20 at 12:34;  Stop 4/27/20 at 12:34;  Status DC


Heparin Sodium (Porcine) (Heparin Sodium) 5,000 unit Q12HR SQ  Last administered

on 5/6/20at 20:57;  Start 4/27/20 at 21:00;  Stop 5/7/20 at 09:59;  Status DC


Sodium Chloride (Normal Saline Flush) 3 ml QSHIFT  PRN IV AFTER MEDS AND BLOOD 

DRAWS;  Start 4/27/20 at 13:45


Naloxone HCl (Narcan) 0.4 mg PRN Q2MIN  PRN IV SEE INSTRUCTIONS Last 

administered on 6/6/20at 15:15;  Start 4/27/20 at 13:45


Sodium Chloride 1,000 ml @  25 mls/hr Q24H IV  Last administered on 5/26/20at 

13:37;  Start 4/27/20 at 13:37;  Stop 5/29/20 at 13:09;  Status DC


Naloxone HCl (Narcan) 0.4 mg PRN Q2MIN  PRN IV SEE INSTRUCTIONS;  Start 4/27/20 

at 14:30;  Status UNV


Sodium Chloride 1,000 ml @  25 mls/hr Q24H IV ;  Start 4/27/20 at 14:30;  Status

UNV


Hydromorphone HCl 30 ml @ 0 mls/hr CONT PRN  PRN IV PER PROTOCOL Last 

administered on 5/2/20at 16:08;  Start 4/27/20 at 14:30;  Stop 5/4/20 at 08:55; 

Status DC


Potassium Acetate 55 meq/Magnesium Sulfate 20 meq/ Calcium Gluconate 10 meq/ 

Multivitamins 10 ml/Chromium/ Copper/Manganese/ Seleni/Zn 0.5 ml/ Insulin Human 

Regular 35 unit/ Total Parenteral Nutrition/Amino Acids/Dextrose/ Fat Emulsion 

Intravenous 1,920 ml @  80 mls/hr TPN  CONT IV  Last administered on 4/27/20at 

22:01;  Start 4/27/20 at 22:00;  Stop 4/28/20 at 21:59;  Status DC


Bumetanide (Bumex) 2 mg [id number] IV  Last administered on 5/1/20at 13:50;  Start 

4/28/20 at 14:00;  Stop 5/2/20 at 14:10;  Status DC


Meropenem 1 gm/ Sodium Chloride 100 ml @  200 mls/hr Q8HRS IV  Last administered

on 5/22/20at 05:53;  Start 4/28/20 at 14:00;  Stop 5/22/20 at 09:31;  Status DC


Potassium Acetate 55 meq/Magnesium Sulfate 20 meq/ Calcium Gluconate 10 meq/ 

Multivitamins 10 ml/Chromium/ Copper/Manganese/ Seleni/Zn 0.5 ml/ Insulin Human 

Regular 35 unit/ Total Parenteral Nutrition/Amino Acids/Dextrose/ Fat Emulsion 

Intravenous 1,920 ml @  80 mls/hr TPN  CONT IV  Last administered on 4/28/20at 

22:02;  Start 4/28/20 at 22:00;  Stop 4/29/20 at 21:59;  Status DC


Hydromorphone HCl (Dilaudid Standard PCA) 12 mg STK-MED ONCE IV ;  Start 4/27/20

at 14:35;  Stop 4/28/20 at 13:53;  Status DC


Artificial Tears (Artificial Tears) 1 drop PRN Q15MIN  PRN OU DRY EYE Last 

administered on 5/29/20at 10:08;  Start 4/29/20 at 05:30


Hydromorphone HCl (Dilaudid Standard PCA) 12 mg STK-MED ONCE IV ;  Start 4/28/20

at 12:05;  Stop 4/29/20 at 09:15;  Status DC


Potassium Acetate 65 meq/Magnesium Sulfate 20 meq/ Calcium Gluconate 10 meq/ 

Multivitamins 10 ml/Chromium/ Copper/Manganese/ Seleni/Zn 0.5 ml/ Insulin Human 

Regular 30 unit/ Total Parenteral Nutrition/Amino Acids/Dextrose/ Fat Emulsion 

Intravenous 1,920 ml @  80 mls/hr TPN  CONT IV  Last administered on 4/29/20at 

22:22;  Start 4/29/20 at 22:00;  Stop 4/30/20 at 21:59;  Status DC


Cyclobenzaprine HCl (Flexeril) 10 mg PRN Q6HRS  PRN PO MUSCLE SPASMS;  Start 

4/30/20 at 10:45


Potassium Acetate 55 meq/Magnesium Sulfate 20 meq/ Calcium Gluconate 10 meq/ 

Multivitamins 10 ml/Chromium/ Copper/Manganese/ Seleni/Zn 0.5 ml/ Insulin Human 

Regular 30 unit/ Total Parenteral Nutrition/Amino Acids/Dextrose/ Fat Emulsion 

Intravenous 1,920 ml @  80 mls/hr TPN  CONT IV  Last administered on 5/1/20at 

01:00;  Start 4/30/20 at 22:00;  Stop 5/1/20 at 21:59;  Status DC


Magnesium Sulfate 50 ml @ 25 mls/hr 1X  ONCE IV  Last administered on 4/30/20at 

17:18;  Start 4/30/20 at 12:45;  Stop 4/30/20 at 14:44;  Status DC


Potassium Chloride/Water 100 ml @  100 mls/hr 1X  ONCE IV  Last administered on 

5/1/20at 11:27;  Start 5/1/20 at 12:00;  Stop 5/1/20 at 12:59;  Status DC


Hydromorphone HCl (Dilaudid Standard PCA) 12 mg STK-MED ONCE IV ;  Start 4/29/20

at 10:50;  Stop 5/1/20 at 11:02;  Status DC


Hydromorphone HCl (Dilaudid Standard PCA) 12 mg STK-MED ONCE IV ;  Start 4/30/20

at 13:47;  Stop 5/1/20 at 11:03;  Status DC


Potassium Acetate 30 meq/Magnesium Sulfate 20 meq/ Calcium Gluconate 10 meq/ 

Multivitamins 10 ml/Chromium/ Copper/Manganese/ Seleni/Zn 0.5 ml/ Insulin Human 

Regular 30 unit/ Potassium Chloride 30 meq/ Total Parenteral Nutrition/Amino 

Acids/Dextrose/ Fat Emulsion Intravenous 1,920 ml @  80 mls/hr TPN  CONT IV  La

st administered on 5/1/20at 22:34;  Start 5/1/20 at 22:00;  Stop 5/2/20 at 

21:59;  Status DC


Potassium Chloride/Water 100 ml @  100 mls/hr Q1H IV  Last administered on 

5/2/20at 13:05;  Start 5/2/20 at 07:00;  Stop 5/2/20 at 10:59;  Status DC


Magnesium Sulfate 50 ml @ 25 mls/hr 1X  ONCE IV  Last administered on 5/2/20at 

10:34;  Start 5/2/20 at 10:30;  Stop 5/2/20 at 12:29;  Status DC


Potassium Chloride 75 meq/ Magnesium Sulfate 20 meq/Calcium Gluconate 10 meq/ 

Multivitamins 10 ml/Chromium/ Copper/Manganese/ Seleni/Zn 0.5 ml/ Insulin Human 

Regular 30 unit/ Total Parenteral Nutrition/Amino Acids/Dextrose/ Fat Emulsion 

Intravenous 1,920 ml @  80 mls/hr TPN  CONT IV  Last administered on 5/2/20at 

21:51;  Start 5/2/20 at 22:00;  Stop 5/3/20 at 22:00;  Status DC


Potassium Chloride 75 meq/ Magnesium Sulfate 20 meq/Calcium Gluconate 10 meq/ 

Multivitamins 10 ml/Chromium/ Copper/Manganese/ Seleni/Zn 0.5 ml/ Insulin Human 

Regular 25 unit/ Total Parenteral Nutrition/Amino Acids/Dextrose/ Fat Emulsion 

Intravenous 1,920 ml @  80 mls/hr TPN  CONT IV  Last administered on 5/3/20at 

22:04;  Start 5/3/20 at 22:00;  Stop 5/4/20 at 21:59;  Status DC


Hydromorphone HCl (Dilaudid) 0.4 mg PRN Q4HRS  PRN IVP PAIN Last administered on

5/4/20at 10:57;  Start 5/4/20 at 09:00;  Stop 5/4/20 at 18:59;  Status DC


Micafungin Sodium 100 mg/Dextrose 100 ml @  100 mls/hr Q24H IV  Last 

administered on 5/26/20at 12:17;  Start 5/4/20 at 11:00;  Stop 5/27/20 at 09:59;

 Status DC


Daptomycin 485 mg/ Sodium Chloride 50 ml @  100 mls/hr Q24H IV  Last admin

istered on 5/11/20at 13:10;  Start 5/4/20 at 11:00;  Stop 5/12/20 at 07:44;  

Status DC


Potassium Chloride 75 meq/ Magnesium Sulfate 15 meq/Calcium Gluconate 8 meq/ 

Multivitamins 10 ml/Chromium/ Copper/Manganese/ Seleni/Zn 0.5 ml/ Insulin Human 

Regular 25 unit/ Total Parenteral Nutrition/Amino Acids/Dextrose/ Fat Emulsion 

Intravenous 1,920 ml @  80 mls/hr TPN  CONT IV  Last administered on 5/4/20at 

23:08;  Start 5/4/20 at 22:00;  Stop 5/5/20 at 21:59;  Status DC


Haloperidol Lactate (Haldol Inj) 3 mg 1X  ONCE IVP  Last administered on 

5/4/20at 14:37;  Start 5/4/20 at 14:30;  Stop 5/4/20 at 14:31;  Status DC


Hydromorphone HCl (Dilaudid) 1 mg PRN Q4HRS  PRN IVP PAIN Last administered on 

5/18/20at 06:25;  Start 5/4/20 at 19:00;  Stop 5/18/20 at 17:10;  Status DC


Potassium Chloride 75 meq/ Magnesium Sulfate 15 meq/Calcium Gluconate 8 meq/ 

Multivitamins 10 ml/Chromium/ Copper/Manganese/ Seleni/Zn 0.5 ml/ Insulin Human 

Regular 20 unit/ Total Parenteral Nutrition/Amino Acids/Dextrose/ Fat Emulsion 

Intravenous 1,920 ml @  80 mls/hr TPN  CONT IV  Last administered on 5/5/20at 

22:10;  Start 5/5/20 at 22:00;  Stop 5/6/20 at 21:59;  Status DC


Lidocaine HCl (Buffered Lidocaine 1%) 3 ml STK-MED ONCE .ROUTE ;  Start 5/6/20 

at 11:31;  Stop 5/6/20 at 11:31;  Status DC


Lidocaine HCl (Buffered Lidocaine 1%) 3 ml STK-MED ONCE .ROUTE ;  Start 5/6/20 

at 12:28;  Stop 5/6/20 at 12:29;  Status DC


Lidocaine HCl (Buffered Lidocaine 1%) 6 ml 1X  ONCE INJ  Last administered on 

5/6/20at 12:53;  Start 5/6/20 at 12:45;  Stop 5/6/20 at 12:46;  Status DC


Potassium Chloride 75 meq/ Magnesium Sulfate 15 meq/Calcium Gluconate 8 meq/ 

Multivitamins 10 ml/Chromium/ Copper/Manganese/ Seleni/Zn 0.5 ml/ Insulin Human 

Regular 20 unit/ Total Parenteral Nutrition/Amino Acids/Dextrose/ Fat Emulsion 

Intravenous 1,920 ml @  80 mls/hr TPN  CONT IV  Last administered on 5/6/20at 

22:00;  Start 5/6/20 at 22:00;  Stop 5/7/20 at 21:59;  Status DC


Potassium Chloride 75 meq/ Magnesium Sulfate 15 meq/Calcium Gluconate 8 meq/ 

Multivitamins 10 ml/Chromium/ Copper/Manganese/ Seleni/Zn 0.5 ml/ Insulin Human 

Regular 15 unit/ Total Parenteral Nutrition/Amino Acids/Dextrose/ Fat Emulsion 

Intravenous 1,920 ml @  80 mls/hr TPN  CONT IV  Last administered on 5/7/20at 

22:28;  Start 5/7/20 at 22:00;  Stop 5/8/20 at 21:59;  Status DC


Vecuronium Bromide (Norcuron Bolus) 6 mg PRN Q6HRS  PRN IV VENT ASYNCHRONY;  

Start 5/7/20 at 19:15;  Stop 5/7/20 at 19:35;  Status DC


Bumetanide (Bumex) 2 mg 1X  ONCE IV  Last administered on 5/7/20at 22:09;  Start

5/7/20 at 19:45;  Stop 5/7/20 at 19:46;  Status DC


Lidocaine HCl (Buffered Lidocaine 1%) 3 ml STK-MED ONCE .ROUTE ;  Start 5/8/20 

at 07:59;  Stop 5/8/20 at 07:59;  Status DC


Midazolam HCl (Versed) 5 mg STK-MED ONCE .ROUTE ;  Start 5/8/20 at 08:36;  Stop 

5/8/20 at 08:36;  Status DC


Fentanyl Citrate (Fentanyl 5ml Vial) 250 mcg STK-MED ONCE .ROUTE ;  Start 5/8/20

at 08:36;  Stop 5/8/20 at 08:37;  Status DC


Lidocaine HCl (Buffered Lidocaine 1%) 3 ml 1X  ONCE IJ  Last administered on 

5/8/20at 09:30;  Start 5/8/20 at 09:15;  Stop 5/8/20 at 09:16;  Status DC


Midazolam HCl (Versed) 5 mg 1X  ONCE IV  Last administered on 5/8/20at 09:30;  

Start 5/8/20 at 09:15;  Stop 5/8/20 at 09:16;  Status DC


Fentanyl Citrate (Fentanyl 5ml Vial) 250 mcg 1X  ONCE IV  Last administered on 

5/8/20at 09:30;  Start 5/8/20 at 09:15;  Stop 5/8/20 at 09:16;  Status DC


Bumetanide (Bumex) 2 mg DAILY IV  Last administered on 5/18/20at 08:07;  Start 

5/8/20 at 10:00;  Stop 5/18/20 at 17:15;  Status DC


Potassium Chloride 75 meq/ Magnesium Sulfate 15 meq/ Multivitamins 10 

ml/Chromium/ Copper/Manganese/ Seleni/Zn 0.5 ml/ Insulin Human Regular 15 unit/ 

Total Parenteral Nutrition/Amino Acids/Dextrose/ Fat Emulsion Intravenous 1,920 

ml @  80 mls/hr TPN  CONT IV  Last administered on 5/8/20at 21:59;  Start 5/8/20

at 22:00;  Stop 5/9/20 at 21:59;  Status DC


Metoclopramide HCl (Reglan Vial) 10 mg PRN Q3HRS  PRN IVP NAUSEA/VOMITING-3rd 

choice Last administered on 5/14/20at 04:25;  Start 5/9/20 at 16:45


Potassium Chloride 75 meq/ Magnesium Sulfate 15 meq/ Multivitamins 10 

ml/Chromium/ Copper/Manganese/ Seleni/Zn 0.5 ml/ Insulin Human Regular 15 unit/ 

Total Parenteral Nutrition/Amino Acids/Dextrose/ Fat Emulsion Intravenous 1,920 

ml @  80 mls/hr TPN  CONT IV  Last administered on 5/9/20at 22:41;  Start 5/9/20

at 22:00;  Stop 5/10/20 at 21:59;  Status DC


Magnesium Sulfate 50 ml @ 25 mls/hr 1X  ONCE IV  Last administered on 5/10/20at 

10:44;  Start 5/10/20 at 09:00;  Stop 5/10/20 at 10:59;  Status DC


Potassium Chloride/Water 100 ml @  100 mls/hr 1X  ONCE IV  Last administered on 

5/10/20at 09:37;  Start 5/10/20 at 09:00;  Stop 5/10/20 at 09:59;  Status DC


Duloxetine HCl (Cymbalta) 30 mg DAILY PO  Last administered on 5/11/20at 09:48; 

Start 5/10/20 at 14:00;  Stop 5/13/20 at 10:25;  Status DC


Potassium Chloride 80 meq/ Magnesium Sulfate 20 meq/ Multivitamins 10 

ml/Chromium/ Copper/Manganese/ Seleni/Zn 0.5 ml/ Insulin Human Regular 15 unit/ 

Total Parenteral Nutrition/Amino Acids/Dextrose/ Fat Emulsion Intravenous 1,920 

ml @  80 mls/hr TPN  CONT IV  Last administered on 5/10/20at 21:42;  Start 

5/10/20 at 22:00;  Stop 5/11/20 at 21:59;  Status DC


Potassium Chloride 80 meq/ Magnesium Sulfate 20 meq/ Multivitamins 10 

ml/Chromium/ Copper/Manganese/ Seleni/Zn 0.5 ml/ Insulin Human Regular 15 unit/ 

Total Parenteral Nutrition/Amino Acids/Dextrose/ Fat Emulsion Intravenous 1,920 

ml @  80 mls/hr TPN  CONT IV  Last administered on 5/11/20at 22:20;  Start 

5/11/20 at 22:00;  Stop 5/12/20 at 21:59;  Status DC


Lidocaine HCl (Buffered Lidocaine 1%) 3 ml STK-MED ONCE .ROUTE ;  Start 5/12/20 

at 09:54;  Stop 5/12/20 at 09:55;  Status DC


Hydromorphone HCl (Dilaudid Standard PCA) 12 mg STK-MED ONCE IV ;  Start 5/1/20 

at 15:50;  Stop 5/12/20 at 11:24;  Status DC


Potassium Chloride 80 meq/ Magnesium Sulfate 20 meq/ Multivitamins 10 

ml/Chromium/ Copper/Manganese/ Seleni/Zn 0.5 ml/ Insulin Human Regular 15 unit/ 

Total Parenteral Nutrition/Amino Acids/Dextrose/ Fat Emulsion Intravenous 1,920 

ml @  80 mls/hr TPN  CONT IV  Last administered on 5/12/20at 21:40;  Start 

5/12/20 at 22:00;  Stop 5/13/20 at 21:59;  Status DC


Lidocaine HCl (Buffered Lidocaine 1%) 6 ml 1X  ONCE INJ  Last administered on 

5/12/20at 14:15;  Start 5/12/20 at 14:15;  Stop 5/12/20 at 14:16;  Status DC


Potassium Chloride 80 meq/ Magnesium Sulfate 20 meq/ Multivitamins 10 

ml/Chromium/ Copper/Manganese/ Seleni/Zn 1 ml/ Insulin Human Regular 15 unit/ 

Total Parenteral Nutrition/Amino Acids/Dextrose/ Fat Emulsion Intravenous 1,920 

ml @  80 mls/hr TPN  CONT IV  Last administered on 5/13/20at 22:04;  Start 

5/13/20 at 22:00;  Stop 5/14/20 at 21:59;  Status DC


Potassium Chloride/Water 100 ml @  100 mls/hr 1X  ONCE IV  Last administered on 

5/14/20at 11:34;  Start 5/14/20 at 11:00;  Stop 5/14/20 at 11:59;  Status DC


Potassium Chloride 90 meq/ Magnesium Sulfate 20 meq/ Multivitamins 10 

ml/Chromium/ Copper/Manganese/ Seleni/Zn 1 ml/ Insulin Human Regular 15 unit/ 

Total Parenteral Nutrition/Amino Acids/Dextrose/ Fat Emulsion Intravenous 1,920 

ml @  80 mls/hr TPN  CONT IV  Last administered on 5/14/20at 22:57;  Start 

5/14/20 at 22:00;  Stop 5/15/20 at 21:59;  Status DC


Potassium Chloride 90 meq/ Magnesium Sulfate 20 meq/ Multivitamins 10 

ml/Chromium/ Copper/Manganese/ Seleni/Zn 1 ml/ Insulin Human Regular 15 unit/ 

Total Parenteral Nutrition/Amino Acids/Dextrose/ Fat Emulsion Intravenous 1,920 

ml @  80 mls/hr TPN  CONT IV  Last administered on 5/15/20at 22:48;  Start 

5/15/20 at 22:00;  Stop 5/16/20 at 21:59;  Status DC


Potassium Chloride 90 meq/ Magnesium Sulfate 20 meq/ Multivitamins 10 

ml/Chromium/ Copper/Manganese/ Seleni/Zn 1 ml/ Insulin Human Regular 15 unit/ 

Total Parenteral Nutrition/Amino Acids/Dextrose/ Fat Emulsion Intravenous 1,890 

ml @  78.75 mls/ hr TPN  CONT IV  Last administered on 5/16/20at 22:15;  Start 

5/16/20 at 22:00;  Stop 5/17/20 at 21:59;  Status DC


Linezolid/Dextrose 300 ml @  300 mls/hr Q12HR IV  Last administered on 5/19/20at

21:08;  Start 5/17/20 at 09:00;  Stop 5/20/20 at 08:11;  Status DC


Daptomycin 450 mg/ Sodium Chloride 50 ml @  100 mls/hr Q24H IV  Last 

administered on 5/20/20at 09:25;  Start 5/17/20 at 09:00;  Stop 5/21/20 at 

08:30;  Status DC


Potassium Chloride 90 meq/ Magnesium Sulfate 20 meq/ Multivitamins 10 

ml/Chromium/ Copper/Manganese/ Seleni/Zn 1 ml/ Insulin Human Regular 15 unit/ T

otal Parenteral Nutrition/Amino Acids/Dextrose/ Fat Emulsion Intravenous 1,890 

ml @  78.75 mls/ hr TPN  CONT IV  Last administered on 5/17/20at 21:34;  Start 

5/17/20 at 22:00;  Stop 5/18/20 at 21:59;  Status DC


Lorazepam (Ativan Inj) 2 mg STK-MED ONCE .ROUTE ;  Start 5/17/20 at 14:58;  Stop

5/17/20 at 14:58;  Status DC


Metoprolol Tartrate (Lopressor Vial) 5 mg 1X  ONCE IVP  Last administered on 

5/17/20at 15:31;  Start 5/17/20 at 15:15;  Stop 5/17/20 at 15:16;  Status DC


Lorazepam (Ativan Inj) 2 mg 1X  ONCE IVP  Last administered on 5/17/20at 15:30; 

Start 5/17/20 at 15:15;  Stop 5/17/20 at 15:16;  Status DC


Enoxaparin Sodium (Lovenox 40mg Syringe) 40 mg Q24H SQ  Last administered on 

6/5/20at 17:44;  Start 5/17/20 at 17:00;  Stop 6/7/20 at 06:50;  Status DC


Lorazepam (Ativan Inj) 1 mg PRN Q4HRS  PRN IVP ANXIETY / AGITATION MILD-MOD Last

administered on 5/31/20at 15:55;  Start 5/17/20 at 19:15;  Stop 6/2/20 at 11:45;

 Status DC


Lorazepam (Ativan Inj) 2 mg PRN Q4HRS  PRN IVP ANXIETY / AGITATION SEVERE Last 

administered on 6/1/20at 07:55;  Start 5/17/20 at 19:15;  Stop 6/2/20 at 11:45; 

Status DC


Fentanyl Citrate (Fentanyl 2ml Vial) 50 mcg PRN Q4HRS  PRN IVP SEVERE PAIN Last 

administered on 6/7/20at 14:39;  Start 5/18/20 at 13:15


Fentanyl Citrate (Fentanyl 2ml Vial) 25 mcg PRN Q4HRS  PRN IVP MODERATE PAIN 

Last administered on 6/6/20at 21:55;  Start 5/18/20 at 13:15


Potassium Chloride 90 meq/ Magnesium Sulfate 20 meq/ Multivitamins 10 

ml/Chromium/ Copper/Manganese/ Seleni/Zn 1 ml/ Insulin Human Regular 15 unit/ 

Total Parenteral Nutrition/Amino Acids/Dextrose/ Fat Emulsion Intravenous 1,890 

ml @  78.75 mls/ hr TPN  CONT IV  Last administered on 5/18/20at 22:18;  Start 

5/18/20 at 22:00;  Stop 5/19/20 at 21:59;  Status DC


Furosemide (Lasix) 40 mg 1X  ONCE IVP  Last administered on 5/18/20at 21:51;  

Start 5/18/20 at 21:45;  Stop 5/18/20 at 21:48;  Status DC


Albumin Human 100 ml @  100 mls/hr 1X PRN  PRN IV SEE COMMENTS;  Start 5/19/20 

at 01:30


Furosemide (Lasix) 40 mg [id number] IVP  Last administered on 6/3/20at 08:04;  Start 

5/19/20 at 14:00;  Stop 6/3/20 at 13:07;  Status DC


Potassium Chloride 90 meq/ Magnesium Sulfate 20 meq/ Multivitamins 10 ml/Ch

romium/ Copper/Manganese/ Seleni/Zn 1 ml/ Insulin Human Regular 15 unit/ Total 

Parenteral Nutrition/Amino Acids/Dextrose/ Fat Emulsion Intravenous 1,800 ml @  

75 mls/hr TPN  CONT IV  Last administered on 5/19/20at 22:31;  Start 5/19/20 at 

22:00;  Stop 5/20/20 at 21:59;  Status DC


Potassium Chloride 90 meq/ Magnesium Sulfate 20 meq/ Multivitamins 10 

ml/Chromium/ Copper/Manganese/ Seleni/Zn 1 ml/ Insulin Human Regular 15 unit/ 

Total Parenteral Nutrition/Amino Acids/Dextrose/ Fat Emulsion Intravenous 1,800 

ml @  75 mls/hr TPN  CONT IV  Last administered on 5/20/20at 22:28;  Start 

5/20/20 at 22:00;  Stop 5/21/20 at 21:59;  Status DC


Potassium Chloride 110 meq/ Magnesium Sulfate 20 meq/ Multivitamins 10 

ml/Chromium/ Copper/Manganese/ Seleni/Zn 1 ml/ Insulin Human Regular 15 unit/ 

Total Parenteral Nutrition/Amino Acids/Dextrose/ Fat Emulsion Intravenous 1,800 

ml @  75 mls/hr TPN  CONT IV  Last administered on 5/21/20at 22:01;  Start 

5/21/20 at 22:00;  Stop 5/22/20 at 21:59;  Status DC


Saliva Substitute (Biotene Moisturizing Mouth) 2 spray PRN Q15MIN  PRN PO DRY 

MOUTH;  Start 5/21/20 at 11:00


Potassium Chloride 110 meq/ Magnesium Sulfate 20 meq/ Multivitamins 10 

ml/Chromium/ Copper/Manganese/ Seleni/Zn 1 ml/ Insulin Human Regular 15 unit/ 

Total Parenteral Nutrition/Amino Acids/Dextrose/ Fat Emulsion Intravenous 1,800 

ml @  75 mls/hr TPN  CONT IV  Last administered on 5/22/20at 22:21;  Start 5 /22/20 at 22:00;  Stop 5/23/20 at 21:59;  Status DC


Potassium Chloride 110 meq/ Magnesium Sulfate 20 meq/ Multivitamins 10 ml/Ch

romium/ Copper/Manganese/ Seleni/Zn 1 ml/ Insulin Human Regular 15 unit/ Total 

Parenteral Nutrition/Amino Acids/Dextrose/ Fat Emulsion Intravenous 1,800 ml @  

75 mls/hr TPN  CONT IV  Last administered on 5/23/20at 22:04;  Start 5/23/20 at 

22:00;  Stop 5/24/20 at 21:59;  Status DC


Potassium Chloride 110 meq/ Magnesium Sulfate 20 meq/ Multivitamins 10 

ml/Chromium/ Copper/Manganese/ Seleni/Zn 1 ml/ Insulin Human Regular 15 unit/ 

Total Parenteral Nutrition/Amino Acids/Dextrose/ Fat Emulsion Intravenous 1,800 

ml @  75 mls/hr TPN  CONT IV  Last administered on 5/24/20at 22:48;  Start 

5/24/20 at 22:00;  Stop 5/25/20 at 21:59;  Status DC


Potassium Chloride 70 meq/ Magnesium Sulfate 20 meq/ Multivitamins 10 

ml/Chromium/ Copper/Manganese/ Seleni/Zn 1 ml/ Insulin Human Regular 15 unit/ 

Total Parenteral Nutrition/Amino Acids/Dextrose/ Fat Emulsion Intravenous 1,800 

ml @  75 mls/hr TPN  CONT IV  Last administered on 5/25/20at 21:39;  Start 

5/25/20 at 22:00;  Stop 5/26/20 at 21:59;  Status DC


Meropenem 500 mg/ Sodium Chloride 50 ml @  100 mls/hr Q6HRS IV  Last 

administered on 5/27/20at 06:02;  Start 5/25/20 at 18:00;  Stop 5/27/20 at 

09:59;  Status DC


Barium Sulfate (Varibar Thin Liquid Apple) 148 gm 1X  ONCE PO ;  Start 5/26/20 

at 11:45;  Stop 5/26/20 at 11:49;  Status DC


Potassium Chloride 70 meq/ Magnesium Sulfate 20 meq/ Multivitamins 10 

ml/Chromium/ Copper/Manganese/ Seleni/Zn 1 ml/ Insulin Human Regular 15 unit/ 

Total Parenteral Nutrition/Amino Acids/Dextrose/ Fat Emulsion Intravenous 1,800 

ml @  75 mls/hr TPN  CONT IV  Last administered on 5/26/20at 22:27;  Start 

5/26/20 at 22:00;  Stop 5/27/20 at 21:59;  Status DC


Piperacillin Sod/ Tazobactam Sod 3.375 gm/Sodium Chloride 50 ml @  100 mls/hr 

Q6HRS IV  Last administered on 6/4/20at 06:10;  Start 5/27/20 at 12:00;  Stop 

6/4/20 at 07:26;  Status DC


Potassium Chloride 70 meq/ Magnesium Sulfate 20 meq/ Multivitamins 10 

ml/Chromium/ Copper/Manganese/ Seleni/Zn 1 ml/ Insulin Human Regular 15 unit/ 

Total Parenteral Nutrition/Amino Acids/Dextrose/ Fat Emulsion Intravenous 1,800 

ml @  75 mls/hr TPN  CONT IV  Last administered on 5/27/20at 22:03;  Start 

5/27/20 at 22:00;  Stop 5/28/20 at 21:59;  Status DC


Potassium Chloride 70 meq/ Magnesium Sulfate 20 meq/ Multivitamins 10 

ml/Chromium/ Copper/Manganese/ Seleni/Zn 1 ml/ Insulin Human Regular 15 unit/ 

Total Parenteral Nutrition/Amino Acids/Dextrose/ Fat Emulsion Intravenous 1,800 

ml @  75 mls/hr TPN  CONT IV  Last administered on 5/28/20at 22:33;  Start 

5/28/20 at 22:00;  Stop 5/29/20 at 21:59;  Status DC


Potassium Chloride 70 meq/ Magnesium Sulfate 20 meq/ Multivitamins 10 

ml/Chromium/ Copper/Manganese/ Seleni/Zn 1 ml/ Insulin Human Regular 15 unit/ 

Total Parenteral Nutrition/Amino Acids/Dextrose/ Fat Emulsion Intravenous 1,800 

ml @  75 mls/hr TPN  CONT IV  Last administered on 5/29/20at 23:13;  Start 

5/29/20 at 22:00;  Stop 5/30/20 at 21:59;  Status DC


Potassium Chloride 80 meq/ Magnesium Sulfate 20 meq/ Multivitamins 10 

ml/Chromium/ Copper/Manganese/ Seleni/Zn 1 ml/ Insulin Human Regular 15 unit/ 

Total Parenteral Nutrition/Amino Acids/Dextrose/ Fat Emulsion Intravenous 1,800 

ml @  75 mls/hr TPN  CONT IV  Last administered on 5/30/20at 22:30;  Start 

5/30/20 at 22:00;  Stop 5/31/20 at 21:59;  Status DC


Potassium Chloride 80 meq/ Magnesium Sulfate 20 meq/ Multivitamins 10 

ml/Chromium/ Copper/Manganese/ Seleni/Zn 1 ml/ Insulin Human Regular 15 unit/ 

Total Parenteral Nutrition/Amino Acids/Dextrose/ Fat Emulsion Intravenous 1,800 

ml @  75 mls/hr TPN  CONT IV  Last administered on 5/31/20at 21:54;  Start 

5/31/20 at 22:00;  Stop 6/1/20 at 21:59;  Status DC


Potassium Chloride/Water 100 ml @  100 mls/hr 1X  ONCE IV  Last administered on 

6/1/20at 10:15;  Start 6/1/20 at 10:00;  Stop 6/1/20 at 10:59;  Status DC


Potassium Chloride 90 meq/ Magnesium Sulfate 20 meq/ Multivitamins 10 

ml/Chromium/ Copper/Manganese/ Seleni/Zn 1 ml/ Insulin Human Regular 20 unit/ 

Total Parenteral Nutrition/Amino Acids/Dextrose/ Fat Emulsion Intravenous 1,800 

ml @  75 mls/hr TPN  CONT IV  Last administered on 6/1/20at 22:28;  Start 6/1/20

at 22:00;  Stop 6/2/20 at 21:59;  Status DC


Potassium Chloride 90 meq/ Magnesium Sulfate 20 meq/ Multivitamins 10 

ml/Chromium/ Copper/Manganese/ Seleni/Zn 1 ml/ Insulin Human Regular 20 unit/ 

Total Parenteral Nutrition/Amino Acids/Dextrose/ Fat Emulsion Intravenous 1,800 

ml @  75 mls/hr TPN  CONT IV  Last administered on 6/2/20at 22:08;  Start 6/2/20

at 22:00;  Stop 6/3/20 at 21:59;  Status DC


Lorazepam (Ativan Inj) 0.25 mg PRN Q4HRS  PRN IVP ANXIETY / AGITATION Last 

administered on 6/3/20at 07:55;  Start 6/3/20 at 07:30


Potassium Chloride 90 meq/ Magnesium Sulfate 20 meq/ Multivitamins 10 

ml/Chromium/ Copper/Manganese/ Seleni/Zn 1 ml/ Insulin Human Regular 20 unit/ 

Total Parenteral Nutrition/Amino Acids/Dextrose/ Fat Emulsion Intravenous 1,800 

ml @  75 mls/hr TPN  CONT IV  Last administered on 6/3/20at 23:13;  Start 6/3/20

at 22:00;  Stop 6/4/20 at 21:59;  Status DC


Furosemide (Lasix) 40 mg DAILY IVP  Last administered on 6/5/20at 11:14;  Start 

6/3/20 at 13:30;  Stop 6/7/20 at 09:12;  Status DC


Fluoxetine HCl (PROzac) 20 mg QHS PEG  Last administered on 6/6/20at 21:47;  

Start 6/4/20 at 21:00


Fentanyl (Duragesic 50mcg/ Hr Patch) 1 patch Q72H TD  Last administered on 6 /4/20at 21:22;  Start 6/4/20 at 21:00


Potassium Chloride 40 meq/ Potassium Acetate 60 meq/Magnesium Sulfate 10 meq/ 

Multivitamins 10 ml/Chromium/ Copper/Manganese/ Seleni/Zn 1 ml/ Insulin Human 

Regular 20 unit/ Total Parenteral Nutrition/Amino Acids/Dextrose/ Fat Emulsion 

Intravenous 1,800 ml @  75 mls/hr TPN  CONT IV  Last administered on 6/5/20at 

00:03;  Start 6/4/20 at 22:00;  Stop 6/5/20 at 21:59;  Status DC


Potassium Acetate 80 meq/Magnesium Sulfate 5 meq/ Multivitamins 10 ml/Chromium/ 

Copper/Manganese/ Seleni/Zn 1 ml/ Insulin Human Regular 20 unit/ Total 

Parenteral Nutrition/Amino Acids/Dextrose/ Fat Emulsion Intravenous 1,920 ml @  

80 mls/hr TPN  CONT IV  Last administered on 6/5/20at 21:59;  Start 6/5/20 at 

22:00;  Stop 6/6/20 at 21:59;  Status DC


Potassium Acetate 60 meq/Magnesium Sulfate 5 meq/ Multivitamins 10 ml/Chromium/ 

Copper/Manganese/ Seleni/Zn 1 ml/ Insulin Human Regular 30 unit/ Total 

Parenteral Nutrition/Amino Acids/Dextrose/ Fat Emulsion Intravenous 1,920 ml @  

80 mls/hr TPN  CONT IV  Last administered on 6/6/20at 21:54;  Start 6/6/20 at 

22:00;  Stop 6/7/20 at 21:59


Norepinephrine Bitartrate 8 mg/ Dextrose 258 ml @  13.332 mls/ hr CONT  PRN IV 

PER PROTOCOL Last administered on 6/7/20at 06:33;  Start 6/7/20 at 06:30


Albumin Human 500 ml @  125 mls/hr 1X  ONCE IV  Last administered on 6/7/20at 

08:10;  Start 6/7/20 at 08:15;  Stop 6/7/20 at 12:14;  Status DC


Potassium Acetate 40 meq/Magnesium Sulfate 5 meq/ Multivitamins 10 ml/Chromium/ 

Copper/Manganese/ Seleni/Zn 1 ml/ Insulin Human Regular 30 unit/ Total 

Parenteral Nutrition/Amino Acids/Dextrose/ Fat Emulsion Intravenous 1,920 ml @  

80 mls/hr TPN  CONT IV ;  Start 6/7/20 at 22:00;  Stop 6/8/20 at 21:59


Meropenem 1 gm/ Sodium Chloride 100 ml @  200 mls/hr Q8HRS IV ;  Start 6/7/20 at

14:00;  Status Cancel


Meropenem 1 gm/ Sodium Chloride 100 ml @  200 mls/hr Q8HRS IV  Last administered

on 6/7/20at 11:04;  Start 6/7/20 at 10:00;  Stop 6/7/20 at 13:00;  Status DC


Meropenem 1 gm/ Sodium Chloride 100 ml @  200 mls/hr Q12HR IV ;  Start 6/7/20 at

21:00


Sodium Chloride 1,000 ml @  1,000 mls/hr 1X  ONCE IV  Last administered on 

6/7/20at 11:06;  Start 6/7/20 at 10:45;  Stop 6/7/20 at 11:44;  Status DC


Micafungin Sodium 100 mg/Dextrose 100 ml @  100 mls/hr Q24H IV  Last 

administered on 6/7/20at 14:12;  Start 6/7/20 at 11:00


Daptomycin 410 mg/ Sodium Chloride 50 ml @  100 mls/hr Q24H IV  Last 

administered on 6/7/20at 14:14;  Start 6/7/20 at 14:00


Midazolam HCl (Versed) 2 mg STK-MED ONCE .ROUTE ;  Start 6/7/20 at 14:47;  Stop 

6/7/20 at 14:48;  Status DC


Fentanyl Citrate (Fentanyl 2ml Vial) 100 mcg STK-MED ONCE .ROUTE ;  Start 6/7/20

at 14:47;  Stop 6/7/20 at 14:48;  Status DC


Flumazenil (Romazicon) 0.5 mg STK-MED ONCE IV ;  Start 6/7/20 at 14:48;  Stop 

6/7/20 at 14:48;  Status DC


Naloxone HCl (Narcan) 0.4 mg STK-MED ONCE .ROUTE ;  Start 6/7/20 at 14:48;  Stop

6/7/20 at 14:48;  Status DC


Lidocaine HCl (Lidocaine 1% 20ml Vial) 20 ml STK-MED ONCE .ROUTE ;  Start 6/7/20

at 14:48;  Stop 6/7/20 at 14:48;  Status DC


Midazolam HCl (Versed) 2 mg 1X  ONCE IV  Last administered on 6/7/20at 15:28;  

Start 6/7/20 at 15:00;  Stop 6/7/20 at 15:01;  Status DC


Fentanyl Citrate (Fentanyl 2ml Vial) 100 mcg 1X  ONCE IV  Last administered on 

6/7/20at 15:28;  Start 6/7/20 at 15:00;  Stop 6/7/20 at 15:01;  Status DC


Lidocaine HCl (Lidocaine 1% 20ml Vial) 20 ml 1X  ONCE INJ  Last administered on 

6/7/20at 15:30;  Start 6/7/20 at 15:00;  Stop 6/7/20 at 15:01;  Status DC





Active Scripts


Active


Reported


Bisoprolol Fumarate 5 Mg Tablet 10 Mg PO DAILY


Vitals/I & O





Vital Sign - Last 24 Hours








 6/6/20 6/6/20 6/6/20 6/6/20





 20:00 20:00 21:00 21:55


 


Temp 102.2   





 102.2   


 


Pulse 137  138 


 


Resp 25 27 27


 


B/P (MAP) 99/54 (69)  101/51 (68) 


 


Pulse Ox 99  96 97


 


O2 Delivery Tracheal Collar Trach Collar Tracheal Collar Tracheal Collar


 


    





    





 6/6/20 6/6/20 6/6/20 6/7/20





 22:00 22:25 23:00 00:00


 


Temp 100.2   





 100.2   


 


Pulse 138  134 


 


Resp 26 25 28 


 


B/P (MAP) 89/55 (66)  88/56 (67) 


 


Pulse Ox 94 96 96 


 


O2 Delivery Venturi Mask Venturi Mask Venturi Mask Venturi Mask


 


O2 Flow Rate 10.0  12.0 12.0


 


    





    





 6/7/20 6/7/20 6/7/20 6/7/20





 00:01 01:00 02:00 03:00


 


Temp 99.6   





 99.6   


 


Pulse 129 134 134 139


 


Resp 24 28 26 27


 


B/P (MAP) 90/54 (66) 96/61 (73) 103/60 (74) 102/61 (75)


 


Pulse Ox 94 93 99 97


 


O2 Delivery Venturi Mask Venturi Mask Venturi Mask Venturi Mask


 


O2 Flow Rate 12.0 15.0 15.0 15.0


 


    





    





 6/7/20 6/7/20 6/7/20 6/7/20





 04:00 04:00 05:00 06:00


 


Temp 102.2   





 102.2   


 


Pulse 144  150 147


 


Resp 28 27 30


 


B/P (MAP) 91/66 (74)  89/57 (68) 81/41 (54)


 


Pulse Ox 94  98 100


 


O2 Delivery Venturi Mask Venturi Mask Venturi Mask Venturi Mask


 


O2 Flow Rate 15.0 15.0 15.0 15.0


 


    





    





 6/7/20 6/7/20 6/7/20 6/7/20





 07:00 08:00 08:00 08:30


 


Temp    100.2





    100.2


 


Pulse 140 137  130


 


Resp 28 28 26


 


B/P (MAP) 101/56 (71) 81/55 (64)  98/54 (69)


 


Pulse Ox 100 100  100


 


O2 Delivery Venturi Mask Venturi Mask Venturi Mask Venturi Mask


 


O2 Flow Rate 15.0 15.0 15.0 15.0


 


    





    





 6/7/20 6/7/20 6/7/20 6/7/20





 09:00 09:48 10:00 10:05


 


Temp  100.2  100.2





  100.2  100.2


 


Pulse 130 150 130 150


 


Resp 26 28 26 28


 


B/P (MAP) 95/60 (72) 116/72 106/69 (81) 106/72


 


Pulse Ox 100  100 


 


O2 Delivery Venturi Mask  Venturi Mask 


 


O2 Flow Rate 15.0  15.0 


 


    





    





 6/7/20 6/7/20 6/7/20 6/7/20





 10:20 10:35 11:00 11:33


 


Temp 100.2 100.3  100.2





 100.2 100.3  100.2


 


Pulse 148 148 133 148


 


Resp 28 28 30 28


 


B/P (MAP) 102/68 105/58 113/66 (82) 102/53


 


Pulse Ox   100 


 


O2 Delivery   Venturi Mask 


 


O2 Flow Rate   15.0 


 


    





    





 6/7/20 6/7/20 6/7/20 6/7/20





 12:00 12:00 14:39 15:09


 


Temp  98.6  





  98.6  


 


Pulse  134  


 


Resp  30  


 


B/P (MAP)  119/62 (81)  


 


Pulse Ox  100 100 99


 


O2 Delivery Venturi Mask Venturi Mask Venturi Mask Venturi Mask


 


O2 Flow Rate 15.0 15.0 15.0 15.0


 


    





    





 6/7/20 6/7/20 6/7/20 6/7/20





 15:28 15:28 15:30 15:35


 


Pulse 150  150 148


 


Resp 26 25 26 26


 


B/P (MAP)   98/46 (63) 101/46 (64)


 


Pulse Ox 94  95 95


 


O2 Delivery Venturi Mask  Venturi Mask Venturi Mask


 


O2 Flow Rate 15.0  15.0 15.0





 6/7/20 6/7/20 6/7/20 6/7/20





 15:40 15:45 15:50 15:55


 


Pulse 148 146 147 146


 


Resp 28 28 28 28


 


B/P (MAP) 106/50 (68) 88/42 (57) 98/56 (70) 108/56 (73)


 


Pulse Ox 96 94 96 97


 


O2 Delivery Venturi Mask Venturi Mask Venturi Mask Venturi Mask


 


O2 Flow Rate 15.0 15.0 15.0 15.0





 6/7/20 6/7/20 6/7/20 6/7/20





 16:00 16:00 16:05 16:10


 


Pulse 146  147 147


 


Resp 28 28 28


 


B/P (MAP) 104/49 (67)  105/51 (69) 110/43 (65)


 


Pulse Ox 97  99 100


 


O2 Delivery Venturi Mask Venturi Mask Venturi Mask Venturi Mask


 


O2 Flow Rate 15.0 15.0 15.0 15.0





 6/7/20 6/7/20 6/7/20 6/7/20





 16:15 16:20 16:25 16:30


 


Pulse 147 146 145 145


 


Resp 28 28 28 26


 


B/P (MAP) 113/52 (72) 112/52 (72) 106/51 (69) 107/52 (70)


 


Pulse Ox 100 100 99 99


 


O2 Delivery Venturi Mask Venturi Mask Venturi Mask Venturi Mask


 


O2 Flow Rate 15.0 15.0 15.0 15.0





 6/7/20 6/7/20 6/7/20 6/7/20





 16:35 16:40 16:45 16:50


 


Pulse 144 144 145 143


 


Resp 26 24 26 25


 


B/P (MAP) 106/50 (68) 108/53 (71) 107/52 (70) 103/51 (68)


 


Pulse Ox 99 99 100 100


 


O2 Delivery Venturi Mask Venturi Mask Venturi Mask Venturi Mask


 


O2 Flow Rate 15.0 15.0 15.0 15.0





 6/7/20 6/7/20 6/7/20 6/7/20





 16:55 17:00 17:05 17:10


 


Pulse 143 141 141 140


 


Resp 25 24 24 25


 


B/P (MAP) 108/54 (72) 98/52 (67) 103/54 (70) 110/53 (72)


 


Pulse Ox 100 100 100 99


 


O2 Delivery Venturi Mask Venturi Mask Venturi Mask Venturi Mask


 


O2 Flow Rate 15.0 15.0 15.0 15.0





 6/7/20 6/7/20 6/7/20 6/7/20





 17:15 17:20 17:25 17:36


 


Pulse 139 138 136 136


 


Resp 25 25 26 26


 


B/P (MAP) 80/53 (62) 104/55 (71) 107/52 (70) 


 


Pulse Ox 99 100 96 99


 


O2 Delivery Venturi Mask Venturi Mask Venturi Mask Venturi Mask


 


O2 Flow Rate 15.0 15.0 15.0 15.0














Intake and Output   


 


 6/6/20 6/6/20 6/7/20





 15:00 23:00 07:00


 


Intake Total  550 ml 2087 ml


 


Output Total 415 ml 375 ml 505 ml


 


Balance -415 ml 175 ml 1582 ml











Hemodynamically unstable?:  No


Is patient in severe pain?:  No


Is NPO status required?:  No











GRAEME MASSEY MD              Jun 7, 2020 19:32

## 2020-06-07 NOTE — NUR
Text sent to DR Castano regarding pooling blood out of right side drain site, dressing 
completely saturated with dark brown/red blood. Physician is going to place a call to 
[facility]. 

Dr Castano at bedside, orders to given 500ml bolus of albumin and 1uPRBC. BP labile on Levophed 
SBP 90's-160's-orders to have anesthesia place bedside artline.

## 2020-06-07 NOTE — NUR
Pt vomited multiple times throughout this shift. Nausea and vomiting resolved for short 
periods of time with PRN Zofran x2 and PRN Compazine x1. Within the last hour, pts old right 
abd ROBERT drain sites began to drain profusely. Drainage red/brown in color. Dressing has been 
changed x3. Pt mental status noticeably declined throughout the shift. Pt had temperature of 
102.2F at beginning of the shift, resolved with Tylenol. Pt again had temperature of 102.2F 
with morning temperature, again treated with Tylenol. Pts blood pressure then dropped this 
morning, 77/44. Call placed to on-call physician, Dr. Lozada. Received orders to start 
titratable Levophed, draw lactic acid, call Critical Care MD. Call then placed to Dr. Castano. 
Received orders to stop Lovenox, repeat Hgb at 1200. Last call placed to Dr. Summers. MD 
stated to continue supporting BP at this time with Levophed, replace blood per protocol, 
draw coagulation labs. Orders all entered into system and initiated. Pt is currently resting 
with eyes closed and call light within reach. Pts current BP 98/54. Will pass on and 
continue to monitor.

## 2020-06-08 VITALS — SYSTOLIC BLOOD PRESSURE: 139 MMHG | DIASTOLIC BLOOD PRESSURE: 72 MMHG

## 2020-06-08 VITALS — SYSTOLIC BLOOD PRESSURE: 166 MMHG | DIASTOLIC BLOOD PRESSURE: 80 MMHG

## 2020-06-08 VITALS — DIASTOLIC BLOOD PRESSURE: 62 MMHG | SYSTOLIC BLOOD PRESSURE: 109 MMHG

## 2020-06-08 VITALS — SYSTOLIC BLOOD PRESSURE: 108 MMHG | DIASTOLIC BLOOD PRESSURE: 62 MMHG

## 2020-06-08 VITALS — DIASTOLIC BLOOD PRESSURE: 61 MMHG | SYSTOLIC BLOOD PRESSURE: 111 MMHG

## 2020-06-08 VITALS — SYSTOLIC BLOOD PRESSURE: 119 MMHG | DIASTOLIC BLOOD PRESSURE: 66 MMHG

## 2020-06-08 VITALS — SYSTOLIC BLOOD PRESSURE: 136 MMHG | DIASTOLIC BLOOD PRESSURE: 72 MMHG

## 2020-06-08 VITALS — SYSTOLIC BLOOD PRESSURE: 113 MMHG | DIASTOLIC BLOOD PRESSURE: 59 MMHG

## 2020-06-08 VITALS — DIASTOLIC BLOOD PRESSURE: 60 MMHG | SYSTOLIC BLOOD PRESSURE: 115 MMHG

## 2020-06-08 VITALS — DIASTOLIC BLOOD PRESSURE: 54 MMHG | SYSTOLIC BLOOD PRESSURE: 102 MMHG

## 2020-06-08 VITALS — SYSTOLIC BLOOD PRESSURE: 129 MMHG | DIASTOLIC BLOOD PRESSURE: 66 MMHG

## 2020-06-08 VITALS — DIASTOLIC BLOOD PRESSURE: 56 MMHG | SYSTOLIC BLOOD PRESSURE: 104 MMHG

## 2020-06-08 VITALS — SYSTOLIC BLOOD PRESSURE: 112 MMHG | DIASTOLIC BLOOD PRESSURE: 60 MMHG

## 2020-06-08 VITALS — SYSTOLIC BLOOD PRESSURE: 135 MMHG | DIASTOLIC BLOOD PRESSURE: 68 MMHG

## 2020-06-08 VITALS — SYSTOLIC BLOOD PRESSURE: 154 MMHG | DIASTOLIC BLOOD PRESSURE: 71 MMHG

## 2020-06-08 VITALS — SYSTOLIC BLOOD PRESSURE: 138 MMHG | DIASTOLIC BLOOD PRESSURE: 72 MMHG

## 2020-06-08 VITALS — DIASTOLIC BLOOD PRESSURE: 65 MMHG | SYSTOLIC BLOOD PRESSURE: 110 MMHG

## 2020-06-08 VITALS — SYSTOLIC BLOOD PRESSURE: 99 MMHG | DIASTOLIC BLOOD PRESSURE: 54 MMHG

## 2020-06-08 VITALS — DIASTOLIC BLOOD PRESSURE: 56 MMHG | SYSTOLIC BLOOD PRESSURE: 123 MMHG

## 2020-06-08 VITALS — DIASTOLIC BLOOD PRESSURE: 64 MMHG | SYSTOLIC BLOOD PRESSURE: 124 MMHG

## 2020-06-08 VITALS — DIASTOLIC BLOOD PRESSURE: 54 MMHG | SYSTOLIC BLOOD PRESSURE: 128 MMHG

## 2020-06-08 VITALS — DIASTOLIC BLOOD PRESSURE: 64 MMHG | SYSTOLIC BLOOD PRESSURE: 107 MMHG

## 2020-06-08 VITALS — SYSTOLIC BLOOD PRESSURE: 134 MMHG | DIASTOLIC BLOOD PRESSURE: 70 MMHG

## 2020-06-08 VITALS — SYSTOLIC BLOOD PRESSURE: 95 MMHG | DIASTOLIC BLOOD PRESSURE: 65 MMHG

## 2020-06-08 LAB
% BANDS: 25 % (ref 0–9)
% LYMPHS: 8 % (ref 24–48)
% MONOS: 4 % (ref 0–10)
% SEGS: 63 % (ref 35–66)
ALBUMIN SERPL-MCNC: 1.5 G/DL (ref 3.4–5)
ALBUMIN/GLOB SERPL: 0.4 {RATIO} (ref 1–1.7)
ALP SERPL-CCNC: 95 U/L (ref 46–116)
ALT SERPL-CCNC: 12 U/L (ref 14–59)
ANION GAP SERPL CALC-SCNC: 8 MMOL/L (ref 6–14)
ANISOCYTOSIS BLD QL SMEAR: SLIGHT
AST SERPL-CCNC: 15 U/L (ref 15–37)
BASE EXCESS ABG: -5 MMOL/L (ref -3–3)
BASOPHILS # BLD AUTO: 0 X10^3/UL (ref 0–0.2)
BASOPHILS NFR BLD: 0 % (ref 0–3)
BILIRUB SERPL-MCNC: 0.5 MG/DL (ref 0.2–1)
BUN SERPL-MCNC: 58 MG/DL (ref 7–20)
BUN/CREAT SERPL: 45 (ref 6–20)
CALCIUM SERPL-MCNC: 9.9 MG/DL (ref 8.5–10.1)
CHLORIDE SERPL-SCNC: 118 MMOL/L (ref 98–107)
CO2 SERPL-SCNC: 25 MMOL/L (ref 21–32)
CREAT SERPL-MCNC: 1.3 MG/DL (ref 0.6–1)
DOHLE BOD BLD QL SMEAR: PRESENT
EOSINOPHIL NFR BLD: 0.1 X10^3/UL (ref 0–0.7)
EOSINOPHIL NFR BLD: 1 % (ref 0–3)
ERYTHROCYTE [DISTWIDTH] IN BLOOD BY AUTOMATED COUNT: 16.8 % (ref 11.5–14.5)
ERYTHROCYTE [DISTWIDTH] IN BLOOD BY AUTOMATED COUNT: 16.8 % (ref 11.5–14.5)
ERYTHROCYTE [DISTWIDTH] IN BLOOD BY AUTOMATED COUNT: 17.2 % (ref 11.5–14.5)
FECAL OB PT: NEGATIVE
GFR SERPLBLD BASED ON 1.73 SQ M-ARVRAT: 43.5 ML/MIN
GLOBULIN SER-MCNC: 3.8 G/DL (ref 2.2–3.8)
GLUCOSE SERPL-MCNC: 165 MG/DL (ref 70–99)
HCO3 BLDA-SCNC: 21 MMOL/L (ref 21–28)
HCT VFR BLD CALC: 26 % (ref 36–47)
HCT VFR BLD CALC: 26.9 % (ref 36–47)
HCT VFR BLD CALC: 28 % (ref 36–47)
HGB BLD-MCNC: 8.6 G/DL (ref 12–15.5)
HGB BLD-MCNC: 8.8 G/DL (ref 12–15.5)
HGB BLD-MCNC: 9.4 G/DL (ref 12–15.5)
INSPIRATION SETTING TIME VENT: (no result)
LYMPHOCYTES # BLD: 0.9 X10^3/UL (ref 1–4.8)
LYMPHOCYTES NFR BLD AUTO: 7 % (ref 24–48)
MCH RBC QN AUTO: 29 PG (ref 25–35)
MCH RBC QN AUTO: 29 PG (ref 25–35)
MCH RBC QN AUTO: 30 PG (ref 25–35)
MCHC RBC AUTO-ENTMCNC: 33 G/DL (ref 31–37)
MCHC RBC AUTO-ENTMCNC: 33 G/DL (ref 31–37)
MCHC RBC AUTO-ENTMCNC: 34 G/DL (ref 31–37)
MCV RBC AUTO: 89 FL (ref 79–100)
MONO #: 0.6 X10^3/UL (ref 0–1.1)
MONOCYTES NFR BLD: 4 % (ref 0–9)
NEUT #: 12.1 X10^3/UL (ref 1.8–7.7)
NEUTROPHILS NFR BLD AUTO: 88 % (ref 31–73)
PCO2 BLDA: 40 MMHG (ref 35–46)
PLATELET # BLD AUTO: 292 X10^3/UL (ref 140–400)
PLATELET # BLD AUTO: 311 X10^3/UL (ref 140–400)
PLATELET # BLD AUTO: 325 X10^3/UL (ref 140–400)
PLATELET # BLD EST: ADEQUATE 10*3/UL
PO2 BLDA: 126 MMHG (ref 75–108)
POTASSIUM SERPL-SCNC: 4.1 MMOL/L (ref 3.5–5.1)
PROT SERPL-MCNC: 5.3 G/DL (ref 6.4–8.2)
RBC # BLD AUTO: 2.93 X10^6/UL (ref 3.5–5.4)
RBC # BLD AUTO: 3.03 X10^6/UL (ref 3.5–5.4)
RBC # BLD AUTO: 3.14 X10^6/UL (ref 3.5–5.4)
SAO2 % BLDA: 98 % (ref 92–99)
SODIUM SERPL-SCNC: 151 MMOL/L (ref 136–145)
TOXIC GRANULES BLD QL SMEAR: PRESENT
WBC # BLD AUTO: 11.8 X10^3/UL (ref 4–11)
WBC # BLD AUTO: 13.8 X10^3/UL (ref 4–11)
WBC # BLD AUTO: 19.8 X10^3/UL (ref 4–11)

## 2020-06-08 PROCEDURE — 0W9B30Z DRAINAGE OF LEFT PLEURAL CAVITY WITH DRAINAGE DEVICE, PERCUTANEOUS APPROACH: ICD-10-PCS | Performed by: RADIOLOGY

## 2020-06-08 RX ADMIN — FENTANYL CITRATE PRN MCG: 50 INJECTION INTRAMUSCULAR; INTRAVENOUS at 18:35

## 2020-06-08 RX ADMIN — DAPTOMYCIN SCH MLS/HR: 500 INJECTION, POWDER, LYOPHILIZED, FOR SOLUTION INTRAVENOUS at 15:33

## 2020-06-08 RX ADMIN — FENTANYL CITRATE PRN MCG: 50 INJECTION INTRAMUSCULAR; INTRAVENOUS at 12:13

## 2020-06-08 RX ADMIN — INSULIN LISPRO SCH UNITS: 100 INJECTION, SOLUTION INTRAVENOUS; SUBCUTANEOUS at 18:27

## 2020-06-08 RX ADMIN — PANTOPRAZOLE SODIUM SCH MG: 40 INJECTION, POWDER, FOR SOLUTION INTRAVENOUS at 07:46

## 2020-06-08 RX ADMIN — ONDANSETRON PRN MG: 2 INJECTION INTRAMUSCULAR; INTRAVENOUS at 10:07

## 2020-06-08 RX ADMIN — BACITRACIN SCH MLS/HR: 5000 INJECTION, POWDER, FOR SOLUTION INTRAMUSCULAR at 02:31

## 2020-06-08 RX ADMIN — BACITRACIN SCH MLS/HR: 5000 INJECTION, POWDER, FOR SOLUTION INTRAMUSCULAR at 06:00

## 2020-06-08 RX ADMIN — FENTANYL CITRATE PRN MCG: 50 INJECTION INTRAMUSCULAR; INTRAVENOUS at 00:52

## 2020-06-08 RX ADMIN — DILTIAZEM HYDROCHLORIDE SCH MLS/HR: 5 INJECTION INTRAVENOUS at 11:50

## 2020-06-08 RX ADMIN — DILTIAZEM HYDROCHLORIDE SCH MLS/HR: 5 INJECTION INTRAVENOUS at 20:55

## 2020-06-08 RX ADMIN — PROCHLORPERAZINE EDISYLATE PRN MG: 5 INJECTION INTRAMUSCULAR; INTRAVENOUS at 10:07

## 2020-06-08 RX ADMIN — INSULIN LISPRO SCH UNITS: 100 INJECTION, SOLUTION INTRAVENOUS; SUBCUTANEOUS at 00:50

## 2020-06-08 RX ADMIN — INSULIN LISPRO SCH UNITS: 100 INJECTION, SOLUTION INTRAVENOUS; SUBCUTANEOUS at 11:57

## 2020-06-08 RX ADMIN — ONDANSETRON PRN MG: 2 INJECTION INTRAMUSCULAR; INTRAVENOUS at 12:03

## 2020-06-08 RX ADMIN — BACITRACIN SCH MLS/HR: 5000 INJECTION, POWDER, FOR SOLUTION INTRAMUSCULAR at 22:33

## 2020-06-08 RX ADMIN — ONDANSETRON PRN MG: 2 INJECTION INTRAMUSCULAR; INTRAVENOUS at 21:08

## 2020-06-08 RX ADMIN — INSULIN LISPRO SCH UNITS: 100 INJECTION, SOLUTION INTRAVENOUS; SUBCUTANEOUS at 06:00

## 2020-06-08 RX ADMIN — BACITRACIN SCH MLS/HR: 5000 INJECTION, POWDER, FOR SOLUTION INTRAMUSCULAR at 16:00

## 2020-06-08 RX ADMIN — FENTANYL CITRATE PRN MCG: 50 INJECTION INTRAMUSCULAR; INTRAVENOUS at 22:29

## 2020-06-08 RX ADMIN — Medication PRN EACH: at 13:14

## 2020-06-08 RX ADMIN — DILTIAZEM HYDROCHLORIDE SCH MLS/HR: 5 INJECTION INTRAVENOUS at 07:48

## 2020-06-08 NOTE — PDOC
SURGICAL PROGRESS NOTE


Subjective


off pressors


drains placed by IR--blood drainage


Vital Signs





Vital Signs








  Date Time  Temp Pulse Resp B/P (MAP) Pulse Ox O2 Delivery O2 Flow Rate FiO2


 


6/8/20 10:00  97 20 99/54 (69) 100 BiPAP/CPAP  


 


6/8/20 08:00 97.7       





 97.7       


 


6/7/20 17:36       15.0 








I&O











Intake and Output 


 


 6/8/20





 07:00


 


Intake Total 66736 ml


 


Output Total 2270 ml


 


Balance 8491 ml


 


 


 


Intake Oral 0 ml


 


IV Total 9666 ml


 


Blood Product IV Normal Saline Flush 1095 ml


 


Output Urine Total 1405 ml


 


Emesis 150 ml


 


Drainage Total 715 ml








General:  Alert, Cooperative


Abdomen:  Soft, Other (drains with bloody drainage)


Labs





Laboratory Tests








Test


 6/6/20


13:06 6/6/20


13:53 6/6/20


16:30 6/6/20


18:12


 


White Blood Count


 16.9 x10^3/uL


(4.0-11.0) 


 


 





 


Red Blood Count


 2.48 x10^6/uL


(3.50-5.40) 


 


 





 


Hemoglobin


 7.2 g/dL


(12.0-15.5) 


 


 





 


Hematocrit


 22.1 %


(36.0-47.0) 


 


 





 


Mean Corpuscular Volume 89 fL ()    


 


Mean Corpuscular Hemoglobin 29 pg (25-35)    


 


Mean Corpuscular Hemoglobin


Concent 33 g/dL


(31-37) 


 


 





 


Red Cell Distribution Width


 19.5 %


(11.5-14.5) 


 


 





 


Platelet Count


 448 x10^3/uL


(140-400) 


 


 





 


Glucose (Fingerstick)


 


 216 mg/dL


(70-99) 


 199 mg/dL


(70-99)


 


O2 Saturation   82 % (92-99)  


 


Arterial Blood pH


 


 


 7.36


(7.35-7.45) 





 


Arterial Blood pCO2 at


Patient Temp 


 


 39 mmHg


(35-46) 





 


Arterial Blood pO2 at Patient


Temp 


 


 53 mmHg


() 





 


Arterial Blood HCO3


 


 


 22 mmol/L


(21-28) 





 


Arterial Blood Base Excess


 


 


 -4 mmol/L


(-3-3) 





 


FiO2   21  


 


Test


 6/6/20


18:24 6/7/20


00:27 6/7/20


05:15 6/7/20


06:50


 


White Blood Count


 18.7 x10^3/uL


(4.0-11.0) 


 20.0 x10^3/uL


(4.0-11.0) 





 


Red Blood Count


 3.07 x10^6/uL


(3.50-5.40) 


 3.06 x10^6/uL


(3.50-5.40) 





 


Hemoglobin


 9.1 g/dL


(12.0-15.5) 


 8.7 g/dL


(12.0-15.5) 





 


Hematocrit


 27.2 %


(36.0-47.0) 


 27.3 %


(36.0-47.0) 





 


Mean Corpuscular Volume 89 fL ()   89 fL ()  


 


Mean Corpuscular Hemoglobin 30 pg (25-35)   29 pg (25-35)  


 


Mean Corpuscular Hemoglobin


Concent 33 g/dL


(31-37) 


 32 g/dL


(31-37) 





 


Red Cell Distribution Width


 18.0 %


(11.5-14.5) 


 18.5 %


(11.5-14.5) 





 


Platelet Count


 476 x10^3/uL


(140-400) 


 451 x10^3/uL


(140-400) 





 


Glucose (Fingerstick)


 


 247 mg/dL


(70-99) 


 





 


Neutrophils (%) (Auto)   88 % (31-73)  


 


Lymphocytes (%) (Auto)   8 % (24-48)  


 


Monocytes (%) (Auto)   3 % (0-9)  


 


Eosinophils (%) (Auto)   0 % (0-3)  


 


Basophils (%) (Auto)   0 % (0-3)  


 


Neutrophils # (Auto)


 


 


 17.6 x10^3/uL


(1.8-7.7) 





 


Lymphocytes # (Auto)


 


 


 1.7 x10^3/uL


(1.0-4.8) 





 


Monocytes # (Auto)


 


 


 0.6 x10^3/uL


(0.0-1.1) 





 


Eosinophils # (Auto)


 


 


 0.0 x10^3/uL


(0.0-0.7) 





 


Basophils # (Auto)


 


 


 0.0 x10^3/uL


(0.0-0.2) 





 


Sodium Level


 


 


 147 mmol/L


(136-145) 





 


Potassium Level


 


 


 5.3 mmol/L


(3.5-5.1) 





 


Chloride Level


 


 


 113 mmol/L


() 





 


Carbon Dioxide Level


 


 


 25 mmol/L


(21-32) 





 


Anion Gap   9 (6-14)  


 


Blood Urea Nitrogen


 


 


 74 mg/dL


(7-20) 





 


Creatinine


 


 


 1.9 mg/dL


(0.6-1.0) 





 


Estimated GFR


(Cockcroft-Gault) 


 


 28.1 


 





 


BUN/Creatinine Ratio   39 (6-20)  


 


Glucose Level


 


 


 241 mg/dL


(70-99) 





 


Calcium Level


 


 


 10.8 mg/dL


(8.5-10.1) 





 


Total Bilirubin


 


 


 0.6 mg/dL


(0.2-1.0) 





 


Aspartate Amino Transf


(AST/SGOT) 


 


 27 U/L (15-37) 


 





 


Alanine Aminotransferase


(ALT/SGPT) 


 


 20 U/L (14-59) 


 





 


Alkaline Phosphatase


 


 


 104 U/L


() 





 


Total Protein


 


 


 6.3 g/dL


(6.4-8.2) 





 


Albumin


 


 


 1.8 g/dL


(3.4-5.0) 





 


Albumin/Globulin Ratio   0.4 (1.0-1.7)  


 


Prothrombin Time


 


 


 


 16.0 SEC


(11.7-14.0)


 


Prothromb Time International


Ratio 


 


 


 1.3 (0.8-1.1) 





 


Activated Partial


Thromboplast Time 


 


 


 32 SEC (24-38) 





 


Lactic Acid Level


 


 


 


 1.7 mmol/L


(0.4-2.0)


 


Test


 6/7/20


08:08 6/7/20


11:30 6/7/20


13:20 6/7/20


18:05


 


Hemoglobin


 8.3 g/dL


(12.0-15.5) 


 9.6 g/dL


(12.0-15.5) 





 


Hematocrit


 26.0 %


(36.0-47.0) 


 28.9 %


(36.0-47.0) 





 


Mean Corpuscular Hemoglobin


Concent 32 g/dL


(31-37) 


 33 g/dL


(31-37) 





 


Urine Collection Type  Unknown   


 


Urine Color  Yellow   


 


Urine Clarity  Cloudy   


 


Urine pH  5.0 (<5.0-8.0)   


 


Urine Specific Gravity


 


 1.020


(1.000-1.030) 


 





 


Urine Protein


 


 30 mg/dL


(NEG-TRACE) 


 





 


Urine Glucose (UA)


 


 Negative mg/dL


(NEG) 


 





 


Urine Ketones (Stick)


 


 Negative mg/dL


(NEG) 


 





 


Urine Blood  Negative (NEG)   


 


Urine Nitrite  Negative (NEG)   


 


Urine Bilirubin  Negative (NEG)   


 


Urine Urobilinogen Dipstick


 


 0.2 mg/dL (0.2


mg/dL) 


 





 


Urine Leukocyte Esterase  Small (NEG)   


 


Urine RBC  0 /HPF (0-2)   


 


Urine WBC


 


 11-20 /HPF


(0-4) 


 





 


Urine Squamous Epithelial


Cells 


 Mod /LPF 


 


 





 


Urine Transitional Epithelial


Cells 


 Mod /LPF 


 


 





 


Urine Amorphous Sediment  Present /HPF   


 


Urine Bacteria


 


 Few /HPF


(0-FEW) 


 





 


Urine Hyaline Casts  Many /HPF   


 


Urine Granular Casts  Many /HPF   


 


Urine Mucus  Marked /LPF   


 


Urine Yeast  Present /HPF   


 


White Blood Count


 


 


 20.9 x10^3/uL


(4.0-11.0) 





 


Red Blood Count


 


 


 3.23 x10^6/uL


(3.50-5.40) 





 


Mean Corpuscular Volume   89 fL ()  


 


Mean Corpuscular Hemoglobin   30 pg (25-35)  


 


Red Cell Distribution Width


 


 


 17.0 %


(11.5-14.5) 





 


Platelet Count


 


 


 433 x10^3/uL


(140-400) 





 


O2 Saturation    95 % (92-99) 


 


Arterial Blood pH


 


 


 


 7.25


(7.35-7.45)


 


Arterial Blood pCO2 at


Patient Temp 


 


 


 48 mmHg


(35-46)


 


Arterial Blood pO2 at Patient


Temp 


 


 


 92 mmHg


()


 


Arterial Blood HCO3


 


 


 


 21 mmol/L


(21-28)


 


Arterial Blood Base Excess


 


 


 


 -6 mmol/L


(-3-3)


 


FiO2    50 


 


Test


 6/7/20


18:30 6/7/20


18:37 6/7/20


20:45 6/8/20


00:30


 


White Blood Count


 21.2 x10^3/uL


(4.0-11.0) 


 


 19.8 x10^3/uL


(4.0-11.0)


 


Red Blood Count


 2.51 x10^6/uL


(3.50-5.40) 


 


 3.14 x10^6/uL


(3.50-5.40)


 


Hemoglobin


 7.4 g/dL


(12.0-15.5) 


 


 9.4 g/dL


(12.0-15.5)


 


Hematocrit


 22.6 %


(36.0-47.0) 


 


 28.0 %


(36.0-47.0)


 


Mean Corpuscular Volume 90 fL ()    89 fL () 


 


Mean Corpuscular Hemoglobin 30 pg (25-35)    30 pg (25-35) 


 


Mean Corpuscular Hemoglobin


Concent 33 g/dL


(31-37) 


 


 34 g/dL


(31-37)


 


Red Cell Distribution Width


 17.3 %


(11.5-14.5) 


 


 16.8 %


(11.5-14.5)


 


Platelet Count


 310 x10^3/uL


(140-400) 


 


 325 x10^3/uL


(140-400)


 


Glucose (Fingerstick)


 


 234 mg/dL


(70-99) 


 





 


O2 Saturation   97 % (92-99)  


 


Arterial Blood pH


 


 


 7.27


(7.35-7.45) 





 


Arterial Blood pCO2 at


Patient Temp 


 


 44 mmHg


(35-46) 





 


Arterial Blood pO2 at Patient


Temp 


 


 108 mmHg


() 





 


Arterial Blood HCO3


 


 


 20 mmol/L


(21-28) 





 


Arterial Blood Base Excess


 


 


 -7 mmol/L


(-3-3) 





 


FiO2   40  


 


Test


 6/8/20


00:45 6/8/20


06:50 6/8/20


06:59 6/8/20


08:15


 


Glucose (Fingerstick)


 320 mg/dL


(70-99) 


 149 mg/dL


(70-99) 





 


White Blood Count


 


 13.8 x10^3/uL


(4.0-11.0) 


 





 


Red Blood Count


 


 3.03 x10^6/uL


(3.50-5.40) 


 





 


Hemoglobin


 


 8.8 g/dL


(12.0-15.5) 


 





 


Hematocrit


 


 26.9 %


(36.0-47.0) 


 





 


Mean Corpuscular Volume  89 fL ()   


 


Mean Corpuscular Hemoglobin  29 pg (25-35)   


 


Mean Corpuscular Hemoglobin


Concent 


 33 g/dL


(31-37) 


 





 


Red Cell Distribution Width


 


 17.2 %


(11.5-14.5) 


 





 


Platelet Count


 


 311 x10^3/uL


(140-400) 


 





 


Neutrophils (%) (Auto)  88 % (31-73)   


 


Lymphocytes (%) (Auto)  7 % (24-48)   


 


Monocytes (%) (Auto)  4 % (0-9)   


 


Eosinophils (%) (Auto)  1 % (0-3)   


 


Basophils (%) (Auto)  0 % (0-3)   


 


Neutrophils # (Auto)


 


 12.1 x10^3/uL


(1.8-7.7) 


 





 


Lymphocytes # (Auto)


 


 0.9 x10^3/uL


(1.0-4.8) 


 





 


Monocytes # (Auto)


 


 0.6 x10^3/uL


(0.0-1.1) 


 





 


Eosinophils # (Auto)


 


 0.1 x10^3/uL


(0.0-0.7) 


 





 


Basophils # (Auto)


 


 0.0 x10^3/uL


(0.0-0.2) 


 





 


Segmented Neutrophils %  63 % (35-66)   


 


Band Neutrophils %  25 % (0-9)   


 


Lymphocytes %  8 % (24-48)   


 


Monocytes %  4 % (0-10)   


 


Toxic Granulation  Present   


 


Dohle Bodies  Present   


 


Platelet Estimate


 


 Adequate


(ADEQUATE) 


 





 


Anisocytosis  Slight   


 


Sodium Level


 


 151 mmol/L


(136-145) 


 





 


Potassium Level


 


 4.1 mmol/L


(3.5-5.1) 


 





 


Chloride Level


 


 118 mmol/L


() 


 





 


Carbon Dioxide Level


 


 25 mmol/L


(21-32) 


 





 


Anion Gap  8 (6-14)   


 


Blood Urea Nitrogen


 


 58 mg/dL


(7-20) 


 





 


Creatinine


 


 1.3 mg/dL


(0.6-1.0) 


 





 


Estimated GFR


(Cockcroft-Gault) 


 43.5 


 


 





 


BUN/Creatinine Ratio  45 (6-20)   


 


Glucose Level


 


 165 mg/dL


(70-99) 


 





 


Calcium Level


 


 9.9 mg/dL


(8.5-10.1) 


 





 


Total Bilirubin


 


 0.5 mg/dL


(0.2-1.0) 


 





 


Aspartate Amino Transf


(AST/SGOT) 


 15 U/L (15-37) 


 


 





 


Alanine Aminotransferase


(ALT/SGPT) 


 12 U/L (14-59) 


 


 





 


Alkaline Phosphatase


 


 95 U/L


() 


 





 


Total Protein


 


 5.3 g/dL


(6.4-8.2) 


 





 


Albumin


 


 1.5 g/dL


(3.4-5.0) 


 





 


Albumin/Globulin Ratio  0.4 (1.0-1.7)   


 


O2 Saturation    98 % (92-99) 


 


Arterial Blood pH


 


 


 


 7.34


(7.35-7.45)


 


Arterial Blood pCO2 at


Patient Temp 


 


 


 40 mmHg


(35-46)


 


Arterial Blood pO2 at Patient


Temp 


 


 


 126 mmHg


()


 


Arterial Blood HCO3


 


 


 


 21 mmol/L


(21-28)


 


Arterial Blood Base Excess


 


 


 


 -5 mmol/L


(-3-3)


 


FiO2    40% bipap 








Laboratory Tests








Test


 6/7/20


11:30 6/7/20


13:20 6/7/20


18:05 6/7/20


18:30


 


Urine Collection Type Unknown    


 


Urine Color Yellow    


 


Urine Clarity Cloudy    


 


Urine pH 5.0 (<5.0-8.0)    


 


Urine Specific Gravity


 1.020


(1.000-1.030) 


 


 





 


Urine Protein


 30 mg/dL


(NEG-TRACE) 


 


 





 


Urine Glucose (UA)


 Negative mg/dL


(NEG) 


 


 





 


Urine Ketones (Stick)


 Negative mg/dL


(NEG) 


 


 





 


Urine Blood Negative (NEG)    


 


Urine Nitrite Negative (NEG)    


 


Urine Bilirubin Negative (NEG)    


 


Urine Urobilinogen Dipstick


 0.2 mg/dL (0.2


mg/dL) 


 


 





 


Urine Leukocyte Esterase Small (NEG)    


 


Urine RBC 0 /HPF (0-2)    


 


Urine WBC


 11-20 /HPF


(0-4) 


 


 





 


Urine Squamous Epithelial


Cells Mod /LPF 


 


 


 





 


Urine Transitional Epithelial


Cells Mod /LPF 


 


 


 





 


Urine Amorphous Sediment Present /HPF    


 


Urine Bacteria


 Few /HPF


(0-FEW) 


 


 





 


Urine Hyaline Casts Many /HPF    


 


Urine Granular Casts Many /HPF    


 


Urine Mucus Marked /LPF    


 


Urine Yeast Present /HPF    


 


White Blood Count


 


 20.9 x10^3/uL


(4.0-11.0) 


 21.2 x10^3/uL


(4.0-11.0)


 


Red Blood Count


 


 3.23 x10^6/uL


(3.50-5.40) 


 2.51 x10^6/uL


(3.50-5.40)


 


Hemoglobin


 


 9.6 g/dL


(12.0-15.5) 


 7.4 g/dL


(12.0-15.5)


 


Hematocrit


 


 28.9 %


(36.0-47.0) 


 22.6 %


(36.0-47.0)


 


Mean Corpuscular Volume  89 fL ()   90 fL () 


 


Mean Corpuscular Hemoglobin  30 pg (25-35)   30 pg (25-35) 


 


Mean Corpuscular Hemoglobin


Concent 


 33 g/dL


(31-37) 


 33 g/dL


(31-37)


 


Red Cell Distribution Width


 


 17.0 %


(11.5-14.5) 


 17.3 %


(11.5-14.5)


 


Platelet Count


 


 433 x10^3/uL


(140-400) 


 310 x10^3/uL


(140-400)


 


O2 Saturation   95 % (92-99)  


 


Arterial Blood pH


 


 


 7.25


(7.35-7.45) 





 


Arterial Blood pCO2 at


Patient Temp 


 


 48 mmHg


(35-46) 





 


Arterial Blood pO2 at Patient


Temp 


 


 92 mmHg


() 





 


Arterial Blood HCO3


 


 


 21 mmol/L


(21-28) 





 


Arterial Blood Base Excess


 


 


 -6 mmol/L


(-3-3) 





 


FiO2   50  


 


Test


 6/7/20


18:37 6/7/20


20:45 6/8/20


00:30 6/8/20


00:45


 


Glucose (Fingerstick)


 234 mg/dL


(70-99) 


 


 320 mg/dL


(70-99)


 


O2 Saturation  97 % (92-99)   


 


Arterial Blood pH


 


 7.27


(7.35-7.45) 


 





 


Arterial Blood pCO2 at


Patient Temp 


 44 mmHg


(35-46) 


 





 


Arterial Blood pO2 at Patient


Temp 


 108 mmHg


() 


 





 


Arterial Blood HCO3


 


 20 mmol/L


(21-28) 


 





 


Arterial Blood Base Excess


 


 -7 mmol/L


(-3-3) 


 





 


FiO2  40   


 


White Blood Count


 


 


 19.8 x10^3/uL


(4.0-11.0) 





 


Red Blood Count


 


 


 3.14 x10^6/uL


(3.50-5.40) 





 


Hemoglobin


 


 


 9.4 g/dL


(12.0-15.5) 





 


Hematocrit


 


 


 28.0 %


(36.0-47.0) 





 


Mean Corpuscular Volume   89 fL ()  


 


Mean Corpuscular Hemoglobin   30 pg (25-35)  


 


Mean Corpuscular Hemoglobin


Concent 


 


 34 g/dL


(31-37) 





 


Red Cell Distribution Width


 


 


 16.8 %


(11.5-14.5) 





 


Platelet Count


 


 


 325 x10^3/uL


(140-400) 





 


Test


 6/8/20


06:50 6/8/20


06:59 6/8/20


08:15 





 


White Blood Count


 13.8 x10^3/uL


(4.0-11.0) 


 


 





 


Red Blood Count


 3.03 x10^6/uL


(3.50-5.40) 


 


 





 


Hemoglobin


 8.8 g/dL


(12.0-15.5) 


 


 





 


Hematocrit


 26.9 %


(36.0-47.0) 


 


 





 


Mean Corpuscular Volume 89 fL ()    


 


Mean Corpuscular Hemoglobin 29 pg (25-35)    


 


Mean Corpuscular Hemoglobin


Concent 33 g/dL


(31-37) 


 


 





 


Red Cell Distribution Width


 17.2 %


(11.5-14.5) 


 


 





 


Platelet Count


 311 x10^3/uL


(140-400) 


 


 





 


Neutrophils (%) (Auto) 88 % (31-73)    


 


Lymphocytes (%) (Auto) 7 % (24-48)    


 


Monocytes (%) (Auto) 4 % (0-9)    


 


Eosinophils (%) (Auto) 1 % (0-3)    


 


Basophils (%) (Auto) 0 % (0-3)    


 


Neutrophils # (Auto)


 12.1 x10^3/uL


(1.8-7.7) 


 


 





 


Lymphocytes # (Auto)


 0.9 x10^3/uL


(1.0-4.8) 


 


 





 


Monocytes # (Auto)


 0.6 x10^3/uL


(0.0-1.1) 


 


 





 


Eosinophils # (Auto)


 0.1 x10^3/uL


(0.0-0.7) 


 


 





 


Basophils # (Auto)


 0.0 x10^3/uL


(0.0-0.2) 


 


 





 


Segmented Neutrophils % 63 % (35-66)    


 


Band Neutrophils % 25 % (0-9)    


 


Lymphocytes % 8 % (24-48)    


 


Monocytes % 4 % (0-10)    


 


Toxic Granulation Present    


 


Dohle Bodies Present    


 


Platelet Estimate


 Adequate


(ADEQUATE) 


 


 





 


Anisocytosis Slight    


 


Sodium Level


 151 mmol/L


(136-145) 


 


 





 


Potassium Level


 4.1 mmol/L


(3.5-5.1) 


 


 





 


Chloride Level


 118 mmol/L


() 


 


 





 


Carbon Dioxide Level


 25 mmol/L


(21-32) 


 


 





 


Anion Gap 8 (6-14)    


 


Blood Urea Nitrogen


 58 mg/dL


(7-20) 


 


 





 


Creatinine


 1.3 mg/dL


(0.6-1.0) 


 


 





 


Estimated GFR


(Cockcroft-Gault) 43.5 


 


 


 





 


BUN/Creatinine Ratio 45 (6-20)    


 


Glucose Level


 165 mg/dL


(70-99) 


 


 





 


Calcium Level


 9.9 mg/dL


(8.5-10.1) 


 


 





 


Total Bilirubin


 0.5 mg/dL


(0.2-1.0) 


 


 





 


Aspartate Amino Transf


(AST/SGOT) 15 U/L (15-37) 


 


 


 





 


Alanine Aminotransferase


(ALT/SGPT) 12 U/L (14-59) 


 


 


 





 


Alkaline Phosphatase


 95 U/L


() 


 


 





 


Total Protein


 5.3 g/dL


(6.4-8.2) 


 


 





 


Albumin


 1.5 g/dL


(3.4-5.0) 


 


 





 


Albumin/Globulin Ratio 0.4 (1.0-1.7)    


 


Glucose (Fingerstick)


 


 149 mg/dL


(70-99) 


 





 


O2 Saturation   98 % (92-99)  


 


Arterial Blood pH


 


 


 7.34


(7.35-7.45) 





 


Arterial Blood pCO2 at


Patient Temp 


 


 40 mmHg


(35-46) 





 


Arterial Blood pO2 at Patient


Temp 


 


 126 mmHg


() 





 


Arterial Blood HCO3


 


 


 21 mmol/L


(21-28) 





 


Arterial Blood Base Excess


 


 


 -5 mmol/L


(-3-3) 





 


FiO2   40% bipap  








Problem List


Problems


Medical Problems:


(1) Acute pancreatitis


Status: Acute  





(2) Cholelithiasis


Status: Acute  








Assessment/Plan


supportive care


will d/w Dr Flores





Justicifation of Admission Dx:


Justifications for Admission:


Justification of Admission Dx:  Yes











SAURAV NSAH APRN             Jun 8, 2020 10:52

## 2020-06-08 NOTE — RAD
EXAM: Chest, single view.

 

HISTORY: Aspiration.

 

COMPARISON: 6/6/2020

 

FINDINGS: There has been slight interval increase in diffuse left lung 

infiltrate superimposed on a stable small left pleural effusion. There is 

a stable suspected tiny right pleural effusion and mild right central 

predominant interstitial prominence. The heart is normal in size. There is

a tracheostomy device in expected position. There is a nasogastric tube 

within the stomach. There is a left PICC with the tip in the superior 

cavoatrial junction. No pneumothorax is seen.

 

IMPRESSION: 

1. Suspected slight interval increase in diffuse left lung infiltrate.

2. Stable small left greater than right pleural effusions and central 

right interstitial prominence.

3. Stable support lines and tubes.

 

Electronically signed by: Irish Rios MD (6/8/2020 8:47 AM) [id number]

## 2020-06-08 NOTE — PDOC
Subjective:


Subjective:


"So-so."


Asks for her call light.





Objective:


Objective:


D/w nurse and reviewed chart - drainage from drain sites over weekend, vomiting 

green bile, IR procedure yesterday, no blood in stools.


Off pressors, Hgb 9.4 to 8.8 today.


Vital Signs:





                                   Vital Signs








  Date Time  Temp Pulse Resp B/P (MAP) Pulse Ox O2 Delivery O2 Flow Rate FiO2


 


6/8/20 10:00  97 20 99/54 (69) 100 BiPAP/CPAP  


 


6/8/20 08:00 97.7       





 97.7       


 


6/7/20 17:36       15.0 








Labs:





Laboratory Tests








Test


 6/7/20


11:30 6/7/20


13:20 6/7/20


18:05 6/7/20


18:30


 


Urine Collection Type Unknown    


 


Urine Color Yellow    


 


Urine Clarity Cloudy    


 


Urine pH 5.0    


 


Urine Specific Gravity 1.020    


 


Urine Protein 30 mg/dL    


 


Urine Glucose (UA) Negative mg/dL    


 


Urine Ketones (Stick) Negative mg/dL    


 


Urine Blood Negative    


 


Urine Nitrite Negative    


 


Urine Bilirubin Negative    


 


Urine Urobilinogen Dipstick 0.2 mg/dL    


 


Urine Leukocyte Esterase Small    


 


Urine RBC 0 /HPF    


 


Urine WBC 11-20 /HPF    


 


Urine Squamous Epithelial


Cells Mod /LPF 


 


 


 





 


Urine Transitional Epithelial


Cells Mod /LPF 


 


 


 





 


Urine Amorphous Sediment Present /HPF    


 


Urine Bacteria Few /HPF    


 


Urine Hyaline Casts Many /HPF    


 


Urine Granular Casts Many /HPF    


 


Urine Mucus Marked /LPF    


 


Urine Yeast Present /HPF    


 


White Blood Count  20.9 x10^3/uL   21.2 x10^3/uL 


 


Red Blood Count  3.23 x10^6/uL   2.51 x10^6/uL 


 


Hemoglobin  9.6 g/dL   7.4 g/dL 


 


Hematocrit  28.9 %   22.6 % 


 


Mean Corpuscular Volume  89 fL   90 fL 


 


Mean Corpuscular Hemoglobin  30 pg   30 pg 


 


Mean Corpuscular Hemoglobin


Concent 


 33 g/dL 


 


 33 g/dL 





 


Red Cell Distribution Width  17.0 %   17.3 % 


 


Platelet Count  433 x10^3/uL   310 x10^3/uL 


 


O2 Saturation   95 %  


 


Arterial Blood pH   7.25  


 


Arterial Blood pCO2 at


Patient Temp 


 


 48 mmHg 


 





 


Arterial Blood pO2 at Patient


Temp 


 


 92 mmHg 


 





 


Arterial Blood HCO3   21 mmol/L  


 


Arterial Blood Base Excess   -6 mmol/L  


 


FiO2   50  


 


Test


 6/7/20


18:37 6/7/20


20:45 6/8/20


00:30 6/8/20


00:45


 


Glucose (Fingerstick) 234 mg/dL    320 mg/dL 


 


O2 Saturation  97 %   


 


Arterial Blood pH  7.27   


 


Arterial Blood pCO2 at


Patient Temp 


 44 mmHg 


 


 





 


Arterial Blood pO2 at Patient


Temp 


 108 mmHg 


 


 





 


Arterial Blood HCO3  20 mmol/L   


 


Arterial Blood Base Excess  -7 mmol/L   


 


FiO2  40   


 


White Blood Count   19.8 x10^3/uL  


 


Red Blood Count   3.14 x10^6/uL  


 


Hemoglobin   9.4 g/dL  


 


Hematocrit   28.0 %  


 


Mean Corpuscular Volume   89 fL  


 


Mean Corpuscular Hemoglobin   30 pg  


 


Mean Corpuscular Hemoglobin


Concent 


 


 34 g/dL 


 





 


Red Cell Distribution Width   16.8 %  


 


Platelet Count   325 x10^3/uL  


 


Test


 6/8/20


06:50 6/8/20


06:59 6/8/20


08:15 





 


White Blood Count 13.8 x10^3/uL    


 


Red Blood Count 3.03 x10^6/uL    


 


Hemoglobin 8.8 g/dL    


 


Hematocrit 26.9 %    


 


Mean Corpuscular Volume 89 fL    


 


Mean Corpuscular Hemoglobin 29 pg    


 


Mean Corpuscular Hemoglobin


Concent 33 g/dL 


 


 


 





 


Red Cell Distribution Width 17.2 %    


 


Platelet Count 311 x10^3/uL    


 


Neutrophils (%) (Auto) 88 %    


 


Lymphocytes (%) (Auto) 7 %    


 


Monocytes (%) (Auto) 4 %    


 


Eosinophils (%) (Auto) 1 %    


 


Basophils (%) (Auto) 0 %    


 


Neutrophils # (Auto) 12.1 x10^3/uL    


 


Lymphocytes # (Auto) 0.9 x10^3/uL    


 


Monocytes # (Auto) 0.6 x10^3/uL    


 


Eosinophils # (Auto) 0.1 x10^3/uL    


 


Basophils # (Auto) 0.0 x10^3/uL    


 


Segmented Neutrophils % 63 %    


 


Band Neutrophils % 25 %    


 


Lymphocytes % 8 %    


 


Monocytes % 4 %    


 


Toxic Granulation Present    


 


Dohle Bodies Present    


 


Platelet Estimate Adequate    


 


Anisocytosis Slight    


 


Sodium Level 151 mmol/L    


 


Potassium Level 4.1 mmol/L    


 


Chloride Level 118 mmol/L    


 


Carbon Dioxide Level 25 mmol/L    


 


Anion Gap 8    


 


Blood Urea Nitrogen 58 mg/dL    


 


Creatinine 1.3 mg/dL    


 


Estimated GFR


(Cockcroft-Gault) 43.5 


 


 


 





 


BUN/Creatinine Ratio 45    


 


Glucose Level 165 mg/dL    


 


Calcium Level 9.9 mg/dL    


 


Total Bilirubin 0.5 mg/dL    


 


Aspartate Amino Transf


(AST/SGOT) 15 U/L 


 


 


 





 


Alanine Aminotransferase


(ALT/SGPT) 12 U/L 


 


 


 





 


Alkaline Phosphatase 95 U/L    


 


Total Protein 5.3 g/dL    


 


Albumin 1.5 g/dL    


 


Albumin/Globulin Ratio 0.4    


 


Glucose (Fingerstick)  149 mg/dL   


 


O2 Saturation   98 %  


 


Arterial Blood pH   7.34  


 


Arterial Blood pCO2 at


Patient Temp 


 


 40 mmHg 


 





 


Arterial Blood pO2 at Patient


Temp 


 


 126 mmHg 


 





 


Arterial Blood HCO3   21 mmol/L  


 


Arterial Blood Base Excess   -5 mmol/L  


 


FiO2   40% bipap  








Imaging:


CXR 6/8


IMPRESSION: 


1. Suspected slight interval increase in diffuse left lung infiltrate.


2. Stable small left greater than right pleural effusions and central right 

interstitial prominence.


3. Stable support lines and tubes.


 


IR procedure 6/7


IMPRESSION: CT-guided intra-abdominal drainage catheter placement x3 for multi 

focal intraabdominal fluid collections, concerning for abscesses. 





CT A/P 6/6


IMPRESSION:


1. Removal of the percutaneous pigtail drainage catheters since the prior exam. 

Sequela of pancreatitis with extensive pseudocysts again demonstrated, the 

right-sided collections are slightly larger since the prior exam, the left-sided

collections are stable. See above.


2. Moderate to large left pleural effusion with atelectasis and collapse of most

of the left lower lobe, stable. Small right pleural effusion is stable.


3. Gallstone.





PE:





GEN:ill


LUNGS: trach/BiPAP mode


HEART: RRR


ABD:three drains - dark red output (more red on right)


NEURO/PSYCH: awake/alert





A/P:


Pancreatitis, MOSF


Anemia, s/p IR drain replacement 6/7





--


Other per Dr. Bhatia.





Justicifation of Admission Dx:


Justifications for Admission:


Justification of Admission Dx:  Yes











RAGHAVENDRA MURCIA          Jun 8, 2020 11:15

## 2020-06-08 NOTE — RAD
Note: This report was dictated by Dr. Stratton for Dr. Parker who was the

operating physician



CT-guided drain placement, 3 separate intra-abdominal fluid collections



Indication:



Clinical Indication:   History of severe pancreatitis with multifocal

intraperitoneal fluid collections. Patient presents with signs and symptoms of

sepsis. Emergent drainage requested.



Discussion:

 

 The procedure was explained in its entirety to the patient or the patients

designated representative by a member of the treatment team, including a

discussion of the risks, benefits and commonly accepted alternatives to the

procedure, as well as the expected consequences of no therapy whatsoever.  

Discussion of the risks included, but was not limited to, those that are most

frequent and those that are rare but possibly severe or life-threatening, as

well as the possibility of unforeseen complications.



  All elements of maximal sterile barrier technique including the use of a

cap, mask, sterile gown, sterile gloves, large sterile sheet, appropriate hand

hygiene, and 2% chlorhexidine for cutaneous antisepsis (or acceptable

alternative antiseptic per current guidelines) were followed for this

procedure.



CT imaging was performed redemonstrating multiple intraperitoneal fluid

collections including, larger fluid collections in the right and left abdomen,

as well as a peripancreatic fluid collection. The abdomen was prepped and

draped as described. 1% lidocaine was administered to the skin and

subcutaneous tissues overlying the fluid collections. Under intermittent CT

guidance, needles were advanced into these collections. Guidewires were

advanced into the collections, over which, following dilatation 14 [place]

drainage catheters were placed. Approximately 100 cc was aspirated from the

right lower quadrant collection. Approximately 250 cc was aspirated from the

left upper quadrant collection. Approximately 25 cc was aspirated from the

peripancreatic collection. Aspirates were relatively serosanguineous however

some residual, thick or multiloculated fluid appears to be present on

subsequent CT imaging. Catheters were secured in place and sterile dressings

were applied. No immediate complications were identified. 



The procedure was performed under conscious sedation including continuous

cardiopulmonary monitoring via dedicated sedation nurse. Face-to-face sedation

time: 130 minutes.



IMPRESSION: CT-guided intra-abdominal drainage catheter placement x3 for multi

focal intraabdominal fluid collections, concerning for abscesses. 











PQRS Compliance Statement:



One or more of the following individualized dose reduction techniques were

utilized for this examination:

1. Automated exposure control

2. Adjustment of the mA and/or kV according to patient size

3. Use of iterative reconstruction technique

fluoroscopy time:  min

Dose area product: Gycm2





Note: This report was dictated by Dr. Stratton for Dr. Parker who was the

operating physician

## 2020-06-08 NOTE — PDOC
PROGRESS NOTES


Chief Complaint


Chief Complaint


A/P:


Acute hypoxic Respiratory failure requiring mechanical ventilation (now 

extubated for several days but still with tracheostomy)


Tracheostomy


bilateral pleural effusions/pulm edema 


Sepsis


Severe Acute gallstone pancreatitis (not a surgical candidate at this time) with

necrosis


Acute kidney failure now requiring dialysis


Salpingitis


Gallstones (Calculus of gallbladder with acute cholecystitis without 

obstruction)


HTN 


Leukocytosis 


Hypoxia


Uterine fibroid


Intractable pain


Intractable nausea


Covid 19 negative. 


Acute on chronic anemia 


EEG: No seizure activityFever  - better currently - intermittent could be from 

underlying pancreatitis blood cults 5/4 - neg so far


? Ileus with vomiting


Abd distention - U/S and CT reviewed s/p 0.4 L of opaque, debris-containing 

ascites was removed 5/6


Acute pancreatitis with persistent necrosis


- 4/27 status post ROBERT drain placement + C paropsilosis. s/p additional drains 5 /8


Anemia - S/p PRBCs


Cholelithiasis with thickening of the gallbladder wall.


Leucocytosis improving


JED, hyperkalemia, Metabolic acidosis off dialysis


Acute hypoxic resp failure ,bilateral pleural effusion and atelectasis


hypocalcemia 


Prediabetes


HTN


s/p trach


ESRD on HD


Hyperglycemia





History of Present Illness


History of Present Illness


IR placed drain on 6/7. 4u PRBC after Hb drop. Hb 8.8 today. Off Levophed this 

morning. T-max 100.3. Much more lethargic today.





6/5/2020


Patient seen and examined on telemetry floor today


This morning I had a couple of discussions with case management


The issue is the patient will not wear her trach cap


Without that she cannot advance her diet and is currently supposed to be on 

honey thick liquids


I was going to talk to the patient about wearing her trach But when I arrived to

the room she was lying on the floor with several nurses and therapist around


Apparently she did walk to the end of the garsia then back and then collapsed onto

the floor


She had a bowel movement when this all happened


Appears to be critically ill again


Very shaky tremulous anxious has a stare in her eyes


We got her back in bed


I am extremely concerned about her long-term prognosis again











6/4/2020


Patient seen and examined in the ICU


She remains critically ill is is extremely weak


Chart reviewed


Discussed with RN


We decided to try some Prozac as she does seem depressed


On IV TPN


Still has NG tube








6/3/2020


Patient seen and examined in the ICU


She remains critically ill


We have been trying to hold off on her Ativan for the past 24 hours


She is a little more awake but shaky and agitated and anxious seems depressed


Discussed with RN


Chart reviewed








6/2/2020


Patient seen and examined in the ICU


She is a little more alert today but still quite ill


Appears clammy and pale and depressed/anxious


Discussed with RN


Chart reviewed











6/1/2020


Patient seen and examined in the ICU


She appears extremely ill


She is tachypneic at 35 respirations per minute and tachycardic at 132 bpm


She is extremely encephalopathic and shaky


She appears clammy


Chart reviewed


Discussed with RN


Prognosis extremely guarded at best








5/31/2020


Patient still in ICU


Resting with no apparent distress


Chart reviewed








5/30/2020


Patient seen and examined in the ICU


She is wiping her face with a cough


Discussed with RN


Chart reviewed


We hope to get her out of the ICU later today if possible








5/29/2020


Patient seen and examined in the ICU once again


She is back on NG suction


On IV Zosyn


Has IV TPN


Sedated with Precedex but anxious still


Appears somewhat clammy and pale


Chart reviewed


Discussed with RN


She remains critically ill














5/28/2020


Patient seen and examined in the ICU


She has an NG that is clamped we are hoping to start some clear liquids but she 

looks quite ill


She is on IV TPN


Meropenem changed to IV Zosyn (agree)


She has [place] to bedside drainage


She is semi-sedated with Precedex


Chart reviewed


Discussed with RN


She remains critically ill











Seen bedside. Hb 8. She was just a bit hypoxic, had a mucous plug suctioned. 

Able to vocalize well with speaking valve, tells me we are being very hard on 

her and she would like to be drugged back to sleep.  She wants to wake up and 

feel better. On trach shield, 


T max 100.4. afebrile this a.m.





5/26/2020


Patient seen and examined in the ICU


She is up in the chair very frail trying to talk a little shaky


Discussed with RN


Chart reviewed








5/25/2020


Patient seen and examined in the ICU


Patient up in the chair


Having a severe coughing episode with a lot of phlegm coming out of her 

tracheostomy


Discussed with RN


Discussed with physical therapy


Chart reviewed











5/24,.    feels well,  has been out of room in wheelchair


no complaint,    still weak,   some with not wanting to wear her valve on trach


cont other,   may be able to work with speech tomorrow,    ketty OK to 

try, 








5/23,  anxiety is up today,   she dislikes the valve still,    


shower and outside today


.   





5/22 she doesnt want to wear her passy-kristan valve,  discussed str and plan with 

her.


speech following,  needs swallow study,   but needs to wear her valve longer,  


cont current


able to walk some, walker 





5/21  stronger,   better,   


we discussed better oral care


she would like to try swallow study,  wants to try to eat,    speech is 

following








5/20/2020 


She remains in the ICU


sitting up and working with OT,   getting better


if limit pain meds, may do better off the vent, 





Nurses trying to suction her,  that is also improved, 


Chart reviewed


 








5/19/2020


Patient seen and examined in the ICU


She had an episode yesterday of tachycardia and severe agitation we gave her 

some Ativan


After that she seemed to have stroke symptoms but now that the Ativan has wore 

off her stroke symptoms have resolved


 


 


She is on IV meropenem and daptomycin and micafungin


Chart reviewed


Discussed with RN


Patient is still critically ill


 


BRIEF OPERATIVE NOTE


Pre-Op Diagnosis


Pancreatitis with pseudocysts, suspected infection


Post-Op Diagnosis


same


Procedure Performed


CT abdominal Drains x 3


Surgeon


Hardy


Anesthesia Type:  Conscious Sedation


Findings


3 abdominal drains, 14F, with turbid pancreatic fluid and necrotic debris in 

each.


Complications


No immediate








5/9: Patient today somewhat restless and having bilious secretions from ET tube,

imaging studies ordered, discussed with consultant. Pretty poor prognosis, 

hopefully is not a fistula, poor surgical candidate. 





5/10: Imaging with no acute events, she seems more stable today compared to 

yesterday. Encouraged as much activity as possible patient at high risk for 

severe depression.





Vitals


Vitals





Vital Signs








  Date Time  Temp Pulse Resp B/P (MAP) Pulse Ox O2 Delivery O2 Flow Rate FiO2


 


6/8/20 06:00  96 18 124/64 (84) 100 BiPAP/CPAP  


 


6/8/20 04:00 98.8       





 98.8       


 


6/7/20 17:36       15.0 











Physical Exam


Physical Exam


GENERAL: Propped up in bed, lethargic, weak 


HEENT: NGT in place. Oral mucosa dry


NECK:  Tracheostomy 


LUNGS: Diminished aeration bases, no accessory muscle use 


HEART:  S1, S2, tachy, regular 


ABDOMEN: Mild distention, bowel sounds present, soft, grimaces to palpation 


Right lateral side somewhat firm and bleeding from previous drain site.  


: [place] (4/14) 


EXTREMITIES: Trace edema or cyanosis 


SKIN: Pale, no signs of rash 


CNS: Lethargic 


LUE-PICC (5/29) without signs of complications


General:  Alert, Cooperative, No acute distress


Heart:  Regular rate, Normal S1, Normal S2, No murmurs, Gallops


Lungs:  Other (diminshed in bases, Rhonci in LLL)


Abdomen:  Soft, No tenderness


Extremities:  No clubbing, No cyanosis, No edema, Normal pulses, No tenderne

ss/swelling


Skin:  Other (warm, dry)





Labs


LABS





Laboratory Tests








Test


 6/7/20


08:08 6/7/20


11:30 6/7/20


13:20 6/7/20


18:05


 


Hemoglobin


 8.3 g/dL


(12.0-15.5) 


 9.6 g/dL


(12.0-15.5) 





 


Hematocrit


 26.0 %


(36.0-47.0) 


 28.9 %


(36.0-47.0) 





 


Mean Corpuscular Hemoglobin


Concent 32 g/dL


(31-37) 


 33 g/dL


(31-37) 





 


Urine Collection Type  Unknown   


 


Urine Color  Yellow   


 


Urine Clarity  Cloudy   


 


Urine pH  5.0 (<5.0-8.0)   


 


Urine Specific Gravity


 


 1.020


(1.000-1.030) 


 





 


Urine Protein


 


 30 mg/dL


(NEG-TRACE) 


 





 


Urine Glucose (UA)


 


 Negative mg/dL


(NEG) 


 





 


Urine Ketones (Stick)


 


 Negative mg/dL


(NEG) 


 





 


Urine Blood  Negative (NEG)   


 


Urine Nitrite  Negative (NEG)   


 


Urine Bilirubin  Negative (NEG)   


 


Urine Urobilinogen Dipstick


 


 0.2 mg/dL (0.2


mg/dL) 


 





 


Urine Leukocyte Esterase  Small (NEG)   


 


Urine RBC  0 /HPF (0-2)   


 


Urine WBC


 


 11-20 /HPF


(0-4) 


 





 


Urine Squamous Epithelial


Cells 


 Mod /LPF 


 


 





 


Urine Transitional Epithelial


Cells 


 Mod /LPF 


 


 





 


Urine Amorphous Sediment  Present /HPF   


 


Urine Bacteria


 


 Few /HPF


(0-FEW) 


 





 


Urine Hyaline Casts  Many /HPF   


 


Urine Granular Casts  Many /HPF   


 


Urine Mucus  Marked /LPF   


 


Urine Yeast  Present /HPF   


 


White Blood Count


 


 


 20.9 x10^3/uL


(4.0-11.0) 





 


Red Blood Count


 


 


 3.23 x10^6/uL


(3.50-5.40) 





 


Mean Corpuscular Volume   89 fL ()  


 


Mean Corpuscular Hemoglobin   30 pg (25-35)  


 


Red Cell Distribution Width


 


 


 17.0 %


(11.5-14.5) 





 


Platelet Count


 


 


 433 x10^3/uL


(140-400) 





 


O2 Saturation    95 % (92-99) 


 


Arterial Blood pH


 


 


 


 7.25


(7.35-7.45)


 


Arterial Blood pCO2 at


Patient Temp 


 


 


 48 mmHg


(35-46)


 


Arterial Blood pO2 at Patient


Temp 


 


 


 92 mmHg


()


 


Arterial Blood HCO3


 


 


 


 21 mmol/L


(21-28)


 


Arterial Blood Base Excess


 


 


 


 -6 mmol/L


(-3-3)


 


FiO2    50 


 


Test


 6/7/20


18:30 6/7/20


18:37 6/7/20


20:45 6/8/20


00:30


 


White Blood Count


 21.2 x10^3/uL


(4.0-11.0) 


 


 19.8 x10^3/uL


(4.0-11.0)


 


Red Blood Count


 2.51 x10^6/uL


(3.50-5.40) 


 


 3.14 x10^6/uL


(3.50-5.40)


 


Hemoglobin


 7.4 g/dL


(12.0-15.5) 


 


 9.4 g/dL


(12.0-15.5)


 


Hematocrit


 22.6 %


(36.0-47.0) 


 


 28.0 %


(36.0-47.0)


 


Mean Corpuscular Volume 90 fL ()    89 fL () 


 


Mean Corpuscular Hemoglobin 30 pg (25-35)    30 pg (25-35) 


 


Mean Corpuscular Hemoglobin


Concent 33 g/dL


(31-37) 


 


 34 g/dL


(31-37)


 


Red Cell Distribution Width


 17.3 %


(11.5-14.5) 


 


 16.8 %


(11.5-14.5)


 


Platelet Count


 310 x10^3/uL


(140-400) 


 


 325 x10^3/uL


(140-400)


 


Glucose (Fingerstick)


 


 234 mg/dL


(70-99) 


 





 


O2 Saturation   97 % (92-99)  


 


Arterial Blood pH


 


 


 7.27


(7.35-7.45) 





 


Arterial Blood pCO2 at


Patient Temp 


 


 44 mmHg


(35-46) 





 


Arterial Blood pO2 at Patient


Temp 


 


 108 mmHg


() 





 


Arterial Blood HCO3


 


 


 20 mmol/L


(21-28) 





 


Arterial Blood Base Excess


 


 


 -7 mmol/L


(-3-3) 





 


FiO2   40  


 


Test


 6/8/20


00:45 6/8/20


06:59 


 





 


Glucose (Fingerstick)


 320 mg/dL


(70-99) 149 mg/dL


(70-99) 


 














Assessment and Plan


Assessmemt and Plan


Problems


Medical Problems:


(1) Acute pancreatitis


Status: Acute  





(2) Cholelithiasis


Status: Acute  











Comment


Review of Relevant


I have reviewed the following items josy (where applicable) has been applied.


Labs





Laboratory Tests








Test


 6/6/20


13:06 6/6/20


13:53 6/6/20


16:30 6/6/20


18:12


 


White Blood Count


 16.9 x10^3/uL


(4.0-11.0) 


 


 





 


Red Blood Count


 2.48 x10^6/uL


(3.50-5.40) 


 


 





 


Hemoglobin


 7.2 g/dL


(12.0-15.5) 


 


 





 


Hematocrit


 22.1 %


(36.0-47.0) 


 


 





 


Mean Corpuscular Volume 89 fL ()    


 


Mean Corpuscular Hemoglobin 29 pg (25-35)    


 


Mean Corpuscular Hemoglobin


Concent 33 g/dL


(31-37) 


 


 





 


Red Cell Distribution Width


 19.5 %


(11.5-14.5) 


 


 





 


Platelet Count


 448 x10^3/uL


(140-400) 


 


 





 


Glucose (Fingerstick)


 


 216 mg/dL


(70-99) 


 199 mg/dL


(70-99)


 


O2 Saturation   82 % (92-99)  


 


Arterial Blood pH


 


 


 7.36


(7.35-7.45) 





 


Arterial Blood pCO2 at


Patient Temp 


 


 39 mmHg


(35-46) 





 


Arterial Blood pO2 at Patient


Temp 


 


 53 mmHg


() 





 


Arterial Blood HCO3


 


 


 22 mmol/L


(21-28) 





 


Arterial Blood Base Excess


 


 


 -4 mmol/L


(-3-3) 





 


FiO2   21  


 


Test


 6/6/20


18:24 6/7/20


00:27 6/7/20


05:15 6/7/20


06:50


 


White Blood Count


 18.7 x10^3/uL


(4.0-11.0) 


 20.0 x10^3/uL


(4.0-11.0) 





 


Red Blood Count


 3.07 x10^6/uL


(3.50-5.40) 


 3.06 x10^6/uL


(3.50-5.40) 





 


Hemoglobin


 9.1 g/dL


(12.0-15.5) 


 8.7 g/dL


(12.0-15.5) 





 


Hematocrit


 27.2 %


(36.0-47.0) 


 27.3 %


(36.0-47.0) 





 


Mean Corpuscular Volume 89 fL ()   89 fL ()  


 


Mean Corpuscular Hemoglobin 30 pg (25-35)   29 pg (25-35)  


 


Mean Corpuscular Hemoglobin


Concent 33 g/dL


(31-37) 


 32 g/dL


(31-37) 





 


Red Cell Distribution Width


 18.0 %


(11.5-14.5) 


 18.5 %


(11.5-14.5) 





 


Platelet Count


 476 x10^3/uL


(140-400) 


 451 x10^3/uL


(140-400) 





 


Glucose (Fingerstick)


 


 247 mg/dL


(70-99) 


 





 


Neutrophils (%) (Auto)   88 % (31-73)  


 


Lymphocytes (%) (Auto)   8 % (24-48)  


 


Monocytes (%) (Auto)   3 % (0-9)  


 


Eosinophils (%) (Auto)   0 % (0-3)  


 


Basophils (%) (Auto)   0 % (0-3)  


 


Neutrophils # (Auto)


 


 


 17.6 x10^3/uL


(1.8-7.7) 





 


Lymphocytes # (Auto)


 


 


 1.7 x10^3/uL


(1.0-4.8) 





 


Monocytes # (Auto)


 


 


 0.6 x10^3/uL


(0.0-1.1) 





 


Eosinophils # (Auto)


 


 


 0.0 x10^3/uL


(0.0-0.7) 





 


Basophils # (Auto)


 


 


 0.0 x10^3/uL


(0.0-0.2) 





 


Sodium Level


 


 


 147 mmol/L


(136-145) 





 


Potassium Level


 


 


 5.3 mmol/L


(3.5-5.1) 





 


Chloride Level


 


 


 113 mmol/L


() 





 


Carbon Dioxide Level


 


 


 25 mmol/L


(21-32) 





 


Anion Gap   9 (6-14)  


 


Blood Urea Nitrogen


 


 


 74 mg/dL


(7-20) 





 


Creatinine


 


 


 1.9 mg/dL


(0.6-1.0) 





 


Estimated GFR


(Cockcroft-Gault) 


 


 28.1 


 





 


BUN/Creatinine Ratio   39 (6-20)  


 


Glucose Level


 


 


 241 mg/dL


(70-99) 





 


Calcium Level


 


 


 10.8 mg/dL


(8.5-10.1) 





 


Total Bilirubin


 


 


 0.6 mg/dL


(0.2-1.0) 





 


Aspartate Amino Transf


(AST/SGOT) 


 


 27 U/L (15-37) 


 





 


Alanine Aminotransferase


(ALT/SGPT) 


 


 20 U/L (14-59) 


 





 


Alkaline Phosphatase


 


 


 104 U/L


() 





 


Total Protein


 


 


 6.3 g/dL


(6.4-8.2) 





 


Albumin


 


 


 1.8 g/dL


(3.4-5.0) 





 


Albumin/Globulin Ratio   0.4 (1.0-1.7)  


 


Prothrombin Time


 


 


 


 16.0 SEC


(11.7-14.0)


 


Prothromb Time International


Ratio 


 


 


 1.3 (0.8-1.1) 





 


Activated Partial


Thromboplast Time 


 


 


 32 SEC (24-38) 





 


Lactic Acid Level


 


 


 


 1.7 mmol/L


(0.4-2.0)


 


Test


 6/7/20


08:08 6/7/20


11:30 6/7/20


13:20 6/7/20


18:05


 


Hemoglobin


 8.3 g/dL


(12.0-15.5) 


 9.6 g/dL


(12.0-15.5) 





 


Hematocrit


 26.0 %


(36.0-47.0) 


 28.9 %


(36.0-47.0) 





 


Mean Corpuscular Hemoglobin


Concent 32 g/dL


(31-37) 


 33 g/dL


(31-37) 





 


Urine Collection Type  Unknown   


 


Urine Color  Yellow   


 


Urine Clarity  Cloudy   


 


Urine pH  5.0 (<5.0-8.0)   


 


Urine Specific Gravity


 


 1.020


(1.000-1.030) 


 





 


Urine Protein


 


 30 mg/dL


(NEG-TRACE) 


 





 


Urine Glucose (UA)


 


 Negative mg/dL


(NEG) 


 





 


Urine Ketones (Stick)


 


 Negative mg/dL


(NEG) 


 





 


Urine Blood  Negative (NEG)   


 


Urine Nitrite  Negative (NEG)   


 


Urine Bilirubin  Negative (NEG)   


 


Urine Urobilinogen Dipstick


 


 0.2 mg/dL (0.2


mg/dL) 


 





 


Urine Leukocyte Esterase  Small (NEG)   


 


Urine RBC  0 /HPF (0-2)   


 


Urine WBC


 


 11-20 /HPF


(0-4) 


 





 


Urine Squamous Epithelial


Cells 


 Mod /LPF 


 


 





 


Urine Transitional Epithelial


Cells 


 Mod /LPF 


 


 





 


Urine Amorphous Sediment  Present /HPF   


 


Urine Bacteria


 


 Few /HPF


(0-FEW) 


 





 


Urine Hyaline Casts  Many /HPF   


 


Urine Granular Casts  Many /HPF   


 


Urine Mucus  Marked /LPF   


 


Urine Yeast  Present /HPF   


 


White Blood Count


 


 


 20.9 x10^3/uL


(4.0-11.0) 





 


Red Blood Count


 


 


 3.23 x10^6/uL


(3.50-5.40) 





 


Mean Corpuscular Volume   89 fL ()  


 


Mean Corpuscular Hemoglobin   30 pg (25-35)  


 


Red Cell Distribution Width


 


 


 17.0 %


(11.5-14.5) 





 


Platelet Count


 


 


 433 x10^3/uL


(140-400) 





 


O2 Saturation    95 % (92-99) 


 


Arterial Blood pH


 


 


 


 7.25


(7.35-7.45)


 


Arterial Blood pCO2 at


Patient Temp 


 


 


 48 mmHg


(35-46)


 


Arterial Blood pO2 at Patient


Temp 


 


 


 92 mmHg


()


 


Arterial Blood HCO3


 


 


 


 21 mmol/L


(21-28)


 


Arterial Blood Base Excess


 


 


 


 -6 mmol/L


(-3-3)


 


FiO2    50 


 


Test


 6/7/20


18:30 6/7/20


18:37 6/7/20


20:45 6/8/20


00:30


 


White Blood Count


 21.2 x10^3/uL


(4.0-11.0) 


 


 19.8 x10^3/uL


(4.0-11.0)


 


Red Blood Count


 2.51 x10^6/uL


(3.50-5.40) 


 


 3.14 x10^6/uL


(3.50-5.40)


 


Hemoglobin


 7.4 g/dL


(12.0-15.5) 


 


 9.4 g/dL


(12.0-15.5)


 


Hematocrit


 22.6 %


(36.0-47.0) 


 


 28.0 %


(36.0-47.0)


 


Mean Corpuscular Volume 90 fL ()    89 fL () 


 


Mean Corpuscular Hemoglobin 30 pg (25-35)    30 pg (25-35) 


 


Mean Corpuscular Hemoglobin


Concent 33 g/dL


(31-37) 


 


 34 g/dL


(31-37)


 


Red Cell Distribution Width


 17.3 %


(11.5-14.5) 


 


 16.8 %


(11.5-14.5)


 


Platelet Count


 310 x10^3/uL


(140-400) 


 


 325 x10^3/uL


(140-400)


 


Glucose (Fingerstick)


 


 234 mg/dL


(70-99) 


 





 


O2 Saturation   97 % (92-99)  


 


Arterial Blood pH


 


 


 7.27


(7.35-7.45) 





 


Arterial Blood pCO2 at


Patient Temp 


 


 44 mmHg


(35-46) 





 


Arterial Blood pO2 at Patient


Temp 


 


 108 mmHg


() 





 


Arterial Blood HCO3


 


 


 20 mmol/L


(21-28) 





 


Arterial Blood Base Excess


 


 


 -7 mmol/L


(-3-3) 





 


FiO2   40  


 


Test


 6/8/20


00:45 6/8/20


06:59 


 





 


Glucose (Fingerstick)


 320 mg/dL


(70-99) 149 mg/dL


(70-99) 


 











Laboratory Tests








Test


 6/7/20


08:08 6/7/20


11:30 6/7/20


13:20 6/7/20


18:05


 


Hemoglobin


 8.3 g/dL


(12.0-15.5) 


 9.6 g/dL


(12.0-15.5) 





 


Hematocrit


 26.0 %


(36.0-47.0) 


 28.9 %


(36.0-47.0) 





 


Mean Corpuscular Hemoglobin


Concent 32 g/dL


(31-37) 


 33 g/dL


(31-37) 





 


Urine Collection Type  Unknown   


 


Urine Color  Yellow   


 


Urine Clarity  Cloudy   


 


Urine pH  5.0 (<5.0-8.0)   


 


Urine Specific Gravity


 


 1.020


(1.000-1.030) 


 





 


Urine Protein


 


 30 mg/dL


(NEG-TRACE) 


 





 


Urine Glucose (UA)


 


 Negative mg/dL


(NEG) 


 





 


Urine Ketones (Stick)


 


 Negative mg/dL


(NEG) 


 





 


Urine Blood  Negative (NEG)   


 


Urine Nitrite  Negative (NEG)   


 


Urine Bilirubin  Negative (NEG)   


 


Urine Urobilinogen Dipstick


 


 0.2 mg/dL (0.2


mg/dL) 


 





 


Urine Leukocyte Esterase  Small (NEG)   


 


Urine RBC  0 /HPF (0-2)   


 


Urine WBC


 


 11-20 /HPF


(0-4) 


 





 


Urine Squamous Epithelial


Cells 


 Mod /LPF 


 


 





 


Urine Transitional Epithelial


Cells 


 Mod /LPF 


 


 





 


Urine Amorphous Sediment  Present /HPF   


 


Urine Bacteria


 


 Few /HPF


(0-FEW) 


 





 


Urine Hyaline Casts  Many /HPF   


 


Urine Granular Casts  Many /HPF   


 


Urine Mucus  Marked /LPF   


 


Urine Yeast  Present /HPF   


 


White Blood Count


 


 


 20.9 x10^3/uL


(4.0-11.0) 





 


Red Blood Count


 


 


 3.23 x10^6/uL


(3.50-5.40) 





 


Mean Corpuscular Volume   89 fL ()  


 


Mean Corpuscular Hemoglobin   30 pg (25-35)  


 


Red Cell Distribution Width


 


 


 17.0 %


(11.5-14.5) 





 


Platelet Count


 


 


 433 x10^3/uL


(140-400) 





 


O2 Saturation    95 % (92-99) 


 


Arterial Blood pH


 


 


 


 7.25


(7.35-7.45)


 


Arterial Blood pCO2 at


Patient Temp 


 


 


 48 mmHg


(35-46)


 


Arterial Blood pO2 at Patient


Temp 


 


 


 92 mmHg


()


 


Arterial Blood HCO3


 


 


 


 21 mmol/L


(21-28)


 


Arterial Blood Base Excess


 


 


 


 -6 mmol/L


(-3-3)


 


FiO2    50 


 


Test


 6/7/20


18:30 6/7/20


18:37 6/7/20


20:45 6/8/20


00:30


 


White Blood Count


 21.2 x10^3/uL


(4.0-11.0) 


 


 19.8 x10^3/uL


(4.0-11.0)


 


Red Blood Count


 2.51 x10^6/uL


(3.50-5.40) 


 


 3.14 x10^6/uL


(3.50-5.40)


 


Hemoglobin


 7.4 g/dL


(12.0-15.5) 


 


 9.4 g/dL


(12.0-15.5)


 


Hematocrit


 22.6 %


(36.0-47.0) 


 


 28.0 %


(36.0-47.0)


 


Mean Corpuscular Volume 90 fL ()    89 fL () 


 


Mean Corpuscular Hemoglobin 30 pg (25-35)    30 pg (25-35) 


 


Mean Corpuscular Hemoglobin


Concent 33 g/dL


(31-37) 


 


 34 g/dL


(31-37)


 


Red Cell Distribution Width


 17.3 %


(11.5-14.5) 


 


 16.8 %


(11.5-14.5)


 


Platelet Count


 310 x10^3/uL


(140-400) 


 


 325 x10^3/uL


(140-400)


 


Glucose (Fingerstick)


 


 234 mg/dL


(70-99) 


 





 


O2 Saturation   97 % (92-99)  


 


Arterial Blood pH


 


 


 7.27


(7.35-7.45) 





 


Arterial Blood pCO2 at


Patient Temp 


 


 44 mmHg


(35-46) 





 


Arterial Blood pO2 at Patient


Temp 


 


 108 mmHg


() 





 


Arterial Blood HCO3


 


 


 20 mmol/L


(21-28) 





 


Arterial Blood Base Excess


 


 


 -7 mmol/L


(-3-3) 





 


FiO2   40  


 


Test


 6/8/20


00:45 6/8/20


06:59 


 





 


Glucose (Fingerstick)


 320 mg/dL


(70-99) 149 mg/dL


(70-99) 


 











Microbiology


5/30/20 Gram Stain - Final, Complete


          


5/30/20 Aerobic Culture - Final, Complete


          


5/17/20 Blood Culture - Final, Complete


          NO GROWTH AFTER 5 DAYS


5/6/20 Fungal Culture - Final, Complete


         


5/6/20 Fungal Culture Result 1 - Final, Complete


         


4/12/20 Urine Culture - Final, Complete


          


4/12/20 Urine Culture Result 1 ([facility]) - Final, Complete


Medications





Current Medications


Sodium Chloride 1,000 ml @  1,000 mls/hr Q1H IV  Last administered on 3/16/20at 

03:00;  Start 3/16/20 at 03:00;  Stop 3/16/20 at 03:59;  Status DC


Ondansetron HCl (Zofran) 4 mg 1X  ONCE IVP  Last administered on 3/16/20at 

03:27;  Start 3/16/20 at 03:00;  Stop 3/16/20 at 03:01;  Status DC


Morphine Sulfate (Morphine Sulfate) 4 mg 1X  ONCE IV ;  Start 3/16/20 at 03:00; 

Stop 3/16/20 at 03:01;  Status Cancel


Ketorolac Tromethamine (Toradol 30mg Vial) 30 mg 1X  ONCE IV  Last administered 

on 3/16/20at 02:54;  Start 3/16/20 at 03:00;  Stop 3/16/20 at 03:01;  Status DC


Fentanyl Citrate (Fentanyl 2ml Vial) 25 mcg 1X  ONCE IVP  Last administered on 

3/16/20at 03:23;  Start 3/16/20 at 03:30;  Stop 3/16/20 at 03:31;  Status DC


Fentanyl Citrate (Fentanyl 2ml Vial) 100 mcg STK-MED ONCE .ROUTE ;  Start 

3/16/20 at 03:18;  Stop 3/16/20 at 03:18;  Status DC


Iohexol (Omnipaque 350 Mg/ml) 90 ml 1X  ONCE IV  Last administered on 3/16/20at 

03:25;  Start 3/16/20 at 03:30;  Stop 3/16/20 at 03:31;  Status DC


Info (CONTRAST GIVEN -- Rx MONITORING) 1 each PRN DAILY  PRN MC SEE COMMENTS;  

Start 3/16/20 at 03:30;  Stop 3/18/20 at 03:29;  Status DC


Hydromorphone HCl (Dilaudid) 0.5 mg 1X  ONCE IV  Last administered on 3/16/20at 

03:55;  Start 3/16/20 at 04:30;  Stop 3/16/20 at 04:32;  Status DC


Ondansetron HCl (Zofran) 4 mg PRN Q8HRS  PRN IV NAUSEA/VOMITING 1ST CHOICE;  

Start 3/16/20 at 05:00;  Stop 3/16/20 at 09:27;  Status DC


Morphine Sulfate (Morphine Sulfate) 2 mg PRN Q2HR  PRN IV SEVERE PAIN 7-10 Last 

administered on 3/17/20at 12:26;  Start 3/16/20 at 05:00;  Stop 3/17/20 at 

14:15;  Status DC


Sodium Chloride 1,000 ml @  125 mls/hr Q8H IV  Last administered on 3/16/20at 

20:56;  Start 3/16/20 at 05:00;  Stop 3/17/20 at 04:59;  Status DC


Hydromorphone HCl (Dilaudid) 0.5 mg PRN Q3HRS  PRN IV SEVERE PAIN 7-10 Last 

administered on 3/17/20at 10:06;  Start 3/16/20 at 05:00;  Stop 3/17/20 at 

12:01;  Status DC


Piperacillin Sod/ Tazobactam Sod 4.5 gm/Sodium Chloride 100 ml @  200 mls/hr 1X 

ONCE IV  Last administered on 3/16/20at 05:44;  Start 3/16/20 at 06:00;  Stop 

3/16/20 at 06:29;  Status DC


Ondansetron HCl (Zofran) 4 mg PRN Q4HRS  PRN IV NAUSEA/VOMITING 1ST CHOICE Last 

administered on 6/7/20at 05:18;  Start 3/16/20 at 09:30


Insulin Human Lispro (HumaLOG) 0-9 UNITS Q6HRS SQ  Last administered on 6/8/20at

00:50;  Start 3/16/20 at 09:30


Dextrose (Dextrose 50%-Water Syringe) 12.5 gm PRN Q15MIN  PRN IV SEE COMMENTS;  

Start 3/16/20 at 09:30


Pantoprazole Sodium (PROTONIX VIAL for IV PUSH) 40 mg DAILYAC IVP  Last adminis

tered on 6/7/20at 11:01;  Start 3/16/20 at 11:30


Prochlorperazine Edisylate (Compazine) 10 mg PRN Q6HRS  PRN IV NAUSEA/VOMITING, 

2nd CHOICE Last administered on 6/7/20at 11:00;  Start 3/16/20 at 17:45


Atenolol (Tenormin) 100 mg DAILY PO ;  Start 3/17/20 at 09:00;  Stop 3/16/20 at 

20:08;  Status DC


Metoprolol Tartrate (Lopressor Vial) 2.5 mg Q6HRS IVP  Last administered on 

3/17/20at 05:51;  Start 3/16/20 at 20:15;  Stop 3/17/20 at 10:02;  Status DC


Metoprolol Tartrate (Lopressor Vial) 5 mg Q6HRS IVP  Last administered on 

3/26/20at 00:12;  Start 3/17/20 at 10:15;  Stop 3/28/20 at 08:48;  Status DC


Hydromorphone HCl (Dilaudid) 1 mg PRN Q3HRS  PRN IV SEVERE PAIN 7-10 Last 

administered on 3/23/20at 05:13;  Start 3/17/20 at 12:00;  Stop 3/31/20 at 

00:25;  Status DC


Lidocaine HCl (Buffered Lidocaine 1%) 3 ml STK-MED ONCE .ROUTE ;  Start 3/17/20 

at 12:55;  Stop 3/17/20 at 12:56;  Status DC


Albumin Human 500 ml @  125 mls/hr 1X  ONCE IV  Last administered on 3/17/20at 

14:33;  Start 3/17/20 at 14:30;  Stop 3/17/20 at 18:32;  Status DC


Norepinephrine Bitartrate 8 mg/ Dextrose 258 ml @  17.299 mls/ hr CONT  PRN IV 

PER PROTOCOL Last administered on 4/14/20at 12:48;  Start 3/17/20 at 15:30;  

Stop 4/17/20 at 09:19;  Status DC


Sodium Chloride 1,000 ml @  125 mls/hr Q8H IV  Last administered on 3/17/20at 

21:04;  Start 3/17/20 at 16:00;  Stop 3/18/20 at 02:42;  Status DC


Albumin Human 500 ml @  125 mls/hr PRN BID  PRN IV After every 2L NSS & BP < 

90mm Last administered on 6/6/20at 11:40;  Start 3/17/20 at 16:00


Iohexol (Omnipaque 300 Mg/ml) 60 ml 1X  ONCE IV  Last administered on 3/17/20at 

17:20;  Start 3/17/20 at 17:00;  Stop 3/17/20 at 17:01;  Status DC


Info (CONTRAST GIVEN -- Rx MONITORING) 1 each PRN DAILY  PRN MC SEE COMMENTS;  

Start 3/17/20 at 17:00;  Stop 3/19/20 at 16:59;  Status DC


Meropenem 1 gm/ Sodium Chloride 100 ml @  200 mls/hr Q8HRS IV  Last administered

on 3/18/20at 05:45;  Start 3/17/20 at 20:00;  Stop 3/18/20 at 08:48;  Status DC


Furosemide (Lasix) 40 mg 1X  ONCE IVP  Last administered on 3/17/20at 22:12;  

Start 3/17/20 at 22:30;  Stop 3/17/20 at 22:31;  Status DC


Calcium Chloride 1000 mg/Sodium Chloride 110 ml @  220 mls/hr 1X  ONCE IV  Last 

administered on 3/17/20at 22:11;  Start 3/17/20 at 22:30;  Stop 3/17/20 at 

22:59;  Status DC


Albuterol Sulfate (Ventolin Neb Soln) 2.5 mg 1X  ONCE NEB  Last administered on 

3/18/20at 00:56;  Start 3/17/20 at 22:30;  Stop 3/17/20 at 22:31;  Status DC


Insulin Human Regular (HumuLIN R VIAL) 5 unit 1X  ONCE IV  Last administered on 

3/17/20at 22:14;  Start 3/17/20 at 22:30;  Stop 3/17/20 at 22:31;  Status DC


Magnesium Sulfate 50 ml @ 25 mls/hr 1X  ONCE IV  Last administered on 3/18/20at 

02:57;  Start 3/18/20 at 03:00;  Stop 3/18/20 at 04:59;  Status DC


Calcium Gluconate 1000 mg/Sodium Chloride 110 ml @  220 mls/hr 1X  ONCE IV  Last

administered on 3/18/20at 02:46;  Start 3/18/20 at 03:00;  Stop 3/18/20 at 

03:29;  Status DC


Sodium Chloride 1,000 ml @  200 mls/hr Q5H IV  Last administered on 3/18/20at 

02:46;  Start 3/18/20 at 03:00;  Stop 3/18/20 at 10:21;  Status DC


Calcium Gluconate 1000 mg/Sodium Chloride 110 ml @  220 mls/hr 1X  ONCE IV  Last

administered on 3/18/20at 03:21;  Start 3/18/20 at 03:30;  Stop 3/18/20 at 

03:59;  Status DC


Sodium Bicarbonate 50 meq/Sodium Chloride 1,050 ml @  75 mls/hr Q14H IV  Last 

administered on 3/22/20at 21:10;  Start 3/18/20 at 07:30;  Stop 3/23/20 at 

10:28;  Status DC


Calcium Gluconate 2000 mg/Sodium Chloride 120 ml @  220 mls/hr 1X  ONCE IV  Last

administered on 3/18/20at 09:05;  Start 3/18/20 at 07:30;  Stop 3/18/20 at 

08:02;  Status DC


Lidocaine HCl (Xylocaine-Mpf 1% 2ml Vial) 2 ml STK-MED ONCE .ROUTE ;  Start 

3/18/20 at 08:47;  Stop 3/18/20 at 08:47;  Status DC


Meropenem 500 mg/ Sodium Chloride 50 ml @  100 mls/hr Q12HR IV  Last 

administered on 3/23/20at 21:01;  Start 3/18/20 at 18:00;  Stop 3/24/20 at 

07:58;  Status DC


Lidocaine HCl (Buffered Lidocaine 1%) 3 ml STK-MED ONCE .ROUTE ;  Start 3/18/20 

at 09:46;  Stop 3/18/20 at 09:46;  Status DC


Lidocaine HCl (Buffered Lidocaine 1%) 6 ml 1X  ONCE INJ  Last administered on 

3/18/20at 10:26;  Start 3/18/20 at 10:15;  Stop 3/18/20 at 10:16;  Status DC


Info (Tpn Per Pharmacy) 1 each PRN DAILY  PRN MC SEE COMMENTS Last administered 

on 6/7/20at 08:44;  Start 3/18/20 at 12:00


Sodium Chloride 1,000 ml @  1,000 mls/hr Q1H PRN IV hypotension;  Start 3/18/20 

at 12:07;  Stop 3/18/20 at 18:06;  Status DC


Diphenhydramine HCl (Benadryl) 25 mg 1X PRN  PRN IV ITCHING;  Start 3/18/20 at 

12:15;  Stop 3/19/20 at 12:14;  Status DC


Diphenhydramine HCl (Benadryl) 25 mg 1X PRN  PRN IV ITCHING;  Start 3/18/20 at 

12:15;  Stop 3/19/20 at 12:14;  Status DC


Sodium Chloride 1,000 ml @  400 mls/hr Q2H30M PRN IV PATENCY;  Start 3/18/20 at 

12:07;  Stop 3/19/20 at 00:06;  Status DC


Info (PHARMACY MONITORING -- do not chart) 1 each PRN DAILY  PRN MC SEE 

COMMENTS;  Start 3/18/20 at 12:15;  Stop 3/20/20 at 08:13;  Status DC


Sodium Chloride 90 meq/Calcium Gluconate 10 meq/ Multivitamins 10 ml/Chromium/ 

Copper/Manganese/ Seleni/Zn 1 ml/ Total Parenteral Nutrition/Amino 

Acids/Dextrose/ Fat Emulsion Intravenous 55.005 ml  @ 2.292 mls/hr TPN  CONT IV 

;  Start 3/18/20 at 22:00;  Stop 3/18/20 at 12:33;  Status DC


Info (Tpn Per Pharmacy) 1 each PRN DAILY  PRN MC SEE COMMENTS;  Start 3/18/20 at

12:30;  Status UNV


Sodium Chloride 90 meq/Calcium Gluconate 10 meq/ Multivitamins 10 ml/Chromium/ 

Copper/Manganese/ Seleni/Zn 0.5 ml/ Total Parenteral Nutrition/Amino 

Acids/Dextrose/ Fat Emulsion Intravenous 1,512 ml @  63 mls/hr TPN  CONT IV  

Last administered on 3/18/20at 22:06;  Start 3/18/20 at 22:00;  Stop 3/19/20 at 

21:59;  Status DC


Calcium Carbonate/ Glycine (Tums) 500 mg PRN AFTMEALHC  PRN PO INDIGESTION;  

Start 3/18/20 at 17:45;  Stop 5/13/20 at 10:25;  Status DC


Calcium Gluconate (Calcium Gluconate) 2,000 mg 1X  ONCE IVP  Last administered 

on 3/19/20at 02:19;  Start 3/19/20 at 02:15;  Stop 3/19/20 at 02:16;  Status DC


Calcium Chloride 3000 mg/Sodium Chloride 1,030 ml @  50 mls/hr [id number] IV  Last 

administered on 3/21/20at 02:17;  Start 3/19/20 at 08:00;  Stop 3/21/20 at 

15:23;  Status DC


Lorazepam (Ativan Inj) 1 mg PRN Q4HRS  PRN IVP ANXIETY / AGITATION, 2nd choic 

Last administered on 4/17/20at 03:51;  Start 3/19/20 at 09:00;  Stop 4/17/20 at 

09:19;  Status DC


Sodium Chloride 1,000 ml @  1,000 mls/hr Q1H PRN IV hypotension;  Start 3/19/20 

at 08:56;  Stop 3/19/20 at 14:55;  Status DC


Albumin Human 200 ml @  200 mls/hr 1X PRN  PRN IV Hypotension;  Start 3/19/20 at

09:00;  Stop 3/19/20 at 14:59;  Status DC


Diphenhydramine HCl (Benadryl) 25 mg 1X PRN  PRN IV ITCHING;  Start 3/19/20 at 

09:00;  Stop 3/20/20 at 08:59;  Status DC


Diphenhydramine HCl (Benadryl) 25 mg 1X PRN  PRN IV ITCHING;  Start 3/19/20 at 

09:00;  Stop 3/20/20 at 08:59;  Status DC


Sodium Chloride 1,000 ml @  400 mls/hr Q2H30M PRN IV PATENCY;  Start 3/19/20 at 

08:56;  Stop 3/19/20 at 20:55;  Status DC


Info (PHARMACY MONITORING -- do not chart) 1 each PRN DAILY  PRN MC SEE 

COMMENTS;  Start 3/19/20 at 09:00;  Status UNV


Info (PHARMACY MONITORING -- do not chart) 1 each PRN DAILY  PRN MC SEE 

COMMENTS;  Start 3/19/20 at 09:00;  Stop 3/20/20 at 08:13;  Status DC


Digoxin (Lanoxin) 500 mcg 1X  ONCE IV  Last administered on 3/19/20at 10:04;  

Start 3/19/20 at 10:00;  Stop 3/19/20 at 10:01;  Status DC


Digoxin (Lanoxin) 125 mcg 1X  ONCE IV  Last administered on 3/19/20at 17:10;  

Start 3/19/20 at 18:00;  Stop 3/19/20 at 18:01;  Status DC


Magnesium Sulfate 100 ml @  25 mls/hr 1X  ONCE IV  Last administered on 

3/19/20at 12:48;  Start 3/19/20 at 13:00;  Stop 3/19/20 at 16:59;  Status DC


Sodium Chloride 90 meq/Magnesium Sulfate 10 meq/ Calcium Gluconate 20 meq/ 

Multivitamins 10 ml/Chromium/ Copper/Manganese/ Seleni/Zn 0.5 ml/ Total 

Parenteral Nutrition/Amino Acids/Dextrose/ Fat Emulsion Intravenous 1,512 ml @  

63 mls/hr TPN  CONT IV  Last administered on 3/19/20at 22:25;  Start 3/19/20 at 

22:00;  Stop 3/20/20 at 21:59;  Status DC


Sodium Chloride 1,000 ml @  1,000 mls/hr Q1H PRN IV hypotension;  Start 3/20/20 

at 08:05;  Stop 3/20/20 at 14:04;  Status DC


Albumin Human 200 ml @  200 mls/hr 1X  ONCE IV  Last administered on 3/20/20at 

08:57;  Start 3/20/20 at 08:15;  Stop 3/20/20 at 09:14;  Status DC


Diphenhydramine HCl (Benadryl) 25 mg 1X PRN  PRN IV ITCHING;  Start 3/20/20 at 

08:15;  Stop 3/21/20 at 08:14;  Status DC


Diphenhydramine HCl (Benadryl) 25 mg 1X PRN  PRN IV ITCHING;  Start 3/20/20 at 

08:15;  Stop 3/21/20 at 08:14;  Status DC


Sodium Chloride 1,000 ml @  400 mls/hr Q2H30M PRN IV PATENCY;  Start 3/20/20 at 

08:05;  Stop 3/20/20 at 20:04;  Status DC


Info (PHARMACY MONITORING -- do not chart) 1 each PRN DAILY  PRN MC SEE 

COMMENTS;  Start 3/20/20 at 08:15;  Stop 3/24/20 at 07:57;  Status DC


Sodium Chloride 90 meq/Potassium Chloride 15 meq/ Potassium Phosphate 10 mmol/ 

Magnesium Sulfate 10 meq/Calcium Gluconate 20 meq/ Multivitamins 10 ml/Chromium/

Copper/Manganese/ Seleni/Zn 0.5 ml/ Total Parenteral Nutrition/Amino 

Acids/Dextrose/ Fat Emulsion Intravenous 1,512 ml @  63 mls/hr TPN  CONT IV  

Last administered on 3/20/20at 21:01;  Start 3/20/20 at 22:00;  Stop 3/21/20 at 

21:59;  Status DC


Potassium Chloride/Water 100 ml @  100 mls/hr 1X  ONCE IV  Last administered on 

3/20/20at 14:09;  Start 3/20/20 at 14:00;  Stop 3/20/20 at 14:59;  Status DC


Benzocaine (Hurricaine One) 1 spray 1X  ONCE MM  Last administered on 3/20/20at 

16:38;  Start 3/20/20 at 14:30;  Stop 3/20/20 at 14:31;  Status DC


Lidocaine HCl (Glydo (Lidocaine) Jelly) 1 thomas 1X  ONCE MM  Last administered on 

3/20/20at 16:38;  Start 3/20/20 at 14:30;  Stop 3/20/20 at 14:31;  Status DC


Linezolid/Dextrose 300 ml @  300 mls/hr Q12HR IV  Last administered on 3/26/20at

21:04;  Start 3/20/20 at 20:00;  Stop 3/27/20 at 07:50;  Status DC


Acetaminophen (Tylenol) 650 mg PRN Q6HRS  PRN PO MILD PAIN / TEMP;  Start 

3/21/20 at 03:30;  Stop 3/21/20 at 03:36;  Status DC


Acetaminophen (Tylenol) 650 mg PRN Q6HRS  PRN PEG MILD PAIN / TEMP Last 

administered on 4/16/20at 19:56;  Start 3/21/20 at 03:36;  Stop 5/13/20 at 

10:25;  Status DC


Sodium Chloride 1,000 ml @  1,000 mls/hr Q1H PRN IV hypotension;  Start 3/21/20 

at 07:50;  Stop 3/21/20 at 13:49;  Status DC


Albumin Human 200 ml @  200 mls/hr 1X PRN  PRN IV Hypotension;  Start 3/21/20 at

08:00;  Stop 3/21/20 at 13:59;  Status DC


Sodium Chloride (Normal Saline Flush) 10 ml 1X PRN  PRN IV AP catheter pack;  

Start 3/21/20 at 08:00;  Stop 3/22/20 at 07:59;  Status DC


Sodium Chloride (Normal Saline Flush) 10 ml 1X PRN  PRN IV  catheter pack;  

Start 3/21/20 at 08:00;  Stop 3/22/20 at 07:59;  Status DC


Sodium Chloride 1,000 ml @  400 mls/hr Q2H30M PRN IV PATENCY;  Start 3/21/20 at 

07:50;  Stop 3/21/20 at 19:49;  Status DC


Info (PHARMACY MONITORING -- do not chart) 1 each PRN DAILY  PRN MC SEE 

COMMENTS;  Start 3/21/20 at 08:00;  Status UNV


Info (PHARMACY MONITORING -- do not chart) 1 each PRN DAILY  PRN MC SEE 

COMMENTS;  Start 3/21/20 at 08:00;  Stop 3/23/20 at 08:25;  Status DC


Sodium Chloride 90 meq/Potassium Chloride 15 meq/ Potassium Phosphate 10 mmol/ 

Magnesium Sulfate 10 meq/Calcium Gluconate 20 meq/ Multivitamins 10 ml/Chromium/

Copper/Manganese/ Seleni/Zn 0.5 ml/ Total Parenteral Nutrition/Amino 

Acids/Dextrose/ Fat Emulsion Intravenous 1,512 ml @  63 mls/hr TPN  CONT IV  

Last administered on 3/21/20at 20:57;  Start 3/21/20 at 22:00;  Stop 3/22/20 at 

21:59;  Status DC


Sodium Chloride 90 meq/Potassium Chloride 15 meq/ Potassium Phosphate 15 mmol/ 

Magnesium Sulfate 10 meq/Calcium Gluconate 20 meq/ Multivitamins 10 ml/Chromium/

Copper/Manganese/ Seleni/Zn 0.5 ml/ Total Parenteral Nutrition/Amino A

cids/Dextrose/ Fat Emulsion Intravenous 1,512 ml @  63 mls/hr TPN  CONT IV ;  

Start 3/22/20 at 22:00;  Stop 3/22/20 at 14:16;  Status DC


Sodium Chloride 90 meq/Potassium Chloride 15 meq/ Potassium Phosphate 15 mmol/ 

Magnesium Sulfate 10 meq/Calcium Gluconate 20 meq/ Multivitamins 10 ml/Chromium/

Copper/Manganese/ Seleni/Zn 0.5 ml/ Total Parenteral Nutrition/Amino 

Acids/Dextrose/ Fat Emulsion Intravenous 1,200 ml @  50 mls/hr TPN  CONT IV ;  

Start 3/22/20 at 22:00;  Stop 3/22/20 at 14:17;  Status DC


Sodium Chloride 90 meq/Potassium Chloride 15 meq/ Potassium Phosphate 10 mmol/ 

Magnesium Sulfate 10 meq/Calcium Gluconate 20 meq/ Multivitamins 10 ml/Chromium/

Copper/Manganese/ Seleni/Zn 0.5 ml/ Total Parenteral Nutrition/Amino 

Acids/Dextrose/ Fat Emulsion Intravenous 1,200 ml @  50 mls/hr TPN  CONT IV  

Last administered on 3/22/20at 23:29;  Start 3/22/20 at 22:00;  Stop 3/23/20 at 

21:59;  Status DC


Sodium Chloride 1,000 ml @  1,000 mls/hr Q1H PRN IV hypotension;  Start 3/23/20 

at 07:28;  Stop 3/23/20 at 13:27;  Status DC


Albumin Human 200 ml @  200 mls/hr 1X  ONCE IV  Last administered on 3/23/20at 

08:51;  Start 3/23/20 at 07:30;  Stop 3/23/20 at 08:29;  Status DC


Diphenhydramine HCl (Benadryl) 25 mg 1X PRN  PRN IV ITCHING;  Start 3/23/20 at 

07:30;  Stop 3/24/20 at 07:29;  Status DC


Diphenhydramine HCl (Benadryl) 25 mg 1X PRN  PRN IV ITCHING;  Start 3/23/20 at 

07:30;  Stop 3/24/20 at 07:29;  Status DC


Sodium Chloride 1,000 ml @  400 mls/hr Q2H30M PRN IV PATENCY;  Start 3/23/20 at 

07:28;  Stop 3/23/20 at 19:27;  Status DC


Info (PHARMACY MONITORING -- do not chart) 1 each PRN DAILY  PRN MC SEE 

COMMENTS;  Start 3/23/20 at 07:30;  Stop 4/3/20 at 13:01;  Status DC


Metronidazole 100 ml @  100 mls/hr Q6HRS IV  Last administered on 4/8/20at 

06:26;  Start 3/23/20 at 08:30;  Stop 4/8/20 at 09:58;  Status DC


Micafungin Sodium 100 mg/Dextrose 100 ml @  100 mls/hr Q24H IV  Last 

administered on 4/30/20at 08:18;  Start 3/23/20 at 09:00;  Stop 4/30/20 at 

20:58;  Status DC


Propofol 0 ml @ As Directed STK-MED ONCE IV ;  Start 3/23/20 at 07:53;  Stop 

3/23/20 at 07:53;  Status DC


Etomidate (Amidate) 20 mg STK-MED ONCE IV ;  Start 3/23/20 at 07:53;  Stop 

3/23/20 at 07:54;  Status DC


Midazolam HCl (Versed) 5 mg STK-MED ONCE .ROUTE ;  Start 3/23/20 at 07:57;  Stop

3/23/20 at 07:57;  Status DC


Fentanyl Citrate 30 ml @ 0 mls/hr CONT  PRN IV SEE PROTOCOL Last administered on

4/17/20at 06:12;  Start 3/23/20 at 08:15;  Stop 4/17/20 at 09:19;  Status DC


Artificial Tears (Artificial Tears) 1 drop PRN Q1HR  PRN OU DRY EYE, 1st choice;

 Start 3/23/20 at 08:15;  Stop 4/29/20 at 05:31;  Status DC


Midazolam HCl 50 mg/Sodium Chloride 50 ml @ 0 mls/hr CONT  PRN IV SEE PROTOCOL 

Last administered on 3/26/20at 22:39;  Start 3/23/20 at 08:15;  Stop 3/28/20 at 

15:59;  Status DC


Etomidate (Amidate) 8 mg 1X  ONCE IV  Last administered on 3/23/20at 08:33;  

Start 3/23/20 at 08:30;  Stop 3/23/20 at 08:31;  Status DC


Succinylcholine Chloride (Anectine) 120 mg 1X  ONCE IV  Last administered on 

3/23/20at 08:34;  Start 3/23/20 at 08:30;  Stop 3/23/20 at 08:31;  Status DC


Midazolam HCl (Versed) 5 mg 1X  ONCE IV ;  Start 3/23/20 at 08:30;  Stop 3/23/20

at 08:31;  Status DC


Potassium Chloride 15 meq/ Bicarbonate Dialysis Soln w/ out KCl 5,007.5 ml  @ 

1,000 mls/ hr Q5H1M IV  Last administered on 3/24/20at 11:11;  Start 3/23/20 at 

12:00;  Stop 3/24/20 at 11:15;  Status DC


Potassium Chloride 15 meq/ Bicarbonate Dialysis Soln w/ out KCl 5,007.5 ml  @ 

1,000 mls/ hr Q5H1M IV  Last administered on 3/24/20at 11:12;  Start 3/23/20 at 

12:00;  Stop 3/24/20 at 11:17;  Status DC


Potassium Chloride 15 meq/ Bicarbonate Dialysis Soln w/ out KCl 5,007.5 ml  @ 

1,000 mls/ hr Q5H1M IV  Last administered on 3/24/20at 11:11;  Start 3/23/20 at 

12:00;  Stop 3/24/20 at 11:19;  Status DC


Sodium Chloride 90 meq/Potassium Chloride 15 meq/ Potassium Phosphate 10 mmol/ 

Magnesium Sulfate 10 meq/Calcium Gluconate 20 meq/ Multivitamins 10 ml/Chromium/

Copper/Manganese/ Seleni/Zn 0.5 ml/ Total Parenteral Nutrition/Amino 

Acids/Dextrose/ Fat Emulsion Intravenous 1,400 ml @  58.333 mls/ hr TPN  CONT IV

 Last administered on 3/23/20at 21:42;  Start 3/23/20 at 22:00;  Stop 3/24/20 at

21:59;  Status DC


Heparin Sodium (Porcine) (Heparin Sodium) 5,000 unit Q8HRS SQ  Last administered

on 3/28/20at 05:55;  Start 3/23/20 at 15:00;  Stop 3/28/20 at 13:28;  Status DC


Meropenem 500 mg/ Sodium Chloride 50 ml @  100 mls/hr Q6HRS IV  Last 

administered on 3/25/20at 06:00;  Start 3/24/20 at 09:00;  Stop 3/25/20 at 

07:29;  Status DC


Potassium Phosphate 20 mmol/ Sodium Chloride 106.6667 ml @  51.667 m... 1X  ONCE

IV  Last administered on 3/24/20at 11:22;  Start 3/24/20 at 10:15;  Stop 3/24/20

at 12:18;  Status DC


Acetaminophen (Tylenol Supp) 650 mg PRN Q6HRS  PRN PA MILD PAIN / TEMP > 100.3'F

Last administered on 6/7/20at 14:41;  Start 3/24/20 at 10:30


Potassium Chloride/Water 100 ml @  100 mls/hr Q1H IV  Last administered on 

3/24/20at 12:12;  Start 3/24/20 at 11:00;  Stop 3/24/20 at 12:59;  Status DC


Potassium Chloride 20 meq/ Bicarbonate Dialysis Soln w/ out KCl 5,010 ml @  

1,000 mls/hr Q5H1M IV  Last administered on 3/25/20at 08:48;  Start 3/24/20 at 

12:00;  Stop 3/25/20 at 13:03;  Status DC


Potassium Chloride 20 meq/ Bicarbonate Dialysis Soln w/ out KCl 5,010 ml @  

1,000 mls/hr Q5H1M IV  Last administered on 3/29/20at 14:52;  Start 3/24/20 at 

11:30;  Stop 3/29/20 at 19:59;  Status DC


Potassium Chloride 20 meq/ Bicarbonate Dialysis Soln w/ out KCl 5,010 ml @  

1,000 mls/hr Q5H1M IV  Last administered on 3/29/20at 14:53;  Start 3/24/20 at 

11:30;  Stop 3/29/20 at 19:59;  Status DC


Sodium Chloride 90 meq/Potassium Chloride 15 meq/ Potassium Phosphate 15 mmol/ 

Magnesium Sulfate 10 meq/Calcium Gluconate 15 meq/ Multivitamins 10 ml/Chromium/

Copper/Manganese/ Seleni/Zn 0.5 ml/ Total Parenteral Nutrition/Amino 

Acids/Dextrose/ Fat Emulsion Intravenous 1,400 ml @  58.333 mls/ hr TPN  CONT IV

 Last administered on 3/24/20at 22:17;  Start 3/24/20 at 22:00;  Stop 3/25/20 at

21:59;  Status DC


Cefepime HCl (Maxipime) 2 gm Q12HR IVP  Last administered on 4/7/20at 20:56;  

Start 3/25/20 at 09:00;  Stop 4/8/20 at 09:58;  Status DC


Daptomycin 500 mg/ Sodium Chloride 50 ml @  100 mls/hr Q48H IV  Last 

administered on 4/10/20at 09:57;  Start 3/25/20 at 08:30;  Stop 4/10/20 at 

10:07;  Status DC


Lidocaine HCl (Buffered Lidocaine 1%) 3 ml 1X  ONCE INJ  Last administered on 

3/25/20at 10:27;  Start 3/25/20 at 10:30;  Stop 3/25/20 at 10:31;  Status DC


Potassium Phosphate 20 mmol/ Sodium Chloride 106.6667 ml @  51.667 m... 1X  ONCE

IV  Last administered on 3/25/20at 12:51;  Start 3/25/20 at 13:00;  Stop 3/25/20

at 15:03;  Status DC


Sodium Chloride 90 meq/Potassium Chloride 15 meq/ Potassium Phosphate 18 mmol/ 

Magnesium Sulfate 8 meq/Calcium Gluconate 15 meq/ Multivitamins 10 ml/Chromium/ 

Copper/Manganese/ Seleni/Zn 0.5 ml/ Total Parenteral Nutrition/Amino 

Acids/Dextrose/ Fat Emulsion Intravenous 1,400 ml @  58.333 mls/ hr TPN  CONT IV

 Last administered on 3/25/20at 22:16;  Start 3/25/20 at 22:00;  Stop 3/26/20 at

21:59;  Status DC


Potassium Chloride 20 meq/ Bicarbonate Dialysis Soln w/ out KCl 5,010 ml @  

1,000 mls/hr Q5H1M IV  Last administered on 3/29/20at 14:54;  Start 3/25/20 at 

16:00;  Stop 3/29/20 at 19:59;  Status DC


Multi-Ingred Cream/Lotion/Oil/ Oint (Artificial Tears Eye Ointment) 1 thomas PRN 

Q1HR  PRN OU DRY EYE, 2nd choice Last administered on 4/13/20at 08:19;  Start 

3/25/20 at 17:30;  Stop 6/3/20 at 14:39;  Status DC


Sodium Chloride 90 meq/Potassium Chloride 15 meq/ Potassium Phosphate 18 mmol/ 

Magnesium Sulfate 8 meq/Calcium Gluconate 15 meq/ Multivitamins 10 ml/Chromium/ 

Copper/Manganese/ Seleni/Zn 0.5 ml/ Total Parenteral Nutrition/Amino 

Acids/Dextrose/ Fat Emulsion Intravenous 1,400 ml @  58.333 mls/ hr TPN  CONT IV

 Last administered on 3/26/20at 22:00;  Start 3/26/20 at 22:00;  Stop 3/27/20 at

21:59;  Status DC


Albumin Human 500 ml @  125 mls/hr 1X  ONCE IV ;  Start 3/26/20 at 14:15;  Stop 

3/26/20 at 18:14;  Status DC


Sodium Chloride 90 meq/Potassium Chloride 15 meq/ Potassium Phosphate 18 mmol/ 

Magnesium Sulfate 8 meq/Calcium Gluconate 15 meq/ Multivitamins 10 ml/Chromium/ 

Copper/Manganese/ Seleni/Zn 0.5 ml/ Insulin Human Regular 10 unit/ Total 

Parenteral Nutrition/Amino Acids/Dextrose/ Fat Emulsion Intravenous 1,400 ml @  

58.333 mls/ hr TPN  CONT IV  Last administered on 3/27/20at 21:43;  Start 

3/27/20 at 22:00;  Stop 3/28/20 at 21:59;  Status DC


Lidocaine HCl (Buffered Lidocaine 1%) 3 ml STK-MED ONCE .ROUTE ;  Start 3/25/20 

at 10:00;  Stop 3/27/20 at 13:57;  Status DC


Midazolam HCl 100 mg/Sodium Chloride 100 ml @ 7 mls/hr CONT  PRN IV SEE PROTOCOL

Last administered on 4/8/20at 15:35;  Start 3/28/20 at 16:00;  Stop 6/3/20 at 

14:38;  Status DC


Sodium Chloride 90 meq/Potassium Chloride 15 meq/ Potassium Phosphate 18 mmol/ 

Magnesium Sulfate 8 meq/Calcium Gluconate 15 meq/ Multivitamins 10 ml/Chromium/ 

Copper/Manganese/ Seleni/Zn 0.5 ml/ Insulin Human Regular 15 unit/ Total 

Parenteral Nutrition/Amino Acids/Dextrose/ Fat Emulsion Intravenous 1,400 ml @  

58.333 mls/ hr TPN  CONT IV  Last administered on 3/28/20at 20:34;  Start 

3/28/20 at 22:00;  Stop 3/29/20 at 21:59;  Status DC


Info ([facility] Electrolyte Protocol) 1 ea CONT PRN  PRN MC PER PROTOCOL;  Start 

3/29/20 at 13:15


Sodium Chloride 90 meq/Potassium Chloride 15 meq/ Potassium Phosphate 18 mmol/ 

Magnesium Sulfate 8 meq/Calcium Gluconate 15 meq/ Multivitamins 10 ml/Chromium/ 

Copper/Manganese/ Seleni/Zn 0.5 ml/ Insulin Human Regular 15 unit/ Total 

Parenteral Nutrition/Amino Acids/Dextrose/ Fat Emulsion Intravenous 1,400 ml @  

58.333 mls/ hr TPN  CONT IV  Last administered on 3/29/20at 22:05;  Start 

3/29/20 at 22:00;  Stop 3/30/20 at 21:59;  Status DC


Potassium Chloride 15 meq/ Bicarbonate Dialysis Soln w/ out KCl 5,007.5 ml  @ 

1,000 mls/ hr Q5H1M IV  Last administered on 4/1/20at 18:14;  Start 3/29/20 at 

20:00;  Stop 4/2/20 at 13:08;  Status DC


Potassium Chloride 15 meq/ Bicarbonate Dialysis Soln w/ out KCl 5,007.5 ml  @ 

1,000 mls/ hr Q5H1M IV  Last administered on 4/1/20at 18:14;  Start 3/29/20 at 

20:00;  Stop 4/2/20 at 13:08;  Status DC


Potassium Chloride 15 meq/ Bicarbonate Dialysis Soln w/ out KCl 5,007.5 ml  @ 

1,000 mls/ hr Q5H1M IV  Last administered on 4/1/20at 18:14;  Start 3/29/20 at 

20:00;  Stop 4/2/20 at 13:08;  Status DC


Iohexol (Omnipaque 240 Mg/ml) 30 ml 1X  ONCE PO  Last administered on 3/30/20at 

11:30;  Start 3/30/20 at 11:30;  Stop 3/30/20 at 11:33;  Status DC


Info (CONTRAST GIVEN -- Rx MONITORING) 1 each PRN DAILY  PRN MC SEE COMMENTS;  

Start 3/30/20 at 11:45;  Stop 4/1/20 at 11:44;  Status DC


Sodium Chloride 90 meq/Potassium Chloride 15 meq/ Potassium Phosphate 18 mmol/ 

Magnesium Sulfate 8 meq/Calcium Gluconate 15 meq/ Multivitamins 10 ml/Chromium/ 

Copper/Manganese/ Seleni/Zn 0.5 ml/ Insulin Human Regular 15 unit/ Total 

Parenteral Nutrition/Amino Acids/Dextrose/ Fat Emulsion Intravenous 1,400 ml @  

58.333 mls/ hr TPN  CONT IV  Last administered on 3/30/20at 21:47;  Start 

3/30/20 at 22:00;  Stop 3/31/20 at 21:59;  Status DC


Sodium Chloride 90 meq/Potassium Chloride 15 meq/ Potassium Phosphate 18 mmol/ 

Magnesium Sulfate 8 meq/Calcium Gluconate 15 meq/ Multivitamins 10 ml/Chromium/ 

Copper/Manganese/ Seleni/Zn 0.5 ml/ Insulin Human Regular 20 unit/ Total 

Parenteral Nutrition/Amino Acids/Dextrose/ Fat Emulsion Intravenous 1,400 ml @  

58.333 mls/ hr TPN  CONT IV  Last administered on 3/31/20at 21:36;  Start 

3/31/20 at 22:00;  Stop 4/1/20 at 21:59;  Status DC


Alteplase, Recombinant (Cathflo For Central Catheter Clearance) 1 mg 1X  ONCE 

INT CAT  Last administered on 3/31/20at 20:03;  Start 3/31/20 at 19:30;  Stop 

3/31/20 at 19:46;  Status DC


Alteplase, Recombinant (Cathflo For Central Catheter Clearance) 1 mg 1X  ONCE 

INT CAT  Last administered on 3/31/20at 22:05;  Start 3/31/20 at 22:00;  Stop 

3/31/20 at 22:01;  Status DC


Sodium Chloride 90 meq/Potassium Chloride 15 meq/ Potassium Phosphate 18 mmol/ 

Magnesium Sulfate 8 meq/Calcium Gluconate 15 meq/ Multivitamins 10 ml/Chromium/ 

Copper/Manganese/ Seleni/Zn 0.5 ml/ Insulin Human Regular 20 unit/ Total 

Parenteral Nutrition/Amino Acids/Dextrose/ Fat Emulsion Intravenous 1,400 ml @  

58.333 mls/ hr TPN  CONT IV  Last administered on 4/1/20at 21:30;  Start 4/1/20 

at 22:00;  Stop 4/2/20 at 21:59;  Status DC


Dexmedetomidine HCl 400 mcg/ Sodium Chloride 100 ml @ 0 mls/hr CONT  PRN IV 

ANXIETY / AGITATION Last administered on 5/30/20at 12:57;  Start 4/2/20 at 

08:15;  Stop 5/30/20 at 18:31;  Status DC


Sodium Chloride 500 ml @  500 mls/hr 1X PRN  PRN IV ELEVATED BP, SEE COMMENTS;  

Start 4/2/20 at 08:15


Atropine Sulfate (ATROPINE 0.5mg SYRINGE) 0.5 mg PRN Q5MIN  PRN IV SEE COMMENTS;

 Start 4/2/20 at 08:15


Furosemide (Lasix) 20 mg 1X  ONCE IVP  Last administered on 4/2/20at 08:19;  

Start 4/2/20 at 08:15;  Stop 4/2/20 at 08:16;  Status DC


Lidocaine HCl (Buffered Lidocaine 1%) 3 ml STK-MED ONCE .ROUTE ;  Start 4/2/20 

at 08:39;  Stop 4/2/20 at 08:39;  Status DC


Lidocaine HCl (Buffered Lidocaine 1%) 6 ml 1X  ONCE INJ  Last administered on 

4/2/20at 09:05;  Start 4/2/20 at 09:00;  Stop 4/2/20 at 09:06;  Status DC


Sodium Chloride 90 meq/Potassium Chloride 15 meq/ Potassium Phosphate 18 mmol/ 

Magnesium Sulfate 8 meq/Calcium Gluconate 15 meq/ Multivitamins 10 ml/Chromium/ 

Copper/Manganese/ Seleni/Zn 0.5 ml/ Insulin Human Regular 20 unit/ Total 

Parenteral Nutrition/Amino Acids/Dextrose/ Fat Emulsion Intravenous 1,400 ml @  

58.333 mls/ hr TPN  CONT IV  Last administered on 4/2/20at 22:45;  Start 4/2/20 

at 22:00;  Stop 4/3/20 at 21:59;  Status DC


Sodium Chloride 1,000 ml @  1,000 mls/hr Q1H PRN IV hypotension;  Start 4/3/20 

at 07:30;  Stop 4/3/20 at 13:29;  Status DC


Albumin Human 200 ml @  200 mls/hr 1X PRN  PRN IV Hypotension Last administered 

on 4/3/20at 09:36;  Start 4/3/20 at 07:30;  Stop 4/3/20 at 13:29;  Status DC


Sodium Chloride (Normal Saline Flush) 10 ml 1X PRN  PRN IV AP catheter pack;  

Start 4/3/20 at 07:30;  Stop 4/3/20 at 21:29;  Status DC


Sodium Chloride (Normal Saline Flush) 10 ml 1X PRN  PRN IV  catheter pack;  

Start 4/3/20 at 07:30;  Stop 4/4/20 at 07:29;  Status DC


Sodium Chloride 1,000 ml @  400 mls/hr Q2H30M PRN IV PATENCY;  Start 4/3/20 at 

07:30;  Stop 4/3/20 at 19:29;  Status DC


Info (PHARMACY MONITORING -- do not chart) 1 each PRN DAILY  PRN MC SEE 

COMMENTS;  Start 4/3/20 at 07:30;  Stop 4/3/20 at 13:02;  Status DC


Info (PHARMACY MONITORING -- do not chart) 1 each PRN DAILY  PRN MC SEE 

COMMENTS;  Start 4/3/20 at 07:30;  Stop 4/5/20 at 12:45;  Status DC


Sodium Chloride 90 meq/Potassium Chloride 15 meq/ Potassium Phosphate 10 mmol/ 

Magnesium Sulfate 8 meq/Calcium Gluconate 15 meq/ Multivitamins 10 ml/Chromium/ 

Copper/Manganese/ Seleni/Zn 0.5 ml/ Insulin Human Regular 25 unit/ Total 

Parenteral Nutrition/Amino Acids/Dextrose/ Fat Emulsion Intravenous 1,400 ml @  

58.333 mls/ hr TPN  CONT IV  Last administered on 4/3/20at 22:19;  Start 4/3/20 

at 22:00;  Stop 4/4/20 at 21:59;  Status DC


Heparin Sodium (Porcine) (Heparin Sodium) 5,000 unit Q12HR SQ  Last administered

on 4/26/20at 08:59;  Start 4/3/20 at 21:00;  Stop 4/26/20 at 10:05;  Status DC


Ondansetron HCl (Zofran) 4 mg PRN Q6HRS  PRN IV NAUSEA/VOMITING;  Start 4/6/20 

at 07:00;  Stop 4/7/20 at 06:59;  Status DC


Fentanyl Citrate (Fentanyl 2ml Vial) 25 mcg PRN Q5MIN  PRN IV MILD PAIN 1-3;  

Start 4/6/20 at 07:00;  Stop 4/7/20 at 06:59;  Status DC


Fentanyl Citrate (Fentanyl 2ml Vial) 50 mcg PRN Q5MIN  PRN IV MODERATE TO SEVERE

PAIN;  Start 4/6/20 at 07:00;  Stop 4/7/20 at 06:59;  Status DC


Ringer's Solution 1,000 ml @  30 mls/hr Q24H IV ;  Start 4/6/20 at 07:00;  Stop 

4/6/20 at 18:59;  Status DC


Lidocaine HCl (Xylocaine-Mpf 1% 2ml Vial) 2 ml PRN 1X  PRN ID PRIOR TO IV START;

 Start 4/6/20 at 07:00;  Stop 4/7/20 at 06:59;  Status DC


Prochlorperazine Edisylate (Compazine) 5 mg PACU PRN  PRN IV NAUSEA, MRX1;  

Start 4/6/20 at 07:00;  Stop 4/7/20 at 06:59;  Status DC


Sodium Chloride 1,000 ml @  1,000 mls/hr Q1H PRN IV hypotension;  Start 4/4/20 

at 09:10;  Stop 4/4/20 at 15:09;  Status DC


Albumin Human 200 ml @  200 mls/hr 1X PRN  PRN IV Hypotension Last administered 

on 4/4/20at 10:10;  Start 4/4/20 at 09:15;  Stop 4/4/20 at 15:14;  Status DC


Sodium Chloride 1,000 ml @  400 mls/hr Q2H30M PRN IV PATENCY;  Start 4/4/20 at 

09:10;  Stop 4/4/20 at 21:09;  Status DC


Info (PHARMACY MONITORING -- do not chart) 1 each PRN DAILY  PRN MC SEE 

COMMENTS;  Start 4/4/20 at 09:15;  Stop 4/5/20 at 12:45;  Status DC


Info (PHARMACY MONITORING -- do not chart) 1 each PRN DAILY  PRN MC SEE 

COMMENTS;  Start 4/4/20 at 09:15;  Stop 4/5/20 at 12:45;  Status DC


Sodium Chloride 90 meq/Potassium Chloride 15 meq/ Potassium Phosphate 10 mmol/ 

Magnesium Sulfate 8 meq/Calcium Gluconate 15 meq/ Multivitamins 10 ml/Chromium/ 

Copper/Manganese/ Seleni/Zn 0.5 ml/ Insulin Human Regular 25 unit/ Total 

Parenteral Nutrition/Amino Acids/Dextrose/ Fat Emulsion Intravenous 1,400 ml @  

58.333 mls/ hr TPN  CONT IV  Last administered on 4/4/20at 22:10;  Start 4/4/20 

at 22:00;  Stop 4/5/20 at 21:59;  Status DC


Magnesium Sulfate 50 ml @ 25 mls/hr PRN DAILY  PRN IV for Mag < 1.7 on am labs 

Last administered on 4/20/20at 17:27;  Start 4/5/20 at 09:15


Sodium Chloride 90 meq/Potassium Chloride 15 meq/ Potassium Phosphate 10 mmol/ 

Magnesium Sulfate 8 meq/Calcium Gluconate 15 meq/ Multivitamins 10 ml/Chromium/ 

Copper/Manganese/ Seleni/Zn 0.5 ml/ Insulin Human Regular 25 unit/ Total 

Parenteral Nutrition/Amino Acids/Dextrose/ Fat Emulsion Intravenous 1,400 ml @  

58.333 mls/ hr TPN  CONT IV  Last administered on 4/5/20at 21:20;  Start 4/5/20 

at 22:00;  Stop 4/6/20 at 21:59;  Status DC


Sodium Chloride 1,000 ml @  1,000 mls/hr Q1H PRN IV hypotension;  Start 4/5/20 

at 12:23;  Stop 4/5/20 at 18:22;  Status DC


Albumin Human 200 ml @  200 mls/hr 1X  ONCE IV  Last administered on 4/5/20at 

13:34;  Start 4/5/20 at 12:30;  Stop 4/5/20 at 13:29;  Status DC


Diphenhydramine HCl (Benadryl) 25 mg 1X PRN  PRN IV ITCHING;  Start 4/5/20 at 

12:30;  Stop 4/6/20 at 12:29;  Status DC


Diphenhydramine HCl (Benadryl) 25 mg 1X PRN  PRN IV ITCHING;  Start 4/5/20 at 

12:30;  Stop 4/6/20 at 12:29;  Status DC


Info (PHARMACY MONITORING -- do not chart) 1 each PRN DAILY  PRN MC SEE 

COMMENTS;  Start 4/5/20 at 12:30;  Status Cancel


Bupivacaine HCl/ Epinephrine Bitart (Sensorcain-Epi 0.5%-1:[id number] Mpf) 30 ml 

STK-MED ONCE .ROUTE  Last administered on 4/6/20at 11:44;  Start 4/6/20 at 

11:00;  Stop 4/6/20 at 11:01;  Status DC


Cellulose (Surgicel Fibrillar 1x2) 1 each STK-MED ONCE .ROUTE ;  Start 4/6/20 at

11:00;  Stop 4/6/20 at 11:01;  Status DC


Sodium Chloride 90 meq/Potassium Chloride 15 meq/ Potassium Phosphate 10 mmol/ 

Magnesium Sulfate 12 meq/Calcium Gluconate 15 meq/ Multivitamins 10 ml/Chromium/

Copper/Manganese/ Seleni/Zn 0.5 ml/ Insulin Human Regular 25 unit/ Total 

Parenteral Nutrition/Amino Acids/Dextrose/ Fat Emulsion Intravenous 1,400 ml @  

58.333 mls/ hr TPN  CONT IV  Last administered on 4/6/20at 22:24;  Start 4/6/20 

at 22:00;  Stop 4/7/20 at 21:59;  Status DC


Propofol 20 ml @ As Directed STK-MED ONCE IV ;  Start 4/6/20 at 11:07;  Stop 4/6 /20 at 11:07;  Status DC


Cellulose (Surgicel Hemostat 4x8) 1 each STK-MED ONCE .ROUTE  Last administered 

on 4/6/20at 11:44;  Start 4/6/20 at 11:55;  Stop 4/6/20 at 11:56;  Status DC


Sevoflurane (Ultane) 60 ml STK-MED ONCE IH ;  Start 4/6/20 at 12:46;  Stop 

4/6/20 at 12:46;  Status DC


Sodium Chloride 1,000 ml @  1,000 mls/hr Q1H PRN IV hypotension;  Start 4/6/20 

at 13:51;  Stop 4/6/20 at 19:50;  Status DC


Albumin Human 200 ml @  200 mls/hr 1X PRN  PRN IV Hypotension Last administered 

on 4/6/20at 14:51;  Start 4/6/20 at 14:00;  Stop 4/6/20 at 19:59;  Status DC


Diphenhydramine HCl (Benadryl) 25 mg 1X PRN  PRN IV ITCHING;  Start 4/6/20 at 

14:00;  Stop 4/7/20 at 13:59;  Status DC


Diphenhydramine HCl (Benadryl) 25 mg 1X PRN  PRN IV ITCHING;  Start 4/6/20 at 

14:00;  Stop 4/7/20 at 13:59;  Status DC


Sodium Chloride 1,000 ml @  400 mls/hr Q2H30M PRN IV PATENCY;  Start 4/6/20 at 

13:51;  Stop 4/7/20 at 01:50;  Status DC


Info (PHARMACY MONITORING -- do not chart) 1 each PRN DAILY  PRN MC SEE 

COMMENTS;  Start 4/6/20 at 14:00;  Stop 4/9/20 at 08:16;  Status DC


Heparin Sodium (Porcine) (Hep Lock Adult) 500 unit STK-MED ONCE IVP ;  Start 

4/7/20 at 09:29;  Stop 4/7/20 at 09:30;  Status DC


Sodium Chloride 1,000 ml @  1,000 mls/hr Q1H PRN IV hypotension;  Start 4/7/20 

at 10:43;  Stop 4/7/20 at 16:42;  Status DC


Sodium Chloride 1,000 ml @  400 mls/hr Q2H30M PRN IV PATENCY;  Start 4/7/20 at 

10:43;  Stop 4/7/20 at 22:42;  Status DC


Info (PHARMACY MONITORING -- do not chart) 1 each PRN DAILY  PRN MC SEE 

COMMENTS;  Start 4/7/20 at 10:45;  Status UNV


Info (PHARMACY MONITORING -- do not chart) 1 each PRN DAILY  PRN MC SEE 

COMMENTS;  Start 4/7/20 at 10:45;  Status UNV


Sodium Chloride 90 meq/Potassium Chloride 15 meq/ Magnesium Sulfate 12 

meq/Calcium Gluconate 15 meq/ Multivitamins 10 ml/Chromium/ Copper/Manganese/ 

Seleni/Zn 0.5 ml/ Insulin Human Regular 25 unit/ Total Parenteral 

Nutrition/Amino Acids/Dextrose/ Fat Emulsion Intravenous 1,400 ml @  58.333 mls/

hr TPN  CONT IV  Last administered on 4/7/20at 22:13;  Start 4/7/20 at 22:00;  

Stop 4/8/20 at 21:59;  Status DC


Sodium Chloride 1,000 ml @  1,000 mls/hr Q1H PRN IV hypotension;  Start 4/8/20 

at 07:50;  Stop 4/8/20 at 13:49;  Status DC


Albumin Human 200 ml @  200 mls/hr 1X  ONCE IV ;  Start 4/8/20 at 08:00;  Stop 

4/8/20 at 08:53;  Status DC


Diphenhydramine HCl (Benadryl) 25 mg 1X PRN  PRN IV ITCHING;  Start 4/8/20 at 

08:00;  Stop 4/9/20 at 07:59;  Status DC


Diphenhydramine HCl (Benadryl) 25 mg 1X PRN  PRN IV ITCHING;  Start 4/8/20 at 

08:00;  Stop 4/9/20 at 07:59;  Status DC


Info (PHARMACY MONITORING -- do not chart) 1 each PRN DAILY  PRN MC SEE 

COMMENTS;  Start 4/8/20 at 08:00;  Stop 4/9/20 at 08:16;  Status DC


Albumin Human 50 ml @ 50 mls/hr 1X  ONCE IV ;  Start 4/8/20 at 08:53;  Stop 

4/8/20 at 08:56;  Status DC


Albumin Human 200 ml @  50 mls/hr PRN 1X  PRN IV HYPOTENSION Last administered 

on 4/14/20at 11:54;  Start 4/8/20 at 09:00;  Stop 5/21/20 at 11:14;  Status DC


Meropenem 500 mg/ Sodium Chloride 50 ml @  100 mls/hr Q12H IV  Last administered

on 4/28/20at 10:45;  Start 4/8/20 at 10:00;  Stop 4/28/20 at 12:37;  Status DC


Sodium Chloride 90 meq/Magnesium Sulfate 12 meq/ Calcium Gluconate 15 meq/ 

Multivitamins 10 ml/Chromium/ Copper/Manganese/ Seleni/Zn 0.5 ml/ Insulin Human 

Regular 25 unit/ Total Parenteral Nutrition/Amino Acids/Dextrose/ Fat Emulsion 

Intravenous 1,400 ml @  58.333 mls/ hr TPN  CONT IV  Last administered on 

4/8/20at 21:41;  Start 4/8/20 at 22:00;  Stop 4/9/20 at 21:59;  Status DC


Sodium Chloride 1,000 ml @  1,000 mls/hr Q1H PRN IV hypotension;  Start 4/9/20 

at 07:58;  Stop 4/9/20 at 13:57;  Status DC


Albumin Human 200 ml @  200 mls/hr 1X PRN  PRN IV Hypotension Last administered 

on 4/9/20at 09:30;  Start 4/9/20 at 08:00;  Stop 4/9/20 at 13:59;  Status DC


Sodium Chloride 1,000 ml @  400 mls/hr Q2H30M PRN IV PATENCY;  Start 4/9/20 at 

07:58;  Stop 4/9/20 at 19:57;  Status DC


Info (PHARMACY MONITORING -- do not chart) 1 each PRN DAILY  PRN MC SEE MIKEY

TS;  Start 4/9/20 at 08:00;  Status Cancel


Info (PHARMACY MONITORING -- do not chart) 1 each PRN DAILY  PRN MC SEE 

COMMENTS;  Start 4/9/20 at 08:15;  Status UNV


Sodium Chloride 90 meq/Potassium Phosphate 5 mmol/ Magnesium Sulfate 12 

meq/Calcium Gluconate 15 meq/ Multivitamins 10 ml/Chromium/ Copper/Manganese/ 

Seleni/Zn 0.5 ml/ Insulin Human Regular 30 unit/ Total Parenteral Nutriti

on/Amino Acids/Dextrose/ Fat Emulsion Intravenous 1,400 ml @  58.333 mls/ hr TPN

 CONT IV  Last administered on 4/9/20at 22:08;  Start 4/9/20 at 22:00;  Stop 

4/10/20 at 21:59;  Status DC


Linezolid/Dextrose 300 ml @  300 mls/hr Q12HR IV  Last administered on 4/20/20at

20:40;  Start 4/10/20 at 11:00;  Stop 4/21/20 at 08:10;  Status DC


Sodium Chloride 90 meq/Potassium Phosphate 15 mmol/ Magnesium Sulfate 12 

meq/Calcium Gluconate 15 meq/ Multivitamins 10 ml/Chromium/ Copper/Manganese/ 

Seleni/Zn 0.5 ml/ Insulin Human Regular 30 unit/ Total Parenteral 

Nutrition/Amino Acids/Dextrose/ Fat Emulsion Intravenous 1,400 ml @  58.333 mls/

hr TPN  CONT IV  Last administered on 4/10/20at 21:49;  Start 4/10/20 at 22:00; 

Stop 4/11/20 at 21:59;  Status DC


Sodium Chloride 90 meq/Potassium Phosphate 15 mmol/ Magnesium Sulfate 12 

meq/Calcium Gluconate 15 meq/ Multivitamins 10 ml/Chromium/ Copper/Manganese/ 

Seleni/Zn 0.5 ml/ Insulin Human Regular 40 unit/ Total Parenteral 

Nutrition/Amino Acids/Dextrose/ Fat Emulsion Intravenous 1,400 ml @  58.333 mls/

hr TPN  CONT IV  Last administered on 4/11/20at 21:21;  Start 4/11/20 at 22:00; 

Stop 4/12/20 at 21:59;  Status DC


Sodium Chloride 1,000 ml @  1,000 mls/hr Q1H PRN IV hypotension;  Start 4/11/20 

at 13:26;  Stop 4/11/20 at 19:25;  Status DC


Albumin Human 200 ml @  200 mls/hr 1X PRN  PRN IV Hypotension Last administered 

on 4/11/20at 15:00;  Start 4/11/20 at 13:30;  Stop 4/11/20 at 19:29;  Status DC


Sodium Chloride (Normal Saline Flush) 10 ml 1X PRN  PRN IV AP catheter pack;  

Start 4/11/20 at 13:30;  Stop 4/12/20 at 13:29;  Status DC


Sodium Chloride (Normal Saline Flush) 10 ml 1X PRN  PRN IV  catheter pack;  

Start 4/11/20 at 13:30;  Stop 4/12/20 at 13:29;  Status DC


Sodium Chloride 1,000 ml @  400 mls/hr Q2H30M PRN IV PATENCY;  Start 4/11/20 at 

13:26;  Stop 4/12/20 at 01:25;  Status DC


Info (PHARMACY MONITORING -- do not chart) 1 each PRN DAILY  PRN MC SEE MIKEY

TS;  Start 4/11/20 at 13:30;  Stop 4/11/20 at 13:33;  Status DC


Info (PHARMACY MONITORING -- do not chart) 1 each PRN DAILY  PRN MC SEE 

COMMENTS;  Start 4/11/20 at 13:30;  Stop 4/11/20 at 13:34;  Status DC


Sodium Chloride 90 meq/Potassium Phosphate 19 mmol/ Magnesium Sulfate 12 

meq/Calcium Gluconate 15 meq/ Multivitamins 10 ml/Chromium/ Copper/Manganese/ 

Seleni/Zn 0.5 ml/ Insulin Human Regular 40 unit/ Total Parenteral 

Nutrition/Amino Acids/Dextrose/ Fat Emulsion Intravenous 1,400 ml @  58.333 mls/

hr TPN  CONT IV  Last administered on 4/12/20at 21:54;  Start 4/12/20 at 22:00; 

Stop 4/13/20 at 21:59;  Status DC


Sodium Chloride 1,000 ml @  1,000 mls/hr Q1H PRN IV hypotension;  Start 4/13/20 

at 09:35;  Stop 4/13/20 at 15:34;  Status DC


Albumin Human 200 ml @  200 mls/hr 1X PRN  PRN IV Hypotension;  Start 4/13/20 at

09:45;  Stop 4/13/20 at 15:44;  Status DC


Diphenhydramine HCl (Benadryl) 25 mg 1X PRN  PRN IV ITCHING;  Start 4/13/20 at 

09:45;  Stop 4/14/20 at 09:44;  Status DC


Diphenhydramine HCl (Benadryl) 25 mg 1X PRN  PRN IV ITCHING;  Start 4/13/20 at 

09:45;  Stop 4/14/20 at 09:44;  Status DC


Sodium Chloride 1,000 ml @  400 mls/hr Q2H30M PRN IV PATENCY;  Start 4/13/20 at 

09:35;  Stop 4/13/20 at 21:34;  Status DC


Info (PHARMACY MONITORING -- do not chart) 1 each PRN DAILY  PRN MC SEE 

COMMENTS;  Start 4/13/20 at 09:45;  Status Cancel


Sodium Chloride 100 meq/Potassium Phosphate 19 mmol/ Magnesium Sulfate 12 

meq/Calcium Gluconate 15 meq/ Multivitamins 10 ml/Chromium/ Copper/Manganese/ 

Seleni/Zn 0.5 ml/ Insulin Human Regular 40 unit/ Potassium Chloride 20 meq/ 

Total Parenteral Nutrition/Amino Acids/Dextrose/ Fat Emulsion Intravenous 1,400 

ml @  58.333 mls/ hr TPN  CONT IV  Last administered on 4/13/20at 22:02;  Start 

4/13/20 at 22:00;  Stop 4/14/20 at 21:59;  Status DC


Furosemide (Lasix) 40 mg 1X  ONCE IVP  Last administered on 4/13/20at 14:39;  

Start 4/13/20 at 14:30;  Stop 4/13/20 at 14:31;  Status DC


Metronidazole 100 ml @  100 mls/hr Q8HRS IV  Last administered on 4/21/20at 

06:04;  Start 4/14/20 at 10:00;  Stop 4/21/20 at 08:10;  Status DC


Sodium Chloride 1,000 ml @  1,000 mls/hr Q1H PRN IV hypotension;  Start 4/14/20 

at 08:00;  Stop 4/14/20 at 13:59;  Status DC


Albumin Human 200 ml @  200 mls/hr 1X PRN  PRN IV Hypotension;  Start 4/14/20 at

08:00;  Stop 4/14/20 at 13:59;  Status DC


Sodium Chloride 1,000 ml @  400 mls/hr Q2H30M PRN IV PATENCY;  Start 4/14/20 at 

08:00;  Stop 4/14/20 at 19:59;  Status DC


Info (PHARMACY MONITORING -- do not chart) 1 each PRN DAILY  PRN MC SEE 

COMMENTS;  Start 4/14/20 at 11:30;  Status UNV


Info (PHARMACY MONITORING -- do not chart) 1 each PRN DAILY  PRN MC SEE 

COMMENTS;  Start 4/14/20 at 11:30;  Stop 4/16/20 at 12:13;  Status DC


Sodium Chloride 100 meq/Potassium Phosphate 19 mmol/ Magnesium Sulfate 12 

meq/Calcium Gluconate 15 meq/ Multivitamins 10 ml/Chromium/ Copper/Manganese/ 

Seleni/Zn 0.5 ml/ Insulin Human Regular 40 unit/ Potassium Chloride 20 meq/ 

Total Parenteral Nutrition/Amino Acids/Dextrose/ Fat Emulsion Intravenous 1,400 

ml @  58.333 mls/ hr TPN  CONT IV  Last administered on 4/14/20at 21:52;  Start 

4/14/20 at 22:00;  Stop 4/15/20 at 21:59;  Status DC


Sodium Chloride (Normal Saline Flush) 10 ml QSHIFT  PRN IV AFTER MEDS AND BLOOD 

DRAWS;  Start 4/14/20 at 15:00;  Stop 5/12/20 at 11:27;  Status DC


Sodium Chloride (Normal Saline Flush) 10 ml PRN Q5MIN  PRN IV AFTER MEDS AND 

BLOOD DRAWS;  Start 4/14/20 at 15:00


Sodium Chloride (Normal Saline Flush) 20 ml PRN Q5MIN  PRN IV AFTER MEDS AND 

BLOOD DRAWS;  Start 4/14/20 at 15:00


Sodium Chloride 100 meq/Potassium Phosphate 19 mmol/ Magnesium Sulfate 12 

meq/Calcium Gluconate 15 meq/ Multivitamins 10 ml/Chromium/ Copper/Manganese/ 

Seleni/Zn 0.5 ml/ Insulin Human Regular 40 unit/ Potassium Chloride 20 meq/ 

Total Parenteral Nutrition/Amino Acids/Dextrose/ Fat Emulsion Intravenous 1,400 

ml @  58.333 mls/ hr TPN  CONT IV  Last administered on 4/15/20at 21:20;  Start 

4/15/20 at 22:00;  Stop 4/16/20 at 21:59;  Status DC


Lidocaine HCl (Buffered Lidocaine 1%) 3 ml STK-MED ONCE .ROUTE ;  Start 4/15/20 

at 13:16;  Stop 4/15/20 at 13:16;  Status DC


Lidocaine HCl (Buffered Lidocaine 1%) 6 ml 1X  ONCE INJ  Last administered on 

4/15/20at 13:45;  Start 4/15/20 at 13:30;  Stop 4/15/20 at 13:31;  Status DC


Albumin Human 100 ml @  100 mls/hr 1X  ONCE IV  Last administered on 4/15/20at 

15:41;  Start 4/15/20 at 15:00;  Stop 4/15/20 at 15:59;  Status DC


Albumin Human 50 ml @ 50 mls/hr 1X  ONCE IV  Last administered on 4/15/20at 

15:00;  Start 4/15/20 at 15:00;  Stop 4/15/20 at 15:59;  Status DC


Info (PHARMACY MONITORING -- do not chart) 1 each PRN DAILY  PRN MC SEE COMME

NTS;  Start 4/16/20 at 11:30;  Status Cancel


Info (PHARMACY MONITORING -- do not chart) 1 each PRN DAILY  PRN MC SEE 

COMMENTS;  Start 4/16/20 at 11:30;  Status UNV


Sodium Chloride 100 meq/Potassium Phosphate 10 mmol/ Magnesium Sulfate 12 

meq/Calcium Gluconate 15 meq/ Multivitamins 10 ml/Chromium/ Copper/Manganese/ 

Seleni/Zn 0.5 ml/ Insulin Human Regular 35 unit/ Potassium Chloride 20 meq/ 

Total Parenteral Nutrition/Amino Acids/Dextrose/ Fat Emulsion Intravenous 1,400 

ml @  58.333 mls/ hr TPN  CONT IV  Last administered on 4/16/20at 22:10;  Start 

4/16/20 at 22:00;  Stop 4/17/20 at 21:59;  Status DC


Sodium Chloride 100 meq/Potassium Phosphate 5 mmol/ Magnesium Sulfate 12 

meq/Calcium Gluconate 15 meq/ Multivitamins 10 ml/Chromium/ Copper/Manganese/ 

Seleni/Zn 0.5 ml/ Insulin Human Regular 35 unit/ Potassium Chloride 20 meq/ 

Total Parenteral Nutrition/Amino Acids/Dextrose/ Fat Emulsion Intravenous 1,400 

ml @  58.333 mls/ hr TPN  CONT IV  Last administered on 4/17/20at 22:59;  Start 

4/17/20 at 22:00;  Stop 4/18/20 at 21:59;  Status DC


Sodium Chloride 1,000 ml @  1,000 mls/hr Q1H PRN IV hypotension;  Start 4/18/20 

at 08:27;  Stop 4/18/20 at 14:26;  Status DC


Albumin Human 200 ml @  200 mls/hr 1X PRN  PRN IV Hypotension Last administered 

on 4/18/20at 09:18;  Start 4/18/20 at 08:30;  Stop 4/18/20 at 14:29;  Status DC


Sodium Chloride 1,000 ml @  400 mls/hr Q2H30M PRN IV PATENCY;  Start 4/18/20 at 

08:27;  Stop 4/18/20 at 20:26;  Status DC


Info (PHARMACY MONITORING -- do not chart) 1 each PRN DAILY  PRN MC SEE 

COMMENTS;  Start 4/18/20 at 08:30;  Status Cancel


Info (PHARMACY MONITORING -- do not chart) 1 each PRN DAILY  PRN MC SEE 

COMMENTS;  Start 4/18/20 at 08:30;  Stop 4/26/20 at 13:10;  Status DC


Sodium Chloride 100 meq/Potassium Chloride 40 meq/ Magnesium Sulfate 15 meq/

Calcium Gluconate 15 meq/ Multivitamins 10 ml/Chromium/ Copper/Manganese/ 

Seleni/Zn 0.5 ml/ Insulin Human Regular 35 unit/ Total Parenteral 

Nutrition/Amino Acids/Dextrose/ Fat Emulsion Intravenous 1,400 ml @  58.333 mls/

hr TPN  CONT IV  Last administered on 4/18/20at 22:00;  Start 4/18/20 at 22:00; 

Stop 4/19/20 at 21:59;  Status DC


Potassium Chloride/Water 100 ml @  100 mls/hr 1X  ONCE IV  Last administered on 

4/18/20at 17:28;  Start 4/18/20 at 14:45;  Stop 4/18/20 at 15:44;  Status DC


Sodium Chloride 100 meq/Potassium Chloride 40 meq/ Magnesium Sulfate 15 

meq/Calcium Gluconate 15 meq/ Multivitamins 10 ml/Chromium/ Copper/Manganese/ 

Seleni/Zn 0.5 ml/ Insulin Human Regular 35 unit/ Total Parenteral 

Nutrition/Amino Acids/Dextrose/ Fat Emulsion Intravenous 1,400 ml @  58.333 mls/

hr TPN  CONT IV  Last administered on 4/19/20at 22:46;  Start 4/19/20 at 22:00; 

Stop 4/20/20 at 21:59;  Status DC


Sodium Chloride 100 meq/Potassium Chloride 40 meq/ Magnesium Sulfate 20 

meq/Calcium Gluconate 15 meq/ Multivitamins 10 ml/Chromium/ Copper/Manganese/ 

Seleni/Zn 0.5 ml/ Insulin Human Regular 35 unit/ Total Parenteral 

Nutrition/Amino Acids/Dextrose/ Fat Emulsion Intravenous 1,400 ml @  58.333 mls/

hr TPN  CONT IV  Last administered on 4/20/20at 22:31;  Start 4/20/20 at 22:00; 

Stop 4/21/20 at 21:59;  Status DC


Fentanyl Citrate (Fentanyl 2ml Vial) 50 mcg PRN Q2HR  PRN IVP PAIN Last 

administered on 4/27/20at 13:32;  Start 4/20/20 at 21:00;  Stop 4/28/20 at 

12:53;  Status DC


Fentanyl Citrate (Fentanyl 2ml Vial) 25 mcg PRN Q2HR  PRN IVP PAIN;  Start 

4/20/20 at 21:00;  Stop 4/28/20 at 12:54;  Status DC


Enoxaparin Sodium (Lovenox 100mg Syringe) 100 mg Q12HR SQ ;  Start 4/21/20 at 

21:00;  Status UNV


Amino Acids/ Glycerin/ Electrolytes 1,000 ml @  75 mls/hr [id number] IV ;  Start 

4/20/20 at 21:15;  Status UNV


Sodium Chloride 1,000 ml @  1,000 mls/hr Q1H PRN IV hypotension;  Start 4/21/20 

at 07:56;  Stop 4/21/20 at 13:55;  Status DC


Albumin Human 200 ml @  200 mls/hr 1X PRN  PRN IV Hypotension Last administered 

on 4/21/20at 08:40;  Start 4/21/20 at 08:00;  Stop 4/21/20 at 13:59;  Status DC


Sodium Chloride 1,000 ml @  400 mls/hr Q2H30M PRN IV PATENCY;  Start 4/21/20 at 

07:56;  Stop 4/21/20 at 19:55;  Status DC


Info (PHARMACY MONITORING -- do not chart) 1 each PRN DAILY  PRN MC SEE 

COMMENTS;  Start 4/21/20 at 08:00;  Status UNV


Info (PHARMACY MONITORING -- do not chart) 1 each PRN DAILY  PRN MC SEE 

COMMENTS;  Start 4/21/20 at 08:00;  Status UNV


Daptomycin 430 mg/ Sodium Chloride 50 ml @  100 mls/hr Q24H IV  Last 

administered on 4/21/20at 12:35;  Start 4/21/20 at 09:00;  Stop 4/21/20 at 

12:49;  Status DC


Sodium Chloride 100 meq/Potassium Chloride 40 meq/ Magnesium Sulfate 20 

meq/Calcium Gluconate 15 meq/ Multivitamins 10 ml/Chromium/ Copper/Manganese/ 

Seleni/Zn 0.5 ml/ Insulin Human Regular 35 unit/ Total Parenteral 

Nutrition/Amino Acids/Dextrose/ Fat Emulsion Intravenous 1,400 ml @  58.333 mls/

hr TPN  CONT IV  Last administered on 4/21/20at 21:26;  Start 4/21/20 at 22:00; 

Stop 4/22/20 at 21:59;  Status DC


Daptomycin 430 mg/ Sodium Chloride 50 ml @  100 mls/hr Q48H IV ;  Start 4/23/20 

at 09:00;  Stop 4/22/20 at 11:55;  Status DC


Sodium Chloride 100 meq/Potassium Chloride 40 meq/ Magnesium Sulfate 20 

meq/Calcium Gluconate 15 meq/ Multivitamins 10 ml/Chromium/ Copper/Manganese/ 

Seleni/Zn 0.5 ml/ Insulin Human Regular 35 unit/ Total Parenteral 

Nutrition/Amino Acids/Dextrose/ Fat Emulsion Intravenous 1,400 ml @  58.333 mls/

hr TPN  CONT IV  Last administered on 4/22/20at 22:27;  Start 4/22/20 at 22:00; 

Stop 4/23/20 at 21:59;  Status DC


Daptomycin 430 mg/ Sodium Chloride 50 ml @  100 mls/hr Q24H IV  Last 

administered on 4/24/20at 15:07;  Start 4/22/20 at 13:00;  Stop 4/25/20 at 

13:15;  Status DC


Sodium Chloride 100 meq/Potassium Chloride 40 meq/ Magnesium Sulfate 20 

meq/Calcium Gluconate 10 meq/ Multivitamins 10 ml/Chromium/ Copper/Manganese/ 

Seleni/Zn 0.5 ml/ Insulin Human Regular 35 unit/ Total Parenteral 

Nutrition/Amino Acids/Dextrose/ Fat Emulsion Intravenous 1,400 ml @  58.333 mls/

hr TPN  CONT IV  Last administered on 4/24/20at 00:06;  Start 4/23/20 at 22:00; 

Stop 4/24/20 at 21:59;  Status DC


Alteplase, Recombinant (Cathflo For Central Catheter Clearance) 1 mg 1X  ONCE I

NT CAT  Last administered on 4/24/20at 11:44;  Start 4/24/20 at 10:45;  Stop 

4/24/20 at 10:46;  Status DC


Ondansetron HCl (Zofran) 4 mg PRN Q6HRS  PRN IV NAUSEA/VOMITING;  Start 4/27/20 

at 07:00;  Stop 4/28/20 at 06:59;  Status DC


Fentanyl Citrate (Fentanyl 2ml Vial) 25 mcg PRN Q5MIN  PRN IV MILD PAIN 1-3;  

Start 4/27/20 at 07:00;  Stop 4/28/20 at 06:59;  Status DC


Fentanyl Citrate (Fentanyl 2ml Vial) 50 mcg PRN Q5MIN  PRN IV MODERATE TO SEVERE

PAIN Last administered on 4/27/20at 10:17;  Start 4/27/20 at 07:00;  Stop 

4/28/20 at 06:59;  Status DC


Ringer's Solution 1,000 ml @  30 mls/hr Q24H IV ;  Start 4/27/20 at 07:00;  Stop

4/27/20 at 18:59;  Status DC


Lidocaine HCl (Xylocaine-Mpf 1% 2ml Vial) 2 ml PRN 1X  PRN ID PRIOR TO IV START;

 Start 4/27/20 at 07:00;  Stop 4/28/20 at 06:59;  Status DC


Prochlorperazine Edisylate (Compazine) 5 mg PACU PRN  PRN IV NAUSEA, MRX1;  

Start 4/27/20 at 07:00;  Stop 4/28/20 at 06:59;  Status DC


Sodium Acetate 50 meq/Potassium Acetate 55 meq/ Magnesium Sulfate 20 meq/Calcium

Gluconate 10 meq/ Multivitamins 10 ml/Chromium/ Copper/Manganese/ Seleni/Zn 0.5 

ml/ Insulin Human Regular 35 unit/ Total Parenteral Nutrition/Amino 

Acids/Dextrose/ Fat Emulsion Intravenous 1,400 ml @  58.333 mls/ hr TPN  CONT IV

;  Start 4/24/20 at 22:00;  Stop 4/24/20 at 14:15;  Status DC


Sodium Acetate 50 meq/Potassium Acetate 55 meq/ Magnesium Sulfate 20 meq/Calcium

Gluconate 10 meq/ Multivitamins 10 ml/Chromium/ Copper/Manganese/ Seleni/Zn 0.5 

ml/ Insulin Human Regular 35 unit/ Total Parenteral Nutrition/Amino 

Acids/Dextrose/ Fat Emulsion Intravenous 1,800 ml @  75 mls/hr TPN  CONT IV  

Last administered on 4/24/20at 22:38;  Start 4/24/20 at 22:00;  Stop 4/25/20 at 

21:59;  Status DC


Sodium Chloride 1,000 ml @  1,000 mls/hr Q1H PRN IV hypotension;  Start 4/24/20 

at 15:31;  Stop 4/24/20 at 21:30;  Status DC


Diphenhydramine HCl (Benadryl) 25 mg 1X PRN  PRN IV ITCHING;  Start 4/24/20 at 

15:45;  Stop 4/25/20 at 15:44;  Status DC


Diphenhydramine HCl (Benadryl) 25 mg 1X PRN  PRN IV ITCHING;  Start 4/24/20 at 

15:45;  Stop 4/25/20 at 15:44;  Status DC


Sodium Chloride 1,000 ml @  400 mls/hr Q2H30M PRN IV PATENCY;  Start 4/24/20 at 

15:31;  Stop 4/25/20 at 03:30;  Status DC


Info (PHARMACY MONITORING -- do not chart) 1 each PRN DAILY  PRN MC SEE 

COMMENTS;  Start 4/24/20 at 15:45;  Stop 5/26/20 at 14:14;  Status DC


Sodium Acetate 50 meq/Potassium Acetate 55 meq/ Magnesium Sulfate 20 meq/Calcium

Gluconate 10 meq/ Multivitamins 10 ml/Chromium/ Copper/Manganese/ Seleni/Zn 0.5 

ml/ Insulin Human Regular 35 unit/ Total Parenteral Nutrition/Amino 

Acids/Dextrose/ Fat Emulsion Intravenous 1,800 ml @  75 mls/hr TPN  CONT IV  

Last administered on 4/25/20at 22:03;  Start 4/25/20 at 22:00;  Stop 4/26/20 at 

21:59;  Status DC


Daptomycin 430 mg/ Sodium Chloride 50 ml @  100 mls/hr Q24H IV  Last 

administered on 4/30/20at 13:00;  Start 4/25/20 at 13:00;  Stop 4/30/20 at 

20:58;  Status DC


Heparin Sodium (Porcine) 1000 unit/Sodium Chloride 1,001 ml @  1,001 mls/hr 1X  

ONCE IRR ;  Start 4/27/20 at 06:00;  Stop 4/27/20 at 06:59;  Status DC


Potassium Acetate 55 meq/Magnesium Sulfate 20 meq/ Calcium Gluconate 10 meq/ 

Multivitamins 10 ml/Chromium/ Copper/Manganese/ Seleni/Zn 0.5 ml/ Insulin Human 

Regular 35 unit/ Total Parenteral Nutrition/Amino Acids/Dextrose/ Fat Emulsion 

Intravenous 1,920 ml @  80 mls/hr TPN  CONT IV  Last administered on 4/26/20at 

22:10;  Start 4/26/20 at 22:00;  Stop 4/27/20 at 21:59;  Status DC


Dexamethasone Sodium Phosphate (Decadron) 4 mg STK-MED ONCE .ROUTE ;  Start 

4/27/20 at 10:56;  Stop 4/27/20 at 10:57;  Status DC


Ondansetron HCl (Zofran) 4 mg STK-MED ONCE .ROUTE ;  Start 4/27/20 at 10:56;  

Stop 4/27/20 at 10:57;  Status DC


Rocuronium Bromide (Zemuron) 50 mg STK-MED ONCE .ROUTE ;  Start 4/27/20 at 

10:56;  Stop 4/27/20 at 10:57;  Status DC


Fentanyl Citrate (Fentanyl 2ml Vial) 100 mcg STK-MED ONCE .ROUTE ;  Start 

4/27/20 at 10:56;  Stop 4/27/20 at 10:57;  Status DC


Bupivacaine HCl/ Epinephrine Bitart (Sensorcain-Epi 0.5%-1:406400 Mpf) 30 ml 

STK-MED ONCE .ROUTE  Last administered on 4/27/20at 12:01;  Start 4/27/20 at 

10:58;  Stop 4/27/20 at 10:58;  Status DC


Cellulose (Surgicel Hemostat 2x14) 1 each STK-MED ONCE .ROUTE ;  Start 4/27/20 

at 10:58;  Stop 4/27/20 at 10:59;  Status DC


Iohexol (Omnipaque 300 Mg/ml) 50 ml STK-MED ONCE .ROUTE ;  Start 4/27/20 at 

10:58;  Stop 4/27/20 at 10:59;  Status DC


Cellulose (Surgicel Hemostat 4x8) 1 each STK-MED ONCE .ROUTE ;  Start 4/27/20 at

10:58;  Stop 4/27/20 at 10:59;  Status DC


Bisacodyl (Dulcolax Supp) 10 mg STK-MED ONCE .ROUTE ;  Start 4/27/20 at 10:59;  

Stop 4/27/20 at 10:59;  Status DC


Heparin Sodium (Porcine) 1000 unit/Sodium Chloride 1,001 ml @  1,001 mls/hr 1X  

ONCE IRR ;  Start 4/27/20 at 12:00;  Stop 4/27/20 at 12:59;  Status DC


Propofol 20 ml @ As Directed STK-MED ONCE IV ;  Start 4/27/20 at 11:05;  Stop 

4/27/20 at 11:05;  Status DC


Sevoflurane (Ultane) 90 ml STK-MED ONCE IH ;  Start 4/27/20 at 11:05;  Stop 

4/27/20 at 11:05;  Status DC


Sevoflurane (Ultane) 60 ml STK-MED ONCE IH ;  Start 4/27/20 at 12:26;  Stop 

4/27/20 at 12:27;  Status DC


Propofol 20 ml @ As Directed STK-MED ONCE IV ;  Start 4/27/20 at 12:26;  Stop 

4/27/20 at 12:27;  Status DC


Phenylephrine HCl (PHENYLEPHRINE in 0.9% NACL PF) 1 mg STK-MED ONCE IV ;  Start 

4/27/20 at 12:34;  Stop 4/27/20 at 12:34;  Status DC


Heparin Sodium (Porcine) (Heparin Sodium) 5,000 unit Q12HR SQ  Last administered

on 5/6/20at 20:57;  Start 4/27/20 at 21:00;  Stop 5/7/20 at 09:59;  Status DC


Sodium Chloride (Normal Saline Flush) 3 ml QSHIFT  PRN IV AFTER MEDS AND BLOOD 

DRAWS;  Start 4/27/20 at 13:45


Naloxone HCl (Narcan) 0.4 mg PRN Q2MIN  PRN IV SEE INSTRUCTIONS Last 

administered on 6/6/20at 15:15;  Start 4/27/20 at 13:45


Sodium Chloride 1,000 ml @  25 mls/hr Q24H IV  Last administered on 5/26/20at 

13:37;  Start 4/27/20 at 13:37;  Stop 5/29/20 at 13:09;  Status DC


Naloxone HCl (Narcan) 0.4 mg PRN Q2MIN  PRN IV SEE INSTRUCTIONS;  Start 4/27/20 

at 14:30;  Status UNV


Sodium Chloride 1,000 ml @  25 mls/hr Q24H IV ;  Start 4/27/20 at 14:30;  Status

UNV


Hydromorphone HCl 30 ml @ 0 mls/hr CONT PRN  PRN IV PER PROTOCOL Last 

administered on 5/2/20at 16:08;  Start 4/27/20 at 14:30;  Stop 5/4/20 at 08:55; 

Status DC


Potassium Acetate 55 meq/Magnesium Sulfate 20 meq/ Calcium Gluconate 10 meq/ 

Multivitamins 10 ml/Chromium/ Copper/Manganese/ Seleni/Zn 0.5 ml/ Insulin Human 

Regular 35 unit/ Total Parenteral Nutrition/Amino Acids/Dextrose/ Fat Emulsion 

Intravenous 1,920 ml @  80 mls/hr TPN  CONT IV  Last administered on 4/27/20at 

22:01;  Start 4/27/20 at 22:00;  Stop 4/28/20 at 21:59;  Status DC


Bumetanide (Bumex) 2 mg BID92 IV  Last administered on 5/1/20at 13:50;  Start 

4/28/20 at 14:00;  Stop 5/2/20 at 14:10;  Status DC


Meropenem 1 gm/ Sodium Chloride 100 ml @  200 mls/hr Q8HRS IV  Last administered

on 5/22/20at 05:53;  Start 4/28/20 at 14:00;  Stop 5/22/20 at 09:31;  Status DC


Potassium Acetate 55 meq/Magnesium Sulfate 20 meq/ Calcium Gluconate 10 meq/ 

Multivitamins 10 ml/Chromium/ Copper/Manganese/ Seleni/Zn 0.5 ml/ Insulin Human 

Regular 35 unit/ Total Parenteral Nutrition/Amino Acids/Dextrose/ Fat Emulsion 

Intravenous 1,920 ml @  80 mls/hr TPN  CONT IV  Last administered on 4/28/20at 

22:02;  Start 4/28/20 at 22:00;  Stop 4/29/20 at 21:59;  Status DC


Hydromorphone HCl (Dilaudid Standard PCA) 12 mg STK-MED ONCE IV ;  Start 4/27/20

at 14:35;  Stop 4/28/20 at 13:53;  Status DC


Artificial Tears (Artificial Tears) 1 drop PRN Q15MIN  PRN OU DRY EYE Last 

administered on 5/29/20at 10:08;  Start 4/29/20 at 05:30


Hydromorphone HCl (Dilaudid Standard PCA) 12 mg STK-MED ONCE IV ;  Start 4/28/20

at 12:05;  Stop 4/29/20 at 09:15;  Status DC


Potassium Acetate 65 meq/Magnesium Sulfate 20 meq/ Calcium Gluconate 10 meq/ 

Multivitamins 10 ml/Chromium/ Copper/Manganese/ Seleni/Zn 0.5 ml/ Insulin Human 

Regular 30 unit/ Total Parenteral Nutrition/Amino Acids/Dextrose/ Fat Emulsion 

Intravenous 1,920 ml @  80 mls/hr TPN  CONT IV  Last administered on 4/29/20at 

22:22;  Start 4/29/20 at 22:00;  Stop 4/30/20 at 21:59;  Status DC


Cyclobenzaprine HCl (Flexeril) 10 mg PRN Q6HRS  PRN PO MUSCLE SPASMS;  Start 

4/30/20 at 10:45


Potassium Acetate 55 meq/Magnesium Sulfate 20 meq/ Calcium Gluconate 10 meq/ M

ultivitamins 10 ml/Chromium/ Copper/Manganese/ Seleni/Zn 0.5 ml/ Insulin Human 

Regular 30 unit/ Total Parenteral Nutrition/Amino Acids/Dextrose/ Fat Emulsion 

Intravenous 1,920 ml @  80 mls/hr TPN  CONT IV  Last administered on 5/1/20at 

01:00;  Start 4/30/20 at 22:00;  Stop 5/1/20 at 21:59;  Status DC


Magnesium Sulfate 50 ml @ 25 mls/hr 1X  ONCE IV  Last administered on 4/30/20at 

17:18;  Start 4/30/20 at 12:45;  Stop 4/30/20 at 14:44;  Status DC


Potassium Chloride/Water 100 ml @  100 mls/hr 1X  ONCE IV  Last administered on 

5/1/20at 11:27;  Start 5/1/20 at 12:00;  Stop 5/1/20 at 12:59;  Status DC


Hydromorphone HCl (Dilaudid Standard PCA) 12 mg STK-MED ONCE IV ;  Start 4/29/20

at 10:50;  Stop 5/1/20 at 11:02;  Status DC


Hydromorphone HCl (Dilaudid Standard PCA) 12 mg STK-MED ONCE IV ;  Start 4/30/20

at 13:47;  Stop 5/1/20 at 11:03;  Status DC


Potassium Acetate 30 meq/Magnesium Sulfate 20 meq/ Calcium Gluconate 10 meq/ 

Multivitamins 10 ml/Chromium/ Copper/Manganese/ Seleni/Zn 0.5 ml/ Insulin Human 

Regular 30 unit/ Potassium Chloride 30 meq/ Total Parenteral Nutrition/Amino 

Acids/Dextrose/ Fat Emulsion Intravenous 1,920 ml @  80 mls/hr TPN  CONT IV  

Last administered on 5/1/20at 22:34;  Start 5/1/20 at 22:00;  Stop 5/2/20 at 

21:59;  Status DC


Potassium Chloride/Water 100 ml @  100 mls/hr Q1H IV  Last administered on 

5/2/20at 13:05;  Start 5/2/20 at 07:00;  Stop 5/2/20 at 10:59;  Status DC


Magnesium Sulfate 50 ml @ 25 mls/hr 1X  ONCE IV  Last administered on 5/2/20at 

10:34;  Start 5/2/20 at 10:30;  Stop 5/2/20 at 12:29;  Status DC


Potassium Chloride 75 meq/ Magnesium Sulfate 20 meq/Calcium Gluconate 10 meq/ 

Multivitamins 10 ml/Chromium/ Copper/Manganese/ Seleni/Zn 0.5 ml/ Insulin Human 

Regular 30 unit/ Total Parenteral Nutrition/Amino Acids/Dextrose/ Fat Emulsion 

Intravenous 1,920 ml @  80 mls/hr TPN  CONT IV  Last administered on 5/2/20at 

21:51;  Start 5/2/20 at 22:00;  Stop 5/3/20 at 22:00;  Status DC


Potassium Chloride 75 meq/ Magnesium Sulfate 20 meq/Calcium Gluconate 10 meq/ 

Multivitamins 10 ml/Chromium/ Copper/Manganese/ Seleni/Zn 0.5 ml/ Insulin Human 

Regular 25 unit/ Total Parenteral Nutrition/Amino Acids/Dextrose/ Fat Emulsion 

Intravenous 1,920 ml @  80 mls/hr TPN  CONT IV  Last administered on 5/3/20at 

22:04;  Start 5/3/20 at 22:00;  Stop 5/4/20 at 21:59;  Status DC


Hydromorphone HCl (Dilaudid) 0.4 mg PRN Q4HRS  PRN IVP PAIN Last administered on

5/4/20at 10:57;  Start 5/4/20 at 09:00;  Stop 5/4/20 at 18:59;  Status DC


Micafungin Sodium 100 mg/Dextrose 100 ml @  100 mls/hr Q24H IV  Last 

administered on 5/26/20at 12:17;  Start 5/4/20 at 11:00;  Stop 5/27/20 at 09:59;

 Status DC


Daptomycin 485 mg/ Sodium Chloride 50 ml @  100 mls/hr Q24H IV  Last 

administered on 5/11/20at 13:10;  Start 5/4/20 at 11:00;  Stop 5/12/20 at 07:44;

 Status DC


Potassium Chloride 75 meq/ Magnesium Sulfate 15 meq/Calcium Gluconate 8 meq/ 

Multivitamins 10 ml/Chromium/ Copper/Manganese/ Seleni/Zn 0.5 ml/ Insulin Human 

Regular 25 unit/ Total Parenteral Nutrition/Amino Acids/Dextrose/ Fat Emulsion 

Intravenous 1,920 ml @  80 mls/hr TPN  CONT IV  Last administered on 5/4/20at 

23:08;  Start 5/4/20 at 22:00;  Stop 5/5/20 at 21:59;  Status DC


Haloperidol Lactate (Haldol Inj) 3 mg 1X  ONCE IVP  Last administered on 

5/4/20at 14:37;  Start 5/4/20 at 14:30;  Stop 5/4/20 at 14:31;  Status DC


Hydromorphone HCl (Dilaudid) 1 mg PRN Q4HRS  PRN IVP PAIN Last administered on 

5/18/20at 06:25;  Start 5/4/20 at 19:00;  Stop 5/18/20 at 17:10;  Status DC


Potassium Chloride 75 meq/ Magnesium Sulfate 15 meq/Calcium Gluconate 8 meq/ 

Multivitamins 10 ml/Chromium/ Copper/Manganese/ Seleni/Zn 0.5 ml/ Insulin Human 

Regular 20 unit/ Total Parenteral Nutrition/Amino Acids/Dextrose/ Fat Emulsion 

Intravenous 1,920 ml @  80 mls/hr TPN  CONT IV  Last administered on 5/5/20at 

22:10;  Start 5/5/20 at 22:00;  Stop 5/6/20 at 21:59;  Status DC


Lidocaine HCl (Buffered Lidocaine 1%) 3 ml STK-MED ONCE .ROUTE ;  Start 5/6/20 

at 11:31;  Stop 5/6/20 at 11:31;  Status DC


Lidocaine HCl (Buffered Lidocaine 1%) 3 ml STK-MED ONCE .ROUTE ;  Start 5/6/20 

at 12:28;  Stop 5/6/20 at 12:29;  Status DC


Lidocaine HCl (Buffered Lidocaine 1%) 6 ml 1X  ONCE INJ  Last administered on 

5/6/20at 12:53;  Start 5/6/20 at 12:45;  Stop 5/6/20 at 12:46;  Status DC


Potassium Chloride 75 meq/ Magnesium Sulfate 15 meq/Calcium Gluconate 8 meq/ 

Multivitamins 10 ml/Chromium/ Copper/Manganese/ Seleni/Zn 0.5 ml/ Insulin Human 

Regular 20 unit/ Total Parenteral Nutrition/Amino Acids/Dextrose/ Fat Emulsion 

Intravenous 1,920 ml @  80 mls/hr TPN  CONT IV  Last administered on 5/6/20at 

22:00;  Start 5/6/20 at 22:00;  Stop 5/7/20 at 21:59;  Status DC


Potassium Chloride 75 meq/ Magnesium Sulfate 15 meq/Calcium Gluconate 8 meq/ 

Multivitamins 10 ml/Chromium/ Copper/Manganese/ Seleni/Zn 0.5 ml/ Insulin Human 

Regular 15 unit/ Total Parenteral Nutrition/Amino Acids/Dextrose/ Fat Emulsion 

Intravenous 1,920 ml @  80 mls/hr TPN  CONT IV  Last administered on 5/7/20at 

22:28;  Start 5/7/20 at 22:00;  Stop 5/8/20 at 21:59;  Status DC


Vecuronium Bromide (Norcuron Bolus) 6 mg PRN Q6HRS  PRN IV VENT ASYNCHRONY;  

Start 5/7/20 at 19:15;  Stop 5/7/20 at 19:35;  Status DC


Bumetanide (Bumex) 2 mg 1X  ONCE IV  Last administered on 5/7/20at 22:09;  Start

5/7/20 at 19:45;  Stop 5/7/20 at 19:46;  Status DC


Lidocaine HCl (Buffered Lidocaine 1%) 3 ml STK-MED ONCE .ROUTE ;  Start 5/8/20 

at 07:59;  Stop 5/8/20 at 07:59;  Status DC


Midazolam HCl (Versed) 5 mg STK-MED ONCE .ROUTE ;  Start 5/8/20 at 08:36;  Stop 

5/8/20 at 08:36;  Status DC


Fentanyl Citrate (Fentanyl 5ml Vial) 250 mcg STK-MED ONCE .ROUTE ;  Start 5/8/20

at 08:36;  Stop 5/8/20 at 08:37;  Status DC


Lidocaine HCl (Buffered Lidocaine 1%) 3 ml 1X  ONCE IJ  Last administered on 

5/8/20at 09:30;  Start 5/8/20 at 09:15;  Stop 5/8/20 at 09:16;  Status DC


Midazolam HCl (Versed) 5 mg 1X  ONCE IV  Last administered on 5/8/20at 09:30;  

Start 5/8/20 at 09:15;  Stop 5/8/20 at 09:16;  Status DC


Fentanyl Citrate (Fentanyl 5ml Vial) 250 mcg 1X  ONCE IV  Last administered on 

5/8/20at 09:30;  Start 5/8/20 at 09:15;  Stop 5/8/20 at 09:16;  Status DC


Bumetanide (Bumex) 2 mg DAILY IV  Last administered on 5/18/20at 08:07;  Start 

5/8/20 at 10:00;  Stop 5/18/20 at 17:15;  Status DC


Potassium Chloride 75 meq/ Magnesium Sulfate 15 meq/ Multivitamins 10 ml/C

hromium/ Copper/Manganese/ Seleni/Zn 0.5 ml/ Insulin Human Regular 15 unit/ 

Total Parenteral Nutrition/Amino Acids/Dextrose/ Fat Emulsion Intravenous 1,920 

ml @  80 mls/hr TPN  CONT IV  Last administered on 5/8/20at 21:59;  Start 5/8/20

at 22:00;  Stop 5/9/20 at 21:59;  Status DC


Metoclopramide HCl (Reglan Vial) 10 mg PRN Q3HRS  PRN IVP NAUSEA/VOMITING-3rd 

choice Last administered on 5/14/20at 04:25;  Start 5/9/20 at 16:45


Potassium Chloride 75 meq/ Magnesium Sulfate 15 meq/ Multivitamins 10 

ml/Chromium/ Copper/Manganese/ Seleni/Zn 0.5 ml/ Insulin Human Regular 15 unit/ 

Total Parenteral Nutrition/Amino Acids/Dextrose/ Fat Emulsion Intravenous 1,920 

ml @  80 mls/hr TPN  CONT IV  Last administered on 5/9/20at 22:41;  Start 5/9/20

at 22:00;  Stop 5/10/20 at 21:59;  Status DC


Magnesium Sulfate 50 ml @ 25 mls/hr 1X  ONCE IV  Last administered on 5/10/20at 

10:44;  Start 5/10/20 at 09:00;  Stop 5/10/20 at 10:59;  Status DC


Potassium Chloride/Water 100 ml @  100 mls/hr 1X  ONCE IV  Last administered on 

5/10/20at 09:37;  Start 5/10/20 at 09:00;  Stop 5/10/20 at 09:59;  Status DC


Duloxetine HCl (Cymbalta) 30 mg DAILY PO  Last administered on 5/11/20at 09:48; 

Start 5/10/20 at 14:00;  Stop 5/13/20 at 10:25;  Status DC


Potassium Chloride 80 meq/ Magnesium Sulfate 20 meq/ Multivitamins 10 

ml/Chromium/ Copper/Manganese/ Seleni/Zn 0.5 ml/ Insulin Human Regular 15 unit/ 

Total Parenteral Nutrition/Amino Acids/Dextrose/ Fat Emulsion Intravenous 1,920 

ml @  80 mls/hr TPN  CONT IV  Last administered on 5/10/20at 21:42;  Start 5/10

/20 at 22:00;  Stop 5/11/20 at 21:59;  Status DC


Potassium Chloride 80 meq/ Magnesium Sulfate 20 meq/ Multivitamins 10 ml/Chromi

um/ Copper/Manganese/ Seleni/Zn 0.5 ml/ Insulin Human Regular 15 unit/ Total 

Parenteral Nutrition/Amino Acids/Dextrose/ Fat Emulsion Intravenous 1,920 ml @  

80 mls/hr TPN  CONT IV  Last administered on 5/11/20at 22:20;  Start 5/11/20 at 

22:00;  Stop 5/12/20 at 21:59;  Status DC


Lidocaine HCl (Buffered Lidocaine 1%) 3 ml STK-MED ONCE .ROUTE ;  Start 5/12/20 

at 09:54;  Stop 5/12/20 at 09:55;  Status DC


Hydromorphone HCl (Dilaudid Standard PCA) 12 mg STK-MED ONCE IV ;  Start 5/1/20 

at 15:50;  Stop 5/12/20 at 11:24;  Status DC


Potassium Chloride 80 meq/ Magnesium Sulfate 20 meq/ Multivitamins 10 

ml/Chromium/ Copper/Manganese/ Seleni/Zn 0.5 ml/ Insulin Human Regular 15 unit/ 

Total Parenteral Nutrition/Amino Acids/Dextrose/ Fat Emulsion Intravenous 1,920 

ml @  80 mls/hr TPN  CONT IV  Last administered on 5/12/20at 21:40;  Start 

5/12/20 at 22:00;  Stop 5/13/20 at 21:59;  Status DC


Lidocaine HCl (Buffered Lidocaine 1%) 6 ml 1X  ONCE INJ  Last administered on 

5/12/20at 14:15;  Start 5/12/20 at 14:15;  Stop 5/12/20 at 14:16;  Status DC


Potassium Chloride 80 meq/ Magnesium Sulfate 20 meq/ Multivitamins 10 

ml/Chromium/ Copper/Manganese/ Seleni/Zn 1 ml/ Insulin Human Regular 15 unit/ 

Total Parenteral Nutrition/Amino Acids/Dextrose/ Fat Emulsion Intravenous 1,920 

ml @  80 mls/hr TPN  CONT IV  Last administered on 5/13/20at 22:04;  Start 

5/13/20 at 22:00;  Stop 5/14/20 at 21:59;  Status DC


Potassium Chloride/Water 100 ml @  100 mls/hr 1X  ONCE IV  Last administered on 

5/14/20at 11:34;  Start 5/14/20 at 11:00;  Stop 5/14/20 at 11:59;  Status DC


Potassium Chloride 90 meq/ Magnesium Sulfate 20 meq/ Multivitamins 10 

ml/Chromium/ Copper/Manganese/ Seleni/Zn 1 ml/ Insulin Human Regular 15 unit/ 

Total Parenteral Nutrition/Amino Acids/Dextrose/ Fat Emulsion Intravenous 1,920 

ml @  80 mls/hr TPN  CONT IV  Last administered on 5/14/20at 22:57;  Start 

5/14/20 at 22:00;  Stop 5/15/20 at 21:59;  Status DC


Potassium Chloride 90 meq/ Magnesium Sulfate 20 meq/ Multivitamins 10 

ml/Chromium/ Copper/Manganese/ Seleni/Zn 1 ml/ Insulin Human Regular 15 unit/ 

Total Parenteral Nutrition/Amino Acids/Dextrose/ Fat Emulsion Intravenous 1,920 

ml @  80 mls/hr TPN  CONT IV  Last administered on 5/15/20at 22:48;  Start 

5/15/20 at 22:00;  Stop 5/16/20 at 21:59;  Status DC


Potassium Chloride 90 meq/ Magnesium Sulfate 20 meq/ Multivitamins 10 

ml/Chromium/ Copper/Manganese/ Seleni/Zn 1 ml/ Insulin Human Regular 15 unit/ 

Total Parenteral Nutrition/Amino Acids/Dextrose/ Fat Emulsion Intravenous 1,890 

ml @  78.75 mls/ hr TPN  CONT IV  Last administered on 5/16/20at 22:15;  Start 

5/16/20 at 22:00;  Stop 5/17/20 at 21:59;  Status DC


Linezolid/Dextrose 300 ml @  300 mls/hr Q12HR IV  Last administered on 5/19/20at

21:08;  Start 5/17/20 at 09:00;  Stop 5/20/20 at 08:11;  Status DC


Daptomycin 450 mg/ Sodium Chloride 50 ml @  100 mls/hr Q24H IV  Last 

administered on 5/20/20at 09:25;  Start 5/17/20 at 09:00;  Stop 5/21/20 at 

08:30;  Status DC


Potassium Chloride 90 meq/ Magnesium Sulfate 20 meq/ Multivitamins 10 

ml/Chromium/ Copper/Manganese/ Seleni/Zn 1 ml/ Insulin Human Regular 15 unit/ 

Total Parenteral Nutrition/Amino Acids/Dextrose/ Fat Emulsion Intravenous 1,890 

ml @  78.75 mls/ hr TPN  CONT IV  Last administered on 5/17/20at 21:34;  Start 

5/17/20 at 22:00;  Stop 5/18/20 at 21:59;  Status DC


Lorazepam (Ativan Inj) 2 mg STK-MED ONCE .ROUTE ;  Start 5/17/20 at 14:58;  Stop

5/17/20 at 14:58;  Status DC


Metoprolol Tartrate (Lopressor Vial) 5 mg 1X  ONCE IVP  Last administered on 

5/17/20at 15:31;  Start 5/17/20 at 15:15;  Stop 5/17/20 at 15:16;  Status DC


Lorazepam (Ativan Inj) 2 mg 1X  ONCE IVP  Last administered on 5/17/20at 15:30; 

Start 5/17/20 at 15:15;  Stop 5/17/20 at 15:16;  Status DC


Enoxaparin Sodium (Lovenox 40mg Syringe) 40 mg Q24H SQ  Last administered on 

6/5/20at 17:44;  Start 5/17/20 at 17:00;  Stop 6/7/20 at 06:50;  Status DC


Lorazepam (Ativan Inj) 1 mg PRN Q4HRS  PRN IVP ANXIETY / AGITATION MILD-MOD Last

administered on 5/31/20at 15:55;  Start 5/17/20 at 19:15;  Stop 6/2/20 at 11:45;

 Status DC


Lorazepam (Ativan Inj) 2 mg PRN Q4HRS  PRN IVP ANXIETY / AGITATION SEVERE Last 

administered on 6/1/20at 07:55;  Start 5/17/20 at 19:15;  Stop 6/2/20 at 11:45; 

Status DC


Fentanyl Citrate (Fentanyl 2ml Vial) 50 mcg PRN Q4HRS  PRN IVP SEVERE PAIN Last 

administered on 6/8/20at 00:52;  Start 5/18/20 at 13:15


Fentanyl Citrate (Fentanyl 2ml Vial) 25 mcg PRN Q4HRS  PRN IVP MODERATE PAIN 

Last administered on 6/6/20at 21:55;  Start 5/18/20 at 13:15


Potassium Chloride 90 meq/ Magnesium Sulfate 20 meq/ Multivitamins 10 

ml/Chromium/ Copper/Manganese/ Seleni/Zn 1 ml/ Insulin Human Regular 15 unit/ 

Total Parenteral Nutrition/Amino Acids/Dextrose/ Fat Emulsion Intravenous 1,890 

ml @  78.75 mls/ hr TPN  CONT IV  Last administered on 5/18/20at 22:18;  Start 

5/18/20 at 22:00;  Stop 5/19/20 at 21:59;  Status DC


Furosemide (Lasix) 40 mg 1X  ONCE IVP  Last administered on 5/18/20at 21:51;  

Start 5/18/20 at 21:45;  Stop 5/18/20 at 21:48;  Status DC


Albumin Human 100 ml @  100 mls/hr 1X PRN  PRN IV SEE COMMENTS;  Start 5/19/20 

at 01:30


Furosemide (Lasix) 40 mg [id number] IVP  Last administered on 6/3/20at 08:04;  Start 

5/19/20 at 14:00;  Stop 6/3/20 at 13:07;  Status DC


Potassium Chloride 90 meq/ Magnesium Sulfate 20 meq/ Multivitamins 10 

ml/Chromium/ Copper/Manganese/ Seleni/Zn 1 ml/ Insulin Human Regular 15 unit/ 

Total Parenteral Nutrition/Amino Acids/Dextrose/ Fat Emulsion Intravenous 1,800 

ml @  75 mls/hr TPN  CONT IV  Last administered on 5/19/20at 22:31;  Start 

5/19/20 at 22:00;  Stop 5/20/20 at 21:59;  Status DC


Potassium Chloride 90 meq/ Magnesium Sulfate 20 meq/ Multivitamins 10 

ml/Chromium/ Copper/Manganese/ Seleni/Zn 1 ml/ Insulin Human Regular 15 unit/ 

Total Parenteral Nutrition/Amino Acids/Dextrose/ Fat Emulsion Intravenous 1,800 

ml @  75 mls/hr TPN  CONT IV  Last administered on 5/20/20at 22:28;  Start 

5/20/20 at 22:00;  Stop 5/21/20 at 21:59;  Status DC


Potassium Chloride 110 meq/ Magnesium Sulfate 20 meq/ Multivitamins 10 

ml/Chromium/ Copper/Manganese/ Seleni/Zn 1 ml/ Insulin Human Regular 15 unit/ 

Total Parenteral Nutrition/Amino Acids/Dextrose/ Fat Emulsion Intravenous 1,800 

ml @  75 mls/hr TPN  CONT IV  Last administered on 5/21/20at 22:01;  Start 

5/21/20 at 22:00;  Stop 5/22/20 at 21:59;  Status DC


Saliva Substitute (Biotene Moisturizing Mouth) 2 spray PRN Q15MIN  PRN PO DRY 

MOUTH;  Start 5/21/20 at 11:00


Potassium Chloride 110 meq/ Magnesium Sulfate 20 meq/ Multivitamins 10 

ml/Chromium/ Copper/Manganese/ Seleni/Zn 1 ml/ Insulin Human Regular 15 unit/ 

Total Parenteral Nutrition/Amino Acids/Dextrose/ Fat Emulsion Intravenous 1,800 

ml @  75 mls/hr TPN  CONT IV  Last administered on 5/22/20at 22:21;  Start 

5/22/20 at 22:00;  Stop 5/23/20 at 21:59;  Status DC


Potassium Chloride 110 meq/ Magnesium Sulfate 20 meq/ Multivitamins 10 

ml/Chromium/ Copper/Manganese/ Seleni/Zn 1 ml/ Insulin Human Regular 15 unit/ 

Total Parenteral Nutrition/Amino Acids/Dextrose/ Fat Emulsion Intravenous 1,800 

ml @  75 mls/hr TPN  CONT IV  Last administered on 5/23/20at 22:04;  Start 

5/23/20 at 22:00;  Stop 5/24/20 at 21:59;  Status DC


Potassium Chloride 110 meq/ Magnesium Sulfate 20 meq/ Multivitamins 10 

ml/Chromium/ Copper/Manganese/ Seleni/Zn 1 ml/ Insulin Human Regular 15 unit/ 

Total Parenteral Nutrition/Amino Acids/Dextrose/ Fat Emulsion Intravenous 1,800 

ml @  75 mls/hr TPN  CONT IV  Last administered on 5/24/20at 22:48;  Start 

5/24/20 at 22:00;  Stop 5/25/20 at 21:59;  Status DC


Potassium Chloride 70 meq/ Magnesium Sulfate 20 meq/ Multivitamins 10 

ml/Chromium/ Copper/Manganese/ Seleni/Zn 1 ml/ Insulin Human Regular 15 unit/ 

Total Parenteral Nutrition/Amino Acids/Dextrose/ Fat Emulsion Intravenous 1,800 

ml @  75 mls/hr TPN  CONT IV  Last administered on 5/25/20at 21:39;  Start 

5/25/20 at 22:00;  Stop 5/26/20 at 21:59;  Status DC


Meropenem 500 mg/ Sodium Chloride 50 ml @  100 mls/hr Q6HRS IV  Last 

administered on 5/27/20at 06:02;  Start 5/25/20 at 18:00;  Stop 5/27/20 at 

09:59;  Status DC


Barium Sulfate (Varibar Thin Liquid Apple) 148 gm 1X  ONCE PO ;  Start 5/26/20 

at 11:45;  Stop 5/26/20 at 11:49;  Status DC


Potassium Chloride 70 meq/ Magnesium Sulfate 20 meq/ Multivitamins 10 

ml/Chromium/ Copper/Manganese/ Seleni/Zn 1 ml/ Insulin Human Regular 15 unit/ 

Total Parenteral Nutrition/Amino Acids/Dextrose/ Fat Emulsion Intravenous 1,800 

ml @  75 mls/hr TPN  CONT IV  Last administered on 5/26/20at 22:27;  Start 

5/26/20 at 22:00;  Stop 5/27/20 at 21:59;  Status DC


Piperacillin Sod/ Tazobactam Sod 3.375 gm/Sodium Chloride 50 ml @  100 mls/hr 

Q6HRS IV  Last administered on 6/4/20at 06:10;  Start 5/27/20 at 12:00;  Stop 

6/4/20 at 07:26;  Status DC


Potassium Chloride 70 meq/ Magnesium Sulfate 20 meq/ Multivitamins 10 

ml/Chromium/ Copper/Manganese/ Seleni/Zn 1 ml/ Insulin Human Regular 15 unit/ 

Total Parenteral Nutrition/Amino Acids/Dextrose/ Fat Emulsion Intravenous 1,800 

ml @  75 mls/hr TPN  CONT IV  Last administered on 5/27/20at 22:03;  Start 

5/27/20 at 22:00;  Stop 5/28/20 at 21:59;  Status DC


Potassium Chloride 70 meq/ Magnesium Sulfate 20 meq/ Multivitamins 10 

ml/Chromium/ Copper/Manganese/ Seleni/Zn 1 ml/ Insulin Human Regular 15 unit/ 

Total Parenteral Nutrition/Amino Acids/Dextrose/ Fat Emulsion Intravenous 1,800 

ml @  75 mls/hr TPN  CONT IV  Last administered on 5/28/20at 22:33;  Start 

5/28/20 at 22:00;  Stop 5/29/20 at 21:59;  Status DC


Potassium Chloride 70 meq/ Magnesium Sulfate 20 meq/ Multivitamins 10 

ml/Chromium/ Copper/Manganese/ Seleni/Zn 1 ml/ Insulin Human Regular 15 unit/ 

Total Parenteral Nutrition/Amino Acids/Dextrose/ Fat Emulsion Intravenous 1,800 

ml @  75 mls/hr TPN  CONT IV  Last administered on 5/29/20at 23:13;  Start 

5/29/20 at 22:00;  Stop 5/30/20 at 21:59;  Status DC


Potassium Chloride 80 meq/ Magnesium Sulfate 20 meq/ Multivitamins 10 

ml/Chromium/ Copper/Manganese/ Seleni/Zn 1 ml/ Insulin Human Regular 15 unit/ 

Total Parenteral Nutrition/Amino Acids/Dextrose/ Fat Emulsion Intravenous 1,800 

ml @  75 mls/hr TPN  CONT IV  Last administered on 5/30/20at 22:30;  Start 

5/30/20 at 22:00;  Stop 5/31/20 at 21:59;  Status DC


Potassium Chloride 80 meq/ Magnesium Sulfate 20 meq/ Multivitamins 10 

ml/Chromium/ Copper/Manganese/ Seleni/Zn 1 ml/ Insulin Human Regular 15 unit/ 

Total Parenteral Nutrition/Amino Acids/Dextrose/ Fat Emulsion Intravenous 1,800 

ml @  75 mls/hr TPN  CONT IV  Last administered on 5/31/20at 21:54;  Start 

5/31/20 at 22:00;  Stop 6/1/20 at 21:59;  Status DC


Potassium Chloride/Water 100 ml @  100 mls/hr 1X  ONCE IV  Last administered on 

6/1/20at 10:15;  Start 6/1/20 at 10:00;  Stop 6/1/20 at 10:59;  Status DC


Potassium Chloride 90 meq/ Magnesium Sulfate 20 meq/ Multivitamins 10 ml/

Chromium/ Copper/Manganese/ Seleni/Zn 1 ml/ Insulin Human Regular 20 unit/ Total

Parenteral Nutrition/Amino Acids/Dextrose/ Fat Emulsion Intravenous 1,800 ml @  

75 mls/hr TPN  CONT IV  Last administered on 6/1/20at 22:28;  Start 6/1/20 at 

22:00;  Stop 6/2/20 at 21:59;  Status DC


Potassium Chloride 90 meq/ Magnesium Sulfate 20 meq/ Multivitamins 10 

ml/Chromium/ Copper/Manganese/ Seleni/Zn 1 ml/ Insulin Human Regular 20 unit/ 

Total Parenteral Nutrition/Amino Acids/Dextrose/ Fat Emulsion Intravenous 1,800 

ml @  75 mls/hr TPN  CONT IV  Last administered on 6/2/20at 22:08;  Start 6/2/20

at 22:00;  Stop 6/3/20 at 21:59;  Status DC


Lorazepam (Ativan Inj) 0.25 mg PRN Q4HRS  PRN IVP ANXIETY / AGITATION Last 

administered on 6/3/20at 07:55;  Start 6/3/20 at 07:30


Potassium Chloride 90 meq/ Magnesium Sulfate 20 meq/ Multivitamins 10 

ml/Chromium/ Copper/Manganese/ Seleni/Zn 1 ml/ Insulin Human Regular 20 unit/ 

Total Parenteral Nutrition/Amino Acids/Dextrose/ Fat Emulsion Intravenous 1,800 

ml @  75 mls/hr TPN  CONT IV  Last administered on 6/3/20at 23:13;  Start 6/3/20

at 22:00;  Stop 6/4/20 at 21:59;  Status DC


Furosemide (Lasix) 40 mg DAILY IVP  Last administered on 6/5/20at 11:14;  Start 

6/3/20 at 13:30;  Stop 6/7/20 at 09:12;  Status DC


Fluoxetine HCl (PROzac) 20 mg QHS PEG  Last administered on 6/6/20at 21:47;  

Start 6/4/20 at 21:00


Fentanyl (Duragesic 50mcg/ Hr Patch) 1 patch Q72H TD  Last administered on 

6/4/20at 21:22;  Start 6/4/20 at 21:00


Potassium Chloride 40 meq/ Potassium Acetate 60 meq/Magnesium Sulfate 10 meq/ 

Multivitamins 10 ml/Chromium/ Copper/Manganese/ Seleni/Zn 1 ml/ Insulin Human 

Regular 20 unit/ Total Parenteral Nutrition/Amino Acids/Dextrose/ Fat Emulsion 

Intravenous 1,800 ml @  75 mls/hr TPN  CONT IV  Last administered on 6/5/20at 

00:03;  Start 6/4/20 at 22:00;  Stop 6/5/20 at 21:59;  Status DC


Potassium Acetate 80 meq/Magnesium Sulfate 5 meq/ Multivitamins 10 ml/Chromium/ 

Copper/Manganese/ Seleni/Zn 1 ml/ Insulin Human Regular 20 unit/ Total 

Parenteral Nutrition/Amino Acids/Dextrose/ Fat Emulsion Intravenous 1,920 ml @  

80 mls/hr TPN  CONT IV  Last administered on 6/5/20at 21:59;  Start 6/5/20 at 

22:00;  Stop 6/6/20 at 21:59;  Status DC


Potassium Acetate 60 meq/Magnesium Sulfate 5 meq/ Multivitamins 10 ml/Chromium/ 

Copper/Manganese/ Seleni/Zn 1 ml/ Insulin Human Regular 30 unit/ Total 

Parenteral Nutrition/Amino Acids/Dextrose/ Fat Emulsion Intravenous 1,920 ml @  

80 mls/hr TPN  CONT IV  Last administered on 6/6/20at 21:54;  Start 6/6/20 at 

22:00;  Stop 6/7/20 at 21:59;  Status DC


Norepinephrine Bitartrate 8 mg/ Dextrose 258 ml @  13.332 mls/ hr CONT  PRN IV 

PER PROTOCOL Last administered on 6/7/20at 21:46;  Start 6/7/20 at 06:30


Albumin Human 500 ml @  125 mls/hr 1X  ONCE IV  Last administered on 6/7/20at 

08:10;  Start 6/7/20 at 08:15;  Stop 6/7/20 at 12:14;  Status DC


Potassium Acetate 40 meq/Magnesium Sulfate 5 meq/ Multivitamins 10 ml/Chromium/ 

Copper/Manganese/ Seleni/Zn 1 ml/ Insulin Human Regular 30 unit/ Total 

Parenteral Nutrition/Amino Acids/Dextrose/ Fat Emulsion Intravenous 1,920 ml @  

80 mls/hr TPN  CONT IV  Last administered on 6/7/20at 22:23;  Start 6/7/20 at 

22:00;  Stop 6/8/20 at 21:59


Meropenem 1 gm/ Sodium Chloride 100 ml @  200 mls/hr Q8HRS IV ;  Start 6/7/20 at

14:00;  Status Cancel


Meropenem 1 gm/ Sodium Chloride 100 ml @  200 mls/hr Q8HRS IV  Last administered

on 6/7/20at 11:04;  Start 6/7/20 at 10:00;  Stop 6/7/20 at 13:00;  Status DC


Meropenem 1 gm/ Sodium Chloride 100 ml @  200 mls/hr Q12HR IV  Last administered

on 6/7/20at 21:46;  Start 6/7/20 at 21:00


Sodium Chloride 1,000 ml @  1,000 mls/hr 1X  ONCE IV  Last administered on 

6/7/20at 11:06;  Start 6/7/20 at 10:45;  Stop 6/7/20 at 11:44;  Status DC


Micafungin Sodium 100 mg/Dextrose 100 ml @  100 mls/hr Q24H IV  Last 

administered on 6/7/20at 14:12;  Start 6/7/20 at 11:00


Daptomycin 410 mg/ Sodium Chloride 50 ml @  100 mls/hr Q24H IV  Last 

administered on 6/7/20at 14:14;  Start 6/7/20 at 14:00


Midazolam HCl (Versed) 2 mg STK-MED ONCE .ROUTE ;  Start 6/7/20 at 14:47;  Stop 

6/7/20 at 14:48;  Status DC


Fentanyl Citrate (Fentanyl 2ml Vial) 100 mcg STK-MED ONCE .ROUTE ;  Start 6/7/20

at 14:47;  Stop 6/7/20 at 14:48;  Status DC


Flumazenil (Romazicon) 0.5 mg STK-MED ONCE IV ;  Start 6/7/20 at 14:48;  Stop 

6/7/20 at 14:48;  Status DC


Naloxone HCl (Narcan) 0.4 mg STK-MED ONCE .ROUTE ;  Start 6/7/20 at 14:48;  Stop

6/7/20 at 14:48;  Status DC


Lidocaine HCl (Lidocaine 1% 20ml Vial) 20 ml STK-MED ONCE .ROUTE ;  Start 6/7/20

at 14:48;  Stop 6/7/20 at 14:48;  Status DC


Midazolam HCl (Versed) 2 mg 1X  ONCE IV  Last administered on 6/7/20at 15:28;  

Start 6/7/20 at 15:00;  Stop 6/7/20 at 15:01;  Status DC


Fentanyl Citrate (Fentanyl 2ml Vial) 100 mcg 1X  ONCE IV  Last administered on 

6/7/20at 15:28;  Start 6/7/20 at 15:00;  Stop 6/7/20 at 15:01;  Status DC


Lidocaine HCl (Lidocaine 1% 20ml Vial) 20 ml 1X  ONCE INJ  Last administered on 

6/7/20at 15:30;  Start 6/7/20 at 15:00;  Stop 6/7/20 at 15:01;  Status DC


Sodium Chloride 1,000 ml @  100 mls/hr Q10H IV  Last administered on 6/8/20at 

02:31;  Start 6/7/20 at 20:00


Sodium Bicarbonate (Sodium Bicarb Adult 8.4% Syr) 50 meq 1X  ONCE IV  Last 

administered on 6/7/20at 21:47;  Start 6/7/20 at 22:00;  Stop 6/7/20 at 22:01;  

Status DC





Active Scripts


Active


Reported


Bisoprolol Fumarate 5 Mg Tablet 10 Mg PO DAILY


Vitals/I & O





Vital Sign - Last 24 Hours








 6/7/20 6/7/20 6/7/20 6/7/20





 08:00 08:00 08:30 09:00


 


Temp   100.2 





   100.2 


 


Pulse 137  130 130


 


Resp 28 26 26


 


B/P (MAP) 81/55 (64)  98/54 (69) 95/60 (72)


 


Pulse Ox 100  100 100


 


O2 Delivery Venturi Mask Venturi Mask Venturi Mask Venturi Mask


 


O2 Flow Rate 15.0 15.0 15.0 15.0


 


    





    





 6/7/20 6/7/20 6/7/20 6/7/20





 09:48 10:00 10:05 10:20


 


Temp 100.2  100.2 100.2





 100.2  100.2 100.2


 


Pulse 150 130 150 148


 


Resp 28 26 28 28


 


B/P (MAP) 116/72 106/69 (81) 106/72 102/68


 


Pulse Ox  100  


 


O2 Delivery  Venturi Mask  


 


O2 Flow Rate  15.0  


 


    





    





 6/7/20 6/7/20 6/7/20 6/7/20





 10:35 11:00 11:33 12:00


 


Temp 100.3  100.2 





 100.3  100.2 


 


Pulse 148 133 148 


 


Resp 28 30 28 


 


B/P (MAP) 105/58 113/66 (82) 102/53 


 


Pulse Ox  100  


 


O2 Delivery  Venturi Mask  Venturi Mask


 


O2 Flow Rate  15.0  15.0


 


    





    





 6/7/20 6/7/20 6/7/20 6/7/20





 12:00 14:39 15:09 15:28


 


Temp 98.6   





 98.6   


 


Pulse 134   150


 


Resp 30 26


 


B/P (MAP) 119/62 (81)   


 


Pulse Ox 100 100 99 94


 


O2 Delivery Venturi Mask Venturi Mask Venturi Mask Venturi Mask


 


O2 Flow Rate 15.0 15.0 15.0 15.0


 


    





    





 6/7/20 6/7/20 6/7/20 6/7/20





 15:28 15:30 15:35 15:40


 


Pulse  150 148 148


 


Resp 25 26 26 28


 


B/P (MAP)  98/46 (63) 101/46 (64) 106/50 (68)


 


Pulse Ox  95 95 96


 


O2 Delivery  Venturi Mask Venturi Mask Venturi Mask


 


O2 Flow Rate  15.0 15.0 15.0





 6/7/20 6/7/20 6/7/20 6/7/20





 15:45 15:50 15:55 16:00


 


Pulse 146 147 146 146


 


Resp 28 28 28 28


 


B/P (MAP) 88/42 (57) 98/56 (70) 108/56 (73) 104/49 (67)


 


Pulse Ox 94 96 97 97


 


O2 Delivery Venturi Mask Venturi Mask Venturi Mask Venturi Mask


 


O2 Flow Rate 15.0 15.0 15.0 15.0





 6/7/20 6/7/20 6/7/20 6/7/20





 16:00 16:05 16:10 16:15


 


Pulse  147 147 147


 


Resp  28 28 28


 


B/P (MAP)  105/51 (69) 110/43 (65) 113/52 (72)


 


Pulse Ox  99 100 100


 


O2 Delivery Venturi Mask Venturi Mask Venturi Mask Venturi Mask


 


O2 Flow Rate 15.0 15.0 15.0 15.0





 6/7/20 6/7/20 6/7/20 6/7/20





 16:20 16:25 16:30 16:35


 


Pulse 146 145 145 144


 


Resp 28 28 26 26


 


B/P (MAP) 112/52 (72) 106/51 (69) 107/52 (70) 106/50 (68)


 


Pulse Ox 100 99 99 99


 


O2 Delivery Venturi Mask Venturi Mask Venturi Mask Venturi Mask


 


O2 Flow Rate 15.0 15.0 15.0 15.0





 6/7/20 6/7/20 6/7/20 6/7/20





 16:40 16:45 16:50 16:55


 


Pulse 144 145 143 143


 


Resp 24 26 25 25


 


B/P (MAP) 108/53 (71) 107/52 (70) 103/51 (68) 108/54 (72)


 


Pulse Ox 99 100 100 100


 


O2 Delivery Venturi Mask Venturi Mask Venturi Mask Venturi Mask


 


O2 Flow Rate 15.0 15.0 15.0 15.0





 6/7/20 6/7/20 6/7/20 6/7/20





 17:00 17:05 17:10 17:15


 


Pulse 141 141 140 139


 


Resp 24 24 25 25


 


B/P (MAP) 98/52 (67) 103/54 (70) 110/53 (72) 80/53 (62)


 


Pulse Ox 100 100 99 99


 


O2 Delivery Venturi Mask Venturi Mask Venturi Mask Venturi Mask


 


O2 Flow Rate 15.0 15.0 15.0 15.0





 6/7/20 6/7/20 6/7/20 6/7/20





 17:20 17:25 17:36 19:00


 


Pulse 138 136 136 126


 


Resp 25 26 26 22


 


B/P (MAP) 104/55 (71) 107/52 (70)  106/54 (71)


 


Pulse Ox 100 96 99 99


 


O2 Delivery Venturi Mask Venturi Mask Venturi Mask Venturi Mask


 


O2 Flow Rate 15.0 15.0 15.0 





 6/7/20 6/7/20 6/7/20 6/7/20





 19:32 20:00 20:00 21:00


 


Temp  98.8  





  98.8  


 


Pulse  124  114


 


Resp  18  18


 


B/P (MAP)  110/56 (74)  102/50 (67)





  109/70 (83)  


 


Pulse Ox 99 100  100


 


O2 Delivery AVAPS ON V60 BiPAP/CPAP Bi-pap BiPAP/CPAP


 


    





    





 6/7/20 6/7/20 6/8/20 6/8/20





 22:00 23:00 00:00 00:00


 


Temp   98.4 





   98.4 


 


Pulse 88 98 72 


 


Resp 18 18 20 


 


B/P (MAP) 152/74 (100) 140/74 (96) 166/80 (108) 





 136/77 (96)  164/89 (114) 


 


Pulse Ox 100 100 100 


 


O2 Delivery BiPAP/CPAP BiPAP/CPAP BiPAP/CPAP Bi-pap


 


    





    





 6/8/20 6/8/20 6/8/20 6/8/20





 00:16 00:52 01:00 02:00


 


Pulse   90 112


 


Resp  18 18 18


 


B/P (MAP)   138/72 (94) 128/54 (78)


 


Pulse Ox 99 100 100 100


 


O2 Delivery AVAPS ON V60 BiPAP/CPAP BiPAP/CPAP BiPAP/CPAP





 6/8/20 6/8/20 6/8/20 6/8/20





 02:10 03:00 04:00 04:00


 


Temp    98.8





    98.8


 


Pulse  100  93


 


Resp  18  18


 


B/P (MAP)  134/70 (91)  139/72 (94)


 


Pulse Ox 99 100  100


 


O2 Delivery AVAPS ON V60 BiPAP/CPAP Bi-pap BiPAP/CPAP


 


    





    





 6/8/20 6/8/20 6/8/20 6/8/20





 04:31 05:00 05:50 06:00


 


Pulse  90  96


 


Resp  18  18


 


B/P (MAP)  136/72 (93)  124/64 (84)


 


Pulse Ox 99 100 99 100


 


O2 Delivery AVAPS ON V60 BiPAP/CPAP AVAPS ON V60 BiPAP/CPAP














Intake and Output   


 


 6/7/20 6/7/20 6/8/20





 15:00 23:00 07:00


 


Intake Total 1035 ml 8163 ml 366 ml


 


Output Total 335 ml 945 ml 865 ml


 


Balance 700 ml 7218 ml -499 ml











Hemodynamically unstable?:  No


Is patient in severe pain?:  No


Is NPO status required?:  No











GAETANO GONCALVES MD         Jun 8, 2020 07:32

## 2020-06-08 NOTE — PDOC
PROGRESS NOTES


Assessment


Problems


Medical Problems:


(1) Acute pancreatitis


Status: Acute  





(2) Cholelithiasis


Status: Acute  





Single seizure on 3/6, no recurrence.


Metabolic encephalopathy.


Fevers.


Metabolic acidosis.


Diffuse pulmonary infiltrate.


Pleural effusion.


Pancreatitis, necrotizing.


Gallstone.


Leukocytosis.


Lymphopenia.


Electrolytes imbalances.


Hyperglycemia.


DM.


HTN.


HLD.


Anemia.


Abnormal CXR.


Obesity.


Ascites and pleural effusion


Ileus with vomiting


Anemia 


JED


Hyperkalemia


Metabolic acidosis


Hypertension


S/P trache


Anemia


S/P IR drain placement, 6/7


Hypotension, intermittently requiring Levophed


Plan


Keppra if she has further seizures.


Treat medical diseases.


Subjective


Indicates no pain


Objective





Vital Signs








  Date Time  Temp Pulse Resp B/P (MAP) Pulse Ox O2 Delivery O2 Flow Rate FiO2


 


6/8/20 08:40      AVAPS ON V60  


 


6/8/20 08:00 97.7 99 18 104/56 (72) 100   





 97.7       


 


6/7/20 17:36       15.0 














Intake and Output 


 


 6/8/20





 07:00


 


Intake Total 84246 ml


 


Output Total 2270 ml


 


Balance 8491 ml


 


 


 


Intake Oral 0 ml


 


IV Total 9666 ml


 


Blood Product IV Normal Saline Flush 1095 ml


 


Output Urine Total 1405 ml


 


Emesis 150 ml


 


Drainage Total 715 ml








PHYSICAL EXAM


Alert, follows commands. Tracheostomy shield. Back on vent


PERRL.


EOMI.


CN: no focal findings.


Muscle tone: normal.


Muscle strength: 3-4/5


DTR: 1+


Plantar reflex: Silent


Gait: not examined in bed.


Sensory exam: normal


Cerebellar: normal


Review of Relevant


I have reviewed the following items josy (where applicable) has been applied.


Labs





Laboratory Tests








Test


 6/6/20


13:06 6/6/20


13:53 6/6/20


16:30 6/6/20


18:12


 


White Blood Count


 16.9 x10^3/uL


(4.0-11.0) 


 


 





 


Red Blood Count


 2.48 x10^6/uL


(3.50-5.40) 


 


 





 


Hemoglobin


 7.2 g/dL


(12.0-15.5) 


 


 





 


Hematocrit


 22.1 %


(36.0-47.0) 


 


 





 


Mean Corpuscular Volume 89 fL ()    


 


Mean Corpuscular Hemoglobin 29 pg (25-35)    


 


Mean Corpuscular Hemoglobin


Concent 33 g/dL


(31-37) 


 


 





 


Red Cell Distribution Width


 19.5 %


(11.5-14.5) 


 


 





 


Platelet Count


 448 x10^3/uL


(140-400) 


 


 





 


Glucose (Fingerstick)


 


 216 mg/dL


(70-99) 


 199 mg/dL


(70-99)


 


O2 Saturation   82 % (92-99)  


 


Arterial Blood pH


 


 


 7.36


(7.35-7.45) 





 


Arterial Blood pCO2 at


Patient Temp 


 


 39 mmHg


(35-46) 





 


Arterial Blood pO2 at Patient


Temp 


 


 53 mmHg


() 





 


Arterial Blood HCO3


 


 


 22 mmol/L


(21-28) 





 


Arterial Blood Base Excess


 


 


 -4 mmol/L


(-3-3) 





 


FiO2   21  


 


Test


 6/6/20


18:24 6/7/20


00:27 6/7/20


05:15 6/7/20


06:50


 


White Blood Count


 18.7 x10^3/uL


(4.0-11.0) 


 20.0 x10^3/uL


(4.0-11.0) 





 


Red Blood Count


 3.07 x10^6/uL


(3.50-5.40) 


 3.06 x10^6/uL


(3.50-5.40) 





 


Hemoglobin


 9.1 g/dL


(12.0-15.5) 


 8.7 g/dL


(12.0-15.5) 





 


Hematocrit


 27.2 %


(36.0-47.0) 


 27.3 %


(36.0-47.0) 





 


Mean Corpuscular Volume 89 fL ()   89 fL ()  


 


Mean Corpuscular Hemoglobin 30 pg (25-35)   29 pg (25-35)  


 


Mean Corpuscular Hemoglobin


Concent 33 g/dL


(31-37) 


 32 g/dL


(31-37) 





 


Red Cell Distribution Width


 18.0 %


(11.5-14.5) 


 18.5 %


(11.5-14.5) 





 


Platelet Count


 476 x10^3/uL


(140-400) 


 451 x10^3/uL


(140-400) 





 


Glucose (Fingerstick)


 


 247 mg/dL


(70-99) 


 





 


Neutrophils (%) (Auto)   88 % (31-73)  


 


Lymphocytes (%) (Auto)   8 % (24-48)  


 


Monocytes (%) (Auto)   3 % (0-9)  


 


Eosinophils (%) (Auto)   0 % (0-3)  


 


Basophils (%) (Auto)   0 % (0-3)  


 


Neutrophils # (Auto)


 


 


 17.6 x10^3/uL


(1.8-7.7) 





 


Lymphocytes # (Auto)


 


 


 1.7 x10^3/uL


(1.0-4.8) 





 


Monocytes # (Auto)


 


 


 0.6 x10^3/uL


(0.0-1.1) 





 


Eosinophils # (Auto)


 


 


 0.0 x10^3/uL


(0.0-0.7) 





 


Basophils # (Auto)


 


 


 0.0 x10^3/uL


(0.0-0.2) 





 


Sodium Level


 


 


 147 mmol/L


(136-145) 





 


Potassium Level


 


 


 5.3 mmol/L


(3.5-5.1) 





 


Chloride Level


 


 


 113 mmol/L


() 





 


Carbon Dioxide Level


 


 


 25 mmol/L


(21-32) 





 


Anion Gap   9 (6-14)  


 


Blood Urea Nitrogen


 


 


 74 mg/dL


(7-20) 





 


Creatinine


 


 


 1.9 mg/dL


(0.6-1.0) 





 


Estimated GFR


(Cockcroft-Gault) 


 


 28.1 


 





 


BUN/Creatinine Ratio   39 (6-20)  


 


Glucose Level


 


 


 241 mg/dL


(70-99) 





 


Calcium Level


 


 


 10.8 mg/dL


(8.5-10.1) 





 


Total Bilirubin


 


 


 0.6 mg/dL


(0.2-1.0) 





 


Aspartate Amino Transf


(AST/SGOT) 


 


 27 U/L (15-37) 


 





 


Alanine Aminotransferase


(ALT/SGPT) 


 


 20 U/L (14-59) 


 





 


Alkaline Phosphatase


 


 


 104 U/L


() 





 


Total Protein


 


 


 6.3 g/dL


(6.4-8.2) 





 


Albumin


 


 


 1.8 g/dL


(3.4-5.0) 





 


Albumin/Globulin Ratio   0.4 (1.0-1.7)  


 


Prothrombin Time


 


 


 


 16.0 SEC


(11.7-14.0)


 


Prothromb Time International


Ratio 


 


 


 1.3 (0.8-1.1) 





 


Activated Partial


Thromboplast Time 


 


 


 32 SEC (24-38) 





 


Lactic Acid Level


 


 


 


 1.7 mmol/L


(0.4-2.0)


 


Test


 6/7/20


08:08 6/7/20


11:30 6/7/20


13:20 6/7/20


18:05


 


Hemoglobin


 8.3 g/dL


(12.0-15.5) 


 9.6 g/dL


(12.0-15.5) 





 


Hematocrit


 26.0 %


(36.0-47.0) 


 28.9 %


(36.0-47.0) 





 


Mean Corpuscular Hemoglobin


Concent 32 g/dL


(31-37) 


 33 g/dL


(31-37) 





 


Urine Collection Type  Unknown   


 


Urine Color  Yellow   


 


Urine Clarity  Cloudy   


 


Urine pH  5.0 (<5.0-8.0)   


 


Urine Specific Gravity


 


 1.020


(1.000-1.030) 


 





 


Urine Protein


 


 30 mg/dL


(NEG-TRACE) 


 





 


Urine Glucose (UA)


 


 Negative mg/dL


(NEG) 


 





 


Urine Ketones (Stick)


 


 Negative mg/dL


(NEG) 


 





 


Urine Blood  Negative (NEG)   


 


Urine Nitrite  Negative (NEG)   


 


Urine Bilirubin  Negative (NEG)   


 


Urine Urobilinogen Dipstick


 


 0.2 mg/dL (0.2


mg/dL) 


 





 


Urine Leukocyte Esterase  Small (NEG)   


 


Urine RBC  0 /HPF (0-2)   


 


Urine WBC


 


 11-20 /HPF


(0-4) 


 





 


Urine Squamous Epithelial


Cells 


 Mod /LPF 


 


 





 


Urine Transitional Epithelial


Cells 


 Mod /LPF 


 


 





 


Urine Amorphous Sediment  Present /HPF   


 


Urine Bacteria


 


 Few /HPF


(0-FEW) 


 





 


Urine Hyaline Casts  Many /HPF   


 


Urine Granular Casts  Many /HPF   


 


Urine Mucus  Marked /LPF   


 


Urine Yeast  Present /HPF   


 


White Blood Count


 


 


 20.9 x10^3/uL


(4.0-11.0) 





 


Red Blood Count


 


 


 3.23 x10^6/uL


(3.50-5.40) 





 


Mean Corpuscular Volume   89 fL ()  


 


Mean Corpuscular Hemoglobin   30 pg (25-35)  


 


Red Cell Distribution Width


 


 


 17.0 %


(11.5-14.5) 





 


Platelet Count


 


 


 433 x10^3/uL


(140-400) 





 


O2 Saturation    95 % (92-99) 


 


Arterial Blood pH


 


 


 


 7.25


(7.35-7.45)


 


Arterial Blood pCO2 at


Patient Temp 


 


 


 48 mmHg


(35-46)


 


Arterial Blood pO2 at Patient


Temp 


 


 


 92 mmHg


()


 


Arterial Blood HCO3


 


 


 


 21 mmol/L


(21-28)


 


Arterial Blood Base Excess


 


 


 


 -6 mmol/L


(-3-3)


 


FiO2    50 


 


Test


 6/7/20


18:30 6/7/20


18:37 6/7/20


20:45 6/8/20


00:30


 


White Blood Count


 21.2 x10^3/uL


(4.0-11.0) 


 


 19.8 x10^3/uL


(4.0-11.0)


 


Red Blood Count


 2.51 x10^6/uL


(3.50-5.40) 


 


 3.14 x10^6/uL


(3.50-5.40)


 


Hemoglobin


 7.4 g/dL


(12.0-15.5) 


 


 9.4 g/dL


(12.0-15.5)


 


Hematocrit


 22.6 %


(36.0-47.0) 


 


 28.0 %


(36.0-47.0)


 


Mean Corpuscular Volume 90 fL ()    89 fL () 


 


Mean Corpuscular Hemoglobin 30 pg (25-35)    30 pg (25-35) 


 


Mean Corpuscular Hemoglobin


Concent 33 g/dL


(31-37) 


 


 34 g/dL


(31-37)


 


Red Cell Distribution Width


 17.3 %


(11.5-14.5) 


 


 16.8 %


(11.5-14.5)


 


Platelet Count


 310 x10^3/uL


(140-400) 


 


 325 x10^3/uL


(140-400)


 


Glucose (Fingerstick)


 


 234 mg/dL


(70-99) 


 





 


O2 Saturation   97 % (92-99)  


 


Arterial Blood pH


 


 


 7.27


(7.35-7.45) 





 


Arterial Blood pCO2 at


Patient Temp 


 


 44 mmHg


(35-46) 





 


Arterial Blood pO2 at Patient


Temp 


 


 108 mmHg


() 





 


Arterial Blood HCO3


 


 


 20 mmol/L


(21-28) 





 


Arterial Blood Base Excess


 


 


 -7 mmol/L


(-3-3) 





 


FiO2   40  


 


Test


 6/8/20


00:45 6/8/20


06:50 6/8/20


06:59 6/8/20


08:15


 


Glucose (Fingerstick)


 320 mg/dL


(70-99) 


 149 mg/dL


(70-99) 





 


White Blood Count


 


 13.8 x10^3/uL


(4.0-11.0) 


 





 


Red Blood Count


 


 3.03 x10^6/uL


(3.50-5.40) 


 





 


Hemoglobin


 


 8.8 g/dL


(12.0-15.5) 


 





 


Hematocrit


 


 26.9 %


(36.0-47.0) 


 





 


Mean Corpuscular Volume  89 fL ()   


 


Mean Corpuscular Hemoglobin  29 pg (25-35)   


 


Mean Corpuscular Hemoglobin


Concent 


 33 g/dL


(31-37) 


 





 


Red Cell Distribution Width


 


 17.2 %


(11.5-14.5) 


 





 


Platelet Count


 


 311 x10^3/uL


(140-400) 


 





 


Neutrophils (%) (Auto)  88 % (31-73)   


 


Lymphocytes (%) (Auto)  7 % (24-48)   


 


Monocytes (%) (Auto)  4 % (0-9)   


 


Eosinophils (%) (Auto)  1 % (0-3)   


 


Basophils (%) (Auto)  0 % (0-3)   


 


Neutrophils # (Auto)


 


 12.1 x10^3/uL


(1.8-7.7) 


 





 


Lymphocytes # (Auto)


 


 0.9 x10^3/uL


(1.0-4.8) 


 





 


Monocytes # (Auto)


 


 0.6 x10^3/uL


(0.0-1.1) 


 





 


Eosinophils # (Auto)


 


 0.1 x10^3/uL


(0.0-0.7) 


 





 


Basophils # (Auto)


 


 0.0 x10^3/uL


(0.0-0.2) 


 





 


Sodium Level


 


 151 mmol/L


(136-145) 


 





 


Potassium Level


 


 4.1 mmol/L


(3.5-5.1) 


 





 


Chloride Level


 


 118 mmol/L


() 


 





 


Carbon Dioxide Level


 


 25 mmol/L


(21-32) 


 





 


Anion Gap  8 (6-14)   


 


Blood Urea Nitrogen


 


 58 mg/dL


(7-20) 


 





 


Creatinine


 


 1.3 mg/dL


(0.6-1.0) 


 





 


Estimated GFR


(Cockcroft-Gault) 


 43.5 


 


 





 


BUN/Creatinine Ratio  45 (6-20)   


 


Glucose Level


 


 165 mg/dL


(70-99) 


 





 


Calcium Level


 


 9.9 mg/dL


(8.5-10.1) 


 





 


Total Bilirubin


 


 0.5 mg/dL


(0.2-1.0) 


 





 


Aspartate Amino Transf


(AST/SGOT) 


 15 U/L (15-37) 


 


 





 


Alanine Aminotransferase


(ALT/SGPT) 


 12 U/L (14-59) 


 


 





 


Alkaline Phosphatase


 


 95 U/L


() 


 





 


Total Protein


 


 5.3 g/dL


(6.4-8.2) 


 





 


Albumin


 


 1.5 g/dL


(3.4-5.0) 


 





 


Albumin/Globulin Ratio  0.4 (1.0-1.7)   


 


O2 Saturation    98 % (92-99) 


 


Arterial Blood pH


 


 


 


 7.34


(7.35-7.45)


 


Arterial Blood pCO2 at


Patient Temp 


 


 


 40 mmHg


(35-46)


 


Arterial Blood pO2 at Patient


Temp 


 


 


 126 mmHg


()


 


Arterial Blood HCO3


 


 


 


 21 mmol/L


(21-28)


 


Arterial Blood Base Excess


 


 


 


 -5 mmol/L


(-3-3)


 


FiO2    40% bipap 








Laboratory Tests








Test


 6/7/20


11:30 6/7/20


13:20 6/7/20


18:05 6/7/20


18:30


 


Urine Collection Type Unknown    


 


Urine Color Yellow    


 


Urine Clarity Cloudy    


 


Urine pH 5.0 (<5.0-8.0)    


 


Urine Specific Gravity


 1.020


(1.000-1.030) 


 


 





 


Urine Protein


 30 mg/dL


(NEG-TRACE) 


 


 





 


Urine Glucose (UA)


 Negative mg/dL


(NEG) 


 


 





 


Urine Ketones (Stick)


 Negative mg/dL


(NEG) 


 


 





 


Urine Blood Negative (NEG)    


 


Urine Nitrite Negative (NEG)    


 


Urine Bilirubin Negative (NEG)    


 


Urine Urobilinogen Dipstick


 0.2 mg/dL (0.2


mg/dL) 


 


 





 


Urine Leukocyte Esterase Small (NEG)    


 


Urine RBC 0 /HPF (0-2)    


 


Urine WBC


 11-20 /HPF


(0-4) 


 


 





 


Urine Squamous Epithelial


Cells Mod /LPF 


 


 


 





 


Urine Transitional Epithelial


Cells Mod /LPF 


 


 


 





 


Urine Amorphous Sediment Present /HPF    


 


Urine Bacteria


 Few /HPF


(0-FEW) 


 


 





 


Urine Hyaline Casts Many /HPF    


 


Urine Granular Casts Many /HPF    


 


Urine Mucus Marked /LPF    


 


Urine Yeast Present /HPF    


 


White Blood Count


 


 20.9 x10^3/uL


(4.0-11.0) 


 21.2 x10^3/uL


(4.0-11.0)


 


Red Blood Count


 


 3.23 x10^6/uL


(3.50-5.40) 


 2.51 x10^6/uL


(3.50-5.40)


 


Hemoglobin


 


 9.6 g/dL


(12.0-15.5) 


 7.4 g/dL


(12.0-15.5)


 


Hematocrit


 


 28.9 %


(36.0-47.0) 


 22.6 %


(36.0-47.0)


 


Mean Corpuscular Volume  89 fL ()   90 fL () 


 


Mean Corpuscular Hemoglobin  30 pg (25-35)   30 pg (25-35) 


 


Mean Corpuscular Hemoglobin


Concent 


 33 g/dL


(31-37) 


 33 g/dL


(31-37)


 


Red Cell Distribution Width


 


 17.0 %


(11.5-14.5) 


 17.3 %


(11.5-14.5)


 


Platelet Count


 


 433 x10^3/uL


(140-400) 


 310 x10^3/uL


(140-400)


 


O2 Saturation   95 % (92-99)  


 


Arterial Blood pH


 


 


 7.25


(7.35-7.45) 





 


Arterial Blood pCO2 at


Patient Temp 


 


 48 mmHg


(35-46) 





 


Arterial Blood pO2 at Patient


Temp 


 


 92 mmHg


() 





 


Arterial Blood HCO3


 


 


 21 mmol/L


(21-28) 





 


Arterial Blood Base Excess


 


 


 -6 mmol/L


(-3-3) 





 


FiO2   50  


 


Test


 6/7/20


18:37 6/7/20


20:45 6/8/20


00:30 6/8/20


00:45


 


Glucose (Fingerstick)


 234 mg/dL


(70-99) 


 


 320 mg/dL


(70-99)


 


O2 Saturation  97 % (92-99)   


 


Arterial Blood pH


 


 7.27


(7.35-7.45) 


 





 


Arterial Blood pCO2 at


Patient Temp 


 44 mmHg


(35-46) 


 





 


Arterial Blood pO2 at Patient


Temp 


 108 mmHg


() 


 





 


Arterial Blood HCO3


 


 20 mmol/L


(21-28) 


 





 


Arterial Blood Base Excess


 


 -7 mmol/L


(-3-3) 


 





 


FiO2  40   


 


White Blood Count


 


 


 19.8 x10^3/uL


(4.0-11.0) 





 


Red Blood Count


 


 


 3.14 x10^6/uL


(3.50-5.40) 





 


Hemoglobin


 


 


 9.4 g/dL


(12.0-15.5) 





 


Hematocrit


 


 


 28.0 %


(36.0-47.0) 





 


Mean Corpuscular Volume   89 fL ()  


 


Mean Corpuscular Hemoglobin   30 pg (25-35)  


 


Mean Corpuscular Hemoglobin


Concent 


 


 34 g/dL


(31-37) 





 


Red Cell Distribution Width


 


 


 16.8 %


(11.5-14.5) 





 


Platelet Count


 


 


 325 x10^3/uL


(140-400) 





 


Test


 6/8/20


06:50 6/8/20


06:59 6/8/20


08:15 





 


White Blood Count


 13.8 x10^3/uL


(4.0-11.0) 


 


 





 


Red Blood Count


 3.03 x10^6/uL


(3.50-5.40) 


 


 





 


Hemoglobin


 8.8 g/dL


(12.0-15.5) 


 


 





 


Hematocrit


 26.9 %


(36.0-47.0) 


 


 





 


Mean Corpuscular Volume 89 fL ()    


 


Mean Corpuscular Hemoglobin 29 pg (25-35)    


 


Mean Corpuscular Hemoglobin


Concent 33 g/dL


(31-37) 


 


 





 


Red Cell Distribution Width


 17.2 %


(11.5-14.5) 


 


 





 


Platelet Count


 311 x10^3/uL


(140-400) 


 


 





 


Neutrophils (%) (Auto) 88 % (31-73)    


 


Lymphocytes (%) (Auto) 7 % (24-48)    


 


Monocytes (%) (Auto) 4 % (0-9)    


 


Eosinophils (%) (Auto) 1 % (0-3)    


 


Basophils (%) (Auto) 0 % (0-3)    


 


Neutrophils # (Auto)


 12.1 x10^3/uL


(1.8-7.7) 


 


 





 


Lymphocytes # (Auto)


 0.9 x10^3/uL


(1.0-4.8) 


 


 





 


Monocytes # (Auto)


 0.6 x10^3/uL


(0.0-1.1) 


 


 





 


Eosinophils # (Auto)


 0.1 x10^3/uL


(0.0-0.7) 


 


 





 


Basophils # (Auto)


 0.0 x10^3/uL


(0.0-0.2) 


 


 





 


Sodium Level


 151 mmol/L


(136-145) 


 


 





 


Potassium Level


 4.1 mmol/L


(3.5-5.1) 


 


 





 


Chloride Level


 118 mmol/L


() 


 


 





 


Carbon Dioxide Level


 25 mmol/L


(21-32) 


 


 





 


Anion Gap 8 (6-14)    


 


Blood Urea Nitrogen


 58 mg/dL


(7-20) 


 


 





 


Creatinine


 1.3 mg/dL


(0.6-1.0) 


 


 





 


Estimated GFR


(Cockcroft-Gault) 43.5 


 


 


 





 


BUN/Creatinine Ratio 45 (6-20)    


 


Glucose Level


 165 mg/dL


(70-99) 


 


 





 


Calcium Level


 9.9 mg/dL


(8.5-10.1) 


 


 





 


Total Bilirubin


 0.5 mg/dL


(0.2-1.0) 


 


 





 


Aspartate Amino Transf


(AST/SGOT) 15 U/L (15-37) 


 


 


 





 


Alanine Aminotransferase


(ALT/SGPT) 12 U/L (14-59) 


 


 


 





 


Alkaline Phosphatase


 95 U/L


() 


 


 





 


Total Protein


 5.3 g/dL


(6.4-8.2) 


 


 





 


Albumin


 1.5 g/dL


(3.4-5.0) 


 


 





 


Albumin/Globulin Ratio 0.4 (1.0-1.7)    


 


Glucose (Fingerstick)


 


 149 mg/dL


(70-99) 


 





 


O2 Saturation   98 % (92-99)  


 


Arterial Blood pH


 


 


 7.34


(7.35-7.45) 





 


Arterial Blood pCO2 at


Patient Temp 


 


 40 mmHg


(35-46) 





 


Arterial Blood pO2 at Patient


Temp 


 


 126 mmHg


() 





 


Arterial Blood HCO3


 


 


 21 mmol/L


(21-28) 





 


Arterial Blood Base Excess


 


 


 -5 mmol/L


(-3-3) 





 


FiO2   40% bipap  








Microbiology


5/30/20 Gram Stain - Final, Complete


          


5/30/20 Aerobic Culture - Final, Complete


          


5/17/20 Blood Culture - Final, Complete


          NO GROWTH AFTER 5 DAYS


5/6/20 Fungal Culture - Final, Complete


         


5/6/20 Fungal Culture Result 1 - Final, Complete


         


4/12/20 Urine Culture - Final, Complete


          


4/12/20 Urine Culture Result 1 ([facility]) - Final, Complete


Medications





Current Medications


Sodium Chloride 1,000 ml @  1,000 mls/hr Q1H IV  Last administered on 3/16/20at 

03:00;  Start 3/16/20 at 03:00;  Stop 3/16/20 at 03:59;  Status DC


Ondansetron HCl (Zofran) 4 mg 1X  ONCE IVP  Last administered on 3/16/20at 

03:27;  Start 3/16/20 at 03:00;  Stop 3/16/20 at 03:01;  Status DC


Morphine Sulfate (Morphine Sulfate) 4 mg 1X  ONCE IV ;  Start 3/16/20 at 03:00; 

Stop 3/16/20 at 03:01;  Status Cancel


Ketorolac Tromethamine (Toradol 30mg Vial) 30 mg 1X  ONCE IV  Last administered 

on 3/16/20at 02:54;  Start 3/16/20 at 03:00;  Stop 3/16/20 at 03:01;  Status DC


Fentanyl Citrate (Fentanyl 2ml Vial) 25 mcg 1X  ONCE IVP  Last administered on 

3/16/20at 03:23;  Start 3/16/20 at 03:30;  Stop 3/16/20 at 03:31;  Status DC


Fentanyl Citrate (Fentanyl 2ml Vial) 100 mcg STK-MED ONCE .ROUTE ;  Start 

3/16/20 at 03:18;  Stop 3/16/20 at 03:18;  Status DC


Iohexol (Omnipaque 350 Mg/ml) 90 ml 1X  ONCE IV  Last administered on 3/16/20at 

03:25;  Start 3/16/20 at 03:30;  Stop 3/16/20 at 03:31;  Status DC


Info (CONTRAST GIVEN -- Rx MONITORING) 1 each PRN DAILY  PRN MC SEE COMMENTS;  

Start 3/16/20 at 03:30;  Stop 3/18/20 at 03:29;  Status DC


Hydromorphone HCl (Dilaudid) 0.5 mg 1X  ONCE IV  Last administered on 3/16/20at 

03:55;  Start 3/16/20 at 04:30;  Stop 3/16/20 at 04:32;  Status DC


Ondansetron HCl (Zofran) 4 mg PRN Q8HRS  PRN IV NAUSEA/VOMITING 1ST CHOICE;  

Start 3/16/20 at 05:00;  Stop 3/16/20 at 09:27;  Status DC


Morphine Sulfate (Morphine Sulfate) 2 mg PRN Q2HR  PRN IV SEVERE PAIN 7-10 Last 

administered on 3/17/20at 12:26;  Start 3/16/20 at 05:00;  Stop 3/17/20 at 

14:15;  Status DC


Sodium Chloride 1,000 ml @  125 mls/hr Q8H IV  Last administered on 3/16/20at 

20:56;  Start 3/16/20 at 05:00;  Stop 3/17/20 at 04:59;  Status DC


Hydromorphone HCl (Dilaudid) 0.5 mg PRN Q3HRS  PRN IV SEVERE PAIN 7-10 Last 

administered on 3/17/20at 10:06;  Start 3/16/20 at 05:00;  Stop 3/17/20 at 

12:01;  Status DC


Piperacillin Sod/ Tazobactam Sod 4.5 gm/Sodium Chloride 100 ml @  200 mls/hr 1X 

ONCE IV  Last administered on 3/16/20at 05:44;  Start 3/16/20 at 06:00;  Stop 3

/16/20 at 06:29;  Status DC


Ondansetron HCl (Zofran) 4 mg PRN Q4HRS  PRN IV NAUSEA/VOMITING 1ST CHOICE Last 

administered on 6/7/20at 05:18;  Start 3/16/20 at 09:30


Insulin Human Lispro (HumaLOG) 0-9 UNITS Q6HRS SQ  Last administered on 6/8/20at

00:50;  Start 3/16/20 at 09:30


Dextrose (Dextrose 50%-Water Syringe) 12.5 gm PRN Q15MIN  PRN IV SEE COMMENTS;  

Start 3/16/20 at 09:30


Pantoprazole Sodium (PROTONIX VIAL for IV PUSH) 40 mg DAILYAC IVP  Last 

administered on 6/8/20at 07:46;  Start 3/16/20 at 11:30


Prochlorperazine Edisylate (Compazine) 10 mg PRN Q6HRS  PRN IV NAUSEA/VOMITING, 

2nd CHOICE Last administered on 6/7/20at 11:00;  Start 3/16/20 at 17:45


Atenolol (Tenormin) 100 mg DAILY PO ;  Start 3/17/20 at 09:00;  Stop 3/16/20 at 

20:08;  Status DC


Metoprolol Tartrate (Lopressor Vial) 2.5 mg Q6HRS IVP  Last administered on 

3/17/20at 05:51;  Start 3/16/20 at 20:15;  Stop 3/17/20 at 10:02;  Status DC


Metoprolol Tartrate (Lopressor Vial) 5 mg Q6HRS IVP  Last administered on 

3/26/20at 00:12;  Start 3/17/20 at 10:15;  Stop 3/28/20 at 08:48;  Status DC


Hydromorphone HCl (Dilaudid) 1 mg PRN Q3HRS  PRN IV SEVERE PAIN 7-10 Last 

administered on 3/23/20at 05:13;  Start 3/17/20 at 12:00;  Stop 3/31/20 at 

00:25;  Status DC


Lidocaine HCl (Buffered Lidocaine 1%) 3 ml STK-MED ONCE .ROUTE ;  Start 3/17/20 

at 12:55;  Stop 3/17/20 at 12:56;  Status DC


Albumin Human 500 ml @  125 mls/hr 1X  ONCE IV  Last administered on 3/17/20at 

14:33;  Start 3/17/20 at 14:30;  Stop 3/17/20 at 18:32;  Status DC


Norepinephrine Bitartrate 8 mg/ Dextrose 258 ml @  17.299 mls/ hr CONT  PRN IV 

PER PROTOCOL Last administered on 4/14/20at 12:48;  Start 3/17/20 at 15:30;  

Stop 4/17/20 at 09:19;  Status DC


Sodium Chloride 1,000 ml @  125 mls/hr Q8H IV  Last administered on 3/17/20at 

21:04;  Start 3/17/20 at 16:00;  Stop 3/18/20 at 02:42;  Status DC


Albumin Human 500 ml @  125 mls/hr PRN BID  PRN IV After every 2L NSS & BP < 

90mm Last administered on 6/6/20at 11:40;  Start 3/17/20 at 16:00


Iohexol (Omnipaque 300 Mg/ml) 60 ml 1X  ONCE IV  Last administered on 3/17/20at 

17:20;  Start 3/17/20 at 17:00;  Stop 3/17/20 at 17:01;  Status DC


Info (CONTRAST GIVEN -- Rx MONITORING) 1 each PRN DAILY  PRN MC SEE COMMENTS;  

Start 3/17/20 at 17:00;  Stop 3/19/20 at 16:59;  Status DC


Meropenem 1 gm/ Sodium Chloride 100 ml @  200 mls/hr Q8HRS IV  Last administered

on 3/18/20at 05:45;  Start 3/17/20 at 20:00;  Stop 3/18/20 at 08:48;  Status DC


Furosemide (Lasix) 40 mg 1X  ONCE IVP  Last administered on 3/17/20at 22:12;  

Start 3/17/20 at 22:30;  Stop 3/17/20 at 22:31;  Status DC


Calcium Chloride 1000 mg/Sodium Chloride 110 ml @  220 mls/hr 1X  ONCE IV  Last 

administered on 3/17/20at 22:11;  Start 3/17/20 at 22:30;  Stop 3/17/20 at 

22:59;  Status DC


Albuterol Sulfate (Ventolin Neb Soln) 2.5 mg 1X  ONCE NEB  Last administered on 

3/18/20at 00:56;  Start 3/17/20 at 22:30;  Stop 3/17/20 at 22:31;  Status DC


Insulin Human Regular (HumuLIN R VIAL) 5 unit 1X  ONCE IV  Last administered on 

3/17/20at 22:14;  Start 3/17/20 at 22:30;  Stop 3/17/20 at 22:31;  Status DC


Magnesium Sulfate 50 ml @ 25 mls/hr 1X  ONCE IV  Last administered on 3/18/20at 

02:57;  Start 3/18/20 at 03:00;  Stop 3/18/20 at 04:59;  Status DC


Calcium Gluconate 1000 mg/Sodium Chloride 110 ml @  220 mls/hr 1X  ONCE IV  Last

administered on 3/18/20at 02:46;  Start 3/18/20 at 03:00;  Stop 3/18/20 at 

03:29;  Status DC


Sodium Chloride 1,000 ml @  200 mls/hr Q5H IV  Last administered on 3/18/20at 

02:46;  Start 3/18/20 at 03:00;  Stop 3/18/20 at 10:21;  Status DC


Calcium Gluconate 1000 mg/Sodium Chloride 110 ml @  220 mls/hr 1X  ONCE IV  Last

administered on 3/18/20at 03:21;  Start 3/18/20 at 03:30;  Stop 3/18/20 at 

03:59;  Status DC


Sodium Bicarbonate 50 meq/Sodium Chloride 1,050 ml @  75 mls/hr Q14H IV  Last 

administered on 3/22/20at 21:10;  Start 3/18/20 at 07:30;  Stop 3/23/20 at 

10:28;  Status DC


Calcium Gluconate 2000 mg/Sodium Chloride 120 ml @  220 mls/hr 1X  ONCE IV  Last

administered on 3/18/20at 09:05;  Start 3/18/20 at 07:30;  Stop 3/18/20 at 

08:02;  Status DC


Lidocaine HCl (Xylocaine-Mpf 1% 2ml Vial) 2 ml STK-MED ONCE .ROUTE ;  Start 

3/18/20 at 08:47;  Stop 3/18/20 at 08:47;  Status DC


Meropenem 500 mg/ Sodium Chloride 50 ml @  100 mls/hr Q12HR IV  Last 

administered on 3/23/20at 21:01;  Start 3/18/20 at 18:00;  Stop 3/24/20 at 

07:58;  Status DC


Lidocaine HCl (Buffered Lidocaine 1%) 3 ml STK-MED ONCE .ROUTE ;  Start 3/18/20 

at 09:46;  Stop 3/18/20 at 09:46;  Status DC


Lidocaine HCl (Buffered Lidocaine 1%) 6 ml 1X  ONCE INJ  Last administered on 

3/18/20at 10:26;  Start 3/18/20 at 10:15;  Stop 3/18/20 at 10:16;  Status DC


Info (Tpn Per Pharmacy) 1 each PRN DAILY  PRN MC SEE COMMENTS Last administered 

on 6/7/20at 08:44;  Start 3/18/20 at 12:00


Sodium Chloride 1,000 ml @  1,000 mls/hr Q1H PRN IV hypotension;  Start 3/18/20 

at 12:07;  Stop 3/18/20 at 18:06;  Status DC


Diphenhydramine HCl (Benadryl) 25 mg 1X PRN  PRN IV ITCHING;  Start 3/18/20 at 

12:15;  Stop 3/19/20 at 12:14;  Status DC


Diphenhydramine HCl (Benadryl) 25 mg 1X PRN  PRN IV ITCHING;  Start 3/18/20 at 

12:15;  Stop 3/19/20 at 12:14;  Status DC


Sodium Chloride 1,000 ml @  400 mls/hr Q2H30M PRN IV PATENCY;  Start 3/18/20 at 

12:07;  Stop 3/19/20 at 00:06;  Status DC


Info (PHARMACY MONITORING -- do not chart) 1 each PRN DAILY  PRN MC SEE 

COMMENTS;  Start 3/18/20 at 12:15;  Stop 3/20/20 at 08:13;  Status DC


Sodium Chloride 90 meq/Calcium Gluconate 10 meq/ Multivitamins 10 ml/Chromium/ 

Copper/Manganese/ Seleni/Zn 1 ml/ Total Parenteral Nutrition/Amino A

cids/Dextrose/ Fat Emulsion Intravenous 55.005 ml  @ 2.292 mls/hr TPN  CONT IV ;

 Start 3/18/20 at 22:00;  Stop 3/18/20 at 12:33;  Status DC


Info (Tpn Per Pharmacy) 1 each PRN DAILY  PRN MC SEE COMMENTS;  Start 3/18/20 at

12:30;  Status UNV


Sodium Chloride 90 meq/Calcium Gluconate 10 meq/ Multivitamins 10 ml/Chromium/ 

Copper/Manganese/ Seleni/Zn 0.5 ml/ Total Parenteral Nutrition/Amino 

Acids/Dextrose/ Fat Emulsion Intravenous 1,512 ml @  63 mls/hr TPN  CONT IV  

Last administered on 3/18/20at 22:06;  Start 3/18/20 at 22:00;  Stop 3/19/20 at 

21:59;  Status DC


Calcium Carbonate/ Glycine (Tums) 500 mg PRN AFTMEALHC  PRN PO INDIGESTION;  

Start 3/18/20 at 17:45;  Stop 5/13/20 at 10:25;  Status DC


Calcium Gluconate (Calcium Gluconate) 2,000 mg 1X  ONCE IVP  Last administered 

on 3/19/20at 02:19;  Start 3/19/20 at 02:15;  Stop 3/19/20 at 02:16;  Status DC


Calcium Chloride 3000 mg/Sodium Chloride 1,030 ml @  50 mls/hr [id number] IV  Last 

administered on 3/21/20at 02:17;  Start 3/19/20 at 08:00;  Stop 3/21/20 at 

15:23;  Status DC


Lorazepam (Ativan Inj) 1 mg PRN Q4HRS  PRN IVP ANXIETY / AGITATION, 2nd choic 

Last administered on 4/17/20at 03:51;  Start 3/19/20 at 09:00;  Stop 4/17/20 at 

09:19;  Status DC


Sodium Chloride 1,000 ml @  1,000 mls/hr Q1H PRN IV hypotension;  Start 3/19/20 

at 08:56;  Stop 3/19/20 at 14:55;  Status DC


Albumin Human 200 ml @  200 mls/hr 1X PRN  PRN IV Hypotension;  Start 3/19/20 at

09:00;  Stop 3/19/20 at 14:59;  Status DC


Diphenhydramine HCl (Benadryl) 25 mg 1X PRN  PRN IV ITCHING;  Start 3/19/20 at 0

9:00;  Stop 3/20/20 at 08:59;  Status DC


Diphenhydramine HCl (Benadryl) 25 mg 1X PRN  PRN IV ITCHING;  Start 3/19/20 at 

09:00;  Stop 3/20/20 at 08:59;  Status DC


Sodium Chloride 1,000 ml @  400 mls/hr Q2H30M PRN IV PATENCY;  Start 3/19/20 at 

08:56;  Stop 3/19/20 at 20:55;  Status DC


Info (PHARMACY MONITORING -- do not chart) 1 each PRN DAILY  PRN MC SEE 

COMMENTS;  Start 3/19/20 at 09:00;  Status UNV


Info (PHARMACY MONITORING -- do not chart) 1 each PRN DAILY  PRN MC SEE 

COMMENTS;  Start 3/19/20 at 09:00;  Stop 3/20/20 at 08:13;  Status DC


Digoxin (Lanoxin) 500 mcg 1X  ONCE IV  Last administered on 3/19/20at 10:04;  

Start 3/19/20 at 10:00;  Stop 3/19/20 at 10:01;  Status DC


Digoxin (Lanoxin) 125 mcg 1X  ONCE IV  Last administered on 3/19/20at 17:10;  

Start 3/19/20 at 18:00;  Stop 3/19/20 at 18:01;  Status DC


Magnesium Sulfate 100 ml @  25 mls/hr 1X  ONCE IV  Last administered on 3/19/

20at 12:48;  Start 3/19/20 at 13:00;  Stop 3/19/20 at 16:59;  Status DC


Sodium Chloride 90 meq/Magnesium Sulfate 10 meq/ Calcium Gluconate 20 meq/ 

Multivitamins 10 ml/Chromium/ Copper/Manganese/ Seleni/Zn 0.5 ml/ Total 

Parenteral Nutrition/Amino Acids/Dextrose/ Fat Emulsion Intravenous 1,512 ml @  

63 mls/hr TPN  CONT IV  Last administered on 3/19/20at 22:25;  Start 3/19/20 at 

22:00;  Stop 3/20/20 at 21:59;  Status DC


Sodium Chloride 1,000 ml @  1,000 mls/hr Q1H PRN IV hypotension;  Start 3/20/20 

at 08:05;  Stop 3/20/20 at 14:04;  Status DC


Albumin Human 200 ml @  200 mls/hr 1X  ONCE IV  Last administered on 3/20/20at 

08:57;  Start 3/20/20 at 08:15;  Stop 3/20/20 at 09:14;  Status DC


Diphenhydramine HCl (Benadryl) 25 mg 1X PRN  PRN IV ITCHING;  Start 3/20/20 at 

08:15;  Stop 3/21/20 at 08:14;  Status DC


Diphenhydramine HCl (Benadryl) 25 mg 1X PRN  PRN IV ITCHING;  Start 3/20/20 at 

08:15;  Stop 3/21/20 at 08:14;  Status DC


Sodium Chloride 1,000 ml @  400 mls/hr Q2H30M PRN IV PATENCY;  Start 3/20/20 at 

08:05;  Stop 3/20/20 at 20:04;  Status DC


Info (PHARMACY MONITORING -- do not chart) 1 each PRN DAILY  PRN MC SEE 

COMMENTS;  Start 3/20/20 at 08:15;  Stop 3/24/20 at 07:57;  Status DC


Sodium Chloride 90 meq/Potassium Chloride 15 meq/ Potassium Phosphate 10 mmol/ 

Magnesium Sulfate 10 meq/Calcium Gluconate 20 meq/ Multivitamins 10 ml/Chromium/

Copper/Manganese/ Seleni/Zn 0.5 ml/ Total Parenteral Nutrition/Amino 

Acids/Dextrose/ Fat Emulsion Intravenous 1,512 ml @  63 mls/hr TPN  CONT IV  

Last administered on 3/20/20at 21:01;  Start 3/20/20 at 22:00;  Stop 3/21/20 at 

21:59;  Status DC


Potassium Chloride/Water 100 ml @  100 mls/hr 1X  ONCE IV  Last administered on 

3/20/20at 14:09;  Start 3/20/20 at 14:00;  Stop 3/20/20 at 14:59;  Status DC


Benzocaine (Hurricaine One) 1 spray 1X  ONCE MM  Last administered on 3/20/20at 

16:38;  Start 3/20/20 at 14:30;  Stop 3/20/20 at 14:31;  Status DC


Lidocaine HCl (Glydo (Lidocaine) Jelly) 1 thomas 1X  ONCE MM  Last administered on 

3/20/20at 16:38;  Start 3/20/20 at 14:30;  Stop 3/20/20 at 14:31;  Status DC


Linezolid/Dextrose 300 ml @  300 mls/hr Q12HR IV  Last administered on 3/26/20at

21:04;  Start 3/20/20 at 20:00;  Stop 3/27/20 at 07:50;  Status DC


Acetaminophen (Tylenol) 650 mg PRN Q6HRS  PRN PO MILD PAIN / TEMP;  Start 

3/21/20 at 03:30;  Stop 3/21/20 at 03:36;  Status DC


Acetaminophen (Tylenol) 650 mg PRN Q6HRS  PRN PEG MILD PAIN / TEMP Last 

administered on 4/16/20at 19:56;  Start 3/21/20 at 03:36;  Stop 5/13/20 at 

10:25;  Status DC


Sodium Chloride 1,000 ml @  1,000 mls/hr Q1H PRN IV hypotension;  Start 3/21/20 

at 07:50;  Stop 3/21/20 at 13:49;  Status DC


Albumin Human 200 ml @  200 mls/hr 1X PRN  PRN IV Hypotension;  Start 3/21/20 at

08:00;  Stop 3/21/20 at 13:59;  Status DC


Sodium Chloride (Normal Saline Flush) 10 ml 1X PRN  PRN IV AP catheter pack;  

Start 3/21/20 at 08:00;  Stop 3/22/20 at 07:59;  Status DC


Sodium Chloride (Normal Saline Flush) 10 ml 1X PRN  PRN IV  catheter pack;  

Start 3/21/20 at 08:00;  Stop 3/22/20 at 07:59;  Status DC


Sodium Chloride 1,000 ml @  400 mls/hr Q2H30M PRN IV PATENCY;  Start 3/21/20 at 

07:50;  Stop 3/21/20 at 19:49;  Status DC


Info (PHARMACY MONITORING -- do not chart) 1 each PRN DAILY  PRN MC SEE 

COMMENTS;  Start 3/21/20 at 08:00;  Status UNV


Info (PHARMACY MONITORING -- do not chart) 1 each PRN DAILY  PRN MC SEE 

COMMENTS;  Start 3/21/20 at 08:00;  Stop 3/23/20 at 08:25;  Status DC


Sodium Chloride 90 meq/Potassium Chloride 15 meq/ Potassium Phosphate 10 mmol/ 

Magnesium Sulfate 10 meq/Calcium Gluconate 20 meq/ Multivitamins 10 ml/Chromium/

Copper/Manganese/ Seleni/Zn 0.5 ml/ Total Parenteral Nutrition/Amino 

Acids/Dextrose/ Fat Emulsion Intravenous 1,512 ml @  63 mls/hr TPN  CONT IV  

Last administered on 3/21/20at 20:57;  Start 3/21/20 at 22:00;  Stop 3/22/20 at 

21:59;  Status DC


Sodium Chloride 90 meq/Potassium Chloride 15 meq/ Potassium Phosphate 15 mmol/ 

Magnesium Sulfate 10 meq/Calcium Gluconate 20 meq/ Multivitamins 10 ml/Chromium/

Copper/Manganese/ Seleni/Zn 0.5 ml/ Total Parenteral Nutrition/Amino 

Acids/Dextrose/ Fat Emulsion Intravenous 1,512 ml @  63 mls/hr TPN  CONT IV ;  

Start 3/22/20 at 22:00;  Stop 3/22/20 at 14:16;  Status DC


Sodium Chloride 90 meq/Potassium Chloride 15 meq/ Potassium Phosphate 15 mmol/ 

Magnesium Sulfate 10 meq/Calcium Gluconate 20 meq/ Multivitamins 10 ml/Chromium/

Copper/Manganese/ Seleni/Zn 0.5 ml/ Total Parenteral Nutrition/Amino 

Acids/Dextrose/ Fat Emulsion Intravenous 1,200 ml @  50 mls/hr TPN  CONT IV ;  

Start 3/22/20 at 22:00;  Stop 3/22/20 at 14:17;  Status DC


Sodium Chloride 90 meq/Potassium Chloride 15 meq/ Potassium Phosphate 10 mmol/ 

Magnesium Sulfate 10 meq/Calcium Gluconate 20 meq/ Multivitamins 10 ml/Chromium/

Copper/Manganese/ Seleni/Zn 0.5 ml/ Total Parenteral Nutrition/Amino 

Acids/Dextrose/ Fat Emulsion Intravenous 1,200 ml @  50 mls/hr TPN  CONT IV  

Last administered on 3/22/20at 23:29;  Start 3/22/20 at 22:00;  Stop 3/23/20 at 

21:59;  Status DC


Sodium Chloride 1,000 ml @  1,000 mls/hr Q1H PRN IV hypotension;  Start 3/23/20 

at 07:28;  Stop 3/23/20 at 13:27;  Status DC


Albumin Human 200 ml @  200 mls/hr 1X  ONCE IV  Last administered on 3/23/20at 

08:51;  Start 3/23/20 at 07:30;  Stop 3/23/20 at 08:29;  Status DC


Diphenhydramine HCl (Benadryl) 25 mg 1X PRN  PRN IV ITCHING;  Start 3/23/20 at 

07:30;  Stop 3/24/20 at 07:29;  Status DC


Diphenhydramine HCl (Benadryl) 25 mg 1X PRN  PRN IV ITCHING;  Start 3/23/20 at 

07:30;  Stop 3/24/20 at 07:29;  Status DC


Sodium Chloride 1,000 ml @  400 mls/hr Q2H30M PRN IV PATENCY;  Start 3/23/20 at 

07:28;  Stop 3/23/20 at 19:27;  Status DC


Info (PHARMACY MONITORING -- do not chart) 1 each PRN DAILY  PRN MC SEE 

COMMENTS;  Start 3/23/20 at 07:30;  Stop 4/3/20 at 13:01;  Status DC


Metronidazole 100 ml @  100 mls/hr Q6HRS IV  Last administered on 4/8/20at 

06:26;  Start 3/23/20 at 08:30;  Stop 4/8/20 at 09:58;  Status DC


Micafungin Sodium 100 mg/Dextrose 100 ml @  100 mls/hr Q24H IV  Last 

administered on 4/30/20at 08:18;  Start 3/23/20 at 09:00;  Stop 4/30/20 at 20:58

;  Status DC


Propofol 0 ml @ As Directed STK-MED ONCE IV ;  Start 3/23/20 at 07:53;  Stop 

3/23/20 at 07:53;  Status DC


Etomidate (Amidate) 20 mg STK-MED ONCE IV ;  Start 3/23/20 at 07:53;  Stop 

3/23/20 at 07:54;  Status DC


Midazolam HCl (Versed) 5 mg STK-MED ONCE .ROUTE ;  Start 3/23/20 at 07:57;  Stop

3/23/20 at 07:57;  Status DC


Fentanyl Citrate 30 ml @ 0 mls/hr CONT  PRN IV SEE PROTOCOL Last administered on

4/17/20at 06:12;  Start 3/23/20 at 08:15;  Stop 4/17/20 at 09:19;  Status DC


Artificial Tears (Artificial Tears) 1 drop PRN Q1HR  PRN OU DRY EYE, 1st choice;

 Start 3/23/20 at 08:15;  Stop 4/29/20 at 05:31;  Status DC


Midazolam HCl 50 mg/Sodium Chloride 50 ml @ 0 mls/hr CONT  PRN IV SEE PROTOCOL 

Last administered on 3/26/20at 22:39;  Start 3/23/20 at 08:15;  Stop 3/28/20 at 

15:59;  Status DC


Etomidate (Amidate) 8 mg 1X  ONCE IV  Last administered on 3/23/20at 08:33;  

Start 3/23/20 at 08:30;  Stop 3/23/20 at 08:31;  Status DC


Succinylcholine Chloride (Anectine) 120 mg 1X  ONCE IV  Last administered on 3/

23/20at 08:34;  Start 3/23/20 at 08:30;  Stop 3/23/20 at 08:31;  Status DC


Midazolam HCl (Versed) 5 mg 1X  ONCE IV ;  Start 3/23/20 at 08:30;  Stop 3/23/20

at 08:31;  Status DC


Potassium Chloride 15 meq/ Bicarbonate Dialysis Soln w/ out KCl 5,007.5 ml  @ 1

,000 mls/ hr Q5H1M IV  Last administered on 3/24/20at 11:11;  Start 3/23/20 at 

12:00;  Stop 3/24/20 at 11:15;  Status DC


Potassium Chloride 15 meq/ Bicarbonate Dialysis Soln w/ out KCl 5,007.5 ml  @ 

1,000 mls/ hr Q5H1M IV  Last administered on 3/24/20at 11:12;  Start 3/23/20 at 

12:00;  Stop 3/24/20 at 11:17;  Status DC


Potassium Chloride 15 meq/ Bicarbonate Dialysis Soln w/ out KCl 5,007.5 ml  @ 

1,000 mls/ hr Q5H1M IV  Last administered on 3/24/20at 11:11;  Start 3/23/20 at 

12:00;  Stop 3/24/20 at 11:19;  Status DC


Sodium Chloride 90 meq/Potassium Chloride 15 meq/ Potassium Phosphate 10 mmol/ 

Magnesium Sulfate 10 meq/Calcium Gluconate 20 meq/ Multivitamins 10 ml/Chromium/

Copper/Manganese/ Seleni/Zn 0.5 ml/ Total Parenteral Nutrition/Amino 

Acids/Dextrose/ Fat Emulsion Intravenous 1,400 ml @  58.333 mls/ hr TPN  CONT IV

 Last administered on 3/23/20at 21:42;  Start 3/23/20 at 22:00;  Stop 3/24/20 at

21:59;  Status DC


Heparin Sodium (Porcine) (Heparin Sodium) 5,000 unit Q8HRS SQ  Last administered

on 3/28/20at 05:55;  Start 3/23/20 at 15:00;  Stop 3/28/20 at 13:28;  Status DC


Meropenem 500 mg/ Sodium Chloride 50 ml @  100 mls/hr Q6HRS IV  Last 

administered on 3/25/20at 06:00;  Start 3/24/20 at 09:00;  Stop 3/25/20 at 

07:29;  Status DC


Potassium Phosphate 20 mmol/ Sodium Chloride 106.6667 ml @  51.667 m... 1X  ONCE

IV  Last administered on 3/24/20at 11:22;  Start 3/24/20 at 10:15;  Stop 3/24/20

at 12:18;  Status DC


Acetaminophen (Tylenol Supp) 650 mg PRN Q6HRS  PRN MD MILD PAIN / TEMP > 100.3'F

Last administered on 6/7/20at 14:41;  Start 3/24/20 at 10:30


Potassium Chloride/Water 100 ml @  100 mls/hr Q1H IV  Last administered on 

3/24/20at 12:12;  Start 3/24/20 at 11:00;  Stop 3/24/20 at 12:59;  Status DC


Potassium Chloride 20 meq/ Bicarbonate Dialysis Soln w/ out KCl 5,010 ml @  

1,000 mls/hr Q5H1M IV  Last administered on 3/25/20at 08:48;  Start 3/24/20 at 

12:00;  Stop 3/25/20 at 13:03;  Status DC


Potassium Chloride 20 meq/ Bicarbonate Dialysis Soln w/ out KCl 5,010 ml @  

1,000 mls/hr Q5H1M IV  Last administered on 3/29/20at 14:52;  Start 3/24/20 at 

11:30;  Stop 3/29/20 at 19:59;  Status DC


Potassium Chloride 20 meq/ Bicarbonate Dialysis Soln w/ out KCl 5,010 ml @  

1,000 mls/hr Q5H1M IV  Last administered on 3/29/20at 14:53;  Start 3/24/20 at 

11:30;  Stop 3/29/20 at 19:59;  Status DC


Sodium Chloride 90 meq/Potassium Chloride 15 meq/ Potassium Phosphate 15 mmol/ 

Magnesium Sulfate 10 meq/Calcium Gluconate 15 meq/ Multivitamins 10 ml/Chromium/

Copper/Manganese/ Seleni/Zn 0.5 ml/ Total Parenteral Nutrition/Amino Acids/Dext

verenice/ Fat Emulsion Intravenous 1,400 ml @  58.333 mls/ hr TPN  CONT IV  Last 

administered on 3/24/20at 22:17;  Start 3/24/20 at 22:00;  Stop 3/25/20 at 

21:59;  Status DC


Cefepime HCl (Maxipime) 2 gm Q12HR IVP  Last administered on 4/7/20at 20:56;  

Start 3/25/20 at 09:00;  Stop 4/8/20 at 09:58;  Status DC


Daptomycin 500 mg/ Sodium Chloride 50 ml @  100 mls/hr Q48H IV  Last administe

red on 4/10/20at 09:57;  Start 3/25/20 at 08:30;  Stop 4/10/20 at 10:07;  Status

DC


Lidocaine HCl (Buffered Lidocaine 1%) 3 ml 1X  ONCE INJ  Last administered on 

3/25/20at 10:27;  Start 3/25/20 at 10:30;  Stop 3/25/20 at 10:31;  Status DC


Potassium Phosphate 20 mmol/ Sodium Chloride 106.6667 ml @  51.667 m... 1X  ONCE

IV  Last administered on 3/25/20at 12:51;  Start 3/25/20 at 13:00;  Stop 3/25/20

at 15:03;  Status DC


Sodium Chloride 90 meq/Potassium Chloride 15 meq/ Potassium Phosphate 18 mmol/ 

Magnesium Sulfate 8 meq/Calcium Gluconate 15 meq/ Multivitamins 10 ml/Chromium/ 

Copper/Manganese/ Seleni/Zn 0.5 ml/ Total Parenteral Nutrition/Amino 

Acids/Dextrose/ Fat Emulsion Intravenous 1,400 ml @  58.333 mls/ hr TPN  CONT IV

 Last administered on 3/25/20at 22:16;  Start 3/25/20 at 22:00;  Stop 3/26/20 at

21:59;  Status DC


Potassium Chloride 20 meq/ Bicarbonate Dialysis Soln w/ out KCl 5,010 ml @  

1,000 mls/hr Q5H1M IV  Last administered on 3/29/20at 14:54;  Start 3/25/20 at 

16:00;  Stop 3/29/20 at 19:59;  Status DC


Multi-Ingred Cream/Lotion/Oil/ Oint (Artificial Tears Eye Ointment) 1 thomas PRN 

Q1HR  PRN OU DRY EYE, 2nd choice Last administered on 4/13/20at 08:19;  Start 

3/25/20 at 17:30;  Stop 6/3/20 at 14:39;  Status DC


Sodium Chloride 90 meq/Potassium Chloride 15 meq/ Potassium Phosphate 18 mmol/ 

Magnesium Sulfate 8 meq/Calcium Gluconate 15 meq/ Multivitamins 10 ml/Chromium/ 

Copper/Manganese/ Seleni/Zn 0.5 ml/ Total Parenteral Nutrition/Amino 

Acids/Dextrose/ Fat Emulsion Intravenous 1,400 ml @  58.333 mls/ hr TPN  CONT IV

 Last administered on 3/26/20at 22:00;  Start 3/26/20 at 22:00;  Stop 3/27/20 at

21:59;  Status DC


Albumin Human 500 ml @  125 mls/hr 1X  ONCE IV ;  Start 3/26/20 at 14:15;  Stop 

3/26/20 at 18:14;  Status DC


Sodium Chloride 90 meq/Potassium Chloride 15 meq/ Potassium Phosphate 18 mmol/ 

Magnesium Sulfate 8 meq/Calcium Gluconate 15 meq/ Multivitamins 10 ml/Chromium/ 

Copper/Manganese/ Seleni/Zn 0.5 ml/ Insulin Human Regular 10 unit/ Total 

Parenteral Nutrition/Amino Acids/Dextrose/ Fat Emulsion Intravenous 1,400 ml @  

58.333 mls/ hr TPN  CONT IV  Last administered on 3/27/20at 21:43;  Start 

3/27/20 at 22:00;  Stop 3/28/20 at 21:59;  Status DC


Lidocaine HCl (Buffered Lidocaine 1%) 3 ml STK-MED ONCE .ROUTE ;  Start 3/25/20 

at 10:00;  Stop 3/27/20 at 13:57;  Status DC


Midazolam HCl 100 mg/Sodium Chloride 100 ml @ 7 mls/hr CONT  PRN IV SEE PROTOCOL

Last administered on 4/8/20at 15:35;  Start 3/28/20 at 16:00;  Stop 6/3/20 at 

14:38;  Status DC


Sodium Chloride 90 meq/Potassium Chloride 15 meq/ Potassium Phosphate 18 mmol/ 

Magnesium Sulfate 8 meq/Calcium Gluconate 15 meq/ Multivitamins 10 ml/Chromium/ 

Copper/Manganese/ Seleni/Zn 0.5 ml/ Insulin Human Regular 15 unit/ Total 

Parenteral Nutrition/Amino Acids/Dextrose/ Fat Emulsion Intravenous 1,400 ml @  

58.333 mls/ hr TPN  CONT IV  Last administered on 3/28/20at 20:34;  Start 

3/28/20 at 22:00;  Stop 3/29/20 at 21:59;  Status DC


Info ([facility] Electrolyte Protocol) 1 ea CONT PRN  PRN MC PER PROTOCOL;  Start 

3/29/20 at 13:15


Sodium Chloride 90 meq/Potassium Chloride 15 meq/ Potassium Phosphate 18 mmol/ 

Magnesium Sulfate 8 meq/Calcium Gluconate 15 meq/ Multivitamins 10 ml/Chromium/ 

Copper/Manganese/ Seleni/Zn 0.5 ml/ Insulin Human Regular 15 unit/ Total 

Parenteral Nutrition/Amino Acids/Dextrose/ Fat Emulsion Intravenous 1,400 ml @  

58.333 mls/ hr TPN  CONT IV  Last administered on 3/29/20at 22:05;  Start 

3/29/20 at 22:00;  Stop 3/30/20 at 21:59;  Status DC


Potassium Chloride 15 meq/ Bicarbonate Dialysis Soln w/ out KCl 5,007.5 ml  @ 

1,000 mls/ hr Q5H1M IV  Last administered on 4/1/20at 18:14;  Start 3/29/20 at 

20:00;  Stop 4/2/20 at 13:08;  Status DC


Potassium Chloride 15 meq/ Bicarbonate Dialysis Soln w/ out KCl 5,007.5 ml  @ 

1,000 mls/ hr Q5H1M IV  Last administered on 4/1/20at 18:14;  Start 3/29/20 at 

20:00;  Stop 4/2/20 at 13:08;  Status DC


Potassium Chloride 15 meq/ Bicarbonate Dialysis Soln w/ out KCl 5,007.5 ml  @ 

1,000 mls/ hr Q5H1M IV  Last administered on 4/1/20at 18:14;  Start 3/29/20 at 

20:00;  Stop 4/2/20 at 13:08;  Status DC


Iohexol (Omnipaque 240 Mg/ml) 30 ml 1X  ONCE PO  Last administered on 3/30/20at 

11:30;  Start 3/30/20 at 11:30;  Stop 3/30/20 at 11:33;  Status DC


Info (CONTRAST GIVEN -- Rx MONITORING) 1 each PRN DAILY  PRN MC SEE COMMENTS;  

Start 3/30/20 at 11:45;  Stop 4/1/20 at 11:44;  Status DC


Sodium Chloride 90 meq/Potassium Chloride 15 meq/ Potassium Phosphate 18 mmol/ 

Magnesium Sulfate 8 meq/Calcium Gluconate 15 meq/ Multivitamins 10 ml/Chromium/ 

Copper/Manganese/ Seleni/Zn 0.5 ml/ Insulin Human Regular 15 unit/ Total 

Parenteral Nutrition/Amino Acids/Dextrose/ Fat Emulsion Intravenous 1,400 ml @  

58.333 mls/ hr TPN  CONT IV  Last administered on 3/30/20at 21:47;  Start 

3/30/20 at 22:00;  Stop 3/31/20 at 21:59;  Status DC


Sodium Chloride 90 meq/Potassium Chloride 15 meq/ Potassium Phosphate 18 mmol/ 

Magnesium Sulfate 8 meq/Calcium Gluconate 15 meq/ Multivitamins 10 ml/Chromium/ 

Copper/Manganese/ Seleni/Zn 0.5 ml/ Insulin Human Regular 20 unit/ Total 

Parenteral Nutrition/Amino Acids/Dextrose/ Fat Emulsion Intravenous 1,400 ml @  

58.333 mls/ hr TPN  CONT IV  Last administered on 3/31/20at 21:36;  Start 

3/31/20 at 22:00;  Stop 4/1/20 at 21:59;  Status DC


Alteplase, Recombinant (Cathflo For Central Catheter Clearance) 1 mg 1X  ONCE 

INT CAT  Last administered on 3/31/20at 20:03;  Start 3/31/20 at 19:30;  Stop 3/

31/20 at 19:46;  Status DC


Alteplase, Recombinant (Cathflo For Central Catheter Clearance) 1 mg 1X  ONCE 

INT CAT  Last administered on 3/31/20at 22:05;  Start 3/31/20 at 22:00;  Stop 

3/31/20 at 22:01;  Status DC


Sodium Chloride 90 meq/Potassium Chloride 15 meq/ Potassium Phosphate 18 mmol/ 

Magnesium Sulfate 8 meq/Calcium Gluconate 15 meq/ Multivitamins 10 ml/Chromium/ 

Copper/Manganese/ Seleni/Zn 0.5 ml/ Insulin Human Regular 20 unit/ Total 

Parenteral Nutrition/Amino Acids/Dextrose/ Fat Emulsion Intravenous 1,400 ml @  

58.333 mls/ hr TPN  CONT IV  Last administered on 4/1/20at 21:30;  Start 4/1/20 

at 22:00;  Stop 4/2/20 at 21:59;  Status DC


Dexmedetomidine HCl 400 mcg/ Sodium Chloride 100 ml @ 0 mls/hr CONT  PRN IV 

ANXIETY / AGITATION Last administered on 5/30/20at 12:57;  Start 4/2/20 at 

08:15;  Stop 5/30/20 at 18:31;  Status DC


Sodium Chloride 500 ml @  500 mls/hr 1X PRN  PRN IV ELEVATED BP, SEE COMMENTS;  

Start 4/2/20 at 08:15


Atropine Sulfate (ATROPINE 0.5mg SYRINGE) 0.5 mg PRN Q5MIN  PRN IV SEE COMMENTS;

 Start 4/2/20 at 08:15


Furosemide (Lasix) 20 mg 1X  ONCE IVP  Last administered on 4/2/20at 08:19;  

Start 4/2/20 at 08:15;  Stop 4/2/20 at 08:16;  Status DC


Lidocaine HCl (Buffered Lidocaine 1%) 3 ml STK-MED ONCE .ROUTE ;  Start 4/2/20 

at 08:39;  Stop 4/2/20 at 08:39;  Status DC


Lidocaine HCl (Buffered Lidocaine 1%) 6 ml 1X  ONCE INJ  Last administered on 

4/2/20at 09:05;  Start 4/2/20 at 09:00;  Stop 4/2/20 at 09:06;  Status DC


Sodium Chloride 90 meq/Potassium Chloride 15 meq/ Potassium Phosphate 18 mmol/ M

agnesium Sulfate 8 meq/Calcium Gluconate 15 meq/ Multivitamins 10 ml/Chromium/ 

Copper/Manganese/ Seleni/Zn 0.5 ml/ Insulin Human Regular 20 unit/ Total 

Parenteral Nutrition/Amino Acids/Dextrose/ Fat Emulsion Intravenous 1,400 ml @  

58.333 mls/ hr TPN  CONT IV  Last administered on 4/2/20at 22:45;  Start 4/2/20 

at 22:00;  Stop 4/3/20 at 21:59;  Status DC


Sodium Chloride 1,000 ml @  1,000 mls/hr Q1H PRN IV hypotension;  Start 4/3/20 

at 07:30;  Stop 4/3/20 at 13:29;  Status DC


Albumin Human 200 ml @  200 mls/hr 1X PRN  PRN IV Hypotension Last administered 

on 4/3/20at 09:36;  Start 4/3/20 at 07:30;  Stop 4/3/20 at 13:29;  Status DC


Sodium Chloride (Normal Saline Flush) 10 ml 1X PRN  PRN IV AP catheter pack;  

Start 4/3/20 at 07:30;  Stop 4/3/20 at 21:29;  Status DC


Sodium Chloride (Normal Saline Flush) 10 ml 1X PRN  PRN IV  catheter pack;  

Start 4/3/20 at 07:30;  Stop 4/4/20 at 07:29;  Status DC


Sodium Chloride 1,000 ml @  400 mls/hr Q2H30M PRN IV PATENCY;  Start 4/3/20 at 

07:30;  Stop 4/3/20 at 19:29;  Status DC


Info (PHARMACY MONITORING -- do not chart) 1 each PRN DAILY  PRN MC SEE 

COMMENTS;  Start 4/3/20 at 07:30;  Stop 4/3/20 at 13:02;  Status DC


Info (PHARMACY MONITORING -- do not chart) 1 each PRN DAILY  PRN MC SEE 

COMMENTS;  Start 4/3/20 at 07:30;  Stop 4/5/20 at 12:45;  Status DC


Sodium Chloride 90 meq/Potassium Chloride 15 meq/ Potassium Phosphate 10 mmol/ 

Magnesium Sulfate 8 meq/Calcium Gluconate 15 meq/ Multivitamins 10 ml/Chromium/ 

Copper/Manganese/ Seleni/Zn 0.5 ml/ Insulin Human Regular 25 unit/ Total 

Parenteral Nutrition/Amino Acids/Dextrose/ Fat Emulsion Intravenous 1,400 ml @  

58.333 mls/ hr TPN  CONT IV  Last administered on 4/3/20at 22:19;  Start 4/3/20 

at 22:00;  Stop 4/4/20 at 21:59;  Status DC


Heparin Sodium (Porcine) (Heparin Sodium) 5,000 unit Q12HR SQ  Last administered

on 4/26/20at 08:59;  Start 4/3/20 at 21:00;  Stop 4/26/20 at 10:05;  Status DC


Ondansetron HCl (Zofran) 4 mg PRN Q6HRS  PRN IV NAUSEA/VOMITING;  Start 4/6/20 

at 07:00;  Stop 4/7/20 at 06:59;  Status DC


Fentanyl Citrate (Fentanyl 2ml Vial) 25 mcg PRN Q5MIN  PRN IV MILD PAIN 1-3;  

Start 4/6/20 at 07:00;  Stop 4/7/20 at 06:59;  Status DC


Fentanyl Citrate (Fentanyl 2ml Vial) 50 mcg PRN Q5MIN  PRN IV MODERATE TO SEVERE

PAIN;  Start 4/6/20 at 07:00;  Stop 4/7/20 at 06:59;  Status DC


Ringer's Solution 1,000 ml @  30 mls/hr Q24H IV ;  Start 4/6/20 at 07:00;  Stop 

4/6/20 at 18:59;  Status DC


Lidocaine HCl (Xylocaine-Mpf 1% 2ml Vial) 2 ml PRN 1X  PRN ID PRIOR TO IV START;

 Start 4/6/20 at 07:00;  Stop 4/7/20 at 06:59;  Status DC


Prochlorperazine Edisylate (Compazine) 5 mg PACU PRN  PRN IV NAUSEA, MRX1;  

Start 4/6/20 at 07:00;  Stop 4/7/20 at 06:59;  Status DC


Sodium Chloride 1,000 ml @  1,000 mls/hr Q1H PRN IV hypotension;  Start 4/4/20 

at 09:10;  Stop 4/4/20 at 15:09;  Status DC


Albumin Human 200 ml @  200 mls/hr 1X PRN  PRN IV Hypotension Last administered 

on 4/4/20at 10:10;  Start 4/4/20 at 09:15;  Stop 4/4/20 at 15:14;  Status DC


Sodium Chloride 1,000 ml @  400 mls/hr Q2H30M PRN IV PATENCY;  Start 4/4/20 at 

09:10;  Stop 4/4/20 at 21:09;  Status DC


Info (PHARMACY MONITORING -- do not chart) 1 each PRN DAILY  PRN MC SEE 

COMMENTS;  Start 4/4/20 at 09:15;  Stop 4/5/20 at 12:45;  Status DC


Info (PHARMACY MONITORING -- do not chart) 1 each PRN DAILY  PRN MC SEE 

COMMENTS;  Start 4/4/20 at 09:15;  Stop 4/5/20 at 12:45;  Status DC


Sodium Chloride 90 meq/Potassium Chloride 15 meq/ Potassium Phosphate 10 mmol/ 

Magnesium Sulfate 8 meq/Calcium Gluconate 15 meq/ Multivitamins 10 ml/Chromium/ 

Copper/Manganese/ Seleni/Zn 0.5 ml/ Insulin Human Regular 25 unit/ Total 

Parenteral Nutrition/Amino Acids/Dextrose/ Fat Emulsion Intravenous 1,400 ml @  

58.333 mls/ hr TPN  CONT IV  Last administered on 4/4/20at 22:10;  Start 4/4/20 

at 22:00;  Stop 4/5/20 at 21:59;  Status DC


Magnesium Sulfate 50 ml @ 25 mls/hr PRN DAILY  PRN IV for Mag < 1.7 on am labs 

Last administered on 4/20/20at 17:27;  Start 4/5/20 at 09:15


Sodium Chloride 90 meq/Potassium Chloride 15 meq/ Potassium Phosphate 10 mmol/ 

Magnesium Sulfate 8 meq/Calcium Gluconate 15 meq/ Multivitamins 10 ml/Chromium/ 

Copper/Manganese/ Seleni/Zn 0.5 ml/ Insulin Human Regular 25 unit/ Total 

Parenteral Nutrition/Amino Acids/Dextrose/ Fat Emulsion Intravenous 1,400 ml @  

58.333 mls/ hr TPN  CONT IV  Last administered on 4/5/20at 21:20;  Start 4/5/20 

at 22:00;  Stop 4/6/20 at 21:59;  Status DC


Sodium Chloride 1,000 ml @  1,000 mls/hr Q1H PRN IV hypotension;  Start 4/5/20 

at 12:23;  Stop 4/5/20 at 18:22;  Status DC


Albumin Human 200 ml @  200 mls/hr 1X  ONCE IV  Last administered on 4/5/20at 

13:34;  Start 4/5/20 at 12:30;  Stop 4/5/20 at 13:29;  Status DC


Diphenhydramine HCl (Benadryl) 25 mg 1X PRN  PRN IV ITCHING;  Start 4/5/20 at 

12:30;  Stop 4/6/20 at 12:29;  Status DC


Diphenhydramine HCl (Benadryl) 25 mg 1X PRN  PRN IV ITCHING;  Start 4/5/20 at 

12:30;  Stop 4/6/20 at 12:29;  Status DC


Info (PHARMACY MONITORING -- do not chart) 1 each PRN DAILY  PRN MC SEE 

COMMENTS;  Start 4/5/20 at 12:30;  Status Cancel


Bupivacaine HCl/ Epinephrine Bitart (Sensorcain-Epi 0.5%-1:[id number] Mpf) 30 ml 

STK-MED ONCE .ROUTE  Last administered on 4/6/20at 11:44;  Start 4/6/20 at 

11:00;  Stop 4/6/20 at 11:01;  Status DC


Cellulose (Surgicel Fibrillar 1x2) 1 each STK-MED ONCE .ROUTE ;  Start 4/6/20 at

11:00;  Stop 4/6/20 at 11:01;  Status DC


Sodium Chloride 90 meq/Potassium Chloride 15 meq/ Potassium Phosphate 10 mmol/ 

Magnesium Sulfate 12 meq/Calcium Gluconate 15 meq/ Multivitamins 10 ml/Chromium/

Copper/Manganese/ Seleni/Zn 0.5 ml/ Insulin Human Regular 25 unit/ Total 

Parenteral Nutrition/Amino Acids/Dextrose/ Fat Emulsion Intravenous 1,400 ml @  

58.333 mls/ hr TPN  CONT IV  Last administered on 4/6/20at 22:24;  Start 4/6/20 

at 22:00;  Stop 4/7/20 at 21:59;  Status DC


Propofol 20 ml @ As Directed STK-MED ONCE IV ;  Start 4/6/20 at 11:07;  Stop 

4/6/20 at 11:07;  Status DC


Cellulose (Surgicel Hemostat 4x8) 1 each STK-MED ONCE .ROUTE  Last administered 

on 4/6/20at 11:44;  Start 4/6/20 at 11:55;  Stop 4/6/20 at 11:56;  Status DC


Sevoflurane (Ultane) 60 ml STK-MED ONCE IH ;  Start 4/6/20 at 12:46;  Stop 

4/6/20 at 12:46;  Status DC


Sodium Chloride 1,000 ml @  1,000 mls/hr Q1H PRN IV hypotension;  Start 4/6/20 

at 13:51;  Stop 4/6/20 at 19:50;  Status DC


Albumin Human 200 ml @  200 mls/hr 1X PRN  PRN IV Hypotension Last administered 

on 4/6/20at 14:51;  Start 4/6/20 at 14:00;  Stop 4/6/20 at 19:59;  Status DC


Diphenhydramine HCl (Benadryl) 25 mg 1X PRN  PRN IV ITCHING;  Start 4/6/20 at 

14:00;  Stop 4/7/20 at 13:59;  Status DC


Diphenhydramine HCl (Benadryl) 25 mg 1X PRN  PRN IV ITCHING;  Start 4/6/20 at 

14:00;  Stop 4/7/20 at 13:59;  Status DC


Sodium Chloride 1,000 ml @  400 mls/hr Q2H30M PRN IV PATENCY;  Start 4/6/20 at 

13:51;  Stop 4/7/20 at 01:50;  Status DC


Info (PHARMACY MONITORING -- do not chart) 1 each PRN DAILY  PRN MC SEE 

COMMENTS;  Start 4/6/20 at 14:00;  Stop 4/9/20 at 08:16;  Status DC


Heparin Sodium (Porcine) (Hep Lock Adult) 500 unit STK-MED ONCE IVP ;  Start 

4/7/20 at 09:29;  Stop 4/7/20 at 09:30;  Status DC


Sodium Chloride 1,000 ml @  1,000 mls/hr Q1H PRN IV hypotension;  Start 4/7/20 

at 10:43;  Stop 4/7/20 at 16:42;  Status DC


Sodium Chloride 1,000 ml @  400 mls/hr Q2H30M PRN IV PATENCY;  Start 4/7/20 at 

10:43;  Stop 4/7/20 at 22:42;  Status DC


Info (PHARMACY MONITORING -- do not chart) 1 each PRN DAILY  PRN MC SEE 

COMMENTS;  Start 4/7/20 at 10:45;  Status UNV


Info (PHARMACY MONITORING -- do not chart) 1 each PRN DAILY  PRN MC SEE 

COMMENTS;  Start 4/7/20 at 10:45;  Status UNV


Sodium Chloride 90 meq/Potassium Chloride 15 meq/ Magnesium Sulfate 12 

meq/Calcium Gluconate 15 meq/ Multivitamins 10 ml/Chromium/ Copper/Manganese/ 

Seleni/Zn 0.5 ml/ Insulin Human Regular 25 unit/ Total Parenteral 

Nutrition/Amino Acids/Dextrose/ Fat Emulsion Intravenous 1,400 ml @  58.333 mls/

hr TPN  CONT IV  Last administered on 4/7/20at 22:13;  Start 4/7/20 at 22:00;  

Stop 4/8/20 at 21:59;  Status DC


Sodium Chloride 1,000 ml @  1,000 mls/hr Q1H PRN IV hypotension;  Start 4/8/20 

at 07:50;  Stop 4/8/20 at 13:49;  Status DC


Albumin Human 200 ml @  200 mls/hr 1X  ONCE IV ;  Start 4/8/20 at 08:00;  Stop 

4/8/20 at 08:53;  Status DC


Diphenhydramine HCl (Benadryl) 25 mg 1X PRN  PRN IV ITCHING;  Start 4/8/20 at 

08:00;  Stop 4/9/20 at 07:59;  Status DC


Diphenhydramine HCl (Benadryl) 25 mg 1X PRN  PRN IV ITCHING;  Start 4/8/20 at 

08:00;  Stop 4/9/20 at 07:59;  Status DC


Info (PHARMACY MONITORING -- do not chart) 1 each PRN DAILY  PRN MC SEE 

COMMENTS;  Start 4/8/20 at 08:00;  Stop 4/9/20 at 08:16;  Status DC


Albumin Human 50 ml @ 50 mls/hr 1X  ONCE IV ;  Start 4/8/20 at 08:53;  Stop 

4/8/20 at 08:56;  Status DC


Albumin Human 200 ml @  50 mls/hr PRN 1X  PRN IV HYPOTENSION Last administered o

n 4/14/20at 11:54;  Start 4/8/20 at 09:00;  Stop 5/21/20 at 11:14;  Status DC


Meropenem 500 mg/ Sodium Chloride 50 ml @  100 mls/hr Q12H IV  Last administered

on 4/28/20at 10:45;  Start 4/8/20 at 10:00;  Stop 4/28/20 at 12:37;  Status DC


Sodium Chloride 90 meq/Magnesium Sulfate 12 meq/ Calcium Gluconate 15 meq/ 

Multivitamins 10 ml/Chromium/ Copper/Manganese/ Seleni/Zn 0.5 ml/ Insulin Human 

Regular 25 unit/ Total Parenteral Nutrition/Amino Acids/Dextrose/ Fat Emulsion 

Intravenous 1,400 ml @  58.333 mls/ hr TPN  CONT IV  Last administered on 

4/8/20at 21:41;  Start 4/8/20 at 22:00;  Stop 4/9/20 at 21:59;  Status DC


Sodium Chloride 1,000 ml @  1,000 mls/hr Q1H PRN IV hypotension;  Start 4/9/20 

at 07:58;  Stop 4/9/20 at 13:57;  Status DC


Albumin Human 200 ml @  200 mls/hr 1X PRN  PRN IV Hypotension Last administered 

on 4/9/20at 09:30;  Start 4/9/20 at 08:00;  Stop 4/9/20 at 13:59;  Status DC


Sodium Chloride 1,000 ml @  400 mls/hr Q2H30M PRN IV PATENCY;  Start 4/9/20 at 

07:58;  Stop 4/9/20 at 19:57;  Status DC


Info (PHARMACY MONITORING -- do not chart) 1 each PRN DAILY  PRN MC SEE 

COMMENTS;  Start 4/9/20 at 08:00;  Status Cancel


Info (PHARMACY MONITORING -- do not chart) 1 each PRN DAILY  PRN MC SEE 

COMMENTS;  Start 4/9/20 at 08:15;  Status UNV


Sodium Chloride 90 meq/Potassium Phosphate 5 mmol/ Magnesium Sulfate 12 

meq/Calcium Gluconate 15 meq/ Multivitamins 10 ml/Chromium/ Copper/Manganese/ 

Seleni/Zn 0.5 ml/ Insulin Human Regular 30 unit/ Total Parenteral 

Nutrition/Amino Acids/Dextrose/ Fat Emulsion Intravenous 1,400 ml @  58.333 mls/

hr TPN  CONT IV  Last administered on 4/9/20at 22:08;  Start 4/9/20 at 22:00;  

Stop 4/10/20 at 21:59;  Status DC


Linezolid/Dextrose 300 ml @  300 mls/hr Q12HR IV  Last administered on 4/20/20at

20:40;  Start 4/10/20 at 11:00;  Stop 4/21/20 at 08:10;  Status DC


Sodium Chloride 90 meq/Potassium Phosphate 15 mmol/ Magnesium Sulfate 12 

meq/Calcium Gluconate 15 meq/ Multivitamins 10 ml/Chromium/ Copper/Manganese/ 

Seleni/Zn 0.5 ml/ Insulin Human Regular 30 unit/ Total Parenteral Nutrition/Amin

o Acids/Dextrose/ Fat Emulsion Intravenous 1,400 ml @  58.333 mls/ hr TPN  CONT 

IV  Last administered on 4/10/20at 21:49;  Start 4/10/20 at 22:00;  Stop 4/11/20

at 21:59;  Status DC


Sodium Chloride 90 meq/Potassium Phosphate 15 mmol/ Magnesium Sulfate 12 m

eq/Calcium Gluconate 15 meq/ Multivitamins 10 ml/Chromium/ Copper/Manganese/ 

Seleni/Zn 0.5 ml/ Insulin Human Regular 40 unit/ Total Parenteral 

Nutrition/Amino Acids/Dextrose/ Fat Emulsion Intravenous 1,400 ml @  58.333 mls/

hr TPN  CONT IV  Last administered on 4/11/20at 21:21;  Start 4/11/20 at 22:00; 

Stop 4/12/20 at 21:59;  Status DC


Sodium Chloride 1,000 ml @  1,000 mls/hr Q1H PRN IV hypotension;  Start 4/11/20 

at 13:26;  Stop 4/11/20 at 19:25;  Status DC


Albumin Human 200 ml @  200 mls/hr 1X PRN  PRN IV Hypotension Last administered 

on 4/11/20at 15:00;  Start 4/11/20 at 13:30;  Stop 4/11/20 at 19:29;  Status DC


Sodium Chloride (Normal Saline Flush) 10 ml 1X PRN  PRN IV AP catheter pack;  

Start 4/11/20 at 13:30;  Stop 4/12/20 at 13:29;  Status DC


Sodium Chloride (Normal Saline Flush) 10 ml 1X PRN  PRN IV  catheter pack;  

Start 4/11/20 at 13:30;  Stop 4/12/20 at 13:29;  Status DC


Sodium Chloride 1,000 ml @  400 mls/hr Q2H30M PRN IV PATENCY;  Start 4/11/20 at 

13:26;  Stop 4/12/20 at 01:25;  Status DC


Info (PHARMACY MONITORING -- do not chart) 1 each PRN DAILY  PRN MC SEE 

COMMENTS;  Start 4/11/20 at 13:30;  Stop 4/11/20 at 13:33;  Status DC


Info (PHARMACY MONITORING -- do not chart) 1 each PRN DAILY  PRN MC SEE 

COMMENTS;  Start 4/11/20 at 13:30;  Stop 4/11/20 at 13:34;  Status DC


Sodium Chloride 90 meq/Potassium Phosphate 19 mmol/ Magnesium Sulfate 12 

meq/Calcium Gluconate 15 meq/ Multivitamins 10 ml/Chromium/ Copper/Manganese/ 

Seleni/Zn 0.5 ml/ Insulin Human Regular 40 unit/ Total Parenteral 

Nutrition/Amino Acids/Dextrose/ Fat Emulsion Intravenous 1,400 ml @  58.333 mls/

hr TPN  CONT IV  Last administered on 4/12/20at 21:54;  Start 4/12/20 at 22:00; 

Stop 4/13/20 at 21:59;  Status DC


Sodium Chloride 1,000 ml @  1,000 mls/hr Q1H PRN IV hypotension;  Start 4/13/20 

at 09:35;  Stop 4/13/20 at 15:34;  Status DC


Albumin Human 200 ml @  200 mls/hr 1X PRN  PRN IV Hypotension;  Start 4/13/20 at

09:45;  Stop 4/13/20 at 15:44;  Status DC


Diphenhydramine HCl (Benadryl) 25 mg 1X PRN  PRN IV ITCHING;  Start 4/13/20 at 

09:45;  Stop 4/14/20 at 09:44;  Status DC


Diphenhydramine HCl (Benadryl) 25 mg 1X PRN  PRN IV ITCHING;  Start 4/13/20 at 

09:45;  Stop 4/14/20 at 09:44;  Status DC


Sodium Chloride 1,000 ml @  400 mls/hr Q2H30M PRN IV PATENCY;  Start 4/13/20 at 

09:35;  Stop 4/13/20 at 21:34;  Status DC


Info (PHARMACY MONITORING -- do not chart) 1 each PRN DAILY  PRN MC SEE 

COMMENTS;  Start 4/13/20 at 09:45;  Status Cancel


Sodium Chloride 100 meq/Potassium Phosphate 19 mmol/ Magnesium Sulfate 12 

meq/Calcium Gluconate 15 meq/ Multivitamins 10 ml/Chromium/ Copper/Manganese/ 

Seleni/Zn 0.5 ml/ Insulin Human Regular 40 unit/ Potassium Chloride 20 meq/ 

Total Parenteral Nutrition/Amino Acids/Dextrose/ Fat Emulsion Intravenous 1,400 

ml @  58.333 mls/ hr TPN  CONT IV  Last administered on 4/13/20at 22:02;  Start 

4/13/20 at 22:00;  Stop 4/14/20 at 21:59;  Status DC


Furosemide (Lasix) 40 mg 1X  ONCE IVP  Last administered on 4/13/20at 14:39;  

Start 4/13/20 at 14:30;  Stop 4/13/20 at 14:31;  Status DC


Metronidazole 100 ml @  100 mls/hr Q8HRS IV  Last administered on 4/21/20at 

06:04;  Start 4/14/20 at 10:00;  Stop 4/21/20 at 08:10;  Status DC


Sodium Chloride 1,000 ml @  1,000 mls/hr Q1H PRN IV hypotension;  Start 4/14/20 

at 08:00;  Stop 4/14/20 at 13:59;  Status DC


Albumin Human 200 ml @  200 mls/hr 1X PRN  PRN IV Hypotension;  Start 4/14/20 at

08:00;  Stop 4/14/20 at 13:59;  Status DC


Sodium Chloride 1,000 ml @  400 mls/hr Q2H30M PRN IV PATENCY;  Start 4/14/20 at 

08:00;  Stop 4/14/20 at 19:59;  Status DC


Info (PHARMACY MONITORING -- do not chart) 1 each PRN DAILY  PRN MC SEE 

COMMENTS;  Start 4/14/20 at 11:30;  Status UNV


Info (PHARMACY MONITORING -- do not chart) 1 each PRN DAILY  PRN MC SEE 

COMMENTS;  Start 4/14/20 at 11:30;  Stop 4/16/20 at 12:13;  Status DC


Sodium Chloride 100 meq/Potassium Phosphate 19 mmol/ Magnesium Sulfate 12 

meq/Calcium Gluconate 15 meq/ Multivitamins 10 ml/Chromium/ Copper/Manganese/ 

Seleni/Zn 0.5 ml/ Insulin Human Regular 40 unit/ Potassium Chloride 20 meq/ 

Total Parenteral Nutrition/Amino Acids/Dextrose/ Fat Emulsion Intravenous 1,400 

ml @  58.333 mls/ hr TPN  CONT IV  Last administered on 4/14/20at 21:52;  Start 

4/14/20 at 22:00;  Stop 4/15/20 at 21:59;  Status DC


Sodium Chloride (Normal Saline Flush) 10 ml QSHIFT  PRN IV AFTER MEDS AND BLOOD 

DRAWS;  Start 4/14/20 at 15:00;  Stop 5/12/20 at 11:27;  Status DC


Sodium Chloride (Normal Saline Flush) 10 ml PRN Q5MIN  PRN IV AFTER MEDS AND 

BLOOD DRAWS;  Start 4/14/20 at 15:00


Sodium Chloride (Normal Saline Flush) 20 ml PRN Q5MIN  PRN IV AFTER MEDS AND 

BLOOD DRAWS;  Start 4/14/20 at 15:00


Sodium Chloride 100 meq/Potassium Phosphate 19 mmol/ Magnesium Sulfate 12 

meq/Calcium Gluconate 15 meq/ Multivitamins 10 ml/Chromium/ Copper/Manganese/ 

Seleni/Zn 0.5 ml/ Insulin Human Regular 40 unit/ Potassium Chloride 20 meq/ 

Total Parenteral Nutrition/Amino Acids/Dextrose/ Fat Emulsion Intravenous 1,400 

ml @  58.333 mls/ hr TPN  CONT IV  Last administered on 4/15/20at 21:20;  Start 

4/15/20 at 22:00;  Stop 4/16/20 at 21:59;  Status DC


Lidocaine HCl (Buffered Lidocaine 1%) 3 ml STK-MED ONCE .ROUTE ;  Start 4/15/20 

at 13:16;  Stop 4/15/20 at 13:16;  Status DC


Lidocaine HCl (Buffered Lidocaine 1%) 6 ml 1X  ONCE INJ  Last administered on 

4/15/20at 13:45;  Start 4/15/20 at 13:30;  Stop 4/15/20 at 13:31;  Status DC


Albumin Human 100 ml @  100 mls/hr 1X  ONCE IV  Last administered on 4/15/20at 

15:41;  Start 4/15/20 at 15:00;  Stop 4/15/20 at 15:59;  Status DC


Albumin Human 50 ml @ 50 mls/hr 1X  ONCE IV  Last administered on 4/15/20at 

15:00;  Start 4/15/20 at 15:00;  Stop 4/15/20 at 15:59;  Status DC


Info (PHARMACY MONITORING -- do not chart) 1 each PRN DAILY  PRN MC SEE 

COMMENTS;  Start 4/16/20 at 11:30;  Status Cancel


Info (PHARMACY MONITORING -- do not chart) 1 each PRN DAILY  PRN MC SEE 

COMMENTS;  Start 4/16/20 at 11:30;  Status UNV


Sodium Chloride 100 meq/Potassium Phosphate 10 mmol/ Magnesium Sulfate 12 

meq/Calcium Gluconate 15 meq/ Multivitamins 10 ml/Chromium/ Copper/Manganese/ 

Seleni/Zn 0.5 ml/ Insulin Human Regular 35 unit/ Potassium Chloride 20 meq/ 

Total Parenteral Nutrition/Amino Acids/Dextrose/ Fat Emulsion Intravenous 1,400 

ml @  58.333 mls/ hr TPN  CONT IV  Last administered on 4/16/20at 22:10;  Start 

4/16/20 at 22:00;  Stop 4/17/20 at 21:59;  Status DC


Sodium Chloride 100 meq/Potassium Phosphate 5 mmol/ Magnesium Sulfate 12 

meq/Calcium Gluconate 15 meq/ Multivitamins 10 ml/Chromium/ Copper/Manganese/ 

Seleni/Zn 0.5 ml/ Insulin Human Regular 35 unit/ Potassium Chloride 20 meq/ 

Total Parenteral Nutrition/Amino Acids/Dextrose/ Fat Emulsion Intravenous 1,400 

ml @  58.333 mls/ hr TPN  CONT IV  Last administered on 4/17/20at 22:59;  Start 

4/17/20 at 22:00;  Stop 4/18/20 at 21:59;  Status DC


Sodium Chloride 1,000 ml @  1,000 mls/hr Q1H PRN IV hypotension;  Start 4/18/20 

at 08:27;  Stop 4/18/20 at 14:26;  Status DC


Albumin Human 200 ml @  200 mls/hr 1X PRN  PRN IV Hypotension Last administered 

on 4/18/20at 09:18;  Start 4/18/20 at 08:30;  Stop 4/18/20 at 14:29;  Status DC


Sodium Chloride 1,000 ml @  400 mls/hr Q2H30M PRN IV PATENCY;  Start 4/18/20 at 

08:27;  Stop 4/18/20 at 20:26;  Status DC


Info (PHARMACY MONITORING -- do not chart) 1 each PRN DAILY  PRN MC SEE 

COMMENTS;  Start 4/18/20 at 08:30;  Status Cancel


Info (PHARMACY MONITORING -- do not chart) 1 each PRN DAILY  PRN MC SEE 

COMMENTS;  Start 4/18/20 at 08:30;  Stop 4/26/20 at 13:10;  Status DC


Sodium Chloride 100 meq/Potassium Chloride 40 meq/ Magnesium Sulfate 15 

meq/Calcium Gluconate 15 meq/ Multivitamins 10 ml/Chromium/ Copper/Manganese/ 

Seleni/Zn 0.5 ml/ Insulin Human Regular 35 unit/ Total Parenteral 

Nutrition/Amino Acids/Dextrose/ Fat Emulsion Intravenous 1,400 ml @  58.333 mls/

hr TPN  CONT IV  Last administered on 4/18/20at 22:00;  Start 4/18/20 at 22:00; 

Stop 4/19/20 at 21:59;  Status DC


Potassium Chloride/Water 100 ml @  100 mls/hr 1X  ONCE IV  Last administered on 

4/18/20at 17:28;  Start 4/18/20 at 14:45;  Stop 4/18/20 at 15:44;  Status DC


Sodium Chloride 100 meq/Potassium Chloride 40 meq/ Magnesium Sulfate 15 m

eq/Calcium Gluconate 15 meq/ Multivitamins 10 ml/Chromium/ Copper/Manganese/ 

Seleni/Zn 0.5 ml/ Insulin Human Regular 35 unit/ Total Parenteral 

Nutrition/Amino Acids/Dextrose/ Fat Emulsion Intravenous 1,400 ml @  58.333 mls/

hr TPN  CONT IV  Last administered on 4/19/20at 22:46;  Start 4/19/20 at 22:00; 

Stop 4/20/20 at 21:59;  Status DC


Sodium Chloride 100 meq/Potassium Chloride 40 meq/ Magnesium Sulfate 20 

meq/Calcium Gluconate 15 meq/ Multivitamins 10 ml/Chromium/ Copper/Manganese/ 

Seleni/Zn 0.5 ml/ Insulin Human Regular 35 unit/ Total Parenteral 

Nutrition/Amino Acids/Dextrose/ Fat Emulsion Intravenous 1,400 ml @  58.333 mls/

hr TPN  CONT IV  Last administered on 4/20/20at 22:31;  Start 4/20/20 at 22:00; 

Stop 4/21/20 at 21:59;  Status DC


Fentanyl Citrate (Fentanyl 2ml Vial) 50 mcg PRN Q2HR  PRN IVP PAIN Last 

administered on 4/27/20at 13:32;  Start 4/20/20 at 21:00;  Stop 4/28/20 at 

12:53;  Status DC


Fentanyl Citrate (Fentanyl 2ml Vial) 25 mcg PRN Q2HR  PRN IVP PAIN;  Start 

4/20/20 at 21:00;  Stop 4/28/20 at 12:54;  Status DC


Enoxaparin Sodium (Lovenox 100mg Syringe) 100 mg Q12HR SQ ;  Start 4/21/20 at 

21:00;  Status UNV


Amino Acids/ Glycerin/ Electrolytes 1,000 ml @  75 mls/hr [id number] IV ;  Start 

4/20/20 at 21:15;  Status UNV


Sodium Chloride 1,000 ml @  1,000 mls/hr Q1H PRN IV hypotension;  Start 4/21/20 

at 07:56;  Stop 4/21/20 at 13:55;  Status DC


Albumin Human 200 ml @  200 mls/hr 1X PRN  PRN IV Hypotension Last administered 

on 4/21/20at 08:40;  Start 4/21/20 at 08:00;  Stop 4/21/20 at 13:59;  Status DC


Sodium Chloride 1,000 ml @  400 mls/hr Q2H30M PRN IV PATENCY;  Start 4/21/20 at 

07:56;  Stop 4/21/20 at 19:55;  Status DC


Info (PHARMACY MONITORING -- do not chart) 1 each PRN DAILY  PRN MC SEE 

COMMENTS;  Start 4/21/20 at 08:00;  Status UNV


Info (PHARMACY MONITORING -- do not chart) 1 each PRN DAILY  PRN MC SEE 

COMMENTS;  Start 4/21/20 at 08:00;  Status UNV


Daptomycin 430 mg/ Sodium Chloride 50 ml @  100 mls/hr Q24H IV  Last 

administered on 4/21/20at 12:35;  Start 4/21/20 at 09:00;  Stop 4/21/20 at 

12:49;  Status DC


Sodium Chloride 100 meq/Potassium Chloride 40 meq/ Magnesium Sulfate 20 

meq/Calcium Gluconate 15 meq/ Multivitamins 10 ml/Chromium/ Copper/Manganese/ 

Seleni/Zn 0.5 ml/ Insulin Human Regular 35 unit/ Total Parenteral 

Nutrition/Amino Acids/Dextrose/ Fat Emulsion Intravenous 1,400 ml @  58.333 mls/

hr TPN  CONT IV  Last administered on 4/21/20at 21:26;  Start 4/21/20 at 22:00; 

Stop 4/22/20 at 21:59;  Status DC


Daptomycin 430 mg/ Sodium Chloride 50 ml @  100 mls/hr Q48H IV ;  Start 4/23/20 

at 09:00;  Stop 4/22/20 at 11:55;  Status DC


Sodium Chloride 100 meq/Potassium Chloride 40 meq/ Magnesium Sulfate 20 

meq/Calcium Gluconate 15 meq/ Multivitamins 10 ml/Chromium/ Copper/Manganese/ 

Seleni/Zn 0.5 ml/ Insulin Human Regular 35 unit/ Total Parenteral Nutriti

on/Amino Acids/Dextrose/ Fat Emulsion Intravenous 1,400 ml @  58.333 mls/ hr TPN

 CONT IV  Last administered on 4/22/20at 22:27;  Start 4/22/20 at 22:00;  Stop 

4/23/20 at 21:59;  Status DC


Daptomycin 430 mg/ Sodium Chloride 50 ml @  100 mls/hr Q24H IV  Last 

administered on 4/24/20at 15:07;  Start 4/22/20 at 13:00;  Stop 4/25/20 at 

13:15;  Status DC


Sodium Chloride 100 meq/Potassium Chloride 40 meq/ Magnesium Sulfate 20 

meq/Calcium Gluconate 10 meq/ Multivitamins 10 ml/Chromium/ Copper/Manganese/ 

Seleni/Zn 0.5 ml/ Insulin Human Regular 35 unit/ Total Parenteral 

Nutrition/Amino Acids/Dextrose/ Fat Emulsion Intravenous 1,400 ml @  58.333 mls/

hr TPN  CONT IV  Last administered on 4/24/20at 00:06;  Start 4/23/20 at 22:00; 

Stop 4/24/20 at 21:59;  Status DC


Alteplase, Recombinant (Cathflo For Central Catheter Clearance) 1 mg 1X  ONCE 

INT CAT  Last administered on 4/24/20at 11:44;  Start 4/24/20 at 10:45;  Stop 

4/24/20 at 10:46;  Status DC


Ondansetron HCl (Zofran) 4 mg PRN Q6HRS  PRN IV NAUSEA/VOMITING;  Start 4/27/20 

at 07:00;  Stop 4/28/20 at 06:59;  Status DC


Fentanyl Citrate (Fentanyl 2ml Vial) 25 mcg PRN Q5MIN  PRN IV MILD PAIN 1-3;  S

tart 4/27/20 at 07:00;  Stop 4/28/20 at 06:59;  Status DC


Fentanyl Citrate (Fentanyl 2ml Vial) 50 mcg PRN Q5MIN  PRN IV MODERATE TO SEVERE

PAIN Last administered on 4/27/20at 10:17;  Start 4/27/20 at 07:00;  Stop 

4/28/20 at 06:59;  Status DC


Ringer's Solution 1,000 ml @  30 mls/hr Q24H IV ;  Start 4/27/20 at 07:00;  Stop

4/27/20 at 18:59;  Status DC


Lidocaine HCl (Xylocaine-Mpf 1% 2ml Vial) 2 ml PRN 1X  PRN ID PRIOR TO IV START;

 Start 4/27/20 at 07:00;  Stop 4/28/20 at 06:59;  Status DC


Prochlorperazine Edisylate (Compazine) 5 mg PACU PRN  PRN IV NAUSEA, MRX1;  

Start 4/27/20 at 07:00;  Stop 4/28/20 at 06:59;  Status DC


Sodium Acetate 50 meq/Potassium Acetate 55 meq/ Magnesium Sulfate 20 meq/Calcium

Gluconate 10 meq/ Multivitamins 10 ml/Chromium/ Copper/Manganese/ Seleni/Zn 0.5 

ml/ Insulin Human Regular 35 unit/ Total Parenteral Nutrition/Amino 

Acids/Dextrose/ Fat Emulsion Intravenous 1,400 ml @  58.333 mls/ hr TPN  CONT IV

;  Start 4/24/20 at 22:00;  Stop 4/24/20 at 14:15;  Status DC


Sodium Acetate 50 meq/Potassium Acetate 55 meq/ Magnesium Sulfate 20 meq/Calcium

Gluconate 10 meq/ Multivitamins 10 ml/Chromium/ Copper/Manganese/ Seleni/Zn 0.5 

ml/ Insulin Human Regular 35 unit/ Total Parenteral Nutrition/Amino 

Acids/Dextrose/ Fat Emulsion Intravenous 1,800 ml @  75 mls/hr TPN  CONT IV  

Last administered on 4/24/20at 22:38;  Start 4/24/20 at 22:00;  Stop 4/25/20 at 

21:59;  Status DC


Sodium Chloride 1,000 ml @  1,000 mls/hr Q1H PRN IV hypotension;  Start 4/24/20 

at 15:31;  Stop 4/24/20 at 21:30;  Status DC


Diphenhydramine HCl (Benadryl) 25 mg 1X PRN  PRN IV ITCHING;  Start 4/24/20 at 

15:45;  Stop 4/25/20 at 15:44;  Status DC


Diphenhydramine HCl (Benadryl) 25 mg 1X PRN  PRN IV ITCHING;  Start 4/24/20 at 

15:45;  Stop 4/25/20 at 15:44;  Status DC


Sodium Chloride 1,000 ml @  400 mls/hr Q2H30M PRN IV PATENCY;  Start 4/24/20 at 

15:31;  Stop 4/25/20 at 03:30;  Status DC


Info (PHARMACY MONITORING -- do not chart) 1 each PRN DAILY  PRN MC SEE 

COMMENTS;  Start 4/24/20 at 15:45;  Stop 5/26/20 at 14:14;  Status DC


Sodium Acetate 50 meq/Potassium Acetate 55 meq/ Magnesium Sulfate 20 meq/Calcium

Gluconate 10 meq/ Multivitamins 10 ml/Chromium/ Copper/Manganese/ Seleni/Zn 0.5 

ml/ Insulin Human Regular 35 unit/ Total Parenteral Nutrition/Amino 

Acids/Dextrose/ Fat Emulsion Intravenous 1,800 ml @  75 mls/hr TPN  CONT IV  

Last administered on 4/25/20at 22:03;  Start 4/25/20 at 22:00;  Stop 4/26/20 at 

21:59;  Status DC


Daptomycin 430 mg/ Sodium Chloride 50 ml @  100 mls/hr Q24H IV  Last 

administered on 4/30/20at 13:00;  Start 4/25/20 at 13:00;  Stop 4/30/20 at 

20:58;  Status DC


Heparin Sodium (Porcine) 1000 unit/Sodium Chloride 1,001 ml @  1,001 mls/hr 1X  

ONCE IRR ;  Start 4/27/20 at 06:00;  Stop 4/27/20 at 06:59;  Status DC


Potassium Acetate 55 meq/Magnesium Sulfate 20 meq/ Calcium Gluconate 10 meq/ 

Multivitamins 10 ml/Chromium/ Copper/Manganese/ Seleni/Zn 0.5 ml/ Insulin Human 

Regular 35 unit/ Total Parenteral Nutrition/Amino Acids/Dextrose/ Fat Emulsion 

Intravenous 1,920 ml @  80 mls/hr TPN  CONT IV  Last administered on 4/26/20at 

22:10;  Start 4/26/20 at 22:00;  Stop 4/27/20 at 21:59;  Status DC


Dexamethasone Sodium Phosphate (Decadron) 4 mg STK-MED ONCE .ROUTE ;  Start 

4/27/20 at 10:56;  Stop 4/27/20 at 10:57;  Status DC


Ondansetron HCl (Zofran) 4 mg STK-MED ONCE .ROUTE ;  Start 4/27/20 at 10:56;  

Stop 4/27/20 at 10:57;  Status DC


Rocuronium Bromide (Zemuron) 50 mg STK-MED ONCE .ROUTE ;  Start 4/27/20 at 

10:56;  Stop 4/27/20 at 10:57;  Status DC


Fentanyl Citrate (Fentanyl 2ml Vial) 100 mcg STK-MED ONCE .ROUTE ;  Start 

4/27/20 at 10:56;  Stop 4/27/20 at 10:57;  Status DC


Bupivacaine HCl/ Epinephrine Bitart (Sensorcain-Epi 0.5%-1:584888 Mpf) 30 ml 

STK-MED ONCE .ROUTE  Last administered on 4/27/20at 12:01;  Start 4/27/20 at 

10:58;  Stop 4/27/20 at 10:58;  Status DC


Cellulose (Surgicel Hemostat 2x14) 1 each STK-MED ONCE .ROUTE ;  Start 4/27/20 

at 10:58;  Stop 4/27/20 at 10:59;  Status DC


Iohexol (Omnipaque 300 Mg/ml) 50 ml STK-MED ONCE .ROUTE ;  Start 4/27/20 at 

10:58;  Stop 4/27/20 at 10:59;  Status DC


Cellulose (Surgicel Hemostat 4x8) 1 each STK-MED ONCE .ROUTE ;  Start 4/27/20 at

10:58;  Stop 4/27/20 at 10:59;  Status DC


Bisacodyl (Dulcolax Supp) 10 mg STK-MED ONCE .ROUTE ;  Start 4/27/20 at 10:59;  

Stop 4/27/20 at 10:59;  Status DC


Heparin Sodium (Porcine) 1000 unit/Sodium Chloride 1,001 ml @  1,001 mls/hr 1X  

ONCE IRR ;  Start 4/27/20 at 12:00;  Stop 4/27/20 at 12:59;  Status DC


Propofol 20 ml @ As Directed STK-MED ONCE IV ;  Start 4/27/20 at 11:05;  Stop 

4/27/20 at 11:05;  Status DC


Sevoflurane (Ultane) 90 ml STK-MED ONCE IH ;  Start 4/27/20 at 11:05;  Stop 

4/27/20 at 11:05;  Status DC


Sevoflurane (Ultane) 60 ml STK-MED ONCE IH ;  Start 4/27/20 at 12:26;  Stop 4/2 7/20 at 12:27;  Status DC


Propofol 20 ml @ As Directed STK-MED ONCE IV ;  Start 4/27/20 at 12:26;  Stop 

4/27/20 at 12:27;  Status DC


Phenylephrine HCl (PHENYLEPHRINE in 0.9% NACL PF) 1 mg STK-MED ONCE IV ;  Start 

4/27/20 at 12:34;  Stop 4/27/20 at 12:34;  Status DC


Heparin Sodium (Porcine) (Heparin Sodium) 5,000 unit Q12HR SQ  Last administered

on 5/6/20at 20:57;  Start 4/27/20 at 21:00;  Stop 5/7/20 at 09:59;  Status DC


Sodium Chloride (Normal Saline Flush) 3 ml QSHIFT  PRN IV AFTER MEDS AND BLOOD 

DRAWS;  Start 4/27/20 at 13:45


Naloxone HCl (Narcan) 0.4 mg PRN Q2MIN  PRN IV SEE INSTRUCTIONS Last 

administered on 6/6/20at 15:15;  Start 4/27/20 at 13:45


Sodium Chloride 1,000 ml @  25 mls/hr Q24H IV  Last administered on 5/26/20at 

13:37;  Start 4/27/20 at 13:37;  Stop 5/29/20 at 13:09;  Status DC


Naloxone HCl (Narcan) 0.4 mg PRN Q2MIN  PRN IV SEE INSTRUCTIONS;  Start 4/27/20 

at 14:30;  Status UNV


Sodium Chloride 1,000 ml @  25 mls/hr Q24H IV ;  Start 4/27/20 at 14:30;  Status

UNV


Hydromorphone HCl 30 ml @ 0 mls/hr CONT PRN  PRN IV PER PROTOCOL Last 

administered on 5/2/20at 16:08;  Start 4/27/20 at 14:30;  Stop 5/4/20 at 08:55; 

Status DC


Potassium Acetate 55 meq/Magnesium Sulfate 20 meq/ Calcium Gluconate 10 meq/ 

Multivitamins 10 ml/Chromium/ Copper/Manganese/ Seleni/Zn 0.5 ml/ Insulin Human 

Regular 35 unit/ Total Parenteral Nutrition/Amino Acids/Dextrose/ Fat Emulsion 

Intravenous 1,920 ml @  80 mls/hr TPN  CONT IV  Last administered on 4/27/20at 

22:01;  Start 4/27/20 at 22:00;  Stop 4/28/20 at 21:59;  Status DC


Bumetanide (Bumex) 2 mg BID92 IV  Last administered on 5/1/20at 13:50;  Start 

4/28/20 at 14:00;  Stop 5/2/20 at 14:10;  Status DC


Meropenem 1 gm/ Sodium Chloride 100 ml @  200 mls/hr Q8HRS IV  Last administered

on 5/22/20at 05:53;  Start 4/28/20 at 14:00;  Stop 5/22/20 at 09:31;  Status DC


Potassium Acetate 55 meq/Magnesium Sulfate 20 meq/ Calcium Gluconate 10 meq/ 

Multivitamins 10 ml/Chromium/ Copper/Manganese/ Seleni/Zn 0.5 ml/ Insulin Human 

Regular 35 unit/ Total Parenteral Nutrition/Amino Acids/Dextrose/ Fat Emulsion 

Intravenous 1,920 ml @  80 mls/hr TPN  CONT IV  Last administered on 4/28/20at 

22:02;  Start 4/28/20 at 22:00;  Stop 4/29/20 at 21:59;  Status DC


Hydromorphone HCl (Dilaudid Standard PCA) 12 mg STK-MED ONCE IV ;  Start 4/27/20

at 14:35;  Stop 4/28/20 at 13:53;  Status DC


Artificial Tears (Artificial Tears) 1 drop PRN Q15MIN  PRN OU DRY EYE Last 

administered on 5/29/20at 10:08;  Start 4/29/20 at 05:30


Hydromorphone HCl (Dilaudid Standard PCA) 12 mg STK-MED ONCE IV ;  Start 4/28/20

at 12:05;  Stop 4/29/20 at 09:15;  Status DC


Potassium Acetate 65 meq/Magnesium Sulfate 20 meq/ Calcium Gluconate 10 meq/ 

Multivitamins 10 ml/Chromium/ Copper/Manganese/ Seleni/Zn 0.5 ml/ Insulin Human 

Regular 30 unit/ Total Parenteral Nutrition/Amino Acids/Dextrose/ Fat Emulsion 

Intravenous 1,920 ml @  80 mls/hr TPN  CONT IV  Last administered on 4/29/20at 

22:22;  Start 4/29/20 at 22:00;  Stop 4/30/20 at 21:59;  Status DC


Cyclobenzaprine HCl (Flexeril) 10 mg PRN Q6HRS  PRN PO MUSCLE SPASMS;  Start 

4/30/20 at 10:45


Potassium Acetate 55 meq/Magnesium Sulfate 20 meq/ Calcium Gluconate 10 meq/ 

Multivitamins 10 ml/Chromium/ Copper/Manganese/ Seleni/Zn 0.5 ml/ Insulin Human 

Regular 30 unit/ Total Parenteral Nutrition/Amino Acids/Dextrose/ Fat Emulsion 

Intravenous 1,920 ml @  80 mls/hr TPN  CONT IV  Last administered on 5/1/20at 

01:00;  Start 4/30/20 at 22:00;  Stop 5/1/20 at 21:59;  Status DC


Magnesium Sulfate 50 ml @ 25 mls/hr 1X  ONCE IV  Last administered on 4/30/20at 

17:18;  Start 4/30/20 at 12:45;  Stop 4/30/20 at 14:44;  Status DC


Potassium Chloride/Water 100 ml @  100 mls/hr 1X  ONCE IV  Last administered on 

5/1/20at 11:27;  Start 5/1/20 at 12:00;  Stop 5/1/20 at 12:59;  Status DC


Hydromorphone HCl (Dilaudid Standard PCA) 12 mg STK-MED ONCE IV ;  Start 4/29/20

at 10:50;  Stop 5/1/20 at 11:02;  Status DC


Hydromorphone HCl (Dilaudid Standard PCA) 12 mg STK-MED ONCE IV ;  Start 4/30/20

at 13:47;  Stop 5/1/20 at 11:03;  Status DC


Potassium Acetate 30 meq/Magnesium Sulfate 20 meq/ Calcium Gluconate 10 meq/ 

Multivitamins 10 ml/Chromium/ Copper/Manganese/ Seleni/Zn 0.5 ml/ Insulin Human 

Regular 30 unit/ Potassium Chloride 30 meq/ Total Parenteral Nutrition/Amino 

Acids/Dextrose/ Fat Emulsion Intravenous 1,920 ml @  80 mls/hr TPN  CONT IV  

Last administered on 5/1/20at 22:34;  Start 5/1/20 at 22:00;  Stop 5/2/20 at 

21:59;  Status DC


Potassium Chloride/Water 100 ml @  100 mls/hr Q1H IV  Last administered on 

5/2/20at 13:05;  Start 5/2/20 at 07:00;  Stop 5/2/20 at 10:59;  Status DC


Magnesium Sulfate 50 ml @ 25 mls/hr 1X  ONCE IV  Last administered on 5/2/20at 

10:34;  Start 5/2/20 at 10:30;  Stop 5/2/20 at 12:29;  Status DC


Potassium Chloride 75 meq/ Magnesium Sulfate 20 meq/Calcium Gluconate 10 meq/ 

Multivitamins 10 ml/Chromium/ Copper/Manganese/ Seleni/Zn 0.5 ml/ Insulin Human 

Regular 30 unit/ Total Parenteral Nutrition/Amino Acids/Dextrose/ Fat Emulsion 

Intravenous 1,920 ml @  80 mls/hr TPN  CONT IV  Last administered on 5/2/20at 

21:51;  Start 5/2/20 at 22:00;  Stop 5/3/20 at 22:00;  Status DC


Potassium Chloride 75 meq/ Magnesium Sulfate 20 meq/Calcium Gluconate 10 meq/ 

Multivitamins 10 ml/Chromium/ Copper/Manganese/ Seleni/Zn 0.5 ml/ Insulin Human 

Regular 25 unit/ Total Parenteral Nutrition/Amino Acids/Dextrose/ Fat Emulsion 

Intravenous 1,920 ml @  80 mls/hr TPN  CONT IV  Last administered on 5/3/20at 

22:04;  Start 5/3/20 at 22:00;  Stop 5/4/20 at 21:59;  Status DC


Hydromorphone HCl (Dilaudid) 0.4 mg PRN Q4HRS  PRN IVP PAIN Last administered on

5/4/20at 10:57;  Start 5/4/20 at 09:00;  Stop 5/4/20 at 18:59;  Status DC


Micafungin Sodium 100 mg/Dextrose 100 ml @  100 mls/hr Q24H IV  Last 

administered on 5/26/20at 12:17;  Start 5/4/20 at 11:00;  Stop 5/27/20 at 09:59;

 Status DC


Daptomycin 485 mg/ Sodium Chloride 50 ml @  100 mls/hr Q24H IV  Last 

administered on 5/11/20at 13:10;  Start 5/4/20 at 11:00;  Stop 5/12/20 at 07:44;

 Status DC


Potassium Chloride 75 meq/ Magnesium Sulfate 15 meq/Calcium Gluconate 8 meq/ 

Multivitamins 10 ml/Chromium/ Copper/Manganese/ Seleni/Zn 0.5 ml/ Insulin Human 

Regular 25 unit/ Total Parenteral Nutrition/Amino Acids/Dextrose/ Fat Emulsion 

Intravenous 1,920 ml @  80 mls/hr TPN  CONT IV  Last administered on 5/4/20at 

23:08;  Start 5/4/20 at 22:00;  Stop 5/5/20 at 21:59;  Status DC


Haloperidol Lactate (Haldol Inj) 3 mg 1X  ONCE IVP  Last administered on 

5/4/20at 14:37;  Start 5/4/20 at 14:30;  Stop 5/4/20 at 14:31;  Status DC


Hydromorphone HCl (Dilaudid) 1 mg PRN Q4HRS  PRN IVP PAIN Last administered on 

5/18/20at 06:25;  Start 5/4/20 at 19:00;  Stop 5/18/20 at 17:10;  Status DC


Potassium Chloride 75 meq/ Magnesium Sulfate 15 meq/Calcium Gluconate 8 meq/ 

Multivitamins 10 ml/Chromium/ Copper/Manganese/ Seleni/Zn 0.5 ml/ Insulin Human 

Regular 20 unit/ Total Parenteral Nutrition/Amino Acids/Dextrose/ Fat Emulsion 

Intravenous 1,920 ml @  80 mls/hr TPN  CONT IV  Last administered on 5/5/20at 

22:10;  Start 5/5/20 at 22:00;  Stop 5/6/20 at 21:59;  Status DC


Lidocaine HCl (Buffered Lidocaine 1%) 3 ml STK-MED ONCE .ROUTE ;  Start 5/6/20 

at 11:31;  Stop 5/6/20 at 11:31;  Status DC


Lidocaine HCl (Buffered Lidocaine 1%) 3 ml STK-MED ONCE .ROUTE ;  Start 5/6/20 

at 12:28;  Stop 5/6/20 at 12:29;  Status DC


Lidocaine HCl (Buffered Lidocaine 1%) 6 ml 1X  ONCE INJ  Last administered on 

5/6/20at 12:53;  Start 5/6/20 at 12:45;  Stop 5/6/20 at 12:46;  Status DC


Potassium Chloride 75 meq/ Magnesium Sulfate 15 meq/Calcium Gluconate 8 meq/ 

Multivitamins 10 ml/Chromium/ Copper/Manganese/ Seleni/Zn 0.5 ml/ Insulin Human 

Regular 20 unit/ Total Parenteral Nutrition/Amino Acids/Dextrose/ Fat Emulsion 

Intravenous 1,920 ml @  80 mls/hr TPN  CONT IV  Last administered on 5/6/20at 

22:00;  Start 5/6/20 at 22:00;  Stop 5/7/20 at 21:59;  Status DC


Potassium Chloride 75 meq/ Magnesium Sulfate 15 meq/Calcium Gluconate 8 meq/ 

Multivitamins 10 ml/Chromium/ Copper/Manganese/ Seleni/Zn 0.5 ml/ Insulin Human 

Regular 15 unit/ Total Parenteral Nutrition/Amino Acids/Dextrose/ Fat Emulsion 

Intravenous 1,920 ml @  80 mls/hr TPN  CONT IV  Last administered on 5/7/20at 

22:28;  Start 5/7/20 at 22:00;  Stop 5/8/20 at 21:59;  Status DC


Vecuronium Bromide (Norcuron Bolus) 6 mg PRN Q6HRS  PRN IV VENT ASYNCHRONY;  

Start 5/7/20 at 19:15;  Stop 5/7/20 at 19:35;  Status DC


Bumetanide (Bumex) 2 mg 1X  ONCE IV  Last administered on 5/7/20at 22:09;  Start

5/7/20 at 19:45;  Stop 5/7/20 at 19:46;  Status DC


Lidocaine HCl (Buffered Lidocaine 1%) 3 ml STK-MED ONCE .ROUTE ;  Start 5/8/20 

at 07:59;  Stop 5/8/20 at 07:59;  Status DC


Midazolam HCl (Versed) 5 mg STK-MED ONCE .ROUTE ;  Start 5/8/20 at 08:36;  Stop 

5/8/20 at 08:36;  Status DC


Fentanyl Citrate (Fentanyl 5ml Vial) 250 mcg STK-MED ONCE .ROUTE ;  Start 5/8/20

at 08:36;  Stop 5/8/20 at 08:37;  Status DC


Lidocaine HCl (Buffered Lidocaine 1%) 3 ml 1X  ONCE IJ  Last administered on 

5/8/20at 09:30;  Start 5/8/20 at 09:15;  Stop 5/8/20 at 09:16;  Status DC


Midazolam HCl (Versed) 5 mg 1X  ONCE IV  Last administered on 5/8/20at 09:30;  

Start 5/8/20 at 09:15;  Stop 5/8/20 at 09:16;  Status DC


Fentanyl Citrate (Fentanyl 5ml Vial) 250 mcg 1X  ONCE IV  Last administered on 

5/8/20at 09:30;  Start 5/8/20 at 09:15;  Stop 5/8/20 at 09:16;  Status DC


Bumetanide (Bumex) 2 mg DAILY IV  Last administered on 5/18/20at 08:07;  Start 

5/8/20 at 10:00;  Stop 5/18/20 at 17:15;  Status DC


Potassium Chloride 75 meq/ Magnesium Sulfate 15 meq/ Multivitamins 10 

ml/Chromium/ Copper/Manganese/ Seleni/Zn 0.5 ml/ Insulin Human Regular 15 unit/ 

Total Parenteral Nutrition/Amino Acids/Dextrose/ Fat Emulsion Intravenous 1,920 

ml @  80 mls/hr TPN  CONT IV  Last administered on 5/8/20at 21:59;  Start 5/8/20

at 22:00;  Stop 5/9/20 at 21:59;  Status DC


Metoclopramide HCl (Reglan Vial) 10 mg PRN Q3HRS  PRN IVP NAUSEA/VOMITING-3rd 

choice Last administered on 5/14/20at 04:25;  Start 5/9/20 at 16:45


Potassium Chloride 75 meq/ Magnesium Sulfate 15 meq/ Multivitamins 10 

ml/Chromium/ Copper/Manganese/ Seleni/Zn 0.5 ml/ Insulin Human Regular 15 unit/ 

Total Parenteral Nutrition/Amino Acids/Dextrose/ Fat Emulsion Intravenous 1,920 

ml @  80 mls/hr TPN  CONT IV  Last administered on 5/9/20at 22:41;  Start 5/9/20

at 22:00;  Stop 5/10/20 at 21:59;  Status DC


Magnesium Sulfate 50 ml @ 25 mls/hr 1X  ONCE IV  Last administered on 5/10/20at 

10:44;  Start 5/10/20 at 09:00;  Stop 5/10/20 at 10:59;  Status DC


Potassium Chloride/Water 100 ml @  100 mls/hr 1X  ONCE IV  Last administered on 

5/10/20at 09:37;  Start 5/10/20 at 09:00;  Stop 5/10/20 at 09:59;  Status DC


Duloxetine HCl (Cymbalta) 30 mg DAILY PO  Last administered on 5/11/20at 09:48; 

Start 5/10/20 at 14:00;  Stop 5/13/20 at 10:25;  Status DC


Potassium Chloride 80 meq/ Magnesium Sulfate 20 meq/ Multivitamins 10 

ml/Chromium/ Copper/Manganese/ Seleni/Zn 0.5 ml/ Insulin Human Regular 15 unit/ 

Total Parenteral Nutrition/Amino Acids/Dextrose/ Fat Emulsion Intravenous 1,920 

ml @  80 mls/hr TPN  CONT IV  Last administered on 5/10/20at 21:42;  Start 

5/10/20 at 22:00;  Stop 5/11/20 at 21:59;  Status DC


Potassium Chloride 80 meq/ Magnesium Sulfate 20 meq/ Multivitamins 10 

ml/Chromium/ Copper/Manganese/ Seleni/Zn 0.5 ml/ Insulin Human Regular 15 unit/ 

Total Parenteral Nutrition/Amino Acids/Dextrose/ Fat Emulsion Intravenous 1,920 

ml @  80 mls/hr TPN  CONT IV  Last administered on 5/11/20at 22:20;  Start 

5/11/20 at 22:00;  Stop 5/12/20 at 21:59;  Status DC


Lidocaine HCl (Buffered Lidocaine 1%) 3 ml STK-MED ONCE .ROUTE ;  Start 5/12/20 

at 09:54;  Stop 5/12/20 at 09:55;  Status DC


Hydromorphone HCl (Dilaudid Standard PCA) 12 mg STK-MED ONCE IV ;  Start 5/1/20 

at 15:50;  Stop 5/12/20 at 11:24;  Status DC


Potassium Chloride 80 meq/ Magnesium Sulfate 20 meq/ Multivitamins 10 

ml/Chromium/ Copper/Manganese/ Seleni/Zn 0.5 ml/ Insulin Human Regular 15 unit/ 

Total Parenteral Nutrition/Amino Acids/Dextrose/ Fat Emulsion Intravenous 1,920 

ml @  80 mls/hr TPN  CONT IV  Last administered on 5/12/20at 21:40;  Start 

5/12/20 at 22:00;  Stop 5/13/20 at 21:59;  Status DC


Lidocaine HCl (Buffered Lidocaine 1%) 6 ml 1X  ONCE INJ  Last administered on 

5/12/20at 14:15;  Start 5/12/20 at 14:15;  Stop 5/12/20 at 14:16;  Status DC


Potassium Chloride 80 meq/ Magnesium Sulfate 20 meq/ Multivitamins 10 

ml/Chromium/ Copper/Manganese/ Seleni/Zn 1 ml/ Insulin Human Regular 15 unit/ 

Total Parenteral Nutrition/Amino Acids/Dextrose/ Fat Emulsion Intravenous 1,920 

ml @  80 mls/hr TPN  CONT IV  Last administered on 5/13/20at 22:04;  Start 

5/13/20 at 22:00;  Stop 5/14/20 at 21:59;  Status DC


Potassium Chloride/Water 100 ml @  100 mls/hr 1X  ONCE IV  Last administered on 

5/14/20at 11:34;  Start 5/14/20 at 11:00;  Stop 5/14/20 at 11:59;  Status DC


Potassium Chloride 90 meq/ Magnesium Sulfate 20 meq/ Multivitamins 10 

ml/Chromium/ Copper/Manganese/ Seleni/Zn 1 ml/ Insulin Human Regular 15 unit/ 

Total Parenteral Nutrition/Amino Acids/Dextrose/ Fat Emulsion Intravenous 1,920 

ml @  80 mls/hr TPN  CONT IV  Last administered on 5/14/20at 22:57;  Start 

5/14/20 at 22:00;  Stop 5/15/20 at 21:59;  Status DC


Potassium Chloride 90 meq/ Magnesium Sulfate 20 meq/ Multivitamins 10 

ml/Chromium/ Copper/Manganese/ Seleni/Zn 1 ml/ Insulin Human Regular 15 unit/ 

Total Parenteral Nutrition/Amino Acids/Dextrose/ Fat Emulsion Intravenous 1,920 

ml @  80 mls/hr TPN  CONT IV  Last administered on 5/15/20at 22:48;  Start 

5/15/20 at 22:00;  Stop 5/16/20 at 21:59;  Status DC


Potassium Chloride 90 meq/ Magnesium Sulfate 20 meq/ Multivitamins 10 

ml/Chromium/ Copper/Manganese/ Seleni/Zn 1 ml/ Insulin Human Regular 15 unit/ 

Total Parenteral Nutrition/Amino Acids/Dextrose/ Fat Emulsion Intravenous 1,890 

ml @  78.75 mls/ hr TPN  CONT IV  Last administered on 5/16/20at 22:15;  Start 

5/16/20 at 22:00;  Stop 5/17/20 at 21:59;  Status DC


Linezolid/Dextrose 300 ml @  300 mls/hr Q12HR IV  Last administered on 5/19/20at

21:08;  Start 5/17/20 at 09:00;  Stop 5/20/20 at 08:11;  Status DC


Daptomycin 450 mg/ Sodium Chloride 50 ml @  100 mls/hr Q24H IV  Last 

administered on 5/20/20at 09:25;  Start 5/17/20 at 09:00;  Stop 5/21/20 at 

08:30;  Status DC


Potassium Chloride 90 meq/ Magnesium Sulfate 20 meq/ Multivitamins 10 

ml/Chromium/ Copper/Manganese/ Seleni/Zn 1 ml/ Insulin Human Regular 15 unit/ 

Total Parenteral Nutrition/Amino Acids/Dextrose/ Fat Emulsion Intravenous 1,890 

ml @  78.75 mls/ hr TPN  CONT IV  Last administered on 5/17/20at 21:34;  Start 

5/17/20 at 22:00;  Stop 5/18/20 at 21:59;  Status DC


Lorazepam (Ativan Inj) 2 mg STK-MED ONCE .ROUTE ;  Start 5/17/20 at 14:58;  Stop

5/17/20 at 14:58;  Status DC


Metoprolol Tartrate (Lopressor Vial) 5 mg 1X  ONCE IVP  Last administered on 

5/17/20at 15:31;  Start 5/17/20 at 15:15;  Stop 5/17/20 at 15:16;  Status DC


Lorazepam (Ativan Inj) 2 mg 1X  ONCE IVP  Last administered on 5/17/20at 15:30; 

Start 5/17/20 at 15:15;  Stop 5/17/20 at 15:16;  Status DC


Enoxaparin Sodium (Lovenox 40mg Syringe) 40 mg Q24H SQ  Last administered on 

6/5/20at 17:44;  Start 5/17/20 at 17:00;  Stop 6/7/20 at 06:50;  Status DC


Lorazepam (Ativan Inj) 1 mg PRN Q4HRS  PRN IVP ANXIETY / AGITATION MILD-MOD Last

administered on 5/31/20at 15:55;  Start 5/17/20 at 19:15;  Stop 6/2/20 at 11:45;

 Status DC


Lorazepam (Ativan Inj) 2 mg PRN Q4HRS  PRN IVP ANXIETY / AGITATION SEVERE Last 

administered on 6/1/20at 07:55;  Start 5/17/20 at 19:15;  Stop 6/2/20 at 11:45; 

Status DC


Fentanyl Citrate (Fentanyl 2ml Vial) 50 mcg PRN Q4HRS  PRN IVP SEVERE PAIN Last 

administered on 6/8/20at 00:52;  Start 5/18/20 at 13:15


Fentanyl Citrate (Fentanyl 2ml Vial) 25 mcg PRN Q4HRS  PRN IVP MODERATE PAIN 

Last administered on 6/6/20at 21:55;  Start 5/18/20 at 13:15


Potassium Chloride 90 meq/ Magnesium Sulfate 20 meq/ Multivitamins 10 

ml/Chromium/ Copper/Manganese/ Seleni/Zn 1 ml/ Insulin Human Regular 15 unit/ 

Total Parenteral Nutrition/Amino Acids/Dextrose/ Fat Emulsion Intravenous 1,890 

ml @  78.75 mls/ hr TPN  CONT IV  Last administered on 5/18/20at 22:18;  Start 

5/18/20 at 22:00;  Stop 5/19/20 at 21:59;  Status DC


Furosemide (Lasix) 40 mg 1X  ONCE IVP  Last administered on 5/18/20at 21:51;  

Start 5/18/20 at 21:45;  Stop 5/18/20 at 21:48;  Status DC


Albumin Human 100 ml @  100 mls/hr 1X PRN  PRN IV SEE COMMENTS;  Start 5/19/20 

at 01:30


Furosemide (Lasix) 40 mg [id number] IVP  Last administered on 6/3/20at 08:04;  Start 

5/19/20 at 14:00;  Stop 6/3/20 at 13:07;  Status DC


Potassium Chloride 90 meq/ Magnesium Sulfate 20 meq/ Multivitamins 10 

ml/Chromium/ Copper/Manganese/ Seleni/Zn 1 ml/ Insulin Human Regular 15 unit/ 

Total Parenteral Nutrition/Amino Acids/Dextrose/ Fat Emulsion Intravenous 1,800 

ml @  75 mls/hr TPN  CONT IV  Last administered on 5/19/20at 22:31;  Start 

5/19/20 at 22:00;  Stop 5/20/20 at 21:59;  Status DC


Potassium Chloride 90 meq/ Magnesium Sulfate 20 meq/ Multivitamins 10 

ml/Chromium/ Copper/Manganese/ Seleni/Zn 1 ml/ Insulin Human Regular 15 unit/ 

Total Parenteral Nutrition/Amino Acids/Dextrose/ Fat Emulsion Intravenous 1,800 

ml @  75 mls/hr TPN  CONT IV  Last administered on 5/20/20at 22:28;  Start 

5/20/20 at 22:00;  Stop 5/21/20 at 21:59;  Status DC


Potassium Chloride 110 meq/ Magnesium Sulfate 20 meq/ Multivitamins 10 

ml/Chromium/ Copper/Manganese/ Seleni/Zn 1 ml/ Insulin Human Regular 15 unit/ 

Total Parenteral Nutrition/Amino Acids/Dextrose/ Fat Emulsion Intravenous 1,800 

ml @  75 mls/hr TPN  CONT IV  Last administered on 5/21/20at 22:01;  Start 

5/21/20 at 22:00;  Stop 5/22/20 at 21:59;  Status DC


Saliva Substitute (Biotene Moisturizing Mouth) 2 spray PRN Q15MIN  PRN PO DRY 

MOUTH;  Start 5/21/20 at 11:00


Potassium Chloride 110 meq/ Magnesium Sulfate 20 meq/ Multivitamins 10 

ml/Chromium/ Copper/Manganese/ Seleni/Zn 1 ml/ Insulin Human Regular 15 unit/ 

Total Parenteral Nutrition/Amino Acids/Dextrose/ Fat Emulsion Intravenous 1,800 

ml @  75 mls/hr TPN  CONT IV  Last administered on 5/22/20at 22:21;  Start 

5/22/20 at 22:00;  Stop 5/23/20 at 21:59;  Status DC


Potassium Chloride 110 meq/ Magnesium Sulfate 20 meq/ Multivitamins 10 

ml/Chromium/ Copper/Manganese/ Seleni/Zn 1 ml/ Insulin Human Regular 15 unit/ 

Total Parenteral Nutrition/Amino Acids/Dextrose/ Fat Emulsion Intravenous 1,800 

ml @  75 mls/hr TPN  CONT IV  Last administered on 5/23/20at 22:04;  Start 

5/23/20 at 22:00;  Stop 5/24/20 at 21:59;  Status DC


Potassium Chloride 110 meq/ Magnesium Sulfate 20 meq/ Multivitamins 10 

ml/Chromium/ Copper/Manganese/ Seleni/Zn 1 ml/ Insulin Human Regular 15 unit/ 

Total Parenteral Nutrition/Amino Acids/Dextrose/ Fat Emulsion Intravenous 1,800 

ml @  75 mls/hr TPN  CONT IV  Last administered on 5/24/20at 22:48;  Start 

5/24/20 at 22:00;  Stop 5/25/20 at 21:59;  Status DC


Potassium Chloride 70 meq/ Magnesium Sulfate 20 meq/ Multivitamins 10 

ml/Chromium/ Copper/Manganese/ Seleni/Zn 1 ml/ Insulin Human Regular 15 unit/ 

Total Parenteral Nutrition/Amino Acids/Dextrose/ Fat Emulsion Intravenous 1,800 

ml @  75 mls/hr TPN  CONT IV  Last administered on 5/25/20at 21:39;  Start 

5/25/20 at 22:00;  Stop 5/26/20 at 21:59;  Status DC


Meropenem 500 mg/ Sodium Chloride 50 ml @  100 mls/hr Q6HRS IV  Last a

dministered on 5/27/20at 06:02;  Start 5/25/20 at 18:00;  Stop 5/27/20 at 09:59;

 Status DC


Barium Sulfate (Varibar Thin Liquid Apple) 148 gm 1X  ONCE PO ;  Start 5/26/20 

at 11:45;  Stop 5/26/20 at 11:49;  Status DC


Potassium Chloride 70 meq/ Magnesium Sulfate 20 meq/ Multivitamins 10 

ml/Chromium/ Copper/Manganese/ Seleni/Zn 1 ml/ Insulin Human Regular 15 unit/ 

Total Parenteral Nutrition/Amino Acids/Dextrose/ Fat Emulsion Intravenous 1,800 

ml @  75 mls/hr TPN  CONT IV  Last administered on 5/26/20at 22:27;  Start 

5/26/20 at 22:00;  Stop 5/27/20 at 21:59;  Status DC


Piperacillin Sod/ Tazobactam Sod 3.375 gm/Sodium Chloride 50 ml @  100 mls/hr 

Q6HRS IV  Last administered on 6/4/20at 06:10;  Start 5/27/20 at 12:00;  Stop 

6/4/20 at 07:26;  Status DC


Potassium Chloride 70 meq/ Magnesium Sulfate 20 meq/ Multivitamins 10 

ml/Chromium/ Copper/Manganese/ Seleni/Zn 1 ml/ Insulin Human Regular 15 unit/ 

Total Parenteral Nutrition/Amino Acids/Dextrose/ Fat Emulsion Intravenous 1,800 

ml @  75 mls/hr TPN  CONT IV  Last administered on 5/27/20at 22:03;  Start 

5/27/20 at 22:00;  Stop 5/28/20 at 21:59;  Status DC


Potassium Chloride 70 meq/ Magnesium Sulfate 20 meq/ Multivitamins 10 

ml/Chromium/ Copper/Manganese/ Seleni/Zn 1 ml/ Insulin Human Regular 15 unit/ 

Total Parenteral Nutrition/Amino Acids/Dextrose/ Fat Emulsion Intravenous 1,800 

ml @  75 mls/hr TPN  CONT IV  Last administered on 5/28/20at 22:33;  Start 

5/28/20 at 22:00;  Stop 5/29/20 at 21:59;  Status DC


Potassium Chloride 70 meq/ Magnesium Sulfate 20 meq/ Multivitamins 10 

ml/Chromium/ Copper/Manganese/ Seleni/Zn 1 ml/ Insulin Human Regular 15 unit/ 

Total Parenteral Nutrition/Amino Acids/Dextrose/ Fat Emulsion Intravenous 1,800 

ml @  75 mls/hr TPN  CONT IV  Last administered on 5/29/20at 23:13;  Start 

5/29/20 at 22:00;  Stop 5/30/20 at 21:59;  Status DC


Potassium Chloride 80 meq/ Magnesium Sulfate 20 meq/ Multivitamins 10 

ml/Chromium/ Copper/Manganese/ Seleni/Zn 1 ml/ Insulin Human Regular 15 unit/ 

Total Parenteral Nutrition/Amino Acids/Dextrose/ Fat Emulsion Intravenous 1,800 

ml @  75 mls/hr TPN  CONT IV  Last administered on 5/30/20at 22:30;  Start 

5/30/20 at 22:00;  Stop 5/31/20 at 21:59;  Status DC


Potassium Chloride 80 meq/ Magnesium Sulfate 20 meq/ Multivitamins 10 

ml/Chromium/ Copper/Manganese/ Seleni/Zn 1 ml/ Insulin Human Regular 15 unit/ 

Total Parenteral Nutrition/Amino Acids/Dextrose/ Fat Emulsion Intravenous 1,800 

ml @  75 mls/hr TPN  CONT IV  Last administered on 5/31/20at 21:54;  Start 

5/31/20 at 22:00;  Stop 6/1/20 at 21:59;  Status DC


Potassium Chloride/Water 100 ml @  100 mls/hr 1X  ONCE IV  Last administered on 

6/1/20at 10:15;  Start 6/1/20 at 10:00;  Stop 6/1/20 at 10:59;  Status DC


Potassium Chloride 90 meq/ Magnesium Sulfate 20 meq/ Multivitamins 10 

ml/Chromium/ Copper/Manganese/ Seleni/Zn 1 ml/ Insulin Human Regular 20 unit/ 

Total Parenteral Nutrition/Amino Acids/Dextrose/ Fat Emulsion Intravenous 1,800 

ml @  75 mls/hr TPN  CONT IV  Last administered on 6/1/20at 22:28;  Start 6/1/20

at 22:00;  Stop 6/2/20 at 21:59;  Status DC


Potassium Chloride 90 meq/ Magnesium Sulfate 20 meq/ Multivitamins 10 

ml/Chromium/ Copper/Manganese/ Seleni/Zn 1 ml/ Insulin Human Regular 20 unit/ 

Total Parenteral Nutrition/Amino Acids/Dextrose/ Fat Emulsion Intravenous 1,800 

ml @  75 mls/hr TPN  CONT IV  Last administered on 6/2/20at 22:08;  Start 6/2/20

at 22:00;  Stop 6/3/20 at 21:59;  Status DC


Lorazepam (Ativan Inj) 0.25 mg PRN Q4HRS  PRN IVP ANXIETY / AGITATION Last 

administered on 6/3/20at 07:55;  Start 6/3/20 at 07:30


Potassium Chloride 90 meq/ Magnesium Sulfate 20 meq/ Multivitamins 10 

ml/Chromium/ Copper/Manganese/ Seleni/Zn 1 ml/ Insulin Human Regular 20 unit/ 

Total Parenteral Nutrition/Amino Acids/Dextrose/ Fat Emulsion Intravenous 1,800 

ml @  75 mls/hr TPN  CONT IV  Last administered on 6/3/20at 23:13;  Start 6/3/20

at 22:00;  Stop 6/4/20 at 21:59;  Status DC


Furosemide (Lasix) 40 mg DAILY IVP  Last administered on 6/5/20at 11:14;  Start 

6/3/20 at 13:30;  Stop 6/7/20 at 09:12;  Status DC


Fluoxetine HCl (PROzac) 20 mg QHS PEG  Last administered on 6/6/20at 21:47;  

Start 6/4/20 at 21:00


Fentanyl (Duragesic 50mcg/ Hr Patch) 1 patch Q72H TD  Last administered on 

6/4/20at 21:22;  Start 6/4/20 at 21:00


Potassium Chloride 40 meq/ Potassium Acetate 60 meq/Magnesium Sulfate 10 meq/ 

Multivitamins 10 ml/Chromium/ Copper/Manganese/ Seleni/Zn 1 ml/ Insulin Human 

Regular 20 unit/ Total Parenteral Nutrition/Amino Acids/Dextrose/ Fat Emulsion 

Intravenous 1,800 ml @  75 mls/hr TPN  CONT IV  Last administered on 6/5/20at 

00:03;  Start 6/4/20 at 22:00;  Stop 6/5/20 at 21:59;  Status DC


Potassium Acetate 80 meq/Magnesium Sulfate 5 meq/ Multivitamins 10 ml/Chromium/ 

Copper/Manganese/ Seleni/Zn 1 ml/ Insulin Human Regular 20 unit/ Total 

Parenteral Nutrition/Amino Acids/Dextrose/ Fat Emulsion Intravenous 1,920 ml @  

80 mls/hr TPN  CONT IV  Last administered on 6/5/20at 21:59;  Start 6/5/20 at 

22:00;  Stop 6/6/20 at 21:59;  Status DC


Potassium Acetate 60 meq/Magnesium Sulfate 5 meq/ Multivitamins 10 ml/Chromium/ 

Copper/Manganese/ Seleni/Zn 1 ml/ Insulin Human Regular 30 unit/ Total 

Parenteral Nutrition/Amino Acids/Dextrose/ Fat Emulsion Intravenous 1,920 ml @  

80 mls/hr TPN  CONT IV  Last administered on 6/6/20at 21:54;  Start 6/6/20 at 

22:00;  Stop 6/7/20 at 21:59;  Status DC


Norepinephrine Bitartrate 8 mg/ Dextrose 258 ml @  13.332 mls/ hr CONT  PRN IV 

PER PROTOCOL Last administered on 6/7/20at 21:46;  Start 6/7/20 at 06:30


Albumin Human 500 ml @  125 mls/hr 1X  ONCE IV  Last administered on 6/7/20at 

08:10;  Start 6/7/20 at 08:15;  Stop 6/7/20 at 12:14;  Status DC


Potassium Acetate 40 meq/Magnesium Sulfate 5 meq/ Multivitamins 10 ml/Chromium/ 

Copper/Manganese/ Seleni/Zn 1 ml/ Insulin Human Regular 30 unit/ Total 

Parenteral Nutrition/Amino Acids/Dextrose/ Fat Emulsion Intravenous 1,920 ml @  

80 mls/hr TPN  CONT IV  Last administered on 6/7/20at 22:23;  Start 6/7/20 at 

22:00;  Stop 6/8/20 at 21:59


Meropenem 1 gm/ Sodium Chloride 100 ml @  200 mls/hr Q8HRS IV ;  Start 6/7/20 at

14:00;  Status Cancel


Meropenem 1 gm/ Sodium Chloride 100 ml @  200 mls/hr Q8HRS IV  Last administered

on 6/7/20at 11:04;  Start 6/7/20 at 10:00;  Stop 6/7/20 at 13:00;  Status DC


Meropenem 1 gm/ Sodium Chloride 100 ml @  200 mls/hr Q12HR IV  Last administered

on 6/8/20at 07:48;  Start 6/7/20 at 21:00


Sodium Chloride 1,000 ml @  1,000 mls/hr 1X  ONCE IV  Last administered on 

6/7/20at 11:06;  Start 6/7/20 at 10:45;  Stop 6/7/20 at 11:44;  Status DC


Micafungin Sodium 100 mg/Dextrose 100 ml @  100 mls/hr Q24H IV  Last 

administered on 6/7/20at 14:12;  Start 6/7/20 at 11:00


Daptomycin 410 mg/ Sodium Chloride 50 ml @  100 mls/hr Q24H IV  Last 

administered on 6/7/20at 14:14;  Start 6/7/20 at 14:00


Midazolam HCl (Versed) 2 mg STK-MED ONCE .ROUTE ;  Start 6/7/20 at 14:47;  Stop 

6/7/20 at 14:48;  Status DC


Fentanyl Citrate (Fentanyl 2ml Vial) 100 mcg STK-MED ONCE .ROUTE ;  Start 6/7/20

at 14:47;  Stop 6/7/20 at 14:48;  Status DC


Flumazenil (Romazicon) 0.5 mg STK-MED ONCE IV ;  Start 6/7/20 at 14:48;  Stop 

6/7/20 at 14:48;  Status DC


Naloxone HCl (Narcan) 0.4 mg STK-MED ONCE .ROUTE ;  Start 6/7/20 at 14:48;  Stop

6/7/20 at 14:48;  Status DC


Lidocaine HCl (Lidocaine 1% 20ml Vial) 20 ml STK-MED ONCE .ROUTE ;  Start 6/7/20

at 14:48;  Stop 6/7/20 at 14:48;  Status DC


Midazolam HCl (Versed) 2 mg 1X  ONCE IV  Last administered on 6/7/20at 15:28;  

Start 6/7/20 at 15:00;  Stop 6/7/20 at 15:01;  Status DC


Fentanyl Citrate (Fentanyl 2ml Vial) 100 mcg 1X  ONCE IV  Last administered on 

6/7/20at 15:28;  Start 6/7/20 at 15:00;  Stop 6/7/20 at 15:01;  Status DC


Lidocaine HCl (Lidocaine 1% 20ml Vial) 20 ml 1X  ONCE INJ  Last administered on 

6/7/20at 15:30;  Start 6/7/20 at 15:00;  Stop 6/7/20 at 15:01;  Status DC


Sodium Chloride 1,000 ml @  100 mls/hr Q10H IV  Last administered on 6/8/20at 

02:31;  Start 6/7/20 at 20:00


Sodium Bicarbonate (Sodium Bicarb Adult 8.4% Syr) 50 meq 1X  ONCE IV  Last 

administered on 6/7/20at 21:47;  Start 6/7/20 at 22:00;  Stop 6/7/20 at 22:01;  

Status DC





Active Scripts


Active


Reported


Bisoprolol Fumarate 5 Mg Tablet 10 Mg PO DAILY


Vitals/I & O





Vital Sign - Last 24 Hours








 6/7/20 6/7/20 6/7/20 6/7/20





 09:00 09:48 10:00 10:05


 


Temp  100.2  100.2





  100.2  100.2


 


Pulse 130 150 130 150


 


Resp 26 28 26 28


 


B/P (MAP) 95/60 (72) 116/72 106/69 (81) 106/72


 


Pulse Ox 100  100 


 


O2 Delivery Venturi Mask  Venturi Mask 


 


O2 Flow Rate 15.0  15.0 


 


    





    





 6/7/20 6/7/20 6/7/20 6/7/20





 10:20 10:35 11:00 11:33


 


Temp 100.2 100.3  100.2





 100.2 100.3  100.2


 


Pulse 148 148 133 148


 


Resp 28 28 30 28


 


B/P (MAP) 102/68 105/58 113/66 (82) 102/53


 


Pulse Ox   100 


 


O2 Delivery   Venturi Mask 


 


O2 Flow Rate   15.0 


 


    





    





 6/7/20 6/7/20 6/7/20 6/7/20





 12:00 12:00 14:39 15:09


 


Temp  98.6  





  98.6  


 


Pulse  134  


 


Resp  30  


 


B/P (MAP)  119/62 (81)  


 


Pulse Ox  100 100 99


 


O2 Delivery Venturi Mask Venturi Mask Venturi Mask Venturi Mask


 


O2 Flow Rate 15.0 15.0 15.0 15.0


 


    





    





 6/7/20 6/7/20 6/7/20 6/7/20





 15:28 15:28 15:30 15:35


 


Pulse 150  150 148


 


Resp 26 25 26 26


 


B/P (MAP)   98/46 (63) 101/46 (64)


 


Pulse Ox 94  95 95


 


O2 Delivery Venturi Mask  Venturi Mask Venturi Mask


 


O2 Flow Rate 15.0  15.0 15.0





 6/7/20 6/7/20 6/7/20 6/7/20





 15:40 15:45 15:50 15:55


 


Pulse 148 146 147 146


 


Resp 28 28 28 28


 


B/P (MAP) 106/50 (68) 88/42 (57) 98/56 (70) 108/56 (73)


 


Pulse Ox 96 94 96 97


 


O2 Delivery Venturi Mask Venturi Mask Venturi Mask Venturi Mask


 


O2 Flow Rate 15.0 15.0 15.0 15.0





 6/7/20 6/7/20 6/7/20 6/7/20





 16:00 16:00 16:05 16:10


 


Pulse 146  147 147


 


Resp 28 28 28


 


B/P (MAP) 104/49 (67)  105/51 (69) 110/43 (65)


 


Pulse Ox 97  99 100


 


O2 Delivery Venturi Mask Venturi Mask Venturi Mask Venturi Mask


 


O2 Flow Rate 15.0 15.0 15.0 15.0





 6/7/20 6/7/20 6/7/20 6/7/20





 16:15 16:20 16:25 16:30


 


Pulse 147 146 145 145


 


Resp 28 28 28 26


 


B/P (MAP) 113/52 (72) 112/52 (72) 106/51 (69) 107/52 (70)


 


Pulse Ox 100 100 99 99


 


O2 Delivery Venturi Mask Venturi Mask Venturi Mask Venturi Mask


 


O2 Flow Rate 15.0 15.0 15.0 15.0





 6/7/20 6/7/20 6/7/20 6/7/20





 16:35 16:40 16:45 16:50


 


Pulse 144 144 145 143


 


Resp 26 24 26 25


 


B/P (MAP) 106/50 (68) 108/53 (71) 107/52 (70) 103/51 (68)


 


Pulse Ox 99 99 100 100


 


O2 Delivery Venturi Mask Venturi Mask Venturi Mask Venturi Mask


 


O2 Flow Rate 15.0 15.0 15.0 15.0





 6/7/20 6/7/20 6/7/20 6/7/20





 16:55 17:00 17:05 17:10


 


Pulse 143 141 141 140


 


Resp 25 24 24 25


 


B/P (MAP) 108/54 (72) 98/52 (67) 103/54 (70) 110/53 (72)


 


Pulse Ox 100 100 100 99


 


O2 Delivery Venturi Mask Venturi Mask Venturi Mask Venturi Mask


 


O2 Flow Rate 15.0 15.0 15.0 15.0





 6/7/20 6/7/20 6/7/20 6/7/20





 17:15 17:20 17:25 17:36


 


Pulse 139 138 136 136


 


Resp 25 25 26 26


 


B/P (MAP) 80/53 (62) 104/55 (71) 107/52 (70) 


 


Pulse Ox 99 100 96 99


 


O2 Delivery Venturi Mask Venturi Mask Venturi Mask Venturi Mask


 


O2 Flow Rate 15.0 15.0 15.0 15.0





 6/7/20 6/7/20 6/7/20 6/7/20





 19:00 19:32 20:00 20:00


 


Temp   98.8 





   98.8 


 


Pulse 126  124 


 


Resp 22  18 


 


B/P (MAP) 106/54 (71)  110/56 (74) 





   109/70 (83) 


 


Pulse Ox 99 99 100 


 


O2 Delivery Venturi Mask AVAPS ON V60 BiPAP/CPAP Bi-pap


 


    





    





 6/7/20 6/7/20 6/7/20 6/8/20





 21:00 22:00 23:00 00:00


 


Temp    98.4





    98.4


 


Pulse 114 88 98 72


 


Resp 18 18 18 20


 


B/P (MAP) 102/50 (67) 152/74 (100) 140/74 (96) 166/80 (108)





  136/77 (96)  164/89 (114)


 


Pulse Ox 100 100 100 100


 


O2 Delivery BiPAP/CPAP BiPAP/CPAP BiPAP/CPAP BiPAP/CPAP


 


    





    





 6/8/20 6/8/20 6/8/20 6/8/20





 00:00 00:16 00:52 01:00


 


Pulse    90


 


Resp   18 18


 


B/P (MAP)    138/72 (94)


 


Pulse Ox  99 100 100


 


O2 Delivery Bi-pap AVAPS ON V60 BiPAP/CPAP BiPAP/CPAP





 6/8/20 6/8/20 6/8/20 6/8/20





 02:00 02:10 03:00 04:00


 


Pulse 112  100 


 


Resp 18  18 


 


B/P (MAP) 128/54 (78)  134/70 (91) 


 


Pulse Ox 100 99 100 


 


O2 Delivery BiPAP/CPAP AVAPS ON V60 BiPAP/CPAP Bi-pap





 6/8/20 6/8/20 6/8/20 6/8/20





 04:00 04:31 05:00 05:50


 


Temp 98.8   





 98.8   


 


Pulse 93  90 


 


Resp 18  18 


 


B/P (MAP) 139/72 (94)  136/72 (93) 


 


Pulse Ox 100 99 100 99


 


O2 Delivery BiPAP/CPAP AVAPS ON V60 BiPAP/CPAP AVAPS ON V60


 


    





    





 6/8/20 6/8/20 6/8/20 6/8/20





 06:00 07:00 07:46 08:00


 


Temp    97.7





    97.7


 


Pulse 96 100  99


 


Resp 18 18  18


 


B/P (MAP) 124/64 (84) 102/54 (70)  104/56 (72)


 


Pulse Ox 100 100 99 100


 


O2 Delivery BiPAP/CPAP BiPAP/CPAP AVAPS ON V60 BiPAP/CPAP


 


    





    





 6/8/20 6/8/20  





 08:00 08:40  


 


O2 Delivery Bi-pap AVAPS ON V60  














Intake and Output   


 


 6/7/20 6/7/20 6/8/20





 15:00 23:00 07:00


 


Intake Total 1035 ml 8163 ml 1563 ml


 


Output Total 335 ml 945 ml 990 ml


 


Balance 700 ml 7218 ml 573 ml











Justicifation of Admission Dx:


Justifications for Admission:


Justification of Admission Dx:  Yes











CLEMENTINA PANTOJA MD            Jun 8, 2020 09:01 Patient/EMS

## 2020-06-08 NOTE — NUR
SS following up with discharge planning. SS reviewed pt chart and discussed with pt RN. Pt 
had decline over weekend and was unable to work with PT/OT. Pt had three drains placed on 
6/7/2020. Pt on IV Daptomycin, Micafungin, and TPN. Per RN, pt wore trach cap for one hour 
today. Pt remains full code per family's wishes. SS will continue to follow for discharge 
planning.

## 2020-06-08 NOTE — NUR
Pharmacy TPN Dosing Note



S: JESENIA BEAN is a 49 year old F Currently receiving Central Continuous TPN started 
03/18/20



B:Pertinent PMH: 

Necrotizing pancreatitis

Height: 5 feet, 8 inches

Weight: 75.5 kg



Current diet: NPO 



LABS:

Sodium:    151 

Potassium: 4.1 

Chloride:  118 

Calcium:   9.9 

Corrected Calcium: 11.90 

Magnesium: 2.4 

CO2:       25 

SCr:       1.9 

Glucose:   165 

Albumin:   1.5 

AST:       15 

ALT:       12 



TPN FORMULA:

TPN TYPE:  Central Continuous

AMINO ACIDS:         75 gm

DEXTROSE:            250 gm

LIPIDS:              20 gm

SODIUM CHLORIDE:     - mEq

SODIUM ACETATE:      - mEq

SODIUM PHOSPHATE:    - mmol

POTASSIUM CHLORIDE:  - mEq

POTASSIUM ACETATE:   40 mEq

POTASSIUM PHOSPHATE: - mmol

MAGNESIUM:           5 mEq

CALCIUM:             - mEq

INSULIN:             30 units

MULTIPLE VITAMIN:    10 ml

TRACE ELEMENTS:      1 ml(s)



  

 .





R: Continue TPN with no changes.

Will monitor electrolytes, glucose, and tolerance to TPN.



 YENIFER STREETER [facility], 06/08/20 1326

## 2020-06-08 NOTE — PDOC
Infectious Disease Note


Subjective:


Subjective


Patient remains lethargic


Received 4 units of packed RBC yesterday


Underwent IR drain placement, bloodstained drainage


Off Levophed this morning


T-max 100.3





Vital Signs:


Vital Signs





Vital Signs








  Date Time  Temp Pulse Resp B/P (MAP) Pulse Ox O2 Delivery O2 Flow Rate FiO2


 


6/8/20 07:00  100 18 102/54 (70) 100 BiPAP/CPAP  


 


6/8/20 04:00 98.8       





 98.8       


 


6/7/20 17:36       15.0 











Physical Exam:


PHYSICAL EXAM


GENERAL:lethargic, arousable though extremely weak


HEENT: NGT in place. Oral mucosa dry


NECK:  Tracheostomy 


LUNGS: Diminished aeration bases, no accessory muscle use 


HEART:  S1, S2, tachy, regular 


ABDOMEN: Mild distention, bowel sounds present, soft, grimaces to palpation 


Right lateral side drainage bag, 3 drains with bloodstained drainage


: [place] ( 6/ 7)


EXTREMITIES: Trace edema .no  cyanosis 


SKIN: no signs of gen rash 


CNS: Lethargic 


LUE-PICC (5/29) without signs of complications





Medications:


Inpatient Meds:





Current Medications








 Medications


  (Trade)  Dose


 Ordered  Sig/Yee  Start Time


 Stop Time Status Last Admin


Dose Admin


 


 Acetaminophen


  (Tylenol Supp)  650 mg  PRN Q6HRS  PRN  3/24/20 10:30


    6/7/20 14:41





 


 Acetaminophen


  (Tylenol)  650 mg  PRN Q6HRS  PRN  3/21/20 03:36


 5/13/20 10:25 DC 4/16/20 19:56





 


 Albumin Human  500 ml @ 


 125 mls/hr  1X  ONCE  6/7/20 08:15


 6/7/20 12:14 DC 6/7/20 08:10





 


 Albuterol Sulfate


  (Ventolin Neb


 Soln)  2.5 mg  1X  ONCE  3/17/20 22:30


 3/17/20 22:31 DC 3/18/20 00:56





 


 Alteplase,


 Recombinant


  (Cathflo For


 Central Catheter


 Clearance)  1 mg  1X  ONCE  4/24/20 10:45


 4/24/20 10:46 DC 4/24/20 11:44





 


 Amino Acids/


 Glycerin/


 Electrolytes  1,000 ml @ 


 75 mls/hr  [id number]  4/20/20 21:15


   UNV  





 


 Artificial Tears


  (Artificial


 Tears)  1 drop  PRN Q15MIN  PRN  4/29/20 05:30


    5/29/20 10:08





 


 Atenolol


  (Tenormin)  100 mg  DAILY  3/17/20 09:00


 3/16/20 20:08 DC  





 


 Atropine Sulfate


  (ATROPINE 0.5mg


 SYRINGE)  0.5 mg  PRN Q5MIN  PRN  4/2/20 08:15


     





 


 Barium Sulfate


  (Varibar Thin


 Liquid Apple)  148 gm  1X  ONCE  5/26/20 11:45


 5/26/20 11:49 DC  





 


 Benzocaine


  (Hurricaine One)  1 spray  1X  ONCE  3/20/20 14:30


 3/20/20 14:31 DC 3/20/20 16:38





 


 Bisacodyl


  (Dulcolax Supp)  10 mg  STK-MED ONCE  4/27/20 10:59


 4/27/20 10:59 DC  





 


 Bumetanide


  (Bumex)  2 mg  DAILY  5/8/20 10:00


 5/18/20 17:15 DC 5/18/20 08:07





 


 Bupivacaine HCl/


 Epinephrine Bitart


  (Sensorcain-Epi


 0.5%-1:[id number] Mpf)  30 ml  STK-MED ONCE  4/27/20 10:58


 4/27/20 10:58 DC 4/27/20 12:01





 


 Calcium Carbonate/


 Glycine


  (Tums)  500 mg  PRN [id number]  PRN  3/18/20 17:45


 5/13/20 10:25 DC  





 


 Calcium Chloride


 1000 mg/Sodium


 Chloride  110 ml @ 


 220 mls/hr  1X  ONCE  3/17/20 22:30


 3/17/20 22:59 DC 3/17/20 22:11





 


 Calcium Chloride


 3000 mg/Sodium


 Chloride  1,030 ml @ 


 50 mls/hr  [id number]  3/19/20 08:00


 3/21/20 15:23 DC 3/21/20 02:17





 


 Calcium Gluconate


  (Calcium


 Gluconate)  2,000 mg  1X  ONCE  3/19/20 02:15


 3/19/20 02:16 DC 3/19/20 02:19





 


 Calcium Gluconate


 1000 mg/Sodium


 Chloride  110 ml @ 


 220 mls/hr  1X  ONCE  3/18/20 03:30


 3/18/20 03:59 DC 3/18/20 03:21





 


 Calcium Gluconate


 2000 mg/Sodium


 Chloride  120 ml @ 


 220 mls/hr  1X  ONCE  3/18/20 07:30


 3/18/20 08:02 DC 3/18/20 09:05





 


 Cefepime HCl


  (Maxipime)  2 gm  Q12HR  3/25/20 09:00


 4/8/20 09:58 DC 4/7/20 20:56





 


 Cellulose


  (Surgicel


 Fibrillar 1x2)  1 each  STK-MED ONCE  4/6/20 11:00


 4/6/20 11:01 DC  





 


 Cellulose


  (Surgicel


 Hemostat 2x14)  1 each  STK-MED ONCE  4/27/20 10:58


 4/27/20 10:59 DC  





 


 Cellulose


  (Surgicel


 Hemostat 4x8)  1 each  STK-MED ONCE  4/27/20 10:58


 4/27/20 10:59 DC  





 


 Cyclobenzaprine


 HCl


  (Flexeril)  10 mg  PRN Q6HRS  PRN  4/30/20 10:45


     





 


 Daptomycin 410 mg/


 Sodium Chloride  50 ml @ 


 100 mls/hr  Q24H  6/7/20 14:00


    6/7/20 14:14





 


 Daptomycin 430 mg/


 Sodium Chloride  50 ml @ 


 100 mls/hr  Q24H  4/25/20 13:00


 4/30/20 20:58 DC 4/30/20 13:00





 


 Daptomycin 450 mg/


 Sodium Chloride  50 ml @ 


 100 mls/hr  Q24H  5/17/20 09:00


 5/21/20 08:30 DC 5/20/20 09:25





 


 Daptomycin 485 mg/


 Sodium Chloride  50 ml @ 


 100 mls/hr  Q24H  5/4/20 11:00


 5/12/20 07:44 DC 5/11/20 13:10





 


 Daptomycin 500 mg/


 Sodium Chloride  50 ml @ 


 100 mls/hr  Q48H  3/25/20 08:30


 4/10/20 10:07 DC 4/10/20 09:57





 


 Dexamethasone


 Sodium Phosphate


  (Decadron)  4 mg  STK-MED ONCE  4/27/20 10:56


 4/27/20 10:57 DC  





 


 Dexmedetomidine


 HCl 400 mcg/


 Sodium Chloride  100 ml @ 0


 mls/hr  CONT  PRN  4/2/20 08:15


 5/30/20 18:31 DC 5/30/20 12:57





 


 Dextrose


  (Dextrose


 50%-Water Syringe)  12.5 gm  PRN Q15MIN  PRN  3/16/20 09:30


     





 


 Digoxin


  (Lanoxin)  125 mcg  1X  ONCE  3/19/20 18:00


 3/19/20 18:01 DC 3/19/20 17:10





 


 Diphenhydramine


 HCl


  (Benadryl)  25 mg  1X PRN  PRN  4/24/20 15:45


 4/25/20 15:44 DC  





 


 Duloxetine HCl


  (Cymbalta)  30 mg  DAILY  5/10/20 14:00


 5/13/20 10:25 DC 5/11/20 09:48





 


 Enoxaparin Sodium


  (Lovenox 100mg


 Syringe)  100 mg  Q12HR  4/21/20 21:00


   UNV  





 


 Enoxaparin Sodium


  (Lovenox 40mg


 Syringe)  40 mg  Q24H  5/17/20 17:00


 6/7/20 06:50 DC 6/5/20 17:44





 


 Etomidate


  (Amidate)  8 mg  1X  ONCE  3/23/20 08:30


 3/23/20 08:31 DC 3/23/20 08:33





 


 Fentanyl


  (Duragesic 50mcg/


 Hr Patch)  1 patch  Q72H  6/4/20 21:00


    6/4/20 21:22





 


 Fentanyl Citrate


  (Fentanyl 2ml


 Vial)  100 mcg  1X  ONCE  6/7/20 15:00


 6/7/20 15:01 DC 6/7/20 15:28





 


 Fentanyl Citrate


  (Fentanyl 5ml


 Vial)  250 mcg  1X  ONCE  5/8/20 09:15


 5/8/20 09:16 DC 5/8/20 09:30





 


 Flumazenil


  (Romazicon)  0.5 mg  STK-MED ONCE  6/7/20 14:48


 6/7/20 14:48 DC  





 


 Fluoxetine HCl


  (PROzac)  20 mg  QHS  6/4/20 21:00


    6/6/20 21:47





 


 Furosemide


  (Lasix)  40 mg  DAILY  6/3/20 13:30


 6/7/20 09:12 DC 6/5/20 11:14





 


 Haloperidol


 Lactate


  (Haldol Inj)  3 mg  1X  ONCE  5/4/20 14:30


 5/4/20 14:31 DC 5/4/20 14:37





 


 Heparin Sodium


  (Porcine)


  (Hep Lock Adult)  500 unit  STK-MED ONCE  4/7/20 09:29


 4/7/20 09:30 DC  





 


 Heparin Sodium


  (Porcine)


  (Heparin Sodium)  5,000 unit  Q12HR  4/27/20 21:00


 5/7/20 09:59 DC 5/6/20 20:57





 


 Heparin Sodium


  (Porcine) 1000


 unit/Sodium


 Chloride  1,001 ml @ 


 1,001 mls/hr  1X  ONCE  4/27/20 12:00


 4/27/20 12:59 DC  





 


 Hydromorphone HCl


  (Dilaudid


 Standard PCA)  12 mg  STK-MED ONCE  5/1/20 15:50


 5/12/20 11:24 DC  





 


 Hydromorphone HCl


  (Dilaudid)  1 mg  PRN Q4HRS  PRN  5/4/20 19:00


 5/18/20 17:10 DC 5/18/20 06:25





 


 Info


  (CONTRAST GIVEN


 -- Rx MONITORING)  1 each  PRN DAILY  PRN  3/30/20 11:45


 4/1/20 11:44 DC  





 


 Info


  (Icu Electrolyte


 Protocol)  1 ea  CONT PRN  PRN  3/29/20 13:15


     





 


 Info


  (PHARMACY


 MONITORING -- do


 not chart)  1 each  PRN DAILY  PRN  4/24/20 15:45


 5/26/20 14:14 DC  





 


 Info


  (Tpn Per


 Pharmacy)  1 each  PRN DAILY  PRN  3/18/20 12:30


   UNV  





 


 Insulin Human


 Lispro


  (HumaLOG)  0-9 UNITS  Q6HRS  3/16/20 09:30


    6/8/20 00:50





 


 Insulin Human


 Regular


  (HumuLIN R VIAL)  5 unit  1X  ONCE  3/17/20 22:30


 3/17/20 22:31 DC 3/17/20 22:14





 


 Iohexol


  (Omnipaque 240


 Mg/ml)  30 ml  1X  ONCE  3/30/20 11:30


 3/30/20 11:33 DC 3/30/20 11:30





 


 Iohexol


  (Omnipaque 300


 Mg/ml)  50 ml  STK-MED ONCE  4/27/20 10:58


 4/27/20 10:59 DC  





 


 Iohexol


  (Omnipaque 350


 Mg/ml)  90 ml  1X  ONCE  3/16/20 03:30


 3/16/20 03:31 DC 3/16/20 03:25





 


 Ketorolac


 Tromethamine


  (Toradol 30mg


 Vial)  30 mg  1X  ONCE  3/16/20 03:00


 3/16/20 03:01 DC 3/16/20 02:54





 


 Lidocaine HCl


  (Buffered


 Lidocaine 1%)  6 ml  1X  ONCE  5/12/20 14:15


 5/12/20 14:16 DC 5/12/20 14:15





 


 Lidocaine HCl


  (Glydo


  (Lidocaine) Jelly)  1 thomas  1X  ONCE  3/20/20 14:30


 3/20/20 14:31 DC 3/20/20 16:38





 


 Lidocaine HCl


  (Lidocaine 1%


 20ml Vial)  20 ml  1X  ONCE  6/7/20 15:00


 6/7/20 15:01 DC 6/7/20 15:30





 


 Lidocaine HCl


  (Xylocaine-Mpf


 1% 2ml Vial)  2 ml  PRN 1X  PRN  4/27/20 07:00


 4/28/20 06:59 DC  





 


 Linezolid/Dextrose  300 ml @ 


 300 mls/hr  Q12HR  5/17/20 09:00


 5/20/20 08:11 DC 5/19/20 21:08





 


 Lorazepam


  (Ativan Inj)  0.25 mg  PRN Q4HRS  PRN  6/3/20 07:30


    6/3/20 07:55





 


 Magnesium Sulfate  50 ml @ 25


 mls/hr  1X  ONCE  5/10/20 09:00


 5/10/20 10:59 DC 5/10/20 10:44





 


 Meropenem 1 gm/


 Sodium Chloride  100 ml @ 


 200 mls/hr  Q12HR  6/7/20 21:00


    6/7/20 21:46





 


 Meropenem 500 mg/


 Sodium Chloride  50 ml @ 


 100 mls/hr  Q6HRS  5/25/20 18:00


 5/27/20 09:59 DC 5/27/20 06:02





 


 Metoclopramide HCl


  (Reglan Vial)  10 mg  PRN Q3HRS  PRN  5/9/20 16:45


    5/14/20 04:25





 


 Metoprolol


 Tartrate


  (Lopressor Vial)  5 mg  1X  ONCE  5/17/20 15:15


 5/17/20 15:16 DC 5/17/20 15:31





 


 Metronidazole  100 ml @ 


 100 mls/hr  Q8HRS  4/14/20 10:00


 4/21/20 08:10 DC 4/21/20 06:04





 


 Micafungin Sodium


 100 mg/Dextrose  100 ml @ 


 100 mls/hr  Q24H  6/7/20 11:00


    6/7/20 14:12





 


 Midazolam HCl


  (Versed)  2 mg  1X  ONCE  6/7/20 15:00


 6/7/20 15:01 DC 6/7/20 15:28





 


 Midazolam HCl 100


 mg/Sodium Chloride  100 ml @ 7


 mls/hr  CONT  PRN  3/28/20 16:00


 6/3/20 14:38 DC 4/8/20 15:35





 


 Midazolam HCl 50


 mg/Sodium Chloride  50 ml @ 0


 mls/hr  CONT  PRN  3/23/20 08:15


 3/28/20 15:59 DC 3/26/20 22:39





 


 Morphine Sulfate


  (Morphine


 Sulfate)  2 mg  PRN Q2HR  PRN  3/16/20 05:00


 3/17/20 14:15 DC 3/17/20 12:26





 


 Multi-Ingred


 Cream/Lotion/Oil/


 Oint


  (Artificial


 Tears Eye


 Ointment)  1 thomas  PRN Q1HR  PRN  3/25/20 17:30


 6/3/20 14:39 DC 4/13/20 08:19





 


 Naloxone HCl


  (Narcan)  0.4 mg  STK-MED ONCE  6/7/20 14:48


 6/7/20 14:48 DC  





 


 Norepinephrine


 Bitartrate 8 mg/


 Dextrose  258 ml @ 


 13.332 mls/


 hr  CONT  PRN  6/7/20 06:30


    6/7/20 21:46





 


 Ondansetron HCl


  (Zofran)  4 mg  STK-MED ONCE  4/27/20 10:56


 4/27/20 10:57 DC  





 


 Pantoprazole


 Sodium


  (PROTONIX VIAL


 for IV PUSH)  40 mg  DAILYAC  3/16/20 11:30


    6/7/20 11:01





 


 Phenylephrine HCl


  (PHENYLEPHRINE


 in 0.9% NACL PF)  1 mg  STK-MED ONCE  4/27/20 12:34


 4/27/20 12:34 DC  





 


 Piperacillin Sod/


 Tazobactam Sod


 3.375 gm/Sodium


 Chloride  50 ml @ 


 100 mls/hr  Q6HRS  5/27/20 12:00


 6/4/20 07:26 DC 6/4/20 06:10





 


 Piperacillin Sod/


 Tazobactam Sod


 4.5 gm/Sodium


 Chloride  100 ml @ 


 200 mls/hr  1X  ONCE  3/16/20 06:00


 3/16/20 06:29 DC 3/16/20 05:44





 


 Potassium


 Chloride 110 meq/


 Magnesium Sulfate


 20 meq/


 Multivitamins 10


 ml/Chromium/


 Copper/Manganese/


 Seleni/Zn 1 ml/


 Insulin Human


 Regular 15 unit/


 Total Parenteral


 Nutrition/Amino


 Acids/Dextrose/


 Fat Emulsion


 Intravenous  1,800 ml @ 


 75 mls/hr  TPN  CONT  5/24/20 22:00


 5/25/20 21:59 DC 5/24/20 22:48





 


 Potassium


 Chloride 15 meq/


 Bicarbonate


 Dialysis Soln w/


 out KCl  5,007.5 ml


  @ 1,000 mls/


 hr  Q5H1M  3/29/20 20:00


 4/2/20 13:08 DC 4/1/20 18:14





 


 Potassium


 Chloride 20 meq/


 Bicarbonate


 Dialysis Soln w/


 out KCl  5,010 ml @ 


 1,000 mls/hr  Q5H1M  3/25/20 16:00


 3/29/20 19:59 DC 3/29/20 14:54





 


 Potassium


 Chloride 40 meq/


 Potassium Acetate


 60 meq/Magnesium


 Sulfate 10 meq/


 Multivitamins 10


 ml/Chromium/


 Copper/Manganese/


 Seleni/Zn 1 ml/


 Insulin Human


 Regular 20 unit/


 Total Parenteral


 Nutrition/Amino


 Acids/Dextrose/


 Fat Emulsion


 Intravenous  1,800 ml @ 


 75 mls/hr  TPN  CONT  6/4/20 22:00


 6/5/20 21:59 DC 6/5/20 00:03





 


 Potassium


 Chloride 70 meq/


 Magnesium Sulfate


 20 meq/


 Multivitamins 10


 ml/Chromium/


 Copper/Manganese/


 Seleni/Zn 1 ml/


 Insulin Human


 Regular 15 unit/


 Total Parenteral


 Nutrition/Amino


 Acids/Dextrose/


 Fat Emulsion


 Intravenous  1,800 ml @ 


 75 mls/hr  TPN  CONT  5/29/20 22:00


 5/30/20 21:59 DC 5/29/20 23:13





 


 Potassium


 Chloride 75 meq/


 Magnesium Sulfate


 15 meq/


 Multivitamins 10


 ml/Chromium/


 Copper/Manganese/


 Seleni/Zn 0.5 ml/


 Insulin Human


 Regular 15 unit/


 Total Parenteral


 Nutrition/Amino


 Acids/Dextrose/


 Fat Emulsion


 Intravenous  1,920 ml @ 


 80 mls/hr  TPN  CONT  5/9/20 22:00


 5/10/20 21:59 DC 5/9/20 22:41





 


 Potassium


 Chloride 75 meq/


 Magnesium Sulfate


 15 meq/Calcium


 Gluconate 8 meq/


 Multivitamins 10


 ml/Chromium/


 Copper/Manganese/


 Seleni/Zn 0.5 ml/


 Insulin Human


 Regular 15 unit/


 Total Parenteral


 Nutrition/Amino


 Acids/Dextrose/


 Fat Emulsion


 Intravenous  1,920 ml @ 


 80 mls/hr  TPN  CONT  5/7/20 22:00


 5/8/20 21:59 DC 5/7/20 22:28





 


 Potassium


 Chloride 75 meq/


 Magnesium Sulfate


 15 meq/Calcium


 Gluconate 8 meq/


 Multivitamins 10


 ml/Chromium/


 Copper/Manganese/


 Seleni/Zn 0.5 ml/


 Insulin Human


 Regular 20 unit/


 Total Parenteral


 Nutrition/Amino


 Acids/Dextrose/


 Fat Emulsion


 Intravenous  1,920 ml @ 


 80 mls/hr  TPN  CONT  5/6/20 22:00


 5/7/20 21:59 DC 5/6/20 22:00





 


 Potassium


 Chloride 75 meq/


 Magnesium Sulfate


 15 meq/Calcium


 Gluconate 8 meq/


 Multivitamins 10


 ml/Chromium/


 Copper/Manganese/


 Seleni/Zn 0.5 ml/


 Insulin Human


 Regular 25 unit/


 Total Parenteral


 Nutrition/Amino


 Acids/Dextrose/


 Fat Emulsion


 Intravenous  1,920 ml @ 


 80 mls/hr  TPN  CONT  5/4/20 22:00


 5/5/20 21:59 DC 5/4/20 23:08





 


 Potassium


 Chloride 75 meq/


 Magnesium Sulfate


 20 meq/Calcium


 Gluconate 10 meq/


 Multivitamins 10


 ml/Chromium/


 Copper/Manganese/


 Seleni/Zn 0.5 ml/


 Insulin Human


 Regular 25 unit/


 Total Parenteral


 Nutrition/Amino


 Acids/Dextrose/


 Fat Emulsion


 Intravenous  1,920 ml @ 


 80 mls/hr  TPN  CONT  5/3/20 22:00


 5/4/20 21:59 DC 5/3/20 22:04





 


 Potassium


 Chloride 75 meq/


 Magnesium Sulfate


 20 meq/Calcium


 Gluconate 10 meq/


 Multivitamins 10


 ml/Chromium/


 Copper/Manganese/


 Seleni/Zn 0.5 ml/


 Insulin Human


 Regular 30 unit/


 Total Parenteral


 Nutrition/Amino


 Acids/Dextrose/


 Fat Emulsion


 Intravenous  1,920 ml @ 


 80 mls/hr  TPN  CONT  5/2/20 22:00


 5/3/20 22:00 DC 5/2/20 21:51





 


 Potassium


 Chloride 80 meq/


 Magnesium Sulfate


 20 meq/


 Multivitamins 10


 ml/Chromium/


 Copper/Manganese/


 Seleni/Zn 0.5 ml/


 Insulin Human


 Regular 15 unit/


 Total Parenteral


 Nutrition/Amino


 Acids/Dextrose/


 Fat Emulsion


 Intravenous  1,920 ml @ 


 80 mls/hr  TPN  CONT  5/12/20 22:00


 5/13/20 21:59 DC 5/12/20 21:40





 


 Potassium


 Chloride 80 meq/


 Magnesium Sulfate


 20 meq/


 Multivitamins 10


 ml/Chromium/


 Copper/Manganese/


 Seleni/Zn 1 ml/


 Insulin Human


 Regular 15 unit/


 Total Parenteral


 Nutrition/Amino


 Acids/Dextrose/


 Fat Emulsion


 Intravenous  1,800 ml @ 


 75 mls/hr  TPN  CONT  5/31/20 22:00


 6/1/20 21:59 DC 5/31/20 21:54





 


 Potassium


 Chloride 90 meq/


 Magnesium Sulfate


 20 meq/


 Multivitamins 10


 ml/Chromium/


 Copper/Manganese/


 Seleni/Zn 1 ml/


 Insulin Human


 Regular 15 unit/


 Total Parenteral


 Nutrition/Amino


 Acids/Dextrose/


 Fat Emulsion


 Intravenous  1,800 ml @ 


 75 mls/hr  TPN  CONT  5/20/20 22:00


 5/21/20 21:59 DC 5/20/20 22:28





 


 Potassium


 Chloride 90 meq/


 Magnesium Sulfate


 20 meq/


 Multivitamins 10


 ml/Chromium/


 Copper/Manganese/


 Seleni/Zn 1 ml/


 Insulin Human


 Regular 20 unit/


 Total Parenteral


 Nutrition/Amino


 Acids/Dextrose/


 Fat Emulsion


 Intravenous  1,800 ml @ 


 75 mls/hr  TPN  CONT  6/3/20 22:00


 6/4/20 21:59 DC 6/3/20 23:13





 


 Potassium


 Chloride/Water  100 ml @ 


 100 mls/hr  1X  ONCE  6/1/20 10:00


 6/1/20 10:59 DC 6/1/20 10:15





 


 Potassium


 Phosphate 20 mmol/


 Sodium Chloride  106.6667


 ml @ 


 51.667 m...  1X  ONCE  3/25/20 13:00


 3/25/20 15:03 DC 3/25/20 12:51





 


 Potassium Acetate


 30 meq/Magnesium


 Sulfate 20 meq/


 Calcium Gluconate


 10 meq/


 Multivitamins 10


 ml/Chromium/


 Copper/Manganese/


 Seleni/Zn 0.5 ml/


 Insulin Human


 Regular 30 unit/


 Potassium


 Chloride 30 meq/


 Total Parenteral


 Nutrition/Amino


 Acids/Dextrose/


 Fat Emulsion


 Intravenous  1,920 ml @ 


 80 mls/hr  TPN  CONT  5/1/20 22:00


 5/2/20 21:59 DC 5/1/20 22:34





 


 Potassium Acetate


 40 meq/Magnesium


 Sulfate 5 meq/


 Multivitamins 10


 ml/Chromium/


 Copper/Manganese/


 Seleni/Zn 1 ml/


 Insulin Human


 Regular 30 unit/


 Total Parenteral


 Nutrition/Amino


 Acids/Dextrose/


 Fat Emulsion


 Intravenous  1,920 ml @ 


 80 mls/hr  TPN  CONT  6/7/20 22:00


 6/8/20 21:59  6/7/20 22:23





 


 Potassium Acetate


 55 meq/Magnesium


 Sulfate 20 meq/


 Calcium Gluconate


 10 meq/


 Multivitamins 10


 ml/Chromium/


 Copper/Manganese/


 Seleni/Zn 0.5 ml/


 Insulin Human


 Regular 30 unit/


 Total Parenteral


 Nutrition/Amino


 Acids/Dextrose/


 Fat Emulsion


 Intravenous  1,920 ml @ 


 80 mls/hr  TPN  CONT  4/30/20 22:00


 5/1/20 21:59 DC 5/1/20 01:00





 


 Potassium Acetate


 55 meq/Magnesium


 Sulfate 20 meq/


 Calcium Gluconate


 10 meq/


 Multivitamins 10


 ml/Chromium/


 Copper/Manganese/


 Seleni/Zn 0.5 ml/


 Insulin Human


 Regular 35 unit/


 Total Parenteral


 Nutrition/Amino


 Acids/Dextrose/


 Fat Emulsion


 Intravenous  1,920 ml @ 


 80 mls/hr  TPN  CONT  4/28/20 22:00


 4/29/20 21:59 DC 4/28/20 22:02





 


 Potassium Acetate


 60 meq/Magnesium


 Sulfate 5 meq/


 Multivitamins 10


 ml/Chromium/


 Copper/Manganese/


 Seleni/Zn 1 ml/


 Insulin Human


 Regular 30 unit/


 Total Parenteral


 Nutrition/Amino


 Acids/Dextrose/


 Fat Emulsion


 Intravenous  1,920 ml @ 


 80 mls/hr  TPN  CONT  6/6/20 22:00


 6/7/20 21:59 DC 6/6/20 21:54





 


 Potassium Acetate


 65 meq/Magnesium


 Sulfate 20 meq/


 Calcium Gluconate


 10 meq/


 Multivitamins 10


 ml/Chromium/


 Copper/Manganese/


 Seleni/Zn 0.5 ml/


 Insulin Human


 Regular 30 unit/


 Total Parenteral


 Nutrition/Amino


 Acids/Dextrose/


 Fat Emulsion


 Intravenous  1,920 ml @ 


 80 mls/hr  TPN  CONT  4/29/20 22:00


 4/30/20 21:59 DC 4/29/20 22:22





 


 Potassium Acetate


 80 meq/Magnesium


 Sulfate 5 meq/


 Multivitamins 10


 ml/Chromium/


 Copper/Manganese/


 Seleni/Zn 1 ml/


 Insulin Human


 Regular 20 unit/


 Total Parenteral


 Nutrition/Amino


 Acids/Dextrose/


 Fat Emulsion


 Intravenous  1,920 ml @ 


 80 mls/hr  TPN  CONT  6/5/20 22:00


 6/6/20 21:59 DC 6/5/20 21:59





 


 Prochlorperazine


 Edisylate


  (Compazine)  5 mg  PACU PRN  PRN  4/27/20 07:00


 4/28/20 06:59 DC  





 


 Propofol  20 ml @ As


 Directed  STK-MED ONCE  4/27/20 12:26


 4/27/20 12:27 DC  





 


 Ringer's Solution  1,000 ml @ 


 30 mls/hr  Q24H  4/27/20 07:00


 4/27/20 18:59 DC  





 


 Rocuronium Bromide


  (Zemuron)  50 mg  STK-MED ONCE  4/27/20 10:56


 4/27/20 10:57 DC  





 


 Saliva Substitute


  (Biotene


 Moisturizing


 Mouth)  2 spray  PRN Q15MIN  PRN  5/21/20 11:00


     





 


 Sevoflurane


  (Ultane)  60 ml  STK-MED ONCE  4/27/20 12:26


 4/27/20 12:27 DC  





 


 Sodium


 Bicarbonate 50


 meq/Sodium


 Chloride  1,050 ml @ 


 75 mls/hr  Q14H  3/18/20 07:30


 3/23/20 10:28 DC 3/22/20 21:10





 


 Sodium Acetate 50


 meq/Potassium


 Acetate 55 meq/


 Magnesium Sulfate


 20 meq/Calcium


 Gluconate 10 meq/


 Multivitamins 10


 ml/Chromium/


 Copper/Manganese/


 Seleni/Zn 0.5 ml/


 Insulin Human


 Regular 35 unit/


 Total Parenteral


 Nutrition/Amino


 Acids/Dextrose/


 Fat Emulsion


 Intravenous  1,800 ml @ 


 75 mls/hr  TPN  CONT  4/25/20 22:00


 4/26/20 21:59 DC 4/25/20 22:03





 


 Sodium Bicarbonate


  (Sodium Bicarb


 Adult 8.4% Syr)  50 meq  1X  ONCE  6/7/20 22:00


 6/7/20 22:01 DC 6/7/20 21:47





 


 Sodium Chloride  1,000 ml @ 


 100 mls/hr  Q10H  6/7/20 20:00


    6/8/20 02:31





 


 Sodium Chloride


  (Normal Saline


 Flush)  3 ml  QSHIFT  PRN  4/27/20 13:45


     





 


 Sodium Chloride


 90 meq/Calcium


 Gluconate 10 meq/


 Multivitamins 10


 ml/Chromium/


 Copper/Manganese/


 Seleni/Zn 0.5 ml/


 Total Parenteral


 Nutrition/Amino


 Acids/Dextrose/


 Fat Emulsion


 Intravenous  1,512 ml @ 


 63 mls/hr  TPN  CONT  3/18/20 22:00


 3/19/20 21:59 DC 3/18/20 22:06





 


 Sodium Chloride


 90 meq/Calcium


 Gluconate 10 meq/


 Multivitamins 10


 ml/Chromium/


 Copper/Manganese/


 Seleni/Zn 1 ml/


 Total Parenteral


 Nutrition/Amino


 Acids/Dextrose/


 Fat Emulsion


 Intravenous  55.005 ml


  @ 2.292


 mls/hr  TPN  CONT  3/18/20 22:00


 3/18/20 12:33 DC  





 


 Sodium Chloride


 90 meq/Magnesium


 Sulfate 10 meq/


 Calcium Gluconate


 20 meq/


 Multivitamins 10


 ml/Chromium/


 Copper/Manganese/


 Seleni/Zn 0.5 ml/


 Total Parenteral


 Nutrition/Amino


 Acids/Dextrose/


 Fat Emulsion


 Intravenous  1,512 ml @ 


 63 mls/hr  TPN  CONT  3/19/20 22:00


 3/20/20 21:59 DC 3/19/20 22:25





 


 Sodium Chloride


 90 meq/Magnesium


 Sulfate 12 meq/


 Calcium Gluconate


 15 meq/


 Multivitamins 10


 ml/Chromium/


 Copper/Manganese/


 Seleni/Zn 0.5 ml/


 Insulin Human


 Regular 25 unit/


 Total Parenteral


 Nutrition/Amino


 Acids/Dextrose/


 Fat Emulsion


 Intravenous  1,400 ml @ 


 58.333 mls/


 hr  TPN  CONT  4/8/20 22:00


 4/9/20 21:59 DC 4/8/20 21:41





 


 Sodium Chloride


 90 meq/Potassium


 Chloride 15 meq/


 Magnesium Sulfate


 12 meq/Calcium


 Gluconate 15 meq/


 Multivitamins 10


 ml/Chromium/


 Copper/Manganese/


 Seleni/Zn 0.5 ml/


 Insulin Human


 Regular 25 unit/


 Total Parenteral


 Nutrition/Amino


 Acids/Dextrose/


 Fat Emulsion


 Intravenous  1,400 ml @ 


 58.333 mls/


 hr  TPN  CONT  4/7/20 22:00


 4/8/20 21:59 DC 4/7/20 22:13





 


 Sodium Chloride


 90 meq/Potassium


 Chloride 15 meq/


 Potassium


 Phosphate 10 mmol/


 Magnesium Sulfate


 8 meq/Calcium


 Gluconate 15 meq/


 Multivitamins 10


 ml/Chromium/


 Copper/Manganese/


 Seleni/Zn 0.5 ml/


 Insulin Human


 Regular 25 unit/


 Total Parenteral


 Nutrition/Amino


 Acids/Dextrose/


 Fat Emulsion


 Intravenous  1,400 ml @ 


 58.333 mls/


 hr  TPN  CONT  4/5/20 22:00


 4/6/20 21:59 DC 4/5/20 21:20





 


 Sodium Chloride


 90 meq/Potassium


 Chloride 15 meq/


 Potassium


 Phosphate 10 mmol/


 Magnesium Sulfate


 10 meq/Calcium


 Gluconate 20 meq/


 Multivitamins 10


 ml/Chromium/


 Copper/Manganese/


 Seleni/Zn 0.5 ml/


 Total Parenteral


 Nutrition/Amino


 Acids/Dextrose/


 Fat Emulsion


 Intravenous  1,400 ml @ 


 58.333 mls/


 hr  TPN  CONT  3/23/20 22:00


 3/24/20 21:59 DC 3/23/20 21:42





 


 Sodium Chloride


 90 meq/Potassium


 Chloride 15 meq/


 Potassium


 Phosphate 10 mmol/


 Magnesium Sulfate


 12 meq/Calcium


 Gluconate 15 meq/


 Multivitamins 10


 ml/Chromium/


 Copper/Manganese/


 Seleni/Zn 0.5 ml/


 Insulin Human


 Regular 25 unit/


 Total Parenteral


 Nutrition/Amino


 Acids/Dextrose/


 Fat Emulsion


 Intravenous  1,400 ml @ 


 58.333 mls/


 hr  TPN  CONT  4/6/20 22:00


 4/7/20 21:59 DC 4/6/20 22:24





 


 Sodium Chloride


 90 meq/Potassium


 Chloride 15 meq/


 Potassium


 Phosphate 15 mmol/


 Magnesium Sulfate


 10 meq/Calcium


 Gluconate 15 meq/


 Multivitamins 10


 ml/Chromium/


 Copper/Manganese/


 Seleni/Zn 0.5 ml/


 Total Parenteral


 Nutrition/Amino


 Acids/Dextrose/


 Fat Emulsion


 Intravenous  1,400 ml @ 


 58.333 mls/


 hr  TPN  CONT  3/24/20 22:00


 3/25/20 21:59 DC 3/24/20 22:17





 


 Sodium Chloride


 90 meq/Potassium


 Chloride 15 meq/


 Potassium


 Phosphate 15 mmol/


 Magnesium Sulfate


 10 meq/Calcium


 Gluconate 20 meq/


 Multivitamins 10


 ml/Chromium/


 Copper/Manganese/


 Seleni/Zn 0.5 ml/


 Total Parenteral


 Nutrition/Amino


 Acids/Dextrose/


 Fat Emulsion


 Intravenous  1,200 ml @ 


 50 mls/hr  TPN  CONT  3/22/20 22:00


 3/22/20 14:17 DC  





 


 Sodium Chloride


 90 meq/Potassium


 Chloride 15 meq/


 Potassium


 Phosphate 18 mmol/


 Magnesium Sulfate


 8 meq/Calcium


 Gluconate 15 meq/


 Multivitamins 10


 ml/Chromium/


 Copper/Manganese/


 Seleni/Zn 0.5 ml/


 Insulin Human


 Regular 10 unit/


 Total Parenteral


 Nutrition/Amino


 Acids/Dextrose/


 Fat Emulsion


 Intravenous  1,400 ml @ 


 58.333 mls/


 hr  TPN  CONT  3/27/20 22:00


 3/28/20 21:59 DC 3/27/20 21:43





 


 Sodium Chloride


 90 meq/Potassium


 Chloride 15 meq/


 Potassium


 Phosphate 18 mmol/


 Magnesium Sulfate


 8 meq/Calcium


 Gluconate 15 meq/


 Multivitamins 10


 ml/Chromium/


 Copper/Manganese/


 Seleni/Zn 0.5 ml/


 Insulin Human


 Regular 15 unit/


 Total Parenteral


 Nutrition/Amino


 Acids/Dextrose/


 Fat Emulsion


 Intravenous  1,400 ml @ 


 58.333 mls/


 hr  TPN  CONT  3/30/20 22:00


 3/31/20 21:59 DC 3/30/20 21:47





 


 Sodium Chloride


 90 meq/Potassium


 Chloride 15 meq/


 Potassium


 Phosphate 18 mmol/


 Magnesium Sulfate


 8 meq/Calcium


 Gluconate 15 meq/


 Multivitamins 10


 ml/Chromium/


 Copper/Manganese/


 Seleni/Zn 0.5 ml/


 Insulin Human


 Regular 20 unit/


 Total Parenteral


 Nutrition/Amino


 Acids/Dextrose/


 Fat Emulsion


 Intravenous  1,400 ml @ 


 58.333 mls/


 hr  TPN  CONT  4/2/20 22:00


 4/3/20 21:59 DC 4/2/20 22:45





 


 Sodium Chloride


 90 meq/Potassium


 Chloride 15 meq/


 Potassium


 Phosphate 18 mmol/


 Magnesium Sulfate


 8 meq/Calcium


 Gluconate 15 meq/


 Multivitamins 10


 ml/Chromium/


 Copper/Manganese/


 Seleni/Zn 0.5 ml/


 Total Parenteral


 Nutrition/Amino


 Acids/Dextrose/


 Fat Emulsion


 Intravenous  1,400 ml @ 


 58.333 mls/


 hr  TPN  CONT  3/26/20 22:00


 3/27/20 21:59 DC 3/26/20 22:00





 


 Sodium Chloride


 90 meq/Potassium


 Phosphate 15 mmol/


 Magnesium Sulfate


 12 meq/Calcium


 Gluconate 15 meq/


 Multivitamins 10


 ml/Chromium/


 Copper/Manganese/


 Seleni/Zn 0.5 ml/


 Insulin Human


 Regular 30 unit/


 Total Parenteral


 Nutrition/Amino


 Acids/Dextrose/


 Fat Emulsion


 Intravenous  1,400 ml @ 


 58.333 mls/


 hr  TPN  CONT  4/10/20 22:00


 4/11/20 21:59 DC 4/10/20 21:49





 


 Sodium Chloride


 90 meq/Potassium


 Phosphate 15 mmol/


 Magnesium Sulfate


 12 meq/Calcium


 Gluconate 15 meq/


 Multivitamins 10


 ml/Chromium/


 Copper/Manganese/


 Seleni/Zn 0.5 ml/


 Insulin Human


 Regular 40 unit/


 Total Parenteral


 Nutrition/Amino


 Acids/Dextrose/


 Fat Emulsion


 Intravenous  1,400 ml @ 


 58.333 mls/


 hr  TPN  CONT  4/11/20 22:00


 4/12/20 21:59 DC 4/11/20 21:21





 


 Sodium Chloride


 90 meq/Potassium


 Phosphate 19 mmol/


 Magnesium Sulfate


 12 meq/Calcium


 Gluconate 15 meq/


 Multivitamins 10


 ml/Chromium/


 Copper/Manganese/


 Seleni/Zn 0.5 ml/


 Insulin Human


 Regular 40 unit/


 Total Parenteral


 Nutrition/Amino


 Acids/Dextrose/


 Fat Emulsion


 Intravenous  1,400 ml @ 


 58.333 mls/


 hr  TPN  CONT  4/12/20 22:00


 4/13/20 21:59 DC 4/12/20 21:54





 


 Sodium Chloride


 90 meq/Potassium


 Phosphate 5 mmol/


 Magnesium Sulfate


 12 meq/Calcium


 Gluconate 15 meq/


 Multivitamins 10


 ml/Chromium/


 Copper/Manganese/


 Seleni/Zn 0.5 ml/


 Insulin Human


 Regular 30 unit/


 Total Parenteral


 Nutrition/Amino


 Acids/Dextrose/


 Fat Emulsion


 Intravenous  1,400 ml @ 


 58.333 mls/


 hr  TPN  CONT  4/9/20 22:00


 4/10/20 21:59 DC 4/9/20 22:08





 


 Sodium Chloride


 100 meq/Potassium


 Chloride 40 meq/


 Magnesium Sulfate


 15 meq/Calcium


 Gluconate 15 meq/


 Multivitamins 10


 ml/Chromium/


 Copper/Manganese/


 Seleni/Zn 0.5 ml/


 Insulin Human


 Regular 35 unit/


 Total Parenteral


 Nutrition/Amino


 Acids/Dextrose/


 Fat Emulsion


 Intravenous  1,400 ml @ 


 58.333 mls/


 hr  TPN  CONT  4/19/20 22:00


 4/20/20 21:59 DC 4/19/20 22:46





 


 Sodium Chloride


 100 meq/Potassium


 Chloride 40 meq/


 Magnesium Sulfate


 20 meq/Calcium


 Gluconate 10 meq/


 Multivitamins 10


 ml/Chromium/


 Copper/Manganese/


 Seleni/Zn 0.5 ml/


 Insulin Human


 Regular 35 unit/


 Total Parenteral


 Nutrition/Amino


 Acids/Dextrose/


 Fat Emulsion


 Intravenous  1,400 ml @ 


 58.333 mls/


 hr  TPN  CONT  4/23/20 22:00


 4/24/20 21:59 DC 4/24/20 00:06





 


 Sodium Chloride


 100 meq/Potassium


 Chloride 40 meq/


 Magnesium Sulfate


 20 meq/Calcium


 Gluconate 15 meq/


 Multivitamins 10


 ml/Chromium/


 Copper/Manganese/


 Seleni/Zn 0.5 ml/


 Insulin Human


 Regular 35 unit/


 Total Parenteral


 Nutrition/Amino


 Acids/Dextrose/


 Fat Emulsion


 Intravenous  1,400 ml @ 


 58.333 mls/


 hr  TPN  CONT  4/22/20 22:00


 4/23/20 21:59 DC 4/22/20 22:27





 


 Sodium Chloride


 100 meq/Potassium


 Phosphate 10 mmol/


 Magnesium Sulfate


 12 meq/Calcium


 Gluconate 15 meq/


 Multivitamins 10


 ml/Chromium/


 Copper/Manganese/


 Seleni/Zn 0.5 ml/


 Insulin Human


 Regular 35 unit/


 Potassium


 Chloride 20 meq/


 Total Parenteral


 Nutrition/Amino


 Acids/Dextrose/


 Fat Emulsion


 Intravenous  1,400 ml @ 


 58.333 mls/


 hr  TPN  CONT  4/16/20 22:00


 4/17/20 21:59 DC 4/16/20 22:10





 


 Sodium Chloride


 100 meq/Potassium


 Phosphate 19 mmol/


 Magnesium Sulfate


 12 meq/Calcium


 Gluconate 15 meq/


 Multivitamins 10


 ml/Chromium/


 Copper/Manganese/


 Seleni/Zn 0.5 ml/


 Insulin Human


 Regular 40 unit/


 Potassium


 Chloride 20 meq/


 Total Parenteral


 Nutrition/Amino


 Acids/Dextrose/


 Fat Emulsion


 Intravenous  1,400 ml @ 


 58.333 mls/


 hr  TPN  CONT  4/15/20 22:00


 4/16/20 21:59 DC 4/15/20 21:20





 


 Sodium Chloride


 100 meq/Potassium


 Phosphate 5 mmol/


 Magnesium Sulfate


 12 meq/Calcium


 Gluconate 15 meq/


 Multivitamins 10


 ml/Chromium/


 Copper/Manganese/


 Seleni/Zn 0.5 ml/


 Insulin Human


 Regular 35 unit/


 Potassium


 Chloride 20 meq/


 Total Parenteral


 Nutrition/Amino


 Acids/Dextrose/


 Fat Emulsion


 Intravenous  1,400 ml @ 


 58.333 mls/


 hr  TPN  CONT  4/17/20 22:00


 4/18/20 21:59 DC 4/17/20 22:59





 


 Succinylcholine


 Chloride


  (Anectine)  120 mg  1X  ONCE  3/23/20 08:30


 3/23/20 08:31 DC 3/23/20 08:34





 


 Vecuronium Bromide


  (Norcuron Bolus)  6 mg  PRN Q6HRS  PRN  5/7/20 19:15


 5/7/20 19:35 DC  














Labs:


Lab





Laboratory Tests








Test


 6/7/20


08:08 6/7/20


11:30 6/7/20


13:20 6/7/20


18:05


 


Hemoglobin


 8.3 g/dL


(12.0-15.5) 


 9.6 g/dL


(12.0-15.5) 





 


Hematocrit


 26.0 %


(36.0-47.0) 


 28.9 %


(36.0-47.0) 





 


Mean Corpuscular Hemoglobin


Concent 32 g/dL


(31-37) 


 33 g/dL


(31-37) 





 


Urine Collection Type  Unknown   


 


Urine Color  Yellow   


 


Urine Clarity  Cloudy   


 


Urine pH  5.0 (<5.0-8.0)   


 


Urine Specific Gravity


 


 1.020


(1.000-1.030) 


 





 


Urine Protein


 


 30 mg/dL


(NEG-TRACE) 


 





 


Urine Glucose (UA)


 


 Negative mg/dL


(NEG) 


 





 


Urine Ketones (Stick)


 


 Negative mg/dL


(NEG) 


 





 


Urine Blood  Negative (NEG)   


 


Urine Nitrite  Negative (NEG)   


 


Urine Bilirubin  Negative (NEG)   


 


Urine Urobilinogen Dipstick


 


 0.2 mg/dL (0.2


mg/dL) 


 





 


Urine Leukocyte Esterase  Small (NEG)   


 


Urine RBC  0 /HPF (0-2)   


 


Urine WBC


 


 11-20 /HPF


(0-4) 


 





 


Urine Squamous Epithelial


Cells 


 Mod /LPF 


 


 





 


Urine Transitional Epithelial


Cells 


 Mod /LPF 


 


 





 


Urine Amorphous Sediment  Present /HPF   


 


Urine Bacteria


 


 Few /HPF


(0-FEW) 


 





 


Urine Hyaline Casts  Many /HPF   


 


Urine Granular Casts  Many /HPF   


 


Urine Mucus  Marked /LPF   


 


Urine Yeast  Present /HPF   


 


White Blood Count


 


 


 20.9 x10^3/uL


(4.0-11.0) 





 


Red Blood Count


 


 


 3.23 x10^6/uL


(3.50-5.40) 





 


Mean Corpuscular Volume   89 fL ()  


 


Mean Corpuscular Hemoglobin   30 pg (25-35)  


 


Red Cell Distribution Width


 


 


 17.0 %


(11.5-14.5) 





 


Platelet Count


 


 


 433 x10^3/uL


(140-400) 





 


O2 Saturation    95 % (92-99) 


 


Arterial Blood pH


 


 


 


 7.25


(7.35-7.45)


 


Arterial Blood pCO2 at


Patient Temp 


 


 


 48 mmHg


(35-46)


 


Arterial Blood pO2 at Patient


Temp 


 


 


 92 mmHg


()


 


Arterial Blood HCO3


 


 


 


 21 mmol/L


(21-28)


 


Arterial Blood Base Excess


 


 


 


 -6 mmol/L


(-3-3)


 


FiO2    50 


 


Test


 6/7/20


18:30 6/7/20


18:37 6/7/20


20:45 6/8/20


00:30


 


White Blood Count


 21.2 x10^3/uL


(4.0-11.0) 


 


 19.8 x10^3/uL


(4.0-11.0)


 


Red Blood Count


 2.51 x10^6/uL


(3.50-5.40) 


 


 3.14 x10^6/uL


(3.50-5.40)


 


Hemoglobin


 7.4 g/dL


(12.0-15.5) 


 


 9.4 g/dL


(12.0-15.5)


 


Hematocrit


 22.6 %


(36.0-47.0) 


 


 28.0 %


(36.0-47.0)


 


Mean Corpuscular Volume 90 fL ()    89 fL () 


 


Mean Corpuscular Hemoglobin 30 pg (25-35)    30 pg (25-35) 


 


Mean Corpuscular Hemoglobin


Concent 33 g/dL


(31-37) 


 


 34 g/dL


(31-37)


 


Red Cell Distribution Width


 17.3 %


(11.5-14.5) 


 


 16.8 %


(11.5-14.5)


 


Platelet Count


 310 x10^3/uL


(140-400) 


 


 325 x10^3/uL


(140-400)


 


Glucose (Fingerstick)


 


 234 mg/dL


(70-99) 


 





 


O2 Saturation   97 % (92-99)  


 


Arterial Blood pH


 


 


 7.27


(7.35-7.45) 





 


Arterial Blood pCO2 at


Patient Temp 


 


 44 mmHg


(35-46) 





 


Arterial Blood pO2 at Patient


Temp 


 


 108 mmHg


() 





 


Arterial Blood HCO3


 


 


 20 mmol/L


(21-28) 





 


Arterial Blood Base Excess


 


 


 -7 mmol/L


(-3-3) 





 


FiO2   40  


 


Test


 6/8/20


00:45 6/8/20


06:50 6/8/20


06:59 





 


Glucose (Fingerstick)


 320 mg/dL


(70-99) 


 149 mg/dL


(70-99) 





 


White Blood Count


 


 13.8 x10^3/uL


(4.0-11.0) 


 





 


Red Blood Count


 


 3.03 x10^6/uL


(3.50-5.40) 


 





 


Hemoglobin


 


 8.8 g/dL


(12.0-15.5) 


 





 


Hematocrit


 


 26.9 %


(36.0-47.0) 


 





 


Mean Corpuscular Volume  89 fL ()   


 


Mean Corpuscular Hemoglobin  29 pg (25-35)   


 


Mean Corpuscular Hemoglobin


Concent 


 33 g/dL


(31-37) 


 





 


Red Cell Distribution Width


 


 17.2 %


(11.5-14.5) 


 





 


Platelet Count


 


 311 x10^3/uL


(140-400) 


 





 


Neutrophils (%) (Auto)  88 % (31-73)   


 


Lymphocytes (%) (Auto)  7 % (24-48)   


 


Monocytes (%) (Auto)  4 % (0-9)   


 


Eosinophils (%) (Auto)  1 % (0-3)   


 


Basophils (%) (Auto)  0 % (0-3)   


 


Neutrophils # (Auto)


 


 12.1 x10^3/uL


(1.8-7.7) 


 





 


Lymphocytes # (Auto)


 


 0.9 x10^3/uL


(1.0-4.8) 


 





 


Monocytes # (Auto)


 


 0.6 x10^3/uL


(0.0-1.1) 


 





 


Eosinophils # (Auto)


 


 0.1 x10^3/uL


(0.0-0.7) 


 





 


Basophils # (Auto)


 


 0.0 x10^3/uL


(0.0-0.2) 


 





 


Sodium Level


 


 151 mmol/L


(136-145) 


 





 


Potassium Level


 


 4.1 mmol/L


(3.5-5.1) 


 





 


Chloride Level


 


 118 mmol/L


() 


 





 


Carbon Dioxide Level


 


 25 mmol/L


(21-32) 


 





 


Anion Gap  8 (6-14)   


 


Blood Urea Nitrogen


 


 58 mg/dL


(7-20) 


 





 


Creatinine


 


 1.3 mg/dL


(0.6-1.0) 


 





 


Estimated GFR


(Cockcroft-Gault) 


 43.5 


 


 





 


BUN/Creatinine Ratio  45 (6-20)   


 


Glucose Level


 


 165 mg/dL


(70-99) 


 





 


Calcium Level


 


 9.9 mg/dL


(8.5-10.1) 


 





 


Total Bilirubin


 


 0.5 mg/dL


(0.2-1.0) 


 





 


Aspartate Amino Transf


(AST/SGOT) 


 15 U/L (15-37) 


 


 





 


Alanine Aminotransferase


(ALT/SGPT) 


 12 U/L (14-59) 


 


 





 


Alkaline Phosphatase


 


 95 U/L


() 


 





 


Total Protein


 


 5.3 g/dL


(6.4-8.2) 


 





 


Albumin


 


 1.5 g/dL


(3.4-5.0) 


 





 


Albumin/Globulin Ratio  0.4 (1.0-1.7)   











Objective:


Assessment:


Fever intermittent could be from underlying pancreatitis vs  aspiration 

pneumonia


Acute pancreatitis with persistent  necrosis


CT a/p 4/9


    Increased ascites. Persistent evidence of necrotizing pancreatitis with 

fluid and phlegmon


   at the pancreas


   4/27 status post ROBERT drain placement; yeast


   5/6 fluid  candida parapsilosis fluid amylase high


CT 6/7


IMPRESSION:


1.   Sequela of pancreatitis with extensive pseudocysts again 


demonstrated, the right-sided collections are slightly larger since the 


prior exam, the left-sided collections are stable. 





Cholelithiasis with thickening of the gallbladder wall.


Leucocytosis 


JED,Hyperkalemia, Metabolic acidosis off dialysis


Acute hypoxic resp failure ,bilateral pleural effusion and atelectasis


hypocalcemia 


Prediabetes


HTN


s/p trach





Plan:


Plan of Care


 


 Continue meropenem, dose adjusted for renal function 


cont  micafungin /dapto ( 6/7)


Follow-up cultures and lab


General surgery following


Monitor drain output


Maintain aspiration precaution


Supportive care


Prognosis poor


Critically ill





D/w nursing











IVAN FRANZ MD            Jun 8, 2020 07:48

## 2020-06-09 VITALS — DIASTOLIC BLOOD PRESSURE: 74 MMHG | SYSTOLIC BLOOD PRESSURE: 163 MMHG

## 2020-06-09 VITALS — SYSTOLIC BLOOD PRESSURE: 134 MMHG | DIASTOLIC BLOOD PRESSURE: 66 MMHG

## 2020-06-09 VITALS — SYSTOLIC BLOOD PRESSURE: 138 MMHG | DIASTOLIC BLOOD PRESSURE: 78 MMHG

## 2020-06-09 VITALS — DIASTOLIC BLOOD PRESSURE: 74 MMHG | SYSTOLIC BLOOD PRESSURE: 157 MMHG

## 2020-06-09 VITALS — SYSTOLIC BLOOD PRESSURE: 166 MMHG | DIASTOLIC BLOOD PRESSURE: 78 MMHG

## 2020-06-09 VITALS — SYSTOLIC BLOOD PRESSURE: 152 MMHG | DIASTOLIC BLOOD PRESSURE: 74 MMHG

## 2020-06-09 VITALS — DIASTOLIC BLOOD PRESSURE: 81 MMHG | SYSTOLIC BLOOD PRESSURE: 160 MMHG

## 2020-06-09 VITALS — DIASTOLIC BLOOD PRESSURE: 80 MMHG | SYSTOLIC BLOOD PRESSURE: 160 MMHG

## 2020-06-09 VITALS — SYSTOLIC BLOOD PRESSURE: 143 MMHG | DIASTOLIC BLOOD PRESSURE: 79 MMHG

## 2020-06-09 VITALS — DIASTOLIC BLOOD PRESSURE: 74 MMHG | SYSTOLIC BLOOD PRESSURE: 172 MMHG

## 2020-06-09 VITALS — SYSTOLIC BLOOD PRESSURE: 134 MMHG | DIASTOLIC BLOOD PRESSURE: 68 MMHG

## 2020-06-09 VITALS — DIASTOLIC BLOOD PRESSURE: 70 MMHG | SYSTOLIC BLOOD PRESSURE: 160 MMHG

## 2020-06-09 VITALS — SYSTOLIC BLOOD PRESSURE: 133 MMHG | DIASTOLIC BLOOD PRESSURE: 69 MMHG

## 2020-06-09 VITALS — DIASTOLIC BLOOD PRESSURE: 76 MMHG | SYSTOLIC BLOOD PRESSURE: 162 MMHG

## 2020-06-09 VITALS — DIASTOLIC BLOOD PRESSURE: 77 MMHG | SYSTOLIC BLOOD PRESSURE: 147 MMHG

## 2020-06-09 VITALS — SYSTOLIC BLOOD PRESSURE: 151 MMHG | DIASTOLIC BLOOD PRESSURE: 68 MMHG

## 2020-06-09 VITALS — DIASTOLIC BLOOD PRESSURE: 72 MMHG | SYSTOLIC BLOOD PRESSURE: 152 MMHG

## 2020-06-09 VITALS — SYSTOLIC BLOOD PRESSURE: 186 MMHG | DIASTOLIC BLOOD PRESSURE: 85 MMHG

## 2020-06-09 VITALS — DIASTOLIC BLOOD PRESSURE: 75 MMHG | SYSTOLIC BLOOD PRESSURE: 136 MMHG

## 2020-06-09 VITALS — DIASTOLIC BLOOD PRESSURE: 64 MMHG | SYSTOLIC BLOOD PRESSURE: 144 MMHG

## 2020-06-09 VITALS — SYSTOLIC BLOOD PRESSURE: 142 MMHG | DIASTOLIC BLOOD PRESSURE: 74 MMHG

## 2020-06-09 VITALS — SYSTOLIC BLOOD PRESSURE: 153 MMHG | DIASTOLIC BLOOD PRESSURE: 78 MMHG

## 2020-06-09 VITALS — SYSTOLIC BLOOD PRESSURE: 132 MMHG | DIASTOLIC BLOOD PRESSURE: 73 MMHG

## 2020-06-09 VITALS — SYSTOLIC BLOOD PRESSURE: 165 MMHG | DIASTOLIC BLOOD PRESSURE: 87 MMHG

## 2020-06-09 LAB
ANION GAP SERPL CALC-SCNC: 9 MMOL/L (ref 6–14)
BASE EXCESS ABG: -5 MMOL/L (ref -3–3)
BASOPHILS # BLD AUTO: 0 X10^3/UL (ref 0–0.2)
BASOPHILS NFR BLD: 0 % (ref 0–3)
BUN SERPL-MCNC: 48 MG/DL (ref 7–20)
CALCIUM SERPL-MCNC: 10.3 MG/DL (ref 8.5–10.1)
CHLORIDE SERPL-SCNC: 118 MMOL/L (ref 98–107)
CO2 SERPL-SCNC: 23 MMOL/L (ref 21–32)
CREAT SERPL-MCNC: 1.1 MG/DL (ref 0.6–1)
EOSINOPHIL NFR BLD: 0.2 X10^3/UL (ref 0–0.7)
EOSINOPHIL NFR BLD: 2 % (ref 0–3)
ERYTHROCYTE [DISTWIDTH] IN BLOOD BY AUTOMATED COUNT: 17.4 % (ref 11.5–14.5)
GFR SERPLBLD BASED ON 1.73 SQ M-ARVRAT: 52.8 ML/MIN
GLUCOSE SERPL-MCNC: 165 MG/DL (ref 70–99)
HCO3 BLDA-SCNC: 20 MMOL/L (ref 21–28)
HCT VFR BLD CALC: 26.2 % (ref 36–47)
HGB BLD-MCNC: 8.6 G/DL (ref 12–15.5)
INSPIRATION SETTING TIME VENT: 33
LYMPHOCYTES # BLD: 1.3 X10^3/UL (ref 1–4.8)
LYMPHOCYTES NFR BLD AUTO: 16 % (ref 24–48)
MCH RBC QN AUTO: 30 PG (ref 25–35)
MCHC RBC AUTO-ENTMCNC: 33 G/DL (ref 31–37)
MCV RBC AUTO: 90 FL (ref 79–100)
MONO #: 0.4 X10^3/UL (ref 0–1.1)
MONOCYTES NFR BLD: 5 % (ref 0–9)
NEUT #: 6.1 X10^3/UL (ref 1.8–7.7)
NEUTROPHILS NFR BLD AUTO: 77 % (ref 31–73)
PCO2 BLDA: 36 MMHG (ref 35–46)
PLATELET # BLD AUTO: 285 X10^3/UL (ref 140–400)
PO2 BLDA: 63 MMHG (ref 75–108)
POTASSIUM SERPL-SCNC: 4.2 MMOL/L (ref 3.5–5.1)
RBC # BLD AUTO: 2.92 X10^6/UL (ref 3.5–5.4)
SAO2 % BLDA: 90 % (ref 92–99)
SODIUM SERPL-SCNC: 150 MMOL/L (ref 136–145)
WBC # BLD AUTO: 8 X10^3/UL (ref 4–11)

## 2020-06-09 RX ADMIN — BACITRACIN SCH MLS/HR: 5000 INJECTION, POWDER, FOR SOLUTION INTRAMUSCULAR at 06:20

## 2020-06-09 RX ADMIN — INSULIN LISPRO SCH UNITS: 100 INJECTION, SOLUTION INTRAVENOUS; SUBCUTANEOUS at 06:00

## 2020-06-09 RX ADMIN — FENTANYL CITRATE PRN MCG: 50 INJECTION INTRAMUSCULAR; INTRAVENOUS at 17:51

## 2020-06-09 RX ADMIN — BACITRACIN SCH MLS/HR: 5000 INJECTION, POWDER, FOR SOLUTION INTRAMUSCULAR at 17:40

## 2020-06-09 RX ADMIN — PANTOPRAZOLE SODIUM SCH MG: 40 INJECTION, POWDER, FOR SOLUTION INTRAVENOUS at 09:16

## 2020-06-09 RX ADMIN — DILTIAZEM HYDROCHLORIDE SCH MLS/HR: 5 INJECTION INTRAVENOUS at 09:15

## 2020-06-09 RX ADMIN — INSULIN LISPRO SCH UNITS: 100 INJECTION, SOLUTION INTRAVENOUS; SUBCUTANEOUS at 18:19

## 2020-06-09 RX ADMIN — DILTIAZEM HYDROCHLORIDE SCH MLS/HR: 5 INJECTION INTRAVENOUS at 12:02

## 2020-06-09 RX ADMIN — DILTIAZEM HYDROCHLORIDE SCH MLS/HR: 5 INJECTION INTRAVENOUS at 22:02

## 2020-06-09 RX ADMIN — Medication PRN EACH: at 12:43

## 2020-06-09 RX ADMIN — FENTANYL CITRATE PRN MCG: 50 INJECTION INTRAMUSCULAR; INTRAVENOUS at 04:40

## 2020-06-09 RX ADMIN — PROCHLORPERAZINE EDISYLATE PRN MG: 5 INJECTION INTRAMUSCULAR; INTRAVENOUS at 03:45

## 2020-06-09 RX ADMIN — INSULIN LISPRO SCH UNITS: 100 INJECTION, SOLUTION INTRAVENOUS; SUBCUTANEOUS at 12:00

## 2020-06-09 RX ADMIN — INSULIN LISPRO SCH UNITS: 100 INJECTION, SOLUTION INTRAVENOUS; SUBCUTANEOUS at 00:00

## 2020-06-09 RX ADMIN — FENTANYL CITRATE PRN MCG: 50 INJECTION INTRAMUSCULAR; INTRAVENOUS at 22:02

## 2020-06-09 RX ADMIN — FENTANYL CITRATE PRN MCG: 50 INJECTION INTRAMUSCULAR; INTRAVENOUS at 13:14

## 2020-06-09 RX ADMIN — DAPTOMYCIN SCH MLS/HR: 500 INJECTION, POWDER, LYOPHILIZED, FOR SOLUTION INTRAVENOUS at 13:33

## 2020-06-09 RX ADMIN — FENTANYL CITRATE PRN MCG: 50 INJECTION INTRAMUSCULAR; INTRAVENOUS at 02:29

## 2020-06-09 RX ADMIN — FENTANYL CITRATE PRN MCG: 50 INJECTION INTRAMUSCULAR; INTRAVENOUS at 09:16

## 2020-06-09 NOTE — PDOC
SURGICAL PROGRESS NOTE


Subjective


Pt on t tube trial, sleeping


Vital Signs





Vital Signs








  Date Time  Temp Pulse Resp B/P (MAP) Pulse Ox O2 Delivery O2 Flow Rate FiO2


 


6/9/20 11:00  128 36 132/73 (92) 100 Tracheal Collar 10.0 


 


6/9/20 08:00 99.5       





 99.5       








I&O











Intake and Output 


 


 6/9/20





 07:00


 


Intake Total 4791 ml


 


Output Total 2495 ml


 


Balance 2296 ml


 


 


 


Intake Oral 0 ml


 


IV Total 4791 ml


 


Output Urine Total 1810 ml


 


Drainage Total 685 ml


 


# Bowel Movements 1








General:  No acute distress


Abdomen:  Soft, No tenderness, Other (drain c/w dark blood)


Labs





Laboratory Tests








Test


 6/7/20


11:30 6/7/20


13:20 6/7/20


18:05 6/7/20


18:30


 


Urine Collection Type Unknown    


 


Urine Color Yellow    


 


Urine Clarity Cloudy    


 


Urine pH 5.0 (<5.0-8.0)    


 


Urine Specific Gravity


 1.020


(1.000-1.030) 


 


 





 


Urine Protein


 30 mg/dL


(NEG-TRACE) 


 


 





 


Urine Glucose (UA)


 Negative mg/dL


(NEG) 


 


 





 


Urine Ketones (Stick)


 Negative mg/dL


(NEG) 


 


 





 


Urine Blood Negative (NEG)    


 


Urine Nitrite Negative (NEG)    


 


Urine Bilirubin Negative (NEG)    


 


Urine Urobilinogen Dipstick


 0.2 mg/dL (0.2


mg/dL) 


 


 





 


Urine Leukocyte Esterase Small (NEG)    


 


Urine RBC 0 /HPF (0-2)    


 


Urine WBC


 11-20 /HPF


(0-4) 


 


 





 


Urine Squamous Epithelial


Cells Mod /LPF 


 


 


 





 


Urine Transitional Epithelial


Cells Mod /LPF 


 


 


 





 


Urine Amorphous Sediment Present /HPF    


 


Urine Bacteria


 Few /HPF


(0-FEW) 


 


 





 


Urine Hyaline Casts Many /HPF    


 


Urine Granular Casts Many /HPF    


 


Urine Mucus Marked /LPF    


 


Urine Yeast Present /HPF    


 


White Blood Count


 


 20.9 x10^3/uL


(4.0-11.0) 


 21.2 x10^3/uL


(4.0-11.0)


 


Red Blood Count


 


 3.23 x10^6/uL


(3.50-5.40) 


 2.51 x10^6/uL


(3.50-5.40)


 


Hemoglobin


 


 9.6 g/dL


(12.0-15.5) 


 7.4 g/dL


(12.0-15.5)


 


Hematocrit


 


 28.9 %


(36.0-47.0) 


 22.6 %


(36.0-47.0)


 


Mean Corpuscular Volume  89 fL ()   90 fL () 


 


Mean Corpuscular Hemoglobin  30 pg (25-35)   30 pg (25-35) 


 


Mean Corpuscular Hemoglobin


Concent 


 33 g/dL


(31-37) 


 33 g/dL


(31-37)


 


Red Cell Distribution Width


 


 17.0 %


(11.5-14.5) 


 17.3 %


(11.5-14.5)


 


Platelet Count


 


 433 x10^3/uL


(140-400) 


 310 x10^3/uL


(140-400)


 


O2 Saturation   95 % (92-99)  


 


Arterial Blood pH


 


 


 7.25


(7.35-7.45) 





 


Arterial Blood pCO2 at


Patient Temp 


 


 48 mmHg


(35-46) 





 


Arterial Blood pO2 at Patient


Temp 


 


 92 mmHg


() 





 


Arterial Blood HCO3


 


 


 21 mmol/L


(21-28) 





 


Arterial Blood Base Excess


 


 


 -6 mmol/L


(-3-3) 





 


FiO2   50  


 


Test


 6/7/20


18:37 6/7/20


20:45 6/8/20


00:30 6/8/20


00:45


 


Glucose (Fingerstick)


 234 mg/dL


(70-99) 


 


 320 mg/dL


(70-99)


 


O2 Saturation  97 % (92-99)   


 


Arterial Blood pH


 


 7.27


(7.35-7.45) 


 





 


Arterial Blood pCO2 at


Patient Temp 


 44 mmHg


(35-46) 


 





 


Arterial Blood pO2 at Patient


Temp 


 108 mmHg


() 


 





 


Arterial Blood HCO3


 


 20 mmol/L


(21-28) 


 





 


Arterial Blood Base Excess


 


 -7 mmol/L


(-3-3) 


 





 


FiO2  40   


 


White Blood Count


 


 


 19.8 x10^3/uL


(4.0-11.0) 





 


Red Blood Count


 


 


 3.14 x10^6/uL


(3.50-5.40) 





 


Hemoglobin


 


 


 9.4 g/dL


(12.0-15.5) 





 


Hematocrit


 


 


 28.0 %


(36.0-47.0) 





 


Mean Corpuscular Volume   89 fL ()  


 


Mean Corpuscular Hemoglobin   30 pg (25-35)  


 


Mean Corpuscular Hemoglobin


Concent 


 


 34 g/dL


(31-37) 





 


Red Cell Distribution Width


 


 


 16.8 %


(11.5-14.5) 





 


Platelet Count


 


 


 325 x10^3/uL


(140-400) 





 


Test


 6/8/20


06:50 6/8/20


06:59 6/8/20


08:15 6/8/20


11:00


 


White Blood Count


 13.8 x10^3/uL


(4.0-11.0) 


 


 





 


Red Blood Count


 3.03 x10^6/uL


(3.50-5.40) 


 


 





 


Hemoglobin


 8.8 g/dL


(12.0-15.5) 


 


 





 


Hematocrit


 26.9 %


(36.0-47.0) 


 


 





 


Mean Corpuscular Volume 89 fL ()    


 


Mean Corpuscular Hemoglobin 29 pg (25-35)    


 


Mean Corpuscular Hemoglobin


Concent 33 g/dL


(31-37) 


 


 





 


Red Cell Distribution Width


 17.2 %


(11.5-14.5) 


 


 





 


Platelet Count


 311 x10^3/uL


(140-400) 


 


 





 


Neutrophils (%) (Auto) 88 % (31-73)    


 


Lymphocytes (%) (Auto) 7 % (24-48)    


 


Monocytes (%) (Auto) 4 % (0-9)    


 


Eosinophils (%) (Auto) 1 % (0-3)    


 


Basophils (%) (Auto) 0 % (0-3)    


 


Neutrophils # (Auto)


 12.1 x10^3/uL


(1.8-7.7) 


 


 





 


Lymphocytes # (Auto)


 0.9 x10^3/uL


(1.0-4.8) 


 


 





 


Monocytes # (Auto)


 0.6 x10^3/uL


(0.0-1.1) 


 


 





 


Eosinophils # (Auto)


 0.1 x10^3/uL


(0.0-0.7) 


 


 





 


Basophils # (Auto)


 0.0 x10^3/uL


(0.0-0.2) 


 


 





 


Segmented Neutrophils % 63 % (35-66)    


 


Band Neutrophils % 25 % (0-9)    


 


Lymphocytes % 8 % (24-48)    


 


Monocytes % 4 % (0-10)    


 


Toxic Granulation Present    


 


Dohle Bodies Present    


 


Platelet Estimate


 Adequate


(ADEQUATE) 


 


 





 


Anisocytosis Slight    


 


Sodium Level


 151 mmol/L


(136-145) 


 


 





 


Potassium Level


 4.1 mmol/L


(3.5-5.1) 


 


 





 


Chloride Level


 118 mmol/L


() 


 


 





 


Carbon Dioxide Level


 25 mmol/L


(21-32) 


 


 





 


Anion Gap 8 (6-14)    


 


Blood Urea Nitrogen


 58 mg/dL


(7-20) 


 


 





 


Creatinine


 1.3 mg/dL


(0.6-1.0) 


 


 





 


Estimated GFR


(Cockcroft-Gault) 43.5 


 


 


 





 


BUN/Creatinine Ratio 45 (6-20)    


 


Glucose Level


 165 mg/dL


(70-99) 


 


 





 


Calcium Level


 9.9 mg/dL


(8.5-10.1) 


 


 





 


Total Bilirubin


 0.5 mg/dL


(0.2-1.0) 


 


 





 


Aspartate Amino Transf


(AST/SGOT) 15 U/L (15-37) 


 


 


 





 


Alanine Aminotransferase


(ALT/SGPT) 12 U/L (14-59) 


 


 


 





 


Alkaline Phosphatase


 95 U/L


() 


 


 





 


Total Protein


 5.3 g/dL


(6.4-8.2) 


 


 





 


Albumin


 1.5 g/dL


(3.4-5.0) 


 


 





 


Albumin/Globulin Ratio 0.4 (1.0-1.7)    


 


Glucose (Fingerstick)


 


 149 mg/dL


(70-99) 


 





 


O2 Saturation   98 % (92-99)  


 


Arterial Blood pH


 


 


 7.34


(7.35-7.45) 





 


Arterial Blood pCO2 at


Patient Temp 


 


 40 mmHg


(35-46) 





 


Arterial Blood pO2 at Patient


Temp 


 


 126 mmHg


() 





 


Arterial Blood HCO3


 


 


 21 mmol/L


(21-28) 





 


Arterial Blood Base Excess


 


 


 -5 mmol/L


(-3-3) 





 


FiO2   40% bipap  


 


Stool Occult Blood    Negative (NEG) 


 


Test


 6/8/20


11:55 6/8/20


18:25 6/9/20


00:16 6/9/20


04:00


 


White Blood Count


 11.8 x10^3/uL


(4.0-11.0) 


 


 8.0 x10^3/uL


(4.0-11.0)


 


Red Blood Count


 2.93 x10^6/uL


(3.50-5.40) 


 


 2.92 x10^6/uL


(3.50-5.40)


 


Hemoglobin


 8.6 g/dL


(12.0-15.5) 


 


 8.6 g/dL


(12.0-15.5)


 


Hematocrit


 26.0 %


(36.0-47.0) 


 


 26.2 %


(36.0-47.0)


 


Mean Corpuscular Volume 89 fL ()    90 fL () 


 


Mean Corpuscular Hemoglobin 29 pg (25-35)    30 pg (25-35) 


 


Mean Corpuscular Hemoglobin


Concent 33 g/dL


(31-37) 


 


 33 g/dL


(31-37)


 


Red Cell Distribution Width


 16.8 %


(11.5-14.5) 


 


 17.4 %


(11.5-14.5)


 


Platelet Count


 292 x10^3/uL


(140-400) 


 


 285 x10^3/uL


(140-400)


 


Glucose (Fingerstick)


 203 mg/dL


(70-99) 190 mg/dL


(70-99) 126 mg/dL


(70-99) 





 


Neutrophils (%) (Auto)    77 % (31-73) 


 


Lymphocytes (%) (Auto)    16 % (24-48) 


 


Monocytes (%) (Auto)    5 % (0-9) 


 


Eosinophils (%) (Auto)    2 % (0-3) 


 


Basophils (%) (Auto)    0 % (0-3) 


 


Neutrophils # (Auto)


 


 


 


 6.1 x10^3/uL


(1.8-7.7)


 


Lymphocytes # (Auto)


 


 


 


 1.3 x10^3/uL


(1.0-4.8)


 


Monocytes # (Auto)


 


 


 


 0.4 x10^3/uL


(0.0-1.1)


 


Eosinophils # (Auto)


 


 


 


 0.2 x10^3/uL


(0.0-0.7)


 


Basophils # (Auto)


 


 


 


 0.0 x10^3/uL


(0.0-0.2)


 


Sodium Level


 


 


 


 150 mmol/L


(136-145)


 


Potassium Level


 


 


 


 4.2 mmol/L


(3.5-5.1)


 


Chloride Level


 


 


 


 118 mmol/L


()


 


Carbon Dioxide Level


 


 


 


 23 mmol/L


(21-32)


 


Anion Gap    9 (6-14) 


 


Blood Urea Nitrogen


 


 


 


 48 mg/dL


(7-20)


 


Creatinine


 


 


 


 1.1 mg/dL


(0.6-1.0)


 


Estimated GFR


(Cockcroft-Gault) 


 


 


 52.8 





 


Glucose Level


 


 


 


 165 mg/dL


(70-99)


 


Calcium Level


 


 


 


 10.3 mg/dL


(8.5-10.1)


 


Test


 6/9/20


05:24 6/9/20


06:22 


 





 


O2 Saturation 90 % (92-99)    


 


Arterial Blood pH


 7.36


(7.35-7.45) 


 


 





 


Arterial Blood pCO2 at


Patient Temp 36 mmHg


(35-46) 


 


 





 


Arterial Blood pO2 at Patient


Temp 63 mmHg


() 


 


 





 


Arterial Blood HCO3


 20 mmol/L


(21-28) 


 


 





 


Arterial Blood Base Excess


 -5 mmol/L


(-3-3) 


 


 





 


FiO2 33    


 


Glucose (Fingerstick)


 


 131 mg/dL


(70-99) 


 











Laboratory Tests








Test


 6/8/20


11:55 6/8/20


18:25 6/9/20


00:16 6/9/20


04:00


 


White Blood Count


 11.8 x10^3/uL


(4.0-11.0) 


 


 8.0 x10^3/uL


(4.0-11.0)


 


Red Blood Count


 2.93 x10^6/uL


(3.50-5.40) 


 


 2.92 x10^6/uL


(3.50-5.40)


 


Hemoglobin


 8.6 g/dL


(12.0-15.5) 


 


 8.6 g/dL


(12.0-15.5)


 


Hematocrit


 26.0 %


(36.0-47.0) 


 


 26.2 %


(36.0-47.0)


 


Mean Corpuscular Volume 89 fL ()    90 fL () 


 


Mean Corpuscular Hemoglobin 29 pg (25-35)    30 pg (25-35) 


 


Mean Corpuscular Hemoglobin


Concent 33 g/dL


(31-37) 


 


 33 g/dL


(31-37)


 


Red Cell Distribution Width


 16.8 %


(11.5-14.5) 


 


 17.4 %


(11.5-14.5)


 


Platelet Count


 292 x10^3/uL


(140-400) 


 


 285 x10^3/uL


(140-400)


 


Glucose (Fingerstick)


 203 mg/dL


(70-99) 190 mg/dL


(70-99) 126 mg/dL


(70-99) 





 


Neutrophils (%) (Auto)    77 % (31-73) 


 


Lymphocytes (%) (Auto)    16 % (24-48) 


 


Monocytes (%) (Auto)    5 % (0-9) 


 


Eosinophils (%) (Auto)    2 % (0-3) 


 


Basophils (%) (Auto)    0 % (0-3) 


 


Neutrophils # (Auto)


 


 


 


 6.1 x10^3/uL


(1.8-7.7)


 


Lymphocytes # (Auto)


 


 


 


 1.3 x10^3/uL


(1.0-4.8)


 


Monocytes # (Auto)


 


 


 


 0.4 x10^3/uL


(0.0-1.1)


 


Eosinophils # (Auto)


 


 


 


 0.2 x10^3/uL


(0.0-0.7)


 


Basophils # (Auto)


 


 


 


 0.0 x10^3/uL


(0.0-0.2)


 


Sodium Level


 


 


 


 150 mmol/L


(136-145)


 


Potassium Level


 


 


 


 4.2 mmol/L


(3.5-5.1)


 


Chloride Level


 


 


 


 118 mmol/L


()


 


Carbon Dioxide Level


 


 


 


 23 mmol/L


(21-32)


 


Anion Gap    9 (6-14) 


 


Blood Urea Nitrogen


 


 


 


 48 mg/dL


(7-20)


 


Creatinine


 


 


 


 1.1 mg/dL


(0.6-1.0)


 


Estimated GFR


(Cockcroft-Gault) 


 


 


 52.8 





 


Glucose Level


 


 


 


 165 mg/dL


(70-99)


 


Calcium Level


 


 


 


 10.3 mg/dL


(8.5-10.1)


 


Test


 6/9/20


05:24 6/9/20


06:22 


 





 


O2 Saturation 90 % (92-99)    


 


Arterial Blood pH


 7.36


(7.35-7.45) 


 


 





 


Arterial Blood pCO2 at


Patient Temp 36 mmHg


(35-46) 


 


 





 


Arterial Blood pO2 at Patient


Temp 63 mmHg


() 


 


 





 


Arterial Blood HCO3


 20 mmol/L


(21-28) 


 


 





 


Arterial Blood Base Excess


 -5 mmol/L


(-3-3) 


 


 





 


FiO2 33    


 


Glucose (Fingerstick)


 


 131 mg/dL


(70-99) 


 











Problem List


Problems


Medical Problems:


(1) Acute pancreatitis


Status: Acute  





(2) Cholelithiasis


Status: Acute  








Assessment/Plan


appears somewhat improved, but long term progress has been slow


If not continued improved, may benefit from return to OR for definitive surgery,

although high morbidity and even mortality with that.  Ideally, a few weeks 

still with intention of cystgastrostomy and cholecystectomy.





Justicifation of Admission Dx:


Justifications for Admission:


Justification of Admission Dx:  Yes











GAMAL ZHOU MD              Jun 9, 2020 11:16

## 2020-06-09 NOTE — PDOC
Subjective:


Subjective:


Mother present - says she's doing better today, has been resting.


She really likes the nursing staff - mentions Kassie has taken such good care of 

her daughter.  Additionally says she had a good conversation with Dr. Flores - 

says Edel is a miracle.





Objective:


Objective:


D/w nursing, reviewed chart.


Vital Signs:





                                   Vital Signs








  Date Time  Temp Pulse Resp B/P (MAP) Pulse Ox O2 Delivery O2 Flow Rate FiO2


 


6/9/20 11:00  128 36 132/73 (92) 100 Tracheal Collar 10.0 


 


6/9/20 08:00 99.5       





 99.5       








Labs:





Laboratory Tests








Test


 6/8/20


11:55 6/8/20


18:25 6/9/20


00:16 6/9/20


04:00


 


White Blood Count 11.8 x10^3/uL    8.0 x10^3/uL 


 


Red Blood Count 2.93 x10^6/uL    2.92 x10^6/uL 


 


Hemoglobin 8.6 g/dL    8.6 g/dL 


 


Hematocrit 26.0 %    26.2 % 


 


Mean Corpuscular Volume 89 fL    90 fL 


 


Mean Corpuscular Hemoglobin 29 pg    30 pg 


 


Mean Corpuscular Hemoglobin


Concent 33 g/dL 


 


 


 33 g/dL 





 


Red Cell Distribution Width 16.8 %    17.4 % 


 


Platelet Count 292 x10^3/uL    285 x10^3/uL 


 


Glucose (Fingerstick) 203 mg/dL  190 mg/dL  126 mg/dL  


 


Neutrophils (%) (Auto)    77 % 


 


Lymphocytes (%) (Auto)    16 % 


 


Monocytes (%) (Auto)    5 % 


 


Eosinophils (%) (Auto)    2 % 


 


Basophils (%) (Auto)    0 % 


 


Neutrophils # (Auto)    6.1 x10^3/uL 


 


Lymphocytes # (Auto)    1.3 x10^3/uL 


 


Monocytes # (Auto)    0.4 x10^3/uL 


 


Eosinophils # (Auto)    0.2 x10^3/uL 


 


Basophils # (Auto)    0.0 x10^3/uL 


 


Sodium Level    150 mmol/L 


 


Potassium Level    4.2 mmol/L 


 


Chloride Level    118 mmol/L 


 


Carbon Dioxide Level    23 mmol/L 


 


Anion Gap    9 


 


Blood Urea Nitrogen    48 mg/dL 


 


Creatinine    1.1 mg/dL 


 


Estimated GFR


(Cockcroft-Gault) 


 


 


 52.8 





 


Glucose Level    165 mg/dL 


 


Calcium Level    10.3 mg/dL 


 


Test


 6/9/20


05:24 6/9/20


06:22 


 





 


O2 Saturation 90 %    


 


Arterial Blood pH 7.36    


 


Arterial Blood pCO2 at


Patient Temp 36 mmHg 


 


 


 





 


Arterial Blood pO2 at Patient


Temp 63 mmHg 


 


 


 





 


Arterial Blood HCO3 20 mmol/L    


 


Arterial Blood Base Excess -5 mmol/L    


 


FiO2 33    


 


Glucose (Fingerstick)  131 mg/dL   





ORDERED:  ANAER/AEROB/GS                                                        

 


COMMENTS: ABDOMINAL ABSCESS                                           


---------------------------------

-----------------------------------------------------------





  Procedure                         Result                                      

         


 

--------------------------------------------------------------------------------


------------





  GRAM STAIN  Final  


        Final





PE:





GEN: chronically ill though looks better today


LUNGS: trach collar, tachypneic


HEART: tachycardic


ABD: drains w/ dark red/brown output


NEURO/PSYCH: drowsy





A/P:


Pancreatitis, MOSF


Anemia (stable), s/p IR drain replacement 6/7





--


Supportive care.





Justicifation of Admission Dx:


Justifications for Admission:


Justification of Admission Dx:  Yes











RAGHAVENDRA MURCIA          Jun 9, 2020 11:41

## 2020-06-09 NOTE — NUR
SS following up with discharge planning. SS reviewed pt chart and discussed with pt RN. Pt 
on trach shield currently and per RN, declining to wear trach cap today. Pt on IV 
Daptomycin, Micafungin, and Meropenem. Pt on TPN. Pt has three drains. PT/OT to work with pt 
today. SS will continue to follow for discharge planning.

## 2020-06-09 NOTE — NUR
Pharmacy TPN Dosing Note



S: JESENIA BEAN is a 49 year old F Currently receiving Central Continuous TPN started 
03/18/20



B:Pertinent PMH: Necrotizing pancreatitis

Height: 5 feet, 8 inches

Weight: 76.8 kg



Current diet: NPO 



LABS:

Sodium:    150 

Potassium: 4.2 

Chloride:  118 

Calcium:   10.3 

Corrected Calcium: 12.30 

Magnesium: 2.4 

CO2:       23 

SCr:       1.1 

Glucose:   126-165 

Albumin:   1.5 

AST:       15 

ALT:       12 



TPN FORMULA:

TPN TYPE:  Central Continuous

AMINO ACIDS:         75 gm

DEXTROSE:            250 gm

LIPIDS:              20 gm

POTASSIUM ACETATE:   40 mEq

MAGNESIUM:           5 mEq

INSULIN:             30 units

MULTIPLE VITAMIN:    10 ml

TRACE ELEMENTS:      1 ml(s)



TPN PLAN:  

Na trending down- cont current TPN. BG labile- will leave insulin as is

for now. .

-No labs ordered as patient stable.





R: Continue same TPN formula.

Will monitor electrolytes, glucose, and tolerance to TPN.



 LORENZO LESLIE [facility], 06/09/20 [id number]

## 2020-06-09 NOTE — PDOC
PULMONARY PROGRESS NOTES


Subjective


Patient intubated on 3/23 , s/p trach 4/6, 


transferred back to ICU 6/5 for syncope with collapse


developed anemia, blood drainage from RLQ abdomen drain site, and surrounding 

firmness  / developed septic shock 6/7 from abdomen source, required levo 6/7


s/p 3 new drains 6/7 with brown color drainage


was place on AVAPS 6/7 post 


On low dose levo for hypotension, now off. received aggressive IVF, improving 

renal function 


Again bleeding from ROBERT drains


Vitals





Vital Signs








  Date Time  Temp Pulse Resp B/P (MAP) Pulse Ox O2 Delivery O2 Flow Rate FiO2


 


6/9/20 11:00  128 36 132/73 (92) 100 Tracheal Collar 10.0 


 


6/9/20 08:00 99.5       





 99.5       








General:  Alert, No acute distress


Lungs:  Other (diminshed in bases, Rhonci in LLL)


Cardiovascular:  S1, S2


Abdomen:  Soft, Non-tender, Other (bleeding from Sx. site RLQ and firmness)


Extremities:  Other (+1 BLE edema)


Skin:  Warm, Dry


Labs





Laboratory Tests








Test


 6/7/20


11:30 6/7/20


13:20 6/7/20


18:05 6/7/20


18:30


 


Urine Collection Type Unknown    


 


Urine Color Yellow    


 


Urine Clarity Cloudy    


 


Urine pH 5.0 (<5.0-8.0)    


 


Urine Specific Gravity


 1.020


(1.000-1.030) 


 


 





 


Urine Protein


 30 mg/dL


(NEG-TRACE) 


 


 





 


Urine Glucose (UA)


 Negative mg/dL


(NEG) 


 


 





 


Urine Ketones (Stick)


 Negative mg/dL


(NEG) 


 


 





 


Urine Blood Negative (NEG)    


 


Urine Nitrite Negative (NEG)    


 


Urine Bilirubin Negative (NEG)    


 


Urine Urobilinogen Dipstick


 0.2 mg/dL (0.2


mg/dL) 


 


 





 


Urine Leukocyte Esterase Small (NEG)    


 


Urine RBC 0 /HPF (0-2)    


 


Urine WBC


 11-20 /HPF


(0-4) 


 


 





 


Urine Squamous Epithelial


Cells Mod /LPF 


 


 


 





 


Urine Transitional Epithelial


Cells Mod /LPF 


 


 


 





 


Urine Amorphous Sediment Present /HPF    


 


Urine Bacteria


 Few /HPF


(0-FEW) 


 


 





 


Urine Hyaline Casts Many /HPF    


 


Urine Granular Casts Many /HPF    


 


Urine Mucus Marked /LPF    


 


Urine Yeast Present /HPF    


 


White Blood Count


 


 20.9 x10^3/uL


(4.0-11.0) 


 21.2 x10^3/uL


(4.0-11.0)


 


Red Blood Count


 


 3.23 x10^6/uL


(3.50-5.40) 


 2.51 x10^6/uL


(3.50-5.40)


 


Hemoglobin


 


 9.6 g/dL


(12.0-15.5) 


 7.4 g/dL


(12.0-15.5)


 


Hematocrit


 


 28.9 %


(36.0-47.0) 


 22.6 %


(36.0-47.0)


 


Mean Corpuscular Volume  89 fL ()   90 fL () 


 


Mean Corpuscular Hemoglobin  30 pg (25-35)   30 pg (25-35) 


 


Mean Corpuscular Hemoglobin


Concent 


 33 g/dL


(31-37) 


 33 g/dL


(31-37)


 


Red Cell Distribution Width


 


 17.0 %


(11.5-14.5) 


 17.3 %


(11.5-14.5)


 


Platelet Count


 


 433 x10^3/uL


(140-400) 


 310 x10^3/uL


(140-400)


 


O2 Saturation   95 % (92-99)  


 


Arterial Blood pH


 


 


 7.25


(7.35-7.45) 





 


Arterial Blood pCO2 at


Patient Temp 


 


 48 mmHg


(35-46) 





 


Arterial Blood pO2 at Patient


Temp 


 


 92 mmHg


() 





 


Arterial Blood HCO3


 


 


 21 mmol/L


(21-28) 





 


Arterial Blood Base Excess


 


 


 -6 mmol/L


(-3-3) 





 


FiO2   50  


 


Test


 6/7/20


18:37 6/7/20


20:45 6/8/20


00:30 6/8/20


00:45


 


Glucose (Fingerstick)


 234 mg/dL


(70-99) 


 


 320 mg/dL


(70-99)


 


O2 Saturation  97 % (92-99)   


 


Arterial Blood pH


 


 7.27


(7.35-7.45) 


 





 


Arterial Blood pCO2 at


Patient Temp 


 44 mmHg


(35-46) 


 





 


Arterial Blood pO2 at Patient


Temp 


 108 mmHg


() 


 





 


Arterial Blood HCO3


 


 20 mmol/L


(21-28) 


 





 


Arterial Blood Base Excess


 


 -7 mmol/L


(-3-3) 


 





 


FiO2  40   


 


White Blood Count


 


 


 19.8 x10^3/uL


(4.0-11.0) 





 


Red Blood Count


 


 


 3.14 x10^6/uL


(3.50-5.40) 





 


Hemoglobin


 


 


 9.4 g/dL


(12.0-15.5) 





 


Hematocrit


 


 


 28.0 %


(36.0-47.0) 





 


Mean Corpuscular Volume   89 fL ()  


 


Mean Corpuscular Hemoglobin   30 pg (25-35)  


 


Mean Corpuscular Hemoglobin


Concent 


 


 34 g/dL


(31-37) 





 


Red Cell Distribution Width


 


 


 16.8 %


(11.5-14.5) 





 


Platelet Count


 


 


 325 x10^3/uL


(140-400) 





 


Test


 6/8/20


06:50 6/8/20


06:59 6/8/20


08:15 6/8/20


11:00


 


White Blood Count


 13.8 x10^3/uL


(4.0-11.0) 


 


 





 


Red Blood Count


 3.03 x10^6/uL


(3.50-5.40) 


 


 





 


Hemoglobin


 8.8 g/dL


(12.0-15.5) 


 


 





 


Hematocrit


 26.9 %


(36.0-47.0) 


 


 





 


Mean Corpuscular Volume 89 fL ()    


 


Mean Corpuscular Hemoglobin 29 pg (25-35)    


 


Mean Corpuscular Hemoglobin


Concent 33 g/dL


(31-37) 


 


 





 


Red Cell Distribution Width


 17.2 %


(11.5-14.5) 


 


 





 


Platelet Count


 311 x10^3/uL


(140-400) 


 


 





 


Neutrophils (%) (Auto) 88 % (31-73)    


 


Lymphocytes (%) (Auto) 7 % (24-48)    


 


Monocytes (%) (Auto) 4 % (0-9)    


 


Eosinophils (%) (Auto) 1 % (0-3)    


 


Basophils (%) (Auto) 0 % (0-3)    


 


Neutrophils # (Auto)


 12.1 x10^3/uL


(1.8-7.7) 


 


 





 


Lymphocytes # (Auto)


 0.9 x10^3/uL


(1.0-4.8) 


 


 





 


Monocytes # (Auto)


 0.6 x10^3/uL


(0.0-1.1) 


 


 





 


Eosinophils # (Auto)


 0.1 x10^3/uL


(0.0-0.7) 


 


 





 


Basophils # (Auto)


 0.0 x10^3/uL


(0.0-0.2) 


 


 





 


Segmented Neutrophils % 63 % (35-66)    


 


Band Neutrophils % 25 % (0-9)    


 


Lymphocytes % 8 % (24-48)    


 


Monocytes % 4 % (0-10)    


 


Toxic Granulation Present    


 


Dohle Bodies Present    


 


Platelet Estimate


 Adequate


(ADEQUATE) 


 


 





 


Anisocytosis Slight    


 


Sodium Level


 151 mmol/L


(136-145) 


 


 





 


Potassium Level


 4.1 mmol/L


(3.5-5.1) 


 


 





 


Chloride Level


 118 mmol/L


() 


 


 





 


Carbon Dioxide Level


 25 mmol/L


(21-32) 


 


 





 


Anion Gap 8 (6-14)    


 


Blood Urea Nitrogen


 58 mg/dL


(7-20) 


 


 





 


Creatinine


 1.3 mg/dL


(0.6-1.0) 


 


 





 


Estimated GFR


(Cockcroft-Gault) 43.5 


 


 


 





 


BUN/Creatinine Ratio 45 (6-20)    


 


Glucose Level


 165 mg/dL


(70-99) 


 


 





 


Calcium Level


 9.9 mg/dL


(8.5-10.1) 


 


 





 


Total Bilirubin


 0.5 mg/dL


(0.2-1.0) 


 


 





 


Aspartate Amino Transf


(AST/SGOT) 15 U/L (15-37) 


 


 


 





 


Alanine Aminotransferase


(ALT/SGPT) 12 U/L (14-59) 


 


 


 





 


Alkaline Phosphatase


 95 U/L


() 


 


 





 


Total Protein


 5.3 g/dL


(6.4-8.2) 


 


 





 


Albumin


 1.5 g/dL


(3.4-5.0) 


 


 





 


Albumin/Globulin Ratio 0.4 (1.0-1.7)    


 


Glucose (Fingerstick)


 


 149 mg/dL


(70-99) 


 





 


O2 Saturation   98 % (92-99)  


 


Arterial Blood pH


 


 


 7.34


(7.35-7.45) 





 


Arterial Blood pCO2 at


Patient Temp 


 


 40 mmHg


(35-46) 





 


Arterial Blood pO2 at Patient


Temp 


 


 126 mmHg


() 





 


Arterial Blood HCO3


 


 


 21 mmol/L


(21-28) 





 


Arterial Blood Base Excess


 


 


 -5 mmol/L


(-3-3) 





 


FiO2   40% bipap  


 


Stool Occult Blood    Negative (NEG) 


 


Test


 6/8/20


11:55 6/8/20


18:25 6/9/20


00:16 6/9/20


04:00


 


White Blood Count


 11.8 x10^3/uL


(4.0-11.0) 


 


 8.0 x10^3/uL


(4.0-11.0)


 


Red Blood Count


 2.93 x10^6/uL


(3.50-5.40) 


 


 2.92 x10^6/uL


(3.50-5.40)


 


Hemoglobin


 8.6 g/dL


(12.0-15.5) 


 


 8.6 g/dL


(12.0-15.5)


 


Hematocrit


 26.0 %


(36.0-47.0) 


 


 26.2 %


(36.0-47.0)


 


Mean Corpuscular Volume 89 fL ()    90 fL () 


 


Mean Corpuscular Hemoglobin 29 pg (25-35)    30 pg (25-35) 


 


Mean Corpuscular Hemoglobin


Concent 33 g/dL


(31-37) 


 


 33 g/dL


(31-37)


 


Red Cell Distribution Width


 16.8 %


(11.5-14.5) 


 


 17.4 %


(11.5-14.5)


 


Platelet Count


 292 x10^3/uL


(140-400) 


 


 285 x10^3/uL


(140-400)


 


Glucose (Fingerstick)


 203 mg/dL


(70-99) 190 mg/dL


(70-99) 126 mg/dL


(70-99) 





 


Neutrophils (%) (Auto)    77 % (31-73) 


 


Lymphocytes (%) (Auto)    16 % (24-48) 


 


Monocytes (%) (Auto)    5 % (0-9) 


 


Eosinophils (%) (Auto)    2 % (0-3) 


 


Basophils (%) (Auto)    0 % (0-3) 


 


Neutrophils # (Auto)


 


 


 


 6.1 x10^3/uL


(1.8-7.7)


 


Lymphocytes # (Auto)


 


 


 


 1.3 x10^3/uL


(1.0-4.8)


 


Monocytes # (Auto)


 


 


 


 0.4 x10^3/uL


(0.0-1.1)


 


Eosinophils # (Auto)


 


 


 


 0.2 x10^3/uL


(0.0-0.7)


 


Basophils # (Auto)


 


 


 


 0.0 x10^3/uL


(0.0-0.2)


 


Sodium Level


 


 


 


 150 mmol/L


(136-145)


 


Potassium Level


 


 


 


 4.2 mmol/L


(3.5-5.1)


 


Chloride Level


 


 


 


 118 mmol/L


()


 


Carbon Dioxide Level


 


 


 


 23 mmol/L


(21-32)


 


Anion Gap    9 (6-14) 


 


Blood Urea Nitrogen


 


 


 


 48 mg/dL


(7-20)


 


Creatinine


 


 


 


 1.1 mg/dL


(0.6-1.0)


 


Estimated GFR


(Cockcroft-Gault) 


 


 


 52.8 





 


Glucose Level


 


 


 


 165 mg/dL


(70-99)


 


Calcium Level


 


 


 


 10.3 mg/dL


(8.5-10.1)


 


Test


 6/9/20


05:24 6/9/20


06:22 


 





 


O2 Saturation 90 % (92-99)    


 


Arterial Blood pH


 7.36


(7.35-7.45) 


 


 





 


Arterial Blood pCO2 at


Patient Temp 36 mmHg


(35-46) 


 


 





 


Arterial Blood pO2 at Patient


Temp 63 mmHg


() 


 


 





 


Arterial Blood HCO3


 20 mmol/L


(21-28) 


 


 





 


Arterial Blood Base Excess


 -5 mmol/L


(-3-3) 


 


 





 


FiO2 33    


 


Glucose (Fingerstick)


 


 131 mg/dL


(70-99) 


 











Laboratory Tests








Test


 6/8/20


11:55 6/8/20


18:25 6/9/20


00:16 6/9/20


04:00


 


White Blood Count


 11.8 x10^3/uL


(4.0-11.0) 


 


 8.0 x10^3/uL


(4.0-11.0)


 


Red Blood Count


 2.93 x10^6/uL


(3.50-5.40) 


 


 2.92 x10^6/uL


(3.50-5.40)


 


Hemoglobin


 8.6 g/dL


(12.0-15.5) 


 


 8.6 g/dL


(12.0-15.5)


 


Hematocrit


 26.0 %


(36.0-47.0) 


 


 26.2 %


(36.0-47.0)


 


Mean Corpuscular Volume 89 fL ()    90 fL () 


 


Mean Corpuscular Hemoglobin 29 pg (25-35)    30 pg (25-35) 


 


Mean Corpuscular Hemoglobin


Concent 33 g/dL


(31-37) 


 


 33 g/dL


(31-37)


 


Red Cell Distribution Width


 16.8 %


(11.5-14.5) 


 


 17.4 %


(11.5-14.5)


 


Platelet Count


 292 x10^3/uL


(140-400) 


 


 285 x10^3/uL


(140-400)


 


Glucose (Fingerstick)


 203 mg/dL


(70-99) 190 mg/dL


(70-99) 126 mg/dL


(70-99) 





 


Neutrophils (%) (Auto)    77 % (31-73) 


 


Lymphocytes (%) (Auto)    16 % (24-48) 


 


Monocytes (%) (Auto)    5 % (0-9) 


 


Eosinophils (%) (Auto)    2 % (0-3) 


 


Basophils (%) (Auto)    0 % (0-3) 


 


Neutrophils # (Auto)


 


 


 


 6.1 x10^3/uL


(1.8-7.7)


 


Lymphocytes # (Auto)


 


 


 


 1.3 x10^3/uL


(1.0-4.8)


 


Monocytes # (Auto)


 


 


 


 0.4 x10^3/uL


(0.0-1.1)


 


Eosinophils # (Auto)


 


 


 


 0.2 x10^3/uL


(0.0-0.7)


 


Basophils # (Auto)


 


 


 


 0.0 x10^3/uL


(0.0-0.2)


 


Sodium Level


 


 


 


 150 mmol/L


(136-145)


 


Potassium Level


 


 


 


 4.2 mmol/L


(3.5-5.1)


 


Chloride Level


 


 


 


 118 mmol/L


()


 


Carbon Dioxide Level


 


 


 


 23 mmol/L


(21-32)


 


Anion Gap    9 (6-14) 


 


Blood Urea Nitrogen


 


 


 


 48 mg/dL


(7-20)


 


Creatinine


 


 


 


 1.1 mg/dL


(0.6-1.0)


 


Estimated GFR


(Cockcroft-Gault) 


 


 


 52.8 





 


Glucose Level


 


 


 


 165 mg/dL


(70-99)


 


Calcium Level


 


 


 


 10.3 mg/dL


(8.5-10.1)


 


Test


 6/9/20


05:24 6/9/20


06:22 


 





 


O2 Saturation 90 % (92-99)    


 


Arterial Blood pH


 7.36


(7.35-7.45) 


 


 





 


Arterial Blood pCO2 at


Patient Temp 36 mmHg


(35-46) 


 


 





 


Arterial Blood pO2 at Patient


Temp 63 mmHg


() 


 


 





 


Arterial Blood HCO3


 20 mmol/L


(21-28) 


 


 





 


Arterial Blood Base Excess


 -5 mmol/L


(-3-3) 


 


 





 


FiO2 33    


 


Glucose (Fingerstick)


 


 131 mg/dL


(70-99) 


 











Medications





Active Scripts








 Medications  Dose


 Route/Sig


 Max Daily Dose Days Date Category


 


 Bisoprolol


 Fumarate 5 Mg


 Tablet  10 Mg


 PO DAILY


   3/16/20 Reported








Comments


CXR 6/6/2020








IMPRESSION: Lines and tubes as described above. Left greater than right 


lower lobe opacities most likely pulmonary edema and left greater than 


right mild pleural effusions are stable. Superimposed left lower lobe 


pneumonia is not excluded.





ct abdomen /pelvis 6/6


1. Removal of the percutaneous pigtail drainage catheters since the prior 


exam. Sequela of pancreatitis with extensive pseudocysts again 


demonstrated, the right-sided collections are slightly larger since the 


prior exam, the left-sided collections are stable. See above.


2. Moderate to large left pleural effusion with atelectasis and collapse 


of most of the left lower lobe, stable. Small right pleural effusion is 


stable.


3. Gallstone.





Impression


.


IMPRESSION:


1.  Acute hypoxemic respiratory failure secondary to ARDS status post trach, 

developed anemia 6/7, blood drainage from RLQ abdomen drain site, and 

surrounding firmness  / developed septic shock 6/7 from abdomen source, required

levo 6/7


s/p 3 new drains 6/7 with brown color drainage,was placed on AVAPS 6/7 post 

procedure after receiving narcotics. back on TS


was On low dose levo for hypotension, now off. received aggressive IVF, improvin

g renal function 


Bloody drainage again from ROBERT drains today. increase HR





2.  Gallstone pancreatitis, now with ongoing bleeding from prior drain. Anemic. 

s/p Tx 4 units, .


3.  septic shock, recurrent 6/7, source abdomen. , off levo now, 


4.  Acute kidney injury-, Off HD--renal function decling. suspect JED on CKD due

to hypotension , improved now


5.  Acute gallstone pancreatitis.


6.  Hypoalbuminemia.


7.  Moderate persistent effusions, s/p left thora  5/12, reaccumulation of left 

effusion. O2 requirement not changed. 


8.  New Fever- ,hypotension. suspect recurrent sepsis/ likely pancreatic source.

 Per ID, per surgery--


9.  Chronic anemia-- ongoing / s/p PRBC


10. Covid 19 testing negative


11. Moderate to large ascites-S/P paracentisis


12.S/P paracentisis with 4 liters removed on 4/15/20


13. S/P IR drain placement on 5/8/2020, removal


14. Depression/Anxiety 


1





Plan


.


1. back on trach shield--  PMV/capping as tolerated/ prn suction.


2. Follow all cultures


3. Follow surgery recs-- Acute pancreatitis with persistent necrosis ---- 4/27 

status post ROBERT drain placement + C paropsilosis; 5/6 + yeast & high amylase; s/p

additional drains on 5/8, removal of drains and now increase pseudocyst and 

increase fluid collection. likely infected fluid/ sepsis. Initiated BS 

abx.follow surgery rec


4. Follow ID recs for ABX-- 


5. Follow nephrology recs --- 


6. Continue TPN 


7. monitor HGB,  4 units since 6/6


8. hold off on thoracentesis . effusion small to moderate , monitor for now


10. s/p  thoracentesis, 5/12, 3 litres removed


11. d/w IR in detail


     


DVT/GI PPX: / protonix--- holding lovenox 2/2 anemia


PT/OT


D/W SHARYN STEWART MD                  Jun 9, 2020 11:19

## 2020-06-09 NOTE — NUR
At 0300 patient began to get very agitated. She was given 25 mcg Fentanyl at about 0230 and 
her pain had not improved. To soothe anxiety I attempted to talk to her for a bit but she 
was too worked up to talk. HR was in 150s-160s and BP max was in 190s. RR was >50 breaths 
per minute and patient could not explain why she was anxious. I tried suctioning her and 
doing trach care, but neither improved patient status. Ativan was given with no improvement. 
Temperature was taken to make sure she wasn't febrile--99.6. Patient then stated that she 
had a bowel movement and needed to be repositioned. This RN went to get sheets to change her 
bed and another RN went into room to talk with her and see if she could calm her down. When 
I arrived in the room RN had checked her #1 drain and it was full of clots with blood 
pooling on bed. I had just checked drains when we turned patient at 0200 and all drains 
barely had any output. Attempted to empty drain but had to change bulb due to clots. 
Dressing changed over previous ROBERT sites that were bleeding, all drains flushed and emptied, 
patient cleaned up, and turned. Patient had a total of 240 out of drain #1, 50 out of drain 
#2, and 80 out of drain #3. After flushing drain #1, no more clots were visualized. Patient 
seemed to be getting more lethargic but said she was in 10/10 pain. 50 of Fentanyl given and 
labs sent. Patient now resting well--HR, RR, and BP all coming down. Will watch for results 
of labs and continue to monitor patient.

-------------------------------------------------------------------------------

Addendum: 06/09/20 at 0532 by TONY JAMES RN RN

-------------------------------------------------------------------------------

Hgb resulted at 8.6--unchanged from noon on 6/8. Patient's HR slowly continuing to decrease 
but patient still breathing quickly. ABG obtained and pO2 63. Patient's O2 increased to 40% 
or 10 L on trach shield.

## 2020-06-09 NOTE — NUR
RN and PT/OT assisted patient with sitting on side of bed. Patient able to sit up with 
assistance for 10 min. Tolerated well.

Patient's mother sat in room for 1+ hour. Patient's anxiety under control today. 

RN worked with patient on ROM exercises, deep breathing and importance of coughing.

## 2020-06-09 NOTE — RAD
VENOUS LOWER EXT BILATERAL

 

History: Reason: Tachycardia, off thromboprophylaxis, B/L EDEMA / Spl. 

Instructions:  / History: 

 

Comparison:  None.

 

Discussion: 

 

Multiple longitudinal and transverse high resolution real-time images of 

the venous system of bilateral lower extremity were obtained with color 

and Doppler sampling. The common femoral, superficial femoral, popliteal 

and proximal calf veins are all patent and demonstrate normal flow and 

compressibility. Normal respiratory phasicity and augmentation is present.

 

 

Impression: 

1.  No evidence of deep vein thrombosis.

 

Electronically signed by: Torrey Coyle DO (6/9/2020 10:06 AM) [id number]

## 2020-06-09 NOTE — PDOC
PROGRESS NOTES


Chief Complaint


Chief Complaint


A/P:


Acute hypoxic Respiratory failure requiring mechanical ventilation (now 

extubated for several days but still with tracheostomy)


Tracheostomy


bilateral pleural effusions/pulm edema 


Sepsis


Severe Acute gallstone pancreatitis (not a surgical candidate at this time) with

necrosis


Acute kidney failure now requiring dialysis


Salpingitis


Gallstones (Calculus of gallbladder with acute cholecystitis without 

obstruction)


HTN 


Leukocytosis 


Hypoxia


Uterine fibroid


Intractable pain


Intractable nausea


Covid 19 negative. 


Acute on chronic anemia 


EEG: No seizure activityFever  - better currently - intermittent could be from 

underlying pancreatitis blood cults 5/4 - neg so far


? Ileus with vomiting


Abd distention - U/S and CT reviewed s/p 0.4 L of opaque, debris-containing 

ascites was removed 5/6


Acute pancreatitis with persistent necrosis


- 4/27 status post ROBERT drain placement + C paropsilosis. s/p additional drains 5 /8


Anemia - S/p PRBCs


Cholelithiasis with thickening of the gallbladder wall.


Leucocytosis improving


JED, hyperkalemia, Metabolic acidosis off dialysis


Acute hypoxic resp failure ,bilateral pleural effusion and atelectasis


hypocalcemia 


Prediabetes


HTN


s/p trach


ESRD on HD


Hyperglycemia





FEN - 


PPX - SCDs, off lovenox currently


FULL CODE


Dispo - ICU, critically ill


CC time 49 minutes





History of Present Illness


History of Present Illness


6/8: IR placed drain on 6/7. 4u PRBC after Hb drop. Hb 8.8 today. Off Levophed 

this morning. T-max 100.3. Much more lethargic today. CXR with left sided dif

fuse infiltrates.





Tachycardic overnight into the 140s. NGT clamped. On BIPAP currently. Drains 

with serosanguinous discharge. WBC 8, Tmax 99.6F.





6/5/2020


Patient seen and examined on telemetry floor today


This morning I had a couple of discussions with case management


The issue is the patient will not wear her trach cap


Without that she cannot advance her diet and is currently supposed to be on 

honey thick liquids


I was going to talk to the patient about wearing her trach But when I arrived to

the room she was lying on the floor with several nurses and therapist around


Apparently she did walk to the end of the garsia then back and then collapsed onto

the floor


She had a bowel movement when this all happened


Appears to be critically ill again


Very shaky tremulous anxious has a stare in her eyes


We got her back in bed


I am extremely concerned about her long-term prognosis again











6/4/2020


Patient seen and examined in the ICU


She remains critically ill is is extremely weak


Chart reviewed


Discussed with RN


We decided to try some Prozac as she does seem depressed


On IV TPN


Still has NG tube








6/3/2020


Patient seen and examined in the ICU


She remains critically ill


We have been trying to hold off on her Ativan for the past 24 hours


She is a little more awake but shaky and agitated and anxious seems depressed


Discussed with RN


Chart reviewed








6/2/2020


Patient seen and examined in the ICU


She is a little more alert today but still quite ill


Appears clammy and pale and depressed/anxious


Discussed with RN


Chart reviewed











6/1/2020


Patient seen and examined in the ICU


She appears extremely ill


She is tachypneic at 35 respirations per minute and tachycardic at 132 bpm


She is extremely encephalopathic and shaky


She appears clammy


Chart reviewed


Discussed with RN


Prognosis extremely guarded at best








5/31/2020


Patient still in ICU


Resting with no apparent distress


Chart reviewed








5/30/2020


Patient seen and examined in the ICU


She is wiping her face with a cough


Discussed with RN


Chart reviewed


We hope to get her out of the ICU later today if possible








5/29/2020


Patient seen and examined in the ICU once again


She is back on NG suction


On IV Zosyn


Has IV TPN


Sedated with Precedex but anxious still


Appears somewhat clammy and pale


Chart reviewed


Discussed with RN


She remains critically ill














5/28/2020


Patient seen and examined in the ICU


She has an NG that is clamped we are hoping to start some clear liquids but she 

looks quite ill


She is on IV TPN


Meropenem changed to IV Zosyn (agree)


She has [place] to bedside drainage


She is semi-sedated with Precedex


Chart reviewed


Discussed with RN


She remains critically ill











Seen bedside. Hb 8. She was just a bit hypoxic, had a mucous plug suctioned. 

Able to vocalize well with speaking valve, tells me we are being very hard on 

her and she would like to be drugged back to sleep.  She wants to wake up and 

feel better. On trach shield, 


T max 100.4. afebrile this a.m.





5/26/2020


Patient seen and examined in the ICU


She is up in the chair very frail trying to talk a little shaky


Discussed with RN


Chart reviewed








5/25/2020


Patient seen and examined in the ICU


Patient up in the chair


Having a severe coughing episode with a lot of phlegm coming out of her 

tracheostomy


Discussed with RN


Discussed with physical therapy


Chart reviewed











5/24,.    feels well,  has been out of room in wheelchair


no complaint,    still weak,   some with not wanting to wear her valve on trach


cont other,   may be able to work with speech tomorrow,    ketty OK to 

try, 








5/23,  anxiety is up today,   she dislikes the valve still,    


shower and outside today


.   





5/22 she doesnt want to wear her passy-kristan valve,  discussed str and plan with 

her.


speech following,  needs swallow study,   but needs to wear her valve longer,  


cont current


able to walk some, walker 





5/21  stronger,   better,   


we discussed better oral care


she would like to try swallow study,  wants to try to eat,    speech is 

following








5/20/2020 


She remains in the ICU


sitting up and working with OT,   getting better


if limit pain meds, may do better off the vent, 





Nurses trying to suction her,  that is also improved, 


Chart reviewed


 








5/19/2020


Patient seen and examined in the ICU


She had an episode yesterday of tachycardia and severe agitation we gave her 

some Ativan


After that she seemed to have stroke symptoms but now that the Ativan has wore 

off her stroke symptoms have resolved


 


 


She is on IV meropenem and daptomycin and micafungin


Chart reviewed


Discussed with RN


Patient is still critically ill


 


BRIEF OPERATIVE NOTE


Pre-Op Diagnosis


Pancreatitis with pseudocysts, suspected infection


Post-Op Diagnosis


same


Procedure Performed


CT abdominal Drains x 3


Surgeon


Hardy


Anesthesia Type:  Conscious Sedation


Findings


3 abdominal drains, 14F, with turbid pancreatic fluid and necrotic debris in 

each.


Complications


No immediate








5/9: Patient today somewhat restless and having bilious secretions from ET tube,

imaging studies ordered, discussed with consultant. Pretty poor prognosis, h

opefully is not a fistula, poor surgical candidate. 





5/10: Imaging with no acute events, she seems more stable today compared to 

yesterday. Encouraged as much activity as possible patient at high risk for 

severe depression.





Vitals


Vitals





Vital Signs








  Date Time  Temp Pulse Resp B/P (MAP) Pulse Ox O2 Delivery O2 Flow Rate FiO2


 


6/9/20 08:00 99.5 144 49 142/74 (96) 95 Tracheal Collar 10.0 





 99.5       











Physical Exam


Physical Exam


GENERAL:lethargic, arousable though extremely weak


HEENT: NGT in place. Oral mucosa dry


NECK:  Tracheostomy 


LUNGS: Diminished aeration bases, no accessory muscle use 


HEART:  S1, S2, tachy, regular 


ABDOMEN: Mild distention, bowel sounds present, soft, grimaces to palpation 


Right lateral side drainage bag, 3 drains with bloodstained drainage


: [place] ( 6/ 7)


EXTREMITIES: Trace edema .no  cyanosis 


SKIN: no signs of gen rash 


CNS: Lethargic 


LUE-PICC (5/29) without signs of complications


General:  Alert, Cooperative


Heart:  Regular rate, Normal S1, Normal S2, No murmurs, Gallops


Lungs:  Other (diminshed in bases, Rhonci in LLL)


Abdomen:  Soft, Other (drains with bloody drainage)


Extremities:  No clubbing, No cyanosis, No edema, Normal pulses, No 

tenderness/swelling


Skin:  Other (warm, dry)





Labs


LABS





Laboratory Tests








Test


 6/8/20


11:00 6/8/20


11:55 6/8/20


18:25 6/9/20


00:16


 


Stool Occult Blood Negative (NEG)    


 


White Blood Count


 


 11.8 x10^3/uL


(4.0-11.0) 


 





 


Red Blood Count


 


 2.93 x10^6/uL


(3.50-5.40) 


 





 


Hemoglobin


 


 8.6 g/dL


(12.0-15.5) 


 





 


Hematocrit


 


 26.0 %


(36.0-47.0) 


 





 


Mean Corpuscular Volume  89 fL ()   


 


Mean Corpuscular Hemoglobin  29 pg (25-35)   


 


Mean Corpuscular Hemoglobin


Concent 


 33 g/dL


(31-37) 


 





 


Red Cell Distribution Width


 


 16.8 %


(11.5-14.5) 


 





 


Platelet Count


 


 292 x10^3/uL


(140-400) 


 





 


Glucose (Fingerstick)


 


 203 mg/dL


(70-99) 190 mg/dL


(70-99) 126 mg/dL


(70-99)


 


Test


 6/9/20


04:00 6/9/20


05:24 6/9/20


06:22 





 


White Blood Count


 8.0 x10^3/uL


(4.0-11.0) 


 


 





 


Red Blood Count


 2.92 x10^6/uL


(3.50-5.40) 


 


 





 


Hemoglobin


 8.6 g/dL


(12.0-15.5) 


 


 





 


Hematocrit


 26.2 %


(36.0-47.0) 


 


 





 


Mean Corpuscular Volume 90 fL ()    


 


Mean Corpuscular Hemoglobin 30 pg (25-35)    


 


Mean Corpuscular Hemoglobin


Concent 33 g/dL


(31-37) 


 


 





 


Red Cell Distribution Width


 17.4 %


(11.5-14.5) 


 


 





 


Platelet Count


 285 x10^3/uL


(140-400) 


 


 





 


Neutrophils (%) (Auto) 77 % (31-73)    


 


Lymphocytes (%) (Auto) 16 % (24-48)    


 


Monocytes (%) (Auto) 5 % (0-9)    


 


Eosinophils (%) (Auto) 2 % (0-3)    


 


Basophils (%) (Auto) 0 % (0-3)    


 


Neutrophils # (Auto)


 6.1 x10^3/uL


(1.8-7.7) 


 


 





 


Lymphocytes # (Auto)


 1.3 x10^3/uL


(1.0-4.8) 


 


 





 


Monocytes # (Auto)


 0.4 x10^3/uL


(0.0-1.1) 


 


 





 


Eosinophils # (Auto)


 0.2 x10^3/uL


(0.0-0.7) 


 


 





 


Basophils # (Auto)


 0.0 x10^3/uL


(0.0-0.2) 


 


 





 


Sodium Level


 150 mmol/L


(136-145) 


 


 





 


Potassium Level


 4.2 mmol/L


(3.5-5.1) 


 


 





 


Chloride Level


 118 mmol/L


() 


 


 





 


Carbon Dioxide Level


 23 mmol/L


(21-32) 


 


 





 


Anion Gap 9 (6-14)    


 


Blood Urea Nitrogen


 48 mg/dL


(7-20) 


 


 





 


Creatinine


 1.1 mg/dL


(0.6-1.0) 


 


 





 


Estimated GFR


(Cockcroft-Gault) 52.8 


 


 


 





 


Glucose Level


 165 mg/dL


(70-99) 


 


 





 


Calcium Level


 10.3 mg/dL


(8.5-10.1) 


 


 





 


O2 Saturation  90 % (92-99)   


 


Arterial Blood pH


 


 7.36


(7.35-7.45) 


 





 


Arterial Blood pCO2 at


Patient Temp 


 36 mmHg


(35-46) 


 





 


Arterial Blood pO2 at Patient


Temp 


 63 mmHg


() 


 





 


Arterial Blood HCO3


 


 20 mmol/L


(21-28) 


 





 


Arterial Blood Base Excess


 


 -5 mmol/L


(-3-3) 


 





 


FiO2  33   


 


Glucose (Fingerstick)


 


 


 131 mg/dL


(70-99) 














Assessment and Plan


Assessmemt and Plan


Problems


Medical Problems:


(1) Acute pancreatitis


Status: Acute  





(2) Cholelithiasis


Status: Acute  











Comment


Review of Relevant


I have reviewed the following items josy (where applicable) has been applied.


Labs





Laboratory Tests








Test


 6/7/20


11:30 6/7/20


13:20 6/7/20


18:05 6/7/20


18:30


 


Urine Collection Type Unknown    


 


Urine Color Yellow    


 


Urine Clarity Cloudy    


 


Urine pH 5.0 (<5.0-8.0)    


 


Urine Specific Gravity


 1.020


(1.000-1.030) 


 


 





 


Urine Protein


 30 mg/dL


(NEG-TRACE) 


 


 





 


Urine Glucose (UA)


 Negative mg/dL


(NEG) 


 


 





 


Urine Ketones (Stick)


 Negative mg/dL


(NEG) 


 


 





 


Urine Blood Negative (NEG)    


 


Urine Nitrite Negative (NEG)    


 


Urine Bilirubin Negative (NEG)    


 


Urine Urobilinogen Dipstick


 0.2 mg/dL (0.2


mg/dL) 


 


 





 


Urine Leukocyte Esterase Small (NEG)    


 


Urine RBC 0 /HPF (0-2)    


 


Urine WBC


 11-20 /HPF


(0-4) 


 


 





 


Urine Squamous Epithelial


Cells Mod /LPF 


 


 


 





 


Urine Transitional Epithelial


Cells Mod /LPF 


 


 


 





 


Urine Amorphous Sediment Present /HPF    


 


Urine Bacteria


 Few /HPF


(0-FEW) 


 


 





 


Urine Hyaline Casts Many /HPF    


 


Urine Granular Casts Many /HPF    


 


Urine Mucus Marked /LPF    


 


Urine Yeast Present /HPF    


 


White Blood Count


 


 20.9 x10^3/uL


(4.0-11.0) 


 21.2 x10^3/uL


(4.0-11.0)


 


Red Blood Count


 


 3.23 x10^6/uL


(3.50-5.40) 


 2.51 x10^6/uL


(3.50-5.40)


 


Hemoglobin


 


 9.6 g/dL


(12.0-15.5) 


 7.4 g/dL


(12.0-15.5)


 


Hematocrit


 


 28.9 %


(36.0-47.0) 


 22.6 %


(36.0-47.0)


 


Mean Corpuscular Volume  89 fL ()   90 fL () 


 


Mean Corpuscular Hemoglobin  30 pg (25-35)   30 pg (25-35) 


 


Mean Corpuscular Hemoglobin


Concent 


 33 g/dL


(31-37) 


 33 g/dL


(31-37)


 


Red Cell Distribution Width


 


 17.0 %


(11.5-14.5) 


 17.3 %


(11.5-14.5)


 


Platelet Count


 


 433 x10^3/uL


(140-400) 


 310 x10^3/uL


(140-400)


 


O2 Saturation   95 % (92-99)  


 


Arterial Blood pH


 


 


 7.25


(7.35-7.45) 





 


Arterial Blood pCO2 at


Patient Temp 


 


 48 mmHg


(35-46) 





 


Arterial Blood pO2 at Patient


Temp 


 


 92 mmHg


() 





 


Arterial Blood HCO3


 


 


 21 mmol/L


(21-28) 





 


Arterial Blood Base Excess


 


 


 -6 mmol/L


(-3-3) 





 


FiO2   50  


 


Test


 6/7/20


18:37 6/7/20


20:45 6/8/20


00:30 6/8/20


00:45


 


Glucose (Fingerstick)


 234 mg/dL


(70-99) 


 


 320 mg/dL


(70-99)


 


O2 Saturation  97 % (92-99)   


 


Arterial Blood pH


 


 7.27


(7.35-7.45) 


 





 


Arterial Blood pCO2 at


Patient Temp 


 44 mmHg


(35-46) 


 





 


Arterial Blood pO2 at Patient


Temp 


 108 mmHg


() 


 





 


Arterial Blood HCO3


 


 20 mmol/L


(21-28) 


 





 


Arterial Blood Base Excess


 


 -7 mmol/L


(-3-3) 


 





 


FiO2  40   


 


White Blood Count


 


 


 19.8 x10^3/uL


(4.0-11.0) 





 


Red Blood Count


 


 


 3.14 x10^6/uL


(3.50-5.40) 





 


Hemoglobin


 


 


 9.4 g/dL


(12.0-15.5) 





 


Hematocrit


 


 


 28.0 %


(36.0-47.0) 





 


Mean Corpuscular Volume   89 fL ()  


 


Mean Corpuscular Hemoglobin   30 pg (25-35)  


 


Mean Corpuscular Hemoglobin


Concent 


 


 34 g/dL


(31-37) 





 


Red Cell Distribution Width


 


 


 16.8 %


(11.5-14.5) 





 


Platelet Count


 


 


 325 x10^3/uL


(140-400) 





 


Test


 6/8/20


06:50 6/8/20


06:59 6/8/20


08:15 6/8/20


11:00


 


White Blood Count


 13.8 x10^3/uL


(4.0-11.0) 


 


 





 


Red Blood Count


 3.03 x10^6/uL


(3.50-5.40) 


 


 





 


Hemoglobin


 8.8 g/dL


(12.0-15.5) 


 


 





 


Hematocrit


 26.9 %


(36.0-47.0) 


 


 





 


Mean Corpuscular Volume 89 fL ()    


 


Mean Corpuscular Hemoglobin 29 pg (25-35)    


 


Mean Corpuscular Hemoglobin


Concent 33 g/dL


(31-37) 


 


 





 


Red Cell Distribution Width


 17.2 %


(11.5-14.5) 


 


 





 


Platelet Count


 311 x10^3/uL


(140-400) 


 


 





 


Neutrophils (%) (Auto) 88 % (31-73)    


 


Lymphocytes (%) (Auto) 7 % (24-48)    


 


Monocytes (%) (Auto) 4 % (0-9)    


 


Eosinophils (%) (Auto) 1 % (0-3)    


 


Basophils (%) (Auto) 0 % (0-3)    


 


Neutrophils # (Auto)


 12.1 x10^3/uL


(1.8-7.7) 


 


 





 


Lymphocytes # (Auto)


 0.9 x10^3/uL


(1.0-4.8) 


 


 





 


Monocytes # (Auto)


 0.6 x10^3/uL


(0.0-1.1) 


 


 





 


Eosinophils # (Auto)


 0.1 x10^3/uL


(0.0-0.7) 


 


 





 


Basophils # (Auto)


 0.0 x10^3/uL


(0.0-0.2) 


 


 





 


Segmented Neutrophils % 63 % (35-66)    


 


Band Neutrophils % 25 % (0-9)    


 


Lymphocytes % 8 % (24-48)    


 


Monocytes % 4 % (0-10)    


 


Toxic Granulation Present    


 


Dohle Bodies Present    


 


Platelet Estimate


 Adequate


(ADEQUATE) 


 


 





 


Anisocytosis Slight    


 


Sodium Level


 151 mmol/L


(136-145) 


 


 





 


Potassium Level


 4.1 mmol/L


(3.5-5.1) 


 


 





 


Chloride Level


 118 mmol/L


() 


 


 





 


Carbon Dioxide Level


 25 mmol/L


(21-32) 


 


 





 


Anion Gap 8 (6-14)    


 


Blood Urea Nitrogen


 58 mg/dL


(7-20) 


 


 





 


Creatinine


 1.3 mg/dL


(0.6-1.0) 


 


 





 


Estimated GFR


(Cockcroft-Gault) 43.5 


 


 


 





 


BUN/Creatinine Ratio 45 (6-20)    


 


Glucose Level


 165 mg/dL


(70-99) 


 


 





 


Calcium Level


 9.9 mg/dL


(8.5-10.1) 


 


 





 


Total Bilirubin


 0.5 mg/dL


(0.2-1.0) 


 


 





 


Aspartate Amino Transf


(AST/SGOT) 15 U/L (15-37) 


 


 


 





 


Alanine Aminotransferase


(ALT/SGPT) 12 U/L (14-59) 


 


 


 





 


Alkaline Phosphatase


 95 U/L


() 


 


 





 


Total Protein


 5.3 g/dL


(6.4-8.2) 


 


 





 


Albumin


 1.5 g/dL


(3.4-5.0) 


 


 





 


Albumin/Globulin Ratio 0.4 (1.0-1.7)    


 


Glucose (Fingerstick)


 


 149 mg/dL


(70-99) 


 





 


O2 Saturation   98 % (92-99)  


 


Arterial Blood pH


 


 


 7.34


(7.35-7.45) 





 


Arterial Blood pCO2 at


Patient Temp 


 


 40 mmHg


(35-46) 





 


Arterial Blood pO2 at Patient


Temp 


 


 126 mmHg


() 





 


Arterial Blood HCO3


 


 


 21 mmol/L


(21-28) 





 


Arterial Blood Base Excess


 


 


 -5 mmol/L


(-3-3) 





 


FiO2   40% bipap  


 


Stool Occult Blood    Negative (NEG) 


 


Test


 6/8/20


11:55 6/8/20


18:25 6/9/20


00:16 6/9/20


04:00


 


White Blood Count


 11.8 x10^3/uL


(4.0-11.0) 


 


 8.0 x10^3/uL


(4.0-11.0)


 


Red Blood Count


 2.93 x10^6/uL


(3.50-5.40) 


 


 2.92 x10^6/uL


(3.50-5.40)


 


Hemoglobin


 8.6 g/dL


(12.0-15.5) 


 


 8.6 g/dL


(12.0-15.5)


 


Hematocrit


 26.0 %


(36.0-47.0) 


 


 26.2 %


(36.0-47.0)


 


Mean Corpuscular Volume 89 fL ()    90 fL () 


 


Mean Corpuscular Hemoglobin 29 pg (25-35)    30 pg (25-35) 


 


Mean Corpuscular Hemoglobin


Concent 33 g/dL


(31-37) 


 


 33 g/dL


(31-37)


 


Red Cell Distribution Width


 16.8 %


(11.5-14.5) 


 


 17.4 %


(11.5-14.5)


 


Platelet Count


 292 x10^3/uL


(140-400) 


 


 285 x10^3/uL


(140-400)


 


Glucose (Fingerstick)


 203 mg/dL


(70-99) 190 mg/dL


(70-99) 126 mg/dL


(70-99) 





 


Neutrophils (%) (Auto)    77 % (31-73) 


 


Lymphocytes (%) (Auto)    16 % (24-48) 


 


Monocytes (%) (Auto)    5 % (0-9) 


 


Eosinophils (%) (Auto)    2 % (0-3) 


 


Basophils (%) (Auto)    0 % (0-3) 


 


Neutrophils # (Auto)


 


 


 


 6.1 x10^3/uL


(1.8-7.7)


 


Lymphocytes # (Auto)


 


 


 


 1.3 x10^3/uL


(1.0-4.8)


 


Monocytes # (Auto)


 


 


 


 0.4 x10^3/uL


(0.0-1.1)


 


Eosinophils # (Auto)


 


 


 


 0.2 x10^3/uL


(0.0-0.7)


 


Basophils # (Auto)


 


 


 


 0.0 x10^3/uL


(0.0-0.2)


 


Sodium Level


 


 


 


 150 mmol/L


(136-145)


 


Potassium Level


 


 


 


 4.2 mmol/L


(3.5-5.1)


 


Chloride Level


 


 


 


 118 mmol/L


()


 


Carbon Dioxide Level


 


 


 


 23 mmol/L


(21-32)


 


Anion Gap    9 (6-14) 


 


Blood Urea Nitrogen


 


 


 


 48 mg/dL


(7-20)


 


Creatinine


 


 


 


 1.1 mg/dL


(0.6-1.0)


 


Estimated GFR


(Cockcroft-Gault) 


 


 


 52.8 





 


Glucose Level


 


 


 


 165 mg/dL


(70-99)


 


Calcium Level


 


 


 


 10.3 mg/dL


(8.5-10.1)


 


Test


 6/9/20


05:24 6/9/20


06:22 


 





 


O2 Saturation 90 % (92-99)    


 


Arterial Blood pH


 7.36


(7.35-7.45) 


 


 





 


Arterial Blood pCO2 at


Patient Temp 36 mmHg


(35-46) 


 


 





 


Arterial Blood pO2 at Patient


Temp 63 mmHg


() 


 


 





 


Arterial Blood HCO3


 20 mmol/L


(21-28) 


 


 





 


Arterial Blood Base Excess


 -5 mmol/L


(-3-3) 


 


 





 


FiO2 33    


 


Glucose (Fingerstick)


 


 131 mg/dL


(70-99) 


 











Laboratory Tests








Test


 6/8/20


11:00 6/8/20


11:55 6/8/20


18:25 6/9/20


00:16


 


Stool Occult Blood Negative (NEG)    


 


White Blood Count


 


 11.8 x10^3/uL


(4.0-11.0) 


 





 


Red Blood Count


 


 2.93 x10^6/uL


(3.50-5.40) 


 





 


Hemoglobin


 


 8.6 g/dL


(12.0-15.5) 


 





 


Hematocrit


 


 26.0 %


(36.0-47.0) 


 





 


Mean Corpuscular Volume  89 fL ()   


 


Mean Corpuscular Hemoglobin  29 pg (25-35)   


 


Mean Corpuscular Hemoglobin


Concent 


 33 g/dL


(31-37) 


 





 


Red Cell Distribution Width


 


 16.8 %


(11.5-14.5) 


 





 


Platelet Count


 


 292 x10^3/uL


(140-400) 


 





 


Glucose (Fingerstick)


 


 203 mg/dL


(70-99) 190 mg/dL


(70-99) 126 mg/dL


(70-99)


 


Test


 6/9/20


04:00 6/9/20


05:24 6/9/20


06:22 





 


White Blood Count


 8.0 x10^3/uL


(4.0-11.0) 


 


 





 


Red Blood Count


 2.92 x10^6/uL


(3.50-5.40) 


 


 





 


Hemoglobin


 8.6 g/dL


(12.0-15.5) 


 


 





 


Hematocrit


 26.2 %


(36.0-47.0) 


 


 





 


Mean Corpuscular Volume 90 fL ()    


 


Mean Corpuscular Hemoglobin 30 pg (25-35)    


 


Mean Corpuscular Hemoglobin


Concent 33 g/dL


(31-37) 


 


 





 


Red Cell Distribution Width


 17.4 %


(11.5-14.5) 


 


 





 


Platelet Count


 285 x10^3/uL


(140-400) 


 


 





 


Neutrophils (%) (Auto) 77 % (31-73)    


 


Lymphocytes (%) (Auto) 16 % (24-48)    


 


Monocytes (%) (Auto) 5 % (0-9)    


 


Eosinophils (%) (Auto) 2 % (0-3)    


 


Basophils (%) (Auto) 0 % (0-3)    


 


Neutrophils # (Auto)


 6.1 x10^3/uL


(1.8-7.7) 


 


 





 


Lymphocytes # (Auto)


 1.3 x10^3/uL


(1.0-4.8) 


 


 





 


Monocytes # (Auto)


 0.4 x10^3/uL


(0.0-1.1) 


 


 





 


Eosinophils # (Auto)


 0.2 x10^3/uL


(0.0-0.7) 


 


 





 


Basophils # (Auto)


 0.0 x10^3/uL


(0.0-0.2) 


 


 





 


Sodium Level


 150 mmol/L


(136-145) 


 


 





 


Potassium Level


 4.2 mmol/L


(3.5-5.1) 


 


 





 


Chloride Level


 118 mmol/L


() 


 


 





 


Carbon Dioxide Level


 23 mmol/L


(21-32) 


 


 





 


Anion Gap 9 (6-14)    


 


Blood Urea Nitrogen


 48 mg/dL


(7-20) 


 


 





 


Creatinine


 1.1 mg/dL


(0.6-1.0) 


 


 





 


Estimated GFR


(Cockcroft-Gault) 52.8 


 


 


 





 


Glucose Level


 165 mg/dL


(70-99) 


 


 





 


Calcium Level


 10.3 mg/dL


(8.5-10.1) 


 


 





 


O2 Saturation  90 % (92-99)   


 


Arterial Blood pH


 


 7.36


(7.35-7.45) 


 





 


Arterial Blood pCO2 at


Patient Temp 


 36 mmHg


(35-46) 


 





 


Arterial Blood pO2 at Patient


Temp 


 63 mmHg


() 


 





 


Arterial Blood HCO3


 


 20 mmol/L


(21-28) 


 





 


Arterial Blood Base Excess


 


 -5 mmol/L


(-3-3) 


 





 


FiO2  33   


 


Glucose (Fingerstick)


 


 


 131 mg/dL


(70-99) 











Microbiology


6/7/20 Gram Stain - Final, Resulted


         


6/7/20 Aerobic and Anaerobic Culture, Resulted


         Pending


6/7/20 Blood Culture - Preliminary, Resulted


         NO GROWTH AFTER 1 DAY


5/30/20 Gram Stain - Final, Complete


          


5/30/20 Aerobic Culture - Final, Complete


          


4/12/20 Urine Culture - Final, Complete


          


4/12/20 Urine Culture Result 1 ([facility]) - Final, Complete


Medications





Current Medications


Sodium Chloride 1,000 ml @  1,000 mls/hr Q1H IV  Last administered on 3/16/20at 

03:00;  Start 3/16/20 at 03:00;  Stop 3/16/20 at 03:59;  Status DC


Ondansetron HCl (Zofran) 4 mg 1X  ONCE IVP  Last administered on 3/16/20at 

03:27;  Start 3/16/20 at 03:00;  Stop 3/16/20 at 03:01;  Status DC


Morphine Sulfate (Morphine Sulfate) 4 mg 1X  ONCE IV ;  Start 3/16/20 at 03:00; 

Stop 3/16/20 at 03:01;  Status Cancel


Ketorolac Tromethamine (Toradol 30mg Vial) 30 mg 1X  ONCE IV  Last administered 

on 3/16/20at 02:54;  Start 3/16/20 at 03:00;  Stop 3/16/20 at 03:01;  Status DC


Fentanyl Citrate (Fentanyl 2ml Vial) 25 mcg 1X  ONCE IVP  Last administered on 

3/16/20at 03:23;  Start 3/16/20 at 03:30;  Stop 3/16/20 at 03:31;  Status DC


Fentanyl Citrate (Fentanyl 2ml Vial) 100 mcg STK-MED ONCE .ROUTE ;  Start 

3/16/20 at 03:18;  Stop 3/16/20 at 03:18;  Status DC


Iohexol (Omnipaque 350 Mg/ml) 90 ml 1X  ONCE IV  Last administered on 3/16/20at 

03:25;  Start 3/16/20 at 03:30;  Stop 3/16/20 at 03:31;  Status DC


Info (CONTRAST GIVEN -- Rx MONITORING) 1 each PRN DAILY  PRN MC SEE COMMENTS;  

Start 3/16/20 at 03:30;  Stop 3/18/20 at 03:29;  Status DC


Hydromorphone HCl (Dilaudid) 0.5 mg 1X  ONCE IV  Last administered on 3/16/20at 

03:55;  Start 3/16/20 at 04:30;  Stop 3/16/20 at 04:32;  Status DC


Ondansetron HCl (Zofran) 4 mg PRN Q8HRS  PRN IV NAUSEA/VOMITING 1ST CHOICE;  

Start 3/16/20 at 05:00;  Stop 3/16/20 at 09:27;  Status DC


Morphine Sulfate (Morphine Sulfate) 2 mg PRN Q2HR  PRN IV SEVERE PAIN 7-10 Last 

administered on 3/17/20at 12:26;  Start 3/16/20 at 05:00;  Stop 3/17/20 at 

14:15;  Status DC


Sodium Chloride 1,000 ml @  125 mls/hr Q8H IV  Last administered on 3/16/20at 

20:56;  Start 3/16/20 at 05:00;  Stop 3/17/20 at 04:59;  Status DC


Hydromorphone HCl (Dilaudid) 0.5 mg PRN Q3HRS  PRN IV SEVERE PAIN 7-10 Last 

administered on 3/17/20at 10:06;  Start 3/16/20 at 05:00;  Stop 3/17/20 at 

12:01;  Status DC


Piperacillin Sod/ Tazobactam Sod 4.5 gm/Sodium Chloride 100 ml @  200 mls/hr 1X 

ONCE IV  Last administered on 3/16/20at 05:44;  Start 3/16/20 at 06:00;  Stop 

3/16/20 at 06:29;  Status DC


Ondansetron HCl (Zofran) 4 mg PRN Q4HRS  PRN IV NAUSEA/VOMITING 1ST CHOICE Last 

administered on 6/8/20at 21:08;  Start 3/16/20 at 09:30


Insulin Human Lispro (HumaLOG) 0-9 UNITS Q6HRS SQ  Last administered on 6/8/20at

18:27;  Start 3/16/20 at 09:30


Dextrose (Dextrose 50%-Water Syringe) 12.5 gm PRN Q15MIN  PRN IV SEE COMMENTS;  

Start 3/16/20 at 09:30


Pantoprazole Sodium (PROTONIX VIAL for IV PUSH) 40 mg DAILYAC IVP  Last 

administered on 6/8/20at 07:46;  Start 3/16/20 at 11:30


Prochlorperazine Edisylate (Compazine) 10 mg PRN Q6HRS  PRN IV NAUSEA/VOMITING, 

2nd CHOICE Last administered on 6/9/20at 03:45;  Start 3/16/20 at 17:45


Atenolol (Tenormin) 100 mg DAILY PO ;  Start 3/17/20 at 09:00;  Stop 3/16/20 at 

20:08;  Status DC


Metoprolol Tartrate (Lopressor Vial) 2.5 mg Q6HRS IVP  Last administered on 

3/17/20at 05:51;  Start 3/16/20 at 20:15;  Stop 3/17/20 at 10:02;  Status DC


Metoprolol Tartrate (Lopressor Vial) 5 mg Q6HRS IVP  Last administered on 

3/26/20at 00:12;  Start 3/17/20 at 10:15;  Stop 3/28/20 at 08:48;  Status DC


Hydromorphone HCl (Dilaudid) 1 mg PRN Q3HRS  PRN IV SEVERE PAIN 7-10 Last 

administered on 3/23/20at 05:13;  Start 3/17/20 at 12:00;  Stop 3/31/20 at 

00:25;  Status DC


Lidocaine HCl (Buffered Lidocaine 1%) 3 ml STK-MED ONCE .ROUTE ;  Start 3/17/20 

at 12:55;  Stop 3/17/20 at 12:56;  Status DC


Albumin Human 500 ml @  125 mls/hr 1X  ONCE IV  Last administered on 3/17/20at 

14:33;  Start 3/17/20 at 14:30;  Stop 3/17/20 at 18:32;  Status DC


Norepinephrine Bitartrate 8 mg/ Dextrose 258 ml @  17.299 mls/ hr CONT  PRN IV 

PER PROTOCOL Last administered on 4/14/20at 12:48;  Start 3/17/20 at 15:30;  

Stop 4/17/20 at 09:19;  Status DC


Sodium Chloride 1,000 ml @  125 mls/hr Q8H IV  Last administered on 3/17/20at 

21:04;  Start 3/17/20 at 16:00;  Stop 3/18/20 at 02:42;  Status DC


Albumin Human 500 ml @  125 mls/hr PRN BID  PRN IV After every 2L NSS & BP < 

90mm Last administered on 6/6/20at 11:40;  Start 3/17/20 at 16:00


Iohexol (Omnipaque 300 Mg/ml) 60 ml 1X  ONCE IV  Last administered on 3/17/20at 

17:20;  Start 3/17/20 at 17:00;  Stop 3/17/20 at 17:01;  Status DC


Info (CONTRAST GIVEN -- Rx MONITORING) 1 each PRN DAILY  PRN MC SEE COMMENTS;  

Start 3/17/20 at 17:00;  Stop 3/19/20 at 16:59;  Status DC


Meropenem 1 gm/ Sodium Chloride 100 ml @  200 mls/hr Q8HRS IV  Last administered

on 3/18/20at 05:45;  Start 3/17/20 at 20:00;  Stop 3/18/20 at 08:48;  Status DC


Furosemide (Lasix) 40 mg 1X  ONCE IVP  Last administered on 3/17/20at 22:12;  

Start 3/17/20 at 22:30;  Stop 3/17/20 at 22:31;  Status DC


Calcium Chloride 1000 mg/Sodium Chloride 110 ml @  220 mls/hr 1X  ONCE IV  Last 

administered on 3/17/20at 22:11;  Start 3/17/20 at 22:30;  Stop 3/17/20 at 

22:59;  Status DC


Albuterol Sulfate (Ventolin Neb Soln) 2.5 mg 1X  ONCE NEB  Last administered on 

3/18/20at 00:56;  Start 3/17/20 at 22:30;  Stop 3/17/20 at 22:31;  Status DC


Insulin Human Regular (HumuLIN R VIAL) 5 unit 1X  ONCE IV  Last administered on 

3/17/20at 22:14;  Start 3/17/20 at 22:30;  Stop 3/17/20 at 22:31;  Status DC


Magnesium Sulfate 50 ml @ 25 mls/hr 1X  ONCE IV  Last administered on 3/18/20at 

02:57;  Start 3/18/20 at 03:00;  Stop 3/18/20 at 04:59;  Status DC


Calcium Gluconate 1000 mg/Sodium Chloride 110 ml @  220 mls/hr 1X  ONCE IV  Last

administered on 3/18/20at 02:46;  Start 3/18/20 at 03:00;  Stop 3/18/20 at 

03:29;  Status DC


Sodium Chloride 1,000 ml @  200 mls/hr Q5H IV  Last administered on 3/18/20at 

02:46;  Start 3/18/20 at 03:00;  Stop 3/18/20 at 10:21;  Status DC


Calcium Gluconate 1000 mg/Sodium Chloride 110 ml @  220 mls/hr 1X  ONCE IV  Last

administered on 3/18/20at 03:21;  Start 3/18/20 at 03:30;  Stop 3/18/20 at 

03:59;  Status DC


Sodium Bicarbonate 50 meq/Sodium Chloride 1,050 ml @  75 mls/hr Q14H IV  Last 

administered on 3/22/20at 21:10;  Start 3/18/20 at 07:30;  Stop 3/23/20 at 

10:28;  Status DC


Calcium Gluconate 2000 mg/Sodium Chloride 120 ml @  220 mls/hr 1X  ONCE IV  Last

administered on 3/18/20at 09:05;  Start 3/18/20 at 07:30;  Stop 3/18/20 at 0

8:02;  Status DC


Lidocaine HCl (Xylocaine-Mpf 1% 2ml Vial) 2 ml STK-MED ONCE .ROUTE ;  Start 

3/18/20 at 08:47;  Stop 3/18/20 at 08:47;  Status DC


Meropenem 500 mg/ Sodium Chloride 50 ml @  100 mls/hr Q12HR IV  Last 

administered on 3/23/20at 21:01;  Start 3/18/20 at 18:00;  Stop 3/24/20 at 

07:58;  Status DC


Lidocaine HCl (Buffered Lidocaine 1%) 3 ml STK-MED ONCE .ROUTE ;  Start 3/18/20 

at 09:46;  Stop 3/18/20 at 09:46;  Status DC


Lidocaine HCl (Buffered Lidocaine 1%) 6 ml 1X  ONCE INJ  Last administered on 3

/18/20at 10:26;  Start 3/18/20 at 10:15;  Stop 3/18/20 at 10:16;  Status DC


Info (Tpn Per Pharmacy) 1 each PRN DAILY  PRN MC SEE COMMENTS Last administered 

on 6/8/20at 13:14;  Start 3/18/20 at 12:00


Sodium Chloride 1,000 ml @  1,000 mls/hr Q1H PRN IV hypotension;  Start 3/18/20 

at 12:07;  Stop 3/18/20 at 18:06;  Status DC


Diphenhydramine HCl (Benadryl) 25 mg 1X PRN  PRN IV ITCHING;  Start 3/18/20 at 

12:15;  Stop 3/19/20 at 12:14;  Status DC


Diphenhydramine HCl (Benadryl) 25 mg 1X PRN  PRN IV ITCHING;  Start 3/18/20 at 

12:15;  Stop 3/19/20 at 12:14;  Status DC


Sodium Chloride 1,000 ml @  400 mls/hr Q2H30M PRN IV PATENCY;  Start 3/18/20 at 

12:07;  Stop 3/19/20 at 00:06;  Status DC


Info (PHARMACY MONITORING -- do not chart) 1 each PRN DAILY  PRN MC SEE COMMENTS

;  Start 3/18/20 at 12:15;  Stop 3/20/20 at 08:13;  Status DC


Sodium Chloride 90 meq/Calcium Gluconate 10 meq/ Multivitamins 10 ml/Chromium/ 

Copper/Manganese/ Seleni/Zn 1 ml/ Total Parenteral Nutrition/Amino 

Acids/Dextrose/ Fat Emulsion Intravenous 55.005 ml  @ 2.292 mls/hr TPN  CONT IV 

;  Start 3/18/20 at 22:00;  Stop 3/18/20 at 12:33;  Status DC


Info (Tpn Per Pharmacy) 1 each PRN DAILY  PRN MC SEE COMMENTS;  Start 3/18/20 at

12:30;  Status UNV


Sodium Chloride 90 meq/Calcium Gluconate 10 meq/ Multivitamins 10 ml/Chromium/ 

Copper/Manganese/ Seleni/Zn 0.5 ml/ Total Parenteral Nutrition/Amino 

Acids/Dextrose/ Fat Emulsion Intravenous 1,512 ml @  63 mls/hr TPN  CONT IV  

Last administered on 3/18/20at 22:06;  Start 3/18/20 at 22:00;  Stop 3/19/20 at 

21:59;  Status DC


Calcium Carbonate/ Glycine (Tums) 500 mg PRN AFTMEALHC  PRN PO INDIGESTION;  

Start 3/18/20 at 17:45;  Stop 5/13/20 at 10:25;  Status DC


Calcium Gluconate (Calcium Gluconate) 2,000 mg 1X  ONCE IVP  Last administered 

on 3/19/20at 02:19;  Start 3/19/20 at 02:15;  Stop 3/19/20 at 02:16;  Status DC


Calcium Chloride 3000 mg/Sodium Chloride 1,030 ml @  50 mls/hr [id number] IV  Last 

administered on 3/21/20at 02:17;  Start 3/19/20 at 08:00;  Stop 3/21/20 at 

15:23;  Status DC


Lorazepam (Ativan Inj) 1 mg PRN Q4HRS  PRN IVP ANXIETY / AGITATION, 2nd choic 

Last administered on 4/17/20at 03:51;  Start 3/19/20 at 09:00;  Stop 4/17/20 at 

09:19;  Status DC


Sodium Chloride 1,000 ml @  1,000 mls/hr Q1H PRN IV hypotension;  Start 3/19/20 

at 08:56;  Stop 3/19/20 at 14:55;  Status DC


Albumin Human 200 ml @  200 mls/hr 1X PRN  PRN IV Hypotension;  Start 3/19/20 at

09:00;  Stop 3/19/20 at 14:59;  Status DC


Diphenhydramine HCl (Benadryl) 25 mg 1X PRN  PRN IV ITCHING;  Start 3/19/20 at 

09:00;  Stop 3/20/20 at 08:59;  Status DC


Diphenhydramine HCl (Benadryl) 25 mg 1X PRN  PRN IV ITCHING;  Start 3/19/20 at 

09:00;  Stop 3/20/20 at 08:59;  Status DC


Sodium Chloride 1,000 ml @  400 mls/hr Q2H30M PRN IV PATENCY;  Start 3/19/20 at 

08:56;  Stop 3/19/20 at 20:55;  Status DC


Info (PHARMACY MONITORING -- do not chart) 1 each PRN DAILY  PRN MC SEE 

COMMENTS;  Start 3/19/20 at 09:00;  Status UNV


Info (PHARMACY MONITORING -- do not chart) 1 each PRN DAILY  PRN MC SEE 

COMMENTS;  Start 3/19/20 at 09:00;  Stop 3/20/20 at 08:13;  Status DC


Digoxin (Lanoxin) 500 mcg 1X  ONCE IV  Last administered on 3/19/20at 10:04;  

Start 3/19/20 at 10:00;  Stop 3/19/20 at 10:01;  Status DC


Digoxin (Lanoxin) 125 mcg 1X  ONCE IV  Last administered on 3/19/20at 17:10;  

Start 3/19/20 at 18:00;  Stop 3/19/20 at 18:01;  Status DC


Magnesium Sulfate 100 ml @  25 mls/hr 1X  ONCE IV  Last administered on 

3/19/20at 12:48;  Start 3/19/20 at 13:00;  Stop 3/19/20 at 16:59;  Status DC


Sodium Chloride 90 meq/Magnesium Sulfate 10 meq/ Calcium Gluconate 20 meq/ 

Multivitamins 10 ml/Chromium/ Copper/Manganese/ Seleni/Zn 0.5 ml/ Total 

Parenteral Nutrition/Amino Acids/Dextrose/ Fat Emulsion Intravenous 1,512 ml @  

63 mls/hr TPN  CONT IV  Last administered on 3/19/20at 22:25;  Start 3/19/20 at 

22:00;  Stop 3/20/20 at 21:59;  Status DC


Sodium Chloride 1,000 ml @  1,000 mls/hr Q1H PRN IV hypotension;  Start 3/20/20 

at 08:05;  Stop 3/20/20 at 14:04;  Status DC


Albumin Human 200 ml @  200 mls/hr 1X  ONCE IV  Last administered on 3/20/20at 

08:57;  Start 3/20/20 at 08:15;  Stop 3/20/20 at 09:14;  Status DC


Diphenhydramine HCl (Benadryl) 25 mg 1X PRN  PRN IV ITCHING;  Start 3/20/20 at 

08:15;  Stop 3/21/20 at 08:14;  Status DC


Diphenhydramine HCl (Benadryl) 25 mg 1X PRN  PRN IV ITCHING;  Start 3/20/20 at 

08:15;  Stop 3/21/20 at 08:14;  Status DC


Sodium Chloride 1,000 ml @  400 mls/hr Q2H30M PRN IV PATENCY;  Start 3/20/20 at 

08:05;  Stop 3/20/20 at 20:04;  Status DC


Info (PHARMACY MONITORING -- do not chart) 1 each PRN DAILY  PRN MC SEE 

COMMENTS;  Start 3/20/20 at 08:15;  Stop 3/24/20 at 07:57;  Status DC


Sodium Chloride 90 meq/Potassium Chloride 15 meq/ Potassium Phosphate 10 mmol/ 

Magnesium Sulfate 10 meq/Calcium Gluconate 20 meq/ Multivitamins 10 ml/Chromium/

Copper/Manganese/ Seleni/Zn 0.5 ml/ Total Parenteral Nutrition/Amino 

Acids/Dextrose/ Fat Emulsion Intravenous 1,512 ml @  63 mls/hr TPN  CONT IV  

Last administered on 3/20/20at 21:01;  Start 3/20/20 at 22:00;  Stop 3/21/20 at 

21:59;  Status DC


Potassium Chloride/Water 100 ml @  100 mls/hr 1X  ONCE IV  Last administered on 

3/20/20at 14:09;  Start 3/20/20 at 14:00;  Stop 3/20/20 at 14:59;  Status DC


Benzocaine (Hurricaine One) 1 spray 1X  ONCE MM  Last administered on 3/20/20at 

16:38;  Start 3/20/20 at 14:30;  Stop 3/20/20 at 14:31;  Status DC


Lidocaine HCl (Glydo (Lidocaine) Jelly) 1 thomas 1X  ONCE MM  Last administered on 

3/20/20at 16:38;  Start 3/20/20 at 14:30;  Stop 3/20/20 at 14:31;  Status DC


Linezolid/Dextrose 300 ml @  300 mls/hr Q12HR IV  Last administered on 3/26/20at

21:04;  Start 3/20/20 at 20:00;  Stop 3/27/20 at 07:50;  Status DC


Acetaminophen (Tylenol) 650 mg PRN Q6HRS  PRN PO MILD PAIN / TEMP;  Start 

3/21/20 at 03:30;  Stop 3/21/20 at 03:36;  Status DC


Acetaminophen (Tylenol) 650 mg PRN Q6HRS  PRN PEG MILD PAIN / TEMP Last 

administered on 4/16/20at 19:56;  Start 3/21/20 at 03:36;  Stop 5/13/20 at 

10:25;  Status DC


Sodium Chloride 1,000 ml @  1,000 mls/hr Q1H PRN IV hypotension;  Start 3/21/20 

at 07:50;  Stop 3/21/20 at 13:49;  Status DC


Albumin Human 200 ml @  200 mls/hr 1X PRN  PRN IV Hypotension;  Start 3/21/20 at

08:00;  Stop 3/21/20 at 13:59;  Status DC


Sodium Chloride (Normal Saline Flush) 10 ml 1X PRN  PRN IV AP catheter pack;  

Start 3/21/20 at 08:00;  Stop 3/22/20 at 07:59;  Status DC


Sodium Chloride (Normal Saline Flush) 10 ml 1X PRN  PRN IV  catheter pack;  

Start 3/21/20 at 08:00;  Stop 3/22/20 at 07:59;  Status DC


Sodium Chloride 1,000 ml @  400 mls/hr Q2H30M PRN IV PATENCY;  Start 3/21/20 at 

07:50;  Stop 3/21/20 at 19:49;  Status DC


Info (PHARMACY MONITORING -- do not chart) 1 each PRN DAILY  PRN MC SEE 

COMMENTS;  Start 3/21/20 at 08:00;  Status UNV


Info (PHARMACY MONITORING -- do not chart) 1 each PRN DAILY  PRN MC SEE 

COMMENTS;  Start 3/21/20 at 08:00;  Stop 3/23/20 at 08:25;  Status DC


Sodium Chloride 90 meq/Potassium Chloride 15 meq/ Potassium Phosphate 10 mmol/ 

Magnesium Sulfate 10 meq/Calcium Gluconate 20 meq/ Multivitamins 10 ml/Chromium/

Copper/Manganese/ Seleni/Zn 0.5 ml/ Total Parenteral Nutrition/Amino 

Acids/Dextrose/ Fat Emulsion Intravenous 1,512 ml @  63 mls/hr TPN  CONT IV  

Last administered on 3/21/20at 20:57;  Start 3/21/20 at 22:00;  Stop 3/22/20 at 

21:59;  Status DC


Sodium Chloride 90 meq/Potassium Chloride 15 meq/ Potassium Phosphate 15 mmol/ 

Magnesium Sulfate 10 meq/Calcium Gluconate 20 meq/ Multivitamins 10 ml/Chromium/

Copper/Manganese/ Seleni/Zn 0.5 ml/ Total Parenteral Nutrition/Amino 

Acids/Dextrose/ Fat Emulsion Intravenous 1,512 ml @  63 mls/hr TPN  CONT IV ;  

Start 3/22/20 at 22:00;  Stop 3/22/20 at 14:16;  Status DC


Sodium Chloride 90 meq/Potassium Chloride 15 meq/ Potassium Phosphate 15 mmol/ 

Magnesium Sulfate 10 meq/Calcium Gluconate 20 meq/ Multivitamins 10 ml/Chromium/

Copper/Manganese/ Seleni/Zn 0.5 ml/ Total Parenteral Nutrition/Amino 

Acids/Dextrose/ Fat Emulsion Intravenous 1,200 ml @  50 mls/hr TPN  CONT IV ;  

Start 3/22/20 at 22:00;  Stop 3/22/20 at 14:17;  Status DC


Sodium Chloride 90 meq/Potassium Chloride 15 meq/ Potassium Phosphate 10 mmol/ 

Magnesium Sulfate 10 meq/Calcium Gluconate 20 meq/ Multivitamins 10 ml/Chromium/

Copper/Manganese/ Seleni/Zn 0.5 ml/ Total Parenteral Nutrition/Amino Acids/De

xtrose/ Fat Emulsion Intravenous 1,200 ml @  50 mls/hr TPN  CONT IV  Last 

administered on 3/22/20at 23:29;  Start 3/22/20 at 22:00;  Stop 3/23/20 at 

21:59;  Status DC


Sodium Chloride 1,000 ml @  1,000 mls/hr Q1H PRN IV hypotension;  Start 3/23/20 

at 07:28;  Stop 3/23/20 at 13:27;  Status DC


Albumin Human 200 ml @  200 mls/hr 1X  ONCE IV  Last administered on 3/23/20at 

08:51;  Start 3/23/20 at 07:30;  Stop 3/23/20 at 08:29;  Status DC


Diphenhydramine HCl (Benadryl) 25 mg 1X PRN  PRN IV ITCHING;  Start 3/23/20 at 

07:30;  Stop 3/24/20 at 07:29;  Status DC


Diphenhydramine HCl (Benadryl) 25 mg 1X PRN  PRN IV ITCHING;  Start 3/23/20 at 

07:30;  Stop 3/24/20 at 07:29;  Status DC


Sodium Chloride 1,000 ml @  400 mls/hr Q2H30M PRN IV PATENCY;  Start 3/23/20 at 

07:28;  Stop 3/23/20 at 19:27;  Status DC


Info (PHARMACY MONITORING -- do not chart) 1 each PRN DAILY  PRN MC SEE 

COMMENTS;  Start 3/23/20 at 07:30;  Stop 4/3/20 at 13:01;  Status DC


Metronidazole 100 ml @  100 mls/hr Q6HRS IV  Last administered on 4/8/20at 

06:26;  Start 3/23/20 at 08:30;  Stop 4/8/20 at 09:58;  Status DC


Micafungin Sodium 100 mg/Dextrose 100 ml @  100 mls/hr Q24H IV  Last 

administered on 4/30/20at 08:18;  Start 3/23/20 at 09:00;  Stop 4/30/20 at 

20:58;  Status DC


Propofol 0 ml @ As Directed STK-MED ONCE IV ;  Start 3/23/20 at 07:53;  Stop 

3/23/20 at 07:53;  Status DC


Etomidate (Amidate) 20 mg STK-MED ONCE IV ;  Start 3/23/20 at 07:53;  Stop 

3/23/20 at 07:54;  Status DC


Midazolam HCl (Versed) 5 mg STK-MED ONCE .ROUTE ;  Start 3/23/20 at 07:57;  Stop

3/23/20 at 07:57;  Status DC


Fentanyl Citrate 30 ml @ 0 mls/hr CONT  PRN IV SEE PROTOCOL Last administered on

4/17/20at 06:12;  Start 3/23/20 at 08:15;  Stop 4/17/20 at 09:19;  Status DC


Artificial Tears (Artificial Tears) 1 drop PRN Q1HR  PRN OU DRY EYE, 1st choice;

 Start 3/23/20 at 08:15;  Stop 4/29/20 at 05:31;  Status DC


Midazolam HCl 50 mg/Sodium Chloride 50 ml @ 0 mls/hr CONT  PRN IV SEE PROTOCOL 

Last administered on 3/26/20at 22:39;  Start 3/23/20 at 08:15;  Stop 3/28/20 at 

15:59;  Status DC


Etomidate (Amidate) 8 mg 1X  ONCE IV  Last administered on 3/23/20at 08:33;  

Start 3/23/20 at 08:30;  Stop 3/23/20 at 08:31;  Status DC


Succinylcholine Chloride (Anectine) 120 mg 1X  ONCE IV  Last administered on 

3/23/20at 08:34;  Start 3/23/20 at 08:30;  Stop 3/23/20 at 08:31;  Status DC


Midazolam HCl (Versed) 5 mg 1X  ONCE IV ;  Start 3/23/20 at 08:30;  Stop 3/23/20

at 08:31;  Status DC


Potassium Chloride 15 meq/ Bicarbonate Dialysis Soln w/ out KCl 5,007.5 ml  @ 

1,000 mls/ hr Q5H1M IV  Last administered on 3/24/20at 11:11;  Start 3/23/20 at 

12:00;  Stop 3/24/20 at 11:15;  Status DC


Potassium Chloride 15 meq/ Bicarbonate Dialysis Soln w/ out KCl 5,007.5 ml  @ 

1,000 mls/ hr Q5H1M IV  Last administered on 3/24/20at 11:12;  Start 3/23/20 at 

12:00;  Stop 3/24/20 at 11:17;  Status DC


Potassium Chloride 15 meq/ Bicarbonate Dialysis Soln w/ out KCl 5,007.5 ml  @ 

1,000 mls/ hr Q5H1M IV  Last administered on 3/24/20at 11:11;  Start 3/23/20 at 

12:00;  Stop 3/24/20 at 11:19;  Status DC


Sodium Chloride 90 meq/Potassium Chloride 15 meq/ Potassium Phosphate 10 mmol/ 

Magnesium Sulfate 10 meq/Calcium Gluconate 20 meq/ Multivitamins 10 ml/Chromium/

Copper/Manganese/ Seleni/Zn 0.5 ml/ Total Parenteral Nutrition/Amino 

Acids/Dextrose/ Fat Emulsion Intravenous 1,400 ml @  58.333 mls/ hr TPN  CONT IV

 Last administered on 3/23/20at 21:42;  Start 3/23/20 at 22:00;  Stop 3/24/20 at

21:59;  Status DC


Heparin Sodium (Porcine) (Heparin Sodium) 5,000 unit Q8HRS SQ  Last administered

on 3/28/20at 05:55;  Start 3/23/20 at 15:00;  Stop 3/28/20 at 13:28;  Status DC


Meropenem 500 mg/ Sodium Chloride 50 ml @  100 mls/hr Q6HRS IV  Last 

administered on 3/25/20at 06:00;  Start 3/24/20 at 09:00;  Stop 3/25/20 at 

07:29;  Status DC


Potassium Phosphate 20 mmol/ Sodium Chloride 106.6667 ml @  51.667 m... 1X  ONCE

IV  Last administered on 3/24/20at 11:22;  Start 3/24/20 at 10:15;  Stop 3/24/20

at 12:18;  Status DC


Acetaminophen (Tylenol Supp) 650 mg PRN Q6HRS  PRN [place] MILD PAIN / TEMP > 100.3'F

Last administered on 6/7/20at 14:41;  Start 3/24/20 at 10:30


Potassium Chloride/Water 100 ml @  100 mls/hr Q1H IV  Last administered on 

3/24/20at 12:12;  Start 3/24/20 at 11:00;  Stop 3/24/20 at 12:59;  Status DC


Potassium Chloride 20 meq/ Bicarbonate Dialysis Soln w/ out KCl 5,010 ml @  

1,000 mls/hr Q5H1M IV  Last administered on 3/25/20at 08:48;  Start 3/24/20 at 

12:00;  Stop 3/25/20 at 13:03;  Status DC


Potassium Chloride 20 meq/ Bicarbonate Dialysis Soln w/ out KCl 5,010 ml @  

1,000 mls/hr Q5H1M IV  Last administered on 3/29/20at 14:52;  Start 3/24/20 at 

11:30;  Stop 3/29/20 at 19:59;  Status DC


Potassium Chloride 20 meq/ Bicarbonate Dialysis Soln w/ out KCl 5,010 ml @  

1,000 mls/hr Q5H1M IV  Last administered on 3/29/20at 14:53;  Start 3/24/20 at 

11:30;  Stop 3/29/20 at 19:59;  Status DC


Sodium Chloride 90 meq/Potassium Chloride 15 meq/ Potassium Phosphate 15 mmol/ 

Magnesium Sulfate 10 meq/Calcium Gluconate 15 meq/ Multivitamins 10 ml/Chromium/

Copper/Manganese/ Seleni/Zn 0.5 ml/ Total Parenteral Nutrition/Amino 

Acids/Dextrose/ Fat Emulsion Intravenous 1,400 ml @  58.333 mls/ hr TPN  CONT IV

 Last administered on 3/24/20at 22:17;  Start 3/24/20 at 22:00;  Stop 3/25/20 at

21:59;  Status DC


Cefepime HCl (Maxipime) 2 gm Q12HR IVP  Last administered on 4/7/20at 20:56;  

Start 3/25/20 at 09:00;  Stop 4/8/20 at 09:58;  Status DC


Daptomycin 500 mg/ Sodium Chloride 50 ml @  100 mls/hr Q48H IV  Last 

administered on 4/10/20at 09:57;  Start 3/25/20 at 08:30;  Stop 4/10/20 at 

10:07;  Status DC


Lidocaine HCl (Buffered Lidocaine 1%) 3 ml 1X  ONCE INJ  Last administered on 

3/25/20at 10:27;  Start 3/25/20 at 10:30;  Stop 3/25/20 at 10:31;  Status DC


Potassium Phosphate 20 mmol/ Sodium Chloride 106.6667 ml @  51.667 m... 1X  ONCE

IV  Last administered on 3/25/20at 12:51;  Start 3/25/20 at 13:00;  Stop 3/25/20

at 15:03;  Status DC


Sodium Chloride 90 meq/Potassium Chloride 15 meq/ Potassium Phosphate 18 mmol/ 

Magnesium Sulfate 8 meq/Calcium Gluconate 15 meq/ Multivitamins 10 ml/Chromium/ 

Copper/Manganese/ Seleni/Zn 0.5 ml/ Total Parenteral Nutrition/Amino 

Acids/Dextrose/ Fat Emulsion Intravenous 1,400 ml @  58.333 mls/ hr TPN  CONT IV

 Last administered on 3/25/20at 22:16;  Start 3/25/20 at 22:00;  Stop 3/26/20 at

21:59;  Status DC


Potassium Chloride 20 meq/ Bicarbonate Dialysis Soln w/ out KCl 5,010 ml @  

1,000 mls/hr Q5H1M IV  Last administered on 3/29/20at 14:54;  Start 3/25/20 at 

16:00;  Stop 3/29/20 at 19:59;  Status DC


Multi-Ingred Cream/Lotion/Oil/ Oint (Artificial Tears Eye Ointment) 1 thomas PRN 

Q1HR  PRN OU DRY EYE, 2nd choice Last administered on 4/13/20at 08:19;  Start 

3/25/20 at 17:30;  Stop 6/3/20 at 14:39;  Status DC


Sodium Chloride 90 meq/Potassium Chloride 15 meq/ Potassium Phosphate 18 mmol/ 

Magnesium Sulfate 8 meq/Calcium Gluconate 15 meq/ Multivitamins 10 ml/Chromium/ 

Copper/Manganese/ Seleni/Zn 0.5 ml/ Total Parenteral Nutrition/Amino 

Acids/Dextrose/ Fat Emulsion Intravenous 1,400 ml @  58.333 mls/ hr TPN  CONT IV

 Last administered on 3/26/20at 22:00;  Start 3/26/20 at 22:00;  Stop 3/27/20 at

21:59;  Status DC


Albumin Human 500 ml @  125 mls/hr 1X  ONCE IV ;  Start 3/26/20 at 14:15;  Stop 

3/26/20 at 18:14;  Status DC


Sodium Chloride 90 meq/Potassium Chloride 15 meq/ Potassium Phosphate 18 mmol/ 

Magnesium Sulfate 8 meq/Calcium Gluconate 15 meq/ Multivitamins 10 ml/Chromium/ 

Copper/Manganese/ Seleni/Zn 0.5 ml/ Insulin Human Regular 10 unit/ Total 

Parenteral Nutrition/Amino Acids/Dextrose/ Fat Emulsion Intravenous 1,400 ml @  

58.333 mls/ hr TPN  CONT IV  Last administered on 3/27/20at 21:43;  Start 

3/27/20 at 22:00;  Stop 3/28/20 at 21:59;  Status DC


Lidocaine HCl (Buffered Lidocaine 1%) 3 ml STK-MED ONCE .ROUTE ;  Start 3/25/20 

at 10:00;  Stop 3/27/20 at 13:57;  Status DC


Midazolam HCl 100 mg/Sodium Chloride 100 ml @ 7 mls/hr CONT  PRN IV SEE PROTOCOL

Last administered on 4/8/20at 15:35;  Start 3/28/20 at 16:00;  Stop 6/3/20 at 

14:38;  Status DC


Sodium Chloride 90 meq/Potassium Chloride 15 meq/ Potassium Phosphate 18 mmol/ 

Magnesium Sulfate 8 meq/Calcium Gluconate 15 meq/ Multivitamins 10 ml/Chromium/ 

Copper/Manganese/ Seleni/Zn 0.5 ml/ Insulin Human Regular 15 unit/ Total 

Parenteral Nutrition/Amino Acids/Dextrose/ Fat Emulsion Intravenous 1,400 ml @  

58.333 mls/ hr TPN  CONT IV  Last administered on 3/28/20at 20:34;  Start 

3/28/20 at 22:00;  Stop 3/29/20 at 21:59;  Status DC


Info ([facility] Electrolyte Protocol) 1 ea CONT PRN  PRN MC PER PROTOCOL;  Start 

3/29/20 at 13:15


Sodium Chloride 90 meq/Potassium Chloride 15 meq/ Potassium Phosphate 18 mmol/ 

Magnesium Sulfate 8 meq/Calcium Gluconate 15 meq/ Multivitamins 10 ml/Chromium/ 

Copper/Manganese/ Seleni/Zn 0.5 ml/ Insulin Human Regular 15 unit/ Total 

Parenteral Nutrition/Amino Acids/Dextrose/ Fat Emulsion Intravenous 1,400 ml @  

58.333 mls/ hr TPN  CONT IV  Last administered on 3/29/20at 22:05;  Start 

3/29/20 at 22:00;  Stop 3/30/20 at 21:59;  Status DC


Potassium Chloride 15 meq/ Bicarbonate Dialysis Soln w/ out KCl 5,007.5 ml  @ 

1,000 mls/ hr Q5H1M IV  Last administered on 4/1/20at 18:14;  Start 3/29/20 at 

20:00;  Stop 4/2/20 at 13:08;  Status DC


Potassium Chloride 15 meq/ Bicarbonate Dialysis Soln w/ out KCl 5,007.5 ml  @ 

1,000 mls/ hr Q5H1M IV  Last administered on 4/1/20at 18:14;  Start 3/29/20 at 

20:00;  Stop 4/2/20 at 13:08;  Status DC


Potassium Chloride 15 meq/ Bicarbonate Dialysis Soln w/ out KCl 5,007.5 ml  @ 

1,000 mls/ hr Q5H1M IV  Last administered on 4/1/20at 18:14;  Start 3/29/20 at 

20:00;  Stop 4/2/20 at 13:08;  Status DC


Iohexol (Omnipaque 240 Mg/ml) 30 ml 1X  ONCE PO  Last administered on 3/30/20at 

11:30;  Start 3/30/20 at 11:30;  Stop 3/30/20 at 11:33;  Status DC


Info (CONTRAST GIVEN -- Rx MONITORING) 1 each PRN DAILY  PRN MC SEE COMMENTS;  

Start 3/30/20 at 11:45;  Stop 4/1/20 at 11:44;  Status DC


Sodium Chloride 90 meq/Potassium Chloride 15 meq/ Potassium Phosphate 18 mmol/ 

Magnesium Sulfate 8 meq/Calcium Gluconate 15 meq/ Multivitamins 10 ml/Chromium/ 

Copper/Manganese/ Seleni/Zn 0.5 ml/ Insulin Human Regular 15 unit/ Total 

Parenteral Nutrition/Amino Acids/Dextrose/ Fat Emulsion Intravenous 1,400 ml @  

58.333 mls/ hr TPN  CONT IV  Last administered on 3/30/20at 21:47;  Start 

3/30/20 at 22:00;  Stop 3/31/20 at 21:59;  Status DC


Sodium Chloride 90 meq/Potassium Chloride 15 meq/ Potassium Phosphate 18 mmol/ 

Magnesium Sulfate 8 meq/Calcium Gluconate 15 meq/ Multivitamins 10 ml/Chromium/ 

Copper/Manganese/ Seleni/Zn 0.5 ml/ Insulin Human Regular 20 unit/ Total 

Parenteral Nutrition/Amino Acids/Dextrose/ Fat Emulsion Intravenous 1,400 ml @  

58.333 mls/ hr TPN  CONT IV  Last administered on 3/31/20at 21:36;  Start 3

/31/20 at 22:00;  Stop 4/1/20 at 21:59;  Status DC


Alteplase, Recombinant (Cathflo For Central Catheter Clearance) 1 mg 1X  ONCE 

INT CAT  Last administered on 3/31/20at 20:03;  Start 3/31/20 at 19:30;  Stop 

3/31/20 at 19:46;  Status DC


Alteplase, Recombinant (Cathflo For Central Catheter Clearance) 1 mg 1X  ONCE 

INT CAT  Last administered on 3/31/20at 22:05;  Start 3/31/20 at 22:00;  Stop 

3/31/20 at 22:01;  Status DC


Sodium Chloride 90 meq/Potassium Chloride 15 meq/ Potassium Phosphate 18 mmol/ 

Magnesium Sulfate 8 meq/Calcium Gluconate 15 meq/ Multivitamins 10 ml/Chromium/ 

Copper/Manganese/ Seleni/Zn 0.5 ml/ Insulin Human Regular 20 unit/ Total 

Parenteral Nutrition/Amino Acids/Dextrose/ Fat Emulsion Intravenous 1,400 ml @  

58.333 mls/ hr TPN  CONT IV  Last administered on 4/1/20at 21:30;  Start 4/1/20 

at 22:00;  Stop 4/2/20 at 21:59;  Status DC


Dexmedetomidine HCl 400 mcg/ Sodium Chloride 100 ml @ 0 mls/hr CONT  PRN IV 

ANXIETY / AGITATION Last administered on 5/30/20at 12:57;  Start 4/2/20 at 

08:15;  Stop 5/30/20 at 18:31;  Status DC


Sodium Chloride 500 ml @  500 mls/hr 1X PRN  PRN IV ELEVATED BP, SEE COMMENTS;  

Start 4/2/20 at 08:15


Atropine Sulfate (ATROPINE 0.5mg SYRINGE) 0.5 mg PRN Q5MIN  PRN IV SEE COMMENTS;

 Start 4/2/20 at 08:15


Furosemide (Lasix) 20 mg 1X  ONCE IVP  Last administered on 4/2/20at 08:19;  

Start 4/2/20 at 08:15;  Stop 4/2/20 at 08:16;  Status DC


Lidocaine HCl (Buffered Lidocaine 1%) 3 ml STK-MED ONCE .ROUTE ;  Start 4/2/20 

at 08:39;  Stop 4/2/20 at 08:39;  Status DC


Lidocaine HCl (Buffered Lidocaine 1%) 6 ml 1X  ONCE INJ  Last administered on 

4/2/20at 09:05;  Start 4/2/20 at 09:00;  Stop 4/2/20 at 09:06;  Status DC


Sodium Chloride 90 meq/Potassium Chloride 15 meq/ Potassium Phosphate 18 mmol/ 

Magnesium Sulfate 8 meq/Calcium Gluconate 15 meq/ Multivitamins 10 ml/Chromium/ 

Copper/Manganese/ Seleni/Zn 0.5 ml/ Insulin Human Regular 20 unit/ Total 

Parenteral Nutrition/Amino Acids/Dextrose/ Fat Emulsion Intravenous 1,400 ml @  

58.333 mls/ hr TPN  CONT IV  Last administered on 4/2/20at 22:45;  Start 4/2/20 

at 22:00;  Stop 4/3/20 at 21:59;  Status DC


Sodium Chloride 1,000 ml @  1,000 mls/hr Q1H PRN IV hypotension;  Start 4/3/20 

at 07:30;  Stop 4/3/20 at 13:29;  Status DC


Albumin Human 200 ml @  200 mls/hr 1X PRN  PRN IV Hypotension Last administered 

on 4/3/20at 09:36;  Start 4/3/20 at 07:30;  Stop 4/3/20 at 13:29;  Status DC


Sodium Chloride (Normal Saline Flush) 10 ml 1X PRN  PRN IV AP catheter pack;  

Start 4/3/20 at 07:30;  Stop 4/3/20 at 21:29;  Status DC


Sodium Chloride (Normal Saline Flush) 10 ml 1X PRN  PRN IV  catheter pack;  

Start 4/3/20 at 07:30;  Stop 4/4/20 at 07:29;  Status DC


Sodium Chloride 1,000 ml @  400 mls/hr Q2H30M PRN IV PATENCY;  Start 4/3/20 at 

07:30;  Stop 4/3/20 at 19:29;  Status DC


Info (PHARMACY MONITORING -- do not chart) 1 each PRN DAILY  PRN MC SEE 

COMMENTS;  Start 4/3/20 at 07:30;  Stop 4/3/20 at 13:02;  Status DC


Info (PHARMACY MONITORING -- do not chart) 1 each PRN DAILY  PRN MC SEE 

COMMENTS;  Start 4/3/20 at 07:30;  Stop 4/5/20 at 12:45;  Status DC


Sodium Chloride 90 meq/Potassium Chloride 15 meq/ Potassium Phosphate 10 mmol/ 

Magnesium Sulfate 8 meq/Calcium Gluconate 15 meq/ Multivitamins 10 ml/Chromium/ 

Copper/Manganese/ Seleni/Zn 0.5 ml/ Insulin Human Regular 25 unit/ Total 

Parenteral Nutrition/Amino Acids/Dextrose/ Fat Emulsion Intravenous 1,400 ml @  

58.333 mls/ hr TPN  CONT IV  Last administered on 4/3/20at 22:19;  Start 4/3/20 

at 22:00;  Stop 4/4/20 at 21:59;  Status DC


Heparin Sodium (Porcine) (Heparin Sodium) 5,000 unit Q12HR SQ  Last administered

on 4/26/20at 08:59;  Start 4/3/20 at 21:00;  Stop 4/26/20 at 10:05;  Status DC


Ondansetron HCl (Zofran) 4 mg PRN Q6HRS  PRN IV NAUSEA/VOMITING;  Start 4/6/20 

at 07:00;  Stop 4/7/20 at 06:59;  Status DC


Fentanyl Citrate (Fentanyl 2ml Vial) 25 mcg PRN Q5MIN  PRN IV MILD PAIN 1-3;  

Start 4/6/20 at 07:00;  Stop 4/7/20 at 06:59;  Status DC


Fentanyl Citrate (Fentanyl 2ml Vial) 50 mcg PRN Q5MIN  PRN IV MODERATE TO SEVERE

PAIN;  Start 4/6/20 at 07:00;  Stop 4/7/20 at 06:59;  Status DC


Ringer's Solution 1,000 ml @  30 mls/hr Q24H IV ;  Start 4/6/20 at 07:00;  Stop 

4/6/20 at 18:59;  Status DC


Lidocaine HCl (Xylocaine-Mpf 1% 2ml Vial) 2 ml PRN 1X  PRN ID PRIOR TO IV START;

 Start 4/6/20 at 07:00;  Stop 4/7/20 at 06:59;  Status DC


Prochlorperazine Edisylate (Compazine) 5 mg PACU PRN  PRN IV NAUSEA, MRX1;  

Start 4/6/20 at 07:00;  Stop 4/7/20 at 06:59;  Status DC


Sodium Chloride 1,000 ml @  1,000 mls/hr Q1H PRN IV hypotension;  Start 4/4/20 

at 09:10;  Stop 4/4/20 at 15:09;  Status DC


Albumin Human 200 ml @  200 mls/hr 1X PRN  PRN IV Hypotension Last administered 

on 4/4/20at 10:10;  Start 4/4/20 at 09:15;  Stop 4/4/20 at 15:14;  Status DC


Sodium Chloride 1,000 ml @  400 mls/hr Q2H30M PRN IV PATENCY;  Start 4/4/20 at 

09:10;  Stop 4/4/20 at 21:09;  Status DC


Info (PHARMACY MONITORING -- do not chart) 1 each PRN DAILY  PRN MC SEE 

COMMENTS;  Start 4/4/20 at 09:15;  Stop 4/5/20 at 12:45;  Status DC


Info (PHARMACY MONITORING -- do not chart) 1 each PRN DAILY  PRN MC SEE 

COMMENTS;  Start 4/4/20 at 09:15;  Stop 4/5/20 at 12:45;  Status DC


Sodium Chloride 90 meq/Potassium Chloride 15 meq/ Potassium Phosphate 10 mmol/ 

Magnesium Sulfate 8 meq/Calcium Gluconate 15 meq/ Multivitamins 10 ml/Chromium/ 

Copper/Manganese/ Seleni/Zn 0.5 ml/ Insulin Human Regular 25 unit/ Total 

Parenteral Nutrition/Amino Acids/Dextrose/ Fat Emulsion Intravenous 1,400 ml @  

58.333 mls/ hr TPN  CONT IV  Last administered on 4/4/20at 22:10;  Start 4/4/20 

at 22:00;  Stop 4/5/20 at 21:59;  Status DC


Magnesium Sulfate 50 ml @ 25 mls/hr PRN DAILY  PRN IV for Mag < 1.7 on am labs 

Last administered on 4/20/20at 17:27;  Start 4/5/20 at 09:15


Sodium Chloride 90 meq/Potassium Chloride 15 meq/ Potassium Phosphate 10 mmol/ 

Magnesium Sulfate 8 meq/Calcium Gluconate 15 meq/ Multivitamins 10 ml/Chromium/ 

Copper/Manganese/ Seleni/Zn 0.5 ml/ Insulin Human Regular 25 unit/ Total 

Parenteral Nutrition/Amino Acids/Dextrose/ Fat Emulsion Intravenous 1,400 ml @  

58.333 mls/ hr TPN  CONT IV  Last administered on 4/5/20at 21:20;  Start 4/5/20 

at 22:00;  Stop 4/6/20 at 21:59;  Status DC


Sodium Chloride 1,000 ml @  1,000 mls/hr Q1H PRN IV hypotension;  Start 4/5/20 

at 12:23;  Stop 4/5/20 at 18:22;  Status DC


Albumin Human 200 ml @  200 mls/hr 1X  ONCE IV  Last administered on 4/5/20at 

13:34;  Start 4/5/20 at 12:30;  Stop 4/5/20 at 13:29;  Status DC


Diphenhydramine HCl (Benadryl) 25 mg 1X PRN  PRN IV ITCHING;  Start 4/5/20 at 

12:30;  Stop 4/6/20 at 12:29;  Status DC


Diphenhydramine HCl (Benadryl) 25 mg 1X PRN  PRN IV ITCHING;  Start 4/5/20 at 

12:30;  Stop 4/6/20 at 12:29;  Status DC


Info (PHARMACY MONITORING -- do not chart) 1 each PRN DAILY  PRN MC SEE 

COMMENTS;  Start 4/5/20 at 12:30;  Status Cancel


Bupivacaine HCl/ Epinephrine Bitart (Sensorcain-Epi 0.5%-1:380250 Mpf) 30 ml 

STK-MED ONCE .ROUTE  Last administered on 4/6/20at 11:44;  Start 4/6/20 at 

11:00;  Stop 4/6/20 at 11:01;  Status DC


Cellulose (Surgicel Fibrillar 1x2) 1 each STK-MED ONCE .ROUTE ;  Start 4/6/20 at

11:00;  Stop 4/6/20 at 11:01;  Status DC


Sodium Chloride 90 meq/Potassium Chloride 15 meq/ Potassium Phosphate 10 mmol/ 

Magnesium Sulfate 12 meq/Calcium Gluconate 15 meq/ Multivitamins 10 ml/Chromium/

Copper/Manganese/ Seleni/Zn 0.5 ml/ Insulin Human Regular 25 unit/ Total P

arenteral Nutrition/Amino Acids/Dextrose/ Fat Emulsion Intravenous 1,400 ml @  

58.333 mls/ hr TPN  CONT IV  Last administered on 4/6/20at 22:24;  Start 4/6/20 

at 22:00;  Stop 4/7/20 at 21:59;  Status DC


Propofol 20 ml @ As Directed STK-MED ONCE IV ;  Start 4/6/20 at 11:07;  Stop 

4/6/20 at 11:07;  Status DC


Cellulose (Surgicel Hemostat 4x8) 1 each STK-MED ONCE .ROUTE  Last administered 

on 4/6/20at 11:44;  Start 4/6/20 at 11:55;  Stop 4/6/20 at 11:56;  Status DC


Sevoflurane (Ultane) 60 ml STK-MED ONCE IH ;  Start 4/6/20 at 12:46;  Stop 

4/6/20 at 12:46;  Status DC


Sodium Chloride 1,000 ml @  1,000 mls/hr Q1H PRN IV hypotension;  Start 4/6/20 

at 13:51;  Stop 4/6/20 at 19:50;  Status DC


Albumin Human 200 ml @  200 mls/hr 1X PRN  PRN IV Hypotension Last administered 

on 4/6/20at 14:51;  Start 4/6/20 at 14:00;  Stop 4/6/20 at 19:59;  Status DC


Diphenhydramine HCl (Benadryl) 25 mg 1X PRN  PRN IV ITCHING;  Start 4/6/20 at 

14:00;  Stop 4/7/20 at 13:59;  Status DC


Diphenhydramine HCl (Benadryl) 25 mg 1X PRN  PRN IV ITCHING;  Start 4/6/20 at 

14:00;  Stop 4/7/20 at 13:59;  Status DC


Sodium Chloride 1,000 ml @  400 mls/hr Q2H30M PRN IV PATENCY;  Start 4/6/20 at 

13:51;  Stop 4/7/20 at 01:50;  Status DC


Info (PHARMACY MONITORING -- do not chart) 1 each PRN DAILY  PRN MC SEE CO

MMENTS;  Start 4/6/20 at 14:00;  Stop 4/9/20 at 08:16;  Status DC


Heparin Sodium (Porcine) (Hep Lock Adult) 500 unit STK-MED ONCE IVP ;  Start 

4/7/20 at 09:29;  Stop 4/7/20 at 09:30;  Status DC


Sodium Chloride 1,000 ml @  1,000 mls/hr Q1H PRN IV hypotension;  Start 4/7/20 

at 10:43;  Stop 4/7/20 at 16:42;  Status DC


Sodium Chloride 1,000 ml @  400 mls/hr Q2H30M PRN IV PATENCY;  Start 4/7/20 at 

10:43;  Stop 4/7/20 at 22:42;  Status DC


Info (PHARMACY MONITORING -- do not chart) 1 each PRN DAILY  PRN MC SEE COM

MENTS;  Start 4/7/20 at 10:45;  Status UNV


Info (PHARMACY MONITORING -- do not chart) 1 each PRN DAILY  PRN MC SEE 

COMMENTS;  Start 4/7/20 at 10:45;  Status UNV


Sodium Chloride 90 meq/Potassium Chloride 15 meq/ Magnesium Sulfate 12 

meq/Calcium Gluconate 15 meq/ Multivitamins 10 ml/Chromium/ Copper/Manganese/ 

Seleni/Zn 0.5 ml/ Insulin Human Regular 25 unit/ Total Parenteral Nutritio

n/Amino Acids/Dextrose/ Fat Emulsion Intravenous 1,400 ml @  58.333 mls/ hr TPN 

CONT IV  Last administered on 4/7/20at 22:13;  Start 4/7/20 at 22:00;  Stop 

4/8/20 at 21:59;  Status DC


Sodium Chloride 1,000 ml @  1,000 mls/hr Q1H PRN IV hypotension;  Start 4/8/20 

at 07:50;  Stop 4/8/20 at 13:49;  Status DC


Albumin Human 200 ml @  200 mls/hr 1X  ONCE IV ;  Start 4/8/20 at 08:00;  Stop 

4/8/20 at 08:53;  Status DC


Diphenhydramine HCl (Benadryl) 25 mg 1X PRN  PRN IV ITCHING;  Start 4/8/20 at 

08:00;  Stop 4/9/20 at 07:59;  Status DC


Diphenhydramine HCl (Benadryl) 25 mg 1X PRN  PRN IV ITCHING;  Start 4/8/20 at 

08:00;  Stop 4/9/20 at 07:59;  Status DC


Info (PHARMACY MONITORING -- do not chart) 1 each PRN DAILY  PRN MC SEE 

COMMENTS;  Start 4/8/20 at 08:00;  Stop 4/9/20 at 08:16;  Status DC


Albumin Human 50 ml @ 50 mls/hr 1X  ONCE IV ;  Start 4/8/20 at 08:53;  Stop 

4/8/20 at 08:56;  Status DC


Albumin Human 200 ml @  50 mls/hr PRN 1X  PRN IV HYPOTENSION Last administered 

on 4/14/20at 11:54;  Start 4/8/20 at 09:00;  Stop 5/21/20 at 11:14;  Status DC


Meropenem 500 mg/ Sodium Chloride 50 ml @  100 mls/hr Q12H IV  Last administered

on 4/28/20at 10:45;  Start 4/8/20 at 10:00;  Stop 4/28/20 at 12:37;  Status DC


Sodium Chloride 90 meq/Magnesium Sulfate 12 meq/ Calcium Gluconate 15 meq/ 

Multivitamins 10 ml/Chromium/ Copper/Manganese/ Seleni/Zn 0.5 ml/ Insulin Human 

Regular 25 unit/ Total Parenteral Nutrition/Amino Acids/Dextrose/ Fat Emulsion 

Intravenous 1,400 ml @  58.333 mls/ hr TPN  CONT IV  Last administered on 

4/8/20at 21:41;  Start 4/8/20 at 22:00;  Stop 4/9/20 at 21:59;  Status DC


Sodium Chloride 1,000 ml @  1,000 mls/hr Q1H PRN IV hypotension;  Start 4/9/20 

at 07:58;  Stop 4/9/20 at 13:57;  Status DC


Albumin Human 200 ml @  200 mls/hr 1X PRN  PRN IV Hypotension Last administered 

on 4/9/20at 09:30;  Start 4/9/20 at 08:00;  Stop 4/9/20 at 13:59;  Status DC


Sodium Chloride 1,000 ml @  400 mls/hr Q2H30M PRN IV PATENCY;  Start 4/9/20 at 

07:58;  Stop 4/9/20 at 19:57;  Status DC


Info (PHARMACY MONITORING -- do not chart) 1 each PRN DAILY  PRN MC SEE 

COMMENTS;  Start 4/9/20 at 08:00;  Status Cancel


Info (PHARMACY MONITORING -- do not chart) 1 each PRN DAILY  PRN MC SEE 

COMMENTS;  Start 4/9/20 at 08:15;  Status UNV


Sodium Chloride 90 meq/Potassium Phosphate 5 mmol/ Magnesium Sulfate 12 

meq/Calcium Gluconate 15 meq/ Multivitamins 10 ml/Chromium/ Copper/Manganese/ 

Seleni/Zn 0.5 ml/ Insulin Human Regular 30 unit/ Total Parenteral 

Nutrition/Amino Acids/Dextrose/ Fat Emulsion Intravenous 1,400 ml @  58.333 mls/

hr TPN  CONT IV  Last administered on 4/9/20at 22:08;  Start 4/9/20 at 22:00;  

Stop 4/10/20 at 21:59;  Status DC


Linezolid/Dextrose 300 ml @  300 mls/hr Q12HR IV  Last administered on 4/20/20at

20:40;  Start 4/10/20 at 11:00;  Stop 4/21/20 at 08:10;  Status DC


Sodium Chloride 90 meq/Potassium Phosphate 15 mmol/ Magnesium Sulfate 12 

meq/Calcium Gluconate 15 meq/ Multivitamins 10 ml/Chromium/ Copper/Manganese/ 

Seleni/Zn 0.5 ml/ Insulin Human Regular 30 unit/ Total Parenteral 

Nutrition/Amino Acids/Dextrose/ Fat Emulsion Intravenous 1,400 ml @  58.333 mls/

hr TPN  CONT IV  Last administered on 4/10/20at 21:49;  Start 4/10/20 at 22:00; 

Stop 4/11/20 at 21:59;  Status DC


Sodium Chloride 90 meq/Potassium Phosphate 15 mmol/ Magnesium Sulfate 12 

meq/Calcium Gluconate 15 meq/ Multivitamins 10 ml/Chromium/ Copper/Manganese/ 

Seleni/Zn 0.5 ml/ Insulin Human Regular 40 unit/ Total Parenteral 

Nutrition/Amino Acids/Dextrose/ Fat Emulsion Intravenous 1,400 ml @  58.333 mls/

hr TPN  CONT IV  Last administered on 4/11/20at 21:21;  Start 4/11/20 at 22:00; 

Stop 4/12/20 at 21:59;  Status DC


Sodium Chloride 1,000 ml @  1,000 mls/hr Q1H PRN IV hypotension;  Start 4/11/20 

at 13:26;  Stop 4/11/20 at 19:25;  Status DC


Albumin Human 200 ml @  200 mls/hr 1X PRN  PRN IV Hypotension Last administered 

on 4/11/20at 15:00;  Start 4/11/20 at 13:30;  Stop 4/11/20 at 19:29;  Status DC


Sodium Chloride (Normal Saline Flush) 10 ml 1X PRN  PRN IV AP catheter pack;  

Start 4/11/20 at 13:30;  Stop 4/12/20 at 13:29;  Status DC


Sodium Chloride (Normal Saline Flush) 10 ml 1X PRN  PRN IV  catheter pack;  

Start 4/11/20 at 13:30;  Stop 4/12/20 at 13:29;  Status DC


Sodium Chloride 1,000 ml @  400 mls/hr Q2H30M PRN IV PATENCY;  Start 4/11/20 at 

13:26;  Stop 4/12/20 at 01:25;  Status DC


Info (PHARMACY MONITORING -- do not chart) 1 each PRN DAILY  PRN MC SEE 

COMMENTS;  Start 4/11/20 at 13:30;  Stop 4/11/20 at 13:33;  Status DC


Info (PHARMACY MONITORING -- do not chart) 1 each PRN DAILY  PRN MC SEE 

COMMENTS;  Start 4/11/20 at 13:30;  Stop 4/11/20 at 13:34;  Status DC


Sodium Chloride 90 meq/Potassium Phosphate 19 mmol/ Magnesium Sulfate 12 

meq/Calcium Gluconate 15 meq/ Multivitamins 10 ml/Chromium/ Copper/Manganese/ 

Seleni/Zn 0.5 ml/ Insulin Human Regular 40 unit/ Total Parenteral 

Nutrition/Amino Acids/Dextrose/ Fat Emulsion Intravenous 1,400 ml @  58.333 mls/

hr TPN  CONT IV  Last administered on 4/12/20at 21:54;  Start 4/12/20 at 22:00; 

Stop 4/13/20 at 21:59;  Status DC


Sodium Chloride 1,000 ml @  1,000 mls/hr Q1H PRN IV hypotension;  Start 4/13/20 

at 09:35;  Stop 4/13/20 at 15:34;  Status DC


Albumin Human 200 ml @  200 mls/hr 1X PRN  PRN IV Hypotension;  Start 4/13/20 at

09:45;  Stop 4/13/20 at 15:44;  Status DC


Diphenhydramine HCl (Benadryl) 25 mg 1X PRN  PRN IV ITCHING;  Start 4/13/20 at 

09:45;  Stop 4/14/20 at 09:44;  Status DC


Diphenhydramine HCl (Benadryl) 25 mg 1X PRN  PRN IV ITCHING;  Start 4/13/20 at 

09:45;  Stop 4/14/20 at 09:44;  Status DC


Sodium Chloride 1,000 ml @  400 mls/hr Q2H30M PRN IV PATENCY;  Start 4/13/20 at 

09:35;  Stop 4/13/20 at 21:34;  Status DC


Info (PHARMACY MONITORING -- do not chart) 1 each PRN DAILY  PRN MC SEE 

COMMENTS;  Start 4/13/20 at 09:45;  Status Cancel


Sodium Chloride 100 meq/Potassium Phosphate 19 mmol/ Magnesium Sulfate 12 

meq/Calcium Gluconate 15 meq/ Multivitamins 10 ml/Chromium/ Copper/Manganese/ 

Seleni/Zn 0.5 ml/ Insulin Human Regular 40 unit/ Potassium Chloride 20 meq/ 

Total Parenteral Nutrition/Amino Acids/Dextrose/ Fat Emulsion Intravenous 1,400 

ml @  58.333 mls/ hr TPN  CONT IV  Last administered on 4/13/20at 22:02;  Start 

4/13/20 at 22:00;  Stop 4/14/20 at 21:59;  Status DC


Furosemide (Lasix) 40 mg 1X  ONCE IVP  Last administered on 4/13/20at 14:39;  

Start 4/13/20 at 14:30;  Stop 4/13/20 at 14:31;  Status DC


Metronidazole 100 ml @  100 mls/hr Q8HRS IV  Last administered on 4/21/20at 

06:04;  Start 4/14/20 at 10:00;  Stop 4/21/20 at 08:10;  Status DC


Sodium Chloride 1,000 ml @  1,000 mls/hr Q1H PRN IV hypotension;  Start 4/14/20 

at 08:00;  Stop 4/14/20 at 13:59;  Status DC


Albumin Human 200 ml @  200 mls/hr 1X PRN  PRN IV Hypotension;  Start 4/14/20 at

08:00;  Stop 4/14/20 at 13:59;  Status DC


Sodium Chloride 1,000 ml @  400 mls/hr Q2H30M PRN IV PATENCY;  Start 4/14/20 at 

08:00;  Stop 4/14/20 at 19:59;  Status DC


Info (PHARMACY MONITORING -- do not chart) 1 each PRN DAILY  PRN MC SEE 

COMMENTS;  Start 4/14/20 at 11:30;  Status UNV


Info (PHARMACY MONITORING -- do not chart) 1 each PRN DAILY  PRN MC SEE 

COMMENTS;  Start 4/14/20 at 11:30;  Stop 4/16/20 at 12:13;  Status DC


Sodium Chloride 100 meq/Potassium Phosphate 19 mmol/ Magnesium Sulfate 12 

meq/Calcium Gluconate 15 meq/ Multivitamins 10 ml/Chromium/ Copper/Manganese/ 

Seleni/Zn 0.5 ml/ Insulin Human Regular 40 unit/ Potassium Chloride 20 meq/ 

Total Parenteral Nutrition/Amino Acids/Dextrose/ Fat Emulsion Intravenous 1,400 

ml @  58.333 mls/ hr TPN  CONT IV  Last administered on 4/14/20at 21:52;  Start 

4/14/20 at 22:00;  Stop 4/15/20 at 21:59;  Status DC


Sodium Chloride (Normal Saline Flush) 10 ml QSHIFT  PRN IV AFTER MEDS AND BLOOD 

DRAWS;  Start 4/14/20 at 15:00;  Stop 5/12/20 at 11:27;  Status DC


Sodium Chloride (Normal Saline Flush) 10 ml PRN Q5MIN  PRN IV AFTER MEDS AND 

BLOOD DRAWS;  Start 4/14/20 at 15:00


Sodium Chloride (Normal Saline Flush) 20 ml PRN Q5MIN  PRN IV AFTER MEDS AND 

BLOOD DRAWS;  Start 4/14/20 at 15:00


Sodium Chloride 100 meq/Potassium Phosphate 19 mmol/ Magnesium Sulfate 12 

meq/Calcium Gluconate 15 meq/ Multivitamins 10 ml/Chromium/ Copper/Manganese/ 

Seleni/Zn 0.5 ml/ Insulin Human Regular 40 unit/ Potassium Chloride 20 meq/ 

Total Parenteral Nutrition/Amino Acids/Dextrose/ Fat Emulsion Intravenous 1,400 

ml @  58.333 mls/ hr TPN  CONT IV  Last administered on 4/15/20at 21:20;  Start 

4/15/20 at 22:00;  Stop 4/16/20 at 21:59;  Status DC


Lidocaine HCl (Buffered Lidocaine 1%) 3 ml STK-MED ONCE .ROUTE ;  Start 4/15/20 

at 13:16;  Stop 4/15/20 at 13:16;  Status DC


Lidocaine HCl (Buffered Lidocaine 1%) 6 ml 1X  ONCE INJ  Last administered on 

4/15/20at 13:45;  Start 4/15/20 at 13:30;  Stop 4/15/20 at 13:31;  Status DC


Albumin Human 100 ml @  100 mls/hr 1X  ONCE IV  Last administered on 4/15/20at 

15:41;  Start 4/15/20 at 15:00;  Stop 4/15/20 at 15:59;  Status DC


Albumin Human 50 ml @ 50 mls/hr 1X  ONCE IV  Last administered on 4/15/20at 

15:00;  Start 4/15/20 at 15:00;  Stop 4/15/20 at 15:59;  Status DC


Info (PHARMACY MONITORING -- do not chart) 1 each PRN DAILY  PRN MC SEE 

COMMENTS;  Start 4/16/20 at 11:30;  Status Cancel


Info (PHARMACY MONITORING -- do not chart) 1 each PRN DAILY  PRN MC SEE 

COMMENTS;  Start 4/16/20 at 11:30;  Status UNV


Sodium Chloride 100 meq/Potassium Phosphate 10 mmol/ Magnesium Sulfate 12 

meq/Calcium Gluconate 15 meq/ Multivitamins 10 ml/Chromium/ Copper/Manganese/ 

Seleni/Zn 0.5 ml/ Insulin Human Regular 35 unit/ Potassium Chloride 20 meq/ 

Total Parenteral Nutrition/Amino Acids/Dextrose/ Fat Emulsion Intravenous 1,400 

ml @  58.333 mls/ hr TPN  CONT IV  Last administered on 4/16/20at 22:10;  Start 

4/16/20 at 22:00;  Stop 4/17/20 at 21:59;  Status DC


Sodium Chloride 100 meq/Potassium Phosphate 5 mmol/ Magnesium Sulfate 12 

meq/Calcium Gluconate 15 meq/ Multivitamins 10 ml/Chromium/ Copper/Manganese/ 

Seleni/Zn 0.5 ml/ Insulin Human Regular 35 unit/ Potassium Chloride 20 meq/ 

Total Parenteral Nutrition/Amino Acids/Dextrose/ Fat Emulsion Intravenous 1,400 

ml @  58.333 mls/ hr TPN  CONT IV  Last administered on 4/17/20at 22:59;  Start 

4/17/20 at 22:00;  Stop 4/18/20 at 21:59;  Status DC


Sodium Chloride 1,000 ml @  1,000 mls/hr Q1H PRN IV hypotension;  Start 4/18/20 

at 08:27;  Stop 4/18/20 at 14:26;  Status DC


Albumin Human 200 ml @  200 mls/hr 1X PRN  PRN IV Hypotension Last administered 

on 4/18/20at 09:18;  Start 4/18/20 at 08:30;  Stop 4/18/20 at 14:29;  Status DC


Sodium Chloride 1,000 ml @  400 mls/hr Q2H30M PRN IV PATENCY;  Start 4/18/20 at 

08:27;  Stop 4/18/20 at 20:26;  Status DC


Info (PHARMACY MONITORING -- do not chart) 1 each PRN DAILY  PRN MC SEE 

COMMENTS;  Start 4/18/20 at 08:30;  Status Cancel


Info (PHARMACY MONITORING -- do not chart) 1 each PRN DAILY  PRN MC SEE 

COMMENTS;  Start 4/18/20 at 08:30;  Stop 4/26/20 at 13:10;  Status DC


Sodium Chloride 100 meq/Potassium Chloride 40 meq/ Magnesium Sulfate 15 

meq/Calcium Gluconate 15 meq/ Multivitamins 10 ml/Chromium/ Copper/Manganese/ 

Seleni/Zn 0.5 ml/ Insulin Human Regular 35 unit/ Total Parenteral 

Nutrition/Amino Acids/Dextrose/ Fat Emulsion Intravenous 1,400 ml @  58.333 mls/

hr TPN  CONT IV  Last administered on 4/18/20at 22:00;  Start 4/18/20 at 22:00; 

Stop 4/19/20 at 21:59;  Status DC


Potassium Chloride/Water 100 ml @  100 mls/hr 1X  ONCE IV  Last administered on 

4/18/20at 17:28;  Start 4/18/20 at 14:45;  Stop 4/18/20 at 15:44;  Status DC


Sodium Chloride 100 meq/Potassium Chloride 40 meq/ Magnesium Sulfate 15 

meq/Calcium Gluconate 15 meq/ Multivitamins 10 ml/Chromium/ Copper/Manganese/ 

Seleni/Zn 0.5 ml/ Insulin Human Regular 35 unit/ Total Parenteral 

Nutrition/Amino Acids/Dextrose/ Fat Emulsion Intravenous 1,400 ml @  58.333 mls/

hr TPN  CONT IV  Last administered on 4/19/20at 22:46;  Start 4/19/20 at 22:00; 

Stop 4/20/20 at 21:59;  Status DC


Sodium Chloride 100 meq/Potassium Chloride 40 meq/ Magnesium Sulfate 20 

meq/Calcium Gluconate 15 meq/ Multivitamins 10 ml/Chromium/ Copper/Manganese/ 

Seleni/Zn 0.5 ml/ Insulin Human Regular 35 unit/ Total Parenteral Nutr

ition/Amino Acids/Dextrose/ Fat Emulsion Intravenous 1,400 ml @  58.333 mls/ hr 

TPN  CONT IV  Last administered on 4/20/20at 22:31;  Start 4/20/20 at 22:00;  

Stop 4/21/20 at 21:59;  Status DC


Fentanyl Citrate (Fentanyl 2ml Vial) 50 mcg PRN Q2HR  PRN IVP PAIN Last 

administered on 4/27/20at 13:32;  Start 4/20/20 at 21:00;  Stop 4/28/20 at 

12:53;  Status DC


Fentanyl Citrate (Fentanyl 2ml Vial) 25 mcg PRN Q2HR  PRN IVP PAIN;  Start 

4/20/20 at 21:00;  Stop 4/28/20 at 12:54;  Status DC


Enoxaparin Sodium (Lovenox 100mg Syringe) 100 mg Q12HR SQ ;  Start 4/21/20 at 

21:00;  Status UNV


Amino Acids/ Glycerin/ Electrolytes 1,000 ml @  75 mls/hr [id number] IV ;  Start 

4/20/20 at 21:15;  Status UNV


Sodium Chloride 1,000 ml @  1,000 mls/hr Q1H PRN IV hypotension;  Start 4/21/20 

at 07:56;  Stop 4/21/20 at 13:55;  Status DC


Albumin Human 200 ml @  200 mls/hr 1X PRN  PRN IV Hypotension Last administered 

on 4/21/20at 08:40;  Start 4/21/20 at 08:00;  Stop 4/21/20 at 13:59;  Status DC


Sodium Chloride 1,000 ml @  400 mls/hr Q2H30M PRN IV PATENCY;  Start 4/21/20 at 

07:56;  Stop 4/21/20 at 19:55;  Status DC


Info (PHARMACY MONITORING -- do not chart) 1 each PRN DAILY  PRN MC SEE 

COMMENTS;  Start 4/21/20 at 08:00;  Status UNV


Info (PHARMACY MONITORING -- do not chart) 1 each PRN DAILY  PRN MC SEE 

COMMENTS;  Start 4/21/20 at 08:00;  Status UNV


Daptomycin 430 mg/ Sodium Chloride 50 ml @  100 mls/hr Q24H IV  Last admi

nistered on 4/21/20at 12:35;  Start 4/21/20 at 09:00;  Stop 4/21/20 at 12:49;  

Status DC


Sodium Chloride 100 meq/Potassium Chloride 40 meq/ Magnesium Sulfate 20 

meq/Calcium Gluconate 15 meq/ Multivitamins 10 ml/Chromium/ Copper/Manganese/ 

Seleni/Zn 0.5 ml/ Insulin Human Regular 35 unit/ Total Parenteral 

Nutrition/Amino Acids/Dextrose/ Fat Emulsion Intravenous 1,400 ml @  58.333 mls/

hr TPN  CONT IV  Last administered on 4/21/20at 21:26;  Start 4/21/20 at 22:00; 

Stop 4/22/20 at 21:59;  Status DC


Daptomycin 430 mg/ Sodium Chloride 50 ml @  100 mls/hr Q48H IV ;  Start 4/23/20 

at 09:00;  Stop 4/22/20 at 11:55;  Status DC


Sodium Chloride 100 meq/Potassium Chloride 40 meq/ Magnesium Sulfate 20 

meq/Calcium Gluconate 15 meq/ Multivitamins 10 ml/Chromium/ Copper/Manganese/ 

Seleni/Zn 0.5 ml/ Insulin Human Regular 35 unit/ Total Parenteral 

Nutrition/Amino Acids/Dextrose/ Fat Emulsion Intravenous 1,400 ml @  58.333 mls/

hr TPN  CONT IV  Last administered on 4/22/20at 22:27;  Start 4/22/20 at 22:00; 

Stop 4/23/20 at 21:59;  Status DC


Daptomycin 430 mg/ Sodium Chloride 50 ml @  100 mls/hr Q24H IV  Last 

administered on 4/24/20at 15:07;  Start 4/22/20 at 13:00;  Stop 4/25/20 at 

13:15;  Status DC


Sodium Chloride 100 meq/Potassium Chloride 40 meq/ Magnesium Sulfate 20 meq/Calc

ium Gluconate 10 meq/ Multivitamins 10 ml/Chromium/ Copper/Manganese/ Seleni/Zn 

0.5 ml/ Insulin Human Regular 35 unit/ Total Parenteral Nutrition/Amino 

Acids/Dextrose/ Fat Emulsion Intravenous 1,400 ml @  58.333 mls/ hr TPN  CONT IV

 Last administered on 4/24/20at 00:06;  Start 4/23/20 at 22:00;  Stop 4/24/20 at

21:59;  Status DC


Alteplase, Recombinant (Cathflo For Central Catheter Clearance) 1 mg 1X  ONCE 

INT CAT  Last administered on 4/24/20at 11:44;  Start 4/24/20 at 10:45;  Stop 

4/24/20 at 10:46;  Status DC


Ondansetron HCl (Zofran) 4 mg PRN Q6HRS  PRN IV NAUSEA/VOMITING;  Start 4/27/20 

at 07:00;  Stop 4/28/20 at 06:59;  Status DC


Fentanyl Citrate (Fentanyl 2ml Vial) 25 mcg PRN Q5MIN  PRN IV MILD PAIN 1-3;  

Start 4/27/20 at 07:00;  Stop 4/28/20 at 06:59;  Status DC


Fentanyl Citrate (Fentanyl 2ml Vial) 50 mcg PRN Q5MIN  PRN IV MODERATE TO SEVERE

PAIN Last administered on 4/27/20at 10:17;  Start 4/27/20 at 07:00;  Stop 

4/28/20 at 06:59;  Status DC


Ringer's Solution 1,000 ml @  30 mls/hr Q24H IV ;  Start 4/27/20 at 07:00;  Stop

4/27/20 at 18:59;  Status DC


Lidocaine HCl (Xylocaine-Mpf 1% 2ml Vial) 2 ml PRN 1X  PRN ID PRIOR TO IV START;

 Start 4/27/20 at 07:00;  Stop 4/28/20 at 06:59;  Status DC


Prochlorperazine Edisylate (Compazine) 5 mg PACU PRN  PRN IV NAUSEA, MRX1;  

Start 4/27/20 at 07:00;  Stop 4/28/20 at 06:59;  Status DC


Sodium Acetate 50 meq/Potassium Acetate 55 meq/ Magnesium Sulfate 20 meq/Calcium

Gluconate 10 meq/ Multivitamins 10 ml/Chromium/ Copper/Manganese/ Seleni/Zn 0.5 

ml/ Insulin Human Regular 35 unit/ Total Parenteral Nutrition/Amino 

Acids/Dextrose/ Fat Emulsion Intravenous 1,400 ml @  58.333 mls/ hr TPN  CONT IV

;  Start 4/24/20 at 22:00;  Stop 4/24/20 at 14:15;  Status DC


Sodium Acetate 50 meq/Potassium Acetate 55 meq/ Magnesium Sulfate 20 meq/Calcium

Gluconate 10 meq/ Multivitamins 10 ml/Chromium/ Copper/Manganese/ Seleni/Zn 0.5 

ml/ Insulin Human Regular 35 unit/ Total Parenteral Nutrition/Amino 

Acids/Dextrose/ Fat Emulsion Intravenous 1,800 ml @  75 mls/hr TPN  CONT IV  

Last administered on 4/24/20at 22:38;  Start 4/24/20 at 22:00;  Stop 4/25/20 at 

21:59;  Status DC


Sodium Chloride 1,000 ml @  1,000 mls/hr Q1H PRN IV hypotension;  Start 4/24/20 

at 15:31;  Stop 4/24/20 at 21:30;  Status DC


Diphenhydramine HCl (Benadryl) 25 mg 1X PRN  PRN IV ITCHING;  Start 4/24/20 at 

15:45;  Stop 4/25/20 at 15:44;  Status DC


Diphenhydramine HCl (Benadryl) 25 mg 1X PRN  PRN IV ITCHING;  Start 4/24/20 at 

15:45;  Stop 4/25/20 at 15:44;  Status DC


Sodium Chloride 1,000 ml @  400 mls/hr Q2H30M PRN IV PATENCY;  Start 4/24/20 at 

15:31;  Stop 4/25/20 at 03:30;  Status DC


Info (PHARMACY MONITORING -- do not chart) 1 each PRN DAILY  PRN MC SEE CO

MMENTS;  Start 4/24/20 at 15:45;  Stop 5/26/20 at 14:14;  Status DC


Sodium Acetate 50 meq/Potassium Acetate 55 meq/ Magnesium Sulfate 20 meq/Calcium

Gluconate 10 meq/ Multivitamins 10 ml/Chromium/ Copper/Manganese/ Seleni/Zn 0.5 

ml/ Insulin Human Regular 35 unit/ Total Parenteral Nutrition/Amino 

Acids/Dextrose/ Fat Emulsion Intravenous 1,800 ml @  75 mls/hr TPN  CONT IV  

Last administered on 4/25/20at 22:03;  Start 4/25/20 at 22:00;  Stop 4/26/20 at 

21:59;  Status DC


Daptomycin 430 mg/ Sodium Chloride 50 ml @  100 mls/hr Q24H IV  Last 

administered on 4/30/20at 13:00;  Start 4/25/20 at 13:00;  Stop 4/30/20 at 

20:58;  Status DC


Heparin Sodium (Porcine) 1000 unit/Sodium Chloride 1,001 ml @  1,001 mls/hr 1X  

ONCE IRR ;  Start 4/27/20 at 06:00;  Stop 4/27/20 at 06:59;  Status DC


Potassium Acetate 55 meq/Magnesium Sulfate 20 meq/ Calcium Gluconate 10 meq/ 

Multivitamins 10 ml/Chromium/ Copper/Manganese/ Seleni/Zn 0.5 ml/ Insulin Human 

Regular 35 unit/ Total Parenteral Nutrition/Amino Acids/Dextrose/ Fat Emulsion 

Intravenous 1,920 ml @  80 mls/hr TPN  CONT IV  Last administered on 4/26/20at 

22:10;  Start 4/26/20 at 22:00;  Stop 4/27/20 at 21:59;  Status DC


Dexamethasone Sodium Phosphate (Decadron) 4 mg STK-MED ONCE .ROUTE ;  Start 

4/27/20 at 10:56;  Stop 4/27/20 at 10:57;  Status DC


Ondansetron HCl (Zofran) 4 mg STK-MED ONCE .ROUTE ;  Start 4/27/20 at 10:56;  

Stop 4/27/20 at 10:57;  Status DC


Rocuronium Bromide (Zemuron) 50 mg STK-MED ONCE .ROUTE ;  Start 4/27/20 at 

10:56;  Stop 4/27/20 at 10:57;  Status DC


Fentanyl Citrate (Fentanyl 2ml Vial) 100 mcg STK-MED ONCE .ROUTE ;  Start 

4/27/20 at 10:56;  Stop 4/27/20 at 10:57;  Status DC


Bupivacaine HCl/ Epinephrine Bitart (Sensorcain-Epi 0.5%-1:209373 Mpf) 30 ml 

STK-MED ONCE .ROUTE  Last administered on 4/27/20at 12:01;  Start 4/27/20 at 

10:58;  Stop 4/27/20 at 10:58;  Status DC


Cellulose (Surgicel Hemostat 2x14) 1 each STK-MED ONCE .ROUTE ;  Start 4/27/20 

at 10:58;  Stop 4/27/20 at 10:59;  Status DC


Iohexol (Omnipaque 300 Mg/ml) 50 ml STK-MED ONCE .ROUTE ;  Start 4/27/20 at 

10:58;  Stop 4/27/20 at 10:59;  Status DC


Cellulose (Surgicel Hemostat 4x8) 1 each STK-MED ONCE .ROUTE ;  Start 4/27/20 at

10:58;  Stop 4/27/20 at 10:59;  Status DC


Bisacodyl (Dulcolax Supp) 10 mg STK-MED ONCE .ROUTE ;  Start 4/27/20 at 10:59;  

Stop 4/27/20 at 10:59;  Status DC


Heparin Sodium (Porcine) 1000 unit/Sodium Chloride 1,001 ml @  1,001 mls/hr 1X  

ONCE IRR ;  Start 4/27/20 at 12:00;  Stop 4/27/20 at 12:59;  Status DC


Propofol 20 ml @ As Directed STK-MED ONCE IV ;  Start 4/27/20 at 11:05;  Stop 

4/27/20 at 11:05;  Status DC


Sevoflurane (Ultane) 90 ml STK-MED ONCE IH ;  Start 4/27/20 at 11:05;  Stop 

4/27/20 at 11:05;  Status DC


Sevoflurane (Ultane) 60 ml STK-MED ONCE IH ;  Start 4/27/20 at 12:26;  Stop 

4/27/20 at 12:27;  Status DC


Propofol 20 ml @ As Directed STK-MED ONCE IV ;  Start 4/27/20 at 12:26;  Stop 

4/27/20 at 12:27;  Status DC


Phenylephrine HCl (PHENYLEPHRINE in 0.9% NACL PF) 1 mg STK-MED ONCE IV ;  Start 

4/27/20 at 12:34;  Stop 4/27/20 at 12:34;  Status DC


Heparin Sodium (Porcine) (Heparin Sodium) 5,000 unit Q12HR SQ  Last administered

on 5/6/20at 20:57;  Start 4/27/20 at 21:00;  Stop 5/7/20 at 09:59;  Status DC


Sodium Chloride (Normal Saline Flush) 3 ml QSHIFT  PRN IV AFTER MEDS AND BLOOD 

DRAWS;  Start 4/27/20 at 13:45


Naloxone HCl (Narcan) 0.4 mg PRN Q2MIN  PRN IV SEE INSTRUCTIONS Last 

administered on 6/6/20at 15:15;  Start 4/27/20 at 13:45


Sodium Chloride 1,000 ml @  25 mls/hr Q24H IV  Last administered on 5/26/20at 

13:37;  Start 4/27/20 at 13:37;  Stop 5/29/20 at 13:09;  Status DC


Naloxone HCl (Narcan) 0.4 mg PRN Q2MIN  PRN IV SEE INSTRUCTIONS;  Start 4/27/20 

at 14:30;  Status UNV


Sodium Chloride 1,000 ml @  25 mls/hr Q24H IV ;  Start 4/27/20 at 14:30;  Status

UNV


Hydromorphone HCl 30 ml @ 0 mls/hr CONT PRN  PRN IV PER PROTOCOL Last administe

red on 5/2/20at 16:08;  Start 4/27/20 at 14:30;  Stop 5/4/20 at 08:55;  Status 

DC


Potassium Acetate 55 meq/Magnesium Sulfate 20 meq/ Calcium Gluconate 10 meq/ 

Multivitamins 10 ml/Chromium/ Copper/Manganese/ Seleni/Zn 0.5 ml/ Insulin Human 

Regular 35 unit/ Total Parenteral Nutrition/Amino Acids/Dextrose/ Fat Emulsion 

Intravenous 1,920 ml @  80 mls/hr TPN  CONT IV  Last administered on 4/27/20at 

22:01;  Start 4/27/20 at 22:00;  Stop 4/28/20 at 21:59;  Status DC


Bumetanide (Bumex) 2 mg [id number] IV  Last administered on 5/1/20at 13:50;  Start 

4/28/20 at 14:00;  Stop 5/2/20 at 14:10;  Status DC


Meropenem 1 gm/ Sodium Chloride 100 ml @  200 mls/hr Q8HRS IV  Last administered

on 5/22/20at 05:53;  Start 4/28/20 at 14:00;  Stop 5/22/20 at 09:31;  Status DC


Potassium Acetate 55 meq/Magnesium Sulfate 20 meq/ Calcium Gluconate 10 meq/ 

Multivitamins 10 ml/Chromium/ Copper/Manganese/ Seleni/Zn 0.5 ml/ Insulin Human 

Regular 35 unit/ Total Parenteral Nutrition/Amino Acids/Dextrose/ Fat Emulsion 

Intravenous 1,920 ml @  80 mls/hr TPN  CONT IV  Last administered on 4/28/20at 

22:02;  Start 4/28/20 at 22:00;  Stop 4/29/20 at 21:59;  Status DC


Hydromorphone HCl (Dilaudid Standard PCA) 12 mg STK-MED ONCE IV ;  Start 4/27/20

at 14:35;  Stop 4/28/20 at 13:53;  Status DC


Artificial Tears (Artificial Tears) 1 drop PRN Q15MIN  PRN OU DRY EYE Last 

administered on 5/29/20at 10:08;  Start 4/29/20 at 05:30


Hydromorphone HCl (Dilaudid Standard PCA) 12 mg STK-MED ONCE IV ;  Start 4/28/20

at 12:05;  Stop 4/29/20 at 09:15;  Status DC


Potassium Acetate 65 meq/Magnesium Sulfate 20 meq/ Calcium Gluconate 10 meq/ 

Multivitamins 10 ml/Chromium/ Copper/Manganese/ Seleni/Zn 0.5 ml/ Insulin Human 

Regular 30 unit/ Total Parenteral Nutrition/Amino Acids/Dextrose/ Fat Emulsion 

Intravenous 1,920 ml @  80 mls/hr TPN  CONT IV  Last administered on 4/29/20at 

22:22;  Start 4/29/20 at 22:00;  Stop 4/30/20 at 21:59;  Status DC


Cyclobenzaprine HCl (Flexeril) 10 mg PRN Q6HRS  PRN PO MUSCLE SPASMS;  Start 

4/30/20 at 10:45


Potassium Acetate 55 meq/Magnesium Sulfate 20 meq/ Calcium Gluconate 10 meq/ 

Multivitamins 10 ml/Chromium/ Copper/Manganese/ Seleni/Zn 0.5 ml/ Insulin Human 

Regular 30 unit/ Total Parenteral Nutrition/Amino Acids/Dextrose/ Fat Emulsion 

Intravenous 1,920 ml @  80 mls/hr TPN  CONT IV  Last administered on 5/1/20at 

01:00;  Start 4/30/20 at 22:00;  Stop 5/1/20 at 21:59;  Status DC


Magnesium Sulfate 50 ml @ 25 mls/hr 1X  ONCE IV  Last administered on 4/30/20at 

17:18;  Start 4/30/20 at 12:45;  Stop 4/30/20 at 14:44;  Status DC


Potassium Chloride/Water 100 ml @  100 mls/hr 1X  ONCE IV  Last administered on 

5/1/20at 11:27;  Start 5/1/20 at 12:00;  Stop 5/1/20 at 12:59;  Status DC


Hydromorphone HCl (Dilaudid Standard PCA) 12 mg STK-MED ONCE IV ;  Start 4/29/20

at 10:50;  Stop 5/1/20 at 11:02;  Status DC


Hydromorphone HCl (Dilaudid Standard PCA) 12 mg STK-MED ONCE IV ;  Start 4/30/20

at 13:47;  Stop 5/1/20 at 11:03;  Status DC


Potassium Acetate 30 meq/Magnesium Sulfate 20 meq/ Calcium Gluconate 10 meq/ 

Multivitamins 10 ml/Chromium/ Copper/Manganese/ Seleni/Zn 0.5 ml/ Insulin Human 

Regular 30 unit/ Potassium Chloride 30 meq/ Total Parenteral Nutrition/Amino 

Acids/Dextrose/ Fat Emulsion Intravenous 1,920 ml @  80 mls/hr TPN  CONT IV  

Last administered on 5/1/20at 22:34;  Start 5/1/20 at 22:00;  Stop 5/2/20 at 

21:59;  Status DC


Potassium Chloride/Water 100 ml @  100 mls/hr Q1H IV  Last administered on 

5/2/20at 13:05;  Start 5/2/20 at 07:00;  Stop 5/2/20 at 10:59;  Status DC


Magnesium Sulfate 50 ml @ 25 mls/hr 1X  ONCE IV  Last administered on 5/2/20at 

10:34;  Start 5/2/20 at 10:30;  Stop 5/2/20 at 12:29;  Status DC


Potassium Chloride 75 meq/ Magnesium Sulfate 20 meq/Calcium Gluconate 10 meq/ 

Multivitamins 10 ml/Chromium/ Copper/Manganese/ Seleni/Zn 0.5 ml/ Insulin Human 

Regular 30 unit/ Total Parenteral Nutrition/Amino Acids/Dextrose/ Fat Emulsion 

Intravenous 1,920 ml @  80 mls/hr TPN  CONT IV  Last administered on 5/2/20at 

21:51;  Start 5/2/20 at 22:00;  Stop 5/3/20 at 22:00;  Status DC


Potassium Chloride 75 meq/ Magnesium Sulfate 20 meq/Calcium Gluconate 10 meq/ 

Multivitamins 10 ml/Chromium/ Copper/Manganese/ Seleni/Zn 0.5 ml/ Insulin Human 

Regular 25 unit/ Total Parenteral Nutrition/Amino Acids/Dextrose/ Fat Emulsion 

Intravenous 1,920 ml @  80 mls/hr TPN  CONT IV  Last administered on 5/3/20at 

22:04;  Start 5/3/20 at 22:00;  Stop 5/4/20 at 21:59;  Status DC


Hydromorphone HCl (Dilaudid) 0.4 mg PRN Q4HRS  PRN IVP PAIN Last administered on

5/4/20at 10:57;  Start 5/4/20 at 09:00;  Stop 5/4/20 at 18:59;  Status DC


Micafungin Sodium 100 mg/Dextrose 100 ml @  100 mls/hr Q24H IV  Last 

administered on 5/26/20at 12:17;  Start 5/4/20 at 11:00;  Stop 5/27/20 at 09:59;

 Status DC


Daptomycin 485 mg/ Sodium Chloride 50 ml @  100 mls/hr Q24H IV  Last 

administered on 5/11/20at 13:10;  Start 5/4/20 at 11:00;  Stop 5/12/20 at 07:44;

 Status DC


Potassium Chloride 75 meq/ Magnesium Sulfate 15 meq/Calcium Gluconate 8 meq/ 

Multivitamins 10 ml/Chromium/ Copper/Manganese/ Seleni/Zn 0.5 ml/ Insulin Human 

Regular 25 unit/ Total Parenteral Nutrition/Amino Acids/Dextrose/ Fat Emulsion 

Intravenous 1,920 ml @  80 mls/hr TPN  CONT IV  Last administered on 5/4/20at 

23:08;  Start 5/4/20 at 22:00;  Stop 5/5/20 at 21:59;  Status DC


Haloperidol Lactate (Haldol Inj) 3 mg 1X  ONCE IVP  Last administered on 

5/4/20at 14:37;  Start 5/4/20 at 14:30;  Stop 5/4/20 at 14:31;  Status DC


Hydromorphone HCl (Dilaudid) 1 mg PRN Q4HRS  PRN IVP PAIN Last administered on 

5/18/20at 06:25;  Start 5/4/20 at 19:00;  Stop 5/18/20 at 17:10;  Status DC


Potassium Chloride 75 meq/ Magnesium Sulfate 15 meq/Calcium Gluconate 8 meq/ 

Multivitamins 10 ml/Chromium/ Copper/Manganese/ Seleni/Zn 0.5 ml/ Insulin Human 

Regular 20 unit/ Total Parenteral Nutrition/Amino Acids/Dextrose/ Fat Emulsion 

Intravenous 1,920 ml @  80 mls/hr TPN  CONT IV  Last administered on 5/5/20at 

22:10;  Start 5/5/20 at 22:00;  Stop 5/6/20 at 21:59;  Status DC


Lidocaine HCl (Buffered Lidocaine 1%) 3 ml STK-MED ONCE .ROUTE ;  Start 5/6/20 

at 11:31;  Stop 5/6/20 at 11:31;  Status DC


Lidocaine HCl (Buffered Lidocaine 1%) 3 ml STK-MED ONCE .ROUTE ;  Start 5/6/20 

at 12:28;  Stop 5/6/20 at 12:29;  Status DC


Lidocaine HCl (Buffered Lidocaine 1%) 6 ml 1X  ONCE INJ  Last administered on 

5/6/20at 12:53;  Start 5/6/20 at 12:45;  Stop 5/6/20 at 12:46;  Status DC


Potassium Chloride 75 meq/ Magnesium Sulfate 15 meq/Calcium Gluconate 8 meq/ M

ultivitamins 10 ml/Chromium/ Copper/Manganese/ Seleni/Zn 0.5 ml/ Insulin Human 

Regular 20 unit/ Total Parenteral Nutrition/Amino Acids/Dextrose/ Fat Emulsion 

Intravenous 1,920 ml @  80 mls/hr TPN  CONT IV  Last administered on 5/6/20at 

22:00;  Start 5/6/20 at 22:00;  Stop 5/7/20 at 21:59;  Status DC


Potassium Chloride 75 meq/ Magnesium Sulfate 15 meq/Calcium Gluconate 8 meq/ 

Multivitamins 10 ml/Chromium/ Copper/Manganese/ Seleni/Zn 0.5 ml/ Insulin Human 

Regular 15 unit/ Total Parenteral Nutrition/Amino Acids/Dextrose/ Fat Emulsion 

Intravenous 1,920 ml @  80 mls/hr TPN  CONT IV  Last administered on 5/7/20at 

22:28;  Start 5/7/20 at 22:00;  Stop 5/8/20 at 21:59;  Status DC


Vecuronium Bromide (Norcuron Bolus) 6 mg PRN Q6HRS  PRN IV VENT ASYNCHRONY;  

Start 5/7/20 at 19:15;  Stop 5/7/20 at 19:35;  Status DC


Bumetanide (Bumex) 2 mg 1X  ONCE IV  Last administered on 5/7/20at 22:09;  Start

5/7/20 at 19:45;  Stop 5/7/20 at 19:46;  Status DC


Lidocaine HCl (Buffered Lidocaine 1%) 3 ml STK-MED ONCE .ROUTE ;  Start 5/8/20 

at 07:59;  Stop 5/8/20 at 07:59;  Status DC


Midazolam HCl (Versed) 5 mg STK-MED ONCE .ROUTE ;  Start 5/8/20 at 08:36;  Stop 

5/8/20 at 08:36;  Status DC


Fentanyl Citrate (Fentanyl 5ml Vial) 250 mcg STK-MED ONCE .ROUTE ;  Start 5/8/20

at 08:36;  Stop 5/8/20 at 08:37;  Status DC


Lidocaine HCl (Buffered Lidocaine 1%) 3 ml 1X  ONCE IJ  Last administered on 

5/8/20at 09:30;  Start 5/8/20 at 09:15;  Stop 5/8/20 at 09:16;  Status DC


Midazolam HCl (Versed) 5 mg 1X  ONCE IV  Last administered on 5/8/20at 09:30;  

Start 5/8/20 at 09:15;  Stop 5/8/20 at 09:16;  Status DC


Fentanyl Citrate (Fentanyl 5ml Vial) 250 mcg 1X  ONCE IV  Last administered on 

5/8/20at 09:30;  Start 5/8/20 at 09:15;  Stop 5/8/20 at 09:16;  Status DC


Bumetanide (Bumex) 2 mg DAILY IV  Last administered on 5/18/20at 08:07;  Start 

5/8/20 at 10:00;  Stop 5/18/20 at 17:15;  Status DC


Potassium Chloride 75 meq/ Magnesium Sulfate 15 meq/ Multivitamins 10 

ml/Chromium/ Copper/Manganese/ Seleni/Zn 0.5 ml/ Insulin Human Regular 15 unit/ 

Total Parenteral Nutrition/Amino Acids/Dextrose/ Fat Emulsion Intravenous 1,920 

ml @  80 mls/hr TPN  CONT IV  Last administered on 5/8/20at 21:59;  Start 5/8/20

at 22:00;  Stop 5/9/20 at 21:59;  Status DC


Metoclopramide HCl (Reglan Vial) 10 mg PRN Q3HRS  PRN IVP NAUSEA/VOMITING-3rd 

choice Last administered on 5/14/20at 04:25;  Start 5/9/20 at 16:45


Potassium Chloride 75 meq/ Magnesium Sulfate 15 meq/ Multivitamins 10 

ml/Chromium/ Copper/Manganese/ Seleni/Zn 0.5 ml/ Insulin Human Regular 15 unit/ 

Total Parenteral Nutrition/Amino Acids/Dextrose/ Fat Emulsion Intravenous 1,920 

ml @  80 mls/hr TPN  CONT IV  Last administered on 5/9/20at 22:41;  Start 5/9/20

at 22:00;  Stop 5/10/20 at 21:59;  Status DC


Magnesium Sulfate 50 ml @ 25 mls/hr 1X  ONCE IV  Last administered on 5/10/20at 

10:44;  Start 5/10/20 at 09:00;  Stop 5/10/20 at 10:59;  Status DC


Potassium Chloride/Water 100 ml @  100 mls/hr 1X  ONCE IV  Last administered on 

5/10/20at 09:37;  Start 5/10/20 at 09:00;  Stop 5/10/20 at 09:59;  Status DC


Duloxetine HCl (Cymbalta) 30 mg DAILY PO  Last administered on 5/11/20at 09:48; 

Start 5/10/20 at 14:00;  Stop 5/13/20 at 10:25;  Status DC


Potassium Chloride 80 meq/ Magnesium Sulfate 20 meq/ Multivitamins 10 

ml/Chromium/ Copper/Manganese/ Seleni/Zn 0.5 ml/ Insulin Human Regular 15 unit/ 

Total Parenteral Nutrition/Amino Acids/Dextrose/ Fat Emulsion Intravenous 1,920 

ml @  80 mls/hr TPN  CONT IV  Last administered on 5/10/20at 21:42;  Start 

5/10/20 at 22:00;  Stop 5/11/20 at 21:59;  Status DC


Potassium Chloride 80 meq/ Magnesium Sulfate 20 meq/ Multivitamins 10 

ml/Chromium/ Copper/Manganese/ Seleni/Zn 0.5 ml/ Insulin Human Regular 15 unit/ 

Total Parenteral Nutrition/Amino Acids/Dextrose/ Fat Emulsion Intravenous 1,920 

ml @  80 mls/hr TPN  CONT IV  Last administered on 5/11/20at 22:20;  Start 5/1 1/20 at 22:00;  Stop 5/12/20 at 21:59;  Status DC


Lidocaine HCl (Buffered Lidocaine 1%) 3 ml STK-MED ONCE .ROUTE ;  Start 5/12/20 

at 09:54;  Stop 5/12/20 at 09:55;  Status DC


Hydromorphone HCl (Dilaudid Standard PCA) 12 mg STK-MED ONCE IV ;  Start 5/1/20 

at 15:50;  Stop 5/12/20 at 11:24;  Status DC


Potassium Chloride 80 meq/ Magnesium Sulfate 20 meq/ Multivitamins 10 

ml/Chromium/ Copper/Manganese/ Seleni/Zn 0.5 ml/ Insulin Human Regular 15 unit/ 

Total Parenteral Nutrition/Amino Acids/Dextrose/ Fat Emulsion Intravenous 1,920 

ml @  80 mls/hr TPN  CONT IV  Last administered on 5/12/20at 21:40;  Start 

5/12/20 at 22:00;  Stop 5/13/20 at 21:59;  Status DC


Lidocaine HCl (Buffered Lidocaine 1%) 6 ml 1X  ONCE INJ  Last administered on 

5/12/20at 14:15;  Start 5/12/20 at 14:15;  Stop 5/12/20 at 14:16;  Status DC


Potassium Chloride 80 meq/ Magnesium Sulfate 20 meq/ Multivitamins 10 

ml/Chromium/ Copper/Manganese/ Seleni/Zn 1 ml/ Insulin Human Regular 15 unit/ 

Total Parenteral Nutrition/Amino Acids/Dextrose/ Fat Emulsion Intravenous 1,920 

ml @  80 mls/hr TPN  CONT IV  Last administered on 5/13/20at 22:04;  Start 

5/13/20 at 22:00;  Stop 5/14/20 at 21:59;  Status DC


Potassium Chloride/Water 100 ml @  100 mls/hr 1X  ONCE IV  Last administered on 

5/14/20at 11:34;  Start 5/14/20 at 11:00;  Stop 5/14/20 at 11:59;  Status DC


Potassium Chloride 90 meq/ Magnesium Sulfate 20 meq/ Multivitamins 10 

ml/Chromium/ Copper/Manganese/ Seleni/Zn 1 ml/ Insulin Human Regular 15 unit/ 

Total Parenteral Nutrition/Amino Acids/Dextrose/ Fat Emulsion Intravenous 1,920 

ml @  80 mls/hr TPN  CONT IV  Last administered on 5/14/20at 22:57;  Start 

5/14/20 at 22:00;  Stop 5/15/20 at 21:59;  Status DC


Potassium Chloride 90 meq/ Magnesium Sulfate 20 meq/ Multivitamins 10 

ml/Chromium/ Copper/Manganese/ Seleni/Zn 1 ml/ Insulin Human Regular 15 unit/ 

Total Parenteral Nutrition/Amino Acids/Dextrose/ Fat Emulsion Intravenous 1,920 

ml @  80 mls/hr TPN  CONT IV  Last administered on 5/15/20at 22:48;  Start 

5/15/20 at 22:00;  Stop 5/16/20 at 21:59;  Status DC


Potassium Chloride 90 meq/ Magnesium Sulfate 20 meq/ Multivitamins 10 

ml/Chromium/ Copper/Manganese/ Seleni/Zn 1 ml/ Insulin Human Regular 15 unit/ 

Total Parenteral Nutrition/Amino Acids/Dextrose/ Fat Emulsion Intravenous 1,890 

ml @  78.75 mls/ hr TPN  CONT IV  Last administered on 5/16/20at 22:15;  Start 

5/16/20 at 22:00;  Stop 5/17/20 at 21:59;  Status DC


Linezolid/Dextrose 300 ml @  300 mls/hr Q12HR IV  Last administered on 5/19/20at

21:08;  Start 5/17/20 at 09:00;  Stop 5/20/20 at 08:11;  Status DC


Daptomycin 450 mg/ Sodium Chloride 50 ml @  100 mls/hr Q24H IV  Last 

administered on 5/20/20at 09:25;  Start 5/17/20 at 09:00;  Stop 5/21/20 at 

08:30;  Status DC


Potassium Chloride 90 meq/ Magnesium Sulfate 20 meq/ Multivitamins 10 

ml/Chromium/ Copper/Manganese/ Seleni/Zn 1 ml/ Insulin Human Regular 15 unit/ 

Total Parenteral Nutrition/Amino Acids/Dextrose/ Fat Emulsion Intravenous 1,890 

ml @  78.75 mls/ hr TPN  CONT IV  Last administered on 5/17/20at 21:34;  Start 

5/17/20 at 22:00;  Stop 5/18/20 at 21:59;  Status DC


Lorazepam (Ativan Inj) 2 mg STK-MED ONCE .ROUTE ;  Start 5/17/20 at 14:58;  Stop

5/17/20 at 14:58;  Status DC


Metoprolol Tartrate (Lopressor Vial) 5 mg 1X  ONCE IVP  Last administered on 

5/17/20at 15:31;  Start 5/17/20 at 15:15;  Stop 5/17/20 at 15:16;  Status DC


Lorazepam (Ativan Inj) 2 mg 1X  ONCE IVP  Last administered on 5/17/20at 15:30; 

Start 5/17/20 at 15:15;  Stop 5/17/20 at 15:16;  Status DC


Enoxaparin Sodium (Lovenox 40mg Syringe) 40 mg Q24H SQ  Last administered on 

6/5/20at 17:44;  Start 5/17/20 at 17:00;  Stop 6/7/20 at 06:50;  Status DC


Lorazepam (Ativan Inj) 1 mg PRN Q4HRS  PRN IVP ANXIETY / AGITATION MILD-MOD Last

administered on 5/31/20at 15:55;  Start 5/17/20 at 19:15;  Stop 6/2/20 at 11:45;

 Status DC


Lorazepam (Ativan Inj) 2 mg PRN Q4HRS  PRN IVP ANXIETY / AGITATION SEVERE Last 

administered on 6/1/20at 07:55;  Start 5/17/20 at 19:15;  Stop 6/2/20 at 11:45; 

Status DC


Fentanyl Citrate (Fentanyl 2ml Vial) 50 mcg PRN Q4HRS  PRN IVP SEVERE PAIN Last 

administered on 6/9/20at 04:40;  Start 5/18/20 at 13:15


Fentanyl Citrate (Fentanyl 2ml Vial) 25 mcg PRN Q4HRS  PRN IVP MODERATE PAIN 

Last administered on 6/9/20at 02:29;  Start 5/18/20 at 13:15


Potassium Chloride 90 meq/ Magnesium Sulfate 20 meq/ Multivitamins 10 

ml/Chromium/ Copper/Manganese/ Seleni/Zn 1 ml/ Insulin Human Regular 15 unit/ 

Total Parenteral Nutrition/Amino Acids/Dextrose/ Fat Emulsion Intravenous 1,890 

ml @  78.75 mls/ hr TPN  CONT IV  Last administered on 5/18/20at 22:18;  Start 

5/18/20 at 22:00;  Stop 5/19/20 at 21:59;  Status DC


Furosemide (Lasix) 40 mg 1X  ONCE IVP  Last administered on 5/18/20at 21:51;  

Start 5/18/20 at 21:45;  Stop 5/18/20 at 21:48;  Status DC


Albumin Human 100 ml @  100 mls/hr 1X PRN  PRN IV SEE COMMENTS;  Start 5/19/20 

at 01:30


Furosemide (Lasix) 40 mg [id number] IVP  Last administered on 6/3/20at 08:04;  Start 

5/19/20 at 14:00;  Stop 6/3/20 at 13:07;  Status DC


Potassium Chloride 90 meq/ Magnesium Sulfate 20 meq/ Multivitamins 10 

ml/Chromium/ Copper/Manganese/ Seleni/Zn 1 ml/ Insulin Human Regular 15 unit/ 

Total Parenteral Nutrition/Amino Acids/Dextrose/ Fat Emulsion Intravenous 1,800 

ml @  75 mls/hr TPN  CONT IV  Last administered on 5/19/20at 22:31;  Start 

5/19/20 at 22:00;  Stop 5/20/20 at 21:59;  Status DC


Potassium Chloride 90 meq/ Magnesium Sulfate 20 meq/ Multivitamins 10 

ml/Chromium/ Copper/Manganese/ Seleni/Zn 1 ml/ Insulin Human Regular 15 unit/ 

Total Parenteral Nutrition/Amino Acids/Dextrose/ Fat Emulsion Intravenous 1,800 

ml @  75 mls/hr TPN  CONT IV  Last administered on 5/20/20at 22:28;  Start 

5/20/20 at 22:00;  Stop 5/21/20 at 21:59;  Status DC


Potassium Chloride 110 meq/ Magnesium Sulfate 20 meq/ Multivitamins 10 

ml/Chromium/ Copper/Manganese/ Seleni/Zn 1 ml/ Insulin Human Regular 15 unit/ 

Total Parenteral Nutrition/Amino Acids/Dextrose/ Fat Emulsion Intravenous 1,800 

ml @  75 mls/hr TPN  CONT IV  Last administered on 5/21/20at 22:01;  Start 

5/21/20 at 22:00;  Stop 5/22/20 at 21:59;  Status DC


Saliva Substitute (Biotene Moisturizing Mouth) 2 spray PRN Q15MIN  PRN PO DRY 

MOUTH;  Start 5/21/20 at 11:00


Potassium Chloride 110 meq/ Magnesium Sulfate 20 meq/ Multivitamins 10 

ml/Chromium/ Copper/Manganese/ Seleni/Zn 1 ml/ Insulin Human Regular 15 unit/ 

Total Parenteral Nutrition/Amino Acids/Dextrose/ Fat Emulsion Intravenous 1,800 

ml @  75 mls/hr TPN  CONT IV  Last administered on 5/22/20at 22:21;  Start 

5/22/20 at 22:00;  Stop 5/23/20 at 21:59;  Status DC


Potassium Chloride 110 meq/ Magnesium Sulfate 20 meq/ Multivitamins 10 

ml/Chromium/ Copper/Manganese/ Seleni/Zn 1 ml/ Insulin Human Regular 15 unit/ 

Total Parenteral Nutrition/Amino Acids/Dextrose/ Fat Emulsion Intravenous 1,800 

ml @  75 mls/hr TPN  CONT IV  Last administered on 5/23/20at 22:04;  Start 

5/23/20 at 22:00;  Stop 5/24/20 at 21:59;  Status DC


Potassium Chloride 110 meq/ Magnesium Sulfate 20 meq/ Multivitamins 10 

ml/Chromium/ Copper/Manganese/ Seleni/Zn 1 ml/ Insulin Human Regular 15 unit/ 

Total Parenteral Nutrition/Amino Acids/Dextrose/ Fat Emulsion Intravenous 1,800 

ml @  75 mls/hr TPN  CONT IV  Last administered on 5/24/20at 22:48;  Start 

5/24/20 at 22:00;  Stop 5/25/20 at 21:59;  Status DC


Potassium Chloride 70 meq/ Magnesium Sulfate 20 meq/ Multivitamins 10 

ml/Chromium/ Copper/Manganese/ Seleni/Zn 1 ml/ Insulin Human Regular 15 unit/ 

Total Parenteral Nutrition/Amino Acids/Dextrose/ Fat Emulsion Intravenous 1,800 

ml @  75 mls/hr TPN  CONT IV  Last administered on 5/25/20at 21:39;  Start 

5/25/20 at 22:00;  Stop 5/26/20 at 21:59;  Status DC


Meropenem 500 mg/ Sodium Chloride 50 ml @  100 mls/hr Q6HRS IV  Last 

administered on 5/27/20at 06:02;  Start 5/25/20 at 18:00;  Stop 5/27/20 at 

09:59;  Status DC


Barium Sulfate (Varibar Thin Liquid Apple) 148 gm 1X  ONCE PO ;  Start 5/26/20 

at 11:45;  Stop 5/26/20 at 11:49;  Status DC


Potassium Chloride 70 meq/ Magnesium Sulfate 20 meq/ Multivitamins 10 

ml/Chromium/ Copper/Manganese/ Seleni/Zn 1 ml/ Insulin Human Regular 15 unit/ 

Total Parenteral Nutrition/Amino Acids/Dextrose/ Fat Emulsion Intravenous 1,800 

ml @  75 mls/hr TPN  CONT IV  Last administered on 5/26/20at 22:27;  Start 

5/26/20 at 22:00;  Stop 5/27/20 at 21:59;  Status DC


Piperacillin Sod/ Tazobactam Sod 3.375 gm/Sodium Chloride 50 ml @  100 mls/hr 

Q6HRS IV  Last administered on 6/4/20at 06:10;  Start 5/27/20 at 12:00;  Stop 

6/4/20 at 07:26;  Status DC


Potassium Chloride 70 meq/ Magnesium Sulfate 20 meq/ Multivitamins 10 

ml/Chromium/ Copper/Manganese/ Seleni/Zn 1 ml/ Insulin Human Regular 15 unit/ 

Total Parenteral Nutrition/Amino Acids/Dextrose/ Fat Emulsion Intravenous 1,800 

ml @  75 mls/hr TPN  CONT IV  Last administered on 5/27/20at 22:03;  Start 

5/27/20 at 22:00;  Stop 5/28/20 at 21:59;  Status DC


Potassium Chloride 70 meq/ Magnesium Sulfate 20 meq/ Multivitamins 10 

ml/Chromium/ Copper/Manganese/ Seleni/Zn 1 ml/ Insulin Human Regular 15 unit/ 

Total Parenteral Nutrition/Amino Acids/Dextrose/ Fat Emulsion Intravenous 1,800 

ml @  75 mls/hr TPN  CONT IV  Last administered on 5/28/20at 22:33;  Start 

5/28/20 at 22:00;  Stop 5/29/20 at 21:59;  Status DC


Potassium Chloride 70 meq/ Magnesium Sulfate 20 meq/ Multivitamins 10 

ml/Chromium/ Copper/Manganese/ Seleni/Zn 1 ml/ Insulin Human Regular 15 unit/ 

Total Parenteral Nutrition/Amino Acids/Dextrose/ Fat Emulsion Intravenous 1,800 

ml @  75 mls/hr TPN  CONT IV  Last administered on 5/29/20at 23:13;  Start 

5/29/20 at 22:00;  Stop 5/30/20 at 21:59;  Status DC


Potassium Chloride 80 meq/ Magnesium Sulfate 20 meq/ Multivitamins 10 

ml/Chromium/ Copper/Manganese/ Seleni/Zn 1 ml/ Insulin Human Regular 15 unit/ 

Total Parenteral Nutrition/Amino Acids/Dextrose/ Fat Emulsion Intravenous 1,800 

ml @  75 mls/hr TPN  CONT IV  Last administered on 5/30/20at 22:30;  Start 

5/30/20 at 22:00;  Stop 5/31/20 at 21:59;  Status DC


Potassium Chloride 80 meq/ Magnesium Sulfate 20 meq/ Multivitamins 10 

ml/Chromium/ Copper/Manganese/ Seleni/Zn 1 ml/ Insulin Human Regular 15 unit/ 

Total Parenteral Nutrition/Amino Acids/Dextrose/ Fat Emulsion Intravenous 1,800 

ml @  75 mls/hr TPN  CONT IV  Last administered on 5/31/20at 21:54;  Start 

5/31/20 at 22:00;  Stop 6/1/20 at 21:59;  Status DC


Potassium Chloride/Water 100 ml @  100 mls/hr 1X  ONCE IV  Last administered on 

6/1/20at 10:15;  Start 6/1/20 at 10:00;  Stop 6/1/20 at 10:59;  Status DC


Potassium Chloride 90 meq/ Magnesium Sulfate 20 meq/ Multivitamins 10 

ml/Chromium/ Copper/Manganese/ Seleni/Zn 1 ml/ Insulin Human Regular 20 unit/ 

Total Parenteral Nutrition/Amino Acids/Dextrose/ Fat Emulsion Intravenous 1,800 

ml @  75 mls/hr TPN  CONT IV  Last administered on 6/1/20at 22:28;  Start 6/1/20

at 22:00;  Stop 6/2/20 at 21:59;  Status DC


Potassium Chloride 90 meq/ Magnesium Sulfate 20 meq/ Multivitamins 10 ml/

mium/ Copper/Manganese/ Seleni/Zn 1 ml/ Insulin Human Regular 20 unit/ Total 

Parenteral Nutrition/Amino Acids/Dextrose/ Fat Emulsion Intravenous 1,800 ml @  

75 mls/hr TPN  CONT IV  Last administered on 6/2/20at 22:08;  Start 6/2/20 at 

22:00;  Stop 6/3/20 at 21:59;  Status DC


Lorazepam (Ativan Inj) 0.25 mg PRN Q4HRS  PRN IVP ANXIETY / AGITATION Last 

administered on 6/9/20at 03:31;  Start 6/3/20 at 07:30


Potassium Chloride 90 meq/ Magnesium Sulfate 20 meq/ Multivitamins 10 

ml/Chromium/ Copper/Manganese/ Seleni/Zn 1 ml/ Insulin Human Regular 20 unit/ 

Total Parenteral Nutrition/Amino Acids/Dextrose/ Fat Emulsion Intravenous 1,800 

ml @  75 mls/hr TPN  CONT IV  Last administered on 6/3/20at 23:13;  Start 6/3/20

at 22:00;  Stop 6/4/20 at 21:59;  Status DC


Furosemide (Lasix) 40 mg DAILY IVP  Last administered on 6/5/20at 11:14;  Start 

6/3/20 at 13:30;  Stop 6/7/20 at 09:12;  Status DC


Fluoxetine HCl (PROzac) 20 mg QHS PEG  Last administered on 6/8/20at 20:55;  

Start 6/4/20 at 21:00


Fentanyl (Duragesic 50mcg/ Hr Patch) 1 patch Q72H TD  Last administered on 

6/4/20at 21:22;  Start 6/4/20 at 21:00


Potassium Chloride 40 meq/ Potassium Acetate 60 meq/Magnesium Sulfate 10 meq/ 

Multivitamins 10 ml/Chromium/ Copper/Manganese/ Seleni/Zn 1 ml/ Insulin Human 

Regular 20 unit/ Total Parenteral Nutrition/Amino Acids/Dextrose/ Fat Emulsion 

Intravenous 1,800 ml @  75 mls/hr TPN  CONT IV  Last administered on 6/5/20at 

00:03;  Start 6/4/20 at 22:00;  Stop 6/5/20 at 21:59;  Status DC


Potassium Acetate 80 meq/Magnesium Sulfate 5 meq/ Multivitamins 10 ml/Chromium/ 

Copper/Manganese/ Seleni/Zn 1 ml/ Insulin Human Regular 20 unit/ Total 

Parenteral Nutrition/Amino Acids/Dextrose/ Fat Emulsion Intravenous 1,920 ml @  

80 mls/hr TPN  CONT IV  Last administered on 6/5/20at 21:59;  Start 6/5/20 at 22

:00;  Stop 6/6/20 at 21:59;  Status DC


Potassium Acetate 60 meq/Magnesium Sulfate 5 meq/ Multivitamins 10 ml/Chromium/ 

Copper/Manganese/ Seleni/Zn 1 ml/ Insulin Human Regular 30 unit/ Total 

Parenteral Nutrition/Amino Acids/Dextrose/ Fat Emulsion Intravenous 1,920 ml @  

80 mls/hr TPN  CONT IV  Last administered on 6/6/20at 21:54;  Start 6/6/20 at 

22:00;  Stop 6/7/20 at 21:59;  Status DC


Norepinephrine Bitartrate 8 mg/ Dextrose 258 ml @  13.332 mls/ hr CONT  PRN IV 

PER PROTOCOL Last administered on 6/7/20at 21:46;  Start 6/7/20 at 06:30


Albumin Human 500 ml @  125 mls/hr 1X  ONCE IV  Last administered on 6/7/20at 

08:10;  Start 6/7/20 at 08:15;  Stop 6/7/20 at 12:14;  Status DC


Potassium Acetate 40 meq/Magnesium Sulfate 5 meq/ Multivitamins 10 ml/Chromium/ 

Copper/Manganese/ Seleni/Zn 1 ml/ Insulin Human Regular 30 unit/ Total 

Parenteral Nutrition/Amino Acids/Dextrose/ Fat Emulsion Intravenous 1,920 ml @  

80 mls/hr TPN  CONT IV  Last administered on 6/7/20at 22:23;  Start 6/7/20 at 

22:00;  Stop 6/8/20 at 21:59;  Status DC


Meropenem 1 gm/ Sodium Chloride 100 ml @  200 mls/hr Q8HRS IV ;  Start 6/7/20 at

14:00;  Status Cancel


Meropenem 1 gm/ Sodium Chloride 100 ml @  200 mls/hr Q8HRS IV  Last administered

on 6/7/20at 11:04;  Start 6/7/20 at 10:00;  Stop 6/7/20 at 13:00;  Status DC


Meropenem 1 gm/ Sodium Chloride 100 ml @  200 mls/hr Q12HR IV  Last administered

on 6/8/20at 20:55;  Start 6/7/20 at 21:00


Sodium Chloride 1,000 ml @  1,000 mls/hr 1X  ONCE IV  Last administered on 

6/7/20at 11:06;  Start 6/7/20 at 10:45;  Stop 6/7/20 at 11:44;  Status DC


Micafungin Sodium 100 mg/Dextrose 100 ml @  100 mls/hr Q24H IV  Last 

administered on 6/8/20at 11:50;  Start 6/7/20 at 11:00


Daptomycin 410 mg/ Sodium Chloride 50 ml @  100 mls/hr Q24H IV  Last 

administered on 6/8/20at 15:33;  Start 6/7/20 at 14:00


Midazolam HCl (Versed) 2 mg STK-MED ONCE .ROUTE ;  Start 6/7/20 at 14:47;  Stop 

6/7/20 at 14:48;  Status DC


Fentanyl Citrate (Fentanyl 2ml Vial) 100 mcg STK-MED ONCE .ROUTE ;  Start 6/7/20

at 14:47;  Stop 6/7/20 at 14:48;  Status DC


Flumazenil (Romazicon) 0.5 mg STK-MED ONCE IV ;  Start 6/7/20 at 14:48;  Stop 

6/7/20 at 14:48;  Status DC


Naloxone HCl (Narcan) 0.4 mg STK-MED ONCE .ROUTE ;  Start 6/7/20 at 14:48;  Stop

6/7/20 at 14:48;  Status DC


Lidocaine HCl (Lidocaine 1% 20ml Vial) 20 ml STK-MED ONCE .ROUTE ;  Start 6/7/20

at 14:48;  Stop 6/7/20 at 14:48;  Status DC


Midazolam HCl (Versed) 2 mg 1X  ONCE IV  Last administered on 6/7/20at 15:28;  

Start 6/7/20 at 15:00;  Stop 6/7/20 at 15:01;  Status DC


Fentanyl Citrate (Fentanyl 2ml Vial) 100 mcg 1X  ONCE IV  Last administered on 

6/7/20at 15:28;  Start 6/7/20 at 15:00;  Stop 6/7/20 at 15:01;  Status DC


Lidocaine HCl (Lidocaine 1% 20ml Vial) 20 ml 1X  ONCE INJ  Last administered on 

6/7/20at 15:30;  Start 6/7/20 at 15:00;  Stop 6/7/20 at 15:01;  Status DC


Sodium Chloride 1,000 ml @  100 mls/hr Q10H IV  Last administered on 6/9/20at 

06:20;  Start 6/7/20 at 20:00


Sodium Bicarbonate (Sodium Bicarb Adult 8.4% Syr) 50 meq 1X  ONCE IV  Last 

administered on 6/7/20at 21:47;  Start 6/7/20 at 22:00;  Stop 6/7/20 at 22:01;  

Status DC


Potassium Acetate 40 meq/Magnesium Sulfate 5 meq/ Multivitamins 10 ml/Chromium/ 

Copper/Manganese/ Seleni/Zn 1 ml/ Insulin Human Regular 30 unit/ Total 

Parenteral Nutrition/Amino Acids/Dextrose/ Fat Emulsion Intravenous 1,920 ml @  

80 mls/hr TPN  CONT IV  Last administered on 6/8/20at 22:28;  Start 6/8/20 at 

22:00;  Stop 6/9/20 at 21:59


Sodium Chloride 500 ml @  500 mls/hr 1X  ONCE IV  Last administered on 6/9/20at 

06:39;  Start 6/9/20 at 06:45;  Stop 6/9/20 at 07:44;  Status DC





Active Scripts


Active


Reported


Bisoprolol Fumarate 5 Mg Tablet 10 Mg PO DAILY


Vitals/I & O





Vital Sign - Last 24 Hours








 6/8/20 6/8/20 6/8/20 6/8/20





 08:40 09:00 10:00 11:00


 


Pulse  99 97 104


 


Resp  18 20 20


 


B/P (MAP)  108/62 (77) 99/54 (69) 95/65 (75)


 


Pulse Ox  100 100 100


 


O2 Delivery AVAPS ON V60 BiPAP/CPAP BiPAP/CPAP BiPAP/CPAP





 6/8/20 6/8/20 6/8/20 6/8/20





 11:49 12:00 12:00 12:07


 


Temp  98.0  





  98.0  


 


Pulse  117  


 


Resp  20  


 


B/P (MAP)  123/56 (78)  


 


Pulse Ox 100 100  99


 


O2 Delivery AVAPS ON V60 BiPAP/CPAP Bi-pap Tracheal Collar


 


    





    





 6/8/20 6/8/20 6/8/20 6/8/20





 12:13 13:00 13:03 14:00


 


Pulse  122  115


 


Resp 12 24 20 20


 


B/P (MAP)  113/59 (77)  109/62 (78)


 


Pulse Ox 100 100 99 100


 


O2 Delivery Tracheal Collar BiPAP/CPAP Room Air BiPAP/CPAP





 6/8/20 6/8/20 6/8/20 6/8/20





 15:00 16:00 16:00 17:00


 


Temp  98.0  





  98.0  


 


Pulse 118 119  120


 


Resp 20 20  23


 


B/P (MAP) 111/61 (78) 112/60 (77)  107/64 (78)


 


Pulse Ox 100 100  100


 


O2 Delivery BiPAP/CPAP BiPAP/CPAP Bi-pap Tracheal Collar


 


    





    





 6/8/20 6/8/20 6/8/20 6/8/20





 18:00 18:35 19:00 19:58


 


Pulse 130  125 


 


Resp 26 16 33 


 


B/P (MAP) 129/66 (87)  119/66 (83) 


 


Pulse Ox 100 92 97 96


 


O2 Delivery Tracheal Collar Tracheal Collar Tracheal Collar Tracheal Collar


 


O2 Flow Rate   8.0 8.0





 6/8/20 6/8/20 6/8/20 6/8/20





 20:00 20:00 21:00 22:00


 


Temp 97.7   





 97.7   


 


Pulse 121  120 135


 


Resp 29  30 45


 


B/P (MAP) 110/65 (80)  115/60 (78) 154/71 (98)


 


Pulse Ox 97  98 91


 


O2 Delivery Tracheal Collar Trach Collar Tracheal Collar Tracheal Collar


 


O2 Flow Rate 8.0 8.0 8.0 8.0


 


    





    





 6/8/20 6/9/20 6/9/20 6/9/20





 23:00 00:00 00:00 01:00


 


Temp  98.4  





  98.4  


 


Pulse 136 132  137


 


Resp 36 39  43


 


B/P (MAP) 135/68 (90) 133/69 (90)  163/74 (103)


 


Pulse Ox 94 93  96


 


O2 Delivery Tracheal Collar Tracheal Collar Trach Collar Tracheal Collar


 


O2 Flow Rate 8.0 8.0 8.0 8.0


 


    





    





 6/9/20 6/9/20 6/9/20 6/9/20





 01:32 02:00 03:00 04:00


 


Pulse  141 142 


 


Resp  45 47 


 


B/P (MAP)  166/78 (107) 152/72 (98) 


 


Pulse Ox 99 93 97 


 


O2 Delivery Tracheal Collar Tracheal Collar Tracheal Collar Trach Collar


 


O2 Flow Rate 8.0 8.0 8.0 8.0





 6/9/20 6/9/20 6/9/20 6/9/20





 04:00 05:00 05:30 06:00


 


Temp 99.6 99.0  97.8





 99.6 99.0  97.8


 


Pulse 146 144  138


 


Resp 47 43  43


 


B/P (MAP) 160/70 (100) 134/66 (88)  144/64 (90)


 


Pulse Ox 96 95  98


 


O2 Delivery Tracheal Collar Tracheal Collar  Tracheal Collar


 


O2 Flow Rate 8.0 8.0 10.0 10.0


 


    





    





 6/9/20 6/9/20 6/9/20 





 07:00 07:57 08:00 


 


Temp   99.5 





   99.5 


 


Pulse 148  144 


 


Resp 46  49 


 


B/P (MAP) 172/74 (106)  142/74 (96) 


 


Pulse Ox 98  95 


 


O2 Delivery Tracheal Collar Trach Collar Tracheal Collar 


 


O2 Flow Rate 10.0 10.0 10.0 














Intake and Output   


 


 6/8/20 6/8/20 6/9/20





 15:00 23:00 07:00


 


Intake Total 0 ml 2549 ml 2242 ml


 


Output Total 630 ml 730 ml 1135 ml


 


Balance -630 ml 1819 ml 1107 ml











Hemodynamically unstable?:  No


Is patient in severe pain?:  No


Is NPO status required?:  No











GAETANO GONCALVES MD         Jun 9, 2020 08:21

## 2020-06-09 NOTE — PDOC
Infectious Disease Note


Subjective:


Subjective


Patient remains lethargic


tachycardic, tachpneic


lot of resp secretions


NGT clamped


blood stained drainage from drains


T-max 99.6





Vital Signs:


Vital Signs





Vital Signs








  Date Time  Temp Pulse Resp B/P (MAP) Pulse Ox O2 Delivery O2 Flow Rate FiO2


 


6/9/20 07:00  148 46 172/74 (106) 98 Tracheal Collar 10.0 


 


6/9/20 06:00 97.8       





 97.8       











Physical Exam:


PHYSICAL EXAM


GENERAL:lethargic, arousable though extremely weak


HEENT: NGT in place. Oral mucosa dry


NECK:  Tracheostomy 


LUNGS: Diminished aeration bases, no accessory muscle use 


HEART:  S1, S2, tachy, regular 


ABDOMEN: Mild distention, bowel sounds present, soft, grimaces to palpation 


Right lateral side drainage bag, 3 drains with bloodstained drainage


: [place] ( 6/ 7)


EXTREMITIES: Trace edema .no  cyanosis 


SKIN: no signs of gen rash 


CNS: Lethargic 


LUE-PICC (5/29) without signs of complications





Medications:


Inpatient Meds:





Current Medications








 Medications


  (Trade)  Dose


 Ordered  Sig/Yee  Start Time


 Stop Time Status Last Admin


Dose Admin


 


 Acetaminophen


  (Tylenol Supp)  650 mg  PRN Q6HRS  PRN  3/24/20 10:30


    6/7/20 14:41


650 MG


 


 Acetaminophen


  (Tylenol)  650 mg  PRN Q6HRS  PRN  3/21/20 03:36


 5/13/20 10:25 DC 4/16/20 19:56


650 MG


 


 Albumin Human  500 ml @ 


 125 mls/hr  1X  ONCE  6/7/20 08:15


 6/7/20 12:14 DC 6/7/20 08:10


125 MLS/HR


 


 Albuterol Sulfate


  (Ventolin Neb


 Soln)  2.5 mg  1X  ONCE  3/17/20 22:30


 3/17/20 22:31 DC 3/18/20 00:56


2.5 MG


 


 Alteplase,


 Recombinant


  (Cathflo For


 Central Catheter


 Clearance)  1 mg  1X  ONCE  4/24/20 10:45


 4/24/20 10:46 DC 4/24/20 11:44


1 MG


 


 Amino Acids/


 Glycerin/


 Electrolytes  1,000 ml @ 


 75 mls/hr  [id number]  4/20/20 21:15


   UNV  





 


 Artificial Tears


  (Artificial


 Tears)  1 drop  PRN Q15MIN  PRN  4/29/20 05:30


    5/29/20 10:08


1 DROP


 


 Atenolol


  (Tenormin)  100 mg  DAILY  3/17/20 09:00


 3/16/20 20:08 DC  





 


 Atropine Sulfate


  (ATROPINE 0.5mg


 SYRINGE)  0.5 mg  PRN Q5MIN  PRN  4/2/20 08:15


     





 


 Barium Sulfate


  (Varibar Thin


 Liquid Apple)  148 gm  1X  ONCE  5/26/20 11:45


 5/26/20 11:49 DC  





 


 Benzocaine


  (Hurricaine One)  1 spray  1X  ONCE  3/20/20 14:30


 3/20/20 14:31 DC 3/20/20 16:38


1 SPRAY


 


 Bisacodyl


  (Dulcolax Supp)  10 mg  STK-MED ONCE  4/27/20 10:59


 4/27/20 10:59 DC  





 


 Bumetanide


  (Bumex)  2 mg  DAILY  5/8/20 10:00


 5/18/20 17:15 DC 5/18/20 08:07


2 MG


 


 Bupivacaine HCl/


 Epinephrine Bitart


  (Sensorcain-Epi


 0.5%-1:828421 Mpf)  30 ml  STK-MED ONCE  4/27/20 10:58


 4/27/20 10:58 DC 4/27/20 12:01


7 ML


 


 Calcium Carbonate/


 Glycine


  (Tums)  500 mg  PRN [id number]  PRN  3/18/20 17:45


 5/13/20 10:25 DC  





 


 Calcium Chloride


 1000 mg/Sodium


 Chloride  110 ml @ 


 220 mls/hr  1X  ONCE  3/17/20 22:30


 3/17/20 22:59 DC 3/17/20 22:11


220 MLS/HR


 


 Calcium Chloride


 3000 mg/Sodium


 Chloride  1,030 ml @ 


 50 mls/hr  [id number]  3/19/20 08:00


 3/21/20 15:23 DC 3/21/20 02:17


50 MLS/HR


 


 Calcium Gluconate


  (Calcium


 Gluconate)  2,000 mg  1X  ONCE  3/19/20 02:15


 3/19/20 02:16 DC 3/19/20 02:19


2,000 MG


 


 Calcium Gluconate


 1000 mg/Sodium


 Chloride  110 ml @ 


 220 mls/hr  1X  ONCE  3/18/20 03:30


 3/18/20 03:59 DC 3/18/20 03:21


220 MLS/HR


 


 Calcium Gluconate


 2000 mg/Sodium


 Chloride  120 ml @ 


 220 mls/hr  1X  ONCE  3/18/20 07:30


 3/18/20 08:02 DC 3/18/20 09:05


220 MLS/HR


 


 Cefepime HCl


  (Maxipime)  2 gm  Q12HR  3/25/20 09:00


 4/8/20 09:58 DC 4/7/20 20:56


2 GM


 


 Cellulose


  (Surgicel


 Fibrillar 1x2)  1 each  STK-MED ONCE  4/6/20 11:00


 4/6/20 11:01 DC  





 


 Cellulose


  (Surgicel


 Hemostat 2x14)  1 each  STK-MED ONCE  4/27/20 10:58


 4/27/20 10:59 DC  





 


 Cellulose


  (Surgicel


 Hemostat 4x8)  1 each  STK-MED ONCE  4/27/20 10:58


 4/27/20 10:59 DC  





 


 Cyclobenzaprine


 HCl


  (Flexeril)  10 mg  PRN Q6HRS  PRN  4/30/20 10:45


     





 


 Daptomycin 410 mg/


 Sodium Chloride  50 ml @ 


 100 mls/hr  Q24H  6/7/20 14:00


    6/8/20 15:33


100 MLS/HR


 


 Daptomycin 430 mg/


 Sodium Chloride  50 ml @ 


 100 mls/hr  Q24H  4/25/20 13:00


 4/30/20 20:58 DC 4/30/20 13:00


100 MLS/HR


 


 Daptomycin 450 mg/


 Sodium Chloride  50 ml @ 


 100 mls/hr  Q24H  5/17/20 09:00


 5/21/20 08:30 DC 5/20/20 09:25


100 MLS/HR


 


 Daptomycin 485 mg/


 Sodium Chloride  50 ml @ 


 100 mls/hr  Q24H  5/4/20 11:00


 5/12/20 07:44 DC 5/11/20 13:10


100 MLS/HR


 


 Daptomycin 500 mg/


 Sodium Chloride  50 ml @ 


 100 mls/hr  Q48H  3/25/20 08:30


 4/10/20 10:07 DC 4/10/20 09:57


100 MLS/HR


 


 Dexamethasone


 Sodium Phosphate


  (Decadron)  4 mg  STK-MED ONCE  4/27/20 10:56


 4/27/20 10:57 DC  





 


 Dexmedetomidine


 HCl 400 mcg/


 Sodium Chloride  100 ml @ 0


 mls/hr  CONT  PRN  4/2/20 08:15


 5/30/20 18:31 DC 5/30/20 12:57


8 MLS/HR


 


 Dextrose


  (Dextrose


 50%-Water Syringe)  12.5 gm  PRN Q15MIN  PRN  3/16/20 09:30


     





 


 Digoxin


  (Lanoxin)  125 mcg  1X  ONCE  3/19/20 18:00


 3/19/20 18:01 DC 3/19/20 17:10


125 MCG


 


 Diphenhydramine


 HCl


  (Benadryl)  25 mg  1X PRN  PRN  4/24/20 15:45


 4/25/20 15:44 DC  





 


 Duloxetine HCl


  (Cymbalta)  30 mg  DAILY  5/10/20 14:00


 5/13/20 10:25 DC 5/11/20 09:48


30 MG


 


 Enoxaparin Sodium


  (Lovenox 100mg


 Syringe)  100 mg  Q12HR  4/21/20 21:00


   UNV  





 


 Enoxaparin Sodium


  (Lovenox 40mg


 Syringe)  40 mg  Q24H  5/17/20 17:00


 6/7/20 06:50 DC 6/5/20 17:44


40 MG


 


 Etomidate


  (Amidate)  8 mg  1X  ONCE  3/23/20 08:30


 3/23/20 08:31 DC 3/23/20 08:33


8 MG


 


 Fentanyl


  (Duragesic 50mcg/


 Hr Patch)  1 patch  Q72H  6/4/20 21:00


    6/4/20 21:22


1 PATCH


 


 Fentanyl Citrate


  (Fentanyl 2ml


 Vial)  100 mcg  1X  ONCE  6/7/20 15:00


 6/7/20 15:01 DC 6/7/20 15:28


50 MCG


 


 Fentanyl Citrate


  (Fentanyl 5ml


 Vial)  250 mcg  1X  ONCE  5/8/20 09:15


 5/8/20 09:16 DC 5/8/20 09:30


50 MCG


 


 Flumazenil


  (Romazicon)  0.5 mg  STK-MED ONCE  6/7/20 14:48


 6/7/20 14:48 DC  





 


 Fluoxetine HCl


  (PROzac)  20 mg  QHS  6/4/20 21:00


    6/8/20 20:55


20 MG


 


 Furosemide


  (Lasix)  40 mg  DAILY  6/3/20 13:30


 6/7/20 09:12 DC 6/5/20 11:14


40 MG


 


 Haloperidol


 Lactate


  (Haldol Inj)  3 mg  1X  ONCE  5/4/20 14:30


 5/4/20 14:31 DC 5/4/20 14:37


3 MG


 


 Heparin Sodium


  (Porcine)


  (Hep Lock Adult)  500 unit  STK-MED ONCE  4/7/20 09:29


 4/7/20 09:30 DC  





 


 Heparin Sodium


  (Porcine)


  (Heparin Sodium)  5,000 unit  Q12HR  4/27/20 21:00


 5/7/20 09:59 DC 5/6/20 20:57


5,000 UNIT


 


 Heparin Sodium


  (Porcine) 1000


 unit/Sodium


 Chloride  1,001 ml @ 


 1,001 mls/hr  1X  ONCE  4/27/20 12:00


 4/27/20 12:59 DC  





 


 Hydromorphone HCl


  (Dilaudid


 Standard PCA)  12 mg  STK-MED ONCE  5/1/20 15:50


 5/12/20 11:24 DC  





 


 Hydromorphone HCl


  (Dilaudid)  1 mg  PRN Q4HRS  PRN  5/4/20 19:00


 5/18/20 17:10 DC 5/18/20 06:25


1 MG


 


 Info


  (CONTRAST GIVEN


 -- Rx MONITORING)  1 each  PRN DAILY  PRN  3/30/20 11:45


 4/1/20 11:44 DC  





 


 Info


  (Icu Electrolyte


 Protocol)  1 ea  CONT PRN  PRN  3/29/20 13:15


     





 


 Info


  (PHARMACY


 MONITORING -- do


 not chart)  1 each  PRN DAILY  PRN  4/24/20 15:45


 5/26/20 14:14 DC  





 


 Info


  (Tpn Per


 Pharmacy)  1 each  PRN DAILY  PRN  3/18/20 12:30


   UNV  





 


 Insulin Human


 Lispro


  (HumaLOG)  0-9 UNITS  Q6HRS  3/16/20 09:30


    6/8/20 18:27


4 UNITS


 


 Insulin Human


 Regular


  (HumuLIN R VIAL)  5 unit  1X  ONCE  3/17/20 22:30


 3/17/20 22:31 DC 3/17/20 22:14


5 UNIT


 


 Iohexol


  (Omnipaque 240


 Mg/ml)  30 ml  1X  ONCE  3/30/20 11:30


 3/30/20 11:33 DC 3/30/20 11:30


30 ML


 


 Iohexol


  (Omnipaque 300


 Mg/ml)  50 ml  STK-MED ONCE  4/27/20 10:58


 4/27/20 10:59 DC  





 


 Iohexol


  (Omnipaque 350


 Mg/ml)  90 ml  1X  ONCE  3/16/20 03:30


 3/16/20 03:31 DC 3/16/20 03:25


90 ML


 


 Ketorolac


 Tromethamine


  (Toradol 30mg


 Vial)  30 mg  1X  ONCE  3/16/20 03:00


 3/16/20 03:01 DC 3/16/20 02:54


30 MG


 


 Lidocaine HCl


  (Buffered


 Lidocaine 1%)  6 ml  1X  ONCE  5/12/20 14:15


 5/12/20 14:16 DC 5/12/20 14:15


3 ML


 


 Lidocaine HCl


  (Glydo


  (Lidocaine) Jelly)  1 thomas  1X  ONCE  3/20/20 14:30


 3/20/20 14:31 DC 3/20/20 16:38


1 THOMAS


 


 Lidocaine HCl


  (Lidocaine 1%


 20ml Vial)  20 ml  1X  ONCE  6/7/20 15:00


 6/7/20 15:01 DC 6/7/20 15:30


20 ML


 


 Lidocaine HCl


  (Xylocaine-Mpf


 1% 2ml Vial)  2 ml  PRN 1X  PRN  4/27/20 07:00


 4/28/20 06:59 DC  





 


 Linezolid/Dextrose  300 ml @ 


 300 mls/hr  Q12HR  5/17/20 09:00


 5/20/20 08:11 DC 5/19/20 21:08


300 MLS/HR


 


 Lorazepam


  (Ativan Inj)  0.25 mg  PRN Q4HRS  PRN  6/3/20 07:30


    6/9/20 03:31


0.25 MG


 


 Magnesium Sulfate  50 ml @ 25


 mls/hr  1X  ONCE  5/10/20 09:00


 5/10/20 10:59 DC 5/10/20 10:44


25 MLS/HR


 


 Meropenem 1 gm/


 Sodium Chloride  100 ml @ 


 200 mls/hr  Q12HR  6/7/20 21:00


    6/8/20 20:55


200 MLS/HR


 


 Meropenem 500 mg/


 Sodium Chloride  50 ml @ 


 100 mls/hr  Q6HRS  5/25/20 18:00


 5/27/20 09:59 DC 5/27/20 06:02


100 MLS/HR


 


 Metoclopramide HCl


  (Reglan Vial)  10 mg  PRN Q3HRS  PRN  5/9/20 16:45


    5/14/20 04:25


10 MG


 


 Metoprolol


 Tartrate


  (Lopressor Vial)  5 mg  1X  ONCE  5/17/20 15:15


 5/17/20 15:16 DC 5/17/20 15:31


5 MG


 


 Metronidazole  100 ml @ 


 100 mls/hr  Q8HRS  4/14/20 10:00


 4/21/20 08:10 DC 4/21/20 06:04


100 MLS/HR


 


 Micafungin Sodium


 100 mg/Dextrose  100 ml @ 


 100 mls/hr  Q24H  6/7/20 11:00


    6/8/20 11:50


100 MLS/HR


 


 Midazolam HCl


  (Versed)  2 mg  1X  ONCE  6/7/20 15:00


 6/7/20 15:01 DC 6/7/20 15:28


1 MG


 


 Midazolam HCl 100


 mg/Sodium Chloride  100 ml @ 7


 mls/hr  CONT  PRN  3/28/20 16:00


 6/3/20 14:38 DC 4/8/20 15:35


7 MLS/HR


 


 Midazolam HCl 50


 mg/Sodium Chloride  50 ml @ 0


 mls/hr  CONT  PRN  3/23/20 08:15


 3/28/20 15:59 DC 3/26/20 22:39


7 MLS/HR


 


 Morphine Sulfate


  (Morphine


 Sulfate)  2 mg  PRN Q2HR  PRN  3/16/20 05:00


 3/17/20 14:15 DC 3/17/20 12:26


2 MG


 


 Multi-Ingred


 Cream/Lotion/Oil/


 Oint


  (Artificial


 Tears Eye


 Ointment)  1 thomas  PRN Q1HR  PRN  3/25/20 17:30


 6/3/20 14:39 DC 4/13/20 08:19


1 THOMAS


 


 Naloxone HCl


  (Narcan)  0.4 mg  STK-MED ONCE  6/7/20 14:48


 6/7/20 14:48 DC  





 


 Norepinephrine


 Bitartrate 8 mg/


 Dextrose  258 ml @ 


 13.332 mls/


 hr  CONT  PRN  6/7/20 06:30


    6/7/20 21:46


13.332 MLS/HR


 


 Ondansetron HCl


  (Zofran)  4 mg  STK-MED ONCE  4/27/20 10:56


 4/27/20 10:57 DC  





 


 Pantoprazole


 Sodium


  (PROTONIX VIAL


 for IV PUSH)  40 mg  DAILYAC  3/16/20 11:30


    6/8/20 07:46


40 MG


 


 Phenylephrine HCl


  (PHENYLEPHRINE


 in 0.9% NACL PF)  1 mg  STK-MED ONCE  4/27/20 12:34


 4/27/20 12:34 DC  





 


 Piperacillin Sod/


 Tazobactam Sod


 3.375 gm/Sodium


 Chloride  50 ml @ 


 100 mls/hr  Q6HRS  5/27/20 12:00


 6/4/20 07:26 DC 6/4/20 06:10


100 MLS/HR


 


 Piperacillin Sod/


 Tazobactam Sod


 4.5 gm/Sodium


 Chloride  100 ml @ 


 200 mls/hr  1X  ONCE  3/16/20 06:00


 3/16/20 06:29 DC 3/16/20 05:44


200 MLS/HR


 


 Potassium


 Chloride 110 meq/


 Magnesium Sulfate


 20 meq/


 Multivitamins 10


 ml/Chromium/


 Copper/Manganese/


 Seleni/Zn 1 ml/


 Insulin Human


 Regular 15 unit/


 Total Parenteral


 Nutrition/Amino


 Acids/Dextrose/


 Fat Emulsion


 Intravenous  1,800 ml @ 


 75 mls/hr  TPN  CONT  5/24/20 22:00


 5/25/20 21:59 DC 5/24/20 22:48


75 MLS/HR


 


 Potassium


 Chloride 15 meq/


 Bicarbonate


 Dialysis Soln w/


 out KCl  5,007.5 ml


  @ 1,000 mls/


 hr  Q5H1M  3/29/20 20:00


 4/2/20 13:08 DC 4/1/20 18:14


1,000 MLS/HR


 


 Potassium


 Chloride 20 meq/


 Bicarbonate


 Dialysis Soln w/


 out KCl  5,010 ml @ 


 1,000 mls/hr  Q5H1M  3/25/20 16:00


 3/29/20 19:59 DC 3/29/20 14:54


1,000 MLS/HR


 


 Potassium


 Chloride 40 meq/


 Potassium Acetate


 60 meq/Magnesium


 Sulfate 10 meq/


 Multivitamins 10


 ml/Chromium/


 Copper/Manganese/


 Seleni/Zn 1 ml/


 Insulin Human


 Regular 20 unit/


 Total Parenteral


 Nutrition/Amino


 Acids/Dextrose/


 Fat Emulsion


 Intravenous  1,800 ml @ 


 75 mls/hr  TPN  CONT  6/4/20 22:00


 6/5/20 21:59 DC 6/5/20 00:03


75 MLS/HR


 


 Potassium


 Chloride 70 meq/


 Magnesium Sulfate


 20 meq/


 Multivitamins 10


 ml/Chromium/


 Copper/Manganese/


 Seleni/Zn 1 ml/


 Insulin Human


 Regular 15 unit/


 Total Parenteral


 Nutrition/Amino


 Acids/Dextrose/


 Fat Emulsion


 Intravenous  1,800 ml @ 


 75 mls/hr  TPN  CONT  5/29/20 22:00


 5/30/20 21:59 DC 5/29/20 23:13


75 MLS/HR


 


 Potassium


 Chloride 75 meq/


 Magnesium Sulfate


 15 meq/


 Multivitamins 10


 ml/Chromium/


 Copper/Manganese/


 Seleni/Zn 0.5 ml/


 Insulin Human


 Regular 15 unit/


 Total Parenteral


 Nutrition/Amino


 Acids/Dextrose/


 Fat Emulsion


 Intravenous  1,920 ml @ 


 80 mls/hr  TPN  CONT  5/9/20 22:00


 5/10/20 21:59 DC 5/9/20 22:41


80 MLS/HR


 


 Potassium


 Chloride 75 meq/


 Magnesium Sulfate


 15 meq/Calcium


 Gluconate 8 meq/


 Multivitamins 10


 ml/Chromium/


 Copper/Manganese/


 Seleni/Zn 0.5 ml/


 Insulin Human


 Regular 15 unit/


 Total Parenteral


 Nutrition/Amino


 Acids/Dextrose/


 Fat Emulsion


 Intravenous  1,920 ml @ 


 80 mls/hr  TPN  CONT  5/7/20 22:00


 5/8/20 21:59 DC 5/7/20 22:28


80 MLS/HR


 


 Potassium


 Chloride 75 meq/


 Magnesium Sulfate


 15 meq/Calcium


 Gluconate 8 meq/


 Multivitamins 10


 ml/Chromium/


 Copper/Manganese/


 Seleni/Zn 0.5 ml/


 Insulin Human


 Regular 20 unit/


 Total Parenteral


 Nutrition/Amino


 Acids/Dextrose/


 Fat Emulsion


 Intravenous  1,920 ml @ 


 80 mls/hr  TPN  CONT  5/6/20 22:00


 5/7/20 21:59 DC 5/6/20 22:00


80 MLS/HR


 


 Potassium


 Chloride 75 meq/


 Magnesium Sulfate


 15 meq/Calcium


 Gluconate 8 meq/


 Multivitamins 10


 ml/Chromium/


 Copper/Manganese/


 Seleni/Zn 0.5 ml/


 Insulin Human


 Regular 25 unit/


 Total Parenteral


 Nutrition/Amino


 Acids/Dextrose/


 Fat Emulsion


 Intravenous  1,920 ml @ 


 80 mls/hr  TPN  CONT  5/4/20 22:00


 5/5/20 21:59 DC 5/4/20 23:08


80 MLS/HR


 


 Potassium


 Chloride 75 meq/


 Magnesium Sulfate


 20 meq/Calcium


 Gluconate 10 meq/


 Multivitamins 10


 ml/Chromium/


 Copper/Manganese/


 Seleni/Zn 0.5 ml/


 Insulin Human


 Regular 25 unit/


 Total Parenteral


 Nutrition/Amino


 Acids/Dextrose/


 Fat Emulsion


 Intravenous  1,920 ml @ 


 80 mls/hr  TPN  CONT  5/3/20 22:00


 5/4/20 21:59 DC 5/3/20 22:04


80 MLS/HR


 


 Potassium


 Chloride 75 meq/


 Magnesium Sulfate


 20 meq/Calcium


 Gluconate 10 meq/


 Multivitamins 10


 ml/Chromium/


 Copper/Manganese/


 Seleni/Zn 0.5 ml/


 Insulin Human


 Regular 30 unit/


 Total Parenteral


 Nutrition/Amino


 Acids/Dextrose/


 Fat Emulsion


 Intravenous  1,920 ml @ 


 80 mls/hr  TPN  CONT  5/2/20 22:00


 5/3/20 22:00 DC 5/2/20 21:51


80 MLS/HR


 


 Potassium


 Chloride 80 meq/


 Magnesium Sulfate


 20 meq/


 Multivitamins 10


 ml/Chromium/


 Copper/Manganese/


 Seleni/Zn 0.5 ml/


 Insulin Human


 Regular 15 unit/


 Total Parenteral


 Nutrition/Amino


 Acids/Dextrose/


 Fat Emulsion


 Intravenous  1,920 ml @ 


 80 mls/hr  TPN  CONT  5/12/20 22:00


 5/13/20 21:59 DC 5/12/20 21:40


80 MLS/HR


 


 Potassium


 Chloride 80 meq/


 Magnesium Sulfate


 20 meq/


 Multivitamins 10


 ml/Chromium/


 Copper/Manganese/


 Seleni/Zn 1 ml/


 Insulin Human


 Regular 15 unit/


 Total Parenteral


 Nutrition/Amino


 Acids/Dextrose/


 Fat Emulsion


 Intravenous  1,800 ml @ 


 75 mls/hr  TPN  CONT  5/31/20 22:00


 6/1/20 21:59 DC 5/31/20 21:54


75 MLS/HR


 


 Potassium


 Chloride 90 meq/


 Magnesium Sulfate


 20 meq/


 Multivitamins 10


 ml/Chromium/


 Copper/Manganese/


 Seleni/Zn 1 ml/


 Insulin Human


 Regular 15 unit/


 Total Parenteral


 Nutrition/Amino


 Acids/Dextrose/


 Fat Emulsion


 Intravenous  1,800 ml @ 


 75 mls/hr  TPN  CONT  5/20/20 22:00


 5/21/20 21:59 DC 5/20/20 22:28


75 MLS/HR


 


 Potassium


 Chloride 90 meq/


 Magnesium Sulfate


 20 meq/


 Multivitamins 10


 ml/Chromium/


 Copper/Manganese/


 Seleni/Zn 1 ml/


 Insulin Human


 Regular 20 unit/


 Total Parenteral


 Nutrition/Amino


 Acids/Dextrose/


 Fat Emulsion


 Intravenous  1,800 ml @ 


 75 mls/hr  TPN  CONT  6/3/20 22:00


 6/4/20 21:59 DC 6/3/20 23:13


75 MLS/HR


 


 Potassium


 Chloride/Water  100 ml @ 


 100 mls/hr  1X  ONCE  6/1/20 10:00


 6/1/20 10:59 DC 6/1/20 10:15


100 MLS/HR


 


 Potassium


 Phosphate 20 mmol/


 Sodium Chloride  106.6667


 ml @ 


 51.667 m...  1X  ONCE  3/25/20 13:00


 3/25/20 15:03 DC 3/25/20 12:51


51.667 MLS/HR


 


 Potassium Acetate


 30 meq/Magnesium


 Sulfate 20 meq/


 Calcium Gluconate


 10 meq/


 Multivitamins 10


 ml/Chromium/


 Copper/Manganese/


 Seleni/Zn 0.5 ml/


 Insulin Human


 Regular 30 unit/


 Potassium


 Chloride 30 meq/


 Total Parenteral


 Nutrition/Amino


 Acids/Dextrose/


 Fat Emulsion


 Intravenous  1,920 ml @ 


 80 mls/hr  TPN  CONT  5/1/20 22:00


 5/2/20 21:59 DC 5/1/20 22:34


80 MLS/HR


 


 Potassium Acetate


 40 meq/Magnesium


 Sulfate 5 meq/


 Multivitamins 10


 ml/Chromium/


 Copper/Manganese/


 Seleni/Zn 1 ml/


 Insulin Human


 Regular 30 unit/


 Total Parenteral


 Nutrition/Amino


 Acids/Dextrose/


 Fat Emulsion


 Intravenous  1,920 ml @ 


 80 mls/hr  TPN  CONT  6/8/20 22:00


 6/9/20 21:59  6/8/20 22:28


80 MLS/HR


 


 Potassium Acetate


 55 meq/Magnesium


 Sulfate 20 meq/


 Calcium Gluconate


 10 meq/


 Multivitamins 10


 ml/Chromium/


 Copper/Manganese/


 Seleni/Zn 0.5 ml/


 Insulin Human


 Regular 30 unit/


 Total Parenteral


 Nutrition/Amino


 Acids/Dextrose/


 Fat Emulsion


 Intravenous  1,920 ml @ 


 80 mls/hr  TPN  CONT  4/30/20 22:00


 5/1/20 21:59 DC 5/1/20 01:00


80 MLS/HR


 


 Potassium Acetate


 55 meq/Magnesium


 Sulfate 20 meq/


 Calcium Gluconate


 10 meq/


 Multivitamins 10


 ml/Chromium/


 Copper/Manganese/


 Seleni/Zn 0.5 ml/


 Insulin Human


 Regular 35 unit/


 Total Parenteral


 Nutrition/Amino


 Acids/Dextrose/


 Fat Emulsion


 Intravenous  1,920 ml @ 


 80 mls/hr  TPN  CONT  4/28/20 22:00


 4/29/20 21:59 DC 4/28/20 22:02


80 MLS/HR


 


 Potassium Acetate


 60 meq/Magnesium


 Sulfate 5 meq/


 Multivitamins 10


 ml/Chromium/


 Copper/Manganese/


 Seleni/Zn 1 ml/


 Insulin Human


 Regular 30 unit/


 Total Parenteral


 Nutrition/Amino


 Acids/Dextrose/


 Fat Emulsion


 Intravenous  1,920 ml @ 


 80 mls/hr  TPN  CONT  6/6/20 22:00


 6/7/20 21:59 DC 6/6/20 21:54


80 MLS/HR


 


 Potassium Acetate


 65 meq/Magnesium


 Sulfate 20 meq/


 Calcium Gluconate


 10 meq/


 Multivitamins 10


 ml/Chromium/


 Copper/Manganese/


 Seleni/Zn 0.5 ml/


 Insulin Human


 Regular 30 unit/


 Total Parenteral


 Nutrition/Amino


 Acids/Dextrose/


 Fat Emulsion


 Intravenous  1,920 ml @ 


 80 mls/hr  TPN  CONT  4/29/20 22:00


 4/30/20 21:59 DC 4/29/20 22:22


80 MLS/HR


 


 Potassium Acetate


 80 meq/Magnesium


 Sulfate 5 meq/


 Multivitamins 10


 ml/Chromium/


 Copper/Manganese/


 Seleni/Zn 1 ml/


 Insulin Human


 Regular 20 unit/


 Total Parenteral


 Nutrition/Amino


 Acids/Dextrose/


 Fat Emulsion


 Intravenous  1,920 ml @ 


 80 mls/hr  TPN  CONT  6/5/20 22:00


 6/6/20 21:59 DC 6/5/20 21:59


80 MLS/HR


 


 Prochlorperazine


 Edisylate


  (Compazine)  5 mg  PACU PRN  PRN  4/27/20 07:00


 4/28/20 06:59 DC  





 


 Propofol  20 ml @ As


 Directed  STK-MED ONCE  4/27/20 12:26


 4/27/20 12:27 DC  





 


 Ringer's Solution  1,000 ml @ 


 30 mls/hr  Q24H  4/27/20 07:00


 4/27/20 18:59 DC  





 


 Rocuronium Bromide


  (Zemuron)  50 mg  STK-MED ONCE  4/27/20 10:56


 4/27/20 10:57 DC  





 


 Saliva Substitute


  (Biotene


 Moisturizing


 Mouth)  2 spray  PRN Q15MIN  PRN  5/21/20 11:00


     





 


 Sevoflurane


  (Ultane)  60 ml  STK-MED ONCE  4/27/20 12:26


 4/27/20 12:27 DC  





 


 Sodium


 Bicarbonate 50


 meq/Sodium


 Chloride  1,050 ml @ 


 75 mls/hr  Q14H  3/18/20 07:30


 3/23/20 10:28 DC 3/22/20 21:10


75 MLS/HR


 


 Sodium Acetate 50


 meq/Potassium


 Acetate 55 meq/


 Magnesium Sulfate


 20 meq/Calcium


 Gluconate 10 meq/


 Multivitamins 10


 ml/Chromium/


 Copper/Manganese/


 Seleni/Zn 0.5 ml/


 Insulin Human


 Regular 35 unit/


 Total Parenteral


 Nutrition/Amino


 Acids/Dextrose/


 Fat Emulsion


 Intravenous  1,800 ml @ 


 75 mls/hr  TPN  CONT  4/25/20 22:00


 4/26/20 21:59 DC 4/25/20 22:03


75 MLS/HR


 


 Sodium Bicarbonate


  (Sodium Bicarb


 Adult 8.4% Syr)  50 meq  1X  ONCE  6/7/20 22:00


 6/7/20 22:01 DC 6/7/20 21:47


50 MEQ


 


 Sodium Chloride  500 ml @ 


 500 mls/hr  1X  ONCE  6/9/20 06:45


 6/9/20 07:44 DC 6/9/20 06:39


500 MLS/HR


 


 Sodium Chloride


  (Normal Saline


 Flush)  3 ml  QSHIFT  PRN  4/27/20 13:45


     





 


 Sodium Chloride


 90 meq/Calcium


 Gluconate 10 meq/


 Multivitamins 10


 ml/Chromium/


 Copper/Manganese/


 Seleni/Zn 0.5 ml/


 Total Parenteral


 Nutrition/Amino


 Acids/Dextrose/


 Fat Emulsion


 Intravenous  1,512 ml @ 


 63 mls/hr  TPN  CONT  3/18/20 22:00


 3/19/20 21:59 DC 3/18/20 22:06


63 MLS/HR


 


 Sodium Chloride


 90 meq/Calcium


 Gluconate 10 meq/


 Multivitamins 10


 ml/Chromium/


 Copper/Manganese/


 Seleni/Zn 1 ml/


 Total Parenteral


 Nutrition/Amino


 Acids/Dextrose/


 Fat Emulsion


 Intravenous  55.005 ml


  @ 2.292


 mls/hr  TPN  CONT  3/18/20 22:00


 3/18/20 12:33 DC  





 


 Sodium Chloride


 90 meq/Magnesium


 Sulfate 10 meq/


 Calcium Gluconate


 20 meq/


 Multivitamins 10


 ml/Chromium/


 Copper/Manganese/


 Seleni/Zn 0.5 ml/


 Total Parenteral


 Nutrition/Amino


 Acids/Dextrose/


 Fat Emulsion


 Intravenous  1,512 ml @ 


 63 mls/hr  TPN  CONT  3/19/20 22:00


 3/20/20 21:59 DC 3/19/20 22:25


63 MLS/HR


 


 Sodium Chloride


 90 meq/Magnesium


 Sulfate 12 meq/


 Calcium Gluconate


 15 meq/


 Multivitamins 10


 ml/Chromium/


 Copper/Manganese/


 Seleni/Zn 0.5 ml/


 Insulin Human


 Regular 25 unit/


 Total Parenteral


 Nutrition/Amino


 Acids/Dextrose/


 Fat Emulsion


 Intravenous  1,400 ml @ 


 58.333 mls/


 hr  TPN  CONT  4/8/20 22:00


 4/9/20 21:59 DC 4/8/20 21:41


58.333 MLS/HR


 


 Sodium Chloride


 90 meq/Potassium


 Chloride 15 meq/


 Magnesium Sulfate


 12 meq/Calcium


 Gluconate 15 meq/


 Multivitamins 10


 ml/Chromium/


 Copper/Manganese/


 Seleni/Zn 0.5 ml/


 Insulin Human


 Regular 25 unit/


 Total Parenteral


 Nutrition/Amino


 Acids/Dextrose/


 Fat Emulsion


 Intravenous  1,400 ml @ 


 58.333 mls/


 hr  TPN  CONT  4/7/20 22:00


 4/8/20 21:59 DC 4/7/20 22:13


58.333 MLS/HR


 


 Sodium Chloride


 90 meq/Potassium


 Chloride 15 meq/


 Potassium


 Phosphate 10 mmol/


 Magnesium Sulfate


 8 meq/Calcium


 Gluconate 15 meq/


 Multivitamins 10


 ml/Chromium/


 Copper/Manganese/


 Seleni/Zn 0.5 ml/


 Insulin Human


 Regular 25 unit/


 Total Parenteral


 Nutrition/Amino


 Acids/Dextrose/


 Fat Emulsion


 Intravenous  1,400 ml @ 


 58.333 mls/


 hr  TPN  CONT  4/5/20 22:00


 4/6/20 21:59 DC 4/5/20 21:20


58.333 MLS/HR


 


 Sodium Chloride


 90 meq/Potassium


 Chloride 15 meq/


 Potassium


 Phosphate 10 mmol/


 Magnesium Sulfate


 10 meq/Calcium


 Gluconate 20 meq/


 Multivitamins 10


 ml/Chromium/


 Copper/Manganese/


 Seleni/Zn 0.5 ml/


 Total Parenteral


 Nutrition/Amino


 Acids/Dextrose/


 Fat Emulsion


 Intravenous  1,400 ml @ 


 58.333 mls/


 hr  TPN  CONT  3/23/20 22:00


 3/24/20 21:59 DC 3/23/20 21:42


58.333 MLS/HR


 


 Sodium Chloride


 90 meq/Potassium


 Chloride 15 meq/


 Potassium


 Phosphate 10 mmol/


 Magnesium Sulfate


 12 meq/Calcium


 Gluconate 15 meq/


 Multivitamins 10


 ml/Chromium/


 Copper/Manganese/


 Seleni/Zn 0.5 ml/


 Insulin Human


 Regular 25 unit/


 Total Parenteral


 Nutrition/Amino


 Acids/Dextrose/


 Fat Emulsion


 Intravenous  1,400 ml @ 


 58.333 mls/


 hr  TPN  CONT  4/6/20 22:00


 4/7/20 21:59 DC 4/6/20 22:24


58.333 MLS/HR


 


 Sodium Chloride


 90 meq/Potassium


 Chloride 15 meq/


 Potassium


 Phosphate 15 mmol/


 Magnesium Sulfate


 10 meq/Calcium


 Gluconate 15 meq/


 Multivitamins 10


 ml/Chromium/


 Copper/Manganese/


 Seleni/Zn 0.5 ml/


 Total Parenteral


 Nutrition/Amino


 Acids/Dextrose/


 Fat Emulsion


 Intravenous  1,400 ml @ 


 58.333 mls/


 hr  TPN  CONT  3/24/20 22:00


 3/25/20 21:59 DC 3/24/20 22:17


58.333 MLS/HR


 


 Sodium Chloride


 90 meq/Potassium


 Chloride 15 meq/


 Potassium


 Phosphate 15 mmol/


 Magnesium Sulfate


 10 meq/Calcium


 Gluconate 20 meq/


 Multivitamins 10


 ml/Chromium/


 Copper/Manganese/


 Seleni/Zn 0.5 ml/


 Total Parenteral


 Nutrition/Amino


 Acids/Dextrose/


 Fat Emulsion


 Intravenous  1,200 ml @ 


 50 mls/hr  TPN  CONT  3/22/20 22:00


 3/22/20 14:17 DC  





 


 Sodium Chloride


 90 meq/Potassium


 Chloride 15 meq/


 Potassium


 Phosphate 18 mmol/


 Magnesium Sulfate


 8 meq/Calcium


 Gluconate 15 meq/


 Multivitamins 10


 ml/Chromium/


 Copper/Manganese/


 Seleni/Zn 0.5 ml/


 Insulin Human


 Regular 10 unit/


 Total Parenteral


 Nutrition/Amino


 Acids/Dextrose/


 Fat Emulsion


 Intravenous  1,400 ml @ 


 58.333 mls/


 hr  TPN  CONT  3/27/20 22:00


 3/28/20 21:59 DC 3/27/20 21:43


58.333 MLS/HR


 


 Sodium Chloride


 90 meq/Potassium


 Chloride 15 meq/


 Potassium


 Phosphate 18 mmol/


 Magnesium Sulfate


 8 meq/Calcium


 Gluconate 15 meq/


 Multivitamins 10


 ml/Chromium/


 Copper/Manganese/


 Seleni/Zn 0.5 ml/


 Insulin Human


 Regular 15 unit/


 Total Parenteral


 Nutrition/Amino


 Acids/Dextrose/


 Fat Emulsion


 Intravenous  1,400 ml @ 


 58.333 mls/


 hr  TPN  CONT  3/30/20 22:00


 3/31/20 21:59 DC 3/30/20 21:47


58.333 MLS/HR


 


 Sodium Chloride


 90 meq/Potassium


 Chloride 15 meq/


 Potassium


 Phosphate 18 mmol/


 Magnesium Sulfate


 8 meq/Calcium


 Gluconate 15 meq/


 Multivitamins 10


 ml/Chromium/


 Copper/Manganese/


 Seleni/Zn 0.5 ml/


 Insulin Human


 Regular 20 unit/


 Total Parenteral


 Nutrition/Amino


 Acids/Dextrose/


 Fat Emulsion


 Intravenous  1,400 ml @ 


 58.333 mls/


 hr  TPN  CONT  4/2/20 22:00


 4/3/20 21:59 DC 4/2/20 22:45


58.333 MLS/HR


 


 Sodium Chloride


 90 meq/Potassium


 Chloride 15 meq/


 Potassium


 Phosphate 18 mmol/


 Magnesium Sulfate


 8 meq/Calcium


 Gluconate 15 meq/


 Multivitamins 10


 ml/Chromium/


 Copper/Manganese/


 Seleni/Zn 0.5 ml/


 Total Parenteral


 Nutrition/Amino


 Acids/Dextrose/


 Fat Emulsion


 Intravenous  1,400 ml @ 


 58.333 mls/


 hr  TPN  CONT  3/26/20 22:00


 3/27/20 21:59 DC 3/26/20 22:00


58.333 MLS/HR


 


 Sodium Chloride


 90 meq/Potassium


 Phosphate 15 mmol/


 Magnesium Sulfate


 12 meq/Calcium


 Gluconate 15 meq/


 Multivitamins 10


 ml/Chromium/


 Copper/Manganese/


 Seleni/Zn 0.5 ml/


 Insulin Human


 Regular 30 unit/


 Total Parenteral


 Nutrition/Amino


 Acids/Dextrose/


 Fat Emulsion


 Intravenous  1,400 ml @ 


 58.333 mls/


 hr  TPN  CONT  4/10/20 22:00


 4/11/20 21:59 DC 4/10/20 21:49


58.333 MLS/HR


 


 Sodium Chloride


 90 meq/Potassium


 Phosphate 15 mmol/


 Magnesium Sulfate


 12 meq/Calcium


 Gluconate 15 meq/


 Multivitamins 10


 ml/Chromium/


 Copper/Manganese/


 Seleni/Zn 0.5 ml/


 Insulin Human


 Regular 40 unit/


 Total Parenteral


 Nutrition/Amino


 Acids/Dextrose/


 Fat Emulsion


 Intravenous  1,400 ml @ 


 58.333 mls/


 hr  TPN  CONT  4/11/20 22:00


 4/12/20 21:59 DC 4/11/20 21:21


58.333 MLS/HR


 


 Sodium Chloride


 90 meq/Potassium


 Phosphate 19 mmol/


 Magnesium Sulfate


 12 meq/Calcium


 Gluconate 15 meq/


 Multivitamins 10


 ml/Chromium/


 Copper/Manganese/


 Seleni/Zn 0.5 ml/


 Insulin Human


 Regular 40 unit/


 Total Parenteral


 Nutrition/Amino


 Acids/Dextrose/


 Fat Emulsion


 Intravenous  1,400 ml @ 


 58.333 mls/


 hr  TPN  CONT  4/12/20 22:00


 4/13/20 21:59 DC 4/12/20 21:54


58.333 MLS/HR


 


 Sodium Chloride


 90 meq/Potassium


 Phosphate 5 mmol/


 Magnesium Sulfate


 12 meq/Calcium


 Gluconate 15 meq/


 Multivitamins 10


 ml/Chromium/


 Copper/Manganese/


 Seleni/Zn 0.5 ml/


 Insulin Human


 Regular 30 unit/


 Total Parenteral


 Nutrition/Amino


 Acids/Dextrose/


 Fat Emulsion


 Intravenous  1,400 ml @ 


 58.333 mls/


 hr  TPN  CONT  4/9/20 22:00


 4/10/20 21:59 DC 4/9/20 22:08


58.333 MLS/HR


 


 Sodium Chloride


 100 meq/Potassium


 Chloride 40 meq/


 Magnesium Sulfate


 15 meq/Calcium


 Gluconate 15 meq/


 Multivitamins 10


 ml/Chromium/


 Copper/Manganese/


 Seleni/Zn 0.5 ml/


 Insulin Human


 Regular 35 unit/


 Total Parenteral


 Nutrition/Amino


 Acids/Dextrose/


 Fat Emulsion


 Intravenous  1,400 ml @ 


 58.333 mls/


 hr  TPN  CONT  4/19/20 22:00


 4/20/20 21:59 DC 4/19/20 22:46


58.333 MLS/HR


 


 Sodium Chloride


 100 meq/Potassium


 Chloride 40 meq/


 Magnesium Sulfate


 20 meq/Calcium


 Gluconate 10 meq/


 Multivitamins 10


 ml/Chromium/


 Copper/Manganese/


 Seleni/Zn 0.5 ml/


 Insulin Human


 Regular 35 unit/


 Total Parenteral


 Nutrition/Amino


 Acids/Dextrose/


 Fat Emulsion


 Intravenous  1,400 ml @ 


 58.333 mls/


 hr  TPN  CONT  4/23/20 22:00


 4/24/20 21:59 DC 4/24/20 00:06


58.333 MLS/HR


 


 Sodium Chloride


 100 meq/Potassium


 Chloride 40 meq/


 Magnesium Sulfate


 20 meq/Calcium


 Gluconate 15 meq/


 Multivitamins 10


 ml/Chromium/


 Copper/Manganese/


 Seleni/Zn 0.5 ml/


 Insulin Human


 Regular 35 unit/


 Total Parenteral


 Nutrition/Amino


 Acids/Dextrose/


 Fat Emulsion


 Intravenous  1,400 ml @ 


 58.333 mls/


 hr  TPN  CONT  4/22/20 22:00


 4/23/20 21:59 DC 4/22/20 22:27


58.333 MLS/HR


 


 Sodium Chloride


 100 meq/Potassium


 Phosphate 10 mmol/


 Magnesium Sulfate


 12 meq/Calcium


 Gluconate 15 meq/


 Multivitamins 10


 ml/Chromium/


 Copper/Manganese/


 Seleni/Zn 0.5 ml/


 Insulin Human


 Regular 35 unit/


 Potassium


 Chloride 20 meq/


 Total Parenteral


 Nutrition/Amino


 Acids/Dextrose/


 Fat Emulsion


 Intravenous  1,400 ml @ 


 58.333 mls/


 hr  TPN  CONT  4/16/20 22:00


 4/17/20 21:59 DC 4/16/20 22:10


58.333 MLS/HR


 


 Sodium Chloride


 100 meq/Potassium


 Phosphate 19 mmol/


 Magnesium Sulfate


 12 meq/Calcium


 Gluconate 15 meq/


 Multivitamins 10


 ml/Chromium/


 Copper/Manganese/


 Seleni/Zn 0.5 ml/


 Insulin Human


 Regular 40 unit/


 Potassium


 Chloride 20 meq/


 Total Parenteral


 Nutrition/Amino


 Acids/Dextrose/


 Fat Emulsion


 Intravenous  1,400 ml @ 


 58.333 mls/


 hr  TPN  CONT  4/15/20 22:00


 4/16/20 21:59 DC 4/15/20 21:20


58.333 MLS/HR


 


 Sodium Chloride


 100 meq/Potassium


 Phosphate 5 mmol/


 Magnesium Sulfate


 12 meq/Calcium


 Gluconate 15 meq/


 Multivitamins 10


 ml/Chromium/


 Copper/Manganese/


 Seleni/Zn 0.5 ml/


 Insulin Human


 Regular 35 unit/


 Potassium


 Chloride 20 meq/


 Total Parenteral


 Nutrition/Amino


 Acids/Dextrose/


 Fat Emulsion


 Intravenous  1,400 ml @ 


 58.333 mls/


 hr  TPN  CONT  4/17/20 22:00


 4/18/20 21:59 DC 4/17/20 22:59


58.333 MLS/HR


 


 Succinylcholine


 Chloride


  (Anectine)  120 mg  1X  ONCE  3/23/20 08:30


 3/23/20 08:31 DC 3/23/20 08:34


120 MG


 


 Vecuronium Bromide


  (Norcuron Bolus)  6 mg  PRN Q6HRS  PRN  5/7/20 19:15


 5/7/20 19:35 DC  














Labs:


Lab





Laboratory Tests








Test


 6/8/20


08:15 6/8/20


11:00 6/8/20


11:55 6/8/20


18:25


 


O2 Saturation 98 % (92-99)    


 


Arterial Blood pH


 7.34


(7.35-7.45) 


 


 





 


Arterial Blood pCO2 at


Patient Temp 40 mmHg


(35-46) 


 


 





 


Arterial Blood pO2 at Patient


Temp 126 mmHg


() 


 


 





 


Arterial Blood HCO3


 21 mmol/L


(21-28) 


 


 





 


Arterial Blood Base Excess


 -5 mmol/L


(-3-3) 


 


 





 


FiO2 40% bipap    


 


Stool Occult Blood  Negative (NEG)   


 


White Blood Count


 


 


 11.8 x10^3/uL


(4.0-11.0) 





 


Red Blood Count


 


 


 2.93 x10^6/uL


(3.50-5.40) 





 


Hemoglobin


 


 


 8.6 g/dL


(12.0-15.5) 





 


Hematocrit


 


 


 26.0 %


(36.0-47.0) 





 


Mean Corpuscular Volume   89 fL ()  


 


Mean Corpuscular Hemoglobin   29 pg (25-35)  


 


Mean Corpuscular Hemoglobin


Concent 


 


 33 g/dL


(31-37) 





 


Red Cell Distribution Width


 


 


 16.8 %


(11.5-14.5) 





 


Platelet Count


 


 


 292 x10^3/uL


(140-400) 





 


Glucose (Fingerstick)


 


 


 203 mg/dL


(70-99) 190 mg/dL


(70-99)


 


Test


 6/9/20


00:16 6/9/20


04:00 6/9/20


05:24 6/9/20


06:22


 


Glucose (Fingerstick)


 126 mg/dL


(70-99) 


 


 131 mg/dL


(70-99)


 


White Blood Count


 


 8.0 x10^3/uL


(4.0-11.0) 


 





 


Red Blood Count


 


 2.92 x10^6/uL


(3.50-5.40) 


 





 


Hemoglobin


 


 8.6 g/dL


(12.0-15.5) 


 





 


Hematocrit


 


 26.2 %


(36.0-47.0) 


 





 


Mean Corpuscular Volume  90 fL ()   


 


Mean Corpuscular Hemoglobin  30 pg (25-35)   


 


Mean Corpuscular Hemoglobin


Concent 


 33 g/dL


(31-37) 


 





 


Red Cell Distribution Width


 


 17.4 %


(11.5-14.5) 


 





 


Platelet Count


 


 285 x10^3/uL


(140-400) 


 





 


Neutrophils (%) (Auto)  77 % (31-73)   


 


Lymphocytes (%) (Auto)  16 % (24-48)   


 


Monocytes (%) (Auto)  5 % (0-9)   


 


Eosinophils (%) (Auto)  2 % (0-3)   


 


Basophils (%) (Auto)  0 % (0-3)   


 


Neutrophils # (Auto)


 


 6.1 x10^3/uL


(1.8-7.7) 


 





 


Lymphocytes # (Auto)


 


 1.3 x10^3/uL


(1.0-4.8) 


 





 


Monocytes # (Auto)


 


 0.4 x10^3/uL


(0.0-1.1) 


 





 


Eosinophils # (Auto)


 


 0.2 x10^3/uL


(0.0-0.7) 


 





 


Basophils # (Auto)


 


 0.0 x10^3/uL


(0.0-0.2) 


 





 


Sodium Level


 


 150 mmol/L


(136-145) 


 





 


Potassium Level


 


 4.2 mmol/L


(3.5-5.1) 


 





 


Chloride Level


 


 118 mmol/L


() 


 





 


Carbon Dioxide Level


 


 23 mmol/L


(21-32) 


 





 


Anion Gap  9 (6-14)   


 


Blood Urea Nitrogen


 


 48 mg/dL


(7-20) 


 





 


Creatinine


 


 1.1 mg/dL


(0.6-1.0) 


 





 


Estimated GFR


(Cockcroft-Gault) 


 52.8 


 


 





 


Glucose Level


 


 165 mg/dL


(70-99) 


 





 


Calcium Level


 


 10.3 mg/dL


(8.5-10.1) 


 





 


O2 Saturation   90 % (92-99)  


 


Arterial Blood pH


 


 


 7.36


(7.35-7.45) 





 


Arterial Blood pCO2 at


Patient Temp 


 


 36 mmHg


(35-46) 





 


Arterial Blood pO2 at Patient


Temp 


 


 63 mmHg


() 





 


Arterial Blood HCO3


 


 


 20 mmol/L


(21-28) 





 


Arterial Blood Base Excess


 


 


 -5 mmol/L


(-3-3) 





 


FiO2   33  











Objective:


Assessment:


Fever intermittent could be from underlying pancreatitis vs  aspiration 

pneumonia


Acute pancreatitis with persistent  necrosis


CT a/p 4/9


    Increased ascites. Persistent evidence of necrotizing pancreatitis with 

fluid and phlegmon


   at the pancreas


   4/27 status post ROBERT drain placement; yeast


   5/6 fluid  candida parapsilosis fluid amylase high


CT 6/7


IMPRESSION:


1.   Sequela of pancreatitis with extensive pseudocysts again 


demonstrated, the right-sided collections are slightly larger since the 


prior exam, the left-sided collections are stable. 


6/7 fluid cult noted





Cholelithiasis with thickening of the gallbladder wall.


Leucocytosis 


JED,Hyperkalemia, Metabolic acidosis off dialysis


Acute hypoxic resp failure ,bilateral pleural effusion and atelectasis


hypocalcemia 


Prediabetes


HTN


s/p trach





Plan:


Plan of Care











 Continue meropenem, dose adjusted for renal function 


cont  micafungin /dapto ( 6/7)


Follow-up cultures and lab incluing fluid c/s GNR,yeast


General surgery following


Monitor drain output


Maintain aspiration precaution


Supportive care


Prognosis poor


Critically ill





D/w nursing











IVAN FRANZ MD            Jun 9, 2020 08:04

## 2020-06-10 VITALS — DIASTOLIC BLOOD PRESSURE: 64 MMHG | SYSTOLIC BLOOD PRESSURE: 129 MMHG

## 2020-06-10 VITALS — SYSTOLIC BLOOD PRESSURE: 169 MMHG | DIASTOLIC BLOOD PRESSURE: 78 MMHG

## 2020-06-10 VITALS — SYSTOLIC BLOOD PRESSURE: 152 MMHG | DIASTOLIC BLOOD PRESSURE: 78 MMHG

## 2020-06-10 VITALS — SYSTOLIC BLOOD PRESSURE: 141 MMHG | DIASTOLIC BLOOD PRESSURE: 76 MMHG

## 2020-06-10 VITALS — SYSTOLIC BLOOD PRESSURE: 138 MMHG | DIASTOLIC BLOOD PRESSURE: 78 MMHG

## 2020-06-10 VITALS — SYSTOLIC BLOOD PRESSURE: 172 MMHG | DIASTOLIC BLOOD PRESSURE: 79 MMHG

## 2020-06-10 VITALS — SYSTOLIC BLOOD PRESSURE: 165 MMHG | DIASTOLIC BLOOD PRESSURE: 85 MMHG

## 2020-06-10 VITALS — DIASTOLIC BLOOD PRESSURE: 72 MMHG | SYSTOLIC BLOOD PRESSURE: 129 MMHG

## 2020-06-10 VITALS — SYSTOLIC BLOOD PRESSURE: 138 MMHG | DIASTOLIC BLOOD PRESSURE: 68 MMHG

## 2020-06-10 VITALS — DIASTOLIC BLOOD PRESSURE: 85 MMHG | SYSTOLIC BLOOD PRESSURE: 168 MMHG

## 2020-06-10 VITALS — DIASTOLIC BLOOD PRESSURE: 64 MMHG | SYSTOLIC BLOOD PRESSURE: 147 MMHG

## 2020-06-10 VITALS — DIASTOLIC BLOOD PRESSURE: 79 MMHG | SYSTOLIC BLOOD PRESSURE: 169 MMHG

## 2020-06-10 VITALS — SYSTOLIC BLOOD PRESSURE: 139 MMHG | DIASTOLIC BLOOD PRESSURE: 76 MMHG

## 2020-06-10 VITALS — DIASTOLIC BLOOD PRESSURE: 72 MMHG | SYSTOLIC BLOOD PRESSURE: 134 MMHG

## 2020-06-10 VITALS — DIASTOLIC BLOOD PRESSURE: 78 MMHG | SYSTOLIC BLOOD PRESSURE: 148 MMHG

## 2020-06-10 VITALS — DIASTOLIC BLOOD PRESSURE: 75 MMHG | SYSTOLIC BLOOD PRESSURE: 127 MMHG

## 2020-06-10 VITALS — DIASTOLIC BLOOD PRESSURE: 91 MMHG | SYSTOLIC BLOOD PRESSURE: 207 MMHG

## 2020-06-10 VITALS — SYSTOLIC BLOOD PRESSURE: 130 MMHG | DIASTOLIC BLOOD PRESSURE: 70 MMHG

## 2020-06-10 VITALS — SYSTOLIC BLOOD PRESSURE: 164 MMHG | DIASTOLIC BLOOD PRESSURE: 75 MMHG

## 2020-06-10 VITALS — DIASTOLIC BLOOD PRESSURE: 74 MMHG | SYSTOLIC BLOOD PRESSURE: 134 MMHG

## 2020-06-10 VITALS — SYSTOLIC BLOOD PRESSURE: 129 MMHG | DIASTOLIC BLOOD PRESSURE: 64 MMHG

## 2020-06-10 VITALS — SYSTOLIC BLOOD PRESSURE: 153 MMHG | DIASTOLIC BLOOD PRESSURE: 74 MMHG

## 2020-06-10 VITALS — DIASTOLIC BLOOD PRESSURE: 65 MMHG | SYSTOLIC BLOOD PRESSURE: 129 MMHG

## 2020-06-10 VITALS — SYSTOLIC BLOOD PRESSURE: 158 MMHG | DIASTOLIC BLOOD PRESSURE: 82 MMHG

## 2020-06-10 VITALS — DIASTOLIC BLOOD PRESSURE: 86 MMHG | SYSTOLIC BLOOD PRESSURE: 170 MMHG

## 2020-06-10 LAB
ANION GAP SERPL CALC-SCNC: 7 MMOL/L (ref 6–14)
BASOPHILS # BLD AUTO: 0 X10^3/UL (ref 0–0.2)
BASOPHILS NFR BLD: 0 % (ref 0–3)
BUN SERPL-MCNC: 38 MG/DL (ref 7–20)
CALCIUM SERPL-MCNC: 10 MG/DL (ref 8.5–10.1)
CHLORIDE SERPL-SCNC: 117 MMOL/L (ref 98–107)
CO2 SERPL-SCNC: 23 MMOL/L (ref 21–32)
CREAT SERPL-MCNC: 1 MG/DL (ref 0.6–1)
EOSINOPHIL NFR BLD: 0.1 X10^3/UL (ref 0–0.7)
EOSINOPHIL NFR BLD: 2 % (ref 0–3)
ERYTHROCYTE [DISTWIDTH] IN BLOOD BY AUTOMATED COUNT: 18 % (ref 11.5–14.5)
GFR SERPLBLD BASED ON 1.73 SQ M-ARVRAT: 58.9 ML/MIN
GLUCOSE SERPL-MCNC: 164 MG/DL (ref 70–99)
HCT VFR BLD CALC: 24 % (ref 36–47)
HGB BLD-MCNC: 7.8 G/DL (ref 12–15.5)
LYMPHOCYTES # BLD: 1.3 X10^3/UL (ref 1–4.8)
LYMPHOCYTES NFR BLD AUTO: 20 % (ref 24–48)
MCH RBC QN AUTO: 29 PG (ref 25–35)
MCHC RBC AUTO-ENTMCNC: 33 G/DL (ref 31–37)
MCV RBC AUTO: 90 FL (ref 79–100)
MONO #: 0.5 X10^3/UL (ref 0–1.1)
MONOCYTES NFR BLD: 7 % (ref 0–9)
NEUT #: 4.8 X10^3/UL (ref 1.8–7.7)
NEUTROPHILS NFR BLD AUTO: 71 % (ref 31–73)
PLATELET # BLD AUTO: 279 X10^3/UL (ref 140–400)
POTASSIUM SERPL-SCNC: 3.9 MMOL/L (ref 3.5–5.1)
RBC # BLD AUTO: 2.67 X10^6/UL (ref 3.5–5.4)
SODIUM SERPL-SCNC: 147 MMOL/L (ref 136–145)
WBC # BLD AUTO: 6.8 X10^3/UL (ref 4–11)

## 2020-06-10 RX ADMIN — INSULIN LISPRO SCH UNITS: 100 INJECTION, SOLUTION INTRAVENOUS; SUBCUTANEOUS at 00:00

## 2020-06-10 RX ADMIN — INSULIN LISPRO SCH UNITS: 100 INJECTION, SOLUTION INTRAVENOUS; SUBCUTANEOUS at 05:56

## 2020-06-10 RX ADMIN — ONDANSETRON PRN MG: 2 INJECTION INTRAMUSCULAR; INTRAVENOUS at 15:34

## 2020-06-10 RX ADMIN — DILTIAZEM HYDROCHLORIDE SCH MLS/HR: 5 INJECTION INTRAVENOUS at 12:09

## 2020-06-10 RX ADMIN — BACITRACIN SCH MLS/HR: 5000 INJECTION, POWDER, FOR SOLUTION INTRAMUSCULAR at 05:11

## 2020-06-10 RX ADMIN — INSULIN LISPRO SCH UNITS: 100 INJECTION, SOLUTION INTRAVENOUS; SUBCUTANEOUS at 18:00

## 2020-06-10 RX ADMIN — ACETAMINOPHEN PRN MG: 650 SUPPOSITORY RECTAL at 22:16

## 2020-06-10 RX ADMIN — FENTANYL CITRATE PRN MCG: 50 INJECTION INTRAMUSCULAR; INTRAVENOUS at 02:06

## 2020-06-10 RX ADMIN — PROCHLORPERAZINE EDISYLATE PRN MG: 5 INJECTION INTRAMUSCULAR; INTRAVENOUS at 14:33

## 2020-06-10 RX ADMIN — PANTOPRAZOLE SODIUM SCH MG: 40 INJECTION, POWDER, FOR SOLUTION INTRAVENOUS at 08:17

## 2020-06-10 RX ADMIN — FENTANYL CITRATE PRN MCG: 50 INJECTION INTRAMUSCULAR; INTRAVENOUS at 22:51

## 2020-06-10 RX ADMIN — FENTANYL CITRATE PRN MCG: 50 INJECTION INTRAMUSCULAR; INTRAVENOUS at 05:58

## 2020-06-10 RX ADMIN — Medication PRN EACH: at 10:39

## 2020-06-10 RX ADMIN — FENTANYL CITRATE PRN MCG: 50 INJECTION INTRAMUSCULAR; INTRAVENOUS at 16:25

## 2020-06-10 RX ADMIN — INSULIN LISPRO SCH UNITS: 100 INJECTION, SOLUTION INTRAVENOUS; SUBCUTANEOUS at 12:00

## 2020-06-10 RX ADMIN — DILTIAZEM HYDROCHLORIDE SCH MLS/HR: 5 INJECTION INTRAVENOUS at 08:16

## 2020-06-10 RX ADMIN — DILTIAZEM HYDROCHLORIDE SCH MLS/HR: 5 INJECTION INTRAVENOUS at 21:26

## 2020-06-10 RX ADMIN — FENTANYL CITRATE PRN MCG: 50 INJECTION INTRAMUSCULAR; INTRAVENOUS at 10:23

## 2020-06-10 RX ADMIN — FENTANYL SCH PATCH: 50 PATCH, EXTENDED RELEASE TRANSDERMAL at 21:00

## 2020-06-10 NOTE — PDOC
PROGRESS NOTES


Chief Complaint


Chief Complaint


A/P:


Acute hypoxic Respiratory failure requiring mechanical ventilation (now 

extubated for several days but still with tracheostomy)


Tracheostomy


bilateral pleural effusions/pulm edema 


Sepsis


Severe Acute gallstone pancreatitis (not a surgical candidate at this time) with

necrosis


Acute kidney failure now requiring dialysis


Salpingitis


Gallstones (Calculus of gallbladder with acute cholecystitis without 

obstruction)


HTN 


Leukocytosis 


Hypoxia


Uterine fibroid


Intractable pain


Intractable nausea


Covid 19 negative. 


Acute on chronic anemia 


EEG: No seizure activityFever  - better currently - intermittent could be from 

underlying pancreatitis blood cults 5/4 - neg so far


? Ileus with vomiting


Abd distention - U/S and CT reviewed s/p 0.4 L of opaque, debris-containing 

ascites was removed 5/6


Acute pancreatitis with persistent necrosis


- 4/27 status post ROBERT drain placement + C paropsilosis. s/p additional drains 5 /8


Anemia - S/p PRBCs


Cholelithiasis with thickening of the gallbladder wall.


Leucocytosis improving


JED, hyperkalemia, Metabolic acidosis off dialysis


Acute hypoxic resp failure ,bilateral pleural effusion and atelectasis


hypocalcemia 


Prediabetes


HTN


s/p trach


ESRD on HD


Hyperglycemia





FEN - 


PPX - SCDs, off lovenox currently


FULL CODE


Dispo - ICU, critically ill


CC time 49 minutes





History of Present Illness


History of Present Illness


6/8: IR placed drain on 6/7. 4u PRBC after Hb drop. Hb 8.8 today. Off Levophed 

this morning. T-max 100.3. Much more lethargic today. CXR with left sided dif

fuse infiltrates.


6/9: Tachycardic overnight into the 140s. NGT clamped. On BIPAP currently. 

Drains with serosanguinous discharge. WBC 8, Tmax 99.6F.





Seen on trach shield in ICU.  Hypertensive and tachycardic. Labs stable. blood 

stained drainage from drains. Afebrile.





6/5/2020


Patient seen and examined on telemetry floor today


This morning I had a couple of discussions with case management


The issue is the patient will not wear her trach cap


Without that she cannot advance her diet and is currently supposed to be on 

honey thick liquids


I was going to talk to the patient about wearing her trach But when I arrived to

the room she was lying on the floor with several nurses and therapist around


Apparently she did walk to the end of the garsia then back and then collapsed onto

the floor


She had a bowel movement when this all happened


Appears to be critically ill again


Very shaky tremulous anxious has a stare in her eyes


We got her back in bed


I am extremely concerned about her long-term prognosis again











6/4/2020


Patient seen and examined in the ICU


She remains critically ill is is extremely weak


Chart reviewed


Discussed with RN


We decided to try some Prozac as she does seem depressed


On IV TPN


Still has NG tube








6/3/2020


Patient seen and examined in the ICU


She remains critically ill


We have been trying to hold off on her Ativan for the past 24 hours


She is a little more awake but shaky and agitated and anxious seems depressed


Discussed with RN


Chart reviewed








6/2/2020


Patient seen and examined in the ICU


She is a little more alert today but still quite ill


Appears clammy and pale and depressed/anxious


Discussed with RN


Chart reviewed











6/1/2020


Patient seen and examined in the ICU


She appears extremely ill


She is tachypneic at 35 respirations per minute and tachycardic at 132 bpm


She is extremely encephalopathic and shaky


She appears clammy


Chart reviewed


Discussed with RN


Prognosis extremely guarded at best








5/31/2020


Patient still in ICU


Resting with no apparent distress


Chart reviewed








5/30/2020


Patient seen and examined in the ICU


She is wiping her face with a cough


Discussed with RN


Chart reviewed


We hope to get her out of the ICU later today if possible








5/29/2020


Patient seen and examined in the ICU once again


She is back on NG suction


On IV Zosyn


Has IV TPN


Sedated with Precedex but anxious still


Appears somewhat clammy and pale


Chart reviewed


Discussed with RN


She remains critically ill














5/28/2020


Patient seen and examined in the ICU


She has an NG that is clamped we are hoping to start some clear liquids but she 

looks quite ill


She is on IV TPN


Meropenem changed to IV Zosyn (agree)


She has [place] to bedside drainage


She is semi-sedated with Precedex


Chart reviewed


Discussed with RN


She remains critically ill











Seen bedside. Hb 8. She was just a bit hypoxic, had a mucous plug suctioned. 

Able to vocalize well with speaking valve, tells me we are being very hard on he

r and she would like to be drugged back to sleep.  She wants to wake up and feel

better. On trach shield, 


T max 100.4. afebrile this a.m.





5/26/2020


Patient seen and examined in the ICU


She is up in the chair very frail trying to talk a little shaky


Discussed with RN


Chart reviewed








5/25/2020


Patient seen and examined in the ICU


Patient up in the chair


Having a severe coughing episode with a lot of phlegm coming out of her 

tracheostomy


Discussed with RN


Discussed with physical therapy


Chart reviewed











5/24,.    feels well,  has been out of room in wheelchair


no complaint,    still weak,   some with not wanting to wear her valve on trach


cont other,   may be able to work with speech tomorrow,    videoswallow OK to 

try, 








5/23,  anxiety is up today,   she dislikes the valve still,    


shower and outside today


.   





5/22 she doesnt want to wear her passy-kristan valve,  discussed str and plan with 

her.


speech following,  needs swallow study,   but needs to wear her valve longer,  


cont current


able to walk some, walker 





5/21  stronger,   better,   


we discussed better oral care


she would like to try swallow study,  wants to try to eat,    speech is 

following








5/20/2020 


She remains in the ICU


sitting up and working with OT,   getting better


if limit pain meds, may do better off the vent, 





Nurses trying to suction her,  that is also improved, 


Chart reviewed


 








5/19/2020


Patient seen and examined in the ICU


She had an episode yesterday of tachycardia and severe agitation we gave her 

some Ativan


After that she seemed to have stroke symptoms but now that the Ativan has wore 

off her stroke symptoms have resolved


 


 


She is on IV meropenem and daptomycin and micafungin


Chart reviewed


Discussed with RN


Patient is still critically ill


 


BRIEF OPERATIVE NOTE


Pre-Op Diagnosis


Pancreatitis with pseudocysts, suspected infection


Post-Op Diagnosis


same


Procedure Performed


CT abdominal Drains x 3


Surgeon


Hardy


Anesthesia Type:  Conscious Sedation


Findings


3 abdominal drains, 14F, with turbid pancreatic fluid and necrotic debris in 

each.


Complications


No immediate








5/9: Patient today somewhat restless and having bilious secretions from ET tube,

imaging studies ordered, discussed with consultant. Pretty poor prognosis, hopef

jade is not a fistula, poor surgical candidate. 





5/10: Imaging with no acute events, she seems more stable today compared to ye sterday. Encouraged as much activity as possible patient at high risk for severe

depression.





Vitals


Vitals





Vital Signs








  Date Time  Temp Pulse Resp B/P (MAP) Pulse Ox O2 Delivery O2 Flow Rate FiO2


 


6/10/20 07:00  126 28 169/78 (108) 100 Tracheal Collar 10.0 


 


6/10/20 04:00 97.8       





 97.8       











Physical Exam


Physical Exam


GENERAL:lethargic, arousable though extremely weak


HEENT: NGT in place. Oral mucosa dry


NECK:  Tracheostomy 


LUNGS: Diminished aeration bases, no accessory muscle use 


HEART:  S1, S2, tachy, regular 


ABDOMEN: Mild distention, bowel sounds present, soft, grimaces to palpation 


Right lateral side drainage bag, 3 drains with bloodstained drainage


: [place] ( 6/ 7)


EXTREMITIES: Trace edema .no  cyanosis 


SKIN: no signs of gen rash 


CNS: Lethargic 


LUE-PICC (5/29) without signs of complications


General:  No acute distress


Heart:  Regular rate, Normal S1, Normal S2, No murmurs, Gallops


Lungs:  Other (diminshed in bases, Rhonci in LLL)


Abdomen:  Soft, No tenderness, Other (drain c/w dark blood)


Extremities:  No clubbing, No cyanosis, No edema, Normal pulses, No 

tenderness/swelling


Skin:  Other (warm, dry)





Labs


LABS





Laboratory Tests








Test


 6/9/20


12:16 6/9/20


18:14 6/9/20


23:54 6/10/20


05:50


 


Glucose (Fingerstick)


 140 mg/dL


(70-99) 158 mg/dL


(70-99) 116 mg/dL


(70-99) 





 


White Blood Count


 


 


 


 6.8 x10^3/uL


(4.0-11.0)


 


Red Blood Count


 


 


 


 2.67 x10^6/uL


(3.50-5.40)


 


Hemoglobin


 


 


 


 7.8 g/dL


(12.0-15.5)


 


Hematocrit


 


 


 


 24.0 %


(36.0-47.0)


 


Mean Corpuscular Volume    90 fL () 


 


Mean Corpuscular Hemoglobin    29 pg (25-35) 


 


Mean Corpuscular Hemoglobin


Concent 


 


 


 33 g/dL


(31-37)


 


Red Cell Distribution Width


 


 


 


 18.0 %


(11.5-14.5)


 


Platelet Count


 


 


 


 279 x10^3/uL


(140-400)


 


Neutrophils (%) (Auto)    71 % (31-73) 


 


Lymphocytes (%) (Auto)    20 % (24-48) 


 


Monocytes (%) (Auto)    7 % (0-9) 


 


Eosinophils (%) (Auto)    2 % (0-3) 


 


Basophils (%) (Auto)    0 % (0-3) 


 


Neutrophils # (Auto)


 


 


 


 4.8 x10^3/uL


(1.8-7.7)


 


Lymphocytes # (Auto)


 


 


 


 1.3 x10^3/uL


(1.0-4.8)


 


Monocytes # (Auto)


 


 


 


 0.5 x10^3/uL


(0.0-1.1)


 


Eosinophils # (Auto)


 


 


 


 0.1 x10^3/uL


(0.0-0.7)


 


Basophils # (Auto)


 


 


 


 0.0 x10^3/uL


(0.0-0.2)


 


Sodium Level


 


 


 


 147 mmol/L


(136-145)


 


Potassium Level


 


 


 


 3.9 mmol/L


(3.5-5.1)


 


Chloride Level


 


 


 


 117 mmol/L


()


 


Carbon Dioxide Level


 


 


 


 23 mmol/L


(21-32)


 


Anion Gap    7 (6-14) 


 


Blood Urea Nitrogen


 


 


 


 38 mg/dL


(7-20)


 


Creatinine


 


 


 


 1.0 mg/dL


(0.6-1.0)


 


Estimated GFR


(Cockcroft-Gault) 


 


 


 58.9 





 


Glucose Level


 


 


 


 164 mg/dL


(70-99)


 


Calcium Level


 


 


 


 10.0 mg/dL


(8.5-10.1)


 


Test


 6/10/20


05:54 


 


 





 


Glucose (Fingerstick)


 152 mg/dL


(70-99) 


 


 














Assessment and Plan


Assessmemt and Plan


Problems


Medical Problems:


(1) Acute pancreatitis


Status: Acute  





(2) Cholelithiasis


Status: Acute  











Comment


Review of Relevant


I have reviewed the following items josy (where applicable) has been applied.


Labs





Laboratory Tests








Test


 6/8/20


08:15 6/8/20


11:00 6/8/20


11:55 6/8/20


18:25


 


O2 Saturation 98 % (92-99)    


 


Arterial Blood pH


 7.34


(7.35-7.45) 


 


 





 


Arterial Blood pCO2 at


Patient Temp 40 mmHg


(35-46) 


 


 





 


Arterial Blood pO2 at Patient


Temp 126 mmHg


() 


 


 





 


Arterial Blood HCO3


 21 mmol/L


(21-28) 


 


 





 


Arterial Blood Base Excess


 -5 mmol/L


(-3-3) 


 


 





 


FiO2 40% bipap    


 


Stool Occult Blood  Negative (NEG)   


 


White Blood Count


 


 


 11.8 x10^3/uL


(4.0-11.0) 





 


Red Blood Count


 


 


 2.93 x10^6/uL


(3.50-5.40) 





 


Hemoglobin


 


 


 8.6 g/dL


(12.0-15.5) 





 


Hematocrit


 


 


 26.0 %


(36.0-47.0) 





 


Mean Corpuscular Volume   89 fL ()  


 


Mean Corpuscular Hemoglobin   29 pg (25-35)  


 


Mean Corpuscular Hemoglobin


Concent 


 


 33 g/dL


(31-37) 





 


Red Cell Distribution Width


 


 


 16.8 %


(11.5-14.5) 





 


Platelet Count


 


 


 292 x10^3/uL


(140-400) 





 


Glucose (Fingerstick)


 


 


 203 mg/dL


(70-99) 190 mg/dL


(70-99)


 


Test


 6/9/20


00:16 6/9/20


04:00 6/9/20


05:24 6/9/20


06:22


 


Glucose (Fingerstick)


 126 mg/dL


(70-99) 


 


 131 mg/dL


(70-99)


 


White Blood Count


 


 8.0 x10^3/uL


(4.0-11.0) 


 





 


Red Blood Count


 


 2.92 x10^6/uL


(3.50-5.40) 


 





 


Hemoglobin


 


 8.6 g/dL


(12.0-15.5) 


 





 


Hematocrit


 


 26.2 %


(36.0-47.0) 


 





 


Mean Corpuscular Volume  90 fL ()   


 


Mean Corpuscular Hemoglobin  30 pg (25-35)   


 


Mean Corpuscular Hemoglobin


Concent 


 33 g/dL


(31-37) 


 





 


Red Cell Distribution Width


 


 17.4 %


(11.5-14.5) 


 





 


Platelet Count


 


 285 x10^3/uL


(140-400) 


 





 


Neutrophils (%) (Auto)  77 % (31-73)   


 


Lymphocytes (%) (Auto)  16 % (24-48)   


 


Monocytes (%) (Auto)  5 % (0-9)   


 


Eosinophils (%) (Auto)  2 % (0-3)   


 


Basophils (%) (Auto)  0 % (0-3)   


 


Neutrophils # (Auto)


 


 6.1 x10^3/uL


(1.8-7.7) 


 





 


Lymphocytes # (Auto)


 


 1.3 x10^3/uL


(1.0-4.8) 


 





 


Monocytes # (Auto)


 


 0.4 x10^3/uL


(0.0-1.1) 


 





 


Eosinophils # (Auto)


 


 0.2 x10^3/uL


(0.0-0.7) 


 





 


Basophils # (Auto)


 


 0.0 x10^3/uL


(0.0-0.2) 


 





 


Sodium Level


 


 150 mmol/L


(136-145) 


 





 


Potassium Level


 


 4.2 mmol/L


(3.5-5.1) 


 





 


Chloride Level


 


 118 mmol/L


() 


 





 


Carbon Dioxide Level


 


 23 mmol/L


(21-32) 


 





 


Anion Gap  9 (6-14)   


 


Blood Urea Nitrogen


 


 48 mg/dL


(7-20) 


 





 


Creatinine


 


 1.1 mg/dL


(0.6-1.0) 


 





 


Estimated GFR


(Cockcroft-Gault) 


 52.8 


 


 





 


Glucose Level


 


 165 mg/dL


(70-99) 


 





 


Calcium Level


 


 10.3 mg/dL


(8.5-10.1) 


 





 


O2 Saturation   90 % (92-99)  


 


Arterial Blood pH


 


 


 7.36


(7.35-7.45) 





 


Arterial Blood pCO2 at


Patient Temp 


 


 36 mmHg


(35-46) 





 


Arterial Blood pO2 at Patient


Temp 


 


 63 mmHg


() 





 


Arterial Blood HCO3


 


 


 20 mmol/L


(21-28) 





 


Arterial Blood Base Excess


 


 


 -5 mmol/L


(-3-3) 





 


FiO2   33  


 


Test


 6/9/20


12:16 6/9/20


18:14 6/9/20


23:54 6/10/20


05:50


 


Glucose (Fingerstick)


 140 mg/dL


(70-99) 158 mg/dL


(70-99) 116 mg/dL


(70-99) 





 


White Blood Count


 


 


 


 6.8 x10^3/uL


(4.0-11.0)


 


Red Blood Count


 


 


 


 2.67 x10^6/uL


(3.50-5.40)


 


Hemoglobin


 


 


 


 7.8 g/dL


(12.0-15.5)


 


Hematocrit


 


 


 


 24.0 %


(36.0-47.0)


 


Mean Corpuscular Volume    90 fL () 


 


Mean Corpuscular Hemoglobin    29 pg (25-35) 


 


Mean Corpuscular Hemoglobin


Concent 


 


 


 33 g/dL


(31-37)


 


Red Cell Distribution Width


 


 


 


 18.0 %


(11.5-14.5)


 


Platelet Count


 


 


 


 279 x10^3/uL


(140-400)


 


Neutrophils (%) (Auto)    71 % (31-73) 


 


Lymphocytes (%) (Auto)    20 % (24-48) 


 


Monocytes (%) (Auto)    7 % (0-9) 


 


Eosinophils (%) (Auto)    2 % (0-3) 


 


Basophils (%) (Auto)    0 % (0-3) 


 


Neutrophils # (Auto)


 


 


 


 4.8 x10^3/uL


(1.8-7.7)


 


Lymphocytes # (Auto)


 


 


 


 1.3 x10^3/uL


(1.0-4.8)


 


Monocytes # (Auto)


 


 


 


 0.5 x10^3/uL


(0.0-1.1)


 


Eosinophils # (Auto)


 


 


 


 0.1 x10^3/uL


(0.0-0.7)


 


Basophils # (Auto)


 


 


 


 0.0 x10^3/uL


(0.0-0.2)


 


Sodium Level


 


 


 


 147 mmol/L


(136-145)


 


Potassium Level


 


 


 


 3.9 mmol/L


(3.5-5.1)


 


Chloride Level


 


 


 


 117 mmol/L


()


 


Carbon Dioxide Level


 


 


 


 23 mmol/L


(21-32)


 


Anion Gap    7 (6-14) 


 


Blood Urea Nitrogen


 


 


 


 38 mg/dL


(7-20)


 


Creatinine


 


 


 


 1.0 mg/dL


(0.6-1.0)


 


Estimated GFR


(Cockcroft-Gault) 


 


 


 58.9 





 


Glucose Level


 


 


 


 164 mg/dL


(70-99)


 


Calcium Level


 


 


 


 10.0 mg/dL


(8.5-10.1)


 


Test


 6/10/20


05:54 


 


 





 


Glucose (Fingerstick)


 152 mg/dL


(70-99) 


 


 











Laboratory Tests








Test


 6/9/20


12:16 6/9/20


18:14 6/9/20


23:54 6/10/20


05:50


 


Glucose (Fingerstick)


 140 mg/dL


(70-99) 158 mg/dL


(70-99) 116 mg/dL


(70-99) 





 


White Blood Count


 


 


 


 6.8 x10^3/uL


(4.0-11.0)


 


Red Blood Count


 


 


 


 2.67 x10^6/uL


(3.50-5.40)


 


Hemoglobin


 


 


 


 7.8 g/dL


(12.0-15.5)


 


Hematocrit


 


 


 


 24.0 %


(36.0-47.0)


 


Mean Corpuscular Volume    90 fL () 


 


Mean Corpuscular Hemoglobin    29 pg (25-35) 


 


Mean Corpuscular Hemoglobin


Concent 


 


 


 33 g/dL


(31-37)


 


Red Cell Distribution Width


 


 


 


 18.0 %


(11.5-14.5)


 


Platelet Count


 


 


 


 279 x10^3/uL


(140-400)


 


Neutrophils (%) (Auto)    71 % (31-73) 


 


Lymphocytes (%) (Auto)    20 % (24-48) 


 


Monocytes (%) (Auto)    7 % (0-9) 


 


Eosinophils (%) (Auto)    2 % (0-3) 


 


Basophils (%) (Auto)    0 % (0-3) 


 


Neutrophils # (Auto)


 


 


 


 4.8 x10^3/uL


(1.8-7.7)


 


Lymphocytes # (Auto)


 


 


 


 1.3 x10^3/uL


(1.0-4.8)


 


Monocytes # (Auto)


 


 


 


 0.5 x10^3/uL


(0.0-1.1)


 


Eosinophils # (Auto)


 


 


 


 0.1 x10^3/uL


(0.0-0.7)


 


Basophils # (Auto)


 


 


 


 0.0 x10^3/uL


(0.0-0.2)


 


Sodium Level


 


 


 


 147 mmol/L


(136-145)


 


Potassium Level


 


 


 


 3.9 mmol/L


(3.5-5.1)


 


Chloride Level


 


 


 


 117 mmol/L


()


 


Carbon Dioxide Level


 


 


 


 23 mmol/L


(21-32)


 


Anion Gap    7 (6-14) 


 


Blood Urea Nitrogen


 


 


 


 38 mg/dL


(7-20)


 


Creatinine


 


 


 


 1.0 mg/dL


(0.6-1.0)


 


Estimated GFR


(Cockcroft-Gault) 


 


 


 58.9 





 


Glucose Level


 


 


 


 164 mg/dL


(70-99)


 


Calcium Level


 


 


 


 10.0 mg/dL


(8.5-10.1)


 


Test


 6/10/20


05:54 


 


 





 


Glucose (Fingerstick)


 152 mg/dL


(70-99) 


 


 











Microbiology


6/7/20 Gram Stain - Final, Resulted


         


6/7/20 Aerobic and Anaerobic Culture - Preliminary, Resulted


         


6/7/20 Blood Culture - Preliminary, Resulted


         NO GROWTH AFTER 2 DAYS


6/7/20 Urine Culture - Final, Complete


         


5/30/20 Gram Stain - Final, Complete


          


5/30/20 Aerobic Culture - Final, Complete


Medications





Current Medications


Sodium Chloride 1,000 ml @  1,000 mls/hr Q1H IV  Last administered on 3/16/20at 

03:00;  Start 3/16/20 at 03:00;  Stop 3/16/20 at 03:59;  Status DC


Ondansetron HCl (Zofran) 4 mg 1X  ONCE IVP  Last administered on 3/16/20at 

03:27;  Start 3/16/20 at 03:00;  Stop 3/16/20 at 03:01;  Status DC


Morphine Sulfate (Morphine Sulfate) 4 mg 1X  ONCE IV ;  Start 3/16/20 at 03:00; 

Stop 3/16/20 at 03:01;  Status Cancel


Ketorolac Tromethamine (Toradol 30mg Vial) 30 mg 1X  ONCE IV  Last administered 

on 3/16/20at 02:54;  Start 3/16/20 at 03:00;  Stop 3/16/20 at 03:01;  Status DC


Fentanyl Citrate (Fentanyl 2ml Vial) 25 mcg 1X  ONCE IVP  Last administered on 

3/16/20at 03:23;  Start 3/16/20 at 03:30;  Stop 3/16/20 at 03:31;  Status DC


Fentanyl Citrate (Fentanyl 2ml Vial) 100 mcg STK-MED ONCE .ROUTE ;  Start 

3/16/20 at 03:18;  Stop 3/16/20 at 03:18;  Status DC


Iohexol (Omnipaque 350 Mg/ml) 90 ml 1X  ONCE IV  Last administered on 3/16/20at 

03:25;  Start 3/16/20 at 03:30;  Stop 3/16/20 at 03:31;  Status DC


Info (CONTRAST GIVEN -- Rx MONITORING) 1 each PRN DAILY  PRN MC SEE COMMENTS;  

Start 3/16/20 at 03:30;  Stop 3/18/20 at 03:29;  Status DC


Hydromorphone HCl (Dilaudid) 0.5 mg 1X  ONCE IV  Last administered on 3/16/20at 

03:55;  Start 3/16/20 at 04:30;  Stop 3/16/20 at 04:32;  Status DC


Ondansetron HCl (Zofran) 4 mg PRN Q8HRS  PRN IV NAUSEA/VOMITING 1ST CHOICE;  

Start 3/16/20 at 05:00;  Stop 3/16/20 at 09:27;  Status DC


Morphine Sulfate (Morphine Sulfate) 2 mg PRN Q2HR  PRN IV SEVERE PAIN 7-10 Last 

administered on 3/17/20at 12:26;  Start 3/16/20 at 05:00;  Stop 3/17/20 at 

14:15;  Status DC


Sodium Chloride 1,000 ml @  125 mls/hr Q8H IV  Last administered on 3/16/20at 

20:56;  Start 3/16/20 at 05:00;  Stop 3/17/20 at 04:59;  Status DC


Hydromorphone HCl (Dilaudid) 0.5 mg PRN Q3HRS  PRN IV SEVERE PAIN 7-10 Last 

administered on 3/17/20at 10:06;  Start 3/16/20 at 05:00;  Stop 3/17/20 at 

12:01;  Status DC


Piperacillin Sod/ Tazobactam Sod 4.5 gm/Sodium Chloride 100 ml @  200 mls/hr 1X 

ONCE IV  Last administered on 3/16/20at 05:44;  Start 3/16/20 at 06:00;  Stop 

3/16/20 at 06:29;  Status DC


Ondansetron HCl (Zofran) 4 mg PRN Q4HRS  PRN IV NAUSEA/VOMITING 1ST CHOICE Last 

administered on 6/8/20at 21:08;  Start 3/16/20 at 09:30


Insulin Human Lispro (HumaLOG) 0-9 UNITS Q6HRS SQ  Last administered on 

6/10/20at 05:56;  Start 3/16/20 at 09:30


Dextrose (Dextrose 50%-Water Syringe) 12.5 gm PRN Q15MIN  PRN IV SEE COMMENTS;  

Start 3/16/20 at 09:30


Pantoprazole Sodium (PROTONIX VIAL for IV PUSH) 40 mg DAILYAC IVP  Last 

administered on 6/9/20at 09:16;  Start 3/16/20 at 11:30


Prochlorperazine Edisylate (Compazine) 10 mg PRN Q6HRS  PRN IV NAUSEA/VOMITING, 

2nd CHOICE Last administered on 6/9/20at 03:45;  Start 3/16/20 at 17:45


Atenolol (Tenormin) 100 mg DAILY PO ;  Start 3/17/20 at 09:00;  Stop 3/16/20 at 

20:08;  Status DC


Metoprolol Tartrate (Lopressor Vial) 2.5 mg Q6HRS IVP  Last administered on 

3/17/20at 05:51;  Start 3/16/20 at 20:15;  Stop 3/17/20 at 10:02;  Status DC


Metoprolol Tartrate (Lopressor Vial) 5 mg Q6HRS IVP  Last administered on 

3/26/20at 00:12;  Start 3/17/20 at 10:15;  Stop 3/28/20 at 08:48;  Status DC


Hydromorphone HCl (Dilaudid) 1 mg PRN Q3HRS  PRN IV SEVERE PAIN 7-10 Last 

administered on 3/23/20at 05:13;  Start 3/17/20 at 12:00;  Stop 3/31/20 at 

00:25;  Status DC


Lidocaine HCl (Buffered Lidocaine 1%) 3 ml STK-MED ONCE .ROUTE ;  Start 3/17/20 

at 12:55;  Stop 3/17/20 at 12:56;  Status DC


Albumin Human 500 ml @  125 mls/hr 1X  ONCE IV  Last administered on 3/17/20at 

14:33;  Start 3/17/20 at 14:30;  Stop 3/17/20 at 18:32;  Status DC


Norepinephrine Bitartrate 8 mg/ Dextrose 258 ml @  17.299 mls/ hr CONT  PRN IV 

PER PROTOCOL Last administered on 4/14/20at 12:48;  Start 3/17/20 at 15:30;  

Stop 4/17/20 at 09:19;  Status DC


Sodium Chloride 1,000 ml @  125 mls/hr Q8H IV  Last administered on 3/17/20at 

21:04;  Start 3/17/20 at 16:00;  Stop 3/18/20 at 02:42;  Status DC


Albumin Human 500 ml @  125 mls/hr PRN BID  PRN IV After every 2L NSS & BP < 

90mm Last administered on 6/6/20at 11:40;  Start 3/17/20 at 16:00


Iohexol (Omnipaque 300 Mg/ml) 60 ml 1X  ONCE IV  Last administered on 3/17/20at 

17:20;  Start 3/17/20 at 17:00;  Stop 3/17/20 at 17:01;  Status DC


Info (CONTRAST GIVEN -- Rx MONITORING) 1 each PRN DAILY  PRN MC SEE COMMENTS;  

Start 3/17/20 at 17:00;  Stop 3/19/20 at 16:59;  Status DC


Meropenem 1 gm/ Sodium Chloride 100 ml @  200 mls/hr Q8HRS IV  Last administered

on 3/18/20at 05:45;  Start 3/17/20 at 20:00;  Stop 3/18/20 at 08:48;  Status DC


Furosemide (Lasix) 40 mg 1X  ONCE IVP  Last administered on 3/17/20at 22:12;  

Start 3/17/20 at 22:30;  Stop 3/17/20 at 22:31;  Status DC


Calcium Chloride 1000 mg/Sodium Chloride 110 ml @  220 mls/hr 1X  ONCE IV  Last 

administered on 3/17/20at 22:11;  Start 3/17/20 at 22:30;  Stop 3/17/20 at 

22:59;  Status DC


Albuterol Sulfate (Ventolin Neb Soln) 2.5 mg 1X  ONCE NEB  Last administered on 

3/18/20at 00:56;  Start 3/17/20 at 22:30;  Stop 3/17/20 at 22:31;  Status DC


Insulin Human Regular (HumuLIN R VIAL) 5 unit 1X  ONCE IV  Last administered on 

3/17/20at 22:14;  Start 3/17/20 at 22:30;  Stop 3/17/20 at 22:31;  Status DC


Magnesium Sulfate 50 ml @ 25 mls/hr 1X  ONCE IV  Last administered on 3/18/20at 

02:57;  Start 3/18/20 at 03:00;  Stop 3/18/20 at 04:59;  Status DC


Calcium Gluconate 1000 mg/Sodium Chloride 110 ml @  220 mls/hr 1X  ONCE IV  Last

administered on 3/18/20at 02:46;  Start 3/18/20 at 03:00;  Stop 3/18/20 at 

03:29;  Status DC


Sodium Chloride 1,000 ml @  200 mls/hr Q5H IV  Last administered on 3/18/20at 

02:46;  Start 3/18/20 at 03:00;  Stop 3/18/20 at 10:21;  Status DC


Calcium Gluconate 1000 mg/Sodium Chloride 110 ml @  220 mls/hr 1X  ONCE IV  Last

administered on 3/18/20at 03:21;  Start 3/18/20 at 03:30;  Stop 3/18/20 at 

03:59;  Status DC


Sodium Bicarbonate 50 meq/Sodium Chloride 1,050 ml @  75 mls/hr Q14H IV  Last 

administered on 3/22/20at 21:10;  Start 3/18/20 at 07:30;  Stop 3/23/20 at 

10:28;  Status DC


Calcium Gluconate 2000 mg/Sodium Chloride 120 ml @  220 mls/hr 1X  ONCE IV  Last

administered on 3/18/20at 09:05;  Start 3/18/20 at 07:30;  Stop 3/18/20 at 

08:02;  Status DC


Lidocaine HCl (Xylocaine-Mpf 1% 2ml Vial) 2 ml STK-MED ONCE .ROUTE ;  Start 

3/18/20 at 08:47;  Stop 3/18/20 at 08:47;  Status DC


Meropenem 500 mg/ Sodium Chloride 50 ml @  100 mls/hr Q12HR IV  Last 

administered on 3/23/20at 21:01;  Start 3/18/20 at 18:00;  Stop 3/24/20 at 07:

58;  Status DC


Lidocaine HCl (Buffered Lidocaine 1%) 3 ml STK-MED ONCE .ROUTE ;  Start 3/18/20 

at 09:46;  Stop 3/18/20 at 09:46;  Status DC


Lidocaine HCl (Buffered Lidocaine 1%) 6 ml 1X  ONCE INJ  Last administered on 

3/18/20at 10:26;  Start 3/18/20 at 10:15;  Stop 3/18/20 at 10:16;  Status DC


Info (Tpn Per Pharmacy) 1 each PRN DAILY  PRN MC SEE COMMENTS Last administered 

on 6/9/20at 12:43;  Start 3/18/20 at 12:00


Sodium Chloride 1,000 ml @  1,000 mls/hr Q1H PRN IV hypotension;  Start 3/18/20 

at 12:07;  Stop 3/18/20 at 18:06;  Status DC


Diphenhydramine HCl (Benadryl) 25 mg 1X PRN  PRN IV ITCHING;  Start 3/18/20 at 

12:15;  Stop 3/19/20 at 12:14;  Status DC


Diphenhydramine HCl (Benadryl) 25 mg 1X PRN  PRN IV ITCHING;  Start 3/18/20 at 

12:15;  Stop 3/19/20 at 12:14;  Status DC


Sodium Chloride 1,000 ml @  400 mls/hr Q2H30M PRN IV PATENCY;  Start 3/18/20 at 

12:07;  Stop 3/19/20 at 00:06;  Status DC


Info (PHARMACY MONITORING -- do not chart) 1 each PRN DAILY  PRN MC SEE 

COMMENTS;  Start 3/18/20 at 12:15;  Stop 3/20/20 at 08:13;  Status DC


Sodium Chloride 90 meq/Calcium Gluconate 10 meq/ Multivitamins 10 ml/Chromium/ 

Copper/Manganese/ Seleni/Zn 1 ml/ Total Parenteral Nutrition/Amino 

Acids/Dextrose/ Fat Emulsion Intravenous 55.005 ml  @ 2.292 mls/hr TPN  CONT IV 

;  Start 3/18/20 at 22:00;  Stop 3/18/20 at 12:33;  Status DC


Info (Tpn Per Pharmacy) 1 each PRN DAILY  PRN MC SEE COMMENTS;  Start 3/18/20 at

12:30;  Status UNV


Sodium Chloride 90 meq/Calcium Gluconate 10 meq/ Multivitamins 10 ml/Chromium/ 

Copper/Manganese/ Seleni/Zn 0.5 ml/ Total Parenteral Nutrition/Amino 

Acids/Dextrose/ Fat Emulsion Intravenous 1,512 ml @  63 mls/hr TPN  CONT IV  La

st administered on 3/18/20at 22:06;  Start 3/18/20 at 22:00;  Stop 3/19/20 at 

21:59;  Status DC


Calcium Carbonate/ Glycine (Tums) 500 mg PRN AFTMEALHC  PRN PO INDIGESTION;  

Start 3/18/20 at 17:45;  Stop 5/13/20 at 10:25;  Status DC


Calcium Gluconate (Calcium Gluconate) 2,000 mg 1X  ONCE IVP  Last administered 

on 3/19/20at 02:19;  Start 3/19/20 at 02:15;  Stop 3/19/20 at 02:16;  Status DC


Calcium Chloride 3000 mg/Sodium Chloride 1,030 ml @  50 mls/hr [id number] IV  Last 

administered on 3/21/20at 02:17;  Start 3/19/20 at 08:00;  Stop 3/21/20 at 

15:23;  Status DC


Lorazepam (Ativan Inj) 1 mg PRN Q4HRS  PRN IVP ANXIETY / AGITATION, 2nd choic 

Last administered on 4/17/20at 03:51;  Start 3/19/20 at 09:00;  Stop 4/17/20 at 

09:19;  Status DC


Sodium Chloride 1,000 ml @  1,000 mls/hr Q1H PRN IV hypotension;  Start 3/19/20 

at 08:56;  Stop 3/19/20 at 14:55;  Status DC


Albumin Human 200 ml @  200 mls/hr 1X PRN  PRN IV Hypotension;  Start 3/19/20 at

09:00;  Stop 3/19/20 at 14:59;  Status DC


Diphenhydramine HCl (Benadryl) 25 mg 1X PRN  PRN IV ITCHING;  Start 3/19/20 at 

09:00;  Stop 3/20/20 at 08:59;  Status DC


Diphenhydramine HCl (Benadryl) 25 mg 1X PRN  PRN IV ITCHING;  Start 3/19/20 at 

09:00;  Stop 3/20/20 at 08:59;  Status DC


Sodium Chloride 1,000 ml @  400 mls/hr Q2H30M PRN IV PATENCY;  Start 3/19/20 at 

08:56;  Stop 3/19/20 at 20:55;  Status DC


Info (PHARMACY MONITORING -- do not chart) 1 each PRN DAILY  PRN MC SEE 

COMMENTS;  Start 3/19/20 at 09:00;  Status UNV


Info (PHARMACY MONITORING -- do not chart) 1 each PRN DAILY  PRN MC SEE 

COMMENTS;  Start 3/19/20 at 09:00;  Stop 3/20/20 at 08:13;  Status DC


Digoxin (Lanoxin) 500 mcg 1X  ONCE IV  Last administered on 3/19/20at 10:04;  

Start 3/19/20 at 10:00;  Stop 3/19/20 at 10:01;  Status DC


Digoxin (Lanoxin) 125 mcg 1X  ONCE IV  Last administered on 3/19/20at 17:10;  

Start 3/19/20 at 18:00;  Stop 3/19/20 at 18:01;  Status DC


Magnesium Sulfate 100 ml @  25 mls/hr 1X  ONCE IV  Last administered on 

3/19/20at 12:48;  Start 3/19/20 at 13:00;  Stop 3/19/20 at 16:59;  Status DC


Sodium Chloride 90 meq/Magnesium Sulfate 10 meq/ Calcium Gluconate 20 meq/ 

Multivitamins 10 ml/Chromium/ Copper/Manganese/ Seleni/Zn 0.5 ml/ Total 

Parenteral Nutrition/Amino Acids/Dextrose/ Fat Emulsion Intravenous 1,512 ml @  

63 mls/hr TPN  CONT IV  Last administered on 3/19/20at 22:25;  Start 3/19/20 at 

22:00;  Stop 3/20/20 at 21:59;  Status DC


Sodium Chloride 1,000 ml @  1,000 mls/hr Q1H PRN IV hypotension;  Start 3/20/20 

at 08:05;  Stop 3/20/20 at 14:04;  Status DC


Albumin Human 200 ml @  200 mls/hr 1X  ONCE IV  Last administered on 3/20/20at 

08:57;  Start 3/20/20 at 08:15;  Stop 3/20/20 at 09:14;  Status DC


Diphenhydramine HCl (Benadryl) 25 mg 1X PRN  PRN IV ITCHING;  Start 3/20/20 at 

08:15;  Stop 3/21/20 at 08:14;  Status DC


Diphenhydramine HCl (Benadryl) 25 mg 1X PRN  PRN IV ITCHING;  Start 3/20/20 at 

08:15;  Stop 3/21/20 at 08:14;  Status DC


Sodium Chloride 1,000 ml @  400 mls/hr Q2H30M PRN IV PATENCY;  Start 3/20/20 at 

08:05;  Stop 3/20/20 at 20:04;  Status DC


Info (PHARMACY MONITORING -- do not chart) 1 each PRN DAILY  PRN MC SEE 

COMMENTS;  Start 3/20/20 at 08:15;  Stop 3/24/20 at 07:57;  Status DC


Sodium Chloride 90 meq/Potassium Chloride 15 meq/ Potassium Phosphate 10 mmol/ 

Magnesium Sulfate 10 meq/Calcium Gluconate 20 meq/ Multivitamins 10 ml/Chromium/

Copper/Manganese/ Seleni/Zn 0.5 ml/ Total Parenteral Nutrition/Amino 

Acids/Dextrose/ Fat Emulsion Intravenous 1,512 ml @  63 mls/hr TPN  CONT IV  

Last administered on 3/20/20at 21:01;  Start 3/20/20 at 22:00;  Stop 3/21/20 at 

21:59;  Status DC


Potassium Chloride/Water 100 ml @  100 mls/hr 1X  ONCE IV  Last administered on 

3/20/20at 14:09;  Start 3/20/20 at 14:00;  Stop 3/20/20 at 14:59;  Status DC


Benzocaine (Hurricaine One) 1 spray 1X  ONCE MM  Last administered on 3/20/20at 

16:38;  Start 3/20/20 at 14:30;  Stop 3/20/20 at 14:31;  Status DC


Lidocaine HCl (Glydo (Lidocaine) Jelly) 1 thomas 1X  ONCE MM  Last administered on 

3/20/20at 16:38;  Start 3/20/20 at 14:30;  Stop 3/20/20 at 14:31;  Status DC


Linezolid/Dextrose 300 ml @  300 mls/hr Q12HR IV  Last administered on 3/26/20at

21:04;  Start 3/20/20 at 20:00;  Stop 3/27/20 at 07:50;  Status DC


Acetaminophen (Tylenol) 650 mg PRN Q6HRS  PRN PO MILD PAIN / TEMP;  Start 

3/21/20 at 03:30;  Stop 3/21/20 at 03:36;  Status DC


Acetaminophen (Tylenol) 650 mg PRN Q6HRS  PRN PEG MILD PAIN / TEMP Last 

administered on 4/16/20at 19:56;  Start 3/21/20 at 03:36;  Stop 5/13/20 at 

10:25;  Status DC


Sodium Chloride 1,000 ml @  1,000 mls/hr Q1H PRN IV hypotension;  Start 3/21/20 

at 07:50;  Stop 3/21/20 at 13:49;  Status DC


Albumin Human 200 ml @  200 mls/hr 1X PRN  PRN IV Hypotension;  Start 3/21/20 at

08:00;  Stop 3/21/20 at 13:59;  Status DC


Sodium Chloride (Normal Saline Flush) 10 ml 1X PRN  PRN IV AP catheter pack;  

Start 3/21/20 at 08:00;  Stop 3/22/20 at 07:59;  Status DC


Sodium Chloride (Normal Saline Flush) 10 ml 1X PRN  PRN IV  catheter pack;  

Start 3/21/20 at 08:00;  Stop 3/22/20 at 07:59;  Status DC


Sodium Chloride 1,000 ml @  400 mls/hr Q2H30M PRN IV PATENCY;  Start 3/21/20 at 

07:50;  Stop 3/21/20 at 19:49;  Status DC


Info (PHARMACY MONITORING -- do not chart) 1 each PRN DAILY  PRN MC SEE 

COMMENTS;  Start 3/21/20 at 08:00;  Status UNV


Info (PHARMACY MONITORING -- do not chart) 1 each PRN DAILY  PRN MC SEE 

COMMENTS;  Start 3/21/20 at 08:00;  Stop 3/23/20 at 08:25;  Status DC


Sodium Chloride 90 meq/Potassium Chloride 15 meq/ Potassium Phosphate 10 mmol/ 

Magnesium Sulfate 10 meq/Calcium Gluconate 20 meq/ Multivitamins 10 ml/Chromium/

Copper/Manganese/ Seleni/Zn 0.5 ml/ Total Parenteral Nutrition/Amino 

Acids/Dextrose/ Fat Emulsion Intravenous 1,512 ml @  63 mls/hr TPN  CONT IV  

Last administered on 3/21/20at 20:57;  Start 3/21/20 at 22:00;  Stop 3/22/20 at 

21:59;  Status DC


Sodium Chloride 90 meq/Potassium Chloride 15 meq/ Potassium Phosphate 15 mmol/ 

Magnesium Sulfate 10 meq/Calcium Gluconate 20 meq/ Multivitamins 10 ml/Chromium/

Copper/Manganese/ Seleni/Zn 0.5 ml/ Total Parenteral Nutrition/Amino Acids/Dextr

ose/ Fat Emulsion Intravenous 1,512 ml @  63 mls/hr TPN  CONT IV ;  Start 

3/22/20 at 22:00;  Stop 3/22/20 at 14:16;  Status DC


Sodium Chloride 90 meq/Potassium Chloride 15 meq/ Potassium Phosphate 15 mmol/ 

Magnesium Sulfate 10 meq/Calcium Gluconate 20 meq/ Multivitamins 10 ml/Chromium/

Copper/Manganese/ Seleni/Zn 0.5 ml/ Total Parenteral Nutrition/Amino 

Acids/Dextrose/ Fat Emulsion Intravenous 1,200 ml @  50 mls/hr TPN  CONT IV ;  

Start 3/22/20 at 22:00;  Stop 3/22/20 at 14:17;  Status DC


Sodium Chloride 90 meq/Potassium Chloride 15 meq/ Potassium Phosphate 10 mmol/ 

Magnesium Sulfate 10 meq/Calcium Gluconate 20 meq/ Multivitamins 10 ml/Chromium/

Copper/Manganese/ Seleni/Zn 0.5 ml/ Total Parenteral Nutrition/Amino 

Acids/Dextrose/ Fat Emulsion Intravenous 1,200 ml @  50 mls/hr TPN  CONT IV  

Last administered on 3/22/20at 23:29;  Start 3/22/20 at 22:00;  Stop 3/23/20 at 

21:59;  Status DC


Sodium Chloride 1,000 ml @  1,000 mls/hr Q1H PRN IV hypotension;  Start 3/23/20 

at 07:28;  Stop 3/23/20 at 13:27;  Status DC


Albumin Human 200 ml @  200 mls/hr 1X  ONCE IV  Last administered on 3/23/20at 

08:51;  Start 3/23/20 at 07:30;  Stop 3/23/20 at 08:29;  Status DC


Diphenhydramine HCl (Benadryl) 25 mg 1X PRN  PRN IV ITCHING;  Start 3/23/20 at 

07:30;  Stop 3/24/20 at 07:29;  Status DC


Diphenhydramine HCl (Benadryl) 25 mg 1X PRN  PRN IV ITCHING;  Start 3/23/20 at 

07:30;  Stop 3/24/20 at 07:29;  Status DC


Sodium Chloride 1,000 ml @  400 mls/hr Q2H30M PRN IV PATENCY;  Start 3/23/20 at 

07:28;  Stop 3/23/20 at 19:27;  Status DC


Info (PHARMACY MONITORING -- do not chart) 1 each PRN DAILY  PRN MC SEE 

COMMENTS;  Start 3/23/20 at 07:30;  Stop 4/3/20 at 13:01;  Status DC


Metronidazole 100 ml @  100 mls/hr Q6HRS IV  Last administered on 4/8/20at 

06:26;  Start 3/23/20 at 08:30;  Stop 4/8/20 at 09:58;  Status DC


Micafungin Sodium 100 mg/Dextrose 100 ml @  100 mls/hr Q24H IV  Last 

administered on 4/30/20at 08:18;  Start 3/23/20 at 09:00;  Stop 4/30/20 at 

20:58;  Status DC


Propofol 0 ml @ As Directed STK-MED ONCE IV ;  Start 3/23/20 at 07:53;  Stop 

3/23/20 at 07:53;  Status DC


Etomidate (Amidate) 20 mg STK-MED ONCE IV ;  Start 3/23/20 at 07:53;  Stop 

3/23/20 at 07:54;  Status DC


Midazolam HCl (Versed) 5 mg STK-MED ONCE .ROUTE ;  Start 3/23/20 at 07:57;  Stop

3/23/20 at 07:57;  Status DC


Fentanyl Citrate 30 ml @ 0 mls/hr CONT  PRN IV SEE PROTOCOL Last administered on

4/17/20at 06:12;  Start 3/23/20 at 08:15;  Stop 4/17/20 at 09:19;  Status DC


Artificial Tears (Artificial Tears) 1 drop PRN Q1HR  PRN OU DRY EYE, 1st choice;

 Start 3/23/20 at 08:15;  Stop 4/29/20 at 05:31;  Status DC


Midazolam HCl 50 mg/Sodium Chloride 50 ml @ 0 mls/hr CONT  PRN IV SEE PROTOCOL 

Last administered on 3/26/20at 22:39;  Start 3/23/20 at 08:15;  Stop 3/28/20 at 

15:59;  Status DC


Etomidate (Amidate) 8 mg 1X  ONCE IV  Last administered on 3/23/20at 08:33;  

Start 3/23/20 at 08:30;  Stop 3/23/20 at 08:31;  Status DC


Succinylcholine Chloride (Anectine) 120 mg 1X  ONCE IV  Last administered on 

3/23/20at 08:34;  Start 3/23/20 at 08:30;  Stop 3/23/20 at 08:31;  Status DC


Midazolam HCl (Versed) 5 mg 1X  ONCE IV ;  Start 3/23/20 at 08:30;  Stop 3/23/20

at 08:31;  Status DC


Potassium Chloride 15 meq/ Bicarbonate Dialysis Soln w/ out KCl 5,007.5 ml  @ 

1,000 mls/ hr Q5H1M IV  Last administered on 3/24/20at 11:11;  Start 3/23/20 at 

12:00;  Stop 3/24/20 at 11:15;  Status DC


Potassium Chloride 15 meq/ Bicarbonate Dialysis Soln w/ out KCl 5,007.5 ml  @ 

1,000 mls/ hr Q5H1M IV  Last administered on 3/24/20at 11:12;  Start 3/23/20 at 

12:00;  Stop 3/24/20 at 11:17;  Status DC


Potassium Chloride 15 meq/ Bicarbonate Dialysis Soln w/ out KCl 5,007.5 ml  @ 

1,000 mls/ hr Q5H1M IV  Last administered on 3/24/20at 11:11;  Start 3/23/20 at 

12:00;  Stop 3/24/20 at 11:19;  Status DC


Sodium Chloride 90 meq/Potassium Chloride 15 meq/ Potassium Phosphate 10 mmol/ 

Magnesium Sulfate 10 meq/Calcium Gluconate 20 meq/ Multivitamins 10 ml/Chromium/

Copper/Manganese/ Seleni/Zn 0.5 ml/ Total Parenteral Nutrition/Amino 

Acids/Dextrose/ Fat Emulsion Intravenous 1,400 ml @  58.333 mls/ hr TPN  CONT IV

 Last administered on 3/23/20at 21:42;  Start 3/23/20 at 22:00;  Stop 3/24/20 at

21:59;  Status DC


Heparin Sodium (Porcine) (Heparin Sodium) 5,000 unit Q8HRS SQ  Last administered

on 3/28/20at 05:55;  Start 3/23/20 at 15:00;  Stop 3/28/20 at 13:28;  Status DC


Meropenem 500 mg/ Sodium Chloride 50 ml @  100 mls/hr Q6HRS IV  Last 

administered on 3/25/20at 06:00;  Start 3/24/20 at 09:00;  Stop 3/25/20 at 0

7:29;  Status DC


Potassium Phosphate 20 mmol/ Sodium Chloride 106.6667 ml @  51.667 m... 1X  ONCE

IV  Last administered on 3/24/20at 11:22;  Start 3/24/20 at 10:15;  Stop 3/24/20

at 12:18;  Status DC


Acetaminophen (Tylenol Supp) 650 mg PRN Q6HRS  PRN NH MILD PAIN / TEMP > 100.3'F

Last administered on 6/7/20at 14:41;  Start 3/24/20 at 10:30


Potassium Chloride/Water 100 ml @  100 mls/hr Q1H IV  Last administered on 

3/24/20at 12:12;  Start 3/24/20 at 11:00;  Stop 3/24/20 at 12:59;  Status DC


Potassium Chloride 20 meq/ Bicarbonate Dialysis Soln w/ out KCl 5,010 ml @  

1,000 mls/hr Q5H1M IV  Last administered on 3/25/20at 08:48;  Start 3/24/20 at 

12:00;  Stop 3/25/20 at 13:03;  Status DC


Potassium Chloride 20 meq/ Bicarbonate Dialysis Soln w/ out KCl 5,010 ml @  1

,000 mls/hr Q5H1M IV  Last administered on 3/29/20at 14:52;  Start 3/24/20 at 

11:30;  Stop 3/29/20 at 19:59;  Status DC


Potassium Chloride 20 meq/ Bicarbonate Dialysis Soln w/ out KCl 5,010 ml @  

1,000 mls/hr Q5H1M IV  Last administered on 3/29/20at 14:53;  Start 3/24/20 at 

11:30;  Stop 3/29/20 at 19:59;  Status DC


Sodium Chloride 90 meq/Potassium Chloride 15 meq/ Potassium Phosphate 15 mmol/ 

Magnesium Sulfate 10 meq/Calcium Gluconate 15 meq/ Multivitamins 10 ml/Chromium/

Copper/Manganese/ Seleni/Zn 0.5 ml/ Total Parenteral Nutrition/Amino 

Acids/Dextrose/ Fat Emulsion Intravenous 1,400 ml @  58.333 mls/ hr TPN  CONT IV

 Last administered on 3/24/20at 22:17;  Start 3/24/20 at 22:00;  Stop 3/25/20 at

21:59;  Status DC


Cefepime HCl (Maxipime) 2 gm Q12HR IVP  Last administered on 4/7/20at 20:56;  

Start 3/25/20 at 09:00;  Stop 4/8/20 at 09:58;  Status DC


Daptomycin 500 mg/ Sodium Chloride 50 ml @  100 mls/hr Q48H IV  Last 

administered on 4/10/20at 09:57;  Start 3/25/20 at 08:30;  Stop 4/10/20 at 

10:07;  Status DC


Lidocaine HCl (Buffered Lidocaine 1%) 3 ml 1X  ONCE INJ  Last administered on 

3/25/20at 10:27;  Start 3/25/20 at 10:30;  Stop 3/25/20 at 10:31;  Status DC


Potassium Phosphate 20 mmol/ Sodium Chloride 106.6667 ml @  51.667 m... 1X  ONCE

IV  Last administered on 3/25/20at 12:51;  Start 3/25/20 at 13:00;  Stop 3/25/20

at 15:03;  Status DC


Sodium Chloride 90 meq/Potassium Chloride 15 meq/ Potassium Phosphate 18 mmol/ 

Magnesium Sulfate 8 meq/Calcium Gluconate 15 meq/ Multivitamins 10 ml/Chromium/ 

Copper/Manganese/ Seleni/Zn 0.5 ml/ Total Parenteral Nutrition/Amino 

Acids/Dextrose/ Fat Emulsion Intravenous 1,400 ml @  58.333 mls/ hr TPN  CONT IV

 Last administered on 3/25/20at 22:16;  Start 3/25/20 at 22:00;  Stop 3/26/20 at

21:59;  Status DC


Potassium Chloride 20 meq/ Bicarbonate Dialysis Soln w/ out KCl 5,010 ml @  

1,000 mls/hr Q5H1M IV  Last administered on 3/29/20at 14:54;  Start 3/25/20 at 

16:00;  Stop 3/29/20 at 19:59;  Status DC


Multi-Ingred Cream/Lotion/Oil/ Oint (Artificial Tears Eye Ointment) 1 thomas PRN 

Q1HR  PRN OU DRY EYE, 2nd choice Last administered on 4/13/20at 08:19;  Start 

3/25/20 at 17:30;  Stop 6/3/20 at 14:39;  Status DC


Sodium Chloride 90 meq/Potassium Chloride 15 meq/ Potassium Phosphate 18 mmol/ 

Magnesium Sulfate 8 meq/Calcium Gluconate 15 meq/ Multivitamins 10 ml/Chromium/ 

Copper/Manganese/ Seleni/Zn 0.5 ml/ Total Parenteral Nutrition/Amino 

Acids/Dextrose/ Fat Emulsion Intravenous 1,400 ml @  58.333 mls/ hr TPN  CONT IV

 Last administered on 3/26/20at 22:00;  Start 3/26/20 at 22:00;  Stop 3/27/20 at

21:59;  Status DC


Albumin Human 500 ml @  125 mls/hr 1X  ONCE IV ;  Start 3/26/20 at 14:15;  Stop 

3/26/20 at 18:14;  Status DC


Sodium Chloride 90 meq/Potassium Chloride 15 meq/ Potassium Phosphate 18 mmol/ 

Magnesium Sulfate 8 meq/Calcium Gluconate 15 meq/ Multivitamins 10 ml/Chromium/ 

Copper/Manganese/ Seleni/Zn 0.5 ml/ Insulin Human Regular 10 unit/ Total 

Parenteral Nutrition/Amino Acids/Dextrose/ Fat Emulsion Intravenous 1,400 ml @  

58.333 mls/ hr TPN  CONT IV  Last administered on 3/27/20at 21:43;  Start 

3/27/20 at 22:00;  Stop 3/28/20 at 21:59;  Status DC


Lidocaine HCl (Buffered Lidocaine 1%) 3 ml STK-MED ONCE .ROUTE ;  Start 3/25/20 

at 10:00;  Stop 3/27/20 at 13:57;  Status DC


Midazolam HCl 100 mg/Sodium Chloride 100 ml @ 7 mls/hr CONT  PRN IV SEE PROTOCOL

Last administered on 4/8/20at 15:35;  Start 3/28/20 at 16:00;  Stop 6/3/20 at 

14:38;  Status DC


Sodium Chloride 90 meq/Potassium Chloride 15 meq/ Potassium Phosphate 18 mmol/ 

Magnesium Sulfate 8 meq/Calcium Gluconate 15 meq/ Multivitamins 10 ml/Chromium/ 

Copper/Manganese/ Seleni/Zn 0.5 ml/ Insulin Human Regular 15 unit/ Total 

Parenteral Nutrition/Amino Acids/Dextrose/ Fat Emulsion Intravenous 1,400 ml @  

58.333 mls/ hr TPN  CONT IV  Last administered on 3/28/20at 20:34;  Start 

3/28/20 at 22:00;  Stop 3/29/20 at 21:59;  Status DC


Info ([facility] Electrolyte Protocol) 1 ea CONT PRN  PRN MC PER PROTOCOL;  Start 

3/29/20 at 13:15


Sodium Chloride 90 meq/Potassium Chloride 15 meq/ Potassium Phosphate 18 mmol/ 

Magnesium Sulfate 8 meq/Calcium Gluconate 15 meq/ Multivitamins 10 ml/Chromium/ 

Copper/Manganese/ Seleni/Zn 0.5 ml/ Insulin Human Regular 15 unit/ Total 

Parenteral Nutrition/Amino Acids/Dextrose/ Fat Emulsion Intravenous 1,400 ml @  

58.333 mls/ hr TPN  CONT IV  Last administered on 3/29/20at 22:05;  Start 3/

29/20 at 22:00;  Stop 3/30/20 at 21:59;  Status DC


Potassium Chloride 15 meq/ Bicarbonate Dialysis Soln w/ out KCl 5,007.5 ml  @ 

1,000 mls/ hr Q5H1M IV  Last administered on 4/1/20at 18:14;  Start 3/29/20 at 

20:00;  Stop 4/2/20 at 13:08;  Status DC


Potassium Chloride 15 meq/ Bicarbonate Dialysis Soln w/ out KCl 5,007.5 ml  @ 

1,000 mls/ hr Q5H1M IV  Last administered on 4/1/20at 18:14;  Start 3/29/20 at 

20:00;  Stop 4/2/20 at 13:08;  Status DC


Potassium Chloride 15 meq/ Bicarbonate Dialysis Soln w/ out KCl 5,007.5 ml  @ 

1,000 mls/ hr Q5H1M IV  Last administered on 4/1/20at 18:14;  Start 3/29/20 at 

20:00;  Stop 4/2/20 at 13:08;  Status DC


Iohexol (Omnipaque 240 Mg/ml) 30 ml 1X  ONCE PO  Last administered on 3/30/20at 

11:30;  Start 3/30/20 at 11:30;  Stop 3/30/20 at 11:33;  Status DC


Info (CONTRAST GIVEN -- Rx MONITORING) 1 each PRN DAILY  PRN MC SEE COMMENTS;  

Start 3/30/20 at 11:45;  Stop 4/1/20 at 11:44;  Status DC


Sodium Chloride 90 meq/Potassium Chloride 15 meq/ Potassium Phosphate 18 mmol/ 

Magnesium Sulfate 8 meq/Calcium Gluconate 15 meq/ Multivitamins 10 ml/Chromium/ 

Copper/Manganese/ Seleni/Zn 0.5 ml/ Insulin Human Regular 15 unit/ Total 

Parenteral Nutrition/Amino Acids/Dextrose/ Fat Emulsion Intravenous 1,400 ml @  

58.333 mls/ hr TPN  CONT IV  Last administered on 3/30/20at 21:47;  Start 

3/30/20 at 22:00;  Stop 3/31/20 at 21:59;  Status DC


Sodium Chloride 90 meq/Potassium Chloride 15 meq/ Potassium Phosphate 18 mmol/ 

Magnesium Sulfate 8 meq/Calcium Gluconate 15 meq/ Multivitamins 10 ml/Chromium/ 

Copper/Manganese/ Seleni/Zn 0.5 ml/ Insulin Human Regular 20 unit/ Total 

Parenteral Nutrition/Amino Acids/Dextrose/ Fat Emulsion Intravenous 1,400 ml @  

58.333 mls/ hr TPN  CONT IV  Last administered on 3/31/20at 21:36;  Start 

3/31/20 at 22:00;  Stop 4/1/20 at 21:59;  Status DC


Alteplase, Recombinant (Cathflo For Central Catheter Clearance) 1 mg 1X  ONCE 

INT CAT  Last administered on 3/31/20at 20:03;  Start 3/31/20 at 19:30;  Stop 

3/31/20 at 19:46;  Status DC


Alteplase, Recombinant (Cathflo For Central Catheter Clearance) 1 mg 1X  ONCE 

INT CAT  Last administered on 3/31/20at 22:05;  Start 3/31/20 at 22:00;  Stop 

3/31/20 at 22:01;  Status DC


Sodium Chloride 90 meq/Potassium Chloride 15 meq/ Potassium Phosphate 18 mmol/ 

Magnesium Sulfate 8 meq/Calcium Gluconate 15 meq/ Multivitamins 10 ml/Chromium/ 

Copper/Manganese/ Seleni/Zn 0.5 ml/ Insulin Human Regular 20 unit/ Total 

Parenteral Nutrition/Amino Acids/Dextrose/ Fat Emulsion Intravenous 1,400 ml @  

58.333 mls/ hr TPN  CONT IV  Last administered on 4/1/20at 21:30;  Start 4/1/20 

at 22:00;  Stop 4/2/20 at 21:59;  Status DC


Dexmedetomidine HCl 400 mcg/ Sodium Chloride 100 ml @ 0 mls/hr CONT  PRN IV 

ANXIETY / AGITATION Last administered on 5/30/20at 12:57;  Start 4/2/20 at 

08:15;  Stop 5/30/20 at 18:31;  Status DC


Sodium Chloride 500 ml @  500 mls/hr 1X PRN  PRN IV ELEVATED BP, SEE COMMENTS;  

Start 4/2/20 at 08:15


Atropine Sulfate (ATROPINE 0.5mg SYRINGE) 0.5 mg PRN Q5MIN  PRN IV SEE COMMENTS;

 Start 4/2/20 at 08:15


Furosemide (Lasix) 20 mg 1X  ONCE IVP  Last administered on 4/2/20at 08:19;  

Start 4/2/20 at 08:15;  Stop 4/2/20 at 08:16;  Status DC


Lidocaine HCl (Buffered Lidocaine 1%) 3 ml STK-MED ONCE .ROUTE ;  Start 4/2/20 

at 08:39;  Stop 4/2/20 at 08:39;  Status DC


Lidocaine HCl (Buffered Lidocaine 1%) 6 ml 1X  ONCE INJ  Last administered on 

4/2/20at 09:05;  Start 4/2/20 at 09:00;  Stop 4/2/20 at 09:06;  Status DC


Sodium Chloride 90 meq/Potassium Chloride 15 meq/ Potassium Phosphate 18 mmol/ 

Magnesium Sulfate 8 meq/Calcium Gluconate 15 meq/ Multivitamins 10 ml/Chromium/ 

Copper/Manganese/ Seleni/Zn 0.5 ml/ Insulin Human Regular 20 unit/ Total 

Parenteral Nutrition/Amino Acids/Dextrose/ Fat Emulsion Intravenous 1,400 ml @  

58.333 mls/ hr TPN  CONT IV  Last administered on 4/2/20at 22:45;  Start 4/2/20 

at 22:00;  Stop 4/3/20 at 21:59;  Status DC


Sodium Chloride 1,000 ml @  1,000 mls/hr Q1H PRN IV hypotension;  Start 4/3/20 

at 07:30;  Stop 4/3/20 at 13:29;  Status DC


Albumin Human 200 ml @  200 mls/hr 1X PRN  PRN IV Hypotension Last administered 

on 4/3/20at 09:36;  Start 4/3/20 at 07:30;  Stop 4/3/20 at 13:29;  Status DC


Sodium Chloride (Normal Saline Flush) 10 ml 1X PRN  PRN IV AP catheter pack;  

Start 4/3/20 at 07:30;  Stop 4/3/20 at 21:29;  Status DC


Sodium Chloride (Normal Saline Flush) 10 ml 1X PRN  PRN IV  catheter pack;  

Start 4/3/20 at 07:30;  Stop 4/4/20 at 07:29;  Status DC


Sodium Chloride 1,000 ml @  400 mls/hr Q2H30M PRN IV PATENCY;  Start 4/3/20 at 

07:30;  Stop 4/3/20 at 19:29;  Status DC


Info (PHARMACY MONITORING -- do not chart) 1 each PRN DAILY  PRN MC SEE 

COMMENTS;  Start 4/3/20 at 07:30;  Stop 4/3/20 at 13:02;  Status DC


Info (PHARMACY MONITORING -- do not chart) 1 each PRN DAILY  PRN MC SEE 

COMMENTS;  Start 4/3/20 at 07:30;  Stop 4/5/20 at 12:45;  Status DC


Sodium Chloride 90 meq/Potassium Chloride 15 meq/ Potassium Phosphate 10 mmol/ 

Magnesium Sulfate 8 meq/Calcium Gluconate 15 meq/ Multivitamins 10 ml/Chromium/ 

Copper/Manganese/ Seleni/Zn 0.5 ml/ Insulin Human Regular 25 unit/ Total 

Parenteral Nutrition/Amino Acids/Dextrose/ Fat Emulsion Intravenous 1,400 ml @  

58.333 mls/ hr TPN  CONT IV  Last administered on 4/3/20at 22:19;  Start 4/3/20 

at 22:00;  Stop 4/4/20 at 21:59;  Status DC


Heparin Sodium (Porcine) (Heparin Sodium) 5,000 unit Q12HR SQ  Last administered

on 4/26/20at 08:59;  Start 4/3/20 at 21:00;  Stop 4/26/20 at 10:05;  Status DC


Ondansetron HCl (Zofran) 4 mg PRN Q6HRS  PRN IV NAUSEA/VOMITING;  Start 4/6/20 

at 07:00;  Stop 4/7/20 at 06:59;  Status DC


Fentanyl Citrate (Fentanyl 2ml Vial) 25 mcg PRN Q5MIN  PRN IV MILD PAIN 1-3;  

Start 4/6/20 at 07:00;  Stop 4/7/20 at 06:59;  Status DC


Fentanyl Citrate (Fentanyl 2ml Vial) 50 mcg PRN Q5MIN  PRN IV MODERATE TO SEVERE

PAIN;  Start 4/6/20 at 07:00;  Stop 4/7/20 at 06:59;  Status DC


Ringer's Solution 1,000 ml @  30 mls/hr Q24H IV ;  Start 4/6/20 at 07:00;  Stop 

4/6/20 at 18:59;  Status DC


Lidocaine HCl (Xylocaine-Mpf 1% 2ml Vial) 2 ml PRN 1X  PRN ID PRIOR TO IV START;

 Start 4/6/20 at 07:00;  Stop 4/7/20 at 06:59;  Status DC


Prochlorperazine Edisylate (Compazine) 5 mg PACU PRN  PRN IV NAUSEA, MRX1;  

Start 4/6/20 at 07:00;  Stop 4/7/20 at 06:59;  Status DC


Sodium Chloride 1,000 ml @  1,000 mls/hr Q1H PRN IV hypotension;  Start 4/4/20 

at 09:10;  Stop 4/4/20 at 15:09;  Status DC


Albumin Human 200 ml @  200 mls/hr 1X PRN  PRN IV Hypotension Last administered 

on 4/4/20at 10:10;  Start 4/4/20 at 09:15;  Stop 4/4/20 at 15:14;  Status DC


Sodium Chloride 1,000 ml @  400 mls/hr Q2H30M PRN IV PATENCY;  Start 4/4/20 at 

09:10;  Stop 4/4/20 at 21:09;  Status DC


Info (PHARMACY MONITORING -- do not chart) 1 each PRN DAILY  PRN MC SEE 

COMMENTS;  Start 4/4/20 at 09:15;  Stop 4/5/20 at 12:45;  Status DC


Info (PHARMACY MONITORING -- do not chart) 1 each PRN DAILY  PRN MC SEE 

COMMENTS;  Start 4/4/20 at 09:15;  Stop 4/5/20 at 12:45;  Status DC


Sodium Chloride 90 meq/Potassium Chloride 15 meq/ Potassium Phosphate 10 mmol/ 

Magnesium Sulfate 8 meq/Calcium Gluconate 15 meq/ Multivitamins 10 ml/Chromium/ 

Copper/Manganese/ Seleni/Zn 0.5 ml/ Insulin Human Regular 25 unit/ Total 

Parenteral Nutrition/Amino Acids/Dextrose/ Fat Emulsion Intravenous 1,400 ml @  

58.333 mls/ hr TPN  CONT IV  Last administered on 4/4/20at 22:10;  Start 4/4/20 

at 22:00;  Stop 4/5/20 at 21:59;  Status DC


Magnesium Sulfate 50 ml @ 25 mls/hr PRN DAILY  PRN IV for Mag < 1.7 on am labs 

Last administered on 4/20/20at 17:27;  Start 4/5/20 at 09:15


Sodium Chloride 90 meq/Potassium Chloride 15 meq/ Potassium Phosphate 10 mmol/ 

Magnesium Sulfate 8 meq/Calcium Gluconate 15 meq/ Multivitamins 10 ml/Chromium/ 

Copper/Manganese/ Seleni/Zn 0.5 ml/ Insulin Human Regular 25 unit/ Total Lisa

ral Nutrition/Amino Acids/Dextrose/ Fat Emulsion Intravenous 1,400 ml @  58.333 

mls/ hr TPN  CONT IV  Last administered on 4/5/20at 21:20;  Start 4/5/20 at 

22:00;  Stop 4/6/20 at 21:59;  Status DC


Sodium Chloride 1,000 ml @  1,000 mls/hr Q1H PRN IV hypotension;  Start 4/5/20 

at 12:23;  Stop 4/5/20 at 18:22;  Status DC


Albumin Human 200 ml @  200 mls/hr 1X  ONCE IV  Last administered on 4/5/20at 

13:34;  Start 4/5/20 at 12:30;  Stop 4/5/20 at 13:29;  Status DC


Diphenhydramine HCl (Benadryl) 25 mg 1X PRN  PRN IV ITCHING;  Start 4/5/20 at 

12:30;  Stop 4/6/20 at 12:29;  Status DC


Diphenhydramine HCl (Benadryl) 25 mg 1X PRN  PRN IV ITCHING;  Start 4/5/20 at 

12:30;  Stop 4/6/20 at 12:29;  Status DC


Info (PHARMACY MONITORING -- do not chart) 1 each PRN DAILY  PRN MC SEE 

COMMENTS;  Start 4/5/20 at 12:30;  Status Cancel


Bupivacaine HCl/ Epinephrine Bitart (Sensorcain-Epi 0.5%-1:041425 Mpf) 30 ml 

STK-MED ONCE .ROUTE  Last administered on 4/6/20at 11:44;  Start 4/6/20 at 

11:00;  Stop 4/6/20 at 11:01;  Status DC


Cellulose (Surgicel Fibrillar 1x2) 1 each STK-MED ONCE .ROUTE ;  Start 4/6/20 at

11:00;  Stop 4/6/20 at 11:01;  Status DC


Sodium Chloride 90 meq/Potassium Chloride 15 meq/ Potassium Phosphate 10 mmol/ 

Magnesium Sulfate 12 meq/Calcium Gluconate 15 meq/ Multivitamins 10 ml/Chromium/

Copper/Manganese/ Seleni/Zn 0.5 ml/ Insulin Human Regular 25 unit/ Total 

Parenteral Nutrition/Amino Acids/Dextrose/ Fat Emulsion Intravenous 1,400 ml @  

58.333 mls/ hr TPN  CONT IV  Last administered on 4/6/20at 22:24;  Start 4/6/20 

at 22:00;  Stop 4/7/20 at 21:59;  Status DC


Propofol 20 ml @ As Directed STK-MED ONCE IV ;  Start 4/6/20 at 11:07;  Stop 

4/6/20 at 11:07;  Status DC


Cellulose (Surgicel Hemostat 4x8) 1 each STK-MED ONCE .ROUTE  Last administered 

on 4/6/20at 11:44;  Start 4/6/20 at 11:55;  Stop 4/6/20 at 11:56;  Status DC


Sevoflurane (Ultane) 60 ml STK-MED ONCE IH ;  Start 4/6/20 at 12:46;  Stop 

4/6/20 at 12:46;  Status DC


Sodium Chloride 1,000 ml @  1,000 mls/hr Q1H PRN IV hypotension;  Start 4/6/20 

at 13:51;  Stop 4/6/20 at 19:50;  Status DC


Albumin Human 200 ml @  200 mls/hr 1X PRN  PRN IV Hypotension Last administered 

on 4/6/20at 14:51;  Start 4/6/20 at 14:00;  Stop 4/6/20 at 19:59;  Status DC


Diphenhydramine HCl (Benadryl) 25 mg 1X PRN  PRN IV ITCHING;  Start 4/6/20 at 

14:00;  Stop 4/7/20 at 13:59;  Status DC


Diphenhydramine HCl (Benadryl) 25 mg 1X PRN  PRN IV ITCHING;  Start 4/6/20 at 

14:00;  Stop 4/7/20 at 13:59;  Status DC


Sodium Chloride 1,000 ml @  400 mls/hr Q2H30M PRN IV PATENCY;  Start 4/6/20 at 

13:51;  Stop 4/7/20 at 01:50;  Status DC


Info (PHARMACY MONITORING -- do not chart) 1 each PRN DAILY  PRN MC SEE 

COMMENTS;  Start 4/6/20 at 14:00;  Stop 4/9/20 at 08:16;  Status DC


Heparin Sodium (Porcine) (Hep Lock Adult) 500 unit STK-MED ONCE IVP ;  Start 

4/7/20 at 09:29;  Stop 4/7/20 at 09:30;  Status DC


Sodium Chloride 1,000 ml @  1,000 mls/hr Q1H PRN IV hypotension;  Start 4/7/20 

at 10:43;  Stop 4/7/20 at 16:42;  Status DC


Sodium Chloride 1,000 ml @  400 mls/hr Q2H30M PRN IV PATENCY;  Start 4/7/20 at 

10:43;  Stop 4/7/20 at 22:42;  Status DC


Info (PHARMACY MONITORING -- do not chart) 1 each PRN DAILY  PRN MC SEE 

COMMENTS;  Start 4/7/20 at 10:45;  Status UNV


Info (PHARMACY MONITORING -- do not chart) 1 each PRN DAILY  PRN MC SEE 

COMMENTS;  Start 4/7/20 at 10:45;  Status UNV


Sodium Chloride 90 meq/Potassium Chloride 15 meq/ Magnesium Sulfate 12 

meq/Calcium Gluconate 15 meq/ Multivitamins 10 ml/Chromium/ Copper/Manganese/ 

Seleni/Zn 0.5 ml/ Insulin Human Regular 25 unit/ Total Parenteral 

Nutrition/Amino Acids/Dextrose/ Fat Emulsion Intravenous 1,400 ml @  58.333 mls/

hr TPN  CONT IV  Last administered on 4/7/20at 22:13;  Start 4/7/20 at 22:00;  

Stop 4/8/20 at 21:59;  Status DC


Sodium Chloride 1,000 ml @  1,000 mls/hr Q1H PRN IV hypotension;  Start 4/8/20 

at 07:50;  Stop 4/8/20 at 13:49;  Status DC


Albumin Human 200 ml @  200 mls/hr 1X  ONCE IV ;  Start 4/8/20 at 08:00;  Stop 

4/8/20 at 08:53;  Status DC


Diphenhydramine HCl (Benadryl) 25 mg 1X PRN  PRN IV ITCHING;  Start 4/8/20 at 

08:00;  Stop 4/9/20 at 07:59;  Status DC


Diphenhydramine HCl (Benadryl) 25 mg 1X PRN  PRN IV ITCHING;  Start 4/8/20 at 

08:00;  Stop 4/9/20 at 07:59;  Status DC


Info (PHARMACY MONITORING -- do not chart) 1 each PRN DAILY  PRN MC SEE 

COMMENTS;  Start 4/8/20 at 08:00;  Stop 4/9/20 at 08:16;  Status DC


Albumin Human 50 ml @ 50 mls/hr 1X  ONCE IV ;  Start 4/8/20 at 08:53;  Stop 

4/8/20 at 08:56;  Status DC


Albumin Human 200 ml @  50 mls/hr PRN 1X  PRN IV HYPOTENSION Last administered 

on 4/14/20at 11:54;  Start 4/8/20 at 09:00;  Stop 5/21/20 at 11:14;  Status DC


Meropenem 500 mg/ Sodium Chloride 50 ml @  100 mls/hr Q12H IV  Last administered

on 4/28/20at 10:45;  Start 4/8/20 at 10:00;  Stop 4/28/20 at 12:37;  Status DC


Sodium Chloride 90 meq/Magnesium Sulfate 12 meq/ Calcium Gluconate 15 meq/ 

Multivitamins 10 ml/Chromium/ Copper/Manganese/ Seleni/Zn 0.5 ml/ Insulin Human 

Regular 25 unit/ Total Parenteral Nutrition/Amino Acids/Dextrose/ Fat Emulsion 

Intravenous 1,400 ml @  58.333 mls/ hr TPN  CONT IV  Last administered on 

4/8/20at 21:41;  Start 4/8/20 at 22:00;  Stop 4/9/20 at 21:59;  Status DC


Sodium Chloride 1,000 ml @  1,000 mls/hr Q1H PRN IV hypotension;  Start 4/9/20 

at 07:58;  Stop 4/9/20 at 13:57;  Status DC


Albumin Human 200 ml @  200 mls/hr 1X PRN  PRN IV Hypotension Last administered 

on 4/9/20at 09:30;  Start 4/9/20 at 08:00;  Stop 4/9/20 at 13:59;  Status DC


Sodium Chloride 1,000 ml @  400 mls/hr Q2H30M PRN IV PATENCY;  Start 4/9/20 at 0

7:58;  Stop 4/9/20 at 19:57;  Status DC


Info (PHARMACY MONITORING -- do not chart) 1 each PRN DAILY  PRN MC SEE 

COMMENTS;  Start 4/9/20 at 08:00;  Status Cancel


Info (PHARMACY MONITORING -- do not chart) 1 each PRN DAILY  PRN MC SEE 

COMMENTS;  Start 4/9/20 at 08:15;  Status UNV


Sodium Chloride 90 meq/Potassium Phosphate 5 mmol/ Magnesium Sulfate 12 

meq/Calcium Gluconate 15 meq/ Multivitamins 10 ml/Chromium/ Copper/Manganese/ 

Seleni/Zn 0.5 ml/ Insulin Human Regular 30 unit/ Total Parenteral 

Nutrition/Amino Acids/Dextrose/ Fat Emulsion Intravenous 1,400 ml @  58.333 mls/

hr TPN  CONT IV  Last administered on 4/9/20at 22:08;  Start 4/9/20 at 22:00;  

Stop 4/10/20 at 21:59;  Status DC


Linezolid/Dextrose 300 ml @  300 mls/hr Q12HR IV  Last administered on 4/20/20at

20:40;  Start 4/10/20 at 11:00;  Stop 4/21/20 at 08:10;  Status DC


Sodium Chloride 90 meq/Potassium Phosphate 15 mmol/ Magnesium Sulfate 12 

meq/Calcium Gluconate 15 meq/ Multivitamins 10 ml/Chromium/ Copper/Manganese/ 

Seleni/Zn 0.5 ml/ Insulin Human Regular 30 unit/ Total Parenteral 

Nutrition/Amino Acids/Dextrose/ Fat Emulsion Intravenous 1,400 ml @  58.333 mls/

hr TPN  CONT IV  Last administered on 4/10/20at 21:49;  Start 4/10/20 at 22:00; 

Stop 4/11/20 at 21:59;  Status DC


Sodium Chloride 90 meq/Potassium Phosphate 15 mmol/ Magnesium Sulfate 12 

meq/Calcium Gluconate 15 meq/ Multivitamins 10 ml/Chromium/ Copper/Manganese/ 

Seleni/Zn 0.5 ml/ Insulin Human Regular 40 unit/ Total Parenteral 

Nutrition/Amino Acids/Dextrose/ Fat Emulsion Intravenous 1,400 ml @  58.333 mls/

hr TPN  CONT IV  Last administered on 4/11/20at 21:21;  Start 4/11/20 at 22:00; 

Stop 4/12/20 at 21:59;  Status DC


Sodium Chloride 1,000 ml @  1,000 mls/hr Q1H PRN IV hypotension;  Start 4/11/20 

at 13:26;  Stop 4/11/20 at 19:25;  Status DC


Albumin Human 200 ml @  200 mls/hr 1X PRN  PRN IV Hypotension Last administered 

on 4/11/20at 15:00;  Start 4/11/20 at 13:30;  Stop 4/11/20 at 19:29;  Status DC


Sodium Chloride (Normal Saline Flush) 10 ml 1X PRN  PRN IV AP catheter pack;  

Start 4/11/20 at 13:30;  Stop 4/12/20 at 13:29;  Status DC


Sodium Chloride (Normal Saline Flush) 10 ml 1X PRN  PRN IV  catheter pack;  

Start 4/11/20 at 13:30;  Stop 4/12/20 at 13:29;  Status DC


Sodium Chloride 1,000 ml @  400 mls/hr Q2H30M PRN IV PATENCY;  Start 4/11/20 at 

13:26;  Stop 4/12/20 at 01:25;  Status DC


Info (PHARMACY MONITORING -- do not chart) 1 each PRN DAILY  PRN MC SEE 

COMMENTS;  Start 4/11/20 at 13:30;  Stop 4/11/20 at 13:33;  Status DC


Info (PHARMACY MONITORING -- do not chart) 1 each PRN DAILY  PRN MC SEE CO

MMENTS;  Start 4/11/20 at 13:30;  Stop 4/11/20 at 13:34;  Status DC


Sodium Chloride 90 meq/Potassium Phosphate 19 mmol/ Magnesium Sulfate 12 

meq/Calcium Gluconate 15 meq/ Multivitamins 10 ml/Chromium/ Copper/Manganese/ 

Seleni/Zn 0.5 ml/ Insulin Human Regular 40 unit/ Total Parenteral 

Nutrition/Amino Acids/Dextrose/ Fat Emulsion Intravenous 1,400 ml @  58.333 mls/

hr TPN  CONT IV  Last administered on 4/12/20at 21:54;  Start 4/12/20 at 22:00; 

Stop 4/13/20 at 21:59;  Status DC


Sodium Chloride 1,000 ml @  1,000 mls/hr Q1H PRN IV hypotension;  Start 4/13/20 

at 09:35;  Stop 4/13/20 at 15:34;  Status DC


Albumin Human 200 ml @  200 mls/hr 1X PRN  PRN IV Hypotension;  Start 4/13/20 at

09:45;  Stop 4/13/20 at 15:44;  Status DC


Diphenhydramine HCl (Benadryl) 25 mg 1X PRN  PRN IV ITCHING;  Start 4/13/20 at 

09:45;  Stop 4/14/20 at 09:44;  Status DC


Diphenhydramine HCl (Benadryl) 25 mg 1X PRN  PRN IV ITCHING;  Start 4/13/20 at 

09:45;  Stop 4/14/20 at 09:44;  Status DC


Sodium Chloride 1,000 ml @  400 mls/hr Q2H30M PRN IV PATENCY;  Start 4/13/20 at 

09:35;  Stop 4/13/20 at 21:34;  Status DC


Info (PHARMACY MONITORING -- do not chart) 1 each PRN DAILY  PRN MC SEE 

COMMENTS;  Start 4/13/20 at 09:45;  Status Cancel


Sodium Chloride 100 meq/Potassium Phosphate 19 mmol/ Magnesium Sulfate 12 

meq/Calcium Gluconate 15 meq/ Multivitamins 10 ml/Chromium/ Copper/Manganese/ 

Seleni/Zn 0.5 ml/ Insulin Human Regular 40 unit/ Potassium Chloride 20 meq/ 

Total Parenteral Nutrition/Amino Acids/Dextrose/ Fat Emulsion Intravenous 1,400 

ml @  58.333 mls/ hr TPN  CONT IV  Last administered on 4/13/20at 22:02;  Start 

4/13/20 at 22:00;  Stop 4/14/20 at 21:59;  Status DC


Furosemide (Lasix) 40 mg 1X  ONCE IVP  Last administered on 4/13/20at 14:39;  

Start 4/13/20 at 14:30;  Stop 4/13/20 at 14:31;  Status DC


Metronidazole 100 ml @  100 mls/hr Q8HRS IV  Last administered on 4/21/20at 

06:04;  Start 4/14/20 at 10:00;  Stop 4/21/20 at 08:10;  Status DC


Sodium Chloride 1,000 ml @  1,000 mls/hr Q1H PRN IV hypotension;  Start 4/14/20 

at 08:00;  Stop 4/14/20 at 13:59;  Status DC


Albumin Human 200 ml @  200 mls/hr 1X PRN  PRN IV Hypotension;  Start 4/14/20 at

08:00;  Stop 4/14/20 at 13:59;  Status DC


Sodium Chloride 1,000 ml @  400 mls/hr Q2H30M PRN IV PATENCY;  Start 4/14/20 at 

08:00;  Stop 4/14/20 at 19:59;  Status DC


Info (PHARMACY MONITORING -- do not chart) 1 each PRN DAILY  PRN MC SEE 

COMMENTS;  Start 4/14/20 at 11:30;  Status UNV


Info (PHARMACY MONITORING -- do not chart) 1 each PRN DAILY  PRN MC SEE 

COMMENTS;  Start 4/14/20 at 11:30;  Stop 4/16/20 at 12:13;  Status DC


Sodium Chloride 100 meq/Potassium Phosphate 19 mmol/ Magnesium Sulfate 12 

meq/Calcium Gluconate 15 meq/ Multivitamins 10 ml/Chromium/ Copper/Manganese/ 

Seleni/Zn 0.5 ml/ Insulin Human Regular 40 unit/ Potassium Chloride 20 meq/ 

Total Parenteral Nutrition/Amino Acids/Dextrose/ Fat Emulsion Intravenous 1,400 

ml @  58.333 mls/ hr TPN  CONT IV  Last administered on 4/14/20at 21:52;  Start 

4/14/20 at 22:00;  Stop 4/15/20 at 21:59;  Status DC


Sodium Chloride (Normal Saline Flush) 10 ml QSHIFT  PRN IV AFTER MEDS AND BLOOD 

DRAWS;  Start 4/14/20 at 15:00;  Stop 5/12/20 at 11:27;  Status DC


Sodium Chloride (Normal Saline Flush) 10 ml PRN Q5MIN  PRN IV AFTER MEDS AND 

BLOOD DRAWS;  Start 4/14/20 at 15:00


Sodium Chloride (Normal Saline Flush) 20 ml PRN Q5MIN  PRN IV AFTER MEDS AND 

BLOOD DRAWS;  Start 4/14/20 at 15:00


Sodium Chloride 100 meq/Potassium Phosphate 19 mmol/ Magnesium Sulfate 12 

meq/Calcium Gluconate 15 meq/ Multivitamins 10 ml/Chromium/ Copper/Manganese/ 

Seleni/Zn 0.5 ml/ Insulin Human Regular 40 unit/ Potassium Chloride 20 meq/ 

Total Parenteral Nutrition/Amino Acids/Dextrose/ Fat Emulsion Intravenous 1,400 

ml @  58.333 mls/ hr TPN  CONT IV  Last administered on 4/15/20at 21:20;  Start 

4/15/20 at 22:00;  Stop 4/16/20 at 21:59;  Status DC


Lidocaine HCl (Buffered Lidocaine 1%) 3 ml STK-MED ONCE .ROUTE ;  Start 4/15/20 

at 13:16;  Stop 4/15/20 at 13:16;  Status DC


Lidocaine HCl (Buffered Lidocaine 1%) 6 ml 1X  ONCE INJ  Last administered on 

4/15/20at 13:45;  Start 4/15/20 at 13:30;  Stop 4/15/20 at 13:31;  Status DC


Albumin Human 100 ml @  100 mls/hr 1X  ONCE IV  Last administered on 4/15/20at 

15:41;  Start 4/15/20 at 15:00;  Stop 4/15/20 at 15:59;  Status DC


Albumin Human 50 ml @ 50 mls/hr 1X  ONCE IV  Last administered on 4/15/20at 

15:00;  Start 4/15/20 at 15:00;  Stop 4/15/20 at 15:59;  Status DC


Info (PHARMACY MONITORING -- do not chart) 1 each PRN DAILY  PRN MC SEE 

COMMENTS;  Start 4/16/20 at 11:30;  Status Cancel


Info (PHARMACY MONITORING -- do not chart) 1 each PRN DAILY  PRN MC SEE 

COMMENTS;  Start 4/16/20 at 11:30;  Status UNV


Sodium Chloride 100 meq/Potassium Phosphate 10 mmol/ Magnesium Sulfate 12 

meq/Calcium Gluconate 15 meq/ Multivitamins 10 ml/Chromium/ Copper/Manganese/ 

Seleni/Zn 0.5 ml/ Insulin Human Regular 35 unit/ Potassium Chloride 20 meq/ To

chely Parenteral Nutrition/Amino Acids/Dextrose/ Fat Emulsion Intravenous 1,400 ml

@  58.333 mls/ hr TPN  CONT IV  Last administered on 4/16/20at 22:10;  Start 

4/16/20 at 22:00;  Stop 4/17/20 at 21:59;  Status DC


Sodium Chloride 100 meq/Potassium Phosphate 5 mmol/ Magnesium Sulfate 12 

meq/Calcium Gluconate 15 meq/ Multivitamins 10 ml/Chromium/ Copper/Manganese/ 

Seleni/Zn 0.5 ml/ Insulin Human Regular 35 unit/ Potassium Chloride 20 meq/ 

Total Parenteral Nutrition/Amino Acids/Dextrose/ Fat Emulsion Intravenous 1,400 

ml @  58.333 mls/ hr TPN  CONT IV  Last administered on 4/17/20at 22:59;  Start 

4/17/20 at 22:00;  Stop 4/18/20 at 21:59;  Status DC


Sodium Chloride 1,000 ml @  1,000 mls/hr Q1H PRN IV hypotension;  Start 4/18/20 

at 08:27;  Stop 4/18/20 at 14:26;  Status DC


Albumin Human 200 ml @  200 mls/hr 1X PRN  PRN IV Hypotension Last administered 

on 4/18/20at 09:18;  Start 4/18/20 at 08:30;  Stop 4/18/20 at 14:29;  Status DC


Sodium Chloride 1,000 ml @  400 mls/hr Q2H30M PRN IV PATENCY;  Start 4/18/20 at 

08:27;  Stop 4/18/20 at 20:26;  Status DC


Info (PHARMACY MONITORING -- do not chart) 1 each PRN DAILY  PRN MC SEE 

COMMENTS;  Start 4/18/20 at 08:30;  Status Cancel


Info (PHARMACY MONITORING -- do not chart) 1 each PRN DAILY  PRN MC SEE 

COMMENTS;  Start 4/18/20 at 08:30;  Stop 4/26/20 at 13:10;  Status DC


Sodium Chloride 100 meq/Potassium Chloride 40 meq/ Magnesium Sulfate 15 

meq/Calcium Gluconate 15 meq/ Multivitamins 10 ml/Chromium/ Copper/Manganese/ 

Seleni/Zn 0.5 ml/ Insulin Human Regular 35 unit/ Total Parenteral 

Nutrition/Amino Acids/Dextrose/ Fat Emulsion Intravenous 1,400 ml @  58.333 mls/

hr TPN  CONT IV  Last administered on 4/18/20at 22:00;  Start 4/18/20 at 22:00; 

Stop 4/19/20 at 21:59;  Status DC


Potassium Chloride/Water 100 ml @  100 mls/hr 1X  ONCE IV  Last administered on 

4/18/20at 17:28;  Start 4/18/20 at 14:45;  Stop 4/18/20 at 15:44;  Status DC


Sodium Chloride 100 meq/Potassium Chloride 40 meq/ Magnesium Sulfate 15 

meq/Calcium Gluconate 15 meq/ Multivitamins 10 ml/Chromium/ Copper/Manganese/ 

Seleni/Zn 0.5 ml/ Insulin Human Regular 35 unit/ Total Parenteral 

Nutrition/Amino Acids/Dextrose/ Fat Emulsion Intravenous 1,400 ml @  58.333 mls/

hr TPN  CONT IV  Last administered on 4/19/20at 22:46;  Start 4/19/20 at 22:00; 

Stop 4/20/20 at 21:59;  Status DC


Sodium Chloride 100 meq/Potassium Chloride 40 meq/ Magnesium Sulfate 20 

meq/Calcium Gluconate 15 meq/ Multivitamins 10 ml/Chromium/ Copper/Manganese/ 

Seleni/Zn 0.5 ml/ Insulin Human Regular 35 unit/ Total Parenteral 

Nutrition/Amino Acids/Dextrose/ Fat Emulsion Intravenous 1,400 ml @  58.333 mls/

hr TPN  CONT IV  Last administered on 4/20/20at 22:31;  Start 4/20/20 at 22:00; 

Stop 4/21/20 at 21:59;  Status DC


Fentanyl Citrate (Fentanyl 2ml Vial) 50 mcg PRN Q2HR  PRN IVP PAIN Last 

administered on 4/27/20at 13:32;  Start 4/20/20 at 21:00;  Stop 4/28/20 at 

12:53;  Status DC


Fentanyl Citrate (Fentanyl 2ml Vial) 25 mcg PRN Q2HR  PRN IVP PAIN;  Start 

4/20/20 at 21:00;  Stop 4/28/20 at 12:54;  Status DC


Enoxaparin Sodium (Lovenox 100mg Syringe) 100 mg Q12HR SQ ;  Start 4/21/20 at 

21:00;  Status UNV


Amino Acids/ Glycerin/ Electrolytes 1,000 ml @  75 mls/hr [id number] IV ;  Start 

4/20/20 at 21:15;  Status UNV


Sodium Chloride 1,000 ml @  1,000 mls/hr Q1H PRN IV hypotension;  Start 4/21/20 

at 07:56;  Stop 4/21/20 at 13:55;  Status DC


Albumin Human 200 ml @  200 mls/hr 1X PRN  PRN IV Hypotension Last administered 

on 4/21/20at 08:40;  Start 4/21/20 at 08:00;  Stop 4/21/20 at 13:59;  Status DC


Sodium Chloride 1,000 ml @  400 mls/hr Q2H30M PRN IV PATENCY;  Start 4/21/20 at 

07:56;  Stop 4/21/20 at 19:55;  Status DC


Info (PHARMACY MONITORING -- do not chart) 1 each PRN DAILY  PRN MC SEE 

COMMENTS;  Start 4/21/20 at 08:00;  Status UNV


Info (PHARMACY MONITORING -- do not chart) 1 each PRN DAILY  PRN MC SEE 

COMMENTS;  Start 4/21/20 at 08:00;  Status UNV


Daptomycin 430 mg/ Sodium Chloride 50 ml @  100 mls/hr Q24H IV  Last 

administered on 4/21/20at 12:35;  Start 4/21/20 at 09:00;  Stop 4/21/20 at 

12:49;  Status DC


Sodium Chloride 100 meq/Potassium Chloride 40 meq/ Magnesium Sulfate 20 meq/

Calcium Gluconate 15 meq/ Multivitamins 10 ml/Chromium/ Copper/Manganese/ 

Seleni/Zn 0.5 ml/ Insulin Human Regular 35 unit/ Total Parenteral 

Nutrition/Amino Acids/Dextrose/ Fat Emulsion Intravenous 1,400 ml @  58.333 mls/

hr TPN  CONT IV  Last administered on 4/21/20at 21:26;  Start 4/21/20 at 22:00; 

Stop 4/22/20 at 21:59;  Status DC


Daptomycin 430 mg/ Sodium Chloride 50 ml @  100 mls/hr Q48H IV ;  Start 4/23/20 

at 09:00;  Stop 4/22/20 at 11:55;  Status DC


Sodium Chloride 100 meq/Potassium Chloride 40 meq/ Magnesium Sulfate 20 

meq/Calcium Gluconate 15 meq/ Multivitamins 10 ml/Chromium/ Copper/Manganese/ 

Seleni/Zn 0.5 ml/ Insulin Human Regular 35 unit/ Total Parenteral 

Nutrition/Amino Acids/Dextrose/ Fat Emulsion Intravenous 1,400 ml @  58.333 mls/

hr TPN  CONT IV  Last administered on 4/22/20at 22:27;  Start 4/22/20 at 22:00; 

Stop 4/23/20 at 21:59;  Status DC


Daptomycin 430 mg/ Sodium Chloride 50 ml @  100 mls/hr Q24H IV  Last 

administered on 4/24/20at 15:07;  Start 4/22/20 at 13:00;  Stop 4/25/20 at 

13:15;  Status DC


Sodium Chloride 100 meq/Potassium Chloride 40 meq/ Magnesium Sulfate 20 

meq/Calcium Gluconate 10 meq/ Multivitamins 10 ml/Chromium/ Copper/Manganese/ 

Seleni/Zn 0.5 ml/ Insulin Human Regular 35 unit/ Total Parenteral 

Nutrition/Amino Acids/Dextrose/ Fat Emulsion Intravenous 1,400 ml @  58.333 mls/

hr TPN  CONT IV  Last administered on 4/24/20at 00:06;  Start 4/23/20 at 22:00; 

Stop 4/24/20 at 21:59;  Status DC


Alteplase, Recombinant (Cathflo For Central Catheter Clearance) 1 mg 1X  ONCE 

INT CAT  Last administered on 4/24/20at 11:44;  Start 4/24/20 at 10:45;  Stop 

4/24/20 at 10:46;  Status DC


Ondansetron HCl (Zofran) 4 mg PRN Q6HRS  PRN IV NAUSEA/VOMITING;  Start 4/27/20 

at 07:00;  Stop 4/28/20 at 06:59;  Status DC


Fentanyl Citrate (Fentanyl 2ml Vial) 25 mcg PRN Q5MIN  PRN IV MILD PAIN 1-3;  

Start 4/27/20 at 07:00;  Stop 4/28/20 at 06:59;  Status DC


Fentanyl Citrate (Fentanyl 2ml Vial) 50 mcg PRN Q5MIN  PRN IV MODERATE TO SEVERE

PAIN Last administered on 4/27/20at 10:17;  Start 4/27/20 at 07:00;  Stop 

4/28/20 at 06:59;  Status DC


Ringer's Solution 1,000 ml @  30 mls/hr Q24H IV ;  Start 4/27/20 at 07:00;  Stop

4/27/20 at 18:59;  Status DC


Lidocaine HCl (Xylocaine-Mpf 1% 2ml Vial) 2 ml PRN 1X  PRN ID PRIOR TO IV START;

 Start 4/27/20 at 07:00;  Stop 4/28/20 at 06:59;  Status DC


Prochlorperazine Edisylate (Compazine) 5 mg PACU PRN  PRN IV NAUSEA, MRX1;  

Start 4/27/20 at 07:00;  Stop 4/28/20 at 06:59;  Status DC


Sodium Acetate 50 meq/Potassium Acetate 55 meq/ Magnesium Sulfate 20 meq/Calcium

Gluconate 10 meq/ Multivitamins 10 ml/Chromium/ Copper/Manganese/ Seleni/Zn 0.5 

ml/ Insulin Human Regular 35 unit/ Total Parenteral Nutrition/Amino 

Acids/Dextrose/ Fat Emulsion Intravenous 1,400 ml @  58.333 mls/ hr TPN  CONT IV

;  Start 4/24/20 at 22:00;  Stop 4/24/20 at 14:15;  Status DC


Sodium Acetate 50 meq/Potassium Acetate 55 meq/ Magnesium Sulfate 20 meq/Calcium

Gluconate 10 meq/ Multivitamins 10 ml/Chromium/ Copper/Manganese/ Seleni/Zn 0.5 

ml/ Insulin Human Regular 35 unit/ Total Parenteral Nutrition/Amino Acids/D

extrose/ Fat Emulsion Intravenous 1,800 ml @  75 mls/hr TPN  CONT IV  Last 

administered on 4/24/20at 22:38;  Start 4/24/20 at 22:00;  Stop 4/25/20 at 

21:59;  Status DC


Sodium Chloride 1,000 ml @  1,000 mls/hr Q1H PRN IV hypotension;  Start 4/24/20 

at 15:31;  Stop 4/24/20 at 21:30;  Status DC


Diphenhydramine HCl (Benadryl) 25 mg 1X PRN  PRN IV ITCHING;  Start 4/24/20 at 

15:45;  Stop 4/25/20 at 15:44;  Status DC


Diphenhydramine HCl (Benadryl) 25 mg 1X PRN  PRN IV ITCHING;  Start 4/24/20 at 

15:45;  Stop 4/25/20 at 15:44;  Status DC


Sodium Chloride 1,000 ml @  400 mls/hr Q2H30M PRN IV PATENCY;  Start 4/24/20 at 

15:31;  Stop 4/25/20 at 03:30;  Status DC


Info (PHARMACY MONITORING -- do not chart) 1 each PRN DAILY  PRN MC SEE 

COMMENTS;  Start 4/24/20 at 15:45;  Stop 5/26/20 at 14:14;  Status DC


Sodium Acetate 50 meq/Potassium Acetate 55 meq/ Magnesium Sulfate 20 meq/Calcium

Gluconate 10 meq/ Multivitamins 10 ml/Chromium/ Copper/Manganese/ Seleni/Zn 0.5 

ml/ Insulin Human Regular 35 unit/ Total Parenteral Nutrition/Amino 

Acids/Dextrose/ Fat Emulsion Intravenous 1,800 ml @  75 mls/hr TPN  CONT IV  

Last administered on 4/25/20at 22:03;  Start 4/25/20 at 22:00;  Stop 4/26/20 at 

21:59;  Status DC


Daptomycin 430 mg/ Sodium Chloride 50 ml @  100 mls/hr Q24H IV  Last administ

ered on 4/30/20at 13:00;  Start 4/25/20 at 13:00;  Stop 4/30/20 at 20:58;  

Status DC


Heparin Sodium (Porcine) 1000 unit/Sodium Chloride 1,001 ml @  1,001 mls/hr 1X  

ONCE IRR ;  Start 4/27/20 at 06:00;  Stop 4/27/20 at 06:59;  Status DC


Potassium Acetate 55 meq/Magnesium Sulfate 20 meq/ Calcium Gluconate 10 meq/ 

Multivitamins 10 ml/Chromium/ Copper/Manganese/ Seleni/Zn 0.5 ml/ Insulin Human 

Regular 35 unit/ Total Parenteral Nutrition/Amino Acids/Dextrose/ Fat Emulsion 

Intravenous 1,920 ml @  80 mls/hr TPN  CONT IV  Last administered on 4/26/20at 

22:10;  Start 4/26/20 at 22:00;  Stop 4/27/20 at 21:59;  Status DC


Dexamethasone Sodium Phosphate (Decadron) 4 mg STK-MED ONCE .ROUTE ;  Start 

4/27/20 at 10:56;  Stop 4/27/20 at 10:57;  Status DC


Ondansetron HCl (Zofran) 4 mg STK-MED ONCE .ROUTE ;  Start 4/27/20 at 10:56;  

Stop 4/27/20 at 10:57;  Status DC


Rocuronium Bromide (Zemuron) 50 mg STK-MED ONCE .ROUTE ;  Start 4/27/20 at 

10:56;  Stop 4/27/20 at 10:57;  Status DC


Fentanyl Citrate (Fentanyl 2ml Vial) 100 mcg STK-MED ONCE .ROUTE ;  Start 

4/27/20 at 10:56;  Stop 4/27/20 at 10:57;  Status DC


Bupivacaine HCl/ Epinephrine Bitart (Sensorcain-Epi 0.5%-1:[id number] Mpf) 30 ml 

STK-MED ONCE .ROUTE  Last administered on 4/27/20at 12:01;  Start 4/27/20 at 

10:58;  Stop 4/27/20 at 10:58;  Status DC


Cellulose (Surgicel Hemostat 2x14) 1 each STK-MED ONCE .ROUTE ;  Start 4/27/20 

at 10:58;  Stop 4/27/20 at 10:59;  Status DC


Iohexol (Omnipaque 300 Mg/ml) 50 ml STK-MED ONCE .ROUTE ;  Start 4/27/20 at 

10:58;  Stop 4/27/20 at 10:59;  Status DC


Cellulose (Surgicel Hemostat 4x8) 1 each STK-MED ONCE .ROUTE ;  Start 4/27/20 at

10:58;  Stop 4/27/20 at 10:59;  Status DC


Bisacodyl (Dulcolax Supp) 10 mg STK-MED ONCE .ROUTE ;  Start 4/27/20 at 10:59;  

Stop 4/27/20 at 10:59;  Status DC


Heparin Sodium (Porcine) 1000 unit/Sodium Chloride 1,001 ml @  1,001 mls/hr 1X  

ONCE IRR ;  Start 4/27/20 at 12:00;  Stop 4/27/20 at 12:59;  Status DC


Propofol 20 ml @ As Directed STK-MED ONCE IV ;  Start 4/27/20 at 11:05;  Stop 

4/27/20 at 11:05;  Status DC


Sevoflurane (Ultane) 90 ml STK-MED ONCE IH ;  Start 4/27/20 at 11:05;  Stop 

4/27/20 at 11:05;  Status DC


Sevoflurane (Ultane) 60 ml STK-MED ONCE IH ;  Start 4/27/20 at 12:26;  Stop 

4/27/20 at 12:27;  Status DC


Propofol 20 ml @ As Directed STK-MED ONCE IV ;  Start 4/27/20 at 12:26;  Stop 

4/27/20 at 12:27;  Status DC


Phenylephrine HCl (PHENYLEPHRINE in 0.9% NACL PF) 1 mg STK-MED ONCE IV ;  Start 

4/27/20 at 12:34;  Stop 4/27/20 at 12:34;  Status DC


Heparin Sodium (Porcine) (Heparin Sodium) 5,000 unit Q12HR SQ  Last administered

on 5/6/20at 20:57;  Start 4/27/20 at 21:00;  Stop 5/7/20 at 09:59;  Status DC


Sodium Chloride (Normal Saline Flush) 3 ml QSHIFT  PRN IV AFTER MEDS AND BLOOD 

DRAWS;  Start 4/27/20 at 13:45


Naloxone HCl (Narcan) 0.4 mg PRN Q2MIN  PRN IV SEE INSTRUCTIONS Last 

administered on 6/6/20at 15:15;  Start 4/27/20 at 13:45


Sodium Chloride 1,000 ml @  25 mls/hr Q24H IV  Last administered on 5/26/20at 

13:37;  Start 4/27/20 at 13:37;  Stop 5/29/20 at 13:09;  Status DC


Naloxone HCl (Narcan) 0.4 mg PRN Q2MIN  PRN IV SEE INSTRUCTIONS;  Start 4/27/20 

at 14:30;  Status UNV


Sodium Chloride 1,000 ml @  25 mls/hr Q24H IV ;  Start 4/27/20 at 14:30;  Status

UNV


Hydromorphone HCl 30 ml @ 0 mls/hr CONT PRN  PRN IV PER PROTOCOL Last 

administered on 5/2/20at 16:08;  Start 4/27/20 at 14:30;  Stop 5/4/20 at 08:55; 

Status DC


Potassium Acetate 55 meq/Magnesium Sulfate 20 meq/ Calcium Gluconate 10 meq/ 

Multivitamins 10 ml/Chromium/ Copper/Manganese/ Seleni/Zn 0.5 ml/ Insulin Human 

Regular 35 unit/ Total Parenteral Nutrition/Amino Acids/Dextrose/ Fat Emulsion 

Intravenous 1,920 ml @  80 mls/hr TPN  CONT IV  Last administered on 4/27/20at 

22:01;  Start 4/27/20 at 22:00;  Stop 4/28/20 at 21:59;  Status DC


Bumetanide (Bumex) 2 mg BID92 IV  Last administered on 5/1/20at 13:50;  Start 

4/28/20 at 14:00;  Stop 5/2/20 at 14:10;  Status DC


Meropenem 1 gm/ Sodium Chloride 100 ml @  200 mls/hr Q8HRS IV  Last administered

on 5/22/20at 05:53;  Start 4/28/20 at 14:00;  Stop 5/22/20 at 09:31;  Status DC


Potassium Acetate 55 meq/Magnesium Sulfate 20 meq/ Calcium Gluconate 10 meq/ 

Multivitamins 10 ml/Chromium/ Copper/Manganese/ Seleni/Zn 0.5 ml/ Insulin Human 

Regular 35 unit/ Total Parenteral Nutrition/Amino Acids/Dextrose/ Fat Emulsion 

Intravenous 1,920 ml @  80 mls/hr TPN  CONT IV  Last administered on 4/28/20at 

22:02;  Start 4/28/20 at 22:00;  Stop 4/29/20 at 21:59;  Status DC


Hydromorphone HCl (Dilaudid Standard PCA) 12 mg STK-MED ONCE IV ;  Start 4/27/20

at 14:35;  Stop 4/28/20 at 13:53;  Status DC


Artificial Tears (Artificial Tears) 1 drop PRN Q15MIN  PRN OU DRY EYE Last 

administered on 5/29/20at 10:08;  Start 4/29/20 at 05:30


Hydromorphone HCl (Dilaudid Standard PCA) 12 mg STK-MED ONCE IV ;  Start 4/28/20

at 12:05;  Stop 4/29/20 at 09:15;  Status DC


Potassium Acetate 65 meq/Magnesium Sulfate 20 meq/ Calcium Gluconate 10 meq/ 

Multivitamins 10 ml/Chromium/ Copper/Manganese/ Seleni/Zn 0.5 ml/ Insulin Human 

Regular 30 unit/ Total Parenteral Nutrition/Amino Acids/Dextrose/ Fat Emulsion 

Intravenous 1,920 ml @  80 mls/hr TPN  CONT IV  Last administered on 4/29/20at 

22:22;  Start 4/29/20 at 22:00;  Stop 4/30/20 at 21:59;  Status DC


Cyclobenzaprine HCl (Flexeril) 10 mg PRN Q6HRS  PRN PO MUSCLE SPASMS;  Start 

4/30/20 at 10:45


Potassium Acetate 55 meq/Magnesium Sulfate 20 meq/ Calcium Gluconate 10 meq/ 

Multivitamins 10 ml/Chromium/ Copper/Manganese/ Seleni/Zn 0.5 ml/ Insulin Human 

Regular 30 unit/ Total Parenteral Nutrition/Amino Acids/Dextrose/ Fat Emulsion 

Intravenous 1,920 ml @  80 mls/hr TPN  CONT IV  Last administered on 5/1/20at 

01:00;  Start 4/30/20 at 22:00;  Stop 5/1/20 at 21:59;  Status DC


Magnesium Sulfate 50 ml @ 25 mls/hr 1X  ONCE IV  Last administered on 4/30/20at 

17:18;  Start 4/30/20 at 12:45;  Stop 4/30/20 at 14:44;  Status DC


Potassium Chloride/Water 100 ml @  100 mls/hr 1X  ONCE IV  Last administered on 

5/1/20at 11:27;  Start 5/1/20 at 12:00;  Stop 5/1/20 at 12:59;  Status DC


Hydromorphone HCl (Dilaudid Standard PCA) 12 mg STK-MED ONCE IV ;  Start 4/29/20

at 10:50;  Stop 5/1/20 at 11:02;  Status DC


Hydromorphone HCl (Dilaudid Standard PCA) 12 mg STK-MED ONCE IV ;  Start 4/30/20

at 13:47;  Stop 5/1/20 at 11:03;  Status DC


Potassium Acetate 30 meq/Magnesium Sulfate 20 meq/ Calcium Gluconate 10 meq/ 

Multivitamins 10 ml/Chromium/ Copper/Manganese/ Seleni/Zn 0.5 ml/ Insulin Human 

Regular 30 unit/ Potassium Chloride 30 meq/ Total Parenteral Nutrition/Amino 

Acids/Dextrose/ Fat Emulsion Intravenous 1,920 ml @  80 mls/hr TPN  CONT IV  

Last administered on 5/1/20at 22:34;  Start 5/1/20 at 22:00;  Stop 5/2/20 at 

21:59;  Status DC


Potassium Chloride/Water 100 ml @  100 mls/hr Q1H IV  Last administered on 

5/2/20at 13:05;  Start 5/2/20 at 07:00;  Stop 5/2/20 at 10:59;  Status DC


Magnesium Sulfate 50 ml @ 25 mls/hr 1X  ONCE IV  Last administered on 5/2/20at 

10:34;  Start 5/2/20 at 10:30;  Stop 5/2/20 at 12:29;  Status DC


Potassium Chloride 75 meq/ Magnesium Sulfate 20 meq/Calcium Gluconate 10 meq/ 

Multivitamins 10 ml/Chromium/ Copper/Manganese/ Seleni/Zn 0.5 ml/ Insulin Human 

Regular 30 unit/ Total Parenteral Nutrition/Amino Acids/Dextrose/ Fat Emulsion 

Intravenous 1,920 ml @  80 mls/hr TPN  CONT IV  Last administered on 5/2/20at 

21:51;  Start 5/2/20 at 22:00;  Stop 5/3/20 at 22:00;  Status DC


Potassium Chloride 75 meq/ Magnesium Sulfate 20 meq/Calcium Gluconate 10 meq/ 

Multivitamins 10 ml/Chromium/ Copper/Manganese/ Seleni/Zn 0.5 ml/ Insulin Human 

Regular 25 unit/ Total Parenteral Nutrition/Amino Acids/Dextrose/ Fat Emulsion 

Intravenous 1,920 ml @  80 mls/hr TPN  CONT IV  Last administered on 5/3/20at 

22:04;  Start 5/3/20 at 22:00;  Stop 5/4/20 at 21:59;  Status DC


Hydromorphone HCl (Dilaudid) 0.4 mg PRN Q4HRS  PRN IVP PAIN Last administered on

5/4/20at 10:57;  Start 5/4/20 at 09:00;  Stop 5/4/20 at 18:59;  Status DC


Micafungin Sodium 100 mg/Dextrose 100 ml @  100 mls/hr Q24H IV  Last 

administered on 5/26/20at 12:17;  Start 5/4/20 at 11:00;  Stop 5/27/20 at 09:59;

 Status DC


Daptomycin 485 mg/ Sodium Chloride 50 ml @  100 mls/hr Q24H IV  Last a

dministered on 5/11/20at 13:10;  Start 5/4/20 at 11:00;  Stop 5/12/20 at 07:44; 

Status DC


Potassium Chloride 75 meq/ Magnesium Sulfate 15 meq/Calcium Gluconate 8 meq/ 

Multivitamins 10 ml/Chromium/ Copper/Manganese/ Seleni/Zn 0.5 ml/ Insulin Human 

Regular 25 unit/ Total Parenteral Nutrition/Amino Acids/Dextrose/ Fat Emulsion 

Intravenous 1,920 ml @  80 mls/hr TPN  CONT IV  Last administered on 5/4/20at 

23:08;  Start 5/4/20 at 22:00;  Stop 5/5/20 at 21:59;  Status DC


Haloperidol Lactate (Haldol Inj) 3 mg 1X  ONCE IVP  Last administered on 

5/4/20at 14:37;  Start 5/4/20 at 14:30;  Stop 5/4/20 at 14:31;  Status DC


Hydromorphone HCl (Dilaudid) 1 mg PRN Q4HRS  PRN IVP PAIN Last administered on 

5/18/20at 06:25;  Start 5/4/20 at 19:00;  Stop 5/18/20 at 17:10;  Status DC


Potassium Chloride 75 meq/ Magnesium Sulfate 15 meq/Calcium Gluconate 8 meq/ 

Multivitamins 10 ml/Chromium/ Copper/Manganese/ Seleni/Zn 0.5 ml/ Insulin Human 

Regular 20 unit/ Total Parenteral Nutrition/Amino Acids/Dextrose/ Fat Emulsion 

Intravenous 1,920 ml @  80 mls/hr TPN  CONT IV  Last administered on 5/5/20at 

22:10;  Start 5/5/20 at 22:00;  Stop 5/6/20 at 21:59;  Status DC


Lidocaine HCl (Buffered Lidocaine 1%) 3 ml STK-MED ONCE .ROUTE ;  Start 5/6/20 

at 11:31;  Stop 5/6/20 at 11:31;  Status DC


Lidocaine HCl (Buffered Lidocaine 1%) 3 ml STK-MED ONCE .ROUTE ;  Start 5/6/20 

at 12:28;  Stop 5/6/20 at 12:29;  Status DC


Lidocaine HCl (Buffered Lidocaine 1%) 6 ml 1X  ONCE INJ  Last administered on 

5/6/20at 12:53;  Start 5/6/20 at 12:45;  Stop 5/6/20 at 12:46;  Status DC


Potassium Chloride 75 meq/ Magnesium Sulfate 15 meq/Calcium Gluconate 8 meq/ 

Multivitamins 10 ml/Chromium/ Copper/Manganese/ Seleni/Zn 0.5 ml/ Insulin Human 

Regular 20 unit/ Total Parenteral Nutrition/Amino Acids/Dextrose/ Fat Emulsion 

Intravenous 1,920 ml @  80 mls/hr TPN  CONT IV  Last administered on 5/6/20at 

22:00;  Start 5/6/20 at 22:00;  Stop 5/7/20 at 21:59;  Status DC


Potassium Chloride 75 meq/ Magnesium Sulfate 15 meq/Calcium Gluconate 8 meq/ 

Multivitamins 10 ml/Chromium/ Copper/Manganese/ Seleni/Zn 0.5 ml/ Insulin Human 

Regular 15 unit/ Total Parenteral Nutrition/Amino Acids/Dextrose/ Fat Emulsion 

Intravenous 1,920 ml @  80 mls/hr TPN  CONT IV  Last administered on 5/7/20at 

22:28;  Start 5/7/20 at 22:00;  Stop 5/8/20 at 21:59;  Status DC


Vecuronium Bromide (Norcuron Bolus) 6 mg PRN Q6HRS  PRN IV VENT ASYNCHRONY;  

Start 5/7/20 at 19:15;  Stop 5/7/20 at 19:35;  Status DC


Bumetanide (Bumex) 2 mg 1X  ONCE IV  Last administered on 5/7/20at 22:09;  Start

5/7/20 at 19:45;  Stop 5/7/20 at 19:46;  Status DC


Lidocaine HCl (Buffered Lidocaine 1%) 3 ml STK-MED ONCE .ROUTE ;  Start 5/8/20 

at 07:59;  Stop 5/8/20 at 07:59;  Status DC


Midazolam HCl (Versed) 5 mg STK-MED ONCE .ROUTE ;  Start 5/8/20 at 08:36;  Stop 

5/8/20 at 08:36;  Status DC


Fentanyl Citrate (Fentanyl 5ml Vial) 250 mcg STK-MED ONCE .ROUTE ;  Start 5/8/20

at 08:36;  Stop 5/8/20 at 08:37;  Status DC


Lidocaine HCl (Buffered Lidocaine 1%) 3 ml 1X  ONCE IJ  Last administered on 

5/8/20at 09:30;  Start 5/8/20 at 09:15;  Stop 5/8/20 at 09:16;  Status DC


Midazolam HCl (Versed) 5 mg 1X  ONCE IV  Last administered on 5/8/20at 09:30;  

Start 5/8/20 at 09:15;  Stop 5/8/20 at 09:16;  Status DC


Fentanyl Citrate (Fentanyl 5ml Vial) 250 mcg 1X  ONCE IV  Last administered on 

5/8/20at 09:30;  Start 5/8/20 at 09:15;  Stop 5/8/20 at 09:16;  Status DC


Bumetanide (Bumex) 2 mg DAILY IV  Last administered on 5/18/20at 08:07;  Start 

5/8/20 at 10:00;  Stop 5/18/20 at 17:15;  Status DC


Potassium Chloride 75 meq/ Magnesium Sulfate 15 meq/ Multivitamins 10 

ml/Chromium/ Copper/Manganese/ Seleni/Zn 0.5 ml/ Insulin Human Regular 15 unit/ 

Total Parenteral Nutrition/Amino Acids/Dextrose/ Fat Emulsion Intravenous 1,920 

ml @  80 mls/hr TPN  CONT IV  Last administered on 5/8/20at 21:59;  Start 5/8/20

at 22:00;  Stop 5/9/20 at 21:59;  Status DC


Metoclopramide HCl (Reglan Vial) 10 mg PRN Q3HRS  PRN IVP NAUSEA/VOMITING-3rd 

choice Last administered on 5/14/20at 04:25;  Start 5/9/20 at 16:45


Potassium Chloride 75 meq/ Magnesium Sulfate 15 meq/ Multivitamins 10 

ml/Chromium/ Copper/Manganese/ Seleni/Zn 0.5 ml/ Insulin Human Regular 15 unit/ 

Total Parenteral Nutrition/Amino Acids/Dextrose/ Fat Emulsion Intravenous 1,920 

ml @  80 mls/hr TPN  CONT IV  Last administered on 5/9/20at 22:41;  Start 5/9/20

at 22:00;  Stop 5/10/20 at 21:59;  Status DC


Magnesium Sulfate 50 ml @ 25 mls/hr 1X  ONCE IV  Last administered on 5/10/20at 

10:44;  Start 5/10/20 at 09:00;  Stop 5/10/20 at 10:59;  Status DC


Potassium Chloride/Water 100 ml @  100 mls/hr 1X  ONCE IV  Last administered on 

5/10/20at 09:37;  Start 5/10/20 at 09:00;  Stop 5/10/20 at 09:59;  Status DC


Duloxetine HCl (Cymbalta) 30 mg DAILY PO  Last administered on 5/11/20at 09:48; 

Start 5/10/20 at 14:00;  Stop 5/13/20 at 10:25;  Status DC


Potassium Chloride 80 meq/ Magnesium Sulfate 20 meq/ Multivitamins 10 

ml/Chromium/ Copper/Manganese/ Seleni/Zn 0.5 ml/ Insulin Human Regular 15 unit/ 

Total Parenteral Nutrition/Amino Acids/Dextrose/ Fat Emulsion Intravenous 1,920 

ml @  80 mls/hr TPN  CONT IV  Last administered on 5/10/20at 21:42;  Start 

5/10/20 at 22:00;  Stop 5/11/20 at 21:59;  Status DC


Potassium Chloride 80 meq/ Magnesium Sulfate 20 meq/ Multivitamins 10 

ml/Chromium/ Copper/Manganese/ Seleni/Zn 0.5 ml/ Insulin Human Regular 15 unit/ 

Total Parenteral Nutrition/Amino Acids/Dextrose/ Fat Emulsion Intravenous 1,920 

ml @  80 mls/hr TPN  CONT IV  Last administered on 5/11/20at 22:20;  Start 

5/11/20 at 22:00;  Stop 5/12/20 at 21:59;  Status DC


Lidocaine HCl (Buffered Lidocaine 1%) 3 ml STK-MED ONCE .ROUTE ;  Start 5/12/20 

at 09:54;  Stop 5/12/20 at 09:55;  Status DC


Hydromorphone HCl (Dilaudid Standard PCA) 12 mg STK-MED ONCE IV ;  Start 5/1/20 

at 15:50;  Stop 5/12/20 at 11:24;  Status DC


Potassium Chloride 80 meq/ Magnesium Sulfate 20 meq/ Multivitamins 10 

ml/Chromium/ Copper/Manganese/ Seleni/Zn 0.5 ml/ Insulin Human Regular 15 unit/ 

Total Parenteral Nutrition/Amino Acids/Dextrose/ Fat Emulsion Intravenous 1,920 

ml @  80 mls/hr TPN  CONT IV  Last administered on 5/12/20at 21:40;  Start 

5/12/20 at 22:00;  Stop 5/13/20 at 21:59;  Status DC


Lidocaine HCl (Buffered Lidocaine 1%) 6 ml 1X  ONCE INJ  Last administered on 

5/12/20at 14:15;  Start 5/12/20 at 14:15;  Stop 5/12/20 at 14:16;  Status DC


Potassium Chloride 80 meq/ Magnesium Sulfate 20 meq/ Multivitamins 10 

ml/Chromium/ Copper/Manganese/ Seleni/Zn 1 ml/ Insulin Human Regular 15 unit/ 

Total Parenteral Nutrition/Amino Acids/Dextrose/ Fat Emulsion Intravenous 1,920 

ml @  80 mls/hr TPN  CONT IV  Last administered on 5/13/20at 22:04;  Start 

5/13/20 at 22:00;  Stop 5/14/20 at 21:59;  Status DC


Potassium Chloride/Water 100 ml @  100 mls/hr 1X  ONCE IV  Last administered on 

5/14/20at 11:34;  Start 5/14/20 at 11:00;  Stop 5/14/20 at 11:59;  Status DC


Potassium Chloride 90 meq/ Magnesium Sulfate 20 meq/ Multivitamins 10 

ml/Chromium/ Copper/Manganese/ Seleni/Zn 1 ml/ Insulin Human Regular 15 unit/ 

Total Parenteral Nutrition/Amino Acids/Dextrose/ Fat Emulsion Intravenous 1,920 

ml @  80 mls/hr TPN  CONT IV  Last administered on 5/14/20at 22:57;  Start 

5/14/20 at 22:00;  Stop 5/15/20 at 21:59;  Status DC


Potassium Chloride 90 meq/ Magnesium Sulfate 20 meq/ Multivitamins 10 

ml/Chromium/ Copper/Manganese/ Seleni/Zn 1 ml/ Insulin Human Regular 15 unit/ 

Total Parenteral Nutrition/Amino Acids/Dextrose/ Fat Emulsion Intravenous 1,920 

ml @  80 mls/hr TPN  CONT IV  Last administered on 5/15/20at 22:48;  Start 

5/15/20 at 22:00;  Stop 5/16/20 at 21:59;  Status DC


Potassium Chloride 90 meq/ Magnesium Sulfate 20 meq/ Multivitamins 10 

ml/Chromium/ Copper/Manganese/ Seleni/Zn 1 ml/ Insulin Human Regular 15 unit/ 

Total Parenteral Nutrition/Amino Acids/Dextrose/ Fat Emulsion Intravenous 1,890 

ml @  78.75 mls/ hr TPN  CONT IV  Last administered on 5/16/20at 22:15;  Start 

5/16/20 at 22:00;  Stop 5/17/20 at 21:59;  Status DC


Linezolid/Dextrose 300 ml @  300 mls/hr Q12HR IV  Last administered on 5/19/20at

21:08;  Start 5/17/20 at 09:00;  Stop 5/20/20 at 08:11;  Status DC


Daptomycin 450 mg/ Sodium Chloride 50 ml @  100 mls/hr Q24H IV  Last 

administered on 5/20/20at 09:25;  Start 5/17/20 at 09:00;  Stop 5/21/20 at 

08:30;  Status DC


Potassium Chloride 90 meq/ Magnesium Sulfate 20 meq/ Multivitamins 10 

ml/Chromium/ Copper/Manganese/ Seleni/Zn 1 ml/ Insulin Human Regular 15 unit/ 

Total Parenteral Nutrition/Amino Acids/Dextrose/ Fat Emulsion Intravenous 1,890 

ml @  78.75 mls/ hr TPN  CONT IV  Last administered on 5/17/20at 21:34;  Start 

5/17/20 at 22:00;  Stop 5/18/20 at 21:59;  Status DC


Lorazepam (Ativan Inj) 2 mg STK-MED ONCE .ROUTE ;  Start 5/17/20 at 14:58;  Stop

5/17/20 at 14:58;  Status DC


Metoprolol Tartrate (Lopressor Vial) 5 mg 1X  ONCE IVP  Last administered on 

5/17/20at 15:31;  Start 5/17/20 at 15:15;  Stop 5/17/20 at 15:16;  Status DC


Lorazepam (Ativan Inj) 2 mg 1X  ONCE IVP  Last administered on 5/17/20at 15:30; 

Start 5/17/20 at 15:15;  Stop 5/17/20 at 15:16;  Status DC


Enoxaparin Sodium (Lovenox 40mg Syringe) 40 mg Q24H SQ  Last administered on 

6/5/20at 17:44;  Start 5/17/20 at 17:00;  Stop 6/7/20 at 06:50;  Status DC


Lorazepam (Ativan Inj) 1 mg PRN Q4HRS  PRN IVP ANXIETY / AGITATION MILD-MOD Last

administered on 5/31/20at 15:55;  Start 5/17/20 at 19:15;  Stop 6/2/20 at 11:45;

 Status DC


Lorazepam (Ativan Inj) 2 mg PRN Q4HRS  PRN IVP ANXIETY / AGITATION SEVERE Last a

dministered on 6/1/20at 07:55;  Start 5/17/20 at 19:15;  Stop 6/2/20 at 11:45;  

Status DC


Fentanyl Citrate (Fentanyl 2ml Vial) 50 mcg PRN Q4HRS  PRN IVP SEVERE PAIN Last 

administered on 6/9/20at 04:40;  Start 5/18/20 at 13:15


Fentanyl Citrate (Fentanyl 2ml Vial) 25 mcg PRN Q4HRS  PRN IVP MODERATE PAIN 

Last administered on 6/10/20at 05:58;  Start 5/18/20 at 13:15


Potassium Chloride 90 meq/ Magnesium Sulfate 20 meq/ Multivitamins 10 

ml/Chromium/ Copper/Manganese/ Seleni/Zn 1 ml/ Insulin Human Regular 15 unit/ 

Total Parenteral Nutrition/Amino Acids/Dextrose/ Fat Emulsion Intravenous 1,890 

ml @  78.75 mls/ hr TPN  CONT IV  Last administered on 5/18/20at 22:18;  Start 

5/18/20 at 22:00;  Stop 5/19/20 at 21:59;  Status DC


Furosemide (Lasix) 40 mg 1X  ONCE IVP  Last administered on 5/18/20at 21:51;  

Start 5/18/20 at 21:45;  Stop 5/18/20 at 21:48;  Status DC


Albumin Human 100 ml @  100 mls/hr 1X PRN  PRN IV SEE COMMENTS;  Start 5/19/20 

at 01:30


Furosemide (Lasix) 40 mg [id number] IVP  Last administered on 6/3/20at 08:04;  Start 

5/19/20 at 14:00;  Stop 6/3/20 at 13:07;  Status DC


Potassium Chloride 90 meq/ Magnesium Sulfate 20 meq/ Multivitamins 10 

ml/Chromium/ Copper/Manganese/ Seleni/Zn 1 ml/ Insulin Human Regular 15 unit/ 

Total Parenteral Nutrition/Amino Acids/Dextrose/ Fat Emulsion Intravenous 1,800 

ml @  75 mls/hr TPN  CONT IV  Last administered on 5/19/20at 22:31;  Start 

5/19/20 at 22:00;  Stop 5/20/20 at 21:59;  Status DC


Potassium Chloride 90 meq/ Magnesium Sulfate 20 meq/ Multivitamins 10 

ml/Chromium/ Copper/Manganese/ Seleni/Zn 1 ml/ Insulin Human Regular 15 unit/ 

Total Parenteral Nutrition/Amino Acids/Dextrose/ Fat Emulsion Intravenous 1,800 

ml @  75 mls/hr TPN  CONT IV  Last administered on 5/20/20at 22:28;  Start 

5/20/20 at 22:00;  Stop 5/21/20 at 21:59;  Status DC


Potassium Chloride 110 meq/ Magnesium Sulfate 20 meq/ Multivitamins 10 

ml/Chromium/ Copper/Manganese/ Seleni/Zn 1 ml/ Insulin Human Regular 15 unit/ 

Total Parenteral Nutrition/Amino Acids/Dextrose/ Fat Emulsion Intravenous 1,800 

ml @  75 mls/hr TPN  CONT IV  Last administered on 5/21/20at 22:01;  Start 

5/21/20 at 22:00;  Stop 5/22/20 at 21:59;  Status DC


Saliva Substitute (Biotene Moisturizing Mouth) 2 spray PRN Q15MIN  PRN PO DRY 

MOUTH;  Start 5/21/20 at 11:00


Potassium Chloride 110 meq/ Magnesium Sulfate 20 meq/ Multivitamins 10 

ml/Chromium/ Copper/Manganese/ Seleni/Zn 1 ml/ Insulin Human Regular 15 unit/ 

Total Parenteral Nutrition/Amino Acids/Dextrose/ Fat Emulsion Intravenous 1,800 

ml @  75 mls/hr TPN  CONT IV  Last administered on 5/22/20at 22:21;  Start 

5/22/20 at 22:00;  Stop 5/23/20 at 21:59;  Status DC


Potassium Chloride 110 meq/ Magnesium Sulfate 20 meq/ Multivitamins 10 

ml/Chromium/ Copper/Manganese/ Seleni/Zn 1 ml/ Insulin Human Regular 15 unit/ 

Total Parenteral Nutrition/Amino Acids/Dextrose/ Fat Emulsion Intravenous 1,800 

ml @  75 mls/hr TPN  CONT IV  Last administered on 5/23/20at 22:04;  Start 

5/23/20 at 22:00;  Stop 5/24/20 at 21:59;  Status DC


Potassium Chloride 110 meq/ Magnesium Sulfate 20 meq/ Multivitamins 10 

ml/Chromium/ Copper/Manganese/ Seleni/Zn 1 ml/ Insulin Human Regular 15 unit/ 

Total Parenteral Nutrition/Amino Acids/Dextrose/ Fat Emulsion Intravenous 1,800 

ml @  75 mls/hr TPN  CONT IV  Last administered on 5/24/20at 22:48;  Start 

5/24/20 at 22:00;  Stop 5/25/20 at 21:59;  Status DC


Potassium Chloride 70 meq/ Magnesium Sulfate 20 meq/ Multivitamins 10 

ml/Chromium/ Copper/Manganese/ Seleni/Zn 1 ml/ Insulin Human Regular 15 unit/ 

Total Parenteral Nutrition/Amino Acids/Dextrose/ Fat Emulsion Intravenous 1,800 

ml @  75 mls/hr TPN  CONT IV  Last administered on 5/25/20at 21:39;  Start 

5/25/20 at 22:00;  Stop 5/26/20 at 21:59;  Status DC


Meropenem 500 mg/ Sodium Chloride 50 ml @  100 mls/hr Q6HRS IV  Last 

administered on 5/27/20at 06:02;  Start 5/25/20 at 18:00;  Stop 5/27/20 at 

09:59;  Status DC


Barium Sulfate (Varibar Thin Liquid Apple) 148 gm 1X  ONCE PO ;  Start 5/26/20 

at 11:45;  Stop 5/26/20 at 11:49;  Status DC


Potassium Chloride 70 meq/ Magnesium Sulfate 20 meq/ Multivitamins 10 

ml/Chromium/ Copper/Manganese/ Seleni/Zn 1 ml/ Insulin Human Regular 15 unit/ 

Total Parenteral Nutrition/Amino Acids/Dextrose/ Fat Emulsion Intravenous 1,800 

ml @  75 mls/hr TPN  CONT IV  Last administered on 5/26/20at 22:27;  Start 

5/26/20 at 22:00;  Stop 5/27/20 at 21:59;  Status DC


Piperacillin Sod/ Tazobactam Sod 3.375 gm/Sodium Chloride 50 ml @  100 mls/hr 

Q6HRS IV  Last administered on 6/4/20at 06:10;  Start 5/27/20 at 12:00;  Stop 

6/4/20 at 07:26;  Status DC


Potassium Chloride 70 meq/ Magnesium Sulfate 20 meq/ Multivitamins 10 

ml/Chromium/ Copper/Manganese/ Seleni/Zn 1 ml/ Insulin Human Regular 15 unit/ 

Total Parenteral Nutrition/Amino Acids/Dextrose/ Fat Emulsion Intravenous 1,800 

ml @  75 mls/hr TPN  CONT IV  Last administered on 5/27/20at 22:03;  Start 

5/27/20 at 22:00;  Stop 5/28/20 at 21:59;  Status DC


Potassium Chloride 70 meq/ Magnesium Sulfate 20 meq/ Multivitamins 10 

ml/Chromium/ Copper/Manganese/ Seleni/Zn 1 ml/ Insulin Human Regular 15 unit/ 

Total Parenteral Nutrition/Amino Acids/Dextrose/ Fat Emulsion Intravenous 1,800 

ml @  75 mls/hr TPN  CONT IV  Last administered on 5/28/20at 22:33;  Start 

5/28/20 at 22:00;  Stop 5/29/20 at 21:59;  Status DC


Potassium Chloride 70 meq/ Magnesium Sulfate 20 meq/ Multivitamins 10 

ml/Chromium/ Copper/Manganese/ Seleni/Zn 1 ml/ Insulin Human Regular 15 unit/ 

Total Parenteral Nutrition/Amino Acids/Dextrose/ Fat Emulsion Intravenous 1,800 

ml @  75 mls/hr TPN  CONT IV  Last administered on 5/29/20at 23:13;  Start 

5/29/20 at 22:00;  Stop 5/30/20 at 21:59;  Status DC


Potassium Chloride 80 meq/ Magnesium Sulfate 20 meq/ Multivitamins 10 

ml/Chromium/ Copper/Manganese/ Seleni/Zn 1 ml/ Insulin Human Regular 15 unit/ 

Total Parenteral Nutrition/Amino Acids/Dextrose/ Fat Emulsion Intravenous 1,800 

ml @  75 mls/hr TPN  CONT IV  Last administered on 5/30/20at 22:30;  Start 5/30 /20 at 22:00;  Stop 5/31/20 at 21:59;  Status DC


Potassium Chloride 80 meq/ Magnesium Sulfate 20 meq/ Multivitamins 10 ml/Chromi

um/ Copper/Manganese/ Seleni/Zn 1 ml/ Insulin Human Regular 15 unit/ Total 

Parenteral Nutrition/Amino Acids/Dextrose/ Fat Emulsion Intravenous 1,800 ml @  

75 mls/hr TPN  CONT IV  Last administered on 5/31/20at 21:54;  Start 5/31/20 at 

22:00;  Stop 6/1/20 at 21:59;  Status DC


Potassium Chloride/Water 100 ml @  100 mls/hr 1X  ONCE IV  Last administered on 

6/1/20at 10:15;  Start 6/1/20 at 10:00;  Stop 6/1/20 at 10:59;  Status DC


Potassium Chloride 90 meq/ Magnesium Sulfate 20 meq/ Multivitamins 10 

ml/Chromium/ Copper/Manganese/ Seleni/Zn 1 ml/ Insulin Human Regular 20 unit/ 

Total Parenteral Nutrition/Amino Acids/Dextrose/ Fat Emulsion Intravenous 1,800 

ml @  75 mls/hr TPN  CONT IV  Last administered on 6/1/20at 22:28;  Start 6/1/20

at 22:00;  Stop 6/2/20 at 21:59;  Status DC


Potassium Chloride 90 meq/ Magnesium Sulfate 20 meq/ Multivitamins 10 

ml/Chromium/ Copper/Manganese/ Seleni/Zn 1 ml/ Insulin Human Regular 20 unit/ 

Total Parenteral Nutrition/Amino Acids/Dextrose/ Fat Emulsion Intravenous 1,800 

ml @  75 mls/hr TPN  CONT IV  Last administered on 6/2/20at 22:08;  Start 6/2/20

at 22:00;  Stop 6/3/20 at 21:59;  Status DC


Lorazepam (Ativan Inj) 0.25 mg PRN Q4HRS  PRN IVP ANXIETY / AGITATION Last 

administered on 6/9/20at 03:31;  Start 6/3/20 at 07:30


Potassium Chloride 90 meq/ Magnesium Sulfate 20 meq/ Multivitamins 10 

ml/Chromium/ Copper/Manganese/ Seleni/Zn 1 ml/ Insulin Human Regular 20 unit/ 

Total Parenteral Nutrition/Amino Acids/Dextrose/ Fat Emulsion Intravenous 1,800 

ml @  75 mls/hr TPN  CONT IV  Last administered on 6/3/20at 23:13;  Start 6/3/20

at 22:00;  Stop 6/4/20 at 21:59;  Status DC


Furosemide (Lasix) 40 mg DAILY IVP  Last administered on 6/5/20at 11:14;  Start 

6/3/20 at 13:30;  Stop 6/7/20 at 09:12;  Status DC


Fluoxetine HCl (PROzac) 20 mg QHS PEG  Last administered on 6/9/20at 22:02;  

Start 6/4/20 at 21:00


Fentanyl (Duragesic 50mcg/ Hr Patch) 1 patch Q72H TD  Last administered on 

6/4/20at 21:22;  Start 6/4/20 at 21:00


Potassium Chloride 40 meq/ Potassium Acetate 60 meq/Magnesium Sulfate 10 meq/ 

Multivitamins 10 ml/Chromium/ Copper/Manganese/ Seleni/Zn 1 ml/ Insulin Human 

Regular 20 unit/ Total Parenteral Nutrition/Amino Acids/Dextrose/ Fat Emulsion 

Intravenous 1,800 ml @  75 mls/hr TPN  CONT IV  Last administered on 6/5/20at 

00:03;  Start 6/4/20 at 22:00;  Stop 6/5/20 at 21:59;  Status DC


Potassium Acetate 80 meq/Magnesium Sulfate 5 meq/ Multivitamins 10 ml/Chromium/ 

Copper/Manganese/ Seleni/Zn 1 ml/ Insulin Human Regular 20 unit/ Total 

Parenteral Nutrition/Amino Acids/Dextrose/ Fat Emulsion Intravenous 1,920 ml @  

80 mls/hr TPN  CONT IV  Last administered on 6/5/20at 21:59;  Start 6/5/20 at 

22:00;  Stop 6/6/20 at 21:59;  Status DC


Potassium Acetate 60 meq/Magnesium Sulfate 5 meq/ Multivitamins 10 ml/Chromium/ 

Copper/Manganese/ Seleni/Zn 1 ml/ Insulin Human Regular 30 unit/ Total 

Parenteral Nutrition/Amino Acids/Dextrose/ Fat Emulsion Intravenous 1,920 ml @  

80 mls/hr TPN  CONT IV  Last administered on 6/6/20at 21:54;  Start 6/6/20 at 

22:00;  Stop 6/7/20 at 21:59;  Status DC


Norepinephrine Bitartrate 8 mg/ Dextrose 258 ml @  13.332 mls/ hr CONT  PRN IV 

PER PROTOCOL Last administered on 6/7/20at 21:46;  Start 6/7/20 at 06:30


Albumin Human 500 ml @  125 mls/hr 1X  ONCE IV  Last administered on 6/7/20at 

08:10;  Start 6/7/20 at 08:15;  Stop 6/7/20 at 12:14;  Status DC


Potassium Acetate 40 meq/Magnesium Sulfate 5 meq/ Multivitamins 10 ml/Chromium/ 

Copper/Manganese/ Seleni/Zn 1 ml/ Insulin Human Regular 30 unit/ Total 

Parenteral Nutrition/Amino Acids/Dextrose/ Fat Emulsion Intravenous 1,920 ml @  

80 mls/hr TPN  CONT IV  Last administered on 6/7/20at 22:23;  Start 6/7/20 at 

22:00;  Stop 6/8/20 at 21:59;  Status DC


Meropenem 1 gm/ Sodium Chloride 100 ml @  200 mls/hr Q8HRS IV ;  Start 6/7/20 at

14:00;  Status Cancel


Meropenem 1 gm/ Sodium Chloride 100 ml @  200 mls/hr Q8HRS IV  Last administered

on 6/7/20at 11:04;  Start 6/7/20 at 10:00;  Stop 6/7/20 at 13:00;  Status DC


Meropenem 1 gm/ Sodium Chloride 100 ml @  200 mls/hr Q12HR IV  Last administered

on 6/9/20at 22:02;  Start 6/7/20 at 21:00


Sodium Chloride 1,000 ml @  1,000 mls/hr 1X  ONCE IV  Last administered on 

6/7/20at 11:06;  Start 6/7/20 at 10:45;  Stop 6/7/20 at 11:44;  Status DC


Micafungin Sodium 100 mg/Dextrose 100 ml @  100 mls/hr Q24H IV  Last 

administered on 6/9/20at 12:02;  Start 6/7/20 at 11:00


Daptomycin 410 mg/ Sodium Chloride 50 ml @  100 mls/hr Q24H IV  Last 

administered on 6/9/20at 13:33;  Start 6/7/20 at 14:00


Midazolam HCl (Versed) 2 mg STK-MED ONCE .ROUTE ;  Start 6/7/20 at 14:47;  Stop 

6/7/20 at 14:48;  Status DC


Fentanyl Citrate (Fentanyl 2ml Vial) 100 mcg STK-MED ONCE .ROUTE ;  Start 6/7/20

at 14:47;  Stop 6/7/20 at 14:48;  Status DC


Flumazenil (Romazicon) 0.5 mg STK-MED ONCE IV ;  Start 6/7/20 at 14:48;  Stop 

6/7/20 at 14:48;  Status DC


Naloxone HCl (Narcan) 0.4 mg STK-MED ONCE .ROUTE ;  Start 6/7/20 at 14:48;  Stop

6/7/20 at 14:48;  Status DC


Lidocaine HCl (Lidocaine 1% 20ml Vial) 20 ml STK-MED ONCE .ROUTE ;  Start 6/7/20

at 14:48;  Stop 6/7/20 at 14:48;  Status DC


Midazolam HCl (Versed) 2 mg 1X  ONCE IV  Last administered on 6/7/20at 15:28;  S

tart 6/7/20 at 15:00;  Stop 6/7/20 at 15:01;  Status DC


Fentanyl Citrate (Fentanyl 2ml Vial) 100 mcg 1X  ONCE IV  Last administered on 

6/7/20at 15:28;  Start 6/7/20 at 15:00;  Stop 6/7/20 at 15:01;  Status DC


Lidocaine HCl (Lidocaine 1% 20ml Vial) 20 ml 1X  ONCE INJ  Last administered on 

6/7/20at 15:30;  Start 6/7/20 at 15:00;  Stop 6/7/20 at 15:01;  Status DC


Sodium Chloride 1,000 ml @  100 mls/hr Q10H IV  Last administered on 6/10/20at 

05:11;  Start 6/7/20 at 20:00


Sodium Bicarbonate (Sodium Bicarb Adult 8.4% Syr) 50 meq 1X  ONCE IV  Last 

administered on 6/7/20at 21:47;  Start 6/7/20 at 22:00;  Stop 6/7/20 at 22:01;  

Status DC


Potassium Acetate 40 meq/Magnesium Sulfate 5 meq/ Multivitamins 10 ml/Chromium/ 

Copper/Manganese/ Seleni/Zn 1 ml/ Insulin Human Regular 30 unit/ Total 

Parenteral Nutrition/Amino Acids/Dextrose/ Fat Emulsion Intravenous 1,920 ml @  

80 mls/hr TPN  CONT IV  Last administered on 6/8/20at 22:28;  Start 6/8/20 at 

22:00;  Stop 6/9/20 at 21:59;  Status DC


Sodium Chloride 500 ml @  500 mls/hr 1X  ONCE IV  Last administered on 6/9/20at 

06:39;  Start 6/9/20 at 06:45;  Stop 6/9/20 at 07:44;  Status DC


Potassium Acetate 40 meq/Magnesium Sulfate 5 meq/ Multivitamins 10 ml/Chromium/ 

Copper/Manganese/ Seleni/Zn 1 ml/ Insulin Human Regular 30 unit/ Total 

Parenteral Nutrition/Amino Acids/Dextrose/ Fat Emulsion Intravenous 1,920 ml @  

80 mls/hr TPN  CONT IV  Last administered on 6/9/20at 22:03;  Start 6/9/20 at 

22:00;  Stop 6/10/20 at 21:59





Active Scripts


Active


Reported


Bisoprolol Fumarate 5 Mg Tablet 10 Mg PO DAILY


Vitals/I & O





Vital Sign - Last 24 Hours








 6/9/20 6/9/20 6/9/20 6/9/20





 07:57 08:00 08:31 09:00


 


Temp  99.5  





  99.5  


 


Pulse  144  137


 


Resp  49  49


 


B/P (MAP)  142/74 (96)  136/75 (95)


 


Pulse Ox  95 95 96


 


O2 Delivery Trach Collar Tracheal Collar Tracheal Collar Tracheal Collar


 


O2 Flow Rate 10.0 10.0 10.0 10.0


 


    





    





 6/9/20 6/9/20 6/9/20 6/9/20





 09:16 09:51 10:00 11:00


 


Pulse   137 128


 


Resp 56 50 44 36


 


B/P (MAP)   160/81 (107) 132/73 (92)


 


Pulse Ox 96 96 98 100


 


O2 Delivery Tracheal Collar Tracheal Collar Tracheal Collar Tracheal Collar


 


O2 Flow Rate 10.0 10.0 10.0 10.0





 6/9/20 6/9/20 6/9/20 6/9/20





 12:00 12:00 13:00 13:14


 


Temp 99.0   





 99.0   


 


Pulse 124  128 


 


Resp 34  36 32


 


B/P (MAP) 138/78 (98)  134/68 (90) 


 


Pulse Ox 100  95 100


 


O2 Delivery Tracheal Collar Trach Collar Tracheal Collar Tracheal Collar


 


O2 Flow Rate 10.0 10.0 10.0 


 


    





    





 6/9/20 6/9/20 6/9/20 6/9/20





 13:47 14:00 15:00 16:00


 


Pulse  145 142 


 


Resp  60 60 


 


B/P (MAP)  151/68 (95) 186/85 (118) 


 


Pulse Ox 100 91 93 


 


O2 Delivery Tracheal Collar Tracheal Collar Tracheal Collar Trach Collar


 


O2 Flow Rate 10.0 10.0 10.0 10.0





 6/9/20 6/9/20 6/9/20 6/9/20





 16:00 17:00 17:51 18:00


 


Temp 99.2   





 99.2   


 


Pulse 136 136  133


 


Resp 45 46  42


 


B/P (MAP) 143/79 (100) 165/87 (113)  147/77 (100)


 


Pulse Ox 98 97 93 91


 


O2 Delivery Tracheal Collar Tracheal Collar Nasal Cannula Nasal Cannula


 


O2 Flow Rate 10.0 10.0 4.0 4.0


 


    





    





 6/9/20 6/9/20 6/9/20 6/9/20





 18:21 19:00 20:00 20:00


 


Temp   97.7 





   97.7 


 


Pulse  133 131 


 


Resp 48 47 42 


 


B/P (MAP)  157/74 (101) 153/78 (103) 


 


Pulse Ox 91 98 98 


 


O2 Delivery Tracheal Collar Tracheal Collar Tracheal Collar Trach Collar


 


O2 Flow Rate 10.0 10.0 10.0 10.0


 


    





    





 6/9/20 6/9/20 6/9/20 6/10/20





 21:00 22:00 23:00 00:00


 


Pulse 128 128 130 


 


Resp 37 38 36 


 


B/P (MAP) 152/74 (100) 160/80 (106) 162/76 (104) 


 


Pulse Ox 100 100 100 


 


O2 Delivery Tracheal Collar Tracheal Collar Tracheal Collar Trach Collar


 


O2 Flow Rate 10.0 10.0 10.0 10.0





 6/10/20 6/10/20 6/10/20 6/10/20





 00:00 01:00 02:00 03:00


 


Temp 98.8   





 98.8   


 


Pulse 132 135 134 130


 


Resp 39 47 43 44


 


B/P (MAP) 165/85 (111) 170/86 (114) 169/79 (109) 164/75 (104)


 


Pulse Ox 98 97 96 100


 


O2 Delivery Tracheal Collar Tracheal Collar Tracheal Collar Tracheal Collar


 


O2 Flow Rate 10.0 10.0 10.0 10.0


 


    





    





 6/10/20 6/10/20 6/10/20 6/10/20





 04:00 04:00 05:00 06:00


 


Temp  97.8  





  97.8  


 


Pulse  128 128 128


 


Resp  33 43 34


 


B/P (MAP)  138/68 (91) 172/79 (110) 147/64 (91)


 


Pulse Ox  99 97 100


 


O2 Delivery Trach Collar Tracheal Collar Tracheal Collar Tracheal Collar


 


O2 Flow Rate 10.0 10.0 10.0 10.0


 


    





    





 6/10/20   





 07:00   


 


Pulse 126   


 


Resp 28   


 


B/P (MAP) 169/78 (108)   


 


Pulse Ox 100   


 


O2 Delivery Tracheal Collar   


 


O2 Flow Rate 10.0   














Intake and Output   


 


 6/9/20 6/9/20 6/10/20





 15:00 23:00 07:00


 


Intake Total  2396 ml 2100 ml


 


Output Total 1205 ml 1060 ml 965 ml


 


Balance -1205 ml 1336 ml 1135 ml











Hemodynamically unstable?:  No


Is patient in severe pain?:  No


Is NPO status required?:  No











GAETANO GONCALVES MD        Quintin 10, 2020 07:23

## 2020-06-10 NOTE — PDOC
Infectious Disease Note


Subjective:


Subjective


Patient  off pressors


still weak,anxious


has some abdo pain


tachycardic, on trach shield


blood stained drainage from drains


no fevers last 24 hrs





Vital Signs:


Vital Signs





Vital Signs








  Date Time  Temp Pulse Resp B/P (MAP) Pulse Ox O2 Delivery O2 Flow Rate FiO2


 


6/10/20 07:00  126 28 169/78 (108) 100 Tracheal Collar 10.0 


 


6/10/20 04:00 97.8       





 97.8       











Physical Exam:


PHYSICAL EXAM


GENERAL: Alert, awake, still lethargic


HEENT: NGT in place. Oral mucosa dry


NECK:  Tracheostomy 


LUNGS: Diminished aeration bases, no accessory muscle use 


HEART:  S1, S2, tachy, regular 


ABDOMEN: Mild distention, bowel sounds present, soft, grimaces to palpation 


Right lateral side drainage bag, 3 drains with bloodstained drainage


: [place] ( 6/ 7)


EXTREMITIES: Trace edema .no  cyanosis 


SKIN: no signs of gen rash 


CNS: Lethargic opens eyes, nods yes or no, still very weak


LUE-PICC (5/29) without signs of complications





Medications:


Inpatient Meds:





Current Medications








 Medications


  (Trade)  Dose


 Ordered  Sig/Yee  Start Time


 Stop Time Status Last Admin


Dose Admin


 


 Acetaminophen


  (Tylenol Supp)  650 mg  PRN Q6HRS  PRN  3/24/20 10:30


    6/7/20 14:41


650 MG


 


 Acetaminophen


  (Tylenol)  650 mg  PRN Q6HRS  PRN  3/21/20 03:36


 5/13/20 10:25 DC 4/16/20 19:56


650 MG


 


 Albumin Human  500 ml @ 


 125 mls/hr  1X  ONCE  6/7/20 08:15


 6/7/20 12:14 DC 6/7/20 08:10


125 MLS/HR


 


 Albuterol Sulfate


  (Ventolin Neb


 Soln)  2.5 mg  1X  ONCE  3/17/20 22:30


 3/17/20 22:31 DC 3/18/20 00:56


2.5 MG


 


 Alteplase,


 Recombinant


  (Cathflo For


 Central Catheter


 Clearance)  1 mg  1X  ONCE  4/24/20 10:45


 4/24/20 10:46 DC 4/24/20 11:44


1 MG


 


 Amino Acids/


 Glycerin/


 Electrolytes  1,000 ml @ 


 75 mls/hr  [id number]  4/20/20 21:15


   UNV  





 


 Artificial Tears


  (Artificial


 Tears)  1 drop  PRN Q15MIN  PRN  4/29/20 05:30


    5/29/20 10:08


1 DROP


 


 Atenolol


  (Tenormin)  100 mg  DAILY  3/17/20 09:00


 3/16/20 20:08 DC  





 


 Atropine Sulfate


  (ATROPINE 0.5mg


 SYRINGE)  0.5 mg  PRN Q5MIN  PRN  4/2/20 08:15


     





 


 Barium Sulfate


  (Varibar Thin


 Liquid Apple)  148 gm  1X  ONCE  5/26/20 11:45


 5/26/20 11:49 DC  





 


 Benzocaine


  (Hurricaine One)  1 spray  1X  ONCE  3/20/20 14:30


 3/20/20 14:31 DC 3/20/20 16:38


1 SPRAY


 


 Bisacodyl


  (Dulcolax Supp)  10 mg  STK-MED ONCE  4/27/20 10:59


 4/27/20 10:59 DC  





 


 Bumetanide


  (Bumex)  2 mg  DAILY  5/8/20 10:00


 5/18/20 17:15 DC 5/18/20 08:07


2 MG


 


 Bupivacaine HCl/


 Epinephrine Bitart


  (Sensorcain-Epi


 0.5%-1:119805 Mpf)  30 ml  STK-MED ONCE  4/27/20 10:58


 4/27/20 10:58 DC 4/27/20 12:01


7 ML


 


 Calcium Carbonate/


 Glycine


  (Tums)  500 mg  PRN [id number]  PRN  3/18/20 17:45


 5/13/20 10:25 DC  





 


 Calcium Chloride


 1000 mg/Sodium


 Chloride  110 ml @ 


 220 mls/hr  1X  ONCE  3/17/20 22:30


 3/17/20 22:59 DC 3/17/20 22:11


220 MLS/HR


 


 Calcium Chloride


 3000 mg/Sodium


 Chloride  1,030 ml @ 


 50 mls/hr  [id number]  3/19/20 08:00


 3/21/20 15:23 DC 3/21/20 02:17


50 MLS/HR


 


 Calcium Gluconate


  (Calcium


 Gluconate)  2,000 mg  1X  ONCE  3/19/20 02:15


 3/19/20 02:16 DC 3/19/20 02:19


2,000 MG


 


 Calcium Gluconate


 1000 mg/Sodium


 Chloride  110 ml @ 


 220 mls/hr  1X  ONCE  3/18/20 03:30


 3/18/20 03:59 DC 3/18/20 03:21


220 MLS/HR


 


 Calcium Gluconate


 2000 mg/Sodium


 Chloride  120 ml @ 


 220 mls/hr  1X  ONCE  3/18/20 07:30


 3/18/20 08:02 DC 3/18/20 09:05


220 MLS/HR


 


 Cefepime HCl


  (Maxipime)  2 gm  Q12HR  3/25/20 09:00


 4/8/20 09:58 DC 4/7/20 20:56


2 GM


 


 Cellulose


  (Surgicel


 Fibrillar 1x2)  1 each  STK-MED ONCE  4/6/20 11:00


 4/6/20 11:01 DC  





 


 Cellulose


  (Surgicel


 Hemostat 2x14)  1 each  STK-MED ONCE  4/27/20 10:58


 4/27/20 10:59 DC  





 


 Cellulose


  (Surgicel


 Hemostat 4x8)  1 each  STK-MED ONCE  4/27/20 10:58


 4/27/20 10:59 DC  





 


 Cyclobenzaprine


 HCl


  (Flexeril)  10 mg  PRN Q6HRS  PRN  4/30/20 10:45


     





 


 Daptomycin 410 mg/


 Sodium Chloride  50 ml @ 


 100 mls/hr  Q24H  6/7/20 14:00


    6/9/20 13:33


100 MLS/HR


 


 Daptomycin 430 mg/


 Sodium Chloride  50 ml @ 


 100 mls/hr  Q24H  4/25/20 13:00


 4/30/20 20:58 DC 4/30/20 13:00


100 MLS/HR


 


 Daptomycin 450 mg/


 Sodium Chloride  50 ml @ 


 100 mls/hr  Q24H  5/17/20 09:00


 5/21/20 08:30 DC 5/20/20 09:25


100 MLS/HR


 


 Daptomycin 485 mg/


 Sodium Chloride  50 ml @ 


 100 mls/hr  Q24H  5/4/20 11:00


 5/12/20 07:44 DC 5/11/20 13:10


100 MLS/HR


 


 Daptomycin 500 mg/


 Sodium Chloride  50 ml @ 


 100 mls/hr  Q48H  3/25/20 08:30


 4/10/20 10:07 DC 4/10/20 09:57


100 MLS/HR


 


 Dexamethasone


 Sodium Phosphate


  (Decadron)  4 mg  STK-MED ONCE  4/27/20 10:56


 4/27/20 10:57 DC  





 


 Dexmedetomidine


 HCl 400 mcg/


 Sodium Chloride  100 ml @ 0


 mls/hr  CONT  PRN  4/2/20 08:15


 5/30/20 18:31 DC 5/30/20 12:57


8 MLS/HR


 


 Dextrose


  (Dextrose


 50%-Water Syringe)  12.5 gm  PRN Q15MIN  PRN  3/16/20 09:30


     





 


 Digoxin


  (Lanoxin)  125 mcg  1X  ONCE  3/19/20 18:00


 3/19/20 18:01 DC 3/19/20 17:10


125 MCG


 


 Diphenhydramine


 HCl


  (Benadryl)  25 mg  1X PRN  PRN  4/24/20 15:45


 4/25/20 15:44 DC  





 


 Duloxetine HCl


  (Cymbalta)  30 mg  DAILY  5/10/20 14:00


 5/13/20 10:25 DC 5/11/20 09:48


30 MG


 


 Enoxaparin Sodium


  (Lovenox 100mg


 Syringe)  100 mg  Q12HR  4/21/20 21:00


   UNV  





 


 Enoxaparin Sodium


  (Lovenox 40mg


 Syringe)  40 mg  Q24H  5/17/20 17:00


 6/7/20 06:50 DC 6/5/20 17:44


40 MG


 


 Etomidate


  (Amidate)  8 mg  1X  ONCE  3/23/20 08:30


 3/23/20 08:31 DC 3/23/20 08:33


8 MG


 


 Fentanyl


  (Duragesic 50mcg/


 Hr Patch)  1 patch  Q72H  6/4/20 21:00


    6/4/20 21:22


1 PATCH


 


 Fentanyl Citrate


  (Fentanyl 2ml


 Vial)  100 mcg  1X  ONCE  6/7/20 15:00


 6/7/20 15:01 DC 6/7/20 15:28


50 MCG


 


 Fentanyl Citrate


  (Fentanyl 5ml


 Vial)  250 mcg  1X  ONCE  5/8/20 09:15


 5/8/20 09:16 DC 5/8/20 09:30


50 MCG


 


 Flumazenil


  (Romazicon)  0.5 mg  STK-MED ONCE  6/7/20 14:48


 6/7/20 14:48 DC  





 


 Fluoxetine HCl


  (PROzac)  20 mg  QHS  6/4/20 21:00


    6/9/20 22:02


20 MG


 


 Furosemide


  (Lasix)  40 mg  DAILY  6/3/20 13:30


 6/7/20 09:12 DC 6/5/20 11:14


40 MG


 


 Haloperidol


 Lactate


  (Haldol Inj)  3 mg  1X  ONCE  5/4/20 14:30


 5/4/20 14:31 DC 5/4/20 14:37


3 MG


 


 Heparin Sodium


  (Porcine)


  (Hep Lock Adult)  500 unit  STK-MED ONCE  4/7/20 09:29


 4/7/20 09:30 DC  





 


 Heparin Sodium


  (Porcine)


  (Heparin Sodium)  5,000 unit  Q12HR  4/27/20 21:00


 5/7/20 09:59 DC 5/6/20 20:57


5,000 UNIT


 


 Heparin Sodium


  (Porcine) 1000


 unit/Sodium


 Chloride  1,001 ml @ 


 1,001 mls/hr  1X  ONCE  4/27/20 12:00


 4/27/20 12:59 DC  





 


 Hydromorphone HCl


  (Dilaudid


 Standard PCA)  12 mg  STK-MED ONCE  5/1/20 15:50


 5/12/20 11:24 DC  





 


 Hydromorphone HCl


  (Dilaudid)  1 mg  PRN Q4HRS  PRN  5/4/20 19:00


 5/18/20 17:10 DC 5/18/20 06:25


1 MG


 


 Info


  (CONTRAST GIVEN


 -- Rx MONITORING)  1 each  PRN DAILY  PRN  3/30/20 11:45


 4/1/20 11:44 DC  





 


 Info


  (Icu Electrolyte


 Protocol)  1 ea  CONT PRN  PRN  3/29/20 13:15


     





 


 Info


  (PHARMACY


 MONITORING -- do


 not chart)  1 each  PRN DAILY  PRN  4/24/20 15:45


 5/26/20 14:14 DC  





 


 Info


  (Tpn Per


 Pharmacy)  1 each  PRN DAILY  PRN  3/18/20 12:30


   UNV  





 


 Insulin Human


 Lispro


  (HumaLOG)  0-9 UNITS  Q6HRS  3/16/20 09:30


    6/10/20 05:56


4 UNITS


 


 Insulin Human


 Regular


  (HumuLIN R VIAL)  5 unit  1X  ONCE  3/17/20 22:30


 3/17/20 22:31 DC 3/17/20 22:14


5 UNIT


 


 Iohexol


  (Omnipaque 240


 Mg/ml)  30 ml  1X  ONCE  3/30/20 11:30


 3/30/20 11:33 DC 3/30/20 11:30


30 ML


 


 Iohexol


  (Omnipaque 300


 Mg/ml)  50 ml  STK-MED ONCE  4/27/20 10:58


 4/27/20 10:59 DC  





 


 Iohexol


  (Omnipaque 350


 Mg/ml)  90 ml  1X  ONCE  3/16/20 03:30


 3/16/20 03:31 DC 3/16/20 03:25


90 ML


 


 Ketorolac


 Tromethamine


  (Toradol 30mg


 Vial)  30 mg  1X  ONCE  3/16/20 03:00


 3/16/20 03:01 DC 3/16/20 02:54


30 MG


 


 Lidocaine HCl


  (Buffered


 Lidocaine 1%)  6 ml  1X  ONCE  5/12/20 14:15


 5/12/20 14:16 DC 5/12/20 14:15


3 ML


 


 Lidocaine HCl


  (Glydo


  (Lidocaine) Jelly)  1 thomas  1X  ONCE  3/20/20 14:30


 3/20/20 14:31 DC 3/20/20 16:38


1 THOMAS


 


 Lidocaine HCl


  (Lidocaine 1%


 20ml Vial)  20 ml  1X  ONCE  6/7/20 15:00


 6/7/20 15:01 DC 6/7/20 15:30


20 ML


 


 Lidocaine HCl


  (Xylocaine-Mpf


 1% 2ml Vial)  2 ml  PRN 1X  PRN  4/27/20 07:00


 4/28/20 06:59 DC  





 


 Linezolid/Dextrose  300 ml @ 


 300 mls/hr  Q12HR  5/17/20 09:00


 5/20/20 08:11 DC 5/19/20 21:08


300 MLS/HR


 


 Lorazepam


  (Ativan Inj)  0.25 mg  PRN Q4HRS  PRN  6/3/20 07:30


    6/9/20 03:31


0.25 MG


 


 Magnesium Sulfate  50 ml @ 25


 mls/hr  1X  ONCE  5/10/20 09:00


 5/10/20 10:59 DC 5/10/20 10:44


25 MLS/HR


 


 Meropenem 1 gm/


 Sodium Chloride  100 ml @ 


 200 mls/hr  Q12HR  6/7/20 21:00


    6/9/20 22:02


200 MLS/HR


 


 Meropenem 500 mg/


 Sodium Chloride  50 ml @ 


 100 mls/hr  Q6HRS  5/25/20 18:00


 5/27/20 09:59 DC 5/27/20 06:02


100 MLS/HR


 


 Metoclopramide HCl


  (Reglan Vial)  10 mg  PRN Q3HRS  PRN  5/9/20 16:45


    5/14/20 04:25


10 MG


 


 Metoprolol


 Tartrate


  (Lopressor Vial)  5 mg  1X  ONCE  5/17/20 15:15


 5/17/20 15:16 DC 5/17/20 15:31


5 MG


 


 Metronidazole  100 ml @ 


 100 mls/hr  Q8HRS  4/14/20 10:00


 4/21/20 08:10 DC 4/21/20 06:04


100 MLS/HR


 


 Micafungin Sodium


 100 mg/Dextrose  100 ml @ 


 100 mls/hr  Q24H  6/7/20 11:00


    6/9/20 12:02


100 MLS/HR


 


 Midazolam HCl


  (Versed)  2 mg  1X  ONCE  6/7/20 15:00


 6/7/20 15:01 DC 6/7/20 15:28


1 MG


 


 Midazolam HCl 100


 mg/Sodium Chloride  100 ml @ 7


 mls/hr  CONT  PRN  3/28/20 16:00


 6/3/20 14:38 DC 4/8/20 15:35


7 MLS/HR


 


 Midazolam HCl 50


 mg/Sodium Chloride  50 ml @ 0


 mls/hr  CONT  PRN  3/23/20 08:15


 3/28/20 15:59 DC 3/26/20 22:39


7 MLS/HR


 


 Morphine Sulfate


  (Morphine


 Sulfate)  2 mg  PRN Q2HR  PRN  3/16/20 05:00


 3/17/20 14:15 DC 3/17/20 12:26


2 MG


 


 Multi-Ingred


 Cream/Lotion/Oil/


 Oint


  (Artificial


 Tears Eye


 Ointment)  1 thomas  PRN Q1HR  PRN  3/25/20 17:30


 6/3/20 14:39 DC 4/13/20 08:19


1 THOMAS


 


 Naloxone HCl


  (Narcan)  0.4 mg  STK-MED ONCE  6/7/20 14:48


 6/7/20 14:48 DC  





 


 Norepinephrine


 Bitartrate 8 mg/


 Dextrose  258 ml @ 


 13.332 mls/


 hr  CONT  PRN  6/7/20 06:30


    6/7/20 21:46


13.332 MLS/HR


 


 Ondansetron HCl


  (Zofran)  4 mg  STK-MED ONCE  4/27/20 10:56


 4/27/20 10:57 DC  





 


 Pantoprazole


 Sodium


  (PROTONIX VIAL


 for IV PUSH)  40 mg  DAILYAC  3/16/20 11:30


    6/9/20 09:16


40 MG


 


 Phenylephrine HCl


  (PHENYLEPHRINE


 in 0.9% NACL PF)  1 mg  STK-MED ONCE  4/27/20 12:34


 4/27/20 12:34 DC  





 


 Piperacillin Sod/


 Tazobactam Sod


 3.375 gm/Sodium


 Chloride  50 ml @ 


 100 mls/hr  Q6HRS  5/27/20 12:00


 6/4/20 07:26 DC 6/4/20 06:10


100 MLS/HR


 


 Piperacillin Sod/


 Tazobactam Sod


 4.5 gm/Sodium


 Chloride  100 ml @ 


 200 mls/hr  1X  ONCE  3/16/20 06:00


 3/16/20 06:29 DC 3/16/20 05:44


200 MLS/HR


 


 Potassium


 Chloride 110 meq/


 Magnesium Sulfate


 20 meq/


 Multivitamins 10


 ml/Chromium/


 Copper/Manganese/


 Seleni/Zn 1 ml/


 Insulin Human


 Regular 15 unit/


 Total Parenteral


 Nutrition/Amino


 Acids/Dextrose/


 Fat Emulsion


 Intravenous  1,800 ml @ 


 75 mls/hr  TPN  CONT  5/24/20 22:00


 5/25/20 21:59 DC 5/24/20 22:48


75 MLS/HR


 


 Potassium


 Chloride 15 meq/


 Bicarbonate


 Dialysis Soln w/


 out KCl  5,007.5 ml


  @ 1,000 mls/


 hr  Q5H1M  3/29/20 20:00


 4/2/20 13:08 DC 4/1/20 18:14


1,000 MLS/HR


 


 Potassium


 Chloride 20 meq/


 Bicarbonate


 Dialysis Soln w/


 out KCl  5,010 ml @ 


 1,000 mls/hr  Q5H1M  3/25/20 16:00


 3/29/20 19:59 DC 3/29/20 14:54


1,000 MLS/HR


 


 Potassium


 Chloride 40 meq/


 Potassium Acetate


 60 meq/Magnesium


 Sulfate 10 meq/


 Multivitamins 10


 ml/Chromium/


 Copper/Manganese/


 Seleni/Zn 1 ml/


 Insulin Human


 Regular 20 unit/


 Total Parenteral


 Nutrition/Amino


 Acids/Dextrose/


 Fat Emulsion


 Intravenous  1,800 ml @ 


 75 mls/hr  TPN  CONT  6/4/20 22:00


 6/5/20 21:59 DC 6/5/20 00:03


75 MLS/HR


 


 Potassium


 Chloride 70 meq/


 Magnesium Sulfate


 20 meq/


 Multivitamins 10


 ml/Chromium/


 Copper/Manganese/


 Seleni/Zn 1 ml/


 Insulin Human


 Regular 15 unit/


 Total Parenteral


 Nutrition/Amino


 Acids/Dextrose/


 Fat Emulsion


 Intravenous  1,800 ml @ 


 75 mls/hr  TPN  CONT  5/29/20 22:00


 5/30/20 21:59 DC 5/29/20 23:13


75 MLS/HR


 


 Potassium


 Chloride 75 meq/


 Magnesium Sulfate


 15 meq/


 Multivitamins 10


 ml/Chromium/


 Copper/Manganese/


 Seleni/Zn 0.5 ml/


 Insulin Human


 Regular 15 unit/


 Total Parenteral


 Nutrition/Amino


 Acids/Dextrose/


 Fat Emulsion


 Intravenous  1,920 ml @ 


 80 mls/hr  TPN  CONT  5/9/20 22:00


 5/10/20 21:59 DC 5/9/20 22:41


80 MLS/HR


 


 Potassium


 Chloride 75 meq/


 Magnesium Sulfate


 15 meq/Calcium


 Gluconate 8 meq/


 Multivitamins 10


 ml/Chromium/


 Copper/Manganese/


 Seleni/Zn 0.5 ml/


 Insulin Human


 Regular 15 unit/


 Total Parenteral


 Nutrition/Amino


 Acids/Dextrose/


 Fat Emulsion


 Intravenous  1,920 ml @ 


 80 mls/hr  TPN  CONT  5/7/20 22:00


 5/8/20 21:59 DC 5/7/20 22:28


80 MLS/HR


 


 Potassium


 Chloride 75 meq/


 Magnesium Sulfate


 15 meq/Calcium


 Gluconate 8 meq/


 Multivitamins 10


 ml/Chromium/


 Copper/Manganese/


 Seleni/Zn 0.5 ml/


 Insulin Human


 Regular 20 unit/


 Total Parenteral


 Nutrition/Amino


 Acids/Dextrose/


 Fat Emulsion


 Intravenous  1,920 ml @ 


 80 mls/hr  TPN  CONT  5/6/20 22:00


 5/7/20 21:59 DC 5/6/20 22:00


80 MLS/HR


 


 Potassium


 Chloride 75 meq/


 Magnesium Sulfate


 15 meq/Calcium


 Gluconate 8 meq/


 Multivitamins 10


 ml/Chromium/


 Copper/Manganese/


 Seleni/Zn 0.5 ml/


 Insulin Human


 Regular 25 unit/


 Total Parenteral


 Nutrition/Amino


 Acids/Dextrose/


 Fat Emulsion


 Intravenous  1,920 ml @ 


 80 mls/hr  TPN  CONT  5/4/20 22:00


 5/5/20 21:59 DC 5/4/20 23:08


80 MLS/HR


 


 Potassium


 Chloride 75 meq/


 Magnesium Sulfate


 20 meq/Calcium


 Gluconate 10 meq/


 Multivitamins 10


 ml/Chromium/


 Copper/Manganese/


 Seleni/Zn 0.5 ml/


 Insulin Human


 Regular 25 unit/


 Total Parenteral


 Nutrition/Amino


 Acids/Dextrose/


 Fat Emulsion


 Intravenous  1,920 ml @ 


 80 mls/hr  TPN  CONT  5/3/20 22:00


 5/4/20 21:59 DC 5/3/20 22:04


80 MLS/HR


 


 Potassium


 Chloride 75 meq/


 Magnesium Sulfate


 20 meq/Calcium


 Gluconate 10 meq/


 Multivitamins 10


 ml/Chromium/


 Copper/Manganese/


 Seleni/Zn 0.5 ml/


 Insulin Human


 Regular 30 unit/


 Total Parenteral


 Nutrition/Amino


 Acids/Dextrose/


 Fat Emulsion


 Intravenous  1,920 ml @ 


 80 mls/hr  TPN  CONT  5/2/20 22:00


 5/3/20 22:00 DC 5/2/20 21:51


80 MLS/HR


 


 Potassium


 Chloride 80 meq/


 Magnesium Sulfate


 20 meq/


 Multivitamins 10


 ml/Chromium/


 Copper/Manganese/


 Seleni/Zn 0.5 ml/


 Insulin Human


 Regular 15 unit/


 Total Parenteral


 Nutrition/Amino


 Acids/Dextrose/


 Fat Emulsion


 Intravenous  1,920 ml @ 


 80 mls/hr  TPN  CONT  5/12/20 22:00


 5/13/20 21:59 DC 5/12/20 21:40


80 MLS/HR


 


 Potassium


 Chloride 80 meq/


 Magnesium Sulfate


 20 meq/


 Multivitamins 10


 ml/Chromium/


 Copper/Manganese/


 Seleni/Zn 1 ml/


 Insulin Human


 Regular 15 unit/


 Total Parenteral


 Nutrition/Amino


 Acids/Dextrose/


 Fat Emulsion


 Intravenous  1,800 ml @ 


 75 mls/hr  TPN  CONT  5/31/20 22:00


 6/1/20 21:59 DC 5/31/20 21:54


75 MLS/HR


 


 Potassium


 Chloride 90 meq/


 Magnesium Sulfate


 20 meq/


 Multivitamins 10


 ml/Chromium/


 Copper/Manganese/


 Seleni/Zn 1 ml/


 Insulin Human


 Regular 15 unit/


 Total Parenteral


 Nutrition/Amino


 Acids/Dextrose/


 Fat Emulsion


 Intravenous  1,800 ml @ 


 75 mls/hr  TPN  CONT  5/20/20 22:00


 5/21/20 21:59 DC 5/20/20 22:28


75 MLS/HR


 


 Potassium


 Chloride 90 meq/


 Magnesium Sulfate


 20 meq/


 Multivitamins 10


 ml/Chromium/


 Copper/Manganese/


 Seleni/Zn 1 ml/


 Insulin Human


 Regular 20 unit/


 Total Parenteral


 Nutrition/Amino


 Acids/Dextrose/


 Fat Emulsion


 Intravenous  1,800 ml @ 


 75 mls/hr  TPN  CONT  6/3/20 22:00


 6/4/20 21:59 DC 6/3/20 23:13


75 MLS/HR


 


 Potassium


 Chloride/Water  100 ml @ 


 100 mls/hr  1X  ONCE  6/1/20 10:00


 6/1/20 10:59 DC 6/1/20 10:15


100 MLS/HR


 


 Potassium


 Phosphate 20 mmol/


 Sodium Chloride  106.6667


 ml @ 


 51.667 m...  1X  ONCE  3/25/20 13:00


 3/25/20 15:03 DC 3/25/20 12:51


51.667 MLS/HR


 


 Potassium Acetate


 30 meq/Magnesium


 Sulfate 20 meq/


 Calcium Gluconate


 10 meq/


 Multivitamins 10


 ml/Chromium/


 Copper/Manganese/


 Seleni/Zn 0.5 ml/


 Insulin Human


 Regular 30 unit/


 Potassium


 Chloride 30 meq/


 Total Parenteral


 Nutrition/Amino


 Acids/Dextrose/


 Fat Emulsion


 Intravenous  1,920 ml @ 


 80 mls/hr  TPN  CONT  5/1/20 22:00


 5/2/20 21:59 DC 5/1/20 22:34


80 MLS/HR


 


 Potassium Acetate


 40 meq/Magnesium


 Sulfate 5 meq/


 Multivitamins 10


 ml/Chromium/


 Copper/Manganese/


 Seleni/Zn 1 ml/


 Insulin Human


 Regular 30 unit/


 Total Parenteral


 Nutrition/Amino


 Acids/Dextrose/


 Fat Emulsion


 Intravenous  1,920 ml @ 


 80 mls/hr  TPN  CONT  6/9/20 22:00


 6/10/20 21:59  6/9/20 22:03


80 MLS/HR


 


 Potassium Acetate


 55 meq/Magnesium


 Sulfate 20 meq/


 Calcium Gluconate


 10 meq/


 Multivitamins 10


 ml/Chromium/


 Copper/Manganese/


 Seleni/Zn 0.5 ml/


 Insulin Human


 Regular 30 unit/


 Total Parenteral


 Nutrition/Amino


 Acids/Dextrose/


 Fat Emulsion


 Intravenous  1,920 ml @ 


 80 mls/hr  TPN  CONT  4/30/20 22:00


 5/1/20 21:59 DC 5/1/20 01:00


80 MLS/HR


 


 Potassium Acetate


 55 meq/Magnesium


 Sulfate 20 meq/


 Calcium Gluconate


 10 meq/


 Multivitamins 10


 ml/Chromium/


 Copper/Manganese/


 Seleni/Zn 0.5 ml/


 Insulin Human


 Regular 35 unit/


 Total Parenteral


 Nutrition/Amino


 Acids/Dextrose/


 Fat Emulsion


 Intravenous  1,920 ml @ 


 80 mls/hr  TPN  CONT  4/28/20 22:00


 4/29/20 21:59 DC 4/28/20 22:02


80 MLS/HR


 


 Potassium Acetate


 60 meq/Magnesium


 Sulfate 5 meq/


 Multivitamins 10


 ml/Chromium/


 Copper/Manganese/


 Seleni/Zn 1 ml/


 Insulin Human


 Regular 30 unit/


 Total Parenteral


 Nutrition/Amino


 Acids/Dextrose/


 Fat Emulsion


 Intravenous  1,920 ml @ 


 80 mls/hr  TPN  CONT  6/6/20 22:00


 6/7/20 21:59 DC 6/6/20 21:54


80 MLS/HR


 


 Potassium Acetate


 65 meq/Magnesium


 Sulfate 20 meq/


 Calcium Gluconate


 10 meq/


 Multivitamins 10


 ml/Chromium/


 Copper/Manganese/


 Seleni/Zn 0.5 ml/


 Insulin Human


 Regular 30 unit/


 Total Parenteral


 Nutrition/Amino


 Acids/Dextrose/


 Fat Emulsion


 Intravenous  1,920 ml @ 


 80 mls/hr  TPN  CONT  4/29/20 22:00


 4/30/20 21:59 DC 4/29/20 22:22


80 MLS/HR


 


 Potassium Acetate


 80 meq/Magnesium


 Sulfate 5 meq/


 Multivitamins 10


 ml/Chromium/


 Copper/Manganese/


 Seleni/Zn 1 ml/


 Insulin Human


 Regular 20 unit/


 Total Parenteral


 Nutrition/Amino


 Acids/Dextrose/


 Fat Emulsion


 Intravenous  1,920 ml @ 


 80 mls/hr  TPN  CONT  6/5/20 22:00


 6/6/20 21:59 DC 6/5/20 21:59


80 MLS/HR


 


 Prochlorperazine


 Edisylate


  (Compazine)  5 mg  PACU PRN  PRN  4/27/20 07:00


 4/28/20 06:59 DC  





 


 Propofol  20 ml @ As


 Directed  STK-MED ONCE  4/27/20 12:26


 4/27/20 12:27 DC  





 


 Ringer's Solution  1,000 ml @ 


 30 mls/hr  Q24H  4/27/20 07:00


 4/27/20 18:59 DC  





 


 Rocuronium Bromide


  (Zemuron)  50 mg  STK-MED ONCE  4/27/20 10:56


 4/27/20 10:57 DC  





 


 Saliva Substitute


  (Biotene


 Moisturizing


 Mouth)  2 spray  PRN Q15MIN  PRN  5/21/20 11:00


     





 


 Sevoflurane


  (Ultane)  60 ml  STK-MED ONCE  4/27/20 12:26


 4/27/20 12:27 DC  





 


 Sodium


 Bicarbonate 50


 meq/Sodium


 Chloride  1,050 ml @ 


 75 mls/hr  Q14H  3/18/20 07:30


 3/23/20 10:28 DC 3/22/20 21:10


75 MLS/HR


 


 Sodium Acetate 50


 meq/Potassium


 Acetate 55 meq/


 Magnesium Sulfate


 20 meq/Calcium


 Gluconate 10 meq/


 Multivitamins 10


 ml/Chromium/


 Copper/Manganese/


 Seleni/Zn 0.5 ml/


 Insulin Human


 Regular 35 unit/


 Total Parenteral


 Nutrition/Amino


 Acids/Dextrose/


 Fat Emulsion


 Intravenous  1,800 ml @ 


 75 mls/hr  TPN  CONT  4/25/20 22:00


 4/26/20 21:59 DC 4/25/20 22:03


75 MLS/HR


 


 Sodium Bicarbonate


  (Sodium Bicarb


 Adult 8.4% Syr)  50 meq  1X  ONCE  6/7/20 22:00


 6/7/20 22:01 DC 6/7/20 21:47


50 MEQ


 


 Sodium Chloride  500 ml @ 


 500 mls/hr  1X  ONCE  6/9/20 06:45


 6/9/20 07:44 DC 6/9/20 06:39


500 MLS/HR


 


 Sodium Chloride


  (Normal Saline


 Flush)  3 ml  QSHIFT  PRN  4/27/20 13:45


     





 


 Sodium Chloride


 90 meq/Calcium


 Gluconate 10 meq/


 Multivitamins 10


 ml/Chromium/


 Copper/Manganese/


 Seleni/Zn 0.5 ml/


 Total Parenteral


 Nutrition/Amino


 Acids/Dextrose/


 Fat Emulsion


 Intravenous  1,512 ml @ 


 63 mls/hr  TPN  CONT  3/18/20 22:00


 3/19/20 21:59 DC 3/18/20 22:06


63 MLS/HR


 


 Sodium Chloride


 90 meq/Calcium


 Gluconate 10 meq/


 Multivitamins 10


 ml/Chromium/


 Copper/Manganese/


 Seleni/Zn 1 ml/


 Total Parenteral


 Nutrition/Amino


 Acids/Dextrose/


 Fat Emulsion


 Intravenous  55.005 ml


  @ 2.292


 mls/hr  TPN  CONT  3/18/20 22:00


 3/18/20 12:33 DC  





 


 Sodium Chloride


 90 meq/Magnesium


 Sulfate 10 meq/


 Calcium Gluconate


 20 meq/


 Multivitamins 10


 ml/Chromium/


 Copper/Manganese/


 Seleni/Zn 0.5 ml/


 Total Parenteral


 Nutrition/Amino


 Acids/Dextrose/


 Fat Emulsion


 Intravenous  1,512 ml @ 


 63 mls/hr  TPN  CONT  3/19/20 22:00


 3/20/20 21:59 DC 3/19/20 22:25


63 MLS/HR


 


 Sodium Chloride


 90 meq/Magnesium


 Sulfate 12 meq/


 Calcium Gluconate


 15 meq/


 Multivitamins 10


 ml/Chromium/


 Copper/Manganese/


 Seleni/Zn 0.5 ml/


 Insulin Human


 Regular 25 unit/


 Total Parenteral


 Nutrition/Amino


 Acids/Dextrose/


 Fat Emulsion


 Intravenous  1,400 ml @ 


 58.333 mls/


 hr  TPN  CONT  4/8/20 22:00


 4/9/20 21:59 DC 4/8/20 21:41


58.333 MLS/HR


 


 Sodium Chloride


 90 meq/Potassium


 Chloride 15 meq/


 Magnesium Sulfate


 12 meq/Calcium


 Gluconate 15 meq/


 Multivitamins 10


 ml/Chromium/


 Copper/Manganese/


 Seleni/Zn 0.5 ml/


 Insulin Human


 Regular 25 unit/


 Total Parenteral


 Nutrition/Amino


 Acids/Dextrose/


 Fat Emulsion


 Intravenous  1,400 ml @ 


 58.333 mls/


 hr  TPN  CONT  4/7/20 22:00


 4/8/20 21:59 DC 4/7/20 22:13


58.333 MLS/HR


 


 Sodium Chloride


 90 meq/Potassium


 Chloride 15 meq/


 Potassium


 Phosphate 10 mmol/


 Magnesium Sulfate


 8 meq/Calcium


 Gluconate 15 meq/


 Multivitamins 10


 ml/Chromium/


 Copper/Manganese/


 Seleni/Zn 0.5 ml/


 Insulin Human


 Regular 25 unit/


 Total Parenteral


 Nutrition/Amino


 Acids/Dextrose/


 Fat Emulsion


 Intravenous  1,400 ml @ 


 58.333 mls/


 hr  TPN  CONT  4/5/20 22:00


 4/6/20 21:59 DC 4/5/20 21:20


58.333 MLS/HR


 


 Sodium Chloride


 90 meq/Potassium


 Chloride 15 meq/


 Potassium


 Phosphate 10 mmol/


 Magnesium Sulfate


 10 meq/Calcium


 Gluconate 20 meq/


 Multivitamins 10


 ml/Chromium/


 Copper/Manganese/


 Seleni/Zn 0.5 ml/


 Total Parenteral


 Nutrition/Amino


 Acids/Dextrose/


 Fat Emulsion


 Intravenous  1,400 ml @ 


 58.333 mls/


 hr  TPN  CONT  3/23/20 22:00


 3/24/20 21:59 DC 3/23/20 21:42


58.333 MLS/HR


 


 Sodium Chloride


 90 meq/Potassium


 Chloride 15 meq/


 Potassium


 Phosphate 10 mmol/


 Magnesium Sulfate


 12 meq/Calcium


 Gluconate 15 meq/


 Multivitamins 10


 ml/Chromium/


 Copper/Manganese/


 Seleni/Zn 0.5 ml/


 Insulin Human


 Regular 25 unit/


 Total Parenteral


 Nutrition/Amino


 Acids/Dextrose/


 Fat Emulsion


 Intravenous  1,400 ml @ 


 58.333 mls/


 hr  TPN  CONT  4/6/20 22:00


 4/7/20 21:59 DC 4/6/20 22:24


58.333 MLS/HR


 


 Sodium Chloride


 90 meq/Potassium


 Chloride 15 meq/


 Potassium


 Phosphate 15 mmol/


 Magnesium Sulfate


 10 meq/Calcium


 Gluconate 15 meq/


 Multivitamins 10


 ml/Chromium/


 Copper/Manganese/


 Seleni/Zn 0.5 ml/


 Total Parenteral


 Nutrition/Amino


 Acids/Dextrose/


 Fat Emulsion


 Intravenous  1,400 ml @ 


 58.333 mls/


 hr  TPN  CONT  3/24/20 22:00


 3/25/20 21:59 DC 3/24/20 22:17


58.333 MLS/HR


 


 Sodium Chloride


 90 meq/Potassium


 Chloride 15 meq/


 Potassium


 Phosphate 15 mmol/


 Magnesium Sulfate


 10 meq/Calcium


 Gluconate 20 meq/


 Multivitamins 10


 ml/Chromium/


 Copper/Manganese/


 Seleni/Zn 0.5 ml/


 Total Parenteral


 Nutrition/Amino


 Acids/Dextrose/


 Fat Emulsion


 Intravenous  1,200 ml @ 


 50 mls/hr  TPN  CONT  3/22/20 22:00


 3/22/20 14:17 DC  





 


 Sodium Chloride


 90 meq/Potassium


 Chloride 15 meq/


 Potassium


 Phosphate 18 mmol/


 Magnesium Sulfate


 8 meq/Calcium


 Gluconate 15 meq/


 Multivitamins 10


 ml/Chromium/


 Copper/Manganese/


 Seleni/Zn 0.5 ml/


 Insulin Human


 Regular 10 unit/


 Total Parenteral


 Nutrition/Amino


 Acids/Dextrose/


 Fat Emulsion


 Intravenous  1,400 ml @ 


 58.333 mls/


 hr  TPN  CONT  3/27/20 22:00


 3/28/20 21:59 DC 3/27/20 21:43


58.333 MLS/HR


 


 Sodium Chloride


 90 meq/Potassium


 Chloride 15 meq/


 Potassium


 Phosphate 18 mmol/


 Magnesium Sulfate


 8 meq/Calcium


 Gluconate 15 meq/


 Multivitamins 10


 ml/Chromium/


 Copper/Manganese/


 Seleni/Zn 0.5 ml/


 Insulin Human


 Regular 15 unit/


 Total Parenteral


 Nutrition/Amino


 Acids/Dextrose/


 Fat Emulsion


 Intravenous  1,400 ml @ 


 58.333 mls/


 hr  TPN  CONT  3/30/20 22:00


 3/31/20 21:59 DC 3/30/20 21:47


58.333 MLS/HR


 


 Sodium Chloride


 90 meq/Potassium


 Chloride 15 meq/


 Potassium


 Phosphate 18 mmol/


 Magnesium Sulfate


 8 meq/Calcium


 Gluconate 15 meq/


 Multivitamins 10


 ml/Chromium/


 Copper/Manganese/


 Seleni/Zn 0.5 ml/


 Insulin Human


 Regular 20 unit/


 Total Parenteral


 Nutrition/Amino


 Acids/Dextrose/


 Fat Emulsion


 Intravenous  1,400 ml @ 


 58.333 mls/


 hr  TPN  CONT  4/2/20 22:00


 4/3/20 21:59 DC 4/2/20 22:45


58.333 MLS/HR


 


 Sodium Chloride


 90 meq/Potassium


 Chloride 15 meq/


 Potassium


 Phosphate 18 mmol/


 Magnesium Sulfate


 8 meq/Calcium


 Gluconate 15 meq/


 Multivitamins 10


 ml/Chromium/


 Copper/Manganese/


 Seleni/Zn 0.5 ml/


 Total Parenteral


 Nutrition/Amino


 Acids/Dextrose/


 Fat Emulsion


 Intravenous  1,400 ml @ 


 58.333 mls/


 hr  TPN  CONT  3/26/20 22:00


 3/27/20 21:59 DC 3/26/20 22:00


58.333 MLS/HR


 


 Sodium Chloride


 90 meq/Potassium


 Phosphate 15 mmol/


 Magnesium Sulfate


 12 meq/Calcium


 Gluconate 15 meq/


 Multivitamins 10


 ml/Chromium/


 Copper/Manganese/


 Seleni/Zn 0.5 ml/


 Insulin Human


 Regular 30 unit/


 Total Parenteral


 Nutrition/Amino


 Acids/Dextrose/


 Fat Emulsion


 Intravenous  1,400 ml @ 


 58.333 mls/


 hr  TPN  CONT  4/10/20 22:00


 4/11/20 21:59 DC 4/10/20 21:49


58.333 MLS/HR


 


 Sodium Chloride


 90 meq/Potassium


 Phosphate 15 mmol/


 Magnesium Sulfate


 12 meq/Calcium


 Gluconate 15 meq/


 Multivitamins 10


 ml/Chromium/


 Copper/Manganese/


 Seleni/Zn 0.5 ml/


 Insulin Human


 Regular 40 unit/


 Total Parenteral


 Nutrition/Amino


 Acids/Dextrose/


 Fat Emulsion


 Intravenous  1,400 ml @ 


 58.333 mls/


 hr  TPN  CONT  4/11/20 22:00


 4/12/20 21:59 DC 4/11/20 21:21


58.333 MLS/HR


 


 Sodium Chloride


 90 meq/Potassium


 Phosphate 19 mmol/


 Magnesium Sulfate


 12 meq/Calcium


 Gluconate 15 meq/


 Multivitamins 10


 ml/Chromium/


 Copper/Manganese/


 Seleni/Zn 0.5 ml/


 Insulin Human


 Regular 40 unit/


 Total Parenteral


 Nutrition/Amino


 Acids/Dextrose/


 Fat Emulsion


 Intravenous  1,400 ml @ 


 58.333 mls/


 hr  TPN  CONT  4/12/20 22:00


 4/13/20 21:59 DC 4/12/20 21:54


58.333 MLS/HR


 


 Sodium Chloride


 90 meq/Potassium


 Phosphate 5 mmol/


 Magnesium Sulfate


 12 meq/Calcium


 Gluconate 15 meq/


 Multivitamins 10


 ml/Chromium/


 Copper/Manganese/


 Seleni/Zn 0.5 ml/


 Insulin Human


 Regular 30 unit/


 Total Parenteral


 Nutrition/Amino


 Acids/Dextrose/


 Fat Emulsion


 Intravenous  1,400 ml @ 


 58.333 mls/


 hr  TPN  CONT  4/9/20 22:00


 4/10/20 21:59 DC 4/9/20 22:08


58.333 MLS/HR


 


 Sodium Chloride


 100 meq/Potassium


 Chloride 40 meq/


 Magnesium Sulfate


 15 meq/Calcium


 Gluconate 15 meq/


 Multivitamins 10


 ml/Chromium/


 Copper/Manganese/


 Seleni/Zn 0.5 ml/


 Insulin Human


 Regular 35 unit/


 Total Parenteral


 Nutrition/Amino


 Acids/Dextrose/


 Fat Emulsion


 Intravenous  1,400 ml @ 


 58.333 mls/


 hr  TPN  CONT  4/19/20 22:00


 4/20/20 21:59 DC 4/19/20 22:46


58.333 MLS/HR


 


 Sodium Chloride


 100 meq/Potassium


 Chloride 40 meq/


 Magnesium Sulfate


 20 meq/Calcium


 Gluconate 10 meq/


 Multivitamins 10


 ml/Chromium/


 Copper/Manganese/


 Seleni/Zn 0.5 ml/


 Insulin Human


 Regular 35 unit/


 Total Parenteral


 Nutrition/Amino


 Acids/Dextrose/


 Fat Emulsion


 Intravenous  1,400 ml @ 


 58.333 mls/


 hr  TPN  CONT  4/23/20 22:00


 4/24/20 21:59 DC 4/24/20 00:06


58.333 MLS/HR


 


 Sodium Chloride


 100 meq/Potassium


 Chloride 40 meq/


 Magnesium Sulfate


 20 meq/Calcium


 Gluconate 15 meq/


 Multivitamins 10


 ml/Chromium/


 Copper/Manganese/


 Seleni/Zn 0.5 ml/


 Insulin Human


 Regular 35 unit/


 Total Parenteral


 Nutrition/Amino


 Acids/Dextrose/


 Fat Emulsion


 Intravenous  1,400 ml @ 


 58.333 mls/


 hr  TPN  CONT  4/22/20 22:00


 4/23/20 21:59 DC 4/22/20 22:27


58.333 MLS/HR


 


 Sodium Chloride


 100 meq/Potassium


 Phosphate 10 mmol/


 Magnesium Sulfate


 12 meq/Calcium


 Gluconate 15 meq/


 Multivitamins 10


 ml/Chromium/


 Copper/Manganese/


 Seleni/Zn 0.5 ml/


 Insulin Human


 Regular 35 unit/


 Potassium


 Chloride 20 meq/


 Total Parenteral


 Nutrition/Amino


 Acids/Dextrose/


 Fat Emulsion


 Intravenous  1,400 ml @ 


 58.333 mls/


 hr  TPN  CONT  4/16/20 22:00


 4/17/20 21:59 DC 4/16/20 22:10


58.333 MLS/HR


 


 Sodium Chloride


 100 meq/Potassium


 Phosphate 19 mmol/


 Magnesium Sulfate


 12 meq/Calcium


 Gluconate 15 meq/


 Multivitamins 10


 ml/Chromium/


 Copper/Manganese/


 Seleni/Zn 0.5 ml/


 Insulin Human


 Regular 40 unit/


 Potassium


 Chloride 20 meq/


 Total Parenteral


 Nutrition/Amino


 Acids/Dextrose/


 Fat Emulsion


 Intravenous  1,400 ml @ 


 58.333 mls/


 hr  TPN  CONT  4/15/20 22:00


 4/16/20 21:59 DC 4/15/20 21:20


58.333 MLS/HR


 


 Sodium Chloride


 100 meq/Potassium


 Phosphate 5 mmol/


 Magnesium Sulfate


 12 meq/Calcium


 Gluconate 15 meq/


 Multivitamins 10


 ml/Chromium/


 Copper/Manganese/


 Seleni/Zn 0.5 ml/


 Insulin Human


 Regular 35 unit/


 Potassium


 Chloride 20 meq/


 Total Parenteral


 Nutrition/Amino


 Acids/Dextrose/


 Fat Emulsion


 Intravenous  1,400 ml @ 


 58.333 mls/


 hr  TPN  CONT  4/17/20 22:00


 4/18/20 21:59 DC 4/17/20 22:59


58.333 MLS/HR


 


 Succinylcholine


 Chloride


  (Anectine)  120 mg  1X  ONCE  3/23/20 08:30


 3/23/20 08:31 DC 3/23/20 08:34


120 MG


 


 Vecuronium Bromide


  (Norcuron Bolus)  6 mg  PRN Q6HRS  PRN  5/7/20 19:15


 5/7/20 19:35 DC  














Labs:


Lab





Laboratory Tests








Test


 6/9/20


12:16 6/9/20


18:14 6/9/20


23:54 6/10/20


05:50


 


Glucose (Fingerstick)


 140 mg/dL


(70-99) 158 mg/dL


(70-99) 116 mg/dL


(70-99) 





 


White Blood Count


 


 


 


 6.8 x10^3/uL


(4.0-11.0)


 


Red Blood Count


 


 


 


 2.67 x10^6/uL


(3.50-5.40)


 


Hemoglobin


 


 


 


 7.8 g/dL


(12.0-15.5)


 


Hematocrit


 


 


 


 24.0 %


(36.0-47.0)


 


Mean Corpuscular Volume    90 fL () 


 


Mean Corpuscular Hemoglobin    29 pg (25-35) 


 


Mean Corpuscular Hemoglobin


Concent 


 


 


 33 g/dL


(31-37)


 


Red Cell Distribution Width


 


 


 


 18.0 %


(11.5-14.5)


 


Platelet Count


 


 


 


 279 x10^3/uL


(140-400)


 


Neutrophils (%) (Auto)    71 % (31-73) 


 


Lymphocytes (%) (Auto)    20 % (24-48) 


 


Monocytes (%) (Auto)    7 % (0-9) 


 


Eosinophils (%) (Auto)    2 % (0-3) 


 


Basophils (%) (Auto)    0 % (0-3) 


 


Neutrophils # (Auto)


 


 


 


 4.8 x10^3/uL


(1.8-7.7)


 


Lymphocytes # (Auto)


 


 


 


 1.3 x10^3/uL


(1.0-4.8)


 


Monocytes # (Auto)


 


 


 


 0.5 x10^3/uL


(0.0-1.1)


 


Eosinophils # (Auto)


 


 


 


 0.1 x10^3/uL


(0.0-0.7)


 


Basophils # (Auto)


 


 


 


 0.0 x10^3/uL


(0.0-0.2)


 


Sodium Level


 


 


 


 147 mmol/L


(136-145)


 


Potassium Level


 


 


 


 3.9 mmol/L


(3.5-5.1)


 


Chloride Level


 


 


 


 117 mmol/L


()


 


Carbon Dioxide Level


 


 


 


 23 mmol/L


(21-32)


 


Anion Gap    7 (6-14) 


 


Blood Urea Nitrogen


 


 


 


 38 mg/dL


(7-20)


 


Creatinine


 


 


 


 1.0 mg/dL


(0.6-1.0)


 


Estimated GFR


(Cockcroft-Gault) 


 


 


 58.9 





 


Glucose Level


 


 


 


 164 mg/dL


(70-99)


 


Calcium Level


 


 


 


 10.0 mg/dL


(8.5-10.1)


 


Test


 6/10/20


05:54 


 


 





 


Glucose (Fingerstick)


 152 mg/dL


(70-99) 


 


 














Objective:


Assessment:


Patient with prolonged hospitalization


Multiple medical problems


Possible surgical procedures








Fever 


Acute pancreatitis with persistent  necrosis


CT a/p 4/9


    Increased ascites. Persistent evidence of necrotizing pancreatitis with 

fluid and phlegmon


   at the pancreas


   4/27 status post ROBERT drain placement; yeast


   5/6 fluid  candida parapsilosis fluid amylase high


CT 6/7


IMPRESSION:


1.   Sequela of pancreatitis with extensive pseudocysts again 


demonstrated, the right-sided collections are slightly larger since the 


prior exam, the left-sided collections are stable. 


6/7 fluid cult PSAE,yeast





Cholelithiasis with thickening of the gallbladder wall.


Leucocytosis 


JED,Hyperkalemia, Metabolic acidosis off dialysis


Acute hypoxic resp failure ,bilateral pleural effusion and atelectasis


hypocalcemia 


Prediabetes


HTN


s/p trach





Plan:


Plan of Care











 Continue meropenem, dose adjusted for renal function , June 7


cont  micafungin June 7


DC Dapto


Follow-up cultures fluid Pseudomonas,,yeast


Monitor a.m. labs


General surgery following


Monitor drain output


Maintain aspiration precaution


Supportive care


Prognosis poor


Critically ill





D/w nursing











IVAN FRANZ MD           Quintin 10, 2020 08:13

## 2020-06-10 NOTE — PDOC
SURGICAL PROGRESS NOTE


Subjective


Pt awake on trach shield, appears anxious


Vital Signs





Vital Signs








  Date Time  Temp Pulse Resp B/P (MAP) Pulse Ox O2 Delivery O2 Flow Rate FiO2


 


6/10/20 08:57     99 Tracheal Collar 10.0 


 


6/10/20 08:00 98.0 130 28 168/85 (112)    





 98.0       








I&O











Intake and Output 


 


 6/10/20





 06:59


 


Intake Total 4496 ml


 


Output Total 3150 ml


 


Balance 1346 ml


 


 


 


IV Total 4496 ml


 


Output Urine Total 2335 ml


 


Drainage Total 815 ml








PATIENT HAS A VILLASENOR:  Yes


General:  Alert, Cooperative, mild distress


Abdomen:  Soft, Other (drains with brownish drainage, c/w pancreatic necrosis)


Labs





Laboratory Tests








Test


 6/8/20


11:00 6/8/20


11:55 6/8/20


18:25 6/9/20


00:16


 


Stool Occult Blood Negative (NEG)    


 


White Blood Count


 


 11.8 x10^3/uL


(4.0-11.0) 


 





 


Red Blood Count


 


 2.93 x10^6/uL


(3.50-5.40) 


 





 


Hemoglobin


 


 8.6 g/dL


(12.0-15.5) 


 





 


Hematocrit


 


 26.0 %


(36.0-47.0) 


 





 


Mean Corpuscular Volume  89 fL ()   


 


Mean Corpuscular Hemoglobin  29 pg (25-35)   


 


Mean Corpuscular Hemoglobin


Concent 


 33 g/dL


(31-37) 


 





 


Red Cell Distribution Width


 


 16.8 %


(11.5-14.5) 


 





 


Platelet Count


 


 292 x10^3/uL


(140-400) 


 





 


Glucose (Fingerstick)


 


 203 mg/dL


(70-99) 190 mg/dL


(70-99) 126 mg/dL


(70-99)


 


Test


 6/9/20


04:00 6/9/20


05:24 6/9/20


06:22 6/9/20


12:16


 


White Blood Count


 8.0 x10^3/uL


(4.0-11.0) 


 


 





 


Red Blood Count


 2.92 x10^6/uL


(3.50-5.40) 


 


 





 


Hemoglobin


 8.6 g/dL


(12.0-15.5) 


 


 





 


Hematocrit


 26.2 %


(36.0-47.0) 


 


 





 


Mean Corpuscular Volume 90 fL ()    


 


Mean Corpuscular Hemoglobin 30 pg (25-35)    


 


Mean Corpuscular Hemoglobin


Concent 33 g/dL


(31-37) 


 


 





 


Red Cell Distribution Width


 17.4 %


(11.5-14.5) 


 


 





 


Platelet Count


 285 x10^3/uL


(140-400) 


 


 





 


Neutrophils (%) (Auto) 77 % (31-73)    


 


Lymphocytes (%) (Auto) 16 % (24-48)    


 


Monocytes (%) (Auto) 5 % (0-9)    


 


Eosinophils (%) (Auto) 2 % (0-3)    


 


Basophils (%) (Auto) 0 % (0-3)    


 


Neutrophils # (Auto)


 6.1 x10^3/uL


(1.8-7.7) 


 


 





 


Lymphocytes # (Auto)


 1.3 x10^3/uL


(1.0-4.8) 


 


 





 


Monocytes # (Auto)


 0.4 x10^3/uL


(0.0-1.1) 


 


 





 


Eosinophils # (Auto)


 0.2 x10^3/uL


(0.0-0.7) 


 


 





 


Basophils # (Auto)


 0.0 x10^3/uL


(0.0-0.2) 


 


 





 


Sodium Level


 150 mmol/L


(136-145) 


 


 





 


Potassium Level


 4.2 mmol/L


(3.5-5.1) 


 


 





 


Chloride Level


 118 mmol/L


() 


 


 





 


Carbon Dioxide Level


 23 mmol/L


(21-32) 


 


 





 


Anion Gap 9 (6-14)    


 


Blood Urea Nitrogen


 48 mg/dL


(7-20) 


 


 





 


Creatinine


 1.1 mg/dL


(0.6-1.0) 


 


 





 


Estimated GFR


(Cockcroft-Gault) 52.8 


 


 


 





 


Glucose Level


 165 mg/dL


(70-99) 


 


 





 


Calcium Level


 10.3 mg/dL


(8.5-10.1) 


 


 





 


O2 Saturation  90 % (92-99)   


 


Arterial Blood pH


 


 7.36


(7.35-7.45) 


 





 


Arterial Blood pCO2 at


Patient Temp 


 36 mmHg


(35-46) 


 





 


Arterial Blood pO2 at Patient


Temp 


 63 mmHg


() 


 





 


Arterial Blood HCO3


 


 20 mmol/L


(21-28) 


 





 


Arterial Blood Base Excess


 


 -5 mmol/L


(-3-3) 


 





 


FiO2  33   


 


Glucose (Fingerstick)


 


 


 131 mg/dL


(70-99) 140 mg/dL


(70-99)


 


Test


 6/9/20


18:14 6/9/20


23:54 6/10/20


05:50 6/10/20


05:54


 


Glucose (Fingerstick)


 158 mg/dL


(70-99) 116 mg/dL


(70-99) 


 152 mg/dL


(70-99)


 


White Blood Count


 


 


 6.8 x10^3/uL


(4.0-11.0) 





 


Red Blood Count


 


 


 2.67 x10^6/uL


(3.50-5.40) 





 


Hemoglobin


 


 


 7.8 g/dL


(12.0-15.5) 





 


Hematocrit


 


 


 24.0 %


(36.0-47.0) 





 


Mean Corpuscular Volume   90 fL ()  


 


Mean Corpuscular Hemoglobin   29 pg (25-35)  


 


Mean Corpuscular Hemoglobin


Concent 


 


 33 g/dL


(31-37) 





 


Red Cell Distribution Width


 


 


 18.0 %


(11.5-14.5) 





 


Platelet Count


 


 


 279 x10^3/uL


(140-400) 





 


Neutrophils (%) (Auto)   71 % (31-73)  


 


Lymphocytes (%) (Auto)   20 % (24-48)  


 


Monocytes (%) (Auto)   7 % (0-9)  


 


Eosinophils (%) (Auto)   2 % (0-3)  


 


Basophils (%) (Auto)   0 % (0-3)  


 


Neutrophils # (Auto)


 


 


 4.8 x10^3/uL


(1.8-7.7) 





 


Lymphocytes # (Auto)


 


 


 1.3 x10^3/uL


(1.0-4.8) 





 


Monocytes # (Auto)


 


 


 0.5 x10^3/uL


(0.0-1.1) 





 


Eosinophils # (Auto)


 


 


 0.1 x10^3/uL


(0.0-0.7) 





 


Basophils # (Auto)


 


 


 0.0 x10^3/uL


(0.0-0.2) 





 


Sodium Level


 


 


 147 mmol/L


(136-145) 





 


Potassium Level


 


 


 3.9 mmol/L


(3.5-5.1) 





 


Chloride Level


 


 


 117 mmol/L


() 





 


Carbon Dioxide Level


 


 


 23 mmol/L


(21-32) 





 


Anion Gap   7 (6-14)  


 


Blood Urea Nitrogen


 


 


 38 mg/dL


(7-20) 





 


Creatinine


 


 


 1.0 mg/dL


(0.6-1.0) 





 


Estimated GFR


(Cockcroft-Gault) 


 


 58.9 


 





 


Glucose Level


 


 


 164 mg/dL


(70-99) 





 


Calcium Level


 


 


 10.0 mg/dL


(8.5-10.1) 











Laboratory Tests








Test


 6/9/20


12:16 6/9/20


18:14 6/9/20


23:54 6/10/20


05:50


 


Glucose (Fingerstick)


 140 mg/dL


(70-99) 158 mg/dL


(70-99) 116 mg/dL


(70-99) 





 


White Blood Count


 


 


 


 6.8 x10^3/uL


(4.0-11.0)


 


Red Blood Count


 


 


 


 2.67 x10^6/uL


(3.50-5.40)


 


Hemoglobin


 


 


 


 7.8 g/dL


(12.0-15.5)


 


Hematocrit


 


 


 


 24.0 %


(36.0-47.0)


 


Mean Corpuscular Volume    90 fL () 


 


Mean Corpuscular Hemoglobin    29 pg (25-35) 


 


Mean Corpuscular Hemoglobin


Concent 


 


 


 33 g/dL


(31-37)


 


Red Cell Distribution Width


 


 


 


 18.0 %


(11.5-14.5)


 


Platelet Count


 


 


 


 279 x10^3/uL


(140-400)


 


Neutrophils (%) (Auto)    71 % (31-73) 


 


Lymphocytes (%) (Auto)    20 % (24-48) 


 


Monocytes (%) (Auto)    7 % (0-9) 


 


Eosinophils (%) (Auto)    2 % (0-3) 


 


Basophils (%) (Auto)    0 % (0-3) 


 


Neutrophils # (Auto)


 


 


 


 4.8 x10^3/uL


(1.8-7.7)


 


Lymphocytes # (Auto)


 


 


 


 1.3 x10^3/uL


(1.0-4.8)


 


Monocytes # (Auto)


 


 


 


 0.5 x10^3/uL


(0.0-1.1)


 


Eosinophils # (Auto)


 


 


 


 0.1 x10^3/uL


(0.0-0.7)


 


Basophils # (Auto)


 


 


 


 0.0 x10^3/uL


(0.0-0.2)


 


Sodium Level


 


 


 


 147 mmol/L


(136-145)


 


Potassium Level


 


 


 


 3.9 mmol/L


(3.5-5.1)


 


Chloride Level


 


 


 


 117 mmol/L


()


 


Carbon Dioxide Level


 


 


 


 23 mmol/L


(21-32)


 


Anion Gap    7 (6-14) 


 


Blood Urea Nitrogen


 


 


 


 38 mg/dL


(7-20)


 


Creatinine


 


 


 


 1.0 mg/dL


(0.6-1.0)


 


Estimated GFR


(Cockcroft-Gault) 


 


 


 58.9 





 


Glucose Level


 


 


 


 164 mg/dL


(70-99)


 


Calcium Level


 


 


 


 10.0 mg/dL


(8.5-10.1)


 


Test


 6/10/20


05:54 


 


 





 


Glucose (Fingerstick)


 152 mg/dL


(70-99) 


 


 











Problem List


Problems


Medical Problems:


(1) Acute pancreatitis


Status: Acute  





(2) Cholelithiasis


Status: Acute  








Assessment/Plan


severe pancreatic necrosis


pt appears more stable at this time.


cont supportive care


given persistent issues, will need to consider operative intervention.  Ideally,

favor medical stabilization over the next week or so and then consider operative

intervention.  Pt remains critically ill, but operative intervention may be best

way forward.  Previous operative intervention abandoned, given gradual 

improvement at that time, but given persistence would need full operative 

intervention ("step up").  Would include cholecystectomy, pancreatic 

necrosectomy.  Unlikely to be able to simply do cystgastrostomy, given lack of 

maturity of pseudocyst.  D/w hospitalist.





Justicifation of Admission Dx:


Justifications for Admission:


Justification of Admission Dx:  Yes











GAMAL ZHOU MD             Quintin 10, 2020 10:11

## 2020-06-10 NOTE — PDOC
PULMONARY PROGRESS NOTES


Subjective


Patient intubated on 3/23 , s/p trach 4/6, 


transferred back to ICU 6/5 for syncope with collapse


developed anemia, blood drainage from RLQ abdomen drain site, and surrounding 

firmness  / developed septic shock 6/7 from abdomen source, required levo 6/7


s/p 3 new drains 6/7 with brown color drainage


was place on AVAPS 6/7 post 


On low dose levo for hypotension, now off. received aggressive IVF, improving 

renal function 


Dark blood from ROBERT drains


Vitals





Vital Signs








  Date Time  Temp Pulse Resp B/P (MAP) Pulse Ox O2 Delivery O2 Flow Rate FiO2


 


6/10/20 10:23   18  98 Tracheal Collar  


 


6/10/20 08:57       10.0 


 


6/10/20 08:00 98.0 130  168/85 (112)    





 98.0       








General:  Alert, No acute distress


Lungs:  Other (diminshed in bases, Rhonci in LLL)


Cardiovascular:  S1, S2


Abdomen:  Soft, Non-tender, Other (bleeding from Sx. site RLQ and firmness)


Extremities:  Other (+1 BLE edema)


Skin:  Warm, Dry


Labs





Laboratory Tests








Test


 6/8/20


11:00 6/8/20


11:55 6/8/20


18:25 6/9/20


00:16


 


Stool Occult Blood Negative (NEG)    


 


White Blood Count


 


 11.8 x10^3/uL


(4.0-11.0) 


 





 


Red Blood Count


 


 2.93 x10^6/uL


(3.50-5.40) 


 





 


Hemoglobin


 


 8.6 g/dL


(12.0-15.5) 


 





 


Hematocrit


 


 26.0 %


(36.0-47.0) 


 





 


Mean Corpuscular Volume  89 fL ()   


 


Mean Corpuscular Hemoglobin  29 pg (25-35)   


 


Mean Corpuscular Hemoglobin


Concent 


 33 g/dL


(31-37) 


 





 


Red Cell Distribution Width


 


 16.8 %


(11.5-14.5) 


 





 


Platelet Count


 


 292 x10^3/uL


(140-400) 


 





 


Glucose (Fingerstick)


 


 203 mg/dL


(70-99) 190 mg/dL


(70-99) 126 mg/dL


(70-99)


 


Test


 6/9/20


04:00 6/9/20


05:24 6/9/20


06:22 6/9/20


12:16


 


White Blood Count


 8.0 x10^3/uL


(4.0-11.0) 


 


 





 


Red Blood Count


 2.92 x10^6/uL


(3.50-5.40) 


 


 





 


Hemoglobin


 8.6 g/dL


(12.0-15.5) 


 


 





 


Hematocrit


 26.2 %


(36.0-47.0) 


 


 





 


Mean Corpuscular Volume 90 fL ()    


 


Mean Corpuscular Hemoglobin 30 pg (25-35)    


 


Mean Corpuscular Hemoglobin


Concent 33 g/dL


(31-37) 


 


 





 


Red Cell Distribution Width


 17.4 %


(11.5-14.5) 


 


 





 


Platelet Count


 285 x10^3/uL


(140-400) 


 


 





 


Neutrophils (%) (Auto) 77 % (31-73)    


 


Lymphocytes (%) (Auto) 16 % (24-48)    


 


Monocytes (%) (Auto) 5 % (0-9)    


 


Eosinophils (%) (Auto) 2 % (0-3)    


 


Basophils (%) (Auto) 0 % (0-3)    


 


Neutrophils # (Auto)


 6.1 x10^3/uL


(1.8-7.7) 


 


 





 


Lymphocytes # (Auto)


 1.3 x10^3/uL


(1.0-4.8) 


 


 





 


Monocytes # (Auto)


 0.4 x10^3/uL


(0.0-1.1) 


 


 





 


Eosinophils # (Auto)


 0.2 x10^3/uL


(0.0-0.7) 


 


 





 


Basophils # (Auto)


 0.0 x10^3/uL


(0.0-0.2) 


 


 





 


Sodium Level


 150 mmol/L


(136-145) 


 


 





 


Potassium Level


 4.2 mmol/L


(3.5-5.1) 


 


 





 


Chloride Level


 118 mmol/L


() 


 


 





 


Carbon Dioxide Level


 23 mmol/L


(21-32) 


 


 





 


Anion Gap 9 (6-14)    


 


Blood Urea Nitrogen


 48 mg/dL


(7-20) 


 


 





 


Creatinine


 1.1 mg/dL


(0.6-1.0) 


 


 





 


Estimated GFR


(Cockcroft-Gault) 52.8 


 


 


 





 


Glucose Level


 165 mg/dL


(70-99) 


 


 





 


Calcium Level


 10.3 mg/dL


(8.5-10.1) 


 


 





 


O2 Saturation  90 % (92-99)   


 


Arterial Blood pH


 


 7.36


(7.35-7.45) 


 





 


Arterial Blood pCO2 at


Patient Temp 


 36 mmHg


(35-46) 


 





 


Arterial Blood pO2 at Patient


Temp 


 63 mmHg


() 


 





 


Arterial Blood HCO3


 


 20 mmol/L


(21-28) 


 





 


Arterial Blood Base Excess


 


 -5 mmol/L


(-3-3) 


 





 


FiO2  33   


 


Glucose (Fingerstick)


 


 


 131 mg/dL


(70-99) 140 mg/dL


(70-99)


 


Test


 6/9/20


18:14 6/9/20


23:54 6/10/20


05:50 6/10/20


05:54


 


Glucose (Fingerstick)


 158 mg/dL


(70-99) 116 mg/dL


(70-99) 


 152 mg/dL


(70-99)


 


White Blood Count


 


 


 6.8 x10^3/uL


(4.0-11.0) 





 


Red Blood Count


 


 


 2.67 x10^6/uL


(3.50-5.40) 





 


Hemoglobin


 


 


 7.8 g/dL


(12.0-15.5) 





 


Hematocrit


 


 


 24.0 %


(36.0-47.0) 





 


Mean Corpuscular Volume   90 fL ()  


 


Mean Corpuscular Hemoglobin   29 pg (25-35)  


 


Mean Corpuscular Hemoglobin


Concent 


 


 33 g/dL


(31-37) 





 


Red Cell Distribution Width


 


 


 18.0 %


(11.5-14.5) 





 


Platelet Count


 


 


 279 x10^3/uL


(140-400) 





 


Neutrophils (%) (Auto)   71 % (31-73)  


 


Lymphocytes (%) (Auto)   20 % (24-48)  


 


Monocytes (%) (Auto)   7 % (0-9)  


 


Eosinophils (%) (Auto)   2 % (0-3)  


 


Basophils (%) (Auto)   0 % (0-3)  


 


Neutrophils # (Auto)


 


 


 4.8 x10^3/uL


(1.8-7.7) 





 


Lymphocytes # (Auto)


 


 


 1.3 x10^3/uL


(1.0-4.8) 





 


Monocytes # (Auto)


 


 


 0.5 x10^3/uL


(0.0-1.1) 





 


Eosinophils # (Auto)


 


 


 0.1 x10^3/uL


(0.0-0.7) 





 


Basophils # (Auto)


 


 


 0.0 x10^3/uL


(0.0-0.2) 





 


Sodium Level


 


 


 147 mmol/L


(136-145) 





 


Potassium Level


 


 


 3.9 mmol/L


(3.5-5.1) 





 


Chloride Level


 


 


 117 mmol/L


() 





 


Carbon Dioxide Level


 


 


 23 mmol/L


(21-32) 





 


Anion Gap   7 (6-14)  


 


Blood Urea Nitrogen


 


 


 38 mg/dL


(7-20) 





 


Creatinine


 


 


 1.0 mg/dL


(0.6-1.0) 





 


Estimated GFR


(Cockcroft-Gault) 


 


 58.9 


 





 


Glucose Level


 


 


 164 mg/dL


(70-99) 





 


Calcium Level


 


 


 10.0 mg/dL


(8.5-10.1) 











Laboratory Tests








Test


 6/9/20


12:16 6/9/20


18:14 6/9/20


23:54 6/10/20


05:50


 


Glucose (Fingerstick)


 140 mg/dL


(70-99) 158 mg/dL


(70-99) 116 mg/dL


(70-99) 





 


White Blood Count


 


 


 


 6.8 x10^3/uL


(4.0-11.0)


 


Red Blood Count


 


 


 


 2.67 x10^6/uL


(3.50-5.40)


 


Hemoglobin


 


 


 


 7.8 g/dL


(12.0-15.5)


 


Hematocrit


 


 


 


 24.0 %


(36.0-47.0)


 


Mean Corpuscular Volume    90 fL () 


 


Mean Corpuscular Hemoglobin    29 pg (25-35) 


 


Mean Corpuscular Hemoglobin


Concent 


 


 


 33 g/dL


(31-37)


 


Red Cell Distribution Width


 


 


 


 18.0 %


(11.5-14.5)


 


Platelet Count


 


 


 


 279 x10^3/uL


(140-400)


 


Neutrophils (%) (Auto)    71 % (31-73) 


 


Lymphocytes (%) (Auto)    20 % (24-48) 


 


Monocytes (%) (Auto)    7 % (0-9) 


 


Eosinophils (%) (Auto)    2 % (0-3) 


 


Basophils (%) (Auto)    0 % (0-3) 


 


Neutrophils # (Auto)


 


 


 


 4.8 x10^3/uL


(1.8-7.7)


 


Lymphocytes # (Auto)


 


 


 


 1.3 x10^3/uL


(1.0-4.8)


 


Monocytes # (Auto)


 


 


 


 0.5 x10^3/uL


(0.0-1.1)


 


Eosinophils # (Auto)


 


 


 


 0.1 x10^3/uL


(0.0-0.7)


 


Basophils # (Auto)


 


 


 


 0.0 x10^3/uL


(0.0-0.2)


 


Sodium Level


 


 


 


 147 mmol/L


(136-145)


 


Potassium Level


 


 


 


 3.9 mmol/L


(3.5-5.1)


 


Chloride Level


 


 


 


 117 mmol/L


()


 


Carbon Dioxide Level


 


 


 


 23 mmol/L


(21-32)


 


Anion Gap    7 (6-14) 


 


Blood Urea Nitrogen


 


 


 


 38 mg/dL


(7-20)


 


Creatinine


 


 


 


 1.0 mg/dL


(0.6-1.0)


 


Estimated GFR


(Cockcroft-Gault) 


 


 


 58.9 





 


Glucose Level


 


 


 


 164 mg/dL


(70-99)


 


Calcium Level


 


 


 


 10.0 mg/dL


(8.5-10.1)


 


Test


 6/10/20


05:54 


 


 





 


Glucose (Fingerstick)


 152 mg/dL


(70-99) 


 


 











Medications





Active Scripts








 Medications  Dose


 Route/Sig


 Max Daily Dose Days Date Category


 


 Bisoprolol


 Fumarate 5 Mg


 Tablet  10 Mg


 PO DAILY


   3/16/20 Reported








Comments


CXR 6/6/2020








IMPRESSION: Lines and tubes as described above. Left greater than right 


lower lobe opacities most likely pulmonary edema and left greater than 


right mild pleural effusions are stable. Superimposed left lower lobe 


pneumonia is not excluded.





ct abdomen /pelvis 6/6


1. Removal of the percutaneous pigtail drainage catheters since the prior 


exam. Sequela of pancreatitis with extensive pseudocysts again 


demonstrated, the right-sided collections are slightly larger since the 


prior exam, the left-sided collections are stable. See above.


2. Moderate to large left pleural effusion with atelectasis and collapse 


of most of the left lower lobe, stable. Small right pleural effusion is 


stable.


3. Gallstone.





Impression


.


IMPRESSION:


1.  Acute hypoxemic respiratory failure secondary to ARDS status post trach, 

developed anemia 6/7, blood drainage from RLQ abdomen drain site, and 

surrounding firmness  / developed septic shock 6/7 from abdomen source, required

levo 6/7


s/p 3 new drains 6/7 with brown color drainage,was placed on AVAPS 6/7 post 

procedure after receiving narcotics. back on TS


was On low dose levo for hypotension, now off. received aggressive IVF, impro

ving renal function 


Bloody drainage again from RBOERT drains turning dark brown. 





2.  Gallstone pancreatitis, now with ongoing bleeding from prior drain. Anemic. 

s/p Tx 4 units, .


3.  septic shock, recurrent 6/7, source abdomen. , off levo now, 


4.  Acute kidney injury-, Off HD--renal function decling. suspect JED on CKD due

to hypotension , improved now


5.  Acute gallstone pancreatitis.


6.  Hypoalbuminemia.


7.  Moderate persistent effusions, s/p left thora  5/12, reaccumulation of left 

effusion. O2 requirement not changed. 


8.  New Fever- ,hypotension. suspect recurrent sepsis/ likely pancreatic source.

 Per ID, per surgery--


9.  Chronic anemia-- ongoing / s/p PRBC


10. Covid 19 testing negative


11. Moderate to large ascites-S/P paracentisis


12.S/P paracentisis with 4 liters removed on 4/15/20


13. S/P IR drain placement on 5/8/2020, removal


14. Depression/Anxiety 


1





Plan


.


1. back on trach shield--  PMV/capping as tolerated/ prn suction.


2. Follow all cultures/ PSA from pelvic drains. Abx per ID


3. Follow surgery recs-- Acute pancreatitis with persistent necrosis ---- 4/27 

status post ROBERT drain placement + C paropsilosis; 5/6 + yeast & high amylase; s/p

additional drains on 5/8, removal of drains and now increase pseudocyst and 

increase fluid collection. likely infected fluid/ sepsis. Initiated BS 

abx.follow surgery rec, planning surgical intervention next week


4. Follow ID recs for ABX-- 


5. Follow nephrology recs --- 


6. Continue TPN 


7. monitor HGB,  4 units since 6/6


8. hold off on thoracentesis . effusion small to moderate , monitor for now


10. s/p  thoracentesis, 5/12, 3 litres removed


11. d/w IR in detail


     


DVT/GI PPX: / protonix--- holding lovenox 2/2 anemia


PT/OT


D/W SHARYN STEWART MD                 Quintin 10, 2020 10:35

## 2020-06-10 NOTE — NUR
SS following up with discharge planning. SS reviewed pt chart and discussed with pt RN. Pt 
currently remains on trach collar and TPN. Pt on IV Meropenem and Micafungin. Pt max assist 
with PT/OT. Pt has three drains. SS will continue to follow for discharge planning.

## 2020-06-10 NOTE — PDOC
Objective:


Objective:


No new GI concerns per nurse.


Reviewed surgery note - consideration for possible cholecystectomy, pancreatic 

necrosectomy.


Vital Signs:





                                   Vital Signs








  Date Time  Temp Pulse Resp B/P (MAP) Pulse Ox O2 Delivery O2 Flow Rate FiO2


 


6/10/20 10:23   18  98 Tracheal Collar  


 


6/10/20 08:57       10.0 


 


6/10/20 08:00 98.0 130  168/85 (112)    





 98.0       








Labs:





Laboratory Tests








Test


 6/9/20


12:16 6/9/20


18:14 6/9/20


23:54 6/10/20


05:50


 


Glucose (Fingerstick) 140 mg/dL  158 mg/dL  116 mg/dL  


 


White Blood Count    6.8 x10^3/uL 


 


Red Blood Count    2.67 x10^6/uL 


 


Hemoglobin    7.8 g/dL 


 


Hematocrit    24.0 % 


 


Mean Corpuscular Volume    90 fL 


 


Mean Corpuscular Hemoglobin    29 pg 


 


Mean Corpuscular Hemoglobin


Concent 


 


 


 33 g/dL 





 


Red Cell Distribution Width    18.0 % 


 


Platelet Count    279 x10^3/uL 


 


Neutrophils (%) (Auto)    71 % 


 


Lymphocytes (%) (Auto)    20 % 


 


Monocytes (%) (Auto)    7 % 


 


Eosinophils (%) (Auto)    2 % 


 


Basophils (%) (Auto)    0 % 


 


Neutrophils # (Auto)    4.8 x10^3/uL 


 


Lymphocytes # (Auto)    1.3 x10^3/uL 


 


Monocytes # (Auto)    0.5 x10^3/uL 


 


Eosinophils # (Auto)    0.1 x10^3/uL 


 


Basophils # (Auto)    0.0 x10^3/uL 


 


Sodium Level    147 mmol/L 


 


Potassium Level    3.9 mmol/L 


 


Chloride Level    117 mmol/L 


 


Carbon Dioxide Level    23 mmol/L 


 


Anion Gap    7 


 


Blood Urea Nitrogen    38 mg/dL 


 


Creatinine    1.0 mg/dL 


 


Estimated GFR


(Cockcroft-Gault) 


 


 


 58.9 





 


Glucose Level    164 mg/dL 


 


Calcium Level    10.0 mg/dL 


 


Test


 6/10/20


05:54 


 


 





 


Glucose (Fingerstick) 152 mg/dL    











PE:





GEN: resting


LUNGS: trach collar


HEART: tachycardic


ABD: soft, drains w/ dark blood - lesser amount


NEURO/PSYCH: briefly opens eyes





A/P:


Pancreatitis, MOSF





--


Continue support.





Justicifation of Admission Dx:


Justifications for Admission:


Justification of Admission Dx:  Yes











RAGHAVENDRA MURCIA         Quintin 10, 2020 10:38

## 2020-06-10 NOTE — NUR
Pharmacy TPN Dosing Note



S: JESENIA BEAN is a 49 year old F Currently receiving Central Continuous TPN started 
03/18/20



B:Pertinent PMH: Necrotizing pancreatitis

Height: 5 feet, 8 inches

Weight: 79.5 kg



Current diet: NPO 



LABS:

Sodium:    147 

Potassium: 3.9 

Chloride:  117 

Calcium:   10.0 

Corrected Calcium: 12.00 

Magnesium: 2.4 

CO2:       23 

SCr:       1 

Glucose:   116-164 

Albumin:   1.5 

AST:       15 

ALT:       12 



TPN FORMULA:

TPN TYPE:  Central Continuous

AMINO ACIDS:         75 gm

DEXTROSE:            250 gm

LIPIDS:              20 gm



POTASSIUM ACETATE:   40 mEq

MAGNESIUM:           5 mEq

INSULIN:             30 units

MULTIPLE VITAMIN:    10 ml

TRACE ELEMENTS:      1 ml(s)



TPN PLAN:  Cont same





R: Continue TPN 

Will monitor electrolytes, glucose, and tolerance to TPN.



 Maribell Vazquez RPH, 06/10/20 1041

## 2020-06-11 VITALS — SYSTOLIC BLOOD PRESSURE: 119 MMHG | DIASTOLIC BLOOD PRESSURE: 58 MMHG

## 2020-06-11 VITALS — SYSTOLIC BLOOD PRESSURE: 137 MMHG | DIASTOLIC BLOOD PRESSURE: 72 MMHG

## 2020-06-11 VITALS — DIASTOLIC BLOOD PRESSURE: 71 MMHG | SYSTOLIC BLOOD PRESSURE: 138 MMHG

## 2020-06-11 VITALS — SYSTOLIC BLOOD PRESSURE: 140 MMHG | DIASTOLIC BLOOD PRESSURE: 75 MMHG

## 2020-06-11 VITALS — DIASTOLIC BLOOD PRESSURE: 78 MMHG | SYSTOLIC BLOOD PRESSURE: 168 MMHG

## 2020-06-11 VITALS — DIASTOLIC BLOOD PRESSURE: 65 MMHG | SYSTOLIC BLOOD PRESSURE: 145 MMHG

## 2020-06-11 VITALS — SYSTOLIC BLOOD PRESSURE: 192 MMHG | DIASTOLIC BLOOD PRESSURE: 86 MMHG

## 2020-06-11 VITALS — SYSTOLIC BLOOD PRESSURE: 127 MMHG | DIASTOLIC BLOOD PRESSURE: 68 MMHG

## 2020-06-11 VITALS — SYSTOLIC BLOOD PRESSURE: 149 MMHG | DIASTOLIC BLOOD PRESSURE: 68 MMHG

## 2020-06-11 VITALS — SYSTOLIC BLOOD PRESSURE: 155 MMHG | DIASTOLIC BLOOD PRESSURE: 80 MMHG

## 2020-06-11 VITALS — DIASTOLIC BLOOD PRESSURE: 54 MMHG | SYSTOLIC BLOOD PRESSURE: 116 MMHG

## 2020-06-11 VITALS — SYSTOLIC BLOOD PRESSURE: 130 MMHG | DIASTOLIC BLOOD PRESSURE: 54 MMHG

## 2020-06-11 VITALS — SYSTOLIC BLOOD PRESSURE: 131 MMHG | DIASTOLIC BLOOD PRESSURE: 61 MMHG

## 2020-06-11 VITALS — SYSTOLIC BLOOD PRESSURE: 128 MMHG | DIASTOLIC BLOOD PRESSURE: 68 MMHG

## 2020-06-11 VITALS — SYSTOLIC BLOOD PRESSURE: 131 MMHG | DIASTOLIC BLOOD PRESSURE: 68 MMHG

## 2020-06-11 VITALS — DIASTOLIC BLOOD PRESSURE: 68 MMHG | SYSTOLIC BLOOD PRESSURE: 144 MMHG

## 2020-06-11 VITALS — SYSTOLIC BLOOD PRESSURE: 159 MMHG | DIASTOLIC BLOOD PRESSURE: 77 MMHG

## 2020-06-11 VITALS — DIASTOLIC BLOOD PRESSURE: 79 MMHG | SYSTOLIC BLOOD PRESSURE: 149 MMHG

## 2020-06-11 VITALS — SYSTOLIC BLOOD PRESSURE: 137 MMHG | DIASTOLIC BLOOD PRESSURE: 62 MMHG

## 2020-06-11 VITALS — SYSTOLIC BLOOD PRESSURE: 129 MMHG | DIASTOLIC BLOOD PRESSURE: 67 MMHG

## 2020-06-11 VITALS — SYSTOLIC BLOOD PRESSURE: 104 MMHG | DIASTOLIC BLOOD PRESSURE: 54 MMHG

## 2020-06-11 VITALS — DIASTOLIC BLOOD PRESSURE: 69 MMHG | SYSTOLIC BLOOD PRESSURE: 132 MMHG

## 2020-06-11 VITALS — DIASTOLIC BLOOD PRESSURE: 68 MMHG | SYSTOLIC BLOOD PRESSURE: 124 MMHG

## 2020-06-11 LAB
ALBUMIN SERPL-MCNC: 1.3 G/DL (ref 3.4–5)
ALBUMIN/GLOB SERPL: 0.3 {RATIO} (ref 1–1.7)
ALP SERPL-CCNC: 91 U/L (ref 46–116)
ALT SERPL-CCNC: 17 U/L (ref 14–59)
ANION GAP SERPL CALC-SCNC: 10 MMOL/L (ref 6–14)
AST SERPL-CCNC: 21 U/L (ref 15–37)
BASOPHILS # BLD AUTO: 0 X10^3/UL (ref 0–0.2)
BASOPHILS NFR BLD: 0 % (ref 0–3)
BILIRUB SERPL-MCNC: 0.4 MG/DL (ref 0.2–1)
BUN SERPL-MCNC: 32 MG/DL (ref 7–20)
BUN/CREAT SERPL: 36 (ref 6–20)
CALCIUM SERPL-MCNC: 9.9 MG/DL (ref 8.5–10.1)
CHLORIDE SERPL-SCNC: 115 MMOL/L (ref 98–107)
CO2 SERPL-SCNC: 22 MMOL/L (ref 21–32)
CREAT SERPL-MCNC: 0.9 MG/DL (ref 0.6–1)
EOSINOPHIL NFR BLD: 0.1 X10^3/UL (ref 0–0.7)
EOSINOPHIL NFR BLD: 1 % (ref 0–3)
ERYTHROCYTE [DISTWIDTH] IN BLOOD BY AUTOMATED COUNT: 17.4 % (ref 11.5–14.5)
GFR SERPLBLD BASED ON 1.73 SQ M-ARVRAT: 66.5 ML/MIN
GLOBULIN SER-MCNC: 3.9 G/DL (ref 2.2–3.8)
GLUCOSE SERPL-MCNC: 181 MG/DL (ref 70–99)
HCT VFR BLD CALC: 26.2 % (ref 36–47)
HGB BLD-MCNC: 8.6 G/DL (ref 12–15.5)
LYMPHOCYTES # BLD: 2.4 X10^3/UL (ref 1–4.8)
LYMPHOCYTES NFR BLD AUTO: 24 % (ref 24–48)
MAGNESIUM SERPL-MCNC: 1.8 MG/DL (ref 1.8–2.4)
MCH RBC QN AUTO: 30 PG (ref 25–35)
MCHC RBC AUTO-ENTMCNC: 33 G/DL (ref 31–37)
MCV RBC AUTO: 91 FL (ref 79–100)
MONO #: 0.5 X10^3/UL (ref 0–1.1)
MONOCYTES NFR BLD: 5 % (ref 0–9)
NEUT #: 6.9 X10^3/UL (ref 1.8–7.7)
NEUTROPHILS NFR BLD AUTO: 70 % (ref 31–73)
PHOSPHATE SERPL-MCNC: 4 MG/DL (ref 2.6–4.7)
PLATELET # BLD AUTO: 335 X10^3/UL (ref 140–400)
POTASSIUM SERPL-SCNC: 4.1 MMOL/L (ref 3.5–5.1)
PROT SERPL-MCNC: 5.2 G/DL (ref 6.4–8.2)
RBC # BLD AUTO: 2.9 X10^6/UL (ref 3.5–5.4)
SODIUM SERPL-SCNC: 147 MMOL/L (ref 136–145)
TRIGL SERPL-MCNC: 203 MG/DL (ref 0–150)
WBC # BLD AUTO: 9.8 X10^3/UL (ref 4–11)

## 2020-06-11 RX ADMIN — INSULIN LISPRO SCH UNITS: 100 INJECTION, SOLUTION INTRAVENOUS; SUBCUTANEOUS at 17:53

## 2020-06-11 RX ADMIN — FENTANYL CITRATE PRN MCG: 50 INJECTION INTRAMUSCULAR; INTRAVENOUS at 10:03

## 2020-06-11 RX ADMIN — BACITRACIN SCH MLS/HR: 5000 INJECTION, POWDER, FOR SOLUTION INTRAMUSCULAR at 02:13

## 2020-06-11 RX ADMIN — FENTANYL CITRATE PRN MCG: 50 INJECTION INTRAMUSCULAR; INTRAVENOUS at 14:33

## 2020-06-11 RX ADMIN — INSULIN LISPRO SCH UNITS: 100 INJECTION, SOLUTION INTRAVENOUS; SUBCUTANEOUS at 11:34

## 2020-06-11 RX ADMIN — Medication PRN EACH: at 12:57

## 2020-06-11 RX ADMIN — BACITRACIN SCH MLS/HR: 5000 INJECTION, POWDER, FOR SOLUTION INTRAMUSCULAR at 13:52

## 2020-06-11 RX ADMIN — METOPROLOL TARTRATE PRN MG: 5 INJECTION INTRAVENOUS at 14:33

## 2020-06-11 RX ADMIN — ONDANSETRON PRN MG: 2 INJECTION INTRAMUSCULAR; INTRAVENOUS at 15:22

## 2020-06-11 RX ADMIN — PANTOPRAZOLE SODIUM SCH MG: 40 INJECTION, POWDER, FOR SOLUTION INTRAVENOUS at 09:22

## 2020-06-11 RX ADMIN — BACITRACIN SCH MLS/HR: 5000 INJECTION, POWDER, FOR SOLUTION INTRAMUSCULAR at 12:27

## 2020-06-11 RX ADMIN — DILTIAZEM HYDROCHLORIDE SCH MLS/HR: 5 INJECTION INTRAVENOUS at 23:22

## 2020-06-11 RX ADMIN — INSULIN LISPRO SCH UNITS: 100 INJECTION, SOLUTION INTRAVENOUS; SUBCUTANEOUS at 00:00

## 2020-06-11 RX ADMIN — DILTIAZEM HYDROCHLORIDE SCH MLS/HR: 5 INJECTION INTRAVENOUS at 10:04

## 2020-06-11 RX ADMIN — DILTIAZEM HYDROCHLORIDE SCH MLS/HR: 5 INJECTION INTRAVENOUS at 09:23

## 2020-06-11 RX ADMIN — FENTANYL CITRATE PRN MCG: 50 INJECTION INTRAMUSCULAR; INTRAVENOUS at 22:50

## 2020-06-11 RX ADMIN — FENTANYL CITRATE PRN MCG: 50 INJECTION INTRAMUSCULAR; INTRAVENOUS at 03:19

## 2020-06-11 RX ADMIN — METOPROLOL TARTRATE PRN MG: 5 INJECTION INTRAVENOUS at 10:03

## 2020-06-11 RX ADMIN — INSULIN LISPRO SCH UNITS: 100 INJECTION, SOLUTION INTRAVENOUS; SUBCUTANEOUS at 06:10

## 2020-06-11 NOTE — PDOC
SURGICAL PROGRESS NOTE


Subjective


Pt working with therapy in bed and off vent


Vital Signs





Vital Signs








  Date Time  Temp Pulse Resp B/P (MAP) Pulse Ox O2 Delivery O2 Flow Rate FiO2


 


6/11/20 14:33  136  161/73    


 


6/11/20 14:33   46  95 Tracheal Collar  


 


6/11/20 14:00       10.0 


 


6/11/20 12:00 98.4       





 98.4       








I&O











Intake and Output 


 


 6/11/20





 07:00


 


Intake Total 4026 ml


 


Output Total 2415 ml


 


Balance 1611 ml


 


 


 


IV Total 3626 ml


 


Blood Product IV Normal Saline Flush 400 ml


 


Output Urine Total 1780 ml


 


Drainage Total 635 ml








General:  Alert, Cooperative, No acute distress


Labs





Laboratory Tests








Test


 6/9/20


18:14 6/9/20


23:54 6/10/20


05:50 6/10/20


05:54


 


Glucose (Fingerstick)


 158 mg/dL


(70-99) 116 mg/dL


(70-99) 


 152 mg/dL


(70-99)


 


White Blood Count


 


 


 6.8 x10^3/uL


(4.0-11.0) 





 


Red Blood Count


 


 


 2.67 x10^6/uL


(3.50-5.40) 





 


Hemoglobin


 


 


 7.8 g/dL


(12.0-15.5) 





 


Hematocrit


 


 


 24.0 %


(36.0-47.0) 





 


Mean Corpuscular Volume   90 fL ()  


 


Mean Corpuscular Hemoglobin   29 pg (25-35)  


 


Mean Corpuscular Hemoglobin


Concent 


 


 33 g/dL


(31-37) 





 


Red Cell Distribution Width


 


 


 18.0 %


(11.5-14.5) 





 


Platelet Count


 


 


 279 x10^3/uL


(140-400) 





 


Neutrophils (%) (Auto)   71 % (31-73)  


 


Lymphocytes (%) (Auto)   20 % (24-48)  


 


Monocytes (%) (Auto)   7 % (0-9)  


 


Eosinophils (%) (Auto)   2 % (0-3)  


 


Basophils (%) (Auto)   0 % (0-3)  


 


Neutrophils # (Auto)


 


 


 4.8 x10^3/uL


(1.8-7.7) 





 


Lymphocytes # (Auto)


 


 


 1.3 x10^3/uL


(1.0-4.8) 





 


Monocytes # (Auto)


 


 


 0.5 x10^3/uL


(0.0-1.1) 





 


Eosinophils # (Auto)


 


 


 0.1 x10^3/uL


(0.0-0.7) 





 


Basophils # (Auto)


 


 


 0.0 x10^3/uL


(0.0-0.2) 





 


Sodium Level


 


 


 147 mmol/L


(136-145) 





 


Potassium Level


 


 


 3.9 mmol/L


(3.5-5.1) 





 


Chloride Level


 


 


 117 mmol/L


() 





 


Carbon Dioxide Level


 


 


 23 mmol/L


(21-32) 





 


Anion Gap   7 (6-14)  


 


Blood Urea Nitrogen


 


 


 38 mg/dL


(7-20) 





 


Creatinine


 


 


 1.0 mg/dL


(0.6-1.0) 





 


Estimated GFR


(Cockcroft-Gault) 


 


 58.9 


 





 


Glucose Level


 


 


 164 mg/dL


(70-99) 





 


Calcium Level


 


 


 10.0 mg/dL


(8.5-10.1) 





 


Test


 6/10/20


18:37 6/10/20


23:30 6/11/20


06:02 6/11/20


06:15


 


Glucose (Fingerstick)


 152 mg/dL


(70-99) 145 mg/dL


(70-99) 167 mg/dL


(70-99) 





 


White Blood Count


 


 


 


 9.8 x10^3/uL


(4.0-11.0)


 


Red Blood Count


 


 


 


 2.90 x10^6/uL


(3.50-5.40)


 


Hemoglobin


 


 


 


 8.6 g/dL


(12.0-15.5)


 


Hematocrit


 


 


 


 26.2 %


(36.0-47.0)


 


Mean Corpuscular Volume    91 fL () 


 


Mean Corpuscular Hemoglobin    30 pg (25-35) 


 


Mean Corpuscular Hemoglobin


Concent 


 


 


 33 g/dL


(31-37)


 


Red Cell Distribution Width


 


 


 


 17.4 %


(11.5-14.5)


 


Platelet Count


 


 


 


 335 x10^3/uL


(140-400)


 


Neutrophils (%) (Auto)    70 % (31-73) 


 


Lymphocytes (%) (Auto)    24 % (24-48) 


 


Monocytes (%) (Auto)    5 % (0-9) 


 


Eosinophils (%) (Auto)    1 % (0-3) 


 


Basophils (%) (Auto)    0 % (0-3) 


 


Neutrophils # (Auto)


 


 


 


 6.9 x10^3/uL


(1.8-7.7)


 


Lymphocytes # (Auto)


 


 


 


 2.4 x10^3/uL


(1.0-4.8)


 


Monocytes # (Auto)


 


 


 


 0.5 x10^3/uL


(0.0-1.1)


 


Eosinophils # (Auto)


 


 


 


 0.1 x10^3/uL


(0.0-0.7)


 


Basophils # (Auto)


 


 


 


 0.0 x10^3/uL


(0.0-0.2)


 


Sodium Level


 


 


 


 147 mmol/L


(136-145)


 


Potassium Level


 


 


 


 4.1 mmol/L


(3.5-5.1)


 


Chloride Level


 


 


 


 115 mmol/L


()


 


Carbon Dioxide Level


 


 


 


 22 mmol/L


(21-32)


 


Anion Gap    10 (6-14) 


 


Blood Urea Nitrogen


 


 


 


 32 mg/dL


(7-20)


 


Creatinine


 


 


 


 0.9 mg/dL


(0.6-1.0)


 


Estimated GFR


(Cockcroft-Gault) 


 


 


 66.5 





 


BUN/Creatinine Ratio    36 (6-20) 


 


Glucose Level


 


 


 


 181 mg/dL


(70-99)


 


Calcium Level


 


 


 


 9.9 mg/dL


(8.5-10.1)


 


Phosphorus Level


 


 


 


 4.0 mg/dL


(2.6-4.7)


 


Magnesium Level


 


 


 


 1.8 mg/dL


(1.8-2.4)


 


Total Bilirubin


 


 


 


 0.4 mg/dL


(0.2-1.0)


 


Aspartate Amino Transf


(AST/SGOT) 


 


 


 21 U/L (15-37) 





 


Alanine Aminotransferase


(ALT/SGPT) 


 


 


 17 U/L (14-59) 





 


Alkaline Phosphatase


 


 


 


 91 U/L


()


 


Total Protein


 


 


 


 5.2 g/dL


(6.4-8.2)


 


Albumin


 


 


 


 1.3 g/dL


(3.4-5.0)


 


Albumin/Globulin Ratio    0.3 (1.0-1.7) 


 


Triglycerides Level


 


 


 


 203 mg/dL


(0-150)


 


Test


 6/11/20


11:32 


 


 





 


Glucose (Fingerstick)


 163 mg/dL


(70-99) 


 


 











Laboratory Tests








Test


 6/10/20


18:37 6/10/20


23:30 6/11/20


06:02 6/11/20


06:15


 


Glucose (Fingerstick)


 152 mg/dL


(70-99) 145 mg/dL


(70-99) 167 mg/dL


(70-99) 





 


White Blood Count


 


 


 


 9.8 x10^3/uL


(4.0-11.0)


 


Red Blood Count


 


 


 


 2.90 x10^6/uL


(3.50-5.40)


 


Hemoglobin


 


 


 


 8.6 g/dL


(12.0-15.5)


 


Hematocrit


 


 


 


 26.2 %


(36.0-47.0)


 


Mean Corpuscular Volume    91 fL () 


 


Mean Corpuscular Hemoglobin    30 pg (25-35) 


 


Mean Corpuscular Hemoglobin


Concent 


 


 


 33 g/dL


(31-37)


 


Red Cell Distribution Width


 


 


 


 17.4 %


(11.5-14.5)


 


Platelet Count


 


 


 


 335 x10^3/uL


(140-400)


 


Neutrophils (%) (Auto)    70 % (31-73) 


 


Lymphocytes (%) (Auto)    24 % (24-48) 


 


Monocytes (%) (Auto)    5 % (0-9) 


 


Eosinophils (%) (Auto)    1 % (0-3) 


 


Basophils (%) (Auto)    0 % (0-3) 


 


Neutrophils # (Auto)


 


 


 


 6.9 x10^3/uL


(1.8-7.7)


 


Lymphocytes # (Auto)


 


 


 


 2.4 x10^3/uL


(1.0-4.8)


 


Monocytes # (Auto)


 


 


 


 0.5 x10^3/uL


(0.0-1.1)


 


Eosinophils # (Auto)


 


 


 


 0.1 x10^3/uL


(0.0-0.7)


 


Basophils # (Auto)


 


 


 


 0.0 x10^3/uL


(0.0-0.2)


 


Sodium Level


 


 


 


 147 mmol/L


(136-145)


 


Potassium Level


 


 


 


 4.1 mmol/L


(3.5-5.1)


 


Chloride Level


 


 


 


 115 mmol/L


()


 


Carbon Dioxide Level


 


 


 


 22 mmol/L


(21-32)


 


Anion Gap    10 (6-14) 


 


Blood Urea Nitrogen


 


 


 


 32 mg/dL


(7-20)


 


Creatinine


 


 


 


 0.9 mg/dL


(0.6-1.0)


 


Estimated GFR


(Cockcroft-Gault) 


 


 


 66.5 





 


BUN/Creatinine Ratio    36 (6-20) 


 


Glucose Level


 


 


 


 181 mg/dL


(70-99)


 


Calcium Level


 


 


 


 9.9 mg/dL


(8.5-10.1)


 


Phosphorus Level


 


 


 


 4.0 mg/dL


(2.6-4.7)


 


Magnesium Level


 


 


 


 1.8 mg/dL


(1.8-2.4)


 


Total Bilirubin


 


 


 


 0.4 mg/dL


(0.2-1.0)


 


Aspartate Amino Transf


(AST/SGOT) 


 


 


 21 U/L (15-37) 





 


Alanine Aminotransferase


(ALT/SGPT) 


 


 


 17 U/L (14-59) 





 


Alkaline Phosphatase


 


 


 


 91 U/L


()


 


Total Protein


 


 


 


 5.2 g/dL


(6.4-8.2)


 


Albumin


 


 


 


 1.3 g/dL


(3.4-5.0)


 


Albumin/Globulin Ratio    0.3 (1.0-1.7) 


 


Triglycerides Level


 


 


 


 203 mg/dL


(0-150)


 


Test


 6/11/20


11:32 


 


 





 


Glucose (Fingerstick)


 163 mg/dL


(70-99) 


 


 











Problem List


Problems


Medical Problems:


(1) Acute pancreatitis


Status: Acute  





(2) Cholelithiasis


Status: Acute  








Assessment/Plan


severe pancreatitis


cont supportive care


tentatively plan surgical intervention in a few weeks, if continued improvement.





Justicifation of Admission Dx:


Justifications for Admission:


Justification of Admission Dx:  Yes











GAMAL ZHOU MD             Jun 11, 2020 14:54

## 2020-06-11 NOTE — PDOC
PULMONARY PROGRESS NOTES


Subjective


Patient intubated on 3/23 , s/p trach 4/6, 


transferred back to ICU 6/5 for syncope with collapse


developed anemia, blood drainage from RLQ abdomen drain site, and surrounding 

firmness  / developed septic shock 6/7 from abdomen source, required levo 6/7


s/p 3 new drains 6/7 with brown color drainage


was place on AVAPS 6/7 post  


continues to have Dark blood from ROBERT drains, now Trach sheild 60%, off pressor 

today


Vitals





Vital Signs








  Date Time  Temp Pulse Resp B/P (MAP) Pulse Ox O2 Delivery O2 Flow Rate FiO2


 


6/11/20 09:00  134 42 159/77 (104) 96 Tracheal Collar 10.0 


 


6/11/20 08:00 98.6       





 98.6       








ROS:  No Nausea, No Chest Pain, No Abdominal Pain, No Increase Cough


General:  Alert, No acute distress


Lungs:  Other (diminshed in bases, Rhonci in LLL)


Cardiovascular:  S1, S2


Abdomen:  Soft, Non-tender, Other (bleeding from Sx. site RLQ and firmness)


Extremities:  Other (+1 BLE edema)


Skin:  Warm, Dry


Labs





Laboratory Tests








Test


 6/9/20


12:16 6/9/20


18:14 6/9/20


23:54 6/10/20


05:50


 


Glucose (Fingerstick)


 140 mg/dL


(70-99) 158 mg/dL


(70-99) 116 mg/dL


(70-99) 





 


White Blood Count


 


 


 


 6.8 x10^3/uL


(4.0-11.0)


 


Red Blood Count


 


 


 


 2.67 x10^6/uL


(3.50-5.40)


 


Hemoglobin


 


 


 


 7.8 g/dL


(12.0-15.5)


 


Hematocrit


 


 


 


 24.0 %


(36.0-47.0)


 


Mean Corpuscular Volume    90 fL () 


 


Mean Corpuscular Hemoglobin    29 pg (25-35) 


 


Mean Corpuscular Hemoglobin


Concent 


 


 


 33 g/dL


(31-37)


 


Red Cell Distribution Width


 


 


 


 18.0 %


(11.5-14.5)


 


Platelet Count


 


 


 


 279 x10^3/uL


(140-400)


 


Neutrophils (%) (Auto)    71 % (31-73) 


 


Lymphocytes (%) (Auto)    20 % (24-48) 


 


Monocytes (%) (Auto)    7 % (0-9) 


 


Eosinophils (%) (Auto)    2 % (0-3) 


 


Basophils (%) (Auto)    0 % (0-3) 


 


Neutrophils # (Auto)


 


 


 


 4.8 x10^3/uL


(1.8-7.7)


 


Lymphocytes # (Auto)


 


 


 


 1.3 x10^3/uL


(1.0-4.8)


 


Monocytes # (Auto)


 


 


 


 0.5 x10^3/uL


(0.0-1.1)


 


Eosinophils # (Auto)


 


 


 


 0.1 x10^3/uL


(0.0-0.7)


 


Basophils # (Auto)


 


 


 


 0.0 x10^3/uL


(0.0-0.2)


 


Sodium Level


 


 


 


 147 mmol/L


(136-145)


 


Potassium Level


 


 


 


 3.9 mmol/L


(3.5-5.1)


 


Chloride Level


 


 


 


 117 mmol/L


()


 


Carbon Dioxide Level


 


 


 


 23 mmol/L


(21-32)


 


Anion Gap    7 (6-14) 


 


Blood Urea Nitrogen


 


 


 


 38 mg/dL


(7-20)


 


Creatinine


 


 


 


 1.0 mg/dL


(0.6-1.0)


 


Estimated GFR


(Cockcroft-Gault) 


 


 


 58.9 





 


Glucose Level


 


 


 


 164 mg/dL


(70-99)


 


Calcium Level


 


 


 


 10.0 mg/dL


(8.5-10.1)


 


Test


 6/10/20


05:54 6/10/20


18:37 6/10/20


23:30 6/11/20


06:02


 


Glucose (Fingerstick)


 152 mg/dL


(70-99) 152 mg/dL


(70-99) 145 mg/dL


(70-99) 167 mg/dL


(70-99)


 


Test


 6/11/20


06:15 


 


 





 


Sodium Level


 147 mmol/L


(136-145) 


 


 





 


Potassium Level


 4.1 mmol/L


(3.5-5.1) 


 


 





 


Chloride Level


 115 mmol/L


() 


 


 





 


Carbon Dioxide Level


 22 mmol/L


(21-32) 


 


 





 


Anion Gap 10 (6-14)    


 


Blood Urea Nitrogen


 32 mg/dL


(7-20) 


 


 





 


Creatinine


 0.9 mg/dL


(0.6-1.0) 


 


 





 


Estimated GFR


(Cockcroft-Gault) 66.5 


 


 


 





 


BUN/Creatinine Ratio 36 (6-20)    


 


Glucose Level


 181 mg/dL


(70-99) 


 


 





 


Calcium Level


 9.9 mg/dL


(8.5-10.1) 


 


 





 


Phosphorus Level


 4.0 mg/dL


(2.6-4.7) 


 


 





 


Magnesium Level


 1.8 mg/dL


(1.8-2.4) 


 


 





 


Total Bilirubin


 0.4 mg/dL


(0.2-1.0) 


 


 





 


Aspartate Amino Transf


(AST/SGOT) 21 U/L (15-37) 


 


 


 





 


Alanine Aminotransferase


(ALT/SGPT) 17 U/L (14-59) 


 


 


 





 


Alkaline Phosphatase


 91 U/L


() 


 


 





 


Total Protein


 5.2 g/dL


(6.4-8.2) 


 


 





 


Albumin


 1.3 g/dL


(3.4-5.0) 


 


 





 


Albumin/Globulin Ratio 0.3 (1.0-1.7)    


 


Triglycerides Level


 203 mg/dL


(0-150) 


 


 











Laboratory Tests








Test


 6/10/20


18:37 6/10/20


23:30 6/11/20


06:02 6/11/20


06:15


 


Glucose (Fingerstick)


 152 mg/dL


(70-99) 145 mg/dL


(70-99) 167 mg/dL


(70-99) 





 


Sodium Level


 


 


 


 147 mmol/L


(136-145)


 


Potassium Level


 


 


 


 4.1 mmol/L


(3.5-5.1)


 


Chloride Level


 


 


 


 115 mmol/L


()


 


Carbon Dioxide Level


 


 


 


 22 mmol/L


(21-32)


 


Anion Gap    10 (6-14) 


 


Blood Urea Nitrogen


 


 


 


 32 mg/dL


(7-20)


 


Creatinine


 


 


 


 0.9 mg/dL


(0.6-1.0)


 


Estimated GFR


(Cockcroft-Gault) 


 


 


 66.5 





 


BUN/Creatinine Ratio    36 (6-20) 


 


Glucose Level


 


 


 


 181 mg/dL


(70-99)


 


Calcium Level


 


 


 


 9.9 mg/dL


(8.5-10.1)


 


Phosphorus Level


 


 


 


 4.0 mg/dL


(2.6-4.7)


 


Magnesium Level


 


 


 


 1.8 mg/dL


(1.8-2.4)


 


Total Bilirubin


 


 


 


 0.4 mg/dL


(0.2-1.0)


 


Aspartate Amino Transf


(AST/SGOT) 


 


 


 21 U/L (15-37) 





 


Alanine Aminotransferase


(ALT/SGPT) 


 


 


 17 U/L (14-59) 





 


Alkaline Phosphatase


 


 


 


 91 U/L


()


 


Total Protein


 


 


 


 5.2 g/dL


(6.4-8.2)


 


Albumin


 


 


 


 1.3 g/dL


(3.4-5.0)


 


Albumin/Globulin Ratio    0.3 (1.0-1.7) 


 


Triglycerides Level


 


 


 


 203 mg/dL


(0-150)








Medications





Active Scripts








 Medications  Dose


 Route/Sig


 Max Daily Dose Days Date Category


 


 Bisoprolol


 Fumarate 5 Mg


 Tablet  10 Mg


 PO DAILY


   3/16/20 Reported








Comments


CXR 6/6/2020








IMPRESSION: Lines and tubes as described above. Left greater than right 


lower lobe opacities most likely pulmonary edema and left greater than 


right mild pleural effusions are stable. Superimposed left lower lobe 


pneumonia is not excluded.





ct abdomen /pelvis 6/6


1. Removal of the percutaneous pigtail drainage catheters since the prior 


exam. Sequela of pancreatitis with extensive pseudocysts again 


demonstrated, the right-sided collections are slightly larger since the 


prior exam, the left-sided collections are stable. See above.


2. Moderate to large left pleural effusion with atelectasis and collapse 


of most of the left lower lobe, stable. Small right pleural effusion is 


stable.


3. Gallstone.





Impression


.


IMPRESSION:


1.  Acute hypoxemic respiratory failure secondary to ARDS status post trach, d

eveloped anemia 6/7, blood drainage from RLQ abdomen drain site, and surrounding

firmness  / developed septic shock 6/7 from abdomen source, required levo 6/7


s/p 3 new drains 6/7 with brown color drainage,was placed on AVAPS 6/7 post 

procedure after receiving narcotics. back on TS now at 60 % Bloody drainage 

again from ROBERT drains turning dark brown. 


2.  Gallstone pancreatitis, now with ongoing bleeding from prior drain. Anemic. 

s/p Tx 4 units, .


3.  septic shock, recurrent 6/7, source abdomen. , off levo now, 


4.  Acute kidney injury-, Off HD--renal function decling. suspect JED on CKD due

to hypotension , improved now


5.  Acute gallstone pancreatitis.


6.  Hypoalbuminemia.


7.  Moderate persistent effusions, s/p left thora  5/12, reaccumulation of left 

effusion. O2 requirement not changed. 


8.  New Fever- ,hypotension. suspect recurrent sepsis/ likely pancreatic source.

 Per ID, per surgery--


9.  Chronic anemia-- ongoing / s/p PRBC


10. Covid 19 testing negative


11. Moderate to large ascites-S/P paracentisis


12.S/P paracentisis with 4 liters removed on 4/15/20


13. S/P IR drain placement on 5/8/2020, removal


14. Depression/Anxiety 


1





Plan


.


1. back on trach shield--  PMV/capping as tolerated/ prn suction.


2. Follow all cultures/ PSA from pelvic drains. Abx per ID


3. Follow surgery recs-- Acute pancreatitis with persistent necrosis ---- 4/27 

status post ROBERT drain placement + C paropsilosis; 5/6 + yeast & high amylase; s/p

additional drains on 5/8, removal of drains and now increase pseudocyst and 

increase fluid collection. likely infected fluid/ sepsis. Initiated BS 

abx.follow surgery rec, planning surgical intervention next week


4. Follow ID recs for ABX----


5. Follow nephrology recs -----


6. Continue TPN 


7. monitor HGB,  4 units since 6/6--monitor HGB transfuse if less than 8.5 


8. hold off on thoracentesis . effusion small to moderate , monitor for now


10. s/p  thoracentesis, 5/12, 3 litres removed


1





     


DVT/GI PPX: protonix--- holding lovenox 2/2 anemia


PT/OT


D/W SHARYN STEWART MD                 Jun 11, 2020 09:56

## 2020-06-11 NOTE — PDOC
Infectious Disease Note


Subjective:


Subjective


Patient lethargic, weak


Tachycardic, Tachypneic, on trach shield


s/p PRBC 1 unit yesterday


Continues to have blood stained drainage from drains


T max 100.3





Vital Signs:


Vital Signs





Vital Signs








  Date Time  Temp Pulse Resp B/P (MAP) Pulse Ox O2 Delivery O2 Flow Rate FiO2


 


6/11/20 07:00  140 42 168/78 (108) 96 Tracheal Collar 10.0 


 


6/11/20 04:00 98.1       





 98.1       











Physical Exam:


PHYSICAL EXAM


GENERAL: Lethargic, opens eyes transiently


HEENT: NGT in place. Oral mucosa dry


NECK:  Tracheostomy 


LUNGS: Diminished aeration bases, no accessory muscle use 


HEART:  S1, S2, tachy, regular 


ABDOMEN: Mild distention, bowel sounds present, soft, grimaces to palpation 


Right lateral side drainage bag, 3 drains with bloodstained drainage


: [place] ( 6/ 7)


EXTREMITIES: Trace edema .no  cyanosis 


SKIN: no signs of gen rash 


CNS: Lethargic very weak


LUE-PICC (5/29) without signs of complications





Medications:


Inpatient Meds:





Current Medications








 Medications


  (Trade)  Dose


 Ordered  Sig/Yee  Start Time


 Stop Time Status Last Admin


Dose Admin


 


 Acetaminophen


  (Tylenol Supp)  650 mg  PRN Q6HRS  PRN  3/24/20 10:30


    6/10/20 22:16


650 MG


 


 Acetaminophen


  (Tylenol)  650 mg  PRN Q6HRS  PRN  3/21/20 03:36


 5/13/20 10:25 DC 4/16/20 19:56


650 MG


 


 Albumin Human  500 ml @ 


 125 mls/hr  1X  ONCE  6/7/20 08:15


 6/7/20 12:14 DC 6/7/20 08:10


125 MLS/HR


 


 Albuterol Sulfate


  (Ventolin Neb


 Soln)  2.5 mg  1X  ONCE  3/17/20 22:30


 3/17/20 22:31 DC 3/18/20 00:56


2.5 MG


 


 Alteplase,


 Recombinant


  (Cathflo For


 Central Catheter


 Clearance)  1 mg  1X  ONCE  4/24/20 10:45


 4/24/20 10:46 DC 4/24/20 11:44


1 MG


 


 Amino Acids/


 Glycerin/


 Electrolytes  1,000 ml @ 


 75 mls/hr  [id number]  4/20/20 21:15


   UNV  





 


 Artificial Tears


  (Artificial


 Tears)  1 drop  PRN Q15MIN  PRN  4/29/20 05:30


    5/29/20 10:08


1 DROP


 


 Atenolol


  (Tenormin)  100 mg  DAILY  3/17/20 09:00


 3/16/20 20:08 DC  





 


 Atropine Sulfate


  (ATROPINE 0.5mg


 SYRINGE)  0.5 mg  PRN Q5MIN  PRN  4/2/20 08:15


     





 


 Barium Sulfate


  (Varibar Thin


 Liquid Apple)  148 gm  1X  ONCE  5/26/20 11:45


 5/26/20 11:49 DC  





 


 Benzocaine


  (Hurricaine One)  1 spray  1X  ONCE  3/20/20 14:30


 3/20/20 14:31 DC 3/20/20 16:38


1 SPRAY


 


 Bisacodyl


  (Dulcolax Supp)  10 mg  STK-MED ONCE  4/27/20 10:59


 4/27/20 10:59 DC  





 


 Bumetanide


  (Bumex)  2 mg  DAILY  5/8/20 10:00


 5/18/20 17:15 DC 5/18/20 08:07


2 MG


 


 Bupivacaine HCl/


 Epinephrine Bitart


  (Sensorcain-Epi


 0.5%-1:523767 Mpf)  30 ml  STK-MED ONCE  4/27/20 10:58


 4/27/20 10:58 DC 4/27/20 12:01


7 ML


 


 Calcium Carbonate/


 Glycine


  (Tums)  500 mg  PRN [id number]  PRN  3/18/20 17:45


 5/13/20 10:25 DC  





 


 Calcium Chloride


 1000 mg/Sodium


 Chloride  110 ml @ 


 220 mls/hr  1X  ONCE  3/17/20 22:30


 3/17/20 22:59 DC 3/17/20 22:11


220 MLS/HR


 


 Calcium Chloride


 3000 mg/Sodium


 Chloride  1,030 ml @ 


 50 mls/hr  [id number]  3/19/20 08:00


 3/21/20 15:23 DC 3/21/20 02:17


50 MLS/HR


 


 Calcium Gluconate


  (Calcium


 Gluconate)  2,000 mg  1X  ONCE  3/19/20 02:15


 3/19/20 02:16 DC 3/19/20 02:19


2,000 MG


 


 Calcium Gluconate


 1000 mg/Sodium


 Chloride  110 ml @ 


 220 mls/hr  1X  ONCE  3/18/20 03:30


 3/18/20 03:59 DC 3/18/20 03:21


220 MLS/HR


 


 Calcium Gluconate


 2000 mg/Sodium


 Chloride  120 ml @ 


 220 mls/hr  1X  ONCE  3/18/20 07:30


 3/18/20 08:02 DC 3/18/20 09:05


220 MLS/HR


 


 Cefepime HCl


  (Maxipime)  2 gm  Q12HR  3/25/20 09:00


 4/8/20 09:58 DC 4/7/20 20:56


2 GM


 


 Cellulose


  (Surgicel


 Fibrillar 1x2)  1 each  STK-MED ONCE  4/6/20 11:00


 4/6/20 11:01 DC  





 


 Cellulose


  (Surgicel


 Hemostat 2x14)  1 each  STK-MED ONCE  4/27/20 10:58


 4/27/20 10:59 DC  





 


 Cellulose


  (Surgicel


 Hemostat 4x8)  1 each  STK-MED ONCE  4/27/20 10:58


 4/27/20 10:59 DC  





 


 Cyclobenzaprine


 HCl


  (Flexeril)  10 mg  PRN Q6HRS  PRN  4/30/20 10:45


     





 


 Daptomycin 410 mg/


 Sodium Chloride  50 ml @ 


 100 mls/hr  Q24H  6/7/20 14:00


 6/10/20 08:30 DC 6/9/20 13:33


100 MLS/HR


 


 Daptomycin 430 mg/


 Sodium Chloride  50 ml @ 


 100 mls/hr  Q24H  4/25/20 13:00


 4/30/20 20:58 DC 4/30/20 13:00


100 MLS/HR


 


 Daptomycin 450 mg/


 Sodium Chloride  50 ml @ 


 100 mls/hr  Q24H  5/17/20 09:00


 5/21/20 08:30 DC 5/20/20 09:25


100 MLS/HR


 


 Daptomycin 485 mg/


 Sodium Chloride  50 ml @ 


 100 mls/hr  Q24H  5/4/20 11:00


 5/12/20 07:44 DC 5/11/20 13:10


100 MLS/HR


 


 Daptomycin 500 mg/


 Sodium Chloride  50 ml @ 


 100 mls/hr  Q48H  3/25/20 08:30


 4/10/20 10:07 DC 4/10/20 09:57


100 MLS/HR


 


 Dexamethasone


 Sodium Phosphate


  (Decadron)  4 mg  STK-MED ONCE  4/27/20 10:56


 4/27/20 10:57 DC  





 


 Dexmedetomidine


 HCl 400 mcg/


 Sodium Chloride  100 ml @ 0


 mls/hr  CONT  PRN  4/2/20 08:15


 5/30/20 18:31 DC 5/30/20 12:57


8 MLS/HR


 


 Dextrose


  (Dextrose


 50%-Water Syringe)  12.5 gm  PRN Q15MIN  PRN  3/16/20 09:30


     





 


 Digoxin


  (Lanoxin)  125 mcg  1X  ONCE  3/19/20 18:00


 3/19/20 18:01 DC 3/19/20 17:10


125 MCG


 


 Diphenhydramine


 HCl


  (Benadryl)  25 mg  1X PRN  PRN  4/24/20 15:45


 4/25/20 15:44 DC  





 


 Duloxetine HCl


  (Cymbalta)  30 mg  DAILY  5/10/20 14:00


 5/13/20 10:25 DC 5/11/20 09:48


30 MG


 


 Enoxaparin Sodium


  (Lovenox 100mg


 Syringe)  100 mg  Q12HR  4/21/20 21:00


   UNV  





 


 Enoxaparin Sodium


  (Lovenox 40mg


 Syringe)  40 mg  Q24H  5/17/20 17:00


 6/7/20 06:50 DC 6/5/20 17:44


40 MG


 


 Etomidate


  (Amidate)  8 mg  1X  ONCE  3/23/20 08:30


 3/23/20 08:31 DC 3/23/20 08:33


8 MG


 


 Fentanyl


  (Duragesic 50mcg/


 Hr Patch)  1 patch  Q72H  6/4/20 21:00


    6/4/20 21:22


1 PATCH


 


 Fentanyl Citrate


  (Fentanyl 2ml


 Vial)  100 mcg  1X  ONCE  6/7/20 15:00


 6/7/20 15:01 DC 6/7/20 15:28


50 MCG


 


 Fentanyl Citrate


  (Fentanyl 5ml


 Vial)  250 mcg  1X  ONCE  5/8/20 09:15


 5/8/20 09:16 DC 5/8/20 09:30


50 MCG


 


 Flumazenil


  (Romazicon)  0.5 mg  STK-MED ONCE  6/7/20 14:48


 6/7/20 14:48 DC  





 


 Fluoxetine HCl


  (PROzac)  20 mg  QHS  6/4/20 21:00


    6/10/20 21:26


20 MG


 


 Furosemide


  (Lasix)  40 mg  DAILY  6/3/20 13:30


 6/7/20 09:12 DC 6/5/20 11:14


40 MG


 


 Haloperidol


 Lactate


  (Haldol Inj)  3 mg  1X  ONCE  5/4/20 14:30


 5/4/20 14:31 DC 5/4/20 14:37


3 MG


 


 Heparin Sodium


  (Porcine)


  (Hep Lock Adult)  500 unit  STK-MED ONCE  4/7/20 09:29


 4/7/20 09:30 DC  





 


 Heparin Sodium


  (Porcine)


  (Heparin Sodium)  5,000 unit  Q12HR  4/27/20 21:00


 5/7/20 09:59 DC 5/6/20 20:57


5,000 UNIT


 


 Heparin Sodium


  (Porcine) 1000


 unit/Sodium


 Chloride  1,001 ml @ 


 1,001 mls/hr  1X  ONCE  4/27/20 12:00


 4/27/20 12:59 DC  





 


 Hydromorphone HCl


  (Dilaudid


 Standard PCA)  12 mg  STK-MED ONCE  5/1/20 15:50


 5/12/20 11:24 DC  





 


 Hydromorphone HCl


  (Dilaudid)  1 mg  PRN Q4HRS  PRN  5/4/20 19:00


 5/18/20 17:10 DC 5/18/20 06:25


1 MG


 


 Info


  (CONTRAST GIVEN


 -- Rx MONITORING)  1 each  PRN DAILY  PRN  3/30/20 11:45


 4/1/20 11:44 DC  





 


 Info


  (Icu Electrolyte


 Protocol)  1 ea  CONT PRN  PRN  3/29/20 13:15


     





 


 Info


  (PHARMACY


 MONITORING -- do


 not chart)  1 each  PRN DAILY  PRN  4/24/20 15:45


 5/26/20 14:14 DC  





 


 Info


  (Tpn Per


 Pharmacy)  1 each  PRN DAILY  PRN  3/18/20 12:30


   UNV  





 


 Insulin Human


 Lispro


  (HumaLOG)  0-9 UNITS  Q6HRS  3/16/20 09:30


    6/11/20 06:10


4 UNITS


 


 Insulin Human


 Regular


  (HumuLIN R VIAL)  5 unit  1X  ONCE  3/17/20 22:30


 3/17/20 22:31 DC 3/17/20 22:14


5 UNIT


 


 Iohexol


  (Omnipaque 240


 Mg/ml)  30 ml  1X  ONCE  3/30/20 11:30


 3/30/20 11:33 DC 3/30/20 11:30


30 ML


 


 Iohexol


  (Omnipaque 300


 Mg/ml)  50 ml  STK-MED ONCE  4/27/20 10:58


 4/27/20 10:59 DC  





 


 Iohexol


  (Omnipaque 350


 Mg/ml)  90 ml  1X  ONCE  3/16/20 03:30


 3/16/20 03:31 DC 3/16/20 03:25


90 ML


 


 Ketorolac


 Tromethamine


  (Toradol 30mg


 Vial)  30 mg  1X  ONCE  3/16/20 03:00


 3/16/20 03:01 DC 3/16/20 02:54


30 MG


 


 Lidocaine HCl


  (Buffered


 Lidocaine 1%)  6 ml  1X  ONCE  5/12/20 14:15


 5/12/20 14:16 DC 5/12/20 14:15


3 ML


 


 Lidocaine HCl


  (Glydo


  (Lidocaine) Jelly)  1 thomas  1X  ONCE  3/20/20 14:30


 3/20/20 14:31 DC 3/20/20 16:38


1 THOMAS


 


 Lidocaine HCl


  (Lidocaine 1%


 20ml Vial)  20 ml  1X  ONCE  6/7/20 15:00


 6/7/20 15:01 DC 6/7/20 15:30


20 ML


 


 Lidocaine HCl


  (Xylocaine-Mpf


 1% 2ml Vial)  2 ml  PRN 1X  PRN  4/27/20 07:00


 4/28/20 06:59 DC  





 


 Linezolid/Dextrose  300 ml @ 


 300 mls/hr  Q12HR  5/17/20 09:00


 5/20/20 08:11 DC 5/19/20 21:08


300 MLS/HR


 


 Lorazepam


  (Ativan Inj)  0.25 mg  PRN Q4HRS  PRN  6/3/20 07:30


    6/11/20 05:53


0.25 MG


 


 Magnesium Sulfate  50 ml @ 25


 mls/hr  1X  ONCE  5/10/20 09:00


 5/10/20 10:59 DC 5/10/20 10:44


25 MLS/HR


 


 Meropenem 1 gm/


 Sodium Chloride  100 ml @ 


 200 mls/hr  Q12HR  6/7/20 21:00


    6/10/20 21:26


200 MLS/HR


 


 Meropenem 500 mg/


 Sodium Chloride  50 ml @ 


 100 mls/hr  Q6HRS  5/25/20 18:00


 5/27/20 09:59 DC 5/27/20 06:02


100 MLS/HR


 


 Metoclopramide HCl


  (Reglan Vial)  10 mg  PRN Q3HRS  PRN  5/9/20 16:45


    5/14/20 04:25


10 MG


 


 Metoprolol


 Tartrate


  (Lopressor Vial)  5 mg  PRN Q6HRS  PRN  6/10/20 09:00


     





 


 Metronidazole  100 ml @ 


 100 mls/hr  Q8HRS  4/14/20 10:00


 4/21/20 08:10 DC 4/21/20 06:04


100 MLS/HR


 


 Micafungin Sodium


 100 mg/Dextrose  100 ml @ 


 100 mls/hr  Q24H  6/7/20 11:00


    6/10/20 12:09


100 MLS/HR


 


 Midazolam HCl


  (Versed)  2 mg  1X  ONCE  6/7/20 15:00


 6/7/20 15:01 DC 6/7/20 15:28


1 MG


 


 Midazolam HCl 100


 mg/Sodium Chloride  100 ml @ 7


 mls/hr  CONT  PRN  3/28/20 16:00


 6/3/20 14:38 DC 4/8/20 15:35


7 MLS/HR


 


 Midazolam HCl 50


 mg/Sodium Chloride  50 ml @ 0


 mls/hr  CONT  PRN  3/23/20 08:15


 3/28/20 15:59 DC 3/26/20 22:39


7 MLS/HR


 


 Morphine Sulfate


  (Morphine


 Sulfate)  2 mg  PRN Q2HR  PRN  3/16/20 05:00


 3/17/20 14:15 DC 3/17/20 12:26


2 MG


 


 Multi-Ingred


 Cream/Lotion/Oil/


 Oint


  (Artificial


 Tears Eye


 Ointment)  1 thomas  PRN Q1HR  PRN  3/25/20 17:30


 6/3/20 14:39 DC 4/13/20 08:19


1 THOMAS


 


 Naloxone HCl


  (Narcan)  0.4 mg  STK-MED ONCE  6/7/20 14:48


 6/7/20 14:48 DC  





 


 Norepinephrine


 Bitartrate 8 mg/


 Dextrose  258 ml @ 


 13.332 mls/


 hr  CONT  PRN  6/7/20 06:30


    6/7/20 21:46


13.332 MLS/HR


 


 Ondansetron HCl


  (Zofran)  4 mg  STK-MED ONCE  4/27/20 10:56


 4/27/20 10:57 DC  





 


 Pantoprazole


 Sodium


  (PROTONIX VIAL


 for IV PUSH)  40 mg  DAILYAC  3/16/20 11:30


    6/10/20 08:17


40 MG


 


 Phenylephrine HCl


  (PHENYLEPHRINE


 in 0.9% NACL PF)  1 mg  STK-MED ONCE  4/27/20 12:34


 4/27/20 12:34 DC  





 


 Piperacillin Sod/


 Tazobactam Sod


 3.375 gm/Sodium


 Chloride  50 ml @ 


 100 mls/hr  Q6HRS  5/27/20 12:00


 6/4/20 07:26 DC 6/4/20 06:10


100 MLS/HR


 


 Piperacillin Sod/


 Tazobactam Sod


 4.5 gm/Sodium


 Chloride  100 ml @ 


 200 mls/hr  1X  ONCE  3/16/20 06:00


 3/16/20 06:29 DC 3/16/20 05:44


200 MLS/HR


 


 Potassium


 Chloride 110 meq/


 Magnesium Sulfate


 20 meq/


 Multivitamins 10


 ml/Chromium/


 Copper/Manganese/


 Seleni/Zn 1 ml/


 Insulin Human


 Regular 15 unit/


 Total Parenteral


 Nutrition/Amino


 Acids/Dextrose/


 Fat Emulsion


 Intravenous  1,800 ml @ 


 75 mls/hr  TPN  CONT  5/24/20 22:00


 5/25/20 21:59 DC 5/24/20 22:48


75 MLS/HR


 


 Potassium


 Chloride 15 meq/


 Bicarbonate


 Dialysis Soln w/


 out KCl  5,007.5 ml


  @ 1,000 mls/


 hr  Q5H1M  3/29/20 20:00


 4/2/20 13:08 DC 4/1/20 18:14


1,000 MLS/HR


 


 Potassium


 Chloride 20 meq/


 Bicarbonate


 Dialysis Soln w/


 out KCl  5,010 ml @ 


 1,000 mls/hr  Q5H1M  3/25/20 16:00


 3/29/20 19:59 DC 3/29/20 14:54


1,000 MLS/HR


 


 Potassium


 Chloride 40 meq/


 Potassium Acetate


 60 meq/Magnesium


 Sulfate 10 meq/


 Multivitamins 10


 ml/Chromium/


 Copper/Manganese/


 Seleni/Zn 1 ml/


 Insulin Human


 Regular 20 unit/


 Total Parenteral


 Nutrition/Amino


 Acids/Dextrose/


 Fat Emulsion


 Intravenous  1,800 ml @ 


 75 mls/hr  TPN  CONT  6/4/20 22:00


 6/5/20 21:59 DC 6/5/20 00:03


75 MLS/HR


 


 Potassium


 Chloride 70 meq/


 Magnesium Sulfate


 20 meq/


 Multivitamins 10


 ml/Chromium/


 Copper/Manganese/


 Seleni/Zn 1 ml/


 Insulin Human


 Regular 15 unit/


 Total Parenteral


 Nutrition/Amino


 Acids/Dextrose/


 Fat Emulsion


 Intravenous  1,800 ml @ 


 75 mls/hr  TPN  CONT  5/29/20 22:00


 5/30/20 21:59 DC 5/29/20 23:13


75 MLS/HR


 


 Potassium


 Chloride 75 meq/


 Magnesium Sulfate


 15 meq/


 Multivitamins 10


 ml/Chromium/


 Copper/Manganese/


 Seleni/Zn 0.5 ml/


 Insulin Human


 Regular 15 unit/


 Total Parenteral


 Nutrition/Amino


 Acids/Dextrose/


 Fat Emulsion


 Intravenous  1,920 ml @ 


 80 mls/hr  TPN  CONT  5/9/20 22:00


 5/10/20 21:59 DC 5/9/20 22:41


80 MLS/HR


 


 Potassium


 Chloride 75 meq/


 Magnesium Sulfate


 15 meq/Calcium


 Gluconate 8 meq/


 Multivitamins 10


 ml/Chromium/


 Copper/Manganese/


 Seleni/Zn 0.5 ml/


 Insulin Human


 Regular 15 unit/


 Total Parenteral


 Nutrition/Amino


 Acids/Dextrose/


 Fat Emulsion


 Intravenous  1,920 ml @ 


 80 mls/hr  TPN  CONT  5/7/20 22:00


 5/8/20 21:59 DC 5/7/20 22:28


80 MLS/HR


 


 Potassium


 Chloride 75 meq/


 Magnesium Sulfate


 15 meq/Calcium


 Gluconate 8 meq/


 Multivitamins 10


 ml/Chromium/


 Copper/Manganese/


 Seleni/Zn 0.5 ml/


 Insulin Human


 Regular 20 unit/


 Total Parenteral


 Nutrition/Amino


 Acids/Dextrose/


 Fat Emulsion


 Intravenous  1,920 ml @ 


 80 mls/hr  TPN  CONT  5/6/20 22:00


 5/7/20 21:59 DC 5/6/20 22:00


80 MLS/HR


 


 Potassium


 Chloride 75 meq/


 Magnesium Sulfate


 15 meq/Calcium


 Gluconate 8 meq/


 Multivitamins 10


 ml/Chromium/


 Copper/Manganese/


 Seleni/Zn 0.5 ml/


 Insulin Human


 Regular 25 unit/


 Total Parenteral


 Nutrition/Amino


 Acids/Dextrose/


 Fat Emulsion


 Intravenous  1,920 ml @ 


 80 mls/hr  TPN  CONT  5/4/20 22:00


 5/5/20 21:59 DC 5/4/20 23:08


80 MLS/HR


 


 Potassium


 Chloride 75 meq/


 Magnesium Sulfate


 20 meq/Calcium


 Gluconate 10 meq/


 Multivitamins 10


 ml/Chromium/


 Copper/Manganese/


 Seleni/Zn 0.5 ml/


 Insulin Human


 Regular 25 unit/


 Total Parenteral


 Nutrition/Amino


 Acids/Dextrose/


 Fat Emulsion


 Intravenous  1,920 ml @ 


 80 mls/hr  TPN  CONT  5/3/20 22:00


 5/4/20 21:59 DC 5/3/20 22:04


80 MLS/HR


 


 Potassium


 Chloride 75 meq/


 Magnesium Sulfate


 20 meq/Calcium


 Gluconate 10 meq/


 Multivitamins 10


 ml/Chromium/


 Copper/Manganese/


 Seleni/Zn 0.5 ml/


 Insulin Human


 Regular 30 unit/


 Total Parenteral


 Nutrition/Amino


 Acids/Dextrose/


 Fat Emulsion


 Intravenous  1,920 ml @ 


 80 mls/hr  TPN  CONT  5/2/20 22:00


 5/3/20 22:00 DC 5/2/20 21:51


80 MLS/HR


 


 Potassium


 Chloride 80 meq/


 Magnesium Sulfate


 20 meq/


 Multivitamins 10


 ml/Chromium/


 Copper/Manganese/


 Seleni/Zn 0.5 ml/


 Insulin Human


 Regular 15 unit/


 Total Parenteral


 Nutrition/Amino


 Acids/Dextrose/


 Fat Emulsion


 Intravenous  1,920 ml @ 


 80 mls/hr  TPN  CONT  5/12/20 22:00


 5/13/20 21:59 DC 5/12/20 21:40


80 MLS/HR


 


 Potassium


 Chloride 80 meq/


 Magnesium Sulfate


 20 meq/


 Multivitamins 10


 ml/Chromium/


 Copper/Manganese/


 Seleni/Zn 1 ml/


 Insulin Human


 Regular 15 unit/


 Total Parenteral


 Nutrition/Amino


 Acids/Dextrose/


 Fat Emulsion


 Intravenous  1,800 ml @ 


 75 mls/hr  TPN  CONT  5/31/20 22:00


 6/1/20 21:59 DC 5/31/20 21:54


75 MLS/HR


 


 Potassium


 Chloride 90 meq/


 Magnesium Sulfate


 20 meq/


 Multivitamins 10


 ml/Chromium/


 Copper/Manganese/


 Seleni/Zn 1 ml/


 Insulin Human


 Regular 15 unit/


 Total Parenteral


 Nutrition/Amino


 Acids/Dextrose/


 Fat Emulsion


 Intravenous  1,800 ml @ 


 75 mls/hr  TPN  CONT  5/20/20 22:00


 5/21/20 21:59 DC 5/20/20 22:28


75 MLS/HR


 


 Potassium


 Chloride 90 meq/


 Magnesium Sulfate


 20 meq/


 Multivitamins 10


 ml/Chromium/


 Copper/Manganese/


 Seleni/Zn 1 ml/


 Insulin Human


 Regular 20 unit/


 Total Parenteral


 Nutrition/Amino


 Acids/Dextrose/


 Fat Emulsion


 Intravenous  1,800 ml @ 


 75 mls/hr  TPN  CONT  6/3/20 22:00


 6/4/20 21:59 DC 6/3/20 23:13


75 MLS/HR


 


 Potassium


 Chloride/Water  100 ml @ 


 100 mls/hr  1X  ONCE  6/1/20 10:00


 6/1/20 10:59 DC 6/1/20 10:15


100 MLS/HR


 


 Potassium


 Phosphate 20 mmol/


 Sodium Chloride  106.6667


 ml @ 


 51.667 m...  1X  ONCE  3/25/20 13:00


 3/25/20 15:03 DC 3/25/20 12:51


51.667 MLS/HR


 


 Potassium Acetate


 30 meq/Magnesium


 Sulfate 20 meq/


 Calcium Gluconate


 10 meq/


 Multivitamins 10


 ml/Chromium/


 Copper/Manganese/


 Seleni/Zn 0.5 ml/


 Insulin Human


 Regular 30 unit/


 Potassium


 Chloride 30 meq/


 Total Parenteral


 Nutrition/Amino


 Acids/Dextrose/


 Fat Emulsion


 Intravenous  1,920 ml @ 


 80 mls/hr  TPN  CONT  5/1/20 22:00


 5/2/20 21:59 DC 5/1/20 22:34


80 MLS/HR


 


 Potassium Acetate


 40 meq/Magnesium


 Sulfate 5 meq/


 Multivitamins 10


 ml/Chromium/


 Copper/Manganese/


 Seleni/Zn 1 ml/


 Insulin Human


 Regular 30 unit/


 Total Parenteral


 Nutrition/Amino


 Acids/Dextrose/


 Fat Emulsion


 Intravenous  1,920 ml @ 


 80 mls/hr  TPN  CONT  6/10/20 22:00


 6/11/20 21:59  6/10/20 21:26


80 MLS/HR


 


 Potassium Acetate


 55 meq/Magnesium


 Sulfate 20 meq/


 Calcium Gluconate


 10 meq/


 Multivitamins 10


 ml/Chromium/


 Copper/Manganese/


 Seleni/Zn 0.5 ml/


 Insulin Human


 Regular 30 unit/


 Total Parenteral


 Nutrition/Amino


 Acids/Dextrose/


 Fat Emulsion


 Intravenous  1,920 ml @ 


 80 mls/hr  TPN  CONT  4/30/20 22:00


 5/1/20 21:59 DC 5/1/20 01:00


80 MLS/HR


 


 Potassium Acetate


 55 meq/Magnesium


 Sulfate 20 meq/


 Calcium Gluconate


 10 meq/


 Multivitamins 10


 ml/Chromium/


 Copper/Manganese/


 Seleni/Zn 0.5 ml/


 Insulin Human


 Regular 35 unit/


 Total Parenteral


 Nutrition/Amino


 Acids/Dextrose/


 Fat Emulsion


 Intravenous  1,920 ml @ 


 80 mls/hr  TPN  CONT  4/28/20 22:00


 4/29/20 21:59 DC 4/28/20 22:02


80 MLS/HR


 


 Potassium Acetate


 60 meq/Magnesium


 Sulfate 5 meq/


 Multivitamins 10


 ml/Chromium/


 Copper/Manganese/


 Seleni/Zn 1 ml/


 Insulin Human


 Regular 30 unit/


 Total Parenteral


 Nutrition/Amino


 Acids/Dextrose/


 Fat Emulsion


 Intravenous  1,920 ml @ 


 80 mls/hr  TPN  CONT  6/6/20 22:00


 6/7/20 21:59 DC 6/6/20 21:54


80 MLS/HR


 


 Potassium Acetate


 65 meq/Magnesium


 Sulfate 20 meq/


 Calcium Gluconate


 10 meq/


 Multivitamins 10


 ml/Chromium/


 Copper/Manganese/


 Seleni/Zn 0.5 ml/


 Insulin Human


 Regular 30 unit/


 Total Parenteral


 Nutrition/Amino


 Acids/Dextrose/


 Fat Emulsion


 Intravenous  1,920 ml @ 


 80 mls/hr  TPN  CONT  4/29/20 22:00


 4/30/20 21:59 DC 4/29/20 22:22


80 MLS/HR


 


 Potassium Acetate


 80 meq/Magnesium


 Sulfate 5 meq/


 Multivitamins 10


 ml/Chromium/


 Copper/Manganese/


 Seleni/Zn 1 ml/


 Insulin Human


 Regular 20 unit/


 Total Parenteral


 Nutrition/Amino


 Acids/Dextrose/


 Fat Emulsion


 Intravenous  1,920 ml @ 


 80 mls/hr  TPN  CONT  6/5/20 22:00


 6/6/20 21:59 DC 6/5/20 21:59


80 MLS/HR


 


 Prochlorperazine


 Edisylate


  (Compazine)  5 mg  PACU PRN  PRN  4/27/20 07:00


 4/28/20 06:59 DC  





 


 Propofol  20 ml @ As


 Directed  STK-MED ONCE  4/27/20 12:26


 4/27/20 12:27 DC  





 


 Ringer's Solution  1,000 ml @ 


 30 mls/hr  Q24H  4/27/20 07:00


 4/27/20 18:59 DC  





 


 Rocuronium Bromide


  (Zemuron)  50 mg  STK-MED ONCE  4/27/20 10:56


 4/27/20 10:57 DC  





 


 Saliva Substitute


  (Biotene


 Moisturizing


 Mouth)  2 spray  PRN Q15MIN  PRN  5/21/20 11:00


     





 


 Sevoflurane


  (Ultane)  60 ml  STK-MED ONCE  4/27/20 12:26


 4/27/20 12:27 DC  





 


 Sodium


 Bicarbonate 50


 meq/Sodium


 Chloride  1,050 ml @ 


 75 mls/hr  Q14H  3/18/20 07:30


 3/23/20 10:28 DC 3/22/20 21:10


75 MLS/HR


 


 Sodium Acetate 50


 meq/Potassium


 Acetate 55 meq/


 Magnesium Sulfate


 20 meq/Calcium


 Gluconate 10 meq/


 Multivitamins 10


 ml/Chromium/


 Copper/Manganese/


 Seleni/Zn 0.5 ml/


 Insulin Human


 Regular 35 unit/


 Total Parenteral


 Nutrition/Amino


 Acids/Dextrose/


 Fat Emulsion


 Intravenous  1,800 ml @ 


 75 mls/hr  TPN  CONT  4/25/20 22:00


 4/26/20 21:59 DC 4/25/20 22:03


75 MLS/HR


 


 Sodium Bicarbonate


  (Sodium Bicarb


 Adult 8.4% Syr)  50 meq  1X  ONCE  6/7/20 22:00


 6/7/20 22:01 DC 6/7/20 21:47


50 MEQ


 


 Sodium Chloride  500 ml @ 


 500 mls/hr  1X  ONCE  6/9/20 06:45


 6/9/20 07:44 DC 6/9/20 06:39


500 MLS/HR


 


 Sodium Chloride


  (Normal Saline


 Flush)  3 ml  QSHIFT  PRN  4/27/20 13:45


     





 


 Sodium Chloride


 90 meq/Calcium


 Gluconate 10 meq/


 Multivitamins 10


 ml/Chromium/


 Copper/Manganese/


 Seleni/Zn 0.5 ml/


 Total Parenteral


 Nutrition/Amino


 Acids/Dextrose/


 Fat Emulsion


 Intravenous  1,512 ml @ 


 63 mls/hr  TPN  CONT  3/18/20 22:00


 3/19/20 21:59 DC 3/18/20 22:06


63 MLS/HR


 


 Sodium Chloride


 90 meq/Calcium


 Gluconate 10 meq/


 Multivitamins 10


 ml/Chromium/


 Copper/Manganese/


 Seleni/Zn 1 ml/


 Total Parenteral


 Nutrition/Amino


 Acids/Dextrose/


 Fat Emulsion


 Intravenous  55.005 ml


  @ 2.292


 mls/hr  TPN  CONT  3/18/20 22:00


 3/18/20 12:33 DC  





 


 Sodium Chloride


 90 meq/Magnesium


 Sulfate 10 meq/


 Calcium Gluconate


 20 meq/


 Multivitamins 10


 ml/Chromium/


 Copper/Manganese/


 Seleni/Zn 0.5 ml/


 Total Parenteral


 Nutrition/Amino


 Acids/Dextrose/


 Fat Emulsion


 Intravenous  1,512 ml @ 


 63 mls/hr  TPN  CONT  3/19/20 22:00


 3/20/20 21:59 DC 3/19/20 22:25


63 MLS/HR


 


 Sodium Chloride


 90 meq/Magnesium


 Sulfate 12 meq/


 Calcium Gluconate


 15 meq/


 Multivitamins 10


 ml/Chromium/


 Copper/Manganese/


 Seleni/Zn 0.5 ml/


 Insulin Human


 Regular 25 unit/


 Total Parenteral


 Nutrition/Amino


 Acids/Dextrose/


 Fat Emulsion


 Intravenous  1,400 ml @ 


 58.333 mls/


 hr  TPN  CONT  4/8/20 22:00


 4/9/20 21:59 DC 4/8/20 21:41


58.333 MLS/HR


 


 Sodium Chloride


 90 meq/Potassium


 Chloride 15 meq/


 Magnesium Sulfate


 12 meq/Calcium


 Gluconate 15 meq/


 Multivitamins 10


 ml/Chromium/


 Copper/Manganese/


 Seleni/Zn 0.5 ml/


 Insulin Human


 Regular 25 unit/


 Total Parenteral


 Nutrition/Amino


 Acids/Dextrose/


 Fat Emulsion


 Intravenous  1,400 ml @ 


 58.333 mls/


 hr  TPN  CONT  4/7/20 22:00


 4/8/20 21:59 DC 4/7/20 22:13


58.333 MLS/HR


 


 Sodium Chloride


 90 meq/Potassium


 Chloride 15 meq/


 Potassium


 Phosphate 10 mmol/


 Magnesium Sulfate


 8 meq/Calcium


 Gluconate 15 meq/


 Multivitamins 10


 ml/Chromium/


 Copper/Manganese/


 Seleni/Zn 0.5 ml/


 Insulin Human


 Regular 25 unit/


 Total Parenteral


 Nutrition/Amino


 Acids/Dextrose/


 Fat Emulsion


 Intravenous  1,400 ml @ 


 58.333 mls/


 hr  TPN  CONT  4/5/20 22:00


 4/6/20 21:59 DC 4/5/20 21:20


58.333 MLS/HR


 


 Sodium Chloride


 90 meq/Potassium


 Chloride 15 meq/


 Potassium


 Phosphate 10 mmol/


 Magnesium Sulfate


 10 meq/Calcium


 Gluconate 20 meq/


 Multivitamins 10


 ml/Chromium/


 Copper/Manganese/


 Seleni/Zn 0.5 ml/


 Total Parenteral


 Nutrition/Amino


 Acids/Dextrose/


 Fat Emulsion


 Intravenous  1,400 ml @ 


 58.333 mls/


 hr  TPN  CONT  3/23/20 22:00


 3/24/20 21:59 DC 3/23/20 21:42


58.333 MLS/HR


 


 Sodium Chloride


 90 meq/Potassium


 Chloride 15 meq/


 Potassium


 Phosphate 10 mmol/


 Magnesium Sulfate


 12 meq/Calcium


 Gluconate 15 meq/


 Multivitamins 10


 ml/Chromium/


 Copper/Manganese/


 Seleni/Zn 0.5 ml/


 Insulin Human


 Regular 25 unit/


 Total Parenteral


 Nutrition/Amino


 Acids/Dextrose/


 Fat Emulsion


 Intravenous  1,400 ml @ 


 58.333 mls/


 hr  TPN  CONT  4/6/20 22:00


 4/7/20 21:59 DC 4/6/20 22:24


58.333 MLS/HR


 


 Sodium Chloride


 90 meq/Potassium


 Chloride 15 meq/


 Potassium


 Phosphate 15 mmol/


 Magnesium Sulfate


 10 meq/Calcium


 Gluconate 15 meq/


 Multivitamins 10


 ml/Chromium/


 Copper/Manganese/


 Seleni/Zn 0.5 ml/


 Total Parenteral


 Nutrition/Amino


 Acids/Dextrose/


 Fat Emulsion


 Intravenous  1,400 ml @ 


 58.333 mls/


 hr  TPN  CONT  3/24/20 22:00


 3/25/20 21:59 DC 3/24/20 22:17


58.333 MLS/HR


 


 Sodium Chloride


 90 meq/Potassium


 Chloride 15 meq/


 Potassium


 Phosphate 15 mmol/


 Magnesium Sulfate


 10 meq/Calcium


 Gluconate 20 meq/


 Multivitamins 10


 ml/Chromium/


 Copper/Manganese/


 Seleni/Zn 0.5 ml/


 Total Parenteral


 Nutrition/Amino


 Acids/Dextrose/


 Fat Emulsion


 Intravenous  1,200 ml @ 


 50 mls/hr  TPN  CONT  3/22/20 22:00


 3/22/20 14:17 DC  





 


 Sodium Chloride


 90 meq/Potassium


 Chloride 15 meq/


 Potassium


 Phosphate 18 mmol/


 Magnesium Sulfate


 8 meq/Calcium


 Gluconate 15 meq/


 Multivitamins 10


 ml/Chromium/


 Copper/Manganese/


 Seleni/Zn 0.5 ml/


 Insulin Human


 Regular 10 unit/


 Total Parenteral


 Nutrition/Amino


 Acids/Dextrose/


 Fat Emulsion


 Intravenous  1,400 ml @ 


 58.333 mls/


 hr  TPN  CONT  3/27/20 22:00


 3/28/20 21:59 DC 3/27/20 21:43


58.333 MLS/HR


 


 Sodium Chloride


 90 meq/Potassium


 Chloride 15 meq/


 Potassium


 Phosphate 18 mmol/


 Magnesium Sulfate


 8 meq/Calcium


 Gluconate 15 meq/


 Multivitamins 10


 ml/Chromium/


 Copper/Manganese/


 Seleni/Zn 0.5 ml/


 Insulin Human


 Regular 15 unit/


 Total Parenteral


 Nutrition/Amino


 Acids/Dextrose/


 Fat Emulsion


 Intravenous  1,400 ml @ 


 58.333 mls/


 hr  TPN  CONT  3/30/20 22:00


 3/31/20 21:59 DC 3/30/20 21:47


58.333 MLS/HR


 


 Sodium Chloride


 90 meq/Potassium


 Chloride 15 meq/


 Potassium


 Phosphate 18 mmol/


 Magnesium Sulfate


 8 meq/Calcium


 Gluconate 15 meq/


 Multivitamins 10


 ml/Chromium/


 Copper/Manganese/


 Seleni/Zn 0.5 ml/


 Insulin Human


 Regular 20 unit/


 Total Parenteral


 Nutrition/Amino


 Acids/Dextrose/


 Fat Emulsion


 Intravenous  1,400 ml @ 


 58.333 mls/


 hr  TPN  CONT  4/2/20 22:00


 4/3/20 21:59 DC 4/2/20 22:45


58.333 MLS/HR


 


 Sodium Chloride


 90 meq/Potassium


 Chloride 15 meq/


 Potassium


 Phosphate 18 mmol/


 Magnesium Sulfate


 8 meq/Calcium


 Gluconate 15 meq/


 Multivitamins 10


 ml/Chromium/


 Copper/Manganese/


 Seleni/Zn 0.5 ml/


 Total Parenteral


 Nutrition/Amino


 Acids/Dextrose/


 Fat Emulsion


 Intravenous  1,400 ml @ 


 58.333 mls/


 hr  TPN  CONT  3/26/20 22:00


 3/27/20 21:59 DC 3/26/20 22:00


58.333 MLS/HR


 


 Sodium Chloride


 90 meq/Potassium


 Phosphate 15 mmol/


 Magnesium Sulfate


 12 meq/Calcium


 Gluconate 15 meq/


 Multivitamins 10


 ml/Chromium/


 Copper/Manganese/


 Seleni/Zn 0.5 ml/


 Insulin Human


 Regular 30 unit/


 Total Parenteral


 Nutrition/Amino


 Acids/Dextrose/


 Fat Emulsion


 Intravenous  1,400 ml @ 


 58.333 mls/


 hr  TPN  CONT  4/10/20 22:00


 4/11/20 21:59 DC 4/10/20 21:49


58.333 MLS/HR


 


 Sodium Chloride


 90 meq/Potassium


 Phosphate 15 mmol/


 Magnesium Sulfate


 12 meq/Calcium


 Gluconate 15 meq/


 Multivitamins 10


 ml/Chromium/


 Copper/Manganese/


 Seleni/Zn 0.5 ml/


 Insulin Human


 Regular 40 unit/


 Total Parenteral


 Nutrition/Amino


 Acids/Dextrose/


 Fat Emulsion


 Intravenous  1,400 ml @ 


 58.333 mls/


 hr  TPN  CONT  4/11/20 22:00


 4/12/20 21:59 DC 4/11/20 21:21


58.333 MLS/HR


 


 Sodium Chloride


 90 meq/Potassium


 Phosphate 19 mmol/


 Magnesium Sulfate


 12 meq/Calcium


 Gluconate 15 meq/


 Multivitamins 10


 ml/Chromium/


 Copper/Manganese/


 Seleni/Zn 0.5 ml/


 Insulin Human


 Regular 40 unit/


 Total Parenteral


 Nutrition/Amino


 Acids/Dextrose/


 Fat Emulsion


 Intravenous  1,400 ml @ 


 58.333 mls/


 hr  TPN  CONT  4/12/20 22:00


 4/13/20 21:59 DC 4/12/20 21:54


58.333 MLS/HR


 


 Sodium Chloride


 90 meq/Potassium


 Phosphate 5 mmol/


 Magnesium Sulfate


 12 meq/Calcium


 Gluconate 15 meq/


 Multivitamins 10


 ml/Chromium/


 Copper/Manganese/


 Seleni/Zn 0.5 ml/


 Insulin Human


 Regular 30 unit/


 Total Parenteral


 Nutrition/Amino


 Acids/Dextrose/


 Fat Emulsion


 Intravenous  1,400 ml @ 


 58.333 mls/


 hr  TPN  CONT  4/9/20 22:00


 4/10/20 21:59 DC 4/9/20 22:08


58.333 MLS/HR


 


 Sodium Chloride


 100 meq/Potassium


 Chloride 40 meq/


 Magnesium Sulfate


 15 meq/Calcium


 Gluconate 15 meq/


 Multivitamins 10


 ml/Chromium/


 Copper/Manganese/


 Seleni/Zn 0.5 ml/


 Insulin Human


 Regular 35 unit/


 Total Parenteral


 Nutrition/Amino


 Acids/Dextrose/


 Fat Emulsion


 Intravenous  1,400 ml @ 


 58.333 mls/


 hr  TPN  CONT  4/19/20 22:00


 4/20/20 21:59 DC 4/19/20 22:46


58.333 MLS/HR


 


 Sodium Chloride


 100 meq/Potassium


 Chloride 40 meq/


 Magnesium Sulfate


 20 meq/Calcium


 Gluconate 10 meq/


 Multivitamins 10


 ml/Chromium/


 Copper/Manganese/


 Seleni/Zn 0.5 ml/


 Insulin Human


 Regular 35 unit/


 Total Parenteral


 Nutrition/Amino


 Acids/Dextrose/


 Fat Emulsion


 Intravenous  1,400 ml @ 


 58.333 mls/


 hr  TPN  CONT  4/23/20 22:00


 4/24/20 21:59 DC 4/24/20 00:06


58.333 MLS/HR


 


 Sodium Chloride


 100 meq/Potassium


 Chloride 40 meq/


 Magnesium Sulfate


 20 meq/Calcium


 Gluconate 15 meq/


 Multivitamins 10


 ml/Chromium/


 Copper/Manganese/


 Seleni/Zn 0.5 ml/


 Insulin Human


 Regular 35 unit/


 Total Parenteral


 Nutrition/Amino


 Acids/Dextrose/


 Fat Emulsion


 Intravenous  1,400 ml @ 


 58.333 mls/


 hr  TPN  CONT  4/22/20 22:00


 4/23/20 21:59 DC 4/22/20 22:27


58.333 MLS/HR


 


 Sodium Chloride


 100 meq/Potassium


 Phosphate 10 mmol/


 Magnesium Sulfate


 12 meq/Calcium


 Gluconate 15 meq/


 Multivitamins 10


 ml/Chromium/


 Copper/Manganese/


 Seleni/Zn 0.5 ml/


 Insulin Human


 Regular 35 unit/


 Potassium


 Chloride 20 meq/


 Total Parenteral


 Nutrition/Amino


 Acids/Dextrose/


 Fat Emulsion


 Intravenous  1,400 ml @ 


 58.333 mls/


 hr  TPN  CONT  4/16/20 22:00


 4/17/20 21:59 DC 4/16/20 22:10


58.333 MLS/HR


 


 Sodium Chloride


 100 meq/Potassium


 Phosphate 19 mmol/


 Magnesium Sulfate


 12 meq/Calcium


 Gluconate 15 meq/


 Multivitamins 10


 ml/Chromium/


 Copper/Manganese/


 Seleni/Zn 0.5 ml/


 Insulin Human


 Regular 40 unit/


 Potassium


 Chloride 20 meq/


 Total Parenteral


 Nutrition/Amino


 Acids/Dextrose/


 Fat Emulsion


 Intravenous  1,400 ml @ 


 58.333 mls/


 hr  TPN  CONT  4/15/20 22:00


 4/16/20 21:59 DC 4/15/20 21:20


58.333 MLS/HR


 


 Sodium Chloride


 100 meq/Potassium


 Phosphate 5 mmol/


 Magnesium Sulfate


 12 meq/Calcium


 Gluconate 15 meq/


 Multivitamins 10


 ml/Chromium/


 Copper/Manganese/


 Seleni/Zn 0.5 ml/


 Insulin Human


 Regular 35 unit/


 Potassium


 Chloride 20 meq/


 Total Parenteral


 Nutrition/Amino


 Acids/Dextrose/


 Fat Emulsion


 Intravenous  1,400 ml @ 


 58.333 mls/


 hr  TPN  CONT  4/17/20 22:00


 4/18/20 21:59 DC 4/17/20 22:59


58.333 MLS/HR


 


 Succinylcholine


 Chloride


  (Anectine)  120 mg  1X  ONCE  3/23/20 08:30


 3/23/20 08:31 DC 3/23/20 08:34


120 MG


 


 Vecuronium Bromide


  (Norcuron Bolus)  6 mg  PRN Q6HRS  PRN  5/7/20 19:15


 5/7/20 19:35 DC  














Labs:


Lab





Laboratory Tests








Test


 6/10/20


18:37 6/10/20


23:30 6/11/20


06:02 6/11/20


06:15


 


Glucose (Fingerstick)


 152 mg/dL


(70-99) 145 mg/dL


(70-99) 167 mg/dL


(70-99) 





 


Sodium Level


 


 


 


 147 mmol/L


(136-145)


 


Potassium Level


 


 


 


 4.1 mmol/L


(3.5-5.1)


 


Chloride Level


 


 


 


 115 mmol/L


()


 


Carbon Dioxide Level


 


 


 


 22 mmol/L


(21-32)


 


Anion Gap    10 (6-14) 


 


Blood Urea Nitrogen


 


 


 


 32 mg/dL


(7-20)


 


Creatinine


 


 


 


 0.9 mg/dL


(0.6-1.0)


 


Estimated GFR


(Cockcroft-Gault) 


 


 


 66.5 





 


BUN/Creatinine Ratio    36 (6-20) 


 


Glucose Level


 


 


 


 181 mg/dL


(70-99)


 


Calcium Level


 


 


 


 9.9 mg/dL


(8.5-10.1)


 


Phosphorus Level


 


 


 


 4.0 mg/dL


(2.6-4.7)


 


Magnesium Level


 


 


 


 1.8 mg/dL


(1.8-2.4)


 


Total Bilirubin


 


 


 


 0.4 mg/dL


(0.2-1.0)


 


Aspartate Amino Transf


(AST/SGOT) 


 


 


 21 U/L (15-37) 





 


Alanine Aminotransferase


(ALT/SGPT) 


 


 


 17 U/L (14-59) 





 


Alkaline Phosphatase


 


 


 


 91 U/L


()


 


Total Protein


 


 


 


 5.2 g/dL


(6.4-8.2)


 


Albumin


 


 


 


 1.3 g/dL


(3.4-5.0)


 


Albumin/Globulin Ratio    0.3 (1.0-1.7) 


 


Triglycerides Level


 


 


 


 203 mg/dL


(0-150)








Micro


        NEG ANSON 56


        PSEUDOMONAS AERUGINOSA


        ANTIBIOTIC                        RESULT          INTERPRETATION


        AMIKACIN                          <=16                  S


        AZTREONAM                         >16                   R


        CEFTAZIDIME                       >16                   R


        CIPROFLOXACIN                     <=0.25                S


        CEFEPIME                          16                    I


        GENTAMICIN                        <=2                   S


        LEVOFLOXACIN                      <=0.5                 S














                               ** CONTINUED ON NEXT PAGE **





----

--------------------------------------------------------------------------------


--------





RUN DATE: 06/10/20                  [facility] *LIVE*               

  PAGE 2   


RUN TIME: 1121                            Specimen Inquiry                    


 

--------------------------------------------------------------------------------


------------





SPEC: [id number]    PATIENT: JESENIA BEAN                [id number]  

(Continued)


-------------------------------------------------------------------

-------------------------








 

--------------------------------------------------------------------------------


------------





  Procedure                         Result                                      

         


-------------------------------------------

-------------------------------------------------





  ANTIMICROBIAL SUSCEPTIBILITY  Preliminary   (continued)


        MEROPENEM                         <=1                   S


        PIPERACILLIN/TAZOBACTAM           64                    S


        TOBRAMYCIN                        <=2                   S


        Unless otherwise specified, Testing Performed by:


        [address] [address]


        For Inquires, the Physician may contact the Microbiology


        department at [phone]





--------------------------------------------------------------------------------

------------





Objective:


Assessment:


Patient with prolonged hospitalization


Multiple medical problems


Possible surgical procedures








Fever 


Acute pancreatitis with persistent  necrosis


CT a/p 4/9


    Increased ascites. Persistent evidence of necrotizing pancreatitis with 

fluid and phlegmon


   at the pancreas


   4/27 status post ROBERT drain placement; yeast


   5/6 fluid  candida parapsilosis fluid amylase high


CT 6/7


IMPRESSION:


1.   Sequela of pancreatitis with extensive pseudocysts again 


demonstrated, the right-sided collections are slightly larger since the 


prior exam, the left-sided collections are stable. 


6/7 fluid cult PSAE (MDRO),yeast





Cholelithiasis with thickening of the gallbladder wall.


Leucocytosis 


JED,Hyperkalemia, Metabolic acidosis off dialysis


Acute hypoxic resp failure ,bilateral pleural effusion and atelectasis


hypocalcemia 


Prediabetes


HTN


s/p trach


 


Abdominal fluid culture 6 /7 MDRO Pseudomonas, yeast





Plan:


Plan of Care











Continue meropenem, has MDRP PSAE dose adjusted for renal function , June 7


cont  micafungin June 7


off  Dapto 6/10


Follow-up cultures fluid  for yeast


Monitor a.m. labs


General surgery following


Monitor drain output


Maintain aspiration precaution


Supportive care


Prognosis poor


Critically ill


Maintain contact isolation for MDRO Pseudomonas


D/w nursing











IVAN FRANZ MD           Jun 11, 2020 07:55

## 2020-06-11 NOTE — NUR
SS following up with discharge planning. SS reviewed pt chart and discussed with pt RN. Pt 
on trach shield, TPN, Meropenem, and Micafungin. Pt has three drains and is max assist with 
PT/OT. Pt declining to wear trach cap today. Per RN, possible surgery next week. SS will 
continue to follow for discharge planning.

## 2020-06-11 NOTE — PDOC
PROGRESS NOTES


Chief Complaint


Chief Complaint


A/P:


Acute hypoxic Respiratory failure requiring mechanical ventilation (now 

extubated for several days but still with tracheostomy)


Tracheostomy


bilateral pleural effusions/pulm edema 


Sepsis


Severe Acute gallstone pancreatitis (not a surgical candidate at this time) with

necrosis


Acute kidney failure now requiring dialysis


Salpingitis


Gallstones (Calculus of gallbladder with acute cholecystitis without 

obstruction)


HTN 


Leukocytosis 


Hypoxia


Uterine fibroid


Intractable pain


Intractable nausea


Covid 19 negative. 


Acute on chronic anemia 


EEG: No seizure activityFever  - better currently - intermittent could be from 

underlying pancreatitis blood cults 5/4 - neg so far


? Ileus with vomiting


Abd distention - U/S and CT reviewed s/p 0.4 L of opaque, debris-containing 

ascites was removed 5/6


Acute pancreatitis with persistent necrosis


- 4/27 status post ROBERT drain placement + C paropsilosis. s/p additional drains 5 /8


Anemia - S/p PRBCs


Cholelithiasis with thickening of the gallbladder wall.


Leucocytosis improving


JED, hyperkalemia, Metabolic acidosis off dialysis


Acute hypoxic resp failure ,bilateral pleural effusion and atelectasis


hypocalcemia 


Prediabetes


HTN


s/p trach


ESRD on HD


Hyperglycemia





FEN - 


PPX - SCDs, off lovenox currently


FULL CODE


Dispo - ICU, critically ill


CC time 49 minutes





History of Present Illness


History of Present Illness


6/8: IR placed drain on 6/7. 4u PRBC after Hb drop. Hb 8.8 today. Off Levophed 

this morning. T-max 100.3. Much more lethargic today. CXR with left sided dif

fuse infiltrates.


6/9: Tachycardic overnight into the 140s. NGT clamped. On BIPAP currently. 

Drains with serosanguinous discharge. WBC 8, Tmax 99.6F.


6/10: Seen on trach shield in ICU.  Hypertensive and tachycardic. Labs stable. 

blood stained drainage from drains. Afebrile.





Seen on trach shield in ICU. She is a bit confused, drowsy, but when sitting up 

is conversational and confusion somewhat clears. She is asking for more pain 

medication. Stable drains, still very tachy.Na 147





6/5/2020


Patient seen and examined on telemetry floor today


This morning I had a couple of discussions with case management


The issue is the patient will not wear her trach cap


Without that she cannot advance her diet and is currently supposed to be on 

honey thick liquids


I was going to talk to the patient about wearing her trach But when I arrived to

the room she was lying on the floor with several nurses and therapist around


Apparently she did walk to the end of the garsia then back and then collapsed onto

the floor


She had a bowel movement when this all happened


Appears to be critically ill again


Very shaky tremulous anxious has a stare in her eyes


We got her back in bed


I am extremely concerned about her long-term prognosis again











6/4/2020


Patient seen and examined in the ICU


She remains critically ill is is extremely weak


Chart reviewed


Discussed with RN


We decided to try some Prozac as she does seem depressed


On IV TPN


Still has NG tube








6/3/2020


Patient seen and examined in the ICU


She remains critically ill


We have been trying to hold off on her Ativan for the past 24 hours


She is a little more awake but shaky and agitated and anxious seems depressed


Discussed with RN


Chart reviewed








6/2/2020


Patient seen and examined in the ICU


She is a little more alert today but still quite ill


Appears clammy and pale and depressed/anxious


Discussed with RN


Chart reviewed











6/1/2020


Patient seen and examined in the ICU


She appears extremely ill


She is tachypneic at 35 respirations per minute and tachycardic at 132 bpm


She is extremely encephalopathic and shaky


She appears clammy


Chart reviewed


Discussed with RN


Prognosis extremely guarded at best








5/31/2020


Patient still in ICU


Resting with no apparent distress


Chart reviewed








5/30/2020


Patient seen and examined in the ICU


She is wiping her face with a cough


Discussed with RN


Chart reviewed


We hope to get her out of the ICU later today if possible








5/29/2020


Patient seen and examined in the ICU once again


She is back on NG suction


On IV Zosyn


Has IV TPN


Sedated with Precedex but anxious still


Appears somewhat clammy and pale


Chart reviewed


Discussed with RN


She remains critically ill














5/28/2020


Patient seen and examined in the ICU


She has an NG that is clamped we are hoping to start some clear liquids but she 

looks quite ill


She is on IV TPN


Meropenem changed to IV Zosyn (agree)


She has [place] to bedside drainage


She is semi-sedated with Precedex


Chart reviewed


Discussed with RN


She remains critically ill











Seen bedside. Hb 8. She was just a bit hypoxic, had a mucous plug suctioned. 

Able to vocalize well with speaking valve, tells me we are being very hard on 

her and she would like to be drugged back to sleep.  She wants to wake up and 

feel better. On trach shield, 


T max 100.4. afebrile this a.m.





5/26/2020


Patient seen and examined in the ICU


She is up in the chair very frail trying to talk a little shaky


Discussed with RN


Chart reviewed








5/25/2020


Patient seen and examined in the ICU


Patient up in the chair


Having a severe coughing episode with a lot of phlegm coming out of her 

tracheostomy


Discussed with RN


Discussed with physical therapy


Chart reviewed











5/24,.    feels well,  has been out of room in wheelchair


no complaint,    still weak,   some with not wanting to wear her valve on trach


cont other,   may be able to work with speech tomorrow,    videoswallow OK to 

try, 








5/23,  anxiety is up today,   she dislikes the valve still,    


shower and outside today


.   





5/22 she doesnt want to wear her passy-kristan valve,  discussed str and plan with 

her.


speech following,  needs swallow study,   but needs to wear her valve longer,  


cont current


able to walk some, walker 





5/21  stronger,   better,   


we discussed better oral care


she would like to try swallow study,  wants to try to eat,    speech is 

following








5/20/2020 


She remains in the ICU


sitting up and working with OT,   getting better


if limit pain meds, may do better off the vent, 





Nurses trying to suction her,  that is also improved, 


Chart reviewed


 








5/19/2020


Patient seen and examined in the ICU


She had an episode yesterday of tachycardia and severe agitation we gave her 

some Ativan


After that she seemed to have stroke symptoms but now that the Ativan has wore 

off her stroke symptoms have resolved


 


 


She is on IV meropenem and daptomycin and micafungin


Chart reviewed


Discussed with RN


Patient is still critically ill


 


BRIEF OPERATIVE NOTE


Pre-Op Diagnosis


Pancreatitis with pseudocysts, suspected infection


Post-Op Diagnosis


same


Procedure Performed


CT abdominal Drains x 3


Surgeon


Hardy


Anesthesia Type:  Conscious Sedation


Findings


3 abdominal drains, 14F, with turbid pancreatic fluid and necrotic debris in 

each.


Complications


No immediate








5/9: Patient today somewhat restless and having bilious secretions from ET tube,

imaging studies ordered, discussed with consultant. Pretty poor prognosis, 

hopefully is not a fistula, poor surgical candidate. 





5/10: Imaging with no acute events, she seems more stable today compared to 

yesterday. Encouraged as much activity as possible patient at high risk for 

severe depression.





Vitals


Vitals





Vital Signs








  Date Time  Temp Pulse Resp B/P (MAP) Pulse Ox O2 Delivery O2 Flow Rate FiO2


 


6/11/20 08:00      Trach Collar 10.0 


 


6/11/20 08:00        


 


6/11/20 08:00 98.6 138 48  97   





 98.6       











Physical Exam


Physical Exam


GENERAL: Lethargic, opens eyes transiently


HEENT: NGT in place. Oral mucosa dry


NECK:  Tracheostomy 


LUNGS: Diminished aeration bases, no accessory muscle use 


HEART:  S1, S2, tachy, regular 


ABDOMEN: Mild distention, bowel sounds present, soft, grimaces to palpation 


Right lateral side drainage bag, 3 drains with bloodstained drainage


: [place] ( 6/ 7)


EXTREMITIES: Trace edema .no  cyanosis 


SKIN: no signs of gen rash 


CNS: Lethargic very weak


LUE-PICC (5/29) without signs of complications


General:  Alert, Cooperative, mild distress


Heart:  Regular rate, Normal S1, Normal S2, No murmurs, Gallops


Lungs:  Other (diminshed in bases, Rhonci in LLL)


Abdomen:  Soft, Other (drains with brownish drainage, c/w pancreatic necrosis)


Extremities:  No clubbing, No cyanosis, No edema, Normal pulses, No 

tenderness/swelling


Skin:  Other (warm, dry)





Labs


LABS





Laboratory Tests








Test


 6/10/20


18:37 6/10/20


23:30 6/11/20


06:02 6/11/20


06:15


 


Glucose (Fingerstick)


 152 mg/dL


(70-99) 145 mg/dL


(70-99) 167 mg/dL


(70-99) 





 


Sodium Level


 


 


 


 147 mmol/L


(136-145)


 


Potassium Level


 


 


 


 4.1 mmol/L


(3.5-5.1)


 


Chloride Level


 


 


 


 115 mmol/L


()


 


Carbon Dioxide Level


 


 


 


 22 mmol/L


(21-32)


 


Anion Gap    10 (6-14) 


 


Blood Urea Nitrogen


 


 


 


 32 mg/dL


(7-20)


 


Creatinine


 


 


 


 0.9 mg/dL


(0.6-1.0)


 


Estimated GFR


(Cockcroft-Gault) 


 


 


 66.5 





 


BUN/Creatinine Ratio    36 (6-20) 


 


Glucose Level


 


 


 


 181 mg/dL


(70-99)


 


Calcium Level


 


 


 


 9.9 mg/dL


(8.5-10.1)


 


Phosphorus Level


 


 


 


 4.0 mg/dL


(2.6-4.7)


 


Magnesium Level


 


 


 


 1.8 mg/dL


(1.8-2.4)


 


Total Bilirubin


 


 


 


 0.4 mg/dL


(0.2-1.0)


 


Aspartate Amino Transf


(AST/SGOT) 


 


 


 21 U/L (15-37) 





 


Alanine Aminotransferase


(ALT/SGPT) 


 


 


 17 U/L (14-59) 





 


Alkaline Phosphatase


 


 


 


 91 U/L


()


 


Total Protein


 


 


 


 5.2 g/dL


(6.4-8.2)


 


Albumin


 


 


 


 1.3 g/dL


(3.4-5.0)


 


Albumin/Globulin Ratio    0.3 (1.0-1.7) 


 


Triglycerides Level


 


 


 


 203 mg/dL


(0-150)











Assessment and Plan


Assessmemt and Plan


Problems


Medical Problems:


(1) Acute pancreatitis


Status: Acute  





(2) Cholelithiasis


Status: Acute  











Comment


Review of Relevant


I have reviewed the following items josy (where applicable) has been applied.


Labs





Laboratory Tests








Test


 6/9/20


12:16 6/9/20


18:14 6/9/20


23:54 6/10/20


05:50


 


Glucose (Fingerstick)


 140 mg/dL


(70-99) 158 mg/dL


(70-99) 116 mg/dL


(70-99) 





 


White Blood Count


 


 


 


 6.8 x10^3/uL


(4.0-11.0)


 


Red Blood Count


 


 


 


 2.67 x10^6/uL


(3.50-5.40)


 


Hemoglobin


 


 


 


 7.8 g/dL


(12.0-15.5)


 


Hematocrit


 


 


 


 24.0 %


(36.0-47.0)


 


Mean Corpuscular Volume    90 fL () 


 


Mean Corpuscular Hemoglobin    29 pg (25-35) 


 


Mean Corpuscular Hemoglobin


Concent 


 


 


 33 g/dL


(31-37)


 


Red Cell Distribution Width


 


 


 


 18.0 %


(11.5-14.5)


 


Platelet Count


 


 


 


 279 x10^3/uL


(140-400)


 


Neutrophils (%) (Auto)    71 % (31-73) 


 


Lymphocytes (%) (Auto)    20 % (24-48) 


 


Monocytes (%) (Auto)    7 % (0-9) 


 


Eosinophils (%) (Auto)    2 % (0-3) 


 


Basophils (%) (Auto)    0 % (0-3) 


 


Neutrophils # (Auto)


 


 


 


 4.8 x10^3/uL


(1.8-7.7)


 


Lymphocytes # (Auto)


 


 


 


 1.3 x10^3/uL


(1.0-4.8)


 


Monocytes # (Auto)


 


 


 


 0.5 x10^3/uL


(0.0-1.1)


 


Eosinophils # (Auto)


 


 


 


 0.1 x10^3/uL


(0.0-0.7)


 


Basophils # (Auto)


 


 


 


 0.0 x10^3/uL


(0.0-0.2)


 


Sodium Level


 


 


 


 147 mmol/L


(136-145)


 


Potassium Level


 


 


 


 3.9 mmol/L


(3.5-5.1)


 


Chloride Level


 


 


 


 117 mmol/L


()


 


Carbon Dioxide Level


 


 


 


 23 mmol/L


(21-32)


 


Anion Gap    7 (6-14) 


 


Blood Urea Nitrogen


 


 


 


 38 mg/dL


(7-20)


 


Creatinine


 


 


 


 1.0 mg/dL


(0.6-1.0)


 


Estimated GFR


(Cockcroft-Gault) 


 


 


 58.9 





 


Glucose Level


 


 


 


 164 mg/dL


(70-99)


 


Calcium Level


 


 


 


 10.0 mg/dL


(8.5-10.1)


 


Test


 6/10/20


05:54 6/10/20


18:37 6/10/20


23:30 6/11/20


06:02


 


Glucose (Fingerstick)


 152 mg/dL


(70-99) 152 mg/dL


(70-99) 145 mg/dL


(70-99) 167 mg/dL


(70-99)


 


Test


 6/11/20


06:15 


 


 





 


Sodium Level


 147 mmol/L


(136-145) 


 


 





 


Potassium Level


 4.1 mmol/L


(3.5-5.1) 


 


 





 


Chloride Level


 115 mmol/L


() 


 


 





 


Carbon Dioxide Level


 22 mmol/L


(21-32) 


 


 





 


Anion Gap 10 (6-14)    


 


Blood Urea Nitrogen


 32 mg/dL


(7-20) 


 


 





 


Creatinine


 0.9 mg/dL


(0.6-1.0) 


 


 





 


Estimated GFR


(Cockcroft-Gault) 66.5 


 


 


 





 


BUN/Creatinine Ratio 36 (6-20)    


 


Glucose Level


 181 mg/dL


(70-99) 


 


 





 


Calcium Level


 9.9 mg/dL


(8.5-10.1) 


 


 





 


Phosphorus Level


 4.0 mg/dL


(2.6-4.7) 


 


 





 


Magnesium Level


 1.8 mg/dL


(1.8-2.4) 


 


 





 


Total Bilirubin


 0.4 mg/dL


(0.2-1.0) 


 


 





 


Aspartate Amino Transf


(AST/SGOT) 21 U/L (15-37) 


 


 


 





 


Alanine Aminotransferase


(ALT/SGPT) 17 U/L (14-59) 


 


 


 





 


Alkaline Phosphatase


 91 U/L


() 


 


 





 


Total Protein


 5.2 g/dL


(6.4-8.2) 


 


 





 


Albumin


 1.3 g/dL


(3.4-5.0) 


 


 





 


Albumin/Globulin Ratio 0.3 (1.0-1.7)    


 


Triglycerides Level


 203 mg/dL


(0-150) 


 


 











Laboratory Tests








Test


 6/10/20


18:37 6/10/20


23:30 6/11/20


06:02 6/11/20


06:15


 


Glucose (Fingerstick)


 152 mg/dL


(70-99) 145 mg/dL


(70-99) 167 mg/dL


(70-99) 





 


Sodium Level


 


 


 


 147 mmol/L


(136-145)


 


Potassium Level


 


 


 


 4.1 mmol/L


(3.5-5.1)


 


Chloride Level


 


 


 


 115 mmol/L


()


 


Carbon Dioxide Level


 


 


 


 22 mmol/L


(21-32)


 


Anion Gap    10 (6-14) 


 


Blood Urea Nitrogen


 


 


 


 32 mg/dL


(7-20)


 


Creatinine


 


 


 


 0.9 mg/dL


(0.6-1.0)


 


Estimated GFR


(Cockcroft-Gault) 


 


 


 66.5 





 


BUN/Creatinine Ratio    36 (6-20) 


 


Glucose Level


 


 


 


 181 mg/dL


(70-99)


 


Calcium Level


 


 


 


 9.9 mg/dL


(8.5-10.1)


 


Phosphorus Level


 


 


 


 4.0 mg/dL


(2.6-4.7)


 


Magnesium Level


 


 


 


 1.8 mg/dL


(1.8-2.4)


 


Total Bilirubin


 


 


 


 0.4 mg/dL


(0.2-1.0)


 


Aspartate Amino Transf


(AST/SGOT) 


 


 


 21 U/L (15-37) 





 


Alanine Aminotransferase


(ALT/SGPT) 


 


 


 17 U/L (14-59) 





 


Alkaline Phosphatase


 


 


 


 91 U/L


()


 


Total Protein


 


 


 


 5.2 g/dL


(6.4-8.2)


 


Albumin


 


 


 


 1.3 g/dL


(3.4-5.0)


 


Albumin/Globulin Ratio    0.3 (1.0-1.7) 


 


Triglycerides Level


 


 


 


 203 mg/dL


(0-150)








Microbiology


6/7/20 Gram Stain - Final, Resulted


         


6/7/20 Aerobic and Anaerobic Culture - Preliminary, Resulted


         


6/7/20 Antimicrobic Susceptibility - Preliminary, Resulted


         


6/7/20 Blood Culture - Preliminary, Resulted


         NO GROWTH AFTER 3 DAYS


6/7/20 Urine Culture - Final, Complete


         


5/30/20 Gram Stain - Final, Complete


          


5/30/20 Aerobic Culture - Final, Complete


Medications





Current Medications


Sodium Chloride 1,000 ml @  1,000 mls/hr Q1H IV  Last administered on 3/16/20at 

03:00;  Start 3/16/20 at 03:00;  Stop 3/16/20 at 03:59;  Status DC


Ondansetron HCl (Zofran) 4 mg 1X  ONCE IVP  Last administered on 3/16/20at 

03:27;  Start 3/16/20 at 03:00;  Stop 3/16/20 at 03:01;  Status DC


Morphine Sulfate (Morphine Sulfate) 4 mg 1X  ONCE IV ;  Start 3/16/20 at 03:00; 

Stop 3/16/20 at 03:01;  Status Cancel


Ketorolac Tromethamine (Toradol 30mg Vial) 30 mg 1X  ONCE IV  Last administered 

on 3/16/20at 02:54;  Start 3/16/20 at 03:00;  Stop 3/16/20 at 03:01;  Status DC


Fentanyl Citrate (Fentanyl 2ml Vial) 25 mcg 1X  ONCE IVP  Last administered on 

3/16/20at 03:23;  Start 3/16/20 at 03:30;  Stop 3/16/20 at 03:31;  Status DC


Fentanyl Citrate (Fentanyl 2ml Vial) 100 mcg STK-MED ONCE .ROUTE ;  Start 3/16

/20 at 03:18;  Stop 3/16/20 at 03:18;  Status DC


Iohexol (Omnipaque 350 Mg/ml) 90 ml 1X  ONCE IV  Last administered on 3/16/20at 

03:25;  Start 3/16/20 at 03:30;  Stop 3/16/20 at 03:31;  Status DC


Info (CONTRAST GIVEN -- Rx MONITORING) 1 each PRN DAILY  PRN MC SEE COMMENTS;  

Start 3/16/20 at 03:30;  Stop 3/18/20 at 03:29;  Status DC


Hydromorphone HCl (Dilaudid) 0.5 mg 1X  ONCE IV  Last administered on 3/16/20at 

03:55;  Start 3/16/20 at 04:30;  Stop 3/16/20 at 04:32;  Status DC


Ondansetron HCl (Zofran) 4 mg PRN Q8HRS  PRN IV NAUSEA/VOMITING 1ST CHOICE;  

Start 3/16/20 at 05:00;  Stop 3/16/20 at 09:27;  Status DC


Morphine Sulfate (Morphine Sulfate) 2 mg PRN Q2HR  PRN IV SEVERE PAIN 7-10 Last 

administered on 3/17/20at 12:26;  Start 3/16/20 at 05:00;  Stop 3/17/20 at 

14:15;  Status DC


Sodium Chloride 1,000 ml @  125 mls/hr Q8H IV  Last administered on 3/16/20at 

20:56;  Start 3/16/20 at 05:00;  Stop 3/17/20 at 04:59;  Status DC


Hydromorphone HCl (Dilaudid) 0.5 mg PRN Q3HRS  PRN IV SEVERE PAIN 7-10 Last 

administered on 3/17/20at 10:06;  Start 3/16/20 at 05:00;  Stop 3/17/20 at 

12:01;  Status DC


Piperacillin Sod/ Tazobactam Sod 4.5 gm/Sodium Chloride 100 ml @  200 mls/hr 1X 

ONCE IV  Last administered on 3/16/20at 05:44;  Start 3/16/20 at 06:00;  Stop 

3/16/20 at 06:29;  Status DC


Ondansetron HCl (Zofran) 4 mg PRN Q4HRS  PRN IV NAUSEA/VOMITING 1ST CHOICE Last 

administered on 6/10/20at 15:34;  Start 3/16/20 at 09:30


Insulin Human Lispro (HumaLOG) 0-9 UNITS Q6HRS SQ  Last administered on 

6/11/20at 06:10;  Start 3/16/20 at 09:30


Dextrose (Dextrose 50%-Water Syringe) 12.5 gm PRN Q15MIN  PRN IV SEE COMMENTS;  

Start 3/16/20 at 09:30


Pantoprazole Sodium (PROTONIX VIAL for IV PUSH) 40 mg DAILYAC IVP  Last 

administered on 6/10/20at 08:17;  Start 3/16/20 at 11:30


Prochlorperazine Edisylate (Compazine) 10 mg PRN Q6HRS  PRN IV NAUSEA/VOMITING, 

2nd CHOICE Last administered on 6/10/20at 14:33;  Start 3/16/20 at 17:45


Atenolol (Tenormin) 100 mg DAILY PO ;  Start 3/17/20 at 09:00;  Stop 3/16/20 at 

20:08;  Status DC


Metoprolol Tartrate (Lopressor Vial) 2.5 mg Q6HRS IVP  Last administered on 

3/17/20at 05:51;  Start 3/16/20 at 20:15;  Stop 3/17/20 at 10:02;  Status DC


Metoprolol Tartrate (Lopressor Vial) 5 mg Q6HRS IVP  Last administered on 

3/26/20at 00:12;  Start 3/17/20 at 10:15;  Stop 3/28/20 at 08:48;  Status DC


Hydromorphone HCl (Dilaudid) 1 mg PRN Q3HRS  PRN IV SEVERE PAIN 7-10 Last 

administered on 3/23/20at 05:13;  Start 3/17/20 at 12:00;  Stop 3/31/20 at 

00:25;  Status DC


Lidocaine HCl (Buffered Lidocaine 1%) 3 ml STK-MED ONCE .ROUTE ;  Start 3/17/20 

at 12:55;  Stop 3/17/20 at 12:56;  Status DC


Albumin Human 500 ml @  125 mls/hr 1X  ONCE IV  Last administered on 3/17/20at 

14:33;  Start 3/17/20 at 14:30;  Stop 3/17/20 at 18:32;  Status DC


Norepinephrine Bitartrate 8 mg/ Dextrose 258 ml @  17.299 mls/ hr CONT  PRN IV 

PER PROTOCOL Last administered on 4/14/20at 12:48;  Start 3/17/20 at 15:30;  

Stop 4/17/20 at 09:19;  Status DC


Sodium Chloride 1,000 ml @  125 mls/hr Q8H IV  Last administered on 3/17/20at 

21:04;  Start 3/17/20 at 16:00;  Stop 3/18/20 at 02:42;  Status DC


Albumin Human 500 ml @  125 mls/hr PRN BID  PRN IV After every 2L NSS & BP < 

90mm Last administered on 6/6/20at 11:40;  Start 3/17/20 at 16:00


Iohexol (Omnipaque 300 Mg/ml) 60 ml 1X  ONCE IV  Last administered on 3/17/20at 

17:20;  Start 3/17/20 at 17:00;  Stop 3/17/20 at 17:01;  Status DC


Info (CONTRAST GIVEN -- Rx MONITORING) 1 each PRN DAILY  PRN MC SEE COMMENTS;  

Start 3/17/20 at 17:00;  Stop 3/19/20 at 16:59;  Status DC


Meropenem 1 gm/ Sodium Chloride 100 ml @  200 mls/hr Q8HRS IV  Last administered

on 3/18/20at 05:45;  Start 3/17/20 at 20:00;  Stop 3/18/20 at 08:48;  Status DC


Furosemide (Lasix) 40 mg 1X  ONCE IVP  Last administered on 3/17/20at 22:12;  

Start 3/17/20 at 22:30;  Stop 3/17/20 at 22:31;  Status DC


Calcium Chloride 1000 mg/Sodium Chloride 110 ml @  220 mls/hr 1X  ONCE IV  Last 

administered on 3/17/20at 22:11;  Start 3/17/20 at 22:30;  Stop 3/17/20 at 

22:59;  Status DC


Albuterol Sulfate (Ventolin Neb Soln) 2.5 mg 1X  ONCE NEB  Last administered on 

3/18/20at 00:56;  Start 3/17/20 at 22:30;  Stop 3/17/20 at 22:31;  Status DC


Insulin Human Regular (HumuLIN R VIAL) 5 unit 1X  ONCE IV  Last administered on 

3/17/20at 22:14;  Start 3/17/20 at 22:30;  Stop 3/17/20 at 22:31;  Status DC


Magnesium Sulfate 50 ml @ 25 mls/hr 1X  ONCE IV  Last administered on 3/18/20at 

02:57;  Start 3/18/20 at 03:00;  Stop 3/18/20 at 04:59;  Status DC


Calcium Gluconate 1000 mg/Sodium Chloride 110 ml @  220 mls/hr 1X  ONCE IV  Last

administered on 3/18/20at 02:46;  Start 3/18/20 at 03:00;  Stop 3/18/20 at 

03:29;  Status DC


Sodium Chloride 1,000 ml @  200 mls/hr Q5H IV  Last administered on 3/18/20at 

02:46;  Start 3/18/20 at 03:00;  Stop 3/18/20 at 10:21;  Status DC


Calcium Gluconate 1000 mg/Sodium Chloride 110 ml @  220 mls/hr 1X  ONCE IV  Last

administered on 3/18/20at 03:21;  Start 3/18/20 at 03:30;  Stop 3/18/20 at 

03:59;  Status DC


Sodium Bicarbonate 50 meq/Sodium Chloride 1,050 ml @  75 mls/hr Q14H IV  Last 

administered on 3/22/20at 21:10;  Start 3/18/20 at 07:30;  Stop 3/23/20 at 

10:28;  Status DC


Calcium Gluconate 2000 mg/Sodium Chloride 120 ml @  220 mls/hr 1X  ONCE IV  Last

administered on 3/18/20at 09:05;  Start 3/18/20 at 07:30;  Stop 3/18/20 at 

08:02;  Status DC


Lidocaine HCl (Xylocaine-Mpf 1% 2ml Vial) 2 ml STK-MED ONCE .ROUTE ;  Start 

3/18/20 at 08:47;  Stop 3/18/20 at 08:47;  Status DC


Meropenem 500 mg/ Sodium Chloride 50 ml @  100 mls/hr Q12HR IV  Last admini

stered on 3/23/20at 21:01;  Start 3/18/20 at 18:00;  Stop 3/24/20 at 07:58;  

Status DC


Lidocaine HCl (Buffered Lidocaine 1%) 3 ml STK-MED ONCE .ROUTE ;  Start 3/18/20 

at 09:46;  Stop 3/18/20 at 09:46;  Status DC


Lidocaine HCl (Buffered Lidocaine 1%) 6 ml 1X  ONCE INJ  Last administered on 

3/18/20at 10:26;  Start 3/18/20 at 10:15;  Stop 3/18/20 at 10:16;  Status DC


Info (Tpn Per Pharmacy) 1 each PRN DAILY  PRN MC SEE COMMENTS Last administered 

on 6/10/20at 10:39;  Start 3/18/20 at 12:00


Sodium Chloride 1,000 ml @  1,000 mls/hr Q1H PRN IV hypotension;  Start 3/18/20 

at 12:07;  Stop 3/18/20 at 18:06;  Status DC


Diphenhydramine HCl (Benadryl) 25 mg 1X PRN  PRN IV ITCHING;  Start 3/18/20 at 

12:15;  Stop 3/19/20 at 12:14;  Status DC


Diphenhydramine HCl (Benadryl) 25 mg 1X PRN  PRN IV ITCHING;  Start 3/18/20 at 

12:15;  Stop 3/19/20 at 12:14;  Status DC


Sodium Chloride 1,000 ml @  400 mls/hr Q2H30M PRN IV PATENCY;  Start 3/18/20 at 

12:07;  Stop 3/19/20 at 00:06;  Status DC


Info (PHARMACY MONITORING -- do not chart) 1 each PRN DAILY  PRN MC SEE 

COMMENTS;  Start 3/18/20 at 12:15;  Stop 3/20/20 at 08:13;  Status DC


Sodium Chloride 90 meq/Calcium Gluconate 10 meq/ Multivitamins 10 ml/Chromium/ 

Copper/Manganese/ Seleni/Zn 1 ml/ Total Parenteral Nutrition/Amino Acids/Dextro

se/ Fat Emulsion Intravenous 55.005 ml  @ 2.292 mls/hr TPN  CONT IV ;  Start 

3/18/20 at 22:00;  Stop 3/18/20 at 12:33;  Status DC


Info (Tpn Per Pharmacy) 1 each PRN DAILY  PRN MC SEE COMMENTS;  Start 3/18/20 at

12:30;  Status UNV


Sodium Chloride 90 meq/Calcium Gluconate 10 meq/ Multivitamins 10 ml/Chromium/ 

Copper/Manganese/ Seleni/Zn 0.5 ml/ Total Parenteral Nutrition/Amino Acids/

Dextrose/ Fat Emulsion Intravenous 1,512 ml @  63 mls/hr TPN  CONT IV  Last 

administered on 3/18/20at 22:06;  Start 3/18/20 at 22:00;  Stop 3/19/20 at 

21:59;  Status DC


Calcium Carbonate/ Glycine (Tums) 500 mg PRN AFTMEALHC  PRN PO INDIGESTION;  

Start 3/18/20 at 17:45;  Stop 5/13/20 at 10:25;  Status DC


Calcium Gluconate (Calcium Gluconate) 2,000 mg 1X  ONCE IVP  Last administered 

on 3/19/20at 02:19;  Start 3/19/20 at 02:15;  Stop 3/19/20 at 02:16;  Status DC


Calcium Chloride 3000 mg/Sodium Chloride 1,030 ml @  50 mls/hr [id number] IV  Last 

administered on 3/21/20at 02:17;  Start 3/19/20 at 08:00;  Stop 3/21/20 at 

15:23;  Status DC


Lorazepam (Ativan Inj) 1 mg PRN Q4HRS  PRN IVP ANXIETY / AGITATION, 2nd choic 

Last administered on 4/17/20at 03:51;  Start 3/19/20 at 09:00;  Stop 4/17/20 at 

09:19;  Status DC


Sodium Chloride 1,000 ml @  1,000 mls/hr Q1H PRN IV hypotension;  Start 3/19/20 

at 08:56;  Stop 3/19/20 at 14:55;  Status DC


Albumin Human 200 ml @  200 mls/hr 1X PRN  PRN IV Hypotension;  Start 3/19/20 at

09:00;  Stop 3/19/20 at 14:59;  Status DC


Diphenhydramine HCl (Benadryl) 25 mg 1X PRN  PRN IV ITCHING;  Start 3/19/20 at 

09:00;  Stop 3/20/20 at 08:59;  Status DC


Diphenhydramine HCl (Benadryl) 25 mg 1X PRN  PRN IV ITCHING;  Start 3/19/20 at 

09:00;  Stop 3/20/20 at 08:59;  Status DC


Sodium Chloride 1,000 ml @  400 mls/hr Q2H30M PRN IV PATENCY;  Start 3/19/20 at 

08:56;  Stop 3/19/20 at 20:55;  Status DC


Info (PHARMACY MONITORING -- do not chart) 1 each PRN DAILY  PRN MC SEE 

COMMENTS;  Start 3/19/20 at 09:00;  Status UNV


Info (PHARMACY MONITORING -- do not chart) 1 each PRN DAILY  PRN MC SEE 

COMMENTS;  Start 3/19/20 at 09:00;  Stop 3/20/20 at 08:13;  Status DC


Digoxin (Lanoxin) 500 mcg 1X  ONCE IV  Last administered on 3/19/20at 10:04;  

Start 3/19/20 at 10:00;  Stop 3/19/20 at 10:01;  Status DC


Digoxin (Lanoxin) 125 mcg 1X  ONCE IV  Last administered on 3/19/20at 17:10;  

Start 3/19/20 at 18:00;  Stop 3/19/20 at 18:01;  Status DC


Magnesium Sulfate 100 ml @  25 mls/hr 1X  ONCE IV  Last administered on 

3/19/20at 12:48;  Start 3/19/20 at 13:00;  Stop 3/19/20 at 16:59;  Status DC


Sodium Chloride 90 meq/Magnesium Sulfate 10 meq/ Calcium Gluconate 20 meq/ 

Multivitamins 10 ml/Chromium/ Copper/Manganese/ Seleni/Zn 0.5 ml/ Total 

Parenteral Nutrition/Amino Acids/Dextrose/ Fat Emulsion Intravenous 1,512 ml @  

63 mls/hr TPN  CONT IV  Last administered on 3/19/20at 22:25;  Start 3/19/20 at 

22:00;  Stop 3/20/20 at 21:59;  Status DC


Sodium Chloride 1,000 ml @  1,000 mls/hr Q1H PRN IV hypotension;  Start 3/20/20 

at 08:05;  Stop 3/20/20 at 14:04;  Status DC


Albumin Human 200 ml @  200 mls/hr 1X  ONCE IV  Last administered on 3/20/20at 

08:57;  Start 3/20/20 at 08:15;  Stop 3/20/20 at 09:14;  Status DC


Diphenhydramine HCl (Benadryl) 25 mg 1X PRN  PRN IV ITCHING;  Start 3/20/20 at 

08:15;  Stop 3/21/20 at 08:14;  Status DC


Diphenhydramine HCl (Benadryl) 25 mg 1X PRN  PRN IV ITCHING;  Start 3/20/20 at 

08:15;  Stop 3/21/20 at 08:14;  Status DC


Sodium Chloride 1,000 ml @  400 mls/hr Q2H30M PRN IV PATENCY;  Start 3/20/20 at 

08:05;  Stop 3/20/20 at 20:04;  Status DC


Info (PHARMACY MONITORING -- do not chart) 1 each PRN DAILY  PRN MC SEE 

COMMENTS;  Start 3/20/20 at 08:15;  Stop 3/24/20 at 07:57;  Status DC


Sodium Chloride 90 meq/Potassium Chloride 15 meq/ Potassium Phosphate 10 mmol/ 

Magnesium Sulfate 10 meq/Calcium Gluconate 20 meq/ Multivitamins 10 ml/Chromium/

Copper/Manganese/ Seleni/Zn 0.5 ml/ Total Parenteral Nutrition/Amino 

Acids/Dextrose/ Fat Emulsion Intravenous 1,512 ml @  63 mls/hr TPN  CONT IV  

Last administered on 3/20/20at 21:01;  Start 3/20/20 at 22:00;  Stop 3/21/20 at 

21:59;  Status DC


Potassium Chloride/Water 100 ml @  100 mls/hr 1X  ONCE IV  Last administered on 

3/20/20at 14:09;  Start 3/20/20 at 14:00;  Stop 3/20/20 at 14:59;  Status DC


Benzocaine (Hurricaine One) 1 spray 1X  ONCE MM  Last administered on 3/20/20at 

16:38;  Start 3/20/20 at 14:30;  Stop 3/20/20 at 14:31;  Status DC


Lidocaine HCl (Glydo (Lidocaine) Jelly) 1 thomas 1X  ONCE MM  Last administered on 

3/20/20at 16:38;  Start 3/20/20 at 14:30;  Stop 3/20/20 at 14:31;  Status DC


Linezolid/Dextrose 300 ml @  300 mls/hr Q12HR IV  Last administered on 3/26/20at

21:04;  Start 3/20/20 at 20:00;  Stop 3/27/20 at 07:50;  Status DC


Acetaminophen (Tylenol) 650 mg PRN Q6HRS  PRN PO MILD PAIN / TEMP;  Start 

3/21/20 at 03:30;  Stop 3/21/20 at 03:36;  Status DC


Acetaminophen (Tylenol) 650 mg PRN Q6HRS  PRN PEG MILD PAIN / TEMP Last 

administered on 4/16/20at 19:56;  Start 3/21/20 at 03:36;  Stop 5/13/20 at 

10:25;  Status DC


Sodium Chloride 1,000 ml @  1,000 mls/hr Q1H PRN IV hypotension;  Start 3/21/20 

at 07:50;  Stop 3/21/20 at 13:49;  Status DC


Albumin Human 200 ml @  200 mls/hr 1X PRN  PRN IV Hypotension;  Start 3/21/20 at

08:00;  Stop 3/21/20 at 13:59;  Status DC


Sodium Chloride (Normal Saline Flush) 10 ml 1X PRN  PRN IV AP catheter pack;  

Start 3/21/20 at 08:00;  Stop 3/22/20 at 07:59;  Status DC


Sodium Chloride (Normal Saline Flush) 10 ml 1X PRN  PRN IV  catheter pack;  

Start 3/21/20 at 08:00;  Stop 3/22/20 at 07:59;  Status DC


Sodium Chloride 1,000 ml @  400 mls/hr Q2H30M PRN IV PATENCY;  Start 3/21/20 at 

07:50;  Stop 3/21/20 at 19:49;  Status DC


Info (PHARMACY MONITORING -- do not chart) 1 each PRN DAILY  PRN MC SEE 

COMMENTS;  Start 3/21/20 at 08:00;  Status UNV


Info (PHARMACY MONITORING -- do not chart) 1 each PRN DAILY  PRN MC SEE C

OMMENTS;  Start 3/21/20 at 08:00;  Stop 3/23/20 at 08:25;  Status DC


Sodium Chloride 90 meq/Potassium Chloride 15 meq/ Potassium Phosphate 10 mmol/ 

Magnesium Sulfate 10 meq/Calcium Gluconate 20 meq/ Multivitamins 10 ml/Chromium/

Copper/Manganese/ Seleni/Zn 0.5 ml/ Total Parenteral Nutrition/Amino 

Acids/Dextrose/ Fat Emulsion Intravenous 1,512 ml @  63 mls/hr TPN  CONT IV  

Last administered on 3/21/20at 20:57;  Start 3/21/20 at 22:00;  Stop 3/22/20 at 

21:59;  Status DC


Sodium Chloride 90 meq/Potassium Chloride 15 meq/ Potassium Phosphate 15 mmol/ 

Magnesium Sulfate 10 meq/Calcium Gluconate 20 meq/ Multivitamins 10 ml/Chromium/

Copper/Manganese/ Seleni/Zn 0.5 ml/ Total Parenteral Nutrition/Amino 

Acids/Dextrose/ Fat Emulsion Intravenous 1,512 ml @  63 mls/hr TPN  CONT IV ;  

Start 3/22/20 at 22:00;  Stop 3/22/20 at 14:16;  Status DC


Sodium Chloride 90 meq/Potassium Chloride 15 meq/ Potassium Phosphate 15 mmol/ 

Magnesium Sulfate 10 meq/Calcium Gluconate 20 meq/ Multivitamins 10 ml/Chromium/

Copper/Manganese/ Seleni/Zn 0.5 ml/ Total Parenteral Nutrition/Amino 

Acids/Dextrose/ Fat Emulsion Intravenous 1,200 ml @  50 mls/hr TPN  CONT IV ;  

Start 3/22/20 at 22:00;  Stop 3/22/20 at 14:17;  Status DC


Sodium Chloride 90 meq/Potassium Chloride 15 meq/ Potassium Phosphate 10 mmol/ 

Magnesium Sulfate 10 meq/Calcium Gluconate 20 meq/ Multivitamins 10 ml/Chromium/

Copper/Manganese/ Seleni/Zn 0.5 ml/ Total Parenteral Nutrition/Amino 

Acids/Dextrose/ Fat Emulsion Intravenous 1,200 ml @  50 mls/hr TPN  CONT IV  

Last administered on 3/22/20at 23:29;  Start 3/22/20 at 22:00;  Stop 3/23/20 at 

21:59;  Status DC


Sodium Chloride 1,000 ml @  1,000 mls/hr Q1H PRN IV hypotension;  Start 3/23/20 

at 07:28;  Stop 3/23/20 at 13:27;  Status DC


Albumin Human 200 ml @  200 mls/hr 1X  ONCE IV  Last administered on 3/23/20at 

08:51;  Start 3/23/20 at 07:30;  Stop 3/23/20 at 08:29;  Status DC


Diphenhydramine HCl (Benadryl) 25 mg 1X PRN  PRN IV ITCHING;  Start 3/23/20 at 

07:30;  Stop 3/24/20 at 07:29;  Status DC


Diphenhydramine HCl (Benadryl) 25 mg 1X PRN  PRN IV ITCHING;  Start 3/23/20 at 

07:30;  Stop 3/24/20 at 07:29;  Status DC


Sodium Chloride 1,000 ml @  400 mls/hr Q2H30M PRN IV PATENCY;  Start 3/23/20 at 

07:28;  Stop 3/23/20 at 19:27;  Status DC


Info (PHARMACY MONITORING -- do not chart) 1 each PRN DAILY  PRN MC SEE 

COMMENTS;  Start 3/23/20 at 07:30;  Stop 4/3/20 at 13:01;  Status DC


Metronidazole 100 ml @  100 mls/hr Q6HRS IV  Last administered on 4/8/20at 

06:26;  Start 3/23/20 at 08:30;  Stop 4/8/20 at 09:58;  Status DC


Micafungin Sodium 100 mg/Dextrose 100 ml @  100 mls/hr Q24H IV  Last 

administered on 4/30/20at 08:18;  Start 3/23/20 at 09:00;  Stop 4/30/20 at 

20:58;  Status DC


Propofol 0 ml @ As Directed STK-MED ONCE IV ;  Start 3/23/20 at 07:53;  Stop 

3/23/20 at 07:53;  Status DC


Etomidate (Amidate) 20 mg STK-MED ONCE IV ;  Start 3/23/20 at 07:53;  Stop 

3/23/20 at 07:54;  Status DC


Midazolam HCl (Versed) 5 mg STK-MED ONCE .ROUTE ;  Start 3/23/20 at 07:57;  Stop

3/23/20 at 07:57;  Status DC


Fentanyl Citrate 30 ml @ 0 mls/hr CONT  PRN IV SEE PROTOCOL Last administered on

4/17/20at 06:12;  Start 3/23/20 at 08:15;  Stop 4/17/20 at 09:19;  Status DC


Artificial Tears (Artificial Tears) 1 drop PRN Q1HR  PRN OU DRY EYE, 1st choice;

 Start 3/23/20 at 08:15;  Stop 4/29/20 at 05:31;  Status DC


Midazolam HCl 50 mg/Sodium Chloride 50 ml @ 0 mls/hr CONT  PRN IV SEE PROTOCOL 

Last administered on 3/26/20at 22:39;  Start 3/23/20 at 08:15;  Stop 3/28/20 at 

15:59;  Status DC


Etomidate (Amidate) 8 mg 1X  ONCE IV  Last administered on 3/23/20at 08:33;  

Start 3/23/20 at 08:30;  Stop 3/23/20 at 08:31;  Status DC


Succinylcholine Chloride (Anectine) 120 mg 1X  ONCE IV  Last administered on 

3/23/20at 08:34;  Start 3/23/20 at 08:30;  Stop 3/23/20 at 08:31;  Status DC


Midazolam HCl (Versed) 5 mg 1X  ONCE IV ;  Start 3/23/20 at 08:30;  Stop 3/23/20

at 08:31;  Status DC


Potassium Chloride 15 meq/ Bicarbonate Dialysis Soln w/ out KCl 5,007.5 ml  @ 

1,000 mls/ hr Q5H1M IV  Last administered on 3/24/20at 11:11;  Start 3/23/20 at 

12:00;  Stop 3/24/20 at 11:15;  Status DC


Potassium Chloride 15 meq/ Bicarbonate Dialysis Soln w/ out KCl 5,007.5 ml  @ 

1,000 mls/ hr Q5H1M IV  Last administered on 3/24/20at 11:12;  Start 3/23/20 at 

12:00;  Stop 3/24/20 at 11:17;  Status DC


Potassium Chloride 15 meq/ Bicarbonate Dialysis Soln w/ out KCl 5,007.5 ml  @ 

1,000 mls/ hr Q5H1M IV  Last administered on 3/24/20at 11:11;  Start 3/23/20 at 

12:00;  Stop 3/24/20 at 11:19;  Status DC


Sodium Chloride 90 meq/Potassium Chloride 15 meq/ Potassium Phosphate 10 mmol/ 

Magnesium Sulfate 10 meq/Calcium Gluconate 20 meq/ Multivitamins 10 ml/Chromium/

Copper/Manganese/ Seleni/Zn 0.5 ml/ Total Parenteral Nutrition/Amino 

Acids/Dextrose/ Fat Emulsion Intravenous 1,400 ml @  58.333 mls/ hr TPN  CONT IV

 Last administered on 3/23/20at 21:42;  Start 3/23/20 at 22:00;  Stop 3/24/20 at

21:59;  Status DC


Heparin Sodium (Porcine) (Heparin Sodium) 5,000 unit Q8HRS SQ  Last administered

on 3/28/20at 05:55;  Start 3/23/20 at 15:00;  Stop 3/28/20 at 13:28;  Status DC


Meropenem 500 mg/ Sodium Chloride 50 ml @  100 mls/hr Q6HRS IV  Last adm

inistered on 3/25/20at 06:00;  Start 3/24/20 at 09:00;  Stop 3/25/20 at 07:29;  

Status DC


Potassium Phosphate 20 mmol/ Sodium Chloride 106.6667 ml @  51.667 m... 1X  ONCE

IV  Last administered on 3/24/20at 11:22;  Start 3/24/20 at 10:15;  Stop 3/24/20

at 12:18;  Status DC


Acetaminophen (Tylenol Supp) 650 mg PRN Q6HRS  PRN SC MILD PAIN / TEMP > 100.3'F

Last administered on 6/10/20at 22:16;  Start 3/24/20 at 10:30


Potassium Chloride/Water 100 ml @  100 mls/hr Q1H IV  Last administered on 

3/24/20at 12:12;  Start 3/24/20 at 11:00;  Stop 3/24/20 at 12:59;  Status DC


Potassium Chloride 20 meq/ Bicarbonate Dialysis Soln w/ out KCl 5,010 ml @  

1,000 mls/hr Q5H1M IV  Last administered on 3/25/20at 08:48;  Start 3/24/20 at 

12:00;  Stop 3/25/20 at 13:03;  Status DC


Potassium Chloride 20 meq/ Bicarbonate Dialysis Soln w/ out KCl 5,010 ml @  

1,000 mls/hr Q5H1M IV  Last administered on 3/29/20at 14:52;  Start 3/24/20 at 

11:30;  Stop 3/29/20 at 19:59;  Status DC


Potassium Chloride 20 meq/ Bicarbonate Dialysis Soln w/ out KCl 5,010 ml @  

1,000 mls/hr Q5H1M IV  Last administered on 3/29/20at 14:53;  Start 3/24/20 at 

11:30;  Stop 3/29/20 at 19:59;  Status DC


Sodium Chloride 90 meq/Potassium Chloride 15 meq/ Potassium Phosphate 15 mmol/ 

Magnesium Sulfate 10 meq/Calcium Gluconate 15 meq/ Multivitamins 10 ml/Chromium/

Copper/Manganese/ Seleni/Zn 0.5 ml/ Total Parenteral Nutrition/Amino 

Acids/Dextrose/ Fat Emulsion Intravenous 1,400 ml @  58.333 mls/ hr TPN  CONT IV

 Last administered on 3/24/20at 22:17;  Start 3/24/20 at 22:00;  Stop 3/25/20 at

21:59;  Status DC


Cefepime HCl (Maxipime) 2 gm Q12HR IVP  Last administered on 4/7/20at 20:56;  

Start 3/25/20 at 09:00;  Stop 4/8/20 at 09:58;  Status DC


Daptomycin 500 mg/ Sodium Chloride 50 ml @  100 mls/hr Q48H IV  Last 

administered on 4/10/20at 09:57;  Start 3/25/20 at 08:30;  Stop 4/10/20 at 

10:07;  Status DC


Lidocaine HCl (Buffered Lidocaine 1%) 3 ml 1X  ONCE INJ  Last administered on 

3/25/20at 10:27;  Start 3/25/20 at 10:30;  Stop 3/25/20 at 10:31;  Status DC


Potassium Phosphate 20 mmol/ Sodium Chloride 106.6667 ml @  51.667 m... 1X  ONCE

IV  Last administered on 3/25/20at 12:51;  Start 3/25/20 at 13:00;  Stop 3/25/20

at 15:03;  Status DC


Sodium Chloride 90 meq/Potassium Chloride 15 meq/ Potassium Phosphate 18 mmol/ 

Magnesium Sulfate 8 meq/Calcium Gluconate 15 meq/ Multivitamins 10 ml/Chromium/ 

Copper/Manganese/ Seleni/Zn 0.5 ml/ Total Parenteral Nutrition/Amino 

Acids/Dextrose/ Fat Emulsion Intravenous 1,400 ml @  58.333 mls/ hr TPN  CONT IV

 Last administered on 3/25/20at 22:16;  Start 3/25/20 at 22:00;  Stop 3/26/20 at

21:59;  Status DC


Potassium Chloride 20 meq/ Bicarbonate Dialysis Soln w/ out KCl 5,010 ml @  

1,000 mls/hr Q5H1M IV  Last administered on 3/29/20at 14:54;  Start 3/25/20 at 

16:00;  Stop 3/29/20 at 19:59;  Status DC


Multi-Ingred Cream/Lotion/Oil/ Oint (Artificial Tears Eye Ointment) 1 thomas PRN 

Q1HR  PRN OU DRY EYE, 2nd choice Last administered on 4/13/20at 08:19;  Start 

3/25/20 at 17:30;  Stop 6/3/20 at 14:39;  Status DC


Sodium Chloride 90 meq/Potassium Chloride 15 meq/ Potassium Phosphate 18 mmol/ 

Magnesium Sulfate 8 meq/Calcium Gluconate 15 meq/ Multivitamins 10 ml/Chromium/ 

Copper/Manganese/ Seleni/Zn 0.5 ml/ Total Parenteral Nutrition/Amino 

Acids/Dextrose/ Fat Emulsion Intravenous 1,400 ml @  58.333 mls/ hr TPN  CONT IV

 Last administered on 3/26/20at 22:00;  Start 3/26/20 at 22:00;  Stop 3/27/20 at

21:59;  Status DC


Albumin Human 500 ml @  125 mls/hr 1X  ONCE IV ;  Start 3/26/20 at 14:15;  Stop 

3/26/20 at 18:14;  Status DC


Sodium Chloride 90 meq/Potassium Chloride 15 meq/ Potassium Phosphate 18 mmol/ 

Magnesium Sulfate 8 meq/Calcium Gluconate 15 meq/ Multivitamins 10 ml/Chromium/ 

Copper/Manganese/ Seleni/Zn 0.5 ml/ Insulin Human Regular 10 unit/ Total 

Parenteral Nutrition/Amino Acids/Dextrose/ Fat Emulsion Intravenous 1,400 ml @  

58.333 mls/ hr TPN  CONT IV  Last administered on 3/27/20at 21:43;  Start 

3/27/20 at 22:00;  Stop 3/28/20 at 21:59;  Status DC


Lidocaine HCl (Buffered Lidocaine 1%) 3 ml STK-MED ONCE .ROUTE ;  Start 3/25/20 

at 10:00;  Stop 3/27/20 at 13:57;  Status DC


Midazolam HCl 100 mg/Sodium Chloride 100 ml @ 7 mls/hr CONT  PRN IV SEE PROTOCOL

Last administered on 4/8/20at 15:35;  Start 3/28/20 at 16:00;  Stop 6/3/20 at 

14:38;  Status DC


Sodium Chloride 90 meq/Potassium Chloride 15 meq/ Potassium Phosphate 18 mmol/ 

Magnesium Sulfate 8 meq/Calcium Gluconate 15 meq/ Multivitamins 10 ml/Chromium/ 

Copper/Manganese/ Seleni/Zn 0.5 ml/ Insulin Human Regular 15 unit/ Total 

Parenteral Nutrition/Amino Acids/Dextrose/ Fat Emulsion Intravenous 1,400 ml @  

58.333 mls/ hr TPN  CONT IV  Last administered on 3/28/20at 20:34;  Start 

3/28/20 at 22:00;  Stop 3/29/20 at 21:59;  Status DC


Info ([facility] Electrolyte Protocol) 1 ea CONT PRN  PRN MC PER PROTOCOL;  Start 

3/29/20 at 13:15


Sodium Chloride 90 meq/Potassium Chloride 15 meq/ Potassium Phosphate 18 mmol/ 

Magnesium Sulfate 8 meq/Calcium Gluconate 15 meq/ Multivitamins 10 ml/Chromium/ 

Copper/Manganese/ Seleni/Zn 0.5 ml/ Insulin Human Regular 15 unit/ Total 

Parenteral Nutrition/Amino Acids/Dextrose/ Fat Emulsion Intravenous 1,400 ml @  

58.333 mls/ hr TPN  CONT IV  Last administered on 3/29/20at 22:05;  Start 

3/29/20 at 22:00;  Stop 3/30/20 at 21:59;  Status DC


Potassium Chloride 15 meq/ Bicarbonate Dialysis Soln w/ out KCl 5,007.5 ml  @ 

1,000 mls/ hr Q5H1M IV  Last administered on 4/1/20at 18:14;  Start 3/29/20 at 

20:00;  Stop 4/2/20 at 13:08;  Status DC


Potassium Chloride 15 meq/ Bicarbonate Dialysis Soln w/ out KCl 5,007.5 ml  @ 

1,000 mls/ hr Q5H1M IV  Last administered on 4/1/20at 18:14;  Start 3/29/20 at 

20:00;  Stop 4/2/20 at 13:08;  Status DC


Potassium Chloride 15 meq/ Bicarbonate Dialysis Soln w/ out KCl 5,007.5 ml  @ 

1,000 mls/ hr Q5H1M IV  Last administered on 4/1/20at 18:14;  Start 3/29/20 at 

20:00;  Stop 4/2/20 at 13:08;  Status DC


Iohexol (Omnipaque 240 Mg/ml) 30 ml 1X  ONCE PO  Last administered on 3/30/20at 

11:30;  Start 3/30/20 at 11:30;  Stop 3/30/20 at 11:33;  Status DC


Info (CONTRAST GIVEN -- Rx MONITORING) 1 each PRN DAILY  PRN MC SEE COMMENTS;  

Start 3/30/20 at 11:45;  Stop 4/1/20 at 11:44;  Status DC


Sodium Chloride 90 meq/Potassium Chloride 15 meq/ Potassium Phosphate 18 mmol/ 

Magnesium Sulfate 8 meq/Calcium Gluconate 15 meq/ Multivitamins 10 ml/Chromium/ 

Copper/Manganese/ Seleni/Zn 0.5 ml/ Insulin Human Regular 15 unit/ Total 

Parenteral Nutrition/Amino Acids/Dextrose/ Fat Emulsion Intravenous 1,400 ml @  

58.333 mls/ hr TPN  CONT IV  Last administered on 3/30/20at 21:47;  Start 

3/30/20 at 22:00;  Stop 3/31/20 at 21:59;  Status DC


Sodium Chloride 90 meq/Potassium Chloride 15 meq/ Potassium Phosphate 18 mmol/ 

Magnesium Sulfate 8 meq/Calcium Gluconate 15 meq/ Multivitamins 10 ml/Chromium/ 

Copper/Manganese/ Seleni/Zn 0.5 ml/ Insulin Human Regular 20 unit/ Total 

Parenteral Nutrition/Amino Acids/Dextrose/ Fat Emulsion Intravenous 1,400 ml @  

58.333 mls/ hr TPN  CONT IV  Last administered on 3/31/20at 21:36;  Start 

3/31/20 at 22:00;  Stop 4/1/20 at 21:59;  Status DC


Alteplase, Recombinant (Cathflo For Central Catheter Clearance) 1 mg 1X  ONCE 

INT CAT  Last administered on 3/31/20at 20:03;  Start 3/31/20 at 19:30;  Stop 

3/31/20 at 19:46;  Status DC


Alteplase, Recombinant (Cathflo For Central Catheter Clearance) 1 mg 1X  ONCE 

INT CAT  Last administered on 3/31/20at 22:05;  Start 3/31/20 at 22:00;  Stop 

3/31/20 at 22:01;  Status DC


Sodium Chloride 90 meq/Potassium Chloride 15 meq/ Potassium Phosphate 18 mmol/ 

Magnesium Sulfate 8 meq/Calcium Gluconate 15 meq/ Multivitamins 10 ml/Chromium/ 

Copper/Manganese/ Seleni/Zn 0.5 ml/ Insulin Human Regular 20 unit/ Total 

Parenteral Nutrition/Amino Acids/Dextrose/ Fat Emulsion Intravenous 1,400 ml @  

58.333 mls/ hr TPN  CONT IV  Last administered on 4/1/20at 21:30;  Start 4/1/20 

at 22:00;  Stop 4/2/20 at 21:59;  Status DC


Dexmedetomidine HCl 400 mcg/ Sodium Chloride 100 ml @ 0 mls/hr CONT  PRN IV 

ANXIETY / AGITATION Last administered on 5/30/20at 12:57;  Start 4/2/20 at 

08:15;  Stop 5/30/20 at 18:31;  Status DC


Sodium Chloride 500 ml @  500 mls/hr 1X PRN  PRN IV ELEVATED BP, SEE COMMENTS;  

Start 4/2/20 at 08:15


Atropine Sulfate (ATROPINE 0.5mg SYRINGE) 0.5 mg PRN Q5MIN  PRN IV SEE COMMENTS;

 Start 4/2/20 at 08:15


Furosemide (Lasix) 20 mg 1X  ONCE IVP  Last administered on 4/2/20at 08:19;  

Start 4/2/20 at 08:15;  Stop 4/2/20 at 08:16;  Status DC


Lidocaine HCl (Buffered Lidocaine 1%) 3 ml STK-MED ONCE .ROUTE ;  Start 4/2/20 

at 08:39;  Stop 4/2/20 at 08:39;  Status DC


Lidocaine HCl (Buffered Lidocaine 1%) 6 ml 1X  ONCE INJ  Last administered on 

4/2/20at 09:05;  Start 4/2/20 at 09:00;  Stop 4/2/20 at 09:06;  Status DC


Sodium Chloride 90 meq/Potassium Chloride 15 meq/ Potassium Phosphate 18 mmol/ 

Magnesium Sulfate 8 meq/Calcium Gluconate 15 meq/ Multivitamins 10 ml/Chromium/ 

Copper/Manganese/ Seleni/Zn 0.5 ml/ Insulin Human Regular 20 unit/ Total 

Parenteral Nutrition/Amino Acids/Dextrose/ Fat Emulsion Intravenous 1,400 ml @  

58.333 mls/ hr TPN  CONT IV  Last administered on 4/2/20at 22:45;  Start 4/2/20 

at 22:00;  Stop 4/3/20 at 21:59;  Status DC


Sodium Chloride 1,000 ml @  1,000 mls/hr Q1H PRN IV hypotension;  Start 4/3/20 

at 07:30;  Stop 4/3/20 at 13:29;  Status DC


Albumin Human 200 ml @  200 mls/hr 1X PRN  PRN IV Hypotension Last administered 

on 4/3/20at 09:36;  Start 4/3/20 at 07:30;  Stop 4/3/20 at 13:29;  Status DC


Sodium Chloride (Normal Saline Flush) 10 ml 1X PRN  PRN IV AP catheter pack;  

Start 4/3/20 at 07:30;  Stop 4/3/20 at 21:29;  Status DC


Sodium Chloride (Normal Saline Flush) 10 ml 1X PRN  PRN IV  catheter pack;  

Start 4/3/20 at 07:30;  Stop 4/4/20 at 07:29;  Status DC


Sodium Chloride 1,000 ml @  400 mls/hr Q2H30M PRN IV PATENCY;  Start 4/3/20 at 

07:30;  Stop 4/3/20 at 19:29;  Status DC


Info (PHARMACY MONITORING -- do not chart) 1 each PRN DAILY  PRN MC SEE 

COMMENTS;  Start 4/3/20 at 07:30;  Stop 4/3/20 at 13:02;  Status DC


Info (PHARMACY MONITORING -- do not chart) 1 each PRN DAILY  PRN MC SEE 

COMMENTS;  Start 4/3/20 at 07:30;  Stop 4/5/20 at 12:45;  Status DC


Sodium Chloride 90 meq/Potassium Chloride 15 meq/ Potassium Phosphate 10 mmol/ 

Magnesium Sulfate 8 meq/Calcium Gluconate 15 meq/ Multivitamins 10 ml/Chromium/ 

Copper/Manganese/ Seleni/Zn 0.5 ml/ Insulin Human Regular 25 unit/ Total 

Parenteral Nutrition/Amino Acids/Dextrose/ Fat Emulsion Intravenous 1,400 ml @  

58.333 mls/ hr TPN  CONT IV  Last administered on 4/3/20at 22:19;  Start 4/3/20 

at 22:00;  Stop 4/4/20 at 21:59;  Status DC


Heparin Sodium (Porcine) (Heparin Sodium) 5,000 unit Q12HR SQ  Last administered

on 4/26/20at 08:59;  Start 4/3/20 at 21:00;  Stop 4/26/20 at 10:05;  Status DC


Ondansetron HCl (Zofran) 4 mg PRN Q6HRS  PRN IV NAUSEA/VOMITING;  Start 4/6/20 

at 07:00;  Stop 4/7/20 at 06:59;  Status DC


Fentanyl Citrate (Fentanyl 2ml Vial) 25 mcg PRN Q5MIN  PRN IV MILD PAIN 1-3;  

Start 4/6/20 at 07:00;  Stop 4/7/20 at 06:59;  Status DC


Fentanyl Citrate (Fentanyl 2ml Vial) 50 mcg PRN Q5MIN  PRN IV MODERATE TO SEVERE

PAIN;  Start 4/6/20 at 07:00;  Stop 4/7/20 at 06:59;  Status DC


Ringer's Solution 1,000 ml @  30 mls/hr Q24H IV ;  Start 4/6/20 at 07:00;  Stop 

4/6/20 at 18:59;  Status DC


Lidocaine HCl (Xylocaine-Mpf 1% 2ml Vial) 2 ml PRN 1X  PRN ID PRIOR TO IV START;

 Start 4/6/20 at 07:00;  Stop 4/7/20 at 06:59;  Status DC


Prochlorperazine Edisylate (Compazine) 5 mg PACU PRN  PRN IV NAUSEA, MRX1;  

Start 4/6/20 at 07:00;  Stop 4/7/20 at 06:59;  Status DC


Sodium Chloride 1,000 ml @  1,000 mls/hr Q1H PRN IV hypotension;  Start 4/4/20 

at 09:10;  Stop 4/4/20 at 15:09;  Status DC


Albumin Human 200 ml @  200 mls/hr 1X PRN  PRN IV Hypotension Last administered 

on 4/4/20at 10:10;  Start 4/4/20 at 09:15;  Stop 4/4/20 at 15:14;  Status DC


Sodium Chloride 1,000 ml @  400 mls/hr Q2H30M PRN IV PATENCY;  Start 4/4/20 at 

09:10;  Stop 4/4/20 at 21:09;  Status DC


Info (PHARMACY MONITORING -- do not chart) 1 each PRN DAILY  PRN MC SEE 

COMMENTS;  Start 4/4/20 at 09:15;  Stop 4/5/20 at 12:45;  Status DC


Info (PHARMACY MONITORING -- do not chart) 1 each PRN DAILY  PRN MC SEE 

COMMENTS;  Start 4/4/20 at 09:15;  Stop 4/5/20 at 12:45;  Status DC


Sodium Chloride 90 meq/Potassium Chloride 15 meq/ Potassium Phosphate 10 mmol/ 

Magnesium Sulfate 8 meq/Calcium Gluconate 15 meq/ Multivitamins 10 ml/Chromium/ 

Copper/Manganese/ Seleni/Zn 0.5 ml/ Insulin Human Regular 25 unit/ Total 

Parenteral Nutrition/Amino Acids/Dextrose/ Fat Emulsion Intravenous 1,400 ml @  

58.333 mls/ hr TPN  CONT IV  Last administered on 4/4/20at 22:10;  Start 4/4/20 

at 22:00;  Stop 4/5/20 at 21:59;  Status DC


Magnesium Sulfate 50 ml @ 25 mls/hr PRN DAILY  PRN IV for Mag < 1.7 on am labs 

Last administered on 4/20/20at 17:27;  Start 4/5/20 at 09:15


Sodium Chloride 90 meq/Potassium Chloride 15 meq/ Potassium Phosphate 10 mmol/ 

Magnesium Sulfate 8 meq/Calcium Gluconate 15 meq/ Multivitamins 10 ml/Chromium/ 

Copper/Manganese/ Seleni/Zn 0.5 ml/ Insulin Human Regular 25 unit/ Total 

Parenteral Nutrition/Amino Acids/Dextrose/ Fat Emulsion Intravenous 1,400 ml @  

58.333 mls/ hr TPN  CONT IV  Last administered on 4/5/20at 21:20;  Start 4/5/20 

at 22:00;  Stop 4/6/20 at 21:59;  Status DC


Sodium Chloride 1,000 ml @  1,000 mls/hr Q1H PRN IV hypotension;  Start 4/5/20 

at 12:23;  Stop 4/5/20 at 18:22;  Status DC


Albumin Human 200 ml @  200 mls/hr 1X  ONCE IV  Last administered on 4/5/20at 

13:34;  Start 4/5/20 at 12:30;  Stop 4/5/20 at 13:29;  Status DC


Diphenhydramine HCl (Benadryl) 25 mg 1X PRN  PRN IV ITCHING;  Start 4/5/20 at 

12:30;  Stop 4/6/20 at 12:29;  Status DC


Diphenhydramine HCl (Benadryl) 25 mg 1X PRN  PRN IV ITCHING;  Start 4/5/20 at 

12:30;  Stop 4/6/20 at 12:29;  Status DC


Info (PHARMACY MONITORING -- do not chart) 1 each PRN DAILY  PRN MC SEE 

COMMENTS;  Start 4/5/20 at 12:30;  Status Cancel


Bupivacaine HCl/ Epinephrine Bitart (Sensorcain-Epi 0.5%-1:899996 Mpf) 30 ml 

STK-MED ONCE .ROUTE  Last administered on 4/6/20at 11:44;  Start 4/6/20 at 

11:00;  Stop 4/6/20 at 11:01;  Status DC


Cellulose (Surgicel Fibrillar 1x2) 1 each STK-MED ONCE .ROUTE ;  Start 4/6/20 at

11:00;  Stop 4/6/20 at 11:01;  Status DC


Sodium Chloride 90 meq/Potassium Chloride 15 meq/ Potassium Phosphate 10 mmol/ 

Magnesium Sulfate 12 meq/Calcium Gluconate 15 meq/ Multivitamins 10 ml/Chromium/

Copper/Manganese/ Seleni/Zn 0.5 ml/ Insulin Human Regular 25 unit/ Total 

Parenteral Nutrition/Amino Acids/Dextrose/ Fat Emulsion Intravenous 1,400 ml @  

58.333 mls/ hr TPN  CONT IV  Last administered on 4/6/20at 22:24;  Start 4/6/20 

at 22:00;  Stop 4/7/20 at 21:59;  Status DC


Propofol 20 ml @ As Directed STK-MED ONCE IV ;  Start 4/6/20 at 11:07;  Stop 

4/6/20 at 11:07;  Status DC


Cellulose (Surgicel Hemostat 4x8) 1 each STK-MED ONCE .ROUTE  Last administered 

on 4/6/20at 11:44;  Start 4/6/20 at 11:55;  Stop 4/6/20 at 11:56;  Status DC


Sevoflurane (Ultane) 60 ml STK-MED ONCE IH ;  Start 4/6/20 at 12:46;  Stop 

4/6/20 at 12:46;  Status DC


Sodium Chloride 1,000 ml @  1,000 mls/hr Q1H PRN IV hypotension;  Start 4/6/20 

at 13:51;  Stop 4/6/20 at 19:50;  Status DC


Albumin Human 200 ml @  200 mls/hr 1X PRN  PRN IV Hypotension Last administered 

on 4/6/20at 14:51;  Start 4/6/20 at 14:00;  Stop 4/6/20 at 19:59;  Status DC


Diphenhydramine HCl (Benadryl) 25 mg 1X PRN  PRN IV ITCHING;  Start 4/6/20 at 

14:00;  Stop 4/7/20 at 13:59;  Status DC


Diphenhydramine HCl (Benadryl) 25 mg 1X PRN  PRN IV ITCHING;  Start 4/6/20 at 

14:00;  Stop 4/7/20 at 13:59;  Status DC


Sodium Chloride 1,000 ml @  400 mls/hr Q2H30M PRN IV PATENCY;  Start 4/6/20 at 

13:51;  Stop 4/7/20 at 01:50;  Status DC


Info (PHARMACY MONITORING -- do not chart) 1 each PRN DAILY  PRN MC SEE 

COMMENTS;  Start 4/6/20 at 14:00;  Stop 4/9/20 at 08:16;  Status DC


Heparin Sodium (Porcine) (Hep Lock Adult) 500 unit STK-MED ONCE IVP ;  Start 

4/7/20 at 09:29;  Stop 4/7/20 at 09:30;  Status DC


Sodium Chloride 1,000 ml @  1,000 mls/hr Q1H PRN IV hypotension;  Start 4/7/20 

at 10:43;  Stop 4/7/20 at 16:42;  Status DC


Sodium Chloride 1,000 ml @  400 mls/hr Q2H30M PRN IV PATENCY;  Start 4/7/20 at 

10:43;  Stop 4/7/20 at 22:42;  Status DC


Info (PHARMACY MONITORING -- do not chart) 1 each PRN DAILY  PRN MC SEE 

COMMENTS;  Start 4/7/20 at 10:45;  Status UNV


Info (PHARMACY MONITORING -- do not chart) 1 each PRN DAILY  PRN MC SEE 

COMMENTS;  Start 4/7/20 at 10:45;  Status UNV


Sodium Chloride 90 meq/Potassium Chloride 15 meq/ Magnesium Sulfate 12 

meq/Calcium Gluconate 15 meq/ Multivitamins 10 ml/Chromium/ Copper/Manganese/ 

Seleni/Zn 0.5 ml/ Insulin Human Regular 25 unit/ Total Parenteral 

Nutrition/Amino Acids/Dextrose/ Fat Emulsion Intravenous 1,400 ml @  58.333 mls/

hr TPN  CONT IV  Last administered on 4/7/20at 22:13;  Start 4/7/20 at 22:00;  

Stop 4/8/20 at 21:59;  Status DC


Sodium Chloride 1,000 ml @  1,000 mls/hr Q1H PRN IV hypotension;  Start 4/8/20 

at 07:50;  Stop 4/8/20 at 13:49;  Status DC


Albumin Human 200 ml @  200 mls/hr 1X  ONCE IV ;  Start 4/8/20 at 08:00;  Stop 

4/8/20 at 08:53;  Status DC


Diphenhydramine HCl (Benadryl) 25 mg 1X PRN  PRN IV ITCHING;  Start 4/8/20 at 

08:00;  Stop 4/9/20 at 07:59;  Status DC


Diphenhydramine HCl (Benadryl) 25 mg 1X PRN  PRN IV ITCHING;  Start 4/8/20 at 

08:00;  Stop 4/9/20 at 07:59;  Status DC


Info (PHARMACY MONITORING -- do not chart) 1 each PRN DAILY  PRN MC SEE MIKEY

TS;  Start 4/8/20 at 08:00;  Stop 4/9/20 at 08:16;  Status DC


Albumin Human 50 ml @ 50 mls/hr 1X  ONCE IV ;  Start 4/8/20 at 08:53;  Stop 

4/8/20 at 08:56;  Status DC


Albumin Human 200 ml @  50 mls/hr PRN 1X  PRN IV HYPOTENSION Last administered 

on 4/14/20at 11:54;  Start 4/8/20 at 09:00;  Stop 5/21/20 at 11:14;  Status DC


Meropenem 500 mg/ Sodium Chloride 50 ml @  100 mls/hr Q12H IV  Last administered

on 4/28/20at 10:45;  Start 4/8/20 at 10:00;  Stop 4/28/20 at 12:37;  Status DC


Sodium Chloride 90 meq/Magnesium Sulfate 12 meq/ Calcium Gluconate 15 meq/ 

Multivitamins 10 ml/Chromium/ Copper/Manganese/ Seleni/Zn 0.5 ml/ Insulin Human 

Regular 25 unit/ Total Parenteral Nutrition/Amino Acids/Dextrose/ Fat Emulsion 

Intravenous 1,400 ml @  58.333 mls/ hr TPN  CONT IV  Last administered on 

4/8/20at 21:41;  Start 4/8/20 at 22:00;  Stop 4/9/20 at 21:59;  Status DC


Sodium Chloride 1,000 ml @  1,000 mls/hr Q1H PRN IV hypotension;  Start 4/9/20 

at 07:58;  Stop 4/9/20 at 13:57;  Status DC


Albumin Human 200 ml @  200 mls/hr 1X PRN  PRN IV Hypotension Last administered 

on 4/9/20at 09:30;  Start 4/9/20 at 08:00;  Stop 4/9/20 at 13:59;  Status DC


Sodium Chloride 1,000 ml @  400 mls/hr Q2H30M PRN IV PATENCY;  Start 4/9/20 at 

07:58;  Stop 4/9/20 at 19:57;  Status DC


Info (PHARMACY MONITORING -- do not chart) 1 each PRN DAILY  PRN MC SEE 

COMMENTS;  Start 4/9/20 at 08:00;  Status Cancel


Info (PHARMACY MONITORING -- do not chart) 1 each PRN DAILY  PRN MC SEE 

COMMENTS;  Start 4/9/20 at 08:15;  Status UNV


Sodium Chloride 90 meq/Potassium Phosphate 5 mmol/ Magnesium Sulfate 12 

meq/Calcium Gluconate 15 meq/ Multivitamins 10 ml/Chromium/ Copper/Manganese/ 

Seleni/Zn 0.5 ml/ Insulin Human Regular 30 unit/ Total Parenteral 

Nutrition/Amino Acids/Dextrose/ Fat Emulsion Intravenous 1,400 ml @  58.333 mls/

hr TPN  CONT IV  Last administered on 4/9/20at 22:08;  Start 4/9/20 at 22:00;  

Stop 4/10/20 at 21:59;  Status DC


Linezolid/Dextrose 300 ml @  300 mls/hr Q12HR IV  Last administered on 4/20/20at

20:40;  Start 4/10/20 at 11:00;  Stop 4/21/20 at 08:10;  Status DC


Sodium Chloride 90 meq/Potassium Phosphate 15 mmol/ Magnesium Sulfate 12 

meq/Calcium Gluconate 15 meq/ Multivitamins 10 ml/Chromium/ Copper/Manganese/ 

Seleni/Zn 0.5 ml/ Insulin Human Regular 30 unit/ Total Parenteral 

Nutrition/Amino Acids/Dextrose/ Fat Emulsion Intravenous 1,400 ml @  58.333 mls/

hr TPN  CONT IV  Last administered on 4/10/20at 21:49;  Start 4/10/20 at 22:00; 

Stop 4/11/20 at 21:59;  Status DC


Sodium Chloride 90 meq/Potassium Phosphate 15 mmol/ Magnesium Sulfate 12 

meq/Calcium Gluconate 15 meq/ Multivitamins 10 ml/Chromium/ Copper/Manganese/ 

Seleni/Zn 0.5 ml/ Insulin Human Regular 40 unit/ Total Parenteral 

Nutrition/Amino Acids/Dextrose/ Fat Emulsion Intravenous 1,400 ml @  58.333 mls/

hr TPN  CONT IV  Last administered on 4/11/20at 21:21;  Start 4/11/20 at 22:00; 

Stop 4/12/20 at 21:59;  Status DC


Sodium Chloride 1,000 ml @  1,000 mls/hr Q1H PRN IV hypotension;  Start 4/11/20 

at 13:26;  Stop 4/11/20 at 19:25;  Status DC


Albumin Human 200 ml @  200 mls/hr 1X PRN  PRN IV Hypotension Last administered 

on 4/11/20at 15:00;  Start 4/11/20 at 13:30;  Stop 4/11/20 at 19:29;  Status DC


Sodium Chloride (Normal Saline Flush) 10 ml 1X PRN  PRN IV AP catheter pack;  

Start 4/11/20 at 13:30;  Stop 4/12/20 at 13:29;  Status DC


Sodium Chloride (Normal Saline Flush) 10 ml 1X PRN  PRN IV  catheter pack;  

Start 4/11/20 at 13:30;  Stop 4/12/20 at 13:29;  Status DC


Sodium Chloride 1,000 ml @  400 mls/hr Q2H30M PRN IV PATENCY;  Start 4/11/20 at 

13:26;  Stop 4/12/20 at 01:25;  Status DC


Info (PHARMACY MONITORING -- do not chart) 1 each PRN DAILY  PRN MC SEE 

COMMENTS;  Start 4/11/20 at 13:30;  Stop 4/11/20 at 13:33;  Status DC


Info (PHARMACY MONITORING -- do not chart) 1 each PRN DAILY  PRN MC SEE 

COMMENTS;  Start 4/11/20 at 13:30;  Stop 4/11/20 at 13:34;  Status DC


Sodium Chloride 90 meq/Potassium Phosphate 19 mmol/ Magnesium Sulfate 12 

meq/Calcium Gluconate 15 meq/ Multivitamins 10 ml/Chromium/ Copper/Manganese/ 

Seleni/Zn 0.5 ml/ Insulin Human Regular 40 unit/ Total Parenteral 

Nutrition/Amino Acids/Dextrose/ Fat Emulsion Intravenous 1,400 ml @  58.333 mls/

hr TPN  CONT IV  Last administered on 4/12/20at 21:54;  Start 4/12/20 at 22:00; 

Stop 4/13/20 at 21:59;  Status DC


Sodium Chloride 1,000 ml @  1,000 mls/hr Q1H PRN IV hypotension;  Start 4/13/20 

at 09:35;  Stop 4/13/20 at 15:34;  Status DC


Albumin Human 200 ml @  200 mls/hr 1X PRN  PRN IV Hypotension;  Start 4/13/20 at

09:45;  Stop 4/13/20 at 15:44;  Status DC


Diphenhydramine HCl (Benadryl) 25 mg 1X PRN  PRN IV ITCHING;  Start 4/13/20 at 

09:45;  Stop 4/14/20 at 09:44;  Status DC


Diphenhydramine HCl (Benadryl) 25 mg 1X PRN  PRN IV ITCHING;  Start 4/13/20 at 

09:45;  Stop 4/14/20 at 09:44;  Status DC


Sodium Chloride 1,000 ml @  400 mls/hr Q2H30M PRN IV PATENCY;  Start 4/13/20 at 

09:35;  Stop 4/13/20 at 21:34;  Status DC


Info (PHARMACY MONITORING -- do not chart) 1 each PRN DAILY  PRN MC SEE 

COMMENTS;  Start 4/13/20 at 09:45;  Status Cancel


Sodium Chloride 100 meq/Potassium Phosphate 19 mmol/ Magnesium Sulfate 12 

meq/Calcium Gluconate 15 meq/ Multivitamins 10 ml/Chromium/ Copper/Manganese/ 

Seleni/Zn 0.5 ml/ Insulin Human Regular 40 unit/ Potassium Chloride 20 meq/ 

Total Parenteral Nutrition/Amino Acids/Dextrose/ Fat Emulsion Intravenous 1,400 

ml @  58.333 mls/ hr TPN  CONT IV  Last administered on 4/13/20at 22:02;  Start 

4/13/20 at 22:00;  Stop 4/14/20 at 21:59;  Status DC


Furosemide (Lasix) 40 mg 1X  ONCE IVP  Last administered on 4/13/20at 14:39;  

Start 4/13/20 at 14:30;  Stop 4/13/20 at 14:31;  Status DC


Metronidazole 100 ml @  100 mls/hr Q8HRS IV  Last administered on 4/21/20at 

06:04;  Start 4/14/20 at 10:00;  Stop 4/21/20 at 08:10;  Status DC


Sodium Chloride 1,000 ml @  1,000 mls/hr Q1H PRN IV hypotension;  Start 4/14/20 

at 08:00;  Stop 4/14/20 at 13:59;  Status DC


Albumin Human 200 ml @  200 mls/hr 1X PRN  PRN IV Hypotension;  Start 4/14/20 at

08:00;  Stop 4/14/20 at 13:59;  Status DC


Sodium Chloride 1,000 ml @  400 mls/hr Q2H30M PRN IV PATENCY;  Start 4/14/20 at 

08:00;  Stop 4/14/20 at 19:59;  Status DC


Info (PHARMACY MONITORING -- do not chart) 1 each PRN DAILY  PRN MC SEE 

COMMENTS;  Start 4/14/20 at 11:30;  Status UNV


Info (PHARMACY MONITORING -- do not chart) 1 each PRN DAILY  PRN MC SEE 

COMMENTS;  Start 4/14/20 at 11:30;  Stop 4/16/20 at 12:13;  Status DC


Sodium Chloride 100 meq/Potassium Phosphate 19 mmol/ Magnesium Sulfate 12 

meq/Calcium Gluconate 15 meq/ Multivitamins 10 ml/Chromium/ Copper/Manganese/ 

Seleni/Zn 0.5 ml/ Insulin Human Regular 40 unit/ Potassium Chloride 20 meq/ 

Total Parenteral Nutrition/Amino Acids/Dextrose/ Fat Emulsion Intravenous 1,400 

ml @  58.333 mls/ hr TPN  CONT IV  Last administered on 4/14/20at 21:52;  Start 

4/14/20 at 22:00;  Stop 4/15/20 at 21:59;  Status DC


Sodium Chloride (Normal Saline Flush) 10 ml QSHIFT  PRN IV AFTER MEDS AND BLOOD 

DRAWS;  Start 4/14/20 at 15:00;  Stop 5/12/20 at 11:27;  Status DC


Sodium Chloride (Normal Saline Flush) 10 ml PRN Q5MIN  PRN IV AFTER MEDS AND 

BLOOD DRAWS;  Start 4/14/20 at 15:00


Sodium Chloride (Normal Saline Flush) 20 ml PRN Q5MIN  PRN IV AFTER MEDS AND 

BLOOD DRAWS;  Start 4/14/20 at 15:00


Sodium Chloride 100 meq/Potassium Phosphate 19 mmol/ Magnesium Sulfate 12 

meq/Calcium Gluconate 15 meq/ Multivitamins 10 ml/Chromium/ Copper/Manganese/ 

Seleni/Zn 0.5 ml/ Insulin Human Regular 40 unit/ Potassium Chloride 20 meq/ 

Total Parenteral Nutrition/Amino Acids/Dextrose/ Fat Emulsion Intravenous 1,400 

ml @  58.333 mls/ hr TPN  CONT IV  Last administered on 4/15/20at 21:20;  Start 

4/15/20 at 22:00;  Stop 4/16/20 at 21:59;  Status DC


Lidocaine HCl (Buffered Lidocaine 1%) 3 ml STK-MED ONCE .ROUTE ;  Start 4/15/20 

at 13:16;  Stop 4/15/20 at 13:16;  Status DC


Lidocaine HCl (Buffered Lidocaine 1%) 6 ml 1X  ONCE INJ  Last administered on 

4/15/20at 13:45;  Start 4/15/20 at 13:30;  Stop 4/15/20 at 13:31;  Status DC


Albumin Human 100 ml @  100 mls/hr 1X  ONCE IV  Last administered on 4/15/20at 

15:41;  Start 4/15/20 at 15:00;  Stop 4/15/20 at 15:59;  Status DC


Albumin Human 50 ml @ 50 mls/hr 1X  ONCE IV  Last administered on 4/15/20at 

15:00;  Start 4/15/20 at 15:00;  Stop 4/15/20 at 15:59;  Status DC


Info (PHARMACY MONITORING -- do not chart) 1 each PRN DAILY  PRN MC SEE 

COMMENTS;  Start 4/16/20 at 11:30;  Status Cancel


Info (PHARMACY MONITORING -- do not chart) 1 each PRN DAILY  PRN MC SEE 

COMMENTS;  Start 4/16/20 at 11:30;  Status UNV


Sodium Chloride 100 meq/Potassium Phosphate 10 mmol/ Magnesium Sulfate 12 

meq/Calcium Gluconate 15 meq/ Multivitamins 10 ml/Chromium/ Copper/Manganese/ 

Seleni/Zn 0.5 ml/ Insulin Human Regular 35 unit/ Potassium Chloride 20 meq/ 

Total Parenteral Nutrition/Amino Acids/Dextrose/ Fat Emulsion Intravenous 1,400 

ml @  58.333 mls/ hr TPN  CONT IV  Last administered on 4/16/20at 22:10;  Start 

4/16/20 at 22:00;  Stop 4/17/20 at 21:59;  Status DC


Sodium Chloride 100 meq/Potassium Phosphate 5 mmol/ Magnesium Sulfate 12 

meq/Calcium Gluconate 15 meq/ Multivitamins 10 ml/Chromium/ Copper/Manganese/ 

Seleni/Zn 0.5 ml/ Insulin Human Regular 35 unit/ Potassium Chloride 20 meq/ 

Total Parenteral Nutrition/Amino Acids/Dextrose/ Fat Emulsion Intravenous 1,400 

ml @  58.333 mls/ hr TPN  CONT IV  Last administered on 4/17/20at 22:59;  Start 

4/17/20 at 22:00;  Stop 4/18/20 at 21:59;  Status DC


Sodium Chloride 1,000 ml @  1,000 mls/hr Q1H PRN IV hypotension;  Start 4/18/20 

at 08:27;  Stop 4/18/20 at 14:26;  Status DC


Albumin Human 200 ml @  200 mls/hr 1X PRN  PRN IV Hypotension Last administered 

on 4/18/20at 09:18;  Start 4/18/20 at 08:30;  Stop 4/18/20 at 14:29;  Status DC


Sodium Chloride 1,000 ml @  400 mls/hr Q2H30M PRN IV PATENCY;  Start 4/18/20 at 

08:27;  Stop 4/18/20 at 20:26;  Status DC


Info (PHARMACY MONITORING -- do not chart) 1 each PRN DAILY  PRN MC SEE 

COMMENTS;  Start 4/18/20 at 08:30;  Status Cancel


Info (PHARMACY MONITORING -- do not chart) 1 each PRN DAILY  PRN MC SEE 

COMMENTS;  Start 4/18/20 at 08:30;  Stop 4/26/20 at 13:10;  Status DC


Sodium Chloride 100 meq/Potassium Chloride 40 meq/ Magnesium Sulfate 15 

meq/Calcium Gluconate 15 meq/ Multivitamins 10 ml/Chromium/ Copper/Manganese/ 

Seleni/Zn 0.5 ml/ Insulin Human Regular 35 unit/ Total Parenteral 

Nutrition/Amino Acids/Dextrose/ Fat Emulsion Intravenous 1,400 ml @  58.333 mls/

hr TPN  CONT IV  Last administered on 4/18/20at 22:00;  Start 4/18/20 at 22:00; 

Stop 4/19/20 at 21:59;  Status DC


Potassium Chloride/Water 100 ml @  100 mls/hr 1X  ONCE IV  Last administered on 

4/18/20at 17:28;  Start 4/18/20 at 14:45;  Stop 4/18/20 at 15:44;  Status DC


Sodium Chloride 100 meq/Potassium Chloride 40 meq/ Magnesium Sulfate 15 

meq/Calcium Gluconate 15 meq/ Multivitamins 10 ml/Chromium/ Copper/Manganese/ 

Seleni/Zn 0.5 ml/ Insulin Human Regular 35 unit/ Total Parenteral 

Nutrition/Amino Acids/Dextrose/ Fat Emulsion Intravenous 1,400 ml @  58.333 mls/

hr TPN  CONT IV  Last administered on 4/19/20at 22:46;  Start 4/19/20 at 22:00; 

Stop 4/20/20 at 21:59;  Status DC


Sodium Chloride 100 meq/Potassium Chloride 40 meq/ Magnesium Sulfate 20 

meq/Calcium Gluconate 15 meq/ Multivitamins 10 ml/Chromium/ Copper/Manganese/ 

Seleni/Zn 0.5 ml/ Insulin Human Regular 35 unit/ Total Parenteral 

Nutrition/Amino Acids/Dextrose/ Fat Emulsion Intravenous 1,400 ml @  58.333 mls/

hr TPN  CONT IV  Last administered on 4/20/20at 22:31;  Start 4/20/20 at 22:00; 

Stop 4/21/20 at 21:59;  Status DC


Fentanyl Citrate (Fentanyl 2ml Vial) 50 mcg PRN Q2HR  PRN IVP PAIN Last 

administered on 4/27/20at 13:32;  Start 4/20/20 at 21:00;  Stop 4/28/20 at 

12:53;  Status DC


Fentanyl Citrate (Fentanyl 2ml Vial) 25 mcg PRN Q2HR  PRN IVP PAIN;  Start 

4/20/20 at 21:00;  Stop 4/28/20 at 12:54;  Status DC


Enoxaparin Sodium (Lovenox 100mg Syringe) 100 mg Q12HR SQ ;  Start 4/21/20 at 

21:00;  Status UNV


Amino Acids/ Glycerin/ Electrolytes 1,000 ml @  75 mls/hr [id number] IV ;  Start 

4/20/20 at 21:15;  Status UNV


Sodium Chloride 1,000 ml @  1,000 mls/hr Q1H PRN IV hypotension;  Start 4/21/20 

at 07:56;  Stop 4/21/20 at 13:55;  Status DC


Albumin Human 200 ml @  200 mls/hr 1X PRN  PRN IV Hypotension Last administered 

on 4/21/20at 08:40;  Start 4/21/20 at 08:00;  Stop 4/21/20 at 13:59;  Status DC


Sodium Chloride 1,000 ml @  400 mls/hr Q2H30M PRN IV PATENCY;  Start 4/21/20 at 

07:56;  Stop 4/21/20 at 19:55;  Status DC


Info (PHARMACY MONITORING -- do not chart) 1 each PRN DAILY  PRN MC SEE 

COMMENTS;  Start 4/21/20 at 08:00;  Status UNV


Info (PHARMACY MONITORING -- do not chart) 1 each PRN DAILY  PRN MC SEE 

COMMENTS;  Start 4/21/20 at 08:00;  Status UNV


Daptomycin 430 mg/ Sodium Chloride 50 ml @  100 mls/hr Q24H IV  Last 

administered on 4/21/20at 12:35;  Start 4/21/20 at 09:00;  Stop 4/21/20 at 

12:49;  Status DC


Sodium Chloride 100 meq/Potassium Chloride 40 meq/ Magnesium Sulfate 20 

meq/Calcium Gluconate 15 meq/ Multivitamins 10 ml/Chromium/ Copper/Manganese/ 

Seleni/Zn 0.5 ml/ Insulin Human Regular 35 unit/ Total Parenteral 

Nutrition/Amino Acids/Dextrose/ Fat Emulsion Intravenous 1,400 ml @  58.333 mls/

hr TPN  CONT IV  Last administered on 4/21/20at 21:26;  Start 4/21/20 at 22:00; 

Stop 4/22/20 at 21:59;  Status DC


Daptomycin 430 mg/ Sodium Chloride 50 ml @  100 mls/hr Q48H IV ;  Start 4/23/20 

at 09:00;  Stop 4/22/20 at 11:55;  Status DC


Sodium Chloride 100 meq/Potassium Chloride 40 meq/ Magnesium Sulfate 20 

meq/Calcium Gluconate 15 meq/ Multivitamins 10 ml/Chromium/ Copper/Manganese/ 

Seleni/Zn 0.5 ml/ Insulin Human Regular 35 unit/ Total Parenteral 

Nutrition/Amino Acids/Dextrose/ Fat Emulsion Intravenous 1,400 ml @  58.333 mls/

hr TPN  CONT IV  Last administered on 4/22/20at 22:27;  Start 4/22/20 at 22:00; 

Stop 4/23/20 at 21:59;  Status DC


Daptomycin 430 mg/ Sodium Chloride 50 ml @  100 mls/hr Q24H IV  Last administ

ered on 4/24/20at 15:07;  Start 4/22/20 at 13:00;  Stop 4/25/20 at 13:15;  

Status DC


Sodium Chloride 100 meq/Potassium Chloride 40 meq/ Magnesium Sulfate 20 

meq/Calcium Gluconate 10 meq/ Multivitamins 10 ml/Chromium/ Copper/Manganese/ 

Seleni/Zn 0.5 ml/ Insulin Human Regular 35 unit/ Total Parenteral 

Nutrition/Amino Acids/Dextrose/ Fat Emulsion Intravenous 1,400 ml @  58.333 mls/

hr TPN  CONT IV  Last administered on 4/24/20at 00:06;  Start 4/23/20 at 22:00; 

Stop 4/24/20 at 21:59;  Status DC


Alteplase, Recombinant (Cathflo For Central Catheter Clearance) 1 mg 1X  ONCE 

INT CAT  Last administered on 4/24/20at 11:44;  Start 4/24/20 at 10:45;  Stop 

4/24/20 at 10:46;  Status DC


Ondansetron HCl (Zofran) 4 mg PRN Q6HRS  PRN IV NAUSEA/VOMITING;  Start 4/27/20 

at 07:00;  Stop 4/28/20 at 06:59;  Status DC


Fentanyl Citrate (Fentanyl 2ml Vial) 25 mcg PRN Q5MIN  PRN IV MILD PAIN 1-3;  

Start 4/27/20 at 07:00;  Stop 4/28/20 at 06:59;  Status DC


Fentanyl Citrate (Fentanyl 2ml Vial) 50 mcg PRN Q5MIN  PRN IV MODERATE TO SEVERE

PAIN Last administered on 4/27/20at 10:17;  Start 4/27/20 at 07:00;  Stop 

4/28/20 at 06:59;  Status DC


Ringer's Solution 1,000 ml @  30 mls/hr Q24H IV ;  Start 4/27/20 at 07:00;  Stop

4/27/20 at 18:59;  Status DC


Lidocaine HCl (Xylocaine-Mpf 1% 2ml Vial) 2 ml PRN 1X  PRN ID PRIOR TO IV START;

 Start 4/27/20 at 07:00;  Stop 4/28/20 at 06:59;  Status DC


Prochlorperazine Edisylate (Compazine) 5 mg PACU PRN  PRN IV NAUSEA, MRX1;  

Start 4/27/20 at 07:00;  Stop 4/28/20 at 06:59;  Status DC


Sodium Acetate 50 meq/Potassium Acetate 55 meq/ Magnesium Sulfate 20 meq/Calcium

Gluconate 10 meq/ Multivitamins 10 ml/Chromium/ Copper/Manganese/ Seleni/Zn 0.5 

ml/ Insulin Human Regular 35 unit/ Total Parenteral Nutrition/Amino 

Acids/Dextrose/ Fat Emulsion Intravenous 1,400 ml @  58.333 mls/ hr TPN  CONT IV

;  Start 4/24/20 at 22:00;  Stop 4/24/20 at 14:15;  Status DC


Sodium Acetate 50 meq/Potassium Acetate 55 meq/ Magnesium Sulfate 20 meq/Calcium

Gluconate 10 meq/ Multivitamins 10 ml/Chromium/ Copper/Manganese/ Seleni/Zn 0.5 

ml/ Insulin Human Regular 35 unit/ Total Parenteral Nutrition/Amino 

Acids/Dextrose/ Fat Emulsion Intravenous 1,800 ml @  75 mls/hr TPN  CONT IV  

Last administered on 4/24/20at 22:38;  Start 4/24/20 at 22:00;  Stop 4/25/20 at 

21:59;  Status DC


Sodium Chloride 1,000 ml @  1,000 mls/hr Q1H PRN IV hypotension;  Start 4/24/20 

at 15:31;  Stop 4/24/20 at 21:30;  Status DC


Diphenhydramine HCl (Benadryl) 25 mg 1X PRN  PRN IV ITCHING;  Start 4/24/20 at 

15:45;  Stop 4/25/20 at 15:44;  Status DC


Diphenhydramine HCl (Benadryl) 25 mg 1X PRN  PRN IV ITCHING;  Start 4/24/20 at 

15:45;  Stop 4/25/20 at 15:44;  Status DC


Sodium Chloride 1,000 ml @  400 mls/hr Q2H30M PRN IV PATENCY;  Start 4/24/20 at 

15:31;  Stop 4/25/20 at 03:30;  Status DC


Info (PHARMACY MONITORING -- do not chart) 1 each PRN DAILY  PRN MC SEE 

COMMENTS;  Start 4/24/20 at 15:45;  Stop 5/26/20 at 14:14;  Status DC


Sodium Acetate 50 meq/Potassium Acetate 55 meq/ Magnesium Sulfate 20 meq/Calcium

Gluconate 10 meq/ Multivitamins 10 ml/Chromium/ Copper/Manganese/ Seleni/Zn 0.5 

ml/ Insulin Human Regular 35 unit/ Total Parenteral Nutrition/Amino 

Acids/Dextrose/ Fat Emulsion Intravenous 1,800 ml @  75 mls/hr TPN  CONT IV  

Last administered on 4/25/20at 22:03;  Start 4/25/20 at 22:00;  Stop 4/26/20 at 

21:59;  Status DC


Daptomycin 430 mg/ Sodium Chloride 50 ml @  100 mls/hr Q24H IV  Last 

administered on 4/30/20at 13:00;  Start 4/25/20 at 13:00;  Stop 4/30/20 at 

20:58;  Status DC


Heparin Sodium (Porcine) 1000 unit/Sodium Chloride 1,001 ml @  1,001 mls/hr 1X  

ONCE IRR ;  Start 4/27/20 at 06:00;  Stop 4/27/20 at 06:59;  Status DC


Potassium Acetate 55 meq/Magnesium Sulfate 20 meq/ Calcium Gluconate 10 meq/ 

Multivitamins 10 ml/Chromium/ Copper/Manganese/ Seleni/Zn 0.5 ml/ Insulin Human 

Regular 35 unit/ Total Parenteral Nutrition/Amino Acids/Dextrose/ Fat Emulsion 

Intravenous 1,920 ml @  80 mls/hr TPN  CONT IV  Last administered on 4/26/20at 

22:10;  Start 4/26/20 at 22:00;  Stop 4/27/20 at 21:59;  Status DC


Dexamethasone Sodium Phosphate (Decadron) 4 mg STK-MED ONCE .ROUTE ;  Start 

4/27/20 at 10:56;  Stop 4/27/20 at 10:57;  Status DC


Ondansetron HCl (Zofran) 4 mg STK-MED ONCE .ROUTE ;  Start 4/27/20 at 10:56;  

Stop 4/27/20 at 10:57;  Status DC


Rocuronium Bromide (Zemuron) 50 mg STK-MED ONCE .ROUTE ;  Start 4/27/20 at 

10:56;  Stop 4/27/20 at 10:57;  Status DC


Fentanyl Citrate (Fentanyl 2ml Vial) 100 mcg STK-MED ONCE .ROUTE ;  Start 

4/27/20 at 10:56;  Stop 4/27/20 at 10:57;  Status DC


Bupivacaine HCl/ Epinephrine Bitart (Sensorcain-Epi 0.5%-1:213123 Mpf) 30 ml 

STK-MED ONCE .ROUTE  Last administered on 4/27/20at 12:01;  Start 4/27/20 at 

10:58;  Stop 4/27/20 at 10:58;  Status DC


Cellulose (Surgicel Hemostat 2x14) 1 each STK-MED ONCE .ROUTE ;  Start 4/27/20 

at 10:58;  Stop 4/27/20 at 10:59;  Status DC


Iohexol (Omnipaque 300 Mg/ml) 50 ml STK-MED ONCE .ROUTE ;  Start 4/27/20 at 

10:58;  Stop 4/27/20 at 10:59;  Status DC


Cellulose (Surgicel Hemostat 4x8) 1 each STK-MED ONCE .ROUTE ;  Start 4/27/20 at

10:58;  Stop 4/27/20 at 10:59;  Status DC


Bisacodyl (Dulcolax Supp) 10 mg STK-MED ONCE .ROUTE ;  Start 4/27/20 at 10:59;  

Stop 4/27/20 at 10:59;  Status DC


Heparin Sodium (Porcine) 1000 unit/Sodium Chloride 1,001 ml @  1,001 mls/hr 1X  

ONCE IRR ;  Start 4/27/20 at 12:00;  Stop 4/27/20 at 12:59;  Status DC


Propofol 20 ml @ As Directed STK-MED ONCE IV ;  Start 4/27/20 at 11:05;  Stop 

4/27/20 at 11:05;  Status DC


Sevoflurane (Ultane) 90 ml STK-MED ONCE IH ;  Start 4/27/20 at 11:05;  Stop 

4/27/20 at 11:05;  Status DC


Sevoflurane (Ultane) 60 ml STK-MED ONCE IH ;  Start 4/27/20 at 12:26;  Stop 

4/27/20 at 12:27;  Status DC


Propofol 20 ml @ As Directed STK-MED ONCE IV ;  Start 4/27/20 at 12:26;  Stop 

4/27/20 at 12:27;  Status DC


Phenylephrine HCl (PHENYLEPHRINE in 0.9% NACL PF) 1 mg STK-MED ONCE IV ;  Start 

4/27/20 at 12:34;  Stop 4/27/20 at 12:34;  Status DC


Heparin Sodium (Porcine) (Heparin Sodium) 5,000 unit Q12HR SQ  Last administered

on 5/6/20at 20:57;  Start 4/27/20 at 21:00;  Stop 5/7/20 at 09:59;  Status DC


Sodium Chloride (Normal Saline Flush) 3 ml QSHIFT  PRN IV AFTER MEDS AND BLOOD 

DRAWS;  Start 4/27/20 at 13:45


Naloxone HCl (Narcan) 0.4 mg PRN Q2MIN  PRN IV SEE INSTRUCTIONS Last 

administered on 6/6/20at 15:15;  Start 4/27/20 at 13:45


Sodium Chloride 1,000 ml @  25 mls/hr Q24H IV  Last administered on 5/26/20at 

13:37;  Start 4/27/20 at 13:37;  Stop 5/29/20 at 13:09;  Status DC


Naloxone HCl (Narcan) 0.4 mg PRN Q2MIN  PRN IV SEE INSTRUCTIONS;  Start 4/27/20 

at 14:30;  Status UNV


Sodium Chloride 1,000 ml @  25 mls/hr Q24H IV ;  Start 4/27/20 at 14:30;  Status

UNV


Hydromorphone HCl 30 ml @ 0 mls/hr CONT PRN  PRN IV PER PROTOCOL Last 

administered on 5/2/20at 16:08;  Start 4/27/20 at 14:30;  Stop 5/4/20 at 08:55; 

Status DC


Potassium Acetate 55 meq/Magnesium Sulfate 20 meq/ Calcium Gluconate 10 meq/ 

Multivitamins 10 ml/Chromium/ Copper/Manganese/ Seleni/Zn 0.5 ml/ Insulin Human 

Regular 35 unit/ Total Parenteral Nutrition/Amino Acids/Dextrose/ Fat Emulsion 

Intravenous 1,920 ml @  80 mls/hr TPN  CONT IV  Last administered on 4/27/20at 

22:01;  Start 4/27/20 at 22:00;  Stop 4/28/20 at 21:59;  Status DC


Bumetanide (Bumex) 2 mg BID92 IV  Last administered on 5/1/20at 13:50;  Start 

4/28/20 at 14:00;  Stop 5/2/20 at 14:10;  Status DC


Meropenem 1 gm/ Sodium Chloride 100 ml @  200 mls/hr Q8HRS IV  Last administered

on 5/22/20at 05:53;  Start 4/28/20 at 14:00;  Stop 5/22/20 at 09:31;  Status DC


Potassium Acetate 55 meq/Magnesium Sulfate 20 meq/ Calcium Gluconate 10 meq/ 

Multivitamins 10 ml/Chromium/ Copper/Manganese/ Seleni/Zn 0.5 ml/ Insulin Human 

Regular 35 unit/ Total Parenteral Nutrition/Amino Acids/Dextrose/ Fat Emulsion 

Intravenous 1,920 ml @  80 mls/hr TPN  CONT IV  Last administered on 4/28/20at 

22:02;  Start 4/28/20 at 22:00;  Stop 4/29/20 at 21:59;  Status DC


Hydromorphone HCl (Dilaudid Standard PCA) 12 mg STK-MED ONCE IV ;  Start 4/27/20

at 14:35;  Stop 4/28/20 at 13:53;  Status DC


Artificial Tears (Artificial Tears) 1 drop PRN Q15MIN  PRN OU DRY EYE Last 

administered on 5/29/20at 10:08;  Start 4/29/20 at 05:30


Hydromorphone HCl (Dilaudid Standard PCA) 12 mg STK-MED ONCE IV ;  Start 4/28/20

at 12:05;  Stop 4/29/20 at 09:15;  Status DC


Potassium Acetate 65 meq/Magnesium Sulfate 20 meq/ Calcium Gluconate 10 meq/ 

Multivitamins 10 ml/Chromium/ Copper/Manganese/ Seleni/Zn 0.5 ml/ Insulin Human 

Regular 30 unit/ Total Parenteral Nutrition/Amino Acids/Dextrose/ Fat Emulsion 

Intravenous 1,920 ml @  80 mls/hr TPN  CONT IV  Last administered on 4/29/20at 

22:22;  Start 4/29/20 at 22:00;  Stop 4/30/20 at 21:59;  Status DC


Cyclobenzaprine HCl (Flexeril) 10 mg PRN Q6HRS  PRN PO MUSCLE SPASMS;  Start 

4/30/20 at 10:45


Potassium Acetate 55 meq/Magnesium Sulfate 20 meq/ Calcium Gluconate 10 meq/ 

Multivitamins 10 ml/Chromium/ Copper/Manganese/ Seleni/Zn 0.5 ml/ Insulin Human 

Regular 30 unit/ Total Parenteral Nutrition/Amino Acids/Dextrose/ Fat Emulsion 

Intravenous 1,920 ml @  80 mls/hr TPN  CONT IV  Last administered on 5/1/20at 

01:00;  Start 4/30/20 at 22:00;  Stop 5/1/20 at 21:59;  Status DC


Magnesium Sulfate 50 ml @ 25 mls/hr 1X  ONCE IV  Last administered on 4/30/20at 

17:18;  Start 4/30/20 at 12:45;  Stop 4/30/20 at 14:44;  Status DC


Potassium Chloride/Water 100 ml @  100 mls/hr 1X  ONCE IV  Last administered on 

5/1/20at 11:27;  Start 5/1/20 at 12:00;  Stop 5/1/20 at 12:59;  Status DC


Hydromorphone HCl (Dilaudid Standard PCA) 12 mg STK-MED ONCE IV ;  Start 4/29/20

at 10:50;  Stop 5/1/20 at 11:02;  Status DC


Hydromorphone HCl (Dilaudid Standard PCA) 12 mg STK-MED ONCE IV ;  Start 4/30/20

at 13:47;  Stop 5/1/20 at 11:03;  Status DC


Potassium Acetate 30 meq/Magnesium Sulfate 20 meq/ Calcium Gluconate 10 meq/ 

Multivitamins 10 ml/Chromium/ Copper/Manganese/ Seleni/Zn 0.5 ml/ Insulin Human 

Regular 30 unit/ Potassium Chloride 30 meq/ Total Parenteral Nutrition/Amino A

cids/Dextrose/ Fat Emulsion Intravenous 1,920 ml @  80 mls/hr TPN  CONT IV  Last

administered on 5/1/20at 22:34;  Start 5/1/20 at 22:00;  Stop 5/2/20 at 21:59;  

Status DC


Potassium Chloride/Water 100 ml @  100 mls/hr Q1H IV  Last administered on 

5/2/20at 13:05;  Start 5/2/20 at 07:00;  Stop 5/2/20 at 10:59;  Status DC


Magnesium Sulfate 50 ml @ 25 mls/hr 1X  ONCE IV  Last administered on 5/2/20at 

10:34;  Start 5/2/20 at 10:30;  Stop 5/2/20 at 12:29;  Status DC


Potassium Chloride 75 meq/ Magnesium Sulfate 20 meq/Calcium Gluconate 10 meq/ 

Multivitamins 10 ml/Chromium/ Copper/Manganese/ Seleni/Zn 0.5 ml/ Insulin Human 

Regular 30 unit/ Total Parenteral Nutrition/Amino Acids/Dextrose/ Fat Emulsion 

Intravenous 1,920 ml @  80 mls/hr TPN  CONT IV  Last administered on 5/2/20at 

21:51;  Start 5/2/20 at 22:00;  Stop 5/3/20 at 22:00;  Status DC


Potassium Chloride 75 meq/ Magnesium Sulfate 20 meq/Calcium Gluconate 10 meq/ 

Multivitamins 10 ml/Chromium/ Copper/Manganese/ Seleni/Zn 0.5 ml/ Insulin Human 

Regular 25 unit/ Total Parenteral Nutrition/Amino Acids/Dextrose/ Fat Emulsion 

Intravenous 1,920 ml @  80 mls/hr TPN  CONT IV  Last administered on 5/3/20at 

22:04;  Start 5/3/20 at 22:00;  Stop 5/4/20 at 21:59;  Status DC


Hydromorphone HCl (Dilaudid) 0.4 mg PRN Q4HRS  PRN IVP PAIN Last administered on

5/4/20at 10:57;  Start 5/4/20 at 09:00;  Stop 5/4/20 at 18:59;  Status DC


Micafungin Sodium 100 mg/Dextrose 100 ml @  100 mls/hr Q24H IV  Last 

administered on 5/26/20at 12:17;  Start 5/4/20 at 11:00;  Stop 5/27/20 at 09:59;

 Status DC


Daptomycin 485 mg/ Sodium Chloride 50 ml @  100 mls/hr Q24H IV  Last 

administered on 5/11/20at 13:10;  Start 5/4/20 at 11:00;  Stop 5/12/20 at 07:44;

 Status DC


Potassium Chloride 75 meq/ Magnesium Sulfate 15 meq/Calcium Gluconate 8 meq/ 

Multivitamins 10 ml/Chromium/ Copper/Manganese/ Seleni/Zn 0.5 ml/ Insulin Human 

Regular 25 unit/ Total Parenteral Nutrition/Amino Acids/Dextrose/ Fat Emulsion 

Intravenous 1,920 ml @  80 mls/hr TPN  CONT IV  Last administered on 5/4/20at 

23:08;  Start 5/4/20 at 22:00;  Stop 5/5/20 at 21:59;  Status DC


Haloperidol Lactate (Haldol Inj) 3 mg 1X  ONCE IVP  Last administered on 

5/4/20at 14:37;  Start 5/4/20 at 14:30;  Stop 5/4/20 at 14:31;  Status DC


Hydromorphone HCl (Dilaudid) 1 mg PRN Q4HRS  PRN IVP PAIN Last administered on 

5/18/20at 06:25;  Start 5/4/20 at 19:00;  Stop 5/18/20 at 17:10;  Status DC


Potassium Chloride 75 meq/ Magnesium Sulfate 15 meq/Calcium Gluconate 8 meq/ 

Multivitamins 10 ml/Chromium/ Copper/Manganese/ Seleni/Zn 0.5 ml/ Insulin Human 

Regular 20 unit/ Total Parenteral Nutrition/Amino Acids/Dextrose/ Fat Emulsion 

Intravenous 1,920 ml @  80 mls/hr TPN  CONT IV  Last administered on 5/5/20at 

22:10;  Start 5/5/20 at 22:00;  Stop 5/6/20 at 21:59;  Status DC


Lidocaine HCl (Buffered Lidocaine 1%) 3 ml STK-MED ONCE .ROUTE ;  Start 5/6/20 

at 11:31;  Stop 5/6/20 at 11:31;  Status DC


Lidocaine HCl (Buffered Lidocaine 1%) 3 ml STK-MED ONCE .ROUTE ;  Start 5/6/20 

at 12:28;  Stop 5/6/20 at 12:29;  Status DC


Lidocaine HCl (Buffered Lidocaine 1%) 6 ml 1X  ONCE INJ  Last administered on 

5/6/20at 12:53;  Start 5/6/20 at 12:45;  Stop 5/6/20 at 12:46;  Status DC


Potassium Chloride 75 meq/ Magnesium Sulfate 15 meq/Calcium Gluconate 8 meq/ 

Multivitamins 10 ml/Chromium/ Copper/Manganese/ Seleni/Zn 0.5 ml/ Insulin Human 

Regular 20 unit/ Total Parenteral Nutrition/Amino Acids/Dextrose/ Fat Emulsion 

Intravenous 1,920 ml @  80 mls/hr TPN  CONT IV  Last administered on 5/6/20at 

22:00;  Start 5/6/20 at 22:00;  Stop 5/7/20 at 21:59;  Status DC


Potassium Chloride 75 meq/ Magnesium Sulfate 15 meq/Calcium Gluconate 8 meq/ 

Multivitamins 10 ml/Chromium/ Copper/Manganese/ Seleni/Zn 0.5 ml/ Insulin Human 

Regular 15 unit/ Total Parenteral Nutrition/Amino Acids/Dextrose/ Fat Emulsion 

Intravenous 1,920 ml @  80 mls/hr TPN  CONT IV  Last administered on 5/7/20at 

22:28;  Start 5/7/20 at 22:00;  Stop 5/8/20 at 21:59;  Status DC


Vecuronium Bromide (Norcuron Bolus) 6 mg PRN Q6HRS  PRN IV VENT ASYNCHRONY;  

Start 5/7/20 at 19:15;  Stop 5/7/20 at 19:35;  Status DC


Bumetanide (Bumex) 2 mg 1X  ONCE IV  Last administered on 5/7/20at 22:09;  Start

5/7/20 at 19:45;  Stop 5/7/20 at 19:46;  Status DC


Lidocaine HCl (Buffered Lidocaine 1%) 3 ml STK-MED ONCE .ROUTE ;  Start 5/8/20 

at 07:59;  Stop 5/8/20 at 07:59;  Status DC


Midazolam HCl (Versed) 5 mg STK-MED ONCE .ROUTE ;  Start 5/8/20 at 08:36;  Stop 

5/8/20 at 08:36;  Status DC


Fentanyl Citrate (Fentanyl 5ml Vial) 250 mcg STK-MED ONCE .ROUTE ;  Start 5/8/20

at 08:36;  Stop 5/8/20 at 08:37;  Status DC


Lidocaine HCl (Buffered Lidocaine 1%) 3 ml 1X  ONCE IJ  Last administered on 

5/8/20at 09:30;  Start 5/8/20 at 09:15;  Stop 5/8/20 at 09:16;  Status DC


Midazolam HCl (Versed) 5 mg 1X  ONCE IV  Last administered on 5/8/20at 09:30;  

Start 5/8/20 at 09:15;  Stop 5/8/20 at 09:16;  Status DC


Fentanyl Citrate (Fentanyl 5ml Vial) 250 mcg 1X  ONCE IV  Last administered on 

5/8/20at 09:30;  Start 5/8/20 at 09:15;  Stop 5/8/20 at 09:16;  Status DC


Bumetanide (Bumex) 2 mg DAILY IV  Last administered on 5/18/20at 08:07;  Start 

5/8/20 at 10:00;  Stop 5/18/20 at 17:15;  Status DC


Potassium Chloride 75 meq/ Magnesium Sulfate 15 meq/ Multivitamins 10 

ml/Chromium/ Copper/Manganese/ Seleni/Zn 0.5 ml/ Insulin Human Regular 15 unit/ 

Total Parenteral Nutrition/Amino Acids/Dextrose/ Fat Emulsion Intravenous 1,920 

ml @  80 mls/hr TPN  CONT IV  Last administered on 5/8/20at 21:59;  Start 5/8/20

at 22:00;  Stop 5/9/20 at 21:59;  Status DC


Metoclopramide HCl (Reglan Vial) 10 mg PRN Q3HRS  PRN IVP NAUSEA/VOMITING-3rd 

choice Last administered on 5/14/20at 04:25;  Start 5/9/20 at 16:45


Potassium Chloride 75 meq/ Magnesium Sulfate 15 meq/ Multivitamins 10 

ml/Chromium/ Copper/Manganese/ Seleni/Zn 0.5 ml/ Insulin Human Regular 15 unit/ 

Total Parenteral Nutrition/Amino Acids/Dextrose/ Fat Emulsion Intravenous 1,920 

ml @  80 mls/hr TPN  CONT IV  Last administered on 5/9/20at 22:41;  Start 5/9/20

at 22:00;  Stop 5/10/20 at 21:59;  Status DC


Magnesium Sulfate 50 ml @ 25 mls/hr 1X  ONCE IV  Last administered on 5/10/20at 

10:44;  Start 5/10/20 at 09:00;  Stop 5/10/20 at 10:59;  Status DC


Potassium Chloride/Water 100 ml @  100 mls/hr 1X  ONCE IV  Last administered on 

5/10/20at 09:37;  Start 5/10/20 at 09:00;  Stop 5/10/20 at 09:59;  Status DC


Duloxetine HCl (Cymbalta) 30 mg DAILY PO  Last administered on 5/11/20at 09:48; 

Start 5/10/20 at 14:00;  Stop 5/13/20 at 10:25;  Status DC


Potassium Chloride 80 meq/ Magnesium Sulfate 20 meq/ Multivitamins 10 

ml/Chromium/ Copper/Manganese/ Seleni/Zn 0.5 ml/ Insulin Human Regular 15 unit/ 

Total Parenteral Nutrition/Amino Acids/Dextrose/ Fat Emulsion Intravenous 1,920 

ml @  80 mls/hr TPN  CONT IV  Last administered on 5/10/20at 21:42;  Start 

5/10/20 at 22:00;  Stop 5/11/20 at 21:59;  Status DC


Potassium Chloride 80 meq/ Magnesium Sulfate 20 meq/ Multivitamins 10 

ml/Chromium/ Copper/Manganese/ Seleni/Zn 0.5 ml/ Insulin Human Regular 15 unit/ 

Total Parenteral Nutrition/Amino Acids/Dextrose/ Fat Emulsion Intravenous 1,920 

ml @  80 mls/hr TPN  CONT IV  Last administered on 5/11/20at 22:20;  Start 

5/11/20 at 22:00;  Stop 5/12/20 at 21:59;  Status DC


Lidocaine HCl (Buffered Lidocaine 1%) 3 ml STK-MED ONCE .ROUTE ;  Start 5/12/20 

at 09:54;  Stop 5/12/20 at 09:55;  Status DC


Hydromorphone HCl (Dilaudid Standard PCA) 12 mg STK-MED ONCE IV ;  Start 5/1/20 

at 15:50;  Stop 5/12/20 at 11:24;  Status DC


Potassium Chloride 80 meq/ Magnesium Sulfate 20 meq/ Multivitamins 10 

ml/Chromium/ Copper/Manganese/ Seleni/Zn 0.5 ml/ Insulin Human Regular 15 unit/ 

Total Parenteral Nutrition/Amino Acids/Dextrose/ Fat Emulsion Intravenous 1,920 

ml @  80 mls/hr TPN  CONT IV  Last administered on 5/12/20at 21:40;  Start 

5/12/20 at 22:00;  Stop 5/13/20 at 21:59;  Status DC


Lidocaine HCl (Buffered Lidocaine 1%) 6 ml 1X  ONCE INJ  Last administered on 

5/12/20at 14:15;  Start 5/12/20 at 14:15;  Stop 5/12/20 at 14:16;  Status DC


Potassium Chloride 80 meq/ Magnesium Sulfate 20 meq/ Multivitamins 10 

ml/Chromium/ Copper/Manganese/ Seleni/Zn 1 ml/ Insulin Human Regular 15 unit/ 

Total Parenteral Nutrition/Amino Acids/Dextrose/ Fat Emulsion Intravenous 1,920 

ml @  80 mls/hr TPN  CONT IV  Last administered on 5/13/20at 22:04;  Start 

5/13/20 at 22:00;  Stop 5/14/20 at 21:59;  Status DC


Potassium Chloride/Water 100 ml @  100 mls/hr 1X  ONCE IV  Last administered on 

5/14/20at 11:34;  Start 5/14/20 at 11:00;  Stop 5/14/20 at 11:59;  Status DC


Potassium Chloride 90 meq/ Magnesium Sulfate 20 meq/ Multivitamins 10 

ml/Chromium/ Copper/Manganese/ Seleni/Zn 1 ml/ Insulin Human Regular 15 unit/ 

Total Parenteral Nutrition/Amino Acids/Dextrose/ Fat Emulsion Intravenous 1,920 

ml @  80 mls/hr TPN  CONT IV  Last administered on 5/14/20at 22:57;  Start 

5/14/20 at 22:00;  Stop 5/15/20 at 21:59;  Status DC


Potassium Chloride 90 meq/ Magnesium Sulfate 20 meq/ Multivitamins 10 ml/

Chromium/ Copper/Manganese/ Seleni/Zn 1 ml/ Insulin Human Regular 15 unit/ Total

Parenteral Nutrition/Amino Acids/Dextrose/ Fat Emulsion Intravenous 1,920 ml @  

80 mls/hr TPN  CONT IV  Last administered on 5/15/20at 22:48;  Start 5/15/20 at 

22:00;  Stop 5/16/20 at 21:59;  Status DC


Potassium Chloride 90 meq/ Magnesium Sulfate 20 meq/ Multivitamins 10 

ml/Chromium/ Copper/Manganese/ Seleni/Zn 1 ml/ Insulin Human Regular 15 unit/ 

Total Parenteral Nutrition/Amino Acids/Dextrose/ Fat Emulsion Intravenous 1,890 

ml @  78.75 mls/ hr TPN  CONT IV  Last administered on 5/16/20at 22:15;  Start 

5/16/20 at 22:00;  Stop 5/17/20 at 21:59;  Status DC


Linezolid/Dextrose 300 ml @  300 mls/hr Q12HR IV  Last administered on 5/19/20at

21:08;  Start 5/17/20 at 09:00;  Stop 5/20/20 at 08:11;  Status DC


Daptomycin 450 mg/ Sodium Chloride 50 ml @  100 mls/hr Q24H IV  Last 

administered on 5/20/20at 09:25;  Start 5/17/20 at 09:00;  Stop 5/21/20 at 

08:30;  Status DC


Potassium Chloride 90 meq/ Magnesium Sulfate 20 meq/ Multivitamins 10 m

l/Chromium/ Copper/Manganese/ Seleni/Zn 1 ml/ Insulin Human Regular 15 unit/ 

Total Parenteral Nutrition/Amino Acids/Dextrose/ Fat Emulsion Intravenous 1,890 

ml @  78.75 mls/ hr TPN  CONT IV  Last administered on 5/17/20at 21:34;  Start 

5/17/20 at 22:00;  Stop 5/18/20 at 21:59;  Status DC


Lorazepam (Ativan Inj) 2 mg STK-MED ONCE .ROUTE ;  Start 5/17/20 at 14:58;  Stop

5/17/20 at 14:58;  Status DC


Metoprolol Tartrate (Lopressor Vial) 5 mg 1X  ONCE IVP  Last administered on 

5/17/20at 15:31;  Start 5/17/20 at 15:15;  Stop 5/17/20 at 15:16;  Status DC


Lorazepam (Ativan Inj) 2 mg 1X  ONCE IVP  Last administered on 5/17/20at 15:30; 

Start 5/17/20 at 15:15;  Stop 5/17/20 at 15:16;  Status DC


Enoxaparin Sodium (Lovenox 40mg Syringe) 40 mg Q24H SQ  Last administered on 

6/5/20at 17:44;  Start 5/17/20 at 17:00;  Stop 6/7/20 at 06:50;  Status DC


Lorazepam (Ativan Inj) 1 mg PRN Q4HRS  PRN IVP ANXIETY / AGITATION MILD-MOD Last

administered on 5/31/20at 15:55;  Start 5/17/20 at 19:15;  Stop 6/2/20 at 11:45;

 Status DC


Lorazepam (Ativan Inj) 2 mg PRN Q4HRS  PRN IVP ANXIETY / AGITATION SEVERE Last 

administered on 6/1/20at 07:55;  Start 5/17/20 at 19:15;  Stop 6/2/20 at 11:45; 

Status DC


Fentanyl Citrate (Fentanyl 2ml Vial) 50 mcg PRN Q4HRS  PRN IVP SEVERE PAIN Last 

administered on 6/9/20at 04:40;  Start 5/18/20 at 13:15


Fentanyl Citrate (Fentanyl 2ml Vial) 25 mcg PRN Q4HRS  PRN IVP MODERATE PAIN 

Last administered on 6/11/20at 03:19;  Start 5/18/20 at 13:15


Potassium Chloride 90 meq/ Magnesium Sulfate 20 meq/ Multivitamins 10 ml/

Chromium/ Copper/Manganese/ Seleni/Zn 1 ml/ Insulin Human Regular 15 unit/ Total

Parenteral Nutrition/Amino Acids/Dextrose/ Fat Emulsion Intravenous 1,890 ml @  

78.75 mls/ hr TPN  CONT IV  Last administered on 5/18/20at 22:18;  Start 5/18/20

at 22:00;  Stop 5/19/20 at 21:59;  Status DC


Furosemide (Lasix) 40 mg 1X  ONCE IVP  Last administered on 5/18/20at 21:51;  

Start 5/18/20 at 21:45;  Stop 5/18/20 at 21:48;  Status DC


Albumin Human 100 ml @  100 mls/hr 1X PRN  PRN IV SEE COMMENTS;  Start 5/19/20 

at 01:30


Furosemide (Lasix) 40 mg [id number] IVP  Last administered on 6/3/20at 08:04;  Start 

5/19/20 at 14:00;  Stop 6/3/20 at 13:07;  Status DC


Potassium Chloride 90 meq/ Magnesium Sulfate 20 meq/ Multivitamins 10 

ml/Chromium/ Copper/Manganese/ Seleni/Zn 1 ml/ Insulin Human Regular 15 unit/ 

Total Parenteral Nutrition/Amino Acids/Dextrose/ Fat Emulsion Intravenous 1,800 

ml @  75 mls/hr TPN  CONT IV  Last administered on 5/19/20at 22:31;  Start 

5/19/20 at 22:00;  Stop 5/20/20 at 21:59;  Status DC


Potassium Chloride 90 meq/ Magnesium Sulfate 20 meq/ Multivitamins 10 

ml/Chromium/ Copper/Manganese/ Seleni/Zn 1 ml/ Insulin Human Regular 15 unit/ 

Total Parenteral Nutrition/Amino Acids/Dextrose/ Fat Emulsion Intravenous 1,800 

ml @  75 mls/hr TPN  CONT IV  Last administered on 5/20/20at 22:28;  Start 

5/20/20 at 22:00;  Stop 5/21/20 at 21:59;  Status DC


Potassium Chloride 110 meq/ Magnesium Sulfate 20 meq/ Multivitamins 10 

ml/Chromium/ Copper/Manganese/ Seleni/Zn 1 ml/ Insulin Human Regular 15 unit/ 

Total Parenteral Nutrition/Amino Acids/Dextrose/ Fat Emulsion Intravenous 1,800 

ml @  75 mls/hr TPN  CONT IV  Last administered on 5/21/20at 22:01;  Start 5/21 /20 at 22:00;  Stop 5/22/20 at 21:59;  Status DC


Saliva Substitute (Biotene Moisturizing Mouth) 2 spray PRN Q15MIN  PRN PO DRY M

OUTH;  Start 5/21/20 at 11:00


Potassium Chloride 110 meq/ Magnesium Sulfate 20 meq/ Multivitamins 10 

ml/Chromium/ Copper/Manganese/ Seleni/Zn 1 ml/ Insulin Human Regular 15 unit/ 

Total Parenteral Nutrition/Amino Acids/Dextrose/ Fat Emulsion Intravenous 1,800 

ml @  75 mls/hr TPN  CONT IV  Last administered on 5/22/20at 22:21;  Start 

5/22/20 at 22:00;  Stop 5/23/20 at 21:59;  Status DC


Potassium Chloride 110 meq/ Magnesium Sulfate 20 meq/ Multivitamins 10 

ml/Chromium/ Copper/Manganese/ Seleni/Zn 1 ml/ Insulin Human Regular 15 unit/ 

Total Parenteral Nutrition/Amino Acids/Dextrose/ Fat Emulsion Intravenous 1,800 

ml @  75 mls/hr TPN  CONT IV  Last administered on 5/23/20at 22:04;  Start 

5/23/20 at 22:00;  Stop 5/24/20 at 21:59;  Status DC


Potassium Chloride 110 meq/ Magnesium Sulfate 20 meq/ Multivitamins 10 

ml/Chromium/ Copper/Manganese/ Seleni/Zn 1 ml/ Insulin Human Regular 15 unit/ 

Total Parenteral Nutrition/Amino Acids/Dextrose/ Fat Emulsion Intravenous 1,800 

ml @  75 mls/hr TPN  CONT IV  Last administered on 5/24/20at 22:48;  Start 

5/24/20 at 22:00;  Stop 5/25/20 at 21:59;  Status DC


Potassium Chloride 70 meq/ Magnesium Sulfate 20 meq/ Multivitamins 10 

ml/Chromium/ Copper/Manganese/ Seleni/Zn 1 ml/ Insulin Human Regular 15 unit/ 

Total Parenteral Nutrition/Amino Acids/Dextrose/ Fat Emulsion Intravenous 1,800 

ml @  75 mls/hr TPN  CONT IV  Last administered on 5/25/20at 21:39;  Start 5/25 /20 at 22:00;  Stop 5/26/20 at 21:59;  Status DC


Meropenem 500 mg/ Sodium Chloride 50 ml @  100 mls/hr Q6HRS IV  Last administer

ed on 5/27/20at 06:02;  Start 5/25/20 at 18:00;  Stop 5/27/20 at 09:59;  Status 

DC


Barium Sulfate (Varibar Thin Liquid Apple) 148 gm 1X  ONCE PO ;  Start 5/26/20 

at 11:45;  Stop 5/26/20 at 11:49;  Status DC


Potassium Chloride 70 meq/ Magnesium Sulfate 20 meq/ Multivitamins 10 

ml/Chromium/ Copper/Manganese/ Seleni/Zn 1 ml/ Insulin Human Regular 15 unit/ 

Total Parenteral Nutrition/Amino Acids/Dextrose/ Fat Emulsion Intravenous 1,800 

ml @  75 mls/hr TPN  CONT IV  Last administered on 5/26/20at 22:27;  Start 5/ 26/20 at 22:00;  Stop 5/27/20 at 21:59;  Status DC


Piperacillin Sod/ Tazobactam Sod 3.375 gm/Sodium Chloride 50 ml @  100 mls/hr 

Q6HRS IV  Last administered on 6/4/20at 06:10;  Start 5/27/20 at 12:00;  Stop 

6/4/20 at 07:26;  Status DC


Potassium Chloride 70 meq/ Magnesium Sulfate 20 meq/ Multivitamins 10 

ml/Chromium/ Copper/Manganese/ Seleni/Zn 1 ml/ Insulin Human Regular 15 unit/ T

otal Parenteral Nutrition/Amino Acids/Dextrose/ Fat Emulsion Intravenous 1,800 

ml @  75 mls/hr TPN  CONT IV  Last administered on 5/27/20at 22:03;  Start 

5/27/20 at 22:00;  Stop 5/28/20 at 21:59;  Status DC


Potassium Chloride 70 meq/ Magnesium Sulfate 20 meq/ Multivitamins 10 

ml/Chromium/ Copper/Manganese/ Seleni/Zn 1 ml/ Insulin Human Regular 15 unit/ 

Total Parenteral Nutrition/Amino Acids/Dextrose/ Fat Emulsion Intravenous 1,800 

ml @  75 mls/hr TPN  CONT IV  Last administered on 5/28/20at 22:33;  Start 5/ 28/20 at 22:00;  Stop 5/29/20 at 21:59;  Status DC


Potassium Chloride 70 meq/ Magnesium Sulfate 20 meq/ Multivitamins 10 ml/

mium/ Copper/Manganese/ Seleni/Zn 1 ml/ Insulin Human Regular 15 unit/ Total 

Parenteral Nutrition/Amino Acids/Dextrose/ Fat Emulsion Intravenous 1,800 ml @  

75 mls/hr TPN  CONT IV  Last administered on 5/29/20at 23:13;  Start 5/29/20 at 

22:00;  Stop 5/30/20 at 21:59;  Status DC


Potassium Chloride 80 meq/ Magnesium Sulfate 20 meq/ Multivitamins 10 

ml/Chromium/ Copper/Manganese/ Seleni/Zn 1 ml/ Insulin Human Regular 15 unit/ 

Total Parenteral Nutrition/Amino Acids/Dextrose/ Fat Emulsion Intravenous 1,800 

ml @  75 mls/hr TPN  CONT IV  Last administered on 5/30/20at 22:30;  Start 

5/30/20 at 22:00;  Stop 5/31/20 at 21:59;  Status DC


Potassium Chloride 80 meq/ Magnesium Sulfate 20 meq/ Multivitamins 10 

ml/Chromium/ Copper/Manganese/ Seleni/Zn 1 ml/ Insulin Human Regular 15 unit/ 

Total Parenteral Nutrition/Amino Acids/Dextrose/ Fat Emulsion Intravenous 1,800 

ml @  75 mls/hr TPN  CONT IV  Last administered on 5/31/20at 21:54;  Start 

5/31/20 at 22:00;  Stop 6/1/20 at 21:59;  Status DC


Potassium Chloride/Water 100 ml @  100 mls/hr 1X  ONCE IV  Last administered on 

6/1/20at 10:15;  Start 6/1/20 at 10:00;  Stop 6/1/20 at 10:59;  Status DC


Potassium Chloride 90 meq/ Magnesium Sulfate 20 meq/ Multivitamins 10 

ml/Chromium/ Copper/Manganese/ Seleni/Zn 1 ml/ Insulin Human Regular 20 unit/ 

Total Parenteral Nutrition/Amino Acids/Dextrose/ Fat Emulsion Intravenous 1,800 

ml @  75 mls/hr TPN  CONT IV  Last administered on 6/1/20at 22:28;  Start 6/1/20

at 22:00;  Stop 6/2/20 at 21:59;  Status DC


Potassium Chloride 90 meq/ Magnesium Sulfate 20 meq/ Multivitamins 10 

ml/Chromium/ Copper/Manganese/ Seleni/Zn 1 ml/ Insulin Human Regular 20 unit/ 

Total Parenteral Nutrition/Amino Acids/Dextrose/ Fat Emulsion Intravenous 1,800 

ml @  75 mls/hr TPN  CONT IV  Last administered on 6/2/20at 22:08;  Start 6/2/20

at 22:00;  Stop 6/3/20 at 21:59;  Status DC


Lorazepam (Ativan Inj) 0.25 mg PRN Q4HRS  PRN IVP ANXIETY / AGITATION Last 

administered on 6/11/20at 05:53;  Start 6/3/20 at 07:30


Potassium Chloride 90 meq/ Magnesium Sulfate 20 meq/ Multivitamins 10 

ml/Chromium/ Copper/Manganese/ Seleni/Zn 1 ml/ Insulin Human Regular 20 unit/ 

Total Parenteral Nutrition/Amino Acids/Dextrose/ Fat Emulsion Intravenous 1,800 

ml @  75 mls/hr TPN  CONT IV  Last administered on 6/3/20at 23:13;  Start 6/3/20

at 22:00;  Stop 6/4/20 at 21:59;  Status DC


Furosemide (Lasix) 40 mg DAILY IVP  Last administered on 6/5/20at 11:14;  Start 

6/3/20 at 13:30;  Stop 6/7/20 at 09:12;  Status DC


Fluoxetine HCl (PROzac) 20 mg QHS PEG  Last administered on 6/10/20at 21:26;  

Start 6/4/20 at 21:00


Fentanyl (Duragesic 50mcg/ Hr Patch) 1 patch Q72H TD  Last administered on 

6/4/20at 21:22;  Start 6/4/20 at 21:00


Potassium Chloride 40 meq/ Potassium Acetate 60 meq/Magnesium Sulfate 10 meq/ 

Multivitamins 10 ml/Chromium/ Copper/Manganese/ Seleni/Zn 1 ml/ Insulin Human 

Regular 20 unit/ Total Parenteral Nutrition/Amino Acids/Dextrose/ Fat Emulsion 

Intravenous 1,800 ml @  75 mls/hr TPN  CONT IV  Last administered on 6/5/20at 

00:03;  Start 6/4/20 at 22:00;  Stop 6/5/20 at 21:59;  Status DC


Potassium Acetate 80 meq/Magnesium Sulfate 5 meq/ Multivitamins 10 ml/Chromium/ 

Copper/Manganese/ Seleni/Zn 1 ml/ Insulin Human Regular 20 unit/ Total 

Parenteral Nutrition/Amino Acids/Dextrose/ Fat Emulsion Intravenous 1,920 ml @  

80 mls/hr TPN  CONT IV  Last administered on 6/5/20at 21:59;  Start 6/5/20 at 

22:00;  Stop 6/6/20 at 21:59;  Status DC


Potassium Acetate 60 meq/Magnesium Sulfate 5 meq/ Multivitamins 10 ml/Chromium/ 

Copper/Manganese/ Seleni/Zn 1 ml/ Insulin Human Regular 30 unit/ Total 

Parenteral Nutrition/Amino Acids/Dextrose/ Fat Emulsion Intravenous 1,920 ml @  

80 mls/hr TPN  CONT IV  Last administered on 6/6/20at 21:54;  Start 6/6/20 at 

22:00;  Stop 6/7/20 at 21:59;  Status DC


Norepinephrine Bitartrate 8 mg/ Dextrose 258 ml @  13.332 mls/ hr CONT  PRN IV 

PER PROTOCOL Last administered on 6/7/20at 21:46;  Start 6/7/20 at 06:30


Albumin Human 500 ml @  125 mls/hr 1X  ONCE IV  Last administered on 6/7/20at 

08:10;  Start 6/7/20 at 08:15;  Stop 6/7/20 at 12:14;  Status DC


Potassium Acetate 40 meq/Magnesium Sulfate 5 meq/ Multivitamins 10 ml/Chromium/ 

Copper/Manganese/ Seleni/Zn 1 ml/ Insulin Human Regular 30 unit/ Total 

Parenteral Nutrition/Amino Acids/Dextrose/ Fat Emulsion Intravenous 1,920 ml @  

80 mls/hr TPN  CONT IV  Last administered on 6/7/20at 22:23;  Start 6/7/20 at 

22:00;  Stop 6/8/20 at 21:59;  Status DC


Meropenem 1 gm/ Sodium Chloride 100 ml @  200 mls/hr Q8HRS IV ;  Start 6/7/20 at

14:00;  Status Cancel


Meropenem 1 gm/ Sodium Chloride 100 ml @  200 mls/hr Q8HRS IV  Last administered

on 6/7/20at 11:04;  Start 6/7/20 at 10:00;  Stop 6/7/20 at 13:00;  Status DC


Meropenem 1 gm/ Sodium Chloride 100 ml @  200 mls/hr Q12HR IV  Last administered

on 6/10/20at 21:26;  Start 6/7/20 at 21:00


Sodium Chloride 1,000 ml @  1,000 mls/hr 1X  ONCE IV  Last administered on 6/ 7/20at 11:06;  Start 6/7/20 at 10:45;  Stop 6/7/20 at 11:44;  Status DC


Micafungin Sodium 100 mg/Dextrose 100 ml @  100 mls/hr Q24H IV  Last 

administered on 6/10/20at 12:09;  Start 6/7/20 at 11:00


Daptomycin 410 mg/ Sodium Chloride 50 ml @  100 mls/hr Q24H IV  Last 

administered on 6/9/20at 13:33;  Start 6/7/20 at 14:00;  Stop 6/10/20 at 08:30; 

Status DC


Midazolam HCl (Versed) 2 mg STK-MED ONCE .ROUTE ;  Start 6/7/20 at 14:47;  Stop 

6/7/20 at 14:48;  Status DC


Fentanyl Citrate (Fentanyl 2ml Vial) 100 mcg STK-MED ONCE .ROUTE ;  Start 6/7/20

at 14:47;  Stop 6/7/20 at 14:48;  Status DC


Flumazenil (Romazicon) 0.5 mg STK-MED ONCE IV ;  Start 6/7/20 at 14:48;  Stop 

6/7/20 at 14:48;  Status DC


Naloxone HCl (Narcan) 0.4 mg STK-MED ONCE .ROUTE ;  Start 6/7/20 at 14:48;  Stop

6/7/20 at 14:48;  Status DC


Lidocaine HCl (Lidocaine 1% 20ml Vial) 20 ml STK-MED ONCE .ROUTE ;  Start 6/7/20

at 14:48;  Stop 6/7/20 at 14:48;  Status DC


Midazolam HCl (Versed) 2 mg 1X  ONCE IV  Last administered on 6/7/20at 15:28;  

Start 6/7/20 at 15:00;  Stop 6/7/20 at 15:01;  Status DC


Fentanyl Citrate (Fentanyl 2ml Vial) 100 mcg 1X  ONCE IV  Last administered on 

6/7/20at 15:28;  Start 6/7/20 at 15:00;  Stop 6/7/20 at 15:01;  Status DC


Lidocaine HCl (Lidocaine 1% 20ml Vial) 20 ml 1X  ONCE INJ  Last administered on 

6/7/20at 15:30;  Start 6/7/20 at 15:00;  Stop 6/7/20 at 15:01;  Status DC


Sodium Chloride 1,000 ml @  100 mls/hr Q10H IV  Last administered on 6/11/20at 

02:13;  Start 6/7/20 at 20:00


Sodium Bicarbonate (Sodium Bicarb Adult 8.4% Syr) 50 meq 1X  ONCE IV  Last 

administered on 6/7/20at 21:47;  Start 6/7/20 at 22:00;  Stop 6/7/20 at 22:01;  

Status DC


Potassium Acetate 40 meq/Magnesium Sulfate 5 meq/ Multivitamins 10 ml/Chromium/ 

Copper/Manganese/ Seleni/Zn 1 ml/ Insulin Human Regular 30 unit/ Total 

Parenteral Nutrition/Amino Acids/Dextrose/ Fat Emulsion Intravenous 1,920 ml @  

80 mls/hr TPN  CONT IV  Last administered on 6/8/20at 22:28;  Start 6/8/20 at 

22:00;  Stop 6/9/20 at 21:59;  Status DC


Sodium Chloride 500 ml @  500 mls/hr 1X  ONCE IV  Last administered on 6/9/20at 

06:39;  Start 6/9/20 at 06:45;  Stop 6/9/20 at 07:44;  Status DC


Potassium Acetate 40 meq/Magnesium Sulfate 5 meq/ Multivitamins 10 ml/Chromium/ 

Copper/Manganese/ Seleni/Zn 1 ml/ Insulin Human Regular 30 unit/ Total 

Parenteral Nutrition/Amino Acids/Dextrose/ Fat Emulsion Intravenous 1,920 ml @  

80 mls/hr TPN  CONT IV  Last administered on 6/9/20at 22:03;  Start 6/9/20 at 

22:00;  Stop 6/10/20 at 21:59;  Status DC


Metoprolol Tartrate (Lopressor Vial) 5 mg PRN Q6HRS  PRN IVP HYPERTENSION;  

Start 6/10/20 at 09:00


Potassium Acetate 40 meq/Magnesium Sulfate 5 meq/ Multivitamins 10 ml/Chromium/ 

Copper/Manganese/ Seleni/Zn 1 ml/ Insulin Human Regular 30 unit/ Total 

Parenteral Nutrition/Amino Acids/Dextrose/ Fat Emulsion Intravenous 1,920 ml @  

80 mls/hr TPN  CONT IV  Last administered on 6/10/20at 21:26;  Start 6/10/20 at 

22:00;  Stop 6/11/20 at 21:59





Active Scripts


Active


Reported


Bisoprolol Fumarate 5 Mg Tablet 10 Mg PO DAILY


Vitals/I & O





Vital Sign - Last 24 Hours








 6/10/20 6/10/20 6/10/20 6/10/20





 08:57 09:00 10:00 10:23


 


Pulse  128 127 


 


Resp  25 25 18


 


B/P (MAP)  138/68 (91) 129/64 (85) 


 


Pulse Ox 99 100 100 98


 


O2 Delivery Tracheal Collar Tracheal Collar Tracheal Collar Tracheal Collar


 


O2 Flow Rate 10.0 10.0 10.0 





 6/10/20 6/10/20 6/10/20 6/10/20





 10:54 11:00 11:40 12:00


 


Temp   98.4 98.2





   98.4 98.2


 


Pulse  127 130 126


 


Resp 25 25 40 40


 


B/P (MAP)  129/65 (86) 134/72 134/74 (94)


 


Pulse Ox 98 100  100


 


O2 Delivery Tracheal Collar Tracheal Collar  Tracheal Collar


 


O2 Flow Rate  10.0  10.0


 


    





    





 6/10/20 6/10/20 6/10/20 6/10/20





 12:00 13:00 13:24 14:00


 


Temp   98.6 





   98.6 


 


Pulse  124 124 122


 


Resp  40 25 40


 


B/P (MAP)  139/76 (97) 141/76 148/78 (101)


 


Pulse Ox  100  100


 


O2 Delivery Trach Collar Tracheal Collar  Tracheal Collar


 


O2 Flow Rate 10.0 10.0  10.0


 


    





    





 6/10/20 6/10/20 6/10/20 6/10/20





 15:00 16:00 16:00 16:25


 


Temp  98.2  





  98.2  


 


Pulse 126 132  


 


Resp 33 40  32


 


B/P (MAP) 138/78 (98) 158/82 (107)  


 


Pulse Ox 100 100  100


 


O2 Delivery Tracheal Collar Tracheal Collar Trach Collar 


 


O2 Flow Rate 10.0 10.0 10.0 


 


    





    





 6/10/20 6/10/20 6/10/20 6/10/20





 16:55 17:00 18:00 19:00


 


Pulse  120 142 134


 


Resp  30 50 45


 


B/P (MAP)  153/74 (100) 207/91 (129) 130/70 (90)


 


Pulse Ox 100 100 100 100


 


O2 Delivery  Tracheal Collar Tracheal Collar Tracheal Collar


 


O2 Flow Rate 10.0 10.0 10.0 10.0





 6/10/20 6/10/20 6/10/20 6/10/20





 20:00 20:00 21:00 22:00


 


Temp  100.3  





  100.3  


 


Pulse  133 134 136


 


Resp  46 44 48


 


B/P (MAP)  129/72 (91) 152/78 (102) 127/75 (92)


 


Pulse Ox  99 99 99


 


O2 Delivery Trach Collar Tracheal Collar Tracheal Collar Tracheal Collar


 


O2 Flow Rate 10.0 10.0 10.0 10.0


 


    





    





 6/10/20 6/11/20 6/11/20 6/11/20





 23:00 00:00 00:00 01:00


 


Temp  98.9  





  98.9  


 


Pulse 134 130  120


 


Resp 38 42  29


 


B/P (MAP) 129/64 (85) 128/68 (88)  104/54 (71)


 


Pulse Ox 98 99  99


 


O2 Delivery Tracheal Collar Tracheal Collar Trach Collar Tracheal Collar


 


O2 Flow Rate 10.0 10.0 10.0 10.0


 


    





    





 6/11/20 6/11/20 6/11/20 6/11/20





 02:00 03:00 04:00 04:00


 


Temp    98.1





    98.1


 


Pulse 118 120  124


 


Resp 30 38  38


 


B/P (MAP) 119/58 (78) 149/68 (95)  137/62 (87)


 


Pulse Ox 97 99  98


 


O2 Delivery Tracheal Collar Tracheal Collar Trach Collar Tracheal Collar


 


O2 Flow Rate 10.0 10.0 10.0 10.0


 


    





    





 6/11/20 6/11/20 6/11/20 6/11/20





 05:00 06:00 07:00 08:00


 


Temp    98.6





    98.6


 


Pulse 123 138 140 138


 


Resp 36 58 42 48


 


B/P (MAP) 145/65 (91) 192/86 (121) 168/78 (108) 155/80 (105)


 


Pulse Ox 99 85 96 97


 


O2 Delivery Tracheal Collar Tracheal Collar Tracheal Collar Tracheal Collar


 


O2 Flow Rate 10.0 10.0 10.0 10.0


 


    





    





 6/11/20 6/11/20  





 08:00 08:00  


 


B/P (MAP)    


 


O2 Delivery  Trach Collar  


 


O2 Flow Rate  10.0  














Intake and Output   


 


 6/10/20 6/10/20 6/11/20





 15:00 23:00 07:00


 


Intake Total 400 ml 100 ml 3526 ml


 


Output Total 620 ml 765 ml 1030 ml


 


Balance -220 ml -665 ml 2496 ml











Hemodynamically unstable?:  No


Is patient in severe pain?:  No


Is NPO status required?:  No











GAETANO GONCALVES MD        Jun 11, 2020 08:50

## 2020-06-11 NOTE — NUR
Pharmacy TPN Dosing Note



S: JESENIA BEAN is a 49 year old F Currently receiving Central Continuous TPN started 
03/18/20



B:Pertinent PMH: Necrotizing pancreatitis

Height: 5 feet, 8 inches

Weight: 80.0 kg



Current diet: NPO 



LABS:

Sodium:    147 

Potassium: 4.1 

Chloride:  115 

Calcium:   9.9 

Corrected Calcium: 12.06 

Magnesium: 1.8 

CO2:       22 

SCr:       0.9 

Glucose:   163-181 

Albumin:   1.3 

AST:       21 

ALT:       17 



TPN FORMULA:

TPN TYPE:  Central Continuous

AMINO ACIDS:         75 gm

DEXTROSE:            250 gm

LIPIDS:              20 gm



POTASSIUM ACETATE:   40 mEq

MAGNESIUM:           5 mEq

INSULIN:             30 units

MULTIPLE VITAMIN:    10 ml

TRACE ELEMENTS:      1 ml(s)



TPN PLAN: Cont same





R: Continue TPN 

Will monitor electrolytes, glucose, and tolerance to TPN.



 Maribell Vazquez RPH, 06/11/20 [id number]

## 2020-06-11 NOTE — PDOC
G I PROGRESS NOTE


Subjective


Sleeping, not awakened.


Objective


Others' notes reviewed.


Physical Exam


NO PE.


Review of Relevant


I have reviewed the following items josy (where applicable) has been applied.


Labs





Laboratory Tests








Test


 6/9/20


18:14 6/9/20


23:54 6/10/20


05:50 6/10/20


05:54


 


Glucose (Fingerstick)


 158 mg/dL


(70-99) 116 mg/dL


(70-99) 


 152 mg/dL


(70-99)


 


White Blood Count


 


 


 6.8 x10^3/uL


(4.0-11.0) 





 


Red Blood Count


 


 


 2.67 x10^6/uL


(3.50-5.40) 





 


Hemoglobin


 


 


 7.8 g/dL


(12.0-15.5) 





 


Hematocrit


 


 


 24.0 %


(36.0-47.0) 





 


Mean Corpuscular Volume   90 fL ()  


 


Mean Corpuscular Hemoglobin   29 pg (25-35)  


 


Mean Corpuscular Hemoglobin


Concent 


 


 33 g/dL


(31-37) 





 


Red Cell Distribution Width


 


 


 18.0 %


(11.5-14.5) 





 


Platelet Count


 


 


 279 x10^3/uL


(140-400) 





 


Neutrophils (%) (Auto)   71 % (31-73)  


 


Lymphocytes (%) (Auto)   20 % (24-48)  


 


Monocytes (%) (Auto)   7 % (0-9)  


 


Eosinophils (%) (Auto)   2 % (0-3)  


 


Basophils (%) (Auto)   0 % (0-3)  


 


Neutrophils # (Auto)


 


 


 4.8 x10^3/uL


(1.8-7.7) 





 


Lymphocytes # (Auto)


 


 


 1.3 x10^3/uL


(1.0-4.8) 





 


Monocytes # (Auto)


 


 


 0.5 x10^3/uL


(0.0-1.1) 





 


Eosinophils # (Auto)


 


 


 0.1 x10^3/uL


(0.0-0.7) 





 


Basophils # (Auto)


 


 


 0.0 x10^3/uL


(0.0-0.2) 





 


Sodium Level


 


 


 147 mmol/L


(136-145) 





 


Potassium Level


 


 


 3.9 mmol/L


(3.5-5.1) 





 


Chloride Level


 


 


 117 mmol/L


() 





 


Carbon Dioxide Level


 


 


 23 mmol/L


(21-32) 





 


Anion Gap   7 (6-14)  


 


Blood Urea Nitrogen


 


 


 38 mg/dL


(7-20) 





 


Creatinine


 


 


 1.0 mg/dL


(0.6-1.0) 





 


Estimated GFR


(Cockcroft-Gault) 


 


 58.9 


 





 


Glucose Level


 


 


 164 mg/dL


(70-99) 





 


Calcium Level


 


 


 10.0 mg/dL


(8.5-10.1) 





 


Test


 6/10/20


18:37 6/10/20


23:30 6/11/20


06:02 6/11/20


06:15


 


Glucose (Fingerstick)


 152 mg/dL


(70-99) 145 mg/dL


(70-99) 167 mg/dL


(70-99) 





 


White Blood Count


 


 


 


 9.8 x10^3/uL


(4.0-11.0)


 


Red Blood Count


 


 


 


 2.90 x10^6/uL


(3.50-5.40)


 


Hemoglobin


 


 


 


 8.6 g/dL


(12.0-15.5)


 


Hematocrit


 


 


 


 26.2 %


(36.0-47.0)


 


Mean Corpuscular Volume    91 fL () 


 


Mean Corpuscular Hemoglobin    30 pg (25-35) 


 


Mean Corpuscular Hemoglobin


Concent 


 


 


 33 g/dL


(31-37)


 


Red Cell Distribution Width


 


 


 


 17.4 %


(11.5-14.5)


 


Platelet Count


 


 


 


 335 x10^3/uL


(140-400)


 


Neutrophils (%) (Auto)    70 % (31-73) 


 


Lymphocytes (%) (Auto)    24 % (24-48) 


 


Monocytes (%) (Auto)    5 % (0-9) 


 


Eosinophils (%) (Auto)    1 % (0-3) 


 


Basophils (%) (Auto)    0 % (0-3) 


 


Neutrophils # (Auto)


 


 


 


 6.9 x10^3/uL


(1.8-7.7)


 


Lymphocytes # (Auto)


 


 


 


 2.4 x10^3/uL


(1.0-4.8)


 


Monocytes # (Auto)


 


 


 


 0.5 x10^3/uL


(0.0-1.1)


 


Eosinophils # (Auto)


 


 


 


 0.1 x10^3/uL


(0.0-0.7)


 


Basophils # (Auto)


 


 


 


 0.0 x10^3/uL


(0.0-0.2)


 


Sodium Level


 


 


 


 147 mmol/L


(136-145)


 


Potassium Level


 


 


 


 4.1 mmol/L


(3.5-5.1)


 


Chloride Level


 


 


 


 115 mmol/L


()


 


Carbon Dioxide Level


 


 


 


 22 mmol/L


(21-32)


 


Anion Gap    10 (6-14) 


 


Blood Urea Nitrogen


 


 


 


 32 mg/dL


(7-20)


 


Creatinine


 


 


 


 0.9 mg/dL


(0.6-1.0)


 


Estimated GFR


(Cockcroft-Gault) 


 


 


 66.5 





 


BUN/Creatinine Ratio    36 (6-20) 


 


Glucose Level


 


 


 


 181 mg/dL


(70-99)


 


Calcium Level


 


 


 


 9.9 mg/dL


(8.5-10.1)


 


Phosphorus Level


 


 


 


 4.0 mg/dL


(2.6-4.7)


 


Magnesium Level


 


 


 


 1.8 mg/dL


(1.8-2.4)


 


Total Bilirubin


 


 


 


 0.4 mg/dL


(0.2-1.0)


 


Aspartate Amino Transf


(AST/SGOT) 


 


 


 21 U/L (15-37) 





 


Alanine Aminotransferase


(ALT/SGPT) 


 


 


 17 U/L (14-59) 





 


Alkaline Phosphatase


 


 


 


 91 U/L


()


 


Total Protein


 


 


 


 5.2 g/dL


(6.4-8.2)


 


Albumin


 


 


 


 1.3 g/dL


(3.4-5.0)


 


Albumin/Globulin Ratio    0.3 (1.0-1.7) 


 


Triglycerides Level


 


 


 


 203 mg/dL


(0-150)


 


Test


 6/11/20


11:32 


 


 





 


Glucose (Fingerstick)


 163 mg/dL


(70-99) 


 


 











Laboratory Tests








Test


 6/10/20


18:37 6/10/20


23:30 6/11/20


06:02 6/11/20


06:15


 


Glucose (Fingerstick)


 152 mg/dL


(70-99) 145 mg/dL


(70-99) 167 mg/dL


(70-99) 





 


White Blood Count


 


 


 


 9.8 x10^3/uL


(4.0-11.0)


 


Red Blood Count


 


 


 


 2.90 x10^6/uL


(3.50-5.40)


 


Hemoglobin


 


 


 


 8.6 g/dL


(12.0-15.5)


 


Hematocrit


 


 


 


 26.2 %


(36.0-47.0)


 


Mean Corpuscular Volume    91 fL () 


 


Mean Corpuscular Hemoglobin    30 pg (25-35) 


 


Mean Corpuscular Hemoglobin


Concent 


 


 


 33 g/dL


(31-37)


 


Red Cell Distribution Width


 


 


 


 17.4 %


(11.5-14.5)


 


Platelet Count


 


 


 


 335 x10^3/uL


(140-400)


 


Neutrophils (%) (Auto)    70 % (31-73) 


 


Lymphocytes (%) (Auto)    24 % (24-48) 


 


Monocytes (%) (Auto)    5 % (0-9) 


 


Eosinophils (%) (Auto)    1 % (0-3) 


 


Basophils (%) (Auto)    0 % (0-3) 


 


Neutrophils # (Auto)


 


 


 


 6.9 x10^3/uL


(1.8-7.7)


 


Lymphocytes # (Auto)


 


 


 


 2.4 x10^3/uL


(1.0-4.8)


 


Monocytes # (Auto)


 


 


 


 0.5 x10^3/uL


(0.0-1.1)


 


Eosinophils # (Auto)


 


 


 


 0.1 x10^3/uL


(0.0-0.7)


 


Basophils # (Auto)


 


 


 


 0.0 x10^3/uL


(0.0-0.2)


 


Sodium Level


 


 


 


 147 mmol/L


(136-145)


 


Potassium Level


 


 


 


 4.1 mmol/L


(3.5-5.1)


 


Chloride Level


 


 


 


 115 mmol/L


()


 


Carbon Dioxide Level


 


 


 


 22 mmol/L


(21-32)


 


Anion Gap    10 (6-14) 


 


Blood Urea Nitrogen


 


 


 


 32 mg/dL


(7-20)


 


Creatinine


 


 


 


 0.9 mg/dL


(0.6-1.0)


 


Estimated GFR


(Cockcroft-Gault) 


 


 


 66.5 





 


BUN/Creatinine Ratio    36 (6-20) 


 


Glucose Level


 


 


 


 181 mg/dL


(70-99)


 


Calcium Level


 


 


 


 9.9 mg/dL


(8.5-10.1)


 


Phosphorus Level


 


 


 


 4.0 mg/dL


(2.6-4.7)


 


Magnesium Level


 


 


 


 1.8 mg/dL


(1.8-2.4)


 


Total Bilirubin


 


 


 


 0.4 mg/dL


(0.2-1.0)


 


Aspartate Amino Transf


(AST/SGOT) 


 


 


 21 U/L (15-37) 





 


Alanine Aminotransferase


(ALT/SGPT) 


 


 


 17 U/L (14-59) 





 


Alkaline Phosphatase


 


 


 


 91 U/L


()


 


Total Protein


 


 


 


 5.2 g/dL


(6.4-8.2)


 


Albumin


 


 


 


 1.3 g/dL


(3.4-5.0)


 


Albumin/Globulin Ratio    0.3 (1.0-1.7) 


 


Triglycerides Level


 


 


 


 203 mg/dL


(0-150)


 


Test


 6/11/20


11:32 


 


 





 


Glucose (Fingerstick)


 163 mg/dL


(70-99) 


 


 











Microbiology


6/7/20 Gram Stain - Final, Resulted


         


6/7/20 Aerobic and Anaerobic Culture - Preliminary, Resulted


         


6/7/20 Antimicrobic Susceptibility - Preliminary, Resulted


         


6/7/20 Blood Culture - Preliminary, Resulted


         NO GROWTH AFTER 3 DAYS


6/7/20 Urine Culture - Final, Complete


         


5/30/20 Gram Stain - Final, Complete


          


5/30/20 Aerobic Culture - Final, Complete


          


Pseudomonas from fluid collection.


Vitals/I & O





Vital Sign - Last 24 Hours








 6/10/20 6/10/20 6/10/20 6/10/20





 13:00 13:24 14:00 15:00


 


Temp  98.6  





  98.6  


 


Pulse 124 124 122 126


 


Resp 40 25 40 33


 


B/P (MAP) 139/76 (97) 141/76 148/78 (101) 138/78 (98)


 


Pulse Ox 100  100 100


 


O2 Delivery Tracheal Collar  Tracheal Collar Tracheal Collar


 


O2 Flow Rate 10.0  10.0 10.0


 


    





    





 6/10/20 6/10/20 6/10/20 6/10/20





 16:00 16:00 16:25 16:55


 


Temp 98.2   





 98.2   


 


Pulse 132   


 


Resp 40  32 


 


B/P (MAP) 158/82 (107)   


 


Pulse Ox 100  100 100


 


O2 Delivery Tracheal Collar Trach Collar  


 


O2 Flow Rate 10.0 10.0  10.0


 


    





    





 6/10/20 6/10/20 6/10/20 6/10/20





 17:00 18:00 19:00 20:00


 


Pulse 120 142 134 


 


Resp 30 50 45 


 


B/P (MAP) 153/74 (100) 207/91 (129) 130/70 (90) 


 


Pulse Ox 100 100 100 


 


O2 Delivery Tracheal Collar Tracheal Collar Tracheal Collar Trach Collar


 


O2 Flow Rate 10.0 10.0 10.0 10.0





 6/10/20 6/10/20 6/10/20 6/10/20





 20:00 21:00 22:00 23:00


 


Temp 100.3   





 100.3   


 


Pulse 133 134 136 134


 


Resp 46 44 48 38


 


B/P (MAP) 129/72 (91) 152/78 (102) 127/75 (92) 129/64 (85)


 


Pulse Ox 99 99 99 98


 


O2 Delivery Tracheal Collar Tracheal Collar Tracheal Collar Tracheal Collar


 


O2 Flow Rate 10.0 10.0 10.0 10.0


 


    





    





 6/11/20 6/11/20 6/11/20 6/11/20





 00:00 00:00 01:00 02:00


 


Temp 98.9   





 98.9   


 


Pulse 130  120 118


 


Resp 42  29 30


 


B/P (MAP) 128/68 (88)  104/54 (71) 119/58 (78)


 


Pulse Ox 99  99 97


 


O2 Delivery Tracheal Collar Trach Collar Tracheal Collar Tracheal Collar


 


O2 Flow Rate 10.0 10.0 10.0 10.0


 


    





    





 6/11/20 6/11/20 6/11/20 6/11/20





 03:00 04:00 04:00 05:00


 


Temp   98.1 





   98.1 


 


Pulse 120  124 123


 


Resp 38  38 36


 


B/P (MAP) 149/68 (95)  137/62 (87) 145/65 (91)


 


Pulse Ox 99  98 99


 


O2 Delivery Tracheal Collar Trach Collar Tracheal Collar Tracheal Collar


 


O2 Flow Rate 10.0 10.0 10.0 10.0


 


    





    





 6/11/20 6/11/20 6/11/20 6/11/20





 06:00 07:00 08:00 08:00


 


Temp   98.6 





   98.6 


 


Pulse 138 140 138 


 


Resp 58 42 48 


 


B/P (MAP) 192/86 (121) 168/78 (108) 155/80 (105) 


 


Pulse Ox 85 96 97 


 


O2 Delivery Tracheal Collar Tracheal Collar Tracheal Collar 


 


O2 Flow Rate 10.0 10.0 10.0 


 


    





    





 6/11/20 6/11/20 6/11/20 6/11/20





 08:00 09:00 10:00 10:03


 


Pulse  134 122 


 


Resp  42 40 44


 


B/P (MAP)  159/77 (104) 124/68 (86) 


 


Pulse Ox  96 98 95


 


O2 Delivery Trach Collar Tracheal Collar Tracheal Collar Tracheal Collar


 


O2 Flow Rate 10.0 10.0 10.0 10.0





 6/11/20 6/11/20 6/11/20 6/11/20





 10:03 10:33 11:00 11:45


 


Pulse 144  124 


 


Resp  40 38 


 


B/P (MAP) 198/82  131/61 (84) 


 


Pulse Ox  96 95 


 


O2 Delivery  Tracheal Collar Tracheal Collar Trach Collar


 


O2 Flow Rate  10.0 10.0 10.0





 6/11/20 6/11/20  





 12:00 12:00  


 


Temp  98.4  





  98.4  


 


Pulse  128  


 


Resp  40  


 


B/P (MAP)  149/79 (102)  


 


Pulse Ox  99  


 


O2 Delivery  Tracheal Collar  


 


O2 Flow Rate  10.0  














Intake and Output   


 


 6/10/20 6/10/20 6/11/20





 15:00 23:00 07:00


 


Intake Total 400 ml 100 ml 3526 ml


 


Output Total 620 ml 765 ml 1030 ml


 


Balance -220 ml -665 ml 2496 ml








Problem List


Problems


Medical Problems:


(1) Acute pancreatitis


Status: Acute  





(2) Cholelithiasis


Status: Acute  





Assessment


Severe gallstone pancreatitis, prolonged course.


Pseudomonas in fluid collection.


Plan of Care Note


Continue support.


Treatment of Pseudomonas per ID; will definitely require surgical drainage at 

some point.  Possible this is contaminant from skin?  In ICU a long time.





Justicifation of Admission Dx:


Justifications for Admission:


Justification of Admission Dx:  Yes











MARCO ANTONIO LONGO MD          Jun 11, 2020 12:47

## 2020-06-12 VITALS — SYSTOLIC BLOOD PRESSURE: 143 MMHG | DIASTOLIC BLOOD PRESSURE: 66 MMHG

## 2020-06-12 VITALS — SYSTOLIC BLOOD PRESSURE: 150 MMHG | DIASTOLIC BLOOD PRESSURE: 76 MMHG

## 2020-06-12 VITALS — SYSTOLIC BLOOD PRESSURE: 153 MMHG | DIASTOLIC BLOOD PRESSURE: 73 MMHG

## 2020-06-12 VITALS — SYSTOLIC BLOOD PRESSURE: 158 MMHG | DIASTOLIC BLOOD PRESSURE: 74 MMHG

## 2020-06-12 VITALS — SYSTOLIC BLOOD PRESSURE: 130 MMHG | DIASTOLIC BLOOD PRESSURE: 70 MMHG

## 2020-06-12 VITALS — DIASTOLIC BLOOD PRESSURE: 60 MMHG | SYSTOLIC BLOOD PRESSURE: 130 MMHG

## 2020-06-12 VITALS — SYSTOLIC BLOOD PRESSURE: 135 MMHG | DIASTOLIC BLOOD PRESSURE: 71 MMHG

## 2020-06-12 VITALS — DIASTOLIC BLOOD PRESSURE: 76 MMHG | SYSTOLIC BLOOD PRESSURE: 154 MMHG

## 2020-06-12 VITALS — DIASTOLIC BLOOD PRESSURE: 79 MMHG | SYSTOLIC BLOOD PRESSURE: 159 MMHG

## 2020-06-12 VITALS — SYSTOLIC BLOOD PRESSURE: 144 MMHG | DIASTOLIC BLOOD PRESSURE: 76 MMHG

## 2020-06-12 VITALS — SYSTOLIC BLOOD PRESSURE: 142 MMHG | DIASTOLIC BLOOD PRESSURE: 68 MMHG

## 2020-06-12 VITALS — SYSTOLIC BLOOD PRESSURE: 127 MMHG | DIASTOLIC BLOOD PRESSURE: 65 MMHG

## 2020-06-12 VITALS — DIASTOLIC BLOOD PRESSURE: 74 MMHG | SYSTOLIC BLOOD PRESSURE: 146 MMHG

## 2020-06-12 VITALS — SYSTOLIC BLOOD PRESSURE: 136 MMHG | DIASTOLIC BLOOD PRESSURE: 64 MMHG

## 2020-06-12 VITALS — SYSTOLIC BLOOD PRESSURE: 123 MMHG | DIASTOLIC BLOOD PRESSURE: 64 MMHG

## 2020-06-12 VITALS — DIASTOLIC BLOOD PRESSURE: 63 MMHG | SYSTOLIC BLOOD PRESSURE: 113 MMHG

## 2020-06-12 VITALS — SYSTOLIC BLOOD PRESSURE: 139 MMHG | DIASTOLIC BLOOD PRESSURE: 70 MMHG

## 2020-06-12 VITALS — SYSTOLIC BLOOD PRESSURE: 154 MMHG | DIASTOLIC BLOOD PRESSURE: 74 MMHG

## 2020-06-12 VITALS — DIASTOLIC BLOOD PRESSURE: 64 MMHG | SYSTOLIC BLOOD PRESSURE: 157 MMHG

## 2020-06-12 VITALS — DIASTOLIC BLOOD PRESSURE: 77 MMHG | SYSTOLIC BLOOD PRESSURE: 157 MMHG

## 2020-06-12 VITALS — DIASTOLIC BLOOD PRESSURE: 80 MMHG | SYSTOLIC BLOOD PRESSURE: 153 MMHG

## 2020-06-12 VITALS — DIASTOLIC BLOOD PRESSURE: 66 MMHG | SYSTOLIC BLOOD PRESSURE: 157 MMHG

## 2020-06-12 VITALS — SYSTOLIC BLOOD PRESSURE: 138 MMHG | DIASTOLIC BLOOD PRESSURE: 62 MMHG

## 2020-06-12 VITALS — DIASTOLIC BLOOD PRESSURE: 72 MMHG | SYSTOLIC BLOOD PRESSURE: 158 MMHG

## 2020-06-12 VITALS — DIASTOLIC BLOOD PRESSURE: 75 MMHG | SYSTOLIC BLOOD PRESSURE: 147 MMHG

## 2020-06-12 VITALS — SYSTOLIC BLOOD PRESSURE: 130 MMHG | DIASTOLIC BLOOD PRESSURE: 66 MMHG

## 2020-06-12 VITALS — DIASTOLIC BLOOD PRESSURE: 76 MMHG | SYSTOLIC BLOOD PRESSURE: 160 MMHG

## 2020-06-12 VITALS — SYSTOLIC BLOOD PRESSURE: 157 MMHG | DIASTOLIC BLOOD PRESSURE: 81 MMHG

## 2020-06-12 LAB
ALBUMIN SERPL-MCNC: 1.1 G/DL (ref 3.4–5)
ALBUMIN/GLOB SERPL: 0.3 {RATIO} (ref 1–1.7)
ALP SERPL-CCNC: 83 U/L (ref 46–116)
ALT SERPL-CCNC: 16 U/L (ref 14–59)
ANION GAP SERPL CALC-SCNC: 7 MMOL/L (ref 6–14)
AST SERPL-CCNC: 22 U/L (ref 15–37)
BASOPHILS # BLD AUTO: 0 X10^3/UL (ref 0–0.2)
BASOPHILS NFR BLD: 0 % (ref 0–3)
BILIRUB SERPL-MCNC: 0.4 MG/DL (ref 0.2–1)
BUN SERPL-MCNC: 29 MG/DL (ref 7–20)
BUN/CREAT SERPL: 36 (ref 6–20)
CALCIUM SERPL-MCNC: 9.8 MG/DL (ref 8.5–10.1)
CHLORIDE SERPL-SCNC: 114 MMOL/L (ref 98–107)
CO2 SERPL-SCNC: 25 MMOL/L (ref 21–32)
CREAT SERPL-MCNC: 0.8 MG/DL (ref 0.6–1)
EOSINOPHIL NFR BLD: 0.1 X10^3/UL (ref 0–0.7)
EOSINOPHIL NFR BLD: 1 % (ref 0–3)
ERYTHROCYTE [DISTWIDTH] IN BLOOD BY AUTOMATED COUNT: 16.6 % (ref 11.5–14.5)
ERYTHROCYTE [DISTWIDTH] IN BLOOD BY AUTOMATED COUNT: 19.7 % (ref 11.5–14.5)
GFR SERPLBLD BASED ON 1.73 SQ M-ARVRAT: 76.2 ML/MIN
GLOBULIN SER-MCNC: 4 G/DL (ref 2.2–3.8)
GLUCOSE SERPL-MCNC: 176 MG/DL (ref 70–99)
HCT VFR BLD CALC: 22.9 % (ref 36–47)
HCT VFR BLD CALC: 25.7 % (ref 36–47)
HGB BLD-MCNC: 7.6 G/DL (ref 12–15.5)
HGB BLD-MCNC: 8.5 G/DL (ref 12–15.5)
LYMPHOCYTES # BLD: 1.6 X10^3/UL (ref 1–4.8)
LYMPHOCYTES NFR BLD AUTO: 18 % (ref 24–48)
MCH RBC QN AUTO: 29 PG (ref 25–35)
MCH RBC QN AUTO: 30 PG (ref 25–35)
MCHC RBC AUTO-ENTMCNC: 33 G/DL (ref 31–37)
MCHC RBC AUTO-ENTMCNC: 33 G/DL (ref 31–37)
MCV RBC AUTO: 86 FL (ref 79–100)
MCV RBC AUTO: 90 FL (ref 79–100)
MONO #: 0.6 X10^3/UL (ref 0–1.1)
MONOCYTES NFR BLD: 6 % (ref 0–9)
NEUT #: 6.5 X10^3/UL (ref 1.8–7.7)
NEUTROPHILS NFR BLD AUTO: 74 % (ref 31–73)
PLATELET # BLD AUTO: 347 X10^3/UL (ref 140–400)
PLATELET # BLD AUTO: 349 X10^3/UL (ref 140–400)
POTASSIUM SERPL-SCNC: 3.9 MMOL/L (ref 3.5–5.1)
PROT SERPL-MCNC: 5.1 G/DL (ref 6.4–8.2)
RBC # BLD AUTO: 2.56 X10^6/UL (ref 3.5–5.4)
RBC # BLD AUTO: 2.99 X10^6/UL (ref 3.5–5.4)
SODIUM SERPL-SCNC: 146 MMOL/L (ref 136–145)
WBC # BLD AUTO: 7 X10^3/UL (ref 4–11)
WBC # BLD AUTO: 8.7 X10^3/UL (ref 4–11)

## 2020-06-12 RX ADMIN — INSULIN LISPRO SCH UNITS: 100 INJECTION, SOLUTION INTRAVENOUS; SUBCUTANEOUS at 12:11

## 2020-06-12 RX ADMIN — Medication PRN EACH: at 13:00

## 2020-06-12 RX ADMIN — METOPROLOL TARTRATE PRN MG: 5 INJECTION INTRAVENOUS at 16:34

## 2020-06-12 RX ADMIN — FENTANYL CITRATE PRN MCG: 50 INJECTION INTRAMUSCULAR; INTRAVENOUS at 20:36

## 2020-06-12 RX ADMIN — BACITRACIN SCH MLS/HR: 5000 INJECTION, POWDER, FOR SOLUTION INTRAMUSCULAR at 14:57

## 2020-06-12 RX ADMIN — INSULIN LISPRO SCH UNITS: 100 INJECTION, SOLUTION INTRAVENOUS; SUBCUTANEOUS at 06:22

## 2020-06-12 RX ADMIN — BACITRACIN SCH MLS/HR: 5000 INJECTION, POWDER, FOR SOLUTION INTRAMUSCULAR at 02:19

## 2020-06-12 RX ADMIN — INSULIN LISPRO SCH UNITS: 100 INJECTION, SOLUTION INTRAVENOUS; SUBCUTANEOUS at 00:00

## 2020-06-12 RX ADMIN — DILTIAZEM HYDROCHLORIDE SCH MLS/HR: 5 INJECTION INTRAVENOUS at 08:41

## 2020-06-12 RX ADMIN — METOPROLOL TARTRATE PRN MG: 5 INJECTION INTRAVENOUS at 12:11

## 2020-06-12 RX ADMIN — FENTANYL CITRATE PRN MCG: 50 INJECTION INTRAMUSCULAR; INTRAVENOUS at 16:34

## 2020-06-12 RX ADMIN — ONDANSETRON PRN MG: 2 INJECTION INTRAMUSCULAR; INTRAVENOUS at 16:34

## 2020-06-12 RX ADMIN — PANTOPRAZOLE SODIUM SCH MG: 40 INJECTION, POWDER, FOR SOLUTION INTRAVENOUS at 08:41

## 2020-06-12 RX ADMIN — ONDANSETRON PRN MG: 2 INJECTION INTRAMUSCULAR; INTRAVENOUS at 21:34

## 2020-06-12 RX ADMIN — DILTIAZEM HYDROCHLORIDE SCH MLS/HR: 5 INJECTION INTRAVENOUS at 10:44

## 2020-06-12 RX ADMIN — FENTANYL CITRATE PRN MCG: 50 INJECTION INTRAMUSCULAR; INTRAVENOUS at 10:44

## 2020-06-12 RX ADMIN — INSULIN LISPRO SCH UNITS: 100 INJECTION, SOLUTION INTRAVENOUS; SUBCUTANEOUS at 17:12

## 2020-06-12 RX ADMIN — DILTIAZEM HYDROCHLORIDE SCH MLS/HR: 5 INJECTION INTRAVENOUS at 20:35

## 2020-06-12 NOTE — PDOC
PROGRESS NOTES


Chief Complaint


Chief Complaint


A/P:


Acute hypoxic Respiratory failure requiring mechanical ventilation (now 

extubated for several days but still with tracheostomy)


Tracheostomy


bilateral pleural effusions/pulm edema 


Sepsis


Severe Acute gallstone pancreatitis (not a surgical candidate at this time) with

necrosis


Acute kidney failure now requiring dialysis


Salpingitis


Gallstones (Calculus of gallbladder with acute cholecystitis without 

obstruction)


HTN 


Leukocytosis 


Hypoxia


Uterine fibroid


Intractable pain


Intractable nausea


Covid 19 negative. 


Acute on chronic anemia 


EEG: No seizure activityFever  - better currently - intermittent could be from 

underlying pancreatitis blood cults 5/4 - neg so far


? Ileus with vomiting


Abd distention - U/S and CT reviewed s/p 0.4 L of opaque, debris-containing 

ascites was removed 5/6


Acute pancreatitis with persistent necrosis


- 4/27 status post ROBERT drain placement + C paropsilosis. s/p additional drains 5 /8


Anemia - S/p PRBCs


Cholelithiasis with thickening of the gallbladder wall.


Leucocytosis improving


JED, hyperkalemia, Metabolic acidosis off dialysis


Acute hypoxic resp failure ,bilateral pleural effusion and atelectasis


hypocalcemia 


Prediabetes


HTN


s/p trach


ESRD on HD


Hyperglycemia





FEN - 


PPX - SCDs, off lovenox currently


FULL CODE


Dispo - ICU, critically ill


CC time 49 minutes





History of Present Illness


History of Present Illness


6/8: IR placed drain on 6/7. 4u PRBC after Hb drop. Hb 8.8 today. Off Levophed 

this morning. T-max 100.3. Much more lethargic today. CXR with left sided dif

fuse infiltrates.


6/9: Tachycardic overnight into the 140s. NGT clamped. On BIPAP currently. 

Drains with serosanguinous discharge. WBC 8, Tmax 99.6F.


6/10: Seen on trach shield in ICU.  Hypertensive and tachycardic. Labs stable. 

blood stained drainage from drains. Afebrile.


6/11: Seen on trach shield in ICU. She is a bit confused, drowsy, but when 

sitting up is conversational and confusion somewhat clears. She is asking for 

more pain medication. Stable drains, still very tachy.Na 147





Patient vomited overnight. Aspirated. Tried to pull her trach out, she was told 

she would die without her trach, she said "I know, I just want to go home". Hb 

7.6. Afebrile, still very tachycardic.





6/5/2020


Patient seen and examined on telemetry floor today


This morning I had a couple of discussions with case management


The issue is the patient will not wear her trach cap


Without that she cannot advance her diet and is currently supposed to be on 

honey thick liquids


I was going to talk to the patient about wearing her trach But when I arrived to

the room she was lying on the floor with several nurses and therapist around


Apparently she did walk to the end of the garsia then back and then collapsed onto

the floor


She had a bowel movement when this all happened


Appears to be critically ill again


Very shaky tremulous anxious has a stare in her eyes


We got her back in bed


I am extremely concerned about her long-term prognosis again











6/4/2020


Patient seen and examined in the ICU


She remains critically ill is is extremely weak


Chart reviewed


Discussed with RN


We decided to try some Prozac as she does seem depressed


On IV TPN


Still has NG tube








6/3/2020


Patient seen and examined in the ICU


She remains critically ill


We have been trying to hold off on her Ativan for the past 24 hours


She is a little more awake but shaky and agitated and anxious seems depressed


Discussed with RN


Chart reviewed








6/2/2020


Patient seen and examined in the ICU


She is a little more alert today but still quite ill


Appears clammy and pale and depressed/anxious


Discussed with RN


Chart reviewed











6/1/2020


Patient seen and examined in the ICU


She appears extremely ill


She is tachypneic at 35 respirations per minute and tachycardic at 132 bpm


She is extremely encephalopathic and shaky


She appears clammy


Chart reviewed


Discussed with RN


Prognosis extremely guarded at best








5/31/2020


Patient still in ICU


Resting with no apparent distress


Chart reviewed








5/30/2020


Patient seen and examined in the ICU


She is wiping her face with a cough


Discussed with RN


Chart reviewed


We hope to get her out of the ICU later today if possible








5/29/2020


Patient seen and examined in the ICU once again


She is back on NG suction


On IV Zosyn


Has IV TPN


Sedated with Precedex but anxious still


Appears somewhat clammy and pale


Chart reviewed


Discussed with RN


She remains critically ill














5/28/2020


Patient seen and examined in the ICU


She has an NG that is clamped we are hoping to start some clear liquids but she 

looks quite ill


She is on IV TPN


Meropenem changed to IV Zosyn (agree)


She has [place] to bedside drainage


She is semi-sedated with Precedex


Chart reviewed


Discussed with RN


She remains critically ill











Seen bedside. Hb 8. She was just a bit hypoxic, had a mucous plug suctioned. 

Able to vocalize well with speaking valve, tells me we are being very hard on 

her and she would like to be drugged back to sleep.  She wants to wake up and 

feel better. On trach shield, 


T max 100.4. afebrile this a.m.





5/26/2020


Patient seen and examined in the ICU


She is up in the chair very frail trying to talk a little shaky


Discussed with RN


Chart reviewed








5/25/2020


Patient seen and examined in the ICU


Patient up in the chair


Having a severe coughing episode with a lot of phlegm coming out of her 

tracheostomy


Discussed with RN


Discussed with physical therapy


Chart reviewed











5/24,.    feels well,  has been out of room in wheelchair


no complaint,    still weak,   some with not wanting to wear her valve on trach


cont other,   may be able to work with speech tomorrow,    videoswallow OK to 

try, 








5/23,  anxiety is up today,   she dislikes the valve still,    


shower and outside today


.   





5/22 she doesnt want to wear her passy-kristan valve,  discussed str and plan with 

her.


speech following,  needs swallow study,   but needs to wear her valve longer,  


cont current


able to walk some, walker 





5/21  stronger,   better,   


we discussed better oral care


she would like to try swallow study,  wants to try to eat,    speech is 

following








5/20/2020 


She remains in the ICU


sitting up and working with OT,   getting better


if limit pain meds, may do better off the vent, 





Nurses trying to suction her,  that is also improved, 


Chart reviewed


 








5/19/2020


Patient seen and examined in the ICU


She had an episode yesterday of tachycardia and severe agitation we gave her 

some Ativan


After that she seemed to have stroke symptoms but now that the Ativan has wore 

off her stroke symptoms have resolved


 


 


She is on IV meropenem and daptomycin and micafungin


Chart reviewed


Discussed with RN


Patient is still critically ill


 


BRIEF OPERATIVE NOTE


Pre-Op Diagnosis


Pancreatitis with pseudocysts, suspected infection


Post-Op Diagnosis


same


Procedure Performed


CT abdominal Drains x 3


Surgeon


Hardy


Anesthesia Type:  Conscious Sedation


Findings


3 abdominal drains, 14F, with turbid pancreatic fluid and necrotic debris in 

each.


Complications


No immediate








5/9: Patient today somewhat restless and having bilious secretions from ET tube,

imaging studies ordered, discussed with consultant. Pretty poor prognosis, 

hopefully is not a fistula, poor surgical candidate. 





5/10: Imaging with no acute events, she seems more stable today compared to 

yesterday. Encouraged as much activity as possible patient at high risk for 

severe depression.





Vitals


Vitals





Vital Signs








  Date Time  Temp Pulse Resp B/P (MAP) Pulse Ox O2 Delivery O2 Flow Rate FiO2


 


6/12/20 08:26 98.7 124 42 147/75    





 98.7       


 


6/12/20 07:42      Trach Collar 10.0 


 


6/12/20 07:00     100   











Physical Exam


Physical Exam


GENERAL: Lethargic, opens eyes transiently


HEENT: NGT in place. Oral mucosa dry


NECK:  Tracheostomy 


LUNGS: Diminished aeration bases, no accessory muscle use 


HEART:  S1, S2, tachy, regular 


ABDOMEN: Mild distention, bowel sounds present, soft, grimaces to palpation 


Right lateral side drainage bag, 3 drains with bloodstained drainage


: [place] ( 6/ 7)


EXTREMITIES: Trace edema .no  cyanosis 


SKIN: no signs of gen rash 


CNS: Lethargic very weak


LUE-PICC (5/29) without signs of complications


General:  Alert, Cooperative, No acute distress


Heart:  Regular rate, Normal S1, Normal S2, No murmurs, Gallops


Lungs:  Other (diminshed in bases, Rhonci in LLL)


Abdomen:  Soft, Other (drains with brownish drainage, c/w pancreatic necrosis)


Extremities:  No clubbing, No cyanosis, No edema, Normal pulses, No 

tenderness/swelling


Skin:  Other (warm, dry)





Labs


LABS





Laboratory Tests








Test


 6/11/20


11:32 6/12/20


06:05 6/12/20


06:18


 


Glucose (Fingerstick)


 163 mg/dL


(70-99) 


 165 mg/dL


(70-99)


 


White Blood Count


 


 8.7 x10^3/uL


(4.0-11.0) 





 


Red Blood Count


 


 2.56 x10^6/uL


(3.50-5.40) 





 


Hemoglobin


 


 7.6 g/dL


(12.0-15.5) 





 


Hematocrit


 


 22.9 %


(36.0-47.0) 





 


Mean Corpuscular Volume  90 fL ()  


 


Mean Corpuscular Hemoglobin  30 pg (25-35)  


 


Mean Corpuscular Hemoglobin


Concent 


 33 g/dL


(31-37) 





 


Red Cell Distribution Width


 


 16.6 %


(11.5-14.5) 





 


Platelet Count


 


 349 x10^3/uL


(140-400) 





 


Neutrophils (%) (Auto)  74 % (31-73)  


 


Lymphocytes (%) (Auto)  18 % (24-48)  


 


Monocytes (%) (Auto)  6 % (0-9)  


 


Eosinophils (%) (Auto)  1 % (0-3)  


 


Basophils (%) (Auto)  0 % (0-3)  


 


Neutrophils # (Auto)


 


 6.5 x10^3/uL


(1.8-7.7) 





 


Lymphocytes # (Auto)


 


 1.6 x10^3/uL


(1.0-4.8) 





 


Monocytes # (Auto)


 


 0.6 x10^3/uL


(0.0-1.1) 





 


Eosinophils # (Auto)


 


 0.1 x10^3/uL


(0.0-0.7) 





 


Basophils # (Auto)


 


 0.0 x10^3/uL


(0.0-0.2) 





 


Sodium Level


 


 146 mmol/L


(136-145) 





 


Potassium Level


 


 3.9 mmol/L


(3.5-5.1) 





 


Chloride Level


 


 114 mmol/L


() 





 


Carbon Dioxide Level


 


 25 mmol/L


(21-32) 





 


Anion Gap  7 (6-14)  


 


Blood Urea Nitrogen


 


 29 mg/dL


(7-20) 





 


Creatinine


 


 0.8 mg/dL


(0.6-1.0) 





 


Estimated GFR


(Cockcroft-Gault) 


 76.2 


 





 


BUN/Creatinine Ratio  36 (6-20)  


 


Glucose Level


 


 176 mg/dL


(70-99) 





 


Calcium Level


 


 9.8 mg/dL


(8.5-10.1) 





 


Total Bilirubin


 


 0.4 mg/dL


(0.2-1.0) 





 


Aspartate Amino Transf


(AST/SGOT) 


 22 U/L (15-37) 


 





 


Alanine Aminotransferase


(ALT/SGPT) 


 16 U/L (14-59) 


 





 


Alkaline Phosphatase


 


 83 U/L


() 





 


Total Protein


 


 5.1 g/dL


(6.4-8.2) 





 


Albumin


 


 1.1 g/dL


(3.4-5.0) 





 


Albumin/Globulin Ratio  0.3 (1.0-1.7)  











Assessment and Plan


Assessmemt and Plan


Problems


Medical Problems:


(1) Acute pancreatitis


Status: Acute  





(2) Cholelithiasis


Status: Acute  











Comment


Review of Relevant


I have reviewed the following items josy (where applicable) has been applied.


Labs





Laboratory Tests








Test


 6/10/20


18:37 6/10/20


23:30 6/11/20


06:02 6/11/20


06:15


 


Glucose (Fingerstick)


 152 mg/dL


(70-99) 145 mg/dL


(70-99) 167 mg/dL


(70-99) 





 


White Blood Count


 


 


 


 9.8 x10^3/uL


(4.0-11.0)


 


Red Blood Count


 


 


 


 2.90 x10^6/uL


(3.50-5.40)


 


Hemoglobin


 


 


 


 8.6 g/dL


(12.0-15.5)


 


Hematocrit


 


 


 


 26.2 %


(36.0-47.0)


 


Mean Corpuscular Volume    91 fL () 


 


Mean Corpuscular Hemoglobin    30 pg (25-35) 


 


Mean Corpuscular Hemoglobin


Concent 


 


 


 33 g/dL


(31-37)


 


Red Cell Distribution Width


 


 


 


 17.4 %


(11.5-14.5)


 


Platelet Count


 


 


 


 335 x10^3/uL


(140-400)


 


Neutrophils (%) (Auto)    70 % (31-73) 


 


Lymphocytes (%) (Auto)    24 % (24-48) 


 


Monocytes (%) (Auto)    5 % (0-9) 


 


Eosinophils (%) (Auto)    1 % (0-3) 


 


Basophils (%) (Auto)    0 % (0-3) 


 


Neutrophils # (Auto)


 


 


 


 6.9 x10^3/uL


(1.8-7.7)


 


Lymphocytes # (Auto)


 


 


 


 2.4 x10^3/uL


(1.0-4.8)


 


Monocytes # (Auto)


 


 


 


 0.5 x10^3/uL


(0.0-1.1)


 


Eosinophils # (Auto)


 


 


 


 0.1 x10^3/uL


(0.0-0.7)


 


Basophils # (Auto)


 


 


 


 0.0 x10^3/uL


(0.0-0.2)


 


Sodium Level


 


 


 


 147 mmol/L


(136-145)


 


Potassium Level


 


 


 


 4.1 mmol/L


(3.5-5.1)


 


Chloride Level


 


 


 


 115 mmol/L


()


 


Carbon Dioxide Level


 


 


 


 22 mmol/L


(21-32)


 


Anion Gap    10 (6-14) 


 


Blood Urea Nitrogen


 


 


 


 32 mg/dL


(7-20)


 


Creatinine


 


 


 


 0.9 mg/dL


(0.6-1.0)


 


Estimated GFR


(Cockcroft-Gault) 


 


 


 66.5 





 


BUN/Creatinine Ratio    36 (6-20) 


 


Glucose Level


 


 


 


 181 mg/dL


(70-99)


 


Calcium Level


 


 


 


 9.9 mg/dL


(8.5-10.1)


 


Phosphorus Level


 


 


 


 4.0 mg/dL


(2.6-4.7)


 


Magnesium Level


 


 


 


 1.8 mg/dL


(1.8-2.4)


 


Total Bilirubin


 


 


 


 0.4 mg/dL


(0.2-1.0)


 


Aspartate Amino Transf


(AST/SGOT) 


 


 


 21 U/L (15-37) 





 


Alanine Aminotransferase


(ALT/SGPT) 


 


 


 17 U/L (14-59) 





 


Alkaline Phosphatase


 


 


 


 91 U/L


()


 


Total Protein


 


 


 


 5.2 g/dL


(6.4-8.2)


 


Albumin


 


 


 


 1.3 g/dL


(3.4-5.0)


 


Albumin/Globulin Ratio    0.3 (1.0-1.7) 


 


Triglycerides Level


 


 


 


 203 mg/dL


(0-150)


 


Test


 6/11/20


11:32 6/12/20


06:05 6/12/20


06:18 





 


Glucose (Fingerstick)


 163 mg/dL


(70-99) 


 165 mg/dL


(70-99) 





 


White Blood Count


 


 8.7 x10^3/uL


(4.0-11.0) 


 





 


Red Blood Count


 


 2.56 x10^6/uL


(3.50-5.40) 


 





 


Hemoglobin


 


 7.6 g/dL


(12.0-15.5) 


 





 


Hematocrit


 


 22.9 %


(36.0-47.0) 


 





 


Mean Corpuscular Volume  90 fL ()   


 


Mean Corpuscular Hemoglobin  30 pg (25-35)   


 


Mean Corpuscular Hemoglobin


Concent 


 33 g/dL


(31-37) 


 





 


Red Cell Distribution Width


 


 16.6 %


(11.5-14.5) 


 





 


Platelet Count


 


 349 x10^3/uL


(140-400) 


 





 


Neutrophils (%) (Auto)  74 % (31-73)   


 


Lymphocytes (%) (Auto)  18 % (24-48)   


 


Monocytes (%) (Auto)  6 % (0-9)   


 


Eosinophils (%) (Auto)  1 % (0-3)   


 


Basophils (%) (Auto)  0 % (0-3)   


 


Neutrophils # (Auto)


 


 6.5 x10^3/uL


(1.8-7.7) 


 





 


Lymphocytes # (Auto)


 


 1.6 x10^3/uL


(1.0-4.8) 


 





 


Monocytes # (Auto)


 


 0.6 x10^3/uL


(0.0-1.1) 


 





 


Eosinophils # (Auto)


 


 0.1 x10^3/uL


(0.0-0.7) 


 





 


Basophils # (Auto)


 


 0.0 x10^3/uL


(0.0-0.2) 


 





 


Sodium Level


 


 146 mmol/L


(136-145) 


 





 


Potassium Level


 


 3.9 mmol/L


(3.5-5.1) 


 





 


Chloride Level


 


 114 mmol/L


() 


 





 


Carbon Dioxide Level


 


 25 mmol/L


(21-32) 


 





 


Anion Gap  7 (6-14)   


 


Blood Urea Nitrogen


 


 29 mg/dL


(7-20) 


 





 


Creatinine


 


 0.8 mg/dL


(0.6-1.0) 


 





 


Estimated GFR


(Cockcroft-Gault) 


 76.2 


 


 





 


BUN/Creatinine Ratio  36 (6-20)   


 


Glucose Level


 


 176 mg/dL


(70-99) 


 





 


Calcium Level


 


 9.8 mg/dL


(8.5-10.1) 


 





 


Total Bilirubin


 


 0.4 mg/dL


(0.2-1.0) 


 





 


Aspartate Amino Transf


(AST/SGOT) 


 22 U/L (15-37) 


 


 





 


Alanine Aminotransferase


(ALT/SGPT) 


 16 U/L (14-59) 


 


 





 


Alkaline Phosphatase


 


 83 U/L


() 


 





 


Total Protein


 


 5.1 g/dL


(6.4-8.2) 


 





 


Albumin


 


 1.1 g/dL


(3.4-5.0) 


 





 


Albumin/Globulin Ratio  0.3 (1.0-1.7)   








Laboratory Tests








Test


 6/11/20


11:32 6/12/20


06:05 6/12/20


06:18


 


Glucose (Fingerstick)


 163 mg/dL


(70-99) 


 165 mg/dL


(70-99)


 


White Blood Count


 


 8.7 x10^3/uL


(4.0-11.0) 





 


Red Blood Count


 


 2.56 x10^6/uL


(3.50-5.40) 





 


Hemoglobin


 


 7.6 g/dL


(12.0-15.5) 





 


Hematocrit


 


 22.9 %


(36.0-47.0) 





 


Mean Corpuscular Volume  90 fL ()  


 


Mean Corpuscular Hemoglobin  30 pg (25-35)  


 


Mean Corpuscular Hemoglobin


Concent 


 33 g/dL


(31-37) 





 


Red Cell Distribution Width


 


 16.6 %


(11.5-14.5) 





 


Platelet Count


 


 349 x10^3/uL


(140-400) 





 


Neutrophils (%) (Auto)  74 % (31-73)  


 


Lymphocytes (%) (Auto)  18 % (24-48)  


 


Monocytes (%) (Auto)  6 % (0-9)  


 


Eosinophils (%) (Auto)  1 % (0-3)  


 


Basophils (%) (Auto)  0 % (0-3)  


 


Neutrophils # (Auto)


 


 6.5 x10^3/uL


(1.8-7.7) 





 


Lymphocytes # (Auto)


 


 1.6 x10^3/uL


(1.0-4.8) 





 


Monocytes # (Auto)


 


 0.6 x10^3/uL


(0.0-1.1) 





 


Eosinophils # (Auto)


 


 0.1 x10^3/uL


(0.0-0.7) 





 


Basophils # (Auto)


 


 0.0 x10^3/uL


(0.0-0.2) 





 


Sodium Level


 


 146 mmol/L


(136-145) 





 


Potassium Level


 


 3.9 mmol/L


(3.5-5.1) 





 


Chloride Level


 


 114 mmol/L


() 





 


Carbon Dioxide Level


 


 25 mmol/L


(21-32) 





 


Anion Gap  7 (6-14)  


 


Blood Urea Nitrogen


 


 29 mg/dL


(7-20) 





 


Creatinine


 


 0.8 mg/dL


(0.6-1.0) 





 


Estimated GFR


(Cockcroft-Gault) 


 76.2 


 





 


BUN/Creatinine Ratio  36 (6-20)  


 


Glucose Level


 


 176 mg/dL


(70-99) 





 


Calcium Level


 


 9.8 mg/dL


(8.5-10.1) 





 


Total Bilirubin


 


 0.4 mg/dL


(0.2-1.0) 





 


Aspartate Amino Transf


(AST/SGOT) 


 22 U/L (15-37) 


 





 


Alanine Aminotransferase


(ALT/SGPT) 


 16 U/L (14-59) 


 





 


Alkaline Phosphatase


 


 83 U/L


() 





 


Total Protein


 


 5.1 g/dL


(6.4-8.2) 





 


Albumin


 


 1.1 g/dL


(3.4-5.0) 





 


Albumin/Globulin Ratio  0.3 (1.0-1.7)  








Microbiology


6/7/20 Gram Stain - Final, Resulted


         


6/7/20 Aerobic and Anaerobic Culture - Preliminary, Resulted


         


6/7/20 Antimicrobic Susceptibility - Preliminary, Resulted


         


6/7/20 Blood Culture - Preliminary, Resulted


         NO GROWTH AFTER 4 DAYS


6/7/20 Urine Culture - Final, Complete


         


5/30/20 Gram Stain - Final, Complete


          


5/30/20 Aerobic Culture - Final, Complete


Medications





Current Medications


Sodium Chloride 1,000 ml @  1,000 mls/hr Q1H IV  Last administered on 3/16/20at 

03:00;  Start 3/16/20 at 03:00;  Stop 3/16/20 at 03:59;  Status DC


Ondansetron HCl (Zofran) 4 mg 1X  ONCE IVP  Last administered on 3/16/20at 

03:27;  Start 3/16/20 at 03:00;  Stop 3/16/20 at 03:01;  Status DC


Morphine Sulfate (Morphine Sulfate) 4 mg 1X  ONCE IV ;  Start 3/16/20 at 03:00; 

Stop 3/16/20 at 03:01;  Status Cancel


Ketorolac Tromethamine (Toradol 30mg Vial) 30 mg 1X  ONCE IV  Last administered 

on 3/16/20at 02:54;  Start 3/16/20 at 03:00;  Stop 3/16/20 at 03:01;  Status DC


Fentanyl Citrate (Fentanyl 2ml Vial) 25 mcg 1X  ONCE IVP  Last administered on 

3/16/20at 03:23;  Start 3/16/20 at 03:30;  Stop 3/16/20 at 03:31;  Status DC


Fentanyl Citrate (Fentanyl 2ml Vial) 100 mcg STK-MED ONCE .ROUTE ;  Start 

3/16/20 at 03:18;  Stop 3/16/20 at 03:18;  Status DC


Iohexol (Omnipaque 350 Mg/ml) 90 ml 1X  ONCE IV  Last administered on 3/16/20at 

03:25;  Start 3/16/20 at 03:30;  Stop 3/16/20 at 03:31;  Status DC


Info (CONTRAST GIVEN -- Rx MONITORING) 1 each PRN DAILY  PRN MC SEE COMMENTS;  

Start 3/16/20 at 03:30;  Stop 3/18/20 at 03:29;  Status DC


Hydromorphone HCl (Dilaudid) 0.5 mg 1X  ONCE IV  Last administered on 3/16/20at 

03:55;  Start 3/16/20 at 04:30;  Stop 3/16/20 at 04:32;  Status DC


Ondansetron HCl (Zofran) 4 mg PRN Q8HRS  PRN IV NAUSEA/VOMITING 1ST CHOICE;  

Start 3/16/20 at 05:00;  Stop 3/16/20 at 09:27;  Status DC


Morphine Sulfate (Morphine Sulfate) 2 mg PRN Q2HR  PRN IV SEVERE PAIN 7-10 Last 

administered on 3/17/20at 12:26;  Start 3/16/20 at 05:00;  Stop 3/17/20 at 

14:15;  Status DC


Sodium Chloride 1,000 ml @  125 mls/hr Q8H IV  Last administered on 3/16/20at 

20:56;  Start 3/16/20 at 05:00;  Stop 3/17/20 at 04:59;  Status DC


Hydromorphone HCl (Dilaudid) 0.5 mg PRN Q3HRS  PRN IV SEVERE PAIN 7-10 Last 

administered on 3/17/20at 10:06;  Start 3/16/20 at 05:00;  Stop 3/17/20 at 

12:01;  Status DC


Piperacillin Sod/ Tazobactam Sod 4.5 gm/Sodium Chloride 100 ml @  200 mls/hr 1X 

ONCE IV  Last administered on 3/16/20at 05:44;  Start 3/16/20 at 06:00;  Stop 

3/16/20 at 06:29;  Status DC


Ondansetron HCl (Zofran) 4 mg PRN Q4HRS  PRN IV NAUSEA/VOMITING 1ST CHOICE Last 

administered on 6/11/20at 15:22;  Start 3/16/20 at 09:30


Insulin Human Lispro (HumaLOG) 0-9 UNITS Q6HRS SQ  Last administered on 

6/12/20at 06:22;  Start 3/16/20 at 09:30


Dextrose (Dextrose 50%-Water Syringe) 12.5 gm PRN Q15MIN  PRN IV SEE COMMENTS;  

Start 3/16/20 at 09:30


Pantoprazole Sodium (PROTONIX VIAL for IV PUSH) 40 mg DAILYAC IVP  Last 

administered on 6/11/20at 09:22;  Start 3/16/20 at 11:30


Prochlorperazine Edisylate (Compazine) 10 mg PRN Q6HRS  PRN IV NAUSEA/VOMITING, 

2nd CHOICE Last administered on 6/10/20at 14:33;  Start 3/16/20 at 17:45


Atenolol (Tenormin) 100 mg DAILY PO ;  Start 3/17/20 at 09:00;  Stop 3/16/20 at 

20:08;  Status DC


Metoprolol Tartrate (Lopressor Vial) 2.5 mg Q6HRS IVP  Last administered on 

3/17/20at 05:51;  Start 3/16/20 at 20:15;  Stop 3/17/20 at 10:02;  Status DC


Metoprolol Tartrate (Lopressor Vial) 5 mg Q6HRS IVP  Last administered on 

3/26/20at 00:12;  Start 3/17/20 at 10:15;  Stop 3/28/20 at 08:48;  Status DC


Hydromorphone HCl (Dilaudid) 1 mg PRN Q3HRS  PRN IV SEVERE PAIN 7-10 Last 

administered on 3/23/20at 05:13;  Start 3/17/20 at 12:00;  Stop 3/31/20 at 

00:25;  Status DC


Lidocaine HCl (Buffered Lidocaine 1%) 3 ml STK-MED ONCE .ROUTE ;  Start 3/17/20 

at 12:55;  Stop 3/17/20 at 12:56;  Status DC


Albumin Human 500 ml @  125 mls/hr 1X  ONCE IV  Last administered on 3/17/20at 

14:33;  Start 3/17/20 at 14:30;  Stop 3/17/20 at 18:32;  Status DC


Norepinephrine Bitartrate 8 mg/ Dextrose 258 ml @  17.299 mls/ hr CONT  PRN IV 

PER PROTOCOL Last administered on 4/14/20at 12:48;  Start 3/17/20 at 15:30;  

Stop 4/17/20 at 09:19;  Status DC


Sodium Chloride 1,000 ml @  125 mls/hr Q8H IV  Last administered on 3/17/20at 

21:04;  Start 3/17/20 at 16:00;  Stop 3/18/20 at 02:42;  Status DC


Albumin Human 500 ml @  125 mls/hr PRN BID  PRN IV After every 2L NSS & BP < 

90mm Last administered on 6/6/20at 11:40;  Start 3/17/20 at 16:00


Iohexol (Omnipaque 300 Mg/ml) 60 ml 1X  ONCE IV  Last administered on 3/17/20at 

17:20;  Start 3/17/20 at 17:00;  Stop 3/17/20 at 17:01;  Status DC


Info (CONTRAST GIVEN -- Rx MONITORING) 1 each PRN DAILY  PRN MC SEE COMMENTS;  

Start 3/17/20 at 17:00;  Stop 3/19/20 at 16:59;  Status DC


Meropenem 1 gm/ Sodium Chloride 100 ml @  200 mls/hr Q8HRS IV  Last administered

on 3/18/20at 05:45;  Start 3/17/20 at 20:00;  Stop 3/18/20 at 08:48;  Status DC


Furosemide (Lasix) 40 mg 1X  ONCE IVP  Last administered on 3/17/20at 22:12;  

Start 3/17/20 at 22:30;  Stop 3/17/20 at 22:31;  Status DC


Calcium Chloride 1000 mg/Sodium Chloride 110 ml @  220 mls/hr 1X  ONCE IV  Last 

administered on 3/17/20at 22:11;  Start 3/17/20 at 22:30;  Stop 3/17/20 at 

22:59;  Status DC


Albuterol Sulfate (Ventolin Neb Soln) 2.5 mg 1X  ONCE NEB  Last administered on 

3/18/20at 00:56;  Start 3/17/20 at 22:30;  Stop 3/17/20 at 22:31;  Status DC


Insulin Human Regular (HumuLIN R VIAL) 5 unit 1X  ONCE IV  Last administered on 

3/17/20at 22:14;  Start 3/17/20 at 22:30;  Stop 3/17/20 at 22:31;  Status DC


Magnesium Sulfate 50 ml @ 25 mls/hr 1X  ONCE IV  Last administered on 3/18/20at 

02:57;  Start 3/18/20 at 03:00;  Stop 3/18/20 at 04:59;  Status DC


Calcium Gluconate 1000 mg/Sodium Chloride 110 ml @  220 mls/hr 1X  ONCE IV  Last

administered on 3/18/20at 02:46;  Start 3/18/20 at 03:00;  Stop 3/18/20 at 

03:29;  Status DC


Sodium Chloride 1,000 ml @  200 mls/hr Q5H IV  Last administered on 3/18/20at 

02:46;  Start 3/18/20 at 03:00;  Stop 3/18/20 at 10:21;  Status DC


Calcium Gluconate 1000 mg/Sodium Chloride 110 ml @  220 mls/hr 1X  ONCE IV  Last

administered on 3/18/20at 03:21;  Start 3/18/20 at 03:30;  Stop 3/18/20 at 

03:59;  Status DC


Sodium Bicarbonate 50 meq/Sodium Chloride 1,050 ml @  75 mls/hr Q14H IV  Last 

administered on 3/22/20at 21:10;  Start 3/18/20 at 07:30;  Stop 3/23/20 at 

10:28;  Status DC


Calcium Gluconate 2000 mg/Sodium Chloride 120 ml @  220 mls/hr 1X  ONCE IV  Last

administered on 3/18/20at 09:05;  Start 3/18/20 at 07:30;  Stop 3/18/20 at 

08:02;  Status DC


Lidocaine HCl (Xylocaine-Mpf 1% 2ml Vial) 2 ml STK-MED ONCE .ROUTE ;  Start 

3/18/20 at 08:47;  Stop 3/18/20 at 08:47;  Status DC


Meropenem 500 mg/ Sodium Chloride 50 ml @  100 mls/hr Q12HR IV  Last 

administered on 3/23/20at 21:01;  Start 3/18/20 at 18:00;  Stop 3/24/20 at 

07:58;  Status DC


Lidocaine HCl (Buffered Lidocaine 1%) 3 ml STK-MED ONCE .ROUTE ;  Start 3/18/20 

at 09:46;  Stop 3/18/20 at 09:46;  Status DC


Lidocaine HCl (Buffered Lidocaine 1%) 6 ml 1X  ONCE INJ  Last administered on 

3/18/20at 10:26;  Start 3/18/20 at 10:15;  Stop 3/18/20 at 10:16;  Status DC


Info (Tpn Per Pharmacy) 1 each PRN DAILY  PRN MC SEE COMMENTS Last administered 

on 6/11/20at 12:57;  Start 3/18/20 at 12:00


Sodium Chloride 1,000 ml @  1,000 mls/hr Q1H PRN IV hypotension;  Start 3/18/20 

at 12:07;  Stop 3/18/20 at 18:06;  Status DC


Diphenhydramine HCl (Benadryl) 25 mg 1X PRN  PRN IV ITCHING;  Start 3/18/20 at 

12:15;  Stop 3/19/20 at 12:14;  Status DC


Diphenhydramine HCl (Benadryl) 25 mg 1X PRN  PRN IV ITCHING;  Start 3/18/20 at 

12:15;  Stop 3/19/20 at 12:14;  Status DC


Sodium Chloride 1,000 ml @  400 mls/hr Q2H30M PRN IV PATENCY;  Start 3/18/20 at 

12:07;  Stop 3/19/20 at 00:06;  Status DC


Info (PHARMACY MONITORING -- do not chart) 1 each PRN DAILY  PRN MC SEE 

COMMENTS;  Start 3/18/20 at 12:15;  Stop 3/20/20 at 08:13;  Status DC


Sodium Chloride 90 meq/Calcium Gluconate 10 meq/ Multivitamins 10 ml/Chromium/ 

Copper/Manganese/ Seleni/Zn 1 ml/ Total Parenteral Nutrition/Amino 

Acids/Dextrose/ Fat Emulsion Intravenous 55.005 ml  @ 2.292 mls/hr TPN  CONT IV 

;  Start 3/18/20 at 22:00;  Stop 3/18/20 at 12:33;  Status DC


Info (Tpn Per Pharmacy) 1 each PRN DAILY  PRN MC SEE COMMENTS;  Start 3/18/20 at

12:30;  Status UNV


Sodium Chloride 90 meq/Calcium Gluconate 10 meq/ Multivitamins 10 ml/Chromium/ 

Copper/Manganese/ Seleni/Zn 0.5 ml/ Total Parenteral Nutrition/Amino 

Acids/Dextrose/ Fat Emulsion Intravenous 1,512 ml @  63 mls/hr TPN  CONT IV  

Last administered on 3/18/20at 22:06;  Start 3/18/20 at 22:00;  Stop 3/19/20 at 

21:59;  Status DC


Calcium Carbonate/ Glycine (Tums) 500 mg PRN AFTMEALHC  PRN PO INDIGESTION;  

Start 3/18/20 at 17:45;  Stop 5/13/20 at 10:25;  Status DC


Calcium Gluconate (Calcium Gluconate) 2,000 mg 1X  ONCE IVP  Last administered 

on 3/19/20at 02:19;  Start 3/19/20 at 02:15;  Stop 3/19/20 at 02:16;  Status DC


Calcium Chloride 3000 mg/Sodium Chloride 1,030 ml @  50 mls/hr [id number] IV  Last 

administered on 3/21/20at 02:17;  Start 3/19/20 at 08:00;  Stop 3/21/20 at 

15:23;  Status DC


Lorazepam (Ativan Inj) 1 mg PRN Q4HRS  PRN IVP ANXIETY / AGITATION, 2nd choic 

Last administered on 4/17/20at 03:51;  Start 3/19/20 at 09:00;  Stop 4/17/20 at 

09:19;  Status DC


Sodium Chloride 1,000 ml @  1,000 mls/hr Q1H PRN IV hypotension;  Start 3/19/20 

at 08:56;  Stop 3/19/20 at 14:55;  Status DC


Albumin Human 200 ml @  200 mls/hr 1X PRN  PRN IV Hypotension;  Start 3/19/20 at

09:00;  Stop 3/19/20 at 14:59;  Status DC


Diphenhydramine HCl (Benadryl) 25 mg 1X PRN  PRN IV ITCHING;  Start 3/19/20 at 

09:00;  Stop 3/20/20 at 08:59;  Status DC


Diphenhydramine HCl (Benadryl) 25 mg 1X PRN  PRN IV ITCHING;  Start 3/19/20 at 

09:00;  Stop 3/20/20 at 08:59;  Status DC


Sodium Chloride 1,000 ml @  400 mls/hr Q2H30M PRN IV PATENCY;  Start 3/19/20 at 

08:56;  Stop 3/19/20 at 20:55;  Status DC


Info (PHARMACY MONITORING -- do not chart) 1 each PRN DAILY  PRN MC SEE 

COMMENTS;  Start 3/19/20 at 09:00;  Status UNV


Info (PHARMACY MONITORING -- do not chart) 1 each PRN DAILY  PRN MC SEE COMM

ENTS;  Start 3/19/20 at 09:00;  Stop 3/20/20 at 08:13;  Status DC


Digoxin (Lanoxin) 500 mcg 1X  ONCE IV  Last administered on 3/19/20at 10:04;  

Start 3/19/20 at 10:00;  Stop 3/19/20 at 10:01;  Status DC


Digoxin (Lanoxin) 125 mcg 1X  ONCE IV  Last administered on 3/19/20at 17:10;  

Start 3/19/20 at 18:00;  Stop 3/19/20 at 18:01;  Status DC


Magnesium Sulfate 100 ml @  25 mls/hr 1X  ONCE IV  Last administered on 

3/19/20at 12:48;  Start 3/19/20 at 13:00;  Stop 3/19/20 at 16:59;  Status DC


Sodium Chloride 90 meq/Magnesium Sulfate 10 meq/ Calcium Gluconate 20 meq/ 

Multivitamins 10 ml/Chromium/ Copper/Manganese/ Seleni/Zn 0.5 ml/ Total Pare

nteral Nutrition/Amino Acids/Dextrose/ Fat Emulsion Intravenous 1,512 ml @  63 

mls/hr TPN  CONT IV  Last administered on 3/19/20at 22:25;  Start 3/19/20 at 

22:00;  Stop 3/20/20 at 21:59;  Status DC


Sodium Chloride 1,000 ml @  1,000 mls/hr Q1H PRN IV hypotension;  Start 3/20/20 

at 08:05;  Stop 3/20/20 at 14:04;  Status DC


Albumin Human 200 ml @  200 mls/hr 1X  ONCE IV  Last administered on 3/20/20at 

08:57;  Start 3/20/20 at 08:15;  Stop 3/20/20 at 09:14;  Status DC


Diphenhydramine HCl (Benadryl) 25 mg 1X PRN  PRN IV ITCHING;  Start 3/20/20 at 

08:15;  Stop 3/21/20 at 08:14;  Status DC


Diphenhydramine HCl (Benadryl) 25 mg 1X PRN  PRN IV ITCHING;  Start 3/20/20 at 

08:15;  Stop 3/21/20 at 08:14;  Status DC


Sodium Chloride 1,000 ml @  400 mls/hr Q2H30M PRN IV PATENCY;  Start 3/20/20 at 

08:05;  Stop 3/20/20 at 20:04;  Status DC


Info (PHARMACY MONITORING -- do not chart) 1 each PRN DAILY  PRN MC SEE 

COMMENTS;  Start 3/20/20 at 08:15;  Stop 3/24/20 at 07:57;  Status DC


Sodium Chloride 90 meq/Potassium Chloride 15 meq/ Potassium Phosphate 10 mmol/ 

Magnesium Sulfate 10 meq/Calcium Gluconate 20 meq/ Multivitamins 10 ml/Chromium/

Copper/Manganese/ Seleni/Zn 0.5 ml/ Total Parenteral Nutrition/Amino 

Acids/Dextrose/ Fat Emulsion Intravenous 1,512 ml @  63 mls/hr TPN  CONT IV  

Last administered on 3/20/20at 21:01;  Start 3/20/20 at 22:00;  Stop 3/21/20 at 

21:59;  Status DC


Potassium Chloride/Water 100 ml @  100 mls/hr 1X  ONCE IV  Last administered on 

3/20/20at 14:09;  Start 3/20/20 at 14:00;  Stop 3/20/20 at 14:59;  Status DC


Benzocaine (Hurricaine One) 1 spray 1X  ONCE MM  Last administered on 3/20/20at 

16:38;  Start 3/20/20 at 14:30;  Stop 3/20/20 at 14:31;  Status DC


Lidocaine HCl (Glydo (Lidocaine) Jelly) 1 thomas 1X  ONCE MM  Last administered on 

3/20/20at 16:38;  Start 3/20/20 at 14:30;  Stop 3/20/20 at 14:31;  Status DC


Linezolid/Dextrose 300 ml @  300 mls/hr Q12HR IV  Last administered on 3/26/20at

21:04;  Start 3/20/20 at 20:00;  Stop 3/27/20 at 07:50;  Status DC


Acetaminophen (Tylenol) 650 mg PRN Q6HRS  PRN PO MILD PAIN / TEMP;  Start 

3/21/20 at 03:30;  Stop 3/21/20 at 03:36;  Status DC


Acetaminophen (Tylenol) 650 mg PRN Q6HRS  PRN PEG MILD PAIN / TEMP Last 

administered on 4/16/20at 19:56;  Start 3/21/20 at 03:36;  Stop 5/13/20 at 

10:25;  Status DC


Sodium Chloride 1,000 ml @  1,000 mls/hr Q1H PRN IV hypotension;  Start 3/21/20 

at 07:50;  Stop 3/21/20 at 13:49;  Status DC


Albumin Human 200 ml @  200 mls/hr 1X PRN  PRN IV Hypotension;  Start 3/21/20 at

08:00;  Stop 3/21/20 at 13:59;  Status DC


Sodium Chloride (Normal Saline Flush) 10 ml 1X PRN  PRN IV AP catheter pack;  

Start 3/21/20 at 08:00;  Stop 3/22/20 at 07:59;  Status DC


Sodium Chloride (Normal Saline Flush) 10 ml 1X PRN  PRN IV  catheter pack;  

Start 3/21/20 at 08:00;  Stop 3/22/20 at 07:59;  Status DC


Sodium Chloride 1,000 ml @  400 mls/hr Q2H30M PRN IV PATENCY;  Start 3/21/20 at 

07:50;  Stop 3/21/20 at 19:49;  Status DC


Info (PHARMACY MONITORING -- do not chart) 1 each PRN DAILY  PRN MC SEE 

COMMENTS;  Start 3/21/20 at 08:00;  Status UNV


Info (PHARMACY MONITORING -- do not chart) 1 each PRN DAILY  PRN MC SEE 

COMMENTS;  Start 3/21/20 at 08:00;  Stop 3/23/20 at 08:25;  Status DC


Sodium Chloride 90 meq/Potassium Chloride 15 meq/ Potassium Phosphate 10 mmol/ 

Magnesium Sulfate 10 meq/Calcium Gluconate 20 meq/ Multivitamins 10 ml/Chromium/

Copper/Manganese/ Seleni/Zn 0.5 ml/ Total Parenteral Nutrition/Amino 

Acids/Dextrose/ Fat Emulsion Intravenous 1,512 ml @  63 mls/hr TPN  CONT IV  

Last administered on 3/21/20at 20:57;  Start 3/21/20 at 22:00;  Stop 3/22/20 at 

21:59;  Status DC


Sodium Chloride 90 meq/Potassium Chloride 15 meq/ Potassium Phosphate 15 mmol/ 

Magnesium Sulfate 10 meq/Calcium Gluconate 20 meq/ Multivitamins 10 ml/Chromium/

Copper/Manganese/ Seleni/Zn 0.5 ml/ Total Parenteral Nutrition/Amino 

Acids/Dextrose/ Fat Emulsion Intravenous 1,512 ml @  63 mls/hr TPN  CONT IV ;  

Start 3/22/20 at 22:00;  Stop 3/22/20 at 14:16;  Status DC


Sodium Chloride 90 meq/Potassium Chloride 15 meq/ Potassium Phosphate 15 mmol/ 

Magnesium Sulfate 10 meq/Calcium Gluconate 20 meq/ Multivitamins 10 ml/Chromium/

Copper/Manganese/ Seleni/Zn 0.5 ml/ Total Parenteral Nutrition/Amino 

Acids/Dextrose/ Fat Emulsion Intravenous 1,200 ml @  50 mls/hr TPN  CONT IV ;  

Start 3/22/20 at 22:00;  Stop 3/22/20 at 14:17;  Status DC


Sodium Chloride 90 meq/Potassium Chloride 15 meq/ Potassium Phosphate 10 mmol/ 

Magnesium Sulfate 10 meq/Calcium Gluconate 20 meq/ Multivitamins 10 ml/Chromium/

Copper/Manganese/ Seleni/Zn 0.5 ml/ Total Parenteral Nutrition/Amino 

Acids/Dextrose/ Fat Emulsion Intravenous 1,200 ml @  50 mls/hr TPN  CONT IV  

Last administered on 3/22/20at 23:29;  Start 3/22/20 at 22:00;  Stop 3/23/20 at 

21:59;  Status DC


Sodium Chloride 1,000 ml @  1,000 mls/hr Q1H PRN IV hypotension;  Start 3/23/20 

at 07:28;  Stop 3/23/20 at 13:27;  Status DC


Albumin Human 200 ml @  200 mls/hr 1X  ONCE IV  Last administered on 3/23/20at 

08:51;  Start 3/23/20 at 07:30;  Stop 3/23/20 at 08:29;  Status DC


Diphenhydramine HCl (Benadryl) 25 mg 1X PRN  PRN IV ITCHING;  Start 3/23/20 at 

07:30;  Stop 3/24/20 at 07:29;  Status DC


Diphenhydramine HCl (Benadryl) 25 mg 1X PRN  PRN IV ITCHING;  Start 3/23/20 at 

07:30;  Stop 3/24/20 at 07:29;  Status DC


Sodium Chloride 1,000 ml @  400 mls/hr Q2H30M PRN IV PATENCY;  Start 3/23/20 at 

07:28;  Stop 3/23/20 at 19:27;  Status DC


Info (PHARMACY MONITORING -- do not chart) 1 each PRN DAILY  PRN MC SEE 

COMMENTS;  Start 3/23/20 at 07:30;  Stop 4/3/20 at 13:01;  Status DC


Metronidazole 100 ml @  100 mls/hr Q6HRS IV  Last administered on 4/8/20at 

06:26;  Start 3/23/20 at 08:30;  Stop 4/8/20 at 09:58;  Status DC


Micafungin Sodium 100 mg/Dextrose 100 ml @  100 mls/hr Q24H IV  Last 

administered on 4/30/20at 08:18;  Start 3/23/20 at 09:00;  Stop 4/30/20 at 

20:58;  Status DC


Propofol 0 ml @ As Directed STK-MED ONCE IV ;  Start 3/23/20 at 07:53;  Stop 

3/23/20 at 07:53;  Status DC


Etomidate (Amidate) 20 mg STK-MED ONCE IV ;  Start 3/23/20 at 07:53;  Stop 

3/23/20 at 07:54;  Status DC


Midazolam HCl (Versed) 5 mg STK-MED ONCE .ROUTE ;  Start 3/23/20 at 07:57;  Stop

3/23/20 at 07:57;  Status DC


Fentanyl Citrate 30 ml @ 0 mls/hr CONT  PRN IV SEE PROTOCOL Last administered on

4/17/20at 06:12;  Start 3/23/20 at 08:15;  Stop 4/17/20 at 09:19;  Status DC


Artificial Tears (Artificial Tears) 1 drop PRN Q1HR  PRN OU DRY EYE, 1st choice;

 Start 3/23/20 at 08:15;  Stop 4/29/20 at 05:31;  Status DC


Midazolam HCl 50 mg/Sodium Chloride 50 ml @ 0 mls/hr CONT  PRN IV SEE PROTOCOL 

Last administered on 3/26/20at 22:39;  Start 3/23/20 at 08:15;  Stop 3/28/20 at 

15:59;  Status DC


Etomidate (Amidate) 8 mg 1X  ONCE IV  Last administered on 3/23/20at 08:33;  

Start 3/23/20 at 08:30;  Stop 3/23/20 at 08:31;  Status DC


Succinylcholine Chloride (Anectine) 120 mg 1X  ONCE IV  Last administered on 

3/23/20at 08:34;  Start 3/23/20 at 08:30;  Stop 3/23/20 at 08:31;  Status DC


Midazolam HCl (Versed) 5 mg 1X  ONCE IV ;  Start 3/23/20 at 08:30;  Stop 3/23/20

at 08:31;  Status DC


Potassium Chloride 15 meq/ Bicarbonate Dialysis Soln w/ out KCl 5,007.5 ml  @ 

1,000 mls/ hr Q5H1M IV  Last administered on 3/24/20at 11:11;  Start 3/23/20 at 

12:00;  Stop 3/24/20 at 11:15;  Status DC


Potassium Chloride 15 meq/ Bicarbonate Dialysis Soln w/ out KCl 5,007.5 ml  @ 

1,000 mls/ hr Q5H1M IV  Last administered on 3/24/20at 11:12;  Start 3/23/20 at 

12:00;  Stop 3/24/20 at 11:17;  Status DC


Potassium Chloride 15 meq/ Bicarbonate Dialysis Soln w/ out KCl 5,007.5 ml  @ 

1,000 mls/ hr Q5H1M IV  Last administered on 3/24/20at 11:11;  Start 3/23/20 at 

12:00;  Stop 3/24/20 at 11:19;  Status DC


Sodium Chloride 90 meq/Potassium Chloride 15 meq/ Potassium Phosphate 10 mmol/ 

Magnesium Sulfate 10 meq/Calcium Gluconate 20 meq/ Multivitamins 10 ml/Chromium/

Copper/Manganese/ Seleni/Zn 0.5 ml/ Total Parenteral Nutrition/Amino 

Acids/Dextrose/ Fat Emulsion Intravenous 1,400 ml @  58.333 mls/ hr TPN  CONT IV

 Last administered on 3/23/20at 21:42;  Start 3/23/20 at 22:00;  Stop 3/24/20 at

21:59;  Status DC


Heparin Sodium (Porcine) (Heparin Sodium) 5,000 unit Q8HRS SQ  Last administered

on 3/28/20at 05:55;  Start 3/23/20 at 15:00;  Stop 3/28/20 at 13:28;  Status DC


Meropenem 500 mg/ Sodium Chloride 50 ml @  100 mls/hr Q6HRS IV  Last 

administered on 3/25/20at 06:00;  Start 3/24/20 at 09:00;  Stop 3/25/20 at 

07:29;  Status DC


Potassium Phosphate 20 mmol/ Sodium Chloride 106.6667 ml @  51.667 m... 1X  ONCE

IV  Last administered on 3/24/20at 11:22;  Start 3/24/20 at 10:15;  Stop 3/24/20

at 12:18;  Status DC


Acetaminophen (Tylenol Supp) 650 mg PRN Q6HRS  PRN TN MILD PAIN / TEMP > 100.3'F

Last administered on 6/10/20at 22:16;  Start 3/24/20 at 10:30


Potassium Chloride/Water 100 ml @  100 mls/hr Q1H IV  Last administered on 

3/24/20at 12:12;  Start 3/24/20 at 11:00;  Stop 3/24/20 at 12:59;  Status DC


Potassium Chloride 20 meq/ Bicarbonate Dialysis Soln w/ out KCl 5,010 ml @  

1,000 mls/hr Q5H1M IV  Last administered on 3/25/20at 08:48;  Start 3/24/20 at 1

2:00;  Stop 3/25/20 at 13:03;  Status DC


Potassium Chloride 20 meq/ Bicarbonate Dialysis Soln w/ out KCl 5,010 ml @  

1,000 mls/hr Q5H1M IV  Last administered on 3/29/20at 14:52;  Start 3/24/20 at 

11:30;  Stop 3/29/20 at 19:59;  Status DC


Potassium Chloride 20 meq/ Bicarbonate Dialysis Soln w/ out KCl 5,010 ml @  

1,000 mls/hr Q5H1M IV  Last administered on 3/29/20at 14:53;  Start 3/24/20 at 

11:30;  Stop 3/29/20 at 19:59;  Status DC


Sodium Chloride 90 meq/Potassium Chloride 15 meq/ Potassium Phosphate 15 mmol/ 

Magnesium Sulfate 10 meq/Calcium Gluconate 15 meq/ Multivitamins 10 ml/Chromium/

Copper/Manganese/ Seleni/Zn 0.5 ml/ Total Parenteral Nutrition/Amino 

Acids/Dextrose/ Fat Emulsion Intravenous 1,400 ml @  58.333 mls/ hr TPN  CONT IV

 Last administered on 3/24/20at 22:17;  Start 3/24/20 at 22:00;  Stop 3/25/20 at

21:59;  Status DC


Cefepime HCl (Maxipime) 2 gm Q12HR IVP  Last administered on 4/7/20at 20:56;  

Start 3/25/20 at 09:00;  Stop 4/8/20 at 09:58;  Status DC


Daptomycin 500 mg/ Sodium Chloride 50 ml @  100 mls/hr Q48H IV  Last 

administered on 4/10/20at 09:57;  Start 3/25/20 at 08:30;  Stop 4/10/20 at 

10:07;  Status DC


Lidocaine HCl (Buffered Lidocaine 1%) 3 ml 1X  ONCE INJ  Last administered on 

3/25/20at 10:27;  Start 3/25/20 at 10:30;  Stop 3/25/20 at 10:31;  Status DC


Potassium Phosphate 20 mmol/ Sodium Chloride 106.6667 ml @  51.667 m... 1X  ONCE

IV  Last administered on 3/25/20at 12:51;  Start 3/25/20 at 13:00;  Stop 3/25/20

at 15:03;  Status DC


Sodium Chloride 90 meq/Potassium Chloride 15 meq/ Potassium Phosphate 18 mmol/ 

Magnesium Sulfate 8 meq/Calcium Gluconate 15 meq/ Multivitamins 10 ml/Chromium/ 

Copper/Manganese/ Seleni/Zn 0.5 ml/ Total Parenteral Nutrition/Amino 

Acids/Dextrose/ Fat Emulsion Intravenous 1,400 ml @  58.333 mls/ hr TPN  CONT IV

 Last administered on 3/25/20at 22:16;  Start 3/25/20 at 22:00;  Stop 3/26/20 at

21:59;  Status DC


Potassium Chloride 20 meq/ Bicarbonate Dialysis Soln w/ out KCl 5,010 ml @  

1,000 mls/hr Q5H1M IV  Last administered on 3/29/20at 14:54;  Start 3/25/20 at 

16:00;  Stop 3/29/20 at 19:59;  Status DC


Multi-Ingred Cream/Lotion/Oil/ Oint (Artificial Tears Eye Ointment) 1 thomas PRN 

Q1HR  PRN OU DRY EYE, 2nd choice Last administered on 4/13/20at 08:19;  Start 

3/25/20 at 17:30;  Stop 6/3/20 at 14:39;  Status DC


Sodium Chloride 90 meq/Potassium Chloride 15 meq/ Potassium Phosphate 18 mmol/ 

Magnesium Sulfate 8 meq/Calcium Gluconate 15 meq/ Multivitamins 10 ml/Chromium/ 

Copper/Manganese/ Seleni/Zn 0.5 ml/ Total Parenteral Nutrition/Amino 

Acids/Dextrose/ Fat Emulsion Intravenous 1,400 ml @  58.333 mls/ hr TPN  CONT IV

 Last administered on 3/26/20at 22:00;  Start 3/26/20 at 22:00;  Stop 3/27/20 at

21:59;  Status DC


Albumin Human 500 ml @  125 mls/hr 1X  ONCE IV ;  Start 3/26/20 at 14:15;  Stop 

3/26/20 at 18:14;  Status DC


Sodium Chloride 90 meq/Potassium Chloride 15 meq/ Potassium Phosphate 18 mmol/ 

Magnesium Sulfate 8 meq/Calcium Gluconate 15 meq/ Multivitamins 10 ml/Chromium/ 

Copper/Manganese/ Seleni/Zn 0.5 ml/ Insulin Human Regular 10 unit/ Total 

Parenteral Nutrition/Amino Acids/Dextrose/ Fat Emulsion Intravenous 1,400 ml @  

58.333 mls/ hr TPN  CONT IV  Last administered on 3/27/20at 21:43;  Start 3/27

/20 at 22:00;  Stop 3/28/20 at 21:59;  Status DC


Lidocaine HCl (Buffered Lidocaine 1%) 3 ml STK-MED ONCE .ROUTE ;  Start 3/25/20 

at 10:00;  Stop 3/27/20 at 13:57;  Status DC


Midazolam HCl 100 mg/Sodium Chloride 100 ml @ 7 mls/hr CONT  PRN IV SEE PROTOCOL

Last administered on 4/8/20at 15:35;  Start 3/28/20 at 16:00;  Stop 6/3/20 at 

14:38;  Status DC


Sodium Chloride 90 meq/Potassium Chloride 15 meq/ Potassium Phosphate 18 mmol/ 

Magnesium Sulfate 8 meq/Calcium Gluconate 15 meq/ Multivitamins 10 ml/Chromium/ 

Copper/Manganese/ Seleni/Zn 0.5 ml/ Insulin Human Regular 15 unit/ Total 

Parenteral Nutrition/Amino Acids/Dextrose/ Fat Emulsion Intravenous 1,400 ml @  

58.333 mls/ hr TPN  CONT IV  Last administered on 3/28/20at 20:34;  Start 

3/28/20 at 22:00;  Stop 3/29/20 at 21:59;  Status DC


Info ([facility] Electrolyte Protocol) 1 ea CONT PRN  PRN MC PER PROTOCOL;  Start 

3/29/20 at 13:15


Sodium Chloride 90 meq/Potassium Chloride 15 meq/ Potassium Phosphate 18 mmol/ 

Magnesium Sulfate 8 meq/Calcium Gluconate 15 meq/ Multivitamins 10 ml/Chromium/ 

Copper/Manganese/ Seleni/Zn 0.5 ml/ Insulin Human Regular 15 unit/ Total 

Parenteral Nutrition/Amino Acids/Dextrose/ Fat Emulsion Intravenous 1,400 ml @  

58.333 mls/ hr TPN  CONT IV  Last administered on 3/29/20at 22:05;  Start 

3/29/20 at 22:00;  Stop 3/30/20 at 21:59;  Status DC


Potassium Chloride 15 meq/ Bicarbonate Dialysis Soln w/ out KCl 5,007.5 ml  @ 

1,000 mls/ hr Q5H1M IV  Last administered on 4/1/20at 18:14;  Start 3/29/20 at 

20:00;  Stop 4/2/20 at 13:08;  Status DC


Potassium Chloride 15 meq/ Bicarbonate Dialysis Soln w/ out KCl 5,007.5 ml  @ 

1,000 mls/ hr Q5H1M IV  Last administered on 4/1/20at 18:14;  Start 3/29/20 at 

20:00;  Stop 4/2/20 at 13:08;  Status DC


Potassium Chloride 15 meq/ Bicarbonate Dialysis Soln w/ out KCl 5,007.5 ml  @ 

1,000 mls/ hr Q5H1M IV  Last administered on 4/1/20at 18:14;  Start 3/29/20 at 

20:00;  Stop 4/2/20 at 13:08;  Status DC


Iohexol (Omnipaque 240 Mg/ml) 30 ml 1X  ONCE PO  Last administered on 3/30/20at 

11:30;  Start 3/30/20 at 11:30;  Stop 3/30/20 at 11:33;  Status DC


Info (CONTRAST GIVEN -- Rx MONITORING) 1 each PRN DAILY  PRN MC SEE COMMENTS;  

Start 3/30/20 at 11:45;  Stop 4/1/20 at 11:44;  Status DC


Sodium Chloride 90 meq/Potassium Chloride 15 meq/ Potassium Phosphate 18 mmol/ 

Magnesium Sulfate 8 meq/Calcium Gluconate 15 meq/ Multivitamins 10 ml/Chromium/ 

Copper/Manganese/ Seleni/Zn 0.5 ml/ Insulin Human Regular 15 unit/ Total P

arenteral Nutrition/Amino Acids/Dextrose/ Fat Emulsion Intravenous 1,400 ml @  

58.333 mls/ hr TPN  CONT IV  Last administered on 3/30/20at 21:47;  Start 

3/30/20 at 22:00;  Stop 3/31/20 at 21:59;  Status DC


Sodium Chloride 90 meq/Potassium Chloride 15 meq/ Potassium Phosphate 18 mmol/ 

Magnesium Sulfate 8 meq/Calcium Gluconate 15 meq/ Multivitamins 10 ml/Chromium/ 

Copper/Manganese/ Seleni/Zn 0.5 ml/ Insulin Human Regular 20 unit/ Total 

Parenteral Nutrition/Amino Acids/Dextrose/ Fat Emulsion Intravenous 1,400 ml @  

58.333 mls/ hr TPN  CONT IV  Last administered on 3/31/20at 21:36;  Start 

3/31/20 at 22:00;  Stop 4/1/20 at 21:59;  Status DC


Alteplase, Recombinant (Cathflo For Central Catheter Clearance) 1 mg 1X  ONCE 

INT CAT  Last administered on 3/31/20at 20:03;  Start 3/31/20 at 19:30;  Stop 

3/31/20 at 19:46;  Status DC


Alteplase, Recombinant (Cathflo For Central Catheter Clearance) 1 mg 1X  ONCE 

INT CAT  Last administered on 3/31/20at 22:05;  Start 3/31/20 at 22:00;  Stop 

3/31/20 at 22:01;  Status DC


Sodium Chloride 90 meq/Potassium Chloride 15 meq/ Potassium Phosphate 18 mmol/ 

Magnesium Sulfate 8 meq/Calcium Gluconate 15 meq/ Multivitamins 10 ml/Chromium/ 

Copper/Manganese/ Seleni/Zn 0.5 ml/ Insulin Human Regular 20 unit/ Total 

Parenteral Nutrition/Amino Acids/Dextrose/ Fat Emulsion Intravenous 1,400 ml @  

58.333 mls/ hr TPN  CONT IV  Last administered on 4/1/20at 21:30;  Start 4/1/20 

at 22:00;  Stop 4/2/20 at 21:59;  Status DC


Dexmedetomidine HCl 400 mcg/ Sodium Chloride 100 ml @ 0 mls/hr CONT  PRN IV 

ANXIETY / AGITATION Last administered on 5/30/20at 12:57;  Start 4/2/20 at 

08:15;  Stop 5/30/20 at 18:31;  Status DC


Sodium Chloride 500 ml @  500 mls/hr 1X PRN  PRN IV ELEVATED BP, SEE COMMENTS;  

Start 4/2/20 at 08:15


Atropine Sulfate (ATROPINE 0.5mg SYRINGE) 0.5 mg PRN Q5MIN  PRN IV SEE COMMENTS;

 Start 4/2/20 at 08:15


Furosemide (Lasix) 20 mg 1X  ONCE IVP  Last administered on 4/2/20at 08:19;  

Start 4/2/20 at 08:15;  Stop 4/2/20 at 08:16;  Status DC


Lidocaine HCl (Buffered Lidocaine 1%) 3 ml STK-MED ONCE .ROUTE ;  Start 4/2/20 

at 08:39;  Stop 4/2/20 at 08:39;  Status DC


Lidocaine HCl (Buffered Lidocaine 1%) 6 ml 1X  ONCE INJ  Last administered on 

4/2/20at 09:05;  Start 4/2/20 at 09:00;  Stop 4/2/20 at 09:06;  Status DC


Sodium Chloride 90 meq/Potassium Chloride 15 meq/ Potassium Phosphate 18 mmol/ 

Magnesium Sulfate 8 meq/Calcium Gluconate 15 meq/ Multivitamins 10 ml/Chromium/ 

Copper/Manganese/ Seleni/Zn 0.5 ml/ Insulin Human Regular 20 unit/ Total 

Parenteral Nutrition/Amino Acids/Dextrose/ Fat Emulsion Intravenous 1,400 ml @  

58.333 mls/ hr TPN  CONT IV  Last administered on 4/2/20at 22:45;  Start 4/2/20 

at 22:00;  Stop 4/3/20 at 21:59;  Status DC


Sodium Chloride 1,000 ml @  1,000 mls/hr Q1H PRN IV hypotension;  Start 4/3/20 

at 07:30;  Stop 4/3/20 at 13:29;  Status DC


Albumin Human 200 ml @  200 mls/hr 1X PRN  PRN IV Hypotension Last administered 

on 4/3/20at 09:36;  Start 4/3/20 at 07:30;  Stop 4/3/20 at 13:29;  Status DC


Sodium Chloride (Normal Saline Flush) 10 ml 1X PRN  PRN IV AP catheter pack;  

Start 4/3/20 at 07:30;  Stop 4/3/20 at 21:29;  Status DC


Sodium Chloride (Normal Saline Flush) 10 ml 1X PRN  PRN IV  catheter pack;  

Start 4/3/20 at 07:30;  Stop 4/4/20 at 07:29;  Status DC


Sodium Chloride 1,000 ml @  400 mls/hr Q2H30M PRN IV PATENCY;  Start 4/3/20 at 

07:30;  Stop 4/3/20 at 19:29;  Status DC


Info (PHARMACY MONITORING -- do not chart) 1 each PRN DAILY  PRN MC SEE 

COMMENTS;  Start 4/3/20 at 07:30;  Stop 4/3/20 at 13:02;  Status DC


Info (PHARMACY MONITORING -- do not chart) 1 each PRN DAILY  PRN MC SEE COMMENTS

;  Start 4/3/20 at 07:30;  Stop 4/5/20 at 12:45;  Status DC


Sodium Chloride 90 meq/Potassium Chloride 15 meq/ Potassium Phosphate 10 mmol/ 

Magnesium Sulfate 8 meq/Calcium Gluconate 15 meq/ Multivitamins 10 ml/Chromium/ 

Copper/Manganese/ Seleni/Zn 0.5 ml/ Insulin Human Regular 25 unit/ Total 

Parenteral Nutrition/Amino Acids/Dextrose/ Fat Emulsion Intravenous 1,400 ml @  

58.333 mls/ hr TPN  CONT IV  Last administered on 4/3/20at 22:19;  Start 4/3/20 

at 22:00;  Stop 4/4/20 at 21:59;  Status DC


Heparin Sodium (Porcine) (Heparin Sodium) 5,000 unit Q12HR SQ  Last administered

on 4/26/20at 08:59;  Start 4/3/20 at 21:00;  Stop 4/26/20 at 10:05;  Status DC


Ondansetron HCl (Zofran) 4 mg PRN Q6HRS  PRN IV NAUSEA/VOMITING;  Start 4/6/20 

at 07:00;  Stop 4/7/20 at 06:59;  Status DC


Fentanyl Citrate (Fentanyl 2ml Vial) 25 mcg PRN Q5MIN  PRN IV MILD PAIN 1-3;  

Start 4/6/20 at 07:00;  Stop 4/7/20 at 06:59;  Status DC


Fentanyl Citrate (Fentanyl 2ml Vial) 50 mcg PRN Q5MIN  PRN IV MODERATE TO SEVERE

PAIN;  Start 4/6/20 at 07:00;  Stop 4/7/20 at 06:59;  Status DC


Ringer's Solution 1,000 ml @  30 mls/hr Q24H IV ;  Start 4/6/20 at 07:00;  Stop 

4/6/20 at 18:59;  Status DC


Lidocaine HCl (Xylocaine-Mpf 1% 2ml Vial) 2 ml PRN 1X  PRN ID PRIOR TO IV START;

 Start 4/6/20 at 07:00;  Stop 4/7/20 at 06:59;  Status DC


Prochlorperazine Edisylate (Compazine) 5 mg PACU PRN  PRN IV NAUSEA, MRX1;  

Start 4/6/20 at 07:00;  Stop 4/7/20 at 06:59;  Status DC


Sodium Chloride 1,000 ml @  1,000 mls/hr Q1H PRN IV hypotension;  Start 4/4/20 

at 09:10;  Stop 4/4/20 at 15:09;  Status DC


Albumin Human 200 ml @  200 mls/hr 1X PRN  PRN IV Hypotension Last administered 

on 4/4/20at 10:10;  Start 4/4/20 at 09:15;  Stop 4/4/20 at 15:14;  Status DC


Sodium Chloride 1,000 ml @  400 mls/hr Q2H30M PRN IV PATENCY;  Start 4/4/20 at 

09:10;  Stop 4/4/20 at 21:09;  Status DC


Info (PHARMACY MONITORING -- do not chart) 1 each PRN DAILY  PRN MC SEE 

COMMENTS;  Start 4/4/20 at 09:15;  Stop 4/5/20 at 12:45;  Status DC


Info (PHARMACY MONITORING -- do not chart) 1 each PRN DAILY  PRN MC SEE 

COMMENTS;  Start 4/4/20 at 09:15;  Stop 4/5/20 at 12:45;  Status DC


Sodium Chloride 90 meq/Potassium Chloride 15 meq/ Potassium Phosphate 10 mmol/ 

Magnesium Sulfate 8 meq/Calcium Gluconate 15 meq/ Multivitamins 10 ml/Chromium/ 

Copper/Manganese/ Seleni/Zn 0.5 ml/ Insulin Human Regular 25 unit/ Total 

Parenteral Nutrition/Amino Acids/Dextrose/ Fat Emulsion Intravenous 1,400 ml @  

58.333 mls/ hr TPN  CONT IV  Last administered on 4/4/20at 22:10;  Start 4/4/20 

at 22:00;  Stop 4/5/20 at 21:59;  Status DC


Magnesium Sulfate 50 ml @ 25 mls/hr PRN DAILY  PRN IV for Mag < 1.7 on am labs 

Last administered on 4/20/20at 17:27;  Start 4/5/20 at 09:15


Sodium Chloride 90 meq/Potassium Chloride 15 meq/ Potassium Phosphate 10 mmol/ 

Magnesium Sulfate 8 meq/Calcium Gluconate 15 meq/ Multivitamins 10 ml/Chromium/ 

Copper/Manganese/ Seleni/Zn 0.5 ml/ Insulin Human Regular 25 unit/ Total 

Parenteral Nutrition/Amino Acids/Dextrose/ Fat Emulsion Intravenous 1,400 ml @  

58.333 mls/ hr TPN  CONT IV  Last administered on 4/5/20at 21:20;  Start 4/5/20 

at 22:00;  Stop 4/6/20 at 21:59;  Status DC


Sodium Chloride 1,000 ml @  1,000 mls/hr Q1H PRN IV hypotension;  Start 4/5/20 

at 12:23;  Stop 4/5/20 at 18:22;  Status DC


Albumin Human 200 ml @  200 mls/hr 1X  ONCE IV  Last administered on 4/5/20at 

13:34;  Start 4/5/20 at 12:30;  Stop 4/5/20 at 13:29;  Status DC


Diphenhydramine HCl (Benadryl) 25 mg 1X PRN  PRN IV ITCHING;  Start 4/5/20 at 

12:30;  Stop 4/6/20 at 12:29;  Status DC


Diphenhydramine HCl (Benadryl) 25 mg 1X PRN  PRN IV ITCHING;  Start 4/5/20 at 

12:30;  Stop 4/6/20 at 12:29;  Status DC


Info (PHARMACY MONITORING -- do not chart) 1 each PRN DAILY  PRN MC SEE COMME

NTS;  Start 4/5/20 at 12:30;  Status Cancel


Bupivacaine HCl/ Epinephrine Bitart (Sensorcain-Epi 0.5%-1:785362 Mpf) 30 ml 

STK-MED ONCE .ROUTE  Last administered on 4/6/20at 11:44;  Start 4/6/20 at 

11:00;  Stop 4/6/20 at 11:01;  Status DC


Cellulose (Surgicel Fibrillar 1x2) 1 each STK-MED ONCE .ROUTE ;  Start 4/6/20 at

11:00;  Stop 4/6/20 at 11:01;  Status DC


Sodium Chloride 90 meq/Potassium Chloride 15 meq/ Potassium Phosphate 10 mmol/ 

Magnesium Sulfate 12 meq/Calcium Gluconate 15 meq/ Multivitamins 10 ml/Chromium/

Copper/Manganese/ Seleni/Zn 0.5 ml/ Insulin Human Regular 25 unit/ Total 

Parenteral Nutrition/Amino Acids/Dextrose/ Fat Emulsion Intravenous 1,400 ml @  

58.333 mls/ hr TPN  CONT IV  Last administered on 4/6/20at 22:24;  Start 4/6/20 

at 22:00;  Stop 4/7/20 at 21:59;  Status DC


Propofol 20 ml @ As Directed STK-MED ONCE IV ;  Start 4/6/20 at 11:07;  Stop 

4/6/20 at 11:07;  Status DC


Cellulose (Surgicel Hemostat 4x8) 1 each STK-MED ONCE .ROUTE  Last administered 

on 4/6/20at 11:44;  Start 4/6/20 at 11:55;  Stop 4/6/20 at 11:56;  Status DC


Sevoflurane (Ultane) 60 ml STK-MED ONCE IH ;  Start 4/6/20 at 12:46;  Stop 

4/6/20 at 12:46;  Status DC


Sodium Chloride 1,000 ml @  1,000 mls/hr Q1H PRN IV hypotension;  Start 4/6/20 

at 13:51;  Stop 4/6/20 at 19:50;  Status DC


Albumin Human 200 ml @  200 mls/hr 1X PRN  PRN IV Hypotension Last administered 

on 4/6/20at 14:51;  Start 4/6/20 at 14:00;  Stop 4/6/20 at 19:59;  Status DC


Diphenhydramine HCl (Benadryl) 25 mg 1X PRN  PRN IV ITCHING;  Start 4/6/20 at 

14:00;  Stop 4/7/20 at 13:59;  Status DC


Diphenhydramine HCl (Benadryl) 25 mg 1X PRN  PRN IV ITCHING;  Start 4/6/20 at 

14:00;  Stop 4/7/20 at 13:59;  Status DC


Sodium Chloride 1,000 ml @  400 mls/hr Q2H30M PRN IV PATENCY;  Start 4/6/20 at 

13:51;  Stop 4/7/20 at 01:50;  Status DC


Info (PHARMACY MONITORING -- do not chart) 1 each PRN DAILY  PRN MC SEE 

COMMENTS;  Start 4/6/20 at 14:00;  Stop 4/9/20 at 08:16;  Status DC


Heparin Sodium (Porcine) (Hep Lock Adult) 500 unit STK-MED ONCE IVP ;  Start 

4/7/20 at 09:29;  Stop 4/7/20 at 09:30;  Status DC


Sodium Chloride 1,000 ml @  1,000 mls/hr Q1H PRN IV hypotension;  Start 4/7/20 

at 10:43;  Stop 4/7/20 at 16:42;  Status DC


Sodium Chloride 1,000 ml @  400 mls/hr Q2H30M PRN IV PATENCY;  Start 4/7/20 at 

10:43;  Stop 4/7/20 at 22:42;  Status DC


Info (PHARMACY MONITORING -- do not chart) 1 each PRN DAILY  PRN MC SEE 

COMMENTS;  Start 4/7/20 at 10:45;  Status UNV


Info (PHARMACY MONITORING -- do not chart) 1 each PRN DAILY  PRN MC SEE 

COMMENTS;  Start 4/7/20 at 10:45;  Status UNV


Sodium Chloride 90 meq/Potassium Chloride 15 meq/ Magnesium Sulfate 12 

meq/Calcium Gluconate 15 meq/ Multivitamins 10 ml/Chromium/ Copper/Manganese/ 

Seleni/Zn 0.5 ml/ Insulin Human Regular 25 unit/ Total Parenteral 

Nutrition/Amino Acids/Dextrose/ Fat Emulsion Intravenous 1,400 ml @  58.333 mls/

hr TPN  CONT IV  Last administered on 4/7/20at 22:13;  Start 4/7/20 at 22:00;  

Stop 4/8/20 at 21:59;  Status DC


Sodium Chloride 1,000 ml @  1,000 mls/hr Q1H PRN IV hypotension;  Start 4/8/20 

at 07:50;  Stop 4/8/20 at 13:49;  Status DC


Albumin Human 200 ml @  200 mls/hr 1X  ONCE IV ;  Start 4/8/20 at 08:00;  Stop 

4/8/20 at 08:53;  Status DC


Diphenhydramine HCl (Benadryl) 25 mg 1X PRN  PRN IV ITCHING;  Start 4/8/20 at 

08:00;  Stop 4/9/20 at 07:59;  Status DC


Diphenhydramine HCl (Benadryl) 25 mg 1X PRN  PRN IV ITCHING;  Start 4/8/20 at 

08:00;  Stop 4/9/20 at 07:59;  Status DC


Info (PHARMACY MONITORING -- do not chart) 1 each PRN DAILY  PRN MC SEE 

COMMENTS;  Start 4/8/20 at 08:00;  Stop 4/9/20 at 08:16;  Status DC


Albumin Human 50 ml @ 50 mls/hr 1X  ONCE IV ;  Start 4/8/20 at 08:53;  Stop 

4/8/20 at 08:56;  Status DC


Albumin Human 200 ml @  50 mls/hr PRN 1X  PRN IV HYPOTENSION Last administered 

on 4/14/20at 11:54;  Start 4/8/20 at 09:00;  Stop 5/21/20 at 11:14;  Status DC


Meropenem 500 mg/ Sodium Chloride 50 ml @  100 mls/hr Q12H IV  Last administered

on 4/28/20at 10:45;  Start 4/8/20 at 10:00;  Stop 4/28/20 at 12:37;  Status DC


Sodium Chloride 90 meq/Magnesium Sulfate 12 meq/ Calcium Gluconate 15 meq/ 

Multivitamins 10 ml/Chromium/ Copper/Manganese/ Seleni/Zn 0.5 ml/ Insulin Human 

Regular 25 unit/ Total Parenteral Nutrition/Amino Acids/Dextrose/ Fat Emulsion 

Intravenous 1,400 ml @  58.333 mls/ hr TPN  CONT IV  Last administered on 

4/8/20at 21:41;  Start 4/8/20 at 22:00;  Stop 4/9/20 at 21:59;  Status DC


Sodium Chloride 1,000 ml @  1,000 mls/hr Q1H PRN IV hypotension;  Start 4/9/20 

at 07:58;  Stop 4/9/20 at 13:57;  Status DC


Albumin Human 200 ml @  200 mls/hr 1X PRN  PRN IV Hypotension Last administered 

on 4/9/20at 09:30;  Start 4/9/20 at 08:00;  Stop 4/9/20 at 13:59;  Status DC


Sodium Chloride 1,000 ml @  400 mls/hr Q2H30M PRN IV PATENCY;  Start 4/9/20 at 

07:58;  Stop 4/9/20 at 19:57;  Status DC


Info (PHARMACY MONITORING -- do not chart) 1 each PRN DAILY  PRN MC SEE 

COMMENTS;  Start 4/9/20 at 08:00;  Status Cancel


Info (PHARMACY MONITORING -- do not chart) 1 each PRN DAILY  PRN MC SEE 

COMMENTS;  Start 4/9/20 at 08:15;  Status UNV


Sodium Chloride 90 meq/Potassium Phosphate 5 mmol/ Magnesium Sulfate 12 

meq/Calcium Gluconate 15 meq/ Multivitamins 10 ml/Chromium/ Copper/Manganese/ 

Seleni/Zn 0.5 ml/ Insulin Human Regular 30 unit/ Total Parenteral 

Nutrition/Amino Acids/Dextrose/ Fat Emulsion Intravenous 1,400 ml @  58.333 mls/

hr TPN  CONT IV  Last administered on 4/9/20at 22:08;  Start 4/9/20 at 22:00;  

Stop 4/10/20 at 21:59;  Status DC


Linezolid/Dextrose 300 ml @  300 mls/hr Q12HR IV  Last administered on 4/20/20at

20:40;  Start 4/10/20 at 11:00;  Stop 4/21/20 at 08:10;  Status DC


Sodium Chloride 90 meq/Potassium Phosphate 15 mmol/ Magnesium Sulfate 12 

meq/Calcium Gluconate 15 meq/ Multivitamins 10 ml/Chromium/ Copper/Manganese/ 

Seleni/Zn 0.5 ml/ Insulin Human Regular 30 unit/ Total Parenteral 

Nutrition/Amino Acids/Dextrose/ Fat Emulsion Intravenous 1,400 ml @  58.333 mls/

hr TPN  CONT IV  Last administered on 4/10/20at 21:49;  Start 4/10/20 at 22:00; 

Stop 4/11/20 at 21:59;  Status DC


Sodium Chloride 90 meq/Potassium Phosphate 15 mmol/ Magnesium Sulfate 12 

meq/Calcium Gluconate 15 meq/ Multivitamins 10 ml/Chromium/ Copper/Manganese/ 

Seleni/Zn 0.5 ml/ Insulin Human Regular 40 unit/ Total Parenteral 

Nutrition/Amino Acids/Dextrose/ Fat Emulsion Intravenous 1,400 ml @  58.333 mls/

hr TPN  CONT IV  Last administered on 4/11/20at 21:21;  Start 4/11/20 at 22:00; 

Stop 4/12/20 at 21:59;  Status DC


Sodium Chloride 1,000 ml @  1,000 mls/hr Q1H PRN IV hypotension;  Start 4/11/20 

at 13:26;  Stop 4/11/20 at 19:25;  Status DC


Albumin Human 200 ml @  200 mls/hr 1X PRN  PRN IV Hypotension Last administered 

on 4/11/20at 15:00;  Start 4/11/20 at 13:30;  Stop 4/11/20 at 19:29;  Status DC


Sodium Chloride (Normal Saline Flush) 10 ml 1X PRN  PRN IV AP catheter pack;  

Start 4/11/20 at 13:30;  Stop 4/12/20 at 13:29;  Status DC


Sodium Chloride (Normal Saline Flush) 10 ml 1X PRN  PRN IV  catheter pack;  

Start 4/11/20 at 13:30;  Stop 4/12/20 at 13:29;  Status DC


Sodium Chloride 1,000 ml @  400 mls/hr Q2H30M PRN IV PATENCY;  Start 4/11/20 at 

13:26;  Stop 4/12/20 at 01:25;  Status DC


Info (PHARMACY MONITORING -- do not chart) 1 each PRN DAILY  PRN MC SEE 

COMMENTS;  Start 4/11/20 at 13:30;  Stop 4/11/20 at 13:33;  Status DC


Info (PHARMACY MONITORING -- do not chart) 1 each PRN DAILY  PRN MC SEE 

COMMENTS;  Start 4/11/20 at 13:30;  Stop 4/11/20 at 13:34;  Status DC


Sodium Chloride 90 meq/Potassium Phosphate 19 mmol/ Magnesium Sulfate 12 

meq/Calcium Gluconate 15 meq/ Multivitamins 10 ml/Chromium/ Copper/Manganese/ 

Seleni/Zn 0.5 ml/ Insulin Human Regular 40 unit/ Total Parenteral 

Nutrition/Amino Acids/Dextrose/ Fat Emulsion Intravenous 1,400 ml @  58.333 mls/

hr TPN  CONT IV  Last administered on 4/12/20at 21:54;  Start 4/12/20 at 22:00; 

Stop 4/13/20 at 21:59;  Status DC


Sodium Chloride 1,000 ml @  1,000 mls/hr Q1H PRN IV hypotension;  Start 4/13/20 

at 09:35;  Stop 4/13/20 at 15:34;  Status DC


Albumin Human 200 ml @  200 mls/hr 1X PRN  PRN IV Hypotension;  Start 4/13/20 at

09:45;  Stop 4/13/20 at 15:44;  Status DC


Diphenhydramine HCl (Benadryl) 25 mg 1X PRN  PRN IV ITCHING;  Start 4/13/20 at 

09:45;  Stop 4/14/20 at 09:44;  Status DC


Diphenhydramine HCl (Benadryl) 25 mg 1X PRN  PRN IV ITCHING;  Start 4/13/20 at 

09:45;  Stop 4/14/20 at 09:44;  Status DC


Sodium Chloride 1,000 ml @  400 mls/hr Q2H30M PRN IV PATENCY;  Start 4/13/20 at 

09:35;  Stop 4/13/20 at 21:34;  Status DC


Info (PHARMACY MONITORING -- do not chart) 1 each PRN DAILY  PRN MC SEE 

COMMENTS;  Start 4/13/20 at 09:45;  Status Cancel


Sodium Chloride 100 meq/Potassium Phosphate 19 mmol/ Magnesium Sulfate 12 m

eq/Calcium Gluconate 15 meq/ Multivitamins 10 ml/Chromium/ Copper/Manganese/ 

Seleni/Zn 0.5 ml/ Insulin Human Regular 40 unit/ Potassium Chloride 20 meq/ 

Total Parenteral Nutrition/Amino Acids/Dextrose/ Fat Emulsion Intravenous 1,400 

ml @  58.333 mls/ hr TPN  CONT IV  Last administered on 4/13/20at 22:02;  Start 

4/13/20 at 22:00;  Stop 4/14/20 at 21:59;  Status DC


Furosemide (Lasix) 40 mg 1X  ONCE IVP  Last administered on 4/13/20at 14:39;  

Start 4/13/20 at 14:30;  Stop 4/13/20 at 14:31;  Status DC


Metronidazole 100 ml @  100 mls/hr Q8HRS IV  Last administered on 4/21/20at 

06:04;  Start 4/14/20 at 10:00;  Stop 4/21/20 at 08:10;  Status DC


Sodium Chloride 1,000 ml @  1,000 mls/hr Q1H PRN IV hypotension;  Start 4/14/20 

at 08:00;  Stop 4/14/20 at 13:59;  Status DC


Albumin Human 200 ml @  200 mls/hr 1X PRN  PRN IV Hypotension;  Start 4/14/20 at

08:00;  Stop 4/14/20 at 13:59;  Status DC


Sodium Chloride 1,000 ml @  400 mls/hr Q2H30M PRN IV PATENCY;  Start 4/14/20 at 

08:00;  Stop 4/14/20 at 19:59;  Status DC


Info (PHARMACY MONITORING -- do not chart) 1 each PRN DAILY  PRN MC SEE 

COMMENTS;  Start 4/14/20 at 11:30;  Status UNV


Info (PHARMACY MONITORING -- do not chart) 1 each PRN DAILY  PRN MC SEE 

COMMENTS;  Start 4/14/20 at 11:30;  Stop 4/16/20 at 12:13;  Status DC


Sodium Chloride 100 meq/Potassium Phosphate 19 mmol/ Magnesium Sulfate 12 

meq/Calcium Gluconate 15 meq/ Multivitamins 10 ml/Chromium/ Copper/Manganese/ 

Seleni/Zn 0.5 ml/ Insulin Human Regular 40 unit/ Potassium Chloride 20 meq/ 

Total Parenteral Nutrition/Amino Acids/Dextrose/ Fat Emulsion Intravenous 1,400 

ml @  58.333 mls/ hr TPN  CONT IV  Last administered on 4/14/20at 21:52;  Start 

4/14/20 at 22:00;  Stop 4/15/20 at 21:59;  Status DC


Sodium Chloride (Normal Saline Flush) 10 ml QSHIFT  PRN IV AFTER MEDS AND BLOOD 

DRAWS;  Start 4/14/20 at 15:00;  Stop 5/12/20 at 11:27;  Status DC


Sodium Chloride (Normal Saline Flush) 10 ml PRN Q5MIN  PRN IV AFTER MEDS AND 

BLOOD DRAWS;  Start 4/14/20 at 15:00


Sodium Chloride (Normal Saline Flush) 20 ml PRN Q5MIN  PRN IV AFTER MEDS AND 

BLOOD DRAWS;  Start 4/14/20 at 15:00


Sodium Chloride 100 meq/Potassium Phosphate 19 mmol/ Magnesium Sulfate 12 

meq/Calcium Gluconate 15 meq/ Multivitamins 10 ml/Chromium/ Copper/Manganese/ 

Seleni/Zn 0.5 ml/ Insulin Human Regular 40 unit/ Potassium Chloride 20 meq/ 

Total Parenteral Nutrition/Amino Acids/Dextrose/ Fat Emulsion Intravenous 1,400 

ml @  58.333 mls/ hr TPN  CONT IV  Last administered on 4/15/20at 21:20;  Start 

4/15/20 at 22:00;  Stop 4/16/20 at 21:59;  Status DC


Lidocaine HCl (Buffered Lidocaine 1%) 3 ml STK-MED ONCE .ROUTE ;  Start 4/15/20 

at 13:16;  Stop 4/15/20 at 13:16;  Status DC


Lidocaine HCl (Buffered Lidocaine 1%) 6 ml 1X  ONCE INJ  Last administered on 

4/15/20at 13:45;  Start 4/15/20 at 13:30;  Stop 4/15/20 at 13:31;  Status DC


Albumin Human 100 ml @  100 mls/hr 1X  ONCE IV  Last administered on 4/15/20at 

15:41;  Start 4/15/20 at 15:00;  Stop 4/15/20 at 15:59;  Status DC


Albumin Human 50 ml @ 50 mls/hr 1X  ONCE IV  Last administered on 4/15/20at 

15:00;  Start 4/15/20 at 15:00;  Stop 4/15/20 at 15:59;  Status DC


Info (PHARMACY MONITORING -- do not chart) 1 each PRN DAILY  PRN MC SEE 

COMMENTS;  Start 4/16/20 at 11:30;  Status Cancel


Info (PHARMACY MONITORING -- do not chart) 1 each PRN DAILY  PRN MC SEE 

COMMENTS;  Start 4/16/20 at 11:30;  Status UNV


Sodium Chloride 100 meq/Potassium Phosphate 10 mmol/ Magnesium Sulfate 12 

meq/Calcium Gluconate 15 meq/ Multivitamins 10 ml/Chromium/ Copper/Manganese/ 

Seleni/Zn 0.5 ml/ Insulin Human Regular 35 unit/ Potassium Chloride 20 meq/ 

Total Parenteral Nutrition/Amino Acids/Dextrose/ Fat Emulsion Intravenous 1,400 

ml @  58.333 mls/ hr TPN  CONT IV  Last administered on 4/16/20at 22:10;  Start 

4/16/20 at 22:00;  Stop 4/17/20 at 21:59;  Status DC


Sodium Chloride 100 meq/Potassium Phosphate 5 mmol/ Magnesium Sulfate 12 

meq/Calcium Gluconate 15 meq/ Multivitamins 10 ml/Chromium/ Copper/Manganese/ 

Seleni/Zn 0.5 ml/ Insulin Human Regular 35 unit/ Potassium Chloride 20 meq/ 

Total Parenteral Nutrition/Amino Acids/Dextrose/ Fat Emulsion Intravenous 1,400 

ml @  58.333 mls/ hr TPN  CONT IV  Last administered on 4/17/20at 22:59;  Start 

4/17/20 at 22:00;  Stop 4/18/20 at 21:59;  Status DC


Sodium Chloride 1,000 ml @  1,000 mls/hr Q1H PRN IV hypotension;  Start 4/18/20 

at 08:27;  Stop 4/18/20 at 14:26;  Status DC


Albumin Human 200 ml @  200 mls/hr 1X PRN  PRN IV Hypotension Last administered 

on 4/18/20at 09:18;  Start 4/18/20 at 08:30;  Stop 4/18/20 at 14:29;  Status DC


Sodium Chloride 1,000 ml @  400 mls/hr Q2H30M PRN IV PATENCY;  Start 4/18/20 at 

08:27;  Stop 4/18/20 at 20:26;  Status DC


Info (PHARMACY MONITORING -- do not chart) 1 each PRN DAILY  PRN MC SEE 

COMMENTS;  Start 4/18/20 at 08:30;  Status Cancel


Info (PHARMACY MONITORING -- do not chart) 1 each PRN DAILY  PRN MC SEE 

COMMENTS;  Start 4/18/20 at 08:30;  Stop 4/26/20 at 13:10;  Status DC


Sodium Chloride 100 meq/Potassium Chloride 40 meq/ Magnesium Sulfate 15 

meq/Calcium Gluconate 15 meq/ Multivitamins 10 ml/Chromium/ Copper/Manganese/ 

Seleni/Zn 0.5 ml/ Insulin Human Regular 35 unit/ Total Parenteral Nutritio

n/Amino Acids/Dextrose/ Fat Emulsion Intravenous 1,400 ml @  58.333 mls/ hr TPN 

CONT IV  Last administered on 4/18/20at 22:00;  Start 4/18/20 at 22:00;  Stop 

4/19/20 at 21:59;  Status DC


Potassium Chloride/Water 100 ml @  100 mls/hr 1X  ONCE IV  Last administered on 

4/18/20at 17:28;  Start 4/18/20 at 14:45;  Stop 4/18/20 at 15:44;  Status DC


Sodium Chloride 100 meq/Potassium Chloride 40 meq/ Magnesium Sulfate 15 

meq/Calcium Gluconate 15 meq/ Multivitamins 10 ml/Chromium/ Copper/Manganese/ 

Seleni/Zn 0.5 ml/ Insulin Human Regular 35 unit/ Total Parenteral 

Nutrition/Amino Acids/Dextrose/ Fat Emulsion Intravenous 1,400 ml @  58.333 mls/

hr TPN  CONT IV  Last administered on 4/19/20at 22:46;  Start 4/19/20 at 22:00; 

Stop 4/20/20 at 21:59;  Status DC


Sodium Chloride 100 meq/Potassium Chloride 40 meq/ Magnesium Sulfate 20 

meq/Calcium Gluconate 15 meq/ Multivitamins 10 ml/Chromium/ Copper/Manganese/ 

Seleni/Zn 0.5 ml/ Insulin Human Regular 35 unit/ Total Parenteral 

Nutrition/Amino Acids/Dextrose/ Fat Emulsion Intravenous 1,400 ml @  58.333 mls/

hr TPN  CONT IV  Last administered on 4/20/20at 22:31;  Start 4/20/20 at 22:00; 

Stop 4/21/20 at 21:59;  Status DC


Fentanyl Citrate (Fentanyl 2ml Vial) 50 mcg PRN Q2HR  PRN IVP PAIN Last 

administered on 4/27/20at 13:32;  Start 4/20/20 at 21:00;  Stop 4/28/20 at 

12:53;  Status DC


Fentanyl Citrate (Fentanyl 2ml Vial) 25 mcg PRN Q2HR  PRN IVP PAIN;  Start 

4/20/20 at 21:00;  Stop 4/28/20 at 12:54;  Status DC


Enoxaparin Sodium (Lovenox 100mg Syringe) 100 mg Q12HR SQ ;  Start 4/21/20 at 

21:00;  Status UNV


Amino Acids/ Glycerin/ Electrolytes 1,000 ml @  75 mls/hr [id number] IV ;  Start 

4/20/20 at 21:15;  Status UNV


Sodium Chloride 1,000 ml @  1,000 mls/hr Q1H PRN IV hypotension;  Start 4/21/20 

at 07:56;  Stop 4/21/20 at 13:55;  Status DC


Albumin Human 200 ml @  200 mls/hr 1X PRN  PRN IV Hypotension Last administered 

on 4/21/20at 08:40;  Start 4/21/20 at 08:00;  Stop 4/21/20 at 13:59;  Status DC


Sodium Chloride 1,000 ml @  400 mls/hr Q2H30M PRN IV PATENCY;  Start 4/21/20 at 

07:56;  Stop 4/21/20 at 19:55;  Status DC


Info (PHARMACY MONITORING -- do not chart) 1 each PRN DAILY  PRN MC SEE 

COMMENTS;  Start 4/21/20 at 08:00;  Status UNV


Info (PHARMACY MONITORING -- do not chart) 1 each PRN DAILY  PRN MC SEE 

COMMENTS;  Start 4/21/20 at 08:00;  Status UNV


Daptomycin 430 mg/ Sodium Chloride 50 ml @  100 mls/hr Q24H IV  Last 

administered on 4/21/20at 12:35;  Start 4/21/20 at 09:00;  Stop 4/21/20 at 

12:49;  Status DC


Sodium Chloride 100 meq/Potassium Chloride 40 meq/ Magnesium Sulfate 20 

meq/Calcium Gluconate 15 meq/ Multivitamins 10 ml/Chromium/ Copper/Manganese/ 

Seleni/Zn 0.5 ml/ Insulin Human Regular 35 unit/ Total Parenteral 

Nutrition/Amino Acids/Dextrose/ Fat Emulsion Intravenous 1,400 ml @  58.333 mls/

hr TPN  CONT IV  Last administered on 4/21/20at 21:26;  Start 4/21/20 at 22:00; 

Stop 4/22/20 at 21:59;  Status DC


Daptomycin 430 mg/ Sodium Chloride 50 ml @  100 mls/hr Q48H IV ;  Start 4/23/20 

at 09:00;  Stop 4/22/20 at 11:55;  Status DC


Sodium Chloride 100 meq/Potassium Chloride 40 meq/ Magnesium Sulfate 20 

meq/Calcium Gluconate 15 meq/ Multivitamins 10 ml/Chromium/ Copper/Manganese/ 

Seleni/Zn 0.5 ml/ Insulin Human Regular 35 unit/ Total Parenteral 

Nutrition/Amino Acids/Dextrose/ Fat Emulsion Intravenous 1,400 ml @  58.333 mls/

hr TPN  CONT IV  Last administered on 4/22/20at 22:27;  Start 4/22/20 at 22:00; 

Stop 4/23/20 at 21:59;  Status DC


Daptomycin 430 mg/ Sodium Chloride 50 ml @  100 mls/hr Q24H IV  Last 

administered on 4/24/20at 15:07;  Start 4/22/20 at 13:00;  Stop 4/25/20 at 

13:15;  Status DC


Sodium Chloride 100 meq/Potassium Chloride 40 meq/ Magnesium Sulfate 20 

meq/Calcium Gluconate 10 meq/ Multivitamins 10 ml/Chromium/ Copper/Manganese/ 

Seleni/Zn 0.5 ml/ Insulin Human Regular 35 unit/ Total Parenteral 

Nutrition/Amino Acids/Dextrose/ Fat Emulsion Intravenous 1,400 ml @  58.333 mls/

hr TPN  CONT IV  Last administered on 4/24/20at 00:06;  Start 4/23/20 at 22:00; 

Stop 4/24/20 at 21:59;  Status DC


Alteplase, Recombinant (Cathflo For Central Catheter Clearance) 1 mg 1X  ONCE 

INT CAT  Last administered on 4/24/20at 11:44;  Start 4/24/20 at 10:45;  Stop 

4/24/20 at 10:46;  Status DC


Ondansetron HCl (Zofran) 4 mg PRN Q6HRS  PRN IV NAUSEA/VOMITING;  Start 4/27/20 

at 07:00;  Stop 4/28/20 at 06:59;  Status DC


Fentanyl Citrate (Fentanyl 2ml Vial) 25 mcg PRN Q5MIN  PRN IV MILD PAIN 1-3;  

Start 4/27/20 at 07:00;  Stop 4/28/20 at 06:59;  Status DC


Fentanyl Citrate (Fentanyl 2ml Vial) 50 mcg PRN Q5MIN  PRN IV MODERATE TO SEVERE

PAIN Last administered on 4/27/20at 10:17;  Start 4/27/20 at 07:00;  Stop 

4/28/20 at 06:59;  Status DC


Ringer's Solution 1,000 ml @  30 mls/hr Q24H IV ;  Start 4/27/20 at 07:00;  Stop

4/27/20 at 18:59;  Status DC


Lidocaine HCl (Xylocaine-Mpf 1% 2ml Vial) 2 ml PRN 1X  PRN ID PRIOR TO IV START;

 Start 4/27/20 at 07:00;  Stop 4/28/20 at 06:59;  Status DC


Prochlorperazine Edisylate (Compazine) 5 mg PACU PRN  PRN IV NAUSEA, MRX1;  

Start 4/27/20 at 07:00;  Stop 4/28/20 at 06:59;  Status DC


Sodium Acetate 50 meq/Potassium Acetate 55 meq/ Magnesium Sulfate 20 meq/Calcium

Gluconate 10 meq/ Multivitamins 10 ml/Chromium/ Copper/Manganese/ Seleni/Zn 0.5 

ml/ Insulin Human Regular 35 unit/ Total Parenteral Nutrition/Amino Acids

/Dextrose/ Fat Emulsion Intravenous 1,400 ml @  58.333 mls/ hr TPN  CONT IV ;  

Start 4/24/20 at 22:00;  Stop 4/24/20 at 14:15;  Status DC


Sodium Acetate 50 meq/Potassium Acetate 55 meq/ Magnesium Sulfate 20 meq/Calcium

Gluconate 10 meq/ Multivitamins 10 ml/Chromium/ Copper/Manganese/ Seleni/Zn 0.5 

ml/ Insulin Human Regular 35 unit/ Total Parenteral Nutrition/Amino 

Acids/Dextrose/ Fat Emulsion Intravenous 1,800 ml @  75 mls/hr TPN  CONT IV  

Last administered on 4/24/20at 22:38;  Start 4/24/20 at 22:00;  Stop 4/25/20 at 

21:59;  Status DC


Sodium Chloride 1,000 ml @  1,000 mls/hr Q1H PRN IV hypotension;  Start 4/24/20 

at 15:31;  Stop 4/24/20 at 21:30;  Status DC


Diphenhydramine HCl (Benadryl) 25 mg 1X PRN  PRN IV ITCHING;  Start 4/24/20 at 

15:45;  Stop 4/25/20 at 15:44;  Status DC


Diphenhydramine HCl (Benadryl) 25 mg 1X PRN  PRN IV ITCHING;  Start 4/24/20 at 

15:45;  Stop 4/25/20 at 15:44;  Status DC


Sodium Chloride 1,000 ml @  400 mls/hr Q2H30M PRN IV PATENCY;  Start 4/24/20 at 

15:31;  Stop 4/25/20 at 03:30;  Status DC


Info (PHARMACY MONITORING -- do not chart) 1 each PRN DAILY  PRN MC SEE 

COMMENTS;  Start 4/24/20 at 15:45;  Stop 5/26/20 at 14:14;  Status DC


Sodium Acetate 50 meq/Potassium Acetate 55 meq/ Magnesium Sulfate 20 meq/Calcium

Gluconate 10 meq/ Multivitamins 10 ml/Chromium/ Copper/Manganese/ Seleni/Zn 0.5 

ml/ Insulin Human Regular 35 unit/ Total Parenteral Nutrition/Amino Acids/

Dextrose/ Fat Emulsion Intravenous 1,800 ml @  75 mls/hr TPN  CONT IV  Last 

administered on 4/25/20at 22:03;  Start 4/25/20 at 22:00;  Stop 4/26/20 at 

21:59;  Status DC


Daptomycin 430 mg/ Sodium Chloride 50 ml @  100 mls/hr Q24H IV  Last 

administered on 4/30/20at 13:00;  Start 4/25/20 at 13:00;  Stop 4/30/20 at 

20:58;  Status DC


Heparin Sodium (Porcine) 1000 unit/Sodium Chloride 1,001 ml @  1,001 mls/hr 1X  

ONCE IRR ;  Start 4/27/20 at 06:00;  Stop 4/27/20 at 06:59;  Status DC


Potassium Acetate 55 meq/Magnesium Sulfate 20 meq/ Calcium Gluconate 10 meq/ 

Multivitamins 10 ml/Chromium/ Copper/Manganese/ Seleni/Zn 0.5 ml/ Insulin Human 

Regular 35 unit/ Total Parenteral Nutrition/Amino Acids/Dextrose/ Fat Emulsion 

Intravenous 1,920 ml @  80 mls/hr TPN  CONT IV  Last administered on 4/26/20at 

22:10;  Start 4/26/20 at 22:00;  Stop 4/27/20 at 21:59;  Status DC


Dexamethasone Sodium Phosphate (Decadron) 4 mg STK-MED ONCE .ROUTE ;  Start 

4/27/20 at 10:56;  Stop 4/27/20 at 10:57;  Status DC


Ondansetron HCl (Zofran) 4 mg STK-MED ONCE .ROUTE ;  Start 4/27/20 at 10:56;  

Stop 4/27/20 at 10:57;  Status DC


Rocuronium Bromide (Zemuron) 50 mg STK-MED ONCE .ROUTE ;  Start 4/27/20 at 

10:56;  Stop 4/27/20 at 10:57;  Status DC


Fentanyl Citrate (Fentanyl 2ml Vial) 100 mcg STK-MED ONCE .ROUTE ;  Start 

4/27/20 at 10:56;  Stop 4/27/20 at 10:57;  Status DC


Bupivacaine HCl/ Epinephrine Bitart (Sensorcain-Epi 0.5%-1:010368 Mpf) 30 ml 

STK-MED ONCE .ROUTE  Last administered on 4/27/20at 12:01;  Start 4/27/20 at 

10:58;  Stop 4/27/20 at 10:58;  Status DC


Cellulose (Surgicel Hemostat 2x14) 1 each STK-MED ONCE .ROUTE ;  Start 4/27/20 

at 10:58;  Stop 4/27/20 at 10:59;  Status DC


Iohexol (Omnipaque 300 Mg/ml) 50 ml STK-MED ONCE .ROUTE ;  Start 4/27/20 at 

10:58;  Stop 4/27/20 at 10:59;  Status DC


Cellulose (Surgicel Hemostat 4x8) 1 each STK-MED ONCE .ROUTE ;  Start 4/27/20 at

10:58;  Stop 4/27/20 at 10:59;  Status DC


Bisacodyl (Dulcolax Supp) 10 mg STK-MED ONCE .ROUTE ;  Start 4/27/20 at 10:59;  

Stop 4/27/20 at 10:59;  Status DC


Heparin Sodium (Porcine) 1000 unit/Sodium Chloride 1,001 ml @  1,001 mls/hr 1X  

ONCE IRR ;  Start 4/27/20 at 12:00;  Stop 4/27/20 at 12:59;  Status DC


Propofol 20 ml @ As Directed STK-MED ONCE IV ;  Start 4/27/20 at 11:05;  Stop 

4/27/20 at 11:05;  Status DC


Sevoflurane (Ultane) 90 ml STK-MED ONCE IH ;  Start 4/27/20 at 11:05;  Stop 

4/27/20 at 11:05;  Status DC


Sevoflurane (Ultane) 60 ml STK-MED ONCE IH ;  Start 4/27/20 at 12:26;  Stop 

4/27/20 at 12:27;  Status DC


Propofol 20 ml @ As Directed STK-MED ONCE IV ;  Start 4/27/20 at 12:26;  Stop 

4/27/20 at 12:27;  Status DC


Phenylephrine HCl (PHENYLEPHRINE in 0.9% NACL PF) 1 mg STK-MED ONCE IV ;  Start 

4/27/20 at 12:34;  Stop 4/27/20 at 12:34;  Status DC


Heparin Sodium (Porcine) (Heparin Sodium) 5,000 unit Q12HR SQ  Last administered

on 5/6/20at 20:57;  Start 4/27/20 at 21:00;  Stop 5/7/20 at 09:59;  Status DC


Sodium Chloride (Normal Saline Flush) 3 ml QSHIFT  PRN IV AFTER MEDS AND BLOOD 

DRAWS;  Start 4/27/20 at 13:45


Naloxone HCl (Narcan) 0.4 mg PRN Q2MIN  PRN IV SEE INSTRUCTIONS Last ad

ministered on 6/6/20at 15:15;  Start 4/27/20 at 13:45


Sodium Chloride 1,000 ml @  25 mls/hr Q24H IV  Last administered on 5/26/20at 

13:37;  Start 4/27/20 at 13:37;  Stop 5/29/20 at 13:09;  Status DC


Naloxone HCl (Narcan) 0.4 mg PRN Q2MIN  PRN IV SEE INSTRUCTIONS;  Start 4/27/20 

at 14:30;  Status UNV


Sodium Chloride 1,000 ml @  25 mls/hr Q24H IV ;  Start 4/27/20 at 14:30;  Status

UNV


Hydromorphone HCl 30 ml @ 0 mls/hr CONT PRN  PRN IV PER PROTOCOL Last 

administered on 5/2/20at 16:08;  Start 4/27/20 at 14:30;  Stop 5/4/20 at 08:55; 

Status DC


Potassium Acetate 55 meq/Magnesium Sulfate 20 meq/ Calcium Gluconate 10 meq/ 

Multivitamins 10 ml/Chromium/ Copper/Manganese/ Seleni/Zn 0.5 ml/ Insulin Human 

Regular 35 unit/ Total Parenteral Nutrition/Amino Acids/Dextrose/ Fat Emulsion 

Intravenous 1,920 ml @  80 mls/hr TPN  CONT IV  Last administered on 4/27/20at 

22:01;  Start 4/27/20 at 22:00;  Stop 4/28/20 at 21:59;  Status DC


Bumetanide (Bumex) 2 mg BID92 IV  Last administered on 5/1/20at 13:50;  Start 

4/28/20 at 14:00;  Stop 5/2/20 at 14:10;  Status DC


Meropenem 1 gm/ Sodium Chloride 100 ml @  200 mls/hr Q8HRS IV  Last administered

on 5/22/20at 05:53;  Start 4/28/20 at 14:00;  Stop 5/22/20 at 09:31;  Status DC


Potassium Acetate 55 meq/Magnesium Sulfate 20 meq/ Calcium Gluconate 10 meq/ 

Multivitamins 10 ml/Chromium/ Copper/Manganese/ Seleni/Zn 0.5 ml/ Insulin Human 

Regular 35 unit/ Total Parenteral Nutrition/Amino Acids/Dextrose/ Fat Emulsion 

Intravenous 1,920 ml @  80 mls/hr TPN  CONT IV  Last administered on 4/28/20at 2

2:02;  Start 4/28/20 at 22:00;  Stop 4/29/20 at 21:59;  Status DC


Hydromorphone HCl (Dilaudid Standard PCA) 12 mg STK-MED ONCE IV ;  Start 4/27/20

at 14:35;  Stop 4/28/20 at 13:53;  Status DC


Artificial Tears (Artificial Tears) 1 drop PRN Q15MIN  PRN OU DRY EYE Last 

administered on 5/29/20at 10:08;  Start 4/29/20 at 05:30


Hydromorphone HCl (Dilaudid Standard PCA) 12 mg STK-MED ONCE IV ;  Start 4/28/20

at 12:05;  Stop 4/29/20 at 09:15;  Status DC


Potassium Acetate 65 meq/Magnesium Sulfate 20 meq/ Calcium Gluconate 10 meq/ 

Multivitamins 10 ml/Chromium/ Copper/Manganese/ Seleni/Zn 0.5 ml/ Insulin Human 

Regular 30 unit/ Total Parenteral Nutrition/Amino Acids/Dextrose/ Fat Emulsion 

Intravenous 1,920 ml @  80 mls/hr TPN  CONT IV  Last administered on 4/29/20at 

22:22;  Start 4/29/20 at 22:00;  Stop 4/30/20 at 21:59;  Status DC


Cyclobenzaprine HCl (Flexeril) 10 mg PRN Q6HRS  PRN PO MUSCLE SPASMS;  Start 

4/30/20 at 10:45


Potassium Acetate 55 meq/Magnesium Sulfate 20 meq/ Calcium Gluconate 10 meq/ 

Multivitamins 10 ml/Chromium/ Copper/Manganese/ Seleni/Zn 0.5 ml/ Insulin Human 

Regular 30 unit/ Total Parenteral Nutrition/Amino Acids/Dextrose/ Fat Emulsion 

Intravenous 1,920 ml @  80 mls/hr TPN  CONT IV  Last administered on 5/1/20at 

01:00;  Start 4/30/20 at 22:00;  Stop 5/1/20 at 21:59;  Status DC


Magnesium Sulfate 50 ml @ 25 mls/hr 1X  ONCE IV  Last administered on 4/30/20at 

17:18;  Start 4/30/20 at 12:45;  Stop 4/30/20 at 14:44;  Status DC


Potassium Chloride/Water 100 ml @  100 mls/hr 1X  ONCE IV  Last administered on 

5/1/20at 11:27;  Start 5/1/20 at 12:00;  Stop 5/1/20 at 12:59;  Status DC


Hydromorphone HCl (Dilaudid Standard PCA) 12 mg STK-MED ONCE IV ;  Start 4/29/20

at 10:50;  Stop 5/1/20 at 11:02;  Status DC


Hydromorphone HCl (Dilaudid Standard PCA) 12 mg STK-MED ONCE IV ;  Start 4/30/20

at 13:47;  Stop 5/1/20 at 11:03;  Status DC


Potassium Acetate 30 meq/Magnesium Sulfate 20 meq/ Calcium Gluconate 10 meq/ 

Multivitamins 10 ml/Chromium/ Copper/Manganese/ Seleni/Zn 0.5 ml/ Insulin Human 

Regular 30 unit/ Potassium Chloride 30 meq/ Total Parenteral Nutrition/Amino 

Acids/Dextrose/ Fat Emulsion Intravenous 1,920 ml @  80 mls/hr TPN  CONT IV  

Last administered on 5/1/20at 22:34;  Start 5/1/20 at 22:00;  Stop 5/2/20 at 

21:59;  Status DC


Potassium Chloride/Water 100 ml @  100 mls/hr Q1H IV  Last administered on 

5/2/20at 13:05;  Start 5/2/20 at 07:00;  Stop 5/2/20 at 10:59;  Status DC


Magnesium Sulfate 50 ml @ 25 mls/hr 1X  ONCE IV  Last administered on 5/2/20at 

10:34;  Start 5/2/20 at 10:30;  Stop 5/2/20 at 12:29;  Status DC


Potassium Chloride 75 meq/ Magnesium Sulfate 20 meq/Calcium Gluconate 10 meq/ 

Multivitamins 10 ml/Chromium/ Copper/Manganese/ Seleni/Zn 0.5 ml/ Insulin Human 

Regular 30 unit/ Total Parenteral Nutrition/Amino Acids/Dextrose/ Fat Emulsion 

Intravenous 1,920 ml @  80 mls/hr TPN  CONT IV  Last administered on 5/2/20at 

21:51;  Start 5/2/20 at 22:00;  Stop 5/3/20 at 22:00;  Status DC


Potassium Chloride 75 meq/ Magnesium Sulfate 20 meq/Calcium Gluconate 10 meq/ 

Multivitamins 10 ml/Chromium/ Copper/Manganese/ Seleni/Zn 0.5 ml/ Insulin Human 

Regular 25 unit/ Total Parenteral Nutrition/Amino Acids/Dextrose/ Fat Emulsion 

Intravenous 1,920 ml @  80 mls/hr TPN  CONT IV  Last administered on 5/3/20at 

22:04;  Start 5/3/20 at 22:00;  Stop 5/4/20 at 21:59;  Status DC


Hydromorphone HCl (Dilaudid) 0.4 mg PRN Q4HRS  PRN IVP PAIN Last administered on

5/4/20at 10:57;  Start 5/4/20 at 09:00;  Stop 5/4/20 at 18:59;  Status DC


Micafungin Sodium 100 mg/Dextrose 100 ml @  100 mls/hr Q24H IV  Last 

administered on 5/26/20at 12:17;  Start 5/4/20 at 11:00;  Stop 5/27/20 at 09:59;

 Status DC


Daptomycin 485 mg/ Sodium Chloride 50 ml @  100 mls/hr Q24H IV  Last administere

d on 5/11/20at 13:10;  Start 5/4/20 at 11:00;  Stop 5/12/20 at 07:44;  Status DC


Potassium Chloride 75 meq/ Magnesium Sulfate 15 meq/Calcium Gluconate 8 meq/ 

Multivitamins 10 ml/Chromium/ Copper/Manganese/ Seleni/Zn 0.5 ml/ Insulin Human 

Regular 25 unit/ Total Parenteral Nutrition/Amino Acids/Dextrose/ Fat Emulsion 

Intravenous 1,920 ml @  80 mls/hr TPN  CONT IV  Last administered on 5/4/20at 

23:08;  Start 5/4/20 at 22:00;  Stop 5/5/20 at 21:59;  Status DC


Haloperidol Lactate (Haldol Inj) 3 mg 1X  ONCE IVP  Last administered on 

5/4/20at 14:37;  Start 5/4/20 at 14:30;  Stop 5/4/20 at 14:31;  Status DC


Hydromorphone HCl (Dilaudid) 1 mg PRN Q4HRS  PRN IVP PAIN Last administered on 

5/18/20at 06:25;  Start 5/4/20 at 19:00;  Stop 5/18/20 at 17:10;  Status DC


Potassium Chloride 75 meq/ Magnesium Sulfate 15 meq/Calcium Gluconate 8 meq/ 

Multivitamins 10 ml/Chromium/ Copper/Manganese/ Seleni/Zn 0.5 ml/ Insulin Human 

Regular 20 unit/ Total Parenteral Nutrition/Amino Acids/Dextrose/ Fat Emulsion 

Intravenous 1,920 ml @  80 mls/hr TPN  CONT IV  Last administered on 5/5/20at 

22:10;  Start 5/5/20 at 22:00;  Stop 5/6/20 at 21:59;  Status DC


Lidocaine HCl (Buffered Lidocaine 1%) 3 ml STK-MED ONCE .ROUTE ;  Start 5/6/20 

at 11:31;  Stop 5/6/20 at 11:31;  Status DC


Lidocaine HCl (Buffered Lidocaine 1%) 3 ml STK-MED ONCE .ROUTE ;  Start 5/6/20 

at 12:28;  Stop 5/6/20 at 12:29;  Status DC


Lidocaine HCl (Buffered Lidocaine 1%) 6 ml 1X  ONCE INJ  Last administered on 

5/6/20at 12:53;  Start 5/6/20 at 12:45;  Stop 5/6/20 at 12:46;  Status DC


Potassium Chloride 75 meq/ Magnesium Sulfate 15 meq/Calcium Gluconate 8 meq/ 

Multivitamins 10 ml/Chromium/ Copper/Manganese/ Seleni/Zn 0.5 ml/ Insulin Human 

Regular 20 unit/ Total Parenteral Nutrition/Amino Acids/Dextrose/ Fat Emulsion 

Intravenous 1,920 ml @  80 mls/hr TPN  CONT IV  Last administered on 5/6/20at 

22:00;  Start 5/6/20 at 22:00;  Stop 5/7/20 at 21:59;  Status DC


Potassium Chloride 75 meq/ Magnesium Sulfate 15 meq/Calcium Gluconate 8 meq/ 

Multivitamins 10 ml/Chromium/ Copper/Manganese/ Seleni/Zn 0.5 ml/ Insulin Human 

Regular 15 unit/ Total Parenteral Nutrition/Amino Acids/Dextrose/ Fat Emulsion 

Intravenous 1,920 ml @  80 mls/hr TPN  CONT IV  Last administered on 5/7/20at 

22:28;  Start 5/7/20 at 22:00;  Stop 5/8/20 at 21:59;  Status DC


Vecuronium Bromide (Norcuron Bolus) 6 mg PRN Q6HRS  PRN IV VENT ASYNCHRONY;  

Start 5/7/20 at 19:15;  Stop 5/7/20 at 19:35;  Status DC


Bumetanide (Bumex) 2 mg 1X  ONCE IV  Last administered on 5/7/20at 22:09;  Start

5/7/20 at 19:45;  Stop 5/7/20 at 19:46;  Status DC


Lidocaine HCl (Buffered Lidocaine 1%) 3 ml STK-MED ONCE .ROUTE ;  Start 5/8/20 

at 07:59;  Stop 5/8/20 at 07:59;  Status DC


Midazolam HCl (Versed) 5 mg STK-MED ONCE .ROUTE ;  Start 5/8/20 at 08:36;  Stop 

5/8/20 at 08:36;  Status DC


Fentanyl Citrate (Fentanyl 5ml Vial) 250 mcg STK-MED ONCE .ROUTE ;  Start 5/8/20

at 08:36;  Stop 5/8/20 at 08:37;  Status DC


Lidocaine HCl (Buffered Lidocaine 1%) 3 ml 1X  ONCE IJ  Last administered on 

5/8/20at 09:30;  Start 5/8/20 at 09:15;  Stop 5/8/20 at 09:16;  Status DC


Midazolam HCl (Versed) 5 mg 1X  ONCE IV  Last administered on 5/8/20at 09:30;  

Start 5/8/20 at 09:15;  Stop 5/8/20 at 09:16;  Status DC


Fentanyl Citrate (Fentanyl 5ml Vial) 250 mcg 1X  ONCE IV  Last administered on 

5/8/20at 09:30;  Start 5/8/20 at 09:15;  Stop 5/8/20 at 09:16;  Status DC


Bumetanide (Bumex) 2 mg DAILY IV  Last administered on 5/18/20at 08:07;  Start 

5/8/20 at 10:00;  Stop 5/18/20 at 17:15;  Status DC


Potassium Chloride 75 meq/ Magnesium Sulfate 15 meq/ Multivitamins 10 

ml/Chromium/ Copper/Manganese/ Seleni/Zn 0.5 ml/ Insulin Human Regular 15 unit/ 

Total Parenteral Nutrition/Amino Acids/Dextrose/ Fat Emulsion Intravenous 1,920 

ml @  80 mls/hr TPN  CONT IV  Last administered on 5/8/20at 21:59;  Start 5/8/20

at 22:00;  Stop 5/9/20 at 21:59;  Status DC


Metoclopramide HCl (Reglan Vial) 10 mg PRN Q3HRS  PRN IVP NAUSEA/VOMITING-3rd 

choice Last administered on 5/14/20at 04:25;  Start 5/9/20 at 16:45


Potassium Chloride 75 meq/ Magnesium Sulfate 15 meq/ Multivitamins 10 

ml/Chromium/ Copper/Manganese/ Seleni/Zn 0.5 ml/ Insulin Human Regular 15 unit/ 

Total Parenteral Nutrition/Amino Acids/Dextrose/ Fat Emulsion Intravenous 1,920 

ml @  80 mls/hr TPN  CONT IV  Last administered on 5/9/20at 22:41;  Start 5/9/20

at 22:00;  Stop 5/10/20 at 21:59;  Status DC


Magnesium Sulfate 50 ml @ 25 mls/hr 1X  ONCE IV  Last administered on 5/10/20at 

10:44;  Start 5/10/20 at 09:00;  Stop 5/10/20 at 10:59;  Status DC


Potassium Chloride/Water 100 ml @  100 mls/hr 1X  ONCE IV  Last administered on 

5/10/20at 09:37;  Start 5/10/20 at 09:00;  Stop 5/10/20 at 09:59;  Status DC


Duloxetine HCl (Cymbalta) 30 mg DAILY PO  Last administered on 5/11/20at 09:48; 

Start 5/10/20 at 14:00;  Stop 5/13/20 at 10:25;  Status DC


Potassium Chloride 80 meq/ Magnesium Sulfate 20 meq/ Multivitamins 10 ml/

Chromium/ Copper/Manganese/ Seleni/Zn 0.5 ml/ Insulin Human Regular 15 unit/ 

Total Parenteral Nutrition/Amino Acids/Dextrose/ Fat Emulsion Intravenous 1,920 

ml @  80 mls/hr TPN  CONT IV  Last administered on 5/10/20at 21:42;  Start 

5/10/20 at 22:00;  Stop 5/11/20 at 21:59;  Status DC


Potassium Chloride 80 meq/ Magnesium Sulfate 20 meq/ Multivitamins 10 

ml/Chromium/ Copper/Manganese/ Seleni/Zn 0.5 ml/ Insulin Human Regular 15 unit/ 

Total Parenteral Nutrition/Amino Acids/Dextrose/ Fat Emulsion Intravenous 1,920 

ml @  80 mls/hr TPN  CONT IV  Last administered on 5/11/20at 22:20;  Start 

5/11/20 at 22:00;  Stop 5/12/20 at 21:59;  Status DC


Lidocaine HCl (Buffered Lidocaine 1%) 3 ml STK-MED ONCE .ROUTE ;  Start 5/12/20 

at 09:54;  Stop 5/12/20 at 09:55;  Status DC


Hydromorphone HCl (Dilaudid Standard PCA) 12 mg STK-MED ONCE IV ;  Start 5/1/20 

at 15:50;  Stop 5/12/20 at 11:24;  Status DC


Potassium Chloride 80 meq/ Magnesium Sulfate 20 meq/ Multivitamins 10 

ml/Chromium/ Copper/Manganese/ Seleni/Zn 0.5 ml/ Insulin Human Regular 15 unit/ 

Total Parenteral Nutrition/Amino Acids/Dextrose/ Fat Emulsion Intravenous 1,920 

ml @  80 mls/hr TPN  CONT IV  Last administered on 5/12/20at 21:40;  Start 

5/12/20 at 22:00;  Stop 5/13/20 at 21:59;  Status DC


Lidocaine HCl (Buffered Lidocaine 1%) 6 ml 1X  ONCE INJ  Last administered on 

5/12/20at 14:15;  Start 5/12/20 at 14:15;  Stop 5/12/20 at 14:16;  Status DC


Potassium Chloride 80 meq/ Magnesium Sulfate 20 meq/ Multivitamins 10 

ml/Chromium/ Copper/Manganese/ Seleni/Zn 1 ml/ Insulin Human Regular 15 unit/ 

Total Parenteral Nutrition/Amino Acids/Dextrose/ Fat Emulsion Intravenous 1,920 

ml @  80 mls/hr TPN  CONT IV  Last administered on 5/13/20at 22:04;  Start 

5/13/20 at 22:00;  Stop 5/14/20 at 21:59;  Status DC


Potassium Chloride/Water 100 ml @  100 mls/hr 1X  ONCE IV  Last administered on 

5/14/20at 11:34;  Start 5/14/20 at 11:00;  Stop 5/14/20 at 11:59;  Status DC


Potassium Chloride 90 meq/ Magnesium Sulfate 20 meq/ Multivitamins 10 

ml/Chromium/ Copper/Manganese/ Seleni/Zn 1 ml/ Insulin Human Regular 15 unit/ 

Total Parenteral Nutrition/Amino Acids/Dextrose/ Fat Emulsion Intravenous 1,920 

ml @  80 mls/hr TPN  CONT IV  Last administered on 5/14/20at 22:57;  Start 

5/14/20 at 22:00;  Stop 5/15/20 at 21:59;  Status DC


Potassium Chloride 90 meq/ Magnesium Sulfate 20 meq/ Multivitamins 10 

ml/Chromium/ Copper/Manganese/ Seleni/Zn 1 ml/ Insulin Human Regular 15 unit/ 

Total Parenteral Nutrition/Amino Acids/Dextrose/ Fat Emulsion Intravenous 1,920 

ml @  80 mls/hr TPN  CONT IV  Last administered on 5/15/20at 22:48;  Start 

5/15/20 at 22:00;  Stop 5/16/20 at 21:59;  Status DC


Potassium Chloride 90 meq/ Magnesium Sulfate 20 meq/ Multivitamins 10 

ml/Chromium/ Copper/Manganese/ Seleni/Zn 1 ml/ Insulin Human Regular 15 unit/ 

Total Parenteral Nutrition/Amino Acids/Dextrose/ Fat Emulsion Intravenous 1,890 

ml @  78.75 mls/ hr TPN  CONT IV  Last administered on 5/16/20at 22:15;  Start 

5/16/20 at 22:00;  Stop 5/17/20 at 21:59;  Status DC


Linezolid/Dextrose 300 ml @  300 mls/hr Q12HR IV  Last administered on 5/19/20at

21:08;  Start 5/17/20 at 09:00;  Stop 5/20/20 at 08:11;  Status DC


Daptomycin 450 mg/ Sodium Chloride 50 ml @  100 mls/hr Q24H IV  Last 

administered on 5/20/20at 09:25;  Start 5/17/20 at 09:00;  Stop 5/21/20 at 

08:30;  Status DC


Potassium Chloride 90 meq/ Magnesium Sulfate 20 meq/ Multivitamins 10 

ml/Chromium/ Copper/Manganese/ Seleni/Zn 1 ml/ Insulin Human Regular 15 unit/ 

Total Parenteral Nutrition/Amino Acids/Dextrose/ Fat Emulsion Intravenous 1,890 

ml @  78.75 mls/ hr TPN  CONT IV  Last administered on 5/17/20at 21:34;  Start 

5/17/20 at 22:00;  Stop 5/18/20 at 21:59;  Status DC


Lorazepam (Ativan Inj) 2 mg STK-MED ONCE .ROUTE ;  Start 5/17/20 at 14:58;  Stop

5/17/20 at 14:58;  Status DC


Metoprolol Tartrate (Lopressor Vial) 5 mg 1X  ONCE IVP  Last administered on 

5/17/20at 15:31;  Start 5/17/20 at 15:15;  Stop 5/17/20 at 15:16;  Status DC


Lorazepam (Ativan Inj) 2 mg 1X  ONCE IVP  Last administered on 5/17/20at 15:30; 

Start 5/17/20 at 15:15;  Stop 5/17/20 at 15:16;  Status DC


Enoxaparin Sodium (Lovenox 40mg Syringe) 40 mg Q24H SQ  Last administered on 

6/5/20at 17:44;  Start 5/17/20 at 17:00;  Stop 6/7/20 at 06:50;  Status DC


Lorazepam (Ativan Inj) 1 mg PRN Q4HRS  PRN IVP ANXIETY / AGITATION MILD-MOD Last

administered on 5/31/20at 15:55;  Start 5/17/20 at 19:15;  Stop 6/2/20 at 11:45;

 Status DC


Lorazepam (Ativan Inj) 2 mg PRN Q4HRS  PRN IVP ANXIETY / AGITATION SEVERE Last 

administered on 6/1/20at 07:55;  Start 5/17/20 at 19:15;  Stop 6/2/20 at 11:45; 

Status DC


Fentanyl Citrate (Fentanyl 2ml Vial) 50 mcg PRN Q4HRS  PRN IVP SEVERE PAIN Last 

administered on 6/9/20at 04:40;  Start 5/18/20 at 13:15


Fentanyl Citrate (Fentanyl 2ml Vial) 25 mcg PRN Q4HRS  PRN IVP MODERATE PAIN 

Last administered on 6/11/20at 22:50;  Start 5/18/20 at 13:15


Potassium Chloride 90 meq/ Magnesium Sulfate 20 meq/ Multivitamins 10 

ml/Chromium/ Copper/Manganese/ Seleni/Zn 1 ml/ Insulin Human Regular 15 unit/ 

Total Parenteral Nutrition/Amino Acids/Dextrose/ Fat Emulsion Intravenous 1,890 

ml @  78.75 mls/ hr TPN  CONT IV  Last administered on 5/18/20at 22:18;  Start 

5/18/20 at 22:00;  Stop 5/19/20 at 21:59;  Status DC


Furosemide (Lasix) 40 mg 1X  ONCE IVP  Last administered on 5/18/20at 21:51;  

Start 5/18/20 at 21:45;  Stop 5/18/20 at 21:48;  Status DC


Albumin Human 100 ml @  100 mls/hr 1X PRN  PRN IV SEE COMMENTS;  Start 5/19/20 

at 01:30


Furosemide (Lasix) 40 mg [id number] IVP  Last administered on 6/3/20at 08:04;  Start 

5/19/20 at 14:00;  Stop 6/3/20 at 13:07;  Status DC


Potassium Chloride 90 meq/ Magnesium Sulfate 20 meq/ Multivitamins 10 ml/Chromiu

m/ Copper/Manganese/ Seleni/Zn 1 ml/ Insulin Human Regular 15 unit/ Total 

Parenteral Nutrition/Amino Acids/Dextrose/ Fat Emulsion Intravenous 1,800 ml @  

75 mls/hr TPN  CONT IV  Last administered on 5/19/20at 22:31;  Start 5/19/20 at 

22:00;  Stop 5/20/20 at 21:59;  Status DC


Potassium Chloride 90 meq/ Magnesium Sulfate 20 meq/ Multivitamins 10 

ml/Chromium/ Copper/Manganese/ Seleni/Zn 1 ml/ Insulin Human Regular 15 unit/ 

Total Parenteral Nutrition/Amino Acids/Dextrose/ Fat Emulsion Intravenous 1,800 

ml @  75 mls/hr TPN  CONT IV  Last administered on 5/20/20at 22:28;  Start 

5/20/20 at 22:00;  Stop 5/21/20 at 21:59;  Status DC


Potassium Chloride 110 meq/ Magnesium Sulfate 20 meq/ Multivitamins 10 

ml/Chromium/ Copper/Manganese/ Seleni/Zn 1 ml/ Insulin Human Regular 15 unit/ 

Total Parenteral Nutrition/Amino Acids/Dextrose/ Fat Emulsion Intravenous 1,800 

ml @  75 mls/hr TPN  CONT IV  Last administered on 5/21/20at 22:01;  Start 

5/21/20 at 22:00;  Stop 5/22/20 at 21:59;  Status DC


Saliva Substitute (Biotene Moisturizing Mouth) 2 spray PRN Q15MIN  PRN PO DRY 

MOUTH;  Start 5/21/20 at 11:00


Potassium Chloride 110 meq/ Magnesium Sulfate 20 meq/ Multivitamins 10 

ml/Chromium/ Copper/Manganese/ Seleni/Zn 1 ml/ Insulin Human Regular 15 unit/ 

Total Parenteral Nutrition/Amino Acids/Dextrose/ Fat Emulsion Intravenous 1,800 

ml @  75 mls/hr TPN  CONT IV  Last administered on 5/22/20at 22:21;  Start 

5/22/20 at 22:00;  Stop 5/23/20 at 21:59;  Status DC


Potassium Chloride 110 meq/ Magnesium Sulfate 20 meq/ Multivitamins 10 

ml/Chromium/ Copper/Manganese/ Seleni/Zn 1 ml/ Insulin Human Regular 15 unit/ 

Total Parenteral Nutrition/Amino Acids/Dextrose/ Fat Emulsion Intravenous 1,800 

ml @  75 mls/hr TPN  CONT IV  Last administered on 5/23/20at 22:04;  Start 

5/23/20 at 22:00;  Stop 5/24/20 at 21:59;  Status DC


Potassium Chloride 110 meq/ Magnesium Sulfate 20 meq/ Multivitamins 10 

ml/Chromium/ Copper/Manganese/ Seleni/Zn 1 ml/ Insulin Human Regular 15 unit/ 

Total Parenteral Nutrition/Amino Acids/Dextrose/ Fat Emulsion Intravenous 1,800 

ml @  75 mls/hr TPN  CONT IV  Last administered on 5/24/20at 22:48;  Start 

5/24/20 at 22:00;  Stop 5/25/20 at 21:59;  Status DC


Potassium Chloride 70 meq/ Magnesium Sulfate 20 meq/ Multivitamins 10 

ml/Chromium/ Copper/Manganese/ Seleni/Zn 1 ml/ Insulin Human Regular 15 unit/ 

Total Parenteral Nutrition/Amino Acids/Dextrose/ Fat Emulsion Intravenous 1,800 

ml @  75 mls/hr TPN  CONT IV  Last administered on 5/25/20at 21:39;  Start 

5/25/20 at 22:00;  Stop 5/26/20 at 21:59;  Status DC


Meropenem 500 mg/ Sodium Chloride 50 ml @  100 mls/hr Q6HRS IV  Last 

administered on 5/27/20at 06:02;  Start 5/25/20 at 18:00;  Stop 5/27/20 at 

09:59;  Status DC


Barium Sulfate (Varibar Thin Liquid Apple) 148 gm 1X  ONCE PO ;  Start 5/26/20 

at 11:45;  Stop 5/26/20 at 11:49;  Status DC


Potassium Chloride 70 meq/ Magnesium Sulfate 20 meq/ Multivitamins 10 

ml/Chromium/ Copper/Manganese/ Seleni/Zn 1 ml/ Insulin Human Regular 15 unit/ 

Total Parenteral Nutrition/Amino Acids/Dextrose/ Fat Emulsion Intravenous 1,800 

ml @  75 mls/hr TPN  CONT IV  Last administered on 5/26/20at 22:27;  Start 

5/26/20 at 22:00;  Stop 5/27/20 at 21:59;  Status DC


Piperacillin Sod/ Tazobactam Sod 3.375 gm/Sodium Chloride 50 ml @  100 mls/hr 

Q6HRS IV  Last administered on 6/4/20at 06:10;  Start 5/27/20 at 12:00;  Stop 

6/4/20 at 07:26;  Status DC


Potassium Chloride 70 meq/ Magnesium Sulfate 20 meq/ Multivitamins 10 

ml/Chromium/ Copper/Manganese/ Seleni/Zn 1 ml/ Insulin Human Regular 15 unit/ 

Total Parenteral Nutrition/Amino Acids/Dextrose/ Fat Emulsion Intravenous 1,800 

ml @  75 mls/hr TPN  CONT IV  Last administered on 5/27/20at 22:03;  Start 

5/27/20 at 22:00;  Stop 5/28/20 at 21:59;  Status DC


Potassium Chloride 70 meq/ Magnesium Sulfate 20 meq/ Multivitamins 10 

ml/Chromium/ Copper/Manganese/ Seleni/Zn 1 ml/ Insulin Human Regular 15 unit/ 

Total Parenteral Nutrition/Amino Acids/Dextrose/ Fat Emulsion Intravenous 1,800 

ml @  75 mls/hr TPN  CONT IV  Last administered on 5/28/20at 22:33;  Start 

5/28/20 at 22:00;  Stop 5/29/20 at 21:59;  Status DC


Potassium Chloride 70 meq/ Magnesium Sulfate 20 meq/ Multivitamins 10 

ml/Chromium/ Copper/Manganese/ Seleni/Zn 1 ml/ Insulin Human Regular 15 unit/ 

Total Parenteral Nutrition/Amino Acids/Dextrose/ Fat Emulsion Intravenous 1,800 

ml @  75 mls/hr TPN  CONT IV  Last administered on 5/29/20at 23:13;  Start 

5/29/20 at 22:00;  Stop 5/30/20 at 21:59;  Status DC


Potassium Chloride 80 meq/ Magnesium Sulfate 20 meq/ Multivitamins 10 

ml/Chromium/ Copper/Manganese/ Seleni/Zn 1 ml/ Insulin Human Regular 15 unit/ 

Total Parenteral Nutrition/Amino Acids/Dextrose/ Fat Emulsion Intravenous 1,800 

ml @  75 mls/hr TPN  CONT IV  Last administered on 5/30/20at 22:30;  Start 

5/30/20 at 22:00;  Stop 5/31/20 at 21:59;  Status DC


Potassium Chloride 80 meq/ Magnesium Sulfate 20 meq/ Multivitamins 10 

ml/Chromium/ Copper/Manganese/ Seleni/Zn 1 ml/ Insulin Human Regular 15 unit/ 

Total Parenteral Nutrition/Amino Acids/Dextrose/ Fat Emulsion Intravenous 1,800 

ml @  75 mls/hr TPN  CONT IV  Last administered on 5/31/20at 21:54;  Start 

5/31/20 at 22:00;  Stop 6/1/20 at 21:59;  Status DC


Potassium Chloride/Water 100 ml @  100 mls/hr 1X  ONCE IV  Last administered on 

6/1/20at 10:15;  Start 6/1/20 at 10:00;  Stop 6/1/20 at 10:59;  Status DC


Potassium Chloride 90 meq/ Magnesium Sulfate 20 meq/ Multivitamins 10 

ml/Chromium/ Copper/Manganese/ Seleni/Zn 1 ml/ Insulin Human Regular 20 unit/ 

Total Parenteral Nutrition/Amino Acids/Dextrose/ Fat Emulsion Intravenous 1,800 

ml @  75 mls/hr TPN  CONT IV  Last administered on 6/1/20at 22:28;  Start 6/1/20

at 22:00;  Stop 6/2/20 at 21:59;  Status DC


Potassium Chloride 90 meq/ Magnesium Sulfate 20 meq/ Multivitamins 10 

ml/Chromium/ Copper/Manganese/ Seleni/Zn 1 ml/ Insulin Human Regular 20 unit/ 

Total Parenteral Nutrition/Amino Acids/Dextrose/ Fat Emulsion Intravenous 1,800 

ml @  75 mls/hr TPN  CONT IV  Last administered on 6/2/20at 22:08;  Start 6/2/20

at 22:00;  Stop 6/3/20 at 21:59;  Status DC


Lorazepam (Ativan Inj) 0.25 mg PRN Q4HRS  PRN IVP ANXIETY / AGITATION Last 

administered on 6/11/20at 05:53;  Start 6/3/20 at 07:30


Potassium Chloride 90 meq/ Magnesium Sulfate 20 meq/ Multivitamins 10 

ml/Chromium/ Copper/Manganese/ Seleni/Zn 1 ml/ Insulin Human Regular 20 unit/ 

Total Parenteral Nutrition/Amino Acids/Dextrose/ Fat Emulsion Intravenous 1,800 

ml @  75 mls/hr TPN  CONT IV  Last administered on 6/3/20at 23:13;  Start 6/3/20

at 22:00;  Stop 6/4/20 at 21:59;  Status DC


Furosemide (Lasix) 40 mg DAILY IVP  Last administered on 6/5/20at 11:14;  Start 

6/3/20 at 13:30;  Stop 6/7/20 at 09:12;  Status DC


Fluoxetine HCl (PROzac) 20 mg QHS PEG  Last administered on 6/11/20at 23:22;  

Start 6/4/20 at 21:00


Fentanyl (Duragesic 50mcg/ Hr Patch) 1 patch Q72H TD  Last administered on 6/4/ 20at 21:22;  Start 6/4/20 at 21:00


Potassium Chloride 40 meq/ Potassium Acetate 60 meq/Magnesium Sulfate 10 meq/ 

Multivitamins 10 ml/Chromium/ Copper/Manganese/ Seleni/Zn 1 ml/ Insulin Human 

Regular 20 unit/ Total Parenteral Nutrition/Amino Acids/Dextrose/ Fat Emulsion 

Intravenous 1,800 ml @  75 mls/hr TPN  CONT IV  Last administered on 6/5/20at 

00:03;  Start 6/4/20 at 22:00;  Stop 6/5/20 at 21:59;  Status DC


Potassium Acetate 80 meq/Magnesium Sulfate 5 meq/ Multivitamins 10 ml/Chromium/ 

Copper/Manganese/ Seleni/Zn 1 ml/ Insulin Human Regular 20 unit/ Total 

Parenteral Nutrition/Amino Acids/Dextrose/ Fat Emulsion Intravenous 1,920 ml @  

80 mls/hr TPN  CONT IV  Last administered on 6/5/20at 21:59;  Start 6/5/20 at 

22:00;  Stop 6/6/20 at 21:59;  Status DC


Potassium Acetate 60 meq/Magnesium Sulfate 5 meq/ Multivitamins 10 ml/Chromium/ 

Copper/Manganese/ Seleni/Zn 1 ml/ Insulin Human Regular 30 unit/ Total 

Parenteral Nutrition/Amino Acids/Dextrose/ Fat Emulsion Intravenous 1,920 ml @  

80 mls/hr TPN  CONT IV  Last administered on 6/6/20at 21:54;  Start 6/6/20 at 

22:00;  Stop 6/7/20 at 21:59;  Status DC


Norepinephrine Bitartrate 8 mg/ Dextrose 258 ml @  13.332 mls/ hr CONT  PRN IV 

PER PROTOCOL Last administered on 6/7/20at 21:46;  Start 6/7/20 at 06:30


Albumin Human 500 ml @  125 mls/hr 1X  ONCE IV  Last administered on 6/7/20at 

08:10;  Start 6/7/20 at 08:15;  Stop 6/7/20 at 12:14;  Status DC


Potassium Acetate 40 meq/Magnesium Sulfate 5 meq/ Multivitamins 10 ml/Chromium/ 

Copper/Manganese/ Seleni/Zn 1 ml/ Insulin Human Regular 30 unit/ Total 

Parenteral Nutrition/Amino Acids/Dextrose/ Fat Emulsion Intravenous 1,920 ml @  

80 mls/hr TPN  CONT IV  Last administered on 6/7/20at 22:23;  Start 6/7/20 at 

22:00;  Stop 6/8/20 at 21:59;  Status DC


Meropenem 1 gm/ Sodium Chloride 100 ml @  200 mls/hr Q8HRS IV ;  Start 6/7/20 at

14:00;  Status Cancel


Meropenem 1 gm/ Sodium Chloride 100 ml @  200 mls/hr Q8HRS IV  Last administered

on 6/7/20at 11:04;  Start 6/7/20 at 10:00;  Stop 6/7/20 at 13:00;  Status DC


Meropenem 1 gm/ Sodium Chloride 100 ml @  200 mls/hr Q12HR IV  Last administered

on 6/11/20at 23:22;  Start 6/7/20 at 21:00


Sodium Chloride 1,000 ml @  1,000 mls/hr 1X  ONCE IV  Last administered on 

6/7/20at 11:06;  Start 6/7/20 at 10:45;  Stop 6/7/20 at 11:44;  Status DC


Micafungin Sodium 100 mg/Dextrose 100 ml @  100 mls/hr Q24H IV  Last 

administered on 6/11/20at 10:04;  Start 6/7/20 at 11:00


Daptomycin 410 mg/ Sodium Chloride 50 ml @  100 mls/hr Q24H IV  Last 

administered on 6/9/20at 13:33;  Start 6/7/20 at 14:00;  Stop 6/10/20 at 08:30; 

Status DC


Midazolam HCl (Versed) 2 mg STK-MED ONCE .ROUTE ;  Start 6/7/20 at 14:47;  Stop 

6/7/20 at 14:48;  Status DC


Fentanyl Citrate (Fentanyl 2ml Vial) 100 mcg STK-MED ONCE .ROUTE ;  Start 6/7/20

at 14:47;  Stop 6/7/20 at 14:48;  Status DC


Flumazenil (Romazicon) 0.5 mg STK-MED ONCE IV ;  Start 6/7/20 at 14:48;  Stop 

6/7/20 at 14:48;  Status DC


Naloxone HCl (Narcan) 0.4 mg STK-MED ONCE .ROUTE ;  Start 6/7/20 at 14:48;  Stop

6/7/20 at 14:48;  Status DC


Lidocaine HCl (Lidocaine 1% 20ml Vial) 20 ml STK-MED ONCE .ROUTE ;  Start 6/7/20

at 14:48;  Stop 6/7/20 at 14:48;  Status DC


Midazolam HCl (Versed) 2 mg 1X  ONCE IV  Last administered on 6/7/20at 15:28;  

Start 6/7/20 at 15:00;  Stop 6/7/20 at 15:01;  Status DC


Fentanyl Citrate (Fentanyl 2ml Vial) 100 mcg 1X  ONCE IV  Last administered on 

6/7/20at 15:28;  Start 6/7/20 at 15:00;  Stop 6/7/20 at 15:01;  Status DC


Lidocaine HCl (Lidocaine 1% 20ml Vial) 20 ml 1X  ONCE INJ  Last administered on 

6/7/20at 15:30;  Start 6/7/20 at 15:00;  Stop 6/7/20 at 15:01;  Status DC


Sodium Chloride 1,000 ml @  100 mls/hr Q10H IV  Last administered on 6/12/20at 

02:19;  Start 6/7/20 at 20:00


Sodium Bicarbonate (Sodium Bicarb Adult 8.4% Syr) 50 meq 1X  ONCE IV  Last 

administered on 6/7/20at 21:47;  Start 6/7/20 at 22:00;  Stop 6/7/20 at 22:01;  

Status DC


Potassium Acetate 40 meq/Magnesium Sulfate 5 meq/ Multivitamins 10 ml/Chromium/ 

Copper/Manganese/ Seleni/Zn 1 ml/ Insulin Human Regular 30 unit/ Total 

Parenteral Nutrition/Amino Acids/Dextrose/ Fat Emulsion Intravenous 1,920 ml @  

80 mls/hr TPN  CONT IV  Last administered on 6/8/20at 22:28;  Start 6/8/20 at 

22:00;  Stop 6/9/20 at 21:59;  Status DC


Sodium Chloride 500 ml @  500 mls/hr 1X  ONCE IV  Last administered on 6/9/20at 

06:39;  Start 6/9/20 at 06:45;  Stop 6/9/20 at 07:44;  Status DC


Potassium Acetate 40 meq/Magnesium Sulfate 5 meq/ Multivitamins 10 ml/Chromium/ 

Copper/Manganese/ Seleni/Zn 1 ml/ Insulin Human Regular 30 unit/ Total 

Parenteral Nutrition/Amino Acids/Dextrose/ Fat Emulsion Intravenous 1,920 ml @  

80 mls/hr TPN  CONT IV  Last administered on 6/9/20at 22:03;  Start 6/9/20 at 

22:00;  Stop 6/10/20 at 21:59;  Status DC


Metoprolol Tartrate (Lopressor Vial) 5 mg PRN Q6HRS  PRN IVP HYPERTENSION Last 

administered on 6/11/20at 14:33;  Start 6/10/20 at 09:00


Potassium Acetate 40 meq/Magnesium Sulfate 5 meq/ Multivitamins 10 ml/Chromium/ 

Copper/Manganese/ Seleni/Zn 1 ml/ Insulin Human Regular 30 unit/ Total 

Parenteral Nutrition/Amino Acids/Dextrose/ Fat Emulsion Intravenous 1,920 ml @  

80 mls/hr TPN  CONT IV  Last administered on 6/10/20at 21:26;  Start 6/10/20 at 

22:00;  Stop 6/11/20 at 21:59;  Status DC


Potassium Acetate 40 meq/Magnesium Sulfate 5 meq/ Multivitamins 10 ml/Chromium/ 

Copper/Manganese/ Seleni/Zn 1 ml/ Insulin Human Regular 30 unit/ Total 

Parenteral Nutrition/Amino Acids/Dextrose/ Fat Emulsion Intravenous 1,920 ml @  

80 mls/hr TPN  CONT IV  Last administered on 6/11/20at 23:23;  Start 6/11/20 at 

22:00;  Stop 6/12/20 at 21:59





Active Scripts


Active


Reported


Bisoprolol Fumarate 5 Mg Tablet 10 Mg PO DAILY


Vitals/I & O





Vital Sign - Last 24 Hours








 6/11/20 6/11/20 6/11/20 6/11/20





 09:00 10:00 10:03 10:03


 


Pulse 134 122  144


 


Resp 42 40 44 


 


B/P (MAP) 159/77 (104) 124/68 (86)  198/82


 


Pulse Ox 96 98 95 


 


O2 Delivery Tracheal Collar Tracheal Collar Tracheal Collar 


 


O2 Flow Rate 10.0 10.0 10.0 





 6/11/20 6/11/20 6/11/20 6/11/20





 10:33 11:00 11:45 12:00


 


Pulse  124  


 


Resp 40 38  


 


B/P (MAP)  131/61 (84)  


 


Pulse Ox 96 95  


 


O2 Delivery Tracheal Collar Tracheal Collar Trach Collar 


 


O2 Flow Rate 10.0 10.0 10.0 





 6/11/20 6/11/20 6/11/20 6/11/20





 12:00 13:00 14:00 14:33


 


Temp 98.4   





 98.4   


 


Pulse 128 129 136 


 


Resp 40 36 30 46


 


B/P (MAP) 149/79 (102) 131/68 (89) 116/54 (74) 


 


Pulse Ox 99 97 97 95


 


O2 Delivery Tracheal Collar Tracheal Collar Tracheal Collar Tracheal Collar


 


O2 Flow Rate 10.0 10.0 10.0 


 


    





    





 6/11/20 6/11/20 6/11/20 6/11/20





 14:33 15:00 15:03 15:46


 


Pulse 136 119  


 


Resp  44 42 


 


B/P (MAP) 161/73 129/67 (87)  


 


Pulse Ox  95 97 


 


O2 Delivery  Tracheal Collar Tracheal Collar Trach Collar


 


O2 Flow Rate  10.0 10.0 10.0





 6/11/20 6/11/20 6/11/20 6/11/20





 16:00 16:00 17:00 18:00


 


Temp  99.2  





  99.2  


 


Pulse  127 124 122


 


Resp  45 44 42


 


B/P (MAP)  127/68 (87) 137/72 (93) 132/69 (90)


 


Pulse Ox  98 95 95


 


O2 Delivery  Tracheal Collar Tracheal Collar Tracheal Collar


 


O2 Flow Rate  10.0 10.0 10.0


 


    





    





 6/11/20 6/11/20 6/11/20 6/11/20





 20:00 20:00 20:00 21:00


 


Pulse   122 122


 


Resp   38 42


 


B/P (MAP)   138/71 (93) 140/75 (96)


 


Pulse Ox   95 95


 


O2 Delivery  Trach Collar Tracheal Collar Tracheal Collar


 


O2 Flow Rate  10.0 10.0 10.0





 6/11/20 6/11/20 6/11/20 6/11/20





 22:00 22:50 23:00 23:20


 


Pulse 128  128 


 


Resp 42 48 40 36


 


B/P (MAP) 130/54 (79)  144/68 (93) 


 


Pulse Ox 95 95 95 95


 


O2 Delivery Tracheal Collar Tracheal Collar Tracheal Collar 


 


O2 Flow Rate 10.0 10.0 10.0 10.0





 6/12/20 6/12/20 6/12/20 6/12/20





 00:00 00:01 00:01 01:00


 


Temp   99.5 





   99.5 


 


Pulse   104 134


 


Resp   44 42


 


B/P (MAP)   136/64 (88) 157/64 (95)


 


Pulse Ox   95 95


 


O2 Delivery  Trach Collar Tracheal Collar Tracheal Collar


 


O2 Flow Rate  10.0 10.0 10.0


 


    





    





 6/12/20 6/12/20 6/12/20 6/12/20





 02:00 03:00 04:00 04:00


 


Pulse 131 128  


 


Resp 44 34  


 


B/P (MAP) 130/70 (90) 139/70 (93)  


 


Pulse Ox 99 100  


 


O2 Delivery Tracheal Collar Tracheal Collar  Trach Collar


 


O2 Flow Rate 10.0 10.0  10.0





 6/12/20 6/12/20 6/12/20 6/12/20





 04:00 05:00 06:00 07:00


 


Temp 98.9   





 98.9   


 


Pulse 128 128 130 128


 


Resp 34 49  31


 


B/P (MAP) 154/76 (102) 150/76 (100) 146/74 (98) 130/60 (83)


 


Pulse Ox 100 99 99 100


 


O2 Delivery Tracheal Collar Tracheal Collar Tracheal Collar Tracheal Collar


 


O2 Flow Rate 10.0 10.0 10.0 10.0


 


    





    





 6/12/20 6/12/20  





 07:42 08:26  


 


Temp  98.7  





  98.7  


 


Pulse  124  


 


Resp  42  


 


B/P (MAP)  147/75  


 


O2 Delivery Trach Collar   


 


O2 Flow Rate 10.0   














Intake and Output   


 


 6/11/20 6/11/20 6/12/20





 15:00 23:00 07:00


 


Intake Total 200 ml 1945 ml 2373 ml


 


Output Total 685 ml 1355 ml 825 ml


 


Balance -485 ml 590 ml 1548 ml











Hemodynamically unstable?:  No


Is patient in severe pain?:  No


Is NPO status required?:  No











GAETANO GONCALVES MD        Jun 12, 2020 08:35

## 2020-06-12 NOTE — NUR
Pharmacy TPN Dosing Note



S: JESENIA BEAN is a 49 year old F Currently receiving Central Continuous TPN started 
03/18/20



B:Pertinent PMH: Necrotizing pancreatitis

Height: 5 feet, 8 inches

Weight: 79.0 kg



Current diet: NPO 



LABS:

Sodium:    146 

Potassium: 3.9 

Chloride:  114 

Calcium:   9.8 

Corrected Calcium: 12.12 

Magnesium: 1.8 

CO2:       25 

SCr:       0.8 

Glucose:   158-176 

Albumin:   1.1 

AST:       22 

ALT:       16 



TPN FORMULA:

TPN TYPE:  Central Continuous

AMINO ACIDS:         75 gm

DEXTROSE:            250 gm

LIPIDS:              20 gm

POTASSIUM ACETATE:   40 mEq

MAGNESIUM:           5 mEq

INSULIN:             30 units

MULTIPLE VITAMIN:    10 ml

TRACE ELEMENTS:      1 ml(s)



TPN PLAN:  

Na trending down. BG at goal. Continue same TPN.





R: Continue same TPN formula.

Will monitor electrolytes, glucose, and tolerance to TPN.



 LORNEZO LESLIE RPH, 06/12/20 1300

## 2020-06-12 NOTE — NUR
Patient had increased drainage out of ROBERT drains throughout shift, multiple MDs aware. 
Patient's VSS. Patient remains lethargic, arousable to name or shaking.

## 2020-06-12 NOTE — NUR
Approx 2200H, vomited green bile.  Sao2 <88 %. NG placed on low continuous suction with very 
little amount. NG flushed with 30mls and placed on suction. After flushing all three drains 
>500 mls of sanguinous fluid out of JPs. Deep suction to trach with green secretions. Trach 
care.  After interventions, NG dc'd from suciton and clamped and placed back on trach 
shield. Continued to monitor drains and oxygenation.

## 2020-06-12 NOTE — PDOC
Objective:


Objective:


D/w nurse - lethargic, lots of drain output.


Vital Signs:





                                   Vital Signs








  Date Time  Temp Pulse Resp B/P (MAP) Pulse Ox O2 Delivery O2 Flow Rate FiO2


 


6/12/20 13:00 98.8 112 27 127/65 (85) 98 Tracheal Collar 10.0 





 98.8       








Labs:





Laboratory Tests








Test


 6/12/20


06:05 6/12/20


06:18 6/12/20


12:05 6/12/20


12:45


 


White Blood Count 8.7 x10^3/uL    7.0 x10^3/uL 


 


Red Blood Count 2.56 x10^6/uL    2.99 x10^6/uL 


 


Hemoglobin 7.6 g/dL    8.5 g/dL 


 


Hematocrit 22.9 %    25.7 % 


 


Mean Corpuscular Volume 90 fL    86 fL 


 


Mean Corpuscular Hemoglobin 30 pg    29 pg 


 


Mean Corpuscular Hemoglobin


Concent 33 g/dL 


 


 


 33 g/dL 





 


Red Cell Distribution Width 16.6 %    19.7 % 


 


Platelet Count 349 x10^3/uL    347 x10^3/uL 


 


Neutrophils (%) (Auto) 74 %    


 


Lymphocytes (%) (Auto) 18 %    


 


Monocytes (%) (Auto) 6 %    


 


Eosinophils (%) (Auto) 1 %    


 


Basophils (%) (Auto) 0 %    


 


Neutrophils # (Auto) 6.5 x10^3/uL    


 


Lymphocytes # (Auto) 1.6 x10^3/uL    


 


Monocytes # (Auto) 0.6 x10^3/uL    


 


Eosinophils # (Auto) 0.1 x10^3/uL    


 


Basophils # (Auto) 0.0 x10^3/uL    


 


Sodium Level 146 mmol/L    


 


Potassium Level 3.9 mmol/L    


 


Chloride Level 114 mmol/L    


 


Carbon Dioxide Level 25 mmol/L    


 


Anion Gap 7    


 


Blood Urea Nitrogen 29 mg/dL    


 


Creatinine 0.8 mg/dL    


 


Estimated GFR


(Cockcroft-Gault) 76.2 


 


 


 





 


BUN/Creatinine Ratio 36    


 


Glucose Level 176 mg/dL    


 


Calcium Level 9.8 mg/dL    


 


Total Bilirubin 0.4 mg/dL    


 


Aspartate Amino Transf


(AST/SGOT) 22 U/L 


 


 


 





 


Alanine Aminotransferase


(ALT/SGPT) 16 U/L 


 


 


 





 


Alkaline Phosphatase 83 U/L    


 


Total Protein 5.1 g/dL    


 


Albumin 1.1 g/dL    


 


Albumin/Globulin Ratio 0.3    


 


Glucose (Fingerstick)  165 mg/dL  158 mg/dL  











PE:





GEN: ill


LUNGS: trach collar


HEART: tachycardic


ABD: drains w/ dark red/brown blood


NEURO/PSYCH: briefly opens eyes





A/P:


Pancreatitis, MOSF, MDRO Pseudomonas





--


Continue support.





Justicifation of Admission Dx:


Justifications for Admission:


Justification of Admission Dx:  Yes











RAGHAVENDRA MURCIA         Jun 12, 2020 13:42

## 2020-06-12 NOTE — PDOC
SURGICAL PROGRESS NOTE


Subjective


drowsy


d/w nurse


received unit of blood


Vital Signs





Vital Signs








  Date Time  Temp Pulse Resp B/P (MAP) Pulse Ox O2 Delivery O2 Flow Rate FiO2


 


6/12/20 12:11  126  150/69    


 


6/12/20 11:41 99.0  38     





 99.0       


 


6/12/20 11:32      Trach Collar 10.0 


 


6/12/20 11:14     97   








I&O











Intake and Output 


 


 6/12/20





 07:00


 


Intake Total 4518 ml


 


Output Total 2865 ml


 


Balance 1653 ml


 


 


 


IV Total 4518 ml


 


Output Urine Total 1810 ml


 


Drainage Total 1055 ml


 


# Bowel Movements 1








General:  Cooperative, No acute distress


Abdomen:  Soft, Other (drains in place)


Labs





Laboratory Tests








Test


 6/10/20


18:37 6/10/20


23:30 6/11/20


06:02 6/11/20


06:15


 


Glucose (Fingerstick)


 152 mg/dL


(70-99) 145 mg/dL


(70-99) 167 mg/dL


(70-99) 





 


White Blood Count


 


 


 


 9.8 x10^3/uL


(4.0-11.0)


 


Red Blood Count


 


 


 


 2.90 x10^6/uL


(3.50-5.40)


 


Hemoglobin


 


 


 


 8.6 g/dL


(12.0-15.5)


 


Hematocrit


 


 


 


 26.2 %


(36.0-47.0)


 


Mean Corpuscular Volume    91 fL () 


 


Mean Corpuscular Hemoglobin    30 pg (25-35) 


 


Mean Corpuscular Hemoglobin


Concent 


 


 


 33 g/dL


(31-37)


 


Red Cell Distribution Width


 


 


 


 17.4 %


(11.5-14.5)


 


Platelet Count


 


 


 


 335 x10^3/uL


(140-400)


 


Neutrophils (%) (Auto)    70 % (31-73) 


 


Lymphocytes (%) (Auto)    24 % (24-48) 


 


Monocytes (%) (Auto)    5 % (0-9) 


 


Eosinophils (%) (Auto)    1 % (0-3) 


 


Basophils (%) (Auto)    0 % (0-3) 


 


Neutrophils # (Auto)


 


 


 


 6.9 x10^3/uL


(1.8-7.7)


 


Lymphocytes # (Auto)


 


 


 


 2.4 x10^3/uL


(1.0-4.8)


 


Monocytes # (Auto)


 


 


 


 0.5 x10^3/uL


(0.0-1.1)


 


Eosinophils # (Auto)


 


 


 


 0.1 x10^3/uL


(0.0-0.7)


 


Basophils # (Auto)


 


 


 


 0.0 x10^3/uL


(0.0-0.2)


 


Sodium Level


 


 


 


 147 mmol/L


(136-145)


 


Potassium Level


 


 


 


 4.1 mmol/L


(3.5-5.1)


 


Chloride Level


 


 


 


 115 mmol/L


()


 


Carbon Dioxide Level


 


 


 


 22 mmol/L


(21-32)


 


Anion Gap    10 (6-14) 


 


Blood Urea Nitrogen


 


 


 


 32 mg/dL


(7-20)


 


Creatinine


 


 


 


 0.9 mg/dL


(0.6-1.0)


 


Estimated GFR


(Cockcroft-Gault) 


 


 


 66.5 





 


BUN/Creatinine Ratio    36 (6-20) 


 


Glucose Level


 


 


 


 181 mg/dL


(70-99)


 


Calcium Level


 


 


 


 9.9 mg/dL


(8.5-10.1)


 


Phosphorus Level


 


 


 


 4.0 mg/dL


(2.6-4.7)


 


Magnesium Level


 


 


 


 1.8 mg/dL


(1.8-2.4)


 


Total Bilirubin


 


 


 


 0.4 mg/dL


(0.2-1.0)


 


Aspartate Amino Transf


(AST/SGOT) 


 


 


 21 U/L (15-37) 





 


Alanine Aminotransferase


(ALT/SGPT) 


 


 


 17 U/L (14-59) 





 


Alkaline Phosphatase


 


 


 


 91 U/L


()


 


Total Protein


 


 


 


 5.2 g/dL


(6.4-8.2)


 


Albumin


 


 


 


 1.3 g/dL


(3.4-5.0)


 


Albumin/Globulin Ratio    0.3 (1.0-1.7) 


 


Triglycerides Level


 


 


 


 203 mg/dL


(0-150)


 


Test


 6/11/20


11:32 6/12/20


06:05 6/12/20


06:18 6/12/20


12:05


 


Glucose (Fingerstick)


 163 mg/dL


(70-99) 


 165 mg/dL


(70-99) 158 mg/dL


(70-99)


 


White Blood Count


 


 8.7 x10^3/uL


(4.0-11.0) 


 





 


Red Blood Count


 


 2.56 x10^6/uL


(3.50-5.40) 


 





 


Hemoglobin


 


 7.6 g/dL


(12.0-15.5) 


 





 


Hematocrit


 


 22.9 %


(36.0-47.0) 


 





 


Mean Corpuscular Volume  90 fL ()   


 


Mean Corpuscular Hemoglobin  30 pg (25-35)   


 


Mean Corpuscular Hemoglobin


Concent 


 33 g/dL


(31-37) 


 





 


Red Cell Distribution Width


 


 16.6 %


(11.5-14.5) 


 





 


Platelet Count


 


 349 x10^3/uL


(140-400) 


 





 


Neutrophils (%) (Auto)  74 % (31-73)   


 


Lymphocytes (%) (Auto)  18 % (24-48)   


 


Monocytes (%) (Auto)  6 % (0-9)   


 


Eosinophils (%) (Auto)  1 % (0-3)   


 


Basophils (%) (Auto)  0 % (0-3)   


 


Neutrophils # (Auto)


 


 6.5 x10^3/uL


(1.8-7.7) 


 





 


Lymphocytes # (Auto)


 


 1.6 x10^3/uL


(1.0-4.8) 


 





 


Monocytes # (Auto)


 


 0.6 x10^3/uL


(0.0-1.1) 


 





 


Eosinophils # (Auto)


 


 0.1 x10^3/uL


(0.0-0.7) 


 





 


Basophils # (Auto)


 


 0.0 x10^3/uL


(0.0-0.2) 


 





 


Sodium Level


 


 146 mmol/L


(136-145) 


 





 


Potassium Level


 


 3.9 mmol/L


(3.5-5.1) 


 





 


Chloride Level


 


 114 mmol/L


() 


 





 


Carbon Dioxide Level


 


 25 mmol/L


(21-32) 


 





 


Anion Gap  7 (6-14)   


 


Blood Urea Nitrogen


 


 29 mg/dL


(7-20) 


 





 


Creatinine


 


 0.8 mg/dL


(0.6-1.0) 


 





 


Estimated GFR


(Cockcroft-Gault) 


 76.2 


 


 





 


BUN/Creatinine Ratio  36 (6-20)   


 


Glucose Level


 


 176 mg/dL


(70-99) 


 





 


Calcium Level


 


 9.8 mg/dL


(8.5-10.1) 


 





 


Total Bilirubin


 


 0.4 mg/dL


(0.2-1.0) 


 





 


Aspartate Amino Transf


(AST/SGOT) 


 22 U/L (15-37) 


 


 





 


Alanine Aminotransferase


(ALT/SGPT) 


 16 U/L (14-59) 


 


 





 


Alkaline Phosphatase


 


 83 U/L


() 


 





 


Total Protein


 


 5.1 g/dL


(6.4-8.2) 


 





 


Albumin


 


 1.1 g/dL


(3.4-5.0) 


 





 


Albumin/Globulin Ratio  0.3 (1.0-1.7)   








Laboratory Tests








Test


 6/12/20


06:05 6/12/20


06:18 6/12/20


12:05


 


White Blood Count


 8.7 x10^3/uL


(4.0-11.0) 


 





 


Red Blood Count


 2.56 x10^6/uL


(3.50-5.40) 


 





 


Hemoglobin


 7.6 g/dL


(12.0-15.5) 


 





 


Hematocrit


 22.9 %


(36.0-47.0) 


 





 


Mean Corpuscular Volume 90 fL ()   


 


Mean Corpuscular Hemoglobin 30 pg (25-35)   


 


Mean Corpuscular Hemoglobin


Concent 33 g/dL


(31-37) 


 





 


Red Cell Distribution Width


 16.6 %


(11.5-14.5) 


 





 


Platelet Count


 349 x10^3/uL


(140-400) 


 





 


Neutrophils (%) (Auto) 74 % (31-73)   


 


Lymphocytes (%) (Auto) 18 % (24-48)   


 


Monocytes (%) (Auto) 6 % (0-9)   


 


Eosinophils (%) (Auto) 1 % (0-3)   


 


Basophils (%) (Auto) 0 % (0-3)   


 


Neutrophils # (Auto)


 6.5 x10^3/uL


(1.8-7.7) 


 





 


Lymphocytes # (Auto)


 1.6 x10^3/uL


(1.0-4.8) 


 





 


Monocytes # (Auto)


 0.6 x10^3/uL


(0.0-1.1) 


 





 


Eosinophils # (Auto)


 0.1 x10^3/uL


(0.0-0.7) 


 





 


Basophils # (Auto)


 0.0 x10^3/uL


(0.0-0.2) 


 





 


Sodium Level


 146 mmol/L


(136-145) 


 





 


Potassium Level


 3.9 mmol/L


(3.5-5.1) 


 





 


Chloride Level


 114 mmol/L


() 


 





 


Carbon Dioxide Level


 25 mmol/L


(21-32) 


 





 


Anion Gap 7 (6-14)   


 


Blood Urea Nitrogen


 29 mg/dL


(7-20) 


 





 


Creatinine


 0.8 mg/dL


(0.6-1.0) 


 





 


Estimated GFR


(Cockcroft-Gault) 76.2 


 


 





 


BUN/Creatinine Ratio 36 (6-20)   


 


Glucose Level


 176 mg/dL


(70-99) 


 





 


Calcium Level


 9.8 mg/dL


(8.5-10.1) 


 





 


Total Bilirubin


 0.4 mg/dL


(0.2-1.0) 


 





 


Aspartate Amino Transf


(AST/SGOT) 22 U/L (15-37) 


 


 





 


Alanine Aminotransferase


(ALT/SGPT) 16 U/L (14-59) 


 


 





 


Alkaline Phosphatase


 83 U/L


() 


 





 


Total Protein


 5.1 g/dL


(6.4-8.2) 


 





 


Albumin


 1.1 g/dL


(3.4-5.0) 


 





 


Albumin/Globulin Ratio 0.3 (1.0-1.7)   


 


Glucose (Fingerstick)


 


 165 mg/dL


(70-99) 158 mg/dL


(70-99)








Problem List


Problems


Medical Problems:


(1) Acute pancreatitis


Status: Acute  





(2) Cholelithiasis


Status: Acute  








Assessment/Plan


severe pancreatitis


cont supportive care


tentatively plan surgical intervention in a few weeks, if continued improvement.





Justicifation of Admission Dx:


Justifications for Admission:


Justification of Admission Dx:  Yes











SAURAV NASH APRN            Jun 12, 2020 12:17

## 2020-06-12 NOTE — PDOC
PULMONARY PROGRESS NOTES


Subjective


Patient intubated on 3/23 , s/p trach 4/6, 


transferred back to ICU 6/5 for syncope with collapse


developed anemia, blood drainage from RLQ abdomen drain site, and surrounding 

firmness  / developed septic shock 6/7 from abdomen source, required levo 6/7


s/p 3 new drains 6/7 with brown color drainage


was place on AVAPS 6/7 post  


continues to have Dark blood / bloody drainage from ROBERT drains, 1000cc in past 24

hrs . on Trach sheild 40%, off pressor


Vitals





Vital Signs








  Date Time  Temp Pulse Resp B/P (MAP) Pulse Ox O2 Delivery O2 Flow Rate FiO2


 


6/12/20 15:52      Trach Collar 10.0 


 


6/12/20 15:00  126 36 157/77 (103) 98   


 


6/12/20 13:00 98.8       





 98.8       








ROS:  No Nausea, No Chest Pain, No Abdominal Pain, No Increase Cough


General:  No acute distress, Lethargic


Lungs:  Other (diminshed in bases, Rhonci in LLL)


Cardiovascular:  S1, S2


Abdomen:  Soft, Non-tender, Other (bleeding from Sx. site RLQ and firmness)


Extremities:  Other (+1 BLE edema)


Skin:  Warm, Dry


Labs





Laboratory Tests








Test


 6/10/20


18:37 6/10/20


23:30 6/11/20


06:02 6/11/20


06:15


 


Glucose (Fingerstick)


 152 mg/dL


(70-99) 145 mg/dL


(70-99) 167 mg/dL


(70-99) 





 


White Blood Count


 


 


 


 9.8 x10^3/uL


(4.0-11.0)


 


Red Blood Count


 


 


 


 2.90 x10^6/uL


(3.50-5.40)


 


Hemoglobin


 


 


 


 8.6 g/dL


(12.0-15.5)


 


Hematocrit


 


 


 


 26.2 %


(36.0-47.0)


 


Mean Corpuscular Volume    91 fL () 


 


Mean Corpuscular Hemoglobin    30 pg (25-35) 


 


Mean Corpuscular Hemoglobin


Concent 


 


 


 33 g/dL


(31-37)


 


Red Cell Distribution Width


 


 


 


 17.4 %


(11.5-14.5)


 


Platelet Count


 


 


 


 335 x10^3/uL


(140-400)


 


Neutrophils (%) (Auto)    70 % (31-73) 


 


Lymphocytes (%) (Auto)    24 % (24-48) 


 


Monocytes (%) (Auto)    5 % (0-9) 


 


Eosinophils (%) (Auto)    1 % (0-3) 


 


Basophils (%) (Auto)    0 % (0-3) 


 


Neutrophils # (Auto)


 


 


 


 6.9 x10^3/uL


(1.8-7.7)


 


Lymphocytes # (Auto)


 


 


 


 2.4 x10^3/uL


(1.0-4.8)


 


Monocytes # (Auto)


 


 


 


 0.5 x10^3/uL


(0.0-1.1)


 


Eosinophils # (Auto)


 


 


 


 0.1 x10^3/uL


(0.0-0.7)


 


Basophils # (Auto)


 


 


 


 0.0 x10^3/uL


(0.0-0.2)


 


Sodium Level


 


 


 


 147 mmol/L


(136-145)


 


Potassium Level


 


 


 


 4.1 mmol/L


(3.5-5.1)


 


Chloride Level


 


 


 


 115 mmol/L


()


 


Carbon Dioxide Level


 


 


 


 22 mmol/L


(21-32)


 


Anion Gap    10 (6-14) 


 


Blood Urea Nitrogen


 


 


 


 32 mg/dL


(7-20)


 


Creatinine


 


 


 


 0.9 mg/dL


(0.6-1.0)


 


Estimated GFR


(Cockcroft-Gault) 


 


 


 66.5 





 


BUN/Creatinine Ratio    36 (6-20) 


 


Glucose Level


 


 


 


 181 mg/dL


(70-99)


 


Calcium Level


 


 


 


 9.9 mg/dL


(8.5-10.1)


 


Phosphorus Level


 


 


 


 4.0 mg/dL


(2.6-4.7)


 


Magnesium Level


 


 


 


 1.8 mg/dL


(1.8-2.4)


 


Total Bilirubin


 


 


 


 0.4 mg/dL


(0.2-1.0)


 


Aspartate Amino Transf


(AST/SGOT) 


 


 


 21 U/L (15-37) 





 


Alanine Aminotransferase


(ALT/SGPT) 


 


 


 17 U/L (14-59) 





 


Alkaline Phosphatase


 


 


 


 91 U/L


()


 


Total Protein


 


 


 


 5.2 g/dL


(6.4-8.2)


 


Albumin


 


 


 


 1.3 g/dL


(3.4-5.0)


 


Albumin/Globulin Ratio    0.3 (1.0-1.7) 


 


Triglycerides Level


 


 


 


 203 mg/dL


(0-150)


 


Test


 6/11/20


11:32 6/12/20


06:05 6/12/20


06:18 6/12/20


12:05


 


Glucose (Fingerstick)


 163 mg/dL


(70-99) 


 165 mg/dL


(70-99) 158 mg/dL


(70-99)


 


White Blood Count


 


 8.7 x10^3/uL


(4.0-11.0) 


 





 


Red Blood Count


 


 2.56 x10^6/uL


(3.50-5.40) 


 





 


Hemoglobin


 


 7.6 g/dL


(12.0-15.5) 


 





 


Hematocrit


 


 22.9 %


(36.0-47.0) 


 





 


Mean Corpuscular Volume  90 fL ()   


 


Mean Corpuscular Hemoglobin  30 pg (25-35)   


 


Mean Corpuscular Hemoglobin


Concent 


 33 g/dL


(31-37) 


 





 


Red Cell Distribution Width


 


 16.6 %


(11.5-14.5) 


 





 


Platelet Count


 


 349 x10^3/uL


(140-400) 


 





 


Neutrophils (%) (Auto)  74 % (31-73)   


 


Lymphocytes (%) (Auto)  18 % (24-48)   


 


Monocytes (%) (Auto)  6 % (0-9)   


 


Eosinophils (%) (Auto)  1 % (0-3)   


 


Basophils (%) (Auto)  0 % (0-3)   


 


Neutrophils # (Auto)


 


 6.5 x10^3/uL


(1.8-7.7) 


 





 


Lymphocytes # (Auto)


 


 1.6 x10^3/uL


(1.0-4.8) 


 





 


Monocytes # (Auto)


 


 0.6 x10^3/uL


(0.0-1.1) 


 





 


Eosinophils # (Auto)


 


 0.1 x10^3/uL


(0.0-0.7) 


 





 


Basophils # (Auto)


 


 0.0 x10^3/uL


(0.0-0.2) 


 





 


Sodium Level


 


 146 mmol/L


(136-145) 


 





 


Potassium Level


 


 3.9 mmol/L


(3.5-5.1) 


 





 


Chloride Level


 


 114 mmol/L


() 


 





 


Carbon Dioxide Level


 


 25 mmol/L


(21-32) 


 





 


Anion Gap  7 (6-14)   


 


Blood Urea Nitrogen


 


 29 mg/dL


(7-20) 


 





 


Creatinine


 


 0.8 mg/dL


(0.6-1.0) 


 





 


Estimated GFR


(Cockcroft-Gault) 


 76.2 


 


 





 


BUN/Creatinine Ratio  36 (6-20)   


 


Glucose Level


 


 176 mg/dL


(70-99) 


 





 


Calcium Level


 


 9.8 mg/dL


(8.5-10.1) 


 





 


Total Bilirubin


 


 0.4 mg/dL


(0.2-1.0) 


 





 


Aspartate Amino Transf


(AST/SGOT) 


 22 U/L (15-37) 


 


 





 


Alanine Aminotransferase


(ALT/SGPT) 


 16 U/L (14-59) 


 


 





 


Alkaline Phosphatase


 


 83 U/L


() 


 





 


Total Protein


 


 5.1 g/dL


(6.4-8.2) 


 





 


Albumin


 


 1.1 g/dL


(3.4-5.0) 


 





 


Albumin/Globulin Ratio  0.3 (1.0-1.7)   


 


Test


 6/12/20


12:45 


 


 





 


White Blood Count


 7.0 x10^3/uL


(4.0-11.0) 


 


 





 


Red Blood Count


 2.99 x10^6/uL


(3.50-5.40) 


 


 





 


Hemoglobin


 8.5 g/dL


(12.0-15.5) 


 


 





 


Hematocrit


 25.7 %


(36.0-47.0) 


 


 





 


Mean Corpuscular Volume 86 fL ()    


 


Mean Corpuscular Hemoglobin 29 pg (25-35)    


 


Mean Corpuscular Hemoglobin


Concent 33 g/dL


(31-37) 


 


 





 


Red Cell Distribution Width


 19.7 %


(11.5-14.5) 


 


 





 


Platelet Count


 347 x10^3/uL


(140-400) 


 


 











Laboratory Tests








Test


 6/12/20


06:05 6/12/20


06:18 6/12/20


12:05 6/12/20


12:45


 


White Blood Count


 8.7 x10^3/uL


(4.0-11.0) 


 


 7.0 x10^3/uL


(4.0-11.0)


 


Red Blood Count


 2.56 x10^6/uL


(3.50-5.40) 


 


 2.99 x10^6/uL


(3.50-5.40)


 


Hemoglobin


 7.6 g/dL


(12.0-15.5) 


 


 8.5 g/dL


(12.0-15.5)


 


Hematocrit


 22.9 %


(36.0-47.0) 


 


 25.7 %


(36.0-47.0)


 


Mean Corpuscular Volume 90 fL ()    86 fL () 


 


Mean Corpuscular Hemoglobin 30 pg (25-35)    29 pg (25-35) 


 


Mean Corpuscular Hemoglobin


Concent 33 g/dL


(31-37) 


 


 33 g/dL


(31-37)


 


Red Cell Distribution Width


 16.6 %


(11.5-14.5) 


 


 19.7 %


(11.5-14.5)


 


Platelet Count


 349 x10^3/uL


(140-400) 


 


 347 x10^3/uL


(140-400)


 


Neutrophils (%) (Auto) 74 % (31-73)    


 


Lymphocytes (%) (Auto) 18 % (24-48)    


 


Monocytes (%) (Auto) 6 % (0-9)    


 


Eosinophils (%) (Auto) 1 % (0-3)    


 


Basophils (%) (Auto) 0 % (0-3)    


 


Neutrophils # (Auto)


 6.5 x10^3/uL


(1.8-7.7) 


 


 





 


Lymphocytes # (Auto)


 1.6 x10^3/uL


(1.0-4.8) 


 


 





 


Monocytes # (Auto)


 0.6 x10^3/uL


(0.0-1.1) 


 


 





 


Eosinophils # (Auto)


 0.1 x10^3/uL


(0.0-0.7) 


 


 





 


Basophils # (Auto)


 0.0 x10^3/uL


(0.0-0.2) 


 


 





 


Sodium Level


 146 mmol/L


(136-145) 


 


 





 


Potassium Level


 3.9 mmol/L


(3.5-5.1) 


 


 





 


Chloride Level


 114 mmol/L


() 


 


 





 


Carbon Dioxide Level


 25 mmol/L


(21-32) 


 


 





 


Anion Gap 7 (6-14)    


 


Blood Urea Nitrogen


 29 mg/dL


(7-20) 


 


 





 


Creatinine


 0.8 mg/dL


(0.6-1.0) 


 


 





 


Estimated GFR


(Cockcroft-Gault) 76.2 


 


 


 





 


BUN/Creatinine Ratio 36 (6-20)    


 


Glucose Level


 176 mg/dL


(70-99) 


 


 





 


Calcium Level


 9.8 mg/dL


(8.5-10.1) 


 


 





 


Total Bilirubin


 0.4 mg/dL


(0.2-1.0) 


 


 





 


Aspartate Amino Transf


(AST/SGOT) 22 U/L (15-37) 


 


 


 





 


Alanine Aminotransferase


(ALT/SGPT) 16 U/L (14-59) 


 


 


 





 


Alkaline Phosphatase


 83 U/L


() 


 


 





 


Total Protein


 5.1 g/dL


(6.4-8.2) 


 


 





 


Albumin


 1.1 g/dL


(3.4-5.0) 


 


 





 


Albumin/Globulin Ratio 0.3 (1.0-1.7)    


 


Glucose (Fingerstick)


 


 165 mg/dL


(70-99) 158 mg/dL


(70-99) 











Medications





Active Scripts








 Medications  Dose


 Route/Sig


 Max Daily Dose Days Date Category


 


 Bisoprolol


 Fumarate 5 Mg


 Tablet  10 Mg


 PO DAILY


   3/16/20 Reported








Comments


CXR 6/6/2020








IMPRESSION: Lines and tubes as described above. Left greater than right 


lower lobe opacities most likely pulmonary edema and left greater than 


right mild pleural effusions are stable. Superimposed left lower lobe 


pneumonia is not excluded.





ct abdomen /pelvis 6/6


1. Removal of the percutaneous pigtail drainage catheters since the prior 


exam. Sequela of pancreatitis with extensive pseudocysts again 


demonstrated, the right-sided collections are slightly larger since the 


prior exam, the left-sided collections are stable. See above.


2. Moderate to large left pleural effusion with atelectasis and collapse 


of most of the left lower lobe, stable. Small right pleural effusion is 


stable.


3. Gallstone.





Impression


.


IMPRESSION:


1.  Acute hypoxemic respiratory failure secondary to ARDS status post trach, 

developed anemia 6/7, blood drainage from RLQ abdomen drain site, and 

surrounding firmness  / developed septic shock 6/7 from abdomen source, required

levo 6/7


s/p 3 new drains 6/7 with brown color drainage,was placed on AVAPS 6/7 post 

procedure after receiving narcotics. back on TS now at 40 % Bloody drainage 

again from ROBERT drains / 1000 cc in last 24 hr 


2.  Gallstone pancreatitis, now with ongoing bleeding from prior drain. Anemic. 

s/p Tx multiple units over several days


3.  septic shock, recurrent 6/7, source abdomen. , off levo now, 


4.  Acute kidney injury-, Off HD--renal function decling. suspect JED on CKD due

to hypotension , improved now


5.  Acute gallstone pancreatitis.


6.  Hypoalbuminemia.


7.  Moderate persistent effusions, s/p left thora  5/12, reaccumulation of left 

effusion. O2 requirement not changed. 


8.  New Fever- ,hypotension. suspect recurrent sepsis/ likely pancreatic source.

 Per ID, per surgery--


9.  Chronic anemia-- ongoing / s/p PRBC


10. Covid 19 testing negative


11. Moderate to large ascites-S/P paracentisis


12.S/P paracentisis with 4 liters removed on 4/15/20


13. S/P IR drain placement on 5/8/2020, removal


14. Depression/Anxiety 


1





Plan


.


1. On trach shield--  PMV/capping as tolerated/ prn suction.


2. Follow all cultures/ PSA from pelvic drains. Abx per ID


3. Follow surgery recs-- Acute pancreatitis with persistent necrosis ---- 4/27 

status post ROBERT drain placement + C paropsilosis; 5/6 + yeast & high amylase; s/p

additional drains on 5/8, removal of drains and now increase pseudocyst and 

increase fluid collection. likely infected fluid/ sepsis. continues to have 

bloody drainage thru drains . 1000 cc in last 24 hrs .Initiated BS abx.follow 

surgery rec, planning surgical intervention next few weeks


4. Follow ID recs for ABX----


5. Follow nephrology recs -----


6. Continue TPN 


7. monitor HGB,  4 units since 6/6--monitor HGB transfuse if less than 8.5 


8. hold off on thoracentesis . effusion small to moderate , monitor for now


10. s/p  thoracentesis, 5/12, 3 litres removed


11. Pt remains critically ill from severe necrotic pancreatis. Even with surgery

prognosis grim. Surgery to inform family.





     


DVT/GI PPX: protonix--- holding lovenox 2/2 anemia


PT/OT


D/W SHARYN STEWART MD                 Jun 12, 2020 16:19

## 2020-06-12 NOTE — NUR
SS following up with discharge planning. SS reviewed pt chart and discussed with pt RN. Per 
RN, staff has pulled 1055 cc's out of drains within 24 hour period. Pt continues on trach 
collar and refuses to wear trach cap. Pt on TPN, IV Meropenem, and IV Micafungin. Pt has 
drains x3 and is Max Assist with PT/OT and staff. Possible surgery with Dr. Flores in a few 
weeks. SS will continue to follow for discharge planning.

## 2020-06-12 NOTE — PDOC
Infectious Disease Note


Subjective:


Subjective


Patient lethargic, weak


 on trach shield


Continues to have blood stained drainage from drains


no fevers last 24 hours


D/W RN


Patient vomited yesterday


Tried to pull out her trach





Vital Signs:


Vital Signs





Vital Signs








  Date Time  Temp Pulse Resp B/P (MAP) Pulse Ox O2 Delivery O2 Flow Rate FiO2


 


6/12/20 07:42      Trach Collar 10.0 


 


6/12/20 07:00  128 31 130/60 (83) 100   


 


6/12/20 04:00 98.9       





 98.9       











Physical Exam:


PHYSICAL EXAM


GENERAL: Lethargic, opens eyes transiently


HEENT: NGT in place. Oral mucosa dry


NECK:  Tracheostomy 


LUNGS: Diminished aeration bases, no accessory muscle use 


HEART:  S1, S2, tachy, regular 


ABDOMEN: Mild distention, bowel sounds present, soft, grimaces to palpation 


Right lateral side drainage bag, 3 drains with bloodstained drainage


: [place] ( 6/ 7)


EXTREMITIES: Trace edema .no  cyanosis 


SKIN: no signs of gen rash 


CNS: Lethargic very weak


LUE-PICC (5/29) without signs of complications





Medications:


Inpatient Meds:





Current Medications








 Medications


  (Trade)  Dose


 Ordered  Sig/Yee  Start Time


 Stop Time Status Last Admin


Dose Admin


 


 Acetaminophen


  (Tylenol Supp)  650 mg  PRN Q6HRS  PRN  3/24/20 10:30


    6/10/20 22:16


650 MG


 


 Acetaminophen


  (Tylenol)  650 mg  PRN Q6HRS  PRN  3/21/20 03:36


 5/13/20 10:25 DC 4/16/20 19:56


650 MG


 


 Albumin Human  500 ml @ 


 125 mls/hr  1X  ONCE  6/7/20 08:15


 6/7/20 12:14 DC 6/7/20 08:10


125 MLS/HR


 


 Albuterol Sulfate


  (Ventolin Neb


 Soln)  2.5 mg  1X  ONCE  3/17/20 22:30


 3/17/20 22:31 DC 3/18/20 00:56


2.5 MG


 


 Alteplase,


 Recombinant


  (Cathflo For


 Central Catheter


 Clearance)  1 mg  1X  ONCE  4/24/20 10:45


 4/24/20 10:46 DC 4/24/20 11:44


1 MG


 


 Amino Acids/


 Glycerin/


 Electrolytes  1,000 ml @ 


 75 mls/hr  [id number]  4/20/20 21:15


   UNV  





 


 Artificial Tears


  (Artificial


 Tears)  1 drop  PRN Q15MIN  PRN  4/29/20 05:30


    5/29/20 10:08


1 DROP


 


 Atenolol


  (Tenormin)  100 mg  DAILY  3/17/20 09:00


 3/16/20 20:08 DC  





 


 Atropine Sulfate


  (ATROPINE 0.5mg


 SYRINGE)  0.5 mg  PRN Q5MIN  PRN  4/2/20 08:15


     





 


 Barium Sulfate


  (Varibar Thin


 Liquid Apple)  148 gm  1X  ONCE  5/26/20 11:45


 5/26/20 11:49 DC  





 


 Benzocaine


  (Hurricaine One)  1 spray  1X  ONCE  3/20/20 14:30


 3/20/20 14:31 DC 3/20/20 16:38


1 SPRAY


 


 Bisacodyl


  (Dulcolax Supp)  10 mg  STK-MED ONCE  4/27/20 10:59


 4/27/20 10:59 DC  





 


 Bumetanide


  (Bumex)  2 mg  DAILY  5/8/20 10:00


 5/18/20 17:15 DC 5/18/20 08:07


2 MG


 


 Bupivacaine HCl/


 Epinephrine Bitart


  (Sensorcain-Epi


 0.5%-1:226884 Mpf)  30 ml  STK-MED ONCE  4/27/20 10:58


 4/27/20 10:58 DC 4/27/20 12:01


7 ML


 


 Calcium Carbonate/


 Glycine


  (Tums)  500 mg  PRN [id number]  PRN  3/18/20 17:45


 5/13/20 10:25 DC  





 


 Calcium Chloride


 1000 mg/Sodium


 Chloride  110 ml @ 


 220 mls/hr  1X  ONCE  3/17/20 22:30


 3/17/20 22:59 DC 3/17/20 22:11


220 MLS/HR


 


 Calcium Chloride


 3000 mg/Sodium


 Chloride  1,030 ml @ 


 50 mls/hr  [id number]  3/19/20 08:00


 3/21/20 15:23 DC 3/21/20 02:17


50 MLS/HR


 


 Calcium Gluconate


  (Calcium


 Gluconate)  2,000 mg  1X  ONCE  3/19/20 02:15


 3/19/20 02:16 DC 3/19/20 02:19


2,000 MG


 


 Calcium Gluconate


 1000 mg/Sodium


 Chloride  110 ml @ 


 220 mls/hr  1X  ONCE  3/18/20 03:30


 3/18/20 03:59 DC 3/18/20 03:21


220 MLS/HR


 


 Calcium Gluconate


 2000 mg/Sodium


 Chloride  120 ml @ 


 220 mls/hr  1X  ONCE  3/18/20 07:30


 3/18/20 08:02 DC 3/18/20 09:05


220 MLS/HR


 


 Cefepime HCl


  (Maxipime)  2 gm  Q12HR  3/25/20 09:00


 4/8/20 09:58 DC 4/7/20 20:56


2 GM


 


 Cellulose


  (Surgicel


 Fibrillar 1x2)  1 each  STK-MED ONCE  4/6/20 11:00


 4/6/20 11:01 DC  





 


 Cellulose


  (Surgicel


 Hemostat 2x14)  1 each  STK-MED ONCE  4/27/20 10:58


 4/27/20 10:59 DC  





 


 Cellulose


  (Surgicel


 Hemostat 4x8)  1 each  STK-MED ONCE  4/27/20 10:58


 4/27/20 10:59 DC  





 


 Cyclobenzaprine


 HCl


  (Flexeril)  10 mg  PRN Q6HRS  PRN  4/30/20 10:45


     





 


 Daptomycin 410 mg/


 Sodium Chloride  50 ml @ 


 100 mls/hr  Q24H  6/7/20 14:00


 6/10/20 08:30 DC 6/9/20 13:33


100 MLS/HR


 


 Daptomycin 430 mg/


 Sodium Chloride  50 ml @ 


 100 mls/hr  Q24H  4/25/20 13:00


 4/30/20 20:58 DC 4/30/20 13:00


100 MLS/HR


 


 Daptomycin 450 mg/


 Sodium Chloride  50 ml @ 


 100 mls/hr  Q24H  5/17/20 09:00


 5/21/20 08:30 DC 5/20/20 09:25


100 MLS/HR


 


 Daptomycin 485 mg/


 Sodium Chloride  50 ml @ 


 100 mls/hr  Q24H  5/4/20 11:00


 5/12/20 07:44 DC 5/11/20 13:10


100 MLS/HR


 


 Daptomycin 500 mg/


 Sodium Chloride  50 ml @ 


 100 mls/hr  Q48H  3/25/20 08:30


 4/10/20 10:07 DC 4/10/20 09:57


100 MLS/HR


 


 Dexamethasone


 Sodium Phosphate


  (Decadron)  4 mg  STK-MED ONCE  4/27/20 10:56


 4/27/20 10:57 DC  





 


 Dexmedetomidine


 HCl 400 mcg/


 Sodium Chloride  100 ml @ 0


 mls/hr  CONT  PRN  4/2/20 08:15


 5/30/20 18:31 DC 5/30/20 12:57


8 MLS/HR


 


 Dextrose


  (Dextrose


 50%-Water Syringe)  12.5 gm  PRN Q15MIN  PRN  3/16/20 09:30


     





 


 Digoxin


  (Lanoxin)  125 mcg  1X  ONCE  3/19/20 18:00


 3/19/20 18:01 DC 3/19/20 17:10


125 MCG


 


 Diphenhydramine


 HCl


  (Benadryl)  25 mg  1X PRN  PRN  4/24/20 15:45


 4/25/20 15:44 DC  





 


 Duloxetine HCl


  (Cymbalta)  30 mg  DAILY  5/10/20 14:00


 5/13/20 10:25 DC 5/11/20 09:48


30 MG


 


 Enoxaparin Sodium


  (Lovenox 100mg


 Syringe)  100 mg  Q12HR  4/21/20 21:00


   UNV  





 


 Enoxaparin Sodium


  (Lovenox 40mg


 Syringe)  40 mg  Q24H  5/17/20 17:00


 6/7/20 06:50 DC 6/5/20 17:44


40 MG


 


 Etomidate


  (Amidate)  8 mg  1X  ONCE  3/23/20 08:30


 3/23/20 08:31 DC 3/23/20 08:33


8 MG


 


 Fentanyl


  (Duragesic 50mcg/


 Hr Patch)  1 patch  Q72H  6/4/20 21:00


    6/4/20 21:22


1 PATCH


 


 Fentanyl Citrate


  (Fentanyl 2ml


 Vial)  100 mcg  1X  ONCE  6/7/20 15:00


 6/7/20 15:01 DC 6/7/20 15:28


50 MCG


 


 Fentanyl Citrate


  (Fentanyl 5ml


 Vial)  250 mcg  1X  ONCE  5/8/20 09:15


 5/8/20 09:16 DC 5/8/20 09:30


50 MCG


 


 Flumazenil


  (Romazicon)  0.5 mg  STK-MED ONCE  6/7/20 14:48


 6/7/20 14:48 DC  





 


 Fluoxetine HCl


  (PROzac)  20 mg  QHS  6/4/20 21:00


    6/11/20 23:22


20 MG


 


 Furosemide


  (Lasix)  40 mg  DAILY  6/3/20 13:30


 6/7/20 09:12 DC 6/5/20 11:14


40 MG


 


 Haloperidol


 Lactate


  (Haldol Inj)  3 mg  1X  ONCE  5/4/20 14:30


 5/4/20 14:31 DC 5/4/20 14:37


3 MG


 


 Heparin Sodium


  (Porcine)


  (Hep Lock Adult)  500 unit  STK-MED ONCE  4/7/20 09:29


 4/7/20 09:30 DC  





 


 Heparin Sodium


  (Porcine)


  (Heparin Sodium)  5,000 unit  Q12HR  4/27/20 21:00


 5/7/20 09:59 DC 5/6/20 20:57


5,000 UNIT


 


 Heparin Sodium


  (Porcine) 1000


 unit/Sodium


 Chloride  1,001 ml @ 


 1,001 mls/hr  1X  ONCE  4/27/20 12:00


 4/27/20 12:59 DC  





 


 Hydromorphone HCl


  (Dilaudid


 Standard PCA)  12 mg  STK-MED ONCE  5/1/20 15:50


 5/12/20 11:24 DC  





 


 Hydromorphone HCl


  (Dilaudid)  1 mg  PRN Q4HRS  PRN  5/4/20 19:00


 5/18/20 17:10 DC 5/18/20 06:25


1 MG


 


 Info


  (CONTRAST GIVEN


 -- Rx MONITORING)  1 each  PRN DAILY  PRN  3/30/20 11:45


 4/1/20 11:44 DC  





 


 Info


  (Icu Electrolyte


 Protocol)  1 ea  CONT PRN  PRN  3/29/20 13:15


     





 


 Info


  (PHARMACY


 MONITORING -- do


 not chart)  1 each  PRN DAILY  PRN  4/24/20 15:45


 5/26/20 14:14 DC  





 


 Info


  (Tpn Per


 Pharmacy)  1 each  PRN DAILY  PRN  3/18/20 12:30


   UNV  





 


 Insulin Human


 Lispro


  (HumaLOG)  0-9 UNITS  Q6HRS  3/16/20 09:30


    6/12/20 06:22


4 UNITS


 


 Insulin Human


 Regular


  (HumuLIN R VIAL)  5 unit  1X  ONCE  3/17/20 22:30


 3/17/20 22:31 DC 3/17/20 22:14


5 UNIT


 


 Iohexol


  (Omnipaque 240


 Mg/ml)  30 ml  1X  ONCE  3/30/20 11:30


 3/30/20 11:33 DC 3/30/20 11:30


30 ML


 


 Iohexol


  (Omnipaque 300


 Mg/ml)  50 ml  STK-MED ONCE  4/27/20 10:58


 4/27/20 10:59 DC  





 


 Iohexol


  (Omnipaque 350


 Mg/ml)  90 ml  1X  ONCE  3/16/20 03:30


 3/16/20 03:31 DC 3/16/20 03:25


90 ML


 


 Ketorolac


 Tromethamine


  (Toradol 30mg


 Vial)  30 mg  1X  ONCE  3/16/20 03:00


 3/16/20 03:01 DC 3/16/20 02:54


30 MG


 


 Lidocaine HCl


  (Buffered


 Lidocaine 1%)  6 ml  1X  ONCE  5/12/20 14:15


 5/12/20 14:16 DC 5/12/20 14:15


3 ML


 


 Lidocaine HCl


  (Glydo


  (Lidocaine) Jelly)  1 thomas  1X  ONCE  3/20/20 14:30


 3/20/20 14:31 DC 3/20/20 16:38


1 THOMAS


 


 Lidocaine HCl


  (Lidocaine 1%


 20ml Vial)  20 ml  1X  ONCE  6/7/20 15:00


 6/7/20 15:01 DC 6/7/20 15:30


20 ML


 


 Lidocaine HCl


  (Xylocaine-Mpf


 1% 2ml Vial)  2 ml  PRN 1X  PRN  4/27/20 07:00


 4/28/20 06:59 DC  





 


 Linezolid/Dextrose  300 ml @ 


 300 mls/hr  Q12HR  5/17/20 09:00


 5/20/20 08:11 DC 5/19/20 21:08


300 MLS/HR


 


 Lorazepam


  (Ativan Inj)  0.25 mg  PRN Q4HRS  PRN  6/3/20 07:30


    6/11/20 05:53


0.25 MG


 


 Magnesium Sulfate  50 ml @ 25


 mls/hr  1X  ONCE  5/10/20 09:00


 5/10/20 10:59 DC 5/10/20 10:44


25 MLS/HR


 


 Meropenem 1 gm/


 Sodium Chloride  100 ml @ 


 200 mls/hr  Q12HR  6/7/20 21:00


    6/11/20 23:22


200 MLS/HR


 


 Meropenem 500 mg/


 Sodium Chloride  50 ml @ 


 100 mls/hr  Q6HRS  5/25/20 18:00


 5/27/20 09:59 DC 5/27/20 06:02


100 MLS/HR


 


 Metoclopramide HCl


  (Reglan Vial)  10 mg  PRN Q3HRS  PRN  5/9/20 16:45


    5/14/20 04:25


10 MG


 


 Metoprolol


 Tartrate


  (Lopressor Vial)  5 mg  PRN Q6HRS  PRN  6/10/20 09:00


    6/11/20 14:33


5 MG


 


 Metronidazole  100 ml @ 


 100 mls/hr  Q8HRS  4/14/20 10:00


 4/21/20 08:10 DC 4/21/20 06:04


100 MLS/HR


 


 Micafungin Sodium


 100 mg/Dextrose  100 ml @ 


 100 mls/hr  Q24H  6/7/20 11:00


    6/11/20 10:04


100 MLS/HR


 


 Midazolam HCl


  (Versed)  2 mg  1X  ONCE  6/7/20 15:00


 6/7/20 15:01 DC 6/7/20 15:28


1 MG


 


 Midazolam HCl 100


 mg/Sodium Chloride  100 ml @ 7


 mls/hr  CONT  PRN  3/28/20 16:00


 6/3/20 14:38 DC 4/8/20 15:35


7 MLS/HR


 


 Midazolam HCl 50


 mg/Sodium Chloride  50 ml @ 0


 mls/hr  CONT  PRN  3/23/20 08:15


 3/28/20 15:59 DC 3/26/20 22:39


7 MLS/HR


 


 Morphine Sulfate


  (Morphine


 Sulfate)  2 mg  PRN Q2HR  PRN  3/16/20 05:00


 3/17/20 14:15 DC 3/17/20 12:26


2 MG


 


 Multi-Ingred


 Cream/Lotion/Oil/


 Oint


  (Artificial


 Tears Eye


 Ointment)  1 thomas  PRN Q1HR  PRN  3/25/20 17:30


 6/3/20 14:39 DC 4/13/20 08:19


1 THOMAS


 


 Naloxone HCl


  (Narcan)  0.4 mg  STK-MED ONCE  6/7/20 14:48


 6/7/20 14:48 DC  





 


 Norepinephrine


 Bitartrate 8 mg/


 Dextrose  258 ml @ 


 13.332 mls/


 hr  CONT  PRN  6/7/20 06:30


    6/7/20 21:46


13.332 MLS/HR


 


 Ondansetron HCl


  (Zofran)  4 mg  STK-MED ONCE  4/27/20 10:56


 4/27/20 10:57 DC  





 


 Pantoprazole


 Sodium


  (PROTONIX VIAL


 for IV PUSH)  40 mg  DAILYAC  3/16/20 11:30


    6/11/20 09:22


40 MG


 


 Phenylephrine HCl


  (PHENYLEPHRINE


 in 0.9% NACL PF)  1 mg  STK-MED ONCE  4/27/20 12:34


 4/27/20 12:34 DC  





 


 Piperacillin Sod/


 Tazobactam Sod


 3.375 gm/Sodium


 Chloride  50 ml @ 


 100 mls/hr  Q6HRS  5/27/20 12:00


 6/4/20 07:26 DC 6/4/20 06:10


100 MLS/HR


 


 Piperacillin Sod/


 Tazobactam Sod


 4.5 gm/Sodium


 Chloride  100 ml @ 


 200 mls/hr  1X  ONCE  3/16/20 06:00


 3/16/20 06:29 DC 3/16/20 05:44


200 MLS/HR


 


 Potassium


 Chloride 110 meq/


 Magnesium Sulfate


 20 meq/


 Multivitamins 10


 ml/Chromium/


 Copper/Manganese/


 Seleni/Zn 1 ml/


 Insulin Human


 Regular 15 unit/


 Total Parenteral


 Nutrition/Amino


 Acids/Dextrose/


 Fat Emulsion


 Intravenous  1,800 ml @ 


 75 mls/hr  TPN  CONT  5/24/20 22:00


 5/25/20 21:59 DC 5/24/20 22:48


75 MLS/HR


 


 Potassium


 Chloride 15 meq/


 Bicarbonate


 Dialysis Soln w/


 out KCl  5,007.5 ml


  @ 1,000 mls/


 hr  Q5H1M  3/29/20 20:00


 4/2/20 13:08 DC 4/1/20 18:14


1,000 MLS/HR


 


 Potassium


 Chloride 20 meq/


 Bicarbonate


 Dialysis Soln w/


 out KCl  5,010 ml @ 


 1,000 mls/hr  Q5H1M  3/25/20 16:00


 3/29/20 19:59 DC 3/29/20 14:54


1,000 MLS/HR


 


 Potassium


 Chloride 40 meq/


 Potassium Acetate


 60 meq/Magnesium


 Sulfate 10 meq/


 Multivitamins 10


 ml/Chromium/


 Copper/Manganese/


 Seleni/Zn 1 ml/


 Insulin Human


 Regular 20 unit/


 Total Parenteral


 Nutrition/Amino


 Acids/Dextrose/


 Fat Emulsion


 Intravenous  1,800 ml @ 


 75 mls/hr  TPN  CONT  6/4/20 22:00


 6/5/20 21:59 DC 6/5/20 00:03


75 MLS/HR


 


 Potassium


 Chloride 70 meq/


 Magnesium Sulfate


 20 meq/


 Multivitamins 10


 ml/Chromium/


 Copper/Manganese/


 Seleni/Zn 1 ml/


 Insulin Human


 Regular 15 unit/


 Total Parenteral


 Nutrition/Amino


 Acids/Dextrose/


 Fat Emulsion


 Intravenous  1,800 ml @ 


 75 mls/hr  TPN  CONT  5/29/20 22:00


 5/30/20 21:59 DC 5/29/20 23:13


75 MLS/HR


 


 Potassium


 Chloride 75 meq/


 Magnesium Sulfate


 15 meq/


 Multivitamins 10


 ml/Chromium/


 Copper/Manganese/


 Seleni/Zn 0.5 ml/


 Insulin Human


 Regular 15 unit/


 Total Parenteral


 Nutrition/Amino


 Acids/Dextrose/


 Fat Emulsion


 Intravenous  1,920 ml @ 


 80 mls/hr  TPN  CONT  5/9/20 22:00


 5/10/20 21:59 DC 5/9/20 22:41


80 MLS/HR


 


 Potassium


 Chloride 75 meq/


 Magnesium Sulfate


 15 meq/Calcium


 Gluconate 8 meq/


 Multivitamins 10


 ml/Chromium/


 Copper/Manganese/


 Seleni/Zn 0.5 ml/


 Insulin Human


 Regular 15 unit/


 Total Parenteral


 Nutrition/Amino


 Acids/Dextrose/


 Fat Emulsion


 Intravenous  1,920 ml @ 


 80 mls/hr  TPN  CONT  5/7/20 22:00


 5/8/20 21:59 DC 5/7/20 22:28


80 MLS/HR


 


 Potassium


 Chloride 75 meq/


 Magnesium Sulfate


 15 meq/Calcium


 Gluconate 8 meq/


 Multivitamins 10


 ml/Chromium/


 Copper/Manganese/


 Seleni/Zn 0.5 ml/


 Insulin Human


 Regular 20 unit/


 Total Parenteral


 Nutrition/Amino


 Acids/Dextrose/


 Fat Emulsion


 Intravenous  1,920 ml @ 


 80 mls/hr  TPN  CONT  5/6/20 22:00


 5/7/20 21:59 DC 5/6/20 22:00


80 MLS/HR


 


 Potassium


 Chloride 75 meq/


 Magnesium Sulfate


 15 meq/Calcium


 Gluconate 8 meq/


 Multivitamins 10


 ml/Chromium/


 Copper/Manganese/


 Seleni/Zn 0.5 ml/


 Insulin Human


 Regular 25 unit/


 Total Parenteral


 Nutrition/Amino


 Acids/Dextrose/


 Fat Emulsion


 Intravenous  1,920 ml @ 


 80 mls/hr  TPN  CONT  5/4/20 22:00


 5/5/20 21:59 DC 5/4/20 23:08


80 MLS/HR


 


 Potassium


 Chloride 75 meq/


 Magnesium Sulfate


 20 meq/Calcium


 Gluconate 10 meq/


 Multivitamins 10


 ml/Chromium/


 Copper/Manganese/


 Seleni/Zn 0.5 ml/


 Insulin Human


 Regular 25 unit/


 Total Parenteral


 Nutrition/Amino


 Acids/Dextrose/


 Fat Emulsion


 Intravenous  1,920 ml @ 


 80 mls/hr  TPN  CONT  5/3/20 22:00


 5/4/20 21:59 DC 5/3/20 22:04


80 MLS/HR


 


 Potassium


 Chloride 75 meq/


 Magnesium Sulfate


 20 meq/Calcium


 Gluconate 10 meq/


 Multivitamins 10


 ml/Chromium/


 Copper/Manganese/


 Seleni/Zn 0.5 ml/


 Insulin Human


 Regular 30 unit/


 Total Parenteral


 Nutrition/Amino


 Acids/Dextrose/


 Fat Emulsion


 Intravenous  1,920 ml @ 


 80 mls/hr  TPN  CONT  5/2/20 22:00


 5/3/20 22:00 DC 5/2/20 21:51


80 MLS/HR


 


 Potassium


 Chloride 80 meq/


 Magnesium Sulfate


 20 meq/


 Multivitamins 10


 ml/Chromium/


 Copper/Manganese/


 Seleni/Zn 0.5 ml/


 Insulin Human


 Regular 15 unit/


 Total Parenteral


 Nutrition/Amino


 Acids/Dextrose/


 Fat Emulsion


 Intravenous  1,920 ml @ 


 80 mls/hr  TPN  CONT  5/12/20 22:00


 5/13/20 21:59 DC 5/12/20 21:40


80 MLS/HR


 


 Potassium


 Chloride 80 meq/


 Magnesium Sulfate


 20 meq/


 Multivitamins 10


 ml/Chromium/


 Copper/Manganese/


 Seleni/Zn 1 ml/


 Insulin Human


 Regular 15 unit/


 Total Parenteral


 Nutrition/Amino


 Acids/Dextrose/


 Fat Emulsion


 Intravenous  1,800 ml @ 


 75 mls/hr  TPN  CONT  5/31/20 22:00


 6/1/20 21:59 DC 5/31/20 21:54


75 MLS/HR


 


 Potassium


 Chloride 90 meq/


 Magnesium Sulfate


 20 meq/


 Multivitamins 10


 ml/Chromium/


 Copper/Manganese/


 Seleni/Zn 1 ml/


 Insulin Human


 Regular 15 unit/


 Total Parenteral


 Nutrition/Amino


 Acids/Dextrose/


 Fat Emulsion


 Intravenous  1,800 ml @ 


 75 mls/hr  TPN  CONT  5/20/20 22:00


 5/21/20 21:59 DC 5/20/20 22:28


75 MLS/HR


 


 Potassium


 Chloride 90 meq/


 Magnesium Sulfate


 20 meq/


 Multivitamins 10


 ml/Chromium/


 Copper/Manganese/


 Seleni/Zn 1 ml/


 Insulin Human


 Regular 20 unit/


 Total Parenteral


 Nutrition/Amino


 Acids/Dextrose/


 Fat Emulsion


 Intravenous  1,800 ml @ 


 75 mls/hr  TPN  CONT  6/3/20 22:00


 6/4/20 21:59 DC 6/3/20 23:13


75 MLS/HR


 


 Potassium


 Chloride/Water  100 ml @ 


 100 mls/hr  1X  ONCE  6/1/20 10:00


 6/1/20 10:59 DC 6/1/20 10:15


100 MLS/HR


 


 Potassium


 Phosphate 20 mmol/


 Sodium Chloride  106.6667


 ml @ 


 51.667 m...  1X  ONCE  3/25/20 13:00


 3/25/20 15:03 DC 3/25/20 12:51


51.667 MLS/HR


 


 Potassium Acetate


 30 meq/Magnesium


 Sulfate 20 meq/


 Calcium Gluconate


 10 meq/


 Multivitamins 10


 ml/Chromium/


 Copper/Manganese/


 Seleni/Zn 0.5 ml/


 Insulin Human


 Regular 30 unit/


 Potassium


 Chloride 30 meq/


 Total Parenteral


 Nutrition/Amino


 Acids/Dextrose/


 Fat Emulsion


 Intravenous  1,920 ml @ 


 80 mls/hr  TPN  CONT  5/1/20 22:00


 5/2/20 21:59 DC 5/1/20 22:34


80 MLS/HR


 


 Potassium Acetate


 40 meq/Magnesium


 Sulfate 5 meq/


 Multivitamins 10


 ml/Chromium/


 Copper/Manganese/


 Seleni/Zn 1 ml/


 Insulin Human


 Regular 30 unit/


 Total Parenteral


 Nutrition/Amino


 Acids/Dextrose/


 Fat Emulsion


 Intravenous  1,920 ml @ 


 80 mls/hr  TPN  CONT  6/11/20 22:00


 6/12/20 21:59  6/11/20 23:23


80 MLS/HR


 


 Potassium Acetate


 55 meq/Magnesium


 Sulfate 20 meq/


 Calcium Gluconate


 10 meq/


 Multivitamins 10


 ml/Chromium/


 Copper/Manganese/


 Seleni/Zn 0.5 ml/


 Insulin Human


 Regular 30 unit/


 Total Parenteral


 Nutrition/Amino


 Acids/Dextrose/


 Fat Emulsion


 Intravenous  1,920 ml @ 


 80 mls/hr  TPN  CONT  4/30/20 22:00


 5/1/20 21:59 DC 5/1/20 01:00


80 MLS/HR


 


 Potassium Acetate


 55 meq/Magnesium


 Sulfate 20 meq/


 Calcium Gluconate


 10 meq/


 Multivitamins 10


 ml/Chromium/


 Copper/Manganese/


 Seleni/Zn 0.5 ml/


 Insulin Human


 Regular 35 unit/


 Total Parenteral


 Nutrition/Amino


 Acids/Dextrose/


 Fat Emulsion


 Intravenous  1,920 ml @ 


 80 mls/hr  TPN  CONT  4/28/20 22:00


 4/29/20 21:59 DC 4/28/20 22:02


80 MLS/HR


 


 Potassium Acetate


 60 meq/Magnesium


 Sulfate 5 meq/


 Multivitamins 10


 ml/Chromium/


 Copper/Manganese/


 Seleni/Zn 1 ml/


 Insulin Human


 Regular 30 unit/


 Total Parenteral


 Nutrition/Amino


 Acids/Dextrose/


 Fat Emulsion


 Intravenous  1,920 ml @ 


 80 mls/hr  TPN  CONT  6/6/20 22:00


 6/7/20 21:59 DC 6/6/20 21:54


80 MLS/HR


 


 Potassium Acetate


 65 meq/Magnesium


 Sulfate 20 meq/


 Calcium Gluconate


 10 meq/


 Multivitamins 10


 ml/Chromium/


 Copper/Manganese/


 Seleni/Zn 0.5 ml/


 Insulin Human


 Regular 30 unit/


 Total Parenteral


 Nutrition/Amino


 Acids/Dextrose/


 Fat Emulsion


 Intravenous  1,920 ml @ 


 80 mls/hr  TPN  CONT  4/29/20 22:00


 4/30/20 21:59 DC 4/29/20 22:22


80 MLS/HR


 


 Potassium Acetate


 80 meq/Magnesium


 Sulfate 5 meq/


 Multivitamins 10


 ml/Chromium/


 Copper/Manganese/


 Seleni/Zn 1 ml/


 Insulin Human


 Regular 20 unit/


 Total Parenteral


 Nutrition/Amino


 Acids/Dextrose/


 Fat Emulsion


 Intravenous  1,920 ml @ 


 80 mls/hr  TPN  CONT  6/5/20 22:00


 6/6/20 21:59 DC 6/5/20 21:59


80 MLS/HR


 


 Prochlorperazine


 Edisylate


  (Compazine)  5 mg  PACU PRN  PRN  4/27/20 07:00


 4/28/20 06:59 DC  





 


 Propofol  20 ml @ As


 Directed  STK-MED ONCE  4/27/20 12:26


 4/27/20 12:27 DC  





 


 Ringer's Solution  1,000 ml @ 


 30 mls/hr  Q24H  4/27/20 07:00


 4/27/20 18:59 DC  





 


 Rocuronium Bromide


  (Zemuron)  50 mg  STK-MED ONCE  4/27/20 10:56


 4/27/20 10:57 DC  





 


 Saliva Substitute


  (Biotene


 Moisturizing


 Mouth)  2 spray  PRN Q15MIN  PRN  5/21/20 11:00


     





 


 Sevoflurane


  (Ultane)  60 ml  STK-MED ONCE  4/27/20 12:26


 4/27/20 12:27 DC  





 


 Sodium


 Bicarbonate 50


 meq/Sodium


 Chloride  1,050 ml @ 


 75 mls/hr  Q14H  3/18/20 07:30


 3/23/20 10:28 DC 3/22/20 21:10


75 MLS/HR


 


 Sodium Acetate 50


 meq/Potassium


 Acetate 55 meq/


 Magnesium Sulfate


 20 meq/Calcium


 Gluconate 10 meq/


 Multivitamins 10


 ml/Chromium/


 Copper/Manganese/


 Seleni/Zn 0.5 ml/


 Insulin Human


 Regular 35 unit/


 Total Parenteral


 Nutrition/Amino


 Acids/Dextrose/


 Fat Emulsion


 Intravenous  1,800 ml @ 


 75 mls/hr  TPN  CONT  4/25/20 22:00


 4/26/20 21:59 DC 4/25/20 22:03


75 MLS/HR


 


 Sodium Bicarbonate


  (Sodium Bicarb


 Adult 8.4% Syr)  50 meq  1X  ONCE  6/7/20 22:00


 6/7/20 22:01 DC 6/7/20 21:47


50 MEQ


 


 Sodium Chloride  500 ml @ 


 500 mls/hr  1X  ONCE  6/9/20 06:45


 6/9/20 07:44 DC 6/9/20 06:39


500 MLS/HR


 


 Sodium Chloride


  (Normal Saline


 Flush)  3 ml  QSHIFT  PRN  4/27/20 13:45


     





 


 Sodium Chloride


 90 meq/Calcium


 Gluconate 10 meq/


 Multivitamins 10


 ml/Chromium/


 Copper/Manganese/


 Seleni/Zn 0.5 ml/


 Total Parenteral


 Nutrition/Amino


 Acids/Dextrose/


 Fat Emulsion


 Intravenous  1,512 ml @ 


 63 mls/hr  TPN  CONT  3/18/20 22:00


 3/19/20 21:59 DC 3/18/20 22:06


63 MLS/HR


 


 Sodium Chloride


 90 meq/Calcium


 Gluconate 10 meq/


 Multivitamins 10


 ml/Chromium/


 Copper/Manganese/


 Seleni/Zn 1 ml/


 Total Parenteral


 Nutrition/Amino


 Acids/Dextrose/


 Fat Emulsion


 Intravenous  55.005 ml


  @ 2.292


 mls/hr  TPN  CONT  3/18/20 22:00


 3/18/20 12:33 DC  





 


 Sodium Chloride


 90 meq/Magnesium


 Sulfate 10 meq/


 Calcium Gluconate


 20 meq/


 Multivitamins 10


 ml/Chromium/


 Copper/Manganese/


 Seleni/Zn 0.5 ml/


 Total Parenteral


 Nutrition/Amino


 Acids/Dextrose/


 Fat Emulsion


 Intravenous  1,512 ml @ 


 63 mls/hr  TPN  CONT  3/19/20 22:00


 3/20/20 21:59 DC 3/19/20 22:25


63 MLS/HR


 


 Sodium Chloride


 90 meq/Magnesium


 Sulfate 12 meq/


 Calcium Gluconate


 15 meq/


 Multivitamins 10


 ml/Chromium/


 Copper/Manganese/


 Seleni/Zn 0.5 ml/


 Insulin Human


 Regular 25 unit/


 Total Parenteral


 Nutrition/Amino


 Acids/Dextrose/


 Fat Emulsion


 Intravenous  1,400 ml @ 


 58.333 mls/


 hr  TPN  CONT  4/8/20 22:00


 4/9/20 21:59 DC 4/8/20 21:41


58.333 MLS/HR


 


 Sodium Chloride


 90 meq/Potassium


 Chloride 15 meq/


 Magnesium Sulfate


 12 meq/Calcium


 Gluconate 15 meq/


 Multivitamins 10


 ml/Chromium/


 Copper/Manganese/


 Seleni/Zn 0.5 ml/


 Insulin Human


 Regular 25 unit/


 Total Parenteral


 Nutrition/Amino


 Acids/Dextrose/


 Fat Emulsion


 Intravenous  1,400 ml @ 


 58.333 mls/


 hr  TPN  CONT  4/7/20 22:00


 4/8/20 21:59 DC 4/7/20 22:13


58.333 MLS/HR


 


 Sodium Chloride


 90 meq/Potassium


 Chloride 15 meq/


 Potassium


 Phosphate 10 mmol/


 Magnesium Sulfate


 8 meq/Calcium


 Gluconate 15 meq/


 Multivitamins 10


 ml/Chromium/


 Copper/Manganese/


 Seleni/Zn 0.5 ml/


 Insulin Human


 Regular 25 unit/


 Total Parenteral


 Nutrition/Amino


 Acids/Dextrose/


 Fat Emulsion


 Intravenous  1,400 ml @ 


 58.333 mls/


 hr  TPN  CONT  4/5/20 22:00


 4/6/20 21:59 DC 4/5/20 21:20


58.333 MLS/HR


 


 Sodium Chloride


 90 meq/Potassium


 Chloride 15 meq/


 Potassium


 Phosphate 10 mmol/


 Magnesium Sulfate


 10 meq/Calcium


 Gluconate 20 meq/


 Multivitamins 10


 ml/Chromium/


 Copper/Manganese/


 Seleni/Zn 0.5 ml/


 Total Parenteral


 Nutrition/Amino


 Acids/Dextrose/


 Fat Emulsion


 Intravenous  1,400 ml @ 


 58.333 mls/


 hr  TPN  CONT  3/23/20 22:00


 3/24/20 21:59 DC 3/23/20 21:42


58.333 MLS/HR


 


 Sodium Chloride


 90 meq/Potassium


 Chloride 15 meq/


 Potassium


 Phosphate 10 mmol/


 Magnesium Sulfate


 12 meq/Calcium


 Gluconate 15 meq/


 Multivitamins 10


 ml/Chromium/


 Copper/Manganese/


 Seleni/Zn 0.5 ml/


 Insulin Human


 Regular 25 unit/


 Total Parenteral


 Nutrition/Amino


 Acids/Dextrose/


 Fat Emulsion


 Intravenous  1,400 ml @ 


 58.333 mls/


 hr  TPN  CONT  4/6/20 22:00


 4/7/20 21:59 DC 4/6/20 22:24


58.333 MLS/HR


 


 Sodium Chloride


 90 meq/Potassium


 Chloride 15 meq/


 Potassium


 Phosphate 15 mmol/


 Magnesium Sulfate


 10 meq/Calcium


 Gluconate 15 meq/


 Multivitamins 10


 ml/Chromium/


 Copper/Manganese/


 Seleni/Zn 0.5 ml/


 Total Parenteral


 Nutrition/Amino


 Acids/Dextrose/


 Fat Emulsion


 Intravenous  1,400 ml @ 


 58.333 mls/


 hr  TPN  CONT  3/24/20 22:00


 3/25/20 21:59 DC 3/24/20 22:17


58.333 MLS/HR


 


 Sodium Chloride


 90 meq/Potassium


 Chloride 15 meq/


 Potassium


 Phosphate 15 mmol/


 Magnesium Sulfate


 10 meq/Calcium


 Gluconate 20 meq/


 Multivitamins 10


 ml/Chromium/


 Copper/Manganese/


 Seleni/Zn 0.5 ml/


 Total Parenteral


 Nutrition/Amino


 Acids/Dextrose/


 Fat Emulsion


 Intravenous  1,200 ml @ 


 50 mls/hr  TPN  CONT  3/22/20 22:00


 3/22/20 14:17 DC  





 


 Sodium Chloride


 90 meq/Potassium


 Chloride 15 meq/


 Potassium


 Phosphate 18 mmol/


 Magnesium Sulfate


 8 meq/Calcium


 Gluconate 15 meq/


 Multivitamins 10


 ml/Chromium/


 Copper/Manganese/


 Seleni/Zn 0.5 ml/


 Insulin Human


 Regular 10 unit/


 Total Parenteral


 Nutrition/Amino


 Acids/Dextrose/


 Fat Emulsion


 Intravenous  1,400 ml @ 


 58.333 mls/


 hr  TPN  CONT  3/27/20 22:00


 3/28/20 21:59 DC 3/27/20 21:43


58.333 MLS/HR


 


 Sodium Chloride


 90 meq/Potassium


 Chloride 15 meq/


 Potassium


 Phosphate 18 mmol/


 Magnesium Sulfate


 8 meq/Calcium


 Gluconate 15 meq/


 Multivitamins 10


 ml/Chromium/


 Copper/Manganese/


 Seleni/Zn 0.5 ml/


 Insulin Human


 Regular 15 unit/


 Total Parenteral


 Nutrition/Amino


 Acids/Dextrose/


 Fat Emulsion


 Intravenous  1,400 ml @ 


 58.333 mls/


 hr  TPN  CONT  3/30/20 22:00


 3/31/20 21:59 DC 3/30/20 21:47


58.333 MLS/HR


 


 Sodium Chloride


 90 meq/Potassium


 Chloride 15 meq/


 Potassium


 Phosphate 18 mmol/


 Magnesium Sulfate


 8 meq/Calcium


 Gluconate 15 meq/


 Multivitamins 10


 ml/Chromium/


 Copper/Manganese/


 Seleni/Zn 0.5 ml/


 Insulin Human


 Regular 20 unit/


 Total Parenteral


 Nutrition/Amino


 Acids/Dextrose/


 Fat Emulsion


 Intravenous  1,400 ml @ 


 58.333 mls/


 hr  TPN  CONT  4/2/20 22:00


 4/3/20 21:59 DC 4/2/20 22:45


58.333 MLS/HR


 


 Sodium Chloride


 90 meq/Potassium


 Chloride 15 meq/


 Potassium


 Phosphate 18 mmol/


 Magnesium Sulfate


 8 meq/Calcium


 Gluconate 15 meq/


 Multivitamins 10


 ml/Chromium/


 Copper/Manganese/


 Seleni/Zn 0.5 ml/


 Total Parenteral


 Nutrition/Amino


 Acids/Dextrose/


 Fat Emulsion


 Intravenous  1,400 ml @ 


 58.333 mls/


 hr  TPN  CONT  3/26/20 22:00


 3/27/20 21:59 DC 3/26/20 22:00


58.333 MLS/HR


 


 Sodium Chloride


 90 meq/Potassium


 Phosphate 15 mmol/


 Magnesium Sulfate


 12 meq/Calcium


 Gluconate 15 meq/


 Multivitamins 10


 ml/Chromium/


 Copper/Manganese/


 Seleni/Zn 0.5 ml/


 Insulin Human


 Regular 30 unit/


 Total Parenteral


 Nutrition/Amino


 Acids/Dextrose/


 Fat Emulsion


 Intravenous  1,400 ml @ 


 58.333 mls/


 hr  TPN  CONT  4/10/20 22:00


 4/11/20 21:59 DC 4/10/20 21:49


58.333 MLS/HR


 


 Sodium Chloride


 90 meq/Potassium


 Phosphate 15 mmol/


 Magnesium Sulfate


 12 meq/Calcium


 Gluconate 15 meq/


 Multivitamins 10


 ml/Chromium/


 Copper/Manganese/


 Seleni/Zn 0.5 ml/


 Insulin Human


 Regular 40 unit/


 Total Parenteral


 Nutrition/Amino


 Acids/Dextrose/


 Fat Emulsion


 Intravenous  1,400 ml @ 


 58.333 mls/


 hr  TPN  CONT  4/11/20 22:00


 4/12/20 21:59 DC 4/11/20 21:21


58.333 MLS/HR


 


 Sodium Chloride


 90 meq/Potassium


 Phosphate 19 mmol/


 Magnesium Sulfate


 12 meq/Calcium


 Gluconate 15 meq/


 Multivitamins 10


 ml/Chromium/


 Copper/Manganese/


 Seleni/Zn 0.5 ml/


 Insulin Human


 Regular 40 unit/


 Total Parenteral


 Nutrition/Amino


 Acids/Dextrose/


 Fat Emulsion


 Intravenous  1,400 ml @ 


 58.333 mls/


 hr  TPN  CONT  4/12/20 22:00


 4/13/20 21:59 DC 4/12/20 21:54


58.333 MLS/HR


 


 Sodium Chloride


 90 meq/Potassium


 Phosphate 5 mmol/


 Magnesium Sulfate


 12 meq/Calcium


 Gluconate 15 meq/


 Multivitamins 10


 ml/Chromium/


 Copper/Manganese/


 Seleni/Zn 0.5 ml/


 Insulin Human


 Regular 30 unit/


 Total Parenteral


 Nutrition/Amino


 Acids/Dextrose/


 Fat Emulsion


 Intravenous  1,400 ml @ 


 58.333 mls/


 hr  TPN  CONT  4/9/20 22:00


 4/10/20 21:59 DC 4/9/20 22:08


58.333 MLS/HR


 


 Sodium Chloride


 100 meq/Potassium


 Chloride 40 meq/


 Magnesium Sulfate


 15 meq/Calcium


 Gluconate 15 meq/


 Multivitamins 10


 ml/Chromium/


 Copper/Manganese/


 Seleni/Zn 0.5 ml/


 Insulin Human


 Regular 35 unit/


 Total Parenteral


 Nutrition/Amino


 Acids/Dextrose/


 Fat Emulsion


 Intravenous  1,400 ml @ 


 58.333 mls/


 hr  TPN  CONT  4/19/20 22:00


 4/20/20 21:59 DC 4/19/20 22:46


58.333 MLS/HR


 


 Sodium Chloride


 100 meq/Potassium


 Chloride 40 meq/


 Magnesium Sulfate


 20 meq/Calcium


 Gluconate 10 meq/


 Multivitamins 10


 ml/Chromium/


 Copper/Manganese/


 Seleni/Zn 0.5 ml/


 Insulin Human


 Regular 35 unit/


 Total Parenteral


 Nutrition/Amino


 Acids/Dextrose/


 Fat Emulsion


 Intravenous  1,400 ml @ 


 58.333 mls/


 hr  TPN  CONT  4/23/20 22:00


 4/24/20 21:59 DC 4/24/20 00:06


58.333 MLS/HR


 


 Sodium Chloride


 100 meq/Potassium


 Chloride 40 meq/


 Magnesium Sulfate


 20 meq/Calcium


 Gluconate 15 meq/


 Multivitamins 10


 ml/Chromium/


 Copper/Manganese/


 Seleni/Zn 0.5 ml/


 Insulin Human


 Regular 35 unit/


 Total Parenteral


 Nutrition/Amino


 Acids/Dextrose/


 Fat Emulsion


 Intravenous  1,400 ml @ 


 58.333 mls/


 hr  TPN  CONT  4/22/20 22:00


 4/23/20 21:59 DC 4/22/20 22:27


58.333 MLS/HR


 


 Sodium Chloride


 100 meq/Potassium


 Phosphate 10 mmol/


 Magnesium Sulfate


 12 meq/Calcium


 Gluconate 15 meq/


 Multivitamins 10


 ml/Chromium/


 Copper/Manganese/


 Seleni/Zn 0.5 ml/


 Insulin Human


 Regular 35 unit/


 Potassium


 Chloride 20 meq/


 Total Parenteral


 Nutrition/Amino


 Acids/Dextrose/


 Fat Emulsion


 Intravenous  1,400 ml @ 


 58.333 mls/


 hr  TPN  CONT  4/16/20 22:00


 4/17/20 21:59 DC 4/16/20 22:10


58.333 MLS/HR


 


 Sodium Chloride


 100 meq/Potassium


 Phosphate 19 mmol/


 Magnesium Sulfate


 12 meq/Calcium


 Gluconate 15 meq/


 Multivitamins 10


 ml/Chromium/


 Copper/Manganese/


 Seleni/Zn 0.5 ml/


 Insulin Human


 Regular 40 unit/


 Potassium


 Chloride 20 meq/


 Total Parenteral


 Nutrition/Amino


 Acids/Dextrose/


 Fat Emulsion


 Intravenous  1,400 ml @ 


 58.333 mls/


 hr  TPN  CONT  4/15/20 22:00


 4/16/20 21:59 DC 4/15/20 21:20


58.333 MLS/HR


 


 Sodium Chloride


 100 meq/Potassium


 Phosphate 5 mmol/


 Magnesium Sulfate


 12 meq/Calcium


 Gluconate 15 meq/


 Multivitamins 10


 ml/Chromium/


 Copper/Manganese/


 Seleni/Zn 0.5 ml/


 Insulin Human


 Regular 35 unit/


 Potassium


 Chloride 20 meq/


 Total Parenteral


 Nutrition/Amino


 Acids/Dextrose/


 Fat Emulsion


 Intravenous  1,400 ml @ 


 58.333 mls/


 hr  TPN  CONT  4/17/20 22:00


 4/18/20 21:59 DC 4/17/20 22:59


58.333 MLS/HR


 


 Succinylcholine


 Chloride


  (Anectine)  120 mg  1X  ONCE  3/23/20 08:30


 3/23/20 08:31 DC 3/23/20 08:34


120 MG


 


 Vecuronium Bromide


  (Norcuron Bolus)  6 mg  PRN Q6HRS  PRN  5/7/20 19:15


 5/7/20 19:35 DC  














Labs:


Lab





Laboratory Tests








Test


 6/11/20


11:32 6/12/20


06:05 6/12/20


06:18


 


Glucose (Fingerstick)


 163 mg/dL


(70-99) 


 165 mg/dL


(70-99)


 


White Blood Count


 


 8.7 x10^3/uL


(4.0-11.0) 





 


Red Blood Count


 


 2.56 x10^6/uL


(3.50-5.40) 





 


Hemoglobin


 


 7.6 g/dL


(12.0-15.5) 





 


Hematocrit


 


 22.9 %


(36.0-47.0) 





 


Mean Corpuscular Volume  90 fL ()  


 


Mean Corpuscular Hemoglobin  30 pg (25-35)  


 


Mean Corpuscular Hemoglobin


Concent 


 33 g/dL


(31-37) 





 


Red Cell Distribution Width


 


 16.6 %


(11.5-14.5) 





 


Platelet Count


 


 349 x10^3/uL


(140-400) 





 


Neutrophils (%) (Auto)  74 % (31-73)  


 


Lymphocytes (%) (Auto)  18 % (24-48)  


 


Monocytes (%) (Auto)  6 % (0-9)  


 


Eosinophils (%) (Auto)  1 % (0-3)  


 


Basophils (%) (Auto)  0 % (0-3)  


 


Neutrophils # (Auto)


 


 6.5 x10^3/uL


(1.8-7.7) 





 


Lymphocytes # (Auto)


 


 1.6 x10^3/uL


(1.0-4.8) 





 


Monocytes # (Auto)


 


 0.6 x10^3/uL


(0.0-1.1) 





 


Eosinophils # (Auto)


 


 0.1 x10^3/uL


(0.0-0.7) 





 


Basophils # (Auto)


 


 0.0 x10^3/uL


(0.0-0.2) 





 


Sodium Level


 


 146 mmol/L


(136-145) 





 


Potassium Level


 


 3.9 mmol/L


(3.5-5.1) 





 


Chloride Level


 


 114 mmol/L


() 





 


Carbon Dioxide Level


 


 25 mmol/L


(21-32) 





 


Anion Gap  7 (6-14)  


 


Blood Urea Nitrogen


 


 29 mg/dL


(7-20) 





 


Creatinine


 


 0.8 mg/dL


(0.6-1.0) 





 


Estimated GFR


(Cockcroft-Gault) 


 76.2 


 





 


BUN/Creatinine Ratio  36 (6-20)  


 


Glucose Level


 


 176 mg/dL


(70-99) 





 


Calcium Level


 


 9.8 mg/dL


(8.5-10.1) 





 


Total Bilirubin


 


 0.4 mg/dL


(0.2-1.0) 





 


Aspartate Amino Transf


(AST/SGOT) 


 22 U/L (15-37) 


 





 


Alanine Aminotransferase


(ALT/SGPT) 


 16 U/L (14-59) 


 





 


Alkaline Phosphatase


 


 83 U/L


() 





 


Total Protein


 


 5.1 g/dL


(6.4-8.2) 





 


Albumin


 


 1.1 g/dL


(3.4-5.0) 





 


Albumin/Globulin Ratio  0.3 (1.0-1.7)  








Micro


        NEG ANSON 56


        PSEUDOMONAS AERUGINOSA


        ANTIBIOTIC                        RESULT          INTERPRETATION


        AMIKACIN                          <=16                  S


        AZTREONAM                         >16                   R


        CEFTAZIDIME                       >16                   R


        CIPROFLOXACIN                     <=0.25                S


        CEFEPIME                          16                    I


        GENTAMICIN                        <=2                   S


        LEVOFLOXACIN                      <=0.5                 S














                               ** CONTINUED ON NEXT PAGE **





---------------------------------------------------

-----------------------------------------





RUN DATE: 06/10/20                  [facility] *LIVE*               

  PAGE 2   


RUN TIME: 1121                            Specimen Inquiry                    


-----------------------------------------

---------------------------------------------------





SPEC: [id number]    PATIENT: JESENIA BEAN                [id number]  

(Continued)


 

--------------------------------------------------------------------------------


------------








-------------------

-------------------------------------------------------------------------





  Procedure                         Result                                      

         


 

--------------------------------------------------------------------------------


------------





  ANTIMICROBIAL SUSCEPTIBILITY  Preliminary   (continued)


        MEROPENEM                         <=1                   S


        PIPERACILLIN/TAZOBACTAM           64                    S


        TOBRAMYCIN                        <=2                   S


        Unless otherwise specified, Testing Performed by:


        [address] [id number]


        For Inquires, the Physician may contact the Microbiology


        department at [phone]





 

--------------------------------------------------------------------------------


------------





Objective:


Assessment:


Patient with prolonged hospitalization


Multiple medical problems


Multiple surgical procedures








Fever improving


Acute pancreatitis with persistent  necrosis


CT a/p 4/9


    Increased ascites. Persistent evidence of necrotizing pancreatitis with 

fluid and phlegmon


   at the pancreas


   4/27 status post ROBERT drain placement; yeast


   5/6 fluid  candida parapsilosis fluid amylase high


CT 6/7


IMPRESSION:


1.   Sequela of pancreatitis with extensive pseudocysts again 


demonstrated, the right-sided collections are slightly larger since the 


prior exam, the left-sided collections are stable. 


6/7 fluid cult PSAE (MDRO),yeast moderate





Cholelithiasis with thickening of the gallbladder wall.


Leucocytosis 


JED,Hyperkalemia, Metabolic acidosis off dialysis


Acute hypoxic resp failure ,bilateral pleural effusion and atelectasis


hypocalcemia 


Prediabetes


HTN


s/p trach





Plan:


Plan of Care











Continue meropenem, has MDRP PSAE June 7


cont  micafungin June 7


Follow-up cultures fluid  for yeast


Monitor a.m. labs


General surgery following


Monitor drain output


Maintain aspiration precaution


Supportive care


Prognosis poor


Critically ill


Maintain contact isolation for MDRO Pseudomonas


D/w nursing











IVAN FRANZ MD           Jun 12, 2020 07:57

## 2020-06-13 VITALS — DIASTOLIC BLOOD PRESSURE: 62 MMHG | SYSTOLIC BLOOD PRESSURE: 116 MMHG

## 2020-06-13 VITALS — DIASTOLIC BLOOD PRESSURE: 62 MMHG | SYSTOLIC BLOOD PRESSURE: 110 MMHG

## 2020-06-13 VITALS — DIASTOLIC BLOOD PRESSURE: 68 MMHG | SYSTOLIC BLOOD PRESSURE: 120 MMHG

## 2020-06-13 VITALS — DIASTOLIC BLOOD PRESSURE: 61 MMHG | SYSTOLIC BLOOD PRESSURE: 110 MMHG

## 2020-06-13 VITALS — SYSTOLIC BLOOD PRESSURE: 165 MMHG | DIASTOLIC BLOOD PRESSURE: 76 MMHG

## 2020-06-13 VITALS — SYSTOLIC BLOOD PRESSURE: 180 MMHG | DIASTOLIC BLOOD PRESSURE: 91 MMHG

## 2020-06-13 VITALS — DIASTOLIC BLOOD PRESSURE: 64 MMHG | SYSTOLIC BLOOD PRESSURE: 116 MMHG

## 2020-06-13 VITALS — DIASTOLIC BLOOD PRESSURE: 66 MMHG | SYSTOLIC BLOOD PRESSURE: 114 MMHG

## 2020-06-13 VITALS — DIASTOLIC BLOOD PRESSURE: 74 MMHG | SYSTOLIC BLOOD PRESSURE: 177 MMHG

## 2020-06-13 VITALS — DIASTOLIC BLOOD PRESSURE: 53 MMHG | SYSTOLIC BLOOD PRESSURE: 92 MMHG

## 2020-06-13 VITALS — SYSTOLIC BLOOD PRESSURE: 143 MMHG | DIASTOLIC BLOOD PRESSURE: 75 MMHG

## 2020-06-13 VITALS — DIASTOLIC BLOOD PRESSURE: 62 MMHG | SYSTOLIC BLOOD PRESSURE: 112 MMHG

## 2020-06-13 VITALS — SYSTOLIC BLOOD PRESSURE: 90 MMHG | DIASTOLIC BLOOD PRESSURE: 52 MMHG

## 2020-06-13 VITALS — DIASTOLIC BLOOD PRESSURE: 60 MMHG | SYSTOLIC BLOOD PRESSURE: 108 MMHG

## 2020-06-13 VITALS — SYSTOLIC BLOOD PRESSURE: 108 MMHG | DIASTOLIC BLOOD PRESSURE: 62 MMHG

## 2020-06-13 VITALS — DIASTOLIC BLOOD PRESSURE: 56 MMHG | SYSTOLIC BLOOD PRESSURE: 96 MMHG

## 2020-06-13 VITALS — SYSTOLIC BLOOD PRESSURE: 120 MMHG | DIASTOLIC BLOOD PRESSURE: 67 MMHG

## 2020-06-13 VITALS — DIASTOLIC BLOOD PRESSURE: 83 MMHG | SYSTOLIC BLOOD PRESSURE: 171 MMHG

## 2020-06-13 VITALS — SYSTOLIC BLOOD PRESSURE: 112 MMHG | DIASTOLIC BLOOD PRESSURE: 62 MMHG

## 2020-06-13 VITALS — DIASTOLIC BLOOD PRESSURE: 77 MMHG | SYSTOLIC BLOOD PRESSURE: 146 MMHG

## 2020-06-13 VITALS — SYSTOLIC BLOOD PRESSURE: 110 MMHG | DIASTOLIC BLOOD PRESSURE: 58 MMHG

## 2020-06-13 VITALS — DIASTOLIC BLOOD PRESSURE: 62 MMHG | SYSTOLIC BLOOD PRESSURE: 106 MMHG

## 2020-06-13 VITALS — SYSTOLIC BLOOD PRESSURE: 134 MMHG | DIASTOLIC BLOOD PRESSURE: 75 MMHG

## 2020-06-13 VITALS — SYSTOLIC BLOOD PRESSURE: 163 MMHG | DIASTOLIC BLOOD PRESSURE: 68 MMHG

## 2020-06-13 VITALS — DIASTOLIC BLOOD PRESSURE: 58 MMHG | SYSTOLIC BLOOD PRESSURE: 96 MMHG

## 2020-06-13 VITALS — DIASTOLIC BLOOD PRESSURE: 69 MMHG | SYSTOLIC BLOOD PRESSURE: 131 MMHG

## 2020-06-13 VITALS — SYSTOLIC BLOOD PRESSURE: 116 MMHG | DIASTOLIC BLOOD PRESSURE: 93 MMHG

## 2020-06-13 VITALS — SYSTOLIC BLOOD PRESSURE: 119 MMHG | DIASTOLIC BLOOD PRESSURE: 65 MMHG

## 2020-06-13 LAB
ANION GAP SERPL CALC-SCNC: 10 MMOL/L (ref 6–14)
BASE EXCESS ABG: -2 MMOL/L (ref -3–3)
BASE EXCESS ABG: -3 MMOL/L (ref -3–3)
BASOPHILS # BLD AUTO: 0 X10^3/UL (ref 0–0.2)
BASOPHILS NFR BLD: 0 % (ref 0–3)
BUN SERPL-MCNC: 25 MG/DL (ref 7–20)
CALCIUM SERPL-MCNC: 10.1 MG/DL (ref 8.5–10.1)
CHLORIDE SERPL-SCNC: 112 MMOL/L (ref 98–107)
CO2 SERPL-SCNC: 24 MMOL/L (ref 21–32)
CREAT SERPL-MCNC: 0.7 MG/DL (ref 0.6–1)
EOSINOPHIL NFR BLD: 0.2 X10^3/UL (ref 0–0.7)
EOSINOPHIL NFR BLD: 2 % (ref 0–3)
ERYTHROCYTE [DISTWIDTH] IN BLOOD BY AUTOMATED COUNT: 18.7 % (ref 11.5–14.5)
ERYTHROCYTE [DISTWIDTH] IN BLOOD BY AUTOMATED COUNT: 19.1 % (ref 11.5–14.5)
ERYTHROCYTE [DISTWIDTH] IN BLOOD BY AUTOMATED COUNT: 20.1 % (ref 11.5–14.5)
GFR SERPLBLD BASED ON 1.73 SQ M-ARVRAT: 88.9 ML/MIN
GLUCOSE SERPL-MCNC: 151 MG/DL (ref 70–99)
HCO3 BLDA-SCNC: 23 MMOL/L (ref 21–28)
HCO3 BLDA-SCNC: 24 MMOL/L (ref 21–28)
HCT VFR BLD CALC: 23.5 % (ref 36–47)
HCT VFR BLD CALC: 26 % (ref 36–47)
HCT VFR BLD CALC: 27.7 % (ref 36–47)
HGB BLD-MCNC: 8.1 G/DL (ref 12–15.5)
HGB BLD-MCNC: 9 G/DL (ref 12–15.5)
HGB BLD-MCNC: 9.2 G/DL (ref 12–15.5)
INSPIRATION SETTING TIME VENT: 75
LYMPHOCYTES # BLD: 1.6 X10^3/UL (ref 1–4.8)
LYMPHOCYTES NFR BLD AUTO: 17 % (ref 24–48)
MCH RBC QN AUTO: 28 PG (ref 25–35)
MCH RBC QN AUTO: 29 PG (ref 25–35)
MCH RBC QN AUTO: 30 PG (ref 25–35)
MCHC RBC AUTO-ENTMCNC: 33 G/DL (ref 31–37)
MCHC RBC AUTO-ENTMCNC: 34 G/DL (ref 31–37)
MCHC RBC AUTO-ENTMCNC: 35 G/DL (ref 31–37)
MCV RBC AUTO: 85 FL (ref 79–100)
MCV RBC AUTO: 85 FL (ref 79–100)
MCV RBC AUTO: 86 FL (ref 79–100)
MONO #: 0.5 X10^3/UL (ref 0–1.1)
MONOCYTES NFR BLD: 5 % (ref 0–9)
NEUT #: 7.2 X10^3/UL (ref 1.8–7.7)
NEUTROPHILS NFR BLD AUTO: 76 % (ref 31–73)
PCO2 BLDA: 38 MMHG (ref 35–46)
PCO2 BLDA: 50 MMHG (ref 35–46)
PLATELET # BLD AUTO: 380 X10^3/UL (ref 140–400)
PLATELET # BLD AUTO: 388 X10^3/UL (ref 140–400)
PLATELET # BLD AUTO: 433 X10^3/UL (ref 140–400)
PO2 BLDA: 177 MMHG (ref 75–108)
PO2 BLDA: 64 MMHG (ref 75–108)
POTASSIUM SERPL-SCNC: 3.8 MMOL/L (ref 3.5–5.1)
RBC # BLD AUTO: 2.78 X10^6/UL (ref 3.5–5.4)
RBC # BLD AUTO: 3.04 X10^6/UL (ref 3.5–5.4)
RBC # BLD AUTO: 3.27 X10^6/UL (ref 3.5–5.4)
SAO2 % BLDA: 89 % (ref 92–99)
SAO2 % BLDA: 99 % (ref 92–99)
SODIUM SERPL-SCNC: 146 MMOL/L (ref 136–145)
WBC # BLD AUTO: 8.4 X10^3/UL (ref 4–11)
WBC # BLD AUTO: 9.3 X10^3/UL (ref 4–11)
WBC # BLD AUTO: 9.4 X10^3/UL (ref 4–11)

## 2020-06-13 RX ADMIN — Medication PRN EACH: at 09:33

## 2020-06-13 RX ADMIN — BACITRACIN SCH MLS/HR: 5000 INJECTION, POWDER, FOR SOLUTION INTRAMUSCULAR at 16:00

## 2020-06-13 RX ADMIN — IPRATROPIUM BROMIDE AND ALBUTEROL SULFATE SCH ML: .5; 3 SOLUTION RESPIRATORY (INHALATION) at 11:35

## 2020-06-13 RX ADMIN — DILTIAZEM HYDROCHLORIDE SCH MLS/HR: 5 INJECTION INTRAVENOUS at 11:16

## 2020-06-13 RX ADMIN — DILTIAZEM HYDROCHLORIDE SCH MLS/HR: 5 INJECTION INTRAVENOUS at 08:17

## 2020-06-13 RX ADMIN — INSULIN LISPRO SCH UNITS: 100 INJECTION, SOLUTION INTRAVENOUS; SUBCUTANEOUS at 11:28

## 2020-06-13 RX ADMIN — IPRATROPIUM BROMIDE AND ALBUTEROL SULFATE SCH ML: .5; 3 SOLUTION RESPIRATORY (INHALATION) at 08:06

## 2020-06-13 RX ADMIN — DILTIAZEM HYDROCHLORIDE SCH MLS/HR: 5 INJECTION INTRAVENOUS at 21:17

## 2020-06-13 RX ADMIN — METOPROLOL TARTRATE PRN MG: 5 INJECTION INTRAVENOUS at 00:27

## 2020-06-13 RX ADMIN — IPRATROPIUM BROMIDE AND ALBUTEROL SULFATE SCH ML: .5; 3 SOLUTION RESPIRATORY (INHALATION) at 15:44

## 2020-06-13 RX ADMIN — Medication PRN EACH: at 09:31

## 2020-06-13 RX ADMIN — INSULIN LISPRO SCH UNITS: 100 INJECTION, SOLUTION INTRAVENOUS; SUBCUTANEOUS at 05:50

## 2020-06-13 RX ADMIN — BACITRACIN SCH MLS/HR: 5000 INJECTION, POWDER, FOR SOLUTION INTRAMUSCULAR at 00:31

## 2020-06-13 RX ADMIN — METOPROLOL TARTRATE PRN MG: 5 INJECTION INTRAVENOUS at 04:03

## 2020-06-13 RX ADMIN — INSULIN LISPRO SCH UNITS: 100 INJECTION, SOLUTION INTRAVENOUS; SUBCUTANEOUS at 18:23

## 2020-06-13 RX ADMIN — ACETYLCYSTEINE SCH MG: 200 INHALANT RESPIRATORY (INHALATION) at 21:17

## 2020-06-13 RX ADMIN — INSULIN LISPRO SCH UNITS: 100 INJECTION, SOLUTION INTRAVENOUS; SUBCUTANEOUS at 00:00

## 2020-06-13 RX ADMIN — PROPOFOL PRN MLS/HR: 10 INJECTION, EMULSION INTRAVENOUS at 06:10

## 2020-06-13 RX ADMIN — PANTOPRAZOLE SODIUM SCH MG: 40 INJECTION, POWDER, FOR SOLUTION INTRAVENOUS at 08:16

## 2020-06-13 RX ADMIN — FENTANYL CITRATE PRN MCG: 50 INJECTION INTRAMUSCULAR; INTRAVENOUS at 05:15

## 2020-06-13 RX ADMIN — BACITRACIN SCH MLS/HR: 5000 INJECTION, POWDER, FOR SOLUTION INTRAMUSCULAR at 11:15

## 2020-06-13 RX ADMIN — IPRATROPIUM BROMIDE AND ALBUTEROL SULFATE SCH ML: .5; 3 SOLUTION RESPIRATORY (INHALATION) at 20:00

## 2020-06-13 RX ADMIN — FENTANYL CITRATE PRN MCG: 50 INJECTION INTRAMUSCULAR; INTRAVENOUS at 00:27

## 2020-06-13 NOTE — PDOC
SAURAV NASH APRN 6/13/20 0940:


SURGICAL PROGRESS NOTE


Subjective


family present 


desat this AM


Vital Signs





Vital Signs








  Date Time  Temp Pulse Resp B/P (MAP) Pulse Ox O2 Delivery O2 Flow Rate FiO2


 


6/13/20 09:01     100 Ventilator  


 


6/13/20 09:00  106 20 119/65 (83)    


 


6/13/20 08:00 99.3       





 99.3       


 


6/13/20 05:50       15.0 








I&O











Intake and Output 


 


 6/13/20





 07:00


 


Intake Total 5029 ml


 


Output Total 2460 ml


 


Balance 2569 ml


 


 


 


IV Total 4499 ml


 


Blood Product 280 ml


 


Blood Product IV Normal Saline Flush 250 ml


 


Output Urine Total 1570 ml


 


Drainage Total 890 ml








General:  Other (lethargic)


Abdomen:  Soft, Other (dains bloody)


Labs





Laboratory Tests








Test


 6/11/20


11:32 6/12/20


06:05 6/12/20


06:18 6/12/20


12:05


 


Glucose (Fingerstick)


 163 mg/dL


(70-99) 


 165 mg/dL


(70-99) 158 mg/dL


(70-99)


 


White Blood Count


 


 8.7 x10^3/uL


(4.0-11.0) 


 





 


Red Blood Count


 


 2.56 x10^6/uL


(3.50-5.40) 


 





 


Hemoglobin


 


 7.6 g/dL


(12.0-15.5) 


 





 


Hematocrit


 


 22.9 %


(36.0-47.0) 


 





 


Mean Corpuscular Volume  90 fL ()   


 


Mean Corpuscular Hemoglobin  30 pg (25-35)   


 


Mean Corpuscular Hemoglobin


Concent 


 33 g/dL


(31-37) 


 





 


Red Cell Distribution Width


 


 16.6 %


(11.5-14.5) 


 





 


Platelet Count


 


 349 x10^3/uL


(140-400) 


 





 


Neutrophils (%) (Auto)  74 % (31-73)   


 


Lymphocytes (%) (Auto)  18 % (24-48)   


 


Monocytes (%) (Auto)  6 % (0-9)   


 


Eosinophils (%) (Auto)  1 % (0-3)   


 


Basophils (%) (Auto)  0 % (0-3)   


 


Neutrophils # (Auto)


 


 6.5 x10^3/uL


(1.8-7.7) 


 





 


Lymphocytes # (Auto)


 


 1.6 x10^3/uL


(1.0-4.8) 


 





 


Monocytes # (Auto)


 


 0.6 x10^3/uL


(0.0-1.1) 


 





 


Eosinophils # (Auto)


 


 0.1 x10^3/uL


(0.0-0.7) 


 





 


Basophils # (Auto)


 


 0.0 x10^3/uL


(0.0-0.2) 


 





 


Sodium Level


 


 146 mmol/L


(136-145) 


 





 


Potassium Level


 


 3.9 mmol/L


(3.5-5.1) 


 





 


Chloride Level


 


 114 mmol/L


() 


 





 


Carbon Dioxide Level


 


 25 mmol/L


(21-32) 


 





 


Anion Gap  7 (6-14)   


 


Blood Urea Nitrogen


 


 29 mg/dL


(7-20) 


 





 


Creatinine


 


 0.8 mg/dL


(0.6-1.0) 


 





 


Estimated GFR


(Cockcroft-Gault) 


 76.2 


 


 





 


BUN/Creatinine Ratio  36 (6-20)   


 


Glucose Level


 


 176 mg/dL


(70-99) 


 





 


Calcium Level


 


 9.8 mg/dL


(8.5-10.1) 


 





 


Total Bilirubin


 


 0.4 mg/dL


(0.2-1.0) 


 





 


Aspartate Amino Transf


(AST/SGOT) 


 22 U/L (15-37) 


 


 





 


Alanine Aminotransferase


(ALT/SGPT) 


 16 U/L (14-59) 


 


 





 


Alkaline Phosphatase


 


 83 U/L


() 


 





 


Total Protein


 


 5.1 g/dL


(6.4-8.2) 


 





 


Albumin


 


 1.1 g/dL


(3.4-5.0) 


 





 


Albumin/Globulin Ratio  0.3 (1.0-1.7)   


 


Test


 6/12/20


12:45 6/12/20


17:11 6/12/20


23:56 6/13/20


04:00


 


White Blood Count


 7.0 x10^3/uL


(4.0-11.0) 


 


 9.4 x10^3/uL


(4.0-11.0)


 


Red Blood Count


 2.99 x10^6/uL


(3.50-5.40) 


 


 3.27 x10^6/uL


(3.50-5.40)


 


Hemoglobin


 8.5 g/dL


(12.0-15.5) 


 


 9.2 g/dL


(12.0-15.5)


 


Hematocrit


 25.7 %


(36.0-47.0) 


 


 27.7 %


(36.0-47.0)


 


Mean Corpuscular Volume 86 fL ()    85 fL () 


 


Mean Corpuscular Hemoglobin 29 pg (25-35)    28 pg (25-35) 


 


Mean Corpuscular Hemoglobin


Concent 33 g/dL


(31-37) 


 


 33 g/dL


(31-37)


 


Red Cell Distribution Width


 19.7 %


(11.5-14.5) 


 


 20.1 %


(11.5-14.5)


 


Platelet Count


 347 x10^3/uL


(140-400) 


 


 433 x10^3/uL


(140-400)


 


Glucose (Fingerstick)


 


 145 mg/dL


(70-99) 140 mg/dL


(70-99) 





 


Neutrophils (%) (Auto)    76 % (31-73) 


 


Lymphocytes (%) (Auto)    17 % (24-48) 


 


Monocytes (%) (Auto)    5 % (0-9) 


 


Eosinophils (%) (Auto)    2 % (0-3) 


 


Basophils (%) (Auto)    0 % (0-3) 


 


Neutrophils # (Auto)


 


 


 


 7.2 x10^3/uL


(1.8-7.7)


 


Lymphocytes # (Auto)


 


 


 


 1.6 x10^3/uL


(1.0-4.8)


 


Monocytes # (Auto)


 


 


 


 0.5 x10^3/uL


(0.0-1.1)


 


Eosinophils # (Auto)


 


 


 


 0.2 x10^3/uL


(0.0-0.7)


 


Basophils # (Auto)


 


 


 


 0.0 x10^3/uL


(0.0-0.2)


 


Sodium Level


 


 


 


 146 mmol/L


(136-145)


 


Potassium Level


 


 


 


 3.8 mmol/L


(3.5-5.1)


 


Chloride Level


 


 


 


 112 mmol/L


()


 


Carbon Dioxide Level


 


 


 


 24 mmol/L


(21-32)


 


Anion Gap    10 (6-14) 


 


Blood Urea Nitrogen


 


 


 


 25 mg/dL


(7-20)


 


Creatinine


 


 


 


 0.7 mg/dL


(0.6-1.0)


 


Estimated GFR


(Cockcroft-Gault) 


 


 


 88.9 





 


Glucose Level


 


 


 


 151 mg/dL


(70-99)


 


Calcium Level


 


 


 


 10.1 mg/dL


(8.5-10.1)


 


Test


 6/13/20


05:20 


 


 





 


O2 Saturation 89 % (92-99)    


 


Arterial Blood pH


 7.29


(7.35-7.45) 


 


 





 


Arterial Blood pCO2 at


Patient Temp 50 mmHg


(35-46) 


 


 





 


Arterial Blood pO2 at Patient


Temp 64 mmHg


() 


 


 





 


Arterial Blood HCO3


 24 mmol/L


(21-28) 


 


 





 


Arterial Blood Base Excess


 -3 mmol/L


(-3-3) 


 


 











Laboratory Tests








Test


 6/12/20


12:05 6/12/20


12:45 6/12/20


17:11 6/12/20


23:56


 


Glucose (Fingerstick)


 158 mg/dL


(70-99) 


 145 mg/dL


(70-99) 140 mg/dL


(70-99)


 


White Blood Count


 


 7.0 x10^3/uL


(4.0-11.0) 


 





 


Red Blood Count


 


 2.99 x10^6/uL


(3.50-5.40) 


 





 


Hemoglobin


 


 8.5 g/dL


(12.0-15.5) 


 





 


Hematocrit


 


 25.7 %


(36.0-47.0) 


 





 


Mean Corpuscular Volume  86 fL ()   


 


Mean Corpuscular Hemoglobin  29 pg (25-35)   


 


Mean Corpuscular Hemoglobin


Concent 


 33 g/dL


(31-37) 


 





 


Red Cell Distribution Width


 


 19.7 %


(11.5-14.5) 


 





 


Platelet Count


 


 347 x10^3/uL


(140-400) 


 





 


Test


 6/13/20


04:00 6/13/20


05:20 


 





 


White Blood Count


 9.4 x10^3/uL


(4.0-11.0) 


 


 





 


Red Blood Count


 3.27 x10^6/uL


(3.50-5.40) 


 


 





 


Hemoglobin


 9.2 g/dL


(12.0-15.5) 


 


 





 


Hematocrit


 27.7 %


(36.0-47.0) 


 


 





 


Mean Corpuscular Volume 85 fL ()    


 


Mean Corpuscular Hemoglobin 28 pg (25-35)    


 


Mean Corpuscular Hemoglobin


Concent 33 g/dL


(31-37) 


 


 





 


Red Cell Distribution Width


 20.1 %


(11.5-14.5) 


 


 





 


Platelet Count


 433 x10^3/uL


(140-400) 


 


 





 


Neutrophils (%) (Auto) 76 % (31-73)    


 


Lymphocytes (%) (Auto) 17 % (24-48)    


 


Monocytes (%) (Auto) 5 % (0-9)    


 


Eosinophils (%) (Auto) 2 % (0-3)    


 


Basophils (%) (Auto) 0 % (0-3)    


 


Neutrophils # (Auto)


 7.2 x10^3/uL


(1.8-7.7) 


 


 





 


Lymphocytes # (Auto)


 1.6 x10^3/uL


(1.0-4.8) 


 


 





 


Monocytes # (Auto)


 0.5 x10^3/uL


(0.0-1.1) 


 


 





 


Eosinophils # (Auto)


 0.2 x10^3/uL


(0.0-0.7) 


 


 





 


Basophils # (Auto)


 0.0 x10^3/uL


(0.0-0.2) 


 


 





 


Sodium Level


 146 mmol/L


(136-145) 


 


 





 


Potassium Level


 3.8 mmol/L


(3.5-5.1) 


 


 





 


Chloride Level


 112 mmol/L


() 


 


 





 


Carbon Dioxide Level


 24 mmol/L


(21-32) 


 


 





 


Anion Gap 10 (6-14)    


 


Blood Urea Nitrogen


 25 mg/dL


(7-20) 


 


 





 


Creatinine


 0.7 mg/dL


(0.6-1.0) 


 


 





 


Estimated GFR


(Cockcroft-Gault) 88.9 


 


 


 





 


Glucose Level


 151 mg/dL


(70-99) 


 


 





 


Calcium Level


 10.1 mg/dL


(8.5-10.1) 


 


 





 


O2 Saturation  89 % (92-99)   


 


Arterial Blood pH


 


 7.29


(7.35-7.45) 


 





 


Arterial Blood pCO2 at


Patient Temp 


 50 mmHg


(35-46) 


 





 


Arterial Blood pO2 at Patient


Temp 


 64 mmHg


() 


 





 


Arterial Blood HCO3


 


 24 mmol/L


(21-28) 


 





 


Arterial Blood Base Excess


 


 -3 mmol/L


(-3-3) 


 











Problem List


Problems


Medical Problems:


(1) Acute pancreatitis


Status: Acute  





(2) Cholelithiasis


Status: Acute  








Assessment/Plan


supportive care





Justicifation of Admission Dx:


Justifications for Admission:


Justification of Admission Dx:  Yes





OVIDIO MEDINA MD 6/13/20 0946:


SURGICAL PROGRESS NOTE


Assessment/Plan


Agree with Leeroy assessment and plan continue supportive care











SAURAV NASH APRN            Jun 13, 2020 09:40


OVIDIO MEDINA MD             Jun 13, 2020 09:46

## 2020-06-13 NOTE — RAD
INDICATION: Reason: Resp Failure, Increasing O2 demands

 

COMPARISON: June 8, 2020

 

FINDINGS:

 

Single view of chest obtained.

Portion of the lung apex is obscured from the field of view. There is 

complete white out of the left lung which is increased from prior. Low 

lung volume on the right with interstitial prominence. Tracheostomy tube 

is seen. Enteric tube is seen coursing below the diaphragm. Drain in the 

right upper quadrant of the abdomen. Left-sided PICC line with tip at SVC.

 

 

IMPRESSION:

 

*  Lines and tubes as above.

 

*  Complete opacification of the left chest which is increased from prior 

and could be from air space consolidation and pleural effusion.

 

*  Low lung volume on the right with elevation of the right hemidiaphragm 

apparent. This apparent elevation of the right hemidiaphragm could be 

secondary to consolidation or effusion at the right chest base.

 

Electronically signed by: Keegan Stovall MD (6/13/2020 6:39 AM) 

[id number]

## 2020-06-13 NOTE — PDOC
PULMONARY PROGRESS NOTES


Subjective


sat in 80 on tm 100% fio2, i put her back on vent at 6 am, now on peep 5, fi02 

75%, large thick secretion, 


Patient intubated on 3/23 , s/p trach 4/6, 


transferred back to ICU 6/5 for syncope with collapse


developed anemia, blood drainage from RLQ abdomen drain site, and surrounding 

firmness  / developed septic shock 6/7 from abdomen source, required levo 6/7


s/p 3 new drains 6/7 with brown color drainage


was place on AVAPS 6/7 post  


continues to have Dark blood / bloody drainage from ROBERT drains, 900 cc in past 24

hrs . off pressor


Vitals





Vital Signs








  Date Time  Temp Pulse Resp B/P (MAP) Pulse Ox O2 Delivery O2 Flow Rate FiO2


 


6/13/20 09:01     100 Ventilator  


 


6/13/20 09:00  106 20 119/65 (83)    


 


6/13/20 08:00 99.3       





 99.3       


 


6/13/20 05:50       15.0 








Comments


ros  unable to obtain


HEENT:  Other (nc at perrl   nose clear    neck  trach site ok   no lab  no 

thyromegaly)


Lungs:  Other (diminshed in bases, Rhonci in LLL)


Cardiovascular:  S1, S2


Abdomen:  Soft, Non-tender, Other (bleeding from Sx. site RLQ and firmness)


Extremities:  Other (+1 BLE edema)


Skin:  Warm, Dry


Labs





Laboratory Tests








Test


 6/11/20


11:32 6/12/20


06:05 6/12/20


06:18 6/12/20


12:05


 


Glucose (Fingerstick)


 163 mg/dL


(70-99) 


 165 mg/dL


(70-99) 158 mg/dL


(70-99)


 


White Blood Count


 


 8.7 x10^3/uL


(4.0-11.0) 


 





 


Red Blood Count


 


 2.56 x10^6/uL


(3.50-5.40) 


 





 


Hemoglobin


 


 7.6 g/dL


(12.0-15.5) 


 





 


Hematocrit


 


 22.9 %


(36.0-47.0) 


 





 


Mean Corpuscular Volume  90 fL ()   


 


Mean Corpuscular Hemoglobin  30 pg (25-35)   


 


Mean Corpuscular Hemoglobin


Concent 


 33 g/dL


(31-37) 


 





 


Red Cell Distribution Width


 


 16.6 %


(11.5-14.5) 


 





 


Platelet Count


 


 349 x10^3/uL


(140-400) 


 





 


Neutrophils (%) (Auto)  74 % (31-73)   


 


Lymphocytes (%) (Auto)  18 % (24-48)   


 


Monocytes (%) (Auto)  6 % (0-9)   


 


Eosinophils (%) (Auto)  1 % (0-3)   


 


Basophils (%) (Auto)  0 % (0-3)   


 


Neutrophils # (Auto)


 


 6.5 x10^3/uL


(1.8-7.7) 


 





 


Lymphocytes # (Auto)


 


 1.6 x10^3/uL


(1.0-4.8) 


 





 


Monocytes # (Auto)


 


 0.6 x10^3/uL


(0.0-1.1) 


 





 


Eosinophils # (Auto)


 


 0.1 x10^3/uL


(0.0-0.7) 


 





 


Basophils # (Auto)


 


 0.0 x10^3/uL


(0.0-0.2) 


 





 


Sodium Level


 


 146 mmol/L


(136-145) 


 





 


Potassium Level


 


 3.9 mmol/L


(3.5-5.1) 


 





 


Chloride Level


 


 114 mmol/L


() 


 





 


Carbon Dioxide Level


 


 25 mmol/L


(21-32) 


 





 


Anion Gap  7 (6-14)   


 


Blood Urea Nitrogen


 


 29 mg/dL


(7-20) 


 





 


Creatinine


 


 0.8 mg/dL


(0.6-1.0) 


 





 


Estimated GFR


(Cockcroft-Gault) 


 76.2 


 


 





 


BUN/Creatinine Ratio  36 (6-20)   


 


Glucose Level


 


 176 mg/dL


(70-99) 


 





 


Calcium Level


 


 9.8 mg/dL


(8.5-10.1) 


 





 


Total Bilirubin


 


 0.4 mg/dL


(0.2-1.0) 


 





 


Aspartate Amino Transf


(AST/SGOT) 


 22 U/L (15-37) 


 


 





 


Alanine Aminotransferase


(ALT/SGPT) 


 16 U/L (14-59) 


 


 





 


Alkaline Phosphatase


 


 83 U/L


() 


 





 


Total Protein


 


 5.1 g/dL


(6.4-8.2) 


 





 


Albumin


 


 1.1 g/dL


(3.4-5.0) 


 





 


Albumin/Globulin Ratio  0.3 (1.0-1.7)   


 


Test


 6/12/20


12:45 6/12/20


17:11 6/12/20


23:56 6/13/20


04:00


 


White Blood Count


 7.0 x10^3/uL


(4.0-11.0) 


 


 9.4 x10^3/uL


(4.0-11.0)


 


Red Blood Count


 2.99 x10^6/uL


(3.50-5.40) 


 


 3.27 x10^6/uL


(3.50-5.40)


 


Hemoglobin


 8.5 g/dL


(12.0-15.5) 


 


 9.2 g/dL


(12.0-15.5)


 


Hematocrit


 25.7 %


(36.0-47.0) 


 


 27.7 %


(36.0-47.0)


 


Mean Corpuscular Volume 86 fL ()    85 fL () 


 


Mean Corpuscular Hemoglobin 29 pg (25-35)    28 pg (25-35) 


 


Mean Corpuscular Hemoglobin


Concent 33 g/dL


(31-37) 


 


 33 g/dL


(31-37)


 


Red Cell Distribution Width


 19.7 %


(11.5-14.5) 


 


 20.1 %


(11.5-14.5)


 


Platelet Count


 347 x10^3/uL


(140-400) 


 


 433 x10^3/uL


(140-400)


 


Glucose (Fingerstick)


 


 145 mg/dL


(70-99) 140 mg/dL


(70-99) 





 


Neutrophils (%) (Auto)    76 % (31-73) 


 


Lymphocytes (%) (Auto)    17 % (24-48) 


 


Monocytes (%) (Auto)    5 % (0-9) 


 


Eosinophils (%) (Auto)    2 % (0-3) 


 


Basophils (%) (Auto)    0 % (0-3) 


 


Neutrophils # (Auto)


 


 


 


 7.2 x10^3/uL


(1.8-7.7)


 


Lymphocytes # (Auto)


 


 


 


 1.6 x10^3/uL


(1.0-4.8)


 


Monocytes # (Auto)


 


 


 


 0.5 x10^3/uL


(0.0-1.1)


 


Eosinophils # (Auto)


 


 


 


 0.2 x10^3/uL


(0.0-0.7)


 


Basophils # (Auto)


 


 


 


 0.0 x10^3/uL


(0.0-0.2)


 


Sodium Level


 


 


 


 146 mmol/L


(136-145)


 


Potassium Level


 


 


 


 3.8 mmol/L


(3.5-5.1)


 


Chloride Level


 


 


 


 112 mmol/L


()


 


Carbon Dioxide Level


 


 


 


 24 mmol/L


(21-32)


 


Anion Gap    10 (6-14) 


 


Blood Urea Nitrogen


 


 


 


 25 mg/dL


(7-20)


 


Creatinine


 


 


 


 0.7 mg/dL


(0.6-1.0)


 


Estimated GFR


(Cockcroft-Gault) 


 


 


 88.9 





 


Glucose Level


 


 


 


 151 mg/dL


(70-99)


 


Calcium Level


 


 


 


 10.1 mg/dL


(8.5-10.1)


 


Test


 6/13/20


05:20 


 


 





 


O2 Saturation 89 % (92-99)    


 


Arterial Blood pH


 7.29


(7.35-7.45) 


 


 





 


Arterial Blood pCO2 at


Patient Temp 50 mmHg


(35-46) 


 


 





 


Arterial Blood pO2 at Patient


Temp 64 mmHg


() 


 


 





 


Arterial Blood HCO3


 24 mmol/L


(21-28) 


 


 





 


Arterial Blood Base Excess


 -3 mmol/L


(-3-3) 


 


 











Laboratory Tests








Test


 6/12/20


12:05 6/12/20


12:45 6/12/20


17:11 6/12/20


23:56


 


Glucose (Fingerstick)


 158 mg/dL


(70-99) 


 145 mg/dL


(70-99) 140 mg/dL


(70-99)


 


White Blood Count


 


 7.0 x10^3/uL


(4.0-11.0) 


 





 


Red Blood Count


 


 2.99 x10^6/uL


(3.50-5.40) 


 





 


Hemoglobin


 


 8.5 g/dL


(12.0-15.5) 


 





 


Hematocrit


 


 25.7 %


(36.0-47.0) 


 





 


Mean Corpuscular Volume  86 fL ()   


 


Mean Corpuscular Hemoglobin  29 pg (25-35)   


 


Mean Corpuscular Hemoglobin


Concent 


 33 g/dL


(31-37) 


 





 


Red Cell Distribution Width


 


 19.7 %


(11.5-14.5) 


 





 


Platelet Count


 


 347 x10^3/uL


(140-400) 


 





 


Test


 6/13/20


04:00 6/13/20


05:20 


 





 


White Blood Count


 9.4 x10^3/uL


(4.0-11.0) 


 


 





 


Red Blood Count


 3.27 x10^6/uL


(3.50-5.40) 


 


 





 


Hemoglobin


 9.2 g/dL


(12.0-15.5) 


 


 





 


Hematocrit


 27.7 %


(36.0-47.0) 


 


 





 


Mean Corpuscular Volume 85 fL ()    


 


Mean Corpuscular Hemoglobin 28 pg (25-35)    


 


Mean Corpuscular Hemoglobin


Concent 33 g/dL


(31-37) 


 


 





 


Red Cell Distribution Width


 20.1 %


(11.5-14.5) 


 


 





 


Platelet Count


 433 x10^3/uL


(140-400) 


 


 





 


Neutrophils (%) (Auto) 76 % (31-73)    


 


Lymphocytes (%) (Auto) 17 % (24-48)    


 


Monocytes (%) (Auto) 5 % (0-9)    


 


Eosinophils (%) (Auto) 2 % (0-3)    


 


Basophils (%) (Auto) 0 % (0-3)    


 


Neutrophils # (Auto)


 7.2 x10^3/uL


(1.8-7.7) 


 


 





 


Lymphocytes # (Auto)


 1.6 x10^3/uL


(1.0-4.8) 


 


 





 


Monocytes # (Auto)


 0.5 x10^3/uL


(0.0-1.1) 


 


 





 


Eosinophils # (Auto)


 0.2 x10^3/uL


(0.0-0.7) 


 


 





 


Basophils # (Auto)


 0.0 x10^3/uL


(0.0-0.2) 


 


 





 


Sodium Level


 146 mmol/L


(136-145) 


 


 





 


Potassium Level


 3.8 mmol/L


(3.5-5.1) 


 


 





 


Chloride Level


 112 mmol/L


() 


 


 





 


Carbon Dioxide Level


 24 mmol/L


(21-32) 


 


 





 


Anion Gap 10 (6-14)    


 


Blood Urea Nitrogen


 25 mg/dL


(7-20) 


 


 





 


Creatinine


 0.7 mg/dL


(0.6-1.0) 


 


 





 


Estimated GFR


(Cockcroft-Gault) 88.9 


 


 


 





 


Glucose Level


 151 mg/dL


(70-99) 


 


 





 


Calcium Level


 10.1 mg/dL


(8.5-10.1) 


 


 





 


O2 Saturation  89 % (92-99)   


 


Arterial Blood pH


 


 7.29


(7.35-7.45) 


 





 


Arterial Blood pCO2 at


Patient Temp 


 50 mmHg


(35-46) 


 





 


Arterial Blood pO2 at Patient


Temp 


 64 mmHg


() 


 





 


Arterial Blood HCO3


 


 24 mmol/L


(21-28) 


 





 


Arterial Blood Base Excess


 


 -3 mmol/L


(-3-3) 


 











Medications





Active Scripts








 Medications  Dose


 Route/Sig


 Max Daily Dose Days Date Category


 


 Bisoprolol


 Fumarate 5 Mg


 Tablet  10 Mg


 PO DAILY


   3/16/20 Reported








Comments


CXR 6/13/2020


 


*  Complete opacification of the left chest which is increased from prior 


and could be from air space consolidation and pleural effusion.


 


*  Low lung volume on the right with elevation of the right hemidiaphragm 


apparent. This apparent elevation of the right hemidiaphragm could be 


secondary to consolidation or effusion at the right chest base.











ct abdomen /pelvis 6/6


1. Removal of the percutaneous pigtail drainage catheters since the prior 


exam. Sequela of pancreatitis with extensive pseudocysts again 


demonstrated, the right-sided collections are slightly larger since the 


prior exam, the left-sided collections are stable. See above.


2. Moderate to large left pleural effusion with atelectasis and collapse 


of most of the left lower lobe, stable. Small right pleural effusion is 


stable.


3. Gallstone.





Impression


.


IMPRESSION:


1.  Acute hypoxemic respiratory failure secondary to ARDS status post trach, 

developed anemia 6/7, blood drainage from RLQ abdomen drain site, and 

surrounding firmness  / developed septic shock 6/7 from abdomen source, required

levo 6/7


s/p 3 new drains 6/7 with brown color drainage, now on vent, Bloody drainage 

again from ROBERT drains / 900 cc in last 24 hr 


2.  Gallstone pancreatitis, now with ongoing bleeding from prior drain. Anemic. 

s/p Tx multiple units over several days


3.  septic shock/sepsis, recurrent 6/7, source abdomen. , off levo now, 


4.  Acute kidney injury-, Off HD--renal function decling. suspect JED on CKD due

to hypotension , improved now


5.  Acute gallstone pancreatitis.


6.  Hypoalbuminemia.


7.  Moderate persistent effusions, s/p left thora  5/12, reaccumulation of left 

effusion. O2 requirement not changed. 


8.  Fever- ,hypotension. suspect recurrent sepsis/ likely pancreatic source.  

Per ID, per surgery--


9.  Chronic anemia-- ongoing / s/p PRBC


10. Covid 19 testing negative


11. Moderate to large ascites-S/P paracentisis


12.S/P paracentisis with 4 liters removed on 4/15/20


13. S/P IR drain placement on 5/8/2020, removal


14. Depression/Anxiety 


1





Plan


.


1. back on vent eariler this am, cxr reviewed, left effusion/infilt/atelectasis,

suspect most of it is effusion, BD and mucumyst added, will do cxr in am, may 

need thoracentesis, no need for bronch, abg reviewed, titrate fio2 to keep sat 

94%


2. Follow all cultures/ PSA from pelvic drains. Abx per ID


3. Follow surgery recs-- Acute pancreatitis with persistent necrosis ---- 4/27 

status post ROBERT drain placement + C paropsilosis; 5/6 + yeast & high amylase; s/p

additional drains on 5/8, removal of drains and now increase pseudocyst and 

increase fluid collection. likely infected fluid/ sepsis. continues to have 

bloody drainage thru drains . 90 cc in last 24 hrs .Initiated BS abx.follow 

surgery rec, planning surgical intervention next few weeks


4. Follow ID recs for ABX----


5. Follow nephrology recs -----


6. Continue TPN 


7. monitor HGB,  4 units since 6/6--monitor HGB transfuse if less than 8.5 


8. hold off on thoracentesis . effusion small to moderate , monitor for now


10. s/p  thoracentesis, 5/12, 3 litres removed


11. Pt remains critically ill from severe necrotic pancreatis. Even with surgery

prognosis grim. Surgery informed family.





     


DVT/GI PPX: protonix--- holding lovenox 2/2 anemia


PT/OT


D/W RN, rt and dr huffman, mother and daughter





critically ill    cc time 30 min no overlap











MAN BLAKE MD               Jun 13, 2020 09:57

## 2020-06-13 NOTE — NUR
Around 0500 pt started to desat. RT to bedside. Increased trach shield to 80%, deep 
suctioned. Pt still with increasing difficulties breathing. Stat cxr ordered. ABG. Dr. Niño notified, orders to put on ventilator. AC 20, tv 500 peep 5, titrate 02 to keep >94%. 
Tried to call Glenna(daughter) twice, cell phone stated unavailable. Left voicemail with 
other daughter (Beth) to call ASAP. Also given one time doses of Lasix and Solumedrol. 
Started fentanyl and propofol for sedation. Will monitor.

## 2020-06-13 NOTE — PDOC
Infectious Disease Note


Subjective


Subjective


Patient lethargic, weak


back on vent


Continues to have blood stained drainage from drains


no fevers last 24 hours





ROS


ROS


unable to obtain





Vital Sign


Vital Signs





Vital Signs








  Date Time  Temp Pulse Resp B/P (MAP) Pulse Ox O2 Delivery O2 Flow Rate FiO2


 


6/13/20 05:50     88 Tracheal Collar 15.0 


 


6/13/20 05:00  130 40 165/76 (105)    


 


6/13/20 04:00 98.3       





 98.3       











Physical Exam


PHYSICAL EXAM


GENERAL: Lethargic, opens eyes transiently


HEENT: NGT in place. Oral mucosa dry


NECK:  Tracheostomy 


LUNGS: Diminished aeration bases, no accessory muscle use 


HEART:  S1, S2, tachy, regular 


ABDOMEN: Mild distention, bowel sounds present, soft, grimaces to palpation 


Right lateral side drainage bag, 3 drains with bloodstained drainage


: [place] ( 6/ 7)


EXTREMITIES: Trace edema .no  cyanosis 


SKIN: no signs of gen rash 


CNS: Lethargic very weak


LUE-PICC (5/29) without signs of complications - art line without complications





Labs


Lab





Laboratory Tests








Test


 6/12/20


06:18 6/12/20


12:05 6/12/20


12:45 6/12/20


17:11


 


Glucose (Fingerstick)


 165 mg/dL


(70-99) 158 mg/dL


(70-99) 


 145 mg/dL


(70-99)


 


White Blood Count


 


 


 7.0 x10^3/uL


(4.0-11.0) 





 


Red Blood Count


 


 


 2.99 x10^6/uL


(3.50-5.40) 





 


Hemoglobin


 


 


 8.5 g/dL


(12.0-15.5) 





 


Hematocrit


 


 


 25.7 %


(36.0-47.0) 





 


Mean Corpuscular Volume   86 fL ()  


 


Mean Corpuscular Hemoglobin   29 pg (25-35)  


 


Mean Corpuscular Hemoglobin


Concent 


 


 33 g/dL


(31-37) 





 


Red Cell Distribution Width


 


 


 19.7 %


(11.5-14.5) 





 


Platelet Count


 


 


 347 x10^3/uL


(140-400) 





 


Test


 6/12/20


23:56 6/13/20


04:00 6/13/20


05:20 





 


Glucose (Fingerstick)


 140 mg/dL


(70-99) 


 


 





 


White Blood Count


 


 9.4 x10^3/uL


(4.0-11.0) 


 





 


Red Blood Count


 


 3.27 x10^6/uL


(3.50-5.40) 


 





 


Hemoglobin


 


 9.2 g/dL


(12.0-15.5) 


 





 


Hematocrit


 


 27.7 %


(36.0-47.0) 


 





 


Mean Corpuscular Volume  85 fL ()   


 


Mean Corpuscular Hemoglobin  28 pg (25-35)   


 


Mean Corpuscular Hemoglobin


Concent 


 33 g/dL


(31-37) 


 





 


Red Cell Distribution Width


 


 20.1 %


(11.5-14.5) 


 





 


Platelet Count


 


 433 x10^3/uL


(140-400) 


 





 


Neutrophils (%) (Auto)  76 % (31-73)   


 


Lymphocytes (%) (Auto)  17 % (24-48)   


 


Monocytes (%) (Auto)  5 % (0-9)   


 


Eosinophils (%) (Auto)  2 % (0-3)   


 


Basophils (%) (Auto)  0 % (0-3)   


 


Neutrophils # (Auto)


 


 7.2 x10^3/uL


(1.8-7.7) 


 





 


Lymphocytes # (Auto)


 


 1.6 x10^3/uL


(1.0-4.8) 


 





 


Monocytes # (Auto)


 


 0.5 x10^3/uL


(0.0-1.1) 


 





 


Eosinophils # (Auto)


 


 0.2 x10^3/uL


(0.0-0.7) 


 





 


Basophils # (Auto)


 


 0.0 x10^3/uL


(0.0-0.2) 


 





 


Sodium Level


 


 146 mmol/L


(136-145) 


 





 


Potassium Level


 


 3.8 mmol/L


(3.5-5.1) 


 





 


Chloride Level


 


 112 mmol/L


() 


 





 


Carbon Dioxide Level


 


 24 mmol/L


(21-32) 


 





 


Anion Gap  10 (6-14)   


 


Blood Urea Nitrogen


 


 25 mg/dL


(7-20) 


 





 


Creatinine


 


 0.7 mg/dL


(0.6-1.0) 


 





 


Estimated GFR


(Cockcroft-Gault) 


 88.9 


 


 





 


Glucose Level


 


 151 mg/dL


(70-99) 


 





 


Calcium Level


 


 10.1 mg/dL


(8.5-10.1) 


 





 


O2 Saturation   89 % (92-99)  


 


Arterial Blood pH


 


 


 7.29


(7.35-7.45) 





 


Arterial Blood pCO2 at


Patient Temp 


 


 50 mmHg


(35-46) 





 


Arterial Blood pO2 at Patient


Temp 


 


 64 mmHg


() 





 


Arterial Blood HCO3


 


 


 24 mmol/L


(21-28) 





 


Arterial Blood Base Excess


 


 


 -3 mmol/L


(-3-3) 











Micro


6/7 





GRAM STAIN  Final  


        Final





        GRAM NEGATIVE RODS:MODERATE


        SQUAMOUS EPI CELL:NOT APPLICABLE


        PMN (WBCs):RARE


        YEAST:MODERATE


        Unless otherwise specified, Testing Performed by:


        [address] [address]


        For Inquires, the Physician may contact the Microbiology


        department at [phone]





  ANAEROBIC-AEROBIC CULTURE  Preliminary  


        Preliminary





        MANY GRAM NEGATIVE RODS on 06/09/20 at 1158


        FINAL ID= [PSEUDOMONAS AERUGINOSA]


        PSEUDOMONAS AERUGINOSA





  ANTIMICROBIAL SUSCEPTIBILITY  Preliminary  


        Comment





        NEG ANSON 56


        PSEUDOMONAS AERUGINOSA


        ANTIBIOTIC                        RESULT          INTERPRETATION


        AMIKACIN                          <=16                  S


        AZTREONAM                         >16                   R


        CEFTAZIDIME                       >16                   R


        CIPROFLOXACIN                     <=0.25                S


        CEFEPIME                          16                    I


        CEFTAZIDIME/AVIBACTAM             <=4                   S


        GENTAMICIN                        <=2                   S














                               ** CONTINUED ON NEXT PAGE **





 

--------------------------------------------------------------------------------


------------





RUN DATE: 06/11/20                  [facility] *LIVE*               

  PAGE 2   


RUN TIME: 1016                            Specimen Inquiry                    


 

--------------------------------------------------------------------------------


------------





SPEC: [id number]    PATIENT: GENEVAJESENIA                [id number]  

(Continued)


-------------------------------------------

-------------------------------------------------








----------------------------------------------------------------------------

----------------





  Procedure                         Result                                      

         


-------------------

-------------------------------------------------------------------------





  ANTIMICROBIAL SUSCEPTIBILITY  Preliminary   (continued)


        LEVOFLOXACIN                      <=0.5                 S


        MEROPENEM                         <=1                   S


        PIPERACILLIN/TAZOBACTAM           64                    S


        TOBRAMYCIN                        <=2                   S


        Unless otherwise specified, Testing Performed by:


        [address] [address]


        For Inquires, the Physician may contact the Microbiology


        department at [phone]











CT Scan 6/6





IMPRESSION:


1. Removal of the percutaneous pigtail drainage catheters since the prior 


exam. Sequela of pancreatitis with extensive pseudocysts again 


demonstrated, the right-sided collections are slightly larger since the 


prior exam, the left-sided collections are stable. See above.


2. Moderate to large left pleural effusion with atelectasis and collapse 


of most of the left lower lobe, stable. Small right pleural effusion is 


stable.


3. Gallstone.





Objective


Assessment


Patient with prolonged hospitalization


Multiple medical problems


Multiple surgical procedures





CXR with Left lung white-out 6/13


Acute hypoxic resp failure


Fever improving


Acute pancreatitis with persistent  necrosis


CT a/p 4/9


    Increased ascites. Persistent evidence of necrotizing pancreatitis with 

fluid and phlegmon


   at the pancreas


   4/27 status post ROBERT drain placement; yeast


   5/6 fluid  candida parapsilosis fluid amylase high


CT 6/7


IMPRESSION:


1.   Sequela of pancreatitis with extensive pseudocysts again 


demonstrated, the right-sided collections are slightly larger since the 


prior exam, the left-sided collections are stable. 


6/7 fluid cult PSAE (MDRO),yeast moderate s/p drain times three





Cholelithiasis with thickening of the gallbladder wall.


Leucocytosis - better


JED,Hyperkalemia, Metabolic acidosis off dialysis


Acute hypoxic resp failure ,bilateral pleural effusion and atelectasis


hypocalcemia 


Prediabetes


HTN


s/p trach





S/p thoracenteis 5/12





Plan


Plan of Care








Await Pulm F/u - may need bronch - has increased secretions per nursing. ? 

possible plug - currently AF/WBC nml and BP elevated


Continue meropenem, has MDRP PSAE June 7


cont micafungin June 7


Follow-up cultures fluid  for yeast


Monitor a.m. labs


General surgery following


Monitor drain output


Maintain aspiration precaution


Supportive care


Prognosis poor





Critically ill








D/w nursing











RASHAWN ROSEN MD              Jun 13, 2020 06:16

## 2020-06-13 NOTE — PDOC
PROGRESS NOTES


Chief Complaint


Chief Complaint


A/P:


Acute hypoxic Respiratory failure requiring mechanical ventilation (now 

extubated for several days but still with tracheostomy)


Tracheostomy


bilateral pleural effusions/pulm edema 


Sepsis


Severe Acute gallstone pancreatitis (not a surgical candidate at this time) with

necrosis


Acute kidney failure now requiring dialysis


Salpingitis


Gallstones (Calculus of gallbladder with acute cholecystitis without 

obstruction)


HTN 


Leukocytosis 


Hypoxia


Uterine fibroid


Intractable pain


Intractable nausea


Covid 19 negative. 


Acute on chronic anemia 


EEG: No seizure activityFever  - better currently - intermittent could be from 

underlying pancreatitis blood cults 5/4 - neg so far


? Ileus with vomiting


Abd distention - U/S and CT reviewed s/p 0.4 L of opaque, debris-containing 

ascites was removed 5/6


Acute pancreatitis with persistent necrosis


- 4/27 status post ROBERT drain placement + C paropsilosis. s/p additional drains 5 /8


Anemia - S/p PRBCs


Cholelithiasis with thickening of the gallbladder wall.


Leucocytosis improving


JED, hyperkalemia, Metabolic acidosis off dialysis


Acute hypoxic resp failure ,bilateral pleural effusion and atelectasis


hypocalcemia 


Prediabetes


HTN


s/p trach


ESRD on HD


Hyperglycemia





FEN - 


PPX - SCDs, off lovenox currently


FULL CODE


Dispo - ICU, critically ill


CC time 49 minutes





History of Present Illness


History of Present Illness


6/8: IR placed drain on 6/7. 4u PRBC after Hb drop. Hb 8.8 today. Off Levophed 

this morning. T-max 100.3. Much more lethargic today. CXR with left sided dif

fuse infiltrates.


6/9: Tachycardic overnight into the 140s. NGT clamped. On BIPAP currently. 

Drains with serosanguinous discharge. WBC 8, Tmax 99.6F.


6/10: Seen on trach shield in ICU.  Hypertensive and tachycardic. Labs stable. 

blood stained drainage from drains. Afebrile.


6/11: Seen on trach shield in ICU. She is a bit confused, drowsy, but when 

sitting up is conversational and confusion somewhat clears. She is asking for 

more pain medication. Stable drains, still very tachy.Na 147


6/12: Patient vomited overnight. Aspirated. Tried to pull her trach out, she was

told she would die without her trach, she said "I know, I just want to go home".

Hb 7.6. Afebrile, still very tachycardic. 1055ml out of right sided ROBERT drain





Overnight hypoxic, on BIPAP. CXR with left sided white out lung. Significant 

mucous plug suctioned by RT with improvement in her ABG after 2 hours this morn

ing. Not really active, tired, lethargic. 890ml out of drains past 24 hours and 

already with 330ml while I am present. On vent. D/w daughter bedside.








6/5/2020


Patient seen and examined on telemetry floor today


This morning I had a couple of discussions with case management


The issue is the patient will not wear her trach cap


Without that she cannot advance her diet and is currently supposed to be on 

honey thick liquids


I was going to talk to the patient about wearing her trach But when I arrived to

the room she was lying on the floor with several nurses and therapist around


Apparently she did walk to the end of the garsia then back and then collapsed onto

the floor


She had a bowel movement when this all happened


Appears to be critically ill again


Very shaky tremulous anxious has a stare in her eyes


We got her back in bed


I am extremely concerned about her long-term prognosis again











6/4/2020


Patient seen and examined in the ICU


She remains critically ill is is extremely weak


Chart reviewed


Discussed with RN


We decided to try some Prozac as she does seem depressed


On IV TPN


Still has NG tube








6/3/2020


Patient seen and examined in the ICU


She remains critically ill


We have been trying to hold off on her Ativan for the past 24 hours


She is a little more awake but shaky and agitated and anxious seems depressed


Discussed with RN


Chart reviewed








6/2/2020


Patient seen and examined in the ICU


She is a little more alert today but still quite ill


Appears clammy and pale and depressed/anxious


Discussed with RN


Chart reviewed











6/1/2020


Patient seen and examined in the ICU


She appears extremely ill


She is tachypneic at 35 respirations per minute and tachycardic at 132 bpm


She is extremely encephalopathic and shaky


She appears clammy


Chart reviewed


Discussed with RN


Prognosis extremely guarded at best








5/31/2020


Patient still in ICU


Resting with no apparent distress


Chart reviewed








5/30/2020


Patient seen and examined in the ICU


She is wiping her face with a cough


Discussed with RN


Chart reviewed


We hope to get her out of the ICU later today if possible








5/29/2020


Patient seen and examined in the ICU once again


She is back on NG suction


On IV Zosyn


Has IV TPN


Sedated with Precedex but anxious still


Appears somewhat clammy and pale


Chart reviewed


Discussed with RN


She remains critically ill














5/28/2020


Patient seen and examined in the ICU


She has an NG that is clamped we are hoping to start some clear liquids but she 

looks quite ill


She is on IV TPN


Meropenem changed to IV Zosyn (agree)


She has [place] to bedside drainage


She is semi-sedated with Precedex


Chart reviewed


Discussed with RN


She remains critically ill











Seen bedside. Hb 8. She was just a bit hypoxic, had a mucous plug suctioned. 

Able to vocalize well with speaking valve, tells me we are being very hard on 

her and she would like to be drugged back to sleep.  She wants to wake up and 

feel better. On trach shield, 


T max 100.4. afebrile this a.m.





5/26/2020


Patient seen and examined in the ICU


She is up in the chair very frail trying to talk a little shaky


Discussed with RN


Chart reviewed








5/25/2020


Patient seen and examined in the ICU


Patient up in the chair


Having a severe coughing episode with a lot of phlegm coming out of her 

tracheostomy


Discussed with RN


Discussed with physical therapy


Chart reviewed











5/24,.    feels well,  has been out of room in wheelchair


no complaint,    still weak,   some with not wanting to wear her valve on trach


cont other,   may be able to work with speech tomorrow,    kaliwallow OK to 

try, 








5/23,  anxiety is up today,   she dislikes the valve still,    


shower and outside today


.   





5/22 she doesnt want to wear her passy-kristan valve,  discussed str and plan with 

her.


speech following,  needs swallow study,   but needs to wear her valve longer,  


cont current


able to walk some, walker 





5/21  stronger,   better,   


we discussed better oral care


she would like to try swallow study,  wants to try to eat,    speech is 

following








5/20/2020 


She remains in the ICU


sitting up and working with OT,   getting better


if limit pain meds, may do better off the vent, 





Nurses trying to suction her,  that is also improved, 


Chart reviewed


 








5/19/2020


Patient seen and examined in the ICU


She had an episode yesterday of tachycardia and severe agitation we gave her 

some Ativan


After that she seemed to have stroke symptoms but now that the Ativan has wore 

off her stroke symptoms have resolved


 


 


She is on IV meropenem and daptomycin and micafungin


Chart reviewed


Discussed with RN


Patient is still critically ill


 


BRIEF OPERATIVE NOTE


Pre-Op Diagnosis


Pancreatitis with pseudocysts, suspected infection


Post-Op Diagnosis


same


Procedure Performed


CT abdominal Drains x 3


Surgeon


Hardy


Anesthesia Type:  Conscious Sedation


Findings


3 abdominal drains, 14F, with turbid pancreatic fluid and necrotic debris in 

each.


Complications


No immediate








5/9: Patient today somewhat restless and having bilious secretions from ET tube,

imaging studies ordered, discussed with consultant. Pretty poor prognosis, 

hopefully is not a fistula, poor surgical candidate. 





5/10: Imaging with no acute events, she seems more stable today compared to 

yesterday. Encouraged as much activity as possible patient at high risk for 

severe depression.





Vitals


Vitals





Vital Signs








  Date Time  Temp Pulse Resp B/P (MAP) Pulse Ox O2 Delivery O2 Flow Rate FiO2


 


6/13/20 07:13     96 Ventilator  


 


6/13/20 06:31   20     


 


6/13/20 06:00  121  96/56 (69)    


 


6/13/20 05:50       15.0 


 


6/13/20 04:00 98.3       





 98.3       











Physical Exam


Physical Exam


GENERAL: Lethargic, opens eyes transiently


HEENT: NGT in place. Oral mucosa dry


NECK:  Tracheostomy 


LUNGS: Diminished aeration bases, no accessory muscle use 


HEART:  S1, S2, tachy, regular 


ABDOMEN: Mild distention, bowel sounds present, soft, grimaces to palpation 


Right lateral side drainage bag, 3 drains with bloodstained drainage


: [place] ( 6/ 7)


EXTREMITIES: Trace edema .no  cyanosis 


SKIN: no signs of gen rash 


CNS: Lethargic very weak


LUE-PICC (5/29) without signs of complications - art line without complications


General:  Cooperative, No acute distress


Heart:  Regular rate, Normal S1, Normal S2, No murmurs, Gallops


Lungs:  Other (diminshed in bases, Rhonci in LLL)


Abdomen:  Soft, Other (drains in place)


Extremities:  No clubbing, No cyanosis, No edema, Normal pulses, No 

tenderness/swelling


Skin:  Other (warm, dry)





Labs


LABS





Laboratory Tests








Test


 6/12/20


12:05 6/12/20


12:45 6/12/20


17:11 6/12/20


23:56


 


Glucose (Fingerstick)


 158 mg/dL


(70-99) 


 145 mg/dL


(70-99) 140 mg/dL


(70-99)


 


White Blood Count


 


 7.0 x10^3/uL


(4.0-11.0) 


 





 


Red Blood Count


 


 2.99 x10^6/uL


(3.50-5.40) 


 





 


Hemoglobin


 


 8.5 g/dL


(12.0-15.5) 


 





 


Hematocrit


 


 25.7 %


(36.0-47.0) 


 





 


Mean Corpuscular Volume  86 fL ()   


 


Mean Corpuscular Hemoglobin  29 pg (25-35)   


 


Mean Corpuscular Hemoglobin


Concent 


 33 g/dL


(31-37) 


 





 


Red Cell Distribution Width


 


 19.7 %


(11.5-14.5) 


 





 


Platelet Count


 


 347 x10^3/uL


(140-400) 


 





 


Test


 6/13/20


04:00 6/13/20


05:20 


 





 


White Blood Count


 9.4 x10^3/uL


(4.0-11.0) 


 


 





 


Red Blood Count


 3.27 x10^6/uL


(3.50-5.40) 


 


 





 


Hemoglobin


 9.2 g/dL


(12.0-15.5) 


 


 





 


Hematocrit


 27.7 %


(36.0-47.0) 


 


 





 


Mean Corpuscular Volume 85 fL ()    


 


Mean Corpuscular Hemoglobin 28 pg (25-35)    


 


Mean Corpuscular Hemoglobin


Concent 33 g/dL


(31-37) 


 


 





 


Red Cell Distribution Width


 20.1 %


(11.5-14.5) 


 


 





 


Platelet Count


 433 x10^3/uL


(140-400) 


 


 





 


Neutrophils (%) (Auto) 76 % (31-73)    


 


Lymphocytes (%) (Auto) 17 % (24-48)    


 


Monocytes (%) (Auto) 5 % (0-9)    


 


Eosinophils (%) (Auto) 2 % (0-3)    


 


Basophils (%) (Auto) 0 % (0-3)    


 


Neutrophils # (Auto)


 7.2 x10^3/uL


(1.8-7.7) 


 


 





 


Lymphocytes # (Auto)


 1.6 x10^3/uL


(1.0-4.8) 


 


 





 


Monocytes # (Auto)


 0.5 x10^3/uL


(0.0-1.1) 


 


 





 


Eosinophils # (Auto)


 0.2 x10^3/uL


(0.0-0.7) 


 


 





 


Basophils # (Auto)


 0.0 x10^3/uL


(0.0-0.2) 


 


 





 


Sodium Level


 146 mmol/L


(136-145) 


 


 





 


Potassium Level


 3.8 mmol/L


(3.5-5.1) 


 


 





 


Chloride Level


 112 mmol/L


() 


 


 





 


Carbon Dioxide Level


 24 mmol/L


(21-32) 


 


 





 


Anion Gap 10 (6-14)    


 


Blood Urea Nitrogen


 25 mg/dL


(7-20) 


 


 





 


Creatinine


 0.7 mg/dL


(0.6-1.0) 


 


 





 


Estimated GFR


(Cockcroft-Gault) 88.9 


 


 


 





 


Glucose Level


 151 mg/dL


(70-99) 


 


 





 


Calcium Level


 10.1 mg/dL


(8.5-10.1) 


 


 





 


O2 Saturation  89 % (92-99)   


 


Arterial Blood pH


 


 7.29


(7.35-7.45) 


 





 


Arterial Blood pCO2 at


Patient Temp 


 50 mmHg


(35-46) 


 





 


Arterial Blood pO2 at Patient


Temp 


 64 mmHg


() 


 





 


Arterial Blood HCO3


 


 24 mmol/L


(21-28) 


 





 


Arterial Blood Base Excess


 


 -3 mmol/L


(-3-3) 


 














Assessment and Plan


Assessmemt and Plan


Problems


Medical Problems:


(1) Acute pancreatitis


Status: Acute  





(2) Cholelithiasis


Status: Acute  











Comment


Review of Relevant


I have reviewed the following items josy (where applicable) has been applied.


Labs





Laboratory Tests








Test


 6/11/20


11:32 6/12/20


06:05 6/12/20


06:18 6/12/20


12:05


 


Glucose (Fingerstick)


 163 mg/dL


(70-99) 


 165 mg/dL


(70-99) 158 mg/dL


(70-99)


 


White Blood Count


 


 8.7 x10^3/uL


(4.0-11.0) 


 





 


Red Blood Count


 


 2.56 x10^6/uL


(3.50-5.40) 


 





 


Hemoglobin


 


 7.6 g/dL


(12.0-15.5) 


 





 


Hematocrit


 


 22.9 %


(36.0-47.0) 


 





 


Mean Corpuscular Volume  90 fL ()   


 


Mean Corpuscular Hemoglobin  30 pg (25-35)   


 


Mean Corpuscular Hemoglobin


Concent 


 33 g/dL


(31-37) 


 





 


Red Cell Distribution Width


 


 16.6 %


(11.5-14.5) 


 





 


Platelet Count


 


 349 x10^3/uL


(140-400) 


 





 


Neutrophils (%) (Auto)  74 % (31-73)   


 


Lymphocytes (%) (Auto)  18 % (24-48)   


 


Monocytes (%) (Auto)  6 % (0-9)   


 


Eosinophils (%) (Auto)  1 % (0-3)   


 


Basophils (%) (Auto)  0 % (0-3)   


 


Neutrophils # (Auto)


 


 6.5 x10^3/uL


(1.8-7.7) 


 





 


Lymphocytes # (Auto)


 


 1.6 x10^3/uL


(1.0-4.8) 


 





 


Monocytes # (Auto)


 


 0.6 x10^3/uL


(0.0-1.1) 


 





 


Eosinophils # (Auto)


 


 0.1 x10^3/uL


(0.0-0.7) 


 





 


Basophils # (Auto)


 


 0.0 x10^3/uL


(0.0-0.2) 


 





 


Sodium Level


 


 146 mmol/L


(136-145) 


 





 


Potassium Level


 


 3.9 mmol/L


(3.5-5.1) 


 





 


Chloride Level


 


 114 mmol/L


() 


 





 


Carbon Dioxide Level


 


 25 mmol/L


(21-32) 


 





 


Anion Gap  7 (6-14)   


 


Blood Urea Nitrogen


 


 29 mg/dL


(7-20) 


 





 


Creatinine


 


 0.8 mg/dL


(0.6-1.0) 


 





 


Estimated GFR


(Cockcroft-Gault) 


 76.2 


 


 





 


BUN/Creatinine Ratio  36 (6-20)   


 


Glucose Level


 


 176 mg/dL


(70-99) 


 





 


Calcium Level


 


 9.8 mg/dL


(8.5-10.1) 


 





 


Total Bilirubin


 


 0.4 mg/dL


(0.2-1.0) 


 





 


Aspartate Amino Transf


(AST/SGOT) 


 22 U/L (15-37) 


 


 





 


Alanine Aminotransferase


(ALT/SGPT) 


 16 U/L (14-59) 


 


 





 


Alkaline Phosphatase


 


 83 U/L


() 


 





 


Total Protein


 


 5.1 g/dL


(6.4-8.2) 


 





 


Albumin


 


 1.1 g/dL


(3.4-5.0) 


 





 


Albumin/Globulin Ratio  0.3 (1.0-1.7)   


 


Test


 6/12/20


12:45 6/12/20


17:11 6/12/20


23:56 6/13/20


04:00


 


White Blood Count


 7.0 x10^3/uL


(4.0-11.0) 


 


 9.4 x10^3/uL


(4.0-11.0)


 


Red Blood Count


 2.99 x10^6/uL


(3.50-5.40) 


 


 3.27 x10^6/uL


(3.50-5.40)


 


Hemoglobin


 8.5 g/dL


(12.0-15.5) 


 


 9.2 g/dL


(12.0-15.5)


 


Hematocrit


 25.7 %


(36.0-47.0) 


 


 27.7 %


(36.0-47.0)


 


Mean Corpuscular Volume 86 fL ()    85 fL () 


 


Mean Corpuscular Hemoglobin 29 pg (25-35)    28 pg (25-35) 


 


Mean Corpuscular Hemoglobin


Concent 33 g/dL


(31-37) 


 


 33 g/dL


(31-37)


 


Red Cell Distribution Width


 19.7 %


(11.5-14.5) 


 


 20.1 %


(11.5-14.5)


 


Platelet Count


 347 x10^3/uL


(140-400) 


 


 433 x10^3/uL


(140-400)


 


Glucose (Fingerstick)


 


 145 mg/dL


(70-99) 140 mg/dL


(70-99) 





 


Neutrophils (%) (Auto)    76 % (31-73) 


 


Lymphocytes (%) (Auto)    17 % (24-48) 


 


Monocytes (%) (Auto)    5 % (0-9) 


 


Eosinophils (%) (Auto)    2 % (0-3) 


 


Basophils (%) (Auto)    0 % (0-3) 


 


Neutrophils # (Auto)


 


 


 


 7.2 x10^3/uL


(1.8-7.7)


 


Lymphocytes # (Auto)


 


 


 


 1.6 x10^3/uL


(1.0-4.8)


 


Monocytes # (Auto)


 


 


 


 0.5 x10^3/uL


(0.0-1.1)


 


Eosinophils # (Auto)


 


 


 


 0.2 x10^3/uL


(0.0-0.7)


 


Basophils # (Auto)


 


 


 


 0.0 x10^3/uL


(0.0-0.2)


 


Sodium Level


 


 


 


 146 mmol/L


(136-145)


 


Potassium Level


 


 


 


 3.8 mmol/L


(3.5-5.1)


 


Chloride Level


 


 


 


 112 mmol/L


()


 


Carbon Dioxide Level


 


 


 


 24 mmol/L


(21-32)


 


Anion Gap    10 (6-14) 


 


Blood Urea Nitrogen


 


 


 


 25 mg/dL


(7-20)


 


Creatinine


 


 


 


 0.7 mg/dL


(0.6-1.0)


 


Estimated GFR


(Cockcroft-Gault) 


 


 


 88.9 





 


Glucose Level


 


 


 


 151 mg/dL


(70-99)


 


Calcium Level


 


 


 


 10.1 mg/dL


(8.5-10.1)


 


Test


 6/13/20


05:20 


 


 





 


O2 Saturation 89 % (92-99)    


 


Arterial Blood pH


 7.29


(7.35-7.45) 


 


 





 


Arterial Blood pCO2 at


Patient Temp 50 mmHg


(35-46) 


 


 





 


Arterial Blood pO2 at Patient


Temp 64 mmHg


() 


 


 





 


Arterial Blood HCO3


 24 mmol/L


(21-28) 


 


 





 


Arterial Blood Base Excess


 -3 mmol/L


(-3-3) 


 


 











Laboratory Tests








Test


 6/12/20


12:05 6/12/20


12:45 6/12/20


17:11 6/12/20


23:56


 


Glucose (Fingerstick)


 158 mg/dL


(70-99) 


 145 mg/dL


(70-99) 140 mg/dL


(70-99)


 


White Blood Count


 


 7.0 x10^3/uL


(4.0-11.0) 


 





 


Red Blood Count


 


 2.99 x10^6/uL


(3.50-5.40) 


 





 


Hemoglobin


 


 8.5 g/dL


(12.0-15.5) 


 





 


Hematocrit


 


 25.7 %


(36.0-47.0) 


 





 


Mean Corpuscular Volume  86 fL ()   


 


Mean Corpuscular Hemoglobin  29 pg (25-35)   


 


Mean Corpuscular Hemoglobin


Concent 


 33 g/dL


(31-37) 


 





 


Red Cell Distribution Width


 


 19.7 %


(11.5-14.5) 


 





 


Platelet Count


 


 347 x10^3/uL


(140-400) 


 





 


Test


 6/13/20


04:00 6/13/20


05:20 


 





 


White Blood Count


 9.4 x10^3/uL


(4.0-11.0) 


 


 





 


Red Blood Count


 3.27 x10^6/uL


(3.50-5.40) 


 


 





 


Hemoglobin


 9.2 g/dL


(12.0-15.5) 


 


 





 


Hematocrit


 27.7 %


(36.0-47.0) 


 


 





 


Mean Corpuscular Volume 85 fL ()    


 


Mean Corpuscular Hemoglobin 28 pg (25-35)    


 


Mean Corpuscular Hemoglobin


Concent 33 g/dL


(31-37) 


 


 





 


Red Cell Distribution Width


 20.1 %


(11.5-14.5) 


 


 





 


Platelet Count


 433 x10^3/uL


(140-400) 


 


 





 


Neutrophils (%) (Auto) 76 % (31-73)    


 


Lymphocytes (%) (Auto) 17 % (24-48)    


 


Monocytes (%) (Auto) 5 % (0-9)    


 


Eosinophils (%) (Auto) 2 % (0-3)    


 


Basophils (%) (Auto) 0 % (0-3)    


 


Neutrophils # (Auto)


 7.2 x10^3/uL


(1.8-7.7) 


 


 





 


Lymphocytes # (Auto)


 1.6 x10^3/uL


(1.0-4.8) 


 


 





 


Monocytes # (Auto)


 0.5 x10^3/uL


(0.0-1.1) 


 


 





 


Eosinophils # (Auto)


 0.2 x10^3/uL


(0.0-0.7) 


 


 





 


Basophils # (Auto)


 0.0 x10^3/uL


(0.0-0.2) 


 


 





 


Sodium Level


 146 mmol/L


(136-145) 


 


 





 


Potassium Level


 3.8 mmol/L


(3.5-5.1) 


 


 





 


Chloride Level


 112 mmol/L


() 


 


 





 


Carbon Dioxide Level


 24 mmol/L


(21-32) 


 


 





 


Anion Gap 10 (6-14)    


 


Blood Urea Nitrogen


 25 mg/dL


(7-20) 


 


 





 


Creatinine


 0.7 mg/dL


(0.6-1.0) 


 


 





 


Estimated GFR


(Cockcroft-Gault) 88.9 


 


 


 





 


Glucose Level


 151 mg/dL


(70-99) 


 


 





 


Calcium Level


 10.1 mg/dL


(8.5-10.1) 


 


 





 


O2 Saturation  89 % (92-99)   


 


Arterial Blood pH


 


 7.29


(7.35-7.45) 


 





 


Arterial Blood pCO2 at


Patient Temp 


 50 mmHg


(35-46) 


 





 


Arterial Blood pO2 at Patient


Temp 


 64 mmHg


() 


 





 


Arterial Blood HCO3


 


 24 mmol/L


(21-28) 


 





 


Arterial Blood Base Excess


 


 -3 mmol/L


(-3-3) 


 











Microbiology


6/7/20 Gram Stain - Final, Resulted


         


6/7/20 Aerobic and Anaerobic Culture - Preliminary, Resulted


         


6/7/20 Antimicrobic Susceptibility - Preliminary, Resulted


         


6/7/20 Blood Culture - Final, Complete


         NO GROWTH AFTER 5 DAYS


6/7/20 Urine Culture - Final, Complete


         


5/30/20 Gram Stain - Final, Complete


          


5/30/20 Aerobic Culture - Final, Complete


Medications





Current Medications


Sodium Chloride 1,000 ml @  1,000 mls/hr Q1H IV  Last administered on 3/16/20at 

03:00;  Start 3/16/20 at 03:00;  Stop 3/16/20 at 03:59;  Status DC


Ondansetron HCl (Zofran) 4 mg 1X  ONCE IVP  Last administered on 3/16/20at 

03:27;  Start 3/16/20 at 03:00;  Stop 3/16/20 at 03:01;  Status DC


Morphine Sulfate (Morphine Sulfate) 4 mg 1X  ONCE IV ;  Start 3/16/20 at 03:00; 

Stop 3/16/20 at 03:01;  Status Cancel


Ketorolac Tromethamine (Toradol 30mg Vial) 30 mg 1X  ONCE IV  Last administered 

on 3/16/20at 02:54;  Start 3/16/20 at 03:00;  Stop 3/16/20 at 03:01;  Status DC


Fentanyl Citrate (Fentanyl 2ml Vial) 25 mcg 1X  ONCE IVP  Last administered on 

3/16/20at 03:23;  Start 3/16/20 at 03:30;  Stop 3/16/20 at 03:31;  Status DC


Fentanyl Citrate (Fentanyl 2ml Vial) 100 mcg STK-MED ONCE .ROUTE ;  Start 

3/16/20 at 03:18;  Stop 3/16/20 at 03:18;  Status DC


Iohexol (Omnipaque 350 Mg/ml) 90 ml 1X  ONCE IV  Last administered on 3/16/20at 

03:25;  Start 3/16/20 at 03:30;  Stop 3/16/20 at 03:31;  Status DC


Info (CONTRAST GIVEN -- Rx MONITORING) 1 each PRN DAILY  PRN MC SEE COMMENTS;  

Start 3/16/20 at 03:30;  Stop 3/18/20 at 03:29;  Status DC


Hydromorphone HCl (Dilaudid) 0.5 mg 1X  ONCE IV  Last administered on 3/16/20at 

03:55;  Start 3/16/20 at 04:30;  Stop 3/16/20 at 04:32;  Status DC


Ondansetron HCl (Zofran) 4 mg PRN Q8HRS  PRN IV NAUSEA/VOMITING 1ST CHOICE;  

Start 3/16/20 at 05:00;  Stop 3/16/20 at 09:27;  Status DC


Morphine Sulfate (Morphine Sulfate) 2 mg PRN Q2HR  PRN IV SEVERE PAIN 7-10 Last 

administered on 3/17/20at 12:26;  Start 3/16/20 at 05:00;  Stop 3/17/20 at 

14:15;  Status DC


Sodium Chloride 1,000 ml @  125 mls/hr Q8H IV  Last administered on 3/16/20at 

20:56;  Start 3/16/20 at 05:00;  Stop 3/17/20 at 04:59;  Status DC


Hydromorphone HCl (Dilaudid) 0.5 mg PRN Q3HRS  PRN IV SEVERE PAIN 7-10 Last 

administered on 3/17/20at 10:06;  Start 3/16/20 at 05:00;  Stop 3/17/20 at 

12:01;  Status DC


Piperacillin Sod/ Tazobactam Sod 4.5 gm/Sodium Chloride 100 ml @  200 mls/hr 1X 

ONCE IV  Last administered on 3/16/20at 05:44;  Start 3/16/20 at 06:00;  Stop 

3/16/20 at 06:29;  Status DC


Ondansetron HCl (Zofran) 4 mg PRN Q4HRS  PRN IV NAUSEA/VOMITING 1ST CHOICE Last 

administered on 6/12/20at 21:34;  Start 3/16/20 at 09:30


Insulin Human Lispro (HumaLOG) 0-9 UNITS Q6HRS SQ  Last administered on 

6/12/20at 12:11;  Start 3/16/20 at 09:30


Dextrose (Dextrose 50%-Water Syringe) 12.5 gm PRN Q15MIN  PRN IV SEE COMMENTS;  

Start 3/16/20 at 09:30


Pantoprazole Sodium (PROTONIX VIAL for IV PUSH) 40 mg DAILYAC IVP  Last 

administered on 6/12/20at 08:41;  Start 3/16/20 at 11:30


Prochlorperazine Edisylate (Compazine) 10 mg PRN Q6HRS  PRN IV NAUSEA/VOMITING, 

2nd CHOICE Last administered on 6/10/20at 14:33;  Start 3/16/20 at 17:45


Atenolol (Tenormin) 100 mg DAILY PO ;  Start 3/17/20 at 09:00;  Stop 3/16/20 at 

20:08;  Status DC


Metoprolol Tartrate (Lopressor Vial) 2.5 mg Q6HRS IVP  Last administered on 

3/17/20at 05:51;  Start 3/16/20 at 20:15;  Stop 3/17/20 at 10:02;  Status DC


Metoprolol Tartrate (Lopressor Vial) 5 mg Q6HRS IVP  Last administered on 

3/26/20at 00:12;  Start 3/17/20 at 10:15;  Stop 3/28/20 at 08:48;  Status DC


Hydromorphone HCl (Dilaudid) 1 mg PRN Q3HRS  PRN IV SEVERE PAIN 7-10 Last 

administered on 3/23/20at 05:13;  Start 3/17/20 at 12:00;  Stop 3/31/20 at 

00:25;  Status DC


Lidocaine HCl (Buffered Lidocaine 1%) 3 ml STK-MED ONCE .ROUTE ;  Start 3/17/20 

at 12:55;  Stop 3/17/20 at 12:56;  Status DC


Albumin Human 500 ml @  125 mls/hr 1X  ONCE IV  Last administered on 3/17/20at 

14:33;  Start 3/17/20 at 14:30;  Stop 3/17/20 at 18:32;  Status DC


Norepinephrine Bitartrate 8 mg/ Dextrose 258 ml @  17.299 mls/ hr CONT  PRN IV 

PER PROTOCOL Last administered on 4/14/20at 12:48;  Start 3/17/20 at 15:30;  

Stop 4/17/20 at 09:19;  Status DC


Sodium Chloride 1,000 ml @  125 mls/hr Q8H IV  Last administered on 3/17/20at 

21:04;  Start 3/17/20 at 16:00;  Stop 3/18/20 at 02:42;  Status DC


Albumin Human 500 ml @  125 mls/hr PRN BID  PRN IV After every 2L NSS & BP < 

90mm Last administered on 6/6/20at 11:40;  Start 3/17/20 at 16:00


Iohexol (Omnipaque 300 Mg/ml) 60 ml 1X  ONCE IV  Last administered on 3/17/20at 

17:20;  Start 3/17/20 at 17:00;  Stop 3/17/20 at 17:01;  Status DC


Info (CONTRAST GIVEN -- Rx MONITORING) 1 each PRN DAILY  PRN MC SEE COMMENTS;  

Start 3/17/20 at 17:00;  Stop 3/19/20 at 16:59;  Status DC


Meropenem 1 gm/ Sodium Chloride 100 ml @  200 mls/hr Q8HRS IV  Last administered

on 3/18/20at 05:45;  Start 3/17/20 at 20:00;  Stop 3/18/20 at 08:48;  Status DC


Furosemide (Lasix) 40 mg 1X  ONCE IVP  Last administered on 3/17/20at 22:12;  

Start 3/17/20 at 22:30;  Stop 3/17/20 at 22:31;  Status DC


Calcium Chloride 1000 mg/Sodium Chloride 110 ml @  220 mls/hr 1X  ONCE IV  Last 

administered on 3/17/20at 22:11;  Start 3/17/20 at 22:30;  Stop 3/17/20 at 

22:59;  Status DC


Albuterol Sulfate (Ventolin Neb Soln) 2.5 mg 1X  ONCE NEB  Last administered on 

3/18/20at 00:56;  Start 3/17/20 at 22:30;  Stop 3/17/20 at 22:31;  Status DC


Insulin Human Regular (HumuLIN R VIAL) 5 unit 1X  ONCE IV  Last administered on 

3/17/20at 22:14;  Start 3/17/20 at 22:30;  Stop 3/17/20 at 22:31;  Status DC


Magnesium Sulfate 50 ml @ 25 mls/hr 1X  ONCE IV  Last administered on 3/18/20at 

02:57;  Start 3/18/20 at 03:00;  Stop 3/18/20 at 04:59;  Status DC


Calcium Gluconate 1000 mg/Sodium Chloride 110 ml @  220 mls/hr 1X  ONCE IV  Last

administered on 3/18/20at 02:46;  Start 3/18/20 at 03:00;  Stop 3/18/20 at 

03:29;  Status DC


Sodium Chloride 1,000 ml @  200 mls/hr Q5H IV  Last administered on 3/18/20at 

02:46;  Start 3/18/20 at 03:00;  Stop 3/18/20 at 10:21;  Status DC


Calcium Gluconate 1000 mg/Sodium Chloride 110 ml @  220 mls/hr 1X  ONCE IV  Last

administered on 3/18/20at 03:21;  Start 3/18/20 at 03:30;  Stop 3/18/20 at 

03:59;  Status DC


Sodium Bicarbonate 50 meq/Sodium Chloride 1,050 ml @  75 mls/hr Q14H IV  Last 

administered on 3/22/20at 21:10;  Start 3/18/20 at 07:30;  Stop 3/23/20 at 

10:28;  Status DC


Calcium Gluconate 2000 mg/Sodium Chloride 120 ml @  220 mls/hr 1X  ONCE IV  Last

administered on 3/18/20at 09:05;  Start 3/18/20 at 07:30;  Stop 3/18/20 at 

08:02;  Status DC


Lidocaine HCl (Xylocaine-Mpf 1% 2ml Vial) 2 ml STK-MED ONCE .ROUTE ;  Start 

3/18/20 at 08:47;  Stop 3/18/20 at 08:47;  Status DC


Meropenem 500 mg/ Sodium Chloride 50 ml @  100 mls/hr Q12HR IV  Last a

dministered on 3/23/20at 21:01;  Start 3/18/20 at 18:00;  Stop 3/24/20 at 07:58;

 Status DC


Lidocaine HCl (Buffered Lidocaine 1%) 3 ml STK-MED ONCE .ROUTE ;  Start 3/18/20 

at 09:46;  Stop 3/18/20 at 09:46;  Status DC


Lidocaine HCl (Buffered Lidocaine 1%) 6 ml 1X  ONCE INJ  Last administered on 

3/18/20at 10:26;  Start 3/18/20 at 10:15;  Stop 3/18/20 at 10:16;  Status DC


Info (Tpn Per Pharmacy) 1 each PRN DAILY  PRN MC SEE COMMENTS Last administered 

on 6/12/20at 13:00;  Start 3/18/20 at 12:00


Sodium Chloride 1,000 ml @  1,000 mls/hr Q1H PRN IV hypotension;  Start 3/18/20 

at 12:07;  Stop 3/18/20 at 18:06;  Status DC


Diphenhydramine HCl (Benadryl) 25 mg 1X PRN  PRN IV ITCHING;  Start 3/18/20 at 

12:15;  Stop 3/19/20 at 12:14;  Status DC


Diphenhydramine HCl (Benadryl) 25 mg 1X PRN  PRN IV ITCHING;  Start 3/18/20 at 

12:15;  Stop 3/19/20 at 12:14;  Status DC


Sodium Chloride 1,000 ml @  400 mls/hr Q2H30M PRN IV PATENCY;  Start 3/18/20 at 

12:07;  Stop 3/19/20 at 00:06;  Status DC


Info (PHARMACY MONITORING -- do not chart) 1 each PRN DAILY  PRN MC SEE 

COMMENTS;  Start 3/18/20 at 12:15;  Stop 3/20/20 at 08:13;  Status DC


Sodium Chloride 90 meq/Calcium Gluconate 10 meq/ Multivitamins 10 ml/Chromium/ 

Copper/Manganese/ Seleni/Zn 1 ml/ Total Parenteral Nutrition/Amino Acids/D

extrose/ Fat Emulsion Intravenous 55.005 ml  @ 2.292 mls/hr TPN  CONT IV ;  

Start 3/18/20 at 22:00;  Stop 3/18/20 at 12:33;  Status DC


Info (Tpn Per Pharmacy) 1 each PRN DAILY  PRN MC SEE COMMENTS;  Start 3/18/20 at

12:30;  Status UNV


Sodium Chloride 90 meq/Calcium Gluconate 10 meq/ Multivitamins 10 ml/Chromium/ 

Copper/Manganese/ Seleni/Zn 0.5 ml/ Total Parenteral Nutrition/Amino A

cids/Dextrose/ Fat Emulsion Intravenous 1,512 ml @  63 mls/hr TPN  CONT IV  Last

administered on 3/18/20at 22:06;  Start 3/18/20 at 22:00;  Stop 3/19/20 at 

21:59;  Status DC


Calcium Carbonate/ Glycine (Tums) 500 mg PRN AFTMEALHC  PRN PO INDIGESTION;  

Start 3/18/20 at 17:45;  Stop 5/13/20 at 10:25;  Status DC


Calcium Gluconate (Calcium Gluconate) 2,000 mg 1X  ONCE IVP  Last administered 

on 3/19/20at 02:19;  Start 3/19/20 at 02:15;  Stop 3/19/20 at 02:16;  Status DC


Calcium Chloride 3000 mg/Sodium Chloride 1,030 ml @  50 mls/hr [id number] IV  Last 

administered on 3/21/20at 02:17;  Start 3/19/20 at 08:00;  Stop 3/21/20 at 

15:23;  Status DC


Lorazepam (Ativan Inj) 1 mg PRN Q4HRS  PRN IVP ANXIETY / AGITATION, 2nd choic 

Last administered on 4/17/20at 03:51;  Start 3/19/20 at 09:00;  Stop 4/17/20 at 

09:19;  Status DC


Sodium Chloride 1,000 ml @  1,000 mls/hr Q1H PRN IV hypotension;  Start 3/19/20 

at 08:56;  Stop 3/19/20 at 14:55;  Status DC


Albumin Human 200 ml @  200 mls/hr 1X PRN  PRN IV Hypotension;  Start 3/19/20 at

09:00;  Stop 3/19/20 at 14:59;  Status DC


Diphenhydramine HCl (Benadryl) 25 mg 1X PRN  PRN IV ITCHING;  Start 3/19/20 at 

09:00;  Stop 3/20/20 at 08:59;  Status DC


Diphenhydramine HCl (Benadryl) 25 mg 1X PRN  PRN IV ITCHING;  Start 3/19/20 at 

09:00;  Stop 3/20/20 at 08:59;  Status DC


Sodium Chloride 1,000 ml @  400 mls/hr Q2H30M PRN IV PATENCY;  Start 3/19/20 at 

08:56;  Stop 3/19/20 at 20:55;  Status DC


Info (PHARMACY MONITORING -- do not chart) 1 each PRN DAILY  PRN MC SEE 

COMMENTS;  Start 3/19/20 at 09:00;  Status UNV


Info (PHARMACY MONITORING -- do not chart) 1 each PRN DAILY  PRN MC SEE 

COMMENTS;  Start 3/19/20 at 09:00;  Stop 3/20/20 at 08:13;  Status DC


Digoxin (Lanoxin) 500 mcg 1X  ONCE IV  Last administered on 3/19/20at 10:04;  

Start 3/19/20 at 10:00;  Stop 3/19/20 at 10:01;  Status DC


Digoxin (Lanoxin) 125 mcg 1X  ONCE IV  Last administered on 3/19/20at 17:10;  

Start 3/19/20 at 18:00;  Stop 3/19/20 at 18:01;  Status DC


Magnesium Sulfate 100 ml @  25 mls/hr 1X  ONCE IV  Last administered on 

3/19/20at 12:48;  Start 3/19/20 at 13:00;  Stop 3/19/20 at 16:59;  Status DC


Sodium Chloride 90 meq/Magnesium Sulfate 10 meq/ Calcium Gluconate 20 meq/ 

Multivitamins 10 ml/Chromium/ Copper/Manganese/ Seleni/Zn 0.5 ml/ Total 

Parenteral Nutrition/Amino Acids/Dextrose/ Fat Emulsion Intravenous 1,512 ml @  

63 mls/hr TPN  CONT IV  Last administered on 3/19/20at 22:25;  Start 3/19/20 at 

22:00;  Stop 3/20/20 at 21:59;  Status DC


Sodium Chloride 1,000 ml @  1,000 mls/hr Q1H PRN IV hypotension;  Start 3/20/20 

at 08:05;  Stop 3/20/20 at 14:04;  Status DC


Albumin Human 200 ml @  200 mls/hr 1X  ONCE IV  Last administered on 3/20/20at 

08:57;  Start 3/20/20 at 08:15;  Stop 3/20/20 at 09:14;  Status DC


Diphenhydramine HCl (Benadryl) 25 mg 1X PRN  PRN IV ITCHING;  Start 3/20/20 at 

08:15;  Stop 3/21/20 at 08:14;  Status DC


Diphenhydramine HCl (Benadryl) 25 mg 1X PRN  PRN IV ITCHING;  Start 3/20/20 at 

08:15;  Stop 3/21/20 at 08:14;  Status DC


Sodium Chloride 1,000 ml @  400 mls/hr Q2H30M PRN IV PATENCY;  Start 3/20/20 at 

08:05;  Stop 3/20/20 at 20:04;  Status DC


Info (PHARMACY MONITORING -- do not chart) 1 each PRN DAILY  PRN MC SEE 

COMMENTS;  Start 3/20/20 at 08:15;  Stop 3/24/20 at 07:57;  Status DC


Sodium Chloride 90 meq/Potassium Chloride 15 meq/ Potassium Phosphate 10 mmol/ 

Magnesium Sulfate 10 meq/Calcium Gluconate 20 meq/ Multivitamins 10 ml/Chromium/

Copper/Manganese/ Seleni/Zn 0.5 ml/ Total Parenteral Nutrition/Amino 

Acids/Dextrose/ Fat Emulsion Intravenous 1,512 ml @  63 mls/hr TPN  CONT IV  

Last administered on 3/20/20at 21:01;  Start 3/20/20 at 22:00;  Stop 3/21/20 at 

21:59;  Status DC


Potassium Chloride/Water 100 ml @  100 mls/hr 1X  ONCE IV  Last administered on 

3/20/20at 14:09;  Start 3/20/20 at 14:00;  Stop 3/20/20 at 14:59;  Status DC


Benzocaine (Hurricaine One) 1 spray 1X  ONCE MM  Last administered on 3/20/20at 

16:38;  Start 3/20/20 at 14:30;  Stop 3/20/20 at 14:31;  Status DC


Lidocaine HCl (Glydo (Lidocaine) Jelly) 1 thomas 1X  ONCE MM  Last administered on 

3/20/20at 16:38;  Start 3/20/20 at 14:30;  Stop 3/20/20 at 14:31;  Status DC


Linezolid/Dextrose 300 ml @  300 mls/hr Q12HR IV  Last administered on 3/26/20at

21:04;  Start 3/20/20 at 20:00;  Stop 3/27/20 at 07:50;  Status DC


Acetaminophen (Tylenol) 650 mg PRN Q6HRS  PRN PO MILD PAIN / TEMP;  Start 

3/21/20 at 03:30;  Stop 3/21/20 at 03:36;  Status DC


Acetaminophen (Tylenol) 650 mg PRN Q6HRS  PRN PEG MILD PAIN / TEMP Last 

administered on 4/16/20at 19:56;  Start 3/21/20 at 03:36;  Stop 5/13/20 at 

10:25;  Status DC


Sodium Chloride 1,000 ml @  1,000 mls/hr Q1H PRN IV hypotension;  Start 3/21/20 

at 07:50;  Stop 3/21/20 at 13:49;  Status DC


Albumin Human 200 ml @  200 mls/hr 1X PRN  PRN IV Hypotension;  Start 3/21/20 at

08:00;  Stop 3/21/20 at 13:59;  Status DC


Sodium Chloride (Normal Saline Flush) 10 ml 1X PRN  PRN IV AP catheter pack;  

Start 3/21/20 at 08:00;  Stop 3/22/20 at 07:59;  Status DC


Sodium Chloride (Normal Saline Flush) 10 ml 1X PRN  PRN IV  catheter pack;  

Start 3/21/20 at 08:00;  Stop 3/22/20 at 07:59;  Status DC


Sodium Chloride 1,000 ml @  400 mls/hr Q2H30M PRN IV PATENCY;  Start 3/21/20 at 

07:50;  Stop 3/21/20 at 19:49;  Status DC


Info (PHARMACY MONITORING -- do not chart) 1 each PRN DAILY  PRN MC SEE 

COMMENTS;  Start 3/21/20 at 08:00;  Status UNV


Info (PHARMACY MONITORING -- do not chart) 1 each PRN DAILY  PRN MC SEE 

COMMENTS;  Start 3/21/20 at 08:00;  Stop 3/23/20 at 08:25;  Status DC


Sodium Chloride 90 meq/Potassium Chloride 15 meq/ Potassium Phosphate 10 mmol/ 

Magnesium Sulfate 10 meq/Calcium Gluconate 20 meq/ Multivitamins 10 ml/Chromium/

Copper/Manganese/ Seleni/Zn 0.5 ml/ Total Parenteral Nutrition/Amino 

Acids/Dextrose/ Fat Emulsion Intravenous 1,512 ml @  63 mls/hr TPN  CONT IV  

Last administered on 3/21/20at 20:57;  Start 3/21/20 at 22:00;  Stop 3/22/20 at 

21:59;  Status DC


Sodium Chloride 90 meq/Potassium Chloride 15 meq/ Potassium Phosphate 15 mmol/ 

Magnesium Sulfate 10 meq/Calcium Gluconate 20 meq/ Multivitamins 10 ml/Chromium/

Copper/Manganese/ Seleni/Zn 0.5 ml/ Total Parenteral Nutrition/Amino 

Acids/Dextrose/ Fat Emulsion Intravenous 1,512 ml @  63 mls/hr TPN  CONT IV ;  

Start 3/22/20 at 22:00;  Stop 3/22/20 at 14:16;  Status DC


Sodium Chloride 90 meq/Potassium Chloride 15 meq/ Potassium Phosphate 15 mmol/ 

Magnesium Sulfate 10 meq/Calcium Gluconate 20 meq/ Multivitamins 10 ml/Chromium/

Copper/Manganese/ Seleni/Zn 0.5 ml/ Total Parenteral Nutrition/Amino 

Acids/Dextrose/ Fat Emulsion Intravenous 1,200 ml @  50 mls/hr TPN  CONT IV ;  

Start 3/22/20 at 22:00;  Stop 3/22/20 at 14:17;  Status DC


Sodium Chloride 90 meq/Potassium Chloride 15 meq/ Potassium Phosphate 10 mmol/ 

Magnesium Sulfate 10 meq/Calcium Gluconate 20 meq/ Multivitamins 10 ml/Chromium/

Copper/Manganese/ Seleni/Zn 0.5 ml/ Total Parenteral Nutrition/Amino 

Acids/Dextrose/ Fat Emulsion Intravenous 1,200 ml @  50 mls/hr TPN  CONT IV  

Last administered on 3/22/20at 23:29;  Start 3/22/20 at 22:00;  Stop 3/23/20 at 

21:59;  Status DC


Sodium Chloride 1,000 ml @  1,000 mls/hr Q1H PRN IV hypotension;  Start 3/23/20 

at 07:28;  Stop 3/23/20 at 13:27;  Status DC


Albumin Human 200 ml @  200 mls/hr 1X  ONCE IV  Last administered on 3/23/20at 

08:51;  Start 3/23/20 at 07:30;  Stop 3/23/20 at 08:29;  Status DC


Diphenhydramine HCl (Benadryl) 25 mg 1X PRN  PRN IV ITCHING;  Start 3/23/20 at 

07:30;  Stop 3/24/20 at 07:29;  Status DC


Diphenhydramine HCl (Benadryl) 25 mg 1X PRN  PRN IV ITCHING;  Start 3/23/20 at 

07:30;  Stop 3/24/20 at 07:29;  Status DC


Sodium Chloride 1,000 ml @  400 mls/hr Q2H30M PRN IV PATENCY;  Start 3/23/20 at 

07:28;  Stop 3/23/20 at 19:27;  Status DC


Info (PHARMACY MONITORING -- do not chart) 1 each PRN DAILY  PRN MC SEE 

COMMENTS;  Start 3/23/20 at 07:30;  Stop 4/3/20 at 13:01;  Status DC


Metronidazole 100 ml @  100 mls/hr Q6HRS IV  Last administered on 4/8/20at 

06:26;  Start 3/23/20 at 08:30;  Stop 4/8/20 at 09:58;  Status DC


Micafungin Sodium 100 mg/Dextrose 100 ml @  100 mls/hr Q24H IV  Last 

administered on 4/30/20at 08:18;  Start 3/23/20 at 09:00;  Stop 4/30/20 at 

20:58;  Status DC


Propofol 0 ml @ As Directed STK-MED ONCE IV ;  Start 3/23/20 at 07:53;  Stop 

3/23/20 at 07:53;  Status DC


Etomidate (Amidate) 20 mg STK-MED ONCE IV ;  Start 3/23/20 at 07:53;  Stop 

3/23/20 at 07:54;  Status DC


Midazolam HCl (Versed) 5 mg STK-MED ONCE .ROUTE ;  Start 3/23/20 at 07:57;  Stop

3/23/20 at 07:57;  Status DC


Fentanyl Citrate 30 ml @ 0 mls/hr CONT  PRN IV SEE PROTOCOL Last administered on

4/17/20at 06:12;  Start 3/23/20 at 08:15;  Stop 4/17/20 at 09:19;  Status DC


Artificial Tears (Artificial Tears) 1 drop PRN Q1HR  PRN OU DRY EYE, 1st choice;

 Start 3/23/20 at 08:15;  Stop 4/29/20 at 05:31;  Status DC


Midazolam HCl 50 mg/Sodium Chloride 50 ml @ 0 mls/hr CONT  PRN IV SEE PROTOCOL 

Last administered on 3/26/20at 22:39;  Start 3/23/20 at 08:15;  Stop 3/28/20 at 

15:59;  Status DC


Etomidate (Amidate) 8 mg 1X  ONCE IV  Last administered on 3/23/20at 08:33;  

Start 3/23/20 at 08:30;  Stop 3/23/20 at 08:31;  Status DC


Succinylcholine Chloride (Anectine) 120 mg 1X  ONCE IV  Last administered on 

3/23/20at 08:34;  Start 3/23/20 at 08:30;  Stop 3/23/20 at 08:31;  Status DC


Midazolam HCl (Versed) 5 mg 1X  ONCE IV ;  Start 3/23/20 at 08:30;  Stop 3/23/20

at 08:31;  Status DC


Potassium Chloride 15 meq/ Bicarbonate Dialysis Soln w/ out KCl 5,007.5 ml  @ 

1,000 mls/ hr Q5H1M IV  Last administered on 3/24/20at 11:11;  Start 3/23/20 at 

12:00;  Stop 3/24/20 at 11:15;  Status DC


Potassium Chloride 15 meq/ Bicarbonate Dialysis Soln w/ out KCl 5,007.5 ml  @ 

1,000 mls/ hr Q5H1M IV  Last administered on 3/24/20at 11:12;  Start 3/23/20 at 

12:00;  Stop 3/24/20 at 11:17;  Status DC


Potassium Chloride 15 meq/ Bicarbonate Dialysis Soln w/ out KCl 5,007.5 ml  @ 

1,000 mls/ hr Q5H1M IV  Last administered on 3/24/20at 11:11;  Start 3/23/20 at 

12:00;  Stop 3/24/20 at 11:19;  Status DC


Sodium Chloride 90 meq/Potassium Chloride 15 meq/ Potassium Phosphate 10 mmol/ 

Magnesium Sulfate 10 meq/Calcium Gluconate 20 meq/ Multivitamins 10 ml/Chromium/

Copper/Manganese/ Seleni/Zn 0.5 ml/ Total Parenteral Nutrition/Amino 

Acids/Dextrose/ Fat Emulsion Intravenous 1,400 ml @  58.333 mls/ hr TPN  CONT IV

 Last administered on 3/23/20at 21:42;  Start 3/23/20 at 22:00;  Stop 3/24/20 at

21:59;  Status DC


Heparin Sodium (Porcine) (Heparin Sodium) 5,000 unit Q8HRS SQ  Last administered

on 3/28/20at 05:55;  Start 3/23/20 at 15:00;  Stop 3/28/20 at 13:28;  Status DC


Meropenem 500 mg/ Sodium Chloride 50 ml @  100 mls/hr Q6HRS IV  Last 

administered on 3/25/20at 06:00;  Start 3/24/20 at 09:00;  Stop 3/25/20 at 

07:29;  Status DC


Potassium Phosphate 20 mmol/ Sodium Chloride 106.6667 ml @  51.667 m... 1X  ONCE

IV  Last administered on 3/24/20at 11:22;  Start 3/24/20 at 10:15;  Stop 3/24/20

at 12:18;  Status DC


Acetaminophen (Tylenol Supp) 650 mg PRN Q6HRS  PRN FL MILD PAIN / TEMP > 100.3'F

Last administered on 6/10/20at 22:16;  Start 3/24/20 at 10:30


Potassium Chloride/Water 100 ml @  100 mls/hr Q1H IV  Last administered on 

3/24/20at 12:12;  Start 3/24/20 at 11:00;  Stop 3/24/20 at 12:59;  Status DC


Potassium Chloride 20 meq/ Bicarbonate Dialysis Soln w/ out KCl 5,010 ml @  

1,000 mls/hr Q5H1M IV  Last administered on 3/25/20at 08:48;  Start 3/24/20 at 

12:00;  Stop 3/25/20 at 13:03;  Status DC


Potassium Chloride 20 meq/ Bicarbonate Dialysis Soln w/ out KCl 5,010 ml @  

1,000 mls/hr Q5H1M IV  Last administered on 3/29/20at 14:52;  Start 3/24/20 at 

11:30;  Stop 3/29/20 at 19:59;  Status DC


Potassium Chloride 20 meq/ Bicarbonate Dialysis Soln w/ out KCl 5,010 ml @  

1,000 mls/hr Q5H1M IV  Last administered on 3/29/20at 14:53;  Start 3/24/20 at 

11:30;  Stop 3/29/20 at 19:59;  Status DC


Sodium Chloride 90 meq/Potassium Chloride 15 meq/ Potassium Phosphate 15 mmol/ 

Magnesium Sulfate 10 meq/Calcium Gluconate 15 meq/ Multivitamins 10 ml/Chromium/

Copper/Manganese/ Seleni/Zn 0.5 ml/ Total Parenteral Nutrition/Amino 

Acids/Dextrose/ Fat Emulsion Intravenous 1,400 ml @  58.333 mls/ hr TPN  CONT IV

 Last administered on 3/24/20at 22:17;  Start 3/24/20 at 22:00;  Stop 3/25/20 at

21:59;  Status DC


Cefepime HCl (Maxipime) 2 gm Q12HR IVP  Last administered on 4/7/20at 20:56;  

Start 3/25/20 at 09:00;  Stop 4/8/20 at 09:58;  Status DC


Daptomycin 500 mg/ Sodium Chloride 50 ml @  100 mls/hr Q48H IV  Last 

administered on 4/10/20at 09:57;  Start 3/25/20 at 08:30;  Stop 4/10/20 at 

10:07;  Status DC


Lidocaine HCl (Buffered Lidocaine 1%) 3 ml 1X  ONCE INJ  Last administered on 

3/25/20at 10:27;  Start 3/25/20 at 10:30;  Stop 3/25/20 at 10:31;  Status DC


Potassium Phosphate 20 mmol/ Sodium Chloride 106.6667 ml @  51.667 m... 1X  ONCE

IV  Last administered on 3/25/20at 12:51;  Start 3/25/20 at 13:00;  Stop 3/25/20

at 15:03;  Status DC


Sodium Chloride 90 meq/Potassium Chloride 15 meq/ Potassium Phosphate 18 mmol/ 

Magnesium Sulfate 8 meq/Calcium Gluconate 15 meq/ Multivitamins 10 ml/Chromium/ 

Copper/Manganese/ Seleni/Zn 0.5 ml/ Total Parenteral Nutrition/Amino 

Acids/Dextrose/ Fat Emulsion Intravenous 1,400 ml @  58.333 mls/ hr TPN  CONT IV

 Last administered on 3/25/20at 22:16;  Start 3/25/20 at 22:00;  Stop 3/26/20 at

21:59;  Status DC


Potassium Chloride 20 meq/ Bicarbonate Dialysis Soln w/ out KCl 5,010 ml @  

1,000 mls/hr Q5H1M IV  Last administered on 3/29/20at 14:54;  Start 3/25/20 at 

16:00;  Stop 3/29/20 at 19:59;  Status DC


Multi-Ingred Cream/Lotion/Oil/ Oint (Artificial Tears Eye Ointment) 1 thomas PRN 

Q1HR  PRN OU DRY EYE, 2nd choice Last administered on 4/13/20at 08:19;  Start 

3/25/20 at 17:30;  Stop 6/3/20 at 14:39;  Status DC


Sodium Chloride 90 meq/Potassium Chloride 15 meq/ Potassium Phosphate 18 mmol/ 

Magnesium Sulfate 8 meq/Calcium Gluconate 15 meq/ Multivitamins 10 ml/Chromium/ 

Copper/Manganese/ Seleni/Zn 0.5 ml/ Total Parenteral Nutrition/Amino 

Acids/Dextrose/ Fat Emulsion Intravenous 1,400 ml @  58.333 mls/ hr TPN  CONT IV

 Last administered on 3/26/20at 22:00;  Start 3/26/20 at 22:00;  Stop 3/27/20 at

21:59;  Status DC


Albumin Human 500 ml @  125 mls/hr 1X  ONCE IV ;  Start 3/26/20 at 14:15;  Stop 

3/26/20 at 18:14;  Status DC


Sodium Chloride 90 meq/Potassium Chloride 15 meq/ Potassium Phosphate 18 mmol/ 

Magnesium Sulfate 8 meq/Calcium Gluconate 15 meq/ Multivitamins 10 ml/Chromium/ 

Copper/Manganese/ Seleni/Zn 0.5 ml/ Insulin Human Regular 10 unit/ Total 

Parenteral Nutrition/Amino Acids/Dextrose/ Fat Emulsion Intravenous 1,400 ml @  

58.333 mls/ hr TPN  CONT IV  Last administered on 3/27/20at 21:43;  Start 

3/27/20 at 22:00;  Stop 3/28/20 at 21:59;  Status DC


Lidocaine HCl (Buffered Lidocaine 1%) 3 ml STK-MED ONCE .ROUTE ;  Start 3/25/20 

at 10:00;  Stop 3/27/20 at 13:57;  Status DC


Midazolam HCl 100 mg/Sodium Chloride 100 ml @ 7 mls/hr CONT  PRN IV SEE PROTOCOL

Last administered on 4/8/20at 15:35;  Start 3/28/20 at 16:00;  Stop 6/3/20 at 

14:38;  Status DC


Sodium Chloride 90 meq/Potassium Chloride 15 meq/ Potassium Phosphate 18 mmol/ 

Magnesium Sulfate 8 meq/Calcium Gluconate 15 meq/ Multivitamins 10 ml/Chromium/ 

Copper/Manganese/ Seleni/Zn 0.5 ml/ Insulin Human Regular 15 unit/ Total 

Parenteral Nutrition/Amino Acids/Dextrose/ Fat Emulsion Intravenous 1,400 ml @  

58.333 mls/ hr TPN  CONT IV  Last administered on 3/28/20at 20:34;  Start 

3/28/20 at 22:00;  Stop 3/29/20 at 21:59;  Status DC


Info ([facility] Electrolyte Protocol) 1 ea CONT PRN  PRN MC PER PROTOCOL;  Start 

3/29/20 at 13:15


Sodium Chloride 90 meq/Potassium Chloride 15 meq/ Potassium Phosphate 18 mmol/ 

Magnesium Sulfate 8 meq/Calcium Gluconate 15 meq/ Multivitamins 10 ml/Chromium/ 

Copper/Manganese/ Seleni/Zn 0.5 ml/ Insulin Human Regular 15 unit/ Total 

Parenteral Nutrition/Amino Acids/Dextrose/ Fat Emulsion Intravenous 1,400 ml @  

58.333 mls/ hr TPN  CONT IV  Last administered on 3/29/20at 22:05;  Start 

3/29/20 at 22:00;  Stop 3/30/20 at 21:59;  Status DC


Potassium Chloride 15 meq/ Bicarbonate Dialysis Soln w/ out KCl 5,007.5 ml  @ 

1,000 mls/ hr Q5H1M IV  Last administered on 4/1/20at 18:14;  Start 3/29/20 at 

20:00;  Stop 4/2/20 at 13:08;  Status DC


Potassium Chloride 15 meq/ Bicarbonate Dialysis Soln w/ out KCl 5,007.5 ml  @ 

1,000 mls/ hr Q5H1M IV  Last administered on 4/1/20at 18:14;  Start 3/29/20 at 

20:00;  Stop 4/2/20 at 13:08;  Status DC


Potassium Chloride 15 meq/ Bicarbonate Dialysis Soln w/ out KCl 5,007.5 ml  @ 

1,000 mls/ hr Q5H1M IV  Last administered on 4/1/20at 18:14;  Start 3/29/20 at 

20:00;  Stop 4/2/20 at 13:08;  Status DC


Iohexol (Omnipaque 240 Mg/ml) 30 ml 1X  ONCE PO  Last administered on 3/30/20at 

11:30;  Start 3/30/20 at 11:30;  Stop 3/30/20 at 11:33;  Status DC


Info (CONTRAST GIVEN -- Rx MONITORING) 1 each PRN DAILY  PRN MC SEE COMMENTS;  

Start 3/30/20 at 11:45;  Stop 4/1/20 at 11:44;  Status DC


Sodium Chloride 90 meq/Potassium Chloride 15 meq/ Potassium Phosphate 18 mmol/ 

Magnesium Sulfate 8 meq/Calcium Gluconate 15 meq/ Multivitamins 10 ml/Chromium/ 

Copper/Manganese/ Seleni/Zn 0.5 ml/ Insulin Human Regular 15 unit/ Total 

Parenteral Nutrition/Amino Acids/Dextrose/ Fat Emulsion Intravenous 1,400 ml @  

58.333 mls/ hr TPN  CONT IV  Last administered on 3/30/20at 21:47;  Start 

3/30/20 at 22:00;  Stop 3/31/20 at 21:59;  Status DC


Sodium Chloride 90 meq/Potassium Chloride 15 meq/ Potassium Phosphate 18 mmol/ 

Magnesium Sulfate 8 meq/Calcium Gluconate 15 meq/ Multivitamins 10 ml/Chromium/ 

Copper/Manganese/ Seleni/Zn 0.5 ml/ Insulin Human Regular 20 unit/ Total 

Parenteral Nutrition/Amino Acids/Dextrose/ Fat Emulsion Intravenous 1,400 ml @  

58.333 mls/ hr TPN  CONT IV  Last administered on 3/31/20at 21:36;  Start 

3/31/20 at 22:00;  Stop 4/1/20 at 21:59;  Status DC


Alteplase, Recombinant (Cathflo For Central Catheter Clearance) 1 mg 1X  ONCE 

INT CAT  Last administered on 3/31/20at 20:03;  Start 3/31/20 at 19:30;  Stop 

3/31/20 at 19:46;  Status DC


Alteplase, Recombinant (Cathflo For Central Catheter Clearance) 1 mg 1X  ONCE 

INT CAT  Last administered on 3/31/20at 22:05;  Start 3/31/20 at 22:00;  Stop 

3/31/20 at 22:01;  Status DC


Sodium Chloride 90 meq/Potassium Chloride 15 meq/ Potassium Phosphate 18 mmol/ 

Magnesium Sulfate 8 meq/Calcium Gluconate 15 meq/ Multivitamins 10 ml/Chromium/ 

Copper/Manganese/ Seleni/Zn 0.5 ml/ Insulin Human Regular 20 unit/ Total 

Parenteral Nutrition/Amino Acids/Dextrose/ Fat Emulsion Intravenous 1,400 ml @  

58.333 mls/ hr TPN  CONT IV  Last administered on 4/1/20at 21:30;  Start 4/1/20 

at 22:00;  Stop 4/2/20 at 21:59;  Status DC


Dexmedetomidine HCl 400 mcg/ Sodium Chloride 100 ml @ 0 mls/hr CONT  PRN IV 

ANXIETY / AGITATION Last administered on 5/30/20at 12:57;  Start 4/2/20 at 

08:15;  Stop 5/30/20 at 18:31;  Status DC


Sodium Chloride 500 ml @  500 mls/hr 1X PRN  PRN IV ELEVATED BP, SEE COMMENTS;  

Start 4/2/20 at 08:15


Atropine Sulfate (ATROPINE 0.5mg SYRINGE) 0.5 mg PRN Q5MIN  PRN IV SEE COMMENTS;

 Start 4/2/20 at 08:15


Furosemide (Lasix) 20 mg 1X  ONCE IVP  Last administered on 4/2/20at 08:19;  

Start 4/2/20 at 08:15;  Stop 4/2/20 at 08:16;  Status DC


Lidocaine HCl (Buffered Lidocaine 1%) 3 ml STK-MED ONCE .ROUTE ;  Start 4/2/20 

at 08:39;  Stop 4/2/20 at 08:39;  Status DC


Lidocaine HCl (Buffered Lidocaine 1%) 6 ml 1X  ONCE INJ  Last administered on 

4/2/20at 09:05;  Start 4/2/20 at 09:00;  Stop 4/2/20 at 09:06;  Status DC


Sodium Chloride 90 meq/Potassium Chloride 15 meq/ Potassium Phosphate 18 mmol/ 

Magnesium Sulfate 8 meq/Calcium Gluconate 15 meq/ Multivitamins 10 ml/Chromium/ 

Copper/Manganese/ Seleni/Zn 0.5 ml/ Insulin Human Regular 20 unit/ Total 

Parenteral Nutrition/Amino Acids/Dextrose/ Fat Emulsion Intravenous 1,400 ml @  

58.333 mls/ hr TPN  CONT IV  Last administered on 4/2/20at 22:45;  Start 4/2/20 

at 22:00;  Stop 4/3/20 at 21:59;  Status DC


Sodium Chloride 1,000 ml @  1,000 mls/hr Q1H PRN IV hypotension;  Start 4/3/20 

at 07:30;  Stop 4/3/20 at 13:29;  Status DC


Albumin Human 200 ml @  200 mls/hr 1X PRN  PRN IV Hypotension Last administered 

on 4/3/20at 09:36;  Start 4/3/20 at 07:30;  Stop 4/3/20 at 13:29;  Status DC


Sodium Chloride (Normal Saline Flush) 10 ml 1X PRN  PRN IV AP catheter pack;  

Start 4/3/20 at 07:30;  Stop 4/3/20 at 21:29;  Status DC


Sodium Chloride (Normal Saline Flush) 10 ml 1X PRN  PRN IV  catheter pack;  

Start 4/3/20 at 07:30;  Stop 4/4/20 at 07:29;  Status DC


Sodium Chloride 1,000 ml @  400 mls/hr Q2H30M PRN IV PATENCY;  Start 4/3/20 at 

07:30;  Stop 4/3/20 at 19:29;  Status DC


Info (PHARMACY MONITORING -- do not chart) 1 each PRN DAILY  PRN MC SEE 

COMMENTS;  Start 4/3/20 at 07:30;  Stop 4/3/20 at 13:02;  Status DC


Info (PHARMACY MONITORING -- do not chart) 1 each PRN DAILY  PRN MC SEE 

COMMENTS;  Start 4/3/20 at 07:30;  Stop 4/5/20 at 12:45;  Status DC


Sodium Chloride 90 meq/Potassium Chloride 15 meq/ Potassium Phosphate 10 mmol/ 

Magnesium Sulfate 8 meq/Calcium Gluconate 15 meq/ Multivitamins 10 ml/Chromium/ 

Copper/Manganese/ Seleni/Zn 0.5 ml/ Insulin Human Regular 25 unit/ Total Par

enteral Nutrition/Amino Acids/Dextrose/ Fat Emulsion Intravenous 1,400 ml @  

58.333 mls/ hr TPN  CONT IV  Last administered on 4/3/20at 22:19;  Start 4/3/20 

at 22:00;  Stop 4/4/20 at 21:59;  Status DC


Heparin Sodium (Porcine) (Heparin Sodium) 5,000 unit Q12HR SQ  Last administered

on 4/26/20at 08:59;  Start 4/3/20 at 21:00;  Stop 4/26/20 at 10:05;  Status DC


Ondansetron HCl (Zofran) 4 mg PRN Q6HRS  PRN IV NAUSEA/VOMITING;  Start 4/6/20 

at 07:00;  Stop 4/7/20 at 06:59;  Status DC


Fentanyl Citrate (Fentanyl 2ml Vial) 25 mcg PRN Q5MIN  PRN IV MILD PAIN 1-3;  

Start 4/6/20 at 07:00;  Stop 4/7/20 at 06:59;  Status DC


Fentanyl Citrate (Fentanyl 2ml Vial) 50 mcg PRN Q5MIN  PRN IV MODERATE TO SEVERE

PAIN;  Start 4/6/20 at 07:00;  Stop 4/7/20 at 06:59;  Status DC


Ringer's Solution 1,000 ml @  30 mls/hr Q24H IV ;  Start 4/6/20 at 07:00;  Stop 

4/6/20 at 18:59;  Status DC


Lidocaine HCl (Xylocaine-Mpf 1% 2ml Vial) 2 ml PRN 1X  PRN ID PRIOR TO IV START;

 Start 4/6/20 at 07:00;  Stop 4/7/20 at 06:59;  Status DC


Prochlorperazine Edisylate (Compazine) 5 mg PACU PRN  PRN IV NAUSEA, MRX1;  

Start 4/6/20 at 07:00;  Stop 4/7/20 at 06:59;  Status DC


Sodium Chloride 1,000 ml @  1,000 mls/hr Q1H PRN IV hypotension;  Start 4/4/20 

at 09:10;  Stop 4/4/20 at 15:09;  Status DC


Albumin Human 200 ml @  200 mls/hr 1X PRN  PRN IV Hypotension Last administered 

on 4/4/20at 10:10;  Start 4/4/20 at 09:15;  Stop 4/4/20 at 15:14;  Status DC


Sodium Chloride 1,000 ml @  400 mls/hr Q2H30M PRN IV PATENCY;  Start 4/4/20 at 

09:10;  Stop 4/4/20 at 21:09;  Status DC


Info (PHARMACY MONITORING -- do not chart) 1 each PRN DAILY  PRN MC SEE 

COMMENTS;  Start 4/4/20 at 09:15;  Stop 4/5/20 at 12:45;  Status DC


Info (PHARMACY MONITORING -- do not chart) 1 each PRN DAILY  PRN MC SEE 

COMMENTS;  Start 4/4/20 at 09:15;  Stop 4/5/20 at 12:45;  Status DC


Sodium Chloride 90 meq/Potassium Chloride 15 meq/ Potassium Phosphate 10 mmol/ 

Magnesium Sulfate 8 meq/Calcium Gluconate 15 meq/ Multivitamins 10 ml/Chromium/ 

Copper/Manganese/ Seleni/Zn 0.5 ml/ Insulin Human Regular 25 unit/ Total 

Parenteral Nutrition/Amino Acids/Dextrose/ Fat Emulsion Intravenous 1,400 ml @  

58.333 mls/ hr TPN  CONT IV  Last administered on 4/4/20at 22:10;  Start 4/4/20 

at 22:00;  Stop 4/5/20 at 21:59;  Status DC


Magnesium Sulfate 50 ml @ 25 mls/hr PRN DAILY  PRN IV for Mag < 1.7 on am labs 

Last administered on 4/20/20at 17:27;  Start 4/5/20 at 09:15


Sodium Chloride 90 meq/Potassium Chloride 15 meq/ Potassium Phosphate 10 mmol/ 

Magnesium Sulfate 8 meq/Calcium Gluconate 15 meq/ Multivitamins 10 ml/Chromium/ 

Copper/Manganese/ Seleni/Zn 0.5 ml/ Insulin Human Regular 25 unit/ Total 

Parenteral Nutrition/Amino Acids/Dextrose/ Fat Emulsion Intravenous 1,400 ml @  

58.333 mls/ hr TPN  CONT IV  Last administered on 4/5/20at 21:20;  Start 4/5/20 

at 22:00;  Stop 4/6/20 at 21:59;  Status DC


Sodium Chloride 1,000 ml @  1,000 mls/hr Q1H PRN IV hypotension;  Start 4/5/20 

at 12:23;  Stop 4/5/20 at 18:22;  Status DC


Albumin Human 200 ml @  200 mls/hr 1X  ONCE IV  Last administered on 4/5/20at 

13:34;  Start 4/5/20 at 12:30;  Stop 4/5/20 at 13:29;  Status DC


Diphenhydramine HCl (Benadryl) 25 mg 1X PRN  PRN IV ITCHING;  Start 4/5/20 at 

12:30;  Stop 4/6/20 at 12:29;  Status DC


Diphenhydramine HCl (Benadryl) 25 mg 1X PRN  PRN IV ITCHING;  Start 4/5/20 at 

12:30;  Stop 4/6/20 at 12:29;  Status DC


Info (PHARMACY MONITORING -- do not chart) 1 each PRN DAILY  PRN MC SEE 

COMMENTS;  Start 4/5/20 at 12:30;  Status Cancel


Bupivacaine HCl/ Epinephrine Bitart (Sensorcain-Epi 0.5%-1:[id number] Mpf) 30 ml 

STK-MED ONCE .ROUTE  Last administered on 4/6/20at 11:44;  Start 4/6/20 at 

11:00;  Stop 4/6/20 at 11:01;  Status DC


Cellulose (Surgicel Fibrillar 1x2) 1 each STK-MED ONCE .ROUTE ;  Start 4/6/20 at

11:00;  Stop 4/6/20 at 11:01;  Status DC


Sodium Chloride 90 meq/Potassium Chloride 15 meq/ Potassium Phosphate 10 mmol/ 

Magnesium Sulfate 12 meq/Calcium Gluconate 15 meq/ Multivitamins 10 ml/Chromium/

Copper/Manganese/ Seleni/Zn 0.5 ml/ Insulin Human Regular 25 unit/ Total 

Parenteral Nutrition/Amino Acids/Dextrose/ Fat Emulsion Intravenous 1,400 ml @  

58.333 mls/ hr TPN  CONT IV  Last administered on 4/6/20at 22:24;  Start 4/6/20 

at 22:00;  Stop 4/7/20 at 21:59;  Status DC


Propofol 20 ml @ As Directed STK-MED ONCE IV ;  Start 4/6/20 at 11:07;  Stop 

4/6/20 at 11:07;  Status DC


Cellulose (Surgicel Hemostat 4x8) 1 each STK-MED ONCE .ROUTE  Last administered 

on 4/6/20at 11:44;  Start 4/6/20 at 11:55;  Stop 4/6/20 at 11:56;  Status DC


Sevoflurane (Ultane) 60 ml STK-MED ONCE IH ;  Start 4/6/20 at 12:46;  Stop 

4/6/20 at 12:46;  Status DC


Sodium Chloride 1,000 ml @  1,000 mls/hr Q1H PRN IV hypotension;  Start 4/6/20 

at 13:51;  Stop 4/6/20 at 19:50;  Status DC


Albumin Human 200 ml @  200 mls/hr 1X PRN  PRN IV Hypotension Last administered 

on 4/6/20at 14:51;  Start 4/6/20 at 14:00;  Stop 4/6/20 at 19:59;  Status DC


Diphenhydramine HCl (Benadryl) 25 mg 1X PRN  PRN IV ITCHING;  Start 4/6/20 at 

14:00;  Stop 4/7/20 at 13:59;  Status DC


Diphenhydramine HCl (Benadryl) 25 mg 1X PRN  PRN IV ITCHING;  Start 4/6/20 at 

14:00;  Stop 4/7/20 at 13:59;  Status DC


Sodium Chloride 1,000 ml @  400 mls/hr Q2H30M PRN IV PATENCY;  Start 4/6/20 at 

13:51;  Stop 4/7/20 at 01:50;  Status DC


Info (PHARMACY MONITORING -- do not chart) 1 each PRN DAILY  PRN MC SEE 

COMMENTS;  Start 4/6/20 at 14:00;  Stop 4/9/20 at 08:16;  Status DC


Heparin Sodium (Porcine) (Hep Lock Adult) 500 unit STK-MED ONCE IVP ;  Start 

4/7/20 at 09:29;  Stop 4/7/20 at 09:30;  Status DC


Sodium Chloride 1,000 ml @  1,000 mls/hr Q1H PRN IV hypotension;  Start 4/7/20 

at 10:43;  Stop 4/7/20 at 16:42;  Status DC


Sodium Chloride 1,000 ml @  400 mls/hr Q2H30M PRN IV PATENCY;  Start 4/7/20 at 

10:43;  Stop 4/7/20 at 22:42;  Status DC


Info (PHARMACY MONITORING -- do not chart) 1 each PRN DAILY  PRN MC SEE 

COMMENTS;  Start 4/7/20 at 10:45;  Status UNV


Info (PHARMACY MONITORING -- do not chart) 1 each PRN DAILY  PRN MC SEE 

COMMENTS;  Start 4/7/20 at 10:45;  Status UNV


Sodium Chloride 90 meq/Potassium Chloride 15 meq/ Magnesium Sulfate 12 

meq/Calcium Gluconate 15 meq/ Multivitamins 10 ml/Chromium/ Copper/Manganese/ 

Seleni/Zn 0.5 ml/ Insulin Human Regular 25 unit/ Total Parenteral 

Nutrition/Amino Acids/Dextrose/ Fat Emulsion Intravenous 1,400 ml @  58.333 mls/

hr TPN  CONT IV  Last administered on 4/7/20at 22:13;  Start 4/7/20 at 22:00;  

Stop 4/8/20 at 21:59;  Status DC


Sodium Chloride 1,000 ml @  1,000 mls/hr Q1H PRN IV hypotension;  Start 4/8/20 

at 07:50;  Stop 4/8/20 at 13:49;  Status DC


Albumin Human 200 ml @  200 mls/hr 1X  ONCE IV ;  Start 4/8/20 at 08:00;  Stop 

4/8/20 at 08:53;  Status DC


Diphenhydramine HCl (Benadryl) 25 mg 1X PRN  PRN IV ITCHING;  Start 4/8/20 at 

08:00;  Stop 4/9/20 at 07:59;  Status DC


Diphenhydramine HCl (Benadryl) 25 mg 1X PRN  PRN IV ITCHING;  Start 4/8/20 at 

08:00;  Stop 4/9/20 at 07:59;  Status DC


Info (PHARMACY MONITORING -- do not chart) 1 each PRN DAILY  PRN MC SEE TANIKA

OMMENTS;  Start 4/8/20 at 08:00;  Stop 4/9/20 at 08:16;  Status DC


Albumin Human 50 ml @ 50 mls/hr 1X  ONCE IV ;  Start 4/8/20 at 08:53;  Stop 

4/8/20 at 08:56;  Status DC


Albumin Human 200 ml @  50 mls/hr PRN 1X  PRN IV HYPOTENSION Last administered 

on 4/14/20at 11:54;  Start 4/8/20 at 09:00;  Stop 5/21/20 at 11:14;  Status DC


Meropenem 500 mg/ Sodium Chloride 50 ml @  100 mls/hr Q12H IV  Last administered

on 4/28/20at 10:45;  Start 4/8/20 at 10:00;  Stop 4/28/20 at 12:37;  Status DC


Sodium Chloride 90 meq/Magnesium Sulfate 12 meq/ Calcium Gluconate 15 meq/ 

Multivitamins 10 ml/Chromium/ Copper/Manganese/ Seleni/Zn 0.5 ml/ Insulin Human 

Regular 25 unit/ Total Parenteral Nutrition/Amino Acids/Dextrose/ Fat Emulsion 

Intravenous 1,400 ml @  58.333 mls/ hr TPN  CONT IV  Last administered on 

4/8/20at 21:41;  Start 4/8/20 at 22:00;  Stop 4/9/20 at 21:59;  Status DC


Sodium Chloride 1,000 ml @  1,000 mls/hr Q1H PRN IV hypotension;  Start 4/9/20 

at 07:58;  Stop 4/9/20 at 13:57;  Status DC


Albumin Human 200 ml @  200 mls/hr 1X PRN  PRN IV Hypotension Last administered 

on 4/9/20at 09:30;  Start 4/9/20 at 08:00;  Stop 4/9/20 at 13:59;  Status DC


Sodium Chloride 1,000 ml @  400 mls/hr Q2H30M PRN IV PATENCY;  Start 4/9/20 at 

07:58;  Stop 4/9/20 at 19:57;  Status DC


Info (PHARMACY MONITORING -- do not chart) 1 each PRN DAILY  PRN MC SEE 

COMMENTS;  Start 4/9/20 at 08:00;  Status Cancel


Info (PHARMACY MONITORING -- do not chart) 1 each PRN DAILY  PRN MC SEE 

COMMENTS;  Start 4/9/20 at 08:15;  Status UNV


Sodium Chloride 90 meq/Potassium Phosphate 5 mmol/ Magnesium Sulfate 12 

meq/Calcium Gluconate 15 meq/ Multivitamins 10 ml/Chromium/ Copper/Manganese/ 

Seleni/Zn 0.5 ml/ Insulin Human Regular 30 unit/ Total Parenteral 

Nutrition/Amino Acids/Dextrose/ Fat Emulsion Intravenous 1,400 ml @  58.333 mls/

hr TPN  CONT IV  Last administered on 4/9/20at 22:08;  Start 4/9/20 at 22:00;  

Stop 4/10/20 at 21:59;  Status DC


Linezolid/Dextrose 300 ml @  300 mls/hr Q12HR IV  Last administered on 4/20/20at

20:40;  Start 4/10/20 at 11:00;  Stop 4/21/20 at 08:10;  Status DC


Sodium Chloride 90 meq/Potassium Phosphate 15 mmol/ Magnesium Sulfate 12 

meq/Calcium Gluconate 15 meq/ Multivitamins 10 ml/Chromium/ Copper/Manganese/ 

Seleni/Zn 0.5 ml/ Insulin Human Regular 30 unit/ Total Parenteral 

Nutrition/Amino Acids/Dextrose/ Fat Emulsion Intravenous 1,400 ml @  58.333 mls/

hr TPN  CONT IV  Last administered on 4/10/20at 21:49;  Start 4/10/20 at 22:00; 

Stop 4/11/20 at 21:59;  Status DC


Sodium Chloride 90 meq/Potassium Phosphate 15 mmol/ Magnesium Sulfate 12 meq/Ca

lcium Gluconate 15 meq/ Multivitamins 10 ml/Chromium/ Copper/Manganese/ 

Seleni/Zn 0.5 ml/ Insulin Human Regular 40 unit/ Total Parenteral 

Nutrition/Amino Acids/Dextrose/ Fat Emulsion Intravenous 1,400 ml @  58.333 mls/

hr TPN  CONT IV  Last administered on 4/11/20at 21:21;  Start 4/11/20 at 22:00; 

Stop 4/12/20 at 21:59;  Status DC


Sodium Chloride 1,000 ml @  1,000 mls/hr Q1H PRN IV hypotension;  Start 4/11/20 

at 13:26;  Stop 4/11/20 at 19:25;  Status DC


Albumin Human 200 ml @  200 mls/hr 1X PRN  PRN IV Hypotension Last administered 

on 4/11/20at 15:00;  Start 4/11/20 at 13:30;  Stop 4/11/20 at 19:29;  Status DC


Sodium Chloride (Normal Saline Flush) 10 ml 1X PRN  PRN IV AP catheter pack;  

Start 4/11/20 at 13:30;  Stop 4/12/20 at 13:29;  Status DC


Sodium Chloride (Normal Saline Flush) 10 ml 1X PRN  PRN IV  catheter pack;  

Start 4/11/20 at 13:30;  Stop 4/12/20 at 13:29;  Status DC


Sodium Chloride 1,000 ml @  400 mls/hr Q2H30M PRN IV PATENCY;  Start 4/11/20 at 

13:26;  Stop 4/12/20 at 01:25;  Status DC


Info (PHARMACY MONITORING -- do not chart) 1 each PRN DAILY  PRN MC SEE 

COMMENTS;  Start 4/11/20 at 13:30;  Stop 4/11/20 at 13:33;  Status DC


Info (PHARMACY MONITORING -- do not chart) 1 each PRN DAILY  PRN MC SEE 

COMMENTS;  Start 4/11/20 at 13:30;  Stop 4/11/20 at 13:34;  Status DC


Sodium Chloride 90 meq/Potassium Phosphate 19 mmol/ Magnesium Sulfate 12 

meq/Calcium Gluconate 15 meq/ Multivitamins 10 ml/Chromium/ Copper/Manganese/ 

Seleni/Zn 0.5 ml/ Insulin Human Regular 40 unit/ Total Parenteral 

Nutrition/Amino Acids/Dextrose/ Fat Emulsion Intravenous 1,400 ml @  58.333 mls/

hr TPN  CONT IV  Last administered on 4/12/20at 21:54;  Start 4/12/20 at 22:00; 

Stop 4/13/20 at 21:59;  Status DC


Sodium Chloride 1,000 ml @  1,000 mls/hr Q1H PRN IV hypotension;  Start 4/13/20 

at 09:35;  Stop 4/13/20 at 15:34;  Status DC


Albumin Human 200 ml @  200 mls/hr 1X PRN  PRN IV Hypotension;  Start 4/13/20 at

09:45;  Stop 4/13/20 at 15:44;  Status DC


Diphenhydramine HCl (Benadryl) 25 mg 1X PRN  PRN IV ITCHING;  Start 4/13/20 at 

09:45;  Stop 4/14/20 at 09:44;  Status DC


Diphenhydramine HCl (Benadryl) 25 mg 1X PRN  PRN IV ITCHING;  Start 4/13/20 at 

09:45;  Stop 4/14/20 at 09:44;  Status DC


Sodium Chloride 1,000 ml @  400 mls/hr Q2H30M PRN IV PATENCY;  Start 4/13/20 at 

09:35;  Stop 4/13/20 at 21:34;  Status DC


Info (PHARMACY MONITORING -- do not chart) 1 each PRN DAILY  PRN MC SEE 

COMMENTS;  Start 4/13/20 at 09:45;  Status Cancel


Sodium Chloride 100 meq/Potassium Phosphate 19 mmol/ Magnesium Sulfate 12 

meq/Calcium Gluconate 15 meq/ Multivitamins 10 ml/Chromium/ Copper/Manganese/ 

Seleni/Zn 0.5 ml/ Insulin Human Regular 40 unit/ Potassium Chloride 20 meq/ 

Total Parenteral Nutrition/Amino Acids/Dextrose/ Fat Emulsion Intravenous 1,400 

ml @  58.333 mls/ hr TPN  CONT IV  Last administered on 4/13/20at 22:02;  Start 

4/13/20 at 22:00;  Stop 4/14/20 at 21:59;  Status DC


Furosemide (Lasix) 40 mg 1X  ONCE IVP  Last administered on 4/13/20at 14:39;  

Start 4/13/20 at 14:30;  Stop 4/13/20 at 14:31;  Status DC


Metronidazole 100 ml @  100 mls/hr Q8HRS IV  Last administered on 4/21/20at 

06:04;  Start 4/14/20 at 10:00;  Stop 4/21/20 at 08:10;  Status DC


Sodium Chloride 1,000 ml @  1,000 mls/hr Q1H PRN IV hypotension;  Start 4/14/20 

at 08:00;  Stop 4/14/20 at 13:59;  Status DC


Albumin Human 200 ml @  200 mls/hr 1X PRN  PRN IV Hypotension;  Start 4/14/20 at

08:00;  Stop 4/14/20 at 13:59;  Status DC


Sodium Chloride 1,000 ml @  400 mls/hr Q2H30M PRN IV PATENCY;  Start 4/14/20 at 

08:00;  Stop 4/14/20 at 19:59;  Status DC


Info (PHARMACY MONITORING -- do not chart) 1 each PRN DAILY  PRN MC SEE 

COMMENTS;  Start 4/14/20 at 11:30;  Status UNV


Info (PHARMACY MONITORING -- do not chart) 1 each PRN DAILY  PRN MC SEE 

COMMENTS;  Start 4/14/20 at 11:30;  Stop 4/16/20 at 12:13;  Status DC


Sodium Chloride 100 meq/Potassium Phosphate 19 mmol/ Magnesium Sulfate 12 

meq/Calcium Gluconate 15 meq/ Multivitamins 10 ml/Chromium/ Copper/Manganese/ 

Seleni/Zn 0.5 ml/ Insulin Human Regular 40 unit/ Potassium Chloride 20 meq/ 

Total Parenteral Nutrition/Amino Acids/Dextrose/ Fat Emulsion Intravenous 1,400 

ml @  58.333 mls/ hr TPN  CONT IV  Last administered on 4/14/20at 21:52;  Start 

4/14/20 at 22:00;  Stop 4/15/20 at 21:59;  Status DC


Sodium Chloride (Normal Saline Flush) 10 ml QSHIFT  PRN IV AFTER MEDS AND BLOOD 

DRAWS;  Start 4/14/20 at 15:00;  Stop 5/12/20 at 11:27;  Status DC


Sodium Chloride (Normal Saline Flush) 10 ml PRN Q5MIN  PRN IV AFTER MEDS AND 

BLOOD DRAWS;  Start 4/14/20 at 15:00


Sodium Chloride (Normal Saline Flush) 20 ml PRN Q5MIN  PRN IV AFTER MEDS AND 

BLOOD DRAWS;  Start 4/14/20 at 15:00


Sodium Chloride 100 meq/Potassium Phosphate 19 mmol/ Magnesium Sulfate 12 

meq/Calcium Gluconate 15 meq/ Multivitamins 10 ml/Chromium/ Copper/Manganese/ 

Seleni/Zn 0.5 ml/ Insulin Human Regular 40 unit/ Potassium Chloride 20 meq/ 

Total Parenteral Nutrition/Amino Acids/Dextrose/ Fat Emulsion Intravenous 1,400 

ml @  58.333 mls/ hr TPN  CONT IV  Last administered on 4/15/20at 21:20;  Start 

4/15/20 at 22:00;  Stop 4/16/20 at 21:59;  Status DC


Lidocaine HCl (Buffered Lidocaine 1%) 3 ml STK-MED ONCE .ROUTE ;  Start 4/15/20 

at 13:16;  Stop 4/15/20 at 13:16;  Status DC


Lidocaine HCl (Buffered Lidocaine 1%) 6 ml 1X  ONCE INJ  Last administered on 

4/15/20at 13:45;  Start 4/15/20 at 13:30;  Stop 4/15/20 at 13:31;  Status DC


Albumin Human 100 ml @  100 mls/hr 1X  ONCE IV  Last administered on 4/15/20at 

15:41;  Start 4/15/20 at 15:00;  Stop 4/15/20 at 15:59;  Status DC


Albumin Human 50 ml @ 50 mls/hr 1X  ONCE IV  Last administered on 4/15/20at 

15:00;  Start 4/15/20 at 15:00;  Stop 4/15/20 at 15:59;  Status DC


Info (PHARMACY MONITORING -- do not chart) 1 each PRN DAILY  PRN MC SEE 

COMMENTS;  Start 4/16/20 at 11:30;  Status Cancel


Info (PHARMACY MONITORING -- do not chart) 1 each PRN DAILY  PRN MC SEE 

COMMENTS;  Start 4/16/20 at 11:30;  Status UNV


Sodium Chloride 100 meq/Potassium Phosphate 10 mmol/ Magnesium Sulfate 12 

meq/Calcium Gluconate 15 meq/ Multivitamins 10 ml/Chromium/ Copper/Manganese/ 

Seleni/Zn 0.5 ml/ Insulin Human Regular 35 unit/ Potassium Chloride 20 meq/ 

Total Parenteral Nutrition/Amino Acids/Dextrose/ Fat Emulsion Intravenous 1,400 

ml @  58.333 mls/ hr TPN  CONT IV  Last administered on 4/16/20at 22:10;  Start 

4/16/20 at 22:00;  Stop 4/17/20 at 21:59;  Status DC


Sodium Chloride 100 meq/Potassium Phosphate 5 mmol/ Magnesium Sulfate 12 

meq/Calcium Gluconate 15 meq/ Multivitamins 10 ml/Chromium/ Copper/Manganese/ 

Seleni/Zn 0.5 ml/ Insulin Human Regular 35 unit/ Potassium Chloride 20 meq/ 

Total Parenteral Nutrition/Amino Acids/Dextrose/ Fat Emulsion Intravenous 1,400 

ml @  58.333 mls/ hr TPN  CONT IV  Last administered on 4/17/20at 22:59;  Start 

4/17/20 at 22:00;  Stop 4/18/20 at 21:59;  Status DC


Sodium Chloride 1,000 ml @  1,000 mls/hr Q1H PRN IV hypotension;  Start 4/18/20 

at 08:27;  Stop 4/18/20 at 14:26;  Status DC


Albumin Human 200 ml @  200 mls/hr 1X PRN  PRN IV Hypotension Last administered 

on 4/18/20at 09:18;  Start 4/18/20 at 08:30;  Stop 4/18/20 at 14:29;  Status DC


Sodium Chloride 1,000 ml @  400 mls/hr Q2H30M PRN IV PATENCY;  Start 4/18/20 at 

08:27;  Stop 4/18/20 at 20:26;  Status DC


Info (PHARMACY MONITORING -- do not chart) 1 each PRN DAILY  PRN MC SEE 

COMMENTS;  Start 4/18/20 at 08:30;  Status Cancel


Info (PHARMACY MONITORING -- do not chart) 1 each PRN DAILY  PRN MC SEE 

COMMENTS;  Start 4/18/20 at 08:30;  Stop 4/26/20 at 13:10;  Status DC


Sodium Chloride 100 meq/Potassium Chloride 40 meq/ Magnesium Sulfate 15 

meq/Calcium Gluconate 15 meq/ Multivitamins 10 ml/Chromium/ Copper/Manganese/ 

Seleni/Zn 0.5 ml/ Insulin Human Regular 35 unit/ Total Parenteral 

Nutrition/Amino Acids/Dextrose/ Fat Emulsion Intravenous 1,400 ml @  58.333 mls/

hr TPN  CONT IV  Last administered on 4/18/20at 22:00;  Start 4/18/20 at 22:00; 

Stop 4/19/20 at 21:59;  Status DC


Potassium Chloride/Water 100 ml @  100 mls/hr 1X  ONCE IV  Last administered on 

4/18/20at 17:28;  Start 4/18/20 at 14:45;  Stop 4/18/20 at 15:44;  Status DC


Sodium Chloride 100 meq/Potassium Chloride 40 meq/ Magnesium Sulfate 15 meq/Ca

lcium Gluconate 15 meq/ Multivitamins 10 ml/Chromium/ Copper/Manganese/ 

Seleni/Zn 0.5 ml/ Insulin Human Regular 35 unit/ Total Parenteral 

Nutrition/Amino Acids/Dextrose/ Fat Emulsion Intravenous 1,400 ml @  58.333 mls/

hr TPN  CONT IV  Last administered on 4/19/20at 22:46;  Start 4/19/20 at 22:00; 

Stop 4/20/20 at 21:59;  Status DC


Sodium Chloride 100 meq/Potassium Chloride 40 meq/ Magnesium Sulfate 20 

meq/Calcium Gluconate 15 meq/ Multivitamins 10 ml/Chromium/ Copper/Manganese/ 

Seleni/Zn 0.5 ml/ Insulin Human Regular 35 unit/ Total Parenteral 

Nutrition/Amino Acids/Dextrose/ Fat Emulsion Intravenous 1,400 ml @  58.333 mls/

hr TPN  CONT IV  Last administered on 4/20/20at 22:31;  Start 4/20/20 at 22:00; 

Stop 4/21/20 at 21:59;  Status DC


Fentanyl Citrate (Fentanyl 2ml Vial) 50 mcg PRN Q2HR  PRN IVP PAIN Last 

administered on 4/27/20at 13:32;  Start 4/20/20 at 21:00;  Stop 4/28/20 at 12:

53;  Status DC


Fentanyl Citrate (Fentanyl 2ml Vial) 25 mcg PRN Q2HR  PRN IVP PAIN;  Start 

4/20/20 at 21:00;  Stop 4/28/20 at 12:54;  Status DC


Enoxaparin Sodium (Lovenox 100mg Syringe) 100 mg Q12HR SQ ;  Start 4/21/20 at 

21:00;  Status UNV


Amino Acids/ Glycerin/ Electrolytes 1,000 ml @  75 mls/hr [id number] IV ;  Start 

4/20/20 at 21:15;  Status UNV


Sodium Chloride 1,000 ml @  1,000 mls/hr Q1H PRN IV hypotension;  Start 4/21/20 

at 07:56;  Stop 4/21/20 at 13:55;  Status DC


Albumin Human 200 ml @  200 mls/hr 1X PRN  PRN IV Hypotension Last administered 

on 4/21/20at 08:40;  Start 4/21/20 at 08:00;  Stop 4/21/20 at 13:59;  Status DC


Sodium Chloride 1,000 ml @  400 mls/hr Q2H30M PRN IV PATENCY;  Start 4/21/20 at 

07:56;  Stop 4/21/20 at 19:55;  Status DC


Info (PHARMACY MONITORING -- do not chart) 1 each PRN DAILY  PRN MC SEE 

COMMENTS;  Start 4/21/20 at 08:00;  Status UNV


Info (PHARMACY MONITORING -- do not chart) 1 each PRN DAILY  PRN MC SEE 

COMMENTS;  Start 4/21/20 at 08:00;  Status UNV


Daptomycin 430 mg/ Sodium Chloride 50 ml @  100 mls/hr Q24H IV  Last 

administered on 4/21/20at 12:35;  Start 4/21/20 at 09:00;  Stop 4/21/20 at 

12:49;  Status DC


Sodium Chloride 100 meq/Potassium Chloride 40 meq/ Magnesium Sulfate 20 

meq/Calcium Gluconate 15 meq/ Multivitamins 10 ml/Chromium/ Copper/Manganese/ 

Seleni/Zn 0.5 ml/ Insulin Human Regular 35 unit/ Total Parenteral 

Nutrition/Amino Acids/Dextrose/ Fat Emulsion Intravenous 1,400 ml @  58.333 mls/

hr TPN  CONT IV  Last administered on 4/21/20at 21:26;  Start 4/21/20 at 22:00; 

Stop 4/22/20 at 21:59;  Status DC


Daptomycin 430 mg/ Sodium Chloride 50 ml @  100 mls/hr Q48H IV ;  Start 4/23/20 

at 09:00;  Stop 4/22/20 at 11:55;  Status DC


Sodium Chloride 100 meq/Potassium Chloride 40 meq/ Magnesium Sulfate 20 

meq/Calcium Gluconate 15 meq/ Multivitamins 10 ml/Chromium/ Copper/Manganese/ 

Seleni/Zn 0.5 ml/ Insulin Human Regular 35 unit/ Total Parenteral Nutrition/Am

yas Acids/Dextrose/ Fat Emulsion Intravenous 1,400 ml @  58.333 mls/ hr TPN  

CONT IV  Last administered on 4/22/20at 22:27;  Start 4/22/20 at 22:00;  Stop 

4/23/20 at 21:59;  Status DC


Daptomycin 430 mg/ Sodium Chloride 50 ml @  100 mls/hr Q24H IV  Last adm

inistered on 4/24/20at 15:07;  Start 4/22/20 at 13:00;  Stop 4/25/20 at 13:15;  

Status DC


Sodium Chloride 100 meq/Potassium Chloride 40 meq/ Magnesium Sulfate 20 

meq/Calcium Gluconate 10 meq/ Multivitamins 10 ml/Chromium/ Copper/Manganese/ 

Seleni/Zn 0.5 ml/ Insulin Human Regular 35 unit/ Total Parenteral 

Nutrition/Amino Acids/Dextrose/ Fat Emulsion Intravenous 1,400 ml @  58.333 mls/

hr TPN  CONT IV  Last administered on 4/24/20at 00:06;  Start 4/23/20 at 22:00; 

Stop 4/24/20 at 21:59;  Status DC


Alteplase, Recombinant (Cathflo For Central Catheter Clearance) 1 mg 1X  ONCE 

INT CAT  Last administered on 4/24/20at 11:44;  Start 4/24/20 at 10:45;  Stop 

4/24/20 at 10:46;  Status DC


Ondansetron HCl (Zofran) 4 mg PRN Q6HRS  PRN IV NAUSEA/VOMITING;  Start 4/27/20 

at 07:00;  Stop 4/28/20 at 06:59;  Status DC


Fentanyl Citrate (Fentanyl 2ml Vial) 25 mcg PRN Q5MIN  PRN IV MILD PAIN 1-3;  

Start 4/27/20 at 07:00;  Stop 4/28/20 at 06:59;  Status DC


Fentanyl Citrate (Fentanyl 2ml Vial) 50 mcg PRN Q5MIN  PRN IV MODERATE TO SEVERE

PAIN Last administered on 4/27/20at 10:17;  Start 4/27/20 at 07:00;  Stop 

4/28/20 at 06:59;  Status DC


Ringer's Solution 1,000 ml @  30 mls/hr Q24H IV ;  Start 4/27/20 at 07:00;  Stop

4/27/20 at 18:59;  Status DC


Lidocaine HCl (Xylocaine-Mpf 1% 2ml Vial) 2 ml PRN 1X  PRN ID PRIOR TO IV START;

 Start 4/27/20 at 07:00;  Stop 4/28/20 at 06:59;  Status DC


Prochlorperazine Edisylate (Compazine) 5 mg PACU PRN  PRN IV NAUSEA, MRX1;  

Start 4/27/20 at 07:00;  Stop 4/28/20 at 06:59;  Status DC


Sodium Acetate 50 meq/Potassium Acetate 55 meq/ Magnesium Sulfate 20 meq/Calcium

Gluconate 10 meq/ Multivitamins 10 ml/Chromium/ Copper/Manganese/ Seleni/Zn 0.5 

ml/ Insulin Human Regular 35 unit/ Total Parenteral Nutrition/Amino 

Acids/Dextrose/ Fat Emulsion Intravenous 1,400 ml @  58.333 mls/ hr TPN  CONT IV

;  Start 4/24/20 at 22:00;  Stop 4/24/20 at 14:15;  Status DC


Sodium Acetate 50 meq/Potassium Acetate 55 meq/ Magnesium Sulfate 20 meq/Calcium

Gluconate 10 meq/ Multivitamins 10 ml/Chromium/ Copper/Manganese/ Seleni/Zn 0.5 

ml/ Insulin Human Regular 35 unit/ Total Parenteral Nutrition/Amino 

Acids/Dextrose/ Fat Emulsion Intravenous 1,800 ml @  75 mls/hr TPN  CONT IV  

Last administered on 4/24/20at 22:38;  Start 4/24/20 at 22:00;  Stop 4/25/20 at 

21:59;  Status DC


Sodium Chloride 1,000 ml @  1,000 mls/hr Q1H PRN IV hypotension;  Start 4/24/20 

at 15:31;  Stop 4/24/20 at 21:30;  Status DC


Diphenhydramine HCl (Benadryl) 25 mg 1X PRN  PRN IV ITCHING;  Start 4/24/20 at 

15:45;  Stop 4/25/20 at 15:44;  Status DC


Diphenhydramine HCl (Benadryl) 25 mg 1X PRN  PRN IV ITCHING;  Start 4/24/20 at 

15:45;  Stop 4/25/20 at 15:44;  Status DC


Sodium Chloride 1,000 ml @  400 mls/hr Q2H30M PRN IV PATENCY;  Start 4/24/20 at 

15:31;  Stop 4/25/20 at 03:30;  Status DC


Info (PHARMACY MONITORING -- do not chart) 1 each PRN DAILY  PRN MC SEE 

COMMENTS;  Start 4/24/20 at 15:45;  Stop 5/26/20 at 14:14;  Status DC


Sodium Acetate 50 meq/Potassium Acetate 55 meq/ Magnesium Sulfate 20 meq/Calcium

Gluconate 10 meq/ Multivitamins 10 ml/Chromium/ Copper/Manganese/ Seleni/Zn 0.5 

ml/ Insulin Human Regular 35 unit/ Total Parenteral Nutrition/Amino 

Acids/Dextrose/ Fat Emulsion Intravenous 1,800 ml @  75 mls/hr TPN  CONT IV  

Last administered on 4/25/20at 22:03;  Start 4/25/20 at 22:00;  Stop 4/26/20 at 

21:59;  Status DC


Daptomycin 430 mg/ Sodium Chloride 50 ml @  100 mls/hr Q24H IV  Last 

administered on 4/30/20at 13:00;  Start 4/25/20 at 13:00;  Stop 4/30/20 at 

20:58;  Status DC


Heparin Sodium (Porcine) 1000 unit/Sodium Chloride 1,001 ml @  1,001 mls/hr 1X  

ONCE IRR ;  Start 4/27/20 at 06:00;  Stop 4/27/20 at 06:59;  Status DC


Potassium Acetate 55 meq/Magnesium Sulfate 20 meq/ Calcium Gluconate 10 meq/ 

Multivitamins 10 ml/Chromium/ Copper/Manganese/ Seleni/Zn 0.5 ml/ Insulin Human 

Regular 35 unit/ Total Parenteral Nutrition/Amino Acids/Dextrose/ Fat Emulsion 

Intravenous 1,920 ml @  80 mls/hr TPN  CONT IV  Last administered on 4/26/20at 

22:10;  Start 4/26/20 at 22:00;  Stop 4/27/20 at 21:59;  Status DC


Dexamethasone Sodium Phosphate (Decadron) 4 mg STK-MED ONCE .ROUTE ;  Start 

4/27/20 at 10:56;  Stop 4/27/20 at 10:57;  Status DC


Ondansetron HCl (Zofran) 4 mg STK-MED ONCE .ROUTE ;  Start 4/27/20 at 10:56;  

Stop 4/27/20 at 10:57;  Status DC


Rocuronium Bromide (Zemuron) 50 mg STK-MED ONCE .ROUTE ;  Start 4/27/20 at 

10:56;  Stop 4/27/20 at 10:57;  Status DC


Fentanyl Citrate (Fentanyl 2ml Vial) 100 mcg STK-MED ONCE .ROUTE ;  Start 

4/27/20 at 10:56;  Stop 4/27/20 at 10:57;  Status DC


Bupivacaine HCl/ Epinephrine Bitart (Sensorcain-Epi 0.5%-1:313227 Mpf) 30 ml 

STK-MED ONCE .ROUTE  Last administered on 4/27/20at 12:01;  Start 4/27/20 at 

10:58;  Stop 4/27/20 at 10:58;  Status DC


Cellulose (Surgicel Hemostat 2x14) 1 each STK-MED ONCE .ROUTE ;  Start 4/27/20 

at 10:58;  Stop 4/27/20 at 10:59;  Status DC


Iohexol (Omnipaque 300 Mg/ml) 50 ml STK-MED ONCE .ROUTE ;  Start 4/27/20 at 

10:58;  Stop 4/27/20 at 10:59;  Status DC


Cellulose (Surgicel Hemostat 4x8) 1 each STK-MED ONCE .ROUTE ;  Start 4/27/20 at

10:58;  Stop 4/27/20 at 10:59;  Status DC


Bisacodyl (Dulcolax Supp) 10 mg STK-MED ONCE .ROUTE ;  Start 4/27/20 at 10:59;  

Stop 4/27/20 at 10:59;  Status DC


Heparin Sodium (Porcine) 1000 unit/Sodium Chloride 1,001 ml @  1,001 mls/hr 1X  

ONCE IRR ;  Start 4/27/20 at 12:00;  Stop 4/27/20 at 12:59;  Status DC


Propofol 20 ml @ As Directed STK-MED ONCE IV ;  Start 4/27/20 at 11:05;  Stop 

4/27/20 at 11:05;  Status DC


Sevoflurane (Ultane) 90 ml STK-MED ONCE IH ;  Start 4/27/20 at 11:05;  Stop 

4/27/20 at 11:05;  Status DC


Sevoflurane (Ultane) 60 ml STK-MED ONCE IH ;  Start 4/27/20 at 12:26;  Stop 

4/27/20 at 12:27;  Status DC


Propofol 20 ml @ As Directed STK-MED ONCE IV ;  Start 4/27/20 at 12:26;  Stop 

4/27/20 at 12:27;  Status DC


Phenylephrine HCl (PHENYLEPHRINE in 0.9% NACL PF) 1 mg STK-MED ONCE IV ;  Start 

4/27/20 at 12:34;  Stop 4/27/20 at 12:34;  Status DC


Heparin Sodium (Porcine) (Heparin Sodium) 5,000 unit Q12HR SQ  Last administered

on 5/6/20at 20:57;  Start 4/27/20 at 21:00;  Stop 5/7/20 at 09:59;  Status DC


Sodium Chloride (Normal Saline Flush) 3 ml QSHIFT  PRN IV AFTER MEDS AND BLOOD 

DRAWS;  Start 4/27/20 at 13:45


Naloxone HCl (Narcan) 0.4 mg PRN Q2MIN  PRN IV SEE INSTRUCTIONS Last 

administered on 6/6/20at 15:15;  Start 4/27/20 at 13:45


Sodium Chloride 1,000 ml @  25 mls/hr Q24H IV  Last administered on 5/26/20at 

13:37;  Start 4/27/20 at 13:37;  Stop 5/29/20 at 13:09;  Status DC


Naloxone HCl (Narcan) 0.4 mg PRN Q2MIN  PRN IV SEE INSTRUCTIONS;  Start 4/27/20 

at 14:30;  Status UNV


Sodium Chloride 1,000 ml @  25 mls/hr Q24H IV ;  Start 4/27/20 at 14:30;  Status

UNV


Hydromorphone HCl 30 ml @ 0 mls/hr CONT PRN  PRN IV PER PROTOCOL Last 

administered on 5/2/20at 16:08;  Start 4/27/20 at 14:30;  Stop 5/4/20 at 08:55; 

Status DC


Potassium Acetate 55 meq/Magnesium Sulfate 20 meq/ Calcium Gluconate 10 meq/ 

Multivitamins 10 ml/Chromium/ Copper/Manganese/ Seleni/Zn 0.5 ml/ Insulin Human 

Regular 35 unit/ Total Parenteral Nutrition/Amino Acids/Dextrose/ Fat Emulsion 

Intravenous 1,920 ml @  80 mls/hr TPN  CONT IV  Last administered on 4/27/20at 

22:01;  Start 4/27/20 at 22:00;  Stop 4/28/20 at 21:59;  Status DC


Bumetanide (Bumex) 2 mg BID92 IV  Last administered on 5/1/20at 13:50;  Start 

4/28/20 at 14:00;  Stop 5/2/20 at 14:10;  Status DC


Meropenem 1 gm/ Sodium Chloride 100 ml @  200 mls/hr Q8HRS IV  Last administered

on 5/22/20at 05:53;  Start 4/28/20 at 14:00;  Stop 5/22/20 at 09:31;  Status DC


Potassium Acetate 55 meq/Magnesium Sulfate 20 meq/ Calcium Gluconate 10 meq/ 

Multivitamins 10 ml/Chromium/ Copper/Manganese/ Seleni/Zn 0.5 ml/ Insulin Human 

Regular 35 unit/ Total Parenteral Nutrition/Amino Acids/Dextrose/ Fat Emulsion 

Intravenous 1,920 ml @  80 mls/hr TPN  CONT IV  Last administered on 4/28/20at 

22:02;  Start 4/28/20 at 22:00;  Stop 4/29/20 at 21:59;  Status DC


Hydromorphone HCl (Dilaudid Standard PCA) 12 mg STK-MED ONCE IV ;  Start 4/27/20

at 14:35;  Stop 4/28/20 at 13:53;  Status DC


Artificial Tears (Artificial Tears) 1 drop PRN Q15MIN  PRN OU DRY EYE Last 

administered on 5/29/20at 10:08;  Start 4/29/20 at 05:30


Hydromorphone HCl (Dilaudid Standard PCA) 12 mg STK-MED ONCE IV ;  Start 4/28/20

at 12:05;  Stop 4/29/20 at 09:15;  Status DC


Potassium Acetate 65 meq/Magnesium Sulfate 20 meq/ Calcium Gluconate 10 meq/ 

Multivitamins 10 ml/Chromium/ Copper/Manganese/ Seleni/Zn 0.5 ml/ Insulin Human 

Regular 30 unit/ Total Parenteral Nutrition/Amino Acids/Dextrose/ Fat Emulsion 

Intravenous 1,920 ml @  80 mls/hr TPN  CONT IV  Last administered on 4/29/20at 

22:22;  Start 4/29/20 at 22:00;  Stop 4/30/20 at 21:59;  Status DC


Cyclobenzaprine HCl (Flexeril) 10 mg PRN Q6HRS  PRN PO MUSCLE SPASMS;  Start 

4/30/20 at 10:45


Potassium Acetate 55 meq/Magnesium Sulfate 20 meq/ Calcium Gluconate 10 meq/ 

Multivitamins 10 ml/Chromium/ Copper/Manganese/ Seleni/Zn 0.5 ml/ Insulin Human 

Regular 30 unit/ Total Parenteral Nutrition/Amino Acids/Dextrose/ Fat Emulsion 

Intravenous 1,920 ml @  80 mls/hr TPN  CONT IV  Last administered on 5/1/20at 

01:00;  Start 4/30/20 at 22:00;  Stop 5/1/20 at 21:59;  Status DC


Magnesium Sulfate 50 ml @ 25 mls/hr 1X  ONCE IV  Last administered on 4/30/20at 

17:18;  Start 4/30/20 at 12:45;  Stop 4/30/20 at 14:44;  Status DC


Potassium Chloride/Water 100 ml @  100 mls/hr 1X  ONCE IV  Last administered on 

5/1/20at 11:27;  Start 5/1/20 at 12:00;  Stop 5/1/20 at 12:59;  Status DC


Hydromorphone HCl (Dilaudid Standard PCA) 12 mg STK-MED ONCE IV ;  Start 4/29/20

at 10:50;  Stop 5/1/20 at 11:02;  Status DC


Hydromorphone HCl (Dilaudid Standard PCA) 12 mg STK-MED ONCE IV ;  Start 4/30/20

at 13:47;  Stop 5/1/20 at 11:03;  Status DC


Potassium Acetate 30 meq/Magnesium Sulfate 20 meq/ Calcium Gluconate 10 meq/ 

Multivitamins 10 ml/Chromium/ Copper/Manganese/ Seleni/Zn 0.5 ml/ Insulin Human 

Regular 30 unit/ Potassium Chloride 30 meq/ Total Parenteral Nutrition/Amino 

Acids/Dextrose/ Fat Emulsion Intravenous 1,920 ml @  80 mls/hr TPN  CONT IV  

Last administered on 5/1/20at 22:34;  Start 5/1/20 at 22:00;  Stop 5/2/20 at 

21:59;  Status DC


Potassium Chloride/Water 100 ml @  100 mls/hr Q1H IV  Last administered on 5/2/2

0at 13:05;  Start 5/2/20 at 07:00;  Stop 5/2/20 at 10:59;  Status DC


Magnesium Sulfate 50 ml @ 25 mls/hr 1X  ONCE IV  Last administered on 5/2/20at 

10:34;  Start 5/2/20 at 10:30;  Stop 5/2/20 at 12:29;  Status DC


Potassium Chloride 75 meq/ Magnesium Sulfate 20 meq/Calcium Gluconate 10 meq/ 

Multivitamins 10 ml/Chromium/ Copper/Manganese/ Seleni/Zn 0.5 ml/ Insulin Human 

Regular 30 unit/ Total Parenteral Nutrition/Amino Acids/Dextrose/ Fat Emulsion 

Intravenous 1,920 ml @  80 mls/hr TPN  CONT IV  Last administered on 5/2/20at 

21:51;  Start 5/2/20 at 22:00;  Stop 5/3/20 at 22:00;  Status DC


Potassium Chloride 75 meq/ Magnesium Sulfate 20 meq/Calcium Gluconate 10 meq/ 

Multivitamins 10 ml/Chromium/ Copper/Manganese/ Seleni/Zn 0.5 ml/ Insulin Human 

Regular 25 unit/ Total Parenteral Nutrition/Amino Acids/Dextrose/ Fat Emulsion 

Intravenous 1,920 ml @  80 mls/hr TPN  CONT IV  Last administered on 5/3/20at 

22:04;  Start 5/3/20 at 22:00;  Stop 5/4/20 at 21:59;  Status DC


Hydromorphone HCl (Dilaudid) 0.4 mg PRN Q4HRS  PRN IVP PAIN Last administered on

5/4/20at 10:57;  Start 5/4/20 at 09:00;  Stop 5/4/20 at 18:59;  Status DC


Micafungin Sodium 100 mg/Dextrose 100 ml @  100 mls/hr Q24H IV  Last 

administered on 5/26/20at 12:17;  Start 5/4/20 at 11:00;  Stop 5/27/20 at 09:59;

 Status DC


Daptomycin 485 mg/ Sodium Chloride 50 ml @  100 mls/hr Q24H IV  Last 

administered on 5/11/20at 13:10;  Start 5/4/20 at 11:00;  Stop 5/12/20 at 07:44;

 Status DC


Potassium Chloride 75 meq/ Magnesium Sulfate 15 meq/Calcium Gluconate 8 meq/ 

Multivitamins 10 ml/Chromium/ Copper/Manganese/ Seleni/Zn 0.5 ml/ Insulin Human 

Regular 25 unit/ Total Parenteral Nutrition/Amino Acids/Dextrose/ Fat Emulsion 

Intravenous 1,920 ml @  80 mls/hr TPN  CONT IV  Last administered on 5/4/20at 

23:08;  Start 5/4/20 at 22:00;  Stop 5/5/20 at 21:59;  Status DC


Haloperidol Lactate (Haldol Inj) 3 mg 1X  ONCE IVP  Last administered on 

5/4/20at 14:37;  Start 5/4/20 at 14:30;  Stop 5/4/20 at 14:31;  Status DC


Hydromorphone HCl (Dilaudid) 1 mg PRN Q4HRS  PRN IVP PAIN Last administered on 

5/18/20at 06:25;  Start 5/4/20 at 19:00;  Stop 5/18/20 at 17:10;  Status DC


Potassium Chloride 75 meq/ Magnesium Sulfate 15 meq/Calcium Gluconate 8 meq/ 

Multivitamins 10 ml/Chromium/ Copper/Manganese/ Seleni/Zn 0.5 ml/ Insulin Human 

Regular 20 unit/ Total Parenteral Nutrition/Amino Acids/Dextrose/ Fat Emulsion 

Intravenous 1,920 ml @  80 mls/hr TPN  CONT IV  Last administered on 5/5/20at 

22:10;  Start 5/5/20 at 22:00;  Stop 5/6/20 at 21:59;  Status DC


Lidocaine HCl (Buffered Lidocaine 1%) 3 ml STK-MED ONCE .ROUTE ;  Start 5/6/20 

at 11:31;  Stop 5/6/20 at 11:31;  Status DC


Lidocaine HCl (Buffered Lidocaine 1%) 3 ml STK-MED ONCE .ROUTE ;  Start 5/6/20 

at 12:28;  Stop 5/6/20 at 12:29;  Status DC


Lidocaine HCl (Buffered Lidocaine 1%) 6 ml 1X  ONCE INJ  Last administered on 

5/6/20at 12:53;  Start 5/6/20 at 12:45;  Stop 5/6/20 at 12:46;  Status DC


Potassium Chloride 75 meq/ Magnesium Sulfate 15 meq/Calcium Gluconate 8 meq/ 

Multivitamins 10 ml/Chromium/ Copper/Manganese/ Seleni/Zn 0.5 ml/ Insulin Human 

Regular 20 unit/ Total Parenteral Nutrition/Amino Acids/Dextrose/ Fat Emulsion 

Intravenous 1,920 ml @  80 mls/hr TPN  CONT IV  Last administered on 5/6/20at 

22:00;  Start 5/6/20 at 22:00;  Stop 5/7/20 at 21:59;  Status DC


Potassium Chloride 75 meq/ Magnesium Sulfate 15 meq/Calcium Gluconate 8 meq/ 

Multivitamins 10 ml/Chromium/ Copper/Manganese/ Seleni/Zn 0.5 ml/ Insulin Human 

Regular 15 unit/ Total Parenteral Nutrition/Amino Acids/Dextrose/ Fat Emulsion 

Intravenous 1,920 ml @  80 mls/hr TPN  CONT IV  Last administered on 5/7/20at 

22:28;  Start 5/7/20 at 22:00;  Stop 5/8/20 at 21:59;  Status DC


Vecuronium Bromide (Norcuron Bolus) 6 mg PRN Q6HRS  PRN IV VENT ASYNCHRONY;  

Start 5/7/20 at 19:15;  Stop 5/7/20 at 19:35;  Status DC


Bumetanide (Bumex) 2 mg 1X  ONCE IV  Last administered on 5/7/20at 22:09;  Start

5/7/20 at 19:45;  Stop 5/7/20 at 19:46;  Status DC


Lidocaine HCl (Buffered Lidocaine 1%) 3 ml STK-MED ONCE .ROUTE ;  Start 5/8/20 

at 07:59;  Stop 5/8/20 at 07:59;  Status DC


Midazolam HCl (Versed) 5 mg STK-MED ONCE .ROUTE ;  Start 5/8/20 at 08:36;  Stop 

5/8/20 at 08:36;  Status DC


Fentanyl Citrate (Fentanyl 5ml Vial) 250 mcg STK-MED ONCE .ROUTE ;  Start 5/8/20

at 08:36;  Stop 5/8/20 at 08:37;  Status DC


Lidocaine HCl (Buffered Lidocaine 1%) 3 ml 1X  ONCE IJ  Last administered on 

5/8/20at 09:30;  Start 5/8/20 at 09:15;  Stop 5/8/20 at 09:16;  Status DC


Midazolam HCl (Versed) 5 mg 1X  ONCE IV  Last administered on 5/8/20at 09:30;  

Start 5/8/20 at 09:15;  Stop 5/8/20 at 09:16;  Status DC


Fentanyl Citrate (Fentanyl 5ml Vial) 250 mcg 1X  ONCE IV  Last administered on 

5/8/20at 09:30;  Start 5/8/20 at 09:15;  Stop 5/8/20 at 09:16;  Status DC


Bumetanide (Bumex) 2 mg DAILY IV  Last administered on 5/18/20at 08:07;  Start 

5/8/20 at 10:00;  Stop 5/18/20 at 17:15;  Status DC


Potassium Chloride 75 meq/ Magnesium Sulfate 15 meq/ Multivitamins 10 

ml/Chromium/ Copper/Manganese/ Seleni/Zn 0.5 ml/ Insulin Human Regular 15 unit/ 

Total Parenteral Nutrition/Amino Acids/Dextrose/ Fat Emulsion Intravenous 1,920 

ml @  80 mls/hr TPN  CONT IV  Last administered on 5/8/20at 21:59;  Start 5/8/20

at 22:00;  Stop 5/9/20 at 21:59;  Status DC


Metoclopramide HCl (Reglan Vial) 10 mg PRN Q3HRS  PRN IVP NAUSEA/VOMITING-3rd 

choice Last administered on 5/14/20at 04:25;  Start 5/9/20 at 16:45


Potassium Chloride 75 meq/ Magnesium Sulfate 15 meq/ Multivitamins 10 

ml/Chromium/ Copper/Manganese/ Seleni/Zn 0.5 ml/ Insulin Human Regular 15 unit/ 

Total Parenteral Nutrition/Amino Acids/Dextrose/ Fat Emulsion Intravenous 1,920 

ml @  80 mls/hr TPN  CONT IV  Last administered on 5/9/20at 22:41;  Start 5/9/20

at 22:00;  Stop 5/10/20 at 21:59;  Status DC


Magnesium Sulfate 50 ml @ 25 mls/hr 1X  ONCE IV  Last administered on 5/10/20at 

10:44;  Start 5/10/20 at 09:00;  Stop 5/10/20 at 10:59;  Status DC


Potassium Chloride/Water 100 ml @  100 mls/hr 1X  ONCE IV  Last administered on 

5/10/20at 09:37;  Start 5/10/20 at 09:00;  Stop 5/10/20 at 09:59;  Status DC


Duloxetine HCl (Cymbalta) 30 mg DAILY PO  Last administered on 5/11/20at 09:48; 

Start 5/10/20 at 14:00;  Stop 5/13/20 at 10:25;  Status DC


Potassium Chloride 80 meq/ Magnesium Sulfate 20 meq/ Multivitamins 10 

ml/Chromium/ Copper/Manganese/ Seleni/Zn 0.5 ml/ Insulin Human Regular 15 unit/ 

Total Parenteral Nutrition/Amino Acids/Dextrose/ Fat Emulsion Intravenous 1,920 

ml @  80 mls/hr TPN  CONT IV  Last administered on 5/10/20at 21:42;  Start 

5/10/20 at 22:00;  Stop 5/11/20 at 21:59;  Status DC


Potassium Chloride 80 meq/ Magnesium Sulfate 20 meq/ Multivitamins 10 

ml/Chromium/ Copper/Manganese/ Seleni/Zn 0.5 ml/ Insulin Human Regular 15 unit/ 

Total Parenteral Nutrition/Amino Acids/Dextrose/ Fat Emulsion Intravenous 1,920 

ml @  80 mls/hr TPN  CONT IV  Last administered on 5/11/20at 22:20;  Start 

5/11/20 at 22:00;  Stop 5/12/20 at 21:59;  Status DC


Lidocaine HCl (Buffered Lidocaine 1%) 3 ml STK-MED ONCE .ROUTE ;  Start 5/12/20 

at 09:54;  Stop 5/12/20 at 09:55;  Status DC


Hydromorphone HCl (Dilaudid Standard PCA) 12 mg STK-MED ONCE IV ;  Start 5/1/20 

at 15:50;  Stop 5/12/20 at 11:24;  Status DC


Potassium Chloride 80 meq/ Magnesium Sulfate 20 meq/ Multivitamins 10 

ml/Chromium/ Copper/Manganese/ Seleni/Zn 0.5 ml/ Insulin Human Regular 15 unit/ 

Total Parenteral Nutrition/Amino Acids/Dextrose/ Fat Emulsion Intravenous 1,920 

ml @  80 mls/hr TPN  CONT IV  Last administered on 5/12/20at 21:40;  Start 

5/12/20 at 22:00;  Stop 5/13/20 at 21:59;  Status DC


Lidocaine HCl (Buffered Lidocaine 1%) 6 ml 1X  ONCE INJ  Last administered on 

5/12/20at 14:15;  Start 5/12/20 at 14:15;  Stop 5/12/20 at 14:16;  Status DC


Potassium Chloride 80 meq/ Magnesium Sulfate 20 meq/ Multivitamins 10 

ml/Chromium/ Copper/Manganese/ Seleni/Zn 1 ml/ Insulin Human Regular 15 unit/ 

Total Parenteral Nutrition/Amino Acids/Dextrose/ Fat Emulsion Intravenous 1,920 

ml @  80 mls/hr TPN  CONT IV  Last administered on 5/13/20at 22:04;  Start 

5/13/20 at 22:00;  Stop 5/14/20 at 21:59;  Status DC


Potassium Chloride/Water 100 ml @  100 mls/hr 1X  ONCE IV  Last administered on 

5/14/20at 11:34;  Start 5/14/20 at 11:00;  Stop 5/14/20 at 11:59;  Status DC


Potassium Chloride 90 meq/ Magnesium Sulfate 20 meq/ Multivitamins 10 

ml/Chromium/ Copper/Manganese/ Seleni/Zn 1 ml/ Insulin Human Regular 15 unit/ 

Total Parenteral Nutrition/Amino Acids/Dextrose/ Fat Emulsion Intravenous 1,920 

ml @  80 mls/hr TPN  CONT IV  Last administered on 5/14/20at 22:57;  Start 

5/14/20 at 22:00;  Stop 5/15/20 at 21:59;  Status DC


Potassium Chloride 90 meq/ Magnesium Sulfate 20 meq/ Multivitamins 10 

ml/Chromium/ Copper/Manganese/ Seleni/Zn 1 ml/ Insulin Human Regular 15 unit/ 

Total Parenteral Nutrition/Amino Acids/Dextrose/ Fat Emulsion Intravenous 1,920 

ml @  80 mls/hr TPN  CONT IV  Last administered on 5/15/20at 22:48;  Start 

5/15/20 at 22:00;  Stop 5/16/20 at 21:59;  Status DC


Potassium Chloride 90 meq/ Magnesium Sulfate 20 meq/ Multivitamins 10 

ml/Chromium/ Copper/Manganese/ Seleni/Zn 1 ml/ Insulin Human Regular 15 unit/ 

Total Parenteral Nutrition/Amino Acids/Dextrose/ Fat Emulsion Intravenous 1,890 

ml @  78.75 mls/ hr TPN  CONT IV  Last administered on 5/16/20at 22:15;  Start 

5/16/20 at 22:00;  Stop 5/17/20 at 21:59;  Status DC


Linezolid/Dextrose 300 ml @  300 mls/hr Q12HR IV  Last administered on 5/19/20at

21:08;  Start 5/17/20 at 09:00;  Stop 5/20/20 at 08:11;  Status DC


Daptomycin 450 mg/ Sodium Chloride 50 ml @  100 mls/hr Q24H IV  Last 

administered on 5/20/20at 09:25;  Start 5/17/20 at 09:00;  Stop 5/21/20 at 

08:30;  Status DC


Potassium Chloride 90 meq/ Magnesium Sulfate 20 meq/ Multivitamins 10 

ml/Chromium/ Copper/Manganese/ Seleni/Zn 1 ml/ Insulin Human Regular 15 unit/ 

Total Parenteral Nutrition/Amino Acids/Dextrose/ Fat Emulsion Intravenous 1,890 

ml @  78.75 mls/ hr TPN  CONT IV  Last administered on 5/17/20at 21:34;  Start 

5/17/20 at 22:00;  Stop 5/18/20 at 21:59;  Status DC


Lorazepam (Ativan Inj) 2 mg STK-MED ONCE .ROUTE ;  Start 5/17/20 at 14:58;  Stop

5/17/20 at 14:58;  Status DC


Metoprolol Tartrate (Lopressor Vial) 5 mg 1X  ONCE IVP  Last administered on 

5/17/20at 15:31;  Start 5/17/20 at 15:15;  Stop 5/17/20 at 15:16;  Status DC


Lorazepam (Ativan Inj) 2 mg 1X  ONCE IVP  Last administered on 5/17/20at 15:30; 

Start 5/17/20 at 15:15;  Stop 5/17/20 at 15:16;  Status DC


Enoxaparin Sodium (Lovenox 40mg Syringe) 40 mg Q24H SQ  Last administered on 

6/5/20at 17:44;  Start 5/17/20 at 17:00;  Stop 6/7/20 at 06:50;  Status DC


Lorazepam (Ativan Inj) 1 mg PRN Q4HRS  PRN IVP ANXIETY / AGITATION MILD-MOD Last

administered on 5/31/20at 15:55;  Start 5/17/20 at 19:15;  Stop 6/2/20 at 11:45;

 Status DC


Lorazepam (Ativan Inj) 2 mg PRN Q4HRS  PRN IVP ANXIETY / AGITATION SEVERE Last 

administered on 6/1/20at 07:55;  Start 5/17/20 at 19:15;  Stop 6/2/20 at 11:45; 

Status DC


Fentanyl Citrate (Fentanyl 2ml Vial) 50 mcg PRN Q4HRS  PRN IVP SEVERE PAIN Last 

administered on 6/13/20at 05:15;  Start 5/18/20 at 13:15


Fentanyl Citrate (Fentanyl 2ml Vial) 25 mcg PRN Q4HRS  PRN IVP MODERATE PAIN L

ast administered on 6/13/20at 00:27;  Start 5/18/20 at 13:15


Potassium Chloride 90 meq/ Magnesium Sulfate 20 meq/ Multivitamins 10 

ml/Chromium/ Copper/Manganese/ Seleni/Zn 1 ml/ Insulin Human Regular 15 unit/ 

Total Parenteral Nutrition/Amino Acids/Dextrose/ Fat Emulsion Intravenous 1,890 

ml @  78.75 mls/ hr TPN  CONT IV  Last administered on 5/18/20at 22:18;  Start 

5/18/20 at 22:00;  Stop 5/19/20 at 21:59;  Status DC


Furosemide (Lasix) 40 mg 1X  ONCE IVP  Last administered on 5/18/20at 21:51;  

Start 5/18/20 at 21:45;  Stop 5/18/20 at 21:48;  Status DC


Albumin Human 100 ml @  100 mls/hr 1X PRN  PRN IV SEE COMMENTS;  Start 5/19/20 

at 01:30


Furosemide (Lasix) 40 mg [id number] IVP  Last administered on 6/3/20at 08:04;  Start 

5/19/20 at 14:00;  Stop 6/3/20 at 13:07;  Status DC


Potassium Chloride 90 meq/ Magnesium Sulfate 20 meq/ Multivitamins 10 

ml/Chromium/ Copper/Manganese/ Seleni/Zn 1 ml/ Insulin Human Regular 15 unit/ 

Total Parenteral Nutrition/Amino Acids/Dextrose/ Fat Emulsion Intravenous 1,800 

ml @  75 mls/hr TPN  CONT IV  Last administered on 5/19/20at 22:31;  Start 

5/19/20 at 22:00;  Stop 5/20/20 at 21:59;  Status DC


Potassium Chloride 90 meq/ Magnesium Sulfate 20 meq/ Multivitamins 10 

ml/Chromium/ Copper/Manganese/ Seleni/Zn 1 ml/ Insulin Human Regular 15 unit/ 

Total Parenteral Nutrition/Amino Acids/Dextrose/ Fat Emulsion Intravenous 1,800 

ml @  75 mls/hr TPN  CONT IV  Last administered on 5/20/20at 22:28;  Start 

5/20/20 at 22:00;  Stop 5/21/20 at 21:59;  Status DC


Potassium Chloride 110 meq/ Magnesium Sulfate 20 meq/ Multivitamins 10 

ml/Chromium/ Copper/Manganese/ Seleni/Zn 1 ml/ Insulin Human Regular 15 unit/ 

Total Parenteral Nutrition/Amino Acids/Dextrose/ Fat Emulsion Intravenous 1,800 

ml @  75 mls/hr TPN  CONT IV  Last administered on 5/21/20at 22:01;  Start 

5/21/20 at 22:00;  Stop 5/22/20 at 21:59;  Status DC


Saliva Substitute (Biotene Moisturizing Mouth) 2 spray PRN Q15MIN  PRN PO DRY 

MOUTH;  Start 5/21/20 at 11:00


Potassium Chloride 110 meq/ Magnesium Sulfate 20 meq/ Multivitamins 10 

ml/Chromium/ Copper/Manganese/ Seleni/Zn 1 ml/ Insulin Human Regular 15 unit/ 

Total Parenteral Nutrition/Amino Acids/Dextrose/ Fat Emulsion Intravenous 1,800 

ml @  75 mls/hr TPN  CONT IV  Last administered on 5/22/20at 22:21;  Start 

5/22/20 at 22:00;  Stop 5/23/20 at 21:59;  Status DC


Potassium Chloride 110 meq/ Magnesium Sulfate 20 meq/ Multivitamins 10 

ml/Chromium/ Copper/Manganese/ Seleni/Zn 1 ml/ Insulin Human Regular 15 unit/ 

Total Parenteral Nutrition/Amino Acids/Dextrose/ Fat Emulsion Intravenous 1,800 

ml @  75 mls/hr TPN  CONT IV  Last administered on 5/23/20at 22:04;  Start 

5/23/20 at 22:00;  Stop 5/24/20 at 21:59;  Status DC


Potassium Chloride 110 meq/ Magnesium Sulfate 20 meq/ Multivitamins 10 

ml/Chromium/ Copper/Manganese/ Seleni/Zn 1 ml/ Insulin Human Regular 15 unit/ 

Total Parenteral Nutrition/Amino Acids/Dextrose/ Fat Emulsion Intravenous 1,800 

ml @  75 mls/hr TPN  CONT IV  Last administered on 5/24/20at 22:48;  Start 

5/24/20 at 22:00;  Stop 5/25/20 at 21:59;  Status DC


Potassium Chloride 70 meq/ Magnesium Sulfate 20 meq/ Multivitamins 10 

ml/Chromium/ Copper/Manganese/ Seleni/Zn 1 ml/ Insulin Human Regular 15 unit/ 

Total Parenteral Nutrition/Amino Acids/Dextrose/ Fat Emulsion Intravenous 1,800 

ml @  75 mls/hr TPN  CONT IV  Last administered on 5/25/20at 21:39;  Start 

5/25/20 at 22:00;  Stop 5/26/20 at 21:59;  Status DC


Meropenem 500 mg/ Sodium Chloride 50 ml @  100 mls/hr Q6HRS IV  Last admi

nistered on 5/27/20at 06:02;  Start 5/25/20 at 18:00;  Stop 5/27/20 at 09:59;  

Status DC


Barium Sulfate (Varibar Thin Liquid Apple) 148 gm 1X  ONCE PO ;  Start 5/26/20 

at 11:45;  Stop 5/26/20 at 11:49;  Status DC


Potassium Chloride 70 meq/ Magnesium Sulfate 20 meq/ Multivitamins 10 

ml/Chromium/ Copper/Manganese/ Seleni/Zn 1 ml/ Insulin Human Regular 15 unit/ 

Total Parenteral Nutrition/Amino Acids/Dextrose/ Fat Emulsion Intravenous 1,800 

ml @  75 mls/hr TPN  CONT IV  Last administered on 5/26/20at 22:27;  Start 

5/26/20 at 22:00;  Stop 5/27/20 at 21:59;  Status DC


Piperacillin Sod/ Tazobactam Sod 3.375 gm/Sodium Chloride 50 ml @  100 mls/hr 

Q6HRS IV  Last administered on 6/4/20at 06:10;  Start 5/27/20 at 12:00;  Stop 

6/4/20 at 07:26;  Status DC


Potassium Chloride 70 meq/ Magnesium Sulfate 20 meq/ Multivitamins 10 

ml/Chromium/ Copper/Manganese/ Seleni/Zn 1 ml/ Insulin Human Regular 15 unit/ 

Total Parenteral Nutrition/Amino Acids/Dextrose/ Fat Emulsion Intravenous 1,800 

ml @  75 mls/hr TPN  CONT IV  Last administered on 5/27/20at 22:03;  Start 

5/27/20 at 22:00;  Stop 5/28/20 at 21:59;  Status DC


Potassium Chloride 70 meq/ Magnesium Sulfate 20 meq/ Multivitamins 10 

ml/Chromium/ Copper/Manganese/ Seleni/Zn 1 ml/ Insulin Human Regular 15 unit/ 

Total Parenteral Nutrition/Amino Acids/Dextrose/ Fat Emulsion Intravenous 1,800 

ml @  75 mls/hr TPN  CONT IV  Last administered on 5/28/20at 22:33;  Start 

5/28/20 at 22:00;  Stop 5/29/20 at 21:59;  Status DC


Potassium Chloride 70 meq/ Magnesium Sulfate 20 meq/ Multivitamins 10 m

l/Chromium/ Copper/Manganese/ Seleni/Zn 1 ml/ Insulin Human Regular 15 unit/ 

Total Parenteral Nutrition/Amino Acids/Dextrose/ Fat Emulsion Intravenous 1,800 

ml @  75 mls/hr TPN  CONT IV  Last administered on 5/29/20at 23:13;  Start 

5/29/20 at 22:00;  Stop 5/30/20 at 21:59;  Status DC


Potassium Chloride 80 meq/ Magnesium Sulfate 20 meq/ Multivitamins 10 

ml/Chromium/ Copper/Manganese/ Seleni/Zn 1 ml/ Insulin Human Regular 15 unit/ 

Total Parenteral Nutrition/Amino Acids/Dextrose/ Fat Emulsion Intravenous 1,800 

ml @  75 mls/hr TPN  CONT IV  Last administered on 5/30/20at 22:30;  Start 

5/30/20 at 22:00;  Stop 5/31/20 at 21:59;  Status DC


Potassium Chloride 80 meq/ Magnesium Sulfate 20 meq/ Multivitamins 10 

ml/Chromium/ Copper/Manganese/ Seleni/Zn 1 ml/ Insulin Human Regular 15 unit/ 

Total Parenteral Nutrition/Amino Acids/Dextrose/ Fat Emulsion Intravenous 1,800 

ml @  75 mls/hr TPN  CONT IV  Last administered on 5/31/20at 21:54;  Start 

5/31/20 at 22:00;  Stop 6/1/20 at 21:59;  Status DC


Potassium Chloride/Water 100 ml @  100 mls/hr 1X  ONCE IV  Last administered on 

6/1/20at 10:15;  Start 6/1/20 at 10:00;  Stop 6/1/20 at 10:59;  Status DC


Potassium Chloride 90 meq/ Magnesium Sulfate 20 meq/ Multivitamins 10 

ml/Chromium/ Copper/Manganese/ Seleni/Zn 1 ml/ Insulin Human Regular 20 unit/ 

Total Parenteral Nutrition/Amino Acids/Dextrose/ Fat Emulsion Intravenous 1,800 

ml @  75 mls/hr TPN  CONT IV  Last administered on 6/1/20at 22:28;  Start 6/1/20

at 22:00;  Stop 6/2/20 at 21:59;  Status DC


Potassium Chloride 90 meq/ Magnesium Sulfate 20 meq/ Multivitamins 10 

ml/Chromium/ Copper/Manganese/ Seleni/Zn 1 ml/ Insulin Human Regular 20 unit/ 

Total Parenteral Nutrition/Amino Acids/Dextrose/ Fat Emulsion Intravenous 1,800 

ml @  75 mls/hr TPN  CONT IV  Last administered on 6/2/20at 22:08;  Start 6/2/20

at 22:00;  Stop 6/3/20 at 21:59;  Status DC


Lorazepam (Ativan Inj) 0.25 mg PRN Q4HRS  PRN IVP ANXIETY / AGITATION Last 

administered on 6/13/20at 02:25;  Start 6/3/20 at 07:30


Potassium Chloride 90 meq/ Magnesium Sulfate 20 meq/ Multivitamins 10 

ml/Chromium/ Copper/Manganese/ Seleni/Zn 1 ml/ Insulin Human Regular 20 unit/ 

Total Parenteral Nutrition/Amino Acids/Dextrose/ Fat Emulsion Intravenous 1,800 

ml @  75 mls/hr TPN  CONT IV  Last administered on 6/3/20at 23:13;  Start 6/3/20

at 22:00;  Stop 6/4/20 at 21:59;  Status DC


Furosemide (Lasix) 40 mg DAILY IVP  Last administered on 6/5/20at 11:14;  Start 

6/3/20 at 13:30;  Stop 6/7/20 at 09:12;  Status DC


Fluoxetine HCl (PROzac) 20 mg QHS PEG  Last administered on 6/12/20at 20:34;  

Start 6/4/20 at 21:00


Fentanyl (Duragesic 50mcg/ Hr Patch) 1 patch Q72H TD  Last administered on 

6/4/20at 21:22;  Start 6/4/20 at 21:00


Potassium Chloride 40 meq/ Potassium Acetate 60 meq/Magnesium Sulfate 10 meq/ 

Multivitamins 10 ml/Chromium/ Copper/Manganese/ Seleni/Zn 1 ml/ Insulin Human 

Regular 20 unit/ Total Parenteral Nutrition/Amino Acids/Dextrose/ Fat Emulsion 

Intravenous 1,800 ml @  75 mls/hr TPN  CONT IV  Last administered on 6/5/20at 

00:03;  Start 6/4/20 at 22:00;  Stop 6/5/20 at 21:59;  Status DC


Potassium Acetate 80 meq/Magnesium Sulfate 5 meq/ Multivitamins 10 ml/Chromium/ 

Copper/Manganese/ Seleni/Zn 1 ml/ Insulin Human Regular 20 unit/ Total 

Parenteral Nutrition/Amino Acids/Dextrose/ Fat Emulsion Intravenous 1,920 ml @  

80 mls/hr TPN  CONT IV  Last administered on 6/5/20at 21:59;  Start 6/5/20 at 

22:00;  Stop 6/6/20 at 21:59;  Status DC


Potassium Acetate 60 meq/Magnesium Sulfate 5 meq/ Multivitamins 10 ml/Chromium/ 

Copper/Manganese/ Seleni/Zn 1 ml/ Insulin Human Regular 30 unit/ Total 

Parenteral Nutrition/Amino Acids/Dextrose/ Fat Emulsion Intravenous 1,920 ml @  

80 mls/hr TPN  CONT IV  Last administered on 6/6/20at 21:54;  Start 6/6/20 at 

22:00;  Stop 6/7/20 at 21:59;  Status DC


Norepinephrine Bitartrate 8 mg/ Dextrose 258 ml @  13.332 mls/ hr CONT  PRN IV 

PER PROTOCOL Last administered on 6/7/20at 21:46;  Start 6/7/20 at 06:30


Albumin Human 500 ml @  125 mls/hr 1X  ONCE IV  Last administered on 6/7/20at 

08:10;  Start 6/7/20 at 08:15;  Stop 6/7/20 at 12:14;  Status DC


Potassium Acetate 40 meq/Magnesium Sulfate 5 meq/ Multivitamins 10 ml/Chromium/ 

Copper/Manganese/ Seleni/Zn 1 ml/ Insulin Human Regular 30 unit/ Total 

Parenteral Nutrition/Amino Acids/Dextrose/ Fat Emulsion Intravenous 1,920 ml @  

80 mls/hr TPN  CONT IV  Last administered on 6/7/20at 22:23;  Start 6/7/20 at 

22:00;  Stop 6/8/20 at 21:59;  Status DC


Meropenem 1 gm/ Sodium Chloride 100 ml @  200 mls/hr Q8HRS IV ;  Start 6/7/20 at

14:00;  Status Cancel


Meropenem 1 gm/ Sodium Chloride 100 ml @  200 mls/hr Q8HRS IV  Last administered

on 6/7/20at 11:04;  Start 6/7/20 at 10:00;  Stop 6/7/20 at 13:00;  Status DC


Meropenem 1 gm/ Sodium Chloride 100 ml @  200 mls/hr Q12HR IV  Last administered

on 6/12/20at 20:35;  Start 6/7/20 at 21:00


Sodium Chloride 1,000 ml @  1,000 mls/hr 1X  ONCE IV  Last administered on 

6/7/20at 11:06;  Start 6/7/20 at 10:45;  Stop 6/7/20 at 11:44;  Status DC


Micafungin Sodium 100 mg/Dextrose 100 ml @  100 mls/hr Q24H IV  Last 

administered on 6/12/20at 10:44;  Start 6/7/20 at 11:00


Daptomycin 410 mg/ Sodium Chloride 50 ml @  100 mls/hr Q24H IV  Last 

administered on 6/9/20at 13:33;  Start 6/7/20 at 14:00;  Stop 6/10/20 at 08:30; 

Status DC


Midazolam HCl (Versed) 2 mg STK-MED ONCE .ROUTE ;  Start 6/7/20 at 14:47;  Stop 

6/7/20 at 14:48;  Status DC


Fentanyl Citrate (Fentanyl 2ml Vial) 100 mcg STK-MED ONCE .ROUTE ;  Start 6/7/20

at 14:47;  Stop 6/7/20 at 14:48;  Status DC


Flumazenil (Romazicon) 0.5 mg STK-MED ONCE IV ;  Start 6/7/20 at 14:48;  Stop 

6/7/20 at 14:48;  Status DC


Naloxone HCl (Narcan) 0.4 mg STK-MED ONCE .ROUTE ;  Start 6/7/20 at 14:48;  Stop

6/7/20 at 14:48;  Status DC


Lidocaine HCl (Lidocaine 1% 20ml Vial) 20 ml STK-MED ONCE .ROUTE ;  Start 6/7/20

at 14:48;  Stop 6/7/20 at 14:48;  Status DC


Midazolam HCl (Versed) 2 mg 1X  ONCE IV  Last administered on 6/7/20at 15:28;  

Start 6/7/20 at 15:00;  Stop 6/7/20 at 15:01;  Status DC


Fentanyl Citrate (Fentanyl 2ml Vial) 100 mcg 1X  ONCE IV  Last administered on 6 /7/20at 15:28;  Start 6/7/20 at 15:00;  Stop 6/7/20 at 15:01;  Status DC


Lidocaine HCl (Lidocaine 1% 20ml Vial) 20 ml 1X  ONCE INJ  Last administered on 

6/7/20at 15:30;  Start 6/7/20 at 15:00;  Stop 6/7/20 at 15:01;  Status DC


Sodium Chloride 1,000 ml @  100 mls/hr Q10H IV  Last administered on 6/13/20at 

00:31;  Start 6/7/20 at 20:00


Sodium Bicarbonate (Sodium Bicarb Adult 8.4% Syr) 50 meq 1X  ONCE IV  Last 

administered on 6/7/20at 21:47;  Start 6/7/20 at 22:00;  Stop 6/7/20 at 22:01;  

Status DC


Potassium Acetate 40 meq/Magnesium Sulfate 5 meq/ Multivitamins 10 ml/Chromium/ 

Copper/Manganese/ Seleni/Zn 1 ml/ Insulin Human Regular 30 unit/ Total 

Parenteral Nutrition/Amino Acids/Dextrose/ Fat Emulsion Intravenous 1,920 ml @  

80 mls/hr TPN  CONT IV  Last administered on 6/8/20at 22:28;  Start 6/8/20 at 

22:00;  Stop 6/9/20 at 21:59;  Status DC


Sodium Chloride 500 ml @  500 mls/hr 1X  ONCE IV  Last administered on 6/9/20at 

06:39;  Start 6/9/20 at 06:45;  Stop 6/9/20 at 07:44;  Status DC


Potassium Acetate 40 meq/Magnesium Sulfate 5 meq/ Multivitamins 10 ml/Chromium/ 

Copper/Manganese/ Seleni/Zn 1 ml/ Insulin Human Regular 30 unit/ Total Paren

teral Nutrition/Amino Acids/Dextrose/ Fat Emulsion Intravenous 1,920 ml @  80 

mls/hr TPN  CONT IV  Last administered on 6/9/20at 22:03;  Start 6/9/20 at 

22:00;  Stop 6/10/20 at 21:59;  Status DC


Metoprolol Tartrate (Lopressor Vial) 5 mg PRN Q6HRS  PRN IVP HYPERTENSION Last 

administered on 6/13/20at 04:03;  Start 6/10/20 at 09:00


Potassium Acetate 40 meq/Magnesium Sulfate 5 meq/ Multivitamins 10 ml/Chromium/ 

Copper/Manganese/ Seleni/Zn 1 ml/ Insulin Human Regular 30 unit/ Total 

Parenteral Nutrition/Amino Acids/Dextrose/ Fat Emulsion Intravenous 1,920 ml @  

80 mls/hr TPN  CONT IV  Last administered on 6/10/20at 21:26;  Start 6/10/20 at 

22:00;  Stop 6/11/20 at 21:59;  Status DC


Potassium Acetate 40 meq/Magnesium Sulfate 5 meq/ Multivitamins 10 ml/Chromium/ 

Copper/Manganese/ Seleni/Zn 1 ml/ Insulin Human Regular 30 unit/ Total 

Parenteral Nutrition/Amino Acids/Dextrose/ Fat Emulsion Intravenous 1,920 ml @  

80 mls/hr TPN  CONT IV  Last administered on 6/11/20at 23:23;  Start 6/11/20 at 

22:00;  Stop 6/12/20 at 21:59;  Status DC


Potassium Acetate 40 meq/Magnesium Sulfate 5 meq/ Multivitamins 10 ml/Chromium/ 

Copper/Manganese/ Seleni/Zn 1 ml/ Insulin Human Regular 30 unit/ Total 

Parenteral Nutrition/Amino Acids/Dextrose/ Fat Emulsion Intravenous 1,920 ml @  

80 mls/hr TPN  CONT IV  Last administered on 6/12/20at 21:35;  Start 6/12/20 at 

22:00;  Stop 6/13/20 at 21:59


Furosemide (Lasix) 20 mg 1X  ONCE IVP  Last administered on 6/13/20at 06:26;  

Start 6/13/20 at 06:15;  Stop 6/13/20 at 06:16;  Status DC


Methylprednisolone Sodium Succinate (SOLU-Medrol 125MG VIAL) 125 mg 1X  ONCE IV 

Last administered on 6/13/20at 06:26;  Start 6/13/20 at 06:15;  Stop 6/13/20 at 

06:16;  Status DC


Albuterol/ Ipratropium (Duoneb) 3 ml Q4HRS NEB ;  Start 6/13/20 at 08:00


Fentanyl Citrate 30 ml @ 0 mls/hr CONT  PRN IV SEE PROTOCOL Last administered on

6/13/20at 06:31;  Start 6/13/20 at 06:00


Propofol 100 ml @ 0 mls/hr CONT  PRN IV SEE PROTOCOL Last administered on 

6/13/20at 06:10;  Start 6/13/20 at 06:00


Fentanyl Citrate (Fentanyl 2ml Vial) 25 mcg PRN Q1HR  PRN IV SEE COMMENTS;  

Start 6/13/20 at 06:00


Fentanyl Citrate (Fentanyl 2ml Vial) 50 mcg PRN Q1HR  PRN IV SEE COMMENTS;  

Start 6/13/20 at 06:00


Chlorhexidine Gluconate (Peridex) 15 ml BID MM ;  Start 6/13/20 at 09:00





Active Scripts


Active


Reported


Bisoprolol Fumarate 5 Mg Tablet 10 Mg PO DAILY


Vitals/I & O





Vital Sign - Last 24 Hours








 6/12/20 6/12/20 6/12/20 6/12/20





 07:42 08:00 08:00 08:26


 


Temp   98.8 98.7





   98.8 98.7


 


Pulse   126 124


 


Resp   34 42


 


B/P (MAP)   138/62 (87) 147/75


 


Pulse Ox   100 


 


O2 Delivery Trach Collar  Tracheal Collar 


 


O2 Flow Rate 10.0  10.0 


 


    





    





 6/12/20 6/12/20 6/12/20 6/12/20





 08:41 09:00 09:41 10:00


 


Temp 98.8  98.9 





 98.8  98.9 


 


Pulse 125 123  125


 


Resp 36 32 38 40


 


B/P (MAP) 158/74 143/66 (91) 153/73 159/79 (105)


 


Pulse Ox  100  98


 


O2 Delivery  Tracheal Collar  Tracheal Collar


 


O2 Flow Rate  10.0  10.0


 


    





    





 6/12/20 6/12/20 6/12/20 6/12/20





 10:41 10:44 11:00 11:14


 


Temp 99.0   





 99.0   


 


Pulse 128  125 


 


Resp 36 38 32 42


 


B/P (MAP) 158/72  160/76 (104) 


 


Pulse Ox  98 98 97


 


O2 Delivery  Tracheal Collar Tracheal Collar Tracheal Collar


 


O2 Flow Rate   10.0 10.0


 


    





    





 6/12/20 6/12/20 6/12/20 6/12/20





 11:32 11:41 12:00 12:00


 


Temp  99.0  





  99.0  


 


Pulse  130 109 


 


Resp  38 29 


 


B/P (MAP)  154/76 113/63 (80) 


 


Pulse Ox   99 


 


O2 Delivery Trach Collar  Tracheal Collar 


 


O2 Flow Rate 10.0  10.0 


 


    





    





 6/12/20 6/12/20 6/12/20 6/12/20





 12:11 13:00 14:00 15:00


 


Temp  98.8  





  98.8  


 


Pulse 126 112 114 126


 


Resp  27 30 36


 


B/P (MAP) 150/69 127/65 (85) 153/80 (104) 157/77 (103)


 


Pulse Ox  98 98 98


 


O2 Delivery  Tracheal Collar Tracheal Collar Tracheal Collar


 


O2 Flow Rate  10.0 10.0 10.0


 


    





    





 6/12/20 6/12/20 6/12/20 6/12/20





 15:52 16:00 16:00 16:34


 


Temp   99.0 





   99.0 


 


Pulse   130 


 


Resp   38 33


 


B/P (MAP)   157/66 (96) 


 


Pulse Ox   98 98


 


O2 Delivery Trach Collar  Tracheal Collar Tracheal Collar


 


O2 Flow Rate 10.0  10.0 


 


    





    





 6/12/20 6/12/20 6/12/20 6/12/20





 16:34 17:00 17:04 18:00


 


Pulse 122 114  108


 


Resp  30 30 32


 


B/P (MAP) 178/81 154/74 (100)  142/68 (92)


 


Pulse Ox  95 95 94


 


O2 Delivery  Tracheal Collar Tracheal Collar Tracheal Collar


 


O2 Flow Rate  10.0 10.0 10.0





 6/12/20 6/12/20 6/12/20 6/12/20





 19:00 20:00 20:00 20:00


 


Temp 98.2   





 98.2   


 


Pulse 111 111  112


 


Resp 26   31


 


B/P (MAP) 123/64 (83)   144/76 (98)


 


Pulse Ox 98   99


 


O2 Delivery Tracheal Collar  Trach Collar Tracheal Collar


 


O2 Flow Rate 10.0  10.0 10.0


 


    





    





 6/12/20 6/12/20 6/12/20 6/12/20





 20:36 21:00 22:00 23:00


 


Pulse  112 114 117


 


Resp  34 34 34


 


B/P (MAP)  135/71 (92) 130/66 (87) 157/81 (106)


 


Pulse Ox 97 97 99 97


 


O2 Delivery  Tracheal Collar Tracheal Collar Tracheal Collar


 


O2 Flow Rate 10.0 10.0 10.0 10.0





 6/13/20 6/13/20 6/13/20 6/13/20





 00:00 00:06 00:06 00:27


 


Temp 98.9   





 98.9   


 


Pulse 122  111 121


 


Resp 31   


 


B/P (MAP) 171/83 (112)   171/83


 


Pulse Ox 93   


 


O2 Delivery Tracheal Collar Trach Collar  


 


O2 Flow Rate 10.0 10.0  


 


    





    





 6/13/20 6/13/20 6/13/20 6/13/20





 00:57 01:00 02:00 03:00


 


Pulse  116 116 116


 


Resp  34 40 40


 


B/P (MAP)  143/75 (97) 180/91 (120) 177/74 (108)


 


Pulse Ox 93 96 94 93


 


O2 Delivery  Tracheal Collar Tracheal Collar Tracheal Collar


 


O2 Flow Rate 10.0 10.0 10.0 10.0





 6/13/20 6/13/20 6/13/20 6/13/20





 04:00 04:00 04:00 04:03


 


Temp   98.3 





   98.3 


 


Pulse  118 116 131


 


Resp   32 


 


B/P (MAP)   163/68 (99) 186/80


 


Pulse Ox   94 


 


O2 Delivery Trach Collar  Tracheal Collar 


 


O2 Flow Rate 10.0  10.0 


 


    





    





 6/13/20 6/13/20 6/13/20 6/13/20





 05:00 05:20 05:50 06:00


 


Pulse 130   121


 


Resp 40   40


 


B/P (MAP) 165/76 (105)   96/56 (69)


 


Pulse Ox 90 95 88 97


 


O2 Delivery Tracheal Collar Bag Valve Mask Tracheal Collar Ventilator


 


O2 Flow Rate 10.0 15.0 15.0 





 6/13/20 6/13/20 6/13/20 





 06:15 06:31 07:13 


 


Resp  20  


 


Pulse Ox 95 96 96 


 


O2 Delivery Ventilator Ventilator Ventilator 














Intake and Output   


 


 6/12/20 6/12/20 6/13/20





 15:00 23:00 07:00


 


Intake Total 730 ml 1835 ml 2464 ml


 


Output Total 1005 ml 645 ml 810 ml


 


Balance -275 ml 1190 ml 1654 ml











Hemodynamically unstable?:  No


Is patient in severe pain?:  No


Is NPO status required?:  No











GAETANO GONCALVES MD        Jun 13, 2020 08:02

## 2020-06-13 NOTE — NUR
Nursing Note:

Patient tachycardic, tachypneic and hypertensive, despite given pain meds as ordered. Trach 
care given, bath done, and patient still not sleeping. Given dose of ativan to see if that 
will slow RR down and get patient to rest some. Will monitor.

## 2020-06-14 VITALS — SYSTOLIC BLOOD PRESSURE: 119 MMHG | DIASTOLIC BLOOD PRESSURE: 60 MMHG

## 2020-06-14 VITALS — DIASTOLIC BLOOD PRESSURE: 75 MMHG | SYSTOLIC BLOOD PRESSURE: 144 MMHG

## 2020-06-14 VITALS — DIASTOLIC BLOOD PRESSURE: 63 MMHG | SYSTOLIC BLOOD PRESSURE: 117 MMHG

## 2020-06-14 VITALS — DIASTOLIC BLOOD PRESSURE: 58 MMHG | SYSTOLIC BLOOD PRESSURE: 114 MMHG

## 2020-06-14 VITALS — DIASTOLIC BLOOD PRESSURE: 67 MMHG | SYSTOLIC BLOOD PRESSURE: 126 MMHG

## 2020-06-14 VITALS — SYSTOLIC BLOOD PRESSURE: 138 MMHG | DIASTOLIC BLOOD PRESSURE: 79 MMHG

## 2020-06-14 VITALS — DIASTOLIC BLOOD PRESSURE: 98 MMHG | SYSTOLIC BLOOD PRESSURE: 130 MMHG

## 2020-06-14 VITALS — DIASTOLIC BLOOD PRESSURE: 78 MMHG | SYSTOLIC BLOOD PRESSURE: 154 MMHG

## 2020-06-14 VITALS — SYSTOLIC BLOOD PRESSURE: 133 MMHG | DIASTOLIC BLOOD PRESSURE: 74 MMHG

## 2020-06-14 VITALS — SYSTOLIC BLOOD PRESSURE: 111 MMHG | DIASTOLIC BLOOD PRESSURE: 59 MMHG

## 2020-06-14 VITALS — SYSTOLIC BLOOD PRESSURE: 160 MMHG | DIASTOLIC BLOOD PRESSURE: 84 MMHG

## 2020-06-14 VITALS — DIASTOLIC BLOOD PRESSURE: 65 MMHG | SYSTOLIC BLOOD PRESSURE: 119 MMHG

## 2020-06-14 VITALS — DIASTOLIC BLOOD PRESSURE: 77 MMHG | SYSTOLIC BLOOD PRESSURE: 145 MMHG

## 2020-06-14 VITALS — SYSTOLIC BLOOD PRESSURE: 126 MMHG | DIASTOLIC BLOOD PRESSURE: 68 MMHG

## 2020-06-14 VITALS — SYSTOLIC BLOOD PRESSURE: 140 MMHG | DIASTOLIC BLOOD PRESSURE: 67 MMHG

## 2020-06-14 VITALS — SYSTOLIC BLOOD PRESSURE: 134 MMHG | DIASTOLIC BLOOD PRESSURE: 70 MMHG

## 2020-06-14 VITALS — SYSTOLIC BLOOD PRESSURE: 130 MMHG | DIASTOLIC BLOOD PRESSURE: 70 MMHG

## 2020-06-14 VITALS — SYSTOLIC BLOOD PRESSURE: 118 MMHG | DIASTOLIC BLOOD PRESSURE: 64 MMHG

## 2020-06-14 VITALS — DIASTOLIC BLOOD PRESSURE: 64 MMHG | SYSTOLIC BLOOD PRESSURE: 118 MMHG

## 2020-06-14 VITALS — SYSTOLIC BLOOD PRESSURE: 93 MMHG | DIASTOLIC BLOOD PRESSURE: 75 MMHG

## 2020-06-14 VITALS — SYSTOLIC BLOOD PRESSURE: 128 MMHG | DIASTOLIC BLOOD PRESSURE: 68 MMHG

## 2020-06-14 VITALS — DIASTOLIC BLOOD PRESSURE: 78 MMHG | SYSTOLIC BLOOD PRESSURE: 152 MMHG

## 2020-06-14 LAB
ALBUMIN SERPL-MCNC: 1 G/DL (ref 3.4–5)
ALBUMIN/GLOB SERPL: 0.3 {RATIO} (ref 1–1.7)
ALP SERPL-CCNC: 82 U/L (ref 46–116)
ALT SERPL-CCNC: 29 U/L (ref 14–59)
ANION GAP SERPL CALC-SCNC: 8 MMOL/L (ref 6–14)
AST SERPL-CCNC: 31 U/L (ref 15–37)
BASE EXCESS ABG: -1 MMOL/L (ref -3–3)
BASOPHILS # BLD AUTO: 0 X10^3/UL (ref 0–0.2)
BASOPHILS NFR BLD: 0 % (ref 0–3)
BILIRUB SERPL-MCNC: 0.4 MG/DL (ref 0.2–1)
BUN SERPL-MCNC: 36 MG/DL (ref 7–20)
BUN/CREAT SERPL: 45 (ref 6–20)
CALCIUM SERPL-MCNC: 9.2 MG/DL (ref 8.5–10.1)
CHLORIDE SERPL-SCNC: 113 MMOL/L (ref 98–107)
CO2 SERPL-SCNC: 24 MMOL/L (ref 21–32)
CREAT SERPL-MCNC: 0.8 MG/DL (ref 0.6–1)
EOSINOPHIL NFR BLD: 0 % (ref 0–3)
EOSINOPHIL NFR BLD: 0 X10^3/UL (ref 0–0.7)
ERYTHROCYTE [DISTWIDTH] IN BLOOD BY AUTOMATED COUNT: 18 % (ref 11.5–14.5)
GFR SERPLBLD BASED ON 1.73 SQ M-ARVRAT: 76.2 ML/MIN
GLOBULIN SER-MCNC: 3.6 G/DL (ref 2.2–3.8)
GLUCOSE SERPL-MCNC: 227 MG/DL (ref 70–99)
HCO3 BLDA-SCNC: 23 MMOL/L (ref 21–28)
HCT VFR BLD CALC: 25 % (ref 36–47)
HGB BLD-MCNC: 8.6 G/DL (ref 12–15.5)
INSPIRATION SETTING TIME VENT: 40
LYMPHOCYTES # BLD: 1.3 X10^3/UL (ref 1–4.8)
LYMPHOCYTES NFR BLD AUTO: 17 % (ref 24–48)
MAGNESIUM SERPL-MCNC: 2.5 MG/DL (ref 1.8–2.4)
MCH RBC QN AUTO: 29 PG (ref 25–35)
MCHC RBC AUTO-ENTMCNC: 34 G/DL (ref 31–37)
MCV RBC AUTO: 85 FL (ref 79–100)
MONO #: 0.3 X10^3/UL (ref 0–1.1)
MONOCYTES NFR BLD: 4 % (ref 0–9)
NEUT #: 5.9 X10^3/UL (ref 1.8–7.7)
NEUTROPHILS NFR BLD AUTO: 79 % (ref 31–73)
PCO2 BLDA: 34 MMHG (ref 35–46)
PHOSPHATE SERPL-MCNC: 3.7 MG/DL (ref 2.6–4.7)
PLATELET # BLD AUTO: 382 X10^3/UL (ref 140–400)
PO2 BLDA: 146 MMHG (ref 75–108)
POTASSIUM SERPL-SCNC: 3.8 MMOL/L (ref 3.5–5.1)
PROT SERPL-MCNC: 4.6 G/DL (ref 6.4–8.2)
RBC # BLD AUTO: 2.95 X10^6/UL (ref 3.5–5.4)
SAO2 % BLDA: 98 % (ref 92–99)
SODIUM SERPL-SCNC: 145 MMOL/L (ref 136–145)
WBC # BLD AUTO: 7.5 X10^3/UL (ref 4–11)

## 2020-06-14 RX ADMIN — Medication PRN EACH: at 10:30

## 2020-06-14 RX ADMIN — BACITRACIN SCH MLS/HR: 5000 INJECTION, POWDER, FOR SOLUTION INTRAMUSCULAR at 17:45

## 2020-06-14 RX ADMIN — ONDANSETRON PRN MG: 2 INJECTION INTRAMUSCULAR; INTRAVENOUS at 16:02

## 2020-06-14 RX ADMIN — IPRATROPIUM BROMIDE AND ALBUTEROL SULFATE SCH ML: .5; 3 SOLUTION RESPIRATORY (INHALATION) at 08:23

## 2020-06-14 RX ADMIN — IPRATROPIUM BROMIDE AND ALBUTEROL SULFATE SCH ML: .5; 3 SOLUTION RESPIRATORY (INHALATION) at 20:18

## 2020-06-14 RX ADMIN — INSULIN LISPRO SCH UNITS: 100 INJECTION, SOLUTION INTRAVENOUS; SUBCUTANEOUS at 00:42

## 2020-06-14 RX ADMIN — ONDANSETRON PRN MG: 2 INJECTION INTRAMUSCULAR; INTRAVENOUS at 01:35

## 2020-06-14 RX ADMIN — PROPOFOL PRN MLS/HR: 10 INJECTION, EMULSION INTRAVENOUS at 11:09

## 2020-06-14 RX ADMIN — IPRATROPIUM BROMIDE AND ALBUTEROL SULFATE SCH ML: .5; 3 SOLUTION RESPIRATORY (INHALATION) at 04:00

## 2020-06-14 RX ADMIN — ONDANSETRON PRN MG: 2 INJECTION INTRAMUSCULAR; INTRAVENOUS at 10:02

## 2020-06-14 RX ADMIN — DILTIAZEM HYDROCHLORIDE SCH MLS/HR: 5 INJECTION INTRAVENOUS at 11:10

## 2020-06-14 RX ADMIN — INSULIN LISPRO SCH UNITS: 100 INJECTION, SOLUTION INTRAVENOUS; SUBCUTANEOUS at 06:06

## 2020-06-14 RX ADMIN — PANTOPRAZOLE SODIUM SCH MG: 40 INJECTION, POWDER, FOR SOLUTION INTRAVENOUS at 08:58

## 2020-06-14 RX ADMIN — IPRATROPIUM BROMIDE AND ALBUTEROL SULFATE SCH ML: .5; 3 SOLUTION RESPIRATORY (INHALATION) at 11:34

## 2020-06-14 RX ADMIN — ACETYLCYSTEINE SCH MG: 200 INHALANT RESPIRATORY (INHALATION) at 08:23

## 2020-06-14 RX ADMIN — INSULIN LISPRO SCH UNITS: 100 INJECTION, SOLUTION INTRAVENOUS; SUBCUTANEOUS at 12:21

## 2020-06-14 RX ADMIN — IPRATROPIUM BROMIDE AND ALBUTEROL SULFATE SCH ML: .5; 3 SOLUTION RESPIRATORY (INHALATION) at 00:40

## 2020-06-14 RX ADMIN — IPRATROPIUM BROMIDE AND ALBUTEROL SULFATE SCH ML: .5; 3 SOLUTION RESPIRATORY (INHALATION) at 16:26

## 2020-06-14 RX ADMIN — DILTIAZEM HYDROCHLORIDE SCH MLS/HR: 5 INJECTION INTRAVENOUS at 08:58

## 2020-06-14 RX ADMIN — ACETYLCYSTEINE SCH MG: 200 INHALANT RESPIRATORY (INHALATION) at 21:00

## 2020-06-14 RX ADMIN — BACITRACIN SCH MLS/HR: 5000 INJECTION, POWDER, FOR SOLUTION INTRAMUSCULAR at 05:18

## 2020-06-14 RX ADMIN — INSULIN LISPRO SCH UNITS: 100 INJECTION, SOLUTION INTRAVENOUS; SUBCUTANEOUS at 17:53

## 2020-06-14 RX ADMIN — DILTIAZEM HYDROCHLORIDE SCH MLS/HR: 5 INJECTION INTRAVENOUS at 21:32

## 2020-06-14 NOTE — RAD
EXAM: PORTABLE CHEST 1V

 

INDICATION: Reason: Pleural Effusions, Resp Failure / Spl. Instructions:  

/ History: .

 

TECHNIQUE: Single view 

 

COMPARISON: 6/13/2020 chest x-ray

 

FINDINGS:

 

Stable tracheostomy tube and left PICC line with tip near the cavoatrial 

junction. Enteric tube remains present as well, passing below the 

diaphragms.

 

The heart size is normal.

 

The great vessels appear unremarkable.

 

There is no hilar or mediastinal mass.

 

Lungs show pulmonary vascular congestion and bibasilar opacities with 

improved aeration of the right middle lobe.

 

Bilateral pleural effusions remain present, apparently decreased in size 

on the left . No pneumothorax.

 

There are no significant osseous abnormalities.

 

IMPRESSION:

 

Bilateral pleural effusions and associated atelectasis with apparent 

slight decrease in left pleural effusion and improving right middle lobe 

atelectasis/consolidation.

 

 

Electronically signed by: Adriana Forrest MD (6/14/2020 8:07 AM) 

[id number]

## 2020-06-14 NOTE — PDOC
PROGRESS NOTES


Chief Complaint


Chief Complaint


A/P:


Acute hypoxic Respiratory failure requiring mechanical ventilation (now 

extubated for several days but still with tracheostomy)


Tracheostomy


bilateral pleural effusions/pulm edema 


Sepsis


Severe Acute gallstone pancreatitis (not a surgical candidate at this time) with

necrosis


Acute kidney failure now requiring dialysis


Salpingitis


Gallstones (Calculus of gallbladder with acute cholecystitis without 

obstruction)


HTN 


Leukocytosis 


Hypoxia


Uterine fibroid


Intractable pain


Intractable nausea


Covid 19 negative. 


Acute on chronic anemia 


EEG: No seizure activityFever  - better currently - intermittent could be from 

underlying pancreatitis blood cults 5/4 - neg so far


? Ileus with vomiting


Abd distention - U/S and CT reviewed s/p 0.4 L of opaque, debris-containing 

ascites was removed 5/6


Acute pancreatitis with persistent necrosis


- 4/27 status post ROBERT drain placement + C paropsilosis. s/p additional drains 5 /8


Anemia - S/p PRBCs


Cholelithiasis with thickening of the gallbladder wall.


Leucocytosis improving


JED, hyperkalemia, Metabolic acidosis off dialysis


Acute hypoxic resp failure ,bilateral pleural effusion and atelectasis


hypocalcemia 


Prediabetes


HTN


s/p trach


ESRD on HD


Hyperglycemia





FEN - 


PPX - SCDs, off lovenox currently


FULL CODE


Dispo - ICU, critically ill


CC time 49 minutes





History of Present Illness


History of Present Illness


6/8: IR placed drain on 6/7. 4u PRBC after Hb drop. Hb 8.8 today. Off Levophed 

this morning. T-max 100.3. Much more lethargic today. CXR with left sided dif

fuse infiltrates.


6/9: Tachycardic overnight into the 140s. NGT clamped. On BIPAP currently. 

Drains with serosanguinous discharge. WBC 8, Tmax 99.6F.


6/10: Seen on trach shield in ICU.  Hypertensive and tachycardic. Labs stable. 

blood stained drainage from drains. Afebrile.


6/11: Seen on trach shield in ICU. She is a bit confused, drowsy, but when 

sitting up is conversational and confusion somewhat clears. She is asking for 

more pain medication. Stable drains, still very tachy.Na 147


6/12: Patient vomited overnight. Aspirated. Tried to pull her trach out, she was

told she would die without her trach, she said "I know, I just want to go home".

Hb 7.6. Afebrile, still very tachycardic. 1055ml out of right sided ROBERT drain


6/13: Overnight hypoxic, on BIPAP. CXR with left sided white out lung. 

Significant mucous plug suctioned by RT with improvement in her ABG after 2 

hours this morning. Not really active, tired, lethargic. 890ml out of drains 

past 24 hours. On vent. D/w daughter bedside.





Still on vent overnight with copious pulmonary drainage on suctioning. Labs 

stable, UOP stable 1L. Drain output still significant with 1505mL. She has shown

her phone password [phone] and made it clear she does not want her daughters to 

access her phone at this time.





6/5/2020


Patient seen and examined on telemetry floor today


This morning I had a couple of discussions with case management


The issue is the patient will not wear her trach cap


Without that she cannot advance her diet and is currently supposed to be on 

honey thick liquids


I was going to talk to the patient about wearing her trach But when I arrived to

the room she was lying on the floor with several nurses and therapist around


Apparently she did walk to the end of the garsia then back and then collapsed onto

the floor


She had a bowel movement when this all happened


Appears to be critically ill again


Very shaky tremulous anxious has a stare in her eyes


We got her back in bed


I am extremely concerned about her long-term prognosis again











6/4/2020


Patient seen and examined in the ICU


She remains critically ill is is extremely weak


Chart reviewed


Discussed with RN


We decided to try some Prozac as she does seem depressed


On IV TPN


Still has NG tube








6/3/2020


Patient seen and examined in the ICU


She remains critically ill


We have been trying to hold off on her Ativan for the past 24 hours


She is a little more awake but shaky and agitated and anxious seems depressed


Discussed with RN


Chart reviewed








6/2/2020


Patient seen and examined in the ICU


She is a little more alert today but still quite ill


Appears clammy and pale and depressed/anxious


Discussed with RN


Chart reviewed











6/1/2020


Patient seen and examined in the ICU


She appears extremely ill


She is tachypneic at 35 respirations per minute and tachycardic at 132 bpm


She is extremely encephalopathic and shaky


She appears clammy


Chart reviewed


Discussed with RN


Prognosis extremely guarded at best








5/31/2020


Patient still in ICU


Resting with no apparent distress


Chart reviewed








5/30/2020


Patient seen and examined in the ICU


She is wiping her face with a cough


Discussed with RN


Chart reviewed


We hope to get her out of the ICU later today if possible








5/29/2020


Patient seen and examined in the ICU once again


She is back on NG suction


On IV Zosyn


Has IV TPN


Sedated with Precedex but anxious still


Appears somewhat clammy and pale


Chart reviewed


Discussed with RN


She remains critically ill














5/28/2020


Patient seen and examined in the ICU


She has an NG that is clamped we are hoping to start some clear liquids but she 

looks quite ill


She is on IV TPN


Meropenem changed to IV Zosyn (agree)


She has [place] to bedside drainage


She is semi-sedated with Precedex


Chart reviewed


Discussed with RN


She remains critically ill











Seen bedside. Hb 8. She was just a bit hypoxic, had a mucous plug suctioned. 

Able to vocalize well with speaking valve, tells me we are being very hard on 

her and she would like to be drugged back to sleep.  She wants to wake up and 

feel better. On trach shield, 


T max 100.4. afebrile this a.m.





5/26/2020


Patient seen and examined in the ICU


She is up in the chair very frail trying to talk a little shaky


Discussed with RN


Chart reviewed








5/25/2020


Patient seen and examined in the ICU


Patient up in the chair


Having a severe coughing episode with a lot of phlegm coming out of her 

tracheostomy


Discussed with RN


Discussed with physical therapy


Chart reviewed











5/24,.    feels well,  has been out of room in wheelchair


no complaint,    still weak,   some with not wanting to wear her valve on trach


cont other,   may be able to work with speech tomorrow,    videoswallow OK to 

try, 








5/23,  anxiety is up today,   she dislikes the valve still,    


shower and outside today


.   





5/22 she doesnt want to wear her passy-kristan valve,  discussed str and plan with 

her.


speech following,  needs swallow study,   but needs to wear her valve longer,  


cont current


able to walk some, walker 





5/21  stronger,   better,   


we discussed better oral care


she would like to try swallow study,  wants to try to eat,    speech is 

following








5/20/2020 


She remains in the ICU


sitting up and working with OT,   getting better


if limit pain meds, may do better off the vent, 





Nurses trying to suction her,  that is also improved, 


Chart reviewed


 








5/19/2020


Patient seen and examined in the ICU


She had an episode yesterday of tachycardia and severe agitation we gave her 

some Ativan


After that she seemed to have stroke symptoms but now that the Ativan has wore 

off her stroke symptoms have resolved


 


 


She is on IV meropenem and daptomycin and micafungin


Chart reviewed


Discussed with RN


Patient is still critically ill


 


BRIEF OPERATIVE NOTE


Pre-Op Diagnosis


Pancreatitis with pseudocysts, suspected infection


Post-Op Diagnosis


same


Procedure Performed


CT abdominal Drains x 3


Surgeon


Hardy


Anesthesia Type:  Conscious Sedation


Findings


3 abdominal drains, 14F, with turbid pancreatic fluid and necrotic debris in 

each.


Complications


No immediate








5/9: Patient today somewhat restless and having bilious secretions from ET tube,

imaging studies ordered, discussed with consultant. Pretty poor prognosis, 

hopefully is not a fistula, poor surgical candidate. 





5/10: Imaging with no acute events, she seems more stable today compared to 

yesterday. Encouraged as much activity as possible patient at high risk for 

severe depression.





Vitals


Vitals





Vital Signs








  Date Time  Temp Pulse Resp B/P (MAP) Pulse Ox O2 Delivery O2 Flow Rate FiO2


 


6/14/20 09:00  69 20 119/60 (79) 99 Ventilator  


 


6/14/20 08:00 97.6       





 97.6       











Physical Exam


Physical Exam


GENERAL: Lethargic, opens eyes transiently


HEENT: NGT in place. Oral mucosa dry


NECK:  Tracheostomy 


LUNGS: Diminished aeration bases, no accessory muscle use 


HEART:  S1, S2, tachy, regular 


ABDOMEN: Mild distention, bowel sounds present, soft, grimaces to palpation 


Right lateral side drainage bag, 3 drains with bloodstained drainage


: [place] ( 6/ 7)


EXTREMITIES: Trace edema .no  cyanosis 


SKIN: no signs of gen rash 


CNS: Lethargic very weak


LUE-PICC (5/29) without signs of complications - art line without complications


General:  No acute distress


Heart:  Regular rate, Normal S1, Normal S2, No murmurs, Gallops


Lungs:  Other (diminshed in bases, Rhonci in LLL)


Abdomen:  Other (drains in place)


Extremities:  No clubbing, No cyanosis, No edema, Normal pulses, No tenderness

/swelling


Skin:  Other (warm, dry)





Labs


LABS





Laboratory Tests








Test


 6/13/20


11:24 6/13/20


15:08 6/13/20


18:19 6/13/20


20:10


 


Glucose (Fingerstick)


 187 mg/dL


(70-99) 


 220 mg/dL


(70-99) 





 


White Blood Count


 


 9.3 x10^3/uL


(4.0-11.0) 


 8.4 x10^3/uL


(4.0-11.0)


 


Red Blood Count


 


 2.78 x10^6/uL


(3.50-5.40) 


 3.04 x10^6/uL


(3.50-5.40)


 


Hemoglobin


 


 8.1 g/dL


(12.0-15.5) 


 9.0 g/dL


(12.0-15.5)


 


Hematocrit


 


 23.5 %


(36.0-47.0) 


 26.0 %


(36.0-47.0)


 


Mean Corpuscular Volume  85 fL ()   86 fL () 


 


Mean Corpuscular Hemoglobin  29 pg (25-35)   30 pg (25-35) 


 


Mean Corpuscular Hemoglobin


Concent 


 34 g/dL


(31-37) 


 35 g/dL


(31-37)


 


Red Cell Distribution Width


 


 19.1 %


(11.5-14.5) 


 18.7 %


(11.5-14.5)


 


Platelet Count


 


 380 x10^3/uL


(140-400) 


 388 x10^3/uL


(140-400)


 


Magnesium Level


 


 1.5 mg/dL


(1.8-2.4) 


 





 


Test


 6/14/20


00:38 6/14/20


05:30 


 





 


Glucose (Fingerstick)


 188 mg/dL


(70-99) 


 


 





 


White Blood Count


 


 7.5 x10^3/uL


(4.0-11.0) 


 





 


Red Blood Count


 


 2.95 x10^6/uL


(3.50-5.40) 


 





 


Hemoglobin


 


 8.6 g/dL


(12.0-15.5) 


 





 


Hematocrit


 


 25.0 %


(36.0-47.0) 


 





 


Mean Corpuscular Volume  85 fL ()   


 


Mean Corpuscular Hemoglobin  29 pg (25-35)   


 


Mean Corpuscular Hemoglobin


Concent 


 34 g/dL


(31-37) 


 





 


Red Cell Distribution Width


 


 18.0 %


(11.5-14.5) 


 





 


Platelet Count


 


 382 x10^3/uL


(140-400) 


 





 


Neutrophils (%) (Auto)  79 % (31-73)   


 


Lymphocytes (%) (Auto)  17 % (24-48)   


 


Monocytes (%) (Auto)  4 % (0-9)   


 


Eosinophils (%) (Auto)  0 % (0-3)   


 


Basophils (%) (Auto)  0 % (0-3)   


 


Neutrophils # (Auto)


 


 5.9 x10^3/uL


(1.8-7.7) 


 





 


Lymphocytes # (Auto)


 


 1.3 x10^3/uL


(1.0-4.8) 


 





 


Monocytes # (Auto)


 


 0.3 x10^3/uL


(0.0-1.1) 


 





 


Eosinophils # (Auto)


 


 0.0 x10^3/uL


(0.0-0.7) 


 





 


Basophils # (Auto)


 


 0.0 x10^3/uL


(0.0-0.2) 


 





 


Sodium Level


 


 145 mmol/L


(136-145) 


 





 


Potassium Level


 


 3.8 mmol/L


(3.5-5.1) 


 





 


Chloride Level


 


 113 mmol/L


() 


 





 


Carbon Dioxide Level


 


 24 mmol/L


(21-32) 


 





 


Anion Gap  8 (6-14)   


 


Blood Urea Nitrogen


 


 36 mg/dL


(7-20) 


 





 


Creatinine


 


 0.8 mg/dL


(0.6-1.0) 


 





 


Estimated GFR


(Cockcroft-Gault) 


 76.2 


 


 





 


BUN/Creatinine Ratio  45 (6-20)   


 


Glucose Level


 


 227 mg/dL


(70-99) 


 





 


Calcium Level


 


 9.2 mg/dL


(8.5-10.1) 


 





 


Phosphorus Level


 


 3.7 mg/dL


(2.6-4.7) 


 





 


Magnesium Level


 


 2.5 mg/dL


(1.8-2.4) 


 





 


Total Bilirubin


 


 0.4 mg/dL


(0.2-1.0) 


 





 


Aspartate Amino Transf


(AST/SGOT) 


 31 U/L (15-37) 


 


 





 


Alanine Aminotransferase


(ALT/SGPT) 


 29 U/L (14-59) 


 


 





 


Alkaline Phosphatase


 


 82 U/L


() 


 





 


Total Protein


 


 4.6 g/dL


(6.4-8.2) 


 





 


Albumin


 


 1.0 g/dL


(3.4-5.0) 


 





 


Albumin/Globulin Ratio  0.3 (1.0-1.7)   











Assessment and Plan


Assessmemt and Plan


Problems


Medical Problems:


(1) Acute pancreatitis


Status: Acute  





(2) Cholelithiasis


Status: Acute  











Comment


Review of Relevant


I have reviewed the following items josy (where applicable) has been applied.


Labs





Laboratory Tests








Test


 6/12/20


12:05 6/12/20


12:45 6/12/20


17:11 6/12/20


23:56


 


Glucose (Fingerstick)


 158 mg/dL


(70-99) 


 145 mg/dL


(70-99) 140 mg/dL


(70-99)


 


White Blood Count


 


 7.0 x10^3/uL


(4.0-11.0) 


 





 


Red Blood Count


 


 2.99 x10^6/uL


(3.50-5.40) 


 





 


Hemoglobin


 


 8.5 g/dL


(12.0-15.5) 


 





 


Hematocrit


 


 25.7 %


(36.0-47.0) 


 





 


Mean Corpuscular Volume  86 fL ()   


 


Mean Corpuscular Hemoglobin  29 pg (25-35)   


 


Mean Corpuscular Hemoglobin


Concent 


 33 g/dL


(31-37) 


 





 


Red Cell Distribution Width


 


 19.7 %


(11.5-14.5) 


 





 


Platelet Count


 


 347 x10^3/uL


(140-400) 


 





 


Test


 6/13/20


04:00 6/13/20


05:20 6/13/20


07:50 6/13/20


11:24


 


White Blood Count


 9.4 x10^3/uL


(4.0-11.0) 


 


 





 


Red Blood Count


 3.27 x10^6/uL


(3.50-5.40) 


 


 





 


Hemoglobin


 9.2 g/dL


(12.0-15.5) 


 


 





 


Hematocrit


 27.7 %


(36.0-47.0) 


 


 





 


Mean Corpuscular Volume 85 fL ()    


 


Mean Corpuscular Hemoglobin 28 pg (25-35)    


 


Mean Corpuscular Hemoglobin


Concent 33 g/dL


(31-37) 


 


 





 


Red Cell Distribution Width


 20.1 %


(11.5-14.5) 


 


 





 


Platelet Count


 433 x10^3/uL


(140-400) 


 


 





 


Neutrophils (%) (Auto) 76 % (31-73)    


 


Lymphocytes (%) (Auto) 17 % (24-48)    


 


Monocytes (%) (Auto) 5 % (0-9)    


 


Eosinophils (%) (Auto) 2 % (0-3)    


 


Basophils (%) (Auto) 0 % (0-3)    


 


Neutrophils # (Auto)


 7.2 x10^3/uL


(1.8-7.7) 


 


 





 


Lymphocytes # (Auto)


 1.6 x10^3/uL


(1.0-4.8) 


 


 





 


Monocytes # (Auto)


 0.5 x10^3/uL


(0.0-1.1) 


 


 





 


Eosinophils # (Auto)


 0.2 x10^3/uL


(0.0-0.7) 


 


 





 


Basophils # (Auto)


 0.0 x10^3/uL


(0.0-0.2) 


 


 





 


Sodium Level


 146 mmol/L


(136-145) 


 


 





 


Potassium Level


 3.8 mmol/L


(3.5-5.1) 


 


 





 


Chloride Level


 112 mmol/L


() 


 


 





 


Carbon Dioxide Level


 24 mmol/L


(21-32) 


 


 





 


Anion Gap 10 (6-14)    


 


Blood Urea Nitrogen


 25 mg/dL


(7-20) 


 


 





 


Creatinine


 0.7 mg/dL


(0.6-1.0) 


 


 





 


Estimated GFR


(Cockcroft-Gault) 88.9 


 


 


 





 


Glucose Level


 151 mg/dL


(70-99) 


 


 





 


Calcium Level


 10.1 mg/dL


(8.5-10.1) 


 


 





 


O2 Saturation  89 % (92-99)  99 % (92-99)  


 


Arterial Blood pH


 


 7.29


(7.35-7.45) 7.39


(7.35-7.45) 





 


Arterial Blood pCO2 at


Patient Temp 


 50 mmHg


(35-46) 38 mmHg


(35-46) 





 


Arterial Blood pO2 at Patient


Temp 


 64 mmHg


() 177 mmHg


() 





 


Arterial Blood HCO3


 


 24 mmol/L


(21-28) 23 mmol/L


(21-28) 





 


Arterial Blood Base Excess


 


 -3 mmol/L


(-3-3) -2 mmol/L


(-3-3) 





 


FiO2   75  


 


Glucose (Fingerstick)


 


 


 


 187 mg/dL


(70-99)


 


Test


 6/13/20


15:08 6/13/20


18:19 6/13/20


20:10 6/14/20


00:38


 


White Blood Count


 9.3 x10^3/uL


(4.0-11.0) 


 8.4 x10^3/uL


(4.0-11.0) 





 


Red Blood Count


 2.78 x10^6/uL


(3.50-5.40) 


 3.04 x10^6/uL


(3.50-5.40) 





 


Hemoglobin


 8.1 g/dL


(12.0-15.5) 


 9.0 g/dL


(12.0-15.5) 





 


Hematocrit


 23.5 %


(36.0-47.0) 


 26.0 %


(36.0-47.0) 





 


Mean Corpuscular Volume 85 fL ()   86 fL ()  


 


Mean Corpuscular Hemoglobin 29 pg (25-35)   30 pg (25-35)  


 


Mean Corpuscular Hemoglobin


Concent 34 g/dL


(31-37) 


 35 g/dL


(31-37) 





 


Red Cell Distribution Width


 19.1 %


(11.5-14.5) 


 18.7 %


(11.5-14.5) 





 


Platelet Count


 380 x10^3/uL


(140-400) 


 388 x10^3/uL


(140-400) 





 


Magnesium Level


 1.5 mg/dL


(1.8-2.4) 


 


 





 


Glucose (Fingerstick)


 


 220 mg/dL


(70-99) 


 188 mg/dL


(70-99)


 


Test


 6/14/20


05:30 


 


 





 


White Blood Count


 7.5 x10^3/uL


(4.0-11.0) 


 


 





 


Red Blood Count


 2.95 x10^6/uL


(3.50-5.40) 


 


 





 


Hemoglobin


 8.6 g/dL


(12.0-15.5) 


 


 





 


Hematocrit


 25.0 %


(36.0-47.0) 


 


 





 


Mean Corpuscular Volume 85 fL ()    


 


Mean Corpuscular Hemoglobin 29 pg (25-35)    


 


Mean Corpuscular Hemoglobin


Concent 34 g/dL


(31-37) 


 


 





 


Red Cell Distribution Width


 18.0 %


(11.5-14.5) 


 


 





 


Platelet Count


 382 x10^3/uL


(140-400) 


 


 





 


Neutrophils (%) (Auto) 79 % (31-73)    


 


Lymphocytes (%) (Auto) 17 % (24-48)    


 


Monocytes (%) (Auto) 4 % (0-9)    


 


Eosinophils (%) (Auto) 0 % (0-3)    


 


Basophils (%) (Auto) 0 % (0-3)    


 


Neutrophils # (Auto)


 5.9 x10^3/uL


(1.8-7.7) 


 


 





 


Lymphocytes # (Auto)


 1.3 x10^3/uL


(1.0-4.8) 


 


 





 


Monocytes # (Auto)


 0.3 x10^3/uL


(0.0-1.1) 


 


 





 


Eosinophils # (Auto)


 0.0 x10^3/uL


(0.0-0.7) 


 


 





 


Basophils # (Auto)


 0.0 x10^3/uL


(0.0-0.2) 


 


 





 


Sodium Level


 145 mmol/L


(136-145) 


 


 





 


Potassium Level


 3.8 mmol/L


(3.5-5.1) 


 


 





 


Chloride Level


 113 mmol/L


() 


 


 





 


Carbon Dioxide Level


 24 mmol/L


(21-32) 


 


 





 


Anion Gap 8 (6-14)    


 


Blood Urea Nitrogen


 36 mg/dL


(7-20) 


 


 





 


Creatinine


 0.8 mg/dL


(0.6-1.0) 


 


 





 


Estimated GFR


(Cockcroft-Gault) 76.2 


 


 


 





 


BUN/Creatinine Ratio 45 (6-20)    


 


Glucose Level


 227 mg/dL


(70-99) 


 


 





 


Calcium Level


 9.2 mg/dL


(8.5-10.1) 


 


 





 


Phosphorus Level


 3.7 mg/dL


(2.6-4.7) 


 


 





 


Magnesium Level


 2.5 mg/dL


(1.8-2.4) 


 


 





 


Total Bilirubin


 0.4 mg/dL


(0.2-1.0) 


 


 





 


Aspartate Amino Transf


(AST/SGOT) 31 U/L (15-37) 


 


 


 





 


Alanine Aminotransferase


(ALT/SGPT) 29 U/L (14-59) 


 


 


 





 


Alkaline Phosphatase


 82 U/L


() 


 


 





 


Total Protein


 4.6 g/dL


(6.4-8.2) 


 


 





 


Albumin


 1.0 g/dL


(3.4-5.0) 


 


 





 


Albumin/Globulin Ratio 0.3 (1.0-1.7)    








Laboratory Tests








Test


 6/13/20


11:24 6/13/20


15:08 6/13/20


18:19 6/13/20


20:10


 


Glucose (Fingerstick)


 187 mg/dL


(70-99) 


 220 mg/dL


(70-99) 





 


White Blood Count


 


 9.3 x10^3/uL


(4.0-11.0) 


 8.4 x10^3/uL


(4.0-11.0)


 


Red Blood Count


 


 2.78 x10^6/uL


(3.50-5.40) 


 3.04 x10^6/uL


(3.50-5.40)


 


Hemoglobin


 


 8.1 g/dL


(12.0-15.5) 


 9.0 g/dL


(12.0-15.5)


 


Hematocrit


 


 23.5 %


(36.0-47.0) 


 26.0 %


(36.0-47.0)


 


Mean Corpuscular Volume  85 fL ()   86 fL () 


 


Mean Corpuscular Hemoglobin  29 pg (25-35)   30 pg (25-35) 


 


Mean Corpuscular Hemoglobin


Concent 


 34 g/dL


(31-37) 


 35 g/dL


(31-37)


 


Red Cell Distribution Width


 


 19.1 %


(11.5-14.5) 


 18.7 %


(11.5-14.5)


 


Platelet Count


 


 380 x10^3/uL


(140-400) 


 388 x10^3/uL


(140-400)


 


Magnesium Level


 


 1.5 mg/dL


(1.8-2.4) 


 





 


Test


 6/14/20


00:38 6/14/20


05:30 


 





 


Glucose (Fingerstick)


 188 mg/dL


(70-99) 


 


 





 


White Blood Count


 


 7.5 x10^3/uL


(4.0-11.0) 


 





 


Red Blood Count


 


 2.95 x10^6/uL


(3.50-5.40) 


 





 


Hemoglobin


 


 8.6 g/dL


(12.0-15.5) 


 





 


Hematocrit


 


 25.0 %


(36.0-47.0) 


 





 


Mean Corpuscular Volume  85 fL ()   


 


Mean Corpuscular Hemoglobin  29 pg (25-35)   


 


Mean Corpuscular Hemoglobin


Concent 


 34 g/dL


(31-37) 


 





 


Red Cell Distribution Width


 


 18.0 %


(11.5-14.5) 


 





 


Platelet Count


 


 382 x10^3/uL


(140-400) 


 





 


Neutrophils (%) (Auto)  79 % (31-73)   


 


Lymphocytes (%) (Auto)  17 % (24-48)   


 


Monocytes (%) (Auto)  4 % (0-9)   


 


Eosinophils (%) (Auto)  0 % (0-3)   


 


Basophils (%) (Auto)  0 % (0-3)   


 


Neutrophils # (Auto)


 


 5.9 x10^3/uL


(1.8-7.7) 


 





 


Lymphocytes # (Auto)


 


 1.3 x10^3/uL


(1.0-4.8) 


 





 


Monocytes # (Auto)


 


 0.3 x10^3/uL


(0.0-1.1) 


 





 


Eosinophils # (Auto)


 


 0.0 x10^3/uL


(0.0-0.7) 


 





 


Basophils # (Auto)


 


 0.0 x10^3/uL


(0.0-0.2) 


 





 


Sodium Level


 


 145 mmol/L


(136-145) 


 





 


Potassium Level


 


 3.8 mmol/L


(3.5-5.1) 


 





 


Chloride Level


 


 113 mmol/L


() 


 





 


Carbon Dioxide Level


 


 24 mmol/L


(21-32) 


 





 


Anion Gap  8 (6-14)   


 


Blood Urea Nitrogen


 


 36 mg/dL


(7-20) 


 





 


Creatinine


 


 0.8 mg/dL


(0.6-1.0) 


 





 


Estimated GFR


(Cockcroft-Gault) 


 76.2 


 


 





 


BUN/Creatinine Ratio  45 (6-20)   


 


Glucose Level


 


 227 mg/dL


(70-99) 


 





 


Calcium Level


 


 9.2 mg/dL


(8.5-10.1) 


 





 


Phosphorus Level


 


 3.7 mg/dL


(2.6-4.7) 


 





 


Magnesium Level


 


 2.5 mg/dL


(1.8-2.4) 


 





 


Total Bilirubin


 


 0.4 mg/dL


(0.2-1.0) 


 





 


Aspartate Amino Transf


(AST/SGOT) 


 31 U/L (15-37) 


 


 





 


Alanine Aminotransferase


(ALT/SGPT) 


 29 U/L (14-59) 


 


 





 


Alkaline Phosphatase


 


 82 U/L


() 


 





 


Total Protein


 


 4.6 g/dL


(6.4-8.2) 


 





 


Albumin


 


 1.0 g/dL


(3.4-5.0) 


 





 


Albumin/Globulin Ratio  0.3 (1.0-1.7)   








Microbiology


6/7/20 Gram Stain - Final, Complete


         


6/7/20 Aerobic and Anaerobic Culture - Final, Complete


         


6/7/20 Antimicrobic Susceptibility - Final, Complete


         


6/7/20 Blood Culture - Final, Complete


         NO GROWTH AFTER 5 DAYS


6/7/20 Urine Culture - Final, Complete


         


5/30/20 Gram Stain - Final, Complete


          


5/30/20 Aerobic Culture - Final, Complete


Medications





Current Medications


Sodium Chloride 1,000 ml @  1,000 mls/hr Q1H IV  Last administered on 3/16/20at 

03:00;  Start 3/16/20 at 03:00;  Stop 3/16/20 at 03:59;  Status DC


Ondansetron HCl (Zofran) 4 mg 1X  ONCE IVP  Last administered on 3/16/20at 

03:27;  Start 3/16/20 at 03:00;  Stop 3/16/20 at 03:01;  Status DC


Morphine Sulfate (Morphine Sulfate) 4 mg 1X  ONCE IV ;  Start 3/16/20 at 03:00; 

Stop 3/16/20 at 03:01;  Status Cancel


Ketorolac Tromethamine (Toradol 30mg Vial) 30 mg 1X  ONCE IV  Last administered 

on 3/16/20at 02:54;  Start 3/16/20 at 03:00;  Stop 3/16/20 at 03:01;  Status DC


Fentanyl Citrate (Fentanyl 2ml Vial) 25 mcg 1X  ONCE IVP  Last administered on 

3/16/20at 03:23;  Start 3/16/20 at 03:30;  Stop 3/16/20 at 03:31;  Status DC


Fentanyl Citrate (Fentanyl 2ml Vial) 100 mcg STK-MED ONCE .ROUTE ;  Start 

3/16/20 at 03:18;  Stop 3/16/20 at 03:18;  Status DC


Iohexol (Omnipaque 350 Mg/ml) 90 ml 1X  ONCE IV  Last administered on 3/16/20at 

03:25;  Start 3/16/20 at 03:30;  Stop 3/16/20 at 03:31;  Status DC


Info (CONTRAST GIVEN -- Rx MONITORING) 1 each PRN DAILY  PRN MC SEE COMMENTS;  

Start 3/16/20 at 03:30;  Stop 3/18/20 at 03:29;  Status DC


Hydromorphone HCl (Dilaudid) 0.5 mg 1X  ONCE IV  Last administered on 3/16/20at 

03:55;  Start 3/16/20 at 04:30;  Stop 3/16/20 at 04:32;  Status DC


Ondansetron HCl (Zofran) 4 mg PRN Q8HRS  PRN IV NAUSEA/VOMITING 1ST CHOICE;  

Start 3/16/20 at 05:00;  Stop 3/16/20 at 09:27;  Status DC


Morphine Sulfate (Morphine Sulfate) 2 mg PRN Q2HR  PRN IV SEVERE PAIN 7-10 Last 

administered on 3/17/20at 12:26;  Start 3/16/20 at 05:00;  Stop 3/17/20 at 

14:15;  Status DC


Sodium Chloride 1,000 ml @  125 mls/hr Q8H IV  Last administered on 3/16/20at 

20:56;  Start 3/16/20 at 05:00;  Stop 3/17/20 at 04:59;  Status DC


Hydromorphone HCl (Dilaudid) 0.5 mg PRN Q3HRS  PRN IV SEVERE PAIN 7-10 Last 

administered on 3/17/20at 10:06;  Start 3/16/20 at 05:00;  Stop 3/17/20 at 

12:01;  Status DC


Piperacillin Sod/ Tazobactam Sod 4.5 gm/Sodium Chloride 100 ml @  200 mls/hr 1X 

ONCE IV  Last administered on 3/16/20at 05:44;  Start 3/16/20 at 06:00;  Stop 

3/16/20 at 06:29;  Status DC


Ondansetron HCl (Zofran) 4 mg PRN Q4HRS  PRN IV NAUSEA/VOMITING 1ST CHOICE Last 

administered on 6/14/20at 01:35;  Start 3/16/20 at 09:30


Insulin Human Lispro (HumaLOG) 0-9 UNITS Q6HRS SQ  Last administered on 

6/14/20at 06:06;  Start 3/16/20 at 09:30


Dextrose (Dextrose 50%-Water Syringe) 12.5 gm PRN Q15MIN  PRN IV SEE COMMENTS;  

Start 3/16/20 at 09:30


Pantoprazole Sodium (PROTONIX VIAL for IV PUSH) 40 mg DAILYAC IVP  Last 

administered on 6/14/20at 08:58;  Start 3/16/20 at 11:30


Prochlorperazine Edisylate (Compazine) 10 mg PRN Q6HRS  PRN IV NAUSEA/VOMITING, 

2nd CHOICE Last administered on 6/10/20at 14:33;  Start 3/16/20 at 17:45


Atenolol (Tenormin) 100 mg DAILY PO ;  Start 3/17/20 at 09:00;  Stop 3/16/20 at 

20:08;  Status DC


Metoprolol Tartrate (Lopressor Vial) 2.5 mg Q6HRS IVP  Last administered on 

3/17/20at 05:51;  Start 3/16/20 at 20:15;  Stop 3/17/20 at 10:02;  Status DC


Metoprolol Tartrate (Lopressor Vial) 5 mg Q6HRS IVP  Last administered on 

3/26/20at 00:12;  Start 3/17/20 at 10:15;  Stop 3/28/20 at 08:48;  Status DC


Hydromorphone HCl (Dilaudid) 1 mg PRN Q3HRS  PRN IV SEVERE PAIN 7-10 Last 

administered on 3/23/20at 05:13;  Start 3/17/20 at 12:00;  Stop 3/31/20 at 

00:25;  Status DC


Lidocaine HCl (Buffered Lidocaine 1%) 3 ml STK-MED ONCE .ROUTE ;  Start 3/17/20 

at 12:55;  Stop 3/17/20 at 12:56;  Status DC


Albumin Human 500 ml @  125 mls/hr 1X  ONCE IV  Last administered on 3/17/20at 

14:33;  Start 3/17/20 at 14:30;  Stop 3/17/20 at 18:32;  Status DC


Norepinephrine Bitartrate 8 mg/ Dextrose 258 ml @  17.299 mls/ hr CONT  PRN IV 

PER PROTOCOL Last administered on 4/14/20at 12:48;  Start 3/17/20 at 15:30;  

Stop 4/17/20 at 09:19;  Status DC


Sodium Chloride 1,000 ml @  125 mls/hr Q8H IV  Last administered on 3/17/20at 

21:04;  Start 3/17/20 at 16:00;  Stop 3/18/20 at 02:42;  Status DC


Albumin Human 500 ml @  125 mls/hr PRN BID  PRN IV After every 2L NSS & BP < 

90mm Last administered on 6/6/20at 11:40;  Start 3/17/20 at 16:00


Iohexol (Omnipaque 300 Mg/ml) 60 ml 1X  ONCE IV  Last administered on 3/17/20at 

17:20;  Start 3/17/20 at 17:00;  Stop 3/17/20 at 17:01;  Status DC


Info (CONTRAST GIVEN -- Rx MONITORING) 1 each PRN DAILY  PRN MC SEE COMMENTS;  

Start 3/17/20 at 17:00;  Stop 3/19/20 at 16:59;  Status DC


Meropenem 1 gm/ Sodium Chloride 100 ml @  200 mls/hr Q8HRS IV  Last administered

on 3/18/20at 05:45;  Start 3/17/20 at 20:00;  Stop 3/18/20 at 08:48;  Status DC


Furosemide (Lasix) 40 mg 1X  ONCE IVP  Last administered on 3/17/20at 22:12;  

Start 3/17/20 at 22:30;  Stop 3/17/20 at 22:31;  Status DC


Calcium Chloride 1000 mg/Sodium Chloride 110 ml @  220 mls/hr 1X  ONCE IV  Last 

administered on 3/17/20at 22:11;  Start 3/17/20 at 22:30;  Stop 3/17/20 at 

22:59;  Status DC


Albuterol Sulfate (Ventolin Neb Soln) 2.5 mg 1X  ONCE NEB  Last administered on 

3/18/20at 00:56;  Start 3/17/20 at 22:30;  Stop 3/17/20 at 22:31;  Status DC


Insulin Human Regular (HumuLIN R VIAL) 5 unit 1X  ONCE IV  Last administered on 

3/17/20at 22:14;  Start 3/17/20 at 22:30;  Stop 3/17/20 at 22:31;  Status DC


Magnesium Sulfate 50 ml @ 25 mls/hr 1X  ONCE IV  Last administered on 3/18/20at 

02:57;  Start 3/18/20 at 03:00;  Stop 3/18/20 at 04:59;  Status DC


Calcium Gluconate 1000 mg/Sodium Chloride 110 ml @  220 mls/hr 1X  ONCE IV  Last

administered on 3/18/20at 02:46;  Start 3/18/20 at 03:00;  Stop 3/18/20 at 

03:29;  Status DC


Sodium Chloride 1,000 ml @  200 mls/hr Q5H IV  Last administered on 3/18/20at 

02:46;  Start 3/18/20 at 03:00;  Stop 3/18/20 at 10:21;  Status DC


Calcium Gluconate 1000 mg/Sodium Chloride 110 ml @  220 mls/hr 1X  ONCE IV  Last

administered on 3/18/20at 03:21;  Start 3/18/20 at 03:30;  Stop 3/18/20 at 

03:59;  Status DC


Sodium Bicarbonate 50 meq/Sodium Chloride 1,050 ml @  75 mls/hr Q14H IV  Last 

administered on 3/22/20at 21:10;  Start 3/18/20 at 07:30;  Stop 3/23/20 at 

10:28;  Status DC


Calcium Gluconate 2000 mg/Sodium Chloride 120 ml @  220 mls/hr 1X  ONCE IV  Last

administered on 3/18/20at 09:05;  Start 3/18/20 at 07:30;  Stop 3/18/20 at 

08:02;  Status DC


Lidocaine HCl (Xylocaine-Mpf 1% 2ml Vial) 2 ml STK-MED ONCE .ROUTE ;  Start 

3/18/20 at 08:47;  Stop 3/18/20 at 08:47;  Status DC


Meropenem 500 mg/ Sodium Chloride 50 ml @  100 mls/hr Q12HR IV  Last a

dministered on 3/23/20at 21:01;  Start 3/18/20 at 18:00;  Stop 3/24/20 at 07:58;

 Status DC


Lidocaine HCl (Buffered Lidocaine 1%) 3 ml STK-MED ONCE .ROUTE ;  Start 3/18/20 

at 09:46;  Stop 3/18/20 at 09:46;  Status DC


Lidocaine HCl (Buffered Lidocaine 1%) 6 ml 1X  ONCE INJ  Last administered on 

3/18/20at 10:26;  Start 3/18/20 at 10:15;  Stop 3/18/20 at 10:16;  Status DC


Info (Tpn Per Pharmacy) 1 each PRN DAILY  PRN MC SEE COMMENTS Last administered 

on 6/13/20at 09:33;  Start 3/18/20 at 12:00


Sodium Chloride 1,000 ml @  1,000 mls/hr Q1H PRN IV hypotension;  Start 3/18/20 

at 12:07;  Stop 3/18/20 at 18:06;  Status DC


Diphenhydramine HCl (Benadryl) 25 mg 1X PRN  PRN IV ITCHING;  Start 3/18/20 at 

12:15;  Stop 3/19/20 at 12:14;  Status DC


Diphenhydramine HCl (Benadryl) 25 mg 1X PRN  PRN IV ITCHING;  Start 3/18/20 at 

12:15;  Stop 3/19/20 at 12:14;  Status DC


Sodium Chloride 1,000 ml @  400 mls/hr Q2H30M PRN IV PATENCY;  Start 3/18/20 at 

12:07;  Stop 3/19/20 at 00:06;  Status DC


Info (PHARMACY MONITORING -- do not chart) 1 each PRN DAILY  PRN MC SEE 

COMMENTS;  Start 3/18/20 at 12:15;  Stop 3/20/20 at 08:13;  Status DC


Sodium Chloride 90 meq/Calcium Gluconate 10 meq/ Multivitamins 10 ml/Chromium/ 

Copper/Manganese/ Seleni/Zn 1 ml/ Total Parenteral Nutrition/Amino Acids/D

extrose/ Fat Emulsion Intravenous 55.005 ml  @ 2.292 mls/hr TPN  CONT IV ;  

Start 3/18/20 at 22:00;  Stop 3/18/20 at 12:33;  Status DC


Info (Tpn Per Pharmacy) 1 each PRN DAILY  PRN MC SEE COMMENTS;  Start 3/18/20 at

12:30;  Status UNV


Sodium Chloride 90 meq/Calcium Gluconate 10 meq/ Multivitamins 10 ml/Chromium/ 

Copper/Manganese/ Seleni/Zn 0.5 ml/ Total Parenteral Nutrition/Amino A

cids/Dextrose/ Fat Emulsion Intravenous 1,512 ml @  63 mls/hr TPN  CONT IV  Last

administered on 3/18/20at 22:06;  Start 3/18/20 at 22:00;  Stop 3/19/20 at 

21:59;  Status DC


Calcium Carbonate/ Glycine (Tums) 500 mg PRN AFTMEALHC  PRN PO INDIGESTION;  

Start 3/18/20 at 17:45;  Stop 5/13/20 at 10:25;  Status DC


Calcium Gluconate (Calcium Gluconate) 2,000 mg 1X  ONCE IVP  Last administered 

on 3/19/20at 02:19;  Start 3/19/20 at 02:15;  Stop 3/19/20 at 02:16;  Status DC


Calcium Chloride 3000 mg/Sodium Chloride 1,030 ml @  50 mls/hr [id number] IV  Last 

administered on 3/21/20at 02:17;  Start 3/19/20 at 08:00;  Stop 3/21/20 at 

15:23;  Status DC


Lorazepam (Ativan Inj) 1 mg PRN Q4HRS  PRN IVP ANXIETY / AGITATION, 2nd choic 

Last administered on 4/17/20at 03:51;  Start 3/19/20 at 09:00;  Stop 4/17/20 at 

09:19;  Status DC


Sodium Chloride 1,000 ml @  1,000 mls/hr Q1H PRN IV hypotension;  Start 3/19/20 

at 08:56;  Stop 3/19/20 at 14:55;  Status DC


Albumin Human 200 ml @  200 mls/hr 1X PRN  PRN IV Hypotension;  Start 3/19/20 at

09:00;  Stop 3/19/20 at 14:59;  Status DC


Diphenhydramine HCl (Benadryl) 25 mg 1X PRN  PRN IV ITCHING;  Start 3/19/20 at 

09:00;  Stop 3/20/20 at 08:59;  Status DC


Diphenhydramine HCl (Benadryl) 25 mg 1X PRN  PRN IV ITCHING;  Start 3/19/20 at 

09:00;  Stop 3/20/20 at 08:59;  Status DC


Sodium Chloride 1,000 ml @  400 mls/hr Q2H30M PRN IV PATENCY;  Start 3/19/20 at 

08:56;  Stop 3/19/20 at 20:55;  Status DC


Info (PHARMACY MONITORING -- do not chart) 1 each PRN DAILY  PRN MC SEE 

COMMENTS;  Start 3/19/20 at 09:00;  Status UNV


Info (PHARMACY MONITORING -- do not chart) 1 each PRN DAILY  PRN MC SEE 

COMMENTS;  Start 3/19/20 at 09:00;  Stop 3/20/20 at 08:13;  Status DC


Digoxin (Lanoxin) 500 mcg 1X  ONCE IV  Last administered on 3/19/20at 10:04;  

Start 3/19/20 at 10:00;  Stop 3/19/20 at 10:01;  Status DC


Digoxin (Lanoxin) 125 mcg 1X  ONCE IV  Last administered on 3/19/20at 17:10;  

Start 3/19/20 at 18:00;  Stop 3/19/20 at 18:01;  Status DC


Magnesium Sulfate 100 ml @  25 mls/hr 1X  ONCE IV  Last administered on 

3/19/20at 12:48;  Start 3/19/20 at 13:00;  Stop 3/19/20 at 16:59;  Status DC


Sodium Chloride 90 meq/Magnesium Sulfate 10 meq/ Calcium Gluconate 20 meq/ 

Multivitamins 10 ml/Chromium/ Copper/Manganese/ Seleni/Zn 0.5 ml/ Total 

Parenteral Nutrition/Amino Acids/Dextrose/ Fat Emulsion Intravenous 1,512 ml @  

63 mls/hr TPN  CONT IV  Last administered on 3/19/20at 22:25;  Start 3/19/20 at 

22:00;  Stop 3/20/20 at 21:59;  Status DC


Sodium Chloride 1,000 ml @  1,000 mls/hr Q1H PRN IV hypotension;  Start 3/20/20 

at 08:05;  Stop 3/20/20 at 14:04;  Status DC


Albumin Human 200 ml @  200 mls/hr 1X  ONCE IV  Last administered on 3/20/20at 

08:57;  Start 3/20/20 at 08:15;  Stop 3/20/20 at 09:14;  Status DC


Diphenhydramine HCl (Benadryl) 25 mg 1X PRN  PRN IV ITCHING;  Start 3/20/20 at 

08:15;  Stop 3/21/20 at 08:14;  Status DC


Diphenhydramine HCl (Benadryl) 25 mg 1X PRN  PRN IV ITCHING;  Start 3/20/20 at 

08:15;  Stop 3/21/20 at 08:14;  Status DC


Sodium Chloride 1,000 ml @  400 mls/hr Q2H30M PRN IV PATENCY;  Start 3/20/20 at 

08:05;  Stop 3/20/20 at 20:04;  Status DC


Info (PHARMACY MONITORING -- do not chart) 1 each PRN DAILY  PRN MC SEE 

COMMENTS;  Start 3/20/20 at 08:15;  Stop 3/24/20 at 07:57;  Status DC


Sodium Chloride 90 meq/Potassium Chloride 15 meq/ Potassium Phosphate 10 mmol/ 

Magnesium Sulfate 10 meq/Calcium Gluconate 20 meq/ Multivitamins 10 ml/Chromium/

Copper/Manganese/ Seleni/Zn 0.5 ml/ Total Parenteral Nutrition/Amino 

Acids/Dextrose/ Fat Emulsion Intravenous 1,512 ml @  63 mls/hr TPN  CONT IV  

Last administered on 3/20/20at 21:01;  Start 3/20/20 at 22:00;  Stop 3/21/20 at 

21:59;  Status DC


Potassium Chloride/Water 100 ml @  100 mls/hr 1X  ONCE IV  Last administered on 

3/20/20at 14:09;  Start 3/20/20 at 14:00;  Stop 3/20/20 at 14:59;  Status DC


Benzocaine (Hurricaine One) 1 spray 1X  ONCE MM  Last administered on 3/20/20at 

16:38;  Start 3/20/20 at 14:30;  Stop 3/20/20 at 14:31;  Status DC


Lidocaine HCl (Glydo (Lidocaine) Jelly) 1 thomas 1X  ONCE MM  Last administered on 

3/20/20at 16:38;  Start 3/20/20 at 14:30;  Stop 3/20/20 at 14:31;  Status DC


Linezolid/Dextrose 300 ml @  300 mls/hr Q12HR IV  Last administered on 3/26/20at

21:04;  Start 3/20/20 at 20:00;  Stop 3/27/20 at 07:50;  Status DC


Acetaminophen (Tylenol) 650 mg PRN Q6HRS  PRN PO MILD PAIN / TEMP;  Start 

3/21/20 at 03:30;  Stop 3/21/20 at 03:36;  Status DC


Acetaminophen (Tylenol) 650 mg PRN Q6HRS  PRN PEG MILD PAIN / TEMP Last 

administered on 4/16/20at 19:56;  Start 3/21/20 at 03:36;  Stop 5/13/20 at 

10:25;  Status DC


Sodium Chloride 1,000 ml @  1,000 mls/hr Q1H PRN IV hypotension;  Start 3/21/20 

at 07:50;  Stop 3/21/20 at 13:49;  Status DC


Albumin Human 200 ml @  200 mls/hr 1X PRN  PRN IV Hypotension;  Start 3/21/20 at

08:00;  Stop 3/21/20 at 13:59;  Status DC


Sodium Chloride (Normal Saline Flush) 10 ml 1X PRN  PRN IV AP catheter pack;  

Start 3/21/20 at 08:00;  Stop 3/22/20 at 07:59;  Status DC


Sodium Chloride (Normal Saline Flush) 10 ml 1X PRN  PRN IV  catheter pack;  

Start 3/21/20 at 08:00;  Stop 3/22/20 at 07:59;  Status DC


Sodium Chloride 1,000 ml @  400 mls/hr Q2H30M PRN IV PATENCY;  Start 3/21/20 at 

07:50;  Stop 3/21/20 at 19:49;  Status DC


Info (PHARMACY MONITORING -- do not chart) 1 each PRN DAILY  PRN MC SEE 

COMMENTS;  Start 3/21/20 at 08:00;  Status UNV


Info (PHARMACY MONITORING -- do not chart) 1 each PRN DAILY  PRN MC SEE 

COMMENTS;  Start 3/21/20 at 08:00;  Stop 3/23/20 at 08:25;  Status DC


Sodium Chloride 90 meq/Potassium Chloride 15 meq/ Potassium Phosphate 10 mmol/ 

Magnesium Sulfate 10 meq/Calcium Gluconate 20 meq/ Multivitamins 10 ml/Chromium/

Copper/Manganese/ Seleni/Zn 0.5 ml/ Total Parenteral Nutrition/Amino 

Acids/Dextrose/ Fat Emulsion Intravenous 1,512 ml @  63 mls/hr TPN  CONT IV  

Last administered on 3/21/20at 20:57;  Start 3/21/20 at 22:00;  Stop 3/22/20 at 

21:59;  Status DC


Sodium Chloride 90 meq/Potassium Chloride 15 meq/ Potassium Phosphate 15 mmol/ 

Magnesium Sulfate 10 meq/Calcium Gluconate 20 meq/ Multivitamins 10 ml/Chromium/

Copper/Manganese/ Seleni/Zn 0.5 ml/ Total Parenteral Nutrition/Amino 

Acids/Dextrose/ Fat Emulsion Intravenous 1,512 ml @  63 mls/hr TPN  CONT IV ;  

Start 3/22/20 at 22:00;  Stop 3/22/20 at 14:16;  Status DC


Sodium Chloride 90 meq/Potassium Chloride 15 meq/ Potassium Phosphate 15 mmol/ 

Magnesium Sulfate 10 meq/Calcium Gluconate 20 meq/ Multivitamins 10 ml/Chromium/

Copper/Manganese/ Seleni/Zn 0.5 ml/ Total Parenteral Nutrition/Amino 

Acids/Dextrose/ Fat Emulsion Intravenous 1,200 ml @  50 mls/hr TPN  CONT IV ;  

Start 3/22/20 at 22:00;  Stop 3/22/20 at 14:17;  Status DC


Sodium Chloride 90 meq/Potassium Chloride 15 meq/ Potassium Phosphate 10 mmol/ 

Magnesium Sulfate 10 meq/Calcium Gluconate 20 meq/ Multivitamins 10 ml/Chromium/

Copper/Manganese/ Seleni/Zn 0.5 ml/ Total Parenteral Nutrition/Amino 

Acids/Dextrose/ Fat Emulsion Intravenous 1,200 ml @  50 mls/hr TPN  CONT IV  

Last administered on 3/22/20at 23:29;  Start 3/22/20 at 22:00;  Stop 3/23/20 at 

21:59;  Status DC


Sodium Chloride 1,000 ml @  1,000 mls/hr Q1H PRN IV hypotension;  Start 3/23/20 

at 07:28;  Stop 3/23/20 at 13:27;  Status DC


Albumin Human 200 ml @  200 mls/hr 1X  ONCE IV  Last administered on 3/23/20at 

08:51;  Start 3/23/20 at 07:30;  Stop 3/23/20 at 08:29;  Status DC


Diphenhydramine HCl (Benadryl) 25 mg 1X PRN  PRN IV ITCHING;  Start 3/23/20 at 

07:30;  Stop 3/24/20 at 07:29;  Status DC


Diphenhydramine HCl (Benadryl) 25 mg 1X PRN  PRN IV ITCHING;  Start 3/23/20 at 

07:30;  Stop 3/24/20 at 07:29;  Status DC


Sodium Chloride 1,000 ml @  400 mls/hr Q2H30M PRN IV PATENCY;  Start 3/23/20 at 

07:28;  Stop 3/23/20 at 19:27;  Status DC


Info (PHARMACY MONITORING -- do not chart) 1 each PRN DAILY  PRN MC SEE 

COMMENTS;  Start 3/23/20 at 07:30;  Stop 4/3/20 at 13:01;  Status DC


Metronidazole 100 ml @  100 mls/hr Q6HRS IV  Last administered on 4/8/20at 

06:26;  Start 3/23/20 at 08:30;  Stop 4/8/20 at 09:58;  Status DC


Micafungin Sodium 100 mg/Dextrose 100 ml @  100 mls/hr Q24H IV  Last 

administered on 4/30/20at 08:18;  Start 3/23/20 at 09:00;  Stop 4/30/20 at 

20:58;  Status DC


Propofol 0 ml @ As Directed STK-MED ONCE IV ;  Start 3/23/20 at 07:53;  Stop 

3/23/20 at 07:53;  Status DC


Etomidate (Amidate) 20 mg STK-MED ONCE IV ;  Start 3/23/20 at 07:53;  Stop 

3/23/20 at 07:54;  Status DC


Midazolam HCl (Versed) 5 mg STK-MED ONCE .ROUTE ;  Start 3/23/20 at 07:57;  Stop

3/23/20 at 07:57;  Status DC


Fentanyl Citrate 30 ml @ 0 mls/hr CONT  PRN IV SEE PROTOCOL Last administered on

4/17/20at 06:12;  Start 3/23/20 at 08:15;  Stop 4/17/20 at 09:19;  Status DC


Artificial Tears (Artificial Tears) 1 drop PRN Q1HR  PRN OU DRY EYE, 1st choice;

 Start 3/23/20 at 08:15;  Stop 4/29/20 at 05:31;  Status DC


Midazolam HCl 50 mg/Sodium Chloride 50 ml @ 0 mls/hr CONT  PRN IV SEE PROTOCOL 

Last administered on 3/26/20at 22:39;  Start 3/23/20 at 08:15;  Stop 3/28/20 at 

15:59;  Status DC


Etomidate (Amidate) 8 mg 1X  ONCE IV  Last administered on 3/23/20at 08:33;  

Start 3/23/20 at 08:30;  Stop 3/23/20 at 08:31;  Status DC


Succinylcholine Chloride (Anectine) 120 mg 1X  ONCE IV  Last administered on 

3/23/20at 08:34;  Start 3/23/20 at 08:30;  Stop 3/23/20 at 08:31;  Status DC


Midazolam HCl (Versed) 5 mg 1X  ONCE IV ;  Start 3/23/20 at 08:30;  Stop 3/23/20

at 08:31;  Status DC


Potassium Chloride 15 meq/ Bicarbonate Dialysis Soln w/ out KCl 5,007.5 ml  @ 

1,000 mls/ hr Q5H1M IV  Last administered on 3/24/20at 11:11;  Start 3/23/20 at 

12:00;  Stop 3/24/20 at 11:15;  Status DC


Potassium Chloride 15 meq/ Bicarbonate Dialysis Soln w/ out KCl 5,007.5 ml  @ 

1,000 mls/ hr Q5H1M IV  Last administered on 3/24/20at 11:12;  Start 3/23/20 at 

12:00;  Stop 3/24/20 at 11:17;  Status DC


Potassium Chloride 15 meq/ Bicarbonate Dialysis Soln w/ out KCl 5,007.5 ml  @ 

1,000 mls/ hr Q5H1M IV  Last administered on 3/24/20at 11:11;  Start 3/23/20 at 

12:00;  Stop 3/24/20 at 11:19;  Status DC


Sodium Chloride 90 meq/Potassium Chloride 15 meq/ Potassium Phosphate 10 mmol/ 

Magnesium Sulfate 10 meq/Calcium Gluconate 20 meq/ Multivitamins 10 ml/Chromium/

Copper/Manganese/ Seleni/Zn 0.5 ml/ Total Parenteral Nutrition/Amino 

Acids/Dextrose/ Fat Emulsion Intravenous 1,400 ml @  58.333 mls/ hr TPN  CONT IV

 Last administered on 3/23/20at 21:42;  Start 3/23/20 at 22:00;  Stop 3/24/20 at

21:59;  Status DC


Heparin Sodium (Porcine) (Heparin Sodium) 5,000 unit Q8HRS SQ  Last administered

on 3/28/20at 05:55;  Start 3/23/20 at 15:00;  Stop 3/28/20 at 13:28;  Status DC


Meropenem 500 mg/ Sodium Chloride 50 ml @  100 mls/hr Q6HRS IV  Last 

administered on 3/25/20at 06:00;  Start 3/24/20 at 09:00;  Stop 3/25/20 at 

07:29;  Status DC


Potassium Phosphate 20 mmol/ Sodium Chloride 106.6667 ml @  51.667 m... 1X  ONCE

IV  Last administered on 3/24/20at 11:22;  Start 3/24/20 at 10:15;  Stop 3/24/20

at 12:18;  Status DC


Acetaminophen (Tylenol Supp) 650 mg PRN Q6HRS  PRN WA MILD PAIN / TEMP > 100.3'F

Last administered on 6/10/20at 22:16;  Start 3/24/20 at 10:30


Potassium Chloride/Water 100 ml @  100 mls/hr Q1H IV  Last administered on 

3/24/20at 12:12;  Start 3/24/20 at 11:00;  Stop 3/24/20 at 12:59;  Status DC


Potassium Chloride 20 meq/ Bicarbonate Dialysis Soln w/ out KCl 5,010 ml @  

1,000 mls/hr Q5H1M IV  Last administered on 3/25/20at 08:48;  Start 3/24/20 at 

12:00;  Stop 3/25/20 at 13:03;  Status DC


Potassium Chloride 20 meq/ Bicarbonate Dialysis Soln w/ out KCl 5,010 ml @  

1,000 mls/hr Q5H1M IV  Last administered on 3/29/20at 14:52;  Start 3/24/20 at 

11:30;  Stop 3/29/20 at 19:59;  Status DC


Potassium Chloride 20 meq/ Bicarbonate Dialysis Soln w/ out KCl 5,010 ml @  

1,000 mls/hr Q5H1M IV  Last administered on 3/29/20at 14:53;  Start 3/24/20 at 

11:30;  Stop 3/29/20 at 19:59;  Status DC


Sodium Chloride 90 meq/Potassium Chloride 15 meq/ Potassium Phosphate 15 mmol/ 

Magnesium Sulfate 10 meq/Calcium Gluconate 15 meq/ Multivitamins 10 ml/Chromium/

Copper/Manganese/ Seleni/Zn 0.5 ml/ Total Parenteral Nutrition/Amino 

Acids/Dextrose/ Fat Emulsion Intravenous 1,400 ml @  58.333 mls/ hr TPN  CONT IV

 Last administered on 3/24/20at 22:17;  Start 3/24/20 at 22:00;  Stop 3/25/20 at

21:59;  Status DC


Cefepime HCl (Maxipime) 2 gm Q12HR IVP  Last administered on 4/7/20at 20:56;  

Start 3/25/20 at 09:00;  Stop 4/8/20 at 09:58;  Status DC


Daptomycin 500 mg/ Sodium Chloride 50 ml @  100 mls/hr Q48H IV  Last 

administered on 4/10/20at 09:57;  Start 3/25/20 at 08:30;  Stop 4/10/20 at 

10:07;  Status DC


Lidocaine HCl (Buffered Lidocaine 1%) 3 ml 1X  ONCE INJ  Last administered on 

3/25/20at 10:27;  Start 3/25/20 at 10:30;  Stop 3/25/20 at 10:31;  Status DC


Potassium Phosphate 20 mmol/ Sodium Chloride 106.6667 ml @  51.667 m... 1X  ONCE

IV  Last administered on 3/25/20at 12:51;  Start 3/25/20 at 13:00;  Stop 3/25/20

at 15:03;  Status DC


Sodium Chloride 90 meq/Potassium Chloride 15 meq/ Potassium Phosphate 18 mmol/ 

Magnesium Sulfate 8 meq/Calcium Gluconate 15 meq/ Multivitamins 10 ml/Chromium/ 

Copper/Manganese/ Seleni/Zn 0.5 ml/ Total Parenteral Nutrition/Amino 

Acids/Dextrose/ Fat Emulsion Intravenous 1,400 ml @  58.333 mls/ hr TPN  CONT IV

 Last administered on 3/25/20at 22:16;  Start 3/25/20 at 22:00;  Stop 3/26/20 at

21:59;  Status DC


Potassium Chloride 20 meq/ Bicarbonate Dialysis Soln w/ out KCl 5,010 ml @  

1,000 mls/hr Q5H1M IV  Last administered on 3/29/20at 14:54;  Start 3/25/20 at 

16:00;  Stop 3/29/20 at 19:59;  Status DC


Multi-Ingred Cream/Lotion/Oil/ Oint (Artificial Tears Eye Ointment) 1 thomas PRN 

Q1HR  PRN OU DRY EYE, 2nd choice Last administered on 4/13/20at 08:19;  Start 

3/25/20 at 17:30;  Stop 6/3/20 at 14:39;  Status DC


Sodium Chloride 90 meq/Potassium Chloride 15 meq/ Potassium Phosphate 18 mmol/ 

Magnesium Sulfate 8 meq/Calcium Gluconate 15 meq/ Multivitamins 10 ml/Chromium/ 

Copper/Manganese/ Seleni/Zn 0.5 ml/ Total Parenteral Nutrition/Amino 

Acids/Dextrose/ Fat Emulsion Intravenous 1,400 ml @  58.333 mls/ hr TPN  CONT IV

 Last administered on 3/26/20at 22:00;  Start 3/26/20 at 22:00;  Stop 3/27/20 at

21:59;  Status DC


Albumin Human 500 ml @  125 mls/hr 1X  ONCE IV ;  Start 3/26/20 at 14:15;  Stop 

3/26/20 at 18:14;  Status DC


Sodium Chloride 90 meq/Potassium Chloride 15 meq/ Potassium Phosphate 18 mmol/ 

Magnesium Sulfate 8 meq/Calcium Gluconate 15 meq/ Multivitamins 10 ml/Chromium/ 

Copper/Manganese/ Seleni/Zn 0.5 ml/ Insulin Human Regular 10 unit/ Total 

Parenteral Nutrition/Amino Acids/Dextrose/ Fat Emulsion Intravenous 1,400 ml @  

58.333 mls/ hr TPN  CONT IV  Last administered on 3/27/20at 21:43;  Start 

3/27/20 at 22:00;  Stop 3/28/20 at 21:59;  Status DC


Lidocaine HCl (Buffered Lidocaine 1%) 3 ml STK-MED ONCE .ROUTE ;  Start 3/25/20 

at 10:00;  Stop 3/27/20 at 13:57;  Status DC


Midazolam HCl 100 mg/Sodium Chloride 100 ml @ 7 mls/hr CONT  PRN IV SEE PROTOCOL

Last administered on 4/8/20at 15:35;  Start 3/28/20 at 16:00;  Stop 6/3/20 at 

14:38;  Status DC


Sodium Chloride 90 meq/Potassium Chloride 15 meq/ Potassium Phosphate 18 mmol/ 

Magnesium Sulfate 8 meq/Calcium Gluconate 15 meq/ Multivitamins 10 ml/Chromium/ 

Copper/Manganese/ Seleni/Zn 0.5 ml/ Insulin Human Regular 15 unit/ Total 

Parenteral Nutrition/Amino Acids/Dextrose/ Fat Emulsion Intravenous 1,400 ml @  

58.333 mls/ hr TPN  CONT IV  Last administered on 3/28/20at 20:34;  Start 

3/28/20 at 22:00;  Stop 3/29/20 at 21:59;  Status DC


Info ([facility] Electrolyte Protocol) 1 ea CONT PRN  PRN MC PER PROTOCOL;  Start 

3/29/20 at 13:15


Sodium Chloride 90 meq/Potassium Chloride 15 meq/ Potassium Phosphate 18 mmol/ 

Magnesium Sulfate 8 meq/Calcium Gluconate 15 meq/ Multivitamins 10 ml/Chromium/ 

Copper/Manganese/ Seleni/Zn 0.5 ml/ Insulin Human Regular 15 unit/ Total 

Parenteral Nutrition/Amino Acids/Dextrose/ Fat Emulsion Intravenous 1,400 ml @  

58.333 mls/ hr TPN  CONT IV  Last administered on 3/29/20at 22:05;  Start 

3/29/20 at 22:00;  Stop 3/30/20 at 21:59;  Status DC


Potassium Chloride 15 meq/ Bicarbonate Dialysis Soln w/ out KCl 5,007.5 ml  @ 

1,000 mls/ hr Q5H1M IV  Last administered on 4/1/20at 18:14;  Start 3/29/20 at 

20:00;  Stop 4/2/20 at 13:08;  Status DC


Potassium Chloride 15 meq/ Bicarbonate Dialysis Soln w/ out KCl 5,007.5 ml  @ 

1,000 mls/ hr Q5H1M IV  Last administered on 4/1/20at 18:14;  Start 3/29/20 at 

20:00;  Stop 4/2/20 at 13:08;  Status DC


Potassium Chloride 15 meq/ Bicarbonate Dialysis Soln w/ out KCl 5,007.5 ml  @ 

1,000 mls/ hr Q5H1M IV  Last administered on 4/1/20at 18:14;  Start 3/29/20 at 

20:00;  Stop 4/2/20 at 13:08;  Status DC


Iohexol (Omnipaque 240 Mg/ml) 30 ml 1X  ONCE PO  Last administered on 3/30/20at 

11:30;  Start 3/30/20 at 11:30;  Stop 3/30/20 at 11:33;  Status DC


Info (CONTRAST GIVEN -- Rx MONITORING) 1 each PRN DAILY  PRN MC SEE COMMENTS;  

Start 3/30/20 at 11:45;  Stop 4/1/20 at 11:44;  Status DC


Sodium Chloride 90 meq/Potassium Chloride 15 meq/ Potassium Phosphate 18 mmol/ 

Magnesium Sulfate 8 meq/Calcium Gluconate 15 meq/ Multivitamins 10 ml/Chromium/ 

Copper/Manganese/ Seleni/Zn 0.5 ml/ Insulin Human Regular 15 unit/ Total 

Parenteral Nutrition/Amino Acids/Dextrose/ Fat Emulsion Intravenous 1,400 ml @  

58.333 mls/ hr TPN  CONT IV  Last administered on 3/30/20at 21:47;  Start 

3/30/20 at 22:00;  Stop 3/31/20 at 21:59;  Status DC


Sodium Chloride 90 meq/Potassium Chloride 15 meq/ Potassium Phosphate 18 mmol/ 

Magnesium Sulfate 8 meq/Calcium Gluconate 15 meq/ Multivitamins 10 ml/Chromium/ 

Copper/Manganese/ Seleni/Zn 0.5 ml/ Insulin Human Regular 20 unit/ Total 

Parenteral Nutrition/Amino Acids/Dextrose/ Fat Emulsion Intravenous 1,400 ml @  

58.333 mls/ hr TPN  CONT IV  Last administered on 3/31/20at 21:36;  Start 

3/31/20 at 22:00;  Stop 4/1/20 at 21:59;  Status DC


Alteplase, Recombinant (Cathflo For Central Catheter Clearance) 1 mg 1X  ONCE 

INT CAT  Last administered on 3/31/20at 20:03;  Start 3/31/20 at 19:30;  Stop 

3/31/20 at 19:46;  Status DC


Alteplase, Recombinant (Cathflo For Central Catheter Clearance) 1 mg 1X  ONCE 

INT CAT  Last administered on 3/31/20at 22:05;  Start 3/31/20 at 22:00;  Stop 

3/31/20 at 22:01;  Status DC


Sodium Chloride 90 meq/Potassium Chloride 15 meq/ Potassium Phosphate 18 mmol/ 

Magnesium Sulfate 8 meq/Calcium Gluconate 15 meq/ Multivitamins 10 ml/Chromium/ 

Copper/Manganese/ Seleni/Zn 0.5 ml/ Insulin Human Regular 20 unit/ Total 

Parenteral Nutrition/Amino Acids/Dextrose/ Fat Emulsion Intravenous 1,400 ml @  

58.333 mls/ hr TPN  CONT IV  Last administered on 4/1/20at 21:30;  Start 4/1/20 

at 22:00;  Stop 4/2/20 at 21:59;  Status DC


Dexmedetomidine HCl 400 mcg/ Sodium Chloride 100 ml @ 0 mls/hr CONT  PRN IV 

ANXIETY / AGITATION Last administered on 5/30/20at 12:57;  Start 4/2/20 at 

08:15;  Stop 5/30/20 at 18:31;  Status DC


Sodium Chloride 500 ml @  500 mls/hr 1X PRN  PRN IV ELEVATED BP, SEE COMMENTS;  

Start 4/2/20 at 08:15


Atropine Sulfate (ATROPINE 0.5mg SYRINGE) 0.5 mg PRN Q5MIN  PRN IV SEE COMMENTS;

 Start 4/2/20 at 08:15


Furosemide (Lasix) 20 mg 1X  ONCE IVP  Last administered on 4/2/20at 08:19;  

Start 4/2/20 at 08:15;  Stop 4/2/20 at 08:16;  Status DC


Lidocaine HCl (Buffered Lidocaine 1%) 3 ml STK-MED ONCE .ROUTE ;  Start 4/2/20 

at 08:39;  Stop 4/2/20 at 08:39;  Status DC


Lidocaine HCl (Buffered Lidocaine 1%) 6 ml 1X  ONCE INJ  Last administered on 

4/2/20at 09:05;  Start 4/2/20 at 09:00;  Stop 4/2/20 at 09:06;  Status DC


Sodium Chloride 90 meq/Potassium Chloride 15 meq/ Potassium Phosphate 18 mmol/ 

Magnesium Sulfate 8 meq/Calcium Gluconate 15 meq/ Multivitamins 10 ml/Chromium/ 

Copper/Manganese/ Seleni/Zn 0.5 ml/ Insulin Human Regular 20 unit/ Total 

Parenteral Nutrition/Amino Acids/Dextrose/ Fat Emulsion Intravenous 1,400 ml @  

58.333 mls/ hr TPN  CONT IV  Last administered on 4/2/20at 22:45;  Start 4/2/20 

at 22:00;  Stop 4/3/20 at 21:59;  Status DC


Sodium Chloride 1,000 ml @  1,000 mls/hr Q1H PRN IV hypotension;  Start 4/3/20 

at 07:30;  Stop 4/3/20 at 13:29;  Status DC


Albumin Human 200 ml @  200 mls/hr 1X PRN  PRN IV Hypotension Last administered 

on 4/3/20at 09:36;  Start 4/3/20 at 07:30;  Stop 4/3/20 at 13:29;  Status DC


Sodium Chloride (Normal Saline Flush) 10 ml 1X PRN  PRN IV AP catheter pack;  

Start 4/3/20 at 07:30;  Stop 4/3/20 at 21:29;  Status DC


Sodium Chloride (Normal Saline Flush) 10 ml 1X PRN  PRN IV  catheter pack;  

Start 4/3/20 at 07:30;  Stop 4/4/20 at 07:29;  Status DC


Sodium Chloride 1,000 ml @  400 mls/hr Q2H30M PRN IV PATENCY;  Start 4/3/20 at 

07:30;  Stop 4/3/20 at 19:29;  Status DC


Info (PHARMACY MONITORING -- do not chart) 1 each PRN DAILY  PRN MC SEE 

COMMENTS;  Start 4/3/20 at 07:30;  Stop 4/3/20 at 13:02;  Status DC


Info (PHARMACY MONITORING -- do not chart) 1 each PRN DAILY  PRN MC SEE 

COMMENTS;  Start 4/3/20 at 07:30;  Stop 4/5/20 at 12:45;  Status DC


Sodium Chloride 90 meq/Potassium Chloride 15 meq/ Potassium Phosphate 10 mmol/ 

Magnesium Sulfate 8 meq/Calcium Gluconate 15 meq/ Multivitamins 10 ml/Chromium/ 

Copper/Manganese/ Seleni/Zn 0.5 ml/ Insulin Human Regular 25 unit/ Total Par

enteral Nutrition/Amino Acids/Dextrose/ Fat Emulsion Intravenous 1,400 ml @  

58.333 mls/ hr TPN  CONT IV  Last administered on 4/3/20at 22:19;  Start 4/3/20 

at 22:00;  Stop 4/4/20 at 21:59;  Status DC


Heparin Sodium (Porcine) (Heparin Sodium) 5,000 unit Q12HR SQ  Last administered

on 4/26/20at 08:59;  Start 4/3/20 at 21:00;  Stop 4/26/20 at 10:05;  Status DC


Ondansetron HCl (Zofran) 4 mg PRN Q6HRS  PRN IV NAUSEA/VOMITING;  Start 4/6/20 

at 07:00;  Stop 4/7/20 at 06:59;  Status DC


Fentanyl Citrate (Fentanyl 2ml Vial) 25 mcg PRN Q5MIN  PRN IV MILD PAIN 1-3;  

Start 4/6/20 at 07:00;  Stop 4/7/20 at 06:59;  Status DC


Fentanyl Citrate (Fentanyl 2ml Vial) 50 mcg PRN Q5MIN  PRN IV MODERATE TO SEVERE

PAIN;  Start 4/6/20 at 07:00;  Stop 4/7/20 at 06:59;  Status DC


Ringer's Solution 1,000 ml @  30 mls/hr Q24H IV ;  Start 4/6/20 at 07:00;  Stop 

4/6/20 at 18:59;  Status DC


Lidocaine HCl (Xylocaine-Mpf 1% 2ml Vial) 2 ml PRN 1X  PRN ID PRIOR TO IV START;

 Start 4/6/20 at 07:00;  Stop 4/7/20 at 06:59;  Status DC


Prochlorperazine Edisylate (Compazine) 5 mg PACU PRN  PRN IV NAUSEA, MRX1;  

Start 4/6/20 at 07:00;  Stop 4/7/20 at 06:59;  Status DC


Sodium Chloride 1,000 ml @  1,000 mls/hr Q1H PRN IV hypotension;  Start 4/4/20 

at 09:10;  Stop 4/4/20 at 15:09;  Status DC


Albumin Human 200 ml @  200 mls/hr 1X PRN  PRN IV Hypotension Last administered 

on 4/4/20at 10:10;  Start 4/4/20 at 09:15;  Stop 4/4/20 at 15:14;  Status DC


Sodium Chloride 1,000 ml @  400 mls/hr Q2H30M PRN IV PATENCY;  Start 4/4/20 at 

09:10;  Stop 4/4/20 at 21:09;  Status DC


Info (PHARMACY MONITORING -- do not chart) 1 each PRN DAILY  PRN MC SEE 

COMMENTS;  Start 4/4/20 at 09:15;  Stop 4/5/20 at 12:45;  Status DC


Info (PHARMACY MONITORING -- do not chart) 1 each PRN DAILY  PRN MC SEE 

COMMENTS;  Start 4/4/20 at 09:15;  Stop 4/5/20 at 12:45;  Status DC


Sodium Chloride 90 meq/Potassium Chloride 15 meq/ Potassium Phosphate 10 mmol/ 

Magnesium Sulfate 8 meq/Calcium Gluconate 15 meq/ Multivitamins 10 ml/Chromium/ 

Copper/Manganese/ Seleni/Zn 0.5 ml/ Insulin Human Regular 25 unit/ Total 

Parenteral Nutrition/Amino Acids/Dextrose/ Fat Emulsion Intravenous 1,400 ml @  

58.333 mls/ hr TPN  CONT IV  Last administered on 4/4/20at 22:10;  Start 4/4/20 

at 22:00;  Stop 4/5/20 at 21:59;  Status DC


Magnesium Sulfate 50 ml @ 25 mls/hr PRN DAILY  PRN IV for Mag < 1.7 on am labs 

Last administered on 4/20/20at 17:27;  Start 4/5/20 at 09:15


Sodium Chloride 90 meq/Potassium Chloride 15 meq/ Potassium Phosphate 10 mmol/ 

Magnesium Sulfate 8 meq/Calcium Gluconate 15 meq/ Multivitamins 10 ml/Chromium/ 

Copper/Manganese/ Seleni/Zn 0.5 ml/ Insulin Human Regular 25 unit/ Total 

Parenteral Nutrition/Amino Acids/Dextrose/ Fat Emulsion Intravenous 1,400 ml @  

58.333 mls/ hr TPN  CONT IV  Last administered on 4/5/20at 21:20;  Start 4/5/20 

at 22:00;  Stop 4/6/20 at 21:59;  Status DC


Sodium Chloride 1,000 ml @  1,000 mls/hr Q1H PRN IV hypotension;  Start 4/5/20 

at 12:23;  Stop 4/5/20 at 18:22;  Status DC


Albumin Human 200 ml @  200 mls/hr 1X  ONCE IV  Last administered on 4/5/20at 

13:34;  Start 4/5/20 at 12:30;  Stop 4/5/20 at 13:29;  Status DC


Diphenhydramine HCl (Benadryl) 25 mg 1X PRN  PRN IV ITCHING;  Start 4/5/20 at 

12:30;  Stop 4/6/20 at 12:29;  Status DC


Diphenhydramine HCl (Benadryl) 25 mg 1X PRN  PRN IV ITCHING;  Start 4/5/20 at 

12:30;  Stop 4/6/20 at 12:29;  Status DC


Info (PHARMACY MONITORING -- do not chart) 1 each PRN DAILY  PRN MC SEE 

COMMENTS;  Start 4/5/20 at 12:30;  Status Cancel


Bupivacaine HCl/ Epinephrine Bitart (Sensorcain-Epi 0.5%-1:[id number] Mpf) 30 ml 

STK-MED ONCE .ROUTE  Last administered on 4/6/20at 11:44;  Start 4/6/20 at 

11:00;  Stop 4/6/20 at 11:01;  Status DC


Cellulose (Surgicel Fibrillar 1x2) 1 each STK-MED ONCE .ROUTE ;  Start 4/6/20 at

11:00;  Stop 4/6/20 at 11:01;  Status DC


Sodium Chloride 90 meq/Potassium Chloride 15 meq/ Potassium Phosphate 10 mmol/ 

Magnesium Sulfate 12 meq/Calcium Gluconate 15 meq/ Multivitamins 10 ml/Chromium/

Copper/Manganese/ Seleni/Zn 0.5 ml/ Insulin Human Regular 25 unit/ Total 

Parenteral Nutrition/Amino Acids/Dextrose/ Fat Emulsion Intravenous 1,400 ml @  

58.333 mls/ hr TPN  CONT IV  Last administered on 4/6/20at 22:24;  Start 4/6/20 

at 22:00;  Stop 4/7/20 at 21:59;  Status DC


Propofol 20 ml @ As Directed STK-MED ONCE IV ;  Start 4/6/20 at 11:07;  Stop 

4/6/20 at 11:07;  Status DC


Cellulose (Surgicel Hemostat 4x8) 1 each STK-MED ONCE .ROUTE  Last administered 

on 4/6/20at 11:44;  Start 4/6/20 at 11:55;  Stop 4/6/20 at 11:56;  Status DC


Sevoflurane (Ultane) 60 ml STK-MED ONCE IH ;  Start 4/6/20 at 12:46;  Stop 

4/6/20 at 12:46;  Status DC


Sodium Chloride 1,000 ml @  1,000 mls/hr Q1H PRN IV hypotension;  Start 4/6/20 

at 13:51;  Stop 4/6/20 at 19:50;  Status DC


Albumin Human 200 ml @  200 mls/hr 1X PRN  PRN IV Hypotension Last administered 

on 4/6/20at 14:51;  Start 4/6/20 at 14:00;  Stop 4/6/20 at 19:59;  Status DC


Diphenhydramine HCl (Benadryl) 25 mg 1X PRN  PRN IV ITCHING;  Start 4/6/20 at 

14:00;  Stop 4/7/20 at 13:59;  Status DC


Diphenhydramine HCl (Benadryl) 25 mg 1X PRN  PRN IV ITCHING;  Start 4/6/20 at 

14:00;  Stop 4/7/20 at 13:59;  Status DC


Sodium Chloride 1,000 ml @  400 mls/hr Q2H30M PRN IV PATENCY;  Start 4/6/20 at 

13:51;  Stop 4/7/20 at 01:50;  Status DC


Info (PHARMACY MONITORING -- do not chart) 1 each PRN DAILY  PRN MC SEE 

COMMENTS;  Start 4/6/20 at 14:00;  Stop 4/9/20 at 08:16;  Status DC


Heparin Sodium (Porcine) (Hep Lock Adult) 500 unit STK-MED ONCE IVP ;  Start 

4/7/20 at 09:29;  Stop 4/7/20 at 09:30;  Status DC


Sodium Chloride 1,000 ml @  1,000 mls/hr Q1H PRN IV hypotension;  Start 4/7/20 

at 10:43;  Stop 4/7/20 at 16:42;  Status DC


Sodium Chloride 1,000 ml @  400 mls/hr Q2H30M PRN IV PATENCY;  Start 4/7/20 at 

10:43;  Stop 4/7/20 at 22:42;  Status DC


Info (PHARMACY MONITORING -- do not chart) 1 each PRN DAILY  PRN MC SEE 

COMMENTS;  Start 4/7/20 at 10:45;  Status UNV


Info (PHARMACY MONITORING -- do not chart) 1 each PRN DAILY  PRN MC SEE 

COMMENTS;  Start 4/7/20 at 10:45;  Status UNV


Sodium Chloride 90 meq/Potassium Chloride 15 meq/ Magnesium Sulfate 12 

meq/Calcium Gluconate 15 meq/ Multivitamins 10 ml/Chromium/ Copper/Manganese/ 

Seleni/Zn 0.5 ml/ Insulin Human Regular 25 unit/ Total Parenteral 

Nutrition/Amino Acids/Dextrose/ Fat Emulsion Intravenous 1,400 ml @  58.333 mls/

hr TPN  CONT IV  Last administered on 4/7/20at 22:13;  Start 4/7/20 at 22:00;  

Stop 4/8/20 at 21:59;  Status DC


Sodium Chloride 1,000 ml @  1,000 mls/hr Q1H PRN IV hypotension;  Start 4/8/20 

at 07:50;  Stop 4/8/20 at 13:49;  Status DC


Albumin Human 200 ml @  200 mls/hr 1X  ONCE IV ;  Start 4/8/20 at 08:00;  Stop 

4/8/20 at 08:53;  Status DC


Diphenhydramine HCl (Benadryl) 25 mg 1X PRN  PRN IV ITCHING;  Start 4/8/20 at 

08:00;  Stop 4/9/20 at 07:59;  Status DC


Diphenhydramine HCl (Benadryl) 25 mg 1X PRN  PRN IV ITCHING;  Start 4/8/20 at 

08:00;  Stop 4/9/20 at 07:59;  Status DC


Info (PHARMACY MONITORING -- do not chart) 1 each PRN DAILY  PRN MC SEE C

OMMENTS;  Start 4/8/20 at 08:00;  Stop 4/9/20 at 08:16;  Status DC


Albumin Human 50 ml @ 50 mls/hr 1X  ONCE IV ;  Start 4/8/20 at 08:53;  Stop 

4/8/20 at 08:56;  Status DC


Albumin Human 200 ml @  50 mls/hr PRN 1X  PRN IV HYPOTENSION Last administered 

on 4/14/20at 11:54;  Start 4/8/20 at 09:00;  Stop 5/21/20 at 11:14;  Status DC


Meropenem 500 mg/ Sodium Chloride 50 ml @  100 mls/hr Q12H IV  Last administered

on 4/28/20at 10:45;  Start 4/8/20 at 10:00;  Stop 4/28/20 at 12:37;  Status DC


Sodium Chloride 90 meq/Magnesium Sulfate 12 meq/ Calcium Gluconate 15 meq/ 

Multivitamins 10 ml/Chromium/ Copper/Manganese/ Seleni/Zn 0.5 ml/ Insulin Human 

Regular 25 unit/ Total Parenteral Nutrition/Amino Acids/Dextrose/ Fat Emulsion 

Intravenous 1,400 ml @  58.333 mls/ hr TPN  CONT IV  Last administered on 

4/8/20at 21:41;  Start 4/8/20 at 22:00;  Stop 4/9/20 at 21:59;  Status DC


Sodium Chloride 1,000 ml @  1,000 mls/hr Q1H PRN IV hypotension;  Start 4/9/20 

at 07:58;  Stop 4/9/20 at 13:57;  Status DC


Albumin Human 200 ml @  200 mls/hr 1X PRN  PRN IV Hypotension Last administered 

on 4/9/20at 09:30;  Start 4/9/20 at 08:00;  Stop 4/9/20 at 13:59;  Status DC


Sodium Chloride 1,000 ml @  400 mls/hr Q2H30M PRN IV PATENCY;  Start 4/9/20 at 

07:58;  Stop 4/9/20 at 19:57;  Status DC


Info (PHARMACY MONITORING -- do not chart) 1 each PRN DAILY  PRN MC SEE 

COMMENTS;  Start 4/9/20 at 08:00;  Status Cancel


Info (PHARMACY MONITORING -- do not chart) 1 each PRN DAILY  PRN MC SEE 

COMMENTS;  Start 4/9/20 at 08:15;  Status UNV


Sodium Chloride 90 meq/Potassium Phosphate 5 mmol/ Magnesium Sulfate 12 

meq/Calcium Gluconate 15 meq/ Multivitamins 10 ml/Chromium/ Copper/Manganese/ 

Seleni/Zn 0.5 ml/ Insulin Human Regular 30 unit/ Total Parenteral 

Nutrition/Amino Acids/Dextrose/ Fat Emulsion Intravenous 1,400 ml @  58.333 mls/

hr TPN  CONT IV  Last administered on 4/9/20at 22:08;  Start 4/9/20 at 22:00;  

Stop 4/10/20 at 21:59;  Status DC


Linezolid/Dextrose 300 ml @  300 mls/hr Q12HR IV  Last administered on 4/20/20at

20:40;  Start 4/10/20 at 11:00;  Stop 4/21/20 at 08:10;  Status DC


Sodium Chloride 90 meq/Potassium Phosphate 15 mmol/ Magnesium Sulfate 12 

meq/Calcium Gluconate 15 meq/ Multivitamins 10 ml/Chromium/ Copper/Manganese/ 

Seleni/Zn 0.5 ml/ Insulin Human Regular 30 unit/ Total Parenteral 

Nutrition/Amino Acids/Dextrose/ Fat Emulsion Intravenous 1,400 ml @  58.333 mls/

hr TPN  CONT IV  Last administered on 4/10/20at 21:49;  Start 4/10/20 at 22:00; 

Stop 4/11/20 at 21:59;  Status DC


Sodium Chloride 90 meq/Potassium Phosphate 15 mmol/ Magnesium Sulfate 12 meq/Ca

lcium Gluconate 15 meq/ Multivitamins 10 ml/Chromium/ Copper/Manganese/ 

Seleni/Zn 0.5 ml/ Insulin Human Regular 40 unit/ Total Parenteral 

Nutrition/Amino Acids/Dextrose/ Fat Emulsion Intravenous 1,400 ml @  58.333 mls/

hr TPN  CONT IV  Last administered on 4/11/20at 21:21;  Start 4/11/20 at 22:00; 

Stop 4/12/20 at 21:59;  Status DC


Sodium Chloride 1,000 ml @  1,000 mls/hr Q1H PRN IV hypotension;  Start 4/11/20 

at 13:26;  Stop 4/11/20 at 19:25;  Status DC


Albumin Human 200 ml @  200 mls/hr 1X PRN  PRN IV Hypotension Last administered 

on 4/11/20at 15:00;  Start 4/11/20 at 13:30;  Stop 4/11/20 at 19:29;  Status DC


Sodium Chloride (Normal Saline Flush) 10 ml 1X PRN  PRN IV AP catheter pack;  

Start 4/11/20 at 13:30;  Stop 4/12/20 at 13:29;  Status DC


Sodium Chloride (Normal Saline Flush) 10 ml 1X PRN  PRN IV  catheter pack;  

Start 4/11/20 at 13:30;  Stop 4/12/20 at 13:29;  Status DC


Sodium Chloride 1,000 ml @  400 mls/hr Q2H30M PRN IV PATENCY;  Start 4/11/20 at 

13:26;  Stop 4/12/20 at 01:25;  Status DC


Info (PHARMACY MONITORING -- do not chart) 1 each PRN DAILY  PRN MC SEE 

COMMENTS;  Start 4/11/20 at 13:30;  Stop 4/11/20 at 13:33;  Status DC


Info (PHARMACY MONITORING -- do not chart) 1 each PRN DAILY  PRN MC SEE 

COMMENTS;  Start 4/11/20 at 13:30;  Stop 4/11/20 at 13:34;  Status DC


Sodium Chloride 90 meq/Potassium Phosphate 19 mmol/ Magnesium Sulfate 12 

meq/Calcium Gluconate 15 meq/ Multivitamins 10 ml/Chromium/ Copper/Manganese/ 

Seleni/Zn 0.5 ml/ Insulin Human Regular 40 unit/ Total Parenteral 

Nutrition/Amino Acids/Dextrose/ Fat Emulsion Intravenous 1,400 ml @  58.333 mls/

hr TPN  CONT IV  Last administered on 4/12/20at 21:54;  Start 4/12/20 at 22:00; 

Stop 4/13/20 at 21:59;  Status DC


Sodium Chloride 1,000 ml @  1,000 mls/hr Q1H PRN IV hypotension;  Start 4/13/20 

at 09:35;  Stop 4/13/20 at 15:34;  Status DC


Albumin Human 200 ml @  200 mls/hr 1X PRN  PRN IV Hypotension;  Start 4/13/20 at

09:45;  Stop 4/13/20 at 15:44;  Status DC


Diphenhydramine HCl (Benadryl) 25 mg 1X PRN  PRN IV ITCHING;  Start 4/13/20 at 

09:45;  Stop 4/14/20 at 09:44;  Status DC


Diphenhydramine HCl (Benadryl) 25 mg 1X PRN  PRN IV ITCHING;  Start 4/13/20 at 

09:45;  Stop 4/14/20 at 09:44;  Status DC


Sodium Chloride 1,000 ml @  400 mls/hr Q2H30M PRN IV PATENCY;  Start 4/13/20 at 

09:35;  Stop 4/13/20 at 21:34;  Status DC


Info (PHARMACY MONITORING -- do not chart) 1 each PRN DAILY  PRN MC SEE 

COMMENTS;  Start 4/13/20 at 09:45;  Status Cancel


Sodium Chloride 100 meq/Potassium Phosphate 19 mmol/ Magnesium Sulfate 12 

meq/Calcium Gluconate 15 meq/ Multivitamins 10 ml/Chromium/ Copper/Manganese/ 

Seleni/Zn 0.5 ml/ Insulin Human Regular 40 unit/ Potassium Chloride 20 meq/ 

Total Parenteral Nutrition/Amino Acids/Dextrose/ Fat Emulsion Intravenous 1,400 

ml @  58.333 mls/ hr TPN  CONT IV  Last administered on 4/13/20at 22:02;  Start 

4/13/20 at 22:00;  Stop 4/14/20 at 21:59;  Status DC


Furosemide (Lasix) 40 mg 1X  ONCE IVP  Last administered on 4/13/20at 14:39;  

Start 4/13/20 at 14:30;  Stop 4/13/20 at 14:31;  Status DC


Metronidazole 100 ml @  100 mls/hr Q8HRS IV  Last administered on 4/21/20at 

06:04;  Start 4/14/20 at 10:00;  Stop 4/21/20 at 08:10;  Status DC


Sodium Chloride 1,000 ml @  1,000 mls/hr Q1H PRN IV hypotension;  Start 4/14/20 

at 08:00;  Stop 4/14/20 at 13:59;  Status DC


Albumin Human 200 ml @  200 mls/hr 1X PRN  PRN IV Hypotension;  Start 4/14/20 at

08:00;  Stop 4/14/20 at 13:59;  Status DC


Sodium Chloride 1,000 ml @  400 mls/hr Q2H30M PRN IV PATENCY;  Start 4/14/20 at 

08:00;  Stop 4/14/20 at 19:59;  Status DC


Info (PHARMACY MONITORING -- do not chart) 1 each PRN DAILY  PRN MC SEE 

COMMENTS;  Start 4/14/20 at 11:30;  Status UNV


Info (PHARMACY MONITORING -- do not chart) 1 each PRN DAILY  PRN MC SEE 

COMMENTS;  Start 4/14/20 at 11:30;  Stop 4/16/20 at 12:13;  Status DC


Sodium Chloride 100 meq/Potassium Phosphate 19 mmol/ Magnesium Sulfate 12 

meq/Calcium Gluconate 15 meq/ Multivitamins 10 ml/Chromium/ Copper/Manganese/ 

Seleni/Zn 0.5 ml/ Insulin Human Regular 40 unit/ Potassium Chloride 20 meq/ 

Total Parenteral Nutrition/Amino Acids/Dextrose/ Fat Emulsion Intravenous 1,400 

ml @  58.333 mls/ hr TPN  CONT IV  Last administered on 4/14/20at 21:52;  Start 

4/14/20 at 22:00;  Stop 4/15/20 at 21:59;  Status DC


Sodium Chloride (Normal Saline Flush) 10 ml QSHIFT  PRN IV AFTER MEDS AND BLOOD 

DRAWS;  Start 4/14/20 at 15:00;  Stop 5/12/20 at 11:27;  Status DC


Sodium Chloride (Normal Saline Flush) 10 ml PRN Q5MIN  PRN IV AFTER MEDS AND 

BLOOD DRAWS;  Start 4/14/20 at 15:00


Sodium Chloride (Normal Saline Flush) 20 ml PRN Q5MIN  PRN IV AFTER MEDS AND 

BLOOD DRAWS;  Start 4/14/20 at 15:00


Sodium Chloride 100 meq/Potassium Phosphate 19 mmol/ Magnesium Sulfate 12 

meq/Calcium Gluconate 15 meq/ Multivitamins 10 ml/Chromium/ Copper/Manganese/ 

Seleni/Zn 0.5 ml/ Insulin Human Regular 40 unit/ Potassium Chloride 20 meq/ 

Total Parenteral Nutrition/Amino Acids/Dextrose/ Fat Emulsion Intravenous 1,400 

ml @  58.333 mls/ hr TPN  CONT IV  Last administered on 4/15/20at 21:20;  Start 

4/15/20 at 22:00;  Stop 4/16/20 at 21:59;  Status DC


Lidocaine HCl (Buffered Lidocaine 1%) 3 ml STK-MED ONCE .ROUTE ;  Start 4/15/20 

at 13:16;  Stop 4/15/20 at 13:16;  Status DC


Lidocaine HCl (Buffered Lidocaine 1%) 6 ml 1X  ONCE INJ  Last administered on 

4/15/20at 13:45;  Start 4/15/20 at 13:30;  Stop 4/15/20 at 13:31;  Status DC


Albumin Human 100 ml @  100 mls/hr 1X  ONCE IV  Last administered on 4/15/20at 

15:41;  Start 4/15/20 at 15:00;  Stop 4/15/20 at 15:59;  Status DC


Albumin Human 50 ml @ 50 mls/hr 1X  ONCE IV  Last administered on 4/15/20at 

15:00;  Start 4/15/20 at 15:00;  Stop 4/15/20 at 15:59;  Status DC


Info (PHARMACY MONITORING -- do not chart) 1 each PRN DAILY  PRN MC SEE 

COMMENTS;  Start 4/16/20 at 11:30;  Status Cancel


Info (PHARMACY MONITORING -- do not chart) 1 each PRN DAILY  PRN MC SEE 

COMMENTS;  Start 4/16/20 at 11:30;  Status UNV


Sodium Chloride 100 meq/Potassium Phosphate 10 mmol/ Magnesium Sulfate 12 

meq/Calcium Gluconate 15 meq/ Multivitamins 10 ml/Chromium/ Copper/Manganese/ 

Seleni/Zn 0.5 ml/ Insulin Human Regular 35 unit/ Potassium Chloride 20 meq/ 

Total Parenteral Nutrition/Amino Acids/Dextrose/ Fat Emulsion Intravenous 1,400 

ml @  58.333 mls/ hr TPN  CONT IV  Last administered on 4/16/20at 22:10;  Start 

4/16/20 at 22:00;  Stop 4/17/20 at 21:59;  Status DC


Sodium Chloride 100 meq/Potassium Phosphate 5 mmol/ Magnesium Sulfate 12 

meq/Calcium Gluconate 15 meq/ Multivitamins 10 ml/Chromium/ Copper/Manganese/ 

Seleni/Zn 0.5 ml/ Insulin Human Regular 35 unit/ Potassium Chloride 20 meq/ 

Total Parenteral Nutrition/Amino Acids/Dextrose/ Fat Emulsion Intravenous 1,400 

ml @  58.333 mls/ hr TPN  CONT IV  Last administered on 4/17/20at 22:59;  Start 

4/17/20 at 22:00;  Stop 4/18/20 at 21:59;  Status DC


Sodium Chloride 1,000 ml @  1,000 mls/hr Q1H PRN IV hypotension;  Start 4/18/20 

at 08:27;  Stop 4/18/20 at 14:26;  Status DC


Albumin Human 200 ml @  200 mls/hr 1X PRN  PRN IV Hypotension Last administered 

on 4/18/20at 09:18;  Start 4/18/20 at 08:30;  Stop 4/18/20 at 14:29;  Status DC


Sodium Chloride 1,000 ml @  400 mls/hr Q2H30M PRN IV PATENCY;  Start 4/18/20 at 

08:27;  Stop 4/18/20 at 20:26;  Status DC


Info (PHARMACY MONITORING -- do not chart) 1 each PRN DAILY  PRN MC SEE 

COMMENTS;  Start 4/18/20 at 08:30;  Status Cancel


Info (PHARMACY MONITORING -- do not chart) 1 each PRN DAILY  PRN MC SEE 

COMMENTS;  Start 4/18/20 at 08:30;  Stop 4/26/20 at 13:10;  Status DC


Sodium Chloride 100 meq/Potassium Chloride 40 meq/ Magnesium Sulfate 15 

meq/Calcium Gluconate 15 meq/ Multivitamins 10 ml/Chromium/ Copper/Manganese/ 

Seleni/Zn 0.5 ml/ Insulin Human Regular 35 unit/ Total Parenteral 

Nutrition/Amino Acids/Dextrose/ Fat Emulsion Intravenous 1,400 ml @  58.333 mls/

hr TPN  CONT IV  Last administered on 4/18/20at 22:00;  Start 4/18/20 at 22:00; 

Stop 4/19/20 at 21:59;  Status DC


Potassium Chloride/Water 100 ml @  100 mls/hr 1X  ONCE IV  Last administered on 

4/18/20at 17:28;  Start 4/18/20 at 14:45;  Stop 4/18/20 at 15:44;  Status DC


Sodium Chloride 100 meq/Potassium Chloride 40 meq/ Magnesium Sulfate 15 meq/Ca

lcium Gluconate 15 meq/ Multivitamins 10 ml/Chromium/ Copper/Manganese/ 

Seleni/Zn 0.5 ml/ Insulin Human Regular 35 unit/ Total Parenteral 

Nutrition/Amino Acids/Dextrose/ Fat Emulsion Intravenous 1,400 ml @  58.333 mls/

hr TPN  CONT IV  Last administered on 4/19/20at 22:46;  Start 4/19/20 at 22:00; 

Stop 4/20/20 at 21:59;  Status DC


Sodium Chloride 100 meq/Potassium Chloride 40 meq/ Magnesium Sulfate 20 

meq/Calcium Gluconate 15 meq/ Multivitamins 10 ml/Chromium/ Copper/Manganese/ 

Seleni/Zn 0.5 ml/ Insulin Human Regular 35 unit/ Total Parenteral 

Nutrition/Amino Acids/Dextrose/ Fat Emulsion Intravenous 1,400 ml @  58.333 mls/

hr TPN  CONT IV  Last administered on 4/20/20at 22:31;  Start 4/20/20 at 22:00; 

Stop 4/21/20 at 21:59;  Status DC


Fentanyl Citrate (Fentanyl 2ml Vial) 50 mcg PRN Q2HR  PRN IVP PAIN Last 

administered on 4/27/20at 13:32;  Start 4/20/20 at 21:00;  Stop 4/28/20 at 12:

53;  Status DC


Fentanyl Citrate (Fentanyl 2ml Vial) 25 mcg PRN Q2HR  PRN IVP PAIN;  Start 

4/20/20 at 21:00;  Stop 4/28/20 at 12:54;  Status DC


Enoxaparin Sodium (Lovenox 100mg Syringe) 100 mg Q12HR SQ ;  Start 4/21/20 at 

21:00;  Status UNV


Amino Acids/ Glycerin/ Electrolytes 1,000 ml @  75 mls/hr [id number] IV ;  Start 

4/20/20 at 21:15;  Status UNV


Sodium Chloride 1,000 ml @  1,000 mls/hr Q1H PRN IV hypotension;  Start 4/21/20 

at 07:56;  Stop 4/21/20 at 13:55;  Status DC


Albumin Human 200 ml @  200 mls/hr 1X PRN  PRN IV Hypotension Last administered 

on 4/21/20at 08:40;  Start 4/21/20 at 08:00;  Stop 4/21/20 at 13:59;  Status DC


Sodium Chloride 1,000 ml @  400 mls/hr Q2H30M PRN IV PATENCY;  Start 4/21/20 at 

07:56;  Stop 4/21/20 at 19:55;  Status DC


Info (PHARMACY MONITORING -- do not chart) 1 each PRN DAILY  PRN MC SEE 

COMMENTS;  Start 4/21/20 at 08:00;  Status UNV


Info (PHARMACY MONITORING -- do not chart) 1 each PRN DAILY  PRN MC SEE 

COMMENTS;  Start 4/21/20 at 08:00;  Status UNV


Daptomycin 430 mg/ Sodium Chloride 50 ml @  100 mls/hr Q24H IV  Last 

administered on 4/21/20at 12:35;  Start 4/21/20 at 09:00;  Stop 4/21/20 at 

12:49;  Status DC


Sodium Chloride 100 meq/Potassium Chloride 40 meq/ Magnesium Sulfate 20 

meq/Calcium Gluconate 15 meq/ Multivitamins 10 ml/Chromium/ Copper/Manganese/ 

Seleni/Zn 0.5 ml/ Insulin Human Regular 35 unit/ Total Parenteral 

Nutrition/Amino Acids/Dextrose/ Fat Emulsion Intravenous 1,400 ml @  58.333 mls/

hr TPN  CONT IV  Last administered on 4/21/20at 21:26;  Start 4/21/20 at 22:00; 

Stop 4/22/20 at 21:59;  Status DC


Daptomycin 430 mg/ Sodium Chloride 50 ml @  100 mls/hr Q48H IV ;  Start 4/23/20 

at 09:00;  Stop 4/22/20 at 11:55;  Status DC


Sodium Chloride 100 meq/Potassium Chloride 40 meq/ Magnesium Sulfate 20 

meq/Calcium Gluconate 15 meq/ Multivitamins 10 ml/Chromium/ Copper/Manganese/ 

Seleni/Zn 0.5 ml/ Insulin Human Regular 35 unit/ Total Parenteral Nutrition/Am

yas Acids/Dextrose/ Fat Emulsion Intravenous 1,400 ml @  58.333 mls/ hr TPN  

CONT IV  Last administered on 4/22/20at 22:27;  Start 4/22/20 at 22:00;  Stop 

4/23/20 at 21:59;  Status DC


Daptomycin 430 mg/ Sodium Chloride 50 ml @  100 mls/hr Q24H IV  Last adm

inistered on 4/24/20at 15:07;  Start 4/22/20 at 13:00;  Stop 4/25/20 at 13:15;  

Status DC


Sodium Chloride 100 meq/Potassium Chloride 40 meq/ Magnesium Sulfate 20 

meq/Calcium Gluconate 10 meq/ Multivitamins 10 ml/Chromium/ Copper/Manganese/ 

Seleni/Zn 0.5 ml/ Insulin Human Regular 35 unit/ Total Parenteral 

Nutrition/Amino Acids/Dextrose/ Fat Emulsion Intravenous 1,400 ml @  58.333 mls/

hr TPN  CONT IV  Last administered on 4/24/20at 00:06;  Start 4/23/20 at 22:00; 

Stop 4/24/20 at 21:59;  Status DC


Alteplase, Recombinant (Cathflo For Central Catheter Clearance) 1 mg 1X  ONCE 

INT CAT  Last administered on 4/24/20at 11:44;  Start 4/24/20 at 10:45;  Stop 

4/24/20 at 10:46;  Status DC


Ondansetron HCl (Zofran) 4 mg PRN Q6HRS  PRN IV NAUSEA/VOMITING;  Start 4/27/20 

at 07:00;  Stop 4/28/20 at 06:59;  Status DC


Fentanyl Citrate (Fentanyl 2ml Vial) 25 mcg PRN Q5MIN  PRN IV MILD PAIN 1-3;  

Start 4/27/20 at 07:00;  Stop 4/28/20 at 06:59;  Status DC


Fentanyl Citrate (Fentanyl 2ml Vial) 50 mcg PRN Q5MIN  PRN IV MODERATE TO SEVERE

PAIN Last administered on 4/27/20at 10:17;  Start 4/27/20 at 07:00;  Stop 

4/28/20 at 06:59;  Status DC


Ringer's Solution 1,000 ml @  30 mls/hr Q24H IV ;  Start 4/27/20 at 07:00;  Stop

4/27/20 at 18:59;  Status DC


Lidocaine HCl (Xylocaine-Mpf 1% 2ml Vial) 2 ml PRN 1X  PRN ID PRIOR TO IV START;

 Start 4/27/20 at 07:00;  Stop 4/28/20 at 06:59;  Status DC


Prochlorperazine Edisylate (Compazine) 5 mg PACU PRN  PRN IV NAUSEA, MRX1;  

Start 4/27/20 at 07:00;  Stop 4/28/20 at 06:59;  Status DC


Sodium Acetate 50 meq/Potassium Acetate 55 meq/ Magnesium Sulfate 20 meq/Calcium

Gluconate 10 meq/ Multivitamins 10 ml/Chromium/ Copper/Manganese/ Seleni/Zn 0.5 

ml/ Insulin Human Regular 35 unit/ Total Parenteral Nutrition/Amino 

Acids/Dextrose/ Fat Emulsion Intravenous 1,400 ml @  58.333 mls/ hr TPN  CONT IV

;  Start 4/24/20 at 22:00;  Stop 4/24/20 at 14:15;  Status DC


Sodium Acetate 50 meq/Potassium Acetate 55 meq/ Magnesium Sulfate 20 meq/Calcium

Gluconate 10 meq/ Multivitamins 10 ml/Chromium/ Copper/Manganese/ Seleni/Zn 0.5 

ml/ Insulin Human Regular 35 unit/ Total Parenteral Nutrition/Amino 

Acids/Dextrose/ Fat Emulsion Intravenous 1,800 ml @  75 mls/hr TPN  CONT IV  

Last administered on 4/24/20at 22:38;  Start 4/24/20 at 22:00;  Stop 4/25/20 at 

21:59;  Status DC


Sodium Chloride 1,000 ml @  1,000 mls/hr Q1H PRN IV hypotension;  Start 4/24/20 

at 15:31;  Stop 4/24/20 at 21:30;  Status DC


Diphenhydramine HCl (Benadryl) 25 mg 1X PRN  PRN IV ITCHING;  Start 4/24/20 at 

15:45;  Stop 4/25/20 at 15:44;  Status DC


Diphenhydramine HCl (Benadryl) 25 mg 1X PRN  PRN IV ITCHING;  Start 4/24/20 at 

15:45;  Stop 4/25/20 at 15:44;  Status DC


Sodium Chloride 1,000 ml @  400 mls/hr Q2H30M PRN IV PATENCY;  Start 4/24/20 at 

15:31;  Stop 4/25/20 at 03:30;  Status DC


Info (PHARMACY MONITORING -- do not chart) 1 each PRN DAILY  PRN MC SEE 

COMMENTS;  Start 4/24/20 at 15:45;  Stop 5/26/20 at 14:14;  Status DC


Sodium Acetate 50 meq/Potassium Acetate 55 meq/ Magnesium Sulfate 20 meq/Calcium

Gluconate 10 meq/ Multivitamins 10 ml/Chromium/ Copper/Manganese/ Seleni/Zn 0.5 

ml/ Insulin Human Regular 35 unit/ Total Parenteral Nutrition/Amino 

Acids/Dextrose/ Fat Emulsion Intravenous 1,800 ml @  75 mls/hr TPN  CONT IV  

Last administered on 4/25/20at 22:03;  Start 4/25/20 at 22:00;  Stop 4/26/20 at 

21:59;  Status DC


Daptomycin 430 mg/ Sodium Chloride 50 ml @  100 mls/hr Q24H IV  Last 

administered on 4/30/20at 13:00;  Start 4/25/20 at 13:00;  Stop 4/30/20 at 

20:58;  Status DC


Heparin Sodium (Porcine) 1000 unit/Sodium Chloride 1,001 ml @  1,001 mls/hr 1X  

ONCE IRR ;  Start 4/27/20 at 06:00;  Stop 4/27/20 at 06:59;  Status DC


Potassium Acetate 55 meq/Magnesium Sulfate 20 meq/ Calcium Gluconate 10 meq/ 

Multivitamins 10 ml/Chromium/ Copper/Manganese/ Seleni/Zn 0.5 ml/ Insulin Human 

Regular 35 unit/ Total Parenteral Nutrition/Amino Acids/Dextrose/ Fat Emulsion 

Intravenous 1,920 ml @  80 mls/hr TPN  CONT IV  Last administered on 4/26/20at 

22:10;  Start 4/26/20 at 22:00;  Stop 4/27/20 at 21:59;  Status DC


Dexamethasone Sodium Phosphate (Decadron) 4 mg STK-MED ONCE .ROUTE ;  Start 

4/27/20 at 10:56;  Stop 4/27/20 at 10:57;  Status DC


Ondansetron HCl (Zofran) 4 mg STK-MED ONCE .ROUTE ;  Start 4/27/20 at 10:56;  

Stop 4/27/20 at 10:57;  Status DC


Rocuronium Bromide (Zemuron) 50 mg STK-MED ONCE .ROUTE ;  Start 4/27/20 at 

10:56;  Stop 4/27/20 at 10:57;  Status DC


Fentanyl Citrate (Fentanyl 2ml Vial) 100 mcg STK-MED ONCE .ROUTE ;  Start 

4/27/20 at 10:56;  Stop 4/27/20 at 10:57;  Status DC


Bupivacaine HCl/ Epinephrine Bitart (Sensorcain-Epi 0.5%-1:[id number] Mpf) 30 ml 

STK-MED ONCE .ROUTE  Last administered on 4/27/20at 12:01;  Start 4/27/20 at 

10:58;  Stop 4/27/20 at 10:58;  Status DC


Cellulose (Surgicel Hemostat 2x14) 1 each STK-MED ONCE .ROUTE ;  Start 4/27/20 

at 10:58;  Stop 4/27/20 at 10:59;  Status DC


Iohexol (Omnipaque 300 Mg/ml) 50 ml STK-MED ONCE .ROUTE ;  Start 4/27/20 at 

10:58;  Stop 4/27/20 at 10:59;  Status DC


Cellulose (Surgicel Hemostat 4x8) 1 each STK-MED ONCE .ROUTE ;  Start 4/27/20 at

10:58;  Stop 4/27/20 at 10:59;  Status DC


Bisacodyl (Dulcolax Supp) 10 mg STK-MED ONCE .ROUTE ;  Start 4/27/20 at 10:59;  

Stop 4/27/20 at 10:59;  Status DC


Heparin Sodium (Porcine) 1000 unit/Sodium Chloride 1,001 ml @  1,001 mls/hr 1X  

ONCE IRR ;  Start 4/27/20 at 12:00;  Stop 4/27/20 at 12:59;  Status DC


Propofol 20 ml @ As Directed STK-MED ONCE IV ;  Start 4/27/20 at 11:05;  Stop 

4/27/20 at 11:05;  Status DC


Sevoflurane (Ultane) 90 ml STK-MED ONCE IH ;  Start 4/27/20 at 11:05;  Stop 

4/27/20 at 11:05;  Status DC


Sevoflurane (Ultane) 60 ml STK-MED ONCE IH ;  Start 4/27/20 at 12:26;  Stop 

4/27/20 at 12:27;  Status DC


Propofol 20 ml @ As Directed STK-MED ONCE IV ;  Start 4/27/20 at 12:26;  Stop 

4/27/20 at 12:27;  Status DC


Phenylephrine HCl (PHENYLEPHRINE in 0.9% NACL PF) 1 mg STK-MED ONCE IV ;  Start 

4/27/20 at 12:34;  Stop 4/27/20 at 12:34;  Status DC


Heparin Sodium (Porcine) (Heparin Sodium) 5,000 unit Q12HR SQ  Last administered

on 5/6/20at 20:57;  Start 4/27/20 at 21:00;  Stop 5/7/20 at 09:59;  Status DC


Sodium Chloride (Normal Saline Flush) 3 ml QSHIFT  PRN IV AFTER MEDS AND BLOOD 

DRAWS;  Start 4/27/20 at 13:45


Naloxone HCl (Narcan) 0.4 mg PRN Q2MIN  PRN IV SEE INSTRUCTIONS Last 

administered on 6/6/20at 15:15;  Start 4/27/20 at 13:45


Sodium Chloride 1,000 ml @  25 mls/hr Q24H IV  Last administered on 5/26/20at 

13:37;  Start 4/27/20 at 13:37;  Stop 5/29/20 at 13:09;  Status DC


Naloxone HCl (Narcan) 0.4 mg PRN Q2MIN  PRN IV SEE INSTRUCTIONS;  Start 4/27/20 

at 14:30;  Status UNV


Sodium Chloride 1,000 ml @  25 mls/hr Q24H IV ;  Start 4/27/20 at 14:30;  Status

UNV


Hydromorphone HCl 30 ml @ 0 mls/hr CONT PRN  PRN IV PER PROTOCOL Last 

administered on 5/2/20at 16:08;  Start 4/27/20 at 14:30;  Stop 5/4/20 at 08:55; 

Status DC


Potassium Acetate 55 meq/Magnesium Sulfate 20 meq/ Calcium Gluconate 10 meq/ 

Multivitamins 10 ml/Chromium/ Copper/Manganese/ Seleni/Zn 0.5 ml/ Insulin Human 

Regular 35 unit/ Total Parenteral Nutrition/Amino Acids/Dextrose/ Fat Emulsion 

Intravenous 1,920 ml @  80 mls/hr TPN  CONT IV  Last administered on 4/27/20at 

22:01;  Start 4/27/20 at 22:00;  Stop 4/28/20 at 21:59;  Status DC


Bumetanide (Bumex) 2 mg BID92 IV  Last administered on 5/1/20at 13:50;  Start 

4/28/20 at 14:00;  Stop 5/2/20 at 14:10;  Status DC


Meropenem 1 gm/ Sodium Chloride 100 ml @  200 mls/hr Q8HRS IV  Last administered

on 5/22/20at 05:53;  Start 4/28/20 at 14:00;  Stop 5/22/20 at 09:31;  Status DC


Potassium Acetate 55 meq/Magnesium Sulfate 20 meq/ Calcium Gluconate 10 meq/ 

Multivitamins 10 ml/Chromium/ Copper/Manganese/ Seleni/Zn 0.5 ml/ Insulin Human 

Regular 35 unit/ Total Parenteral Nutrition/Amino Acids/Dextrose/ Fat Emulsion 

Intravenous 1,920 ml @  80 mls/hr TPN  CONT IV  Last administered on 4/28/20at 

22:02;  Start 4/28/20 at 22:00;  Stop 4/29/20 at 21:59;  Status DC


Hydromorphone HCl (Dilaudid Standard PCA) 12 mg STK-MED ONCE IV ;  Start 4/27/20

at 14:35;  Stop 4/28/20 at 13:53;  Status DC


Artificial Tears (Artificial Tears) 1 drop PRN Q15MIN  PRN OU DRY EYE Last 

administered on 5/29/20at 10:08;  Start 4/29/20 at 05:30


Hydromorphone HCl (Dilaudid Standard PCA) 12 mg STK-MED ONCE IV ;  Start 4/28/20

at 12:05;  Stop 4/29/20 at 09:15;  Status DC


Potassium Acetate 65 meq/Magnesium Sulfate 20 meq/ Calcium Gluconate 10 meq/ 

Multivitamins 10 ml/Chromium/ Copper/Manganese/ Seleni/Zn 0.5 ml/ Insulin Human 

Regular 30 unit/ Total Parenteral Nutrition/Amino Acids/Dextrose/ Fat Emulsion 

Intravenous 1,920 ml @  80 mls/hr TPN  CONT IV  Last administered on 4/29/20at 

22:22;  Start 4/29/20 at 22:00;  Stop 4/30/20 at 21:59;  Status DC


Cyclobenzaprine HCl (Flexeril) 10 mg PRN Q6HRS  PRN PO MUSCLE SPASMS;  Start 

4/30/20 at 10:45


Potassium Acetate 55 meq/Magnesium Sulfate 20 meq/ Calcium Gluconate 10 meq/ 

Multivitamins 10 ml/Chromium/ Copper/Manganese/ Seleni/Zn 0.5 ml/ Insulin Human 

Regular 30 unit/ Total Parenteral Nutrition/Amino Acids/Dextrose/ Fat Emulsion 

Intravenous 1,920 ml @  80 mls/hr TPN  CONT IV  Last administered on 5/1/20at 

01:00;  Start 4/30/20 at 22:00;  Stop 5/1/20 at 21:59;  Status DC


Magnesium Sulfate 50 ml @ 25 mls/hr 1X  ONCE IV  Last administered on 4/30/20at 

17:18;  Start 4/30/20 at 12:45;  Stop 4/30/20 at 14:44;  Status DC


Potassium Chloride/Water 100 ml @  100 mls/hr 1X  ONCE IV  Last administered on 

5/1/20at 11:27;  Start 5/1/20 at 12:00;  Stop 5/1/20 at 12:59;  Status DC


Hydromorphone HCl (Dilaudid Standard PCA) 12 mg STK-MED ONCE IV ;  Start 4/29/20

at 10:50;  Stop 5/1/20 at 11:02;  Status DC


Hydromorphone HCl (Dilaudid Standard PCA) 12 mg STK-MED ONCE IV ;  Start 4/30/20

at 13:47;  Stop 5/1/20 at 11:03;  Status DC


Potassium Acetate 30 meq/Magnesium Sulfate 20 meq/ Calcium Gluconate 10 meq/ 

Multivitamins 10 ml/Chromium/ Copper/Manganese/ Seleni/Zn 0.5 ml/ Insulin Human 

Regular 30 unit/ Potassium Chloride 30 meq/ Total Parenteral Nutrition/Amino 

Acids/Dextrose/ Fat Emulsion Intravenous 1,920 ml @  80 mls/hr TPN  CONT IV  

Last administered on 5/1/20at 22:34;  Start 5/1/20 at 22:00;  Stop 5/2/20 at 

21:59;  Status DC


Potassium Chloride/Water 100 ml @  100 mls/hr Q1H IV  Last administered on 5/2/2

0at 13:05;  Start 5/2/20 at 07:00;  Stop 5/2/20 at 10:59;  Status DC


Magnesium Sulfate 50 ml @ 25 mls/hr 1X  ONCE IV  Last administered on 5/2/20at 

10:34;  Start 5/2/20 at 10:30;  Stop 5/2/20 at 12:29;  Status DC


Potassium Chloride 75 meq/ Magnesium Sulfate 20 meq/Calcium Gluconate 10 meq/ 

Multivitamins 10 ml/Chromium/ Copper/Manganese/ Seleni/Zn 0.5 ml/ Insulin Human 

Regular 30 unit/ Total Parenteral Nutrition/Amino Acids/Dextrose/ Fat Emulsion 

Intravenous 1,920 ml @  80 mls/hr TPN  CONT IV  Last administered on 5/2/20at 

21:51;  Start 5/2/20 at 22:00;  Stop 5/3/20 at 22:00;  Status DC


Potassium Chloride 75 meq/ Magnesium Sulfate 20 meq/Calcium Gluconate 10 meq/ 

Multivitamins 10 ml/Chromium/ Copper/Manganese/ Seleni/Zn 0.5 ml/ Insulin Human 

Regular 25 unit/ Total Parenteral Nutrition/Amino Acids/Dextrose/ Fat Emulsion 

Intravenous 1,920 ml @  80 mls/hr TPN  CONT IV  Last administered on 5/3/20at 

22:04;  Start 5/3/20 at 22:00;  Stop 5/4/20 at 21:59;  Status DC


Hydromorphone HCl (Dilaudid) 0.4 mg PRN Q4HRS  PRN IVP PAIN Last administered on

5/4/20at 10:57;  Start 5/4/20 at 09:00;  Stop 5/4/20 at 18:59;  Status DC


Micafungin Sodium 100 mg/Dextrose 100 ml @  100 mls/hr Q24H IV  Last 

administered on 5/26/20at 12:17;  Start 5/4/20 at 11:00;  Stop 5/27/20 at 09:59;

 Status DC


Daptomycin 485 mg/ Sodium Chloride 50 ml @  100 mls/hr Q24H IV  Last 

administered on 5/11/20at 13:10;  Start 5/4/20 at 11:00;  Stop 5/12/20 at 07:44;

 Status DC


Potassium Chloride 75 meq/ Magnesium Sulfate 15 meq/Calcium Gluconate 8 meq/ 

Multivitamins 10 ml/Chromium/ Copper/Manganese/ Seleni/Zn 0.5 ml/ Insulin Human 

Regular 25 unit/ Total Parenteral Nutrition/Amino Acids/Dextrose/ Fat Emulsion 

Intravenous 1,920 ml @  80 mls/hr TPN  CONT IV  Last administered on 5/4/20at 

23:08;  Start 5/4/20 at 22:00;  Stop 5/5/20 at 21:59;  Status DC


Haloperidol Lactate (Haldol Inj) 3 mg 1X  ONCE IVP  Last administered on 

5/4/20at 14:37;  Start 5/4/20 at 14:30;  Stop 5/4/20 at 14:31;  Status DC


Hydromorphone HCl (Dilaudid) 1 mg PRN Q4HRS  PRN IVP PAIN Last administered on 

5/18/20at 06:25;  Start 5/4/20 at 19:00;  Stop 5/18/20 at 17:10;  Status DC


Potassium Chloride 75 meq/ Magnesium Sulfate 15 meq/Calcium Gluconate 8 meq/ 

Multivitamins 10 ml/Chromium/ Copper/Manganese/ Seleni/Zn 0.5 ml/ Insulin Human 

Regular 20 unit/ Total Parenteral Nutrition/Amino Acids/Dextrose/ Fat Emulsion 

Intravenous 1,920 ml @  80 mls/hr TPN  CONT IV  Last administered on 5/5/20at 

22:10;  Start 5/5/20 at 22:00;  Stop 5/6/20 at 21:59;  Status DC


Lidocaine HCl (Buffered Lidocaine 1%) 3 ml STK-MED ONCE .ROUTE ;  Start 5/6/20 

at 11:31;  Stop 5/6/20 at 11:31;  Status DC


Lidocaine HCl (Buffered Lidocaine 1%) 3 ml STK-MED ONCE .ROUTE ;  Start 5/6/20 

at 12:28;  Stop 5/6/20 at 12:29;  Status DC


Lidocaine HCl (Buffered Lidocaine 1%) 6 ml 1X  ONCE INJ  Last administered on 

5/6/20at 12:53;  Start 5/6/20 at 12:45;  Stop 5/6/20 at 12:46;  Status DC


Potassium Chloride 75 meq/ Magnesium Sulfate 15 meq/Calcium Gluconate 8 meq/ 

Multivitamins 10 ml/Chromium/ Copper/Manganese/ Seleni/Zn 0.5 ml/ Insulin Human 

Regular 20 unit/ Total Parenteral Nutrition/Amino Acids/Dextrose/ Fat Emulsion 

Intravenous 1,920 ml @  80 mls/hr TPN  CONT IV  Last administered on 5/6/20at 

22:00;  Start 5/6/20 at 22:00;  Stop 5/7/20 at 21:59;  Status DC


Potassium Chloride 75 meq/ Magnesium Sulfate 15 meq/Calcium Gluconate 8 meq/ 

Multivitamins 10 ml/Chromium/ Copper/Manganese/ Seleni/Zn 0.5 ml/ Insulin Human 

Regular 15 unit/ Total Parenteral Nutrition/Amino Acids/Dextrose/ Fat Emulsion 

Intravenous 1,920 ml @  80 mls/hr TPN  CONT IV  Last administered on 5/7/20at 

22:28;  Start 5/7/20 at 22:00;  Stop 5/8/20 at 21:59;  Status DC


Vecuronium Bromide (Norcuron Bolus) 6 mg PRN Q6HRS  PRN IV VENT ASYNCHRONY;  

Start 5/7/20 at 19:15;  Stop 5/7/20 at 19:35;  Status DC


Bumetanide (Bumex) 2 mg 1X  ONCE IV  Last administered on 5/7/20at 22:09;  Start

5/7/20 at 19:45;  Stop 5/7/20 at 19:46;  Status DC


Lidocaine HCl (Buffered Lidocaine 1%) 3 ml STK-MED ONCE .ROUTE ;  Start 5/8/20 

at 07:59;  Stop 5/8/20 at 07:59;  Status DC


Midazolam HCl (Versed) 5 mg STK-MED ONCE .ROUTE ;  Start 5/8/20 at 08:36;  Stop 

5/8/20 at 08:36;  Status DC


Fentanyl Citrate (Fentanyl 5ml Vial) 250 mcg STK-MED ONCE .ROUTE ;  Start 5/8/20

at 08:36;  Stop 5/8/20 at 08:37;  Status DC


Lidocaine HCl (Buffered Lidocaine 1%) 3 ml 1X  ONCE IJ  Last administered on 

5/8/20at 09:30;  Start 5/8/20 at 09:15;  Stop 5/8/20 at 09:16;  Status DC


Midazolam HCl (Versed) 5 mg 1X  ONCE IV  Last administered on 5/8/20at 09:30;  

Start 5/8/20 at 09:15;  Stop 5/8/20 at 09:16;  Status DC


Fentanyl Citrate (Fentanyl 5ml Vial) 250 mcg 1X  ONCE IV  Last administered on 

5/8/20at 09:30;  Start 5/8/20 at 09:15;  Stop 5/8/20 at 09:16;  Status DC


Bumetanide (Bumex) 2 mg DAILY IV  Last administered on 5/18/20at 08:07;  Start 

5/8/20 at 10:00;  Stop 5/18/20 at 17:15;  Status DC


Potassium Chloride 75 meq/ Magnesium Sulfate 15 meq/ Multivitamins 10 

ml/Chromium/ Copper/Manganese/ Seleni/Zn 0.5 ml/ Insulin Human Regular 15 unit/ 

Total Parenteral Nutrition/Amino Acids/Dextrose/ Fat Emulsion Intravenous 1,920 

ml @  80 mls/hr TPN  CONT IV  Last administered on 5/8/20at 21:59;  Start 5/8/20

at 22:00;  Stop 5/9/20 at 21:59;  Status DC


Metoclopramide HCl (Reglan Vial) 10 mg PRN Q3HRS  PRN IVP NAUSEA/VOMITING-3rd 

choice Last administered on 5/14/20at 04:25;  Start 5/9/20 at 16:45


Potassium Chloride 75 meq/ Magnesium Sulfate 15 meq/ Multivitamins 10 

ml/Chromium/ Copper/Manganese/ Seleni/Zn 0.5 ml/ Insulin Human Regular 15 unit/ 

Total Parenteral Nutrition/Amino Acids/Dextrose/ Fat Emulsion Intravenous 1,920 

ml @  80 mls/hr TPN  CONT IV  Last administered on 5/9/20at 22:41;  Start 5/9/20

at 22:00;  Stop 5/10/20 at 21:59;  Status DC


Magnesium Sulfate 50 ml @ 25 mls/hr 1X  ONCE IV  Last administered on 5/10/20at 

10:44;  Start 5/10/20 at 09:00;  Stop 5/10/20 at 10:59;  Status DC


Potassium Chloride/Water 100 ml @  100 mls/hr 1X  ONCE IV  Last administered on 

5/10/20at 09:37;  Start 5/10/20 at 09:00;  Stop 5/10/20 at 09:59;  Status DC


Duloxetine HCl (Cymbalta) 30 mg DAILY PO  Last administered on 5/11/20at 09:48; 

Start 5/10/20 at 14:00;  Stop 5/13/20 at 10:25;  Status DC


Potassium Chloride 80 meq/ Magnesium Sulfate 20 meq/ Multivitamins 10 

ml/Chromium/ Copper/Manganese/ Seleni/Zn 0.5 ml/ Insulin Human Regular 15 unit/ 

Total Parenteral Nutrition/Amino Acids/Dextrose/ Fat Emulsion Intravenous 1,920 

ml @  80 mls/hr TPN  CONT IV  Last administered on 5/10/20at 21:42;  Start 

5/10/20 at 22:00;  Stop 5/11/20 at 21:59;  Status DC


Potassium Chloride 80 meq/ Magnesium Sulfate 20 meq/ Multivitamins 10 

ml/Chromium/ Copper/Manganese/ Seleni/Zn 0.5 ml/ Insulin Human Regular 15 unit/ 

Total Parenteral Nutrition/Amino Acids/Dextrose/ Fat Emulsion Intravenous 1,920 

ml @  80 mls/hr TPN  CONT IV  Last administered on 5/11/20at 22:20;  Start 

5/11/20 at 22:00;  Stop 5/12/20 at 21:59;  Status DC


Lidocaine HCl (Buffered Lidocaine 1%) 3 ml STK-MED ONCE .ROUTE ;  Start 5/12/20 

at 09:54;  Stop 5/12/20 at 09:55;  Status DC


Hydromorphone HCl (Dilaudid Standard PCA) 12 mg STK-MED ONCE IV ;  Start 5/1/20 

at 15:50;  Stop 5/12/20 at 11:24;  Status DC


Potassium Chloride 80 meq/ Magnesium Sulfate 20 meq/ Multivitamins 10 

ml/Chromium/ Copper/Manganese/ Seleni/Zn 0.5 ml/ Insulin Human Regular 15 unit/ 

Total Parenteral Nutrition/Amino Acids/Dextrose/ Fat Emulsion Intravenous 1,920 

ml @  80 mls/hr TPN  CONT IV  Last administered on 5/12/20at 21:40;  Start 

5/12/20 at 22:00;  Stop 5/13/20 at 21:59;  Status DC


Lidocaine HCl (Buffered Lidocaine 1%) 6 ml 1X  ONCE INJ  Last administered on 

5/12/20at 14:15;  Start 5/12/20 at 14:15;  Stop 5/12/20 at 14:16;  Status DC


Potassium Chloride 80 meq/ Magnesium Sulfate 20 meq/ Multivitamins 10 

ml/Chromium/ Copper/Manganese/ Seleni/Zn 1 ml/ Insulin Human Regular 15 unit/ 

Total Parenteral Nutrition/Amino Acids/Dextrose/ Fat Emulsion Intravenous 1,920 

ml @  80 mls/hr TPN  CONT IV  Last administered on 5/13/20at 22:04;  Start 

5/13/20 at 22:00;  Stop 5/14/20 at 21:59;  Status DC


Potassium Chloride/Water 100 ml @  100 mls/hr 1X  ONCE IV  Last administered on 

5/14/20at 11:34;  Start 5/14/20 at 11:00;  Stop 5/14/20 at 11:59;  Status DC


Potassium Chloride 90 meq/ Magnesium Sulfate 20 meq/ Multivitamins 10 

ml/Chromium/ Copper/Manganese/ Seleni/Zn 1 ml/ Insulin Human Regular 15 unit/ 

Total Parenteral Nutrition/Amino Acids/Dextrose/ Fat Emulsion Intravenous 1,920 

ml @  80 mls/hr TPN  CONT IV  Last administered on 5/14/20at 22:57;  Start 

5/14/20 at 22:00;  Stop 5/15/20 at 21:59;  Status DC


Potassium Chloride 90 meq/ Magnesium Sulfate 20 meq/ Multivitamins 10 

ml/Chromium/ Copper/Manganese/ Seleni/Zn 1 ml/ Insulin Human Regular 15 unit/ 

Total Parenteral Nutrition/Amino Acids/Dextrose/ Fat Emulsion Intravenous 1,920 

ml @  80 mls/hr TPN  CONT IV  Last administered on 5/15/20at 22:48;  Start 

5/15/20 at 22:00;  Stop 5/16/20 at 21:59;  Status DC


Potassium Chloride 90 meq/ Magnesium Sulfate 20 meq/ Multivitamins 10 

ml/Chromium/ Copper/Manganese/ Seleni/Zn 1 ml/ Insulin Human Regular 15 unit/ 

Total Parenteral Nutrition/Amino Acids/Dextrose/ Fat Emulsion Intravenous 1,890 

ml @  78.75 mls/ hr TPN  CONT IV  Last administered on 5/16/20at 22:15;  Start 

5/16/20 at 22:00;  Stop 5/17/20 at 21:59;  Status DC


Linezolid/Dextrose 300 ml @  300 mls/hr Q12HR IV  Last administered on 5/19/20at

21:08;  Start 5/17/20 at 09:00;  Stop 5/20/20 at 08:11;  Status DC


Daptomycin 450 mg/ Sodium Chloride 50 ml @  100 mls/hr Q24H IV  Last 

administered on 5/20/20at 09:25;  Start 5/17/20 at 09:00;  Stop 5/21/20 at 

08:30;  Status DC


Potassium Chloride 90 meq/ Magnesium Sulfate 20 meq/ Multivitamins 10 

ml/Chromium/ Copper/Manganese/ Seleni/Zn 1 ml/ Insulin Human Regular 15 unit/ 

Total Parenteral Nutrition/Amino Acids/Dextrose/ Fat Emulsion Intravenous 1,890 

ml @  78.75 mls/ hr TPN  CONT IV  Last administered on 5/17/20at 21:34;  Start 

5/17/20 at 22:00;  Stop 5/18/20 at 21:59;  Status DC


Lorazepam (Ativan Inj) 2 mg STK-MED ONCE .ROUTE ;  Start 5/17/20 at 14:58;  Stop

5/17/20 at 14:58;  Status DC


Metoprolol Tartrate (Lopressor Vial) 5 mg 1X  ONCE IVP  Last administered on 

5/17/20at 15:31;  Start 5/17/20 at 15:15;  Stop 5/17/20 at 15:16;  Status DC


Lorazepam (Ativan Inj) 2 mg 1X  ONCE IVP  Last administered on 5/17/20at 15:30; 

Start 5/17/20 at 15:15;  Stop 5/17/20 at 15:16;  Status DC


Enoxaparin Sodium (Lovenox 40mg Syringe) 40 mg Q24H SQ  Last administered on 

6/5/20at 17:44;  Start 5/17/20 at 17:00;  Stop 6/7/20 at 06:50;  Status DC


Lorazepam (Ativan Inj) 1 mg PRN Q4HRS  PRN IVP ANXIETY / AGITATION MILD-MOD Last

administered on 5/31/20at 15:55;  Start 5/17/20 at 19:15;  Stop 6/2/20 at 11:45;

 Status DC


Lorazepam (Ativan Inj) 2 mg PRN Q4HRS  PRN IVP ANXIETY / AGITATION SEVERE Last 

administered on 6/1/20at 07:55;  Start 5/17/20 at 19:15;  Stop 6/2/20 at 11:45; 

Status DC


Fentanyl Citrate (Fentanyl 2ml Vial) 50 mcg PRN Q4HRS  PRN IVP SEVERE PAIN Last 

administered on 6/13/20at 05:15;  Start 5/18/20 at 13:15


Fentanyl Citrate (Fentanyl 2ml Vial) 25 mcg PRN Q4HRS  PRN IVP MODERATE PAIN L

ast administered on 6/13/20at 00:27;  Start 5/18/20 at 13:15


Potassium Chloride 90 meq/ Magnesium Sulfate 20 meq/ Multivitamins 10 

ml/Chromium/ Copper/Manganese/ Seleni/Zn 1 ml/ Insulin Human Regular 15 unit/ 

Total Parenteral Nutrition/Amino Acids/Dextrose/ Fat Emulsion Intravenous 1,890 

ml @  78.75 mls/ hr TPN  CONT IV  Last administered on 5/18/20at 22:18;  Start 

5/18/20 at 22:00;  Stop 5/19/20 at 21:59;  Status DC


Furosemide (Lasix) 40 mg 1X  ONCE IVP  Last administered on 5/18/20at 21:51;  

Start 5/18/20 at 21:45;  Stop 5/18/20 at 21:48;  Status DC


Albumin Human 100 ml @  100 mls/hr 1X PRN  PRN IV SEE COMMENTS;  Start 5/19/20 

at 01:30


Furosemide (Lasix) 40 mg [id number] IVP  Last administered on 6/3/20at 08:04;  Start 

5/19/20 at 14:00;  Stop 6/3/20 at 13:07;  Status DC


Potassium Chloride 90 meq/ Magnesium Sulfate 20 meq/ Multivitamins 10 

ml/Chromium/ Copper/Manganese/ Seleni/Zn 1 ml/ Insulin Human Regular 15 unit/ 

Total Parenteral Nutrition/Amino Acids/Dextrose/ Fat Emulsion Intravenous 1,800 

ml @  75 mls/hr TPN  CONT IV  Last administered on 5/19/20at 22:31;  Start 

5/19/20 at 22:00;  Stop 5/20/20 at 21:59;  Status DC


Potassium Chloride 90 meq/ Magnesium Sulfate 20 meq/ Multivitamins 10 

ml/Chromium/ Copper/Manganese/ Seleni/Zn 1 ml/ Insulin Human Regular 15 unit/ 

Total Parenteral Nutrition/Amino Acids/Dextrose/ Fat Emulsion Intravenous 1,800 

ml @  75 mls/hr TPN  CONT IV  Last administered on 5/20/20at 22:28;  Start 

5/20/20 at 22:00;  Stop 5/21/20 at 21:59;  Status DC


Potassium Chloride 110 meq/ Magnesium Sulfate 20 meq/ Multivitamins 10 

ml/Chromium/ Copper/Manganese/ Seleni/Zn 1 ml/ Insulin Human Regular 15 unit/ 

Total Parenteral Nutrition/Amino Acids/Dextrose/ Fat Emulsion Intravenous 1,800 

ml @  75 mls/hr TPN  CONT IV  Last administered on 5/21/20at 22:01;  Start 

5/21/20 at 22:00;  Stop 5/22/20 at 21:59;  Status DC


Saliva Substitute (Biotene Moisturizing Mouth) 2 spray PRN Q15MIN  PRN PO DRY 

MOUTH;  Start 5/21/20 at 11:00


Potassium Chloride 110 meq/ Magnesium Sulfate 20 meq/ Multivitamins 10 

ml/Chromium/ Copper/Manganese/ Seleni/Zn 1 ml/ Insulin Human Regular 15 unit/ 

Total Parenteral Nutrition/Amino Acids/Dextrose/ Fat Emulsion Intravenous 1,800 

ml @  75 mls/hr TPN  CONT IV  Last administered on 5/22/20at 22:21;  Start 

5/22/20 at 22:00;  Stop 5/23/20 at 21:59;  Status DC


Potassium Chloride 110 meq/ Magnesium Sulfate 20 meq/ Multivitamins 10 

ml/Chromium/ Copper/Manganese/ Seleni/Zn 1 ml/ Insulin Human Regular 15 unit/ 

Total Parenteral Nutrition/Amino Acids/Dextrose/ Fat Emulsion Intravenous 1,800 

ml @  75 mls/hr TPN  CONT IV  Last administered on 5/23/20at 22:04;  Start 

5/23/20 at 22:00;  Stop 5/24/20 at 21:59;  Status DC


Potassium Chloride 110 meq/ Magnesium Sulfate 20 meq/ Multivitamins 10 

ml/Chromium/ Copper/Manganese/ Seleni/Zn 1 ml/ Insulin Human Regular 15 unit/ 

Total Parenteral Nutrition/Amino Acids/Dextrose/ Fat Emulsion Intravenous 1,800 

ml @  75 mls/hr TPN  CONT IV  Last administered on 5/24/20at 22:48;  Start 

5/24/20 at 22:00;  Stop 5/25/20 at 21:59;  Status DC


Potassium Chloride 70 meq/ Magnesium Sulfate 20 meq/ Multivitamins 10 

ml/Chromium/ Copper/Manganese/ Seleni/Zn 1 ml/ Insulin Human Regular 15 unit/ 

Total Parenteral Nutrition/Amino Acids/Dextrose/ Fat Emulsion Intravenous 1,800 

ml @  75 mls/hr TPN  CONT IV  Last administered on 5/25/20at 21:39;  Start 

5/25/20 at 22:00;  Stop 5/26/20 at 21:59;  Status DC


Meropenem 500 mg/ Sodium Chloride 50 ml @  100 mls/hr Q6HRS IV  Last admi

nistered on 5/27/20at 06:02;  Start 5/25/20 at 18:00;  Stop 5/27/20 at 09:59;  

Status DC


Barium Sulfate (Varibar Thin Liquid Apple) 148 gm 1X  ONCE PO ;  Start 5/26/20 

at 11:45;  Stop 5/26/20 at 11:49;  Status DC


Potassium Chloride 70 meq/ Magnesium Sulfate 20 meq/ Multivitamins 10 

ml/Chromium/ Copper/Manganese/ Seleni/Zn 1 ml/ Insulin Human Regular 15 unit/ 

Total Parenteral Nutrition/Amino Acids/Dextrose/ Fat Emulsion Intravenous 1,800 

ml @  75 mls/hr TPN  CONT IV  Last administered on 5/26/20at 22:27;  Start 

5/26/20 at 22:00;  Stop 5/27/20 at 21:59;  Status DC


Piperacillin Sod/ Tazobactam Sod 3.375 gm/Sodium Chloride 50 ml @  100 mls/hr 

Q6HRS IV  Last administered on 6/4/20at 06:10;  Start 5/27/20 at 12:00;  Stop 

6/4/20 at 07:26;  Status DC


Potassium Chloride 70 meq/ Magnesium Sulfate 20 meq/ Multivitamins 10 

ml/Chromium/ Copper/Manganese/ Seleni/Zn 1 ml/ Insulin Human Regular 15 unit/ 

Total Parenteral Nutrition/Amino Acids/Dextrose/ Fat Emulsion Intravenous 1,800 

ml @  75 mls/hr TPN  CONT IV  Last administered on 5/27/20at 22:03;  Start 

5/27/20 at 22:00;  Stop 5/28/20 at 21:59;  Status DC


Potassium Chloride 70 meq/ Magnesium Sulfate 20 meq/ Multivitamins 10 

ml/Chromium/ Copper/Manganese/ Seleni/Zn 1 ml/ Insulin Human Regular 15 unit/ 

Total Parenteral Nutrition/Amino Acids/Dextrose/ Fat Emulsion Intravenous 1,800 

ml @  75 mls/hr TPN  CONT IV  Last administered on 5/28/20at 22:33;  Start 

5/28/20 at 22:00;  Stop 5/29/20 at 21:59;  Status DC


Potassium Chloride 70 meq/ Magnesium Sulfate 20 meq/ Multivitamins 10 m

l/Chromium/ Copper/Manganese/ Seleni/Zn 1 ml/ Insulin Human Regular 15 unit/ 

Total Parenteral Nutrition/Amino Acids/Dextrose/ Fat Emulsion Intravenous 1,800 

ml @  75 mls/hr TPN  CONT IV  Last administered on 5/29/20at 23:13;  Start 

5/29/20 at 22:00;  Stop 5/30/20 at 21:59;  Status DC


Potassium Chloride 80 meq/ Magnesium Sulfate 20 meq/ Multivitamins 10 

ml/Chromium/ Copper/Manganese/ Seleni/Zn 1 ml/ Insulin Human Regular 15 unit/ 

Total Parenteral Nutrition/Amino Acids/Dextrose/ Fat Emulsion Intravenous 1,800 

ml @  75 mls/hr TPN  CONT IV  Last administered on 5/30/20at 22:30;  Start 

5/30/20 at 22:00;  Stop 5/31/20 at 21:59;  Status DC


Potassium Chloride 80 meq/ Magnesium Sulfate 20 meq/ Multivitamins 10 

ml/Chromium/ Copper/Manganese/ Seleni/Zn 1 ml/ Insulin Human Regular 15 unit/ 

Total Parenteral Nutrition/Amino Acids/Dextrose/ Fat Emulsion Intravenous 1,800 

ml @  75 mls/hr TPN  CONT IV  Last administered on 5/31/20at 21:54;  Start 

5/31/20 at 22:00;  Stop 6/1/20 at 21:59;  Status DC


Potassium Chloride/Water 100 ml @  100 mls/hr 1X  ONCE IV  Last administered on 

6/1/20at 10:15;  Start 6/1/20 at 10:00;  Stop 6/1/20 at 10:59;  Status DC


Potassium Chloride 90 meq/ Magnesium Sulfate 20 meq/ Multivitamins 10 

ml/Chromium/ Copper/Manganese/ Seleni/Zn 1 ml/ Insulin Human Regular 20 unit/ 

Total Parenteral Nutrition/Amino Acids/Dextrose/ Fat Emulsion Intravenous 1,800 

ml @  75 mls/hr TPN  CONT IV  Last administered on 6/1/20at 22:28;  Start 6/1/20

at 22:00;  Stop 6/2/20 at 21:59;  Status DC


Potassium Chloride 90 meq/ Magnesium Sulfate 20 meq/ Multivitamins 10 

ml/Chromium/ Copper/Manganese/ Seleni/Zn 1 ml/ Insulin Human Regular 20 unit/ 

Total Parenteral Nutrition/Amino Acids/Dextrose/ Fat Emulsion Intravenous 1,800 

ml @  75 mls/hr TPN  CONT IV  Last administered on 6/2/20at 22:08;  Start 6/2/20

at 22:00;  Stop 6/3/20 at 21:59;  Status DC


Lorazepam (Ativan Inj) 0.25 mg PRN Q4HRS  PRN IVP ANXIETY / AGITATION Last 

administered on 6/13/20at 02:25;  Start 6/3/20 at 07:30


Potassium Chloride 90 meq/ Magnesium Sulfate 20 meq/ Multivitamins 10 

ml/Chromium/ Copper/Manganese/ Seleni/Zn 1 ml/ Insulin Human Regular 20 unit/ 

Total Parenteral Nutrition/Amino Acids/Dextrose/ Fat Emulsion Intravenous 1,800 

ml @  75 mls/hr TPN  CONT IV  Last administered on 6/3/20at 23:13;  Start 6/3/20

at 22:00;  Stop 6/4/20 at 21:59;  Status DC


Furosemide (Lasix) 40 mg DAILY IVP  Last administered on 6/5/20at 11:14;  Start 

6/3/20 at 13:30;  Stop 6/7/20 at 09:12;  Status DC


Fluoxetine HCl (PROzac) 20 mg QHS PEG  Last administered on 6/13/20at 21:17;  

Start 6/4/20 at 21:00


Fentanyl (Duragesic 50mcg/ Hr Patch) 1 patch Q72H TD  Last administered on 

6/4/20at 21:22;  Start 6/4/20 at 21:00;  Stop 6/13/20 at 12:00;  Status DC


Potassium Chloride 40 meq/ Potassium Acetate 60 meq/Magnesium Sulfate 10 meq/ 

Multivitamins 10 ml/Chromium/ Copper/Manganese/ Seleni/Zn 1 ml/ Insulin Human 

Regular 20 unit/ Total Parenteral Nutrition/Amino Acids/Dextrose/ Fat Emulsion 

Intravenous 1,800 ml @  75 mls/hr TPN  CONT IV  Last administered on 6/5/20at 

00:03;  Start 6/4/20 at 22:00;  Stop 6/5/20 at 21:59;  Status DC


Potassium Acetate 80 meq/Magnesium Sulfate 5 meq/ Multivitamins 10 ml/Chromium/ 

Copper/Manganese/ Seleni/Zn 1 ml/ Insulin Human Regular 20 unit/ Total 

Parenteral Nutrition/Amino Acids/Dextrose/ Fat Emulsion Intravenous 1,920 ml @  

80 mls/hr TPN  CONT IV  Last administered on 6/5/20at 21:59;  Start 6/5/20 at 

22:00;  Stop 6/6/20 at 21:59;  Status DC


Potassium Acetate 60 meq/Magnesium Sulfate 5 meq/ Multivitamins 10 ml/Chromium/ 

Copper/Manganese/ Seleni/Zn 1 ml/ Insulin Human Regular 30 unit/ Total 

Parenteral Nutrition/Amino Acids/Dextrose/ Fat Emulsion Intravenous 1,920 ml @  

80 mls/hr TPN  CONT IV  Last administered on 6/6/20at 21:54;  Start 6/6/20 at 

22:00;  Stop 6/7/20 at 21:59;  Status DC


Norepinephrine Bitartrate 8 mg/ Dextrose 258 ml @  13.332 mls/ hr CONT  PRN IV 

PER PROTOCOL Last administered on 6/7/20at 21:46;  Start 6/7/20 at 06:30


Albumin Human 500 ml @  125 mls/hr 1X  ONCE IV  Last administered on 6/7/20at 

08:10;  Start 6/7/20 at 08:15;  Stop 6/7/20 at 12:14;  Status DC


Potassium Acetate 40 meq/Magnesium Sulfate 5 meq/ Multivitamins 10 ml/Chromium/ 

Copper/Manganese/ Seleni/Zn 1 ml/ Insulin Human Regular 30 unit/ Total 

Parenteral Nutrition/Amino Acids/Dextrose/ Fat Emulsion Intravenous 1,920 ml @  

80 mls/hr TPN  CONT IV  Last administered on 6/7/20at 22:23;  Start 6/7/20 at 

22:00;  Stop 6/8/20 at 21:59;  Status DC


Meropenem 1 gm/ Sodium Chloride 100 ml @  200 mls/hr Q8HRS IV ;  Start 6/7/20 at

14:00;  Status Cancel


Meropenem 1 gm/ Sodium Chloride 100 ml @  200 mls/hr Q8HRS IV  Last administered

on 6/7/20at 11:04;  Start 6/7/20 at 10:00;  Stop 6/7/20 at 13:00;  Status DC


Meropenem 1 gm/ Sodium Chloride 100 ml @  200 mls/hr Q12HR IV  Last administered

on 6/14/20at 08:58;  Start 6/7/20 at 21:00


Sodium Chloride 1,000 ml @  1,000 mls/hr 1X  ONCE IV  Last administered on 

6/7/20at 11:06;  Start 6/7/20 at 10:45;  Stop 6/7/20 at 11:44;  Status DC


Micafungin Sodium 100 mg/Dextrose 100 ml @  100 mls/hr Q24H IV  Last 

administered on 6/13/20at 11:16;  Start 6/7/20 at 11:00


Daptomycin 410 mg/ Sodium Chloride 50 ml @  100 mls/hr Q24H IV  Last 

administered on 6/9/20at 13:33;  Start 6/7/20 at 14:00;  Stop 6/10/20 at 08:30; 

Status DC


Midazolam HCl (Versed) 2 mg STK-MED ONCE .ROUTE ;  Start 6/7/20 at 14:47;  Stop 

6/7/20 at 14:48;  Status DC


Fentanyl Citrate (Fentanyl 2ml Vial) 100 mcg STK-MED ONCE .ROUTE ;  Start 6/7/20

at 14:47;  Stop 6/7/20 at 14:48;  Status DC


Flumazenil (Romazicon) 0.5 mg STK-MED ONCE IV ;  Start 6/7/20 at 14:48;  Stop 

6/7/20 at 14:48;  Status DC


Naloxone HCl (Narcan) 0.4 mg STK-MED ONCE .ROUTE ;  Start 6/7/20 at 14:48;  Stop

6/7/20 at 14:48;  Status DC


Lidocaine HCl (Lidocaine 1% 20ml Vial) 20 ml STK-MED ONCE .ROUTE ;  Start 6/7/20

at 14:48;  Stop 6/7/20 at 14:48;  Status DC


Midazolam HCl (Versed) 2 mg 1X  ONCE IV  Last administered on 6/7/20at 15:28;  

Start 6/7/20 at 15:00;  Stop 6/7/20 at 15:01;  Status DC


Fentanyl Citrate (Fentanyl 2ml Vial) 100 mcg 1X  ONCE IV  Last administered on 

6/7/20at 15:28;  Start 6/7/20 at 15:00;  Stop 6/7/20 at 15:01;  Status DC


Lidocaine HCl (Lidocaine 1% 20ml Vial) 20 ml 1X  ONCE INJ  Last administered on 

6/7/20at 15:30;  Start 6/7/20 at 15:00;  Stop 6/7/20 at 15:01;  Status DC


Sodium Chloride 1,000 ml @  100 mls/hr Q10H IV  Last administered on 6/14/20at 

05:18;  Start 6/7/20 at 20:00


Sodium Bicarbonate (Sodium Bicarb Adult 8.4% Syr) 50 meq 1X  ONCE IV  Last 

administered on 6/7/20at 21:47;  Start 6/7/20 at 22:00;  Stop 6/7/20 at 22:01;  

Status DC


Potassium Acetate 40 meq/Magnesium Sulfate 5 meq/ Multivitamins 10 ml/Chromium/ 

Copper/Manganese/ Seleni/Zn 1 ml/ Insulin Human Regular 30 unit/ Total 

Parenteral Nutrition/Amino Acids/Dextrose/ Fat Emulsion Intravenous 1,920 ml @  

80 mls/hr TPN  CONT IV  Last administered on 6/8/20at 22:28;  Start 6/8/20 at 

22:00;  Stop 6/9/20 at 21:59;  Status DC


Sodium Chloride 500 ml @  500 mls/hr 1X  ONCE IV  Last administered on 6/9/20at 

06:39;  Start 6/9/20 at 06:45;  Stop 6/9/20 at 07:44;  Status DC


Potassium Acetate 40 meq/Magnesium Sulfate 5 meq/ Multivitamins 10 ml/Chromium/ 

Copper/Manganese/ Seleni/Zn 1 ml/ Insulin Human Regular 30 unit/ Total 

Parenteral Nutrition/Amino Acids/Dextrose/ Fat Emulsion Intravenous 1,920 ml @  

80 mls/hr TPN  CONT IV  Last administered on 6/9/20at 22:03;  Start 6/9/20 at 

22:00;  Stop 6/10/20 at 21:59;  Status DC


Metoprolol Tartrate (Lopressor Vial) 5 mg PRN Q6HRS  PRN IVP HYPERTENSION Last 

administered on 6/13/20at 04:03;  Start 6/10/20 at 09:00


Potassium Acetate 40 meq/Magnesium Sulfate 5 meq/ Multivitamins 10 ml/Chromium/ 

Copper/Manganese/ Seleni/Zn 1 ml/ Insulin Human Regular 30 unit/ Total 

Parenteral Nutrition/Amino Acids/Dextrose/ Fat Emulsion Intravenous 1,920 ml @  

80 mls/hr TPN  CONT IV  Last administered on 6/10/20at 21:26;  Start 6/10/20 at 

22:00;  Stop 6/11/20 at 21:59;  Status DC


Potassium Acetate 40 meq/Magnesium Sulfate 5 meq/ Multivitamins 10 ml/Chromium/ 

Copper/Manganese/ Seleni/Zn 1 ml/ Insulin Human Regular 30 unit/ Total 

Parenteral Nutrition/Amino Acids/Dextrose/ Fat Emulsion Intravenous 1,920 ml @  

80 mls/hr TPN  CONT IV  Last administered on 6/11/20at 23:23;  Start 6/11/20 at 

22:00;  Stop 6/12/20 at 21:59;  Status DC


Potassium Acetate 40 meq/Magnesium Sulfate 5 meq/ Multivitamins 10 ml/Chromium/ 

Copper/Manganese/ Seleni/Zn 1 ml/ Insulin Human Regular 30 unit/ Total 

Parenteral Nutrition/Amino Acids/Dextrose/ Fat Emulsion Intravenous 1,920 ml @  

80 mls/hr TPN  CONT IV  Last administered on 6/12/20at 21:35;  Start 6/12/20 at 

22:00;  Stop 6/13/20 at 21:59;  Status DC


Furosemide (Lasix) 20 mg 1X  ONCE IVP  Last administered on 6/13/20at 06:26;  

Start 6/13/20 at 06:15;  Stop 6/13/20 at 06:16;  Status DC


Methylprednisolone Sodium Succinate (SOLU-Medrol 125MG VIAL) 125 mg 1X  ONCE IV 

Last administered on 6/13/20at 06:26;  Start 6/13/20 at 06:15;  Stop 6/13/20 at 

06:16;  Status DC


Albuterol/ Ipratropium (Duoneb) 3 ml Q4HRS NEB  Last administered on 6/14/20at 

08:23;  Start 6/13/20 at 08:00


Fentanyl Citrate 30 ml @ 0 mls/hr CONT  PRN IV SEE PROTOCOL Last administered on

6/14/20at 02:12;  Start 6/13/20 at 06:00


Propofol 100 ml @ 0 mls/hr CONT  PRN IV SEE PROTOCOL Last administered on 

6/13/20at 06:10;  Start 6/13/20 at 06:00


Fentanyl Citrate (Fentanyl 2ml Vial) 25 mcg PRN Q1HR  PRN IV SEE COMMENTS;  

Start 6/13/20 at 06:00


Fentanyl Citrate (Fentanyl 2ml Vial) 50 mcg PRN Q1HR  PRN IV SEE COMMENTS;  

Start 6/13/20 at 06:00


Chlorhexidine Gluconate (Peridex) 15 ml BID MM ;  Start 6/13/20 at 09:00;  Stop 

6/13/20 at 07:58;  Status DC


Potassium Acetate 40 meq/Magnesium Sulfate 5 meq/ Multivitamins 10 ml/Chromium/ 

Copper/Manganese/ Seleni/Zn 1 ml/ Insulin Human Regular 30 unit/ Total 

Parenteral Nutrition/Amino Acids/Dextrose/ Fat Emulsion Intravenous 1,920 ml @  

80 mls/hr TPN  CONT IV  Last administered on 6/13/20at 21:19;  Start 6/13/20 at 

22:00;  Stop 6/14/20 at 21:59


Acetylcysteine (Mucomyst 20% Resp Treatment) 600 mg BID NEB  Last administered 

on 6/14/20at 08:23;  Start 6/13/20 at 21:00


Magnesium Sulfate 100 ml @  25 mls/hr 1X  ONCE IV  Last administered on 

6/13/20at 15:48;  Start 6/13/20 at 15:45;  Stop 6/13/20 at 19:44;  Status DC





Active Scripts


Active


Reported


Bisoprolol Fumarate 5 Mg Tablet 10 Mg PO DAILY


Vitals/I & O





Vital Sign - Last 24 Hours








 6/13/20 6/13/20 6/13/20 6/13/20





 10:00 11:00 11:36 12:00


 


Pulse 96 95  97


 


Resp 20 20  


 


B/P (MAP) 120/67 (84) 134/75 (94)  


 


Pulse Ox 99 99 99 


 


O2 Delivery Ventilator Ventilator Ventilator 





 6/13/20 6/13/20 6/13/20 6/13/20





 12:00 12:00 13:00 13:17


 


Temp 99.0   





 99.0   


 


Pulse 103  91 


 


Resp 20  20 


 


B/P (MAP) 106/62 (77)  108/62 (77) 


 


Pulse Ox 100  99 99


 


O2 Delivery Ventilator Mechanical Ventilator Ventilator Ventilator


 


    





    





 6/13/20 6/13/20 6/13/20 6/13/20





 14:00 15:00 15:44 15:49


 


Pulse 93 94  


 


Resp 20 20  


 


B/P (MAP) 92/53 (66) 120/68 (85)  


 


Pulse Ox 98 100 99 


 


O2 Delivery Ventilator Ventilator Ventilator Mechanical Ventilator





 6/13/20 6/13/20 6/13/20 6/13/20





 15:49 16:00 17:00 17:15


 


Temp  98.0  98.8





  98.0  98.8


 


Pulse 91 90 92 92


 


Resp  20 20 20


 


B/P (MAP)  110/61 (77) 112/62 (79) 116/93


 


Pulse Ox  100 100 


 


O2 Delivery  Ventilator Ventilator 


 


    





    





 6/13/20 6/13/20 6/13/20 6/13/20





 17:30 17:59 18:00 18:19


 


Temp 98.0   98.3





 98.0   98.3


 


Pulse 93  94 82


 


Resp 20  20 20


 


B/P (MAP) 116/64  114/66 (82) 110/58


 


Pulse Ox  100 100 


 


O2 Delivery  Ventilator Ventilator 


 


    





    





 6/13/20 6/13/20 6/13/20 6/13/20





 19:00 19:19 20:00 20:20


 


Temp 98.6 98.6  





 98.6 98.6  


 


Pulse 82 82 82 


 


Resp 20 20 20 


 


B/P (MAP) 110/62 (78) 110/62 112/62 (79) 


 


Pulse Ox 99  98 


 


O2 Delivery Ventilator  Ventilator Mechanical Ventilator


 


    





    





 6/13/20 6/13/20 6/13/20 6/13/20





 20:20 20:30 20:40 21:00


 


Temp   98.3 





   98.3 


 


Pulse 81  84 86


 


Resp   20 20


 


B/P (MAP)   146/77 116/62 (80)


 


Pulse Ox  98  97


 


O2 Delivery  Ventilator  Ventilator


 


    





    





 6/13/20 6/13/20 6/13/20 6/14/20





 22:00 22:15 23:00 00:00


 


Temp   97.3 





   97.3 


 


Pulse 80  83 79


 


Resp 20  20 20


 


B/P (MAP) 108/60 (76)  131/69 (89) 119/65 (83)


 


Pulse Ox 97 98 100 99


 


O2 Delivery Ventilator Ventilator Ventilator Ventilator


 


    





    





 6/14/20 6/14/20 6/14/20 6/14/20





 00:05 00:35 01:00 02:00


 


Pulse   90 80


 


Resp   20 20


 


B/P (MAP)   128/68 (88) 118/64 (82)


 


Pulse Ox  100 100 100


 


O2 Delivery Mechanical Ventilator Ventilator Ventilator Ventilator





 6/14/20 6/14/20 6/14/20 6/14/20





 03:00 03:10 04:00 04:15


 


Temp   97.5 





   97.5 


 


Pulse 80  72 64


 


Resp 20  20 


 


B/P (MAP) 118/64 (82)  118/64 (82) 


 


Pulse Ox 100 98 100 


 


O2 Delivery Ventilator Ventilator Ventilator 


 


    





    





 6/14/20 6/14/20 6/14/20 6/14/20





 04:15 05:00 05:05 06:00


 


Pulse  66  62


 


Resp  20  20


 


B/P (MAP)  111/59 (76)  118/64 (82)


 


Pulse Ox  100 99 100


 


O2 Delivery Mechanical Ventilator Ventilator Ventilator Ventilator





 6/14/20 6/14/20 6/14/20 6/14/20





 07:00 07:56 07:56 08:00


 


Temp    97.6





    97.6


 


Pulse 60 59  65


 


Resp 20   20


 


B/P (MAP) 114/58 (76)   140/67 (91)


 


Pulse Ox 100   100


 


O2 Delivery Ventilator  Mechanical Ventilator Ventilator


 


    





    





 6/14/20 6/14/20  





 08:23 09:00  


 


Pulse  69  


 


Resp  20  


 


B/P (MAP)  119/60 (79)  


 


Pulse Ox 100 99  


 


O2 Delivery Ventilator Ventilator  














Intake and Output   


 


 6/13/20 6/13/20 6/14/20





 15:00 23:00 07:00


 


Intake Total  100 ml 3319 ml


 


Output Total 1448 ml 861 ml 365 ml


 


Balance -1448 ml -761 ml 2954 ml











Hemodynamically unstable?:  No


Is patient in severe pain?:  No


Is NPO status required?:  No











GAETANO GONCALVES MD        Jun 14, 2020 09:32

## 2020-06-14 NOTE — NUR
Nursing Note:

Pt only measured output on ROBERT drains were from the right side despite flushing every drain 3 
times this shift. Left ROBERT site oozed considerable amount, early in shift, changed dressing. 
Pt comfortable on vent tonight able to wake up and follow commands easily.

## 2020-06-14 NOTE — PDOC
PULMONARY PROGRESS NOTES


Subjective


back on vent 6/13, now on peep 5, fi02 35%, large secretion, on low dose 

propofol, open eyes w verbal stimuli


Patient intubated on 3/23 , s/p trach 4/6, 


transferred back to ICU 6/5 for syncope with collapse


developed anemia, blood drainage from RLQ abdomen drain site, and surrounding 

firmness  / developed septic shock 6/7 from abdomen source, required levo 6/7


s/p 3 new drains 6/7 with brown color drainage


was place on AVAPS 6/7 post  


continues to have Dark blood / bloody drainage from ROBERT drains, 1500 cc in past 

24 hrs . off pressor


Vitals





Vital Signs








  Date Time  Temp Pulse Resp B/P (MAP) Pulse Ox O2 Delivery O2 Flow Rate FiO2


 


6/14/20 09:00  69 20 119/60 (79) 99 Ventilator  


 


6/14/20 08:00 97.6       





 97.6       








Comments


ros  unable to obtain


General:  Lethargic


HEENT:  Other (nc at perrl   nose clear    neck  trach site ok   no lab  no 

thyromegaly)


Lungs:  Other (diminshed in bases, Rhonci in LLL)


Cardiovascular:  S1, S2


Abdomen:  Soft, Non-tender, Other (bleeding from Sx. site RLQ and firmness)


Extremities:  Other (+1 BLE edema)


Skin:  Warm, Dry


Labs





Laboratory Tests








Test


 6/12/20


12:05 6/12/20


12:45 6/12/20


17:11 6/12/20


23:56


 


Glucose (Fingerstick)


 158 mg/dL


(70-99) 


 145 mg/dL


(70-99) 140 mg/dL


(70-99)


 


White Blood Count


 


 7.0 x10^3/uL


(4.0-11.0) 


 





 


Red Blood Count


 


 2.99 x10^6/uL


(3.50-5.40) 


 





 


Hemoglobin


 


 8.5 g/dL


(12.0-15.5) 


 





 


Hematocrit


 


 25.7 %


(36.0-47.0) 


 





 


Mean Corpuscular Volume  86 fL ()   


 


Mean Corpuscular Hemoglobin  29 pg (25-35)   


 


Mean Corpuscular Hemoglobin


Concent 


 33 g/dL


(31-37) 


 





 


Red Cell Distribution Width


 


 19.7 %


(11.5-14.5) 


 





 


Platelet Count


 


 347 x10^3/uL


(140-400) 


 





 


Test


 6/13/20


04:00 6/13/20


05:20 6/13/20


07:50 6/13/20


11:24


 


White Blood Count


 9.4 x10^3/uL


(4.0-11.0) 


 


 





 


Red Blood Count


 3.27 x10^6/uL


(3.50-5.40) 


 


 





 


Hemoglobin


 9.2 g/dL


(12.0-15.5) 


 


 





 


Hematocrit


 27.7 %


(36.0-47.0) 


 


 





 


Mean Corpuscular Volume 85 fL ()    


 


Mean Corpuscular Hemoglobin 28 pg (25-35)    


 


Mean Corpuscular Hemoglobin


Concent 33 g/dL


(31-37) 


 


 





 


Red Cell Distribution Width


 20.1 %


(11.5-14.5) 


 


 





 


Platelet Count


 433 x10^3/uL


(140-400) 


 


 





 


Neutrophils (%) (Auto) 76 % (31-73)    


 


Lymphocytes (%) (Auto) 17 % (24-48)    


 


Monocytes (%) (Auto) 5 % (0-9)    


 


Eosinophils (%) (Auto) 2 % (0-3)    


 


Basophils (%) (Auto) 0 % (0-3)    


 


Neutrophils # (Auto)


 7.2 x10^3/uL


(1.8-7.7) 


 


 





 


Lymphocytes # (Auto)


 1.6 x10^3/uL


(1.0-4.8) 


 


 





 


Monocytes # (Auto)


 0.5 x10^3/uL


(0.0-1.1) 


 


 





 


Eosinophils # (Auto)


 0.2 x10^3/uL


(0.0-0.7) 


 


 





 


Basophils # (Auto)


 0.0 x10^3/uL


(0.0-0.2) 


 


 





 


Sodium Level


 146 mmol/L


(136-145) 


 


 





 


Potassium Level


 3.8 mmol/L


(3.5-5.1) 


 


 





 


Chloride Level


 112 mmol/L


() 


 


 





 


Carbon Dioxide Level


 24 mmol/L


(21-32) 


 


 





 


Anion Gap 10 (6-14)    


 


Blood Urea Nitrogen


 25 mg/dL


(7-20) 


 


 





 


Creatinine


 0.7 mg/dL


(0.6-1.0) 


 


 





 


Estimated GFR


(Cockcroft-Gault) 88.9 


 


 


 





 


Glucose Level


 151 mg/dL


(70-99) 


 


 





 


Calcium Level


 10.1 mg/dL


(8.5-10.1) 


 


 





 


O2 Saturation  89 % (92-99)  99 % (92-99)  


 


Arterial Blood pH


 


 7.29


(7.35-7.45) 7.39


(7.35-7.45) 





 


Arterial Blood pCO2 at


Patient Temp 


 50 mmHg


(35-46) 38 mmHg


(35-46) 





 


Arterial Blood pO2 at Patient


Temp 


 64 mmHg


() 177 mmHg


() 





 


Arterial Blood HCO3


 


 24 mmol/L


(21-28) 23 mmol/L


(21-28) 





 


Arterial Blood Base Excess


 


 -3 mmol/L


(-3-3) -2 mmol/L


(-3-3) 





 


FiO2   75  


 


Glucose (Fingerstick)


 


 


 


 187 mg/dL


(70-99)


 


Test


 6/13/20


15:08 6/13/20


18:19 6/13/20


20:10 6/14/20


00:38


 


White Blood Count


 9.3 x10^3/uL


(4.0-11.0) 


 8.4 x10^3/uL


(4.0-11.0) 





 


Red Blood Count


 2.78 x10^6/uL


(3.50-5.40) 


 3.04 x10^6/uL


(3.50-5.40) 





 


Hemoglobin


 8.1 g/dL


(12.0-15.5) 


 9.0 g/dL


(12.0-15.5) 





 


Hematocrit


 23.5 %


(36.0-47.0) 


 26.0 %


(36.0-47.0) 





 


Mean Corpuscular Volume 85 fL ()   86 fL ()  


 


Mean Corpuscular Hemoglobin 29 pg (25-35)   30 pg (25-35)  


 


Mean Corpuscular Hemoglobin


Concent 34 g/dL


(31-37) 


 35 g/dL


(31-37) 





 


Red Cell Distribution Width


 19.1 %


(11.5-14.5) 


 18.7 %


(11.5-14.5) 





 


Platelet Count


 380 x10^3/uL


(140-400) 


 388 x10^3/uL


(140-400) 





 


Magnesium Level


 1.5 mg/dL


(1.8-2.4) 


 


 





 


Glucose (Fingerstick)


 


 220 mg/dL


(70-99) 


 188 mg/dL


(70-99)


 


Test


 6/14/20


05:30 


 


 





 


White Blood Count


 7.5 x10^3/uL


(4.0-11.0) 


 


 





 


Red Blood Count


 2.95 x10^6/uL


(3.50-5.40) 


 


 





 


Hemoglobin


 8.6 g/dL


(12.0-15.5) 


 


 





 


Hematocrit


 25.0 %


(36.0-47.0) 


 


 





 


Mean Corpuscular Volume 85 fL ()    


 


Mean Corpuscular Hemoglobin 29 pg (25-35)    


 


Mean Corpuscular Hemoglobin


Concent 34 g/dL


(31-37) 


 


 





 


Red Cell Distribution Width


 18.0 %


(11.5-14.5) 


 


 





 


Platelet Count


 382 x10^3/uL


(140-400) 


 


 





 


Neutrophils (%) (Auto) 79 % (31-73)    


 


Lymphocytes (%) (Auto) 17 % (24-48)    


 


Monocytes (%) (Auto) 4 % (0-9)    


 


Eosinophils (%) (Auto) 0 % (0-3)    


 


Basophils (%) (Auto) 0 % (0-3)    


 


Neutrophils # (Auto)


 5.9 x10^3/uL


(1.8-7.7) 


 


 





 


Lymphocytes # (Auto)


 1.3 x10^3/uL


(1.0-4.8) 


 


 





 


Monocytes # (Auto)


 0.3 x10^3/uL


(0.0-1.1) 


 


 





 


Eosinophils # (Auto)


 0.0 x10^3/uL


(0.0-0.7) 


 


 





 


Basophils # (Auto)


 0.0 x10^3/uL


(0.0-0.2) 


 


 





 


Sodium Level


 145 mmol/L


(136-145) 


 


 





 


Potassium Level


 3.8 mmol/L


(3.5-5.1) 


 


 





 


Chloride Level


 113 mmol/L


() 


 


 





 


Carbon Dioxide Level


 24 mmol/L


(21-32) 


 


 





 


Anion Gap 8 (6-14)    


 


Blood Urea Nitrogen


 36 mg/dL


(7-20) 


 


 





 


Creatinine


 0.8 mg/dL


(0.6-1.0) 


 


 





 


Estimated GFR


(Cockcroft-Gault) 76.2 


 


 


 





 


BUN/Creatinine Ratio 45 (6-20)    


 


Glucose Level


 227 mg/dL


(70-99) 


 


 





 


Calcium Level


 9.2 mg/dL


(8.5-10.1) 


 


 





 


Phosphorus Level


 3.7 mg/dL


(2.6-4.7) 


 


 





 


Magnesium Level


 2.5 mg/dL


(1.8-2.4) 


 


 





 


Total Bilirubin


 0.4 mg/dL


(0.2-1.0) 


 


 





 


Aspartate Amino Transf


(AST/SGOT) 31 U/L (15-37) 


 


 


 





 


Alanine Aminotransferase


(ALT/SGPT) 29 U/L (14-59) 


 


 


 





 


Alkaline Phosphatase


 82 U/L


() 


 


 





 


Total Protein


 4.6 g/dL


(6.4-8.2) 


 


 





 


Albumin


 1.0 g/dL


(3.4-5.0) 


 


 





 


Albumin/Globulin Ratio 0.3 (1.0-1.7)    








Laboratory Tests








Test


 6/13/20


11:24 6/13/20


15:08 6/13/20


18:19 6/13/20


20:10


 


Glucose (Fingerstick)


 187 mg/dL


(70-99) 


 220 mg/dL


(70-99) 





 


White Blood Count


 


 9.3 x10^3/uL


(4.0-11.0) 


 8.4 x10^3/uL


(4.0-11.0)


 


Red Blood Count


 


 2.78 x10^6/uL


(3.50-5.40) 


 3.04 x10^6/uL


(3.50-5.40)


 


Hemoglobin


 


 8.1 g/dL


(12.0-15.5) 


 9.0 g/dL


(12.0-15.5)


 


Hematocrit


 


 23.5 %


(36.0-47.0) 


 26.0 %


(36.0-47.0)


 


Mean Corpuscular Volume  85 fL ()   86 fL () 


 


Mean Corpuscular Hemoglobin  29 pg (25-35)   30 pg (25-35) 


 


Mean Corpuscular Hemoglobin


Concent 


 34 g/dL


(31-37) 


 35 g/dL


(31-37)


 


Red Cell Distribution Width


 


 19.1 %


(11.5-14.5) 


 18.7 %


(11.5-14.5)


 


Platelet Count


 


 380 x10^3/uL


(140-400) 


 388 x10^3/uL


(140-400)


 


Magnesium Level


 


 1.5 mg/dL


(1.8-2.4) 


 





 


Test


 6/14/20


00:38 6/14/20


05:30 


 





 


Glucose (Fingerstick)


 188 mg/dL


(70-99) 


 


 





 


White Blood Count


 


 7.5 x10^3/uL


(4.0-11.0) 


 





 


Red Blood Count


 


 2.95 x10^6/uL


(3.50-5.40) 


 





 


Hemoglobin


 


 8.6 g/dL


(12.0-15.5) 


 





 


Hematocrit


 


 25.0 %


(36.0-47.0) 


 





 


Mean Corpuscular Volume  85 fL ()   


 


Mean Corpuscular Hemoglobin  29 pg (25-35)   


 


Mean Corpuscular Hemoglobin


Concent 


 34 g/dL


(31-37) 


 





 


Red Cell Distribution Width


 


 18.0 %


(11.5-14.5) 


 





 


Platelet Count


 


 382 x10^3/uL


(140-400) 


 





 


Neutrophils (%) (Auto)  79 % (31-73)   


 


Lymphocytes (%) (Auto)  17 % (24-48)   


 


Monocytes (%) (Auto)  4 % (0-9)   


 


Eosinophils (%) (Auto)  0 % (0-3)   


 


Basophils (%) (Auto)  0 % (0-3)   


 


Neutrophils # (Auto)


 


 5.9 x10^3/uL


(1.8-7.7) 


 





 


Lymphocytes # (Auto)


 


 1.3 x10^3/uL


(1.0-4.8) 


 





 


Monocytes # (Auto)


 


 0.3 x10^3/uL


(0.0-1.1) 


 





 


Eosinophils # (Auto)


 


 0.0 x10^3/uL


(0.0-0.7) 


 





 


Basophils # (Auto)


 


 0.0 x10^3/uL


(0.0-0.2) 


 





 


Sodium Level


 


 145 mmol/L


(136-145) 


 





 


Potassium Level


 


 3.8 mmol/L


(3.5-5.1) 


 





 


Chloride Level


 


 113 mmol/L


() 


 





 


Carbon Dioxide Level


 


 24 mmol/L


(21-32) 


 





 


Anion Gap  8 (6-14)   


 


Blood Urea Nitrogen


 


 36 mg/dL


(7-20) 


 





 


Creatinine


 


 0.8 mg/dL


(0.6-1.0) 


 





 


Estimated GFR


(Cockcroft-Gault) 


 76.2 


 


 





 


BUN/Creatinine Ratio  45 (6-20)   


 


Glucose Level


 


 227 mg/dL


(70-99) 


 





 


Calcium Level


 


 9.2 mg/dL


(8.5-10.1) 


 





 


Phosphorus Level


 


 3.7 mg/dL


(2.6-4.7) 


 





 


Magnesium Level


 


 2.5 mg/dL


(1.8-2.4) 


 





 


Total Bilirubin


 


 0.4 mg/dL


(0.2-1.0) 


 





 


Aspartate Amino Transf


(AST/SGOT) 


 31 U/L (15-37) 


 


 





 


Alanine Aminotransferase


(ALT/SGPT) 


 29 U/L (14-59) 


 


 





 


Alkaline Phosphatase


 


 82 U/L


() 


 





 


Total Protein


 


 4.6 g/dL


(6.4-8.2) 


 





 


Albumin


 


 1.0 g/dL


(3.4-5.0) 


 





 


Albumin/Globulin Ratio  0.3 (1.0-1.7)   








Medications





Active Scripts








 Medications  Dose


 Route/Sig


 Max Daily Dose Days Date Category


 


 Bisoprolol


 Fumarate 5 Mg


 Tablet  10 Mg


 PO DAILY


   3/16/20 Reported








Comments


CXR 6/14/2020


 


trach  infilt effusion atelectasis on l side improved


r effusion/atelectasis














ct abdomen /pelvis 6/6


1. Removal of the percutaneous pigtail drainage catheters since the prior 


exam. Sequela of pancreatitis with extensive pseudocysts again 


demonstrated, the right-sided collections are slightly larger since the 


prior exam, the left-sided collections are stable. See above.


2. Moderate to large left pleural effusion with atelectasis and collapse 


of most of the left lower lobe, stable. Small right pleural effusion is 


stable.


3. Gallstone.





Impression


.


IMPRESSION:


1.  Acute hypoxemic respiratory failure secondary to ARDS status post trach, 

developed anemia 6/7, blood drainage from RLQ abdomen drain site, and 

surrounding firmness  / developed septic shock 6/7 from abdomen source, required

levo 6/7


s/p 3 new drains 6/7 with brown color drainage, back on vent 6/13, Bloody 

drainage again from ROBERT drains / 1500 cc in last 24 hr 


2.  Gallstone pancreatitis, now with ongoing bleeding from prior drain. Anemic. 

s/p Tx multiple units over several days


3.  septic shock/sepsis, recurrent 6/7, source abdomen. , off levo now, 


4.  Acute kidney injury-, Off HD--renal function decling. suspect JED on CKD due

to hypotension , improved now


5.  Acute gallstone pancreatitis.


6.  Hypoalbuminemia.


7.  Moderate persistent effusions, s/p left thora  5/12, reaccumulation of left 

effusion. O2 requirement not changed. 


8.  Fever- ,hypotension. suspect recurrent sepsis/ likely pancreatic source.  

Per ID, per surgery--


9.  Chronic anemia-- ongoing / s/p PRBC


10. Covid 19 testing negative


11. Moderate to large ascites-S/P paracentisis


12.S/P paracentisis with 4 liters removed on 4/15/20


13. S/P IR drain placement on 5/8/2020, removal


14. Depression/Anxiety 


1





Plan


.


1. back on vent 6/13, cxr reviewed, left effusion/infilt/atelectasis, suspect 

most of it is effusion, BD and mucumyst added, cxr reviewed, improved, will 

monitor, no need for thoracentesis, no need for bronch, abg reviewed, titrate 

fio2 to keep sat 94%, cont vent support


2. Follow all cultures/ PSA from pelvic drains. Abx per ID


3. Follow surgery recs-- Acute pancreatitis with persistent necrosis ---- 4/27 

status post ROBERT drain placement + C paropsilosis; 5/6 + yeast & high amylase; s/p

additional drains on 5/8, removal of drains and now increase pseudocyst and 

increase fluid collection. likely infected fluid/ sepsis. continues to have 

bloody drainage thru drains . 90 cc in last 24 hrs .Initiated BS abx.follow 

surgery rec, planning surgical intervention next few weeks


4. Follow ID recs for ABX----


5. Follow nephrology recs -----


6. Continue TPN 


7. monitor HGB,  4 units since 6/6--monitor HGB transfuse if less than 8.5 


8. hold off on thoracentesis . effusion small to moderate , monitor for now


10. s/p  thoracentesis, 5/12, 3 litres removed


11. Pt remains critically ill from severe necrotic pancreatis. Even with surgery

prognosis grim. Surgery informed family.





     


DVT/GI PPX: protonix--- holding lovenox 2/2 anemia


PT/OT


D/W RN, rt and dr huffman, 





critically ill    cc time 30 min no overlap











MAN BLAKE MD               Jun 14, 2020 09:56

## 2020-06-14 NOTE — PDOC
Infectious Disease Note


Subjective


Subjective


Patient lethargic, weak back on vent


Small vol vomit


Continues to have blood stained drainage from drains


no fevers last 24 hours





Vital Sign


Vital Signs





Vital Signs








  Date Time  Temp Pulse Resp B/P (MAP) Pulse Ox O2 Delivery O2 Flow Rate FiO2


 


6/14/20 05:05     99 Ventilator  


 


6/14/20 05:00  66 20 111/59 (76)    


 


6/14/20 04:00 97.5       





 97.5       











Physical Exam


PHYSICAL EXAM


GENERAL: Lethargic, opens eyes transiently


HEENT: NGT in place. Oral mucosa dry


NECK:  Tracheostomy 


LUNGS: Diminished aeration bases, no accessory muscle use 


HEART:  S1, S2, tachy, regular 


ABDOMEN: Mild distention, bowel sounds present, soft, grimaces to palpation 


Right lateral side drainage bag, 3 drains with bloodstained drainage


: [place] ( 6/ 7)


EXTREMITIES: Trace edema .no  cyanosis 


SKIN: no signs of gen rash 


CNS: Lethargic very weak


LUE-PICC (5/29) without signs of complications - art line without complications





Labs


Lab





Laboratory Tests








Test


 6/13/20


07:50 6/13/20


11:24 6/13/20


15:08 6/13/20


18:19


 


O2 Saturation 99 % (92-99)    


 


Arterial Blood pH


 7.39


(7.35-7.45) 


 


 





 


Arterial Blood pCO2 at


Patient Temp 38 mmHg


(35-46) 


 


 





 


Arterial Blood pO2 at Patient


Temp 177 mmHg


() 


 


 





 


Arterial Blood HCO3


 23 mmol/L


(21-28) 


 


 





 


Arterial Blood Base Excess


 -2 mmol/L


(-3-3) 


 


 





 


FiO2 75    


 


Glucose (Fingerstick)


 


 187 mg/dL


(70-99) 


 220 mg/dL


(70-99)


 


White Blood Count


 


 


 9.3 x10^3/uL


(4.0-11.0) 





 


Red Blood Count


 


 


 2.78 x10^6/uL


(3.50-5.40) 





 


Hemoglobin


 


 


 8.1 g/dL


(12.0-15.5) 





 


Hematocrit


 


 


 23.5 %


(36.0-47.0) 





 


Mean Corpuscular Volume   85 fL ()  


 


Mean Corpuscular Hemoglobin   29 pg (25-35)  


 


Mean Corpuscular Hemoglobin


Concent 


 


 34 g/dL


(31-37) 





 


Red Cell Distribution Width


 


 


 19.1 %


(11.5-14.5) 





 


Platelet Count


 


 


 380 x10^3/uL


(140-400) 





 


Magnesium Level


 


 


 1.5 mg/dL


(1.8-2.4) 





 


Test


 6/13/20


20:10 6/14/20


00:38 6/14/20


05:30 





 


White Blood Count


 8.4 x10^3/uL


(4.0-11.0) 


 7.5 x10^3/uL


(4.0-11.0) 





 


Red Blood Count


 3.04 x10^6/uL


(3.50-5.40) 


 2.95 x10^6/uL


(3.50-5.40) 





 


Hemoglobin


 9.0 g/dL


(12.0-15.5) 


 8.6 g/dL


(12.0-15.5) 





 


Hematocrit


 26.0 %


(36.0-47.0) 


 25.0 %


(36.0-47.0) 





 


Mean Corpuscular Volume 86 fL ()   85 fL ()  


 


Mean Corpuscular Hemoglobin 30 pg (25-35)   29 pg (25-35)  


 


Mean Corpuscular Hemoglobin


Concent 35 g/dL


(31-37) 


 34 g/dL


(31-37) 





 


Red Cell Distribution Width


 18.7 %


(11.5-14.5) 


 18.0 %


(11.5-14.5) 





 


Platelet Count


 388 x10^3/uL


(140-400) 


 382 x10^3/uL


(140-400) 





 


Glucose (Fingerstick)


 


 188 mg/dL


(70-99) 


 





 


Neutrophils (%) (Auto)   79 % (31-73)  


 


Lymphocytes (%) (Auto)   17 % (24-48)  


 


Monocytes (%) (Auto)   4 % (0-9)  


 


Eosinophils (%) (Auto)   0 % (0-3)  


 


Basophils (%) (Auto)   0 % (0-3)  


 


Neutrophils # (Auto)


 


 


 5.9 x10^3/uL


(1.8-7.7) 





 


Lymphocytes # (Auto)


 


 


 1.3 x10^3/uL


(1.0-4.8) 





 


Monocytes # (Auto)


 


 


 0.3 x10^3/uL


(0.0-1.1) 





 


Eosinophils # (Auto)


 


 


 0.0 x10^3/uL


(0.0-0.7) 





 


Basophils # (Auto)


 


 


 0.0 x10^3/uL


(0.0-0.2) 





 


Sodium Level


 


 


 145 mmol/L


(136-145) 





 


Potassium Level


 


 


 3.8 mmol/L


(3.5-5.1) 





 


Chloride Level


 


 


 113 mmol/L


() 





 


Carbon Dioxide Level


 


 


 24 mmol/L


(21-32) 





 


Anion Gap   8 (6-14)  


 


Blood Urea Nitrogen


 


 


 36 mg/dL


(7-20) 





 


Creatinine


 


 


 0.8 mg/dL


(0.6-1.0) 





 


Estimated GFR


(Cockcroft-Gault) 


 


 76.2 


 





 


BUN/Creatinine Ratio   45 (6-20)  


 


Glucose Level


 


 


 227 mg/dL


(70-99) 





 


Calcium Level


 


 


 9.2 mg/dL


(8.5-10.1) 





 


Phosphorus Level


 


 


 3.7 mg/dL


(2.6-4.7) 





 


Magnesium Level


 


 


 2.5 mg/dL


(1.8-2.4) 





 


Total Bilirubin


 


 


 0.4 mg/dL


(0.2-1.0) 





 


Aspartate Amino Transf


(AST/SGOT) 


 


 31 U/L (15-37) 


 





 


Alanine Aminotransferase


(ALT/SGPT) 


 


 29 U/L (14-59) 


 





 


Alkaline Phosphatase


 


 


 82 U/L


() 





 


Total Protein


 


 


 4.6 g/dL


(6.4-8.2) 





 


Albumin


 


 


 1.0 g/dL


(3.4-5.0) 





 


Albumin/Globulin Ratio   0.3 (1.0-1.7)  








Micro


6/7 





GRAM STAIN  Final  


        Final





        GRAM NEGATIVE RODS:MODERATE


        SQUAMOUS EPI CELL:NOT APPLICABLE


        PMN (WBCs):RARE


        YEAST:MODERATE


        Unless otherwise specified, Testing Performed by:


        [address] [address]


        For Inquires, the Physician may contact the Microbiology


        department at [phone]





  ANAEROBIC-AEROBIC CULTURE  Preliminary  


        Preliminary





        MANY GRAM NEGATIVE RODS on 06/09/20 at 3415


        FINAL ID= [PSEUDOMONAS AERUGINOSA]


        PSEUDOMONAS AERUGINOSA





  ANTIMICROBIAL SUSCEPTIBILITY  Preliminary  


        Comment





        NEG ANSON 56


        PSEUDOMONAS AERUGINOSA


        ANTIBIOTIC                        RESULT          INTERPRETATION


        AMIKACIN                          <=16                  S


        AZTREONAM                         >16                   R


        CEFTAZIDIME                       >16                   R


        CIPROFLOXACIN                     <=0.25                S


        CEFEPIME                          16                    I


        CEFTAZIDIME/AVIBACTAM             <=4                   S


        GENTAMICIN                        <=2                   S














                               ** CONTINUED ON NEXT PAGE **





--------

--------------------------------------------------------------------------------


----





RUN DATE: 06/11/20                  [facility] *LIVE*               

  PAGE 2   


RUN TIME: 1016                            Specimen Inquiry                    


 

--------------------------------------------------------------------------------


------------





SPEC: [id number]    PATIENT: JESENIA BEAN                [id number]  

(Continued)


-----------------------------------------------------------------------

---------------------








 

--------------------------------------------------------------------------------


------------





  Procedure                         Result                                      

         


-----------------------------------------------

---------------------------------------------





  ANTIMICROBIAL SUSCEPTIBILITY  Preliminary   (continued)


        LEVOFLOXACIN                      <=0.5                 S


        MEROPENEM                         <=1                   S


        PIPERACILLIN/TAZOBACTAM           64                    S


        TOBRAMYCIN                        <=2                   S


        Unless otherwise specified, Testing Performed by:


        [facility]


        [address] [place] [address]


        For Inquires, the Physician may contact the Microbiology


        department at [phone]











CT Scan 6/6





IMPRESSION:


1. Removal of the percutaneous pigtail drainage catheters since the prior 


exam. Sequela of pancreatitis with extensive pseudocysts again 


demonstrated, the right-sided collections are slightly larger since the 


prior exam, the left-sided collections are stable. See above.


2. Moderate to large left pleural effusion with atelectasis and collapse 


of most of the left lower lobe, stable. Small right pleural effusion is 


stable.


3. Gallstone.





Objective


Assessment


Patient with prolonged hospitalization


Multiple medical problems


Multiple surgical procedures





CXR with Left lung white-out 6/13 ? effusion


Acute hypoxic resp failure on 40 % and 5 PEEP


Fever improving


Acute pancreatitis with persistent  necrosis


CT a/p 4/9


    Increased ascites. Persistent evidence of necrotizing pancreatitis with 

fluid and phlegmon


   at the pancreas


   4/27 status post ROBERT drain placement; yeast


   5/6 fluid  candida parapsilosis fluid amylase high


CT 6/7


IMPRESSION:


1.   Sequela of pancreatitis with extensive pseudocysts again 


demonstrated, the right-sided collections are slightly larger since the 


prior exam, the left-sided collections are stable. 


6/7 fluid cult PSAE (MDRO),yeast moderate s/p drain times three





Cholelithiasis with thickening of the gallbladder wall.


Leucocytosis - better


JED,Hyperkalemia, Metabolic acidosis off dialysis


Acute hypoxic resp failure ,bilateral pleural effusion and atelectasis


hypocalcemia 


Prediabetes


HTN


s/p trach





S/p thoracenteis 5/12





Plan


Plan of Care








Await Pulm F/u - may need bronch - has increased secretions per nursing. ? 

possible effusion - currently AF/WBC nml 


Continue meropenem, has MDRP PSAE June 7


cont micafungin June 7


Follow-up cultures fluid  for yeast


Monitor a.m. labs


General surgery following


Monitor drain output


Maintain aspiration precaution


Supportive care


Prognosis poor





Critically ill








D/w nursing











RASHAWN ROSEN MD              Jun 14, 2020 06:15

## 2020-06-14 NOTE — PDOC
SAURAV NASH APRN 6/14/20 0914:


SURGICAL PROGRESS NOTE


Subjective


now on vent --high secretions 


right drain with significant output


Vital Signs





Vital Signs








  Date Time  Temp Pulse Resp B/P (MAP) Pulse Ox O2 Delivery O2 Flow Rate FiO2


 


6/14/20 09:00  69 20 119/60 (79) 99 Ventilator  


 


6/14/20 08:00 97.6       





 97.6       








I&O











Intake and Output 


 


 6/14/20





 07:00


 


Intake Total 3419 ml


 


Output Total 2674 ml


 


Balance 745 ml


 


 


 


IV Total 3419 ml


 


Output Urine Total 1169 ml


 


Drainage Total 1505 ml








General:  No acute distress


HEENT:  Other (trach in place)


Abdomen:  Other (drains in place)


Labs





Laboratory Tests








Test


 6/12/20


12:05 6/12/20


12:45 6/12/20


17:11 6/12/20


23:56


 


Glucose (Fingerstick)


 158 mg/dL


(70-99) 


 145 mg/dL


(70-99) 140 mg/dL


(70-99)


 


White Blood Count


 


 7.0 x10^3/uL


(4.0-11.0) 


 





 


Red Blood Count


 


 2.99 x10^6/uL


(3.50-5.40) 


 





 


Hemoglobin


 


 8.5 g/dL


(12.0-15.5) 


 





 


Hematocrit


 


 25.7 %


(36.0-47.0) 


 





 


Mean Corpuscular Volume  86 fL ()   


 


Mean Corpuscular Hemoglobin  29 pg (25-35)   


 


Mean Corpuscular Hemoglobin


Concent 


 33 g/dL


(31-37) 


 





 


Red Cell Distribution Width


 


 19.7 %


(11.5-14.5) 


 





 


Platelet Count


 


 347 x10^3/uL


(140-400) 


 





 


Test


 6/13/20


04:00 6/13/20


05:20 6/13/20


07:50 6/13/20


11:24


 


White Blood Count


 9.4 x10^3/uL


(4.0-11.0) 


 


 





 


Red Blood Count


 3.27 x10^6/uL


(3.50-5.40) 


 


 





 


Hemoglobin


 9.2 g/dL


(12.0-15.5) 


 


 





 


Hematocrit


 27.7 %


(36.0-47.0) 


 


 





 


Mean Corpuscular Volume 85 fL ()    


 


Mean Corpuscular Hemoglobin 28 pg (25-35)    


 


Mean Corpuscular Hemoglobin


Concent 33 g/dL


(31-37) 


 


 





 


Red Cell Distribution Width


 20.1 %


(11.5-14.5) 


 


 





 


Platelet Count


 433 x10^3/uL


(140-400) 


 


 





 


Neutrophils (%) (Auto) 76 % (31-73)    


 


Lymphocytes (%) (Auto) 17 % (24-48)    


 


Monocytes (%) (Auto) 5 % (0-9)    


 


Eosinophils (%) (Auto) 2 % (0-3)    


 


Basophils (%) (Auto) 0 % (0-3)    


 


Neutrophils # (Auto)


 7.2 x10^3/uL


(1.8-7.7) 


 


 





 


Lymphocytes # (Auto)


 1.6 x10^3/uL


(1.0-4.8) 


 


 





 


Monocytes # (Auto)


 0.5 x10^3/uL


(0.0-1.1) 


 


 





 


Eosinophils # (Auto)


 0.2 x10^3/uL


(0.0-0.7) 


 


 





 


Basophils # (Auto)


 0.0 x10^3/uL


(0.0-0.2) 


 


 





 


Sodium Level


 146 mmol/L


(136-145) 


 


 





 


Potassium Level


 3.8 mmol/L


(3.5-5.1) 


 


 





 


Chloride Level


 112 mmol/L


() 


 


 





 


Carbon Dioxide Level


 24 mmol/L


(21-32) 


 


 





 


Anion Gap 10 (6-14)    


 


Blood Urea Nitrogen


 25 mg/dL


(7-20) 


 


 





 


Creatinine


 0.7 mg/dL


(0.6-1.0) 


 


 





 


Estimated GFR


(Cockcroft-Gault) 88.9 


 


 


 





 


Glucose Level


 151 mg/dL


(70-99) 


 


 





 


Calcium Level


 10.1 mg/dL


(8.5-10.1) 


 


 





 


O2 Saturation  89 % (92-99)  99 % (92-99)  


 


Arterial Blood pH


 


 7.29


(7.35-7.45) 7.39


(7.35-7.45) 





 


Arterial Blood pCO2 at


Patient Temp 


 50 mmHg


(35-46) 38 mmHg


(35-46) 





 


Arterial Blood pO2 at Patient


Temp 


 64 mmHg


() 177 mmHg


() 





 


Arterial Blood HCO3


 


 24 mmol/L


(21-28) 23 mmol/L


(21-28) 





 


Arterial Blood Base Excess


 


 -3 mmol/L


(-3-3) -2 mmol/L


(-3-3) 





 


FiO2   75  


 


Glucose (Fingerstick)


 


 


 


 187 mg/dL


(70-99)


 


Test


 6/13/20


15:08 6/13/20


18:19 6/13/20


20:10 6/14/20


00:38


 


White Blood Count


 9.3 x10^3/uL


(4.0-11.0) 


 8.4 x10^3/uL


(4.0-11.0) 





 


Red Blood Count


 2.78 x10^6/uL


(3.50-5.40) 


 3.04 x10^6/uL


(3.50-5.40) 





 


Hemoglobin


 8.1 g/dL


(12.0-15.5) 


 9.0 g/dL


(12.0-15.5) 





 


Hematocrit


 23.5 %


(36.0-47.0) 


 26.0 %


(36.0-47.0) 





 


Mean Corpuscular Volume 85 fL ()   86 fL ()  


 


Mean Corpuscular Hemoglobin 29 pg (25-35)   30 pg (25-35)  


 


Mean Corpuscular Hemoglobin


Concent 34 g/dL


(31-37) 


 35 g/dL


(31-37) 





 


Red Cell Distribution Width


 19.1 %


(11.5-14.5) 


 18.7 %


(11.5-14.5) 





 


Platelet Count


 380 x10^3/uL


(140-400) 


 388 x10^3/uL


(140-400) 





 


Magnesium Level


 1.5 mg/dL


(1.8-2.4) 


 


 





 


Glucose (Fingerstick)


 


 220 mg/dL


(70-99) 


 188 mg/dL


(70-99)


 


Test


 6/14/20


05:30 


 


 





 


White Blood Count


 7.5 x10^3/uL


(4.0-11.0) 


 


 





 


Red Blood Count


 2.95 x10^6/uL


(3.50-5.40) 


 


 





 


Hemoglobin


 8.6 g/dL


(12.0-15.5) 


 


 





 


Hematocrit


 25.0 %


(36.0-47.0) 


 


 





 


Mean Corpuscular Volume 85 fL ()    


 


Mean Corpuscular Hemoglobin 29 pg (25-35)    


 


Mean Corpuscular Hemoglobin


Concent 34 g/dL


(31-37) 


 


 





 


Red Cell Distribution Width


 18.0 %


(11.5-14.5) 


 


 





 


Platelet Count


 382 x10^3/uL


(140-400) 


 


 





 


Neutrophils (%) (Auto) 79 % (31-73)    


 


Lymphocytes (%) (Auto) 17 % (24-48)    


 


Monocytes (%) (Auto) 4 % (0-9)    


 


Eosinophils (%) (Auto) 0 % (0-3)    


 


Basophils (%) (Auto) 0 % (0-3)    


 


Neutrophils # (Auto)


 5.9 x10^3/uL


(1.8-7.7) 


 


 





 


Lymphocytes # (Auto)


 1.3 x10^3/uL


(1.0-4.8) 


 


 





 


Monocytes # (Auto)


 0.3 x10^3/uL


(0.0-1.1) 


 


 





 


Eosinophils # (Auto)


 0.0 x10^3/uL


(0.0-0.7) 


 


 





 


Basophils # (Auto)


 0.0 x10^3/uL


(0.0-0.2) 


 


 





 


Sodium Level


 145 mmol/L


(136-145) 


 


 





 


Potassium Level


 3.8 mmol/L


(3.5-5.1) 


 


 





 


Chloride Level


 113 mmol/L


() 


 


 





 


Carbon Dioxide Level


 24 mmol/L


(21-32) 


 


 





 


Anion Gap 8 (6-14)    


 


Blood Urea Nitrogen


 36 mg/dL


(7-20) 


 


 





 


Creatinine


 0.8 mg/dL


(0.6-1.0) 


 


 





 


Estimated GFR


(Cockcroft-Gault) 76.2 


 


 


 





 


BUN/Creatinine Ratio 45 (6-20)    


 


Glucose Level


 227 mg/dL


(70-99) 


 


 





 


Calcium Level


 9.2 mg/dL


(8.5-10.1) 


 


 





 


Phosphorus Level


 3.7 mg/dL


(2.6-4.7) 


 


 





 


Magnesium Level


 2.5 mg/dL


(1.8-2.4) 


 


 





 


Total Bilirubin


 0.4 mg/dL


(0.2-1.0) 


 


 





 


Aspartate Amino Transf


(AST/SGOT) 31 U/L (15-37) 


 


 


 





 


Alanine Aminotransferase


(ALT/SGPT) 29 U/L (14-59) 


 


 


 





 


Alkaline Phosphatase


 82 U/L


() 


 


 





 


Total Protein


 4.6 g/dL


(6.4-8.2) 


 


 





 


Albumin


 1.0 g/dL


(3.4-5.0) 


 


 





 


Albumin/Globulin Ratio 0.3 (1.0-1.7)    








Laboratory Tests








Test


 6/13/20


11:24 6/13/20


15:08 6/13/20


18:19 6/13/20


20:10


 


Glucose (Fingerstick)


 187 mg/dL


(70-99) 


 220 mg/dL


(70-99) 





 


White Blood Count


 


 9.3 x10^3/uL


(4.0-11.0) 


 8.4 x10^3/uL


(4.0-11.0)


 


Red Blood Count


 


 2.78 x10^6/uL


(3.50-5.40) 


 3.04 x10^6/uL


(3.50-5.40)


 


Hemoglobin


 


 8.1 g/dL


(12.0-15.5) 


 9.0 g/dL


(12.0-15.5)


 


Hematocrit


 


 23.5 %


(36.0-47.0) 


 26.0 %


(36.0-47.0)


 


Mean Corpuscular Volume  85 fL ()   86 fL () 


 


Mean Corpuscular Hemoglobin  29 pg (25-35)   30 pg (25-35) 


 


Mean Corpuscular Hemoglobin


Concent 


 34 g/dL


(31-37) 


 35 g/dL


(31-37)


 


Red Cell Distribution Width


 


 19.1 %


(11.5-14.5) 


 18.7 %


(11.5-14.5)


 


Platelet Count


 


 380 x10^3/uL


(140-400) 


 388 x10^3/uL


(140-400)


 


Magnesium Level


 


 1.5 mg/dL


(1.8-2.4) 


 





 


Test


 6/14/20


00:38 6/14/20


05:30 


 





 


Glucose (Fingerstick)


 188 mg/dL


(70-99) 


 


 





 


White Blood Count


 


 7.5 x10^3/uL


(4.0-11.0) 


 





 


Red Blood Count


 


 2.95 x10^6/uL


(3.50-5.40) 


 





 


Hemoglobin


 


 8.6 g/dL


(12.0-15.5) 


 





 


Hematocrit


 


 25.0 %


(36.0-47.0) 


 





 


Mean Corpuscular Volume  85 fL ()   


 


Mean Corpuscular Hemoglobin  29 pg (25-35)   


 


Mean Corpuscular Hemoglobin


Concent 


 34 g/dL


(31-37) 


 





 


Red Cell Distribution Width


 


 18.0 %


(11.5-14.5) 


 





 


Platelet Count


 


 382 x10^3/uL


(140-400) 


 





 


Neutrophils (%) (Auto)  79 % (31-73)   


 


Lymphocytes (%) (Auto)  17 % (24-48)   


 


Monocytes (%) (Auto)  4 % (0-9)   


 


Eosinophils (%) (Auto)  0 % (0-3)   


 


Basophils (%) (Auto)  0 % (0-3)   


 


Neutrophils # (Auto)


 


 5.9 x10^3/uL


(1.8-7.7) 


 





 


Lymphocytes # (Auto)


 


 1.3 x10^3/uL


(1.0-4.8) 


 





 


Monocytes # (Auto)


 


 0.3 x10^3/uL


(0.0-1.1) 


 





 


Eosinophils # (Auto)


 


 0.0 x10^3/uL


(0.0-0.7) 


 





 


Basophils # (Auto)


 


 0.0 x10^3/uL


(0.0-0.2) 


 





 


Sodium Level


 


 145 mmol/L


(136-145) 


 





 


Potassium Level


 


 3.8 mmol/L


(3.5-5.1) 


 





 


Chloride Level


 


 113 mmol/L


() 


 





 


Carbon Dioxide Level


 


 24 mmol/L


(21-32) 


 





 


Anion Gap  8 (6-14)   


 


Blood Urea Nitrogen


 


 36 mg/dL


(7-20) 


 





 


Creatinine


 


 0.8 mg/dL


(0.6-1.0) 


 





 


Estimated GFR


(Cockcroft-Gault) 


 76.2 


 


 





 


BUN/Creatinine Ratio  45 (6-20)   


 


Glucose Level


 


 227 mg/dL


(70-99) 


 





 


Calcium Level


 


 9.2 mg/dL


(8.5-10.1) 


 





 


Phosphorus Level


 


 3.7 mg/dL


(2.6-4.7) 


 





 


Magnesium Level


 


 2.5 mg/dL


(1.8-2.4) 


 





 


Total Bilirubin


 


 0.4 mg/dL


(0.2-1.0) 


 





 


Aspartate Amino Transf


(AST/SGOT) 


 31 U/L (15-37) 


 


 





 


Alanine Aminotransferase


(ALT/SGPT) 


 29 U/L (14-59) 


 


 





 


Alkaline Phosphatase


 


 82 U/L


() 


 





 


Total Protein


 


 4.6 g/dL


(6.4-8.2) 


 





 


Albumin


 


 1.0 g/dL


(3.4-5.0) 


 





 


Albumin/Globulin Ratio  0.3 (1.0-1.7)   








Problem List


Problems


Medical Problems:


(1) Acute pancreatitis


Status: Acute  





(2) Cholelithiasis


Status: Acute  








Assessment/Plan


supportive care


Dr Flores returns tomorrow





Justicifation of Admission Dx:


Justifications for Admission:


Justification of Admission Dx:  Yes





OVIDIO MEDINA MD 6/14/20 0948:


SURGICAL PROGRESS NOTE


Assessment/Plan


Agree with Uvaldo assessment and plan. Supportive care.











SAURAV NSAH            Jun 14, 2020 09:14


OVIDIO MEDINA MD             Jun 14, 2020 09:48

## 2020-06-14 NOTE — NUR
Pharmacy TPN Dosing Note



S: JESENIA BEAN is a 49 year old F Currently receiving Central Continuous TPN started 
03/18/20



B:Pertinent PMH: 

Necrotizing pancreatitis

Height: 5 feet, 8 inches

Weight: 78.5 kg



Current diet: NPO 



LABS:

Sodium:    145 

Potassium: 3.8 

Chloride:  113 

Calcium:   9.2 

Corrected Calcium: 11.60 

Magnesium: 2.5 

CO2:       24 

SCr:       0.8 

Glucose:   227 

Albumin:   1.0 

AST:       31 

ALT:       29 



TPN FORMULA:

TPN TYPE:  Central Continuous

AMINO ACIDS:         75 gm

DEXTROSE:            250 gm

LIPIDS:              20 gm

SODIUM CHLORIDE:     - mEq

SODIUM ACETATE:      - mEq

SODIUM PHOSPHATE:    - mmol

POTASSIUM CHLORIDE:  - mEq

POTASSIUM ACETATE:   40 mEq

POTASSIUM PHOSPHATE: - mmol

MAGNESIUM:           5 mEq

CALCIUM:             - mEq

INSULIN:             30 units

MULTIPLE VITAMIN:    10 ml

TRACE ELEMENTS:      1 ml(s)



TPN PLAN:  

Continue same TPN.





R: Continue TPN 

Will monitor electrolytes, glucose, and tolerance to TPN.



 CHRISTIANNE MELO [facility], 06/14/20 1043

## 2020-06-15 VITALS — DIASTOLIC BLOOD PRESSURE: 93 MMHG | SYSTOLIC BLOOD PRESSURE: 161 MMHG

## 2020-06-15 VITALS — DIASTOLIC BLOOD PRESSURE: 76 MMHG | SYSTOLIC BLOOD PRESSURE: 142 MMHG

## 2020-06-15 VITALS — SYSTOLIC BLOOD PRESSURE: 134 MMHG | DIASTOLIC BLOOD PRESSURE: 76 MMHG

## 2020-06-15 VITALS — DIASTOLIC BLOOD PRESSURE: 69 MMHG | SYSTOLIC BLOOD PRESSURE: 145 MMHG

## 2020-06-15 VITALS — SYSTOLIC BLOOD PRESSURE: 150 MMHG | DIASTOLIC BLOOD PRESSURE: 86 MMHG

## 2020-06-15 VITALS — SYSTOLIC BLOOD PRESSURE: 128 MMHG | DIASTOLIC BLOOD PRESSURE: 72 MMHG

## 2020-06-15 VITALS — SYSTOLIC BLOOD PRESSURE: 118 MMHG | DIASTOLIC BLOOD PRESSURE: 92 MMHG

## 2020-06-15 VITALS — SYSTOLIC BLOOD PRESSURE: 159 MMHG | DIASTOLIC BLOOD PRESSURE: 79 MMHG

## 2020-06-15 VITALS — SYSTOLIC BLOOD PRESSURE: 159 MMHG | DIASTOLIC BLOOD PRESSURE: 91 MMHG

## 2020-06-15 VITALS — DIASTOLIC BLOOD PRESSURE: 93 MMHG | SYSTOLIC BLOOD PRESSURE: 147 MMHG

## 2020-06-15 VITALS — DIASTOLIC BLOOD PRESSURE: 71 MMHG | SYSTOLIC BLOOD PRESSURE: 134 MMHG

## 2020-06-15 VITALS — SYSTOLIC BLOOD PRESSURE: 160 MMHG | DIASTOLIC BLOOD PRESSURE: 85 MMHG

## 2020-06-15 VITALS — DIASTOLIC BLOOD PRESSURE: 76 MMHG | SYSTOLIC BLOOD PRESSURE: 150 MMHG

## 2020-06-15 VITALS — SYSTOLIC BLOOD PRESSURE: 154 MMHG | DIASTOLIC BLOOD PRESSURE: 80 MMHG

## 2020-06-15 VITALS — DIASTOLIC BLOOD PRESSURE: 75 MMHG | SYSTOLIC BLOOD PRESSURE: 140 MMHG

## 2020-06-15 VITALS — DIASTOLIC BLOOD PRESSURE: 65 MMHG | SYSTOLIC BLOOD PRESSURE: 134 MMHG

## 2020-06-15 VITALS — DIASTOLIC BLOOD PRESSURE: 72 MMHG | SYSTOLIC BLOOD PRESSURE: 138 MMHG

## 2020-06-15 VITALS — DIASTOLIC BLOOD PRESSURE: 80 MMHG | SYSTOLIC BLOOD PRESSURE: 148 MMHG

## 2020-06-15 VITALS — DIASTOLIC BLOOD PRESSURE: 72 MMHG | SYSTOLIC BLOOD PRESSURE: 154 MMHG

## 2020-06-15 VITALS — SYSTOLIC BLOOD PRESSURE: 140 MMHG | DIASTOLIC BLOOD PRESSURE: 74 MMHG

## 2020-06-15 VITALS — DIASTOLIC BLOOD PRESSURE: 75 MMHG | SYSTOLIC BLOOD PRESSURE: 165 MMHG

## 2020-06-15 VITALS — SYSTOLIC BLOOD PRESSURE: 126 MMHG | DIASTOLIC BLOOD PRESSURE: 70 MMHG

## 2020-06-15 VITALS — SYSTOLIC BLOOD PRESSURE: 146 MMHG | DIASTOLIC BLOOD PRESSURE: 76 MMHG

## 2020-06-15 VITALS — DIASTOLIC BLOOD PRESSURE: 77 MMHG | SYSTOLIC BLOOD PRESSURE: 144 MMHG

## 2020-06-15 VITALS — SYSTOLIC BLOOD PRESSURE: 142 MMHG | DIASTOLIC BLOOD PRESSURE: 68 MMHG

## 2020-06-15 VITALS — DIASTOLIC BLOOD PRESSURE: 67 MMHG | SYSTOLIC BLOOD PRESSURE: 124 MMHG

## 2020-06-15 VITALS — SYSTOLIC BLOOD PRESSURE: 143 MMHG | DIASTOLIC BLOOD PRESSURE: 72 MMHG

## 2020-06-15 VITALS — DIASTOLIC BLOOD PRESSURE: 83 MMHG | SYSTOLIC BLOOD PRESSURE: 142 MMHG

## 2020-06-15 VITALS — SYSTOLIC BLOOD PRESSURE: 143 MMHG | DIASTOLIC BLOOD PRESSURE: 78 MMHG

## 2020-06-15 VITALS — SYSTOLIC BLOOD PRESSURE: 144 MMHG | DIASTOLIC BLOOD PRESSURE: 71 MMHG

## 2020-06-15 LAB
ALBUMIN SERPL-MCNC: 1.1 G/DL (ref 3.4–5)
ALBUMIN/GLOB SERPL: 0.3 {RATIO} (ref 1–1.7)
ALP SERPL-CCNC: 132 U/L (ref 46–116)
ALT SERPL-CCNC: 72 U/L (ref 14–59)
ANION GAP SERPL CALC-SCNC: 9 MMOL/L (ref 6–14)
AST SERPL-CCNC: 62 U/L (ref 15–37)
BASE EXCESS ABG: -2 MMOL/L (ref -3–3)
BASOPHILS # BLD AUTO: 0 X10^3/UL (ref 0–0.2)
BASOPHILS NFR BLD: 0 % (ref 0–3)
BILIRUB SERPL-MCNC: 0.5 MG/DL (ref 0.2–1)
BUN SERPL-MCNC: 34 MG/DL (ref 7–20)
BUN/CREAT SERPL: 49 (ref 6–20)
CALCIUM SERPL-MCNC: 8.8 MG/DL (ref 8.5–10.1)
CHLORIDE SERPL-SCNC: 111 MMOL/L (ref 98–107)
CO2 SERPL-SCNC: 23 MMOL/L (ref 21–32)
CREAT SERPL-MCNC: 0.7 MG/DL (ref 0.6–1)
EOSINOPHIL NFR BLD: 0.1 X10^3/UL (ref 0–0.7)
EOSINOPHIL NFR BLD: 1 % (ref 0–3)
ERYTHROCYTE [DISTWIDTH] IN BLOOD BY AUTOMATED COUNT: 18.6 % (ref 11.5–14.5)
GFR SERPLBLD BASED ON 1.73 SQ M-ARVRAT: 88.9 ML/MIN
GLOBULIN SER-MCNC: 3.4 G/DL (ref 2.2–3.8)
GLUCOSE SERPL-MCNC: 125 MG/DL (ref 70–99)
HCO3 BLDA-SCNC: 22 MMOL/L (ref 21–28)
HCT VFR BLD CALC: 27.8 % (ref 36–47)
HGB BLD-MCNC: 9.3 G/DL (ref 12–15.5)
INSPIRATION SETTING TIME VENT: 35
LYMPHOCYTES # BLD: 1.2 X10^3/UL (ref 1–4.8)
LYMPHOCYTES NFR BLD AUTO: 15 % (ref 24–48)
MAGNESIUM SERPL-MCNC: 1.8 MG/DL (ref 1.8–2.4)
MCH RBC QN AUTO: 28 PG (ref 25–35)
MCHC RBC AUTO-ENTMCNC: 34 G/DL (ref 31–37)
MCV RBC AUTO: 85 FL (ref 79–100)
MONO #: 0.4 X10^3/UL (ref 0–1.1)
MONOCYTES NFR BLD: 5 % (ref 0–9)
NEUT #: 6.4 X10^3/UL (ref 1.8–7.7)
NEUTROPHILS NFR BLD AUTO: 80 % (ref 31–73)
PCO2 BLDA: 31 MMHG (ref 35–46)
PLATELET # BLD AUTO: 434 X10^3/UL (ref 140–400)
PO2 BLDA: 117 MMHG (ref 75–108)
POTASSIUM SERPL-SCNC: 3.3 MMOL/L (ref 3.5–5.1)
PROT SERPL-MCNC: 4.5 G/DL (ref 6.4–8.2)
RBC # BLD AUTO: 3.28 X10^6/UL (ref 3.5–5.4)
SAO2 % BLDA: 97 % (ref 92–99)
SODIUM SERPL-SCNC: 143 MMOL/L (ref 136–145)
WBC # BLD AUTO: 8.1 X10^3/UL (ref 4–11)

## 2020-06-15 PROCEDURE — 0W9B30Z DRAINAGE OF LEFT PLEURAL CAVITY WITH DRAINAGE DEVICE, PERCUTANEOUS APPROACH: ICD-10-PCS | Performed by: RADIOLOGY

## 2020-06-15 RX ADMIN — INSULIN LISPRO SCH UNITS: 100 INJECTION, SOLUTION INTRAVENOUS; SUBCUTANEOUS at 06:00

## 2020-06-15 RX ADMIN — IPRATROPIUM BROMIDE AND ALBUTEROL SULFATE SCH ML: .5; 3 SOLUTION RESPIRATORY (INHALATION) at 20:24

## 2020-06-15 RX ADMIN — ONDANSETRON PRN MG: 2 INJECTION INTRAMUSCULAR; INTRAVENOUS at 13:50

## 2020-06-15 RX ADMIN — DILTIAZEM HYDROCHLORIDE SCH MLS/HR: 5 INJECTION INTRAVENOUS at 08:31

## 2020-06-15 RX ADMIN — IPRATROPIUM BROMIDE AND ALBUTEROL SULFATE SCH ML: .5; 3 SOLUTION RESPIRATORY (INHALATION) at 16:24

## 2020-06-15 RX ADMIN — Medication PRN EACH: at 09:51

## 2020-06-15 RX ADMIN — PROPOFOL PRN MLS/HR: 10 INJECTION, EMULSION INTRAVENOUS at 13:10

## 2020-06-15 RX ADMIN — BACITRACIN SCH MLS/HR: 5000 INJECTION, POWDER, FOR SOLUTION INTRAMUSCULAR at 03:38

## 2020-06-15 RX ADMIN — ONDANSETRON PRN MG: 2 INJECTION INTRAMUSCULAR; INTRAVENOUS at 20:12

## 2020-06-15 RX ADMIN — DILTIAZEM HYDROCHLORIDE SCH MLS/HR: 5 INJECTION INTRAVENOUS at 20:51

## 2020-06-15 RX ADMIN — ACETYLCYSTEINE SCH MG: 200 INHALANT RESPIRATORY (INHALATION) at 07:53

## 2020-06-15 RX ADMIN — BACITRACIN SCH MLS/HR: 5000 INJECTION, POWDER, FOR SOLUTION INTRAMUSCULAR at 19:57

## 2020-06-15 RX ADMIN — DEXTRAN 70, GLYCERIN, HYPROMELLOSE PRN DROP: 1; 2; 3 SOLUTION/ DROPS OPHTHALMIC at 03:38

## 2020-06-15 RX ADMIN — IPRATROPIUM BROMIDE AND ALBUTEROL SULFATE SCH ML: .5; 3 SOLUTION RESPIRATORY (INHALATION) at 04:00

## 2020-06-15 RX ADMIN — IPRATROPIUM BROMIDE AND ALBUTEROL SULFATE SCH ML: .5; 3 SOLUTION RESPIRATORY (INHALATION) at 12:00

## 2020-06-15 RX ADMIN — DILTIAZEM HYDROCHLORIDE SCH MLS/HR: 5 INJECTION INTRAVENOUS at 10:36

## 2020-06-15 RX ADMIN — POTASSIUM CHLORIDE SCH MLS/HR: 200 INJECTION, SOLUTION INTRAVENOUS at 08:31

## 2020-06-15 RX ADMIN — IPRATROPIUM BROMIDE AND ALBUTEROL SULFATE SCH ML: .5; 3 SOLUTION RESPIRATORY (INHALATION) at 00:00

## 2020-06-15 RX ADMIN — ACETYLCYSTEINE SCH MG: 200 INHALANT RESPIRATORY (INHALATION) at 20:24

## 2020-06-15 RX ADMIN — INSULIN LISPRO SCH UNITS: 100 INJECTION, SOLUTION INTRAVENOUS; SUBCUTANEOUS at 00:00

## 2020-06-15 RX ADMIN — INSULIN LISPRO SCH UNITS: 100 INJECTION, SOLUTION INTRAVENOUS; SUBCUTANEOUS at 12:00

## 2020-06-15 RX ADMIN — IPRATROPIUM BROMIDE AND ALBUTEROL SULFATE SCH ML: .5; 3 SOLUTION RESPIRATORY (INHALATION) at 07:53

## 2020-06-15 RX ADMIN — POTASSIUM CHLORIDE SCH MLS/HR: 200 INJECTION, SOLUTION INTRAVENOUS at 07:05

## 2020-06-15 RX ADMIN — BACITRACIN SCH MLS/HR: 5000 INJECTION, POWDER, FOR SOLUTION INTRAMUSCULAR at 08:00

## 2020-06-15 RX ADMIN — IPRATROPIUM BROMIDE AND ALBUTEROL SULFATE SCH ML: .5; 3 SOLUTION RESPIRATORY (INHALATION) at 23:45

## 2020-06-15 RX ADMIN — INSULIN LISPRO SCH UNITS: 100 INJECTION, SOLUTION INTRAVENOUS; SUBCUTANEOUS at 17:30

## 2020-06-15 RX ADMIN — PANTOPRAZOLE SODIUM SCH MG: 40 INJECTION, POWDER, FOR SOLUTION INTRAVENOUS at 08:31

## 2020-06-15 NOTE — PDOC
SAURAV NASH APRN 6/15/20 1129:


SURGICAL PROGRESS NOTE


Subjective


awake


d/w ID


plans for thoracentesis today


drains with decreasing output


old drain site with drainage currently on left abdomen


Vital Signs





Vital Signs








  Date Time  Temp Pulse Resp B/P (MAP) Pulse Ox O2 Delivery O2 Flow Rate FiO2


 


6/15/20 11:00  105 22 124/67 (86) 100 Tracheal Collar  


 


6/15/20 08:00 98.5       





 98.5       








I&O











Intake and Output 


 


 6/15/20





 07:00


 


Intake Total 3353.41 ml


 


Output Total 1861 ml


 


Balance 1492.41 ml


 


 


 


IV Total 3353.41 ml


 


Output Urine Total 1518 ml


 


Drainage Total 343 ml








General:  Cooperative


HEENT:  Other (trach)


Abdomen:  Soft, Other (drains in place)


Labs





Laboratory Tests








Test


 6/13/20


15:08 6/13/20


18:19 6/13/20


20:10 6/14/20


00:38


 


White Blood Count


 9.3 x10^3/uL


(4.0-11.0) 


 8.4 x10^3/uL


(4.0-11.0) 





 


Red Blood Count


 2.78 x10^6/uL


(3.50-5.40) 


 3.04 x10^6/uL


(3.50-5.40) 





 


Hemoglobin


 8.1 g/dL


(12.0-15.5) 


 9.0 g/dL


(12.0-15.5) 





 


Hematocrit


 23.5 %


(36.0-47.0) 


 26.0 %


(36.0-47.0) 





 


Mean Corpuscular Volume 85 fL ()   86 fL ()  


 


Mean Corpuscular Hemoglobin 29 pg (25-35)   30 pg (25-35)  


 


Mean Corpuscular Hemoglobin


Concent 34 g/dL


(31-37) 


 35 g/dL


(31-37) 





 


Red Cell Distribution Width


 19.1 %


(11.5-14.5) 


 18.7 %


(11.5-14.5) 





 


Platelet Count


 380 x10^3/uL


(140-400) 


 388 x10^3/uL


(140-400) 





 


Magnesium Level


 1.5 mg/dL


(1.8-2.4) 


 


 





 


Glucose (Fingerstick)


 


 220 mg/dL


(70-99) 


 188 mg/dL


(70-99)


 


Test


 6/14/20


05:30 6/14/20


10:45 6/14/20


12:11 6/14/20


17:52


 


White Blood Count


 7.5 x10^3/uL


(4.0-11.0) 


 


 





 


Red Blood Count


 2.95 x10^6/uL


(3.50-5.40) 


 


 





 


Hemoglobin


 8.6 g/dL


(12.0-15.5) 


 


 





 


Hematocrit


 25.0 %


(36.0-47.0) 


 


 





 


Mean Corpuscular Volume 85 fL ()    


 


Mean Corpuscular Hemoglobin 29 pg (25-35)    


 


Mean Corpuscular Hemoglobin


Concent 34 g/dL


(31-37) 


 


 





 


Red Cell Distribution Width


 18.0 %


(11.5-14.5) 


 


 





 


Platelet Count


 382 x10^3/uL


(140-400) 


 


 





 


Neutrophils (%) (Auto) 79 % (31-73)    


 


Lymphocytes (%) (Auto) 17 % (24-48)    


 


Monocytes (%) (Auto) 4 % (0-9)    


 


Eosinophils (%) (Auto) 0 % (0-3)    


 


Basophils (%) (Auto) 0 % (0-3)    


 


Neutrophils # (Auto)


 5.9 x10^3/uL


(1.8-7.7) 


 


 





 


Lymphocytes # (Auto)


 1.3 x10^3/uL


(1.0-4.8) 


 


 





 


Monocytes # (Auto)


 0.3 x10^3/uL


(0.0-1.1) 


 


 





 


Eosinophils # (Auto)


 0.0 x10^3/uL


(0.0-0.7) 


 


 





 


Basophils # (Auto)


 0.0 x10^3/uL


(0.0-0.2) 


 


 





 


Sodium Level


 145 mmol/L


(136-145) 


 


 





 


Potassium Level


 3.8 mmol/L


(3.5-5.1) 


 


 





 


Chloride Level


 113 mmol/L


() 


 


 





 


Carbon Dioxide Level


 24 mmol/L


(21-32) 


 


 





 


Anion Gap 8 (6-14)    


 


Blood Urea Nitrogen


 36 mg/dL


(7-20) 


 


 





 


Creatinine


 0.8 mg/dL


(0.6-1.0) 


 


 





 


Estimated GFR


(Cockcroft-Gault) 76.2 


 


 


 





 


BUN/Creatinine Ratio 45 (6-20)    


 


Glucose Level


 227 mg/dL


(70-99) 


 


 





 


Calcium Level


 9.2 mg/dL


(8.5-10.1) 


 


 





 


Phosphorus Level


 3.7 mg/dL


(2.6-4.7) 


 


 





 


Magnesium Level


 2.5 mg/dL


(1.8-2.4) 


 


 





 


Total Bilirubin


 0.4 mg/dL


(0.2-1.0) 


 


 





 


Aspartate Amino Transf


(AST/SGOT) 31 U/L (15-37) 


 


 


 





 


Alanine Aminotransferase


(ALT/SGPT) 29 U/L (14-59) 


 


 


 





 


Alkaline Phosphatase


 82 U/L


() 


 


 





 


Total Protein


 4.6 g/dL


(6.4-8.2) 


 


 





 


Albumin


 1.0 g/dL


(3.4-5.0) 


 


 





 


Albumin/Globulin Ratio 0.3 (1.0-1.7)    


 


O2 Saturation  98 % (92-99)   


 


Arterial Blood pH


 


 7.45


(7.35-7.45) 


 





 


Arterial Blood pCO2 at


Patient Temp 


 34 mmHg


(35-46) 


 





 


Arterial Blood pO2 at Patient


Temp 


 146 mmHg


() 


 





 


Arterial Blood HCO3


 


 23 mmol/L


(21-28) 


 





 


Arterial Blood Base Excess


 


 -1 mmol/L


(-3-3) 


 





 


FiO2  40   


 


Glucose (Fingerstick)


 


 


 159 mg/dL


(70-99) 129 mg/dL


(70-99)


 


Test


 6/15/20


00:23 6/15/20


05:45 6/15/20


05:54 6/15/20


08:10


 


Glucose (Fingerstick)


 118 mg/dL


(70-99) 


 115 mg/dL


(70-99) 





 


White Blood Count


 


 8.1 x10^3/uL


(4.0-11.0) 


 





 


Red Blood Count


 


 3.28 x10^6/uL


(3.50-5.40) 


 





 


Hemoglobin


 


 9.3 g/dL


(12.0-15.5) 


 





 


Hematocrit


 


 27.8 %


(36.0-47.0) 


 





 


Mean Corpuscular Volume  85 fL ()   


 


Mean Corpuscular Hemoglobin  28 pg (25-35)   


 


Mean Corpuscular Hemoglobin


Concent 


 34 g/dL


(31-37) 


 





 


Red Cell Distribution Width


 


 18.6 %


(11.5-14.5) 


 





 


Platelet Count


 


 434 x10^3/uL


(140-400) 


 





 


Neutrophils (%) (Auto)  80 % (31-73)   


 


Lymphocytes (%) (Auto)  15 % (24-48)   


 


Monocytes (%) (Auto)  5 % (0-9)   


 


Eosinophils (%) (Auto)  1 % (0-3)   


 


Basophils (%) (Auto)  0 % (0-3)   


 


Neutrophils # (Auto)


 


 6.4 x10^3/uL


(1.8-7.7) 


 





 


Lymphocytes # (Auto)


 


 1.2 x10^3/uL


(1.0-4.8) 


 





 


Monocytes # (Auto)


 


 0.4 x10^3/uL


(0.0-1.1) 


 





 


Eosinophils # (Auto)


 


 0.1 x10^3/uL


(0.0-0.7) 


 





 


Basophils # (Auto)


 


 0.0 x10^3/uL


(0.0-0.2) 


 





 


Sodium Level


 


 143 mmol/L


(136-145) 


 





 


Potassium Level


 


 3.3 mmol/L


(3.5-5.1) 


 





 


Chloride Level


 


 111 mmol/L


() 


 





 


Carbon Dioxide Level


 


 23 mmol/L


(21-32) 


 





 


Anion Gap  9 (6-14)   


 


Blood Urea Nitrogen


 


 34 mg/dL


(7-20) 


 





 


Creatinine


 


 0.7 mg/dL


(0.6-1.0) 


 





 


Estimated GFR


(Cockcroft-Gault) 


 88.9 


 


 





 


BUN/Creatinine Ratio  49 (6-20)   


 


Glucose Level


 


 125 mg/dL


(70-99) 


 





 


Calcium Level


 


 8.8 mg/dL


(8.5-10.1) 


 





 


Magnesium Level


 


 1.8 mg/dL


(1.8-2.4) 


 





 


Total Bilirubin


 


 0.5 mg/dL


(0.2-1.0) 


 





 


Aspartate Amino Transf


(AST/SGOT) 


 62 U/L (15-37) 


 


 





 


Alanine Aminotransferase


(ALT/SGPT) 


 72 U/L (14-59) 


 


 





 


Alkaline Phosphatase


 


 132 U/L


() 


 





 


Total Protein


 


 4.5 g/dL


(6.4-8.2) 


 





 


Albumin


 


 1.1 g/dL


(3.4-5.0) 


 





 


Albumin/Globulin Ratio  0.3 (1.0-1.7)   


 


O2 Saturation    97 % (92-99) 


 


Arterial Blood pH


 


 


 


 7.46


(7.35-7.45)


 


Arterial Blood pCO2 at


Patient Temp 


 


 


 31 mmHg


(35-46)


 


Arterial Blood pO2 at Patient


Temp 


 


 


 117 mmHg


()


 


Arterial Blood HCO3


 


 


 


 22 mmol/L


(21-28)


 


Arterial Blood Base Excess


 


 


 


 -2 mmol/L


(-3-3)


 


FiO2    35 








Laboratory Tests








Test


 6/14/20


12:11 6/14/20


17:52 6/15/20


00:23 6/15/20


05:45


 


Glucose (Fingerstick)


 159 mg/dL


(70-99) 129 mg/dL


(70-99) 118 mg/dL


(70-99) 





 


White Blood Count


 


 


 


 8.1 x10^3/uL


(4.0-11.0)


 


Red Blood Count


 


 


 


 3.28 x10^6/uL


(3.50-5.40)


 


Hemoglobin


 


 


 


 9.3 g/dL


(12.0-15.5)


 


Hematocrit


 


 


 


 27.8 %


(36.0-47.0)


 


Mean Corpuscular Volume    85 fL () 


 


Mean Corpuscular Hemoglobin    28 pg (25-35) 


 


Mean Corpuscular Hemoglobin


Concent 


 


 


 34 g/dL


(31-37)


 


Red Cell Distribution Width


 


 


 


 18.6 %


(11.5-14.5)


 


Platelet Count


 


 


 


 434 x10^3/uL


(140-400)


 


Neutrophils (%) (Auto)    80 % (31-73) 


 


Lymphocytes (%) (Auto)    15 % (24-48) 


 


Monocytes (%) (Auto)    5 % (0-9) 


 


Eosinophils (%) (Auto)    1 % (0-3) 


 


Basophils (%) (Auto)    0 % (0-3) 


 


Neutrophils # (Auto)


 


 


 


 6.4 x10^3/uL


(1.8-7.7)


 


Lymphocytes # (Auto)


 


 


 


 1.2 x10^3/uL


(1.0-4.8)


 


Monocytes # (Auto)


 


 


 


 0.4 x10^3/uL


(0.0-1.1)


 


Eosinophils # (Auto)


 


 


 


 0.1 x10^3/uL


(0.0-0.7)


 


Basophils # (Auto)


 


 


 


 0.0 x10^3/uL


(0.0-0.2)


 


Sodium Level


 


 


 


 143 mmol/L


(136-145)


 


Potassium Level


 


 


 


 3.3 mmol/L


(3.5-5.1)


 


Chloride Level


 


 


 


 111 mmol/L


()


 


Carbon Dioxide Level


 


 


 


 23 mmol/L


(21-32)


 


Anion Gap    9 (6-14) 


 


Blood Urea Nitrogen


 


 


 


 34 mg/dL


(7-20)


 


Creatinine


 


 


 


 0.7 mg/dL


(0.6-1.0)


 


Estimated GFR


(Cockcroft-Gault) 


 


 


 88.9 





 


BUN/Creatinine Ratio    49 (6-20) 


 


Glucose Level


 


 


 


 125 mg/dL


(70-99)


 


Calcium Level


 


 


 


 8.8 mg/dL


(8.5-10.1)


 


Magnesium Level


 


 


 


 1.8 mg/dL


(1.8-2.4)


 


Total Bilirubin


 


 


 


 0.5 mg/dL


(0.2-1.0)


 


Aspartate Amino Transf


(AST/SGOT) 


 


 


 62 U/L (15-37) 





 


Alanine Aminotransferase


(ALT/SGPT) 


 


 


 72 U/L (14-59) 





 


Alkaline Phosphatase


 


 


 


 132 U/L


()


 


Total Protein


 


 


 


 4.5 g/dL


(6.4-8.2)


 


Albumin


 


 


 


 1.1 g/dL


(3.4-5.0)


 


Albumin/Globulin Ratio    0.3 (1.0-1.7) 


 


Test


 6/15/20


05:54 6/15/20


08:10 


 





 


Glucose (Fingerstick)


 115 mg/dL


(70-99) 


 


 





 


O2 Saturation  97 % (92-99)   


 


Arterial Blood pH


 


 7.46


(7.35-7.45) 


 





 


Arterial Blood pCO2 at


Patient Temp 


 31 mmHg


(35-46) 


 





 


Arterial Blood pO2 at Patient


Temp 


 117 mmHg


() 


 





 


Arterial Blood HCO3


 


 22 mmol/L


(21-28) 


 





 


Arterial Blood Base Excess


 


 -2 mmol/L


(-3-3) 


 





 


FiO2  35   








Problem List


Problems


Medical Problems:


(1) Acute pancreatitis


Status: Acute  





(2) Cholelithiasis


Status: Acute  








Assessment/Plan


CT reviewed--will have IR eval if drains still in good position,? with decreased

output





Justicifation of Admission Dx:


Justifications for Admission:


Justification of Admission Dx:  Yes





KIEL BAL MD 6/15/20 1338:


SURGICAL PROGRESS NOTE


Assessment/Plan


as above


no new surgical recs











SAURAV NASH            Quintin 15, 2020 11:29


KIEL BAL MD                Quintin 15, 2020 13:38

## 2020-06-15 NOTE — NUR
Pharmacy TPN Dosing Note



S: JESENIA BEAN is a 49 year old F Currently receiving Central Continuous TPN started 
03/18/20



B:Pertinent PMH: 

Necrotizing pancreatitis

Height: 5 feet, 8 inches

Weight: 79.0 kg



Current diet: NPO 



LABS:

Sodium:    143 

Potassium: 3.3 

Chloride:  111 

Calcium:   9.2 

Corrected Calcium: 11.52 

Magnesium: 1.8 

CO2:       23 

SCr:       0.7 

Glucose:   115-125 

Albumin:   1.1 

AST:       62 

ALT:       72 



TPN FORMULA:

TPN TYPE:  Central Continuous

AMINO ACIDS:         75 gm

DEXTROSE:            250 gm

LIPIDS:              20 gm

SODIUM CHLORIDE:     - mEq

SODIUM ACETATE:      - mEq

SODIUM PHOSPHATE:    - mmol

POTASSIUM CHLORIDE:  - mEq

POTASSIUM ACETATE:   40 mEq

POTASSIUM PHOSPHATE: - mmol

MAGNESIUM:           5 mEq

CALCIUM:             - mEq

INSULIN:             30 units

MULTIPLE VITAMIN:    10 ml

TRACE ELEMENTS:      1 ml(s)



TPN PLAN:  

Drop in K today, 3.8 to 3.3. Forty meq KCl given by primary. Will not

adjust in TPN for now, will watch trend. Pt on low-dose propofol, being

titrated down and equivalent to 76 kcal/d. Will keep lipid component in

TPN and check triglycerides frequently. Magnesium trending down, was high

yesterday, will watch trend. BMP, mag, trig in AM.





R: Continue TPN AS ABOVE.

Will monitor electrolytes, glucose, and tolerance to TPN.



 CANDACE BELTRE [facility], 06/15/20 [id number]

## 2020-06-15 NOTE — NUR
SS following up with discharge planning. SS reviewed pt chart and discussed with pt RN. Pt 
is currently on the vent. Per RN, pt having leakage out of old ROBERT site. Pt having brownage 
drainage out of drains. Pt remains on IV TPN, Micafungin, and Meropenem. Pt remains Full 
Code. SS will continue to follow for discharge planning.

## 2020-06-15 NOTE — PDOC
PROGRESS NOTES


Assessment


Problems


Medical Problems:


(1) Acute pancreatitis


Status: Acute  





(2) Cholelithiasis


Status: Acute  





Single seizure on 3/6, no recurrence.


Metabolic encephalopathy.


Fevers.


Metabolic acidosis.


Diffuse pulmonary infiltrate.


Pleural effusion.


Pancreatitis, necrotizing.


Gallstone.


Leukocytosis.


Lymphopenia.


Electrolytes imbalances.


Hyperglycemia.


DM.


HTN.


HLD.


Anemia.


Abnormal CXR.


Obesity.


Ascites and pleural effusion


Ileus with vomiting


Anemia 


JED


Hyperkalemia


Metabolic acidosis


Hypertension


S/P trache, back on vent


Anemia


S/P IR drain placement, 6/7


Hypotension, intermittently requiring Levophed


Plan


Keppra if she has further seizures.


Treat medical diseases.


Subjective


Indicates no pain


Objective





Vital Signs








  Date Time  Temp Pulse Resp B/P (MAP) Pulse Ox O2 Delivery O2 Flow Rate FiO2


 


6/15/20 08:00        


 


6/15/20 08:00 98.5 98 20  99 Ventilator  





 98.5       














Intake and Output 


 


 6/15/20





 07:00


 


Intake Total 3353.41 ml


 


Output Total 1861 ml


 


Balance 1492.41 ml


 


 


 


IV Total 3353.41 ml


 


Output Urine Total 1518 ml


 


Drainage Total 343 ml








PHYSICAL EXAM


Alert, follows commands. Tracheostomy shield. Back on vent


PERRL.


EOMI.


CN: no focal findings.


Muscle tone: normal.


Muscle strength: 3-4/5


DTR: 1+


Plantar reflex: Silent


Gait: not examined in bed.


Sensory exam: normal


Cerebellar: normal


Review of Relevant


I have reviewed the following items josy (where applicable) has been applied.


Labs





Laboratory Tests








Test


 6/13/20


11:24 6/13/20


15:08 6/13/20


18:19 6/13/20


20:10


 


Glucose (Fingerstick)


 187 mg/dL


(70-99) 


 220 mg/dL


(70-99) 





 


White Blood Count


 


 9.3 x10^3/uL


(4.0-11.0) 


 8.4 x10^3/uL


(4.0-11.0)


 


Red Blood Count


 


 2.78 x10^6/uL


(3.50-5.40) 


 3.04 x10^6/uL


(3.50-5.40)


 


Hemoglobin


 


 8.1 g/dL


(12.0-15.5) 


 9.0 g/dL


(12.0-15.5)


 


Hematocrit


 


 23.5 %


(36.0-47.0) 


 26.0 %


(36.0-47.0)


 


Mean Corpuscular Volume  85 fL ()   86 fL () 


 


Mean Corpuscular Hemoglobin  29 pg (25-35)   30 pg (25-35) 


 


Mean Corpuscular Hemoglobin


Concent 


 34 g/dL


(31-37) 


 35 g/dL


(31-37)


 


Red Cell Distribution Width


 


 19.1 %


(11.5-14.5) 


 18.7 %


(11.5-14.5)


 


Platelet Count


 


 380 x10^3/uL


(140-400) 


 388 x10^3/uL


(140-400)


 


Magnesium Level


 


 1.5 mg/dL


(1.8-2.4) 


 





 


Test


 6/14/20


00:38 6/14/20


05:30 6/14/20


10:45 6/14/20


12:11


 


Glucose (Fingerstick)


 188 mg/dL


(70-99) 


 


 159 mg/dL


(70-99)


 


White Blood Count


 


 7.5 x10^3/uL


(4.0-11.0) 


 





 


Red Blood Count


 


 2.95 x10^6/uL


(3.50-5.40) 


 





 


Hemoglobin


 


 8.6 g/dL


(12.0-15.5) 


 





 


Hematocrit


 


 25.0 %


(36.0-47.0) 


 





 


Mean Corpuscular Volume  85 fL ()   


 


Mean Corpuscular Hemoglobin  29 pg (25-35)   


 


Mean Corpuscular Hemoglobin


Concent 


 34 g/dL


(31-37) 


 





 


Red Cell Distribution Width


 


 18.0 %


(11.5-14.5) 


 





 


Platelet Count


 


 382 x10^3/uL


(140-400) 


 





 


Neutrophils (%) (Auto)  79 % (31-73)   


 


Lymphocytes (%) (Auto)  17 % (24-48)   


 


Monocytes (%) (Auto)  4 % (0-9)   


 


Eosinophils (%) (Auto)  0 % (0-3)   


 


Basophils (%) (Auto)  0 % (0-3)   


 


Neutrophils # (Auto)


 


 5.9 x10^3/uL


(1.8-7.7) 


 





 


Lymphocytes # (Auto)


 


 1.3 x10^3/uL


(1.0-4.8) 


 





 


Monocytes # (Auto)


 


 0.3 x10^3/uL


(0.0-1.1) 


 





 


Eosinophils # (Auto)


 


 0.0 x10^3/uL


(0.0-0.7) 


 





 


Basophils # (Auto)


 


 0.0 x10^3/uL


(0.0-0.2) 


 





 


Sodium Level


 


 145 mmol/L


(136-145) 


 





 


Potassium Level


 


 3.8 mmol/L


(3.5-5.1) 


 





 


Chloride Level


 


 113 mmol/L


() 


 





 


Carbon Dioxide Level


 


 24 mmol/L


(21-32) 


 





 


Anion Gap  8 (6-14)   


 


Blood Urea Nitrogen


 


 36 mg/dL


(7-20) 


 





 


Creatinine


 


 0.8 mg/dL


(0.6-1.0) 


 





 


Estimated GFR


(Cockcroft-Gault) 


 76.2 


 


 





 


BUN/Creatinine Ratio  45 (6-20)   


 


Glucose Level


 


 227 mg/dL


(70-99) 


 





 


Calcium Level


 


 9.2 mg/dL


(8.5-10.1) 


 





 


Phosphorus Level


 


 3.7 mg/dL


(2.6-4.7) 


 





 


Magnesium Level


 


 2.5 mg/dL


(1.8-2.4) 


 





 


Total Bilirubin


 


 0.4 mg/dL


(0.2-1.0) 


 





 


Aspartate Amino Transf


(AST/SGOT) 


 31 U/L (15-37) 


 


 





 


Alanine Aminotransferase


(ALT/SGPT) 


 29 U/L (14-59) 


 


 





 


Alkaline Phosphatase


 


 82 U/L


() 


 





 


Total Protein


 


 4.6 g/dL


(6.4-8.2) 


 





 


Albumin


 


 1.0 g/dL


(3.4-5.0) 


 





 


Albumin/Globulin Ratio  0.3 (1.0-1.7)   


 


O2 Saturation   98 % (92-99)  


 


Arterial Blood pH


 


 


 7.45


(7.35-7.45) 





 


Arterial Blood pCO2 at


Patient Temp 


 


 34 mmHg


(35-46) 





 


Arterial Blood pO2 at Patient


Temp 


 


 146 mmHg


() 





 


Arterial Blood HCO3


 


 


 23 mmol/L


(21-28) 





 


Arterial Blood Base Excess


 


 


 -1 mmol/L


(-3-3) 





 


FiO2   40  


 


Test


 6/14/20


17:52 6/15/20


00:23 6/15/20


05:45 6/15/20


05:54


 


Glucose (Fingerstick)


 129 mg/dL


(70-99) 118 mg/dL


(70-99) 


 115 mg/dL


(70-99)


 


White Blood Count


 


 


 8.1 x10^3/uL


(4.0-11.0) 





 


Red Blood Count


 


 


 3.28 x10^6/uL


(3.50-5.40) 





 


Hemoglobin


 


 


 9.3 g/dL


(12.0-15.5) 





 


Hematocrit


 


 


 27.8 %


(36.0-47.0) 





 


Mean Corpuscular Volume   85 fL ()  


 


Mean Corpuscular Hemoglobin   28 pg (25-35)  


 


Mean Corpuscular Hemoglobin


Concent 


 


 34 g/dL


(31-37) 





 


Red Cell Distribution Width


 


 


 18.6 %


(11.5-14.5) 





 


Platelet Count


 


 


 434 x10^3/uL


(140-400) 





 


Neutrophils (%) (Auto)   80 % (31-73)  


 


Lymphocytes (%) (Auto)   15 % (24-48)  


 


Monocytes (%) (Auto)   5 % (0-9)  


 


Eosinophils (%) (Auto)   1 % (0-3)  


 


Basophils (%) (Auto)   0 % (0-3)  


 


Neutrophils # (Auto)


 


 


 6.4 x10^3/uL


(1.8-7.7) 





 


Lymphocytes # (Auto)


 


 


 1.2 x10^3/uL


(1.0-4.8) 





 


Monocytes # (Auto)


 


 


 0.4 x10^3/uL


(0.0-1.1) 





 


Eosinophils # (Auto)


 


 


 0.1 x10^3/uL


(0.0-0.7) 





 


Basophils # (Auto)


 


 


 0.0 x10^3/uL


(0.0-0.2) 





 


Sodium Level


 


 


 143 mmol/L


(136-145) 





 


Potassium Level


 


 


 3.3 mmol/L


(3.5-5.1) 





 


Chloride Level


 


 


 111 mmol/L


() 





 


Carbon Dioxide Level


 


 


 23 mmol/L


(21-32) 





 


Anion Gap   9 (6-14)  


 


Blood Urea Nitrogen


 


 


 34 mg/dL


(7-20) 





 


Creatinine


 


 


 0.7 mg/dL


(0.6-1.0) 





 


Estimated GFR


(Cockcroft-Gault) 


 


 88.9 


 





 


BUN/Creatinine Ratio   49 (6-20)  


 


Glucose Level


 


 


 125 mg/dL


(70-99) 





 


Calcium Level


 


 


 8.8 mg/dL


(8.5-10.1) 





 


Magnesium Level


 


 


 1.8 mg/dL


(1.8-2.4) 





 


Total Bilirubin


 


 


 0.5 mg/dL


(0.2-1.0) 





 


Aspartate Amino Transf


(AST/SGOT) 


 


 62 U/L (15-37) 


 





 


Alanine Aminotransferase


(ALT/SGPT) 


 


 72 U/L (14-59) 


 





 


Alkaline Phosphatase


 


 


 132 U/L


() 





 


Total Protein


 


 


 4.5 g/dL


(6.4-8.2) 





 


Albumin


 


 


 1.1 g/dL


(3.4-5.0) 





 


Albumin/Globulin Ratio   0.3 (1.0-1.7)  


 


Test


 6/15/20


08:10 


 


 





 


O2 Saturation 97 % (92-99)    


 


Arterial Blood pH


 7.46


(7.35-7.45) 


 


 





 


Arterial Blood pCO2 at


Patient Temp 31 mmHg


(35-46) 


 


 





 


Arterial Blood pO2 at Patient


Temp 117 mmHg


() 


 


 





 


Arterial Blood HCO3


 22 mmol/L


(21-28) 


 


 





 


Arterial Blood Base Excess


 -2 mmol/L


(-3-3) 


 


 





 


FiO2 35    








Laboratory Tests








Test


 6/14/20


10:45 6/14/20


12:11 6/14/20


17:52 6/15/20


00:23


 


O2 Saturation 98 % (92-99)    


 


Arterial Blood pH


 7.45


(7.35-7.45) 


 


 





 


Arterial Blood pCO2 at


Patient Temp 34 mmHg


(35-46) 


 


 





 


Arterial Blood pO2 at Patient


Temp 146 mmHg


() 


 


 





 


Arterial Blood HCO3


 23 mmol/L


(21-28) 


 


 





 


Arterial Blood Base Excess


 -1 mmol/L


(-3-3) 


 


 





 


FiO2 40    


 


Glucose (Fingerstick)


 


 159 mg/dL


(70-99) 129 mg/dL


(70-99) 118 mg/dL


(70-99)


 


Test


 6/15/20


05:45 6/15/20


05:54 6/15/20


08:10 





 


White Blood Count


 8.1 x10^3/uL


(4.0-11.0) 


 


 





 


Red Blood Count


 3.28 x10^6/uL


(3.50-5.40) 


 


 





 


Hemoglobin


 9.3 g/dL


(12.0-15.5) 


 


 





 


Hematocrit


 27.8 %


(36.0-47.0) 


 


 





 


Mean Corpuscular Volume 85 fL ()    


 


Mean Corpuscular Hemoglobin 28 pg (25-35)    


 


Mean Corpuscular Hemoglobin


Concent 34 g/dL


(31-37) 


 


 





 


Red Cell Distribution Width


 18.6 %


(11.5-14.5) 


 


 





 


Platelet Count


 434 x10^3/uL


(140-400) 


 


 





 


Neutrophils (%) (Auto) 80 % (31-73)    


 


Lymphocytes (%) (Auto) 15 % (24-48)    


 


Monocytes (%) (Auto) 5 % (0-9)    


 


Eosinophils (%) (Auto) 1 % (0-3)    


 


Basophils (%) (Auto) 0 % (0-3)    


 


Neutrophils # (Auto)


 6.4 x10^3/uL


(1.8-7.7) 


 


 





 


Lymphocytes # (Auto)


 1.2 x10^3/uL


(1.0-4.8) 


 


 





 


Monocytes # (Auto)


 0.4 x10^3/uL


(0.0-1.1) 


 


 





 


Eosinophils # (Auto)


 0.1 x10^3/uL


(0.0-0.7) 


 


 





 


Basophils # (Auto)


 0.0 x10^3/uL


(0.0-0.2) 


 


 





 


Sodium Level


 143 mmol/L


(136-145) 


 


 





 


Potassium Level


 3.3 mmol/L


(3.5-5.1) 


 


 





 


Chloride Level


 111 mmol/L


() 


 


 





 


Carbon Dioxide Level


 23 mmol/L


(21-32) 


 


 





 


Anion Gap 9 (6-14)    


 


Blood Urea Nitrogen


 34 mg/dL


(7-20) 


 


 





 


Creatinine


 0.7 mg/dL


(0.6-1.0) 


 


 





 


Estimated GFR


(Cockcroft-Gault) 88.9 


 


 


 





 


BUN/Creatinine Ratio 49 (6-20)    


 


Glucose Level


 125 mg/dL


(70-99) 


 


 





 


Calcium Level


 8.8 mg/dL


(8.5-10.1) 


 


 





 


Magnesium Level


 1.8 mg/dL


(1.8-2.4) 


 


 





 


Total Bilirubin


 0.5 mg/dL


(0.2-1.0) 


 


 





 


Aspartate Amino Transf


(AST/SGOT) 62 U/L (15-37) 


 


 


 





 


Alanine Aminotransferase


(ALT/SGPT) 72 U/L (14-59) 


 


 


 





 


Alkaline Phosphatase


 132 U/L


() 


 


 





 


Total Protein


 4.5 g/dL


(6.4-8.2) 


 


 





 


Albumin


 1.1 g/dL


(3.4-5.0) 


 


 





 


Albumin/Globulin Ratio 0.3 (1.0-1.7)    


 


Glucose (Fingerstick)


 


 115 mg/dL


(70-99) 


 





 


O2 Saturation   97 % (92-99)  


 


Arterial Blood pH


 


 


 7.46


(7.35-7.45) 





 


Arterial Blood pCO2 at


Patient Temp 


 


 31 mmHg


(35-46) 





 


Arterial Blood pO2 at Patient


Temp 


 


 117 mmHg


() 





 


Arterial Blood HCO3


 


 


 22 mmol/L


(21-28) 





 


Arterial Blood Base Excess


 


 


 -2 mmol/L


(-3-3) 





 


FiO2   35  








Microbiology


6/13/20 Gram Stain Evaluation - Final, Resulted


          


6/13/20 Respiratory Culture, Resulted


          Pending


6/7/20 Gram Stain - Final, Complete


         


6/7/20 Aerobic and Anaerobic Culture - Final, Complete


         


6/7/20 Antimicrobic Susceptibility - Final, Complete


         


6/7/20 Blood Culture - Final, Complete


         NO GROWTH AFTER 5 DAYS


6/7/20 Urine Culture - Final, Complete


         


5/30/20 Gram Stain - Final, Complete


          


5/30/20 Aerobic Culture - Final, Complete


Medications





Current Medications


Sodium Chloride 1,000 ml @  1,000 mls/hr Q1H IV  Last administered on 3/16/20at 

03:00;  Start 3/16/20 at 03:00;  Stop 3/16/20 at 03:59;  Status DC


Ondansetron HCl (Zofran) 4 mg 1X  ONCE IVP  Last administered on 3/16/20at 

03:27;  Start 3/16/20 at 03:00;  Stop 3/16/20 at 03:01;  Status DC


Morphine Sulfate (Morphine Sulfate) 4 mg 1X  ONCE IV ;  Start 3/16/20 at 03:00; 

Stop 3/16/20 at 03:01;  Status Cancel


Ketorolac Tromethamine (Toradol 30mg Vial) 30 mg 1X  ONCE IV  Last administered 

on 3/16/20at 02:54;  Start 3/16/20 at 03:00;  Stop 3/16/20 at 03:01;  Status DC


Fentanyl Citrate (Fentanyl 2ml Vial) 25 mcg 1X  ONCE IVP  Last administered on 

3/16/20at 03:23;  Start 3/16/20 at 03:30;  Stop 3/16/20 at 03:31;  Status DC


Fentanyl Citrate (Fentanyl 2ml Vial) 100 mcg STK-MED ONCE .ROUTE ;  Start 

3/16/20 at 03:18;  Stop 3/16/20 at 03:18;  Status DC


Iohexol (Omnipaque 350 Mg/ml) 90 ml 1X  ONCE IV  Last administered on 3/16/20at 

03:25;  Start 3/16/20 at 03:30;  Stop 3/16/20 at 03:31;  Status DC


Info (CONTRAST GIVEN -- Rx MONITORING) 1 each PRN DAILY  PRN MC SEE COMMENTS;  

Start 3/16/20 at 03:30;  Stop 3/18/20 at 03:29;  Status DC


Hydromorphone HCl (Dilaudid) 0.5 mg 1X  ONCE IV  Last administered on 3/16/20at 

03:55;  Start 3/16/20 at 04:30;  Stop 3/16/20 at 04:32;  Status DC


Ondansetron HCl (Zofran) 4 mg PRN Q8HRS  PRN IV NAUSEA/VOMITING 1ST CHOICE;  

Start 3/16/20 at 05:00;  Stop 3/16/20 at 09:27;  Status DC


Morphine Sulfate (Morphine Sulfate) 2 mg PRN Q2HR  PRN IV SEVERE PAIN 7-10 Last 

administered on 3/17/20at 12:26;  Start 3/16/20 at 05:00;  Stop 3/17/20 at 

14:15;  Status DC


Sodium Chloride 1,000 ml @  125 mls/hr Q8H IV  Last administered on 3/16/20at 

20:56;  Start 3/16/20 at 05:00;  Stop 3/17/20 at 04:59;  Status DC


Hydromorphone HCl (Dilaudid) 0.5 mg PRN Q3HRS  PRN IV SEVERE PAIN 7-10 Last 

administered on 3/17/20at 10:06;  Start 3/16/20 at 05:00;  Stop 3/17/20 at 

12:01;  Status DC


Piperacillin Sod/ Tazobactam Sod 4.5 gm/Sodium Chloride 100 ml @  200 mls/hr 1X 

ONCE IV  Last administered on 3/16/20at 05:44;  Start 3/16/20 at 06:00;  Stop 

3/16/20 at 06:29;  Status DC


Ondansetron HCl (Zofran) 4 mg PRN Q4HRS  PRN IV NAUSEA/VOMITING 1ST CHOICE Last 

administered on 6/14/20at 16:02;  Start 3/16/20 at 09:30


Insulin Human Lispro (HumaLOG) 0-9 UNITS Q6HRS SQ  Last administered on 

6/14/20at 12:21;  Start 3/16/20 at 09:30


Dextrose (Dextrose 50%-Water Syringe) 12.5 gm PRN Q15MIN  PRN IV SEE COMMENTS;  

Start 3/16/20 at 09:30


Pantoprazole Sodium (PROTONIX VIAL for IV PUSH) 40 mg DAILYAC IVP  Last 

administered on 6/15/20at 08:31;  Start 3/16/20 at 11:30


Prochlorperazine Edisylate (Compazine) 10 mg PRN Q6HRS  PRN IV NAUSEA/VOMITING, 

2nd CHOICE Last administered on 6/10/20at 14:33;  Start 3/16/20 at 17:45


Atenolol (Tenormin) 100 mg DAILY PO ;  Start 3/17/20 at 09:00;  Stop 3/16/20 at 

20:08;  Status DC


Metoprolol Tartrate (Lopressor Vial) 2.5 mg Q6HRS IVP  Last administered on 

3/17/20at 05:51;  Start 3/16/20 at 20:15;  Stop 3/17/20 at 10:02;  Status DC


Metoprolol Tartrate (Lopressor Vial) 5 mg Q6HRS IVP  Last administered on 

3/26/20at 00:12;  Start 3/17/20 at 10:15;  Stop 3/28/20 at 08:48;  Status DC


Hydromorphone HCl (Dilaudid) 1 mg PRN Q3HRS  PRN IV SEVERE PAIN 7-10 Last 

administered on 3/23/20at 05:13;  Start 3/17/20 at 12:00;  Stop 3/31/20 at 

00:25;  Status DC


Lidocaine HCl (Buffered Lidocaine 1%) 3 ml STK-MED ONCE .ROUTE ;  Start 3/17/20 

at 12:55;  Stop 3/17/20 at 12:56;  Status DC


Albumin Human 500 ml @  125 mls/hr 1X  ONCE IV  Last administered on 3/17/20at 1

4:33;  Start 3/17/20 at 14:30;  Stop 3/17/20 at 18:32;  Status DC


Norepinephrine Bitartrate 8 mg/ Dextrose 258 ml @  17.299 mls/ hr CONT  PRN IV 

PER PROTOCOL Last administered on 4/14/20at 12:48;  Start 3/17/20 at 15:30;  

Stop 4/17/20 at 09:19;  Status DC


Sodium Chloride 1,000 ml @  125 mls/hr Q8H IV  Last administered on 3/17/20at 

21:04;  Start 3/17/20 at 16:00;  Stop 3/18/20 at 02:42;  Status DC


Albumin Human 500 ml @  125 mls/hr PRN BID  PRN IV After every 2L NSS & BP < 

90mm Last administered on 6/6/20at 11:40;  Start 3/17/20 at 16:00


Iohexol (Omnipaque 300 Mg/ml) 60 ml 1X  ONCE IV  Last administered on 3/17/20at 

17:20;  Start 3/17/20 at 17:00;  Stop 3/17/20 at 17:01;  Status DC


Info (CONTRAST GIVEN -- Rx MONITORING) 1 each PRN DAILY  PRN MC SEE COMMENTS;  

Start 3/17/20 at 17:00;  Stop 3/19/20 at 16:59;  Status DC


Meropenem 1 gm/ Sodium Chloride 100 ml @  200 mls/hr Q8HRS IV  Last administered

on 3/18/20at 05:45;  Start 3/17/20 at 20:00;  Stop 3/18/20 at 08:48;  Status DC


Furosemide (Lasix) 40 mg 1X  ONCE IVP  Last administered on 3/17/20at 22:12;  

Start 3/17/20 at 22:30;  Stop 3/17/20 at 22:31;  Status DC


Calcium Chloride 1000 mg/Sodium Chloride 110 ml @  220 mls/hr 1X  ONCE IV  Last 

administered on 3/17/20at 22:11;  Start 3/17/20 at 22:30;  Stop 3/17/20 at 

22:59;  Status DC


Albuterol Sulfate (Ventolin Neb Soln) 2.5 mg 1X  ONCE NEB  Last administered on 

3/18/20at 00:56;  Start 3/17/20 at 22:30;  Stop 3/17/20 at 22:31;  Status DC


Insulin Human Regular (HumuLIN R VIAL) 5 unit 1X  ONCE IV  Last administered on 

3/17/20at 22:14;  Start 3/17/20 at 22:30;  Stop 3/17/20 at 22:31;  Status DC


Magnesium Sulfate 50 ml @ 25 mls/hr 1X  ONCE IV  Last administered on 3/18/20at 

02:57;  Start 3/18/20 at 03:00;  Stop 3/18/20 at 04:59;  Status DC


Calcium Gluconate 1000 mg/Sodium Chloride 110 ml @  220 mls/hr 1X  ONCE IV  Last

administered on 3/18/20at 02:46;  Start 3/18/20 at 03:00;  Stop 3/18/20 at 03:

29;  Status DC


Sodium Chloride 1,000 ml @  200 mls/hr Q5H IV  Last administered on 3/18/20at 

02:46;  Start 3/18/20 at 03:00;  Stop 3/18/20 at 10:21;  Status DC


Calcium Gluconate 1000 mg/Sodium Chloride 110 ml @  220 mls/hr 1X  ONCE IV  Last

administered on 3/18/20at 03:21;  Start 3/18/20 at 03:30;  Stop 3/18/20 at 

03:59;  Status DC


Sodium Bicarbonate 50 meq/Sodium Chloride 1,050 ml @  75 mls/hr Q14H IV  Last 

administered on 3/22/20at 21:10;  Start 3/18/20 at 07:30;  Stop 3/23/20 at 

10:28;  Status DC


Calcium Gluconate 2000 mg/Sodium Chloride 120 ml @  220 mls/hr 1X  ONCE IV  Last

administered on 3/18/20at 09:05;  Start 3/18/20 at 07:30;  Stop 3/18/20 at 

08:02;  Status DC


Lidocaine HCl (Xylocaine-Mpf 1% 2ml Vial) 2 ml STK-MED ONCE .ROUTE ;  Start 

3/18/20 at 08:47;  Stop 3/18/20 at 08:47;  Status DC


Meropenem 500 mg/ Sodium Chloride 50 ml @  100 mls/hr Q12HR IV  Last 

administered on 3/23/20at 21:01;  Start 3/18/20 at 18:00;  Stop 3/24/20 at 

07:58;  Status DC


Lidocaine HCl (Buffered Lidocaine 1%) 3 ml STK-MED ONCE .ROUTE ;  Start 3/18/20 

at 09:46;  Stop 3/18/20 at 09:46;  Status DC


Lidocaine HCl (Buffered Lidocaine 1%) 6 ml 1X  ONCE INJ  Last administered on 

3/18/20at 10:26;  Start 3/18/20 at 10:15;  Stop 3/18/20 at 10:16;  Status DC


Info (Tpn Per Pharmacy) 1 each PRN DAILY  PRN MC SEE COMMENTS Last administered 

on 6/14/20at 10:30;  Start 3/18/20 at 12:00


Sodium Chloride 1,000 ml @  1,000 mls/hr Q1H PRN IV hypotension;  Start 3/18/20 

at 12:07;  Stop 3/18/20 at 18:06;  Status DC


Diphenhydramine HCl (Benadryl) 25 mg 1X PRN  PRN IV ITCHING;  Start 3/18/20 at 

12:15;  Stop 3/19/20 at 12:14;  Status DC


Diphenhydramine HCl (Benadryl) 25 mg 1X PRN  PRN IV ITCHING;  Start 3/18/20 at 

12:15;  Stop 3/19/20 at 12:14;  Status DC


Sodium Chloride 1,000 ml @  400 mls/hr Q2H30M PRN IV PATENCY;  Start 3/18/20 at 

12:07;  Stop 3/19/20 at 00:06;  Status DC


Info (PHARMACY MONITORING -- do not chart) 1 each PRN DAILY  PRN MC SEE 

COMMENTS;  Start 3/18/20 at 12:15;  Stop 3/20/20 at 08:13;  Status DC


Sodium Chloride 90 meq/Calcium Gluconate 10 meq/ Multivitamins 10 ml/Chromium/ 

Copper/Manganese/ Seleni/Zn 1 ml/ Total Parenteral Nutrition/Amino 

Acids/Dextrose/ Fat Emulsion Intravenous 55.005 ml  @ 2.292 mls/hr TPN  CONT IV 

;  Start 3/18/20 at 22:00;  Stop 3/18/20 at 12:33;  Status DC


Info (Tpn Per Pharmacy) 1 each PRN DAILY  PRN MC SEE COMMENTS;  Start 3/18/20 at

12:30;  Status UNV


Sodium Chloride 90 meq/Calcium Gluconate 10 meq/ Multivitamins 10 ml/Chromium/ 

Copper/Manganese/ Seleni/Zn 0.5 ml/ Total Parenteral Nutrition/Amino 

Acids/Dextrose/ Fat Emulsion Intravenous 1,512 ml @  63 mls/hr TPN  CONT IV  

Last administered on 3/18/20at 22:06;  Start 3/18/20 at 22:00;  Stop 3/19/20 at 

21:59;  Status DC


Calcium Carbonate/ Glycine (Tums) 500 mg PRN AFTMEALHC  PRN PO INDIGESTION;  

Start 3/18/20 at 17:45;  Stop 5/13/20 at 10:25;  Status DC


Calcium Gluconate (Calcium Gluconate) 2,000 mg 1X  ONCE IVP  Last administered 

on 3/19/20at 02:19;  Start 3/19/20 at 02:15;  Stop 3/19/20 at 02:16;  Status DC


Calcium Chloride 3000 mg/Sodium Chloride 1,030 ml @  50 mls/hr [id number] IV  Last 

administered on 3/21/20at 02:17;  Start 3/19/20 at 08:00;  Stop 3/21/20 at 

15:23;  Status DC


Lorazepam (Ativan Inj) 1 mg PRN Q4HRS  PRN IVP ANXIETY / AGITATION, 2nd choic 

Last administered on 4/17/20at 03:51;  Start 3/19/20 at 09:00;  Stop 4/17/20 at 

09:19;  Status DC


Sodium Chloride 1,000 ml @  1,000 mls/hr Q1H PRN IV hypotension;  Start 3/19/20 

at 08:56;  Stop 3/19/20 at 14:55;  Status DC


Albumin Human 200 ml @  200 mls/hr 1X PRN  PRN IV Hypotension;  Start 3/19/20 at

09:00;  Stop 3/19/20 at 14:59;  Status DC


Diphenhydramine HCl (Benadryl) 25 mg 1X PRN  PRN IV ITCHING;  Start 3/19/20 at 

09:00;  Stop 3/20/20 at 08:59;  Status DC


Diphenhydramine HCl (Benadryl) 25 mg 1X PRN  PRN IV ITCHING;  Start 3/19/20 at 

09:00;  Stop 3/20/20 at 08:59;  Status DC


Sodium Chloride 1,000 ml @  400 mls/hr Q2H30M PRN IV PATENCY;  Start 3/19/20 at 

08:56;  Stop 3/19/20 at 20:55;  Status DC


Info (PHARMACY MONITORING -- do not chart) 1 each PRN DAILY  PRN MC SEE 

COMMENTS;  Start 3/19/20 at 09:00;  Status UNV


Info (PHARMACY MONITORING -- do not chart) 1 each PRN DAILY  PRN MC SEE 

COMMENTS;  Start 3/19/20 at 09:00;  Stop 3/20/20 at 08:13;  Status DC


Digoxin (Lanoxin) 500 mcg 1X  ONCE IV  Last administered on 3/19/20at 10:04;  

Start 3/19/20 at 10:00;  Stop 3/19/20 at 10:01;  Status DC


Digoxin (Lanoxin) 125 mcg 1X  ONCE IV  Last administered on 3/19/20at 17:10;  

Start 3/19/20 at 18:00;  Stop 3/19/20 at 18:01;  Status DC


Magnesium Sulfate 100 ml @  25 mls/hr 1X  ONCE IV  Last administered on 

3/19/20at 12:48;  Start 3/19/20 at 13:00;  Stop 3/19/20 at 16:59;  Status DC


Sodium Chloride 90 meq/Magnesium Sulfate 10 meq/ Calcium Gluconate 20 meq/ Multi

vitamins 10 ml/Chromium/ Copper/Manganese/ Seleni/Zn 0.5 ml/ Total Parenteral 

Nutrition/Amino Acids/Dextrose/ Fat Emulsion Intravenous 1,512 ml @  63 mls/hr 

TPN  CONT IV  Last administered on 3/19/20at 22:25;  Start 3/19/20 at 22:00;  

Stop 3/20/20 at 21:59;  Status DC


Sodium Chloride 1,000 ml @  1,000 mls/hr Q1H PRN IV hypotension;  Start 3/20/20 

at 08:05;  Stop 3/20/20 at 14:04;  Status DC


Albumin Human 200 ml @  200 mls/hr 1X  ONCE IV  Last administered on 3/20/20at 

08:57;  Start 3/20/20 at 08:15;  Stop 3/20/20 at 09:14;  Status DC


Diphenhydramine HCl (Benadryl) 25 mg 1X PRN  PRN IV ITCHING;  Start 3/20/20 at 

08:15;  Stop 3/21/20 at 08:14;  Status DC


Diphenhydramine HCl (Benadryl) 25 mg 1X PRN  PRN IV ITCHING;  Start 3/20/20 at 

08:15;  Stop 3/21/20 at 08:14;  Status DC


Sodium Chloride 1,000 ml @  400 mls/hr Q2H30M PRN IV PATENCY;  Start 3/20/20 at 

08:05;  Stop 3/20/20 at 20:04;  Status DC


Info (PHARMACY MONITORING -- do not chart) 1 each PRN DAILY  PRN MC SEE 

COMMENTS;  Start 3/20/20 at 08:15;  Stop 3/24/20 at 07:57;  Status DC


Sodium Chloride 90 meq/Potassium Chloride 15 meq/ Potassium Phosphate 10 mmol/ 

Magnesium Sulfate 10 meq/Calcium Gluconate 20 meq/ Multivitamins 10 ml/Chromium/

Copper/Manganese/ Seleni/Zn 0.5 ml/ Total Parenteral Nutrition/Amino Acids

/Dextrose/ Fat Emulsion Intravenous 1,512 ml @  63 mls/hr TPN  CONT IV  Last 

administered on 3/20/20at 21:01;  Start 3/20/20 at 22:00;  Stop 3/21/20 at 

21:59;  Status DC


Potassium Chloride/Water 100 ml @  100 mls/hr 1X  ONCE IV  Last administered on 

3/20/20at 14:09;  Start 3/20/20 at 14:00;  Stop 3/20/20 at 14:59;  Status DC


Benzocaine (Hurricaine One) 1 spray 1X  ONCE MM  Last administered on 3/20/20at 

16:38;  Start 3/20/20 at 14:30;  Stop 3/20/20 at 14:31;  Status DC


Lidocaine HCl (Glydo (Lidocaine) Jelly) 1 thomas 1X  ONCE MM  Last administered on 

3/20/20at 16:38;  Start 3/20/20 at 14:30;  Stop 3/20/20 at 14:31;  Status DC


Linezolid/Dextrose 300 ml @  300 mls/hr Q12HR IV  Last administered on 3/26/20at

21:04;  Start 3/20/20 at 20:00;  Stop 3/27/20 at 07:50;  Status DC


Acetaminophen (Tylenol) 650 mg PRN Q6HRS  PRN PO MILD PAIN / TEMP;  Start 

3/21/20 at 03:30;  Stop 3/21/20 at 03:36;  Status DC


Acetaminophen (Tylenol) 650 mg PRN Q6HRS  PRN PEG MILD PAIN / TEMP Last 

administered on 4/16/20at 19:56;  Start 3/21/20 at 03:36;  Stop 5/13/20 at 

10:25;  Status DC


Sodium Chloride 1,000 ml @  1,000 mls/hr Q1H PRN IV hypotension;  Start 3/21/20 

at 07:50;  Stop 3/21/20 at 13:49;  Status DC


Albumin Human 200 ml @  200 mls/hr 1X PRN  PRN IV Hypotension;  Start 3/21/20 at

08:00;  Stop 3/21/20 at 13:59;  Status DC


Sodium Chloride (Normal Saline Flush) 10 ml 1X PRN  PRN IV AP catheter pack;  

Start 3/21/20 at 08:00;  Stop 3/22/20 at 07:59;  Status DC


Sodium Chloride (Normal Saline Flush) 10 ml 1X PRN  PRN IV  catheter pack;  St

art 3/21/20 at 08:00;  Stop 3/22/20 at 07:59;  Status DC


Sodium Chloride 1,000 ml @  400 mls/hr Q2H30M PRN IV PATENCY;  Start 3/21/20 at 

07:50;  Stop 3/21/20 at 19:49;  Status DC


Info (PHARMACY MONITORING -- do not chart) 1 each PRN DAILY  PRN MC SEE COMME

NTS;  Start 3/21/20 at 08:00;  Status UNV


Info (PHARMACY MONITORING -- do not chart) 1 each PRN DAILY  PRN MC SEE 

COMMENTS;  Start 3/21/20 at 08:00;  Stop 3/23/20 at 08:25;  Status DC


Sodium Chloride 90 meq/Potassium Chloride 15 meq/ Potassium Phosphate 10 mmol/ 

Magnesium Sulfate 10 meq/Calcium Gluconate 20 meq/ Multivitamins 10 ml/Chromium/

Copper/Manganese/ Seleni/Zn 0.5 ml/ Total Parenteral Nutrition/Amino 

Acids/Dextrose/ Fat Emulsion Intravenous 1,512 ml @  63 mls/hr TPN  CONT IV  

Last administered on 3/21/20at 20:57;  Start 3/21/20 at 22:00;  Stop 3/22/20 at 

21:59;  Status DC


Sodium Chloride 90 meq/Potassium Chloride 15 meq/ Potassium Phosphate 15 mmol/ 

Magnesium Sulfate 10 meq/Calcium Gluconate 20 meq/ Multivitamins 10 ml/Chromium/

Copper/Manganese/ Seleni/Zn 0.5 ml/ Total Parenteral Nutrition/Amino 

Acids/Dextrose/ Fat Emulsion Intravenous 1,512 ml @  63 mls/hr TPN  CONT IV ;  

Start 3/22/20 at 22:00;  Stop 3/22/20 at 14:16;  Status DC


Sodium Chloride 90 meq/Potassium Chloride 15 meq/ Potassium Phosphate 15 mmol/ 

Magnesium Sulfate 10 meq/Calcium Gluconate 20 meq/ Multivitamins 10 ml/Chromium/

Copper/Manganese/ Seleni/Zn 0.5 ml/ Total Parenteral Nutrition/Amino 

Acids/Dextrose/ Fat Emulsion Intravenous 1,200 ml @  50 mls/hr TPN  CONT IV ;  

Start 3/22/20 at 22:00;  Stop 3/22/20 at 14:17;  Status DC


Sodium Chloride 90 meq/Potassium Chloride 15 meq/ Potassium Phosphate 10 mmol/ 

Magnesium Sulfate 10 meq/Calcium Gluconate 20 meq/ Multivitamins 10 ml/Chromium/

Copper/Manganese/ Seleni/Zn 0.5 ml/ Total Parenteral Nutrition/Amino 

Acids/Dextrose/ Fat Emulsion Intravenous 1,200 ml @  50 mls/hr TPN  CONT IV  

Last administered on 3/22/20at 23:29;  Start 3/22/20 at 22:00;  Stop 3/23/20 at 

21:59;  Status DC


Sodium Chloride 1,000 ml @  1,000 mls/hr Q1H PRN IV hypotension;  Start 3/23/20 

at 07:28;  Stop 3/23/20 at 13:27;  Status DC


Albumin Human 200 ml @  200 mls/hr 1X  ONCE IV  Last administered on 3/23/20at 

08:51;  Start 3/23/20 at 07:30;  Stop 3/23/20 at 08:29;  Status DC


Diphenhydramine HCl (Benadryl) 25 mg 1X PRN  PRN IV ITCHING;  Start 3/23/20 at 

07:30;  Stop 3/24/20 at 07:29;  Status DC


Diphenhydramine HCl (Benadryl) 25 mg 1X PRN  PRN IV ITCHING;  Start 3/23/20 at 

07:30;  Stop 3/24/20 at 07:29;  Status DC


Sodium Chloride 1,000 ml @  400 mls/hr Q2H30M PRN IV PATENCY;  Start 3/23/20 at 

07:28;  Stop 3/23/20 at 19:27;  Status DC


Info (PHARMACY MONITORING -- do not chart) 1 each PRN DAILY  PRN MC SEE 

COMMENTS;  Start 3/23/20 at 07:30;  Stop 4/3/20 at 13:01;  Status DC


Metronidazole 100 ml @  100 mls/hr Q6HRS IV  Last administered on 4/8/20at 

06:26;  Start 3/23/20 at 08:30;  Stop 4/8/20 at 09:58;  Status DC


Micafungin Sodium 100 mg/Dextrose 100 ml @  100 mls/hr Q24H IV  Last 

administered on 4/30/20at 08:18;  Start 3/23/20 at 09:00;  Stop 4/30/20 at 

20:58;  Status DC


Propofol 0 ml @ As Directed STK-MED ONCE IV ;  Start 3/23/20 at 07:53;  Stop 

3/23/20 at 07:53;  Status DC


Etomidate (Amidate) 20 mg STK-MED ONCE IV ;  Start 3/23/20 at 07:53;  Stop 

3/23/20 at 07:54;  Status DC


Midazolam HCl (Versed) 5 mg STK-MED ONCE .ROUTE ;  Start 3/23/20 at 07:57;  Stop

3/23/20 at 07:57;  Status DC


Fentanyl Citrate 30 ml @ 0 mls/hr CONT  PRN IV SEE PROTOCOL Last administered on

4/17/20at 06:12;  Start 3/23/20 at 08:15;  Stop 4/17/20 at 09:19;  Status DC


Artificial Tears (Artificial Tears) 1 drop PRN Q1HR  PRN OU DRY EYE, 1st choice;

 Start 3/23/20 at 08:15;  Stop 4/29/20 at 05:31;  Status DC


Midazolam HCl 50 mg/Sodium Chloride 50 ml @ 0 mls/hr CONT  PRN IV SEE PROTOCOL 

Last administered on 3/26/20at 22:39;  Start 3/23/20 at 08:15;  Stop 3/28/20 at 

15:59;  Status DC


Etomidate (Amidate) 8 mg 1X  ONCE IV  Last administered on 3/23/20at 08:33;  

Start 3/23/20 at 08:30;  Stop 3/23/20 at 08:31;  Status DC


Succinylcholine Chloride (Anectine) 120 mg 1X  ONCE IV  Last administered on 

3/23/20at 08:34;  Start 3/23/20 at 08:30;  Stop 3/23/20 at 08:31;  Status DC


Midazolam HCl (Versed) 5 mg 1X  ONCE IV ;  Start 3/23/20 at 08:30;  Stop 3/23/20

at 08:31;  Status DC


Potassium Chloride 15 meq/ Bicarbonate Dialysis Soln w/ out KCl 5,007.5 ml  @ 

1,000 mls/ hr Q5H1M IV  Last administered on 3/24/20at 11:11;  Start 3/23/20 at 

12:00;  Stop 3/24/20 at 11:15;  Status DC


Potassium Chloride 15 meq/ Bicarbonate Dialysis Soln w/ out KCl 5,007.5 ml  @ 

1,000 mls/ hr Q5H1M IV  Last administered on 3/24/20at 11:12;  Start 3/23/20 at 

12:00;  Stop 3/24/20 at 11:17;  Status DC


Potassium Chloride 15 meq/ Bicarbonate Dialysis Soln w/ out KCl 5,007.5 ml  @ 

1,000 mls/ hr Q5H1M IV  Last administered on 3/24/20at 11:11;  Start 3/23/20 at 

12:00;  Stop 3/24/20 at 11:19;  Status DC


Sodium Chloride 90 meq/Potassium Chloride 15 meq/ Potassium Phosphate 10 mmol/ 

Magnesium Sulfate 10 meq/Calcium Gluconate 20 meq/ Multivitamins 10 ml/Chromium/

Copper/Manganese/ Seleni/Zn 0.5 ml/ Total Parenteral Nutrition/Amino 

Acids/Dextrose/ Fat Emulsion Intravenous 1,400 ml @  58.333 mls/ hr TPN  CONT IV

 Last administered on 3/23/20at 21:42;  Start 3/23/20 at 22:00;  Stop 3/24/20 at

21:59;  Status DC


Heparin Sodium (Porcine) (Heparin Sodium) 5,000 unit Q8HRS SQ  Last administered

on 3/28/20at 05:55;  Start 3/23/20 at 15:00;  Stop 3/28/20 at 13:28;  Status DC


Meropenem 500 mg/ Sodium Chloride 50 ml @  100 mls/hr Q6HRS IV  Last 

administered on 3/25/20at 06:00;  Start 3/24/20 at 09:00;  Stop 3/25/20 at 

07:29;  Status DC


Potassium Phosphate 20 mmol/ Sodium Chloride 106.6667 ml @  51.667 m... 1X  ONCE

IV  Last administered on 3/24/20at 11:22;  Start 3/24/20 at 10:15;  Stop 3/24/20

at 12:18;  Status DC


Acetaminophen (Tylenol Supp) 650 mg PRN Q6HRS  PRN MD MILD PAIN / TEMP > 100.3'F

Last administered on 6/10/20at 22:16;  Start 3/24/20 at 10:30


Potassium Chloride/Water 100 ml @  100 mls/hr Q1H IV  Last administered on 

3/24/20at 12:12;  Start 3/24/20 at 11:00;  Stop 3/24/20 at 12:59;  Status DC


Potassium Chloride 20 meq/ Bicarbonate Dialysis Soln w/ out KCl 5,010 ml @  

1,000 mls/hr Q5H1M IV  Last administered on 3/25/20at 08:48;  Start 3/24/20 at 

12:00;  Stop 3/25/20 at 13:03;  Status DC


Potassium Chloride 20 meq/ Bicarbonate Dialysis Soln w/ out KCl 5,010 ml @  

1,000 mls/hr Q5H1M IV  Last administered on 3/29/20at 14:52;  Start 3/24/20 at 

11:30;  Stop 3/29/20 at 19:59;  Status DC


Potassium Chloride 20 meq/ Bicarbonate Dialysis Soln w/ out KCl 5,010 ml @  

1,000 mls/hr Q5H1M IV  Last administered on 3/29/20at 14:53;  Start 3/24/20 at 

11:30;  Stop 3/29/20 at 19:59;  Status DC


Sodium Chloride 90 meq/Potassium Chloride 15 meq/ Potassium Phosphate 15 mmol/ 

Magnesium Sulfate 10 meq/Calcium Gluconate 15 meq/ Multivitamins 10 ml/Chromium/

Copper/Manganese/ Seleni/Zn 0.5 ml/ Total Parenteral Nutrition/Amino 

Acids/Dextrose/ Fat Emulsion Intravenous 1,400 ml @  58.333 mls/ hr TPN  CONT IV

 Last administered on 3/24/20at 22:17;  Start 3/24/20 at 22:00;  Stop 3/25/20 at

21:59;  Status DC


Cefepime HCl (Maxipime) 2 gm Q12HR IVP  Last administered on 4/7/20at 20:56;  

Start 3/25/20 at 09:00;  Stop 4/8/20 at 09:58;  Status DC


Daptomycin 500 mg/ Sodium Chloride 50 ml @  100 mls/hr Q48H IV  Last 

administered on 4/10/20at 09:57;  Start 3/25/20 at 08:30;  Stop 4/10/20 at 

10:07;  Status DC


Lidocaine HCl (Buffered Lidocaine 1%) 3 ml 1X  ONCE INJ  Last administered on 

3/25/20at 10:27;  Start 3/25/20 at 10:30;  Stop 3/25/20 at 10:31;  Status DC


Potassium Phosphate 20 mmol/ Sodium Chloride 106.6667 ml @  51.667 m... 1X  ONCE

IV  Last administered on 3/25/20at 12:51;  Start 3/25/20 at 13:00;  Stop 3/25/20

at 15:03;  Status DC


Sodium Chloride 90 meq/Potassium Chloride 15 meq/ Potassium Phosphate 18 mmol/ 

Magnesium Sulfate 8 meq/Calcium Gluconate 15 meq/ Multivitamins 10 ml/Chromium/ 

Copper/Manganese/ Seleni/Zn 0.5 ml/ Total Parenteral Nutrition/Amino 

Acids/Dextrose/ Fat Emulsion Intravenous 1,400 ml @  58.333 mls/ hr TPN  CONT IV

 Last administered on 3/25/20at 22:16;  Start 3/25/20 at 22:00;  Stop 3/26/20 at

21:59;  Status DC


Potassium Chloride 20 meq/ Bicarbonate Dialysis Soln w/ out KCl 5,010 ml @  

1,000 mls/hr Q5H1M IV  Last administered on 3/29/20at 14:54;  Start 3/25/20 at 

16:00;  Stop 3/29/20 at 19:59;  Status DC


Multi-Ingred Cream/Lotion/Oil/ Oint (Artificial Tears Eye Ointment) 1 thomas PRN 

Q1HR  PRN OU DRY EYE, 2nd choice Last administered on 4/13/20at 08:19;  Start 

3/25/20 at 17:30;  Stop 6/3/20 at 14:39;  Status DC


Sodium Chloride 90 meq/Potassium Chloride 15 meq/ Potassium Phosphate 18 mmol/ 

Magnesium Sulfate 8 meq/Calcium Gluconate 15 meq/ Multivitamins 10 ml/Chromium/ 

Copper/Manganese/ Seleni/Zn 0.5 ml/ Total Parenteral Nutrition/Amino 

Acids/Dextrose/ Fat Emulsion Intravenous 1,400 ml @  58.333 mls/ hr TPN  CONT IV

 Last administered on 3/26/20at 22:00;  Start 3/26/20 at 22:00;  Stop 3/27/20 at

21:59;  Status DC


Albumin Human 500 ml @  125 mls/hr 1X  ONCE IV ;  Start 3/26/20 at 14:15;  Stop 

3/26/20 at 18:14;  Status DC


Sodium Chloride 90 meq/Potassium Chloride 15 meq/ Potassium Phosphate 18 mmol/ 

Magnesium Sulfate 8 meq/Calcium Gluconate 15 meq/ Multivitamins 10 ml/Chromium/ 

Copper/Manganese/ Seleni/Zn 0.5 ml/ Insulin Human Regular 10 unit/ Total 

Parenteral Nutrition/Amino Acids/Dextrose/ Fat Emulsion Intravenous 1,400 ml @  

58.333 mls/ hr TPN  CONT IV  Last administered on 3/27/20at 21:43;  Start 

3/27/20 at 22:00;  Stop 3/28/20 at 21:59;  Status DC


Lidocaine HCl (Buffered Lidocaine 1%) 3 ml STK-MED ONCE .ROUTE ;  Start 3/25/20 

at 10:00;  Stop 3/27/20 at 13:57;  Status DC


Midazolam HCl 100 mg/Sodium Chloride 100 ml @ 7 mls/hr CONT  PRN IV SEE PROTOCOL

Last administered on 4/8/20at 15:35;  Start 3/28/20 at 16:00;  Stop 6/3/20 at 

14:38;  Status DC


Sodium Chloride 90 meq/Potassium Chloride 15 meq/ Potassium Phosphate 18 mmol/ 

Magnesium Sulfate 8 meq/Calcium Gluconate 15 meq/ Multivitamins 10 ml/Chromium/ 

Copper/Manganese/ Seleni/Zn 0.5 ml/ Insulin Human Regular 15 unit/ Total 

Parenteral Nutrition/Amino Acids/Dextrose/ Fat Emulsion Intravenous 1,400 ml @  

58.333 mls/ hr TPN  CONT IV  Last administered on 3/28/20at 20:34;  Start 

3/28/20 at 22:00;  Stop 3/29/20 at 21:59;  Status DC


Info ([facility] Electrolyte Protocol) 1 ea CONT PRN  PRN MC PER PROTOCOL;  Start 

3/29/20 at 13:15


Sodium Chloride 90 meq/Potassium Chloride 15 meq/ Potassium Phosphate 18 mmol/ 

Magnesium Sulfate 8 meq/Calcium Gluconate 15 meq/ Multivitamins 10 ml/Chromium/ 

Copper/Manganese/ Seleni/Zn 0.5 ml/ Insulin Human Regular 15 unit/ Total 

Parenteral Nutrition/Amino Acids/Dextrose/ Fat Emulsion Intravenous 1,400 ml @  

58.333 mls/ hr TPN  CONT IV  Last administered on 3/29/20at 22:05;  Start 

3/29/20 at 22:00;  Stop 3/30/20 at 21:59;  Status DC


Potassium Chloride 15 meq/ Bicarbonate Dialysis Soln w/ out KCl 5,007.5 ml  @ 

1,000 mls/ hr Q5H1M IV  Last administered on 4/1/20at 18:14;  Start 3/29/20 at 

20:00;  Stop 4/2/20 at 13:08;  Status DC


Potassium Chloride 15 meq/ Bicarbonate Dialysis Soln w/ out KCl 5,007.5 ml  @ 

1,000 mls/ hr Q5H1M IV  Last administered on 4/1/20at 18:14;  Start 3/29/20 at 

20:00;  Stop 4/2/20 at 13:08;  Status DC


Potassium Chloride 15 meq/ Bicarbonate Dialysis Soln w/ out KCl 5,007.5 ml  @ 

1,000 mls/ hr Q5H1M IV  Last administered on 4/1/20at 18:14;  Start 3/29/20 at 

20:00;  Stop 4/2/20 at 13:08;  Status DC


Iohexol (Omnipaque 240 Mg/ml) 30 ml 1X  ONCE PO  Last administered on 3/30/20at 

11:30;  Start 3/30/20 at 11:30;  Stop 3/30/20 at 11:33;  Status DC


Info (CONTRAST GIVEN -- Rx MONITORING) 1 each PRN DAILY  PRN MC SEE COMMENTS;  

Start 3/30/20 at 11:45;  Stop 4/1/20 at 11:44;  Status DC


Sodium Chloride 90 meq/Potassium Chloride 15 meq/ Potassium Phosphate 18 mmol/ 

Magnesium Sulfate 8 meq/Calcium Gluconate 15 meq/ Multivitamins 10 ml/Chromium/ 

Copper/Manganese/ Seleni/Zn 0.5 ml/ Insulin Human Regular 15 unit/ Total 

Parenteral Nutrition/Amino Acids/Dextrose/ Fat Emulsion Intravenous 1,400 ml @  

58.333 mls/ hr TPN  CONT IV  Last administered on 3/30/20at 21:47;  Start 

3/30/20 at 22:00;  Stop 3/31/20 at 21:59;  Status DC


Sodium Chloride 90 meq/Potassium Chloride 15 meq/ Potassium Phosphate 18 mmol/ 

Magnesium Sulfate 8 meq/Calcium Gluconate 15 meq/ Multivitamins 10 ml/Chromium/ 

Copper/Manganese/ Seleni/Zn 0.5 ml/ Insulin Human Regular 20 unit/ Total Par

enteral Nutrition/Amino Acids/Dextrose/ Fat Emulsion Intravenous 1,400 ml @  

58.333 mls/ hr TPN  CONT IV  Last administered on 3/31/20at 21:36;  Start 

3/31/20 at 22:00;  Stop 4/1/20 at 21:59;  Status DC


Alteplase, Recombinant (Cathflo For Central Catheter Clearance) 1 mg 1X  ONCE 

INT CAT  Last administered on 3/31/20at 20:03;  Start 3/31/20 at 19:30;  Stop 

3/31/20 at 19:46;  Status DC


Alteplase, Recombinant (Cathflo For Central Catheter Clearance) 1 mg 1X  ONCE 

INT CAT  Last administered on 3/31/20at 22:05;  Start 3/31/20 at 22:00;  Stop 

3/31/20 at 22:01;  Status DC


Sodium Chloride 90 meq/Potassium Chloride 15 meq/ Potassium Phosphate 18 mmol/ 

Magnesium Sulfate 8 meq/Calcium Gluconate 15 meq/ Multivitamins 10 ml/Chromium/ 

Copper/Manganese/ Seleni/Zn 0.5 ml/ Insulin Human Regular 20 unit/ Total 

Parenteral Nutrition/Amino Acids/Dextrose/ Fat Emulsion Intravenous 1,400 ml @  

58.333 mls/ hr TPN  CONT IV  Last administered on 4/1/20at 21:30;  Start 4/1/20 

at 22:00;  Stop 4/2/20 at 21:59;  Status DC


Dexmedetomidine HCl 400 mcg/ Sodium Chloride 100 ml @ 0 mls/hr CONT  PRN IV 

ANXIETY / AGITATION Last administered on 5/30/20at 12:57;  Start 4/2/20 at 

08:15;  Stop 5/30/20 at 18:31;  Status DC


Sodium Chloride 500 ml @  500 mls/hr 1X PRN  PRN IV ELEVATED BP, SEE COMMENTS;  

Start 4/2/20 at 08:15


Atropine Sulfate (ATROPINE 0.5mg SYRINGE) 0.5 mg PRN Q5MIN  PRN IV SEE COMMENTS;

 Start 4/2/20 at 08:15


Furosemide (Lasix) 20 mg 1X  ONCE IVP  Last administered on 4/2/20at 08:19;  

Start 4/2/20 at 08:15;  Stop 4/2/20 at 08:16;  Status DC


Lidocaine HCl (Buffered Lidocaine 1%) 3 ml STK-MED ONCE .ROUTE ;  Start 4/2/20 

at 08:39;  Stop 4/2/20 at 08:39;  Status DC


Lidocaine HCl (Buffered Lidocaine 1%) 6 ml 1X  ONCE INJ  Last administered on 

4/2/20at 09:05;  Start 4/2/20 at 09:00;  Stop 4/2/20 at 09:06;  Status DC


Sodium Chloride 90 meq/Potassium Chloride 15 meq/ Potassium Phosphate 18 mmol/ 

Magnesium Sulfate 8 meq/Calcium Gluconate 15 meq/ Multivitamins 10 ml/Chromium/ 

Copper/Manganese/ Seleni/Zn 0.5 ml/ Insulin Human Regular 20 unit/ Total 

Parenteral Nutrition/Amino Acids/Dextrose/ Fat Emulsion Intravenous 1,400 ml @  

58.333 mls/ hr TPN  CONT IV  Last administered on 4/2/20at 22:45;  Start 4/2/20 

at 22:00;  Stop 4/3/20 at 21:59;  Status DC


Sodium Chloride 1,000 ml @  1,000 mls/hr Q1H PRN IV hypotension;  Start 4/3/20 

at 07:30;  Stop 4/3/20 at 13:29;  Status DC


Albumin Human 200 ml @  200 mls/hr 1X PRN  PRN IV Hypotension Last administered 

on 4/3/20at 09:36;  Start 4/3/20 at 07:30;  Stop 4/3/20 at 13:29;  Status DC


Sodium Chloride (Normal Saline Flush) 10 ml 1X PRN  PRN IV AP catheter pack;  

Start 4/3/20 at 07:30;  Stop 4/3/20 at 21:29;  Status DC


Sodium Chloride (Normal Saline Flush) 10 ml 1X PRN  PRN IV  catheter pack;  

Start 4/3/20 at 07:30;  Stop 4/4/20 at 07:29;  Status DC


Sodium Chloride 1,000 ml @  400 mls/hr Q2H30M PRN IV PATENCY;  Start 4/3/20 at 

07:30;  Stop 4/3/20 at 19:29;  Status DC


Info (PHARMACY MONITORING -- do not chart) 1 each PRN DAILY  PRN MC SEE 

COMMENTS;  Start 4/3/20 at 07:30;  Stop 4/3/20 at 13:02;  Status DC


Info (PHARMACY MONITORING -- do not chart) 1 each PRN DAILY  PRN MC SEE 

COMMENTS;  Start 4/3/20 at 07:30;  Stop 4/5/20 at 12:45;  Status DC


Sodium Chloride 90 meq/Potassium Chloride 15 meq/ Potassium Phosphate 10 mmol/ 

Magnesium Sulfate 8 meq/Calcium Gluconate 15 meq/ Multivitamins 10 ml/Chromium/ 

Copper/Manganese/ Seleni/Zn 0.5 ml/ Insulin Human Regular 25 unit/ Total 

Parenteral Nutrition/Amino Acids/Dextrose/ Fat Emulsion Intravenous 1,400 ml @  

58.333 mls/ hr TPN  CONT IV  Last administered on 4/3/20at 22:19;  Start 4/3/20 

at 22:00;  Stop 4/4/20 at 21:59;  Status DC


Heparin Sodium (Porcine) (Heparin Sodium) 5,000 unit Q12HR SQ  Last administered

on 4/26/20at 08:59;  Start 4/3/20 at 21:00;  Stop 4/26/20 at 10:05;  Status DC


Ondansetron HCl (Zofran) 4 mg PRN Q6HRS  PRN IV NAUSEA/VOMITING;  Start 4/6/20 

at 07:00;  Stop 4/7/20 at 06:59;  Status DC


Fentanyl Citrate (Fentanyl 2ml Vial) 25 mcg PRN Q5MIN  PRN IV MILD PAIN 1-3;  

Start 4/6/20 at 07:00;  Stop 4/7/20 at 06:59;  Status DC


Fentanyl Citrate (Fentanyl 2ml Vial) 50 mcg PRN Q5MIN  PRN IV MODERATE TO SEVERE

PAIN;  Start 4/6/20 at 07:00;  Stop 4/7/20 at 06:59;  Status DC


Ringer's Solution 1,000 ml @  30 mls/hr Q24H IV ;  Start 4/6/20 at 07:00;  Stop 

4/6/20 at 18:59;  Status DC


Lidocaine HCl (Xylocaine-Mpf 1% 2ml Vial) 2 ml PRN 1X  PRN ID PRIOR TO IV START;

 Start 4/6/20 at 07:00;  Stop 4/7/20 at 06:59;  Status DC


Prochlorperazine Edisylate (Compazine) 5 mg PACU PRN  PRN IV NAUSEA, MRX1;  

Start 4/6/20 at 07:00;  Stop 4/7/20 at 06:59;  Status DC


Sodium Chloride 1,000 ml @  1,000 mls/hr Q1H PRN IV hypotension;  Start 4/4/20 

at 09:10;  Stop 4/4/20 at 15:09;  Status DC


Albumin Human 200 ml @  200 mls/hr 1X PRN  PRN IV Hypotension Last administered 

on 4/4/20at 10:10;  Start 4/4/20 at 09:15;  Stop 4/4/20 at 15:14;  Status DC


Sodium Chloride 1,000 ml @  400 mls/hr Q2H30M PRN IV PATENCY;  Start 4/4/20 at 

09:10;  Stop 4/4/20 at 21:09;  Status DC


Info (PHARMACY MONITORING -- do not chart) 1 each PRN DAILY  PRN MC SEE 

COMMENTS;  Start 4/4/20 at 09:15;  Stop 4/5/20 at 12:45;  Status DC


Info (PHARMACY MONITORING -- do not chart) 1 each PRN DAILY  PRN MC SEE 

COMMENTS;  Start 4/4/20 at 09:15;  Stop 4/5/20 at 12:45;  Status DC


Sodium Chloride 90 meq/Potassium Chloride 15 meq/ Potassium Phosphate 10 mmol/ 

Magnesium Sulfate 8 meq/Calcium Gluconate 15 meq/ Multivitamins 10 ml/Chromium/ 

Copper/Manganese/ Seleni/Zn 0.5 ml/ Insulin Human Regular 25 unit/ Total 

Parenteral Nutrition/Amino Acids/Dextrose/ Fat Emulsion Intravenous 1,400 ml @  

58.333 mls/ hr TPN  CONT IV  Last administered on 4/4/20at 22:10;  Start 4/4/20 

at 22:00;  Stop 4/5/20 at 21:59;  Status DC


Magnesium Sulfate 50 ml @ 25 mls/hr PRN DAILY  PRN IV for Mag < 1.7 on am labs 

Last administered on 4/20/20at 17:27;  Start 4/5/20 at 09:15


Sodium Chloride 90 meq/Potassium Chloride 15 meq/ Potassium Phosphate 10 mmol/ 

Magnesium Sulfate 8 meq/Calcium Gluconate 15 meq/ Multivitamins 10 ml/Chromium/ 

Copper/Manganese/ Seleni/Zn 0.5 ml/ Insulin Human Regular 25 unit/ Total 

Parenteral Nutrition/Amino Acids/Dextrose/ Fat Emulsion Intravenous 1,400 ml @  

58.333 mls/ hr TPN  CONT IV  Last administered on 4/5/20at 21:20;  Start 4/5/20 

at 22:00;  Stop 4/6/20 at 21:59;  Status DC


Sodium Chloride 1,000 ml @  1,000 mls/hr Q1H PRN IV hypotension;  Start 4/5/20 

at 12:23;  Stop 4/5/20 at 18:22;  Status DC


Albumin Human 200 ml @  200 mls/hr 1X  ONCE IV  Last administered on 4/5/20at 

13:34;  Start 4/5/20 at 12:30;  Stop 4/5/20 at 13:29;  Status DC


Diphenhydramine HCl (Benadryl) 25 mg 1X PRN  PRN IV ITCHING;  Start 4/5/20 at 

12:30;  Stop 4/6/20 at 12:29;  Status DC


Diphenhydramine HCl (Benadryl) 25 mg 1X PRN  PRN IV ITCHING;  Start 4/5/20 at 

12:30;  Stop 4/6/20 at 12:29;  Status DC


Info (PHARMACY MONITORING -- do not chart) 1 each PRN DAILY  PRN MC SEE 

COMMENTS;  Start 4/5/20 at 12:30;  Status Cancel


Bupivacaine HCl/ Epinephrine Bitart (Sensorcain-Epi 0.5%-1:438861 Mpf) 30 ml 

STK-MED ONCE .ROUTE  Last administered on 4/6/20at 11:44;  Start 4/6/20 at 

11:00;  Stop 4/6/20 at 11:01;  Status DC


Cellulose (Surgicel Fibrillar 1x2) 1 each STK-MED ONCE .ROUTE ;  Start 4/6/20 at

11:00;  Stop 4/6/20 at 11:01;  Status DC


Sodium Chloride 90 meq/Potassium Chloride 15 meq/ Potassium Phosphate 10 mmol/ 

Magnesium Sulfate 12 meq/Calcium Gluconate 15 meq/ Multivitamins 10 ml/Chromium/

Copper/Manganese/ Seleni/Zn 0.5 ml/ Insulin Human Regular 25 unit/ Total 

Parenteral Nutrition/Amino Acids/Dextrose/ Fat Emulsion Intravenous 1,400 ml @  

58.333 mls/ hr TPN  CONT IV  Last administered on 4/6/20at 22:24;  Start 4/6/20 

at 22:00;  Stop 4/7/20 at 21:59;  Status DC


Propofol 20 ml @ As Directed STK-MED ONCE IV ;  Start 4/6/20 at 11:07;  Stop 

4/6/20 at 11:07;  Status DC


Cellulose (Surgicel Hemostat 4x8) 1 each STK-MED ONCE .ROUTE  Last administered 

on 4/6/20at 11:44;  Start 4/6/20 at 11:55;  Stop 4/6/20 at 11:56;  Status DC


Sevoflurane (Ultane) 60 ml STK-MED ONCE IH ;  Start 4/6/20 at 12:46;  Stop 

4/6/20 at 12:46;  Status DC


Sodium Chloride 1,000 ml @  1,000 mls/hr Q1H PRN IV hypotension;  Start 4/6/20 

at 13:51;  Stop 4/6/20 at 19:50;  Status DC


Albumin Human 200 ml @  200 mls/hr 1X PRN  PRN IV Hypotension Last administered 

on 4/6/20at 14:51;  Start 4/6/20 at 14:00;  Stop 4/6/20 at 19:59;  Status DC


Diphenhydramine HCl (Benadryl) 25 mg 1X PRN  PRN IV ITCHING;  Start 4/6/20 at 

14:00;  Stop 4/7/20 at 13:59;  Status DC


Diphenhydramine HCl (Benadryl) 25 mg 1X PRN  PRN IV ITCHING;  Start 4/6/20 at 

14:00;  Stop 4/7/20 at 13:59;  Status DC


Sodium Chloride 1,000 ml @  400 mls/hr Q2H30M PRN IV PATENCY;  Start 4/6/20 at 

13:51;  Stop 4/7/20 at 01:50;  Status DC


Info (PHARMACY MONITORING -- do not chart) 1 each PRN DAILY  PRN MC SEE 

COMMENTS;  Start 4/6/20 at 14:00;  Stop 4/9/20 at 08:16;  Status DC


Heparin Sodium (Porcine) (Hep Lock Adult) 500 unit STK-MED ONCE IVP ;  Start 

4/7/20 at 09:29;  Stop 4/7/20 at 09:30;  Status DC


Sodium Chloride 1,000 ml @  1,000 mls/hr Q1H PRN IV hypotension;  Start 4/7/20 

at 10:43;  Stop 4/7/20 at 16:42;  Status DC


Sodium Chloride 1,000 ml @  400 mls/hr Q2H30M PRN IV PATENCY;  Start 4/7/20 at 

10:43;  Stop 4/7/20 at 22:42;  Status DC


Info (PHARMACY MONITORING -- do not chart) 1 each PRN DAILY  PRN MC SEE 

COMMENTS;  Start 4/7/20 at 10:45;  Status UNV


Info (PHARMACY MONITORING -- do not chart) 1 each PRN DAILY  PRN MC SEE 

COMMENTS;  Start 4/7/20 at 10:45;  Status UNV


Sodium Chloride 90 meq/Potassium Chloride 15 meq/ Magnesium Sulfate 12 

meq/Calcium Gluconate 15 meq/ Multivitamins 10 ml/Chromium/ Copper/Manganese/ 

Seleni/Zn 0.5 ml/ Insulin Human Regular 25 unit/ Total Parenteral 

Nutrition/Amino Acids/Dextrose/ Fat Emulsion Intravenous 1,400 ml @  58.333 mls/

hr TPN  CONT IV  Last administered on 4/7/20at 22:13;  Start 4/7/20 at 22:00;  

Stop 4/8/20 at 21:59;  Status DC


Sodium Chloride 1,000 ml @  1,000 mls/hr Q1H PRN IV hypotension;  Start 4/8/20 

at 07:50;  Stop 4/8/20 at 13:49;  Status DC


Albumin Human 200 ml @  200 mls/hr 1X  ONCE IV ;  Start 4/8/20 at 08:00;  Stop 

4/8/20 at 08:53;  Status DC


Diphenhydramine HCl (Benadryl) 25 mg 1X PRN  PRN IV ITCHING;  Start 4/8/20 at 

08:00;  Stop 4/9/20 at 07:59;  Status DC


Diphenhydramine HCl (Benadryl) 25 mg 1X PRN  PRN IV ITCHING;  Start 4/8/20 at 

08:00;  Stop 4/9/20 at 07:59;  Status DC


Info (PHARMACY MONITORING -- do not chart) 1 each PRN DAILY  PRN MC SEE 

COMMENTS;  Start 4/8/20 at 08:00;  Stop 4/9/20 at 08:16;  Status DC


Albumin Human 50 ml @ 50 mls/hr 1X  ONCE IV ;  Start 4/8/20 at 08:53;  Stop 4/8 /20 at 08:56;  Status DC


Albumin Human 200 ml @  50 mls/hr PRN 1X  PRN IV HYPOTENSION Last administered 

on 4/14/20at 11:54;  Start 4/8/20 at 09:00;  Stop 5/21/20 at 11:14;  Status DC


Meropenem 500 mg/ Sodium Chloride 50 ml @  100 mls/hr Q12H IV  Last administered

on 4/28/20at 10:45;  Start 4/8/20 at 10:00;  Stop 4/28/20 at 12:37;  Status DC


Sodium Chloride 90 meq/Magnesium Sulfate 12 meq/ Calcium Gluconate 15 meq/ 

Multivitamins 10 ml/Chromium/ Copper/Manganese/ Seleni/Zn 0.5 ml/ Insulin Human 

Regular 25 unit/ Total Parenteral Nutrition/Amino Acids/Dextrose/ Fat Emulsion 

Intravenous 1,400 ml @  58.333 mls/ hr TPN  CONT IV  Last administered on 

4/8/20at 21:41;  Start 4/8/20 at 22:00;  Stop 4/9/20 at 21:59;  Status DC


Sodium Chloride 1,000 ml @  1,000 mls/hr Q1H PRN IV hypotension;  Start 4/9/20 

at 07:58;  Stop 4/9/20 at 13:57;  Status DC


Albumin Human 200 ml @  200 mls/hr 1X PRN  PRN IV Hypotension Last administered 

on 4/9/20at 09:30;  Start 4/9/20 at 08:00;  Stop 4/9/20 at 13:59;  Status DC


Sodium Chloride 1,000 ml @  400 mls/hr Q2H30M PRN IV PATENCY;  Start 4/9/20 at 

07:58;  Stop 4/9/20 at 19:57;  Status DC


Info (PHARMACY MONITORING -- do not chart) 1 each PRN DAILY  PRN MC SEE 

COMMENTS;  Start 4/9/20 at 08:00;  Status Cancel


Info (PHARMACY MONITORING -- do not chart) 1 each PRN DAILY  PRN MC SEE 

COMMENTS;  Start 4/9/20 at 08:15;  Status UNV


Sodium Chloride 90 meq/Potassium Phosphate 5 mmol/ Magnesium Sulfate 12 

meq/Calcium Gluconate 15 meq/ Multivitamins 10 ml/Chromium/ Copper/Manganese/ 

Seleni/Zn 0.5 ml/ Insulin Human Regular 30 unit/ Total Parenteral 

Nutrition/Amino Acids/Dextrose/ Fat Emulsion Intravenous 1,400 ml @  58.333 mls/

hr TPN  CONT IV  Last administered on 4/9/20at 22:08;  Start 4/9/20 at 22:00;  

Stop 4/10/20 at 21:59;  Status DC


Linezolid/Dextrose 300 ml @  300 mls/hr Q12HR IV  Last administered on 4/20/20at

20:40;  Start 4/10/20 at 11:00;  Stop 4/21/20 at 08:10;  Status DC


Sodium Chloride 90 meq/Potassium Phosphate 15 mmol/ Magnesium Sulfate 12 

meq/Calcium Gluconate 15 meq/ Multivitamins 10 ml/Chromium/ Copper/Manganese/ 

Seleni/Zn 0.5 ml/ Insulin Human Regular 30 unit/ Total Parenteral 

Nutrition/Amino Acids/Dextrose/ Fat Emulsion Intravenous 1,400 ml @  58.333 mls/

hr TPN  CONT IV  Last administered on 4/10/20at 21:49;  Start 4/10/20 at 22:00; 

Stop 4/11/20 at 21:59;  Status DC


Sodium Chloride 90 meq/Potassium Phosphate 15 mmol/ Magnesium Sulfate 12 

meq/Calcium Gluconate 15 meq/ Multivitamins 10 ml/Chromium/ Copper/Manganese/ 

Seleni/Zn 0.5 ml/ Insulin Human Regular 40 unit/ Total Parenteral Nutritio

n/Amino Acids/Dextrose/ Fat Emulsion Intravenous 1,400 ml @  58.333 mls/ hr TPN 

CONT IV  Last administered on 4/11/20at 21:21;  Start 4/11/20 at 22:00;  Stop 

4/12/20 at 21:59;  Status DC


Sodium Chloride 1,000 ml @  1,000 mls/hr Q1H PRN IV hypotension;  Start 4/11/20 

at 13:26;  Stop 4/11/20 at 19:25;  Status DC


Albumin Human 200 ml @  200 mls/hr 1X PRN  PRN IV Hypotension Last administered 

on 4/11/20at 15:00;  Start 4/11/20 at 13:30;  Stop 4/11/20 at 19:29;  Status DC


Sodium Chloride (Normal Saline Flush) 10 ml 1X PRN  PRN IV AP catheter pack;  

Start 4/11/20 at 13:30;  Stop 4/12/20 at 13:29;  Status DC


Sodium Chloride (Normal Saline Flush) 10 ml 1X PRN  PRN IV  catheter pack;  

Start 4/11/20 at 13:30;  Stop 4/12/20 at 13:29;  Status DC


Sodium Chloride 1,000 ml @  400 mls/hr Q2H30M PRN IV PATENCY;  Start 4/11/20 at 

13:26;  Stop 4/12/20 at 01:25;  Status DC


Info (PHARMACY MONITORING -- do not chart) 1 each PRN DAILY  PRN MC SEE 

COMMENTS;  Start 4/11/20 at 13:30;  Stop 4/11/20 at 13:33;  Status DC


Info (PHARMACY MONITORING -- do not chart) 1 each PRN DAILY  PRN MC SEE 

COMMENTS;  Start 4/11/20 at 13:30;  Stop 4/11/20 at 13:34;  Status DC


Sodium Chloride 90 meq/Potassium Phosphate 19 mmol/ Magnesium Sulfate 12 

meq/Calcium Gluconate 15 meq/ Multivitamins 10 ml/Chromium/ Copper/Manganese/ 

Seleni/Zn 0.5 ml/ Insulin Human Regular 40 unit/ Total Parenteral 

Nutrition/Amino Acids/Dextrose/ Fat Emulsion Intravenous 1,400 ml @  58.333 mls/

hr TPN  CONT IV  Last administered on 4/12/20at 21:54;  Start 4/12/20 at 22:00; 

Stop 4/13/20 at 21:59;  Status DC


Sodium Chloride 1,000 ml @  1,000 mls/hr Q1H PRN IV hypotension;  Start 4/13/20 

at 09:35;  Stop 4/13/20 at 15:34;  Status DC


Albumin Human 200 ml @  200 mls/hr 1X PRN  PRN IV Hypotension;  Start 4/13/20 at

09:45;  Stop 4/13/20 at 15:44;  Status DC


Diphenhydramine HCl (Benadryl) 25 mg 1X PRN  PRN IV ITCHING;  Start 4/13/20 at 

09:45;  Stop 4/14/20 at 09:44;  Status DC


Diphenhydramine HCl (Benadryl) 25 mg 1X PRN  PRN IV ITCHING;  Start 4/13/20 at 

09:45;  Stop 4/14/20 at 09:44;  Status DC


Sodium Chloride 1,000 ml @  400 mls/hr Q2H30M PRN IV PATENCY;  Start 4/13/20 at 

09:35;  Stop 4/13/20 at 21:34;  Status DC


Info (PHARMACY MONITORING -- do not chart) 1 each PRN DAILY  PRN MC SEE 

COMMENTS;  Start 4/13/20 at 09:45;  Status Cancel


Sodium Chloride 100 meq/Potassium Phosphate 19 mmol/ Magnesium Sulfate 12 

meq/Calcium Gluconate 15 meq/ Multivitamins 10 ml/Chromium/ Copper/Manganese/ 

Seleni/Zn 0.5 ml/ Insulin Human Regular 40 unit/ Potassium Chloride 20 meq/ 

Total Parenteral Nutrition/Amino Acids/Dextrose/ Fat Emulsion Intravenous 1,400 

ml @  58.333 mls/ hr TPN  CONT IV  Last administered on 4/13/20at 22:02;  Start 

4/13/20 at 22:00;  Stop 4/14/20 at 21:59;  Status DC


Furosemide (Lasix) 40 mg 1X  ONCE IVP  Last administered on 4/13/20at 14:39;  

Start 4/13/20 at 14:30;  Stop 4/13/20 at 14:31;  Status DC


Metronidazole 100 ml @  100 mls/hr Q8HRS IV  Last administered on 4/21/20at 

06:04;  Start 4/14/20 at 10:00;  Stop 4/21/20 at 08:10;  Status DC


Sodium Chloride 1,000 ml @  1,000 mls/hr Q1H PRN IV hypotension;  Start 4/14/20 

at 08:00;  Stop 4/14/20 at 13:59;  Status DC


Albumin Human 200 ml @  200 mls/hr 1X PRN  PRN IV Hypotension;  Start 4/14/20 at

08:00;  Stop 4/14/20 at 13:59;  Status DC


Sodium Chloride 1,000 ml @  400 mls/hr Q2H30M PRN IV PATENCY;  Start 4/14/20 at 

08:00;  Stop 4/14/20 at 19:59;  Status DC


Info (PHARMACY MONITORING -- do not chart) 1 each PRN DAILY  PRN MC SEE 

COMMENTS;  Start 4/14/20 at 11:30;  Status UNV


Info (PHARMACY MONITORING -- do not chart) 1 each PRN DAILY  PRN MC SEE CO

MMENTS;  Start 4/14/20 at 11:30;  Stop 4/16/20 at 12:13;  Status DC


Sodium Chloride 100 meq/Potassium Phosphate 19 mmol/ Magnesium Sulfate 12 

meq/Calcium Gluconate 15 meq/ Multivitamins 10 ml/Chromium/ Copper/Manganese/ 

Seleni/Zn 0.5 ml/ Insulin Human Regular 40 unit/ Potassium Chloride 20 meq/ 

Total Parenteral Nutrition/Amino Acids/Dextrose/ Fat Emulsion Intravenous 1,400 

ml @  58.333 mls/ hr TPN  CONT IV  Last administered on 4/14/20at 21:52;  Start 

4/14/20 at 22:00;  Stop 4/15/20 at 21:59;  Status DC


Sodium Chloride (Normal Saline Flush) 10 ml QSHIFT  PRN IV AFTER MEDS AND BLOOD 

DRAWS;  Start 4/14/20 at 15:00;  Stop 5/12/20 at 11:27;  Status DC


Sodium Chloride (Normal Saline Flush) 10 ml PRN Q5MIN  PRN IV AFTER MEDS AND 

BLOOD DRAWS;  Start 4/14/20 at 15:00


Sodium Chloride (Normal Saline Flush) 20 ml PRN Q5MIN  PRN IV AFTER MEDS AND 

BLOOD DRAWS;  Start 4/14/20 at 15:00


Sodium Chloride 100 meq/Potassium Phosphate 19 mmol/ Magnesium Sulfate 12 

meq/Calcium Gluconate 15 meq/ Multivitamins 10 ml/Chromium/ Copper/Manganese/ 

Seleni/Zn 0.5 ml/ Insulin Human Regular 40 unit/ Potassium Chloride 20 meq/ 

Total Parenteral Nutrition/Amino Acids/Dextrose/ Fat Emulsion Intravenous 1,400 

ml @  58.333 mls/ hr TPN  CONT IV  Last administered on 4/15/20at 21:20;  Start 

4/15/20 at 22:00;  Stop 4/16/20 at 21:59;  Status DC


Lidocaine HCl (Buffered Lidocaine 1%) 3 ml STK-MED ONCE .ROUTE ;  Start 4/15/20 

at 13:16;  Stop 4/15/20 at 13:16;  Status DC


Lidocaine HCl (Buffered Lidocaine 1%) 6 ml 1X  ONCE INJ  Last administered on 

4/15/20at 13:45;  Start 4/15/20 at 13:30;  Stop 4/15/20 at 13:31;  Status DC


Albumin Human 100 ml @  100 mls/hr 1X  ONCE IV  Last administered on 4/15/20at 

15:41;  Start 4/15/20 at 15:00;  Stop 4/15/20 at 15:59;  Status DC


Albumin Human 50 ml @ 50 mls/hr 1X  ONCE IV  Last administered on 4/15/20at 

15:00;  Start 4/15/20 at 15:00;  Stop 4/15/20 at 15:59;  Status DC


Info (PHARMACY MONITORING -- do not chart) 1 each PRN DAILY  PRN MC SEE 

COMMENTS;  Start 4/16/20 at 11:30;  Status Cancel


Info (PHARMACY MONITORING -- do not chart) 1 each PRN DAILY  PRN MC SEE 

COMMENTS;  Start 4/16/20 at 11:30;  Status UNV


Sodium Chloride 100 meq/Potassium Phosphate 10 mmol/ Magnesium Sulfate 12 

meq/Calcium Gluconate 15 meq/ Multivitamins 10 ml/Chromium/ Copper/Manganese/ 

Seleni/Zn 0.5 ml/ Insulin Human Regular 35 unit/ Potassium Chloride 20 meq/ 

Total Parenteral Nutrition/Amino Acids/Dextrose/ Fat Emulsion Intravenous 1,400 

ml @  58.333 mls/ hr TPN  CONT IV  Last administered on 4/16/20at 22:10;  Start 

4/16/20 at 22:00;  Stop 4/17/20 at 21:59;  Status DC


Sodium Chloride 100 meq/Potassium Phosphate 5 mmol/ Magnesium Sulfate 12 

meq/Calcium Gluconate 15 meq/ Multivitamins 10 ml/Chromium/ Copper/Manganese/ 

Seleni/Zn 0.5 ml/ Insulin Human Regular 35 unit/ Potassium Chloride 20 meq/ 

Total Parenteral Nutrition/Amino Acids/Dextrose/ Fat Emulsion Intravenous 1,400 

ml @  58.333 mls/ hr TPN  CONT IV  Last administered on 4/17/20at 22:59;  Start 

4/17/20 at 22:00;  Stop 4/18/20 at 21:59;  Status DC


Sodium Chloride 1,000 ml @  1,000 mls/hr Q1H PRN IV hypotension;  Start 4/18/20 

at 08:27;  Stop 4/18/20 at 14:26;  Status DC


Albumin Human 200 ml @  200 mls/hr 1X PRN  PRN IV Hypotension Last administered 

on 4/18/20at 09:18;  Start 4/18/20 at 08:30;  Stop 4/18/20 at 14:29;  Status DC


Sodium Chloride 1,000 ml @  400 mls/hr Q2H30M PRN IV PATENCY;  Start 4/18/20 at 

08:27;  Stop 4/18/20 at 20:26;  Status DC


Info (PHARMACY MONITORING -- do not chart) 1 each PRN DAILY  PRN MC SEE 

COMMENTS;  Start 4/18/20 at 08:30;  Status Cancel


Info (PHARMACY MONITORING -- do not chart) 1 each PRN DAILY  PRN MC SEE 

COMMENTS;  Start 4/18/20 at 08:30;  Stop 4/26/20 at 13:10;  Status DC


Sodium Chloride 100 meq/Potassium Chloride 40 meq/ Magnesium Sulfate 15 

meq/Calcium Gluconate 15 meq/ Multivitamins 10 ml/Chromium/ Copper/Manganese/ 

Seleni/Zn 0.5 ml/ Insulin Human Regular 35 unit/ Total Parenteral 

Nutrition/Amino Acids/Dextrose/ Fat Emulsion Intravenous 1,400 ml @  58.333 mls/

hr TPN  CONT IV  Last administered on 4/18/20at 22:00;  Start 4/18/20 at 22:00; 

Stop 4/19/20 at 21:59;  Status DC


Potassium Chloride/Water 100 ml @  100 mls/hr 1X  ONCE IV  Last administered on 

4/18/20at 17:28;  Start 4/18/20 at 14:45;  Stop 4/18/20 at 15:44;  Status DC


Sodium Chloride 100 meq/Potassium Chloride 40 meq/ Magnesium Sulfate 15 

meq/Calcium Gluconate 15 meq/ Multivitamins 10 ml/Chromium/ Copper/Manganese/ 

Seleni/Zn 0.5 ml/ Insulin Human Regular 35 unit/ Total Parenteral Nutritio

n/Amino Acids/Dextrose/ Fat Emulsion Intravenous 1,400 ml @  58.333 mls/ hr TPN 

CONT IV  Last administered on 4/19/20at 22:46;  Start 4/19/20 at 22:00;  Stop 

4/20/20 at 21:59;  Status DC


Sodium Chloride 100 meq/Potassium Chloride 40 meq/ Magnesium Sulfate 20 

meq/Calcium Gluconate 15 meq/ Multivitamins 10 ml/Chromium/ Copper/Manganese/ 

Seleni/Zn 0.5 ml/ Insulin Human Regular 35 unit/ Total Parenteral 

Nutrition/Amino Acids/Dextrose/ Fat Emulsion Intravenous 1,400 ml @  58.333 mls/

hr TPN  CONT IV  Last administered on 4/20/20at 22:31;  Start 4/20/20 at 22:00; 

Stop 4/21/20 at 21:59;  Status DC


Fentanyl Citrate (Fentanyl 2ml Vial) 50 mcg PRN Q2HR  PRN IVP PAIN Last 

administered on 4/27/20at 13:32;  Start 4/20/20 at 21:00;  Stop 4/28/20 at 

12:53;  Status DC


Fentanyl Citrate (Fentanyl 2ml Vial) 25 mcg PRN Q2HR  PRN IVP PAIN;  Start 

4/20/20 at 21:00;  Stop 4/28/20 at 12:54;  Status DC


Enoxaparin Sodium (Lovenox 100mg Syringe) 100 mg Q12HR SQ ;  Start 4/21/20 at 

21:00;  Status UNV


Amino Acids/ Glycerin/ Electrolytes 1,000 ml @  75 mls/hr [id number] IV ;  Start 

4/20/20 at 21:15;  Status UNV


Sodium Chloride 1,000 ml @  1,000 mls/hr Q1H PRN IV hypotension;  Start 4/21/20 

at 07:56;  Stop 4/21/20 at 13:55;  Status DC


Albumin Human 200 ml @  200 mls/hr 1X PRN  PRN IV Hypotension Last administered 

on 4/21/20at 08:40;  Start 4/21/20 at 08:00;  Stop 4/21/20 at 13:59;  Status DC


Sodium Chloride 1,000 ml @  400 mls/hr Q2H30M PRN IV PATENCY;  Start 4/21/20 at 

07:56;  Stop 4/21/20 at 19:55;  Status DC


Info (PHARMACY MONITORING -- do not chart) 1 each PRN DAILY  PRN MC SEE 

COMMENTS;  Start 4/21/20 at 08:00;  Status UNV


Info (PHARMACY MONITORING -- do not chart) 1 each PRN DAILY  PRN MC SEE 

COMMENTS;  Start 4/21/20 at 08:00;  Status UNV


Daptomycin 430 mg/ Sodium Chloride 50 ml @  100 mls/hr Q24H IV  Last 

administered on 4/21/20at 12:35;  Start 4/21/20 at 09:00;  Stop 4/21/20 at 

12:49;  Status DC


Sodium Chloride 100 meq/Potassium Chloride 40 meq/ Magnesium Sulfate 20 

meq/Calcium Gluconate 15 meq/ Multivitamins 10 ml/Chromium/ Copper/Manganese/ 

Seleni/Zn 0.5 ml/ Insulin Human Regular 35 unit/ Total Parenteral 

Nutrition/Amino Acids/Dextrose/ Fat Emulsion Intravenous 1,400 ml @  58.333 mls/

hr TPN  CONT IV  Last administered on 4/21/20at 21:26;  Start 4/21/20 at 22:00; 

Stop 4/22/20 at 21:59;  Status DC


Daptomycin 430 mg/ Sodium Chloride 50 ml @  100 mls/hr Q48H IV ;  Start 4/23/20 

at 09:00;  Stop 4/22/20 at 11:55;  Status DC


Sodium Chloride 100 meq/Potassium Chloride 40 meq/ Magnesium Sulfate 20 

meq/Calcium Gluconate 15 meq/ Multivitamins 10 ml/Chromium/ Copper/Manganese/ 

Seleni/Zn 0.5 ml/ Insulin Human Regular 35 unit/ Total Parenteral 

Nutrition/Amino Acids/Dextrose/ Fat Emulsion Intravenous 1,400 ml @  58.333 mls/

hr TPN  CONT IV  Last administered on 4/22/20at 22:27;  Start 4/22/20 at 22:00; 

Stop 4/23/20 at 21:59;  Status DC


Daptomycin 430 mg/ Sodium Chloride 50 ml @  100 mls/hr Q24H IV  Last 

administered on 4/24/20at 15:07;  Start 4/22/20 at 13:00;  Stop 4/25/20 at 

13:15;  Status DC


Sodium Chloride 100 meq/Potassium Chloride 40 meq/ Magnesium Sulfate 20 

meq/Calcium Gluconate 10 meq/ Multivitamins 10 ml/Chromium/ Copper/Manganese/ 

Seleni/Zn 0.5 ml/ Insulin Human Regular 35 unit/ Total Parenteral 

Nutrition/Amino Acids/Dextrose/ Fat Emulsion Intravenous 1,400 ml @  58.333 mls/

hr TPN  CONT IV  Last administered on 4/24/20at 00:06;  Start 4/23/20 at 22:00; 

Stop 4/24/20 at 21:59;  Status DC


Alteplase, Recombinant (Cathflo For Central Catheter Clearance) 1 mg 1X  ONCE 

INT CAT  Last administered on 4/24/20at 11:44;  Start 4/24/20 at 10:45;  Stop 

4/24/20 at 10:46;  Status DC


Ondansetron HCl (Zofran) 4 mg PRN Q6HRS  PRN IV NAUSEA/VOMITING;  Start 4/27/20 

at 07:00;  Stop 4/28/20 at 06:59;  Status DC


Fentanyl Citrate (Fentanyl 2ml Vial) 25 mcg PRN Q5MIN  PRN IV MILD PAIN 1-3;  

Start 4/27/20 at 07:00;  Stop 4/28/20 at 06:59;  Status DC


Fentanyl Citrate (Fentanyl 2ml Vial) 50 mcg PRN Q5MIN  PRN IV MODERATE TO SEVERE

PAIN Last administered on 4/27/20at 10:17;  Start 4/27/20 at 07:00;  Stop 

4/28/20 at 06:59;  Status DC


Ringer's Solution 1,000 ml @  30 mls/hr Q24H IV ;  Start 4/27/20 at 07:00;  Stop

4/27/20 at 18:59;  Status DC


Lidocaine HCl (Xylocaine-Mpf 1% 2ml Vial) 2 ml PRN 1X  PRN ID PRIOR TO IV START;

 Start 4/27/20 at 07:00;  Stop 4/28/20 at 06:59;  Status DC


Prochlorperazine Edisylate (Compazine) 5 mg PACU PRN  PRN IV NAUSEA, MRX1;  

Start 4/27/20 at 07:00;  Stop 4/28/20 at 06:59;  Status DC


Sodium Acetate 50 meq/Potassium Acetate 55 meq/ Magnesium Sulfate 20 meq/Calcium

Gluconate 10 meq/ Multivitamins 10 ml/Chromium/ Copper/Manganese/ Seleni/Zn 0.5 

ml/ Insulin Human Regular 35 unit/ Total Parenteral Nutrition/Amino 

Acids/Dextrose/ Fat Emulsion Intravenous 1,400 ml @  58.333 mls/ hr TPN  CONT IV

;  Start 4/24/20 at 22:00;  Stop 4/24/20 at 14:15;  Status DC


Sodium Acetate 50 meq/Potassium Acetate 55 meq/ Magnesium Sulfate 20 meq/Calcium

Gluconate 10 meq/ Multivitamins 10 ml/Chromium/ Copper/Manganese/ Seleni/Zn 0.5 

ml/ Insulin Human Regular 35 unit/ Total Parenteral Nutrition/Amino 

Acids/Dextrose/ Fat Emulsion Intravenous 1,800 ml @  75 mls/hr TPN  CONT IV  

Last administered on 4/24/20at 22:38;  Start 4/24/20 at 22:00;  Stop 4/25/20 at 

21:59;  Status DC


Sodium Chloride 1,000 ml @  1,000 mls/hr Q1H PRN IV hypotension;  Start 4/24/20 

at 15:31;  Stop 4/24/20 at 21:30;  Status DC


Diphenhydramine HCl (Benadryl) 25 mg 1X PRN  PRN IV ITCHING;  Start 4/24/20 at 

15:45;  Stop 4/25/20 at 15:44;  Status DC


Diphenhydramine HCl (Benadryl) 25 mg 1X PRN  PRN IV ITCHING;  Start 4/24/20 at 

15:45;  Stop 4/25/20 at 15:44;  Status DC


Sodium Chloride 1,000 ml @  400 mls/hr Q2H30M PRN IV PATENCY;  Start 4/24/20 at 

15:31;  Stop 4/25/20 at 03:30;  Status DC


Info (PHARMACY MONITORING -- do not chart) 1 each PRN DAILY  PRN MC SEE 

COMMENTS;  Start 4/24/20 at 15:45;  Stop 5/26/20 at 14:14;  Status DC


Sodium Acetate 50 meq/Potassium Acetate 55 meq/ Magnesium Sulfate 20 meq/Calcium

Gluconate 10 meq/ Multivitamins 10 ml/Chromium/ Copper/Manganese/ Seleni/Zn 0.5 

ml/ Insulin Human Regular 35 unit/ Total Parenteral Nutrition/Amino 

Acids/Dextrose/ Fat Emulsion Intravenous 1,800 ml @  75 mls/hr TPN  CONT IV  

Last administered on 4/25/20at 22:03;  Start 4/25/20 at 22:00;  Stop 4/26/20 at 

21:59;  Status DC


Daptomycin 430 mg/ Sodium Chloride 50 ml @  100 mls/hr Q24H IV  Last 

administered on 4/30/20at 13:00;  Start 4/25/20 at 13:00;  Stop 4/30/20 at 

20:58;  Status DC


Heparin Sodium (Porcine) 1000 unit/Sodium Chloride 1,001 ml @  1,001 mls/hr 1X  

ONCE IRR ;  Start 4/27/20 at 06:00;  Stop 4/27/20 at 06:59;  Status DC


Potassium Acetate 55 meq/Magnesium Sulfate 20 meq/ Calcium Gluconate 10 meq/ 

Multivitamins 10 ml/Chromium/ Copper/Manganese/ Seleni/Zn 0.5 ml/ Insulin Human 

Regular 35 unit/ Total Parenteral Nutrition/Amino Acids/Dextrose/ Fat Emulsion 

Intravenous 1,920 ml @  80 mls/hr TPN  CONT IV  Last administered on 4/26/20at 

22:10;  Start 4/26/20 at 22:00;  Stop 4/27/20 at 21:59;  Status DC


Dexamethasone Sodium Phosphate (Decadron) 4 mg STK-MED ONCE .ROUTE ;  Start 

4/27/20 at 10:56;  Stop 4/27/20 at 10:57;  Status DC


Ondansetron HCl (Zofran) 4 mg STK-MED ONCE .ROUTE ;  Start 4/27/20 at 10:56;  

Stop 4/27/20 at 10:57;  Status DC


Rocuronium Bromide (Zemuron) 50 mg STK-MED ONCE .ROUTE ;  Start 4/27/20 at 

10:56;  Stop 4/27/20 at 10:57;  Status DC


Fentanyl Citrate (Fentanyl 2ml Vial) 100 mcg STK-MED ONCE .ROUTE ;  Start 

4/27/20 at 10:56;  Stop 4/27/20 at 10:57;  Status DC


Bupivacaine HCl/ Epinephrine Bitart (Sensorcain-Epi 0.5%-1:[id number] Mpf) 30 ml 

STK-MED ONCE .ROUTE  Last administered on 4/27/20at 12:01;  Start 4/27/20 at 

10:58;  Stop 4/27/20 at 10:58;  Status DC


Cellulose (Surgicel Hemostat 2x14) 1 each STK-MED ONCE .ROUTE ;  Start 4/27/20 

at 10:58;  Stop 4/27/20 at 10:59;  Status DC


Iohexol (Omnipaque 300 Mg/ml) 50 ml STK-MED ONCE .ROUTE ;  Start 4/27/20 at 

10:58;  Stop 4/27/20 at 10:59;  Status DC


Cellulose (Surgicel Hemostat 4x8) 1 each STK-MED ONCE .ROUTE ;  Start 4/27/20 at

10:58;  Stop 4/27/20 at 10:59;  Status DC


Bisacodyl (Dulcolax Supp) 10 mg STK-MED ONCE .ROUTE ;  Start 4/27/20 at 10:59;  

Stop 4/27/20 at 10:59;  Status DC


Heparin Sodium (Porcine) 1000 unit/Sodium Chloride 1,001 ml @  1,001 mls/hr 1X  

ONCE IRR ;  Start 4/27/20 at 12:00;  Stop 4/27/20 at 12:59;  Status DC


Propofol 20 ml @ As Directed STK-MED ONCE IV ;  Start 4/27/20 at 11:05;  Stop 

4/27/20 at 11:05;  Status DC


Sevoflurane (Ultane) 90 ml STK-MED ONCE IH ;  Start 4/27/20 at 11:05;  Stop 4/2 7/20 at 11:05;  Status DC


Sevoflurane (Ultane) 60 ml STK-MED ONCE IH ;  Start 4/27/20 at 12:26;  Stop 

4/27/20 at 12:27;  Status DC


Propofol 20 ml @ As Directed STK-MED ONCE IV ;  Start 4/27/20 at 12:26;  Stop 

4/27/20 at 12:27;  Status DC


Phenylephrine HCl (PHENYLEPHRINE in 0.9% NACL PF) 1 mg STK-MED ONCE IV ;  Start 

4/27/20 at 12:34;  Stop 4/27/20 at 12:34;  Status DC


Heparin Sodium (Porcine) (Heparin Sodium) 5,000 unit Q12HR SQ  Last administered

on 5/6/20at 20:57;  Start 4/27/20 at 21:00;  Stop 5/7/20 at 09:59;  Status DC


Sodium Chloride (Normal Saline Flush) 3 ml QSHIFT  PRN IV AFTER MEDS AND BLOOD 

DRAWS;  Start 4/27/20 at 13:45


Naloxone HCl (Narcan) 0.4 mg PRN Q2MIN  PRN IV SEE INSTRUCTIONS Last 

administered on 6/6/20at 15:15;  Start 4/27/20 at 13:45


Sodium Chloride 1,000 ml @  25 mls/hr Q24H IV  Last administered on 5/26/20at 

13:37;  Start 4/27/20 at 13:37;  Stop 5/29/20 at 13:09;  Status DC


Naloxone HCl (Narcan) 0.4 mg PRN Q2MIN  PRN IV SEE INSTRUCTIONS;  Start 4/27/20 

at 14:30;  Status UNV


Sodium Chloride 1,000 ml @  25 mls/hr Q24H IV ;  Start 4/27/20 at 14:30;  Status

UNV


Hydromorphone HCl 30 ml @ 0 mls/hr CONT PRN  PRN IV PER PROTOCOL Last 

administered on 5/2/20at 16:08;  Start 4/27/20 at 14:30;  Stop 5/4/20 at 08:55; 

Status DC


Potassium Acetate 55 meq/Magnesium Sulfate 20 meq/ Calcium Gluconate 10 meq/ 

Multivitamins 10 ml/Chromium/ Copper/Manganese/ Seleni/Zn 0.5 ml/ Insulin Human 

Regular 35 unit/ Total Parenteral Nutrition/Amino Acids/Dextrose/ Fat Emulsion 

Intravenous 1,920 ml @  80 mls/hr TPN  CONT IV  Last administered on 4/27/20at 

22:01;  Start 4/27/20 at 22:00;  Stop 4/28/20 at 21:59;  Status DC


Bumetanide (Bumex) 2 mg [id number] IV  Last administered on 5/1/20at 13:50;  Start 

4/28/20 at 14:00;  Stop 5/2/20 at 14:10;  Status DC


Meropenem 1 gm/ Sodium Chloride 100 ml @  200 mls/hr Q8HRS IV  Last administered

on 5/22/20at 05:53;  Start 4/28/20 at 14:00;  Stop 5/22/20 at 09:31;  Status DC


Potassium Acetate 55 meq/Magnesium Sulfate 20 meq/ Calcium Gluconate 10 meq/ 

Multivitamins 10 ml/Chromium/ Copper/Manganese/ Seleni/Zn 0.5 ml/ Insulin Human 

Regular 35 unit/ Total Parenteral Nutrition/Amino Acids/Dextrose/ Fat Emulsion 

Intravenous 1,920 ml @  80 mls/hr TPN  CONT IV  Last administered on 4/28/20at 

22:02;  Start 4/28/20 at 22:00;  Stop 4/29/20 at 21:59;  Status DC


Hydromorphone HCl (Dilaudid Standard PCA) 12 mg STK-MED ONCE IV ;  Start 4/27/20

at 14:35;  Stop 4/28/20 at 13:53;  Status DC


Artificial Tears (Artificial Tears) 1 drop PRN Q15MIN  PRN OU DRY EYE Last 

administered on 6/15/20at 03:38;  Start 4/29/20 at 05:30


Hydromorphone HCl (Dilaudid Standard PCA) 12 mg STK-MED ONCE IV ;  Start 4/28/20

at 12:05;  Stop 4/29/20 at 09:15;  Status DC


Potassium Acetate 65 meq/Magnesium Sulfate 20 meq/ Calcium Gluconate 10 meq/ 

Multivitamins 10 ml/Chromium/ Copper/Manganese/ Seleni/Zn 0.5 ml/ Insulin Human 

Regular 30 unit/ Total Parenteral Nutrition/Amino Acids/Dextrose/ Fat Emulsion 

Intravenous 1,920 ml @  80 mls/hr TPN  CONT IV  Last administered on 4/29/20at 

22:22;  Start 4/29/20 at 22:00;  Stop 4/30/20 at 21:59;  Status DC


Cyclobenzaprine HCl (Flexeril) 10 mg PRN Q6HRS  PRN PO MUSCLE SPASMS;  Start 

4/30/20 at 10:45


Potassium Acetate 55 meq/Magnesium Sulfate 20 meq/ Calcium Gluconate 10 meq/ 

Multivitamins 10 ml/Chromium/ Copper/Manganese/ Seleni/Zn 0.5 ml/ Insulin Human 

Regular 30 unit/ Total Parenteral Nutrition/Amino Acids/Dextrose/ Fat Emulsion 

Intravenous 1,920 ml @  80 mls/hr TPN  CONT IV  Last administered on 5/1/20at 

01:00;  Start 4/30/20 at 22:00;  Stop 5/1/20 at 21:59;  Status DC


Magnesium Sulfate 50 ml @ 25 mls/hr 1X  ONCE IV  Last administered on 4/30/20at 

17:18;  Start 4/30/20 at 12:45;  Stop 4/30/20 at 14:44;  Status DC


Potassium Chloride/Water 100 ml @  100 mls/hr 1X  ONCE IV  Last administered on 

5/1/20at 11:27;  Start 5/1/20 at 12:00;  Stop 5/1/20 at 12:59;  Status DC


Hydromorphone HCl (Dilaudid Standard PCA) 12 mg STK-MED ONCE IV ;  Start 4/29/20

at 10:50;  Stop 5/1/20 at 11:02;  Status DC


Hydromorphone HCl (Dilaudid Standard PCA) 12 mg STK-MED ONCE IV ;  Start 4/30/20

at 13:47;  Stop 5/1/20 at 11:03;  Status DC


Potassium Acetate 30 meq/Magnesium Sulfate 20 meq/ Calcium Gluconate 10 meq/ 

Multivitamins 10 ml/Chromium/ Copper/Manganese/ Seleni/Zn 0.5 ml/ Insulin Human 

Regular 30 unit/ Potassium Chloride 30 meq/ Total Parenteral Nutrition/Amino 

Acids/Dextrose/ Fat Emulsion Intravenous 1,920 ml @  80 mls/hr TPN  CONT IV  

Last administered on 5/1/20at 22:34;  Start 5/1/20 at 22:00;  Stop 5/2/20 at 

21:59;  Status DC


Potassium Chloride/Water 100 ml @  100 mls/hr Q1H IV  Last administered on 

5/2/20at 13:05;  Start 5/2/20 at 07:00;  Stop 5/2/20 at 10:59;  Status DC


Magnesium Sulfate 50 ml @ 25 mls/hr 1X  ONCE IV  Last administered on 5/2/20at 

10:34;  Start 5/2/20 at 10:30;  Stop 5/2/20 at 12:29;  Status DC


Potassium Chloride 75 meq/ Magnesium Sulfate 20 meq/Calcium Gluconate 10 meq/ 

Multivitamins 10 ml/Chromium/ Copper/Manganese/ Seleni/Zn 0.5 ml/ Insulin Human 

Regular 30 unit/ Total Parenteral Nutrition/Amino Acids/Dextrose/ Fat Emulsion 

Intravenous 1,920 ml @  80 mls/hr TPN  CONT IV  Last administered on 5/2/20at 21

:51;  Start 5/2/20 at 22:00;  Stop 5/3/20 at 22:00;  Status DC


Potassium Chloride 75 meq/ Magnesium Sulfate 20 meq/Calcium Gluconate 10 meq/ 

Multivitamins 10 ml/Chromium/ Copper/Manganese/ Seleni/Zn 0.5 ml/ Insulin Human 

Regular 25 unit/ Total Parenteral Nutrition/Amino Acids/Dextrose/ Fat Emulsion 

Intravenous 1,920 ml @  80 mls/hr TPN  CONT IV  Last administered on 5/3/20at 

22:04;  Start 5/3/20 at 22:00;  Stop 5/4/20 at 21:59;  Status DC


Hydromorphone HCl (Dilaudid) 0.4 mg PRN Q4HRS  PRN IVP PAIN Last administered on

5/4/20at 10:57;  Start 5/4/20 at 09:00;  Stop 5/4/20 at 18:59;  Status DC


Micafungin Sodium 100 mg/Dextrose 100 ml @  100 mls/hr Q24H IV  Last 

administered on 5/26/20at 12:17;  Start 5/4/20 at 11:00;  Stop 5/27/20 at 09:59;

 Status DC


Daptomycin 485 mg/ Sodium Chloride 50 ml @  100 mls/hr Q24H IV  Last 

administered on 5/11/20at 13:10;  Start 5/4/20 at 11:00;  Stop 5/12/20 at 07:44;

 Status DC


Potassium Chloride 75 meq/ Magnesium Sulfate 15 meq/Calcium Gluconate 8 meq/ 

Multivitamins 10 ml/Chromium/ Copper/Manganese/ Seleni/Zn 0.5 ml/ Insulin Human 

Regular 25 unit/ Total Parenteral Nutrition/Amino Acids/Dextrose/ Fat Emulsion 

Intravenous 1,920 ml @  80 mls/hr TPN  CONT IV  Last administered on 5/4/20at 

23:08;  Start 5/4/20 at 22:00;  Stop 5/5/20 at 21:59;  Status DC


Haloperidol Lactate (Haldol Inj) 3 mg 1X  ONCE IVP  Last administered on 

5/4/20at 14:37;  Start 5/4/20 at 14:30;  Stop 5/4/20 at 14:31;  Status DC


Hydromorphone HCl (Dilaudid) 1 mg PRN Q4HRS  PRN IVP PAIN Last administered on 

5/18/20at 06:25;  Start 5/4/20 at 19:00;  Stop 5/18/20 at 17:10;  Status DC


Potassium Chloride 75 meq/ Magnesium Sulfate 15 meq/Calcium Gluconate 8 meq/ 

Multivitamins 10 ml/Chromium/ Copper/Manganese/ Seleni/Zn 0.5 ml/ Insulin Human 

Regular 20 unit/ Total Parenteral Nutrition/Amino Acids/Dextrose/ Fat Emulsion 

Intravenous 1,920 ml @  80 mls/hr TPN  CONT IV  Last administered on 5/5/20at 

22:10;  Start 5/5/20 at 22:00;  Stop 5/6/20 at 21:59;  Status DC


Lidocaine HCl (Buffered Lidocaine 1%) 3 ml STK-MED ONCE .ROUTE ;  Start 5/6/20 

at 11:31;  Stop 5/6/20 at 11:31;  Status DC


Lidocaine HCl (Buffered Lidocaine 1%) 3 ml STK-MED ONCE .ROUTE ;  Start 5/6/20 

at 12:28;  Stop 5/6/20 at 12:29;  Status DC


Lidocaine HCl (Buffered Lidocaine 1%) 6 ml 1X  ONCE INJ  Last administered on 

5/6/20at 12:53;  Start 5/6/20 at 12:45;  Stop 5/6/20 at 12:46;  Status DC


Potassium Chloride 75 meq/ Magnesium Sulfate 15 meq/Calcium Gluconate 8 meq/ 

Multivitamins 10 ml/Chromium/ Copper/Manganese/ Seleni/Zn 0.5 ml/ Insulin Human 

Regular 20 unit/ Total Parenteral Nutrition/Amino Acids/Dextrose/ Fat Emulsion 

Intravenous 1,920 ml @  80 mls/hr TPN  CONT IV  Last administered on 5/6/20at 

22:00;  Start 5/6/20 at 22:00;  Stop 5/7/20 at 21:59;  Status DC


Potassium Chloride 75 meq/ Magnesium Sulfate 15 meq/Calcium Gluconate 8 meq/ 

Multivitamins 10 ml/Chromium/ Copper/Manganese/ Seleni/Zn 0.5 ml/ Insulin Human 

Regular 15 unit/ Total Parenteral Nutrition/Amino Acids/Dextrose/ Fat Emulsion 

Intravenous 1,920 ml @  80 mls/hr TPN  CONT IV  Last administered on 5/7/20at 2

2:28;  Start 5/7/20 at 22:00;  Stop 5/8/20 at 21:59;  Status DC


Vecuronium Bromide (Norcuron Bolus) 6 mg PRN Q6HRS  PRN IV VENT ASYNCHRONY;  

Start 5/7/20 at 19:15;  Stop 5/7/20 at 19:35;  Status DC


Bumetanide (Bumex) 2 mg 1X  ONCE IV  Last administered on 5/7/20at 22:09;  Start

5/7/20 at 19:45;  Stop 5/7/20 at 19:46;  Status DC


Lidocaine HCl (Buffered Lidocaine 1%) 3 ml STK-MED ONCE .ROUTE ;  Start 5/8/20 

at 07:59;  Stop 5/8/20 at 07:59;  Status DC


Midazolam HCl (Versed) 5 mg STK-MED ONCE .ROUTE ;  Start 5/8/20 at 08:36;  Stop 

5/8/20 at 08:36;  Status DC


Fentanyl Citrate (Fentanyl 5ml Vial) 250 mcg STK-MED ONCE .ROUTE ;  Start 5/8/20

at 08:36;  Stop 5/8/20 at 08:37;  Status DC


Lidocaine HCl (Buffered Lidocaine 1%) 3 ml 1X  ONCE IJ  Last administered on 

5/8/20at 09:30;  Start 5/8/20 at 09:15;  Stop 5/8/20 at 09:16;  Status DC


Midazolam HCl (Versed) 5 mg 1X  ONCE IV  Last administered on 5/8/20at 09:30;  

Start 5/8/20 at 09:15;  Stop 5/8/20 at 09:16;  Status DC


Fentanyl Citrate (Fentanyl 5ml Vial) 250 mcg 1X  ONCE IV  Last administered on 

5/8/20at 09:30;  Start 5/8/20 at 09:15;  Stop 5/8/20 at 09:16;  Status DC


Bumetanide (Bumex) 2 mg DAILY IV  Last administered on 5/18/20at 08:07;  Start 

5/8/20 at 10:00;  Stop 5/18/20 at 17:15;  Status DC


Potassium Chloride 75 meq/ Magnesium Sulfate 15 meq/ Multivitamins 10 

ml/Chromium/ Copper/Manganese/ Seleni/Zn 0.5 ml/ Insulin Human Regular 15 unit/ 

Total Parenteral Nutrition/Amino Acids/Dextrose/ Fat Emulsion Intravenous 1,920 

ml @  80 mls/hr TPN  CONT IV  Last administered on 5/8/20at 21:59;  Start 5/8/20

at 22:00;  Stop 5/9/20 at 21:59;  Status DC


Metoclopramide HCl (Reglan Vial) 10 mg PRN Q3HRS  PRN IVP NAUSEA/VOMITING-3rd 

choice Last administered on 5/14/20at 04:25;  Start 5/9/20 at 16:45


Potassium Chloride 75 meq/ Magnesium Sulfate 15 meq/ Multivitamins 10 

ml/Chromium/ Copper/Manganese/ Seleni/Zn 0.5 ml/ Insulin Human Regular 15 unit/ 

Total Parenteral Nutrition/Amino Acids/Dextrose/ Fat Emulsion Intravenous 1,920 

ml @  80 mls/hr TPN  CONT IV  Last administered on 5/9/20at 22:41;  Start 5/9/20

at 22:00;  Stop 5/10/20 at 21:59;  Status DC


Magnesium Sulfate 50 ml @ 25 mls/hr 1X  ONCE IV  Last administered on 5/10/20at 

10:44;  Start 5/10/20 at 09:00;  Stop 5/10/20 at 10:59;  Status DC


Potassium Chloride/Water 100 ml @  100 mls/hr 1X  ONCE IV  Last administered on 

5/10/20at 09:37;  Start 5/10/20 at 09:00;  Stop 5/10/20 at 09:59;  Status DC


Duloxetine HCl (Cymbalta) 30 mg DAILY PO  Last administered on 5/11/20at 09:48; 

Start 5/10/20 at 14:00;  Stop 5/13/20 at 10:25;  Status DC


Potassium Chloride 80 meq/ Magnesium Sulfate 20 meq/ Multivitamins 10 

ml/Chromium/ Copper/Manganese/ Seleni/Zn 0.5 ml/ Insulin Human Regular 15 unit/ 

Total Parenteral Nutrition/Amino Acids/Dextrose/ Fat Emulsion Intravenous 1,920 

ml @  80 mls/hr TPN  CONT IV  Last administered on 5/10/20at 21:42;  Start 

5/10/20 at 22:00;  Stop 5/11/20 at 21:59;  Status DC


Potassium Chloride 80 meq/ Magnesium Sulfate 20 meq/ Multivitamins 10 

ml/Chromium/ Copper/Manganese/ Seleni/Zn 0.5 ml/ Insulin Human Regular 15 unit/ 

Total Parenteral Nutrition/Amino Acids/Dextrose/ Fat Emulsion Intravenous 1,920 

ml @  80 mls/hr TPN  CONT IV  Last administered on 5/11/20at 22:20;  Start 

5/11/20 at 22:00;  Stop 5/12/20 at 21:59;  Status DC


Lidocaine HCl (Buffered Lidocaine 1%) 3 ml STK-MED ONCE .ROUTE ;  Start 5/12/20 

at 09:54;  Stop 5/12/20 at 09:55;  Status DC


Hydromorphone HCl (Dilaudid Standard PCA) 12 mg STK-MED ONCE IV ;  Start 5/1/20 

at 15:50;  Stop 5/12/20 at 11:24;  Status DC


Potassium Chloride 80 meq/ Magnesium Sulfate 20 meq/ Multivitamins 10 

ml/Chromium/ Copper/Manganese/ Seleni/Zn 0.5 ml/ Insulin Human Regular 15 unit/ 

Total Parenteral Nutrition/Amino Acids/Dextrose/ Fat Emulsion Intravenous 1,920 

ml @  80 mls/hr TPN  CONT IV  Last administered on 5/12/20at 21:40;  Start 

5/12/20 at 22:00;  Stop 5/13/20 at 21:59;  Status DC


Lidocaine HCl (Buffered Lidocaine 1%) 6 ml 1X  ONCE INJ  Last administered on 

5/12/20at 14:15;  Start 5/12/20 at 14:15;  Stop 5/12/20 at 14:16;  Status DC


Potassium Chloride 80 meq/ Magnesium Sulfate 20 meq/ Multivitamins 10 

ml/Chromium/ Copper/Manganese/ Seleni/Zn 1 ml/ Insulin Human Regular 15 unit/ 

Total Parenteral Nutrition/Amino Acids/Dextrose/ Fat Emulsion Intravenous 1,920 

ml @  80 mls/hr TPN  CONT IV  Last administered on 5/13/20at 22:04;  Start 

5/13/20 at 22:00;  Stop 5/14/20 at 21:59;  Status DC


Potassium Chloride/Water 100 ml @  100 mls/hr 1X  ONCE IV  Last administered on 

5/14/20at 11:34;  Start 5/14/20 at 11:00;  Stop 5/14/20 at 11:59;  Status DC


Potassium Chloride 90 meq/ Magnesium Sulfate 20 meq/ Multivitamins 10 

ml/Chromium/ Copper/Manganese/ Seleni/Zn 1 ml/ Insulin Human Regular 15 unit/ 

Total Parenteral Nutrition/Amino Acids/Dextrose/ Fat Emulsion Intravenous 1,920 

ml @  80 mls/hr TPN  CONT IV  Last administered on 5/14/20at 22:57;  Start 

5/14/20 at 22:00;  Stop 5/15/20 at 21:59;  Status DC


Potassium Chloride 90 meq/ Magnesium Sulfate 20 meq/ Multivitamins 10 

ml/Chromium/ Copper/Manganese/ Seleni/Zn 1 ml/ Insulin Human Regular 15 unit/ 

Total Parenteral Nutrition/Amino Acids/Dextrose/ Fat Emulsion Intravenous 1,920 

ml @  80 mls/hr TPN  CONT IV  Last administered on 5/15/20at 22:48;  Start 

5/15/20 at 22:00;  Stop 5/16/20 at 21:59;  Status DC


Potassium Chloride 90 meq/ Magnesium Sulfate 20 meq/ Multivitamins 10 

ml/Chromium/ Copper/Manganese/ Seleni/Zn 1 ml/ Insulin Human Regular 15 unit/ 

Total Parenteral Nutrition/Amino Acids/Dextrose/ Fat Emulsion Intravenous 1,890 

ml @  78.75 mls/ hr TPN  CONT IV  Last administered on 5/16/20at 22:15;  Start 

5/16/20 at 22:00;  Stop 5/17/20 at 21:59;  Status DC


Linezolid/Dextrose 300 ml @  300 mls/hr Q12HR IV  Last administered on 5/19/20at

21:08;  Start 5/17/20 at 09:00;  Stop 5/20/20 at 08:11;  Status DC


Daptomycin 450 mg/ Sodium Chloride 50 ml @  100 mls/hr Q24H IV  Last 

administered on 5/20/20at 09:25;  Start 5/17/20 at 09:00;  Stop 5/21/20 at 

08:30;  Status DC


Potassium Chloride 90 meq/ Magnesium Sulfate 20 meq/ Multivitamins 10 

ml/Chromium/ Copper/Manganese/ Seleni/Zn 1 ml/ Insulin Human Regular 15 unit/ 

Total Parenteral Nutrition/Amino Acids/Dextrose/ Fat Emulsion Intravenous 1,890 

ml @  78.75 mls/ hr TPN  CONT IV  Last administered on 5/17/20at 21:34;  Start 

5/17/20 at 22:00;  Stop 5/18/20 at 21:59;  Status DC


Lorazepam (Ativan Inj) 2 mg STK-MED ONCE .ROUTE ;  Start 5/17/20 at 14:58;  Stop

5/17/20 at 14:58;  Status DC


Metoprolol Tartrate (Lopressor Vial) 5 mg 1X  ONCE IVP  Last administered on 

5/17/20at 15:31;  Start 5/17/20 at 15:15;  Stop 5/17/20 at 15:16;  Status DC


Lorazepam (Ativan Inj) 2 mg 1X  ONCE IVP  Last administered on 5/17/20at 15:30; 

Start 5/17/20 at 15:15;  Stop 5/17/20 at 15:16;  Status DC


Enoxaparin Sodium (Lovenox 40mg Syringe) 40 mg Q24H SQ  Last administered on 

6/5/20at 17:44;  Start 5/17/20 at 17:00;  Stop 6/7/20 at 06:50;  Status DC


Lorazepam (Ativan Inj) 1 mg PRN Q4HRS  PRN IVP ANXIETY / AGITATION MILD-MOD Last

administered on 5/31/20at 15:55;  Start 5/17/20 at 19:15;  Stop 6/2/20 at 11:45;

 Status DC


Lorazepam (Ativan Inj) 2 mg PRN Q4HRS  PRN IVP ANXIETY / AGITATION SEVERE Last 

administered on 6/1/20at 07:55;  Start 5/17/20 at 19:15;  Stop 6/2/20 at 11:45; 

Status DC


Fentanyl Citrate (Fentanyl 2ml Vial) 50 mcg PRN Q4HRS  PRN IVP SEVERE PAIN Last 

administered on 6/13/20at 05:15;  Start 5/18/20 at 13:15;  Stop 6/14/20 at 

09:29;  Status DC


Fentanyl Citrate (Fentanyl 2ml Vial) 25 mcg PRN Q4HRS  PRN IVP MODERATE PAIN 

Last administered on 6/13/20at 00:27;  Start 5/18/20 at 13:15;  Stop 6/14/20 at 

09:30;  Status DC


Potassium Chloride 90 meq/ Magnesium Sulfate 20 meq/ Multivitamins 10 

ml/Chromium/ Copper/Manganese/ Seleni/Zn 1 ml/ Insulin Human Regular 15 unit/ 

Total Parenteral Nutrition/Amino Acids/Dextrose/ Fat Emulsion Intravenous 1,890 

ml @  78.75 mls/ hr TPN  CONT IV  Last administered on 5/18/20at 22:18;  Start 

5/18/20 at 22:00;  Stop 5/19/20 at 21:59;  Status DC


Furosemide (Lasix) 40 mg 1X  ONCE IVP  Last administered on 5/18/20at 21:51;  

Start 5/18/20 at 21:45;  Stop 5/18/20 at 21:48;  Status DC


Albumin Human 100 ml @  100 mls/hr 1X PRN  PRN IV SEE COMMENTS;  Start 5/19/20 

at 01:30


Furosemide (Lasix) 40 mg [id number] IVP  Last administered on 6/3/20at 08:04;  Start 

5/19/20 at 14:00;  Stop 6/3/20 at 13:07;  Status DC


Potassium Chloride 90 meq/ Magnesium Sulfate 20 meq/ Multivitamins 10 

ml/Chromium/ Copper/Manganese/ Seleni/Zn 1 ml/ Insulin Human Regular 15 unit/ 

Total Parenteral Nutrition/Amino Acids/Dextrose/ Fat Emulsion Intravenous 1,800 

ml @  75 mls/hr TPN  CONT IV  Last administered on 5/19/20at 22:31;  Start 

5/19/20 at 22:00;  Stop 5/20/20 at 21:59;  Status DC


Potassium Chloride 90 meq/ Magnesium Sulfate 20 meq/ Multivitamins 10 

ml/Chromium/ Copper/Manganese/ Seleni/Zn 1 ml/ Insulin Human Regular 15 unit/ 

Total Parenteral Nutrition/Amino Acids/Dextrose/ Fat Emulsion Intravenous 1,800 

ml @  75 mls/hr TPN  CONT IV  Last administered on 5/20/20at 22:28;  Start 

5/20/20 at 22:00;  Stop 5/21/20 at 21:59;  Status DC


Potassium Chloride 110 meq/ Magnesium Sulfate 20 meq/ Multivitamins 10 

ml/Chromium/ Copper/Manganese/ Seleni/Zn 1 ml/ Insulin Human Regular 15 unit/ 

Total Parenteral Nutrition/Amino Acids/Dextrose/ Fat Emulsion Intravenous 1,800 

ml @  75 mls/hr TPN  CONT IV  Last administered on 5/21/20at 22:01;  Start 

5/21/20 at 22:00;  Stop 5/22/20 at 21:59;  Status DC


Saliva Substitute (Biotene Moisturizing Mouth) 2 spray PRN Q15MIN  PRN PO DRY 

MOUTH;  Start 5/21/20 at 11:00


Potassium Chloride 110 meq/ Magnesium Sulfate 20 meq/ Multivitamins 10 

ml/Chromium/ Copper/Manganese/ Seleni/Zn 1 ml/ Insulin Human Regular 15 unit/ 

Total Parenteral Nutrition/Amino Acids/Dextrose/ Fat Emulsion Intravenous 1,800 

ml @  75 mls/hr TPN  CONT IV  Last administered on 5/22/20at 22:21;  Start 

5/22/20 at 22:00;  Stop 5/23/20 at 21:59;  Status DC


Potassium Chloride 110 meq/ Magnesium Sulfate 20 meq/ Multivitamins 10 

ml/Chromium/ Copper/Manganese/ Seleni/Zn 1 ml/ Insulin Human Regular 15 unit/ 

Total Parenteral Nutrition/Amino Acids/Dextrose/ Fat Emulsion Intravenous 1,800 

ml @  75 mls/hr TPN  CONT IV  Last administered on 5/23/20at 22:04;  Start 

5/23/20 at 22:00;  Stop 5/24/20 at 21:59;  Status DC


Potassium Chloride 110 meq/ Magnesium Sulfate 20 meq/ Multivitamins 10 

ml/Chromium/ Copper/Manganese/ Seleni/Zn 1 ml/ Insulin Human Regular 15 unit/ 

Total Parenteral Nutrition/Amino Acids/Dextrose/ Fat Emulsion Intravenous 1,800 

ml @  75 mls/hr TPN  CONT IV  Last administered on 5/24/20at 22:48;  Start 

5/24/20 at 22:00;  Stop 5/25/20 at 21:59;  Status DC


Potassium Chloride 70 meq/ Magnesium Sulfate 20 meq/ Multivitamins 10 

ml/Chromium/ Copper/Manganese/ Seleni/Zn 1 ml/ Insulin Human Regular 15 unit/ 

Total Parenteral Nutrition/Amino Acids/Dextrose/ Fat Emulsion Intravenous 1,800 

ml @  75 mls/hr TPN  CONT IV  Last administered on 5/25/20at 21:39;  Start 

5/25/20 at 22:00;  Stop 5/26/20 at 21:59;  Status DC


Meropenem 500 mg/ Sodium Chloride 50 ml @  100 mls/hr Q6HRS IV  Last 

administered on 5/27/20at 06:02;  Start 5/25/20 at 18:00;  Stop 5/27/20 at 09:

59;  Status DC


Barium Sulfate (Varibar Thin Liquid Apple) 148 gm 1X  ONCE PO ;  Start 5/26/20 

at 11:45;  Stop 5/26/20 at 11:49;  Status DC


Potassium Chloride 70 meq/ Magnesium Sulfate 20 meq/ Multivitamins 10 

ml/Chromium/ Copper/Manganese/ Seleni/Zn 1 ml/ Insulin Human Regular 15 unit/ 

Total Parenteral Nutrition/Amino Acids/Dextrose/ Fat Emulsion Intravenous 1,800 

ml @  75 mls/hr TPN  CONT IV  Last administered on 5/26/20at 22:27;  Start 

5/26/20 at 22:00;  Stop 5/27/20 at 21:59;  Status DC


Piperacillin Sod/ Tazobactam Sod 3.375 gm/Sodium Chloride 50 ml @  100 mls/hr 

Q6HRS IV  Last administered on 6/4/20at 06:10;  Start 5/27/20 at 12:00;  Stop 

6/4/20 at 07:26;  Status DC


Potassium Chloride 70 meq/ Magnesium Sulfate 20 meq/ Multivitamins 10 

ml/Chromium/ Copper/Manganese/ Seleni/Zn 1 ml/ Insulin Human Regular 15 unit/ 

Total Parenteral Nutrition/Amino Acids/Dextrose/ Fat Emulsion Intravenous 1,800 

ml @  75 mls/hr TPN  CONT IV  Last administered on 5/27/20at 22:03;  Start 

5/27/20 at 22:00;  Stop 5/28/20 at 21:59;  Status DC


Potassium Chloride 70 meq/ Magnesium Sulfate 20 meq/ Multivitamins 10 

ml/Chromium/ Copper/Manganese/ Seleni/Zn 1 ml/ Insulin Human Regular 15 unit/ 

Total Parenteral Nutrition/Amino Acids/Dextrose/ Fat Emulsion Intravenous 1,800 

ml @  75 mls/hr TPN  CONT IV  Last administered on 5/28/20at 22:33;  Start 

5/28/20 at 22:00;  Stop 5/29/20 at 21:59;  Status DC


Potassium Chloride 70 meq/ Magnesium Sulfate 20 meq/ Multivitamins 10 

ml/Chromium/ Copper/Manganese/ Seleni/Zn 1 ml/ Insulin Human Regular 15 unit/ 

Total Parenteral Nutrition/Amino Acids/Dextrose/ Fat Emulsion Intravenous 1,800 

ml @  75 mls/hr TPN  CONT IV  Last administered on 5/29/20at 23:13;  Start 

5/29/20 at 22:00;  Stop 5/30/20 at 21:59;  Status DC


Potassium Chloride 80 meq/ Magnesium Sulfate 20 meq/ Multivitamins 10 

ml/Chromium/ Copper/Manganese/ Seleni/Zn 1 ml/ Insulin Human Regular 15 unit/ 

Total Parenteral Nutrition/Amino Acids/Dextrose/ Fat Emulsion Intravenous 1,800 

ml @  75 mls/hr TPN  CONT IV  Last administered on 5/30/20at 22:30;  Start 

5/30/20 at 22:00;  Stop 5/31/20 at 21:59;  Status DC


Potassium Chloride 80 meq/ Magnesium Sulfate 20 meq/ Multivitamins 10 ml/

Chromium/ Copper/Manganese/ Seleni/Zn 1 ml/ Insulin Human Regular 15 unit/ Total

Parenteral Nutrition/Amino Acids/Dextrose/ Fat Emulsion Intravenous 1,800 ml @  

75 mls/hr TPN  CONT IV  Last administered on 5/31/20at 21:54;  Start 5/31/20 at 

22:00;  Stop 6/1/20 at 21:59;  Status DC


Potassium Chloride/Water 100 ml @  100 mls/hr 1X  ONCE IV  Last administered on 

6/1/20at 10:15;  Start 6/1/20 at 10:00;  Stop 6/1/20 at 10:59;  Status DC


Potassium Chloride 90 meq/ Magnesium Sulfate 20 meq/ Multivitamins 10 ml/Chr

omium/ Copper/Manganese/ Seleni/Zn 1 ml/ Insulin Human Regular 20 unit/ Total 

Parenteral Nutrition/Amino Acids/Dextrose/ Fat Emulsion Intravenous 1,800 ml @  

75 mls/hr TPN  CONT IV  Last administered on 6/1/20at 22:28;  Start 6/1/20 at 

22:00;  Stop 6/2/20 at 21:59;  Status DC


Potassium Chloride 90 meq/ Magnesium Sulfate 20 meq/ Multivitamins 10 

ml/Chromium/ Copper/Manganese/ Seleni/Zn 1 ml/ Insulin Human Regular 20 unit/ 

Total Parenteral Nutrition/Amino Acids/Dextrose/ Fat Emulsion Intravenous 1,800 

ml @  75 mls/hr TPN  CONT IV  Last administered on 6/2/20at 22:08;  Start 6/2/20

at 22:00;  Stop 6/3/20 at 21:59;  Status DC


Lorazepam (Ativan Inj) 0.25 mg PRN Q4HRS  PRN IVP ANXIETY / AGITATION Last 

administered on 6/13/20at 02:25;  Start 6/3/20 at 07:30


Potassium Chloride 90 meq/ Magnesium Sulfate 20 meq/ Multivitamins 10 

ml/Chromium/ Copper/Manganese/ Seleni/Zn 1 ml/ Insulin Human Regular 20 unit/ 

Total Parenteral Nutrition/Amino Acids/Dextrose/ Fat Emulsion Intravenous 1,800 

ml @  75 mls/hr TPN  CONT IV  Last administered on 6/3/20at 23:13;  Start 6/3/20

at 22:00;  Stop 6/4/20 at 21:59;  Status DC


Furosemide (Lasix) 40 mg DAILY IVP  Last administered on 6/5/20at 11:14;  Start 

6/3/20 at 13:30;  Stop 6/7/20 at 09:12;  Status DC


Fluoxetine HCl (PROzac) 20 mg QHS PEG  Last administered on 6/14/20at 21:32;  

Start 6/4/20 at 21:00


Fentanyl (Duragesic 50mcg/ Hr Patch) 1 patch Q72H TD  Last administered on 

6/4/20at 21:22;  Start 6/4/20 at 21:00;  Stop 6/13/20 at 12:00;  Status DC


Potassium Chloride 40 meq/ Potassium Acetate 60 meq/Magnesium Sulfate 10 meq/ 

Multivitamins 10 ml/Chromium/ Copper/Manganese/ Seleni/Zn 1 ml/ Insulin Human 

Regular 20 unit/ Total Parenteral Nutrition/Amino Acids/Dextrose/ Fat Emulsion 

Intravenous 1,800 ml @  75 mls/hr TPN  CONT IV  Last administered on 6/5/20at 

00:03;  Start 6/4/20 at 22:00;  Stop 6/5/20 at 21:59;  Status DC


Potassium Acetate 80 meq/Magnesium Sulfate 5 meq/ Multivitamins 10 ml/Chromium/ 

Copper/Manganese/ Seleni/Zn 1 ml/ Insulin Human Regular 20 unit/ Total 

Parenteral Nutrition/Amino Acids/Dextrose/ Fat Emulsion Intravenous 1,920 ml @  

80 mls/hr TPN  CONT IV  Last administered on 6/5/20at 21:59;  Start 6/5/20 at 

22:00;  Stop 6/6/20 at 21:59;  Status DC


Potassium Acetate 60 meq/Magnesium Sulfate 5 meq/ Multivitamins 10 ml/Chromium/ 

Copper/Manganese/ Seleni/Zn 1 ml/ Insulin Human Regular 30 unit/ Total 

Parenteral Nutrition/Amino Acids/Dextrose/ Fat Emulsion Intravenous 1,920 ml @  

80 mls/hr TPN  CONT IV  Last administered on 6/6/20at 21:54;  Start 6/6/20 at 

22:00;  Stop 6/7/20 at 21:59;  Status DC


Norepinephrine Bitartrate 8 mg/ Dextrose 258 ml @  13.332 mls/ hr CONT  PRN IV 

PER PROTOCOL Last administered on 6/7/20at 21:46;  Start 6/7/20 at 06:30


Albumin Human 500 ml @  125 mls/hr 1X  ONCE IV  Last administered on 6/7/20at 

08:10;  Start 6/7/20 at 08:15;  Stop 6/7/20 at 12:14;  Status DC


Potassium Acetate 40 meq/Magnesium Sulfate 5 meq/ Multivitamins 10 ml/Chromium/ 

Copper/Manganese/ Seleni/Zn 1 ml/ Insulin Human Regular 30 unit/ Total 

Parenteral Nutrition/Amino Acids/Dextrose/ Fat Emulsion Intravenous 1,920 ml @  

80 mls/hr TPN  CONT IV  Last administered on 6/7/20at 22:23;  Start 6/7/20 at 

22:00;  Stop 6/8/20 at 21:59;  Status DC


Meropenem 1 gm/ Sodium Chloride 100 ml @  200 mls/hr Q8HRS IV ;  Start 6/7/20 at

14:00;  Status Cancel


Meropenem 1 gm/ Sodium Chloride 100 ml @  200 mls/hr Q8HRS IV  Last administered

on 6/7/20at 11:04;  Start 6/7/20 at 10:00;  Stop 6/7/20 at 13:00;  Status DC


Meropenem 1 gm/ Sodium Chloride 100 ml @  200 mls/hr Q12HR IV  Last administered

on 6/15/20at 08:31;  Start 6/7/20 at 21:00


Sodium Chloride 1,000 ml @  1,000 mls/hr 1X  ONCE IV  Last administered on 

6/7/20at 11:06;  Start 6/7/20 at 10:45;  Stop 6/7/20 at 11:44;  Status DC


Micafungin Sodium 100 mg/Dextrose 100 ml @  100 mls/hr Q24H IV  Last 

administered on 6/14/20at 11:10;  Start 6/7/20 at 11:00


Daptomycin 410 mg/ Sodium Chloride 50 ml @  100 mls/hr Q24H IV  Last 

administered on 6/9/20at 13:33;  Start 6/7/20 at 14:00;  Stop 6/10/20 at 08:30; 

Status DC


Midazolam HCl (Versed) 2 mg STK-MED ONCE .ROUTE ;  Start 6/7/20 at 14:47;  Stop 

6/7/20 at 14:48;  Status DC


Fentanyl Citrate (Fentanyl 2ml Vial) 100 mcg STK-MED ONCE .ROUTE ;  Start 6/7/20

at 14:47;  Stop 6/7/20 at 14:48;  Status DC


Flumazenil (Romazicon) 0.5 mg STK-MED ONCE IV ;  Start 6/7/20 at 14:48;  Stop 

6/7/20 at 14:48;  Status DC


Naloxone HCl (Narcan) 0.4 mg STK-MED ONCE .ROUTE ;  Start 6/7/20 at 14:48;  Stop

6/7/20 at 14:48;  Status DC


Lidocaine HCl (Lidocaine 1% 20ml Vial) 20 ml STK-MED ONCE .ROUTE ;  Start 6/7/20

at 14:48;  Stop 6/7/20 at 14:48;  Status DC


Midazolam HCl (Versed) 2 mg 1X  ONCE IV  Last administered on 6/7/20at 15:28;  

Start 6/7/20 at 15:00;  Stop 6/7/20 at 15:01;  Status DC


Fentanyl Citrate (Fentanyl 2ml Vial) 100 mcg 1X  ONCE IV  Last administered on 

6/7/20at 15:28;  Start 6/7/20 at 15:00;  Stop 6/7/20 at 15:01;  Status DC


Lidocaine HCl (Lidocaine 1% 20ml Vial) 20 ml 1X  ONCE INJ  Last administered on 

6/7/20at 15:30;  Start 6/7/20 at 15:00;  Stop 6/7/20 at 15:01;  Status DC


Sodium Chloride 1,000 ml @  100 mls/hr Q10H IV  Last administered on 6/15/20at 

03:38;  Start 6/7/20 at 20:00


Sodium Bicarbonate (Sodium Bicarb Adult 8.4% Syr) 50 meq 1X  ONCE IV  Last 

administered on 6/7/20at 21:47;  Start 6/7/20 at 22:00;  Stop 6/7/20 at 22:01;  

Status DC


Potassium Acetate 40 meq/Magnesium Sulfate 5 meq/ Multivitamins 10 ml/Chromium/ 

Copper/Manganese/ Seleni/Zn 1 ml/ Insulin Human Regular 30 unit/ Total 

Parenteral Nutrition/Amino Acids/Dextrose/ Fat Emulsion Intravenous 1,920 ml @  

80 mls/hr TPN  CONT IV  Last administered on 6/8/20at 22:28;  Start 6/8/20 at 

22:00;  Stop 6/9/20 at 21:59;  Status DC


Sodium Chloride 500 ml @  500 mls/hr 1X  ONCE IV  Last administered on 6/9/20at 

06:39;  Start 6/9/20 at 06:45;  Stop 6/9/20 at 07:44;  Status DC


Potassium Acetate 40 meq/Magnesium Sulfate 5 meq/ Multivitamins 10 ml/Chromium/ 

Copper/Manganese/ Seleni/Zn 1 ml/ Insulin Human Regular 30 unit/ Total 

Parenteral Nutrition/Amino Acids/Dextrose/ Fat Emulsion Intravenous 1,920 ml @  

80 mls/hr TPN  CONT IV  Last administered on 6/9/20at 22:03;  Start 6/9/20 at 

22:00;  Stop 6/10/20 at 21:59;  Status DC


Metoprolol Tartrate (Lopressor Vial) 5 mg PRN Q6HRS  PRN IVP HYPERTENSION Last 

administered on 6/13/20at 04:03;  Start 6/10/20 at 09:00


Potassium Acetate 40 meq/Magnesium Sulfate 5 meq/ Multivitamins 10 ml/Chromium/ 

Copper/Manganese/ Seleni/Zn 1 ml/ Insulin Human Regular 30 unit/ Total 

Parenteral Nutrition/Amino Acids/Dextrose/ Fat Emulsion Intravenous 1,920 ml @  

80 mls/hr TPN  CONT IV  Last administered on 6/10/20at 21:26;  Start 6/10/20 at 

22:00;  Stop 6/11/20 at 21:59;  Status DC


Potassium Acetate 40 meq/Magnesium Sulfate 5 meq/ Multivitamins 10 ml/Chromium/ 

Copper/Manganese/ Seleni/Zn 1 ml/ Insulin Human Regular 30 unit/ Total 

Parenteral Nutrition/Amino Acids/Dextrose/ Fat Emulsion Intravenous 1,920 ml @  

80 mls/hr TPN  CONT IV  Last administered on 6/11/20at 23:23;  Start 6/11/20 at 

22:00;  Stop 6/12/20 at 21:59;  Status DC


Potassium Acetate 40 meq/Magnesium Sulfate 5 meq/ Multivitamins 10 ml/Chromium/ 

Copper/Manganese/ Seleni/Zn 1 ml/ Insulin Human Regular 30 unit/ Total Pa

renteral Nutrition/Amino Acids/Dextrose/ Fat Emulsion Intravenous 1,920 ml @  80

mls/hr TPN  CONT IV  Last administered on 6/12/20at 21:35;  Start 6/12/20 at 

22:00;  Stop 6/13/20 at 21:59;  Status DC


Furosemide (Lasix) 20 mg 1X  ONCE IVP  Last administered on 6/13/20at 06:26;  

Start 6/13/20 at 06:15;  Stop 6/13/20 at 06:16;  Status DC


Methylprednisolone Sodium Succinate (SOLU-Medrol 125MG VIAL) 125 mg 1X  ONCE IV 

Last administered on 6/13/20at 06:26;  Start 6/13/20 at 06:15;  Stop 6/13/20 at 

06:16;  Status DC


Albuterol/ Ipratropium (Duoneb) 3 ml Q4HRS NEB  Last administered on 6/15/20at 

07:53;  Start 6/13/20 at 08:00


Fentanyl Citrate 30 ml @ 0 mls/hr CONT  PRN IV SEE PROTOCOL Last administered on

6/14/20at 22:19;  Start 6/13/20 at 06:00


Propofol 100 ml @ 0 mls/hr CONT  PRN IV SEE PROTOCOL Last administered on 

6/14/20at 11:09;  Start 6/13/20 at 06:00


Fentanyl Citrate (Fentanyl 2ml Vial) 25 mcg PRN Q1HR  PRN IV SEE COMMENTS;  

Start 6/13/20 at 06:00


Fentanyl Citrate (Fentanyl 2ml Vial) 50 mcg PRN Q1HR  PRN IV SEE COMMENTS;  

Start 6/13/20 at 06:00


Chlorhexidine Gluconate (Peridex) 15 ml BID MM ;  Start 6/13/20 at 09:00;  Stop 

6/13/20 at 07:58;  Status DC


Potassium Acetate 40 meq/Magnesium Sulfate 5 meq/ Multivitamins 10 ml/Chromium/ 

Copper/Manganese/ Seleni/Zn 1 ml/ Insulin Human Regular 30 unit/ Total 

Parenteral Nutrition/Amino Acids/Dextrose/ Fat Emulsion Intravenous 1,920 ml @  

80 mls/hr TPN  CONT IV  Last administered on 6/13/20at 21:19;  Start 6/13/20 at 

22:00;  Stop 6/14/20 at 21:59;  Status DC


Acetylcysteine (Mucomyst 20% Resp Treatment) 600 mg BID NEB  Last administered 

on 6/15/20at 07:53;  Start 6/13/20 at 21:00


Magnesium Sulfate 100 ml @  25 mls/hr 1X  ONCE IV  Last administered on 

6/13/20at 15:48;  Start 6/13/20 at 15:45;  Stop 6/13/20 at 19:44;  Status DC


Potassium Acetate 40 meq/Magnesium Sulfate 5 meq/ Multivitamins 10 ml/Chromium/ 

Copper/Manganese/ Seleni/Zn 1 ml/ Insulin Human Regular 30 unit/ Total 

Parenteral Nutrition/Amino Acids/Dextrose/ Fat Emulsion Intravenous 1,920 ml @  

80 mls/hr TPN  CONT IV  Last administered on 6/14/20at 21:35;  Start 6/14/20 at 

22:00;  Stop 6/15/20 at 21:59


Potassium Chloride/Water 100 ml @  100 mls/hr Q1H IV  Last administered on 

6/15/20at 08:31;  Start 6/15/20 at 07:00;  Stop 6/15/20 at 08:59





Active Scripts


Active


Reported


Bisoprolol Fumarate 5 Mg Tablet 10 Mg PO DAILY


Vitals/I & O





Vital Sign - Last 24 Hours








 6/14/20 6/14/20 6/14/20 6/14/20





 09:00 09:45 10:00 11:00


 


Pulse 69  67 71


 


Resp 20  20 20


 


B/P (MAP) 119/60 (79)  133/74 (93) 117/63 (81)


 


Pulse Ox 99 100 99 100


 


O2 Delivery Ventilator Ventilator Ventilator Ventilator





 6/14/20 6/14/20 6/14/20 6/14/20





 11:34 12:00 12:00 12:00


 


Temp  97.5  





  97.5  


 


Pulse  80  72


 


Resp  20  


 


B/P (MAP)  134/70 (91)  


 


Pulse Ox 100 100  


 


O2 Delivery Ventilator Ventilator Mechanical Ventilator 


 


    





    





 6/14/20 6/14/20 6/14/20 6/14/20





 13:00 13:08 14:00 15:00


 


Pulse 64  82 80


 


Resp 20  20 20


 


B/P (MAP) 160/84 (109)  138/79 (98) 152/78 (102)


 


Pulse Ox 100 100 99 99


 


O2 Delivery Ventilator Ventilator Ventilator Ventilator





 6/14/20 6/14/20 6/14/20 6/14/20





 16:00 16:00 16:00 16:27


 


Temp 97.5   





 97.5   


 


Pulse 77 77  


 


Resp 20   


 


B/P (MAP) 126/68 (87)   


 


Pulse Ox 99   100


 


O2 Delivery Ventilator  Mechanical Ventilator Ventilator


 


    





    





 6/14/20 6/14/20 6/14/20 6/14/20





 17:00 17:55 18:00 19:00


 


Pulse 81  79 79


 


Resp 20  20 20


 


B/P (MAP) 144/75 (98)  130/70 (90) 144/75 (98)


 


Pulse Ox 100 100 99 99


 


O2 Delivery Ventilator Ventilator Ventilator Ventilator





 6/14/20 6/14/20 6/14/20 6/14/20





 20:00 20:00 20:00 20:15


 


Temp   97.3 





   97.3 


 


Pulse 94  94 


 


Resp   24 


 


B/P (MAP)   130/98 (109) 


 


Pulse Ox   99 100


 


O2 Delivery  Mechanical Ventilator Ventilator Ventilator


 


    





    





 6/14/20 6/14/20 6/14/20 6/14/20





 21:00 22:00 22:19 22:49


 


Pulse 88 92  


 


Resp 20 20 20 20


 


B/P (MAP) 126/67 (86) 154/78 (103)  


 


Pulse Ox 100 100 100 100


 


O2 Delivery Ventilator Ventilator Ventilator Ventilator





 6/14/20 6/14/20 6/15/20 6/15/20





 23:00 23:42 00:00 00:00


 


Pulse 87   89


 


Resp 20   


 


B/P (MAP) 145/77 (99)   


 


Pulse Ox 100 100  


 


O2 Delivery Ventilator Ventilator Mechanical Ventilator 





 6/15/20 6/15/20 6/15/20 6/15/20





 00:01 01:00 02:00 03:00


 


Temp 98.2   





 98.2   


 


Pulse 89 88 85 88


 


Resp 20 20 20 20


 


B/P (MAP) 154/72 (99) 143/72 (95) 165/75 (105) 159/79 (105)


 


Pulse Ox 100 100 100 100


 


O2 Delivery Ventilator Ventilator Ventilator Ventilator


 


    





    





 6/15/20 6/15/20 6/15/20 6/15/20





 03:37 04:00 04:00 04:00


 


Temp   98.3 





   98.3 


 


Pulse  95 95 


 


Resp   20 


 


B/P (MAP)   142/68 (92) 


 


Pulse Ox 100  100 


 


O2 Delivery Ventilator  Ventilator Mechanical Ventilator


 


    





    





 6/15/20 6/15/20 6/15/20 6/15/20





 05:00 06:00 07:00 07:34


 


Pulse 93 97 90 


 


Resp 20 20 20 


 


B/P (MAP) 134/65 (88) 144/71 (95) 145/69 (94) 


 


Pulse Ox 99 99 99 


 


O2 Delivery Ventilator Ventilator Ventilator Mechanical Ventilator





 6/15/20 6/15/20 6/15/20 





 07:54 08:00 08:00 


 


Temp  98.5  





  98.5  


 


Pulse  98  


 


Resp  20  


 


B/P (MAP)  140/75 (96)  


 


Pulse Ox 99 99  


 


O2 Delivery Ventilator Ventilator  














Intake and Output  0 


 


 6/14/20 6/14/20 6/15/20





 15:00 23:00 07:00


 


Intake Total  1881.81 ml 1471.6 ml


 


Output Total 776 ml 505 ml 580 ml


 


Balance -776 ml 1376.81 ml 891.6 ml











Justicifation of Admission Dx:


Justifications for Admission:


Justification of Admission Dx:  Yes











CLEMENTINA PANTOJA MD           Quintin 15, 2020 08:42

## 2020-06-15 NOTE — NUR
Patient's drains were assessed at 0800, noticed that old ROBERT site on left side was oozing a 
milky/sanguinous fluid. Notified and showed multiple physicians. Also notified them of the 
odor of the middle ROBERT drainage. After that change and patient vomiting, orders received to 
do a CT chest/abd/pelvis. Patient to CT without complications, awaiting results.

## 2020-06-15 NOTE — NUR
Patient's Propofol infusion restarted for comfort/sedation during chest tube placement 
procedure in CT 2. 1mg bolus given at start of procedure and infusion running at 2.4 
cc/hour. Patient tolerated procedure and plan of care communicated with Lilo CALL in ICU.

## 2020-06-15 NOTE — NUR
CT results given to Dr. Castano and Dr. Carter, IR was consulted for a thoracentesis with 
possible chest tube placement. Notified PAT Gonzalez of slowing ROBERT drainage despite fluid on 
CT, was asked to ask IR physician to look at the ROBERT drains to see if positioning needs 
adjusted or if TPA is required. Notified PAUL Kearney, who will ask the IR physician to 
look at the CT. Consent was given by sy Campos, via telephone contact with two RN 
verification. Will continue to monitor.

## 2020-06-15 NOTE — PDOC
Subjective:


Subjective:


Waves hello.





Objective:


Objective:


D/w nurse, reviewed chart.


Nurse says one drain "smells like bowel."


Vital Signs:





                                   Vital Signs








  Date Time  Temp Pulse Resp B/P (MAP) Pulse Ox O2 Delivery O2 Flow Rate FiO2


 


6/15/20 08:00        


 


6/15/20 08:00 98.5 98 20  99 Ventilator  





 98.5       








Labs:





Laboratory Tests








Test


 6/14/20


10:45 6/14/20


12:11 6/14/20


17:52 6/15/20


00:23


 


O2 Saturation 98 %    


 


Arterial Blood pH 7.45    


 


Arterial Blood pCO2 at


Patient Temp 34 mmHg 


 


 


 





 


Arterial Blood pO2 at Patient


Temp 146 mmHg 


 


 


 





 


Arterial Blood HCO3 23 mmol/L    


 


Arterial Blood Base Excess -1 mmol/L    


 


FiO2 40    


 


Glucose (Fingerstick)  159 mg/dL  129 mg/dL  118 mg/dL 


 


Test


 6/15/20


05:45 6/15/20


05:54 6/15/20


08:10 





 


White Blood Count 8.1 x10^3/uL    


 


Red Blood Count 3.28 x10^6/uL    


 


Hemoglobin 9.3 g/dL    


 


Hematocrit 27.8 %    


 


Mean Corpuscular Volume 85 fL    


 


Mean Corpuscular Hemoglobin 28 pg    


 


Mean Corpuscular Hemoglobin


Concent 34 g/dL 


 


 


 





 


Red Cell Distribution Width 18.6 %    


 


Platelet Count 434 x10^3/uL    


 


Neutrophils (%) (Auto) 80 %    


 


Lymphocytes (%) (Auto) 15 %    


 


Monocytes (%) (Auto) 5 %    


 


Eosinophils (%) (Auto) 1 %    


 


Basophils (%) (Auto) 0 %    


 


Neutrophils # (Auto) 6.4 x10^3/uL    


 


Lymphocytes # (Auto) 1.2 x10^3/uL    


 


Monocytes # (Auto) 0.4 x10^3/uL    


 


Eosinophils # (Auto) 0.1 x10^3/uL    


 


Basophils # (Auto) 0.0 x10^3/uL    


 


Sodium Level 143 mmol/L    


 


Potassium Level 3.3 mmol/L    


 


Chloride Level 111 mmol/L    


 


Carbon Dioxide Level 23 mmol/L    


 


Anion Gap 9    


 


Blood Urea Nitrogen 34 mg/dL    


 


Creatinine 0.7 mg/dL    


 


Estimated GFR


(Cockcroft-Gault) 88.9 


 


 


 





 


BUN/Creatinine Ratio 49    


 


Glucose Level 125 mg/dL    


 


Calcium Level 8.8 mg/dL    


 


Magnesium Level 1.8 mg/dL    


 


Total Bilirubin 0.5 mg/dL    


 


Aspartate Amino Transf


(AST/SGOT) 62 U/L 


 


 


 





 


Alanine Aminotransferase


(ALT/SGPT) 72 U/L 


 


 


 





 


Alkaline Phosphatase 132 U/L    


 


Total Protein 4.5 g/dL    


 


Albumin 1.1 g/dL    


 


Albumin/Globulin Ratio 0.3    


 


Glucose (Fingerstick)  115 mg/dL   


 


O2 Saturation   97 %  


 


Arterial Blood pH   7.46  


 


Arterial Blood pCO2 at


Patient Temp 


 


 31 mmHg 


 





 


Arterial Blood pO2 at Patient


Temp 


 


 117 mmHg 


 





 


Arterial Blood HCO3   22 mmol/L  


 


Arterial Blood Base Excess   -2 mmol/L  


 


FiO2   35  





ORDERED:  ANAER/AEROB/GS                                                        

 


COMMENTS: ABDOMINAL ABSCESS                                           


          Has specimen been collected/obtained? Y


---------------------------------------------------------------------------

-----------------





  Procedure                         Result                                      

         


------------------

--------------------------------------------------------------------------





  GRAM STAIN  Final  


        Final





        GRAM NEGATIVE RODS:MODERATE


        SQUAMOUS EPI CELL:NOT APPLICABLE


        PMN (WBCs):RARE


        YEAST:MODERATE


        Unless otherwise specified, Testing Performed by:


        [address] [address]


        For Inquires, the Physician may contact the Microbiology


        department at [phone]





  ANAEROBIC-AEROBIC CULTURE  Final  


        Final





        MANY GRAM NEGATIVE RODS on 06/09/20 at 1158


        FINAL ID= [[email or web address] [id number]]


        NO ANAEROBIC ORGANISMS ISOLATED on 06/13/20 at 0942


        MICRO CHARGES


        MICRO CHARGES


        PSEUDOMONAS AERUGINOSA





  ANTIMICROBIAL SUSCEPTIBILITY  Final  


        Comment





        NEG ANSON 56


        PSEUDOMONAS AERUGINOSA


        ANTIBIOTIC                        RESULT          INTERPRETATION


        AMIKACIN                          <=16                  S


        AZTREONAM                         >16                   R


        CEFTAZIDIME                       >16                   R


        CIPROFLOXACIN                     <=0.25                S








  ANTIMICROBIAL SUSCEPTIBILITY  Final   (continued)


        CEFEPIME                          16                    I


        CEFTAZIDIME/AVIBACTAM             <=4                   S


        GENTAMICIN                        <=2                   S


        LEVOFLOXACIN                      <=0.5                 S


        MEROPENEM                         <=1                   S


        PIPERACILLIN/TAZOBACTAM           64                    S


        TOBRAMYCIN                        <=2                   S


        Unless otherwise specified, Testing Performed by:


        [address] [address]


        For Inquires, the Physician may contact the Microbiology


        department at [phone]








ORDERED:  RESP CULTURE                                                          

 


COMMENTS: Has specimen been collected/obtained? Y                     


-----------------------------------

---------------------------------------------------------





  Procedure                         Result                                      

         


 

--------------------------------------------------------------------------------


------------





  GRAM STAIN EVALUATION  Final  


        Final





        GRAM NEG COCCOBACILLI:MANY


        SQUAMOUS EPI CELL:RARE


        PMN (WBCs):FEW


        Unless otherwise specified, Testing Performed by:


        [address] [address]


        For Inquires, the Physician may contact the Microbiology


        department at [phone]





  RESPIRATORY CULTURE  


                                PENDING








  BLOOD CULTURE  Final  


        NO GROWTH AFTER 5 DAYS


Imaging:


C/A/P CT 6/15


pending





CXR 6/14


IMPRESSION:


Bilateral pleural effusions and associated atelectasis with apparent slight 

decrease in left pleural effusion and improving right middle lobe 

atelectasis/consolidation.





PE:





GEN: ill


LUNGS: diminished, vent


HEART: RRR


ABD: drains w/ darker brownish material, some black specks, also brownish draina

ge from previous drain site on right


NEURO/PSYCH: awake and alert





A/P:


Complicated pancreatitis





--


Different drainage now and since I have seen, vomited and CT pending.





Justicifation of Admission Dx:


Justifications for Admission:


Justification of Admission Dx:  Yes











RAGHAVENDRA MURCIA         Quitnin 15, 2020 09:42

## 2020-06-15 NOTE — NUR
Patient arrived in room from IR accompanied by IR RN and tech. Patient remains alert and 
oriented, in a small amount of pain from the chest tube which is draining orange/serous 
fluids. Updated patient's daughter, Beth, of chest tube placement. Rolf at bedside with 
the patient.

## 2020-06-15 NOTE — PDOC
PULMONARY PROGRESS NOTES


Subjective


back on vent 6/13, now on peep 5, fi02 35%, large secretion, , open eyes w 

verbal stimuli


Patient intubated on 3/23 , s/p trach 4/6, 


transferred back to ICU 6/5 for syncope with collapse


developed anemia, blood drainage from RLQ abdomen drain site, and surrounding 

firmness  / developed septic shock 6/7 from abdomen source, required levo 6/7


s/p 3 new drains 6/7 with brown color drainage


was place on AVAPS 6/7 post  


continues to have Dark blood / bloody drainage from ROBERT drains, 1500 cc in past 

24 hrs . off pressor


Vitals





Vital Signs








  Date Time  Temp Pulse Resp B/P (MAP) Pulse Ox O2 Delivery O2 Flow Rate FiO2


 


6/15/20 10:00  113 20 126/70 (88) 100 Ventilator  


 


6/15/20 08:00 98.5       





 98.5       








Comments


awake, nods yes and no to questions


General:  Alert


HEENT:  Other (nc at perrl   nose clear    neck  trach site ok   no lab  no 

thyromegaly)


Lungs:  Other (diminshed in bases, Rhonci in LLL)


Cardiovascular:  S1, S2


Abdomen:  Soft, Non-tender, Other (bleeding from Sx. site RLQ and firmness)


Extremities:  Other (+1 BLE edema)


Skin:  Warm, Dry


Labs





Laboratory Tests








Test


 6/13/20


11:24 6/13/20


15:08 6/13/20


18:19 6/13/20


20:10


 


Glucose (Fingerstick)


 187 mg/dL


(70-99) 


 220 mg/dL


(70-99) 





 


White Blood Count


 


 9.3 x10^3/uL


(4.0-11.0) 


 8.4 x10^3/uL


(4.0-11.0)


 


Red Blood Count


 


 2.78 x10^6/uL


(3.50-5.40) 


 3.04 x10^6/uL


(3.50-5.40)


 


Hemoglobin


 


 8.1 g/dL


(12.0-15.5) 


 9.0 g/dL


(12.0-15.5)


 


Hematocrit


 


 23.5 %


(36.0-47.0) 


 26.0 %


(36.0-47.0)


 


Mean Corpuscular Volume  85 fL ()   86 fL () 


 


Mean Corpuscular Hemoglobin  29 pg (25-35)   30 pg (25-35) 


 


Mean Corpuscular Hemoglobin


Concent 


 34 g/dL


(31-37) 


 35 g/dL


(31-37)


 


Red Cell Distribution Width


 


 19.1 %


(11.5-14.5) 


 18.7 %


(11.5-14.5)


 


Platelet Count


 


 380 x10^3/uL


(140-400) 


 388 x10^3/uL


(140-400)


 


Magnesium Level


 


 1.5 mg/dL


(1.8-2.4) 


 





 


Test


 6/14/20


00:38 6/14/20


05:30 6/14/20


10:45 6/14/20


12:11


 


Glucose (Fingerstick)


 188 mg/dL


(70-99) 


 


 159 mg/dL


(70-99)


 


White Blood Count


 


 7.5 x10^3/uL


(4.0-11.0) 


 





 


Red Blood Count


 


 2.95 x10^6/uL


(3.50-5.40) 


 





 


Hemoglobin


 


 8.6 g/dL


(12.0-15.5) 


 





 


Hematocrit


 


 25.0 %


(36.0-47.0) 


 





 


Mean Corpuscular Volume  85 fL ()   


 


Mean Corpuscular Hemoglobin  29 pg (25-35)   


 


Mean Corpuscular Hemoglobin


Concent 


 34 g/dL


(31-37) 


 





 


Red Cell Distribution Width


 


 18.0 %


(11.5-14.5) 


 





 


Platelet Count


 


 382 x10^3/uL


(140-400) 


 





 


Neutrophils (%) (Auto)  79 % (31-73)   


 


Lymphocytes (%) (Auto)  17 % (24-48)   


 


Monocytes (%) (Auto)  4 % (0-9)   


 


Eosinophils (%) (Auto)  0 % (0-3)   


 


Basophils (%) (Auto)  0 % (0-3)   


 


Neutrophils # (Auto)


 


 5.9 x10^3/uL


(1.8-7.7) 


 





 


Lymphocytes # (Auto)


 


 1.3 x10^3/uL


(1.0-4.8) 


 





 


Monocytes # (Auto)


 


 0.3 x10^3/uL


(0.0-1.1) 


 





 


Eosinophils # (Auto)


 


 0.0 x10^3/uL


(0.0-0.7) 


 





 


Basophils # (Auto)


 


 0.0 x10^3/uL


(0.0-0.2) 


 





 


Sodium Level


 


 145 mmol/L


(136-145) 


 





 


Potassium Level


 


 3.8 mmol/L


(3.5-5.1) 


 





 


Chloride Level


 


 113 mmol/L


() 


 





 


Carbon Dioxide Level


 


 24 mmol/L


(21-32) 


 





 


Anion Gap  8 (6-14)   


 


Blood Urea Nitrogen


 


 36 mg/dL


(7-20) 


 





 


Creatinine


 


 0.8 mg/dL


(0.6-1.0) 


 





 


Estimated GFR


(Cockcroft-Gault) 


 76.2 


 


 





 


BUN/Creatinine Ratio  45 (6-20)   


 


Glucose Level


 


 227 mg/dL


(70-99) 


 





 


Calcium Level


 


 9.2 mg/dL


(8.5-10.1) 


 





 


Phosphorus Level


 


 3.7 mg/dL


(2.6-4.7) 


 





 


Magnesium Level


 


 2.5 mg/dL


(1.8-2.4) 


 





 


Total Bilirubin


 


 0.4 mg/dL


(0.2-1.0) 


 





 


Aspartate Amino Transf


(AST/SGOT) 


 31 U/L (15-37) 


 


 





 


Alanine Aminotransferase


(ALT/SGPT) 


 29 U/L (14-59) 


 


 





 


Alkaline Phosphatase


 


 82 U/L


() 


 





 


Total Protein


 


 4.6 g/dL


(6.4-8.2) 


 





 


Albumin


 


 1.0 g/dL


(3.4-5.0) 


 





 


Albumin/Globulin Ratio  0.3 (1.0-1.7)   


 


O2 Saturation   98 % (92-99)  


 


Arterial Blood pH


 


 


 7.45


(7.35-7.45) 





 


Arterial Blood pCO2 at


Patient Temp 


 


 34 mmHg


(35-46) 





 


Arterial Blood pO2 at Patient


Temp 


 


 146 mmHg


() 





 


Arterial Blood HCO3


 


 


 23 mmol/L


(21-28) 





 


Arterial Blood Base Excess


 


 


 -1 mmol/L


(-3-3) 





 


FiO2   40  


 


Test


 6/14/20


17:52 6/15/20


00:23 6/15/20


05:45 6/15/20


05:54


 


Glucose (Fingerstick)


 129 mg/dL


(70-99) 118 mg/dL


(70-99) 


 115 mg/dL


(70-99)


 


White Blood Count


 


 


 8.1 x10^3/uL


(4.0-11.0) 





 


Red Blood Count


 


 


 3.28 x10^6/uL


(3.50-5.40) 





 


Hemoglobin


 


 


 9.3 g/dL


(12.0-15.5) 





 


Hematocrit


 


 


 27.8 %


(36.0-47.0) 





 


Mean Corpuscular Volume   85 fL ()  


 


Mean Corpuscular Hemoglobin   28 pg (25-35)  


 


Mean Corpuscular Hemoglobin


Concent 


 


 34 g/dL


(31-37) 





 


Red Cell Distribution Width


 


 


 18.6 %


(11.5-14.5) 





 


Platelet Count


 


 


 434 x10^3/uL


(140-400) 





 


Neutrophils (%) (Auto)   80 % (31-73)  


 


Lymphocytes (%) (Auto)   15 % (24-48)  


 


Monocytes (%) (Auto)   5 % (0-9)  


 


Eosinophils (%) (Auto)   1 % (0-3)  


 


Basophils (%) (Auto)   0 % (0-3)  


 


Neutrophils # (Auto)


 


 


 6.4 x10^3/uL


(1.8-7.7) 





 


Lymphocytes # (Auto)


 


 


 1.2 x10^3/uL


(1.0-4.8) 





 


Monocytes # (Auto)


 


 


 0.4 x10^3/uL


(0.0-1.1) 





 


Eosinophils # (Auto)


 


 


 0.1 x10^3/uL


(0.0-0.7) 





 


Basophils # (Auto)


 


 


 0.0 x10^3/uL


(0.0-0.2) 





 


Sodium Level


 


 


 143 mmol/L


(136-145) 





 


Potassium Level


 


 


 3.3 mmol/L


(3.5-5.1) 





 


Chloride Level


 


 


 111 mmol/L


() 





 


Carbon Dioxide Level


 


 


 23 mmol/L


(21-32) 





 


Anion Gap   9 (6-14)  


 


Blood Urea Nitrogen


 


 


 34 mg/dL


(7-20) 





 


Creatinine


 


 


 0.7 mg/dL


(0.6-1.0) 





 


Estimated GFR


(Cockcroft-Gault) 


 


 88.9 


 





 


BUN/Creatinine Ratio   49 (6-20)  


 


Glucose Level


 


 


 125 mg/dL


(70-99) 





 


Calcium Level


 


 


 8.8 mg/dL


(8.5-10.1) 





 


Magnesium Level


 


 


 1.8 mg/dL


(1.8-2.4) 





 


Total Bilirubin


 


 


 0.5 mg/dL


(0.2-1.0) 





 


Aspartate Amino Transf


(AST/SGOT) 


 


 62 U/L (15-37) 


 





 


Alanine Aminotransferase


(ALT/SGPT) 


 


 72 U/L (14-59) 


 





 


Alkaline Phosphatase


 


 


 132 U/L


() 





 


Total Protein


 


 


 4.5 g/dL


(6.4-8.2) 





 


Albumin


 


 


 1.1 g/dL


(3.4-5.0) 





 


Albumin/Globulin Ratio   0.3 (1.0-1.7)  


 


Test


 6/15/20


08:10 


 


 





 


O2 Saturation 97 % (92-99)    


 


Arterial Blood pH


 7.46


(7.35-7.45) 


 


 





 


Arterial Blood pCO2 at


Patient Temp 31 mmHg


(35-46) 


 


 





 


Arterial Blood pO2 at Patient


Temp 117 mmHg


() 


 


 





 


Arterial Blood HCO3


 22 mmol/L


(21-28) 


 


 





 


Arterial Blood Base Excess


 -2 mmol/L


(-3-3) 


 


 





 


FiO2 35    








Laboratory Tests








Test


 6/14/20


12:11 6/14/20


17:52 6/15/20


00:23 6/15/20


05:45


 


Glucose (Fingerstick)


 159 mg/dL


(70-99) 129 mg/dL


(70-99) 118 mg/dL


(70-99) 





 


White Blood Count


 


 


 


 8.1 x10^3/uL


(4.0-11.0)


 


Red Blood Count


 


 


 


 3.28 x10^6/uL


(3.50-5.40)


 


Hemoglobin


 


 


 


 9.3 g/dL


(12.0-15.5)


 


Hematocrit


 


 


 


 27.8 %


(36.0-47.0)


 


Mean Corpuscular Volume    85 fL () 


 


Mean Corpuscular Hemoglobin    28 pg (25-35) 


 


Mean Corpuscular Hemoglobin


Concent 


 


 


 34 g/dL


(31-37)


 


Red Cell Distribution Width


 


 


 


 18.6 %


(11.5-14.5)


 


Platelet Count


 


 


 


 434 x10^3/uL


(140-400)


 


Neutrophils (%) (Auto)    80 % (31-73) 


 


Lymphocytes (%) (Auto)    15 % (24-48) 


 


Monocytes (%) (Auto)    5 % (0-9) 


 


Eosinophils (%) (Auto)    1 % (0-3) 


 


Basophils (%) (Auto)    0 % (0-3) 


 


Neutrophils # (Auto)


 


 


 


 6.4 x10^3/uL


(1.8-7.7)


 


Lymphocytes # (Auto)


 


 


 


 1.2 x10^3/uL


(1.0-4.8)


 


Monocytes # (Auto)


 


 


 


 0.4 x10^3/uL


(0.0-1.1)


 


Eosinophils # (Auto)


 


 


 


 0.1 x10^3/uL


(0.0-0.7)


 


Basophils # (Auto)


 


 


 


 0.0 x10^3/uL


(0.0-0.2)


 


Sodium Level


 


 


 


 143 mmol/L


(136-145)


 


Potassium Level


 


 


 


 3.3 mmol/L


(3.5-5.1)


 


Chloride Level


 


 


 


 111 mmol/L


()


 


Carbon Dioxide Level


 


 


 


 23 mmol/L


(21-32)


 


Anion Gap    9 (6-14) 


 


Blood Urea Nitrogen


 


 


 


 34 mg/dL


(7-20)


 


Creatinine


 


 


 


 0.7 mg/dL


(0.6-1.0)


 


Estimated GFR


(Cockcroft-Gault) 


 


 


 88.9 





 


BUN/Creatinine Ratio    49 (6-20) 


 


Glucose Level


 


 


 


 125 mg/dL


(70-99)


 


Calcium Level


 


 


 


 8.8 mg/dL


(8.5-10.1)


 


Magnesium Level


 


 


 


 1.8 mg/dL


(1.8-2.4)


 


Total Bilirubin


 


 


 


 0.5 mg/dL


(0.2-1.0)


 


Aspartate Amino Transf


(AST/SGOT) 


 


 


 62 U/L (15-37) 





 


Alanine Aminotransferase


(ALT/SGPT) 


 


 


 72 U/L (14-59) 





 


Alkaline Phosphatase


 


 


 


 132 U/L


()


 


Total Protein


 


 


 


 4.5 g/dL


(6.4-8.2)


 


Albumin


 


 


 


 1.1 g/dL


(3.4-5.0)


 


Albumin/Globulin Ratio    0.3 (1.0-1.7) 


 


Test


 6/15/20


05:54 6/15/20


08:10 


 





 


Glucose (Fingerstick)


 115 mg/dL


(70-99) 


 


 





 


O2 Saturation  97 % (92-99)   


 


Arterial Blood pH


 


 7.46


(7.35-7.45) 


 





 


Arterial Blood pCO2 at


Patient Temp 


 31 mmHg


(35-46) 


 





 


Arterial Blood pO2 at Patient


Temp 


 117 mmHg


() 


 





 


Arterial Blood HCO3


 


 22 mmol/L


(21-28) 


 





 


Arterial Blood Base Excess


 


 -2 mmol/L


(-3-3) 


 





 


FiO2  35   








Medications





Active Scripts








 Medications  Dose


 Route/Sig


 Max Daily Dose Days Date Category


 


 Bisoprolol


 Fumarate 5 Mg


 Tablet  10 Mg


 PO DAILY


   3/16/20 Reported








Comments


CXR 6/14/2020


 


trach  infilt effusion atelectasis on l side improved


r effusion/atelectasis














ct abdomen /pelvis 6/6


1. Removal of the percutaneous pigtail drainage catheters since the prior 


exam. Sequela of pancreatitis with extensive pseudocysts again 


demonstrated, the right-sided collections are slightly larger since the 


prior exam, the left-sided collections are stable. See above.


2. Moderate to large left pleural effusion with atelectasis and collapse 


of most of the left lower lobe, stable. Small right pleural effusion is 


stable.


3. Gallstone.





ct chest 6/15 reviewed





Impression


.


IMPRESSION:


1.  Acute hypoxemic respiratory failure secondary to ARDS status post trach, 

developed anemia 6/7, blood drainage from RLQ abdomen drain site, and 

surrounding firmness  / developed septic shock 6/7 from abdomen source, required

levo 6/7


s/p 3 new drains 6/7 with brown color drainage, back on vent 6/13, Bloody 

drainage again from ROBERT drains / another 1500 cc in last 24 hr 


2.  Gallstone pancreatitis, now with ongoing bleeding from prior drain. Anemic. 

s/p Tx multiple units over several days


3.  septic shock/sepsis, recurrent 6/7, source abdomen. , off levo now, 


4.  Acute kidney injury-, Off HD--renal function decling. suspect JED on CKD due

to hypotension , improved now


5.  Acute gallstone pancreatitis.


6.  Hypoalbuminemia.


7.  Moderate persistent effusions, s/p left thora  5/12, reaccumulation of left 

effusion. O2 requirement not changed. 


8.  Fever- ,hypotension. suspect recurrent sepsis/ likely pancreatic source.  

Per ID, per surgery--


9.  Chronic anemia-- ongoing / s/p PRBC


10. Covid 19 testing negative


11. Moderate to large ascites-S/P paracentisis


12.S/P paracentisis with 4 liters removed on 4/15/20


13. S/P IR drain placement on 5/8/2020, removal, re inserted 6/7


14. Depression/Anxiety 


15. Increase left effusion, ? loculated





Plan


.


1. back on vent 6/13, cxr reviewed, left moderate effusion/infilt/atelectasis, 

will plan for thoracentesis,  titrate fio2 to keep sat 94%, cont vent support. 

Try off vent and TS today


2. Follow all cultures/ PSA from pelvic drains. Abx per ID


3. Follow surgery recs-- Acute pancreatitis with persistent necrosis ---- 4/27 

status post ROBERT drain placement + C paropsilosis; 5/6 + yeast & high amylase; s/p

additional drains on 5/8, removal of drains and now increase pseudocyst and 

increase fluid collection. likely infected fluid/ sepsis. continues to have 

bloody drainage thru drains . 90 cc in last 24 hrs .Initiated BS abx.follow 

surgery rec, planning surgical intervention next few weeks


4. Follow ID recs for ABX----


5. Follow nephrology recs -----


6. Continue TPN 


7. monitor HGB,  4 units since 6/6--monitor HGB transfuse if less than 8.5 


8 s/p  thoracentesis, 5/12, 3 litres removed


9. Pt remains critically ill from severe necrotic pancreatis. Even with surgery 

prognosis grim. Surgery informed family.





     


DVT/GI PPX: protonix--- holding lovenox 2/2 anemia


PT/OT


D/W RN, rt and dr huffman, 


d/w ID


critically ill    cc time 30 min no overlap











SHARYN SOLORZANO MD                 Quintin 15, 2020 10:54

## 2020-06-15 NOTE — PDOC
PROGRESS NOTES


Chief Complaint


Chief Complaint


A/P:


Acute hypoxic Respiratory failure requiring mechanical ventilation (now 

extubated for several days but still with tracheostomy)


Tracheostomy


bilateral pleural effusions/pulm edema 


Sepsis


Severe Acute gallstone pancreatitis (not a surgical candidate at this time) with

necrosis


Acute kidney failure now requiring dialysis


Salpingitis


Gallstones (Calculus of gallbladder with acute cholecystitis without 

obstruction)


HTN 


Leukocytosis 


Hypoxia


Uterine fibroid


Intractable pain


Intractable nausea


Covid 19 negative. 


Acute on chronic anemia 


EEG: No seizure activityFever  - better currently - intermittent could be from 

underlying pancreatitis blood cults 5/4 - neg so far


? Ileus with vomiting


Abd distention - U/S and CT reviewed s/p 0.4 L of opaque, debris-containing 

ascites was removed 5/6


Acute pancreatitis with persistent necrosis


- 4/27 status post ROBERT drain placement + C paropsilosis. s/p additional drains 5 /8


Anemia - S/p PRBCs


Cholelithiasis with thickening of the gallbladder wall.


Leucocytosis improving


JED, hyperkalemia, Metabolic acidosis off dialysis


Acute hypoxic resp failure ,bilateral pleural effusion and atelectasis


hypocalcemia 


Prediabetes


HTN


s/p trach


ESRD on HD


Hyperglycemia





FEN - 


PPX - SCDs, off lovenox currently


FULL CODE


Dispo - ICU, critically ill


Throacentesis as per consultant.





History of Present Illness


History of Present Illness


6/8: IR placed drain on 6/7. 4u PRBC after Hb drop. Hb 8.8 today. Off Levophed 

this morning. T-max 100.3. Much more lethargic today. CXR with left sided 

diffuse infiltrates.


6/9: Tachycardic overnight into the 140s. NGT clamped. On BIPAP currently. 

Drains with serosanguinous discharge. WBC 8, Tmax 99.6F.


6/10: Seen on trach shield in ICU.  Hypertensive and tachycardic. Labs stable. 

blood stained drainage from drains. Afebrile.


6/11: Seen on trach shield in ICU. She is a bit confused, drowsy, but when 

sitting up is conversational and confusion somewhat clears. She is asking for 

more pain medication. Stable drains, still very tachy.Na 147


6/12: Patient vomited overnight. Aspirated. Tried to pull her trach out, she was

told she would die without her trach, she said "I know, I just want to go home".

Hb 7.6. Afebrile, still very tachycardic. 1055ml out of right sided ROBERT drain


6/13: Overnight hypoxic, on BIPAP. CXR with left sided white out lung. 

Significant mucous plug suctioned by RT with improvement in her ABG after 2 

hours this morning. Not really active, tired, lethargic. 890ml out of drains 

past 24 hours. On vent. D/w daughter bedside.


6/14: Still on vent overnight with copious pulmonary drainage on suctioning. 

Labs stable, UOP stable 1L. Drain output still significant with 1505mL. She has 

shown her phone password [phone] and made it clear she does not want her daughters 

to access her phone at this time.


6:15: Patient quite frail, she has had such a lengthy hospital stay that she is 

certainly depressed and as documented 3 days ago is wanting to go home. Most 

likely patient is also tired of being hospitalized with an end point no where in

sight. Reassurance and encouragement provided during my visit. Plans for 

thoracentesis later in the day 





6/5/2020


Patient seen and examined on telemetry floor today


This morning I had a couple of discussions with case management


The issue is the patient will not wear her trach cap


Without that she cannot advance her diet and is currently supposed to be on 

honey thick liquids


I was going to talk to the patient about wearing her trach But when I arrived to

the room she was lying on the floor with several nurses and therapist around


Apparently she did walk to the end of the garsia then back and then collapsed onto

the floor


She had a bowel movement when this all happened


Appears to be critically ill again


Very shaky tremulous anxious has a stare in her eyes


We got her back in bed


I am extremely concerned about her long-term prognosis again











6/4/2020


Patient seen and examined in the ICU


She remains critically ill is is extremely weak


Chart reviewed


Discussed with RN


We decided to try some Prozac as she does seem depressed


On IV TPN


Still has NG tube








6/3/2020


Patient seen and examined in the ICU


She remains critically ill


We have been trying to hold off on her Ativan for the past 24 hours


She is a little more awake but shaky and agitated and anxious seems depressed


Discussed with RN


Chart reviewed








6/2/2020


Patient seen and examined in the ICU


She is a little more alert today but still quite ill


Appears clammy and pale and depressed/anxious


Discussed with RN


Chart reviewed











6/1/2020


Patient seen and examined in the ICU


She appears extremely ill


She is tachypneic at 35 respirations per minute and tachycardic at 132 bpm


She is extremely encephalopathic and shaky


She appears clammy


Chart reviewed


Discussed with RN


Prognosis extremely guarded at best








5/31/2020


Patient still in ICU


Resting with no apparent distress


Chart reviewed








5/30/2020


Patient seen and examined in the ICU


She is wiping her face with a cough


Discussed with RN


Chart reviewed


We hope to get her out of the ICU later today if possible








5/29/2020


Patient seen and examined in the ICU once again


She is back on NG suction


On IV Zosyn


Has IV TPN


Sedated with Precedex but anxious still


Appears somewhat clammy and pale


Chart reviewed


Discussed with RN


She remains critically ill














5/28/2020


Patient seen and examined in the ICU


She has an NG that is clamped we are hoping to start some clear liquids but she 

looks quite ill


She is on IV TPN


Meropenem changed to IV Zosyn (agree)


She has [place] to bedside drainage


She is semi-sedated with Precedex


Chart reviewed


Discussed with RN


She remains critically ill











Seen bedside. Hb 8. She was just a bit hypoxic, had a mucous plug suctioned. 

Able to vocalize well with speaking valve, tells me we are being very hard on 

her and she would like to be drugged back to sleep.  She wants to wake up and 

feel better. On trach shield, 


T max 100.4. afebrile this a.m.





5/26/2020


Patient seen and examined in the ICU


She is up in the chair very frail trying to talk a little shaky


Discussed with RN


Chart reviewed








5/25/2020


Patient seen and examined in the ICU


Patient up in the chair


Having a severe coughing episode with a lot of phlegm coming out of her 

tracheostomy


Discussed with RN


Discussed with physical therapy


Chart reviewed











5/24,.    feels well,  has been out of room in wheelchair


no complaint,    still weak,   some with not wanting to wear her valve on trach


cont other,   may be able to work with speech tomorrow,    videoswallow OK to 

try, 








5/23,  anxiety is up today,   she dislikes the valve still,    


shower and outside today


.   





5/22 she doesnt want to wear her passy-kristan valve,  discussed str and plan with 

her.


speech following,  needs swallow study,   but needs to wear her valve longer,  


cont current


able to walk some, walker 





5/21  stronger,   better,   


we discussed better oral care


she would like to try swallow study,  wants to try to eat,    speech is follo

wing








5/20/2020 


She remains in the ICU


sitting up and working with OT,   getting better


if limit pain meds, may do better off the vent, 





Nurses trying to suction her,  that is also improved, 


Chart reviewed


 








5/19/2020


Patient seen and examined in the ICU


She had an episode yesterday of tachycardia and severe agitation we gave her 

some Ativan


After that she seemed to have stroke symptoms but now that the Ativan has wore 

off her stroke symptoms have resolved


 


 


She is on IV meropenem and daptomycin and micafungin


Chart reviewed


Discussed with RN


Patient is still critically ill


 


BRIEF OPERATIVE NOTE


Pre-Op Diagnosis


Pancreatitis with pseudocysts, suspected infection


Post-Op Diagnosis


same


Procedure Performed


CT abdominal Drains x 3


Surgeon


Hardy


Anesthesia Type:  Conscious Sedation


Findings


3 abdominal drains, 14F, with turbid pancreatic fluid and necrotic debris in e

ach.


Complications


No immediate








5/9: Patient today somewhat restless and having bilious secretions from ET tube,

imaging studies ordered, discussed with consultant. Pretty poor prognosis, 

hopefully is not a fistula, poor surgical candidate. 





5/10: Imaging with no acute events, she seems more stable today compared to 

yesterday. Encouraged as much activity as possible patient at high risk for 

severe depression.





Vitals


Vitals





Vital Signs








  Date Time  Temp Pulse Resp B/P (MAP) Pulse Ox O2 Delivery O2 Flow Rate FiO2


 


6/15/20 13:40  121 22 150/86 (107) 97 Tracheal Collar  


 


6/15/20 12:00 99.0       





 99.0       











Physical Exam


Physical Exam


GENERAL: More Alert and looks better


HEENT: NGT in place. Oral mucosa dry


NECK:  Tracheostomy 


LUNGS: Diminished aeration bases, no accessory muscle use 


HEART:  S1, S2, tachy, regular 


ABDOMEN: Mild distention, bowel sounds present, soft, grimaces to palpation 


Right lateral side drainage bag, 3 drains with bloodstained drainage.  Left side

with drainage from previous drain site


: [place] ( 6/ 7)


EXTREMITIES: Trace edema .no  cyanosis 


SKIN: no signs of gen rash 


CNS: Lethargic very weak


LUE-PICC (5/29) without signs of complications - art line without complications


General:  Cooperative


Heart:  Regular rate, Normal S1, Normal S2, No murmurs, Gallops


Lungs:  Other (diminshed in bases, Rhonci in LLL)


Abdomen:  Soft, Other (drains in place)


Extremities:  No clubbing, No cyanosis, No edema, Normal pulses, No 

tenderness/swelling


Skin:  Other (warm, dry)





Labs


LABS





Laboratory Tests








Test


 6/14/20


17:52 6/15/20


00:23 6/15/20


05:45 6/15/20


05:54


 


Glucose (Fingerstick)


 129 mg/dL


(70-99) 118 mg/dL


(70-99) 


 115 mg/dL


(70-99)


 


White Blood Count


 


 


 8.1 x10^3/uL


(4.0-11.0) 





 


Red Blood Count


 


 


 3.28 x10^6/uL


(3.50-5.40) 





 


Hemoglobin


 


 


 9.3 g/dL


(12.0-15.5) 





 


Hematocrit


 


 


 27.8 %


(36.0-47.0) 





 


Mean Corpuscular Volume   85 fL ()  


 


Mean Corpuscular Hemoglobin   28 pg (25-35)  


 


Mean Corpuscular Hemoglobin


Concent 


 


 34 g/dL


(31-37) 





 


Red Cell Distribution Width


 


 


 18.6 %


(11.5-14.5) 





 


Platelet Count


 


 


 434 x10^3/uL


(140-400) 





 


Neutrophils (%) (Auto)   80 % (31-73)  


 


Lymphocytes (%) (Auto)   15 % (24-48)  


 


Monocytes (%) (Auto)   5 % (0-9)  


 


Eosinophils (%) (Auto)   1 % (0-3)  


 


Basophils (%) (Auto)   0 % (0-3)  


 


Neutrophils # (Auto)


 


 


 6.4 x10^3/uL


(1.8-7.7) 





 


Lymphocytes # (Auto)


 


 


 1.2 x10^3/uL


(1.0-4.8) 





 


Monocytes # (Auto)


 


 


 0.4 x10^3/uL


(0.0-1.1) 





 


Eosinophils # (Auto)


 


 


 0.1 x10^3/uL


(0.0-0.7) 





 


Basophils # (Auto)


 


 


 0.0 x10^3/uL


(0.0-0.2) 





 


Sodium Level


 


 


 143 mmol/L


(136-145) 





 


Potassium Level


 


 


 3.3 mmol/L


(3.5-5.1) 





 


Chloride Level


 


 


 111 mmol/L


() 





 


Carbon Dioxide Level


 


 


 23 mmol/L


(21-32) 





 


Anion Gap   9 (6-14)  


 


Blood Urea Nitrogen


 


 


 34 mg/dL


(7-20) 





 


Creatinine


 


 


 0.7 mg/dL


(0.6-1.0) 





 


Estimated GFR


(Cockcroft-Gault) 


 


 88.9 


 





 


BUN/Creatinine Ratio   49 (6-20)  


 


Glucose Level


 


 


 125 mg/dL


(70-99) 





 


Calcium Level


 


 


 8.8 mg/dL


(8.5-10.1) 





 


Magnesium Level


 


 


 1.8 mg/dL


(1.8-2.4) 





 


Total Bilirubin


 


 


 0.5 mg/dL


(0.2-1.0) 





 


Aspartate Amino Transf


(AST/SGOT) 


 


 62 U/L (15-37) 


 





 


Alanine Aminotransferase


(ALT/SGPT) 


 


 72 U/L (14-59) 


 





 


Alkaline Phosphatase


 


 


 132 U/L


() 





 


Total Protein


 


 


 4.5 g/dL


(6.4-8.2) 





 


Albumin


 


 


 1.1 g/dL


(3.4-5.0) 





 


Albumin/Globulin Ratio   0.3 (1.0-1.7)  


 


Test


 6/15/20


08:10 6/15/20


11:47 


 





 


O2 Saturation 97 % (92-99)    


 


Arterial Blood pH


 7.46


(7.35-7.45) 


 


 





 


Arterial Blood pCO2 at


Patient Temp 31 mmHg


(35-46) 


 


 





 


Arterial Blood pO2 at Patient


Temp 117 mmHg


() 


 


 





 


Arterial Blood HCO3


 22 mmol/L


(21-28) 


 


 





 


Arterial Blood Base Excess


 -2 mmol/L


(-3-3) 


 


 





 


FiO2 35    


 


Glucose (Fingerstick)


 


 121 mg/dL


(70-99) 


 














Assessment and Plan


Assessmemt and Plan


Problems


Medical Problems:


(1) Acute pancreatitis


Status: Acute  





(2) Cholelithiasis


Status: Acute  











Comment


Review of Relevant


I have reviewed the following items josy (where applicable) has been applied.


Labs





Laboratory Tests








Test


 6/13/20


15:08 6/13/20


18:19 6/13/20


20:10 6/14/20


00:38


 


White Blood Count


 9.3 x10^3/uL


(4.0-11.0) 


 8.4 x10^3/uL


(4.0-11.0) 





 


Red Blood Count


 2.78 x10^6/uL


(3.50-5.40) 


 3.04 x10^6/uL


(3.50-5.40) 





 


Hemoglobin


 8.1 g/dL


(12.0-15.5) 


 9.0 g/dL


(12.0-15.5) 





 


Hematocrit


 23.5 %


(36.0-47.0) 


 26.0 %


(36.0-47.0) 





 


Mean Corpuscular Volume 85 fL ()   86 fL ()  


 


Mean Corpuscular Hemoglobin 29 pg (25-35)   30 pg (25-35)  


 


Mean Corpuscular Hemoglobin


Concent 34 g/dL


(31-37) 


 35 g/dL


(31-37) 





 


Red Cell Distribution Width


 19.1 %


(11.5-14.5) 


 18.7 %


(11.5-14.5) 





 


Platelet Count


 380 x10^3/uL


(140-400) 


 388 x10^3/uL


(140-400) 





 


Magnesium Level


 1.5 mg/dL


(1.8-2.4) 


 


 





 


Glucose (Fingerstick)


 


 220 mg/dL


(70-99) 


 188 mg/dL


(70-99)


 


Test


 6/14/20


05:30 6/14/20


10:45 6/14/20


12:11 6/14/20


17:52


 


White Blood Count


 7.5 x10^3/uL


(4.0-11.0) 


 


 





 


Red Blood Count


 2.95 x10^6/uL


(3.50-5.40) 


 


 





 


Hemoglobin


 8.6 g/dL


(12.0-15.5) 


 


 





 


Hematocrit


 25.0 %


(36.0-47.0) 


 


 





 


Mean Corpuscular Volume 85 fL ()    


 


Mean Corpuscular Hemoglobin 29 pg (25-35)    


 


Mean Corpuscular Hemoglobin


Concent 34 g/dL


(31-37) 


 


 





 


Red Cell Distribution Width


 18.0 %


(11.5-14.5) 


 


 





 


Platelet Count


 382 x10^3/uL


(140-400) 


 


 





 


Neutrophils (%) (Auto) 79 % (31-73)    


 


Lymphocytes (%) (Auto) 17 % (24-48)    


 


Monocytes (%) (Auto) 4 % (0-9)    


 


Eosinophils (%) (Auto) 0 % (0-3)    


 


Basophils (%) (Auto) 0 % (0-3)    


 


Neutrophils # (Auto)


 5.9 x10^3/uL


(1.8-7.7) 


 


 





 


Lymphocytes # (Auto)


 1.3 x10^3/uL


(1.0-4.8) 


 


 





 


Monocytes # (Auto)


 0.3 x10^3/uL


(0.0-1.1) 


 


 





 


Eosinophils # (Auto)


 0.0 x10^3/uL


(0.0-0.7) 


 


 





 


Basophils # (Auto)


 0.0 x10^3/uL


(0.0-0.2) 


 


 





 


Sodium Level


 145 mmol/L


(136-145) 


 


 





 


Potassium Level


 3.8 mmol/L


(3.5-5.1) 


 


 





 


Chloride Level


 113 mmol/L


() 


 


 





 


Carbon Dioxide Level


 24 mmol/L


(21-32) 


 


 





 


Anion Gap 8 (6-14)    


 


Blood Urea Nitrogen


 36 mg/dL


(7-20) 


 


 





 


Creatinine


 0.8 mg/dL


(0.6-1.0) 


 


 





 


Estimated GFR


(Cockcroft-Gault) 76.2 


 


 


 





 


BUN/Creatinine Ratio 45 (6-20)    


 


Glucose Level


 227 mg/dL


(70-99) 


 


 





 


Calcium Level


 9.2 mg/dL


(8.5-10.1) 


 


 





 


Phosphorus Level


 3.7 mg/dL


(2.6-4.7) 


 


 





 


Magnesium Level


 2.5 mg/dL


(1.8-2.4) 


 


 





 


Total Bilirubin


 0.4 mg/dL


(0.2-1.0) 


 


 





 


Aspartate Amino Transf


(AST/SGOT) 31 U/L (15-37) 


 


 


 





 


Alanine Aminotransferase


(ALT/SGPT) 29 U/L (14-59) 


 


 


 





 


Alkaline Phosphatase


 82 U/L


() 


 


 





 


Total Protein


 4.6 g/dL


(6.4-8.2) 


 


 





 


Albumin


 1.0 g/dL


(3.4-5.0) 


 


 





 


Albumin/Globulin Ratio 0.3 (1.0-1.7)    


 


O2 Saturation  98 % (92-99)   


 


Arterial Blood pH


 


 7.45


(7.35-7.45) 


 





 


Arterial Blood pCO2 at


Patient Temp 


 34 mmHg


(35-46) 


 





 


Arterial Blood pO2 at Patient


Temp 


 146 mmHg


() 


 





 


Arterial Blood HCO3


 


 23 mmol/L


(21-28) 


 





 


Arterial Blood Base Excess


 


 -1 mmol/L


(-3-3) 


 





 


FiO2  40   


 


Glucose (Fingerstick)


 


 


 159 mg/dL


(70-99) 129 mg/dL


(70-99)


 


Test


 6/15/20


00:23 6/15/20


05:45 6/15/20


05:54 6/15/20


08:10


 


Glucose (Fingerstick)


 118 mg/dL


(70-99) 


 115 mg/dL


(70-99) 





 


White Blood Count


 


 8.1 x10^3/uL


(4.0-11.0) 


 





 


Red Blood Count


 


 3.28 x10^6/uL


(3.50-5.40) 


 





 


Hemoglobin


 


 9.3 g/dL


(12.0-15.5) 


 





 


Hematocrit


 


 27.8 %


(36.0-47.0) 


 





 


Mean Corpuscular Volume  85 fL ()   


 


Mean Corpuscular Hemoglobin  28 pg (25-35)   


 


Mean Corpuscular Hemoglobin


Concent 


 34 g/dL


(31-37) 


 





 


Red Cell Distribution Width


 


 18.6 %


(11.5-14.5) 


 





 


Platelet Count


 


 434 x10^3/uL


(140-400) 


 





 


Neutrophils (%) (Auto)  80 % (31-73)   


 


Lymphocytes (%) (Auto)  15 % (24-48)   


 


Monocytes (%) (Auto)  5 % (0-9)   


 


Eosinophils (%) (Auto)  1 % (0-3)   


 


Basophils (%) (Auto)  0 % (0-3)   


 


Neutrophils # (Auto)


 


 6.4 x10^3/uL


(1.8-7.7) 


 





 


Lymphocytes # (Auto)


 


 1.2 x10^3/uL


(1.0-4.8) 


 





 


Monocytes # (Auto)


 


 0.4 x10^3/uL


(0.0-1.1) 


 





 


Eosinophils # (Auto)


 


 0.1 x10^3/uL


(0.0-0.7) 


 





 


Basophils # (Auto)


 


 0.0 x10^3/uL


(0.0-0.2) 


 





 


Sodium Level


 


 143 mmol/L


(136-145) 


 





 


Potassium Level


 


 3.3 mmol/L


(3.5-5.1) 


 





 


Chloride Level


 


 111 mmol/L


() 


 





 


Carbon Dioxide Level


 


 23 mmol/L


(21-32) 


 





 


Anion Gap  9 (6-14)   


 


Blood Urea Nitrogen


 


 34 mg/dL


(7-20) 


 





 


Creatinine


 


 0.7 mg/dL


(0.6-1.0) 


 





 


Estimated GFR


(Cockcroft-Gault) 


 88.9 


 


 





 


BUN/Creatinine Ratio  49 (6-20)   


 


Glucose Level


 


 125 mg/dL


(70-99) 


 





 


Calcium Level


 


 8.8 mg/dL


(8.5-10.1) 


 





 


Magnesium Level


 


 1.8 mg/dL


(1.8-2.4) 


 





 


Total Bilirubin


 


 0.5 mg/dL


(0.2-1.0) 


 





 


Aspartate Amino Transf


(AST/SGOT) 


 62 U/L (15-37) 


 


 





 


Alanine Aminotransferase


(ALT/SGPT) 


 72 U/L (14-59) 


 


 





 


Alkaline Phosphatase


 


 132 U/L


() 


 





 


Total Protein


 


 4.5 g/dL


(6.4-8.2) 


 





 


Albumin


 


 1.1 g/dL


(3.4-5.0) 


 





 


Albumin/Globulin Ratio  0.3 (1.0-1.7)   


 


O2 Saturation    97 % (92-99) 


 


Arterial Blood pH


 


 


 


 7.46


(7.35-7.45)


 


Arterial Blood pCO2 at


Patient Temp 


 


 


 31 mmHg


(35-46)


 


Arterial Blood pO2 at Patient


Temp 


 


 


 117 mmHg


()


 


Arterial Blood HCO3


 


 


 


 22 mmol/L


(21-28)


 


Arterial Blood Base Excess


 


 


 


 -2 mmol/L


(-3-3)


 


FiO2    35 


 


Test


 6/15/20


11:47 


 


 





 


Glucose (Fingerstick)


 121 mg/dL


(70-99) 


 


 











Laboratory Tests








Test


 6/14/20


17:52 6/15/20


00:23 6/15/20


05:45 6/15/20


05:54


 


Glucose (Fingerstick)


 129 mg/dL


(70-99) 118 mg/dL


(70-99) 


 115 mg/dL


(70-99)


 


White Blood Count


 


 


 8.1 x10^3/uL


(4.0-11.0) 





 


Red Blood Count


 


 


 3.28 x10^6/uL


(3.50-5.40) 





 


Hemoglobin


 


 


 9.3 g/dL


(12.0-15.5) 





 


Hematocrit


 


 


 27.8 %


(36.0-47.0) 





 


Mean Corpuscular Volume   85 fL ()  


 


Mean Corpuscular Hemoglobin   28 pg (25-35)  


 


Mean Corpuscular Hemoglobin


Concent 


 


 34 g/dL


(31-37) 





 


Red Cell Distribution Width


 


 


 18.6 %


(11.5-14.5) 





 


Platelet Count


 


 


 434 x10^3/uL


(140-400) 





 


Neutrophils (%) (Auto)   80 % (31-73)  


 


Lymphocytes (%) (Auto)   15 % (24-48)  


 


Monocytes (%) (Auto)   5 % (0-9)  


 


Eosinophils (%) (Auto)   1 % (0-3)  


 


Basophils (%) (Auto)   0 % (0-3)  


 


Neutrophils # (Auto)


 


 


 6.4 x10^3/uL


(1.8-7.7) 





 


Lymphocytes # (Auto)


 


 


 1.2 x10^3/uL


(1.0-4.8) 





 


Monocytes # (Auto)


 


 


 0.4 x10^3/uL


(0.0-1.1) 





 


Eosinophils # (Auto)


 


 


 0.1 x10^3/uL


(0.0-0.7) 





 


Basophils # (Auto)


 


 


 0.0 x10^3/uL


(0.0-0.2) 





 


Sodium Level


 


 


 143 mmol/L


(136-145) 





 


Potassium Level


 


 


 3.3 mmol/L


(3.5-5.1) 





 


Chloride Level


 


 


 111 mmol/L


() 





 


Carbon Dioxide Level


 


 


 23 mmol/L


(21-32) 





 


Anion Gap   9 (6-14)  


 


Blood Urea Nitrogen


 


 


 34 mg/dL


(7-20) 





 


Creatinine


 


 


 0.7 mg/dL


(0.6-1.0) 





 


Estimated GFR


(Cockcroft-Gault) 


 


 88.9 


 





 


BUN/Creatinine Ratio   49 (6-20)  


 


Glucose Level


 


 


 125 mg/dL


(70-99) 





 


Calcium Level


 


 


 8.8 mg/dL


(8.5-10.1) 





 


Magnesium Level


 


 


 1.8 mg/dL


(1.8-2.4) 





 


Total Bilirubin


 


 


 0.5 mg/dL


(0.2-1.0) 





 


Aspartate Amino Transf


(AST/SGOT) 


 


 62 U/L (15-37) 


 





 


Alanine Aminotransferase


(ALT/SGPT) 


 


 72 U/L (14-59) 


 





 


Alkaline Phosphatase


 


 


 132 U/L


() 





 


Total Protein


 


 


 4.5 g/dL


(6.4-8.2) 





 


Albumin


 


 


 1.1 g/dL


(3.4-5.0) 





 


Albumin/Globulin Ratio   0.3 (1.0-1.7)  


 


Test


 6/15/20


08:10 6/15/20


11:47 


 





 


O2 Saturation 97 % (92-99)    


 


Arterial Blood pH


 7.46


(7.35-7.45) 


 


 





 


Arterial Blood pCO2 at


Patient Temp 31 mmHg


(35-46) 


 


 





 


Arterial Blood pO2 at Patient


Temp 117 mmHg


() 


 


 





 


Arterial Blood HCO3


 22 mmol/L


(21-28) 


 


 





 


Arterial Blood Base Excess


 -2 mmol/L


(-3-3) 


 


 





 


FiO2 35    


 


Glucose (Fingerstick)


 


 121 mg/dL


(70-99) 


 











Microbiology


6/13/20 Gram Stain Evaluation - Final, Resulted


          


6/13/20 Respiratory Culture - Preliminary, Resulted


          


6/7/20 Gram Stain - Final, Complete


         


6/7/20 Aerobic and Anaerobic Culture - Final, Complete


         


6/7/20 Antimicrobic Susceptibility - Final, Complete


         


6/7/20 Blood Culture - Final, Complete


         NO GROWTH AFTER 5 DAYS


6/7/20 Urine Culture - Final, Complete


         


5/30/20 Gram Stain - Final, Complete


          


5/30/20 Aerobic Culture - Final, Complete


Medications





Current Medications


Sodium Chloride 1,000 ml @  1,000 mls/hr Q1H IV  Last administered on 3/16/20at 

03:00;  Start 3/16/20 at 03:00;  Stop 3/16/20 at 03:59;  Status DC


Ondansetron HCl (Zofran) 4 mg 1X  ONCE IVP  Last administered on 3/16/20at 03:27

;  Start 3/16/20 at 03:00;  Stop 3/16/20 at 03:01;  Status DC


Morphine Sulfate (Morphine Sulfate) 4 mg 1X  ONCE IV ;  Start 3/16/20 at 03:00; 

Stop 3/16/20 at 03:01;  Status Cancel


Ketorolac Tromethamine (Toradol 30mg Vial) 30 mg 1X  ONCE IV  Last administered 

on 3/16/20at 02:54;  Start 3/16/20 at 03:00;  Stop 3/16/20 at 03:01;  Status DC


Fentanyl Citrate (Fentanyl 2ml Vial) 25 mcg 1X  ONCE IVP  Last administered on 

3/16/20at 03:23;  Start 3/16/20 at 03:30;  Stop 3/16/20 at 03:31;  Status DC


Fentanyl Citrate (Fentanyl 2ml Vial) 100 mcg STK-MED ONCE .ROUTE ;  Start 

3/16/20 at 03:18;  Stop 3/16/20 at 03:18;  Status DC


Iohexol (Omnipaque 350 Mg/ml) 90 ml 1X  ONCE IV  Last administered on 3/16/20at 

03:25;  Start 3/16/20 at 03:30;  Stop 3/16/20 at 03:31;  Status DC


Info (CONTRAST GIVEN -- Rx MONITORING) 1 each PRN DAILY  PRN MC SEE COMMENTS;  

Start 3/16/20 at 03:30;  Stop 3/18/20 at 03:29;  Status DC


Hydromorphone HCl (Dilaudid) 0.5 mg 1X  ONCE IV  Last administered on 3/16/20at 

03:55;  Start 3/16/20 at 04:30;  Stop 3/16/20 at 04:32;  Status DC


Ondansetron HCl (Zofran) 4 mg PRN Q8HRS  PRN IV NAUSEA/VOMITING 1ST CHOICE;  

Start 3/16/20 at 05:00;  Stop 3/16/20 at 09:27;  Status DC


Morphine Sulfate (Morphine Sulfate) 2 mg PRN Q2HR  PRN IV SEVERE PAIN 7-10 Last 

administered on 3/17/20at 12:26;  Start 3/16/20 at 05:00;  Stop 3/17/20 at 

14:15;  Status DC


Sodium Chloride 1,000 ml @  125 mls/hr Q8H IV  Last administered on 3/16/20at 

20:56;  Start 3/16/20 at 05:00;  Stop 3/17/20 at 04:59;  Status DC


Hydromorphone HCl (Dilaudid) 0.5 mg PRN Q3HRS  PRN IV SEVERE PAIN 7-10 Last 

administered on 3/17/20at 10:06;  Start 3/16/20 at 05:00;  Stop 3/17/20 at 12:

01;  Status DC


Piperacillin Sod/ Tazobactam Sod 4.5 gm/Sodium Chloride 100 ml @  200 mls/hr 1X 

ONCE IV  Last administered on 3/16/20at 05:44;  Start 3/16/20 at 06:00;  Stop 

3/16/20 at 06:29;  Status DC


Ondansetron HCl (Zofran) 4 mg PRN Q4HRS  PRN IV NAUSEA/VOMITING 1ST CHOICE Last 

administered on 6/15/20at 13:50;  Start 3/16/20 at 09:30


Insulin Human Lispro (HumaLOG) 0-9 UNITS Q6HRS SQ  Last administered on 

6/14/20at 12:21;  Start 3/16/20 at 09:30


Dextrose (Dextrose 50%-Water Syringe) 12.5 gm PRN Q15MIN  PRN IV SEE COMMENTS;  

Start 3/16/20 at 09:30


Pantoprazole Sodium (PROTONIX VIAL for IV PUSH) 40 mg DAILYAC IVP  Last 

administered on 6/15/20at 08:31;  Start 3/16/20 at 11:30


Prochlorperazine Edisylate (Compazine) 10 mg PRN Q6HRS  PRN IV NAUSEA/VOMITING, 

2nd CHOICE Last administered on 6/10/20at 14:33;  Start 3/16/20 at 17:45


Atenolol (Tenormin) 100 mg DAILY PO ;  Start 3/17/20 at 09:00;  Stop 3/16/20 at 

20:08;  Status DC


Metoprolol Tartrate (Lopressor Vial) 2.5 mg Q6HRS IVP  Last administered on 

3/17/20at 05:51;  Start 3/16/20 at 20:15;  Stop 3/17/20 at 10:02;  Status DC


Metoprolol Tartrate (Lopressor Vial) 5 mg Q6HRS IVP  Last administered on 

3/26/20at 00:12;  Start 3/17/20 at 10:15;  Stop 3/28/20 at 08:48;  Status DC


Hydromorphone HCl (Dilaudid) 1 mg PRN Q3HRS  PRN IV SEVERE PAIN 7-10 Last 

administered on 3/23/20at 05:13;  Start 3/17/20 at 12:00;  Stop 3/31/20 at 

00:25;  Status DC


Lidocaine HCl (Buffered Lidocaine 1%) 3 ml STK-MED ONCE .ROUTE ;  Start 3/17/20 

at 12:55;  Stop 3/17/20 at 12:56;  Status DC


Albumin Human 500 ml @  125 mls/hr 1X  ONCE IV  Last administered on 3/17/20at 

14:33;  Start 3/17/20 at 14:30;  Stop 3/17/20 at 18:32;  Status DC


Norepinephrine Bitartrate 8 mg/ Dextrose 258 ml @  17.299 mls/ hr CONT  PRN IV 

PER PROTOCOL Last administered on 4/14/20at 12:48;  Start 3/17/20 at 15:30;  

Stop 4/17/20 at 09:19;  Status DC


Sodium Chloride 1,000 ml @  125 mls/hr Q8H IV  Last administered on 3/17/20at 

21:04;  Start 3/17/20 at 16:00;  Stop 3/18/20 at 02:42;  Status DC


Albumin Human 500 ml @  125 mls/hr PRN BID  PRN IV After every 2L NSS & BP < 

90mm Last administered on 6/6/20at 11:40;  Start 3/17/20 at 16:00


Iohexol (Omnipaque 300 Mg/ml) 60 ml 1X  ONCE IV  Last administered on 3/17/20at 

17:20;  Start 3/17/20 at 17:00;  Stop 3/17/20 at 17:01;  Status DC


Info (CONTRAST GIVEN -- Rx MONITORING) 1 each PRN DAILY  PRN MC SEE COMMENTS;  

Start 3/17/20 at 17:00;  Stop 3/19/20 at 16:59;  Status DC


Meropenem 1 gm/ Sodium Chloride 100 ml @  200 mls/hr Q8HRS IV  Last administered

on 3/18/20at 05:45;  Start 3/17/20 at 20:00;  Stop 3/18/20 at 08:48;  Status DC


Furosemide (Lasix) 40 mg 1X  ONCE IVP  Last administered on 3/17/20at 22:12;  

Start 3/17/20 at 22:30;  Stop 3/17/20 at 22:31;  Status DC


Calcium Chloride 1000 mg/Sodium Chloride 110 ml @  220 mls/hr 1X  ONCE IV  Last 

administered on 3/17/20at 22:11;  Start 3/17/20 at 22:30;  Stop 3/17/20 at 

22:59;  Status DC


Albuterol Sulfate (Ventolin Neb Soln) 2.5 mg 1X  ONCE NEB  Last administered on 

3/18/20at 00:56;  Start 3/17/20 at 22:30;  Stop 3/17/20 at 22:31;  Status DC


Insulin Human Regular (HumuLIN R VIAL) 5 unit 1X  ONCE IV  Last administered on 

3/17/20at 22:14;  Start 3/17/20 at 22:30;  Stop 3/17/20 at 22:31;  Status DC


Magnesium Sulfate 50 ml @ 25 mls/hr 1X  ONCE IV  Last administered on 3/18/20at 

02:57;  Start 3/18/20 at 03:00;  Stop 3/18/20 at 04:59;  Status DC


Calcium Gluconate 1000 mg/Sodium Chloride 110 ml @  220 mls/hr 1X  ONCE IV  Last

administered on 3/18/20at 02:46;  Start 3/18/20 at 03:00;  Stop 3/18/20 at 

03:29;  Status DC


Sodium Chloride 1,000 ml @  200 mls/hr Q5H IV  Last administered on 3/18/20at 

02:46;  Start 3/18/20 at 03:00;  Stop 3/18/20 at 10:21;  Status DC


Calcium Gluconate 1000 mg/Sodium Chloride 110 ml @  220 mls/hr 1X  ONCE IV  Last

administered on 3/18/20at 03:21;  Start 3/18/20 at 03:30;  Stop 3/18/20 at 

03:59;  Status DC


Sodium Bicarbonate 50 meq/Sodium Chloride 1,050 ml @  75 mls/hr Q14H IV  Last 

administered on 3/22/20at 21:10;  Start 3/18/20 at 07:30;  Stop 3/23/20 at 

10:28;  Status DC


Calcium Gluconate 2000 mg/Sodium Chloride 120 ml @  220 mls/hr 1X  ONCE IV  Last

administered on 3/18/20at 09:05;  Start 3/18/20 at 07:30;  Stop 3/18/20 at 

08:02;  Status DC


Lidocaine HCl (Xylocaine-Mpf 1% 2ml Vial) 2 ml STK-MED ONCE .ROUTE ;  Start 

3/18/20 at 08:47;  Stop 3/18/20 at 08:47;  Status DC


Meropenem 500 mg/ Sodium Chloride 50 ml @  100 mls/hr Q12HR IV  Last 

administered on 3/23/20at 21:01;  Start 3/18/20 at 18:00;  Stop 3/24/20 at 

07:58;  Status DC


Lidocaine HCl (Buffered Lidocaine 1%) 3 ml STK-MED ONCE .ROUTE ;  Start 3/18/20 

at 09:46;  Stop 3/18/20 at 09:46;  Status DC


Lidocaine HCl (Buffered Lidocaine 1%) 6 ml 1X  ONCE INJ  Last administered on 

3/18/20at 10:26;  Start 3/18/20 at 10:15;  Stop 3/18/20 at 10:16;  Status DC


Info (Tpn Per Pharmacy) 1 each PRN DAILY  PRN MC SEE COMMENTS Last administered 

on 6/15/20at 09:51;  Start 3/18/20 at 12:00


Sodium Chloride 1,000 ml @  1,000 mls/hr Q1H PRN IV hypotension;  Start 3/18/20 

at 12:07;  Stop 3/18/20 at 18:06;  Status DC


Diphenhydramine HCl (Benadryl) 25 mg 1X PRN  PRN IV ITCHING;  Start 3/18/20 at 

12:15;  Stop 3/19/20 at 12:14;  Status DC


Diphenhydramine HCl (Benadryl) 25 mg 1X PRN  PRN IV ITCHING;  Start 3/18/20 at 

12:15;  Stop 3/19/20 at 12:14;  Status DC


Sodium Chloride 1,000 ml @  400 mls/hr Q2H30M PRN IV PATENCY;  Start 3/18/20 at 

12:07;  Stop 3/19/20 at 00:06;  Status DC


Info (PHARMACY MONITORING -- do not chart) 1 each PRN DAILY  PRN MC SEE 

COMMENTS;  Start 3/18/20 at 12:15;  Stop 3/20/20 at 08:13;  Status DC


Sodium Chloride 90 meq/Calcium Gluconate 10 meq/ Multivitamins 10 ml/Chromium/ 

Copper/Manganese/ Seleni/Zn 1 ml/ Total Parenteral Nutrition/Amino 

Acids/Dextrose/ Fat Emulsion Intravenous 55.005 ml  @ 2.292 mls/hr TPN  CONT IV 

;  Start 3/18/20 at 22:00;  Stop 3/18/20 at 12:33;  Status DC


Info (Tpn Per Pharmacy) 1 each PRN DAILY  PRN MC SEE COMMENTS;  Start 3/18/20 at

12:30;  Status UNV


Sodium Chloride 90 meq/Calcium Gluconate 10 meq/ Multivitamins 10 ml/Chromium/ 

Copper/Manganese/ Seleni/Zn 0.5 ml/ Total Parenteral Nutrition/Amino 

Acids/Dextrose/ Fat Emulsion Intravenous 1,512 ml @  63 mls/hr TPN  CONT IV  

Last administered on 3/18/20at 22:06;  Start 3/18/20 at 22:00;  Stop 3/19/20 at 

21:59;  Status DC


Calcium Carbonate/ Glycine (Tums) 500 mg PRN AFTMEALHC  PRN PO INDIGESTION;  

Start 3/18/20 at 17:45;  Stop 5/13/20 at 10:25;  Status DC


Calcium Gluconate (Calcium Gluconate) 2,000 mg 1X  ONCE IVP  Last administered 

on 3/19/20at 02:19;  Start 3/19/20 at 02:15;  Stop 3/19/20 at 02:16;  Status DC


Calcium Chloride 3000 mg/Sodium Chloride 1,030 ml @  50 mls/hr [id number] IV  Last 

administered on 3/21/20at 02:17;  Start 3/19/20 at 08:00;  Stop 3/21/20 at 

15:23;  Status DC


Lorazepam (Ativan Inj) 1 mg PRN Q4HRS  PRN IVP ANXIETY / AGITATION, 2nd choic 

Last administered on 4/17/20at 03:51;  Start 3/19/20 at 09:00;  Stop 4/17/20 at 

09:19;  Status DC


Sodium Chloride 1,000 ml @  1,000 mls/hr Q1H PRN IV hypotension;  Start 3/19/20 

at 08:56;  Stop 3/19/20 at 14:55;  Status DC


Albumin Human 200 ml @  200 mls/hr 1X PRN  PRN IV Hypotension;  Start 3/19/20 at

09:00;  Stop 3/19/20 at 14:59;  Status DC


Diphenhydramine HCl (Benadryl) 25 mg 1X PRN  PRN IV ITCHING;  Start 3/19/20 at 

09:00;  Stop 3/20/20 at 08:59;  Status DC


Diphenhydramine HCl (Benadryl) 25 mg 1X PRN  PRN IV ITCHING;  Start 3/19/20 at 

09:00;  Stop 3/20/20 at 08:59;  Status DC


Sodium Chloride 1,000 ml @  400 mls/hr Q2H30M PRN IV PATENCY;  Start 3/19/20 at 

08:56;  Stop 3/19/20 at 20:55;  Status DC


Info (PHARMACY MONITORING -- do not chart) 1 each PRN DAILY  PRN MC SEE 

COMMENTS;  Start 3/19/20 at 09:00;  Status UNV


Info (PHARMACY MONITORING -- do not chart) 1 each PRN DAILY  PRN MC SEE 

COMMENTS;  Start 3/19/20 at 09:00;  Stop 3/20/20 at 08:13;  Status DC


Digoxin (Lanoxin) 500 mcg 1X  ONCE IV  Last administered on 3/19/20at 10:04;  

Start 3/19/20 at 10:00;  Stop 3/19/20 at 10:01;  Status DC


Digoxin (Lanoxin) 125 mcg 1X  ONCE IV  Last administered on 3/19/20at 17:10;  

Start 3/19/20 at 18:00;  Stop 3/19/20 at 18:01;  Status DC


Magnesium Sulfate 100 ml @  25 mls/hr 1X  ONCE IV  Last administered on 

3/19/20at 12:48;  Start 3/19/20 at 13:00;  Stop 3/19/20 at 16:59;  Status DC


Sodium Chloride 90 meq/Magnesium Sulfate 10 meq/ Calcium Gluconate 20 meq/ 

Multivitamins 10 ml/Chromium/ Copper/Manganese/ Seleni/Zn 0.5 ml/ Total 

Parenteral Nutrition/Amino Acids/Dextrose/ Fat Emulsion Intravenous 1,512 ml @  

63 mls/hr TPN  CONT IV  Last administered on 3/19/20at 22:25;  Start 3/19/20 at 

22:00;  Stop 3/20/20 at 21:59;  Status DC


Sodium Chloride 1,000 ml @  1,000 mls/hr Q1H PRN IV hypotension;  Start 3/20/20 

at 08:05;  Stop 3/20/20 at 14:04;  Status DC


Albumin Human 200 ml @  200 mls/hr 1X  ONCE IV  Last administered on 3/20/20at 

08:57;  Start 3/20/20 at 08:15;  Stop 3/20/20 at 09:14;  Status DC


Diphenhydramine HCl (Benadryl) 25 mg 1X PRN  PRN IV ITCHING;  Start 3/20/20 at 

08:15;  Stop 3/21/20 at 08:14;  Status DC


Diphenhydramine HCl (Benadryl) 25 mg 1X PRN  PRN IV ITCHING;  Start 3/20/20 at 

08:15;  Stop 3/21/20 at 08:14;  Status DC


Sodium Chloride 1,000 ml @  400 mls/hr Q2H30M PRN IV PATENCY;  Start 3/20/20 at 

08:05;  Stop 3/20/20 at 20:04;  Status DC


Info (PHARMACY MONITORING -- do not chart) 1 each PRN DAILY  PRN MC SEE 

COMMENTS;  Start 3/20/20 at 08:15;  Stop 3/24/20 at 07:57;  Status DC


Sodium Chloride 90 meq/Potassium Chloride 15 meq/ Potassium Phosphate 10 mmol/ 

Magnesium Sulfate 10 meq/Calcium Gluconate 20 meq/ Multivitamins 10 ml/Chromium/

Copper/Manganese/ Seleni/Zn 0.5 ml/ Total Parenteral Nutrition/Amino 

Acids/Dextrose/ Fat Emulsion Intravenous 1,512 ml @  63 mls/hr TPN  CONT IV  

Last administered on 3/20/20at 21:01;  Start 3/20/20 at 22:00;  Stop 3/21/20 at 

21:59;  Status DC


Potassium Chloride/Water 100 ml @  100 mls/hr 1X  ONCE IV  Last administered on 

3/20/20at 14:09;  Start 3/20/20 at 14:00;  Stop 3/20/20 at 14:59;  Status DC


Benzocaine (Hurricaine One) 1 spray 1X  ONCE MM  Last administered on 3/20/20at 

16:38;  Start 3/20/20 at 14:30;  Stop 3/20/20 at 14:31;  Status DC


Lidocaine HCl (Glydo (Lidocaine) Jelly) 1 thomas 1X  ONCE MM  Last administered on 

3/20/20at 16:38;  Start 3/20/20 at 14:30;  Stop 3/20/20 at 14:31;  Status DC


Linezolid/Dextrose 300 ml @  300 mls/hr Q12HR IV  Last administered on 3/26/20at

21:04;  Start 3/20/20 at 20:00;  Stop 3/27/20 at 07:50;  Status DC


Acetaminophen (Tylenol) 650 mg PRN Q6HRS  PRN PO MILD PAIN / TEMP;  Start 

3/21/20 at 03:30;  Stop 3/21/20 at 03:36;  Status DC


Acetaminophen (Tylenol) 650 mg PRN Q6HRS  PRN PEG MILD PAIN / TEMP Last 

administered on 4/16/20at 19:56;  Start 3/21/20 at 03:36;  Stop 5/13/20 at 

10:25;  Status DC


Sodium Chloride 1,000 ml @  1,000 mls/hr Q1H PRN IV hypotension;  Start 3/21/20 

at 07:50;  Stop 3/21/20 at 13:49;  Status DC


Albumin Human 200 ml @  200 mls/hr 1X PRN  PRN IV Hypotension;  Start 3/21/20 at

08:00;  Stop 3/21/20 at 13:59;  Status DC


Sodium Chloride (Normal Saline Flush) 10 ml 1X PRN  PRN IV AP catheter pack;  

Start 3/21/20 at 08:00;  Stop 3/22/20 at 07:59;  Status DC


Sodium Chloride (Normal Saline Flush) 10 ml 1X PRN  PRN IV  catheter pack;  

Start 3/21/20 at 08:00;  Stop 3/22/20 at 07:59;  Status DC


Sodium Chloride 1,000 ml @  400 mls/hr Q2H30M PRN IV PATENCY;  Start 3/21/20 at 

07:50;  Stop 3/21/20 at 19:49;  Status DC


Info (PHARMACY MONITORING -- do not chart) 1 each PRN DAILY  PRN MC SEE 

COMMENTS;  Start 3/21/20 at 08:00;  Status UNV


Info (PHARMACY MONITORING -- do not chart) 1 each PRN DAILY  PRN MC SEE 

COMMENTS;  Start 3/21/20 at 08:00;  Stop 3/23/20 at 08:25;  Status DC


Sodium Chloride 90 meq/Potassium Chloride 15 meq/ Potassium Phosphate 10 mmol/ 

Magnesium Sulfate 10 meq/Calcium Gluconate 20 meq/ Multivitamins 10 ml/Chromium/

Copper/Manganese/ Seleni/Zn 0.5 ml/ Total Parenteral Nutrition/Amino 

Acids/Dextrose/ Fat Emulsion Intravenous 1,512 ml @  63 mls/hr TPN  CONT IV  

Last administered on 3/21/20at 20:57;  Start 3/21/20 at 22:00;  Stop 3/22/20 at 

21:59;  Status DC


Sodium Chloride 90 meq/Potassium Chloride 15 meq/ Potassium Phosphate 15 mmol/ 

Magnesium Sulfate 10 meq/Calcium Gluconate 20 meq/ Multivitamins 10 ml/Chromium/

Copper/Manganese/ Seleni/Zn 0.5 ml/ Total Parenteral Nutrition/Amino Aci

ds/Dextrose/ Fat Emulsion Intravenous 1,512 ml @  63 mls/hr TPN  CONT IV ;  

Start 3/22/20 at 22:00;  Stop 3/22/20 at 14:16;  Status DC


Sodium Chloride 90 meq/Potassium Chloride 15 meq/ Potassium Phosphate 15 mmol/ 

Magnesium Sulfate 10 meq/Calcium Gluconate 20 meq/ Multivitamins 10 ml/Chromium/

Copper/Manganese/ Seleni/Zn 0.5 ml/ Total Parenteral Nutrition/Amino 

Acids/Dextrose/ Fat Emulsion Intravenous 1,200 ml @  50 mls/hr TPN  CONT IV ;  

Start 3/22/20 at 22:00;  Stop 3/22/20 at 14:17;  Status DC


Sodium Chloride 90 meq/Potassium Chloride 15 meq/ Potassium Phosphate 10 mmol/ 

Magnesium Sulfate 10 meq/Calcium Gluconate 20 meq/ Multivitamins 10 ml/Chromium/

Copper/Manganese/ Seleni/Zn 0.5 ml/ Total Parenteral Nutrition/Amino 

Acids/Dextrose/ Fat Emulsion Intravenous 1,200 ml @  50 mls/hr TPN  CONT IV  

Last administered on 3/22/20at 23:29;  Start 3/22/20 at 22:00;  Stop 3/23/20 at 

21:59;  Status DC


Sodium Chloride 1,000 ml @  1,000 mls/hr Q1H PRN IV hypotension;  Start 3/23/20 

at 07:28;  Stop 3/23/20 at 13:27;  Status DC


Albumin Human 200 ml @  200 mls/hr 1X  ONCE IV  Last administered on 3/23/20at 

08:51;  Start 3/23/20 at 07:30;  Stop 3/23/20 at 08:29;  Status DC


Diphenhydramine HCl (Benadryl) 25 mg 1X PRN  PRN IV ITCHING;  Start 3/23/20 at 

07:30;  Stop 3/24/20 at 07:29;  Status DC


Diphenhydramine HCl (Benadryl) 25 mg 1X PRN  PRN IV ITCHING;  Start 3/23/20 at 

07:30;  Stop 3/24/20 at 07:29;  Status DC


Sodium Chloride 1,000 ml @  400 mls/hr Q2H30M PRN IV PATENCY;  Start 3/23/20 at 

07:28;  Stop 3/23/20 at 19:27;  Status DC


Info (PHARMACY MONITORING -- do not chart) 1 each PRN DAILY  PRN MC SEE 

COMMENTS;  Start 3/23/20 at 07:30;  Stop 4/3/20 at 13:01;  Status DC


Metronidazole 100 ml @  100 mls/hr Q6HRS IV  Last administered on 4/8/20at 

06:26;  Start 3/23/20 at 08:30;  Stop 4/8/20 at 09:58;  Status DC


Micafungin Sodium 100 mg/Dextrose 100 ml @  100 mls/hr Q24H IV  Last 

administered on 4/30/20at 08:18;  Start 3/23/20 at 09:00;  Stop 4/30/20 at 

20:58;  Status DC


Propofol 0 ml @ As Directed STK-MED ONCE IV ;  Start 3/23/20 at 07:53;  Stop 

3/23/20 at 07:53;  Status DC


Etomidate (Amidate) 20 mg STK-MED ONCE IV ;  Start 3/23/20 at 07:53;  Stop 

3/23/20 at 07:54;  Status DC


Midazolam HCl (Versed) 5 mg STK-MED ONCE .ROUTE ;  Start 3/23/20 at 07:57;  Stop

3/23/20 at 07:57;  Status DC


Fentanyl Citrate 30 ml @ 0 mls/hr CONT  PRN IV SEE PROTOCOL Last administered on

4/17/20at 06:12;  Start 3/23/20 at 08:15;  Stop 4/17/20 at 09:19;  Status DC


Artificial Tears (Artificial Tears) 1 drop PRN Q1HR  PRN OU DRY EYE, 1st choice;

 Start 3/23/20 at 08:15;  Stop 4/29/20 at 05:31;  Status DC


Midazolam HCl 50 mg/Sodium Chloride 50 ml @ 0 mls/hr CONT  PRN IV SEE PROTOCOL 

Last administered on 3/26/20at 22:39;  Start 3/23/20 at 08:15;  Stop 3/28/20 at 

15:59;  Status DC


Etomidate (Amidate) 8 mg 1X  ONCE IV  Last administered on 3/23/20at 08:33;  

Start 3/23/20 at 08:30;  Stop 3/23/20 at 08:31;  Status DC


Succinylcholine Chloride (Anectine) 120 mg 1X  ONCE IV  Last administered on 

3/23/20at 08:34;  Start 3/23/20 at 08:30;  Stop 3/23/20 at 08:31;  Status DC


Midazolam HCl (Versed) 5 mg 1X  ONCE IV ;  Start 3/23/20 at 08:30;  Stop 3/23/20

at 08:31;  Status DC


Potassium Chloride 15 meq/ Bicarbonate Dialysis Soln w/ out KCl 5,007.5 ml  @ 

1,000 mls/ hr Q5H1M IV  Last administered on 3/24/20at 11:11;  Start 3/23/20 at 

12:00;  Stop 3/24/20 at 11:15;  Status DC


Potassium Chloride 15 meq/ Bicarbonate Dialysis Soln w/ out KCl 5,007.5 ml  @ 

1,000 mls/ hr Q5H1M IV  Last administered on 3/24/20at 11:12;  Start 3/23/20 at 

12:00;  Stop 3/24/20 at 11:17;  Status DC


Potassium Chloride 15 meq/ Bicarbonate Dialysis Soln w/ out KCl 5,007.5 ml  @ 

1,000 mls/ hr Q5H1M IV  Last administered on 3/24/20at 11:11;  Start 3/23/20 at 

12:00;  Stop 3/24/20 at 11:19;  Status DC


Sodium Chloride 90 meq/Potassium Chloride 15 meq/ Potassium Phosphate 10 mmol/ 

Magnesium Sulfate 10 meq/Calcium Gluconate 20 meq/ Multivitamins 10 ml/Chromium/

Copper/Manganese/ Seleni/Zn 0.5 ml/ Total Parenteral Nutrition/Amino 

Acids/Dextrose/ Fat Emulsion Intravenous 1,400 ml @  58.333 mls/ hr TPN  CONT IV

 Last administered on 3/23/20at 21:42;  Start 3/23/20 at 22:00;  Stop 3/24/20 at

21:59;  Status DC


Heparin Sodium (Porcine) (Heparin Sodium) 5,000 unit Q8HRS SQ  Last administered

on 3/28/20at 05:55;  Start 3/23/20 at 15:00;  Stop 3/28/20 at 13:28;  Status DC


Meropenem 500 mg/ Sodium Chloride 50 ml @  100 mls/hr Q6HRS IV  Last 

administered on 3/25/20at 06:00;  Start 3/24/20 at 09:00;  Stop 3/25/20 at 

07:29;  Status DC


Potassium Phosphate 20 mmol/ Sodium Chloride 106.6667 ml @  51.667 m... 1X  ONCE

IV  Last administered on 3/24/20at 11:22;  Start 3/24/20 at 10:15;  Stop 3/24/20

at 12:18;  Status DC


Acetaminophen (Tylenol Supp) 650 mg PRN Q6HRS  PRN VA MILD PAIN / TEMP > 100.3'F

Last administered on 6/10/20at 22:16;  Start 3/24/20 at 10:30


Potassium Chloride/Water 100 ml @  100 mls/hr Q1H IV  Last administered on 

3/24/20at 12:12;  Start 3/24/20 at 11:00;  Stop 3/24/20 at 12:59;  Status DC


Potassium Chloride 20 meq/ Bicarbonate Dialysis Soln w/ out KCl 5,010 ml @  

1,000 mls/hr Q5H1M IV  Last administered on 3/25/20at 08:48;  Start 3/24/20 at 

12:00;  Stop 3/25/20 at 13:03;  Status DC


Potassium Chloride 20 meq/ Bicarbonate Dialysis Soln w/ out KCl 5,010 ml @  

1,000 mls/hr Q5H1M IV  Last administered on 3/29/20at 14:52;  Start 3/24/20 at 

11:30;  Stop 3/29/20 at 19:59;  Status DC


Potassium Chloride 20 meq/ Bicarbonate Dialysis Soln w/ out KCl 5,010 ml @  

1,000 mls/hr Q5H1M IV  Last administered on 3/29/20at 14:53;  Start 3/24/20 at 

11:30;  Stop 3/29/20 at 19:59;  Status DC


Sodium Chloride 90 meq/Potassium Chloride 15 meq/ Potassium Phosphate 15 mmol/ 

Magnesium Sulfate 10 meq/Calcium Gluconate 15 meq/ Multivitamins 10 ml/Chromium/

Copper/Manganese/ Seleni/Zn 0.5 ml/ Total Parenteral Nutrition/Amino 

Acids/Dextrose/ Fat Emulsion Intravenous 1,400 ml @  58.333 mls/ hr TPN  CONT IV

 Last administered on 3/24/20at 22:17;  Start 3/24/20 at 22:00;  Stop 3/25/20 at

21:59;  Status DC


Cefepime HCl (Maxipime) 2 gm Q12HR IVP  Last administered on 4/7/20at 20:56;  

Start 3/25/20 at 09:00;  Stop 4/8/20 at 09:58;  Status DC


Daptomycin 500 mg/ Sodium Chloride 50 ml @  100 mls/hr Q48H IV  Last 

administered on 4/10/20at 09:57;  Start 3/25/20 at 08:30;  Stop 4/10/20 at 

10:07;  Status DC


Lidocaine HCl (Buffered Lidocaine 1%) 3 ml 1X  ONCE INJ  Last administered on 

3/25/20at 10:27;  Start 3/25/20 at 10:30;  Stop 3/25/20 at 10:31;  Status DC


Potassium Phosphate 20 mmol/ Sodium Chloride 106.6667 ml @  51.667 m... 1X  ONCE

IV  Last administered on 3/25/20at 12:51;  Start 3/25/20 at 13:00;  Stop 3/25/20

at 15:03;  Status DC


Sodium Chloride 90 meq/Potassium Chloride 15 meq/ Potassium Phosphate 18 mmol/ 

Magnesium Sulfate 8 meq/Calcium Gluconate 15 meq/ Multivitamins 10 ml/Chromium/ 

Copper/Manganese/ Seleni/Zn 0.5 ml/ Total Parenteral Nutrition/Amino 

Acids/Dextrose/ Fat Emulsion Intravenous 1,400 ml @  58.333 mls/ hr TPN  CONT IV

 Last administered on 3/25/20at 22:16;  Start 3/25/20 at 22:00;  Stop 3/26/20 at

21:59;  Status DC


Potassium Chloride 20 meq/ Bicarbonate Dialysis Soln w/ out KCl 5,010 ml @  

1,000 mls/hr Q5H1M IV  Last administered on 3/29/20at 14:54;  Start 3/25/20 at 

16:00;  Stop 3/29/20 at 19:59;  Status DC


Multi-Ingred Cream/Lotion/Oil/ Oint (Artificial Tears Eye Ointment) 1 thomas PRN 

Q1HR  PRN OU DRY EYE, 2nd choice Last administered on 4/13/20at 08:19;  Start 

3/25/20 at 17:30;  Stop 6/3/20 at 14:39;  Status DC


Sodium Chloride 90 meq/Potassium Chloride 15 meq/ Potassium Phosphate 18 mmol/ 

Magnesium Sulfate 8 meq/Calcium Gluconate 15 meq/ Multivitamins 10 ml/Chromium/ 

Copper/Manganese/ Seleni/Zn 0.5 ml/ Total Parenteral Nutrition/Amino 

Acids/Dextrose/ Fat Emulsion Intravenous 1,400 ml @  58.333 mls/ hr TPN  CONT IV

 Last administered on 3/26/20at 22:00;  Start 3/26/20 at 22:00;  Stop 3/27/20 at

21:59;  Status DC


Albumin Human 500 ml @  125 mls/hr 1X  ONCE IV ;  Start 3/26/20 at 14:15;  Stop 

3/26/20 at 18:14;  Status DC


Sodium Chloride 90 meq/Potassium Chloride 15 meq/ Potassium Phosphate 18 mmol/ 

Magnesium Sulfate 8 meq/Calcium Gluconate 15 meq/ Multivitamins 10 ml/Chromium/ 

Copper/Manganese/ Seleni/Zn 0.5 ml/ Insulin Human Regular 10 unit/ Total 

Parenteral Nutrition/Amino Acids/Dextrose/ Fat Emulsion Intravenous 1,400 ml @  

58.333 mls/ hr TPN  CONT IV  Last administered on 3/27/20at 21:43;  Start 

3/27/20 at 22:00;  Stop 3/28/20 at 21:59;  Status DC


Lidocaine HCl (Buffered Lidocaine 1%) 3 ml STK-MED ONCE .ROUTE ;  Start 3/25/20 

at 10:00;  Stop 3/27/20 at 13:57;  Status DC


Midazolam HCl 100 mg/Sodium Chloride 100 ml @ 7 mls/hr CONT  PRN IV SEE PROTOCOL

Last administered on 4/8/20at 15:35;  Start 3/28/20 at 16:00;  Stop 6/3/20 at 

14:38;  Status DC


Sodium Chloride 90 meq/Potassium Chloride 15 meq/ Potassium Phosphate 18 mmol/ 

Magnesium Sulfate 8 meq/Calcium Gluconate 15 meq/ Multivitamins 10 ml/Chromium/ 

Copper/Manganese/ Seleni/Zn 0.5 ml/ Insulin Human Regular 15 unit/ Total 

Parenteral Nutrition/Amino Acids/Dextrose/ Fat Emulsion Intravenous 1,400 ml @  

58.333 mls/ hr TPN  CONT IV  Last administered on 3/28/20at 20:34;  Start 

3/28/20 at 22:00;  Stop 3/29/20 at 21:59;  Status DC


Info ([facility] Electrolyte Protocol) 1 ea CONT PRN  PRN MC PER PROTOCOL;  Start 

3/29/20 at 13:15


Sodium Chloride 90 meq/Potassium Chloride 15 meq/ Potassium Phosphate 18 mmol/ 

Magnesium Sulfate 8 meq/Calcium Gluconate 15 meq/ Multivitamins 10 ml/Chromium/ 

Copper/Manganese/ Seleni/Zn 0.5 ml/ Insulin Human Regular 15 unit/ Total 

Parenteral Nutrition/Amino Acids/Dextrose/ Fat Emulsion Intravenous 1,400 ml @  

58.333 mls/ hr TPN  CONT IV  Last administered on 3/29/20at 22:05;  Start 

3/29/20 at 22:00;  Stop 3/30/20 at 21:59;  Status DC


Potassium Chloride 15 meq/ Bicarbonate Dialysis Soln w/ out KCl 5,007.5 ml  @ 

1,000 mls/ hr Q5H1M IV  Last administered on 4/1/20at 18:14;  Start 3/29/20 at 

20:00;  Stop 4/2/20 at 13:08;  Status DC


Potassium Chloride 15 meq/ Bicarbonate Dialysis Soln w/ out KCl 5,007.5 ml  @ 

1,000 mls/ hr Q5H1M IV  Last administered on 4/1/20at 18:14;  Start 3/29/20 at 

20:00;  Stop 4/2/20 at 13:08;  Status DC


Potassium Chloride 15 meq/ Bicarbonate Dialysis Soln w/ out KCl 5,007.5 ml  @ 

1,000 mls/ hr Q5H1M IV  Last administered on 4/1/20at 18:14;  Start 3/29/20 at 

20:00;  Stop 4/2/20 at 13:08;  Status DC


Iohexol (Omnipaque 240 Mg/ml) 30 ml 1X  ONCE PO  Last administered on 3/30/20at 

11:30;  Start 3/30/20 at 11:30;  Stop 3/30/20 at 11:33;  Status DC


Info (CONTRAST GIVEN -- Rx MONITORING) 1 each PRN DAILY  PRN MC SEE COMMENTS;  

Start 3/30/20 at 11:45;  Stop 4/1/20 at 11:44;  Status DC


Sodium Chloride 90 meq/Potassium Chloride 15 meq/ Potassium Phosphate 18 mmol/ 

Magnesium Sulfate 8 meq/Calcium Gluconate 15 meq/ Multivitamins 10 ml/Chromium/ 

Copper/Manganese/ Seleni/Zn 0.5 ml/ Insulin Human Regular 15 unit/ Total 

Parenteral Nutrition/Amino Acids/Dextrose/ Fat Emulsion Intravenous 1,400 ml @  

58.333 mls/ hr TPN  CONT IV  Last administered on 3/30/20at 21:47;  Start 

3/30/20 at 22:00;  Stop 3/31/20 at 21:59;  Status DC


Sodium Chloride 90 meq/Potassium Chloride 15 meq/ Potassium Phosphate 18 mmol/ 

Magnesium Sulfate 8 meq/Calcium Gluconate 15 meq/ Multivitamins 10 ml/Chromium/ 

Copper/Manganese/ Seleni/Zn 0.5 ml/ Insulin Human Regular 20 unit/ Total 

Parenteral Nutrition/Amino Acids/Dextrose/ Fat Emulsion Intravenous 1,400 ml @  

58.333 mls/ hr TPN  CONT IV  Last administered on 3/31/20at 21:36;  Start 

3/31/20 at 22:00;  Stop 4/1/20 at 21:59;  Status DC


Alteplase, Recombinant (Cathflo For Central Catheter Clearance) 1 mg 1X  ONCE 

INT CAT  Last administered on 3/31/20at 20:03;  Start 3/31/20 at 19:30;  Stop 

3/31/20 at 19:46;  Status DC


Alteplase, Recombinant (Cathflo For Central Catheter Clearance) 1 mg 1X  ONCE 

INT CAT  Last administered on 3/31/20at 22:05;  Start 3/31/20 at 22:00;  Stop 

3/31/20 at 22:01;  Status DC


Sodium Chloride 90 meq/Potassium Chloride 15 meq/ Potassium Phosphate 18 mmol/ 

Magnesium Sulfate 8 meq/Calcium Gluconate 15 meq/ Multivitamins 10 ml/Chromium/ 

Copper/Manganese/ Seleni/Zn 0.5 ml/ Insulin Human Regular 20 unit/ Total 

Parenteral Nutrition/Amino Acids/Dextrose/ Fat Emulsion Intravenous 1,400 ml @  

58.333 mls/ hr TPN  CONT IV  Last administered on 4/1/20at 21:30;  Start 4/1/20 

at 22:00;  Stop 4/2/20 at 21:59;  Status DC


Dexmedetomidine HCl 400 mcg/ Sodium Chloride 100 ml @ 0 mls/hr CONT  PRN IV 

ANXIETY / AGITATION Last administered on 5/30/20at 12:57;  Start 4/2/20 at 

08:15;  Stop 5/30/20 at 18:31;  Status DC


Sodium Chloride 500 ml @  500 mls/hr 1X PRN  PRN IV ELEVATED BP, SEE COMMENTS;  

Start 4/2/20 at 08:15


Atropine Sulfate (ATROPINE 0.5mg SYRINGE) 0.5 mg PRN Q5MIN  PRN IV SEE COMMENTS;

 Start 4/2/20 at 08:15


Furosemide (Lasix) 20 mg 1X  ONCE IVP  Last administered on 4/2/20at 08:19;  

Start 4/2/20 at 08:15;  Stop 4/2/20 at 08:16;  Status DC


Lidocaine HCl (Buffered Lidocaine 1%) 3 ml STK-MED ONCE .ROUTE ;  Start 4/2/20 

at 08:39;  Stop 4/2/20 at 08:39;  Status DC


Lidocaine HCl (Buffered Lidocaine 1%) 6 ml 1X  ONCE INJ  Last administered on 

4/2/20at 09:05;  Start 4/2/20 at 09:00;  Stop 4/2/20 at 09:06;  Status DC


Sodium Chloride 90 meq/Potassium Chloride 15 meq/ Potassium Phosphate 18 mmol/ 

Magnesium Sulfate 8 meq/Calcium Gluconate 15 meq/ Multivitamins 10 ml/Chromium/ 

Copper/Manganese/ Seleni/Zn 0.5 ml/ Insulin Human Regular 20 unit/ Total 

Parenteral Nutrition/Amino Acids/Dextrose/ Fat Emulsion Intravenous 1,400 ml @  

58.333 mls/ hr TPN  CONT IV  Last administered on 4/2/20at 22:45;  Start 4/2/20 

at 22:00;  Stop 4/3/20 at 21:59;  Status DC


Sodium Chloride 1,000 ml @  1,000 mls/hr Q1H PRN IV hypotension;  Start 4/3/20 

at 07:30;  Stop 4/3/20 at 13:29;  Status DC


Albumin Human 200 ml @  200 mls/hr 1X PRN  PRN IV Hypotension Last administered 

on 4/3/20at 09:36;  Start 4/3/20 at 07:30;  Stop 4/3/20 at 13:29;  Status DC


Sodium Chloride (Normal Saline Flush) 10 ml 1X PRN  PRN IV AP catheter pack;  S

tart 4/3/20 at 07:30;  Stop 4/3/20 at 21:29;  Status DC


Sodium Chloride (Normal Saline Flush) 10 ml 1X PRN  PRN IV  catheter pack;  

Start 4/3/20 at 07:30;  Stop 4/4/20 at 07:29;  Status DC


Sodium Chloride 1,000 ml @  400 mls/hr Q2H30M PRN IV PATENCY;  Start 4/3/20 at 

07:30;  Stop 4/3/20 at 19:29;  Status DC


Info (PHARMACY MONITORING -- do not chart) 1 each PRN DAILY  PRN MC SEE C

OMMENTS;  Start 4/3/20 at 07:30;  Stop 4/3/20 at 13:02;  Status DC


Info (PHARMACY MONITORING -- do not chart) 1 each PRN DAILY  PRN MC SEE 

COMMENTS;  Start 4/3/20 at 07:30;  Stop 4/5/20 at 12:45;  Status DC


Sodium Chloride 90 meq/Potassium Chloride 15 meq/ Potassium Phosphate 10 mmol/ 

Magnesium Sulfate 8 meq/Calcium Gluconate 15 meq/ Multivitamins 10 ml/Chromium/ 

Copper/Manganese/ Seleni/Zn 0.5 ml/ Insulin Human Regular 25 unit/ Total 

Parenteral Nutrition/Amino Acids/Dextrose/ Fat Emulsion Intravenous 1,400 ml @  

58.333 mls/ hr TPN  CONT IV  Last administered on 4/3/20at 22:19;  Start 4/3/20 

at 22:00;  Stop 4/4/20 at 21:59;  Status DC


Heparin Sodium (Porcine) (Heparin Sodium) 5,000 unit Q12HR SQ  Last administered

on 4/26/20at 08:59;  Start 4/3/20 at 21:00;  Stop 4/26/20 at 10:05;  Status DC


Ondansetron HCl (Zofran) 4 mg PRN Q6HRS  PRN IV NAUSEA/VOMITING;  Start 4/6/20 

at 07:00;  Stop 4/7/20 at 06:59;  Status DC


Fentanyl Citrate (Fentanyl 2ml Vial) 25 mcg PRN Q5MIN  PRN IV MILD PAIN 1-3;  

Start 4/6/20 at 07:00;  Stop 4/7/20 at 06:59;  Status DC


Fentanyl Citrate (Fentanyl 2ml Vial) 50 mcg PRN Q5MIN  PRN IV MODERATE TO SEVERE

PAIN;  Start 4/6/20 at 07:00;  Stop 4/7/20 at 06:59;  Status DC


Ringer's Solution 1,000 ml @  30 mls/hr Q24H IV ;  Start 4/6/20 at 07:00;  Stop 

4/6/20 at 18:59;  Status DC


Lidocaine HCl (Xylocaine-Mpf 1% 2ml Vial) 2 ml PRN 1X  PRN ID PRIOR TO IV START;

 Start 4/6/20 at 07:00;  Stop 4/7/20 at 06:59;  Status DC


Prochlorperazine Edisylate (Compazine) 5 mg PACU PRN  PRN IV NAUSEA, MRX1;  

Start 4/6/20 at 07:00;  Stop 4/7/20 at 06:59;  Status DC


Sodium Chloride 1,000 ml @  1,000 mls/hr Q1H PRN IV hypotension;  Start 4/4/20 

at 09:10;  Stop 4/4/20 at 15:09;  Status DC


Albumin Human 200 ml @  200 mls/hr 1X PRN  PRN IV Hypotension Last administered 

on 4/4/20at 10:10;  Start 4/4/20 at 09:15;  Stop 4/4/20 at 15:14;  Status DC


Sodium Chloride 1,000 ml @  400 mls/hr Q2H30M PRN IV PATENCY;  Start 4/4/20 at 

09:10;  Stop 4/4/20 at 21:09;  Status DC


Info (PHARMACY MONITORING -- do not chart) 1 each PRN DAILY  PRN MC SEE 

COMMENTS;  Start 4/4/20 at 09:15;  Stop 4/5/20 at 12:45;  Status DC


Info (PHARMACY MONITORING -- do not chart) 1 each PRN DAILY  PRN MC SEE 

COMMENTS;  Start 4/4/20 at 09:15;  Stop 4/5/20 at 12:45;  Status DC


Sodium Chloride 90 meq/Potassium Chloride 15 meq/ Potassium Phosphate 10 mmol/ 

Magnesium Sulfate 8 meq/Calcium Gluconate 15 meq/ Multivitamins 10 ml/Chromium/ 

Copper/Manganese/ Seleni/Zn 0.5 ml/ Insulin Human Regular 25 unit/ Total 

Parenteral Nutrition/Amino Acids/Dextrose/ Fat Emulsion Intravenous 1,400 ml @  

58.333 mls/ hr TPN  CONT IV  Last administered on 4/4/20at 22:10;  Start 4/4/20 

at 22:00;  Stop 4/5/20 at 21:59;  Status DC


Magnesium Sulfate 50 ml @ 25 mls/hr PRN DAILY  PRN IV for Mag < 1.7 on am labs 

Last administered on 4/20/20at 17:27;  Start 4/5/20 at 09:15


Sodium Chloride 90 meq/Potassium Chloride 15 meq/ Potassium Phosphate 10 mmol/ 

Magnesium Sulfate 8 meq/Calcium Gluconate 15 meq/ Multivitamins 10 ml/Chromium/ 

Copper/Manganese/ Seleni/Zn 0.5 ml/ Insulin Human Regular 25 unit/ Total 

Parenteral Nutrition/Amino Acids/Dextrose/ Fat Emulsion Intravenous 1,400 ml @  

58.333 mls/ hr TPN  CONT IV  Last administered on 4/5/20at 21:20;  Start 4/5/20 

at 22:00;  Stop 4/6/20 at 21:59;  Status DC


Sodium Chloride 1,000 ml @  1,000 mls/hr Q1H PRN IV hypotension;  Start 4/5/20 

at 12:23;  Stop 4/5/20 at 18:22;  Status DC


Albumin Human 200 ml @  200 mls/hr 1X  ONCE IV  Last administered on 4/5/20at 

13:34;  Start 4/5/20 at 12:30;  Stop 4/5/20 at 13:29;  Status DC


Diphenhydramine HCl (Benadryl) 25 mg 1X PRN  PRN IV ITCHING;  Start 4/5/20 at 

12:30;  Stop 4/6/20 at 12:29;  Status DC


Diphenhydramine HCl (Benadryl) 25 mg 1X PRN  PRN IV ITCHING;  Start 4/5/20 at 

12:30;  Stop 4/6/20 at 12:29;  Status DC


Info (PHARMACY MONITORING -- do not chart) 1 each PRN DAILY  PRN MC SEE 

COMMENTS;  Start 4/5/20 at 12:30;  Status Cancel


Bupivacaine HCl/ Epinephrine Bitart (Sensorcain-Epi 0.5%-1:123009 Mpf) 30 ml 

STK-MED ONCE .ROUTE  Last administered on 4/6/20at 11:44;  Start 4/6/20 at 

11:00;  Stop 4/6/20 at 11:01;  Status DC


Cellulose (Surgicel Fibrillar 1x2) 1 each STK-MED ONCE .ROUTE ;  Start 4/6/20 at

11:00;  Stop 4/6/20 at 11:01;  Status DC


Sodium Chloride 90 meq/Potassium Chloride 15 meq/ Potassium Phosphate 10 mmol/ 

Magnesium Sulfate 12 meq/Calcium Gluconate 15 meq/ Multivitamins 10 ml/Chromium/

Copper/Manganese/ Seleni/Zn 0.5 ml/ Insulin Human Regular 25 unit/ Total 

Parenteral Nutrition/Amino Acids/Dextrose/ Fat Emulsion Intravenous 1,400 ml @  

58.333 mls/ hr TPN  CONT IV  Last administered on 4/6/20at 22:24;  Start 4/6/20 

at 22:00;  Stop 4/7/20 at 21:59;  Status DC


Propofol 20 ml @ As Directed STK-MED ONCE IV ;  Start 4/6/20 at 11:07;  Stop 

4/6/20 at 11:07;  Status DC


Cellulose (Surgicel Hemostat 4x8) 1 each STK-MED ONCE .ROUTE  Last administered 

on 4/6/20at 11:44;  Start 4/6/20 at 11:55;  Stop 4/6/20 at 11:56;  Status DC


Sevoflurane (Ultane) 60 ml STK-MED ONCE IH ;  Start 4/6/20 at 12:46;  Stop 

4/6/20 at 12:46;  Status DC


Sodium Chloride 1,000 ml @  1,000 mls/hr Q1H PRN IV hypotension;  Start 4/6/20 

at 13:51;  Stop 4/6/20 at 19:50;  Status DC


Albumin Human 200 ml @  200 mls/hr 1X PRN  PRN IV Hypotension Last administered 

on 4/6/20at 14:51;  Start 4/6/20 at 14:00;  Stop 4/6/20 at 19:59;  Status DC


Diphenhydramine HCl (Benadryl) 25 mg 1X PRN  PRN IV ITCHING;  Start 4/6/20 at 

14:00;  Stop 4/7/20 at 13:59;  Status DC


Diphenhydramine HCl (Benadryl) 25 mg 1X PRN  PRN IV ITCHING;  Start 4/6/20 at 

14:00;  Stop 4/7/20 at 13:59;  Status DC


Sodium Chloride 1,000 ml @  400 mls/hr Q2H30M PRN IV PATENCY;  Start 4/6/20 at 

13:51;  Stop 4/7/20 at 01:50;  Status DC


Info (PHARMACY MONITORING -- do not chart) 1 each PRN DAILY  PRN MC SEE 

COMMENTS;  Start 4/6/20 at 14:00;  Stop 4/9/20 at 08:16;  Status DC


Heparin Sodium (Porcine) (Hep Lock Adult) 500 unit STK-MED ONCE IVP ;  Start 

4/7/20 at 09:29;  Stop 4/7/20 at 09:30;  Status DC


Sodium Chloride 1,000 ml @  1,000 mls/hr Q1H PRN IV hypotension;  Start 4/7/20 

at 10:43;  Stop 4/7/20 at 16:42;  Status DC


Sodium Chloride 1,000 ml @  400 mls/hr Q2H30M PRN IV PATENCY;  Start 4/7/20 at 

10:43;  Stop 4/7/20 at 22:42;  Status DC


Info (PHARMACY MONITORING -- do not chart) 1 each PRN DAILY  PRN MC SEE 

COMMENTS;  Start 4/7/20 at 10:45;  Status UNV


Info (PHARMACY MONITORING -- do not chart) 1 each PRN DAILY  PRN MC SEE 

COMMENTS;  Start 4/7/20 at 10:45;  Status UNV


Sodium Chloride 90 meq/Potassium Chloride 15 meq/ Magnesium Sulfate 12 

meq/Calcium Gluconate 15 meq/ Multivitamins 10 ml/Chromium/ Copper/Manganese/ 

Seleni/Zn 0.5 ml/ Insulin Human Regular 25 unit/ Total Parenteral 

Nutrition/Amino Acids/Dextrose/ Fat Emulsion Intravenous 1,400 ml @  58.333 mls/

hr TPN  CONT IV  Last administered on 4/7/20at 22:13;  Start 4/7/20 at 22:00;  

Stop 4/8/20 at 21:59;  Status DC


Sodium Chloride 1,000 ml @  1,000 mls/hr Q1H PRN IV hypotension;  Start 4/8/20 

at 07:50;  Stop 4/8/20 at 13:49;  Status DC


Albumin Human 200 ml @  200 mls/hr 1X  ONCE IV ;  Start 4/8/20 at 08:00;  Stop 

4/8/20 at 08:53;  Status DC


Diphenhydramine HCl (Benadryl) 25 mg 1X PRN  PRN IV ITCHING;  Start 4/8/20 at 

08:00;  Stop 4/9/20 at 07:59;  Status DC


Diphenhydramine HCl (Benadryl) 25 mg 1X PRN  PRN IV ITCHING;  Start 4/8/20 at 

08:00;  Stop 4/9/20 at 07:59;  Status DC


Info (PHARMACY MONITORING -- do not chart) 1 each PRN DAILY  PRN MC SEE 

COMMENTS;  Start 4/8/20 at 08:00;  Stop 4/9/20 at 08:16;  Status DC


Albumin Human 50 ml @ 50 mls/hr 1X  ONCE IV ;  Start 4/8/20 at 08:53;  Stop 

4/8/20 at 08:56;  Status DC


Albumin Human 200 ml @  50 mls/hr PRN 1X  PRN IV HYPOTENSION Last administered 

on 4/14/20at 11:54;  Start 4/8/20 at 09:00;  Stop 5/21/20 at 11:14;  Status DC


Meropenem 500 mg/ Sodium Chloride 50 ml @  100 mls/hr Q12H IV  Last administered

on 4/28/20at 10:45;  Start 4/8/20 at 10:00;  Stop 4/28/20 at 12:37;  Status DC


Sodium Chloride 90 meq/Magnesium Sulfate 12 meq/ Calcium Gluconate 15 meq/ 

Multivitamins 10 ml/Chromium/ Copper/Manganese/ Seleni/Zn 0.5 ml/ Insulin Human 

Regular 25 unit/ Total Parenteral Nutrition/Amino Acids/Dextrose/ Fat Emulsion 

Intravenous 1,400 ml @  58.333 mls/ hr TPN  CONT IV  Last administered on 

4/8/20at 21:41;  Start 4/8/20 at 22:00;  Stop 4/9/20 at 21:59;  Status DC


Sodium Chloride 1,000 ml @  1,000 mls/hr Q1H PRN IV hypotension;  Start 4/9/20 

at 07:58;  Stop 4/9/20 at 13:57;  Status DC


Albumin Human 200 ml @  200 mls/hr 1X PRN  PRN IV Hypotension Last administered 

on 4/9/20at 09:30;  Start 4/9/20 at 08:00;  Stop 4/9/20 at 13:59;  Status DC


Sodium Chloride 1,000 ml @  400 mls/hr Q2H30M PRN IV PATENCY;  Start 4/9/20 at 

07:58;  Stop 4/9/20 at 19:57;  Status DC


Info (PHARMACY MONITORING -- do not chart) 1 each PRN DAILY  PRN MC SEE COMMENT

S;  Start 4/9/20 at 08:00;  Status Cancel


Info (PHARMACY MONITORING -- do not chart) 1 each PRN DAILY  PRN MC SEE 

COMMENTS;  Start 4/9/20 at 08:15;  Status UNV


Sodium Chloride 90 meq/Potassium Phosphate 5 mmol/ Magnesium Sulfate 12 

meq/Calcium Gluconate 15 meq/ Multivitamins 10 ml/Chromium/ Copper/Manganese/ 

Seleni/Zn 0.5 ml/ Insulin Human Regular 30 unit/ Total Parenteral Nutritio

n/Amino Acids/Dextrose/ Fat Emulsion Intravenous 1,400 ml @  58.333 mls/ hr TPN 

CONT IV  Last administered on 4/9/20at 22:08;  Start 4/9/20 at 22:00;  Stop 

4/10/20 at 21:59;  Status DC


Linezolid/Dextrose 300 ml @  300 mls/hr Q12HR IV  Last administered on 4/20/20at

20:40;  Start 4/10/20 at 11:00;  Stop 4/21/20 at 08:10;  Status DC


Sodium Chloride 90 meq/Potassium Phosphate 15 mmol/ Magnesium Sulfate 12 

meq/Calcium Gluconate 15 meq/ Multivitamins 10 ml/Chromium/ Copper/Manganese/ 

Seleni/Zn 0.5 ml/ Insulin Human Regular 30 unit/ Total Parenteral 

Nutrition/Amino Acids/Dextrose/ Fat Emulsion Intravenous 1,400 ml @  58.333 mls/

hr TPN  CONT IV  Last administered on 4/10/20at 21:49;  Start 4/10/20 at 22:00; 

Stop 4/11/20 at 21:59;  Status DC


Sodium Chloride 90 meq/Potassium Phosphate 15 mmol/ Magnesium Sulfate 12 

meq/Calcium Gluconate 15 meq/ Multivitamins 10 ml/Chromium/ Copper/Manganese/ 

Seleni/Zn 0.5 ml/ Insulin Human Regular 40 unit/ Total Parenteral 

Nutrition/Amino Acids/Dextrose/ Fat Emulsion Intravenous 1,400 ml @  58.333 mls/

hr TPN  CONT IV  Last administered on 4/11/20at 21:21;  Start 4/11/20 at 22:00; 

Stop 4/12/20 at 21:59;  Status DC


Sodium Chloride 1,000 ml @  1,000 mls/hr Q1H PRN IV hypotension;  Start 4/11/20 

at 13:26;  Stop 4/11/20 at 19:25;  Status DC


Albumin Human 200 ml @  200 mls/hr 1X PRN  PRN IV Hypotension Last administered 

on 4/11/20at 15:00;  Start 4/11/20 at 13:30;  Stop 4/11/20 at 19:29;  Status DC


Sodium Chloride (Normal Saline Flush) 10 ml 1X PRN  PRN IV AP catheter pack;  

Start 4/11/20 at 13:30;  Stop 4/12/20 at 13:29;  Status DC


Sodium Chloride (Normal Saline Flush) 10 ml 1X PRN  PRN IV  catheter pack;  

Start 4/11/20 at 13:30;  Stop 4/12/20 at 13:29;  Status DC


Sodium Chloride 1,000 ml @  400 mls/hr Q2H30M PRN IV PATENCY;  Start 4/11/20 at 

13:26;  Stop 4/12/20 at 01:25;  Status DC


Info (PHARMACY MONITORING -- do not chart) 1 each PRN DAILY  PRN MC SEE COMMENT

S;  Start 4/11/20 at 13:30;  Stop 4/11/20 at 13:33;  Status DC


Info (PHARMACY MONITORING -- do not chart) 1 each PRN DAILY  PRN MC SEE 

COMMENTS;  Start 4/11/20 at 13:30;  Stop 4/11/20 at 13:34;  Status DC


Sodium Chloride 90 meq/Potassium Phosphate 19 mmol/ Magnesium Sulfate 12 

meq/Calcium Gluconate 15 meq/ Multivitamins 10 ml/Chromium/ Copper/Manganese/ 

Seleni/Zn 0.5 ml/ Insulin Human Regular 40 unit/ Total Parenteral 

Nutrition/Amino Acids/Dextrose/ Fat Emulsion Intravenous 1,400 ml @  58.333 mls/

hr TPN  CONT IV  Last administered on 4/12/20at 21:54;  Start 4/12/20 at 22:00; 

Stop 4/13/20 at 21:59;  Status DC


Sodium Chloride 1,000 ml @  1,000 mls/hr Q1H PRN IV hypotension;  Start 4/13/20 

at 09:35;  Stop 4/13/20 at 15:34;  Status DC


Albumin Human 200 ml @  200 mls/hr 1X PRN  PRN IV Hypotension;  Start 4/13/20 at

09:45;  Stop 4/13/20 at 15:44;  Status DC


Diphenhydramine HCl (Benadryl) 25 mg 1X PRN  PRN IV ITCHING;  Start 4/13/20 at 

09:45;  Stop 4/14/20 at 09:44;  Status DC


Diphenhydramine HCl (Benadryl) 25 mg 1X PRN  PRN IV ITCHING;  Start 4/13/20 at 

09:45;  Stop 4/14/20 at 09:44;  Status DC


Sodium Chloride 1,000 ml @  400 mls/hr Q2H30M PRN IV PATENCY;  Start 4/13/20 at 

09:35;  Stop 4/13/20 at 21:34;  Status DC


Info (PHARMACY MONITORING -- do not chart) 1 each PRN DAILY  PRN MC SEE 

COMMENTS;  Start 4/13/20 at 09:45;  Status Cancel


Sodium Chloride 100 meq/Potassium Phosphate 19 mmol/ Magnesium Sulfate 12 

meq/Calcium Gluconate 15 meq/ Multivitamins 10 ml/Chromium/ Copper/Manganese/ 

Seleni/Zn 0.5 ml/ Insulin Human Regular 40 unit/ Potassium Chloride 20 meq/ 

Total Parenteral Nutrition/Amino Acids/Dextrose/ Fat Emulsion Intravenous 1,400 

ml @  58.333 mls/ hr TPN  CONT IV  Last administered on 4/13/20at 22:02;  Start 

4/13/20 at 22:00;  Stop 4/14/20 at 21:59;  Status DC


Furosemide (Lasix) 40 mg 1X  ONCE IVP  Last administered on 4/13/20at 14:39;  

Start 4/13/20 at 14:30;  Stop 4/13/20 at 14:31;  Status DC


Metronidazole 100 ml @  100 mls/hr Q8HRS IV  Last administered on 4/21/20at 

06:04;  Start 4/14/20 at 10:00;  Stop 4/21/20 at 08:10;  Status DC


Sodium Chloride 1,000 ml @  1,000 mls/hr Q1H PRN IV hypotension;  Start 4/14/20 

at 08:00;  Stop 4/14/20 at 13:59;  Status DC


Albumin Human 200 ml @  200 mls/hr 1X PRN  PRN IV Hypotension;  Start 4/14/20 at

08:00;  Stop 4/14/20 at 13:59;  Status DC


Sodium Chloride 1,000 ml @  400 mls/hr Q2H30M PRN IV PATENCY;  Start 4/14/20 at 

08:00;  Stop 4/14/20 at 19:59;  Status DC


Info (PHARMACY MONITORING -- do not chart) 1 each PRN DAILY  PRN MC SEE 

COMMENTS;  Start 4/14/20 at 11:30;  Status UNV


Info (PHARMACY MONITORING -- do not chart) 1 each PRN DAILY  PRN MC SEE 

COMMENTS;  Start 4/14/20 at 11:30;  Stop 4/16/20 at 12:13;  Status DC


Sodium Chloride 100 meq/Potassium Phosphate 19 mmol/ Magnesium Sulfate 12 

meq/Calcium Gluconate 15 meq/ Multivitamins 10 ml/Chromium/ Copper/Manganese/ 

Seleni/Zn 0.5 ml/ Insulin Human Regular 40 unit/ Potassium Chloride 20 meq/ 

Total Parenteral Nutrition/Amino Acids/Dextrose/ Fat Emulsion Intravenous 1,400 

ml @  58.333 mls/ hr TPN  CONT IV  Last administered on 4/14/20at 21:52;  Start 

4/14/20 at 22:00;  Stop 4/15/20 at 21:59;  Status DC


Sodium Chloride (Normal Saline Flush) 10 ml QSHIFT  PRN IV AFTER MEDS AND BLOOD 

DRAWS;  Start 4/14/20 at 15:00;  Stop 5/12/20 at 11:27;  Status DC


Sodium Chloride (Normal Saline Flush) 10 ml PRN Q5MIN  PRN IV AFTER MEDS AND 

BLOOD DRAWS;  Start 4/14/20 at 15:00


Sodium Chloride (Normal Saline Flush) 20 ml PRN Q5MIN  PRN IV AFTER MEDS AND 

BLOOD DRAWS;  Start 4/14/20 at 15:00


Sodium Chloride 100 meq/Potassium Phosphate 19 mmol/ Magnesium Sulfate 12 

meq/Calcium Gluconate 15 meq/ Multivitamins 10 ml/Chromium/ Copper/Manganese/ 

Seleni/Zn 0.5 ml/ Insulin Human Regular 40 unit/ Potassium Chloride 20 meq/ 

Total Parenteral Nutrition/Amino Acids/Dextrose/ Fat Emulsion Intravenous 1,400 

ml @  58.333 mls/ hr TPN  CONT IV  Last administered on 4/15/20at 21:20;  Start 

4/15/20 at 22:00;  Stop 4/16/20 at 21:59;  Status DC


Lidocaine HCl (Buffered Lidocaine 1%) 3 ml STK-MED ONCE .ROUTE ;  Start 4/15/20 

at 13:16;  Stop 4/15/20 at 13:16;  Status DC


Lidocaine HCl (Buffered Lidocaine 1%) 6 ml 1X  ONCE INJ  Last administered on 

4/15/20at 13:45;  Start 4/15/20 at 13:30;  Stop 4/15/20 at 13:31;  Status DC


Albumin Human 100 ml @  100 mls/hr 1X  ONCE IV  Last administered on 4/15/20at 

15:41;  Start 4/15/20 at 15:00;  Stop 4/15/20 at 15:59;  Status DC


Albumin Human 50 ml @ 50 mls/hr 1X  ONCE IV  Last administered on 4/15/20at 

15:00;  Start 4/15/20 at 15:00;  Stop 4/15/20 at 15:59;  Status DC


Info (PHARMACY MONITORING -- do not chart) 1 each PRN DAILY  PRN MC SEE MIKEY

TS;  Start 4/16/20 at 11:30;  Status Cancel


Info (PHARMACY MONITORING -- do not chart) 1 each PRN DAILY  PRN MC SEE 

COMMENTS;  Start 4/16/20 at 11:30;  Status UNV


Sodium Chloride 100 meq/Potassium Phosphate 10 mmol/ Magnesium Sulfate 12 

meq/Calcium Gluconate 15 meq/ Multivitamins 10 ml/Chromium/ Copper/Manganese/ 

Seleni/Zn 0.5 ml/ Insulin Human Regular 35 unit/ Potassium Chloride 20 meq/ 

Total Parenteral Nutrition/Amino Acids/Dextrose/ Fat Emulsion Intravenous 1,400 

ml @  58.333 mls/ hr TPN  CONT IV  Last administered on 4/16/20at 22:10;  Start 

4/16/20 at 22:00;  Stop 4/17/20 at 21:59;  Status DC


Sodium Chloride 100 meq/Potassium Phosphate 5 mmol/ Magnesium Sulfate 12 

meq/Calcium Gluconate 15 meq/ Multivitamins 10 ml/Chromium/ Copper/Manganese/ 

Seleni/Zn 0.5 ml/ Insulin Human Regular 35 unit/ Potassium Chloride 20 meq/ 

Total Parenteral Nutrition/Amino Acids/Dextrose/ Fat Emulsion Intravenous 1,400 

ml @  58.333 mls/ hr TPN  CONT IV  Last administered on 4/17/20at 22:59;  Start 

4/17/20 at 22:00;  Stop 4/18/20 at 21:59;  Status DC


Sodium Chloride 1,000 ml @  1,000 mls/hr Q1H PRN IV hypotension;  Start 4/18/20 

at 08:27;  Stop 4/18/20 at 14:26;  Status DC


Albumin Human 200 ml @  200 mls/hr 1X PRN  PRN IV Hypotension Last administered 

on 4/18/20at 09:18;  Start 4/18/20 at 08:30;  Stop 4/18/20 at 14:29;  Status DC


Sodium Chloride 1,000 ml @  400 mls/hr Q2H30M PRN IV PATENCY;  Start 4/18/20 at 

08:27;  Stop 4/18/20 at 20:26;  Status DC


Info (PHARMACY MONITORING -- do not chart) 1 each PRN DAILY  PRN MC SEE 

COMMENTS;  Start 4/18/20 at 08:30;  Status Cancel


Info (PHARMACY MONITORING -- do not chart) 1 each PRN DAILY  PRN MC SEE 

COMMENTS;  Start 4/18/20 at 08:30;  Stop 4/26/20 at 13:10;  Status DC


Sodium Chloride 100 meq/Potassium Chloride 40 meq/ Magnesium Sulfate 15 meq/C

alcium Gluconate 15 meq/ Multivitamins 10 ml/Chromium/ Copper/Manganese/ 

Seleni/Zn 0.5 ml/ Insulin Human Regular 35 unit/ Total Parenteral 

Nutrition/Amino Acids/Dextrose/ Fat Emulsion Intravenous 1,400 ml @  58.333 mls/

hr TPN  CONT IV  Last administered on 4/18/20at 22:00;  Start 4/18/20 at 22:00; 

Stop 4/19/20 at 21:59;  Status DC


Potassium Chloride/Water 100 ml @  100 mls/hr 1X  ONCE IV  Last administered on 

4/18/20at 17:28;  Start 4/18/20 at 14:45;  Stop 4/18/20 at 15:44;  Status DC


Sodium Chloride 100 meq/Potassium Chloride 40 meq/ Magnesium Sulfate 15 

meq/Calcium Gluconate 15 meq/ Multivitamins 10 ml/Chromium/ Copper/Manganese/ 

Seleni/Zn 0.5 ml/ Insulin Human Regular 35 unit/ Total Parenteral 

Nutrition/Amino Acids/Dextrose/ Fat Emulsion Intravenous 1,400 ml @  58.333 mls/

hr TPN  CONT IV  Last administered on 4/19/20at 22:46;  Start 4/19/20 at 22:00; 

Stop 4/20/20 at 21:59;  Status DC


Sodium Chloride 100 meq/Potassium Chloride 40 meq/ Magnesium Sulfate 20 

meq/Calcium Gluconate 15 meq/ Multivitamins 10 ml/Chromium/ Copper/Manganese/ 

Seleni/Zn 0.5 ml/ Insulin Human Regular 35 unit/ Total Parenteral 

Nutrition/Amino Acids/Dextrose/ Fat Emulsion Intravenous 1,400 ml @  58.333 mls/

hr TPN  CONT IV  Last administered on 4/20/20at 22:31;  Start 4/20/20 at 22:00; 

Stop 4/21/20 at 21:59;  Status DC


Fentanyl Citrate (Fentanyl 2ml Vial) 50 mcg PRN Q2HR  PRN IVP PAIN Last 

administered on 4/27/20at 13:32;  Start 4/20/20 at 21:00;  Stop 4/28/20 at 

12:53;  Status DC


Fentanyl Citrate (Fentanyl 2ml Vial) 25 mcg PRN Q2HR  PRN IVP PAIN;  Start 

4/20/20 at 21:00;  Stop 4/28/20 at 12:54;  Status DC


Enoxaparin Sodium (Lovenox 100mg Syringe) 100 mg Q12HR SQ ;  Start 4/21/20 at 

21:00;  Status UNV


Amino Acids/ Glycerin/ Electrolytes 1,000 ml @  75 mls/hr [id number] IV ;  Start 

4/20/20 at 21:15;  Status UNV


Sodium Chloride 1,000 ml @  1,000 mls/hr Q1H PRN IV hypotension;  Start 4/21/20 

at 07:56;  Stop 4/21/20 at 13:55;  Status DC


Albumin Human 200 ml @  200 mls/hr 1X PRN  PRN IV Hypotension Last administered 

on 4/21/20at 08:40;  Start 4/21/20 at 08:00;  Stop 4/21/20 at 13:59;  Status DC


Sodium Chloride 1,000 ml @  400 mls/hr Q2H30M PRN IV PATENCY;  Start 4/21/20 at 

07:56;  Stop 4/21/20 at 19:55;  Status DC


Info (PHARMACY MONITORING -- do not chart) 1 each PRN DAILY  PRN MC SEE 

COMMENTS;  Start 4/21/20 at 08:00;  Status UNV


Info (PHARMACY MONITORING -- do not chart) 1 each PRN DAILY  PRN MC SEE 

COMMENTS;  Start 4/21/20 at 08:00;  Status UNV


Daptomycin 430 mg/ Sodium Chloride 50 ml @  100 mls/hr Q24H IV  Last 

administered on 4/21/20at 12:35;  Start 4/21/20 at 09:00;  Stop 4/21/20 at 

12:49;  Status DC


Sodium Chloride 100 meq/Potassium Chloride 40 meq/ Magnesium Sulfate 20 

meq/Calcium Gluconate 15 meq/ Multivitamins 10 ml/Chromium/ Copper/Manganese/ 

Seleni/Zn 0.5 ml/ Insulin Human Regular 35 unit/ Total Parenteral 

Nutrition/Amino Acids/Dextrose/ Fat Emulsion Intravenous 1,400 ml @  58.333 mls/

hr TPN  CONT IV  Last administered on 4/21/20at 21:26;  Start 4/21/20 at 22:00; 

Stop 4/22/20 at 21:59;  Status DC


Daptomycin 430 mg/ Sodium Chloride 50 ml @  100 mls/hr Q48H IV ;  Start 4/23/20 

at 09:00;  Stop 4/22/20 at 11:55;  Status DC


Sodium Chloride 100 meq/Potassium Chloride 40 meq/ Magnesium Sulfate 20 

meq/Calcium Gluconate 15 meq/ Multivitamins 10 ml/Chromium/ Copper/Manganese/ 

Seleni/Zn 0.5 ml/ Insulin Human Regular 35 unit/ Total Parenteral 

Nutrition/Amino Acids/Dextrose/ Fat Emulsion Intravenous 1,400 ml @  58.333 mls/

hr TPN  CONT IV  Last administered on 4/22/20at 22:27;  Start 4/22/20 at 22:00; 

Stop 4/23/20 at 21:59;  Status DC


Daptomycin 430 mg/ Sodium Chloride 50 ml @  100 mls/hr Q24H IV  Last 

administered on 4/24/20at 15:07;  Start 4/22/20 at 13:00;  Stop 4/25/20 at 

13:15;  Status DC


Sodium Chloride 100 meq/Potassium Chloride 40 meq/ Magnesium Sulfate 20 

meq/Calcium Gluconate 10 meq/ Multivitamins 10 ml/Chromium/ Copper/Manganese/ 

Seleni/Zn 0.5 ml/ Insulin Human Regular 35 unit/ Total Parenteral 

Nutrition/Amino Acids/Dextrose/ Fat Emulsion Intravenous 1,400 ml @  58.333 mls/

hr TPN  CONT IV  Last administered on 4/24/20at 00:06;  Start 4/23/20 at 22:00; 

Stop 4/24/20 at 21:59;  Status DC


Alteplase, Recombinant (Cathflo For Central Catheter Clearance) 1 mg 1X  ONCE IN

T CAT  Last administered on 4/24/20at 11:44;  Start 4/24/20 at 10:45;  Stop 

4/24/20 at 10:46;  Status DC


Ondansetron HCl (Zofran) 4 mg PRN Q6HRS  PRN IV NAUSEA/VOMITING;  Start 4/27/20 

at 07:00;  Stop 4/28/20 at 06:59;  Status DC


Fentanyl Citrate (Fentanyl 2ml Vial) 25 mcg PRN Q5MIN  PRN IV MILD PAIN 1-3;  

Start 4/27/20 at 07:00;  Stop 4/28/20 at 06:59;  Status DC


Fentanyl Citrate (Fentanyl 2ml Vial) 50 mcg PRN Q5MIN  PRN IV MODERATE TO SEVERE

PAIN Last administered on 4/27/20at 10:17;  Start 4/27/20 at 07:00;  Stop 

4/28/20 at 06:59;  Status DC


Ringer's Solution 1,000 ml @  30 mls/hr Q24H IV ;  Start 4/27/20 at 07:00;  Stop

4/27/20 at 18:59;  Status DC


Lidocaine HCl (Xylocaine-Mpf 1% 2ml Vial) 2 ml PRN 1X  PRN ID PRIOR TO IV START;

 Start 4/27/20 at 07:00;  Stop 4/28/20 at 06:59;  Status DC


Prochlorperazine Edisylate (Compazine) 5 mg PACU PRN  PRN IV NAUSEA, MRX1;  

Start 4/27/20 at 07:00;  Stop 4/28/20 at 06:59;  Status DC


Sodium Acetate 50 meq/Potassium Acetate 55 meq/ Magnesium Sulfate 20 meq/Calcium

Gluconate 10 meq/ Multivitamins 10 ml/Chromium/ Copper/Manganese/ Seleni/Zn 0.5 

ml/ Insulin Human Regular 35 unit/ Total Parenteral Nutrition/Amino 

Acids/Dextrose/ Fat Emulsion Intravenous 1,400 ml @  58.333 mls/ hr TPN  CONT IV

;  Start 4/24/20 at 22:00;  Stop 4/24/20 at 14:15;  Status DC


Sodium Acetate 50 meq/Potassium Acetate 55 meq/ Magnesium Sulfate 20 meq/Calcium

Gluconate 10 meq/ Multivitamins 10 ml/Chromium/ Copper/Manganese/ Seleni/Zn 0.5 

ml/ Insulin Human Regular 35 unit/ Total Parenteral Nutrition/Amino 

Acids/Dextrose/ Fat Emulsion Intravenous 1,800 ml @  75 mls/hr TPN  CONT IV  

Last administered on 4/24/20at 22:38;  Start 4/24/20 at 22:00;  Stop 4/25/20 at 

21:59;  Status DC


Sodium Chloride 1,000 ml @  1,000 mls/hr Q1H PRN IV hypotension;  Start 4/24/20 

at 15:31;  Stop 4/24/20 at 21:30;  Status DC


Diphenhydramine HCl (Benadryl) 25 mg 1X PRN  PRN IV ITCHING;  Start 4/24/20 at 

15:45;  Stop 4/25/20 at 15:44;  Status DC


Diphenhydramine HCl (Benadryl) 25 mg 1X PRN  PRN IV ITCHING;  Start 4/24/20 at 

15:45;  Stop 4/25/20 at 15:44;  Status DC


Sodium Chloride 1,000 ml @  400 mls/hr Q2H30M PRN IV PATENCY;  Start 4/24/20 at 

15:31;  Stop 4/25/20 at 03:30;  Status DC


Info (PHARMACY MONITORING -- do not chart) 1 each PRN DAILY  PRN MC SEE 

COMMENTS;  Start 4/24/20 at 15:45;  Stop 5/26/20 at 14:14;  Status DC


Sodium Acetate 50 meq/Potassium Acetate 55 meq/ Magnesium Sulfate 20 meq/Calcium

Gluconate 10 meq/ Multivitamins 10 ml/Chromium/ Copper/Manganese/ Seleni/Zn 0.5 

ml/ Insulin Human Regular 35 unit/ Total Parenteral Nutrition/Amino 

Acids/Dextrose/ Fat Emulsion Intravenous 1,800 ml @  75 mls/hr TPN  CONT IV  

Last administered on 4/25/20at 22:03;  Start 4/25/20 at 22:00;  Stop 4/26/20 at 

21:59;  Status DC


Daptomycin 430 mg/ Sodium Chloride 50 ml @  100 mls/hr Q24H IV  Last 

administered on 4/30/20at 13:00;  Start 4/25/20 at 13:00;  Stop 4/30/20 at 

20:58;  Status DC


Heparin Sodium (Porcine) 1000 unit/Sodium Chloride 1,001 ml @  1,001 mls/hr 1X  

ONCE IRR ;  Start 4/27/20 at 06:00;  Stop 4/27/20 at 06:59;  Status DC


Potassium Acetate 55 meq/Magnesium Sulfate 20 meq/ Calcium Gluconate 10 meq/ 

Multivitamins 10 ml/Chromium/ Copper/Manganese/ Seleni/Zn 0.5 ml/ Insulin Human 

Regular 35 unit/ Total Parenteral Nutrition/Amino Acids/Dextrose/ Fat Emulsion 

Intravenous 1,920 ml @  80 mls/hr TPN  CONT IV  Last administered on 4/26/20at 

22:10;  Start 4/26/20 at 22:00;  Stop 4/27/20 at 21:59;  Status DC


Dexamethasone Sodium Phosphate (Decadron) 4 mg STK-MED ONCE .ROUTE ;  Start 

4/27/20 at 10:56;  Stop 4/27/20 at 10:57;  Status DC


Ondansetron HCl (Zofran) 4 mg STK-MED ONCE .ROUTE ;  Start 4/27/20 at 10:56;  

Stop 4/27/20 at 10:57;  Status DC


Rocuronium Bromide (Zemuron) 50 mg STK-MED ONCE .ROUTE ;  Start 4/27/20 at 

10:56;  Stop 4/27/20 at 10:57;  Status DC


Fentanyl Citrate (Fentanyl 2ml Vial) 100 mcg STK-MED ONCE .ROUTE ;  Start 

4/27/20 at 10:56;  Stop 4/27/20 at 10:57;  Status DC


Bupivacaine HCl/ Epinephrine Bitart (Sensorcain-Epi 0.5%-1:[id number] Mpf) 30 ml 

STK-MED ONCE .ROUTE  Last administered on 4/27/20at 12:01;  Start 4/27/20 at 

10:58;  Stop 4/27/20 at 10:58;  Status DC


Cellulose (Surgicel Hemostat 2x14) 1 each STK-MED ONCE .ROUTE ;  Start 4/27/20 

at 10:58;  Stop 4/27/20 at 10:59;  Status DC


Iohexol (Omnipaque 300 Mg/ml) 50 ml STK-MED ONCE .ROUTE ;  Start 4/27/20 at 

10:58;  Stop 4/27/20 at 10:59;  Status DC


Cellulose (Surgicel Hemostat 4x8) 1 each STK-MED ONCE .ROUTE ;  Start 4/27/20 at

10:58;  Stop 4/27/20 at 10:59;  Status DC


Bisacodyl (Dulcolax Supp) 10 mg STK-MED ONCE .ROUTE ;  Start 4/27/20 at 10:59;  

Stop 4/27/20 at 10:59;  Status DC


Heparin Sodium (Porcine) 1000 unit/Sodium Chloride 1,001 ml @  1,001 mls/hr 1X  

ONCE IRR ;  Start 4/27/20 at 12:00;  Stop 4/27/20 at 12:59;  Status DC


Propofol 20 ml @ As Directed STK-MED ONCE IV ;  Start 4/27/20 at 11:05;  Stop 

4/27/20 at 11:05;  Status DC


Sevoflurane (Ultane) 90 ml STK-MED ONCE IH ;  Start 4/27/20 at 11:05;  Stop 

4/27/20 at 11:05;  Status DC


Sevoflurane (Ultane) 60 ml STK-MED ONCE IH ;  Start 4/27/20 at 12:26;  Stop 

4/27/20 at 12:27;  Status DC


Propofol 20 ml @ As Directed STK-MED ONCE IV ;  Start 4/27/20 at 12:26;  Stop 

4/27/20 at 12:27;  Status DC


Phenylephrine HCl (PHENYLEPHRINE in 0.9% NACL PF) 1 mg STK-MED ONCE IV ;  Start 

4/27/20 at 12:34;  Stop 4/27/20 at 12:34;  Status DC


Heparin Sodium (Porcine) (Heparin Sodium) 5,000 unit Q12HR SQ  Last administered

on 5/6/20at 20:57;  Start 4/27/20 at 21:00;  Stop 5/7/20 at 09:59;  Status DC


Sodium Chloride (Normal Saline Flush) 3 ml QSHIFT  PRN IV AFTER MEDS AND BLOOD 

DRAWS;  Start 4/27/20 at 13:45


Naloxone HCl (Narcan) 0.4 mg PRN Q2MIN  PRN IV SEE INSTRUCTIONS Last 

administered on 6/6/20at 15:15;  Start 4/27/20 at 13:45


Sodium Chloride 1,000 ml @  25 mls/hr Q24H IV  Last administered on 5/26/20at 

13:37;  Start 4/27/20 at 13:37;  Stop 5/29/20 at 13:09;  Status DC


Naloxone HCl (Narcan) 0.4 mg PRN Q2MIN  PRN IV SEE INSTRUCTIONS;  Start 4/27/20 

at 14:30;  Status UNV


Sodium Chloride 1,000 ml @  25 mls/hr Q24H IV ;  Start 4/27/20 at 14:30;  Status

UNV


Hydromorphone HCl 30 ml @ 0 mls/hr CONT PRN  PRN IV PER PROTOCOL Last 

administered on 5/2/20at 16:08;  Start 4/27/20 at 14:30;  Stop 5/4/20 at 08:55; 

Status DC


Potassium Acetate 55 meq/Magnesium Sulfate 20 meq/ Calcium Gluconate 10 meq/ 

Multivitamins 10 ml/Chromium/ Copper/Manganese/ Seleni/Zn 0.5 ml/ Insulin Human 

Regular 35 unit/ Total Parenteral Nutrition/Amino Acids/Dextrose/ Fat Emulsion 

Intravenous 1,920 ml @  80 mls/hr TPN  CONT IV  Last administered on 4/27/20at 

22:01;  Start 4/27/20 at 22:00;  Stop 4/28/20 at 21:59;  Status DC


Bumetanide (Bumex) 2 mg BID92 IV  Last administered on 5/1/20at 13:50;  Start 

4/28/20 at 14:00;  Stop 5/2/20 at 14:10;  Status DC


Meropenem 1 gm/ Sodium Chloride 100 ml @  200 mls/hr Q8HRS IV  Last administered

on 5/22/20at 05:53;  Start 4/28/20 at 14:00;  Stop 5/22/20 at 09:31;  Status DC


Potassium Acetate 55 meq/Magnesium Sulfate 20 meq/ Calcium Gluconate 10 meq/ 

Multivitamins 10 ml/Chromium/ Copper/Manganese/ Seleni/Zn 0.5 ml/ Insulin Human 

Regular 35 unit/ Total Parenteral Nutrition/Amino Acids/Dextrose/ Fat Emulsion 

Intravenous 1,920 ml @  80 mls/hr TPN  CONT IV  Last administered on 4/28/20at 

22:02;  Start 4/28/20 at 22:00;  Stop 4/29/20 at 21:59;  Status DC


Hydromorphone HCl (Dilaudid Standard PCA) 12 mg STK-MED ONCE IV ;  Start 4/27/20

at 14:35;  Stop 4/28/20 at 13:53;  Status DC


Artificial Tears (Artificial Tears) 1 drop PRN Q15MIN  PRN OU DRY EYE Last 

administered on 6/15/20at 03:38;  Start 4/29/20 at 05:30


Hydromorphone HCl (Dilaudid Standard PCA) 12 mg STK-MED ONCE IV ;  Start 4/28/20

at 12:05;  Stop 4/29/20 at 09:15;  Status DC


Potassium Acetate 65 meq/Magnesium Sulfate 20 meq/ Calcium Gluconate 10 meq/ 

Multivitamins 10 ml/Chromium/ Copper/Manganese/ Seleni/Zn 0.5 ml/ Insulin Human 

Regular 30 unit/ Total Parenteral Nutrition/Amino Acids/Dextrose/ Fat Emulsion 

Intravenous 1,920 ml @  80 mls/hr TPN  CONT IV  Last administered on 4/29/20at 

22:22;  Start 4/29/20 at 22:00;  Stop 4/30/20 at 21:59;  Status DC


Cyclobenzaprine HCl (Flexeril) 10 mg PRN Q6HRS  PRN PO MUSCLE SPASMS;  Start 

4/30/20 at 10:45


Potassium Acetate 55 meq/Magnesium Sulfate 20 meq/ Calcium Gluconate 10 meq/ Mu

ltivitamins 10 ml/Chromium/ Copper/Manganese/ Seleni/Zn 0.5 ml/ Insulin Human 

Regular 30 unit/ Total Parenteral Nutrition/Amino Acids/Dextrose/ Fat Emulsion 

Intravenous 1,920 ml @  80 mls/hr TPN  CONT IV  Last administered on 5/1/20at 

01:00;  Start 4/30/20 at 22:00;  Stop 5/1/20 at 21:59;  Status DC


Magnesium Sulfate 50 ml @ 25 mls/hr 1X  ONCE IV  Last administered on 4/30/20at 

17:18;  Start 4/30/20 at 12:45;  Stop 4/30/20 at 14:44;  Status DC


Potassium Chloride/Water 100 ml @  100 mls/hr 1X  ONCE IV  Last administered on 

5/1/20at 11:27;  Start 5/1/20 at 12:00;  Stop 5/1/20 at 12:59;  Status DC


Hydromorphone HCl (Dilaudid Standard PCA) 12 mg STK-MED ONCE IV ;  Start 4/29/20

at 10:50;  Stop 5/1/20 at 11:02;  Status DC


Hydromorphone HCl (Dilaudid Standard PCA) 12 mg STK-MED ONCE IV ;  Start 4/30/20

at 13:47;  Stop 5/1/20 at 11:03;  Status DC


Potassium Acetate 30 meq/Magnesium Sulfate 20 meq/ Calcium Gluconate 10 meq/ 

Multivitamins 10 ml/Chromium/ Copper/Manganese/ Seleni/Zn 0.5 ml/ Insulin Human 

Regular 30 unit/ Potassium Chloride 30 meq/ Total Parenteral Nutrition/Amino 

Acids/Dextrose/ Fat Emulsion Intravenous 1,920 ml @  80 mls/hr TPN  CONT IV  

Last administered on 5/1/20at 22:34;  Start 5/1/20 at 22:00;  Stop 5/2/20 at 

21:59;  Status DC


Potassium Chloride/Water 100 ml @  100 mls/hr Q1H IV  Last administered on 

5/2/20at 13:05;  Start 5/2/20 at 07:00;  Stop 5/2/20 at 10:59;  Status DC


Magnesium Sulfate 50 ml @ 25 mls/hr 1X  ONCE IV  Last administered on 5/2/20at 

10:34;  Start 5/2/20 at 10:30;  Stop 5/2/20 at 12:29;  Status DC


Potassium Chloride 75 meq/ Magnesium Sulfate 20 meq/Calcium Gluconate 10 meq/ 

Multivitamins 10 ml/Chromium/ Copper/Manganese/ Seleni/Zn 0.5 ml/ Insulin Human 

Regular 30 unit/ Total Parenteral Nutrition/Amino Acids/Dextrose/ Fat Emulsion 

Intravenous 1,920 ml @  80 mls/hr TPN  CONT IV  Last administered on 5/2/20at 

21:51;  Start 5/2/20 at 22:00;  Stop 5/3/20 at 22:00;  Status DC


Potassium Chloride 75 meq/ Magnesium Sulfate 20 meq/Calcium Gluconate 10 meq/ 

Multivitamins 10 ml/Chromium/ Copper/Manganese/ Seleni/Zn 0.5 ml/ Insulin Human 

Regular 25 unit/ Total Parenteral Nutrition/Amino Acids/Dextrose/ Fat Emulsion 

Intravenous 1,920 ml @  80 mls/hr TPN  CONT IV  Last administered on 5/3/20at 

22:04;  Start 5/3/20 at 22:00;  Stop 5/4/20 at 21:59;  Status DC


Hydromorphone HCl (Dilaudid) 0.4 mg PRN Q4HRS  PRN IVP PAIN Last administered on

5/4/20at 10:57;  Start 5/4/20 at 09:00;  Stop 5/4/20 at 18:59;  Status DC


Micafungin Sodium 100 mg/Dextrose 100 ml @  100 mls/hr Q24H IV  Last 

administered on 5/26/20at 12:17;  Start 5/4/20 at 11:00;  Stop 5/27/20 at 09:59;

 Status DC


Daptomycin 485 mg/ Sodium Chloride 50 ml @  100 mls/hr Q24H IV  Last 

administered on 5/11/20at 13:10;  Start 5/4/20 at 11:00;  Stop 5/12/20 at 07:44;

 Status DC


Potassium Chloride 75 meq/ Magnesium Sulfate 15 meq/Calcium Gluconate 8 meq/ 

Multivitamins 10 ml/Chromium/ Copper/Manganese/ Seleni/Zn 0.5 ml/ Insulin Human 

Regular 25 unit/ Total Parenteral Nutrition/Amino Acids/Dextrose/ Fat Emulsion 

Intravenous 1,920 ml @  80 mls/hr TPN  CONT IV  Last administered on 5/4/20at 

23:08;  Start 5/4/20 at 22:00;  Stop 5/5/20 at 21:59;  Status DC


Haloperidol Lactate (Haldol Inj) 3 mg 1X  ONCE IVP  Last administered on 

5/4/20at 14:37;  Start 5/4/20 at 14:30;  Stop 5/4/20 at 14:31;  Status DC


Hydromorphone HCl (Dilaudid) 1 mg PRN Q4HRS  PRN IVP PAIN Last administered on 

5/18/20at 06:25;  Start 5/4/20 at 19:00;  Stop 5/18/20 at 17:10;  Status DC


Potassium Chloride 75 meq/ Magnesium Sulfate 15 meq/Calcium Gluconate 8 meq/ 

Multivitamins 10 ml/Chromium/ Copper/Manganese/ Seleni/Zn 0.5 ml/ Insulin Human 

Regular 20 unit/ Total Parenteral Nutrition/Amino Acids/Dextrose/ Fat Emulsion 

Intravenous 1,920 ml @  80 mls/hr TPN  CONT IV  Last administered on 5/5/20at 

22:10;  Start 5/5/20 at 22:00;  Stop 5/6/20 at 21:59;  Status DC


Lidocaine HCl (Buffered Lidocaine 1%) 3 ml STK-MED ONCE .ROUTE ;  Start 5/6/20 

at 11:31;  Stop 5/6/20 at 11:31;  Status DC


Lidocaine HCl (Buffered Lidocaine 1%) 3 ml STK-MED ONCE .ROUTE ;  Start 5/6/20 

at 12:28;  Stop 5/6/20 at 12:29;  Status DC


Lidocaine HCl (Buffered Lidocaine 1%) 6 ml 1X  ONCE INJ  Last administered on 

5/6/20at 12:53;  Start 5/6/20 at 12:45;  Stop 5/6/20 at 12:46;  Status DC


Potassium Chloride 75 meq/ Magnesium Sulfate 15 meq/Calcium Gluconate 8 meq/ 

Multivitamins 10 ml/Chromium/ Copper/Manganese/ Seleni/Zn 0.5 ml/ Insulin Human 

Regular 20 unit/ Total Parenteral Nutrition/Amino Acids/Dextrose/ Fat Emulsion 

Intravenous 1,920 ml @  80 mls/hr TPN  CONT IV  Last administered on 5/6/20at 

22:00;  Start 5/6/20 at 22:00;  Stop 5/7/20 at 21:59;  Status DC


Potassium Chloride 75 meq/ Magnesium Sulfate 15 meq/Calcium Gluconate 8 meq/ 

Multivitamins 10 ml/Chromium/ Copper/Manganese/ Seleni/Zn 0.5 ml/ Insulin Human 

Regular 15 unit/ Total Parenteral Nutrition/Amino Acids/Dextrose/ Fat Emulsion 

Intravenous 1,920 ml @  80 mls/hr TPN  CONT IV  Last administered on 5/7/20at 

22:28;  Start 5/7/20 at 22:00;  Stop 5/8/20 at 21:59;  Status DC


Vecuronium Bromide (Norcuron Bolus) 6 mg PRN Q6HRS  PRN IV VENT ASYNCHRONY;  

Start 5/7/20 at 19:15;  Stop 5/7/20 at 19:35;  Status DC


Bumetanide (Bumex) 2 mg 1X  ONCE IV  Last administered on 5/7/20at 22:09;  Start

5/7/20 at 19:45;  Stop 5/7/20 at 19:46;  Status DC


Lidocaine HCl (Buffered Lidocaine 1%) 3 ml STK-MED ONCE .ROUTE ;  Start 5/8/20 

at 07:59;  Stop 5/8/20 at 07:59;  Status DC


Midazolam HCl (Versed) 5 mg STK-MED ONCE .ROUTE ;  Start 5/8/20 at 08:36;  Stop 

5/8/20 at 08:36;  Status DC


Fentanyl Citrate (Fentanyl 5ml Vial) 250 mcg STK-MED ONCE .ROUTE ;  Start 5/8/20

at 08:36;  Stop 5/8/20 at 08:37;  Status DC


Lidocaine HCl (Buffered Lidocaine 1%) 3 ml 1X  ONCE IJ  Last administered on 

5/8/20at 09:30;  Start 5/8/20 at 09:15;  Stop 5/8/20 at 09:16;  Status DC


Midazolam HCl (Versed) 5 mg 1X  ONCE IV  Last administered on 5/8/20at 09:30;  

Start 5/8/20 at 09:15;  Stop 5/8/20 at 09:16;  Status DC


Fentanyl Citrate (Fentanyl 5ml Vial) 250 mcg 1X  ONCE IV  Last administered on 

5/8/20at 09:30;  Start 5/8/20 at 09:15;  Stop 5/8/20 at 09:16;  Status DC


Bumetanide (Bumex) 2 mg DAILY IV  Last administered on 5/18/20at 08:07;  Start 

5/8/20 at 10:00;  Stop 5/18/20 at 17:15;  Status DC


Potassium Chloride 75 meq/ Magnesium Sulfate 15 meq/ Multivitamins 10 ml/Ch

romium/ Copper/Manganese/ Seleni/Zn 0.5 ml/ Insulin Human Regular 15 unit/ Total

Parenteral Nutrition/Amino Acids/Dextrose/ Fat Emulsion Intravenous 1,920 ml @  

80 mls/hr TPN  CONT IV  Last administered on 5/8/20at 21:59;  Start 5/8/20 at 

22:00;  Stop 5/9/20 at 21:59;  Status DC


Metoclopramide HCl (Reglan Vial) 10 mg PRN Q3HRS  PRN IVP NAUSEA/VOMITING-3rd 

choice Last administered on 5/14/20at 04:25;  Start 5/9/20 at 16:45


Potassium Chloride 75 meq/ Magnesium Sulfate 15 meq/ Multivitamins 10 

ml/Chromium/ Copper/Manganese/ Seleni/Zn 0.5 ml/ Insulin Human Regular 15 unit/ 

Total Parenteral Nutrition/Amino Acids/Dextrose/ Fat Emulsion Intravenous 1,920 

ml @  80 mls/hr TPN  CONT IV  Last administered on 5/9/20at 22:41;  Start 5/9/20

at 22:00;  Stop 5/10/20 at 21:59;  Status DC


Magnesium Sulfate 50 ml @ 25 mls/hr 1X  ONCE IV  Last administered on 5/10/20at 

10:44;  Start 5/10/20 at 09:00;  Stop 5/10/20 at 10:59;  Status DC


Potassium Chloride/Water 100 ml @  100 mls/hr 1X  ONCE IV  Last administered on 

5/10/20at 09:37;  Start 5/10/20 at 09:00;  Stop 5/10/20 at 09:59;  Status DC


Duloxetine HCl (Cymbalta) 30 mg DAILY PO  Last administered on 5/11/20at 09:48; 

Start 5/10/20 at 14:00;  Stop 5/13/20 at 10:25;  Status DC


Potassium Chloride 80 meq/ Magnesium Sulfate 20 meq/ Multivitamins 10 

ml/Chromium/ Copper/Manganese/ Seleni/Zn 0.5 ml/ Insulin Human Regular 15 unit/ 

Total Parenteral Nutrition/Amino Acids/Dextrose/ Fat Emulsion Intravenous 1,920 

ml @  80 mls/hr TPN  CONT IV  Last administered on 5/10/20at 21:42;  Start 5/10/

20 at 22:00;  Stop 5/11/20 at 21:59;  Status DC


Potassium Chloride 80 meq/ Magnesium Sulfate 20 meq/ Multivitamins 10 ml/Chromiu

m/ Copper/Manganese/ Seleni/Zn 0.5 ml/ Insulin Human Regular 15 unit/ Total 

Parenteral Nutrition/Amino Acids/Dextrose/ Fat Emulsion Intravenous 1,920 ml @  

80 mls/hr TPN  CONT IV  Last administered on 5/11/20at 22:20;  Start 5/11/20 at 

22:00;  Stop 5/12/20 at 21:59;  Status DC


Lidocaine HCl (Buffered Lidocaine 1%) 3 ml STK-MED ONCE .ROUTE ;  Start 5/12/20 

at 09:54;  Stop 5/12/20 at 09:55;  Status DC


Hydromorphone HCl (Dilaudid Standard PCA) 12 mg STK-MED ONCE IV ;  Start 5/1/20 

at 15:50;  Stop 5/12/20 at 11:24;  Status DC


Potassium Chloride 80 meq/ Magnesium Sulfate 20 meq/ Multivitamins 10 

ml/Chromium/ Copper/Manganese/ Seleni/Zn 0.5 ml/ Insulin Human Regular 15 unit/ 

Total Parenteral Nutrition/Amino Acids/Dextrose/ Fat Emulsion Intravenous 1,920 

ml @  80 mls/hr TPN  CONT IV  Last administered on 5/12/20at 21:40;  Start 

5/12/20 at 22:00;  Stop 5/13/20 at 21:59;  Status DC


Lidocaine HCl (Buffered Lidocaine 1%) 6 ml 1X  ONCE INJ  Last administered on 

5/12/20at 14:15;  Start 5/12/20 at 14:15;  Stop 5/12/20 at 14:16;  Status DC


Potassium Chloride 80 meq/ Magnesium Sulfate 20 meq/ Multivitamins 10 

ml/Chromium/ Copper/Manganese/ Seleni/Zn 1 ml/ Insulin Human Regular 15 unit/ 

Total Parenteral Nutrition/Amino Acids/Dextrose/ Fat Emulsion Intravenous 1,920 

ml @  80 mls/hr TPN  CONT IV  Last administered on 5/13/20at 22:04;  Start 

5/13/20 at 22:00;  Stop 5/14/20 at 21:59;  Status DC


Potassium Chloride/Water 100 ml @  100 mls/hr 1X  ONCE IV  Last administered on 

5/14/20at 11:34;  Start 5/14/20 at 11:00;  Stop 5/14/20 at 11:59;  Status DC


Potassium Chloride 90 meq/ Magnesium Sulfate 20 meq/ Multivitamins 10 

ml/Chromium/ Copper/Manganese/ Seleni/Zn 1 ml/ Insulin Human Regular 15 unit/ 

Total Parenteral Nutrition/Amino Acids/Dextrose/ Fat Emulsion Intravenous 1,920 

ml @  80 mls/hr TPN  CONT IV  Last administered on 5/14/20at 22:57;  Start 

5/14/20 at 22:00;  Stop 5/15/20 at 21:59;  Status DC


Potassium Chloride 90 meq/ Magnesium Sulfate 20 meq/ Multivitamins 10 

ml/Chromium/ Copper/Manganese/ Seleni/Zn 1 ml/ Insulin Human Regular 15 unit/ 

Total Parenteral Nutrition/Amino Acids/Dextrose/ Fat Emulsion Intravenous 1,920 

ml @  80 mls/hr TPN  CONT IV  Last administered on 5/15/20at 22:48;  Start 

5/15/20 at 22:00;  Stop 5/16/20 at 21:59;  Status DC


Potassium Chloride 90 meq/ Magnesium Sulfate 20 meq/ Multivitamins 10 

ml/Chromium/ Copper/Manganese/ Seleni/Zn 1 ml/ Insulin Human Regular 15 unit/ 

Total Parenteral Nutrition/Amino Acids/Dextrose/ Fat Emulsion Intravenous 1,890 

ml @  78.75 mls/ hr TPN  CONT IV  Last administered on 5/16/20at 22:15;  Start 

5/16/20 at 22:00;  Stop 5/17/20 at 21:59;  Status DC


Linezolid/Dextrose 300 ml @  300 mls/hr Q12HR IV  Last administered on 5/19/20at

21:08;  Start 5/17/20 at 09:00;  Stop 5/20/20 at 08:11;  Status DC


Daptomycin 450 mg/ Sodium Chloride 50 ml @  100 mls/hr Q24H IV  Last 

administered on 5/20/20at 09:25;  Start 5/17/20 at 09:00;  Stop 5/21/20 at 

08:30;  Status DC


Potassium Chloride 90 meq/ Magnesium Sulfate 20 meq/ Multivitamins 10 

ml/Chromium/ Copper/Manganese/ Seleni/Zn 1 ml/ Insulin Human Regular 15 unit/ 

Total Parenteral Nutrition/Amino Acids/Dextrose/ Fat Emulsion Intravenous 1,890 

ml @  78.75 mls/ hr TPN  CONT IV  Last administered on 5/17/20at 21:34;  Start 

5/17/20 at 22:00;  Stop 5/18/20 at 21:59;  Status DC


Lorazepam (Ativan Inj) 2 mg STK-MED ONCE .ROUTE ;  Start 5/17/20 at 14:58;  Stop

5/17/20 at 14:58;  Status DC


Metoprolol Tartrate (Lopressor Vial) 5 mg 1X  ONCE IVP  Last administered on 

5/17/20at 15:31;  Start 5/17/20 at 15:15;  Stop 5/17/20 at 15:16;  Status DC


Lorazepam (Ativan Inj) 2 mg 1X  ONCE IVP  Last administered on 5/17/20at 15:30; 

Start 5/17/20 at 15:15;  Stop 5/17/20 at 15:16;  Status DC


Enoxaparin Sodium (Lovenox 40mg Syringe) 40 mg Q24H SQ  Last administered on 

6/5/20at 17:44;  Start 5/17/20 at 17:00;  Stop 6/7/20 at 06:50;  Status DC


Lorazepam (Ativan Inj) 1 mg PRN Q4HRS  PRN IVP ANXIETY / AGITATION MILD-MOD Last

administered on 5/31/20at 15:55;  Start 5/17/20 at 19:15;  Stop 6/2/20 at 11:45;

 Status DC


Lorazepam (Ativan Inj) 2 mg PRN Q4HRS  PRN IVP ANXIETY / AGITATION SEVERE Last 

administered on 6/1/20at 07:55;  Start 5/17/20 at 19:15;  Stop 6/2/20 at 11:45; 

Status DC


Fentanyl Citrate (Fentanyl 2ml Vial) 50 mcg PRN Q4HRS  PRN IVP SEVERE PAIN Last 

administered on 6/13/20at 05:15;  Start 5/18/20 at 13:15;  Stop 6/14/20 at 09

:29;  Status DC


Fentanyl Citrate (Fentanyl 2ml Vial) 25 mcg PRN Q4HRS  PRN IVP MODERATE PAIN 

Last administered on 6/13/20at 00:27;  Start 5/18/20 at 13:15;  Stop 6/14/20 at 

09:30;  Status DC


Potassium Chloride 90 meq/ Magnesium Sulfate 20 meq/ Multivitamins 10 

ml/Chromium/ Copper/Manganese/ Seleni/Zn 1 ml/ Insulin Human Regular 15 unit/ 

Total Parenteral Nutrition/Amino Acids/Dextrose/ Fat Emulsion Intravenous 1,890 

ml @  78.75 mls/ hr TPN  CONT IV  Last administered on 5/18/20at 22:18;  Start 

5/18/20 at 22:00;  Stop 5/19/20 at 21:59;  Status DC


Furosemide (Lasix) 40 mg 1X  ONCE IVP  Last administered on 5/18/20at 21:51;  

Start 5/18/20 at 21:45;  Stop 5/18/20 at 21:48;  Status DC


Albumin Human 100 ml @  100 mls/hr 1X PRN  PRN IV SEE COMMENTS;  Start 5/19/20 a

t 01:30


Furosemide (Lasix) 40 mg [id number] IVP  Last administered on 6/3/20at 08:04;  Start 

5/19/20 at 14:00;  Stop 6/3/20 at 13:07;  Status DC


Potassium Chloride 90 meq/ Magnesium Sulfate 20 meq/ Multivitamins 10 

ml/Chromium/ Copper/Manganese/ Seleni/Zn 1 ml/ Insulin Human Regular 15 unit/ 

Total Parenteral Nutrition/Amino Acids/Dextrose/ Fat Emulsion Intravenous 1,800 

ml @  75 mls/hr TPN  CONT IV  Last administered on 5/19/20at 22:31;  Start 

5/19/20 at 22:00;  Stop 5/20/20 at 21:59;  Status DC


Potassium Chloride 90 meq/ Magnesium Sulfate 20 meq/ Multivitamins 10 

ml/Chromium/ Copper/Manganese/ Seleni/Zn 1 ml/ Insulin Human Regular 15 unit/ 

Total Parenteral Nutrition/Amino Acids/Dextrose/ Fat Emulsion Intravenous 1,800 

ml @  75 mls/hr TPN  CONT IV  Last administered on 5/20/20at 22:28;  Start 

5/20/20 at 22:00;  Stop 5/21/20 at 21:59;  Status DC


Potassium Chloride 110 meq/ Magnesium Sulfate 20 meq/ Multivitamins 10 

ml/Chromium/ Copper/Manganese/ Seleni/Zn 1 ml/ Insulin Human Regular 15 unit/ 

Total Parenteral Nutrition/Amino Acids/Dextrose/ Fat Emulsion Intravenous 1,800 

ml @  75 mls/hr TPN  CONT IV  Last administered on 5/21/20at 22:01;  Start 

5/21/20 at 22:00;  Stop 5/22/20 at 21:59;  Status DC


Saliva Substitute (Biotene Moisturizing Mouth) 2 spray PRN Q15MIN  PRN PO DRY 

MOUTH;  Start 5/21/20 at 11:00


Potassium Chloride 110 meq/ Magnesium Sulfate 20 meq/ Multivitamins 10 

ml/Chromium/ Copper/Manganese/ Seleni/Zn 1 ml/ Insulin Human Regular 15 unit/ 

Total Parenteral Nutrition/Amino Acids/Dextrose/ Fat Emulsion Intravenous 1,800 

ml @  75 mls/hr TPN  CONT IV  Last administered on 5/22/20at 22:21;  Start 

5/22/20 at 22:00;  Stop 5/23/20 at 21:59;  Status DC


Potassium Chloride 110 meq/ Magnesium Sulfate 20 meq/ Multivitamins 10 

ml/Chromium/ Copper/Manganese/ Seleni/Zn 1 ml/ Insulin Human Regular 15 unit/ 

Total Parenteral Nutrition/Amino Acids/Dextrose/ Fat Emulsion Intravenous 1,800 

ml @  75 mls/hr TPN  CONT IV  Last administered on 5/23/20at 22:04;  Start 

5/23/20 at 22:00;  Stop 5/24/20 at 21:59;  Status DC


Potassium Chloride 110 meq/ Magnesium Sulfate 20 meq/ Multivitamins 10 

ml/Chromium/ Copper/Manganese/ Seleni/Zn 1 ml/ Insulin Human Regular 15 unit/ 

Total Parenteral Nutrition/Amino Acids/Dextrose/ Fat Emulsion Intravenous 1,800 

ml @  75 mls/hr TPN  CONT IV  Last administered on 5/24/20at 22:48;  Start 

5/24/20 at 22:00;  Stop 5/25/20 at 21:59;  Status DC


Potassium Chloride 70 meq/ Magnesium Sulfate 20 meq/ Multivitamins 10 

ml/Chromium/ Copper/Manganese/ Seleni/Zn 1 ml/ Insulin Human Regular 15 unit/ 

Total Parenteral Nutrition/Amino Acids/Dextrose/ Fat Emulsion Intravenous 1,800 

ml @  75 mls/hr TPN  CONT IV  Last administered on 5/25/20at 21:39;  Start 

5/25/20 at 22:00;  Stop 5/26/20 at 21:59;  Status DC


Meropenem 500 mg/ Sodium Chloride 50 ml @  100 mls/hr Q6HRS IV  Last ad

ministered on 5/27/20at 06:02;  Start 5/25/20 at 18:00;  Stop 5/27/20 at 09:59; 

Status DC


Barium Sulfate (Varibar Thin Liquid Apple) 148 gm 1X  ONCE PO ;  Start 5/26/20 

at 11:45;  Stop 5/26/20 at 11:49;  Status DC


Potassium Chloride 70 meq/ Magnesium Sulfate 20 meq/ Multivitamins 10 

ml/Chromium/ Copper/Manganese/ Seleni/Zn 1 ml/ Insulin Human Regular 15 unit/ 

Total Parenteral Nutrition/Amino Acids/Dextrose/ Fat Emulsion Intravenous 1,800 

ml @  75 mls/hr TPN  CONT IV  Last administered on 5/26/20at 22:27;  Start 

5/26/20 at 22:00;  Stop 5/27/20 at 21:59;  Status DC


Piperacillin Sod/ Tazobactam Sod 3.375 gm/Sodium Chloride 50 ml @  100 mls/hr 

Q6HRS IV  Last administered on 6/4/20at 06:10;  Start 5/27/20 at 12:00;  Stop 

6/4/20 at 07:26;  Status DC


Potassium Chloride 70 meq/ Magnesium Sulfate 20 meq/ Multivitamins 10 

ml/Chromium/ Copper/Manganese/ Seleni/Zn 1 ml/ Insulin Human Regular 15 unit/ 

Total Parenteral Nutrition/Amino Acids/Dextrose/ Fat Emulsion Intravenous 1,800 

ml @  75 mls/hr TPN  CONT IV  Last administered on 5/27/20at 22:03;  Start 

5/27/20 at 22:00;  Stop 5/28/20 at 21:59;  Status DC


Potassium Chloride 70 meq/ Magnesium Sulfate 20 meq/ Multivitamins 10 

ml/Chromium/ Copper/Manganese/ Seleni/Zn 1 ml/ Insulin Human Regular 15 unit/ 

Total Parenteral Nutrition/Amino Acids/Dextrose/ Fat Emulsion Intravenous 1,800 

ml @  75 mls/hr TPN  CONT IV  Last administered on 5/28/20at 22:33;  Start 

5/28/20 at 22:00;  Stop 5/29/20 at 21:59;  Status DC


Potassium Chloride 70 meq/ Magnesium Sulfate 20 meq/ Multivitamins 10 

ml/Chromium/ Copper/Manganese/ Seleni/Zn 1 ml/ Insulin Human Regular 15 unit/ 

Total Parenteral Nutrition/Amino Acids/Dextrose/ Fat Emulsion Intravenous 1,800 

ml @  75 mls/hr TPN  CONT IV  Last administered on 5/29/20at 23:13;  Start 

5/29/20 at 22:00;  Stop 5/30/20 at 21:59;  Status DC


Potassium Chloride 80 meq/ Magnesium Sulfate 20 meq/ Multivitamins 10 

ml/Chromium/ Copper/Manganese/ Seleni/Zn 1 ml/ Insulin Human Regular 15 unit/ 

Total Parenteral Nutrition/Amino Acids/Dextrose/ Fat Emulsion Intravenous 1,800 

ml @  75 mls/hr TPN  CONT IV  Last administered on 5/30/20at 22:30;  Start 

5/30/20 at 22:00;  Stop 5/31/20 at 21:59;  Status DC


Potassium Chloride 80 meq/ Magnesium Sulfate 20 meq/ Multivitamins 10 

ml/Chromium/ Copper/Manganese/ Seleni/Zn 1 ml/ Insulin Human Regular 15 unit/ 

Total Parenteral Nutrition/Amino Acids/Dextrose/ Fat Emulsion Intravenous 1,800 

ml @  75 mls/hr TPN  CONT IV  Last administered on 5/31/20at 21:54;  Start 

5/31/20 at 22:00;  Stop 6/1/20 at 21:59;  Status DC


Potassium Chloride/Water 100 ml @  100 mls/hr 1X  ONCE IV  Last administered on 

6/1/20at 10:15;  Start 6/1/20 at 10:00;  Stop 6/1/20 at 10:59;  Status DC


Potassium Chloride 90 meq/ Magnesium Sulfate 20 meq/ Multivitamins 10 

ml/Chromium/ Copper/Manganese/ Seleni/Zn 1 ml/ Insulin Human Regular 20 unit/ 

Total Parenteral Nutrition/Amino Acids/Dextrose/ Fat Emulsion Intravenous 1,800 

ml @  75 mls/hr TPN  CONT IV  Last administered on 6/1/20at 22:28;  Start 6/1/20

at 22:00;  Stop 6/2/20 at 21:59;  Status DC


Potassium Chloride 90 meq/ Magnesium Sulfate 20 meq/ Multivitamins 10 

ml/Chromium/ Copper/Manganese/ Seleni/Zn 1 ml/ Insulin Human Regular 20 unit/ 

Total Parenteral Nutrition/Amino Acids/Dextrose/ Fat Emulsion Intravenous 1,800 

ml @  75 mls/hr TPN  CONT IV  Last administered on 6/2/20at 22:08;  Start 6/2/20

at 22:00;  Stop 6/3/20 at 21:59;  Status DC


Lorazepam (Ativan Inj) 0.25 mg PRN Q4HRS  PRN IVP ANXIETY / AGITATION Last 

administered on 6/13/20at 02:25;  Start 6/3/20 at 07:30


Potassium Chloride 90 meq/ Magnesium Sulfate 20 meq/ Multivitamins 10 

ml/Chromium/ Copper/Manganese/ Seleni/Zn 1 ml/ Insulin Human Regular 20 unit/ 

Total Parenteral Nutrition/Amino Acids/Dextrose/ Fat Emulsion Intravenous 1,800 

ml @  75 mls/hr TPN  CONT IV  Last administered on 6/3/20at 23:13;  Start 6/3/20

at 22:00;  Stop 6/4/20 at 21:59;  Status DC


Furosemide (Lasix) 40 mg DAILY IVP  Last administered on 6/5/20at 11:14;  Start 

6/3/20 at 13:30;  Stop 6/7/20 at 09:12;  Status DC


Fluoxetine HCl (PROzac) 20 mg QHS PEG  Last administered on 6/14/20at 21:32;  

Start 6/4/20 at 21:00


Fentanyl (Duragesic 50mcg/ Hr Patch) 1 patch Q72H TD  Last administered on 

6/4/20at 21:22;  Start 6/4/20 at 21:00;  Stop 6/13/20 at 12:00;  Status DC


Potassium Chloride 40 meq/ Potassium Acetate 60 meq/Magnesium Sulfate 10 meq/ 

Multivitamins 10 ml/Chromium/ Copper/Manganese/ Seleni/Zn 1 ml/ Insulin Human 

Regular 20 unit/ Total Parenteral Nutrition/Amino Acids/Dextrose/ Fat Emulsion 

Intravenous 1,800 ml @  75 mls/hr TPN  CONT IV  Last administered on 6/5/20at 

00:03;  Start 6/4/20 at 22:00;  Stop 6/5/20 at 21:59;  Status DC


Potassium Acetate 80 meq/Magnesium Sulfate 5 meq/ Multivitamins 10 ml/Chromium/ 

Copper/Manganese/ Seleni/Zn 1 ml/ Insulin Human Regular 20 unit/ Total 

Parenteral Nutrition/Amino Acids/Dextrose/ Fat Emulsion Intravenous 1,920 ml @  

80 mls/hr TPN  CONT IV  Last administered on 6/5/20at 21:59;  Start 6/5/20 at 

22:00;  Stop 6/6/20 at 21:59;  Status DC


Potassium Acetate 60 meq/Magnesium Sulfate 5 meq/ Multivitamins 10 ml/Chromium/ 

Copper/Manganese/ Seleni/Zn 1 ml/ Insulin Human Regular 30 unit/ Total 

Parenteral Nutrition/Amino Acids/Dextrose/ Fat Emulsion Intravenous 1,920 ml @  

80 mls/hr TPN  CONT IV  Last administered on 6/6/20at 21:54;  Start 6/6/20 at 

22:00;  Stop 6/7/20 at 21:59;  Status DC


Norepinephrine Bitartrate 8 mg/ Dextrose 258 ml @  13.332 mls/ hr CONT  PRN IV 

PER PROTOCOL Last administered on 6/7/20at 21:46;  Start 6/7/20 at 06:30


Albumin Human 500 ml @  125 mls/hr 1X  ONCE IV  Last administered on 6/7/20at 

08:10;  Start 6/7/20 at 08:15;  Stop 6/7/20 at 12:14;  Status DC


Potassium Acetate 40 meq/Magnesium Sulfate 5 meq/ Multivitamins 10 ml/Chromium/ 

Copper/Manganese/ Seleni/Zn 1 ml/ Insulin Human Regular 30 unit/ Total 

Parenteral Nutrition/Amino Acids/Dextrose/ Fat Emulsion Intravenous 1,920 ml @  

80 mls/hr TPN  CONT IV  Last administered on 6/7/20at 22:23;  Start 6/7/20 at 

22:00;  Stop 6/8/20 at 21:59;  Status DC


Meropenem 1 gm/ Sodium Chloride 100 ml @  200 mls/hr Q8HRS IV ;  Start 6/7/20 at

14:00;  Status Cancel


Meropenem 1 gm/ Sodium Chloride 100 ml @  200 mls/hr Q8HRS IV  Last administered

on 6/7/20at 11:04;  Start 6/7/20 at 10:00;  Stop 6/7/20 at 13:00;  Status DC


Meropenem 1 gm/ Sodium Chloride 100 ml @  200 mls/hr Q12HR IV  Last administered

on 6/15/20at 08:31;  Start 6/7/20 at 21:00


Sodium Chloride 1,000 ml @  1,000 mls/hr 1X  ONCE IV  Last administered on 

6/7/20at 11:06;  Start 6/7/20 at 10:45;  Stop 6/7/20 at 11:44;  Status DC


Micafungin Sodium 100 mg/Dextrose 100 ml @  100 mls/hr Q24H IV  Last 

administered on 6/15/20at 10:36;  Start 6/7/20 at 11:00


Daptomycin 410 mg/ Sodium Chloride 50 ml @  100 mls/hr Q24H IV  Last 

administered on 6/9/20at 13:33;  Start 6/7/20 at 14:00;  Stop 6/10/20 at 08:30; 

Status DC


Midazolam HCl (Versed) 2 mg STK-MED ONCE .ROUTE ;  Start 6/7/20 at 14:47;  Stop 

6/7/20 at 14:48;  Status DC


Fentanyl Citrate (Fentanyl 2ml Vial) 100 mcg STK-MED ONCE .ROUTE ;  Start 6/7/20

at 14:47;  Stop 6/7/20 at 14:48;  Status DC


Flumazenil (Romazicon) 0.5 mg STK-MED ONCE IV ;  Start 6/7/20 at 14:48;  Stop 

6/7/20 at 14:48;  Status DC


Naloxone HCl (Narcan) 0.4 mg STK-MED ONCE .ROUTE ;  Start 6/7/20 at 14:48;  Stop

6/7/20 at 14:48;  Status DC


Lidocaine HCl (Lidocaine 1% 20ml Vial) 20 ml STK-MED ONCE .ROUTE ;  Start 6/7/20

at 14:48;  Stop 6/7/20 at 14:48;  Status DC


Midazolam HCl (Versed) 2 mg 1X  ONCE IV  Last administered on 6/7/20at 15:28;  

Start 6/7/20 at 15:00;  Stop 6/7/20 at 15:01;  Status DC


Fentanyl Citrate (Fentanyl 2ml Vial) 100 mcg 1X  ONCE IV  Last administered on 

6/7/20at 15:28;  Start 6/7/20 at 15:00;  Stop 6/7/20 at 15:01;  Status DC


Lidocaine HCl (Lidocaine 1% 20ml Vial) 20 ml 1X  ONCE INJ  Last administered on 

6/7/20at 15:30;  Start 6/7/20 at 15:00;  Stop 6/7/20 at 15:01;  Status DC


Sodium Chloride 1,000 ml @  100 mls/hr Q10H IV  Last administered on 6/15/20at 

03:38;  Start 6/7/20 at 20:00


Sodium Bicarbonate (Sodium Bicarb Adult 8.4% Syr) 50 meq 1X  ONCE IV  Last 

administered on 6/7/20at 21:47;  Start 6/7/20 at 22:00;  Stop 6/7/20 at 22:01;  

Status DC


Potassium Acetate 40 meq/Magnesium Sulfate 5 meq/ Multivitamins 10 ml/Chromium/ 

Copper/Manganese/ Seleni/Zn 1 ml/ Insulin Human Regular 30 unit/ Total 

Parenteral Nutrition/Amino Acids/Dextrose/ Fat Emulsion Intravenous 1,920 ml @  

80 mls/hr TPN  CONT IV  Last administered on 6/8/20at 22:28;  Start 6/8/20 at 

22:00;  Stop 6/9/20 at 21:59;  Status DC


Sodium Chloride 500 ml @  500 mls/hr 1X  ONCE IV  Last administered on 6/9/20at 

06:39;  Start 6/9/20 at 06:45;  Stop 6/9/20 at 07:44;  Status DC


Potassium Acetate 40 meq/Magnesium Sulfate 5 meq/ Multivitamins 10 ml/Chromium/ 

Copper/Manganese/ Seleni/Zn 1 ml/ Insulin Human Regular 30 unit/ Total 

Parenteral Nutrition/Amino Acids/Dextrose/ Fat Emulsion Intravenous 1,920 ml @  

80 mls/hr TPN  CONT IV  Last administered on 6/9/20at 22:03;  Start 6/9/20 at 

22:00;  Stop 6/10/20 at 21:59;  Status DC


Metoprolol Tartrate (Lopressor Vial) 5 mg PRN Q6HRS  PRN IVP HYPERTENSION Last 

administered on 6/13/20at 04:03;  Start 6/10/20 at 09:00


Potassium Acetate 40 meq/Magnesium Sulfate 5 meq/ Multivitamins 10 ml/Chromium/ 

Copper/Manganese/ Seleni/Zn 1 ml/ Insulin Human Regular 30 unit/ Total 

Parenteral Nutrition/Amino Acids/Dextrose/ Fat Emulsion Intravenous 1,920 ml @  

80 mls/hr TPN  CONT IV  Last administered on 6/10/20at 21:26;  Start 6/10/20 at 

22:00;  Stop 6/11/20 at 21:59;  Status DC


Potassium Acetate 40 meq/Magnesium Sulfate 5 meq/ Multivitamins 10 ml/Chromium/ 

Copper/Manganese/ Seleni/Zn 1 ml/ Insulin Human Regular 30 unit/ Total 

Parenteral Nutrition/Amino Acids/Dextrose/ Fat Emulsion Intravenous 1,920 ml @  

80 mls/hr TPN  CONT IV  Last administered on 6/11/20at 23:23;  Start 6/11/20 at 

22:00;  Stop 6/12/20 at 21:59;  Status DC


Potassium Acetate 40 meq/Magnesium Sulfate 5 meq/ Multivitamins 10 ml/Chromium/ 

Copper/Manganese/ Seleni/Zn 1 ml/ Insulin Human Regular 30 unit/ Total 

Parenteral Nutrition/Amino Acids/Dextrose/ Fat Emulsion Intravenous 1,920 ml @  

80 mls/hr TPN  CONT IV  Last administered on 6/12/20at 21:35;  Start 6/12/20 at 

22:00;  Stop 6/13/20 at 21:59;  Status DC


Furosemide (Lasix) 20 mg 1X  ONCE IVP  Last administered on 6/13/20at 06:26;  

Start 6/13/20 at 06:15;  Stop 6/13/20 at 06:16;  Status DC


Methylprednisolone Sodium Succinate (SOLU-Medrol 125MG VIAL) 125 mg 1X  ONCE IV 

Last administered on 6/13/20at 06:26;  Start 6/13/20 at 06:15;  Stop 6/13/20 at 

06:16;  Status DC


Albuterol/ Ipratropium (Duoneb) 3 ml Q4HRS NEB  Last administered on 6/15/20at 

07:53;  Start 6/13/20 at 08:00


Fentanyl Citrate 30 ml @ 0 mls/hr CONT  PRN IV SEE PROTOCOL Last administered on

6/14/20at 22:19;  Start 6/13/20 at 06:00


Propofol 100 ml @ 0 mls/hr CONT  PRN IV SEE PROTOCOL Last administered on 

6/15/20at 13:10;  Start 6/13/20 at 06:00


Fentanyl Citrate (Fentanyl 2ml Vial) 25 mcg PRN Q1HR  PRN IV SEE COMMENTS;  

Start 6/13/20 at 06:00


Fentanyl Citrate (Fentanyl 2ml Vial) 50 mcg PRN Q1HR  PRN IV SEE COMMENTS;  

Start 6/13/20 at 06:00


Chlorhexidine Gluconate (Peridex) 15 ml BID MM ;  Start 6/13/20 at 09:00;  Stop 

6/13/20 at 07:58;  Status DC


Potassium Acetate 40 meq/Magnesium Sulfate 5 meq/ Multivitamins 10 ml/Chromium/ 

Copper/Manganese/ Seleni/Zn 1 ml/ Insulin Human Regular 30 unit/ Total 

Parenteral Nutrition/Amino Acids/Dextrose/ Fat Emulsion Intravenous 1,920 ml @  

80 mls/hr TPN  CONT IV  Last administered on 6/13/20at 21:19;  Start 6/13/20 at 

22:00;  Stop 6/14/20 at 21:59;  Status DC


Acetylcysteine (Mucomyst 20% Resp Treatment) 600 mg BID NEB  Last administered 

on 6/15/20at 07:53;  Start 6/13/20 at 21:00


Magnesium Sulfate 100 ml @  25 mls/hr 1X  ONCE IV  Last administered on 

6/13/20at 15:48;  Start 6/13/20 at 15:45;  Stop 6/13/20 at 19:44;  Status DC


Potassium Acetate 40 meq/Magnesium Sulfate 5 meq/ Multivitamins 10 ml/Chromium/ 

Copper/Manganese/ Seleni/Zn 1 ml/ Insulin Human Regular 30 unit/ Total 

Parenteral Nutrition/Amino Acids/Dextrose/ Fat Emulsion Intravenous 1,920 ml @  

80 mls/hr TPN  CONT IV  Last administered on 6/14/20at 21:35;  Start 6/14/20 at 

22:00;  Stop 6/15/20 at 21:59


Potassium Chloride/Water 100 ml @  100 mls/hr Q1H IV  Last administered on 

6/15/20at 08:31;  Start 6/15/20 at 07:00;  Stop 6/15/20 at 08:59;  Status DC


Potassium Acetate 40 meq/Magnesium Sulfate 5 meq/ Multivitamins 10 ml/Chromium/ 

Copper/Manganese/ Seleni/Zn 1 ml/ Insulin Human Regular 30 unit/ Total 

Parenteral Nutrition/Amino Acids/Dextrose/ Fat Emulsion Intravenous 1,920 ml @  

80 mls/hr TPN  CONT IV ;  Start 6/15/20 at 22:00;  Stop 6/16/20 at 21:59


Lidocaine HCl (Buffered Lidocaine 1%) 3 ml Filtrbox-MED ONCE .ROUTE ;  Start 6/15/20 

at 12:14;  Stop 6/15/20 at 12:14;  Status DC


Lidocaine HCl (Buffered Lidocaine 1%) 3 ml 1X  ONCE IJ  Last administered on 

6/15/20at 13:11;  Start 6/15/20 at 13:00;  Stop 6/15/20 at 13:01;  Status DC





Active Scripts


Active


Reported


Bisoprolol Fumarate 5 Mg Tablet 10 Mg PO DAILY


Vitals/I & O





Vital Sign - Last 24 Hours








 6/14/20 6/14/20 6/14/20 6/14/20





 14:00 15:00 16:00 16:00


 


Temp   97.5 





   97.5 


 


Pulse 82 80 77 77


 


Resp 20 20 20 


 


B/P (MAP) 138/79 (98) 152/78 (102) 126/68 (87) 


 


Pulse Ox 99 99 99 


 


O2 Delivery Ventilator Ventilator Ventilator 


 


    





    





 6/14/20 6/14/20 6/14/20 6/14/20





 16:00 16:27 17:00 17:55


 


Pulse   81 


 


Resp   20 


 


B/P (MAP)   144/75 (98) 


 


Pulse Ox  100 100 100


 


O2 Delivery Mechanical Ventilator Ventilator Ventilator Ventilator





 6/14/20 6/14/20 6/14/20 6/14/20





 18:00 19:00 20:00 20:00


 


Pulse 79 79 94 


 


Resp 20 20  


 


B/P (MAP) 130/70 (90) 144/75 (98)  


 


Pulse Ox 99 99  


 


O2 Delivery Ventilator Ventilator  Mechanical Ventilator





 6/14/20 6/14/20 6/14/20 6/14/20





 20:00 20:15 21:00 22:00


 


Temp 97.3   





 97.3   


 


Pulse 94  88 92


 


Resp 24  20 20


 


B/P (MAP) 130/98 (109)  126/67 (86) 154/78 (103)


 


Pulse Ox 99 100 100 100


 


O2 Delivery Ventilator Ventilator Ventilator Ventilator


 


    





    





 6/14/20 6/14/20 6/14/20 6/14/20





 22:19 22:49 23:00 23:42


 


Pulse   87 


 


Resp 20 20 20 


 


B/P (MAP)   145/77 (99) 


 


Pulse Ox 100 100 100 100


 


O2 Delivery Ventilator Ventilator Ventilator Ventilator





 6/15/20 6/15/20 6/15/20 6/15/20





 00:00 00:00 00:01 01:00


 


Temp   98.2 





   98.2 


 


Pulse  89 89 88


 


Resp   20 20


 


B/P (MAP)   154/72 (99) 143/72 (95)


 


Pulse Ox   100 100


 


O2 Delivery Mechanical Ventilator  Ventilator Ventilator


 


    





    





 6/15/20 6/15/20 6/15/20 6/15/20





 02:00 03:00 03:37 04:00


 


Pulse 85 88  95


 


Resp 20 20  


 


B/P (MAP) 165/75 (105) 159/79 (105)  


 


Pulse Ox 100 100 100 


 


O2 Delivery Ventilator Ventilator Ventilator 





 6/15/20 6/15/20 6/15/20 6/15/20





 04:00 04:00 05:00 06:00


 


Temp 98.3   





 98.3   


 


Pulse 95  93 97


 


Resp 20 20 20


 


B/P (MAP) 142/68 (92)  134/65 (88) 144/71 (95)


 


Pulse Ox 100  99 99


 


O2 Delivery Ventilator Mechanical Ventilator Ventilator Ventilator


 


    





    





 6/15/20 6/15/20 6/15/20 6/15/20





 07:00 07:34 07:54 08:00


 


Temp    98.5





    98.5


 


Pulse 90   98


 


Resp 20 20


 


B/P (MAP) 145/69 (94)   140/75 (96)


 


Pulse Ox 99  99 99


 


O2 Delivery Ventilator Mechanical Ventilator Ventilator Ventilator


 


    





    





 6/15/20 6/15/20 6/15/20 6/15/20





 08:00 09:00 10:00 11:00


 


Pulse  122 113 105


 


Resp  20 20 22


 


B/P (MAP)  150/76 (100) 126/70 (88) 124/67 (86)


 


Pulse Ox  100 100 100


 


O2 Delivery  Ventilator Ventilator Tracheal Collar





 6/15/20 6/15/20 6/15/20 6/15/20





 12:00 12:00 12:00 13:10


 


Temp  99.0  





  99.0  


 


Pulse  115  120


 


Resp  31  24


 


B/P (MAP)  140/74 (96)  154/80 (104)


 


Pulse Ox  98  97


 


O2 Delivery Trach Collar Tracheal Collar  Tracheal Collar


 


    





    





 6/15/20 6/15/20 6/15/20 6/15/20





 13:15 13:20 13:25 13:30


 


Pulse 121 123 120 122


 


Resp 22 22 22 22


 


B/P (MAP) 160/85 (110) 161/93 (115) 159/91 (113) 147/93 (111)


 


Pulse Ox 98 98 98 98


 


O2 Delivery Tracheal Collar Tracheal Collar Tracheal Collar Tracheal Collar





 6/15/20 6/15/20  





 13:35 13:40  


 


Pulse 119 121  


 


Resp 22 22  


 


B/P (MAP) 142/83 (102) 150/86 (107)  


 


Pulse Ox 98 97  


 


O2 Delivery Tracheal Collar Tracheal Collar  














Intake and Output   


 


 6/14/20 6/14/20 6/15/20





 15:00 23:00 07:00


 


Intake Total  1881.81 ml 1471.6 ml


 


Output Total 776 ml 505 ml 580 ml


 


Balance -776 ml 1376.81 ml 891.6 ml











Hemodynamically unstable?:  No


Is patient in severe pain?:  No


Is NPO status required?:  No











HIRAM BARRERA MD             Quintin 15, 2020 14:00

## 2020-06-15 NOTE — PDOC
Infectious Disease Note


Subjective


Subjective


Patient better


Continues to have blood stained drainage from drains


no fevers last 24 hours





ROS


ROS


o/w neg





Vital Sign


Vital Signs





Vital Signs








  Date Time  Temp Pulse Resp B/P (MAP) Pulse Ox O2 Delivery O2 Flow Rate FiO2


 


6/15/20 07:00  90 20 145/69 (94) 99 Ventilator  


 


6/15/20 04:00 98.3       





 98.3       











Physical Exam


PHYSICAL EXAM


GENERAL: More Alert and looks better


HEENT: NGT in place. Oral mucosa dry


NECK:  Tracheostomy 


LUNGS: Diminished aeration bases, no accessory muscle use 


HEART:  S1, S2, tachy, regular 


ABDOMEN: Mild distention, bowel sounds present, soft, grimaces to palpation 


Right lateral side drainage bag, 3 drains with bloodstained drainage.  Left side

with drainage from previous drain site


: [place] ( 6/ 7)


EXTREMITIES: Trace edema .no  cyanosis 


SKIN: no signs of gen rash 


CNS: Lethargic very weak


LUE-PICC (5/29) without signs of complications - art line without complications





Labs


Lab





Laboratory Tests








Test


 6/14/20


10:45 6/14/20


12:11 6/14/20


17:52 6/15/20


00:23


 


O2 Saturation 98 % (92-99)    


 


Arterial Blood pH


 7.45


(7.35-7.45) 


 


 





 


Arterial Blood pCO2 at


Patient Temp 34 mmHg


(35-46) 


 


 





 


Arterial Blood pO2 at Patient


Temp 146 mmHg


() 


 


 





 


Arterial Blood HCO3


 23 mmol/L


(21-28) 


 


 





 


Arterial Blood Base Excess


 -1 mmol/L


(-3-3) 


 


 





 


FiO2 40    


 


Glucose (Fingerstick)


 


 159 mg/dL


(70-99) 129 mg/dL


(70-99) 118 mg/dL


(70-99)


 


Test


 6/15/20


05:45 6/15/20


05:54 


 





 


White Blood Count


 8.1 x10^3/uL


(4.0-11.0) 


 


 





 


Red Blood Count


 3.28 x10^6/uL


(3.50-5.40) 


 


 





 


Hemoglobin


 9.3 g/dL


(12.0-15.5) 


 


 





 


Hematocrit


 27.8 %


(36.0-47.0) 


 


 





 


Mean Corpuscular Volume 85 fL ()    


 


Mean Corpuscular Hemoglobin 28 pg (25-35)    


 


Mean Corpuscular Hemoglobin


Concent 34 g/dL


(31-37) 


 


 





 


Red Cell Distribution Width


 18.6 %


(11.5-14.5) 


 


 





 


Platelet Count


 434 x10^3/uL


(140-400) 


 


 





 


Neutrophils (%) (Auto) 80 % (31-73)    


 


Lymphocytes (%) (Auto) 15 % (24-48)    


 


Monocytes (%) (Auto) 5 % (0-9)    


 


Eosinophils (%) (Auto) 1 % (0-3)    


 


Basophils (%) (Auto) 0 % (0-3)    


 


Neutrophils # (Auto)


 6.4 x10^3/uL


(1.8-7.7) 


 


 





 


Lymphocytes # (Auto)


 1.2 x10^3/uL


(1.0-4.8) 


 


 





 


Monocytes # (Auto)


 0.4 x10^3/uL


(0.0-1.1) 


 


 





 


Eosinophils # (Auto)


 0.1 x10^3/uL


(0.0-0.7) 


 


 





 


Basophils # (Auto)


 0.0 x10^3/uL


(0.0-0.2) 


 


 





 


Sodium Level


 143 mmol/L


(136-145) 


 


 





 


Potassium Level


 3.3 mmol/L


(3.5-5.1) 


 


 





 


Chloride Level


 111 mmol/L


() 


 


 





 


Carbon Dioxide Level


 23 mmol/L


(21-32) 


 


 





 


Anion Gap 9 (6-14)    


 


Blood Urea Nitrogen


 34 mg/dL


(7-20) 


 


 





 


Creatinine


 0.7 mg/dL


(0.6-1.0) 


 


 





 


Estimated GFR


(Cockcroft-Gault) 88.9 


 


 


 





 


BUN/Creatinine Ratio 49 (6-20)    


 


Glucose Level


 125 mg/dL


(70-99) 


 


 





 


Calcium Level


 8.8 mg/dL


(8.5-10.1) 


 


 





 


Magnesium Level


 1.8 mg/dL


(1.8-2.4) 


 


 





 


Total Bilirubin


 0.5 mg/dL


(0.2-1.0) 


 


 





 


Aspartate Amino Transf


(AST/SGOT) 62 U/L (15-37) 


 


 


 





 


Alanine Aminotransferase


(ALT/SGPT) 72 U/L (14-59) 


 


 


 





 


Alkaline Phosphatase


 132 U/L


() 


 


 





 


Total Protein


 4.5 g/dL


(6.4-8.2) 


 


 





 


Albumin


 1.1 g/dL


(3.4-5.0) 


 


 





 


Albumin/Globulin Ratio 0.3 (1.0-1.7)    


 


Glucose (Fingerstick)


 


 115 mg/dL


(70-99) 


 











Micro


6/7 





GRAM STAIN  Final  


        Final





        GRAM NEGATIVE RODS:MODERATE


        SQUAMOUS EPI CELL:NOT APPLICABLE


        PMN (WBCs):RARE


        YEAST:MODERATE


        Unless otherwise specified, Testing Performed by:


        [address] [address]


        For Inquires, the Physician may contact the Microbiology


        department at [phone]





  ANAEROBIC-AEROBIC CULTURE  Preliminary  


        Preliminary





        MANY GRAM NEGATIVE RODS on 06/09/20 at 1158


        FINAL ID= [PSEUDOMONAS AERUGINOSA]


        PSEUDOMONAS AERUGINOSA





  ANTIMICROBIAL SUSCEPTIBILITY  Preliminary  


        Comment





        NEG ANSON 56


        PSEUDOMONAS AERUGINOSA


        ANTIBIOTIC                        RESULT          INTERPRETATION


        AMIKACIN                          <=16                  S


        AZTREONAM                         >16                   R


        CEFTAZIDIME                       >16                   R


        CIPROFLOXACIN                     <=0.25                S


        CEFEPIME                          16                    I


        CEFTAZIDIME/AVIBACTAM             <=4                   S


        GENTAMICIN                        <=2                   S














                               ** CONTINUED ON NEXT PAGE **





-----------------------

---------------------------------------------------------------------





RUN DATE: 06/11/20                  [facility] *LIVE*               

  PAGE 2   


RUN TIME: 1016                            Specimen Inquiry                    


-------------

-------------------------------------------------------------------------------





SPEC: [id number]    PATIENT: JESENIA BEAN KEYON                [id number]  

(Continued)


 

--------------------------------------------------------------------------------


------------








 

--------------------------------------------------------------------------------


------------





  Procedure                         Result                                      

         


--------------------------------------------------------------

------------------------------





  ANTIMICROBIAL SUSCEPTIBILITY  Preliminary   (continued)


        LEVOFLOXACIN                      <=0.5                 S


        MEROPENEM                         <=1                   S


        PIPERACILLIN/TAZOBACTAM           64                    S


        TOBRAMYCIN                        <=2                   S


        Unless otherwise specified, Testing Performed by:


        [address] [address]


        For Inquires, the Physician may contact the Microbiology


        department at [phone]











CT Scan 6/6





IMPRESSION:


1. Removal of the percutaneous pigtail drainage catheters since the prior 


exam. Sequela of pancreatitis with extensive pseudocysts again 


demonstrated, the right-sided collections are slightly larger since the 


prior exam, the left-sided collections are stable. See above.


2. Moderate to large left pleural effusion with atelectasis and collapse 


of most of the left lower lobe, stable. Small right pleural effusion is 


stable.


3. Gallstone.





Objective


Assessment


Patient with prolonged hospitalization


Multiple medical problems


Multiple surgical procedures





6/7 fluid cult PSAE (MDRO),yeast moderate s/p drain times three


CXR with Left lung white-out 6/13 ? effusion


Acute hypoxic resp failure on 40 % and 5 PEEP


Transaminitis - mild


Fever improving


Acute pancreatitis with persistent  necrosis


CT a/p 4/9


    Increased ascites. Persistent evidence of necrotizing pancreatitis with 

fluid and phlegmon


   at the pancreas


   4/27 status post ROBERT drain placement; yeast


   5/6 fluid  candida parapsilosis fluid amylase high


CT 6/7


IMPRESSION:


1.   Sequela of pancreatitis with extensive pseudocysts again 


demonstrated, the right-sided collections are slightly larger since the 


prior exam, the left-sided collections are stable. 








Cholelithiasis with thickening of the gallbladder wall.


Leucocytosis - better


JED,Hyperkalemia, Metabolic acidosis off dialysis


Acute hypoxic resp failure ,bilateral pleural effusion and atelectasis


hypocalcemia 


Prediabetes


HTN


s/p trach





S/p thoracenteis 5/12





Plan


Plan of Care








Await Pulm F/u - may need thoracentesis - has increased secretions per nursing. 

? possible effusion - large on CT scan- currently AF/WBC nml


Would consider repeat CT scan abd/pel since drain placed 6/7  and now some 

drainage from previous drain site.  Await surgical F/u


Sputum from 6/13 - growing GNR - reviewed gramstain with micro and not growing 

bacteria c/w GN coccobacilli


Continue meropenem, has MDRP PSAE June 7 from abd


cont micafungin June 7


Follow-up cultures fluid  for yeast





Monitor a.m. labs


General surgery following


Monitor drain output


Maintain aspiration precaution


Supportive care





[facility] micro [phone]








Critically ill








D/w nursing











RASHAWN ROSEN MD              Quintin 15, 2020 07:43

## 2020-06-15 NOTE — RAD
EXAM: CT CHEST, ABDOMEN, AND PELVIS WITHOUT CONTRAST

 

INDICATION: Pleural effusion and follow-up abdominal abscess

 

COMPARISON:  CT abdomen and pelvis 6/6/2020 and 5/27/2020.

 

TECHNIQUE: Helical CT imaging performed of the chest, abdomen and pelvis 

without the use of intravenous contrast. Sagittal and coronal reformats 

were obtained. 

 

One or more of the following individualized dose reduction techniques were

utilized for this examination:  

 

1. Automated exposure control

2. Adjustment of the mA and/or kV according to patient size

3. Use of iterative reconstruction technique.

 

 

FINDINGS:

 

CHEST:

 

Thyroid gland and thoracic inlet: Visualized portion of the thyroid gland 

is normal.

 

Heart and great vessels: Heart is normal in size. No pericardial effusion.

Thoracic aorta is normal in caliber. A left PICC terminates at the 

superior cavoatrial junction.

 

Mediastinum and drew: No lymphadenopathy. A tracheostomy tube terminates 

at the level of the clavicular heads. A nasogastric tube terminates in the

gastric fundus.

 

Lungs and pleura: Slightly increased moderate to large left pleural 

effusion, predominantly layering with a small partially loculated 

component anterolaterally, and consolidation or atelectasis of the entire 

left lower lobe. Mild opacities in the left upper lobe. There is a small 

right pleural effusion with mild consolidative opacities, also mildly 

increased from prior exam. 

 

Chest wall and axillae: Bilateral breast implants are noted. No axillary 

lymphadenopathy.

 

Bones: No acute osseous abnormality.

 

 

ABDOMEN AND PELVIS:

 

Liver: Liver is normal in size. A 1.2 cm hypodensity in the medial left 

hepatic lobe is unchanged.

 

Gallbladder/Biliary Tree: Cholelithiasis is unchanged. No biliary duct 

dilatation.

 

Pancreas: A large fluid collection in the pancreatic bed has slightly 

decreased in size, described below, the pancreas itself is difficult to 

visualize, which could be due to necrosis or obscuration of pancreatic 

parenchyma from the surrounding fluid collection. Evaluation is also 

limited due to lack of intravenous contrast. There is no gas within the 

pancreatic bed.

 

Spleen: Grossly normal.

 

Adrenal Glands: Normal.

 

Kidneys/Ureters/Bladder: There is unchanged mild right hydronephrosis. No 

urolithiasis. Ureters not well visualized due to fluid collections. The 

left kidney is normal. Bladder is decompressed around a [place] catheter.

 

Reproductive Organs: Uterus is anteverted. No adnexal mass. 

 

Stomach, small bowel, and colon: Nasogastric tube terminates in the 

gastric fundus. Stomach, small bowel, and colon are not well evaluated due

to lack of IV and oral contrast and presence of multiple adjacent fluid 

collections. There is no evidence of bowel obstruction.

 

Vasculature: Abdominal aorta is normal in caliber.

 

Lymph Nodes: No lymphadenopathy.

 

Peritoneum and retroperitoneum: There are multiple large fluid collections

throughout the abdomen and pelvis, some of which are likely communicating 

with each other. The fluid collection in the pancreatic bed involving the 

lesser sac is slightly smaller, now measuring approximately 12.7 x 4.5 cm 

at the level of the lesser sac (image 38, series 4), previously 13.4 x 5.5

cm. There is a new pigtail catheter in this fluid collection.

 

This fluid collection likely communicates with a fluid collection 

involving the right retroperitoneal space and right psoas muscle. This 

fluid collection measures approximately 10.1 x 9.4 cm, previously 10.3 x 

9.7 cm. There is a new pigtail catheter in this collection.

 

A fluid collection in the left paracolic gutter involving the left psoas 

muscle has slightly decreased in size, this measures 10.4 x 6.7 cm at the 

level of the inferior left renal pole, previously 11.7 x 6.7 cm. There is 

a new pleural catheter in this collection.

 

There is a suspected additional fluid collection in the lower midpelvis 

between loops of bowel and abutting the bladder and uterus, measuring 

approximately 10.0 x 4.0 cm, unchanged. This could be confirmed by oral 

contrast to differentiate from a loop of bowel.

 

No definite new fluid collection. There is a persistent small amount of 

free fluid in the pelvis. 

No free air.

 

Bones: No acute osseous abnormality.

 

Miscellaneous: Moderate new anasarca.

 

 

IMPRESSION:

 

1.  Sequela of pancreatitis with extensive pseudocyst/fluid collections 

throughout the abdomen and pelvis including a large peripancreatic fluid 

collection and bilateral retroperitoneal collections involving the psoas 

muscles. Fluid collections are unchanged to minimally decreased from CT 

6/6/2020. There are 3 new pigtail drainage catheters in place. IV or oral 

contrast may be useful to better evaluate, if possible.

 

2. Slightly increased moderate to large partially loculated left pleural 

effusion with atelectasis or consolidation of the left lower lobe. 

Slightly increased small right pleural effusion and mild right pulmonary 

opacities.

 

3. Mild right hydronephrosis.

 

4. Cholelithiasis.

 

5. New anasarca.

 

Electronically signed by: Annette Bone MD (6/15/2020 10:37 AM) 

[id number]

## 2020-06-16 VITALS — DIASTOLIC BLOOD PRESSURE: 74 MMHG | SYSTOLIC BLOOD PRESSURE: 140 MMHG

## 2020-06-16 VITALS — DIASTOLIC BLOOD PRESSURE: 76 MMHG | SYSTOLIC BLOOD PRESSURE: 140 MMHG

## 2020-06-16 VITALS — SYSTOLIC BLOOD PRESSURE: 164 MMHG | DIASTOLIC BLOOD PRESSURE: 68 MMHG

## 2020-06-16 VITALS — DIASTOLIC BLOOD PRESSURE: 67 MMHG | SYSTOLIC BLOOD PRESSURE: 130 MMHG

## 2020-06-16 VITALS — SYSTOLIC BLOOD PRESSURE: 103 MMHG | DIASTOLIC BLOOD PRESSURE: 54 MMHG

## 2020-06-16 VITALS — DIASTOLIC BLOOD PRESSURE: 61 MMHG | SYSTOLIC BLOOD PRESSURE: 123 MMHG

## 2020-06-16 VITALS — SYSTOLIC BLOOD PRESSURE: 118 MMHG | DIASTOLIC BLOOD PRESSURE: 60 MMHG

## 2020-06-16 VITALS — DIASTOLIC BLOOD PRESSURE: 62 MMHG | SYSTOLIC BLOOD PRESSURE: 115 MMHG

## 2020-06-16 VITALS — DIASTOLIC BLOOD PRESSURE: 88 MMHG | SYSTOLIC BLOOD PRESSURE: 168 MMHG

## 2020-06-16 VITALS — SYSTOLIC BLOOD PRESSURE: 119 MMHG | DIASTOLIC BLOOD PRESSURE: 58 MMHG

## 2020-06-16 VITALS — SYSTOLIC BLOOD PRESSURE: 124 MMHG | DIASTOLIC BLOOD PRESSURE: 60 MMHG

## 2020-06-16 VITALS — SYSTOLIC BLOOD PRESSURE: 121 MMHG | DIASTOLIC BLOOD PRESSURE: 67 MMHG

## 2020-06-16 VITALS — DIASTOLIC BLOOD PRESSURE: 68 MMHG | SYSTOLIC BLOOD PRESSURE: 136 MMHG

## 2020-06-16 VITALS — DIASTOLIC BLOOD PRESSURE: 80 MMHG | SYSTOLIC BLOOD PRESSURE: 124 MMHG

## 2020-06-16 VITALS — SYSTOLIC BLOOD PRESSURE: 134 MMHG | DIASTOLIC BLOOD PRESSURE: 62 MMHG

## 2020-06-16 VITALS — SYSTOLIC BLOOD PRESSURE: 138 MMHG | DIASTOLIC BLOOD PRESSURE: 72 MMHG

## 2020-06-16 VITALS — DIASTOLIC BLOOD PRESSURE: 72 MMHG | SYSTOLIC BLOOD PRESSURE: 132 MMHG

## 2020-06-16 VITALS — SYSTOLIC BLOOD PRESSURE: 131 MMHG | DIASTOLIC BLOOD PRESSURE: 67 MMHG

## 2020-06-16 VITALS — SYSTOLIC BLOOD PRESSURE: 116 MMHG | DIASTOLIC BLOOD PRESSURE: 58 MMHG

## 2020-06-16 VITALS — SYSTOLIC BLOOD PRESSURE: 127 MMHG | DIASTOLIC BLOOD PRESSURE: 66 MMHG

## 2020-06-16 VITALS — SYSTOLIC BLOOD PRESSURE: 116 MMHG | DIASTOLIC BLOOD PRESSURE: 88 MMHG

## 2020-06-16 VITALS — DIASTOLIC BLOOD PRESSURE: 57 MMHG | SYSTOLIC BLOOD PRESSURE: 115 MMHG

## 2020-06-16 VITALS — SYSTOLIC BLOOD PRESSURE: 136 MMHG | DIASTOLIC BLOOD PRESSURE: 72 MMHG

## 2020-06-16 LAB
ANION GAP SERPL CALC-SCNC: 10 MMOL/L (ref 6–14)
BASE EXCESS ABG: -4 MMOL/L (ref -3–3)
BASOPHILS # BLD AUTO: 0 X10^3/UL (ref 0–0.2)
BASOPHILS NFR BLD: 0 % (ref 0–3)
BUN SERPL-MCNC: 28 MG/DL (ref 7–20)
CALCIUM SERPL-MCNC: 8.9 MG/DL (ref 8.5–10.1)
CHLORIDE SERPL-SCNC: 108 MMOL/L (ref 98–107)
CO2 SERPL-SCNC: 23 MMOL/L (ref 21–32)
CREAT SERPL-MCNC: 0.7 MG/DL (ref 0.6–1)
EOSINOPHIL NFR BLD: 0.1 X10^3/UL (ref 0–0.7)
EOSINOPHIL NFR BLD: 1 % (ref 0–3)
ERYTHROCYTE [DISTWIDTH] IN BLOOD BY AUTOMATED COUNT: 18.6 % (ref 11.5–14.5)
GFR SERPLBLD BASED ON 1.73 SQ M-ARVRAT: 88.9 ML/MIN
GLUCOSE SERPL-MCNC: 129 MG/DL (ref 70–99)
HCO3 BLDA-SCNC: 20 MMOL/L (ref 21–28)
HCT VFR BLD CALC: 28.9 % (ref 36–47)
HGB BLD-MCNC: 9.7 G/DL (ref 12–15.5)
LYMPHOCYTES # BLD: 1.3 X10^3/UL (ref 1–4.8)
LYMPHOCYTES NFR BLD AUTO: 16 % (ref 24–48)
MAGNESIUM SERPL-MCNC: 1.5 MG/DL (ref 1.8–2.4)
MCH RBC QN AUTO: 29 PG (ref 25–35)
MCHC RBC AUTO-ENTMCNC: 34 G/DL (ref 31–37)
MCV RBC AUTO: 86 FL (ref 79–100)
MONO #: 0.3 X10^3/UL (ref 0–1.1)
MONOCYTES NFR BLD: 4 % (ref 0–9)
NEUT #: 6.6 X10^3/UL (ref 1.8–7.7)
NEUTROPHILS NFR BLD AUTO: 79 % (ref 31–73)
PCO2 BLDA: 34 MMHG (ref 35–46)
PLATELET # BLD AUTO: 432 X10^3/UL (ref 140–400)
PO2 BLDA: 121 MMHG (ref 75–108)
POTASSIUM SERPL-SCNC: 3.6 MMOL/L (ref 3.5–5.1)
RBC # BLD AUTO: 3.38 X10^6/UL (ref 3.5–5.4)
SAO2 % BLDA: 98 % (ref 92–99)
SODIUM SERPL-SCNC: 141 MMOL/L (ref 136–145)
TRIGL SERPL-MCNC: 251 MG/DL (ref 0–150)
WBC # BLD AUTO: 8.4 X10^3/UL (ref 4–11)

## 2020-06-16 RX ADMIN — ONDANSETRON PRN MG: 2 INJECTION INTRAMUSCULAR; INTRAVENOUS at 08:21

## 2020-06-16 RX ADMIN — IPRATROPIUM BROMIDE AND ALBUTEROL SULFATE SCH ML: .5; 3 SOLUTION RESPIRATORY (INHALATION) at 12:00

## 2020-06-16 RX ADMIN — IPRATROPIUM BROMIDE AND ALBUTEROL SULFATE SCH ML: .5; 3 SOLUTION RESPIRATORY (INHALATION) at 23:30

## 2020-06-16 RX ADMIN — DILTIAZEM HYDROCHLORIDE SCH MLS/HR: 5 INJECTION INTRAVENOUS at 10:43

## 2020-06-16 RX ADMIN — MAGNESIUM SULFATE IN WATER PRN MLS/HR: 40 INJECTION, SOLUTION INTRAVENOUS at 08:21

## 2020-06-16 RX ADMIN — INSULIN LISPRO SCH UNITS: 100 INJECTION, SOLUTION INTRAVENOUS; SUBCUTANEOUS at 00:00

## 2020-06-16 RX ADMIN — ACETYLCYSTEINE SCH MG: 200 INHALANT RESPIRATORY (INHALATION) at 08:55

## 2020-06-16 RX ADMIN — DILTIAZEM HYDROCHLORIDE SCH MLS/HR: 5 INJECTION INTRAVENOUS at 09:27

## 2020-06-16 RX ADMIN — INSULIN LISPRO SCH UNITS: 100 INJECTION, SOLUTION INTRAVENOUS; SUBCUTANEOUS at 18:00

## 2020-06-16 RX ADMIN — IPRATROPIUM BROMIDE AND ALBUTEROL SULFATE SCH ML: .5; 3 SOLUTION RESPIRATORY (INHALATION) at 08:00

## 2020-06-16 RX ADMIN — ACETYLCYSTEINE SCH MG: 200 INHALANT RESPIRATORY (INHALATION) at 20:36

## 2020-06-16 RX ADMIN — IPRATROPIUM BROMIDE AND ALBUTEROL SULFATE SCH ML: .5; 3 SOLUTION RESPIRATORY (INHALATION) at 20:36

## 2020-06-16 RX ADMIN — ONDANSETRON PRN MG: 2 INJECTION INTRAMUSCULAR; INTRAVENOUS at 02:30

## 2020-06-16 RX ADMIN — DILTIAZEM HYDROCHLORIDE SCH MLS/HR: 5 INJECTION INTRAVENOUS at 21:30

## 2020-06-16 RX ADMIN — INSULIN LISPRO SCH UNITS: 100 INJECTION, SOLUTION INTRAVENOUS; SUBCUTANEOUS at 06:00

## 2020-06-16 RX ADMIN — PROPOFOL PRN MLS/HR: 10 INJECTION, EMULSION INTRAVENOUS at 22:26

## 2020-06-16 RX ADMIN — PROCHLORPERAZINE EDISYLATE PRN MG: 5 INJECTION INTRAMUSCULAR; INTRAVENOUS at 15:49

## 2020-06-16 RX ADMIN — IPRATROPIUM BROMIDE AND ALBUTEROL SULFATE SCH ML: .5; 3 SOLUTION RESPIRATORY (INHALATION) at 16:00

## 2020-06-16 RX ADMIN — BACITRACIN SCH MLS/HR: 5000 INJECTION, POWDER, FOR SOLUTION INTRAMUSCULAR at 07:30

## 2020-06-16 RX ADMIN — INSULIN LISPRO SCH UNITS: 100 INJECTION, SOLUTION INTRAVENOUS; SUBCUTANEOUS at 12:20

## 2020-06-16 RX ADMIN — Medication PRN EACH: at 10:11

## 2020-06-16 RX ADMIN — PANTOPRAZOLE SODIUM SCH MG: 40 INJECTION, POWDER, FOR SOLUTION INTRAVENOUS at 08:18

## 2020-06-16 NOTE — PDOC
PROGRESS NOTES


Chief Complaint


Chief Complaint


A/P:


Acute hypoxic Respiratory failure requiring mechanical ventilation (now 

extubated for several days but still with tracheostomy)


Tracheostomy


bilateral pleural effusions/pulm edema s/p Throacentesis on 6/15/2020


Severe Acute gallstone pancreatitis (not a surgical candidate at this time) with

necrosis


Acute kidney failure now requiring dialysis


Gallstones (Calculus of gallbladder with acute cholecystitis without ob

struction)


HTN 


Intractable pain


Intractable nausea


Covid 19 negative. 


Acute on chronic anemia 


EEG: No seizure activityFever  - better currently - intermittent could be from 

underlying pancreatitis blood cults 5/4 - neg so far


? Ileus with vomiting


Abd distention - U/S and CT reviewed s/p 0.4 L of opaque, debris-containing 

ascites was removed 5/6


Acute pancreatitis with persistent necrosis


- 4/27 status post ROBERT drain placement + C paropsilosis. s/p additional drains 

5/8


Anemia - S/p PRBCs


Cholelithiasis with thickening of the gallbladder wall.


Leucocytosis improving


JED, hyperkalemia, Metabolic acidosis off dialysis


hypocalcemia 


Prediabetes


HTN


s/p trach


ESRD on HD


Hyperglycemia





FEN - 


PPX - SCDs, off lovenox currently, high risk for thrombosis but in light of her 

recent anemia and need for transfusion the risks do not outweigh benefits at 

this time. will continue to evaluate on a daily basis


FULL CODE


Dispo - ICU, critically ill





History of Present Illness


History of Present Illness


6/8: IR placed drain on 6/7. 4u PRBC after Hb drop. Hb 8.8 today. Off Levophed 

this morning. T-max 100.3. Much more lethargic today. CXR with left sided 

diffuse infiltrates.


6/9: Tachycardic overnight into the 140s. NGT clamped. On BIPAP currently. 

Drains with serosanguinous discharge. WBC 8, Tmax 99.6F.


6/10: Seen on trach shield in ICU.  Hypertensive and tachycardic. Labs stable. 

blood stained drainage from drains. Afebrile.


6/11: Seen on trach shield in ICU. She is a bit confused, drowsy, but when 

sitting up is conversational and confusion somewhat clears. She is asking for 

more pain medication. Stable drains, still very tachy.Na 147


6/12: Patient vomited overnight. Aspirated. Tried to pull her trach out, she was

told she would die without her trach, she said "I know, I just want to go home".

Hb 7.6. Afebrile, still very tachycardic. 1055ml out of right sided ROBERT drain


6/13: Overnight hypoxic, on BIPAP. CXR with left sided white out lung. 

Significant mucous plug suctioned by RT with improvement in her ABG after 2 

hours this morning. Not really active, tired, lethargic. 890ml out of drains 

past 24 hours. On vent. D/w daughter bedside.


6/14: Still on vent overnight with copious pulmonary drainage on suctioning. 

Labs stable, UOP stable 1L. Drain output still significant with 1505mL. She has 

shown her phone password [id number] and made it clear she does not want her daughters 

to access her phone at this time.


6:15: Patient quite frail, she has had such a lengthy hospital stay that she is 

certainly depressed and as documented 3 days ago is wanting to go home. Most 

likely patient is also tired of being hospitalized with an end point no where in

sight. Reassurance and encouragement provided during my visit. Plans for 

thoracentesis later in the day 


6/16: No acute events reported overnight, case discussed with nursing staff 

patient in no acute distress, complaints of the abdomimal pain during my visit, 

patient is quite depressed, reassurance has been provided, discussed with 

nursing staff at bedside, replace electrolytes 





6/5/2020


Patient seen and examined on telemetry floor today


This morning I had a couple of discussions with case management


The issue is the patient will not wear her trach cap


Without that she cannot advance her diet and is currently supposed to be on 

honey thick liquids


I was going to talk to the patient about wearing her trach But when I arrived to

the room she was lying on the floor with several nurses and therapist around


Apparently she did walk to the end of the garsia then back and then collapsed onto

the floor


She had a bowel movement when this all happened


Appears to be critically ill again


Very shaky tremulous anxious has a stare in her eyes


We got her back in bed


I am extremely concerned about her long-term prognosis again











6/4/2020


Patient seen and examined in the ICU


She remains critically ill is is extremely weak


Chart reviewed


Discussed with RN


We decided to try some Prozac as she does seem depressed


On IV TPN


Still has NG tube








6/3/2020


Patient seen and examined in the ICU


She remains critically ill


We have been trying to hold off on her Ativan for the past 24 hours


She is a little more awake but shaky and agitated and anxious seems depressed


Discussed with RN


Chart reviewed








6/2/2020


Patient seen and examined in the ICU


She is a little more alert today but still quite ill


Appears clammy and pale and depressed/anxious


Discussed with RN


Chart reviewed











6/1/2020


Patient seen and examined in the ICU


She appears extremely ill


She is tachypneic at 35 respirations per minute and tachycardic at 132 bpm


She is extremely encephalopathic and shaky


She appears clammy


Chart reviewed


Discussed with RN


Prognosis extremely guarded at best








5/31/2020


Patient still in ICU


Resting with no apparent distress


Chart reviewed








5/30/2020


Patient seen and examined in the ICU


She is wiping her face with a cough


Discussed with RN


Chart reviewed


We hope to get her out of the ICU later today if possible








5/29/2020


Patient seen and examined in the ICU once again


She is back on NG suction


On IV Zosyn


Has IV TPN


Sedated with Precedex but anxious still


Appears somewhat clammy and pale


Chart reviewed


Discussed with RN


She remains critically ill














5/28/2020


Patient seen and examined in the ICU


She has an NG that is clamped we are hoping to start some clear liquids but she 

looks quite ill


She is on IV TPN


Meropenem changed to IV Zosyn (agree)


She has [place] to bedside drainage


She is semi-sedated with Precedex


Chart reviewed


Discussed with RN


She remains critically ill











Seen bedside. Hb 8. She was just a bit hypoxic, had a mucous plug suctioned. 

Able to vocalize well with speaking valve, tells me we are being very hard on 

her and she would like to be drugged back to sleep.  She wants to wake up and 

feel better. On trach shield, 


T max 100.4. afebrile this a.m.





5/26/2020


Patient seen and examined in the ICU


She is up in the chair very frail trying to talk a little shaky


Discussed with RN


Chart reviewed








5/25/2020


Patient seen and examined in the ICU


Patient up in the chair


Having a severe coughing episode with a lot of phlegm coming out of her 

tracheostomy


Discussed with RN


Discussed with physical therapy


Chart reviewed











5/24,.    feels well,  has been out of room in wheelchair


no complaint,    still weak,   some with not wanting to wear her valve on trach


cont other,   may be able to work with speech tomorrow,    ketty OK to 

try, 








5/23,  anxiety is up today,   she dislikes the valve still,    


shower and outside today


.   





5/22 she doesnt want to wear her passy-kristan valve,  discussed str and plan with 

her.


speech following,  needs swallow study,   but needs to wear her valve longer,  


cont current


able to walk some, walker 





5/21  stronger,   better,   


we discussed better oral care


she would like to try swallow study,  wants to try to eat,    speech is 

following








5/20/2020 


She remains in the ICU


sitting up and working with OT,   getting better


if limit pain meds, may do better off the vent, 





Nurses trying to suction her,  that is also improved, 


Chart reviewed


 








5/19/2020


Patient seen and examined in the ICU


She had an episode yesterday of tachycardia and severe agitation we gave her 

some Ativan


After that she seemed to have stroke symptoms but now that the Ativan has wore 

off her stroke symptoms have resolved


 


 


She is on IV meropenem and daptomycin and micafungin


Chart reviewed


Discussed with RN


Patient is still critically ill


 


BRIEF OPERATIVE NOTE


Pre-Op Diagnosis


Pancreatitis with pseudocysts, suspected infection


Post-Op Diagnosis


same


Procedure Performed


CT abdominal Drains x 3


Surgeon


Hardy


Anesthesia Type:  Conscious Sedation


Findings


3 abdominal drains, 14F, with turbid pancreatic fluid and necrotic debris in 

each.


Complications


No immediate








5/9: Patient today somewhat restless and having bilious secretions from ET tube,

imaging studies ordered, discussed with consultant. Pretty poor prognosis, 

hopefully is not a fistula, poor surgical candidate. 





5/10: Imaging with no acute events, she seems more stable today compared to 

yesterday. Encouraged as much activity as possible patient at high risk for 

severe depression.





Vitals


Vitals





Vital Signs








  Date Time  Temp Pulse Resp B/P (MAP) Pulse Ox O2 Delivery O2 Flow Rate FiO2


 


6/16/20 08:30     95 Tracheal Collar  


 


6/16/20 08:00 97.7 108 20 140/76 (97)    





 97.7       


 


6/15/20 20:29       9.0 











Physical Exam


Physical Exam


GENERAL: More Alert and looks comfortable


HEENT: NGT in place. Oral mucosa dry


NECK:  Tracheostomy 


LUNGS: Diminished aeration bases, no accessory muscle use - CT on left with 

clear fluid


HEART:  S1, S2, tachy, regular 


ABDOMEN: Mild distention, bowel sounds present, soft, grimaces to palpation 


Right lateral side drainage bag, 3 drains with bloodstained drainage.  Left side

with drainage from previous drain site - dressed


: [place] ( 6/ 7)


EXTREMITIES: Trace edema .no  cyanosis 


SKIN: no signs of gen rash 


CNS: Lethargic very weak


LUE-PICC (5/29) without signs of complications - art line without complications


General:  Cooperative


Heart:  Regular rate, Normal S1, Normal S2, No murmurs, Gallops


Lungs:  Other (diminshed in bases, Rhonci in LLL)


Abdomen:  Soft, Other (drains in place)


Extremities:  No clubbing, No cyanosis, No edema, Normal pulses, No 

tenderness/swelling


Skin:  Other (warm, dry)





Labs


LABS





Laboratory Tests








Test


 6/15/20


11:47 6/15/20


17:24 6/16/20


01:26 6/16/20


06:20


 


Glucose (Fingerstick)


 121 mg/dL


(70-99) 105 mg/dL


(70-99) 113 mg/dL


(70-99) 122 mg/dL


(70-99)


 


White Blood Count


 


 


 


 8.4 x10^3/uL


(4.0-11.0)


 


Red Blood Count


 


 


 


 3.38 x10^6/uL


(3.50-5.40)


 


Hemoglobin


 


 


 


 9.7 g/dL


(12.0-15.5)


 


Hematocrit


 


 


 


 28.9 %


(36.0-47.0)


 


Mean Corpuscular Volume    86 fL () 


 


Mean Corpuscular Hemoglobin    29 pg (25-35) 


 


Mean Corpuscular Hemoglobin


Concent 


 


 


 34 g/dL


(31-37)


 


Red Cell Distribution Width


 


 


 


 18.6 %


(11.5-14.5)


 


Platelet Count


 


 


 


 432 x10^3/uL


(140-400)


 


Neutrophils (%) (Auto)    79 % (31-73) 


 


Lymphocytes (%) (Auto)    16 % (24-48) 


 


Monocytes (%) (Auto)    4 % (0-9) 


 


Eosinophils (%) (Auto)    1 % (0-3) 


 


Basophils (%) (Auto)    0 % (0-3) 


 


Neutrophils # (Auto)


 


 


 


 6.6 x10^3/uL


(1.8-7.7)


 


Lymphocytes # (Auto)


 


 


 


 1.3 x10^3/uL


(1.0-4.8)


 


Monocytes # (Auto)


 


 


 


 0.3 x10^3/uL


(0.0-1.1)


 


Eosinophils # (Auto)


 


 


 


 0.1 x10^3/uL


(0.0-0.7)


 


Basophils # (Auto)


 


 


 


 0.0 x10^3/uL


(0.0-0.2)


 


Sodium Level


 


 


 


 141 mmol/L


(136-145)


 


Potassium Level


 


 


 


 3.6 mmol/L


(3.5-5.1)


 


Chloride Level


 


 


 


 108 mmol/L


()


 


Carbon Dioxide Level


 


 


 


 23 mmol/L


(21-32)


 


Anion Gap    10 (6-14) 


 


Blood Urea Nitrogen


 


 


 


 28 mg/dL


(7-20)


 


Creatinine


 


 


 


 0.7 mg/dL


(0.6-1.0)


 


Estimated GFR


(Cockcroft-Gault) 


 


 


 88.9 





 


Glucose Level


 


 


 


 129 mg/dL


(70-99)


 


Calcium Level


 


 


 


 8.9 mg/dL


(8.5-10.1)


 


Magnesium Level


 


 


 


 1.5 mg/dL


(1.8-2.4)


 


Triglycerides Level


 


 


 


 251 mg/dL


(0-150)


 


Test


 6/16/20


08:00 


 


 





 


O2 Saturation 98 % (92-99)    


 


Arterial Blood pH


 7.39


(7.35-7.45) 


 


 





 


Arterial Blood pCO2 at


Patient Temp 34 mmHg


(35-46) 


 


 





 


Arterial Blood pO2 at Patient


Temp 121 mmHg


() 


 


 





 


Arterial Blood HCO3


 20 mmol/L


(21-28) 


 


 





 


Arterial Blood Base Excess


 -4 mmol/L


(-3-3) 


 


 





 


FiO2 35%    











Assessment and Plan


Assessmemt and Plan


Problems


Medical Problems:


(1) Acute pancreatitis


Status: Acute  





(2) Cholelithiasis


Status: Acute  











Comment


Review of Relevant


I have reviewed the following items josy (where applicable) has been applied.


Labs





Laboratory Tests








Test


 6/14/20


10:45 6/14/20


12:11 6/14/20


17:52 6/15/20


00:23


 


O2 Saturation 98 % (92-99)    


 


Arterial Blood pH


 7.45


(7.35-7.45) 


 


 





 


Arterial Blood pCO2 at


Patient Temp 34 mmHg


(35-46) 


 


 





 


Arterial Blood pO2 at Patient


Temp 146 mmHg


() 


 


 





 


Arterial Blood HCO3


 23 mmol/L


(21-28) 


 


 





 


Arterial Blood Base Excess


 -1 mmol/L


(-3-3) 


 


 





 


FiO2 40    


 


Glucose (Fingerstick)


 


 159 mg/dL


(70-99) 129 mg/dL


(70-99) 118 mg/dL


(70-99)


 


Test


 6/15/20


05:45 6/15/20


05:54 6/15/20


08:10 6/15/20


11:47


 


White Blood Count


 8.1 x10^3/uL


(4.0-11.0) 


 


 





 


Red Blood Count


 3.28 x10^6/uL


(3.50-5.40) 


 


 





 


Hemoglobin


 9.3 g/dL


(12.0-15.5) 


 


 





 


Hematocrit


 27.8 %


(36.0-47.0) 


 


 





 


Mean Corpuscular Volume 85 fL ()    


 


Mean Corpuscular Hemoglobin 28 pg (25-35)    


 


Mean Corpuscular Hemoglobin


Concent 34 g/dL


(31-37) 


 


 





 


Red Cell Distribution Width


 18.6 %


(11.5-14.5) 


 


 





 


Platelet Count


 434 x10^3/uL


(140-400) 


 


 





 


Neutrophils (%) (Auto) 80 % (31-73)    


 


Lymphocytes (%) (Auto) 15 % (24-48)    


 


Monocytes (%) (Auto) 5 % (0-9)    


 


Eosinophils (%) (Auto) 1 % (0-3)    


 


Basophils (%) (Auto) 0 % (0-3)    


 


Neutrophils # (Auto)


 6.4 x10^3/uL


(1.8-7.7) 


 


 





 


Lymphocytes # (Auto)


 1.2 x10^3/uL


(1.0-4.8) 


 


 





 


Monocytes # (Auto)


 0.4 x10^3/uL


(0.0-1.1) 


 


 





 


Eosinophils # (Auto)


 0.1 x10^3/uL


(0.0-0.7) 


 


 





 


Basophils # (Auto)


 0.0 x10^3/uL


(0.0-0.2) 


 


 





 


Sodium Level


 143 mmol/L


(136-145) 


 


 





 


Potassium Level


 3.3 mmol/L


(3.5-5.1) 


 


 





 


Chloride Level


 111 mmol/L


() 


 


 





 


Carbon Dioxide Level


 23 mmol/L


(21-32) 


 


 





 


Anion Gap 9 (6-14)    


 


Blood Urea Nitrogen


 34 mg/dL


(7-20) 


 


 





 


Creatinine


 0.7 mg/dL


(0.6-1.0) 


 


 





 


Estimated GFR


(Cockcroft-Gault) 88.9 


 


 


 





 


BUN/Creatinine Ratio 49 (6-20)    


 


Glucose Level


 125 mg/dL


(70-99) 


 


 





 


Calcium Level


 8.8 mg/dL


(8.5-10.1) 


 


 





 


Magnesium Level


 1.8 mg/dL


(1.8-2.4) 


 


 





 


Total Bilirubin


 0.5 mg/dL


(0.2-1.0) 


 


 





 


Aspartate Amino Transf


(AST/SGOT) 62 U/L (15-37) 


 


 


 





 


Alanine Aminotransferase


(ALT/SGPT) 72 U/L (14-59) 


 


 


 





 


Alkaline Phosphatase


 132 U/L


() 


 


 





 


Total Protein


 4.5 g/dL


(6.4-8.2) 


 


 





 


Albumin


 1.1 g/dL


(3.4-5.0) 


 


 





 


Albumin/Globulin Ratio 0.3 (1.0-1.7)    


 


Glucose (Fingerstick)


 


 115 mg/dL


(70-99) 


 121 mg/dL


(70-99)


 


O2 Saturation   97 % (92-99)  


 


Arterial Blood pH


 


 


 7.46


(7.35-7.45) 





 


Arterial Blood pCO2 at


Patient Temp 


 


 31 mmHg


(35-46) 





 


Arterial Blood pO2 at Patient


Temp 


 


 117 mmHg


() 





 


Arterial Blood HCO3


 


 


 22 mmol/L


(21-28) 





 


Arterial Blood Base Excess


 


 


 -2 mmol/L


(-3-3) 





 


FiO2   35  


 


Test


 6/15/20


17:24 6/16/20


01:26 6/16/20


06:20 6/16/20


08:00


 


Glucose (Fingerstick)


 105 mg/dL


(70-99) 113 mg/dL


(70-99) 122 mg/dL


(70-99) 





 


White Blood Count


 


 


 8.4 x10^3/uL


(4.0-11.0) 





 


Red Blood Count


 


 


 3.38 x10^6/uL


(3.50-5.40) 





 


Hemoglobin


 


 


 9.7 g/dL


(12.0-15.5) 





 


Hematocrit


 


 


 28.9 %


(36.0-47.0) 





 


Mean Corpuscular Volume   86 fL ()  


 


Mean Corpuscular Hemoglobin   29 pg (25-35)  


 


Mean Corpuscular Hemoglobin


Concent 


 


 34 g/dL


(31-37) 





 


Red Cell Distribution Width


 


 


 18.6 %


(11.5-14.5) 





 


Platelet Count


 


 


 432 x10^3/uL


(140-400) 





 


Neutrophils (%) (Auto)   79 % (31-73)  


 


Lymphocytes (%) (Auto)   16 % (24-48)  


 


Monocytes (%) (Auto)   4 % (0-9)  


 


Eosinophils (%) (Auto)   1 % (0-3)  


 


Basophils (%) (Auto)   0 % (0-3)  


 


Neutrophils # (Auto)


 


 


 6.6 x10^3/uL


(1.8-7.7) 





 


Lymphocytes # (Auto)


 


 


 1.3 x10^3/uL


(1.0-4.8) 





 


Monocytes # (Auto)


 


 


 0.3 x10^3/uL


(0.0-1.1) 





 


Eosinophils # (Auto)


 


 


 0.1 x10^3/uL


(0.0-0.7) 





 


Basophils # (Auto)


 


 


 0.0 x10^3/uL


(0.0-0.2) 





 


Sodium Level


 


 


 141 mmol/L


(136-145) 





 


Potassium Level


 


 


 3.6 mmol/L


(3.5-5.1) 





 


Chloride Level


 


 


 108 mmol/L


() 





 


Carbon Dioxide Level


 


 


 23 mmol/L


(21-32) 





 


Anion Gap   10 (6-14)  


 


Blood Urea Nitrogen


 


 


 28 mg/dL


(7-20) 





 


Creatinine


 


 


 0.7 mg/dL


(0.6-1.0) 





 


Estimated GFR


(Cockcroft-Gault) 


 


 88.9 


 





 


Glucose Level


 


 


 129 mg/dL


(70-99) 





 


Calcium Level


 


 


 8.9 mg/dL


(8.5-10.1) 





 


Magnesium Level


 


 


 1.5 mg/dL


(1.8-2.4) 





 


Triglycerides Level


 


 


 251 mg/dL


(0-150) 





 


O2 Saturation    98 % (92-99) 


 


Arterial Blood pH


 


 


 


 7.39


(7.35-7.45)


 


Arterial Blood pCO2 at


Patient Temp 


 


 


 34 mmHg


(35-46)


 


Arterial Blood pO2 at Patient


Temp 


 


 


 121 mmHg


()


 


Arterial Blood HCO3


 


 


 


 20 mmol/L


(21-28)


 


Arterial Blood Base Excess


 


 


 


 -4 mmol/L


(-3-3)


 


FiO2    35% 








Laboratory Tests








Test


 6/15/20


11:47 6/15/20


17:24 6/16/20


01:26 6/16/20


06:20


 


Glucose (Fingerstick)


 121 mg/dL


(70-99) 105 mg/dL


(70-99) 113 mg/dL


(70-99) 122 mg/dL


(70-99)


 


White Blood Count


 


 


 


 8.4 x10^3/uL


(4.0-11.0)


 


Red Blood Count


 


 


 


 3.38 x10^6/uL


(3.50-5.40)


 


Hemoglobin


 


 


 


 9.7 g/dL


(12.0-15.5)


 


Hematocrit


 


 


 


 28.9 %


(36.0-47.0)


 


Mean Corpuscular Volume    86 fL () 


 


Mean Corpuscular Hemoglobin    29 pg (25-35) 


 


Mean Corpuscular Hemoglobin


Concent 


 


 


 34 g/dL


(31-37)


 


Red Cell Distribution Width


 


 


 


 18.6 %


(11.5-14.5)


 


Platelet Count


 


 


 


 432 x10^3/uL


(140-400)


 


Neutrophils (%) (Auto)    79 % (31-73) 


 


Lymphocytes (%) (Auto)    16 % (24-48) 


 


Monocytes (%) (Auto)    4 % (0-9) 


 


Eosinophils (%) (Auto)    1 % (0-3) 


 


Basophils (%) (Auto)    0 % (0-3) 


 


Neutrophils # (Auto)


 


 


 


 6.6 x10^3/uL


(1.8-7.7)


 


Lymphocytes # (Auto)


 


 


 


 1.3 x10^3/uL


(1.0-4.8)


 


Monocytes # (Auto)


 


 


 


 0.3 x10^3/uL


(0.0-1.1)


 


Eosinophils # (Auto)


 


 


 


 0.1 x10^3/uL


(0.0-0.7)


 


Basophils # (Auto)


 


 


 


 0.0 x10^3/uL


(0.0-0.2)


 


Sodium Level


 


 


 


 141 mmol/L


(136-145)


 


Potassium Level


 


 


 


 3.6 mmol/L


(3.5-5.1)


 


Chloride Level


 


 


 


 108 mmol/L


()


 


Carbon Dioxide Level


 


 


 


 23 mmol/L


(21-32)


 


Anion Gap    10 (6-14) 


 


Blood Urea Nitrogen


 


 


 


 28 mg/dL


(7-20)


 


Creatinine


 


 


 


 0.7 mg/dL


(0.6-1.0)


 


Estimated GFR


(Cockcroft-Gault) 


 


 


 88.9 





 


Glucose Level


 


 


 


 129 mg/dL


(70-99)


 


Calcium Level


 


 


 


 8.9 mg/dL


(8.5-10.1)


 


Magnesium Level


 


 


 


 1.5 mg/dL


(1.8-2.4)


 


Triglycerides Level


 


 


 


 251 mg/dL


(0-150)


 


Test


 6/16/20


08:00 


 


 





 


O2 Saturation 98 % (92-99)    


 


Arterial Blood pH


 7.39


(7.35-7.45) 


 


 





 


Arterial Blood pCO2 at


Patient Temp 34 mmHg


(35-46) 


 


 





 


Arterial Blood pO2 at Patient


Temp 121 mmHg


() 


 


 





 


Arterial Blood HCO3


 20 mmol/L


(21-28) 


 


 





 


Arterial Blood Base Excess


 -4 mmol/L


(-3-3) 


 


 





 


FiO2 35%    








Microbiology


6/13/20 Gram Stain Evaluation - Final, Resulted


          


6/13/20 Respiratory Culture - Preliminary, Resulted


          


6/7/20 Gram Stain - Final, Complete


         


6/7/20 Aerobic and Anaerobic Culture - Final, Complete


         


6/7/20 Antimicrobic Susceptibility - Final, Complete


         


6/7/20 Blood Culture - Final, Complete


         NO GROWTH AFTER 5 DAYS


6/7/20 Urine Culture - Final, Complete


         


5/30/20 Gram Stain - Final, Complete


          


5/30/20 Aerobic Culture - Final, Complete


Medications





Current Medications


Sodium Chloride 1,000 ml @  1,000 mls/hr Q1H IV  Last administered on 3/16/20at 

03:00;  Start 3/16/20 at 03:00;  Stop 3/16/20 at 03:59;  Status DC


Ondansetron HCl (Zofran) 4 mg 1X  ONCE IVP  Last administered on 3/16/20at 

03:27;  Start 3/16/20 at 03:00;  Stop 3/16/20 at 03:01;  Status DC


Morphine Sulfate (Morphine Sulfate) 4 mg 1X  ONCE IV ;  Start 3/16/20 at 03:00; 

Stop 3/16/20 at 03:01;  Status Cancel


Ketorolac Tromethamine (Toradol 30mg Vial) 30 mg 1X  ONCE IV  Last administered 

on 3/16/20at 02:54;  Start 3/16/20 at 03:00;  Stop 3/16/20 at 03:01;  Status DC


Fentanyl Citrate (Fentanyl 2ml Vial) 25 mcg 1X  ONCE IVP  Last administered on 

3/16/20at 03:23;  Start 3/16/20 at 03:30;  Stop 3/16/20 at 03:31;  Status DC


Fentanyl Citrate (Fentanyl 2ml Vial) 100 mcg STK-MED ONCE .ROUTE ;  Start 

3/16/20 at 03:18;  Stop 3/16/20 at 03:18;  Status DC


Iohexol (Omnipaque 350 Mg/ml) 90 ml 1X  ONCE IV  Last administered on 3/16/20at 

03:25;  Start 3/16/20 at 03:30;  Stop 3/16/20 at 03:31;  Status DC


Info (CONTRAST GIVEN -- Rx MONITORING) 1 each PRN DAILY  PRN MC SEE COMMENTS;  

Start 3/16/20 at 03:30;  Stop 3/18/20 at 03:29;  Status DC


Hydromorphone HCl (Dilaudid) 0.5 mg 1X  ONCE IV  Last administered on 3/16/20at 

03:55;  Start 3/16/20 at 04:30;  Stop 3/16/20 at 04:32;  Status DC


Ondansetron HCl (Zofran) 4 mg PRN Q8HRS  PRN IV NAUSEA/VOMITING 1ST CHOICE;  

Start 3/16/20 at 05:00;  Stop 3/16/20 at 09:27;  Status DC


Morphine Sulfate (Morphine Sulfate) 2 mg PRN Q2HR  PRN IV SEVERE PAIN 7-10 Last 

administered on 3/17/20at 12:26;  Start 3/16/20 at 05:00;  Stop 3/17/20 at 

14:15;  Status DC


Sodium Chloride 1,000 ml @  125 mls/hr Q8H IV  Last administered on 3/16/20at 

20:56;  Start 3/16/20 at 05:00;  Stop 3/17/20 at 04:59;  Status DC


Hydromorphone HCl (Dilaudid) 0.5 mg PRN Q3HRS  PRN IV SEVERE PAIN 7-10 Last 

administered on 3/17/20at 10:06;  Start 3/16/20 at 05:00;  Stop 3/17/20 at 

12:01;  Status DC


Piperacillin Sod/ Tazobactam Sod 4.5 gm/Sodium Chloride 100 ml @  200 mls/hr 1X 

ONCE IV  Last administered on 3/16/20at 05:44;  Start 3/16/20 at 06:00;  Stop 

3/16/20 at 06:29;  Status DC


Ondansetron HCl (Zofran) 4 mg PRN Q4HRS  PRN IV NAUSEA/VOMITING 1ST CHOICE Last 

administered on 6/16/20at 08:21;  Start 3/16/20 at 09:30


Insulin Human Lispro (HumaLOG) 0-9 UNITS Q6HRS SQ  Last administered on 

6/14/20at 12:21;  Start 3/16/20 at 09:30


Dextrose (Dextrose 50%-Water Syringe) 12.5 gm PRN Q15MIN  PRN IV SEE COMMENTS;  

Start 3/16/20 at 09:30


Pantoprazole Sodium (PROTONIX VIAL for IV PUSH) 40 mg DAILYAC IVP  Last 

administered on 6/16/20at 08:18;  Start 3/16/20 at 11:30


Prochlorperazine Edisylate (Compazine) 10 mg PRN Q6HRS  PRN IV NAUSEA/VOMITING, 

2nd CHOICE Last administered on 6/10/20at 14:33;  Start 3/16/20 at 17:45


Atenolol (Tenormin) 100 mg DAILY PO ;  Start 3/17/20 at 09:00;  Stop 3/16/20 at 

20:08;  Status DC


Metoprolol Tartrate (Lopressor Vial) 2.5 mg Q6HRS IVP  Last administered on 

3/17/20at 05:51;  Start 3/16/20 at 20:15;  Stop 3/17/20 at 10:02;  Status DC


Metoprolol Tartrate (Lopressor Vial) 5 mg Q6HRS IVP  Last administered on 

3/26/20at 00:12;  Start 3/17/20 at 10:15;  Stop 3/28/20 at 08:48;  Status DC


Hydromorphone HCl (Dilaudid) 1 mg PRN Q3HRS  PRN IV SEVERE PAIN 7-10 Last 

administered on 3/23/20at 05:13;  Start 3/17/20 at 12:00;  Stop 3/31/20 at 

00:25;  Status DC


Lidocaine HCl (Buffered Lidocaine 1%) 3 ml STK-MED ONCE .ROUTE ;  Start 3/17/20 

at 12:55;  Stop 3/17/20 at 12:56;  Status DC


Albumin Human 500 ml @  125 mls/hr 1X  ONCE IV  Last administered on 3/17/20at 

14:33;  Start 3/17/20 at 14:30;  Stop 3/17/20 at 18:32;  Status DC


Norepinephrine Bitartrate 8 mg/ Dextrose 258 ml @  17.299 mls/ hr CONT  PRN IV 

PER PROTOCOL Last administered on 4/14/20at 12:48;  Start 3/17/20 at 15:30;  

Stop 4/17/20 at 09:19;  Status DC


Sodium Chloride 1,000 ml @  125 mls/hr Q8H IV  Last administered on 3/17/20at 

21:04;  Start 3/17/20 at 16:00;  Stop 3/18/20 at 02:42;  Status DC


Albumin Human 500 ml @  125 mls/hr PRN BID  PRN IV After every 2L NSS & BP < 

90mm Last administered on 6/6/20at 11:40;  Start 3/17/20 at 16:00


Iohexol (Omnipaque 300 Mg/ml) 60 ml 1X  ONCE IV  Last administered on 3/17/20at 

17:20;  Start 3/17/20 at 17:00;  Stop 3/17/20 at 17:01;  Status DC


Info (CONTRAST GIVEN -- Rx MONITORING) 1 each PRN DAILY  PRN MC SEE COMMENTS;  

Start 3/17/20 at 17:00;  Stop 3/19/20 at 16:59;  Status DC


Meropenem 1 gm/ Sodium Chloride 100 ml @  200 mls/hr Q8HRS IV  Last administered

on 3/18/20at 05:45;  Start 3/17/20 at 20:00;  Stop 3/18/20 at 08:48;  Status DC


Furosemide (Lasix) 40 mg 1X  ONCE IVP  Last administered on 3/17/20at 22:12;  

Start 3/17/20 at 22:30;  Stop 3/17/20 at 22:31;  Status DC


Calcium Chloride 1000 mg/Sodium Chloride 110 ml @  220 mls/hr 1X  ONCE IV  Last 

administered on 3/17/20at 22:11;  Start 3/17/20 at 22:30;  Stop 3/17/20 at 

22:59;  Status DC


Albuterol Sulfate (Ventolin Neb Soln) 2.5 mg 1X  ONCE NEB  Last administered on 

3/18/20at 00:56;  Start 3/17/20 at 22:30;  Stop 3/17/20 at 22:31;  Status DC


Insulin Human Regular (HumuLIN R VIAL) 5 unit 1X  ONCE IV  Last administered on 

3/17/20at 22:14;  Start 3/17/20 at 22:30;  Stop 3/17/20 at 22:31;  Status DC


Magnesium Sulfate 50 ml @ 25 mls/hr 1X  ONCE IV  Last administered on 3/18/20at 

02:57;  Start 3/18/20 at 03:00;  Stop 3/18/20 at 04:59;  Status DC


Calcium Gluconate 1000 mg/Sodium Chloride 110 ml @  220 mls/hr 1X  ONCE IV  Last

administered on 3/18/20at 02:46;  Start 3/18/20 at 03:00;  Stop 3/18/20 at 

03:29;  Status DC


Sodium Chloride 1,000 ml @  200 mls/hr Q5H IV  Last administered on 3/18/20at 

02:46;  Start 3/18/20 at 03:00;  Stop 3/18/20 at 10:21;  Status DC


Calcium Gluconate 1000 mg/Sodium Chloride 110 ml @  220 mls/hr 1X  ONCE IV  Last

administered on 3/18/20at 03:21;  Start 3/18/20 at 03:30;  Stop 3/18/20 at 

03:59;  Status DC


Sodium Bicarbonate 50 meq/Sodium Chloride 1,050 ml @  75 mls/hr Q14H IV  Last 

administered on 3/22/20at 21:10;  Start 3/18/20 at 07:30;  Stop 3/23/20 at 

10:28;  Status DC


Calcium Gluconate 2000 mg/Sodium Chloride 120 ml @  220 mls/hr 1X  ONCE IV  Last

administered on 3/18/20at 09:05;  Start 3/18/20 at 07:30;  Stop 3/18/20 at 

08:02;  Status DC


Lidocaine HCl (Xylocaine-Mpf 1% 2ml Vial) 2 ml STK-MED ONCE .ROUTE ;  Start 

3/18/20 at 08:47;  Stop 3/18/20 at 08:47;  Status DC


Meropenem 500 mg/ Sodium Chloride 50 ml @  100 mls/hr Q12HR IV  Last ad

ministered on 3/23/20at 21:01;  Start 3/18/20 at 18:00;  Stop 3/24/20 at 07:58; 

Status DC


Lidocaine HCl (Buffered Lidocaine 1%) 3 ml STK-MED ONCE .ROUTE ;  Start 3/18/20 

at 09:46;  Stop 3/18/20 at 09:46;  Status DC


Lidocaine HCl (Buffered Lidocaine 1%) 6 ml 1X  ONCE INJ  Last administered on 

3/18/20at 10:26;  Start 3/18/20 at 10:15;  Stop 3/18/20 at 10:16;  Status DC


Info (Tpn Per Pharmacy) 1 each PRN DAILY  PRN MC SEE COMMENTS Last administered 

on 6/15/20at 09:51;  Start 3/18/20 at 12:00


Sodium Chloride 1,000 ml @  1,000 mls/hr Q1H PRN IV hypotension;  Start 3/18/20 

at 12:07;  Stop 3/18/20 at 18:06;  Status DC


Diphenhydramine HCl (Benadryl) 25 mg 1X PRN  PRN IV ITCHING;  Start 3/18/20 at 

12:15;  Stop 3/19/20 at 12:14;  Status DC


Diphenhydramine HCl (Benadryl) 25 mg 1X PRN  PRN IV ITCHING;  Start 3/18/20 at 

12:15;  Stop 3/19/20 at 12:14;  Status DC


Sodium Chloride 1,000 ml @  400 mls/hr Q2H30M PRN IV PATENCY;  Start 3/18/20 at 

12:07;  Stop 3/19/20 at 00:06;  Status DC


Info (PHARMACY MONITORING -- do not chart) 1 each PRN DAILY  PRN MC SEE 

COMMENTS;  Start 3/18/20 at 12:15;  Stop 3/20/20 at 08:13;  Status DC


Sodium Chloride 90 meq/Calcium Gluconate 10 meq/ Multivitamins 10 ml/Chromium/ 

Copper/Manganese/ Seleni/Zn 1 ml/ Total Parenteral Nutrition/Amino Acids/De

xtrose/ Fat Emulsion Intravenous 55.005 ml  @ 2.292 mls/hr TPN  CONT IV ;  Start

3/18/20 at 22:00;  Stop 3/18/20 at 12:33;  Status DC


Info (Tpn Per Pharmacy) 1 each PRN DAILY  PRN MC SEE COMMENTS;  Start 3/18/20 at

12:30;  Status UNV


Sodium Chloride 90 meq/Calcium Gluconate 10 meq/ Multivitamins 10 ml/Chromium/ 

Copper/Manganese/ Seleni/Zn 0.5 ml/ Total Parenteral Nutrition/Amino Ac

ids/Dextrose/ Fat Emulsion Intravenous 1,512 ml @  63 mls/hr TPN  CONT IV  Last 

administered on 3/18/20at 22:06;  Start 3/18/20 at 22:00;  Stop 3/19/20 at 

21:59;  Status DC


Calcium Carbonate/ Glycine (Tums) 500 mg PRN AFTMEALHC  PRN PO INDIGESTION;  

Start 3/18/20 at 17:45;  Stop 5/13/20 at 10:25;  Status DC


Calcium Gluconate (Calcium Gluconate) 2,000 mg 1X  ONCE IVP  Last administered 

on 3/19/20at 02:19;  Start 3/19/20 at 02:15;  Stop 3/19/20 at 02:16;  Status DC


Calcium Chloride 3000 mg/Sodium Chloride 1,030 ml @  50 mls/hr [id number] IV  Last 

administered on 3/21/20at 02:17;  Start 3/19/20 at 08:00;  Stop 3/21/20 at 

15:23;  Status DC


Lorazepam (Ativan Inj) 1 mg PRN Q4HRS  PRN IVP ANXIETY / AGITATION, 2nd choic 

Last administered on 4/17/20at 03:51;  Start 3/19/20 at 09:00;  Stop 4/17/20 at 

09:19;  Status DC


Sodium Chloride 1,000 ml @  1,000 mls/hr Q1H PRN IV hypotension;  Start 3/19/20 

at 08:56;  Stop 3/19/20 at 14:55;  Status DC


Albumin Human 200 ml @  200 mls/hr 1X PRN  PRN IV Hypotension;  Start 3/19/20 at

09:00;  Stop 3/19/20 at 14:59;  Status DC


Diphenhydramine HCl (Benadryl) 25 mg 1X PRN  PRN IV ITCHING;  Start 3/19/20 at 

09:00;  Stop 3/20/20 at 08:59;  Status DC


Diphenhydramine HCl (Benadryl) 25 mg 1X PRN  PRN IV ITCHING;  Start 3/19/20 at 

09:00;  Stop 3/20/20 at 08:59;  Status DC


Sodium Chloride 1,000 ml @  400 mls/hr Q2H30M PRN IV PATENCY;  Start 3/19/20 at 

08:56;  Stop 3/19/20 at 20:55;  Status DC


Info (PHARMACY MONITORING -- do not chart) 1 each PRN DAILY  PRN MC SEE 

COMMENTS;  Start 3/19/20 at 09:00;  Status UNV


Info (PHARMACY MONITORING -- do not chart) 1 each PRN DAILY  PRN MC SEE 

COMMENTS;  Start 3/19/20 at 09:00;  Stop 3/20/20 at 08:13;  Status DC


Digoxin (Lanoxin) 500 mcg 1X  ONCE IV  Last administered on 3/19/20at 10:04;  

Start 3/19/20 at 10:00;  Stop 3/19/20 at 10:01;  Status DC


Digoxin (Lanoxin) 125 mcg 1X  ONCE IV  Last administered on 3/19/20at 17:10;  

Start 3/19/20 at 18:00;  Stop 3/19/20 at 18:01;  Status DC


Magnesium Sulfate 100 ml @  25 mls/hr 1X  ONCE IV  Last administered on 

3/19/20at 12:48;  Start 3/19/20 at 13:00;  Stop 3/19/20 at 16:59;  Status DC


Sodium Chloride 90 meq/Magnesium Sulfate 10 meq/ Calcium Gluconate 20 meq/ 

Multivitamins 10 ml/Chromium/ Copper/Manganese/ Seleni/Zn 0.5 ml/ Total 

Parenteral Nutrition/Amino Acids/Dextrose/ Fat Emulsion Intravenous 1,512 ml @  

63 mls/hr TPN  CONT IV  Last administered on 3/19/20at 22:25;  Start 3/19/20 at 

22:00;  Stop 3/20/20 at 21:59;  Status DC


Sodium Chloride 1,000 ml @  1,000 mls/hr Q1H PRN IV hypotension;  Start 3/20/20 

at 08:05;  Stop 3/20/20 at 14:04;  Status DC


Albumin Human 200 ml @  200 mls/hr 1X  ONCE IV  Last administered on 3/20/20at 

08:57;  Start 3/20/20 at 08:15;  Stop 3/20/20 at 09:14;  Status DC


Diphenhydramine HCl (Benadryl) 25 mg 1X PRN  PRN IV ITCHING;  Start 3/20/20 at 

08:15;  Stop 3/21/20 at 08:14;  Status DC


Diphenhydramine HCl (Benadryl) 25 mg 1X PRN  PRN IV ITCHING;  Start 3/20/20 at 

08:15;  Stop 3/21/20 at 08:14;  Status DC


Sodium Chloride 1,000 ml @  400 mls/hr Q2H30M PRN IV PATENCY;  Start 3/20/20 at 

08:05;  Stop 3/20/20 at 20:04;  Status DC


Info (PHARMACY MONITORING -- do not chart) 1 each PRN DAILY  PRN MC SEE 

COMMENTS;  Start 3/20/20 at 08:15;  Stop 3/24/20 at 07:57;  Status DC


Sodium Chloride 90 meq/Potassium Chloride 15 meq/ Potassium Phosphate 10 mmol/ 

Magnesium Sulfate 10 meq/Calcium Gluconate 20 meq/ Multivitamins 10 ml/Chromium/

Copper/Manganese/ Seleni/Zn 0.5 ml/ Total Parenteral Nutrition/Amino 

Acids/Dextrose/ Fat Emulsion Intravenous 1,512 ml @  63 mls/hr TPN  CONT IV  

Last administered on 3/20/20at 21:01;  Start 3/20/20 at 22:00;  Stop 3/21/20 at 

21:59;  Status DC


Potassium Chloride/Water 100 ml @  100 mls/hr 1X  ONCE IV  Last administered on 

3/20/20at 14:09;  Start 3/20/20 at 14:00;  Stop 3/20/20 at 14:59;  Status DC


Benzocaine (Hurricaine One) 1 spray 1X  ONCE MM  Last administered on 3/20/20at 

16:38;  Start 3/20/20 at 14:30;  Stop 3/20/20 at 14:31;  Status DC


Lidocaine HCl (Glydo (Lidocaine) Jelly) 1 thomas 1X  ONCE MM  Last administered on 

3/20/20at 16:38;  Start 3/20/20 at 14:30;  Stop 3/20/20 at 14:31;  Status DC


Linezolid/Dextrose 300 ml @  300 mls/hr Q12HR IV  Last administered on 3/26/20at

21:04;  Start 3/20/20 at 20:00;  Stop 3/27/20 at 07:50;  Status DC


Acetaminophen (Tylenol) 650 mg PRN Q6HRS  PRN PO MILD PAIN / TEMP;  Start 

3/21/20 at 03:30;  Stop 3/21/20 at 03:36;  Status DC


Acetaminophen (Tylenol) 650 mg PRN Q6HRS  PRN PEG MILD PAIN / TEMP Last 

administered on 4/16/20at 19:56;  Start 3/21/20 at 03:36;  Stop 5/13/20 at 

10:25;  Status DC


Sodium Chloride 1,000 ml @  1,000 mls/hr Q1H PRN IV hypotension;  Start 3/21/20 

at 07:50;  Stop 3/21/20 at 13:49;  Status DC


Albumin Human 200 ml @  200 mls/hr 1X PRN  PRN IV Hypotension;  Start 3/21/20 at

08:00;  Stop 3/21/20 at 13:59;  Status DC


Sodium Chloride (Normal Saline Flush) 10 ml 1X PRN  PRN IV AP catheter pack;  

Start 3/21/20 at 08:00;  Stop 3/22/20 at 07:59;  Status DC


Sodium Chloride (Normal Saline Flush) 10 ml 1X PRN  PRN IV  catheter pack;  

Start 3/21/20 at 08:00;  Stop 3/22/20 at 07:59;  Status DC


Sodium Chloride 1,000 ml @  400 mls/hr Q2H30M PRN IV PATENCY;  Start 3/21/20 at 

07:50;  Stop 3/21/20 at 19:49;  Status DC


Info (PHARMACY MONITORING -- do not chart) 1 each PRN DAILY  PRN MC SEE 

COMMENTS;  Start 3/21/20 at 08:00;  Status UNV


Info (PHARMACY MONITORING -- do not chart) 1 each PRN DAILY  PRN MC SEE 

COMMENTS;  Start 3/21/20 at 08:00;  Stop 3/23/20 at 08:25;  Status DC


Sodium Chloride 90 meq/Potassium Chloride 15 meq/ Potassium Phosphate 10 mmol/ 

Magnesium Sulfate 10 meq/Calcium Gluconate 20 meq/ Multivitamins 10 ml/Chromium/

Copper/Manganese/ Seleni/Zn 0.5 ml/ Total Parenteral Nutrition/Amino 

Acids/Dextrose/ Fat Emulsion Intravenous 1,512 ml @  63 mls/hr TPN  CONT IV  

Last administered on 3/21/20at 20:57;  Start 3/21/20 at 22:00;  Stop 3/22/20 at 

21:59;  Status DC


Sodium Chloride 90 meq/Potassium Chloride 15 meq/ Potassium Phosphate 15 mmol/ 

Magnesium Sulfate 10 meq/Calcium Gluconate 20 meq/ Multivitamins 10 ml/Chromium/

Copper/Manganese/ Seleni/Zn 0.5 ml/ Total Parenteral Nutrition/Amino 

Acids/Dextrose/ Fat Emulsion Intravenous 1,512 ml @  63 mls/hr TPN  CONT IV ;  

Start 3/22/20 at 22:00;  Stop 3/22/20 at 14:16;  Status DC


Sodium Chloride 90 meq/Potassium Chloride 15 meq/ Potassium Phosphate 15 mmol/ 

Magnesium Sulfate 10 meq/Calcium Gluconate 20 meq/ Multivitamins 10 ml/Chromium/

Copper/Manganese/ Seleni/Zn 0.5 ml/ Total Parenteral Nutrition/Amino 

Acids/Dextrose/ Fat Emulsion Intravenous 1,200 ml @  50 mls/hr TPN  CONT IV ;  

Start 3/22/20 at 22:00;  Stop 3/22/20 at 14:17;  Status DC


Sodium Chloride 90 meq/Potassium Chloride 15 meq/ Potassium Phosphate 10 mmol/ 

Magnesium Sulfate 10 meq/Calcium Gluconate 20 meq/ Multivitamins 10 ml/Chromium/

Copper/Manganese/ Seleni/Zn 0.5 ml/ Total Parenteral Nutrition/Amino 

Acids/Dextrose/ Fat Emulsion Intravenous 1,200 ml @  50 mls/hr TPN  CONT IV  

Last administered on 3/22/20at 23:29;  Start 3/22/20 at 22:00;  Stop 3/23/20 at 

21:59;  Status DC


Sodium Chloride 1,000 ml @  1,000 mls/hr Q1H PRN IV hypotension;  Start 3/23/20 

at 07:28;  Stop 3/23/20 at 13:27;  Status DC


Albumin Human 200 ml @  200 mls/hr 1X  ONCE IV  Last administered on 3/23/20at 

08:51;  Start 3/23/20 at 07:30;  Stop 3/23/20 at 08:29;  Status DC


Diphenhydramine HCl (Benadryl) 25 mg 1X PRN  PRN IV ITCHING;  Start 3/23/20 at 

07:30;  Stop 3/24/20 at 07:29;  Status DC


Diphenhydramine HCl (Benadryl) 25 mg 1X PRN  PRN IV ITCHING;  Start 3/23/20 at 

07:30;  Stop 3/24/20 at 07:29;  Status DC


Sodium Chloride 1,000 ml @  400 mls/hr Q2H30M PRN IV PATENCY;  Start 3/23/20 at 

07:28;  Stop 3/23/20 at 19:27;  Status DC


Info (PHARMACY MONITORING -- do not chart) 1 each PRN DAILY  PRN MC SEE 

COMMENTS;  Start 3/23/20 at 07:30;  Stop 4/3/20 at 13:01;  Status DC


Metronidazole 100 ml @  100 mls/hr Q6HRS IV  Last administered on 4/8/20at 

06:26;  Start 3/23/20 at 08:30;  Stop 4/8/20 at 09:58;  Status DC


Micafungin Sodium 100 mg/Dextrose 100 ml @  100 mls/hr Q24H IV  Last 

administered on 4/30/20at 08:18;  Start 3/23/20 at 09:00;  Stop 4/30/20 at 

20:58;  Status DC


Propofol 0 ml @ As Directed STK-MED ONCE IV ;  Start 3/23/20 at 07:53;  Stop 

3/23/20 at 07:53;  Status DC


Etomidate (Amidate) 20 mg STK-MED ONCE IV ;  Start 3/23/20 at 07:53;  Stop 

3/23/20 at 07:54;  Status DC


Midazolam HCl (Versed) 5 mg STK-MED ONCE .ROUTE ;  Start 3/23/20 at 07:57;  Stop

3/23/20 at 07:57;  Status DC


Fentanyl Citrate 30 ml @ 0 mls/hr CONT  PRN IV SEE PROTOCOL Last administered on

4/17/20at 06:12;  Start 3/23/20 at 08:15;  Stop 4/17/20 at 09:19;  Status DC


Artificial Tears (Artificial Tears) 1 drop PRN Q1HR  PRN OU DRY EYE, 1st choice;

 Start 3/23/20 at 08:15;  Stop 4/29/20 at 05:31;  Status DC


Midazolam HCl 50 mg/Sodium Chloride 50 ml @ 0 mls/hr CONT  PRN IV SEE PROTOCOL 

Last administered on 3/26/20at 22:39;  Start 3/23/20 at 08:15;  Stop 3/28/20 at 

15:59;  Status DC


Etomidate (Amidate) 8 mg 1X  ONCE IV  Last administered on 3/23/20at 08:33;  

Start 3/23/20 at 08:30;  Stop 3/23/20 at 08:31;  Status DC


Succinylcholine Chloride (Anectine) 120 mg 1X  ONCE IV  Last administered on 

3/23/20at 08:34;  Start 3/23/20 at 08:30;  Stop 3/23/20 at 08:31;  Status DC


Midazolam HCl (Versed) 5 mg 1X  ONCE IV ;  Start 3/23/20 at 08:30;  Stop 3/23/20

at 08:31;  Status DC


Potassium Chloride 15 meq/ Bicarbonate Dialysis Soln w/ out KCl 5,007.5 ml  @ 

1,000 mls/ hr Q5H1M IV  Last administered on 3/24/20at 11:11;  Start 3/23/20 at 

12:00;  Stop 3/24/20 at 11:15;  Status DC


Potassium Chloride 15 meq/ Bicarbonate Dialysis Soln w/ out KCl 5,007.5 ml  @ 

1,000 mls/ hr Q5H1M IV  Last administered on 3/24/20at 11:12;  Start 3/23/20 at 

12:00;  Stop 3/24/20 at 11:17;  Status DC


Potassium Chloride 15 meq/ Bicarbonate Dialysis Soln w/ out KCl 5,007.5 ml  @ 

1,000 mls/ hr Q5H1M IV  Last administered on 3/24/20at 11:11;  Start 3/23/20 at 

12:00;  Stop 3/24/20 at 11:19;  Status DC


Sodium Chloride 90 meq/Potassium Chloride 15 meq/ Potassium Phosphate 10 mmol/ 

Magnesium Sulfate 10 meq/Calcium Gluconate 20 meq/ Multivitamins 10 ml/Chromium/

Copper/Manganese/ Seleni/Zn 0.5 ml/ Total Parenteral Nutrition/Amino 

Acids/Dextrose/ Fat Emulsion Intravenous 1,400 ml @  58.333 mls/ hr TPN  CONT IV

 Last administered on 3/23/20at 21:42;  Start 3/23/20 at 22:00;  Stop 3/24/20 at

21:59;  Status DC


Heparin Sodium (Porcine) (Heparin Sodium) 5,000 unit Q8HRS SQ  Last administered

on 3/28/20at 05:55;  Start 3/23/20 at 15:00;  Stop 3/28/20 at 13:28;  Status DC


Meropenem 500 mg/ Sodium Chloride 50 ml @  100 mls/hr Q6HRS IV  Last 

administered on 3/25/20at 06:00;  Start 3/24/20 at 09:00;  Stop 3/25/20 at 

07:29;  Status DC


Potassium Phosphate 20 mmol/ Sodium Chloride 106.6667 ml @  51.667 m... 1X  ONCE

IV  Last administered on 3/24/20at 11:22;  Start 3/24/20 at 10:15;  Stop 3/24/20

at 12:18;  Status DC


Acetaminophen (Tylenol Supp) 650 mg PRN Q6HRS  PRN MA MILD PAIN / TEMP > 100.3'F

Last administered on 6/10/20at 22:16;  Start 3/24/20 at 10:30


Potassium Chloride/Water 100 ml @  100 mls/hr Q1H IV  Last administered on 

3/24/20at 12:12;  Start 3/24/20 at 11:00;  Stop 3/24/20 at 12:59;  Status DC


Potassium Chloride 20 meq/ Bicarbonate Dialysis Soln w/ out KCl 5,010 ml @  

1,000 mls/hr Q5H1M IV  Last administered on 3/25/20at 08:48;  Start 3/24/20 at 

12:00;  Stop 3/25/20 at 13:03;  Status DC


Potassium Chloride 20 meq/ Bicarbonate Dialysis Soln w/ out KCl 5,010 ml @  

1,000 mls/hr Q5H1M IV  Last administered on 3/29/20at 14:52;  Start 3/24/20 at 

11:30;  Stop 3/29/20 at 19:59;  Status DC


Potassium Chloride 20 meq/ Bicarbonate Dialysis Soln w/ out KCl 5,010 ml @  

1,000 mls/hr Q5H1M IV  Last administered on 3/29/20at 14:53;  Start 3/24/20 at 

11:30;  Stop 3/29/20 at 19:59;  Status DC


Sodium Chloride 90 meq/Potassium Chloride 15 meq/ Potassium Phosphate 15 mmol/ 

Magnesium Sulfate 10 meq/Calcium Gluconate 15 meq/ Multivitamins 10 ml/Chromium/

Copper/Manganese/ Seleni/Zn 0.5 ml/ Total Parenteral Nutrition/Amino 

Acids/Dextrose/ Fat Emulsion Intravenous 1,400 ml @  58.333 mls/ hr TPN  CONT IV

 Last administered on 3/24/20at 22:17;  Start 3/24/20 at 22:00;  Stop 3/25/20 at

21:59;  Status DC


Cefepime HCl (Maxipime) 2 gm Q12HR IVP  Last administered on 4/7/20at 20:56;  

Start 3/25/20 at 09:00;  Stop 4/8/20 at 09:58;  Status DC


Daptomycin 500 mg/ Sodium Chloride 50 ml @  100 mls/hr Q48H IV  Last 

administered on 4/10/20at 09:57;  Start 3/25/20 at 08:30;  Stop 4/10/20 at 

10:07;  Status DC


Lidocaine HCl (Buffered Lidocaine 1%) 3 ml 1X  ONCE INJ  Last administered on 

3/25/20at 10:27;  Start 3/25/20 at 10:30;  Stop 3/25/20 at 10:31;  Status DC


Potassium Phosphate 20 mmol/ Sodium Chloride 106.6667 ml @  51.667 m... 1X  ONCE

IV  Last administered on 3/25/20at 12:51;  Start 3/25/20 at 13:00;  Stop 3/25/20

at 15:03;  Status DC


Sodium Chloride 90 meq/Potassium Chloride 15 meq/ Potassium Phosphate 18 mmol/ 

Magnesium Sulfate 8 meq/Calcium Gluconate 15 meq/ Multivitamins 10 ml/Chromium/ 

Copper/Manganese/ Seleni/Zn 0.5 ml/ Total Parenteral Nutrition/Amino 

Acids/Dextrose/ Fat Emulsion Intravenous 1,400 ml @  58.333 mls/ hr TPN  CONT IV

 Last administered on 3/25/20at 22:16;  Start 3/25/20 at 22:00;  Stop 3/26/20 at

21:59;  Status DC


Potassium Chloride 20 meq/ Bicarbonate Dialysis Soln w/ out KCl 5,010 ml @  

1,000 mls/hr Q5H1M IV  Last administered on 3/29/20at 14:54;  Start 3/25/20 at 

16:00;  Stop 3/29/20 at 19:59;  Status DC


Multi-Ingred Cream/Lotion/Oil/ Oint (Artificial Tears Eye Ointment) 1 thomas PRN 

Q1HR  PRN OU DRY EYE, 2nd choice Last administered on 4/13/20at 08:19;  Start 

3/25/20 at 17:30;  Stop 6/3/20 at 14:39;  Status DC


Sodium Chloride 90 meq/Potassium Chloride 15 meq/ Potassium Phosphate 18 mmol/ 

Magnesium Sulfate 8 meq/Calcium Gluconate 15 meq/ Multivitamins 10 ml/Chromium/ 

Copper/Manganese/ Seleni/Zn 0.5 ml/ Total Parenteral Nutrition/Amino 

Acids/Dextrose/ Fat Emulsion Intravenous 1,400 ml @  58.333 mls/ hr TPN  CONT IV

 Last administered on 3/26/20at 22:00;  Start 3/26/20 at 22:00;  Stop 3/27/20 at

21:59;  Status DC


Albumin Human 500 ml @  125 mls/hr 1X  ONCE IV ;  Start 3/26/20 at 14:15;  Stop 

3/26/20 at 18:14;  Status DC


Sodium Chloride 90 meq/Potassium Chloride 15 meq/ Potassium Phosphate 18 mmol/ 

Magnesium Sulfate 8 meq/Calcium Gluconate 15 meq/ Multivitamins 10 ml/Chromium/ 

Copper/Manganese/ Seleni/Zn 0.5 ml/ Insulin Human Regular 10 unit/ Total 

Parenteral Nutrition/Amino Acids/Dextrose/ Fat Emulsion Intravenous 1,400 ml @  

58.333 mls/ hr TPN  CONT IV  Last administered on 3/27/20at 21:43;  Start 

3/27/20 at 22:00;  Stop 3/28/20 at 21:59;  Status DC


Lidocaine HCl (Buffered Lidocaine 1%) 3 ml STK-MED ONCE .ROUTE ;  Start 3/25/20 

at 10:00;  Stop 3/27/20 at 13:57;  Status DC


Midazolam HCl 100 mg/Sodium Chloride 100 ml @ 7 mls/hr CONT  PRN IV SEE PROTOCOL

Last administered on 4/8/20at 15:35;  Start 3/28/20 at 16:00;  Stop 6/3/20 at 

14:38;  Status DC


Sodium Chloride 90 meq/Potassium Chloride 15 meq/ Potassium Phosphate 18 mmol/ 

Magnesium Sulfate 8 meq/Calcium Gluconate 15 meq/ Multivitamins 10 ml/Chromium/ 

Copper/Manganese/ Seleni/Zn 0.5 ml/ Insulin Human Regular 15 unit/ Total 

Parenteral Nutrition/Amino Acids/Dextrose/ Fat Emulsion Intravenous 1,400 ml @  

58.333 mls/ hr TPN  CONT IV  Last administered on 3/28/20at 20:34;  Start 

3/28/20 at 22:00;  Stop 3/29/20 at 21:59;  Status DC


Info ([facility] Electrolyte Protocol) 1 ea CONT PRN  PRN MC PER PROTOCOL;  Start 

3/29/20 at 13:15


Sodium Chloride 90 meq/Potassium Chloride 15 meq/ Potassium Phosphate 18 mmol/ 

Magnesium Sulfate 8 meq/Calcium Gluconate 15 meq/ Multivitamins 10 ml/Chromium/ 

Copper/Manganese/ Seleni/Zn 0.5 ml/ Insulin Human Regular 15 unit/ Total 

Parenteral Nutrition/Amino Acids/Dextrose/ Fat Emulsion Intravenous 1,400 ml @  

58.333 mls/ hr TPN  CONT IV  Last administered on 3/29/20at 22:05;  Start 

3/29/20 at 22:00;  Stop 3/30/20 at 21:59;  Status DC


Potassium Chloride 15 meq/ Bicarbonate Dialysis Soln w/ out KCl 5,007.5 ml  @ 

1,000 mls/ hr Q5H1M IV  Last administered on 4/1/20at 18:14;  Start 3/29/20 at 

20:00;  Stop 4/2/20 at 13:08;  Status DC


Potassium Chloride 15 meq/ Bicarbonate Dialysis Soln w/ out KCl 5,007.5 ml  @ 

1,000 mls/ hr Q5H1M IV  Last administered on 4/1/20at 18:14;  Start 3/29/20 at 

20:00;  Stop 4/2/20 at 13:08;  Status DC


Potassium Chloride 15 meq/ Bicarbonate Dialysis Soln w/ out KCl 5,007.5 ml  @ 

1,000 mls/ hr Q5H1M IV  Last administered on 4/1/20at 18:14;  Start 3/29/20 at 

20:00;  Stop 4/2/20 at 13:08;  Status DC


Iohexol (Omnipaque 240 Mg/ml) 30 ml 1X  ONCE PO  Last administered on 3/30/20at 

11:30;  Start 3/30/20 at 11:30;  Stop 3/30/20 at 11:33;  Status DC


Info (CONTRAST GIVEN -- Rx MONITORING) 1 each PRN DAILY  PRN MC SEE COMMENTS;  

Start 3/30/20 at 11:45;  Stop 4/1/20 at 11:44;  Status DC


Sodium Chloride 90 meq/Potassium Chloride 15 meq/ Potassium Phosphate 18 mmol/ 

Magnesium Sulfate 8 meq/Calcium Gluconate 15 meq/ Multivitamins 10 ml/Chromium/ 

Copper/Manganese/ Seleni/Zn 0.5 ml/ Insulin Human Regular 15 unit/ Total 

Parenteral Nutrition/Amino Acids/Dextrose/ Fat Emulsion Intravenous 1,400 ml @  

58.333 mls/ hr TPN  CONT IV  Last administered on 3/30/20at 21:47;  Start 

3/30/20 at 22:00;  Stop 3/31/20 at 21:59;  Status DC


Sodium Chloride 90 meq/Potassium Chloride 15 meq/ Potassium Phosphate 18 mmol/ 

Magnesium Sulfate 8 meq/Calcium Gluconate 15 meq/ Multivitamins 10 ml/Chromium/ 

Copper/Manganese/ Seleni/Zn 0.5 ml/ Insulin Human Regular 20 unit/ Total 

Parenteral Nutrition/Amino Acids/Dextrose/ Fat Emulsion Intravenous 1,400 ml @  

58.333 mls/ hr TPN  CONT IV  Last administered on 3/31/20at 21:36;  Start 

3/31/20 at 22:00;  Stop 4/1/20 at 21:59;  Status DC


Alteplase, Recombinant (Cathflo For Central Catheter Clearance) 1 mg 1X  ONCE 

INT CAT  Last administered on 3/31/20at 20:03;  Start 3/31/20 at 19:30;  Stop 

3/31/20 at 19:46;  Status DC


Alteplase, Recombinant (Cathflo For Central Catheter Clearance) 1 mg 1X  ONCE 

INT CAT  Last administered on 3/31/20at 22:05;  Start 3/31/20 at 22:00;  Stop 

3/31/20 at 22:01;  Status DC


Sodium Chloride 90 meq/Potassium Chloride 15 meq/ Potassium Phosphate 18 mmol/ 

Magnesium Sulfate 8 meq/Calcium Gluconate 15 meq/ Multivitamins 10 ml/Chromium/ 

Copper/Manganese/ Seleni/Zn 0.5 ml/ Insulin Human Regular 20 unit/ Total 

Parenteral Nutrition/Amino Acids/Dextrose/ Fat Emulsion Intravenous 1,400 ml @  

58.333 mls/ hr TPN  CONT IV  Last administered on 4/1/20at 21:30;  Start 4/1/20 

at 22:00;  Stop 4/2/20 at 21:59;  Status DC


Dexmedetomidine HCl 400 mcg/ Sodium Chloride 100 ml @ 0 mls/hr CONT  PRN IV 

ANXIETY / AGITATION Last administered on 5/30/20at 12:57;  Start 4/2/20 at 

08:15;  Stop 5/30/20 at 18:31;  Status DC


Sodium Chloride 500 ml @  500 mls/hr 1X PRN  PRN IV ELEVATED BP, SEE COMMENTS;  

Start 4/2/20 at 08:15


Atropine Sulfate (ATROPINE 0.5mg SYRINGE) 0.5 mg PRN Q5MIN  PRN IV SEE COMMENTS;

 Start 4/2/20 at 08:15


Furosemide (Lasix) 20 mg 1X  ONCE IVP  Last administered on 4/2/20at 08:19;  

Start 4/2/20 at 08:15;  Stop 4/2/20 at 08:16;  Status DC


Lidocaine HCl (Buffered Lidocaine 1%) 3 ml STK-MED ONCE .ROUTE ;  Start 4/2/20 

at 08:39;  Stop 4/2/20 at 08:39;  Status DC


Lidocaine HCl (Buffered Lidocaine 1%) 6 ml 1X  ONCE INJ  Last administered on 

4/2/20at 09:05;  Start 4/2/20 at 09:00;  Stop 4/2/20 at 09:06;  Status DC


Sodium Chloride 90 meq/Potassium Chloride 15 meq/ Potassium Phosphate 18 mmol/ 

Magnesium Sulfate 8 meq/Calcium Gluconate 15 meq/ Multivitamins 10 ml/Chromium/ 

Copper/Manganese/ Seleni/Zn 0.5 ml/ Insulin Human Regular 20 unit/ Total 

Parenteral Nutrition/Amino Acids/Dextrose/ Fat Emulsion Intravenous 1,400 ml @  

58.333 mls/ hr TPN  CONT IV  Last administered on 4/2/20at 22:45;  Start 4/2/20 

at 22:00;  Stop 4/3/20 at 21:59;  Status DC


Sodium Chloride 1,000 ml @  1,000 mls/hr Q1H PRN IV hypotension;  Start 4/3/20 

at 07:30;  Stop 4/3/20 at 13:29;  Status DC


Albumin Human 200 ml @  200 mls/hr 1X PRN  PRN IV Hypotension Last administered 

on 4/3/20at 09:36;  Start 4/3/20 at 07:30;  Stop 4/3/20 at 13:29;  Status DC


Sodium Chloride (Normal Saline Flush) 10 ml 1X PRN  PRN IV AP catheter pack;  

Start 4/3/20 at 07:30;  Stop 4/3/20 at 21:29;  Status DC


Sodium Chloride (Normal Saline Flush) 10 ml 1X PRN  PRN IV  catheter pack;  

Start 4/3/20 at 07:30;  Stop 4/4/20 at 07:29;  Status DC


Sodium Chloride 1,000 ml @  400 mls/hr Q2H30M PRN IV PATENCY;  Start 4/3/20 at 

07:30;  Stop 4/3/20 at 19:29;  Status DC


Info (PHARMACY MONITORING -- do not chart) 1 each PRN DAILY  PRN MC SEE 

COMMENTS;  Start 4/3/20 at 07:30;  Stop 4/3/20 at 13:02;  Status DC


Info (PHARMACY MONITORING -- do not chart) 1 each PRN DAILY  PRN MC SEE 

COMMENTS;  Start 4/3/20 at 07:30;  Stop 4/5/20 at 12:45;  Status DC


Sodium Chloride 90 meq/Potassium Chloride 15 meq/ Potassium Phosphate 10 mmol/ 

Magnesium Sulfate 8 meq/Calcium Gluconate 15 meq/ Multivitamins 10 ml/Chromium/ 

Copper/Manganese/ Seleni/Zn 0.5 ml/ Insulin Human Regular 25 unit/ Total Pare

nteral Nutrition/Amino Acids/Dextrose/ Fat Emulsion Intravenous 1,400 ml @  

58.333 mls/ hr TPN  CONT IV  Last administered on 4/3/20at 22:19;  Start 4/3/20 

at 22:00;  Stop 4/4/20 at 21:59;  Status DC


Heparin Sodium (Porcine) (Heparin Sodium) 5,000 unit Q12HR SQ  Last administered

on 4/26/20at 08:59;  Start 4/3/20 at 21:00;  Stop 4/26/20 at 10:05;  Status DC


Ondansetron HCl (Zofran) 4 mg PRN Q6HRS  PRN IV NAUSEA/VOMITING;  Start 4/6/20 

at 07:00;  Stop 4/7/20 at 06:59;  Status DC


Fentanyl Citrate (Fentanyl 2ml Vial) 25 mcg PRN Q5MIN  PRN IV MILD PAIN 1-3;  

Start 4/6/20 at 07:00;  Stop 4/7/20 at 06:59;  Status DC


Fentanyl Citrate (Fentanyl 2ml Vial) 50 mcg PRN Q5MIN  PRN IV MODERATE TO SEVERE

PAIN;  Start 4/6/20 at 07:00;  Stop 4/7/20 at 06:59;  Status DC


Ringer's Solution 1,000 ml @  30 mls/hr Q24H IV ;  Start 4/6/20 at 07:00;  Stop 

4/6/20 at 18:59;  Status DC


Lidocaine HCl (Xylocaine-Mpf 1% 2ml Vial) 2 ml PRN 1X  PRN ID PRIOR TO IV START;

 Start 4/6/20 at 07:00;  Stop 4/7/20 at 06:59;  Status DC


Prochlorperazine Edisylate (Compazine) 5 mg PACU PRN  PRN IV NAUSEA, MRX1;  

Start 4/6/20 at 07:00;  Stop 4/7/20 at 06:59;  Status DC


Sodium Chloride 1,000 ml @  1,000 mls/hr Q1H PRN IV hypotension;  Start 4/4/20 

at 09:10;  Stop 4/4/20 at 15:09;  Status DC


Albumin Human 200 ml @  200 mls/hr 1X PRN  PRN IV Hypotension Last administered 

on 4/4/20at 10:10;  Start 4/4/20 at 09:15;  Stop 4/4/20 at 15:14;  Status DC


Sodium Chloride 1,000 ml @  400 mls/hr Q2H30M PRN IV PATENCY;  Start 4/4/20 at 

09:10;  Stop 4/4/20 at 21:09;  Status DC


Info (PHARMACY MONITORING -- do not chart) 1 each PRN DAILY  PRN MC SEE 

COMMENTS;  Start 4/4/20 at 09:15;  Stop 4/5/20 at 12:45;  Status DC


Info (PHARMACY MONITORING -- do not chart) 1 each PRN DAILY  PRN MC SEE 

COMMENTS;  Start 4/4/20 at 09:15;  Stop 4/5/20 at 12:45;  Status DC


Sodium Chloride 90 meq/Potassium Chloride 15 meq/ Potassium Phosphate 10 mmol/ 

Magnesium Sulfate 8 meq/Calcium Gluconate 15 meq/ Multivitamins 10 ml/Chromium/ 

Copper/Manganese/ Seleni/Zn 0.5 ml/ Insulin Human Regular 25 unit/ Total 

Parenteral Nutrition/Amino Acids/Dextrose/ Fat Emulsion Intravenous 1,400 ml @  

58.333 mls/ hr TPN  CONT IV  Last administered on 4/4/20at 22:10;  Start 4/4/20 

at 22:00;  Stop 4/5/20 at 21:59;  Status DC


Magnesium Sulfate 50 ml @ 25 mls/hr PRN DAILY  PRN IV for Mag < 1.7 on am labs 

Last administered on 6/16/20at 08:21;  Start 4/5/20 at 09:15


Sodium Chloride 90 meq/Potassium Chloride 15 meq/ Potassium Phosphate 10 mmol/ 

Magnesium Sulfate 8 meq/Calcium Gluconate 15 meq/ Multivitamins 10 ml/Chromium/ 

Copper/Manganese/ Seleni/Zn 0.5 ml/ Insulin Human Regular 25 unit/ Total 

Parenteral Nutrition/Amino Acids/Dextrose/ Fat Emulsion Intravenous 1,400 ml @  

58.333 mls/ hr TPN  CONT IV  Last administered on 4/5/20at 21:20;  Start 4/5/20 

at 22:00;  Stop 4/6/20 at 21:59;  Status DC


Sodium Chloride 1,000 ml @  1,000 mls/hr Q1H PRN IV hypotension;  Start 4/5/20 

at 12:23;  Stop 4/5/20 at 18:22;  Status DC


Albumin Human 200 ml @  200 mls/hr 1X  ONCE IV  Last administered on 4/5/20at 

13:34;  Start 4/5/20 at 12:30;  Stop 4/5/20 at 13:29;  Status DC


Diphenhydramine HCl (Benadryl) 25 mg 1X PRN  PRN IV ITCHING;  Start 4/5/20 at 

12:30;  Stop 4/6/20 at 12:29;  Status DC


Diphenhydramine HCl (Benadryl) 25 mg 1X PRN  PRN IV ITCHING;  Start 4/5/20 at 

12:30;  Stop 4/6/20 at 12:29;  Status DC


Info (PHARMACY MONITORING -- do not chart) 1 each PRN DAILY  PRN MC SEE 

COMMENTS;  Start 4/5/20 at 12:30;  Status Cancel


Bupivacaine HCl/ Epinephrine Bitart (Sensorcain-Epi 0.5%-1:976224 Mpf) 30 ml 

STK-MED ONCE .ROUTE  Last administered on 4/6/20at 11:44;  Start 4/6/20 at 

11:00;  Stop 4/6/20 at 11:01;  Status DC


Cellulose (Surgicel Fibrillar 1x2) 1 each STK-MED ONCE .ROUTE ;  Start 4/6/20 at

11:00;  Stop 4/6/20 at 11:01;  Status DC


Sodium Chloride 90 meq/Potassium Chloride 15 meq/ Potassium Phosphate 10 mmol/ 

Magnesium Sulfate 12 meq/Calcium Gluconate 15 meq/ Multivitamins 10 ml/Chromium/

Copper/Manganese/ Seleni/Zn 0.5 ml/ Insulin Human Regular 25 unit/ Total 

Parenteral Nutrition/Amino Acids/Dextrose/ Fat Emulsion Intravenous 1,400 ml @  

58.333 mls/ hr TPN  CONT IV  Last administered on 4/6/20at 22:24;  Start 4/6/20 

at 22:00;  Stop 4/7/20 at 21:59;  Status DC


Propofol 20 ml @ As Directed STK-MED ONCE IV ;  Start 4/6/20 at 11:07;  Stop 

4/6/20 at 11:07;  Status DC


Cellulose (Surgicel Hemostat 4x8) 1 each STK-MED ONCE .ROUTE  Last administered 

on 4/6/20at 11:44;  Start 4/6/20 at 11:55;  Stop 4/6/20 at 11:56;  Status DC


Sevoflurane (Ultane) 60 ml STK-MED ONCE IH ;  Start 4/6/20 at 12:46;  Stop 

4/6/20 at 12:46;  Status DC


Sodium Chloride 1,000 ml @  1,000 mls/hr Q1H PRN IV hypotension;  Start 4/6/20 

at 13:51;  Stop 4/6/20 at 19:50;  Status DC


Albumin Human 200 ml @  200 mls/hr 1X PRN  PRN IV Hypotension Last administered 

on 4/6/20at 14:51;  Start 4/6/20 at 14:00;  Stop 4/6/20 at 19:59;  Status DC


Diphenhydramine HCl (Benadryl) 25 mg 1X PRN  PRN IV ITCHING;  Start 4/6/20 at 

14:00;  Stop 4/7/20 at 13:59;  Status DC


Diphenhydramine HCl (Benadryl) 25 mg 1X PRN  PRN IV ITCHING;  Start 4/6/20 at 

14:00;  Stop 4/7/20 at 13:59;  Status DC


Sodium Chloride 1,000 ml @  400 mls/hr Q2H30M PRN IV PATENCY;  Start 4/6/20 at 

13:51;  Stop 4/7/20 at 01:50;  Status DC


Info (PHARMACY MONITORING -- do not chart) 1 each PRN DAILY  PRN MC SEE 

COMMENTS;  Start 4/6/20 at 14:00;  Stop 4/9/20 at 08:16;  Status DC


Heparin Sodium (Porcine) (Hep Lock Adult) 500 unit STK-MED ONCE IVP ;  Start 

4/7/20 at 09:29;  Stop 4/7/20 at 09:30;  Status DC


Sodium Chloride 1,000 ml @  1,000 mls/hr Q1H PRN IV hypotension;  Start 4/7/20 

at 10:43;  Stop 4/7/20 at 16:42;  Status DC


Sodium Chloride 1,000 ml @  400 mls/hr Q2H30M PRN IV PATENCY;  Start 4/7/20 at 

10:43;  Stop 4/7/20 at 22:42;  Status DC


Info (PHARMACY MONITORING -- do not chart) 1 each PRN DAILY  PRN MC SEE 

COMMENTS;  Start 4/7/20 at 10:45;  Status UNV


Info (PHARMACY MONITORING -- do not chart) 1 each PRN DAILY  PRN MC SEE 

COMMENTS;  Start 4/7/20 at 10:45;  Status UNV


Sodium Chloride 90 meq/Potassium Chloride 15 meq/ Magnesium Sulfate 12 

meq/Calcium Gluconate 15 meq/ Multivitamins 10 ml/Chromium/ Copper/Manganese/ 

Seleni/Zn 0.5 ml/ Insulin Human Regular 25 unit/ Total Parenteral 

Nutrition/Amino Acids/Dextrose/ Fat Emulsion Intravenous 1,400 ml @  58.333 mls/

hr TPN  CONT IV  Last administered on 4/7/20at 22:13;  Start 4/7/20 at 22:00;  

Stop 4/8/20 at 21:59;  Status DC


Sodium Chloride 1,000 ml @  1,000 mls/hr Q1H PRN IV hypotension;  Start 4/8/20 

at 07:50;  Stop 4/8/20 at 13:49;  Status DC


Albumin Human 200 ml @  200 mls/hr 1X  ONCE IV ;  Start 4/8/20 at 08:00;  Stop 

4/8/20 at 08:53;  Status DC


Diphenhydramine HCl (Benadryl) 25 mg 1X PRN  PRN IV ITCHING;  Start 4/8/20 at 

08:00;  Stop 4/9/20 at 07:59;  Status DC


Diphenhydramine HCl (Benadryl) 25 mg 1X PRN  PRN IV ITCHING;  Start 4/8/20 at 

08:00;  Stop 4/9/20 at 07:59;  Status DC


Info (PHARMACY MONITORING -- do not chart) 1 each PRN DAILY  PRN MC SEE CO

MMENTS;  Start 4/8/20 at 08:00;  Stop 4/9/20 at 08:16;  Status DC


Albumin Human 50 ml @ 50 mls/hr 1X  ONCE IV ;  Start 4/8/20 at 08:53;  Stop 

4/8/20 at 08:56;  Status DC


Albumin Human 200 ml @  50 mls/hr PRN 1X  PRN IV HYPOTENSION Last administered 

on 4/14/20at 11:54;  Start 4/8/20 at 09:00;  Stop 5/21/20 at 11:14;  Status DC


Meropenem 500 mg/ Sodium Chloride 50 ml @  100 mls/hr Q12H IV  Last administered

on 4/28/20at 10:45;  Start 4/8/20 at 10:00;  Stop 4/28/20 at 12:37;  Status DC


Sodium Chloride 90 meq/Magnesium Sulfate 12 meq/ Calcium Gluconate 15 meq/ 

Multivitamins 10 ml/Chromium/ Copper/Manganese/ Seleni/Zn 0.5 ml/ Insulin Human 

Regular 25 unit/ Total Parenteral Nutrition/Amino Acids/Dextrose/ Fat Emulsion 

Intravenous 1,400 ml @  58.333 mls/ hr TPN  CONT IV  Last administered on 

4/8/20at 21:41;  Start 4/8/20 at 22:00;  Stop 4/9/20 at 21:59;  Status DC


Sodium Chloride 1,000 ml @  1,000 mls/hr Q1H PRN IV hypotension;  Start 4/9/20 

at 07:58;  Stop 4/9/20 at 13:57;  Status DC


Albumin Human 200 ml @  200 mls/hr 1X PRN  PRN IV Hypotension Last administered 

on 4/9/20at 09:30;  Start 4/9/20 at 08:00;  Stop 4/9/20 at 13:59;  Status DC


Sodium Chloride 1,000 ml @  400 mls/hr Q2H30M PRN IV PATENCY;  Start 4/9/20 at 

07:58;  Stop 4/9/20 at 19:57;  Status DC


Info (PHARMACY MONITORING -- do not chart) 1 each PRN DAILY  PRN MC SEE 

COMMENTS;  Start 4/9/20 at 08:00;  Status Cancel


Info (PHARMACY MONITORING -- do not chart) 1 each PRN DAILY  PRN MC SEE 

COMMENTS;  Start 4/9/20 at 08:15;  Status UNV


Sodium Chloride 90 meq/Potassium Phosphate 5 mmol/ Magnesium Sulfate 12 

meq/Calcium Gluconate 15 meq/ Multivitamins 10 ml/Chromium/ Copper/Manganese/ 

Seleni/Zn 0.5 ml/ Insulin Human Regular 30 unit/ Total Parenteral 

Nutrition/Amino Acids/Dextrose/ Fat Emulsion Intravenous 1,400 ml @  58.333 mls/

hr TPN  CONT IV  Last administered on 4/9/20at 22:08;  Start 4/9/20 at 22:00;  

Stop 4/10/20 at 21:59;  Status DC


Linezolid/Dextrose 300 ml @  300 mls/hr Q12HR IV  Last administered on 4/20/20at

20:40;  Start 4/10/20 at 11:00;  Stop 4/21/20 at 08:10;  Status DC


Sodium Chloride 90 meq/Potassium Phosphate 15 mmol/ Magnesium Sulfate 12 

meq/Calcium Gluconate 15 meq/ Multivitamins 10 ml/Chromium/ Copper/Manganese/ 

Seleni/Zn 0.5 ml/ Insulin Human Regular 30 unit/ Total Parenteral 

Nutrition/Amino Acids/Dextrose/ Fat Emulsion Intravenous 1,400 ml @  58.333 mls/

hr TPN  CONT IV  Last administered on 4/10/20at 21:49;  Start 4/10/20 at 22:00; 

Stop 4/11/20 at 21:59;  Status DC


Sodium Chloride 90 meq/Potassium Phosphate 15 mmol/ Magnesium Sulfate 12 meq/Hunter

cium Gluconate 15 meq/ Multivitamins 10 ml/Chromium/ Copper/Manganese/ Seleni/Zn

0.5 ml/ Insulin Human Regular 40 unit/ Total Parenteral Nutrition/Amino 

Acids/Dextrose/ Fat Emulsion Intravenous 1,400 ml @  58.333 mls/ hr TPN  CONT IV

 Last administered on 4/11/20at 21:21;  Start 4/11/20 at 22:00;  Stop 4/12/20 at

21:59;  Status DC


Sodium Chloride 1,000 ml @  1,000 mls/hr Q1H PRN IV hypotension;  Start 4/11/20 

at 13:26;  Stop 4/11/20 at 19:25;  Status DC


Albumin Human 200 ml @  200 mls/hr 1X PRN  PRN IV Hypotension Last administered 

on 4/11/20at 15:00;  Start 4/11/20 at 13:30;  Stop 4/11/20 at 19:29;  Status DC


Sodium Chloride (Normal Saline Flush) 10 ml 1X PRN  PRN IV AP catheter pack;  

Start 4/11/20 at 13:30;  Stop 4/12/20 at 13:29;  Status DC


Sodium Chloride (Normal Saline Flush) 10 ml 1X PRN  PRN IV  catheter pack;  

Start 4/11/20 at 13:30;  Stop 4/12/20 at 13:29;  Status DC


Sodium Chloride 1,000 ml @  400 mls/hr Q2H30M PRN IV PATENCY;  Start 4/11/20 at 

13:26;  Stop 4/12/20 at 01:25;  Status DC


Info (PHARMACY MONITORING -- do not chart) 1 each PRN DAILY  PRN MC SEE 

COMMENTS;  Start 4/11/20 at 13:30;  Stop 4/11/20 at 13:33;  Status DC


Info (PHARMACY MONITORING -- do not chart) 1 each PRN DAILY  PRN MC SEE 

COMMENTS;  Start 4/11/20 at 13:30;  Stop 4/11/20 at 13:34;  Status DC


Sodium Chloride 90 meq/Potassium Phosphate 19 mmol/ Magnesium Sulfate 12 

meq/Calcium Gluconate 15 meq/ Multivitamins 10 ml/Chromium/ Copper/Manganese/ 

Seleni/Zn 0.5 ml/ Insulin Human Regular 40 unit/ Total Parenteral 

Nutrition/Amino Acids/Dextrose/ Fat Emulsion Intravenous 1,400 ml @  58.333 mls/

hr TPN  CONT IV  Last administered on 4/12/20at 21:54;  Start 4/12/20 at 22:00; 

Stop 4/13/20 at 21:59;  Status DC


Sodium Chloride 1,000 ml @  1,000 mls/hr Q1H PRN IV hypotension;  Start 4/13/20 

at 09:35;  Stop 4/13/20 at 15:34;  Status DC


Albumin Human 200 ml @  200 mls/hr 1X PRN  PRN IV Hypotension;  Start 4/13/20 at

09:45;  Stop 4/13/20 at 15:44;  Status DC


Diphenhydramine HCl (Benadryl) 25 mg 1X PRN  PRN IV ITCHING;  Start 4/13/20 at 

09:45;  Stop 4/14/20 at 09:44;  Status DC


Diphenhydramine HCl (Benadryl) 25 mg 1X PRN  PRN IV ITCHING;  Start 4/13/20 at 

09:45;  Stop 4/14/20 at 09:44;  Status DC


Sodium Chloride 1,000 ml @  400 mls/hr Q2H30M PRN IV PATENCY;  Start 4/13/20 at 

09:35;  Stop 4/13/20 at 21:34;  Status DC


Info (PHARMACY MONITORING -- do not chart) 1 each PRN DAILY  PRN MC SEE 

COMMENTS;  Start 4/13/20 at 09:45;  Status Cancel


Sodium Chloride 100 meq/Potassium Phosphate 19 mmol/ Magnesium Sulfate 12 

meq/Calcium Gluconate 15 meq/ Multivitamins 10 ml/Chromium/ Copper/Manganese/ 

Seleni/Zn 0.5 ml/ Insulin Human Regular 40 unit/ Potassium Chloride 20 meq/ 

Total Parenteral Nutrition/Amino Acids/Dextrose/ Fat Emulsion Intravenous 1,400 

ml @  58.333 mls/ hr TPN  CONT IV  Last administered on 4/13/20at 22:02;  Start 

4/13/20 at 22:00;  Stop 4/14/20 at 21:59;  Status DC


Furosemide (Lasix) 40 mg 1X  ONCE IVP  Last administered on 4/13/20at 14:39;  

Start 4/13/20 at 14:30;  Stop 4/13/20 at 14:31;  Status DC


Metronidazole 100 ml @  100 mls/hr Q8HRS IV  Last administered on 4/21/20at 

06:04;  Start 4/14/20 at 10:00;  Stop 4/21/20 at 08:10;  Status DC


Sodium Chloride 1,000 ml @  1,000 mls/hr Q1H PRN IV hypotension;  Start 4/14/20 

at 08:00;  Stop 4/14/20 at 13:59;  Status DC


Albumin Human 200 ml @  200 mls/hr 1X PRN  PRN IV Hypotension;  Start 4/14/20 at

08:00;  Stop 4/14/20 at 13:59;  Status DC


Sodium Chloride 1,000 ml @  400 mls/hr Q2H30M PRN IV PATENCY;  Start 4/14/20 at 

08:00;  Stop 4/14/20 at 19:59;  Status DC


Info (PHARMACY MONITORING -- do not chart) 1 each PRN DAILY  PRN MC SEE 

COMMENTS;  Start 4/14/20 at 11:30;  Status UNV


Info (PHARMACY MONITORING -- do not chart) 1 each PRN DAILY  PRN MC SEE 

COMMENTS;  Start 4/14/20 at 11:30;  Stop 4/16/20 at 12:13;  Status DC


Sodium Chloride 100 meq/Potassium Phosphate 19 mmol/ Magnesium Sulfate 12 

meq/Calcium Gluconate 15 meq/ Multivitamins 10 ml/Chromium/ Copper/Manganese/ 

Seleni/Zn 0.5 ml/ Insulin Human Regular 40 unit/ Potassium Chloride 20 meq/ 

Total Parenteral Nutrition/Amino Acids/Dextrose/ Fat Emulsion Intravenous 1,400 

ml @  58.333 mls/ hr TPN  CONT IV  Last administered on 4/14/20at 21:52;  Start 

4/14/20 at 22:00;  Stop 4/15/20 at 21:59;  Status DC


Sodium Chloride (Normal Saline Flush) 10 ml QSHIFT  PRN IV AFTER MEDS AND BLOOD 

DRAWS;  Start 4/14/20 at 15:00;  Stop 5/12/20 at 11:27;  Status DC


Sodium Chloride (Normal Saline Flush) 10 ml PRN Q5MIN  PRN IV AFTER MEDS AND 

BLOOD DRAWS;  Start 4/14/20 at 15:00


Sodium Chloride (Normal Saline Flush) 20 ml PRN Q5MIN  PRN IV AFTER MEDS AND 

BLOOD DRAWS;  Start 4/14/20 at 15:00


Sodium Chloride 100 meq/Potassium Phosphate 19 mmol/ Magnesium Sulfate 12 

meq/Calcium Gluconate 15 meq/ Multivitamins 10 ml/Chromium/ Copper/Manganese/ 

Seleni/Zn 0.5 ml/ Insulin Human Regular 40 unit/ Potassium Chloride 20 meq/ 

Total Parenteral Nutrition/Amino Acids/Dextrose/ Fat Emulsion Intravenous 1,400 

ml @  58.333 mls/ hr TPN  CONT IV  Last administered on 4/15/20at 21:20;  Start 

4/15/20 at 22:00;  Stop 4/16/20 at 21:59;  Status DC


Lidocaine HCl (Buffered Lidocaine 1%) 3 ml STK-MED ONCE .ROUTE ;  Start 4/15/20 

at 13:16;  Stop 4/15/20 at 13:16;  Status DC


Lidocaine HCl (Buffered Lidocaine 1%) 6 ml 1X  ONCE INJ  Last administered on 

4/15/20at 13:45;  Start 4/15/20 at 13:30;  Stop 4/15/20 at 13:31;  Status DC


Albumin Human 100 ml @  100 mls/hr 1X  ONCE IV  Last administered on 4/15/20at 

15:41;  Start 4/15/20 at 15:00;  Stop 4/15/20 at 15:59;  Status DC


Albumin Human 50 ml @ 50 mls/hr 1X  ONCE IV  Last administered on 4/15/20at 

15:00;  Start 4/15/20 at 15:00;  Stop 4/15/20 at 15:59;  Status DC


Info (PHARMACY MONITORING -- do not chart) 1 each PRN DAILY  PRN MC SEE 

COMMENTS;  Start 4/16/20 at 11:30;  Status Cancel


Info (PHARMACY MONITORING -- do not chart) 1 each PRN DAILY  PRN MC SEE 

COMMENTS;  Start 4/16/20 at 11:30;  Status UNV


Sodium Chloride 100 meq/Potassium Phosphate 10 mmol/ Magnesium Sulfate 12 

meq/Calcium Gluconate 15 meq/ Multivitamins 10 ml/Chromium/ Copper/Manganese/ 

Seleni/Zn 0.5 ml/ Insulin Human Regular 35 unit/ Potassium Chloride 20 meq/ 

Total Parenteral Nutrition/Amino Acids/Dextrose/ Fat Emulsion Intravenous 1,400 

ml @  58.333 mls/ hr TPN  CONT IV  Last administered on 4/16/20at 22:10;  Start 

4/16/20 at 22:00;  Stop 4/17/20 at 21:59;  Status DC


Sodium Chloride 100 meq/Potassium Phosphate 5 mmol/ Magnesium Sulfate 12 

meq/Calcium Gluconate 15 meq/ Multivitamins 10 ml/Chromium/ Copper/Manganese/ 

Seleni/Zn 0.5 ml/ Insulin Human Regular 35 unit/ Potassium Chloride 20 meq/ 

Total Parenteral Nutrition/Amino Acids/Dextrose/ Fat Emulsion Intravenous 1,400 

ml @  58.333 mls/ hr TPN  CONT IV  Last administered on 4/17/20at 22:59;  Start 

4/17/20 at 22:00;  Stop 4/18/20 at 21:59;  Status DC


Sodium Chloride 1,000 ml @  1,000 mls/hr Q1H PRN IV hypotension;  Start 4/18/20 

at 08:27;  Stop 4/18/20 at 14:26;  Status DC


Albumin Human 200 ml @  200 mls/hr 1X PRN  PRN IV Hypotension Last administered 

on 4/18/20at 09:18;  Start 4/18/20 at 08:30;  Stop 4/18/20 at 14:29;  Status DC


Sodium Chloride 1,000 ml @  400 mls/hr Q2H30M PRN IV PATENCY;  Start 4/18/20 at 

08:27;  Stop 4/18/20 at 20:26;  Status DC


Info (PHARMACY MONITORING -- do not chart) 1 each PRN DAILY  PRN MC SEE 

COMMENTS;  Start 4/18/20 at 08:30;  Status Cancel


Info (PHARMACY MONITORING -- do not chart) 1 each PRN DAILY  PRN MC SEE 

COMMENTS;  Start 4/18/20 at 08:30;  Stop 4/26/20 at 13:10;  Status DC


Sodium Chloride 100 meq/Potassium Chloride 40 meq/ Magnesium Sulfate 15 

meq/Calcium Gluconate 15 meq/ Multivitamins 10 ml/Chromium/ Copper/Manganese/ 

Seleni/Zn 0.5 ml/ Insulin Human Regular 35 unit/ Total Parenteral 

Nutrition/Amino Acids/Dextrose/ Fat Emulsion Intravenous 1,400 ml @  58.333 mls/

hr TPN  CONT IV  Last administered on 4/18/20at 22:00;  Start 4/18/20 at 22:00; 

Stop 4/19/20 at 21:59;  Status DC


Potassium Chloride/Water 100 ml @  100 mls/hr 1X  ONCE IV  Last administered on 

4/18/20at 17:28;  Start 4/18/20 at 14:45;  Stop 4/18/20 at 15:44;  Status DC


Sodium Chloride 100 meq/Potassium Chloride 40 meq/ Magnesium Sulfate 15 meq/Hunter

cium Gluconate 15 meq/ Multivitamins 10 ml/Chromium/ Copper/Manganese/ Seleni/Zn

0.5 ml/ Insulin Human Regular 35 unit/ Total Parenteral Nutrition/Amino 

Acids/Dextrose/ Fat Emulsion Intravenous 1,400 ml @  58.333 mls/ hr TPN  CONT IV

 Last administered on 4/19/20at 22:46;  Start 4/19/20 at 22:00;  Stop 4/20/20 at

21:59;  Status DC


Sodium Chloride 100 meq/Potassium Chloride 40 meq/ Magnesium Sulfate 20 

meq/Calcium Gluconate 15 meq/ Multivitamins 10 ml/Chromium/ Copper/Manganese/ 

Seleni/Zn 0.5 ml/ Insulin Human Regular 35 unit/ Total Parenteral 

Nutrition/Amino Acids/Dextrose/ Fat Emulsion Intravenous 1,400 ml @  58.333 mls/

hr TPN  CONT IV  Last administered on 4/20/20at 22:31;  Start 4/20/20 at 22:00; 

Stop 4/21/20 at 21:59;  Status DC


Fentanyl Citrate (Fentanyl 2ml Vial) 50 mcg PRN Q2HR  PRN IVP PAIN Last 

administered on 4/27/20at 13:32;  Start 4/20/20 at 21:00;  Stop 4/28/20 at 12:5

3;  Status DC


Fentanyl Citrate (Fentanyl 2ml Vial) 25 mcg PRN Q2HR  PRN IVP PAIN;  Start 

4/20/20 at 21:00;  Stop 4/28/20 at 12:54;  Status DC


Enoxaparin Sodium (Lovenox 100mg Syringe) 100 mg Q12HR SQ ;  Start 4/21/20 at 

21:00;  Status UNV


Amino Acids/ Glycerin/ Electrolytes 1,000 ml @  75 mls/hr [id number] IV ;  Start 

4/20/20 at 21:15;  Status UNV


Sodium Chloride 1,000 ml @  1,000 mls/hr Q1H PRN IV hypotension;  Start 4/21/20 

at 07:56;  Stop 4/21/20 at 13:55;  Status DC


Albumin Human 200 ml @  200 mls/hr 1X PRN  PRN IV Hypotension Last administered 

on 4/21/20at 08:40;  Start 4/21/20 at 08:00;  Stop 4/21/20 at 13:59;  Status DC


Sodium Chloride 1,000 ml @  400 mls/hr Q2H30M PRN IV PATENCY;  Start 4/21/20 at 

07:56;  Stop 4/21/20 at 19:55;  Status DC


Info (PHARMACY MONITORING -- do not chart) 1 each PRN DAILY  PRN MC SEE 

COMMENTS;  Start 4/21/20 at 08:00;  Status UNV


Info (PHARMACY MONITORING -- do not chart) 1 each PRN DAILY  PRN MC SEE 

COMMENTS;  Start 4/21/20 at 08:00;  Status UNV


Daptomycin 430 mg/ Sodium Chloride 50 ml @  100 mls/hr Q24H IV  Last 

administered on 4/21/20at 12:35;  Start 4/21/20 at 09:00;  Stop 4/21/20 at 

12:49;  Status DC


Sodium Chloride 100 meq/Potassium Chloride 40 meq/ Magnesium Sulfate 20 

meq/Calcium Gluconate 15 meq/ Multivitamins 10 ml/Chromium/ Copper/Manganese/ 

Seleni/Zn 0.5 ml/ Insulin Human Regular 35 unit/ Total Parenteral 

Nutrition/Amino Acids/Dextrose/ Fat Emulsion Intravenous 1,400 ml @  58.333 mls/

hr TPN  CONT IV  Last administered on 4/21/20at 21:26;  Start 4/21/20 at 22:00; 

Stop 4/22/20 at 21:59;  Status DC


Daptomycin 430 mg/ Sodium Chloride 50 ml @  100 mls/hr Q48H IV ;  Start 4/23/20 

at 09:00;  Stop 4/22/20 at 11:55;  Status DC


Sodium Chloride 100 meq/Potassium Chloride 40 meq/ Magnesium Sulfate 20 

meq/Calcium Gluconate 15 meq/ Multivitamins 10 ml/Chromium/ Copper/Manganese/ 

Seleni/Zn 0.5 ml/ Insulin Human Regular 35 unit/ Total Parenteral Nutrition/Ami

no Acids/Dextrose/ Fat Emulsion Intravenous 1,400 ml @  58.333 mls/ hr TPN  CONT

IV  Last administered on 4/22/20at 22:27;  Start 4/22/20 at 22:00;  Stop 4/23/20

at 21:59;  Status DC


Daptomycin 430 mg/ Sodium Chloride 50 ml @  100 mls/hr Q24H IV  Last admi

nistered on 4/24/20at 15:07;  Start 4/22/20 at 13:00;  Stop 4/25/20 at 13:15;  

Status DC


Sodium Chloride 100 meq/Potassium Chloride 40 meq/ Magnesium Sulfate 20 

meq/Calcium Gluconate 10 meq/ Multivitamins 10 ml/Chromium/ Copper/Manganese/ 

Seleni/Zn 0.5 ml/ Insulin Human Regular 35 unit/ Total Parenteral 

Nutrition/Amino Acids/Dextrose/ Fat Emulsion Intravenous 1,400 ml @  58.333 mls/

hr TPN  CONT IV  Last administered on 4/24/20at 00:06;  Start 4/23/20 at 22:00; 

Stop 4/24/20 at 21:59;  Status DC


Alteplase, Recombinant (Cathflo For Central Catheter Clearance) 1 mg 1X  ONCE 

INT CAT  Last administered on 4/24/20at 11:44;  Start 4/24/20 at 10:45;  Stop 

4/24/20 at 10:46;  Status DC


Ondansetron HCl (Zofran) 4 mg PRN Q6HRS  PRN IV NAUSEA/VOMITING;  Start 4/27/20 

at 07:00;  Stop 4/28/20 at 06:59;  Status DC


Fentanyl Citrate (Fentanyl 2ml Vial) 25 mcg PRN Q5MIN  PRN IV MILD PAIN 1-3;  

Start 4/27/20 at 07:00;  Stop 4/28/20 at 06:59;  Status DC


Fentanyl Citrate (Fentanyl 2ml Vial) 50 mcg PRN Q5MIN  PRN IV MODERATE TO SEVERE

PAIN Last administered on 4/27/20at 10:17;  Start 4/27/20 at 07:00;  Stop 

4/28/20 at 06:59;  Status DC


Ringer's Solution 1,000 ml @  30 mls/hr Q24H IV ;  Start 4/27/20 at 07:00;  Stop

4/27/20 at 18:59;  Status DC


Lidocaine HCl (Xylocaine-Mpf 1% 2ml Vial) 2 ml PRN 1X  PRN ID PRIOR TO IV START;

 Start 4/27/20 at 07:00;  Stop 4/28/20 at 06:59;  Status DC


Prochlorperazine Edisylate (Compazine) 5 mg PACU PRN  PRN IV NAUSEA, MRX1;  

Start 4/27/20 at 07:00;  Stop 4/28/20 at 06:59;  Status DC


Sodium Acetate 50 meq/Potassium Acetate 55 meq/ Magnesium Sulfate 20 meq/Calcium

Gluconate 10 meq/ Multivitamins 10 ml/Chromium/ Copper/Manganese/ Seleni/Zn 0.5 

ml/ Insulin Human Regular 35 unit/ Total Parenteral Nutrition/Amino 

Acids/Dextrose/ Fat Emulsion Intravenous 1,400 ml @  58.333 mls/ hr TPN  CONT IV

;  Start 4/24/20 at 22:00;  Stop 4/24/20 at 14:15;  Status DC


Sodium Acetate 50 meq/Potassium Acetate 55 meq/ Magnesium Sulfate 20 meq/Calcium

Gluconate 10 meq/ Multivitamins 10 ml/Chromium/ Copper/Manganese/ Seleni/Zn 0.5 

ml/ Insulin Human Regular 35 unit/ Total Parenteral Nutrition/Amino 

Acids/Dextrose/ Fat Emulsion Intravenous 1,800 ml @  75 mls/hr TPN  CONT IV  

Last administered on 4/24/20at 22:38;  Start 4/24/20 at 22:00;  Stop 4/25/20 at 

21:59;  Status DC


Sodium Chloride 1,000 ml @  1,000 mls/hr Q1H PRN IV hypotension;  Start 4/24/20 

at 15:31;  Stop 4/24/20 at 21:30;  Status DC


Diphenhydramine HCl (Benadryl) 25 mg 1X PRN  PRN IV ITCHING;  Start 4/24/20 at 

15:45;  Stop 4/25/20 at 15:44;  Status DC


Diphenhydramine HCl (Benadryl) 25 mg 1X PRN  PRN IV ITCHING;  Start 4/24/20 at 

15:45;  Stop 4/25/20 at 15:44;  Status DC


Sodium Chloride 1,000 ml @  400 mls/hr Q2H30M PRN IV PATENCY;  Start 4/24/20 at 

15:31;  Stop 4/25/20 at 03:30;  Status DC


Info (PHARMACY MONITORING -- do not chart) 1 each PRN DAILY  PRN MC SEE 

COMMENTS;  Start 4/24/20 at 15:45;  Stop 5/26/20 at 14:14;  Status DC


Sodium Acetate 50 meq/Potassium Acetate 55 meq/ Magnesium Sulfate 20 meq/Calcium

Gluconate 10 meq/ Multivitamins 10 ml/Chromium/ Copper/Manganese/ Seleni/Zn 0.5 

ml/ Insulin Human Regular 35 unit/ Total Parenteral Nutrition/Amino 

Acids/Dextrose/ Fat Emulsion Intravenous 1,800 ml @  75 mls/hr TPN  CONT IV  

Last administered on 4/25/20at 22:03;  Start 4/25/20 at 22:00;  Stop 4/26/20 at 

21:59;  Status DC


Daptomycin 430 mg/ Sodium Chloride 50 ml @  100 mls/hr Q24H IV  Last 

administered on 4/30/20at 13:00;  Start 4/25/20 at 13:00;  Stop 4/30/20 at 

20:58;  Status DC


Heparin Sodium (Porcine) 1000 unit/Sodium Chloride 1,001 ml @  1,001 mls/hr 1X  

ONCE IRR ;  Start 4/27/20 at 06:00;  Stop 4/27/20 at 06:59;  Status DC


Potassium Acetate 55 meq/Magnesium Sulfate 20 meq/ Calcium Gluconate 10 meq/ 

Multivitamins 10 ml/Chromium/ Copper/Manganese/ Seleni/Zn 0.5 ml/ Insulin Human 

Regular 35 unit/ Total Parenteral Nutrition/Amino Acids/Dextrose/ Fat Emulsion 

Intravenous 1,920 ml @  80 mls/hr TPN  CONT IV  Last administered on 4/26/20at 

22:10;  Start 4/26/20 at 22:00;  Stop 4/27/20 at 21:59;  Status DC


Dexamethasone Sodium Phosphate (Decadron) 4 mg STK-MED ONCE .ROUTE ;  Start 

4/27/20 at 10:56;  Stop 4/27/20 at 10:57;  Status DC


Ondansetron HCl (Zofran) 4 mg STK-MED ONCE .ROUTE ;  Start 4/27/20 at 10:56;  

Stop 4/27/20 at 10:57;  Status DC


Rocuronium Bromide (Zemuron) 50 mg STK-MED ONCE .ROUTE ;  Start 4/27/20 at 

10:56;  Stop 4/27/20 at 10:57;  Status DC


Fentanyl Citrate (Fentanyl 2ml Vial) 100 mcg STK-MED ONCE .ROUTE ;  Start 

4/27/20 at 10:56;  Stop 4/27/20 at 10:57;  Status DC


Bupivacaine HCl/ Epinephrine Bitart (Sensorcain-Epi 0.5%-1:699055 Mpf) 30 ml 

STK-MED ONCE .ROUTE  Last administered on 4/27/20at 12:01;  Start 4/27/20 at 

10:58;  Stop 4/27/20 at 10:58;  Status DC


Cellulose (Surgicel Hemostat 2x14) 1 each STK-MED ONCE .ROUTE ;  Start 4/27/20 

at 10:58;  Stop 4/27/20 at 10:59;  Status DC


Iohexol (Omnipaque 300 Mg/ml) 50 ml STK-MED ONCE .ROUTE ;  Start 4/27/20 at 

10:58;  Stop 4/27/20 at 10:59;  Status DC


Cellulose (Surgicel Hemostat 4x8) 1 each STK-MED ONCE .ROUTE ;  Start 4/27/20 at

10:58;  Stop 4/27/20 at 10:59;  Status DC


Bisacodyl (Dulcolax Supp) 10 mg STK-MED ONCE .ROUTE ;  Start 4/27/20 at 10:59;  

Stop 4/27/20 at 10:59;  Status DC


Heparin Sodium (Porcine) 1000 unit/Sodium Chloride 1,001 ml @  1,001 mls/hr 1X  

ONCE IRR ;  Start 4/27/20 at 12:00;  Stop 4/27/20 at 12:59;  Status DC


Propofol 20 ml @ As Directed STK-MED ONCE IV ;  Start 4/27/20 at 11:05;  Stop 

4/27/20 at 11:05;  Status DC


Sevoflurane (Ultane) 90 ml STK-MED ONCE IH ;  Start 4/27/20 at 11:05;  Stop 

4/27/20 at 11:05;  Status DC


Sevoflurane (Ultane) 60 ml STK-MED ONCE IH ;  Start 4/27/20 at 12:26;  Stop 

4/27/20 at 12:27;  Status DC


Propofol 20 ml @ As Directed STK-MED ONCE IV ;  Start 4/27/20 at 12:26;  Stop 

4/27/20 at 12:27;  Status DC


Phenylephrine HCl (PHENYLEPHRINE in 0.9% NACL PF) 1 mg STK-MED ONCE IV ;  Start 

4/27/20 at 12:34;  Stop 4/27/20 at 12:34;  Status DC


Heparin Sodium (Porcine) (Heparin Sodium) 5,000 unit Q12HR SQ  Last administered

on 5/6/20at 20:57;  Start 4/27/20 at 21:00;  Stop 5/7/20 at 09:59;  Status DC


Sodium Chloride (Normal Saline Flush) 3 ml QSHIFT  PRN IV AFTER MEDS AND BLOOD 

DRAWS;  Start 4/27/20 at 13:45


Naloxone HCl (Narcan) 0.4 mg PRN Q2MIN  PRN IV SEE INSTRUCTIONS Last 

administered on 6/6/20at 15:15;  Start 4/27/20 at 13:45


Sodium Chloride 1,000 ml @  25 mls/hr Q24H IV  Last administered on 5/26/20at 

13:37;  Start 4/27/20 at 13:37;  Stop 5/29/20 at 13:09;  Status DC


Naloxone HCl (Narcan) 0.4 mg PRN Q2MIN  PRN IV SEE INSTRUCTIONS;  Start 4/27/20 

at 14:30;  Status UNV


Sodium Chloride 1,000 ml @  25 mls/hr Q24H IV ;  Start 4/27/20 at 14:30;  Status

UNV


Hydromorphone HCl 30 ml @ 0 mls/hr CONT PRN  PRN IV PER PROTOCOL Last 

administered on 5/2/20at 16:08;  Start 4/27/20 at 14:30;  Stop 5/4/20 at 08:55; 

Status DC


Potassium Acetate 55 meq/Magnesium Sulfate 20 meq/ Calcium Gluconate 10 meq/ 

Multivitamins 10 ml/Chromium/ Copper/Manganese/ Seleni/Zn 0.5 ml/ Insulin Human 

Regular 35 unit/ Total Parenteral Nutrition/Amino Acids/Dextrose/ Fat Emulsion 

Intravenous 1,920 ml @  80 mls/hr TPN  CONT IV  Last administered on 4/27/20at 

22:01;  Start 4/27/20 at 22:00;  Stop 4/28/20 at 21:59;  Status DC


Bumetanide (Bumex) 2 mg BID92 IV  Last administered on 5/1/20at 13:50;  Start 

4/28/20 at 14:00;  Stop 5/2/20 at 14:10;  Status DC


Meropenem 1 gm/ Sodium Chloride 100 ml @  200 mls/hr Q8HRS IV  Last administered

on 5/22/20at 05:53;  Start 4/28/20 at 14:00;  Stop 5/22/20 at 09:31;  Status DC


Potassium Acetate 55 meq/Magnesium Sulfate 20 meq/ Calcium Gluconate 10 meq/ 

Multivitamins 10 ml/Chromium/ Copper/Manganese/ Seleni/Zn 0.5 ml/ Insulin Human 

Regular 35 unit/ Total Parenteral Nutrition/Amino Acids/Dextrose/ Fat Emulsion 

Intravenous 1,920 ml @  80 mls/hr TPN  CONT IV  Last administered on 4/28/20at 

22:02;  Start 4/28/20 at 22:00;  Stop 4/29/20 at 21:59;  Status DC


Hydromorphone HCl (Dilaudid Standard PCA) 12 mg STK-MED ONCE IV ;  Start 4/27/20

at 14:35;  Stop 4/28/20 at 13:53;  Status DC


Artificial Tears (Artificial Tears) 1 drop PRN Q15MIN  PRN OU DRY EYE Last 

administered on 6/15/20at 03:38;  Start 4/29/20 at 05:30


Hydromorphone HCl (Dilaudid Standard PCA) 12 mg STK-MED ONCE IV ;  Start 4/28/20

at 12:05;  Stop 4/29/20 at 09:15;  Status DC


Potassium Acetate 65 meq/Magnesium Sulfate 20 meq/ Calcium Gluconate 10 meq/ 

Multivitamins 10 ml/Chromium/ Copper/Manganese/ Seleni/Zn 0.5 ml/ Insulin Human 

Regular 30 unit/ Total Parenteral Nutrition/Amino Acids/Dextrose/ Fat Emulsion 

Intravenous 1,920 ml @  80 mls/hr TPN  CONT IV  Last administered on 4/29/20at 

22:22;  Start 4/29/20 at 22:00;  Stop 4/30/20 at 21:59;  Status DC


Cyclobenzaprine HCl (Flexeril) 10 mg PRN Q6HRS  PRN PO MUSCLE SPASMS;  Start 

4/30/20 at 10:45


Potassium Acetate 55 meq/Magnesium Sulfate 20 meq/ Calcium Gluconate 10 meq/ 

Multivitamins 10 ml/Chromium/ Copper/Manganese/ Seleni/Zn 0.5 ml/ Insulin Human 

Regular 30 unit/ Total Parenteral Nutrition/Amino Acids/Dextrose/ Fat Emulsion 

Intravenous 1,920 ml @  80 mls/hr TPN  CONT IV  Last administered on 5/1/20at 

01:00;  Start 4/30/20 at 22:00;  Stop 5/1/20 at 21:59;  Status DC


Magnesium Sulfate 50 ml @ 25 mls/hr 1X  ONCE IV  Last administered on 4/30/20at 

17:18;  Start 4/30/20 at 12:45;  Stop 4/30/20 at 14:44;  Status DC


Potassium Chloride/Water 100 ml @  100 mls/hr 1X  ONCE IV  Last administered on 

5/1/20at 11:27;  Start 5/1/20 at 12:00;  Stop 5/1/20 at 12:59;  Status DC


Hydromorphone HCl (Dilaudid Standard PCA) 12 mg STK-MED ONCE IV ;  Start 4/29/20

at 10:50;  Stop 5/1/20 at 11:02;  Status DC


Hydromorphone HCl (Dilaudid Standard PCA) 12 mg STK-MED ONCE IV ;  Start 4/30/20

at 13:47;  Stop 5/1/20 at 11:03;  Status DC


Potassium Acetate 30 meq/Magnesium Sulfate 20 meq/ Calcium Gluconate 10 meq/ 

Multivitamins 10 ml/Chromium/ Copper/Manganese/ Seleni/Zn 0.5 ml/ Insulin Human 

Regular 30 unit/ Potassium Chloride 30 meq/ Total Parenteral Nutrition/Amino 

Acids/Dextrose/ Fat Emulsion Intravenous 1,920 ml @  80 mls/hr TPN  CONT IV  

Last administered on 5/1/20at 22:34;  Start 5/1/20 at 22:00;  Stop 5/2/20 at 

21:59;  Status DC


Potassium Chloride/Water 100 ml @  100 mls/hr Q1H IV  Last administered on 

5/2/20at 13:05;  Start 5/2/20 at 07:00;  Stop 5/2/20 at 10:59;  Status DC


Magnesium Sulfate 50 ml @ 25 mls/hr 1X  ONCE IV  Last administered on 5/2/20at 

10:34;  Start 5/2/20 at 10:30;  Stop 5/2/20 at 12:29;  Status DC


Potassium Chloride 75 meq/ Magnesium Sulfate 20 meq/Calcium Gluconate 10 meq/ 

Multivitamins 10 ml/Chromium/ Copper/Manganese/ Seleni/Zn 0.5 ml/ Insulin Human 

Regular 30 unit/ Total Parenteral Nutrition/Amino Acids/Dextrose/ Fat Emulsion 

Intravenous 1,920 ml @  80 mls/hr TPN  CONT IV  Last administered on 5/2/20at 

21:51;  Start 5/2/20 at 22:00;  Stop 5/3/20 at 22:00;  Status DC


Potassium Chloride 75 meq/ Magnesium Sulfate 20 meq/Calcium Gluconate 10 meq/ 

Multivitamins 10 ml/Chromium/ Copper/Manganese/ Seleni/Zn 0.5 ml/ Insulin Human 

Regular 25 unit/ Total Parenteral Nutrition/Amino Acids/Dextrose/ Fat Emulsion 

Intravenous 1,920 ml @  80 mls/hr TPN  CONT IV  Last administered on 5/3/20at 

22:04;  Start 5/3/20 at 22:00;  Stop 5/4/20 at 21:59;  Status DC


Hydromorphone HCl (Dilaudid) 0.4 mg PRN Q4HRS  PRN IVP PAIN Last administered on

5/4/20at 10:57;  Start 5/4/20 at 09:00;  Stop 5/4/20 at 18:59;  Status DC


Micafungin Sodium 100 mg/Dextrose 100 ml @  100 mls/hr Q24H IV  Last 

administered on 5/26/20at 12:17;  Start 5/4/20 at 11:00;  Stop 5/27/20 at 09:59;

 Status DC


Daptomycin 485 mg/ Sodium Chloride 50 ml @  100 mls/hr Q24H IV  Last 

administered on 5/11/20at 13:10;  Start 5/4/20 at 11:00;  Stop 5/12/20 at 07:44;

 Status DC


Potassium Chloride 75 meq/ Magnesium Sulfate 15 meq/Calcium Gluconate 8 meq/ 

Multivitamins 10 ml/Chromium/ Copper/Manganese/ Seleni/Zn 0.5 ml/ Insulin Human 

Regular 25 unit/ Total Parenteral Nutrition/Amino Acids/Dextrose/ Fat Emulsion 

Intravenous 1,920 ml @  80 mls/hr TPN  CONT IV  Last administered on 5/4/20at 

23:08;  Start 5/4/20 at 22:00;  Stop 5/5/20 at 21:59;  Status DC


Haloperidol Lactate (Haldol Inj) 3 mg 1X  ONCE IVP  Last administered on 

5/4/20at 14:37;  Start 5/4/20 at 14:30;  Stop 5/4/20 at 14:31;  Status DC


Hydromorphone HCl (Dilaudid) 1 mg PRN Q4HRS  PRN IVP PAIN Last administered on 

5/18/20at 06:25;  Start 5/4/20 at 19:00;  Stop 5/18/20 at 17:10;  Status DC


Potassium Chloride 75 meq/ Magnesium Sulfate 15 meq/Calcium Gluconate 8 meq/ 

Multivitamins 10 ml/Chromium/ Copper/Manganese/ Seleni/Zn 0.5 ml/ Insulin Human 

Regular 20 unit/ Total Parenteral Nutrition/Amino Acids/Dextrose/ Fat Emulsion 

Intravenous 1,920 ml @  80 mls/hr TPN  CONT IV  Last administered on 5/5/20at 

22:10;  Start 5/5/20 at 22:00;  Stop 5/6/20 at 21:59;  Status DC


Lidocaine HCl (Buffered Lidocaine 1%) 3 ml STK-MED ONCE .ROUTE ;  Start 5/6/20 

at 11:31;  Stop 5/6/20 at 11:31;  Status DC


Lidocaine HCl (Buffered Lidocaine 1%) 3 ml STK-MED ONCE .ROUTE ;  Start 5/6/20 

at 12:28;  Stop 5/6/20 at 12:29;  Status DC


Lidocaine HCl (Buffered Lidocaine 1%) 6 ml 1X  ONCE INJ  Last administered on 

5/6/20at 12:53;  Start 5/6/20 at 12:45;  Stop 5/6/20 at 12:46;  Status DC


Potassium Chloride 75 meq/ Magnesium Sulfate 15 meq/Calcium Gluconate 8 meq/ 

Multivitamins 10 ml/Chromium/ Copper/Manganese/ Seleni/Zn 0.5 ml/ Insulin Human 

Regular 20 unit/ Total Parenteral Nutrition/Amino Acids/Dextrose/ Fat Emulsion 

Intravenous 1,920 ml @  80 mls/hr TPN  CONT IV  Last administered on 5/6/20at 

22:00;  Start 5/6/20 at 22:00;  Stop 5/7/20 at 21:59;  Status DC


Potassium Chloride 75 meq/ Magnesium Sulfate 15 meq/Calcium Gluconate 8 meq/ 

Multivitamins 10 ml/Chromium/ Copper/Manganese/ Seleni/Zn 0.5 ml/ Insulin Human 

Regular 15 unit/ Total Parenteral Nutrition/Amino Acids/Dextrose/ Fat Emulsion 

Intravenous 1,920 ml @  80 mls/hr TPN  CONT IV  Last administered on 5/7/20at 

22:28;  Start 5/7/20 at 22:00;  Stop 5/8/20 at 21:59;  Status DC


Vecuronium Bromide (Norcuron Bolus) 6 mg PRN Q6HRS  PRN IV VENT ASYNCHRONY;  

Start 5/7/20 at 19:15;  Stop 5/7/20 at 19:35;  Status DC


Bumetanide (Bumex) 2 mg 1X  ONCE IV  Last administered on 5/7/20at 22:09;  Start

5/7/20 at 19:45;  Stop 5/7/20 at 19:46;  Status DC


Lidocaine HCl (Buffered Lidocaine 1%) 3 ml STK-MED ONCE .ROUTE ;  Start 5/8/20 

at 07:59;  Stop 5/8/20 at 07:59;  Status DC


Midazolam HCl (Versed) 5 mg STK-MED ONCE .ROUTE ;  Start 5/8/20 at 08:36;  Stop 

5/8/20 at 08:36;  Status DC


Fentanyl Citrate (Fentanyl 5ml Vial) 250 mcg STK-MED ONCE .ROUTE ;  Start 5/8/20

at 08:36;  Stop 5/8/20 at 08:37;  Status DC


Lidocaine HCl (Buffered Lidocaine 1%) 3 ml 1X  ONCE IJ  Last administered on 

5/8/20at 09:30;  Start 5/8/20 at 09:15;  Stop 5/8/20 at 09:16;  Status DC


Midazolam HCl (Versed) 5 mg 1X  ONCE IV  Last administered on 5/8/20at 09:30;  

Start 5/8/20 at 09:15;  Stop 5/8/20 at 09:16;  Status DC


Fentanyl Citrate (Fentanyl 5ml Vial) 250 mcg 1X  ONCE IV  Last administered on 

5/8/20at 09:30;  Start 5/8/20 at 09:15;  Stop 5/8/20 at 09:16;  Status DC


Bumetanide (Bumex) 2 mg DAILY IV  Last administered on 5/18/20at 08:07;  Start 

5/8/20 at 10:00;  Stop 5/18/20 at 17:15;  Status DC


Potassium Chloride 75 meq/ Magnesium Sulfate 15 meq/ Multivitamins 10 

ml/Chromium/ Copper/Manganese/ Seleni/Zn 0.5 ml/ Insulin Human Regular 15 unit/ 

Total Parenteral Nutrition/Amino Acids/Dextrose/ Fat Emulsion Intravenous 1,920 

ml @  80 mls/hr TPN  CONT IV  Last administered on 5/8/20at 21:59;  Start 5/8/20

at 22:00;  Stop 5/9/20 at 21:59;  Status DC


Metoclopramide HCl (Reglan Vial) 10 mg PRN Q3HRS  PRN IVP NAUSEA/VOMITING-3rd 

choice Last administered on 5/14/20at 04:25;  Start 5/9/20 at 16:45


Potassium Chloride 75 meq/ Magnesium Sulfate 15 meq/ Multivitamins 10 

ml/Chromium/ Copper/Manganese/ Seleni/Zn 0.5 ml/ Insulin Human Regular 15 unit/ 

Total Parenteral Nutrition/Amino Acids/Dextrose/ Fat Emulsion Intravenous 1,920 

ml @  80 mls/hr TPN  CONT IV  Last administered on 5/9/20at 22:41;  Start 5/9/20

at 22:00;  Stop 5/10/20 at 21:59;  Status DC


Magnesium Sulfate 50 ml @ 25 mls/hr 1X  ONCE IV  Last administered on 5/10/20at 

10:44;  Start 5/10/20 at 09:00;  Stop 5/10/20 at 10:59;  Status DC


Potassium Chloride/Water 100 ml @  100 mls/hr 1X  ONCE IV  Last administered on 

5/10/20at 09:37;  Start 5/10/20 at 09:00;  Stop 5/10/20 at 09:59;  Status DC


Duloxetine HCl (Cymbalta) 30 mg DAILY PO  Last administered on 5/11/20at 09:48; 

Start 5/10/20 at 14:00;  Stop 5/13/20 at 10:25;  Status DC


Potassium Chloride 80 meq/ Magnesium Sulfate 20 meq/ Multivitamins 10 

ml/Chromium/ Copper/Manganese/ Seleni/Zn 0.5 ml/ Insulin Human Regular 15 unit/ 

Total Parenteral Nutrition/Amino Acids/Dextrose/ Fat Emulsion Intravenous 1,920 

ml @  80 mls/hr TPN  CONT IV  Last administered on 5/10/20at 21:42;  Start 

5/10/20 at 22:00;  Stop 5/11/20 at 21:59;  Status DC


Potassium Chloride 80 meq/ Magnesium Sulfate 20 meq/ Multivitamins 10 

ml/Chromium/ Copper/Manganese/ Seleni/Zn 0.5 ml/ Insulin Human Regular 15 unit/ 

Total Parenteral Nutrition/Amino Acids/Dextrose/ Fat Emulsion Intravenous 1,920 

ml @  80 mls/hr TPN  CONT IV  Last administered on 5/11/20at 22:20;  Start 

5/11/20 at 22:00;  Stop 5/12/20 at 21:59;  Status DC


Lidocaine HCl (Buffered Lidocaine 1%) 3 ml STK-MED ONCE .ROUTE ;  Start 5/12/20 

at 09:54;  Stop 5/12/20 at 09:55;  Status DC


Hydromorphone HCl (Dilaudid Standard PCA) 12 mg STK-MED ONCE IV ;  Start 5/1/20 

at 15:50;  Stop 5/12/20 at 11:24;  Status DC


Potassium Chloride 80 meq/ Magnesium Sulfate 20 meq/ Multivitamins 10 

ml/Chromium/ Copper/Manganese/ Seleni/Zn 0.5 ml/ Insulin Human Regular 15 unit/ 

Total Parenteral Nutrition/Amino Acids/Dextrose/ Fat Emulsion Intravenous 1,920 

ml @  80 mls/hr TPN  CONT IV  Last administered on 5/12/20at 21:40;  Start 

5/12/20 at 22:00;  Stop 5/13/20 at 21:59;  Status DC


Lidocaine HCl (Buffered Lidocaine 1%) 6 ml 1X  ONCE INJ  Last administered on 

5/12/20at 14:15;  Start 5/12/20 at 14:15;  Stop 5/12/20 at 14:16;  Status DC


Potassium Chloride 80 meq/ Magnesium Sulfate 20 meq/ Multivitamins 10 

ml/Chromium/ Copper/Manganese/ Seleni/Zn 1 ml/ Insulin Human Regular 15 unit/ 

Total Parenteral Nutrition/Amino Acids/Dextrose/ Fat Emulsion Intravenous 1,920 

ml @  80 mls/hr TPN  CONT IV  Last administered on 5/13/20at 22:04;  Start 

5/13/20 at 22:00;  Stop 5/14/20 at 21:59;  Status DC


Potassium Chloride/Water 100 ml @  100 mls/hr 1X  ONCE IV  Last administered on 

5/14/20at 11:34;  Start 5/14/20 at 11:00;  Stop 5/14/20 at 11:59;  Status DC


Potassium Chloride 90 meq/ Magnesium Sulfate 20 meq/ Multivitamins 10 

ml/Chromium/ Copper/Manganese/ Seleni/Zn 1 ml/ Insulin Human Regular 15 unit/ 

Total Parenteral Nutrition/Amino Acids/Dextrose/ Fat Emulsion Intravenous 1,920 

ml @  80 mls/hr TPN  CONT IV  Last administered on 5/14/20at 22:57;  Start 

5/14/20 at 22:00;  Stop 5/15/20 at 21:59;  Status DC


Potassium Chloride 90 meq/ Magnesium Sulfate 20 meq/ Multivitamins 10 

ml/Chromium/ Copper/Manganese/ Seleni/Zn 1 ml/ Insulin Human Regular 15 unit/ 

Total Parenteral Nutrition/Amino Acids/Dextrose/ Fat Emulsion Intravenous 1,920 

ml @  80 mls/hr TPN  CONT IV  Last administered on 5/15/20at 22:48;  Start 

5/15/20 at 22:00;  Stop 5/16/20 at 21:59;  Status DC


Potassium Chloride 90 meq/ Magnesium Sulfate 20 meq/ Multivitamins 10 

ml/Chromium/ Copper/Manganese/ Seleni/Zn 1 ml/ Insulin Human Regular 15 unit/ 

Total Parenteral Nutrition/Amino Acids/Dextrose/ Fat Emulsion Intravenous 1,890 

ml @  78.75 mls/ hr TPN  CONT IV  Last administered on 5/16/20at 22:15;  Start 

5/16/20 at 22:00;  Stop 5/17/20 at 21:59;  Status DC


Linezolid/Dextrose 300 ml @  300 mls/hr Q12HR IV  Last administered on 5/19/20at

21:08;  Start 5/17/20 at 09:00;  Stop 5/20/20 at 08:11;  Status DC


Daptomycin 450 mg/ Sodium Chloride 50 ml @  100 mls/hr Q24H IV  Last 

administered on 5/20/20at 09:25;  Start 5/17/20 at 09:00;  Stop 5/21/20 at 

08:30;  Status DC


Potassium Chloride 90 meq/ Magnesium Sulfate 20 meq/ Multivitamins 10 

ml/Chromium/ Copper/Manganese/ Seleni/Zn 1 ml/ Insulin Human Regular 15 unit/ 

Total Parenteral Nutrition/Amino Acids/Dextrose/ Fat Emulsion Intravenous 1,890 

ml @  78.75 mls/ hr TPN  CONT IV  Last administered on 5/17/20at 21:34;  Start 

5/17/20 at 22:00;  Stop 5/18/20 at 21:59;  Status DC


Lorazepam (Ativan Inj) 2 mg STK-MED ONCE .ROUTE ;  Start 5/17/20 at 14:58;  Stop

5/17/20 at 14:58;  Status DC


Metoprolol Tartrate (Lopressor Vial) 5 mg 1X  ONCE IVP  Last administered on 

5/17/20at 15:31;  Start 5/17/20 at 15:15;  Stop 5/17/20 at 15:16;  Status DC


Lorazepam (Ativan Inj) 2 mg 1X  ONCE IVP  Last administered on 5/17/20at 15:30; 

Start 5/17/20 at 15:15;  Stop 5/17/20 at 15:16;  Status DC


Enoxaparin Sodium (Lovenox 40mg Syringe) 40 mg Q24H SQ  Last administered on 

6/5/20at 17:44;  Start 5/17/20 at 17:00;  Stop 6/7/20 at 06:50;  Status DC


Lorazepam (Ativan Inj) 1 mg PRN Q4HRS  PRN IVP ANXIETY / AGITATION MILD-MOD Last

administered on 5/31/20at 15:55;  Start 5/17/20 at 19:15;  Stop 6/2/20 at 11:45;

 Status DC


Lorazepam (Ativan Inj) 2 mg PRN Q4HRS  PRN IVP ANXIETY / AGITATION SEVERE Last 

administered on 6/1/20at 07:55;  Start 5/17/20 at 19:15;  Stop 6/2/20 at 11:45; 

Status DC


Fentanyl Citrate (Fentanyl 2ml Vial) 50 mcg PRN Q4HRS  PRN IVP SEVERE PAIN Last 

administered on 6/13/20at 05:15;  Start 5/18/20 at 13:15;  Stop 6/14/20 at 

09:29;  Status DC


Fentanyl Citrate (Fentanyl 2ml Vial) 25 mcg PRN Q4HRS  PRN IVP MODERATE PAIN 

Last administered on 6/13/20at 00:27;  Start 5/18/20 at 13:15;  Stop 6/14/20 at 

09:30;  Status DC


Potassium Chloride 90 meq/ Magnesium Sulfate 20 meq/ Multivitamins 10 

ml/Chromium/ Copper/Manganese/ Seleni/Zn 1 ml/ Insulin Human Regular 15 unit/ 

Total Parenteral Nutrition/Amino Acids/Dextrose/ Fat Emulsion Intravenous 1,890 

ml @  78.75 mls/ hr TPN  CONT IV  Last administered on 5/18/20at 22:18;  Start 

5/18/20 at 22:00;  Stop 5/19/20 at 21:59;  Status DC


Furosemide (Lasix) 40 mg 1X  ONCE IVP  Last administered on 5/18/20at 21:51;  

Start 5/18/20 at 21:45;  Stop 5/18/20 at 21:48;  Status DC


Albumin Human 100 ml @  100 mls/hr 1X PRN  PRN IV SEE COMMENTS;  Start 5/19/20 

at 01:30


Furosemide (Lasix) 40 mg [id number] IVP  Last administered on 6/3/20at 08:04;  Start 

5/19/20 at 14:00;  Stop 6/3/20 at 13:07;  Status DC


Potassium Chloride 90 meq/ Magnesium Sulfate 20 meq/ Multivitamins 10 

ml/Chromium/ Copper/Manganese/ Seleni/Zn 1 ml/ Insulin Human Regular 15 unit/ 

Total Parenteral Nutrition/Amino Acids/Dextrose/ Fat Emulsion Intravenous 1,800 

ml @  75 mls/hr TPN  CONT IV  Last administered on 5/19/20at 22:31;  Start 

5/19/20 at 22:00;  Stop 5/20/20 at 21:59;  Status DC


Potassium Chloride 90 meq/ Magnesium Sulfate 20 meq/ Multivitamins 10 ml/Ch

romium/ Copper/Manganese/ Seleni/Zn 1 ml/ Insulin Human Regular 15 unit/ Total 

Parenteral Nutrition/Amino Acids/Dextrose/ Fat Emulsion Intravenous 1,800 ml @  

75 mls/hr TPN  CONT IV  Last administered on 5/20/20at 22:28;  Start 5/20/20 at 

22:00;  Stop 5/21/20 at 21:59;  Status DC


Potassium Chloride 110 meq/ Magnesium Sulfate 20 meq/ Multivitamins 10 

ml/Chromium/ Copper/Manganese/ Seleni/Zn 1 ml/ Insulin Human Regular 15 unit/ 

Total Parenteral Nutrition/Amino Acids/Dextrose/ Fat Emulsion Intravenous 1,800 

ml @  75 mls/hr TPN  CONT IV  Last administered on 5/21/20at 22:01;  Start 

5/21/20 at 22:00;  Stop 5/22/20 at 21:59;  Status DC


Saliva Substitute (Biotene Moisturizing Mouth) 2 spray PRN Q15MIN  PRN PO DRY 

MOUTH;  Start 5/21/20 at 11:00


Potassium Chloride 110 meq/ Magnesium Sulfate 20 meq/ Multivitamins 10 

ml/Chromium/ Copper/Manganese/ Seleni/Zn 1 ml/ Insulin Human Regular 15 unit/ 

Total Parenteral Nutrition/Amino Acids/Dextrose/ Fat Emulsion Intravenous 1,800 

ml @  75 mls/hr TPN  CONT IV  Last administered on 5/22/20at 22:21;  Start 

5/22/20 at 22:00;  Stop 5/23/20 at 21:59;  Status DC


Potassium Chloride 110 meq/ Magnesium Sulfate 20 meq/ Multivitamins 10 

ml/Chromium/ Copper/Manganese/ Seleni/Zn 1 ml/ Insulin Human Regular 15 unit/ 

Total Parenteral Nutrition/Amino Acids/Dextrose/ Fat Emulsion Intravenous 1,800 

ml @  75 mls/hr TPN  CONT IV  Last administered on 5/23/20at 22:04;  Start 

5/23/20 at 22:00;  Stop 5/24/20 at 21:59;  Status DC


Potassium Chloride 110 meq/ Magnesium Sulfate 20 meq/ Multivitamins 10 ml/C

hromium/ Copper/Manganese/ Seleni/Zn 1 ml/ Insulin Human Regular 15 unit/ Total 

Parenteral Nutrition/Amino Acids/Dextrose/ Fat Emulsion Intravenous 1,800 ml @  

75 mls/hr TPN  CONT IV  Last administered on 5/24/20at 22:48;  Start 5/24/20 at 

22:00;  Stop 5/25/20 at 21:59;  Status DC


Potassium Chloride 70 meq/ Magnesium Sulfate 20 meq/ Multivitamins 10 

ml/Chromium/ Copper/Manganese/ Seleni/Zn 1 ml/ Insulin Human Regular 15 unit/ 

Total Parenteral Nutrition/Amino Acids/Dextrose/ Fat Emulsion Intravenous 1,800 

ml @  75 mls/hr TPN  CONT IV  Last administered on 5/25/20at 21:39;  Start 

5/25/20 at 22:00;  Stop 5/26/20 at 21:59;  Status DC


Meropenem 500 mg/ Sodium Chloride 50 ml @  100 mls/hr Q6HRS IV  Last 

administered on 5/27/20at 06:02;  Start 5/25/20 at 18:00;  Stop 5/27/20 at 

09:59;  Status DC


Barium Sulfate (Varibar Thin Liquid Apple) 148 gm 1X  ONCE PO ;  Start 5/26/20 

at 11:45;  Stop 5/26/20 at 11:49;  Status DC


Potassium Chloride 70 meq/ Magnesium Sulfate 20 meq/ Multivitamins 10 

ml/Chromium/ Copper/Manganese/ Seleni/Zn 1 ml/ Insulin Human Regular 15 unit/ 

Total Parenteral Nutrition/Amino Acids/Dextrose/ Fat Emulsion Intravenous 1,800 

ml @  75 mls/hr TPN  CONT IV  Last administered on 5/26/20at 22:27;  Start 

5/26/20 at 22:00;  Stop 5/27/20 at 21:59;  Status DC


Piperacillin Sod/ Tazobactam Sod 3.375 gm/Sodium Chloride 50 ml @  100 mls/hr 

Q6HRS IV  Last administered on 6/4/20at 06:10;  Start 5/27/20 at 12:00;  Stop 

6/4/20 at 07:26;  Status DC


Potassium Chloride 70 meq/ Magnesium Sulfate 20 meq/ Multivitamins 10 ml

/Chromium/ Copper/Manganese/ Seleni/Zn 1 ml/ Insulin Human Regular 15 unit/ 

Total Parenteral Nutrition/Amino Acids/Dextrose/ Fat Emulsion Intravenous 1,800 

ml @  75 mls/hr TPN  CONT IV  Last administered on 5/27/20at 22:03;  Start 

5/27/20 at 22:00;  Stop 5/28/20 at 21:59;  Status DC


Potassium Chloride 70 meq/ Magnesium Sulfate 20 meq/ Multivitamins 10 

ml/Chromium/ Copper/Manganese/ Seleni/Zn 1 ml/ Insulin Human Regular 15 unit/ 

Total Parenteral Nutrition/Amino Acids/Dextrose/ Fat Emulsion Intravenous 1,800 

ml @  75 mls/hr TPN  CONT IV  Last administered on 5/28/20at 22:33;  Start 

5/28/20 at 22:00;  Stop 5/29/20 at 21:59;  Status DC


Potassium Chloride 70 meq/ Magnesium Sulfate 20 meq/ Multivitamins 10 

ml/Chromium/ Copper/Manganese/ Seleni/Zn 1 ml/ Insulin Human Regular 15 unit/ 

Total Parenteral Nutrition/Amino Acids/Dextrose/ Fat Emulsion Intravenous 1,800 

ml @  75 mls/hr TPN  CONT IV  Last administered on 5/29/20at 23:13;  Start 

5/29/20 at 22:00;  Stop 5/30/20 at 21:59;  Status DC


Potassium Chloride 80 meq/ Magnesium Sulfate 20 meq/ Multivitamins 10 

ml/Chromium/ Copper/Manganese/ Seleni/Zn 1 ml/ Insulin Human Regular 15 unit/ 

Total Parenteral Nutrition/Amino Acids/Dextrose/ Fat Emulsion Intravenous 1,800 

ml @  75 mls/hr TPN  CONT IV  Last administered on 5/30/20at 22:30;  Start 

5/30/20 at 22:00;  Stop 5/31/20 at 21:59;  Status DC


Potassium Chloride 80 meq/ Magnesium Sulfate 20 meq/ Multivitamins 10 

ml/Chromium/ Copper/Manganese/ Seleni/Zn 1 ml/ Insulin Human Regular 15 unit/ 

Total Parenteral Nutrition/Amino Acids/Dextrose/ Fat Emulsion Intravenous 1,800 

ml @  75 mls/hr TPN  CONT IV  Last administered on 5/31/20at 21:54;  Start 

5/31/20 at 22:00;  Stop 6/1/20 at 21:59;  Status DC


Potassium Chloride/Water 100 ml @  100 mls/hr 1X  ONCE IV  Last administered on 

6/1/20at 10:15;  Start 6/1/20 at 10:00;  Stop 6/1/20 at 10:59;  Status DC


Potassium Chloride 90 meq/ Magnesium Sulfate 20 meq/ Multivitamins 10 

ml/Chromium/ Copper/Manganese/ Seleni/Zn 1 ml/ Insulin Human Regular 20 unit/ 

Total Parenteral Nutrition/Amino Acids/Dextrose/ Fat Emulsion Intravenous 1,800 

ml @  75 mls/hr TPN  CONT IV  Last administered on 6/1/20at 22:28;  Start 6/1/20

at 22:00;  Stop 6/2/20 at 21:59;  Status DC


Potassium Chloride 90 meq/ Magnesium Sulfate 20 meq/ Multivitamins 10 

ml/Chromium/ Copper/Manganese/ Seleni/Zn 1 ml/ Insulin Human Regular 20 unit/ 

Total Parenteral Nutrition/Amino Acids/Dextrose/ Fat Emulsion Intravenous 1,800 

ml @  75 mls/hr TPN  CONT IV  Last administered on 6/2/20at 22:08;  Start 6/2/20

at 22:00;  Stop 6/3/20 at 21:59;  Status DC


Lorazepam (Ativan Inj) 0.25 mg PRN Q4HRS  PRN IVP ANXIETY / AGITATION Last 

administered on 6/13/20at 02:25;  Start 6/3/20 at 07:30


Potassium Chloride 90 meq/ Magnesium Sulfate 20 meq/ Multivitamins 10 

ml/Chromium/ Copper/Manganese/ Seleni/Zn 1 ml/ Insulin Human Regular 20 unit/ 

Total Parenteral Nutrition/Amino Acids/Dextrose/ Fat Emulsion Intravenous 1,800 

ml @  75 mls/hr TPN  CONT IV  Last administered on 6/3/20at 23:13;  Start 6/3/20

at 22:00;  Stop 6/4/20 at 21:59;  Status DC


Furosemide (Lasix) 40 mg DAILY IVP  Last administered on 6/5/20at 11:14;  Start 

6/3/20 at 13:30;  Stop 6/7/20 at 09:12;  Status DC


Fluoxetine HCl (PROzac) 20 mg QHS PEG  Last administered on 6/15/20at 21:53;  

Start 6/4/20 at 21:00


Fentanyl (Duragesic 50mcg/ Hr Patch) 1 patch Q72H TD  Last administered on 

6/4/20at 21:22;  Start 6/4/20 at 21:00;  Stop 6/13/20 at 12:00;  Status DC


Potassium Chloride 40 meq/ Potassium Acetate 60 meq/Magnesium Sulfate 10 meq/ 

Multivitamins 10 ml/Chromium/ Copper/Manganese/ Seleni/Zn 1 ml/ Insulin Human 

Regular 20 unit/ Total Parenteral Nutrition/Amino Acids/Dextrose/ Fat Emulsion 

Intravenous 1,800 ml @  75 mls/hr TPN  CONT IV  Last administered on 6/5/20at 

00:03;  Start 6/4/20 at 22:00;  Stop 6/5/20 at 21:59;  Status DC


Potassium Acetate 80 meq/Magnesium Sulfate 5 meq/ Multivitamins 10 ml/Chromium/ 

Copper/Manganese/ Seleni/Zn 1 ml/ Insulin Human Regular 20 unit/ Total Lisa

ral Nutrition/Amino Acids/Dextrose/ Fat Emulsion Intravenous 1,920 ml @  80 

mls/hr TPN  CONT IV  Last administered on 6/5/20at 21:59;  Start 6/5/20 at 

22:00;  Stop 6/6/20 at 21:59;  Status DC


Potassium Acetate 60 meq/Magnesium Sulfate 5 meq/ Multivitamins 10 ml/Chromium/ 

Copper/Manganese/ Seleni/Zn 1 ml/ Insulin Human Regular 30 unit/ Total 

Parenteral Nutrition/Amino Acids/Dextrose/ Fat Emulsion Intravenous 1,920 ml @  

80 mls/hr TPN  CONT IV  Last administered on 6/6/20at 21:54;  Start 6/6/20 at 

22:00;  Stop 6/7/20 at 21:59;  Status DC


Norepinephrine Bitartrate 8 mg/ Dextrose 258 ml @  13.332 mls/ hr CONT  PRN IV 

PER PROTOCOL Last administered on 6/7/20at 21:46;  Start 6/7/20 at 06:30


Albumin Human 500 ml @  125 mls/hr 1X  ONCE IV  Last administered on 6/7/20at 

08:10;  Start 6/7/20 at 08:15;  Stop 6/7/20 at 12:14;  Status DC


Potassium Acetate 40 meq/Magnesium Sulfate 5 meq/ Multivitamins 10 ml/Chromium/ 

Copper/Manganese/ Seleni/Zn 1 ml/ Insulin Human Regular 30 unit/ Total 

Parenteral Nutrition/Amino Acids/Dextrose/ Fat Emulsion Intravenous 1,920 ml @  

80 mls/hr TPN  CONT IV  Last administered on 6/7/20at 22:23;  Start 6/7/20 at 

22:00;  Stop 6/8/20 at 21:59;  Status DC


Meropenem 1 gm/ Sodium Chloride 100 ml @  200 mls/hr Q8HRS IV ;  Start 6/7/20 at

14:00;  Status Cancel


Meropenem 1 gm/ Sodium Chloride 100 ml @  200 mls/hr Q8HRS IV  Last administered

on 6/7/20at 11:04;  Start 6/7/20 at 10:00;  Stop 6/7/20 at 13:00;  Status DC


Meropenem 1 gm/ Sodium Chloride 100 ml @  200 mls/hr Q12HR IV  Last administered

on 6/16/20at 09:27;  Start 6/7/20 at 21:00


Sodium Chloride 1,000 ml @  1,000 mls/hr 1X  ONCE IV  Last administered on 

6/7/20at 11:06;  Start 6/7/20 at 10:45;  Stop 6/7/20 at 11:44;  Status DC


Micafungin Sodium 100 mg/Dextrose 100 ml @  100 mls/hr Q24H IV  Last ad

ministered on 6/15/20at 10:36;  Start 6/7/20 at 11:00


Daptomycin 410 mg/ Sodium Chloride 50 ml @  100 mls/hr Q24H IV  Last admin

istered on 6/9/20at 13:33;  Start 6/7/20 at 14:00;  Stop 6/10/20 at 08:30;  

Status DC


Midazolam HCl (Versed) 2 mg STK-MED ONCE .ROUTE ;  Start 6/7/20 at 14:47;  Stop 

6/7/20 at 14:48;  Status DC


Fentanyl Citrate (Fentanyl 2ml Vial) 100 mcg STK-MED ONCE .ROUTE ;  Start 6/7/20

at 14:47;  Stop 6/7/20 at 14:48;  Status DC


Flumazenil (Romazicon) 0.5 mg STK-MED ONCE IV ;  Start 6/7/20 at 14:48;  Stop 

6/7/20 at 14:48;  Status DC


Naloxone HCl (Narcan) 0.4 mg STK-MED ONCE .ROUTE ;  Start 6/7/20 at 14:48;  Stop

6/7/20 at 14:48;  Status DC


Lidocaine HCl (Lidocaine 1% 20ml Vial) 20 ml STK-MED ONCE .ROUTE ;  Start 6/7/20

at 14:48;  Stop 6/7/20 at 14:48;  Status DC


Midazolam HCl (Versed) 2 mg 1X  ONCE IV  Last administered on 6/7/20at 15:28;  

Start 6/7/20 at 15:00;  Stop 6/7/20 at 15:01;  Status DC


Fentanyl Citrate (Fentanyl 2ml Vial) 100 mcg 1X  ONCE IV  Last administered on 

6/7/20at 15:28;  Start 6/7/20 at 15:00;  Stop 6/7/20 at 15:01;  Status DC


Lidocaine HCl (Lidocaine 1% 20ml Vial) 20 ml 1X  ONCE INJ  Last administered on 

6/7/20at 15:30;  Start 6/7/20 at 15:00;  Stop 6/7/20 at 15:01;  Status DC


Sodium Chloride 1,000 ml @  100 mls/hr Q10H IV  Last administered on 6/16/20at 

07:30;  Start 6/7/20 at 20:00


Sodium Bicarbonate (Sodium Bicarb Adult 8.4% Syr) 50 meq 1X  ONCE IV  Last 

administered on 6/7/20at 21:47;  Start 6/7/20 at 22:00;  Stop 6/7/20 at 22:01;  

Status DC


Potassium Acetate 40 meq/Magnesium Sulfate 5 meq/ Multivitamins 10 ml/Chromium/ 

Copper/Manganese/ Seleni/Zn 1 ml/ Insulin Human Regular 30 unit/ Total Parentera

l Nutrition/Amino Acids/Dextrose/ Fat Emulsion Intravenous 1,920 ml @  80 mls/hr

TPN  CONT IV  Last administered on 6/8/20at 22:28;  Start 6/8/20 at 22:00;  Stop

6/9/20 at 21:59;  Status DC


Sodium Chloride 500 ml @  500 mls/hr 1X  ONCE IV  Last administered on 6/9/20at 

06:39;  Start 6/9/20 at 06:45;  Stop 6/9/20 at 07:44;  Status DC


Potassium Acetate 40 meq/Magnesium Sulfate 5 meq/ Multivitamins 10 ml/Chromium/ 

Copper/Manganese/ Seleni/Zn 1 ml/ Insulin Human Regular 30 unit/ Total 

Parenteral Nutrition/Amino Acids/Dextrose/ Fat Emulsion Intravenous 1,920 ml @  

80 mls/hr TPN  CONT IV  Last administered on 6/9/20at 22:03;  Start 6/9/20 at 

22:00;  Stop 6/10/20 at 21:59;  Status DC


Metoprolol Tartrate (Lopressor Vial) 5 mg PRN Q6HRS  PRN IVP HYPERTENSION Last 

administered on 6/13/20at 04:03;  Start 6/10/20 at 09:00


Potassium Acetate 40 meq/Magnesium Sulfate 5 meq/ Multivitamins 10 ml/Chromium/ 

Copper/Manganese/ Seleni/Zn 1 ml/ Insulin Human Regular 30 unit/ Total 

Parenteral Nutrition/Amino Acids/Dextrose/ Fat Emulsion Intravenous 1,920 ml @  

80 mls/hr TPN  CONT IV  Last administered on 6/10/20at 21:26;  Start 6/10/20 at 

22:00;  Stop 6/11/20 at 21:59;  Status DC


Potassium Acetate 40 meq/Magnesium Sulfate 5 meq/ Multivitamins 10 ml/Chromium/ 

Copper/Manganese/ Seleni/Zn 1 ml/ Insulin Human Regular 30 unit/ Total 

Parenteral Nutrition/Amino Acids/Dextrose/ Fat Emulsion Intravenous 1,920 ml @  

80 mls/hr TPN  CONT IV  Last administered on 6/11/20at 23:23;  Start 6/11/20 at 

22:00;  Stop 6/12/20 at 21:59;  Status DC


Potassium Acetate 40 meq/Magnesium Sulfate 5 meq/ Multivitamins 10 ml/Chromium/ 

Copper/Manganese/ Seleni/Zn 1 ml/ Insulin Human Regular 30 unit/ Total 

Parenteral Nutrition/Amino Acids/Dextrose/ Fat Emulsion Intravenous 1,920 ml @  

80 mls/hr TPN  CONT IV  Last administered on 6/12/20at 21:35;  Start 6/12/20 at 

22:00;  Stop 6/13/20 at 21:59;  Status DC


Furosemide (Lasix) 20 mg 1X  ONCE IVP  Last administered on 6/13/20at 06:26;  

Start 6/13/20 at 06:15;  Stop 6/13/20 at 06:16;  Status DC


Methylprednisolone Sodium Succinate (SOLU-Medrol 125MG VIAL) 125 mg 1X  ONCE IV 

Last administered on 6/13/20at 06:26;  Start 6/13/20 at 06:15;  Stop 6/13/20 at 

06:16;  Status DC


Albuterol/ Ipratropium (Duoneb) 3 ml Q4HRS NEB  Last administered on 6/15/20at 

23:45;  Start 6/13/20 at 08:00


Fentanyl Citrate 30 ml @ 0 mls/hr CONT  PRN IV SEE PROTOCOL Last administered on

6/15/20at 19:58;  Start 6/13/20 at 06:00


Propofol 100 ml @ 0 mls/hr CONT  PRN IV SEE PROTOCOL Last administered on 

6/15/20at 13:10;  Start 6/13/20 at 06:00


Fentanyl Citrate (Fentanyl 2ml Vial) 25 mcg PRN Q1HR  PRN IV SEE COMMENTS;  

Start 6/13/20 at 06:00


Fentanyl Citrate (Fentanyl 2ml Vial) 50 mcg PRN Q1HR  PRN IV SEE COMMENTS;  

Start 6/13/20 at 06:00


Chlorhexidine Gluconate (Peridex) 15 ml BID MM ;  Start 6/13/20 at 09:00;  Stop 

6/13/20 at 07:58;  Status DC


Potassium Acetate 40 meq/Magnesium Sulfate 5 meq/ Multivitamins 10 ml/Chromium/ 

Copper/Manganese/ Seleni/Zn 1 ml/ Insulin Human Regular 30 unit/ Total 

Parenteral Nutrition/Amino Acids/Dextrose/ Fat Emulsion Intravenous 1,920 ml @  

80 mls/hr TPN  CONT IV  Last administered on 6/13/20at 21:19;  Start 6/13/20 at 

22:00;  Stop 6/14/20 at 21:59;  Status DC


Acetylcysteine (Mucomyst 20% Resp Treatment) 600 mg BID NEB  Last administered 

on 6/15/20at 20:24;  Start 6/13/20 at 21:00


Magnesium Sulfate 100 ml @  25 mls/hr 1X  ONCE IV  Last administered on 

6/13/20at 15:48;  Start 6/13/20 at 15:45;  Stop 6/13/20 at 19:44;  Status DC


Potassium Acetate 40 meq/Magnesium Sulfate 5 meq/ Multivitamins 10 ml/Chromium/ 

Copper/Manganese/ Seleni/Zn 1 ml/ Insulin Human Regular 30 unit/ Total 

Parenteral Nutrition/Amino Acids/Dextrose/ Fat Emulsion Intravenous 1,920 ml @  

80 mls/hr TPN  CONT IV  Last administered on 6/14/20at 21:35;  Start 6/14/20 at 

22:00;  Stop 6/15/20 at 21:59;  Status DC


Potassium Chloride/Water 100 ml @  100 mls/hr Q1H IV  Last administered on 

6/15/20at 08:31;  Start 6/15/20 at 07:00;  Stop 6/15/20 at 08:59;  Status DC


Potassium Acetate 40 meq/Magnesium Sulfate 5 meq/ Multivitamins 10 ml/Chromium/ 

Copper/Manganese/ Seleni/Zn 1 ml/ Insulin Human Regular 30 unit/ Total 

Parenteral Nutrition/Amino Acids/Dextrose/ Fat Emulsion Intravenous 1,920 ml @  

80 mls/hr TPN  CONT IV  Last administered on 6/15/20at 21:54;  Start 6/15/20 at 

22:00;  Stop 6/16/20 at 21:59


Lidocaine HCl (Buffered Lidocaine 1%) 3 ml STK-MED ONCE .ROUTE ;  Start 6/15/20 

at 12:14;  Stop 6/15/20 at 12:14;  Status DC


Lidocaine HCl (Buffered Lidocaine 1%) 3 ml 1X  ONCE IJ  Last administered on 

6/15/20at 13:11;  Start 6/15/20 at 13:00;  Stop 6/15/20 at 13:01;  Status DC


Magnesium Sulfate 50 ml @ 25 mls/hr 1X  ONCE IV ;  Start 6/16/20 at 08:15;  Stop

6/16/20 at 10:14





Active Scripts


Active


Reported


Bisoprolol Fumarate 5 Mg Tablet 10 Mg PO DAILY


Vitals/I & O





Vital Sign - Last 24 Hours








 6/15/20 6/15/20 6/15/20 6/15/20





 10:00 11:00 12:00 12:00


 


Temp    99.0





    99.0


 


Pulse 113 105  115


 


Resp 20 22  31


 


B/P (MAP) 126/70 (88) 124/67 (86)  140/74 (96)


 


Pulse Ox 100 100  98


 


O2 Delivery Ventilator Tracheal Collar Trach Collar Tracheal Collar


 


    





    





 6/15/20 6/15/20 6/15/20 6/15/20





 12:00 13:10 13:15 13:20


 


Pulse  120 121 123


 


Resp  24 22 22


 


B/P (MAP)  154/80 (104) 160/85 (110) 161/93 (115)


 


Pulse Ox  97 98 98


 


O2 Delivery  Tracheal Collar Tracheal Collar Tracheal Collar





 6/15/20 6/15/20 6/15/20 6/15/20





 13:25 13:30 13:35 13:40


 


Pulse 120 122 119 121


 


Resp 22 22 22 22


 


B/P (MAP) 159/91 (113) 147/93 (111) 142/83 (102) 150/86 (107)


 


Pulse Ox 98 98 98 97


 


O2 Delivery Tracheal Collar Tracheal Collar Tracheal Collar Tracheal Collar





 6/15/20 6/15/20 6/15/20 6/15/20





 14:00 15:00 15:50 16:00


 


Temp    98.8





    98.8


 


Pulse 115 120  115


 


Resp 38 36  27


 


B/P (MAP) 143/78 (99) 148/80 (102)  144/77 (99)


 


Pulse Ox 97 97  98


 


O2 Delivery Tracheal Collar Tracheal Collar Trach Collar Tracheal Collar


 


    





    





 6/15/20 6/15/20 6/15/20 6/15/20





 16:00 16:24 17:00 18:00


 


Pulse   116 110


 


Resp   27 22


 


B/P (MAP)   142/76 (98) 138/72 (94)


 


Pulse Ox  96 98 98


 


O2 Delivery  Tracheal Collar Tracheal Collar Tracheal Collar


 


O2 Flow Rate  9.0  





 6/15/20 6/15/20 6/15/20 6/15/20





 19:00 20:00 20:00 20:00


 


Temp  98.2  





  98.2  


 


Pulse 106 104  


 


Resp 22 28  


 


B/P (MAP) 134/71 (92) 146/76 (99)  


 


Pulse Ox 99 99  


 


O2 Delivery Tracheal Collar Tracheal Collar Trach Collar 


 


    





    





 6/15/20 6/15/20 6/15/20 6/15/20





 20:28 20:29 21:00 22:00


 


Pulse   104 110


 


Resp   24 27


 


B/P (MAP)   128/72 (90) 134/76 (95)


 


Pulse Ox 99 99 98 98


 


O2 Delivery Tracheal Collar Tracheal Collar Tracheal Collar Tracheal Collar


 


O2 Flow Rate 9.0 9.0  





 6/15/20 6/15/20 6/16/20 6/16/20





 23:00 23:45 00:00 00:00


 


Temp    98.9





    98.9


 


Pulse 104   96


 


Resp 21   20


 


B/P (MAP) 118/92 (101)   124/80 (95)


 


Pulse Ox 98 99  98


 


O2 Delivery Tracheal Collar Ventilator  Ventilator


 


    





    





 6/16/20 6/16/20 6/16/20 6/16/20





 00:00 01:00 02:00 03:00


 


Pulse  90 102 100


 


Resp  20 25 20


 


B/P (MAP)  116/88 (97) 168/88 (114) 138/72 (94)


 


Pulse Ox  100 99 100


 


O2 Delivery Mechanical Ventilator Ventilator Ventilator Ventilator





 6/16/20 6/16/20 6/16/20 6/16/20





 03:45 04:00 04:00 04:00


 


Temp   99.1 





   99.1 


 


Pulse   100 


 


Resp   20 


 


B/P (MAP)   136/72 (93) 


 


Pulse Ox 99  100 


 


O2 Delivery Ventilator Mechanical Ventilator Ventilator 


 


    





    





 6/16/20 6/16/20 6/16/20 6/16/20





 05:00 06:00 07:00 07:40


 


Pulse 106 116 106 


 


Resp 20 20 20 


 


B/P (MAP) 132/72 (92) 140/74 (96) 136/68 (90) 


 


Pulse Ox 98 99 99 99


 


O2 Delivery Ventilator Ventilator Ventilator Ventilator





 6/16/20 6/16/20 6/16/20 6/16/20





 07:44 07:44 08:00 08:00


 


Temp    97.7





    97.7


 


Pulse    108


 


Resp    20


 


B/P (MAP)    140/76 (97)


 


Pulse Ox    99


 


O2 Delivery  Mechanical Ventilator  Ventilator


 


    





    





 6/16/20   





 08:30   


 


Pulse Ox 95   


 


O2 Delivery Tracheal Collar   














Intake and Output   


 


 6/15/20 6/15/20 6/16/20





 15:00 23:00 07:00


 


Intake Total 400 ml 2692 ml 1566 ml


 


Output Total 920 ml 1085 ml 1320 ml


 


Balance -520 ml 1607 ml 246 ml











Hemodynamically unstable?:  No


Is patient in severe pain?:  No


Is NPO status required?:  No











HIRAM BARRERA MD             Jun 16, 2020 09:40

## 2020-06-16 NOTE — PDOC
SAURAV NASH APRN 6/16/20 1055:


SURGICAL PROGRESS NOTE


Subjective


up to chair


minimal drain output


Vital Signs





Vital Signs








  Date Time  Temp Pulse Resp B/P (MAP) Pulse Ox O2 Delivery O2 Flow Rate FiO2


 


6/16/20 10:00  124 24 124/60 (81) 99 Tracheal Collar 9.0 


 


6/16/20 08:00 97.7       





 97.7       








I&O











Intake and Output 


 


 6/16/20





 07:00


 


Intake Total 4658 ml


 


Output Total 3400 ml


 


Balance 1258 ml


 


 


 


Intake Oral 0 ml


 


IV Total 4658 ml


 


Output Urine Total 1885 ml


 


Gastric Drainage Total 150 ml


 


Emesis 300 ml


 


Chest Tube Drainage Total 890 ml


 


Drainage Total 175 ml








General:  Oriented X3, Cooperative


HEENT:  Other (trach)


Abdomen:  Soft, Other (drains in place)


Labs





Laboratory Tests








Test


 6/14/20


12:11 6/14/20


17:52 6/15/20


00:23 6/15/20


05:45


 


Glucose (Fingerstick)


 159 mg/dL


(70-99) 129 mg/dL


(70-99) 118 mg/dL


(70-99) 





 


White Blood Count


 


 


 


 8.1 x10^3/uL


(4.0-11.0)


 


Red Blood Count


 


 


 


 3.28 x10^6/uL


(3.50-5.40)


 


Hemoglobin


 


 


 


 9.3 g/dL


(12.0-15.5)


 


Hematocrit


 


 


 


 27.8 %


(36.0-47.0)


 


Mean Corpuscular Volume    85 fL () 


 


Mean Corpuscular Hemoglobin    28 pg (25-35) 


 


Mean Corpuscular Hemoglobin


Concent 


 


 


 34 g/dL


(31-37)


 


Red Cell Distribution Width


 


 


 


 18.6 %


(11.5-14.5)


 


Platelet Count


 


 


 


 434 x10^3/uL


(140-400)


 


Neutrophils (%) (Auto)    80 % (31-73) 


 


Lymphocytes (%) (Auto)    15 % (24-48) 


 


Monocytes (%) (Auto)    5 % (0-9) 


 


Eosinophils (%) (Auto)    1 % (0-3) 


 


Basophils (%) (Auto)    0 % (0-3) 


 


Neutrophils # (Auto)


 


 


 


 6.4 x10^3/uL


(1.8-7.7)


 


Lymphocytes # (Auto)


 


 


 


 1.2 x10^3/uL


(1.0-4.8)


 


Monocytes # (Auto)


 


 


 


 0.4 x10^3/uL


(0.0-1.1)


 


Eosinophils # (Auto)


 


 


 


 0.1 x10^3/uL


(0.0-0.7)


 


Basophils # (Auto)


 


 


 


 0.0 x10^3/uL


(0.0-0.2)


 


Sodium Level


 


 


 


 143 mmol/L


(136-145)


 


Potassium Level


 


 


 


 3.3 mmol/L


(3.5-5.1)


 


Chloride Level


 


 


 


 111 mmol/L


()


 


Carbon Dioxide Level


 


 


 


 23 mmol/L


(21-32)


 


Anion Gap    9 (6-14) 


 


Blood Urea Nitrogen


 


 


 


 34 mg/dL


(7-20)


 


Creatinine


 


 


 


 0.7 mg/dL


(0.6-1.0)


 


Estimated GFR


(Cockcroft-Gault) 


 


 


 88.9 





 


BUN/Creatinine Ratio    49 (6-20) 


 


Glucose Level


 


 


 


 125 mg/dL


(70-99)


 


Calcium Level


 


 


 


 8.8 mg/dL


(8.5-10.1)


 


Magnesium Level


 


 


 


 1.8 mg/dL


(1.8-2.4)


 


Total Bilirubin


 


 


 


 0.5 mg/dL


(0.2-1.0)


 


Aspartate Amino Transf


(AST/SGOT) 


 


 


 62 U/L (15-37) 





 


Alanine Aminotransferase


(ALT/SGPT) 


 


 


 72 U/L (14-59) 





 


Alkaline Phosphatase


 


 


 


 132 U/L


()


 


Total Protein


 


 


 


 4.5 g/dL


(6.4-8.2)


 


Albumin


 


 


 


 1.1 g/dL


(3.4-5.0)


 


Albumin/Globulin Ratio    0.3 (1.0-1.7) 


 


Test


 6/15/20


05:54 6/15/20


08:10 6/15/20


11:47 6/15/20


17:24


 


Glucose (Fingerstick)


 115 mg/dL


(70-99) 


 121 mg/dL


(70-99) 105 mg/dL


(70-99)


 


O2 Saturation  97 % (92-99)   


 


Arterial Blood pH


 


 7.46


(7.35-7.45) 


 





 


Arterial Blood pCO2 at


Patient Temp 


 31 mmHg


(35-46) 


 





 


Arterial Blood pO2 at Patient


Temp 


 117 mmHg


() 


 





 


Arterial Blood HCO3


 


 22 mmol/L


(21-28) 


 





 


Arterial Blood Base Excess


 


 -2 mmol/L


(-3-3) 


 





 


FiO2  35   


 


Test


 6/16/20


01:26 6/16/20


06:20 6/16/20


08:00 





 


Glucose (Fingerstick)


 113 mg/dL


(70-99) 122 mg/dL


(70-99) 


 





 


White Blood Count


 


 8.4 x10^3/uL


(4.0-11.0) 


 





 


Red Blood Count


 


 3.38 x10^6/uL


(3.50-5.40) 


 





 


Hemoglobin


 


 9.7 g/dL


(12.0-15.5) 


 





 


Hematocrit


 


 28.9 %


(36.0-47.0) 


 





 


Mean Corpuscular Volume  86 fL ()   


 


Mean Corpuscular Hemoglobin  29 pg (25-35)   


 


Mean Corpuscular Hemoglobin


Concent 


 34 g/dL


(31-37) 


 





 


Red Cell Distribution Width


 


 18.6 %


(11.5-14.5) 


 





 


Platelet Count


 


 432 x10^3/uL


(140-400) 


 





 


Neutrophils (%) (Auto)  79 % (31-73)   


 


Lymphocytes (%) (Auto)  16 % (24-48)   


 


Monocytes (%) (Auto)  4 % (0-9)   


 


Eosinophils (%) (Auto)  1 % (0-3)   


 


Basophils (%) (Auto)  0 % (0-3)   


 


Neutrophils # (Auto)


 


 6.6 x10^3/uL


(1.8-7.7) 


 





 


Lymphocytes # (Auto)


 


 1.3 x10^3/uL


(1.0-4.8) 


 





 


Monocytes # (Auto)


 


 0.3 x10^3/uL


(0.0-1.1) 


 





 


Eosinophils # (Auto)


 


 0.1 x10^3/uL


(0.0-0.7) 


 





 


Basophils # (Auto)


 


 0.0 x10^3/uL


(0.0-0.2) 


 





 


Sodium Level


 


 141 mmol/L


(136-145) 


 





 


Potassium Level


 


 3.6 mmol/L


(3.5-5.1) 


 





 


Chloride Level


 


 108 mmol/L


() 


 





 


Carbon Dioxide Level


 


 23 mmol/L


(21-32) 


 





 


Anion Gap  10 (6-14)   


 


Blood Urea Nitrogen


 


 28 mg/dL


(7-20) 


 





 


Creatinine


 


 0.7 mg/dL


(0.6-1.0) 


 





 


Estimated GFR


(Cockcroft-Gault) 


 88.9 


 


 





 


Glucose Level


 


 129 mg/dL


(70-99) 


 





 


Calcium Level


 


 8.9 mg/dL


(8.5-10.1) 


 





 


Magnesium Level


 


 1.5 mg/dL


(1.8-2.4) 


 





 


Triglycerides Level


 


 251 mg/dL


(0-150) 


 





 


O2 Saturation   98 % (92-99)  


 


Arterial Blood pH


 


 


 7.39


(7.35-7.45) 





 


Arterial Blood pCO2 at


Patient Temp 


 


 34 mmHg


(35-46) 





 


Arterial Blood pO2 at Patient


Temp 


 


 121 mmHg


() 





 


Arterial Blood HCO3


 


 


 20 mmol/L


(21-28) 





 


Arterial Blood Base Excess


 


 


 -4 mmol/L


(-3-3) 





 


FiO2   35%  








Laboratory Tests








Test


 6/15/20


11:47 6/15/20


17:24 6/16/20


01:26 6/16/20


06:20


 


Glucose (Fingerstick)


 121 mg/dL


(70-99) 105 mg/dL


(70-99) 113 mg/dL


(70-99) 122 mg/dL


(70-99)


 


White Blood Count


 


 


 


 8.4 x10^3/uL


(4.0-11.0)


 


Red Blood Count


 


 


 


 3.38 x10^6/uL


(3.50-5.40)


 


Hemoglobin


 


 


 


 9.7 g/dL


(12.0-15.5)


 


Hematocrit


 


 


 


 28.9 %


(36.0-47.0)


 


Mean Corpuscular Volume    86 fL () 


 


Mean Corpuscular Hemoglobin    29 pg (25-35) 


 


Mean Corpuscular Hemoglobin


Concent 


 


 


 34 g/dL


(31-37)


 


Red Cell Distribution Width


 


 


 


 18.6 %


(11.5-14.5)


 


Platelet Count


 


 


 


 432 x10^3/uL


(140-400)


 


Neutrophils (%) (Auto)    79 % (31-73) 


 


Lymphocytes (%) (Auto)    16 % (24-48) 


 


Monocytes (%) (Auto)    4 % (0-9) 


 


Eosinophils (%) (Auto)    1 % (0-3) 


 


Basophils (%) (Auto)    0 % (0-3) 


 


Neutrophils # (Auto)


 


 


 


 6.6 x10^3/uL


(1.8-7.7)


 


Lymphocytes # (Auto)


 


 


 


 1.3 x10^3/uL


(1.0-4.8)


 


Monocytes # (Auto)


 


 


 


 0.3 x10^3/uL


(0.0-1.1)


 


Eosinophils # (Auto)


 


 


 


 0.1 x10^3/uL


(0.0-0.7)


 


Basophils # (Auto)


 


 


 


 0.0 x10^3/uL


(0.0-0.2)


 


Sodium Level


 


 


 


 141 mmol/L


(136-145)


 


Potassium Level


 


 


 


 3.6 mmol/L


(3.5-5.1)


 


Chloride Level


 


 


 


 108 mmol/L


()


 


Carbon Dioxide Level


 


 


 


 23 mmol/L


(21-32)


 


Anion Gap    10 (6-14) 


 


Blood Urea Nitrogen


 


 


 


 28 mg/dL


(7-20)


 


Creatinine


 


 


 


 0.7 mg/dL


(0.6-1.0)


 


Estimated GFR


(Cockcroft-Gault) 


 


 


 88.9 





 


Glucose Level


 


 


 


 129 mg/dL


(70-99)


 


Calcium Level


 


 


 


 8.9 mg/dL


(8.5-10.1)


 


Magnesium Level


 


 


 


 1.5 mg/dL


(1.8-2.4)


 


Triglycerides Level


 


 


 


 251 mg/dL


(0-150)


 


Test


 6/16/20


08:00 


 


 





 


O2 Saturation 98 % (92-99)    


 


Arterial Blood pH


 7.39


(7.35-7.45) 


 


 





 


Arterial Blood pCO2 at


Patient Temp 34 mmHg


(35-46) 


 


 





 


Arterial Blood pO2 at Patient


Temp 121 mmHg


() 


 


 





 


Arterial Blood HCO3


 20 mmol/L


(21-28) 


 


 





 


Arterial Blood Base Excess


 -4 mmol/L


(-3-3) 


 


 





 


FiO2 35%    








Problem List


Problems


Medical Problems:


(1) Acute pancreatitis


Status: Acute  





(2) Cholelithiasis


Status: Acute  








Assessment/Plan


supportive care





Justicifation of Admission Dx:


Justifications for Admission:


Justification of Admission Dx:  Yes





KIEL BAL MD 6/16/20 1328:


SURGICAL PROGRESS NOTE


Assessment/Plan


up to chair


responds to voice


no new recs











SAURAV NASH            Jun 16, 2020 10:55


KIEL BAL MD                Jun 16, 2020 13:28

## 2020-06-16 NOTE — NUR
SW following. Discussed with RN, pt on a vent. Not stable for discharge. SW will continue to 
follow.

## 2020-06-16 NOTE — PDOC
Infectious Disease Note


Subjective


Subjective


Patient better. A little nausea. pain ok


Continues to have blood stained drainage from drains


no fevers last 24 hours





ROS


ROS


o/w neg





Vital Sign


Vital Signs





Vital Signs








  Date Time  Temp Pulse Resp B/P (MAP) Pulse Ox O2 Delivery O2 Flow Rate FiO2


 


6/16/20 06:00  116 20 140/74 (96) 99 Ventilator  


 


6/16/20 04:00 99.1       





 99.1       


 


6/15/20 20:29       9.0 











Physical Exam


PHYSICAL EXAM


GENERAL: More Alert and looks comfortable


HEENT: NGT in place. Oral mucosa dry


NECK:  Tracheostomy 


LUNGS: Diminished aeration bases, no accessory muscle use - CT on left with 

clear fluid


HEART:  S1, S2, tachy, regular 


ABDOMEN: Mild distention, bowel sounds present, soft, grimaces to palpation 


Right lateral side drainage bag, 3 drains with bloodstained drainage.  Left side

with drainage from previous drain site - dressed


: [place] ( 6/ 7)


EXTREMITIES: Trace edema .no  cyanosis 


SKIN: no signs of gen rash 


CNS: Lethargic very weak


LUE-PICC (5/29) without signs of complications - art line without complications





Labs


Lab





Laboratory Tests








Test


 6/15/20


08:10 6/15/20


11:47 6/15/20


17:24 6/16/20


01:26


 


O2 Saturation 97 % (92-99)    


 


Arterial Blood pH


 7.46


(7.35-7.45) 


 


 





 


Arterial Blood pCO2 at


Patient Temp 31 mmHg


(35-46) 


 


 





 


Arterial Blood pO2 at Patient


Temp 117 mmHg


() 


 


 





 


Arterial Blood HCO3


 22 mmol/L


(21-28) 


 


 





 


Arterial Blood Base Excess


 -2 mmol/L


(-3-3) 


 


 





 


FiO2 35    


 


Glucose (Fingerstick)


 


 121 mg/dL


(70-99) 105 mg/dL


(70-99) 113 mg/dL


(70-99)


 


Test


 6/16/20


06:20 


 


 





 


White Blood Count


 8.4 x10^3/uL


(4.0-11.0) 


 


 





 


Red Blood Count


 3.38 x10^6/uL


(3.50-5.40) 


 


 





 


Hemoglobin


 9.7 g/dL


(12.0-15.5) 


 


 





 


Hematocrit


 28.9 %


(36.0-47.0) 


 


 





 


Mean Corpuscular Volume 86 fL ()    


 


Mean Corpuscular Hemoglobin 29 pg (25-35)    


 


Mean Corpuscular Hemoglobin


Concent 34 g/dL


(31-37) 


 


 





 


Red Cell Distribution Width


 18.6 %


(11.5-14.5) 


 


 





 


Platelet Count


 432 x10^3/uL


(140-400) 


 


 





 


Neutrophils (%) (Auto) 79 % (31-73)    


 


Lymphocytes (%) (Auto) 16 % (24-48)    


 


Monocytes (%) (Auto) 4 % (0-9)    


 


Eosinophils (%) (Auto) 1 % (0-3)    


 


Basophils (%) (Auto) 0 % (0-3)    


 


Neutrophils # (Auto)


 6.6 x10^3/uL


(1.8-7.7) 


 


 





 


Lymphocytes # (Auto)


 1.3 x10^3/uL


(1.0-4.8) 


 


 





 


Monocytes # (Auto)


 0.3 x10^3/uL


(0.0-1.1) 


 


 





 


Eosinophils # (Auto)


 0.1 x10^3/uL


(0.0-0.7) 


 


 





 


Basophils # (Auto)


 0.0 x10^3/uL


(0.0-0.2) 


 


 





 


Sodium Level


 141 mmol/L


(136-145) 


 


 





 


Potassium Level


 3.6 mmol/L


(3.5-5.1) 


 


 





 


Chloride Level


 108 mmol/L


() 


 


 





 


Carbon Dioxide Level


 23 mmol/L


(21-32) 


 


 





 


Anion Gap 10 (6-14)    


 


Blood Urea Nitrogen


 28 mg/dL


(7-20) 


 


 





 


Creatinine


 0.7 mg/dL


(0.6-1.0) 


 


 





 


Estimated GFR


(Cockcroft-Gault) 88.9 


 


 


 





 


Glucose Level


 129 mg/dL


(70-99) 


 


 





 


Glucose (Fingerstick)


 122 mg/dL


(70-99) 


 


 





 


Calcium Level


 8.9 mg/dL


(8.5-10.1) 


 


 





 


Magnesium Level


 1.5 mg/dL


(1.8-2.4) 


 


 





 


Triglycerides Level


 251 mg/dL


(0-150) 


 


 











Micro


6/7 





GRAM STAIN  Final  


        Final





        GRAM NEGATIVE RODS:MODERATE


        SQUAMOUS EPI CELL:NOT APPLICABLE


        PMN (WBCs):RARE


        YEAST:MODERATE


        Unless otherwise specified, Testing Performed by:


        [address] [address]


        For Inquires, the Physician may contact the Microbiology


        department at [phone]





  ANAEROBIC-AEROBIC CULTURE  Preliminary  


        Preliminary





        MANY GRAM NEGATIVE RODS on 06/09/20 at 1158


        FINAL ID= [PSEUDOMONAS AERUGINOSA]


        PSEUDOMONAS AERUGINOSA





  ANTIMICROBIAL SUSCEPTIBILITY  Preliminary  


        Comment





        NEG ANSON 56


        PSEUDOMONAS AERUGINOSA


        ANTIBIOTIC                        RESULT          INTERPRETATION


        AMIKACIN                          <=16                  S


        AZTREONAM                         >16                   R


        CEFTAZIDIME                       >16                   R


        CIPROFLOXACIN                     <=0.25                S


        CEFEPIME                          16                    I


        CEFTAZIDIME/AVIBACTAM             <=4                   S


        GENTAMICIN                        <=2                   S














                               ** CONTINUED ON NEXT PAGE **





---

--------------------------------------------------------------------------------


---------





RUN DATE: 06/11/20                  [facility] *LIVE*               

  PAGE 2   


RUN TIME: 1016                            Specimen Inquiry                    


 

--------------------------------------------------------------------------------


------------





SPEC: [id number]    PATIENT: JESENIA BEAN                [id number]  

(Continued)


------------------------------------------------------------------

--------------------------








 

--------------------------------------------------------------------------------


------------





  Procedure                         Result                                      

         


------------------------------------------

--------------------------------------------------





  ANTIMICROBIAL SUSCEPTIBILITY  Preliminary   (continued)


        LEVOFLOXACIN                      <=0.5                 S


        MEROPENEM                         <=1                   S


        PIPERACILLIN/TAZOBACTAM           64                    S


        TOBRAMYCIN                        <=2                   S


        Unless otherwise specified, Testing Performed by:


        [facility]


        [address] [place] [address]


        For Inquires, the Physician may contact the Microbiology


        department at [phone]











CT Scan 6/6





IMPRESSION:


1. Removal of the percutaneous pigtail drainage catheters since the prior 


exam. Sequela of pancreatitis with extensive pseudocysts again 


demonstrated, the right-sided collections are slightly larger since the 


prior exam, the left-sided collections are stable. See above.


2. Moderate to large left pleural effusion with atelectasis and collapse 


of most of the left lower lobe, stable. Small right pleural effusion is 


stable.


3. Gallstone.





Objective


Assessment


Patient with prolonged hospitalization


Multiple medical problems


Multiple surgical procedures





S/p CT on left 6/15 890 ml overnight


6/7 fluid cult PSAE (MDRO),yeast moderate s/p drain times three 


CXR with Left lung white-out 6/13 ? effusion PSA 6/15 in sputum


Acute hypoxic resp failure on 35 % and 5 PEEP


Transaminitis - mild


Fever improving


Acute pancreatitis with persistent  necrosis


CT a/p 4/9


    Increased ascites. Persistent evidence of necrotizing pancreatitis with 

fluid and phlegmon


   at the pancreas


   4/27 status post ROBERT drain placement; yeast


   5/6 fluid  candida parapsilosis fluid amylase high


CT 6/7


IMPRESSION:


1.   Sequela of pancreatitis with extensive pseudocysts again 


demonstrated, the right-sided collections are slightly larger since the 


prior exam, the left-sided collections are stable. 








Cholelithiasis with thickening of the gallbladder wall.


Leucocytosis - better


JED,Hyperkalemia, Metabolic acidosis off dialysis


Acute hypoxic resp failure ,bilateral pleural effusion and atelectasis


hypocalcemia 


Prediabetes


HTN


s/p trach





S/p thoracenteis 5/12





Plan


Plan of Care














Sputum from 6/13 - growing PSA - may be colonization


Continue meropenem, has MDRP PSAE June 7 from abd


cont micafungin June 7


Follow-up cultures fluid for yeast


IR eval abd drains


Monitor a.m. labs - CMP/CBC


General surgery following


Monitor drain output


Maintain aspiration precaution


Supportive care





 [facility] micro [phone]








Critically ill








D/w nursing











RASHAWN ROSEN MD              Jun 16, 2020 08:17

## 2020-06-16 NOTE — NUR
Pharmacy TPN Dosing Note



S: JESENIA BEAN is a 49 year old F Currently receiving Central Continuous TPN started 
03/18/20



B:Pertinent PMH: Necrotizing pancreatitis



Height: 5 feet, 8 inches

Weight: 82.5 kg



Current diet: NPO 



LABS:

Sodium:    141 

Potassium: 3.6 

Chloride:  108 

Calcium:   8.9 

Corrected Calcium: 11.22 

Magnesium: 1.5 

CO2:       23 

SCr:       0.7 

Glucose:   113, 129, 122 

Albumin:   1.1 

AST:       62 

ALT:       72 



TPN FORMULA:

TPN TYPE:  Central Continuous

AMINO ACIDS:         75 gm

DEXTROSE:            250 gm

LIPIDS:              20 gm

POTASSIUM ACETATE:   40 mEq

MAGNESIUM:           10 mEq

INSULIN:             20 units

MULTIPLE VITAMIN:    10 ml

TRACE ELEMENTS:      1 ml



TPN PLAN:  

-Off propofol infusion, serum  today.

-Serum potassium WNL, no change in TPN.

-Serum magnesium low. Getting mag sulfate 2g x 1 per primary. Increase mag sulfate to 10 
mEq/day in TPN.

-Blood glucose values below goal range of 140-180; reduce regular insulin to 20 units/day.

-CMP tomorrow per ID, magnesium per protocol.





R: Continue TPN @ 80 ml/hr and above formula. 

Will monitor electrolytes, glucose, and tolerance to TPN.



 VALERIE SNELL [facility], 06/16/20 1011

## 2020-06-16 NOTE — PDOC
PULMONARY PROGRESS NOTES


Subjective


off vent / on TS , no distress


Patient intubated on 3/23 , s/p trach 4/6, 


transferred back to ICU 6/5 for syncope with collapse


developed anemia, blood drainage from RLQ abdomen drain site, and surrounding 

firmness  / developed septic shock 6/7 from abdomen source, required levo 6/7


s/p 3 new drains 6/7 with brown color drainage


Vitals





Vital Signs








  Date Time  Temp Pulse Resp B/P (MAP) Pulse Ox O2 Delivery O2 Flow Rate FiO2


 


6/16/20 11:18   25  99 Tracheal Collar 9.0 


 


6/16/20 11:00  118  130/67 (88)    


 


6/16/20 08:00 97.7       





 97.7       








Comments


awake,no soa


General:  Alert


HEENT:  Other (nc at perrl   nose clear    neck  trach site ok   no lab  no 

thyromegaly)


Lungs:  Other (diminshed in bases, Rhonci in LLL)


Cardiovascular:  S1, S2


Abdomen:  Soft, Non-tender, Other (bleeding from Sx. site RLQ and firmness)


Extremities:  Other (+1 BLE edema)


Skin:  Warm, Dry


Labs





Laboratory Tests








Test


 6/14/20


12:11 6/14/20


17:52 6/15/20


00:23 6/15/20


05:45


 


Glucose (Fingerstick)


 159 mg/dL


(70-99) 129 mg/dL


(70-99) 118 mg/dL


(70-99) 





 


White Blood Count


 


 


 


 8.1 x10^3/uL


(4.0-11.0)


 


Red Blood Count


 


 


 


 3.28 x10^6/uL


(3.50-5.40)


 


Hemoglobin


 


 


 


 9.3 g/dL


(12.0-15.5)


 


Hematocrit


 


 


 


 27.8 %


(36.0-47.0)


 


Mean Corpuscular Volume    85 fL () 


 


Mean Corpuscular Hemoglobin    28 pg (25-35) 


 


Mean Corpuscular Hemoglobin


Concent 


 


 


 34 g/dL


(31-37)


 


Red Cell Distribution Width


 


 


 


 18.6 %


(11.5-14.5)


 


Platelet Count


 


 


 


 434 x10^3/uL


(140-400)


 


Neutrophils (%) (Auto)    80 % (31-73) 


 


Lymphocytes (%) (Auto)    15 % (24-48) 


 


Monocytes (%) (Auto)    5 % (0-9) 


 


Eosinophils (%) (Auto)    1 % (0-3) 


 


Basophils (%) (Auto)    0 % (0-3) 


 


Neutrophils # (Auto)


 


 


 


 6.4 x10^3/uL


(1.8-7.7)


 


Lymphocytes # (Auto)


 


 


 


 1.2 x10^3/uL


(1.0-4.8)


 


Monocytes # (Auto)


 


 


 


 0.4 x10^3/uL


(0.0-1.1)


 


Eosinophils # (Auto)


 


 


 


 0.1 x10^3/uL


(0.0-0.7)


 


Basophils # (Auto)


 


 


 


 0.0 x10^3/uL


(0.0-0.2)


 


Sodium Level


 


 


 


 143 mmol/L


(136-145)


 


Potassium Level


 


 


 


 3.3 mmol/L


(3.5-5.1)


 


Chloride Level


 


 


 


 111 mmol/L


()


 


Carbon Dioxide Level


 


 


 


 23 mmol/L


(21-32)


 


Anion Gap    9 (6-14) 


 


Blood Urea Nitrogen


 


 


 


 34 mg/dL


(7-20)


 


Creatinine


 


 


 


 0.7 mg/dL


(0.6-1.0)


 


Estimated GFR


(Cockcroft-Gault) 


 


 


 88.9 





 


BUN/Creatinine Ratio    49 (6-20) 


 


Glucose Level


 


 


 


 125 mg/dL


(70-99)


 


Calcium Level


 


 


 


 8.8 mg/dL


(8.5-10.1)


 


Magnesium Level


 


 


 


 1.8 mg/dL


(1.8-2.4)


 


Total Bilirubin


 


 


 


 0.5 mg/dL


(0.2-1.0)


 


Aspartate Amino Transf


(AST/SGOT) 


 


 


 62 U/L (15-37) 





 


Alanine Aminotransferase


(ALT/SGPT) 


 


 


 72 U/L (14-59) 





 


Alkaline Phosphatase


 


 


 


 132 U/L


()


 


Total Protein


 


 


 


 4.5 g/dL


(6.4-8.2)


 


Albumin


 


 


 


 1.1 g/dL


(3.4-5.0)


 


Albumin/Globulin Ratio    0.3 (1.0-1.7) 


 


Test


 6/15/20


05:54 6/15/20


08:10 6/15/20


11:47 6/15/20


17:24


 


Glucose (Fingerstick)


 115 mg/dL


(70-99) 


 121 mg/dL


(70-99) 105 mg/dL


(70-99)


 


O2 Saturation  97 % (92-99)   


 


Arterial Blood pH


 


 7.46


(7.35-7.45) 


 





 


Arterial Blood pCO2 at


Patient Temp 


 31 mmHg


(35-46) 


 





 


Arterial Blood pO2 at Patient


Temp 


 117 mmHg


() 


 





 


Arterial Blood HCO3


 


 22 mmol/L


(21-28) 


 





 


Arterial Blood Base Excess


 


 -2 mmol/L


(-3-3) 


 





 


FiO2  35   


 


Test


 6/16/20


01:26 6/16/20


06:20 6/16/20


08:00 





 


Glucose (Fingerstick)


 113 mg/dL


(70-99) 122 mg/dL


(70-99) 


 





 


White Blood Count


 


 8.4 x10^3/uL


(4.0-11.0) 


 





 


Red Blood Count


 


 3.38 x10^6/uL


(3.50-5.40) 


 





 


Hemoglobin


 


 9.7 g/dL


(12.0-15.5) 


 





 


Hematocrit


 


 28.9 %


(36.0-47.0) 


 





 


Mean Corpuscular Volume  86 fL ()   


 


Mean Corpuscular Hemoglobin  29 pg (25-35)   


 


Mean Corpuscular Hemoglobin


Concent 


 34 g/dL


(31-37) 


 





 


Red Cell Distribution Width


 


 18.6 %


(11.5-14.5) 


 





 


Platelet Count


 


 432 x10^3/uL


(140-400) 


 





 


Neutrophils (%) (Auto)  79 % (31-73)   


 


Lymphocytes (%) (Auto)  16 % (24-48)   


 


Monocytes (%) (Auto)  4 % (0-9)   


 


Eosinophils (%) (Auto)  1 % (0-3)   


 


Basophils (%) (Auto)  0 % (0-3)   


 


Neutrophils # (Auto)


 


 6.6 x10^3/uL


(1.8-7.7) 


 





 


Lymphocytes # (Auto)


 


 1.3 x10^3/uL


(1.0-4.8) 


 





 


Monocytes # (Auto)


 


 0.3 x10^3/uL


(0.0-1.1) 


 





 


Eosinophils # (Auto)


 


 0.1 x10^3/uL


(0.0-0.7) 


 





 


Basophils # (Auto)


 


 0.0 x10^3/uL


(0.0-0.2) 


 





 


Sodium Level


 


 141 mmol/L


(136-145) 


 





 


Potassium Level


 


 3.6 mmol/L


(3.5-5.1) 


 





 


Chloride Level


 


 108 mmol/L


() 


 





 


Carbon Dioxide Level


 


 23 mmol/L


(21-32) 


 





 


Anion Gap  10 (6-14)   


 


Blood Urea Nitrogen


 


 28 mg/dL


(7-20) 


 





 


Creatinine


 


 0.7 mg/dL


(0.6-1.0) 


 





 


Estimated GFR


(Cockcroft-Gault) 


 88.9 


 


 





 


Glucose Level


 


 129 mg/dL


(70-99) 


 





 


Calcium Level


 


 8.9 mg/dL


(8.5-10.1) 


 





 


Magnesium Level


 


 1.5 mg/dL


(1.8-2.4) 


 





 


Triglycerides Level


 


 251 mg/dL


(0-150) 


 





 


O2 Saturation   98 % (92-99)  


 


Arterial Blood pH


 


 


 7.39


(7.35-7.45) 





 


Arterial Blood pCO2 at


Patient Temp 


 


 34 mmHg


(35-46) 





 


Arterial Blood pO2 at Patient


Temp 


 


 121 mmHg


() 





 


Arterial Blood HCO3


 


 


 20 mmol/L


(21-28) 





 


Arterial Blood Base Excess


 


 


 -4 mmol/L


(-3-3) 





 


FiO2   35%  








Laboratory Tests








Test


 6/15/20


11:47 6/15/20


17:24 6/16/20


01:26 6/16/20


06:20


 


Glucose (Fingerstick)


 121 mg/dL


(70-99) 105 mg/dL


(70-99) 113 mg/dL


(70-99) 122 mg/dL


(70-99)


 


White Blood Count


 


 


 


 8.4 x10^3/uL


(4.0-11.0)


 


Red Blood Count


 


 


 


 3.38 x10^6/uL


(3.50-5.40)


 


Hemoglobin


 


 


 


 9.7 g/dL


(12.0-15.5)


 


Hematocrit


 


 


 


 28.9 %


(36.0-47.0)


 


Mean Corpuscular Volume    86 fL () 


 


Mean Corpuscular Hemoglobin    29 pg (25-35) 


 


Mean Corpuscular Hemoglobin


Concent 


 


 


 34 g/dL


(31-37)


 


Red Cell Distribution Width


 


 


 


 18.6 %


(11.5-14.5)


 


Platelet Count


 


 


 


 432 x10^3/uL


(140-400)


 


Neutrophils (%) (Auto)    79 % (31-73) 


 


Lymphocytes (%) (Auto)    16 % (24-48) 


 


Monocytes (%) (Auto)    4 % (0-9) 


 


Eosinophils (%) (Auto)    1 % (0-3) 


 


Basophils (%) (Auto)    0 % (0-3) 


 


Neutrophils # (Auto)


 


 


 


 6.6 x10^3/uL


(1.8-7.7)


 


Lymphocytes # (Auto)


 


 


 


 1.3 x10^3/uL


(1.0-4.8)


 


Monocytes # (Auto)


 


 


 


 0.3 x10^3/uL


(0.0-1.1)


 


Eosinophils # (Auto)


 


 


 


 0.1 x10^3/uL


(0.0-0.7)


 


Basophils # (Auto)


 


 


 


 0.0 x10^3/uL


(0.0-0.2)


 


Sodium Level


 


 


 


 141 mmol/L


(136-145)


 


Potassium Level


 


 


 


 3.6 mmol/L


(3.5-5.1)


 


Chloride Level


 


 


 


 108 mmol/L


()


 


Carbon Dioxide Level


 


 


 


 23 mmol/L


(21-32)


 


Anion Gap    10 (6-14) 


 


Blood Urea Nitrogen


 


 


 


 28 mg/dL


(7-20)


 


Creatinine


 


 


 


 0.7 mg/dL


(0.6-1.0)


 


Estimated GFR


(Cockcroft-Gault) 


 


 


 88.9 





 


Glucose Level


 


 


 


 129 mg/dL


(70-99)


 


Calcium Level


 


 


 


 8.9 mg/dL


(8.5-10.1)


 


Magnesium Level


 


 


 


 1.5 mg/dL


(1.8-2.4)


 


Triglycerides Level


 


 


 


 251 mg/dL


(0-150)


 


Test


 6/16/20


08:00 


 


 





 


O2 Saturation 98 % (92-99)    


 


Arterial Blood pH


 7.39


(7.35-7.45) 


 


 





 


Arterial Blood pCO2 at


Patient Temp 34 mmHg


(35-46) 


 


 





 


Arterial Blood pO2 at Patient


Temp 121 mmHg


() 


 


 





 


Arterial Blood HCO3


 20 mmol/L


(21-28) 


 


 





 


Arterial Blood Base Excess


 -4 mmol/L


(-3-3) 


 


 





 


FiO2 35%    








Medications





Active Scripts








 Medications  Dose


 Route/Sig


 Max Daily Dose Days Date Category


 


 Bisoprolol


 Fumarate 5 Mg


 Tablet  10 Mg


 PO DAILY


   3/16/20 Reported








Comments


CXR 6/14/2020


 


trach  infilt effusion atelectasis on l side improved


r effusion/atelectasis














ct abdomen /pelvis 6/6


1. Removal of the percutaneous pigtail drainage catheters since the prior 


exam. Sequela of pancreatitis with extensive pseudocysts again 


demonstrated, the right-sided collections are slightly larger since the 


prior exam, the left-sided collections are stable. See above.


2. Moderate to large left pleural effusion with atelectasis and collapse 


of most of the left lower lobe, stable. Small right pleural effusion is 


stable.


3. Gallstone.





ct chest 6/15 reviewed





Impression


.


IMPRESSION:


1.  Acute hypoxemic respiratory failure secondary to ARDS status post trach, 

developed anemia 6/7, blood drainage from RLQ abdomen drain site, and 

surrounding firmness  / developed septic shock 6/7 from abdomen source, required

levo 6/7


s/p 3 new drains 6/7 with brown color drainage, back on vent 6/13, off vent 

since 6/15. on TS


2.  Gallstone pancreatitis, now with ongoing bleeding from prior drain. Anemic. 

s/p Tx multiple units over several days


3.  septic shock/sepsis, recurrent 6/7, source abdomen. , off levo now, 


4.  Acute kidney injury-, Off HD--renal function decling. suspect JED on CKD due

to hypotension , improved now


5.  Acute gallstone pancreatitis.


6.  Hypoalbuminemia.


7.  Moderate persistent effusions, s/p left thora  5/12, reaccumulation of left 

effusion. O2 requirement not changed. 


8.  Fever- ,hypotension. suspect recurrent sepsis/ likely pancreatic source.  

Per ID, per surgery--


9.  Chronic anemia-- ongoing / s/p PRBC


10. Covid 19 testing negative


11. Moderate to large ascites-S/P paracentisis


12.S/P paracentisis with 4 liters removed on 4/15/20


13. S/P IR drain placement on 5/8/2020, removal, re inserted 6/7


14. Depression/Anxiety 


15. Increase left effusion, ? loculated/ s/p left chest tube. 800 cc so far





Plan


.


1. TS 24 hr . follow results of  thoracentesis,  titrate fio2 to keep sat 94%, 

cont vent support. 


2. Follow all cultures/ PSA from pelvic drains. Abx per ID


3. Follow surgery recs-- Acute pancreatitis with persistent necrosis ---- 4/27 

status post ROBERT drain placement + C paropsilosis; 5/6 + yeast & high amylase; s/p

additional drains on 5/8, removal of drains and now increase pseudocyst and 

increase fluid collection. likely infected fluid/ sepsis. on BS abx.follow 

surgery rec, planning surgical intervention next few weeks


4. Follow ID recs for ABX----


5. Follow nephrology recs -----


6. Continue TPN 


7. monitor HGB,  4 units since 6/6--monitor HGB transfuse if less than 8.5 


8 s/p  thoracentesis, 5/12, 3 litres removed, 6/15. 800 cc so far. Monitor chest

tube


9. Pt remains critically ill from severe necrotic pancreatis. Even with surgery 

prognosis grim. Surgery informed family.





     


DVT/GI PPX: protonix--- holding lovenox 2/2 anemia


PT/OT


D/W RN, rt and dr huffman, 


d/w ID


critically ill    cc time 30 min no overlap











SHARYN SOLORZANO MD                 Jun 16, 2020 11:40

## 2020-06-16 NOTE — RAD
CT-guided placement of a left thoracostomy tube 6/16/2020  



Indication:   ct guided left thoracentesis, if thick fluid, chest tube



Discussion: The risks and benefits of the procedure, including but not limited

to, bleeding and infection were discussed patient. Informed consent was

obtained. The patient was brought to the CT scanner and placed in the supine

position. A timeout procedure was performed. CT imaging demonstrates a

moderate left pleural effusion. The overlying soft tissues were prepped and

draped using maximum sterile barrier technique. 1% lidocaine without

epinephrine was administered for local anesthesia. Under intermittent CT

guidance, a 5 Liberian Yueh needle was advanced into the left chest. Somewhat

thick pleural fluid was aspirated. A guidewire was advanced into the chest,

over which following dilatation a 10 Liberian pigtail drain was placed. This

connected to Pleur-evac device at -20 cm water. Sterile dressings were

applied.  

 



Impression: Left-sided chest tube placement, CT guided





PQRS Compliance Statement:



One or more of the following individualized dose reduction techniques were

utilized for this examination:

1. Automated exposure control

2. Adjustment of the mA and/or kV according to patient size

3. Use of iterative reconstruction technique

## 2020-06-16 NOTE — PDOC
Subjective:


Subjective:


Indicates feeling "so-so."





Objective:


Objective:


D/w nurse - less drainage.


Vital Signs:





                                   Vital Signs








  Date Time  Temp Pulse Resp B/P (MAP) Pulse Ox O2 Delivery O2 Flow Rate FiO2


 


6/16/20 10:00  124 24 124/60 (81) 99 Tracheal Collar 9.0 


 


6/16/20 08:00 97.7       





 97.7       








Labs:





Laboratory Tests








Test


 6/15/20


11:47 6/15/20


17:24 6/16/20


01:26 6/16/20


06:20


 


Glucose (Fingerstick)


 121 mg/dL


(70-99) 105 mg/dL


(70-99) 113 mg/dL


(70-99) 122 mg/dL


(70-99)








Imaging:


Thoracentesis 6/15





CT 6/15


IMPRESSION:


1.  Sequela of pancreatitis with extensive pseudocyst/fluid collections 

throughout the abdomen and pelvis including a large peripancreatic fluid 

collection and bilateral retroperitoneal collections involving the psoas mus

cles. Fluid collections are unchanged to minimally decreased from CT 6/6/2020. 

There are 3 new pigtail drainage catheters in place. IV or oral contrast may be 

useful to better evaluate, if possible.


2. Slightly increased moderate to large partially loculated left pleural 

effusion with atelectasis or consolidation of the left lower lobe. Slightly 

increased small right pleural effusion and mild right pulmonary opacities.


3. Mild right hydronephrosis.


4. Cholelithiasis.


5. New anasarca.





PE:





GEN: was asleep


LUNGS: trach collar


HEART: tachycardic


ABD: minimal dark brown output in drains, dressing over previous drain site to 

right dry


NEURO/PSYCH: awakens during exam





A/P:


Complicated pancreatitis





--


Continue support.





Justicifation of Admission Dx:


Justifications for Admission:


Justification of Admission Dx:  Yes











RAGHAVENDRA MURCIA         Jun 16, 2020 10:31

## 2020-06-17 VITALS — DIASTOLIC BLOOD PRESSURE: 52 MMHG | SYSTOLIC BLOOD PRESSURE: 106 MMHG

## 2020-06-17 VITALS — DIASTOLIC BLOOD PRESSURE: 54 MMHG | SYSTOLIC BLOOD PRESSURE: 116 MMHG

## 2020-06-17 VITALS — DIASTOLIC BLOOD PRESSURE: 66 MMHG | SYSTOLIC BLOOD PRESSURE: 132 MMHG

## 2020-06-17 VITALS — DIASTOLIC BLOOD PRESSURE: 54 MMHG | SYSTOLIC BLOOD PRESSURE: 109 MMHG

## 2020-06-17 VITALS — DIASTOLIC BLOOD PRESSURE: 64 MMHG | SYSTOLIC BLOOD PRESSURE: 110 MMHG

## 2020-06-17 VITALS — SYSTOLIC BLOOD PRESSURE: 156 MMHG | DIASTOLIC BLOOD PRESSURE: 70 MMHG

## 2020-06-17 VITALS — SYSTOLIC BLOOD PRESSURE: 147 MMHG | DIASTOLIC BLOOD PRESSURE: 61 MMHG

## 2020-06-17 VITALS — DIASTOLIC BLOOD PRESSURE: 58 MMHG | SYSTOLIC BLOOD PRESSURE: 108 MMHG

## 2020-06-17 VITALS — DIASTOLIC BLOOD PRESSURE: 58 MMHG | SYSTOLIC BLOOD PRESSURE: 136 MMHG

## 2020-06-17 VITALS — SYSTOLIC BLOOD PRESSURE: 137 MMHG | DIASTOLIC BLOOD PRESSURE: 58 MMHG

## 2020-06-17 VITALS — DIASTOLIC BLOOD PRESSURE: 64 MMHG | SYSTOLIC BLOOD PRESSURE: 178 MMHG

## 2020-06-17 VITALS — SYSTOLIC BLOOD PRESSURE: 158 MMHG | DIASTOLIC BLOOD PRESSURE: 70 MMHG

## 2020-06-17 VITALS — DIASTOLIC BLOOD PRESSURE: 58 MMHG | SYSTOLIC BLOOD PRESSURE: 122 MMHG

## 2020-06-17 VITALS — DIASTOLIC BLOOD PRESSURE: 52 MMHG | SYSTOLIC BLOOD PRESSURE: 112 MMHG

## 2020-06-17 VITALS — SYSTOLIC BLOOD PRESSURE: 124 MMHG | DIASTOLIC BLOOD PRESSURE: 67 MMHG

## 2020-06-17 VITALS — SYSTOLIC BLOOD PRESSURE: 125 MMHG | DIASTOLIC BLOOD PRESSURE: 62 MMHG

## 2020-06-17 VITALS — DIASTOLIC BLOOD PRESSURE: 54 MMHG | SYSTOLIC BLOOD PRESSURE: 108 MMHG

## 2020-06-17 VITALS — DIASTOLIC BLOOD PRESSURE: 58 MMHG | SYSTOLIC BLOOD PRESSURE: 143 MMHG

## 2020-06-17 VITALS — DIASTOLIC BLOOD PRESSURE: 60 MMHG | SYSTOLIC BLOOD PRESSURE: 132 MMHG

## 2020-06-17 VITALS — DIASTOLIC BLOOD PRESSURE: 52 MMHG | SYSTOLIC BLOOD PRESSURE: 124 MMHG

## 2020-06-17 VITALS — SYSTOLIC BLOOD PRESSURE: 120 MMHG | DIASTOLIC BLOOD PRESSURE: 54 MMHG

## 2020-06-17 VITALS — DIASTOLIC BLOOD PRESSURE: 52 MMHG | SYSTOLIC BLOOD PRESSURE: 108 MMHG

## 2020-06-17 VITALS — DIASTOLIC BLOOD PRESSURE: 68 MMHG | SYSTOLIC BLOOD PRESSURE: 143 MMHG

## 2020-06-17 VITALS — SYSTOLIC BLOOD PRESSURE: 153 MMHG | DIASTOLIC BLOOD PRESSURE: 70 MMHG

## 2020-06-17 LAB
ALBUMIN SERPL-MCNC: 1 G/DL (ref 3.4–5)
ALBUMIN/GLOB SERPL: 0.3 {RATIO} (ref 1–1.7)
ALP SERPL-CCNC: 110 U/L (ref 46–116)
ALT SERPL-CCNC: 27 U/L (ref 14–59)
ANION GAP SERPL CALC-SCNC: 9 MMOL/L (ref 6–14)
AST SERPL-CCNC: 18 U/L (ref 15–37)
BASE EXCESS ABG: -4 MMOL/L (ref -3–3)
BASOPHILS # BLD AUTO: 0 X10^3/UL (ref 0–0.2)
BASOPHILS NFR BLD: 0 % (ref 0–3)
BILIRUB SERPL-MCNC: 0.6 MG/DL (ref 0.2–1)
BUN SERPL-MCNC: 23 MG/DL (ref 7–20)
BUN/CREAT SERPL: 33 (ref 6–20)
CALCIUM SERPL-MCNC: 9 MG/DL (ref 8.5–10.1)
CHLORIDE SERPL-SCNC: 106 MMOL/L (ref 98–107)
CO2 SERPL-SCNC: 25 MMOL/L (ref 21–32)
CREAT SERPL-MCNC: 0.7 MG/DL (ref 0.6–1)
EOSINOPHIL NFR BLD: 0.1 X10^3/UL (ref 0–0.7)
EOSINOPHIL NFR BLD: 1 % (ref 0–3)
ERYTHROCYTE [DISTWIDTH] IN BLOOD BY AUTOMATED COUNT: 18.3 % (ref 11.5–14.5)
GFR SERPLBLD BASED ON 1.73 SQ M-ARVRAT: 88.9 ML/MIN
GLOBULIN SER-MCNC: 3.5 G/DL (ref 2.2–3.8)
GLUCOSE SERPL-MCNC: 121 MG/DL (ref 70–99)
HCO3 BLDA-SCNC: 19 MMOL/L (ref 21–28)
HCT VFR BLD CALC: 26.7 % (ref 36–47)
HGB BLD-MCNC: 8.9 G/DL (ref 12–15.5)
INSPIRATION SETTING TIME VENT: 35
LYMPHOCYTES # BLD: 1.7 X10^3/UL (ref 1–4.8)
LYMPHOCYTES NFR BLD AUTO: 22 % (ref 24–48)
MAGNESIUM SERPL-MCNC: 1.7 MG/DL (ref 1.8–2.4)
MCH RBC QN AUTO: 29 PG (ref 25–35)
MCHC RBC AUTO-ENTMCNC: 34 G/DL (ref 31–37)
MCV RBC AUTO: 85 FL (ref 79–100)
MONO #: 0.3 X10^3/UL (ref 0–1.1)
MONOCYTES NFR BLD: 4 % (ref 0–9)
NEUT #: 5.8 X10^3/UL (ref 1.8–7.7)
NEUTROPHILS NFR BLD AUTO: 73 % (ref 31–73)
PCO2 BLDA: 27 MMHG (ref 35–46)
PLATELET # BLD AUTO: 424 X10^3/UL (ref 140–400)
PO2 BLDA: 109 MMHG (ref 75–108)
POTASSIUM SERPL-SCNC: 3.4 MMOL/L (ref 3.5–5.1)
PROT SERPL-MCNC: 4.5 G/DL (ref 6.4–8.2)
RBC # BLD AUTO: 3.14 X10^6/UL (ref 3.5–5.4)
SAO2 % BLDA: 98 % (ref 92–99)
SODIUM SERPL-SCNC: 140 MMOL/L (ref 136–145)
WBC # BLD AUTO: 8 X10^3/UL (ref 4–11)

## 2020-06-17 RX ADMIN — IPRATROPIUM BROMIDE AND ALBUTEROL SULFATE SCH ML: .5; 3 SOLUTION RESPIRATORY (INHALATION) at 07:45

## 2020-06-17 RX ADMIN — ONDANSETRON PRN MG: 2 INJECTION INTRAMUSCULAR; INTRAVENOUS at 14:55

## 2020-06-17 RX ADMIN — INSULIN LISPRO SCH UNITS: 100 INJECTION, SOLUTION INTRAVENOUS; SUBCUTANEOUS at 18:36

## 2020-06-17 RX ADMIN — IPRATROPIUM BROMIDE AND ALBUTEROL SULFATE SCH ML: .5; 3 SOLUTION RESPIRATORY (INHALATION) at 20:00

## 2020-06-17 RX ADMIN — METOPROLOL TARTRATE PRN MG: 5 INJECTION INTRAVENOUS at 10:10

## 2020-06-17 RX ADMIN — INSULIN LISPRO SCH UNITS: 100 INJECTION, SOLUTION INTRAVENOUS; SUBCUTANEOUS at 00:00

## 2020-06-17 RX ADMIN — PROPOFOL PRN MLS/HR: 10 INJECTION, EMULSION INTRAVENOUS at 22:22

## 2020-06-17 RX ADMIN — DILTIAZEM HYDROCHLORIDE SCH MLS/HR: 5 INJECTION INTRAVENOUS at 08:23

## 2020-06-17 RX ADMIN — PANTOPRAZOLE SODIUM SCH MG: 40 INJECTION, POWDER, FOR SOLUTION INTRAVENOUS at 07:46

## 2020-06-17 RX ADMIN — ACETYLCYSTEINE SCH MG: 200 INHALANT RESPIRATORY (INHALATION) at 07:45

## 2020-06-17 RX ADMIN — PROPOFOL PRN MLS/HR: 10 INJECTION, EMULSION INTRAVENOUS at 04:31

## 2020-06-17 RX ADMIN — Medication PRN EACH: at 10:33

## 2020-06-17 RX ADMIN — IPRATROPIUM BROMIDE AND ALBUTEROL SULFATE SCH ML: .5; 3 SOLUTION RESPIRATORY (INHALATION) at 23:35

## 2020-06-17 RX ADMIN — ACETYLCYSTEINE SCH MG: 200 INHALANT RESPIRATORY (INHALATION) at 20:00

## 2020-06-17 RX ADMIN — INSULIN LISPRO SCH UNITS: 100 INJECTION, SOLUTION INTRAVENOUS; SUBCUTANEOUS at 12:00

## 2020-06-17 RX ADMIN — ONDANSETRON PRN MG: 2 INJECTION INTRAMUSCULAR; INTRAVENOUS at 14:15

## 2020-06-17 RX ADMIN — POTASSIUM CHLORIDE SCH MLS/HR: 200 INJECTION, SOLUTION INTRAVENOUS at 07:48

## 2020-06-17 RX ADMIN — INSULIN LISPRO SCH UNITS: 100 INJECTION, SOLUTION INTRAVENOUS; SUBCUTANEOUS at 06:00

## 2020-06-17 RX ADMIN — IPRATROPIUM BROMIDE AND ALBUTEROL SULFATE SCH ML: .5; 3 SOLUTION RESPIRATORY (INHALATION) at 15:30

## 2020-06-17 RX ADMIN — IPRATROPIUM BROMIDE AND ALBUTEROL SULFATE SCH ML: .5; 3 SOLUTION RESPIRATORY (INHALATION) at 11:59

## 2020-06-17 RX ADMIN — POTASSIUM CHLORIDE SCH MLS/HR: 200 INJECTION, SOLUTION INTRAVENOUS at 09:12

## 2020-06-17 RX ADMIN — ONDANSETRON PRN MG: 2 INJECTION INTRAMUSCULAR; INTRAVENOUS at 23:20

## 2020-06-17 RX ADMIN — DILTIAZEM HYDROCHLORIDE SCH MLS/HR: 5 INJECTION INTRAVENOUS at 10:41

## 2020-06-17 RX ADMIN — DILTIAZEM HYDROCHLORIDE SCH MLS/HR: 5 INJECTION INTRAVENOUS at 21:56

## 2020-06-17 RX ADMIN — IPRATROPIUM BROMIDE AND ALBUTEROL SULFATE SCH ML: .5; 3 SOLUTION RESPIRATORY (INHALATION) at 04:04

## 2020-06-17 NOTE — NUR
Pharmacy TPN Dosing Note



S: JESENIA BEAN is a 49 year old F Currently receiving Central Continuous TPN started 
03/18/20



B:Pertinent PMH: 

Necrotizing pancreatitis

Height: 5 feet, 8 inches

Weight: 84.0 kg



Current diet: NPO 



LABS:

Sodium:    140 

Potassium: 3.4 

Chloride:  106 

Calcium:   8.9 

Corrected Calcium: 11.30 

Magnesium: 1.5 

CO2:       25 

SCr:       0.7 

Glucose:   113, 129, 122 

Albumin:   1.0 

AST:       62 

ALT:       72 



TPN FORMULA:

TPN TYPE:  Central Continuous

AMINO ACIDS:         75 gm

DEXTROSE:            250 gm

LIPIDS:              20 gm

SODIUM CHLORIDE:     - mEq

SODIUM ACETATE:      - mEq

SODIUM PHOSPHATE:    - mmol

POTASSIUM CHLORIDE:  - mEq

POTASSIUM ACETATE:   60 mEq

POTASSIUM PHOSPHATE: - mmol

MAGNESIUM:           10 mEq

CALCIUM:             - mEq

INSULIN:             20 units

MULTIPLE VITAMIN:    10 ml

TRACE ELEMENTS:      1 ml(s)



TPN PLAN:  

INCREASE KACETATE TO 60 MEQ IN TPN





R: Continue TPN AT SAME RATE

Will monitor electrolytes, glucose, and tolerance to TPN.



 YENIFER STREETER [facility], 06/17/20 1043

## 2020-06-17 NOTE — RAD
Single view of the chest. 6/17/2020 6:07 AM

 

Indication: Reason: effusion / Spl. Instructions:  / History: 

 

Comparison: Chest radiograph June 14, 2020

 

Findings: There is a new left-sided thoracostomy tube. Left pleural 

effusion has decreased. Possible small residual left pleural effusion 

versus basal atelectasis is seen. There is a small to moderate right 

pleural effusion. Lung volumes are low. Cardiomediastinal silhouette is 

similar. Tracheostomy tube is in place. Enteric tube projects over the 

proximal stomach. There is a left upper extremity PICC line. There is no 

pneumothorax. There is no acute osseous change.

 

IMPRESSION: 

1.New left thoracostomy tube with decreased left pleural effusion.  

 

2. Otherwise similar radiographic appearance of the chest with low lung 

volumes and small to moderate right pleural effusion, and residual small 

left pleural effusion and basilar atelectasis.

 

Electronically signed by: Jerry Stratton MD (6/17/2020 12:26 PM) [id number]

## 2020-06-17 NOTE — NUR
RN notified by PT that patient was probably needing suctioned. RN seen patient coughing on 
ventilator and struggling. Patient HOB was elevated and pt was deep suctioned and shortly 
after patient had one episode of emesis which was small amount of green liquid. Patients 
oral cavity was suctioned with lots of green liquid fluids being suctioned out. Patient was 
nauseous and zofran was given. Around 5 minutes later patient was comfortable and sleeping 
while reporting no more feelings of needing to vomit. Patient remained elevated in bed and 
will continue to be monitored for anymore episodes.

## 2020-06-17 NOTE — NUR
TPA given through ROBERT drains per IR. Post TPA, the left ROBERT had brown fluid back, removed the 
same amount of fluid as infused. The middle ROBERT had brown fluid and had more fluid than 
infused. The right had 3 cc brown fluid back. Will monitor for more output and for bleeding.

## 2020-06-17 NOTE — PDOC
Infectious Disease Note


Subjective


Subjective


Patient better. A little nausea at times. pain ok


Continues to have blood stained drainage from drains


no fevers last 24 hours





ROS


ROS


difficult to obtain but o/w seems neg





Vital Sign


Vital Signs





Vital Signs








  Date Time  Temp Pulse Resp B/P (MAP) Pulse Ox O2 Delivery O2 Flow Rate FiO2


 


6/17/20 06:00  108 20 132/66 (88) 98 Ventilator  


 


6/17/20 04:00 98.4       





 98.4       


 


6/16/20 17:00       9.0 











Physical Exam


PHYSICAL EXAM


GENERAL: More Alert and looks comfortable


HEENT: NGT in place. Oral mucosa dry


NECK:  Tracheostomy 


LUNGS: Diminished aeration bases, no accessory muscle use - CT on left with 

clear fluid


HEART:  S1, S2, tachy, regular 


ABDOMEN: Mild distention, bowel sounds present, soft, grimaces to palpation 


Right lateral side drainage bag, 3 drains with bloodstained drainage.  Left side

with drainage from previous drain site - dressed - clean and dry


: [place] ( 6/ 7)


EXTREMITIES: Trace edema .no  cyanosis 


SKIN: no signs of gen rash 


CNS: Lethargic very weak


LUE-PICC (5/29) without signs of complications - art line without complications





Labs


Lab





Laboratory Tests








Test


 6/16/20


08:00 6/16/20


12:19 6/17/20


00:23 6/17/20


06:00


 


O2 Saturation 98 % (92-99)    


 


Arterial Blood pH


 7.39


(7.35-7.45) 


 


 





 


Arterial Blood pCO2 at


Patient Temp 34 mmHg


(35-46) 


 


 





 


Arterial Blood pO2 at Patient


Temp 121 mmHg


() 


 


 





 


Arterial Blood HCO3


 20 mmol/L


(21-28) 


 


 





 


Arterial Blood Base Excess


 -4 mmol/L


(-3-3) 


 


 





 


FiO2 35%    


 


Glucose (Fingerstick)


 


 145 mg/dL


(70-99) 95 mg/dL


(70-99) 





 


White Blood Count


 


 


 


 8.0 x10^3/uL


(4.0-11.0)


 


Red Blood Count


 


 


 


 3.14 x10^6/uL


(3.50-5.40)


 


Hemoglobin


 


 


 


 8.9 g/dL


(12.0-15.5)


 


Hematocrit


 


 


 


 26.7 %


(36.0-47.0)


 


Mean Corpuscular Volume    85 fL () 


 


Mean Corpuscular Hemoglobin    29 pg (25-35) 


 


Mean Corpuscular Hemoglobin


Concent 


 


 


 34 g/dL


(31-37)


 


Red Cell Distribution Width


 


 


 


 18.3 %


(11.5-14.5)


 


Platelet Count


 


 


 


 424 x10^3/uL


(140-400)


 


Neutrophils (%) (Auto)    73 % (31-73) 


 


Lymphocytes (%) (Auto)    22 % (24-48) 


 


Monocytes (%) (Auto)    4 % (0-9) 


 


Eosinophils (%) (Auto)    1 % (0-3) 


 


Basophils (%) (Auto)    0 % (0-3) 


 


Neutrophils # (Auto)


 


 


 


 5.8 x10^3/uL


(1.8-7.7)


 


Lymphocytes # (Auto)


 


 


 


 1.7 x10^3/uL


(1.0-4.8)


 


Monocytes # (Auto)


 


 


 


 0.3 x10^3/uL


(0.0-1.1)


 


Eosinophils # (Auto)


 


 


 


 0.1 x10^3/uL


(0.0-0.7)


 


Basophils # (Auto)


 


 


 


 0.0 x10^3/uL


(0.0-0.2)


 


Sodium Level


 


 


 


 140 mmol/L


(136-145)


 


Potassium Level


 


 


 


 3.4 mmol/L


(3.5-5.1)


 


Chloride Level


 


 


 


 106 mmol/L


()


 


Carbon Dioxide Level


 


 


 


 25 mmol/L


(21-32)


 


Anion Gap    9 (6-14) 


 


Blood Urea Nitrogen


 


 


 


 23 mg/dL


(7-20)


 


Creatinine


 


 


 


 0.7 mg/dL


(0.6-1.0)


 


Estimated GFR


(Cockcroft-Gault) 


 


 


 88.9 





 


BUN/Creatinine Ratio    33 (6-20) 


 


Glucose Level


 


 


 


 121 mg/dL


(70-99)


 


Calcium Level


 


 


 


 9.0 mg/dL


(8.5-10.1)


 


Magnesium Level


 


 


 


 1.7 mg/dL


(1.8-2.4)


 


Total Bilirubin


 


 


 


 0.6 mg/dL


(0.2-1.0)


 


Aspartate Amino Transf


(AST/SGOT) 


 


 


 18 U/L (15-37) 





 


Alanine Aminotransferase


(ALT/SGPT) 


 


 


 27 U/L (14-59) 





 


Alkaline Phosphatase


 


 


 


 110 U/L


()


 


Total Protein


 


 


 


 4.5 g/dL


(6.4-8.2)


 


Albumin


 


 


 


 1.0 g/dL


(3.4-5.0)


 


Albumin/Globulin Ratio    0.3 (1.0-1.7) 


 


Test


 6/17/20


06:13 


 


 





 


Glucose (Fingerstick)


 108 mg/dL


(70-99) 


 


 











Micro


6/7 





GRAM STAIN  Final  


        Final





        GRAM NEGATIVE RODS:MODERATE


        SQUAMOUS EPI CELL:NOT APPLICABLE


        PMN (WBCs):RARE


        YEAST:MODERATE


        Unless otherwise specified, Testing Performed by:


        [address] [id number]


        For Inquires, the Physician may contact the Microbiology


        department at [phone]





  ANAEROBIC-AEROBIC CULTURE  Preliminary  


        Preliminary





        MANY GRAM NEGATIVE RODS on 06/09/20 at 1158


        FINAL ID= [PSEUDOMONAS AERUGINOSA]


        PSEUDOMONAS AERUGINOSA





  ANTIMICROBIAL SUSCEPTIBILITY  Preliminary  


        Comment





        NEG ANSON 56


        PSEUDOMONAS AERUGINOSA


        ANTIBIOTIC                        RESULT          INTERPRETATION


        AMIKACIN                          <=16                  S


        AZTREONAM                         >16                   R


        CEFTAZIDIME                       >16                   R


        CIPROFLOXACIN                     <=0.25                S


        CEFEPIME                          16                    I


        CEFTAZIDIME/AVIBACTAM             <=4                   S


        GENTAMICIN                        <=2                   S














                               ** CONTINUED ON NEXT PAGE **





--------------------------

------------------------------------------------------------------





RUN DATE: 06/11/20                  [facility] *LIVE*               

  PAGE 2   


RUN TIME: 1016                            Specimen Inquiry                    


----------------

----------------------------------------------------------------------------





SPEC: [id number]    PATIENT: FRANCHESCA BEANALYX TA                [id number]  

(Continued)


 

--------------------------------------------------------------------------------


------------








 

--------------------------------------------------------------------------------


------------





  Procedure                         Result                                      

         


-----------------------------------------------------------------

---------------------------





  ANTIMICROBIAL SUSCEPTIBILITY  Preliminary   (continued)


        LEVOFLOXACIN                      <=0.5                 S


        MEROPENEM                         <=1                   S


        PIPERACILLIN/TAZOBACTAM           64                    S


        TOBRAMYCIN                        <=2                   S


        Unless otherwise specified, Testing Performed by:


        [address] [id number]


        For Inquires, the Physician may contact the Microbiology


        department at [phone]











CT Scan 6/6





IMPRESSION:


1. Removal of the percutaneous pigtail drainage catheters since the prior 


exam. Sequela of pancreatitis with extensive pseudocysts again 


demonstrated, the right-sided collections are slightly larger since the 


prior exam, the left-sided collections are stable. See above.


2. Moderate to large left pleural effusion with atelectasis and collapse 


of most of the left lower lobe, stable. Small right pleural effusion is 


stable.


3. Gallstone.





Objective


Assessment


Patient with prolonged hospitalization


Multiple medical problems


Multiple surgical procedures





S/p CT on left 6/15 890 ml overnight


6/7 fluid cult PSAE (MDRO),yeast moderate s/p drain times three R Cefepime, 

Zosyn ANSON < 64


CXR with Left lung white-out 6/13 ? effusion PSA 6/15 in sputum I Meropenem


Acute hypoxic resp failure on 35 % and 5 PEEP


Transaminitis - mild


Fever improving


Acute pancreatitis with persistent  necrosis


CT a/p 4/9


    Increased ascites. Persistent evidence of necrotizing pancreatitis with 

fluid and phlegmon


   at the pancreas


   4/27 status post ROBERT drain placement; yeast


   5/6 fluid  candida parapsilosis fluid amylase high


CT 6/7


IMPRESSION:


1.   Sequela of pancreatitis with extensive pseudocysts again 


demonstrated, the right-sided collections are slightly larger since the 


prior exam, the left-sided collections are stable. 








Cholelithiasis with thickening of the gallbladder wall.


Leucocytosis - better


JED,Hyperkalemia, Metabolic acidosis off dialysis


Acute hypoxic resp failure ,bilateral pleural effusion and atelectasis


hypocalcemia 


Prediabetes


HTN


s/p trach





S/p thoracenteis 5/12





Plan


Plan of Care











Consider d/c art- line placed 6/7


Sputum from 6/13 - growing PSA - may be colonization I to Meropenem and 

clinically stable from resp standpoint.  Will try to not treat so as to conserve

abx and avoid resistance but if worsens will add Cipro


Continue meropenem, has MDRP PSAE June 7 from abd


cont micafungin June 7


Follow-up cultures fluid for yeast


IR eval abd drains


Monitor a.m. labs - CMP/CBC


General surgery following


Monitor drain output


Maintain aspiration precaution


Supportive care





[facility] micro [phone]








Critically ill








D/w nursing











RASHAWN ROSEN MD              Jun 17, 2020 07:19

## 2020-06-17 NOTE — PDOC
Objective:


Objective:


D/w nurse - minimal drainage, IR to eval drains soon.


Vital Signs:





                                   Vital Signs








  Date Time  Temp Pulse Resp B/P (MAP) Pulse Ox O2 Delivery O2 Flow Rate FiO2


 


6/17/20 10:10  132  186/69    


 


6/17/20 10:07     100 Trach Collar 40% FIO2  


 


6/17/20 09:00   20     


 


6/17/20 08:00 99.0       





 99.0       


 


6/16/20 17:00       9.0 








Labs:





Laboratory Tests








Test


 6/16/20


12:19 6/17/20


00:23 6/17/20


06:00 6/17/20


06:13


 


Glucose (Fingerstick) 145 mg/dL  95 mg/dL   108 mg/dL 


 


White Blood Count   8.0 x10^3/uL  


 


Red Blood Count   3.14 x10^6/uL  


 


Hemoglobin   8.9 g/dL  


 


Hematocrit   26.7 %  


 


Mean Corpuscular Volume   85 fL  


 


Mean Corpuscular Hemoglobin   29 pg  


 


Mean Corpuscular Hemoglobin


Concent 


 


 34 g/dL 


 





 


Red Cell Distribution Width   18.3 %  


 


Platelet Count   424 x10^3/uL  


 


Neutrophils (%) (Auto)   73 %  


 


Lymphocytes (%) (Auto)   22 %  


 


Monocytes (%) (Auto)   4 %  


 


Eosinophils (%) (Auto)   1 %  


 


Basophils (%) (Auto)   0 %  


 


Neutrophils # (Auto)   5.8 x10^3/uL  


 


Lymphocytes # (Auto)   1.7 x10^3/uL  


 


Monocytes # (Auto)   0.3 x10^3/uL  


 


Eosinophils # (Auto)   0.1 x10^3/uL  


 


Basophils # (Auto)   0.0 x10^3/uL  


 


Sodium Level   140 mmol/L  


 


Potassium Level   3.4 mmol/L  


 


Chloride Level   106 mmol/L  


 


Carbon Dioxide Level   25 mmol/L  


 


Anion Gap   9  


 


Blood Urea Nitrogen   23 mg/dL  


 


Creatinine   0.7 mg/dL  


 


Estimated GFR


(Cockcroft-Gault) 


 


 88.9 


 





 


BUN/Creatinine Ratio   33  


 


Glucose Level   121 mg/dL  


 


Calcium Level   9.0 mg/dL  


 


Magnesium Level   1.7 mg/dL  


 


Total Bilirubin   0.6 mg/dL  


 


Aspartate Amino Transf


(AST/SGOT) 


 


 18 U/L 


 





 


Alanine Aminotransferase


(ALT/SGPT) 


 


 27 U/L 


 





 


Alkaline Phosphatase   110 U/L  


 


Total Protein   4.5 g/dL  


 


Albumin   1.0 g/dL  


 


Albumin/Globulin Ratio   0.3  


 


Test


 6/17/20


07:45 


 


 





 


O2 Saturation 98 %    


 


Arterial Blood pH 7.47    


 


Arterial Blood pCO2 at


Patient Temp 27 mmHg 


 


 


 





 


Arterial Blood pO2 at Patient


Temp 109 mmHg 


 


 


 





 


Arterial Blood HCO3 19 mmol/L    


 


Arterial Blood Base Excess -4 mmol/L    


 


FiO2 35    








Imaging:


CXR 6/17





PE:





NEURO/PSYCH: sleeping, not awakened





A/P:


Complicated pancreatitis





--


Continue support.





Justicifation of Admission Dx:


Justifications for Admission:


Justification of Admission Dx:  Yes











RAGHAVENDRA MURCIA         Jun 17, 2020 10:38

## 2020-06-17 NOTE — NUR
SS following up with discharge planning. SS reviewed pt chart and discussed with pt RN. Pt 
is currently requiring vent at  and trach shield during the day. Pt has three drains and 
continues to have brown drainage. Pt now has chest tube and had thoracentesis on Monday, 
6/15/2020. Pt on IV Meropenem and Micafungin and TPN. Pt remains Full Code per family 
request. SS will continue to follow for discharge planning.

## 2020-06-17 NOTE — PDOC
PULMONARY PROGRESS NOTES


Subjective


back on vent / , no distress


Patient intubated on 3/23 , s/p trach 4/6, 


transferred back to ICU 6/5 for syncope with collapse


developed anemia, blood drainage from RLQ abdomen drain site, and surrounding 

firmness  / developed septic shock 6/7 from abdomen source, required levo 6/7


s/p 3 new drains 6/7 with brown color drainage


Vitals





Vital Signs








  Date Time  Temp Pulse Resp B/P (MAP) Pulse Ox O2 Delivery O2 Flow Rate FiO2


 


6/17/20 10:10  132  186/69    


 


6/17/20 10:07     100 Trach Collar 40% FIO2  


 


6/17/20 10:00   39     


 


6/17/20 08:00 99.0       





 99.0       


 


6/16/20 17:00       9.0 








Comments


awake,no soa


General:  Alert, No acute distress


HEENT:  Other (nc at perrl   nose clear    neck  trach site ok   no lab  no 

thyromegaly)


Lungs:  Other (diminshed in bases, Rhonci in LLL)


Cardiovascular:  S1, S2


Abdomen:  Soft, Non-tender, Other (bleeding from Sx. site RLQ and firmness)


Extremities:  Other (+1 BLE edema)


Skin:  Warm, Dry


Labs





Laboratory Tests








Test


 6/15/20


11:47 6/15/20


17:24 6/16/20


01:26 6/16/20


06:20


 


Glucose (Fingerstick)


 121 mg/dL


(70-99) 105 mg/dL


(70-99) 113 mg/dL


(70-99) 122 mg/dL


(70-99)


 


White Blood Count


 


 


 


 8.4 x10^3/uL


(4.0-11.0)


 


Red Blood Count


 


 


 


 3.38 x10^6/uL


(3.50-5.40)


 


Hemoglobin


 


 


 


 9.7 g/dL


(12.0-15.5)


 


Hematocrit


 


 


 


 28.9 %


(36.0-47.0)


 


Mean Corpuscular Volume    86 fL () 


 


Mean Corpuscular Hemoglobin    29 pg (25-35) 


 


Mean Corpuscular Hemoglobin


Concent 


 


 


 34 g/dL


(31-37)


 


Red Cell Distribution Width


 


 


 


 18.6 %


(11.5-14.5)


 


Platelet Count


 


 


 


 432 x10^3/uL


(140-400)


 


Neutrophils (%) (Auto)    79 % (31-73) 


 


Lymphocytes (%) (Auto)    16 % (24-48) 


 


Monocytes (%) (Auto)    4 % (0-9) 


 


Eosinophils (%) (Auto)    1 % (0-3) 


 


Basophils (%) (Auto)    0 % (0-3) 


 


Neutrophils # (Auto)


 


 


 


 6.6 x10^3/uL


(1.8-7.7)


 


Lymphocytes # (Auto)


 


 


 


 1.3 x10^3/uL


(1.0-4.8)


 


Monocytes # (Auto)


 


 


 


 0.3 x10^3/uL


(0.0-1.1)


 


Eosinophils # (Auto)


 


 


 


 0.1 x10^3/uL


(0.0-0.7)


 


Basophils # (Auto)


 


 


 


 0.0 x10^3/uL


(0.0-0.2)


 


Sodium Level


 


 


 


 141 mmol/L


(136-145)


 


Potassium Level


 


 


 


 3.6 mmol/L


(3.5-5.1)


 


Chloride Level


 


 


 


 108 mmol/L


()


 


Carbon Dioxide Level


 


 


 


 23 mmol/L


(21-32)


 


Anion Gap    10 (6-14) 


 


Blood Urea Nitrogen


 


 


 


 28 mg/dL


(7-20)


 


Creatinine


 


 


 


 0.7 mg/dL


(0.6-1.0)


 


Estimated GFR


(Cockcroft-Gault) 


 


 


 88.9 





 


Glucose Level


 


 


 


 129 mg/dL


(70-99)


 


Calcium Level


 


 


 


 8.9 mg/dL


(8.5-10.1)


 


Magnesium Level


 


 


 


 1.5 mg/dL


(1.8-2.4)


 


Triglycerides Level


 


 


 


 251 mg/dL


(0-150)


 


Test


 6/16/20


08:00 6/16/20


12:19 6/17/20


00:23 6/17/20


06:00


 


O2 Saturation 98 % (92-99)    


 


Arterial Blood pH


 7.39


(7.35-7.45) 


 


 





 


Arterial Blood pCO2 at


Patient Temp 34 mmHg


(35-46) 


 


 





 


Arterial Blood pO2 at Patient


Temp 121 mmHg


() 


 


 





 


Arterial Blood HCO3


 20 mmol/L


(21-28) 


 


 





 


Arterial Blood Base Excess


 -4 mmol/L


(-3-3) 


 


 





 


FiO2 35%    


 


Glucose (Fingerstick)


 


 145 mg/dL


(70-99) 95 mg/dL


(70-99) 





 


White Blood Count


 


 


 


 8.0 x10^3/uL


(4.0-11.0)


 


Red Blood Count


 


 


 


 3.14 x10^6/uL


(3.50-5.40)


 


Hemoglobin


 


 


 


 8.9 g/dL


(12.0-15.5)


 


Hematocrit


 


 


 


 26.7 %


(36.0-47.0)


 


Mean Corpuscular Volume    85 fL () 


 


Mean Corpuscular Hemoglobin    29 pg (25-35) 


 


Mean Corpuscular Hemoglobin


Concent 


 


 


 34 g/dL


(31-37)


 


Red Cell Distribution Width


 


 


 


 18.3 %


(11.5-14.5)


 


Platelet Count


 


 


 


 424 x10^3/uL


(140-400)


 


Neutrophils (%) (Auto)    73 % (31-73) 


 


Lymphocytes (%) (Auto)    22 % (24-48) 


 


Monocytes (%) (Auto)    4 % (0-9) 


 


Eosinophils (%) (Auto)    1 % (0-3) 


 


Basophils (%) (Auto)    0 % (0-3) 


 


Neutrophils # (Auto)


 


 


 


 5.8 x10^3/uL


(1.8-7.7)


 


Lymphocytes # (Auto)


 


 


 


 1.7 x10^3/uL


(1.0-4.8)


 


Monocytes # (Auto)


 


 


 


 0.3 x10^3/uL


(0.0-1.1)


 


Eosinophils # (Auto)


 


 


 


 0.1 x10^3/uL


(0.0-0.7)


 


Basophils # (Auto)


 


 


 


 0.0 x10^3/uL


(0.0-0.2)


 


Sodium Level


 


 


 


 140 mmol/L


(136-145)


 


Potassium Level


 


 


 


 3.4 mmol/L


(3.5-5.1)


 


Chloride Level


 


 


 


 106 mmol/L


()


 


Carbon Dioxide Level


 


 


 


 25 mmol/L


(21-32)


 


Anion Gap    9 (6-14) 


 


Blood Urea Nitrogen


 


 


 


 23 mg/dL


(7-20)


 


Creatinine


 


 


 


 0.7 mg/dL


(0.6-1.0)


 


Estimated GFR


(Cockcroft-Gault) 


 


 


 88.9 





 


BUN/Creatinine Ratio    33 (6-20) 


 


Glucose Level


 


 


 


 121 mg/dL


(70-99)


 


Calcium Level


 


 


 


 9.0 mg/dL


(8.5-10.1)


 


Magnesium Level


 


 


 


 1.7 mg/dL


(1.8-2.4)


 


Total Bilirubin


 


 


 


 0.6 mg/dL


(0.2-1.0)


 


Aspartate Amino Transf


(AST/SGOT) 


 


 


 18 U/L (15-37) 





 


Alanine Aminotransferase


(ALT/SGPT) 


 


 


 27 U/L (14-59) 





 


Alkaline Phosphatase


 


 


 


 110 U/L


()


 


Total Protein


 


 


 


 4.5 g/dL


(6.4-8.2)


 


Albumin


 


 


 


 1.0 g/dL


(3.4-5.0)


 


Albumin/Globulin Ratio    0.3 (1.0-1.7) 


 


Test


 6/17/20


06:13 6/17/20


07:45 


 





 


Glucose (Fingerstick)


 108 mg/dL


(70-99) 


 


 





 


O2 Saturation  98 % (92-99)   


 


Arterial Blood pH


 


 7.47


(7.35-7.45) 


 





 


Arterial Blood pCO2 at


Patient Temp 


 27 mmHg


(35-46) 


 





 


Arterial Blood pO2 at Patient


Temp 


 109 mmHg


() 


 





 


Arterial Blood HCO3


 


 19 mmol/L


(21-28) 


 





 


Arterial Blood Base Excess


 


 -4 mmol/L


(-3-3) 


 





 


FiO2  35   








Laboratory Tests








Test


 6/16/20


12:19 6/17/20


00:23 6/17/20


06:00 6/17/20


06:13


 


Glucose (Fingerstick)


 145 mg/dL


(70-99) 95 mg/dL


(70-99) 


 108 mg/dL


(70-99)


 


White Blood Count


 


 


 8.0 x10^3/uL


(4.0-11.0) 





 


Red Blood Count


 


 


 3.14 x10^6/uL


(3.50-5.40) 





 


Hemoglobin


 


 


 8.9 g/dL


(12.0-15.5) 





 


Hematocrit


 


 


 26.7 %


(36.0-47.0) 





 


Mean Corpuscular Volume   85 fL ()  


 


Mean Corpuscular Hemoglobin   29 pg (25-35)  


 


Mean Corpuscular Hemoglobin


Concent 


 


 34 g/dL


(31-37) 





 


Red Cell Distribution Width


 


 


 18.3 %


(11.5-14.5) 





 


Platelet Count


 


 


 424 x10^3/uL


(140-400) 





 


Neutrophils (%) (Auto)   73 % (31-73)  


 


Lymphocytes (%) (Auto)   22 % (24-48)  


 


Monocytes (%) (Auto)   4 % (0-9)  


 


Eosinophils (%) (Auto)   1 % (0-3)  


 


Basophils (%) (Auto)   0 % (0-3)  


 


Neutrophils # (Auto)


 


 


 5.8 x10^3/uL


(1.8-7.7) 





 


Lymphocytes # (Auto)


 


 


 1.7 x10^3/uL


(1.0-4.8) 





 


Monocytes # (Auto)


 


 


 0.3 x10^3/uL


(0.0-1.1) 





 


Eosinophils # (Auto)


 


 


 0.1 x10^3/uL


(0.0-0.7) 





 


Basophils # (Auto)


 


 


 0.0 x10^3/uL


(0.0-0.2) 





 


Sodium Level


 


 


 140 mmol/L


(136-145) 





 


Potassium Level


 


 


 3.4 mmol/L


(3.5-5.1) 





 


Chloride Level


 


 


 106 mmol/L


() 





 


Carbon Dioxide Level


 


 


 25 mmol/L


(21-32) 





 


Anion Gap   9 (6-14)  


 


Blood Urea Nitrogen


 


 


 23 mg/dL


(7-20) 





 


Creatinine


 


 


 0.7 mg/dL


(0.6-1.0) 





 


Estimated GFR


(Cockcroft-Gault) 


 


 88.9 


 





 


BUN/Creatinine Ratio   33 (6-20)  


 


Glucose Level


 


 


 121 mg/dL


(70-99) 





 


Calcium Level


 


 


 9.0 mg/dL


(8.5-10.1) 





 


Magnesium Level


 


 


 1.7 mg/dL


(1.8-2.4) 





 


Total Bilirubin


 


 


 0.6 mg/dL


(0.2-1.0) 





 


Aspartate Amino Transf


(AST/SGOT) 


 


 18 U/L (15-37) 


 





 


Alanine Aminotransferase


(ALT/SGPT) 


 


 27 U/L (14-59) 


 





 


Alkaline Phosphatase


 


 


 110 U/L


() 





 


Total Protein


 


 


 4.5 g/dL


(6.4-8.2) 





 


Albumin


 


 


 1.0 g/dL


(3.4-5.0) 





 


Albumin/Globulin Ratio   0.3 (1.0-1.7)  


 


Test


 6/17/20


07:45 


 


 





 


O2 Saturation 98 % (92-99)    


 


Arterial Blood pH


 7.47


(7.35-7.45) 


 


 





 


Arterial Blood pCO2 at


Patient Temp 27 mmHg


(35-46) 


 


 





 


Arterial Blood pO2 at Patient


Temp 109 mmHg


() 


 


 





 


Arterial Blood HCO3


 19 mmol/L


(21-28) 


 


 





 


Arterial Blood Base Excess


 -4 mmol/L


(-3-3) 


 


 





 


FiO2 35    








Medications





Active Scripts








 Medications  Dose


 Route/Sig


 Max Daily Dose Days Date Category


 


 Bisoprolol


 Fumarate 5 Mg


 Tablet  10 Mg


 PO DAILY


   3/16/20 Reported








Comments


CXR 6/17


 


increase right effusion














ct abdomen /pelvis 6/6


1. Removal of the percutaneous pigtail drainage catheters since the prior 


exam. Sequela of pancreatitis with extensive pseudocysts again 


demonstrated, the right-sided collections are slightly larger since the 


prior exam, the left-sided collections are stable. See above.


2. Moderate to large left pleural effusion with atelectasis and collapse 


of most of the left lower lobe, stable. Small right pleural effusion is 


stable.


3. Gallstone.





ct chest 6/15 reviewed





Impression


.


IMPRESSION:


1.  Acute hypoxemic respiratory failure secondary to ARDS status post trach, 

developed anemia 6/7, blood drainage from RLQ abdomen drain site, and 

surrounding firmness  / developed septic shock 6/7 from abdomen source, required

levo 6/7


s/p 3 new drains 6/7 with brown color drainage, back on vent 6/13, off vent 

since 6/15. back on vent this am


2.  Gallstone pancreatitis, now with ongoing bleeding from prior drain. Anemic. 

s/p Tx multiple units over several days


3.  septic shock/sepsis, recurrent 6/7, source abdomen. , off levo now, 


4.  Acute kidney injury-, Off HD--renal function decling. suspect JED on CKD due

to hypotension , improved now


5.  Acute gallstone pancreatitis.


6.  Hypoalbuminemia.


7.  Moderate persistent effusions, s/p left thora  5/12, reaccumulation of left 

effusion. O2 requirement not changed. 


8.  Fever- ,hypotension. suspect recurrent sepsis/ likely pancreatic source.  

Per ID, per surgery--


9.  Chronic anemia-- ongoing / s/p PRBC


10. Covid 19 testing negative


11. Moderate to large ascites-S/P paracentisis


12.S/P paracentisis with 4 liters removed on 4/15/20


13. S/P IR drain placement on 5/8/2020, removal, re inserted 6/7


14. Depression/Anxiety 


15. Increase left effusion, ? loculated/ s/p left chest tube.. increase right 

effusion





Plan


.


1. AC mode. follow results of  thoracentesis,  titrate fio2 to keep sat 94%, 

cont vent support. 


2. Follow all cultures/ PSA from pelvic drains. Abx per ID


3. Follow surgery recs-- Acute pancreatitis with persistent necrosis ---- 4/27 

status post ROBERT drain placement + C paropsilosis; 5/6 + yeast & high amylase; s/p

additional drains on 5/8, removal of drains and now increase pseudocyst and 

increase fluid collection. likely infected fluid/ sepsis. on BS abx.follow 

surgery rec, planning surgical intervention next few weeks


4. Follow ID recs for ABX----


5. Follow nephrology recs -----


6. Continue TPN 


7. monitor HGB,  4 units since 6/6--monitor HGB transfuse if less than 8.5 


8 s/p  thoracentesis, 5/12, 3 litres removed, 6/15. 800 cc so far. Monitor chest

tube


9. Pt remains critically ill from severe necrotic pancreatis. Even with surgery 

prognosis grim. Surgery informed family.





     


DVT/GI PPX: protonix--- holding lovenox 2/2 anemia


PT/OT


D/W RN, rt and


d/w ID


critically ill    cc time 30 min no overlap











SHARYN SOLORZANO MD                 Jun 17, 2020 11:06

## 2020-06-17 NOTE — PDOC
PROGRESS NOTES


Chief Complaint


Chief Complaint


impression =================











History of Present Illness


48yo F w/ PMHx HTN, prediabetes who presented the emergency room complaints of 

abdominal pain. Patient described off and on 3 days. She states is constant, 

described as a squeezing sensation in a band-like distribution. + nausea, 

vomiting.  She denies any fever or diarrhea.  Patient denies any abdominal 

surgical procedures.  She states is worse with movements, car ride.  Pain 

initially was upper abdomen however now pretty much generalized.  Last bowel 

movement was 3/15/2020. Nothing makes her pain better.  Patient denies any 

shortness of breath.  She does state the pain moves into her chest.  Denies any 

headache or visual changes.


Lipase 97956, , , Bilirubin 1.4.


CT abdomen confirms pancreatic inflammation, peripancreatic fluid and 

inflammatory changes around the pancreas consistent with pancreatitis. 

Cholelithiasis and 1.4cm uterine fibroid as well as possible left salpingitis. 

Admitted for further care





impression ============================





Acute hypoxic Respiratory failure required  mechanical ventilation


Tracheostomy


bilateral pleural effusions/pulm edema s/p Throacentesis on 6/15/2020


Severe Acute gallstone pancreatitis (not a surgical candidate at this time) with

necrosis


Acute kidney failure now requiring dialysis


Gallstones (Calculus of gallbladder with acute cholecystitis without 

obstruction)


HTN 


Intractable pain


Intractable nausea


Covid 19 negative. 


Acute on chronic anemia 


EEG: No seizure activityFever  - better currently - intermittent could be from 

underlying pancreatitis blood cults 5/4 - neg so far


? Ileus with vomiting


Abd distention - U/S and CT reviewed s/p 0.4 L of opaque, debris-containing 

ascites was removed 5/6


Acute pancreatitis with persistent necrosis


- 4/27 status post ROBERT drain placement + C paropsilosis. s/p additional drains 

5/8


Anemia - S/p PRBCs


Cholelithiasis with thickening of the gallbladder wall.


Leucocytosis improving


JED, hyperkalemia, Metabolic acidosis off dialysis


hypocalcemia 


Prediabetes


HTN


s/p trach


ESRD on HD


Hyperglycemia





FEN - 


PPX - SCDs, off lovenox currently, high risk for thrombosis but in light of her 

recent anemia and need for transfusion the risks do not outweigh benefits at 

this time. will continue to evaluate on a daily basis


FULL CODE


Dispo - ICU, critically ill


BACK ON VENT 6/17  vent // no distress





History of Present Illness


History of Present Illness


6/8: IR placed drain on 6/7. 4u PRBC after Hb drop. Hb 8.8 today. Off Levophed 

this morning. T-max 100.3. Much more lethargic today. CXR with left sided 

diffuse infiltrates.


6/9: Tachycardic overnight into the 140s. NGT clamped. On BIPAP currently. 

Drains with serosanguinous discharge. WBC 8, Tmax 99.6F.


6/10: Seen on trach shield in ICU.  Hypertensive and tachycardic. Labs stable. 

blood stained drainage from drains. Afebrile.


6/11: Seen on trach shield in ICU. She is a bit confused, drowsy, but when 

sitting up is conversational and confusion somewhat clears. She is asking for 

more pain medication. Stable drains, still very tachy.Na 147


6/12: Patient vomited overnight. Aspirated. Tried to pull her trach out, she was

told she would die without her trach, she said "I know, I just want to go home".

Hb 7.6. Afebrile, still very tachycardic. 1055ml out of right sided ROBERT drain


6/13: Overnight hypoxic, on BIPAP. CXR with left sided white out lung. 

Significant mucous plug suctioned by RT with improvement in her ABG after 2 

hours this morning. Not really active, tired, lethargic. 890ml out of drains 

past 24 hours. On vent. D/w daughter bedside.


6/14: Still on vent overnight with copious pulmonary drainage on suctioning. Lab

s stable, UOP stable 1L. Drain output still significant with 1505mL. She has 

shown her phone password [phone] and made it clear she does not want her daughters 

to access her phone at this time.


6:15: Patient quite frail, she has had such a lengthy hospital stay that she is 

certainly depressed and as documented 3 days ago is wanting to go home. Most 

likely patient is also tired of being hospitalized with an end point no where in

sight. Reassurance and encouragement provided during my visit. Plans for 

thoracentesis later in the day 


6/16: No acute events reported overnight, case discussed with nursing staff 

patient in no acute distress, complaints of the abdomimal pain during my visit, 

patient is quite depressed, reassurance has been provided, discussed with 

nursing staff at bedside, replace electrolytes 


6/17 off vent / on TS , no distress








6/17 /2020


Patient seen and examined ICU BED


critically ill 


concerned about her long-term prognosis 


Sputum from 6/13 - growing PSA - may be colonization I to Meropenem add Cipro


Continue meropenem, has MDRP PSAE June 7 from abd


cont micafungin June 7





33 MIN CC TIME








Date:  Apr 27, 2020





Pre-Op Diagnosis:


Necrotizing pancreatitis





Post-Op Diagnosis:


same





Procedure Performed:


laparoscopic exploration





Surgeon:


Frandy Flores


Asst:  Dr. Lusi Santos





Anesthesia Type:


GETA plus local





Blood Loss:


50





Specimans Obtained:


cultures, debris





Findings:


1000 cc ascites suctioned off, cultures sent, diffuse debris, obliteration of 

surgical planes preventing any meaningful exploration























6/4/2020


Patient seen and examined in the ICU


She remains critically ill is is extremely weak


Chart reviewed


Discussed with RN


We decided to try some Prozac as she does seem depressed


On IV TPN


Still has NG tube








6/3/2020


Patient seen and examined in the ICU


She remains critically ill


We have been trying to hold off on her Ativan for the past 24 hours


She is a little more awake but shaky and agitated and anxious seems depressed


Discussed with RN


Chart reviewed








6/2/2020


Patient seen and examined in the ICU


She is a little more alert today but still quite ill


Appears clammy and pale and depressed/anxious


Discussed with RN


Chart reviewed











6/1/2020


Patient seen and examined in the ICU


She appears extremely ill


She is tachypneic at 35 respirations per minute and tachycardic at 132 bpm


She is extremely encephalopathic and shaky


She appears clammy


Chart reviewed


Discussed with RN


Prognosis extremely guarded at best








5/31/2020


Patient still in ICU


Resting with no apparent distress


Chart reviewed








5/30/2020


Patient seen and examined in the ICU


She is wiping her face with a cough


Discussed with RN


Chart reviewed


We hope to get her out of the ICU later today if possible








5/29/2020


Patient seen and examined in the ICU once again


She is back on NG suction


On IV Zosyn


Has IV TPN


Sedated with Precedex but anxious still


Appears somewhat clammy and pale


Chart reviewed


Discussed with RN


She remains critically ill











 


 


BRIEF OPERATIVE NOTE


Pre-Op Diagnosis


Pancreatitis with pseudocysts, suspected infection


Post-Op Diagnosis


same


Procedure Performed


CT abdominal Drains x 3


Surgeon


Hardy


Anesthesia Type:  Conscious Sedation


Findings


3 abdominal drains, 14F, with turbid pancreatic fluid and necrotic debris in 

each.


Complications


No immediate








5/9: Patient today somewhat restless and having bilious secretions from ET tube,

imaging studies ordered, discussed with consultant. Pretty poor prognosis, 

hopefully is not a fistula, poor surgical candidate. 





5/10: Imaging with no acute events, she seems more stable today compared to 

yesterday. Encouraged as much activity as possible patient at high risk for 

severe depression.





Vitals


Vitals





Vital Signs








  Date Time  Temp Pulse Resp B/P (MAP) Pulse Ox O2 Delivery O2 Flow Rate FiO2


 


6/17/20 09:00  104 20 153/70 (97) 98 Ventilator  


 


6/17/20 08:00 99.0       





 99.0       


 


6/16/20 17:00       9.0 











Physical Exam


Physical Exam











off vent / on TS , no distress


GENERAL: More Alert and looks comfortable


HEENT: NGT in place. Oral mucosa dry


NECK:  Tracheostomy 


LUNGS: Diminished aeration bases, no accessory muscle use - CT on left with 

clear fluid


HEART:  S1, S2, tachy, regular 


ABDOMEN: Mild distention, bowel sounds present, soft, grimaces to palpation 


Right lateral side drainage bag, 3 drains with bloodstained drainage.  Left side

with drainage from previous drain site - dressed - clean and dry


: [place] ( 6/ 7)


EXTREMITIES: Trace edema .no  cyanosis 


SKIN: no signs of gen rash 


CNS: Lethargic very weak


LUE-PICC (5/29) without signs of complications - art line without complications


General:  Oriented X3, Cooperative, No acute distress


Heart:  Regular rate, Normal S1, Normal S2, No murmurs, Gallops


Lungs:  Other (diminshed in bases, Rhonci in LLL)


Abdomen:  Soft, Other (drains in place)


Extremities:  No clubbing, No cyanosis, No edema, Normal pulses, No 

tenderness/swelling


Skin:  Other (warm, dry)





Labs


LABS





GRAM STAIN  Final  


        Final





        GRAM NEGATIVE RODS:MODERATE


        SQUAMOUS EPI CELL:NOT APPLICABLE


        PMN (WBCs):RARE


        YEAST:MODERATE


        Unless otherwise specified, Testing Performed by:


        [address] [place] [address]


        For Inquires, the Physician may contact the Microbiology


        department at [phone]





  ANAEROBIC-AEROBIC CULTURE  Preliminary  


        Preliminary





        MANY GRAM NEGATIVE RODS on 06/09/20 at 1158


        FINAL ID= [[email or web address]]


        PSEUDOMONAS AERUGINOSA





  ANTIMICROBIAL SUSCEPTIBILITY  Preliminary  


        Comment





        NEG ANSON 56


        PSEUDOMONAS AERUGINOSA


        ANTIBIOTIC                        RESULT          INTERPRETATION


        AMIKACIN                          <=16                  S


        AZTREONAM                         >16                   R


        CEFTAZIDIME                       >16                   R


        CIPROFLOXACIN                     <=0.25                S


        CEFEPIME                          16                    I


        CEFTAZIDIME/AVIBACTAM             <=4                   S


        GENTAMICIN                        <=2                   S





Laboratory Tests








Test


 6/16/20


12:19 6/17/20


00:23 6/17/20


06:00 6/17/20


06:13


 


Glucose (Fingerstick)


 145 mg/dL


(70-99) 95 mg/dL


(70-99) 


 108 mg/dL


(70-99)


 


White Blood Count


 


 


 8.0 x10^3/uL


(4.0-11.0) 





 


Red Blood Count


 


 


 3.14 x10^6/uL


(3.50-5.40) 





 


Hemoglobin


 


 


 8.9 g/dL


(12.0-15.5) 





 


Hematocrit


 


 


 26.7 %


(36.0-47.0) 





 


Mean Corpuscular Volume   85 fL ()  


 


Mean Corpuscular Hemoglobin   29 pg (25-35)  


 


Mean Corpuscular Hemoglobin


Concent 


 


 34 g/dL


(31-37) 





 


Red Cell Distribution Width


 


 


 18.3 %


(11.5-14.5) 





 


Platelet Count


 


 


 424 x10^3/uL


(140-400) 





 


Neutrophils (%) (Auto)   73 % (31-73)  


 


Lymphocytes (%) (Auto)   22 % (24-48)  


 


Monocytes (%) (Auto)   4 % (0-9)  


 


Eosinophils (%) (Auto)   1 % (0-3)  


 


Basophils (%) (Auto)   0 % (0-3)  


 


Neutrophils # (Auto)


 


 


 5.8 x10^3/uL


(1.8-7.7) 





 


Lymphocytes # (Auto)


 


 


 1.7 x10^3/uL


(1.0-4.8) 





 


Monocytes # (Auto)


 


 


 0.3 x10^3/uL


(0.0-1.1) 





 


Eosinophils # (Auto)


 


 


 0.1 x10^3/uL


(0.0-0.7) 





 


Basophils # (Auto)


 


 


 0.0 x10^3/uL


(0.0-0.2) 





 


Sodium Level


 


 


 140 mmol/L


(136-145) 





 


Potassium Level


 


 


 3.4 mmol/L


(3.5-5.1) 





 


Chloride Level


 


 


 106 mmol/L


() 





 


Carbon Dioxide Level


 


 


 25 mmol/L


(21-32) 





 


Anion Gap   9 (6-14)  


 


Blood Urea Nitrogen


 


 


 23 mg/dL


(7-20) 





 


Creatinine


 


 


 0.7 mg/dL


(0.6-1.0) 





 


Estimated GFR


(Cockcroft-Gault) 


 


 88.9 


 





 


BUN/Creatinine Ratio   33 (6-20)  


 


Glucose Level


 


 


 121 mg/dL


(70-99) 





 


Calcium Level


 


 


 9.0 mg/dL


(8.5-10.1) 





 


Magnesium Level


 


 


 1.7 mg/dL


(1.8-2.4) 





 


Total Bilirubin


 


 


 0.6 mg/dL


(0.2-1.0) 





 


Aspartate Amino Transf


(AST/SGOT) 


 


 18 U/L (15-37) 


 





 


Alanine Aminotransferase


(ALT/SGPT) 


 


 27 U/L (14-59) 


 





 


Alkaline Phosphatase


 


 


 110 U/L


() 





 


Total Protein


 


 


 4.5 g/dL


(6.4-8.2) 





 


Albumin


 


 


 1.0 g/dL


(3.4-5.0) 





 


Albumin/Globulin Ratio   0.3 (1.0-1.7)  


 


Test


 6/17/20


07:45 


 


 





 


O2 Saturation 98 % (92-99)    


 


Arterial Blood pH


 7.47


(7.35-7.45) 


 


 





 


Arterial Blood pCO2 at


Patient Temp 27 mmHg


(35-46) 


 


 





 


Arterial Blood pO2 at Patient


Temp 109 mmHg


() 


 


 





 


Arterial Blood HCO3


 19 mmol/L


(21-28) 


 


 





 


Arterial Blood Base Excess


 -4 mmol/L


(-3-3) 


 


 





 


FiO2 35    











Assessment and Plan


Assessmemt and Plan


Problems


Medical Problems:


(1) Acute pancreatitis


Status: Acute  





(2) Cholelithiasis


Status: Acute  








History of Present Illness


History of Present Illness


Ms Tadeo is a 48yo F w/ PMHx HTN, prediabetes who presents the emergency room

complaints of abdominal pain. Patient described off and on 3 days. She states is

constant, described as a squeezing sensation in a band-like distribution. + 

nausea, vomiting.  She denies any fever or diarrhea.  Patient denies any 

abdominal surgical procedures.  She states is worse with movements, car ride.  

Pain initially was upper abdomen however now pretty much generalized.  Last 

bowel movement was 3/15/2020. Nothing makes her pain better.  Patient denies any

shortness of breath.  She does state the pain moves into her chest.  Denies any 

headache or visual changes.


Lipase 65434, , , Bilirubin 1.4.


CT abdomen confirms pancreatic inflammation, peripancreatic fluid and 

inflammatory changes around the pancreas consistent with pancreatitis. 

Cholelithiasis and 1.4cm uterine fibroid as well as possible left salpingitis. 

Admitted for further care





Past Medical History


Cardiovascular:  HTN





Past Surgical History


Past Surgical History:  Other (Breast augmentation)





Family History


Family History:  High Cholestrol, Hypertension





Social History


Smoke:  No


ALCOHOL:  rare


Drugs:  None





Comment


Review of Relevant


I have reviewed the following items josy (where applicable) has been applied.


Labs





Laboratory Tests








Test


 6/15/20


11:47 6/15/20


17:24 6/16/20


01:26 6/16/20


06:20


 


Glucose (Fingerstick)


 121 mg/dL


(70-99) 105 mg/dL


(70-99) 113 mg/dL


(70-99) 122 mg/dL


(70-99)


 


White Blood Count


 


 


 


 8.4 x10^3/uL


(4.0-11.0)


 


Red Blood Count


 


 


 


 3.38 x10^6/uL


(3.50-5.40)


 


Hemoglobin


 


 


 


 9.7 g/dL


(12.0-15.5)


 


Hematocrit


 


 


 


 28.9 %


(36.0-47.0)


 


Mean Corpuscular Volume    86 fL () 


 


Mean Corpuscular Hemoglobin    29 pg (25-35) 


 


Mean Corpuscular Hemoglobin


Concent 


 


 


 34 g/dL


(31-37)


 


Red Cell Distribution Width


 


 


 


 18.6 %


(11.5-14.5)


 


Platelet Count


 


 


 


 432 x10^3/uL


(140-400)


 


Neutrophils (%) (Auto)    79 % (31-73) 


 


Lymphocytes (%) (Auto)    16 % (24-48) 


 


Monocytes (%) (Auto)    4 % (0-9) 


 


Eosinophils (%) (Auto)    1 % (0-3) 


 


Basophils (%) (Auto)    0 % (0-3) 


 


Neutrophils # (Auto)


 


 


 


 6.6 x10^3/uL


(1.8-7.7)


 


Lymphocytes # (Auto)


 


 


 


 1.3 x10^3/uL


(1.0-4.8)


 


Monocytes # (Auto)


 


 


 


 0.3 x10^3/uL


(0.0-1.1)


 


Eosinophils # (Auto)


 


 


 


 0.1 x10^3/uL


(0.0-0.7)


 


Basophils # (Auto)


 


 


 


 0.0 x10^3/uL


(0.0-0.2)


 


Sodium Level


 


 


 


 141 mmol/L


(136-145)


 


Potassium Level


 


 


 


 3.6 mmol/L


(3.5-5.1)


 


Chloride Level


 


 


 


 108 mmol/L


()


 


Carbon Dioxide Level


 


 


 


 23 mmol/L


(21-32)


 


Anion Gap    10 (6-14) 


 


Blood Urea Nitrogen


 


 


 


 28 mg/dL


(7-20)


 


Creatinine


 


 


 


 0.7 mg/dL


(0.6-1.0)


 


Estimated GFR


(Cockcroft-Gault) 


 


 


 88.9 





 


Glucose Level


 


 


 


 129 mg/dL


(70-99)


 


Calcium Level


 


 


 


 8.9 mg/dL


(8.5-10.1)


 


Magnesium Level


 


 


 


 1.5 mg/dL


(1.8-2.4)


 


Triglycerides Level


 


 


 


 251 mg/dL


(0-150)


 


Test


 6/16/20


08:00 6/16/20


12:19 6/17/20


00:23 6/17/20


06:00


 


O2 Saturation 98 % (92-99)    


 


Arterial Blood pH


 7.39


(7.35-7.45) 


 


 





 


Arterial Blood pCO2 at


Patient Temp 34 mmHg


(35-46) 


 


 





 


Arterial Blood pO2 at Patient


Temp 121 mmHg


() 


 


 





 


Arterial Blood HCO3


 20 mmol/L


(21-28) 


 


 





 


Arterial Blood Base Excess


 -4 mmol/L


(-3-3) 


 


 





 


FiO2 35%    


 


Glucose (Fingerstick)


 


 145 mg/dL


(70-99) 95 mg/dL


(70-99) 





 


White Blood Count


 


 


 


 8.0 x10^3/uL


(4.0-11.0)


 


Red Blood Count


 


 


 


 3.14 x10^6/uL


(3.50-5.40)


 


Hemoglobin


 


 


 


 8.9 g/dL


(12.0-15.5)


 


Hematocrit


 


 


 


 26.7 %


(36.0-47.0)


 


Mean Corpuscular Volume    85 fL () 


 


Mean Corpuscular Hemoglobin    29 pg (25-35) 


 


Mean Corpuscular Hemoglobin


Concent 


 


 


 34 g/dL


(31-37)


 


Red Cell Distribution Width


 


 


 


 18.3 %


(11.5-14.5)


 


Platelet Count


 


 


 


 424 x10^3/uL


(140-400)


 


Neutrophils (%) (Auto)    73 % (31-73) 


 


Lymphocytes (%) (Auto)    22 % (24-48) 


 


Monocytes (%) (Auto)    4 % (0-9) 


 


Eosinophils (%) (Auto)    1 % (0-3) 


 


Basophils (%) (Auto)    0 % (0-3) 


 


Neutrophils # (Auto)


 


 


 


 5.8 x10^3/uL


(1.8-7.7)


 


Lymphocytes # (Auto)


 


 


 


 1.7 x10^3/uL


(1.0-4.8)


 


Monocytes # (Auto)


 


 


 


 0.3 x10^3/uL


(0.0-1.1)


 


Eosinophils # (Auto)


 


 


 


 0.1 x10^3/uL


(0.0-0.7)


 


Basophils # (Auto)


 


 


 


 0.0 x10^3/uL


(0.0-0.2)


 


Sodium Level


 


 


 


 140 mmol/L


(136-145)


 


Potassium Level


 


 


 


 3.4 mmol/L


(3.5-5.1)


 


Chloride Level


 


 


 


 106 mmol/L


()


 


Carbon Dioxide Level


 


 


 


 25 mmol/L


(21-32)


 


Anion Gap    9 (6-14) 


 


Blood Urea Nitrogen


 


 


 


 23 mg/dL


(7-20)


 


Creatinine


 


 


 


 0.7 mg/dL


(0.6-1.0)


 


Estimated GFR


(Cockcroft-Gault) 


 


 


 88.9 





 


BUN/Creatinine Ratio    33 (6-20) 


 


Glucose Level


 


 


 


 121 mg/dL


(70-99)


 


Calcium Level


 


 


 


 9.0 mg/dL


(8.5-10.1)


 


Magnesium Level


 


 


 


 1.7 mg/dL


(1.8-2.4)


 


Total Bilirubin


 


 


 


 0.6 mg/dL


(0.2-1.0)


 


Aspartate Amino Transf


(AST/SGOT) 


 


 


 18 U/L (15-37) 





 


Alanine Aminotransferase


(ALT/SGPT) 


 


 


 27 U/L (14-59) 





 


Alkaline Phosphatase


 


 


 


 110 U/L


()


 


Total Protein


 


 


 


 4.5 g/dL


(6.4-8.2)


 


Albumin


 


 


 


 1.0 g/dL


(3.4-5.0)


 


Albumin/Globulin Ratio    0.3 (1.0-1.7) 


 


Test


 6/17/20


06:13 6/17/20


07:45 


 





 


Glucose (Fingerstick)


 108 mg/dL


(70-99) 


 


 





 


O2 Saturation  98 % (92-99)   


 


Arterial Blood pH


 


 7.47


(7.35-7.45) 


 





 


Arterial Blood pCO2 at


Patient Temp 


 27 mmHg


(35-46) 


 





 


Arterial Blood pO2 at Patient


Temp 


 109 mmHg


() 


 





 


Arterial Blood HCO3


 


 19 mmol/L


(21-28) 


 





 


Arterial Blood Base Excess


 


 -4 mmol/L


(-3-3) 


 





 


FiO2  35   








Laboratory Tests








Test


 6/16/20


12:19 6/17/20


00:23 6/17/20


06:00 6/17/20


06:13


 


Glucose (Fingerstick)


 145 mg/dL


(70-99) 95 mg/dL


(70-99) 


 108 mg/dL


(70-99)


 


White Blood Count


 


 


 8.0 x10^3/uL


(4.0-11.0) 





 


Red Blood Count


 


 


 3.14 x10^6/uL


(3.50-5.40) 





 


Hemoglobin


 


 


 8.9 g/dL


(12.0-15.5) 





 


Hematocrit


 


 


 26.7 %


(36.0-47.0) 





 


Mean Corpuscular Volume   85 fL ()  


 


Mean Corpuscular Hemoglobin   29 pg (25-35)  


 


Mean Corpuscular Hemoglobin


Concent 


 


 34 g/dL


(31-37) 





 


Red Cell Distribution Width


 


 


 18.3 %


(11.5-14.5) 





 


Platelet Count


 


 


 424 x10^3/uL


(140-400) 





 


Neutrophils (%) (Auto)   73 % (31-73)  


 


Lymphocytes (%) (Auto)   22 % (24-48)  


 


Monocytes (%) (Auto)   4 % (0-9)  


 


Eosinophils (%) (Auto)   1 % (0-3)  


 


Basophils (%) (Auto)   0 % (0-3)  


 


Neutrophils # (Auto)


 


 


 5.8 x10^3/uL


(1.8-7.7) 





 


Lymphocytes # (Auto)


 


 


 1.7 x10^3/uL


(1.0-4.8) 





 


Monocytes # (Auto)


 


 


 0.3 x10^3/uL


(0.0-1.1) 





 


Eosinophils # (Auto)


 


 


 0.1 x10^3/uL


(0.0-0.7) 





 


Basophils # (Auto)


 


 


 0.0 x10^3/uL


(0.0-0.2) 





 


Sodium Level


 


 


 140 mmol/L


(136-145) 





 


Potassium Level


 


 


 3.4 mmol/L


(3.5-5.1) 





 


Chloride Level


 


 


 106 mmol/L


() 





 


Carbon Dioxide Level


 


 


 25 mmol/L


(21-32) 





 


Anion Gap   9 (6-14)  


 


Blood Urea Nitrogen


 


 


 23 mg/dL


(7-20) 





 


Creatinine


 


 


 0.7 mg/dL


(0.6-1.0) 





 


Estimated GFR


(Cockcroft-Gault) 


 


 88.9 


 





 


BUN/Creatinine Ratio   33 (6-20)  


 


Glucose Level


 


 


 121 mg/dL


(70-99) 





 


Calcium Level


 


 


 9.0 mg/dL


(8.5-10.1) 





 


Magnesium Level


 


 


 1.7 mg/dL


(1.8-2.4) 





 


Total Bilirubin


 


 


 0.6 mg/dL


(0.2-1.0) 





 


Aspartate Amino Transf


(AST/SGOT) 


 


 18 U/L (15-37) 


 





 


Alanine Aminotransferase


(ALT/SGPT) 


 


 27 U/L (14-59) 


 





 


Alkaline Phosphatase


 


 


 110 U/L


() 





 


Total Protein


 


 


 4.5 g/dL


(6.4-8.2) 





 


Albumin


 


 


 1.0 g/dL


(3.4-5.0) 





 


Albumin/Globulin Ratio   0.3 (1.0-1.7)  


 


Test


 6/17/20


07:45 


 


 





 


O2 Saturation 98 % (92-99)    


 


Arterial Blood pH


 7.47


(7.35-7.45) 


 


 





 


Arterial Blood pCO2 at


Patient Temp 27 mmHg


(35-46) 


 


 





 


Arterial Blood pO2 at Patient


Temp 109 mmHg


() 


 


 





 


Arterial Blood HCO3


 19 mmol/L


(21-28) 


 


 





 


Arterial Blood Base Excess


 -4 mmol/L


(-3-3) 


 


 





 


FiO2 35    








Microbiology


6/15/20 Gram Stain - Final, Resulted


          


6/15/20 Aerobic and Anaerobic Culture - Preliminary, Resulted


          


6/13/20 Gram Stain Evaluation - Final, Complete


          


6/13/20 Respiratory Culture - Final, Complete


          


6/13/20 Antimicrobic Susceptibility - Final, Complete


          


6/7/20 Blood Culture - Final, Complete


         NO GROWTH AFTER 5 DAYS


6/7/20 Urine Culture - Final, Complete


         


5/30/20 Gram Stain - Final, Complete


          


5/30/20 Aerobic Culture - Final, Complete


Medications





Current Medications


Sodium Chloride 1,000 ml @  1,000 mls/hr Q1H IV  Last administered on 3/16/20at 

03:00;  Start 3/16/20 at 03:00;  Stop 3/16/20 at 03:59;  Status DC


Ondansetron HCl (Zofran) 4 mg 1X  ONCE IVP  Last administered on 3/16/20at 

03:27;  Start 3/16/20 at 03:00;  Stop 3/16/20 at 03:01;  Status DC


Morphine Sulfate (Morphine Sulfate) 4 mg 1X  ONCE IV ;  Start 3/16/20 at 03:00; 

Stop 3/16/20 at 03:01;  Status Cancel


Ketorolac Tromethamine (Toradol 30mg Vial) 30 mg 1X  ONCE IV  Last administered 

on 3/16/20at 02:54;  Start 3/16/20 at 03:00;  Stop 3/16/20 at 03:01;  Status DC


Fentanyl Citrate (Fentanyl 2ml Vial) 25 mcg 1X  ONCE IVP  Last administered on 

3/16/20at 03:23;  Start 3/16/20 at 03:30;  Stop 3/16/20 at 03:31;  Status DC


Fentanyl Citrate (Fentanyl 2ml Vial) 100 mcg STK-MED ONCE .ROUTE ;  Start 

3/16/20 at 03:18;  Stop 3/16/20 at 03:18;  Status DC


Iohexol (Omnipaque 350 Mg/ml) 90 ml 1X  ONCE IV  Last administered on 3/16/20at 

03:25;  Start 3/16/20 at 03:30;  Stop 3/16/20 at 03:31;  Status DC


Info (CONTRAST GIVEN -- Rx MONITORING) 1 each PRN DAILY  PRN MC SEE COMMENTS;  

Start 3/16/20 at 03:30;  Stop 3/18/20 at 03:29;  Status DC


Hydromorphone HCl (Dilaudid) 0.5 mg 1X  ONCE IV  Last administered on 3/16/20at 

03:55;  Start 3/16/20 at 04:30;  Stop 3/16/20 at 04:32;  Status DC


Ondansetron HCl (Zofran) 4 mg PRN Q8HRS  PRN IV NAUSEA/VOMITING 1ST CHOICE;  

Start 3/16/20 at 05:00;  Stop 3/16/20 at 09:27;  Status DC


Morphine Sulfate (Morphine Sulfate) 2 mg PRN Q2HR  PRN IV SEVERE PAIN 7-10 Last 

administered on 3/17/20at 12:26;  Start 3/16/20 at 05:00;  Stop 3/17/20 at 14:

15;  Status DC


Sodium Chloride 1,000 ml @  125 mls/hr Q8H IV  Last administered on 3/16/20at 

20:56;  Start 3/16/20 at 05:00;  Stop 3/17/20 at 04:59;  Status DC


Hydromorphone HCl (Dilaudid) 0.5 mg PRN Q3HRS  PRN IV SEVERE PAIN 7-10 Last 

administered on 3/17/20at 10:06;  Start 3/16/20 at 05:00;  Stop 3/17/20 at 

12:01;  Status DC


Piperacillin Sod/ Tazobactam Sod 4.5 gm/Sodium Chloride 100 ml @  200 mls/hr 1X 

ONCE IV  Last administered on 3/16/20at 05:44;  Start 3/16/20 at 06:00;  Stop 

3/16/20 at 06:29;  Status DC


Ondansetron HCl (Zofran) 4 mg PRN Q4HRS  PRN IV NAUSEA/VOMITING 1ST CHOICE Last 

administered on 6/16/20at 08:21;  Start 3/16/20 at 09:30


Insulin Human Lispro (HumaLOG) 0-9 UNITS Q6HRS SQ  Last administered on 

6/14/20at 12:21;  Start 3/16/20 at 09:30


Dextrose (Dextrose 50%-Water Syringe) 12.5 gm PRN Q15MIN  PRN IV SEE COMMENTS;  

Start 3/16/20 at 09:30


Pantoprazole Sodium (PROTONIX VIAL for IV PUSH) 40 mg DAILYAC IVP  Last 

administered on 6/17/20at 07:46;  Start 3/16/20 at 11:30


Prochlorperazine Edisylate (Compazine) 10 mg PRN Q6HRS  PRN IV NAUSEA/VOMITING, 

2nd CHOICE Last administered on 6/16/20at 15:49;  Start 3/16/20 at 17:45


Atenolol (Tenormin) 100 mg DAILY PO ;  Start 3/17/20 at 09:00;  Stop 3/16/20 at 

20:08;  Status DC


Metoprolol Tartrate (Lopressor Vial) 2.5 mg Q6HRS IVP  Last administered on 

3/17/20at 05:51;  Start 3/16/20 at 20:15;  Stop 3/17/20 at 10:02;  Status DC


Metoprolol Tartrate (Lopressor Vial) 5 mg Q6HRS IVP  Last administered on 

3/26/20at 00:12;  Start 3/17/20 at 10:15;  Stop 3/28/20 at 08:48;  Status DC


Hydromorphone HCl (Dilaudid) 1 mg PRN Q3HRS  PRN IV SEVERE PAIN 7-10 Last 

administered on 3/23/20at 05:13;  Start 3/17/20 at 12:00;  Stop 3/31/20 at 

00:25;  Status DC


Lidocaine HCl (Buffered Lidocaine 1%) 3 ml STK-MED ONCE .ROUTE ;  Start 3/17/20 

at 12:55;  Stop 3/17/20 at 12:56;  Status DC


Albumin Human 500 ml @  125 mls/hr 1X  ONCE IV  Last administered on 3/17/20at 

14:33;  Start 3/17/20 at 14:30;  Stop 3/17/20 at 18:32;  Status DC


Norepinephrine Bitartrate 8 mg/ Dextrose 258 ml @  17.299 mls/ hr CONT  PRN IV 

PER PROTOCOL Last administered on 4/14/20at 12:48;  Start 3/17/20 at 15:30;  

Stop 4/17/20 at 09:19;  Status DC


Sodium Chloride 1,000 ml @  125 mls/hr Q8H IV  Last administered on 3/17/20at 

21:04;  Start 3/17/20 at 16:00;  Stop 3/18/20 at 02:42;  Status DC


Albumin Human 500 ml @  125 mls/hr PRN BID  PRN IV After every 2L NSS & BP < 

90mm Last administered on 6/6/20at 11:40;  Start 3/17/20 at 16:00


Iohexol (Omnipaque 300 Mg/ml) 60 ml 1X  ONCE IV  Last administered on 3/17/20at 

17:20;  Start 3/17/20 at 17:00;  Stop 3/17/20 at 17:01;  Status DC


Info (CONTRAST GIVEN -- Rx MONITORING) 1 each PRN DAILY  PRN MC SEE COMMENTS;  

Start 3/17/20 at 17:00;  Stop 3/19/20 at 16:59;  Status DC


Meropenem 1 gm/ Sodium Chloride 100 ml @  200 mls/hr Q8HRS IV  Last administered

on 3/18/20at 05:45;  Start 3/17/20 at 20:00;  Stop 3/18/20 at 08:48;  Status DC


Furosemide (Lasix) 40 mg 1X  ONCE IVP  Last administered on 3/17/20at 22:12;  

Start 3/17/20 at 22:30;  Stop 3/17/20 at 22:31;  Status DC


Calcium Chloride 1000 mg/Sodium Chloride 110 ml @  220 mls/hr 1X  ONCE IV  Last 

administered on 3/17/20at 22:11;  Start 3/17/20 at 22:30;  Stop 3/17/20 at 

22:59;  Status DC


Albuterol Sulfate (Ventolin Neb Soln) 2.5 mg 1X  ONCE NEB  Last administered on 

3/18/20at 00:56;  Start 3/17/20 at 22:30;  Stop 3/17/20 at 22:31;  Status DC


Insulin Human Regular (HumuLIN R VIAL) 5 unit 1X  ONCE IV  Last administered on 

3/17/20at 22:14;  Start 3/17/20 at 22:30;  Stop 3/17/20 at 22:31;  Status DC


Magnesium Sulfate 50 ml @ 25 mls/hr 1X  ONCE IV  Last administered on 3/18/20at 

02:57;  Start 3/18/20 at 03:00;  Stop 3/18/20 at 04:59;  Status DC


Calcium Gluconate 1000 mg/Sodium Chloride 110 ml @  220 mls/hr 1X  ONCE IV  Last

administered on 3/18/20at 02:46;  Start 3/18/20 at 03:00;  Stop 3/18/20 at 

03:29;  Status DC


Sodium Chloride 1,000 ml @  200 mls/hr Q5H IV  Last administered on 3/18/20at 

02:46;  Start 3/18/20 at 03:00;  Stop 3/18/20 at 10:21;  Status DC


Calcium Gluconate 1000 mg/Sodium Chloride 110 ml @  220 mls/hr 1X  ONCE IV  Last

administered on 3/18/20at 03:21;  Start 3/18/20 at 03:30;  Stop 3/18/20 at 

03:59;  Status DC


Sodium Bicarbonate 50 meq/Sodium Chloride 1,050 ml @  75 mls/hr Q14H IV  Last 

administered on 3/22/20at 21:10;  Start 3/18/20 at 07:30;  Stop 3/23/20 at 

10:28;  Status DC


Calcium Gluconate 2000 mg/Sodium Chloride 120 ml @  220 mls/hr 1X  ONCE IV  Last

administered on 3/18/20at 09:05;  Start 3/18/20 at 07:30;  Stop 3/18/20 at 

08:02;  Status DC


Lidocaine HCl (Xylocaine-Mpf 1% 2ml Vial) 2 ml STK-MED ONCE .ROUTE ;  Start 

3/18/20 at 08:47;  Stop 3/18/20 at 08:47;  Status DC


Meropenem 500 mg/ Sodium Chloride 50 ml @  100 mls/hr Q12HR IV  Last 

administered on 3/23/20at 21:01;  Start 3/18/20 at 18:00;  Stop 3/24/20 at 

07:58;  Status DC


Lidocaine HCl (Buffered Lidocaine 1%) 3 ml STK-MED ONCE .ROUTE ;  Start 3/18/20 

at 09:46;  Stop 3/18/20 at 09:46;  Status DC


Lidocaine HCl (Buffered Lidocaine 1%) 6 ml 1X  ONCE INJ  Last administered on 

3/18/20at 10:26;  Start 3/18/20 at 10:15;  Stop 3/18/20 at 10:16;  Status DC


Info (Tpn Per Pharmacy) 1 each PRN DAILY  PRN MC SEE COMMENTS Last administered 

on 6/16/20at 10:11;  Start 3/18/20 at 12:00


Sodium Chloride 1,000 ml @  1,000 mls/hr Q1H PRN IV hypotension;  Start 3/18/20 

at 12:07;  Stop 3/18/20 at 18:06;  Status DC


Diphenhydramine HCl (Benadryl) 25 mg 1X PRN  PRN IV ITCHING;  Start 3/18/20 at 

12:15;  Stop 3/19/20 at 12:14;  Status DC


Diphenhydramine HCl (Benadryl) 25 mg 1X PRN  PRN IV ITCHING;  Start 3/18/20 at 

12:15;  Stop 3/19/20 at 12:14;  Status DC


Sodium Chloride 1,000 ml @  400 mls/hr Q2H30M PRN IV PATENCY;  Start 3/18/20 at 

12:07;  Stop 3/19/20 at 00:06;  Status DC


Info (PHARMACY MONITORING -- do not chart) 1 each PRN DAILY  PRN MC SEE 

COMMENTS;  Start 3/18/20 at 12:15;  Stop 3/20/20 at 08:13;  Status DC


Sodium Chloride 90 meq/Calcium Gluconate 10 meq/ Multivitamins 10 ml/Chromium/ 

Copper/Manganese/ Seleni/Zn 1 ml/ Total Parenteral Nutrition/Amino 

Acids/Dextrose/ Fat Emulsion Intravenous 55.005 ml  @ 2.292 mls/hr TPN  CONT IV 

;  Start 3/18/20 at 22:00;  Stop 3/18/20 at 12:33;  Status DC


Info (Tpn Per Pharmacy) 1 each PRN DAILY  PRN MC SEE COMMENTS;  Start 3/18/20 at

12:30;  Status UNV


Sodium Chloride 90 meq/Calcium Gluconate 10 meq/ Multivitamins 10 ml/Chromium/ 

Copper/Manganese/ Seleni/Zn 0.5 ml/ Total Parenteral Nutrition/Amino 

Acids/Dextrose/ Fat Emulsion Intravenous 1,512 ml @  63 mls/hr TPN  CONT IV  

Last administered on 3/18/20at 22:06;  Start 3/18/20 at 22:00;  Stop 3/19/20 at 

21:59;  Status DC


Calcium Carbonate/ Glycine (Tums) 500 mg PRN AFTMEALHC  PRN PO INDIGESTION;  

Start 3/18/20 at 17:45;  Stop 5/13/20 at 10:25;  Status DC


Calcium Gluconate (Calcium Gluconate) 2,000 mg 1X  ONCE IVP  Last administered 

on 3/19/20at 02:19;  Start 3/19/20 at 02:15;  Stop 3/19/20 at 02:16;  Status DC


Calcium Chloride 3000 mg/Sodium Chloride 1,030 ml @  50 mls/hr [id number] IV  Last 

administered on 3/21/20at 02:17;  Start 3/19/20 at 08:00;  Stop 3/21/20 at 

15:23;  Status DC


Lorazepam (Ativan Inj) 1 mg PRN Q4HRS  PRN IVP ANXIETY / AGITATION, 2nd choic 

Last administered on 4/17/20at 03:51;  Start 3/19/20 at 09:00;  Stop 4/17/20 at 

09:19;  Status DC


Sodium Chloride 1,000 ml @  1,000 mls/hr Q1H PRN IV hypotension;  Start 3/19/20 

at 08:56;  Stop 3/19/20 at 14:55;  Status DC


Albumin Human 200 ml @  200 mls/hr 1X PRN  PRN IV Hypotension;  Start 3/19/20 at

09:00;  Stop 3/19/20 at 14:59;  Status DC


Diphenhydramine HCl (Benadryl) 25 mg 1X PRN  PRN IV ITCHING;  Start 3/19/20 at 

09:00;  Stop 3/20/20 at 08:59;  Status DC


Diphenhydramine HCl (Benadryl) 25 mg 1X PRN  PRN IV ITCHING;  Start 3/19/20 at 

09:00;  Stop 3/20/20 at 08:59;  Status DC


Sodium Chloride 1,000 ml @  400 mls/hr Q2H30M PRN IV PATENCY;  Start 3/19/20 at 

08:56;  Stop 3/19/20 at 20:55;  Status DC


Info (PHARMACY MONITORING -- do not chart) 1 each PRN DAILY  PRN MC SEE 

COMMENTS;  Start 3/19/20 at 09:00;  Status UNV


Info (PHARMACY MONITORING -- do not chart) 1 each PRN DAILY  PRN MC SEE 

COMMENTS;  Start 3/19/20 at 09:00;  Stop 3/20/20 at 08:13;  Status DC


Digoxin (Lanoxin) 500 mcg 1X  ONCE IV  Last administered on 3/19/20at 10:04;  

Start 3/19/20 at 10:00;  Stop 3/19/20 at 10:01;  Status DC


Digoxin (Lanoxin) 125 mcg 1X  ONCE IV  Last administered on 3/19/20at 17:10;  

Start 3/19/20 at 18:00;  Stop 3/19/20 at 18:01;  Status DC


Magnesium Sulfate 100 ml @  25 mls/hr 1X  ONCE IV  Last administered on 

3/19/20at 12:48;  Start 3/19/20 at 13:00;  Stop 3/19/20 at 16:59;  Status DC


Sodium Chloride 90 meq/Magnesium Sulfate 10 meq/ Calcium Gluconate 20 meq/ 

Multivitamins 10 ml/Chromium/ Copper/Manganese/ Seleni/Zn 0.5 ml/ Total 

Parenteral Nutrition/Amino Acids/Dextrose/ Fat Emulsion Intravenous 1,512 ml @  

63 mls/hr TPN  CONT IV  Last administered on 3/19/20at 22:25;  Start 3/19/20 at 

22:00;  Stop 3/20/20 at 21:59;  Status DC


Sodium Chloride 1,000 ml @  1,000 mls/hr Q1H PRN IV hypotension;  Start 3/20/20 

at 08:05;  Stop 3/20/20 at 14:04;  Status DC


Albumin Human 200 ml @  200 mls/hr 1X  ONCE IV  Last administered on 3/20/20at 

08:57;  Start 3/20/20 at 08:15;  Stop 3/20/20 at 09:14;  Status DC


Diphenhydramine HCl (Benadryl) 25 mg 1X PRN  PRN IV ITCHING;  Start 3/20/20 at 

08:15;  Stop 3/21/20 at 08:14;  Status DC


Diphenhydramine HCl (Benadryl) 25 mg 1X PRN  PRN IV ITCHING;  Start 3/20/20 at 

08:15;  Stop 3/21/20 at 08:14;  Status DC


Sodium Chloride 1,000 ml @  400 mls/hr Q2H30M PRN IV PATENCY;  Start 3/20/20 at 

08:05;  Stop 3/20/20 at 20:04;  Status DC


Info (PHARMACY MONITORING -- do not chart) 1 each PRN DAILY  PRN MC SEE 

COMMENTS;  Start 3/20/20 at 08:15;  Stop 3/24/20 at 07:57;  Status DC


Sodium Chloride 90 meq/Potassium Chloride 15 meq/ Potassium Phosphate 10 mmol/ 

Magnesium Sulfate 10 meq/Calcium Gluconate 20 meq/ Multivitamins 10 ml/Chromium/

Copper/Manganese/ Seleni/Zn 0.5 ml/ Total Parenteral Nutrition/Amino 

Acids/Dextrose/ Fat Emulsion Intravenous 1,512 ml @  63 mls/hr TPN  CONT IV  

Last administered on 3/20/20at 21:01;  Start 3/20/20 at 22:00;  Stop 3/21/20 at 

21:59;  Status DC


Potassium Chloride/Water 100 ml @  100 mls/hr 1X  ONCE IV  Last administered on 

3/20/20at 14:09;  Start 3/20/20 at 14:00;  Stop 3/20/20 at 14:59;  Status DC


Benzocaine (Hurricaine One) 1 spray 1X  ONCE MM  Last administered on 3/20/20at 

16:38;  Start 3/20/20 at 14:30;  Stop 3/20/20 at 14:31;  Status DC


Lidocaine HCl (Glydo (Lidocaine) Jelly) 1 thomas 1X  ONCE MM  Last administered on 

3/20/20at 16:38;  Start 3/20/20 at 14:30;  Stop 3/20/20 at 14:31;  Status DC


Linezolid/Dextrose 300 ml @  300 mls/hr Q12HR IV  Last administered on 3/26/20at

21:04;  Start 3/20/20 at 20:00;  Stop 3/27/20 at 07:50;  Status DC


Acetaminophen (Tylenol) 650 mg PRN Q6HRS  PRN PO MILD PAIN / TEMP;  Start 

3/21/20 at 03:30;  Stop 3/21/20 at 03:36;  Status DC


Acetaminophen (Tylenol) 650 mg PRN Q6HRS  PRN PEG MILD PAIN / TEMP Last 

administered on 4/16/20at 19:56;  Start 3/21/20 at 03:36;  Stop 5/13/20 at 

10:25;  Status DC


Sodium Chloride 1,000 ml @  1,000 mls/hr Q1H PRN IV hypotension;  Start 3/21/20 

at 07:50;  Stop 3/21/20 at 13:49;  Status DC


Albumin Human 200 ml @  200 mls/hr 1X PRN  PRN IV Hypotension;  Start 3/21/20 at

08:00;  Stop 3/21/20 at 13:59;  Status DC


Sodium Chloride (Normal Saline Flush) 10 ml 1X PRN  PRN IV AP catheter pack;  

Start 3/21/20 at 08:00;  Stop 3/22/20 at 07:59;  Status DC


Sodium Chloride (Normal Saline Flush) 10 ml 1X PRN  PRN IV  catheter pack;  

Start 3/21/20 at 08:00;  Stop 3/22/20 at 07:59;  Status DC


Sodium Chloride 1,000 ml @  400 mls/hr Q2H30M PRN IV PATENCY;  Start 3/21/20 at 

07:50;  Stop 3/21/20 at 19:49;  Status DC


Info (PHARMACY MONITORING -- do not chart) 1 each PRN DAILY  PRN MC SEE 

COMMENTS;  Start 3/21/20 at 08:00;  Status UNV


Info (PHARMACY MONITORING -- do not chart) 1 each PRN DAILY  PRN MC SEE COMMENTS

;  Start 3/21/20 at 08:00;  Stop 3/23/20 at 08:25;  Status DC


Sodium Chloride 90 meq/Potassium Chloride 15 meq/ Potassium Phosphate 10 mmol/ 

Magnesium Sulfate 10 meq/Calcium Gluconate 20 meq/ Multivitamins 10 ml/Chromium/

Copper/Manganese/ Seleni/Zn 0.5 ml/ Total Parenteral Nutrition/Amino 

Acids/Dextrose/ Fat Emulsion Intravenous 1,512 ml @  63 mls/hr TPN  CONT IV  

Last administered on 3/21/20at 20:57;  Start 3/21/20 at 22:00;  Stop 3/22/20 at 

21:59;  Status DC


Sodium Chloride 90 meq/Potassium Chloride 15 meq/ Potassium Phosphate 15 mmol/ 

Magnesium Sulfate 10 meq/Calcium Gluconate 20 meq/ Multivitamins 10 ml/Chromium/

Copper/Manganese/ Seleni/Zn 0.5 ml/ Total Parenteral Nutrition/Amino 

Acids/Dextrose/ Fat Emulsion Intravenous 1,512 ml @  63 mls/hr TPN  CONT IV ;  

Start 3/22/20 at 22:00;  Stop 3/22/20 at 14:16;  Status DC


Sodium Chloride 90 meq/Potassium Chloride 15 meq/ Potassium Phosphate 15 mmol/ 

Magnesium Sulfate 10 meq/Calcium Gluconate 20 meq/ Multivitamins 10 ml/Chromium/

Copper/Manganese/ Seleni/Zn 0.5 ml/ Total Parenteral Nutrition/Amino 

Acids/Dextrose/ Fat Emulsion Intravenous 1,200 ml @  50 mls/hr TPN  CONT IV ;  

Start 3/22/20 at 22:00;  Stop 3/22/20 at 14:17;  Status DC


Sodium Chloride 90 meq/Potassium Chloride 15 meq/ Potassium Phosphate 10 mmol/ M

agnesium Sulfate 10 meq/Calcium Gluconate 20 meq/ Multivitamins 10 ml/Chromium/ 

Copper/Manganese/ Seleni/Zn 0.5 ml/ Total Parenteral Nutrition/Amino 

Acids/Dextrose/ Fat Emulsion Intravenous 1,200 ml @  50 mls/hr TPN  CONT IV  

Last administered on 3/22/20at 23:29;  Start 3/22/20 at 22:00;  Stop 3/23/20 at 

21:59;  Status DC


Sodium Chloride 1,000 ml @  1,000 mls/hr Q1H PRN IV hypotension;  Start 3/23/20 

at 07:28;  Stop 3/23/20 at 13:27;  Status DC


Albumin Human 200 ml @  200 mls/hr 1X  ONCE IV  Last administered on 3/23/20at 

08:51;  Start 3/23/20 at 07:30;  Stop 3/23/20 at 08:29;  Status DC


Diphenhydramine HCl (Benadryl) 25 mg 1X PRN  PRN IV ITCHING;  Start 3/23/20 at 

07:30;  Stop 3/24/20 at 07:29;  Status DC


Diphenhydramine HCl (Benadryl) 25 mg 1X PRN  PRN IV ITCHING;  Start 3/23/20 at 

07:30;  Stop 3/24/20 at 07:29;  Status DC


Sodium Chloride 1,000 ml @  400 mls/hr Q2H30M PRN IV PATENCY;  Start 3/23/20 at 

07:28;  Stop 3/23/20 at 19:27;  Status DC


Info (PHARMACY MONITORING -- do not chart) 1 each PRN DAILY  PRN MC SEE 

COMMENTS;  Start 3/23/20 at 07:30;  Stop 4/3/20 at 13:01;  Status DC


Metronidazole 100 ml @  100 mls/hr Q6HRS IV  Last administered on 4/8/20at 

06:26;  Start 3/23/20 at 08:30;  Stop 4/8/20 at 09:58;  Status DC


Micafungin Sodium 100 mg/Dextrose 100 ml @  100 mls/hr Q24H IV  Last 

administered on 4/30/20at 08:18;  Start 3/23/20 at 09:00;  Stop 4/30/20 at 

20:58;  Status DC


Propofol 0 ml @ As Directed STK-MED ONCE IV ;  Start 3/23/20 at 07:53;  Stop 

3/23/20 at 07:53;  Status DC


Etomidate (Amidate) 20 mg STK-MED ONCE IV ;  Start 3/23/20 at 07:53;  Stop 

3/23/20 at 07:54;  Status DC


Midazolam HCl (Versed) 5 mg STK-MED ONCE .ROUTE ;  Start 3/23/20 at 07:57;  Stop

3/23/20 at 07:57;  Status DC


Fentanyl Citrate 30 ml @ 0 mls/hr CONT  PRN IV SEE PROTOCOL Last administered on

4/17/20at 06:12;  Start 3/23/20 at 08:15;  Stop 4/17/20 at 09:19;  Status DC


Artificial Tears (Artificial Tears) 1 drop PRN Q1HR  PRN OU DRY EYE, 1st choice;

 Start 3/23/20 at 08:15;  Stop 4/29/20 at 05:31;  Status DC


Midazolam HCl 50 mg/Sodium Chloride 50 ml @ 0 mls/hr CONT  PRN IV SEE PROTOCOL 

Last administered on 3/26/20at 22:39;  Start 3/23/20 at 08:15;  Stop 3/28/20 at 

15:59;  Status DC


Etomidate (Amidate) 8 mg 1X  ONCE IV  Last administered on 3/23/20at 08:33;  

Start 3/23/20 at 08:30;  Stop 3/23/20 at 08:31;  Status DC


Succinylcholine Chloride (Anectine) 120 mg 1X  ONCE IV  Last administered on 

3/23/20at 08:34;  Start 3/23/20 at 08:30;  Stop 3/23/20 at 08:31;  Status DC


Midazolam HCl (Versed) 5 mg 1X  ONCE IV ;  Start 3/23/20 at 08:30;  Stop 3/23/20

at 08:31;  Status DC


Potassium Chloride 15 meq/ Bicarbonate Dialysis Soln w/ out KCl 5,007.5 ml  @ 

1,000 mls/ hr Q5H1M IV  Last administered on 3/24/20at 11:11;  Start 3/23/20 at 

12:00;  Stop 3/24/20 at 11:15;  Status DC


Potassium Chloride 15 meq/ Bicarbonate Dialysis Soln w/ out KCl 5,007.5 ml  @ 

1,000 mls/ hr Q5H1M IV  Last administered on 3/24/20at 11:12;  Start 3/23/20 at 

12:00;  Stop 3/24/20 at 11:17;  Status DC


Potassium Chloride 15 meq/ Bicarbonate Dialysis Soln w/ out KCl 5,007.5 ml  @ 

1,000 mls/ hr Q5H1M IV  Last administered on 3/24/20at 11:11;  Start 3/23/20 at 

12:00;  Stop 3/24/20 at 11:19;  Status DC


Sodium Chloride 90 meq/Potassium Chloride 15 meq/ Potassium Phosphate 10 mmol/ 

Magnesium Sulfate 10 meq/Calcium Gluconate 20 meq/ Multivitamins 10 ml/Chromium/

Copper/Manganese/ Seleni/Zn 0.5 ml/ Total Parenteral Nutrition/Amino 

Acids/Dextrose/ Fat Emulsion Intravenous 1,400 ml @  58.333 mls/ hr TPN  CONT IV

 Last administered on 3/23/20at 21:42;  Start 3/23/20 at 22:00;  Stop 3/24/20 at

21:59;  Status DC


Heparin Sodium (Porcine) (Heparin Sodium) 5,000 unit Q8HRS SQ  Last administered

on 3/28/20at 05:55;  Start 3/23/20 at 15:00;  Stop 3/28/20 at 13:28;  Status DC


Meropenem 500 mg/ Sodium Chloride 50 ml @  100 mls/hr Q6HRS IV  Last administer

ed on 3/25/20at 06:00;  Start 3/24/20 at 09:00;  Stop 3/25/20 at 07:29;  Status 

DC


Potassium Phosphate 20 mmol/ Sodium Chloride 106.6667 ml @  51.667 m... 1X  ONCE

IV  Last administered on 3/24/20at 11:22;  Start 3/24/20 at 10:15;  Stop 3/24/20

at 12:18;  Status DC


Acetaminophen (Tylenol Supp) 650 mg PRN Q6HRS  PRN WI MILD PAIN / TEMP > 100.3'F

Last administered on 6/10/20at 22:16;  Start 3/24/20 at 10:30


Potassium Chloride/Water 100 ml @  100 mls/hr Q1H IV  Last administered on 

3/24/20at 12:12;  Start 3/24/20 at 11:00;  Stop 3/24/20 at 12:59;  Status DC


Potassium Chloride 20 meq/ Bicarbonate Dialysis Soln w/ out KCl 5,010 ml @  1,0

00 mls/hr Q5H1M IV  Last administered on 3/25/20at 08:48;  Start 3/24/20 at 

12:00;  Stop 3/25/20 at 13:03;  Status DC


Potassium Chloride 20 meq/ Bicarbonate Dialysis Soln w/ out KCl 5,010 ml @  

1,000 mls/hr Q5H1M IV  Last administered on 3/29/20at 14:52;  Start 3/24/20 at 

11:30;  Stop 3/29/20 at 19:59;  Status DC


Potassium Chloride 20 meq/ Bicarbonate Dialysis Soln w/ out KCl 5,010 ml @  

1,000 mls/hr Q5H1M IV  Last administered on 3/29/20at 14:53;  Start 3/24/20 at 

11:30;  Stop 3/29/20 at 19:59;  Status DC


Sodium Chloride 90 meq/Potassium Chloride 15 meq/ Potassium Phosphate 15 mmol/ 

Magnesium Sulfate 10 meq/Calcium Gluconate 15 meq/ Multivitamins 10 ml/Chromium/

Copper/Manganese/ Seleni/Zn 0.5 ml/ Total Parenteral Nutrition/Amino 

Acids/Dextrose/ Fat Emulsion Intravenous 1,400 ml @  58.333 mls/ hr TPN  CONT IV

 Last administered on 3/24/20at 22:17;  Start 3/24/20 at 22:00;  Stop 3/25/20 at

21:59;  Status DC


Cefepime HCl (Maxipime) 2 gm Q12HR IVP  Last administered on 4/7/20at 20:56;  

Start 3/25/20 at 09:00;  Stop 4/8/20 at 09:58;  Status DC


Daptomycin 500 mg/ Sodium Chloride 50 ml @  100 mls/hr Q48H IV  Last 

administered on 4/10/20at 09:57;  Start 3/25/20 at 08:30;  Stop 4/10/20 at 

10:07;  Status DC


Lidocaine HCl (Buffered Lidocaine 1%) 3 ml 1X  ONCE INJ  Last administered on 

3/25/20at 10:27;  Start 3/25/20 at 10:30;  Stop 3/25/20 at 10:31;  Status DC


Potassium Phosphate 20 mmol/ Sodium Chloride 106.6667 ml @  51.667 m... 1X  ONCE

IV  Last administered on 3/25/20at 12:51;  Start 3/25/20 at 13:00;  Stop 3/25/20

at 15:03;  Status DC


Sodium Chloride 90 meq/Potassium Chloride 15 meq/ Potassium Phosphate 18 mmol/ 

Magnesium Sulfate 8 meq/Calcium Gluconate 15 meq/ Multivitamins 10 ml/Chromium/ 

Copper/Manganese/ Seleni/Zn 0.5 ml/ Total Parenteral Nutrition/Amino Acids/D

extrose/ Fat Emulsion Intravenous 1,400 ml @  58.333 mls/ hr TPN  CONT IV  Last 

administered on 3/25/20at 22:16;  Start 3/25/20 at 22:00;  Stop 3/26/20 at 

21:59;  Status DC


Potassium Chloride 20 meq/ Bicarbonate Dialysis Soln w/ out KCl 5,010 ml @  

1,000 mls/hr Q5H1M IV  Last administered on 3/29/20at 14:54;  Start 3/25/20 at 

16:00;  Stop 3/29/20 at 19:59;  Status DC


Multi-Ingred Cream/Lotion/Oil/ Oint (Artificial Tears Eye Ointment) 1 thomas PRN 

Q1HR  PRN OU DRY EYE, 2nd choice Last administered on 4/13/20at 08:19;  Start 

3/25/20 at 17:30;  Stop 6/3/20 at 14:39;  Status DC


Sodium Chloride 90 meq/Potassium Chloride 15 meq/ Potassium Phosphate 18 mmol/ 

Magnesium Sulfate 8 meq/Calcium Gluconate 15 meq/ Multivitamins 10 ml/Chromium/ 

Copper/Manganese/ Seleni/Zn 0.5 ml/ Total Parenteral Nutrition/Amino 

Acids/Dextrose/ Fat Emulsion Intravenous 1,400 ml @  58.333 mls/ hr TPN  CONT IV

 Last administered on 3/26/20at 22:00;  Start 3/26/20 at 22:00;  Stop 3/27/20 at

21:59;  Status DC


Albumin Human 500 ml @  125 mls/hr 1X  ONCE IV ;  Start 3/26/20 at 14:15;  Stop 

3/26/20 at 18:14;  Status DC


Sodium Chloride 90 meq/Potassium Chloride 15 meq/ Potassium Phosphate 18 mmol/ 

Magnesium Sulfate 8 meq/Calcium Gluconate 15 meq/ Multivitamins 10 ml/Chromium/ 

Copper/Manganese/ Seleni/Zn 0.5 ml/ Insulin Human Regular 10 unit/ Total 

Parenteral Nutrition/Amino Acids/Dextrose/ Fat Emulsion Intravenous 1,400 ml @  

58.333 mls/ hr TPN  CONT IV  Last administered on 3/27/20at 21:43;  Start 

3/27/20 at 22:00;  Stop 3/28/20 at 21:59;  Status DC


Lidocaine HCl (Buffered Lidocaine 1%) 3 ml STK-MED ONCE .ROUTE ;  Start 3/25/20 

at 10:00;  Stop 3/27/20 at 13:57;  Status DC


Midazolam HCl 100 mg/Sodium Chloride 100 ml @ 7 mls/hr CONT  PRN IV SEE PROTOCOL

Last administered on 4/8/20at 15:35;  Start 3/28/20 at 16:00;  Stop 6/3/20 at 

14:38;  Status DC


Sodium Chloride 90 meq/Potassium Chloride 15 meq/ Potassium Phosphate 18 mmol/ 

Magnesium Sulfate 8 meq/Calcium Gluconate 15 meq/ Multivitamins 10 ml/Chromium/ 

Copper/Manganese/ Seleni/Zn 0.5 ml/ Insulin Human Regular 15 unit/ Total 

Parenteral Nutrition/Amino Acids/Dextrose/ Fat Emulsion Intravenous 1,400 ml @  

58.333 mls/ hr TPN  CONT IV  Last administered on 3/28/20at 20:34;  Start 

3/28/20 at 22:00;  Stop 3/29/20 at 21:59;  Status DC


Info ([facility] Electrolyte Protocol) 1 ea CONT PRN  PRN MC PER PROTOCOL;  Start 

3/29/20 at 13:15


Sodium Chloride 90 meq/Potassium Chloride 15 meq/ Potassium Phosphate 18 mmol/ 

Magnesium Sulfate 8 meq/Calcium Gluconate 15 meq/ Multivitamins 10 ml/Chromium/ 

Copper/Manganese/ Seleni/Zn 0.5 ml/ Insulin Human Regular 15 unit/ Total 

Parenteral Nutrition/Amino Acids/Dextrose/ Fat Emulsion Intravenous 1,400 ml @  

58.333 mls/ hr TPN  CONT IV  Last administered on 3/29/20at 22:05;  Start 

3/29/20 at 22:00;  Stop 3/30/20 at 21:59;  Status DC


Potassium Chloride 15 meq/ Bicarbonate Dialysis Soln w/ out KCl 5,007.5 ml  @ 

1,000 mls/ hr Q5H1M IV  Last administered on 4/1/20at 18:14;  Start 3/29/20 at 

20:00;  Stop 4/2/20 at 13:08;  Status DC


Potassium Chloride 15 meq/ Bicarbonate Dialysis Soln w/ out KCl 5,007.5 ml  @ 

1,000 mls/ hr Q5H1M IV  Last administered on 4/1/20at 18:14;  Start 3/29/20 at 

20:00;  Stop 4/2/20 at 13:08;  Status DC


Potassium Chloride 15 meq/ Bicarbonate Dialysis Soln w/ out KCl 5,007.5 ml  @ 

1,000 mls/ hr Q5H1M IV  Last administered on 4/1/20at 18:14;  Start 3/29/20 at 

20:00;  Stop 4/2/20 at 13:08;  Status DC


Iohexol (Omnipaque 240 Mg/ml) 30 ml 1X  ONCE PO  Last administered on 3/30/20at 

11:30;  Start 3/30/20 at 11:30;  Stop 3/30/20 at 11:33;  Status DC


Info (CONTRAST GIVEN -- Rx MONITORING) 1 each PRN DAILY  PRN MC SEE COMMENTS;  

Start 3/30/20 at 11:45;  Stop 4/1/20 at 11:44;  Status DC


Sodium Chloride 90 meq/Potassium Chloride 15 meq/ Potassium Phosphate 18 mmol/ 

Magnesium Sulfate 8 meq/Calcium Gluconate 15 meq/ Multivitamins 10 ml/Chromium/ 

Copper/Manganese/ Seleni/Zn 0.5 ml/ Insulin Human Regular 15 unit/ Total 

Parenteral Nutrition/Amino Acids/Dextrose/ Fat Emulsion Intravenous 1,400 ml @  

58.333 mls/ hr TPN  CONT IV  Last administered on 3/30/20at 21:47;  Start 

3/30/20 at 22:00;  Stop 3/31/20 at 21:59;  Status DC


Sodium Chloride 90 meq/Potassium Chloride 15 meq/ Potassium Phosphate 18 mmol/ 

Magnesium Sulfate 8 meq/Calcium Gluconate 15 meq/ Multivitamins 10 ml/Chromium/ 

Copper/Manganese/ Seleni/Zn 0.5 ml/ Insulin Human Regular 20 unit/ Total 

Parenteral Nutrition/Amino Acids/Dextrose/ Fat Emulsion Intravenous 1,400 ml @  

58.333 mls/ hr TPN  CONT IV  Last administered on 3/31/20at 21:36;  Start 

3/31/20 at 22:00;  Stop 4/1/20 at 21:59;  Status DC


Alteplase, Recombinant (Cathflo For Central Catheter Clearance) 1 mg 1X  ONCE 

INT CAT  Last administered on 3/31/20at 20:03;  Start 3/31/20 at 19:30;  Stop 

3/31/20 at 19:46;  Status DC


Alteplase, Recombinant (Cathflo For Central Catheter Clearance) 1 mg 1X  ONCE 

INT CAT  Last administered on 3/31/20at 22:05;  Start 3/31/20 at 22:00;  Stop 

3/31/20 at 22:01;  Status DC


Sodium Chloride 90 meq/Potassium Chloride 15 meq/ Potassium Phosphate 18 mmol/ 

Magnesium Sulfate 8 meq/Calcium Gluconate 15 meq/ Multivitamins 10 ml/Chromium/ 

Copper/Manganese/ Seleni/Zn 0.5 ml/ Insulin Human Regular 20 unit/ Total 

Parenteral Nutrition/Amino Acids/Dextrose/ Fat Emulsion Intravenous 1,400 ml @  

58.333 mls/ hr TPN  CONT IV  Last administered on 4/1/20at 21:30;  Start 4/1/20 

at 22:00;  Stop 4/2/20 at 21:59;  Status DC


Dexmedetomidine HCl 400 mcg/ Sodium Chloride 100 ml @ 0 mls/hr CONT  PRN IV 

ANXIETY / AGITATION Last administered on 5/30/20at 12:57;  Start 4/2/20 at 

08:15;  Stop 5/30/20 at 18:31;  Status DC


Sodium Chloride 500 ml @  500 mls/hr 1X PRN  PRN IV ELEVATED BP, SEE COMMENTS;  

Start 4/2/20 at 08:15


Atropine Sulfate (ATROPINE 0.5mg SYRINGE) 0.5 mg PRN Q5MIN  PRN IV SEE COMMENTS;

 Start 4/2/20 at 08:15


Furosemide (Lasix) 20 mg 1X  ONCE IVP  Last administered on 4/2/20at 08:19;  

Start 4/2/20 at 08:15;  Stop 4/2/20 at 08:16;  Status DC


Lidocaine HCl (Buffered Lidocaine 1%) 3 ml STK-MED ONCE .ROUTE ;  Start 4/2/20 

at 08:39;  Stop 4/2/20 at 08:39;  Status DC


Lidocaine HCl (Buffered Lidocaine 1%) 6 ml 1X  ONCE INJ  Last administered on 

4/2/20at 09:05;  Start 4/2/20 at 09:00;  Stop 4/2/20 at 09:06;  Status DC


Sodium Chloride 90 meq/Potassium Chloride 15 meq/ Potassium Phosphate 18 mmol/ 

Magnesium Sulfate 8 meq/Calcium Gluconate 15 meq/ Multivitamins 10 ml/Chromium/ 

Copper/Manganese/ Seleni/Zn 0.5 ml/ Insulin Human Regular 20 unit/ Total 

Parenteral Nutrition/Amino Acids/Dextrose/ Fat Emulsion Intravenous 1,400 ml @  

58.333 mls/ hr TPN  CONT IV  Last administered on 4/2/20at 22:45;  Start 4/2/20 

at 22:00;  Stop 4/3/20 at 21:59;  Status DC


Sodium Chloride 1,000 ml @  1,000 mls/hr Q1H PRN IV hypotension;  Start 4/3/20 

at 07:30;  Stop 4/3/20 at 13:29;  Status DC


Albumin Human 200 ml @  200 mls/hr 1X PRN  PRN IV Hypotension Last administered 

on 4/3/20at 09:36;  Start 4/3/20 at 07:30;  Stop 4/3/20 at 13:29;  Status DC


Sodium Chloride (Normal Saline Flush) 10 ml 1X PRN  PRN IV AP catheter pack;  

Start 4/3/20 at 07:30;  Stop 4/3/20 at 21:29;  Status DC


Sodium Chloride (Normal Saline Flush) 10 ml 1X PRN  PRN IV  catheter pack;  

Start 4/3/20 at 07:30;  Stop 4/4/20 at 07:29;  Status DC


Sodium Chloride 1,000 ml @  400 mls/hr Q2H30M PRN IV PATENCY;  Start 4/3/20 at 

07:30;  Stop 4/3/20 at 19:29;  Status DC


Info (PHARMACY MONITORING -- do not chart) 1 each PRN DAILY  PRN MC SEE 

COMMENTS;  Start 4/3/20 at 07:30;  Stop 4/3/20 at 13:02;  Status DC


Info (PHARMACY MONITORING -- do not chart) 1 each PRN DAILY  PRN MC SEE 

COMMENTS;  Start 4/3/20 at 07:30;  Stop 4/5/20 at 12:45;  Status DC


Sodium Chloride 90 meq/Potassium Chloride 15 meq/ Potassium Phosphate 10 mmol/ 

Magnesium Sulfate 8 meq/Calcium Gluconate 15 meq/ Multivitamins 10 ml/Chromium/ 

Copper/Manganese/ Seleni/Zn 0.5 ml/ Insulin Human Regular 25 unit/ Total 

Parenteral Nutrition/Amino Acids/Dextrose/ Fat Emulsion Intravenous 1,400 ml @  

58.333 mls/ hr TPN  CONT IV  Last administered on 4/3/20at 22:19;  Start 4/3/20 

at 22:00;  Stop 4/4/20 at 21:59;  Status DC


Heparin Sodium (Porcine) (Heparin Sodium) 5,000 unit Q12HR SQ  Last administered

on 4/26/20at 08:59;  Start 4/3/20 at 21:00;  Stop 4/26/20 at 10:05;  Status DC


Ondansetron HCl (Zofran) 4 mg PRN Q6HRS  PRN IV NAUSEA/VOMITING;  Start 4/6/20 

at 07:00;  Stop 4/7/20 at 06:59;  Status DC


Fentanyl Citrate (Fentanyl 2ml Vial) 25 mcg PRN Q5MIN  PRN IV MILD PAIN 1-3;  

Start 4/6/20 at 07:00;  Stop 4/7/20 at 06:59;  Status DC


Fentanyl Citrate (Fentanyl 2ml Vial) 50 mcg PRN Q5MIN  PRN IV MODERATE TO SEVERE

PAIN;  Start 4/6/20 at 07:00;  Stop 4/7/20 at 06:59;  Status DC


Ringer's Solution 1,000 ml @  30 mls/hr Q24H IV ;  Start 4/6/20 at 07:00;  Stop 

4/6/20 at 18:59;  Status DC


Lidocaine HCl (Xylocaine-Mpf 1% 2ml Vial) 2 ml PRN 1X  PRN ID PRIOR TO IV START;

 Start 4/6/20 at 07:00;  Stop 4/7/20 at 06:59;  Status DC


Prochlorperazine Edisylate (Compazine) 5 mg PACU PRN  PRN IV NAUSEA, MRX1;  

Start 4/6/20 at 07:00;  Stop 4/7/20 at 06:59;  Status DC


Sodium Chloride 1,000 ml @  1,000 mls/hr Q1H PRN IV hypotension;  Start 4/4/20 

at 09:10;  Stop 4/4/20 at 15:09;  Status DC


Albumin Human 200 ml @  200 mls/hr 1X PRN  PRN IV Hypotension Last administered 

on 4/4/20at 10:10;  Start 4/4/20 at 09:15;  Stop 4/4/20 at 15:14;  Status DC


Sodium Chloride 1,000 ml @  400 mls/hr Q2H30M PRN IV PATENCY;  Start 4/4/20 at 

09:10;  Stop 4/4/20 at 21:09;  Status DC


Info (PHARMACY MONITORING -- do not chart) 1 each PRN DAILY  PRN MC SEE 

COMMENTS;  Start 4/4/20 at 09:15;  Stop 4/5/20 at 12:45;  Status DC


Info (PHARMACY MONITORING -- do not chart) 1 each PRN DAILY  PRN MC SEE 

COMMENTS;  Start 4/4/20 at 09:15;  Stop 4/5/20 at 12:45;  Status DC


Sodium Chloride 90 meq/Potassium Chloride 15 meq/ Potassium Phosphate 10 mmol/ 

Magnesium Sulfate 8 meq/Calcium Gluconate 15 meq/ Multivitamins 10 ml/Chromium/ 

Copper/Manganese/ Seleni/Zn 0.5 ml/ Insulin Human Regular 25 unit/ Total Pare

nteral Nutrition/Amino Acids/Dextrose/ Fat Emulsion Intravenous 1,400 ml @  

58.333 mls/ hr TPN  CONT IV  Last administered on 4/4/20at 22:10;  Start 4/4/20 

at 22:00;  Stop 4/5/20 at 21:59;  Status DC


Magnesium Sulfate 50 ml @ 25 mls/hr PRN DAILY  PRN IV for Mag < 1.7 on am labs 

Last administered on 6/16/20at 08:21;  Start 4/5/20 at 09:15


Sodium Chloride 90 meq/Potassium Chloride 15 meq/ Potassium Phosphate 10 mmol/ 

Magnesium Sulfate 8 meq/Calcium Gluconate 15 meq/ Multivitamins 10 ml/Chromium/ 

Copper/Manganese/ Seleni/Zn 0.5 ml/ Insulin Human Regular 25 unit/ Total 

Parenteral Nutrition/Amino Acids/Dextrose/ Fat Emulsion Intravenous 1,400 ml @  

58.333 mls/ hr TPN  CONT IV  Last administered on 4/5/20at 21:20;  Start 4/5/20 

at 22:00;  Stop 4/6/20 at 21:59;  Status DC


Sodium Chloride 1,000 ml @  1,000 mls/hr Q1H PRN IV hypotension;  Start 4/5/20 

at 12:23;  Stop 4/5/20 at 18:22;  Status DC


Albumin Human 200 ml @  200 mls/hr 1X  ONCE IV  Last administered on 4/5/20at 

13:34;  Start 4/5/20 at 12:30;  Stop 4/5/20 at 13:29;  Status DC


Diphenhydramine HCl (Benadryl) 25 mg 1X PRN  PRN IV ITCHING;  Start 4/5/20 at 

12:30;  Stop 4/6/20 at 12:29;  Status DC


Diphenhydramine HCl (Benadryl) 25 mg 1X PRN  PRN IV ITCHING;  Start 4/5/20 at 

12:30;  Stop 4/6/20 at 12:29;  Status DC


Info (PHARMACY MONITORING -- do not chart) 1 each PRN DAILY  PRN MC SEE 

COMMENTS;  Start 4/5/20 at 12:30;  Status Cancel


Bupivacaine HCl/ Epinephrine Bitart (Sensorcain-Epi 0.5%-1:622704 Mpf) 30 ml 

STK-MED ONCE .ROUTE  Last administered on 4/6/20at 11:44;  Start 4/6/20 at 

11:00;  Stop 4/6/20 at 11:01;  Status DC


Cellulose (Surgicel Fibrillar 1x2) 1 each STK-MED ONCE .ROUTE ;  Start 4/6/20 at

11:00;  Stop 4/6/20 at 11:01;  Status DC


Sodium Chloride 90 meq/Potassium Chloride 15 meq/ Potassium Phosphate 10 mmol/ 

Magnesium Sulfate 12 meq/Calcium Gluconate 15 meq/ Multivitamins 10 ml/Chromium/

Copper/Manganese/ Seleni/Zn 0.5 ml/ Insulin Human Regular 25 unit/ Total 

Parenteral Nutrition/Amino Acids/Dextrose/ Fat Emulsion Intravenous 1,400 ml @  

58.333 mls/ hr TPN  CONT IV  Last administered on 4/6/20at 22:24;  Start 4/6/20 

at 22:00;  Stop 4/7/20 at 21:59;  Status DC


Propofol 20 ml @ As Directed STK-MED ONCE IV ;  Start 4/6/20 at 11:07;  Stop 

4/6/20 at 11:07;  Status DC


Cellulose (Surgicel Hemostat 4x8) 1 each STK-MED ONCE .ROUTE  Last administered 

on 4/6/20at 11:44;  Start 4/6/20 at 11:55;  Stop 4/6/20 at 11:56;  Status DC


Sevoflurane (Ultane) 60 ml STK-MED ONCE IH ;  Start 4/6/20 at 12:46;  Stop 

4/6/20 at 12:46;  Status DC


Sodium Chloride 1,000 ml @  1,000 mls/hr Q1H PRN IV hypotension;  Start 4/6/20 

at 13:51;  Stop 4/6/20 at 19:50;  Status DC


Albumin Human 200 ml @  200 mls/hr 1X PRN  PRN IV Hypotension Last administered 

on 4/6/20at 14:51;  Start 4/6/20 at 14:00;  Stop 4/6/20 at 19:59;  Status DC


Diphenhydramine HCl (Benadryl) 25 mg 1X PRN  PRN IV ITCHING;  Start 4/6/20 at 

14:00;  Stop 4/7/20 at 13:59;  Status DC


Diphenhydramine HCl (Benadryl) 25 mg 1X PRN  PRN IV ITCHING;  Start 4/6/20 at 

14:00;  Stop 4/7/20 at 13:59;  Status DC


Sodium Chloride 1,000 ml @  400 mls/hr Q2H30M PRN IV PATENCY;  Start 4/6/20 at 

13:51;  Stop 4/7/20 at 01:50;  Status DC


Info (PHARMACY MONITORING -- do not chart) 1 each PRN DAILY  PRN MC SEE 

COMMENTS;  Start 4/6/20 at 14:00;  Stop 4/9/20 at 08:16;  Status DC


Heparin Sodium (Porcine) (Hep Lock Adult) 500 unit STK-MED ONCE IVP ;  Start 

4/7/20 at 09:29;  Stop 4/7/20 at 09:30;  Status DC


Sodium Chloride 1,000 ml @  1,000 mls/hr Q1H PRN IV hypotension;  Start 4/7/20 

at 10:43;  Stop 4/7/20 at 16:42;  Status DC


Sodium Chloride 1,000 ml @  400 mls/hr Q2H30M PRN IV PATENCY;  Start 4/7/20 at 

10:43;  Stop 4/7/20 at 22:42;  Status DC


Info (PHARMACY MONITORING -- do not chart) 1 each PRN DAILY  PRN MC SEE 

COMMENTS;  Start 4/7/20 at 10:45;  Status UNV


Info (PHARMACY MONITORING -- do not chart) 1 each PRN DAILY  PRN MC SEE 

COMMENTS;  Start 4/7/20 at 10:45;  Status UNV


Sodium Chloride 90 meq/Potassium Chloride 15 meq/ Magnesium Sulfate 12 

meq/Calcium Gluconate 15 meq/ Multivitamins 10 ml/Chromium/ Copper/Manganese/ 

Seleni/Zn 0.5 ml/ Insulin Human Regular 25 unit/ Total Parenteral 

Nutrition/Amino Acids/Dextrose/ Fat Emulsion Intravenous 1,400 ml @  58.333 mls/

hr TPN  CONT IV  Last administered on 4/7/20at 22:13;  Start 4/7/20 at 22:00;  

Stop 4/8/20 at 21:59;  Status DC


Sodium Chloride 1,000 ml @  1,000 mls/hr Q1H PRN IV hypotension;  Start 4/8/20 

at 07:50;  Stop 4/8/20 at 13:49;  Status DC


Albumin Human 200 ml @  200 mls/hr 1X  ONCE IV ;  Start 4/8/20 at 08:00;  Stop 

4/8/20 at 08:53;  Status DC


Diphenhydramine HCl (Benadryl) 25 mg 1X PRN  PRN IV ITCHING;  Start 4/8/20 at 

08:00;  Stop 4/9/20 at 07:59;  Status DC


Diphenhydramine HCl (Benadryl) 25 mg 1X PRN  PRN IV ITCHING;  Start 4/8/20 at 

08:00;  Stop 4/9/20 at 07:59;  Status DC


Info (PHARMACY MONITORING -- do not chart) 1 each PRN DAILY  PRN MC SEE 

COMMENTS;  Start 4/8/20 at 08:00;  Stop 4/9/20 at 08:16;  Status DC


Albumin Human 50 ml @ 50 mls/hr 1X  ONCE IV ;  Start 4/8/20 at 08:53;  Stop 

4/8/20 at 08:56;  Status DC


Albumin Human 200 ml @  50 mls/hr PRN 1X  PRN IV HYPOTENSION Last administered 

on 4/14/20at 11:54;  Start 4/8/20 at 09:00;  Stop 5/21/20 at 11:14;  Status DC


Meropenem 500 mg/ Sodium Chloride 50 ml @  100 mls/hr Q12H IV  Last administered

on 4/28/20at 10:45;  Start 4/8/20 at 10:00;  Stop 4/28/20 at 12:37;  Status DC


Sodium Chloride 90 meq/Magnesium Sulfate 12 meq/ Calcium Gluconate 15 meq/ 

Multivitamins 10 ml/Chromium/ Copper/Manganese/ Seleni/Zn 0.5 ml/ Insulin Human 

Regular 25 unit/ Total Parenteral Nutrition/Amino Acids/Dextrose/ Fat Emulsion 

Intravenous 1,400 ml @  58.333 mls/ hr TPN  CONT IV  Last administered on 

4/8/20at 21:41;  Start 4/8/20 at 22:00;  Stop 4/9/20 at 21:59;  Status DC


Sodium Chloride 1,000 ml @  1,000 mls/hr Q1H PRN IV hypotension;  Start 4/9/20 

at 07:58;  Stop 4/9/20 at 13:57;  Status DC


Albumin Human 200 ml @  200 mls/hr 1X PRN  PRN IV Hypotension Last administered 

on 4/9/20at 09:30;  Start 4/9/20 at 08:00;  Stop 4/9/20 at 13:59;  Status DC


Sodium Chloride 1,000 ml @  400 mls/hr Q2H30M PRN IV PATENCY;  Start 4/9/20 at 

07:58;  Stop 4/9/20 at 19:57;  Status DC


Info (PHARMACY MONITORING -- do not chart) 1 each PRN DAILY  PRN MC SEE 

COMMENTS;  Start 4/9/20 at 08:00;  Status Cancel


Info (PHARMACY MONITORING -- do not chart) 1 each PRN DAILY  PRN MC SEE 

COMMENTS;  Start 4/9/20 at 08:15;  Status UNV


Sodium Chloride 90 meq/Potassium Phosphate 5 mmol/ Magnesium Sulfate 12 

meq/Calcium Gluconate 15 meq/ Multivitamins 10 ml/Chromium/ Copper/Manganese/ 

Seleni/Zn 0.5 ml/ Insulin Human Regular 30 unit/ Total Parenteral 

Nutrition/Amino Acids/Dextrose/ Fat Emulsion Intravenous 1,400 ml @  58.333 mls/

hr TPN  CONT IV  Last administered on 4/9/20at 22:08;  Start 4/9/20 at 22:00;  

Stop 4/10/20 at 21:59;  Status DC


Linezolid/Dextrose 300 ml @  300 mls/hr Q12HR IV  Last administered on 4/20/20at

20:40;  Start 4/10/20 at 11:00;  Stop 4/21/20 at 08:10;  Status DC


Sodium Chloride 90 meq/Potassium Phosphate 15 mmol/ Magnesium Sulfate 12 

meq/Calcium Gluconate 15 meq/ Multivitamins 10 ml/Chromium/ Copper/Manganese/ 

Seleni/Zn 0.5 ml/ Insulin Human Regular 30 unit/ Total Parenteral 

Nutrition/Amino Acids/Dextrose/ Fat Emulsion Intravenous 1,400 ml @  58.333 mls/

hr TPN  CONT IV  Last administered on 4/10/20at 21:49;  Start 4/10/20 at 22:00; 

Stop 4/11/20 at 21:59;  Status DC


Sodium Chloride 90 meq/Potassium Phosphate 15 mmol/ Magnesium Sulfate 12 

meq/Calcium Gluconate 15 meq/ Multivitamins 10 ml/Chromium/ Copper/Manganese/ 

Seleni/Zn 0.5 ml/ Insulin Human Regular 40 unit/ Total Parenteral Nu

trition/Amino Acids/Dextrose/ Fat Emulsion Intravenous 1,400 ml @  58.333 mls/ 

hr TPN  CONT IV  Last administered on 4/11/20at 21:21;  Start 4/11/20 at 22:00; 

Stop 4/12/20 at 21:59;  Status DC


Sodium Chloride 1,000 ml @  1,000 mls/hr Q1H PRN IV hypotension;  Start 4/11/20 

at 13:26;  Stop 4/11/20 at 19:25;  Status DC


Albumin Human 200 ml @  200 mls/hr 1X PRN  PRN IV Hypotension Last administered 

on 4/11/20at 15:00;  Start 4/11/20 at 13:30;  Stop 4/11/20 at 19:29;  Status DC


Sodium Chloride (Normal Saline Flush) 10 ml 1X PRN  PRN IV AP catheter pack;  

Start 4/11/20 at 13:30;  Stop 4/12/20 at 13:29;  Status DC


Sodium Chloride (Normal Saline Flush) 10 ml 1X PRN  PRN IV  catheter pack;  

Start 4/11/20 at 13:30;  Stop 4/12/20 at 13:29;  Status DC


Sodium Chloride 1,000 ml @  400 mls/hr Q2H30M PRN IV PATENCY;  Start 4/11/20 at 

13:26;  Stop 4/12/20 at 01:25;  Status DC


Info (PHARMACY MONITORING -- do not chart) 1 each PRN DAILY  PRN MC SEE 

COMMENTS;  Start 4/11/20 at 13:30;  Stop 4/11/20 at 13:33;  Status DC


Info (PHARMACY MONITORING -- do not chart) 1 each PRN DAILY  PRN MC SEE 

COMMENTS;  Start 4/11/20 at 13:30;  Stop 4/11/20 at 13:34;  Status DC


Sodium Chloride 90 meq/Potassium Phosphate 19 mmol/ Magnesium Sulfate 12 

meq/Calcium Gluconate 15 meq/ Multivitamins 10 ml/Chromium/ Copper/Manganese/ 

Seleni/Zn 0.5 ml/ Insulin Human Regular 40 unit/ Total Parenteral 

Nutrition/Amino Acids/Dextrose/ Fat Emulsion Intravenous 1,400 ml @  58.333 mls/

hr TPN  CONT IV  Last administered on 4/12/20at 21:54;  Start 4/12/20 at 22:00; 

Stop 4/13/20 at 21:59;  Status DC


Sodium Chloride 1,000 ml @  1,000 mls/hr Q1H PRN IV hypotension;  Start 4/13/20 

at 09:35;  Stop 4/13/20 at 15:34;  Status DC


Albumin Human 200 ml @  200 mls/hr 1X PRN  PRN IV Hypotension;  Start 4/13/20 at

09:45;  Stop 4/13/20 at 15:44;  Status DC


Diphenhydramine HCl (Benadryl) 25 mg 1X PRN  PRN IV ITCHING;  Start 4/13/20 at 

09:45;  Stop 4/14/20 at 09:44;  Status DC


Diphenhydramine HCl (Benadryl) 25 mg 1X PRN  PRN IV ITCHING;  Start 4/13/20 at 

09:45;  Stop 4/14/20 at 09:44;  Status DC


Sodium Chloride 1,000 ml @  400 mls/hr Q2H30M PRN IV PATENCY;  Start 4/13/20 at 

09:35;  Stop 4/13/20 at 21:34;  Status DC


Info (PHARMACY MONITORING -- do not chart) 1 each PRN DAILY  PRN MC SEE 

COMMENTS;  Start 4/13/20 at 09:45;  Status Cancel


Sodium Chloride 100 meq/Potassium Phosphate 19 mmol/ Magnesium Sulfate 12 

meq/Calcium Gluconate 15 meq/ Multivitamins 10 ml/Chromium/ Copper/Manganese/ 

Seleni/Zn 0.5 ml/ Insulin Human Regular 40 unit/ Potassium Chloride 20 meq/ 

Total Parenteral Nutrition/Amino Acids/Dextrose/ Fat Emulsion Intravenous 1,400 

ml @  58.333 mls/ hr TPN  CONT IV  Last administered on 4/13/20at 22:02;  Start 

4/13/20 at 22:00;  Stop 4/14/20 at 21:59;  Status DC


Furosemide (Lasix) 40 mg 1X  ONCE IVP  Last administered on 4/13/20at 14:39;  

Start 4/13/20 at 14:30;  Stop 4/13/20 at 14:31;  Status DC


Metronidazole 100 ml @  100 mls/hr Q8HRS IV  Last administered on 4/21/20at 

06:04;  Start 4/14/20 at 10:00;  Stop 4/21/20 at 08:10;  Status DC


Sodium Chloride 1,000 ml @  1,000 mls/hr Q1H PRN IV hypotension;  Start 4/14/20 

at 08:00;  Stop 4/14/20 at 13:59;  Status DC


Albumin Human 200 ml @  200 mls/hr 1X PRN  PRN IV Hypotension;  Start 4/14/20 at

08:00;  Stop 4/14/20 at 13:59;  Status DC


Sodium Chloride 1,000 ml @  400 mls/hr Q2H30M PRN IV PATENCY;  Start 4/14/20 at 

08:00;  Stop 4/14/20 at 19:59;  Status DC


Info (PHARMACY MONITORING -- do not chart) 1 each PRN DAILY  PRN MC SEE 

COMMENTS;  Start 4/14/20 at 11:30;  Status UNV


Info (PHARMACY MONITORING -- do not chart) 1 each PRN DAILY  PRN MC SEE 

COMMENTS;  Start 4/14/20 at 11:30;  Stop 4/16/20 at 12:13;  Status DC


Sodium Chloride 100 meq/Potassium Phosphate 19 mmol/ Magnesium Sulfate 12 

meq/Calcium Gluconate 15 meq/ Multivitamins 10 ml/Chromium/ Copper/Manganese/ 

Seleni/Zn 0.5 ml/ Insulin Human Regular 40 unit/ Potassium Chloride 20 meq/ 

Total Parenteral Nutrition/Amino Acids/Dextrose/ Fat Emulsion Intravenous 1,400 

ml @  58.333 mls/ hr TPN  CONT IV  Last administered on 4/14/20at 21:52;  Start 

4/14/20 at 22:00;  Stop 4/15/20 at 21:59;  Status DC


Sodium Chloride (Normal Saline Flush) 10 ml QSHIFT  PRN IV AFTER MEDS AND BLOOD 

DRAWS;  Start 4/14/20 at 15:00;  Stop 5/12/20 at 11:27;  Status DC


Sodium Chloride (Normal Saline Flush) 10 ml PRN Q5MIN  PRN IV AFTER MEDS AND 

BLOOD DRAWS;  Start 4/14/20 at 15:00


Sodium Chloride (Normal Saline Flush) 20 ml PRN Q5MIN  PRN IV AFTER MEDS AND 

BLOOD DRAWS;  Start 4/14/20 at 15:00


Sodium Chloride 100 meq/Potassium Phosphate 19 mmol/ Magnesium Sulfate 12 

meq/Calcium Gluconate 15 meq/ Multivitamins 10 ml/Chromium/ Copper/Manganese/ 

Seleni/Zn 0.5 ml/ Insulin Human Regular 40 unit/ Potassium Chloride 20 meq/ 

Total Parenteral Nutrition/Amino Acids/Dextrose/ Fat Emulsion Intravenous 1,400 

ml @  58.333 mls/ hr TPN  CONT IV  Last administered on 4/15/20at 21:20;  Start 

4/15/20 at 22:00;  Stop 4/16/20 at 21:59;  Status DC


Lidocaine HCl (Buffered Lidocaine 1%) 3 ml STK-MED ONCE .ROUTE ;  Start 4/15/20 

at 13:16;  Stop 4/15/20 at 13:16;  Status DC


Lidocaine HCl (Buffered Lidocaine 1%) 6 ml 1X  ONCE INJ  Last administered on 4/

15/20at 13:45;  Start 4/15/20 at 13:30;  Stop 4/15/20 at 13:31;  Status DC


Albumin Human 100 ml @  100 mls/hr 1X  ONCE IV  Last administered on 4/15/20at 

15:41;  Start 4/15/20 at 15:00;  Stop 4/15/20 at 15:59;  Status DC


Albumin Human 50 ml @ 50 mls/hr 1X  ONCE IV  Last administered on 4/15/20at 

15:00;  Start 4/15/20 at 15:00;  Stop 4/15/20 at 15:59;  Status DC


Info (PHARMACY MONITORING -- do not chart) 1 each PRN DAILY  PRN MC SEE 

COMMENTS;  Start 4/16/20 at 11:30;  Status Cancel


Info (PHARMACY MONITORING -- do not chart) 1 each PRN DAILY  PRN MC SEE 

COMMENTS;  Start 4/16/20 at 11:30;  Status UNV


Sodium Chloride 100 meq/Potassium Phosphate 10 mmol/ Magnesium Sulfate 12 

meq/Calcium Gluconate 15 meq/ Multivitamins 10 ml/Chromium/ Copper/Manganese/ 

Seleni/Zn 0.5 ml/ Insulin Human Regular 35 unit/ Potassium Chloride 20 meq/ 

Total Parenteral Nutrition/Amino Acids/Dextrose/ Fat Emulsion Intravenous 1,400 

ml @  58.333 mls/ hr TPN  CONT IV  Last administered on 4/16/20at 22:10;  Start 

4/16/20 at 22:00;  Stop 4/17/20 at 21:59;  Status DC


Sodium Chloride 100 meq/Potassium Phosphate 5 mmol/ Magnesium Sulfate 12 

meq/Calcium Gluconate 15 meq/ Multivitamins 10 ml/Chromium/ Copper/Manganese/ 

Seleni/Zn 0.5 ml/ Insulin Human Regular 35 unit/ Potassium Chloride 20 meq/ 

Total Parenteral Nutrition/Amino Acids/Dextrose/ Fat Emulsion Intravenous 1,400 

ml @  58.333 mls/ hr TPN  CONT IV  Last administered on 4/17/20at 22:59;  Start 

4/17/20 at 22:00;  Stop 4/18/20 at 21:59;  Status DC


Sodium Chloride 1,000 ml @  1,000 mls/hr Q1H PRN IV hypotension;  Start 4/18/20 

at 08:27;  Stop 4/18/20 at 14:26;  Status DC


Albumin Human 200 ml @  200 mls/hr 1X PRN  PRN IV Hypotension Last administered 

on 4/18/20at 09:18;  Start 4/18/20 at 08:30;  Stop 4/18/20 at 14:29;  Status DC


Sodium Chloride 1,000 ml @  400 mls/hr Q2H30M PRN IV PATENCY;  Start 4/18/20 at 

08:27;  Stop 4/18/20 at 20:26;  Status DC


Info (PHARMACY MONITORING -- do not chart) 1 each PRN DAILY  PRN MC SEE 

COMMENTS;  Start 4/18/20 at 08:30;  Status Cancel


Info (PHARMACY MONITORING -- do not chart) 1 each PRN DAILY  PRN MC SEE 

COMMENTS;  Start 4/18/20 at 08:30;  Stop 4/26/20 at 13:10;  Status DC


Sodium Chloride 100 meq/Potassium Chloride 40 meq/ Magnesium Sulfate 15 

meq/Calcium Gluconate 15 meq/ Multivitamins 10 ml/Chromium/ Copper/Manganese/ 

Seleni/Zn 0.5 ml/ Insulin Human Regular 35 unit/ Total Parenteral 

Nutrition/Amino Acids/Dextrose/ Fat Emulsion Intravenous 1,400 ml @  58.333 mls/

hr TPN  CONT IV  Last administered on 4/18/20at 22:00;  Start 4/18/20 at 22:00; 

Stop 4/19/20 at 21:59;  Status DC


Potassium Chloride/Water 100 ml @  100 mls/hr 1X  ONCE IV  Last administered on 

4/18/20at 17:28;  Start 4/18/20 at 14:45;  Stop 4/18/20 at 15:44;  Status DC


Sodium Chloride 100 meq/Potassium Chloride 40 meq/ Magnesium Sulfate 15 

meq/Calcium Gluconate 15 meq/ Multivitamins 10 ml/Chromium/ Copper/Manganese/ 

Seleni/Zn 0.5 ml/ Insulin Human Regular 35 unit/ Total Parenteral Nu

trition/Amino Acids/Dextrose/ Fat Emulsion Intravenous 1,400 ml @  58.333 mls/ 

hr TPN  CONT IV  Last administered on 4/19/20at 22:46;  Start 4/19/20 at 22:00; 

Stop 4/20/20 at 21:59;  Status DC


Sodium Chloride 100 meq/Potassium Chloride 40 meq/ Magnesium Sulfate 20 

meq/Calcium Gluconate 15 meq/ Multivitamins 10 ml/Chromium/ Copper/Manganese/ 

Seleni/Zn 0.5 ml/ Insulin Human Regular 35 unit/ Total Parenteral 

Nutrition/Amino Acids/Dextrose/ Fat Emulsion Intravenous 1,400 ml @  58.333 mls/

hr TPN  CONT IV  Last administered on 4/20/20at 22:31;  Start 4/20/20 at 22:00; 

Stop 4/21/20 at 21:59;  Status DC


Fentanyl Citrate (Fentanyl 2ml Vial) 50 mcg PRN Q2HR  PRN IVP PAIN Last 

administered on 4/27/20at 13:32;  Start 4/20/20 at 21:00;  Stop 4/28/20 at 

12:53;  Status DC


Fentanyl Citrate (Fentanyl 2ml Vial) 25 mcg PRN Q2HR  PRN IVP PAIN;  Start 

4/20/20 at 21:00;  Stop 4/28/20 at 12:54;  Status DC


Enoxaparin Sodium (Lovenox 100mg Syringe) 100 mg Q12HR SQ ;  Start 4/21/20 at 

21:00;  Status UNV


Amino Acids/ Glycerin/ Electrolytes 1,000 ml @  75 mls/hr [id number] IV ;  Start 

4/20/20 at 21:15;  Status UNV


Sodium Chloride 1,000 ml @  1,000 mls/hr Q1H PRN IV hypotension;  Start 4/21/20 

at 07:56;  Stop 4/21/20 at 13:55;  Status DC


Albumin Human 200 ml @  200 mls/hr 1X PRN  PRN IV Hypotension Last administered 

on 4/21/20at 08:40;  Start 4/21/20 at 08:00;  Stop 4/21/20 at 13:59;  Status DC


Sodium Chloride 1,000 ml @  400 mls/hr Q2H30M PRN IV PATENCY;  Start 4/21/20 at 

07:56;  Stop 4/21/20 at 19:55;  Status DC


Info (PHARMACY MONITORING -- do not chart) 1 each PRN DAILY  PRN MC SEE 

COMMENTS;  Start 4/21/20 at 08:00;  Status UNV


Info (PHARMACY MONITORING -- do not chart) 1 each PRN DAILY  PRN MC SEE 

COMMENTS;  Start 4/21/20 at 08:00;  Status UNV


Daptomycin 430 mg/ Sodium Chloride 50 ml @  100 mls/hr Q24H IV  Last 

administered on 4/21/20at 12:35;  Start 4/21/20 at 09:00;  Stop 4/21/20 at 

12:49;  Status DC


Sodium Chloride 100 meq/Potassium Chloride 40 meq/ Magnesium Sulfate 20 

meq/Calcium Gluconate 15 meq/ Multivitamins 10 ml/Chromium/ Copper/Manganese/ 

Seleni/Zn 0.5 ml/ Insulin Human Regular 35 unit/ Total Parenteral 

Nutrition/Amino Acids/Dextrose/ Fat Emulsion Intravenous 1,400 ml @  58.333 mls/

hr TPN  CONT IV  Last administered on 4/21/20at 21:26;  Start 4/21/20 at 22:00; 

Stop 4/22/20 at 21:59;  Status DC


Daptomycin 430 mg/ Sodium Chloride 50 ml @  100 mls/hr Q48H IV ;  Start 4/23/20 

at 09:00;  Stop 4/22/20 at 11:55;  Status DC


Sodium Chloride 100 meq/Potassium Chloride 40 meq/ Magnesium Sulfate 20 

meq/Calcium Gluconate 15 meq/ Multivitamins 10 ml/Chromium/ Copper/Manganese/ 

Seleni/Zn 0.5 ml/ Insulin Human Regular 35 unit/ Total Parenteral 

Nutrition/Amino Acids/Dextrose/ Fat Emulsion Intravenous 1,400 ml @  58.333 mls/

hr TPN  CONT IV  Last administered on 4/22/20at 22:27;  Start 4/22/20 at 22:00; 

Stop 4/23/20 at 21:59;  Status DC


Daptomycin 430 mg/ Sodium Chloride 50 ml @  100 mls/hr Q24H IV  Last 

administered on 4/24/20at 15:07;  Start 4/22/20 at 13:00;  Stop 4/25/20 at 

13:15;  Status DC


Sodium Chloride 100 meq/Potassium Chloride 40 meq/ Magnesium Sulfate 20 

meq/Calcium Gluconate 10 meq/ Multivitamins 10 ml/Chromium/ Copper/Manganese/ 

Seleni/Zn 0.5 ml/ Insulin Human Regular 35 unit/ Total Parenteral 

Nutrition/Amino Acids/Dextrose/ Fat Emulsion Intravenous 1,400 ml @  58.333 mls/

hr TPN  CONT IV  Last administered on 4/24/20at 00:06;  Start 4/23/20 at 22:00; 

Stop 4/24/20 at 21:59;  Status DC


Alteplase, Recombinant (Cathflo For Central Catheter Clearance) 1 mg 1X  ONCE 

INT CAT  Last administered on 4/24/20at 11:44;  Start 4/24/20 at 10:45;  Stop 

4/24/20 at 10:46;  Status DC


Ondansetron HCl (Zofran) 4 mg PRN Q6HRS  PRN IV NAUSEA/VOMITING;  Start 4/27/20 

at 07:00;  Stop 4/28/20 at 06:59;  Status DC


Fentanyl Citrate (Fentanyl 2ml Vial) 25 mcg PRN Q5MIN  PRN IV MILD PAIN 1-3;  

Start 4/27/20 at 07:00;  Stop 4/28/20 at 06:59;  Status DC


Fentanyl Citrate (Fentanyl 2ml Vial) 50 mcg PRN Q5MIN  PRN IV MODERATE TO SEVERE

PAIN Last administered on 4/27/20at 10:17;  Start 4/27/20 at 07:00;  Stop 

4/28/20 at 06:59;  Status DC


Ringer's Solution 1,000 ml @  30 mls/hr Q24H IV ;  Start 4/27/20 at 07:00;  Stop

4/27/20 at 18:59;  Status DC


Lidocaine HCl (Xylocaine-Mpf 1% 2ml Vial) 2 ml PRN 1X  PRN ID PRIOR TO IV START;

 Start 4/27/20 at 07:00;  Stop 4/28/20 at 06:59;  Status DC


Prochlorperazine Edisylate (Compazine) 5 mg PACU PRN  PRN IV NAUSEA, MRX1;  

Start 4/27/20 at 07:00;  Stop 4/28/20 at 06:59;  Status DC


Sodium Acetate 50 meq/Potassium Acetate 55 meq/ Magnesium Sulfate 20 meq/Calcium

Gluconate 10 meq/ Multivitamins 10 ml/Chromium/ Copper/Manganese/ Seleni/Zn 0.5 

ml/ Insulin Human Regular 35 unit/ Total Parenteral Nutrition/Amino 

Acids/Dextrose/ Fat Emulsion Intravenous 1,400 ml @  58.333 mls/ hr TPN  CONT IV

;  Start 4/24/20 at 22:00;  Stop 4/24/20 at 14:15;  Status DC


Sodium Acetate 50 meq/Potassium Acetate 55 meq/ Magnesium Sulfate 20 meq/Calcium

Gluconate 10 meq/ Multivitamins 10 ml/Chromium/ Copper/Manganese/ Seleni/Zn 0.5 

ml/ Insulin Human Regular 35 unit/ Total Parenteral Nutrition/Amino 

Acids/Dextrose/ Fat Emulsion Intravenous 1,800 ml @  75 mls/hr TPN  CONT IV  

Last administered on 4/24/20at 22:38;  Start 4/24/20 at 22:00;  Stop 4/25/20 at 

21:59;  Status DC


Sodium Chloride 1,000 ml @  1,000 mls/hr Q1H PRN IV hypotension;  Start 4/24/20 

at 15:31;  Stop 4/24/20 at 21:30;  Status DC


Diphenhydramine HCl (Benadryl) 25 mg 1X PRN  PRN IV ITCHING;  Start 4/24/20 at 

15:45;  Stop 4/25/20 at 15:44;  Status DC


Diphenhydramine HCl (Benadryl) 25 mg 1X PRN  PRN IV ITCHING;  Start 4/24/20 at 

15:45;  Stop 4/25/20 at 15:44;  Status DC


Sodium Chloride 1,000 ml @  400 mls/hr Q2H30M PRN IV PATENCY;  Start 4/24/20 at 

15:31;  Stop 4/25/20 at 03:30;  Status DC


Info (PHARMACY MONITORING -- do not chart) 1 each PRN DAILY  PRN MC SEE 

COMMENTS;  Start 4/24/20 at 15:45;  Stop 5/26/20 at 14:14;  Status DC


Sodium Acetate 50 meq/Potassium Acetate 55 meq/ Magnesium Sulfate 20 meq/Calcium

Gluconate 10 meq/ Multivitamins 10 ml/Chromium/ Copper/Manganese/ Seleni/Zn 0.5 

ml/ Insulin Human Regular 35 unit/ Total Parenteral Nutrition/Amino 

Acids/Dextrose/ Fat Emulsion Intravenous 1,800 ml @  75 mls/hr TPN  CONT IV  

Last administered on 4/25/20at 22:03;  Start 4/25/20 at 22:00;  Stop 4/26/20 at 

21:59;  Status DC


Daptomycin 430 mg/ Sodium Chloride 50 ml @  100 mls/hr Q24H IV  Last 

administered on 4/30/20at 13:00;  Start 4/25/20 at 13:00;  Stop 4/30/20 at 

20:58;  Status DC


Heparin Sodium (Porcine) 1000 unit/Sodium Chloride 1,001 ml @  1,001 mls/hr 1X  

ONCE IRR ;  Start 4/27/20 at 06:00;  Stop 4/27/20 at 06:59;  Status DC


Potassium Acetate 55 meq/Magnesium Sulfate 20 meq/ Calcium Gluconate 10 meq/ 

Multivitamins 10 ml/Chromium/ Copper/Manganese/ Seleni/Zn 0.5 ml/ Insulin Human 

Regular 35 unit/ Total Parenteral Nutrition/Amino Acids/Dextrose/ Fat Emulsion 

Intravenous 1,920 ml @  80 mls/hr TPN  CONT IV  Last administered on 4/26/20at 

22:10;  Start 4/26/20 at 22:00;  Stop 4/27/20 at 21:59;  Status DC


Dexamethasone Sodium Phosphate (Decadron) 4 mg STK-MED ONCE .ROUTE ;  Start 

4/27/20 at 10:56;  Stop 4/27/20 at 10:57;  Status DC


Ondansetron HCl (Zofran) 4 mg STK-MED ONCE .ROUTE ;  Start 4/27/20 at 10:56;  

Stop 4/27/20 at 10:57;  Status DC


Rocuronium Bromide (Zemuron) 50 mg STK-MED ONCE .ROUTE ;  Start 4/27/20 at 

10:56;  Stop 4/27/20 at 10:57;  Status DC


Fentanyl Citrate (Fentanyl 2ml Vial) 100 mcg STK-MED ONCE .ROUTE ;  Start 

4/27/20 at 10:56;  Stop 4/27/20 at 10:57;  Status DC


Bupivacaine HCl/ Epinephrine Bitart (Sensorcain-Epi 0.5%-1:[id number] Mpf) 30 ml 

STK-MED ONCE .ROUTE  Last administered on 4/27/20at 12:01;  Start 4/27/20 at 

10:58;  Stop 4/27/20 at 10:58;  Status DC


Cellulose (Surgicel Hemostat 2x14) 1 each STK-MED ONCE .ROUTE ;  Start 4/27/20 

at 10:58;  Stop 4/27/20 at 10:59;  Status DC


Iohexol (Omnipaque 300 Mg/ml) 50 ml STK-MED ONCE .ROUTE ;  Start 4/27/20 at 

10:58;  Stop 4/27/20 at 10:59;  Status DC


Cellulose (Surgicel Hemostat 4x8) 1 each STK-MED ONCE .ROUTE ;  Start 4/27/20 at

10:58;  Stop 4/27/20 at 10:59;  Status DC


Bisacodyl (Dulcolax Supp) 10 mg STK-MED ONCE .ROUTE ;  Start 4/27/20 at 10:59;  

Stop 4/27/20 at 10:59;  Status DC


Heparin Sodium (Porcine) 1000 unit/Sodium Chloride 1,001 ml @  1,001 mls/hr 1X  

ONCE IRR ;  Start 4/27/20 at 12:00;  Stop 4/27/20 at 12:59;  Status DC


Propofol 20 ml @ As Directed STK-MED ONCE IV ;  Start 4/27/20 at 11:05;  Stop 

4/27/20 at 11:05;  Status DC


Sevoflurane (Ultane) 90 ml STK-MED ONCE IH ;  Start 4/27/20 at 11:05;  Stop 

4/27/20 at 11:05;  Status DC


Sevoflurane (Ultane) 60 ml STK-MED ONCE IH ;  Start 4/27/20 at 12:26;  Stop 

4/27/20 at 12:27;  Status DC


Propofol 20 ml @ As Directed STK-MED ONCE IV ;  Start 4/27/20 at 12:26;  Stop 

4/27/20 at 12:27;  Status DC


Phenylephrine HCl (PHENYLEPHRINE in 0.9% NACL PF) 1 mg STK-MED ONCE IV ;  Start 

4/27/20 at 12:34;  Stop 4/27/20 at 12:34;  Status DC


Heparin Sodium (Porcine) (Heparin Sodium) 5,000 unit Q12HR SQ  Last administered

on 5/6/20at 20:57;  Start 4/27/20 at 21:00;  Stop 5/7/20 at 09:59;  Status DC


Sodium Chloride (Normal Saline Flush) 3 ml QSHIFT  PRN IV AFTER MEDS AND BLOOD 

DRAWS;  Start 4/27/20 at 13:45


Naloxone HCl (Narcan) 0.4 mg PRN Q2MIN  PRN IV SEE INSTRUCTIONS Last 

administered on 6/6/20at 15:15;  Start 4/27/20 at 13:45


Sodium Chloride 1,000 ml @  25 mls/hr Q24H IV  Last administered on 5/26/20at 

13:37;  Start 4/27/20 at 13:37;  Stop 5/29/20 at 13:09;  Status DC


Naloxone HCl (Narcan) 0.4 mg PRN Q2MIN  PRN IV SEE INSTRUCTIONS;  Start 4/27/20 

at 14:30;  Status UNV


Sodium Chloride 1,000 ml @  25 mls/hr Q24H IV ;  Start 4/27/20 at 14:30;  Status

UNV


Hydromorphone HCl 30 ml @ 0 mls/hr CONT PRN  PRN IV PER PROTOCOL Last 

administered on 5/2/20at 16:08;  Start 4/27/20 at 14:30;  Stop 5/4/20 at 08:55; 

Status DC


Potassium Acetate 55 meq/Magnesium Sulfate 20 meq/ Calcium Gluconate 10 meq/ 

Multivitamins 10 ml/Chromium/ Copper/Manganese/ Seleni/Zn 0.5 ml/ Insulin Human 

Regular 35 unit/ Total Parenteral Nutrition/Amino Acids/Dextrose/ Fat Emulsion 

Intravenous 1,920 ml @  80 mls/hr TPN  CONT IV  Last administered on 4/27/20at 

22:01;  Start 4/27/20 at 22:00;  Stop 4/28/20 at 21:59;  Status DC


Bumetanide (Bumex) 2 mg [id number] IV  Last administered on 5/1/20at 13:50;  Start 

4/28/20 at 14:00;  Stop 5/2/20 at 14:10;  Status DC


Meropenem 1 gm/ Sodium Chloride 100 ml @  200 mls/hr Q8HRS IV  Last administered

on 5/22/20at 05:53;  Start 4/28/20 at 14:00;  Stop 5/22/20 at 09:31;  Status DC


Potassium Acetate 55 meq/Magnesium Sulfate 20 meq/ Calcium Gluconate 10 meq/ 

Multivitamins 10 ml/Chromium/ Copper/Manganese/ Seleni/Zn 0.5 ml/ Insulin Human 

Regular 35 unit/ Total Parenteral Nutrition/Amino Acids/Dextrose/ Fat Emulsion 

Intravenous 1,920 ml @  80 mls/hr TPN  CONT IV  Last administered on 4/28/20at 

22:02;  Start 4/28/20 at 22:00;  Stop 4/29/20 at 21:59;  Status DC


Hydromorphone HCl (Dilaudid Standard PCA) 12 mg STK-MED ONCE IV ;  Start 4/27/20

at 14:35;  Stop 4/28/20 at 13:53;  Status DC


Artificial Tears (Artificial Tears) 1 drop PRN Q15MIN  PRN OU DRY EYE Last 

administered on 6/15/20at 03:38;  Start 4/29/20 at 05:30


Hydromorphone HCl (Dilaudid Standard PCA) 12 mg STK-MED ONCE IV ;  Start 4/28/20

at 12:05;  Stop 4/29/20 at 09:15;  Status DC


Potassium Acetate 65 meq/Magnesium Sulfate 20 meq/ Calcium Gluconate 10 meq/ 

Multivitamins 10 ml/Chromium/ Copper/Manganese/ Seleni/Zn 0.5 ml/ Insulin Human 

Regular 30 unit/ Total Parenteral Nutrition/Amino Acids/Dextrose/ Fat Emulsion 

Intravenous 1,920 ml @  80 mls/hr TPN  CONT IV  Last administered on 4/29/20at 

22:22;  Start 4/29/20 at 22:00;  Stop 4/30/20 at 21:59;  Status DC


Cyclobenzaprine HCl (Flexeril) 10 mg PRN Q6HRS  PRN PO MUSCLE SPASMS;  Start 

4/30/20 at 10:45


Potassium Acetate 55 meq/Magnesium Sulfate 20 meq/ Calcium Gluconate 10 meq/ 

Multivitamins 10 ml/Chromium/ Copper/Manganese/ Seleni/Zn 0.5 ml/ Insulin Human 

Regular 30 unit/ Total Parenteral Nutrition/Amino Acids/Dextrose/ Fat Emulsion 

Intravenous 1,920 ml @  80 mls/hr TPN  CONT IV  Last administered on 5/1/20at 

01:00;  Start 4/30/20 at 22:00;  Stop 5/1/20 at 21:59;  Status DC


Magnesium Sulfate 50 ml @ 25 mls/hr 1X  ONCE IV  Last administered on 4/30/20at 

17:18;  Start 4/30/20 at 12:45;  Stop 4/30/20 at 14:44;  Status DC


Potassium Chloride/Water 100 ml @  100 mls/hr 1X  ONCE IV  Last administered on 

5/1/20at 11:27;  Start 5/1/20 at 12:00;  Stop 5/1/20 at 12:59;  Status DC


Hydromorphone HCl (Dilaudid Standard PCA) 12 mg STK-MED ONCE IV ;  Start 4/29/20

at 10:50;  Stop 5/1/20 at 11:02;  Status DC


Hydromorphone HCl (Dilaudid Standard PCA) 12 mg STK-MED ONCE IV ;  Start 4/30/20

at 13:47;  Stop 5/1/20 at 11:03;  Status DC


Potassium Acetate 30 meq/Magnesium Sulfate 20 meq/ Calcium Gluconate 10 meq/ 

Multivitamins 10 ml/Chromium/ Copper/Manganese/ Seleni/Zn 0.5 ml/ Insulin Human 

Regular 30 unit/ Potassium Chloride 30 meq/ Total Parenteral Nutrition/Amino 

Acids/Dextrose/ Fat Emulsion Intravenous 1,920 ml @  80 mls/hr TPN  CONT IV  

Last administered on 5/1/20at 22:34;  Start 5/1/20 at 22:00;  Stop 5/2/20 at 

21:59;  Status DC


Potassium Chloride/Water 100 ml @  100 mls/hr Q1H IV  Last administered on 

5/2/20at 13:05;  Start 5/2/20 at 07:00;  Stop 5/2/20 at 10:59;  Status DC


Magnesium Sulfate 50 ml @ 25 mls/hr 1X  ONCE IV  Last administered on 5/2/20at 

10:34;  Start 5/2/20 at 10:30;  Stop 5/2/20 at 12:29;  Status DC


Potassium Chloride 75 meq/ Magnesium Sulfate 20 meq/Calcium Gluconate 10 meq/ 

Multivitamins 10 ml/Chromium/ Copper/Manganese/ Seleni/Zn 0.5 ml/ Insulin Human 

Regular 30 unit/ Total Parenteral Nutrition/Amino Acids/Dextrose/ Fat Emulsion 

Intravenous 1,920 ml @  80 mls/hr TPN  CONT IV  Last administered on 5/2/20at 

21:51;  Start 5/2/20 at 22:00;  Stop 5/3/20 at 22:00;  Status DC


Potassium Chloride 75 meq/ Magnesium Sulfate 20 meq/Calcium Gluconate 10 meq/ 

Multivitamins 10 ml/Chromium/ Copper/Manganese/ Seleni/Zn 0.5 ml/ Insulin Human 

Regular 25 unit/ Total Parenteral Nutrition/Amino Acids/Dextrose/ Fat Emulsion 

Intravenous 1,920 ml @  80 mls/hr TPN  CONT IV  Last administered on 5/3/20at 

22:04;  Start 5/3/20 at 22:00;  Stop 5/4/20 at 21:59;  Status DC


Hydromorphone HCl (Dilaudid) 0.4 mg PRN Q4HRS  PRN IVP PAIN Last administered on

5/4/20at 10:57;  Start 5/4/20 at 09:00;  Stop 5/4/20 at 18:59;  Status DC


Micafungin Sodium 100 mg/Dextrose 100 ml @  100 mls/hr Q24H IV  Last 

administered on 5/26/20at 12:17;  Start 5/4/20 at 11:00;  Stop 5/27/20 at 09:59;

 Status DC


Daptomycin 485 mg/ Sodium Chloride 50 ml @  100 mls/hr Q24H IV  Last 

administered on 5/11/20at 13:10;  Start 5/4/20 at 11:00;  Stop 5/12/20 at 07:44;

 Status DC


Potassium Chloride 75 meq/ Magnesium Sulfate 15 meq/Calcium Gluconate 8 meq/ 

Multivitamins 10 ml/Chromium/ Copper/Manganese/ Seleni/Zn 0.5 ml/ Insulin Human 

Regular 25 unit/ Total Parenteral Nutrition/Amino Acids/Dextrose/ Fat Emulsion 

Intravenous 1,920 ml @  80 mls/hr TPN  CONT IV  Last administered on 5/4/20at 

23:08;  Start 5/4/20 at 22:00;  Stop 5/5/20 at 21:59;  Status DC


Haloperidol Lactate (Haldol Inj) 3 mg 1X  ONCE IVP  Last administered on 

5/4/20at 14:37;  Start 5/4/20 at 14:30;  Stop 5/4/20 at 14:31;  Status DC


Hydromorphone HCl (Dilaudid) 1 mg PRN Q4HRS  PRN IVP PAIN Last administered on 

5/18/20at 06:25;  Start 5/4/20 at 19:00;  Stop 5/18/20 at 17:10;  Status DC


Potassium Chloride 75 meq/ Magnesium Sulfate 15 meq/Calcium Gluconate 8 meq/ 

Multivitamins 10 ml/Chromium/ Copper/Manganese/ Seleni/Zn 0.5 ml/ Insulin Human 

Regular 20 unit/ Total Parenteral Nutrition/Amino Acids/Dextrose/ Fat Emulsion 

Intravenous 1,920 ml @  80 mls/hr TPN  CONT IV  Last administered on 5/5/20at 

22:10;  Start 5/5/20 at 22:00;  Stop 5/6/20 at 21:59;  Status DC


Lidocaine HCl (Buffered Lidocaine 1%) 3 ml STK-MED ONCE .ROUTE ;  Start 5/6/20 

at 11:31;  Stop 5/6/20 at 11:31;  Status DC


Lidocaine HCl (Buffered Lidocaine 1%) 3 ml STK-MED ONCE .ROUTE ;  Start 5/6/20 

at 12:28;  Stop 5/6/20 at 12:29;  Status DC


Lidocaine HCl (Buffered Lidocaine 1%) 6 ml 1X  ONCE INJ  Last administered on 

5/6/20at 12:53;  Start 5/6/20 at 12:45;  Stop 5/6/20 at 12:46;  Status DC


Potassium Chloride 75 meq/ Magnesium Sulfate 15 meq/Calcium Gluconate 8 meq/ 

Multivitamins 10 ml/Chromium/ Copper/Manganese/ Seleni/Zn 0.5 ml/ Insulin Human 

Regular 20 unit/ Total Parenteral Nutrition/Amino Acids/Dextrose/ Fat Emulsion 

Intravenous 1,920 ml @  80 mls/hr TPN  CONT IV  Last administered on 5/6/20at 

22:00;  Start 5/6/20 at 22:00;  Stop 5/7/20 at 21:59;  Status DC


Potassium Chloride 75 meq/ Magnesium Sulfate 15 meq/Calcium Gluconate 8 meq/ 

Multivitamins 10 ml/Chromium/ Copper/Manganese/ Seleni/Zn 0.5 ml/ Insulin Human 

Regular 15 unit/ Total Parenteral Nutrition/Amino Acids/Dextrose/ Fat Emulsion 

Intravenous 1,920 ml @  80 mls/hr TPN  CONT IV  Last administered on 5/7/20at 

22:28;  Start 5/7/20 at 22:00;  Stop 5/8/20 at 21:59;  Status DC


Vecuronium Bromide (Norcuron Bolus) 6 mg PRN Q6HRS  PRN IV VENT ASYNCHRONY;  

Start 5/7/20 at 19:15;  Stop 5/7/20 at 19:35;  Status DC


Bumetanide (Bumex) 2 mg 1X  ONCE IV  Last administered on 5/7/20at 22:09;  Start

5/7/20 at 19:45;  Stop 5/7/20 at 19:46;  Status DC


Lidocaine HCl (Buffered Lidocaine 1%) 3 ml STK-MED ONCE .ROUTE ;  Start 5/8/20 

at 07:59;  Stop 5/8/20 at 07:59;  Status DC


Midazolam HCl (Versed) 5 mg STK-MED ONCE .ROUTE ;  Start 5/8/20 at 08:36;  Stop 

5/8/20 at 08:36;  Status DC


Fentanyl Citrate (Fentanyl 5ml Vial) 250 mcg STK-MED ONCE .ROUTE ;  Start 5/8/20

at 08:36;  Stop 5/8/20 at 08:37;  Status DC


Lidocaine HCl (Buffered Lidocaine 1%) 3 ml 1X  ONCE IJ  Last administered on 

5/8/20at 09:30;  Start 5/8/20 at 09:15;  Stop 5/8/20 at 09:16;  Status DC


Midazolam HCl (Versed) 5 mg 1X  ONCE IV  Last administered on 5/8/20at 09:30;  

Start 5/8/20 at 09:15;  Stop 5/8/20 at 09:16;  Status DC


Fentanyl Citrate (Fentanyl 5ml Vial) 250 mcg 1X  ONCE IV  Last administered on 

5/8/20at 09:30;  Start 5/8/20 at 09:15;  Stop 5/8/20 at 09:16;  Status DC


Bumetanide (Bumex) 2 mg DAILY IV  Last administered on 5/18/20at 08:07;  Start 

5/8/20 at 10:00;  Stop 5/18/20 at 17:15;  Status DC


Potassium Chloride 75 meq/ Magnesium Sulfate 15 meq/ Multivitamins 10 

ml/Chromium/ Copper/Manganese/ Seleni/Zn 0.5 ml/ Insulin Human Regular 15 unit/ 

Total Parenteral Nutrition/Amino Acids/Dextrose/ Fat Emulsion Intravenous 1,920 

ml @  80 mls/hr TPN  CONT IV  Last administered on 5/8/20at 21:59;  Start 5/8/20

at 22:00;  Stop 5/9/20 at 21:59;  Status DC


Metoclopramide HCl (Reglan Vial) 10 mg PRN Q3HRS  PRN IVP NAUSEA/VOMITING-3rd 

choice Last administered on 5/14/20at 04:25;  Start 5/9/20 at 16:45


Potassium Chloride 75 meq/ Magnesium Sulfate 15 meq/ Multivitamins 10 

ml/Chromium/ Copper/Manganese/ Seleni/Zn 0.5 ml/ Insulin Human Regular 15 unit/ 

Total Parenteral Nutrition/Amino Acids/Dextrose/ Fat Emulsion Intravenous 1,920 

ml @  80 mls/hr TPN  CONT IV  Last administered on 5/9/20at 22:41;  Start 5/9/20

at 22:00;  Stop 5/10/20 at 21:59;  Status DC


Magnesium Sulfate 50 ml @ 25 mls/hr 1X  ONCE IV  Last administered on 5/10/20at 

10:44;  Start 5/10/20 at 09:00;  Stop 5/10/20 at 10:59;  Status DC


Potassium Chloride/Water 100 ml @  100 mls/hr 1X  ONCE IV  Last administered on 

5/10/20at 09:37;  Start 5/10/20 at 09:00;  Stop 5/10/20 at 09:59;  Status DC


Duloxetine HCl (Cymbalta) 30 mg DAILY PO  Last administered on 5/11/20at 09:48; 

Start 5/10/20 at 14:00;  Stop 5/13/20 at 10:25;  Status DC


Potassium Chloride 80 meq/ Magnesium Sulfate 20 meq/ Multivitamins 10 

ml/Chromium/ Copper/Manganese/ Seleni/Zn 0.5 ml/ Insulin Human Regular 15 unit/ 

Total Parenteral Nutrition/Amino Acids/Dextrose/ Fat Emulsion Intravenous 1,920 

ml @  80 mls/hr TPN  CONT IV  Last administered on 5/10/20at 21:42;  Start 

5/10/20 at 22:00;  Stop 5/11/20 at 21:59;  Status DC


Potassium Chloride 80 meq/ Magnesium Sulfate 20 meq/ Multivitamins 10 

ml/Chromium/ Copper/Manganese/ Seleni/Zn 0.5 ml/ Insulin Human Regular 15 unit/ 

Total Parenteral Nutrition/Amino Acids/Dextrose/ Fat Emulsion Intravenous 1,920 

ml @  80 mls/hr TPN  CONT IV  Last administered on 5/11/20at 22:20;  Start 

5/11/20 at 22:00;  Stop 5/12/20 at 21:59;  Status DC


Lidocaine HCl (Buffered Lidocaine 1%) 3 ml STK-MED ONCE .ROUTE ;  Start 5/12/20 

at 09:54;  Stop 5/12/20 at 09:55;  Status DC


Hydromorphone HCl (Dilaudid Standard PCA) 12 mg STK-MED ONCE IV ;  Start 5/1/20 

at 15:50;  Stop 5/12/20 at 11:24;  Status DC


Potassium Chloride 80 meq/ Magnesium Sulfate 20 meq/ Multivitamins 10 

ml/Chromium/ Copper/Manganese/ Seleni/Zn 0.5 ml/ Insulin Human Regular 15 unit/ 

Total Parenteral Nutrition/Amino Acids/Dextrose/ Fat Emulsion Intravenous 1,920 

ml @  80 mls/hr TPN  CONT IV  Last administered on 5/12/20at 21:40;  Start 5/12/ 20 at 22:00;  Stop 5/13/20 at 21:59;  Status DC


Lidocaine HCl (Buffered Lidocaine 1%) 6 ml 1X  ONCE INJ  Last administered on 5/ 12/20at 14:15;  Start 5/12/20 at 14:15;  Stop 5/12/20 at 14:16;  Status DC


Potassium Chloride 80 meq/ Magnesium Sulfate 20 meq/ Multivitamins 10 

ml/Chromium/ Copper/Manganese/ Seleni/Zn 1 ml/ Insulin Human Regular 15 unit/ 

Total Parenteral Nutrition/Amino Acids/Dextrose/ Fat Emulsion Intravenous 1,920 

ml @  80 mls/hr TPN  CONT IV  Last administered on 5/13/20at 22:04;  Start 

5/13/20 at 22:00;  Stop 5/14/20 at 21:59;  Status DC


Potassium Chloride/Water 100 ml @  100 mls/hr 1X  ONCE IV  Last administered on 

5/14/20at 11:34;  Start 5/14/20 at 11:00;  Stop 5/14/20 at 11:59;  Status DC


Potassium Chloride 90 meq/ Magnesium Sulfate 20 meq/ Multivitamins 10 

ml/Chromium/ Copper/Manganese/ Seleni/Zn 1 ml/ Insulin Human Regular 15 unit/ 

Total Parenteral Nutrition/Amino Acids/Dextrose/ Fat Emulsion Intravenous 1,920 

ml @  80 mls/hr TPN  CONT IV  Last administered on 5/14/20at 22:57;  Start 5/14/ 20 at 22:00;  Stop 5/15/20 at 21:59;  Status DC


Potassium Chloride 90 meq/ Magnesium Sulfate 20 meq/ Multivitamins 10 ml/Chromiu

m/ Copper/Manganese/ Seleni/Zn 1 ml/ Insulin Human Regular 15 unit/ Total 

Parenteral Nutrition/Amino Acids/Dextrose/ Fat Emulsion Intravenous 1,920 ml @  

80 mls/hr TPN  CONT IV  Last administered on 5/15/20at 22:48;  Start 5/15/20 at 

22:00;  Stop 5/16/20 at 21:59;  Status DC


Potassium Chloride 90 meq/ Magnesium Sulfate 20 meq/ Multivitamins 10 

ml/Chromium/ Copper/Manganese/ Seleni/Zn 1 ml/ Insulin Human Regular 15 unit/ 

Total Parenteral Nutrition/Amino Acids/Dextrose/ Fat Emulsion Intravenous 1,890 

ml @  78.75 mls/ hr TPN  CONT IV  Last administered on 5/16/20at 22:15;  Start 

5/16/20 at 22:00;  Stop 5/17/20 at 21:59;  Status DC


Linezolid/Dextrose 300 ml @  300 mls/hr Q12HR IV  Last administered on 5/19/20at

21:08;  Start 5/17/20 at 09:00;  Stop 5/20/20 at 08:11;  Status DC


Daptomycin 450 mg/ Sodium Chloride 50 ml @  100 mls/hr Q24H IV  Last 

administered on 5/20/20at 09:25;  Start 5/17/20 at 09:00;  Stop 5/21/20 at 

08:30;  Status DC


Potassium Chloride 90 meq/ Magnesium Sulfate 20 meq/ Multivitamins 10 ml/Chrom

ium/ Copper/Manganese/ Seleni/Zn 1 ml/ Insulin Human Regular 15 unit/ Total 

Parenteral Nutrition/Amino Acids/Dextrose/ Fat Emulsion Intravenous 1,890 ml @  

78.75 mls/ hr TPN  CONT IV  Last administered on 5/17/20at 21:34;  Start 5/17/20

at 22:00;  Stop 5/18/20 at 21:59;  Status DC


Lorazepam (Ativan Inj) 2 mg STK-MED ONCE .ROUTE ;  Start 5/17/20 at 14:58;  Stop

5/17/20 at 14:58;  Status DC


Metoprolol Tartrate (Lopressor Vial) 5 mg 1X  ONCE IVP  Last administered on 

5/17/20at 15:31;  Start 5/17/20 at 15:15;  Stop 5/17/20 at 15:16;  Status DC


Lorazepam (Ativan Inj) 2 mg 1X  ONCE IVP  Last administered on 5/17/20at 15:30; 

Start 5/17/20 at 15:15;  Stop 5/17/20 at 15:16;  Status DC


Enoxaparin Sodium (Lovenox 40mg Syringe) 40 mg Q24H SQ  Last administered on 

6/5/20at 17:44;  Start 5/17/20 at 17:00;  Stop 6/7/20 at 06:50;  Status DC


Lorazepam (Ativan Inj) 1 mg PRN Q4HRS  PRN IVP ANXIETY / AGITATION MILD-MOD Last

administered on 5/31/20at 15:55;  Start 5/17/20 at 19:15;  Stop 6/2/20 at 11:45;

 Status DC


Lorazepam (Ativan Inj) 2 mg PRN Q4HRS  PRN IVP ANXIETY / AGITATION SEVERE Last 

administered on 6/1/20at 07:55;  Start 5/17/20 at 19:15;  Stop 6/2/20 at 11:45; 

Status DC


Fentanyl Citrate (Fentanyl 2ml Vial) 50 mcg PRN Q4HRS  PRN IVP SEVERE PAIN Last 

administered on 6/13/20at 05:15;  Start 5/18/20 at 13:15;  Stop 6/14/20 at 

09:29;  Status DC


Fentanyl Citrate (Fentanyl 2ml Vial) 25 mcg PRN Q4HRS  PRN IVP MODERATE PAIN 

Last administered on 6/13/20at 00:27;  Start 5/18/20 at 13:15;  Stop 6/14/20 at 

09:30;  Status DC


Potassium Chloride 90 meq/ Magnesium Sulfate 20 meq/ Multivitamins 10 

ml/Chromium/ Copper/Manganese/ Seleni/Zn 1 ml/ Insulin Human Regular 15 unit/ 

Total Parenteral Nutrition/Amino Acids/Dextrose/ Fat Emulsion Intravenous 1,890 

ml @  78.75 mls/ hr TPN  CONT IV  Last administered on 5/18/20at 22:18;  Start 

5/18/20 at 22:00;  Stop 5/19/20 at 21:59;  Status DC


Furosemide (Lasix) 40 mg 1X  ONCE IVP  Last administered on 5/18/20at 21:51;  

Start 5/18/20 at 21:45;  Stop 5/18/20 at 21:48;  Status DC


Albumin Human 100 ml @  100 mls/hr 1X PRN  PRN IV SEE COMMENTS;  Start 5/19/20 

at 01:30


Furosemide (Lasix) 40 mg [id number] IVP  Last administered on 6/3/20at 08:04;  Start 

5/19/20 at 14:00;  Stop 6/3/20 at 13:07;  Status DC


Potassium Chloride 90 meq/ Magnesium Sulfate 20 meq/ Multivitamins 10 

ml/Chromium/ Copper/Manganese/ Seleni/Zn 1 ml/ Insulin Human Regular 15 unit/ 

Total Parenteral Nutrition/Amino Acids/Dextrose/ Fat Emulsion Intravenous 1,800 

ml @  75 mls/hr TPN  CONT IV  Last administered on 5/19/20at 22:31;  Start 

5/19/20 at 22:00;  Stop 5/20/20 at 21:59;  Status DC


Potassium Chloride 90 meq/ Magnesium Sulfate 20 meq/ Multivitamins 10 

ml/Chromium/ Copper/Manganese/ Seleni/Zn 1 ml/ Insulin Human Regular 15 unit/ 

Total Parenteral Nutrition/Amino Acids/Dextrose/ Fat Emulsion Intravenous 1,800 

ml @  75 mls/hr TPN  CONT IV  Last administered on 5/20/20at 22:28;  Start 

5/20/20 at 22:00;  Stop 5/21/20 at 21:59;  Status DC


Potassium Chloride 110 meq/ Magnesium Sulfate 20 meq/ Multivitamins 10 

ml/Chromium/ Copper/Manganese/ Seleni/Zn 1 ml/ Insulin Human Regular 15 unit/ 

Total Parenteral Nutrition/Amino Acids/Dextrose/ Fat Emulsion Intravenous 1,800 

ml @  75 mls/hr TPN  CONT IV  Last administered on 5/21/20at 22:01;  Start 

5/21/20 at 22:00;  Stop 5/22/20 at 21:59;  Status DC


Saliva Substitute (Biotene Moisturizing Mouth) 2 spray PRN Q15MIN  PRN PO DRY 

MOUTH;  Start 5/21/20 at 11:00


Potassium Chloride 110 meq/ Magnesium Sulfate 20 meq/ Multivitamins 10 

ml/Chromium/ Copper/Manganese/ Seleni/Zn 1 ml/ Insulin Human Regular 15 unit/ 

Total Parenteral Nutrition/Amino Acids/Dextrose/ Fat Emulsion Intravenous 1,800 

ml @  75 mls/hr TPN  CONT IV  Last administered on 5/22/20at 22:21;  Start 

5/22/20 at 22:00;  Stop 5/23/20 at 21:59;  Status DC


Potassium Chloride 110 meq/ Magnesium Sulfate 20 meq/ Multivitamins 10 

ml/Chromium/ Copper/Manganese/ Seleni/Zn 1 ml/ Insulin Human Regular 15 unit/ 

Total Parenteral Nutrition/Amino Acids/Dextrose/ Fat Emulsion Intravenous 1,800 

ml @  75 mls/hr TPN  CONT IV  Last administered on 5/23/20at 22:04;  Start 

5/23/20 at 22:00;  Stop 5/24/20 at 21:59;  Status DC


Potassium Chloride 110 meq/ Magnesium Sulfate 20 meq/ Multivitamins 10 

ml/Chromium/ Copper/Manganese/ Seleni/Zn 1 ml/ Insulin Human Regular 15 unit/ 

Total Parenteral Nutrition/Amino Acids/Dextrose/ Fat Emulsion Intravenous 1,800 

ml @  75 mls/hr TPN  CONT IV  Last administered on 5/24/20at 22:48;  Start 

5/24/20 at 22:00;  Stop 5/25/20 at 21:59;  Status DC


Potassium Chloride 70 meq/ Magnesium Sulfate 20 meq/ Multivitamins 10 

ml/Chromium/ Copper/Manganese/ Seleni/Zn 1 ml/ Insulin Human Regular 15 unit/ 

Total Parenteral Nutrition/Amino Acids/Dextrose/ Fat Emulsion Intravenous 1,800 

ml @  75 mls/hr TPN  CONT IV  Last administered on 5/25/20at 21:39;  Start 

5/25/20 at 22:00;  Stop 5/26/20 at 21:59;  Status DC


Meropenem 500 mg/ Sodium Chloride 50 ml @  100 mls/hr Q6HRS IV  Last 

administered on 5/27/20at 06:02;  Start 5/25/20 at 18:00;  Stop 5/27/20 at 

09:59;  Status DC


Barium Sulfate (Varibar Thin Liquid Apple) 148 gm 1X  ONCE PO ;  Start 5/26/20 

at 11:45;  Stop 5/26/20 at 11:49;  Status DC


Potassium Chloride 70 meq/ Magnesium Sulfate 20 meq/ Multivitamins 10 

ml/Chromium/ Copper/Manganese/ Seleni/Zn 1 ml/ Insulin Human Regular 15 unit/ 

Total Parenteral Nutrition/Amino Acids/Dextrose/ Fat Emulsion Intravenous 1,800 

ml @  75 mls/hr TPN  CONT IV  Last administered on 5/26/20at 22:27;  Start 

5/26/20 at 22:00;  Stop 5/27/20 at 21:59;  Status DC


Piperacillin Sod/ Tazobactam Sod 3.375 gm/Sodium Chloride 50 ml @  100 mls/hr 

Q6HRS IV  Last administered on 6/4/20at 06:10;  Start 5/27/20 at 12:00;  Stop 

6/4/20 at 07:26;  Status DC


Potassium Chloride 70 meq/ Magnesium Sulfate 20 meq/ Multivitamins 10 

ml/Chromium/ Copper/Manganese/ Seleni/Zn 1 ml/ Insulin Human Regular 15 unit/ 

Total Parenteral Nutrition/Amino Acids/Dextrose/ Fat Emulsion Intravenous 1,800 

ml @  75 mls/hr TPN  CONT IV  Last administered on 5/27/20at 22:03;  Start 

5/27/20 at 22:00;  Stop 5/28/20 at 21:59;  Status DC


Potassium Chloride 70 meq/ Magnesium Sulfate 20 meq/ Multivitamins 10 

ml/Chromium/ Copper/Manganese/ Seleni/Zn 1 ml/ Insulin Human Regular 15 unit/ 

Total Parenteral Nutrition/Amino Acids/Dextrose/ Fat Emulsion Intravenous 1,800 

ml @  75 mls/hr TPN  CONT IV  Last administered on 5/28/20at 22:33;  Start 

5/28/20 at 22:00;  Stop 5/29/20 at 21:59;  Status DC


Potassium Chloride 70 meq/ Magnesium Sulfate 20 meq/ Multivitamins 10 

ml/Chromium/ Copper/Manganese/ Seleni/Zn 1 ml/ Insulin Human Regular 15 unit/ 

Total Parenteral Nutrition/Amino Acids/Dextrose/ Fat Emulsion Intravenous 1,800 

ml @  75 mls/hr TPN  CONT IV  Last administered on 5/29/20at 23:13;  Start 

5/29/20 at 22:00;  Stop 5/30/20 at 21:59;  Status DC


Potassium Chloride 80 meq/ Magnesium Sulfate 20 meq/ Multivitamins 10 

ml/Chromium/ Copper/Manganese/ Seleni/Zn 1 ml/ Insulin Human Regular 15 unit/ 

Total Parenteral Nutrition/Amino Acids/Dextrose/ Fat Emulsion Intravenous 1,800 

ml @  75 mls/hr TPN  CONT IV  Last administered on 5/30/20at 22:30;  Start 

5/30/20 at 22:00;  Stop 5/31/20 at 21:59;  Status DC


Potassium Chloride 80 meq/ Magnesium Sulfate 20 meq/ Multivitamins 10 

ml/Chromium/ Copper/Manganese/ Seleni/Zn 1 ml/ Insulin Human Regular 15 unit/ 

Total Parenteral Nutrition/Amino Acids/Dextrose/ Fat Emulsion Intravenous 1,800 

ml @  75 mls/hr TPN  CONT IV  Last administered on 5/31/20at 21:54;  Start 

5/31/20 at 22:00;  Stop 6/1/20 at 21:59;  Status DC


Potassium Chloride/Water 100 ml @  100 mls/hr 1X  ONCE IV  Last administered on 

6/1/20at 10:15;  Start 6/1/20 at 10:00;  Stop 6/1/20 at 10:59;  Status DC


Potassium Chloride 90 meq/ Magnesium Sulfate 20 meq/ Multivitamins 10 

ml/Chromium/ Copper/Manganese/ Seleni/Zn 1 ml/ Insulin Human Regular 20 unit/ 

Total Parenteral Nutrition/Amino Acids/Dextrose/ Fat Emulsion Intravenous 1,800 

ml @  75 mls/hr TPN  CONT IV  Last administered on 6/1/20at 22:28;  Start 6/1/20

at 22:00;  Stop 6/2/20 at 21:59;  Status DC


Potassium Chloride 90 meq/ Magnesium Sulfate 20 meq/ Multivitamins 10 

ml/Chromium/ Copper/Manganese/ Seleni/Zn 1 ml/ Insulin Human Regular 20 unit/ 

Total Parenteral Nutrition/Amino Acids/Dextrose/ Fat Emulsion Intravenous 1,800 

ml @  75 mls/hr TPN  CONT IV  Last administered on 6/2/20at 22:08;  Start 6/2/20

at 22:00;  Stop 6/3/20 at 21:59;  Status DC


Lorazepam (Ativan Inj) 0.25 mg PRN Q4HRS  PRN IVP ANXIETY / AGITATION Last 

administered on 6/13/20at 02:25;  Start 6/3/20 at 07:30


Potassium Chloride 90 meq/ Magnesium Sulfate 20 meq/ Multivitamins 10 

ml/Chromium/ Copper/Manganese/ Seleni/Zn 1 ml/ Insulin Human Regular 20 unit/ 

Total Parenteral Nutrition/Amino Acids/Dextrose/ Fat Emulsion Intravenous 1,800 

ml @  75 mls/hr TPN  CONT IV  Last administered on 6/3/20at 23:13;  Start 6/3/20

at 22:00;  Stop 6/4/20 at 21:59;  Status DC


Furosemide (Lasix) 40 mg DAILY IVP  Last administered on 6/5/20at 11:14;  Start 

6/3/20 at 13:30;  Stop 6/7/20 at 09:12;  Status DC


Fluoxetine HCl (PROzac) 20 mg QHS PEG  Last administered on 6/15/20at 21:53;  

Start 6/4/20 at 21:00


Fentanyl (Duragesic 50mcg/ Hr Patch) 1 patch Q72H TD  Last administered on 

6/4/20at 21:22;  Start 6/4/20 at 21:00;  Stop 6/13/20 at 12:00;  Status DC


Potassium Chloride 40 meq/ Potassium Acetate 60 meq/Magnesium Sulfate 10 meq/ 

Multivitamins 10 ml/Chromium/ Copper/Manganese/ Seleni/Zn 1 ml/ Insulin Human 

Regular 20 unit/ Total Parenteral Nutrition/Amino Acids/Dextrose/ Fat Emulsion 

Intravenous 1,800 ml @  75 mls/hr TPN  CONT IV  Last administered on 6/5/20at 

00:03;  Start 6/4/20 at 22:00;  Stop 6/5/20 at 21:59;  Status DC


Potassium Acetate 80 meq/Magnesium Sulfate 5 meq/ Multivitamins 10 ml/Chromium/ 

Copper/Manganese/ Seleni/Zn 1 ml/ Insulin Human Regular 20 unit/ Total 

Parenteral Nutrition/Amino Acids/Dextrose/ Fat Emulsion Intravenous 1,920 ml @  

80 mls/hr TPN  CONT IV  Last administered on 6/5/20at 21:59;  Start 6/5/20 at 

22:00;  Stop 6/6/20 at 21:59;  Status DC


Potassium Acetate 60 meq/Magnesium Sulfate 5 meq/ Multivitamins 10 ml/Chromium/ 

Copper/Manganese/ Seleni/Zn 1 ml/ Insulin Human Regular 30 unit/ Total 

Parenteral Nutrition/Amino Acids/Dextrose/ Fat Emulsion Intravenous 1,920 ml @  

80 mls/hr TPN  CONT IV  Last administered on 6/6/20at 21:54;  Start 6/6/20 at 

22:00;  Stop 6/7/20 at 21:59;  Status DC


Norepinephrine Bitartrate 8 mg/ Dextrose 258 ml @  13.332 mls/ hr CONT  PRN IV 

PER PROTOCOL Last administered on 6/7/20at 21:46;  Start 6/7/20 at 06:30


Albumin Human 500 ml @  125 mls/hr 1X  ONCE IV  Last administered on 6/7/20at 

08:10;  Start 6/7/20 at 08:15;  Stop 6/7/20 at 12:14;  Status DC


Potassium Acetate 40 meq/Magnesium Sulfate 5 meq/ Multivitamins 10 ml/Chromium/ 

Copper/Manganese/ Seleni/Zn 1 ml/ Insulin Human Regular 30 unit/ Total 

Parenteral Nutrition/Amino Acids/Dextrose/ Fat Emulsion Intravenous 1,920 ml @  

80 mls/hr TPN  CONT IV  Last administered on 6/7/20at 22:23;  Start 6/7/20 at 

22:00;  Stop 6/8/20 at 21:59;  Status DC


Meropenem 1 gm/ Sodium Chloride 100 ml @  200 mls/hr Q8HRS IV ;  Start 6/7/20 at

14:00;  Status Cancel


Meropenem 1 gm/ Sodium Chloride 100 ml @  200 mls/hr Q8HRS IV  Last administered

on 6/7/20at 11:04;  Start 6/7/20 at 10:00;  Stop 6/7/20 at 13:00;  Status DC


Meropenem 1 gm/ Sodium Chloride 100 ml @  200 mls/hr Q12HR IV  Last administered

on 6/17/20at 08:23;  Start 6/7/20 at 21:00


Sodium Chloride 1,000 ml @  1,000 mls/hr 1X  ONCE IV  Last administered on 

6/7/20at 11:06;  Start 6/7/20 at 10:45;  Stop 6/7/20 at 11:44;  Status DC


Micafungin Sodium 100 mg/Dextrose 100 ml @  100 mls/hr Q24H IV  Last 

administered on 6/16/20at 10:43;  Start 6/7/20 at 11:00


Daptomycin 410 mg/ Sodium Chloride 50 ml @  100 mls/hr Q24H IV  Last 

administered on 6/9/20at 13:33;  Start 6/7/20 at 14:00;  Stop 6/10/20 at 08:30; 

Status DC


Midazolam HCl (Versed) 2 mg STK-MED ONCE .ROUTE ;  Start 6/7/20 at 14:47;  Stop 

6/7/20 at 14:48;  Status DC


Fentanyl Citrate (Fentanyl 2ml Vial) 100 mcg STK-MED ONCE .ROUTE ;  Start 6/7/20

at 14:47;  Stop 6/7/20 at 14:48;  Status DC


Flumazenil (Romazicon) 0.5 mg STK-MED ONCE IV ;  Start 6/7/20 at 14:48;  Stop 

6/7/20 at 14:48;  Status DC


Naloxone HCl (Narcan) 0.4 mg STK-MED ONCE .ROUTE ;  Start 6/7/20 at 14:48;  Stop

6/7/20 at 14:48;  Status DC


Lidocaine HCl (Lidocaine 1% 20ml Vial) 20 ml STK-MED ONCE .ROUTE ;  Start 6/7/20

at 14:48;  Stop 6/7/20 at 14:48;  Status DC


Midazolam HCl (Versed) 2 mg 1X  ONCE IV  Last administered on 6/7/20at 15:28;  

Start 6/7/20 at 15:00;  Stop 6/7/20 at 15:01;  Status DC


Fentanyl Citrate (Fentanyl 2ml Vial) 100 mcg 1X  ONCE IV  Last administered on 

6/7/20at 15:28;  Start 6/7/20 at 15:00;  Stop 6/7/20 at 15:01;  Status DC


Lidocaine HCl (Lidocaine 1% 20ml Vial) 20 ml 1X  ONCE INJ  Last administered on 

6/7/20at 15:30;  Start 6/7/20 at 15:00;  Stop 6/7/20 at 15:01;  Status DC


Sodium Chloride 1,000 ml @  100 mls/hr Q10H IV  Last administered on 6/16/20at 

07:30;  Start 6/7/20 at 20:00;  Stop 6/16/20 at 11:26;  Status DC


Sodium Bicarbonate (Sodium Bicarb Adult 8.4% Syr) 50 meq 1X  ONCE IV  Last 

administered on 6/7/20at 21:47;  Start 6/7/20 at 22:00;  Stop 6/7/20 at 22:01;  

Status DC


Potassium Acetate 40 meq/Magnesium Sulfate 5 meq/ Multivitamins 10 ml/Chromium/ 

Copper/Manganese/ Seleni/Zn 1 ml/ Insulin Human Regular 30 unit/ Total 

Parenteral Nutrition/Amino Acids/Dextrose/ Fat Emulsion Intravenous 1,920 ml @  

80 mls/hr TPN  CONT IV  Last administered on 6/8/20at 22:28;  Start 6/8/20 at 

22:00;  Stop 6/9/20 at 21:59;  Status DC


Sodium Chloride 500 ml @  500 mls/hr 1X  ONCE IV  Last administered on 6/9/20at 

06:39;  Start 6/9/20 at 06:45;  Stop 6/9/20 at 07:44;  Status DC


Potassium Acetate 40 meq/Magnesium Sulfate 5 meq/ Multivitamins 10 ml/Chromium/ 

Copper/Manganese/ Seleni/Zn 1 ml/ Insulin Human Regular 30 unit/ Total P

arenteral Nutrition/Amino Acids/Dextrose/ Fat Emulsion Intravenous 1,920 ml @  

80 mls/hr TPN  CONT IV  Last administered on 6/9/20at 22:03;  Start 6/9/20 at 

22:00;  Stop 6/10/20 at 21:59;  Status DC


Metoprolol Tartrate (Lopressor Vial) 5 mg PRN Q6HRS  PRN IVP HYPERTENSION Last 

administered on 6/13/20at 04:03;  Start 6/10/20 at 09:00


Potassium Acetate 40 meq/Magnesium Sulfate 5 meq/ Multivitamins 10 ml/Chromium/ 

Copper/Manganese/ Seleni/Zn 1 ml/ Insulin Human Regular 30 unit/ Total 

Parenteral Nutrition/Amino Acids/Dextrose/ Fat Emulsion Intravenous 1,920 ml @  

80 mls/hr TPN  CONT IV  Last administered on 6/10/20at 21:26;  Start 6/10/20 at 

22:00;  Stop 6/11/20 at 21:59;  Status DC


Potassium Acetate 40 meq/Magnesium Sulfate 5 meq/ Multivitamins 10 ml/Chromium/ 

Copper/Manganese/ Seleni/Zn 1 ml/ Insulin Human Regular 30 unit/ Total 

Parenteral Nutrition/Amino Acids/Dextrose/ Fat Emulsion Intravenous 1,920 ml @  

80 mls/hr TPN  CONT IV  Last administered on 6/11/20at 23:23;  Start 6/11/20 at 

22:00;  Stop 6/12/20 at 21:59;  Status DC


Potassium Acetate 40 meq/Magnesium Sulfate 5 meq/ Multivitamins 10 ml/Chromium/ 

Copper/Manganese/ Seleni/Zn 1 ml/ Insulin Human Regular 30 unit/ Total 

Parenteral Nutrition/Amino Acids/Dextrose/ Fat Emulsion Intravenous 1,920 ml @  

80 mls/hr TPN  CONT IV  Last administered on 6/12/20at 21:35;  Start 6/12/20 at 

22:00;  Stop 6/13/20 at 21:59;  Status DC


Furosemide (Lasix) 20 mg 1X  ONCE IVP  Last administered on 6/13/20at 06:26;  

Start 6/13/20 at 06:15;  Stop 6/13/20 at 06:16;  Status DC


Methylprednisolone Sodium Succinate (SOLU-Medrol 125MG VIAL) 125 mg 1X  ONCE IV 

Last administered on 6/13/20at 06:26;  Start 6/13/20 at 06:15;  Stop 6/13/20 at 

06:16;  Status DC


Albuterol/ Ipratropium (Duoneb) 3 ml Q4HRS NEB  Last administered on 6/17/20at 

04:04;  Start 6/13/20 at 08:00


Fentanyl Citrate 30 ml @ 0 mls/hr CONT  PRN IV SEE PROTOCOL Last administered on

6/16/20at 11:18;  Start 6/13/20 at 06:00


Propofol 100 ml @ 0 mls/hr CONT  PRN IV SEE PROTOCOL Last administered on 

6/17/20at 04:31;  Start 6/13/20 at 06:00


Fentanyl Citrate (Fentanyl 2ml Vial) 25 mcg PRN Q1HR  PRN IV SEE COMMENTS;  

Start 6/13/20 at 06:00


Fentanyl Citrate (Fentanyl 2ml Vial) 50 mcg PRN Q1HR  PRN IV SEE COMMENTS;  

Start 6/13/20 at 06:00


Chlorhexidine Gluconate (Peridex) 15 ml BID MM ;  Start 6/13/20 at 09:00;  Stop 

6/13/20 at 07:58;  Status DC


Potassium Acetate 40 meq/Magnesium Sulfate 5 meq/ Multivitamins 10 ml/Chromium/ 

Copper/Manganese/ Seleni/Zn 1 ml/ Insulin Human Regular 30 unit/ Total 

Parenteral Nutrition/Amino Acids/Dextrose/ Fat Emulsion Intravenous 1,920 ml @  

80 mls/hr TPN  CONT IV  Last administered on 6/13/20at 21:19;  Start 6/13/20 at 

22:00;  Stop 6/14/20 at 21:59;  Status DC


Acetylcysteine (Mucomyst 20% Resp Treatment) 600 mg BID NEB  Last administered 

on 6/16/20at 20:36;  Start 6/13/20 at 21:00


Magnesium Sulfate 100 ml @  25 mls/hr 1X  ONCE IV  Last administered on 

6/13/20at 15:48;  Start 6/13/20 at 15:45;  Stop 6/13/20 at 19:44;  Status DC


Potassium Acetate 40 meq/Magnesium Sulfate 5 meq/ Multivitamins 10 ml/Chromium/ 

Copper/Manganese/ Seleni/Zn 1 ml/ Insulin Human Regular 30 unit/ Total 

Parenteral Nutrition/Amino Acids/Dextrose/ Fat Emulsion Intravenous 1,920 ml @  

80 mls/hr TPN  CONT IV  Last administered on 6/14/20at 21:35;  Start 6/14/20 at 

22:00;  Stop 6/15/20 at 21:59;  Status DC


Potassium Chloride/Water 100 ml @  100 mls/hr Q1H IV  Last administered on 

6/15/20at 08:31;  Start 6/15/20 at 07:00;  Stop 6/15/20 at 08:59;  Status DC


Potassium Acetate 40 meq/Magnesium Sulfate 5 meq/ Multivitamins 10 ml/Chromium/ 

Copper/Manganese/ Seleni/Zn 1 ml/ Insulin Human Regular 30 unit/ Total 

Parenteral Nutrition/Amino Acids/Dextrose/ Fat Emulsion Intravenous 1,920 ml @  

80 mls/hr TPN  CONT IV  Last administered on 6/15/20at 21:54;  Start 6/15/20 at 

22:00;  Stop 6/16/20 at 19:34;  Status DC


Lidocaine HCl (Buffered Lidocaine 1%) 3 ml STK-MED ONCE .ROUTE ;  Start 6/15/20 

at 12:14;  Stop 6/15/20 at 12:14;  Status DC


Lidocaine HCl (Buffered Lidocaine 1%) 3 ml 1X  ONCE IJ  Last administered on 

6/15/20at 13:11;  Start 6/15/20 at 13:00;  Stop 6/15/20 at 13:01;  Status DC


Magnesium Sulfate 50 ml @ 25 mls/hr 1X  ONCE IV ;  Start 6/16/20 at 08:15;  Stop

6/16/20 at 10:14;  Status DC


Potassium Acetate 40 meq/Magnesium Sulfate 10 meq/ Multivitamins 10 ml/Chromium/

Copper/Manganese/ Seleni/Zn 1 ml/ Insulin Human Regular 20 unit/ Total 

Parenteral Nutrition/Amino Acids/Dextrose/ Fat Emulsion Intravenous 1,920 ml @  

80 mls/hr TPN  CONT IV  Last administered on 6/16/20at 21:32;  Start 6/16/20 at 

22:00;  Stop 6/17/20 at 21:59


Potassium Chloride/Water 100 ml @  100 mls/hr Q1H IV  Last administered on 

6/17/20at 07:48;  Start 6/17/20 at 08:00;  Stop 6/17/20 at 09:59





Active Scripts


Active


Reported


Bisoprolol Fumarate 5 Mg Tablet 10 Mg PO DAILY


Vitals/I & O





Vital Sign - Last 24 Hours








 6/16/20 6/16/20 6/16/20 6/16/20





 10:00 11:00 11:18 11:36


 


Pulse 124 118  


 


Resp 24 21 25 


 


B/P (MAP) 124/60 (81) 130/67 (88)  


 


Pulse Ox 99 99 99 


 


O2 Delivery Tracheal Collar Tracheal Collar Tracheal Collar Mechanical 

Ventilator


 


O2 Flow Rate 9.0 9.0 9.0 





 6/16/20 6/16/20 6/16/20 6/16/20





 11:36 11:51 12:00 12:00


 


Temp   97.7 





   97.7 


 


Pulse   114 


 


Resp  29 31 


 


B/P (MAP)   116/58 (77) 


 


Pulse Ox  99 99 


 


O2 Delivery  Tracheal Collar Tracheal Collar 


 


O2 Flow Rate  9.0 9.0 


 


    





    





 6/16/20 6/16/20 6/16/20 6/16/20





 12:06 13:00 14:00 15:00


 


Pulse  114 112 111


 


Resp  19 16 20


 


B/P (MAP)  121/67 (85) 127/66 (86) 131/67 (88)


 


Pulse Ox 99 99 99 99


 


O2 Delivery Tracheal Collar Tracheal Collar Tracheal Collar Tracheal Collar


 


O2 Flow Rate 9.0 9.0 9.0 9.0





 6/16/20 6/16/20 6/16/20 6/16/20





 15:51 15:54 15:54 16:00


 


Temp    98.2





    98.2


 


Pulse    112


 


Resp    19


 


B/P (MAP)    123/61 (81)


 


Pulse Ox 97   99


 


O2 Delivery Tracheal Collar Trach Collar  Tracheal Collar


 


O2 Flow Rate 9.0   9.0


 


    





    





 6/16/20 6/16/20 6/16/20 6/16/20





 17:00 19:00 20:00 20:00


 


Temp   98.6 





   98.6 


 


Pulse 111 116 122 


 


Resp 17 18 24 


 


B/P (MAP) 119/58 (78) 118/60 (79) 134/62 (86) 


 


Pulse Ox 99 99 99 


 


O2 Delivery Tracheal Collar Tracheal Collar Tracheal Collar Trach Collar


 


O2 Flow Rate 9.0   


 


    





    





 6/16/20 6/16/20 6/16/20 6/16/20





 20:00 21:00 22:00 22:30


 


Pulse 120 118 118 


 


Resp  16 20 


 


B/P (MAP) 131/62 (85) 115/57 (76) 115/62 (79) 


 


Pulse Ox  99 98 99


 


O2 Delivery  Tracheal Collar Ventilator Ventilator





 6/16/20 6/17/20 6/17/20 6/17/20





 23:00 00:00 00:00 00:00


 


Temp   98.8 





   98.8 


 


Pulse 101  104 101


 


Resp 20  20 


 


B/P (MAP) 103/54 (70)  149/65 (93) 143/58 (86)


 


Pulse Ox 99  99 


 


O2 Delivery Ventilator Trach Collar Ventilator 


 


    





    





 6/17/20 6/17/20 6/17/20 6/17/20





 01:00 01:30 02:00 03:00


 


Pulse 109  107 105


 


Resp 20  20 20


 


B/P (MAP) 137/58 (84)  125/62 (83) 110/64 (79)


 


Pulse Ox 100 99 98 98


 


O2 Delivery Ventilator Ventilator Ventilator Ventilator





 6/17/20 6/17/20 6/17/20 6/17/20





 04:00 04:00 04:00 04:16


 


Temp  98.4  





  98.4  


 


Pulse  108 107 


 


Resp  20  


 


B/P (MAP)  113/47 (69) 143/68 (93) 


 


Pulse Ox  99  100


 


O2 Delivery Trach Collar Ventilator  Ventilator


 


    





    





 6/17/20 6/17/20 6/17/20 6/17/20





 05:00 06:00 07:00 07:45


 


Pulse 112 108 105 


 


Resp 20 20 20 


 


B/P (MAP) 112/52 (72) 132/66 (88) 147/61 (89) 


 


Pulse Ox 99 98 98 100


 


O2 Delivery Ventilator Ventilator Ventilator Ventilator





 6/17/20 6/17/20 6/17/20 6/17/20





 08:00 08:00 08:00 09:00


 


Temp  99.0  





  99.0  


 


Pulse  103  104


 


Resp  20 20


 


B/P (MAP)  132/60 (84)  153/70 (97)


 


Pulse Ox  100  98


 


O2 Delivery  Ventilator Mechanical Ventilator Ventilator














Intake and Output   


 


 6/16/20 6/16/20 6/17/20





 15:00 23:00 07:00


 


Intake Total 931 ml 713.4 ml 787.5 ml


 


Output Total 585 ml 905 ml 485 ml


 


Balance 346 ml -191.6 ml 302.5 ml











Hemodynamically unstable?:  No


Is patient in severe pain?:  No


Is NPO status required?:  No











OVIDIO SARAVIA MD          Jun 17, 2020 09:06

## 2020-06-17 NOTE — PDOC
Provider Note


Provider Note


DAINA Flores


mother at bedside


no new issues per RN


continue supportive care





Justicifation of Admission Dx:


Justifications for Admission:


Justification of Admission Dx:  Yes











KIEL BAL MD                Jun 17, 2020 16:28

## 2020-06-17 NOTE — PATHOLOGY
Note

[facility] Accession Number: [id number]

   TESTS               RESULT  FLAG  UNITS    REF RANGE  LAB

------------------------------------------------------------

   Clinician Provided Cytology Information

   No. of containers..01 Other (Miscellaneous)

Source:                                                   01

   PLEURAL FLUID RT

DIAGNOSIS:                                                02

   PLEURAL FLUID RT

   NEGATIVE FOR MALIGNANT CELLS.

   FEW MESOTHELIAL CELLS AND INFLAMMATORY CELLS IDENTIFIED.

   THIS INTERPRETATION INCLUDES EVALUATION OF A CELL BLOCK.

Signed out by:                                            02

   Jonah Tello MD, Pathologist

   NPI- [id number]

Performed by:                                             01

   Sharla Euceda, Cytotechnologist ([facility])

Gross description:                                        01

   10ML, CLOUDY YELLOW, 1TP 1CB

   /LCS  06/16/2020  0534 Local



------------------------------------------------------------

    FLAG LEGEND:

    L-Low Normal,H-High Normal,LL-Alert Low,HH-Alert High

    <-Panic Low,>-Panic High,A-Abnormal,AA-Critical Abnormal

------------------------------------------------------------



Performed at:

[facility]

   [address]

   [place]  [address]

   Duke Lozano MD, Phone: [phone]

[address] [facility]

   [address] [place]  [address]

   Jonah Tello MD, Phone: [phone]

Specimen Comment: A courtesy copy of this report has been sent to [phone], [phone]

Specimen Comment: [id number], [phone]

Specimen Comment: Report sent to DR MASON,DR MONTEJO / DR SOLORZANO

Specimen Comment: Report sent to 

Specimen Comment: A duplicate report has been generated due to demographic updates.

***Performed at:  01

   [address], [place], [place]  [id number]

   MD Duke Lozano MD Phone:  [phone]

## 2020-06-18 VITALS — DIASTOLIC BLOOD PRESSURE: 58 MMHG | SYSTOLIC BLOOD PRESSURE: 146 MMHG

## 2020-06-18 VITALS — SYSTOLIC BLOOD PRESSURE: 111 MMHG | DIASTOLIC BLOOD PRESSURE: 72 MMHG

## 2020-06-18 VITALS — DIASTOLIC BLOOD PRESSURE: 58 MMHG | SYSTOLIC BLOOD PRESSURE: 132 MMHG

## 2020-06-18 VITALS — SYSTOLIC BLOOD PRESSURE: 140 MMHG | DIASTOLIC BLOOD PRESSURE: 56 MMHG

## 2020-06-18 VITALS — DIASTOLIC BLOOD PRESSURE: 52 MMHG | SYSTOLIC BLOOD PRESSURE: 122 MMHG

## 2020-06-18 VITALS — DIASTOLIC BLOOD PRESSURE: 56 MMHG | SYSTOLIC BLOOD PRESSURE: 128 MMHG

## 2020-06-18 VITALS — SYSTOLIC BLOOD PRESSURE: 114 MMHG | DIASTOLIC BLOOD PRESSURE: 61 MMHG

## 2020-06-18 VITALS — DIASTOLIC BLOOD PRESSURE: 64 MMHG | SYSTOLIC BLOOD PRESSURE: 102 MMHG

## 2020-06-18 VITALS — SYSTOLIC BLOOD PRESSURE: 91 MMHG | DIASTOLIC BLOOD PRESSURE: 51 MMHG

## 2020-06-18 VITALS — SYSTOLIC BLOOD PRESSURE: 106 MMHG | DIASTOLIC BLOOD PRESSURE: 52 MMHG

## 2020-06-18 VITALS — SYSTOLIC BLOOD PRESSURE: 105 MMHG | DIASTOLIC BLOOD PRESSURE: 50 MMHG

## 2020-06-18 VITALS — SYSTOLIC BLOOD PRESSURE: 87 MMHG | DIASTOLIC BLOOD PRESSURE: 52 MMHG

## 2020-06-18 VITALS — SYSTOLIC BLOOD PRESSURE: 172 MMHG | DIASTOLIC BLOOD PRESSURE: 66 MMHG

## 2020-06-18 VITALS — DIASTOLIC BLOOD PRESSURE: 56 MMHG | SYSTOLIC BLOOD PRESSURE: 105 MMHG

## 2020-06-18 VITALS — SYSTOLIC BLOOD PRESSURE: 120 MMHG | DIASTOLIC BLOOD PRESSURE: 54 MMHG

## 2020-06-18 VITALS — SYSTOLIC BLOOD PRESSURE: 89 MMHG | DIASTOLIC BLOOD PRESSURE: 59 MMHG

## 2020-06-18 VITALS — SYSTOLIC BLOOD PRESSURE: 128 MMHG | DIASTOLIC BLOOD PRESSURE: 54 MMHG

## 2020-06-18 VITALS — SYSTOLIC BLOOD PRESSURE: 102 MMHG | DIASTOLIC BLOOD PRESSURE: 68 MMHG

## 2020-06-18 VITALS — SYSTOLIC BLOOD PRESSURE: 109 MMHG | DIASTOLIC BLOOD PRESSURE: 68 MMHG

## 2020-06-18 VITALS — SYSTOLIC BLOOD PRESSURE: 118 MMHG | DIASTOLIC BLOOD PRESSURE: 56 MMHG

## 2020-06-18 VITALS — DIASTOLIC BLOOD PRESSURE: 63 MMHG | SYSTOLIC BLOOD PRESSURE: 98 MMHG

## 2020-06-18 VITALS — DIASTOLIC BLOOD PRESSURE: 58 MMHG | SYSTOLIC BLOOD PRESSURE: 122 MMHG

## 2020-06-18 VITALS — SYSTOLIC BLOOD PRESSURE: 114 MMHG | DIASTOLIC BLOOD PRESSURE: 64 MMHG

## 2020-06-18 VITALS — SYSTOLIC BLOOD PRESSURE: 146 MMHG | DIASTOLIC BLOOD PRESSURE: 56 MMHG

## 2020-06-18 VITALS — DIASTOLIC BLOOD PRESSURE: 59 MMHG | SYSTOLIC BLOOD PRESSURE: 91 MMHG

## 2020-06-18 LAB
ANION GAP SERPL CALC-SCNC: 7 MMOL/L (ref 6–14)
BASE EXCESS ABG: -2 MMOL/L (ref -3–3)
BASOPHILS # BLD AUTO: 0 X10^3/UL (ref 0–0.2)
BASOPHILS NFR BLD: 0 % (ref 0–3)
BUN SERPL-MCNC: 20 MG/DL (ref 7–20)
CALCIUM SERPL-MCNC: 8.8 MG/DL (ref 8.5–10.1)
CHLORIDE SERPL-SCNC: 106 MMOL/L (ref 98–107)
CO2 SERPL-SCNC: 24 MMOL/L (ref 21–32)
CREAT SERPL-MCNC: 0.6 MG/DL (ref 0.6–1)
EOSINOPHIL NFR BLD: 0.1 X10^3/UL (ref 0–0.7)
EOSINOPHIL NFR BLD: 1 % (ref 0–3)
ERYTHROCYTE [DISTWIDTH] IN BLOOD BY AUTOMATED COUNT: 18.2 % (ref 11.5–14.5)
GFR SERPLBLD BASED ON 1.73 SQ M-ARVRAT: 106.3 ML/MIN
GLUCOSE SERPL-MCNC: 137 MG/DL (ref 70–99)
HCO3 BLDA-SCNC: 21 MMOL/L (ref 21–28)
HCT VFR BLD CALC: 26 % (ref 36–47)
HGB BLD-MCNC: 8.7 G/DL (ref 12–15.5)
LYMPHOCYTES # BLD: 1.3 X10^3/UL (ref 1–4.8)
LYMPHOCYTES NFR BLD AUTO: 13 % (ref 24–48)
MAGNESIUM SERPL-MCNC: 1.7 MG/DL (ref 1.8–2.4)
MCH RBC QN AUTO: 28 PG (ref 25–35)
MCHC RBC AUTO-ENTMCNC: 33 G/DL (ref 31–37)
MCV RBC AUTO: 85 FL (ref 79–100)
MONO #: 0.4 X10^3/UL (ref 0–1.1)
MONOCYTES NFR BLD: 5 % (ref 0–9)
NEUT #: 8.1 X10^3/UL (ref 1.8–7.7)
NEUTROPHILS NFR BLD AUTO: 81 % (ref 31–73)
PCO2 BLDA: 28 MMHG (ref 35–46)
PLATELET # BLD AUTO: 469 X10^3/UL (ref 140–400)
PO2 BLDA: 116 MMHG (ref 75–108)
POTASSIUM SERPL-SCNC: 3.8 MMOL/L (ref 3.5–5.1)
RBC # BLD AUTO: 3.06 X10^6/UL (ref 3.5–5.4)
SAO2 % BLDA: 98 % (ref 92–99)
SODIUM SERPL-SCNC: 137 MMOL/L (ref 136–145)
WBC # BLD AUTO: 10 X10^3/UL (ref 4–11)

## 2020-06-18 RX ADMIN — IPRATROPIUM BROMIDE AND ALBUTEROL SULFATE SCH ML: .5; 3 SOLUTION RESPIRATORY (INHALATION) at 11:37

## 2020-06-18 RX ADMIN — INSULIN LISPRO SCH UNITS: 100 INJECTION, SOLUTION INTRAVENOUS; SUBCUTANEOUS at 01:20

## 2020-06-18 RX ADMIN — IPRATROPIUM BROMIDE AND ALBUTEROL SULFATE SCH ML: .5; 3 SOLUTION RESPIRATORY (INHALATION) at 15:31

## 2020-06-18 RX ADMIN — DILTIAZEM HYDROCHLORIDE SCH MLS/HR: 5 INJECTION INTRAVENOUS at 22:23

## 2020-06-18 RX ADMIN — ACETYLCYSTEINE SCH MG: 200 INHALANT RESPIRATORY (INHALATION) at 07:49

## 2020-06-18 RX ADMIN — CIPROFLOXACIN SCH MLS/HR: 2 INJECTION, SOLUTION INTRAVENOUS at 22:24

## 2020-06-18 RX ADMIN — Medication PRN EACH: at 15:01

## 2020-06-18 RX ADMIN — ACETYLCYSTEINE SCH MG: 200 INHALANT RESPIRATORY (INHALATION) at 20:03

## 2020-06-18 RX ADMIN — METOPROLOL TARTRATE PRN MG: 5 INJECTION INTRAVENOUS at 10:14

## 2020-06-18 RX ADMIN — INSULIN LISPRO SCH UNITS: 100 INJECTION, SOLUTION INTRAVENOUS; SUBCUTANEOUS at 06:00

## 2020-06-18 RX ADMIN — IPRATROPIUM BROMIDE AND ALBUTEROL SULFATE SCH ML: .5; 3 SOLUTION RESPIRATORY (INHALATION) at 04:35

## 2020-06-18 RX ADMIN — IPRATROPIUM BROMIDE AND ALBUTEROL SULFATE SCH ML: .5; 3 SOLUTION RESPIRATORY (INHALATION) at 07:48

## 2020-06-18 RX ADMIN — PANTOPRAZOLE SODIUM SCH MG: 40 INJECTION, POWDER, FOR SOLUTION INTRAVENOUS at 08:15

## 2020-06-18 RX ADMIN — DILTIAZEM HYDROCHLORIDE SCH MLS/HR: 5 INJECTION INTRAVENOUS at 08:17

## 2020-06-18 RX ADMIN — IPRATROPIUM BROMIDE AND ALBUTEROL SULFATE SCH ML: .5; 3 SOLUTION RESPIRATORY (INHALATION) at 20:00

## 2020-06-18 RX ADMIN — PROCHLORPERAZINE EDISYLATE PRN MG: 5 INJECTION INTRAMUSCULAR; INTRAVENOUS at 12:15

## 2020-06-18 RX ADMIN — MAGNESIUM SULFATE IN WATER PRN MLS/HR: 40 INJECTION, SOLUTION INTRAVENOUS at 10:57

## 2020-06-18 RX ADMIN — INSULIN LISPRO SCH UNITS: 100 INJECTION, SOLUTION INTRAVENOUS; SUBCUTANEOUS at 18:00

## 2020-06-18 RX ADMIN — INSULIN LISPRO SCH UNITS: 100 INJECTION, SOLUTION INTRAVENOUS; SUBCUTANEOUS at 11:46

## 2020-06-18 RX ADMIN — DILTIAZEM HYDROCHLORIDE SCH MLS/HR: 5 INJECTION INTRAVENOUS at 10:57

## 2020-06-18 RX ADMIN — ACETAMINOPHEN PRN MG: 650 SUPPOSITORY RECTAL at 10:16

## 2020-06-18 NOTE — PDOC
Provider Note


Provider Note


Date: 6/18/2020


No acute changes.  Has had some decreased output from the drains


Continue supportive care no new surgical recommendations





Justicifation of Admission Dx:


Justifications for Admission:


Justification of Admission Dx:  Yes











OVIDIO MEDINA MD             Jun 18, 2020 09:46

## 2020-06-18 NOTE — NUR
Pharmacy TPN Dosing Note



S: JESENIA BEAN is a 49 year old F Currently receiving Central Continuous TPN started 
03/18/20



B:Pertinent PMH: Necrotizing pancreatitis

Height: 5 feet, 8 inches

Weight: 88.860765 kg



Current diet: NPO 



LABS:

Sodium:    137 

Potassium: 3.8 

Chloride:  106 

Calcium:   8.9 

Corrected Calcium: 11.30 

Magnesium: 1.7 

CO2:       25 

SCr:       0.6 

Glucose:   137-159 

Albumin:   1.0 

AST:       62 

ALT:       72 



TPN FORMULA:

TPN TYPE:  Central Continuous

AMINO ACIDS:         80 gm

DEXTROSE:            250 gm

LIPIDS:              20 gm

POTASSIUM ACETATE:   60 mEq

MAGNESIUM:           14 mEq

INSULIN:             20 units

MULTIPLE VITAMIN:    10 ml

TRACE ELEMENTS:      1 ml(s)



TPN PLAN:  

Magnesium 1.7. Replace with MagSul 2 gm IVPB and increase to 14 meq in

TPN. Increase AA based on recommendations from RD.

-CMP in AM per ID.





R: Change TPN as noted above.

Will monitor electrolytes, glucose, and tolerance to TPN.



 LORENZO LESLIE [facility], 06/18/20 1555

## 2020-06-18 NOTE — PDOC
PULMONARY PROGRESS NOTES


Subjective


off vent / , no distress


Patient intubated on 3/23 , s/p trach 4/6, 


transferred back to ICU 6/5 for syncope with collapse


developed anemia, blood drainage from RLQ abdomen drain site, and surrounding 

firmness  / developed septic shock 6/7 from abdomen source, required levo 6/7


s/p 3 new drains 6/7 with brown color drainage


Vitals





Vital Signs








  Date Time  Temp Pulse Resp B/P (MAP) Pulse Ox O2 Delivery O2 Flow Rate FiO2


 


6/18/20 11:00  119 25 105/50 (68) 99 Tracheal Collar  


 


6/18/20 10:00 101.5       





 101.5       








Comments


awake,no soa


General:  Alert, No acute distress


HEENT:  Other (nc at perrl   nose clear    neck  trach site ok   no lab  no 

thyromegaly)


Lungs:  Other (diminshed in bases, Rhonci in LLL)


Cardiovascular:  S1, S2


Abdomen:  Soft, Non-tender, Other (bleeding from Sx. site RLQ and firmness)


Extremities:  Other (+1 BLE edema)


Skin:  Warm, Dry


Labs





Laboratory Tests








Test


 6/16/20


12:19 6/17/20


00:23 6/17/20


06:00 6/17/20


06:13


 


Glucose (Fingerstick)


 145 mg/dL


(70-99) 95 mg/dL


(70-99) 


 108 mg/dL


(70-99)


 


White Blood Count


 


 


 8.0 x10^3/uL


(4.0-11.0) 





 


Red Blood Count


 


 


 3.14 x10^6/uL


(3.50-5.40) 





 


Hemoglobin


 


 


 8.9 g/dL


(12.0-15.5) 





 


Hematocrit


 


 


 26.7 %


(36.0-47.0) 





 


Mean Corpuscular Volume   85 fL ()  


 


Mean Corpuscular Hemoglobin   29 pg (25-35)  


 


Mean Corpuscular Hemoglobin


Concent 


 


 34 g/dL


(31-37) 





 


Red Cell Distribution Width


 


 


 18.3 %


(11.5-14.5) 





 


Platelet Count


 


 


 424 x10^3/uL


(140-400) 





 


Neutrophils (%) (Auto)   73 % (31-73)  


 


Lymphocytes (%) (Auto)   22 % (24-48)  


 


Monocytes (%) (Auto)   4 % (0-9)  


 


Eosinophils (%) (Auto)   1 % (0-3)  


 


Basophils (%) (Auto)   0 % (0-3)  


 


Neutrophils # (Auto)


 


 


 5.8 x10^3/uL


(1.8-7.7) 





 


Lymphocytes # (Auto)


 


 


 1.7 x10^3/uL


(1.0-4.8) 





 


Monocytes # (Auto)


 


 


 0.3 x10^3/uL


(0.0-1.1) 





 


Eosinophils # (Auto)


 


 


 0.1 x10^3/uL


(0.0-0.7) 





 


Basophils # (Auto)


 


 


 0.0 x10^3/uL


(0.0-0.2) 





 


Sodium Level


 


 


 140 mmol/L


(136-145) 





 


Potassium Level


 


 


 3.4 mmol/L


(3.5-5.1) 





 


Chloride Level


 


 


 106 mmol/L


() 





 


Carbon Dioxide Level


 


 


 25 mmol/L


(21-32) 





 


Anion Gap   9 (6-14)  


 


Blood Urea Nitrogen


 


 


 23 mg/dL


(7-20) 





 


Creatinine


 


 


 0.7 mg/dL


(0.6-1.0) 





 


Estimated GFR


(Cockcroft-Gault) 


 


 88.9 


 





 


BUN/Creatinine Ratio   33 (6-20)  


 


Glucose Level


 


 


 121 mg/dL


(70-99) 





 


Calcium Level


 


 


 9.0 mg/dL


(8.5-10.1) 





 


Magnesium Level


 


 


 1.7 mg/dL


(1.8-2.4) 





 


Total Bilirubin


 


 


 0.6 mg/dL


(0.2-1.0) 





 


Aspartate Amino Transf


(AST/SGOT) 


 


 18 U/L (15-37) 


 





 


Alanine Aminotransferase


(ALT/SGPT) 


 


 27 U/L (14-59) 


 





 


Alkaline Phosphatase


 


 


 110 U/L


() 





 


Total Protein


 


 


 4.5 g/dL


(6.4-8.2) 





 


Albumin


 


 


 1.0 g/dL


(3.4-5.0) 





 


Albumin/Globulin Ratio   0.3 (1.0-1.7)  


 


Test


 6/17/20


07:45 6/17/20


11:59 6/17/20


18:34 6/18/20


01:35


 


O2 Saturation 98 % (92-99)    


 


Arterial Blood pH


 7.47


(7.35-7.45) 


 


 





 


Arterial Blood pCO2 at


Patient Temp 27 mmHg


(35-46) 


 


 





 


Arterial Blood pO2 at Patient


Temp 109 mmHg


() 


 


 





 


Arterial Blood HCO3


 19 mmol/L


(21-28) 


 


 





 


Arterial Blood Base Excess


 -4 mmol/L


(-3-3) 


 


 





 


FiO2 35    


 


Glucose (Fingerstick)


 


 146 mg/dL


(70-99) 167 mg/dL


(70-99) 159 mg/dL


(70-99)


 


Test


 6/18/20


06:20 6/18/20


08:04 


 





 


White Blood Count


 10.0 x10^3/uL


(4.0-11.0) 


 


 





 


Red Blood Count


 3.06 x10^6/uL


(3.50-5.40) 


 


 





 


Hemoglobin


 8.7 g/dL


(12.0-15.5) 


 


 





 


Hematocrit


 26.0 %


(36.0-47.0) 


 


 





 


Mean Corpuscular Volume 85 fL ()    


 


Mean Corpuscular Hemoglobin 28 pg (25-35)    


 


Mean Corpuscular Hemoglobin


Concent 33 g/dL


(31-37) 


 


 





 


Red Cell Distribution Width


 18.2 %


(11.5-14.5) 


 


 





 


Platelet Count


 469 x10^3/uL


(140-400) 


 


 





 


Neutrophils (%) (Auto) 81 % (31-73)    


 


Lymphocytes (%) (Auto) 13 % (24-48)    


 


Monocytes (%) (Auto) 5 % (0-9)    


 


Eosinophils (%) (Auto) 1 % (0-3)    


 


Basophils (%) (Auto) 0 % (0-3)    


 


Neutrophils # (Auto)


 8.1 x10^3/uL


(1.8-7.7) 


 


 





 


Lymphocytes # (Auto)


 1.3 x10^3/uL


(1.0-4.8) 


 


 





 


Monocytes # (Auto)


 0.4 x10^3/uL


(0.0-1.1) 


 


 





 


Eosinophils # (Auto)


 0.1 x10^3/uL


(0.0-0.7) 


 


 





 


Basophils # (Auto)


 0.0 x10^3/uL


(0.0-0.2) 


 


 





 


Sodium Level


 137 mmol/L


(136-145) 


 


 





 


Potassium Level


 3.8 mmol/L


(3.5-5.1) 


 


 





 


Chloride Level


 106 mmol/L


() 


 


 





 


Carbon Dioxide Level


 24 mmol/L


(21-32) 


 


 





 


Anion Gap 7 (6-14)    


 


Blood Urea Nitrogen


 20 mg/dL


(7-20) 


 


 





 


Creatinine


 0.6 mg/dL


(0.6-1.0) 


 


 





 


Estimated GFR


(Cockcroft-Gault) 106.3 


 


 


 





 


Glucose Level


 137 mg/dL


(70-99) 


 


 





 


Calcium Level


 8.8 mg/dL


(8.5-10.1) 


 


 





 


Magnesium Level


 1.7 mg/dL


(1.8-2.4) 


 


 





 


O2 Saturation  98 % (92-99)   


 


Arterial Blood pH


 


 7.49


(7.35-7.45) 


 





 


Arterial Blood pCO2 at


Patient Temp 


 28 mmHg


(35-46) 


 





 


Arterial Blood pO2 at Patient


Temp 


 116 mmHg


() 


 





 


Arterial Blood HCO3


 


 21 mmol/L


(21-28) 


 





 


Arterial Blood Base Excess


 


 -2 mmol/L


(-3-3) 


 





 


FiO2  35%   








Laboratory Tests








Test


 6/17/20


11:59 6/17/20


18:34 6/18/20


01:35 6/18/20


06:20


 


Glucose (Fingerstick)


 146 mg/dL


(70-99) 167 mg/dL


(70-99) 159 mg/dL


(70-99) 





 


White Blood Count


 


 


 


 10.0 x10^3/uL


(4.0-11.0)


 


Red Blood Count


 


 


 


 3.06 x10^6/uL


(3.50-5.40)


 


Hemoglobin


 


 


 


 8.7 g/dL


(12.0-15.5)


 


Hematocrit


 


 


 


 26.0 %


(36.0-47.0)


 


Mean Corpuscular Volume    85 fL () 


 


Mean Corpuscular Hemoglobin    28 pg (25-35) 


 


Mean Corpuscular Hemoglobin


Concent 


 


 


 33 g/dL


(31-37)


 


Red Cell Distribution Width


 


 


 


 18.2 %


(11.5-14.5)


 


Platelet Count


 


 


 


 469 x10^3/uL


(140-400)


 


Neutrophils (%) (Auto)    81 % (31-73) 


 


Lymphocytes (%) (Auto)    13 % (24-48) 


 


Monocytes (%) (Auto)    5 % (0-9) 


 


Eosinophils (%) (Auto)    1 % (0-3) 


 


Basophils (%) (Auto)    0 % (0-3) 


 


Neutrophils # (Auto)


 


 


 


 8.1 x10^3/uL


(1.8-7.7)


 


Lymphocytes # (Auto)


 


 


 


 1.3 x10^3/uL


(1.0-4.8)


 


Monocytes # (Auto)


 


 


 


 0.4 x10^3/uL


(0.0-1.1)


 


Eosinophils # (Auto)


 


 


 


 0.1 x10^3/uL


(0.0-0.7)


 


Basophils # (Auto)


 


 


 


 0.0 x10^3/uL


(0.0-0.2)


 


Sodium Level


 


 


 


 137 mmol/L


(136-145)


 


Potassium Level


 


 


 


 3.8 mmol/L


(3.5-5.1)


 


Chloride Level


 


 


 


 106 mmol/L


()


 


Carbon Dioxide Level


 


 


 


 24 mmol/L


(21-32)


 


Anion Gap    7 (6-14) 


 


Blood Urea Nitrogen


 


 


 


 20 mg/dL


(7-20)


 


Creatinine


 


 


 


 0.6 mg/dL


(0.6-1.0)


 


Estimated GFR


(Cockcroft-Gault) 


 


 


 106.3 





 


Glucose Level


 


 


 


 137 mg/dL


(70-99)


 


Calcium Level


 


 


 


 8.8 mg/dL


(8.5-10.1)


 


Magnesium Level


 


 


 


 1.7 mg/dL


(1.8-2.4)


 


Test


 6/18/20


08:04 


 


 





 


O2 Saturation 98 % (92-99)    


 


Arterial Blood pH


 7.49


(7.35-7.45) 


 


 





 


Arterial Blood pCO2 at


Patient Temp 28 mmHg


(35-46) 


 


 





 


Arterial Blood pO2 at Patient


Temp 116 mmHg


() 


 


 





 


Arterial Blood HCO3


 21 mmol/L


(21-28) 


 


 





 


Arterial Blood Base Excess


 -2 mmol/L


(-3-3) 


 


 





 


FiO2 35%    








Medications





Active Scripts








 Medications  Dose


 Route/Sig


 Max Daily Dose Days Date Category


 


 Bisoprolol


 Fumarate 5 Mg


 Tablet  10 Mg


 PO DAILY


   3/16/20 Reported








Comments


CXR 6/1k


 


improved right effusion/ atelectasis














ct abdomen /pelvis 6/6


1. Removal of the percutaneous pigtail drainage catheters since the prior 


exam. Sequela of pancreatitis with extensive pseudocysts again 


demonstrated, the right-sided collections are slightly larger since the 


prior exam, the left-sided collections are stable. See above.


2. Moderate to large left pleural effusion with atelectasis and collapse 


of most of the left lower lobe, stable. Small right pleural effusion is 


stable.


3. Gallstone.





ct chest 6/15 reviewed





Impression


.


IMPRESSION:


1.  Acute hypoxemic respiratory failure secondary to ARDS status post trach, 

developed anemia 6/7, blood drainage from RLQ abdomen drain site, and 

surrounding firmness  / developed septic shock 6/7 from abdomen source, required

levo 6/7


s/p 3 new drains 6/7 with brown color drainage, back on vent 6/13, off vent 

on/off since 6/15.


2.  Gallstone pancreatitis, now with ongoing bleeding from prior drain. Anemic. 

s/p Tx multiple units over several days


3.  septic shock/sepsis, recurrent 6/7, source abdomen. , off levo now, 


4.  Acute kidney injury-, Off HD--renal function decling. suspect JED on CKD due

to hypotension , improved now


5.  Acute gallstone pancreatitis.


6.  Hypoalbuminemia.


7.  Moderate persistent effusions, s/p left thora  5/12, reaccumulation of left 

effusion. O2 requirement not changed. 


8.  Fever- ,hypotension. suspect recurrent sepsis/ likely pancreatic source.  

Per ID, per surgery--


9.  Chronic anemia-- ongoing / s/p PRBC


10. Covid 19 testing negative


11. Moderate to large ascites-S/P paracentisis


12.S/P paracentisis with 4 liters removed on 4/15/20


13. S/P IR drain placement on 5/8/2020, removal, re inserted 6/7


14. Depression/Anxiety 


15. Increase left effusion, ? loculated/ s/p left chest tube.. increase right 

effusion





Plan


.


1. TS . follow results of  thoracentesis,  titrate fio2 to keep sat 94%, 


2. Follow all cultures/ PSA from pelvic drains. Abx per ID


3. Follow surgery recs-- Acute pancreatitis with persistent necrosis ---- 4/27 

status post ROBERT drain placement + C paropsilosis; 5/6 + yeast & high amylase; s/p

additional drains on 5/8, removal of drains and now increase pseudocyst and 

increase fluid collection. likely infected fluid/ sepsis. on BS abx.follow 

surgery rec, planning surgical intervention next few weeks


4. Follow ID recs for ABX----


5. Follow nephrology recs -----


6. Continue TPN 


7. monitor HGB,  4 units since 6/6--monitor HGB transfuse if less than 8.5 


8 s/p  thoracentesis, 5/12, 3 litres removed, 6/15. 800 cc so far. Monitor chest

tube


9. Pt remains critically ill from severe necrotic pancreatis. Even with surgery 

prognosis grim. Surgery informed family.





     


DVT/GI PPX: protonix--- holding lovenox 2/2 anemia


PT/OT


D/W RN, rt and


d/w ID


critically ill    cc time 30 min no overlap











SHARYN SOLORZANO MD                 Jun 18, 2020 11:31

## 2020-06-18 NOTE — PDOC
Objective:


Objective:


D/w nurse - had some sedation w/ vent overnight, plans to try trach collar and 

get up to chair.  Stooled.  Some drainage from left drain site.


Vital Signs:





                                   Vital Signs








  Date Time  Temp Pulse Resp B/P (MAP) Pulse Ox O2 Delivery O2 Flow Rate FiO2


 


6/18/20 10:15   36  98 Tracheal Collar  


 


6/18/20 10:14  144  131/57    


 


6/18/20 08:00 100.2       





 100.2       








Labs:





Laboratory Tests








Test


 6/17/20


11:59 6/17/20


18:34 6/18/20


01:35 6/18/20


06:20


 


Glucose (Fingerstick) 146 mg/dL  167 mg/dL  159 mg/dL  


 


White Blood Count    10.0 x10^3/uL 


 


Red Blood Count    3.06 x10^6/uL 


 


Hemoglobin    8.7 g/dL 


 


Hematocrit    26.0 % 


 


Mean Corpuscular Volume    85 fL 


 


Mean Corpuscular Hemoglobin    28 pg 


 


Mean Corpuscular Hemoglobin


Concent 


 


 


 33 g/dL 





 


Red Cell Distribution Width    18.2 % 


 


Platelet Count    469 x10^3/uL 


 


Neutrophils (%) (Auto)    81 % 


 


Lymphocytes (%) (Auto)    13 % 


 


Monocytes (%) (Auto)    5 % 


 


Eosinophils (%) (Auto)    1 % 


 


Basophils (%) (Auto)    0 % 


 


Neutrophils # (Auto)    8.1 x10^3/uL 


 


Lymphocytes # (Auto)    1.3 x10^3/uL 


 


Monocytes # (Auto)    0.4 x10^3/uL 


 


Eosinophils # (Auto)    0.1 x10^3/uL 


 


Basophils # (Auto)    0.0 x10^3/uL 


 


Sodium Level    137 mmol/L 


 


Potassium Level    3.8 mmol/L 


 


Chloride Level    106 mmol/L 


 


Carbon Dioxide Level    24 mmol/L 


 


Anion Gap    7 


 


Blood Urea Nitrogen    20 mg/dL 


 


Creatinine    0.6 mg/dL 


 


Estimated GFR


(Cockcroft-Gault) 


 


 


 106.3 





 


Glucose Level    137 mg/dL 


 


Calcium Level    8.8 mg/dL 


 


Magnesium Level    1.7 mg/dL 


 


Test


 6/18/20


08:04 


 


 





 


O2 Saturation 98 %    


 


Arterial Blood pH 7.49    


 


Arterial Blood pCO2 at


Patient Temp 28 mmHg 


 


 


 





 


Arterial Blood pO2 at Patient


Temp 116 mmHg 


 


 


 





 


Arterial Blood HCO3 21 mmol/L    


 


Arterial Blood Base Excess -2 mmol/L    


 


FiO2 35%    








  GRAM STAIN  Final  


        Final





        NO ORGANISMS SEEN.


        SQUAMOUS EPI CELL:NOT APPLICABLE


        PMN (WBCs):RARE





  ANAEROBIC-AEROBIC CULTURE  Preliminary  


        Preliminary


Imaging:


CXR 6/18


IMPRESSION:  


1. Stable life support devices.


2. Improving right basilar opacities.


3. Small bilateral pleural effusions.





PE:





GEN: NAD


LUNGS: trach


HEART: tachycardic


ABD: drains w/ scant brownish drainage


NEURO/PSYCH: awake and alert, waves to me





A/P:


Complicated pancreatitis


Resp failure


MDR pseudomonas





--


Continue support.





Justicifation of Admission Dx:


Justifications for Admission:


Justification of Admission Dx:  Yes











RAGHAVENDRA MURCIA         Jun 18, 2020 10:45

## 2020-06-18 NOTE — PDOC
Infectious Disease Note


Subjective


Subjective


Doing ok. A little nausea at times. pain ok


Continues to have blood stained drainage from drains


no fevers last 24 hours





Vital Sign


Vital Signs





Vital Signs








  Date Time  Temp Pulse Resp B/P (MAP) Pulse Ox O2 Delivery O2 Flow Rate FiO2


 


6/18/20 04:35     100 Ventilator  


 


6/18/20 04:00        


 


6/18/20 02:00  100 20     


 


6/18/20 00:00 99.1       





 99.1       











Physical Exam


PHYSICAL EXAM





GENERAL: More Alert and looks comfortable


HEENT: NGT in place. Oral mucosa dry


NECK:  Tracheostomy 


LUNGS: Diminished aeration bases, no accessory muscle use - CT on left with 

clear fluid


HEART:  S1, S2, tachy, regular 


ABDOMEN: Mild distention, bowel sounds present, soft, grimaces to palpation 


Right lateral side drainage bag, 3 drains with bloodstained drainage.  Left side

with drainage from previous drain site - dressed - clean and dry


: [place] ( 6/ 7)


EXTREMITIES: Trace edema .no  cyanosis. Mild erythema on RUE


SKIN: no signs of gen rash 


CNS: Lethargic very weak


LUE-PICC (5/29) without signs of complications - art line without complications





Labs


Lab





Laboratory Tests








Test


 6/17/20


07:45 6/17/20


11:59 6/17/20


18:34 6/18/20


01:35


 


O2 Saturation 98 % (92-99)    


 


Arterial Blood pH


 7.47


(7.35-7.45) 


 


 





 


Arterial Blood pCO2 at


Patient Temp 27 mmHg


(35-46) 


 


 





 


Arterial Blood pO2 at Patient


Temp 109 mmHg


() 


 


 





 


Arterial Blood HCO3


 19 mmol/L


(21-28) 


 


 





 


Arterial Blood Base Excess


 -4 mmol/L


(-3-3) 


 


 





 


FiO2 35    


 


Glucose (Fingerstick)


 


 146 mg/dL


(70-99) 167 mg/dL


(70-99) 159 mg/dL


(70-99)


 


Test


 6/18/20


06:20 


 


 





 


White Blood Count


 10.0 x10^3/uL


(4.0-11.0) 


 


 





 


Red Blood Count


 3.06 x10^6/uL


(3.50-5.40) 


 


 





 


Hemoglobin


 8.7 g/dL


(12.0-15.5) 


 


 





 


Hematocrit


 26.0 %


(36.0-47.0) 


 


 





 


Mean Corpuscular Volume 85 fL ()    


 


Mean Corpuscular Hemoglobin 28 pg (25-35)    


 


Mean Corpuscular Hemoglobin


Concent 33 g/dL


(31-37) 


 


 





 


Red Cell Distribution Width


 18.2 %


(11.5-14.5) 


 


 





 


Platelet Count


 469 x10^3/uL


(140-400) 


 


 





 


Neutrophils (%) (Auto) 81 % (31-73)    


 


Lymphocytes (%) (Auto) 13 % (24-48)    


 


Monocytes (%) (Auto) 5 % (0-9)    


 


Eosinophils (%) (Auto) 1 % (0-3)    


 


Basophils (%) (Auto) 0 % (0-3)    


 


Neutrophils # (Auto)


 8.1 x10^3/uL


(1.8-7.7) 


 


 





 


Lymphocytes # (Auto)


 1.3 x10^3/uL


(1.0-4.8) 


 


 





 


Monocytes # (Auto)


 0.4 x10^3/uL


(0.0-1.1) 


 


 





 


Eosinophils # (Auto)


 0.1 x10^3/uL


(0.0-0.7) 


 


 





 


Basophils # (Auto)


 0.0 x10^3/uL


(0.0-0.2) 


 


 





 


Sodium Level


 137 mmol/L


(136-145) 


 


 





 


Potassium Level


 3.8 mmol/L


(3.5-5.1) 


 


 





 


Chloride Level


 106 mmol/L


() 


 


 





 


Carbon Dioxide Level


 24 mmol/L


(21-32) 


 


 





 


Anion Gap 7 (6-14)    


 


Blood Urea Nitrogen


 20 mg/dL


(7-20) 


 


 





 


Creatinine


 0.6 mg/dL


(0.6-1.0) 


 


 





 


Estimated GFR


(Cockcroft-Gault) 106.3 


 


 


 





 


Glucose Level


 137 mg/dL


(70-99) 


 


 





 


Calcium Level


 8.8 mg/dL


(8.5-10.1) 


 


 





 


Magnesium Level


 1.7 mg/dL


(1.8-2.4) 


 


 











Micro


6/7 





GRAM STAIN  Final  


        Final





        GRAM NEGATIVE RODS:MODERATE


        SQUAMOUS EPI CELL:NOT APPLICABLE


        PMN (WBCs):RARE


        YEAST:MODERATE


        Unless otherwise specified, Testing Performed by:


        [address] [address]


        For Inquires, the Physician may contact the Microbiology


        department at [phone]





  ANAEROBIC-AEROBIC CULTURE  Preliminary  


        Preliminary





        MANY GRAM NEGATIVE RODS on 06/09/20 at 1153


        FINAL ID= [PSEUDOMONAS AERUGINOSA]


        PSEUDOMONAS AERUGINOSA





  ANTIMICROBIAL SUSCEPTIBILITY  Preliminary  


        Comment





        NEG ANSON 56


        PSEUDOMONAS AERUGINOSA


        ANTIBIOTIC                        RESULT          INTERPRETATION


        AMIKACIN                          <=16                  S


        AZTREONAM                         >16                   R


        CEFTAZIDIME                       >16                   R


        CIPROFLOXACIN                     <=0.25                S


        CEFEPIME                          16                    I


        CEFTAZIDIME/AVIBACTAM             <=4                   S


        GENTAMICIN                        <=2                   S














                               ** CONTINUED ON NEXT PAGE **





 

--------------------------------------------------------------------------------


------------





RUN DATE: 06/11/20                  [facility]               

  PAGE 2   


RUN TIME: 1016                            Specimen Inquiry                    


 

--------------------------------------------------------------------------------


------------





SPEC: [id number]    PATIENT: GENEVAJESENIA                [id number]  

(Continued)


------------------------------

--------------------------------------------------------------








---------------------------------------------------------------

-----------------------------





  Procedure                         Result                                      

         


------

--------------------------------------------------------------------------------


------





  ANTIMICROBIAL SUSCEPTIBILITY  Preliminary   (continued)


        LEVOFLOXACIN                      <=0.5                 S


        MEROPENEM                         <=1                   S


        PIPERACILLIN/TAZOBACTAM           64                    S


        TOBRAMYCIN                        <=2                   S


        Unless otherwise specified, Testing Performed by:


        [address] [address]


        For Inquires, the Physician may contact the Microbiology


        department at [phone]











CT Scan 6/6





IMPRESSION:


1. Removal of the percutaneous pigtail drainage catheters since the prior 


exam. Sequela of pancreatitis with extensive pseudocysts again 


demonstrated, the right-sided collections are slightly larger since the 


prior exam, the left-sided collections are stable. See above.


2. Moderate to large left pleural effusion with atelectasis and collapse 


of most of the left lower lobe, stable. Small right pleural effusion is 


stable.


3. Gallstone.





Objective


Assessment


Patient with prolonged hospitalization


Multiple medical problems


Multiple surgical procedures





S/p CT on left 6/15 890 ml overnight


6/7 fluid cult PSAE (MDRO),yeast moderate s/p drain times three R Cefepime, 

Zosyn ANSON < 64 - getting Tpa to some


CXR with Left lung white-out 6/13 ? effusion PSA 6/15 in sputum I Meropenem


Acute hypoxic resp failure on 35 % and 5 PEEP


Transaminitis - mild


Fever improving


Acute pancreatitis with persistent  necrosis


CT a/p 4/9


    Increased ascites. Persistent evidence of necrotizing pancreatitis with 

fluid and phlegmon


   at the pancreas


   4/27 status post ROBERT drain placement; yeast


   5/6 fluid  candida parapsilosis fluid amylase high


CT 6/7


IMPRESSION:


1.   Sequela of pancreatitis with extensive pseudocysts again 


demonstrated, the right-sided collections are slightly larger since the 


prior exam, the left-sided collections are stable. 








Cholelithiasis with thickening of the gallbladder wall.


Leucocytosis - better


JED,Hyperkalemia, Metabolic acidosis off dialysis


Acute hypoxic resp failure ,bilateral pleural effusion and atelectasis


hypocalcemia 


Prediabetes


HTN


s/p trach





S/p thoracenteis 5/12





Plan


Plan of Care











Consider d/c art- line placed 6/7


Monitor RUE mild erythema


Fluid balance per primary - up close to 14 kg


Sputum from 6/13 - growing PSA - may be colonization I to Meropenem and 

clinically stable from resp standpoint.  Will try to not treat so as to conserve

abx and avoid resistance but if worsens will add Cipro





Continue meropenem, has MDRP PSAE June 7 from abd


cont micafungin June 7


Follow-up cultures fluid for yeast


IR eval abd drains


Monitor a.m. labs - CMP/CBC


General surgery following


Monitor drain output


Maintain aspiration precaution


Supportive care





[facility] micro [phone]








Critically ill








D/w nursing











RASHAWN ROSEN MD              Jun 18, 2020 07:28

## 2020-06-18 NOTE — RAD
PORTABLE CHEST 1V 

 

INDICATION: Reason: Vent, Pleural Effusion 104 / Spl. Instructions:  / 

History: . 

 

COMPARISON STUDY: 6/17/2020.

 

FINDINGS:

 

Life Support Devices: Stable tracheostomy, enteric tube, left PICC, left 

pleural catheter.

 

Lungs: Low lung volume. Improving right basilar opacities. Normal 

pulmonary vasculature.

 

Pleura: Small bilateral pleural effusions.

 

Heart and Mediastinum: Stable cardiomediastinal silhouette and great 

vessels. 

 

IMPRESSION:  

1. Stable life support devices.

2. Improving right basilar opacities.

3. Small bilateral pleural effusions.

 

Electronically signed by: Anival Moody MD (6/18/2020 8:26 AM) [id number]

## 2020-06-18 NOTE — PDOC
PROGRESS NOTES


Chief Complaint


Chief Complaint


impression =================











History of Present Illness


50yo F w/ PMHx HTN, prediabetes who presented the emergency room complaints of 

abdominal pain. Patient described off and on 3 days. She states is constant, 

described as a squeezing sensation in a band-like distribution. + nausea, 

vomiting.  She denies any fever or diarrhea.  Patient denies any abdominal 

surgical procedures.  She stateed is worse with movements, car ride.  Pain 

initially was upper abdomen however now pretty much generalized.  Last bowel 

movement was 3/15/2020. Nothing makes her pain better.  Patient denies any 

shortness of breath.  She does state the pain moves into her chest.  Denies any 

headache or visual changes.


Lipase 90074, , , Bilirubin 1.4.


CT abdomen confirms pancreatic inflammation, peripancreatic fluid and 

inflammatory changes around the pancreas consistent with pancreatitis. 

Cholelithiasis and 1.4cm uterine fibroid as well as possible left salpingitis. 

Admitted for further care





impression ============================











Acute hypoxic Respiratory failure required  mechanical ventilation


Tracheostomy


bilateral pleural effusions/pulm edema s/p Throacentesis on 6/15/2020


Severe Acute gallstone pancreatitis (not a surgical candidate at this time) with

necrosis


Acute kidney failure now requiring dialysis


Gallstones (Calculus of gallbladder with acute cholecystitis without 

obstruction)


HTN 


Intractable pain


Intractable nausea


Covid 19 negative. 


Acute on chronic anemia 


EEG: No seizure activityFever  - better currently - intermittent could be from 

underlying pancreatitis blood cults 5/4 - neg so far


? Ileus with vomiting


Abd distention - U/S and CT reviewed s/p 0.4 L of opaque, debris-containing 

ascites was removed 5/6


Acute pancreatitis with persistent necrosis


A large fluid collection in the pancreatic bed has slightly decreased in size, 

described below, the pancreas itself is difficult to  visualize, which could be 

due to necrosis or obscuration of pancreatic  parenchyma from the surrounding 

fluid collection.6/15 


- 4/27 status post ROBERT drain placement + C paropsilosis. s/p additional drains 

5/8


Anemia - S/p PRBCs


Cholelithiasis with thickening of the gallbladder wall.


Leucocytosis improving


JED, hyperkalemia, Metabolic acidosis off dialysis


hypocalcemia 


Prediabetes


HTN


s/p trach


ESRD on HD


Hyperglycemia


Moderate to large left pleural effusion with atelectasis and collapse  of most 

of the left lower lobe, stable








6/18 FEVER 101, BLOOD CULTURE X 2 





FEN - 


PPX - SCDs, off lovenox currently, high risk for thrombosis but in light of her 

recent anemia and need for transfusion the risks do not outweigh benefits at 

this time. will continue to evaluate on a daily basis


FULL CODE


Dispo - ICU, critically ill


BACK ON VENT 6/17  vent // no distress











37 MIN CC TIME





History of Present Illness


History of Present Illness


6/8: IR placed drain on 6/7. 4u PRBC after Hb drop. Hb 8.8 today. Off Levophed 

this morning. T-max 100.3. Much more lethargic today. CXR with left sided 

diffuse infiltrates.


6/9: Tachycardic overnight into the 140s. NGT clamped. On BIPAP currently. 

Drains with serosanguinous discharge. WBC 8, Tmax 99.6F.


6/10: Seen on trach shield in ICU.  Hypertensive and tachycardic. Labs stable. 

blood stained drainage from drains. Afebrile.


6/11: Seen on trach shield in ICU. She is a bit confused, drowsy, but when 

sitting up is conversational and confusion somewhat clears. She is asking for 

more pain medication. Stable drains, still very tachy.Na 147


6/12: Patient vomited overnight. Aspirated. Tried to pull her trach out, she was

told she would die without her trach, she said "I know, I just want to go home".

Hb 7.6. Afebrile, still very tachycardic. 1055ml out of right sided ROBERT drain


6/13: Overnight hypoxic, on BIPAP. CXR with left sided white out lung. 

Significant mucous plug suctioned by RT with improvement in her ABG after 2 

hours this morning. Not really active, tired, lethargic. 890ml out of drains 

past 24 hours. On vent. D/w daughter bedside.


6/14: Still on vent overnight with copious pulmonary drainage on suctioning. 

Labs stable, UOP stable 1L. Drain output still significant with 1505mL. She has 

shown her phone password [id number] and made it clear she does not want her daughters 

to access her phone at this time.


6:15: Patient quite frail, she has had such a lengthy hospital stay that she is 

certainly depressed and as documented 3 days ago is wanting to go home. Most 

likely patient is also tired of being hospitalized with an end point no where in

sight. Reassurance and encouragement provided during my visit. Plans for 

thoracentesis later in the day 


6/16: No acute events reported overnight, case discussed with nursing staff 

patient in no acute distress, complaints of the abdomimal pain during my visit, 

patient is quite depressed, reassurance has been provided, discussed with 

nursing staff at bedside, replace electrolytes 


6/17 off vent / on TS , no distress








6/17 /2020


Patient seen and examined ICU BED


critically ill 


concerned about her long-term prognosis 


Sputum from 6/13 - growing PSA - may be colonization I to Meropenem add Cipro


Continue meropenem, has MDRP PSAE June 7 from abd


cont micafungin June 7





33 MIN CC TIME








Date:  Apr 27, 2020





Pre-Op Diagnosis:


Necrotizing pancreatitis





Post-Op Diagnosis:


same





Procedure Performed:


laparoscopic exploration





Surgeon:


Frandy Flores


Asst:  Dr. Luis Santos





Anesthesia Type:


GETA plus local





Blood Loss:


50





Specimans Obtained:


cultures, debris





Findings:


1000 cc ascites suctioned off, cultures sent, diffuse debris, obliteration of 

surgical planes preventing any meaningful exploration























6/4/2020


Patient seen and examined in the ICU


She remains critically ill is is extremely weak


Chart reviewed


Discussed with RN


We decided to try some Prozac as she does seem depressed


On IV TPN


Still has NG tube








6/3/2020


Patient seen and examined in the ICU


She remains critically ill


We have been trying to hold off on her Ativan for the past 24 hours


She is a little more awake but shaky and agitated and anxious seems depressed


Discussed with RN


Chart reviewed








6/2/2020


Patient seen and examined in the ICU


She is a little more alert today but still quite ill


Appears clammy and pale and depressed/anxious


Discussed with RN


Chart reviewed











6/1/2020


Patient seen and examined in the ICU


She appears extremely ill


She is tachypneic at 35 respirations per minute and tachycardic at 132 bpm


She is extremely encephalopathic and shaky


She appears clammy


Chart reviewed


Discussed with RN


Prognosis extremely guarded at best








5/31/2020


Patient still in ICU


Resting with no apparent distress


Chart reviewed








5/30/2020


Patient seen and examined in the ICU


She is wiping her face with a cough


Discussed with RN


Chart reviewed


We hope to get her out of the ICU later today if possible








5/29/2020


Patient seen and examined in the ICU once again


She is back on NG suction


On IV Zosyn


Has IV TPN


Sedated with Precedex but anxious still


Appears somewhat clammy and pale


Chart reviewed


Discussed with RN


She remains critically ill











 


 


BRIEF OPERATIVE NOTE


Pre-Op Diagnosis


Pancreatitis with pseudocysts, suspected infection


Post-Op Diagnosis


same


Procedure Performed


CT abdominal Drains x 3


Surgeon


Hardy


Anesthesia Type:  Conscious Sedation


Findings


3 abdominal drains, 14F, with turbid pancreatic fluid and necrotic debris in 

each.


Complications


No immediate








5/9: Patient today somewhat restless and having bilious secretions from ET tube,

imaging studies ordered, discussed with consultant. Pretty poor prognosis, 

hopefully is not a fistula, poor surgical candidate. 





5/10: Imaging with no acute events, she seems more stable today compared to 

yesterday. Encouraged as much activity as possible patient at high risk for 

severe depression.





Vitals


Vitals





Vital Signs








  Date Time  Temp Pulse Resp B/P (MAP) Pulse Ox O2 Delivery O2 Flow Rate FiO2


 


6/18/20 09:00  112 20 140/56 (84) 100 Ventilator  


 


6/18/20 08:00 100.2       





 100.2       











Physical Exam


Physical Exam





GENERAL: More Alert and looks comfortable


HEENT: NGT in place. Oral mucosa dry


NECK:  Tracheostomy 


LUNGS: Diminished aeration bases, no accessory muscle use - CT on left with 

clear fluid


HEART:  S1, S2, tachy, regular 


ABDOMEN: Mild distention, bowel sounds present, soft, grimaces to palpation 


Right lateral side drainage bag, 3 drains with bloodstained drainage.  Left side

with drainage from previous drain site - dressed - clean and dry


: [place] ( 6/ 7)


EXTREMITIES: Trace edema .no  cyanosis. Mild erythema on RUE


SKIN: no signs of gen rash 


CNS: Lethargic very weak


LUE-PICC (5/29) without signs of complications - art line without complications


General:  Oriented X3, Cooperative, No acute distress


Heart:  Regular rate, Normal S1, Normal S2, No murmurs, Gallops


Lungs:  Other (diminshed in bases, Rhonci in LLL)


Abdomen:  Soft, Other (drains in place)


Extremities:  No clubbing, No cyanosis, No edema, Normal pulses, No tenderness/

swelling


Skin:  Other (warm, dry)





Labs


LABS


SEX: F                    EXAM DT: 06/15/20         ACCESSION#: [id number]


STATUS: ADM IN            ORD. PHYSICIAN: RASHAWN ROSEN MD


REASON: Pleural effusion and f/u abd abscesses


PROCEDURE: CT CHEST ABDOMEN PELVIS WO





EXAM: CT CHEST, ABDOMEN, AND PELVIS WITHOUT CONTRAST


 


INDICATION: Pleural effusion and follow-up abdominal abscess


 


COMPARISON:  CT abdomen and pelvis 6/6/2020 and 5/27/2020.


 


TECHNIQUE: Helical CT imaging performed of the chest, abdomen and pelvis 


without the use of intravenous contrast. Sagittal and coronal reformats 


were obtained. 


 


One or more of the following individualized dose reduction techniques were


utilized for this examination:  


 


1. Automated exposure control


2. Adjustment of the mA and/or kV according to patient size


3. Use of iterative reconstruction technique.


 


 


FINDINGS:


 


CHEST:


 


Thyroid gland and thoracic inlet: Visualized portion of the thyroid gland 


is normal.


 


Heart and great vessels: Heart is normal in size. No pericardial effusion.


Thoracic aorta is normal in caliber. A left PICC terminates at the 


superior cavoatrial junction.


 


Mediastinum and drew: No lymphadenopathy. A tracheostomy tube terminates 


at the level of the clavicular heads. A nasogastric tube terminates in the


gastric fundus.


 


Lungs and pleura: Slightly increased moderate to large left pleural 


effusion, predominantly layering with a small partially loculated 


component anterolaterally, and consolidation or atelectasis of the entire 


left lower lobe. Mild opacities in the left upper lobe. There is a small 


right pleural effusion with mild consolidative opacities, also mildly 


increased from prior exam. 


 


Chest wall and axillae: Bilateral breast implants are noted. No axillary 


lymphadenopathy.


 


Bones: No acute osseous abnormality.


 


 


ABDOMEN AND PELVIS:


 


Liver: Liver is normal in size. A 1.2 cm hypodensity in the medial left 


hepatic lobe is unchanged.


 


Gallbladder/Biliary Tree: Cholelithiasis is unchanged. No biliary duct 


dilatation.


 


Pancreas: A large fluid collection in the pancreatic bed has slightly 


decreased in size, described below, the pancreas itself is difficult to 


visualize, which could be due to necrosis or obscuration of pancreatic 


parenchyma from the surrounding fluid collection. Evaluation is also 


limited due to lack of intravenous contrast. There is no gas within the 


pancreatic bed.


 


Spleen: Grossly normal.


 


Adrenal Glands: Normal.


 


Kidneys/Ureters/Bladder: There is unchanged mild right hydronephrosis. No 


urolithiasis. Ureters not well visualized due to fluid collections. The 


left kidney is normal. Bladder is decompressed around a [place] catheter.


 


Reproductive Organs: Uterus is anteverted. No adnexal mass. 


 


Stomach, small bowel, and colon: Nasogastric tube terminates in the 


gastric fundus. Stomach, small bowel, and colon are not well evaluated due


to lack of IV and oral contrast and presence of multiple adjacent fluid 


collections. There is no evidence of bowel obstruction.


 


Vasculature: Abdominal aorta is normal in caliber.


 


Lymph Nodes: No lymphadenopathy.


 


Peritoneum and retroperitoneum: There are multiple large fluid collections


throughout the abdomen and pelvis, some of which are likely communicating 


with each other. The fluid collection in the pancreatic bed involving the 


lesser sac is slightly smaller, now measuring approximately 12.7 x 4.5 cm 


at the level of the lesser sac (image 38, series 4), previously 13.4 x 5.5


cm. There is a new pigtail catheter in this fluid collection.


 


This fluid collection likely communicates with a fluid collection 


involving the right retroperitoneal space and right psoas muscle. This 


fluid collection measures approximately 10.1 x 9.4 cm, previously 10.3 x 


9.7 cm. There is a new pigtail catheter in this collection.


 


A fluid collection in the left paracolic gutter involving the left psoas 


muscle has slightly decreased in size, this measures 10.4 x 6.7 cm at the 


level of the inferior left renal pole, previously 11.7 x 6.7 cm. There is 


a new pleural catheter in this collection.


 


There is a suspected additional fluid collection in the lower midpelvis 


between loops of bowel and abutting the bladder and uterus, measuring 


approximately 10.0 x 4.0 cm, unchanged. This could be confirmed by oral 


contrast to differentiate from a loop of bowel.


 


No definite new fluid collection. There is a persistent small amount of 


free fluid in the pelvis. 


No free air.


 


Bones: No acute osseous abnormality.


 


Miscellaneous: Moderate new anasarca.


 


 


IMPRESSION:


 


1.  Sequela of pancreatitis with extensive pseudocyst/fluid collections 


throughout the abdomen and pelvis including a large peripancreatic fluid 


collection and bilateral retroperitoneal collections involving the psoas 


muscles. Fluid collections are unchanged to minimally decreased from CT 


6/6/2020. There are 3 new pigtail drainage catheters in place. IV or oral 


contrast may be useful to better evaluate, if possible.


 


2. Slightly increased moderate to large partially loculated left pleural 


effusion with atelectasis or consolidation of the left lower lobe. 


Slightly increased small right pleural effusion and mild right pulmonary 


opacities.


 


3. Mild right hydronephrosis.


 


4. Cholelithiasis.


 


5. New anasarca.


 


Electronically signed by: Annette Bone MD (6/15/2020 10:37 AM) 


[id number]














DICTATED and SIGNED BY:     ANNETTE BONE MD


DATE:     06/15/20 1037





PORTABLE CHEST 1V 


 


INDICATION: Reason: Vent, Pleural Effusion 104 / Spl. Instructions:  / 


History: . 


 


COMPARISON STUDY: 6/17/2020.


 


FINDINGS:


 


Life Support Devices: Stable tracheostomy, enteric tube, left PICC, left 


pleural catheter.


 


Lungs: Low lung volume. Improving right basilar opacities. Normal 


pulmonary vasculature.


 


Pleura: Small bilateral pleural effusions.


 


Heart and Mediastinum: Stable cardiomediastinal silhouette and great 


vessels. 


 


IMPRESSION:  


1. Stable life support devices.


2. Improving right basilar opacities.


3. Small bilateral pleural effusions.


 


Electronically signed by: José Miguel Moody MD (6/18/2020 8:26 AM) [id number]














DICTATED and SIGNED BY:     JOSÉ MIGUEL MOODY MD


DATE:     06/18/20 0826


Laboratory Tests








Test


 6/17/20


11:59 6/17/20


18:34 6/18/20


01:35 6/18/20


06:20


 


Glucose (Fingerstick)


 146 mg/dL


(70-99) 167 mg/dL


(70-99) 159 mg/dL


(70-99) 





 


White Blood Count


 


 


 


 10.0 x10^3/uL


(4.0-11.0)


 


Red Blood Count


 


 


 


 3.06 x10^6/uL


(3.50-5.40)


 


Hemoglobin


 


 


 


 8.7 g/dL


(12.0-15.5)


 


Hematocrit


 


 


 


 26.0 %


(36.0-47.0)


 


Mean Corpuscular Volume    85 fL () 


 


Mean Corpuscular Hemoglobin    28 pg (25-35) 


 


Mean Corpuscular Hemoglobin


Concent 


 


 


 33 g/dL


(31-37)


 


Red Cell Distribution Width


 


 


 


 18.2 %


(11.5-14.5)


 


Platelet Count


 


 


 


 469 x10^3/uL


(140-400)


 


Neutrophils (%) (Auto)    81 % (31-73) 


 


Lymphocytes (%) (Auto)    13 % (24-48) 


 


Monocytes (%) (Auto)    5 % (0-9) 


 


Eosinophils (%) (Auto)    1 % (0-3) 


 


Basophils (%) (Auto)    0 % (0-3) 


 


Neutrophils # (Auto)


 


 


 


 8.1 x10^3/uL


(1.8-7.7)


 


Lymphocytes # (Auto)


 


 


 


 1.3 x10^3/uL


(1.0-4.8)


 


Monocytes # (Auto)


 


 


 


 0.4 x10^3/uL


(0.0-1.1)


 


Eosinophils # (Auto)


 


 


 


 0.1 x10^3/uL


(0.0-0.7)


 


Basophils # (Auto)


 


 


 


 0.0 x10^3/uL


(0.0-0.2)


 


Sodium Level


 


 


 


 137 mmol/L


(136-145)


 


Potassium Level


 


 


 


 3.8 mmol/L


(3.5-5.1)


 


Chloride Level


 


 


 


 106 mmol/L


()


 


Carbon Dioxide Level


 


 


 


 24 mmol/L


(21-32)


 


Anion Gap    7 (6-14) 


 


Blood Urea Nitrogen


 


 


 


 20 mg/dL


(7-20)


 


Creatinine


 


 


 


 0.6 mg/dL


(0.6-1.0)


 


Estimated GFR


(Cockcroft-Gault) 


 


 


 106.3 





 


Glucose Level


 


 


 


 137 mg/dL


(70-99)


 


Calcium Level


 


 


 


 8.8 mg/dL


(8.5-10.1)


 


Magnesium Level


 


 


 


 1.7 mg/dL


(1.8-2.4)


 


Test


 6/18/20


08:04 


 


 





 


O2 Saturation 98 % (92-99)    


 


Arterial Blood pH


 7.49


(7.35-7.45) 


 


 





 


Arterial Blood pCO2 at


Patient Temp 28 mmHg


(35-46) 


 


 





 


Arterial Blood pO2 at Patient


Temp 116 mmHg


() 


 


 





 


Arterial Blood HCO3


 21 mmol/L


(21-28) 


 


 





 


Arterial Blood Base Excess


 -2 mmol/L


(-3-3) 


 


 





 


FiO2 35%    











Assessment and Plan


Assessmemt and Plan


Problems


Medical Problems:


(1) Acute pancreatitis


Status: Acute  





(2) Cholelithiasis


Status: Acute  











Comment


Review of Relevant


I have reviewed the following items josy (where applicable) has been applied.


Labs





Laboratory Tests








Test


 6/16/20


12:19 6/17/20


00:23 6/17/20


06:00 6/17/20


06:13


 


Glucose (Fingerstick)


 145 mg/dL


(70-99) 95 mg/dL


(70-99) 


 108 mg/dL


(70-99)


 


White Blood Count


 


 


 8.0 x10^3/uL


(4.0-11.0) 





 


Red Blood Count


 


 


 3.14 x10^6/uL


(3.50-5.40) 





 


Hemoglobin


 


 


 8.9 g/dL


(12.0-15.5) 





 


Hematocrit


 


 


 26.7 %


(36.0-47.0) 





 


Mean Corpuscular Volume   85 fL ()  


 


Mean Corpuscular Hemoglobin   29 pg (25-35)  


 


Mean Corpuscular Hemoglobin


Concent 


 


 34 g/dL


(31-37) 





 


Red Cell Distribution Width


 


 


 18.3 %


(11.5-14.5) 





 


Platelet Count


 


 


 424 x10^3/uL


(140-400) 





 


Neutrophils (%) (Auto)   73 % (31-73)  


 


Lymphocytes (%) (Auto)   22 % (24-48)  


 


Monocytes (%) (Auto)   4 % (0-9)  


 


Eosinophils (%) (Auto)   1 % (0-3)  


 


Basophils (%) (Auto)   0 % (0-3)  


 


Neutrophils # (Auto)


 


 


 5.8 x10^3/uL


(1.8-7.7) 





 


Lymphocytes # (Auto)


 


 


 1.7 x10^3/uL


(1.0-4.8) 





 


Monocytes # (Auto)


 


 


 0.3 x10^3/uL


(0.0-1.1) 





 


Eosinophils # (Auto)


 


 


 0.1 x10^3/uL


(0.0-0.7) 





 


Basophils # (Auto)


 


 


 0.0 x10^3/uL


(0.0-0.2) 





 


Sodium Level


 


 


 140 mmol/L


(136-145) 





 


Potassium Level


 


 


 3.4 mmol/L


(3.5-5.1) 





 


Chloride Level


 


 


 106 mmol/L


() 





 


Carbon Dioxide Level


 


 


 25 mmol/L


(21-32) 





 


Anion Gap   9 (6-14)  


 


Blood Urea Nitrogen


 


 


 23 mg/dL


(7-20) 





 


Creatinine


 


 


 0.7 mg/dL


(0.6-1.0) 





 


Estimated GFR


(Cockcroft-Gault) 


 


 88.9 


 





 


BUN/Creatinine Ratio   33 (6-20)  


 


Glucose Level


 


 


 121 mg/dL


(70-99) 





 


Calcium Level


 


 


 9.0 mg/dL


(8.5-10.1) 





 


Magnesium Level


 


 


 1.7 mg/dL


(1.8-2.4) 





 


Total Bilirubin


 


 


 0.6 mg/dL


(0.2-1.0) 





 


Aspartate Amino Transf


(AST/SGOT) 


 


 18 U/L (15-37) 


 





 


Alanine Aminotransferase


(ALT/SGPT) 


 


 27 U/L (14-59) 


 





 


Alkaline Phosphatase


 


 


 110 U/L


() 





 


Total Protein


 


 


 4.5 g/dL


(6.4-8.2) 





 


Albumin


 


 


 1.0 g/dL


(3.4-5.0) 





 


Albumin/Globulin Ratio   0.3 (1.0-1.7)  


 


Test


 6/17/20


07:45 6/17/20


11:59 6/17/20


18:34 6/18/20


01:35


 


O2 Saturation 98 % (92-99)    


 


Arterial Blood pH


 7.47


(7.35-7.45) 


 


 





 


Arterial Blood pCO2 at


Patient Temp 27 mmHg


(35-46) 


 


 





 


Arterial Blood pO2 at Patient


Temp 109 mmHg


() 


 


 





 


Arterial Blood HCO3


 19 mmol/L


(21-28) 


 


 





 


Arterial Blood Base Excess


 -4 mmol/L


(-3-3) 


 


 





 


FiO2 35    


 


Glucose (Fingerstick)


 


 146 mg/dL


(70-99) 167 mg/dL


(70-99) 159 mg/dL


(70-99)


 


Test


 6/18/20


06:20 6/18/20


08:04 


 





 


White Blood Count


 10.0 x10^3/uL


(4.0-11.0) 


 


 





 


Red Blood Count


 3.06 x10^6/uL


(3.50-5.40) 


 


 





 


Hemoglobin


 8.7 g/dL


(12.0-15.5) 


 


 





 


Hematocrit


 26.0 %


(36.0-47.0) 


 


 





 


Mean Corpuscular Volume 85 fL ()    


 


Mean Corpuscular Hemoglobin 28 pg (25-35)    


 


Mean Corpuscular Hemoglobin


Concent 33 g/dL


(31-37) 


 


 





 


Red Cell Distribution Width


 18.2 %


(11.5-14.5) 


 


 





 


Platelet Count


 469 x10^3/uL


(140-400) 


 


 





 


Neutrophils (%) (Auto) 81 % (31-73)    


 


Lymphocytes (%) (Auto) 13 % (24-48)    


 


Monocytes (%) (Auto) 5 % (0-9)    


 


Eosinophils (%) (Auto) 1 % (0-3)    


 


Basophils (%) (Auto) 0 % (0-3)    


 


Neutrophils # (Auto)


 8.1 x10^3/uL


(1.8-7.7) 


 


 





 


Lymphocytes # (Auto)


 1.3 x10^3/uL


(1.0-4.8) 


 


 





 


Monocytes # (Auto)


 0.4 x10^3/uL


(0.0-1.1) 


 


 





 


Eosinophils # (Auto)


 0.1 x10^3/uL


(0.0-0.7) 


 


 





 


Basophils # (Auto)


 0.0 x10^3/uL


(0.0-0.2) 


 


 





 


Sodium Level


 137 mmol/L


(136-145) 


 


 





 


Potassium Level


 3.8 mmol/L


(3.5-5.1) 


 


 





 


Chloride Level


 106 mmol/L


() 


 


 





 


Carbon Dioxide Level


 24 mmol/L


(21-32) 


 


 





 


Anion Gap 7 (6-14)    


 


Blood Urea Nitrogen


 20 mg/dL


(7-20) 


 


 





 


Creatinine


 0.6 mg/dL


(0.6-1.0) 


 


 





 


Estimated GFR


(Cockcroft-Gault) 106.3 


 


 


 





 


Glucose Level


 137 mg/dL


(70-99) 


 


 





 


Calcium Level


 8.8 mg/dL


(8.5-10.1) 


 


 





 


Magnesium Level


 1.7 mg/dL


(1.8-2.4) 


 


 





 


O2 Saturation  98 % (92-99)   


 


Arterial Blood pH


 


 7.49


(7.35-7.45) 


 





 


Arterial Blood pCO2 at


Patient Temp 


 28 mmHg


(35-46) 


 





 


Arterial Blood pO2 at Patient


Temp 


 116 mmHg


() 


 





 


Arterial Blood HCO3


 


 21 mmol/L


(21-28) 


 





 


Arterial Blood Base Excess


 


 -2 mmol/L


(-3-3) 


 





 


FiO2  35%   








Laboratory Tests








Test


 6/17/20


11:59 6/17/20


18:34 6/18/20


01:35 6/18/20


06:20


 


Glucose (Fingerstick)


 146 mg/dL


(70-99) 167 mg/dL


(70-99) 159 mg/dL


(70-99) 





 


White Blood Count


 


 


 


 10.0 x10^3/uL


(4.0-11.0)


 


Red Blood Count


 


 


 


 3.06 x10^6/uL


(3.50-5.40)


 


Hemoglobin


 


 


 


 8.7 g/dL


(12.0-15.5)


 


Hematocrit


 


 


 


 26.0 %


(36.0-47.0)


 


Mean Corpuscular Volume    85 fL () 


 


Mean Corpuscular Hemoglobin    28 pg (25-35) 


 


Mean Corpuscular Hemoglobin


Concent 


 


 


 33 g/dL


(31-37)


 


Red Cell Distribution Width


 


 


 


 18.2 %


(11.5-14.5)


 


Platelet Count


 


 


 


 469 x10^3/uL


(140-400)


 


Neutrophils (%) (Auto)    81 % (31-73) 


 


Lymphocytes (%) (Auto)    13 % (24-48) 


 


Monocytes (%) (Auto)    5 % (0-9) 


 


Eosinophils (%) (Auto)    1 % (0-3) 


 


Basophils (%) (Auto)    0 % (0-3) 


 


Neutrophils # (Auto)


 


 


 


 8.1 x10^3/uL


(1.8-7.7)


 


Lymphocytes # (Auto)


 


 


 


 1.3 x10^3/uL


(1.0-4.8)


 


Monocytes # (Auto)


 


 


 


 0.4 x10^3/uL


(0.0-1.1)


 


Eosinophils # (Auto)


 


 


 


 0.1 x10^3/uL


(0.0-0.7)


 


Basophils # (Auto)


 


 


 


 0.0 x10^3/uL


(0.0-0.2)


 


Sodium Level


 


 


 


 137 mmol/L


(136-145)


 


Potassium Level


 


 


 


 3.8 mmol/L


(3.5-5.1)


 


Chloride Level


 


 


 


 106 mmol/L


()


 


Carbon Dioxide Level


 


 


 


 24 mmol/L


(21-32)


 


Anion Gap    7 (6-14) 


 


Blood Urea Nitrogen


 


 


 


 20 mg/dL


(7-20)


 


Creatinine


 


 


 


 0.6 mg/dL


(0.6-1.0)


 


Estimated GFR


(Cockcroft-Gault) 


 


 


 106.3 





 


Glucose Level


 


 


 


 137 mg/dL


(70-99)


 


Calcium Level


 


 


 


 8.8 mg/dL


(8.5-10.1)


 


Magnesium Level


 


 


 


 1.7 mg/dL


(1.8-2.4)


 


Test


 6/18/20


08:04 


 


 





 


O2 Saturation 98 % (92-99)    


 


Arterial Blood pH


 7.49


(7.35-7.45) 


 


 





 


Arterial Blood pCO2 at


Patient Temp 28 mmHg


(35-46) 


 


 





 


Arterial Blood pO2 at Patient


Temp 116 mmHg


() 


 


 





 


Arterial Blood HCO3


 21 mmol/L


(21-28) 


 


 





 


Arterial Blood Base Excess


 -2 mmol/L


(-3-3) 


 


 





 


FiO2 35%    








Microbiology


6/15/20 Gram Stain - Final, Resulted


          


6/15/20 Aerobic and Anaerobic Culture - Preliminary, Resulted


          


6/13/20 Gram Stain Evaluation - Final, Complete


          


6/13/20 Respiratory Culture - Final, Complete


          


6/13/20 Antimicrobic Susceptibility - Final, Complete


          


6/7/20 Blood Culture - Final, Complete


         NO GROWTH AFTER 5 DAYS


6/7/20 Urine Culture - Final, Complete


         


5/30/20 Gram Stain - Final, Complete


          


5/30/20 Aerobic Culture - Final, Complete


Medications





Current Medications


Sodium Chloride 1,000 ml @  1,000 mls/hr Q1H IV  Last administered on 3/16/20at 

03:00;  Start 3/16/20 at 03:00;  Stop 3/16/20 at 03:59;  Status DC


Ondansetron HCl (Zofran) 4 mg 1X  ONCE IVP  Last administered on 3/16/20at 

03:27;  Start 3/16/20 at 03:00;  Stop 3/16/20 at 03:01;  Status DC


Morphine Sulfate (Morphine Sulfate) 4 mg 1X  ONCE IV ;  Start 3/16/20 at 03:00; 

Stop 3/16/20 at 03:01;  Status Cancel


Ketorolac Tromethamine (Toradol 30mg Vial) 30 mg 1X  ONCE IV  Last administered 

on 3/16/20at 02:54;  Start 3/16/20 at 03:00;  Stop 3/16/20 at 03:01;  Status DC


Fentanyl Citrate (Fentanyl 2ml Vial) 25 mcg 1X  ONCE IVP  Last administered on 

3/16/20at 03:23;  Start 3/16/20 at 03:30;  Stop 3/16/20 at 03:31;  Status DC


Fentanyl Citrate (Fentanyl 2ml Vial) 100 mcg STK-MED ONCE .ROUTE ;  Start 

3/16/20 at 03:18;  Stop 3/16/20 at 03:18;  Status DC


Iohexol (Omnipaque 350 Mg/ml) 90 ml 1X  ONCE IV  Last administered on 3/16/20at 

03:25;  Start 3/16/20 at 03:30;  Stop 3/16/20 at 03:31;  Status DC


Info (CONTRAST GIVEN -- Rx MONITORING) 1 each PRN DAILY  PRN MC SEE COMMENTS;  

Start 3/16/20 at 03:30;  Stop 3/18/20 at 03:29;  Status DC


Hydromorphone HCl (Dilaudid) 0.5 mg 1X  ONCE IV  Last administered on 3/16/20at 

03:55;  Start 3/16/20 at 04:30;  Stop 3/16/20 at 04:32;  Status DC


Ondansetron HCl (Zofran) 4 mg PRN Q8HRS  PRN IV NAUSEA/VOMITING 1ST CHOICE;  

Start 3/16/20 at 05:00;  Stop 3/16/20 at 09:27;  Status DC


Morphine Sulfate (Morphine Sulfate) 2 mg PRN Q2HR  PRN IV SEVERE PAIN 7-10 Last 

administered on 3/17/20at 12:26;  Start 3/16/20 at 05:00;  Stop 3/17/20 at 

14:15;  Status DC


Sodium Chloride 1,000 ml @  125 mls/hr Q8H IV  Last administered on 3/16/20at 

20:56;  Start 3/16/20 at 05:00;  Stop 3/17/20 at 04:59;  Status DC


Hydromorphone HCl (Dilaudid) 0.5 mg PRN Q3HRS  PRN IV SEVERE PAIN 7-10 Last 

administered on 3/17/20at 10:06;  Start 3/16/20 at 05:00;  Stop 3/17/20 at 

12:01;  Status DC


Piperacillin Sod/ Tazobactam Sod 4.5 gm/Sodium Chloride 100 ml @  200 mls/hr 1X 

ONCE IV  Last administered on 3/16/20at 05:44;  Start 3/16/20 at 06:00;  Stop 

3/16/20 at 06:29;  Status DC


Ondansetron HCl (Zofran) 4 mg PRN Q4HRS  PRN IV NAUSEA/VOMITING 1ST CHOICE Last 

administered on 6/17/20at 23:20;  Start 3/16/20 at 09:30


Insulin Human Lispro (HumaLOG) 0-9 UNITS Q6HRS SQ  Last administered on 

6/17/20at 18:36;  Start 3/16/20 at 09:30


Dextrose (Dextrose 50%-Water Syringe) 12.5 gm PRN Q15MIN  PRN IV SEE COMMENTS;  

Start 3/16/20 at 09:30


Pantoprazole Sodium (PROTONIX VIAL for IV PUSH) 40 mg DAILYAC IVP  Last 

administered on 6/18/20at 08:15;  Start 3/16/20 at 11:30


Prochlorperazine Edisylate (Compazine) 10 mg PRN Q6HRS  PRN IV NAUSEA/VOMITING, 

2nd CHOICE Last administered on 6/16/20at 15:49;  Start 3/16/20 at 17:45


Atenolol (Tenormin) 100 mg DAILY PO ;  Start 3/17/20 at 09:00;  Stop 3/16/20 at 

20:08;  Status DC


Metoprolol Tartrate (Lopressor Vial) 2.5 mg Q6HRS IVP  Last administered on 

3/17/20at 05:51;  Start 3/16/20 at 20:15;  Stop 3/17/20 at 10:02;  Status DC


Metoprolol Tartrate (Lopressor Vial) 5 mg Q6HRS IVP  Last administered on 

3/26/20at 00:12;  Start 3/17/20 at 10:15;  Stop 3/28/20 at 08:48;  Status DC


Hydromorphone HCl (Dilaudid) 1 mg PRN Q3HRS  PRN IV SEVERE PAIN 7-10 Last 

administered on 3/23/20at 05:13;  Start 3/17/20 at 12:00;  Stop 3/31/20 at 

00:25;  Status DC


Lidocaine HCl (Buffered Lidocaine 1%) 3 ml STK-MED ONCE .ROUTE ;  Start 3/17/20 

at 12:55;  Stop 3/17/20 at 12:56;  Status DC


Albumin Human 500 ml @  125 mls/hr 1X  ONCE IV  Last administered on 3/17/20at 

14:33;  Start 3/17/20 at 14:30;  Stop 3/17/20 at 18:32;  Status DC


Norepinephrine Bitartrate 8 mg/ Dextrose 258 ml @  17.299 mls/ hr CONT  PRN IV 

PER PROTOCOL Last administered on 4/14/20at 12:48;  Start 3/17/20 at 15:30;  

Stop 4/17/20 at 09:19;  Status DC


Sodium Chloride 1,000 ml @  125 mls/hr Q8H IV  Last administered on 3/17/20at 

21:04;  Start 3/17/20 at 16:00;  Stop 3/18/20 at 02:42;  Status DC


Albumin Human 500 ml @  125 mls/hr PRN BID  PRN IV After every 2L NSS & BP < 

90mm Last administered on 6/6/20at 11:40;  Start 3/17/20 at 16:00


Iohexol (Omnipaque 300 Mg/ml) 60 ml 1X  ONCE IV  Last administered on 3/17/20at 

17:20;  Start 3/17/20 at 17:00;  Stop 3/17/20 at 17:01;  Status DC


Info (CONTRAST GIVEN -- Rx MONITORING) 1 each PRN DAILY  PRN MC SEE COMMENTS;  

Start 3/17/20 at 17:00;  Stop 3/19/20 at 16:59;  Status DC


Meropenem 1 gm/ Sodium Chloride 100 ml @  200 mls/hr Q8HRS IV  Last administered

on 3/18/20at 05:45;  Start 3/17/20 at 20:00;  Stop 3/18/20 at 08:48;  Status DC


Furosemide (Lasix) 40 mg 1X  ONCE IVP  Last administered on 3/17/20at 22:12;  

Start 3/17/20 at 22:30;  Stop 3/17/20 at 22:31;  Status DC


Calcium Chloride 1000 mg/Sodium Chloride 110 ml @  220 mls/hr 1X  ONCE IV  Last 

administered on 3/17/20at 22:11;  Start 3/17/20 at 22:30;  Stop 3/17/20 at 

22:59;  Status DC


Albuterol Sulfate (Ventolin Neb Soln) 2.5 mg 1X  ONCE NEB  Last administered on 

3/18/20at 00:56;  Start 3/17/20 at 22:30;  Stop 3/17/20 at 22:31;  Status DC


Insulin Human Regular (HumuLIN R VIAL) 5 unit 1X  ONCE IV  Last administered on 

3/17/20at 22:14;  Start 3/17/20 at 22:30;  Stop 3/17/20 at 22:31;  Status DC


Magnesium Sulfate 50 ml @ 25 mls/hr 1X  ONCE IV  Last administered on 3/18/20at 

02:57;  Start 3/18/20 at 03:00;  Stop 3/18/20 at 04:59;  Status DC


Calcium Gluconate 1000 mg/Sodium Chloride 110 ml @  220 mls/hr 1X  ONCE IV  Last

administered on 3/18/20at 02:46;  Start 3/18/20 at 03:00;  Stop 3/18/20 at 

03:29;  Status DC


Sodium Chloride 1,000 ml @  200 mls/hr Q5H IV  Last administered on 3/18/20at 

02:46;  Start 3/18/20 at 03:00;  Stop 3/18/20 at 10:21;  Status DC


Calcium Gluconate 1000 mg/Sodium Chloride 110 ml @  220 mls/hr 1X  ONCE IV  Last

administered on 3/18/20at 03:21;  Start 3/18/20 at 03:30;  Stop 3/18/20 at 

03:59;  Status DC


Sodium Bicarbonate 50 meq/Sodium Chloride 1,050 ml @  75 mls/hr Q14H IV  Last 

administered on 3/22/20at 21:10;  Start 3/18/20 at 07:30;  Stop 3/23/20 at 

10:28;  Status DC


Calcium Gluconate 2000 mg/Sodium Chloride 120 ml @  220 mls/hr 1X  ONCE IV  Last

administered on 3/18/20at 09:05;  Start 3/18/20 at 07:30;  Stop 3/18/20 at 0

8:02;  Status DC


Lidocaine HCl (Xylocaine-Mpf 1% 2ml Vial) 2 ml STK-MED ONCE .ROUTE ;  Start 

3/18/20 at 08:47;  Stop 3/18/20 at 08:47;  Status DC


Meropenem 500 mg/ Sodium Chloride 50 ml @  100 mls/hr Q12HR IV  Last 

administered on 3/23/20at 21:01;  Start 3/18/20 at 18:00;  Stop 3/24/20 at 

07:58;  Status DC


Lidocaine HCl (Buffered Lidocaine 1%) 3 ml STK-MED ONCE .ROUTE ;  Start 3/18/20 

at 09:46;  Stop 3/18/20 at 09:46;  Status DC


Lidocaine HCl (Buffered Lidocaine 1%) 6 ml 1X  ONCE INJ  Last administered on 3

/18/20at 10:26;  Start 3/18/20 at 10:15;  Stop 3/18/20 at 10:16;  Status DC


Info (Tpn Per Pharmacy) 1 each PRN DAILY  PRN MC SEE COMMENTS Last administered 

on 6/17/20at 10:33;  Start 3/18/20 at 12:00


Sodium Chloride 1,000 ml @  1,000 mls/hr Q1H PRN IV hypotension;  Start 3/18/20 

at 12:07;  Stop 3/18/20 at 18:06;  Status DC


Diphenhydramine HCl (Benadryl) 25 mg 1X PRN  PRN IV ITCHING;  Start 3/18/20 at 

12:15;  Stop 3/19/20 at 12:14;  Status DC


Diphenhydramine HCl (Benadryl) 25 mg 1X PRN  PRN IV ITCHING;  Start 3/18/20 at 

12:15;  Stop 3/19/20 at 12:14;  Status DC


Sodium Chloride 1,000 ml @  400 mls/hr Q2H30M PRN IV PATENCY;  Start 3/18/20 at 

12:07;  Stop 3/19/20 at 00:06;  Status DC


Info (PHARMACY MONITORING -- do not chart) 1 each PRN DAILY  PRN MC SEE COMMENT

S;  Start 3/18/20 at 12:15;  Stop 3/20/20 at 08:13;  Status DC


Sodium Chloride 90 meq/Calcium Gluconate 10 meq/ Multivitamins 10 ml/Chromium/ 

Copper/Manganese/ Seleni/Zn 1 ml/ Total Parenteral Nutrition/Amino 

Acids/Dextrose/ Fat Emulsion Intravenous 55.005 ml  @ 2.292 mls/hr TPN  CONT IV 

;  Start 3/18/20 at 22:00;  Stop 3/18/20 at 12:33;  Status DC


Info (Tpn Per Pharmacy) 1 each PRN DAILY  PRN MC SEE COMMENTS;  Start 3/18/20 at

12:30;  Status UNV


Sodium Chloride 90 meq/Calcium Gluconate 10 meq/ Multivitamins 10 ml/Chromium/ 

Copper/Manganese/ Seleni/Zn 0.5 ml/ Total Parenteral Nutrition/Amino 

Acids/Dextrose/ Fat Emulsion Intravenous 1,512 ml @  63 mls/hr TPN  CONT IV  

Last administered on 3/18/20at 22:06;  Start 3/18/20 at 22:00;  Stop 3/19/20 at 

21:59;  Status DC


Calcium Carbonate/ Glycine (Tums) 500 mg PRN AFTMEALHC  PRN PO INDIGESTION;  

Start 3/18/20 at 17:45;  Stop 5/13/20 at 10:25;  Status DC


Calcium Gluconate (Calcium Gluconate) 2,000 mg 1X  ONCE IVP  Last administered 

on 3/19/20at 02:19;  Start 3/19/20 at 02:15;  Stop 3/19/20 at 02:16;  Status DC


Calcium Chloride 3000 mg/Sodium Chloride 1,030 ml @  50 mls/hr [id number] IV  Last 

administered on 3/21/20at 02:17;  Start 3/19/20 at 08:00;  Stop 3/21/20 at 

15:23;  Status DC


Lorazepam (Ativan Inj) 1 mg PRN Q4HRS  PRN IVP ANXIETY / AGITATION, 2nd choic 

Last administered on 4/17/20at 03:51;  Start 3/19/20 at 09:00;  Stop 4/17/20 at 

09:19;  Status DC


Sodium Chloride 1,000 ml @  1,000 mls/hr Q1H PRN IV hypotension;  Start 3/19/20 

at 08:56;  Stop 3/19/20 at 14:55;  Status DC


Albumin Human 200 ml @  200 mls/hr 1X PRN  PRN IV Hypotension;  Start 3/19/20 at

09:00;  Stop 3/19/20 at 14:59;  Status DC


Diphenhydramine HCl (Benadryl) 25 mg 1X PRN  PRN IV ITCHING;  Start 3/19/20 at 

09:00;  Stop 3/20/20 at 08:59;  Status DC


Diphenhydramine HCl (Benadryl) 25 mg 1X PRN  PRN IV ITCHING;  Start 3/19/20 at 

09:00;  Stop 3/20/20 at 08:59;  Status DC


Sodium Chloride 1,000 ml @  400 mls/hr Q2H30M PRN IV PATENCY;  Start 3/19/20 at 

08:56;  Stop 3/19/20 at 20:55;  Status DC


Info (PHARMACY MONITORING -- do not chart) 1 each PRN DAILY  PRN MC SEE 

COMMENTS;  Start 3/19/20 at 09:00;  Status UNV


Info (PHARMACY MONITORING -- do not chart) 1 each PRN DAILY  PRN MC SEE 

COMMENTS;  Start 3/19/20 at 09:00;  Stop 3/20/20 at 08:13;  Status DC


Digoxin (Lanoxin) 500 mcg 1X  ONCE IV  Last administered on 3/19/20at 10:04;  

Start 3/19/20 at 10:00;  Stop 3/19/20 at 10:01;  Status DC


Digoxin (Lanoxin) 125 mcg 1X  ONCE IV  Last administered on 3/19/20at 17:10;  

Start 3/19/20 at 18:00;  Stop 3/19/20 at 18:01;  Status DC


Magnesium Sulfate 100 ml @  25 mls/hr 1X  ONCE IV  Last administered on 

3/19/20at 12:48;  Start 3/19/20 at 13:00;  Stop 3/19/20 at 16:59;  Status DC


Sodium Chloride 90 meq/Magnesium Sulfate 10 meq/ Calcium Gluconate 20 meq/ 

Multivitamins 10 ml/Chromium/ Copper/Manganese/ Seleni/Zn 0.5 ml/ Total 

Parenteral Nutrition/Amino Acids/Dextrose/ Fat Emulsion Intravenous 1,512 ml @  

63 mls/hr TPN  CONT IV  Last administered on 3/19/20at 22:25;  Start 3/19/20 at 

22:00;  Stop 3/20/20 at 21:59;  Status DC


Sodium Chloride 1,000 ml @  1,000 mls/hr Q1H PRN IV hypotension;  Start 3/20/20 

at 08:05;  Stop 3/20/20 at 14:04;  Status DC


Albumin Human 200 ml @  200 mls/hr 1X  ONCE IV  Last administered on 3/20/20at 

08:57;  Start 3/20/20 at 08:15;  Stop 3/20/20 at 09:14;  Status DC


Diphenhydramine HCl (Benadryl) 25 mg 1X PRN  PRN IV ITCHING;  Start 3/20/20 at 

08:15;  Stop 3/21/20 at 08:14;  Status DC


Diphenhydramine HCl (Benadryl) 25 mg 1X PRN  PRN IV ITCHING;  Start 3/20/20 at 

08:15;  Stop 3/21/20 at 08:14;  Status DC


Sodium Chloride 1,000 ml @  400 mls/hr Q2H30M PRN IV PATENCY;  Start 3/20/20 at 

08:05;  Stop 3/20/20 at 20:04;  Status DC


Info (PHARMACY MONITORING -- do not chart) 1 each PRN DAILY  PRN MC SEE 

COMMENTS;  Start 3/20/20 at 08:15;  Stop 3/24/20 at 07:57;  Status DC


Sodium Chloride 90 meq/Potassium Chloride 15 meq/ Potassium Phosphate 10 mmol/ 

Magnesium Sulfate 10 meq/Calcium Gluconate 20 meq/ Multivitamins 10 ml/Chromium/

Copper/Manganese/ Seleni/Zn 0.5 ml/ Total Parenteral Nutrition/Amino 

Acids/Dextrose/ Fat Emulsion Intravenous 1,512 ml @  63 mls/hr TPN  CONT IV  

Last administered on 3/20/20at 21:01;  Start 3/20/20 at 22:00;  Stop 3/21/20 at 

21:59;  Status DC


Potassium Chloride/Water 100 ml @  100 mls/hr 1X  ONCE IV  Last administered on 

3/20/20at 14:09;  Start 3/20/20 at 14:00;  Stop 3/20/20 at 14:59;  Status DC


Benzocaine (Hurricaine One) 1 spray 1X  ONCE MM  Last administered on 3/20/20at 

16:38;  Start 3/20/20 at 14:30;  Stop 3/20/20 at 14:31;  Status DC


Lidocaine HCl (Glydo (Lidocaine) Jelly) 1 thomas 1X  ONCE MM  Last administered on 

3/20/20at 16:38;  Start 3/20/20 at 14:30;  Stop 3/20/20 at 14:31;  Status DC


Linezolid/Dextrose 300 ml @  300 mls/hr Q12HR IV  Last administered on 3/26/20at

21:04;  Start 3/20/20 at 20:00;  Stop 3/27/20 at 07:50;  Status DC


Acetaminophen (Tylenol) 650 mg PRN Q6HRS  PRN PO MILD PAIN / TEMP;  Start 

3/21/20 at 03:30;  Stop 3/21/20 at 03:36;  Status DC


Acetaminophen (Tylenol) 650 mg PRN Q6HRS  PRN PEG MILD PAIN / TEMP Last 

administered on 4/16/20at 19:56;  Start 3/21/20 at 03:36;  Stop 5/13/20 at 

10:25;  Status DC


Sodium Chloride 1,000 ml @  1,000 mls/hr Q1H PRN IV hypotension;  Start 3/21/20 

at 07:50;  Stop 3/21/20 at 13:49;  Status DC


Albumin Human 200 ml @  200 mls/hr 1X PRN  PRN IV Hypotension;  Start 3/21/20 at

08:00;  Stop 3/21/20 at 13:59;  Status DC


Sodium Chloride (Normal Saline Flush) 10 ml 1X PRN  PRN IV AP catheter pack;  

Start 3/21/20 at 08:00;  Stop 3/22/20 at 07:59;  Status DC


Sodium Chloride (Normal Saline Flush) 10 ml 1X PRN  PRN IV  catheter pack;  

Start 3/21/20 at 08:00;  Stop 3/22/20 at 07:59;  Status DC


Sodium Chloride 1,000 ml @  400 mls/hr Q2H30M PRN IV PATENCY;  Start 3/21/20 at 

07:50;  Stop 3/21/20 at 19:49;  Status DC


Info (PHARMACY MONITORING -- do not chart) 1 each PRN DAILY  PRN MC SEE 

COMMENTS;  Start 3/21/20 at 08:00;  Status UNV


Info (PHARMACY MONITORING -- do not chart) 1 each PRN DAILY  PRN MC SEE 

COMMENTS;  Start 3/21/20 at 08:00;  Stop 3/23/20 at 08:25;  Status DC


Sodium Chloride 90 meq/Potassium Chloride 15 meq/ Potassium Phosphate 10 mmol/ 

Magnesium Sulfate 10 meq/Calcium Gluconate 20 meq/ Multivitamins 10 ml/Chromium/

Copper/Manganese/ Seleni/Zn 0.5 ml/ Total Parenteral Nutrition/Amino 

Acids/Dextrose/ Fat Emulsion Intravenous 1,512 ml @  63 mls/hr TPN  CONT IV  

Last administered on 3/21/20at 20:57;  Start 3/21/20 at 22:00;  Stop 3/22/20 at 

21:59;  Status DC


Sodium Chloride 90 meq/Potassium Chloride 15 meq/ Potassium Phosphate 15 mmol/ 

Magnesium Sulfate 10 meq/Calcium Gluconate 20 meq/ Multivitamins 10 ml/Chromium/

Copper/Manganese/ Seleni/Zn 0.5 ml/ Total Parenteral Nutrition/Amino 

Acids/Dextrose/ Fat Emulsion Intravenous 1,512 ml @  63 mls/hr TPN  CONT IV ;  

Start 3/22/20 at 22:00;  Stop 3/22/20 at 14:16;  Status DC


Sodium Chloride 90 meq/Potassium Chloride 15 meq/ Potassium Phosphate 15 mmol/ 

Magnesium Sulfate 10 meq/Calcium Gluconate 20 meq/ Multivitamins 10 ml/Chromium/

Copper/Manganese/ Seleni/Zn 0.5 ml/ Total Parenteral Nutrition/Amino 

Acids/Dextrose/ Fat Emulsion Intravenous 1,200 ml @  50 mls/hr TPN  CONT IV ;  

Start 3/22/20 at 22:00;  Stop 3/22/20 at 14:17;  Status DC


Sodium Chloride 90 meq/Potassium Chloride 15 meq/ Potassium Phosphate 10 mmol/ 

Magnesium Sulfate 10 meq/Calcium Gluconate 20 meq/ Multivitamins 10 ml/Chromium/

Copper/Manganese/ Seleni/Zn 0.5 ml/ Total Parenteral Nutrition/Amino Acids/D

extrose/ Fat Emulsion Intravenous 1,200 ml @  50 mls/hr TPN  CONT IV  Last 

administered on 3/22/20at 23:29;  Start 3/22/20 at 22:00;  Stop 3/23/20 at 

21:59;  Status DC


Sodium Chloride 1,000 ml @  1,000 mls/hr Q1H PRN IV hypotension;  Start 3/23/20 

at 07:28;  Stop 3/23/20 at 13:27;  Status DC


Albumin Human 200 ml @  200 mls/hr 1X  ONCE IV  Last administered on 3/23/20at 

08:51;  Start 3/23/20 at 07:30;  Stop 3/23/20 at 08:29;  Status DC


Diphenhydramine HCl (Benadryl) 25 mg 1X PRN  PRN IV ITCHING;  Start 3/23/20 at 

07:30;  Stop 3/24/20 at 07:29;  Status DC


Diphenhydramine HCl (Benadryl) 25 mg 1X PRN  PRN IV ITCHING;  Start 3/23/20 at 0

7:30;  Stop 3/24/20 at 07:29;  Status DC


Sodium Chloride 1,000 ml @  400 mls/hr Q2H30M PRN IV PATENCY;  Start 3/23/20 at 

07:28;  Stop 3/23/20 at 19:27;  Status DC


Info (PHARMACY MONITORING -- do not chart) 1 each PRN DAILY  PRN MC SEE 

COMMENTS;  Start 3/23/20 at 07:30;  Stop 4/3/20 at 13:01;  Status DC


Metronidazole 100 ml @  100 mls/hr Q6HRS IV  Last administered on 4/8/20at 06:26

;  Start 3/23/20 at 08:30;  Stop 4/8/20 at 09:58;  Status DC


Micafungin Sodium 100 mg/Dextrose 100 ml @  100 mls/hr Q24H IV  Last 

administered on 4/30/20at 08:18;  Start 3/23/20 at 09:00;  Stop 4/30/20 at 

20:58;  Status DC


Propofol 0 ml @ As Directed STK-MED ONCE IV ;  Start 3/23/20 at 07:53;  Stop 

3/23/20 at 07:53;  Status DC


Etomidate (Amidate) 20 mg STK-MED ONCE IV ;  Start 3/23/20 at 07:53;  Stop 

3/23/20 at 07:54;  Status DC


Midazolam HCl (Versed) 5 mg STK-MED ONCE .ROUTE ;  Start 3/23/20 at 07:57;  Stop

3/23/20 at 07:57;  Status DC


Fentanyl Citrate 30 ml @ 0 mls/hr CONT  PRN IV SEE PROTOCOL Last administered on

4/17/20at 06:12;  Start 3/23/20 at 08:15;  Stop 4/17/20 at 09:19;  Status DC


Artificial Tears (Artificial Tears) 1 drop PRN Q1HR  PRN OU DRY EYE, 1st choice;

 Start 3/23/20 at 08:15;  Stop 4/29/20 at 05:31;  Status DC


Midazolam HCl 50 mg/Sodium Chloride 50 ml @ 0 mls/hr CONT  PRN IV SEE PROTOCOL 

Last administered on 3/26/20at 22:39;  Start 3/23/20 at 08:15;  Stop 3/28/20 at 

15:59;  Status DC


Etomidate (Amidate) 8 mg 1X  ONCE IV  Last administered on 3/23/20at 08:33;  

Start 3/23/20 at 08:30;  Stop 3/23/20 at 08:31;  Status DC


Succinylcholine Chloride (Anectine) 120 mg 1X  ONCE IV  Last administered on 

3/23/20at 08:34;  Start 3/23/20 at 08:30;  Stop 3/23/20 at 08:31;  Status DC


Midazolam HCl (Versed) 5 mg 1X  ONCE IV ;  Start 3/23/20 at 08:30;  Stop 3/23/20

at 08:31;  Status DC


Potassium Chloride 15 meq/ Bicarbonate Dialysis Soln w/ out KCl 5,007.5 ml  @ 

1,000 mls/ hr Q5H1M IV  Last administered on 3/24/20at 11:11;  Start 3/23/20 at 

12:00;  Stop 3/24/20 at 11:15;  Status DC


Potassium Chloride 15 meq/ Bicarbonate Dialysis Soln w/ out KCl 5,007.5 ml  @ 

1,000 mls/ hr Q5H1M IV  Last administered on 3/24/20at 11:12;  Start 3/23/20 at 

12:00;  Stop 3/24/20 at 11:17;  Status DC


Potassium Chloride 15 meq/ Bicarbonate Dialysis Soln w/ out KCl 5,007.5 ml  @ 

1,000 mls/ hr Q5H1M IV  Last administered on 3/24/20at 11:11;  Start 3/23/20 at 

12:00;  Stop 3/24/20 at 11:19;  Status DC


Sodium Chloride 90 meq/Potassium Chloride 15 meq/ Potassium Phosphate 10 mmol/ 

Magnesium Sulfate 10 meq/Calcium Gluconate 20 meq/ Multivitamins 10 ml/Chromium/

Copper/Manganese/ Seleni/Zn 0.5 ml/ Total Parenteral Nutrition/Amino 

Acids/Dextrose/ Fat Emulsion Intravenous 1,400 ml @  58.333 mls/ hr TPN  CONT IV

 Last administered on 3/23/20at 21:42;  Start 3/23/20 at 22:00;  Stop 3/24/20 at

21:59;  Status DC


Heparin Sodium (Porcine) (Heparin Sodium) 5,000 unit Q8HRS SQ  Last administered

on 3/28/20at 05:55;  Start 3/23/20 at 15:00;  Stop 3/28/20 at 13:28;  Status DC


Meropenem 500 mg/ Sodium Chloride 50 ml @  100 mls/hr Q6HRS IV  Last 

administered on 3/25/20at 06:00;  Start 3/24/20 at 09:00;  Stop 3/25/20 at 

07:29;  Status DC


Potassium Phosphate 20 mmol/ Sodium Chloride 106.6667 ml @  51.667 m... 1X  ONCE

IV  Last administered on 3/24/20at 11:22;  Start 3/24/20 at 10:15;  Stop 3/24/20

at 12:18;  Status DC


Acetaminophen (Tylenol Supp) 650 mg PRN Q6HRS  PRN FL MILD PAIN / TEMP > 100.3'F

Last administered on 6/10/20at 22:16;  Start 3/24/20 at 10:30


Potassium Chloride/Water 100 ml @  100 mls/hr Q1H IV  Last administered on 

3/24/20at 12:12;  Start 3/24/20 at 11:00;  Stop 3/24/20 at 12:59;  Status DC


Potassium Chloride 20 meq/ Bicarbonate Dialysis Soln w/ out KCl 5,010 ml @  

1,000 mls/hr Q5H1M IV  Last administered on 3/25/20at 08:48;  Start 3/24/20 at 

12:00;  Stop 3/25/20 at 13:03;  Status DC


Potassium Chloride 20 meq/ Bicarbonate Dialysis Soln w/ out KCl 5,010 ml @  

1,000 mls/hr Q5H1M IV  Last administered on 3/29/20at 14:52;  Start 3/24/20 at 

11:30;  Stop 3/29/20 at 19:59;  Status DC


Potassium Chloride 20 meq/ Bicarbonate Dialysis Soln w/ out KCl 5,010 ml @  

1,000 mls/hr Q5H1M IV  Last administered on 3/29/20at 14:53;  Start 3/24/20 at 

11:30;  Stop 3/29/20 at 19:59;  Status DC


Sodium Chloride 90 meq/Potassium Chloride 15 meq/ Potassium Phosphate 15 mmol/ 

Magnesium Sulfate 10 meq/Calcium Gluconate 15 meq/ Multivitamins 10 ml/Chromium/

Copper/Manganese/ Seleni/Zn 0.5 ml/ Total Parenteral Nutrition/Amino 

Acids/Dextrose/ Fat Emulsion Intravenous 1,400 ml @  58.333 mls/ hr TPN  CONT IV

 Last administered on 3/24/20at 22:17;  Start 3/24/20 at 22:00;  Stop 3/25/20 at

21:59;  Status DC


Cefepime HCl (Maxipime) 2 gm Q12HR IVP  Last administered on 4/7/20at 20:56;  

Start 3/25/20 at 09:00;  Stop 4/8/20 at 09:58;  Status DC


Daptomycin 500 mg/ Sodium Chloride 50 ml @  100 mls/hr Q48H IV  Last 

administered on 4/10/20at 09:57;  Start 3/25/20 at 08:30;  Stop 4/10/20 at 

10:07;  Status DC


Lidocaine HCl (Buffered Lidocaine 1%) 3 ml 1X  ONCE INJ  Last administered on 

3/25/20at 10:27;  Start 3/25/20 at 10:30;  Stop 3/25/20 at 10:31;  Status DC


Potassium Phosphate 20 mmol/ Sodium Chloride 106.6667 ml @  51.667 m... 1X  ONCE

IV  Last administered on 3/25/20at 12:51;  Start 3/25/20 at 13:00;  Stop 3/25/20

at 15:03;  Status DC


Sodium Chloride 90 meq/Potassium Chloride 15 meq/ Potassium Phosphate 18 mmol/ 

Magnesium Sulfate 8 meq/Calcium Gluconate 15 meq/ Multivitamins 10 ml/Chromium/ 

Copper/Manganese/ Seleni/Zn 0.5 ml/ Total Parenteral Nutrition/Amino 

Acids/Dextrose/ Fat Emulsion Intravenous 1,400 ml @  58.333 mls/ hr TPN  CONT IV

 Last administered on 3/25/20at 22:16;  Start 3/25/20 at 22:00;  Stop 3/26/20 at

21:59;  Status DC


Potassium Chloride 20 meq/ Bicarbonate Dialysis Soln w/ out KCl 5,010 ml @  

1,000 mls/hr Q5H1M IV  Last administered on 3/29/20at 14:54;  Start 3/25/20 at 

16:00;  Stop 3/29/20 at 19:59;  Status DC


Multi-Ingred Cream/Lotion/Oil/ Oint (Artificial Tears Eye Ointment) 1 thomas PRN 

Q1HR  PRN OU DRY EYE, 2nd choice Last administered on 4/13/20at 08:19;  Start 

3/25/20 at 17:30;  Stop 6/3/20 at 14:39;  Status DC


Sodium Chloride 90 meq/Potassium Chloride 15 meq/ Potassium Phosphate 18 mmol/ 

Magnesium Sulfate 8 meq/Calcium Gluconate 15 meq/ Multivitamins 10 ml/Chromium/ 

Copper/Manganese/ Seleni/Zn 0.5 ml/ Total Parenteral Nutrition/Amino 

Acids/Dextrose/ Fat Emulsion Intravenous 1,400 ml @  58.333 mls/ hr TPN  CONT IV

 Last administered on 3/26/20at 22:00;  Start 3/26/20 at 22:00;  Stop 3/27/20 at

21:59;  Status DC


Albumin Human 500 ml @  125 mls/hr 1X  ONCE IV ;  Start 3/26/20 at 14:15;  Stop 

3/26/20 at 18:14;  Status DC


Sodium Chloride 90 meq/Potassium Chloride 15 meq/ Potassium Phosphate 18 mmol/ 

Magnesium Sulfate 8 meq/Calcium Gluconate 15 meq/ Multivitamins 10 ml/Chromium/ 

Copper/Manganese/ Seleni/Zn 0.5 ml/ Insulin Human Regular 10 unit/ Total 

Parenteral Nutrition/Amino Acids/Dextrose/ Fat Emulsion Intravenous 1,400 ml @  

58.333 mls/ hr TPN  CONT IV  Last administered on 3/27/20at 21:43;  Start 

3/27/20 at 22:00;  Stop 3/28/20 at 21:59;  Status DC


Lidocaine HCl (Buffered Lidocaine 1%) 3 ml STK-MED ONCE .ROUTE ;  Start 3/25/20 

at 10:00;  Stop 3/27/20 at 13:57;  Status DC


Midazolam HCl 100 mg/Sodium Chloride 100 ml @ 7 mls/hr CONT  PRN IV SEE PROTOCOL

Last administered on 4/8/20at 15:35;  Start 3/28/20 at 16:00;  Stop 6/3/20 at 14

:38;  Status DC


Sodium Chloride 90 meq/Potassium Chloride 15 meq/ Potassium Phosphate 18 mmol/ 

Magnesium Sulfate 8 meq/Calcium Gluconate 15 meq/ Multivitamins 10 ml/Chromium/ 

Copper/Manganese/ Seleni/Zn 0.5 ml/ Insulin Human Regular 15 unit/ Total 

Parenteral Nutrition/Amino Acids/Dextrose/ Fat Emulsion Intravenous 1,400 ml @  

58.333 mls/ hr TPN  CONT IV  Last administered on 3/28/20at 20:34;  Start 

3/28/20 at 22:00;  Stop 3/29/20 at 21:59;  Status DC


Info ([facility] Electrolyte Protocol) 1 ea CONT PRN  PRN MC PER PROTOCOL;  Start 

3/29/20 at 13:15


Sodium Chloride 90 meq/Potassium Chloride 15 meq/ Potassium Phosphate 18 mmol/ 

Magnesium Sulfate 8 meq/Calcium Gluconate 15 meq/ Multivitamins 10 ml/Chromium/ 

Copper/Manganese/ Seleni/Zn 0.5 ml/ Insulin Human Regular 15 unit/ Total 

Parenteral Nutrition/Amino Acids/Dextrose/ Fat Emulsion Intravenous 1,400 ml @  

58.333 mls/ hr TPN  CONT IV  Last administered on 3/29/20at 22:05;  Start 

3/29/20 at 22:00;  Stop 3/30/20 at 21:59;  Status DC


Potassium Chloride 15 meq/ Bicarbonate Dialysis Soln w/ out KCl 5,007.5 ml  @ 

1,000 mls/ hr Q5H1M IV  Last administered on 4/1/20at 18:14;  Start 3/29/20 at 

20:00;  Stop 4/2/20 at 13:08;  Status DC


Potassium Chloride 15 meq/ Bicarbonate Dialysis Soln w/ out KCl 5,007.5 ml  @ 

1,000 mls/ hr Q5H1M IV  Last administered on 4/1/20at 18:14;  Start 3/29/20 at 

20:00;  Stop 4/2/20 at 13:08;  Status DC


Potassium Chloride 15 meq/ Bicarbonate Dialysis Soln w/ out KCl 5,007.5 ml  @ 

1,000 mls/ hr Q5H1M IV  Last administered on 4/1/20at 18:14;  Start 3/29/20 at 

20:00;  Stop 4/2/20 at 13:08;  Status DC


Iohexol (Omnipaque 240 Mg/ml) 30 ml 1X  ONCE PO  Last administered on 3/30/20at 

11:30;  Start 3/30/20 at 11:30;  Stop 3/30/20 at 11:33;  Status DC


Info (CONTRAST GIVEN -- Rx MONITORING) 1 each PRN DAILY  PRN MC SEE COMMENTS;  

Start 3/30/20 at 11:45;  Stop 4/1/20 at 11:44;  Status DC


Sodium Chloride 90 meq/Potassium Chloride 15 meq/ Potassium Phosphate 18 mmol/ 

Magnesium Sulfate 8 meq/Calcium Gluconate 15 meq/ Multivitamins 10 ml/Chromium/ 

Copper/Manganese/ Seleni/Zn 0.5 ml/ Insulin Human Regular 15 unit/ Total 

Parenteral Nutrition/Amino Acids/Dextrose/ Fat Emulsion Intravenous 1,400 ml @  

58.333 mls/ hr TPN  CONT IV  Last administered on 3/30/20at 21:47;  Start 

3/30/20 at 22:00;  Stop 3/31/20 at 21:59;  Status DC


Sodium Chloride 90 meq/Potassium Chloride 15 meq/ Potassium Phosphate 18 mmol/ 

Magnesium Sulfate 8 meq/Calcium Gluconate 15 meq/ Multivitamins 10 ml/Chromium/ 

Copper/Manganese/ Seleni/Zn 0.5 ml/ Insulin Human Regular 20 unit/ Total 

Parenteral Nutrition/Amino Acids/Dextrose/ Fat Emulsion Intravenous 1,400 ml @  

58.333 mls/ hr TPN  CONT IV  Last administered on 3/31/20at 21:36;  Start 

3/31/20 at 22:00;  Stop 4/1/20 at 21:59;  Status DC


Alteplase, Recombinant (Cathflo For Central Catheter Clearance) 1 mg 1X  ONCE 

INT CAT  Last administered on 3/31/20at 20:03;  Start 3/31/20 at 19:30;  Stop 

3/31/20 at 19:46;  Status DC


Alteplase, Recombinant (Cathflo For Central Catheter Clearance) 1 mg 1X  ONCE 

INT CAT  Last administered on 3/31/20at 22:05;  Start 3/31/20 at 22:00;  Stop 

3/31/20 at 22:01;  Status DC


Sodium Chloride 90 meq/Potassium Chloride 15 meq/ Potassium Phosphate 18 mmol/ 

Magnesium Sulfate 8 meq/Calcium Gluconate 15 meq/ Multivitamins 10 ml/Chromium/ 

Copper/Manganese/ Seleni/Zn 0.5 ml/ Insulin Human Regular 20 unit/ Total 

Parenteral Nutrition/Amino Acids/Dextrose/ Fat Emulsion Intravenous 1,400 ml @  

58.333 mls/ hr TPN  CONT IV  Last administered on 4/1/20at 21:30;  Start 4/1/20 

at 22:00;  Stop 4/2/20 at 21:59;  Status DC


Dexmedetomidine HCl 400 mcg/ Sodium Chloride 100 ml @ 0 mls/hr CONT  PRN IV 

ANXIETY / AGITATION Last administered on 5/30/20at 12:57;  Start 4/2/20 at 

08:15;  Stop 5/30/20 at 18:31;  Status DC


Sodium Chloride 500 ml @  500 mls/hr 1X PRN  PRN IV ELEVATED BP, SEE COMMENTS;  

Start 4/2/20 at 08:15


Atropine Sulfate (ATROPINE 0.5mg SYRINGE) 0.5 mg PRN Q5MIN  PRN IV SEE COMMENTS;

 Start 4/2/20 at 08:15


Furosemide (Lasix) 20 mg 1X  ONCE IVP  Last administered on 4/2/20at 08:19;  

Start 4/2/20 at 08:15;  Stop 4/2/20 at 08:16;  Status DC


Lidocaine HCl (Buffered Lidocaine 1%) 3 ml STK-MED ONCE .ROUTE ;  Start 4/2/20 

at 08:39;  Stop 4/2/20 at 08:39;  Status DC


Lidocaine HCl (Buffered Lidocaine 1%) 6 ml 1X  ONCE INJ  Last administered on 

4/2/20at 09:05;  Start 4/2/20 at 09:00;  Stop 4/2/20 at 09:06;  Status DC


Sodium Chloride 90 meq/Potassium Chloride 15 meq/ Potassium Phosphate 18 mmol/ 

Magnesium Sulfate 8 meq/Calcium Gluconate 15 meq/ Multivitamins 10 ml/Chromium/ 

Copper/Manganese/ Seleni/Zn 0.5 ml/ Insulin Human Regular 20 unit/ Total 

Parenteral Nutrition/Amino Acids/Dextrose/ Fat Emulsion Intravenous 1,400 ml @  

58.333 mls/ hr TPN  CONT IV  Last administered on 4/2/20at 22:45;  Start 4/2/20 

at 22:00;  Stop 4/3/20 at 21:59;  Status DC


Sodium Chloride 1,000 ml @  1,000 mls/hr Q1H PRN IV hypotension;  Start 4/3/20 

at 07:30;  Stop 4/3/20 at 13:29;  Status DC


Albumin Human 200 ml @  200 mls/hr 1X PRN  PRN IV Hypotension Last administered 

on 4/3/20at 09:36;  Start 4/3/20 at 07:30;  Stop 4/3/20 at 13:29;  Status DC


Sodium Chloride (Normal Saline Flush) 10 ml 1X PRN  PRN IV AP catheter pack;  

Start 4/3/20 at 07:30;  Stop 4/3/20 at 21:29;  Status DC


Sodium Chloride (Normal Saline Flush) 10 ml 1X PRN  PRN IV  catheter pack;  

Start 4/3/20 at 07:30;  Stop 4/4/20 at 07:29;  Status DC


Sodium Chloride 1,000 ml @  400 mls/hr Q2H30M PRN IV PATENCY;  Start 4/3/20 at 

07:30;  Stop 4/3/20 at 19:29;  Status DC


Info (PHARMACY MONITORING -- do not chart) 1 each PRN DAILY  PRN MC SEE 

COMMENTS;  Start 4/3/20 at 07:30;  Stop 4/3/20 at 13:02;  Status DC


Info (PHARMACY MONITORING -- do not chart) 1 each PRN DAILY  PRN MC SEE 

COMMENTS;  Start 4/3/20 at 07:30;  Stop 4/5/20 at 12:45;  Status DC


Sodium Chloride 90 meq/Potassium Chloride 15 meq/ Potassium Phosphate 10 mmol/ 

Magnesium Sulfate 8 meq/Calcium Gluconate 15 meq/ Multivitamins 10 ml/Chromium/ 

Copper/Manganese/ Seleni/Zn 0.5 ml/ Insulin Human Regular 25 unit/ Total 

Parenteral Nutrition/Amino Acids/Dextrose/ Fat Emulsion Intravenous 1,400 ml @  

58.333 mls/ hr TPN  CONT IV  Last administered on 4/3/20at 22:19;  Start 4/3/20 

at 22:00;  Stop 4/4/20 at 21:59;  Status DC


Heparin Sodium (Porcine) (Heparin Sodium) 5,000 unit Q12HR SQ  Last administered

on 4/26/20at 08:59;  Start 4/3/20 at 21:00;  Stop 4/26/20 at 10:05;  Status DC


Ondansetron HCl (Zofran) 4 mg PRN Q6HRS  PRN IV NAUSEA/VOMITING;  Start 4/6/20 

at 07:00;  Stop 4/7/20 at 06:59;  Status DC


Fentanyl Citrate (Fentanyl 2ml Vial) 25 mcg PRN Q5MIN  PRN IV MILD PAIN 1-3;  

Start 4/6/20 at 07:00;  Stop 4/7/20 at 06:59;  Status DC


Fentanyl Citrate (Fentanyl 2ml Vial) 50 mcg PRN Q5MIN  PRN IV MODERATE TO SEVERE

PAIN;  Start 4/6/20 at 07:00;  Stop 4/7/20 at 06:59;  Status DC


Ringer's Solution 1,000 ml @  30 mls/hr Q24H IV ;  Start 4/6/20 at 07:00;  Stop 

4/6/20 at 18:59;  Status DC


Lidocaine HCl (Xylocaine-Mpf 1% 2ml Vial) 2 ml PRN 1X  PRN ID PRIOR TO IV START;

 Start 4/6/20 at 07:00;  Stop 4/7/20 at 06:59;  Status DC


Prochlorperazine Edisylate (Compazine) 5 mg PACU PRN  PRN IV NAUSEA, MRX1;  

Start 4/6/20 at 07:00;  Stop 4/7/20 at 06:59;  Status DC


Sodium Chloride 1,000 ml @  1,000 mls/hr Q1H PRN IV hypotension;  Start 4/4/20 

at 09:10;  Stop 4/4/20 at 15:09;  Status DC


Albumin Human 200 ml @  200 mls/hr 1X PRN  PRN IV Hypotension Last administered 

on 4/4/20at 10:10;  Start 4/4/20 at 09:15;  Stop 4/4/20 at 15:14;  Status DC


Sodium Chloride 1,000 ml @  400 mls/hr Q2H30M PRN IV PATENCY;  Start 4/4/20 at 

09:10;  Stop 4/4/20 at 21:09;  Status DC


Info (PHARMACY MONITORING -- do not chart) 1 each PRN DAILY  PRN MC SEE 

COMMENTS;  Start 4/4/20 at 09:15;  Stop 4/5/20 at 12:45;  Status DC


Info (PHARMACY MONITORING -- do not chart) 1 each PRN DAILY  PRN MC SEE 

COMMENTS;  Start 4/4/20 at 09:15;  Stop 4/5/20 at 12:45;  Status DC


Sodium Chloride 90 meq/Potassium Chloride 15 meq/ Potassium Phosphate 10 mmol/ 

Magnesium Sulfate 8 meq/Calcium Gluconate 15 meq/ Multivitamins 10 ml/Chromium/ 

Copper/Manganese/ Seleni/Zn 0.5 ml/ Insulin Human Regular 25 unit/ Total 

Parenteral Nutrition/Amino Acids/Dextrose/ Fat Emulsion Intravenous 1,400 ml @  

58.333 mls/ hr TPN  CONT IV  Last administered on 4/4/20at 22:10;  Start 4/4/20 

at 22:00;  Stop 4/5/20 at 21:59;  Status DC


Magnesium Sulfate 50 ml @ 25 mls/hr PRN DAILY  PRN IV for Mag < 1.7 on am labs 

Last administered on 6/16/20at 08:21;  Start 4/5/20 at 09:15


Sodium Chloride 90 meq/Potassium Chloride 15 meq/ Potassium Phosphate 10 mmol/ 

Magnesium Sulfate 8 meq/Calcium Gluconate 15 meq/ Multivitamins 10 ml/Chromium/ 

Copper/Manganese/ Seleni/Zn 0.5 ml/ Insulin Human Regular 25 unit/ Total 

Parenteral Nutrition/Amino Acids/Dextrose/ Fat Emulsion Intravenous 1,400 ml @  

58.333 mls/ hr TPN  CONT IV  Last administered on 4/5/20at 21:20;  Start 4/5/20 

at 22:00;  Stop 4/6/20 at 21:59;  Status DC


Sodium Chloride 1,000 ml @  1,000 mls/hr Q1H PRN IV hypotension;  Start 4/5/20 

at 12:23;  Stop 4/5/20 at 18:22;  Status DC


Albumin Human 200 ml @  200 mls/hr 1X  ONCE IV  Last administered on 4/5/20at 

13:34;  Start 4/5/20 at 12:30;  Stop 4/5/20 at 13:29;  Status DC


Diphenhydramine HCl (Benadryl) 25 mg 1X PRN  PRN IV ITCHING;  Start 4/5/20 at 

12:30;  Stop 4/6/20 at 12:29;  Status DC


Diphenhydramine HCl (Benadryl) 25 mg 1X PRN  PRN IV ITCHING;  Start 4/5/20 at 

12:30;  Stop 4/6/20 at 12:29;  Status DC


Info (PHARMACY MONITORING -- do not chart) 1 each PRN DAILY  PRN MC SEE 

COMMENTS;  Start 4/5/20 at 12:30;  Status Cancel


Bupivacaine HCl/ Epinephrine Bitart (Sensorcain-Epi 0.5%-1:095681 Mpf) 30 ml 

STK-MED ONCE .ROUTE  Last administered on 4/6/20at 11:44;  Start 4/6/20 at 

11:00;  Stop 4/6/20 at 11:01;  Status DC


Cellulose (Surgicel Fibrillar 1x2) 1 each STK-MED ONCE .ROUTE ;  Start 4/6/20 at

11:00;  Stop 4/6/20 at 11:01;  Status DC


Sodium Chloride 90 meq/Potassium Chloride 15 meq/ Potassium Phosphate 10 mmol/ 

Magnesium Sulfate 12 meq/Calcium Gluconate 15 meq/ Multivitamins 10 ml/Chromium/

Copper/Manganese/ Seleni/Zn 0.5 ml/ Insulin Human Regular 25 unit/ Total 

Parenteral Nutrition/Amino Acids/Dextrose/ Fat Emulsion Intravenous 1,400 ml @  

58.333 mls/ hr TPN  CONT IV  Last administered on 4/6/20at 22:24;  Start 4/6/20 

at 22:00;  Stop 4/7/20 at 21:59;  Status DC


Propofol 20 ml @ As Directed STK-MED ONCE IV ;  Start 4/6/20 at 11:07;  Stop 

4/6/20 at 11:07;  Status DC


Cellulose (Surgicel Hemostat 4x8) 1 each STK-MED ONCE .ROUTE  Last administered 

on 4/6/20at 11:44;  Start 4/6/20 at 11:55;  Stop 4/6/20 at 11:56;  Status DC


Sevoflurane (Ultane) 60 ml STK-MED ONCE IH ;  Start 4/6/20 at 12:46;  Stop 

4/6/20 at 12:46;  Status DC


Sodium Chloride 1,000 ml @  1,000 mls/hr Q1H PRN IV hypotension;  Start 4/6/20 

at 13:51;  Stop 4/6/20 at 19:50;  Status DC


Albumin Human 200 ml @  200 mls/hr 1X PRN  PRN IV Hypotension Last administered 

on 4/6/20at 14:51;  Start 4/6/20 at 14:00;  Stop 4/6/20 at 19:59;  Status DC


Diphenhydramine HCl (Benadryl) 25 mg 1X PRN  PRN IV ITCHING;  Start 4/6/20 at 

14:00;  Stop 4/7/20 at 13:59;  Status DC


Diphenhydramine HCl (Benadryl) 25 mg 1X PRN  PRN IV ITCHING;  Start 4/6/20 at 

14:00;  Stop 4/7/20 at 13:59;  Status DC


Sodium Chloride 1,000 ml @  400 mls/hr Q2H30M PRN IV PATENCY;  Start 4/6/20 at 

13:51;  Stop 4/7/20 at 01:50;  Status DC


Info (PHARMACY MONITORING -- do not chart) 1 each PRN DAILY  PRN MC SEE 

COMMENTS;  Start 4/6/20 at 14:00;  Stop 4/9/20 at 08:16;  Status DC


Heparin Sodium (Porcine) (Hep Lock Adult) 500 unit STK-MED ONCE IVP ;  Start 

4/7/20 at 09:29;  Stop 4/7/20 at 09:30;  Status DC


Sodium Chloride 1,000 ml @  1,000 mls/hr Q1H PRN IV hypotension;  Start 4/7/20 

at 10:43;  Stop 4/7/20 at 16:42;  Status DC


Sodium Chloride 1,000 ml @  400 mls/hr Q2H30M PRN IV PATENCY;  Start 4/7/20 at 

10:43;  Stop 4/7/20 at 22:42;  Status DC


Info (PHARMACY MONITORING -- do not chart) 1 each PRN DAILY  PRN MC SEE C

OMMENTS;  Start 4/7/20 at 10:45;  Status UNV


Info (PHARMACY MONITORING -- do not chart) 1 each PRN DAILY  PRN MC SEE 

COMMENTS;  Start 4/7/20 at 10:45;  Status UNV


Sodium Chloride 90 meq/Potassium Chloride 15 meq/ Magnesium Sulfate 12 

meq/Calcium Gluconate 15 meq/ Multivitamins 10 ml/Chromium/ Copper/Manganese/ 

Seleni/Zn 0.5 ml/ Insulin Human Regular 25 unit/ Total Parenteral Nutrit

ion/Amino Acids/Dextrose/ Fat Emulsion Intravenous 1,400 ml @  58.333 mls/ hr 

TPN  CONT IV  Last administered on 4/7/20at 22:13;  Start 4/7/20 at 22:00;  Stop

4/8/20 at 21:59;  Status DC


Sodium Chloride 1,000 ml @  1,000 mls/hr Q1H PRN IV hypotension;  Start 4/8/20 

at 07:50;  Stop 4/8/20 at 13:49;  Status DC


Albumin Human 200 ml @  200 mls/hr 1X  ONCE IV ;  Start 4/8/20 at 08:00;  Stop 

4/8/20 at 08:53;  Status DC


Diphenhydramine HCl (Benadryl) 25 mg 1X PRN  PRN IV ITCHING;  Start 4/8/20 at 

08:00;  Stop 4/9/20 at 07:59;  Status DC


Diphenhydramine HCl (Benadryl) 25 mg 1X PRN  PRN IV ITCHING;  Start 4/8/20 at 

08:00;  Stop 4/9/20 at 07:59;  Status DC


Info (PHARMACY MONITORING -- do not chart) 1 each PRN DAILY  PRN MC SEE 

COMMENTS;  Start 4/8/20 at 08:00;  Stop 4/9/20 at 08:16;  Status DC


Albumin Human 50 ml @ 50 mls/hr 1X  ONCE IV ;  Start 4/8/20 at 08:53;  Stop 

4/8/20 at 08:56;  Status DC


Albumin Human 200 ml @  50 mls/hr PRN 1X  PRN IV HYPOTENSION Last administered 

on 4/14/20at 11:54;  Start 4/8/20 at 09:00;  Stop 5/21/20 at 11:14;  Status DC


Meropenem 500 mg/ Sodium Chloride 50 ml @  100 mls/hr Q12H IV  Last administered

on 4/28/20at 10:45;  Start 4/8/20 at 10:00;  Stop 4/28/20 at 12:37;  Status DC


Sodium Chloride 90 meq/Magnesium Sulfate 12 meq/ Calcium Gluconate 15 meq/ 

Multivitamins 10 ml/Chromium/ Copper/Manganese/ Seleni/Zn 0.5 ml/ Insulin Human 

Regular 25 unit/ Total Parenteral Nutrition/Amino Acids/Dextrose/ Fat Emulsion 

Intravenous 1,400 ml @  58.333 mls/ hr TPN  CONT IV  Last administered on 

4/8/20at 21:41;  Start 4/8/20 at 22:00;  Stop 4/9/20 at 21:59;  Status DC


Sodium Chloride 1,000 ml @  1,000 mls/hr Q1H PRN IV hypotension;  Start 4/9/20 

at 07:58;  Stop 4/9/20 at 13:57;  Status DC


Albumin Human 200 ml @  200 mls/hr 1X PRN  PRN IV Hypotension Last administered 

on 4/9/20at 09:30;  Start 4/9/20 at 08:00;  Stop 4/9/20 at 13:59;  Status DC


Sodium Chloride 1,000 ml @  400 mls/hr Q2H30M PRN IV PATENCY;  Start 4/9/20 at 

07:58;  Stop 4/9/20 at 19:57;  Status DC


Info (PHARMACY MONITORING -- do not chart) 1 each PRN DAILY  PRN MC SEE 

COMMENTS;  Start 4/9/20 at 08:00;  Status Cancel


Info (PHARMACY MONITORING -- do not chart) 1 each PRN DAILY  PRN MC SEE 

COMMENTS;  Start 4/9/20 at 08:15;  Status UNV


Sodium Chloride 90 meq/Potassium Phosphate 5 mmol/ Magnesium Sulfate 12 

meq/Calcium Gluconate 15 meq/ Multivitamins 10 ml/Chromium/ Copper/Manganese/ 

Seleni/Zn 0.5 ml/ Insulin Human Regular 30 unit/ Total Parenteral 

Nutrition/Amino Acids/Dextrose/ Fat Emulsion Intravenous 1,400 ml @  58.333 mls/

hr TPN  CONT IV  Last administered on 4/9/20at 22:08;  Start 4/9/20 at 22:00;  

Stop 4/10/20 at 21:59;  Status DC


Linezolid/Dextrose 300 ml @  300 mls/hr Q12HR IV  Last administered on 4/20/20at

20:40;  Start 4/10/20 at 11:00;  Stop 4/21/20 at 08:10;  Status DC


Sodium Chloride 90 meq/Potassium Phosphate 15 mmol/ Magnesium Sulfate 12 

meq/Calcium Gluconate 15 meq/ Multivitamins 10 ml/Chromium/ Copper/Manganese/ 

Seleni/Zn 0.5 ml/ Insulin Human Regular 30 unit/ Total Parenteral 

Nutrition/Amino Acids/Dextrose/ Fat Emulsion Intravenous 1,400 ml @  58.333 mls/

hr TPN  CONT IV  Last administered on 4/10/20at 21:49;  Start 4/10/20 at 22:00; 

Stop 4/11/20 at 21:59;  Status DC


Sodium Chloride 90 meq/Potassium Phosphate 15 mmol/ Magnesium Sulfate 12 

meq/Calcium Gluconate 15 meq/ Multivitamins 10 ml/Chromium/ Copper/Manganese/ 

Seleni/Zn 0.5 ml/ Insulin Human Regular 40 unit/ Total Parenteral 

Nutrition/Amino Acids/Dextrose/ Fat Emulsion Intravenous 1,400 ml @  58.333 mls/

hr TPN  CONT IV  Last administered on 4/11/20at 21:21;  Start 4/11/20 at 22:00; 

Stop 4/12/20 at 21:59;  Status DC


Sodium Chloride 1,000 ml @  1,000 mls/hr Q1H PRN IV hypotension;  Start 4/11/20 

at 13:26;  Stop 4/11/20 at 19:25;  Status DC


Albumin Human 200 ml @  200 mls/hr 1X PRN  PRN IV Hypotension Last administered 

on 4/11/20at 15:00;  Start 4/11/20 at 13:30;  Stop 4/11/20 at 19:29;  Status DC


Sodium Chloride (Normal Saline Flush) 10 ml 1X PRN  PRN IV AP catheter pack;  

Start 4/11/20 at 13:30;  Stop 4/12/20 at 13:29;  Status DC


Sodium Chloride (Normal Saline Flush) 10 ml 1X PRN  PRN IV  catheter pack;  

Start 4/11/20 at 13:30;  Stop 4/12/20 at 13:29;  Status DC


Sodium Chloride 1,000 ml @  400 mls/hr Q2H30M PRN IV PATENCY;  Start 4/11/20 at 

13:26;  Stop 4/12/20 at 01:25;  Status DC


Info (PHARMACY MONITORING -- do not chart) 1 each PRN DAILY  PRN MC SEE 

COMMENTS;  Start 4/11/20 at 13:30;  Stop 4/11/20 at 13:33;  Status DC


Info (PHARMACY MONITORING -- do not chart) 1 each PRN DAILY  PRN MC SEE 

COMMENTS;  Start 4/11/20 at 13:30;  Stop 4/11/20 at 13:34;  Status DC


Sodium Chloride 90 meq/Potassium Phosphate 19 mmol/ Magnesium Sulfate 12 

meq/Calcium Gluconate 15 meq/ Multivitamins 10 ml/Chromium/ Copper/Manganese/ 

Seleni/Zn 0.5 ml/ Insulin Human Regular 40 unit/ Total Parenteral 

Nutrition/Amino Acids/Dextrose/ Fat Emulsion Intravenous 1,400 ml @  58.333 mls/

hr TPN  CONT IV  Last administered on 4/12/20at 21:54;  Start 4/12/20 at 22:00; 

Stop 4/13/20 at 21:59;  Status DC


Sodium Chloride 1,000 ml @  1,000 mls/hr Q1H PRN IV hypotension;  Start 4/13/20 

at 09:35;  Stop 4/13/20 at 15:34;  Status DC


Albumin Human 200 ml @  200 mls/hr 1X PRN  PRN IV Hypotension;  Start 4/13/20 at

09:45;  Stop 4/13/20 at 15:44;  Status DC


Diphenhydramine HCl (Benadryl) 25 mg 1X PRN  PRN IV ITCHING;  Start 4/13/20 at 

09:45;  Stop 4/14/20 at 09:44;  Status DC


Diphenhydramine HCl (Benadryl) 25 mg 1X PRN  PRN IV ITCHING;  Start 4/13/20 at 

09:45;  Stop 4/14/20 at 09:44;  Status DC


Sodium Chloride 1,000 ml @  400 mls/hr Q2H30M PRN IV PATENCY;  Start 4/13/20 at 

09:35;  Stop 4/13/20 at 21:34;  Status DC


Info (PHARMACY MONITORING -- do not chart) 1 each PRN DAILY  PRN MC SEE 

COMMENTS;  Start 4/13/20 at 09:45;  Status Cancel


Sodium Chloride 100 meq/Potassium Phosphate 19 mmol/ Magnesium Sulfate 12 

meq/Calcium Gluconate 15 meq/ Multivitamins 10 ml/Chromium/ Copper/Manganese/ Se

aram/Zn 0.5 ml/ Insulin Human Regular 40 unit/ Potassium Chloride 20 meq/ Total 

Parenteral Nutrition/Amino Acids/Dextrose/ Fat Emulsion Intravenous 1,400 ml @  

58.333 mls/ hr TPN  CONT IV  Last administered on 4/13/20at 22:02;  Start 

4/13/20 at 22:00;  Stop 4/14/20 at 21:59;  Status DC


Furosemide (Lasix) 40 mg 1X  ONCE IVP  Last administered on 4/13/20at 14:39;  

Start 4/13/20 at 14:30;  Stop 4/13/20 at 14:31;  Status DC


Metronidazole 100 ml @  100 mls/hr Q8HRS IV  Last administered on 4/21/20at 

06:04;  Start 4/14/20 at 10:00;  Stop 4/21/20 at 08:10;  Status DC


Sodium Chloride 1,000 ml @  1,000 mls/hr Q1H PRN IV hypotension;  Start 4/14/20 

at 08:00;  Stop 4/14/20 at 13:59;  Status DC


Albumin Human 200 ml @  200 mls/hr 1X PRN  PRN IV Hypotension;  Start 4/14/20 at

08:00;  Stop 4/14/20 at 13:59;  Status DC


Sodium Chloride 1,000 ml @  400 mls/hr Q2H30M PRN IV PATENCY;  Start 4/14/20 at 

08:00;  Stop 4/14/20 at 19:59;  Status DC


Info (PHARMACY MONITORING -- do not chart) 1 each PRN DAILY  PRN MC SEE 

COMMENTS;  Start 4/14/20 at 11:30;  Status UNV


Info (PHARMACY MONITORING -- do not chart) 1 each PRN DAILY  PRN MC SEE 

COMMENTS;  Start 4/14/20 at 11:30;  Stop 4/16/20 at 12:13;  Status DC


Sodium Chloride 100 meq/Potassium Phosphate 19 mmol/ Magnesium Sulfate 12 

meq/Calcium Gluconate 15 meq/ Multivitamins 10 ml/Chromium/ Copper/Manganese/ 

Seleni/Zn 0.5 ml/ Insulin Human Regular 40 unit/ Potassium Chloride 20 meq/ 

Total Parenteral Nutrition/Amino Acids/Dextrose/ Fat Emulsion Intravenous 1,400 

ml @  58.333 mls/ hr TPN  CONT IV  Last administered on 4/14/20at 21:52;  Start 

4/14/20 at 22:00;  Stop 4/15/20 at 21:59;  Status DC


Sodium Chloride (Normal Saline Flush) 10 ml QSHIFT  PRN IV AFTER MEDS AND BLOOD 

DRAWS;  Start 4/14/20 at 15:00;  Stop 5/12/20 at 11:27;  Status DC


Sodium Chloride (Normal Saline Flush) 10 ml PRN Q5MIN  PRN IV AFTER MEDS AND 

BLOOD DRAWS;  Start 4/14/20 at 15:00


Sodium Chloride (Normal Saline Flush) 20 ml PRN Q5MIN  PRN IV AFTER MEDS AND 

BLOOD DRAWS;  Start 4/14/20 at 15:00


Sodium Chloride 100 meq/Potassium Phosphate 19 mmol/ Magnesium Sulfate 12 

meq/Calcium Gluconate 15 meq/ Multivitamins 10 ml/Chromium/ Copper/Manganese/ 

Seleni/Zn 0.5 ml/ Insulin Human Regular 40 unit/ Potassium Chloride 20 meq/ 

Total Parenteral Nutrition/Amino Acids/Dextrose/ Fat Emulsion Intravenous 1,400 

ml @  58.333 mls/ hr TPN  CONT IV  Last administered on 4/15/20at 21:20;  Start 

4/15/20 at 22:00;  Stop 4/16/20 at 21:59;  Status DC


Lidocaine HCl (Buffered Lidocaine 1%) 3 ml STK-MED ONCE .ROUTE ;  Start 4/15/20 

at 13:16;  Stop 4/15/20 at 13:16;  Status DC


Lidocaine HCl (Buffered Lidocaine 1%) 6 ml 1X  ONCE INJ  Last administered on 

4/15/20at 13:45;  Start 4/15/20 at 13:30;  Stop 4/15/20 at 13:31;  Status DC


Albumin Human 100 ml @  100 mls/hr 1X  ONCE IV  Last administered on 4/15/20at 

15:41;  Start 4/15/20 at 15:00;  Stop 4/15/20 at 15:59;  Status DC


Albumin Human 50 ml @ 50 mls/hr 1X  ONCE IV  Last administered on 4/15/20at 

15:00;  Start 4/15/20 at 15:00;  Stop 4/15/20 at 15:59;  Status DC


Info (PHARMACY MONITORING -- do not chart) 1 each PRN DAILY  PRN MC SEE 

COMMENTS;  Start 4/16/20 at 11:30;  Status Cancel


Info (PHARMACY MONITORING -- do not chart) 1 each PRN DAILY  PRN MC SEE 

COMMENTS;  Start 4/16/20 at 11:30;  Status UNV


Sodium Chloride 100 meq/Potassium Phosphate 10 mmol/ Magnesium Sulfate 12 

meq/Calcium Gluconate 15 meq/ Multivitamins 10 ml/Chromium/ Copper/Manganese/ 

Seleni/Zn 0.5 ml/ Insulin Human Regular 35 unit/ Potassium Chloride 20 meq/ 

Total Parenteral Nutrition/Amino Acids/Dextrose/ Fat Emulsion Intravenous 1,400 

ml @  58.333 mls/ hr TPN  CONT IV  Last administered on 4/16/20at 22:10;  Start 

4/16/20 at 22:00;  Stop 4/17/20 at 21:59;  Status DC


Sodium Chloride 100 meq/Potassium Phosphate 5 mmol/ Magnesium Sulfate 12 

meq/Calcium Gluconate 15 meq/ Multivitamins 10 ml/Chromium/ Copper/Manganese/ 

Seleni/Zn 0.5 ml/ Insulin Human Regular 35 unit/ Potassium Chloride 20 meq/ 

Total Parenteral Nutrition/Amino Acids/Dextrose/ Fat Emulsion Intravenous 1,400 

ml @  58.333 mls/ hr TPN  CONT IV  Last administered on 4/17/20at 22:59;  Start 

4/17/20 at 22:00;  Stop 4/18/20 at 21:59;  Status DC


Sodium Chloride 1,000 ml @  1,000 mls/hr Q1H PRN IV hypotension;  Start 4/18/20 

at 08:27;  Stop 4/18/20 at 14:26;  Status DC


Albumin Human 200 ml @  200 mls/hr 1X PRN  PRN IV Hypotension Last administered 

on 4/18/20at 09:18;  Start 4/18/20 at 08:30;  Stop 4/18/20 at 14:29;  Status DC


Sodium Chloride 1,000 ml @  400 mls/hr Q2H30M PRN IV PATENCY;  Start 4/18/20 at 

08:27;  Stop 4/18/20 at 20:26;  Status DC


Info (PHARMACY MONITORING -- do not chart) 1 each PRN DAILY  PRN MC SEE 

COMMENTS;  Start 4/18/20 at 08:30;  Status Cancel


Info (PHARMACY MONITORING -- do not chart) 1 each PRN DAILY  PRN MC SEE 

COMMENTS;  Start 4/18/20 at 08:30;  Stop 4/26/20 at 13:10;  Status DC


Sodium Chloride 100 meq/Potassium Chloride 40 meq/ Magnesium Sulfate 15 

meq/Calcium Gluconate 15 meq/ Multivitamins 10 ml/Chromium/ Copper/Manganese/ 

Seleni/Zn 0.5 ml/ Insulin Human Regular 35 unit/ Total Parenteral 

Nutrition/Amino Acids/Dextrose/ Fat Emulsion Intravenous 1,400 ml @  58.333 mls/

hr TPN  CONT IV  Last administered on 4/18/20at 22:00;  Start 4/18/20 at 22:00; 

Stop 4/19/20 at 21:59;  Status DC


Potassium Chloride/Water 100 ml @  100 mls/hr 1X  ONCE IV  Last administered on 

4/18/20at 17:28;  Start 4/18/20 at 14:45;  Stop 4/18/20 at 15:44;  Status DC


Sodium Chloride 100 meq/Potassium Chloride 40 meq/ Magnesium Sulfate 15 

meq/Calcium Gluconate 15 meq/ Multivitamins 10 ml/Chromium/ Copper/Manganese/ 

Seleni/Zn 0.5 ml/ Insulin Human Regular 35 unit/ Total Parenteral 

Nutrition/Amino Acids/Dextrose/ Fat Emulsion Intravenous 1,400 ml @  58.333 mls/

hr TPN  CONT IV  Last administered on 4/19/20at 22:46;  Start 4/19/20 at 22:00; 

Stop 4/20/20 at 21:59;  Status DC


Sodium Chloride 100 meq/Potassium Chloride 40 meq/ Magnesium Sulfate 20 

meq/Calcium Gluconate 15 meq/ Multivitamins 10 ml/Chromium/ Copper/Manganese/ 

Seleni/Zn 0.5 ml/ Insulin Human Regular 35 unit/ Total Parenteral Nu

trition/Amino Acids/Dextrose/ Fat Emulsion Intravenous 1,400 ml @  58.333 mls/ 

hr TPN  CONT IV  Last administered on 4/20/20at 22:31;  Start 4/20/20 at 22:00; 

Stop 4/21/20 at 21:59;  Status DC


Fentanyl Citrate (Fentanyl 2ml Vial) 50 mcg PRN Q2HR  PRN IVP PAIN Last 

administered on 4/27/20at 13:32;  Start 4/20/20 at 21:00;  Stop 4/28/20 at 

12:53;  Status DC


Fentanyl Citrate (Fentanyl 2ml Vial) 25 mcg PRN Q2HR  PRN IVP PAIN;  Start 

4/20/20 at 21:00;  Stop 4/28/20 at 12:54;  Status DC


Enoxaparin Sodium (Lovenox 100mg Syringe) 100 mg Q12HR SQ ;  Start 4/21/20 at 

21:00;  Status UNV


Amino Acids/ Glycerin/ Electrolytes 1,000 ml @  75 mls/hr [id number] IV ;  Start 

4/20/20 at 21:15;  Status UNV


Sodium Chloride 1,000 ml @  1,000 mls/hr Q1H PRN IV hypotension;  Start 4/21/20 

at 07:56;  Stop 4/21/20 at 13:55;  Status DC


Albumin Human 200 ml @  200 mls/hr 1X PRN  PRN IV Hypotension Last administered 

on 4/21/20at 08:40;  Start 4/21/20 at 08:00;  Stop 4/21/20 at 13:59;  Status DC


Sodium Chloride 1,000 ml @  400 mls/hr Q2H30M PRN IV PATENCY;  Start 4/21/20 at 

07:56;  Stop 4/21/20 at 19:55;  Status DC


Info (PHARMACY MONITORING -- do not chart) 1 each PRN DAILY  PRN MC SEE 

COMMENTS;  Start 4/21/20 at 08:00;  Status UNV


Info (PHARMACY MONITORING -- do not chart) 1 each PRN DAILY  PRN MC SEE 

COMMENTS;  Start 4/21/20 at 08:00;  Status UNV


Daptomycin 430 mg/ Sodium Chloride 50 ml @  100 mls/hr Q24H IV  Last ad

ministered on 4/21/20at 12:35;  Start 4/21/20 at 09:00;  Stop 4/21/20 at 12:49; 

Status DC


Sodium Chloride 100 meq/Potassium Chloride 40 meq/ Magnesium Sulfate 20 

meq/Calcium Gluconate 15 meq/ Multivitamins 10 ml/Chromium/ Copper/Manganese/ 

Seleni/Zn 0.5 ml/ Insulin Human Regular 35 unit/ Total Parenteral 

Nutrition/Amino Acids/Dextrose/ Fat Emulsion Intravenous 1,400 ml @  58.333 mls/

hr TPN  CONT IV  Last administered on 4/21/20at 21:26;  Start 4/21/20 at 22:00; 

Stop 4/22/20 at 21:59;  Status DC


Daptomycin 430 mg/ Sodium Chloride 50 ml @  100 mls/hr Q48H IV ;  Start 4/23/20 

at 09:00;  Stop 4/22/20 at 11:55;  Status DC


Sodium Chloride 100 meq/Potassium Chloride 40 meq/ Magnesium Sulfate 20 

meq/Calcium Gluconate 15 meq/ Multivitamins 10 ml/Chromium/ Copper/Manganese/ 

Seleni/Zn 0.5 ml/ Insulin Human Regular 35 unit/ Total Parenteral 

Nutrition/Amino Acids/Dextrose/ Fat Emulsion Intravenous 1,400 ml @  58.333 mls/

hr TPN  CONT IV  Last administered on 4/22/20at 22:27;  Start 4/22/20 at 22:00; 

Stop 4/23/20 at 21:59;  Status DC


Daptomycin 430 mg/ Sodium Chloride 50 ml @  100 mls/hr Q24H IV  Last 

administered on 4/24/20at 15:07;  Start 4/22/20 at 13:00;  Stop 4/25/20 at 

13:15;  Status DC


Sodium Chloride 100 meq/Potassium Chloride 40 meq/ Magnesium Sulfate 20 meq/Ca

lcium Gluconate 10 meq/ Multivitamins 10 ml/Chromium/ Copper/Manganese/ 

Seleni/Zn 0.5 ml/ Insulin Human Regular 35 unit/ Total Parenteral 

Nutrition/Amino Acids/Dextrose/ Fat Emulsion Intravenous 1,400 ml @  58.333 mls/

hr TPN  CONT IV  Last administered on 4/24/20at 00:06;  Start 4/23/20 at 22:00; 

Stop 4/24/20 at 21:59;  Status DC


Alteplase, Recombinant (Cathflo For Central Catheter Clearance) 1 mg 1X  ONCE 

INT CAT  Last administered on 4/24/20at 11:44;  Start 4/24/20 at 10:45;  Stop 

4/24/20 at 10:46;  Status DC


Ondansetron HCl (Zofran) 4 mg PRN Q6HRS  PRN IV NAUSEA/VOMITING;  Start 4/27/20 

at 07:00;  Stop 4/28/20 at 06:59;  Status DC


Fentanyl Citrate (Fentanyl 2ml Vial) 25 mcg PRN Q5MIN  PRN IV MILD PAIN 1-3;  

Start 4/27/20 at 07:00;  Stop 4/28/20 at 06:59;  Status DC


Fentanyl Citrate (Fentanyl 2ml Vial) 50 mcg PRN Q5MIN  PRN IV MODERATE TO SEVERE

PAIN Last administered on 4/27/20at 10:17;  Start 4/27/20 at 07:00;  Stop 

4/28/20 at 06:59;  Status DC


Ringer's Solution 1,000 ml @  30 mls/hr Q24H IV ;  Start 4/27/20 at 07:00;  Stop

4/27/20 at 18:59;  Status DC


Lidocaine HCl (Xylocaine-Mpf 1% 2ml Vial) 2 ml PRN 1X  PRN ID PRIOR TO IV START;

 Start 4/27/20 at 07:00;  Stop 4/28/20 at 06:59;  Status DC


Prochlorperazine Edisylate (Compazine) 5 mg PACU PRN  PRN IV NAUSEA, MRX1;  

Start 4/27/20 at 07:00;  Stop 4/28/20 at 06:59;  Status DC


Sodium Acetate 50 meq/Potassium Acetate 55 meq/ Magnesium Sulfate 20 meq/Calcium

Gluconate 10 meq/ Multivitamins 10 ml/Chromium/ Copper/Manganese/ Seleni/Zn 0.5 

ml/ Insulin Human Regular 35 unit/ Total Parenteral Nutrition/Amino 

Acids/Dextrose/ Fat Emulsion Intravenous 1,400 ml @  58.333 mls/ hr TPN  CONT IV

;  Start 4/24/20 at 22:00;  Stop 4/24/20 at 14:15;  Status DC


Sodium Acetate 50 meq/Potassium Acetate 55 meq/ Magnesium Sulfate 20 meq/Calcium

Gluconate 10 meq/ Multivitamins 10 ml/Chromium/ Copper/Manganese/ Seleni/Zn 0.5 

ml/ Insulin Human Regular 35 unit/ Total Parenteral Nutrition/Amino 

Acids/Dextrose/ Fat Emulsion Intravenous 1,800 ml @  75 mls/hr TPN  CONT IV  

Last administered on 4/24/20at 22:38;  Start 4/24/20 at 22:00;  Stop 4/25/20 at 

21:59;  Status DC


Sodium Chloride 1,000 ml @  1,000 mls/hr Q1H PRN IV hypotension;  Start 4/24/20 

at 15:31;  Stop 4/24/20 at 21:30;  Status DC


Diphenhydramine HCl (Benadryl) 25 mg 1X PRN  PRN IV ITCHING;  Start 4/24/20 at 

15:45;  Stop 4/25/20 at 15:44;  Status DC


Diphenhydramine HCl (Benadryl) 25 mg 1X PRN  PRN IV ITCHING;  Start 4/24/20 at 

15:45;  Stop 4/25/20 at 15:44;  Status DC


Sodium Chloride 1,000 ml @  400 mls/hr Q2H30M PRN IV PATENCY;  Start 4/24/20 at 

15:31;  Stop 4/25/20 at 03:30;  Status DC


Info (PHARMACY MONITORING -- do not chart) 1 each PRN DAILY  PRN MC SEE 

COMMENTS;  Start 4/24/20 at 15:45;  Stop 5/26/20 at 14:14;  Status DC


Sodium Acetate 50 meq/Potassium Acetate 55 meq/ Magnesium Sulfate 20 meq/Calcium

Gluconate 10 meq/ Multivitamins 10 ml/Chromium/ Copper/Manganese/ Seleni/Zn 0.5 

ml/ Insulin Human Regular 35 unit/ Total Parenteral Nutrition/Amino 

Acids/Dextrose/ Fat Emulsion Intravenous 1,800 ml @  75 mls/hr TPN  CONT IV  

Last administered on 4/25/20at 22:03;  Start 4/25/20 at 22:00;  Stop 4/26/20 at 

21:59;  Status DC


Daptomycin 430 mg/ Sodium Chloride 50 ml @  100 mls/hr Q24H IV  Last 

administered on 4/30/20at 13:00;  Start 4/25/20 at 13:00;  Stop 4/30/20 at 

20:58;  Status DC


Heparin Sodium (Porcine) 1000 unit/Sodium Chloride 1,001 ml @  1,001 mls/hr 1X  

ONCE IRR ;  Start 4/27/20 at 06:00;  Stop 4/27/20 at 06:59;  Status DC


Potassium Acetate 55 meq/Magnesium Sulfate 20 meq/ Calcium Gluconate 10 meq/ 

Multivitamins 10 ml/Chromium/ Copper/Manganese/ Seleni/Zn 0.5 ml/ Insulin Human 

Regular 35 unit/ Total Parenteral Nutrition/Amino Acids/Dextrose/ Fat Emulsion I

ntravenous 1,920 ml @  80 mls/hr TPN  CONT IV  Last administered on 4/26/20at 

22:10;  Start 4/26/20 at 22:00;  Stop 4/27/20 at 21:59;  Status DC


Dexamethasone Sodium Phosphate (Decadron) 4 mg STK-MED ONCE .ROUTE ;  Start 

4/27/20 at 10:56;  Stop 4/27/20 at 10:57;  Status DC


Ondansetron HCl (Zofran) 4 mg STK-MED ONCE .ROUTE ;  Start 4/27/20 at 10:56;  

Stop 4/27/20 at 10:57;  Status DC


Rocuronium Bromide (Zemuron) 50 mg STK-MED ONCE .ROUTE ;  Start 4/27/20 at 

10:56;  Stop 4/27/20 at 10:57;  Status DC


Fentanyl Citrate (Fentanyl 2ml Vial) 100 mcg STK-MED ONCE .ROUTE ;  Start 

4/27/20 at 10:56;  Stop 4/27/20 at 10:57;  Status DC


Bupivacaine HCl/ Epinephrine Bitart (Sensorcain-Epi 0.5%-1:[id number] Mpf) 30 ml 

STK-MED ONCE .ROUTE  Last administered on 4/27/20at 12:01;  Start 4/27/20 at 

10:58;  Stop 4/27/20 at 10:58;  Status DC


Cellulose (Surgicel Hemostat 2x14) 1 each STK-MED ONCE .ROUTE ;  Start 4/27/20 

at 10:58;  Stop 4/27/20 at 10:59;  Status DC


Iohexol (Omnipaque 300 Mg/ml) 50 ml STK-MED ONCE .ROUTE ;  Start 4/27/20 at 

10:58;  Stop 4/27/20 at 10:59;  Status DC


Cellulose (Surgicel Hemostat 4x8) 1 each STK-MED ONCE .ROUTE ;  Start 4/27/20 at

10:58;  Stop 4/27/20 at 10:59;  Status DC


Bisacodyl (Dulcolax Supp) 10 mg STK-MED ONCE .ROUTE ;  Start 4/27/20 at 10:59;  

Stop 4/27/20 at 10:59;  Status DC


Heparin Sodium (Porcine) 1000 unit/Sodium Chloride 1,001 ml @  1,001 mls/hr 1X  

ONCE IRR ;  Start 4/27/20 at 12:00;  Stop 4/27/20 at 12:59;  Status DC


Propofol 20 ml @ As Directed STK-MED ONCE IV ;  Start 4/27/20 at 11:05;  Stop 4/ 27/20 at 11:05;  Status DC


Sevoflurane (Ultane) 90 ml STK-MED ONCE IH ;  Start 4/27/20 at 11:05;  Stop 

4/27/20 at 11:05;  Status DC


Sevoflurane (Ultane) 60 ml STK-MED ONCE IH ;  Start 4/27/20 at 12:26;  Stop 

4/27/20 at 12:27;  Status DC


Propofol 20 ml @ As Directed STK-MED ONCE IV ;  Start 4/27/20 at 12:26;  Stop 

4/27/20 at 12:27;  Status DC


Phenylephrine HCl (PHENYLEPHRINE in 0.9% NACL PF) 1 mg STK-MED ONCE IV ;  Start 

4/27/20 at 12:34;  Stop 4/27/20 at 12:34;  Status DC


Heparin Sodium (Porcine) (Heparin Sodium) 5,000 unit Q12HR SQ  Last administered

on 5/6/20at 20:57;  Start 4/27/20 at 21:00;  Stop 5/7/20 at 09:59;  Status DC


Sodium Chloride (Normal Saline Flush) 3 ml QSHIFT  PRN IV AFTER MEDS AND BLOOD 

DRAWS;  Start 4/27/20 at 13:45


Naloxone HCl (Narcan) 0.4 mg PRN Q2MIN  PRN IV SEE INSTRUCTIONS Last 

administered on 6/6/20at 15:15;  Start 4/27/20 at 13:45


Sodium Chloride 1,000 ml @  25 mls/hr Q24H IV  Last administered on 5/26/20at 

13:37;  Start 4/27/20 at 13:37;  Stop 5/29/20 at 13:09;  Status DC


Naloxone HCl (Narcan) 0.4 mg PRN Q2MIN  PRN IV SEE INSTRUCTIONS;  Start 4/27/20 

at 14:30;  Status UNV


Sodium Chloride 1,000 ml @  25 mls/hr Q24H IV ;  Start 4/27/20 at 14:30;  Status

UNV


Hydromorphone HCl 30 ml @ 0 mls/hr CONT PRN  PRN IV PER PROTOCOL Last adminis

tered on 5/2/20at 16:08;  Start 4/27/20 at 14:30;  Stop 5/4/20 at 08:55;  Status

DC


Potassium Acetate 55 meq/Magnesium Sulfate 20 meq/ Calcium Gluconate 10 meq/ 

Multivitamins 10 ml/Chromium/ Copper/Manganese/ Seleni/Zn 0.5 ml/ Insulin Human 

Regular 35 unit/ Total Parenteral Nutrition/Amino Acids/Dextrose/ Fat Emulsion 

Intravenous 1,920 ml @  80 mls/hr TPN  CONT IV  Last administered on 4/27/20at 

22:01;  Start 4/27/20 at 22:00;  Stop 4/28/20 at 21:59;  Status DC


Bumetanide (Bumex) 2 mg [id number] IV  Last administered on 5/1/20at 13:50;  Start 

4/28/20 at 14:00;  Stop 5/2/20 at 14:10;  Status DC


Meropenem 1 gm/ Sodium Chloride 100 ml @  200 mls/hr Q8HRS IV  Last administered

on 5/22/20at 05:53;  Start 4/28/20 at 14:00;  Stop 5/22/20 at 09:31;  Status DC


Potassium Acetate 55 meq/Magnesium Sulfate 20 meq/ Calcium Gluconate 10 meq/ 

Multivitamins 10 ml/Chromium/ Copper/Manganese/ Seleni/Zn 0.5 ml/ Insulin Human 

Regular 35 unit/ Total Parenteral Nutrition/Amino Acids/Dextrose/ Fat Emulsion 

Intravenous 1,920 ml @  80 mls/hr TPN  CONT IV  Last administered on 4/28/20at 

22:02;  Start 4/28/20 at 22:00;  Stop 4/29/20 at 21:59;  Status DC


Hydromorphone HCl (Dilaudid Standard PCA) 12 mg STK-MED ONCE IV ;  Start 4/27/20

at 14:35;  Stop 4/28/20 at 13:53;  Status DC


Artificial Tears (Artificial Tears) 1 drop PRN Q15MIN  PRN OU DRY EYE Last 

administered on 6/15/20at 03:38;  Start 4/29/20 at 05:30


Hydromorphone HCl (Dilaudid Standard PCA) 12 mg STK-MED ONCE IV ;  Start 4/28/20

at 12:05;  Stop 4/29/20 at 09:15;  Status DC


Potassium Acetate 65 meq/Magnesium Sulfate 20 meq/ Calcium Gluconate 10 meq/ 

Multivitamins 10 ml/Chromium/ Copper/Manganese/ Seleni/Zn 0.5 ml/ Insulin Human 

Regular 30 unit/ Total Parenteral Nutrition/Amino Acids/Dextrose/ Fat Emulsion 

Intravenous 1,920 ml @  80 mls/hr TPN  CONT IV  Last administered on 4/29/20at 

22:22;  Start 4/29/20 at 22:00;  Stop 4/30/20 at 21:59;  Status DC


Cyclobenzaprine HCl (Flexeril) 10 mg PRN Q6HRS  PRN PO MUSCLE SPASMS;  Start 

4/30/20 at 10:45


Potassium Acetate 55 meq/Magnesium Sulfate 20 meq/ Calcium Gluconate 10 meq/ 

Multivitamins 10 ml/Chromium/ Copper/Manganese/ Seleni/Zn 0.5 ml/ Insulin Human 

Regular 30 unit/ Total Parenteral Nutrition/Amino Acids/Dextrose/ Fat Emulsion 

Intravenous 1,920 ml @  80 mls/hr TPN  CONT IV  Last administered on 5/1/20at 

01:00;  Start 4/30/20 at 22:00;  Stop 5/1/20 at 21:59;  Status DC


Magnesium Sulfate 50 ml @ 25 mls/hr 1X  ONCE IV  Last administered on 4/30/20at 

17:18;  Start 4/30/20 at 12:45;  Stop 4/30/20 at 14:44;  Status DC


Potassium Chloride/Water 100 ml @  100 mls/hr 1X  ONCE IV  Last administered on 

5/1/20at 11:27;  Start 5/1/20 at 12:00;  Stop 5/1/20 at 12:59;  Status DC


Hydromorphone HCl (Dilaudid Standard PCA) 12 mg STK-MED ONCE IV ;  Start 4/29/20

at 10:50;  Stop 5/1/20 at 11:02;  Status DC


Hydromorphone HCl (Dilaudid Standard PCA) 12 mg STK-MED ONCE IV ;  Start 4/30/20

at 13:47;  Stop 5/1/20 at 11:03;  Status DC


Potassium Acetate 30 meq/Magnesium Sulfate 20 meq/ Calcium Gluconate 10 meq/ 

Multivitamins 10 ml/Chromium/ Copper/Manganese/ Seleni/Zn 0.5 ml/ Insulin Human 

Regular 30 unit/ Potassium Chloride 30 meq/ Total Parenteral Nutrition/Amino 

Acids/Dextrose/ Fat Emulsion Intravenous 1,920 ml @  80 mls/hr TPN  CONT IV  

Last administered on 5/1/20at 22:34;  Start 5/1/20 at 22:00;  Stop 5/2/20 at 

21:59;  Status DC


Potassium Chloride/Water 100 ml @  100 mls/hr Q1H IV  Last administered on 

5/2/20at 13:05;  Start 5/2/20 at 07:00;  Stop 5/2/20 at 10:59;  Status DC


Magnesium Sulfate 50 ml @ 25 mls/hr 1X  ONCE IV  Last administered on 5/2/20at 

10:34;  Start 5/2/20 at 10:30;  Stop 5/2/20 at 12:29;  Status DC


Potassium Chloride 75 meq/ Magnesium Sulfate 20 meq/Calcium Gluconate 10 meq/ 

Multivitamins 10 ml/Chromium/ Copper/Manganese/ Seleni/Zn 0.5 ml/ Insulin Human 

Regular 30 unit/ Total Parenteral Nutrition/Amino Acids/Dextrose/ Fat Emulsion 

Intravenous 1,920 ml @  80 mls/hr TPN  CONT IV  Last administered on 5/2/20at 

21:51;  Start 5/2/20 at 22:00;  Stop 5/3/20 at 22:00;  Status DC


Potassium Chloride 75 meq/ Magnesium Sulfate 20 meq/Calcium Gluconate 10 meq/ 

Multivitamins 10 ml/Chromium/ Copper/Manganese/ Seleni/Zn 0.5 ml/ Insulin Human 

Regular 25 unit/ Total Parenteral Nutrition/Amino Acids/Dextrose/ Fat Emulsion 

Intravenous 1,920 ml @  80 mls/hr TPN  CONT IV  Last administered on 5/3/20at 

22:04;  Start 5/3/20 at 22:00;  Stop 5/4/20 at 21:59;  Status DC


Hydromorphone HCl (Dilaudid) 0.4 mg PRN Q4HRS  PRN IVP PAIN Last administered on

5/4/20at 10:57;  Start 5/4/20 at 09:00;  Stop 5/4/20 at 18:59;  Status DC


Micafungin Sodium 100 mg/Dextrose 100 ml @  100 mls/hr Q24H IV  Last 

administered on 5/26/20at 12:17;  Start 5/4/20 at 11:00;  Stop 5/27/20 at 09:59;

 Status DC


Daptomycin 485 mg/ Sodium Chloride 50 ml @  100 mls/hr Q24H IV  Last 

administered on 5/11/20at 13:10;  Start 5/4/20 at 11:00;  Stop 5/12/20 at 07:44;

 Status DC


Potassium Chloride 75 meq/ Magnesium Sulfate 15 meq/Calcium Gluconate 8 meq/ 

Multivitamins 10 ml/Chromium/ Copper/Manganese/ Seleni/Zn 0.5 ml/ Insulin Human 

Regular 25 unit/ Total Parenteral Nutrition/Amino Acids/Dextrose/ Fat Emulsion 

Intravenous 1,920 ml @  80 mls/hr TPN  CONT IV  Last administered on 5/4/20at 

23:08;  Start 5/4/20 at 22:00;  Stop 5/5/20 at 21:59;  Status DC


Haloperidol Lactate (Haldol Inj) 3 mg 1X  ONCE IVP  Last administered on 

5/4/20at 14:37;  Start 5/4/20 at 14:30;  Stop 5/4/20 at 14:31;  Status DC


Hydromorphone HCl (Dilaudid) 1 mg PRN Q4HRS  PRN IVP PAIN Last administered on 

5/18/20at 06:25;  Start 5/4/20 at 19:00;  Stop 5/18/20 at 17:10;  Status DC


Potassium Chloride 75 meq/ Magnesium Sulfate 15 meq/Calcium Gluconate 8 meq/ 

Multivitamins 10 ml/Chromium/ Copper/Manganese/ Seleni/Zn 0.5 ml/ Insulin Human 

Regular 20 unit/ Total Parenteral Nutrition/Amino Acids/Dextrose/ Fat Emulsion 

Intravenous 1,920 ml @  80 mls/hr TPN  CONT IV  Last administered on 5/5/20at 

22:10;  Start 5/5/20 at 22:00;  Stop 5/6/20 at 21:59;  Status DC


Lidocaine HCl (Buffered Lidocaine 1%) 3 ml STK-MED ONCE .ROUTE ;  Start 5/6/20 

at 11:31;  Stop 5/6/20 at 11:31;  Status DC


Lidocaine HCl (Buffered Lidocaine 1%) 3 ml STK-MED ONCE .ROUTE ;  Start 5/6/20 

at 12:28;  Stop 5/6/20 at 12:29;  Status DC


Lidocaine HCl (Buffered Lidocaine 1%) 6 ml 1X  ONCE INJ  Last administered on 

5/6/20at 12:53;  Start 5/6/20 at 12:45;  Stop 5/6/20 at 12:46;  Status DC


Potassium Chloride 75 meq/ Magnesium Sulfate 15 meq/Calcium Gluconate 8 meq/ 

Multivitamins 10 ml/Chromium/ Copper/Manganese/ Seleni/Zn 0.5 ml/ Insulin Human 

Regular 20 unit/ Total Parenteral Nutrition/Amino Acids/Dextrose/ Fat Emulsion 

Intravenous 1,920 ml @  80 mls/hr TPN  CONT IV  Last administered on 5/6/20at 

22:00;  Start 5/6/20 at 22:00;  Stop 5/7/20 at 21:59;  Status DC


Potassium Chloride 75 meq/ Magnesium Sulfate 15 meq/Calcium Gluconate 8 meq/ 

Multivitamins 10 ml/Chromium/ Copper/Manganese/ Seleni/Zn 0.5 ml/ Insulin Human 

Regular 15 unit/ Total Parenteral Nutrition/Amino Acids/Dextrose/ Fat Emulsion 

Intravenous 1,920 ml @  80 mls/hr TPN  CONT IV  Last administered on 5/7/20at 

22:28;  Start 5/7/20 at 22:00;  Stop 5/8/20 at 21:59;  Status DC


Vecuronium Bromide (Norcuron Bolus) 6 mg PRN Q6HRS  PRN IV VENT ASYNCHRONY;  

Start 5/7/20 at 19:15;  Stop 5/7/20 at 19:35;  Status DC


Bumetanide (Bumex) 2 mg 1X  ONCE IV  Last administered on 5/7/20at 22:09;  Start

5/7/20 at 19:45;  Stop 5/7/20 at 19:46;  Status DC


Lidocaine HCl (Buffered Lidocaine 1%) 3 ml STK-MED ONCE .ROUTE ;  Start 5/8/20 

at 07:59;  Stop 5/8/20 at 07:59;  Status DC


Midazolam HCl (Versed) 5 mg STK-MED ONCE .ROUTE ;  Start 5/8/20 at 08:36;  Stop 

5/8/20 at 08:36;  Status DC


Fentanyl Citrate (Fentanyl 5ml Vial) 250 mcg STK-MED ONCE .ROUTE ;  Start 5/8/20

at 08:36;  Stop 5/8/20 at 08:37;  Status DC


Lidocaine HCl (Buffered Lidocaine 1%) 3 ml 1X  ONCE IJ  Last administered on 

5/8/20at 09:30;  Start 5/8/20 at 09:15;  Stop 5/8/20 at 09:16;  Status DC


Midazolam HCl (Versed) 5 mg 1X  ONCE IV  Last administered on 5/8/20at 09:30;  

Start 5/8/20 at 09:15;  Stop 5/8/20 at 09:16;  Status DC


Fentanyl Citrate (Fentanyl 5ml Vial) 250 mcg 1X  ONCE IV  Last administered on 

5/8/20at 09:30;  Start 5/8/20 at 09:15;  Stop 5/8/20 at 09:16;  Status DC


Bumetanide (Bumex) 2 mg DAILY IV  Last administered on 5/18/20at 08:07;  Start 

5/8/20 at 10:00;  Stop 5/18/20 at 17:15;  Status DC


Potassium Chloride 75 meq/ Magnesium Sulfate 15 meq/ Multivitamins 10 

ml/Chromium/ Copper/Manganese/ Seleni/Zn 0.5 ml/ Insulin Human Regular 15 unit/ 

Total Parenteral Nutrition/Amino Acids/Dextrose/ Fat Emulsion Intravenous 1,920 

ml @  80 mls/hr TPN  CONT IV  Last administered on 5/8/20at 21:59;  Start 5/8/20

at 22:00;  Stop 5/9/20 at 21:59;  Status DC


Metoclopramide HCl (Reglan Vial) 10 mg PRN Q3HRS  PRN IVP NAUSEA/VOMITING-3rd 

choice Last administered on 5/14/20at 04:25;  Start 5/9/20 at 16:45


Potassium Chloride 75 meq/ Magnesium Sulfate 15 meq/ Multivitamins 10 

ml/Chromium/ Copper/Manganese/ Seleni/Zn 0.5 ml/ Insulin Human Regular 15 unit/ 

Total Parenteral Nutrition/Amino Acids/Dextrose/ Fat Emulsion Intravenous 1,920 

ml @  80 mls/hr TPN  CONT IV  Last administered on 5/9/20at 22:41;  Start 5/9/20

at 22:00;  Stop 5/10/20 at 21:59;  Status DC


Magnesium Sulfate 50 ml @ 25 mls/hr 1X  ONCE IV  Last administered on 5/10/20at 

10:44;  Start 5/10/20 at 09:00;  Stop 5/10/20 at 10:59;  Status DC


Potassium Chloride/Water 100 ml @  100 mls/hr 1X  ONCE IV  Last administered on 

5/10/20at 09:37;  Start 5/10/20 at 09:00;  Stop 5/10/20 at 09:59;  Status DC


Duloxetine HCl (Cymbalta) 30 mg DAILY PO  Last administered on 5/11/20at 09:48; 

Start 5/10/20 at 14:00;  Stop 5/13/20 at 10:25;  Status DC


Potassium Chloride 80 meq/ Magnesium Sulfate 20 meq/ Multivitamins 10 

ml/Chromium/ Copper/Manganese/ Seleni/Zn 0.5 ml/ Insulin Human Regular 15 unit/ 

Total Parenteral Nutrition/Amino Acids/Dextrose/ Fat Emulsion Intravenous 1,920 

ml @  80 mls/hr TPN  CONT IV  Last administered on 5/10/20at 21:42;  Start 

5/10/20 at 22:00;  Stop 5/11/20 at 21:59;  Status DC


Potassium Chloride 80 meq/ Magnesium Sulfate 20 meq/ Multivitamins 10 

ml/Chromium/ Copper/Manganese/ Seleni/Zn 0.5 ml/ Insulin Human Regular 15 unit/ 

Total Parenteral Nutrition/Amino Acids/Dextrose/ Fat Emulsion Intravenous 1,920 

ml @  80 mls/hr TPN  CONT IV  Last administered on 5/11/20at 22:20;  Start 5 /11/20 at 22:00;  Stop 5/12/20 at 21:59;  Status DC


Lidocaine HCl (Buffered Lidocaine 1%) 3 ml STK-MED ONCE .ROUTE ;  Start 5/12/20 

at 09:54;  Stop 5/12/20 at 09:55;  Status DC


Hydromorphone HCl (Dilaudid Standard PCA) 12 mg STK-MED ONCE IV ;  Start 5/1/20 

at 15:50;  Stop 5/12/20 at 11:24;  Status DC


Potassium Chloride 80 meq/ Magnesium Sulfate 20 meq/ Multivitamins 10 

ml/Chromium/ Copper/Manganese/ Seleni/Zn 0.5 ml/ Insulin Human Regular 15 unit/ 

Total Parenteral Nutrition/Amino Acids/Dextrose/ Fat Emulsion Intravenous 1,920 

ml @  80 mls/hr TPN  CONT IV  Last administered on 5/12/20at 21:40;  Start 

5/12/20 at 22:00;  Stop 5/13/20 at 21:59;  Status DC


Lidocaine HCl (Buffered Lidocaine 1%) 6 ml 1X  ONCE INJ  Last administered on 

5/12/20at 14:15;  Start 5/12/20 at 14:15;  Stop 5/12/20 at 14:16;  Status DC


Potassium Chloride 80 meq/ Magnesium Sulfate 20 meq/ Multivitamins 10 

ml/Chromium/ Copper/Manganese/ Seleni/Zn 1 ml/ Insulin Human Regular 15 unit/ 

Total Parenteral Nutrition/Amino Acids/Dextrose/ Fat Emulsion Intravenous 1,920 

ml @  80 mls/hr TPN  CONT IV  Last administered on 5/13/20at 22:04;  Start 

5/13/20 at 22:00;  Stop 5/14/20 at 21:59;  Status DC


Potassium Chloride/Water 100 ml @  100 mls/hr 1X  ONCE IV  Last administered on 

5/14/20at 11:34;  Start 5/14/20 at 11:00;  Stop 5/14/20 at 11:59;  Status DC


Potassium Chloride 90 meq/ Magnesium Sulfate 20 meq/ Multivitamins 10 ml/Chromiu

m/ Copper/Manganese/ Seleni/Zn 1 ml/ Insulin Human Regular 15 unit/ Total 

Parenteral Nutrition/Amino Acids/Dextrose/ Fat Emulsion Intravenous 1,920 ml @  

80 mls/hr TPN  CONT IV  Last administered on 5/14/20at 22:57;  Start 5/14/20 at 

22:00;  Stop 5/15/20 at 21:59;  Status DC


Potassium Chloride 90 meq/ Magnesium Sulfate 20 meq/ Multivitamins 10 

ml/Chromium/ Copper/Manganese/ Seleni/Zn 1 ml/ Insulin Human Regular 15 unit/ 

Total Parenteral Nutrition/Amino Acids/Dextrose/ Fat Emulsion Intravenous 1,920 

ml @  80 mls/hr TPN  CONT IV  Last administered on 5/15/20at 22:48;  Start 

5/15/20 at 22:00;  Stop 5/16/20 at 21:59;  Status DC


Potassium Chloride 90 meq/ Magnesium Sulfate 20 meq/ Multivitamins 10 

ml/Chromium/ Copper/Manganese/ Seleni/Zn 1 ml/ Insulin Human Regular 15 unit/ 

Total Parenteral Nutrition/Amino Acids/Dextrose/ Fat Emulsion Intravenous 1,890 

ml @  78.75 mls/ hr TPN  CONT IV  Last administered on 5/16/20at 22:15;  Start 

5/16/20 at 22:00;  Stop 5/17/20 at 21:59;  Status DC


Linezolid/Dextrose 300 ml @  300 mls/hr Q12HR IV  Last administered on 5/19/20at

21:08;  Start 5/17/20 at 09:00;  Stop 5/20/20 at 08:11;  Status DC


Daptomycin 450 mg/ Sodium Chloride 50 ml @  100 mls/hr Q24H IV  Last 

administered on 5/20/20at 09:25;  Start 5/17/20 at 09:00;  Stop 5/21/20 at 

08:30;  Status DC


Potassium Chloride 90 meq/ Magnesium Sulfate 20 meq/ Multivitamins 10 

ml/Chromium/ Copper/Manganese/ Seleni/Zn 1 ml/ Insulin Human Regular 15 unit/ 

Total Parenteral Nutrition/Amino Acids/Dextrose/ Fat Emulsion Intravenous 1,890 

ml @  78.75 mls/ hr TPN  CONT IV  Last administered on 5/17/20at 21:34;  Start 

5/17/20 at 22:00;  Stop 5/18/20 at 21:59;  Status DC


Lorazepam (Ativan Inj) 2 mg STK-MED ONCE .ROUTE ;  Start 5/17/20 at 14:58;  Stop

5/17/20 at 14:58;  Status DC


Metoprolol Tartrate (Lopressor Vial) 5 mg 1X  ONCE IVP  Last administered on 

5/17/20at 15:31;  Start 5/17/20 at 15:15;  Stop 5/17/20 at 15:16;  Status DC


Lorazepam (Ativan Inj) 2 mg 1X  ONCE IVP  Last administered on 5/17/20at 15:30; 

Start 5/17/20 at 15:15;  Stop 5/17/20 at 15:16;  Status DC


Enoxaparin Sodium (Lovenox 40mg Syringe) 40 mg Q24H SQ  Last administered on 

6/5/20at 17:44;  Start 5/17/20 at 17:00;  Stop 6/7/20 at 06:50;  Status DC


Lorazepam (Ativan Inj) 1 mg PRN Q4HRS  PRN IVP ANXIETY / AGITATION MILD-MOD Last

administered on 5/31/20at 15:55;  Start 5/17/20 at 19:15;  Stop 6/2/20 at 11:45;

 Status DC


Lorazepam (Ativan Inj) 2 mg PRN Q4HRS  PRN IVP ANXIETY / AGITATION SEVERE Last 

administered on 6/1/20at 07:55;  Start 5/17/20 at 19:15;  Stop 6/2/20 at 11:45; 

Status DC


Fentanyl Citrate (Fentanyl 2ml Vial) 50 mcg PRN Q4HRS  PRN IVP SEVERE PAIN Last 

administered on 6/13/20at 05:15;  Start 5/18/20 at 13:15;  Stop 6/14/20 at 

09:29;  Status DC


Fentanyl Citrate (Fentanyl 2ml Vial) 25 mcg PRN Q4HRS  PRN IVP MODERATE PAIN 

Last administered on 6/13/20at 00:27;  Start 5/18/20 at 13:15;  Stop 6/14/20 at 

09:30;  Status DC


Potassium Chloride 90 meq/ Magnesium Sulfate 20 meq/ Multivitamins 10 

ml/Chromium/ Copper/Manganese/ Seleni/Zn 1 ml/ Insulin Human Regular 15 unit/ 

Total Parenteral Nutrition/Amino Acids/Dextrose/ Fat Emulsion Intravenous 1,890 

ml @  78.75 mls/ hr TPN  CONT IV  Last administered on 5/18/20at 22:18;  Start 

5/18/20 at 22:00;  Stop 5/19/20 at 21:59;  Status DC


Furosemide (Lasix) 40 mg 1X  ONCE IVP  Last administered on 5/18/20at 21:51;  

Start 5/18/20 at 21:45;  Stop 5/18/20 at 21:48;  Status DC


Albumin Human 100 ml @  100 mls/hr 1X PRN  PRN IV SEE COMMENTS;  Start 5/19/20 

at 01:30


Furosemide (Lasix) 40 mg BID92 IVP  Last administered on 6/3/20at 08:04;  Start 

5/19/20 at 14:00;  Stop 6/3/20 at 13:07;  Status DC


Potassium Chloride 90 meq/ Magnesium Sulfate 20 meq/ Multivitamins 10 

ml/Chromium/ Copper/Manganese/ Seleni/Zn 1 ml/ Insulin Human Regular 15 unit/ 

Total Parenteral Nutrition/Amino Acids/Dextrose/ Fat Emulsion Intravenous 1,800 

ml @  75 mls/hr TPN  CONT IV  Last administered on 5/19/20at 22:31;  Start 

5/19/20 at 22:00;  Stop 5/20/20 at 21:59;  Status DC


Potassium Chloride 90 meq/ Magnesium Sulfate 20 meq/ Multivitamins 10 

ml/Chromium/ Copper/Manganese/ Seleni/Zn 1 ml/ Insulin Human Regular 15 unit/ 

Total Parenteral Nutrition/Amino Acids/Dextrose/ Fat Emulsion Intravenous 1,800 

ml @  75 mls/hr TPN  CONT IV  Last administered on 5/20/20at 22:28;  Start 

5/20/20 at 22:00;  Stop 5/21/20 at 21:59;  Status DC


Potassium Chloride 110 meq/ Magnesium Sulfate 20 meq/ Multivitamins 10 

ml/Chromium/ Copper/Manganese/ Seleni/Zn 1 ml/ Insulin Human Regular 15 unit/ 

Total Parenteral Nutrition/Amino Acids/Dextrose/ Fat Emulsion Intravenous 1,800 

ml @  75 mls/hr TPN  CONT IV  Last administered on 5/21/20at 22:01;  Start 

5/21/20 at 22:00;  Stop 5/22/20 at 21:59;  Status DC


Saliva Substitute (Biotene Moisturizing Mouth) 2 spray PRN Q15MIN  PRN PO DRY 

MOUTH;  Start 5/21/20 at 11:00


Potassium Chloride 110 meq/ Magnesium Sulfate 20 meq/ Multivitamins 10 

ml/Chromium/ Copper/Manganese/ Seleni/Zn 1 ml/ Insulin Human Regular 15 unit/ 

Total Parenteral Nutrition/Amino Acids/Dextrose/ Fat Emulsion Intravenous 1,800 

ml @  75 mls/hr TPN  CONT IV  Last administered on 5/22/20at 22:21;  Start 

5/22/20 at 22:00;  Stop 5/23/20 at 21:59;  Status DC


Potassium Chloride 110 meq/ Magnesium Sulfate 20 meq/ Multivitamins 10 

ml/Chromium/ Copper/Manganese/ Seleni/Zn 1 ml/ Insulin Human Regular 15 unit/ 

Total Parenteral Nutrition/Amino Acids/Dextrose/ Fat Emulsion Intravenous 1,800 

ml @  75 mls/hr TPN  CONT IV  Last administered on 5/23/20at 22:04;  Start 

5/23/20 at 22:00;  Stop 5/24/20 at 21:59;  Status DC


Potassium Chloride 110 meq/ Magnesium Sulfate 20 meq/ Multivitamins 10 

ml/Chromium/ Copper/Manganese/ Seleni/Zn 1 ml/ Insulin Human Regular 15 unit/ 

Total Parenteral Nutrition/Amino Acids/Dextrose/ Fat Emulsion Intravenous 1,800 

ml @  75 mls/hr TPN  CONT IV  Last administered on 5/24/20at 22:48;  Start 

5/24/20 at 22:00;  Stop 5/25/20 at 21:59;  Status DC


Potassium Chloride 70 meq/ Magnesium Sulfate 20 meq/ Multivitamins 10 

ml/Chromium/ Copper/Manganese/ Seleni/Zn 1 ml/ Insulin Human Regular 15 unit/ 

Total Parenteral Nutrition/Amino Acids/Dextrose/ Fat Emulsion Intravenous 1,800 

ml @  75 mls/hr TPN  CONT IV  Last administered on 5/25/20at 21:39;  Start 

5/25/20 at 22:00;  Stop 5/26/20 at 21:59;  Status DC


Meropenem 500 mg/ Sodium Chloride 50 ml @  100 mls/hr Q6HRS IV  Last 

administered on 5/27/20at 06:02;  Start 5/25/20 at 18:00;  Stop 5/27/20 at 

09:59;  Status DC


Barium Sulfate (Varibar Thin Liquid Apple) 148 gm 1X  ONCE PO ;  Start 5/26/20 

at 11:45;  Stop 5/26/20 at 11:49;  Status DC


Potassium Chloride 70 meq/ Magnesium Sulfate 20 meq/ Multivitamins 10 

ml/Chromium/ Copper/Manganese/ Seleni/Zn 1 ml/ Insulin Human Regular 15 unit/ 

Total Parenteral Nutrition/Amino Acids/Dextrose/ Fat Emulsion Intravenous 1,800 

ml @  75 mls/hr TPN  CONT IV  Last administered on 5/26/20at 22:27;  Start 

5/26/20 at 22:00;  Stop 5/27/20 at 21:59;  Status DC


Piperacillin Sod/ Tazobactam Sod 3.375 gm/Sodium Chloride 50 ml @  100 mls/hr 

Q6HRS IV  Last administered on 6/4/20at 06:10;  Start 5/27/20 at 12:00;  Stop 

6/4/20 at 07:26;  Status DC


Potassium Chloride 70 meq/ Magnesium Sulfate 20 meq/ Multivitamins 10 

ml/Chromium/ Copper/Manganese/ Seleni/Zn 1 ml/ Insulin Human Regular 15 unit/ 

Total Parenteral Nutrition/Amino Acids/Dextrose/ Fat Emulsion Intravenous 1,800 

ml @  75 mls/hr TPN  CONT IV  Last administered on 5/27/20at 22:03;  Start 

5/27/20 at 22:00;  Stop 5/28/20 at 21:59;  Status DC


Potassium Chloride 70 meq/ Magnesium Sulfate 20 meq/ Multivitamins 10 

ml/Chromium/ Copper/Manganese/ Seleni/Zn 1 ml/ Insulin Human Regular 15 unit/ 

Total Parenteral Nutrition/Amino Acids/Dextrose/ Fat Emulsion Intravenous 1,800 

ml @  75 mls/hr TPN  CONT IV  Last administered on 5/28/20at 22:33;  Start 

5/28/20 at 22:00;  Stop 5/29/20 at 21:59;  Status DC


Potassium Chloride 70 meq/ Magnesium Sulfate 20 meq/ Multivitamins 10 

ml/Chromium/ Copper/Manganese/ Seleni/Zn 1 ml/ Insulin Human Regular 15 unit/ 

Total Parenteral Nutrition/Amino Acids/Dextrose/ Fat Emulsion Intravenous 1,800 

ml @  75 mls/hr TPN  CONT IV  Last administered on 5/29/20at 23:13;  Start 

5/29/20 at 22:00;  Stop 5/30/20 at 21:59;  Status DC


Potassium Chloride 80 meq/ Magnesium Sulfate 20 meq/ Multivitamins 10 

ml/Chromium/ Copper/Manganese/ Seleni/Zn 1 ml/ Insulin Human Regular 15 unit/ 

Total Parenteral Nutrition/Amino Acids/Dextrose/ Fat Emulsion Intravenous 1,800 

ml @  75 mls/hr TPN  CONT IV  Last administered on 5/30/20at 22:30;  Start 

5/30/20 at 22:00;  Stop 5/31/20 at 21:59;  Status DC


Potassium Chloride 80 meq/ Magnesium Sulfate 20 meq/ Multivitamins 10 

ml/Chromium/ Copper/Manganese/ Seleni/Zn 1 ml/ Insulin Human Regular 15 unit/ 

Total Parenteral Nutrition/Amino Acids/Dextrose/ Fat Emulsion Intravenous 1,800 

ml @  75 mls/hr TPN  CONT IV  Last administered on 5/31/20at 21:54;  Start 

5/31/20 at 22:00;  Stop 6/1/20 at 21:59;  Status DC


Potassium Chloride/Water 100 ml @  100 mls/hr 1X  ONCE IV  Last administered on 

6/1/20at 10:15;  Start 6/1/20 at 10:00;  Stop 6/1/20 at 10:59;  Status DC


Potassium Chloride 90 meq/ Magnesium Sulfate 20 meq/ Multivitamins 10 

ml/Chromium/ Copper/Manganese/ Seleni/Zn 1 ml/ Insulin Human Regular 20 unit/ 

Total Parenteral Nutrition/Amino Acids/Dextrose/ Fat Emulsion Intravenous 1,800 

ml @  75 mls/hr TPN  CONT IV  Last administered on 6/1/20at 22:28;  Start 6/1/20

at 22:00;  Stop 6/2/20 at 21:59;  Status DC


Potassium Chloride 90 meq/ Magnesium Sulfate 20 meq/ Multivitamins 10 

ml/Chromium/ Copper/Manganese/ Seleni/Zn 1 ml/ Insulin Human Regular 20 unit/ 

Total Parenteral Nutrition/Amino Acids/Dextrose/ Fat Emulsion Intravenous 1,800 

ml @  75 mls/hr TPN  CONT IV  Last administered on 6/2/20at 22:08;  Start 6/2/20

at 22:00;  Stop 6/3/20 at 21:59;  Status DC


Lorazepam (Ativan Inj) 0.25 mg PRN Q4HRS  PRN IVP ANXIETY / AGITATION Last 

administered on 6/13/20at 02:25;  Start 6/3/20 at 07:30


Potassium Chloride 90 meq/ Magnesium Sulfate 20 meq/ Multivitamins 10 

ml/Chromium/ Copper/Manganese/ Seleni/Zn 1 ml/ Insulin Human Regular 20 unit/ 

Total Parenteral Nutrition/Amino Acids/Dextrose/ Fat Emulsion Intravenous 1,800 

ml @  75 mls/hr TPN  CONT IV  Last administered on 6/3/20at 23:13;  Start 6/3/20

at 22:00;  Stop 6/4/20 at 21:59;  Status DC


Furosemide (Lasix) 40 mg DAILY IVP  Last administered on 6/5/20at 11:14;  Start 

6/3/20 at 13:30;  Stop 6/7/20 at 09:12;  Status DC


Fluoxetine HCl (PROzac) 20 mg QHS PEG  Last administered on 6/17/20at 22:42;  

Start 6/4/20 at 21:00


Fentanyl (Duragesic 50mcg/ Hr Patch) 1 patch Q72H TD  Last administered on 

6/4/20at 21:22;  Start 6/4/20 at 21:00;  Stop 6/13/20 at 12:00;  Status DC


Potassium Chloride 40 meq/ Potassium Acetate 60 meq/Magnesium Sulfate 10 meq/ 

Multivitamins 10 ml/Chromium/ Copper/Manganese/ Seleni/Zn 1 ml/ Insulin Human 

Regular 20 unit/ Total Parenteral Nutrition/Amino Acids/Dextrose/ Fat Emulsion 

Intravenous 1,800 ml @  75 mls/hr TPN  CONT IV  Last administered on 6/5/20at 

00:03;  Start 6/4/20 at 22:00;  Stop 6/5/20 at 21:59;  Status DC


Potassium Acetate 80 meq/Magnesium Sulfate 5 meq/ Multivitamins 10 ml/Chromium/ 

Copper/Manganese/ Seleni/Zn 1 ml/ Insulin Human Regular 20 unit/ Total 

Parenteral Nutrition/Amino Acids/Dextrose/ Fat Emulsion Intravenous 1,920 ml @  

80 mls/hr TPN  CONT IV  Last administered on 6/5/20at 21:59;  Start 6/5/20 at 

22:00;  Stop 6/6/20 at 21:59;  Status DC


Potassium Acetate 60 meq/Magnesium Sulfate 5 meq/ Multivitamins 10 ml/Chromium/ 

Copper/Manganese/ Seleni/Zn 1 ml/ Insulin Human Regular 30 unit/ Total 

Parenteral Nutrition/Amino Acids/Dextrose/ Fat Emulsion Intravenous 1,920 ml @  

80 mls/hr TPN  CONT IV  Last administered on 6/6/20at 21:54;  Start 6/6/20 at 

22:00;  Stop 6/7/20 at 21:59;  Status DC


Norepinephrine Bitartrate 8 mg/ Dextrose 258 ml @  13.332 mls/ hr CONT  PRN IV 

PER PROTOCOL Last administered on 6/7/20at 21:46;  Start 6/7/20 at 06:30


Albumin Human 500 ml @  125 mls/hr 1X  ONCE IV  Last administered on 6/7/20at 

08:10;  Start 6/7/20 at 08:15;  Stop 6/7/20 at 12:14;  Status DC


Potassium Acetate 40 meq/Magnesium Sulfate 5 meq/ Multivitamins 10 ml/Chromium/ 

Copper/Manganese/ Seleni/Zn 1 ml/ Insulin Human Regular 30 unit/ Total 

Parenteral Nutrition/Amino Acids/Dextrose/ Fat Emulsion Intravenous 1,920 ml @  

80 mls/hr TPN  CONT IV  Last administered on 6/7/20at 22:23;  Start 6/7/20 at 

22:00;  Stop 6/8/20 at 21:59;  Status DC


Meropenem 1 gm/ Sodium Chloride 100 ml @  200 mls/hr Q8HRS IV ;  Start 6/7/20 at

14:00;  Status Cancel


Meropenem 1 gm/ Sodium Chloride 100 ml @  200 mls/hr Q8HRS IV  Last administered

on 6/7/20at 11:04;  Start 6/7/20 at 10:00;  Stop 6/7/20 at 13:00;  Status DC


Meropenem 1 gm/ Sodium Chloride 100 ml @  200 mls/hr Q12HR IV  Last administered

on 6/18/20at 08:17;  Start 6/7/20 at 21:00


Sodium Chloride 1,000 ml @  1,000 mls/hr 1X  ONCE IV  Last administered on 

6/7/20at 11:06;  Start 6/7/20 at 10:45;  Stop 6/7/20 at 11:44;  Status DC


Micafungin Sodium 100 mg/Dextrose 100 ml @  100 mls/hr Q24H IV  Last 

administered on 6/17/20at 10:41;  Start 6/7/20 at 11:00


Daptomycin 410 mg/ Sodium Chloride 50 ml @  100 mls/hr Q24H IV  Last 

administered on 6/9/20at 13:33;  Start 6/7/20 at 14:00;  Stop 6/10/20 at 08:30; 

Status DC


Midazolam HCl (Versed) 2 mg STK-MED ONCE .ROUTE ;  Start 6/7/20 at 14:47;  Stop 

6/7/20 at 14:48;  Status DC


Fentanyl Citrate (Fentanyl 2ml Vial) 100 mcg STK-MED ONCE .ROUTE ;  Start 6/7/20

at 14:47;  Stop 6/7/20 at 14:48;  Status DC


Flumazenil (Romazicon) 0.5 mg STK-MED ONCE IV ;  Start 6/7/20 at 14:48;  Stop 

6/7/20 at 14:48;  Status DC


Naloxone HCl (Narcan) 0.4 mg STK-MED ONCE .ROUTE ;  Start 6/7/20 at 14:48;  Stop

6/7/20 at 14:48;  Status DC


Lidocaine HCl (Lidocaine 1% 20ml Vial) 20 ml STK-MED ONCE .ROUTE ;  Start 6/7/20

at 14:48;  Stop 6/7/20 at 14:48;  Status DC


Midazolam HCl (Versed) 2 mg 1X  ONCE IV  Last administered on 6/7/20at 15:28;  

Start 6/7/20 at 15:00;  Stop 6/7/20 at 15:01;  Status DC


Fentanyl Citrate (Fentanyl 2ml Vial) 100 mcg 1X  ONCE IV  Last administered on 

6/7/20at 15:28;  Start 6/7/20 at 15:00;  Stop 6/7/20 at 15:01;  Status DC


Lidocaine HCl (Lidocaine 1% 20ml Vial) 20 ml 1X  ONCE INJ  Last administered on 

6/7/20at 15:30;  Start 6/7/20 at 15:00;  Stop 6/7/20 at 15:01;  Status DC


Sodium Chloride 1,000 ml @  100 mls/hr Q10H IV  Last administered on 6/16/20at 

07:30;  Start 6/7/20 at 20:00;  Stop 6/16/20 at 11:26;  Status DC


Sodium Bicarbonate (Sodium Bicarb Adult 8.4% Syr) 50 meq 1X  ONCE IV  Last 

administered on 6/7/20at 21:47;  Start 6/7/20 at 22:00;  Stop 6/7/20 at 22:01;  

Status DC


Potassium Acetate 40 meq/Magnesium Sulfate 5 meq/ Multivitamins 10 ml/Chromium/ 

Copper/Manganese/ Seleni/Zn 1 ml/ Insulin Human Regular 30 unit/ Total 

Parenteral Nutrition/Amino Acids/Dextrose/ Fat Emulsion Intravenous 1,920 ml @  

80 mls/hr TPN  CONT IV  Last administered on 6/8/20at 22:28;  Start 6/8/20 at 

22:00;  Stop 6/9/20 at 21:59;  Status DC


Sodium Chloride 500 ml @  500 mls/hr 1X  ONCE IV  Last administered on 6/9/20at 

06:39;  Start 6/9/20 at 06:45;  Stop 6/9/20 at 07:44;  Status DC


Potassium Acetate 40 meq/Magnesium Sulfate 5 meq/ Multivitamins 10 ml/Chromium/ 

Copper/Manganese/ Seleni/Zn 1 ml/ Insulin Human Regular 30 unit/ Total 

Parenteral Nutrition/Amino Acids/Dextrose/ Fat Emulsion Intravenous 1,920 ml @  

80 mls/hr TPN  CONT IV  Last administered on 6/9/20at 22:03;  Start 6/9/20 at 

22:00;  Stop 6/10/20 at 21:59;  Status DC


Metoprolol Tartrate (Lopressor Vial) 5 mg PRN Q6HRS  PRN IVP HYPERTENSION Last 

administered on 6/17/20at 10:10;  Start 6/10/20 at 09:00


Potassium Acetate 40 meq/Magnesium Sulfate 5 meq/ Multivitamins 10 ml/Chromium/ 

Copper/Manganese/ Seleni/Zn 1 ml/ Insulin Human Regular 30 unit/ Total 

Parenteral Nutrition/Amino Acids/Dextrose/ Fat Emulsion Intravenous 1,920 ml @  

80 mls/hr TPN  CONT IV  Last administered on 6/10/20at 21:26;  Start 6/10/20 at 

22:00;  Stop 6/11/20 at 21:59;  Status DC


Potassium Acetate 40 meq/Magnesium Sulfate 5 meq/ Multivitamins 10 ml/Chromium/ 

Copper/Manganese/ Seleni/Zn 1 ml/ Insulin Human Regular 30 unit/ Total 

Parenteral Nutrition/Amino Acids/Dextrose/ Fat Emulsion Intravenous 1,920 ml @  

80 mls/hr TPN  CONT IV  Last administered on 6/11/20at 23:23;  Start 6/11/20 at 

22:00;  Stop 6/12/20 at 21:59;  Status DC


Potassium Acetate 40 meq/Magnesium Sulfate 5 meq/ Multivitamins 10 ml/Chromium/ 

Copper/Manganese/ Seleni/Zn 1 ml/ Insulin Human Regular 30 unit/ Total 

Parenteral Nutrition/Amino Acids/Dextrose/ Fat Emulsion Intravenous 1,920 ml @  

80 mls/hr TPN  CONT IV  Last administered on 6/12/20at 21:35;  Start 6/12/20 at 

22:00;  Stop 6/13/20 at 21:59;  Status DC


Furosemide (Lasix) 20 mg 1X  ONCE IVP  Last administered on 6/13/20at 06:26;  

Start 6/13/20 at 06:15;  Stop 6/13/20 at 06:16;  Status DC


Methylprednisolone Sodium Succinate (SOLU-Medrol 125MG VIAL) 125 mg 1X  ONCE IV 

Last administered on 6/13/20at 06:26;  Start 6/13/20 at 06:15;  Stop 6/13/20 at 

06:16;  Status DC


Albuterol/ Ipratropium (Duoneb) 3 ml Q4HRS NEB  Last administered on 6/18/20at 

07:48;  Start 6/13/20 at 08:00


Fentanyl Citrate 30 ml @ 0 mls/hr CONT  PRN IV SEE PROTOCOL Last administered on

6/18/20at 08:31;  Start 6/13/20 at 06:00


Propofol 100 ml @ 0 mls/hr CONT  PRN IV SEE PROTOCOL Last administered on 

6/17/20at 22:22;  Start 6/13/20 at 06:00


Fentanyl Citrate (Fentanyl 2ml Vial) 25 mcg PRN Q1HR  PRN IV SEE COMMENTS;  

Start 6/13/20 at 06:00


Fentanyl Citrate (Fentanyl 2ml Vial) 50 mcg PRN Q1HR  PRN IV SEE COMMENTS;  

Start 6/13/20 at 06:00


Chlorhexidine Gluconate (Peridex) 15 ml BID MM ;  Start 6/13/20 at 09:00;  Stop 

6/13/20 at 07:58;  Status DC


Potassium Acetate 40 meq/Magnesium Sulfate 5 meq/ Multivitamins 10 ml/Chromium/ 

Copper/Manganese/ Seleni/Zn 1 ml/ Insulin Human Regular 30 unit/ Total 

Parenteral Nutrition/Amino Acids/Dextrose/ Fat Emulsion Intravenous 1,920 ml @  

80 mls/hr TPN  CONT IV  Last administered on 6/13/20at 21:19;  Start 6/13/20 at 

22:00;  Stop 6/14/20 at 21:59;  Status DC


Acetylcysteine (Mucomyst 20% Resp Treatment) 600 mg BID NEB  Last administered 

on 6/18/20at 07:49;  Start 6/13/20 at 21:00


Magnesium Sulfate 100 ml @  25 mls/hr 1X  ONCE IV  Last administered on 

6/13/20at 15:48;  Start 6/13/20 at 15:45;  Stop 6/13/20 at 19:44;  Status DC


Potassium Acetate 40 meq/Magnesium Sulfate 5 meq/ Multivitamins 10 ml/Chromium/ 

Copper/Manganese/ Seleni/Zn 1 ml/ Insulin Human Regular 30 unit/ Total 

Parenteral Nutrition/Amino Acids/Dextrose/ Fat Emulsion Intravenous 1,920 ml @  

80 mls/hr TPN  CONT IV  Last administered on 6/14/20at 21:35;  Start 6/14/20 at 

22:00;  Stop 6/15/20 at 21:59;  Status DC


Potassium Chloride/Water 100 ml @  100 mls/hr Q1H IV  Last administered on 

6/15/20at 08:31;  Start 6/15/20 at 07:00;  Stop 6/15/20 at 08:59;  Status DC


Potassium Acetate 40 meq/Magnesium Sulfate 5 meq/ Multivitamins 10 ml/Chromium/ 

Copper/Manganese/ Seleni/Zn 1 ml/ Insulin Human Regular 30 unit/ Total 

Parenteral Nutrition/Amino Acids/Dextrose/ Fat Emulsion Intravenous 1,920 ml @  

80 mls/hr TPN  CONT IV  Last administered on 6/15/20at 21:54;  Start 6/15/20 at 

22:00;  Stop 6/16/20 at 19:34;  Status DC


Lidocaine HCl (Buffered Lidocaine 1%) 3 ml STK-MED ONCE .ROUTE ;  Start 6/15/20 

at 12:14;  Stop 6/15/20 at 12:14;  Status DC


Lidocaine HCl (Buffered Lidocaine 1%) 3 ml 1X  ONCE IJ  Last administered on 

6/15/20at 13:11;  Start 6/15/20 at 13:00;  Stop 6/15/20 at 13:01;  Status DC


Magnesium Sulfate 50 ml @ 25 mls/hr 1X  ONCE IV ;  Start 6/16/20 at 08:15;  Stop

6/16/20 at 10:14;  Status DC


Potassium Acetate 40 meq/Magnesium Sulfate 10 meq/ Multivitamins 10 ml/Chromium/

Copper/Manganese/ Seleni/Zn 1 ml/ Insulin Human Regular 20 unit/ Total 

Parenteral Nutrition/Amino Acids/Dextrose/ Fat Emulsion Intravenous 1,920 ml @  

80 mls/hr TPN  CONT IV  Last administered on 6/16/20at 21:32;  Start 6/16/20 at 

22:00;  Stop 6/17/20 at 21:59;  Status DC


Potassium Chloride/Water 100 ml @  100 mls/hr Q1H IV  Last administered on 

6/17/20at 09:12;  Start 6/17/20 at 08:00;  Stop 6/17/20 at 09:59;  Status DC


Alteplase, Recombinant (Cathflo For Central Catheter Clearance) 4 mg 1X  ONCE 

INT CAT ;  Start 6/17/20 at 09:15;  Stop 6/17/20 at 09:16;  Status UNV


Alteplase, Recombinant (Cathflo For Central Catheter Clearance) 4 mg 1X  ONCE 

INT CAT ;  Start 6/17/20 at 09:15;  Stop 6/17/20 at 09:16;  Status UNV


Alteplase, Recombinant (Cathflo For Central Catheter Clearance) 4 mg 1X  ONCE 

INT CAT ;  Start 6/17/20 at 09:15;  Stop 6/17/20 at 09:16;  Status UNV


Alteplase, Recombinant 4 mg/ Sodium Chloride 20 ml @ 20 mls/hr 1X  ONCE IV  Last

administered on 6/17/20at 10:10;  Start 6/17/20 at 10:00;  Stop 6/17/20 at 

10:59;  Status DC


Alteplase, Recombinant 4 mg/ Sodium Chloride 20 ml @ 20 mls/hr 1X  ONCE IV  Last

administered on 6/17/20at 10:09;  Start 6/17/20 at 10:00;  Stop 6/17/20 at 

10:59;  Status DC


Alteplase, Recombinant 4 mg/ Sodium Chloride 20 ml @ 20 mls/hr 1X  ONCE IV  Last

administered on 6/17/20at 10:09;  Start 6/17/20 at 10:00;  Stop 6/17/20 at 

10:59;  Status DC


Potassium Acetate 60 meq/Magnesium Sulfate 10 meq/ Multivitamins 10 ml/Chromium/

Copper/Manganese/ Seleni/Zn 1 ml/ Insulin Human Regular 20 unit/ Total 

Parenteral Nutrition/Amino Acids/Dextrose/ Fat Emulsion Intravenous 1,920 ml @  

80 mls/hr TPN  CONT IV  Last administered on 6/17/20at 21:55;  Start 6/17/20 at 

22:00;  Stop 6/18/20 at 21:59





Active Scripts


Active


Reported


Bisoprolol Fumarate 5 Mg Tablet 10 Mg PO DAILY


Vitals/I & O





Vital Sign - Last 24 Hours








 6/17/20 6/17/20 6/17/20 6/17/20





 09:14 10:00 10:07 10:10


 


Pulse  132  132


 


Resp  39  


 


B/P (MAP)  178/64 (102)  186/69


 


Pulse Ox 100 96 100 


 


O2 Delivery Ventilator Tracheal Collar Trach Collar 40% FIO2 





 6/17/20 6/17/20 6/17/20 6/17/20





 11:00 11:46 11:46 11:59


 


Pulse 109   


 


Resp 20   


 


B/P (MAP) 108/58 (75)   


 


Pulse Ox 98   99


 


O2 Delivery Ventilator  Mechanical Ventilator Ventilator





 6/17/20 6/17/20 6/17/20 6/17/20





 12:00 13:00 14:00 15:00


 


Temp 99.0   





 99.0   


 


Pulse 106 106 111 120


 


Resp 20 20 20 20


 


B/P (MAP) 136/58 (84) 158/70 (99) 109/54 (72) 124/67 (86)


 


Pulse Ox 100 100 100 100


 


O2 Delivery Ventilator Ventilator Ventilator Ventilator


 


    





    





 6/17/20 6/17/20 6/17/20 6/17/20





 15:30 16:00 16:00 16:00


 


Temp  98.8  





  98.8  


 


Pulse  114  


 


Resp  20  


 


B/P (MAP)  108/54 (72)  


 


Pulse Ox 99 100  


 


O2 Delivery Ventilator Ventilator  Mechanical Ventilator


 


    





    





 6/17/20 6/17/20 6/17/20 6/17/20





 17:00 18:00 18:18 19:00


 


Pulse 112 108  96


 


Resp 20 20  20


 


B/P (MAP) 108/52 (70) 106/52 (70)  124/52 (76)


 


Pulse Ox 100 100 100 100


 


O2 Delivery Ventilator Ventilator Ventilator Ventilator





 6/17/20 6/17/20 6/17/20 6/17/20





 20:00 20:00 20:00 20:00


 


Temp    98.6





    98.6


 


Pulse    104


 


Resp    20


 


B/P (MAP)    116/54 (74)


 


Pulse Ox 99   99


 


O2 Delivery Ventilator  Mechanical Ventilator Ventilator


 


    





    





 6/17/20 6/17/20 6/17/20 6/17/20





 21:00 22:00 23:00 23:35


 


Pulse 102 106 110 


 


Resp 20 20 22 


 


B/P (MAP) 120/54 (76) 122/58 (79) 156/70 (98) 


 


Pulse Ox 100 100 100 100


 


O2 Delivery Ventilator Ventilator Ventilator Ventilator





 6/18/20 6/18/20 6/18/20 6/18/20





 00:00 00:00 00:00 01:00


 


Temp 99.1   





 99.1   


 


Pulse 104   96


 


Resp 20   20


 


B/P (MAP) 120/54 (76)   128/54 (78)


 


Pulse Ox 100   100


 


O2 Delivery Ventilator Mechanical Ventilator  Ventilator


 


    





    





 6/18/20 6/18/20 6/18/20 6/18/20





 02:00 02:55 03:00 04:00


 


Pulse 100  116 


 


Resp 20  20 


 


B/P (MAP) 128/56 (80)  146/58 (87) 


 


Pulse Ox 99 100 100 


 


O2 Delivery Ventilator Ventilator Ventilator 





 6/18/20 6/18/20 6/18/20 6/18/20





 04:00 04:00 04:35 05:00


 


Temp 98.1   





 98.1   


 


Pulse 116   110


 


Resp 20   20


 


B/P (MAP) 132/58 (82)   118/56 (76)


 


Pulse Ox 100  100 100


 


O2 Delivery Ventilator Mechanical Ventilator Ventilator Ventilator


 


    





    





 6/18/20 6/18/20 6/18/20 6/18/20





 06:00 07:00 07:51 08:00


 


Pulse 118 108  


 


Resp 20 20  


 


B/P (MAP) 122/58 (79) 122/52 (75)  


 


Pulse Ox 100 100 100 


 


O2 Delivery Ventilator Ventilator Ventilator 





 6/18/20 6/18/20 6/18/20 6/18/20





 08:00 08:00 08:31 09:00


 


Temp 100.2   





 100.2   


 


Pulse 104   112


 


Resp 20  20 20


 


B/P (MAP) 146/56 (86)   140/56 (84)


 


Pulse Ox 100  100 100


 


O2 Delivery Ventilator Mechanical Ventilator Ventilator Ventilator














Intake and Output   


 


 6/17/20 6/17/20 6/18/20





 15:00 23:00 07:00


 


Intake Total 200 ml 1656.07 ml 611.8 ml


 


Output Total 827 ml 665 ml 520 ml


 


Balance -627 ml 991.07 ml 91.8 ml











Hemodynamically unstable?:  No


Is patient in severe pain?:  No


Is NPO status required?:  No











OVIDIO SARAVIA MD          Jun 18, 2020 09:02

## 2020-06-18 NOTE — NUR
SS following up with discharge planning. SS reviewed pt chart and discussed with pt RN. No 
new changes from yesterday. Pt remains on the vent at  and trach shield during the day. Pt 
Max Assist with PT/OT and staff. Pt on TPN and IV Meropenem and Micafungin. Pt has chest 
tube and drains x3. SS will continue to follow for discharge planning.

## 2020-06-19 VITALS — SYSTOLIC BLOOD PRESSURE: 100 MMHG | DIASTOLIC BLOOD PRESSURE: 54 MMHG

## 2020-06-19 VITALS — SYSTOLIC BLOOD PRESSURE: 134 MMHG | DIASTOLIC BLOOD PRESSURE: 77 MMHG

## 2020-06-19 VITALS — DIASTOLIC BLOOD PRESSURE: 61 MMHG | SYSTOLIC BLOOD PRESSURE: 99 MMHG

## 2020-06-19 VITALS — DIASTOLIC BLOOD PRESSURE: 72 MMHG | SYSTOLIC BLOOD PRESSURE: 108 MMHG

## 2020-06-19 VITALS — DIASTOLIC BLOOD PRESSURE: 58 MMHG | SYSTOLIC BLOOD PRESSURE: 105 MMHG

## 2020-06-19 VITALS — SYSTOLIC BLOOD PRESSURE: 96 MMHG | DIASTOLIC BLOOD PRESSURE: 62 MMHG

## 2020-06-19 VITALS — DIASTOLIC BLOOD PRESSURE: 94 MMHG | SYSTOLIC BLOOD PRESSURE: 108 MMHG

## 2020-06-19 VITALS — DIASTOLIC BLOOD PRESSURE: 59 MMHG | SYSTOLIC BLOOD PRESSURE: 102 MMHG

## 2020-06-19 VITALS — SYSTOLIC BLOOD PRESSURE: 102 MMHG | DIASTOLIC BLOOD PRESSURE: 58 MMHG

## 2020-06-19 VITALS — DIASTOLIC BLOOD PRESSURE: 55 MMHG | SYSTOLIC BLOOD PRESSURE: 93 MMHG

## 2020-06-19 VITALS — DIASTOLIC BLOOD PRESSURE: 84 MMHG | SYSTOLIC BLOOD PRESSURE: 139 MMHG

## 2020-06-19 VITALS — DIASTOLIC BLOOD PRESSURE: 74 MMHG | SYSTOLIC BLOOD PRESSURE: 120 MMHG

## 2020-06-19 VITALS — DIASTOLIC BLOOD PRESSURE: 64 MMHG | SYSTOLIC BLOOD PRESSURE: 101 MMHG

## 2020-06-19 VITALS — DIASTOLIC BLOOD PRESSURE: 59 MMHG | SYSTOLIC BLOOD PRESSURE: 107 MMHG

## 2020-06-19 VITALS — SYSTOLIC BLOOD PRESSURE: 114 MMHG | DIASTOLIC BLOOD PRESSURE: 72 MMHG

## 2020-06-19 VITALS — SYSTOLIC BLOOD PRESSURE: 95 MMHG | DIASTOLIC BLOOD PRESSURE: 49 MMHG

## 2020-06-19 VITALS — DIASTOLIC BLOOD PRESSURE: 61 MMHG | SYSTOLIC BLOOD PRESSURE: 98 MMHG

## 2020-06-19 VITALS — DIASTOLIC BLOOD PRESSURE: 64 MMHG | SYSTOLIC BLOOD PRESSURE: 97 MMHG

## 2020-06-19 VITALS — DIASTOLIC BLOOD PRESSURE: 63 MMHG | SYSTOLIC BLOOD PRESSURE: 107 MMHG

## 2020-06-19 VITALS — DIASTOLIC BLOOD PRESSURE: 61 MMHG | SYSTOLIC BLOOD PRESSURE: 104 MMHG

## 2020-06-19 VITALS — DIASTOLIC BLOOD PRESSURE: 70 MMHG | SYSTOLIC BLOOD PRESSURE: 103 MMHG

## 2020-06-19 VITALS — DIASTOLIC BLOOD PRESSURE: 55 MMHG | SYSTOLIC BLOOD PRESSURE: 94 MMHG

## 2020-06-19 VITALS — SYSTOLIC BLOOD PRESSURE: 101 MMHG | DIASTOLIC BLOOD PRESSURE: 65 MMHG

## 2020-06-19 VITALS — SYSTOLIC BLOOD PRESSURE: 116 MMHG | DIASTOLIC BLOOD PRESSURE: 68 MMHG

## 2020-06-19 LAB
ALBUMIN SERPL-MCNC: 1.2 G/DL (ref 3.4–5)
ALBUMIN/GLOB SERPL: 0.4 {RATIO} (ref 1–1.7)
ALP SERPL-CCNC: 92 U/L (ref 46–116)
ALT SERPL-CCNC: 18 U/L (ref 14–59)
ANION GAP SERPL CALC-SCNC: 6 MMOL/L (ref 6–14)
AST SERPL-CCNC: 17 U/L (ref 15–37)
BASOPHILS # BLD AUTO: 0 X10^3/UL (ref 0–0.2)
BASOPHILS NFR BLD: 0 % (ref 0–3)
BILIRUB SERPL-MCNC: 0.6 MG/DL (ref 0.2–1)
BUN SERPL-MCNC: 18 MG/DL (ref 7–20)
BUN/CREAT SERPL: 30 (ref 6–20)
CALCIUM SERPL-MCNC: 9.3 MG/DL (ref 8.5–10.1)
CHLORIDE SERPL-SCNC: 105 MMOL/L (ref 98–107)
CO2 SERPL-SCNC: 26 MMOL/L (ref 21–32)
CREAT SERPL-MCNC: 0.6 MG/DL (ref 0.6–1)
EOSINOPHIL NFR BLD: 0.1 X10^3/UL (ref 0–0.7)
EOSINOPHIL NFR BLD: 1 % (ref 0–3)
ERYTHROCYTE [DISTWIDTH] IN BLOOD BY AUTOMATED COUNT: 18.2 % (ref 11.5–14.5)
GFR SERPLBLD BASED ON 1.73 SQ M-ARVRAT: 106.3 ML/MIN
GLOBULIN SER-MCNC: 3.2 G/DL (ref 2.2–3.8)
GLUCOSE SERPL-MCNC: 109 MG/DL (ref 70–99)
HCT VFR BLD CALC: 24.9 % (ref 36–47)
HGB BLD-MCNC: 8.2 G/DL (ref 12–15.5)
LYMPHOCYTES # BLD: 1.3 X10^3/UL (ref 1–4.8)
LYMPHOCYTES NFR BLD AUTO: 15 % (ref 24–48)
MCH RBC QN AUTO: 28 PG (ref 25–35)
MCHC RBC AUTO-ENTMCNC: 33 G/DL (ref 31–37)
MCV RBC AUTO: 85 FL (ref 79–100)
MONO #: 0.6 X10^3/UL (ref 0–1.1)
MONOCYTES NFR BLD: 7 % (ref 0–9)
NEUT #: 6.8 X10^3/UL (ref 1.8–7.7)
NEUTROPHILS NFR BLD AUTO: 77 % (ref 31–73)
PLATELET # BLD AUTO: 452 X10^3/UL (ref 140–400)
POTASSIUM SERPL-SCNC: 3.9 MMOL/L (ref 3.5–5.1)
PROT SERPL-MCNC: 4.4 G/DL (ref 6.4–8.2)
RBC # BLD AUTO: 2.93 X10^6/UL (ref 3.5–5.4)
SODIUM SERPL-SCNC: 137 MMOL/L (ref 136–145)
WBC # BLD AUTO: 8.9 X10^3/UL (ref 4–11)

## 2020-06-19 RX ADMIN — INSULIN LISPRO SCH UNITS: 100 INJECTION, SOLUTION INTRAVENOUS; SUBCUTANEOUS at 01:15

## 2020-06-19 RX ADMIN — IPRATROPIUM BROMIDE AND ALBUTEROL SULFATE SCH ML: .5; 3 SOLUTION RESPIRATORY (INHALATION) at 15:37

## 2020-06-19 RX ADMIN — CIPROFLOXACIN SCH MLS/HR: 2 INJECTION, SOLUTION INTRAVENOUS at 22:08

## 2020-06-19 RX ADMIN — DILTIAZEM HYDROCHLORIDE SCH MLS/HR: 5 INJECTION INTRAVENOUS at 08:35

## 2020-06-19 RX ADMIN — INSULIN LISPRO SCH UNITS: 100 INJECTION, SOLUTION INTRAVENOUS; SUBCUTANEOUS at 06:00

## 2020-06-19 RX ADMIN — FUROSEMIDE ONE MG: 10 INJECTION, SOLUTION INTRAMUSCULAR; INTRAVENOUS at 14:52

## 2020-06-19 RX ADMIN — IPRATROPIUM BROMIDE AND ALBUTEROL SULFATE SCH ML: .5; 3 SOLUTION RESPIRATORY (INHALATION) at 09:33

## 2020-06-19 RX ADMIN — INSULIN LISPRO SCH UNITS: 100 INJECTION, SOLUTION INTRAVENOUS; SUBCUTANEOUS at 12:27

## 2020-06-19 RX ADMIN — CIPROFLOXACIN SCH MLS/HR: 2 INJECTION, SOLUTION INTRAVENOUS at 09:50

## 2020-06-19 RX ADMIN — PANTOPRAZOLE SODIUM SCH MG: 40 INJECTION, POWDER, FOR SOLUTION INTRAVENOUS at 08:34

## 2020-06-19 RX ADMIN — IPRATROPIUM BROMIDE AND ALBUTEROL SULFATE SCH ML: .5; 3 SOLUTION RESPIRATORY (INHALATION) at 04:00

## 2020-06-19 RX ADMIN — IPRATROPIUM BROMIDE AND ALBUTEROL SULFATE SCH ML: .5; 3 SOLUTION RESPIRATORY (INHALATION) at 00:00

## 2020-06-19 RX ADMIN — IPRATROPIUM BROMIDE AND ALBUTEROL SULFATE SCH ML: .5; 3 SOLUTION RESPIRATORY (INHALATION) at 11:59

## 2020-06-19 RX ADMIN — Medication PRN EACH: at 10:54

## 2020-06-19 RX ADMIN — INSULIN LISPRO SCH UNITS: 100 INJECTION, SOLUTION INTRAVENOUS; SUBCUTANEOUS at 18:00

## 2020-06-19 RX ADMIN — DILTIAZEM HYDROCHLORIDE SCH MLS/HR: 5 INJECTION INTRAVENOUS at 22:08

## 2020-06-19 RX ADMIN — PROCHLORPERAZINE EDISYLATE PRN MG: 5 INJECTION INTRAMUSCULAR; INTRAVENOUS at 08:35

## 2020-06-19 RX ADMIN — ACETYLCYSTEINE SCH MG: 200 INHALANT RESPIRATORY (INHALATION) at 09:33

## 2020-06-19 RX ADMIN — DILTIAZEM HYDROCHLORIDE SCH MLS/HR: 5 INJECTION INTRAVENOUS at 11:10

## 2020-06-19 RX ADMIN — FUROSEMIDE ONE MG: 10 INJECTION, SOLUTION INTRAMUSCULAR; INTRAVENOUS at 12:26

## 2020-06-19 RX ADMIN — IPRATROPIUM BROMIDE AND ALBUTEROL SULFATE SCH ML: .5; 3 SOLUTION RESPIRATORY (INHALATION) at 20:10

## 2020-06-19 NOTE — PDOC
PULMONARY PROGRESS NOTES


Subjective


off vent / , no distress


Patient intubated on 3/23 , s/p trach 4/6, 


transferred back to ICU 6/5 for syncope with collapse


developed anemia, blood drainage from RLQ abdomen drain site, and surrounding 

firmness  / developed septic shock 6/7 from abdomen source, required levo 6/7


s/p 3 new drains 6/7 with brown color drainage/ oozing fluid around drains


Vitals





Vital Signs








  Date Time  Temp Pulse Resp B/P (MAP) Pulse Ox O2 Delivery O2 Flow Rate FiO2


 


6/19/20 10:00     98 Tracheal Collar 9.0 


 


6/19/20 09:00  112 20 104/61 (75)    


 


6/19/20 08:00 100.7       





 100.7       








Comments


awake,no soa


General:  No acute distress, Lethargic


HEENT:  Other (nc at perrl   nose clear    neck  trach site ok   no lab  no t

hyromegaly)


Lungs:  Other (diminshed in bases, Rhonci in LLL)


Cardiovascular:  S1, S2


Abdomen:  Soft, Non-tender, Other (bleeding from Sx. site RLQ and firmness)


Extremities:  Other (+1 BLE edema)


Skin:  Warm, Dry


Labs





Laboratory Tests








Test


 6/17/20


11:59 6/17/20


18:34 6/18/20


01:35 6/18/20


06:20


 


Glucose (Fingerstick)


 146 mg/dL


(70-99) 167 mg/dL


(70-99) 159 mg/dL


(70-99) 





 


White Blood Count


 


 


 


 10.0 x10^3/uL


(4.0-11.0)


 


Red Blood Count


 


 


 


 3.06 x10^6/uL


(3.50-5.40)


 


Hemoglobin


 


 


 


 8.7 g/dL


(12.0-15.5)


 


Hematocrit


 


 


 


 26.0 %


(36.0-47.0)


 


Mean Corpuscular Volume    85 fL () 


 


Mean Corpuscular Hemoglobin    28 pg (25-35) 


 


Mean Corpuscular Hemoglobin


Concent 


 


 


 33 g/dL


(31-37)


 


Red Cell Distribution Width


 


 


 


 18.2 %


(11.5-14.5)


 


Platelet Count


 


 


 


 469 x10^3/uL


(140-400)


 


Neutrophils (%) (Auto)    81 % (31-73) 


 


Lymphocytes (%) (Auto)    13 % (24-48) 


 


Monocytes (%) (Auto)    5 % (0-9) 


 


Eosinophils (%) (Auto)    1 % (0-3) 


 


Basophils (%) (Auto)    0 % (0-3) 


 


Neutrophils # (Auto)


 


 


 


 8.1 x10^3/uL


(1.8-7.7)


 


Lymphocytes # (Auto)


 


 


 


 1.3 x10^3/uL


(1.0-4.8)


 


Monocytes # (Auto)


 


 


 


 0.4 x10^3/uL


(0.0-1.1)


 


Eosinophils # (Auto)


 


 


 


 0.1 x10^3/uL


(0.0-0.7)


 


Basophils # (Auto)


 


 


 


 0.0 x10^3/uL


(0.0-0.2)


 


Sodium Level


 


 


 


 137 mmol/L


(136-145)


 


Potassium Level


 


 


 


 3.8 mmol/L


(3.5-5.1)


 


Chloride Level


 


 


 


 106 mmol/L


()


 


Carbon Dioxide Level


 


 


 


 24 mmol/L


(21-32)


 


Anion Gap    7 (6-14) 


 


Blood Urea Nitrogen


 


 


 


 20 mg/dL


(7-20)


 


Creatinine


 


 


 


 0.6 mg/dL


(0.6-1.0)


 


Estimated GFR


(Cockcroft-Gault) 


 


 


 106.3 





 


Glucose Level


 


 


 


 137 mg/dL


(70-99)


 


Calcium Level


 


 


 


 8.8 mg/dL


(8.5-10.1)


 


Magnesium Level


 


 


 


 1.7 mg/dL


(1.8-2.4)


 


Test


 6/18/20


08:04 6/18/20


11:45 6/18/20


17:31 6/19/20


01:26


 


O2 Saturation 98 % (92-99)    


 


Arterial Blood pH


 7.49


(7.35-7.45) 


 


 





 


Arterial Blood pCO2 at


Patient Temp 28 mmHg


(35-46) 


 


 





 


Arterial Blood pO2 at Patient


Temp 116 mmHg


() 


 


 





 


Arterial Blood HCO3


 21 mmol/L


(21-28) 


 


 





 


Arterial Blood Base Excess


 -2 mmol/L


(-3-3) 


 


 





 


FiO2 35%    


 


Glucose (Fingerstick)


 


 148 mg/dL


(70-99) 135 mg/dL


(70-99) 116 mg/dL


(70-99)


 


Test


 6/19/20


06:20 6/19/20


06:30 


 





 


White Blood Count


 8.9 x10^3/uL


(4.0-11.0) 


 


 





 


Red Blood Count


 2.93 x10^6/uL


(3.50-5.40) 


 


 





 


Hemoglobin


 8.2 g/dL


(12.0-15.5) 


 


 





 


Hematocrit


 24.9 %


(36.0-47.0) 


 


 





 


Mean Corpuscular Volume 85 fL ()    


 


Mean Corpuscular Hemoglobin 28 pg (25-35)    


 


Mean Corpuscular Hemoglobin


Concent 33 g/dL


(31-37) 


 


 





 


Red Cell Distribution Width


 18.2 %


(11.5-14.5) 


 


 





 


Platelet Count


 452 x10^3/uL


(140-400) 


 


 





 


Neutrophils (%) (Auto) 77 % (31-73)    


 


Lymphocytes (%) (Auto) 15 % (24-48)    


 


Monocytes (%) (Auto) 7 % (0-9)    


 


Eosinophils (%) (Auto) 1 % (0-3)    


 


Basophils (%) (Auto) 0 % (0-3)    


 


Neutrophils # (Auto)


 6.8 x10^3/uL


(1.8-7.7) 


 


 





 


Lymphocytes # (Auto)


 1.3 x10^3/uL


(1.0-4.8) 


 


 





 


Monocytes # (Auto)


 0.6 x10^3/uL


(0.0-1.1) 


 


 





 


Eosinophils # (Auto)


 0.1 x10^3/uL


(0.0-0.7) 


 


 





 


Basophils # (Auto)


 0.0 x10^3/uL


(0.0-0.2) 


 


 





 


Sodium Level


 137 mmol/L


(136-145) 


 


 





 


Potassium Level


 3.9 mmol/L


(3.5-5.1) 


 


 





 


Chloride Level


 105 mmol/L


() 


 


 





 


Carbon Dioxide Level


 26 mmol/L


(21-32) 


 


 





 


Anion Gap 6 (6-14)    


 


Blood Urea Nitrogen


 18 mg/dL


(7-20) 


 


 





 


Creatinine


 0.6 mg/dL


(0.6-1.0) 


 


 





 


Estimated GFR


(Cockcroft-Gault) 106.3 


 


 


 





 


BUN/Creatinine Ratio 30 (6-20)    


 


Glucose Level


 109 mg/dL


(70-99) 


 


 





 


Calcium Level


 9.3 mg/dL


(8.5-10.1) 


 


 





 


Magnesium Level


 1.9 mg/dL


(1.8-2.4) 


 


 





 


Total Bilirubin


 0.6 mg/dL


(0.2-1.0) 


 


 





 


Aspartate Amino Transf


(AST/SGOT) 17 U/L (15-37) 


 


 


 





 


Alanine Aminotransferase


(ALT/SGPT) 18 U/L (14-59) 


 


 


 





 


Alkaline Phosphatase


 92 U/L


() 


 


 





 


Total Protein


 4.4 g/dL


(6.4-8.2) 


 


 





 


Albumin


 1.2 g/dL


(3.4-5.0) 


 


 





 


Albumin/Globulin Ratio 0.4 (1.0-1.7)    


 


Glucose (Fingerstick)


 


 102 mg/dL


(70-99) 


 











Laboratory Tests








Test


 6/18/20


11:45 6/18/20


17:31 6/19/20


01:26 6/19/20


06:20


 


Glucose (Fingerstick)


 148 mg/dL


(70-99) 135 mg/dL


(70-99) 116 mg/dL


(70-99) 





 


White Blood Count


 


 


 


 8.9 x10^3/uL


(4.0-11.0)


 


Red Blood Count


 


 


 


 2.93 x10^6/uL


(3.50-5.40)


 


Hemoglobin


 


 


 


 8.2 g/dL


(12.0-15.5)


 


Hematocrit


 


 


 


 24.9 %


(36.0-47.0)


 


Mean Corpuscular Volume    85 fL () 


 


Mean Corpuscular Hemoglobin    28 pg (25-35) 


 


Mean Corpuscular Hemoglobin


Concent 


 


 


 33 g/dL


(31-37)


 


Red Cell Distribution Width


 


 


 


 18.2 %


(11.5-14.5)


 


Platelet Count


 


 


 


 452 x10^3/uL


(140-400)


 


Neutrophils (%) (Auto)    77 % (31-73) 


 


Lymphocytes (%) (Auto)    15 % (24-48) 


 


Monocytes (%) (Auto)    7 % (0-9) 


 


Eosinophils (%) (Auto)    1 % (0-3) 


 


Basophils (%) (Auto)    0 % (0-3) 


 


Neutrophils # (Auto)


 


 


 


 6.8 x10^3/uL


(1.8-7.7)


 


Lymphocytes # (Auto)


 


 


 


 1.3 x10^3/uL


(1.0-4.8)


 


Monocytes # (Auto)


 


 


 


 0.6 x10^3/uL


(0.0-1.1)


 


Eosinophils # (Auto)


 


 


 


 0.1 x10^3/uL


(0.0-0.7)


 


Basophils # (Auto)


 


 


 


 0.0 x10^3/uL


(0.0-0.2)


 


Sodium Level


 


 


 


 137 mmol/L


(136-145)


 


Potassium Level


 


 


 


 3.9 mmol/L


(3.5-5.1)


 


Chloride Level


 


 


 


 105 mmol/L


()


 


Carbon Dioxide Level


 


 


 


 26 mmol/L


(21-32)


 


Anion Gap    6 (6-14) 


 


Blood Urea Nitrogen


 


 


 


 18 mg/dL


(7-20)


 


Creatinine


 


 


 


 0.6 mg/dL


(0.6-1.0)


 


Estimated GFR


(Cockcroft-Gault) 


 


 


 106.3 





 


BUN/Creatinine Ratio    30 (6-20) 


 


Glucose Level


 


 


 


 109 mg/dL


(70-99)


 


Calcium Level


 


 


 


 9.3 mg/dL


(8.5-10.1)


 


Magnesium Level


 


 


 


 1.9 mg/dL


(1.8-2.4)


 


Total Bilirubin


 


 


 


 0.6 mg/dL


(0.2-1.0)


 


Aspartate Amino Transf


(AST/SGOT) 


 


 


 17 U/L (15-37) 





 


Alanine Aminotransferase


(ALT/SGPT) 


 


 


 18 U/L (14-59) 





 


Alkaline Phosphatase


 


 


 


 92 U/L


()


 


Total Protein


 


 


 


 4.4 g/dL


(6.4-8.2)


 


Albumin


 


 


 


 1.2 g/dL


(3.4-5.0)


 


Albumin/Globulin Ratio    0.4 (1.0-1.7) 


 


Test


 6/19/20


06:30 


 


 





 


Glucose (Fingerstick)


 102 mg/dL


(70-99) 


 


 











Medications





Active Scripts








 Medications  Dose


 Route/Sig


 Max Daily Dose Days Date Category


 


 Bisoprolol


 Fumarate 5 Mg


 Tablet  10 Mg


 PO DAILY


   3/16/20 Reported








Comments


CXR 6/1k


 


improved right effusion/ atelectasis














ct abdomen /pelvis 6/6


1. Removal of the percutaneous pigtail drainage catheters since the prior 


exam. Sequela of pancreatitis with extensive pseudocysts again 


demonstrated, the right-sided collections are slightly larger since the 


prior exam, the left-sided collections are stable. See above.


2. Moderate to large left pleural effusion with atelectasis and collapse 


of most of the left lower lobe, stable. Small right pleural effusion is 


stable.


3. Gallstone.





ct chest 6/15 reviewed





Impression


.


IMPRESSION:


1.  Acute hypoxemic respiratory failure secondary to ARDS status post trach, 

developed anemia 6/7, blood drainage from RLQ abdomen drain site, and 

surrounding firmness  / developed septic shock 6/7 from abdomen source, required

levo 6/7


s/p 3 new drains 6/7 with brown color drainage, back on vent 6/13, on/off vent 


2.  Gallstone pancreatitis, now with ongoing bleeding from prior drain. Anemic. 

s/p Tx multiple units over several days


3.  septic shock/sepsis, recurrent 6/7, source abdomen. , off levo now, 


4.  Acute kidney injury-, Off HD--renal function decling. suspect JED on CKD due

to hypotension , improved now


5.  Acute gallstone pancreatitis.


6.  Hypoalbuminemia.


7.  Moderate persistent effusions, s/p left thora  5/12, reaccumulation of left 

effusion. O2 requirement not changed. 


8.  Fever- ,hypotension. suspect recurrent sepsis/ likely pancreatic source.  

Per ID, per surgery--


9.  Chronic anemia-- ongoing / s/p PRBC


10. Covid 19 testing negative


11. Moderate to large ascites-S/P paracentisis


12.S/P paracentisis with 4 liters removed on 4/15/20


13. S/P IR drain placement on 5/8/2020, removal, re inserted 6/7


14. Depression/Anxiety 


15. Increase left effusion, ? loculated/ s/p left chest tube.. drainage slowing 

down. 150 cc /24 hr





Plan


.


1. TS 24 hrs  titrate fio2 to keep sat 94%, 


2. Follow all cultures/ PSA from pelvic drains. Abx per ID/  thoracentesis 

result transudate, await c/s,


3. Follow surgery recs-- Acute pancreatitis with persistent necrosis ---- 4/27 

status post ROBERT drain placement + C paropsilosis; 5/6 + yeast & high amylase; s/p

additional drains on 5/8, removal of drains and now increase pseudocyst and 

increase fluid collection. likely infected fluid/ sepsis. on BS abx.follow 

surgery rec, planning surgical intervention next few weeks


4. Follow ID recs for ABX----


5. Follow nephrology recs -----


6. Continue TPN 


7. monitor HGB,  4 units since 6/6--monitor HGB transfuse if less than 8.5 


8 s/p  thoracentesis, 5/12, 3 litres removed, 6/15. 800 cc so far. Monitor chest

tube


9. Pt remains critically ill from severe necrotic pancreatis. Even with surgery 

prognosis grim. Surgery informed family.


10. lasix/albumin today





     


DVT/GI PPX: protonix--- holding lovenox 2/2 anemia


PT/OT


D/W RN, rt and


d/w ID


critically ill    cc time 30 min no overlap











SHARYN SOLORZANO MD                 Jun 19, 2020 10:47

## 2020-06-19 NOTE — NUR
SS following up with discharge planning. SS reviewed pt chart and discussed with pt RN. Per 
RN, pt had some fevers yesterday and blood cultures pending. Pt on TPN, IV Meropenem, 
Micafungin, and Ciprofloxacin. Pt has chest tube and drains x3. Pt has trach shield during 
the day and vent at night. Pt remains Full Code. SS will continue to follow for discharge 
planning.

## 2020-06-19 NOTE — PDOC
Infectious Disease Note


Subjective


Subjective


Doing ok. A little nausea at times. pain ok





Vital Sign


Vital Signs





Vital Signs








  Date Time  Temp Pulse Resp B/P (MAP) Pulse Ox O2 Delivery O2 Flow Rate FiO2


 


6/19/20 06:00  109 20 96/62 (73) 100 Ventilator  


 


6/19/20 04:00 98.3       





 98.3       


 


6/18/20 20:04       9.0 











Physical Exam


PHYSICAL EXAM





GENERAL: More Alert and looks comfortable


HEENT: NGT in place. Oral mucosa dry


NECK:  Tracheostomy 


LUNGS: Diminished aeration bases, no accessory muscle use - CT on left with 

clear fluid


HEART:  S1, S2, tachy, regular 


ABDOMEN: Mild distention, bowel sounds present, soft, grimaces to palpation 


Right lateral side drainage bag, 3 drains with bloodstained drainage.  Left side

with drainage from previous drain site - dressed - clean and dry


: [place] ( 6/ 7)


EXTREMITIES: Trace edema .no  cyanosis. Mild erythema on RUE - better


SKIN: no signs of gen rash 


CNS: Alert and responsive


LUE-PICC (5/29) without signs of complications - previous art line site without 

complications





Labs


Lab





Laboratory Tests








Test


 6/18/20


08:04 6/18/20


11:45 6/18/20


17:31 6/19/20


01:26


 


O2 Saturation 98 % (92-99)    


 


Arterial Blood pH


 7.49


(7.35-7.45) 


 


 





 


Arterial Blood pCO2 at


Patient Temp 28 mmHg


(35-46) 


 


 





 


Arterial Blood pO2 at Patient


Temp 116 mmHg


() 


 


 





 


Arterial Blood HCO3


 21 mmol/L


(21-28) 


 


 





 


Arterial Blood Base Excess


 -2 mmol/L


(-3-3) 


 


 





 


FiO2 35%    


 


Glucose (Fingerstick)


 


 148 mg/dL


(70-99) 135 mg/dL


(70-99) 116 mg/dL


(70-99)


 


Test


 6/19/20


06:20 6/19/20


06:30 


 





 


White Blood Count


 8.9 x10^3/uL


(4.0-11.0) 


 


 





 


Red Blood Count


 2.93 x10^6/uL


(3.50-5.40) 


 


 





 


Hemoglobin


 8.2 g/dL


(12.0-15.5) 


 


 





 


Hematocrit


 24.9 %


(36.0-47.0) 


 


 





 


Mean Corpuscular Volume 85 fL ()    


 


Mean Corpuscular Hemoglobin 28 pg (25-35)    


 


Mean Corpuscular Hemoglobin


Concent 33 g/dL


(31-37) 


 


 





 


Red Cell Distribution Width


 18.2 %


(11.5-14.5) 


 


 





 


Platelet Count


 452 x10^3/uL


(140-400) 


 


 





 


Neutrophils (%) (Auto) 77 % (31-73)    


 


Lymphocytes (%) (Auto) 15 % (24-48)    


 


Monocytes (%) (Auto) 7 % (0-9)    


 


Eosinophils (%) (Auto) 1 % (0-3)    


 


Basophils (%) (Auto) 0 % (0-3)    


 


Neutrophils # (Auto)


 6.8 x10^3/uL


(1.8-7.7) 


 


 





 


Lymphocytes # (Auto)


 1.3 x10^3/uL


(1.0-4.8) 


 


 





 


Monocytes # (Auto)


 0.6 x10^3/uL


(0.0-1.1) 


 


 





 


Eosinophils # (Auto)


 0.1 x10^3/uL


(0.0-0.7) 


 


 





 


Basophils # (Auto)


 0.0 x10^3/uL


(0.0-0.2) 


 


 





 


Glucose (Fingerstick)


 


 102 mg/dL


(70-99) 


 











Micro


6/7 





GRAM STAIN  Final  


        Final





        GRAM NEGATIVE RODS:MODERATE


        SQUAMOUS EPI CELL:NOT APPLICABLE


        PMN (WBCs):RARE


        YEAST:MODERATE


        Unless otherwise specified, Testing Performed by:


        [address] [address]


        For Inquires, the Physician may contact the Microbiology


        department at [phone]





  ANAEROBIC-AEROBIC CULTURE  Preliminary  


        Preliminary





        MANY GRAM NEGATIVE RODS on 06/09/20 at 1158


        FINAL ID= [PSEUDOMONAS AERUGINOSA]


        PSEUDOMONAS AERUGINOSA





  ANTIMICROBIAL SUSCEPTIBILITY  Preliminary  


        Comment





        NEG ANSON 56


        PSEUDOMONAS AERUGINOSA


        ANTIBIOTIC                        RESULT          INTERPRETATION


        AMIKACIN                          <=16                  S


        AZTREONAM                         >16                   R


        CEFTAZIDIME                       >16                   R


        CIPROFLOXACIN                     <=0.25                S


        CEFEPIME                          16                    I


        CEFTAZIDIME/AVIBACTAM             <=4                   S


        GENTAMICIN                        <=2                   S














                               ** CONTINUED ON NEXT PAGE **





-------------------------------------------------------

-------------------------------------





RUN DATE: 06/11/20                  [facility] *LIVE*               

  PAGE 2   


RUN TIME: 1016                            Specimen Inquiry                    


---------------------------------------------

-----------------------------------------------





SPEC: [id number]    PATIENT: JESENIA BEAN                [id number]  (TANIKA perez)


 

--------------------------------------------------------------------------------


------------








-----------------------

---------------------------------------------------------------------





  Procedure                         Result                                      

         


 

--------------------------------------------------------------------------------


------------





  ANTIMICROBIAL SUSCEPTIBILITY  Preliminary   (continued)


        LEVOFLOXACIN                      <=0.5                 S


        MEROPENEM                         <=1                   S


        PIPERACILLIN/TAZOBACTAM           64                    S


        TOBRAMYCIN                        <=2                   S


        Unless otherwise specified, Testing Performed by:


        [address] [place] [address]


        For Inquires, the Physician may contact the Microbiology


        department at [phone]











CT Scan 6/6





IMPRESSION:


1. Removal of the percutaneous pigtail drainage catheters since the prior 


exam. Sequela of pancreatitis with extensive pseudocysts again 


demonstrated, the right-sided collections are slightly larger since the 


prior exam, the left-sided collections are stable. See above.


2. Moderate to large left pleural effusion with atelectasis and collapse 


of most of the left lower lobe, stable. Small right pleural effusion is 


stable.


3. Gallstone.





Objective


Assessment


Patient with prolonged hospitalization


Multiple medical problems


Multiple surgical procedures





Vomited overnight copious amounts


Fever 6/18 WBC nml - Added cipro 6/18 with h/p res PSA in sputum maybe sec to 

ileus given her vomiting. Art- line placed 6/7 - d/c'd 6/18


S/p CT on left 6/15 890 ml overnight


6/7 fluid cult PSAE (MDRO),yeast moderate s/p drain times three R Cefepime, 

Zosyn ANSON < 64 - getting Tpa to some


CXR with Left lung white-out 6/13 ? effusion PSA 6/15 in sputum I Meropenem


Acute hypoxic resp failure on 35 % and 5 PEEP - stable


Transaminitis - mild


Fever improving


Acute pancreatitis with persistent  necrosis


CT a/p 4/9


    Increased ascites. Persistent evidence of necrotizing pancreatitis with 

fluid and phlegmon


   at the pancreas


   4/27 status post ROBERT drain placement; yeast


   5/6 fluid  candida parapsilosis fluid amylase high


CT 6/7


IMPRESSION:


1.   Sequela of pancreatitis with extensive pseudocysts again 


demonstrated, the right-sided collections are slightly larger since the 


prior exam, the left-sided collections are stable. 








Cholelithiasis with thickening of the gallbladder wall.


Leucocytosis - better


JED,Hyperkalemia, Metabolic acidosis off dialysis


Acute hypoxic resp failure ,bilateral pleural effusion and atelectasis


hypocalcemia 


Prediabetes


HTN


s/p trach





S/p thoracenteis 5/12





Plan


Plan of Care














Fluid balance per primary -


Sputum from 6/13 - growing PSA - may be colonization I to Meropenem and 

clinically stable from resp standpoint.





Continue meropenem, has MDRP PSAE June 7 from abd


cont micafungin June 7


Follow-up cultures fluid for yeast


IR eval abd drains


Monitor a.m. labs - CMP/CBC


General surgery following


Monitor drain output


Maintain aspiration precaution


Supportive care





 SID micro [phone]








Critically ill








D/w nursing











RASHAWN ROSEN MD              Jun 19, 2020 07:15

## 2020-06-19 NOTE — PDOC2
KWASI LEAVITT APRN 6/19/20 1501:


CARDIAC CONSULT


DATE OF CONSULT


Date of Consult


DATE: 6/19/20 


TIME: 14:16





REASON FOR CONSULT


Reason for Consult:


Tachycardia, possible CHF





REFERRING PHYSICIAN


Referring Physician:


Fullbright





SOURCE


Source:  Chart review, Patient





HISTORY OF PRESENT ILLNESS


HISTORY OF PRESENT ILLNESS


This is a 41 yo female admitted initially for complains of abdominal pain. She 

was admitted in 3/16/2020 and has been here since. She has had multiorgan 

failure notable for JED warranting HD and respiratory failure eventually 

warranting trach placement associated with severe gallstone pancreatitis with 

necrosis with laparoscopic exploration. She is anasarcic and also has had 

multiple thoracentesis and currently has chest tube with still good amount of 

output and also has had paracentesis. She has had severe anemia with multiple 

transfusion.  Presently she is sitting up and denies any chest pain or SOA. NGT 

in place, Trach via trach shield and has chest tube.  She has good UOP. Consult 

is for tachycardia and CHF. Her renal function is adequate with good UOP. BP is 

low marginal but stable. Sinus tach but no significant arrhythmias.





PAST MEDICAL HISTORY


Cardiovascular:  HTN, Hyperlipidemia


GI:  GERD


Renal/:  UTI


Endocrine:  Diabetes





PAST SURGICAL HISTORY


Past Surgical History:  Other (recent abdominal surgery)





FAMILY HISTORY


Family History


noncontributory to CV





SOCIAL HISTORY


Smoke:  No


ALCOHOL:  none


Drugs:  None





CURRENT MEDICATIONS


CURRENT MEDICATIONS





Current Medications








 Medications


  (Trade)  Dose


 Ordered  Sig/Yee


 Route


 PRN Reason  Start Time


 Stop Time Status Last Admin


Dose Admin


 


 Potassium Acetate


 60 meq/Magnesium


 Sulfate 14 meq/


 Multivitamins 10


 ml/Chromium/


 Copper/Manganese/


 Seleni/Zn 1 ml/


 Insulin Human


 Regular 20 unit/


 Total Parenteral


 Nutrition/Amino


 Acids/Dextrose/


 Fat Emulsion


 Intravenous  1,920 ml @ 


 80 mls/hr  TPN  CONT


 IV


   6/18/20 22:00


 6/19/20 21:59  6/18/20 22:26





 


 Ciprofloxacin/


 Dextrose  200 ml @ 


 200 mls/hr  Q12HR


 IV


   6/18/20 21:00


    6/19/20 09:50





 


 Albumin Human  250 ml @ 


 62.5 mls/hr  1X  ONCE


 IV


   6/19/20 11:00


 6/19/20 14:59  6/19/20 11:09














ALLERGIES


ALLERGIES:  


Coded Allergies:  


     codeine (Verified  Allergy, Intermediate, rash, 3/16/20)





ROS


Review of System


limited, nonverbal





PHYSICAL EXAM


General:  Alert, Cooperative, No acute distress


HEENT:  Atraumatic, Mucous membr. moist/pink


Lungs:  Other (diminished bases, trach with trach shield, chest tube in place 

with serous fluid)


Heart:  Regular rate (SR/ST), Other (distant heart sounds)


Abdomen:  Soft, Other (NGT in place, anasarcic)


Extremities:  No cyanosis, Other (3+ bilateral LE pitting edema)


Skin:  No rashes, No significant lesion


Neuro:  Other (nonverbal due to trach)


Psych/Mental Status:  Other (flat affect)


MUSCULOSKELETAL:  No joint tenderness





VITALS/I&O


VITALS/I&O:





                                   Vital Signs








  Date Time  Temp Pulse Resp B/P (MAP) Pulse Ox O2 Delivery O2 Flow Rate FiO2


 


6/19/20 12:29   22  98 Tracheal Collar  


 


6/19/20 12:00 98.3 111  95/49 (64)    





 98.3       


 


6/19/20 12:00       9.0 














                                    I & O   


 


 6/18/20 6/18/20 6/19/20





 15:00 23:00 07:00


 


Intake Total 846.85 ml 2254.43 ml 570 ml


 


Output Total 535 ml 690 ml 1065 ml


 


Balance 311.85 ml 1564.43 ml -495 ml











LABS


Lab:





                                Laboratory Tests








Test


 6/18/20


17:31 6/19/20


01:26 6/19/20


06:20 6/19/20


06:30


 


Glucose (Fingerstick)


 135 mg/dL


(70-99)  H 116 mg/dL


(70-99)  H 


 102 mg/dL


(70-99)  H


 


White Blood Count


 


 


 8.9 x10^3/uL


(4.0-11.0) 





 


Red Blood Count


 


 


 2.94 x10^6/uL


(3.50-5.70)  L 





 


Hemoglobin


 


 


 8.2 g/dL


(12.0-15.5)  L 





 


Hematocrit


 


 


 24.9 %


(36.0-47.0)  L 





 


Mean Corpuscular Volume


 


 


 85 fL ()


 





 


Mean Corpuscular Hemoglobin   28 pg (25-35)   


 


Mean Corpuscular Hemoglobin


Concent 


 


 33 g/dL


(31-37) 





 


Red Cell Distribution Width


 


 


 18.2 %


(11.5-14.5)  H 





 


Platelet Count


 


 


 452 x10^3/uL


(140-400)  H 





 


Neutrophils (%) (Auto)   77 % (31-73)  H 


 


Lymphocytes (%) (Auto)   15 % (24-48)  L 


 


Monocytes (%) (Auto)   7 % (0-9)   


 


Eosinophils (%) (Auto)   1 % (0-3)   


 


Basophils (%) (Auto)   0 % (0-3)   


 


Neutrophils # (Auto)


 


 


 6.8 x10^3/uL


(1.8-7.7) 





 


Lymphocytes # (Auto)


 


 


 1.3 x10^3/uL


(1.0-4.8) 





 


Monocytes # (Auto)


 


 


 0.6 x10^3/uL


(0.0-1.1) 





 


Eosinophils # (Auto)


 


 


 0.1 x10^3/uL


(0.0-0.7) 





 


Basophils # (Auto)


 


 


 0.0 x10^3/uL


(0.0-0.2) 





 


Absolute Reticulocyte Count


 


 


 0.120 x10^6/uL


(0.020-0.120) 





 


Percent Reticulocyte Count


 


 


 4.1 %


(0.5-2.3)  H 





 


Immature Reticulocyte Fraction


 


 


 0.26


(0.20-0.60) 





 


Sodium Level


 


 


 137 mmol/L


(136-145) 





 


Potassium Level


 


 


 3.9 mmol/L


(3.5-5.1) 





 


Chloride Level


 


 


 105 mmol/L


() 





 


Carbon Dioxide Level


 


 


 26 mmol/L


(21-32) 





 


Anion Gap   6 (6-14)   


 


Blood Urea Nitrogen


 


 


 18 mg/dL


(7-20) 





 


Creatinine


 


 


 0.6 mg/dL


(0.6-1.0) 





 


Estimated GFR


(Cockcroft-Gault) 


 


 106.3  


 





 


BUN/Creatinine Ratio   30 (6-20)  H 


 


Glucose Level


 


 


 109 mg/dL


(70-99)  H 





 


Calcium Level


 


 


 9.3 mg/dL


(8.5-10.1) 





 


Magnesium Level


 


 


 1.9 mg/dL


(1.8-2.4) 





 


Total Bilirubin


 


 


 0.6 mg/dL


(0.2-1.0) 





 


Aspartate Amino Transferase


(AST) 


 


 17 U/L (15-37)


 





 


Alanine Aminotransferase (ALT)


 


 


 18 U/L (14-59)


 





 


Alkaline Phosphatase


 


 


 92 U/L


() 





 


Total Protein


 


 


 4.4 g/dL


(6.4-8.2)  L 





 


Albumin


 


 


 1.2 g/dL


(3.4-5.0)  L 





 


Albumin/Globulin Ratio


 


 


 0.4 (1.0-1.7)


L 





 


Test


 6/19/20


12:25 


 


 





 


Glucose (Fingerstick)


 185 mg/dL


(70-99)  H 


 


 








                                Laboratory Tests


6/19/20 06:20








                                Laboratory Tests


6/19/20 06:20











ASSESSMENT/PLAN


ASSESSMENT/PLAN


1. Severe acute gallstone pancreatitis with necrosis


2. Acute respiratory failure: presently has trach and chest tube. no SOA


3. Anasarca with severe protein malnutrition


4. Sepsis with MDRO: off pressor. BP low marginal but stable, not requiring 

pressors


5. Acute diastolic CHF: multifactorial as above. No SOA. 


6. Normocytic anemia: Hgb stable at the 8s post multiple transfusion


7. Reactive sinus tachycardia: 100-120 with multiple culprits above


8. Severe JED requiring HD: last dialysis about a month ago





Recommendations


1. Continue current regimen. Lasix PRN, TPN, albumin repletion


2. Lopressor PRN if BP allows for sustained SVTs. 


3. Agree with TTE  Supportive care





DIMITRIS BETANCOURT MD 6/21/20 1621:


CARDIAC CONSULT


ASSESSMENT/PLAN


ASSESSMENT/PLAN


Late entry for 6/19/2020


Pt. seen and examined. Agree with above NP note. 


Supportive care. Thanks











KWASI LEAVITT          Jun 19, 2020 15:01


DIMITRIS BETANCOURT MD       Jun 21, 2020 16:21

## 2020-06-19 NOTE — PDOC
Objective:


Objective:


D/w nurse - drainage around drains and from previous drain site, green emesis 

last night, c/o nausea about every 8 hours - scant amount with NG to suction.


Note plans for hematology consult.


Vital Signs:





                                   Vital Signs








  Date Time  Temp Pulse Resp B/P (MAP) Pulse Ox O2 Delivery O2 Flow Rate FiO2


 


6/19/20 12:29   22  98 Tracheal Collar  


 


6/19/20 12:00 98.3 111  95/49 (64)    





 98.3       


 


6/19/20 12:00       9.0 








Labs:





Laboratory Tests








Test


 6/18/20


17:31 6/19/20


01:26 6/19/20


06:20 6/19/20


06:30


 


Glucose (Fingerstick) 135 mg/dL  116 mg/dL   102 mg/dL 


 


White Blood Count   8.9 x10^3/uL  


 


Red Blood Count   2.93 x10^6/uL  


 


Hemoglobin   8.2 g/dL  


 


Hematocrit   24.9 %  


 


Mean Corpuscular Volume   85 fL  


 


Mean Corpuscular Hemoglobin   28 pg  


 


Mean Corpuscular Hemoglobin


Concent 


 


 33 g/dL 


 





 


Red Cell Distribution Width   18.2 %  


 


Platelet Count   452 x10^3/uL  


 


Neutrophils (%) (Auto)   77 %  


 


Lymphocytes (%) (Auto)   15 %  


 


Monocytes (%) (Auto)   7 %  


 


Eosinophils (%) (Auto)   1 %  


 


Basophils (%) (Auto)   0 %  


 


Neutrophils # (Auto)   6.8 x10^3/uL  


 


Lymphocytes # (Auto)   1.3 x10^3/uL  


 


Monocytes # (Auto)   0.6 x10^3/uL  


 


Eosinophils # (Auto)   0.1 x10^3/uL  


 


Basophils # (Auto)   0.0 x10^3/uL  


 


Sodium Level   137 mmol/L  


 


Potassium Level   3.9 mmol/L  


 


Chloride Level   105 mmol/L  


 


Carbon Dioxide Level   26 mmol/L  


 


Anion Gap   6  


 


Blood Urea Nitrogen   18 mg/dL  


 


Creatinine   0.6 mg/dL  


 


Estimated GFR


(Cockcroft-Gault) 


 


 106.3 


 





 


BUN/Creatinine Ratio   30  


 


Glucose Level   109 mg/dL  


 


Calcium Level   9.3 mg/dL  


 


Magnesium Level   1.9 mg/dL  


 


Total Bilirubin   0.6 mg/dL  


 


Aspartate Amino Transf


(AST/SGOT) 


 


 17 U/L 


 





 


Alanine Aminotransferase


(ALT/SGPT) 


 


 18 U/L 


 





 


Alkaline Phosphatase   92 U/L  


 


Total Protein   4.4 g/dL  


 


Albumin   1.2 g/dL  


 


Albumin/Globulin Ratio   0.4  


 


Test


 6/19/20


12:25 


 


 





 


Glucose (Fingerstick) 185 mg/dL    











PE:





GEN: NAD


NEURO/PSYCH: sleeping, not awakened





A/P:


Complicated pancreatitis





--


Continue support.





Justicifation of Admission Dx:


Justifications for Admission:


Justification of Admission Dx:  Yes











RAGHAVENDRA MURCIA         Jun 19, 2020 13:15

## 2020-06-19 NOTE — PDOC
PROGRESS NOTES


Chief Complaint


Chief Complaint


impression =================











History of Present Illness


50yo F w/ PMHx HTN, prediabetes who presented the emergency room complaints of 

abdominal pain. Patient described off and on 3 days. She states is constant, 

described as a squeezing sensation in a band-like distribution. + nausea, 

vomiting.  She denies any fever or diarrhea.  Patient denies any abdominal 

surgical procedures.  She stateed is worse with movements, car ride.  Pain 

initially was upper abdomen however now pretty much generalized.  Last bowel 

movement was 3/15/2020. Nothing makes her pain better.  Patient denies any 

shortness of breath.  She does state the pain moves into her chest.  Denies any 

headache or visual changes.


Lipase 34231, , , Bilirubin 1.4.


CT abdomen confirms pancreatic inflammation, peripancreatic fluid and 

inflammatory changes around the pancreas consistent with pancreatitis. 

Cholelithiasis and 1.4cm uterine fibroid as well as possible left salpingitis. 

Admitted for further care





impression ============================











Acute hypoxic Respiratory failure required  mechanical ventilation


Tracheostomy


bilateral pleural effusions/pulm edema s/p Throacentesis on 6/15/2020


Severe Acute gallstone pancreatitis (not a surgical candidate at this time) with

necrosis


Acute kidney failure now requiring dialysis


Gallstones (Calculus of gallbladder with acute cholecystitis without 

obstruction)


HTN 


Intractable pain


Intractable nausea


Covid 19 negative. 


Acute on chronic anemia 


EEG: No seizure activityFever  - better currently - intermittent could be from 

underlying pancreatitis blood cults 5/4 - neg so far


? Ileus with vomiting


Abd distention - U/S and CT reviewed s/p 0.4 L of opaque, debris-containing 

ascites was removed 5/6


Acute pancreatitis with persistent necrosis


A large fluid collection in the pancreatic bed has slightly decreased in size, 

described below, the pancreas itself is difficult to  visualize, which could be 

due to necrosis or obscuration of pancreatic  parenchyma from the surrounding 

fluid collection.6/15 


- 4/27 status post ROBERT drain placement + C paropsilosis. s/p additional drains 

5/8


Anemia - S/p PRBCs


Cholelithiasis with thickening of the gallbladder wall.


Leucocytosis improving


JED, hyperkalemia, Metabolic acidosis off dialysis


hypocalcemia 


Prediabetes


HTN


s/p trach


ESRD on HD


Hyperglycemia


Moderate to large left pleural effusion with atelectasis and collapse  of most 

of the left lower lobe, stable








6/18 FEVER 101, BLOOD CULTURE X 2 


6/19  iv cipro added











FEN - 


PPX - SCDs, off lovenox currently, high risk for thrombosis but in light of her 

recent anemia and need for transfusion the risks do not outweigh benefits at 

this time. will continue to evaluate on a daily basis


FULL CODE


Dispo - ICU, critically ill


BACK ON VENT 6/17  vent // no distress


iv albumin 6/19











33 MIN CC TIME





History of Present Illness


History of Present Illness


6/8: IR placed drain on 6/7. 4u PRBC after Hb drop. Hb 8.8 today. Off Levophed 

this morning. T-max 100.3. Much more lethargic today. CXR with left sided 

diffuse infiltrates.


6/9: Tachycardic overnight into the 140s. NGT clamped. On BIPAP currently. 

Drains with serosanguinous discharge. WBC 8, Tmax 99.6F.


6/10: Seen on trach shield in ICU.  Hypertensive and tachycardic. Labs stable. 

blood stained drainage from drains. Afebrile.


6/11: Seen on trach shield in ICU. She is a bit confused, drowsy, but when 

sitting up is conversational and confusion somewhat clears. She is asking for 

more pain medication. Stable drains, still very tachy.Na 147


6/12: Patient vomited overnight. Aspirated. Tried to pull her trach out, she was

told she would die without her trach, she said "I know, I just want to go home".

Hb 7.6. Afebrile, still very tachycardic. 1055ml out of right sided ROBERT drain


6/13: Overnight hypoxic, on BIPAP. CXR with left sided white out lung. 

Significant mucous plug suctioned by RT with improvement in her ABG after 2 sean

rs this morning. Not really active, tired, lethargic. 890ml out of drains past 

24 hours. On vent. D/w daughter bedside.


6/14: Still on vent overnight with copious pulmonary drainage on suctioning. 

Labs stable, UOP stable 1L. Drain output still significant with 1505mL. She has 

shown her phone password [id number] and made it clear she does not want her daughters 

to access her phone at this time.


6:15: Patient quite frail, she has had such a lengthy hospital stay that she is 

certainly depressed and as documented 3 days ago is wanting to go home. Most 

likely patient is also tired of being hospitalized with an end point no where in

sight. Reassurance and encouragement provided during my visit. Plans for 

thoracentesis later in the day 


6/16: No acute events reported overnight, case discussed with nursing staff 

patient in no acute distress, complaints of the abdomimal pain during my visit, 

patient is quite depressed, reassurance has been provided, discussed with 

nursing staff at bedside, replace electrolytes 


6/17 off vent / on TS , no distress








6/19 /2020


Patient seen and examined ICU BED


critically ill 


concerned about her long-term prognosis 


Sputum from 6/13 - growing PSA - may be colonization I to Meropenem add Cipro


Continue meropenem, has MDRP PSAE June 7 from abd


cont micafungin June 7


bleeding from Sx. site RLQ and firmness)


max 1003 











33 MIN CC TIME








Date:  Apr 27, 2020





Pre-Op Diagnosis:


Necrotizing pancreatitis





Post-Op Diagnosis:


same





Procedure Performed:


laparoscopic exploration





Surgeon:


Frandy Flores


Asst:  Dr. Luis Santos





Anesthesia Type:


GETA plus local





Blood Loss:


50





Specimans Obtained:


cultures, debris





Findings:


1000 cc ascites suctioned off, cultures sent, diffuse debris, obliteration of 

surgical planes preventing any meaningful exploration























6/4/2020


Patient seen and examined in the ICU


She remains critically ill is is extremely weak


Chart reviewed


Discussed with RN


We decided to try some Prozac as she does seem depressed


On IV TPN


Still has NG tube








6/3/2020


Patient seen and examined in the ICU


She remains critically ill


We have been trying to hold off on her Ativan for the past 24 hours


She is a little more awake but shaky and agitated and anxious seems depressed


Discussed with RN


Chart reviewed








6/2/2020


Patient seen and examined in the ICU


She is a little more alert today but still quite ill


Appears clammy and pale and depressed/anxious


Discussed with RN


Chart reviewed











6/1/2020


Patient seen and examined in the ICU


She appears extremely ill


She is tachypneic at 35 respirations per minute and tachycardic at 132 bpm


She is extremely encephalopathic and shaky


She appears clammy


Chart reviewed


Discussed with RN


Prognosis extremely guarded at best








5/31/2020


Patient still in ICU


Resting with no apparent distress


Chart reviewed








5/30/2020


Patient seen and examined in the ICU


She is wiping her face with a cough


Discussed with RN


Chart reviewed


We hope to get her out of the ICU later today if possible








5/29/2020


Patient seen and examined in the ICU once again


She is back on NG suction


On IV Zosyn


Has IV TPN


Sedated with Precedex but anxious still


Appears somewhat clammy and pale


Chart reviewed


Discussed with RN


She remains critically ill











 


 


BRIEF OPERATIVE NOTE


Pre-Op Diagnosis


Pancreatitis with pseudocysts, suspected infection


Post-Op Diagnosis


same


Procedure Performed


CT abdominal Drains x 3


Surgeon


Hardy


Anesthesia Type:  Conscious Sedation


Findings


3 abdominal drains, 14F, with turbid pancreatic fluid and necrotic debris in 

each.


Complications


No immediate








5/9: Patient today somewhat restless and having bilious secretions from ET tube,

imaging studies ordered, discussed with consultant. Pretty poor prognosis, 

hopefully is not a fistula, poor surgical candidate. 





5/10: Imaging with no acute events, she seems more stable today compared to 

yesterday. Encouraged as much activity as possible patient at high risk for 

severe depression.





Vitals


Vitals





Vital Signs








  Date Time  Temp Pulse Resp B/P (MAP) Pulse Ox O2 Delivery O2 Flow Rate FiO2


 


6/19/20 10:00     98 Tracheal Collar 9.0 


 


6/19/20 09:00  112 20 104/61 (75)    


 


6/19/20 08:00 100.7       





 100.7       











Physical Exam


Physical Exam





GENERAL: More Alert and looks comfortable


HEENT: NGT in place. Oral mucosa dry


NECK:  Tracheostomy 


LUNGS: Diminished aeration bases, no accessory muscle use - CT on left with 

clear fluid


HEART:  S1, S2, tachy, regular 


ABDOMEN: Mild distention, bowel sounds present, soft, grimaces to palpation 


Right lateral side drainage bag, 3 drains with bloodstained drainage.  Left side

with drainage from previous drain site - dressed - clean and dry


bleeding from Sx. site RLQ and firmness)


: [place] ( 6/ 7)


EXTREMITIES: Trace edema .no  cyanosis. Mild erythema on RUE - better


SKIN: no signs of gen rash 


CNS: Alert and responsive


LUE-PICC (5/29) without signs of complications - previous art line site without 

complications


General:  Oriented X3, Cooperative, No acute distress


Heart:  Regular rate, Normal S1, Normal S2, No murmurs, Gallops


Lungs:  Other (diminshed in bases, Rhonci in LLL)


Abdomen:  Soft, Other (drains in place)


Extremities:  No clubbing, No cyanosis, No edema, Normal pulses, No 

tenderness/swelling


Skin:  Other (warm, dry)





Labs


LABS


SEX: F                    EXAM DT: 06/15/20         ACCESSION#: [id number]


STATUS: ADM IN            ORD. PHYSICIAN: RASHAWN ROSEN MD


REASON: Pleural effusion and f/u abd abscesses


PROCEDURE: CT CHEST ABDOMEN PELVIS WO





EXAM: CT CHEST, ABDOMEN, AND PELVIS WITHOUT CONTRAST


 


INDICATION: Pleural effusion and follow-up abdominal abscess


 


COMPARISON:  CT abdomen and pelvis 6/6/2020 and 5/27/2020.


 


TECHNIQUE: Helical CT imaging performed of the chest, abdomen and pelvis 


without the use of intravenous contrast. Sagittal and coronal reformats 


were obtained. 


 


One or more of the following individualized dose reduction techniques were


utilized for this examination:  


 


1. Automated exposure control


2. Adjustment of the mA and/or kV according to patient size


3. Use of iterative reconstruction technique.


 


 


FINDINGS:


 


CHEST:


 


Thyroid gland and thoracic inlet: Visualized portion of the thyroid gland 


is normal.


 


Heart and great vessels: Heart is normal in size. No pericardial effusion.


Thoracic aorta is normal in caliber. A left PICC terminates at the 


superior cavoatrial junction.


 


Mediastinum and drew: No lymphadenopathy. A tracheostomy tube terminates 


at the level of the clavicular heads. A nasogastric tube terminates in the


gastric fundus.


 


Lungs and pleura: Slightly increased moderate to large left pleural 


effusion, predominantly layering with a small partially loculated 


component anterolaterally, and consolidation or atelectasis of the entire 


left lower lobe. Mild opacities in the left upper lobe. There is a small 


right pleural effusion with mild consolidative opacities, also mildly 


increased from prior exam. 


 


Chest wall and axillae: Bilateral breast implants are noted. No axillary 


lymphadenopathy.


 


Bones: No acute osseous abnormality.


 


 


ABDOMEN AND PELVIS:


 


Liver: Liver is normal in size. A 1.2 cm hypodensity in the medial left 


hepatic lobe is unchanged.


 


Gallbladder/Biliary Tree: Cholelithiasis is unchanged. No biliary duct 


dilatation.


 


Pancreas: A large fluid collection in the pancreatic bed has slightly 


decreased in size, described below, the pancreas itself is difficult to 


visualize, which could be due to necrosis or obscuration of pancreatic 


parenchyma from the surrounding fluid collection. Evaluation is also 


limited due to lack of intravenous contrast. There is no gas within the 


pancreatic bed.


 


Spleen: Grossly normal.


 


Adrenal Glands: Normal.


 


Kidneys/Ureters/Bladder: There is unchanged mild right hydronephrosis. No 


urolithiasis. Ureters not well visualized due to fluid collections. The 


left kidney is normal. Bladder is decompressed around a [place] catheter.


 


Reproductive Organs: Uterus is anteverted. No adnexal mass. 


 


Stomach, small bowel, and colon: Nasogastric tube terminates in the 


gastric fundus. Stomach, small bowel, and colon are not well evaluated due


to lack of IV and oral contrast and presence of multiple adjacent fluid 


collections. There is no evidence of bowel obstruction.


 


Vasculature: Abdominal aorta is normal in caliber.


 


Lymph Nodes: No lymphadenopathy.


 


Peritoneum and retroperitoneum: There are multiple large fluid collections


throughout the abdomen and pelvis, some of which are likely communicating 


with each other. The fluid collection in the pancreatic bed involving the 


lesser sac is slightly smaller, now measuring approximately 12.7 x 4.5 cm 


at the level of the lesser sac (image 38, series 4), previously 13.4 x 5.5


cm. There is a new pigtail catheter in this fluid collection.


 


This fluid collection likely communicates with a fluid collection 


involving the right retroperitoneal space and right psoas muscle. This 


fluid collection measures approximately 10.1 x 9.4 cm, previously 10.3 x 


9.7 cm. There is a new pigtail catheter in this collection.


 


A fluid collection in the left paracolic gutter involving the left psoas 


muscle has slightly decreased in size, this measures 10.4 x 6.7 cm at the 


level of the inferior left renal pole, previously 11.7 x 6.7 cm. There is 


a new pleural catheter in this collection.


 


There is a suspected additional fluid collection in the lower midpelvis 


between loops of bowel and abutting the bladder and uterus, measuring 


approximately 10.0 x 4.0 cm, unchanged. This could be confirmed by oral 


contrast to differentiate from a loop of bowel.


 


No definite new fluid collection. There is a persistent small amount of 


free fluid in the pelvis. 


No free air.


 


Bones: No acute osseous abnormality.


 


Miscellaneous: Moderate new anasarca.


 


 


IMPRESSION:


 


1.  Sequela of pancreatitis with extensive pseudocyst/fluid collections 


throughout the abdomen and pelvis including a large peripancreatic fluid 


collection and bilateral retroperitoneal collections involving the psoas 


muscles. Fluid collections are unchanged to minimally decreased from CT 


6/6/2020. There are 3 new pigtail drainage catheters in place. IV or oral 


contrast may be useful to better evaluate, if possible.


 


2. Slightly increased moderate to large partially loculated left pleural 


effusion with atelectasis or consolidation of the left lower lobe. 


Slightly increased small right pleural effusion and mild right pulmonary 


opacities.


 


3. Mild right hydronephrosis.


 


4. Cholelithiasis.


 


5. New anasarca.


 


Electronically signed by: Annette Bone MD (6/15/2020 10:37 AM) 


[id number]














DICTATED and SIGNED BY:     ANNETTE BONE MD


DATE:     06/15/20 1037





Laboratory Tests








Test


 6/18/20


11:45 6/18/20


17:31 6/19/20


01:26 6/19/20


06:20


 


Glucose (Fingerstick)


 148 mg/dL


(70-99) 135 mg/dL


(70-99) 116 mg/dL


(70-99) 





 


White Blood Count


 


 


 


 8.9 x10^3/uL


(4.0-11.0)


 


Red Blood Count


 


 


 


 2.93 x10^6/uL


(3.50-5.40)


 


Hemoglobin


 


 


 


 8.2 g/dL


(12.0-15.5)


 


Hematocrit


 


 


 


 24.9 %


(36.0-47.0)


 


Mean Corpuscular Volume    85 fL () 


 


Mean Corpuscular Hemoglobin    28 pg (25-35) 


 


Mean Corpuscular Hemoglobin


Concent 


 


 


 33 g/dL


(31-37)


 


Red Cell Distribution Width


 


 


 


 18.2 %


(11.5-14.5)


 


Platelet Count


 


 


 


 452 x10^3/uL


(140-400)


 


Neutrophils (%) (Auto)    77 % (31-73) 


 


Lymphocytes (%) (Auto)    15 % (24-48) 


 


Monocytes (%) (Auto)    7 % (0-9) 


 


Eosinophils (%) (Auto)    1 % (0-3) 


 


Basophils (%) (Auto)    0 % (0-3) 


 


Neutrophils # (Auto)


 


 


 


 6.8 x10^3/uL


(1.8-7.7)


 


Lymphocytes # (Auto)


 


 


 


 1.3 x10^3/uL


(1.0-4.8)


 


Monocytes # (Auto)


 


 


 


 0.6 x10^3/uL


(0.0-1.1)


 


Eosinophils # (Auto)


 


 


 


 0.1 x10^3/uL


(0.0-0.7)


 


Basophils # (Auto)


 


 


 


 0.0 x10^3/uL


(0.0-0.2)


 


Sodium Level


 


 


 


 137 mmol/L


(136-145)


 


Potassium Level


 


 


 


 3.9 mmol/L


(3.5-5.1)


 


Chloride Level


 


 


 


 105 mmol/L


()


 


Carbon Dioxide Level


 


 


 


 26 mmol/L


(21-32)


 


Anion Gap    6 (6-14) 


 


Blood Urea Nitrogen


 


 


 


 18 mg/dL


(7-20)


 


Creatinine


 


 


 


 0.6 mg/dL


(0.6-1.0)


 


Estimated GFR


(Cockcroft-Gault) 


 


 


 106.3 





 


BUN/Creatinine Ratio    30 (6-20) 


 


Glucose Level


 


 


 


 109 mg/dL


(70-99)


 


Calcium Level


 


 


 


 9.3 mg/dL


(8.5-10.1)


 


Magnesium Level


 


 


 


 1.9 mg/dL


(1.8-2.4)


 


Total Bilirubin


 


 


 


 0.6 mg/dL


(0.2-1.0)


 


Aspartate Amino Transf


(AST/SGOT) 


 


 


 17 U/L (15-37) 





 


Alanine Aminotransferase


(ALT/SGPT) 


 


 


 18 U/L (14-59) 





 


Alkaline Phosphatase


 


 


 


 92 U/L


()


 


Total Protein


 


 


 


 4.4 g/dL


(6.4-8.2)


 


Albumin


 


 


 


 1.2 g/dL


(3.4-5.0)


 


Albumin/Globulin Ratio    0.4 (1.0-1.7) 


 


Test


 6/19/20


06:30 


 


 





 


Glucose (Fingerstick)


 102 mg/dL


(70-99) 


 


 














Assessment and Plan


Assessmemt and Plan


Problems


Medical Problems:


(1) Acute pancreatitis


Status: Acute  





(2) Cholelithiasis


Status: Acute  











Comment


Review of Relevant


I have reviewed the following items josy (where applicable) has been applied.


Labs





Laboratory Tests








Test


 6/17/20


11:59 6/17/20


18:34 6/18/20


01:35 6/18/20


06:20


 


Glucose (Fingerstick)


 146 mg/dL


(70-99) 167 mg/dL


(70-99) 159 mg/dL


(70-99) 





 


White Blood Count


 


 


 


 10.0 x10^3/uL


(4.0-11.0)


 


Red Blood Count


 


 


 


 3.06 x10^6/uL


(3.50-5.40)


 


Hemoglobin


 


 


 


 8.7 g/dL


(12.0-15.5)


 


Hematocrit


 


 


 


 26.0 %


(36.0-47.0)


 


Mean Corpuscular Volume    85 fL () 


 


Mean Corpuscular Hemoglobin    28 pg (25-35) 


 


Mean Corpuscular Hemoglobin


Concent 


 


 


 33 g/dL


(31-37)


 


Red Cell Distribution Width


 


 


 


 18.2 %


(11.5-14.5)


 


Platelet Count


 


 


 


 469 x10^3/uL


(140-400)


 


Neutrophils (%) (Auto)    81 % (31-73) 


 


Lymphocytes (%) (Auto)    13 % (24-48) 


 


Monocytes (%) (Auto)    5 % (0-9) 


 


Eosinophils (%) (Auto)    1 % (0-3) 


 


Basophils (%) (Auto)    0 % (0-3) 


 


Neutrophils # (Auto)


 


 


 


 8.1 x10^3/uL


(1.8-7.7)


 


Lymphocytes # (Auto)


 


 


 


 1.3 x10^3/uL


(1.0-4.8)


 


Monocytes # (Auto)


 


 


 


 0.4 x10^3/uL


(0.0-1.1)


 


Eosinophils # (Auto)


 


 


 


 0.1 x10^3/uL


(0.0-0.7)


 


Basophils # (Auto)


 


 


 


 0.0 x10^3/uL


(0.0-0.2)


 


Sodium Level


 


 


 


 137 mmol/L


(136-145)


 


Potassium Level


 


 


 


 3.8 mmol/L


(3.5-5.1)


 


Chloride Level


 


 


 


 106 mmol/L


()


 


Carbon Dioxide Level


 


 


 


 24 mmol/L


(21-32)


 


Anion Gap    7 (6-14) 


 


Blood Urea Nitrogen


 


 


 


 20 mg/dL


(7-20)


 


Creatinine


 


 


 


 0.6 mg/dL


(0.6-1.0)


 


Estimated GFR


(Cockcroft-Gault) 


 


 


 106.3 





 


Glucose Level


 


 


 


 137 mg/dL


(70-99)


 


Calcium Level


 


 


 


 8.8 mg/dL


(8.5-10.1)


 


Magnesium Level


 


 


 


 1.7 mg/dL


(1.8-2.4)


 


Test


 6/18/20


08:04 6/18/20


11:45 6/18/20


17:31 6/19/20


01:26


 


O2 Saturation 98 % (92-99)    


 


Arterial Blood pH


 7.49


(7.35-7.45) 


 


 





 


Arterial Blood pCO2 at


Patient Temp 28 mmHg


(35-46) 


 


 





 


Arterial Blood pO2 at Patient


Temp 116 mmHg


() 


 


 





 


Arterial Blood HCO3


 21 mmol/L


(21-28) 


 


 





 


Arterial Blood Base Excess


 -2 mmol/L


(-3-3) 


 


 





 


FiO2 35%    


 


Glucose (Fingerstick)


 


 148 mg/dL


(70-99) 135 mg/dL


(70-99) 116 mg/dL


(70-99)


 


Test


 6/19/20


06:20 6/19/20


06:30 


 





 


White Blood Count


 8.9 x10^3/uL


(4.0-11.0) 


 


 





 


Red Blood Count


 2.93 x10^6/uL


(3.50-5.40) 


 


 





 


Hemoglobin


 8.2 g/dL


(12.0-15.5) 


 


 





 


Hematocrit


 24.9 %


(36.0-47.0) 


 


 





 


Mean Corpuscular Volume 85 fL ()    


 


Mean Corpuscular Hemoglobin 28 pg (25-35)    


 


Mean Corpuscular Hemoglobin


Concent 33 g/dL


(31-37) 


 


 





 


Red Cell Distribution Width


 18.2 %


(11.5-14.5) 


 


 





 


Platelet Count


 452 x10^3/uL


(140-400) 


 


 





 


Neutrophils (%) (Auto) 77 % (31-73)    


 


Lymphocytes (%) (Auto) 15 % (24-48)    


 


Monocytes (%) (Auto) 7 % (0-9)    


 


Eosinophils (%) (Auto) 1 % (0-3)    


 


Basophils (%) (Auto) 0 % (0-3)    


 


Neutrophils # (Auto)


 6.8 x10^3/uL


(1.8-7.7) 


 


 





 


Lymphocytes # (Auto)


 1.3 x10^3/uL


(1.0-4.8) 


 


 





 


Monocytes # (Auto)


 0.6 x10^3/uL


(0.0-1.1) 


 


 





 


Eosinophils # (Auto)


 0.1 x10^3/uL


(0.0-0.7) 


 


 





 


Basophils # (Auto)


 0.0 x10^3/uL


(0.0-0.2) 


 


 





 


Sodium Level


 137 mmol/L


(136-145) 


 


 





 


Potassium Level


 3.9 mmol/L


(3.5-5.1) 


 


 





 


Chloride Level


 105 mmol/L


() 


 


 





 


Carbon Dioxide Level


 26 mmol/L


(21-32) 


 


 





 


Anion Gap 6 (6-14)    


 


Blood Urea Nitrogen


 18 mg/dL


(7-20) 


 


 





 


Creatinine


 0.6 mg/dL


(0.6-1.0) 


 


 





 


Estimated GFR


(Cockcroft-Gault) 106.3 


 


 


 





 


BUN/Creatinine Ratio 30 (6-20)    


 


Glucose Level


 109 mg/dL


(70-99) 


 


 





 


Calcium Level


 9.3 mg/dL


(8.5-10.1) 


 


 





 


Magnesium Level


 1.9 mg/dL


(1.8-2.4) 


 


 





 


Total Bilirubin


 0.6 mg/dL


(0.2-1.0) 


 


 





 


Aspartate Amino Transf


(AST/SGOT) 17 U/L (15-37) 


 


 


 





 


Alanine Aminotransferase


(ALT/SGPT) 18 U/L (14-59) 


 


 


 





 


Alkaline Phosphatase


 92 U/L


() 


 


 





 


Total Protein


 4.4 g/dL


(6.4-8.2) 


 


 





 


Albumin


 1.2 g/dL


(3.4-5.0) 


 


 





 


Albumin/Globulin Ratio 0.4 (1.0-1.7)    


 


Glucose (Fingerstick)


 


 102 mg/dL


(70-99) 


 











Laboratory Tests








Test


 6/18/20


11:45 6/18/20


17:31 6/19/20


01:26 6/19/20


06:20


 


Glucose (Fingerstick)


 148 mg/dL


(70-99) 135 mg/dL


(70-99) 116 mg/dL


(70-99) 





 


White Blood Count


 


 


 


 8.9 x10^3/uL


(4.0-11.0)


 


Red Blood Count


 


 


 


 2.93 x10^6/uL


(3.50-5.40)


 


Hemoglobin


 


 


 


 8.2 g/dL


(12.0-15.5)


 


Hematocrit


 


 


 


 24.9 %


(36.0-47.0)


 


Mean Corpuscular Volume    85 fL () 


 


Mean Corpuscular Hemoglobin    28 pg (25-35) 


 


Mean Corpuscular Hemoglobin


Concent 


 


 


 33 g/dL


(31-37)


 


Red Cell Distribution Width


 


 


 


 18.2 %


(11.5-14.5)


 


Platelet Count


 


 


 


 452 x10^3/uL


(140-400)


 


Neutrophils (%) (Auto)    77 % (31-73) 


 


Lymphocytes (%) (Auto)    15 % (24-48) 


 


Monocytes (%) (Auto)    7 % (0-9) 


 


Eosinophils (%) (Auto)    1 % (0-3) 


 


Basophils (%) (Auto)    0 % (0-3) 


 


Neutrophils # (Auto)


 


 


 


 6.8 x10^3/uL


(1.8-7.7)


 


Lymphocytes # (Auto)


 


 


 


 1.3 x10^3/uL


(1.0-4.8)


 


Monocytes # (Auto)


 


 


 


 0.6 x10^3/uL


(0.0-1.1)


 


Eosinophils # (Auto)


 


 


 


 0.1 x10^3/uL


(0.0-0.7)


 


Basophils # (Auto)


 


 


 


 0.0 x10^3/uL


(0.0-0.2)


 


Sodium Level


 


 


 


 137 mmol/L


(136-145)


 


Potassium Level


 


 


 


 3.9 mmol/L


(3.5-5.1)


 


Chloride Level


 


 


 


 105 mmol/L


()


 


Carbon Dioxide Level


 


 


 


 26 mmol/L


(21-32)


 


Anion Gap    6 (6-14) 


 


Blood Urea Nitrogen


 


 


 


 18 mg/dL


(7-20)


 


Creatinine


 


 


 


 0.6 mg/dL


(0.6-1.0)


 


Estimated GFR


(Cockcroft-Gault) 


 


 


 106.3 





 


BUN/Creatinine Ratio    30 (6-20) 


 


Glucose Level


 


 


 


 109 mg/dL


(70-99)


 


Calcium Level


 


 


 


 9.3 mg/dL


(8.5-10.1)


 


Magnesium Level


 


 


 


 1.9 mg/dL


(1.8-2.4)


 


Total Bilirubin


 


 


 


 0.6 mg/dL


(0.2-1.0)


 


Aspartate Amino Transf


(AST/SGOT) 


 


 


 17 U/L (15-37) 





 


Alanine Aminotransferase


(ALT/SGPT) 


 


 


 18 U/L (14-59) 





 


Alkaline Phosphatase


 


 


 


 92 U/L


()


 


Total Protein


 


 


 


 4.4 g/dL


(6.4-8.2)


 


Albumin


 


 


 


 1.2 g/dL


(3.4-5.0)


 


Albumin/Globulin Ratio    0.4 (1.0-1.7) 


 


Test


 6/19/20


06:30 


 


 





 


Glucose (Fingerstick)


 102 mg/dL


(70-99) 


 


 











Microbiology


6/15/20 Gram Stain - Final, Resulted


          


6/15/20 Aerobic and Anaerobic Culture - Preliminary, Resulted


          


6/13/20 Gram Stain Evaluation - Final, Complete


          


6/13/20 Respiratory Culture - Final, Complete


          


6/13/20 Antimicrobic Susceptibility - Final, Complete


          


6/7/20 Blood Culture - Final, Complete


         NO GROWTH AFTER 5 DAYS


6/7/20 Urine Culture - Final, Complete


         


5/30/20 Gram Stain - Final, Complete


          


5/30/20 Aerobic Culture - Final, Complete


Medications





Current Medications


Sodium Chloride 1,000 ml @  1,000 mls/hr Q1H IV  Last administered on 3/16/20at 

03:00;  Start 3/16/20 at 03:00;  Stop 3/16/20 at 03:59;  Status DC


Ondansetron HCl (Zofran) 4 mg 1X  ONCE IVP  Last administered on 3/16/20at 

03:27;  Start 3/16/20 at 03:00;  Stop 3/16/20 at 03:01;  Status DC


Morphine Sulfate (Morphine Sulfate) 4 mg 1X  ONCE IV ;  Start 3/16/20 at 03:00; 

Stop 3/16/20 at 03:01;  Status Cancel


Ketorolac Tromethamine (Toradol 30mg Vial) 30 mg 1X  ONCE IV  Last administered 

on 3/16/20at 02:54;  Start 3/16/20 at 03:00;  Stop 3/16/20 at 03:01;  Status DC


Fentanyl Citrate (Fentanyl 2ml Vial) 25 mcg 1X  ONCE IVP  Last administered on 

3/16/20at 03:23;  Start 3/16/20 at 03:30;  Stop 3/16/20 at 03:31;  Status DC


Fentanyl Citrate (Fentanyl 2ml Vial) 100 mcg STK-MED ONCE .ROUTE ;  Start 

3/16/20 at 03:18;  Stop 3/16/20 at 03:18;  Status DC


Iohexol (Omnipaque 350 Mg/ml) 90 ml 1X  ONCE IV  Last administered on 3/16/20at 

03:25;  Start 3/16/20 at 03:30;  Stop 3/16/20 at 03:31;  Status DC


Info (CONTRAST GIVEN -- Rx MONITORING) 1 each PRN DAILY  PRN MC SEE COMMENTS;  

Start 3/16/20 at 03:30;  Stop 3/18/20 at 03:29;  Status DC


Hydromorphone HCl (Dilaudid) 0.5 mg 1X  ONCE IV  Last administered on 3/16/20at 

03:55;  Start 3/16/20 at 04:30;  Stop 3/16/20 at 04:32;  Status DC


Ondansetron HCl (Zofran) 4 mg PRN Q8HRS  PRN IV NAUSEA/VOMITING 1ST CHOICE;  

Start 3/16/20 at 05:00;  Stop 3/16/20 at 09:27;  Status DC


Morphine Sulfate (Morphine Sulfate) 2 mg PRN Q2HR  PRN IV SEVERE PAIN 7-10 Last 

administered on 3/17/20at 12:26;  Start 3/16/20 at 05:00;  Stop 3/17/20 at 

14:15;  Status DC


Sodium Chloride 1,000 ml @  125 mls/hr Q8H IV  Last administered on 3/16/20at 

20:56;  Start 3/16/20 at 05:00;  Stop 3/17/20 at 04:59;  Status DC


Hydromorphone HCl (Dilaudid) 0.5 mg PRN Q3HRS  PRN IV SEVERE PAIN 7-10 Last 

administered on 3/17/20at 10:06;  Start 3/16/20 at 05:00;  Stop 3/17/20 at 

12:01;  Status DC


Piperacillin Sod/ Tazobactam Sod 4.5 gm/Sodium Chloride 100 ml @  200 mls/hr 1X 

ONCE IV  Last administered on 3/16/20at 05:44;  Start 3/16/20 at 06:00;  Stop 

3/16/20 at 06:29;  Status DC


Ondansetron HCl (Zofran) 4 mg PRN Q4HRS  PRN IV NAUSEA/VOMITING 1ST CHOICE Last 

administered on 6/17/20at 23:20;  Start 3/16/20 at 09:30


Insulin Human Lispro (HumaLOG) 0-9 UNITS Q6HRS SQ  Last administered on 

6/17/20at 18:36;  Start 3/16/20 at 09:30


Dextrose (Dextrose 50%-Water Syringe) 12.5 gm PRN Q15MIN  PRN IV SEE COMMENTS;  

Start 3/16/20 at 09:30


Pantoprazole Sodium (PROTONIX VIAL for IV PUSH) 40 mg DAILYAC IVP  Last 

administered on 6/19/20at 08:34;  Start 3/16/20 at 11:30


Prochlorperazine Edisylate (Compazine) 10 mg PRN Q6HRS  PRN IV NAUSEA/VOMITING, 

2nd CHOICE Last administered on 6/19/20at 08:35;  Start 3/16/20 at 17:45


Atenolol (Tenormin) 100 mg DAILY PO ;  Start 3/17/20 at 09:00;  Stop 3/16/20 at 

20:08;  Status DC


Metoprolol Tartrate (Lopressor Vial) 2.5 mg Q6HRS IVP  Last administered on 

3/17/20at 05:51;  Start 3/16/20 at 20:15;  Stop 3/17/20 at 10:02;  Status DC


Metoprolol Tartrate (Lopressor Vial) 5 mg Q6HRS IVP  Last administered on 

3/26/20at 00:12;  Start 3/17/20 at 10:15;  Stop 3/28/20 at 08:48;  Status DC


Hydromorphone HCl (Dilaudid) 1 mg PRN Q3HRS  PRN IV SEVERE PAIN 7-10 Last 

administered on 3/23/20at 05:13;  Start 3/17/20 at 12:00;  Stop 3/31/20 at 

00:25;  Status DC


Lidocaine HCl (Buffered Lidocaine 1%) 3 ml STK-MED ONCE .ROUTE ;  Start 3/17/20 

at 12:55;  Stop 3/17/20 at 12:56;  Status DC


Albumin Human 500 ml @  125 mls/hr 1X  ONCE IV  Last administered on 3/17/20at 

14:33;  Start 3/17/20 at 14:30;  Stop 3/17/20 at 18:32;  Status DC


Norepinephrine Bitartrate 8 mg/ Dextrose 258 ml @  17.299 mls/ hr CONT  PRN IV 

PER PROTOCOL Last administered on 4/14/20at 12:48;  Start 3/17/20 at 15:30;  

Stop 4/17/20 at 09:19;  Status DC


Sodium Chloride 1,000 ml @  125 mls/hr Q8H IV  Last administered on 3/17/20at 

21:04;  Start 3/17/20 at 16:00;  Stop 3/18/20 at 02:42;  Status DC


Albumin Human 500 ml @  125 mls/hr PRN BID  PRN IV After every 2L NSS & BP < 

90mm Last administered on 6/6/20at 11:40;  Start 3/17/20 at 16:00


Iohexol (Omnipaque 300 Mg/ml) 60 ml 1X  ONCE IV  Last administered on 3/17/20at 

17:20;  Start 3/17/20 at 17:00;  Stop 3/17/20 at 17:01;  Status DC


Info (CONTRAST GIVEN -- Rx MONITORING) 1 each PRN DAILY  PRN MC SEE COMMENTS;  

Start 3/17/20 at 17:00;  Stop 3/19/20 at 16:59;  Status DC


Meropenem 1 gm/ Sodium Chloride 100 ml @  200 mls/hr Q8HRS IV  Last administered

on 3/18/20at 05:45;  Start 3/17/20 at 20:00;  Stop 3/18/20 at 08:48;  Status DC


Furosemide (Lasix) 40 mg 1X  ONCE IVP  Last administered on 3/17/20at 22:12;  

Start 3/17/20 at 22:30;  Stop 3/17/20 at 22:31;  Status DC


Calcium Chloride 1000 mg/Sodium Chloride 110 ml @  220 mls/hr 1X  ONCE IV  Last 

administered on 3/17/20at 22:11;  Start 3/17/20 at 22:30;  Stop 3/17/20 at 

22:59;  Status DC


Albuterol Sulfate (Ventolin Neb Soln) 2.5 mg 1X  ONCE NEB  Last administered on 

3/18/20at 00:56;  Start 3/17/20 at 22:30;  Stop 3/17/20 at 22:31;  Status DC


Insulin Human Regular (HumuLIN R VIAL) 5 unit 1X  ONCE IV  Last administered on 

3/17/20at 22:14;  Start 3/17/20 at 22:30;  Stop 3/17/20 at 22:31;  Status DC


Magnesium Sulfate 50 ml @ 25 mls/hr 1X  ONCE IV  Last administered on 3/18/20at 

02:57;  Start 3/18/20 at 03:00;  Stop 3/18/20 at 04:59;  Status DC


Calcium Gluconate 1000 mg/Sodium Chloride 110 ml @  220 mls/hr 1X  ONCE IV  Last

administered on 3/18/20at 02:46;  Start 3/18/20 at 03:00;  Stop 3/18/20 at 

03:29;  Status DC


Sodium Chloride 1,000 ml @  200 mls/hr Q5H IV  Last administered on 3/18/20at 

02:46;  Start 3/18/20 at 03:00;  Stop 3/18/20 at 10:21;  Status DC


Calcium Gluconate 1000 mg/Sodium Chloride 110 ml @  220 mls/hr 1X  ONCE IV  Last

administered on 3/18/20at 03:21;  Start 3/18/20 at 03:30;  Stop 3/18/20 at 

03:59;  Status DC


Sodium Bicarbonate 50 meq/Sodium Chloride 1,050 ml @  75 mls/hr Q14H IV  Last a

dministered on 3/22/20at 21:10;  Start 3/18/20 at 07:30;  Stop 3/23/20 at 10:28;

 Status DC


Calcium Gluconate 2000 mg/Sodium Chloride 120 ml @  220 mls/hr 1X  ONCE IV  Last

administered on 3/18/20at 09:05;  Start 3/18/20 at 07:30;  Stop 3/18/20 at 

08:02;  Status DC


Lidocaine HCl (Xylocaine-Mpf 1% 2ml Vial) 2 ml STK-MED ONCE .ROUTE ;  Start 

3/18/20 at 08:47;  Stop 3/18/20 at 08:47;  Status DC


Meropenem 500 mg/ Sodium Chloride 50 ml @  100 mls/hr Q12HR IV  Last 

administered on 3/23/20at 21:01;  Start 3/18/20 at 18:00;  Stop 3/24/20 at 

07:58;  Status DC


Lidocaine HCl (Buffered Lidocaine 1%) 3 ml STK-MED ONCE .ROUTE ;  Start 3/18/20 

at 09:46;  Stop 3/18/20 at 09:46;  Status DC


Lidocaine HCl (Buffered Lidocaine 1%) 6 ml 1X  ONCE INJ  Last administered on 

3/18/20at 10:26;  Start 3/18/20 at 10:15;  Stop 3/18/20 at 10:16;  Status DC


Info (Tpn Per Pharmacy) 1 each PRN DAILY  PRN MC SEE COMMENTS Last administered 

on 6/18/20at 15:01;  Start 3/18/20 at 12:00


Sodium Chloride 1,000 ml @  1,000 mls/hr Q1H PRN IV hypotension;  Start 3/18/20 

at 12:07;  Stop 3/18/20 at 18:06;  Status DC


Diphenhydramine HCl (Benadryl) 25 mg 1X PRN  PRN IV ITCHING;  Start 3/18/20 at 

12:15;  Stop 3/19/20 at 12:14;  Status DC


Diphenhydramine HCl (Benadryl) 25 mg 1X PRN  PRN IV ITCHING;  Start 3/18/20 at 

12:15;  Stop 3/19/20 at 12:14;  Status DC


Sodium Chloride 1,000 ml @  400 mls/hr Q2H30M PRN IV PATENCY;  Start 3/18/20 at 

12:07;  Stop 3/19/20 at 00:06;  Status DC


Info (PHARMACY MONITORING -- do not chart) 1 each PRN DAILY  PRN MC SEE 

COMMENTS;  Start 3/18/20 at 12:15;  Stop 3/20/20 at 08:13;  Status DC


Sodium Chloride 90 meq/Calcium Gluconate 10 meq/ Multivitamins 10 ml/Chromium/ 

Copper/Manganese/ Seleni/Zn 1 ml/ Total Parenteral Nutrition/Amino 

Acids/Dextrose/ Fat Emulsion Intravenous 55.005 ml  @ 2.292 mls/hr TPN  CONT IV 

;  Start 3/18/20 at 22:00;  Stop 3/18/20 at 12:33;  Status DC


Info (Tpn Per Pharmacy) 1 each PRN DAILY  PRN MC SEE COMMENTS;  Start 3/18/20 at

12:30;  Status UNV


Sodium Chloride 90 meq/Calcium Gluconate 10 meq/ Multivitamins 10 ml/Chromium/ 

Copper/Manganese/ Seleni/Zn 0.5 ml/ Total Parenteral Nutrition/Amino 

Acids/Dextrose/ Fat Emulsion Intravenous 1,512 ml @  63 mls/hr TPN  CONT IV  

Last administered on 3/18/20at 22:06;  Start 3/18/20 at 22:00;  Stop 3/19/20 at 

21:59;  Status DC


Calcium Carbonate/ Glycine (Tums) 500 mg PRN AFTMEALHC  PRN PO INDIGESTION;  

Start 3/18/20 at 17:45;  Stop 5/13/20 at 10:25;  Status DC


Calcium Gluconate (Calcium Gluconate) 2,000 mg 1X  ONCE IVP  Last administered 

on 3/19/20at 02:19;  Start 3/19/20 at 02:15;  Stop 3/19/20 at 02:16;  Status DC


Calcium Chloride 3000 mg/Sodium Chloride 1,030 ml @  50 mls/hr [id number] IV  Last 

administered on 3/21/20at 02:17;  Start 3/19/20 at 08:00;  Stop 3/21/20 at 

15:23;  Status DC


Lorazepam (Ativan Inj) 1 mg PRN Q4HRS  PRN IVP ANXIETY / AGITATION, 2nd choic 

Last administered on 4/17/20at 03:51;  Start 3/19/20 at 09:00;  Stop 4/17/20 at 

09:19;  Status DC


Sodium Chloride 1,000 ml @  1,000 mls/hr Q1H PRN IV hypotension;  Start 3/19/20 

at 08:56;  Stop 3/19/20 at 14:55;  Status DC


Albumin Human 200 ml @  200 mls/hr 1X PRN  PRN IV Hypotension;  Start 3/19/20 at

09:00;  Stop 3/19/20 at 14:59;  Status DC


Diphenhydramine HCl (Benadryl) 25 mg 1X PRN  PRN IV ITCHING;  Start 3/19/20 at 

09:00;  Stop 3/20/20 at 08:59;  Status DC


Diphenhydramine HCl (Benadryl) 25 mg 1X PRN  PRN IV ITCHING;  Start 3/19/20 at 

09:00;  Stop 3/20/20 at 08:59;  Status DC


Sodium Chloride 1,000 ml @  400 mls/hr Q2H30M PRN IV PATENCY;  Start 3/19/20 at 

08:56;  Stop 3/19/20 at 20:55;  Status DC


Info (PHARMACY MONITORING -- do not chart) 1 each PRN DAILY  PRN MC SEE 

COMMENTS;  Start 3/19/20 at 09:00;  Status UNV


Info (PHARMACY MONITORING -- do not chart) 1 each PRN DAILY  PRN MC SEE 

COMMENTS;  Start 3/19/20 at 09:00;  Stop 3/20/20 at 08:13;  Status DC


Digoxin (Lanoxin) 500 mcg 1X  ONCE IV  Last administered on 3/19/20at 10:04;  

Start 3/19/20 at 10:00;  Stop 3/19/20 at 10:01;  Status DC


Digoxin (Lanoxin) 125 mcg 1X  ONCE IV  Last administered on 3/19/20at 17:10;  

Start 3/19/20 at 18:00;  Stop 3/19/20 at 18:01;  Status DC


Magnesium Sulfate 100 ml @  25 mls/hr 1X  ONCE IV  Last administered on 

3/19/20at 12:48;  Start 3/19/20 at 13:00;  Stop 3/19/20 at 16:59;  Status DC


Sodium Chloride 90 meq/Magnesium Sulfate 10 meq/ Calcium Gluconate 20 meq/ 

Multivitamins 10 ml/Chromium/ Copper/Manganese/ Seleni/Zn 0.5 ml/ Total 

Parenteral Nutrition/Amino Acids/Dextrose/ Fat Emulsion Intravenous 1,512 ml @  

63 mls/hr TPN  CONT IV  Last administered on 3/19/20at 22:25;  Start 3/19/20 at 

22:00;  Stop 3/20/20 at 21:59;  Status DC


Sodium Chloride 1,000 ml @  1,000 mls/hr Q1H PRN IV hypotension;  Start 3/20/20 

at 08:05;  Stop 3/20/20 at 14:04;  Status DC


Albumin Human 200 ml @  200 mls/hr 1X  ONCE IV  Last administered on 3/20/20at 

08:57;  Start 3/20/20 at 08:15;  Stop 3/20/20 at 09:14;  Status DC


Diphenhydramine HCl (Benadryl) 25 mg 1X PRN  PRN IV ITCHING;  Start 3/20/20 at 

08:15;  Stop 3/21/20 at 08:14;  Status DC


Diphenhydramine HCl (Benadryl) 25 mg 1X PRN  PRN IV ITCHING;  Start 3/20/20 at 

08:15;  Stop 3/21/20 at 08:14;  Status DC


Sodium Chloride 1,000 ml @  400 mls/hr Q2H30M PRN IV PATENCY;  Start 3/20/20 at 

08:05;  Stop 3/20/20 at 20:04;  Status DC


Info (PHARMACY MONITORING -- do not chart) 1 each PRN DAILY  PRN MC SEE 

COMMENTS;  Start 3/20/20 at 08:15;  Stop 3/24/20 at 07:57;  Status DC


Sodium Chloride 90 meq/Potassium Chloride 15 meq/ Potassium Phosphate 10 mmol/ 

Magnesium Sulfate 10 meq/Calcium Gluconate 20 meq/ Multivitamins 10 ml/Chromium/

Copper/Manganese/ Seleni/Zn 0.5 ml/ Total Parenteral Nutrition/Amino 

Acids/Dextrose/ Fat Emulsion Intravenous 1,512 ml @  63 mls/hr TPN  CONT IV  

Last administered on 3/20/20at 21:01;  Start 3/20/20 at 22:00;  Stop 3/21/20 at 

21:59;  Status DC


Potassium Chloride/Water 100 ml @  100 mls/hr 1X  ONCE IV  Last administered on 

3/20/20at 14:09;  Start 3/20/20 at 14:00;  Stop 3/20/20 at 14:59;  Status DC


Benzocaine (Hurricaine One) 1 spray 1X  ONCE MM  Last administered on 3/20/20at 

16:38;  Start 3/20/20 at 14:30;  Stop 3/20/20 at 14:31;  Status DC


Lidocaine HCl (Glydo (Lidocaine) Jelly) 1 thomas 1X  ONCE MM  Last administered on 

3/20/20at 16:38;  Start 3/20/20 at 14:30;  Stop 3/20/20 at 14:31;  Status DC


Linezolid/Dextrose 300 ml @  300 mls/hr Q12HR IV  Last administered on 3/26/20at

21:04;  Start 3/20/20 at 20:00;  Stop 3/27/20 at 07:50;  Status DC


Acetaminophen (Tylenol) 650 mg PRN Q6HRS  PRN PO MILD PAIN / TEMP;  Start 

3/21/20 at 03:30;  Stop 3/21/20 at 03:36;  Status DC


Acetaminophen (Tylenol) 650 mg PRN Q6HRS  PRN PEG MILD PAIN / TEMP Last 

administered on 4/16/20at 19:56;  Start 3/21/20 at 03:36;  Stop 5/13/20 at 

10:25;  Status DC


Sodium Chloride 1,000 ml @  1,000 mls/hr Q1H PRN IV hypotension;  Start 3/21/20 

at 07:50;  Stop 3/21/20 at 13:49;  Status DC


Albumin Human 200 ml @  200 mls/hr 1X PRN  PRN IV Hypotension;  Start 3/21/20 at

08:00;  Stop 3/21/20 at 13:59;  Status DC


Sodium Chloride (Normal Saline Flush) 10 ml 1X PRN  PRN IV AP catheter pack;  

Start 3/21/20 at 08:00;  Stop 3/22/20 at 07:59;  Status DC


Sodium Chloride (Normal Saline Flush) 10 ml 1X PRN  PRN IV  catheter pack;  

Start 3/21/20 at 08:00;  Stop 3/22/20 at 07:59;  Status DC


Sodium Chloride 1,000 ml @  400 mls/hr Q2H30M PRN IV PATENCY;  Start 3/21/20 at 

07:50;  Stop 3/21/20 at 19:49;  Status DC


Info (PHARMACY MONITORING -- do not chart) 1 each PRN DAILY  PRN MC SEE 

COMMENTS;  Start 3/21/20 at 08:00;  Status UNV


Info (PHARMACY MONITORING -- do not chart) 1 each PRN DAILY  PRN MC SEE 

COMMENTS;  Start 3/21/20 at 08:00;  Stop 3/23/20 at 08:25;  Status DC


Sodium Chloride 90 meq/Potassium Chloride 15 meq/ Potassium Phosphate 10 mmol/ 

Magnesium Sulfate 10 meq/Calcium Gluconate 20 meq/ Multivitamins 10 ml/Chromium/

Copper/Manganese/ Seleni/Zn 0.5 ml/ Total Parenteral Nutrition/Amino 

Acids/Dextrose/ Fat Emulsion Intravenous 1,512 ml @  63 mls/hr TPN  CONT IV  

Last administered on 3/21/20at 20:57;  Start 3/21/20 at 22:00;  Stop 3/22/20 at 

21:59;  Status DC


Sodium Chloride 90 meq/Potassium Chloride 15 meq/ Potassium Phosphate 15 mmol/ 

Magnesium Sulfate 10 meq/Calcium Gluconate 20 meq/ Multivitamins 10 ml/Chromium/

Copper/Manganese/ Seleni/Zn 0.5 ml/ Total Parenteral Nutrition/Amino 

Acids/Dextrose/ Fat Emulsion Intravenous 1,512 ml @  63 mls/hr TPN  CONT IV ;  

Start 3/22/20 at 22:00;  Stop 3/22/20 at 14:16;  Status DC


Sodium Chloride 90 meq/Potassium Chloride 15 meq/ Potassium Phosphate 15 mmol/ 

Magnesium Sulfate 10 meq/Calcium Gluconate 20 meq/ Multivitamins 10 ml/Chromium/

Copper/Manganese/ Seleni/Zn 0.5 ml/ Total Parenteral Nutrition/Amino 

Acids/Dextrose/ Fat Emulsion Intravenous 1,200 ml @  50 mls/hr TPN  CONT IV ;  

Start 3/22/20 at 22:00;  Stop 3/22/20 at 14:17;  Status DC


Sodium Chloride 90 meq/Potassium Chloride 15 meq/ Potassium Phosphate 10 mmol/ 

Magnesium Sulfate 10 meq/Calcium Gluconate 20 meq/ Multivitamins 10 ml/Chromium/

Copper/Manganese/ Seleni/Zn 0.5 ml/ Total Parenteral Nutrition/Amino 

Acids/Dextrose/ Fat Emulsion Intravenous 1,200 ml @  50 mls/hr TPN  CONT IV  

Last administered on 3/22/20at 23:29;  Start 3/22/20 at 22:00;  Stop 3/23/20 at 

21:59;  Status DC


Sodium Chloride 1,000 ml @  1,000 mls/hr Q1H PRN IV hypotension;  Start 3/23/20 

at 07:28;  Stop 3/23/20 at 13:27;  Status DC


Albumin Human 200 ml @  200 mls/hr 1X  ONCE IV  Last administered on 3/23/20at 

08:51;  Start 3/23/20 at 07:30;  Stop 3/23/20 at 08:29;  Status DC


Diphenhydramine HCl (Benadryl) 25 mg 1X PRN  PRN IV ITCHING;  Start 3/23/20 at 0

7:30;  Stop 3/24/20 at 07:29;  Status DC


Diphenhydramine HCl (Benadryl) 25 mg 1X PRN  PRN IV ITCHING;  Start 3/23/20 at 

07:30;  Stop 3/24/20 at 07:29;  Status DC


Sodium Chloride 1,000 ml @  400 mls/hr Q2H30M PRN IV PATENCY;  Start 3/23/20 at 

07:28;  Stop 3/23/20 at 19:27;  Status DC


Info (PHARMACY MONITORING -- do not chart) 1 each PRN DAILY  PRN MC SEE 

COMMENTS;  Start 3/23/20 at 07:30;  Stop 4/3/20 at 13:01;  Status DC


Metronidazole 100 ml @  100 mls/hr Q6HRS IV  Last administered on 4/8/20at 

06:26;  Start 3/23/20 at 08:30;  Stop 4/8/20 at 09:58;  Status DC


Micafungin Sodium 100 mg/Dextrose 100 ml @  100 mls/hr Q24H IV  Last admin

istered on 4/30/20at 08:18;  Start 3/23/20 at 09:00;  Stop 4/30/20 at 20:58;  

Status DC


Propofol 0 ml @ As Directed STK-MED ONCE IV ;  Start 3/23/20 at 07:53;  Stop 

3/23/20 at 07:53;  Status DC


Etomidate (Amidate) 20 mg STK-MED ONCE IV ;  Start 3/23/20 at 07:53;  Stop 

3/23/20 at 07:54;  Status DC


Midazolam HCl (Versed) 5 mg STK-MED ONCE .ROUTE ;  Start 3/23/20 at 07:57;  Stop

3/23/20 at 07:57;  Status DC


Fentanyl Citrate 30 ml @ 0 mls/hr CONT  PRN IV SEE PROTOCOL Last administered on

4/17/20at 06:12;  Start 3/23/20 at 08:15;  Stop 4/17/20 at 09:19;  Status DC


Artificial Tears (Artificial Tears) 1 drop PRN Q1HR  PRN OU DRY EYE, 1st choice;

 Start 3/23/20 at 08:15;  Stop 4/29/20 at 05:31;  Status DC


Midazolam HCl 50 mg/Sodium Chloride 50 ml @ 0 mls/hr CONT  PRN IV SEE PROTOCOL 

Last administered on 3/26/20at 22:39;  Start 3/23/20 at 08:15;  Stop 3/28/20 at 

15:59;  Status DC


Etomidate (Amidate) 8 mg 1X  ONCE IV  Last administered on 3/23/20at 08:33;  

Start 3/23/20 at 08:30;  Stop 3/23/20 at 08:31;  Status DC


Succinylcholine Chloride (Anectine) 120 mg 1X  ONCE IV  Last administered on 

3/23/20at 08:34;  Start 3/23/20 at 08:30;  Stop 3/23/20 at 08:31;  Status DC


Midazolam HCl (Versed) 5 mg 1X  ONCE IV ;  Start 3/23/20 at 08:30;  Stop 3/23/20

at 08:31;  Status DC


Potassium Chloride 15 meq/ Bicarbonate Dialysis Soln w/ out KCl 5,007.5 ml  @ 

1,000 mls/ hr Q5H1M IV  Last administered on 3/24/20at 11:11;  Start 3/23/20 at 

12:00;  Stop 3/24/20 at 11:15;  Status DC


Potassium Chloride 15 meq/ Bicarbonate Dialysis Soln w/ out KCl 5,007.5 ml  @ 

1,000 mls/ hr Q5H1M IV  Last administered on 3/24/20at 11:12;  Start 3/23/20 at 

12:00;  Stop 3/24/20 at 11:17;  Status DC


Potassium Chloride 15 meq/ Bicarbonate Dialysis Soln w/ out KCl 5,007.5 ml  @ 

1,000 mls/ hr Q5H1M IV  Last administered on 3/24/20at 11:11;  Start 3/23/20 at 

12:00;  Stop 3/24/20 at 11:19;  Status DC


Sodium Chloride 90 meq/Potassium Chloride 15 meq/ Potassium Phosphate 10 mmol/ 

Magnesium Sulfate 10 meq/Calcium Gluconate 20 meq/ Multivitamins 10 ml/Chromium/

Copper/Manganese/ Seleni/Zn 0.5 ml/ Total Parenteral Nutrition/Amino 

Acids/Dextrose/ Fat Emulsion Intravenous 1,400 ml @  58.333 mls/ hr TPN  CONT IV

 Last administered on 3/23/20at 21:42;  Start 3/23/20 at 22:00;  Stop 3/24/20 at

21:59;  Status DC


Heparin Sodium (Porcine) (Heparin Sodium) 5,000 unit Q8HRS SQ  Last administered

on 3/28/20at 05:55;  Start 3/23/20 at 15:00;  Stop 3/28/20 at 13:28;  Status DC


Meropenem 500 mg/ Sodium Chloride 50 ml @  100 mls/hr Q6HRS IV  Last 

administered on 3/25/20at 06:00;  Start 3/24/20 at 09:00;  Stop 3/25/20 at 

07:29;  Status DC


Potassium Phosphate 20 mmol/ Sodium Chloride 106.6667 ml @  51.667 m... 1X  ONCE

IV  Last administered on 3/24/20at 11:22;  Start 3/24/20 at 10:15;  Stop 3/24/20

at 12:18;  Status DC


Acetaminophen (Tylenol Supp) 650 mg PRN Q6HRS  PRN [place] MILD PAIN / TEMP > 100.3'F

Last administered on 6/18/20at 10:16;  Start 3/24/20 at 10:30


Potassium Chloride/Water 100 ml @  100 mls/hr Q1H IV  Last administered on 

3/24/20at 12:12;  Start 3/24/20 at 11:00;  Stop 3/24/20 at 12:59;  Status DC


Potassium Chloride 20 meq/ Bicarbonate Dialysis Soln w/ out KCl 5,010 ml @  

1,000 mls/hr Q5H1M IV  Last administered on 3/25/20at 08:48;  Start 3/24/20 at 

12:00;  Stop 3/25/20 at 13:03;  Status DC


Potassium Chloride 20 meq/ Bicarbonate Dialysis Soln w/ out KCl 5,010 ml @  

1,000 mls/hr Q5H1M IV  Last administered on 3/29/20at 14:52;  Start 3/24/20 at 

11:30;  Stop 3/29/20 at 19:59;  Status DC


Potassium Chloride 20 meq/ Bicarbonate Dialysis Soln w/ out KCl 5,010 ml @  

1,000 mls/hr Q5H1M IV  Last administered on 3/29/20at 14:53;  Start 3/24/20 at 

11:30;  Stop 3/29/20 at 19:59;  Status DC


Sodium Chloride 90 meq/Potassium Chloride 15 meq/ Potassium Phosphate 15 mmol/ 

Magnesium Sulfate 10 meq/Calcium Gluconate 15 meq/ Multivitamins 10 ml/Chromium/

Copper/Manganese/ Seleni/Zn 0.5 ml/ Total Parenteral Nutrition/Amino 

Acids/Dextrose/ Fat Emulsion Intravenous 1,400 ml @  58.333 mls/ hr TPN  CONT IV

 Last administered on 3/24/20at 22:17;  Start 3/24/20 at 22:00;  Stop 3/25/20 at

21:59;  Status DC


Cefepime HCl (Maxipime) 2 gm Q12HR IVP  Last administered on 4/7/20at 20:56;  

Start 3/25/20 at 09:00;  Stop 4/8/20 at 09:58;  Status DC


Daptomycin 500 mg/ Sodium Chloride 50 ml @  100 mls/hr Q48H IV  Last 

administered on 4/10/20at 09:57;  Start 3/25/20 at 08:30;  Stop 4/10/20 at 

10:07;  Status DC


Lidocaine HCl (Buffered Lidocaine 1%) 3 ml 1X  ONCE INJ  Last administered on 

3/25/20at 10:27;  Start 3/25/20 at 10:30;  Stop 3/25/20 at 10:31;  Status DC


Potassium Phosphate 20 mmol/ Sodium Chloride 106.6667 ml @  51.667 m... 1X  ONCE

IV  Last administered on 3/25/20at 12:51;  Start 3/25/20 at 13:00;  Stop 3/25/20

at 15:03;  Status DC


Sodium Chloride 90 meq/Potassium Chloride 15 meq/ Potassium Phosphate 18 mmol/ 

Magnesium Sulfate 8 meq/Calcium Gluconate 15 meq/ Multivitamins 10 ml/Chromium/ 

Copper/Manganese/ Seleni/Zn 0.5 ml/ Total Parenteral Nutrition/Amino 

Acids/Dextrose/ Fat Emulsion Intravenous 1,400 ml @  58.333 mls/ hr TPN  CONT IV

 Last administered on 3/25/20at 22:16;  Start 3/25/20 at 22:00;  Stop 3/26/20 at

21:59;  Status DC


Potassium Chloride 20 meq/ Bicarbonate Dialysis Soln w/ out KCl 5,010 ml @  

1,000 mls/hr Q5H1M IV  Last administered on 3/29/20at 14:54;  Start 3/25/20 at 

16:00;  Stop 3/29/20 at 19:59;  Status DC


Multi-Ingred Cream/Lotion/Oil/ Oint (Artificial Tears Eye Ointment) 1 thomas PRN 

Q1HR  PRN OU DRY EYE, 2nd choice Last administered on 4/13/20at 08:19;  Start 

3/25/20 at 17:30;  Stop 6/3/20 at 14:39;  Status DC


Sodium Chloride 90 meq/Potassium Chloride 15 meq/ Potassium Phosphate 18 mmol/ 

Magnesium Sulfate 8 meq/Calcium Gluconate 15 meq/ Multivitamins 10 ml/Chromium/ 

Copper/Manganese/ Seleni/Zn 0.5 ml/ Total Parenteral Nutrition/Amino 

Acids/Dextrose/ Fat Emulsion Intravenous 1,400 ml @  58.333 mls/ hr TPN  CONT IV

 Last administered on 3/26/20at 22:00;  Start 3/26/20 at 22:00;  Stop 3/27/20 at

21:59;  Status DC


Albumin Human 500 ml @  125 mls/hr 1X  ONCE IV ;  Start 3/26/20 at 14:15;  Stop 

3/26/20 at 18:14;  Status DC


Sodium Chloride 90 meq/Potassium Chloride 15 meq/ Potassium Phosphate 18 mmol/ 

Magnesium Sulfate 8 meq/Calcium Gluconate 15 meq/ Multivitamins 10 ml/Chromium/ 

Copper/Manganese/ Seleni/Zn 0.5 ml/ Insulin Human Regular 10 unit/ Total 

Parenteral Nutrition/Amino Acids/Dextrose/ Fat Emulsion Intravenous 1,400 ml @  

58.333 mls/ hr TPN  CONT IV  Last administered on 3/27/20at 21:43;  Start 

3/27/20 at 22:00;  Stop 3/28/20 at 21:59;  Status DC


Lidocaine HCl (Buffered Lidocaine 1%) 3 ml STK-MED ONCE .ROUTE ;  Start 3/25/20 

at 10:00;  Stop 3/27/20 at 13:57;  Status DC


Midazolam HCl 100 mg/Sodium Chloride 100 ml @ 7 mls/hr CONT  PRN IV SEE PROTOCOL

Last administered on 4/8/20at 15:35;  Start 3/28/20 at 16:00;  Stop 6/3/20 at 

14:38;  Status DC


Sodium Chloride 90 meq/Potassium Chloride 15 meq/ Potassium Phosphate 18 mmol/ 

Magnesium Sulfate 8 meq/Calcium Gluconate 15 meq/ Multivitamins 10 ml/Chromium/ 

Copper/Manganese/ Seleni/Zn 0.5 ml/ Insulin Human Regular 15 unit/ Total 

Parenteral Nutrition/Amino Acids/Dextrose/ Fat Emulsion Intravenous 1,400 ml @  

58.333 mls/ hr TPN  CONT IV  Last administered on 3/28/20at 20:34;  Start 

3/28/20 at 22:00;  Stop 3/29/20 at 21:59;  Status DC


Info ([facility] Electrolyte Protocol) 1 ea CONT PRN  PRN MC PER PROTOCOL;  Start 

3/29/20 at 13:15


Sodium Chloride 90 meq/Potassium Chloride 15 meq/ Potassium Phosphate 18 mmol/ 

Magnesium Sulfate 8 meq/Calcium Gluconate 15 meq/ Multivitamins 10 ml/Chromium/ 

Copper/Manganese/ Seleni/Zn 0.5 ml/ Insulin Human Regular 15 unit/ Total 

Parenteral Nutrition/Amino Acids/Dextrose/ Fat Emulsion Intravenous 1,400 ml @  

58.333 mls/ hr TPN  CONT IV  Last administered on 3/29/20at 22:05;  Start 

3/29/20 at 22:00;  Stop 3/30/20 at 21:59;  Status DC


Potassium Chloride 15 meq/ Bicarbonate Dialysis Soln w/ out KCl 5,007.5 ml  @ 

1,000 mls/ hr Q5H1M IV  Last administered on 4/1/20at 18:14;  Start 3/29/20 at 

20:00;  Stop 4/2/20 at 13:08;  Status DC


Potassium Chloride 15 meq/ Bicarbonate Dialysis Soln w/ out KCl 5,007.5 ml  @ 

1,000 mls/ hr Q5H1M IV  Last administered on 4/1/20at 18:14;  Start 3/29/20 at 

20:00;  Stop 4/2/20 at 13:08;  Status DC


Potassium Chloride 15 meq/ Bicarbonate Dialysis Soln w/ out KCl 5,007.5 ml  @ 

1,000 mls/ hr Q5H1M IV  Last administered on 4/1/20at 18:14;  Start 3/29/20 at 

20:00;  Stop 4/2/20 at 13:08;  Status DC


Iohexol (Omnipaque 240 Mg/ml) 30 ml 1X  ONCE PO  Last administered on 3/30/20at 

11:30;  Start 3/30/20 at 11:30;  Stop 3/30/20 at 11:33;  Status DC


Info (CONTRAST GIVEN -- Rx MONITORING) 1 each PRN DAILY  PRN MC SEE COMMENTS;  

Start 3/30/20 at 11:45;  Stop 4/1/20 at 11:44;  Status DC


Sodium Chloride 90 meq/Potassium Chloride 15 meq/ Potassium Phosphate 18 mmol/ 

Magnesium Sulfate 8 meq/Calcium Gluconate 15 meq/ Multivitamins 10 ml/Chromium/ 

Copper/Manganese/ Seleni/Zn 0.5 ml/ Insulin Human Regular 15 unit/ Total 

Parenteral Nutrition/Amino Acids/Dextrose/ Fat Emulsion Intravenous 1,400 ml @  

58.333 mls/ hr TPN  CONT IV  Last administered on 3/30/20at 21:47;  Start 

3/30/20 at 22:00;  Stop 3/31/20 at 21:59;  Status DC


Sodium Chloride 90 meq/Potassium Chloride 15 meq/ Potassium Phosphate 18 mmol/ 

Magnesium Sulfate 8 meq/Calcium Gluconate 15 meq/ Multivitamins 10 ml/Chromium/ 

Copper/Manganese/ Seleni/Zn 0.5 ml/ Insulin Human Regular 20 unit/ Total 

Parenteral Nutrition/Amino Acids/Dextrose/ Fat Emulsion Intravenous 1,400 ml @  

58.333 mls/ hr TPN  CONT IV  Last administered on 3/31/20at 21:36;  Start 3/31/2

0 at 22:00;  Stop 4/1/20 at 21:59;  Status DC


Alteplase, Recombinant (Cathflo For Central Catheter Clearance) 1 mg 1X  ONCE 

INT CAT  Last administered on 3/31/20at 20:03;  Start 3/31/20 at 19:30;  Stop 

3/31/20 at 19:46;  Status DC


Alteplase, Recombinant (Cathflo For Central Catheter Clearance) 1 mg 1X  ONCE 

INT CAT  Last administered on 3/31/20at 22:05;  Start 3/31/20 at 22:00;  Stop 

3/31/20 at 22:01;  Status DC


Sodium Chloride 90 meq/Potassium Chloride 15 meq/ Potassium Phosphate 18 mmol/ 

Magnesium Sulfate 8 meq/Calcium Gluconate 15 meq/ Multivitamins 10 ml/Chromium/ 

Copper/Manganese/ Seleni/Zn 0.5 ml/ Insulin Human Regular 20 unit/ Total 

Parenteral Nutrition/Amino Acids/Dextrose/ Fat Emulsion Intravenous 1,400 ml @  

58.333 mls/ hr TPN  CONT IV  Last administered on 4/1/20at 21:30;  Start 4/1/20 

at 22:00;  Stop 4/2/20 at 21:59;  Status DC


Dexmedetomidine HCl 400 mcg/ Sodium Chloride 100 ml @ 0 mls/hr CONT  PRN IV 

ANXIETY / AGITATION Last administered on 5/30/20at 12:57;  Start 4/2/20 at 

08:15;  Stop 5/30/20 at 18:31;  Status DC


Sodium Chloride 500 ml @  500 mls/hr 1X PRN  PRN IV ELEVATED BP, SEE COMMENTS;  

Start 4/2/20 at 08:15


Atropine Sulfate (ATROPINE 0.5mg SYRINGE) 0.5 mg PRN Q5MIN  PRN IV SEE COMMENTS;

 Start 4/2/20 at 08:15


Furosemide (Lasix) 20 mg 1X  ONCE IVP  Last administered on 4/2/20at 08:19;  

Start 4/2/20 at 08:15;  Stop 4/2/20 at 08:16;  Status DC


Lidocaine HCl (Buffered Lidocaine 1%) 3 ml STK-MED ONCE .ROUTE ;  Start 4/2/20 

at 08:39;  Stop 4/2/20 at 08:39;  Status DC


Lidocaine HCl (Buffered Lidocaine 1%) 6 ml 1X  ONCE INJ  Last administered on 

4/2/20at 09:05;  Start 4/2/20 at 09:00;  Stop 4/2/20 at 09:06;  Status DC


Sodium Chloride 90 meq/Potassium Chloride 15 meq/ Potassium Phosphate 18 mmol/ 

Magnesium Sulfate 8 meq/Calcium Gluconate 15 meq/ Multivitamins 10 ml/Chromium/ 

Copper/Manganese/ Seleni/Zn 0.5 ml/ Insulin Human Regular 20 unit/ Total 

Parenteral Nutrition/Amino Acids/Dextrose/ Fat Emulsion Intravenous 1,400 ml @  

58.333 mls/ hr TPN  CONT IV  Last administered on 4/2/20at 22:45;  Start 4/2/20 

at 22:00;  Stop 4/3/20 at 21:59;  Status DC


Sodium Chloride 1,000 ml @  1,000 mls/hr Q1H PRN IV hypotension;  Start 4/3/20 a

t 07:30;  Stop 4/3/20 at 13:29;  Status DC


Albumin Human 200 ml @  200 mls/hr 1X PRN  PRN IV Hypotension Last administered 

on 4/3/20at 09:36;  Start 4/3/20 at 07:30;  Stop 4/3/20 at 13:29;  Status DC


Sodium Chloride (Normal Saline Flush) 10 ml 1X PRN  PRN IV AP catheter pack;  

Start 4/3/20 at 07:30;  Stop 4/3/20 at 21:29;  Status DC


Sodium Chloride (Normal Saline Flush) 10 ml 1X PRN  PRN IV  catheter pack;  

Start 4/3/20 at 07:30;  Stop 4/4/20 at 07:29;  Status DC


Sodium Chloride 1,000 ml @  400 mls/hr Q2H30M PRN IV PATENCY;  Start 4/3/20 at 

07:30;  Stop 4/3/20 at 19:29;  Status DC


Info (PHARMACY MONITORING -- do not chart) 1 each PRN DAILY  PRN MC SEE 

COMMENTS;  Start 4/3/20 at 07:30;  Stop 4/3/20 at 13:02;  Status DC


Info (PHARMACY MONITORING -- do not chart) 1 each PRN DAILY  PRN MC SEE 

COMMENTS;  Start 4/3/20 at 07:30;  Stop 4/5/20 at 12:45;  Status DC


Sodium Chloride 90 meq/Potassium Chloride 15 meq/ Potassium Phosphate 10 mmol/ 

Magnesium Sulfate 8 meq/Calcium Gluconate 15 meq/ Multivitamins 10 ml/Chromium/ 

Copper/Manganese/ Seleni/Zn 0.5 ml/ Insulin Human Regular 25 unit/ Total 

Parenteral Nutrition/Amino Acids/Dextrose/ Fat Emulsion Intravenous 1,400 ml @  

58.333 mls/ hr TPN  CONT IV  Last administered on 4/3/20at 22:19;  Start 4/3/20 

at 22:00;  Stop 4/4/20 at 21:59;  Status DC


Heparin Sodium (Porcine) (Heparin Sodium) 5,000 unit Q12HR SQ  Last administered

on 4/26/20at 08:59;  Start 4/3/20 at 21:00;  Stop 4/26/20 at 10:05;  Status DC


Ondansetron HCl (Zofran) 4 mg PRN Q6HRS  PRN IV NAUSEA/VOMITING;  Start 4/6/20 

at 07:00;  Stop 4/7/20 at 06:59;  Status DC


Fentanyl Citrate (Fentanyl 2ml Vial) 25 mcg PRN Q5MIN  PRN IV MILD PAIN 1-3;  

Start 4/6/20 at 07:00;  Stop 4/7/20 at 06:59;  Status DC


Fentanyl Citrate (Fentanyl 2ml Vial) 50 mcg PRN Q5MIN  PRN IV MODERATE TO SEVERE

PAIN;  Start 4/6/20 at 07:00;  Stop 4/7/20 at 06:59;  Status DC


Ringer's Solution 1,000 ml @  30 mls/hr Q24H IV ;  Start 4/6/20 at 07:00;  Stop 

4/6/20 at 18:59;  Status DC


Lidocaine HCl (Xylocaine-Mpf 1% 2ml Vial) 2 ml PRN 1X  PRN ID PRIOR TO IV START;

 Start 4/6/20 at 07:00;  Stop 4/7/20 at 06:59;  Status DC


Prochlorperazine Edisylate (Compazine) 5 mg PACU PRN  PRN IV NAUSEA, MRX1;  

Start 4/6/20 at 07:00;  Stop 4/7/20 at 06:59;  Status DC


Sodium Chloride 1,000 ml @  1,000 mls/hr Q1H PRN IV hypotension;  Start 4/4/20 

at 09:10;  Stop 4/4/20 at 15:09;  Status DC


Albumin Human 200 ml @  200 mls/hr 1X PRN  PRN IV Hypotension Last administered 

on 4/4/20at 10:10;  Start 4/4/20 at 09:15;  Stop 4/4/20 at 15:14;  Status DC


Sodium Chloride 1,000 ml @  400 mls/hr Q2H30M PRN IV PATENCY;  Start 4/4/20 at 

09:10;  Stop 4/4/20 at 21:09;  Status DC


Info (PHARMACY MONITORING -- do not chart) 1 each PRN DAILY  PRN MC SEE 

COMMENTS;  Start 4/4/20 at 09:15;  Stop 4/5/20 at 12:45;  Status DC


Info (PHARMACY MONITORING -- do not chart) 1 each PRN DAILY  PRN MC SEE 

COMMENTS;  Start 4/4/20 at 09:15;  Stop 4/5/20 at 12:45;  Status DC


Sodium Chloride 90 meq/Potassium Chloride 15 meq/ Potassium Phosphate 10 mmol/ 

Magnesium Sulfate 8 meq/Calcium Gluconate 15 meq/ Multivitamins 10 ml/Chromium/ 

Copper/Manganese/ Seleni/Zn 0.5 ml/ Insulin Human Regular 25 unit/ Total 

Parenteral Nutrition/Amino Acids/Dextrose/ Fat Emulsion Intravenous 1,400 ml @  

58.333 mls/ hr TPN  CONT IV  Last administered on 4/4/20at 22:10;  Start 4/4/20 

at 22:00;  Stop 4/5/20 at 21:59;  Status DC


Magnesium Sulfate 50 ml @ 25 mls/hr PRN DAILY  PRN IV for Mag < 1.7 on am labs 

Last administered on 6/18/20at 10:57;  Start 4/5/20 at 09:15


Sodium Chloride 90 meq/Potassium Chloride 15 meq/ Potassium Phosphate 10 mmol/ 

Magnesium Sulfate 8 meq/Calcium Gluconate 15 meq/ Multivitamins 10 ml/Chromium/ 

Copper/Manganese/ Seleni/Zn 0.5 ml/ Insulin Human Regular 25 unit/ Total 

Parenteral Nutrition/Amino Acids/Dextrose/ Fat Emulsion Intravenous 1,400 ml @  

58.333 mls/ hr TPN  CONT IV  Last administered on 4/5/20at 21:20;  Start 4/5/20 

at 22:00;  Stop 4/6/20 at 21:59;  Status DC


Sodium Chloride 1,000 ml @  1,000 mls/hr Q1H PRN IV hypotension;  Start 4/5/20 

at 12:23;  Stop 4/5/20 at 18:22;  Status DC


Albumin Human 200 ml @  200 mls/hr 1X  ONCE IV  Last administered on 4/5/20at 

13:34;  Start 4/5/20 at 12:30;  Stop 4/5/20 at 13:29;  Status DC


Diphenhydramine HCl (Benadryl) 25 mg 1X PRN  PRN IV ITCHING;  Start 4/5/20 at 

12:30;  Stop 4/6/20 at 12:29;  Status DC


Diphenhydramine HCl (Benadryl) 25 mg 1X PRN  PRN IV ITCHING;  Start 4/5/20 at 

12:30;  Stop 4/6/20 at 12:29;  Status DC


Info (PHARMACY MONITORING -- do not chart) 1 each PRN DAILY  PRN MC SEE 

COMMENTS;  Start 4/5/20 at 12:30;  Status Cancel


Bupivacaine HCl/ Epinephrine Bitart (Sensorcain-Epi 0.5%-1:363133 Mpf) 30 ml 

STK-MED ONCE .ROUTE  Last administered on 4/6/20at 11:44;  Start 4/6/20 at 

11:00;  Stop 4/6/20 at 11:01;  Status DC


Cellulose (Surgicel Fibrillar 1x2) 1 each STK-MED ONCE .ROUTE ;  Start 4/6/20 at

11:00;  Stop 4/6/20 at 11:01;  Status DC


Sodium Chloride 90 meq/Potassium Chloride 15 meq/ Potassium Phosphate 10 mmol/ 

Magnesium Sulfate 12 meq/Calcium Gluconate 15 meq/ Multivitamins 10 ml/Chromium/

Copper/Manganese/ Seleni/Zn 0.5 ml/ Insulin Human Regular 25 unit/ Total Parent

eral Nutrition/Amino Acids/Dextrose/ Fat Emulsion Intravenous 1,400 ml @  58.333

mls/ hr TPN  CONT IV  Last administered on 4/6/20at 22:24;  Start 4/6/20 at 

22:00;  Stop 4/7/20 at 21:59;  Status DC


Propofol 20 ml @ As Directed STK-MED ONCE IV ;  Start 4/6/20 at 11:07;  Stop 

4/6/20 at 11:07;  Status DC


Cellulose (Surgicel Hemostat 4x8) 1 each STK-MED ONCE .ROUTE  Last administered 

on 4/6/20at 11:44;  Start 4/6/20 at 11:55;  Stop 4/6/20 at 11:56;  Status DC


Sevoflurane (Ultane) 60 ml STK-MED ONCE IH ;  Start 4/6/20 at 12:46;  Stop 

4/6/20 at 12:46;  Status DC


Sodium Chloride 1,000 ml @  1,000 mls/hr Q1H PRN IV hypotension;  Start 4/6/20 

at 13:51;  Stop 4/6/20 at 19:50;  Status DC


Albumin Human 200 ml @  200 mls/hr 1X PRN  PRN IV Hypotension Last administered 

on 4/6/20at 14:51;  Start 4/6/20 at 14:00;  Stop 4/6/20 at 19:59;  Status DC


Diphenhydramine HCl (Benadryl) 25 mg 1X PRN  PRN IV ITCHING;  Start 4/6/20 at 

14:00;  Stop 4/7/20 at 13:59;  Status DC


Diphenhydramine HCl (Benadryl) 25 mg 1X PRN  PRN IV ITCHING;  Start 4/6/20 at 

14:00;  Stop 4/7/20 at 13:59;  Status DC


Sodium Chloride 1,000 ml @  400 mls/hr Q2H30M PRN IV PATENCY;  Start 4/6/20 at 

13:51;  Stop 4/7/20 at 01:50;  Status DC


Info (PHARMACY MONITORING -- do not chart) 1 each PRN DAILY  PRN MC SEE COMMENT

S;  Start 4/6/20 at 14:00;  Stop 4/9/20 at 08:16;  Status DC


Heparin Sodium (Porcine) (Hep Lock Adult) 500 unit STK-MED ONCE IVP ;  Start 

4/7/20 at 09:29;  Stop 4/7/20 at 09:30;  Status DC


Sodium Chloride 1,000 ml @  1,000 mls/hr Q1H PRN IV hypotension;  Start 4/7/20 

at 10:43;  Stop 4/7/20 at 16:42;  Status DC


Sodium Chloride 1,000 ml @  400 mls/hr Q2H30M PRN IV PATENCY;  Start 4/7/20 at 

10:43;  Stop 4/7/20 at 22:42;  Status DC


Info (PHARMACY MONITORING -- do not chart) 1 each PRN DAILY  PRN MC SEE COMMENTS

;  Start 4/7/20 at 10:45;  Status UNV


Info (PHARMACY MONITORING -- do not chart) 1 each PRN DAILY  PRN MC SEE 

COMMENTS;  Start 4/7/20 at 10:45;  Status UNV


Sodium Chloride 90 meq/Potassium Chloride 15 meq/ Magnesium Sulfate 12 

meq/Calcium Gluconate 15 meq/ Multivitamins 10 ml/Chromium/ Copper/Manganese/ 

Seleni/Zn 0.5 ml/ Insulin Human Regular 25 unit/ Total Parenteral Nutrition/Ami

no Acids/Dextrose/ Fat Emulsion Intravenous 1,400 ml @  58.333 mls/ hr TPN  CONT

IV  Last administered on 4/7/20at 22:13;  Start 4/7/20 at 22:00;  Stop 4/8/20 at

21:59;  Status DC


Sodium Chloride 1,000 ml @  1,000 mls/hr Q1H PRN IV hypotension;  Start 4/8/20 

at 07:50;  Stop 4/8/20 at 13:49;  Status DC


Albumin Human 200 ml @  200 mls/hr 1X  ONCE IV ;  Start 4/8/20 at 08:00;  Stop 

4/8/20 at 08:53;  Status DC


Diphenhydramine HCl (Benadryl) 25 mg 1X PRN  PRN IV ITCHING;  Start 4/8/20 at 

08:00;  Stop 4/9/20 at 07:59;  Status DC


Diphenhydramine HCl (Benadryl) 25 mg 1X PRN  PRN IV ITCHING;  Start 4/8/20 at 

08:00;  Stop 4/9/20 at 07:59;  Status DC


Info (PHARMACY MONITORING -- do not chart) 1 each PRN DAILY  PRN MC SEE 

COMMENTS;  Start 4/8/20 at 08:00;  Stop 4/9/20 at 08:16;  Status DC


Albumin Human 50 ml @ 50 mls/hr 1X  ONCE IV ;  Start 4/8/20 at 08:53;  Stop 

4/8/20 at 08:56;  Status DC


Albumin Human 200 ml @  50 mls/hr PRN 1X  PRN IV HYPOTENSION Last administered 

on 4/14/20at 11:54;  Start 4/8/20 at 09:00;  Stop 5/21/20 at 11:14;  Status DC


Meropenem 500 mg/ Sodium Chloride 50 ml @  100 mls/hr Q12H IV  Last administered

on 4/28/20at 10:45;  Start 4/8/20 at 10:00;  Stop 4/28/20 at 12:37;  Status DC


Sodium Chloride 90 meq/Magnesium Sulfate 12 meq/ Calcium Gluconate 15 meq/ 

Multivitamins 10 ml/Chromium/ Copper/Manganese/ Seleni/Zn 0.5 ml/ Insulin Human 

Regular 25 unit/ Total Parenteral Nutrition/Amino Acids/Dextrose/ Fat Emulsion 

Intravenous 1,400 ml @  58.333 mls/ hr TPN  CONT IV  Last administered on 

4/8/20at 21:41;  Start 4/8/20 at 22:00;  Stop 4/9/20 at 21:59;  Status DC


Sodium Chloride 1,000 ml @  1,000 mls/hr Q1H PRN IV hypotension;  Start 4/9/20 

at 07:58;  Stop 4/9/20 at 13:57;  Status DC


Albumin Human 200 ml @  200 mls/hr 1X PRN  PRN IV Hypotension Last administered 

on 4/9/20at 09:30;  Start 4/9/20 at 08:00;  Stop 4/9/20 at 13:59;  Status DC


Sodium Chloride 1,000 ml @  400 mls/hr Q2H30M PRN IV PATENCY;  Start 4/9/20 at 

07:58;  Stop 4/9/20 at 19:57;  Status DC


Info (PHARMACY MONITORING -- do not chart) 1 each PRN DAILY  PRN MC SEE 

COMMENTS;  Start 4/9/20 at 08:00;  Status Cancel


Info (PHARMACY MONITORING -- do not chart) 1 each PRN DAILY  PRN MC SEE 

COMMENTS;  Start 4/9/20 at 08:15;  Status UNV


Sodium Chloride 90 meq/Potassium Phosphate 5 mmol/ Magnesium Sulfate 12 m

eq/Calcium Gluconate 15 meq/ Multivitamins 10 ml/Chromium/ Copper/Manganese/ 

Seleni/Zn 0.5 ml/ Insulin Human Regular 30 unit/ Total Parenteral 

Nutrition/Amino Acids/Dextrose/ Fat Emulsion Intravenous 1,400 ml @  58.333 mls/

hr TPN  CONT IV  Last administered on 4/9/20at 22:08;  Start 4/9/20 at 22:00;  

Stop 4/10/20 at 21:59;  Status DC


Linezolid/Dextrose 300 ml @  300 mls/hr Q12HR IV  Last administered on 4/20/20at

20:40;  Start 4/10/20 at 11:00;  Stop 4/21/20 at 08:10;  Status DC


Sodium Chloride 90 meq/Potassium Phosphate 15 mmol/ Magnesium Sulfate 12 

meq/Calcium Gluconate 15 meq/ Multivitamins 10 ml/Chromium/ Copper/Manganese/ 

Seleni/Zn 0.5 ml/ Insulin Human Regular 30 unit/ Total Parenteral 

Nutrition/Amino Acids/Dextrose/ Fat Emulsion Intravenous 1,400 ml @  58.333 mls/

hr TPN  CONT IV  Last administered on 4/10/20at 21:49;  Start 4/10/20 at 22:00; 

Stop 4/11/20 at 21:59;  Status DC


Sodium Chloride 90 meq/Potassium Phosphate 15 mmol/ Magnesium Sulfate 12 

meq/Calcium Gluconate 15 meq/ Multivitamins 10 ml/Chromium/ Copper/Manganese/ 

Seleni/Zn 0.5 ml/ Insulin Human Regular 40 unit/ Total Parenteral 

Nutrition/Amino Acids/Dextrose/ Fat Emulsion Intravenous 1,400 ml @  58.333 mls/

hr TPN  CONT IV  Last administered on 4/11/20at 21:21;  Start 4/11/20 at 22:00; 

Stop 4/12/20 at 21:59;  Status DC


Sodium Chloride 1,000 ml @  1,000 mls/hr Q1H PRN IV hypotension;  Start 4/11/20 

at 13:26;  Stop 4/11/20 at 19:25;  Status DC


Albumin Human 200 ml @  200 mls/hr 1X PRN  PRN IV Hypotension Last administered 

on 4/11/20at 15:00;  Start 4/11/20 at 13:30;  Stop 4/11/20 at 19:29;  Status DC


Sodium Chloride (Normal Saline Flush) 10 ml 1X PRN  PRN IV AP catheter pack;  

Start 4/11/20 at 13:30;  Stop 4/12/20 at 13:29;  Status DC


Sodium Chloride (Normal Saline Flush) 10 ml 1X PRN  PRN IV  catheter pack;  

Start 4/11/20 at 13:30;  Stop 4/12/20 at 13:29;  Status DC


Sodium Chloride 1,000 ml @  400 mls/hr Q2H30M PRN IV PATENCY;  Start 4/11/20 at 

13:26;  Stop 4/12/20 at 01:25;  Status DC


Info (PHARMACY MONITORING -- do not chart) 1 each PRN DAILY  PRN MC SEE 

COMMENTS;  Start 4/11/20 at 13:30;  Stop 4/11/20 at 13:33;  Status DC


Info (PHARMACY MONITORING -- do not chart) 1 each PRN DAILY  PRN MC SEE 

COMMENTS;  Start 4/11/20 at 13:30;  Stop 4/11/20 at 13:34;  Status DC


Sodium Chloride 90 meq/Potassium Phosphate 19 mmol/ Magnesium Sulfate 12 

meq/Calcium Gluconate 15 meq/ Multivitamins 10 ml/Chromium/ Copper/Manganese/ 

Seleni/Zn 0.5 ml/ Insulin Human Regular 40 unit/ Total Parenteral 

Nutrition/Amino Acids/Dextrose/ Fat Emulsion Intravenous 1,400 ml @  58.333 mls/

hr TPN  CONT IV  Last administered on 4/12/20at 21:54;  Start 4/12/20 at 22:00; 

Stop 4/13/20 at 21:59;  Status DC


Sodium Chloride 1,000 ml @  1,000 mls/hr Q1H PRN IV hypotension;  Start 4/13/20 

at 09:35;  Stop 4/13/20 at 15:34;  Status DC


Albumin Human 200 ml @  200 mls/hr 1X PRN  PRN IV Hypotension;  Start 4/13/20 at

09:45;  Stop 4/13/20 at 15:44;  Status DC


Diphenhydramine HCl (Benadryl) 25 mg 1X PRN  PRN IV ITCHING;  Start 4/13/20 at 

09:45;  Stop 4/14/20 at 09:44;  Status DC


Diphenhydramine HCl (Benadryl) 25 mg 1X PRN  PRN IV ITCHING;  Start 4/13/20 at 

09:45;  Stop 4/14/20 at 09:44;  Status DC


Sodium Chloride 1,000 ml @  400 mls/hr Q2H30M PRN IV PATENCY;  Start 4/13/20 at 

09:35;  Stop 4/13/20 at 21:34;  Status DC


Info (PHARMACY MONITORING -- do not chart) 1 each PRN DAILY  PRN MC SEE 

COMMENTS;  Start 4/13/20 at 09:45;  Status Cancel


Sodium Chloride 100 meq/Potassium Phosphate 19 mmol/ Magnesium Sulfate 12 

meq/Calcium Gluconate 15 meq/ Multivitamins 10 ml/Chromium/ Copper/Manganese/ 

Seleni/Zn 0.5 ml/ Insulin Human Regular 40 unit/ Potassium Chloride 20 meq/ 

Total Parenteral Nutrition/Amino Acids/Dextrose/ Fat Emulsion Intravenous 1,400 

ml @  58.333 mls/ hr TPN  CONT IV  Last administered on 4/13/20at 22:02;  Start 

4/13/20 at 22:00;  Stop 4/14/20 at 21:59;  Status DC


Furosemide (Lasix) 40 mg 1X  ONCE IVP  Last administered on 4/13/20at 14:39;  

Start 4/13/20 at 14:30;  Stop 4/13/20 at 14:31;  Status DC


Metronidazole 100 ml @  100 mls/hr Q8HRS IV  Last administered on 4/21/20at 

06:04;  Start 4/14/20 at 10:00;  Stop 4/21/20 at 08:10;  Status DC


Sodium Chloride 1,000 ml @  1,000 mls/hr Q1H PRN IV hypotension;  Start 4/14/20 

at 08:00;  Stop 4/14/20 at 13:59;  Status DC


Albumin Human 200 ml @  200 mls/hr 1X PRN  PRN IV Hypotension;  Start 4/14/20 at

08:00;  Stop 4/14/20 at 13:59;  Status DC


Sodium Chloride 1,000 ml @  400 mls/hr Q2H30M PRN IV PATENCY;  Start 4/14/20 at 

08:00;  Stop 4/14/20 at 19:59;  Status DC


Info (PHARMACY MONITORING -- do not chart) 1 each PRN DAILY  PRN MC SEE 

COMMENTS;  Start 4/14/20 at 11:30;  Status UNV


Info (PHARMACY MONITORING -- do not chart) 1 each PRN DAILY  PRN MC SEE 

COMMENTS;  Start 4/14/20 at 11:30;  Stop 4/16/20 at 12:13;  Status DC


Sodium Chloride 100 meq/Potassium Phosphate 19 mmol/ Magnesium Sulfate 12 

meq/Calcium Gluconate 15 meq/ Multivitamins 10 ml/Chromium/ Copper/Manganese/ 

Seleni/Zn 0.5 ml/ Insulin Human Regular 40 unit/ Potassium Chloride 20 meq/ 

Total Parenteral Nutrition/Amino Acids/Dextrose/ Fat Emulsion Intravenous 1,400 

ml @  58.333 mls/ hr TPN  CONT IV  Last administered on 4/14/20at 21:52;  Start 

4/14/20 at 22:00;  Stop 4/15/20 at 21:59;  Status DC


Sodium Chloride (Normal Saline Flush) 10 ml QSHIFT  PRN IV AFTER MEDS AND BLOOD 

DRAWS;  Start 4/14/20 at 15:00;  Stop 5/12/20 at 11:27;  Status DC


Sodium Chloride (Normal Saline Flush) 10 ml PRN Q5MIN  PRN IV AFTER MEDS AND 

BLOOD DRAWS;  Start 4/14/20 at 15:00


Sodium Chloride (Normal Saline Flush) 20 ml PRN Q5MIN  PRN IV AFTER MEDS AND 

BLOOD DRAWS;  Start 4/14/20 at 15:00


Sodium Chloride 100 meq/Potassium Phosphate 19 mmol/ Magnesium Sulfate 12 

meq/Calcium Gluconate 15 meq/ Multivitamins 10 ml/Chromium/ Copper/Manganese/ 

Seleni/Zn 0.5 ml/ Insulin Human Regular 40 unit/ Potassium Chloride 20 meq/ 

Total Parenteral Nutrition/Amino Acids/Dextrose/ Fat Emulsion Intravenous 1,400 

ml @  58.333 mls/ hr TPN  CONT IV  Last administered on 4/15/20at 21:20;  Start 

4/15/20 at 22:00;  Stop 4/16/20 at 21:59;  Status DC


Lidocaine HCl (Buffered Lidocaine 1%) 3 ml STK-MED ONCE .ROUTE ;  Start 4/15/20 

at 13:16;  Stop 4/15/20 at 13:16;  Status DC


Lidocaine HCl (Buffered Lidocaine 1%) 6 ml 1X  ONCE INJ  Last administered on 

4/15/20at 13:45;  Start 4/15/20 at 13:30;  Stop 4/15/20 at 13:31;  Status DC


Albumin Human 100 ml @  100 mls/hr 1X  ONCE IV  Last administered on 4/15/20at 

15:41;  Start 4/15/20 at 15:00;  Stop 4/15/20 at 15:59;  Status DC


Albumin Human 50 ml @ 50 mls/hr 1X  ONCE IV  Last administered on 4/15/20at 

15:00;  Start 4/15/20 at 15:00;  Stop 4/15/20 at 15:59;  Status DC


Info (PHARMACY MONITORING -- do not chart) 1 each PRN DAILY  PRN MC SEE 

COMMENTS;  Start 4/16/20 at 11:30;  Status Cancel


Info (PHARMACY MONITORING -- do not chart) 1 each PRN DAILY  PRN MC SEE 

COMMENTS;  Start 4/16/20 at 11:30;  Status UNV


Sodium Chloride 100 meq/Potassium Phosphate 10 mmol/ Magnesium Sulfate 12 

meq/Calcium Gluconate 15 meq/ Multivitamins 10 ml/Chromium/ Copper/Manganese/ 

Seleni/Zn 0.5 ml/ Insulin Human Regular 35 unit/ Potassium Chloride 20 meq/ 

Total Parenteral Nutrition/Amino Acids/Dextrose/ Fat Emulsion Intravenous 1,400 

ml @  58.333 mls/ hr TPN  CONT IV  Last administered on 4/16/20at 22:10;  Start 

4/16/20 at 22:00;  Stop 4/17/20 at 21:59;  Status DC


Sodium Chloride 100 meq/Potassium Phosphate 5 mmol/ Magnesium Sulfate 12 

meq/Calcium Gluconate 15 meq/ Multivitamins 10 ml/Chromium/ Copper/Manganese/ 

Seleni/Zn 0.5 ml/ Insulin Human Regular 35 unit/ Potassium Chloride 20 meq/ 

Total Parenteral Nutrition/Amino Acids/Dextrose/ Fat Emulsion Intravenous 1,400 

ml @  58.333 mls/ hr TPN  CONT IV  Last administered on 4/17/20at 22:59;  Start 

4/17/20 at 22:00;  Stop 4/18/20 at 21:59;  Status DC


Sodium Chloride 1,000 ml @  1,000 mls/hr Q1H PRN IV hypotension;  Start 4/18/20 

at 08:27;  Stop 4/18/20 at 14:26;  Status DC


Albumin Human 200 ml @  200 mls/hr 1X PRN  PRN IV Hypotension Last administered 

on 4/18/20at 09:18;  Start 4/18/20 at 08:30;  Stop 4/18/20 at 14:29;  Status DC


Sodium Chloride 1,000 ml @  400 mls/hr Q2H30M PRN IV PATENCY;  Start 4/18/20 at 

08:27;  Stop 4/18/20 at 20:26;  Status DC


Info (PHARMACY MONITORING -- do not chart) 1 each PRN DAILY  PRN MC SEE 

COMMENTS;  Start 4/18/20 at 08:30;  Status Cancel


Info (PHARMACY MONITORING -- do not chart) 1 each PRN DAILY  PRN MC SEE 

COMMENTS;  Start 4/18/20 at 08:30;  Stop 4/26/20 at 13:10;  Status DC


Sodium Chloride 100 meq/Potassium Chloride 40 meq/ Magnesium Sulfate 15 

meq/Calcium Gluconate 15 meq/ Multivitamins 10 ml/Chromium/ Copper/Manganese/ 

Seleni/Zn 0.5 ml/ Insulin Human Regular 35 unit/ Total Parenteral 

Nutrition/Amino Acids/Dextrose/ Fat Emulsion Intravenous 1,400 ml @  58.333 mls/

hr TPN  CONT IV  Last administered on 4/18/20at 22:00;  Start 4/18/20 at 22:00; 

Stop 4/19/20 at 21:59;  Status DC


Potassium Chloride/Water 100 ml @  100 mls/hr 1X  ONCE IV  Last administered on 

4/18/20at 17:28;  Start 4/18/20 at 14:45;  Stop 4/18/20 at 15:44;  Status DC


Sodium Chloride 100 meq/Potassium Chloride 40 meq/ Magnesium Sulfate 15 

meq/Calcium Gluconate 15 meq/ Multivitamins 10 ml/Chromium/ Copper/Manganese/ 

Seleni/Zn 0.5 ml/ Insulin Human Regular 35 unit/ Total Parenteral 

Nutrition/Amino Acids/Dextrose/ Fat Emulsion Intravenous 1,400 ml @  58.333 mls/

hr TPN  CONT IV  Last administered on 4/19/20at 22:46;  Start 4/19/20 at 22:00; 

Stop 4/20/20 at 21:59;  Status DC


Sodium Chloride 100 meq/Potassium Chloride 40 meq/ Magnesium Sulfate 20 

meq/Calcium Gluconate 15 meq/ Multivitamins 10 ml/Chromium/ Copper/Manganese/ 

Seleni/Zn 0.5 ml/ Insulin Human Regular 35 unit/ Total Parenteral Nutrition

/Amino Acids/Dextrose/ Fat Emulsion Intravenous 1,400 ml @  58.333 mls/ hr TPN  

CONT IV  Last administered on 4/20/20at 22:31;  Start 4/20/20 at 22:00;  Stop 

4/21/20 at 21:59;  Status DC


Fentanyl Citrate (Fentanyl 2ml Vial) 50 mcg PRN Q2HR  PRN IVP PAIN Last 

administered on 4/27/20at 13:32;  Start 4/20/20 at 21:00;  Stop 4/28/20 at 

12:53;  Status DC


Fentanyl Citrate (Fentanyl 2ml Vial) 25 mcg PRN Q2HR  PRN IVP PAIN;  Start 

4/20/20 at 21:00;  Stop 4/28/20 at 12:54;  Status DC


Enoxaparin Sodium (Lovenox 100mg Syringe) 100 mg Q12HR SQ ;  Start 4/21/20 at 

21:00;  Status UNV


Amino Acids/ Glycerin/ Electrolytes 1,000 ml @  75 mls/hr [id number] IV ;  Start 

4/20/20 at 21:15;  Status UNV


Sodium Chloride 1,000 ml @  1,000 mls/hr Q1H PRN IV hypotension;  Start 4/21/20 

at 07:56;  Stop 4/21/20 at 13:55;  Status DC


Albumin Human 200 ml @  200 mls/hr 1X PRN  PRN IV Hypotension Last administered 

on 4/21/20at 08:40;  Start 4/21/20 at 08:00;  Stop 4/21/20 at 13:59;  Status DC


Sodium Chloride 1,000 ml @  400 mls/hr Q2H30M PRN IV PATENCY;  Start 4/21/20 at 

07:56;  Stop 4/21/20 at 19:55;  Status DC


Info (PHARMACY MONITORING -- do not chart) 1 each PRN DAILY  PRN MC SEE 

COMMENTS;  Start 4/21/20 at 08:00;  Status UNV


Info (PHARMACY MONITORING -- do not chart) 1 each PRN DAILY  PRN MC SEE 

COMMENTS;  Start 4/21/20 at 08:00;  Status UNV


Daptomycin 430 mg/ Sodium Chloride 50 ml @  100 mls/hr Q24H IV  Last administe

red on 4/21/20at 12:35;  Start 4/21/20 at 09:00;  Stop 4/21/20 at 12:49;  Status

DC


Sodium Chloride 100 meq/Potassium Chloride 40 meq/ Magnesium Sulfate 20 

meq/Calcium Gluconate 15 meq/ Multivitamins 10 ml/Chromium/ Copper/Manganese/ 

Seleni/Zn 0.5 ml/ Insulin Human Regular 35 unit/ Total Parenteral 

Nutrition/Amino Acids/Dextrose/ Fat Emulsion Intravenous 1,400 ml @  58.333 mls/

hr TPN  CONT IV  Last administered on 4/21/20at 21:26;  Start 4/21/20 at 22:00; 

Stop 4/22/20 at 21:59;  Status DC


Daptomycin 430 mg/ Sodium Chloride 50 ml @  100 mls/hr Q48H IV ;  Start 4/23/20 

at 09:00;  Stop 4/22/20 at 11:55;  Status DC


Sodium Chloride 100 meq/Potassium Chloride 40 meq/ Magnesium Sulfate 20 

meq/Calcium Gluconate 15 meq/ Multivitamins 10 ml/Chromium/ Copper/Manganese/ 

Seleni/Zn 0.5 ml/ Insulin Human Regular 35 unit/ Total Parenteral 

Nutrition/Amino Acids/Dextrose/ Fat Emulsion Intravenous 1,400 ml @  58.333 mls/

hr TPN  CONT IV  Last administered on 4/22/20at 22:27;  Start 4/22/20 at 22:00; 

Stop 4/23/20 at 21:59;  Status DC


Daptomycin 430 mg/ Sodium Chloride 50 ml @  100 mls/hr Q24H IV  Last 

administered on 4/24/20at 15:07;  Start 4/22/20 at 13:00;  Stop 4/25/20 at 

13:15;  Status DC


Sodium Chloride 100 meq/Potassium Chloride 40 meq/ Magnesium Sulfate 20 

meq/Calcium Gluconate 10 meq/ Multivitamins 10 ml/Chromium/ Copper/Manganese/ 

Seleni/Zn 0.5 ml/ Insulin Human Regular 35 unit/ Total Parenteral 

Nutrition/Amino Acids/Dextrose/ Fat Emulsion Intravenous 1,400 ml @  58.333 mls/

hr TPN  CONT IV  Last administered on 4/24/20at 00:06;  Start 4/23/20 at 22:00; 

Stop 4/24/20 at 21:59;  Status DC


Alteplase, Recombinant (Cathflo For Central Catheter Clearance) 1 mg 1X  ONCE 

INT CAT  Last administered on 4/24/20at 11:44;  Start 4/24/20 at 10:45;  Stop 

4/24/20 at 10:46;  Status DC


Ondansetron HCl (Zofran) 4 mg PRN Q6HRS  PRN IV NAUSEA/VOMITING;  Start 4/27/20 

at 07:00;  Stop 4/28/20 at 06:59;  Status DC


Fentanyl Citrate (Fentanyl 2ml Vial) 25 mcg PRN Q5MIN  PRN IV MILD PAIN 1-3;  

Start 4/27/20 at 07:00;  Stop 4/28/20 at 06:59;  Status DC


Fentanyl Citrate (Fentanyl 2ml Vial) 50 mcg PRN Q5MIN  PRN IV MODERATE TO SEVERE

PAIN Last administered on 4/27/20at 10:17;  Start 4/27/20 at 07:00;  Stop 

4/28/20 at 06:59;  Status DC


Ringer's Solution 1,000 ml @  30 mls/hr Q24H IV ;  Start 4/27/20 at 07:00;  Stop

4/27/20 at 18:59;  Status DC


Lidocaine HCl (Xylocaine-Mpf 1% 2ml Vial) 2 ml PRN 1X  PRN ID PRIOR TO IV START;

 Start 4/27/20 at 07:00;  Stop 4/28/20 at 06:59;  Status DC


Prochlorperazine Edisylate (Compazine) 5 mg PACU PRN  PRN IV NAUSEA, MRX1;  

Start 4/27/20 at 07:00;  Stop 4/28/20 at 06:59;  Status DC


Sodium Acetate 50 meq/Potassium Acetate 55 meq/ Magnesium Sulfate 20 meq/Calcium

Gluconate 10 meq/ Multivitamins 10 ml/Chromium/ Copper/Manganese/ Seleni/Zn 0.5 

ml/ Insulin Human Regular 35 unit/ Total Parenteral Nutrition/Amino 

Acids/Dextrose/ Fat Emulsion Intravenous 1,400 ml @  58.333 mls/ hr TPN  CONT IV

;  Start 4/24/20 at 22:00;  Stop 4/24/20 at 14:15;  Status DC


Sodium Acetate 50 meq/Potassium Acetate 55 meq/ Magnesium Sulfate 20 meq/Calcium

Gluconate 10 meq/ Multivitamins 10 ml/Chromium/ Copper/Manganese/ Seleni/Zn 0.5 

ml/ Insulin Human Regular 35 unit/ Total Parenteral Nutrition/Amino 

Acids/Dextrose/ Fat Emulsion Intravenous 1,800 ml @  75 mls/hr TPN  CONT IV  

Last administered on 4/24/20at 22:38;  Start 4/24/20 at 22:00;  Stop 4/25/20 at 

21:59;  Status DC


Sodium Chloride 1,000 ml @  1,000 mls/hr Q1H PRN IV hypotension;  Start 4/24/20 

at 15:31;  Stop 4/24/20 at 21:30;  Status DC


Diphenhydramine HCl (Benadryl) 25 mg 1X PRN  PRN IV ITCHING;  Start 4/24/20 at 

15:45;  Stop 4/25/20 at 15:44;  Status DC


Diphenhydramine HCl (Benadryl) 25 mg 1X PRN  PRN IV ITCHING;  Start 4/24/20 at 

15:45;  Stop 4/25/20 at 15:44;  Status DC


Sodium Chloride 1,000 ml @  400 mls/hr Q2H30M PRN IV PATENCY;  Start 4/24/20 at 

15:31;  Stop 4/25/20 at 03:30;  Status DC


Info (PHARMACY MONITORING -- do not chart) 1 each PRN DAILY  PRN MC SEE COMMENT

S;  Start 4/24/20 at 15:45;  Stop 5/26/20 at 14:14;  Status DC


Sodium Acetate 50 meq/Potassium Acetate 55 meq/ Magnesium Sulfate 20 meq/Calcium

Gluconate 10 meq/ Multivitamins 10 ml/Chromium/ Copper/Manganese/ Seleni/Zn 0.5 

ml/ Insulin Human Regular 35 unit/ Total Parenteral Nutrition/Amino 

Acids/Dextrose/ Fat Emulsion Intravenous 1,800 ml @  75 mls/hr TPN  CONT IV  

Last administered on 4/25/20at 22:03;  Start 4/25/20 at 22:00;  Stop 4/26/20 at 

21:59;  Status DC


Daptomycin 430 mg/ Sodium Chloride 50 ml @  100 mls/hr Q24H IV  Last 

administered on 4/30/20at 13:00;  Start 4/25/20 at 13:00;  Stop 4/30/20 at 

20:58;  Status DC


Heparin Sodium (Porcine) 1000 unit/Sodium Chloride 1,001 ml @  1,001 mls/hr 1X  

ONCE IRR ;  Start 4/27/20 at 06:00;  Stop 4/27/20 at 06:59;  Status DC


Potassium Acetate 55 meq/Magnesium Sulfate 20 meq/ Calcium Gluconate 10 meq/ 

Multivitamins 10 ml/Chromium/ Copper/Manganese/ Seleni/Zn 0.5 ml/ Insulin Human 

Regular 35 unit/ Total Parenteral Nutrition/Amino Acids/Dextrose/ Fat Emulsion 

Intravenous 1,920 ml @  80 mls/hr TPN  CONT IV  Last administered on 4/26/20at 

22:10;  Start 4/26/20 at 22:00;  Stop 4/27/20 at 21:59;  Status DC


Dexamethasone Sodium Phosphate (Decadron) 4 mg STK-MED ONCE .ROUTE ;  Start 

4/27/20 at 10:56;  Stop 4/27/20 at 10:57;  Status DC


Ondansetron HCl (Zofran) 4 mg STK-MED ONCE .ROUTE ;  Start 4/27/20 at 10:56;  

Stop 4/27/20 at 10:57;  Status DC


Rocuronium Bromide (Zemuron) 50 mg STK-MED ONCE .ROUTE ;  Start 4/27/20 at 

10:56;  Stop 4/27/20 at 10:57;  Status DC


Fentanyl Citrate (Fentanyl 2ml Vial) 100 mcg STK-MED ONCE .ROUTE ;  Start 

4/27/20 at 10:56;  Stop 4/27/20 at 10:57;  Status DC


Bupivacaine HCl/ Epinephrine Bitart (Sensorcain-Epi 0.5%-1:[id number] Mpf) 30 ml 

STK-MED ONCE .ROUTE  Last administered on 4/27/20at 12:01;  Start 4/27/20 at 

10:58;  Stop 4/27/20 at 10:58;  Status DC


Cellulose (Surgicel Hemostat 2x14) 1 each STK-MED ONCE .ROUTE ;  Start 4/27/20 

at 10:58;  Stop 4/27/20 at 10:59;  Status DC


Iohexol (Omnipaque 300 Mg/ml) 50 ml STK-MED ONCE .ROUTE ;  Start 4/27/20 at 

10:58;  Stop 4/27/20 at 10:59;  Status DC


Cellulose (Surgicel Hemostat 4x8) 1 each STK-MED ONCE .ROUTE ;  Start 4/27/20 at

10:58;  Stop 4/27/20 at 10:59;  Status DC


Bisacodyl (Dulcolax Supp) 10 mg STK-MED ONCE .ROUTE ;  Start 4/27/20 at 10:59;  

Stop 4/27/20 at 10:59;  Status DC


Heparin Sodium (Porcine) 1000 unit/Sodium Chloride 1,001 ml @  1,001 mls/hr 1X  

ONCE IRR ;  Start 4/27/20 at 12:00;  Stop 4/27/20 at 12:59;  Status DC


Propofol 20 ml @ As Directed STK-MED ONCE IV ;  Start 4/27/20 at 11:05;  Stop 

4/27/20 at 11:05;  Status DC


Sevoflurane (Ultane) 90 ml STK-MED ONCE IH ;  Start 4/27/20 at 11:05;  Stop 

4/27/20 at 11:05;  Status DC


Sevoflurane (Ultane) 60 ml STK-MED ONCE IH ;  Start 4/27/20 at 12:26;  Stop 

4/27/20 at 12:27;  Status DC


Propofol 20 ml @ As Directed STK-MED ONCE IV ;  Start 4/27/20 at 12:26;  Stop 

4/27/20 at 12:27;  Status DC


Phenylephrine HCl (PHENYLEPHRINE in 0.9% NACL PF) 1 mg STK-MED ONCE IV ;  Start 

4/27/20 at 12:34;  Stop 4/27/20 at 12:34;  Status DC


Heparin Sodium (Porcine) (Heparin Sodium) 5,000 unit Q12HR SQ  Last administered

on 5/6/20at 20:57;  Start 4/27/20 at 21:00;  Stop 5/7/20 at 09:59;  Status DC


Sodium Chloride (Normal Saline Flush) 3 ml QSHIFT  PRN IV AFTER MEDS AND BLOOD 

DRAWS;  Start 4/27/20 at 13:45


Naloxone HCl (Narcan) 0.4 mg PRN Q2MIN  PRN IV SEE INSTRUCTIONS Last 

administered on 6/6/20at 15:15;  Start 4/27/20 at 13:45


Sodium Chloride 1,000 ml @  25 mls/hr Q24H IV  Last administered on 5/26/20at 

13:37;  Start 4/27/20 at 13:37;  Stop 5/29/20 at 13:09;  Status DC


Naloxone HCl (Narcan) 0.4 mg PRN Q2MIN  PRN IV SEE INSTRUCTIONS;  Start 4/27/20 

at 14:30;  Status UNV


Sodium Chloride 1,000 ml @  25 mls/hr Q24H IV ;  Start 4/27/20 at 14:30;  Status

UNV


Hydromorphone HCl 30 ml @ 0 mls/hr CONT PRN  PRN IV PER PROTOCOL Last 

administered on 5/2/20at 16:08;  Start 4/27/20 at 14:30;  Stop 5/4/20 at 08:55; 

Status DC


Potassium Acetate 55 meq/Magnesium Sulfate 20 meq/ Calcium Gluconate 10 meq/ 

Multivitamins 10 ml/Chromium/ Copper/Manganese/ Seleni/Zn 0.5 ml/ Insulin Human 

Regular 35 unit/ Total Parenteral Nutrition/Amino Acids/Dextrose/ Fat Emulsion 

Intravenous 1,920 ml @  80 mls/hr TPN  CONT IV  Last administered on 4/27/20at 

22:01;  Start 4/27/20 at 22:00;  Stop 4/28/20 at 21:59;  Status DC


Bumetanide (Bumex) 2 mg [id number] IV  Last administered on 5/1/20at 13:50;  Start 

4/28/20 at 14:00;  Stop 5/2/20 at 14:10;  Status DC


Meropenem 1 gm/ Sodium Chloride 100 ml @  200 mls/hr Q8HRS IV  Last administered

on 5/22/20at 05:53;  Start 4/28/20 at 14:00;  Stop 5/22/20 at 09:31;  Status DC


Potassium Acetate 55 meq/Magnesium Sulfate 20 meq/ Calcium Gluconate 10 meq/ 

Multivitamins 10 ml/Chromium/ Copper/Manganese/ Seleni/Zn 0.5 ml/ Insulin Human 

Regular 35 unit/ Total Parenteral Nutrition/Amino Acids/Dextrose/ Fat Emulsion 

Intravenous 1,920 ml @  80 mls/hr TPN  CONT IV  Last administered on 4/28/20at 

22:02;  Start 4/28/20 at 22:00;  Stop 4/29/20 at 21:59;  Status DC


Hydromorphone HCl (Dilaudid Standard PCA) 12 mg STK-MED ONCE IV ;  Start 4/27/20

at 14:35;  Stop 4/28/20 at 13:53;  Status DC


Artificial Tears (Artificial Tears) 1 drop PRN Q15MIN  PRN OU DRY EYE Last 

administered on 6/15/20at 03:38;  Start 4/29/20 at 05:30


Hydromorphone HCl (Dilaudid Standard PCA) 12 mg STK-MED ONCE IV ;  Start 4/28/20

at 12:05;  Stop 4/29/20 at 09:15;  Status DC


Potassium Acetate 65 meq/Magnesium Sulfate 20 meq/ Calcium Gluconate 10 meq/ 

Multivitamins 10 ml/Chromium/ Copper/Manganese/ Seleni/Zn 0.5 ml/ Insulin Human 

Regular 30 unit/ Total Parenteral Nutrition/Amino Acids/Dextrose/ Fat Emulsion 

Intravenous 1,920 ml @  80 mls/hr TPN  CONT IV  Last administered on 4/29/20at 

22:22;  Start 4/29/20 at 22:00;  Stop 4/30/20 at 21:59;  Status DC


Cyclobenzaprine HCl (Flexeril) 10 mg PRN Q6HRS  PRN PO MUSCLE SPASMS;  Start 

4/30/20 at 10:45


Potassium Acetate 55 meq/Magnesium Sulfate 20 meq/ Calcium Gluconate 10 meq/ 

Multivitamins 10 ml/Chromium/ Copper/Manganese/ Seleni/Zn 0.5 ml/ Insulin Human 

Regular 30 unit/ Total Parenteral Nutrition/Amino Acids/Dextrose/ Fat Emulsion 

Intravenous 1,920 ml @  80 mls/hr TPN  CONT IV  Last administered on 5/1/20at 

01:00;  Start 4/30/20 at 22:00;  Stop 5/1/20 at 21:59;  Status DC


Magnesium Sulfate 50 ml @ 25 mls/hr 1X  ONCE IV  Last administered on 4/30/20at 

17:18;  Start 4/30/20 at 12:45;  Stop 4/30/20 at 14:44;  Status DC


Potassium Chloride/Water 100 ml @  100 mls/hr 1X  ONCE IV  Last administered on 

5/1/20at 11:27;  Start 5/1/20 at 12:00;  Stop 5/1/20 at 12:59;  Status DC


Hydromorphone HCl (Dilaudid Standard PCA) 12 mg STK-MED ONCE IV ;  Start 4/29/20

at 10:50;  Stop 5/1/20 at 11:02;  Status DC


Hydromorphone HCl (Dilaudid Standard PCA) 12 mg STK-MED ONCE IV ;  Start 4/30/20

at 13:47;  Stop 5/1/20 at 11:03;  Status DC


Potassium Acetate 30 meq/Magnesium Sulfate 20 meq/ Calcium Gluconate 10 meq/ 

Multivitamins 10 ml/Chromium/ Copper/Manganese/ Seleni/Zn 0.5 ml/ Insulin Human 

Regular 30 unit/ Potassium Chloride 30 meq/ Total Parenteral Nutrition/Amino 

Acids/Dextrose/ Fat Emulsion Intravenous 1,920 ml @  80 mls/hr TPN  CONT IV  

Last administered on 5/1/20at 22:34;  Start 5/1/20 at 22:00;  Stop 5/2/20 at 

21:59;  Status DC


Potassium Chloride/Water 100 ml @  100 mls/hr Q1H IV  Last administered on 

5/2/20at 13:05;  Start 5/2/20 at 07:00;  Stop 5/2/20 at 10:59;  Status DC


Magnesium Sulfate 50 ml @ 25 mls/hr 1X  ONCE IV  Last administered on 5/2/20at 

10:34;  Start 5/2/20 at 10:30;  Stop 5/2/20 at 12:29;  Status DC


Potassium Chloride 75 meq/ Magnesium Sulfate 20 meq/Calcium Gluconate 10 meq/ 

Multivitamins 10 ml/Chromium/ Copper/Manganese/ Seleni/Zn 0.5 ml/ Insulin Human 

Regular 30 unit/ Total Parenteral Nutrition/Amino Acids/Dextrose/ Fat Emulsion 

Intravenous 1,920 ml @  80 mls/hr TPN  CONT IV  Last administered on 5/2/20at 

21:51;  Start 5/2/20 at 22:00;  Stop 5/3/20 at 22:00;  Status DC


Potassium Chloride 75 meq/ Magnesium Sulfate 20 meq/Calcium Gluconate 10 meq/ 

Multivitamins 10 ml/Chromium/ Copper/Manganese/ Seleni/Zn 0.5 ml/ Insulin Human 

Regular 25 unit/ Total Parenteral Nutrition/Amino Acids/Dextrose/ Fat Emulsion 

Intravenous 1,920 ml @  80 mls/hr TPN  CONT IV  Last administered on 5/3/20at 

22:04;  Start 5/3/20 at 22:00;  Stop 5/4/20 at 21:59;  Status DC


Hydromorphone HCl (Dilaudid) 0.4 mg PRN Q4HRS  PRN IVP PAIN Last administered on

5/4/20at 10:57;  Start 5/4/20 at 09:00;  Stop 5/4/20 at 18:59;  Status DC


Micafungin Sodium 100 mg/Dextrose 100 ml @  100 mls/hr Q24H IV  Last ad

ministered on 5/26/20at 12:17;  Start 5/4/20 at 11:00;  Stop 5/27/20 at 09:59;  

Status DC


Daptomycin 485 mg/ Sodium Chloride 50 ml @  100 mls/hr Q24H IV  Last 

administered on 5/11/20at 13:10;  Start 5/4/20 at 11:00;  Stop 5/12/20 at 07:44;

 Status DC


Potassium Chloride 75 meq/ Magnesium Sulfate 15 meq/Calcium Gluconate 8 meq/ 

Multivitamins 10 ml/Chromium/ Copper/Manganese/ Seleni/Zn 0.5 ml/ Insulin Human 

Regular 25 unit/ Total Parenteral Nutrition/Amino Acids/Dextrose/ Fat Emulsion 

Intravenous 1,920 ml @  80 mls/hr TPN  CONT IV  Last administered on 5/4/20at 

23:08;  Start 5/4/20 at 22:00;  Stop 5/5/20 at 21:59;  Status DC


Haloperidol Lactate (Haldol Inj) 3 mg 1X  ONCE IVP  Last administered on 

5/4/20at 14:37;  Start 5/4/20 at 14:30;  Stop 5/4/20 at 14:31;  Status DC


Hydromorphone HCl (Dilaudid) 1 mg PRN Q4HRS  PRN IVP PAIN Last administered on 

5/18/20at 06:25;  Start 5/4/20 at 19:00;  Stop 5/18/20 at 17:10;  Status DC


Potassium Chloride 75 meq/ Magnesium Sulfate 15 meq/Calcium Gluconate 8 meq/ 

Multivitamins 10 ml/Chromium/ Copper/Manganese/ Seleni/Zn 0.5 ml/ Insulin Human 

Regular 20 unit/ Total Parenteral Nutrition/Amino Acids/Dextrose/ Fat Emulsion 

Intravenous 1,920 ml @  80 mls/hr TPN  CONT IV  Last administered on 5/5/20at 

22:10;  Start 5/5/20 at 22:00;  Stop 5/6/20 at 21:59;  Status DC


Lidocaine HCl (Buffered Lidocaine 1%) 3 ml STK-MED ONCE .ROUTE ;  Start 5/6/20 

at 11:31;  Stop 5/6/20 at 11:31;  Status DC


Lidocaine HCl (Buffered Lidocaine 1%) 3 ml STK-MED ONCE .ROUTE ;  Start 5/6/20 

at 12:28;  Stop 5/6/20 at 12:29;  Status DC


Lidocaine HCl (Buffered Lidocaine 1%) 6 ml 1X  ONCE INJ  Last administered on 

5/6/20at 12:53;  Start 5/6/20 at 12:45;  Stop 5/6/20 at 12:46;  Status DC


Potassium Chloride 75 meq/ Magnesium Sulfate 15 meq/Calcium Gluconate 8 meq/ 

Multivitamins 10 ml/Chromium/ Copper/Manganese/ Seleni/Zn 0.5 ml/ Insulin Human 

Regular 20 unit/ Total Parenteral Nutrition/Amino Acids/Dextrose/ Fat Emulsion 

Intravenous 1,920 ml @  80 mls/hr TPN  CONT IV  Last administered on 5/6/20at 

22:00;  Start 5/6/20 at 22:00;  Stop 5/7/20 at 21:59;  Status DC


Potassium Chloride 75 meq/ Magnesium Sulfate 15 meq/Calcium Gluconate 8 meq/ 

Multivitamins 10 ml/Chromium/ Copper/Manganese/ Seleni/Zn 0.5 ml/ Insulin Human 

Regular 15 unit/ Total Parenteral Nutrition/Amino Acids/Dextrose/ Fat Emulsion 

Intravenous 1,920 ml @  80 mls/hr TPN  CONT IV  Last administered on 5/7/20at 

22:28;  Start 5/7/20 at 22:00;  Stop 5/8/20 at 21:59;  Status DC


Vecuronium Bromide (Norcuron Bolus) 6 mg PRN Q6HRS  PRN IV VENT ASYNCHRONY;  

Start 5/7/20 at 19:15;  Stop 5/7/20 at 19:35;  Status DC


Bumetanide (Bumex) 2 mg 1X  ONCE IV  Last administered on 5/7/20at 22:09;  Start

5/7/20 at 19:45;  Stop 5/7/20 at 19:46;  Status DC


Lidocaine HCl (Buffered Lidocaine 1%) 3 ml STK-MED ONCE .ROUTE ;  Start 5/8/20 

at 07:59;  Stop 5/8/20 at 07:59;  Status DC


Midazolam HCl (Versed) 5 mg STK-MED ONCE .ROUTE ;  Start 5/8/20 at 08:36;  Stop 

5/8/20 at 08:36;  Status DC


Fentanyl Citrate (Fentanyl 5ml Vial) 250 mcg STK-MED ONCE .ROUTE ;  Start 5/8/20

at 08:36;  Stop 5/8/20 at 08:37;  Status DC


Lidocaine HCl (Buffered Lidocaine 1%) 3 ml 1X  ONCE IJ  Last administered on 

5/8/20at 09:30;  Start 5/8/20 at 09:15;  Stop 5/8/20 at 09:16;  Status DC


Midazolam HCl (Versed) 5 mg 1X  ONCE IV  Last administered on 5/8/20at 09:30;  

Start 5/8/20 at 09:15;  Stop 5/8/20 at 09:16;  Status DC


Fentanyl Citrate (Fentanyl 5ml Vial) 250 mcg 1X  ONCE IV  Last administered on 5 /8/20at 09:30;  Start 5/8/20 at 09:15;  Stop 5/8/20 at 09:16;  Status DC


Bumetanide (Bumex) 2 mg DAILY IV  Last administered on 5/18/20at 08:07;  Start 

5/8/20 at 10:00;  Stop 5/18/20 at 17:15;  Status DC


Potassium Chloride 75 meq/ Magnesium Sulfate 15 meq/ Multivitamins 10 

ml/Chromium/ Copper/Manganese/ Seleni/Zn 0.5 ml/ Insulin Human Regular 15 unit/ 

Total Parenteral Nutrition/Amino Acids/Dextrose/ Fat Emulsion Intravenous 1,920 

ml @  80 mls/hr TPN  CONT IV  Last administered on 5/8/20at 21:59;  Start 5/8/20

at 22:00;  Stop 5/9/20 at 21:59;  Status DC


Metoclopramide HCl (Reglan Vial) 10 mg PRN Q3HRS  PRN IVP NAUSEA/VOMITING-3rd 

choice Last administered on 5/14/20at 04:25;  Start 5/9/20 at 16:45


Potassium Chloride 75 meq/ Magnesium Sulfate 15 meq/ Multivitamins 10 

ml/Chromium/ Copper/Manganese/ Seleni/Zn 0.5 ml/ Insulin Human Regular 15 unit/ 

Total Parenteral Nutrition/Amino Acids/Dextrose/ Fat Emulsion Intravenous 1,920 

ml @  80 mls/hr TPN  CONT IV  Last administered on 5/9/20at 22:41;  Start 5/9/20

at 22:00;  Stop 5/10/20 at 21:59;  Status DC


Magnesium Sulfate 50 ml @ 25 mls/hr 1X  ONCE IV  Last administered on 5/10/20at 

10:44;  Start 5/10/20 at 09:00;  Stop 5/10/20 at 10:59;  Status DC


Potassium Chloride/Water 100 ml @  100 mls/hr 1X  ONCE IV  Last administered on 

5/10/20at 09:37;  Start 5/10/20 at 09:00;  Stop 5/10/20 at 09:59;  Status DC


Duloxetine HCl (Cymbalta) 30 mg DAILY PO  Last administered on 5/11/20at 09:48; 

Start 5/10/20 at 14:00;  Stop 5/13/20 at 10:25;  Status DC


Potassium Chloride 80 meq/ Magnesium Sulfate 20 meq/ Multivitamins 10 

ml/Chromium/ Copper/Manganese/ Seleni/Zn 0.5 ml/ Insulin Human Regular 15 unit/ 

Total Parenteral Nutrition/Amino Acids/Dextrose/ Fat Emulsion Intravenous 1,920 

ml @  80 mls/hr TPN  CONT IV  Last administered on 5/10/20at 21:42;  Start 

5/10/20 at 22:00;  Stop 5/11/20 at 21:59;  Status DC


Potassium Chloride 80 meq/ Magnesium Sulfate 20 meq/ Multivitamins 10 

ml/Chromium/ Copper/Manganese/ Seleni/Zn 0.5 ml/ Insulin Human Regular 15 unit/ 

Total Parenteral Nutrition/Amino Acids/Dextrose/ Fat Emulsion Intravenous 1,920 

ml @  80 mls/hr TPN  CONT IV  Last administered on 5/11/20at 22:20;  Start 

5/11/20 at 22:00;  Stop 5/12/20 at 21:59;  Status DC


Lidocaine HCl (Buffered Lidocaine 1%) 3 ml STK-MED ONCE .ROUTE ;  Start 5/12/20 

at 09:54;  Stop 5/12/20 at 09:55;  Status DC


Hydromorphone HCl (Dilaudid Standard PCA) 12 mg STK-MED ONCE IV ;  Start 5/1/20 

at 15:50;  Stop 5/12/20 at 11:24;  Status DC


Potassium Chloride 80 meq/ Magnesium Sulfate 20 meq/ Multivitamins 10 

ml/Chromium/ Copper/Manganese/ Seleni/Zn 0.5 ml/ Insulin Human Regular 15 unit/ 

Total Parenteral Nutrition/Amino Acids/Dextrose/ Fat Emulsion Intravenous 1,920 

ml @  80 mls/hr TPN  CONT IV  Last administered on 5/12/20at 21:40;  Start 

5/12/20 at 22:00;  Stop 5/13/20 at 21:59;  Status DC


Lidocaine HCl (Buffered Lidocaine 1%) 6 ml 1X  ONCE INJ  Last administered on 

5/12/20at 14:15;  Start 5/12/20 at 14:15;  Stop 5/12/20 at 14:16;  Status DC


Potassium Chloride 80 meq/ Magnesium Sulfate 20 meq/ Multivitamins 10 

ml/Chromium/ Copper/Manganese/ Seleni/Zn 1 ml/ Insulin Human Regular 15 unit/ 

Total Parenteral Nutrition/Amino Acids/Dextrose/ Fat Emulsion Intravenous 1,920 

ml @  80 mls/hr TPN  CONT IV  Last administered on 5/13/20at 22:04;  Start 

5/13/20 at 22:00;  Stop 5/14/20 at 21:59;  Status DC


Potassium Chloride/Water 100 ml @  100 mls/hr 1X  ONCE IV  Last administered on 

5/14/20at 11:34;  Start 5/14/20 at 11:00;  Stop 5/14/20 at 11:59;  Status DC


Potassium Chloride 90 meq/ Magnesium Sulfate 20 meq/ Multivitamins 10 

ml/Chromium/ Copper/Manganese/ Seleni/Zn 1 ml/ Insulin Human Regular 15 unit/ 

Total Parenteral Nutrition/Amino Acids/Dextrose/ Fat Emulsion Intravenous 1,920 

ml @  80 mls/hr TPN  CONT IV  Last administered on 5/14/20at 22:57;  Start 

5/14/20 at 22:00;  Stop 5/15/20 at 21:59;  Status DC


Potassium Chloride 90 meq/ Magnesium Sulfate 20 meq/ Multivitamins 10 

ml/Chromium/ Copper/Manganese/ Seleni/Zn 1 ml/ Insulin Human Regular 15 unit/ 

Total Parenteral Nutrition/Amino Acids/Dextrose/ Fat Emulsion Intravenous 1,920 

ml @  80 mls/hr TPN  CONT IV  Last administered on 5/15/20at 22:48;  Start 

5/15/20 at 22:00;  Stop 5/16/20 at 21:59;  Status DC


Potassium Chloride 90 meq/ Magnesium Sulfate 20 meq/ Multivitamins 10 

ml/Chromium/ Copper/Manganese/ Seleni/Zn 1 ml/ Insulin Human Regular 15 unit/ 

Total Parenteral Nutrition/Amino Acids/Dextrose/ Fat Emulsion Intravenous 1,890 

ml @  78.75 mls/ hr TPN  CONT IV  Last administered on 5/16/20at 22:15;  Start 

5/16/20 at 22:00;  Stop 5/17/20 at 21:59;  Status DC


Linezolid/Dextrose 300 ml @  300 mls/hr Q12HR IV  Last administered on 5/19/20at

21:08;  Start 5/17/20 at 09:00;  Stop 5/20/20 at 08:11;  Status DC


Daptomycin 450 mg/ Sodium Chloride 50 ml @  100 mls/hr Q24H IV  Last 

administered on 5/20/20at 09:25;  Start 5/17/20 at 09:00;  Stop 5/21/20 at 08:

30;  Status DC


Potassium Chloride 90 meq/ Magnesium Sulfate 20 meq/ Multivitamins 10 

ml/Chromium/ Copper/Manganese/ Seleni/Zn 1 ml/ Insulin Human Regular 15 unit/ 

Total Parenteral Nutrition/Amino Acids/Dextrose/ Fat Emulsion Intravenous 1,890 

ml @  78.75 mls/ hr TPN  CONT IV  Last administered on 5/17/20at 21:34;  Start 

5/17/20 at 22:00;  Stop 5/18/20 at 21:59;  Status DC


Lorazepam (Ativan Inj) 2 mg STK-MED ONCE .ROUTE ;  Start 5/17/20 at 14:58;  Stop

5/17/20 at 14:58;  Status DC


Metoprolol Tartrate (Lopressor Vial) 5 mg 1X  ONCE IVP  Last administered on 

5/17/20at 15:31;  Start 5/17/20 at 15:15;  Stop 5/17/20 at 15:16;  Status DC


Lorazepam (Ativan Inj) 2 mg 1X  ONCE IVP  Last administered on 5/17/20at 15:30; 

Start 5/17/20 at 15:15;  Stop 5/17/20 at 15:16;  Status DC


Enoxaparin Sodium (Lovenox 40mg Syringe) 40 mg Q24H SQ  Last administered on 

6/5/20at 17:44;  Start 5/17/20 at 17:00;  Stop 6/7/20 at 06:50;  Status DC


Lorazepam (Ativan Inj) 1 mg PRN Q4HRS  PRN IVP ANXIETY / AGITATION MILD-MOD Last

administered on 5/31/20at 15:55;  Start 5/17/20 at 19:15;  Stop 6/2/20 at 11:45;

 Status DC


Lorazepam (Ativan Inj) 2 mg PRN Q4HRS  PRN IVP ANXIETY / AGITATION SEVERE Last 

administered on 6/1/20at 07:55;  Start 5/17/20 at 19:15;  Stop 6/2/20 at 11:45; 

Status DC


Fentanyl Citrate (Fentanyl 2ml Vial) 50 mcg PRN Q4HRS  PRN IVP SEVERE PAIN Last 

administered on 6/13/20at 05:15;  Start 5/18/20 at 13:15;  Stop 6/14/20 at 

09:29;  Status DC


Fentanyl Citrate (Fentanyl 2ml Vial) 25 mcg PRN Q4HRS  PRN IVP MODERATE PAIN 

Last administered on 6/13/20at 00:27;  Start 5/18/20 at 13:15;  Stop 6/14/20 at 

09:30;  Status DC


Potassium Chloride 90 meq/ Magnesium Sulfate 20 meq/ Multivitamins 10 

ml/Chromium/ Copper/Manganese/ Seleni/Zn 1 ml/ Insulin Human Regular 15 unit/ 

Total Parenteral Nutrition/Amino Acids/Dextrose/ Fat Emulsion Intravenous 1,890 

ml @  78.75 mls/ hr TPN  CONT IV  Last administered on 5/18/20at 22:18;  Start 

5/18/20 at 22:00;  Stop 5/19/20 at 21:59;  Status DC


Furosemide (Lasix) 40 mg 1X  ONCE IVP  Last administered on 5/18/20at 21:51;  

Start 5/18/20 at 21:45;  Stop 5/18/20 at 21:48;  Status DC


Albumin Human 100 ml @  100 mls/hr 1X PRN  PRN IV SEE COMMENTS;  Start 5/19/20 

at 01:30


Furosemide (Lasix) 40 mg BID92 IVP  Last administered on 6/3/20at 08:04;  Start 

5/19/20 at 14:00;  Stop 6/3/20 at 13:07;  Status DC


Potassium Chloride 90 meq/ Magnesium Sulfate 20 meq/ Multivitamins 10 

ml/Chromium/ Copper/Manganese/ Seleni/Zn 1 ml/ Insulin Human Regular 15 unit/ 

Total Parenteral Nutrition/Amino Acids/Dextrose/ Fat Emulsion Intravenous 1,800 

ml @  75 mls/hr TPN  CONT IV  Last administered on 5/19/20at 22:31;  Start 

5/19/20 at 22:00;  Stop 5/20/20 at 21:59;  Status DC


Potassium Chloride 90 meq/ Magnesium Sulfate 20 meq/ Multivitamins 10 

ml/Chromium/ Copper/Manganese/ Seleni/Zn 1 ml/ Insulin Human Regular 15 unit/ 

Total Parenteral Nutrition/Amino Acids/Dextrose/ Fat Emulsion Intravenous 1,800 

ml @  75 mls/hr TPN  CONT IV  Last administered on 5/20/20at 22:28;  Start 

5/20/20 at 22:00;  Stop 5/21/20 at 21:59;  Status DC


Potassium Chloride 110 meq/ Magnesium Sulfate 20 meq/ Multivitamins 10 

ml/Chromium/ Copper/Manganese/ Seleni/Zn 1 ml/ Insulin Human Regular 15 unit/ 

Total Parenteral Nutrition/Amino Acids/Dextrose/ Fat Emulsion Intravenous 1,800 

ml @  75 mls/hr TPN  CONT IV  Last administered on 5/21/20at 22:01;  Start 

5/21/20 at 22:00;  Stop 5/22/20 at 21:59;  Status DC


Saliva Substitute (Biotene Moisturizing Mouth) 2 spray PRN Q15MIN  PRN PO DRY 

MOUTH;  Start 5/21/20 at 11:00


Potassium Chloride 110 meq/ Magnesium Sulfate 20 meq/ Multivitamins 10 

ml/Chromium/ Copper/Manganese/ Seleni/Zn 1 ml/ Insulin Human Regular 15 unit/ 

Total Parenteral Nutrition/Amino Acids/Dextrose/ Fat Emulsion Intravenous 1,800 

ml @  75 mls/hr TPN  CONT IV  Last administered on 5/22/20at 22:21;  Start 

5/22/20 at 22:00;  Stop 5/23/20 at 21:59;  Status DC


Potassium Chloride 110 meq/ Magnesium Sulfate 20 meq/ Multivitamins 10 

ml/Chromium/ Copper/Manganese/ Seleni/Zn 1 ml/ Insulin Human Regular 15 unit/ 

Total Parenteral Nutrition/Amino Acids/Dextrose/ Fat Emulsion Intravenous 1,800 

ml @  75 mls/hr TPN  CONT IV  Last administered on 5/23/20at 22:04;  Start 

5/23/20 at 22:00;  Stop 5/24/20 at 21:59;  Status DC


Potassium Chloride 110 meq/ Magnesium Sulfate 20 meq/ Multivitamins 10 

ml/Chromium/ Copper/Manganese/ Seleni/Zn 1 ml/ Insulin Human Regular 15 unit/ 

Total Parenteral Nutrition/Amino Acids/Dextrose/ Fat Emulsion Intravenous 1,800 

ml @  75 mls/hr TPN  CONT IV  Last administered on 5/24/20at 22:48;  Start 

5/24/20 at 22:00;  Stop 5/25/20 at 21:59;  Status DC


Potassium Chloride 70 meq/ Magnesium Sulfate 20 meq/ Multivitamins 10 

ml/Chromium/ Copper/Manganese/ Seleni/Zn 1 ml/ Insulin Human Regular 15 unit/ 

Total Parenteral Nutrition/Amino Acids/Dextrose/ Fat Emulsion Intravenous 1,800 

ml @  75 mls/hr TPN  CONT IV  Last administered on 5/25/20at 21:39;  Start 

5/25/20 at 22:00;  Stop 5/26/20 at 21:59;  Status DC


Meropenem 500 mg/ Sodium Chloride 50 ml @  100 mls/hr Q6HRS IV  Last 

administered on 5/27/20at 06:02;  Start 5/25/20 at 18:00;  Stop 5/27/20 at 

09:59;  Status DC


Barium Sulfate (Varibar Thin Liquid Apple) 148 gm 1X  ONCE PO ;  Start 5/26/20 

at 11:45;  Stop 5/26/20 at 11:49;  Status DC


Potassium Chloride 70 meq/ Magnesium Sulfate 20 meq/ Multivitamins 10 

ml/Chromium/ Copper/Manganese/ Seleni/Zn 1 ml/ Insulin Human Regular 15 unit/ 

Total Parenteral Nutrition/Amino Acids/Dextrose/ Fat Emulsion Intravenous 1,800 

ml @  75 mls/hr TPN  CONT IV  Last administered on 5/26/20at 22:27;  Start 

5/26/20 at 22:00;  Stop 5/27/20 at 21:59;  Status DC


Piperacillin Sod/ Tazobactam Sod 3.375 gm/Sodium Chloride 50 ml @  100 mls/hr 

Q6HRS IV  Last administered on 6/4/20at 06:10;  Start 5/27/20 at 12:00;  Stop 

6/4/20 at 07:26;  Status DC


Potassium Chloride 70 meq/ Magnesium Sulfate 20 meq/ Multivitamins 10 

ml/Chromium/ Copper/Manganese/ Seleni/Zn 1 ml/ Insulin Human Regular 15 unit/ 

Total Parenteral Nutrition/Amino Acids/Dextrose/ Fat Emulsion Intravenous 1,800 

ml @  75 mls/hr TPN  CONT IV  Last administered on 5/27/20at 22:03;  Start 

5/27/20 at 22:00;  Stop 5/28/20 at 21:59;  Status DC


Potassium Chloride 70 meq/ Magnesium Sulfate 20 meq/ Multivitamins 10 

ml/Chromium/ Copper/Manganese/ Seleni/Zn 1 ml/ Insulin Human Regular 15 unit/ 

Total Parenteral Nutrition/Amino Acids/Dextrose/ Fat Emulsion Intravenous 1,800 

ml @  75 mls/hr TPN  CONT IV  Last administered on 5/28/20at 22:33;  Start 

5/28/20 at 22:00;  Stop 5/29/20 at 21:59;  Status DC


Potassium Chloride 70 meq/ Magnesium Sulfate 20 meq/ Multivitamins 10 

ml/Chromium/ Copper/Manganese/ Seleni/Zn 1 ml/ Insulin Human Regular 15 unit/ 

Total Parenteral Nutrition/Amino Acids/Dextrose/ Fat Emulsion Intravenous 1,800 

ml @  75 mls/hr TPN  CONT IV  Last administered on 5/29/20at 23:13;  Start 

5/29/20 at 22:00;  Stop 5/30/20 at 21:59;  Status DC


Potassium Chloride 80 meq/ Magnesium Sulfate 20 meq/ Multivitamins 10 ml/C

hromium/ Copper/Manganese/ Seleni/Zn 1 ml/ Insulin Human Regular 15 unit/ Total 

Parenteral Nutrition/Amino Acids/Dextrose/ Fat Emulsion Intravenous 1,800 ml @  

75 mls/hr TPN  CONT IV  Last administered on 5/30/20at 22:30;  Start 5/30/20 at 

22:00;  Stop 5/31/20 at 21:59;  Status DC


Potassium Chloride 80 meq/ Magnesium Sulfate 20 meq/ Multivitamins 10 

ml/Chromium/ Copper/Manganese/ Seleni/Zn 1 ml/ Insulin Human Regular 15 unit/ 

Total Parenteral Nutrition/Amino Acids/Dextrose/ Fat Emulsion Intravenous 1,800 

ml @  75 mls/hr TPN  CONT IV  Last administered on 5/31/20at 21:54;  Start 

5/31/20 at 22:00;  Stop 6/1/20 at 21:59;  Status DC


Potassium Chloride/Water 100 ml @  100 mls/hr 1X  ONCE IV  Last administered on 

6/1/20at 10:15;  Start 6/1/20 at 10:00;  Stop 6/1/20 at 10:59;  Status DC


Potassium Chloride 90 meq/ Magnesium Sulfate 20 meq/ Multivitamins 10 

ml/Chromium/ Copper/Manganese/ Seleni/Zn 1 ml/ Insulin Human Regular 20 unit/ 

Total Parenteral Nutrition/Amino Acids/Dextrose/ Fat Emulsion Intravenous 1,800 

ml @  75 mls/hr TPN  CONT IV  Last administered on 6/1/20at 22:28;  Start 6/1/20

at 22:00;  Stop 6/2/20 at 21:59;  Status DC


Potassium Chloride 90 meq/ Magnesium Sulfate 20 meq/ Multivitamins 10 

ml/Chromium/ Copper/Manganese/ Seleni/Zn 1 ml/ Insulin Human Regular 20 unit/ 

Total Parenteral Nutrition/Amino Acids/Dextrose/ Fat Emulsion Intravenous 1,800 

ml @  75 mls/hr TPN  CONT IV  Last administered on 6/2/20at 22:08;  Start 6/2/20

at 22:00;  Stop 6/3/20 at 21:59;  Status DC


Lorazepam (Ativan Inj) 0.25 mg PRN Q4HRS  PRN IVP ANXIETY / AGITATION Last 

administered on 6/13/20at 02:25;  Start 6/3/20 at 07:30


Potassium Chloride 90 meq/ Magnesium Sulfate 20 meq/ Multivitamins 10 

ml/Chromium/ Copper/Manganese/ Seleni/Zn 1 ml/ Insulin Human Regular 20 unit/ 

Total Parenteral Nutrition/Amino Acids/Dextrose/ Fat Emulsion Intravenous 1,800 

ml @  75 mls/hr TPN  CONT IV  Last administered on 6/3/20at 23:13;  Start 6/3/20

at 22:00;  Stop 6/4/20 at 21:59;  Status DC


Furosemide (Lasix) 40 mg DAILY IVP  Last administered on 6/5/20at 11:14;  Start 

6/3/20 at 13:30;  Stop 6/7/20 at 09:12;  Status DC


Fluoxetine HCl (PROzac) 20 mg QHS PEG  Last administered on 6/18/20at 22:53;  

Start 6/4/20 at 21:00


Fentanyl (Duragesic 50mcg/ Hr Patch) 1 patch Q72H TD  Last administered on 

6/4/20at 21:22;  Start 6/4/20 at 21:00;  Stop 6/13/20 at 12:00;  Status DC


Potassium Chloride 40 meq/ Potassium Acetate 60 meq/Magnesium Sulfate 10 meq/ 

Multivitamins 10 ml/Chromium/ Copper/Manganese/ Seleni/Zn 1 ml/ Insulin Human 

Regular 20 unit/ Total Parenteral Nutrition/Amino Acids/Dextrose/ Fat Emulsion 

Intravenous 1,800 ml @  75 mls/hr TPN  CONT IV  Last administered on 6/5/20at 

00:03;  Start 6/4/20 at 22:00;  Stop 6/5/20 at 21:59;  Status DC


Potassium Acetate 80 meq/Magnesium Sulfate 5 meq/ Multivitamins 10 ml/Chromium/ 

Copper/Manganese/ Seleni/Zn 1 ml/ Insulin Human Regular 20 unit/ Total 

Parenteral Nutrition/Amino Acids/Dextrose/ Fat Emulsion Intravenous 1,920 ml @  

80 mls/hr TPN  CONT IV  Last administered on 6/5/20at 21:59;  Start 6/5/20 at 

22:00;  Stop 6/6/20 at 21:59;  Status DC


Potassium Acetate 60 meq/Magnesium Sulfate 5 meq/ Multivitamins 10 ml/Chromium/ 

Copper/Manganese/ Seleni/Zn 1 ml/ Insulin Human Regular 30 unit/ Total 

Parenteral Nutrition/Amino Acids/Dextrose/ Fat Emulsion Intravenous 1,920 ml @  

80 mls/hr TPN  CONT IV  Last administered on 6/6/20at 21:54;  Start 6/6/20 at 

22:00;  Stop 6/7/20 at 21:59;  Status DC


Norepinephrine Bitartrate 8 mg/ Dextrose 258 ml @  13.332 mls/ hr CONT  PRN IV 

PER PROTOCOL Last administered on 6/7/20at 21:46;  Start 6/7/20 at 06:30


Albumin Human 500 ml @  125 mls/hr 1X  ONCE IV  Last administered on 6/7/20at 

08:10;  Start 6/7/20 at 08:15;  Stop 6/7/20 at 12:14;  Status DC


Potassium Acetate 40 meq/Magnesium Sulfate 5 meq/ Multivitamins 10 ml/Chromium/ 

Copper/Manganese/ Seleni/Zn 1 ml/ Insulin Human Regular 30 unit/ Total 

Parenteral Nutrition/Amino Acids/Dextrose/ Fat Emulsion Intravenous 1,920 ml @  

80 mls/hr TPN  CONT IV  Last administered on 6/7/20at 22:23;  Start 6/7/20 at 

22:00;  Stop 6/8/20 at 21:59;  Status DC


Meropenem 1 gm/ Sodium Chloride 100 ml @  200 mls/hr Q8HRS IV ;  Start 6/7/20 at

14:00;  Status Cancel


Meropenem 1 gm/ Sodium Chloride 100 ml @  200 mls/hr Q8HRS IV  Last administered

on 6/7/20at 11:04;  Start 6/7/20 at 10:00;  Stop 6/7/20 at 13:00;  Status DC


Meropenem 1 gm/ Sodium Chloride 100 ml @  200 mls/hr Q12HR IV  Last administered

on 6/19/20at 08:35;  Start 6/7/20 at 21:00


Sodium Chloride 1,000 ml @  1,000 mls/hr 1X  ONCE IV  Last administered on 

6/7/20at 11:06;  Start 6/7/20 at 10:45;  Stop 6/7/20 at 11:44;  Status DC


Micafungin Sodium 100 mg/Dextrose 100 ml @  100 mls/hr Q24H IV  Last 

administered on 6/18/20at 10:57;  Start 6/7/20 at 11:00


Daptomycin 410 mg/ Sodium Chloride 50 ml @  100 mls/hr Q24H IV  Last 

administered on 6/9/20at 13:33;  Start 6/7/20 at 14:00;  Stop 6/10/20 at 08:30; 

Status DC


Midazolam HCl (Versed) 2 mg STK-MED ONCE .ROUTE ;  Start 6/7/20 at 14:47;  Stop 

6/7/20 at 14:48;  Status DC


Fentanyl Citrate (Fentanyl 2ml Vial) 100 mcg STK-MED ONCE .ROUTE ;  Start 6/7/20

at 14:47;  Stop 6/7/20 at 14:48;  Status DC


Flumazenil (Romazicon) 0.5 mg STK-MED ONCE IV ;  Start 6/7/20 at 14:48;  Stop 

6/7/20 at 14:48;  Status DC


Naloxone HCl (Narcan) 0.4 mg STK-MED ONCE .ROUTE ;  Start 6/7/20 at 14:48;  Stop

6/7/20 at 14:48;  Status DC


Lidocaine HCl (Lidocaine 1% 20ml Vial) 20 ml STK-MED ONCE .ROUTE ;  Start 6/7/20

at 14:48;  Stop 6/7/20 at 14:48;  Status DC


Midazolam HCl (Versed) 2 mg 1X  ONCE IV  Last administered on 6/7/20at 15:28;  

Start 6/7/20 at 15:00;  Stop 6/7/20 at 15:01;  Status DC


Fentanyl Citrate (Fentanyl 2ml Vial) 100 mcg 1X  ONCE IV  Last administered on 

6/7/20at 15:28;  Start 6/7/20 at 15:00;  Stop 6/7/20 at 15:01;  Status DC


Lidocaine HCl (Lidocaine 1% 20ml Vial) 20 ml 1X  ONCE INJ  Last administered on 

6/7/20at 15:30;  Start 6/7/20 at 15:00;  Stop 6/7/20 at 15:01;  Status DC


Sodium Chloride 1,000 ml @  100 mls/hr Q10H IV  Last administered on 6/16/20at 

07:30;  Start 6/7/20 at 20:00;  Stop 6/16/20 at 11:26;  Status DC


Sodium Bicarbonate (Sodium Bicarb Adult 8.4% Syr) 50 meq 1X  ONCE IV  Last admi

nistered on 6/7/20at 21:47;  Start 6/7/20 at 22:00;  Stop 6/7/20 at 22:01;  

Status DC


Potassium Acetate 40 meq/Magnesium Sulfate 5 meq/ Multivitamins 10 ml/Chromium/ 

Copper/Manganese/ Seleni/Zn 1 ml/ Insulin Human Regular 30 unit/ Total 

Parenteral Nutrition/Amino Acids/Dextrose/ Fat Emulsion Intravenous 1,920 ml @  

80 mls/hr TPN  CONT IV  Last administered on 6/8/20at 22:28;  Start 6/8/20 at 

22:00;  Stop 6/9/20 at 21:59;  Status DC


Sodium Chloride 500 ml @  500 mls/hr 1X  ONCE IV  Last administered on 6/9/20at 

06:39;  Start 6/9/20 at 06:45;  Stop 6/9/20 at 07:44;  Status DC


Potassium Acetate 40 meq/Magnesium Sulfate 5 meq/ Multivitamins 10 ml/Chromium/ 

Copper/Manganese/ Seleni/Zn 1 ml/ Insulin Human Regular 30 unit/ Total 

Parenteral Nutrition/Amino Acids/Dextrose/ Fat Emulsion Intravenous 1,920 ml @  

80 mls/hr TPN  CONT IV  Last administered on 6/9/20at 22:03;  Start 6/9/20 at 

22:00;  Stop 6/10/20 at 21:59;  Status DC


Metoprolol Tartrate (Lopressor Vial) 5 mg PRN Q6HRS  PRN IVP HYPERTENSION Last 

administered on 6/18/20at 10:14;  Start 6/10/20 at 09:00


Potassium Acetate 40 meq/Magnesium Sulfate 5 meq/ Multivitamins 10 ml/Chromium/ 

Copper/Manganese/ Seleni/Zn 1 ml/ Insulin Human Regular 30 unit/ Total 

Parenteral Nutrition/Amino Acids/Dextrose/ Fat Emulsion Intravenous 1,920 ml @  

80 mls/hr TPN  CONT IV  Last administered on 6/10/20at 21:26;  Start 6/10/20 at 

22:00;  Stop 6/11/20 at 21:59;  Status DC


Potassium Acetate 40 meq/Magnesium Sulfate 5 meq/ Multivitamins 10 ml/Chromium/ 

Copper/Manganese/ Seleni/Zn 1 ml/ Insulin Human Regular 30 unit/ Total 

Parenteral Nutrition/Amino Acids/Dextrose/ Fat Emulsion Intravenous 1,920 ml @  

80 mls/hr TPN  CONT IV  Last administered on 6/11/20at 23:23;  Start 6/11/20 at 

22:00;  Stop 6/12/20 at 21:59;  Status DC


Potassium Acetate 40 meq/Magnesium Sulfate 5 meq/ Multivitamins 10 ml/Chromium/ 

Copper/Manganese/ Seleni/Zn 1 ml/ Insulin Human Regular 30 unit/ Total 

Parenteral Nutrition/Amino Acids/Dextrose/ Fat Emulsion Intravenous 1,920 ml @  

80 mls/hr TPN  CONT IV  Last administered on 6/12/20at 21:35;  Start 6/12/20 at 

22:00;  Stop 6/13/20 at 21:59;  Status DC


Furosemide (Lasix) 20 mg 1X  ONCE IVP  Last administered on 6/13/20at 06:26;  

Start 6/13/20 at 06:15;  Stop 6/13/20 at 06:16;  Status DC


Methylprednisolone Sodium Succinate (SOLU-Medrol 125MG VIAL) 125 mg 1X  ONCE IV 

Last administered on 6/13/20at 06:26;  Start 6/13/20 at 06:15;  Stop 6/13/20 at 

06:16;  Status DC


Albuterol/ Ipratropium (Duoneb) 3 ml Q4HRS NEB  Last administered on 6/19/20at 

09:33;  Start 6/13/20 at 08:00


Fentanyl Citrate 30 ml @ 0 mls/hr CONT  PRN IV SEE PROTOCOL Last administered on

6/18/20at 22:54;  Start 6/13/20 at 06:00


Propofol 100 ml @ 0 mls/hr CONT  PRN IV SEE PROTOCOL Last administered on 

6/17/20at 22:22;  Start 6/13/20 at 06:00


Fentanyl Citrate (Fentanyl 2ml Vial) 25 mcg PRN Q1HR  PRN IV SEE COMMENTS;  

Start 6/13/20 at 06:00


Fentanyl Citrate (Fentanyl 2ml Vial) 50 mcg PRN Q1HR  PRN IV SEE COMMENTS;  

Start 6/13/20 at 06:00


Chlorhexidine Gluconate (Peridex) 15 ml BID MM ;  Start 6/13/20 at 09:00;  Stop 

6/13/20 at 07:58;  Status DC


Potassium Acetate 40 meq/Magnesium Sulfate 5 meq/ Multivitamins 10 ml/Chromium/ 

Copper/Manganese/ Seleni/Zn 1 ml/ Insulin Human Regular 30 unit/ Total 

Parenteral Nutrition/Amino Acids/Dextrose/ Fat Emulsion Intravenous 1,920 ml @  

80 mls/hr TPN  CONT IV  Last administered on 6/13/20at 21:19;  Start 6/13/20 at 

22:00;  Stop 6/14/20 at 21:59;  Status DC


Acetylcysteine (Mucomyst 20% Resp Treatment) 600 mg BID NEB  Last administered 

on 6/19/20at 09:33;  Start 6/13/20 at 21:00


Magnesium Sulfate 100 ml @  25 mls/hr 1X  ONCE IV  Last administered on 

6/13/20at 15:48;  Start 6/13/20 at 15:45;  Stop 6/13/20 at 19:44;  Status DC


Potassium Acetate 40 meq/Magnesium Sulfate 5 meq/ Multivitamins 10 ml/Chromium/ 

Copper/Manganese/ Seleni/Zn 1 ml/ Insulin Human Regular 30 unit/ Total 

Parenteral Nutrition/Amino Acids/Dextrose/ Fat Emulsion Intravenous 1,920 ml @  

80 mls/hr TPN  CONT IV  Last administered on 6/14/20at 21:35;  Start 6/14/20 at 

22:00;  Stop 6/15/20 at 21:59;  Status DC


Potassium Chloride/Water 100 ml @  100 mls/hr Q1H IV  Last administered on 

6/15/20at 08:31;  Start 6/15/20 at 07:00;  Stop 6/15/20 at 08:59;  Status DC


Potassium Acetate 40 meq/Magnesium Sulfate 5 meq/ Multivitamins 10 ml/Chromium/ 

Copper/Manganese/ Seleni/Zn 1 ml/ Insulin Human Regular 30 unit/ Total 

Parenteral Nutrition/Amino Acids/Dextrose/ Fat Emulsion Intravenous 1,920 ml @  

80 mls/hr TPN  CONT IV  Last administered on 6/15/20at 21:54;  Start 6/15/20 at 

22:00;  Stop 6/16/20 at 19:34;  Status DC


Lidocaine HCl (Buffered Lidocaine 1%) 3 ml STK-MED ONCE .ROUTE ;  Start 6/15/20 

at 12:14;  Stop 6/15/20 at 12:14;  Status DC


Lidocaine HCl (Buffered Lidocaine 1%) 3 ml 1X  ONCE IJ  Last administered on 

6/15/20at 13:11;  Start 6/15/20 at 13:00;  Stop 6/15/20 at 13:01;  Status DC


Magnesium Sulfate 50 ml @ 25 mls/hr 1X  ONCE IV ;  Start 6/16/20 at 08:15;  Stop

6/16/20 at 10:14;  Status DC


Potassium Acetate 40 meq/Magnesium Sulfate 10 meq/ Multivitamins 10 ml/Chromium/

Copper/Manganese/ Seleni/Zn 1 ml/ Insulin Human Regular 20 unit/ Total 

Parenteral Nutrition/Amino Acids/Dextrose/ Fat Emulsion Intravenous 1,920 ml @  

80 mls/hr TPN  CONT IV  Last administered on 6/16/20at 21:32;  Start 6/16/20 at 

22:00;  Stop 6/17/20 at 21:59;  Status DC


Potassium Chloride/Water 100 ml @  100 mls/hr Q1H IV  Last administered on 

6/17/20at 09:12;  Start 6/17/20 at 08:00;  Stop 6/17/20 at 09:59;  Status DC


Alteplase, Recombinant (Cathflo For Central Catheter Clearance) 4 mg 1X  ONCE 

INT CAT ;  Start 6/17/20 at 09:15;  Stop 6/17/20 at 09:16;  Status UNV


Alteplase, Recombinant (Cathflo For Central Catheter Clearance) 4 mg 1X  ONCE 

INT CAT ;  Start 6/17/20 at 09:15;  Stop 6/17/20 at 09:16;  Status UNV


Alteplase, Recombinant (Cathflo For Central Catheter Clearance) 4 mg 1X  ONCE 

INT CAT ;  Start 6/17/20 at 09:15;  Stop 6/17/20 at 09:16;  Status UNV


Alteplase, Recombinant 4 mg/ Sodium Chloride 20 ml @ 20 mls/hr 1X  ONCE IV  Last

administered on 6/17/20at 10:10;  Start 6/17/20 at 10:00;  Stop 6/17/20 at 

10:59;  Status DC


Alteplase, Recombinant 4 mg/ Sodium Chloride 20 ml @ 20 mls/hr 1X  ONCE IV  Last

administered on 6/17/20at 10:09;  Start 6/17/20 at 10:00;  Stop 6/17/20 at 

10:59;  Status DC


Alteplase, Recombinant 4 mg/ Sodium Chloride 20 ml @ 20 mls/hr 1X  ONCE IV  Last

administered on 6/17/20at 10:09;  Start 6/17/20 at 10:00;  Stop 6/17/20 at 

10:59;  Status DC


Potassium Acetate 60 meq/Magnesium Sulfate 10 meq/ Multivitamins 10 ml/Chromium/

Copper/Manganese/ Seleni/Zn 1 ml/ Insulin Human Regular 20 unit/ Total 

Parenteral Nutrition/Amino Acids/Dextrose/ Fat Emulsion Intravenous 1,920 ml @  

80 mls/hr TPN  CONT IV  Last administered on 6/17/20at 21:55;  Start 6/17/20 at 

22:00;  Stop 6/18/20 at 21:59;  Status DC


Albumin Human 500 ml @  125 mls/hr 1X  ONCE IV  Last administered on 6/18/20at 

12:01;  Start 6/18/20 at 11:15;  Stop 6/18/20 at 15:14;  Status DC


Sodium Chloride 500 ml @  500 mls/hr 1X  ONCE IV  Last administered on 6/18/20at

13:50;  Start 6/18/20 at 11:15;  Stop 6/18/20 at 12:14;  Status DC


Potassium Acetate 60 meq/Magnesium Sulfate 14 meq/ Multivitamins 10 ml/Chromium/

Copper/Manganese/ Seleni/Zn 1 ml/ Insulin Human Regular 20 unit/ Total 

Parenteral Nutrition/Amino Acids/Dextrose/ Fat Emulsion Intravenous 1,920 ml @  

80 mls/hr TPN  CONT IV  Last administered on 6/18/20at 22:26;  Start 6/18/20 at 

22:00;  Stop 6/19/20 at 21:59


Ciprofloxacin/ Dextrose 200 ml @  200 mls/hr Q12HR IV  Last administered on 

6/19/20at 09:50;  Start 6/18/20 at 21:00





Active Scripts


Active


Reported


Bisoprolol Fumarate 5 Mg Tablet 10 Mg PO DAILY


Vitals/I & O





Vital Sign - Last 24 Hours








 6/18/20 6/18/20 6/18/20 6/18/20





 10:14 10:15 11:00 11:35


 


Pulse 144  119 


 


Resp  36 25 


 


B/P (MAP) 131/57  105/50 (68) 


 


Pulse Ox  98 99 99


 


O2 Delivery  Tracheal Collar Tracheal Collar Tracheal Collar


 


O2 Flow Rate    9.0





 6/18/20 6/18/20 6/18/20 6/18/20





 12:00 12:00 13:00 13:30


 


Temp  98.3  





  98.3  


 


Pulse  116 108 109


 


Resp  22 18 18


 


B/P (MAP)  98/63 (75) 89/59 (69) 87/52 (64)


 


Pulse Ox  99 99 99


 


O2 Delivery Trach Collar Tracheal Collar Tracheal Collar Tracheal Collar


 


    





    





 6/18/20 6/18/20 6/18/20 6/18/20





 14:00 15:00 15:33 16:00


 


Temp    97.9





    97.9


 


Pulse 108 105  105


 


Resp 22 16  16


 


B/P (MAP) 109/68 (82) 91/59 (70)  102/68 (79)


 


Pulse Ox 98 98 99 99


 


O2 Delivery Tracheal Collar Tracheal Collar Tracheal Collar Tracheal Collar


 


O2 Flow Rate   9.0 


 


    





    





 6/18/20 6/18/20 6/18/20 6/18/20





 16:00 17:00 18:00 19:00


 


Pulse  107 111 112


 


Resp  15 23 18


 


B/P (MAP)  91/51 (64) 114/64 (81) 102/64 (77)


 


Pulse Ox  100 98 97


 


O2 Delivery Trach Collar Tracheal Collar Tracheal Collar Tracheal Collar





 6/18/20 6/18/20 6/18/20 6/18/20





 20:00 20:00 20:04 20:04


 


Temp 98.8   





 98.8   


 


Pulse 112   


 


Resp 17   


 


B/P (MAP) 105/56 (72)   


 


Pulse Ox 98  99 99


 


O2 Delivery Tracheal Collar Trach Collar Tracheal Collar Tracheal Collar


 


O2 Flow Rate   9.0 9.0


 


    





    





 6/18/20 6/18/20 6/18/20 6/18/20





 21:00 22:00 23:00 23:58


 


Pulse 110 110 114 


 


Resp 18 15 23 


 


B/P (MAP) 114/61 (78) 106/52 (70) 111/72 (85) 


 


Pulse Ox 97 99 99 100


 


O2 Delivery Tracheal Collar Tracheal Collar Tracheal Collar Ventilator





 6/19/20 6/19/20 6/19/20 6/19/20





 00:00 00:06 01:00 02:00


 


Temp 98.2   





 98.2   


 


Pulse 106  106 108


 


Resp 20  20 20


 


B/P (MAP) 97/64 (75)  114/72 (86) 139/84 (102)


 


Pulse Ox 99  100 100


 


O2 Delivery Ventilator Trach Collar Ventilator Ventilator


 


    





    





 6/19/20 6/19/20 6/19/20 6/19/20





 03:00 04:00 04:00 05:00


 


Temp  98.3  





  98.3  


 


Pulse 110 110  106


 


Resp 20 20  20


 


B/P (MAP) 134/77 (96) 94/55 (68)  93/55 (68)


 


Pulse Ox 98 99  100


 


O2 Delivery Ventilator Ventilator Mechanical Ventilator Ventilator


 


    





    





 6/19/20 6/19/20 6/19/20 6/19/20





 05:08 06:00 07:00 08:00


 


Temp    100.7





    100.7


 


Pulse  109 106 108


 


Resp  20 20 20


 


B/P (MAP)  96/62 (73) 98/61 (73) 100/54 (69)


 


Pulse Ox 100 100 100 99


 


O2 Delivery Ventilator Ventilator Ventilator Ventilator


 


    





    





 6/19/20 6/19/20 6/19/20 6/19/20





 08:00 09:00 09:32 10:00


 


Pulse  112  


 


Resp  20  


 


B/P (MAP)  104/61 (75)  


 


Pulse Ox  98 98 98


 


O2 Delivery Mechanical Ventilator Ventilator Ventilator Tracheal Collar


 


O2 Flow Rate    9.0














Intake and Output   


 


 6/18/20 6/18/20 6/19/20





 15:00 23:00 07:00


 


Intake Total 846.85 ml 2254.43 ml 570 ml


 


Output Total 535 ml 690 ml 1065 ml


 


Balance 311.85 ml 1564.43 ml -495 ml











Hemodynamically unstable?:  No


Is patient in severe pain?:  No


Is NPO status required?:  No











OVIDIO SARAVIA MD          Jun 19, 2020 10:05

## 2020-06-19 NOTE — NUR
Pharmacy TPN Dosing Note



S: JESENIA BEAN is a 49 year old F Currently receiving Central Continuous TPN started 
03/18/20



B:Pertinent PMH: Necrotizing pancreatitis

Height: 5 feet, 8 inches

Weight: 89.8 kg



Current diet: NPO 



LABS:

Sodium:    137 

Potassium: 3.9 

Chloride:  105 

Calcium:   9.3 

Corrected Calcium: 11.54 

Magnesium: 1.9 

CO2:       26 

SCr:       0.6 

Glucose:   102-116 

Albumin:   1.2 

AST:       17 

ALT:       18 



TPN FORMULA:

TPN TYPE:  Central Continuous

AMINO ACIDS:         80 gm

DEXTROSE:            250 gm

LIPIDS:              20 gm

POTASSIUM ACETATE:   60 mEq

MAGNESIUM:           14 mEq

INSULIN:             15 units

MULTIPLE VITAMIN:    10 ml

TRACE ELEMENTS:      1 ml(s)



TPN PLAN:  

Magnesium normalized. All other electrolytes WNL. BG below goal of 140-180

for ICU patients. Will lower insulin in TPN to 15 units.

-No labs ordered for AM due to patient stability.





R: Change TPN as noted above.

Will monitor electrolytes, glucose, and tolerance to TPN.



 LORENZO LESLIE RPH, 06/19/20 [id number]

## 2020-06-20 VITALS — DIASTOLIC BLOOD PRESSURE: 58 MMHG | SYSTOLIC BLOOD PRESSURE: 104 MMHG

## 2020-06-20 VITALS — SYSTOLIC BLOOD PRESSURE: 99 MMHG | DIASTOLIC BLOOD PRESSURE: 55 MMHG

## 2020-06-20 VITALS — SYSTOLIC BLOOD PRESSURE: 104 MMHG | DIASTOLIC BLOOD PRESSURE: 58 MMHG

## 2020-06-20 VITALS — SYSTOLIC BLOOD PRESSURE: 109 MMHG | DIASTOLIC BLOOD PRESSURE: 65 MMHG

## 2020-06-20 VITALS — DIASTOLIC BLOOD PRESSURE: 56 MMHG | SYSTOLIC BLOOD PRESSURE: 92 MMHG

## 2020-06-20 VITALS — SYSTOLIC BLOOD PRESSURE: 113 MMHG | DIASTOLIC BLOOD PRESSURE: 75 MMHG

## 2020-06-20 VITALS — SYSTOLIC BLOOD PRESSURE: 90 MMHG | DIASTOLIC BLOOD PRESSURE: 53 MMHG

## 2020-06-20 VITALS — DIASTOLIC BLOOD PRESSURE: 68 MMHG | SYSTOLIC BLOOD PRESSURE: 107 MMHG

## 2020-06-20 VITALS — DIASTOLIC BLOOD PRESSURE: 9 MMHG | SYSTOLIC BLOOD PRESSURE: 141 MMHG

## 2020-06-20 VITALS — SYSTOLIC BLOOD PRESSURE: 87 MMHG | DIASTOLIC BLOOD PRESSURE: 55 MMHG

## 2020-06-20 VITALS — SYSTOLIC BLOOD PRESSURE: 97 MMHG | DIASTOLIC BLOOD PRESSURE: 56 MMHG

## 2020-06-20 VITALS — SYSTOLIC BLOOD PRESSURE: 98 MMHG | DIASTOLIC BLOOD PRESSURE: 50 MMHG

## 2020-06-20 VITALS — DIASTOLIC BLOOD PRESSURE: 75 MMHG | SYSTOLIC BLOOD PRESSURE: 113 MMHG

## 2020-06-20 VITALS — DIASTOLIC BLOOD PRESSURE: 58 MMHG | SYSTOLIC BLOOD PRESSURE: 102 MMHG

## 2020-06-20 VITALS — SYSTOLIC BLOOD PRESSURE: 107 MMHG | DIASTOLIC BLOOD PRESSURE: 65 MMHG

## 2020-06-20 VITALS — SYSTOLIC BLOOD PRESSURE: 89 MMHG | DIASTOLIC BLOOD PRESSURE: 59 MMHG

## 2020-06-20 VITALS — SYSTOLIC BLOOD PRESSURE: 116 MMHG | DIASTOLIC BLOOD PRESSURE: 81 MMHG

## 2020-06-20 VITALS — SYSTOLIC BLOOD PRESSURE: 96 MMHG | DIASTOLIC BLOOD PRESSURE: 49 MMHG

## 2020-06-20 VITALS — SYSTOLIC BLOOD PRESSURE: 88 MMHG | DIASTOLIC BLOOD PRESSURE: 46 MMHG

## 2020-06-20 VITALS — SYSTOLIC BLOOD PRESSURE: 118 MMHG | DIASTOLIC BLOOD PRESSURE: 78 MMHG

## 2020-06-20 LAB
ALBUMIN SERPL-MCNC: 1.1 G/DL (ref 3.4–5)
ALBUMIN/GLOB SERPL: 0.3 {RATIO} (ref 1–1.7)
ALP SERPL-CCNC: 101 U/L (ref 46–116)
ALT SERPL-CCNC: 18 U/L (ref 14–59)
ANION GAP SERPL CALC-SCNC: 6 MMOL/L (ref 6–14)
AST SERPL-CCNC: 17 U/L (ref 15–37)
BASOPHILS # BLD AUTO: 0 X10^3/UL (ref 0–0.2)
BASOPHILS NFR BLD: 0 % (ref 0–3)
BILIRUB SERPL-MCNC: 0.4 MG/DL (ref 0.2–1)
BUN SERPL-MCNC: 17 MG/DL (ref 7–20)
BUN/CREAT SERPL: 28 (ref 6–20)
CALCIUM SERPL-MCNC: 9.4 MG/DL (ref 8.5–10.1)
CHLORIDE SERPL-SCNC: 103 MMOL/L (ref 98–107)
CO2 SERPL-SCNC: 28 MMOL/L (ref 21–32)
CREAT SERPL-MCNC: 0.6 MG/DL (ref 0.6–1)
EOSINOPHIL NFR BLD: 0.1 X10^3/UL (ref 0–0.7)
EOSINOPHIL NFR BLD: 2 % (ref 0–3)
ERYTHROCYTE [DISTWIDTH] IN BLOOD BY AUTOMATED COUNT: 18.2 % (ref 11.5–14.5)
GFR SERPLBLD BASED ON 1.73 SQ M-ARVRAT: 106.3 ML/MIN
GLOBULIN SER-MCNC: 3.3 G/DL (ref 2.2–3.8)
GLUCOSE SERPL-MCNC: 134 MG/DL (ref 70–99)
HCT VFR BLD CALC: 23.6 % (ref 36–47)
HGB BLD-MCNC: 7.8 G/DL (ref 12–15.5)
LYMPHOCYTES # BLD: 1.2 X10^3/UL (ref 1–4.8)
LYMPHOCYTES NFR BLD AUTO: 16 % (ref 24–48)
MCH RBC QN AUTO: 28 PG (ref 25–35)
MCHC RBC AUTO-ENTMCNC: 33 G/DL (ref 31–37)
MCV RBC AUTO: 85 FL (ref 79–100)
MONO #: 0.7 X10^3/UL (ref 0–1.1)
MONOCYTES NFR BLD: 9 % (ref 0–9)
NEUT #: 5.6 X10^3/UL (ref 1.8–7.7)
NEUTROPHILS NFR BLD AUTO: 74 % (ref 31–73)
PLATELET # BLD AUTO: 479 X10^3/UL (ref 140–400)
POTASSIUM SERPL-SCNC: 3.8 MMOL/L (ref 3.5–5.1)
PROT SERPL-MCNC: 4.4 G/DL (ref 6.4–8.2)
RBC # BLD AUTO: 2.76 X10^6/UL (ref 3.5–5.4)
SODIUM SERPL-SCNC: 137 MMOL/L (ref 136–145)
WBC # BLD AUTO: 7.7 X10^3/UL (ref 4–11)

## 2020-06-20 RX ADMIN — INSULIN LISPRO SCH UNITS: 100 INJECTION, SOLUTION INTRAVENOUS; SUBCUTANEOUS at 18:00

## 2020-06-20 RX ADMIN — DILTIAZEM HYDROCHLORIDE SCH MLS/HR: 5 INJECTION INTRAVENOUS at 20:52

## 2020-06-20 RX ADMIN — INSULIN LISPRO SCH UNITS: 100 INJECTION, SOLUTION INTRAVENOUS; SUBCUTANEOUS at 23:59

## 2020-06-20 RX ADMIN — PROCHLORPERAZINE EDISYLATE PRN MG: 5 INJECTION INTRAMUSCULAR; INTRAVENOUS at 00:13

## 2020-06-20 RX ADMIN — IPRATROPIUM BROMIDE AND ALBUTEROL SULFATE SCH ML: .5; 3 SOLUTION RESPIRATORY (INHALATION) at 20:00

## 2020-06-20 RX ADMIN — INSULIN LISPRO SCH UNITS: 100 INJECTION, SOLUTION INTRAVENOUS; SUBCUTANEOUS at 00:00

## 2020-06-20 RX ADMIN — CIPROFLOXACIN SCH MLS/HR: 2 INJECTION, SOLUTION INTRAVENOUS at 08:37

## 2020-06-20 RX ADMIN — PROPOFOL PRN MLS/HR: 10 INJECTION, EMULSION INTRAVENOUS at 23:50

## 2020-06-20 RX ADMIN — IPRATROPIUM BROMIDE AND ALBUTEROL SULFATE SCH ML: .5; 3 SOLUTION RESPIRATORY (INHALATION) at 09:00

## 2020-06-20 RX ADMIN — IPRATROPIUM BROMIDE AND ALBUTEROL SULFATE SCH ML: .5; 3 SOLUTION RESPIRATORY (INHALATION) at 00:03

## 2020-06-20 RX ADMIN — DILTIAZEM HYDROCHLORIDE SCH MLS/HR: 5 INJECTION INTRAVENOUS at 13:05

## 2020-06-20 RX ADMIN — PROCHLORPERAZINE EDISYLATE PRN MG: 5 INJECTION INTRAMUSCULAR; INTRAVENOUS at 23:50

## 2020-06-20 RX ADMIN — PANTOPRAZOLE SODIUM SCH MG: 40 INJECTION, POWDER, FOR SOLUTION INTRAVENOUS at 08:35

## 2020-06-20 RX ADMIN — INSULIN LISPRO SCH UNITS: 100 INJECTION, SOLUTION INTRAVENOUS; SUBCUTANEOUS at 13:09

## 2020-06-20 RX ADMIN — IPRATROPIUM BROMIDE AND ALBUTEROL SULFATE SCH ML: .5; 3 SOLUTION RESPIRATORY (INHALATION) at 04:00

## 2020-06-20 RX ADMIN — CIPROFLOXACIN SCH MLS/HR: 2 INJECTION, SOLUTION INTRAVENOUS at 20:52

## 2020-06-20 RX ADMIN — Medication PRN EACH: at 08:55

## 2020-06-20 RX ADMIN — IPRATROPIUM BROMIDE AND ALBUTEROL SULFATE SCH ML: .5; 3 SOLUTION RESPIRATORY (INHALATION) at 12:00

## 2020-06-20 RX ADMIN — PROPOFOL PRN MLS/HR: 10 INJECTION, EMULSION INTRAVENOUS at 00:12

## 2020-06-20 RX ADMIN — INSULIN LISPRO SCH UNITS: 100 INJECTION, SOLUTION INTRAVENOUS; SUBCUTANEOUS at 05:55

## 2020-06-20 RX ADMIN — DILTIAZEM HYDROCHLORIDE SCH MLS/HR: 5 INJECTION INTRAVENOUS at 08:36

## 2020-06-20 NOTE — NUR
Pharmacy TPN Dosing Note



S: JESENIA BEAN is a 49 year old F Currently receiving Central Continuous TPN started 
03/18/20



B:Pertinent PMH: 

Necrotizing pancreatitis

Height: 5 feet, 8 inches

Weight: 90.1 kg



Current diet: NPO 



LABS:

Sodium:    137 

Potassium: 3.8 

Chloride:  103 

Calcium:   9.4 

Corrected Calcium: 11.72 

Magnesium: 1.9 

CO2:       28 

SCr:       0.6 

Glucose:   134 

Albumin:   1.1 

AST:       17 

ALT:       18 



TPN FORMULA:

TPN TYPE:  Central Continuous

AMINO ACIDS:         80 gm

DEXTROSE:            250 gm

LIPIDS:              20 gm

SODIUM CHLORIDE:     - mEq

SODIUM ACETATE:      - mEq

SODIUM PHOSPHATE:    - mmol

POTASSIUM CHLORIDE:  - mEq

POTASSIUM ACETATE:   60 mEq

POTASSIUM PHOSPHATE: - mmol

MAGNESIUM:           14 mEq

CALCIUM:             - mEq

INSULIN:             15 units

MULTIPLE VITAMIN:    10 ml

TRACE ELEMENTS:      1 ml(s)



TPN PLAN:  

Labs stable. Glucose low to normal. No change to formula. No labs tomorrow

due to stability.





R: Continue TPN AS ABOVE.

Will monitor electrolytes, glucose, and tolerance to TPN.



 CANDACE BELTRE formerly [facility], 06/20/20 1363

## 2020-06-20 NOTE — PDOC
Infectious Disease Note


Subjective


Subjective


Low-grade fever, Tmax 100.7


vent FiO2 35%


TPN





ROS


ROS


limited due to patient's condition





Vital Sign


Vital Signs





Vital Signs








  Date Time  Temp Pulse Resp B/P (MAP) Pulse Ox O2 Delivery O2 Flow Rate FiO2


 


6/20/20 06:00  108 22 89/59 (69) 99 Ventilator  


 


6/20/20 03:00 100.1       





 100.1       


 


6/19/20 20:09       8.0 











Physical Exam


PHYSICAL EXAM


GENERAL: Propped up in bed, awake, weak appearing 


HEENT: NGT in place. Oral mucosa dry


NECK:  Tracheostomy 


LUNGS: Diminished aeration bases, no accessory muscle use - CT on left with 

clear fluid


HEART:  S1, S2,regular 


ABDOMEN: Mild distention, bowel sounds present, soft, grimaces to palpation, 

drains x 3


: [place] ( 6/7)


EXTREMITIES: + edema BLE


SKIN: no signs of gen rash 


CNS: Awake 


LUE-PICC (5/29) without signs of complications





Labs


Lab





Laboratory Tests








Test


 6/19/20


12:25 6/19/20


14:43 6/19/20


23:40 6/20/20


05:50


 


Glucose (Fingerstick)


 185 mg/dL


(70-99) 


 147 mg/dL


(70-99) 





 


Ionized Calcium


 


 1.50 mmol/L


(1.13-1.32) 


 





 


Iron Level


 


 8 ug/dL


() 


 





 


Total Iron Binding Capacity


 


 56 ug/dL


(250-450) 


 





 


Iron Saturation  14 % (15-34)   


 


White Blood Count


 


 


 


 7.7 x10^3/uL


(4.0-11.0)


 


Red Blood Count


 


 


 


 2.76 x10^6/uL


(3.50-5.40)


 


Hemoglobin


 


 


 


 7.8 g/dL


(12.0-15.5)


 


Hematocrit


 


 


 


 23.6 %


(36.0-47.0)


 


Mean Corpuscular Volume    85 fL () 


 


Mean Corpuscular Hemoglobin    28 pg (25-35) 


 


Mean Corpuscular Hemoglobin


Concent 


 


 


 33 g/dL


(31-37)


 


Red Cell Distribution Width


 


 


 


 18.2 %


(11.5-14.5)


 


Platelet Count


 


 


 


 479 x10^3/uL


(140-400)


 


Neutrophils (%) (Auto)    74 % (31-73) 


 


Lymphocytes (%) (Auto)    16 % (24-48) 


 


Monocytes (%) (Auto)    9 % (0-9) 


 


Eosinophils (%) (Auto)    2 % (0-3) 


 


Basophils (%) (Auto)    0 % (0-3) 


 


Neutrophils # (Auto)


 


 


 


 5.6 x10^3/uL


(1.8-7.7)


 


Lymphocytes # (Auto)


 


 


 


 1.2 x10^3/uL


(1.0-4.8)


 


Monocytes # (Auto)


 


 


 


 0.7 x10^3/uL


(0.0-1.1)


 


Eosinophils # (Auto)


 


 


 


 0.1 x10^3/uL


(0.0-0.7)


 


Basophils # (Auto)


 


 


 


 0.0 x10^3/uL


(0.0-0.2)


 


Sodium Level


 


 


 


 137 mmol/L


(136-145)


 


Potassium Level


 


 


 


 3.8 mmol/L


(3.5-5.1)


 


Chloride Level


 


 


 


 103 mmol/L


()


 


Carbon Dioxide Level


 


 


 


 28 mmol/L


(21-32)


 


Anion Gap    6 (6-14) 


 


Blood Urea Nitrogen


 


 


 


 17 mg/dL


(7-20)


 


Creatinine


 


 


 


 0.6 mg/dL


(0.6-1.0)


 


Estimated GFR


(Cockcroft-Gault) 


 


 


 106.3 





 


BUN/Creatinine Ratio    28 (6-20) 


 


Glucose Level


 


 


 


 134 mg/dL


(70-99)


 


Calcium Level


 


 


 


 9.4 mg/dL


(8.5-10.1)


 


Total Bilirubin


 


 


 


 0.4 mg/dL


(0.2-1.0)


 


Aspartate Amino Transf


(AST/SGOT) 


 


 


 17 U/L (15-37) 





 


Alanine Aminotransferase


(ALT/SGPT) 


 


 


 18 U/L (14-59) 





 


Alkaline Phosphatase


 


 


 


 101 U/L


()


 


Total Protein


 


 


 


 4.4 g/dL


(6.4-8.2)


 


Albumin


 


 


 


 1.1 g/dL


(3.4-5.0)


 


Albumin/Globulin Ratio    0.3 (1.0-1.7) 


 


Test


 6/20/20


05:55 


 


 





 


Glucose (Fingerstick)


 131 mg/dL


(70-99) 


 


 











Micro


Pleural fluid, 6/15


 ANAEROBIC-AEROBIC CULTURE  Preliminary  


        Preliminary





        No Growth on 06/16/20 at 1117


        No Growth on 06/17/20 at 1038





Objective


Assessment


Patient with prolonged hospitalization


Multiple medical problems


Multiple surgical procedures





Vomiting


Fever 6/18 WBC nml - Added cipro 6/18 with h/p res PSA in sputum maybe sec to 

ileus given her vomiting. Art- line placed 6/7 - d/c'd 6/18


Respiratory failure/trach/vent: sputum 6/13  + PSAE (I merrem) 


Pleural effusions s/p left thoracentesis, 5/12. no culture. s/p left chest tube,

6/15 no growth


Gallstone pancreatitis with necrosis. 


   -CT A/P 6/6 showed multiple pseudocysts, slight larger on the right. s/p 

drains x 3, 6/7.  + PSAE (MDRO-R Cefepime, Zosyn ANSON < 64) and yeast, 


   -s/p drain 4/27. C. parapsilosis. s/p drain 5/6 + yeast & high amylase; s/p 

additional drain on 5/8. Drains removed. 


Ascites s/p paracentesis 4/15 & 5/6. C. parapsilosis 


JED. off HD. 


Anemia 


Prediabetes


HTN





Plan


Plan of Care


Continue cipro 6/18. sputum from 6/13 - growing PSA - may be colonization (I to 

Meropenem), clinically stable from resp standpoint


Continue meropenem, has MDRO PSAE June 7 from abd


Continue micafungin June 7


Follow-up cultures fluid for yeast ID


Monitor labs/temp


General surgery following


Monitor drain output


Maintain aspiration precaution


Supportive care





[facility] [place] [phone]








Critically ill


Attending Co-Sign


The patient was seen and interviewed as well as examined at the bedside. The 

chart was reviewed. The case was discussed. Agree with the plan of care.











HAVEN WINTERS        Jun 20, 2020 08:54


LINN FRANZ MD               Jun 20, 2020 11:15

## 2020-06-20 NOTE — PDOC
PROGRESS NOTES


Chief Complaint


Chief Complaint


impression =================











History of Present Illness


48yo F w/ PMHx HTN, prediabetes who presented the emergency room complaints of 

abdominal pain. Patient described off and on 3 days. She states is constant, 

described as a squeezing sensation in a band-like distribution. + nausea, 

vomiting.  She denies any fever or diarrhea.  Patient denies any abdominal 

surgical procedures.  She stateed is worse with movements, car ride.  Pain 

initially was upper abdomen however now pretty much generalized.  Last bowel 

movement was 3/15/2020. Nothing makes her pain better.  Patient denies any 

shortness of breath.  She does state the pain moves into her chest.  Denies any 

headache or visual changes.


Lipase 87433, , , Bilirubin 1.4.


CT abdomen confirms pancreatic inflammation, peripancreatic fluid and 

inflammatory changes around the pancreas consistent with pancreatitis. 

Cholelithiasis and 1.4cm uterine fibroid as well as possible left salpingitis. 

Admitted for further care


Gallstone pancreatitis with necrosis. 


   -CT A/P 6/6 showed multiple pseudocysts, slight larger on the right. s/p 

drains x 3, 6/7.  + PSAE (MDRO-R Cefepime, Zosyn ANSON < 64) and yeast, 


   -s/p drain 4/27. C. parapsilosis. s/p drain 5/6 + yeast & high amylase; s/p 

additional drain on 5/8. Drains removed. 


Ascites s/p paracentesis 4/15 & 5/6. C. parapsilosis 


JED. off HD. 





impression ============================











Acute hypoxic Respiratory failure required  mechanical ventilation


Tracheostomy


bilateral pleural effusions/pulm edema s/p Throacentesis on 6/15/2020


Severe Acute gallstone pancreatitis (not a surgical candidate at this time) with

necrosis


Acute kidney failure now requiring dialysis


Gallstones (Calculus of gallbladder with acute cholecystitis without 

obstruction)


HTN 


Intractable pain


Intractable nausea


Covid 19 negative. 


Acute on chronic anemia 


EEG: No seizure activityFever  - better currently - intermittent could be from 

underlying pancreatitis blood cults 5/4 - neg so far


? Ileus with vomiting


Abd distention - U/S and CT reviewed s/p 0.4 L of opaque, debris-containing 

ascites was removed 5/6


Acute pancreatitis with persistent necrosis


Gallstone pancreatitis with necrosis. 


   -CT A/P 6/6 showed multiple pseudocysts, slight larger on the right. s/p 

drains x 3, 6/7.  + PSAE (MDRO-R Cefepime, Zosyn ANSON < 64) and yeast, 


   -s/p drain 4/27. C. parapsilosis. s/p drain 5/6 + yeast & high amylase; s/p 

additional drain on 5/8. Drains removed. 


Ascites s/p paracentesis 4/15 & 5/6. C. parapsilosis 


JED. off HD. 


A large fluid collection in the pancreatic bed has slightly decreased in size, 

described below, the pancreas itself is difficult to  visualize, which could be 

due to necrosis or obscuration of pancreatic  parenchyma from the surrounding 

fluid collection.6/15 


- 4/27 status post ROBERT drain placement + C paropsilosis. s/p additional drains 

5/8


Anemia - S/p PRBCs


Cholelithiasis with thickening of the gallbladder wall.


Leucocytosis improving


JED, hyperkalemia, Metabolic acidosis off dialysis


hypocalcemia 


Prediabetes


HTN


s/p trach


ESRD on HD


Hyperglycemia


severe protein-caloric malnutrition


Moderate to large left pleural effusion with atelectasis and collapse  of most 

of the left lower lobe, stable








6/18 FEVER 101, BLOOD CULTURE X 2 


6/19  iv cipro added


6/20  vent fio2 35% 











FEN - 





PPX - SCDs, off lovenox currently, high risk for thrombosis but in light of her 

recent anemia and need for transfusion the risks do not outweigh benefits at 

this time. will continue to evaluate on a daily basis


FULL CODE


Dispo - ICU, critically ill


BACK ON VENT 6/17  vent // no distress


iv albumin 6/19


Continue meropenem, has MDRO PSAE June 7 


Continue micafungin June 7











33 MIN CC TIME





History of Present Illness


History of Present Illness


6/8: IR placed drain on 6/7. 4u PRBC after Hb drop. Hb 8.8 today. Off Levophed 

this morning. T-max 100.3. Much more lethargic today. CXR with left sided diffus

e infiltrates.


6/9: Tachycardic overnight into the 140s. NGT clamped. On BIPAP currently. 

Drains with serosanguinous discharge. WBC 8, Tmax 99.6F.


6/10: Seen on trach shield in ICU.  Hypertensive and tachycardic. Labs stable. 

blood stained drainage from drains. Afebrile.


6/11: Seen on trach shield in ICU. She is a bit confused, drowsy, but when 

sitting up is conversational and confusion somewhat clears. She is asking for 

more pain medication. Stable drains, still very tachy.Na 147


6/12: Patient vomited overnight. Aspirated. Tried to pull her trach out, she was

told she would die without her trach, she said "I know, I just want to go home".

Hb 7.6. Afebrile, still very tachycardic. 1055ml out of right sided ROBERT drain


6/13: Overnight hypoxic, on BIPAP. CXR with left sided white out lung. 

Significant mucous plug suctioned by RT with improvement in her ABG after 2 

hours this morning. Not really active, tired, lethargic. 890ml out of drains 

past 24 hours. On vent. D/w daughter bedside.


6/14: Still on vent overnight with copious pulmonary drainage on suctioning. 

Labs stable, UOP stable 1L. Drain output still significant with 1505mL. She has 

shown her phone password [id number] and made it clear she does not want her daughters 

to access her phone at this time.


6:15: Patient quite frail, she has had such a lengthy hospital stay that she is 

certainly depressed and as documented 3 days ago is wanting to go home. Most 

likely patient is also tired of being hospitalized with an end point no where in

sight. Reassurance and encouragement provided during my visit. Plans for 

thoracentesis later in the day 


6/16: No acute events reported overnight, case discussed with nursing staff 

patient in no acute distress, complaints of the abdomimal pain during my visit, 

patient is quite depressed, reassurance has been provided, discussed with 

nursing staff at bedside, replace electrolytes 


6/17 off vent / on TS , no distress








6/20 /2020


Patient seen and examined ICU BED


critically ill 


guardedlong-term prognosis 


Sputum from 6/13 - growing PSA - may be colonization I to Meropenem add Cipro


Continue meropenem, has MDRP PSAE June 7 from abd


cont micafungin June 7


bleeding from Sx. site RLQ and firmness)


max 1003 


oncology consulted











37 MIN CC TIME








Date:  Apr 27, 2020





Pre-Op Diagnosis:


Necrotizing pancreatitis





Post-Op Diagnosis:


same





Procedure Performed:


laparoscopic exploration





Surgeon:


Frandy Flores


Asst:  Dr. Luis Santos





Anesthesia Type:


GETA plus local





Blood Loss:


50





Specimans Obtained:


cultures, debris





Findings:


1000 cc ascites suctioned off, cultures sent, diffuse debris, obliteration of 

surgical planes preventing any meaningful exploration




















 





6/3/2020


Patient seen and examined in the ICU


She remains critically ill


We have been trying to hold off on her Ativan for the past 24 hours


She is a little more awake but shaky and agitated and anxious seems depressed


Discussed with RN


Chart reviewed








6/2/2020


Patient seen and examined in the ICU


She is a little more alert today but still quite ill


Appears clammy and pale and depressed/anxious


Discussed with RN


Chart reviewed











6/1/2020


Patient seen and examined in the ICU


She appears extremely ill


She is tachypneic at 35 respirations per minute and tachycardic at 132 bpm


She is extremely encephalopathic and shaky


She appears clammy


Chart reviewed


Discussed with RN


Prognosis extremely guarded at best








5/31/2020


Patient still in ICU


Resting with no apparent distress


Chart reviewed








5/30/2020


Patient seen and examined in the ICU


She is wiping her face with a cough


Discussed with RN


Chart reviewed


We hope to get her out of the ICU later today if possible








5/29/2020


Patient seen and examined in the ICU once again


She is back on NG suction


On IV Zosyn


Has IV TPN


Sedated with Precedex but anxious still


Appears somewhat clammy and pale


Chart reviewed


Discussed with RN


She remains critically ill











 


 


BRIEF OPERATIVE NOTE


Pre-Op Diagnosis


Pancreatitis with pseudocysts, suspected infection


Post-Op Diagnosis


same


Procedure Performed


CT abdominal Drains x 3


Surgeon


Hardy


Anesthesia Type:  Conscious Sedation


Findings


3 abdominal drains, 14F, with turbid pancreatic fluid and necrotic debris in 

each.


Complications


No immediate








5/9: Patient today somewhat restless and having bilious secretions from ET tube,

imaging studies ordered, discussed with consultant. Pretty poor prognosis, 

hopefully is not a fistula, poor surgical candidate. 





5/10: Imaging with no acute events, she seems more stable today compared to 

yesterday. Encouraged as much activity as possible patient at high risk for 

severe depression.





Vitals


Vitals





Vital Signs








  Date Time  Temp Pulse Resp B/P (MAP) Pulse Ox O2 Delivery O2 Flow Rate FiO2


 


6/20/20 06:00  108 22 89/59 (69) 99 Ventilator  


 


6/20/20 03:00 100.1       





 100.1       


 


6/19/20 20:09       8.0 











Physical Exam


Physical Exam





GENERAL: More Alert and looks comfortable


HEENT: NGT in place. Oral mucosa dry


NECK:  Tracheostomy 


LUNGS: Diminished aeration bases, no accessory muscle use - CT on left with 

clear fluid


HEART:  S1, S2, tachy, regular 


ABDOMEN: Mild distention, bowel sounds present, soft, grimaces to palpation 


Right lateral side drainage bag, 3 drains with bloodstained drainage.  Left side

with drainage from previous drain site - dressed - clean and dry


bleeding from Sx. site RLQ and firmness)


: [place] ( 6/ 7)


EXTREMITIES: Trace edema .no  cyanosis. Mild erythema on RUE - better


SKIN: no signs of gen rash 


CNS: Alert and responsive


LUE-PICC (5/29) without signs of complications - previous art line site without 

complications


General:  Alert, Cooperative, No acute distress


Heart:  Regular rate (SR/ST), Other (distant heart sounds)


Lungs:  Other (diminshed in bases, Rhonci in LLL)


Abdomen:  Soft, Other (NGT in place, anasarcic)


Extremities:  No cyanosis, Other (3+ bilateral LE pitting edema)


Skin:  No rashes, No significant lesion





Labs


SEX: F                    EXAM DT: 06/15/20         ACCESSION#: [id number]


STATUS: ADM IN            ORD. PHYSICIAN: RASHAWN ROSEN MD


REASON: Pleural effusion and f/u abd abscesses


PROCEDURE: CT CHEST ABDOMEN PELVIS WO





EXAM: CT CHEST, ABDOMEN, AND PELVIS WITHOUT CONTRAST


 


INDICATION: Pleural effusion and follow-up abdominal abscess


 


COMPARISON:  CT abdomen and pelvis 6/6/2020 and 5/27/2020.


 


TECHNIQUE: Helical CT imaging performed of the chest, abdomen and pelvis 


without the use of intravenous contrast. Sagittal and coronal reformats 


were obtained. 


 


One or more of the following individualized dose reduction techniques were


utilized for this examination:  


 


1. Automated exposure control


2. Adjustment of the mA and/or kV according to patient size


3. Use of iterative reconstruction technique.


 


 


FINDINGS:


 


CHEST:


 


Thyroid gland and thoracic inlet: Visualized portion of the thyroid gland 


is normal.


 


Heart and great vessels: Heart is normal in size. No pericardial effusion.


Thoracic aorta is normal in caliber. A left PICC terminates at the 


superior cavoatrial junction.


 


Mediastinum and drew: No lymphadenopathy. A tracheostomy tube terminates 


at the level of the clavicular heads. A nasogastric tube terminates in the


gastric fundus.


 


Lungs and pleura: Slightly increased moderate to large left pleural 


effusion, predominantly layering with a small partially loculated 


component anterolaterally, and consolidation or atelectasis of the entire 


left lower lobe. Mild opacities in the left upper lobe. There is a small 


right pleural effusion with mild consolidative opacities, also mildly 


increased from prior exam. 


 


Chest wall and axillae: Bilateral breast implants are noted. No axillary 


lymphadenopathy.


 


Bones: No acute osseous abnormality.


 


 


ABDOMEN AND PELVIS:


 


Liver: Liver is normal in size. A 1.2 cm hypodensity in the medial left 


hepatic lobe is unchanged.


 


Gallbladder/Biliary Tree: Cholelithiasis is unchanged. No biliary duct 


dilatation.


 


Pancreas: A large fluid collection in the pancreatic bed has slightly 


decreased in size, described below, the pancreas itself is difficult to 


visualize, which could be due to necrosis or obscuration of pancreatic 


parenchyma from the surrounding fluid collection. Evaluation is also 


limited due to lack of intravenous contrast. There is no gas within the 


pancreatic bed.


 


Spleen: Grossly normal.


 


Adrenal Glands: Normal.


 


Kidneys/Ureters/Bladder: There is unchanged mild right hydronephrosis. No 


urolithiasis. Ureters not well visualized due to fluid collections. The 


left kidney is normal. Bladder is decompressed around a [place] catheter.


 


Reproductive Organs: Uterus is anteverted. No adnexal mass. 


 


Stomach, small bowel, and colon: Nasogastric tube terminates in the 


gastric fundus. Stomach, small bowel, and colon are not well evaluated due


to lack of IV and oral contrast and presence of multiple adjacent fluid 


collections. There is no evidence of bowel obstruction.


 


Vasculature: Abdominal aorta is normal in caliber.


 


Lymph Nodes: No lymphadenopathy.


 


Peritoneum and retroperitoneum: There are multiple large fluid collections


throughout the abdomen and pelvis, some of which are likely communicating 


with each other. The fluid collection in the pancreatic bed involving the 


lesser sac is slightly smaller, now measuring approximately 12.7 x 4.5 cm 


at the level of the lesser sac (image 38, series 4), previously 13.4 x 5.5


cm. There is a new pigtail catheter in this fluid collection.


 


This fluid collection likely communicates with a fluid collection 


involving the right retroperitoneal space and right psoas muscle. This 


fluid collection measures approximately 10.1 x 9.4 cm, previously 10.3 x 


9.7 cm. There is a new pigtail catheter in this collection.


 


A fluid collection in the left paracolic gutter involving the left psoas 


muscle has slightly decreased in size, this measures 10.4 x 6.7 cm at the 


level of the inferior left renal pole, previously 11.7 x 6.7 cm. There is 


a new pleural catheter in this collection.


 


There is a suspected additional fluid collection in the lower midpelvis 


between loops of bowel and abutting the bladder and uterus, measuring 


approximately 10.0 x 4.0 cm, unchanged. This could be confirmed by oral 


contrast to differentiate from a loop of bowel.


 


No definite new fluid collection. There is a persistent small amount of 


free fluid in the pelvis. 


No free air.


 


Bones: No acute osseous abnormality.


 


Miscellaneous: Moderate new anasarca.


 


 


IMPRESSION:


 


1.  Sequela of pancreatitis with extensive pseudocyst/fluid collections 


throughout the abdomen and pelvis including a large peripancreatic fluid 


collection and bilateral retroperitoneal collections involving the psoas 


muscles. Fluid collections are unchanged to minimally decreased from CT 


6/6/2020. There are 3 new pigtail drainage catheters in place. IV or oral 


contrast may be useful to better evaluate, if possible.


 


2. Slightly increased moderate to large partially loculated left pleural 


effusion with atelectasis or consolidation of the left lower lobe. 


Slightly increased small right pleural effusion and mild right pulmonary 


opacities.


 


3. Mild right hydronephrosis.


 


4. Cholelithiasis.


 


5. New anasarca.


 


Electronically signed by: Annette Bone MD (6/15/2020 10:37 AM) 


[id number]














DICTATED and SIGNED BY:     ANNETTE BONE MD


DATE:     06/15/20 1037





PORTABLE CHEST 1V 


 


INDICATION: Reason: Vent, Pleural Effusion 104 / Spl. Instructions:  / 


History: . 


 


COMPARISON STUDY: 6/17/2020.


 


FINDINGS:


 


Life Support Devices: Stable tracheostomy, enteric tube, left PICC, left 


pleural catheter.


 


Lungs: Low lung volume. Improving right basilar opacities. Normal 


pulmonary vasculature.


 


Pleura: Small bilateral pleural effusions.


 


Heart and Mediastinum: Stable cardiomediastinal silhouette and great 


vessels. 


 


IMPRESSION:  


1. Stable life support devices.


2. Improving right basilar opacities.


3. Small bilateral pleural effusions.


 


Electronically signed by: José Miguel Moody MD (6/18/2020 8:26 AM) [id number]














DICTATED and SIGNED BY:     JOSÉ MIGUEL MOODY MD


DATE:     06/18/20 0826


Laboratory Tests








Test


 6/19/20


12:25 6/19/20


14:43 6/19/20


23:40 6/20/20


05:50


 


Glucose (Fingerstick)


 185 mg/dL


(70-99) 


 147 mg/dL


(70-99) 





 


Ionized Calcium


 


 1.50 mmol/L


(1.13-1.32) 


 





 


Iron Level


 


 8 ug/dL


() 


 





 


Total Iron Binding Capacity


 


 56 ug/dL


(250-450) 


 





 


Iron Saturation  14 % (15-34)   


 


White Blood Count


 


 


 


 7.7 x10^3/uL


(4.0-11.0)


 


Red Blood Count


 


 


 


 2.76 x10^6/uL


(3.50-5.40)


 


Hemoglobin


 


 


 


 7.8 g/dL


(12.0-15.5)


 


Hematocrit


 


 


 


 23.6 %


(36.0-47.0)


 


Mean Corpuscular Volume    85 fL () 


 


Mean Corpuscular Hemoglobin    28 pg (25-35) 


 


Mean Corpuscular Hemoglobin


Concent 


 


 


 33 g/dL


(31-37)


 


Red Cell Distribution Width


 


 


 


 18.2 %


(11.5-14.5)


 


Platelet Count


 


 


 


 479 x10^3/uL


(140-400)


 


Neutrophils (%) (Auto)    74 % (31-73) 


 


Lymphocytes (%) (Auto)    16 % (24-48) 


 


Monocytes (%) (Auto)    9 % (0-9) 


 


Eosinophils (%) (Auto)    2 % (0-3) 


 


Basophils (%) (Auto)    0 % (0-3) 


 


Neutrophils # (Auto)


 


 


 


 5.6 x10^3/uL


(1.8-7.7)


 


Lymphocytes # (Auto)


 


 


 


 1.2 x10^3/uL


(1.0-4.8)


 


Monocytes # (Auto)


 


 


 


 0.7 x10^3/uL


(0.0-1.1)


 


Eosinophils # (Auto)


 


 


 


 0.1 x10^3/uL


(0.0-0.7)


 


Basophils # (Auto)


 


 


 


 0.0 x10^3/uL


(0.0-0.2)


 


Sodium Level


 


 


 


 137 mmol/L


(136-145)


 


Potassium Level


 


 


 


 3.8 mmol/L


(3.5-5.1)


 


Chloride Level


 


 


 


 103 mmol/L


()


 


Carbon Dioxide Level


 


 


 


 28 mmol/L


(21-32)


 


Anion Gap    6 (6-14) 


 


Blood Urea Nitrogen


 


 


 


 17 mg/dL


(7-20)


 


Creatinine


 


 


 


 0.6 mg/dL


(0.6-1.0)


 


Estimated GFR


(Cockcroft-Gault) 


 


 


 106.3 





 


BUN/Creatinine Ratio    28 (6-20) 


 


Glucose Level


 


 


 


 134 mg/dL


(70-99)


 


Calcium Level


 


 


 


 9.4 mg/dL


(8.5-10.1)


 


Total Bilirubin


 


 


 


 0.4 mg/dL


(0.2-1.0)


 


Aspartate Amino Transf


(AST/SGOT) 


 


 


 17 U/L (15-37) 





 


Alanine Aminotransferase


(ALT/SGPT) 


 


 


 18 U/L (14-59) 





 


Alkaline Phosphatase


 


 


 


 101 U/L


()


 


Total Protein


 


 


 


 4.4 g/dL


(6.4-8.2)


 


Albumin


 


 


 


 1.1 g/dL


(3.4-5.0)


 


Albumin/Globulin Ratio    0.3 (1.0-1.7) 


 


Test


 6/20/20


05:55 


 


 





 


Glucose (Fingerstick)


 131 mg/dL


(70-99) 


 


 














Assessment and Plan


Assessmemt and Plan


Problems


Medical Problems:


(1) Acute pancreatitis


Status: Acute  





(2) Cholelithiasis


Status: Acute  











Comment


Review of Relevant


I have reviewed the following items josy (where applicable) has been applied.


Labs





Laboratory Tests








Test


 6/18/20


11:45 6/18/20


17:31 6/19/20


01:26 6/19/20


06:20


 


Glucose (Fingerstick)


 148 mg/dL


(70-99) 135 mg/dL


(70-99) 116 mg/dL


(70-99) 





 


White Blood Count


 


 


 


 8.9 x10^3/uL


(4.0-11.0)


 


Red Blood Count


 


 


 


 2.94 x10^6/uL


(3.50-5.70)


 


Hemoglobin


 


 


 


 8.2 g/dL


(12.0-15.5)


 


Hematocrit


 


 


 


 24.9 %


(36.0-47.0)


 


Mean Corpuscular Volume    85 fL () 


 


Mean Corpuscular Hemoglobin    28 pg (25-35) 


 


Mean Corpuscular Hemoglobin


Concent 


 


 


 33 g/dL


(31-37)


 


Red Cell Distribution Width


 


 


 


 18.2 %


(11.5-14.5)


 


Platelet Count


 


 


 


 452 x10^3/uL


(140-400)


 


Neutrophils (%) (Auto)    77 % (31-73) 


 


Lymphocytes (%) (Auto)    15 % (24-48) 


 


Monocytes (%) (Auto)    7 % (0-9) 


 


Eosinophils (%) (Auto)    1 % (0-3) 


 


Basophils (%) (Auto)    0 % (0-3) 


 


Neutrophils # (Auto)


 


 


 


 6.8 x10^3/uL


(1.8-7.7)


 


Lymphocytes # (Auto)


 


 


 


 1.3 x10^3/uL


(1.0-4.8)


 


Monocytes # (Auto)


 


 


 


 0.6 x10^3/uL


(0.0-1.1)


 


Eosinophils # (Auto)


 


 


 


 0.1 x10^3/uL


(0.0-0.7)


 


Basophils # (Auto)


 


 


 


 0.0 x10^3/uL


(0.0-0.2)


 


Absolute Reticulocyte Count


 


 


 


 0.120 x10^6/uL


(0.020-0.120)


 


Percent Reticulocyte Count


 


 


 


 4.1 %


(0.5-2.3)


 


Immature Reticulocyte Fraction


 


 


 


 0.26


(0.20-0.60)


 


Sodium Level


 


 


 


 137 mmol/L


(136-145)


 


Potassium Level


 


 


 


 3.9 mmol/L


(3.5-5.1)


 


Chloride Level


 


 


 


 105 mmol/L


()


 


Carbon Dioxide Level


 


 


 


 26 mmol/L


(21-32)


 


Anion Gap    6 (6-14) 


 


Blood Urea Nitrogen


 


 


 


 18 mg/dL


(7-20)


 


Creatinine


 


 


 


 0.6 mg/dL


(0.6-1.0)


 


Estimated GFR


(Cockcroft-Gault) 


 


 


 106.3 





 


BUN/Creatinine Ratio    30 (6-20) 


 


Glucose Level


 


 


 


 109 mg/dL


(70-99)


 


Calcium Level


 


 


 


 9.3 mg/dL


(8.5-10.1)


 


Magnesium Level


 


 


 


 1.9 mg/dL


(1.8-2.4)


 


Total Bilirubin


 


 


 


 0.6 mg/dL


(0.2-1.0)


 


Aspartate Amino Transf


(AST/SGOT) 


 


 


 17 U/L (15-37) 





 


Alanine Aminotransferase


(ALT/SGPT) 


 


 


 18 U/L (14-59) 





 


Alkaline Phosphatase


 


 


 


 92 U/L


()


 


Total Protein


 


 


 


 4.4 g/dL


(6.4-8.2)


 


Albumin


 


 


 


 1.2 g/dL


(3.4-5.0)


 


Albumin/Globulin Ratio    0.4 (1.0-1.7) 


 


Thyroid Stimulating Hormone


(TSH) 


 


 


 1.237 uIU/mL


(0.358-3.74)


 


Test


 6/19/20


06:30 6/19/20


12:25 6/19/20


14:43 6/19/20


23:40


 


Glucose (Fingerstick)


 102 mg/dL


(70-99) 185 mg/dL


(70-99) 


 147 mg/dL


(70-99)


 


Ionized Calcium


 


 


 1.50 mmol/L


(1.13-1.32) 





 


Iron Level


 


 


 8 ug/dL


() 





 


Total Iron Binding Capacity


 


 


 56 ug/dL


(250-450) 





 


Iron Saturation   14 % (15-34)  


 


Test


 6/20/20


05:50 6/20/20


05:55 


 





 


White Blood Count


 7.7 x10^3/uL


(4.0-11.0) 


 


 





 


Red Blood Count


 2.76 x10^6/uL


(3.50-5.40) 


 


 





 


Hemoglobin


 7.8 g/dL


(12.0-15.5) 


 


 





 


Hematocrit


 23.6 %


(36.0-47.0) 


 


 





 


Mean Corpuscular Volume 85 fL ()    


 


Mean Corpuscular Hemoglobin 28 pg (25-35)    


 


Mean Corpuscular Hemoglobin


Concent 33 g/dL


(31-37) 


 


 





 


Red Cell Distribution Width


 18.2 %


(11.5-14.5) 


 


 





 


Platelet Count


 479 x10^3/uL


(140-400) 


 


 





 


Neutrophils (%) (Auto) 74 % (31-73)    


 


Lymphocytes (%) (Auto) 16 % (24-48)    


 


Monocytes (%) (Auto) 9 % (0-9)    


 


Eosinophils (%) (Auto) 2 % (0-3)    


 


Basophils (%) (Auto) 0 % (0-3)    


 


Neutrophils # (Auto)


 5.6 x10^3/uL


(1.8-7.7) 


 


 





 


Lymphocytes # (Auto)


 1.2 x10^3/uL


(1.0-4.8) 


 


 





 


Monocytes # (Auto)


 0.7 x10^3/uL


(0.0-1.1) 


 


 





 


Eosinophils # (Auto)


 0.1 x10^3/uL


(0.0-0.7) 


 


 





 


Basophils # (Auto)


 0.0 x10^3/uL


(0.0-0.2) 


 


 





 


Sodium Level


 137 mmol/L


(136-145) 


 


 





 


Potassium Level


 3.8 mmol/L


(3.5-5.1) 


 


 





 


Chloride Level


 103 mmol/L


() 


 


 





 


Carbon Dioxide Level


 28 mmol/L


(21-32) 


 


 





 


Anion Gap 6 (6-14)    


 


Blood Urea Nitrogen


 17 mg/dL


(7-20) 


 


 





 


Creatinine


 0.6 mg/dL


(0.6-1.0) 


 


 





 


Estimated GFR


(Cockcroft-Gault) 106.3 


 


 


 





 


BUN/Creatinine Ratio 28 (6-20)    


 


Glucose Level


 134 mg/dL


(70-99) 


 


 





 


Calcium Level


 9.4 mg/dL


(8.5-10.1) 


 


 





 


Total Bilirubin


 0.4 mg/dL


(0.2-1.0) 


 


 





 


Aspartate Amino Transf


(AST/SGOT) 17 U/L (15-37) 


 


 


 





 


Alanine Aminotransferase


(ALT/SGPT) 18 U/L (14-59) 


 


 


 





 


Alkaline Phosphatase


 101 U/L


() 


 


 





 


Total Protein


 4.4 g/dL


(6.4-8.2) 


 


 





 


Albumin


 1.1 g/dL


(3.4-5.0) 


 


 





 


Albumin/Globulin Ratio 0.3 (1.0-1.7)    


 


Glucose (Fingerstick)


 


 131 mg/dL


(70-99) 


 











Laboratory Tests








Test


 6/19/20


12:25 6/19/20


14:43 6/19/20


23:40 6/20/20


05:50


 


Glucose (Fingerstick)


 185 mg/dL


(70-99) 


 147 mg/dL


(70-99) 





 


Ionized Calcium


 


 1.50 mmol/L


(1.13-1.32) 


 





 


Iron Level


 


 8 ug/dL


() 


 





 


Total Iron Binding Capacity


 


 56 ug/dL


(250-450) 


 





 


Iron Saturation  14 % (15-34)   


 


White Blood Count


 


 


 


 7.7 x10^3/uL


(4.0-11.0)


 


Red Blood Count


 


 


 


 2.76 x10^6/uL


(3.50-5.40)


 


Hemoglobin


 


 


 


 7.8 g/dL


(12.0-15.5)


 


Hematocrit


 


 


 


 23.6 %


(36.0-47.0)


 


Mean Corpuscular Volume    85 fL () 


 


Mean Corpuscular Hemoglobin    28 pg (25-35) 


 


Mean Corpuscular Hemoglobin


Concent 


 


 


 33 g/dL


(31-37)


 


Red Cell Distribution Width


 


 


 


 18.2 %


(11.5-14.5)


 


Platelet Count


 


 


 


 479 x10^3/uL


(140-400)


 


Neutrophils (%) (Auto)    74 % (31-73) 


 


Lymphocytes (%) (Auto)    16 % (24-48) 


 


Monocytes (%) (Auto)    9 % (0-9) 


 


Eosinophils (%) (Auto)    2 % (0-3) 


 


Basophils (%) (Auto)    0 % (0-3) 


 


Neutrophils # (Auto)


 


 


 


 5.6 x10^3/uL


(1.8-7.7)


 


Lymphocytes # (Auto)


 


 


 


 1.2 x10^3/uL


(1.0-4.8)


 


Monocytes # (Auto)


 


 


 


 0.7 x10^3/uL


(0.0-1.1)


 


Eosinophils # (Auto)


 


 


 


 0.1 x10^3/uL


(0.0-0.7)


 


Basophils # (Auto)


 


 


 


 0.0 x10^3/uL


(0.0-0.2)


 


Sodium Level


 


 


 


 137 mmol/L


(136-145)


 


Potassium Level


 


 


 


 3.8 mmol/L


(3.5-5.1)


 


Chloride Level


 


 


 


 103 mmol/L


()


 


Carbon Dioxide Level


 


 


 


 28 mmol/L


(21-32)


 


Anion Gap    6 (6-14) 


 


Blood Urea Nitrogen


 


 


 


 17 mg/dL


(7-20)


 


Creatinine


 


 


 


 0.6 mg/dL


(0.6-1.0)


 


Estimated GFR


(Cockcroft-Gault) 


 


 


 106.3 





 


BUN/Creatinine Ratio    28 (6-20) 


 


Glucose Level


 


 


 


 134 mg/dL


(70-99)


 


Calcium Level


 


 


 


 9.4 mg/dL


(8.5-10.1)


 


Total Bilirubin


 


 


 


 0.4 mg/dL


(0.2-1.0)


 


Aspartate Amino Transf


(AST/SGOT) 


 


 


 17 U/L (15-37) 





 


Alanine Aminotransferase


(ALT/SGPT) 


 


 


 18 U/L (14-59) 





 


Alkaline Phosphatase


 


 


 


 101 U/L


()


 


Total Protein


 


 


 


 4.4 g/dL


(6.4-8.2)


 


Albumin


 


 


 


 1.1 g/dL


(3.4-5.0)


 


Albumin/Globulin Ratio    0.3 (1.0-1.7) 


 


Test


 6/20/20


05:55 


 


 





 


Glucose (Fingerstick)


 131 mg/dL


(70-99) 


 


 











Microbiology


6/18/20 Blood Culture - Preliminary, Resulted


          NO GROWTH AFTER 1 DAY


6/15/20 Gram Stain - Final, Resulted


          


6/15/20 Aerobic and Anaerobic Culture - Preliminary, Resulted


          


6/13/20 Gram Stain Evaluation - Final, Complete


          


6/13/20 Respiratory Culture - Final, Complete


          


6/13/20 Antimicrobic Susceptibility - Final, Complete


          


6/7/20 Urine Culture - Final, Complete


         


5/30/20 Gram Stain - Final, Complete


          


5/30/20 Aerobic Culture - Final, Complete


Medications





Current Medications


Sodium Chloride 1,000 ml @  1,000 mls/hr Q1H IV  Last administered on 3/16/20at 

03:00;  Start 3/16/20 at 03:00;  Stop 3/16/20 at 03:59;  Status DC


Ondansetron HCl (Zofran) 4 mg 1X  ONCE IVP  Last administered on 3/16/20at 

03:27;  Start 3/16/20 at 03:00;  Stop 3/16/20 at 03:01;  Status DC


Morphine Sulfate (Morphine Sulfate) 4 mg 1X  ONCE IV ;  Start 3/16/20 at 03:00; 

Stop 3/16/20 at 03:01;  Status Cancel


Ketorolac Tromethamine (Toradol 30mg Vial) 30 mg 1X  ONCE IV  Last administered 

on 3/16/20at 02:54;  Start 3/16/20 at 03:00;  Stop 3/16/20 at 03:01;  Status DC


Fentanyl Citrate (Fentanyl 2ml Vial) 25 mcg 1X  ONCE IVP  Last administered on 

3/16/20at 03:23;  Start 3/16/20 at 03:30;  Stop 3/16/20 at 03:31;  Status DC


Fentanyl Citrate (Fentanyl 2ml Vial) 100 mcg STK-MED ONCE .ROUTE ;  Start 

3/16/20 at 03:18;  Stop 3/16/20 at 03:18;  Status DC


Iohexol (Omnipaque 350 Mg/ml) 90 ml 1X  ONCE IV  Last administered on 3/16/20at 

03:25;  Start 3/16/20 at 03:30;  Stop 3/16/20 at 03:31;  Status DC


Info (CONTRAST GIVEN -- Rx MONITORING) 1 each PRN DAILY  PRN MC SEE COMMENTS;  

Start 3/16/20 at 03:30;  Stop 3/18/20 at 03:29;  Status DC


Hydromorphone HCl (Dilaudid) 0.5 mg 1X  ONCE IV  Last administered on 3/16/20at 

03:55;  Start 3/16/20 at 04:30;  Stop 3/16/20 at 04:32;  Status DC


Ondansetron HCl (Zofran) 4 mg PRN Q8HRS  PRN IV NAUSEA/VOMITING 1ST CHOICE;  

Start 3/16/20 at 05:00;  Stop 3/16/20 at 09:27;  Status DC


Morphine Sulfate (Morphine Sulfate) 2 mg PRN Q2HR  PRN IV SEVERE PAIN 7-10 Last 

administered on 3/17/20at 12:26;  Start 3/16/20 at 05:00;  Stop 3/17/20 at 

14:15;  Status DC


Sodium Chloride 1,000 ml @  125 mls/hr Q8H IV  Last administered on 3/16/20at 

20:56;  Start 3/16/20 at 05:00;  Stop 3/17/20 at 04:59;  Status DC


Hydromorphone HCl (Dilaudid) 0.5 mg PRN Q3HRS  PRN IV SEVERE PAIN 7-10 Last 

administered on 3/17/20at 10:06;  Start 3/16/20 at 05:00;  Stop 3/17/20 at 

12:01;  Status DC


Piperacillin Sod/ Tazobactam Sod 4.5 gm/Sodium Chloride 100 ml @  200 mls/hr 1X 

ONCE IV  Last administered on 3/16/20at 05:44;  Start 3/16/20 at 06:00;  Stop 

3/16/20 at 06:29;  Status DC


Ondansetron HCl (Zofran) 4 mg PRN Q4HRS  PRN IV NAUSEA/VOMITING 1ST CHOICE Last 

administered on 6/17/20at 23:20;  Start 3/16/20 at 09:30


Insulin Human Lispro (HumaLOG) 0-9 UNITS Q6HRS SQ  Last administered on 

6/19/20at 12:27;  Start 3/16/20 at 09:30


Dextrose (Dextrose 50%-Water Syringe) 12.5 gm PRN Q15MIN  PRN IV SEE COMMENTS;  

Start 3/16/20 at 09:30


Pantoprazole Sodium (PROTONIX VIAL for IV PUSH) 40 mg DAILYAC IVP  Last 

administered on 6/19/20at 08:34;  Start 3/16/20 at 11:30


Prochlorperazine Edisylate (Compazine) 10 mg PRN Q6HRS  PRN IV NAUSEA/VOMITING, 

2nd CHOICE Last administered on 6/20/20at 00:13;  Start 3/16/20 at 17:45


Atenolol (Tenormin) 100 mg DAILY PO ;  Start 3/17/20 at 09:00;  Stop 3/16/20 at 

20:08;  Status DC


Metoprolol Tartrate (Lopressor Vial) 2.5 mg Q6HRS IVP  Last administered on 

3/17/20at 05:51;  Start 3/16/20 at 20:15;  Stop 3/17/20 at 10:02;  Status DC


Metoprolol Tartrate (Lopressor Vial) 5 mg Q6HRS IVP  Last administered on 3/26/

20at 00:12;  Start 3/17/20 at 10:15;  Stop 3/28/20 at 08:48;  Status DC


Hydromorphone HCl (Dilaudid) 1 mg PRN Q3HRS  PRN IV SEVERE PAIN 7-10 Last 

administered on 3/23/20at 05:13;  Start 3/17/20 at 12:00;  Stop 3/31/20 at 

00:25;  Status DC


Lidocaine HCl (Buffered Lidocaine 1%) 3 ml STK-MED ONCE .ROUTE ;  Start 3/17/20 

at 12:55;  Stop 3/17/20 at 12:56;  Status DC


Albumin Human 500 ml @  125 mls/hr 1X  ONCE IV  Last administered on 3/17/20at 

14:33;  Start 3/17/20 at 14:30;  Stop 3/17/20 at 18:32;  Status DC


Norepinephrine Bitartrate 8 mg/ Dextrose 258 ml @  17.299 mls/ hr CONT  PRN IV 

PER PROTOCOL Last administered on 4/14/20at 12:48;  Start 3/17/20 at 15:30;  

Stop 4/17/20 at 09:19;  Status DC


Sodium Chloride 1,000 ml @  125 mls/hr Q8H IV  Last administered on 3/17/20at 

21:04;  Start 3/17/20 at 16:00;  Stop 3/18/20 at 02:42;  Status DC


Albumin Human 500 ml @  125 mls/hr PRN BID  PRN IV After every 2L NSS & BP < 

90mm Last administered on 6/6/20at 11:40;  Start 3/17/20 at 16:00


Iohexol (Omnipaque 300 Mg/ml) 60 ml 1X  ONCE IV  Last administered on 3/17/20at 

17:20;  Start 3/17/20 at 17:00;  Stop 3/17/20 at 17:01;  Status DC


Info (CONTRAST GIVEN -- Rx MONITORING) 1 each PRN DAILY  PRN MC SEE COMMENTS;  

Start 3/17/20 at 17:00;  Stop 3/19/20 at 16:59;  Status DC


Meropenem 1 gm/ Sodium Chloride 100 ml @  200 mls/hr Q8HRS IV  Last administered

on 3/18/20at 05:45;  Start 3/17/20 at 20:00;  Stop 3/18/20 at 08:48;  Status DC


Furosemide (Lasix) 40 mg 1X  ONCE IVP  Last administered on 3/17/20at 22:12;  

Start 3/17/20 at 22:30;  Stop 3/17/20 at 22:31;  Status DC


Calcium Chloride 1000 mg/Sodium Chloride 110 ml @  220 mls/hr 1X  ONCE IV  Last 

administered on 3/17/20at 22:11;  Start 3/17/20 at 22:30;  Stop 3/17/20 at 

22:59;  Status DC


Albuterol Sulfate (Ventolin Neb Soln) 2.5 mg 1X  ONCE NEB  Last administered on 

3/18/20at 00:56;  Start 3/17/20 at 22:30;  Stop 3/17/20 at 22:31;  Status DC


Insulin Human Regular (HumuLIN R VIAL) 5 unit 1X  ONCE IV  Last administered on 

3/17/20at 22:14;  Start 3/17/20 at 22:30;  Stop 3/17/20 at 22:31;  Status DC


Magnesium Sulfate 50 ml @ 25 mls/hr 1X  ONCE IV  Last administered on 3/18/20at 

02:57;  Start 3/18/20 at 03:00;  Stop 3/18/20 at 04:59;  Status DC


Calcium Gluconate 1000 mg/Sodium Chloride 110 ml @  220 mls/hr 1X  ONCE IV  Last

administered on 3/18/20at 02:46;  Start 3/18/20 at 03:00;  Stop 3/18/20 at 

03:29;  Status DC


Sodium Chloride 1,000 ml @  200 mls/hr Q5H IV  Last administered on 3/18/20at 

02:46;  Start 3/18/20 at 03:00;  Stop 3/18/20 at 10:21;  Status DC


Calcium Gluconate 1000 mg/Sodium Chloride 110 ml @  220 mls/hr 1X  ONCE IV  Last

administered on 3/18/20at 03:21;  Start 3/18/20 at 03:30;  Stop 3/18/20 at 

03:59;  Status DC


Sodium Bicarbonate 50 meq/Sodium Chloride 1,050 ml @  75 mls/hr Q14H IV  Last 

administered on 3/22/20at 21:10;  Start 3/18/20 at 07:30;  Stop 3/23/20 at 

10:28;  Status DC


Calcium Gluconate 2000 mg/Sodium Chloride 120 ml @  220 mls/hr 1X  ONCE IV  Last

administered on 3/18/20at 09:05;  Start 3/18/20 at 07:30;  Stop 3/18/20 at 

08:02;  Status DC


Lidocaine HCl (Xylocaine-Mpf 1% 2ml Vial) 2 ml STK-MED ONCE .ROUTE ;  Start 

3/18/20 at 08:47;  Stop 3/18/20 at 08:47;  Status DC


Meropenem 500 mg/ Sodium Chloride 50 ml @  100 mls/hr Q12HR IV  Last 

administered on 3/23/20at 21:01;  Start 3/18/20 at 18:00;  Stop 3/24/20 at 

07:58;  Status DC


Lidocaine HCl (Buffered Lidocaine 1%) 3 ml STK-MED ONCE .ROUTE ;  Start 3/18/20 

at 09:46;  Stop 3/18/20 at 09:46;  Status DC


Lidocaine HCl (Buffered Lidocaine 1%) 6 ml 1X  ONCE INJ  Last administered on 

3/18/20at 10:26;  Start 3/18/20 at 10:15;  Stop 3/18/20 at 10:16;  Status DC


Info (Tpn Per Pharmacy) 1 each PRN DAILY  PRN MC SEE COMMENTS Last administered 

on 6/19/20at 10:54;  Start 3/18/20 at 12:00


Sodium Chloride 1,000 ml @  1,000 mls/hr Q1H PRN IV hypotension;  Start 3/18/20 

at 12:07;  Stop 3/18/20 at 18:06;  Status DC


Diphenhydramine HCl (Benadryl) 25 mg 1X PRN  PRN IV ITCHING;  Start 3/18/20 at 

12:15;  Stop 3/19/20 at 12:14;  Status DC


Diphenhydramine HCl (Benadryl) 25 mg 1X PRN  PRN IV ITCHING;  Start 3/18/20 at 

12:15;  Stop 3/19/20 at 12:14;  Status DC


Sodium Chloride 1,000 ml @  400 mls/hr Q2H30M PRN IV PATENCY;  Start 3/18/20 at 

12:07;  Stop 3/19/20 at 00:06;  Status DC


Info (PHARMACY MONITORING -- do not chart) 1 each PRN DAILY  PRN MC SEE 

COMMENTS;  Start 3/18/20 at 12:15;  Stop 3/20/20 at 08:13;  Status DC


Sodium Chloride 90 meq/Calcium Gluconate 10 meq/ Multivitamins 10 ml/Chromium/ 

Copper/Manganese/ Seleni/Zn 1 ml/ Total Parenteral Nutrition/Amino 

Acids/Dextrose/ Fat Emulsion Intravenous 55.005 ml  @ 2.292 mls/hr TPN  CONT IV 

;  Start 3/18/20 at 22:00;  Stop 3/18/20 at 12:33;  Status DC


Info (Tpn Per Pharmacy) 1 each PRN DAILY  PRN MC SEE COMMENTS;  Start 3/18/20 at

12:30;  Status UNV


Sodium Chloride 90 meq/Calcium Gluconate 10 meq/ Multivitamins 10 ml/Chromium/ 

Copper/Manganese/ Seleni/Zn 0.5 ml/ Total Parenteral Nutrition/Amino 

Acids/Dextrose/ Fat Emulsion Intravenous 1,512 ml @  63 mls/hr TPN  CONT IV  

Last administered on 3/18/20at 22:06;  Start 3/18/20 at 22:00;  Stop 3/19/20 at 

21:59;  Status DC


Calcium Carbonate/ Glycine (Tums) 500 mg PRN AFTMEALHC  PRN PO INDIGESTION;  

Start 3/18/20 at 17:45;  Stop 5/13/20 at 10:25;  Status DC


Calcium Gluconate (Calcium Gluconate) 2,000 mg 1X  ONCE IVP  Last administered 

on 3/19/20at 02:19;  Start 3/19/20 at 02:15;  Stop 3/19/20 at 02:16;  Status DC


Calcium Chloride 3000 mg/Sodium Chloride 1,030 ml @  50 mls/hr [id number] IV  Last 

administered on 3/21/20at 02:17;  Start 3/19/20 at 08:00;  Stop 3/21/20 at 

15:23;  Status DC


Lorazepam (Ativan Inj) 1 mg PRN Q4HRS  PRN IVP ANXIETY / AGITATION, 2nd choic 

Last administered on 4/17/20at 03:51;  Start 3/19/20 at 09:00;  Stop 4/17/20 at 

09:19;  Status DC


Sodium Chloride 1,000 ml @  1,000 mls/hr Q1H PRN IV hypotension;  Start 3/19/20 

at 08:56;  Stop 3/19/20 at 14:55;  Status DC


Albumin Human 200 ml @  200 mls/hr 1X PRN  PRN IV Hypotension;  Start 3/19/20 at

09:00;  Stop 3/19/20 at 14:59;  Status DC


Diphenhydramine HCl (Benadryl) 25 mg 1X PRN  PRN IV ITCHING;  Start 3/19/20 at 

09:00;  Stop 3/20/20 at 08:59;  Status DC


Diphenhydramine HCl (Benadryl) 25 mg 1X PRN  PRN IV ITCHING;  Start 3/19/20 at 

09:00;  Stop 3/20/20 at 08:59;  Status DC


Sodium Chloride 1,000 ml @  400 mls/hr Q2H30M PRN IV PATENCY;  Start 3/19/20 at 

08:56;  Stop 3/19/20 at 20:55;  Status DC


Info (PHARMACY MONITORING -- do not chart) 1 each PRN DAILY  PRN MC SEE 

COMMENTS;  Start 3/19/20 at 09:00;  Status UNV


Info (PHARMACY MONITORING -- do not chart) 1 each PRN DAILY  PRN MC SEE C

OMMENTS;  Start 3/19/20 at 09:00;  Stop 3/20/20 at 08:13;  Status DC


Digoxin (Lanoxin) 500 mcg 1X  ONCE IV  Last administered on 3/19/20at 10:04;  

Start 3/19/20 at 10:00;  Stop 3/19/20 at 10:01;  Status DC


Digoxin (Lanoxin) 125 mcg 1X  ONCE IV  Last administered on 3/19/20at 17:10;  

Start 3/19/20 at 18:00;  Stop 3/19/20 at 18:01;  Status DC


Magnesium Sulfate 100 ml @  25 mls/hr 1X  ONCE IV  Last administered on 

3/19/20at 12:48;  Start 3/19/20 at 13:00;  Stop 3/19/20 at 16:59;  Status DC


Sodium Chloride 90 meq/Magnesium Sulfate 10 meq/ Calcium Gluconate 20 meq/ 

Multivitamins 10 ml/Chromium/ Copper/Manganese/ Seleni/Zn 0.5 ml/ Total P

arenteral Nutrition/Amino Acids/Dextrose/ Fat Emulsion Intravenous 1,512 ml @  

63 mls/hr TPN  CONT IV  Last administered on 3/19/20at 22:25;  Start 3/19/20 at 

22:00;  Stop 3/20/20 at 21:59;  Status DC


Sodium Chloride 1,000 ml @  1,000 mls/hr Q1H PRN IV hypotension;  Start 3/20/20 

at 08:05;  Stop 3/20/20 at 14:04;  Status DC


Albumin Human 200 ml @  200 mls/hr 1X  ONCE IV  Last administered on 3/20/20at 

08:57;  Start 3/20/20 at 08:15;  Stop 3/20/20 at 09:14;  Status DC


Diphenhydramine HCl (Benadryl) 25 mg 1X PRN  PRN IV ITCHING;  Start 3/20/20 at 

08:15;  Stop 3/21/20 at 08:14;  Status DC


Diphenhydramine HCl (Benadryl) 25 mg 1X PRN  PRN IV ITCHING;  Start 3/20/20 at 

08:15;  Stop 3/21/20 at 08:14;  Status DC


Sodium Chloride 1,000 ml @  400 mls/hr Q2H30M PRN IV PATENCY;  Start 3/20/20 at 

08:05;  Stop 3/20/20 at 20:04;  Status DC


Info (PHARMACY MONITORING -- do not chart) 1 each PRN DAILY  PRN MC SEE 

COMMENTS;  Start 3/20/20 at 08:15;  Stop 3/24/20 at 07:57;  Status DC


Sodium Chloride 90 meq/Potassium Chloride 15 meq/ Potassium Phosphate 10 mmol/ 

Magnesium Sulfate 10 meq/Calcium Gluconate 20 meq/ Multivitamins 10 ml/Chromium/

Copper/Manganese/ Seleni/Zn 0.5 ml/ Total Parenteral Nutrition/Amino 

Acids/Dextrose/ Fat Emulsion Intravenous 1,512 ml @  63 mls/hr TPN  CONT IV  

Last administered on 3/20/20at 21:01;  Start 3/20/20 at 22:00;  Stop 3/21/20 at 

21:59;  Status DC


Potassium Chloride/Water 100 ml @  100 mls/hr 1X  ONCE IV  Last administered on 

3/20/20at 14:09;  Start 3/20/20 at 14:00;  Stop 3/20/20 at 14:59;  Status DC


Benzocaine (Hurricaine One) 1 spray 1X  ONCE MM  Last administered on 3/20/20at 

16:38;  Start 3/20/20 at 14:30;  Stop 3/20/20 at 14:31;  Status DC


Lidocaine HCl (Glydo (Lidocaine) Jelly) 1 thomas 1X  ONCE MM  Last administered on 

3/20/20at 16:38;  Start 3/20/20 at 14:30;  Stop 3/20/20 at 14:31;  Status DC


Linezolid/Dextrose 300 ml @  300 mls/hr Q12HR IV  Last administered on 3/26/20at

21:04;  Start 3/20/20 at 20:00;  Stop 3/27/20 at 07:50;  Status DC


Acetaminophen (Tylenol) 650 mg PRN Q6HRS  PRN PO MILD PAIN / TEMP;  Start 

3/21/20 at 03:30;  Stop 3/21/20 at 03:36;  Status DC


Acetaminophen (Tylenol) 650 mg PRN Q6HRS  PRN PEG MILD PAIN / TEMP Last 

administered on 4/16/20at 19:56;  Start 3/21/20 at 03:36;  Stop 5/13/20 at 

10:25;  Status DC


Sodium Chloride 1,000 ml @  1,000 mls/hr Q1H PRN IV hypotension;  Start 3/21/20 

at 07:50;  Stop 3/21/20 at 13:49;  Status DC


Albumin Human 200 ml @  200 mls/hr 1X PRN  PRN IV Hypotension;  Start 3/21/20 at

08:00;  Stop 3/21/20 at 13:59;  Status DC


Sodium Chloride (Normal Saline Flush) 10 ml 1X PRN  PRN IV AP catheter pack;  

Start 3/21/20 at 08:00;  Stop 3/22/20 at 07:59;  Status DC


Sodium Chloride (Normal Saline Flush) 10 ml 1X PRN  PRN IV  catheter pack;  

Start 3/21/20 at 08:00;  Stop 3/22/20 at 07:59;  Status DC


Sodium Chloride 1,000 ml @  400 mls/hr Q2H30M PRN IV PATENCY;  Start 3/21/20 at 

07:50;  Stop 3/21/20 at 19:49;  Status DC


Info (PHARMACY MONITORING -- do not chart) 1 each PRN DAILY  PRN MC SEE 

COMMENTS;  Start 3/21/20 at 08:00;  Status UNV


Info (PHARMACY MONITORING -- do not chart) 1 each PRN DAILY  PRN MC SEE 

COMMENTS;  Start 3/21/20 at 08:00;  Stop 3/23/20 at 08:25;  Status DC


Sodium Chloride 90 meq/Potassium Chloride 15 meq/ Potassium Phosphate 10 mmol/ 

Magnesium Sulfate 10 meq/Calcium Gluconate 20 meq/ Multivitamins 10 ml/Chromium/

Copper/Manganese/ Seleni/Zn 0.5 ml/ Total Parenteral Nutrition/Amino 

Acids/Dextrose/ Fat Emulsion Intravenous 1,512 ml @  63 mls/hr TPN  CONT IV  

Last administered on 3/21/20at 20:57;  Start 3/21/20 at 22:00;  Stop 3/22/20 at 

21:59;  Status DC


Sodium Chloride 90 meq/Potassium Chloride 15 meq/ Potassium Phosphate 15 mmol/ 

Magnesium Sulfate 10 meq/Calcium Gluconate 20 meq/ Multivitamins 10 ml/Chromium/

Copper/Manganese/ Seleni/Zn 0.5 ml/ Total Parenteral Nutrition/Amino 

Acids/Dextrose/ Fat Emulsion Intravenous 1,512 ml @  63 mls/hr TPN  CONT IV ;  

Start 3/22/20 at 22:00;  Stop 3/22/20 at 14:16;  Status DC


Sodium Chloride 90 meq/Potassium Chloride 15 meq/ Potassium Phosphate 15 mmol/ 

Magnesium Sulfate 10 meq/Calcium Gluconate 20 meq/ Multivitamins 10 ml/Chromium/

Copper/Manganese/ Seleni/Zn 0.5 ml/ Total Parenteral Nutrition/Amino 

Acids/Dextrose/ Fat Emulsion Intravenous 1,200 ml @  50 mls/hr TPN  CONT IV ;  

Start 3/22/20 at 22:00;  Stop 3/22/20 at 14:17;  Status DC


Sodium Chloride 90 meq/Potassium Chloride 15 meq/ Potassium Phosphate 10 mmol/ 

Magnesium Sulfate 10 meq/Calcium Gluconate 20 meq/ Multivitamins 10 ml/Chromium/

Copper/Manganese/ Seleni/Zn 0.5 ml/ Total Parenteral Nutrition/Amino 

Acids/Dextrose/ Fat Emulsion Intravenous 1,200 ml @  50 mls/hr TPN  CONT IV  

Last administered on 3/22/20at 23:29;  Start 3/22/20 at 22:00;  Stop 3/23/20 at 

21:59;  Status DC


Sodium Chloride 1,000 ml @  1,000 mls/hr Q1H PRN IV hypotension;  Start 3/23/20 

at 07:28;  Stop 3/23/20 at 13:27;  Status DC


Albumin Human 200 ml @  200 mls/hr 1X  ONCE IV  Last administered on 3/23/20at 

08:51;  Start 3/23/20 at 07:30;  Stop 3/23/20 at 08:29;  Status DC


Diphenhydramine HCl (Benadryl) 25 mg 1X PRN  PRN IV ITCHING;  Start 3/23/20 at 

07:30;  Stop 3/24/20 at 07:29;  Status DC


Diphenhydramine HCl (Benadryl) 25 mg 1X PRN  PRN IV ITCHING;  Start 3/23/20 at 

07:30;  Stop 3/24/20 at 07:29;  Status DC


Sodium Chloride 1,000 ml @  400 mls/hr Q2H30M PRN IV PATENCY;  Start 3/23/20 at 

07:28;  Stop 3/23/20 at 19:27;  Status DC


Info (PHARMACY MONITORING -- do not chart) 1 each PRN DAILY  PRN MC SEE 

COMMENTS;  Start 3/23/20 at 07:30;  Stop 4/3/20 at 13:01;  Status DC


Metronidazole 100 ml @  100 mls/hr Q6HRS IV  Last administered on 4/8/20at 

06:26;  Start 3/23/20 at 08:30;  Stop 4/8/20 at 09:58;  Status DC


Micafungin Sodium 100 mg/Dextrose 100 ml @  100 mls/hr Q24H IV  Last 

administered on 4/30/20at 08:18;  Start 3/23/20 at 09:00;  Stop 4/30/20 at 

20:58;  Status DC


Propofol 0 ml @ As Directed STK-MED ONCE IV ;  Start 3/23/20 at 07:53;  Stop 3/

23/20 at 07:53;  Status DC


Etomidate (Amidate) 20 mg STK-MED ONCE IV ;  Start 3/23/20 at 07:53;  Stop 

3/23/20 at 07:54;  Status DC


Midazolam HCl (Versed) 5 mg STK-MED ONCE .ROUTE ;  Start 3/23/20 at 07:57;  Stop

3/23/20 at 07:57;  Status DC


Fentanyl Citrate 30 ml @ 0 mls/hr CONT  PRN IV SEE PROTOCOL Last administered on

4/17/20at 06:12;  Start 3/23/20 at 08:15;  Stop 4/17/20 at 09:19;  Status DC


Artificial Tears (Artificial Tears) 1 drop PRN Q1HR  PRN OU DRY EYE, 1st choice;

 Start 3/23/20 at 08:15;  Stop 4/29/20 at 05:31;  Status DC


Midazolam HCl 50 mg/Sodium Chloride 50 ml @ 0 mls/hr CONT  PRN IV SEE PROTOCOL 

Last administered on 3/26/20at 22:39;  Start 3/23/20 at 08:15;  Stop 3/28/20 at 

15:59;  Status DC


Etomidate (Amidate) 8 mg 1X  ONCE IV  Last administered on 3/23/20at 08:33;  

Start 3/23/20 at 08:30;  Stop 3/23/20 at 08:31;  Status DC


Succinylcholine Chloride (Anectine) 120 mg 1X  ONCE IV  Last administered on 

3/23/20at 08:34;  Start 3/23/20 at 08:30;  Stop 3/23/20 at 08:31;  Status DC


Midazolam HCl (Versed) 5 mg 1X  ONCE IV ;  Start 3/23/20 at 08:30;  Stop 3/23/20

at 08:31;  Status DC


Potassium Chloride 15 meq/ Bicarbonate Dialysis Soln w/ out KCl 5,007.5 ml  @ 

1,000 mls/ hr Q5H1M IV  Last administered on 3/24/20at 11:11;  Start 3/23/20 at 

12:00;  Stop 3/24/20 at 11:15;  Status DC


Potassium Chloride 15 meq/ Bicarbonate Dialysis Soln w/ out KCl 5,007.5 ml  @ 

1,000 mls/ hr Q5H1M IV  Last administered on 3/24/20at 11:12;  Start 3/23/20 at 

12:00;  Stop 3/24/20 at 11:17;  Status DC


Potassium Chloride 15 meq/ Bicarbonate Dialysis Soln w/ out KCl 5,007.5 ml  @ 

1,000 mls/ hr Q5H1M IV  Last administered on 3/24/20at 11:11;  Start 3/23/20 at 

12:00;  Stop 3/24/20 at 11:19;  Status DC


Sodium Chloride 90 meq/Potassium Chloride 15 meq/ Potassium Phosphate 10 mmol/ 

Magnesium Sulfate 10 meq/Calcium Gluconate 20 meq/ Multivitamins 10 ml/Chromium/

Copper/Manganese/ Seleni/Zn 0.5 ml/ Total Parenteral Nutrition/Amino 

Acids/Dextrose/ Fat Emulsion Intravenous 1,400 ml @  58.333 mls/ hr TPN  CONT IV

 Last administered on 3/23/20at 21:42;  Start 3/23/20 at 22:00;  Stop 3/24/20 at

21:59;  Status DC


Heparin Sodium (Porcine) (Heparin Sodium) 5,000 unit Q8HRS SQ  Last administered

on 3/28/20at 05:55;  Start 3/23/20 at 15:00;  Stop 3/28/20 at 13:28;  Status DC


Meropenem 500 mg/ Sodium Chloride 50 ml @  100 mls/hr Q6HRS IV  Last 

administered on 3/25/20at 06:00;  Start 3/24/20 at 09:00;  Stop 3/25/20 at 

07:29;  Status DC


Potassium Phosphate 20 mmol/ Sodium Chloride 106.6667 ml @  51.667 m... 1X  ONCE

IV  Last administered on 3/24/20at 11:22;  Start 3/24/20 at 10:15;  Stop 3/24/20

at 12:18;  Status DC


Acetaminophen (Tylenol Supp) 650 mg PRN Q6HRS  PRN GA MILD PAIN / TEMP > 100.3'F

Last administered on 6/18/20at 10:16;  Start 3/24/20 at 10:30


Potassium Chloride/Water 100 ml @  100 mls/hr Q1H IV  Last administered on 

3/24/20at 12:12;  Start 3/24/20 at 11:00;  Stop 3/24/20 at 12:59;  Status DC


Potassium Chloride 20 meq/ Bicarbonate Dialysis Soln w/ out KCl 5,010 ml @  

1,000 mls/hr Q5H1M IV  Last administered on 3/25/20at 08:48;  Start 3/24/20 at 

12:00;  Stop 3/25/20 at 13:03;  Status DC


Potassium Chloride 20 meq/ Bicarbonate Dialysis Soln w/ out KCl 5,010 ml @  

1,000 mls/hr Q5H1M IV  Last administered on 3/29/20at 14:52;  Start 3/24/20 at 

11:30;  Stop 3/29/20 at 19:59;  Status DC


Potassium Chloride 20 meq/ Bicarbonate Dialysis Soln w/ out KCl 5,010 ml @  

1,000 mls/hr Q5H1M IV  Last administered on 3/29/20at 14:53;  Start 3/24/20 at 

11:30;  Stop 3/29/20 at 19:59;  Status DC


Sodium Chloride 90 meq/Potassium Chloride 15 meq/ Potassium Phosphate 15 mmol/ 

Magnesium Sulfate 10 meq/Calcium Gluconate 15 meq/ Multivitamins 10 ml/Chromium/

Copper/Manganese/ Seleni/Zn 0.5 ml/ Total Parenteral Nutrition/Amino 

Acids/Dextrose/ Fat Emulsion Intravenous 1,400 ml @  58.333 mls/ hr TPN  CONT IV

 Last administered on 3/24/20at 22:17;  Start 3/24/20 at 22:00;  Stop 3/25/20 at

21:59;  Status DC


Cefepime HCl (Maxipime) 2 gm Q12HR IVP  Last administered on 4/7/20at 20:56;  

Start 3/25/20 at 09:00;  Stop 4/8/20 at 09:58;  Status DC


Daptomycin 500 mg/ Sodium Chloride 50 ml @  100 mls/hr Q48H IV  Last 

administered on 4/10/20at 09:57;  Start 3/25/20 at 08:30;  Stop 4/10/20 at 

10:07;  Status DC


Lidocaine HCl (Buffered Lidocaine 1%) 3 ml 1X  ONCE INJ  Last administered on 

3/25/20at 10:27;  Start 3/25/20 at 10:30;  Stop 3/25/20 at 10:31;  Status DC


Potassium Phosphate 20 mmol/ Sodium Chloride 106.6667 ml @  51.667 m... 1X  ONCE

IV  Last administered on 3/25/20at 12:51;  Start 3/25/20 at 13:00;  Stop 3/25/20

at 15:03;  Status DC


Sodium Chloride 90 meq/Potassium Chloride 15 meq/ Potassium Phosphate 18 mmol/ 

Magnesium Sulfate 8 meq/Calcium Gluconate 15 meq/ Multivitamins 10 ml/Chromium/ 

Copper/Manganese/ Seleni/Zn 0.5 ml/ Total Parenteral Nutrition/Amino 

Acids/Dextrose/ Fat Emulsion Intravenous 1,400 ml @  58.333 mls/ hr TPN  CONT IV

 Last administered on 3/25/20at 22:16;  Start 3/25/20 at 22:00;  Stop 3/26/20 at

21:59;  Status DC


Potassium Chloride 20 meq/ Bicarbonate Dialysis Soln w/ out KCl 5,010 ml @  

1,000 mls/hr Q5H1M IV  Last administered on 3/29/20at 14:54;  Start 3/25/20 at 

16:00;  Stop 3/29/20 at 19:59;  Status DC


Multi-Ingred Cream/Lotion/Oil/ Oint (Artificial Tears Eye Ointment) 1 thomas PRN 

Q1HR  PRN OU DRY EYE, 2nd choice Last administered on 4/13/20at 08:19;  Start 

3/25/20 at 17:30;  Stop 6/3/20 at 14:39;  Status DC


Sodium Chloride 90 meq/Potassium Chloride 15 meq/ Potassium Phosphate 18 mmol/ 

Magnesium Sulfate 8 meq/Calcium Gluconate 15 meq/ Multivitamins 10 ml/Chromium/ 

Copper/Manganese/ Seleni/Zn 0.5 ml/ Total Parenteral Nutrition/Amino 

Acids/Dextrose/ Fat Emulsion Intravenous 1,400 ml @  58.333 mls/ hr TPN  CONT IV

 Last administered on 3/26/20at 22:00;  Start 3/26/20 at 22:00;  Stop 3/27/20 at

21:59;  Status DC


Albumin Human 500 ml @  125 mls/hr 1X  ONCE IV ;  Start 3/26/20 at 14:15;  Stop 

3/26/20 at 18:14;  Status DC


Sodium Chloride 90 meq/Potassium Chloride 15 meq/ Potassium Phosphate 18 mmol/ 

Magnesium Sulfate 8 meq/Calcium Gluconate 15 meq/ Multivitamins 10 ml/Chromium/ 

Copper/Manganese/ Seleni/Zn 0.5 ml/ Insulin Human Regular 10 unit/ Total 

Parenteral Nutrition/Amino Acids/Dextrose/ Fat Emulsion Intravenous 1,400 ml @  

58.333 mls/ hr TPN  CONT IV  Last administered on 3/27/20at 21:43;  Start 3

/27/20 at 22:00;  Stop 3/28/20 at 21:59;  Status DC


Lidocaine HCl (Buffered Lidocaine 1%) 3 ml STK-MED ONCE .ROUTE ;  Start 3/25/20 

at 10:00;  Stop 3/27/20 at 13:57;  Status DC


Midazolam HCl 100 mg/Sodium Chloride 100 ml @ 7 mls/hr CONT  PRN IV SEE PROTOCOL

Last administered on 4/8/20at 15:35;  Start 3/28/20 at 16:00;  Stop 6/3/20 at 

14:38;  Status DC


Sodium Chloride 90 meq/Potassium Chloride 15 meq/ Potassium Phosphate 18 mmol/ 

Magnesium Sulfate 8 meq/Calcium Gluconate 15 meq/ Multivitamins 10 ml/Chromium/ 

Copper/Manganese/ Seleni/Zn 0.5 ml/ Insulin Human Regular 15 unit/ Total 

Parenteral Nutrition/Amino Acids/Dextrose/ Fat Emulsion Intravenous 1,400 ml @  

58.333 mls/ hr TPN  CONT IV  Last administered on 3/28/20at 20:34;  Start 

3/28/20 at 22:00;  Stop 3/29/20 at 21:59;  Status DC


Info ([facility] Electrolyte Protocol) 1 ea CONT PRN  PRN MC PER PROTOCOL;  Start 

3/29/20 at 13:15


Sodium Chloride 90 meq/Potassium Chloride 15 meq/ Potassium Phosphate 18 mmol/ 

Magnesium Sulfate 8 meq/Calcium Gluconate 15 meq/ Multivitamins 10 ml/Chromium/ 

Copper/Manganese/ Seleni/Zn 0.5 ml/ Insulin Human Regular 15 unit/ Total 

Parenteral Nutrition/Amino Acids/Dextrose/ Fat Emulsion Intravenous 1,400 ml @  

58.333 mls/ hr TPN  CONT IV  Last administered on 3/29/20at 22:05;  Start 

3/29/20 at 22:00;  Stop 3/30/20 at 21:59;  Status DC


Potassium Chloride 15 meq/ Bicarbonate Dialysis Soln w/ out KCl 5,007.5 ml  @ 

1,000 mls/ hr Q5H1M IV  Last administered on 4/1/20at 18:14;  Start 3/29/20 at 

20:00;  Stop 4/2/20 at 13:08;  Status DC


Potassium Chloride 15 meq/ Bicarbonate Dialysis Soln w/ out KCl 5,007.5 ml  @ 

1,000 mls/ hr Q5H1M IV  Last administered on 4/1/20at 18:14;  Start 3/29/20 at 

20:00;  Stop 4/2/20 at 13:08;  Status DC


Potassium Chloride 15 meq/ Bicarbonate Dialysis Soln w/ out KCl 5,007.5 ml  @ 

1,000 mls/ hr Q5H1M IV  Last administered on 4/1/20at 18:14;  Start 3/29/20 at 

20:00;  Stop 4/2/20 at 13:08;  Status DC


Iohexol (Omnipaque 240 Mg/ml) 30 ml 1X  ONCE PO  Last administered on 3/30/20at 

11:30;  Start 3/30/20 at 11:30;  Stop 3/30/20 at 11:33;  Status DC


Info (CONTRAST GIVEN -- Rx MONITORING) 1 each PRN DAILY  PRN MC SEE COMMENTS;  

Start 3/30/20 at 11:45;  Stop 4/1/20 at 11:44;  Status DC


Sodium Chloride 90 meq/Potassium Chloride 15 meq/ Potassium Phosphate 18 mmol/ 

Magnesium Sulfate 8 meq/Calcium Gluconate 15 meq/ Multivitamins 10 ml/Chromium/ 

Copper/Manganese/ Seleni/Zn 0.5 ml/ Insulin Human Regular 15 unit/ Total 

Parenteral Nutrition/Amino Acids/Dextrose/ Fat Emulsion Intravenous 1,400 ml @  

58.333 mls/ hr TPN  CONT IV  Last administered on 3/30/20at 21:47;  Start 

3/30/20 at 22:00;  Stop 3/31/20 at 21:59;  Status DC


Sodium Chloride 90 meq/Potassium Chloride 15 meq/ Potassium Phosphate 18 mmol/ 

Magnesium Sulfate 8 meq/Calcium Gluconate 15 meq/ Multivitamins 10 ml/Chromium/ 

Copper/Manganese/ Seleni/Zn 0.5 ml/ Insulin Human Regular 20 unit/ Total 

Parenteral Nutrition/Amino Acids/Dextrose/ Fat Emulsion Intravenous 1,400 ml @  

58.333 mls/ hr TPN  CONT IV  Last administered on 3/31/20at 21:36;  Start 

3/31/20 at 22:00;  Stop 4/1/20 at 21:59;  Status DC


Alteplase, Recombinant (Cathflo For Central Catheter Clearance) 1 mg 1X  ONCE 

INT CAT  Last administered on 3/31/20at 20:03;  Start 3/31/20 at 19:30;  Stop 

3/31/20 at 19:46;  Status DC


Alteplase, Recombinant (Cathflo For Central Catheter Clearance) 1 mg 1X  ONCE 

INT CAT  Last administered on 3/31/20at 22:05;  Start 3/31/20 at 22:00;  Stop 

3/31/20 at 22:01;  Status DC


Sodium Chloride 90 meq/Potassium Chloride 15 meq/ Potassium Phosphate 18 mmol/ 

Magnesium Sulfate 8 meq/Calcium Gluconate 15 meq/ Multivitamins 10 ml/Chromium/ 

Copper/Manganese/ Seleni/Zn 0.5 ml/ Insulin Human Regular 20 unit/ Total 

Parenteral Nutrition/Amino Acids/Dextrose/ Fat Emulsion Intravenous 1,400 ml @  

58.333 mls/ hr TPN  CONT IV  Last administered on 4/1/20at 21:30;  Start 4/1/20 

at 22:00;  Stop 4/2/20 at 21:59;  Status DC


Dexmedetomidine HCl 400 mcg/ Sodium Chloride 100 ml @ 0 mls/hr CONT  PRN IV 

ANXIETY / AGITATION Last administered on 5/30/20at 12:57;  Start 4/2/20 at 

08:15;  Stop 5/30/20 at 18:31;  Status DC


Sodium Chloride 500 ml @  500 mls/hr 1X PRN  PRN IV ELEVATED BP, SEE COMMENTS;  

Start 4/2/20 at 08:15


Atropine Sulfate (ATROPINE 0.5mg SYRINGE) 0.5 mg PRN Q5MIN  PRN IV SEE COMMENTS;

 Start 4/2/20 at 08:15


Furosemide (Lasix) 20 mg 1X  ONCE IVP  Last administered on 4/2/20at 08:19;  

Start 4/2/20 at 08:15;  Stop 4/2/20 at 08:16;  Status DC


Lidocaine HCl (Buffered Lidocaine 1%) 3 ml STK-MED ONCE .ROUTE ;  Start 4/2/20 

at 08:39;  Stop 4/2/20 at 08:39;  Status DC


Lidocaine HCl (Buffered Lidocaine 1%) 6 ml 1X  ONCE INJ  Last administered on 

4/2/20at 09:05;  Start 4/2/20 at 09:00;  Stop 4/2/20 at 09:06;  Status DC


Sodium Chloride 90 meq/Potassium Chloride 15 meq/ Potassium Phosphate 18 mmol/ 

Magnesium Sulfate 8 meq/Calcium Gluconate 15 meq/ Multivitamins 10 ml/Chromium/ 

Copper/Manganese/ Seleni/Zn 0.5 ml/ Insulin Human Regular 20 unit/ Total 

Parenteral Nutrition/Amino Acids/Dextrose/ Fat Emulsion Intravenous 1,400 ml @  

58.333 mls/ hr TPN  CONT IV  Last administered on 4/2/20at 22:45;  Start 4/2/20 

at 22:00;  Stop 4/3/20 at 21:59;  Status DC


Sodium Chloride 1,000 ml @  1,000 mls/hr Q1H PRN IV hypotension;  Start 4/3/20 

at 07:30;  Stop 4/3/20 at 13:29;  Status DC


Albumin Human 200 ml @  200 mls/hr 1X PRN  PRN IV Hypotension Last administered 

on 4/3/20at 09:36;  Start 4/3/20 at 07:30;  Stop 4/3/20 at 13:29;  Status DC


Sodium Chloride (Normal Saline Flush) 10 ml 1X PRN  PRN IV AP catheter pack;  

Start 4/3/20 at 07:30;  Stop 4/3/20 at 21:29;  Status DC


Sodium Chloride (Normal Saline Flush) 10 ml 1X PRN  PRN IV  catheter pack;  

Start 4/3/20 at 07:30;  Stop 4/4/20 at 07:29;  Status DC


Sodium Chloride 1,000 ml @  400 mls/hr Q2H30M PRN IV PATENCY;  Start 4/3/20 at 

07:30;  Stop 4/3/20 at 19:29;  Status DC


Info (PHARMACY MONITORING -- do not chart) 1 each PRN DAILY  PRN MC SEE 

COMMENTS;  Start 4/3/20 at 07:30;  Stop 4/3/20 at 13:02;  Status DC


Info (PHARMACY MONITORING -- do not chart) 1 each PRN DAILY  PRN MC SEE COMME

NTS;  Start 4/3/20 at 07:30;  Stop 4/5/20 at 12:45;  Status DC


Sodium Chloride 90 meq/Potassium Chloride 15 meq/ Potassium Phosphate 10 mmol/ 

Magnesium Sulfate 8 meq/Calcium Gluconate 15 meq/ Multivitamins 10 ml/Chromium/ 

Copper/Manganese/ Seleni/Zn 0.5 ml/ Insulin Human Regular 25 unit/ Total 

Parenteral Nutrition/Amino Acids/Dextrose/ Fat Emulsion Intravenous 1,400 ml @  

58.333 mls/ hr TPN  CONT IV  Last administered on 4/3/20at 22:19;  Start 4/3/20 

at 22:00;  Stop 4/4/20 at 21:59;  Status DC


Heparin Sodium (Porcine) (Heparin Sodium) 5,000 unit Q12HR SQ  Last administered

on 4/26/20at 08:59;  Start 4/3/20 at 21:00;  Stop 4/26/20 at 10:05;  Status DC


Ondansetron HCl (Zofran) 4 mg PRN Q6HRS  PRN IV NAUSEA/VOMITING;  Start 4/6/20 

at 07:00;  Stop 4/7/20 at 06:59;  Status DC


Fentanyl Citrate (Fentanyl 2ml Vial) 25 mcg PRN Q5MIN  PRN IV MILD PAIN 1-3;  

Start 4/6/20 at 07:00;  Stop 4/7/20 at 06:59;  Status DC


Fentanyl Citrate (Fentanyl 2ml Vial) 50 mcg PRN Q5MIN  PRN IV MODERATE TO SEVERE

PAIN;  Start 4/6/20 at 07:00;  Stop 4/7/20 at 06:59;  Status DC


Ringer's Solution 1,000 ml @  30 mls/hr Q24H IV ;  Start 4/6/20 at 07:00;  Stop 

4/6/20 at 18:59;  Status DC


Lidocaine HCl (Xylocaine-Mpf 1% 2ml Vial) 2 ml PRN 1X  PRN ID PRIOR TO IV START;

 Start 4/6/20 at 07:00;  Stop 4/7/20 at 06:59;  Status DC


Prochlorperazine Edisylate (Compazine) 5 mg PACU PRN  PRN IV NAUSEA, MRX1;  

Start 4/6/20 at 07:00;  Stop 4/7/20 at 06:59;  Status DC


Sodium Chloride 1,000 ml @  1,000 mls/hr Q1H PRN IV hypotension;  Start 4/4/20 

at 09:10;  Stop 4/4/20 at 15:09;  Status DC


Albumin Human 200 ml @  200 mls/hr 1X PRN  PRN IV Hypotension Last administered 

on 4/4/20at 10:10;  Start 4/4/20 at 09:15;  Stop 4/4/20 at 15:14;  Status DC


Sodium Chloride 1,000 ml @  400 mls/hr Q2H30M PRN IV PATENCY;  Start 4/4/20 at 

09:10;  Stop 4/4/20 at 21:09;  Status DC


Info (PHARMACY MONITORING -- do not chart) 1 each PRN DAILY  PRN MC SEE MIKEY

TS;  Start 4/4/20 at 09:15;  Stop 4/5/20 at 12:45;  Status DC


Info (PHARMACY MONITORING -- do not chart) 1 each PRN DAILY  PRN MC SEE 

COMMENTS;  Start 4/4/20 at 09:15;  Stop 4/5/20 at 12:45;  Status DC


Sodium Chloride 90 meq/Potassium Chloride 15 meq/ Potassium Phosphate 10 mmol/ 

Magnesium Sulfate 8 meq/Calcium Gluconate 15 meq/ Multivitamins 10 ml/Chromium/ 

Copper/Manganese/ Seleni/Zn 0.5 ml/ Insulin Human Regular 25 unit/ Total 

Parenteral Nutrition/Amino Acids/Dextrose/ Fat Emulsion Intravenous 1,400 ml @  

58.333 mls/ hr TPN  CONT IV  Last administered on 4/4/20at 22:10;  Start 4/4/20 

at 22:00;  Stop 4/5/20 at 21:59;  Status DC


Magnesium Sulfate 50 ml @ 25 mls/hr PRN DAILY  PRN IV for Mag < 1.7 on am labs 

Last administered on 6/18/20at 10:57;  Start 4/5/20 at 09:15


Sodium Chloride 90 meq/Potassium Chloride 15 meq/ Potassium Phosphate 10 mmol/ 

Magnesium Sulfate 8 meq/Calcium Gluconate 15 meq/ Multivitamins 10 ml/Chromium/ 

Copper/Manganese/ Seleni/Zn 0.5 ml/ Insulin Human Regular 25 unit/ Total 

Parenteral Nutrition/Amino Acids/Dextrose/ Fat Emulsion Intravenous 1,400 ml @  

58.333 mls/ hr TPN  CONT IV  Last administered on 4/5/20at 21:20;  Start 4/5/20 

at 22:00;  Stop 4/6/20 at 21:59;  Status DC


Sodium Chloride 1,000 ml @  1,000 mls/hr Q1H PRN IV hypotension;  Start 4/5/20 

at 12:23;  Stop 4/5/20 at 18:22;  Status DC


Albumin Human 200 ml @  200 mls/hr 1X  ONCE IV  Last administered on 4/5/20at 

13:34;  Start 4/5/20 at 12:30;  Stop 4/5/20 at 13:29;  Status DC


Diphenhydramine HCl (Benadryl) 25 mg 1X PRN  PRN IV ITCHING;  Start 4/5/20 at 

12:30;  Stop 4/6/20 at 12:29;  Status DC


Diphenhydramine HCl (Benadryl) 25 mg 1X PRN  PRN IV ITCHING;  Start 4/5/20 at 

12:30;  Stop 4/6/20 at 12:29;  Status DC


Info (PHARMACY MONITORING -- do not chart) 1 each PRN DAILY  PRN MC SEE CO

MMENTS;  Start 4/5/20 at 12:30;  Status Cancel


Bupivacaine HCl/ Epinephrine Bitart (Sensorcain-Epi 0.5%-1:542201 Mpf) 30 ml 

STK-MED ONCE .ROUTE  Last administered on 4/6/20at 11:44;  Start 4/6/20 at 

11:00;  Stop 4/6/20 at 11:01;  Status DC


Cellulose (Surgicel Fibrillar 1x2) 1 each STK-MED ONCE .ROUTE ;  Start 4/6/20 at

11:00;  Stop 4/6/20 at 11:01;  Status DC


Sodium Chloride 90 meq/Potassium Chloride 15 meq/ Potassium Phosphate 10 mmol/ 

Magnesium Sulfate 12 meq/Calcium Gluconate 15 meq/ Multivitamins 10 ml/Chromium/

Copper/Manganese/ Seleni/Zn 0.5 ml/ Insulin Human Regular 25 unit/ Total 

Parenteral Nutrition/Amino Acids/Dextrose/ Fat Emulsion Intravenous 1,400 ml @  

58.333 mls/ hr TPN  CONT IV  Last administered on 4/6/20at 22:24;  Start 4/6/20 

at 22:00;  Stop 4/7/20 at 21:59;  Status DC


Propofol 20 ml @ As Directed STK-MED ONCE IV ;  Start 4/6/20 at 11:07;  Stop 

4/6/20 at 11:07;  Status DC


Cellulose (Surgicel Hemostat 4x8) 1 each STK-MED ONCE .ROUTE  Last administered 

on 4/6/20at 11:44;  Start 4/6/20 at 11:55;  Stop 4/6/20 at 11:56;  Status DC


Sevoflurane (Ultane) 60 ml STK-MED ONCE IH ;  Start 4/6/20 at 12:46;  Stop 

4/6/20 at 12:46;  Status DC


Sodium Chloride 1,000 ml @  1,000 mls/hr Q1H PRN IV hypotension;  Start 4/6/20 

at 13:51;  Stop 4/6/20 at 19:50;  Status DC


Albumin Human 200 ml @  200 mls/hr 1X PRN  PRN IV Hypotension Last administered 

on 4/6/20at 14:51;  Start 4/6/20 at 14:00;  Stop 4/6/20 at 19:59;  Status DC


Diphenhydramine HCl (Benadryl) 25 mg 1X PRN  PRN IV ITCHING;  Start 4/6/20 at 

14:00;  Stop 4/7/20 at 13:59;  Status DC


Diphenhydramine HCl (Benadryl) 25 mg 1X PRN  PRN IV ITCHING;  Start 4/6/20 at 

14:00;  Stop 4/7/20 at 13:59;  Status DC


Sodium Chloride 1,000 ml @  400 mls/hr Q2H30M PRN IV PATENCY;  Start 4/6/20 at 

13:51;  Stop 4/7/20 at 01:50;  Status DC


Info (PHARMACY MONITORING -- do not chart) 1 each PRN DAILY  PRN MC SEE 

COMMENTS;  Start 4/6/20 at 14:00;  Stop 4/9/20 at 08:16;  Status DC


Heparin Sodium (Porcine) (Hep Lock Adult) 500 unit STK-MED ONCE IVP ;  Start 

4/7/20 at 09:29;  Stop 4/7/20 at 09:30;  Status DC


Sodium Chloride 1,000 ml @  1,000 mls/hr Q1H PRN IV hypotension;  Start 4/7/20 

at 10:43;  Stop 4/7/20 at 16:42;  Status DC


Sodium Chloride 1,000 ml @  400 mls/hr Q2H30M PRN IV PATENCY;  Start 4/7/20 at 

10:43;  Stop 4/7/20 at 22:42;  Status DC


Info (PHARMACY MONITORING -- do not chart) 1 each PRN DAILY  PRN MC SEE 

COMMENTS;  Start 4/7/20 at 10:45;  Status UNV


Info (PHARMACY MONITORING -- do not chart) 1 each PRN DAILY  PRN MC SEE 

COMMENTS;  Start 4/7/20 at 10:45;  Status UNV


Sodium Chloride 90 meq/Potassium Chloride 15 meq/ Magnesium Sulfate 12 

meq/Calcium Gluconate 15 meq/ Multivitamins 10 ml/Chromium/ Copper/Manganese/ 

Seleni/Zn 0.5 ml/ Insulin Human Regular 25 unit/ Total Parenteral 

Nutrition/Amino Acids/Dextrose/ Fat Emulsion Intravenous 1,400 ml @  58.333 mls/

hr TPN  CONT IV  Last administered on 4/7/20at 22:13;  Start 4/7/20 at 22:00;  

Stop 4/8/20 at 21:59;  Status DC


Sodium Chloride 1,000 ml @  1,000 mls/hr Q1H PRN IV hypotension;  Start 4/8/20 

at 07:50;  Stop 4/8/20 at 13:49;  Status DC


Albumin Human 200 ml @  200 mls/hr 1X  ONCE IV ;  Start 4/8/20 at 08:00;  Stop 

4/8/20 at 08:53;  Status DC


Diphenhydramine HCl (Benadryl) 25 mg 1X PRN  PRN IV ITCHING;  Start 4/8/20 at 

08:00;  Stop 4/9/20 at 07:59;  Status DC


Diphenhydramine HCl (Benadryl) 25 mg 1X PRN  PRN IV ITCHING;  Start 4/8/20 at 

08:00;  Stop 4/9/20 at 07:59;  Status DC


Info (PHARMACY MONITORING -- do not chart) 1 each PRN DAILY  PRN MC SEE 

COMMENTS;  Start 4/8/20 at 08:00;  Stop 4/9/20 at 08:16;  Status DC


Albumin Human 50 ml @ 50 mls/hr 1X  ONCE IV ;  Start 4/8/20 at 08:53;  Stop 

4/8/20 at 08:56;  Status DC


Albumin Human 200 ml @  50 mls/hr PRN 1X  PRN IV HYPOTENSION Last administered 

on 4/14/20at 11:54;  Start 4/8/20 at 09:00;  Stop 5/21/20 at 11:14;  Status DC


Meropenem 500 mg/ Sodium Chloride 50 ml @  100 mls/hr Q12H IV  Last administered

on 4/28/20at 10:45;  Start 4/8/20 at 10:00;  Stop 4/28/20 at 12:37;  Status DC


Sodium Chloride 90 meq/Magnesium Sulfate 12 meq/ Calcium Gluconate 15 meq/ 

Multivitamins 10 ml/Chromium/ Copper/Manganese/ Seleni/Zn 0.5 ml/ Insulin Human 

Regular 25 unit/ Total Parenteral Nutrition/Amino Acids/Dextrose/ Fat Emulsion 

Intravenous 1,400 ml @  58.333 mls/ hr TPN  CONT IV  Last administered on 

4/8/20at 21:41;  Start 4/8/20 at 22:00;  Stop 4/9/20 at 21:59;  Status DC


Sodium Chloride 1,000 ml @  1,000 mls/hr Q1H PRN IV hypotension;  Start 4/9/20 

at 07:58;  Stop 4/9/20 at 13:57;  Status DC


Albumin Human 200 ml @  200 mls/hr 1X PRN  PRN IV Hypotension Last administered 

on 4/9/20at 09:30;  Start 4/9/20 at 08:00;  Stop 4/9/20 at 13:59;  Status DC


Sodium Chloride 1,000 ml @  400 mls/hr Q2H30M PRN IV PATENCY;  Start 4/9/20 at 

07:58;  Stop 4/9/20 at 19:57;  Status DC


Info (PHARMACY MONITORING -- do not chart) 1 each PRN DAILY  PRN MC SEE 

COMMENTS;  Start 4/9/20 at 08:00;  Status Cancel


Info (PHARMACY MONITORING -- do not chart) 1 each PRN DAILY  PRN MC SEE 

COMMENTS;  Start 4/9/20 at 08:15;  Status UNV


Sodium Chloride 90 meq/Potassium Phosphate 5 mmol/ Magnesium Sulfate 12 

meq/Calcium Gluconate 15 meq/ Multivitamins 10 ml/Chromium/ Copper/Manganese/ 

Seleni/Zn 0.5 ml/ Insulin Human Regular 30 unit/ Total Parenteral 

Nutrition/Amino Acids/Dextrose/ Fat Emulsion Intravenous 1,400 ml @  58.333 mls/

hr TPN  CONT IV  Last administered on 4/9/20at 22:08;  Start 4/9/20 at 22:00;  

Stop 4/10/20 at 21:59;  Status DC


Linezolid/Dextrose 300 ml @  300 mls/hr Q12HR IV  Last administered on 4/20/20at

20:40;  Start 4/10/20 at 11:00;  Stop 4/21/20 at 08:10;  Status DC


Sodium Chloride 90 meq/Potassium Phosphate 15 mmol/ Magnesium Sulfate 12 

meq/Calcium Gluconate 15 meq/ Multivitamins 10 ml/Chromium/ Copper/Manganese/ 

Seleni/Zn 0.5 ml/ Insulin Human Regular 30 unit/ Total Parenteral 

Nutrition/Amino Acids/Dextrose/ Fat Emulsion Intravenous 1,400 ml @  58.333 mls/

hr TPN  CONT IV  Last administered on 4/10/20at 21:49;  Start 4/10/20 at 22:00; 

Stop 4/11/20 at 21:59;  Status DC


Sodium Chloride 90 meq/Potassium Phosphate 15 mmol/ Magnesium Sulfate 12 

meq/Calcium Gluconate 15 meq/ Multivitamins 10 ml/Chromium/ Copper/Manganese/ 

Seleni/Zn 0.5 ml/ Insulin Human Regular 40 unit/ Total Parenteral 

Nutrition/Amino Acids/Dextrose/ Fat Emulsion Intravenous 1,400 ml @  58.333 mls/

hr TPN  CONT IV  Last administered on 4/11/20at 21:21;  Start 4/11/20 at 22:00; 

Stop 4/12/20 at 21:59;  Status DC


Sodium Chloride 1,000 ml @  1,000 mls/hr Q1H PRN IV hypotension;  Start 4/11/20 

at 13:26;  Stop 4/11/20 at 19:25;  Status DC


Albumin Human 200 ml @  200 mls/hr 1X PRN  PRN IV Hypotension Last administered 

on 4/11/20at 15:00;  Start 4/11/20 at 13:30;  Stop 4/11/20 at 19:29;  Status DC


Sodium Chloride (Normal Saline Flush) 10 ml 1X PRN  PRN IV AP catheter pack;  

Start 4/11/20 at 13:30;  Stop 4/12/20 at 13:29;  Status DC


Sodium Chloride (Normal Saline Flush) 10 ml 1X PRN  PRN IV  catheter pack;  

Start 4/11/20 at 13:30;  Stop 4/12/20 at 13:29;  Status DC


Sodium Chloride 1,000 ml @  400 mls/hr Q2H30M PRN IV PATENCY;  Start 4/11/20 at 

13:26;  Stop 4/12/20 at 01:25;  Status DC


Info (PHARMACY MONITORING -- do not chart) 1 each PRN DAILY  PRN MC SEE 

COMMENTS;  Start 4/11/20 at 13:30;  Stop 4/11/20 at 13:33;  Status DC


Info (PHARMACY MONITORING -- do not chart) 1 each PRN DAILY  PRN MC SEE 

COMMENTS;  Start 4/11/20 at 13:30;  Stop 4/11/20 at 13:34;  Status DC


Sodium Chloride 90 meq/Potassium Phosphate 19 mmol/ Magnesium Sulfate 12 

meq/Calcium Gluconate 15 meq/ Multivitamins 10 ml/Chromium/ Copper/Manganese/ 

Seleni/Zn 0.5 ml/ Insulin Human Regular 40 unit/ Total Parenteral 

Nutrition/Amino Acids/Dextrose/ Fat Emulsion Intravenous 1,400 ml @  58.333 mls/

hr TPN  CONT IV  Last administered on 4/12/20at 21:54;  Start 4/12/20 at 22:00; 

Stop 4/13/20 at 21:59;  Status DC


Sodium Chloride 1,000 ml @  1,000 mls/hr Q1H PRN IV hypotension;  Start 4/13/20 

at 09:35;  Stop 4/13/20 at 15:34;  Status DC


Albumin Human 200 ml @  200 mls/hr 1X PRN  PRN IV Hypotension;  Start 4/13/20 at

09:45;  Stop 4/13/20 at 15:44;  Status DC


Diphenhydramine HCl (Benadryl) 25 mg 1X PRN  PRN IV ITCHING;  Start 4/13/20 at 

09:45;  Stop 4/14/20 at 09:44;  Status DC


Diphenhydramine HCl (Benadryl) 25 mg 1X PRN  PRN IV ITCHING;  Start 4/13/20 at 

09:45;  Stop 4/14/20 at 09:44;  Status DC


Sodium Chloride 1,000 ml @  400 mls/hr Q2H30M PRN IV PATENCY;  Start 4/13/20 at 

09:35;  Stop 4/13/20 at 21:34;  Status DC


Info (PHARMACY MONITORING -- do not chart) 1 each PRN DAILY  PRN MC SEE 

COMMENTS;  Start 4/13/20 at 09:45;  Status Cancel


Sodium Chloride 100 meq/Potassium Phosphate 19 mmol/ Magnesium Sulfate 12 

meq/Calcium Gluconate 15 meq/ Multivitamins 10 ml/Chromium/ Copper/Manganese/ 

Seleni/Zn 0.5 ml/ Insulin Human Regular 40 unit/ Potassium Chloride 20 meq/ 

Total Parenteral Nutrition/Amino Acids/Dextrose/ Fat Emulsion Intravenous 1,400 

ml @  58.333 mls/ hr TPN  CONT IV  Last administered on 4/13/20at 22:02;  Start 

4/13/20 at 22:00;  Stop 4/14/20 at 21:59;  Status DC


Furosemide (Lasix) 40 mg 1X  ONCE IVP  Last administered on 4/13/20at 14:39;  

Start 4/13/20 at 14:30;  Stop 4/13/20 at 14:31;  Status DC


Metronidazole 100 ml @  100 mls/hr Q8HRS IV  Last administered on 4/21/20at 

06:04;  Start 4/14/20 at 10:00;  Stop 4/21/20 at 08:10;  Status DC


Sodium Chloride 1,000 ml @  1,000 mls/hr Q1H PRN IV hypotension;  Start 4/14/20 

at 08:00;  Stop 4/14/20 at 13:59;  Status DC


Albumin Human 200 ml @  200 mls/hr 1X PRN  PRN IV Hypotension;  Start 4/14/20 at

08:00;  Stop 4/14/20 at 13:59;  Status DC


Sodium Chloride 1,000 ml @  400 mls/hr Q2H30M PRN IV PATENCY;  Start 4/14/20 at 

08:00;  Stop 4/14/20 at 19:59;  Status DC


Info (PHARMACY MONITORING -- do not chart) 1 each PRN DAILY  PRN MC SEE 

COMMENTS;  Start 4/14/20 at 11:30;  Status UNV


Info (PHARMACY MONITORING -- do not chart) 1 each PRN DAILY  PRN MC SEE 

COMMENTS;  Start 4/14/20 at 11:30;  Stop 4/16/20 at 12:13;  Status DC


Sodium Chloride 100 meq/Potassium Phosphate 19 mmol/ Magnesium Sulfate 12 

meq/Calcium Gluconate 15 meq/ Multivitamins 10 ml/Chromium/ Copper/Manganese/ 

Seleni/Zn 0.5 ml/ Insulin Human Regular 40 unit/ Potassium Chloride 20 meq/ 

Total Parenteral Nutrition/Amino Acids/Dextrose/ Fat Emulsion Intravenous 1,400 

ml @  58.333 mls/ hr TPN  CONT IV  Last administered on 4/14/20at 21:52;  Start 

4/14/20 at 22:00;  Stop 4/15/20 at 21:59;  Status DC


Sodium Chloride (Normal Saline Flush) 10 ml QSHIFT  PRN IV AFTER MEDS AND BLOOD 

DRAWS;  Start 4/14/20 at 15:00;  Stop 5/12/20 at 11:27;  Status DC


Sodium Chloride (Normal Saline Flush) 10 ml PRN Q5MIN  PRN IV AFTER MEDS AND 

BLOOD DRAWS;  Start 4/14/20 at 15:00


Sodium Chloride (Normal Saline Flush) 20 ml PRN Q5MIN  PRN IV AFTER MEDS AND 

BLOOD DRAWS;  Start 4/14/20 at 15:00


Sodium Chloride 100 meq/Potassium Phosphate 19 mmol/ Magnesium Sulfate 12 meq/Ca

lcium Gluconate 15 meq/ Multivitamins 10 ml/Chromium/ Copper/Manganese/ 

Seleni/Zn 0.5 ml/ Insulin Human Regular 40 unit/ Potassium Chloride 20 meq/ 

Total Parenteral Nutrition/Amino Acids/Dextrose/ Fat Emulsion Intravenous 1,400 

ml @  58.333 mls/ hr TPN  CONT IV  Last administered on 4/15/20at 21:20;  Start 

4/15/20 at 22:00;  Stop 4/16/20 at 21:59;  Status DC


Lidocaine HCl (Buffered Lidocaine 1%) 3 ml STK-MED ONCE .ROUTE ;  Start 4/15/20 

at 13:16;  Stop 4/15/20 at 13:16;  Status DC


Lidocaine HCl (Buffered Lidocaine 1%) 6 ml 1X  ONCE INJ  Last administered on 

4/15/20at 13:45;  Start 4/15/20 at 13:30;  Stop 4/15/20 at 13:31;  Status DC


Albumin Human 100 ml @  100 mls/hr 1X  ONCE IV  Last administered on 4/15/20at 

15:41;  Start 4/15/20 at 15:00;  Stop 4/15/20 at 15:59;  Status DC


Albumin Human 50 ml @ 50 mls/hr 1X  ONCE IV  Last administered on 4/15/20at 

15:00;  Start 4/15/20 at 15:00;  Stop 4/15/20 at 15:59;  Status DC


Info (PHARMACY MONITORING -- do not chart) 1 each PRN DAILY  PRN MC SEE 

COMMENTS;  Start 4/16/20 at 11:30;  Status Cancel


Info (PHARMACY MONITORING -- do not chart) 1 each PRN DAILY  PRN MC SEE 

COMMENTS;  Start 4/16/20 at 11:30;  Status UNV


Sodium Chloride 100 meq/Potassium Phosphate 10 mmol/ Magnesium Sulfate 12 

meq/Calcium Gluconate 15 meq/ Multivitamins 10 ml/Chromium/ Copper/Manganese/ 

Seleni/Zn 0.5 ml/ Insulin Human Regular 35 unit/ Potassium Chloride 20 meq/ 

Total Parenteral Nutrition/Amino Acids/Dextrose/ Fat Emulsion Intravenous 1,400 

ml @  58.333 mls/ hr TPN  CONT IV  Last administered on 4/16/20at 22:10;  Start 

4/16/20 at 22:00;  Stop 4/17/20 at 21:59;  Status DC


Sodium Chloride 100 meq/Potassium Phosphate 5 mmol/ Magnesium Sulfate 12 

meq/Calcium Gluconate 15 meq/ Multivitamins 10 ml/Chromium/ Copper/Manganese/ 

Seleni/Zn 0.5 ml/ Insulin Human Regular 35 unit/ Potassium Chloride 20 meq/ 

Total Parenteral Nutrition/Amino Acids/Dextrose/ Fat Emulsion Intravenous 1,400 

ml @  58.333 mls/ hr TPN  CONT IV  Last administered on 4/17/20at 22:59;  Start 

4/17/20 at 22:00;  Stop 4/18/20 at 21:59;  Status DC


Sodium Chloride 1,000 ml @  1,000 mls/hr Q1H PRN IV hypotension;  Start 4/18/20 

at 08:27;  Stop 4/18/20 at 14:26;  Status DC


Albumin Human 200 ml @  200 mls/hr 1X PRN  PRN IV Hypotension Last administered 

on 4/18/20at 09:18;  Start 4/18/20 at 08:30;  Stop 4/18/20 at 14:29;  Status DC


Sodium Chloride 1,000 ml @  400 mls/hr Q2H30M PRN IV PATENCY;  Start 4/18/20 at 

08:27;  Stop 4/18/20 at 20:26;  Status DC


Info (PHARMACY MONITORING -- do not chart) 1 each PRN DAILY  PRN MC SEE 

COMMENTS;  Start 4/18/20 at 08:30;  Status Cancel


Info (PHARMACY MONITORING -- do not chart) 1 each PRN DAILY  PRN MC SEE 

COMMENTS;  Start 4/18/20 at 08:30;  Stop 4/26/20 at 13:10;  Status DC


Sodium Chloride 100 meq/Potassium Chloride 40 meq/ Magnesium Sulfate 15 

meq/Calcium Gluconate 15 meq/ Multivitamins 10 ml/Chromium/ Copper/Manganese/ 

Seleni/Zn 0.5 ml/ Insulin Human Regular 35 unit/ Total Parenteral Nutri

tion/Amino Acids/Dextrose/ Fat Emulsion Intravenous 1,400 ml @  58.333 mls/ hr 

TPN  CONT IV  Last administered on 4/18/20at 22:00;  Start 4/18/20 at 22:00;  

Stop 4/19/20 at 21:59;  Status DC


Potassium Chloride/Water 100 ml @  100 mls/hr 1X  ONCE IV  Last administered on 

4/18/20at 17:28;  Start 4/18/20 at 14:45;  Stop 4/18/20 at 15:44;  Status DC


Sodium Chloride 100 meq/Potassium Chloride 40 meq/ Magnesium Sulfate 15 

meq/Calcium Gluconate 15 meq/ Multivitamins 10 ml/Chromium/ Copper/Manganese/ 

Seleni/Zn 0.5 ml/ Insulin Human Regular 35 unit/ Total Parenteral 

Nutrition/Amino Acids/Dextrose/ Fat Emulsion Intravenous 1,400 ml @  58.333 mls/

hr TPN  CONT IV  Last administered on 4/19/20at 22:46;  Start 4/19/20 at 22:00; 

Stop 4/20/20 at 21:59;  Status DC


Sodium Chloride 100 meq/Potassium Chloride 40 meq/ Magnesium Sulfate 20 

meq/Calcium Gluconate 15 meq/ Multivitamins 10 ml/Chromium/ Copper/Manganese/ 

Seleni/Zn 0.5 ml/ Insulin Human Regular 35 unit/ Total Parenteral 

Nutrition/Amino Acids/Dextrose/ Fat Emulsion Intravenous 1,400 ml @  58.333 mls/

hr TPN  CONT IV  Last administered on 4/20/20at 22:31;  Start 4/20/20 at 22:00; 

Stop 4/21/20 at 21:59;  Status DC


Fentanyl Citrate (Fentanyl 2ml Vial) 50 mcg PRN Q2HR  PRN IVP PAIN Last 

administered on 4/27/20at 13:32;  Start 4/20/20 at 21:00;  Stop 4/28/20 at 

12:53;  Status DC


Fentanyl Citrate (Fentanyl 2ml Vial) 25 mcg PRN Q2HR  PRN IVP PAIN;  Start 

4/20/20 at 21:00;  Stop 4/28/20 at 12:54;  Status DC


Enoxaparin Sodium (Lovenox 100mg Syringe) 100 mg Q12HR SQ ;  Start 4/21/20 at 

21:00;  Status UNV


Amino Acids/ Glycerin/ Electrolytes 1,000 ml @  75 mls/hr [id number] IV ;  Start 

4/20/20 at 21:15;  Status UNV


Sodium Chloride 1,000 ml @  1,000 mls/hr Q1H PRN IV hypotension;  Start 4/21/20 

at 07:56;  Stop 4/21/20 at 13:55;  Status DC


Albumin Human 200 ml @  200 mls/hr 1X PRN  PRN IV Hypotension Last administered 

on 4/21/20at 08:40;  Start 4/21/20 at 08:00;  Stop 4/21/20 at 13:59;  Status DC


Sodium Chloride 1,000 ml @  400 mls/hr Q2H30M PRN IV PATENCY;  Start 4/21/20 at 

07:56;  Stop 4/21/20 at 19:55;  Status DC


Info (PHARMACY MONITORING -- do not chart) 1 each PRN DAILY  PRN MC SEE 

COMMENTS;  Start 4/21/20 at 08:00;  Status UNV


Info (PHARMACY MONITORING -- do not chart) 1 each PRN DAILY  PRN MC SEE 

COMMENTS;  Start 4/21/20 at 08:00;  Status UNV


Daptomycin 430 mg/ Sodium Chloride 50 ml @  100 mls/hr Q24H IV  Last 

administered on 4/21/20at 12:35;  Start 4/21/20 at 09:00;  Stop 4/21/20 at 

12:49;  Status DC


Sodium Chloride 100 meq/Potassium Chloride 40 meq/ Magnesium Sulfate 20 

meq/Calcium Gluconate 15 meq/ Multivitamins 10 ml/Chromium/ Copper/Manganese/ 

Seleni/Zn 0.5 ml/ Insulin Human Regular 35 unit/ Total Parenteral 

Nutrition/Amino Acids/Dextrose/ Fat Emulsion Intravenous 1,400 ml @  58.333 mls/

hr TPN  CONT IV  Last administered on 4/21/20at 21:26;  Start 4/21/20 at 22:00; 

Stop 4/22/20 at 21:59;  Status DC


Daptomycin 430 mg/ Sodium Chloride 50 ml @  100 mls/hr Q48H IV ;  Start 4/23/20 

at 09:00;  Stop 4/22/20 at 11:55;  Status DC


Sodium Chloride 100 meq/Potassium Chloride 40 meq/ Magnesium Sulfate 20 

meq/Calcium Gluconate 15 meq/ Multivitamins 10 ml/Chromium/ Copper/Manganese/ 

Seleni/Zn 0.5 ml/ Insulin Human Regular 35 unit/ Total Parenteral 

Nutrition/Amino Acids/Dextrose/ Fat Emulsion Intravenous 1,400 ml @  58.333 mls/

hr TPN  CONT IV  Last administered on 4/22/20at 22:27;  Start 4/22/20 at 22:00; 

Stop 4/23/20 at 21:59;  Status DC


Daptomycin 430 mg/ Sodium Chloride 50 ml @  100 mls/hr Q24H IV  Last 

administered on 4/24/20at 15:07;  Start 4/22/20 at 13:00;  Stop 4/25/20 at 

13:15;  Status DC


Sodium Chloride 100 meq/Potassium Chloride 40 meq/ Magnesium Sulfate 20 

meq/Calcium Gluconate 10 meq/ Multivitamins 10 ml/Chromium/ Copper/Manganese/ 

Seleni/Zn 0.5 ml/ Insulin Human Regular 35 unit/ Total Parenteral 

Nutrition/Amino Acids/Dextrose/ Fat Emulsion Intravenous 1,400 ml @  58.333 mls/

hr TPN  CONT IV  Last administered on 4/24/20at 00:06;  Start 4/23/20 at 22:00; 

Stop 4/24/20 at 21:59;  Status DC


Alteplase, Recombinant (Cathflo For Central Catheter Clearance) 1 mg 1X  ONCE 

INT CAT  Last administered on 4/24/20at 11:44;  Start 4/24/20 at 10:45;  Stop 

4/24/20 at 10:46;  Status DC


Ondansetron HCl (Zofran) 4 mg PRN Q6HRS  PRN IV NAUSEA/VOMITING;  Start 4/27/20 

at 07:00;  Stop 4/28/20 at 06:59;  Status DC


Fentanyl Citrate (Fentanyl 2ml Vial) 25 mcg PRN Q5MIN  PRN IV MILD PAIN 1-3;  

Start 4/27/20 at 07:00;  Stop 4/28/20 at 06:59;  Status DC


Fentanyl Citrate (Fentanyl 2ml Vial) 50 mcg PRN Q5MIN  PRN IV MODERATE TO SEVERE

PAIN Last administered on 4/27/20at 10:17;  Start 4/27/20 at 07:00;  Stop 4/28/2

0 at 06:59;  Status DC


Ringer's Solution 1,000 ml @  30 mls/hr Q24H IV ;  Start 4/27/20 at 07:00;  Stop

4/27/20 at 18:59;  Status DC


Lidocaine HCl (Xylocaine-Mpf 1% 2ml Vial) 2 ml PRN 1X  PRN ID PRIOR TO IV START;

 Start 4/27/20 at 07:00;  Stop 4/28/20 at 06:59;  Status DC


Prochlorperazine Edisylate (Compazine) 5 mg PACU PRN  PRN IV NAUSEA, MRX1;  

Start 4/27/20 at 07:00;  Stop 4/28/20 at 06:59;  Status DC


Sodium Acetate 50 meq/Potassium Acetate 55 meq/ Magnesium Sulfate 20 meq/Calcium

Gluconate 10 meq/ Multivitamins 10 ml/Chromium/ Copper/Manganese/ Seleni/Zn 0.5 

ml/ Insulin Human Regular 35 unit/ Total Parenteral Nutrition/Amino Ac

ids/Dextrose/ Fat Emulsion Intravenous 1,400 ml @  58.333 mls/ hr TPN  CONT IV ;

 Start 4/24/20 at 22:00;  Stop 4/24/20 at 14:15;  Status DC


Sodium Acetate 50 meq/Potassium Acetate 55 meq/ Magnesium Sulfate 20 meq/Calcium

Gluconate 10 meq/ Multivitamins 10 ml/Chromium/ Copper/Manganese/ Seleni/Zn 0.5 

ml/ Insulin Human Regular 35 unit/ Total Parenteral Nutrition/Amino 

Acids/Dextrose/ Fat Emulsion Intravenous 1,800 ml @  75 mls/hr TPN  CONT IV  

Last administered on 4/24/20at 22:38;  Start 4/24/20 at 22:00;  Stop 4/25/20 at 

21:59;  Status DC


Sodium Chloride 1,000 ml @  1,000 mls/hr Q1H PRN IV hypotension;  Start 4/24/20 

at 15:31;  Stop 4/24/20 at 21:30;  Status DC


Diphenhydramine HCl (Benadryl) 25 mg 1X PRN  PRN IV ITCHING;  Start 4/24/20 at 

15:45;  Stop 4/25/20 at 15:44;  Status DC


Diphenhydramine HCl (Benadryl) 25 mg 1X PRN  PRN IV ITCHING;  Start 4/24/20 at 

15:45;  Stop 4/25/20 at 15:44;  Status DC


Sodium Chloride 1,000 ml @  400 mls/hr Q2H30M PRN IV PATENCY;  Start 4/24/20 at 

15:31;  Stop 4/25/20 at 03:30;  Status DC


Info (PHARMACY MONITORING -- do not chart) 1 each PRN DAILY  PRN MC SEE 

COMMENTS;  Start 4/24/20 at 15:45;  Stop 5/26/20 at 14:14;  Status DC


Sodium Acetate 50 meq/Potassium Acetate 55 meq/ Magnesium Sulfate 20 meq/Calcium

Gluconate 10 meq/ Multivitamins 10 ml/Chromium/ Copper/Manganese/ Seleni/Zn 0.5 

ml/ Insulin Human Regular 35 unit/ Total Parenteral Nutrition/Amino Aci

ds/Dextrose/ Fat Emulsion Intravenous 1,800 ml @  75 mls/hr TPN  CONT IV  Last 

administered on 4/25/20at 22:03;  Start 4/25/20 at 22:00;  Stop 4/26/20 at 

21:59;  Status DC


Daptomycin 430 mg/ Sodium Chloride 50 ml @  100 mls/hr Q24H IV  Last 

administered on 4/30/20at 13:00;  Start 4/25/20 at 13:00;  Stop 4/30/20 at 

20:58;  Status DC


Heparin Sodium (Porcine) 1000 unit/Sodium Chloride 1,001 ml @  1,001 mls/hr 1X  

ONCE IRR ;  Start 4/27/20 at 06:00;  Stop 4/27/20 at 06:59;  Status DC


Potassium Acetate 55 meq/Magnesium Sulfate 20 meq/ Calcium Gluconate 10 meq/ 

Multivitamins 10 ml/Chromium/ Copper/Manganese/ Seleni/Zn 0.5 ml/ Insulin Human 

Regular 35 unit/ Total Parenteral Nutrition/Amino Acids/Dextrose/ Fat Emulsion 

Intravenous 1,920 ml @  80 mls/hr TPN  CONT IV  Last administered on 4/26/20at 

22:10;  Start 4/26/20 at 22:00;  Stop 4/27/20 at 21:59;  Status DC


Dexamethasone Sodium Phosphate (Decadron) 4 mg STK-MED ONCE .ROUTE ;  Start 

4/27/20 at 10:56;  Stop 4/27/20 at 10:57;  Status DC


Ondansetron HCl (Zofran) 4 mg STK-MED ONCE .ROUTE ;  Start 4/27/20 at 10:56;  

Stop 4/27/20 at 10:57;  Status DC


Rocuronium Bromide (Zemuron) 50 mg STK-MED ONCE .ROUTE ;  Start 4/27/20 at 

10:56;  Stop 4/27/20 at 10:57;  Status DC


Fentanyl Citrate (Fentanyl 2ml Vial) 100 mcg STK-MED ONCE .ROUTE ;  Start 

4/27/20 at 10:56;  Stop 4/27/20 at 10:57;  Status DC


Bupivacaine HCl/ Epinephrine Bitart (Sensorcain-Epi 0.5%-1:160625 Mpf) 30 ml 

STK-MED ONCE .ROUTE  Last administered on 4/27/20at 12:01;  Start 4/27/20 at 

10:58;  Stop 4/27/20 at 10:58;  Status DC


Cellulose (Surgicel Hemostat 2x14) 1 each STK-MED ONCE .ROUTE ;  Start 4/27/20 

at 10:58;  Stop 4/27/20 at 10:59;  Status DC


Iohexol (Omnipaque 300 Mg/ml) 50 ml STK-MED ONCE .ROUTE ;  Start 4/27/20 at 

10:58;  Stop 4/27/20 at 10:59;  Status DC


Cellulose (Surgicel Hemostat 4x8) 1 each STK-MED ONCE .ROUTE ;  Start 4/27/20 at

10:58;  Stop 4/27/20 at 10:59;  Status DC


Bisacodyl (Dulcolax Supp) 10 mg STK-MED ONCE .ROUTE ;  Start 4/27/20 at 10:59;  

Stop 4/27/20 at 10:59;  Status DC


Heparin Sodium (Porcine) 1000 unit/Sodium Chloride 1,001 ml @  1,001 mls/hr 1X  

ONCE IRR ;  Start 4/27/20 at 12:00;  Stop 4/27/20 at 12:59;  Status DC


Propofol 20 ml @ As Directed STK-MED ONCE IV ;  Start 4/27/20 at 11:05;  Stop 

4/27/20 at 11:05;  Status DC


Sevoflurane (Ultane) 90 ml STK-MED ONCE IH ;  Start 4/27/20 at 11:05;  Stop 

4/27/20 at 11:05;  Status DC


Sevoflurane (Ultane) 60 ml STK-MED ONCE IH ;  Start 4/27/20 at 12:26;  Stop 

4/27/20 at 12:27;  Status DC


Propofol 20 ml @ As Directed STK-MED ONCE IV ;  Start 4/27/20 at 12:26;  Stop 

4/27/20 at 12:27;  Status DC


Phenylephrine HCl (PHENYLEPHRINE in 0.9% NACL PF) 1 mg STK-MED ONCE IV ;  Start 

4/27/20 at 12:34;  Stop 4/27/20 at 12:34;  Status DC


Heparin Sodium (Porcine) (Heparin Sodium) 5,000 unit Q12HR SQ  Last administered

on 5/6/20at 20:57;  Start 4/27/20 at 21:00;  Stop 5/7/20 at 09:59;  Status DC


Sodium Chloride (Normal Saline Flush) 3 ml QSHIFT  PRN IV AFTER MEDS AND BLOOD 

DRAWS;  Start 4/27/20 at 13:45


Naloxone HCl (Narcan) 0.4 mg PRN Q2MIN  PRN IV SEE INSTRUCTIONS Last 

administered on 6/6/20at 15:15;  Start 4/27/20 at 13:45


Sodium Chloride 1,000 ml @  25 mls/hr Q24H IV  Last administered on 5/26/20at 

13:37;  Start 4/27/20 at 13:37;  Stop 5/29/20 at 13:09;  Status DC


Naloxone HCl (Narcan) 0.4 mg PRN Q2MIN  PRN IV SEE INSTRUCTIONS;  Start 4/27/20 

at 14:30;  Status UNV


Sodium Chloride 1,000 ml @  25 mls/hr Q24H IV ;  Start 4/27/20 at 14:30;  Status

UNV


Hydromorphone HCl 30 ml @ 0 mls/hr CONT PRN  PRN IV PER PROTOCOL Last 

administered on 5/2/20at 16:08;  Start 4/27/20 at 14:30;  Stop 5/4/20 at 08:55; 

Status DC


Potassium Acetate 55 meq/Magnesium Sulfate 20 meq/ Calcium Gluconate 10 meq/ 

Multivitamins 10 ml/Chromium/ Copper/Manganese/ Seleni/Zn 0.5 ml/ Insulin Human 

Regular 35 unit/ Total Parenteral Nutrition/Amino Acids/Dextrose/ Fat Emulsion 

Intravenous 1,920 ml @  80 mls/hr TPN  CONT IV  Last administered on 4/27/20at 

22:01;  Start 4/27/20 at 22:00;  Stop 4/28/20 at 21:59;  Status DC


Bumetanide (Bumex) 2 mg [id number] IV  Last administered on 5/1/20at 13:50;  Start 

4/28/20 at 14:00;  Stop 5/2/20 at 14:10;  Status DC


Meropenem 1 gm/ Sodium Chloride 100 ml @  200 mls/hr Q8HRS IV  Last administered

on 5/22/20at 05:53;  Start 4/28/20 at 14:00;  Stop 5/22/20 at 09:31;  Status DC


Potassium Acetate 55 meq/Magnesium Sulfate 20 meq/ Calcium Gluconate 10 meq/ 

Multivitamins 10 ml/Chromium/ Copper/Manganese/ Seleni/Zn 0.5 ml/ Insulin Human 

Regular 35 unit/ Total Parenteral Nutrition/Amino Acids/Dextrose/ Fat Emulsion 

Intravenous 1,920 ml @  80 mls/hr TPN  CONT IV  Last administered on 4/28/20at 

22:02;  Start 4/28/20 at 22:00;  Stop 4/29/20 at 21:59;  Status DC


Hydromorphone HCl (Dilaudid Standard PCA) 12 mg STK-MED ONCE IV ;  Start 4/27/20

at 14:35;  Stop 4/28/20 at 13:53;  Status DC


Artificial Tears (Artificial Tears) 1 drop PRN Q15MIN  PRN OU DRY EYE Last 

administered on 6/15/20at 03:38;  Start 4/29/20 at 05:30


Hydromorphone HCl (Dilaudid Standard PCA) 12 mg STK-MED ONCE IV ;  Start 4/28/20

at 12:05;  Stop 4/29/20 at 09:15;  Status DC


Potassium Acetate 65 meq/Magnesium Sulfate 20 meq/ Calcium Gluconate 10 meq/ 

Multivitamins 10 ml/Chromium/ Copper/Manganese/ Seleni/Zn 0.5 ml/ Insulin Human 

Regular 30 unit/ Total Parenteral Nutrition/Amino Acids/Dextrose/ Fat Emulsion 

Intravenous 1,920 ml @  80 mls/hr TPN  CONT IV  Last administered on 4/29/20at 

22:22;  Start 4/29/20 at 22:00;  Stop 4/30/20 at 21:59;  Status DC


Cyclobenzaprine HCl (Flexeril) 10 mg PRN Q6HRS  PRN PO MUSCLE SPASMS;  Start 

4/30/20 at 10:45


Potassium Acetate 55 meq/Magnesium Sulfate 20 meq/ Calcium Gluconate 10 meq/ 

Multivitamins 10 ml/Chromium/ Copper/Manganese/ Seleni/Zn 0.5 ml/ Insulin Human 

Regular 30 unit/ Total Parenteral Nutrition/Amino Acids/Dextrose/ Fat Emulsion 

Intravenous 1,920 ml @  80 mls/hr TPN  CONT IV  Last administered on 5/1/20at 

01:00;  Start 4/30/20 at 22:00;  Stop 5/1/20 at 21:59;  Status DC


Magnesium Sulfate 50 ml @ 25 mls/hr 1X  ONCE IV  Last administered on 4/30/20at 

17:18;  Start 4/30/20 at 12:45;  Stop 4/30/20 at 14:44;  Status DC


Potassium Chloride/Water 100 ml @  100 mls/hr 1X  ONCE IV  Last administered on 

5/1/20at 11:27;  Start 5/1/20 at 12:00;  Stop 5/1/20 at 12:59;  Status DC


Hydromorphone HCl (Dilaudid Standard PCA) 12 mg STK-MED ONCE IV ;  Start 4/29/20

at 10:50;  Stop 5/1/20 at 11:02;  Status DC


Hydromorphone HCl (Dilaudid Standard PCA) 12 mg STK-MED ONCE IV ;  Start 4/30/20

at 13:47;  Stop 5/1/20 at 11:03;  Status DC


Potassium Acetate 30 meq/Magnesium Sulfate 20 meq/ Calcium Gluconate 10 meq/ 

Multivitamins 10 ml/Chromium/ Copper/Manganese/ Seleni/Zn 0.5 ml/ Insulin Human 

Regular 30 unit/ Potassium Chloride 30 meq/ Total Parenteral Nutrition/Amino 

Acids/Dextrose/ Fat Emulsion Intravenous 1,920 ml @  80 mls/hr TPN  CONT IV  

Last administered on 5/1/20at 22:34;  Start 5/1/20 at 22:00;  Stop 5/2/20 at 

21:59;  Status DC


Potassium Chloride/Water 100 ml @  100 mls/hr Q1H IV  Last administered on 

5/2/20at 13:05;  Start 5/2/20 at 07:00;  Stop 5/2/20 at 10:59;  Status DC


Magnesium Sulfate 50 ml @ 25 mls/hr 1X  ONCE IV  Last administered on 5/2/20at 

10:34;  Start 5/2/20 at 10:30;  Stop 5/2/20 at 12:29;  Status DC


Potassium Chloride 75 meq/ Magnesium Sulfate 20 meq/Calcium Gluconate 10 meq/ 

Multivitamins 10 ml/Chromium/ Copper/Manganese/ Seleni/Zn 0.5 ml/ Insulin Human 

Regular 30 unit/ Total Parenteral Nutrition/Amino Acids/Dextrose/ Fat Emulsion 

Intravenous 1,920 ml @  80 mls/hr TPN  CONT IV  Last administered on 5/2/20at 

21:51;  Start 5/2/20 at 22:00;  Stop 5/3/20 at 22:00;  Status DC


Potassium Chloride 75 meq/ Magnesium Sulfate 20 meq/Calcium Gluconate 10 meq/ 

Multivitamins 10 ml/Chromium/ Copper/Manganese/ Seleni/Zn 0.5 ml/ Insulin Human 

Regular 25 unit/ Total Parenteral Nutrition/Amino Acids/Dextrose/ Fat Emulsion 

Intravenous 1,920 ml @  80 mls/hr TPN  CONT IV  Last administered on 5/3/20at 

22:04;  Start 5/3/20 at 22:00;  Stop 5/4/20 at 21:59;  Status DC


Hydromorphone HCl (Dilaudid) 0.4 mg PRN Q4HRS  PRN IVP PAIN Last administered on

5/4/20at 10:57;  Start 5/4/20 at 09:00;  Stop 5/4/20 at 18:59;  Status DC


Micafungin Sodium 100 mg/Dextrose 100 ml @  100 mls/hr Q24H IV  Last 

administered on 5/26/20at 12:17;  Start 5/4/20 at 11:00;  Stop 5/27/20 at 09:59;

 Status DC


Daptomycin 485 mg/ Sodium Chloride 50 ml @  100 mls/hr Q24H IV  Last administ

ered on 5/11/20at 13:10;  Start 5/4/20 at 11:00;  Stop 5/12/20 at 07:44;  Status

DC


Potassium Chloride 75 meq/ Magnesium Sulfate 15 meq/Calcium Gluconate 8 meq/ 

Multivitamins 10 ml/Chromium/ Copper/Manganese/ Seleni/Zn 0.5 ml/ Insulin Human 

Regular 25 unit/ Total Parenteral Nutrition/Amino Acids/Dextrose/ Fat Emulsion 

Intravenous 1,920 ml @  80 mls/hr TPN  CONT IV  Last administered on 5/4/20at 

23:08;  Start 5/4/20 at 22:00;  Stop 5/5/20 at 21:59;  Status DC


Haloperidol Lactate (Haldol Inj) 3 mg 1X  ONCE IVP  Last administered on 

5/4/20at 14:37;  Start 5/4/20 at 14:30;  Stop 5/4/20 at 14:31;  Status DC


Hydromorphone HCl (Dilaudid) 1 mg PRN Q4HRS  PRN IVP PAIN Last administered on 

5/18/20at 06:25;  Start 5/4/20 at 19:00;  Stop 5/18/20 at 17:10;  Status DC


Potassium Chloride 75 meq/ Magnesium Sulfate 15 meq/Calcium Gluconate 8 meq/ 

Multivitamins 10 ml/Chromium/ Copper/Manganese/ Seleni/Zn 0.5 ml/ Insulin Human 

Regular 20 unit/ Total Parenteral Nutrition/Amino Acids/Dextrose/ Fat Emulsion 

Intravenous 1,920 ml @  80 mls/hr TPN  CONT IV  Last administered on 5/5/20at 

22:10;  Start 5/5/20 at 22:00;  Stop 5/6/20 at 21:59;  Status DC


Lidocaine HCl (Buffered Lidocaine 1%) 3 ml STK-MED ONCE .ROUTE ;  Start 5/6/20 

at 11:31;  Stop 5/6/20 at 11:31;  Status DC


Lidocaine HCl (Buffered Lidocaine 1%) 3 ml STK-MED ONCE .ROUTE ;  Start 5/6/20 

at 12:28;  Stop 5/6/20 at 12:29;  Status DC


Lidocaine HCl (Buffered Lidocaine 1%) 6 ml 1X  ONCE INJ  Last administered on 

5/6/20at 12:53;  Start 5/6/20 at 12:45;  Stop 5/6/20 at 12:46;  Status DC


Potassium Chloride 75 meq/ Magnesium Sulfate 15 meq/Calcium Gluconate 8 meq/ 

Multivitamins 10 ml/Chromium/ Copper/Manganese/ Seleni/Zn 0.5 ml/ Insulin Human 

Regular 20 unit/ Total Parenteral Nutrition/Amino Acids/Dextrose/ Fat Emulsion 

Intravenous 1,920 ml @  80 mls/hr TPN  CONT IV  Last administered on 5/6/20at 

22:00;  Start 5/6/20 at 22:00;  Stop 5/7/20 at 21:59;  Status DC


Potassium Chloride 75 meq/ Magnesium Sulfate 15 meq/Calcium Gluconate 8 meq/ 

Multivitamins 10 ml/Chromium/ Copper/Manganese/ Seleni/Zn 0.5 ml/ Insulin Human 

Regular 15 unit/ Total Parenteral Nutrition/Amino Acids/Dextrose/ Fat Emulsion 

Intravenous 1,920 ml @  80 mls/hr TPN  CONT IV  Last administered on 5/7/20at 

22:28;  Start 5/7/20 at 22:00;  Stop 5/8/20 at 21:59;  Status DC


Vecuronium Bromide (Norcuron Bolus) 6 mg PRN Q6HRS  PRN IV VENT ASYNCHRONY;  

Start 5/7/20 at 19:15;  Stop 5/7/20 at 19:35;  Status DC


Bumetanide (Bumex) 2 mg 1X  ONCE IV  Last administered on 5/7/20at 22:09;  Start

5/7/20 at 19:45;  Stop 5/7/20 at 19:46;  Status DC


Lidocaine HCl (Buffered Lidocaine 1%) 3 ml STK-MED ONCE .ROUTE ;  Start 5/8/20 

at 07:59;  Stop 5/8/20 at 07:59;  Status DC


Midazolam HCl (Versed) 5 mg STK-MED ONCE .ROUTE ;  Start 5/8/20 at 08:36;  Stop 

5/8/20 at 08:36;  Status DC


Fentanyl Citrate (Fentanyl 5ml Vial) 250 mcg STK-MED ONCE .ROUTE ;  Start 5/8/20

at 08:36;  Stop 5/8/20 at 08:37;  Status DC


Lidocaine HCl (Buffered Lidocaine 1%) 3 ml 1X  ONCE IJ  Last administered on 

5/8/20at 09:30;  Start 5/8/20 at 09:15;  Stop 5/8/20 at 09:16;  Status DC


Midazolam HCl (Versed) 5 mg 1X  ONCE IV  Last administered on 5/8/20at 09:30;  

Start 5/8/20 at 09:15;  Stop 5/8/20 at 09:16;  Status DC


Fentanyl Citrate (Fentanyl 5ml Vial) 250 mcg 1X  ONCE IV  Last administered on 

5/8/20at 09:30;  Start 5/8/20 at 09:15;  Stop 5/8/20 at 09:16;  Status DC


Bumetanide (Bumex) 2 mg DAILY IV  Last administered on 5/18/20at 08:07;  Start 

5/8/20 at 10:00;  Stop 5/18/20 at 17:15;  Status DC


Potassium Chloride 75 meq/ Magnesium Sulfate 15 meq/ Multivitamins 10 

ml/Chromium/ Copper/Manganese/ Seleni/Zn 0.5 ml/ Insulin Human Regular 15 unit/ 

Total Parenteral Nutrition/Amino Acids/Dextrose/ Fat Emulsion Intravenous 1,920 

ml @  80 mls/hr TPN  CONT IV  Last administered on 5/8/20at 21:59;  Start 5/8/20

at 22:00;  Stop 5/9/20 at 21:59;  Status DC


Metoclopramide HCl (Reglan Vial) 10 mg PRN Q3HRS  PRN IVP NAUSEA/VOMITING-3rd 

choice Last administered on 5/14/20at 04:25;  Start 5/9/20 at 16:45


Potassium Chloride 75 meq/ Magnesium Sulfate 15 meq/ Multivitamins 10 

ml/Chromium/ Copper/Manganese/ Seleni/Zn 0.5 ml/ Insulin Human Regular 15 unit/ 

Total Parenteral Nutrition/Amino Acids/Dextrose/ Fat Emulsion Intravenous 1,920 

ml @  80 mls/hr TPN  CONT IV  Last administered on 5/9/20at 22:41;  Start 5/9/20

at 22:00;  Stop 5/10/20 at 21:59;  Status DC


Magnesium Sulfate 50 ml @ 25 mls/hr 1X  ONCE IV  Last administered on 5/10/20at 

10:44;  Start 5/10/20 at 09:00;  Stop 5/10/20 at 10:59;  Status DC


Potassium Chloride/Water 100 ml @  100 mls/hr 1X  ONCE IV  Last administered on 

5/10/20at 09:37;  Start 5/10/20 at 09:00;  Stop 5/10/20 at 09:59;  Status DC


Duloxetine HCl (Cymbalta) 30 mg DAILY PO  Last administered on 5/11/20at 09:48; 

Start 5/10/20 at 14:00;  Stop 5/13/20 at 10:25;  Status DC


Potassium Chloride 80 meq/ Magnesium Sulfate 20 meq/ Multivitamins 10 

ml/Chromium/ Copper/Manganese/ Seleni/Zn 0.5 ml/ Insulin Human Regular 15 unit/ 

Total Parenteral Nutrition/Amino Acids/Dextrose/ Fat Emulsion Intravenous 1,920 

ml @  80 mls/hr TPN  CONT IV  Last administered on 5/10/20at 21:42;  Start 

5/10/20 at 22:00;  Stop 5/11/20 at 21:59;  Status DC


Potassium Chloride 80 meq/ Magnesium Sulfate 20 meq/ Multivitamins 10 

ml/Chromium/ Copper/Manganese/ Seleni/Zn 0.5 ml/ Insulin Human Regular 15 unit/ 

Total Parenteral Nutrition/Amino Acids/Dextrose/ Fat Emulsion Intravenous 1,920 

ml @  80 mls/hr TPN  CONT IV  Last administered on 5/11/20at 22:20;  Start 

5/11/20 at 22:00;  Stop 5/12/20 at 21:59;  Status DC


Lidocaine HCl (Buffered Lidocaine 1%) 3 ml [place]-MED ONCE .ROUTE ;  Start 5/12/20 

at 09:54;  Stop 5/12/20 at 09:55;  Status DC


Hydromorphone HCl (Dilaudid Standard PCA) 12 mg STK-MED ONCE IV ;  Start 5/1/20 

at 15:50;  Stop 5/12/20 at 11:24;  Status DC


Potassium Chloride 80 meq/ Magnesium Sulfate 20 meq/ Multivitamins 10 

ml/Chromium/ Copper/Manganese/ Seleni/Zn 0.5 ml/ Insulin Human Regular 15 unit/ 

Total Parenteral Nutrition/Amino Acids/Dextrose/ Fat Emulsion Intravenous 1,920 

ml @  80 mls/hr TPN  CONT IV  Last administered on 5/12/20at 21:40;  Start 

5/12/20 at 22:00;  Stop 5/13/20 at 21:59;  Status DC


Lidocaine HCl (Buffered Lidocaine 1%) 6 ml 1X  ONCE INJ  Last administered on 

5/12/20at 14:15;  Start 5/12/20 at 14:15;  Stop 5/12/20 at 14:16;  Status DC


Potassium Chloride 80 meq/ Magnesium Sulfate 20 meq/ Multivitamins 10 

ml/Chromium/ Copper/Manganese/ Seleni/Zn 1 ml/ Insulin Human Regular 15 unit/ 

Total Parenteral Nutrition/Amino Acids/Dextrose/ Fat Emulsion Intravenous 1,920 

ml @  80 mls/hr TPN  CONT IV  Last administered on 5/13/20at 22:04;  Start 

5/13/20 at 22:00;  Stop 5/14/20 at 21:59;  Status DC


Potassium Chloride/Water 100 ml @  100 mls/hr 1X  ONCE IV  Last administered on 

5/14/20at 11:34;  Start 5/14/20 at 11:00;  Stop 5/14/20 at 11:59;  Status DC


Potassium Chloride 90 meq/ Magnesium Sulfate 20 meq/ Multivitamins 10 

ml/Chromium/ Copper/Manganese/ Seleni/Zn 1 ml/ Insulin Human Regular 15 unit/ 

Total Parenteral Nutrition/Amino Acids/Dextrose/ Fat Emulsion Intravenous 1,920 

ml @  80 mls/hr TPN  CONT IV  Last administered on 5/14/20at 22:57;  Start 

5/14/20 at 22:00;  Stop 5/15/20 at 21:59;  Status DC


Potassium Chloride 90 meq/ Magnesium Sulfate 20 meq/ Multivitamins 10 

ml/Chromium/ Copper/Manganese/ Seleni/Zn 1 ml/ Insulin Human Regular 15 unit/ 

Total Parenteral Nutrition/Amino Acids/Dextrose/ Fat Emulsion Intravenous 1,920 

ml @  80 mls/hr TPN  CONT IV  Last administered on 5/15/20at 22:48;  Start 

5/15/20 at 22:00;  Stop 5/16/20 at 21:59;  Status DC


Potassium Chloride 90 meq/ Magnesium Sulfate 20 meq/ Multivitamins 10 

ml/Chromium/ Copper/Manganese/ Seleni/Zn 1 ml/ Insulin Human Regular 15 unit/ 

Total Parenteral Nutrition/Amino Acids/Dextrose/ Fat Emulsion Intravenous 1,890 

ml @  78.75 mls/ hr TPN  CONT IV  Last administered on 5/16/20at 22:15;  Start 

5/16/20 at 22:00;  Stop 5/17/20 at 21:59;  Status DC


Linezolid/Dextrose 300 ml @  300 mls/hr Q12HR IV  Last administered on 5/19/20at

21:08;  Start 5/17/20 at 09:00;  Stop 5/20/20 at 08:11;  Status DC


Daptomycin 450 mg/ Sodium Chloride 50 ml @  100 mls/hr Q24H IV  Last 

administered on 5/20/20at 09:25;  Start 5/17/20 at 09:00;  Stop 5/21/20 at 

08:30;  Status DC


Potassium Chloride 90 meq/ Magnesium Sulfate 20 meq/ Multivitamins 10 

ml/Chromium/ Copper/Manganese/ Seleni/Zn 1 ml/ Insulin Human Regular 15 unit/ 

Total Parenteral Nutrition/Amino Acids/Dextrose/ Fat Emulsion Intravenous 1,890 

ml @  78.75 mls/ hr TPN  CONT IV  Last administered on 5/17/20at 21:34;  Start 

5/17/20 at 22:00;  Stop 5/18/20 at 21:59;  Status DC


Lorazepam (Ativan Inj) 2 mg STK-MED ONCE .ROUTE ;  Start 5/17/20 at 14:58;  Stop

5/17/20 at 14:58;  Status DC


Metoprolol Tartrate (Lopressor Vial) 5 mg 1X  ONCE IVP  Last administered on 

5/17/20at 15:31;  Start 5/17/20 at 15:15;  Stop 5/17/20 at 15:16;  Status DC


Lorazepam (Ativan Inj) 2 mg 1X  ONCE IVP  Last administered on 5/17/20at 15:30; 

Start 5/17/20 at 15:15;  Stop 5/17/20 at 15:16;  Status DC


Enoxaparin Sodium (Lovenox 40mg Syringe) 40 mg Q24H SQ  Last administered on 

6/5/20at 17:44;  Start 5/17/20 at 17:00;  Stop 6/7/20 at 06:50;  Status DC


Lorazepam (Ativan Inj) 1 mg PRN Q4HRS  PRN IVP ANXIETY / AGITATION MILD-MOD Last

administered on 5/31/20at 15:55;  Start 5/17/20 at 19:15;  Stop 6/2/20 at 11:45;

 Status DC


Lorazepam (Ativan Inj) 2 mg PRN Q4HRS  PRN IVP ANXIETY / AGITATION SEVERE Last 

administered on 6/1/20at 07:55;  Start 5/17/20 at 19:15;  Stop 6/2/20 at 11:45; 

Status DC


Fentanyl Citrate (Fentanyl 2ml Vial) 50 mcg PRN Q4HRS  PRN IVP SEVERE PAIN Last 

administered on 6/13/20at 05:15;  Start 5/18/20 at 13:15;  Stop 6/14/20 at 

09:29;  Status DC


Fentanyl Citrate (Fentanyl 2ml Vial) 25 mcg PRN Q4HRS  PRN IVP MODERATE PAIN 

Last administered on 6/13/20at 00:27;  Start 5/18/20 at 13:15;  Stop 6/14/20 at 

09:30;  Status DC


Potassium Chloride 90 meq/ Magnesium Sulfate 20 meq/ Multivitamins 10 

ml/Chromium/ Copper/Manganese/ Seleni/Zn 1 ml/ Insulin Human Regular 15 unit/ 

Total Parenteral Nutrition/Amino Acids/Dextrose/ Fat Emulsion Intravenous 1,890 

ml @  78.75 mls/ hr TPN  CONT IV  Last administered on 5/18/20at 22:18;  Start 

5/18/20 at 22:00;  Stop 5/19/20 at 21:59;  Status DC


Furosemide (Lasix) 40 mg 1X  ONCE IVP  Last administered on 5/18/20at 21:51;  

Start 5/18/20 at 21:45;  Stop 5/18/20 at 21:48;  Status DC


Albumin Human 100 ml @  100 mls/hr 1X PRN  PRN IV SEE COMMENTS;  Start 5/19/20 

at 01:30


Furosemide (Lasix) 40 mg BID92 IVP  Last administered on 6/3/20at 08:04;  Start 

5/19/20 at 14:00;  Stop 6/3/20 at 13:07;  Status DC


Potassium Chloride 90 meq/ Magnesium Sulfate 20 meq/ Multivitamins 10 

ml/Chromium/ Copper/Manganese/ Seleni/Zn 1 ml/ Insulin Human Regular 15 unit/ 

Total Parenteral Nutrition/Amino Acids/Dextrose/ Fat Emulsion Intravenous 1,800 

ml @  75 mls/hr TPN  CONT IV  Last administered on 5/19/20at 22:31;  Start 

5/19/20 at 22:00;  Stop 5/20/20 at 21:59;  Status DC


Potassium Chloride 90 meq/ Magnesium Sulfate 20 meq/ Multivitamins 10 

ml/Chromium/ Copper/Manganese/ Seleni/Zn 1 ml/ Insulin Human Regular 15 unit/ 

Total Parenteral Nutrition/Amino Acids/Dextrose/ Fat Emulsion Intravenous 1,800 

ml @  75 mls/hr TPN  CONT IV  Last administered on 5/20/20at 22:28;  Start 

5/20/20 at 22:00;  Stop 5/21/20 at 21:59;  Status DC


Potassium Chloride 110 meq/ Magnesium Sulfate 20 meq/ Multivitamins 10 

ml/Chromium/ Copper/Manganese/ Seleni/Zn 1 ml/ Insulin Human Regular 15 unit/ 

Total Parenteral Nutrition/Amino Acids/Dextrose/ Fat Emulsion Intravenous 1,800 

ml @  75 mls/hr TPN  CONT IV  Last administered on 5/21/20at 22:01;  Start 

5/21/20 at 22:00;  Stop 5/22/20 at 21:59;  Status DC


Saliva Substitute (Biotene Moisturizing Mouth) 2 spray PRN Q15MIN  PRN PO DRY 

MOUTH;  Start 5/21/20 at 11:00


Potassium Chloride 110 meq/ Magnesium Sulfate 20 meq/ Multivitamins 10 

ml/Chromium/ Copper/Manganese/ Seleni/Zn 1 ml/ Insulin Human Regular 15 unit/ 

Total Parenteral Nutrition/Amino Acids/Dextrose/ Fat Emulsion Intravenous 1,800 

ml @  75 mls/hr TPN  CONT IV  Last administered on 5/22/20at 22:21;  Start 

5/22/20 at 22:00;  Stop 5/23/20 at 21:59;  Status DC


Potassium Chloride 110 meq/ Magnesium Sulfate 20 meq/ Multivitamins 10 

ml/Chromium/ Copper/Manganese/ Seleni/Zn 1 ml/ Insulin Human Regular 15 unit/ 

Total Parenteral Nutrition/Amino Acids/Dextrose/ Fat Emulsion Intravenous 1,800 

ml @  75 mls/hr TPN  CONT IV  Last administered on 5/23/20at 22:04;  Start 

5/23/20 at 22:00;  Stop 5/24/20 at 21:59;  Status DC


Potassium Chloride 110 meq/ Magnesium Sulfate 20 meq/ Multivitamins 10 

ml/Chromium/ Copper/Manganese/ Seleni/Zn 1 ml/ Insulin Human Regular 15 unit/ 

Total Parenteral Nutrition/Amino Acids/Dextrose/ Fat Emulsion Intravenous 1,800 

ml @  75 mls/hr TPN  CONT IV  Last administered on 5/24/20at 22:48;  Start 

5/24/20 at 22:00;  Stop 5/25/20 at 21:59;  Status DC


Potassium Chloride 70 meq/ Magnesium Sulfate 20 meq/ Multivitamins 10 

ml/Chromium/ Copper/Manganese/ Seleni/Zn 1 ml/ Insulin Human Regular 15 unit/ 

Total Parenteral Nutrition/Amino Acids/Dextrose/ Fat Emulsion Intravenous 1,800 

ml @  75 mls/hr TPN  CONT IV  Last administered on 5/25/20at 21:39;  Start 

5/25/20 at 22:00;  Stop 5/26/20 at 21:59;  Status DC


Meropenem 500 mg/ Sodium Chloride 50 ml @  100 mls/hr Q6HRS IV  Last 

administered on 5/27/20at 06:02;  Start 5/25/20 at 18:00;  Stop 5/27/20 at 

09:59;  Status DC


Barium Sulfate (Varibar Thin Liquid Apple) 148 gm 1X  ONCE PO ;  Start 5/26/20 

at 11:45;  Stop 5/26/20 at 11:49;  Status DC


Potassium Chloride 70 meq/ Magnesium Sulfate 20 meq/ Multivitamins 10 

ml/Chromium/ Copper/Manganese/ Seleni/Zn 1 ml/ Insulin Human Regular 15 unit/ 

Total Parenteral Nutrition/Amino Acids/Dextrose/ Fat Emulsion Intravenous 1,800 

ml @  75 mls/hr TPN  CONT IV  Last administered on 5/26/20at 22:27;  Start 

5/26/20 at 22:00;  Stop 5/27/20 at 21:59;  Status DC


Piperacillin Sod/ Tazobactam Sod 3.375 gm/Sodium Chloride 50 ml @  100 mls/hr 

Q6HRS IV  Last administered on 6/4/20at 06:10;  Start 5/27/20 at 12:00;  Stop 

6/4/20 at 07:26;  Status DC


Potassium Chloride 70 meq/ Magnesium Sulfate 20 meq/ Multivitamins 10 

ml/Chromium/ Copper/Manganese/ Seleni/Zn 1 ml/ Insulin Human Regular 15 unit/ 

Total Parenteral Nutrition/Amino Acids/Dextrose/ Fat Emulsion Intravenous 1,800 

ml @  75 mls/hr TPN  CONT IV  Last administered on 5/27/20at 22:03;  Start 

5/27/20 at 22:00;  Stop 5/28/20 at 21:59;  Status DC


Potassium Chloride 70 meq/ Magnesium Sulfate 20 meq/ Multivitamins 10 

ml/Chromium/ Copper/Manganese/ Seleni/Zn 1 ml/ Insulin Human Regular 15 unit/ 

Total Parenteral Nutrition/Amino Acids/Dextrose/ Fat Emulsion Intravenous 1,800 

ml @  75 mls/hr TPN  CONT IV  Last administered on 5/28/20at 22:33;  Start 

5/28/20 at 22:00;  Stop 5/29/20 at 21:59;  Status DC


Potassium Chloride 70 meq/ Magnesium Sulfate 20 meq/ Multivitamins 10 

ml/Chromium/ Copper/Manganese/ Seleni/Zn 1 ml/ Insulin Human Regular 15 unit/ 

Total Parenteral Nutrition/Amino Acids/Dextrose/ Fat Emulsion Intravenous 1,800 

ml @  75 mls/hr TPN  CONT IV  Last administered on 5/29/20at 23:13;  Start 

5/29/20 at 22:00;  Stop 5/30/20 at 21:59;  Status DC


Potassium Chloride 80 meq/ Magnesium Sulfate 20 meq/ Multivitamins 10 

ml/Chromium/ Copper/Manganese/ Seleni/Zn 1 ml/ Insulin Human Regular 15 unit/ 

Total Parenteral Nutrition/Amino Acids/Dextrose/ Fat Emulsion Intravenous 1,800 

ml @  75 mls/hr TPN  CONT IV  Last administered on 5/30/20at 22:30;  Start 

5/30/20 at 22:00;  Stop 5/31/20 at 21:59;  Status DC


Potassium Chloride 80 meq/ Magnesium Sulfate 20 meq/ Multivitamins 10 

ml/Chromium/ Copper/Manganese/ Seleni/Zn 1 ml/ Insulin Human Regular 15 unit/ 

Total Parenteral Nutrition/Amino Acids/Dextrose/ Fat Emulsion Intravenous 1,800 

ml @  75 mls/hr TPN  CONT IV  Last administered on 5/31/20at 21:54;  Start 

5/31/20 at 22:00;  Stop 6/1/20 at 21:59;  Status DC


Potassium Chloride/Water 100 ml @  100 mls/hr 1X  ONCE IV  Last administered on 

6/1/20at 10:15;  Start 6/1/20 at 10:00;  Stop 6/1/20 at 10:59;  Status DC


Potassium Chloride 90 meq/ Magnesium Sulfate 20 meq/ Multivitamins 10 

ml/Chromium/ Copper/Manganese/ Seleni/Zn 1 ml/ Insulin Human Regular 20 unit/ 

Total Parenteral Nutrition/Amino Acids/Dextrose/ Fat Emulsion Intravenous 1,800 

ml @  75 mls/hr TPN  CONT IV  Last administered on 6/1/20at 22:28;  Start 6/1/20

at 22:00;  Stop 6/2/20 at 21:59;  Status DC


Potassium Chloride 90 meq/ Magnesium Sulfate 20 meq/ Multivitamins 10 

ml/Chromium/ Copper/Manganese/ Seleni/Zn 1 ml/ Insulin Human Regular 20 unit/ 

Total Parenteral Nutrition/Amino Acids/Dextrose/ Fat Emulsion Intravenous 1,800 

ml @  75 mls/hr TPN  CONT IV  Last administered on 6/2/20at 22:08;  Start 6/2/20

at 22:00;  Stop 6/3/20 at 21:59;  Status DC


Lorazepam (Ativan Inj) 0.25 mg PRN Q4HRS  PRN IVP ANXIETY / AGITATION Last adm

inistered on 6/13/20at 02:25;  Start 6/3/20 at 07:30


Potassium Chloride 90 meq/ Magnesium Sulfate 20 meq/ Multivitamins 10 ml/Ch

romium/ Copper/Manganese/ Seleni/Zn 1 ml/ Insulin Human Regular 20 unit/ Total 

Parenteral Nutrition/Amino Acids/Dextrose/ Fat Emulsion Intravenous 1,800 ml @  

75 mls/hr TPN  CONT IV  Last administered on 6/3/20at 23:13;  Start 6/3/20 at 

22:00;  Stop 6/4/20 at 21:59;  Status DC


Furosemide (Lasix) 40 mg DAILY IVP  Last administered on 6/5/20at 11:14;  Start 

6/3/20 at 13:30;  Stop 6/7/20 at 09:12;  Status DC


Fluoxetine HCl (PROzac) 20 mg QHS PEG  Last administered on 6/19/20at 22:08;  

Start 6/4/20 at 21:00


Fentanyl (Duragesic 50mcg/ Hr Patch) 1 patch Q72H TD  Last administered on 

6/4/20at 21:22;  Start 6/4/20 at 21:00;  Stop 6/13/20 at 12:00;  Status DC


Potassium Chloride 40 meq/ Potassium Acetate 60 meq/Magnesium Sulfate 10 meq/ 

Multivitamins 10 ml/Chromium/ Copper/Manganese/ Seleni/Zn 1 ml/ Insulin Human 

Regular 20 unit/ Total Parenteral Nutrition/Amino Acids/Dextrose/ Fat Emulsion 

Intravenous 1,800 ml @  75 mls/hr TPN  CONT IV  Last administered on 6/5/20at 

00:03;  Start 6/4/20 at 22:00;  Stop 6/5/20 at 21:59;  Status DC


Potassium Acetate 80 meq/Magnesium Sulfate 5 meq/ Multivitamins 10 ml/Chromium/ 

Copper/Manganese/ Seleni/Zn 1 ml/ Insulin Human Regular 20 unit/ Total 

Parenteral Nutrition/Amino Acids/Dextrose/ Fat Emulsion Intravenous 1,920 ml @  

80 mls/hr TPN  CONT IV  Last administered on 6/5/20at 21:59;  Start 6/5/20 at 

22:00;  Stop 6/6/20 at 21:59;  Status DC


Potassium Acetate 60 meq/Magnesium Sulfate 5 meq/ Multivitamins 10 ml/Chromium/ 

Copper/Manganese/ Seleni/Zn 1 ml/ Insulin Human Regular 30 unit/ Total 

Parenteral Nutrition/Amino Acids/Dextrose/ Fat Emulsion Intravenous 1,920 ml @  

80 mls/hr TPN  CONT IV  Last administered on 6/6/20at 21:54;  Start 6/6/20 at 

22:00;  Stop 6/7/20 at 21:59;  Status DC


Norepinephrine Bitartrate 8 mg/ Dextrose 258 ml @  13.332 mls/ hr CONT  PRN IV 

PER PROTOCOL Last administered on 6/7/20at 21:46;  Start 6/7/20 at 06:30


Albumin Human 500 ml @  125 mls/hr 1X  ONCE IV  Last administered on 6/7/20at 

08:10;  Start 6/7/20 at 08:15;  Stop 6/7/20 at 12:14;  Status DC


Potassium Acetate 40 meq/Magnesium Sulfate 5 meq/ Multivitamins 10 ml/Chromium/ 

Copper/Manganese/ Seleni/Zn 1 ml/ Insulin Human Regular 30 unit/ Total 

Parenteral Nutrition/Amino Acids/Dextrose/ Fat Emulsion Intravenous 1,920 ml @  

80 mls/hr TPN  CONT IV  Last administered on 6/7/20at 22:23;  Start 6/7/20 at 

22:00;  Stop 6/8/20 at 21:59;  Status DC


Meropenem 1 gm/ Sodium Chloride 100 ml @  200 mls/hr Q8HRS IV ;  Start 6/7/20 at

14:00;  Status Cancel


Meropenem 1 gm/ Sodium Chloride 100 ml @  200 mls/hr Q8HRS IV  Last administered

on 6/7/20at 11:04;  Start 6/7/20 at 10:00;  Stop 6/7/20 at 13:00;  Status DC


Meropenem 1 gm/ Sodium Chloride 100 ml @  200 mls/hr Q12HR IV  Last administered

on 6/19/20at 22:08;  Start 6/7/20 at 21:00


Sodium Chloride 1,000 ml @  1,000 mls/hr 1X  ONCE IV  Last administered on 

6/7/20at 11:06;  Start 6/7/20 at 10:45;  Stop 6/7/20 at 11:44;  Status DC


Micafungin Sodium 100 mg/Dextrose 100 ml @  100 mls/hr Q24H IV  Last 

administered on 6/19/20at 11:10;  Start 6/7/20 at 11:00


Daptomycin 410 mg/ Sodium Chloride 50 ml @  100 mls/hr Q24H IV  Last 

administered on 6/9/20at 13:33;  Start 6/7/20 at 14:00;  Stop 6/10/20 at 08:30; 

Status DC


Midazolam HCl (Versed) 2 mg STK-MED ONCE .ROUTE ;  Start 6/7/20 at 14:47;  Stop 

6/7/20 at 14:48;  Status DC


Fentanyl Citrate (Fentanyl 2ml Vial) 100 mcg STK-MED ONCE .ROUTE ;  Start 6/7/20

at 14:47;  Stop 6/7/20 at 14:48;  Status DC


Flumazenil (Romazicon) 0.5 mg STK-MED ONCE IV ;  Start 6/7/20 at 14:48;  Stop 

6/7/20 at 14:48;  Status DC


Naloxone HCl (Narcan) 0.4 mg STK-MED ONCE .ROUTE ;  Start 6/7/20 at 14:48;  Stop

6/7/20 at 14:48;  Status DC


Lidocaine HCl (Lidocaine 1% 20ml Vial) 20 ml STK-MED ONCE .ROUTE ;  Start 6/7/20

at 14:48;  Stop 6/7/20 at 14:48;  Status DC


Midazolam HCl (Versed) 2 mg 1X  ONCE IV  Last administered on 6/7/20at 15:28;  

Start 6/7/20 at 15:00;  Stop 6/7/20 at 15:01;  Status DC


Fentanyl Citrate (Fentanyl 2ml Vial) 100 mcg 1X  ONCE IV  Last administered on 

6/7/20at 15:28;  Start 6/7/20 at 15:00;  Stop 6/7/20 at 15:01;  Status DC


Lidocaine HCl (Lidocaine 1% 20ml Vial) 20 ml 1X  ONCE INJ  Last administered on 

6/7/20at 15:30;  Start 6/7/20 at 15:00;  Stop 6/7/20 at 15:01;  Status DC


Sodium Chloride 1,000 ml @  100 mls/hr Q10H IV  Last administered on 6/16/20at 

07:30;  Start 6/7/20 at 20:00;  Stop 6/16/20 at 11:26;  Status DC


Sodium Bicarbonate (Sodium Bicarb Adult 8.4% Syr) 50 meq 1X  ONCE IV  Last 

administered on 6/7/20at 21:47;  Start 6/7/20 at 22:00;  Stop 6/7/20 at 22:01;  

Status DC


Potassium Acetate 40 meq/Magnesium Sulfate 5 meq/ Multivitamins 10 ml/Chromium/ 

Copper/Manganese/ Seleni/Zn 1 ml/ Insulin Human Regular 30 unit/ Total 

Parenteral Nutrition/Amino Acids/Dextrose/ Fat Emulsion Intravenous 1,920 ml @  

80 mls/hr TPN  CONT IV  Last administered on 6/8/20at 22:28;  Start 6/8/20 at 

22:00;  Stop 6/9/20 at 21:59;  Status DC


Sodium Chloride 500 ml @  500 mls/hr 1X  ONCE IV  Last administered on 6/9/20at 

06:39;  Start 6/9/20 at 06:45;  Stop 6/9/20 at 07:44;  Status DC


Potassium Acetate 40 meq/Magnesium Sulfate 5 meq/ Multivitamins 10 ml/Chromium/ 

Copper/Manganese/ Seleni/Zn 1 ml/ Insulin Human Regular 30 unit/ Total 

Parenteral Nutrition/Amino Acids/Dextrose/ Fat Emulsion Intravenous 1,920 ml @  

80 mls/hr TPN  CONT IV  Last administered on 6/9/20at 22:03;  Start 6/9/20 at 

22:00;  Stop 6/10/20 at 21:59;  Status DC


Metoprolol Tartrate (Lopressor Vial) 5 mg PRN Q6HRS  PRN IVP HYPERTENSION Last 

administered on 6/18/20at 10:14;  Start 6/10/20 at 09:00


Potassium Acetate 40 meq/Magnesium Sulfate 5 meq/ Multivitamins 10 ml/Chromium/ 

Copper/Manganese/ Seleni/Zn 1 ml/ Insulin Human Regular 30 unit/ Total 

Parenteral Nutrition/Amino Acids/Dextrose/ Fat Emulsion Intravenous 1,920 ml @  

80 mls/hr TPN  CONT IV  Last administered on 6/10/20at 21:26;  Start 6/10/20 at 

22:00;  Stop 6/11/20 at 21:59;  Status DC


Potassium Acetate 40 meq/Magnesium Sulfate 5 meq/ Multivitamins 10 ml/Chromium/ 

Copper/Manganese/ Seleni/Zn 1 ml/ Insulin Human Regular 30 unit/ Total Par

enteral Nutrition/Amino Acids/Dextrose/ Fat Emulsion Intravenous 1,920 ml @  80 

mls/hr TPN  CONT IV  Last administered on 6/11/20at 23:23;  Start 6/11/20 at 

22:00;  Stop 6/12/20 at 21:59;  Status DC


Potassium Acetate 40 meq/Magnesium Sulfate 5 meq/ Multivitamins 10 ml/Chromium/ 

Copper/Manganese/ Seleni/Zn 1 ml/ Insulin Human Regular 30 unit/ Total 

Parenteral Nutrition/Amino Acids/Dextrose/ Fat Emulsion Intravenous 1,920 ml @  

80 mls/hr TPN  CONT IV  Last administered on 6/12/20at 21:35;  Start 6/12/20 at 

22:00;  Stop 6/13/20 at 21:59;  Status DC


Furosemide (Lasix) 20 mg 1X  ONCE IVP  Last administered on 6/13/20at 06:26;  

Start 6/13/20 at 06:15;  Stop 6/13/20 at 06:16;  Status DC


Methylprednisolone Sodium Succinate (SOLU-Medrol 125MG VIAL) 125 mg 1X  ONCE IV 

Last administered on 6/13/20at 06:26;  Start 6/13/20 at 06:15;  Stop 6/13/20 at 

06:16;  Status DC


Albuterol/ Ipratropium (Duoneb) 3 ml Q4HRS NEB  Last administered on 6/20/20at 

04:00;  Start 6/13/20 at 08:00


Fentanyl Citrate 30 ml @ 0 mls/hr CONT  PRN IV SEE PROTOCOL Last administered on

6/20/20at 05:56;  Start 6/13/20 at 06:00


Propofol 100 ml @ 0 mls/hr CONT  PRN IV SEE PROTOCOL Last administered on 

6/20/20at 00:12;  Start 6/13/20 at 06:00


Fentanyl Citrate (Fentanyl 2ml Vial) 25 mcg PRN Q1HR  PRN IV SEE COMMENTS;  

Start 6/13/20 at 06:00


Fentanyl Citrate (Fentanyl 2ml Vial) 50 mcg PRN Q1HR  PRN IV SEE COMMENTS;  

Start 6/13/20 at 06:00


Chlorhexidine Gluconate (Peridex) 15 ml BID MM ;  Start 6/13/20 at 09:00;  Stop 

6/13/20 at 07:58;  Status DC


Potassium Acetate 40 meq/Magnesium Sulfate 5 meq/ Multivitamins 10 ml/Chromium/ 

Copper/Manganese/ Seleni/Zn 1 ml/ Insulin Human Regular 30 unit/ Total 

Parenteral Nutrition/Amino Acids/Dextrose/ Fat Emulsion Intravenous 1,920 ml @  

80 mls/hr TPN  CONT IV  Last administered on 6/13/20at 21:19;  Start 6/13/20 at 

22:00;  Stop 6/14/20 at 21:59;  Status DC


Acetylcysteine (Mucomyst 20% Resp Treatment) 600 mg BID NEB  Last administered 

on 6/19/20at 09:33;  Start 6/13/20 at 21:00;  Stop 6/19/20 at 10:39;  Status DC


Magnesium Sulfate 100 ml @  25 mls/hr 1X  ONCE IV  Last administered on 

6/13/20at 15:48;  Start 6/13/20 at 15:45;  Stop 6/13/20 at 19:44;  Status DC


Potassium Acetate 40 meq/Magnesium Sulfate 5 meq/ Multivitamins 10 ml/Chromium/ 

Copper/Manganese/ Seleni/Zn 1 ml/ Insulin Human Regular 30 unit/ Total 

Parenteral Nutrition/Amino Acids/Dextrose/ Fat Emulsion Intravenous 1,920 ml @  

80 mls/hr TPN  CONT IV  Last administered on 6/14/20at 21:35;  Start 6/14/20 at 

22:00;  Stop 6/15/20 at 21:59;  Status DC


Potassium Chloride/Water 100 ml @  100 mls/hr Q1H IV  Last administered on 

6/15/20at 08:31;  Start 6/15/20 at 07:00;  Stop 6/15/20 at 08:59;  Status DC


Potassium Acetate 40 meq/Magnesium Sulfate 5 meq/ Multivitamins 10 ml/Chromium/ 

Copper/Manganese/ Seleni/Zn 1 ml/ Insulin Human Regular 30 unit/ Total 

Parenteral Nutrition/Amino Acids/Dextrose/ Fat Emulsion Intravenous 1,920 ml @  

80 mls/hr TPN  CONT IV  Last administered on 6/15/20at 21:54;  Start 6/15/20 at 

22:00;  Stop 6/16/20 at 19:34;  Status DC


Lidocaine HCl (Buffered Lidocaine 1%) 3 ml STK-MED ONCE .ROUTE ;  Start 6/15/20 

at 12:14;  Stop 6/15/20 at 12:14;  Status DC


Lidocaine HCl (Buffered Lidocaine 1%) 3 ml 1X  ONCE IJ  Last administered on 

6/15/20at 13:11;  Start 6/15/20 at 13:00;  Stop 6/15/20 at 13:01;  Status DC


Magnesium Sulfate 50 ml @ 25 mls/hr 1X  ONCE IV ;  Start 6/16/20 at 08:15;  Stop

6/16/20 at 10:14;  Status DC


Potassium Acetate 40 meq/Magnesium Sulfate 10 meq/ Multivitamins 10 ml/Chromium/

Copper/Manganese/ Seleni/Zn 1 ml/ Insulin Human Regular 20 unit/ Total Pa

renteral Nutrition/Amino Acids/Dextrose/ Fat Emulsion Intravenous 1,920 ml @  80

mls/hr TPN  CONT IV  Last administered on 6/16/20at 21:32;  Start 6/16/20 at 

22:00;  Stop 6/17/20 at 21:59;  Status DC


Potassium Chloride/Water 100 ml @  100 mls/hr Q1H IV  Last administered on 

6/17/20at 09:12;  Start 6/17/20 at 08:00;  Stop 6/17/20 at 09:59;  Status DC


Alteplase, Recombinant (Cathflo For Central Catheter Clearance) 4 mg 1X  ONCE 

INT CAT ;  Start 6/17/20 at 09:15;  Stop 6/17/20 at 09:16;  Status UNV


Alteplase, Recombinant (Cathflo For Central Catheter Clearance) 4 mg 1X  ONCE 

INT CAT ;  Start 6/17/20 at 09:15;  Stop 6/17/20 at 09:16;  Status UNV


Alteplase, Recombinant (Cathflo For Central Catheter Clearance) 4 mg 1X  ONCE 

INT CAT ;  Start 6/17/20 at 09:15;  Stop 6/17/20 at 09:16;  Status UNV


Alteplase, Recombinant 4 mg/ Sodium Chloride 20 ml @ 20 mls/hr 1X  ONCE IV  Last

administered on 6/17/20at 10:10;  Start 6/17/20 at 10:00;  Stop 6/17/20 at 

10:59;  Status DC


Alteplase, Recombinant 4 mg/ Sodium Chloride 20 ml @ 20 mls/hr 1X  ONCE IV  Last

administered on 6/17/20at 10:09;  Start 6/17/20 at 10:00;  Stop 6/17/20 at 

10:59;  Status DC


Alteplase, Recombinant 4 mg/ Sodium Chloride 20 ml @ 20 mls/hr 1X  ONCE IV  Last

administered on 6/17/20at 10:09;  Start 6/17/20 at 10:00;  Stop 6/17/20 at 

10:59;  Status DC


Potassium Acetate 60 meq/Magnesium Sulfate 10 meq/ Multivitamins 10 ml/Chromium/

Copper/Manganese/ Seleni/Zn 1 ml/ Insulin Human Regular 20 unit/ Total 

Parenteral Nutrition/Amino Acids/Dextrose/ Fat Emulsion Intravenous 1,920 ml @  

80 mls/hr TPN  CONT IV  Last administered on 6/17/20at 21:55;  Start 6/17/20 at 

22:00;  Stop 6/18/20 at 21:59;  Status DC


Albumin Human 500 ml @  125 mls/hr 1X  ONCE IV  Last administered on 6/18/20at 

12:01;  Start 6/18/20 at 11:15;  Stop 6/18/20 at 15:14;  Status DC


Sodium Chloride 500 ml @  500 mls/hr 1X  ONCE IV  Last administered on 6/18/20at

13:50;  Start 6/18/20 at 11:15;  Stop 6/18/20 at 12:14;  Status DC


Potassium Acetate 60 meq/Magnesium Sulfate 14 meq/ Multivitamins 10 ml/Chromium/

Copper/Manganese/ Seleni/Zn 1 ml/ Insulin Human Regular 20 unit/ Total 

Parenteral Nutrition/Amino Acids/Dextrose/ Fat Emulsion Intravenous 1,920 ml @  

80 mls/hr TPN  CONT IV  Last administered on 6/18/20at 22:26;  Start 6/18/20 at 

22:00;  Stop 6/19/20 at 21:59;  Status DC


Ciprofloxacin/ Dextrose 200 ml @  200 mls/hr Q12HR IV  Last administered on 

6/19/20at 22:08;  Start 6/18/20 at 21:00


Albumin Human 250 ml @  62.5 mls/hr 1X  ONCE IV  Last administered on 6/19/20at 

11:09;  Start 6/19/20 at 11:00;  Stop 6/19/20 at 14:59;  Status DC


Furosemide (Lasix) 20 mg 1X  ONCE IVP  Last administered on 6/19/20at 14:52;  

Start 6/19/20 at 10:45;  Stop 6/19/20 at 10:49;  Status DC


Potassium Acetate 60 meq/Magnesium Sulfate 14 meq/ Multivitamins 10 ml/Chromium/

Copper/Manganese/ Seleni/Zn 1 ml/ Insulin Human Regular 15 unit/ Total 

Parenteral Nutrition/Amino Acids/Dextrose/ Fat Emulsion Intravenous 1,920 ml @  

80 mls/hr TPN  CONT IV  Last administered on 6/19/20at 22:08;  Start 6/19/20 at 

22:00;  Stop 6/20/20 at 21:59





Active Scripts


Active


Reported


Bisoprolol Fumarate 5 Mg Tablet 10 Mg PO DAILY


Vitals/I & O





Vital Sign - Last 24 Hours








 6/19/20 6/19/20 6/19/20 6/19/20





 09:00 09:32 10:00 10:00


 


Pulse 112   120


 


Resp 20   18


 


B/P (MAP) 104/61 (75)   116/68 (84)


 


Pulse Ox 98 98 98 99


 


O2 Delivery Ventilator Ventilator Tracheal Collar Tracheal Collar


 


O2 Flow Rate   9.0 





 6/19/20 6/19/20 6/19/20 6/19/20





 11:00 11:35 12:00 12:00


 


Temp    98.3





    98.3


 


Pulse 118   111


 


Resp 18   18


 


B/P (MAP) 102/59 (73)   95/49 (64)


 


Pulse Ox 100  98 100


 


O2 Delivery Tracheal Collar Trach Collar Tracheal Collar Tracheal Collar


 


O2 Flow Rate   9.0 


 


    





    





 6/19/20 6/19/20 6/19/20 6/19/20





 12:29 13:00 13:00 14:00


 


Pulse   112 114


 


Resp 22 22 21 26


 


B/P (MAP)   105/58 (74) 101/65 (77)


 


Pulse Ox 98 99 97 98


 


O2 Delivery Tracheal Collar Tracheal Collar Tracheal Collar Tracheal Collar





 6/19/20 6/19/20 6/19/20 6/19/20





 15:00 15:37 16:00 16:00


 


Pulse 118   128


 


Resp 24   31


 


B/P (MAP) 120/74 (89)   108/94 (99)


 


Pulse Ox 100 97  93


 


O2 Delivery Tracheal Collar Tracheal Collar Trach Collar Tracheal Collar


 


O2 Flow Rate  9.0  





 6/19/20 6/19/20 6/19/20 6/19/20





 17:00 18:00 19:00 20:00


 


Temp 100.7   97.8





 100.7   97.8


 


Pulse 123 120 115 114


 


Resp 22 22 22 22


 


B/P (MAP) 108/72 (84) 107/63 (78) 107/59 (75) 101/64 (76)


 


Pulse Ox 99 99 99 98


 


O2 Delivery Tracheal Collar Tracheal Collar Tracheal Collar Tracheal Collar


 


    





    





 6/19/20 6/19/20 6/19/20 6/19/20





 20:05 20:09 21:00 22:00


 


Pulse   116 110


 


Resp   22 22


 


B/P (MAP)   103/70 (81) 99/61 (74)


 


Pulse Ox  98 99 99


 


O2 Delivery Trach Collar Tracheal Collar Tracheal Collar Tracheal Collar


 


O2 Flow Rate  8.0  





 6/19/20 6/19/20 6/20/20 6/20/20





 23:00 23:45 00:00 00:03


 


Temp   98.2 





   98.2 


 


Pulse 108  108 


 


Resp 22  22 


 


B/P (MAP) 102/58 (73)  97/56 (70) 


 


Pulse Ox 99  99 98


 


O2 Delivery Tracheal Collar Trach Collar Tracheal Collar Ventilator


 


    





    





 6/20/20 6/20/20 6/20/20 6/20/20





 01:00 02:00 03:00 04:00


 


Temp   100.1 





   100.1 


 


Pulse 94 108 108 


 


Resp 22 22 22 


 


B/P (MAP) 96/49 (65) 92/56 (68) 102/58 (73) 


 


Pulse Ox 99 99 99 


 


O2 Delivery Ventilator Ventilator Tracheal Collar Mechanical Ventilator


 


    





    





 6/20/20 6/20/20 6/20/20 6/20/20





 04:00 04:00 04:08 05:00


 


Pulse 108 108  108


 


Resp 22 22 22


 


B/P (MAP) 92/56 (68) 92/56 (68)  90/53 (65)


 


Pulse Ox 99 99 98 99


 


O2 Delivery Ventilator Ventilator Ventilator Ventilator





 6/20/20 6/20/20 6/20/20 6/20/20





 05:00 05:56 06:00 06:00


 


Pulse 108  106 108


 


Resp 22 17 22 22


 


B/P (MAP) 92/56 (68)  89/58 (68) 89/59 (69)


 


Pulse Ox 99 100 99 99


 


O2 Delivery Ventilator Ventilator Ventilator Ventilator














Intake and Output   


 


 6/19/20 6/19/20 6/20/20





 15:00 23:00 07:00


 


Intake Total   915 ml


 


Output Total 800 ml 1635 ml 425 ml


 


Balance -800 ml -1635 ml 490 ml











Hemodynamically unstable?:  No


Is patient in severe pain?:  No


Is NPO status required?:  No











OVIDIO SARAVIA MD          Jun 20, 2020 08:38

## 2020-06-20 NOTE — PDOC
PROGRESS NOTES


Subjective


Subjective


Had an episode of vomiting





Objective


Objective





Vital Signs








  Date Time  Temp Pulse Resp B/P (MAP) Pulse Ox O2 Delivery O2 Flow Rate FiO2


 


6/20/20 08:00      Mechanical Ventilator  


 


6/20/20 06:00  108 22 89/59 (69) 99   


 


6/20/20 03:00 100.1       





 100.1       


 


6/19/20 20:09       8.0 














Intake and Output 


 


 6/20/20





 07:00


 


Intake Total 915 ml


 


Output Total 2860 ml


 


Balance -1945 ml


 


 


 


IV Total 915 ml


 


Output Urine Total 2205 ml


 


Chest Tube Drainage Total 190 ml


 


Drainage Total 65 ml


 


Other 400 ml











Physical Exam


Abdomen:  Soft, Other (NGT in place, anasarcic)


Heart:  Regular rate (SR/ST), Other (distant heart sounds)


Extremities:  No cyanosis, Other (3+ bilateral LE pitting edema)


General:  Alert, Cooperative, No acute distress


HEENT:  Other (s/p tracheostomy)


Lungs:  Other (diminished bases, trach with trach shield, chest tube in place 

with serous fluid)


Neuro:  Other (nonverbal due to trach)


Psych/Mental Status:  Other (flat affect)


Skin:  No rashes, No significant lesion





Diagnosis


RENAL FAILURE:  Acute (Acute tubular necrosis)





Assessment


Assessment


1. Severe acute gallstone pancreatitis with necrosis


2. Acute respiratory failure: presently has trach and chest tube.  Pulmonary 

team following


3. Anasarca with severe protein malnutrition


4. Sepsis with MDRO: off pressor. BP low marginal but stable, not requiring 

pressors


5. Acute diastolic CHF: multifactorial as above. No SOA. 


6. Normocytic anemia: Hgb stable at the 8s post multiple transfusion


7. Sinus tachycardia: Most probably physiologic


8. Severe JED requiring HD: last dialysis about a month ago





Recommendations


1. Continue current regimen. Lasix PRN, TPN, albumin repletion


2. Lopressor PRN if BP allows for sustained SVTs. 


3.  2D echo pending





Plan


Plan of Care


Problems


Medical Problems:


(1) Acute pancreatitis


Status: Acute  





(2) Cholelithiasis


Status: Acute  











Comment


Review of Relevant


I have reviewed the following items josy (where applicable) has been applied.


Labs





Laboratory Tests








Test


 6/19/20


12:25 6/19/20


14:43 6/19/20


23:40 6/20/20


05:50


 


Glucose (Fingerstick)


 185 mg/dL


(70-99) 


 147 mg/dL


(70-99) 





 


Ionized Calcium


 


 1.50 mmol/L


(1.13-1.32) 


 





 


Iron Level


 


 8 ug/dL


() 


 





 


Total Iron Binding Capacity


 


 56 ug/dL


(250-450) 


 





 


Iron Saturation  14 % (15-34)   


 


White Blood Count


 


 


 


 7.7 x10^3/uL


(4.0-11.0)


 


Red Blood Count


 


 


 


 2.76 x10^6/uL


(3.50-5.40)


 


Hemoglobin


 


 


 


 7.8 g/dL


(12.0-15.5)


 


Hematocrit


 


 


 


 23.6 %


(36.0-47.0)


 


Mean Corpuscular Volume    85 fL () 


 


Mean Corpuscular Hemoglobin    28 pg (25-35) 


 


Mean Corpuscular Hemoglobin


Concent 


 


 


 33 g/dL


(31-37)


 


Red Cell Distribution Width


 


 


 


 18.2 %


(11.5-14.5)


 


Platelet Count


 


 


 


 479 x10^3/uL


(140-400)


 


Neutrophils (%) (Auto)    74 % (31-73) 


 


Lymphocytes (%) (Auto)    16 % (24-48) 


 


Monocytes (%) (Auto)    9 % (0-9) 


 


Eosinophils (%) (Auto)    2 % (0-3) 


 


Basophils (%) (Auto)    0 % (0-3) 


 


Neutrophils # (Auto)


 


 


 


 5.6 x10^3/uL


(1.8-7.7)


 


Lymphocytes # (Auto)


 


 


 


 1.2 x10^3/uL


(1.0-4.8)


 


Monocytes # (Auto)


 


 


 


 0.7 x10^3/uL


(0.0-1.1)


 


Eosinophils # (Auto)


 


 


 


 0.1 x10^3/uL


(0.0-0.7)


 


Basophils # (Auto)


 


 


 


 0.0 x10^3/uL


(0.0-0.2)


 


Sodium Level


 


 


 


 137 mmol/L


(136-145)


 


Potassium Level


 


 


 


 3.8 mmol/L


(3.5-5.1)


 


Chloride Level


 


 


 


 103 mmol/L


()


 


Carbon Dioxide Level


 


 


 


 28 mmol/L


(21-32)


 


Anion Gap    6 (6-14) 


 


Blood Urea Nitrogen


 


 


 


 17 mg/dL


(7-20)


 


Creatinine


 


 


 


 0.6 mg/dL


(0.6-1.0)


 


Estimated GFR


(Cockcroft-Gault) 


 


 


 106.3 





 


BUN/Creatinine Ratio    28 (6-20) 


 


Glucose Level


 


 


 


 134 mg/dL


(70-99)


 


Calcium Level


 


 


 


 9.4 mg/dL


(8.5-10.1)


 


Total Bilirubin


 


 


 


 0.4 mg/dL


(0.2-1.0)


 


Aspartate Amino Transf


(AST/SGOT) 


 


 


 17 U/L (15-37) 





 


Alanine Aminotransferase


(ALT/SGPT) 


 


 


 18 U/L (14-59) 





 


Alkaline Phosphatase


 


 


 


 101 U/L


()


 


Total Protein


 


 


 


 4.4 g/dL


(6.4-8.2)


 


Albumin


 


 


 


 1.1 g/dL


(3.4-5.0)


 


Albumin/Globulin Ratio    0.3 (1.0-1.7) 


 


Test


 6/20/20


05:55 


 


 





 


Glucose (Fingerstick)


 131 mg/dL


(70-99) 


 


 











Microbiology


6/18/20 Blood Culture - Preliminary, Resulted


          NO GROWTH AFTER 1 DAY


6/15/20 Gram Stain - Final, Resulted


          


6/15/20 Aerobic and Anaerobic Culture - Preliminary, Resulted


          


6/13/20 Gram Stain Evaluation - Final, Complete


          


6/13/20 Respiratory Culture - Final, Complete


          


6/13/20 Antimicrobic Susceptibility - Final, Complete


          


6/7/20 Urine Culture - Final, Complete


         


5/30/20 Gram Stain - Final, Complete


          


5/30/20 Aerobic Culture - Final, Complete


Medications





Current Medications


Albumin Human 250 ml @  62.5 mls/hr 1X  ONCE IV  Last administered on 6/19/20at 

11:09;  Start 6/19/20 at 11:00;  Stop 6/19/20 at 14:59;  Status DC


Furosemide (Lasix) 20 mg 1X  ONCE IVP  Last administered on 6/19/20at 14:52;  

Start 6/19/20 at 10:45;  Stop 6/19/20 at 10:49;  Status DC


Potassium Acetate 60 meq/Magnesium Sulfate 14 meq/ Multivitamins 10 ml/Chromium/

Copper/Manganese/ Seleni/Zn 1 ml/ Insulin Human Regular 15 unit/ Total 

Parenteral Nutrition/Amino Acids/Dextrose/ Fat Emulsion Intravenous 1,920 ml @  

80 mls/hr TPN  CONT IV  Last administered on 6/19/20at 22:08;  Start 6/19/20 at 

22:00;  Stop 6/20/20 at 21:59


Potassium Acetate 60 meq/Magnesium Sulfate 14 meq/ Multivitamins 10 ml/Chromium/

Copper/Manganese/ Seleni/Zn 1 ml/ Insulin Human Regular 15 unit/ Total 

Parenteral Nutrition/Amino Acids/Dextrose/ Fat Emulsion Intravenous 1,920 ml @  

80 mls/hr TPN  CONT IV ;  Start 6/20/20 at 22:00;  Stop 6/21/20 at 21:59


Vitals/I & O





Vital Sign - Last 24 Hours








 6/19/20 6/19/20 6/19/20 6/19/20





 11:00 11:35 12:00 12:00


 


Temp    98.3





    98.3


 


Pulse 118   111


 


Resp 18   18


 


B/P (MAP) 102/59 (73)   95/49 (64)


 


Pulse Ox 100  98 100


 


O2 Delivery Tracheal Collar Trach Collar Tracheal Collar Tracheal Collar


 


O2 Flow Rate   9.0 


 


    





    





 6/19/20 6/19/20 6/19/20 6/19/20





 12:29 13:00 13:00 14:00


 


Pulse   112 114


 


Resp 22 22 21 26


 


B/P (MAP)   105/58 (74) 101/65 (77)


 


Pulse Ox 98 99 97 98


 


O2 Delivery Tracheal Collar Tracheal Collar Tracheal Collar Tracheal Collar





 6/19/20 6/19/20 6/19/20 6/19/20





 15:00 15:37 16:00 16:00


 


Pulse 118   128


 


Resp 24   31


 


B/P (MAP) 120/74 (89)   108/94 (99)


 


Pulse Ox 100 97  93


 


O2 Delivery Tracheal Collar Tracheal Collar Trach Collar Tracheal Collar


 


O2 Flow Rate  9.0  





 6/19/20 6/19/20 6/19/20 6/19/20





 17:00 18:00 19:00 20:00


 


Temp 100.7   97.8





 100.7   97.8


 


Pulse 123 120 115 114


 


Resp 22 22 22 22


 


B/P (MAP) 108/72 (84) 107/63 (78) 107/59 (75) 101/64 (76)


 


Pulse Ox 99 99 99 98


 


O2 Delivery Tracheal Collar Tracheal Collar Tracheal Collar Tracheal Collar


 


    





    





 6/19/20 6/19/20 6/19/20 6/19/20





 20:05 20:09 21:00 22:00


 


Pulse   116 110


 


Resp   22 22


 


B/P (MAP)   103/70 (81) 99/61 (74)


 


Pulse Ox  98 99 99


 


O2 Delivery Trach Collar Tracheal Collar Tracheal Collar Tracheal Collar


 


O2 Flow Rate  8.0  





 6/19/20 6/19/20 6/20/20 6/20/20





 23:00 23:45 00:00 00:03


 


Temp   98.2 





   98.2 


 


Pulse 108  108 


 


Resp 22 22 


 


B/P (MAP) 102/58 (73)  97/56 (70) 


 


Pulse Ox 99  99 98


 


O2 Delivery Tracheal Collar Trach Collar Tracheal Collar Ventilator


 


    





    





 6/20/20 6/20/20 6/20/20 6/20/20





 01:00 02:00 03:00 04:00


 


Temp   100.1 





   100.1 


 


Pulse 94 108 108 


 


Resp 22 22 22 


 


B/P (MAP) 96/49 (65) 92/56 (68) 102/58 (73) 


 


Pulse Ox 99 99 99 


 


O2 Delivery Ventilator Ventilator Tracheal Collar Mechanical Ventilator


 


    





    





 6/20/20 6/20/20 6/20/20 6/20/20





 04:00 04:00 04:08 05:00


 


Pulse 108 108  108


 


Resp 22 22 22


 


B/P (MAP) 92/56 (68) 92/56 (68)  90/53 (65)


 


Pulse Ox 99 99 98 99


 


O2 Delivery Ventilator Ventilator Ventilator Ventilator





 6/20/20 6/20/20 6/20/20 6/20/20





 05:00 05:56 06:00 06:00


 


Pulse 108  106 108


 


Resp 22 17 22 22


 


B/P (MAP) 92/56 (68)  89/58 (68) 89/59 (69)


 


Pulse Ox 99 100 99 99


 


O2 Delivery Ventilator Ventilator Ventilator Ventilator





 6/20/20   





 08:00   


 


O2 Delivery Mechanical Ventilator   














Intake and Output   


 


 6/19/20 6/19/20 6/20/20





 15:00 23:00 07:00


 


Intake Total   915 ml


 


Output Total 800 ml 1635 ml 425 ml


 


Balance -800 ml -1635 ml 490 ml

















RENAY MEMBRENO MD           Jun 20, 2020 10:18

## 2020-06-20 NOTE — PDOC
PULMONARY PROGRESS NOTES


Subjective


on vent overnight, off vent during day/ , no distress, denies sob


Patient intubated on 3/23 , s/p trach 4/6, 


transferred back to ICU 6/5 for syncope with collapse


developed anemia, blood drainage from RLQ abdomen drain site, and surrounding 

firmness  / developed septic shock 6/7 from abdomen source, required levo 6/7


s/p 3 new drains 6/7 with brown color drainage/ oozing fluid around drains


Vitals





Vital Signs








  Date Time  Temp Pulse Resp B/P (MAP) Pulse Ox O2 Delivery O2 Flow Rate FiO2


 


6/20/20 06:00  108 22 89/59 (69) 99 Ventilator  


 


6/20/20 03:00 100.1       





 100.1       


 


6/19/20 20:09       8.0 








Comments


awake,no soa


General:  Alert, No acute distress


HEENT:  Other (nc at perrl   nose clear    neck  trach site ok   no lab  no 

thyromegaly)


Lungs:  Other (diminshed in bases, Rhonci in LLL)


Cardiovascular:  S1, S2


Abdomen:  Soft, Non-tender, Other (bleeding from Sx. site RLQ and firmness)


Extremities:  Other (+1 BLE edema)


Skin:  Warm, Dry


Labs





Laboratory Tests








Test


 6/18/20


08:04 6/18/20


11:45 6/18/20


17:31 6/19/20


01:26


 


O2 Saturation 98 % (92-99)    


 


Arterial Blood pH


 7.49


(7.35-7.45) 


 


 





 


Arterial Blood pCO2 at


Patient Temp 28 mmHg


(35-46) 


 


 





 


Arterial Blood pO2 at Patient


Temp 116 mmHg


() 


 


 





 


Arterial Blood HCO3


 21 mmol/L


(21-28) 


 


 





 


Arterial Blood Base Excess


 -2 mmol/L


(-3-3) 


 


 





 


FiO2 35%    


 


Glucose (Fingerstick)


 


 148 mg/dL


(70-99) 135 mg/dL


(70-99) 116 mg/dL


(70-99)


 


Test


 6/19/20


06:20 6/19/20


06:30 6/19/20


12:25 6/19/20


14:43


 


White Blood Count


 8.9 x10^3/uL


(4.0-11.0) 


 


 





 


Red Blood Count


 2.94 x10^6/uL


(3.50-5.70) 


 


 





 


Hemoglobin


 8.2 g/dL


(12.0-15.5) 


 


 





 


Hematocrit


 24.9 %


(36.0-47.0) 


 


 





 


Mean Corpuscular Volume 85 fL ()    


 


Mean Corpuscular Hemoglobin 28 pg (25-35)    


 


Mean Corpuscular Hemoglobin


Concent 33 g/dL


(31-37) 


 


 





 


Red Cell Distribution Width


 18.2 %


(11.5-14.5) 


 


 





 


Platelet Count


 452 x10^3/uL


(140-400) 


 


 





 


Neutrophils (%) (Auto) 77 % (31-73)    


 


Lymphocytes (%) (Auto) 15 % (24-48)    


 


Monocytes (%) (Auto) 7 % (0-9)    


 


Eosinophils (%) (Auto) 1 % (0-3)    


 


Basophils (%) (Auto) 0 % (0-3)    


 


Neutrophils # (Auto)


 6.8 x10^3/uL


(1.8-7.7) 


 


 





 


Lymphocytes # (Auto)


 1.3 x10^3/uL


(1.0-4.8) 


 


 





 


Monocytes # (Auto)


 0.6 x10^3/uL


(0.0-1.1) 


 


 





 


Eosinophils # (Auto)


 0.1 x10^3/uL


(0.0-0.7) 


 


 





 


Basophils # (Auto)


 0.0 x10^3/uL


(0.0-0.2) 


 


 





 


Absolute Reticulocyte Count


 0.120 x10^6/uL


(0.020-0.120) 


 


 





 


Percent Reticulocyte Count


 4.1 %


(0.5-2.3) 


 


 





 


Immature Reticulocyte Fraction


 0.26


(0.20-0.60) 


 


 





 


Sodium Level


 137 mmol/L


(136-145) 


 


 





 


Potassium Level


 3.9 mmol/L


(3.5-5.1) 


 


 





 


Chloride Level


 105 mmol/L


() 


 


 





 


Carbon Dioxide Level


 26 mmol/L


(21-32) 


 


 





 


Anion Gap 6 (6-14)    


 


Blood Urea Nitrogen


 18 mg/dL


(7-20) 


 


 





 


Creatinine


 0.6 mg/dL


(0.6-1.0) 


 


 





 


Estimated GFR


(Cockcroft-Gault) 106.3 


 


 


 





 


BUN/Creatinine Ratio 30 (6-20)    


 


Glucose Level


 109 mg/dL


(70-99) 


 


 





 


Calcium Level


 9.3 mg/dL


(8.5-10.1) 


 


 





 


Magnesium Level


 1.9 mg/dL


(1.8-2.4) 


 


 





 


Total Bilirubin


 0.6 mg/dL


(0.2-1.0) 


 


 





 


Aspartate Amino Transf


(AST/SGOT) 17 U/L (15-37) 


 


 


 





 


Alanine Aminotransferase


(ALT/SGPT) 18 U/L (14-59) 


 


 


 





 


Alkaline Phosphatase


 92 U/L


() 


 


 





 


Total Protein


 4.4 g/dL


(6.4-8.2) 


 


 





 


Albumin


 1.2 g/dL


(3.4-5.0) 


 


 





 


Albumin/Globulin Ratio 0.4 (1.0-1.7)    


 


Thyroid Stimulating Hormone


(TSH) 1.237 uIU/mL


(0.358-3.74) 


 


 





 


Glucose (Fingerstick)


 


 102 mg/dL


(70-99) 185 mg/dL


(70-99) 





 


Ionized Calcium


 


 


 


 1.50 mmol/L


(1.13-1.32)


 


Iron Level


 


 


 


 8 ug/dL


()


 


Total Iron Binding Capacity


 


 


 


 56 ug/dL


(250-450)


 


Iron Saturation    14 % (15-34) 


 


Test


 6/19/20


23:40 6/20/20


05:50 6/20/20


05:55 





 


Glucose (Fingerstick)


 147 mg/dL


(70-99) 


 131 mg/dL


(70-99) 





 


White Blood Count


 


 7.7 x10^3/uL


(4.0-11.0) 


 





 


Red Blood Count


 


 2.76 x10^6/uL


(3.50-5.40) 


 





 


Hemoglobin


 


 7.8 g/dL


(12.0-15.5) 


 





 


Hematocrit


 


 23.6 %


(36.0-47.0) 


 





 


Mean Corpuscular Volume  85 fL ()   


 


Mean Corpuscular Hemoglobin  28 pg (25-35)   


 


Mean Corpuscular Hemoglobin


Concent 


 33 g/dL


(31-37) 


 





 


Red Cell Distribution Width


 


 18.2 %


(11.5-14.5) 


 





 


Platelet Count


 


 479 x10^3/uL


(140-400) 


 





 


Neutrophils (%) (Auto)  74 % (31-73)   


 


Lymphocytes (%) (Auto)  16 % (24-48)   


 


Monocytes (%) (Auto)  9 % (0-9)   


 


Eosinophils (%) (Auto)  2 % (0-3)   


 


Basophils (%) (Auto)  0 % (0-3)   


 


Neutrophils # (Auto)


 


 5.6 x10^3/uL


(1.8-7.7) 


 





 


Lymphocytes # (Auto)


 


 1.2 x10^3/uL


(1.0-4.8) 


 





 


Monocytes # (Auto)


 


 0.7 x10^3/uL


(0.0-1.1) 


 





 


Eosinophils # (Auto)


 


 0.1 x10^3/uL


(0.0-0.7) 


 





 


Basophils # (Auto)


 


 0.0 x10^3/uL


(0.0-0.2) 


 





 


Sodium Level


 


 137 mmol/L


(136-145) 


 





 


Potassium Level


 


 3.8 mmol/L


(3.5-5.1) 


 





 


Chloride Level


 


 103 mmol/L


() 


 





 


Carbon Dioxide Level


 


 28 mmol/L


(21-32) 


 





 


Anion Gap  6 (6-14)   


 


Blood Urea Nitrogen


 


 17 mg/dL


(7-20) 


 





 


Creatinine


 


 0.6 mg/dL


(0.6-1.0) 


 





 


Estimated GFR


(Cockcroft-Gault) 


 106.3 


 


 





 


BUN/Creatinine Ratio  28 (6-20)   


 


Glucose Level


 


 134 mg/dL


(70-99) 


 





 


Calcium Level


 


 9.4 mg/dL


(8.5-10.1) 


 





 


Total Bilirubin


 


 0.4 mg/dL


(0.2-1.0) 


 





 


Aspartate Amino Transf


(AST/SGOT) 


 17 U/L (15-37) 


 


 





 


Alanine Aminotransferase


(ALT/SGPT) 


 18 U/L (14-59) 


 


 





 


Alkaline Phosphatase


 


 101 U/L


() 


 





 


Total Protein


 


 4.4 g/dL


(6.4-8.2) 


 





 


Albumin


 


 1.1 g/dL


(3.4-5.0) 


 





 


Albumin/Globulin Ratio  0.3 (1.0-1.7)   








Laboratory Tests








Test


 6/19/20


12:25 6/19/20


14:43 6/19/20


23:40 6/20/20


05:50


 


Glucose (Fingerstick)


 185 mg/dL


(70-99) 


 147 mg/dL


(70-99) 





 


Ionized Calcium


 


 1.50 mmol/L


(1.13-1.32) 


 





 


Iron Level


 


 8 ug/dL


() 


 





 


Total Iron Binding Capacity


 


 56 ug/dL


(250-450) 


 





 


Iron Saturation  14 % (15-34)   


 


White Blood Count


 


 


 


 7.7 x10^3/uL


(4.0-11.0)


 


Red Blood Count


 


 


 


 2.76 x10^6/uL


(3.50-5.40)


 


Hemoglobin


 


 


 


 7.8 g/dL


(12.0-15.5)


 


Hematocrit


 


 


 


 23.6 %


(36.0-47.0)


 


Mean Corpuscular Volume    85 fL () 


 


Mean Corpuscular Hemoglobin    28 pg (25-35) 


 


Mean Corpuscular Hemoglobin


Concent 


 


 


 33 g/dL


(31-37)


 


Red Cell Distribution Width


 


 


 


 18.2 %


(11.5-14.5)


 


Platelet Count


 


 


 


 479 x10^3/uL


(140-400)


 


Neutrophils (%) (Auto)    74 % (31-73) 


 


Lymphocytes (%) (Auto)    16 % (24-48) 


 


Monocytes (%) (Auto)    9 % (0-9) 


 


Eosinophils (%) (Auto)    2 % (0-3) 


 


Basophils (%) (Auto)    0 % (0-3) 


 


Neutrophils # (Auto)


 


 


 


 5.6 x10^3/uL


(1.8-7.7)


 


Lymphocytes # (Auto)


 


 


 


 1.2 x10^3/uL


(1.0-4.8)


 


Monocytes # (Auto)


 


 


 


 0.7 x10^3/uL


(0.0-1.1)


 


Eosinophils # (Auto)


 


 


 


 0.1 x10^3/uL


(0.0-0.7)


 


Basophils # (Auto)


 


 


 


 0.0 x10^3/uL


(0.0-0.2)


 


Sodium Level


 


 


 


 137 mmol/L


(136-145)


 


Potassium Level


 


 


 


 3.8 mmol/L


(3.5-5.1)


 


Chloride Level


 


 


 


 103 mmol/L


()


 


Carbon Dioxide Level


 


 


 


 28 mmol/L


(21-32)


 


Anion Gap    6 (6-14) 


 


Blood Urea Nitrogen


 


 


 


 17 mg/dL


(7-20)


 


Creatinine


 


 


 


 0.6 mg/dL


(0.6-1.0)


 


Estimated GFR


(Cockcroft-Gault) 


 


 


 106.3 





 


BUN/Creatinine Ratio    28 (6-20) 


 


Glucose Level


 


 


 


 134 mg/dL


(70-99)


 


Calcium Level


 


 


 


 9.4 mg/dL


(8.5-10.1)


 


Total Bilirubin


 


 


 


 0.4 mg/dL


(0.2-1.0)


 


Aspartate Amino Transf


(AST/SGOT) 


 


 


 17 U/L (15-37) 





 


Alanine Aminotransferase


(ALT/SGPT) 


 


 


 18 U/L (14-59) 





 


Alkaline Phosphatase


 


 


 


 101 U/L


()


 


Total Protein


 


 


 


 4.4 g/dL


(6.4-8.2)


 


Albumin


 


 


 


 1.1 g/dL


(3.4-5.0)


 


Albumin/Globulin Ratio    0.3 (1.0-1.7) 


 


Test


 6/20/20


05:55 


 


 





 


Glucose (Fingerstick)


 131 mg/dL


(70-99) 


 


 











Medications





Active Scripts








 Medications  Dose


 Route/Sig


 Max Daily Dose Days Date Category


 


 Bisoprolol


 Fumarate 5 Mg


 Tablet  10 Mg


 PO DAILY


   3/16/20 Reported








Comments


CXR 


 


improved right effusion/ atelectasis














ct abdomen /pelvis 6/6


1. Removal of the percutaneous pigtail drainage catheters since the prior 


exam. Sequela of pancreatitis with extensive pseudocysts again 


demonstrated, the right-sided collections are slightly larger since the 


prior exam, the left-sided collections are stable. See above.


2. Moderate to large left pleural effusion with atelectasis and collapse 


of most of the left lower lobe, stable. Small right pleural effusion is 


stable.


3. Gallstone.





ct chest 6/15 reviewed





Impression


.


IMPRESSION:


1.  Acute hypoxemic respiratory failure secondary to ARDS status post trach, 

developed anemia 6/7, blood drainage from RLQ abdomen drain site, and 

surrounding firmness  / developed septic shock 6/7 from abdomen source, required

levo 6/7


s/p 3 new drains 6/7 with brown color drainage, back on vent 6/13, on/off vent 


2.  Gallstone pancreatitis, now with ongoing bleeding from prior drain. Anemic. 

s/p Tx multiple units over several days


3.  septic shock/sepsis, recurrent 6/7, source abdomen. , off levo now, 


4.  Acute kidney injury-, Off HD--renal function decling. suspect JED on CKD due

to hypotension , improved now


5.  Acute gallstone pancreatitis.


6.  Hypoalbuminemia.


7.  Moderate persistent effusions, s/p left thora  5/12, reaccumulation of left 

effusion. O2 requirement not changed. 


8.  Fever- ,hypotension. suspect recurrent sepsis/ likely pancreatic source.  

Per ID, per surgery--


9.  Chronic anemia-- ongoing / s/p PRBC


10. Covid 19 testing negative


11. Moderate to large ascites-S/P paracentisis


12.S/P paracentisis with 4 liters removed on 4/15/20


13. S/P IR drain placement on 5/8/2020, removal, re inserted 6/7


14. Depression/Anxiety 


15. Increase effusion, ? loculated/ s/p chest tube.. drainage slowing down. 190 

cc /24 hr





Plan


.


1. TS as tolerated  titrate fio2 to keep sat 94%, 


2. Follow all cultures/ PSA from pelvic drains. Abx per ID/  thoracentesis 

result transudate, await c/s,


3. Follow surgery recs-- Acute pancreatitis with persistent necrosis ---- 4/27 

status post ROBERT drain placement + C paropsilosis; 5/6 + yeast & high amylase; s/p

additional drains on 5/8, removal of drains and now increase pseudocyst and 

increase fluid collection. likely infected fluid/ sepsis. on BS abx.follow 

surgery rec, planning surgical intervention next few weeks


4. Follow ID recs for ABX----


5. Follow nephrology recs -----


6. Continue TPN 


7. monitor HGB,  4 units since 6/6--monitor HGB transfuse if less than 8.5 


8 s/p  thoracentesis, 5/12, 3 litres removed, 6/15. 190 cc over the past 24 hrs,

increase suction to -40


9. Pt remains critically ill from severe necrotic pancreatis. Even with surgery 

prognosis grim. Surgery informed family.


10. lasix/albumin prn





     


DVT/GI PPX: protonix--- holding lovenox 2/2 anemia


PT/OT


D/W RN, rt and


d/w MAN HINTON MD               Jun 20, 2020 08:01

## 2020-06-21 VITALS — SYSTOLIC BLOOD PRESSURE: 102 MMHG | DIASTOLIC BLOOD PRESSURE: 51 MMHG

## 2020-06-21 VITALS — SYSTOLIC BLOOD PRESSURE: 99 MMHG | DIASTOLIC BLOOD PRESSURE: 61 MMHG

## 2020-06-21 VITALS — SYSTOLIC BLOOD PRESSURE: 91 MMHG | DIASTOLIC BLOOD PRESSURE: 50 MMHG

## 2020-06-21 VITALS — DIASTOLIC BLOOD PRESSURE: 47 MMHG | SYSTOLIC BLOOD PRESSURE: 91 MMHG

## 2020-06-21 VITALS — SYSTOLIC BLOOD PRESSURE: 138 MMHG | DIASTOLIC BLOOD PRESSURE: 68 MMHG

## 2020-06-21 VITALS — DIASTOLIC BLOOD PRESSURE: 75 MMHG | SYSTOLIC BLOOD PRESSURE: 118 MMHG

## 2020-06-21 VITALS — DIASTOLIC BLOOD PRESSURE: 57 MMHG | SYSTOLIC BLOOD PRESSURE: 95 MMHG

## 2020-06-21 VITALS — SYSTOLIC BLOOD PRESSURE: 95 MMHG | DIASTOLIC BLOOD PRESSURE: 45 MMHG

## 2020-06-21 VITALS — DIASTOLIC BLOOD PRESSURE: 63 MMHG | SYSTOLIC BLOOD PRESSURE: 87 MMHG

## 2020-06-21 VITALS — DIASTOLIC BLOOD PRESSURE: 48 MMHG | SYSTOLIC BLOOD PRESSURE: 91 MMHG

## 2020-06-21 VITALS — SYSTOLIC BLOOD PRESSURE: 93 MMHG | DIASTOLIC BLOOD PRESSURE: 50 MMHG

## 2020-06-21 VITALS — SYSTOLIC BLOOD PRESSURE: 101 MMHG | DIASTOLIC BLOOD PRESSURE: 63 MMHG

## 2020-06-21 VITALS — SYSTOLIC BLOOD PRESSURE: 86 MMHG | DIASTOLIC BLOOD PRESSURE: 44 MMHG

## 2020-06-21 VITALS — DIASTOLIC BLOOD PRESSURE: 61 MMHG | SYSTOLIC BLOOD PRESSURE: 100 MMHG

## 2020-06-21 VITALS — DIASTOLIC BLOOD PRESSURE: 78 MMHG | SYSTOLIC BLOOD PRESSURE: 96 MMHG

## 2020-06-21 VITALS — DIASTOLIC BLOOD PRESSURE: 55 MMHG | SYSTOLIC BLOOD PRESSURE: 97 MMHG

## 2020-06-21 VITALS — SYSTOLIC BLOOD PRESSURE: 82 MMHG | DIASTOLIC BLOOD PRESSURE: 42 MMHG

## 2020-06-21 VITALS — SYSTOLIC BLOOD PRESSURE: 114 MMHG | DIASTOLIC BLOOD PRESSURE: 72 MMHG

## 2020-06-21 LAB
ALBUMIN SERPL-MCNC: 1 G/DL (ref 3.4–5)
ALBUMIN/GLOB SERPL: 0.3 {RATIO} (ref 1–1.7)
ALP SERPL-CCNC: 113 U/L (ref 46–116)
ALT SERPL-CCNC: 15 U/L (ref 14–59)
ANION GAP SERPL CALC-SCNC: 5 MMOL/L (ref 6–14)
AST SERPL-CCNC: 19 U/L (ref 15–37)
BASOPHILS # BLD AUTO: 0 X10^3/UL (ref 0–0.2)
BASOPHILS NFR BLD: 0 % (ref 0–3)
BILIRUB SERPL-MCNC: 0.4 MG/DL (ref 0.2–1)
BUN SERPL-MCNC: 15 MG/DL (ref 7–20)
BUN/CREAT SERPL: 25 (ref 6–20)
CALCIUM SERPL-MCNC: 9.8 MG/DL (ref 8.5–10.1)
CHLORIDE SERPL-SCNC: 102 MMOL/L (ref 98–107)
CO2 SERPL-SCNC: 29 MMOL/L (ref 21–32)
CREAT SERPL-MCNC: 0.6 MG/DL (ref 0.6–1)
EOSINOPHIL NFR BLD: 0.1 X10^3/UL (ref 0–0.7)
EOSINOPHIL NFR BLD: 1 % (ref 0–3)
ERYTHROCYTE [DISTWIDTH] IN BLOOD BY AUTOMATED COUNT: 17.4 % (ref 11.5–14.5)
GFR SERPLBLD BASED ON 1.73 SQ M-ARVRAT: 106.3 ML/MIN
GLOBULIN SER-MCNC: 3.3 G/DL (ref 2.2–3.8)
GLUCOSE SERPL-MCNC: 133 MG/DL (ref 70–99)
HCT VFR BLD CALC: 26.3 % (ref 36–47)
HGB BLD-MCNC: 9 G/DL (ref 12–15.5)
LYMPHOCYTES # BLD: 1.8 X10^3/UL (ref 1–4.8)
LYMPHOCYTES NFR BLD AUTO: 17 % (ref 24–48)
MCH RBC QN AUTO: 29 PG (ref 25–35)
MCHC RBC AUTO-ENTMCNC: 34 G/DL (ref 31–37)
MCV RBC AUTO: 85 FL (ref 79–100)
MONO #: 0.8 X10^3/UL (ref 0–1.1)
MONOCYTES NFR BLD: 7 % (ref 0–9)
NEUT #: 7.7 X10^3/UL (ref 1.8–7.7)
NEUTROPHILS NFR BLD AUTO: 74 % (ref 31–73)
PLATELET # BLD AUTO: 448 X10^3/UL (ref 140–400)
POTASSIUM SERPL-SCNC: 4 MMOL/L (ref 3.5–5.1)
PROT SERPL-MCNC: 4.3 G/DL (ref 6.4–8.2)
RBC # BLD AUTO: 3.1 X10^6/UL (ref 3.5–5.4)
SODIUM SERPL-SCNC: 136 MMOL/L (ref 136–145)
WBC # BLD AUTO: 10.3 X10^3/UL (ref 4–11)

## 2020-06-21 RX ADMIN — INSULIN LISPRO SCH UNITS: 100 INJECTION, SOLUTION INTRAVENOUS; SUBCUTANEOUS at 18:00

## 2020-06-21 RX ADMIN — IPRATROPIUM BROMIDE AND ALBUTEROL SULFATE SCH ML: .5; 3 SOLUTION RESPIRATORY (INHALATION) at 07:49

## 2020-06-21 RX ADMIN — IPRATROPIUM BROMIDE AND ALBUTEROL SULFATE SCH ML: .5; 3 SOLUTION RESPIRATORY (INHALATION) at 04:00

## 2020-06-21 RX ADMIN — PANTOPRAZOLE SODIUM SCH MG: 40 INJECTION, POWDER, FOR SOLUTION INTRAVENOUS at 08:49

## 2020-06-21 RX ADMIN — DILTIAZEM HYDROCHLORIDE SCH MLS/HR: 5 INJECTION INTRAVENOUS at 11:45

## 2020-06-21 RX ADMIN — ONDANSETRON PRN MG: 2 INJECTION INTRAMUSCULAR; INTRAVENOUS at 08:51

## 2020-06-21 RX ADMIN — IPRATROPIUM BROMIDE AND ALBUTEROL SULFATE SCH ML: .5; 3 SOLUTION RESPIRATORY (INHALATION) at 16:00

## 2020-06-21 RX ADMIN — CIPROFLOXACIN SCH MLS/HR: 2 INJECTION, SOLUTION INTRAVENOUS at 08:50

## 2020-06-21 RX ADMIN — IPRATROPIUM BROMIDE AND ALBUTEROL SULFATE SCH ML: .5; 3 SOLUTION RESPIRATORY (INHALATION) at 11:32

## 2020-06-21 RX ADMIN — DILTIAZEM HYDROCHLORIDE SCH MLS/HR: 5 INJECTION INTRAVENOUS at 08:51

## 2020-06-21 RX ADMIN — IPRATROPIUM BROMIDE AND ALBUTEROL SULFATE SCH ML: .5; 3 SOLUTION RESPIRATORY (INHALATION) at 19:55

## 2020-06-21 RX ADMIN — INSULIN LISPRO SCH UNITS: 100 INJECTION, SOLUTION INTRAVENOUS; SUBCUTANEOUS at 05:43

## 2020-06-21 RX ADMIN — IPRATROPIUM BROMIDE AND ALBUTEROL SULFATE SCH ML: .5; 3 SOLUTION RESPIRATORY (INHALATION) at 23:45

## 2020-06-21 RX ADMIN — IPRATROPIUM BROMIDE AND ALBUTEROL SULFATE SCH ML: .5; 3 SOLUTION RESPIRATORY (INHALATION) at 00:00

## 2020-06-21 RX ADMIN — CIPROFLOXACIN SCH MLS/HR: 2 INJECTION, SOLUTION INTRAVENOUS at 22:21

## 2020-06-21 RX ADMIN — INSULIN LISPRO SCH UNITS: 100 INJECTION, SOLUTION INTRAVENOUS; SUBCUTANEOUS at 12:02

## 2020-06-21 RX ADMIN — DILTIAZEM HYDROCHLORIDE SCH MLS/HR: 5 INJECTION INTRAVENOUS at 22:20

## 2020-06-21 RX ADMIN — Medication PRN EACH: at 11:19

## 2020-06-21 NOTE — PDOC
Infectious Disease Note


Subjective


Subjective


s/p PRBCs transfusion 


Low-grade fever, Tmax 100.3


Trach shield, FiO2 33% and 5L 


No fever N/V since yesterday morning 


TPN





ROS


ROS


unobtainable





Vital Sign


Vital Signs





Vital Signs








  Date Time  Temp Pulse Resp B/P (MAP) Pulse Ox O2 Delivery O2 Flow Rate FiO2


 


6/21/20 06:00  110 20 93/50 (64) 99 Ventilator  


 


6/21/20 04:00 100.3       





 100.3       


 


6/20/20 12:34       9.0 











Physical Exam


PHYSICAL EXAM


GENERAL: Propped up in bed, resting quietly 


HEENT: NGT in place. 


NECK:  Tracheostomy 


LUNGS: Diminished aeration bases, no accessory muscle use - CT on left with 

clear fluid


HEART:  S1, S2,regular 


ABDOMEN: Mild distention, bowel sounds present, soft, grimaces to palpation, 

drains x 3


: [place] ( 6/7)


EXTREMITIES: + edema BLE


SKIN: no signs of gen rash 


LUE-PICC (5/29) without signs of complications





Labs


Lab





Laboratory Tests








Test


 6/20/20


13:03 6/20/20


23:59 6/21/20


05:15


 


Glucose (Fingerstick)


 170 mg/dL


(70-99) 132 mg/dL


(70-99) 





 


White Blood Count


 


 


 10.3 x10^3/uL


(4.0-11.0)


 


Red Blood Count


 


 


 3.10 x10^6/uL


(3.50-5.40)


 


Hemoglobin


 


 


 9.0 g/dL


(12.0-15.5)


 


Hematocrit


 


 


 26.3 %


(36.0-47.0)


 


Mean Corpuscular Volume   85 fL () 


 


Mean Corpuscular Hemoglobin   29 pg (25-35) 


 


Mean Corpuscular Hemoglobin


Concent 


 


 34 g/dL


(31-37)


 


Red Cell Distribution Width


 


 


 17.4 %


(11.5-14.5)


 


Platelet Count


 


 


 448 x10^3/uL


(140-400)


 


Neutrophils (%) (Auto)   74 % (31-73) 


 


Lymphocytes (%) (Auto)   17 % (24-48) 


 


Monocytes (%) (Auto)   7 % (0-9) 


 


Eosinophils (%) (Auto)   1 % (0-3) 


 


Basophils (%) (Auto)   0 % (0-3) 


 


Neutrophils # (Auto)


 


 


 7.7 x10^3/uL


(1.8-7.7)


 


Lymphocytes # (Auto)


 


 


 1.8 x10^3/uL


(1.0-4.8)


 


Monocytes # (Auto)


 


 


 0.8 x10^3/uL


(0.0-1.1)


 


Eosinophils # (Auto)


 


 


 0.1 x10^3/uL


(0.0-0.7)


 


Basophils # (Auto)


 


 


 0.0 x10^3/uL


(0.0-0.2)


 


Sodium Level


 


 


 136 mmol/L


(136-145)


 


Potassium Level


 


 


 4.0 mmol/L


(3.5-5.1)


 


Chloride Level


 


 


 102 mmol/L


()


 


Carbon Dioxide Level


 


 


 29 mmol/L


(21-32)


 


Anion Gap   5 (6-14) 


 


Blood Urea Nitrogen


 


 


 15 mg/dL


(7-20)


 


Creatinine


 


 


 0.6 mg/dL


(0.6-1.0)


 


Estimated GFR


(Cockcroft-Gault) 


 


 106.3 





 


BUN/Creatinine Ratio   25 (6-20) 


 


Glucose Level


 


 


 133 mg/dL


(70-99)


 


Calcium Level


 


 


 9.8 mg/dL


(8.5-10.1)


 


Total Bilirubin


 


 


 0.4 mg/dL


(0.2-1.0)


 


Aspartate Amino Transf


(AST/SGOT) 


 


 19 U/L (15-37) 





 


Alanine Aminotransferase


(ALT/SGPT) 


 


 15 U/L (14-59) 





 


Alkaline Phosphatase


 


 


 113 U/L


()


 


Total Protein


 


 


 4.3 g/dL


(6.4-8.2)


 


Albumin


 


 


 1.0 g/dL


(3.4-5.0)


 


Albumin/Globulin Ratio   0.3 (1.0-1.7) 








Micro


Pleural fluid, 6/15


 ANAEROBIC-AEROBIC CULTURE  Preliminary  


        Preliminary





        No Growth on 06/16/20 at 1117


        No Growth on 06/17/20 at 1038








6/18.  BLOOD CULTURE  Preliminary  


        NO GROWTH AFTER 2 DAYS





Objective


Assessment


Patient with prolonged hospitalization


Multiple medical problems


Multiple surgical procedures





Vomiting - better


Fever 6/18 WBC nml - Added cipro 6/18 with h/p res PSA in sputum maybe sec to 

ileus given her vomiting. Art- line placed 6/7 - d/c'd 6/18


Respiratory failure/trach/vent: sputum 6/13  + PSAE (I merrem) 


Pleural effusions s/p left thoracentesis, 5/12. no culture. s/p left chest tube,

6/15 no growth


Gallstone pancreatitis with necrosis. 


   -CT A/P 6/6 showed multiple pseudocysts, slight larger on the right. s/p 

drains x 3, 6/7.  + PSAE (MDRO-R Cefepime, Zosyn ANSON < 64) and yeast, 


   -s/p drain 4/27. C. parapsilosis. s/p drain 5/6 + yeast & high amylase; s/p 

additional drain on 5/8. Drains removed. 


Ascites s/p paracentesis 4/15 & 5/6. C. parapsilosis 


JED. off HD. 


Anemia s/p PRBCs 6/20 


Prediabetes


HTN





Plan


Plan of Care


Continue cipro 6/18. sputum from 6/13 - growing PSA - may be colonization (I to 

Meropenem), clinically stable from resp standpoint


Continue meropenem, has MDRO PSAE June 7 from abd


Continue micafungin June 7


Follow-up cultures fluid for yeast ID


Monitor labs/temp


General surgery following


Monitor drain output


Maintain aspiration precaution


Supportive care


Contact islation for CRE/MDRO





ST SID micro [phone]








Critically ill


Attending Co-Sign


The patient was seen and interviewed as well as examined at the bedside. The 

chart was reviewed. The case was discussed. Agree with the plan of care.











HAVEN WINTERS        Jun 21, 2020 08:06


LINN FRANZ MD               Jun 21, 2020 10:20

## 2020-06-21 NOTE — NUR
Pharmacy TPN Dosing Note



S: JESENIA BEAN is a 49 year old F Currently receiving Central Continuous TPN started 
03/18/20



B:Pertinent PMH: 

Necrotizing pancreatitis

Height: 5 feet, 8 inches

Weight: 91.0 kg



Current diet: NPO 



LABS:

Sodium:    136 

Potassium: 4.0 

Chloride:  102 

Calcium:   9.8 

Corrected Calcium: 12.20 

Magnesium: 1.9 

CO2:       29 

SCr:       0.6 

Glucose:   134 

Albumin:   1.0 

AST:       17 

ALT:       18 



TPN FORMULA:

TPN TYPE:  Central Continuous

AMINO ACIDS:         80 gm

DEXTROSE:            250 gm

LIPIDS:              20 gm

SODIUM CHLORIDE:     - mEq

SODIUM ACETATE:      - mEq

SODIUM PHOSPHATE:    - mmol

POTASSIUM CHLORIDE:  - mEq

POTASSIUM ACETATE:   60 mEq

POTASSIUM PHOSPHATE: - mmol

MAGNESIUM:           14 mEq

CALCIUM:             - mEq

INSULIN:             15 units

MULTIPLE VITAMIN:    10 ml

TRACE ELEMENTS:      1 ml(s)



TPN PLAN:  

Labs stable. Calcium increasing; no calcium in TPN. No change to formula.

No labs tomorrow due to stability. Trig tuesday.





R: Continue TPN AS ABOVE.

Will monitor electrolytes, glucose, and tolerance to TPN.



 CANDACE BELTRE [facility], 06/21/20 1127

## 2020-06-21 NOTE — NUR
Pharmacy TPN Dosing Note



S: JESENIA BEAN is a 49 year old F Currently receiving Central Continuous TPN started 
03/18/20



B:Pertinent PMH: 

Necrotizing pancreatitis

Height: 5 feet, 8 inches

Weight: 91.0 kg



Current diet: NPO 



LABS:

Sodium:    136 

Potassium: 4.0 

Chloride:  102 

Calcium:   9.8 

Corrected Calcium: 12.20 

Magnesium: 1.9 

CO2:       29 

SCr:       0.6 

Glucose:   134 

Albumin:   1.0 

AST:       17 

ALT:       18 



TPN FORMULA:

TPN TYPE:  Central Continuous

AMINO ACIDS:         80 gm

DEXTROSE:            250 gm

LIPIDS:              20 gm

SODIUM CHLORIDE:     - mEq

SODIUM ACETATE:      - mEq

SODIUM PHOSPHATE:    - mmol

POTASSIUM CHLORIDE:  - mEq

POTASSIUM ACETATE:   60 mEq

POTASSIUM PHOSPHATE: - mmol

MAGNESIUM:           14 mEq

CALCIUM:             - mEq

INSULIN:             15 units

MULTIPLE VITAMIN:    10 ml

TRACE ELEMENTS:      1 ml(s)



TPN PLAN:  

Labs stable. Calcium increasing; no calcium in TPN. No change to formula.

No labs tomorrow due to stability. Trig tuesday.





R: Continue TPN AS ABOVE.

Will monitor electrolytes, glucose, and tolerance to TPN.



 CANDACE BELTRE [facility], 06/21/20 1128

## 2020-06-21 NOTE — PDOC
PROGRESS NOTES


Chief Complaint


Chief Complaint


impression =================











History of Present Illness


48yo F w/ PMHx HTN, prediabetes who presented the emergency room complaints of 

abdominal pain. Patient described off and on 3 days. She states is constant, 

described as a squeezing sensation in a band-like distribution. + nausea, 

vomiting.  She denies any fever or diarrhea.  Patient denies any abdominal 

surgical procedures.  She stateed is worse with movements, car ride.  Pain 

initially was upper abdomen however now pretty much generalized.  Last bowel 

movement was 3/15/2020. Nothing makes her pain better.  Patient denies any 

shortness of breath.  She does state the pain moves into her chest.  Denies any 

headache or visual changes.


Lipase 67009, , , Bilirubin 1.4.


CT abdomen confirms pancreatic inflammation, peripancreatic fluid and 

inflammatory changes around the pancreas consistent with pancreatitis. 

Cholelithiasis and 1.4cm uterine fibroid as well as possible left salpingitis. 

Admitted for further care


Gallstone pancreatitis with necrosis. 


   -CT A/P 6/6 showed multiple pseudocysts, slight larger on the right. s/p 

drains x 3, 6/7.  + PSAE (MDRO-R Cefepime, Zosyn ANSON < 64) and yeast, 


   -s/p drain 4/27. C. parapsilosis. s/p drain 5/6 + yeast & high amylase; s/p 

additional drain on 5/8. Drains removed. 


Ascites s/p paracentesis 4/15 & 5/6. C. parapsilosis 


JED. off HD. 





impression ============================











Acute hypoxic Respiratory failure required  mechanical ventilation


Tracheostomy


bilateral pleural effusions/pulm edema s/p Throacentesis on 6/15/2020


Severe Acute gallstone pancreatitis (not a surgical candidate at this time) with

necrosis


Acute kidney failure now requiring dialysis


Gallstones (Calculus of gallbladder with acute cholecystitis without 

obstruction)


HTN 


Intractable pain


Intractable nausea


Covid 19 negative. 


Acute on chronic anemia 


EEG: No seizure activityFever  - better currently - intermittent could be from 

underlying pancreatitis blood cults 5/4 - neg so far


? Ileus with vomiting


Abd distention - U/S and CT reviewed s/p 0.4 L of opaque, debris-containing 

ascites was removed 5/6


Acute pancreatitis with persistent necrosis


Gallstone pancreatitis with necrosis. 


   -CT A/P 6/6 showed multiple pseudocysts, slight larger on the right. s/p 

drains x 3, 6/7.  + PSAE (MDRO-R Cefepime, Zosyn ANSON < 64) and yeast, 


   -s/p drain 4/27. C. parapsilosis. s/p drain 5/6 + yeast & high amylase; s/p 

additional drain on 5/8. Drains removed. 


Ascites s/p paracentesis 4/15 & 5/6. C. parapsilosis 


JED. off HD. 


A large fluid collection in the pancreatic bed has slightly decreased in size, 

described below, the pancreas itself is difficult to  visualize, which could be 

due to necrosis or obscuration of pancreatic  parenchyma from the surrounding 

fluid collection.6/15 


- 4/27 status post ROBERT drain placement + C paropsilosis. s/p additional drains 

5/8


Anemia - S/p PRBCs


Cholelithiasis with thickening of the gallbladder wall.


Leucocytosis improving


JED, hyperkalemia, Metabolic acidosis off dialysis


hypocalcemia 


Prediabetes


HTN


s/p trach


ESRD on HD


Hyperglycemia


severe protein-caloric malnutrition


Moderate to large left pleural effusion with atelectasis and collapse  of most 

of the left lower lobe, stable








6/18 FEVER 101, BLOOD CULTURE X 2 


6/19  iv cipro added


6/20  vent fio2 35% 











FEN - 





PPX - SCDs, off lovenox currently, high risk for thrombosis but in light of her 

recent anemia and need for transfusion the risks do not outweigh benefits at 

this time. will continue to evaluate on a daily basis


FULL CODE


Dispo - ICU, critically ill


BACK ON VENT 6/17  vent // no distress


iv albumin 6/19


Continue meropenem, has MDRO PSAE June 7 


Continue micafungin June 7











33 MIN CC TIME





History of Present Illness


History of Present Illness


6/8: IR placed drain on 6/7. 4u PRBC after Hb drop. Hb 8.8 today. Off Levophed 

this morning. T-max 100.3. Much more lethargic today. CXR with left sided diffus

e infiltrates.


6/9: Tachycardic overnight into the 140s. NGT clamped. On BIPAP currently. 

Drains with serosanguinous discharge. WBC 8, Tmax 99.6F.


6/10: Seen on trach shield in ICU.  Hypertensive and tachycardic. Labs stable. 

blood stained drainage from drains. Afebrile.


6/11: Seen on trach shield in ICU. She is a bit confused, drowsy, but when 

sitting up is conversational and confusion somewhat clears. She is asking for 

more pain medication. Stable drains, still very tachy.Na 147


6/12: Patient vomited overnight. Aspirated. Tried to pull her trach out, she was

told she would die without her trach, she said "I know, I just want to go home".

Hb 7.6. Afebrile, still very tachycardic. 1055ml out of right sided ROBERT drain


6/13: Overnight hypoxic, on BIPAP. CXR with left sided white out lung. 

Significant mucous plug suctioned by RT with improvement in her ABG after 2 

hours this morning. Not really active, tired, lethargic. 890ml out of drains 

past 24 hours. On vent. D/w daughter bedside.


6/14: Still on vent overnight with copious pulmonary drainage on suctioning. 

Labs stable, UOP stable 1L. Drain output still significant with 1505mL. She has 

shown her phone password [id number] and made it clear she does not want her daughters 

to access her phone at this time.


6:15: Patient quite frail, she has had such a lengthy hospital stay that she is 

certainly depressed and as documented 3 days ago is wanting to go home. Most 

likely patient is also tired of being hospitalized with an end point no where in

sight. Reassurance and encouragement provided during my visit. Plans for 

thoracentesis later in the day 


6/16: No acute events reported overnight, case discussed with nursing staff 

patient in no acute distress, complaints of the abdomimal pain during my visit, 

patient is quite depressed, reassurance has been provided, discussed with 

nursing staff at bedside, replace electrolytes 


6/17 off vent / on TS , no distress








6/20 /2020


Patient seen and examined ICU BED


critically ill 


guardedlong-term prognosis 


Sputum from 6/13 - growing PSA - may be colonization I to Meropenem add Cipro


Continue meropenem, has MDRP PSAE June 7 from abd


cont micafungin June 7


bleeding from Sx. site RLQ and firmness)


max 1003 


oncology consulted











37 MIN CC TIME








Date:  Apr 27, 2020





Pre-Op Diagnosis:


Necrotizing pancreatitis





Post-Op Diagnosis:


same





Procedure Performed:


laparoscopic exploration





Surgeon:


Frandy Flores


Asst:  Dr. Luis Santos





Anesthesia Type:


GETA plus local





Blood Loss:


50





Specimans Obtained:


cultures, debris





Findings:


1000 cc ascites suctioned off, cultures sent, diffuse debris, obliteration of 

surgical planes preventing any meaningful exploration




















 





6/3/2020


Patient seen and examined in the ICU


She remains critically ill


We have been trying to hold off on her Ativan for the past 24 hours


She is a little more awake but shaky and agitated and anxious seems depressed


Discussed with RN


Chart reviewed








6/2/2020


Patient seen and examined in the ICU


She is a little more alert today but still quite ill


Appears clammy and pale and depressed/anxious


Discussed with RN


Chart reviewed











6/1/2020


Patient seen and examined in the ICU


She appears extremely ill


She is tachypneic at 35 respirations per minute and tachycardic at 132 bpm


She is extremely encephalopathic and shaky


She appears clammy


Chart reviewed


Discussed with RN


Prognosis extremely guarded at best








5/31/2020


Patient still in ICU


Resting with no apparent distress


Chart reviewed








5/30/2020


Patient seen and examined in the ICU


She is wiping her face with a cough


Discussed with RN


Chart reviewed


We hope to get her out of the ICU later today if possible








5/29/2020


Patient seen and examined in the ICU once again


She is back on NG suction


On IV Zosyn


Has IV TPN


Sedated with Precedex but anxious still


Appears somewhat clammy and pale


Chart reviewed


Discussed with RN


She remains critically ill











 


 


BRIEF OPERATIVE NOTE


Pre-Op Diagnosis


Pancreatitis with pseudocysts, suspected infection


Post-Op Diagnosis


same


Procedure Performed


CT abdominal Drains x 3


Surgeon


Hardy


Anesthesia Type:  Conscious Sedation


Findings


3 abdominal drains, 14F, with turbid pancreatic fluid and necrotic debris in 

each.


Complications


No immediate








5/9: Patient today somewhat restless and having bilious secretions from ET tube,

imaging studies ordered, discussed with consultant. Pretty poor prognosis, 

hopefully is not a fistula, poor surgical candidate. 





5/10: Imaging with no acute events, she seems more stable today compared to 

yesterday. Encouraged as much activity as possible patient at high risk for 

severe depression.





Vitals


Vitals





Vital Signs








  Date Time  Temp Pulse Resp B/P (MAP) Pulse Ox O2 Delivery O2 Flow Rate FiO2


 


6/21/20 10:00  121 21 102/51 (68) 98 Ventilator  


 


6/21/20 08:02       5.0 


 


6/21/20 08:00 100.1       





 100.1       











Physical Exam


Physical Exam


GENERAL: Propped up in bed, resting quietly 


HEENT: NGT in place. 


NECK:  Tracheostomy 


LUNGS: Diminished aeration bases, no accessory muscle use - CT on left with 

clear fluid


HEART:  S1, S2,regular 


ABDOMEN: Mild distention, bowel sounds present, soft, grimaces to palpation, 

drains x 3


: [place] ( 6/7)


EXTREMITIES: + edema BLE


SKIN: no signs of gen rash 


LUE-PICC (5/29) without signs of complications


General:  Alert, Cooperative, No acute distress


Heart:  Regular rate (SR/ST), Other (distant heart sounds)


Lungs:  Other (diminshed in bases, Rhonci in LLL)


Abdomen:  Soft, Other (NGT in place, anasarcic)


Extremities:  No cyanosis, Other (3+ bilateral LE pitting edema)


Skin:  No rashes, No significant lesion





Labs


LABS





Laboratory Tests








Test


 6/20/20


13:03 6/20/20


23:59 6/21/20


05:15


 


Glucose (Fingerstick)


 170 mg/dL


(70-99) 132 mg/dL


(70-99) 





 


White Blood Count


 


 


 10.3 x10^3/uL


(4.0-11.0)


 


Red Blood Count


 


 


 3.10 x10^6/uL


(3.50-5.40)


 


Hemoglobin


 


 


 9.0 g/dL


(12.0-15.5)


 


Hematocrit


 


 


 26.3 %


(36.0-47.0)


 


Mean Corpuscular Volume   85 fL () 


 


Mean Corpuscular Hemoglobin   29 pg (25-35) 


 


Mean Corpuscular Hemoglobin


Concent 


 


 34 g/dL


(31-37)


 


Red Cell Distribution Width


 


 


 17.4 %


(11.5-14.5)


 


Platelet Count


 


 


 448 x10^3/uL


(140-400)


 


Neutrophils (%) (Auto)   74 % (31-73) 


 


Lymphocytes (%) (Auto)   17 % (24-48) 


 


Monocytes (%) (Auto)   7 % (0-9) 


 


Eosinophils (%) (Auto)   1 % (0-3) 


 


Basophils (%) (Auto)   0 % (0-3) 


 


Neutrophils # (Auto)


 


 


 7.7 x10^3/uL


(1.8-7.7)


 


Lymphocytes # (Auto)


 


 


 1.8 x10^3/uL


(1.0-4.8)


 


Monocytes # (Auto)


 


 


 0.8 x10^3/uL


(0.0-1.1)


 


Eosinophils # (Auto)


 


 


 0.1 x10^3/uL


(0.0-0.7)


 


Basophils # (Auto)


 


 


 0.0 x10^3/uL


(0.0-0.2)


 


Sodium Level


 


 


 136 mmol/L


(136-145)


 


Potassium Level


 


 


 4.0 mmol/L


(3.5-5.1)


 


Chloride Level


 


 


 102 mmol/L


()


 


Carbon Dioxide Level


 


 


 29 mmol/L


(21-32)


 


Anion Gap   5 (6-14) 


 


Blood Urea Nitrogen


 


 


 15 mg/dL


(7-20)


 


Creatinine


 


 


 0.6 mg/dL


(0.6-1.0)


 


Estimated GFR


(Cockcroft-Gault) 


 


 106.3 





 


BUN/Creatinine Ratio   25 (6-20) 


 


Glucose Level


 


 


 133 mg/dL


(70-99)


 


Calcium Level


 


 


 9.8 mg/dL


(8.5-10.1)


 


Total Bilirubin


 


 


 0.4 mg/dL


(0.2-1.0)


 


Aspartate Amino Transf


(AST/SGOT) 


 


 19 U/L (15-37) 





 


Alanine Aminotransferase


(ALT/SGPT) 


 


 15 U/L (14-59) 





 


Alkaline Phosphatase


 


 


 113 U/L


()


 


Total Protein


 


 


 4.3 g/dL


(6.4-8.2)


 


Albumin


 


 


 1.0 g/dL


(3.4-5.0)


 


Albumin/Globulin Ratio   0.3 (1.0-1.7) 











Assessment and Plan


Assessmemt and Plan


Problems


Medical Problems:


(1) Acute pancreatitis


Status: Acute  





(2) Cholelithiasis


Status: Acute  











Comment


Review of Relevant


I have reviewed the following items josy (where applicable) has been applied.


Labs





Laboratory Tests








Test


 6/19/20


12:25 6/19/20


14:43 6/19/20


23:40 6/20/20


05:50


 


Glucose (Fingerstick)


 185 mg/dL


(70-99) 


 147 mg/dL


(70-99) 





 


Ionized Calcium


 


 1.50 mmol/L


(1.13-1.32) 


 





 


Iron Level


 


 8 ug/dL


() 


 





 


Total Iron Binding Capacity


 


 56 ug/dL


(250-450) 


 





 


Iron Saturation  14 % (15-34)   


 


White Blood Count


 


 


 


 7.7 x10^3/uL


(4.0-11.0)


 


Red Blood Count


 


 


 


 2.76 x10^6/uL


(3.50-5.40)


 


Hemoglobin


 


 


 


 7.8 g/dL


(12.0-15.5)


 


Hematocrit


 


 


 


 23.6 %


(36.0-47.0)


 


Mean Corpuscular Volume    85 fL () 


 


Mean Corpuscular Hemoglobin    28 pg (25-35) 


 


Mean Corpuscular Hemoglobin


Concent 


 


 


 33 g/dL


(31-37)


 


Red Cell Distribution Width


 


 


 


 18.2 %


(11.5-14.5)


 


Platelet Count


 


 


 


 479 x10^3/uL


(140-400)


 


Neutrophils (%) (Auto)    74 % (31-73) 


 


Lymphocytes (%) (Auto)    16 % (24-48) 


 


Monocytes (%) (Auto)    9 % (0-9) 


 


Eosinophils (%) (Auto)    2 % (0-3) 


 


Basophils (%) (Auto)    0 % (0-3) 


 


Neutrophils # (Auto)


 


 


 


 5.6 x10^3/uL


(1.8-7.7)


 


Lymphocytes # (Auto)


 


 


 


 1.2 x10^3/uL


(1.0-4.8)


 


Monocytes # (Auto)


 


 


 


 0.7 x10^3/uL


(0.0-1.1)


 


Eosinophils # (Auto)


 


 


 


 0.1 x10^3/uL


(0.0-0.7)


 


Basophils # (Auto)


 


 


 


 0.0 x10^3/uL


(0.0-0.2)


 


Sodium Level


 


 


 


 137 mmol/L


(136-145)


 


Potassium Level


 


 


 


 3.8 mmol/L


(3.5-5.1)


 


Chloride Level


 


 


 


 103 mmol/L


()


 


Carbon Dioxide Level


 


 


 


 28 mmol/L


(21-32)


 


Anion Gap    6 (6-14) 


 


Blood Urea Nitrogen


 


 


 


 17 mg/dL


(7-20)


 


Creatinine


 


 


 


 0.6 mg/dL


(0.6-1.0)


 


Estimated GFR


(Cockcroft-Gault) 


 


 


 106.3 





 


BUN/Creatinine Ratio    28 (6-20) 


 


Glucose Level


 


 


 


 134 mg/dL


(70-99)


 


Calcium Level


 


 


 


 9.4 mg/dL


(8.5-10.1)


 


Total Bilirubin


 


 


 


 0.4 mg/dL


(0.2-1.0)


 


Aspartate Amino Transf


(AST/SGOT) 


 


 


 17 U/L (15-37) 





 


Alanine Aminotransferase


(ALT/SGPT) 


 


 


 18 U/L (14-59) 





 


Alkaline Phosphatase


 


 


 


 101 U/L


()


 


Total Protein


 


 


 


 4.4 g/dL


(6.4-8.2)


 


Albumin


 


 


 


 1.1 g/dL


(3.4-5.0)


 


Albumin/Globulin Ratio    0.3 (1.0-1.7) 


 


Test


 6/20/20


05:55 6/20/20


13:03 6/20/20


23:59 6/21/20


05:15


 


Glucose (Fingerstick)


 131 mg/dL


(70-99) 170 mg/dL


(70-99) 132 mg/dL


(70-99) 





 


White Blood Count


 


 


 


 10.3 x10^3/uL


(4.0-11.0)


 


Red Blood Count


 


 


 


 3.10 x10^6/uL


(3.50-5.40)


 


Hemoglobin


 


 


 


 9.0 g/dL


(12.0-15.5)


 


Hematocrit


 


 


 


 26.3 %


(36.0-47.0)


 


Mean Corpuscular Volume    85 fL () 


 


Mean Corpuscular Hemoglobin    29 pg (25-35) 


 


Mean Corpuscular Hemoglobin


Concent 


 


 


 34 g/dL


(31-37)


 


Red Cell Distribution Width


 


 


 


 17.4 %


(11.5-14.5)


 


Platelet Count


 


 


 


 448 x10^3/uL


(140-400)


 


Neutrophils (%) (Auto)    74 % (31-73) 


 


Lymphocytes (%) (Auto)    17 % (24-48) 


 


Monocytes (%) (Auto)    7 % (0-9) 


 


Eosinophils (%) (Auto)    1 % (0-3) 


 


Basophils (%) (Auto)    0 % (0-3) 


 


Neutrophils # (Auto)


 


 


 


 7.7 x10^3/uL


(1.8-7.7)


 


Lymphocytes # (Auto)


 


 


 


 1.8 x10^3/uL


(1.0-4.8)


 


Monocytes # (Auto)


 


 


 


 0.8 x10^3/uL


(0.0-1.1)


 


Eosinophils # (Auto)


 


 


 


 0.1 x10^3/uL


(0.0-0.7)


 


Basophils # (Auto)


 


 


 


 0.0 x10^3/uL


(0.0-0.2)


 


Sodium Level


 


 


 


 136 mmol/L


(136-145)


 


Potassium Level


 


 


 


 4.0 mmol/L


(3.5-5.1)


 


Chloride Level


 


 


 


 102 mmol/L


()


 


Carbon Dioxide Level


 


 


 


 29 mmol/L


(21-32)


 


Anion Gap    5 (6-14) 


 


Blood Urea Nitrogen


 


 


 


 15 mg/dL


(7-20)


 


Creatinine


 


 


 


 0.6 mg/dL


(0.6-1.0)


 


Estimated GFR


(Cockcroft-Gault) 


 


 


 106.3 





 


BUN/Creatinine Ratio    25 (6-20) 


 


Glucose Level


 


 


 


 133 mg/dL


(70-99)


 


Calcium Level


 


 


 


 9.8 mg/dL


(8.5-10.1)


 


Total Bilirubin


 


 


 


 0.4 mg/dL


(0.2-1.0)


 


Aspartate Amino Transf


(AST/SGOT) 


 


 


 19 U/L (15-37) 





 


Alanine Aminotransferase


(ALT/SGPT) 


 


 


 15 U/L (14-59) 





 


Alkaline Phosphatase


 


 


 


 113 U/L


()


 


Total Protein


 


 


 


 4.3 g/dL


(6.4-8.2)


 


Albumin


 


 


 


 1.0 g/dL


(3.4-5.0)


 


Albumin/Globulin Ratio    0.3 (1.0-1.7) 








Laboratory Tests








Test


 6/20/20


13:03 6/20/20


23:59 6/21/20


05:15


 


Glucose (Fingerstick)


 170 mg/dL


(70-99) 132 mg/dL


(70-99) 





 


White Blood Count


 


 


 10.3 x10^3/uL


(4.0-11.0)


 


Red Blood Count


 


 


 3.10 x10^6/uL


(3.50-5.40)


 


Hemoglobin


 


 


 9.0 g/dL


(12.0-15.5)


 


Hematocrit


 


 


 26.3 %


(36.0-47.0)


 


Mean Corpuscular Volume   85 fL () 


 


Mean Corpuscular Hemoglobin   29 pg (25-35) 


 


Mean Corpuscular Hemoglobin


Concent 


 


 34 g/dL


(31-37)


 


Red Cell Distribution Width


 


 


 17.4 %


(11.5-14.5)


 


Platelet Count


 


 


 448 x10^3/uL


(140-400)


 


Neutrophils (%) (Auto)   74 % (31-73) 


 


Lymphocytes (%) (Auto)   17 % (24-48) 


 


Monocytes (%) (Auto)   7 % (0-9) 


 


Eosinophils (%) (Auto)   1 % (0-3) 


 


Basophils (%) (Auto)   0 % (0-3) 


 


Neutrophils # (Auto)


 


 


 7.7 x10^3/uL


(1.8-7.7)


 


Lymphocytes # (Auto)


 


 


 1.8 x10^3/uL


(1.0-4.8)


 


Monocytes # (Auto)


 


 


 0.8 x10^3/uL


(0.0-1.1)


 


Eosinophils # (Auto)


 


 


 0.1 x10^3/uL


(0.0-0.7)


 


Basophils # (Auto)


 


 


 0.0 x10^3/uL


(0.0-0.2)


 


Sodium Level


 


 


 136 mmol/L


(136-145)


 


Potassium Level


 


 


 4.0 mmol/L


(3.5-5.1)


 


Chloride Level


 


 


 102 mmol/L


()


 


Carbon Dioxide Level


 


 


 29 mmol/L


(21-32)


 


Anion Gap   5 (6-14) 


 


Blood Urea Nitrogen


 


 


 15 mg/dL


(7-20)


 


Creatinine


 


 


 0.6 mg/dL


(0.6-1.0)


 


Estimated GFR


(Cockcroft-Gault) 


 


 106.3 





 


BUN/Creatinine Ratio   25 (6-20) 


 


Glucose Level


 


 


 133 mg/dL


(70-99)


 


Calcium Level


 


 


 9.8 mg/dL


(8.5-10.1)


 


Total Bilirubin


 


 


 0.4 mg/dL


(0.2-1.0)


 


Aspartate Amino Transf


(AST/SGOT) 


 


 19 U/L (15-37) 





 


Alanine Aminotransferase


(ALT/SGPT) 


 


 15 U/L (14-59) 





 


Alkaline Phosphatase


 


 


 113 U/L


()


 


Total Protein


 


 


 4.3 g/dL


(6.4-8.2)


 


Albumin


 


 


 1.0 g/dL


(3.4-5.0)


 


Albumin/Globulin Ratio   0.3 (1.0-1.7) 








Microbiology


6/18/20 Blood Culture - Preliminary, Resulted


          NO GROWTH AFTER 2 DAYS


6/15/20 Gram Stain - Final, Complete


          


6/15/20 Aerobic and Anaerobic Culture - Final, Complete


          


6/13/20 Gram Stain Evaluation - Final, Complete


          


6/13/20 Respiratory Culture - Final, Complete


          


6/13/20 Antimicrobic Susceptibility - Final, Complete


          


6/7/20 Urine Culture - Final, Complete


         


5/30/20 Gram Stain - Final, Complete


          


5/30/20 Aerobic Culture - Final, Complete


Medications





Current Medications


Sodium Chloride 1,000 ml @  1,000 mls/hr Q1H IV  Last administered on 3/16/20at 

03:00;  Start 3/16/20 at 03:00;  Stop 3/16/20 at 03:59;  Status DC


Ondansetron HCl (Zofran) 4 mg 1X  ONCE IVP  Last administered on 3/16/20at 

03:27;  Start 3/16/20 at 03:00;  Stop 3/16/20 at 03:01;  Status DC


Morphine Sulfate (Morphine Sulfate) 4 mg 1X  ONCE IV ;  Start 3/16/20 at 03:00; 

Stop 3/16/20 at 03:01;  Status Cancel


Ketorolac Tromethamine (Toradol 30mg Vial) 30 mg 1X  ONCE IV  Last administered 

on 3/16/20at 02:54;  Start 3/16/20 at 03:00;  Stop 3/16/20 at 03:01;  Status DC


Fentanyl Citrate (Fentanyl 2ml Vial) 25 mcg 1X  ONCE IVP  Last administered on 

3/16/20at 03:23;  Start 3/16/20 at 03:30;  Stop 3/16/20 at 03:31;  Status DC


Fentanyl Citrate (Fentanyl 2ml Vial) 100 mcg STK-MED ONCE .ROUTE ;  Start 

3/16/20 at 03:18;  Stop 3/16/20 at 03:18;  Status DC


Iohexol (Omnipaque 350 Mg/ml) 90 ml 1X  ONCE IV  Last administered on 3/16/20at 

03:25;  Start 3/16/20 at 03:30;  Stop 3/16/20 at 03:31;  Status DC


Info (CONTRAST GIVEN -- Rx MONITORING) 1 each PRN DAILY  PRN MC SEE COMMENTS;  

Start 3/16/20 at 03:30;  Stop 3/18/20 at 03:29;  Status DC


Hydromorphone HCl (Dilaudid) 0.5 mg 1X  ONCE IV  Last administered on 3/16/20at 

03:55;  Start 3/16/20 at 04:30;  Stop 3/16/20 at 04:32;  Status DC


Ondansetron HCl (Zofran) 4 mg PRN Q8HRS  PRN IV NAUSEA/VOMITING 1ST CHOICE;  

Start 3/16/20 at 05:00;  Stop 3/16/20 at 09:27;  Status DC


Morphine Sulfate (Morphine Sulfate) 2 mg PRN Q2HR  PRN IV SEVERE PAIN 7-10 Last 

administered on 3/17/20at 12:26;  Start 3/16/20 at 05:00;  Stop 3/17/20 at 

14:15;  Status DC


Sodium Chloride 1,000 ml @  125 mls/hr Q8H IV  Last administered on 3/16/20at 

20:56;  Start 3/16/20 at 05:00;  Stop 3/17/20 at 04:59;  Status DC


Hydromorphone HCl (Dilaudid) 0.5 mg PRN Q3HRS  PRN IV SEVERE PAIN 7-10 Last 

administered on 3/17/20at 10:06;  Start 3/16/20 at 05:00;  Stop 3/17/20 at 

12:01;  Status DC


Piperacillin Sod/ Tazobactam Sod 4.5 gm/Sodium Chloride 100 ml @  200 mls/hr 1X 

ONCE IV  Last administered on 3/16/20at 05:44;  Start 3/16/20 at 06:00;  Stop 

3/16/20 at 06:29;  Status DC


Ondansetron HCl (Zofran) 4 mg PRN Q4HRS  PRN IV NAUSEA/VOMITING 1ST CHOICE Last 

administered on 6/21/20at 08:51;  Start 3/16/20 at 09:30


Insulin Human Lispro (HumaLOG) 0-9 UNITS Q6HRS SQ  Last administered on 

6/20/20at 13:09;  Start 3/16/20 at 09:30


Dextrose (Dextrose 50%-Water Syringe) 12.5 gm PRN Q15MIN  PRN IV SEE COMMENTS;  

Start 3/16/20 at 09:30


Pantoprazole Sodium (PROTONIX VIAL for IV PUSH) 40 mg DAILYAC IVP  Last 

administered on 6/21/20at 08:49;  Start 3/16/20 at 11:30


Prochlorperazine Edisylate (Compazine) 10 mg PRN Q6HRS  PRN IV NAUSEA/VOMITING, 

2nd CHOICE Last administered on 6/20/20at 23:50;  Start 3/16/20 at 17:45


Atenolol (Tenormin) 100 mg DAILY PO ;  Start 3/17/20 at 09:00;  Stop 3/16/20 at 

20:08;  Status DC


Metoprolol Tartrate (Lopressor Vial) 2.5 mg Q6HRS IVP  Last administered on 

3/17/20at 05:51;  Start 3/16/20 at 20:15;  Stop 3/17/20 at 10:02;  Status DC


Metoprolol Tartrate (Lopressor Vial) 5 mg Q6HRS IVP  Last administered on 

3/26/20at 00:12;  Start 3/17/20 at 10:15;  Stop 3/28/20 at 08:48;  Status DC


Hydromorphone HCl (Dilaudid) 1 mg PRN Q3HRS  PRN IV SEVERE PAIN 7-10 Last 

administered on 3/23/20at 05:13;  Start 3/17/20 at 12:00;  Stop 3/31/20 at 

00:25;  Status DC


Lidocaine HCl (Buffered Lidocaine 1%) 3 ml STK-MED ONCE .ROUTE ;  Start 3/17/20 

at 12:55;  Stop 3/17/20 at 12:56;  Status DC


Albumin Human 500 ml @  125 mls/hr 1X  ONCE IV  Last administered on 3/17/20at 

14:33;  Start 3/17/20 at 14:30;  Stop 3/17/20 at 18:32;  Status DC


Norepinephrine Bitartrate 8 mg/ Dextrose 258 ml @  17.299 mls/ hr CONT  PRN IV 

PER PROTOCOL Last administered on 4/14/20at 12:48;  Start 3/17/20 at 15:30;  

Stop 4/17/20 at 09:19;  Status DC


Sodium Chloride 1,000 ml @  125 mls/hr Q8H IV  Last administered on 3/17/20at 

21:04;  Start 3/17/20 at 16:00;  Stop 3/18/20 at 02:42;  Status DC


Albumin Human 500 ml @  125 mls/hr PRN BID  PRN IV After every 2L NSS & BP < 

90mm Last administered on 6/6/20at 11:40;  Start 3/17/20 at 16:00


Iohexol (Omnipaque 300 Mg/ml) 60 ml 1X  ONCE IV  Last administered on 3/17/20at 

17:20;  Start 3/17/20 at 17:00;  Stop 3/17/20 at 17:01;  Status DC


Info (CONTRAST GIVEN -- Rx MONITORING) 1 each PRN DAILY  PRN MC SEE COMMENTS;  

Start 3/17/20 at 17:00;  Stop 3/19/20 at 16:59;  Status DC


Meropenem 1 gm/ Sodium Chloride 100 ml @  200 mls/hr Q8HRS IV  Last administered

on 3/18/20at 05:45;  Start 3/17/20 at 20:00;  Stop 3/18/20 at 08:48;  Status DC


Furosemide (Lasix) 40 mg 1X  ONCE IVP  Last administered on 3/17/20at 22:12;  

Start 3/17/20 at 22:30;  Stop 3/17/20 at 22:31;  Status DC


Calcium Chloride 1000 mg/Sodium Chloride 110 ml @  220 mls/hr 1X  ONCE IV  Last 

administered on 3/17/20at 22:11;  Start 3/17/20 at 22:30;  Stop 3/17/20 at 

22:59;  Status DC


Albuterol Sulfate (Ventolin Neb Soln) 2.5 mg 1X  ONCE NEB  Last administered on 

3/18/20at 00:56;  Start 3/17/20 at 22:30;  Stop 3/17/20 at 22:31;  Status DC


Insulin Human Regular (HumuLIN R VIAL) 5 unit 1X  ONCE IV  Last administered on 

3/17/20at 22:14;  Start 3/17/20 at 22:30;  Stop 3/17/20 at 22:31;  Status DC


Magnesium Sulfate 50 ml @ 25 mls/hr 1X  ONCE IV  Last administered on 3/18/20at 

02:57;  Start 3/18/20 at 03:00;  Stop 3/18/20 at 04:59;  Status DC


Calcium Gluconate 1000 mg/Sodium Chloride 110 ml @  220 mls/hr 1X  ONCE IV  Last

administered on 3/18/20at 02:46;  Start 3/18/20 at 03:00;  Stop 3/18/20 at 

03:29;  Status DC


Sodium Chloride 1,000 ml @  200 mls/hr Q5H IV  Last administered on 3/18/20at 

02:46;  Start 3/18/20 at 03:00;  Stop 3/18/20 at 10:21;  Status DC


Calcium Gluconate 1000 mg/Sodium Chloride 110 ml @  220 mls/hr 1X  ONCE IV  Last

administered on 3/18/20at 03:21;  Start 3/18/20 at 03:30;  Stop 3/18/20 at 

03:59;  Status DC


Sodium Bicarbonate 50 meq/Sodium Chloride 1,050 ml @  75 mls/hr Q14H IV  Last 

administered on 3/22/20at 21:10;  Start 3/18/20 at 07:30;  Stop 3/23/20 at 

10:28;  Status DC


Calcium Gluconate 2000 mg/Sodium Chloride 120 ml @  220 mls/hr 1X  ONCE IV  Last

administered on 3/18/20at 09:05;  Start 3/18/20 at 07:30;  Stop 3/18/20 at 

08:02;  Status DC


Lidocaine HCl (Xylocaine-Mpf 1% 2ml Vial) 2 ml STK-MED ONCE .ROUTE ;  Start 

3/18/20 at 08:47;  Stop 3/18/20 at 08:47;  Status DC


Meropenem 500 mg/ Sodium Chloride 50 ml @  100 mls/hr Q12HR IV  Last 

administered on 3/23/20at 21:01;  Start 3/18/20 at 18:00;  Stop 3/24/20 at 

07:58;  Status DC


Lidocaine HCl (Buffered Lidocaine 1%) 3 ml STK-MED ONCE .ROUTE ;  Start 3/18/20 

at 09:46;  Stop 3/18/20 at 09:46;  Status DC


Lidocaine HCl (Buffered Lidocaine 1%) 6 ml 1X  ONCE INJ  Last administered on 

3/18/20at 10:26;  Start 3/18/20 at 10:15;  Stop 3/18/20 at 10:16;  Status DC


Info (Tpn Per Pharmacy) 1 each PRN DAILY  PRN MC SEE COMMENTS Last administered 

on 6/20/20at 08:55;  Start 3/18/20 at 12:00


Sodium Chloride 1,000 ml @  1,000 mls/hr Q1H PRN IV hypotension;  Start 3/18/20 

at 12:07;  Stop 3/18/20 at 18:06;  Status DC


Diphenhydramine HCl (Benadryl) 25 mg 1X PRN  PRN IV ITCHING;  Start 3/18/20 at 

12:15;  Stop 3/19/20 at 12:14;  Status DC


Diphenhydramine HCl (Benadryl) 25 mg 1X PRN  PRN IV ITCHING;  Start 3/18/20 at 

12:15;  Stop 3/19/20 at 12:14;  Status DC


Sodium Chloride 1,000 ml @  400 mls/hr Q2H30M PRN IV PATENCY;  Start 3/18/20 at 

12:07;  Stop 3/19/20 at 00:06;  Status DC


Info (PHARMACY MONITORING -- do not chart) 1 each PRN DAILY  PRN MC SEE 

COMMENTS;  Start 3/18/20 at 12:15;  Stop 3/20/20 at 08:13;  Status DC


Sodium Chloride 90 meq/Calcium Gluconate 10 meq/ Multivitamins 10 ml/Chromium/ 

Copper/Manganese/ Seleni/Zn 1 ml/ Total Parenteral Nutrition/Amino 

Acids/Dextrose/ Fat Emulsion Intravenous 55.005 ml  @ 2.292 mls/hr TPN  CONT IV 

;  Start 3/18/20 at 22:00;  Stop 3/18/20 at 12:33;  Status DC


Info (Tpn Per Pharmacy) 1 each PRN DAILY  PRN MC SEE COMMENTS;  Start 3/18/20 at

12:30;  Status UNV


Sodium Chloride 90 meq/Calcium Gluconate 10 meq/ Multivitamins 10 ml/Chromium/ 

Copper/Manganese/ Seleni/Zn 0.5 ml/ Total Parenteral Nutrition/Amino 

Acids/Dextrose/ Fat Emulsion Intravenous 1,512 ml @  63 mls/hr TPN  CONT IV  

Last administered on 3/18/20at 22:06;  Start 3/18/20 at 22:00;  Stop 3/19/20 at 

21:59;  Status DC


Calcium Carbonate/ Glycine (Tums) 500 mg PRN AFTMEALHC  PRN PO INDIGESTION;  

Start 3/18/20 at 17:45;  Stop 5/13/20 at 10:25;  Status DC


Calcium Gluconate (Calcium Gluconate) 2,000 mg 1X  ONCE IVP  Last administered 

on 3/19/20at 02:19;  Start 3/19/20 at 02:15;  Stop 3/19/20 at 02:16;  Status DC


Calcium Chloride 3000 mg/Sodium Chloride 1,030 ml @  50 mls/hr [id number] IV  Last 

administered on 3/21/20at 02:17;  Start 3/19/20 at 08:00;  Stop 3/21/20 at 

15:23;  Status DC


Lorazepam (Ativan Inj) 1 mg PRN Q4HRS  PRN IVP ANXIETY / AGITATION, 2nd choic 

Last administered on 4/17/20at 03:51;  Start 3/19/20 at 09:00;  Stop 4/17/20 at 

09:19;  Status DC


Sodium Chloride 1,000 ml @  1,000 mls/hr Q1H PRN IV hypotension;  Start 3/19/20 

at 08:56;  Stop 3/19/20 at 14:55;  Status DC


Albumin Human 200 ml @  200 mls/hr 1X PRN  PRN IV Hypotension;  Start 3/19/20 at

09:00;  Stop 3/19/20 at 14:59;  Status DC


Diphenhydramine HCl (Benadryl) 25 mg 1X PRN  PRN IV ITCHING;  Start 3/19/20 at 

09:00;  Stop 3/20/20 at 08:59;  Status DC


Diphenhydramine HCl (Benadryl) 25 mg 1X PRN  PRN IV ITCHING;  Start 3/19/20 at 

09:00;  Stop 3/20/20 at 08:59;  Status DC


Sodium Chloride 1,000 ml @  400 mls/hr Q2H30M PRN IV PATENCY;  Start 3/19/20 at 

08:56;  Stop 3/19/20 at 20:55;  Status DC


Info (PHARMACY MONITORING -- do not chart) 1 each PRN DAILY  PRN MC SEE 

COMMENTS;  Start 3/19/20 at 09:00;  Status UNV


Info (PHARMACY MONITORING -- do not chart) 1 each PRN DAILY  PRN MC SEE 

COMMENTS;  Start 3/19/20 at 09:00;  Stop 3/20/20 at 08:13;  Status DC


Digoxin (Lanoxin) 500 mcg 1X  ONCE IV  Last administered on 3/19/20at 10:04;  

Start 3/19/20 at 10:00;  Stop 3/19/20 at 10:01;  Status DC


Digoxin (Lanoxin) 125 mcg 1X  ONCE IV  Last administered on 3/19/20at 17:10;  

Start 3/19/20 at 18:00;  Stop 3/19/20 at 18:01;  Status DC


Magnesium Sulfate 100 ml @  25 mls/hr 1X  ONCE IV  Last administered on 

3/19/20at 12:48;  Start 3/19/20 at 13:00;  Stop 3/19/20 at 16:59;  Status DC


Sodium Chloride 90 meq/Magnesium Sulfate 10 meq/ Calcium Gluconate 20 meq/ Mu

ltivitamins 10 ml/Chromium/ Copper/Manganese/ Seleni/Zn 0.5 ml/ Total Parenteral

Nutrition/Amino Acids/Dextrose/ Fat Emulsion Intravenous 1,512 ml @  63 mls/hr 

TPN  CONT IV  Last administered on 3/19/20at 22:25;  Start 3/19/20 at 22:00;  

Stop 3/20/20 at 21:59;  Status DC


Sodium Chloride 1,000 ml @  1,000 mls/hr Q1H PRN IV hypotension;  Start 3/20/20 

at 08:05;  Stop 3/20/20 at 14:04;  Status DC


Albumin Human 200 ml @  200 mls/hr 1X  ONCE IV  Last administered on 3/20/20at 

08:57;  Start 3/20/20 at 08:15;  Stop 3/20/20 at 09:14;  Status DC


Diphenhydramine HCl (Benadryl) 25 mg 1X PRN  PRN IV ITCHING;  Start 3/20/20 at 

08:15;  Stop 3/21/20 at 08:14;  Status DC


Diphenhydramine HCl (Benadryl) 25 mg 1X PRN  PRN IV ITCHING;  Start 3/20/20 at 

08:15;  Stop 3/21/20 at 08:14;  Status DC


Sodium Chloride 1,000 ml @  400 mls/hr Q2H30M PRN IV PATENCY;  Start 3/20/20 at 

08:05;  Stop 3/20/20 at 20:04;  Status DC


Info (PHARMACY MONITORING -- do not chart) 1 each PRN DAILY  PRN MC SEE 

COMMENTS;  Start 3/20/20 at 08:15;  Stop 3/24/20 at 07:57;  Status DC


Sodium Chloride 90 meq/Potassium Chloride 15 meq/ Potassium Phosphate 10 mmol/ 

Magnesium Sulfate 10 meq/Calcium Gluconate 20 meq/ Multivitamins 10 ml/Chromium/

Copper/Manganese/ Seleni/Zn 0.5 ml/ Total Parenteral Nutrition/Amino Ac

ids/Dextrose/ Fat Emulsion Intravenous 1,512 ml @  63 mls/hr TPN  CONT IV  Last 

administered on 3/20/20at 21:01;  Start 3/20/20 at 22:00;  Stop 3/21/20 at 

21:59;  Status DC


Potassium Chloride/Water 100 ml @  100 mls/hr 1X  ONCE IV  Last administered on 

3/20/20at 14:09;  Start 3/20/20 at 14:00;  Stop 3/20/20 at 14:59;  Status DC


Benzocaine (Hurricaine One) 1 spray 1X  ONCE MM  Last administered on 3/20/20at 

16:38;  Start 3/20/20 at 14:30;  Stop 3/20/20 at 14:31;  Status DC


Lidocaine HCl (Glydo (Lidocaine) Jelly) 1 thomas 1X  ONCE MM  Last administered on 

3/20/20at 16:38;  Start 3/20/20 at 14:30;  Stop 3/20/20 at 14:31;  Status DC


Linezolid/Dextrose 300 ml @  300 mls/hr Q12HR IV  Last administered on 3/26/20at

21:04;  Start 3/20/20 at 20:00;  Stop 3/27/20 at 07:50;  Status DC


Acetaminophen (Tylenol) 650 mg PRN Q6HRS  PRN PO MILD PAIN / TEMP;  Start 

3/21/20 at 03:30;  Stop 3/21/20 at 03:36;  Status DC


Acetaminophen (Tylenol) 650 mg PRN Q6HRS  PRN PEG MILD PAIN / TEMP Last 

administered on 4/16/20at 19:56;  Start 3/21/20 at 03:36;  Stop 5/13/20 at 

10:25;  Status DC


Sodium Chloride 1,000 ml @  1,000 mls/hr Q1H PRN IV hypotension;  Start 3/21/20 

at 07:50;  Stop 3/21/20 at 13:49;  Status DC


Albumin Human 200 ml @  200 mls/hr 1X PRN  PRN IV Hypotension;  Start 3/21/20 at

08:00;  Stop 3/21/20 at 13:59;  Status DC


Sodium Chloride (Normal Saline Flush) 10 ml 1X PRN  PRN IV AP catheter pack;  

Start 3/21/20 at 08:00;  Stop 3/22/20 at 07:59;  Status DC


Sodium Chloride (Normal Saline Flush) 10 ml 1X PRN  PRN IV  catheter pack;  

Start 3/21/20 at 08:00;  Stop 3/22/20 at 07:59;  Status DC


Sodium Chloride 1,000 ml @  400 mls/hr Q2H30M PRN IV PATENCY;  Start 3/21/20 at 

07:50;  Stop 3/21/20 at 19:49;  Status DC


Info (PHARMACY MONITORING -- do not chart) 1 each PRN DAILY  PRN MC SEE CO

MMENTS;  Start 3/21/20 at 08:00;  Status UNV


Info (PHARMACY MONITORING -- do not chart) 1 each PRN DAILY  PRN MC SEE 

COMMENTS;  Start 3/21/20 at 08:00;  Stop 3/23/20 at 08:25;  Status DC


Sodium Chloride 90 meq/Potassium Chloride 15 meq/ Potassium Phosphate 10 mmol/ 

Magnesium Sulfate 10 meq/Calcium Gluconate 20 meq/ Multivitamins 10 ml/Chromium/

Copper/Manganese/ Seleni/Zn 0.5 ml/ Total Parenteral Nutrition/Amino 

Acids/Dextrose/ Fat Emulsion Intravenous 1,512 ml @  63 mls/hr TPN  CONT IV  

Last administered on 3/21/20at 20:57;  Start 3/21/20 at 22:00;  Stop 3/22/20 at 

21:59;  Status DC


Sodium Chloride 90 meq/Potassium Chloride 15 meq/ Potassium Phosphate 15 mmol/ 

Magnesium Sulfate 10 meq/Calcium Gluconate 20 meq/ Multivitamins 10 ml/Chromium/

Copper/Manganese/ Seleni/Zn 0.5 ml/ Total Parenteral Nutrition/Amino 

Acids/Dextrose/ Fat Emulsion Intravenous 1,512 ml @  63 mls/hr TPN  CONT IV ;  

Start 3/22/20 at 22:00;  Stop 3/22/20 at 14:16;  Status DC


Sodium Chloride 90 meq/Potassium Chloride 15 meq/ Potassium Phosphate 15 mmol/ 

Magnesium Sulfate 10 meq/Calcium Gluconate 20 meq/ Multivitamins 10 ml/Chromium/

Copper/Manganese/ Seleni/Zn 0.5 ml/ Total Parenteral Nutrition/Amino 

Acids/Dextrose/ Fat Emulsion Intravenous 1,200 ml @  50 mls/hr TPN  CONT IV ;  

Start 3/22/20 at 22:00;  Stop 3/22/20 at 14:17;  Status DC


Sodium Chloride 90 meq/Potassium Chloride 15 meq/ Potassium Phosphate 10 mmol/ 

Magnesium Sulfate 10 meq/Calcium Gluconate 20 meq/ Multivitamins 10 ml/Chromium/

Copper/Manganese/ Seleni/Zn 0.5 ml/ Total Parenteral Nutrition/Amino 

Acids/Dextrose/ Fat Emulsion Intravenous 1,200 ml @  50 mls/hr TPN  CONT IV  

Last administered on 3/22/20at 23:29;  Start 3/22/20 at 22:00;  Stop 3/23/20 at 

21:59;  Status DC


Sodium Chloride 1,000 ml @  1,000 mls/hr Q1H PRN IV hypotension;  Start 3/23/20 

at 07:28;  Stop 3/23/20 at 13:27;  Status DC


Albumin Human 200 ml @  200 mls/hr 1X  ONCE IV  Last administered on 3/23/20at 

08:51;  Start 3/23/20 at 07:30;  Stop 3/23/20 at 08:29;  Status DC


Diphenhydramine HCl (Benadryl) 25 mg 1X PRN  PRN IV ITCHING;  Start 3/23/20 at 

07:30;  Stop 3/24/20 at 07:29;  Status DC


Diphenhydramine HCl (Benadryl) 25 mg 1X PRN  PRN IV ITCHING;  Start 3/23/20 at 

07:30;  Stop 3/24/20 at 07:29;  Status DC


Sodium Chloride 1,000 ml @  400 mls/hr Q2H30M PRN IV PATENCY;  Start 3/23/20 at 

07:28;  Stop 3/23/20 at 19:27;  Status DC


Info (PHARMACY MONITORING -- do not chart) 1 each PRN DAILY  PRN MC SEE 

COMMENTS;  Start 3/23/20 at 07:30;  Stop 4/3/20 at 13:01;  Status DC


Metronidazole 100 ml @  100 mls/hr Q6HRS IV  Last administered on 4/8/20at 

06:26;  Start 3/23/20 at 08:30;  Stop 4/8/20 at 09:58;  Status DC


Micafungin Sodium 100 mg/Dextrose 100 ml @  100 mls/hr Q24H IV  Last 

administered on 4/30/20at 08:18;  Start 3/23/20 at 09:00;  Stop 4/30/20 at 

20:58;  Status DC


Propofol 0 ml @ As Directed STK-MED ONCE IV ;  Start 3/23/20 at 07:53;  Stop 

3/23/20 at 07:53;  Status DC


Etomidate (Amidate) 20 mg STK-MED ONCE IV ;  Start 3/23/20 at 07:53;  Stop 

3/23/20 at 07:54;  Status DC


Midazolam HCl (Versed) 5 mg STK-MED ONCE .ROUTE ;  Start 3/23/20 at 07:57;  Stop

3/23/20 at 07:57;  Status DC


Fentanyl Citrate 30 ml @ 0 mls/hr CONT  PRN IV SEE PROTOCOL Last administered on

4/17/20at 06:12;  Start 3/23/20 at 08:15;  Stop 4/17/20 at 09:19;  Status DC


Artificial Tears (Artificial Tears) 1 drop PRN Q1HR  PRN OU DRY EYE, 1st choice;

 Start 3/23/20 at 08:15;  Stop 4/29/20 at 05:31;  Status DC


Midazolam HCl 50 mg/Sodium Chloride 50 ml @ 0 mls/hr CONT  PRN IV SEE PROTOCOL 

Last administered on 3/26/20at 22:39;  Start 3/23/20 at 08:15;  Stop 3/28/20 at 

15:59;  Status DC


Etomidate (Amidate) 8 mg 1X  ONCE IV  Last administered on 3/23/20at 08:33;  

Start 3/23/20 at 08:30;  Stop 3/23/20 at 08:31;  Status DC


Succinylcholine Chloride (Anectine) 120 mg 1X  ONCE IV  Last administered on 

3/23/20at 08:34;  Start 3/23/20 at 08:30;  Stop 3/23/20 at 08:31;  Status DC


Midazolam HCl (Versed) 5 mg 1X  ONCE IV ;  Start 3/23/20 at 08:30;  Stop 3/23/20

at 08:31;  Status DC


Potassium Chloride 15 meq/ Bicarbonate Dialysis Soln w/ out KCl 5,007.5 ml  @ 

1,000 mls/ hr Q5H1M IV  Last administered on 3/24/20at 11:11;  Start 3/23/20 at 

12:00;  Stop 3/24/20 at 11:15;  Status DC


Potassium Chloride 15 meq/ Bicarbonate Dialysis Soln w/ out KCl 5,007.5 ml  @ 

1,000 mls/ hr Q5H1M IV  Last administered on 3/24/20at 11:12;  Start 3/23/20 at 

12:00;  Stop 3/24/20 at 11:17;  Status DC


Potassium Chloride 15 meq/ Bicarbonate Dialysis Soln w/ out KCl 5,007.5 ml  @ 

1,000 mls/ hr Q5H1M IV  Last administered on 3/24/20at 11:11;  Start 3/23/20 at 

12:00;  Stop 3/24/20 at 11:19;  Status DC


Sodium Chloride 90 meq/Potassium Chloride 15 meq/ Potassium Phosphate 10 mmol/ 

Magnesium Sulfate 10 meq/Calcium Gluconate 20 meq/ Multivitamins 10 ml/Chromium/

Copper/Manganese/ Seleni/Zn 0.5 ml/ Total Parenteral Nutrition/Amino 

Acids/Dextrose/ Fat Emulsion Intravenous 1,400 ml @  58.333 mls/ hr TPN  CONT IV

 Last administered on 3/23/20at 21:42;  Start 3/23/20 at 22:00;  Stop 3/24/20 at

21:59;  Status DC


Heparin Sodium (Porcine) (Heparin Sodium) 5,000 unit Q8HRS SQ  Last administered

on 3/28/20at 05:55;  Start 3/23/20 at 15:00;  Stop 3/28/20 at 13:28;  Status DC


Meropenem 500 mg/ Sodium Chloride 50 ml @  100 mls/hr Q6HRS IV  Last 

administered on 3/25/20at 06:00;  Start 3/24/20 at 09:00;  Stop 3/25/20 at 

07:29;  Status DC


Potassium Phosphate 20 mmol/ Sodium Chloride 106.6667 ml @  51.667 m... 1X  ONCE

IV  Last administered on 3/24/20at 11:22;  Start 3/24/20 at 10:15;  Stop 3/24/20

at 12:18;  Status DC


Acetaminophen (Tylenol Supp) 650 mg PRN Q6HRS  PRN [place] MILD PAIN / TEMP > 100.3'F

Last administered on 6/18/20at 10:16;  Start 3/24/20 at 10:30


Potassium Chloride/Water 100 ml @  100 mls/hr Q1H IV  Last administered on 

3/24/20at 12:12;  Start 3/24/20 at 11:00;  Stop 3/24/20 at 12:59;  Status DC


Potassium Chloride 20 meq/ Bicarbonate Dialysis Soln w/ out KCl 5,010 ml @  

1,000 mls/hr Q5H1M IV  Last administered on 3/25/20at 08:48;  Start 3/24/20 at 

12:00;  Stop 3/25/20 at 13:03;  Status DC


Potassium Chloride 20 meq/ Bicarbonate Dialysis Soln w/ out KCl 5,010 ml @  

1,000 mls/hr Q5H1M IV  Last administered on 3/29/20at 14:52;  Start 3/24/20 at 

11:30;  Stop 3/29/20 at 19:59;  Status DC


Potassium Chloride 20 meq/ Bicarbonate Dialysis Soln w/ out KCl 5,010 ml @  

1,000 mls/hr Q5H1M IV  Last administered on 3/29/20at 14:53;  Start 3/24/20 at 

11:30;  Stop 3/29/20 at 19:59;  Status DC


Sodium Chloride 90 meq/Potassium Chloride 15 meq/ Potassium Phosphate 15 mmol/ 

Magnesium Sulfate 10 meq/Calcium Gluconate 15 meq/ Multivitamins 10 ml/Chromium/

Copper/Manganese/ Seleni/Zn 0.5 ml/ Total Parenteral Nutrition/Amino 

Acids/Dextrose/ Fat Emulsion Intravenous 1,400 ml @  58.333 mls/ hr TPN  CONT IV

 Last administered on 3/24/20at 22:17;  Start 3/24/20 at 22:00;  Stop 3/25/20 at

21:59;  Status DC


Cefepime HCl (Maxipime) 2 gm Q12HR IVP  Last administered on 4/7/20at 20:56;  

Start 3/25/20 at 09:00;  Stop 4/8/20 at 09:58;  Status DC


Daptomycin 500 mg/ Sodium Chloride 50 ml @  100 mls/hr Q48H IV  Last 

administered on 4/10/20at 09:57;  Start 3/25/20 at 08:30;  Stop 4/10/20 at 

10:07;  Status DC


Lidocaine HCl (Buffered Lidocaine 1%) 3 ml 1X  ONCE INJ  Last administered on 

3/25/20at 10:27;  Start 3/25/20 at 10:30;  Stop 3/25/20 at 10:31;  Status DC


Potassium Phosphate 20 mmol/ Sodium Chloride 106.6667 ml @  51.667 m... 1X  ONCE

IV  Last administered on 3/25/20at 12:51;  Start 3/25/20 at 13:00;  Stop 3/25/20

at 15:03;  Status DC


Sodium Chloride 90 meq/Potassium Chloride 15 meq/ Potassium Phosphate 18 mmol/ 

Magnesium Sulfate 8 meq/Calcium Gluconate 15 meq/ Multivitamins 10 ml/Chromium/ 

Copper/Manganese/ Seleni/Zn 0.5 ml/ Total Parenteral Nutrition/Amino Acids/Dext

verenice/ Fat Emulsion Intravenous 1,400 ml @  58.333 mls/ hr TPN  CONT IV  Last 

administered on 3/25/20at 22:16;  Start 3/25/20 at 22:00;  Stop 3/26/20 at 

21:59;  Status DC


Potassium Chloride 20 meq/ Bicarbonate Dialysis Soln w/ out KCl 5,010 ml @  

1,000 mls/hr Q5H1M IV  Last administered on 3/29/20at 14:54;  Start 3/25/20 at 

16:00;  Stop 3/29/20 at 19:59;  Status DC


Multi-Ingred Cream/Lotion/Oil/ Oint (Artificial Tears Eye Ointment) 1 thomas PRN 

Q1HR  PRN OU DRY EYE, 2nd choice Last administered on 4/13/20at 08:19;  Start 

3/25/20 at 17:30;  Stop 6/3/20 at 14:39;  Status DC


Sodium Chloride 90 meq/Potassium Chloride 15 meq/ Potassium Phosphate 18 mmol/ 

Magnesium Sulfate 8 meq/Calcium Gluconate 15 meq/ Multivitamins 10 ml/Chromium/ 

Copper/Manganese/ Seleni/Zn 0.5 ml/ Total Parenteral Nutrition/Amino 

Acids/Dextrose/ Fat Emulsion Intravenous 1,400 ml @  58.333 mls/ hr TPN  CONT IV

 Last administered on 3/26/20at 22:00;  Start 3/26/20 at 22:00;  Stop 3/27/20 at

21:59;  Status DC


Albumin Human 500 ml @  125 mls/hr 1X  ONCE IV ;  Start 3/26/20 at 14:15;  Stop 

3/26/20 at 18:14;  Status DC


Sodium Chloride 90 meq/Potassium Chloride 15 meq/ Potassium Phosphate 18 mmol/ 

Magnesium Sulfate 8 meq/Calcium Gluconate 15 meq/ Multivitamins 10 ml/Chromium/ 

Copper/Manganese/ Seleni/Zn 0.5 ml/ Insulin Human Regular 10 unit/ Total 

Parenteral Nutrition/Amino Acids/Dextrose/ Fat Emulsion Intravenous 1,400 ml @  

58.333 mls/ hr TPN  CONT IV  Last administered on 3/27/20at 21:43;  Start 

3/27/20 at 22:00;  Stop 3/28/20 at 21:59;  Status DC


Lidocaine HCl (Buffered Lidocaine 1%) 3 ml STK-MED ONCE .ROUTE ;  Start 3/25/20 

at 10:00;  Stop 3/27/20 at 13:57;  Status DC


Midazolam HCl 100 mg/Sodium Chloride 100 ml @ 7 mls/hr CONT  PRN IV SEE PROTOCOL

Last administered on 4/8/20at 15:35;  Start 3/28/20 at 16:00;  Stop 6/3/20 at 

14:38;  Status DC


Sodium Chloride 90 meq/Potassium Chloride 15 meq/ Potassium Phosphate 18 mmol/ 

Magnesium Sulfate 8 meq/Calcium Gluconate 15 meq/ Multivitamins 10 ml/Chromium/ 

Copper/Manganese/ Seleni/Zn 0.5 ml/ Insulin Human Regular 15 unit/ Total 

Parenteral Nutrition/Amino Acids/Dextrose/ Fat Emulsion Intravenous 1,400 ml @  

58.333 mls/ hr TPN  CONT IV  Last administered on 3/28/20at 20:34;  Start 

3/28/20 at 22:00;  Stop 3/29/20 at 21:59;  Status DC


Info ([facility] Electrolyte Protocol) 1 ea CONT PRN  PRN MC PER PROTOCOL;  Start 

3/29/20 at 13:15


Sodium Chloride 90 meq/Potassium Chloride 15 meq/ Potassium Phosphate 18 mmol/ 

Magnesium Sulfate 8 meq/Calcium Gluconate 15 meq/ Multivitamins 10 ml/Chromium/ 

Copper/Manganese/ Seleni/Zn 0.5 ml/ Insulin Human Regular 15 unit/ Total 

Parenteral Nutrition/Amino Acids/Dextrose/ Fat Emulsion Intravenous 1,400 ml @  

58.333 mls/ hr TPN  CONT IV  Last administered on 3/29/20at 22:05;  Start 

3/29/20 at 22:00;  Stop 3/30/20 at 21:59;  Status DC


Potassium Chloride 15 meq/ Bicarbonate Dialysis Soln w/ out KCl 5,007.5 ml  @ 

1,000 mls/ hr Q5H1M IV  Last administered on 4/1/20at 18:14;  Start 3/29/20 at 

20:00;  Stop 4/2/20 at 13:08;  Status DC


Potassium Chloride 15 meq/ Bicarbonate Dialysis Soln w/ out KCl 5,007.5 ml  @ 

1,000 mls/ hr Q5H1M IV  Last administered on 4/1/20at 18:14;  Start 3/29/20 at 

20:00;  Stop 4/2/20 at 13:08;  Status DC


Potassium Chloride 15 meq/ Bicarbonate Dialysis Soln w/ out KCl 5,007.5 ml  @ 

1,000 mls/ hr Q5H1M IV  Last administered on 4/1/20at 18:14;  Start 3/29/20 at 

20:00;  Stop 4/2/20 at 13:08;  Status DC


Iohexol (Omnipaque 240 Mg/ml) 30 ml 1X  ONCE PO  Last administered on 3/30/20at 

11:30;  Start 3/30/20 at 11:30;  Stop 3/30/20 at 11:33;  Status DC


Info (CONTRAST GIVEN -- Rx MONITORING) 1 each PRN DAILY  PRN MC SEE COMMENTS;  

Start 3/30/20 at 11:45;  Stop 4/1/20 at 11:44;  Status DC


Sodium Chloride 90 meq/Potassium Chloride 15 meq/ Potassium Phosphate 18 mmol/ 

Magnesium Sulfate 8 meq/Calcium Gluconate 15 meq/ Multivitamins 10 ml/Chromium/ 

Copper/Manganese/ Seleni/Zn 0.5 ml/ Insulin Human Regular 15 unit/ Total 

Parenteral Nutrition/Amino Acids/Dextrose/ Fat Emulsion Intravenous 1,400 ml @  

58.333 mls/ hr TPN  CONT IV  Last administered on 3/30/20at 21:47;  Start 

3/30/20 at 22:00;  Stop 3/31/20 at 21:59;  Status DC


Sodium Chloride 90 meq/Potassium Chloride 15 meq/ Potassium Phosphate 18 mmol/ 

Magnesium Sulfate 8 meq/Calcium Gluconate 15 meq/ Multivitamins 10 ml/Chromium/ 

Copper/Manganese/ Seleni/Zn 0.5 ml/ Insulin Human Regular 20 unit/ Total 

Parenteral Nutrition/Amino Acids/Dextrose/ Fat Emulsion Intravenous 1,400 ml @  

58.333 mls/ hr TPN  CONT IV  Last administered on 3/31/20at 21:36;  Start 

3/31/20 at 22:00;  Stop 4/1/20 at 21:59;  Status DC


Alteplase, Recombinant (Cathflo For Central Catheter Clearance) 1 mg 1X  ONCE 

INT CAT  Last administered on 3/31/20at 20:03;  Start 3/31/20 at 19:30;  Stop 

3/31/20 at 19:46;  Status DC


Alteplase, Recombinant (Cathflo For Central Catheter Clearance) 1 mg 1X  ONCE 

INT CAT  Last administered on 3/31/20at 22:05;  Start 3/31/20 at 22:00;  Stop 

3/31/20 at 22:01;  Status DC


Sodium Chloride 90 meq/Potassium Chloride 15 meq/ Potassium Phosphate 18 mmol/ 

Magnesium Sulfate 8 meq/Calcium Gluconate 15 meq/ Multivitamins 10 ml/Chromium/ 

Copper/Manganese/ Seleni/Zn 0.5 ml/ Insulin Human Regular 20 unit/ Total 

Parenteral Nutrition/Amino Acids/Dextrose/ Fat Emulsion Intravenous 1,400 ml @  

58.333 mls/ hr TPN  CONT IV  Last administered on 4/1/20at 21:30;  Start 4/1/20 

at 22:00;  Stop 4/2/20 at 21:59;  Status DC


Dexmedetomidine HCl 400 mcg/ Sodium Chloride 100 ml @ 0 mls/hr CONT  PRN IV 

ANXIETY / AGITATION Last administered on 5/30/20at 12:57;  Start 4/2/20 at 

08:15;  Stop 5/30/20 at 18:31;  Status DC


Sodium Chloride 500 ml @  500 mls/hr 1X PRN  PRN IV ELEVATED BP, SEE COMMENTS;  

Start 4/2/20 at 08:15


Atropine Sulfate (ATROPINE 0.5mg SYRINGE) 0.5 mg PRN Q5MIN  PRN IV SEE COMMENTS;

 Start 4/2/20 at 08:15


Furosemide (Lasix) 20 mg 1X  ONCE IVP  Last administered on 4/2/20at 08:19;  

Start 4/2/20 at 08:15;  Stop 4/2/20 at 08:16;  Status DC


Lidocaine HCl (Buffered Lidocaine 1%) 3 ml STK-MED ONCE .ROUTE ;  Start 4/2/20 

at 08:39;  Stop 4/2/20 at 08:39;  Status DC


Lidocaine HCl (Buffered Lidocaine 1%) 6 ml 1X  ONCE INJ  Last administered on 

4/2/20at 09:05;  Start 4/2/20 at 09:00;  Stop 4/2/20 at 09:06;  Status DC


Sodium Chloride 90 meq/Potassium Chloride 15 meq/ Potassium Phosphate 18 mmol/ 

Magnesium Sulfate 8 meq/Calcium Gluconate 15 meq/ Multivitamins 10 ml/Chromium/ 

Copper/Manganese/ Seleni/Zn 0.5 ml/ Insulin Human Regular 20 unit/ Total 

Parenteral Nutrition/Amino Acids/Dextrose/ Fat Emulsion Intravenous 1,400 ml @  

58.333 mls/ hr TPN  CONT IV  Last administered on 4/2/20at 22:45;  Start 4/2/20 

at 22:00;  Stop 4/3/20 at 21:59;  Status DC


Sodium Chloride 1,000 ml @  1,000 mls/hr Q1H PRN IV hypotension;  Start 4/3/20 

at 07:30;  Stop 4/3/20 at 13:29;  Status DC


Albumin Human 200 ml @  200 mls/hr 1X PRN  PRN IV Hypotension Last administered 

on 4/3/20at 09:36;  Start 4/3/20 at 07:30;  Stop 4/3/20 at 13:29;  Status DC


Sodium Chloride (Normal Saline Flush) 10 ml 1X PRN  PRN IV AP catheter pack;  

Start 4/3/20 at 07:30;  Stop 4/3/20 at 21:29;  Status DC


Sodium Chloride (Normal Saline Flush) 10 ml 1X PRN  PRN IV  catheter pack;  

Start 4/3/20 at 07:30;  Stop 4/4/20 at 07:29;  Status DC


Sodium Chloride 1,000 ml @  400 mls/hr Q2H30M PRN IV PATENCY;  Start 4/3/20 at 

07:30;  Stop 4/3/20 at 19:29;  Status DC


Info (PHARMACY MONITORING -- do not chart) 1 each PRN DAILY  PRN MC SEE 

COMMENTS;  Start 4/3/20 at 07:30;  Stop 4/3/20 at 13:02;  Status DC


Info (PHARMACY MONITORING -- do not chart) 1 each PRN DAILY  PRN MC SEE 

COMMENTS;  Start 4/3/20 at 07:30;  Stop 4/5/20 at 12:45;  Status DC


Sodium Chloride 90 meq/Potassium Chloride 15 meq/ Potassium Phosphate 10 mmol/ 

Magnesium Sulfate 8 meq/Calcium Gluconate 15 meq/ Multivitamins 10 ml/Chromium/ 

Copper/Manganese/ Seleni/Zn 0.5 ml/ Insulin Human Regular 25 unit/ Total 

Parenteral Nutrition/Amino Acids/Dextrose/ Fat Emulsion Intravenous 1,400 ml @  

58.333 mls/ hr TPN  CONT IV  Last administered on 4/3/20at 22:19;  Start 4/3/20 

at 22:00;  Stop 4/4/20 at 21:59;  Status DC


Heparin Sodium (Porcine) (Heparin Sodium) 5,000 unit Q12HR SQ  Last administered

on 4/26/20at 08:59;  Start 4/3/20 at 21:00;  Stop 4/26/20 at 10:05;  Status DC


Ondansetron HCl (Zofran) 4 mg PRN Q6HRS  PRN IV NAUSEA/VOMITING;  Start 4/6/20 

at 07:00;  Stop 4/7/20 at 06:59;  Status DC


Fentanyl Citrate (Fentanyl 2ml Vial) 25 mcg PRN Q5MIN  PRN IV MILD PAIN 1-3;  

Start 4/6/20 at 07:00;  Stop 4/7/20 at 06:59;  Status DC


Fentanyl Citrate (Fentanyl 2ml Vial) 50 mcg PRN Q5MIN  PRN IV MODERATE TO SEVERE

PAIN;  Start 4/6/20 at 07:00;  Stop 4/7/20 at 06:59;  Status DC


Ringer's Solution 1,000 ml @  30 mls/hr Q24H IV ;  Start 4/6/20 at 07:00;  Stop 

4/6/20 at 18:59;  Status DC


Lidocaine HCl (Xylocaine-Mpf 1% 2ml Vial) 2 ml PRN 1X  PRN ID PRIOR TO IV START;

 Start 4/6/20 at 07:00;  Stop 4/7/20 at 06:59;  Status DC


Prochlorperazine Edisylate (Compazine) 5 mg PACU PRN  PRN IV NAUSEA, MRX1;  

Start 4/6/20 at 07:00;  Stop 4/7/20 at 06:59;  Status DC


Sodium Chloride 1,000 ml @  1,000 mls/hr Q1H PRN IV hypotension;  Start 4/4/20 

at 09:10;  Stop 4/4/20 at 15:09;  Status DC


Albumin Human 200 ml @  200 mls/hr 1X PRN  PRN IV Hypotension Last administered 

on 4/4/20at 10:10;  Start 4/4/20 at 09:15;  Stop 4/4/20 at 15:14;  Status DC


Sodium Chloride 1,000 ml @  400 mls/hr Q2H30M PRN IV PATENCY;  Start 4/4/20 at 

09:10;  Stop 4/4/20 at 21:09;  Status DC


Info (PHARMACY MONITORING -- do not chart) 1 each PRN DAILY  PRN MC SEE 

COMMENTS;  Start 4/4/20 at 09:15;  Stop 4/5/20 at 12:45;  Status DC


Info (PHARMACY MONITORING -- do not chart) 1 each PRN DAILY  PRN MC SEE 

COMMENTS;  Start 4/4/20 at 09:15;  Stop 4/5/20 at 12:45;  Status DC


Sodium Chloride 90 meq/Potassium Chloride 15 meq/ Potassium Phosphate 10 mmol/ 

Magnesium Sulfate 8 meq/Calcium Gluconate 15 meq/ Multivitamins 10 ml/Chromium/ 

Copper/Manganese/ Seleni/Zn 0.5 ml/ Insulin Human Regular 25 unit/ Total Lisa

ral Nutrition/Amino Acids/Dextrose/ Fat Emulsion Intravenous 1,400 ml @  58.333 

mls/ hr TPN  CONT IV  Last administered on 4/4/20at 22:10;  Start 4/4/20 at 

22:00;  Stop 4/5/20 at 21:59;  Status DC


Magnesium Sulfate 50 ml @ 25 mls/hr PRN DAILY  PRN IV for Mag < 1.7 on am labs 

Last administered on 6/18/20at 10:57;  Start 4/5/20 at 09:15


Sodium Chloride 90 meq/Potassium Chloride 15 meq/ Potassium Phosphate 10 mmol/ 

Magnesium Sulfate 8 meq/Calcium Gluconate 15 meq/ Multivitamins 10 ml/Chromium/ 

Copper/Manganese/ Seleni/Zn 0.5 ml/ Insulin Human Regular 25 unit/ Total 

Parenteral Nutrition/Amino Acids/Dextrose/ Fat Emulsion Intravenous 1,400 ml @  

58.333 mls/ hr TPN  CONT IV  Last administered on 4/5/20at 21:20;  Start 4/5/20 

at 22:00;  Stop 4/6/20 at 21:59;  Status DC


Sodium Chloride 1,000 ml @  1,000 mls/hr Q1H PRN IV hypotension;  Start 4/5/20 

at 12:23;  Stop 4/5/20 at 18:22;  Status DC


Albumin Human 200 ml @  200 mls/hr 1X  ONCE IV  Last administered on 4/5/20at 

13:34;  Start 4/5/20 at 12:30;  Stop 4/5/20 at 13:29;  Status DC


Diphenhydramine HCl (Benadryl) 25 mg 1X PRN  PRN IV ITCHING;  Start 4/5/20 at 

12:30;  Stop 4/6/20 at 12:29;  Status DC


Diphenhydramine HCl (Benadryl) 25 mg 1X PRN  PRN IV ITCHING;  Start 4/5/20 at 

12:30;  Stop 4/6/20 at 12:29;  Status DC


Info (PHARMACY MONITORING -- do not chart) 1 each PRN DAILY  PRN MC SEE 

COMMENTS;  Start 4/5/20 at 12:30;  Status Cancel


Bupivacaine HCl/ Epinephrine Bitart (Sensorcain-Epi 0.5%-1:[id number] Mpf) 30 ml 

STK-MED ONCE .ROUTE  Last administered on 4/6/20at 11:44;  Start 4/6/20 at 

11:00;  Stop 4/6/20 at 11:01;  Status DC


Cellulose (Surgicel Fibrillar 1x2) 1 each STK-MED ONCE .ROUTE ;  Start 4/6/20 at

11:00;  Stop 4/6/20 at 11:01;  Status DC


Sodium Chloride 90 meq/Potassium Chloride 15 meq/ Potassium Phosphate 10 mmol/ 

Magnesium Sulfate 12 meq/Calcium Gluconate 15 meq/ Multivitamins 10 ml/Chromium/

Copper/Manganese/ Seleni/Zn 0.5 ml/ Insulin Human Regular 25 unit/ Total 

Parenteral Nutrition/Amino Acids/Dextrose/ Fat Emulsion Intravenous 1,400 ml @  

58.333 mls/ hr TPN  CONT IV  Last administered on 4/6/20at 22:24;  Start 4/6/20 

at 22:00;  Stop 4/7/20 at 21:59;  Status DC


Propofol 20 ml @ As Directed STK-MED ONCE IV ;  Start 4/6/20 at 11:07;  Stop 

4/6/20 at 11:07;  Status DC


Cellulose (Surgicel Hemostat 4x8) 1 each STK-MED ONCE .ROUTE  Last administered 

on 4/6/20at 11:44;  Start 4/6/20 at 11:55;  Stop 4/6/20 at 11:56;  Status DC


Sevoflurane (Ultane) 60 ml STK-MED ONCE IH ;  Start 4/6/20 at 12:46;  Stop 

4/6/20 at 12:46;  Status DC


Sodium Chloride 1,000 ml @  1,000 mls/hr Q1H PRN IV hypotension;  Start 4/6/20 

at 13:51;  Stop 4/6/20 at 19:50;  Status DC


Albumin Human 200 ml @  200 mls/hr 1X PRN  PRN IV Hypotension Last administered 

on 4/6/20at 14:51;  Start 4/6/20 at 14:00;  Stop 4/6/20 at 19:59;  Status DC


Diphenhydramine HCl (Benadryl) 25 mg 1X PRN  PRN IV ITCHING;  Start 4/6/20 at 

14:00;  Stop 4/7/20 at 13:59;  Status DC


Diphenhydramine HCl (Benadryl) 25 mg 1X PRN  PRN IV ITCHING;  Start 4/6/20 at 

14:00;  Stop 4/7/20 at 13:59;  Status DC


Sodium Chloride 1,000 ml @  400 mls/hr Q2H30M PRN IV PATENCY;  Start 4/6/20 at 

13:51;  Stop 4/7/20 at 01:50;  Status DC


Info (PHARMACY MONITORING -- do not chart) 1 each PRN DAILY  PRN MC SEE 

COMMENTS;  Start 4/6/20 at 14:00;  Stop 4/9/20 at 08:16;  Status DC


Heparin Sodium (Porcine) (Hep Lock Adult) 500 unit STK-MED ONCE IVP ;  Start 

4/7/20 at 09:29;  Stop 4/7/20 at 09:30;  Status DC


Sodium Chloride 1,000 ml @  1,000 mls/hr Q1H PRN IV hypotension;  Start 4/7/20 

at 10:43;  Stop 4/7/20 at 16:42;  Status DC


Sodium Chloride 1,000 ml @  400 mls/hr Q2H30M PRN IV PATENCY;  Start 4/7/20 at 

10:43;  Stop 4/7/20 at 22:42;  Status DC


Info (PHARMACY MONITORING -- do not chart) 1 each PRN DAILY  PRN MC SEE 

COMMENTS;  Start 4/7/20 at 10:45;  Status UNV


Info (PHARMACY MONITORING -- do not chart) 1 each PRN DAILY  PRN MC SEE 

COMMENTS;  Start 4/7/20 at 10:45;  Status UNV


Sodium Chloride 90 meq/Potassium Chloride 15 meq/ Magnesium Sulfate 12 

meq/Calcium Gluconate 15 meq/ Multivitamins 10 ml/Chromium/ Copper/Manganese/ 

Seleni/Zn 0.5 ml/ Insulin Human Regular 25 unit/ Total Parenteral 

Nutrition/Amino Acids/Dextrose/ Fat Emulsion Intravenous 1,400 ml @  58.333 mls/

hr TPN  CONT IV  Last administered on 4/7/20at 22:13;  Start 4/7/20 at 22:00;  

Stop 4/8/20 at 21:59;  Status DC


Sodium Chloride 1,000 ml @  1,000 mls/hr Q1H PRN IV hypotension;  Start 4/8/20 

at 07:50;  Stop 4/8/20 at 13:49;  Status DC


Albumin Human 200 ml @  200 mls/hr 1X  ONCE IV ;  Start 4/8/20 at 08:00;  Stop 

4/8/20 at 08:53;  Status DC


Diphenhydramine HCl (Benadryl) 25 mg 1X PRN  PRN IV ITCHING;  Start 4/8/20 at 

08:00;  Stop 4/9/20 at 07:59;  Status DC


Diphenhydramine HCl (Benadryl) 25 mg 1X PRN  PRN IV ITCHING;  Start 4/8/20 at 0

8:00;  Stop 4/9/20 at 07:59;  Status DC


Info (PHARMACY MONITORING -- do not chart) 1 each PRN DAILY  PRN MC SEE 

COMMENTS;  Start 4/8/20 at 08:00;  Stop 4/9/20 at 08:16;  Status DC


Albumin Human 50 ml @ 50 mls/hr 1X  ONCE IV ;  Start 4/8/20 at 08:53;  Stop 

4/8/20 at 08:56;  Status DC


Albumin Human 200 ml @  50 mls/hr PRN 1X  PRN IV HYPOTENSION Last administered 

on 4/14/20at 11:54;  Start 4/8/20 at 09:00;  Stop 5/21/20 at 11:14;  Status DC


Meropenem 500 mg/ Sodium Chloride 50 ml @  100 mls/hr Q12H IV  Last administered

on 4/28/20at 10:45;  Start 4/8/20 at 10:00;  Stop 4/28/20 at 12:37;  Status DC


Sodium Chloride 90 meq/Magnesium Sulfate 12 meq/ Calcium Gluconate 15 meq/ 

Multivitamins 10 ml/Chromium/ Copper/Manganese/ Seleni/Zn 0.5 ml/ Insulin Human 

Regular 25 unit/ Total Parenteral Nutrition/Amino Acids/Dextrose/ Fat Emulsion 

Intravenous 1,400 ml @  58.333 mls/ hr TPN  CONT IV  Last administered on 

4/8/20at 21:41;  Start 4/8/20 at 22:00;  Stop 4/9/20 at 21:59;  Status DC


Sodium Chloride 1,000 ml @  1,000 mls/hr Q1H PRN IV hypotension;  Start 4/9/20 

at 07:58;  Stop 4/9/20 at 13:57;  Status DC


Albumin Human 200 ml @  200 mls/hr 1X PRN  PRN IV Hypotension Last administered 

on 4/9/20at 09:30;  Start 4/9/20 at 08:00;  Stop 4/9/20 at 13:59;  Status DC


Sodium Chloride 1,000 ml @  400 mls/hr Q2H30M PRN IV PATENCY;  Start 4/9/20 at 

07:58;  Stop 4/9/20 at 19:57;  Status DC


Info (PHARMACY MONITORING -- do not chart) 1 each PRN DAILY  PRN MC SEE 

COMMENTS;  Start 4/9/20 at 08:00;  Status Cancel


Info (PHARMACY MONITORING -- do not chart) 1 each PRN DAILY  PRN MC SEE 

COMMENTS;  Start 4/9/20 at 08:15;  Status UNV


Sodium Chloride 90 meq/Potassium Phosphate 5 mmol/ Magnesium Sulfate 12 

meq/Calcium Gluconate 15 meq/ Multivitamins 10 ml/Chromium/ Copper/Manganese/ 

Seleni/Zn 0.5 ml/ Insulin Human Regular 30 unit/ Total Parenteral 

Nutrition/Amino Acids/Dextrose/ Fat Emulsion Intravenous 1,400 ml @  58.333 mls/

hr TPN  CONT IV  Last administered on 4/9/20at 22:08;  Start 4/9/20 at 22:00;  

Stop 4/10/20 at 21:59;  Status DC


Linezolid/Dextrose 300 ml @  300 mls/hr Q12HR IV  Last administered on 4/20/20at

20:40;  Start 4/10/20 at 11:00;  Stop 4/21/20 at 08:10;  Status DC


Sodium Chloride 90 meq/Potassium Phosphate 15 mmol/ Magnesium Sulfate 12 

meq/Calcium Gluconate 15 meq/ Multivitamins 10 ml/Chromium/ Copper/Manganese/ 

Seleni/Zn 0.5 ml/ Insulin Human Regular 30 unit/ Total Parenteral 

Nutrition/Amino Acids/Dextrose/ Fat Emulsion Intravenous 1,400 ml @  58.333 mls/

hr TPN  CONT IV  Last administered on 4/10/20at 21:49;  Start 4/10/20 at 22:00; 

Stop 4/11/20 at 21:59;  Status DC


Sodium Chloride 90 meq/Potassium Phosphate 15 mmol/ Magnesium Sulfate 12 

meq/Calcium Gluconate 15 meq/ Multivitamins 10 ml/Chromium/ Copper/Manganese/ 

Seleni/Zn 0.5 ml/ Insulin Human Regular 40 unit/ Total Parenteral Nutri

tion/Amino Acids/Dextrose/ Fat Emulsion Intravenous 1,400 ml @  58.333 mls/ hr 

TPN  CONT IV  Last administered on 4/11/20at 21:21;  Start 4/11/20 at 22:00;  

Stop 4/12/20 at 21:59;  Status DC


Sodium Chloride 1,000 ml @  1,000 mls/hr Q1H PRN IV hypotension;  Start 4/11/20 

at 13:26;  Stop 4/11/20 at 19:25;  Status DC


Albumin Human 200 ml @  200 mls/hr 1X PRN  PRN IV Hypotension Last administered 

on 4/11/20at 15:00;  Start 4/11/20 at 13:30;  Stop 4/11/20 at 19:29;  Status DC


Sodium Chloride (Normal Saline Flush) 10 ml 1X PRN  PRN IV AP catheter pack;  

Start 4/11/20 at 13:30;  Stop 4/12/20 at 13:29;  Status DC


Sodium Chloride (Normal Saline Flush) 10 ml 1X PRN  PRN IV  catheter pack;  

Start 4/11/20 at 13:30;  Stop 4/12/20 at 13:29;  Status DC


Sodium Chloride 1,000 ml @  400 mls/hr Q2H30M PRN IV PATENCY;  Start 4/11/20 at 

13:26;  Stop 4/12/20 at 01:25;  Status DC


Info (PHARMACY MONITORING -- do not chart) 1 each PRN DAILY  PRN MC SEE 

COMMENTS;  Start 4/11/20 at 13:30;  Stop 4/11/20 at 13:33;  Status DC


Info (PHARMACY MONITORING -- do not chart) 1 each PRN DAILY  PRN MC SEE 

COMMENTS;  Start 4/11/20 at 13:30;  Stop 4/11/20 at 13:34;  Status DC


Sodium Chloride 90 meq/Potassium Phosphate 19 mmol/ Magnesium Sulfate 12 

meq/Calcium Gluconate 15 meq/ Multivitamins 10 ml/Chromium/ Copper/Manganese/ 

Seleni/Zn 0.5 ml/ Insulin Human Regular 40 unit/ Total Parenteral 

Nutrition/Amino Acids/Dextrose/ Fat Emulsion Intravenous 1,400 ml @  58.333 mls/

hr TPN  CONT IV  Last administered on 4/12/20at 21:54;  Start 4/12/20 at 22:00; 

Stop 4/13/20 at 21:59;  Status DC


Sodium Chloride 1,000 ml @  1,000 mls/hr Q1H PRN IV hypotension;  Start 4/13/20 

at 09:35;  Stop 4/13/20 at 15:34;  Status DC


Albumin Human 200 ml @  200 mls/hr 1X PRN  PRN IV Hypotension;  Start 4/13/20 at

09:45;  Stop 4/13/20 at 15:44;  Status DC


Diphenhydramine HCl (Benadryl) 25 mg 1X PRN  PRN IV ITCHING;  Start 4/13/20 at 

09:45;  Stop 4/14/20 at 09:44;  Status DC


Diphenhydramine HCl (Benadryl) 25 mg 1X PRN  PRN IV ITCHING;  Start 4/13/20 at 

09:45;  Stop 4/14/20 at 09:44;  Status DC


Sodium Chloride 1,000 ml @  400 mls/hr Q2H30M PRN IV PATENCY;  Start 4/13/20 at 

09:35;  Stop 4/13/20 at 21:34;  Status DC


Info (PHARMACY MONITORING -- do not chart) 1 each PRN DAILY  PRN MC SEE 

COMMENTS;  Start 4/13/20 at 09:45;  Status Cancel


Sodium Chloride 100 meq/Potassium Phosphate 19 mmol/ Magnesium Sulfate 12 

meq/Calcium Gluconate 15 meq/ Multivitamins 10 ml/Chromium/ Copper/Manganese/ 

Seleni/Zn 0.5 ml/ Insulin Human Regular 40 unit/ Potassium Chloride 20 meq/ 

Total Parenteral Nutrition/Amino Acids/Dextrose/ Fat Emulsion Intravenous 1,400 

ml @  58.333 mls/ hr TPN  CONT IV  Last administered on 4/13/20at 22:02;  Start 

4/13/20 at 22:00;  Stop 4/14/20 at 21:59;  Status DC


Furosemide (Lasix) 40 mg 1X  ONCE IVP  Last administered on 4/13/20at 14:39;  

Start 4/13/20 at 14:30;  Stop 4/13/20 at 14:31;  Status DC


Metronidazole 100 ml @  100 mls/hr Q8HRS IV  Last administered on 4/21/20at 

06:04;  Start 4/14/20 at 10:00;  Stop 4/21/20 at 08:10;  Status DC


Sodium Chloride 1,000 ml @  1,000 mls/hr Q1H PRN IV hypotension;  Start 4/14/20 

at 08:00;  Stop 4/14/20 at 13:59;  Status DC


Albumin Human 200 ml @  200 mls/hr 1X PRN  PRN IV Hypotension;  Start 4/14/20 at

08:00;  Stop 4/14/20 at 13:59;  Status DC


Sodium Chloride 1,000 ml @  400 mls/hr Q2H30M PRN IV PATENCY;  Start 4/14/20 at 

08:00;  Stop 4/14/20 at 19:59;  Status DC


Info (PHARMACY MONITORING -- do not chart) 1 each PRN DAILY  PRN MC SEE 

COMMENTS;  Start 4/14/20 at 11:30;  Status UNV


Info (PHARMACY MONITORING -- do not chart) 1 each PRN DAILY  PRN MC SEE 

COMMENTS;  Start 4/14/20 at 11:30;  Stop 4/16/20 at 12:13;  Status DC


Sodium Chloride 100 meq/Potassium Phosphate 19 mmol/ Magnesium Sulfate 12 

meq/Calcium Gluconate 15 meq/ Multivitamins 10 ml/Chromium/ Copper/Manganese/ 

Seleni/Zn 0.5 ml/ Insulin Human Regular 40 unit/ Potassium Chloride 20 meq/ 

Total Parenteral Nutrition/Amino Acids/Dextrose/ Fat Emulsion Intravenous 1,400 

ml @  58.333 mls/ hr TPN  CONT IV  Last administered on 4/14/20at 21:52;  Start 

4/14/20 at 22:00;  Stop 4/15/20 at 21:59;  Status DC


Sodium Chloride (Normal Saline Flush) 10 ml QSHIFT  PRN IV AFTER MEDS AND BLOOD 

DRAWS;  Start 4/14/20 at 15:00;  Stop 5/12/20 at 11:27;  Status DC


Sodium Chloride (Normal Saline Flush) 10 ml PRN Q5MIN  PRN IV AFTER MEDS AND 

BLOOD DRAWS;  Start 4/14/20 at 15:00


Sodium Chloride (Normal Saline Flush) 20 ml PRN Q5MIN  PRN IV AFTER MEDS AND 

BLOOD DRAWS;  Start 4/14/20 at 15:00


Sodium Chloride 100 meq/Potassium Phosphate 19 mmol/ Magnesium Sulfate 12 

meq/Calcium Gluconate 15 meq/ Multivitamins 10 ml/Chromium/ Copper/Manganese/ 

Seleni/Zn 0.5 ml/ Insulin Human Regular 40 unit/ Potassium Chloride 20 meq/ 

Total Parenteral Nutrition/Amino Acids/Dextrose/ Fat Emulsion Intravenous 1,400 

ml @  58.333 mls/ hr TPN  CONT IV  Last administered on 4/15/20at 21:20;  Start 

4/15/20 at 22:00;  Stop 4/16/20 at 21:59;  Status DC


Lidocaine HCl (Buffered Lidocaine 1%) 3 ml STK-MED ONCE .ROUTE ;  Start 4/15/20 

at 13:16;  Stop 4/15/20 at 13:16;  Status DC


Lidocaine HCl (Buffered Lidocaine 1%) 6 ml 1X  ONCE INJ  Last administered on 

4/15/20at 13:45;  Start 4/15/20 at 13:30;  Stop 4/15/20 at 13:31;  Status DC


Albumin Human 100 ml @  100 mls/hr 1X  ONCE IV  Last administered on 4/15/20at 

15:41;  Start 4/15/20 at 15:00;  Stop 4/15/20 at 15:59;  Status DC


Albumin Human 50 ml @ 50 mls/hr 1X  ONCE IV  Last administered on 4/15/20at 

15:00;  Start 4/15/20 at 15:00;  Stop 4/15/20 at 15:59;  Status DC


Info (PHARMACY MONITORING -- do not chart) 1 each PRN DAILY  PRN MC SEE 

COMMENTS;  Start 4/16/20 at 11:30;  Status Cancel


Info (PHARMACY MONITORING -- do not chart) 1 each PRN DAILY  PRN MC SEE 

COMMENTS;  Start 4/16/20 at 11:30;  Status UNV


Sodium Chloride 100 meq/Potassium Phosphate 10 mmol/ Magnesium Sulfate 12 

meq/Calcium Gluconate 15 meq/ Multivitamins 10 ml/Chromium/ Copper/Manganese/ 

Seleni/Zn 0.5 ml/ Insulin Human Regular 35 unit/ Potassium Chloride 20 meq/ 

Total Parenteral Nutrition/Amino Acids/Dextrose/ Fat Emulsion Intravenous 1,400 

ml @  58.333 mls/ hr TPN  CONT IV  Last administered on 4/16/20at 22:10;  Start 

4/16/20 at 22:00;  Stop 4/17/20 at 21:59;  Status DC


Sodium Chloride 100 meq/Potassium Phosphate 5 mmol/ Magnesium Sulfate 12 

meq/Calcium Gluconate 15 meq/ Multivitamins 10 ml/Chromium/ Copper/Manganese/ 

Seleni/Zn 0.5 ml/ Insulin Human Regular 35 unit/ Potassium Chloride 20 meq/ 

Total Parenteral Nutrition/Amino Acids/Dextrose/ Fat Emulsion Intravenous 1,400 

ml @  58.333 mls/ hr TPN  CONT IV  Last administered on 4/17/20at 22:59;  Start 

4/17/20 at 22:00;  Stop 4/18/20 at 21:59;  Status DC


Sodium Chloride 1,000 ml @  1,000 mls/hr Q1H PRN IV hypotension;  Start 4/18/20 

at 08:27;  Stop 4/18/20 at 14:26;  Status DC


Albumin Human 200 ml @  200 mls/hr 1X PRN  PRN IV Hypotension Last administered 

on 4/18/20at 09:18;  Start 4/18/20 at 08:30;  Stop 4/18/20 at 14:29;  Status DC


Sodium Chloride 1,000 ml @  400 mls/hr Q2H30M PRN IV PATENCY;  Start 4/18/20 at 

08:27;  Stop 4/18/20 at 20:26;  Status DC


Info (PHARMACY MONITORING -- do not chart) 1 each PRN DAILY  PRN MC SEE 

COMMENTS;  Start 4/18/20 at 08:30;  Status Cancel


Info (PHARMACY MONITORING -- do not chart) 1 each PRN DAILY  PRN MC SEE 

COMMENTS;  Start 4/18/20 at 08:30;  Stop 4/26/20 at 13:10;  Status DC


Sodium Chloride 100 meq/Potassium Chloride 40 meq/ Magnesium Sulfate 15 

meq/Calcium Gluconate 15 meq/ Multivitamins 10 ml/Chromium/ Copper/Manganese/ 

Seleni/Zn 0.5 ml/ Insulin Human Regular 35 unit/ Total Parenteral 

Nutrition/Amino Acids/Dextrose/ Fat Emulsion Intravenous 1,400 ml @  58.333 mls/

hr TPN  CONT IV  Last administered on 4/18/20at 22:00;  Start 4/18/20 at 22:00; 

Stop 4/19/20 at 21:59;  Status DC


Potassium Chloride/Water 100 ml @  100 mls/hr 1X  ONCE IV  Last administered on 

4/18/20at 17:28;  Start 4/18/20 at 14:45;  Stop 4/18/20 at 15:44;  Status DC


Sodium Chloride 100 meq/Potassium Chloride 40 meq/ Magnesium Sulfate 15 

meq/Calcium Gluconate 15 meq/ Multivitamins 10 ml/Chromium/ Copper/Manganese/ 

Seleni/Zn 0.5 ml/ Insulin Human Regular 35 unit/ Total Parenteral Nutri

tion/Amino Acids/Dextrose/ Fat Emulsion Intravenous 1,400 ml @  58.333 mls/ hr 

TPN  CONT IV  Last administered on 4/19/20at 22:46;  Start 4/19/20 at 22:00;  

Stop 4/20/20 at 21:59;  Status DC


Sodium Chloride 100 meq/Potassium Chloride 40 meq/ Magnesium Sulfate 20 

meq/Calcium Gluconate 15 meq/ Multivitamins 10 ml/Chromium/ Copper/Manganese/ 

Seleni/Zn 0.5 ml/ Insulin Human Regular 35 unit/ Total Parenteral 

Nutrition/Amino Acids/Dextrose/ Fat Emulsion Intravenous 1,400 ml @  58.333 mls/

hr TPN  CONT IV  Last administered on 4/20/20at 22:31;  Start 4/20/20 at 22:00; 

Stop 4/21/20 at 21:59;  Status DC


Fentanyl Citrate (Fentanyl 2ml Vial) 50 mcg PRN Q2HR  PRN IVP PAIN Last 

administered on 4/27/20at 13:32;  Start 4/20/20 at 21:00;  Stop 4/28/20 at 

12:53;  Status DC


Fentanyl Citrate (Fentanyl 2ml Vial) 25 mcg PRN Q2HR  PRN IVP PAIN;  Start 

4/20/20 at 21:00;  Stop 4/28/20 at 12:54;  Status DC


Enoxaparin Sodium (Lovenox 100mg Syringe) 100 mg Q12HR SQ ;  Start 4/21/20 at 

21:00;  Status UNV


Amino Acids/ Glycerin/ Electrolytes 1,000 ml @  75 mls/hr [id number] IV ;  Start 

4/20/20 at 21:15;  Status UNV


Sodium Chloride 1,000 ml @  1,000 mls/hr Q1H PRN IV hypotension;  Start 4/21/20 

at 07:56;  Stop 4/21/20 at 13:55;  Status DC


Albumin Human 200 ml @  200 mls/hr 1X PRN  PRN IV Hypotension Last administered 

on 4/21/20at 08:40;  Start 4/21/20 at 08:00;  Stop 4/21/20 at 13:59;  Status DC


Sodium Chloride 1,000 ml @  400 mls/hr Q2H30M PRN IV PATENCY;  Start 4/21/20 at 

07:56;  Stop 4/21/20 at 19:55;  Status DC


Info (PHARMACY MONITORING -- do not chart) 1 each PRN DAILY  PRN MC SEE 

COMMENTS;  Start 4/21/20 at 08:00;  Status UNV


Info (PHARMACY MONITORING -- do not chart) 1 each PRN DAILY  PRN MC SEE 

COMMENTS;  Start 4/21/20 at 08:00;  Status UNV


Daptomycin 430 mg/ Sodium Chloride 50 ml @  100 mls/hr Q24H IV  Last 

administered on 4/21/20at 12:35;  Start 4/21/20 at 09:00;  Stop 4/21/20 at 

12:49;  Status DC


Sodium Chloride 100 meq/Potassium Chloride 40 meq/ Magnesium Sulfate 20 

meq/Calcium Gluconate 15 meq/ Multivitamins 10 ml/Chromium/ Copper/Manganese/ 

Seleni/Zn 0.5 ml/ Insulin Human Regular 35 unit/ Total Parenteral 

Nutrition/Amino Acids/Dextrose/ Fat Emulsion Intravenous 1,400 ml @  58.333 mls/

hr TPN  CONT IV  Last administered on 4/21/20at 21:26;  Start 4/21/20 at 22:00; 

Stop 4/22/20 at 21:59;  Status DC


Daptomycin 430 mg/ Sodium Chloride 50 ml @  100 mls/hr Q48H IV ;  Start 4/23/20 

at 09:00;  Stop 4/22/20 at 11:55;  Status DC


Sodium Chloride 100 meq/Potassium Chloride 40 meq/ Magnesium Sulfate 20 

meq/Calcium Gluconate 15 meq/ Multivitamins 10 ml/Chromium/ Copper/Manganese/ 

Seleni/Zn 0.5 ml/ Insulin Human Regular 35 unit/ Total Parenteral 

Nutrition/Amino Acids/Dextrose/ Fat Emulsion Intravenous 1,400 ml @  58.333 mls/

hr TPN  CONT IV  Last administered on 4/22/20at 22:27;  Start 4/22/20 at 22:00; 

Stop 4/23/20 at 21:59;  Status DC


Daptomycin 430 mg/ Sodium Chloride 50 ml @  100 mls/hr Q24H IV  Last 

administered on 4/24/20at 15:07;  Start 4/22/20 at 13:00;  Stop 4/25/20 at 

13:15;  Status DC


Sodium Chloride 100 meq/Potassium Chloride 40 meq/ Magnesium Sulfate 20 

meq/Calcium Gluconate 10 meq/ Multivitamins 10 ml/Chromium/ Copper/Manganese/ 

Seleni/Zn 0.5 ml/ Insulin Human Regular 35 unit/ Total Parenteral 

Nutrition/Amino Acids/Dextrose/ Fat Emulsion Intravenous 1,400 ml @  58.333 mls/

hr TPN  CONT IV  Last administered on 4/24/20at 00:06;  Start 4/23/20 at 22:00; 

Stop 4/24/20 at 21:59;  Status DC


Alteplase, Recombinant (Cathflo For Central Catheter Clearance) 1 mg 1X  ONCE 

INT CAT  Last administered on 4/24/20at 11:44;  Start 4/24/20 at 10:45;  Stop 

4/24/20 at 10:46;  Status DC


Ondansetron HCl (Zofran) 4 mg PRN Q6HRS  PRN IV NAUSEA/VOMITING;  Start 4/27/20 

at 07:00;  Stop 4/28/20 at 06:59;  Status DC


Fentanyl Citrate (Fentanyl 2ml Vial) 25 mcg PRN Q5MIN  PRN IV MILD PAIN 1-3;  

Start 4/27/20 at 07:00;  Stop 4/28/20 at 06:59;  Status DC


Fentanyl Citrate (Fentanyl 2ml Vial) 50 mcg PRN Q5MIN  PRN IV MODERATE TO SEVERE

PAIN Last administered on 4/27/20at 10:17;  Start 4/27/20 at 07:00;  Stop 

4/28/20 at 06:59;  Status DC


Ringer's Solution 1,000 ml @  30 mls/hr Q24H IV ;  Start 4/27/20 at 07:00;  Stop

4/27/20 at 18:59;  Status DC


Lidocaine HCl (Xylocaine-Mpf 1% 2ml Vial) 2 ml PRN 1X  PRN ID PRIOR TO IV START;

 Start 4/27/20 at 07:00;  Stop 4/28/20 at 06:59;  Status DC


Prochlorperazine Edisylate (Compazine) 5 mg PACU PRN  PRN IV NAUSEA, MRX1;  

Start 4/27/20 at 07:00;  Stop 4/28/20 at 06:59;  Status DC


Sodium Acetate 50 meq/Potassium Acetate 55 meq/ Magnesium Sulfate 20 meq/Calcium

Gluconate 10 meq/ Multivitamins 10 ml/Chromium/ Copper/Manganese/ Seleni/Zn 0.5 

ml/ Insulin Human Regular 35 unit/ Total Parenteral Nutrition/Amino 

Acids/Dextrose/ Fat Emulsion Intravenous 1,400 ml @  58.333 mls/ hr TPN  CONT IV

;  Start 4/24/20 at 22:00;  Stop 4/24/20 at 14:15;  Status DC


Sodium Acetate 50 meq/Potassium Acetate 55 meq/ Magnesium Sulfate 20 meq/Calcium

Gluconate 10 meq/ Multivitamins 10 ml/Chromium/ Copper/Manganese/ Seleni/Zn 0.5 

ml/ Insulin Human Regular 35 unit/ Total Parenteral Nutrition/Amino 

Acids/Dextrose/ Fat Emulsion Intravenous 1,800 ml @  75 mls/hr TPN  CONT IV  

Last administered on 4/24/20at 22:38;  Start 4/24/20 at 22:00;  Stop 4/25/20 at 

21:59;  Status DC


Sodium Chloride 1,000 ml @  1,000 mls/hr Q1H PRN IV hypotension;  Start 4/24/20 

at 15:31;  Stop 4/24/20 at 21:30;  Status DC


Diphenhydramine HCl (Benadryl) 25 mg 1X PRN  PRN IV ITCHING;  Start 4/24/20 at 

15:45;  Stop 4/25/20 at 15:44;  Status DC


Diphenhydramine HCl (Benadryl) 25 mg 1X PRN  PRN IV ITCHING;  Start 4/24/20 at 

15:45;  Stop 4/25/20 at 15:44;  Status DC


Sodium Chloride 1,000 ml @  400 mls/hr Q2H30M PRN IV PATENCY;  Start 4/24/20 at 

15:31;  Stop 4/25/20 at 03:30;  Status DC


Info (PHARMACY MONITORING -- do not chart) 1 each PRN DAILY  PRN MC SEE 

COMMENTS;  Start 4/24/20 at 15:45;  Stop 5/26/20 at 14:14;  Status DC


Sodium Acetate 50 meq/Potassium Acetate 55 meq/ Magnesium Sulfate 20 meq/Calcium

Gluconate 10 meq/ Multivitamins 10 ml/Chromium/ Copper/Manganese/ Seleni/Zn 0.5 

ml/ Insulin Human Regular 35 unit/ Total Parenteral Nutrition/Amino 

Acids/Dextrose/ Fat Emulsion Intravenous 1,800 ml @  75 mls/hr TPN  CONT IV  

Last administered on 4/25/20at 22:03;  Start 4/25/20 at 22:00;  Stop 4/26/20 at 

21:59;  Status DC


Daptomycin 430 mg/ Sodium Chloride 50 ml @  100 mls/hr Q24H IV  Last 

administered on 4/30/20at 13:00;  Start 4/25/20 at 13:00;  Stop 4/30/20 at 

20:58;  Status DC


Heparin Sodium (Porcine) 1000 unit/Sodium Chloride 1,001 ml @  1,001 mls/hr 1X  

ONCE IRR ;  Start 4/27/20 at 06:00;  Stop 4/27/20 at 06:59;  Status DC


Potassium Acetate 55 meq/Magnesium Sulfate 20 meq/ Calcium Gluconate 10 meq/ 

Multivitamins 10 ml/Chromium/ Copper/Manganese/ Seleni/Zn 0.5 ml/ Insulin Human 

Regular 35 unit/ Total Parenteral Nutrition/Amino Acids/Dextrose/ Fat Emulsion 

Intravenous 1,920 ml @  80 mls/hr TPN  CONT IV  Last administered on 4/26/20at 

22:10;  Start 4/26/20 at 22:00;  Stop 4/27/20 at 21:59;  Status DC


Dexamethasone Sodium Phosphate (Decadron) 4 mg STK-MED ONCE .ROUTE ;  Start 

4/27/20 at 10:56;  Stop 4/27/20 at 10:57;  Status DC


Ondansetron HCl (Zofran) 4 mg STK-MED ONCE .ROUTE ;  Start 4/27/20 at 10:56;  

Stop 4/27/20 at 10:57;  Status DC


Rocuronium Bromide (Zemuron) 50 mg STK-MED ONCE .ROUTE ;  Start 4/27/20 at 

10:56;  Stop 4/27/20 at 10:57;  Status DC


Fentanyl Citrate (Fentanyl 2ml Vial) 100 mcg STK-MED ONCE .ROUTE ;  Start 

4/27/20 at 10:56;  Stop 4/27/20 at 10:57;  Status DC


Bupivacaine HCl/ Epinephrine Bitart (Sensorcain-Epi 0.5%-1:[id number] Mpf) 30 ml 

STK-MED ONCE .ROUTE  Last administered on 4/27/20at 12:01;  Start 4/27/20 at 

10:58;  Stop 4/27/20 at 10:58;  Status DC


Cellulose (Surgicel Hemostat 2x14) 1 each STK-MED ONCE .ROUTE ;  Start 4/27/20 

at 10:58;  Stop 4/27/20 at 10:59;  Status DC


Iohexol (Omnipaque 300 Mg/ml) 50 ml STK-MED ONCE .ROUTE ;  Start 4/27/20 at 

10:58;  Stop 4/27/20 at 10:59;  Status DC


Cellulose (Surgicel Hemostat 4x8) 1 each STK-MED ONCE .ROUTE ;  Start 4/27/20 at

10:58;  Stop 4/27/20 at 10:59;  Status DC


Bisacodyl (Dulcolax Supp) 10 mg STK-MED ONCE .ROUTE ;  Start 4/27/20 at 10:59;  

Stop 4/27/20 at 10:59;  Status DC


Heparin Sodium (Porcine) 1000 unit/Sodium Chloride 1,001 ml @  1,001 mls/hr 1X  

ONCE IRR ;  Start 4/27/20 at 12:00;  Stop 4/27/20 at 12:59;  Status DC


Propofol 20 ml @ As Directed STK-MED ONCE IV ;  Start 4/27/20 at 11:05;  Stop 

4/27/20 at 11:05;  Status DC


Sevoflurane (Ultane) 90 ml STK-MED ONCE IH ;  Start 4/27/20 at 11:05;  Stop 

4/27/20 at 11:05;  Status DC


Sevoflurane (Ultane) 60 ml STK-MED ONCE IH ;  Start 4/27/20 at 12:26;  Stop 

4/27/20 at 12:27;  Status DC


Propofol 20 ml @ As Directed STK-MED ONCE IV ;  Start 4/27/20 at 12:26;  Stop 

4/27/20 at 12:27;  Status DC


Phenylephrine HCl (PHENYLEPHRINE in 0.9% NACL PF) 1 mg STK-MED ONCE IV ;  Start 

4/27/20 at 12:34;  Stop 4/27/20 at 12:34;  Status DC


Heparin Sodium (Porcine) (Heparin Sodium) 5,000 unit Q12HR SQ  Last administered

on 5/6/20at 20:57;  Start 4/27/20 at 21:00;  Stop 5/7/20 at 09:59;  Status DC


Sodium Chloride (Normal Saline Flush) 3 ml QSHIFT  PRN IV AFTER MEDS AND BLOOD 

DRAWS;  Start 4/27/20 at 13:45


Naloxone HCl (Narcan) 0.4 mg PRN Q2MIN  PRN IV SEE INSTRUCTIONS Last 

administered on 6/6/20at 15:15;  Start 4/27/20 at 13:45


Sodium Chloride 1,000 ml @  25 mls/hr Q24H IV  Last administered on 5/26/20at 

13:37;  Start 4/27/20 at 13:37;  Stop 5/29/20 at 13:09;  Status DC


Naloxone HCl (Narcan) 0.4 mg PRN Q2MIN  PRN IV SEE INSTRUCTIONS;  Start 4/27/20 

at 14:30;  Status UNV


Sodium Chloride 1,000 ml @  25 mls/hr Q24H IV ;  Start 4/27/20 at 14:30;  Status

UNV


Hydromorphone HCl 30 ml @ 0 mls/hr CONT PRN  PRN IV PER PROTOCOL Last 

administered on 5/2/20at 16:08;  Start 4/27/20 at 14:30;  Stop 5/4/20 at 08:55; 

Status DC


Potassium Acetate 55 meq/Magnesium Sulfate 20 meq/ Calcium Gluconate 10 meq/ 

Multivitamins 10 ml/Chromium/ Copper/Manganese/ Seleni/Zn 0.5 ml/ Insulin Human 

Regular 35 unit/ Total Parenteral Nutrition/Amino Acids/Dextrose/ Fat Emulsion 

Intravenous 1,920 ml @  80 mls/hr TPN  CONT IV  Last administered on 4/27/20at 

22:01;  Start 4/27/20 at 22:00;  Stop 4/28/20 at 21:59;  Status DC


Bumetanide (Bumex) 2 mg BID92 IV  Last administered on 5/1/20at 13:50;  Start 

4/28/20 at 14:00;  Stop 5/2/20 at 14:10;  Status DC


Meropenem 1 gm/ Sodium Chloride 100 ml @  200 mls/hr Q8HRS IV  Last administered

on 5/22/20at 05:53;  Start 4/28/20 at 14:00;  Stop 5/22/20 at 09:31;  Status DC


Potassium Acetate 55 meq/Magnesium Sulfate 20 meq/ Calcium Gluconate 10 meq/ 

Multivitamins 10 ml/Chromium/ Copper/Manganese/ Seleni/Zn 0.5 ml/ Insulin Human 

Regular 35 unit/ Total Parenteral Nutrition/Amino Acids/Dextrose/ Fat Emulsion 

Intravenous 1,920 ml @  80 mls/hr TPN  CONT IV  Last administered on 4/28/20at 

22:02;  Start 4/28/20 at 22:00;  Stop 4/29/20 at 21:59;  Status DC


Hydromorphone HCl (Dilaudid Standard PCA) 12 mg STK-MED ONCE IV ;  Start 4/27/20

at 14:35;  Stop 4/28/20 at 13:53;  Status DC


Artificial Tears (Artificial Tears) 1 drop PRN Q15MIN  PRN OU DRY EYE Last 

administered on 6/15/20at 03:38;  Start 4/29/20 at 05:30


Hydromorphone HCl (Dilaudid Standard PCA) 12 mg STK-MED ONCE IV ;  Start 4/28/20

at 12:05;  Stop 4/29/20 at 09:15;  Status DC


Potassium Acetate 65 meq/Magnesium Sulfate 20 meq/ Calcium Gluconate 10 meq/ 

Multivitamins 10 ml/Chromium/ Copper/Manganese/ Seleni/Zn 0.5 ml/ Insulin Human 

Regular 30 unit/ Total Parenteral Nutrition/Amino Acids/Dextrose/ Fat Emulsion 

Intravenous 1,920 ml @  80 mls/hr TPN  CONT IV  Last administered on 4/29/20at 

22:22;  Start 4/29/20 at 22:00;  Stop 4/30/20 at 21:59;  Status DC


Cyclobenzaprine HCl (Flexeril) 10 mg PRN Q6HRS  PRN PO MUSCLE SPASMS;  Start 

4/30/20 at 10:45


Potassium Acetate 55 meq/Magnesium Sulfate 20 meq/ Calcium Gluconate 10 meq/ 

Multivitamins 10 ml/Chromium/ Copper/Manganese/ Seleni/Zn 0.5 ml/ Insulin Human 

Regular 30 unit/ Total Parenteral Nutrition/Amino Acids/Dextrose/ Fat Emulsion 

Intravenous 1,920 ml @  80 mls/hr TPN  CONT IV  Last administered on 5/1/20at 

01:00;  Start 4/30/20 at 22:00;  Stop 5/1/20 at 21:59;  Status DC


Magnesium Sulfate 50 ml @ 25 mls/hr 1X  ONCE IV  Last administered on 4/30/20at 

17:18;  Start 4/30/20 at 12:45;  Stop 4/30/20 at 14:44;  Status DC


Potassium Chloride/Water 100 ml @  100 mls/hr 1X  ONCE IV  Last administered on 

5/1/20at 11:27;  Start 5/1/20 at 12:00;  Stop 5/1/20 at 12:59;  Status DC


Hydromorphone HCl (Dilaudid Standard PCA) 12 mg STK-MED ONCE IV ;  Start 4/29/20

at 10:50;  Stop 5/1/20 at 11:02;  Status DC


Hydromorphone HCl (Dilaudid Standard PCA) 12 mg STK-MED ONCE IV ;  Start 4/30/20

at 13:47;  Stop 5/1/20 at 11:03;  Status DC


Potassium Acetate 30 meq/Magnesium Sulfate 20 meq/ Calcium Gluconate 10 meq/ 

Multivitamins 10 ml/Chromium/ Copper/Manganese/ Seleni/Zn 0.5 ml/ Insulin Human 

Regular 30 unit/ Potassium Chloride 30 meq/ Total Parenteral Nutrition/Amino 

Acids/Dextrose/ Fat Emulsion Intravenous 1,920 ml @  80 mls/hr TPN  CONT IV  

Last administered on 5/1/20at 22:34;  Start 5/1/20 at 22:00;  Stop 5/2/20 at 

21:59;  Status DC


Potassium Chloride/Water 100 ml @  100 mls/hr Q1H IV  Last administered on 

5/2/20at 13:05;  Start 5/2/20 at 07:00;  Stop 5/2/20 at 10:59;  Status DC


Magnesium Sulfate 50 ml @ 25 mls/hr 1X  ONCE IV  Last administered on 5/2/20at 

10:34;  Start 5/2/20 at 10:30;  Stop 5/2/20 at 12:29;  Status DC


Potassium Chloride 75 meq/ Magnesium Sulfate 20 meq/Calcium Gluconate 10 meq/ 

Multivitamins 10 ml/Chromium/ Copper/Manganese/ Seleni/Zn 0.5 ml/ Insulin Human 

Regular 30 unit/ Total Parenteral Nutrition/Amino Acids/Dextrose/ Fat Emulsion 

Intravenous 1,920 ml @  80 mls/hr TPN  CONT IV  Last administered on 5/2/20at 

21:51;  Start 5/2/20 at 22:00;  Stop 5/3/20 at 22:00;  Status DC


Potassium Chloride 75 meq/ Magnesium Sulfate 20 meq/Calcium Gluconate 10 meq/ 

Multivitamins 10 ml/Chromium/ Copper/Manganese/ Seleni/Zn 0.5 ml/ Insulin Human 

Regular 25 unit/ Total Parenteral Nutrition/Amino Acids/Dextrose/ Fat Emulsion 

Intravenous 1,920 ml @  80 mls/hr TPN  CONT IV  Last administered on 5/3/20at 

22:04;  Start 5/3/20 at 22:00;  Stop 5/4/20 at 21:59;  Status DC


Hydromorphone HCl (Dilaudid) 0.4 mg PRN Q4HRS  PRN IVP PAIN Last administered on

5/4/20at 10:57;  Start 5/4/20 at 09:00;  Stop 5/4/20 at 18:59;  Status DC


Micafungin Sodium 100 mg/Dextrose 100 ml @  100 mls/hr Q24H IV  Last 

administered on 5/26/20at 12:17;  Start 5/4/20 at 11:00;  Stop 5/27/20 at 09:59;

 Status DC


Daptomycin 485 mg/ Sodium Chloride 50 ml @  100 mls/hr Q24H IV  Last 

administered on 5/11/20at 13:10;  Start 5/4/20 at 11:00;  Stop 5/12/20 at 07:44;

 Status DC


Potassium Chloride 75 meq/ Magnesium Sulfate 15 meq/Calcium Gluconate 8 meq/ 

Multivitamins 10 ml/Chromium/ Copper/Manganese/ Seleni/Zn 0.5 ml/ Insulin Human 

Regular 25 unit/ Total Parenteral Nutrition/Amino Acids/Dextrose/ Fat Emulsion 

Intravenous 1,920 ml @  80 mls/hr TPN  CONT IV  Last administered on 5/4/20at 

23:08;  Start 5/4/20 at 22:00;  Stop 5/5/20 at 21:59;  Status DC


Haloperidol Lactate (Haldol Inj) 3 mg 1X  ONCE IVP  Last administered on 

5/4/20at 14:37;  Start 5/4/20 at 14:30;  Stop 5/4/20 at 14:31;  Status DC


Hydromorphone HCl (Dilaudid) 1 mg PRN Q4HRS  PRN IVP PAIN Last administered on 

5/18/20at 06:25;  Start 5/4/20 at 19:00;  Stop 5/18/20 at 17:10;  Status DC


Potassium Chloride 75 meq/ Magnesium Sulfate 15 meq/Calcium Gluconate 8 meq/ 

Multivitamins 10 ml/Chromium/ Copper/Manganese/ Seleni/Zn 0.5 ml/ Insulin Human 

Regular 20 unit/ Total Parenteral Nutrition/Amino Acids/Dextrose/ Fat Emulsion 

Intravenous 1,920 ml @  80 mls/hr TPN  CONT IV  Last administered on 5/5/20at 22

:10;  Start 5/5/20 at 22:00;  Stop 5/6/20 at 21:59;  Status DC


Lidocaine HCl (Buffered Lidocaine 1%) 3 ml STK-MED ONCE .ROUTE ;  Start 5/6/20 

at 11:31;  Stop 5/6/20 at 11:31;  Status DC


Lidocaine HCl (Buffered Lidocaine 1%) 3 ml STK-MED ONCE .ROUTE ;  Start 5/6/20 

at 12:28;  Stop 5/6/20 at 12:29;  Status DC


Lidocaine HCl (Buffered Lidocaine 1%) 6 ml 1X  ONCE INJ  Last administered on 

5/6/20at 12:53;  Start 5/6/20 at 12:45;  Stop 5/6/20 at 12:46;  Status DC


Potassium Chloride 75 meq/ Magnesium Sulfate 15 meq/Calcium Gluconate 8 meq/ 

Multivitamins 10 ml/Chromium/ Copper/Manganese/ Seleni/Zn 0.5 ml/ Insulin Human 

Regular 20 unit/ Total Parenteral Nutrition/Amino Acids/Dextrose/ Fat Emulsion 

Intravenous 1,920 ml @  80 mls/hr TPN  CONT IV  Last administered on 5/6/20at 

22:00;  Start 5/6/20 at 22:00;  Stop 5/7/20 at 21:59;  Status DC


Potassium Chloride 75 meq/ Magnesium Sulfate 15 meq/Calcium Gluconate 8 meq/ 

Multivitamins 10 ml/Chromium/ Copper/Manganese/ Seleni/Zn 0.5 ml/ Insulin Human 

Regular 15 unit/ Total Parenteral Nutrition/Amino Acids/Dextrose/ Fat Emulsion 

Intravenous 1,920 ml @  80 mls/hr TPN  CONT IV  Last administered on 5/7/20at 

22:28;  Start 5/7/20 at 22:00;  Stop 5/8/20 at 21:59;  Status DC


Vecuronium Bromide (Norcuron Bolus) 6 mg PRN Q6HRS  PRN IV VENT ASYNCHRONY;  

Start 5/7/20 at 19:15;  Stop 5/7/20 at 19:35;  Status DC


Bumetanide (Bumex) 2 mg 1X  ONCE IV  Last administered on 5/7/20at 22:09;  Start

5/7/20 at 19:45;  Stop 5/7/20 at 19:46;  Status DC


Lidocaine HCl (Buffered Lidocaine 1%) 3 ml STK-MED ONCE .ROUTE ;  Start 5/8/20 

at 07:59;  Stop 5/8/20 at 07:59;  Status DC


Midazolam HCl (Versed) 5 mg STK-MED ONCE .ROUTE ;  Start 5/8/20 at 08:36;  Stop 

5/8/20 at 08:36;  Status DC


Fentanyl Citrate (Fentanyl 5ml Vial) 250 mcg STK-MED ONCE .ROUTE ;  Start 5/8/20

at 08:36;  Stop 5/8/20 at 08:37;  Status DC


Lidocaine HCl (Buffered Lidocaine 1%) 3 ml 1X  ONCE IJ  Last administered on 5/ 8/20at 09:30;  Start 5/8/20 at 09:15;  Stop 5/8/20 at 09:16;  Status DC


Midazolam HCl (Versed) 5 mg 1X  ONCE IV  Last administered on 5/8/20at 09:30;  

Start 5/8/20 at 09:15;  Stop 5/8/20 at 09:16;  Status DC


Fentanyl Citrate (Fentanyl 5ml Vial) 250 mcg 1X  ONCE IV  Last administered on 

5/8/20at 09:30;  Start 5/8/20 at 09:15;  Stop 5/8/20 at 09:16;  Status DC


Bumetanide (Bumex) 2 mg DAILY IV  Last administered on 5/18/20at 08:07;  Start 

5/8/20 at 10:00;  Stop 5/18/20 at 17:15;  Status DC


Potassium Chloride 75 meq/ Magnesium Sulfate 15 meq/ Multivitamins 10 

ml/Chromium/ Copper/Manganese/ Seleni/Zn 0.5 ml/ Insulin Human Regular 15 unit/ 

Total Parenteral Nutrition/Amino Acids/Dextrose/ Fat Emulsion Intravenous 1,920 

ml @  80 mls/hr TPN  CONT IV  Last administered on 5/8/20at 21:59;  Start 5/8/20

at 22:00;  Stop 5/9/20 at 21:59;  Status DC


Metoclopramide HCl (Reglan Vial) 10 mg PRN Q3HRS  PRN IVP NAUSEA/VOMITING-3rd 

choice Last administered on 5/14/20at 04:25;  Start 5/9/20 at 16:45


Potassium Chloride 75 meq/ Magnesium Sulfate 15 meq/ Multivitamins 10 

ml/Chromium/ Copper/Manganese/ Seleni/Zn 0.5 ml/ Insulin Human Regular 15 unit/ 

Total Parenteral Nutrition/Amino Acids/Dextrose/ Fat Emulsion Intravenous 1,920 

ml @  80 mls/hr TPN  CONT IV  Last administered on 5/9/20at 22:41;  Start 5/9/20

at 22:00;  Stop 5/10/20 at 21:59;  Status DC


Magnesium Sulfate 50 ml @ 25 mls/hr 1X  ONCE IV  Last administered on 5/10/20at 

10:44;  Start 5/10/20 at 09:00;  Stop 5/10/20 at 10:59;  Status DC


Potassium Chloride/Water 100 ml @  100 mls/hr 1X  ONCE IV  Last administered on 

5/10/20at 09:37;  Start 5/10/20 at 09:00;  Stop 5/10/20 at 09:59;  Status DC


Duloxetine HCl (Cymbalta) 30 mg DAILY PO  Last administered on 5/11/20at 09:48; 

Start 5/10/20 at 14:00;  Stop 5/13/20 at 10:25;  Status DC


Potassium Chloride 80 meq/ Magnesium Sulfate 20 meq/ Multivitamins 10 

ml/Chromium/ Copper/Manganese/ Seleni/Zn 0.5 ml/ Insulin Human Regular 15 unit/ 

Total Parenteral Nutrition/Amino Acids/Dextrose/ Fat Emulsion Intravenous 1,920 

ml @  80 mls/hr TPN  CONT IV  Last administered on 5/10/20at 21:42;  Start 

5/10/20 at 22:00;  Stop 5/11/20 at 21:59;  Status DC


Potassium Chloride 80 meq/ Magnesium Sulfate 20 meq/ Multivitamins 10 

ml/Chromium/ Copper/Manganese/ Seleni/Zn 0.5 ml/ Insulin Human Regular 15 unit/ 

Total Parenteral Nutrition/Amino Acids/Dextrose/ Fat Emulsion Intravenous 1,920 

ml @  80 mls/hr TPN  CONT IV  Last administered on 5/11/20at 22:20;  Start 

5/11/20 at 22:00;  Stop 5/12/20 at 21:59;  Status DC


Lidocaine HCl (Buffered Lidocaine 1%) 3 ml STK-MED ONCE .ROUTE ;  Start 5/12/20 

at 09:54;  Stop 5/12/20 at 09:55;  Status DC


Hydromorphone HCl (Dilaudid Standard PCA) 12 mg STK-MED ONCE IV ;  Start 5/1/20 

at 15:50;  Stop 5/12/20 at 11:24;  Status DC


Potassium Chloride 80 meq/ Magnesium Sulfate 20 meq/ Multivitamins 10 

ml/Chromium/ Copper/Manganese/ Seleni/Zn 0.5 ml/ Insulin Human Regular 15 unit/ 

Total Parenteral Nutrition/Amino Acids/Dextrose/ Fat Emulsion Intravenous 1,920 

ml @  80 mls/hr TPN  CONT IV  Last administered on 5/12/20at 21:40;  Start 

5/12/20 at 22:00;  Stop 5/13/20 at 21:59;  Status DC


Lidocaine HCl (Buffered Lidocaine 1%) 6 ml 1X  ONCE INJ  Last administered on 

5/12/20at 14:15;  Start 5/12/20 at 14:15;  Stop 5/12/20 at 14:16;  Status DC


Potassium Chloride 80 meq/ Magnesium Sulfate 20 meq/ Multivitamins 10 

ml/Chromium/ Copper/Manganese/ Seleni/Zn 1 ml/ Insulin Human Regular 15 unit/ 

Total Parenteral Nutrition/Amino Acids/Dextrose/ Fat Emulsion Intravenous 1,920 

ml @  80 mls/hr TPN  CONT IV  Last administered on 5/13/20at 22:04;  Start 

5/13/20 at 22:00;  Stop 5/14/20 at 21:59;  Status DC


Potassium Chloride/Water 100 ml @  100 mls/hr 1X  ONCE IV  Last administered on 

5/14/20at 11:34;  Start 5/14/20 at 11:00;  Stop 5/14/20 at 11:59;  Status DC


Potassium Chloride 90 meq/ Magnesium Sulfate 20 meq/ Multivitamins 10 

ml/Chromium/ Copper/Manganese/ Seleni/Zn 1 ml/ Insulin Human Regular 15 unit/ 

Total Parenteral Nutrition/Amino Acids/Dextrose/ Fat Emulsion Intravenous 1,920 

ml @  80 mls/hr TPN  CONT IV  Last administered on 5/14/20at 22:57;  Start 

5/14/20 at 22:00;  Stop 5/15/20 at 21:59;  Status DC


Potassium Chloride 90 meq/ Magnesium Sulfate 20 meq/ Multivitamins 10 

ml/Chromium/ Copper/Manganese/ Seleni/Zn 1 ml/ Insulin Human Regular 15 unit/ 

Total Parenteral Nutrition/Amino Acids/Dextrose/ Fat Emulsion Intravenous 1,920 

ml @  80 mls/hr TPN  CONT IV  Last administered on 5/15/20at 22:48;  Start 

5/15/20 at 22:00;  Stop 5/16/20 at 21:59;  Status DC


Potassium Chloride 90 meq/ Magnesium Sulfate 20 meq/ Multivitamins 10 

ml/Chromium/ Copper/Manganese/ Seleni/Zn 1 ml/ Insulin Human Regular 15 unit/ 

Total Parenteral Nutrition/Amino Acids/Dextrose/ Fat Emulsion Intravenous 1,890 

ml @  78.75 mls/ hr TPN  CONT IV  Last administered on 5/16/20at 22:15;  Start 

5/16/20 at 22:00;  Stop 5/17/20 at 21:59;  Status DC


Linezolid/Dextrose 300 ml @  300 mls/hr Q12HR IV  Last administered on 5/19/20at

21:08;  Start 5/17/20 at 09:00;  Stop 5/20/20 at 08:11;  Status DC


Daptomycin 450 mg/ Sodium Chloride 50 ml @  100 mls/hr Q24H IV  Last 

administered on 5/20/20at 09:25;  Start 5/17/20 at 09:00;  Stop 5/21/20 at 

08:30;  Status DC


Potassium Chloride 90 meq/ Magnesium Sulfate 20 meq/ Multivitamins 10 

ml/Chromium/ Copper/Manganese/ Seleni/Zn 1 ml/ Insulin Human Regular 15 unit/ 

Total Parenteral Nutrition/Amino Acids/Dextrose/ Fat Emulsion Intravenous 1,890 

ml @  78.75 mls/ hr TPN  CONT IV  Last administered on 5/17/20at 21:34;  Start 

5/17/20 at 22:00;  Stop 5/18/20 at 21:59;  Status DC


Lorazepam (Ativan Inj) 2 mg STK-MED ONCE .ROUTE ;  Start 5/17/20 at 14:58;  Stop

5/17/20 at 14:58;  Status DC


Metoprolol Tartrate (Lopressor Vial) 5 mg 1X  ONCE IVP  Last administered on 

5/17/20at 15:31;  Start 5/17/20 at 15:15;  Stop 5/17/20 at 15:16;  Status DC


Lorazepam (Ativan Inj) 2 mg 1X  ONCE IVP  Last administered on 5/17/20at 15:30; 

Start 5/17/20 at 15:15;  Stop 5/17/20 at 15:16;  Status DC


Enoxaparin Sodium (Lovenox 40mg Syringe) 40 mg Q24H SQ  Last administered on 

6/5/20at 17:44;  Start 5/17/20 at 17:00;  Stop 6/7/20 at 06:50;  Status DC


Lorazepam (Ativan Inj) 1 mg PRN Q4HRS  PRN IVP ANXIETY / AGITATION MILD-MOD Last

administered on 5/31/20at 15:55;  Start 5/17/20 at 19:15;  Stop 6/2/20 at 11:45;

 Status DC


Lorazepam (Ativan Inj) 2 mg PRN Q4HRS  PRN IVP ANXIETY / AGITATION SEVERE Last 

administered on 6/1/20at 07:55;  Start 5/17/20 at 19:15;  Stop 6/2/20 at 11:45; 

Status DC


Fentanyl Citrate (Fentanyl 2ml Vial) 50 mcg PRN Q4HRS  PRN IVP SEVERE PAIN Last 

administered on 6/13/20at 05:15;  Start 5/18/20 at 13:15;  Stop 6/14/20 at 

09:29;  Status DC


Fentanyl Citrate (Fentanyl 2ml Vial) 25 mcg PRN Q4HRS  PRN IVP MODERATE PAIN 

Last administered on 6/13/20at 00:27;  Start 5/18/20 at 13:15;  Stop 6/14/20 at 

09:30;  Status DC


Potassium Chloride 90 meq/ Magnesium Sulfate 20 meq/ Multivitamins 10 

ml/Chromium/ Copper/Manganese/ Seleni/Zn 1 ml/ Insulin Human Regular 15 unit/ 

Total Parenteral Nutrition/Amino Acids/Dextrose/ Fat Emulsion Intravenous 1,890 

ml @  78.75 mls/ hr TPN  CONT IV  Last administered on 5/18/20at 22:18;  Start 

5/18/20 at 22:00;  Stop 5/19/20 at 21:59;  Status DC


Furosemide (Lasix) 40 mg 1X  ONCE IVP  Last administered on 5/18/20at 21:51;  

Start 5/18/20 at 21:45;  Stop 5/18/20 at 21:48;  Status DC


Albumin Human 100 ml @  100 mls/hr 1X PRN  PRN IV SEE COMMENTS;  Start 5/19/20 

at 01:30


Furosemide (Lasix) 40 mg [id number] IVP  Last administered on 6/3/20at 08:04;  Start 

5/19/20 at 14:00;  Stop 6/3/20 at 13:07;  Status DC


Potassium Chloride 90 meq/ Magnesium Sulfate 20 meq/ Multivitamins 10 

ml/Chromium/ Copper/Manganese/ Seleni/Zn 1 ml/ Insulin Human Regular 15 unit/ 

Total Parenteral Nutrition/Amino Acids/Dextrose/ Fat Emulsion Intravenous 1,800 

ml @  75 mls/hr TPN  CONT IV  Last administered on 5/19/20at 22:31;  Start 

5/19/20 at 22:00;  Stop 5/20/20 at 21:59;  Status DC


Potassium Chloride 90 meq/ Magnesium Sulfate 20 meq/ Multivitamins 10 

ml/Chromium/ Copper/Manganese/ Seleni/Zn 1 ml/ Insulin Human Regular 15 unit/ 

Total Parenteral Nutrition/Amino Acids/Dextrose/ Fat Emulsion Intravenous 1,800 

ml @  75 mls/hr TPN  CONT IV  Last administered on 5/20/20at 22:28;  Start 

5/20/20 at 22:00;  Stop 5/21/20 at 21:59;  Status DC


Potassium Chloride 110 meq/ Magnesium Sulfate 20 meq/ Multivitamins 10 

ml/Chromium/ Copper/Manganese/ Seleni/Zn 1 ml/ Insulin Human Regular 15 unit/ 

Total Parenteral Nutrition/Amino Acids/Dextrose/ Fat Emulsion Intravenous 1,800 

ml @  75 mls/hr TPN  CONT IV  Last administered on 5/21/20at 22:01;  Start 

5/21/20 at 22:00;  Stop 5/22/20 at 21:59;  Status DC


Saliva Substitute (Biotene Moisturizing Mouth) 2 spray PRN Q15MIN  PRN PO DRY 

MOUTH;  Start 5/21/20 at 11:00


Potassium Chloride 110 meq/ Magnesium Sulfate 20 meq/ Multivitamins 10 

ml/Chromium/ Copper/Manganese/ Seleni/Zn 1 ml/ Insulin Human Regular 15 unit/ 

Total Parenteral Nutrition/Amino Acids/Dextrose/ Fat Emulsion Intravenous 1,800 

ml @  75 mls/hr TPN  CONT IV  Last administered on 5/22/20at 22:21;  Start 

5/22/20 at 22:00;  Stop 5/23/20 at 21:59;  Status DC


Potassium Chloride 110 meq/ Magnesium Sulfate 20 meq/ Multivitamins 10 

ml/Chromium/ Copper/Manganese/ Seleni/Zn 1 ml/ Insulin Human Regular 15 unit/ 

Total Parenteral Nutrition/Amino Acids/Dextrose/ Fat Emulsion Intravenous 1,800 

ml @  75 mls/hr TPN  CONT IV  Last administered on 5/23/20at 22:04;  Start 

5/23/20 at 22:00;  Stop 5/24/20 at 21:59;  Status DC


Potassium Chloride 110 meq/ Magnesium Sulfate 20 meq/ Multivitamins 10 

ml/Chromium/ Copper/Manganese/ Seleni/Zn 1 ml/ Insulin Human Regular 15 unit/ 

Total Parenteral Nutrition/Amino Acids/Dextrose/ Fat Emulsion Intravenous 1,800 

ml @  75 mls/hr TPN  CONT IV  Last administered on 5/24/20at 22:48;  Start 

5/24/20 at 22:00;  Stop 5/25/20 at 21:59;  Status DC


Potassium Chloride 70 meq/ Magnesium Sulfate 20 meq/ Multivitamins 10 

ml/Chromium/ Copper/Manganese/ Seleni/Zn 1 ml/ Insulin Human Regular 15 unit/ 

Total Parenteral Nutrition/Amino Acids/Dextrose/ Fat Emulsion Intravenous 1,800 

ml @  75 mls/hr TPN  CONT IV  Last administered on 5/25/20at 21:39;  Start 

5/25/20 at 22:00;  Stop 5/26/20 at 21:59;  Status DC


Meropenem 500 mg/ Sodium Chloride 50 ml @  100 mls/hr Q6HRS IV  Last 

administered on 5/27/20at 06:02;  Start 5/25/20 at 18:00;  Stop 5/27/20 at 

09:59;  Status DC


Barium Sulfate (Varibar Thin Liquid Apple) 148 gm 1X  ONCE PO ;  Start 5/26/20 

at 11:45;  Stop 5/26/20 at 11:49;  Status DC


Potassium Chloride 70 meq/ Magnesium Sulfate 20 meq/ Multivitamins 10 

ml/Chromium/ Copper/Manganese/ Seleni/Zn 1 ml/ Insulin Human Regular 15 unit/ 

Total Parenteral Nutrition/Amino Acids/Dextrose/ Fat Emulsion Intravenous 1,800 

ml @  75 mls/hr TPN  CONT IV  Last administered on 5/26/20at 22:27;  Start 

5/26/20 at 22:00;  Stop 5/27/20 at 21:59;  Status DC


Piperacillin Sod/ Tazobactam Sod 3.375 gm/Sodium Chloride 50 ml @  100 mls/hr 

Q6HRS IV  Last administered on 6/4/20at 06:10;  Start 5/27/20 at 12:00;  Stop 

6/4/20 at 07:26;  Status DC


Potassium Chloride 70 meq/ Magnesium Sulfate 20 meq/ Multivitamins 10 

ml/Chromium/ Copper/Manganese/ Seleni/Zn 1 ml/ Insulin Human Regular 15 unit/ 

Total Parenteral Nutrition/Amino Acids/Dextrose/ Fat Emulsion Intravenous 1,800 

ml @  75 mls/hr TPN  CONT IV  Last administered on 5/27/20at 22:03;  Start 

5/27/20 at 22:00;  Stop 5/28/20 at 21:59;  Status DC


Potassium Chloride 70 meq/ Magnesium Sulfate 20 meq/ Multivitamins 10 

ml/Chromium/ Copper/Manganese/ Seleni/Zn 1 ml/ Insulin Human Regular 15 unit/ 

Total Parenteral Nutrition/Amino Acids/Dextrose/ Fat Emulsion Intravenous 1,800 

ml @  75 mls/hr TPN  CONT IV  Last administered on 5/28/20at 22:33;  Start 

5/28/20 at 22:00;  Stop 5/29/20 at 21:59;  Status DC


Potassium Chloride 70 meq/ Magnesium Sulfate 20 meq/ Multivitamins 10 

ml/Chromium/ Copper/Manganese/ Seleni/Zn 1 ml/ Insulin Human Regular 15 unit/ 

Total Parenteral Nutrition/Amino Acids/Dextrose/ Fat Emulsion Intravenous 1,800 

ml @  75 mls/hr TPN  CONT IV  Last administered on 5/29/20at 23:13;  Start 

5/29/20 at 22:00;  Stop 5/30/20 at 21:59;  Status DC


Potassium Chloride 80 meq/ Magnesium Sulfate 20 meq/ Multivitamins 10 

ml/Chromium/ Copper/Manganese/ Seleni/Zn 1 ml/ Insulin Human Regular 15 unit/ 

Total Parenteral Nutrition/Amino Acids/Dextrose/ Fat Emulsion Intravenous 1,800 

ml @  75 mls/hr TPN  CONT IV  Last administered on 5/30/20at 22:30;  Start 

5/30/20 at 22:00;  Stop 5/31/20 at 21:59;  Status DC


Potassium Chloride 80 meq/ Magnesium Sulfate 20 meq/ Multivitamins 10 

ml/Chromium/ Copper/Manganese/ Seleni/Zn 1 ml/ Insulin Human Regular 15 unit/ 

Total Parenteral Nutrition/Amino Acids/Dextrose/ Fat Emulsion Intravenous 1,800 

ml @  75 mls/hr TPN  CONT IV  Last administered on 5/31/20at 21:54;  Start 

5/31/20 at 22:00;  Stop 6/1/20 at 21:59;  Status DC


Potassium Chloride/Water 100 ml @  100 mls/hr 1X  ONCE IV  Last administered on 

6/1/20at 10:15;  Start 6/1/20 at 10:00;  Stop 6/1/20 at 10:59;  Status DC


Potassium Chloride 90 meq/ Magnesium Sulfate 20 meq/ Multivitamins 10 ml/

Chromium/ Copper/Manganese/ Seleni/Zn 1 ml/ Insulin Human Regular 20 unit/ Total

Parenteral Nutrition/Amino Acids/Dextrose/ Fat Emulsion Intravenous 1,800 ml @  

75 mls/hr TPN  CONT IV  Last administered on 6/1/20at 22:28;  Start 6/1/20 at 

22:00;  Stop 6/2/20 at 21:59;  Status DC


Potassium Chloride 90 meq/ Magnesium Sulfate 20 meq/ Multivitamins 10 

ml/Chromium/ Copper/Manganese/ Seleni/Zn 1 ml/ Insulin Human Regular 20 unit/ 

Total Parenteral Nutrition/Amino Acids/Dextrose/ Fat Emulsion Intravenous 1,800 

ml @  75 mls/hr TPN  CONT IV  Last administered on 6/2/20at 22:08;  Start 6/2/20

at 22:00;  Stop 6/3/20 at 21:59;  Status DC


Lorazepam (Ativan Inj) 0.25 mg PRN Q4HRS  PRN IVP ANXIETY / AGITATION Last 

administered on 6/13/20at 02:25;  Start 6/3/20 at 07:30


Potassium Chloride 90 meq/ Magnesium Sulfate 20 meq/ Multivitamins 10 

ml/Chromium/ Copper/Manganese/ Seleni/Zn 1 ml/ Insulin Human Regular 20 unit/ 

Total Parenteral Nutrition/Amino Acids/Dextrose/ Fat Emulsion Intravenous 1,800 

ml @  75 mls/hr TPN  CONT IV  Last administered on 6/3/20at 23:13;  Start 6/3/20

at 22:00;  Stop 6/4/20 at 21:59;  Status DC


Furosemide (Lasix) 40 mg DAILY IVP  Last administered on 6/5/20at 11:14;  Start 

6/3/20 at 13:30;  Stop 6/7/20 at 09:12;  Status DC


Fluoxetine HCl (PROzac) 20 mg QHS PEG  Last administered on 6/20/20at 20:52;  

Start 6/4/20 at 21:00


Fentanyl (Duragesic 50mcg/ Hr Patch) 1 patch Q72H TD  Last administered on 

6/4/20at 21:22;  Start 6/4/20 at 21:00;  Stop 6/13/20 at 12:00;  Status DC


Potassium Chloride 40 meq/ Potassium Acetate 60 meq/Magnesium Sulfate 10 meq/ 

Multivitamins 10 ml/Chromium/ Copper/Manganese/ Seleni/Zn 1 ml/ Insulin Human 

Regular 20 unit/ Total Parenteral Nutrition/Amino Acids/Dextrose/ Fat Emulsion 

Intravenous 1,800 ml @  75 mls/hr TPN  CONT IV  Last administered on 6/5/20at 

00:03;  Start 6/4/20 at 22:00;  Stop 6/5/20 at 21:59;  Status DC


Potassium Acetate 80 meq/Magnesium Sulfate 5 meq/ Multivitamins 10 ml/Chromium/ 

Copper/Manganese/ Seleni/Zn 1 ml/ Insulin Human Regular 20 unit/ Total 

Parenteral Nutrition/Amino Acids/Dextrose/ Fat Emulsion Intravenous 1,920 ml @  

80 mls/hr TPN  CONT IV  Last administered on 6/5/20at 21:59;  Start 6/5/20 at 

22:00;  Stop 6/6/20 at 21:59;  Status DC


Potassium Acetate 60 meq/Magnesium Sulfate 5 meq/ Multivitamins 10 ml/Chromium/ 

Copper/Manganese/ Seleni/Zn 1 ml/ Insulin Human Regular 30 unit/ Total 

Parenteral Nutrition/Amino Acids/Dextrose/ Fat Emulsion Intravenous 1,920 ml @  

80 mls/hr TPN  CONT IV  Last administered on 6/6/20at 21:54;  Start 6/6/20 at 

22:00;  Stop 6/7/20 at 21:59;  Status DC


Norepinephrine Bitartrate 8 mg/ Dextrose 258 ml @  13.332 mls/ hr CONT  PRN IV 

PER PROTOCOL Last administered on 6/7/20at 21:46;  Start 6/7/20 at 06:30


Albumin Human 500 ml @  125 mls/hr 1X  ONCE IV  Last administered on 6/7/20at 

08:10;  Start 6/7/20 at 08:15;  Stop 6/7/20 at 12:14;  Status DC


Potassium Acetate 40 meq/Magnesium Sulfate 5 meq/ Multivitamins 10 ml/Chromium/ 

Copper/Manganese/ Seleni/Zn 1 ml/ Insulin Human Regular 30 unit/ Total 

Parenteral Nutrition/Amino Acids/Dextrose/ Fat Emulsion Intravenous 1,920 ml @  

80 mls/hr TPN  CONT IV  Last administered on 6/7/20at 22:23;  Start 6/7/20 at 

22:00;  Stop 6/8/20 at 21:59;  Status DC


Meropenem 1 gm/ Sodium Chloride 100 ml @  200 mls/hr Q8HRS IV ;  Start 6/7/20 at

14:00;  Status Cancel


Meropenem 1 gm/ Sodium Chloride 100 ml @  200 mls/hr Q8HRS IV  Last administered

on 6/7/20at 11:04;  Start 6/7/20 at 10:00;  Stop 6/7/20 at 13:00;  Status DC


Meropenem 1 gm/ Sodium Chloride 100 ml @  200 mls/hr Q12HR IV  Last administered

on 6/21/20at 08:51;  Start 6/7/20 at 21:00


Sodium Chloride 1,000 ml @  1,000 mls/hr 1X  ONCE IV  Last administered on 

6/7/20at 11:06;  Start 6/7/20 at 10:45;  Stop 6/7/20 at 11:44;  Status DC


Micafungin Sodium 100 mg/Dextrose 100 ml @  100 mls/hr Q24H IV  Last 

administered on 6/20/20at 13:05;  Start 6/7/20 at 11:00


Daptomycin 410 mg/ Sodium Chloride 50 ml @  100 mls/hr Q24H IV  Last 

administered on 6/9/20at 13:33;  Start 6/7/20 at 14:00;  Stop 6/10/20 at 08:30; 

Status DC


Midazolam HCl (Versed) 2 mg STK-MED ONCE .ROUTE ;  Start 6/7/20 at 14:47;  Stop 

6/7/20 at 14:48;  Status DC


Fentanyl Citrate (Fentanyl 2ml Vial) 100 mcg STK-MED ONCE .ROUTE ;  Start 6/7/20

at 14:47;  Stop 6/7/20 at 14:48;  Status DC


Flumazenil (Romazicon) 0.5 mg STK-MED ONCE IV ;  Start 6/7/20 at 14:48;  Stop 6 /7/20 at 14:48;  Status DC


Naloxone HCl (Narcan) 0.4 mg STK-MED ONCE .ROUTE ;  Start 6/7/20 at 14:48;  Stop

6/7/20 at 14:48;  Status DC


Lidocaine HCl (Lidocaine 1% 20ml Vial) 20 ml STK-MED ONCE .ROUTE ;  Start 6/7/20

at 14:48;  Stop 6/7/20 at 14:48;  Status DC


Midazolam HCl (Versed) 2 mg 1X  ONCE IV  Last administered on 6/7/20at 15:28;  

Start 6/7/20 at 15:00;  Stop 6/7/20 at 15:01;  Status DC


Fentanyl Citrate (Fentanyl 2ml Vial) 100 mcg 1X  ONCE IV  Last administered on 

6/7/20at 15:28;  Start 6/7/20 at 15:00;  Stop 6/7/20 at 15:01;  Status DC


Lidocaine HCl (Lidocaine 1% 20ml Vial) 20 ml 1X  ONCE INJ  Last administered on 

6/7/20at 15:30;  Start 6/7/20 at 15:00;  Stop 6/7/20 at 15:01;  Status DC


Sodium Chloride 1,000 ml @  100 mls/hr Q10H IV  Last administered on 6/16/20at 

07:30;  Start 6/7/20 at 20:00;  Stop 6/16/20 at 11:26;  Status DC


Sodium Bicarbonate (Sodium Bicarb Adult 8.4% Syr) 50 meq 1X  ONCE IV  Last 

administered on 6/7/20at 21:47;  Start 6/7/20 at 22:00;  Stop 6/7/20 at 22:01;  

Status DC


Potassium Acetate 40 meq/Magnesium Sulfate 5 meq/ Multivitamins 10 ml/Chromium/ 

Copper/Manganese/ Seleni/Zn 1 ml/ Insulin Human Regular 30 unit/ Total 

Parenteral Nutrition/Amino Acids/Dextrose/ Fat Emulsion Intravenous 1,920 ml @  

80 mls/hr TPN  CONT IV  Last administered on 6/8/20at 22:28;  Start 6/8/20 at 

22:00;  Stop 6/9/20 at 21:59;  Status DC


Sodium Chloride 500 ml @  500 mls/hr 1X  ONCE IV  Last administered on 6/9/20at 

06:39;  Start 6/9/20 at 06:45;  Stop 6/9/20 at 07:44;  Status DC


Potassium Acetate 40 meq/Magnesium Sulfate 5 meq/ Multivitamins 10 ml/Chromium/ 

Copper/Manganese/ Seleni/Zn 1 ml/ Insulin Human Regular 30 unit/ Total Pare

nteral Nutrition/Amino Acids/Dextrose/ Fat Emulsion Intravenous 1,920 ml @  80 

mls/hr TPN  CONT IV  Last administered on 6/9/20at 22:03;  Start 6/9/20 at 

22:00;  Stop 6/10/20 at 21:59;  Status DC


Metoprolol Tartrate (Lopressor Vial) 5 mg PRN Q6HRS  PRN IVP HYPERTENSION Last 

administered on 6/18/20at 10:14;  Start 6/10/20 at 09:00


Potassium Acetate 40 meq/Magnesium Sulfate 5 meq/ Multivitamins 10 ml/Chromium/ 

Copper/Manganese/ Seleni/Zn 1 ml/ Insulin Human Regular 30 unit/ Total 

Parenteral Nutrition/Amino Acids/Dextrose/ Fat Emulsion Intravenous 1,920 ml @  

80 mls/hr TPN  CONT IV  Last administered on 6/10/20at 21:26;  Start 6/10/20 at 

22:00;  Stop 6/11/20 at 21:59;  Status DC


Potassium Acetate 40 meq/Magnesium Sulfate 5 meq/ Multivitamins 10 ml/Chromium/ 

Copper/Manganese/ Seleni/Zn 1 ml/ Insulin Human Regular 30 unit/ Total 

Parenteral Nutrition/Amino Acids/Dextrose/ Fat Emulsion Intravenous 1,920 ml @  

80 mls/hr TPN  CONT IV  Last administered on 6/11/20at 23:23;  Start 6/11/20 at 

22:00;  Stop 6/12/20 at 21:59;  Status DC


Potassium Acetate 40 meq/Magnesium Sulfate 5 meq/ Multivitamins 10 ml/Chromium/ 

Copper/Manganese/ Seleni/Zn 1 ml/ Insulin Human Regular 30 unit/ Total 

Parenteral Nutrition/Amino Acids/Dextrose/ Fat Emulsion Intravenous 1,920 ml @  

80 mls/hr TPN  CONT IV  Last administered on 6/12/20at 21:35;  Start 6/12/20 at 

22:00;  Stop 6/13/20 at 21:59;  Status DC


Furosemide (Lasix) 20 mg 1X  ONCE IVP  Last administered on 6/13/20at 06:26;  

Start 6/13/20 at 06:15;  Stop 6/13/20 at 06:16;  Status DC


Methylprednisolone Sodium Succinate (SOLU-Medrol 125MG VIAL) 125 mg 1X  ONCE IV 

Last administered on 6/13/20at 06:26;  Start 6/13/20 at 06:15;  Stop 6/13/20 at 

06:16;  Status DC


Albuterol/ Ipratropium (Duoneb) 3 ml Q4HRS NEB  Last administered on 6/21/20at 

07:49;  Start 6/13/20 at 08:00


Fentanyl Citrate 30 ml @ 0 mls/hr CONT  PRN IV SEE PROTOCOL Last administered on

6/21/20at 01:00;  Start 6/13/20 at 06:00


Propofol 100 ml @ 0 mls/hr CONT  PRN IV SEE PROTOCOL Last administered on 

6/20/20at 23:50;  Start 6/13/20 at 06:00


Fentanyl Citrate (Fentanyl 2ml Vial) 25 mcg PRN Q1HR  PRN IV SEE COMMENTS;  

Start 6/13/20 at 06:00


Fentanyl Citrate (Fentanyl 2ml Vial) 50 mcg PRN Q1HR  PRN IV SEE COMMENTS;  

Start 6/13/20 at 06:00


Chlorhexidine Gluconate (Peridex) 15 ml BID MM ;  Start 6/13/20 at 09:00;  Stop 

6/13/20 at 07:58;  Status DC


Potassium Acetate 40 meq/Magnesium Sulfate 5 meq/ Multivitamins 10 ml/Chromium/ 

Copper/Manganese/ Seleni/Zn 1 ml/ Insulin Human Regular 30 unit/ Total 

Parenteral Nutrition/Amino Acids/Dextrose/ Fat Emulsion Intravenous 1,920 ml @  

80 mls/hr TPN  CONT IV  Last administered on 6/13/20at 21:19;  Start 6/13/20 at 

22:00;  Stop 6/14/20 at 21:59;  Status DC


Acetylcysteine (Mucomyst 20% Resp Treatment) 600 mg BID NEB  Last administered 

on 6/19/20at 09:33;  Start 6/13/20 at 21:00;  Stop 6/19/20 at 10:39;  Status DC


Magnesium Sulfate 100 ml @  25 mls/hr 1X  ONCE IV  Last administered on 

6/13/20at 15:48;  Start 6/13/20 at 15:45;  Stop 6/13/20 at 19:44;  Status DC


Potassium Acetate 40 meq/Magnesium Sulfate 5 meq/ Multivitamins 10 ml/Chromium/ 

Copper/Manganese/ Seleni/Zn 1 ml/ Insulin Human Regular 30 unit/ Total 

Parenteral Nutrition/Amino Acids/Dextrose/ Fat Emulsion Intravenous 1,920 ml @  

80 mls/hr TPN  CONT IV  Last administered on 6/14/20at 21:35;  Start 6/14/20 at 

22:00;  Stop 6/15/20 at 21:59;  Status DC


Potassium Chloride/Water 100 ml @  100 mls/hr Q1H IV  Last administered on 

6/15/20at 08:31;  Start 6/15/20 at 07:00;  Stop 6/15/20 at 08:59;  Status DC


Potassium Acetate 40 meq/Magnesium Sulfate 5 meq/ Multivitamins 10 ml/Chromium/ 

Copper/Manganese/ Seleni/Zn 1 ml/ Insulin Human Regular 30 unit/ Total 

Parenteral Nutrition/Amino Acids/Dextrose/ Fat Emulsion Intravenous 1,920 ml @  

80 mls/hr TPN  CONT IV  Last administered on 6/15/20at 21:54;  Start 6/15/20 at 

22:00;  Stop 6/16/20 at 19:34;  Status DC


Lidocaine HCl (Buffered Lidocaine 1%) 3 ml STK-MED ONCE .ROUTE ;  Start 6/15/20 

at 12:14;  Stop 6/15/20 at 12:14;  Status DC


Lidocaine HCl (Buffered Lidocaine 1%) 3 ml 1X  ONCE IJ  Last administered on 

6/15/20at 13:11;  Start 6/15/20 at 13:00;  Stop 6/15/20 at 13:01;  Status DC


Magnesium Sulfate 50 ml @ 25 mls/hr 1X  ONCE IV ;  Start 6/16/20 at 08:15;  Stop

6/16/20 at 10:14;  Status DC


Potassium Acetate 40 meq/Magnesium Sulfate 10 meq/ Multivitamins 10 ml/Chromium/

Copper/Manganese/ Seleni/Zn 1 ml/ Insulin Human Regular 20 unit/ Total 

Parenteral Nutrition/Amino Acids/Dextrose/ Fat Emulsion Intravenous 1,920 ml @  

80 mls/hr TPN  CONT IV  Last administered on 6/16/20at 21:32;  Start 6/16/20 at 

22:00;  Stop 6/17/20 at 21:59;  Status DC


Potassium Chloride/Water 100 ml @  100 mls/hr Q1H IV  Last administered on 

6/17/20at 09:12;  Start 6/17/20 at 08:00;  Stop 6/17/20 at 09:59;  Status DC


Alteplase, Recombinant (Cathflo For Central Catheter Clearance) 4 mg 1X  ONCE 

INT CAT ;  Start 6/17/20 at 09:15;  Stop 6/17/20 at 09:16;  Status UNV


Alteplase, Recombinant (Cathflo For Central Catheter Clearance) 4 mg 1X  ONCE 

INT CAT ;  Start 6/17/20 at 09:15;  Stop 6/17/20 at 09:16;  Status UNV


Alteplase, Recombinant (Cathflo For Central Catheter Clearance) 4 mg 1X  ONCE 

INT CAT ;  Start 6/17/20 at 09:15;  Stop 6/17/20 at 09:16;  Status UNV


Alteplase, Recombinant 4 mg/ Sodium Chloride 20 ml @ 20 mls/hr 1X  ONCE IV  Last

administered on 6/17/20at 10:10;  Start 6/17/20 at 10:00;  Stop 6/17/20 at 

10:59;  Status DC


Alteplase, Recombinant 4 mg/ Sodium Chloride 20 ml @ 20 mls/hr 1X  ONCE IV  Last

administered on 6/17/20at 10:09;  Start 6/17/20 at 10:00;  Stop 6/17/20 at 

10:59;  Status DC


Alteplase, Recombinant 4 mg/ Sodium Chloride 20 ml @ 20 mls/hr 1X  ONCE IV  Last

administered on 6/17/20at 10:09;  Start 6/17/20 at 10:00;  Stop 6/17/20 at 

10:59;  Status DC


Potassium Acetate 60 meq/Magnesium Sulfate 10 meq/ Multivitamins 10 ml/Chromium/

Copper/Manganese/ Seleni/Zn 1 ml/ Insulin Human Regular 20 unit/ Total 

Parenteral Nutrition/Amino Acids/Dextrose/ Fat Emulsion Intravenous 1,920 ml @  

80 mls/hr TPN  CONT IV  Last administered on 6/17/20at 21:55;  Start 6/17/20 at 

22:00;  Stop 6/18/20 at 21:59;  Status DC


Albumin Human 500 ml @  125 mls/hr 1X  ONCE IV  Last administered on 6/18/20at 

12:01;  Start 6/18/20 at 11:15;  Stop 6/18/20 at 15:14;  Status DC


Sodium Chloride 500 ml @  500 mls/hr 1X  ONCE IV  Last administered on 6/18/20at

13:50;  Start 6/18/20 at 11:15;  Stop 6/18/20 at 12:14;  Status DC


Potassium Acetate 60 meq/Magnesium Sulfate 14 meq/ Multivitamins 10 ml/Chromium/

Copper/Manganese/ Seleni/Zn 1 ml/ Insulin Human Regular 20 unit/ Total 

Parenteral Nutrition/Amino Acids/Dextrose/ Fat Emulsion Intravenous 1,920 ml @  

80 mls/hr TPN  CONT IV  Last administered on 6/18/20at 22:26;  Start 6/18/20 at 

22:00;  Stop 6/19/20 at 21:59;  Status DC


Ciprofloxacin/ Dextrose 200 ml @  200 mls/hr Q12HR IV  Last administered on 

6/21/20at 08:50;  Start 6/18/20 at 21:00


Albumin Human 250 ml @  62.5 mls/hr 1X  ONCE IV  Last administered on 6/19/20at 

11:09;  Start 6/19/20 at 11:00;  Stop 6/19/20 at 14:59;  Status DC


Furosemide (Lasix) 20 mg 1X  ONCE IVP  Last administered on 6/19/20at 14:52;  

Start 6/19/20 at 10:45;  Stop 6/19/20 at 10:49;  Status DC


Potassium Acetate 60 meq/Magnesium Sulfate 14 meq/ Multivitamins 10 ml/Chromium/

Copper/Manganese/ Seleni/Zn 1 ml/ Insulin Human Regular 15 unit/ Total 

Parenteral Nutrition/Amino Acids/Dextrose/ Fat Emulsion Intravenous 1,920 ml @  

80 mls/hr TPN  CONT IV  Last administered on 6/19/20at 22:08;  Start 6/19/20 at 

22:00;  Stop 6/20/20 at 21:59;  Status DC


Potassium Acetate 60 meq/Magnesium Sulfate 14 meq/ Multivitamins 10 ml/Chromium/

Copper/Manganese/ Seleni/Zn 1 ml/ Insulin Human Regular 15 unit/ Total 

Parenteral Nutrition/Amino Acids/Dextrose/ Fat Emulsion Intravenous 1,920 ml @  

80 mls/hr TPN  CONT IV  Last administered on 6/20/20at 22:12;  Start 6/20/20 at 

22:00;  Stop 6/21/20 at 21:59


Potassium Acetate 60 meq/Magnesium Sulfate 14 meq/ Multivitamins 10 ml/Chromium/

Copper/Manganese/ Seleni/Zn 1 ml/ Insulin Human Regular 15 unit/ Total 

Parenteral Nutrition/Amino Acids/Dextrose/ Fat Emulsion Intravenous 1,920 ml @  

80 mls/hr TPN  CONT IV ;  Start 6/21/20 at 22:00;  Stop 6/22/20 at 21:59





Active Scripts


Active


Reported


Bisoprolol Fumarate 5 Mg Tablet 10 Mg PO DAILY


Vitals/I & O





Vital Sign - Last 24 Hours








 6/20/20 6/20/20 6/20/20 6/20/20





 12:00 12:00 12:34 14:00


 


Temp 98.9   





 98.9   


 


Pulse 116   114


 


Resp 22   22


 


B/P (MAP) 141/9 (53)   104/58 (73)


 


Pulse Ox 99  99 99


 


O2 Delivery Tracheal Collar Mechanical Ventilator Tracheal Collar Tracheal 

Collar


 


O2 Flow Rate   9.0 


 


    





    





 6/20/20 6/20/20 6/20/20 6/20/20





 14:27 14:42 16:00 16:00


 


Temp 97.9 98.0 97.9 





 97.9 98.0 97.9 


 


Pulse 113 114 110 


 


Resp 20 20 22 


 


B/P (MAP) 109/65 104/58 113/75 (88) 


 


Pulse Ox   99 


 


O2 Delivery   Tracheal Collar Trach Collar


 


    





    





 6/20/20 6/20/20 6/20/20 6/20/20





 16:30 18:00 19:00 20:00


 


Temp 98.5   98.2





 98.5   98.2


 


Pulse 110 116 116 108


 


Resp 22 20 17 16


 


B/P (MAP) 113/75 107/68 (81) 107/65 (79) 98/50 (66)


 


Pulse Ox  99 99 98


 


O2 Delivery  Tracheal Collar Tracheal Collar Tracheal Collar


 


    





    





 6/20/20 6/20/20 6/20/20 6/20/20





 20:00 21:00 22:00 23:00


 


Temp    98.1





    98.1


 


Pulse  112 114 118


 


Resp  27 27 27


 


B/P (MAP)  113/75 (88) 118/78 (91) 116/81 (93)


 


Pulse Ox  97 97 96


 


O2 Delivery Trach Collar Tracheal Collar Tracheal Collar Tracheal Collar


 


    





    





 6/20/20 6/21/20 6/21/20 6/21/20





 23:47 00:00 00:10 01:00


 


Pulse  108  


 


Resp  27  16


 


B/P (MAP)  99/61 (74)  


 


Pulse Ox 98 99  98


 


O2 Delivery Ventilator Ventilator Mechanical Ventilator 





 6/21/20 6/21/20 6/21/20 6/21/20





 01:00 02:00 03:00 04:00


 


Temp    100.3





    100.3


 


Pulse 100 106 102 98


 


Resp 27 27 27 22


 


B/P (MAP) 96/78 (84) 91/48 (62) 87/63 (71) 95/57 (70)


 


Pulse Ox 99 100 100 100


 


O2 Delivery Ventilator Ventilator Ventilator Ventilator


 


    





    





 6/21/20 6/21/20 6/21/20 6/21/20





 04:15 04:19 05:00 06:00


 


Pulse   108 110


 


Resp   19 20


 


B/P (MAP)   95/45 (62) 93/50 (64)


 


Pulse Ox  98 97 99


 


O2 Delivery Mechanical Ventilator Ventilator Ventilator Ventilator





 6/21/20 6/21/20 6/21/20 6/21/20





 08:00 08:00 08:02 10:00


 


Temp  100.1  





  100.1  


 


Pulse  114  121


 


Resp  20  21


 


B/P (MAP)  138/68 (91)  102/51 (68)


 


Pulse Ox  99 96 98


 


O2 Delivery Mechanical Ventilator Ventilator Tracheal Collar Ventilator


 


O2 Flow Rate   5.0 














Intake and Output   


 


 6/20/20 6/20/20 6/21/20





 15:00 23:00 07:00


 


Intake Total 408.5 ml 1561 ml 925 ml


 


Output Total 750 ml 1428 ml 530 ml


 


Balance -341.5 ml 133 ml 395 ml











Hemodynamically unstable?:  No


Is patient in severe pain?:  No


Is NPO status required?:  No











OVIDIO SARAVIA MD          Jun 21, 2020 11:12

## 2020-06-21 NOTE — PDOC
SURGICAL PROGRESS NOTE


Subjective


wakens


makes eye contact


answers questions appropriately


adequate pain control


Vital Signs





Vital Signs








  Date Time  Temp Pulse Resp B/P (MAP) Pulse Ox O2 Delivery O2 Flow Rate FiO2


 


6/21/20 10:00  121 21 102/51 (68) 98 Ventilator  


 


6/21/20 08:02       5.0 


 


6/21/20 08:00 100.1       





 100.1       








I&O











Intake and Output 


 


 6/21/20





 07:00


 


Intake Total 2894.5 ml


 


Output Total 2708 ml


 


Balance 186.5 ml


 


 


 


IV Total 2594.5 ml


 


Blood Product 300 ml


 


Output Urine Total 2135 ml


 


Chest Tube Drainage Total 210 ml


 


Drainage Total 13 ml


 


Other 350 ml








PATIENT HAS A VILLASENOR:  Yes


General:  Alert


Abdomen:  Soft, Other (mildly TTP)


Labs





Laboratory Tests








Test


 6/19/20


12:25 6/19/20


14:43 6/19/20


23:40 6/20/20


05:50


 


Glucose (Fingerstick)


 185 mg/dL


(70-99) 


 147 mg/dL


(70-99) 





 


Ionized Calcium


 


 1.50 mmol/L


(1.13-1.32) 


 





 


Iron Level


 


 8 ug/dL


() 


 





 


Total Iron Binding Capacity


 


 56 ug/dL


(250-450) 


 





 


Iron Saturation  14 % (15-34)   


 


White Blood Count


 


 


 


 7.7 x10^3/uL


(4.0-11.0)


 


Red Blood Count


 


 


 


 2.76 x10^6/uL


(3.50-5.40)


 


Hemoglobin


 


 


 


 7.8 g/dL


(12.0-15.5)


 


Hematocrit


 


 


 


 23.6 %


(36.0-47.0)


 


Mean Corpuscular Volume    85 fL () 


 


Mean Corpuscular Hemoglobin    28 pg (25-35) 


 


Mean Corpuscular Hemoglobin


Concent 


 


 


 33 g/dL


(31-37)


 


Red Cell Distribution Width


 


 


 


 18.2 %


(11.5-14.5)


 


Platelet Count


 


 


 


 479 x10^3/uL


(140-400)


 


Neutrophils (%) (Auto)    74 % (31-73) 


 


Lymphocytes (%) (Auto)    16 % (24-48) 


 


Monocytes (%) (Auto)    9 % (0-9) 


 


Eosinophils (%) (Auto)    2 % (0-3) 


 


Basophils (%) (Auto)    0 % (0-3) 


 


Neutrophils # (Auto)


 


 


 


 5.6 x10^3/uL


(1.8-7.7)


 


Lymphocytes # (Auto)


 


 


 


 1.2 x10^3/uL


(1.0-4.8)


 


Monocytes # (Auto)


 


 


 


 0.7 x10^3/uL


(0.0-1.1)


 


Eosinophils # (Auto)


 


 


 


 0.1 x10^3/uL


(0.0-0.7)


 


Basophils # (Auto)


 


 


 


 0.0 x10^3/uL


(0.0-0.2)


 


Sodium Level


 


 


 


 137 mmol/L


(136-145)


 


Potassium Level


 


 


 


 3.8 mmol/L


(3.5-5.1)


 


Chloride Level


 


 


 


 103 mmol/L


()


 


Carbon Dioxide Level


 


 


 


 28 mmol/L


(21-32)


 


Anion Gap    6 (6-14) 


 


Blood Urea Nitrogen


 


 


 


 17 mg/dL


(7-20)


 


Creatinine


 


 


 


 0.6 mg/dL


(0.6-1.0)


 


Estimated GFR


(Cockcroft-Gault) 


 


 


 106.3 





 


BUN/Creatinine Ratio    28 (6-20) 


 


Glucose Level


 


 


 


 134 mg/dL


(70-99)


 


Calcium Level


 


 


 


 9.4 mg/dL


(8.5-10.1)


 


Total Bilirubin


 


 


 


 0.4 mg/dL


(0.2-1.0)


 


Aspartate Amino Transf


(AST/SGOT) 


 


 


 17 U/L (15-37) 





 


Alanine Aminotransferase


(ALT/SGPT) 


 


 


 18 U/L (14-59) 





 


Alkaline Phosphatase


 


 


 


 101 U/L


()


 


Total Protein


 


 


 


 4.4 g/dL


(6.4-8.2)


 


Albumin


 


 


 


 1.1 g/dL


(3.4-5.0)


 


Albumin/Globulin Ratio    0.3 (1.0-1.7) 


 


Test


 6/20/20


05:55 6/20/20


13:03 6/20/20


23:59 6/21/20


05:15


 


Glucose (Fingerstick)


 131 mg/dL


(70-99) 170 mg/dL


(70-99) 132 mg/dL


(70-99) 





 


White Blood Count


 


 


 


 10.3 x10^3/uL


(4.0-11.0)


 


Red Blood Count


 


 


 


 3.10 x10^6/uL


(3.50-5.40)


 


Hemoglobin


 


 


 


 9.0 g/dL


(12.0-15.5)


 


Hematocrit


 


 


 


 26.3 %


(36.0-47.0)


 


Mean Corpuscular Volume    85 fL () 


 


Mean Corpuscular Hemoglobin    29 pg (25-35) 


 


Mean Corpuscular Hemoglobin


Concent 


 


 


 34 g/dL


(31-37)


 


Red Cell Distribution Width


 


 


 


 17.4 %


(11.5-14.5)


 


Platelet Count


 


 


 


 448 x10^3/uL


(140-400)


 


Neutrophils (%) (Auto)    74 % (31-73) 


 


Lymphocytes (%) (Auto)    17 % (24-48) 


 


Monocytes (%) (Auto)    7 % (0-9) 


 


Eosinophils (%) (Auto)    1 % (0-3) 


 


Basophils (%) (Auto)    0 % (0-3) 


 


Neutrophils # (Auto)


 


 


 


 7.7 x10^3/uL


(1.8-7.7)


 


Lymphocytes # (Auto)


 


 


 


 1.8 x10^3/uL


(1.0-4.8)


 


Monocytes # (Auto)


 


 


 


 0.8 x10^3/uL


(0.0-1.1)


 


Eosinophils # (Auto)


 


 


 


 0.1 x10^3/uL


(0.0-0.7)


 


Basophils # (Auto)


 


 


 


 0.0 x10^3/uL


(0.0-0.2)


 


Sodium Level


 


 


 


 136 mmol/L


(136-145)


 


Potassium Level


 


 


 


 4.0 mmol/L


(3.5-5.1)


 


Chloride Level


 


 


 


 102 mmol/L


()


 


Carbon Dioxide Level


 


 


 


 29 mmol/L


(21-32)


 


Anion Gap    5 (6-14) 


 


Blood Urea Nitrogen


 


 


 


 15 mg/dL


(7-20)


 


Creatinine


 


 


 


 0.6 mg/dL


(0.6-1.0)


 


Estimated GFR


(Cockcroft-Gault) 


 


 


 106.3 





 


BUN/Creatinine Ratio    25 (6-20) 


 


Glucose Level


 


 


 


 133 mg/dL


(70-99)


 


Calcium Level


 


 


 


 9.8 mg/dL


(8.5-10.1)


 


Total Bilirubin


 


 


 


 0.4 mg/dL


(0.2-1.0)


 


Aspartate Amino Transf


(AST/SGOT) 


 


 


 19 U/L (15-37) 





 


Alanine Aminotransferase


(ALT/SGPT) 


 


 


 15 U/L (14-59) 





 


Alkaline Phosphatase


 


 


 


 113 U/L


()


 


Total Protein


 


 


 


 4.3 g/dL


(6.4-8.2)


 


Albumin


 


 


 


 1.0 g/dL


(3.4-5.0)


 


Albumin/Globulin Ratio    0.3 (1.0-1.7) 








Laboratory Tests








Test


 6/20/20


13:03 6/20/20


23:59 6/21/20


05:15


 


Glucose (Fingerstick)


 170 mg/dL


(70-99) 132 mg/dL


(70-99) 





 


White Blood Count


 


 


 10.3 x10^3/uL


(4.0-11.0)


 


Red Blood Count


 


 


 3.10 x10^6/uL


(3.50-5.40)


 


Hemoglobin


 


 


 9.0 g/dL


(12.0-15.5)


 


Hematocrit


 


 


 26.3 %


(36.0-47.0)


 


Mean Corpuscular Volume   85 fL () 


 


Mean Corpuscular Hemoglobin   29 pg (25-35) 


 


Mean Corpuscular Hemoglobin


Concent 


 


 34 g/dL


(31-37)


 


Red Cell Distribution Width


 


 


 17.4 %


(11.5-14.5)


 


Platelet Count


 


 


 448 x10^3/uL


(140-400)


 


Neutrophils (%) (Auto)   74 % (31-73) 


 


Lymphocytes (%) (Auto)   17 % (24-48) 


 


Monocytes (%) (Auto)   7 % (0-9) 


 


Eosinophils (%) (Auto)   1 % (0-3) 


 


Basophils (%) (Auto)   0 % (0-3) 


 


Neutrophils # (Auto)


 


 


 7.7 x10^3/uL


(1.8-7.7)


 


Lymphocytes # (Auto)


 


 


 1.8 x10^3/uL


(1.0-4.8)


 


Monocytes # (Auto)


 


 


 0.8 x10^3/uL


(0.0-1.1)


 


Eosinophils # (Auto)


 


 


 0.1 x10^3/uL


(0.0-0.7)


 


Basophils # (Auto)


 


 


 0.0 x10^3/uL


(0.0-0.2)


 


Sodium Level


 


 


 136 mmol/L


(136-145)


 


Potassium Level


 


 


 4.0 mmol/L


(3.5-5.1)


 


Chloride Level


 


 


 102 mmol/L


()


 


Carbon Dioxide Level


 


 


 29 mmol/L


(21-32)


 


Anion Gap   5 (6-14) 


 


Blood Urea Nitrogen


 


 


 15 mg/dL


(7-20)


 


Creatinine


 


 


 0.6 mg/dL


(0.6-1.0)


 


Estimated GFR


(Cockcroft-Gault) 


 


 106.3 





 


BUN/Creatinine Ratio   25 (6-20) 


 


Glucose Level


 


 


 133 mg/dL


(70-99)


 


Calcium Level


 


 


 9.8 mg/dL


(8.5-10.1)


 


Total Bilirubin


 


 


 0.4 mg/dL


(0.2-1.0)


 


Aspartate Amino Transf


(AST/SGOT) 


 


 19 U/L (15-37) 





 


Alanine Aminotransferase


(ALT/SGPT) 


 


 15 U/L (14-59) 





 


Alkaline Phosphatase


 


 


 113 U/L


()


 


Total Protein


 


 


 4.3 g/dL


(6.4-8.2)


 


Albumin


 


 


 1.0 g/dL


(3.4-5.0)


 


Albumin/Globulin Ratio   0.3 (1.0-1.7) 








Problem List


Problems


Medical Problems:


(1) Acute pancreatitis


Status: Acute  





(2) Cholelithiasis


Status: Acute  








Assessment/Plan


pancreatitis








continue supportive care





Justicifation of Admission Dx:


Justifications for Admission:


Justification of Admission Dx:  Yes











KIEL BAL MD                Jun 21, 2020 11:26

## 2020-06-21 NOTE — PDOC
PROGRESS NOTES


Subjective


Subjective


Awake, no distress.  Patient on trach shield and vent on and off per pulmonary 

team





Objective


Objective





Vital Signs








  Date Time  Temp Pulse Resp B/P (MAP) Pulse Ox O2 Delivery O2 Flow Rate FiO2


 


6/21/20 12:03     96  5.0 


 


6/21/20 11:31      Tracheal Collar  


 


6/21/20 10:00  121 21 102/51 (68)    


 


6/21/20 08:00 100.1       





 100.1       














Intake and Output 


 


 6/21/20





 07:00


 


Intake Total 2894.5 ml


 


Output Total 2708 ml


 


Balance 186.5 ml


 


 


 


IV Total 2594.5 ml


 


Blood Product 300 ml


 


Output Urine Total 2135 ml


 


Chest Tube Drainage Total 210 ml


 


Drainage Total 13 ml


 


Other 350 ml











Physical Exam


Abdomen:  Soft, Other (mildly TTP)


Heart:  Regular rate (SR/ST), Other (distant heart sounds)


Extremities:  No cyanosis, Other (3+ bilateral LE pitting edema)


General:  Alert


HEENT:  Other (s/p tracheostomy)


Lungs:  Other (diminished bases, trach with trach shield, chest tube in place 

with serous fluid)


Neuro:  Other (nonverbal due to trach)


Psych/Mental Status:  Other (flat affect)


Skin:  No rashes, No significant lesion





Diagnosis


RENAL FAILURE:  Acute (Acute tubular necrosis)





Assessment


Assessment


1. Severe acute gallstone pancreatitis with necrosis:  Treat per [facility] team


2. Acute respiratory failure: presently has trach and chest tube.  CT drainage 

210 cc/24 hrs.  Pulmonary team following


3. Anasarca with severe protein malnutrition


4. Sepsis with MDRO: off pressors. BP low marginal but stable


5. Acute diastolic CHF: multifactorial, better compensated


6. Normocytic anemia: s/p transfusions


7. Sinus tachycardia: Most probably physiologic


8. Severe JED requiring HD: last dialysis about a month ago





Plan


Plan of Care


Problems


Medical Problems:


(1) Acute pancreatitis


Status: Acute  





(2) Cholelithiasis


Status: Acute  











Comment


Review of Relevant


I have reviewed the following items josy (where applicable) has been applied.


Labs





Laboratory Tests








Test


 6/20/20


23:59 6/21/20


05:15 6/21/20


11:51


 


Glucose (Fingerstick)


 132 mg/dL


(70-99) 


 171 mg/dL


(70-99)


 


White Blood Count


 


 10.3 x10^3/uL


(4.0-11.0) 





 


Red Blood Count


 


 3.10 x10^6/uL


(3.50-5.40) 





 


Hemoglobin


 


 9.0 g/dL


(12.0-15.5) 





 


Hematocrit


 


 26.3 %


(36.0-47.0) 





 


Mean Corpuscular Volume  85 fL ()  


 


Mean Corpuscular Hemoglobin  29 pg (25-35)  


 


Mean Corpuscular Hemoglobin


Concent 


 34 g/dL


(31-37) 





 


Red Cell Distribution Width


 


 17.4 %


(11.5-14.5) 





 


Platelet Count


 


 448 x10^3/uL


(140-400) 





 


Neutrophils (%) (Auto)  74 % (31-73)  


 


Lymphocytes (%) (Auto)  17 % (24-48)  


 


Monocytes (%) (Auto)  7 % (0-9)  


 


Eosinophils (%) (Auto)  1 % (0-3)  


 


Basophils (%) (Auto)  0 % (0-3)  


 


Neutrophils # (Auto)


 


 7.7 x10^3/uL


(1.8-7.7) 





 


Lymphocytes # (Auto)


 


 1.8 x10^3/uL


(1.0-4.8) 





 


Monocytes # (Auto)


 


 0.8 x10^3/uL


(0.0-1.1) 





 


Eosinophils # (Auto)


 


 0.1 x10^3/uL


(0.0-0.7) 





 


Basophils # (Auto)


 


 0.0 x10^3/uL


(0.0-0.2) 





 


Sodium Level


 


 136 mmol/L


(136-145) 





 


Potassium Level


 


 4.0 mmol/L


(3.5-5.1) 





 


Chloride Level


 


 102 mmol/L


() 





 


Carbon Dioxide Level


 


 29 mmol/L


(21-32) 





 


Anion Gap  5 (6-14)  


 


Blood Urea Nitrogen


 


 15 mg/dL


(7-20) 





 


Creatinine


 


 0.6 mg/dL


(0.6-1.0) 





 


Estimated GFR


(Cockcroft-Gault) 


 106.3 


 





 


BUN/Creatinine Ratio  25 (6-20)  


 


Glucose Level


 


 133 mg/dL


(70-99) 





 


Calcium Level


 


 9.8 mg/dL


(8.5-10.1) 





 


Total Bilirubin


 


 0.4 mg/dL


(0.2-1.0) 





 


Aspartate Amino Transf


(AST/SGOT) 


 19 U/L (15-37) 


 





 


Alanine Aminotransferase


(ALT/SGPT) 


 15 U/L (14-59) 


 





 


Alkaline Phosphatase


 


 113 U/L


() 





 


Total Protein


 


 4.3 g/dL


(6.4-8.2) 





 


Albumin


 


 1.0 g/dL


(3.4-5.0) 





 


Albumin/Globulin Ratio  0.3 (1.0-1.7)  








Microbiology


6/18/20 Blood Culture - Preliminary, Resulted


          NO GROWTH AFTER 2 DAYS


6/15/20 Gram Stain - Final, Complete


          


6/15/20 Aerobic and Anaerobic Culture - Final, Complete


          


6/13/20 Gram Stain Evaluation - Final, Complete


          


6/13/20 Respiratory Culture - Final, Complete


          


6/13/20 Antimicrobic Susceptibility - Final, Complete


          


6/7/20 Urine Culture - Final, Complete


         


5/30/20 Gram Stain - Final, Complete


          


5/30/20 Aerobic Culture - Final, Complete


Medications





Current Medications


Potassium Acetate 60 meq/Magnesium Sulfate 14 meq/ Multivitamins 10 ml/Chromium/

Copper/Manganese/ Seleni/Zn 1 ml/ Insulin Human Regular 15 unit/ Total 

Parenteral Nutrition/Amino Acids/Dextrose/ Fat Emulsion Intravenous 1,920 ml @  

80 mls/hr TPN  CONT IV  Last administered on 6/20/20at 22:12;  Start 6/20/20 at 

22:00;  Stop 6/21/20 at 21:59


Potassium Acetate 60 meq/Magnesium Sulfate 14 meq/ Multivitamins 10 ml/Chromium/

Copper/Manganese/ Seleni/Zn 1 ml/ Insulin Human Regular 15 unit/ Total 

Parenteral Nutrition/Amino Acids/Dextrose/ Fat Emulsion Intravenous 1,920 ml @  

80 mls/hr TPN  CONT IV ;  Start 6/21/20 at 22:00;  Stop 6/22/20 at 21:59


Vitals/I & O





Vital Sign - Last 24 Hours








 6/20/20 6/20/20 6/20/20 6/20/20





 14:00 14:27 14:42 16:00


 


Temp  97.9 98.0 97.9





  97.9 98.0 97.9


 


Pulse 114 113 114 110


 


Resp 22 20 20 22


 


B/P (MAP) 104/58 (73) 109/65 104/58 113/75 (88)


 


Pulse Ox 99   99


 


O2 Delivery Tracheal Collar   Tracheal Collar


 


    





    





 6/20/20 6/20/20 6/20/20 6/20/20





 16:00 16:30 18:00 19:00


 


Temp  98.5  





  98.5  


 


Pulse  110 116 116


 


Resp  22 20 17


 


B/P (MAP)  113/75 107/68 (81) 107/65 (79)


 


Pulse Ox   99 99


 


O2 Delivery Trach Collar  Tracheal Collar Tracheal Collar


 


    





    





 6/20/20 6/20/20 6/20/20 6/20/20





 20:00 20:00 21:00 22:00


 


Temp 98.2   





 98.2   


 


Pulse 108  112 114


 


Resp 16 27 27


 


B/P (MAP) 98/50 (66)  113/75 (88) 118/78 (91)


 


Pulse Ox 98  97 97


 


O2 Delivery Tracheal Collar Trach Collar Tracheal Collar Tracheal Collar


 


    





    





 6/20/20 6/20/20 6/21/20 6/21/20





 23:00 23:47 00:00 00:10


 


Temp 98.1   





 98.1   


 


Pulse 118  108 


 


Resp 27 27 


 


B/P (MAP) 116/81 (93)  99/61 (74) 


 


Pulse Ox 96 98 99 


 


O2 Delivery Tracheal Collar Ventilator Ventilator Mechanical Ventilator


 


    





    





 6/21/20 6/21/20 6/21/20 6/21/20





 01:00 01:00 02:00 03:00


 


Pulse  100 106 102


 


Resp 16 27 27 27


 


B/P (MAP)  96/78 (84) 91/48 (62) 87/63 (71)


 


Pulse Ox 98 99 100 100


 


O2 Delivery  Ventilator Ventilator Ventilator





 6/21/20 6/21/20 6/21/20 6/21/20





 04:00 04:15 04:19 05:00


 


Temp 100.3   





 100.3   


 


Pulse 98   108


 


Resp 22   19


 


B/P (MAP) 95/57 (70)   95/45 (62)


 


Pulse Ox 100  98 97


 


O2 Delivery Ventilator Mechanical Ventilator Ventilator Ventilator


 


    





    





 6/21/20 6/21/20 6/21/20 6/21/20





 06:00 08:00 08:00 08:02


 


Temp   100.1 





   100.1 


 


Pulse 110  114 


 


Resp 20  20 


 


B/P (MAP) 93/50 (64)  138/68 (91) 


 


Pulse Ox 99  99 96


 


O2 Delivery Ventilator Mechanical Ventilator Ventilator Tracheal Collar


 


O2 Flow Rate    5.0


 


    





    





 6/21/20 6/21/20 6/21/20 





 10:00 11:31 12:03 


 


Pulse 121   


 


Resp 21   


 


B/P (MAP) 102/51 (68)   


 


Pulse Ox 98 96 96 


 


O2 Delivery Ventilator Tracheal Collar  


 


O2 Flow Rate  5.0 5.0 














Intake and Output   


 


 6/20/20 6/20/20 6/21/20





 15:00 23:00 07:00


 


Intake Total 408.5 ml 1561 ml 925 ml


 


Output Total 750 ml 1428 ml 530 ml


 


Balance -341.5 ml 133 ml 395 ml

















RENAY MMEBRENO MD           Jun 21, 2020 13:13

## 2020-06-22 VITALS — DIASTOLIC BLOOD PRESSURE: 65 MMHG | SYSTOLIC BLOOD PRESSURE: 102 MMHG

## 2020-06-22 VITALS — DIASTOLIC BLOOD PRESSURE: 62 MMHG | SYSTOLIC BLOOD PRESSURE: 104 MMHG

## 2020-06-22 VITALS — SYSTOLIC BLOOD PRESSURE: 100 MMHG | DIASTOLIC BLOOD PRESSURE: 61 MMHG

## 2020-06-22 VITALS — SYSTOLIC BLOOD PRESSURE: 111 MMHG | DIASTOLIC BLOOD PRESSURE: 79 MMHG

## 2020-06-22 VITALS — SYSTOLIC BLOOD PRESSURE: 96 MMHG | DIASTOLIC BLOOD PRESSURE: 59 MMHG

## 2020-06-22 VITALS — DIASTOLIC BLOOD PRESSURE: 74 MMHG | SYSTOLIC BLOOD PRESSURE: 113 MMHG

## 2020-06-22 VITALS — DIASTOLIC BLOOD PRESSURE: 65 MMHG | SYSTOLIC BLOOD PRESSURE: 123 MMHG

## 2020-06-22 VITALS — SYSTOLIC BLOOD PRESSURE: 119 MMHG | DIASTOLIC BLOOD PRESSURE: 81 MMHG

## 2020-06-22 VITALS — SYSTOLIC BLOOD PRESSURE: 83 MMHG | DIASTOLIC BLOOD PRESSURE: 58 MMHG

## 2020-06-22 VITALS — DIASTOLIC BLOOD PRESSURE: 75 MMHG | SYSTOLIC BLOOD PRESSURE: 116 MMHG

## 2020-06-22 VITALS — DIASTOLIC BLOOD PRESSURE: 70 MMHG | SYSTOLIC BLOOD PRESSURE: 112 MMHG

## 2020-06-22 VITALS — DIASTOLIC BLOOD PRESSURE: 69 MMHG | SYSTOLIC BLOOD PRESSURE: 112 MMHG

## 2020-06-22 VITALS — DIASTOLIC BLOOD PRESSURE: 59 MMHG | SYSTOLIC BLOOD PRESSURE: 99 MMHG

## 2020-06-22 VITALS — DIASTOLIC BLOOD PRESSURE: 62 MMHG | SYSTOLIC BLOOD PRESSURE: 113 MMHG

## 2020-06-22 VITALS — DIASTOLIC BLOOD PRESSURE: 87 MMHG | SYSTOLIC BLOOD PRESSURE: 120 MMHG

## 2020-06-22 VITALS — SYSTOLIC BLOOD PRESSURE: 109 MMHG | DIASTOLIC BLOOD PRESSURE: 61 MMHG

## 2020-06-22 VITALS — SYSTOLIC BLOOD PRESSURE: 105 MMHG | DIASTOLIC BLOOD PRESSURE: 65 MMHG

## 2020-06-22 VITALS — DIASTOLIC BLOOD PRESSURE: 67 MMHG | SYSTOLIC BLOOD PRESSURE: 119 MMHG

## 2020-06-22 VITALS — SYSTOLIC BLOOD PRESSURE: 115 MMHG | DIASTOLIC BLOOD PRESSURE: 77 MMHG

## 2020-06-22 VITALS — DIASTOLIC BLOOD PRESSURE: 66 MMHG | SYSTOLIC BLOOD PRESSURE: 108 MMHG

## 2020-06-22 VITALS — SYSTOLIC BLOOD PRESSURE: 104 MMHG | DIASTOLIC BLOOD PRESSURE: 60 MMHG

## 2020-06-22 VITALS — DIASTOLIC BLOOD PRESSURE: 74 MMHG | SYSTOLIC BLOOD PRESSURE: 108 MMHG

## 2020-06-22 VITALS — SYSTOLIC BLOOD PRESSURE: 116 MMHG | DIASTOLIC BLOOD PRESSURE: 66 MMHG

## 2020-06-22 VITALS — SYSTOLIC BLOOD PRESSURE: 137 MMHG | DIASTOLIC BLOOD PRESSURE: 87 MMHG

## 2020-06-22 LAB
IGA SERPL-MCNC: 162 MG/DL (ref 87–352)
IGG SERPL-MCNC: 743 MG/DL (ref 586–1602)
IGM SERPL-MCNC: 108 MG/DL (ref 26–217)

## 2020-06-22 RX ADMIN — IPRATROPIUM BROMIDE AND ALBUTEROL SULFATE SCH ML: .5; 3 SOLUTION RESPIRATORY (INHALATION) at 23:37

## 2020-06-22 RX ADMIN — INSULIN LISPRO SCH UNITS: 100 INJECTION, SOLUTION INTRAVENOUS; SUBCUTANEOUS at 17:18

## 2020-06-22 RX ADMIN — DILTIAZEM HYDROCHLORIDE SCH MLS/HR: 5 INJECTION INTRAVENOUS at 11:07

## 2020-06-22 RX ADMIN — DILTIAZEM HYDROCHLORIDE SCH MLS/HR: 5 INJECTION INTRAVENOUS at 08:55

## 2020-06-22 RX ADMIN — IPRATROPIUM BROMIDE AND ALBUTEROL SULFATE SCH ML: .5; 3 SOLUTION RESPIRATORY (INHALATION) at 08:47

## 2020-06-22 RX ADMIN — INSULIN LISPRO SCH UNITS: 100 INJECTION, SOLUTION INTRAVENOUS; SUBCUTANEOUS at 07:51

## 2020-06-22 RX ADMIN — CIPROFLOXACIN SCH MLS/HR: 2 INJECTION, SOLUTION INTRAVENOUS at 08:56

## 2020-06-22 RX ADMIN — Medication PRN EACH: at 11:59

## 2020-06-22 RX ADMIN — IPRATROPIUM BROMIDE AND ALBUTEROL SULFATE SCH ML: .5; 3 SOLUTION RESPIRATORY (INHALATION) at 19:34

## 2020-06-22 RX ADMIN — IPRATROPIUM BROMIDE AND ALBUTEROL SULFATE SCH ML: .5; 3 SOLUTION RESPIRATORY (INHALATION) at 11:36

## 2020-06-22 RX ADMIN — IPRATROPIUM BROMIDE AND ALBUTEROL SULFATE SCH ML: .5; 3 SOLUTION RESPIRATORY (INHALATION) at 16:05

## 2020-06-22 RX ADMIN — CIPROFLOXACIN SCH MLS/HR: 2 INJECTION, SOLUTION INTRAVENOUS at 21:43

## 2020-06-22 RX ADMIN — INSULIN LISPRO SCH UNITS: 100 INJECTION, SOLUTION INTRAVENOUS; SUBCUTANEOUS at 00:00

## 2020-06-22 RX ADMIN — PANTOPRAZOLE SODIUM SCH MG: 40 INJECTION, POWDER, FOR SOLUTION INTRAVENOUS at 08:55

## 2020-06-22 RX ADMIN — ONDANSETRON PRN MG: 2 INJECTION INTRAMUSCULAR; INTRAVENOUS at 12:23

## 2020-06-22 RX ADMIN — INSULIN LISPRO SCH UNITS: 100 INJECTION, SOLUTION INTRAVENOUS; SUBCUTANEOUS at 11:19

## 2020-06-22 RX ADMIN — IPRATROPIUM BROMIDE AND ALBUTEROL SULFATE SCH ML: .5; 3 SOLUTION RESPIRATORY (INHALATION) at 03:23

## 2020-06-22 RX ADMIN — DILTIAZEM HYDROCHLORIDE SCH MLS/HR: 5 INJECTION INTRAVENOUS at 21:43

## 2020-06-22 NOTE — PDOC
SAURAV NASH APRN 6/22/20 1056:


SURGICAL PROGRESS NOTE


Subjective


up to chair


abdominal pain


Vital Signs





Vital Signs








  Date Time  Temp Pulse Resp B/P (MAP) Pulse Ox O2 Delivery O2 Flow Rate FiO2


 


6/22/20 10:00  121 23 120/87 (98) 98 Tracheal Collar  


 


6/22/20 08:48       8.0 


 


6/22/20 08:00 98.7       





 98.7       








I&O











Intake and Output 


 


 6/22/20





 07:00


 


Intake Total 2583 ml


 


Output Total 3086 ml


 


Balance -503 ml


 


 


 


Intake Oral 0 ml


 


IV Total 2583 ml


 


Output Urine Total 2255 ml


 


Gastric Drainage Total 300 ml


 


Chest Tube Drainage Total 335 ml


 


Drainage Total 176 ml


 


Other 20 ml








General:  Alert, Cooperative


HEENT:  Other (trach)


Abdomen:  Soft


Labs





Laboratory Tests








Test


 6/20/20


13:03 6/20/20


23:59 6/21/20


05:15 6/21/20


11:51


 


Glucose (Fingerstick)


 170 mg/dL


(70-99) 132 mg/dL


(70-99) 


 171 mg/dL


(70-99)


 


White Blood Count


 


 


 10.3 x10^3/uL


(4.0-11.0) 





 


Red Blood Count


 


 


 3.10 x10^6/uL


(3.50-5.40) 





 


Hemoglobin


 


 


 9.0 g/dL


(12.0-15.5) 





 


Hematocrit


 


 


 26.3 %


(36.0-47.0) 





 


Mean Corpuscular Volume   85 fL ()  


 


Mean Corpuscular Hemoglobin   29 pg (25-35)  


 


Mean Corpuscular Hemoglobin


Concent 


 


 34 g/dL


(31-37) 





 


Red Cell Distribution Width


 


 


 17.4 %


(11.5-14.5) 





 


Platelet Count


 


 


 448 x10^3/uL


(140-400) 





 


Neutrophils (%) (Auto)   74 % (31-73)  


 


Lymphocytes (%) (Auto)   17 % (24-48)  


 


Monocytes (%) (Auto)   7 % (0-9)  


 


Eosinophils (%) (Auto)   1 % (0-3)  


 


Basophils (%) (Auto)   0 % (0-3)  


 


Neutrophils # (Auto)


 


 


 7.7 x10^3/uL


(1.8-7.7) 





 


Lymphocytes # (Auto)


 


 


 1.8 x10^3/uL


(1.0-4.8) 





 


Monocytes # (Auto)


 


 


 0.8 x10^3/uL


(0.0-1.1) 





 


Eosinophils # (Auto)


 


 


 0.1 x10^3/uL


(0.0-0.7) 





 


Basophils # (Auto)


 


 


 0.0 x10^3/uL


(0.0-0.2) 





 


Sodium Level


 


 


 136 mmol/L


(136-145) 





 


Potassium Level


 


 


 4.0 mmol/L


(3.5-5.1) 





 


Chloride Level


 


 


 102 mmol/L


() 





 


Carbon Dioxide Level


 


 


 29 mmol/L


(21-32) 





 


Anion Gap   5 (6-14)  


 


Blood Urea Nitrogen


 


 


 15 mg/dL


(7-20) 





 


Creatinine


 


 


 0.6 mg/dL


(0.6-1.0) 





 


Estimated GFR


(Cockcroft-Gault) 


 


 106.3 


 





 


BUN/Creatinine Ratio   25 (6-20)  


 


Glucose Level


 


 


 133 mg/dL


(70-99) 





 


Calcium Level


 


 


 9.8 mg/dL


(8.5-10.1) 





 


Total Bilirubin


 


 


 0.4 mg/dL


(0.2-1.0) 





 


Aspartate Amino Transf


(AST/SGOT) 


 


 19 U/L (15-37) 


 





 


Alanine Aminotransferase


(ALT/SGPT) 


 


 15 U/L (14-59) 


 





 


Alkaline Phosphatase


 


 


 113 U/L


() 





 


Total Protein


 


 


 4.3 g/dL


(6.4-8.2) 





 


Albumin


 


 


 1.0 g/dL


(3.4-5.0) 





 


Albumin/Globulin Ratio   0.3 (1.0-1.7)  


 


Test


 6/21/20


18:25 6/22/20


01:12 


 





 


Glucose (Fingerstick)


 140 mg/dL


(70-99) 133 mg/dL


(70-99) 


 











Laboratory Tests








Test


 6/21/20


11:51 6/21/20


18:25 6/22/20


01:12


 


Glucose (Fingerstick)


 171 mg/dL


(70-99) 140 mg/dL


(70-99) 133 mg/dL


(70-99)








Problem List


Problems


Medical Problems:


(1) Acute pancreatitis


Status: Acute  





(2) Cholelithiasis


Status: Acute  








Assessment/Plan


supportive care





Justicifation of Admission Dx:


Justifications for Admission:


Justification of Admission Dx:  Yes





KIEL BAL MD 6/22/20 1249:


SURGICAL PROGRESS NOTE


Assessment/Plan


as above


continue present care











SAURAV NASH APRN            Jun 22, 2020 10:56


KIEL BAL MD                Jun 22, 2020 12:49

## 2020-06-22 NOTE — NUR
Flushed left lateral chest tube with 50 cc sterile saline per Dr. Castano, pulled out an 
extremely long thin clot from tubing. Patient tolerated well without complications.

## 2020-06-22 NOTE — PDOC
PULMONARY PROGRESS NOTES


Subjective


on vent, on propofol, off vent during day/ ct drainage 210 cc/24 hrs 


Patient intubated on 3/23 , s/p trach 4/6, 


transferred back to ICU 6/5 for syncope with collapse


developed anemia, blood drainage from RLQ abdomen drain site, and surrounding 

firmness  / developed septic shock 6/7 from abdomen source, required levo 6/7


s/p 3 new drains 6/7 with brown color drainage/ oozing fluid around drains


Vitals





Vital Signs








  Date Time  Temp Pulse Resp B/P (MAP) Pulse Ox O2 Delivery O2 Flow Rate FiO2


 


6/22/20 10:00  121 23 120/87 (98) 98 Tracheal Collar  


 


6/22/20 08:48       8.0 


 


6/22/20 08:00 98.7       





 98.7       








Comments


awake,no soa


General:  Alert, No acute distress


HEENT:  Other (nc at perrl   nose clear    neck  trach site ok   no lab  no 

thyromegaly)


Lungs:  Other (diminshed in bases, Rhonci in LLL)


Cardiovascular:  S1, S2


Abdomen:  Soft, Non-tender, Other (bleeding from Sx. site RLQ and firmness)


Extremities:  Other (+1 BLE edema)


Skin:  Warm, Dry


Labs





Laboratory Tests








Test


 6/20/20


13:03 6/20/20


23:59 6/21/20


05:15 6/21/20


11:51


 


Glucose (Fingerstick)


 170 mg/dL


(70-99) 132 mg/dL


(70-99) 


 171 mg/dL


(70-99)


 


White Blood Count


 


 


 10.3 x10^3/uL


(4.0-11.0) 





 


Red Blood Count


 


 


 3.10 x10^6/uL


(3.50-5.40) 





 


Hemoglobin


 


 


 9.0 g/dL


(12.0-15.5) 





 


Hematocrit


 


 


 26.3 %


(36.0-47.0) 





 


Mean Corpuscular Volume   85 fL ()  


 


Mean Corpuscular Hemoglobin   29 pg (25-35)  


 


Mean Corpuscular Hemoglobin


Concent 


 


 34 g/dL


(31-37) 





 


Red Cell Distribution Width


 


 


 17.4 %


(11.5-14.5) 





 


Platelet Count


 


 


 448 x10^3/uL


(140-400) 





 


Neutrophils (%) (Auto)   74 % (31-73)  


 


Lymphocytes (%) (Auto)   17 % (24-48)  


 


Monocytes (%) (Auto)   7 % (0-9)  


 


Eosinophils (%) (Auto)   1 % (0-3)  


 


Basophils (%) (Auto)   0 % (0-3)  


 


Neutrophils # (Auto)


 


 


 7.7 x10^3/uL


(1.8-7.7) 





 


Lymphocytes # (Auto)


 


 


 1.8 x10^3/uL


(1.0-4.8) 





 


Monocytes # (Auto)


 


 


 0.8 x10^3/uL


(0.0-1.1) 





 


Eosinophils # (Auto)


 


 


 0.1 x10^3/uL


(0.0-0.7) 





 


Basophils # (Auto)


 


 


 0.0 x10^3/uL


(0.0-0.2) 





 


Sodium Level


 


 


 136 mmol/L


(136-145) 





 


Potassium Level


 


 


 4.0 mmol/L


(3.5-5.1) 





 


Chloride Level


 


 


 102 mmol/L


() 





 


Carbon Dioxide Level


 


 


 29 mmol/L


(21-32) 





 


Anion Gap   5 (6-14)  


 


Blood Urea Nitrogen


 


 


 15 mg/dL


(7-20) 





 


Creatinine


 


 


 0.6 mg/dL


(0.6-1.0) 





 


Estimated GFR


(Cockcroft-Gault) 


 


 106.3 


 





 


BUN/Creatinine Ratio   25 (6-20)  


 


Glucose Level


 


 


 133 mg/dL


(70-99) 





 


Calcium Level


 


 


 9.8 mg/dL


(8.5-10.1) 





 


Total Bilirubin


 


 


 0.4 mg/dL


(0.2-1.0) 





 


Aspartate Amino Transf


(AST/SGOT) 


 


 19 U/L (15-37) 


 





 


Alanine Aminotransferase


(ALT/SGPT) 


 


 15 U/L (14-59) 


 





 


Alkaline Phosphatase


 


 


 113 U/L


() 





 


Total Protein


 


 


 4.3 g/dL


(6.4-8.2) 





 


Albumin


 


 


 1.0 g/dL


(3.4-5.0) 





 


Albumin/Globulin Ratio   0.3 (1.0-1.7)  


 


Test


 6/21/20


18:25 6/22/20


01:12 


 





 


Glucose (Fingerstick)


 140 mg/dL


(70-99) 133 mg/dL


(70-99) 


 











Laboratory Tests








Test


 6/21/20


11:51 6/21/20


18:25 6/22/20


01:12


 


Glucose (Fingerstick)


 171 mg/dL


(70-99) 140 mg/dL


(70-99) 133 mg/dL


(70-99)








Medications





Active Scripts








 Medications  Dose


 Route/Sig


 Max Daily Dose Days Date Category


 


 Bisoprolol


 Fumarate 5 Mg


 Tablet  10 Mg


 PO DAILY


   3/16/20 Reported








Comments


CXR 6/22


poor insp effort/ increase effusion


 











ct abdomen /pelvis 6/6


1. Removal of the percutaneous pigtail drainage catheters since the prior 


exam. Sequela of pancreatitis with extensive pseudocysts again 


demonstrated, the right-sided collections are slightly larger since the 


prior exam, the left-sided collections are stable. See above.


2. Moderate to large left pleural effusion with atelectasis and collapse 


of most of the left lower lobe, stable. Small right pleural effusion is 


stable.


3. Gallstone.





ct chest 6/15 reviewed





Impression


.


IMPRESSION:


1.  Acute hypoxemic respiratory failure secondary to ARDS status post trach, 

developed anemia 6/7, blood drainage from RLQ abdomen drain site, and 

surrounding firmness  / developed septic shock 6/7 from abdomen source, required

levo 6/7


s/p 3 new drains 6/7 with brown color drainage, back on vent 6/13, on/off vent 


2.  Gallstone pancreatitis, now with ongoing bleeding from prior drain. Anemic. 

s/p Tx multiple units over several days


3.  septic shock/sepsis, recurrent 6/7, source abdomen. , off levo now, 


4.  Acute kidney injury-, Off HD--renal function decling. suspect JED on CKD due

to hypotension , improved now


5.  Acute gallstone pancreatitis.


6.  Hypoalbuminemia.


7.  Moderate persistent effusions, s/p left thora  5/12, reaccumulation of left 

effusion. O2 requirement not changed. 


8.  Fever- ,hypotension. suspect recurrent sepsis/ likely pancreatic source.  

Per ID, per surgery--


9.  Chronic anemia-- ongoing / s/p PRBC


10. Covid 19 testing negative


11. Moderate to large ascites-S/P paracentisis


12.S/P paracentisis with 4 liters removed on 4/15/20


13. S/P IR drain placement on 5/8/2020, removal, re inserted 6/7


14. Depression/Anxiety 


15. Increase effusion, ? loculated/ s/p chest tube.. drainage slowing down. 180 

cc /24 hr





Plan


.


1. TS as tolerated  titrate fio2 to keep sat 94%, off  vent as much as she can 

tolerate


2. Follow all cultures/ PSA from pelvic drains. Abx per ID/  thoracentesis 

result transudate, await c/s,


3. Follow surgery recs-- Acute pancreatitis with persistent necrosis ---- 4/27 

status post ROBERT drain placement + C paropsilosis; 5/6 + yeast & high amylase; s/p

additional drains on 5/8, removal of drains and now increase pseudocyst and 

increase fluid collection. likely infected fluid/ sepsis. on BS abx.follow 

surgery rec, planning surgical intervention next few weeks


4. Follow ID recs for ABX----


5. Follow nephrology recs -----


6. Continue TPN 


7. monitor HGB,  4 units since 6/6--monitor HGB transfuse if less than 8.5 


8 s/p  thoracentesis, 5/12, 3 litres removed, s/p ct on 6/15. ct drainage, 180 

cc over the past 24 hrs,  suction -40, am cxr, will flush chest tube


9. Pt remains critically ill from severe necrotic pancreatis. Even with surgery 

prognosis grim. Surgery informed family.


10. lasix/albumin prn





     


DVT/GI PPX: protonix--- holding lovenox 2/2 anemia


PT/OT


D/W RN, rt











SHARYN SOLORZANO MD                 Jun 22, 2020 11:15

## 2020-06-22 NOTE — NUR
RT placed [place] valve on patient, within 15 seconds patient's SPO2 was in the 80s and 
patient began to have frequent PACs with a rate in the 90s instead of 110s. Patient lasted 2 
minutes then was switched back to trach shield at 33%.

## 2020-06-22 NOTE — NUR
Pharmacy TPN Dosing Note



S: JESENIA BEAN is a 49 year old F Currently receiving Central Continuous TPN started 
03/18/20



B:Pertinent PMH: 

Necrotizing pancreatitis

Height: 5 feet, 8 inches

Weight: 92.3 kg



Current diet: NPO 



LABS:

Sodium:    136 

Potassium: 4.0 

Chloride:  102 

Calcium:   9.8 

Corrected Calcium: 12.20 

Magnesium: 1.9 

CO2:       29 

SCr:       0.6 

Glucose:   134 

Albumin:   1.0 

AST:       17 

ALT:       18 



TPN FORMULA:

TPN TYPE:  Central Continuous

AMINO ACIDS:         80 gm

DEXTROSE:            250 gm

LIPIDS:              20 gm

SODIUM CHLORIDE:     20 mEq

SODIUM ACETATE:      - mEq

SODIUM PHOSPHATE:    - mmol

POTASSIUM CHLORIDE:  - mEq

POTASSIUM ACETATE:   60 mEq

POTASSIUM PHOSPHATE: - mmol

MAGNESIUM:           14 mEq

CALCIUM:             - mEq

INSULIN:             15 units

MULTIPLE VITAMIN:    10 ml

TRACE ELEMENTS:      1 ml(s)



TPN PLAN:  

NA TRENDING DOWN. ADD NACL 20 MEQ TO TPN.





R: Continue TPN at 80 ml/hr

Will monitor electrolytes, glucose, and tolerance to TPN.



 YENIFER STREETER [facility], 06/22/20 1121

## 2020-06-22 NOTE — PDOC
PRESTON MARX APRN 6/22/20 1231:


CARDIO Progress Notes


Date and Time


Date of Service


6/22/20


Time of Evaluation


1210





Subjective


Subjective:  Other (sleeping )





Vitals


Vitals





Vital Signs








  Date Time  Temp Pulse Resp B/P (MAP) Pulse Ox O2 Delivery O2 Flow Rate FiO2


 


6/22/20 12:00      Trach Collar  


 


6/22/20 11:00  115 33 137/87 (104) 98   


 


6/22/20 08:48       8.0 


 


6/22/20 08:00 98.7       





 98.7       








Weight


Weight [ ]





Input and Output


Intake and Output











Intake and Output 


 


 6/22/20





 07:00


 


Intake Total 2583 ml


 


Output Total 3086 ml


 


Balance -503 ml


 


 


 


Intake Oral 0 ml


 


IV Total 2583 ml


 


Output Urine Total 2255 ml


 


Gastric Drainage Total 300 ml


 


Chest Tube Drainage Total 335 ml


 


Drainage Total 176 ml


 


Other 20 ml











Laboratory


Labs





Laboratory Tests








Test


 6/21/20


18:25 6/22/20


01:12 6/22/20


11:17


 


Glucose (Fingerstick)


 140 mg/dL


(70-99) 133 mg/dL


(70-99) 183 mg/dL


(70-99)











Microbiology


Micro





Microbiology


6/18/20 Blood Culture - Preliminary, Resulted


          NO GROWTH AFTER 3 DAYS


6/15/20 Gram Stain - Final, Complete


          


6/15/20 Aerobic and Anaerobic Culture - Final, Complete


          


6/13/20 Gram Stain Evaluation - Final, Complete


          


6/13/20 Respiratory Culture - Final, Complete


          


6/13/20 Antimicrobic Susceptibility - Final, Complete


          


6/7/20 Urine Culture - Final, Complete


         


5/30/20 Gram Stain - Final, Complete


          


5/30/20 Aerobic Culture - Final, Complete





Review of Systems


Constitutional:  yes: other (DIFFICULT FOR HER TO TALK)





Physical Exam


HEENT:  Neck Supple W Full Motion


Chest:  Symmetric


LUNGS:  Clear to Auscultation


Heart:  S1S2, RRR (ST rate 115)


Extremities:  Other (1-2+ bilateral LE edema, anasarca)


Neurology:  other (sleeping )





Assessment


Assessment


1. Severe acute gallstone pancreatitis with necrosis


2. Acute respiratory failure: presently has trach and chest tube.  


3. Anasarca with severe protein malnutrition


4. Sepsis with MDRO: off pressors. 


5. Acute diastolic CHF: multifactorial, better compensated. 


6. Normocytic anemia: s/p transfusions


7. Sinus tachycardia: Most probably physiologic. Rate controlled overall.


8. Severe JED requiring HD: last dialysis about a month ago








Recommendations





Lasix PRN


Metoprolol IV PRN for sustained tachycardia


Echo pending


Supportive care.





Justicifation of Admission Dx:


Justifications for Admission:


Justification of Admission Dx:  Yes





DIMITRIS BETANCOURT MD 6/23/20 1257:


CARDIO Progress Notes


Plan


Plan


Late entry for 6/22/2020


Patient seen and examined.  Agree with above nurse practitioner note.











PRESTON MARX           Jun 22, 2020 12:31


DIMITRIS BETANCOURT MD       Jun 23, 2020 12:57

## 2020-06-22 NOTE — PDOC
PROGRESS NOTES


Assessment


Problems


Medical Problems:


(1) Acute pancreatitis


Status: Acute  





(2) Cholelithiasis


Status: Acute  





Single seizure on 3/6, no recurrence.


Metabolic encephalopathy.


Fevers.


Metabolic acidosis.


Diffuse pulmonary infiltrate.


Pleural effusion.


Pancreatitis, necrotizing.


Gallstone.


Leukocytosis.


Lymphopenia.


Electrolytes imbalances.


Hyperglycemia.


DM.


HTN.


HLD.


Anemia.


Abnormal CXR.


Obesity.


Ascites and pleural effusion


Ileus with vomiting


Anemia 


JED


Hyperkalemia


Metabolic acidosis


Hypertension


S/P trache, back on vent


Anemia


S/P IR drain placement, 6/7


Hypotension, intermittently requiring Levophed


Plan


Keppra if she has further seizures.


Treat medical diseases.


Subjective


Denies pain


Objective





Vital Signs








  Date Time  Temp Pulse Resp B/P (MAP) Pulse Ox O2 Delivery O2 Flow Rate FiO2


 


6/22/20 09:00  117 22 119/67 [phone]) 97 Tracheal Collar  


 


6/22/20 08:48       8.0 


 


6/22/20 08:00 98.7       





 98.7       














Intake and Output 


 


 6/22/20





 06:59


 


Intake Total 2583 ml


 


Output Total 3036 ml


 


Balance -453 ml


 


 


 


Intake Oral 0 ml


 


IV Total 2583 ml


 


Output Urine Total 2205 ml


 


Gastric Drainage Total 300 ml


 


Chest Tube Drainage Total 335 ml


 


Drainage Total 176 ml


 


Other 20 ml








PHYSICAL EXAM


Alert, follows commands. Tracheostomy shield. Back on vent


PERRL.


EOMI.


CN: no focal findings.


Muscle tone: normal.


Muscle strength: 3-4/5


DTR: 1+


Plantar reflex: Silent


Gait: not examined in bed.


Sensory exam: normal


Cerebellar: normal


Review of Relevant


I have reviewed the following items josy (where applicable) has been applied.


Labs





Laboratory Tests








Test


 6/20/20


13:03 6/20/20


23:59 6/21/20


05:15 6/21/20


11:51


 


Glucose (Fingerstick)


 170 mg/dL


(70-99) 132 mg/dL


(70-99) 


 171 mg/dL


(70-99)


 


White Blood Count


 


 


 10.3 x10^3/uL


(4.0-11.0) 





 


Red Blood Count


 


 


 3.10 x10^6/uL


(3.50-5.40) 





 


Hemoglobin


 


 


 9.0 g/dL


(12.0-15.5) 





 


Hematocrit


 


 


 26.3 %


(36.0-47.0) 





 


Mean Corpuscular Volume   85 fL ()  


 


Mean Corpuscular Hemoglobin   29 pg (25-35)  


 


Mean Corpuscular Hemoglobin


Concent 


 


 34 g/dL


(31-37) 





 


Red Cell Distribution Width


 


 


 17.4 %


(11.5-14.5) 





 


Platelet Count


 


 


 448 x10^3/uL


(140-400) 





 


Neutrophils (%) (Auto)   74 % (31-73)  


 


Lymphocytes (%) (Auto)   17 % (24-48)  


 


Monocytes (%) (Auto)   7 % (0-9)  


 


Eosinophils (%) (Auto)   1 % (0-3)  


 


Basophils (%) (Auto)   0 % (0-3)  


 


Neutrophils # (Auto)


 


 


 7.7 x10^3/uL


(1.8-7.7) 





 


Lymphocytes # (Auto)


 


 


 1.8 x10^3/uL


(1.0-4.8) 





 


Monocytes # (Auto)


 


 


 0.8 x10^3/uL


(0.0-1.1) 





 


Eosinophils # (Auto)


 


 


 0.1 x10^3/uL


(0.0-0.7) 





 


Basophils # (Auto)


 


 


 0.0 x10^3/uL


(0.0-0.2) 





 


Sodium Level


 


 


 136 mmol/L


(136-145) 





 


Potassium Level


 


 


 4.0 mmol/L


(3.5-5.1) 





 


Chloride Level


 


 


 102 mmol/L


() 





 


Carbon Dioxide Level


 


 


 29 mmol/L


(21-32) 





 


Anion Gap   5 (6-14)  


 


Blood Urea Nitrogen


 


 


 15 mg/dL


(7-20) 





 


Creatinine


 


 


 0.6 mg/dL


(0.6-1.0) 





 


Estimated GFR


(Cockcroft-Gault) 


 


 106.3 


 





 


BUN/Creatinine Ratio   25 (6-20)  


 


Glucose Level


 


 


 133 mg/dL


(70-99) 





 


Calcium Level


 


 


 9.8 mg/dL


(8.5-10.1) 





 


Total Bilirubin


 


 


 0.4 mg/dL


(0.2-1.0) 





 


Aspartate Amino Transf


(AST/SGOT) 


 


 19 U/L (15-37) 


 





 


Alanine Aminotransferase


(ALT/SGPT) 


 


 15 U/L (14-59) 


 





 


Alkaline Phosphatase


 


 


 113 U/L


() 





 


Total Protein


 


 


 4.3 g/dL


(6.4-8.2) 





 


Albumin


 


 


 1.0 g/dL


(3.4-5.0) 





 


Albumin/Globulin Ratio   0.3 (1.0-1.7)  


 


Test


 6/21/20


18:25 6/22/20


01:12 


 





 


Glucose (Fingerstick)


 140 mg/dL


(70-99) 133 mg/dL


(70-99) 


 











Laboratory Tests








Test


 6/21/20


11:51 6/21/20


18:25 6/22/20


01:12


 


Glucose (Fingerstick)


 171 mg/dL


(70-99) 140 mg/dL


(70-99) 133 mg/dL


(70-99)








Microbiology


6/18/20 Blood Culture - Preliminary, Resulted


          NO GROWTH AFTER 3 DAYS


6/15/20 Gram Stain - Final, Complete


          


6/15/20 Aerobic and Anaerobic Culture - Final, Complete


          


6/13/20 Gram Stain Evaluation - Final, Complete


          


6/13/20 Respiratory Culture - Final, Complete


          


6/13/20 Antimicrobic Susceptibility - Final, Complete


          


6/7/20 Urine Culture - Final, Complete


         


5/30/20 Gram Stain - Final, Complete


          


5/30/20 Aerobic Culture - Final, Complete


Medications





Current Medications


Sodium Chloride 1,000 ml @  1,000 mls/hr Q1H IV  Last administered on 3/16/20at 

03:00;  Start 3/16/20 at 03:00;  Stop 3/16/20 at 03:59;  Status DC


Ondansetron HCl (Zofran) 4 mg 1X  ONCE IVP  Last administered on 3/16/20at 

03:27;  Start 3/16/20 at 03:00;  Stop 3/16/20 at 03:01;  Status DC


Morphine Sulfate (Morphine Sulfate) 4 mg 1X  ONCE IV ;  Start 3/16/20 at 03:00; 

Stop 3/16/20 at 03:01;  Status Cancel


Ketorolac Tromethamine (Toradol 30mg Vial) 30 mg 1X  ONCE IV  Last administered 

on 3/16/20at 02:54;  Start 3/16/20 at 03:00;  Stop 3/16/20 at 03:01;  Status DC


Fentanyl Citrate (Fentanyl 2ml Vial) 25 mcg 1X  ONCE IVP  Last administered on 

3/16/20at 03:23;  Start 3/16/20 at 03:30;  Stop 3/16/20 at 03:31;  Status DC


Fentanyl Citrate (Fentanyl 2ml Vial) 100 mcg STK-MED ONCE .ROUTE ;  Start 

3/16/20 at 03:18;  Stop 3/16/20 at 03:18;  Status DC


Iohexol (Omnipaque 350 Mg/ml) 90 ml 1X  ONCE IV  Last administered on 3/16/20at 

03:25;  Start 3/16/20 at 03:30;  Stop 3/16/20 at 03:31;  Status DC


Info (CONTRAST GIVEN -- Rx MONITORING) 1 each PRN DAILY  PRN MC SEE COMMENTS;  

Start 3/16/20 at 03:30;  Stop 3/18/20 at 03:29;  Status DC


Hydromorphone HCl (Dilaudid) 0.5 mg 1X  ONCE IV  Last administered on 3/16/20at 

03:55;  Start 3/16/20 at 04:30;  Stop 3/16/20 at 04:32;  Status DC


Ondansetron HCl (Zofran) 4 mg PRN Q8HRS  PRN IV NAUSEA/VOMITING 1ST CHOICE;  

Start 3/16/20 at 05:00;  Stop 3/16/20 at 09:27;  Status DC


Morphine Sulfate (Morphine Sulfate) 2 mg PRN Q2HR  PRN IV SEVERE PAIN 7-10 Last 

administered on 3/17/20at 12:26;  Start 3/16/20 at 05:00;  Stop 3/17/20 at 

14:15;  Status DC


Sodium Chloride 1,000 ml @  125 mls/hr Q8H IV  Last administered on 3/16/20at 

20:56;  Start 3/16/20 at 05:00;  Stop 3/17/20 at 04:59;  Status DC


Hydromorphone HCl (Dilaudid) 0.5 mg PRN Q3HRS  PRN IV SEVERE PAIN 7-10 Last 

administered on 3/17/20at 10:06;  Start 3/16/20 at 05:00;  Stop 3/17/20 at 

12:01;  Status DC


Piperacillin Sod/ Tazobactam Sod 4.5 gm/Sodium Chloride 100 ml @  200 mls/hr 1X 

ONCE IV  Last administered on 3/16/20at 05:44;  Start 3/16/20 at 06:00;  Stop 3/

16/20 at 06:29;  Status DC


Ondansetron HCl (Zofran) 4 mg PRN Q4HRS  PRN IV NAUSEA/VOMITING 1ST CHOICE Last 

administered on 6/21/20at 08:51;  Start 3/16/20 at 09:30


Insulin Human Lispro (HumaLOG) 0-9 UNITS Q6HRS SQ  Last administered on 

6/21/20at 12:02;  Start 3/16/20 at 09:30


Dextrose (Dextrose 50%-Water Syringe) 12.5 gm PRN Q15MIN  PRN IV SEE COMMENTS;  

Start 3/16/20 at 09:30


Pantoprazole Sodium (PROTONIX VIAL for IV PUSH) 40 mg DAILYAC IVP  Last 

administered on 6/22/20at 08:55;  Start 3/16/20 at 11:30


Prochlorperazine Edisylate (Compazine) 10 mg PRN Q6HRS  PRN IV NAUSEA/VOMITING, 

2nd CHOICE Last administered on 6/20/20at 23:50;  Start 3/16/20 at 17:45


Atenolol (Tenormin) 100 mg DAILY PO ;  Start 3/17/20 at 09:00;  Stop 3/16/20 at 

20:08;  Status DC


Metoprolol Tartrate (Lopressor Vial) 2.5 mg Q6HRS IVP  Last administered on 

3/17/20at 05:51;  Start 3/16/20 at 20:15;  Stop 3/17/20 at 10:02;  Status DC


Metoprolol Tartrate (Lopressor Vial) 5 mg Q6HRS IVP  Last administered on 

3/26/20at 00:12;  Start 3/17/20 at 10:15;  Stop 3/28/20 at 08:48;  Status DC


Hydromorphone HCl (Dilaudid) 1 mg PRN Q3HRS  PRN IV SEVERE PAIN 7-10 Last 

administered on 3/23/20at 05:13;  Start 3/17/20 at 12:00;  Stop 3/31/20 at 

00:25;  Status DC


Lidocaine HCl (Buffered Lidocaine 1%) 3 ml STK-MED ONCE .ROUTE ;  Start 3/17/20 

at 12:55;  Stop 3/17/20 at 12:56;  Status DC


Albumin Human 500 ml @  125 mls/hr 1X  ONCE IV  Last administered on 3/17/20at 

14:33;  Start 3/17/20 at 14:30;  Stop 3/17/20 at 18:32;  Status DC


Norepinephrine Bitartrate 8 mg/ Dextrose 258 ml @  17.299 mls/ hr CONT  PRN IV 

PER PROTOCOL Last administered on 4/14/20at 12:48;  Start 3/17/20 at 15:30;  

Stop 4/17/20 at 09:19;  Status DC


Sodium Chloride 1,000 ml @  125 mls/hr Q8H IV  Last administered on 3/17/20at 

21:04;  Start 3/17/20 at 16:00;  Stop 3/18/20 at 02:42;  Status DC


Albumin Human 500 ml @  125 mls/hr PRN BID  PRN IV After every 2L NSS & BP < 

90mm Last administered on 6/6/20at 11:40;  Start 3/17/20 at 16:00


Iohexol (Omnipaque 300 Mg/ml) 60 ml 1X  ONCE IV  Last administered on 3/17/20at 

17:20;  Start 3/17/20 at 17:00;  Stop 3/17/20 at 17:01;  Status DC


Info (CONTRAST GIVEN -- Rx MONITORING) 1 each PRN DAILY  PRN MC SEE COMMENTS;  

Start 3/17/20 at 17:00;  Stop 3/19/20 at 16:59;  Status DC


Meropenem 1 gm/ Sodium Chloride 100 ml @  200 mls/hr Q8HRS IV  Last administered

on 3/18/20at 05:45;  Start 3/17/20 at 20:00;  Stop 3/18/20 at 08:48;  Status DC


Furosemide (Lasix) 40 mg 1X  ONCE IVP  Last administered on 3/17/20at 22:12;  

Start 3/17/20 at 22:30;  Stop 3/17/20 at 22:31;  Status DC


Calcium Chloride 1000 mg/Sodium Chloride 110 ml @  220 mls/hr 1X  ONCE IV  Last 

administered on 3/17/20at 22:11;  Start 3/17/20 at 22:30;  Stop 3/17/20 at 

22:59;  Status DC


Albuterol Sulfate (Ventolin Neb Soln) 2.5 mg 1X  ONCE NEB  Last administered on 

3/18/20at 00:56;  Start 3/17/20 at 22:30;  Stop 3/17/20 at 22:31;  Status DC


Insulin Human Regular (HumuLIN R VIAL) 5 unit 1X  ONCE IV  Last administered on 

3/17/20at 22:14;  Start 3/17/20 at 22:30;  Stop 3/17/20 at 22:31;  Status DC


Magnesium Sulfate 50 ml @ 25 mls/hr 1X  ONCE IV  Last administered on 3/18/20at 

02:57;  Start 3/18/20 at 03:00;  Stop 3/18/20 at 04:59;  Status DC


Calcium Gluconate 1000 mg/Sodium Chloride 110 ml @  220 mls/hr 1X  ONCE IV  Last

administered on 3/18/20at 02:46;  Start 3/18/20 at 03:00;  Stop 3/18/20 at 

03:29;  Status DC


Sodium Chloride 1,000 ml @  200 mls/hr Q5H IV  Last administered on 3/18/20at 

02:46;  Start 3/18/20 at 03:00;  Stop 3/18/20 at 10:21;  Status DC


Calcium Gluconate 1000 mg/Sodium Chloride 110 ml @  220 mls/hr 1X  ONCE IV  Last

administered on 3/18/20at 03:21;  Start 3/18/20 at 03:30;  Stop 3/18/20 at 

03:59;  Status DC


Sodium Bicarbonate 50 meq/Sodium Chloride 1,050 ml @  75 mls/hr Q14H IV  Last 

administered on 3/22/20at 21:10;  Start 3/18/20 at 07:30;  Stop 3/23/20 at 

10:28;  Status DC


Calcium Gluconate 2000 mg/Sodium Chloride 120 ml @  220 mls/hr 1X  ONCE IV  Last

administered on 3/18/20at 09:05;  Start 3/18/20 at 07:30;  Stop 3/18/20 at 

08:02;  Status DC


Lidocaine HCl (Xylocaine-Mpf 1% 2ml Vial) 2 ml STK-MED ONCE .ROUTE ;  Start 

3/18/20 at 08:47;  Stop 3/18/20 at 08:47;  Status DC


Meropenem 500 mg/ Sodium Chloride 50 ml @  100 mls/hr Q12HR IV  Last 

administered on 3/23/20at 21:01;  Start 3/18/20 at 18:00;  Stop 3/24/20 at 

07:58;  Status DC


Lidocaine HCl (Buffered Lidocaine 1%) 3 ml STK-MED ONCE .ROUTE ;  Start 3/18/20 

at 09:46;  Stop 3/18/20 at 09:46;  Status DC


Lidocaine HCl (Buffered Lidocaine 1%) 6 ml 1X  ONCE INJ  Last administered on 

3/18/20at 10:26;  Start 3/18/20 at 10:15;  Stop 3/18/20 at 10:16;  Status DC


Info (Tpn Per Pharmacy) 1 each PRN DAILY  PRN MC SEE COMMENTS Last administered 

on 6/21/20at 11:19;  Start 3/18/20 at 12:00


Sodium Chloride 1,000 ml @  1,000 mls/hr Q1H PRN IV hypotension;  Start 3/18/20 

at 12:07;  Stop 3/18/20 at 18:06;  Status DC


Diphenhydramine HCl (Benadryl) 25 mg 1X PRN  PRN IV ITCHING;  Start 3/18/20 at 

12:15;  Stop 3/19/20 at 12:14;  Status DC


Diphenhydramine HCl (Benadryl) 25 mg 1X PRN  PRN IV ITCHING;  Start 3/18/20 at 

12:15;  Stop 3/19/20 at 12:14;  Status DC


Sodium Chloride 1,000 ml @  400 mls/hr Q2H30M PRN IV PATENCY;  Start 3/18/20 at 

12:07;  Stop 3/19/20 at 00:06;  Status DC


Info (PHARMACY MONITORING -- do not chart) 1 each PRN DAILY  PRN MC SEE 

COMMENTS;  Start 3/18/20 at 12:15;  Stop 3/20/20 at 08:13;  Status DC


Sodium Chloride 90 meq/Calcium Gluconate 10 meq/ Multivitamins 10 ml/Chromium/ 

Copper/Manganese/ Seleni/Zn 1 ml/ Total Parenteral Nutrition/Amino 

Acids/Dextrose/ Fat Emulsion Intravenous 55.005 ml  @ 2.292 mls/hr TPN  CONT IV 

;  Start 3/18/20 at 22:00;  Stop 3/18/20 at 12:33;  Status DC


Info (Tpn Per Pharmacy) 1 each PRN DAILY  PRN MC SEE COMMENTS;  Start 3/18/20 at

12:30;  Status UNV


Sodium Chloride 90 meq/Calcium Gluconate 10 meq/ Multivitamins 10 ml/Chromium/ 

Copper/Manganese/ Seleni/Zn 0.5 ml/ Total Parenteral Nutrition/Amino 

Acids/Dextrose/ Fat Emulsion Intravenous 1,512 ml @  63 mls/hr TPN  CONT IV  

Last administered on 3/18/20at 22:06;  Start 3/18/20 at 22:00;  Stop 3/19/20 at 

21:59;  Status DC


Calcium Carbonate/ Glycine (Tums) 500 mg PRN AFTMEALHC  PRN PO INDIGESTION;  

Start 3/18/20 at 17:45;  Stop 5/13/20 at 10:25;  Status DC


Calcium Gluconate (Calcium Gluconate) 2,000 mg 1X  ONCE IVP  Last administered 

on 3/19/20at 02:19;  Start 3/19/20 at 02:15;  Stop 3/19/20 at 02:16;  Status DC


Calcium Chloride 3000 mg/Sodium Chloride 1,030 ml @  50 mls/hr [id number] IV  Last 

administered on 3/21/20at 02:17;  Start 3/19/20 at 08:00;  Stop 3/21/20 at 

15:23;  Status DC


Lorazepam (Ativan Inj) 1 mg PRN Q4HRS  PRN IVP ANXIETY / AGITATION, 2nd choic 

Last administered on 4/17/20at 03:51;  Start 3/19/20 at 09:00;  Stop 4/17/20 at 

09:19;  Status DC


Sodium Chloride 1,000 ml @  1,000 mls/hr Q1H PRN IV hypotension;  Start 3/19/20 

at 08:56;  Stop 3/19/20 at 14:55;  Status DC


Albumin Human 200 ml @  200 mls/hr 1X PRN  PRN IV Hypotension;  Start 3/19/20 at

09:00;  Stop 3/19/20 at 14:59;  Status DC


Diphenhydramine HCl (Benadryl) 25 mg 1X PRN  PRN IV ITCHING;  Start 3/19/20 at 

09:00;  Stop 3/20/20 at 08:59;  Status DC


Diphenhydramine HCl (Benadryl) 25 mg 1X PRN  PRN IV ITCHING;  Start 3/19/20 at 

09:00;  Stop 3/20/20 at 08:59;  Status DC


Sodium Chloride 1,000 ml @  400 mls/hr Q2H30M PRN IV PATENCY;  Start 3/19/20 at 

08:56;  Stop 3/19/20 at 20:55;  Status DC


Info (PHARMACY MONITORING -- do not chart) 1 each PRN DAILY  PRN MC SEE 

COMMENTS;  Start 3/19/20 at 09:00;  Status UNV


Info (PHARMACY MONITORING -- do not chart) 1 each PRN DAILY  PRN MC SEE 

COMMENTS;  Start 3/19/20 at 09:00;  Stop 3/20/20 at 08:13;  Status DC


Digoxin (Lanoxin) 500 mcg 1X  ONCE IV  Last administered on 3/19/20at 10:04;  

Start 3/19/20 at 10:00;  Stop 3/19/20 at 10:01;  Status DC


Digoxin (Lanoxin) 125 mcg 1X  ONCE IV  Last administered on 3/19/20at 17:10;  

Start 3/19/20 at 18:00;  Stop 3/19/20 at 18:01;  Status DC


Magnesium Sulfate 100 ml @  25 mls/hr 1X  ONCE IV  Last administered on 3

/19/20at 12:48;  Start 3/19/20 at 13:00;  Stop 3/19/20 at 16:59;  Status DC


Sodium Chloride 90 meq/Magnesium Sulfate 10 meq/ Calcium Gluconate 20 meq/ 

Multivitamins 10 ml/Chromium/ Copper/Manganese/ Seleni/Zn 0.5 ml/ Total 

Parenteral Nutrition/Amino Acids/Dextrose/ Fat Emulsion Intravenous 1,512 ml @  

63 mls/hr TPN  CONT IV  Last administered on 3/19/20at 22:25;  Start 3/19/20 at 

22:00;  Stop 3/20/20 at 21:59;  Status DC


Sodium Chloride 1,000 ml @  1,000 mls/hr Q1H PRN IV hypotension;  Start 3/20/20 

at 08:05;  Stop 3/20/20 at 14:04;  Status DC


Albumin Human 200 ml @  200 mls/hr 1X  ONCE IV  Last administered on 3/20/20at 

08:57;  Start 3/20/20 at 08:15;  Stop 3/20/20 at 09:14;  Status DC


Diphenhydramine HCl (Benadryl) 25 mg 1X PRN  PRN IV ITCHING;  Start 3/20/20 at 

08:15;  Stop 3/21/20 at 08:14;  Status DC


Diphenhydramine HCl (Benadryl) 25 mg 1X PRN  PRN IV ITCHING;  Start 3/20/20 at 

08:15;  Stop 3/21/20 at 08:14;  Status DC


Sodium Chloride 1,000 ml @  400 mls/hr Q2H30M PRN IV PATENCY;  Start 3/20/20 at 

08:05;  Stop 3/20/20 at 20:04;  Status DC


Info (PHARMACY MONITORING -- do not chart) 1 each PRN DAILY  PRN MC SEE 

COMMENTS;  Start 3/20/20 at 08:15;  Stop 3/24/20 at 07:57;  Status DC


Sodium Chloride 90 meq/Potassium Chloride 15 meq/ Potassium Phosphate 10 mmol/ 

Magnesium Sulfate 10 meq/Calcium Gluconate 20 meq/ Multivitamins 10 ml/Chromium/

Copper/Manganese/ Seleni/Zn 0.5 ml/ Total Parenteral Nutrition/Amino 

Acids/Dextrose/ Fat Emulsion Intravenous 1,512 ml @  63 mls/hr TPN  CONT IV  

Last administered on 3/20/20at 21:01;  Start 3/20/20 at 22:00;  Stop 3/21/20 at 

21:59;  Status DC


Potassium Chloride/Water 100 ml @  100 mls/hr 1X  ONCE IV  Last administered on 

3/20/20at 14:09;  Start 3/20/20 at 14:00;  Stop 3/20/20 at 14:59;  Status DC


Benzocaine (Hurricaine One) 1 spray 1X  ONCE MM  Last administered on 3/20/20at 

16:38;  Start 3/20/20 at 14:30;  Stop 3/20/20 at 14:31;  Status DC


Lidocaine HCl (Glydo (Lidocaine) Jelly) 1 thomas 1X  ONCE MM  Last administered on 

3/20/20at 16:38;  Start 3/20/20 at 14:30;  Stop 3/20/20 at 14:31;  Status DC


Linezolid/Dextrose 300 ml @  300 mls/hr Q12HR IV  Last administered on 3/26/20at

21:04;  Start 3/20/20 at 20:00;  Stop 3/27/20 at 07:50;  Status DC


Acetaminophen (Tylenol) 650 mg PRN Q6HRS  PRN PO MILD PAIN / TEMP;  Start 

3/21/20 at 03:30;  Stop 3/21/20 at 03:36;  Status DC


Acetaminophen (Tylenol) 650 mg PRN Q6HRS  PRN PEG MILD PAIN / TEMP Last 

administered on 4/16/20at 19:56;  Start 3/21/20 at 03:36;  Stop 5/13/20 at 

10:25;  Status DC


Sodium Chloride 1,000 ml @  1,000 mls/hr Q1H PRN IV hypotension;  Start 3/21/20 

at 07:50;  Stop 3/21/20 at 13:49;  Status DC


Albumin Human 200 ml @  200 mls/hr 1X PRN  PRN IV Hypotension;  Start 3/21/20 at

08:00;  Stop 3/21/20 at 13:59;  Status DC


Sodium Chloride (Normal Saline Flush) 10 ml 1X PRN  PRN IV AP catheter pack;  

Start 3/21/20 at 08:00;  Stop 3/22/20 at 07:59;  Status DC


Sodium Chloride (Normal Saline Flush) 10 ml 1X PRN  PRN IV  catheter pack;  

Start 3/21/20 at 08:00;  Stop 3/22/20 at 07:59;  Status DC


Sodium Chloride 1,000 ml @  400 mls/hr Q2H30M PRN IV PATENCY;  Start 3/21/20 at 

07:50;  Stop 3/21/20 at 19:49;  Status DC


Info (PHARMACY MONITORING -- do not chart) 1 each PRN DAILY  PRN MC SEE 

COMMENTS;  Start 3/21/20 at 08:00;  Status UNV


Info (PHARMACY MONITORING -- do not chart) 1 each PRN DAILY  PRN MC SEE 

COMMENTS;  Start 3/21/20 at 08:00;  Stop 3/23/20 at 08:25;  Status DC


Sodium Chloride 90 meq/Potassium Chloride 15 meq/ Potassium Phosphate 10 mmol/ 

Magnesium Sulfate 10 meq/Calcium Gluconate 20 meq/ Multivitamins 10 ml/Chromium/

Copper/Manganese/ Seleni/Zn 0.5 ml/ Total Parenteral Nutrition/Amino 

Acids/Dextrose/ Fat Emulsion Intravenous 1,512 ml @  63 mls/hr TPN  CONT IV  

Last administered on 3/21/20at 20:57;  Start 3/21/20 at 22:00;  Stop 3/22/20 at 

21:59;  Status DC


Sodium Chloride 90 meq/Potassium Chloride 15 meq/ Potassium Phosphate 15 mmol/ 

Magnesium Sulfate 10 meq/Calcium Gluconate 20 meq/ Multivitamins 10 ml/Chromium/

Copper/Manganese/ Seleni/Zn 0.5 ml/ Total Parenteral Nutrition/Amino 

Acids/Dextrose/ Fat Emulsion Intravenous 1,512 ml @  63 mls/hr TPN  CONT IV ;  

Start 3/22/20 at 22:00;  Stop 3/22/20 at 14:16;  Status DC


Sodium Chloride 90 meq/Potassium Chloride 15 meq/ Potassium Phosphate 15 mmol/ 

Magnesium Sulfate 10 meq/Calcium Gluconate 20 meq/ Multivitamins 10 ml/Chromium/

Copper/Manganese/ Seleni/Zn 0.5 ml/ Total Parenteral Nutrition/Amino 

Acids/Dextrose/ Fat Emulsion Intravenous 1,200 ml @  50 mls/hr TPN  CONT IV ;  

Start 3/22/20 at 22:00;  Stop 3/22/20 at 14:17;  Status DC


Sodium Chloride 90 meq/Potassium Chloride 15 meq/ Potassium Phosphate 10 mmol/ 

Magnesium Sulfate 10 meq/Calcium Gluconate 20 meq/ Multivitamins 10 ml/Chromium/

Copper/Manganese/ Seleni/Zn 0.5 ml/ Total Parenteral Nutrition/Amino 

Acids/Dextrose/ Fat Emulsion Intravenous 1,200 ml @  50 mls/hr TPN  CONT IV  

Last administered on 3/22/20at 23:29;  Start 3/22/20 at 22:00;  Stop 3/23/20 at 

21:59;  Status DC


Sodium Chloride 1,000 ml @  1,000 mls/hr Q1H PRN IV hypotension;  Start 3/23/20 

at 07:28;  Stop 3/23/20 at 13:27;  Status DC


Albumin Human 200 ml @  200 mls/hr 1X  ONCE IV  Last administered on 3/23/20at 

08:51;  Start 3/23/20 at 07:30;  Stop 3/23/20 at 08:29;  Status DC


Diphenhydramine HCl (Benadryl) 25 mg 1X PRN  PRN IV ITCHING;  Start 3/23/20 at 

07:30;  Stop 3/24/20 at 07:29;  Status DC


Diphenhydramine HCl (Benadryl) 25 mg 1X PRN  PRN IV ITCHING;  Start 3/23/20 at 

07:30;  Stop 3/24/20 at 07:29;  Status DC


Sodium Chloride 1,000 ml @  400 mls/hr Q2H30M PRN IV PATENCY;  Start 3/23/20 at 

07:28;  Stop 3/23/20 at 19:27;  Status DC


Info (PHARMACY MONITORING -- do not chart) 1 each PRN DAILY  PRN MC SEE 

COMMENTS;  Start 3/23/20 at 07:30;  Stop 4/3/20 at 13:01;  Status DC


Metronidazole 100 ml @  100 mls/hr Q6HRS IV  Last administered on 4/8/20at 

06:26;  Start 3/23/20 at 08:30;  Stop 4/8/20 at 09:58;  Status DC


Micafungin Sodium 100 mg/Dextrose 100 ml @  100 mls/hr Q24H IV  Last 

administered on 4/30/20at 08:18;  Start 3/23/20 at 09:00;  Stop 4/30/20 at 2

0:58;  Status DC


Propofol 0 ml @ As Directed STK-MED ONCE IV ;  Start 3/23/20 at 07:53;  Stop 

3/23/20 at 07:53;  Status DC


Etomidate (Amidate) 20 mg STK-MED ONCE IV ;  Start 3/23/20 at 07:53;  Stop 

3/23/20 at 07:54;  Status DC


Midazolam HCl (Versed) 5 mg STK-MED ONCE .ROUTE ;  Start 3/23/20 at 07:57;  Stop

3/23/20 at 07:57;  Status DC


Fentanyl Citrate 30 ml @ 0 mls/hr CONT  PRN IV SEE PROTOCOL Last administered on

4/17/20at 06:12;  Start 3/23/20 at 08:15;  Stop 4/17/20 at 09:19;  Status DC


Artificial Tears (Artificial Tears) 1 drop PRN Q1HR  PRN OU DRY EYE, 1st choice;

 Start 3/23/20 at 08:15;  Stop 4/29/20 at 05:31;  Status DC


Midazolam HCl 50 mg/Sodium Chloride 50 ml @ 0 mls/hr CONT  PRN IV SEE PROTOCOL 

Last administered on 3/26/20at 22:39;  Start 3/23/20 at 08:15;  Stop 3/28/20 at 

15:59;  Status DC


Etomidate (Amidate) 8 mg 1X  ONCE IV  Last administered on 3/23/20at 08:33;  

Start 3/23/20 at 08:30;  Stop 3/23/20 at 08:31;  Status DC


Succinylcholine Chloride (Anectine) 120 mg 1X  ONCE IV  Last administered on 

3/23/20at 08:34;  Start 3/23/20 at 08:30;  Stop 3/23/20 at 08:31;  Status DC


Midazolam HCl (Versed) 5 mg 1X  ONCE IV ;  Start 3/23/20 at 08:30;  Stop 3/23/20

at 08:31;  Status DC


Potassium Chloride 15 meq/ Bicarbonate Dialysis Soln w/ out KCl 5,007.5 ml  @ 

1,000 mls/ hr Q5H1M IV  Last administered on 3/24/20at 11:11;  Start 3/23/20 at 

12:00;  Stop 3/24/20 at 11:15;  Status DC


Potassium Chloride 15 meq/ Bicarbonate Dialysis Soln w/ out KCl 5,007.5 ml  @ 

1,000 mls/ hr Q5H1M IV  Last administered on 3/24/20at 11:12;  Start 3/23/20 at 

12:00;  Stop 3/24/20 at 11:17;  Status DC


Potassium Chloride 15 meq/ Bicarbonate Dialysis Soln w/ out KCl 5,007.5 ml  @ 

1,000 mls/ hr Q5H1M IV  Last administered on 3/24/20at 11:11;  Start 3/23/20 at 

12:00;  Stop 3/24/20 at 11:19;  Status DC


Sodium Chloride 90 meq/Potassium Chloride 15 meq/ Potassium Phosphate 10 mmol/ 

Magnesium Sulfate 10 meq/Calcium Gluconate 20 meq/ Multivitamins 10 ml/Chromium/

Copper/Manganese/ Seleni/Zn 0.5 ml/ Total Parenteral Nutrition/Amino 

Acids/Dextrose/ Fat Emulsion Intravenous 1,400 ml @  58.333 mls/ hr TPN  CONT IV

 Last administered on 3/23/20at 21:42;  Start 3/23/20 at 22:00;  Stop 3/24/20 at

21:59;  Status DC


Heparin Sodium (Porcine) (Heparin Sodium) 5,000 unit Q8HRS SQ  Last administered

on 3/28/20at 05:55;  Start 3/23/20 at 15:00;  Stop 3/28/20 at 13:28;  Status DC


Meropenem 500 mg/ Sodium Chloride 50 ml @  100 mls/hr Q6HRS IV  Last 

administered on 3/25/20at 06:00;  Start 3/24/20 at 09:00;  Stop 3/25/20 at 

07:29;  Status DC


Potassium Phosphate 20 mmol/ Sodium Chloride 106.6667 ml @  51.667 m... 1X  ONCE

IV  Last administered on 3/24/20at 11:22;  Start 3/24/20 at 10:15;  Stop 3/24/20

at 12:18;  Status DC


Acetaminophen (Tylenol Supp) 650 mg PRN Q6HRS  PRN LA MILD PAIN / TEMP > 100.3'F

Last administered on 6/18/20at 10:16;  Start 3/24/20 at 10:30


Potassium Chloride/Water 100 ml @  100 mls/hr Q1H IV  Last administered on 

3/24/20at 12:12;  Start 3/24/20 at 11:00;  Stop 3/24/20 at 12:59;  Status DC


Potassium Chloride 20 meq/ Bicarbonate Dialysis Soln w/ out KCl 5,010 ml @  

1,000 mls/hr Q5H1M IV  Last administered on 3/25/20at 08:48;  Start 3/24/20 at 

12:00;  Stop 3/25/20 at 13:03;  Status DC


Potassium Chloride 20 meq/ Bicarbonate Dialysis Soln w/ out KCl 5,010 ml @  

1,000 mls/hr Q5H1M IV  Last administered on 3/29/20at 14:52;  Start 3/24/20 at 

11:30;  Stop 3/29/20 at 19:59;  Status DC


Potassium Chloride 20 meq/ Bicarbonate Dialysis Soln w/ out KCl 5,010 ml @  

1,000 mls/hr Q5H1M IV  Last administered on 3/29/20at 14:53;  Start 3/24/20 at 

11:30;  Stop 3/29/20 at 19:59;  Status DC


Sodium Chloride 90 meq/Potassium Chloride 15 meq/ Potassium Phosphate 15 mmol/ 

Magnesium Sulfate 10 meq/Calcium Gluconate 15 meq/ Multivitamins 10 ml/Chromium/

Copper/Manganese/ Seleni/Zn 0.5 ml/ Total Parenteral Nutrition/Amino Acids

/Dextrose/ Fat Emulsion Intravenous 1,400 ml @  58.333 mls/ hr TPN  CONT IV  

Last administered on 3/24/20at 22:17;  Start 3/24/20 at 22:00;  Stop 3/25/20 at 

21:59;  Status DC


Cefepime HCl (Maxipime) 2 gm Q12HR IVP  Last administered on 4/7/20at 20:56;  

Start 3/25/20 at 09:00;  Stop 4/8/20 at 09:58;  Status DC


Daptomycin 500 mg/ Sodium Chloride 50 ml @  100 mls/hr Q48H IV  Last admi

nistered on 4/10/20at 09:57;  Start 3/25/20 at 08:30;  Stop 4/10/20 at 10:07;  

Status DC


Lidocaine HCl (Buffered Lidocaine 1%) 3 ml 1X  ONCE INJ  Last administered on 

3/25/20at 10:27;  Start 3/25/20 at 10:30;  Stop 3/25/20 at 10:31;  Status DC


Potassium Phosphate 20 mmol/ Sodium Chloride 106.6667 ml @  51.667 m... 1X  ONCE

IV  Last administered on 3/25/20at 12:51;  Start 3/25/20 at 13:00;  Stop 3/25/20

at 15:03;  Status DC


Sodium Chloride 90 meq/Potassium Chloride 15 meq/ Potassium Phosphate 18 mmol/ 

Magnesium Sulfate 8 meq/Calcium Gluconate 15 meq/ Multivitamins 10 ml/Chromium/ 

Copper/Manganese/ Seleni/Zn 0.5 ml/ Total Parenteral Nutrition/Amino 

Acids/Dextrose/ Fat Emulsion Intravenous 1,400 ml @  58.333 mls/ hr TPN  CONT IV

 Last administered on 3/25/20at 22:16;  Start 3/25/20 at 22:00;  Stop 3/26/20 at

21:59;  Status DC


Potassium Chloride 20 meq/ Bicarbonate Dialysis Soln w/ out KCl 5,010 ml @  

1,000 mls/hr Q5H1M IV  Last administered on 3/29/20at 14:54;  Start 3/25/20 at 

16:00;  Stop 3/29/20 at 19:59;  Status DC


Multi-Ingred Cream/Lotion/Oil/ Oint (Artificial Tears Eye Ointment) 1 thomas PRN 

Q1HR  PRN OU DRY EYE, 2nd choice Last administered on 4/13/20at 08:19;  Start 

3/25/20 at 17:30;  Stop 6/3/20 at 14:39;  Status DC


Sodium Chloride 90 meq/Potassium Chloride 15 meq/ Potassium Phosphate 18 mmol/ 

Magnesium Sulfate 8 meq/Calcium Gluconate 15 meq/ Multivitamins 10 ml/Chromium/ 

Copper/Manganese/ Seleni/Zn 0.5 ml/ Total Parenteral Nutrition/Amino 

Acids/Dextrose/ Fat Emulsion Intravenous 1,400 ml @  58.333 mls/ hr TPN  CONT IV

 Last administered on 3/26/20at 22:00;  Start 3/26/20 at 22:00;  Stop 3/27/20 at

21:59;  Status DC


Albumin Human 500 ml @  125 mls/hr 1X  ONCE IV ;  Start 3/26/20 at 14:15;  Stop 

3/26/20 at 18:14;  Status DC


Sodium Chloride 90 meq/Potassium Chloride 15 meq/ Potassium Phosphate 18 mmol/ 

Magnesium Sulfate 8 meq/Calcium Gluconate 15 meq/ Multivitamins 10 ml/Chromium/ 

Copper/Manganese/ Seleni/Zn 0.5 ml/ Insulin Human Regular 10 unit/ Total 

Parenteral Nutrition/Amino Acids/Dextrose/ Fat Emulsion Intravenous 1,400 ml @  

58.333 mls/ hr TPN  CONT IV  Last administered on 3/27/20at 21:43;  Start 

3/27/20 at 22:00;  Stop 3/28/20 at 21:59;  Status DC


Lidocaine HCl (Buffered Lidocaine 1%) 3 ml STK-MED ONCE .ROUTE ;  Start 3/25/20 

at 10:00;  Stop 3/27/20 at 13:57;  Status DC


Midazolam HCl 100 mg/Sodium Chloride 100 ml @ 7 mls/hr CONT  PRN IV SEE PROTOCOL

Last administered on 4/8/20at 15:35;  Start 3/28/20 at 16:00;  Stop 6/3/20 at 

14:38;  Status DC


Sodium Chloride 90 meq/Potassium Chloride 15 meq/ Potassium Phosphate 18 mmol/ 

Magnesium Sulfate 8 meq/Calcium Gluconate 15 meq/ Multivitamins 10 ml/Chromium/ 

Copper/Manganese/ Seleni/Zn 0.5 ml/ Insulin Human Regular 15 unit/ Total 

Parenteral Nutrition/Amino Acids/Dextrose/ Fat Emulsion Intravenous 1,400 ml @  

58.333 mls/ hr TPN  CONT IV  Last administered on 3/28/20at 20:34;  Start 

3/28/20 at 22:00;  Stop 3/29/20 at 21:59;  Status DC


Info ([facility] Electrolyte Protocol) 1 ea CONT PRN  PRN MC PER PROTOCOL;  Start 

3/29/20 at 13:15


Sodium Chloride 90 meq/Potassium Chloride 15 meq/ Potassium Phosphate 18 mmol/ 

Magnesium Sulfate 8 meq/Calcium Gluconate 15 meq/ Multivitamins 10 ml/Chromium/ 

Copper/Manganese/ Seleni/Zn 0.5 ml/ Insulin Human Regular 15 unit/ Total 

Parenteral Nutrition/Amino Acids/Dextrose/ Fat Emulsion Intravenous 1,400 ml @  

58.333 mls/ hr TPN  CONT IV  Last administered on 3/29/20at 22:05;  Start 

3/29/20 at 22:00;  Stop 3/30/20 at 21:59;  Status DC


Potassium Chloride 15 meq/ Bicarbonate Dialysis Soln w/ out KCl 5,007.5 ml  @ 

1,000 mls/ hr Q5H1M IV  Last administered on 4/1/20at 18:14;  Start 3/29/20 at 

20:00;  Stop 4/2/20 at 13:08;  Status DC


Potassium Chloride 15 meq/ Bicarbonate Dialysis Soln w/ out KCl 5,007.5 ml  @ 

1,000 mls/ hr Q5H1M IV  Last administered on 4/1/20at 18:14;  Start 3/29/20 at 

20:00;  Stop 4/2/20 at 13:08;  Status DC


Potassium Chloride 15 meq/ Bicarbonate Dialysis Soln w/ out KCl 5,007.5 ml  @ 

1,000 mls/ hr Q5H1M IV  Last administered on 4/1/20at 18:14;  Start 3/29/20 at 

20:00;  Stop 4/2/20 at 13:08;  Status DC


Iohexol (Omnipaque 240 Mg/ml) 30 ml 1X  ONCE PO  Last administered on 3/30/20at 

11:30;  Start 3/30/20 at 11:30;  Stop 3/30/20 at 11:33;  Status DC


Info (CONTRAST GIVEN -- Rx MONITORING) 1 each PRN DAILY  PRN MC SEE COMMENTS;  

Start 3/30/20 at 11:45;  Stop 4/1/20 at 11:44;  Status DC


Sodium Chloride 90 meq/Potassium Chloride 15 meq/ Potassium Phosphate 18 mmol/ 

Magnesium Sulfate 8 meq/Calcium Gluconate 15 meq/ Multivitamins 10 ml/Chromium/ 

Copper/Manganese/ Seleni/Zn 0.5 ml/ Insulin Human Regular 15 unit/ Total 

Parenteral Nutrition/Amino Acids/Dextrose/ Fat Emulsion Intravenous 1,400 ml @  

58.333 mls/ hr TPN  CONT IV  Last administered on 3/30/20at 21:47;  Start 3/30

/20 at 22:00;  Stop 3/31/20 at 21:59;  Status DC


Sodium Chloride 90 meq/Potassium Chloride 15 meq/ Potassium Phosphate 18 mmol/ 

Magnesium Sulfate 8 meq/Calcium Gluconate 15 meq/ Multivitamins 10 ml/Chromium/ 

Copper/Manganese/ Seleni/Zn 0.5 ml/ Insulin Human Regular 20 unit/ Total 

Parenteral Nutrition/Amino Acids/Dextrose/ Fat Emulsion Intravenous 1,400 ml @  

58.333 mls/ hr TPN  CONT IV  Last administered on 3/31/20at 21:36;  Start 

3/31/20 at 22:00;  Stop 4/1/20 at 21:59;  Status DC


Alteplase, Recombinant (Cathflo For Central Catheter Clearance) 1 mg 1X  ONCE 

INT CAT  Last administered on 3/31/20at 20:03;  Start 3/31/20 at 19:30;  Stop 

3/31/20 at 19:46;  Status DC


Alteplase, Recombinant (Cathflo For Central Catheter Clearance) 1 mg 1X  ONCE 

INT CAT  Last administered on 3/31/20at 22:05;  Start 3/31/20 at 22:00;  Stop 

3/31/20 at 22:01;  Status DC


Sodium Chloride 90 meq/Potassium Chloride 15 meq/ Potassium Phosphate 18 mmol/ 

Magnesium Sulfate 8 meq/Calcium Gluconate 15 meq/ Multivitamins 10 ml/Chromium/ 

Copper/Manganese/ Seleni/Zn 0.5 ml/ Insulin Human Regular 20 unit/ Total 

Parenteral Nutrition/Amino Acids/Dextrose/ Fat Emulsion Intravenous 1,400 ml @  

58.333 mls/ hr TPN  CONT IV  Last administered on 4/1/20at 21:30;  Start 4/1/20 

at 22:00;  Stop 4/2/20 at 21:59;  Status DC


Dexmedetomidine HCl 400 mcg/ Sodium Chloride 100 ml @ 0 mls/hr CONT  PRN IV 

ANXIETY / AGITATION Last administered on 5/30/20at 12:57;  Start 4/2/20 at 

08:15;  Stop 5/30/20 at 18:31;  Status DC


Sodium Chloride 500 ml @  500 mls/hr 1X PRN  PRN IV ELEVATED BP, SEE COMMENTS;  

Start 4/2/20 at 08:15


Atropine Sulfate (ATROPINE 0.5mg SYRINGE) 0.5 mg PRN Q5MIN  PRN IV SEE COMMENTS;

 Start 4/2/20 at 08:15


Furosemide (Lasix) 20 mg 1X  ONCE IVP  Last administered on 4/2/20at 08:19;  

Start 4/2/20 at 08:15;  Stop 4/2/20 at 08:16;  Status DC


Lidocaine HCl (Buffered Lidocaine 1%) 3 ml STK-MED ONCE .ROUTE ;  Start 4/2/20 

at 08:39;  Stop 4/2/20 at 08:39;  Status DC


Lidocaine HCl (Buffered Lidocaine 1%) 6 ml 1X  ONCE INJ  Last administered on 

4/2/20at 09:05;  Start 4/2/20 at 09:00;  Stop 4/2/20 at 09:06;  Status DC


Sodium Chloride 90 meq/Potassium Chloride 15 meq/ Potassium Phosphate 18 mmol/ 

Magnesium Sulfate 8 meq/Calcium Gluconate 15 meq/ Multivitamins 10 ml/Chromium/ 

Copper/Manganese/ Seleni/Zn 0.5 ml/ Insulin Human Regular 20 unit/ Total 

Parenteral Nutrition/Amino Acids/Dextrose/ Fat Emulsion Intravenous 1,400 ml @  

58.333 mls/ hr TPN  CONT IV  Last administered on 4/2/20at 22:45;  Start 4/2/20 

at 22:00;  Stop 4/3/20 at 21:59;  Status DC


Sodium Chloride 1,000 ml @  1,000 mls/hr Q1H PRN IV hypotension;  Start 4/3/20 

at 07:30;  Stop 4/3/20 at 13:29;  Status DC


Albumin Human 200 ml @  200 mls/hr 1X PRN  PRN IV Hypotension Last administered 

on 4/3/20at 09:36;  Start 4/3/20 at 07:30;  Stop 4/3/20 at 13:29;  Status DC


Sodium Chloride (Normal Saline Flush) 10 ml 1X PRN  PRN IV AP catheter pack;  

Start 4/3/20 at 07:30;  Stop 4/3/20 at 21:29;  Status DC


Sodium Chloride (Normal Saline Flush) 10 ml 1X PRN  PRN IV  catheter pack;  

Start 4/3/20 at 07:30;  Stop 4/4/20 at 07:29;  Status DC


Sodium Chloride 1,000 ml @  400 mls/hr Q2H30M PRN IV PATENCY;  Start 4/3/20 at 

07:30;  Stop 4/3/20 at 19:29;  Status DC


Info (PHARMACY MONITORING -- do not chart) 1 each PRN DAILY  PRN MC SEE 

COMMENTS;  Start 4/3/20 at 07:30;  Stop 4/3/20 at 13:02;  Status DC


Info (PHARMACY MONITORING -- do not chart) 1 each PRN DAILY  PRN MC SEE 

COMMENTS;  Start 4/3/20 at 07:30;  Stop 4/5/20 at 12:45;  Status DC


Sodium Chloride 90 meq/Potassium Chloride 15 meq/ Potassium Phosphate 10 mmol/ 

Magnesium Sulfate 8 meq/Calcium Gluconate 15 meq/ Multivitamins 10 ml/Chromium/ 

Copper/Manganese/ Seleni/Zn 0.5 ml/ Insulin Human Regular 25 unit/ Total 

Parenteral Nutrition/Amino Acids/Dextrose/ Fat Emulsion Intravenous 1,400 ml @  

58.333 mls/ hr TPN  CONT IV  Last administered on 4/3/20at 22:19;  Start 4/3/20 

at 22:00;  Stop 4/4/20 at 21:59;  Status DC


Heparin Sodium (Porcine) (Heparin Sodium) 5,000 unit Q12HR SQ  Last administered

on 4/26/20at 08:59;  Start 4/3/20 at 21:00;  Stop 4/26/20 at 10:05;  Status DC


Ondansetron HCl (Zofran) 4 mg PRN Q6HRS  PRN IV NAUSEA/VOMITING;  Start 4/6/20 

at 07:00;  Stop 4/7/20 at 06:59;  Status DC


Fentanyl Citrate (Fentanyl 2ml Vial) 25 mcg PRN Q5MIN  PRN IV MILD PAIN 1-3;  

Start 4/6/20 at 07:00;  Stop 4/7/20 at 06:59;  Status DC


Fentanyl Citrate (Fentanyl 2ml Vial) 50 mcg PRN Q5MIN  PRN IV MODERATE TO SEVERE

PAIN;  Start 4/6/20 at 07:00;  Stop 4/7/20 at 06:59;  Status DC


Ringer's Solution 1,000 ml @  30 mls/hr Q24H IV ;  Start 4/6/20 at 07:00;  Stop 

4/6/20 at 18:59;  Status DC


Lidocaine HCl (Xylocaine-Mpf 1% 2ml Vial) 2 ml PRN 1X  PRN ID PRIOR TO IV START;

 Start 4/6/20 at 07:00;  Stop 4/7/20 at 06:59;  Status DC


Prochlorperazine Edisylate (Compazine) 5 mg PACU PRN  PRN IV NAUSEA, MRX1;  

Start 4/6/20 at 07:00;  Stop 4/7/20 at 06:59;  Status DC


Sodium Chloride 1,000 ml @  1,000 mls/hr Q1H PRN IV hypotension;  Start 4/4/20 

at 09:10;  Stop 4/4/20 at 15:09;  Status DC


Albumin Human 200 ml @  200 mls/hr 1X PRN  PRN IV Hypotension Last administered 

on 4/4/20at 10:10;  Start 4/4/20 at 09:15;  Stop 4/4/20 at 15:14;  Status DC


Sodium Chloride 1,000 ml @  400 mls/hr Q2H30M PRN IV PATENCY;  Start 4/4/20 at 

09:10;  Stop 4/4/20 at 21:09;  Status DC


Info (PHARMACY MONITORING -- do not chart) 1 each PRN DAILY  PRN MC SEE 

COMMENTS;  Start 4/4/20 at 09:15;  Stop 4/5/20 at 12:45;  Status DC


Info (PHARMACY MONITORING -- do not chart) 1 each PRN DAILY  PRN MC SEE 

COMMENTS;  Start 4/4/20 at 09:15;  Stop 4/5/20 at 12:45;  Status DC


Sodium Chloride 90 meq/Potassium Chloride 15 meq/ Potassium Phosphate 10 mmol/ 

Magnesium Sulfate 8 meq/Calcium Gluconate 15 meq/ Multivitamins 10 ml/Chromium/ 

Copper/Manganese/ Seleni/Zn 0.5 ml/ Insulin Human Regular 25 unit/ Total 

Parenteral Nutrition/Amino Acids/Dextrose/ Fat Emulsion Intravenous 1,400 ml @  

58.333 mls/ hr TPN  CONT IV  Last administered on 4/4/20at 22:10;  Start 4/4/20 

at 22:00;  Stop 4/5/20 at 21:59;  Status DC


Magnesium Sulfate 50 ml @ 25 mls/hr PRN DAILY  PRN IV for Mag < 1.7 on am labs 

Last administered on 6/18/20at 10:57;  Start 4/5/20 at 09:15


Sodium Chloride 90 meq/Potassium Chloride 15 meq/ Potassium Phosphate 10 mmol/ 

Magnesium Sulfate 8 meq/Calcium Gluconate 15 meq/ Multivitamins 10 ml/Chromium/ 

Copper/Manganese/ Seleni/Zn 0.5 ml/ Insulin Human Regular 25 unit/ Total 

Parenteral Nutrition/Amino Acids/Dextrose/ Fat Emulsion Intravenous 1,400 ml @  

58.333 mls/ hr TPN  CONT IV  Last administered on 4/5/20at 21:20;  Start 4/5/20 

at 22:00;  Stop 4/6/20 at 21:59;  Status DC


Sodium Chloride 1,000 ml @  1,000 mls/hr Q1H PRN IV hypotension;  Start 4/5/20 

at 12:23;  Stop 4/5/20 at 18:22;  Status DC


Albumin Human 200 ml @  200 mls/hr 1X  ONCE IV  Last administered on 4/5/20at 

13:34;  Start 4/5/20 at 12:30;  Stop 4/5/20 at 13:29;  Status DC


Diphenhydramine HCl (Benadryl) 25 mg 1X PRN  PRN IV ITCHING;  Start 4/5/20 at 

12:30;  Stop 4/6/20 at 12:29;  Status DC


Diphenhydramine HCl (Benadryl) 25 mg 1X PRN  PRN IV ITCHING;  Start 4/5/20 at 

12:30;  Stop 4/6/20 at 12:29;  Status DC


Info (PHARMACY MONITORING -- do not chart) 1 each PRN DAILY  PRN MC SEE 

COMMENTS;  Start 4/5/20 at 12:30;  Status Cancel


Bupivacaine HCl/ Epinephrine Bitart (Sensorcain-Epi 0.5%-1:[id number] Mpf) 30 ml 

STK-MED ONCE .ROUTE  Last administered on 4/6/20at 11:44;  Start 4/6/20 at 

11:00;  Stop 4/6/20 at 11:01;  Status DC


Cellulose (Surgicel Fibrillar 1x2) 1 each STK-MED ONCE .ROUTE ;  Start 4/6/20 at

11:00;  Stop 4/6/20 at 11:01;  Status DC


Sodium Chloride 90 meq/Potassium Chloride 15 meq/ Potassium Phosphate 10 mmol/ 

Magnesium Sulfate 12 meq/Calcium Gluconate 15 meq/ Multivitamins 10 ml/Chromium/

Copper/Manganese/ Seleni/Zn 0.5 ml/ Insulin Human Regular 25 unit/ Total 

Parenteral Nutrition/Amino Acids/Dextrose/ Fat Emulsion Intravenous 1,400 ml @  

58.333 mls/ hr TPN  CONT IV  Last administered on 4/6/20at 22:24;  Start 4/6/20 

at 22:00;  Stop 4/7/20 at 21:59;  Status DC


Propofol 20 ml @ As Directed STK-MED ONCE IV ;  Start 4/6/20 at 11:07;  Stop 

4/6/20 at 11:07;  Status DC


Cellulose (Surgicel Hemostat 4x8) 1 each STK-MED ONCE .ROUTE  Last administered 

on 4/6/20at 11:44;  Start 4/6/20 at 11:55;  Stop 4/6/20 at 11:56;  Status DC


Sevoflurane (Ultane) 60 ml STK-MED ONCE IH ;  Start 4/6/20 at 12:46;  Stop 

4/6/20 at 12:46;  Status DC


Sodium Chloride 1,000 ml @  1,000 mls/hr Q1H PRN IV hypotension;  Start 4/6/20 

at 13:51;  Stop 4/6/20 at 19:50;  Status DC


Albumin Human 200 ml @  200 mls/hr 1X PRN  PRN IV Hypotension Last administered 

on 4/6/20at 14:51;  Start 4/6/20 at 14:00;  Stop 4/6/20 at 19:59;  Status DC


Diphenhydramine HCl (Benadryl) 25 mg 1X PRN  PRN IV ITCHING;  Start 4/6/20 at 

14:00;  Stop 4/7/20 at 13:59;  Status DC


Diphenhydramine HCl (Benadryl) 25 mg 1X PRN  PRN IV ITCHING;  Start 4/6/20 at 

14:00;  Stop 4/7/20 at 13:59;  Status DC


Sodium Chloride 1,000 ml @  400 mls/hr Q2H30M PRN IV PATENCY;  Start 4/6/20 at 

13:51;  Stop 4/7/20 at 01:50;  Status DC


Info (PHARMACY MONITORING -- do not chart) 1 each PRN DAILY  PRN MC SEE 

COMMENTS;  Start 4/6/20 at 14:00;  Stop 4/9/20 at 08:16;  Status DC


Heparin Sodium (Porcine) (Hep Lock Adult) 500 unit STK-MED ONCE IVP ;  Start 

4/7/20 at 09:29;  Stop 4/7/20 at 09:30;  Status DC


Sodium Chloride 1,000 ml @  1,000 mls/hr Q1H PRN IV hypotension;  Start 4/7/20 

at 10:43;  Stop 4/7/20 at 16:42;  Status DC


Sodium Chloride 1,000 ml @  400 mls/hr Q2H30M PRN IV PATENCY;  Start 4/7/20 at 

10:43;  Stop 4/7/20 at 22:42;  Status DC


Info (PHARMACY MONITORING -- do not chart) 1 each PRN DAILY  PRN MC SEE 

COMMENTS;  Start 4/7/20 at 10:45;  Status UNV


Info (PHARMACY MONITORING -- do not chart) 1 each PRN DAILY  PRN MC SEE 

COMMENTS;  Start 4/7/20 at 10:45;  Status UNV


Sodium Chloride 90 meq/Potassium Chloride 15 meq/ Magnesium Sulfate 12 

meq/Calcium Gluconate 15 meq/ Multivitamins 10 ml/Chromium/ Copper/Manganese/ 

Seleni/Zn 0.5 ml/ Insulin Human Regular 25 unit/ Total Parenteral 

Nutrition/Amino Acids/Dextrose/ Fat Emulsion Intravenous 1,400 ml @  58.333 mls/

hr TPN  CONT IV  Last administered on 4/7/20at 22:13;  Start 4/7/20 at 22:00;  

Stop 4/8/20 at 21:59;  Status DC


Sodium Chloride 1,000 ml @  1,000 mls/hr Q1H PRN IV hypotension;  Start 4/8/20 

at 07:50;  Stop 4/8/20 at 13:49;  Status DC


Albumin Human 200 ml @  200 mls/hr 1X  ONCE IV ;  Start 4/8/20 at 08:00;  Stop 

4/8/20 at 08:53;  Status DC


Diphenhydramine HCl (Benadryl) 25 mg 1X PRN  PRN IV ITCHING;  Start 4/8/20 at 

08:00;  Stop 4/9/20 at 07:59;  Status DC


Diphenhydramine HCl (Benadryl) 25 mg 1X PRN  PRN IV ITCHING;  Start 4/8/20 at 

08:00;  Stop 4/9/20 at 07:59;  Status DC


Info (PHARMACY MONITORING -- do not chart) 1 each PRN DAILY  PRN MC SEE 

COMMENTS;  Start 4/8/20 at 08:00;  Stop 4/9/20 at 08:16;  Status DC


Albumin Human 50 ml @ 50 mls/hr 1X  ONCE IV ;  Start 4/8/20 at 08:53;  Stop 

4/8/20 at 08:56;  Status DC


Albumin Human 200 ml @  50 mls/hr PRN 1X  PRN IV HYPOTENSION Last administered 

on 4/14/20at 11:54;  Start 4/8/20 at 09:00;  Stop 5/21/20 at 11:14;  Status DC


Meropenem 500 mg/ Sodium Chloride 50 ml @  100 mls/hr Q12H IV  Last administered

on 4/28/20at 10:45;  Start 4/8/20 at 10:00;  Stop 4/28/20 at 12:37;  Status DC


Sodium Chloride 90 meq/Magnesium Sulfate 12 meq/ Calcium Gluconate 15 meq/ 

Multivitamins 10 ml/Chromium/ Copper/Manganese/ Seleni/Zn 0.5 ml/ Insulin Human 

Regular 25 unit/ Total Parenteral Nutrition/Amino Acids/Dextrose/ Fat Emulsion 

Intravenous 1,400 ml @  58.333 mls/ hr TPN  CONT IV  Last administered on 

4/8/20at 21:41;  Start 4/8/20 at 22:00;  Stop 4/9/20 at 21:59;  Status DC


Sodium Chloride 1,000 ml @  1,000 mls/hr Q1H PRN IV hypotension;  Start 4/9/20 

at 07:58;  Stop 4/9/20 at 13:57;  Status DC


Albumin Human 200 ml @  200 mls/hr 1X PRN  PRN IV Hypotension Last administered 

on 4/9/20at 09:30;  Start 4/9/20 at 08:00;  Stop 4/9/20 at 13:59;  Status DC


Sodium Chloride 1,000 ml @  400 mls/hr Q2H30M PRN IV PATENCY;  Start 4/9/20 at 

07:58;  Stop 4/9/20 at 19:57;  Status DC


Info (PHARMACY MONITORING -- do not chart) 1 each PRN DAILY  PRN MC SEE 

COMMENTS;  Start 4/9/20 at 08:00;  Status Cancel


Info (PHARMACY MONITORING -- do not chart) 1 each PRN DAILY  PRN MC SEE 

COMMENTS;  Start 4/9/20 at 08:15;  Status UNV


Sodium Chloride 90 meq/Potassium Phosphate 5 mmol/ Magnesium Sulfate 12 

meq/Calcium Gluconate 15 meq/ Multivitamins 10 ml/Chromium/ Copper/Manganese/ 

Seleni/Zn 0.5 ml/ Insulin Human Regular 30 unit/ Total Parenteral 

Nutrition/Amino Acids/Dextrose/ Fat Emulsion Intravenous 1,400 ml @  58.333 mls/

hr TPN  CONT IV  Last administered on 4/9/20at 22:08;  Start 4/9/20 at 22:00;  

Stop 4/10/20 at 21:59;  Status DC


Linezolid/Dextrose 300 ml @  300 mls/hr Q12HR IV  Last administered on 4/20/20at

20:40;  Start 4/10/20 at 11:00;  Stop 4/21/20 at 08:10;  Status DC


Sodium Chloride 90 meq/Potassium Phosphate 15 mmol/ Magnesium Sulfate 12 

meq/Calcium Gluconate 15 meq/ Multivitamins 10 ml/Chromium/ Copper/Manganese/ 

Seleni/Zn 0.5 ml/ Insulin Human Regular 30 unit/ Total Parenteral Nutrition

/Amino Acids/Dextrose/ Fat Emulsion Intravenous 1,400 ml @  58.333 mls/ hr TPN  

CONT IV  Last administered on 4/10/20at 21:49;  Start 4/10/20 at 22:00;  Stop 

4/11/20 at 21:59;  Status DC


Sodium Chloride 90 meq/Potassium Phosphate 15 mmol/ Magnesium Sulfate 12 

meq/Calcium Gluconate 15 meq/ Multivitamins 10 ml/Chromium/ Copper/Manganese/ 

Seleni/Zn 0.5 ml/ Insulin Human Regular 40 unit/ Total Parenteral 

Nutrition/Amino Acids/Dextrose/ Fat Emulsion Intravenous 1,400 ml @  58.333 mls/

hr TPN  CONT IV  Last administered on 4/11/20at 21:21;  Start 4/11/20 at 22:00; 

Stop 4/12/20 at 21:59;  Status DC


Sodium Chloride 1,000 ml @  1,000 mls/hr Q1H PRN IV hypotension;  Start 4/11/20 

at 13:26;  Stop 4/11/20 at 19:25;  Status DC


Albumin Human 200 ml @  200 mls/hr 1X PRN  PRN IV Hypotension Last administered 

on 4/11/20at 15:00;  Start 4/11/20 at 13:30;  Stop 4/11/20 at 19:29;  Status DC


Sodium Chloride (Normal Saline Flush) 10 ml 1X PRN  PRN IV AP catheter pack;  

Start 4/11/20 at 13:30;  Stop 4/12/20 at 13:29;  Status DC


Sodium Chloride (Normal Saline Flush) 10 ml 1X PRN  PRN IV  catheter pack;  

Start 4/11/20 at 13:30;  Stop 4/12/20 at 13:29;  Status DC


Sodium Chloride 1,000 ml @  400 mls/hr Q2H30M PRN IV PATENCY;  Start 4/11/20 at 

13:26;  Stop 4/12/20 at 01:25;  Status DC


Info (PHARMACY MONITORING -- do not chart) 1 each PRN DAILY  PRN MC SEE 

COMMENTS;  Start 4/11/20 at 13:30;  Stop 4/11/20 at 13:33;  Status DC


Info (PHARMACY MONITORING -- do not chart) 1 each PRN DAILY  PRN MC SEE 

COMMENTS;  Start 4/11/20 at 13:30;  Stop 4/11/20 at 13:34;  Status DC


Sodium Chloride 90 meq/Potassium Phosphate 19 mmol/ Magnesium Sulfate 12 

meq/Calcium Gluconate 15 meq/ Multivitamins 10 ml/Chromium/ Copper/Manganese/ 

Seleni/Zn 0.5 ml/ Insulin Human Regular 40 unit/ Total Parenteral Nutrition/

Amino Acids/Dextrose/ Fat Emulsion Intravenous 1,400 ml @  58.333 mls/ hr TPN  

CONT IV  Last administered on 4/12/20at 21:54;  Start 4/12/20 at 22:00;  Stop 

4/13/20 at 21:59;  Status DC


Sodium Chloride 1,000 ml @  1,000 mls/hr Q1H PRN IV hypotension;  Start 4/13/20 

at 09:35;  Stop 4/13/20 at 15:34;  Status DC


Albumin Human 200 ml @  200 mls/hr 1X PRN  PRN IV Hypotension;  Start 4/13/20 at

09:45;  Stop 4/13/20 at 15:44;  Status DC


Diphenhydramine HCl (Benadryl) 25 mg 1X PRN  PRN IV ITCHING;  Start 4/13/20 at 

09:45;  Stop 4/14/20 at 09:44;  Status DC


Diphenhydramine HCl (Benadryl) 25 mg 1X PRN  PRN IV ITCHING;  Start 4/13/20 at 

09:45;  Stop 4/14/20 at 09:44;  Status DC


Sodium Chloride 1,000 ml @  400 mls/hr Q2H30M PRN IV PATENCY;  Start 4/13/20 at 

09:35;  Stop 4/13/20 at 21:34;  Status DC


Info (PHARMACY MONITORING -- do not chart) 1 each PRN DAILY  PRN MC SEE 

COMMENTS;  Start 4/13/20 at 09:45;  Status Cancel


Sodium Chloride 100 meq/Potassium Phosphate 19 mmol/ Magnesium Sulfate 12 

meq/Calcium Gluconate 15 meq/ Multivitamins 10 ml/Chromium/ Copper/Manganese/ 

Seleni/Zn 0.5 ml/ Insulin Human Regular 40 unit/ Potassium Chloride 20 meq/ Tota

l Parenteral Nutrition/Amino Acids/Dextrose/ Fat Emulsion Intravenous 1,400 ml @

 58.333 mls/ hr TPN  CONT IV  Last administered on 4/13/20at 22:02;  Start 

4/13/20 at 22:00;  Stop 4/14/20 at 21:59;  Status DC


Furosemide (Lasix) 40 mg 1X  ONCE IVP  Last administered on 4/13/20at 14:39;  

Start 4/13/20 at 14:30;  Stop 4/13/20 at 14:31;  Status DC


Metronidazole 100 ml @  100 mls/hr Q8HRS IV  Last administered on 4/21/20at 

06:04;  Start 4/14/20 at 10:00;  Stop 4/21/20 at 08:10;  Status DC


Sodium Chloride 1,000 ml @  1,000 mls/hr Q1H PRN IV hypotension;  Start 4/14/20 

at 08:00;  Stop 4/14/20 at 13:59;  Status DC


Albumin Human 200 ml @  200 mls/hr 1X PRN  PRN IV Hypotension;  Start 4/14/20 at

08:00;  Stop 4/14/20 at 13:59;  Status DC


Sodium Chloride 1,000 ml @  400 mls/hr Q2H30M PRN IV PATENCY;  Start 4/14/20 at 

08:00;  Stop 4/14/20 at 19:59;  Status DC


Info (PHARMACY MONITORING -- do not chart) 1 each PRN DAILY  PRN MC SEE 

COMMENTS;  Start 4/14/20 at 11:30;  Status UNV


Info (PHARMACY MONITORING -- do not chart) 1 each PRN DAILY  PRN MC SEE 

COMMENTS;  Start 4/14/20 at 11:30;  Stop 4/16/20 at 12:13;  Status DC


Sodium Chloride 100 meq/Potassium Phosphate 19 mmol/ Magnesium Sulfate 12 

meq/Calcium Gluconate 15 meq/ Multivitamins 10 ml/Chromium/ Copper/Manganese/ 

Seleni/Zn 0.5 ml/ Insulin Human Regular 40 unit/ Potassium Chloride 20 meq/ 

Total Parenteral Nutrition/Amino Acids/Dextrose/ Fat Emulsion Intravenous 1,400 

ml @  58.333 mls/ hr TPN  CONT IV  Last administered on 4/14/20at 21:52;  Start 

4/14/20 at 22:00;  Stop 4/15/20 at 21:59;  Status DC


Sodium Chloride (Normal Saline Flush) 10 ml QSHIFT  PRN IV AFTER MEDS AND BLOOD 

DRAWS;  Start 4/14/20 at 15:00;  Stop 5/12/20 at 11:27;  Status DC


Sodium Chloride (Normal Saline Flush) 10 ml PRN Q5MIN  PRN IV AFTER MEDS AND 

BLOOD DRAWS;  Start 4/14/20 at 15:00


Sodium Chloride (Normal Saline Flush) 20 ml PRN Q5MIN  PRN IV AFTER MEDS AND 

BLOOD DRAWS;  Start 4/14/20 at 15:00


Sodium Chloride 100 meq/Potassium Phosphate 19 mmol/ Magnesium Sulfate 12 

meq/Calcium Gluconate 15 meq/ Multivitamins 10 ml/Chromium/ Copper/Manganese/ 

Seleni/Zn 0.5 ml/ Insulin Human Regular 40 unit/ Potassium Chloride 20 meq/ 

Total Parenteral Nutrition/Amino Acids/Dextrose/ Fat Emulsion Intravenous 1,400 

ml @  58.333 mls/ hr TPN  CONT IV  Last administered on 4/15/20at 21:20;  Start 

4/15/20 at 22:00;  Stop 4/16/20 at 21:59;  Status DC


Lidocaine HCl (Buffered Lidocaine 1%) 3 ml STK-MED ONCE .ROUTE ;  Start 4/15/20 

at 13:16;  Stop 4/15/20 at 13:16;  Status DC


Lidocaine HCl (Buffered Lidocaine 1%) 6 ml 1X  ONCE INJ  Last administered on 

4/15/20at 13:45;  Start 4/15/20 at 13:30;  Stop 4/15/20 at 13:31;  Status DC


Albumin Human 100 ml @  100 mls/hr 1X  ONCE IV  Last administered on 4/15/20at 

15:41;  Start 4/15/20 at 15:00;  Stop 4/15/20 at 15:59;  Status DC


Albumin Human 50 ml @ 50 mls/hr 1X  ONCE IV  Last administered on 4/15/20at 

15:00;  Start 4/15/20 at 15:00;  Stop 4/15/20 at 15:59;  Status DC


Info (PHARMACY MONITORING -- do not chart) 1 each PRN DAILY  PRN MC SEE 

COMMENTS;  Start 4/16/20 at 11:30;  Status Cancel


Info (PHARMACY MONITORING -- do not chart) 1 each PRN DAILY  PRN MC SEE 

COMMENTS;  Start 4/16/20 at 11:30;  Status UNV


Sodium Chloride 100 meq/Potassium Phosphate 10 mmol/ Magnesium Sulfate 12 

meq/Calcium Gluconate 15 meq/ Multivitamins 10 ml/Chromium/ Copper/Manganese/ 

Seleni/Zn 0.5 ml/ Insulin Human Regular 35 unit/ Potassium Chloride 20 meq/ 

Total Parenteral Nutrition/Amino Acids/Dextrose/ Fat Emulsion Intravenous 1,400 

ml @  58.333 mls/ hr TPN  CONT IV  Last administered on 4/16/20at 22:10;  Start 

4/16/20 at 22:00;  Stop 4/17/20 at 21:59;  Status DC


Sodium Chloride 100 meq/Potassium Phosphate 5 mmol/ Magnesium Sulfate 12 

meq/Calcium Gluconate 15 meq/ Multivitamins 10 ml/Chromium/ Copper/Manganese/ 

Seleni/Zn 0.5 ml/ Insulin Human Regular 35 unit/ Potassium Chloride 20 meq/ 

Total Parenteral Nutrition/Amino Acids/Dextrose/ Fat Emulsion Intravenous 1,400 

ml @  58.333 mls/ hr TPN  CONT IV  Last administered on 4/17/20at 22:59;  Start 

4/17/20 at 22:00;  Stop 4/18/20 at 21:59;  Status DC


Sodium Chloride 1,000 ml @  1,000 mls/hr Q1H PRN IV hypotension;  Start 4/18/20 

at 08:27;  Stop 4/18/20 at 14:26;  Status DC


Albumin Human 200 ml @  200 mls/hr 1X PRN  PRN IV Hypotension Last administered 

on 4/18/20at 09:18;  Start 4/18/20 at 08:30;  Stop 4/18/20 at 14:29;  Status DC


Sodium Chloride 1,000 ml @  400 mls/hr Q2H30M PRN IV PATENCY;  Start 4/18/20 at 

08:27;  Stop 4/18/20 at 20:26;  Status DC


Info (PHARMACY MONITORING -- do not chart) 1 each PRN DAILY  PRN MC SEE 

COMMENTS;  Start 4/18/20 at 08:30;  Status Cancel


Info (PHARMACY MONITORING -- do not chart) 1 each PRN DAILY  PRN MC SEE 

COMMENTS;  Start 4/18/20 at 08:30;  Stop 4/26/20 at 13:10;  Status DC


Sodium Chloride 100 meq/Potassium Chloride 40 meq/ Magnesium Sulfate 15 

meq/Calcium Gluconate 15 meq/ Multivitamins 10 ml/Chromium/ Copper/Manganese/ 

Seleni/Zn 0.5 ml/ Insulin Human Regular 35 unit/ Total Parenteral 

Nutrition/Amino Acids/Dextrose/ Fat Emulsion Intravenous 1,400 ml @  58.333 mls/

hr TPN  CONT IV  Last administered on 4/18/20at 22:00;  Start 4/18/20 at 22:00; 

Stop 4/19/20 at 21:59;  Status DC


Potassium Chloride/Water 100 ml @  100 mls/hr 1X  ONCE IV  Last administered on 

4/18/20at 17:28;  Start 4/18/20 at 14:45;  Stop 4/18/20 at 15:44;  Status DC


Sodium Chloride 100 meq/Potassium Chloride 40 meq/ Magnesium Sulfate 15 

meq/Calcium Gluconate 15 meq/ Multivitamins 10 ml/Chromium/ Copper/Manganese/ 

Seleni/Zn 0.5 ml/ Insulin Human Regular 35 unit/ Total Parenteral 

Nutrition/Amino Acids/Dextrose/ Fat Emulsion Intravenous 1,400 ml @  58.333 mls/

hr TPN  CONT IV  Last administered on 4/19/20at 22:46;  Start 4/19/20 at 22:00; 

Stop 4/20/20 at 21:59;  Status DC


Sodium Chloride 100 meq/Potassium Chloride 40 meq/ Magnesium Sulfate 20 

meq/Calcium Gluconate 15 meq/ Multivitamins 10 ml/Chromium/ Copper/Manganese/ 

Seleni/Zn 0.5 ml/ Insulin Human Regular 35 unit/ Total Parenteral 

Nutrition/Amino Acids/Dextrose/ Fat Emulsion Intravenous 1,400 ml @  58.333 mls/

hr TPN  CONT IV  Last administered on 4/20/20at 22:31;  Start 4/20/20 at 22:00; 

Stop 4/21/20 at 21:59;  Status DC


Fentanyl Citrate (Fentanyl 2ml Vial) 50 mcg PRN Q2HR  PRN IVP PAIN Last 

administered on 4/27/20at 13:32;  Start 4/20/20 at 21:00;  Stop 4/28/20 at 

12:53;  Status DC


Fentanyl Citrate (Fentanyl 2ml Vial) 25 mcg PRN Q2HR  PRN IVP PAIN;  Start 

4/20/20 at 21:00;  Stop 4/28/20 at 12:54;  Status DC


Enoxaparin Sodium (Lovenox 100mg Syringe) 100 mg Q12HR SQ ;  Start 4/21/20 at 

21:00;  Status UNV


Amino Acids/ Glycerin/ Electrolytes 1,000 ml @  75 mls/hr [id number] IV ;  Start 

4/20/20 at 21:15;  Status UNV


Sodium Chloride 1,000 ml @  1,000 mls/hr Q1H PRN IV hypotension;  Start 4/21/20 

at 07:56;  Stop 4/21/20 at 13:55;  Status DC


Albumin Human 200 ml @  200 mls/hr 1X PRN  PRN IV Hypotension Last administered 

on 4/21/20at 08:40;  Start 4/21/20 at 08:00;  Stop 4/21/20 at 13:59;  Status DC


Sodium Chloride 1,000 ml @  400 mls/hr Q2H30M PRN IV PATENCY;  Start 4/21/20 at 

07:56;  Stop 4/21/20 at 19:55;  Status DC


Info (PHARMACY MONITORING -- do not chart) 1 each PRN DAILY  PRN MC SEE 

COMMENTS;  Start 4/21/20 at 08:00;  Status UNV


Info (PHARMACY MONITORING -- do not chart) 1 each PRN DAILY  PRN MC SEE 

COMMENTS;  Start 4/21/20 at 08:00;  Status UNV


Daptomycin 430 mg/ Sodium Chloride 50 ml @  100 mls/hr Q24H IV  Last 

administered on 4/21/20at 12:35;  Start 4/21/20 at 09:00;  Stop 4/21/20 at 12:49

;  Status DC


Sodium Chloride 100 meq/Potassium Chloride 40 meq/ Magnesium Sulfate 20 

meq/Calcium Gluconate 15 meq/ Multivitamins 10 ml/Chromium/ Copper/Manganese/ 

Seleni/Zn 0.5 ml/ Insulin Human Regular 35 unit/ Total Parenteral 

Nutrition/Amino Acids/Dextrose/ Fat Emulsion Intravenous 1,400 ml @  58.333 mls/

hr TPN  CONT IV  Last administered on 4/21/20at 21:26;  Start 4/21/20 at 22:00; 

Stop 4/22/20 at 21:59;  Status DC


Daptomycin 430 mg/ Sodium Chloride 50 ml @  100 mls/hr Q48H IV ;  Start 4/23/20 

at 09:00;  Stop 4/22/20 at 11:55;  Status DC


Sodium Chloride 100 meq/Potassium Chloride 40 meq/ Magnesium Sulfate 20 

meq/Calcium Gluconate 15 meq/ Multivitamins 10 ml/Chromium/ Copper/Manganese/ 

Seleni/Zn 0.5 ml/ Insulin Human Regular 35 unit/ Total Parenteral Nu

trition/Amino Acids/Dextrose/ Fat Emulsion Intravenous 1,400 ml @  58.333 mls/ 

hr TPN  CONT IV  Last administered on 4/22/20at 22:27;  Start 4/22/20 at 22:00; 

Stop 4/23/20 at 21:59;  Status DC


Daptomycin 430 mg/ Sodium Chloride 50 ml @  100 mls/hr Q24H IV  Last 

administered on 4/24/20at 15:07;  Start 4/22/20 at 13:00;  Stop 4/25/20 at 

13:15;  Status DC


Sodium Chloride 100 meq/Potassium Chloride 40 meq/ Magnesium Sulfate 20 

meq/Calcium Gluconate 10 meq/ Multivitamins 10 ml/Chromium/ Copper/Manganese/ 

Seleni/Zn 0.5 ml/ Insulin Human Regular 35 unit/ Total Parenteral 

Nutrition/Amino Acids/Dextrose/ Fat Emulsion Intravenous 1,400 ml @  58.333 mls/

hr TPN  CONT IV  Last administered on 4/24/20at 00:06;  Start 4/23/20 at 22:00; 

Stop 4/24/20 at 21:59;  Status DC


Alteplase, Recombinant (Cathflo For Central Catheter Clearance) 1 mg 1X  ONCE 

INT CAT  Last administered on 4/24/20at 11:44;  Start 4/24/20 at 10:45;  Stop 

4/24/20 at 10:46;  Status DC


Ondansetron HCl (Zofran) 4 mg PRN Q6HRS  PRN IV NAUSEA/VOMITING;  Start 4/27/20 

at 07:00;  Stop 4/28/20 at 06:59;  Status DC


Fentanyl Citrate (Fentanyl 2ml Vial) 25 mcg PRN Q5MIN  PRN IV MILD PAIN 1-3;  

Start 4/27/20 at 07:00;  Stop 4/28/20 at 06:59;  Status DC


Fentanyl Citrate (Fentanyl 2ml Vial) 50 mcg PRN Q5MIN  PRN IV MODERATE TO SEVERE

PAIN Last administered on 4/27/20at 10:17;  Start 4/27/20 at 07:00;  Stop 

4/28/20 at 06:59;  Status DC


Ringer's Solution 1,000 ml @  30 mls/hr Q24H IV ;  Start 4/27/20 at 07:00;  Stop

4/27/20 at 18:59;  Status DC


Lidocaine HCl (Xylocaine-Mpf 1% 2ml Vial) 2 ml PRN 1X  PRN ID PRIOR TO IV START;

 Start 4/27/20 at 07:00;  Stop 4/28/20 at 06:59;  Status DC


Prochlorperazine Edisylate (Compazine) 5 mg PACU PRN  PRN IV NAUSEA, MRX1;  

Start 4/27/20 at 07:00;  Stop 4/28/20 at 06:59;  Status DC


Sodium Acetate 50 meq/Potassium Acetate 55 meq/ Magnesium Sulfate 20 meq/Calcium

Gluconate 10 meq/ Multivitamins 10 ml/Chromium/ Copper/Manganese/ Seleni/Zn 0.5 

ml/ Insulin Human Regular 35 unit/ Total Parenteral Nutrition/Amino 

Acids/Dextrose/ Fat Emulsion Intravenous 1,400 ml @  58.333 mls/ hr TPN  CONT IV

;  Start 4/24/20 at 22:00;  Stop 4/24/20 at 14:15;  Status DC


Sodium Acetate 50 meq/Potassium Acetate 55 meq/ Magnesium Sulfate 20 meq/Calcium

Gluconate 10 meq/ Multivitamins 10 ml/Chromium/ Copper/Manganese/ Seleni/Zn 0.5 

ml/ Insulin Human Regular 35 unit/ Total Parenteral Nutrition/Amino 

Acids/Dextrose/ Fat Emulsion Intravenous 1,800 ml @  75 mls/hr TPN  CONT IV  

Last administered on 4/24/20at 22:38;  Start 4/24/20 at 22:00;  Stop 4/25/20 at 

21:59;  Status DC


Sodium Chloride 1,000 ml @  1,000 mls/hr Q1H PRN IV hypotension;  Start 4/24/20 

at 15:31;  Stop 4/24/20 at 21:30;  Status DC


Diphenhydramine HCl (Benadryl) 25 mg 1X PRN  PRN IV ITCHING;  Start 4/24/20 at 

15:45;  Stop 4/25/20 at 15:44;  Status DC


Diphenhydramine HCl (Benadryl) 25 mg 1X PRN  PRN IV ITCHING;  Start 4/24/20 at 

15:45;  Stop 4/25/20 at 15:44;  Status DC


Sodium Chloride 1,000 ml @  400 mls/hr Q2H30M PRN IV PATENCY;  Start 4/24/20 at 

15:31;  Stop 4/25/20 at 03:30;  Status DC


Info (PHARMACY MONITORING -- do not chart) 1 each PRN DAILY  PRN MC SEE 

COMMENTS;  Start 4/24/20 at 15:45;  Stop 5/26/20 at 14:14;  Status DC


Sodium Acetate 50 meq/Potassium Acetate 55 meq/ Magnesium Sulfate 20 meq/Calcium

Gluconate 10 meq/ Multivitamins 10 ml/Chromium/ Copper/Manganese/ Seleni/Zn 0.5 

ml/ Insulin Human Regular 35 unit/ Total Parenteral Nutrition/Amino 

Acids/Dextrose/ Fat Emulsion Intravenous 1,800 ml @  75 mls/hr TPN  CONT IV  

Last administered on 4/25/20at 22:03;  Start 4/25/20 at 22:00;  Stop 4/26/20 at 

21:59;  Status DC


Daptomycin 430 mg/ Sodium Chloride 50 ml @  100 mls/hr Q24H IV  Last 

administered on 4/30/20at 13:00;  Start 4/25/20 at 13:00;  Stop 4/30/20 at 

20:58;  Status DC


Heparin Sodium (Porcine) 1000 unit/Sodium Chloride 1,001 ml @  1,001 mls/hr 1X  

ONCE IRR ;  Start 4/27/20 at 06:00;  Stop 4/27/20 at 06:59;  Status DC


Potassium Acetate 55 meq/Magnesium Sulfate 20 meq/ Calcium Gluconate 10 meq/ 

Multivitamins 10 ml/Chromium/ Copper/Manganese/ Seleni/Zn 0.5 ml/ Insulin Human 

Regular 35 unit/ Total Parenteral Nutrition/Amino Acids/Dextrose/ Fat Emulsion 

Intravenous 1,920 ml @  80 mls/hr TPN  CONT IV  Last administered on 4/26/20at 

22:10;  Start 4/26/20 at 22:00;  Stop 4/27/20 at 21:59;  Status DC


Dexamethasone Sodium Phosphate (Decadron) 4 mg STK-MED ONCE .ROUTE ;  Start 

4/27/20 at 10:56;  Stop 4/27/20 at 10:57;  Status DC


Ondansetron HCl (Zofran) 4 mg STK-MED ONCE .ROUTE ;  Start 4/27/20 at 10:56;  

Stop 4/27/20 at 10:57;  Status DC


Rocuronium Bromide (Zemuron) 50 mg STK-MED ONCE .ROUTE ;  Start 4/27/20 at 1

0:56;  Stop 4/27/20 at 10:57;  Status DC


Fentanyl Citrate (Fentanyl 2ml Vial) 100 mcg STK-MED ONCE .ROUTE ;  Start 

4/27/20 at 10:56;  Stop 4/27/20 at 10:57;  Status DC


Bupivacaine HCl/ Epinephrine Bitart (Sensorcain-Epi 0.5%-1:[id number] Mpf) 30 ml 

STK-MED ONCE .ROUTE  Last administered on 4/27/20at 12:01;  Start 4/27/20 at 

10:58;  Stop 4/27/20 at 10:58;  Status DC


Cellulose (Surgicel Hemostat 2x14) 1 each STK-MED ONCE .ROUTE ;  Start 4/27/20 

at 10:58;  Stop 4/27/20 at 10:59;  Status DC


Iohexol (Omnipaque 300 Mg/ml) 50 ml STK-MED ONCE .ROUTE ;  Start 4/27/20 at 

10:58;  Stop 4/27/20 at 10:59;  Status DC


Cellulose (Surgicel Hemostat 4x8) 1 each STK-MED ONCE .ROUTE ;  Start 4/27/20 at

10:58;  Stop 4/27/20 at 10:59;  Status DC


Bisacodyl (Dulcolax Supp) 10 mg STK-MED ONCE .ROUTE ;  Start 4/27/20 at 10:59;  

Stop 4/27/20 at 10:59;  Status DC


Heparin Sodium (Porcine) 1000 unit/Sodium Chloride 1,001 ml @  1,001 mls/hr 1X  

ONCE IRR ;  Start 4/27/20 at 12:00;  Stop 4/27/20 at 12:59;  Status DC


Propofol 20 ml @ As Directed STK-MED ONCE IV ;  Start 4/27/20 at 11:05;  Stop 

4/27/20 at 11:05;  Status DC


Sevoflurane (Ultane) 90 ml STK-MED ONCE IH ;  Start 4/27/20 at 11:05;  Stop 

4/27/20 at 11:05;  Status DC


Sevoflurane (Ultane) 60 ml STK-MED ONCE IH ;  Start 4/27/20 at 12:26;  Stop 

4/27/20 at 12:27;  Status DC


Propofol 20 ml @ As Directed STK-MED ONCE IV ;  Start 4/27/20 at 12:26;  Stop 

4/27/20 at 12:27;  Status DC


Phenylephrine HCl (PHENYLEPHRINE in 0.9% NACL PF) 1 mg STK-MED ONCE IV ;  Start 

4/27/20 at 12:34;  Stop 4/27/20 at 12:34;  Status DC


Heparin Sodium (Porcine) (Heparin Sodium) 5,000 unit Q12HR SQ  Last administered

on 5/6/20at 20:57;  Start 4/27/20 at 21:00;  Stop 5/7/20 at 09:59;  Status DC


Sodium Chloride (Normal Saline Flush) 3 ml QSHIFT  PRN IV AFTER MEDS AND BLOOD 

DRAWS;  Start 4/27/20 at 13:45


Naloxone HCl (Narcan) 0.4 mg PRN Q2MIN  PRN IV SEE INSTRUCTIONS Last 

administered on 6/6/20at 15:15;  Start 4/27/20 at 13:45


Sodium Chloride 1,000 ml @  25 mls/hr Q24H IV  Last administered on 5/26/20at 

13:37;  Start 4/27/20 at 13:37;  Stop 5/29/20 at 13:09;  Status DC


Naloxone HCl (Narcan) 0.4 mg PRN Q2MIN  PRN IV SEE INSTRUCTIONS;  Start 4/27/20 

at 14:30;  Status UNV


Sodium Chloride 1,000 ml @  25 mls/hr Q24H IV ;  Start 4/27/20 at 14:30;  Status

UNV


Hydromorphone HCl 30 ml @ 0 mls/hr CONT PRN  PRN IV PER PROTOCOL Last 

administered on 5/2/20at 16:08;  Start 4/27/20 at 14:30;  Stop 5/4/20 at 08:55; 

Status DC


Potassium Acetate 55 meq/Magnesium Sulfate 20 meq/ Calcium Gluconate 10 meq/ 

Multivitamins 10 ml/Chromium/ Copper/Manganese/ Seleni/Zn 0.5 ml/ Insulin Human 

Regular 35 unit/ Total Parenteral Nutrition/Amino Acids/Dextrose/ Fat Emulsion 

Intravenous 1,920 ml @  80 mls/hr TPN  CONT IV  Last administered on 4/27/20at 

22:01;  Start 4/27/20 at 22:00;  Stop 4/28/20 at 21:59;  Status DC


Bumetanide (Bumex) 2 mg [id number] IV  Last administered on 5/1/20at 13:50;  Start 

4/28/20 at 14:00;  Stop 5/2/20 at 14:10;  Status DC


Meropenem 1 gm/ Sodium Chloride 100 ml @  200 mls/hr Q8HRS IV  Last administered

on 5/22/20at 05:53;  Start 4/28/20 at 14:00;  Stop 5/22/20 at 09:31;  Status DC


Potassium Acetate 55 meq/Magnesium Sulfate 20 meq/ Calcium Gluconate 10 meq/ 

Multivitamins 10 ml/Chromium/ Copper/Manganese/ Seleni/Zn 0.5 ml/ Insulin Human 

Regular 35 unit/ Total Parenteral Nutrition/Amino Acids/Dextrose/ Fat Emulsion 

Intravenous 1,920 ml @  80 mls/hr TPN  CONT IV  Last administered on 4/28/20at 

22:02;  Start 4/28/20 at 22:00;  Stop 4/29/20 at 21:59;  Status DC


Hydromorphone HCl (Dilaudid Standard PCA) 12 mg STK-MED ONCE IV ;  Start 4/27/20

at 14:35;  Stop 4/28/20 at 13:53;  Status DC


Artificial Tears (Artificial Tears) 1 drop PRN Q15MIN  PRN OU DRY EYE Last 

administered on 6/15/20at 03:38;  Start 4/29/20 at 05:30


Hydromorphone HCl (Dilaudid Standard PCA) 12 mg STK-MED ONCE IV ;  Start 4/28/20

at 12:05;  Stop 4/29/20 at 09:15;  Status DC


Potassium Acetate 65 meq/Magnesium Sulfate 20 meq/ Calcium Gluconate 10 meq/ 

Multivitamins 10 ml/Chromium/ Copper/Manganese/ Seleni/Zn 0.5 ml/ Insulin Human 

Regular 30 unit/ Total Parenteral Nutrition/Amino Acids/Dextrose/ Fat Emulsion 

Intravenous 1,920 ml @  80 mls/hr TPN  CONT IV  Last administered on 4/29/20at 

22:22;  Start 4/29/20 at 22:00;  Stop 4/30/20 at 21:59;  Status DC


Cyclobenzaprine HCl (Flexeril) 10 mg PRN Q6HRS  PRN PO MUSCLE SPASMS;  Start 

4/30/20 at 10:45


Potassium Acetate 55 meq/Magnesium Sulfate 20 meq/ Calcium Gluconate 10 meq/ 

Multivitamins 10 ml/Chromium/ Copper/Manganese/ Seleni/Zn 0.5 ml/ Insulin Human 

Regular 30 unit/ Total Parenteral Nutrition/Amino Acids/Dextrose/ Fat Emulsion 

Intravenous 1,920 ml @  80 mls/hr TPN  CONT IV  Last administered on 5/1/20at 

01:00;  Start 4/30/20 at 22:00;  Stop 5/1/20 at 21:59;  Status DC


Magnesium Sulfate 50 ml @ 25 mls/hr 1X  ONCE IV  Last administered on 4/30/20at 

17:18;  Start 4/30/20 at 12:45;  Stop 4/30/20 at 14:44;  Status DC


Potassium Chloride/Water 100 ml @  100 mls/hr 1X  ONCE IV  Last administered on 

5/1/20at 11:27;  Start 5/1/20 at 12:00;  Stop 5/1/20 at 12:59;  Status DC


Hydromorphone HCl (Dilaudid Standard PCA) 12 mg STK-MED ONCE IV ;  Start 4/29/20

at 10:50;  Stop 5/1/20 at 11:02;  Status DC


Hydromorphone HCl (Dilaudid Standard PCA) 12 mg STK-MED ONCE IV ;  Start 4/30/20

at 13:47;  Stop 5/1/20 at 11:03;  Status DC


Potassium Acetate 30 meq/Magnesium Sulfate 20 meq/ Calcium Gluconate 10 meq/ 

Multivitamins 10 ml/Chromium/ Copper/Manganese/ Seleni/Zn 0.5 ml/ Insulin Human 

Regular 30 unit/ Potassium Chloride 30 meq/ Total Parenteral Nutrition/Amino 

Acids/Dextrose/ Fat Emulsion Intravenous 1,920 ml @  80 mls/hr TPN  CONT IV  

Last administered on 5/1/20at 22:34;  Start 5/1/20 at 22:00;  Stop 5/2/20 at 

21:59;  Status DC


Potassium Chloride/Water 100 ml @  100 mls/hr Q1H IV  Last administered on 

5/2/20at 13:05;  Start 5/2/20 at 07:00;  Stop 5/2/20 at 10:59;  Status DC


Magnesium Sulfate 50 ml @ 25 mls/hr 1X  ONCE IV  Last administered on 5/2/20at 

10:34;  Start 5/2/20 at 10:30;  Stop 5/2/20 at 12:29;  Status DC


Potassium Chloride 75 meq/ Magnesium Sulfate 20 meq/Calcium Gluconate 10 meq/ 

Multivitamins 10 ml/Chromium/ Copper/Manganese/ Seleni/Zn 0.5 ml/ Insulin Human 

Regular 30 unit/ Total Parenteral Nutrition/Amino Acids/Dextrose/ Fat Emulsion 

Intravenous 1,920 ml @  80 mls/hr TPN  CONT IV  Last administered on 5/2/20at 

21:51;  Start 5/2/20 at 22:00;  Stop 5/3/20 at 22:00;  Status DC


Potassium Chloride 75 meq/ Magnesium Sulfate 20 meq/Calcium Gluconate 10 meq/ 

Multivitamins 10 ml/Chromium/ Copper/Manganese/ Seleni/Zn 0.5 ml/ Insulin Human 

Regular 25 unit/ Total Parenteral Nutrition/Amino Acids/Dextrose/ Fat Emulsion 

Intravenous 1,920 ml @  80 mls/hr TPN  CONT IV  Last administered on 5/3/20at 

22:04;  Start 5/3/20 at 22:00;  Stop 5/4/20 at 21:59;  Status DC


Hydromorphone HCl (Dilaudid) 0.4 mg PRN Q4HRS  PRN IVP PAIN Last administered on

5/4/20at 10:57;  Start 5/4/20 at 09:00;  Stop 5/4/20 at 18:59;  Status DC


Micafungin Sodium 100 mg/Dextrose 100 ml @  100 mls/hr Q24H IV  Last 

administered on 5/26/20at 12:17;  Start 5/4/20 at 11:00;  Stop 5/27/20 at 09:59;

 Status DC


Daptomycin 485 mg/ Sodium Chloride 50 ml @  100 mls/hr Q24H IV  Last 

administered on 5/11/20at 13:10;  Start 5/4/20 at 11:00;  Stop 5/12/20 at 07:44;

 Status DC


Potassium Chloride 75 meq/ Magnesium Sulfate 15 meq/Calcium Gluconate 8 meq/ 

Multivitamins 10 ml/Chromium/ Copper/Manganese/ Seleni/Zn 0.5 ml/ Insulin Human 

Regular 25 unit/ Total Parenteral Nutrition/Amino Acids/Dextrose/ Fat Emulsion 

Intravenous 1,920 ml @  80 mls/hr TPN  CONT IV  Last administered on 5/4/20at 

23:08;  Start 5/4/20 at 22:00;  Stop 5/5/20 at 21:59;  Status DC


Haloperidol Lactate (Haldol Inj) 3 mg 1X  ONCE IVP  Last administered on 

5/4/20at 14:37;  Start 5/4/20 at 14:30;  Stop 5/4/20 at 14:31;  Status DC


Hydromorphone HCl (Dilaudid) 1 mg PRN Q4HRS  PRN IVP PAIN Last administered on 

5/18/20at 06:25;  Start 5/4/20 at 19:00;  Stop 5/18/20 at 17:10;  Status DC


Potassium Chloride 75 meq/ Magnesium Sulfate 15 meq/Calcium Gluconate 8 meq/ 

Multivitamins 10 ml/Chromium/ Copper/Manganese/ Seleni/Zn 0.5 ml/ Insulin Human 

Regular 20 unit/ Total Parenteral Nutrition/Amino Acids/Dextrose/ Fat Emulsion 

Intravenous 1,920 ml @  80 mls/hr TPN  CONT IV  Last administered on 5/5/20at 

22:10;  Start 5/5/20 at 22:00;  Stop 5/6/20 at 21:59;  Status DC


Lidocaine HCl (Buffered Lidocaine 1%) 3 ml STK-MED ONCE .ROUTE ;  Start 5/6/20 

at 11:31;  Stop 5/6/20 at 11:31;  Status DC


Lidocaine HCl (Buffered Lidocaine 1%) 3 ml STK-MED ONCE .ROUTE ;  Start 5/6/20 

at 12:28;  Stop 5/6/20 at 12:29;  Status DC


Lidocaine HCl (Buffered Lidocaine 1%) 6 ml 1X  ONCE INJ  Last administered on 

5/6/20at 12:53;  Start 5/6/20 at 12:45;  Stop 5/6/20 at 12:46;  Status DC


Potassium Chloride 75 meq/ Magnesium Sulfate 15 meq/Calcium Gluconate 8 meq/ 

Multivitamins 10 ml/Chromium/ Copper/Manganese/ Seleni/Zn 0.5 ml/ Insulin Human 

Regular 20 unit/ Total Parenteral Nutrition/Amino Acids/Dextrose/ Fat Emulsion 

Intravenous 1,920 ml @  80 mls/hr TPN  CONT IV  Last administered on 5/6/20at 

22:00;  Start 5/6/20 at 22:00;  Stop 5/7/20 at 21:59;  Status DC


Potassium Chloride 75 meq/ Magnesium Sulfate 15 meq/Calcium Gluconate 8 meq/ 

Multivitamins 10 ml/Chromium/ Copper/Manganese/ Seleni/Zn 0.5 ml/ Insulin Human 

Regular 15 unit/ Total Parenteral Nutrition/Amino Acids/Dextrose/ Fat Emulsion 

Intravenous 1,920 ml @  80 mls/hr TPN  CONT IV  Last administered on 5/7/20at 

22:28;  Start 5/7/20 at 22:00;  Stop 5/8/20 at 21:59;  Status DC


Vecuronium Bromide (Norcuron Bolus) 6 mg PRN Q6HRS  PRN IV VENT ASYNCHRONY;  

Start 5/7/20 at 19:15;  Stop 5/7/20 at 19:35;  Status DC


Bumetanide (Bumex) 2 mg 1X  ONCE IV  Last administered on 5/7/20at 22:09;  Start

5/7/20 at 19:45;  Stop 5/7/20 at 19:46;  Status DC


Lidocaine HCl (Buffered Lidocaine 1%) 3 ml STK-MED ONCE .ROUTE ;  Start 5/8/20 

at 07:59;  Stop 5/8/20 at 07:59;  Status DC


Midazolam HCl (Versed) 5 mg STK-MED ONCE .ROUTE ;  Start 5/8/20 at 08:36;  Stop 

5/8/20 at 08:36;  Status DC


Fentanyl Citrate (Fentanyl 5ml Vial) 250 mcg STK-MED ONCE .ROUTE ;  Start 5/8/20

at 08:36;  Stop 5/8/20 at 08:37;  Status DC


Lidocaine HCl (Buffered Lidocaine 1%) 3 ml 1X  ONCE IJ  Last administered on 

5/8/20at 09:30;  Start 5/8/20 at 09:15;  Stop 5/8/20 at 09:16;  Status DC


Midazolam HCl (Versed) 5 mg 1X  ONCE IV  Last administered on 5/8/20at 09:30;  

Start 5/8/20 at 09:15;  Stop 5/8/20 at 09:16;  Status DC


Fentanyl Citrate (Fentanyl 5ml Vial) 250 mcg 1X  ONCE IV  Last administered on 

5/8/20at 09:30;  Start 5/8/20 at 09:15;  Stop 5/8/20 at 09:16;  Status DC


Bumetanide (Bumex) 2 mg DAILY IV  Last administered on 5/18/20at 08:07;  Start 

5/8/20 at 10:00;  Stop 5/18/20 at 17:15;  Status DC


Potassium Chloride 75 meq/ Magnesium Sulfate 15 meq/ Multivitamins 10 

ml/Chromium/ Copper/Manganese/ Seleni/Zn 0.5 ml/ Insulin Human Regular 15 unit/ 

Total Parenteral Nutrition/Amino Acids/Dextrose/ Fat Emulsion Intravenous 1,920 

ml @  80 mls/hr TPN  CONT IV  Last administered on 5/8/20at 21:59;  Start 5/8/20

at 22:00;  Stop 5/9/20 at 21:59;  Status DC


Metoclopramide HCl (Reglan Vial) 10 mg PRN Q3HRS  PRN IVP NAUSEA/VOMITING-3rd 

choice Last administered on 5/14/20at 04:25;  Start 5/9/20 at 16:45


Potassium Chloride 75 meq/ Magnesium Sulfate 15 meq/ Multivitamins 10 

ml/Chromium/ Copper/Manganese/ Seleni/Zn 0.5 ml/ Insulin Human Regular 15 unit/ 

Total Parenteral Nutrition/Amino Acids/Dextrose/ Fat Emulsion Intravenous 1,920 

ml @  80 mls/hr TPN  CONT IV  Last administered on 5/9/20at 22:41;  Start 5/9/20

at 22:00;  Stop 5/10/20 at 21:59;  Status DC


Magnesium Sulfate 50 ml @ 25 mls/hr 1X  ONCE IV  Last administered on 5/10/20at 

10:44;  Start 5/10/20 at 09:00;  Stop 5/10/20 at 10:59;  Status DC


Potassium Chloride/Water 100 ml @  100 mls/hr 1X  ONCE IV  Last administered on 

5/10/20at 09:37;  Start 5/10/20 at 09:00;  Stop 5/10/20 at 09:59;  Status DC


Duloxetine HCl (Cymbalta) 30 mg DAILY PO  Last administered on 5/11/20at 09:48; 

Start 5/10/20 at 14:00;  Stop 5/13/20 at 10:25;  Status DC


Potassium Chloride 80 meq/ Magnesium Sulfate 20 meq/ Multivitamins 10 

ml/Chromium/ Copper/Manganese/ Seleni/Zn 0.5 ml/ Insulin Human Regular 15 unit/ 

Total Parenteral Nutrition/Amino Acids/Dextrose/ Fat Emulsion Intravenous 1,920 

ml @  80 mls/hr TPN  CONT IV  Last administered on 5/10/20at 21:42;  Start 

5/10/20 at 22:00;  Stop 5/11/20 at 21:59;  Status DC


Potassium Chloride 80 meq/ Magnesium Sulfate 20 meq/ Multivitamins 10 

ml/Chromium/ Copper/Manganese/ Seleni/Zn 0.5 ml/ Insulin Human Regular 15 unit/ 

Total Parenteral Nutrition/Amino Acids/Dextrose/ Fat Emulsion Intravenous 1,920 

ml @  80 mls/hr TPN  CONT IV  Last administered on 5/11/20at 22:20;  Start 

5/11/20 at 22:00;  Stop 5/12/20 at 21:59;  Status DC


Lidocaine HCl (Buffered Lidocaine 1%) 3 ml STK-MED ONCE .ROUTE ;  Start 5/12/20 

at 09:54;  Stop 5/12/20 at 09:55;  Status DC


Hydromorphone HCl (Dilaudid Standard PCA) 12 mg STK-MED ONCE IV ;  Start 5/1/20 

at 15:50;  Stop 5/12/20 at 11:24;  Status DC


Potassium Chloride 80 meq/ Magnesium Sulfate 20 meq/ Multivitamins 10 

ml/Chromium/ Copper/Manganese/ Seleni/Zn 0.5 ml/ Insulin Human Regular 15 unit/ 

Total Parenteral Nutrition/Amino Acids/Dextrose/ Fat Emulsion Intravenous 1,920 

ml @  80 mls/hr TPN  CONT IV  Last administered on 5/12/20at 21:40;  Start 

5/12/20 at 22:00;  Stop 5/13/20 at 21:59;  Status DC


Lidocaine HCl (Buffered Lidocaine 1%) 6 ml 1X  ONCE INJ  Last administered on 

5/12/20at 14:15;  Start 5/12/20 at 14:15;  Stop 5/12/20 at 14:16;  Status DC


Potassium Chloride 80 meq/ Magnesium Sulfate 20 meq/ Multivitamins 10 

ml/Chromium/ Copper/Manganese/ Seleni/Zn 1 ml/ Insulin Human Regular 15 unit/ 

Total Parenteral Nutrition/Amino Acids/Dextrose/ Fat Emulsion Intravenous 1,920 

ml @  80 mls/hr TPN  CONT IV  Last administered on 5/13/20at 22:04;  Start 

5/13/20 at 22:00;  Stop 5/14/20 at 21:59;  Status DC


Potassium Chloride/Water 100 ml @  100 mls/hr 1X  ONCE IV  Last administered on 

5/14/20at 11:34;  Start 5/14/20 at 11:00;  Stop 5/14/20 at 11:59;  Status DC


Potassium Chloride 90 meq/ Magnesium Sulfate 20 meq/ Multivitamins 10 

ml/Chromium/ Copper/Manganese/ Seleni/Zn 1 ml/ Insulin Human Regular 15 unit/ 

Total Parenteral Nutrition/Amino Acids/Dextrose/ Fat Emulsion Intravenous 1,920 

ml @  80 mls/hr TPN  CONT IV  Last administered on 5/14/20at 22:57;  Start 

5/14/20 at 22:00;  Stop 5/15/20 at 21:59;  Status DC


Potassium Chloride 90 meq/ Magnesium Sulfate 20 meq/ Multivitamins 10 

ml/Chromium/ Copper/Manganese/ Seleni/Zn 1 ml/ Insulin Human Regular 15 unit/ 

Total Parenteral Nutrition/Amino Acids/Dextrose/ Fat Emulsion Intravenous 1,920 

ml @  80 mls/hr TPN  CONT IV  Last administered on 5/15/20at 22:48;  Start 

5/15/20 at 22:00;  Stop 5/16/20 at 21:59;  Status DC


Potassium Chloride 90 meq/ Magnesium Sulfate 20 meq/ Multivitamins 10 

ml/Chromium/ Copper/Manganese/ Seleni/Zn 1 ml/ Insulin Human Regular 15 unit/ 

Total Parenteral Nutrition/Amino Acids/Dextrose/ Fat Emulsion Intravenous 1,890 

ml @  78.75 mls/ hr TPN  CONT IV  Last administered on 5/16/20at 22:15;  Start 

5/16/20 at 22:00;  Stop 5/17/20 at 21:59;  Status DC


Linezolid/Dextrose 300 ml @  300 mls/hr Q12HR IV  Last administered on 5/19/20at

21:08;  Start 5/17/20 at 09:00;  Stop 5/20/20 at 08:11;  Status DC


Daptomycin 450 mg/ Sodium Chloride 50 ml @  100 mls/hr Q24H IV  Last 

administered on 5/20/20at 09:25;  Start 5/17/20 at 09:00;  Stop 5/21/20 at 

08:30;  Status DC


Potassium Chloride 90 meq/ Magnesium Sulfate 20 meq/ Multivitamins 10 

ml/Chromium/ Copper/Manganese/ Seleni/Zn 1 ml/ Insulin Human Regular 15 unit/ 

Total Parenteral Nutrition/Amino Acids/Dextrose/ Fat Emulsion Intravenous 1,890 

ml @  78.75 mls/ hr TPN  CONT IV  Last administered on 5/17/20at 21:34;  Start 

5/17/20 at 22:00;  Stop 5/18/20 at 21:59;  Status DC


Lorazepam (Ativan Inj) 2 mg STK-MED ONCE .ROUTE ;  Start 5/17/20 at 14:58;  Stop

5/17/20 at 14:58;  Status DC


Metoprolol Tartrate (Lopressor Vial) 5 mg 1X  ONCE IVP  Last administered on 

5/17/20at 15:31;  Start 5/17/20 at 15:15;  Stop 5/17/20 at 15:16;  Status DC


Lorazepam (Ativan Inj) 2 mg 1X  ONCE IVP  Last administered on 5/17/20at 15:30; 

Start 5/17/20 at 15:15;  Stop 5/17/20 at 15:16;  Status DC


Enoxaparin Sodium (Lovenox 40mg Syringe) 40 mg Q24H SQ  Last administered on 

6/5/20at 17:44;  Start 5/17/20 at 17:00;  Stop 6/7/20 at 06:50;  Status DC


Lorazepam (Ativan Inj) 1 mg PRN Q4HRS  PRN IVP ANXIETY / AGITATION MILD-MOD Last

administered on 5/31/20at 15:55;  Start 5/17/20 at 19:15;  Stop 6/2/20 at 11:45;

 Status DC


Lorazepam (Ativan Inj) 2 mg PRN Q4HRS  PRN IVP ANXIETY / AGITATION SEVERE Last 

administered on 6/1/20at 07:55;  Start 5/17/20 at 19:15;  Stop 6/2/20 at 11:45; 

Status DC


Fentanyl Citrate (Fentanyl 2ml Vial) 50 mcg PRN Q4HRS  PRN IVP SEVERE PAIN Last 

administered on 6/13/20at 05:15;  Start 5/18/20 at 13:15;  Stop 6/14/20 at 

09:29;  Status DC


Fentanyl Citrate (Fentanyl 2ml Vial) 25 mcg PRN Q4HRS  PRN IVP MODERATE PAIN 

Last administered on 6/13/20at 00:27;  Start 5/18/20 at 13:15;  Stop 6/14/20 at 

09:30;  Status DC


Potassium Chloride 90 meq/ Magnesium Sulfate 20 meq/ Multivitamins 10 

ml/Chromium/ Copper/Manganese/ Seleni/Zn 1 ml/ Insulin Human Regular 15 unit/ 

Total Parenteral Nutrition/Amino Acids/Dextrose/ Fat Emulsion Intravenous 1,890 

ml @  78.75 mls/ hr TPN  CONT IV  Last administered on 5/18/20at 22:18;  Start 

5/18/20 at 22:00;  Stop 5/19/20 at 21:59;  Status DC


Furosemide (Lasix) 40 mg 1X  ONCE IVP  Last administered on 5/18/20at 21:51;  

Start 5/18/20 at 21:45;  Stop 5/18/20 at 21:48;  Status DC


Albumin Human 100 ml @  100 mls/hr 1X PRN  PRN IV SEE COMMENTS;  Start 5/19/20 

at 01:30


Furosemide (Lasix) 40 mg BID92 IVP  Last administered on 6/3/20at 08:04;  Start 

5/19/20 at 14:00;  Stop 6/3/20 at 13:07;  Status DC


Potassium Chloride 90 meq/ Magnesium Sulfate 20 meq/ Multivitamins 10 

ml/Chromium/ Copper/Manganese/ Seleni/Zn 1 ml/ Insulin Human Regular 15 unit/ 

Total Parenteral Nutrition/Amino Acids/Dextrose/ Fat Emulsion Intravenous 1,800 

ml @  75 mls/hr TPN  CONT IV  Last administered on 5/19/20at 22:31;  Start 

5/19/20 at 22:00;  Stop 5/20/20 at 21:59;  Status DC


Potassium Chloride 90 meq/ Magnesium Sulfate 20 meq/ Multivitamins 10 

ml/Chromium/ Copper/Manganese/ Seleni/Zn 1 ml/ Insulin Human Regular 15 unit/ 

Total Parenteral Nutrition/Amino Acids/Dextrose/ Fat Emulsion Intravenous 1,800 

ml @  75 mls/hr TPN  CONT IV  Last administered on 5/20/20at 22:28;  Start 

5/20/20 at 22:00;  Stop 5/21/20 at 21:59;  Status DC


Potassium Chloride 110 meq/ Magnesium Sulfate 20 meq/ Multivitamins 10 

ml/Chromium/ Copper/Manganese/ Seleni/Zn 1 ml/ Insulin Human Regular 15 unit/ 

Total Parenteral Nutrition/Amino Acids/Dextrose/ Fat Emulsion Intravenous 1,800 

ml @  75 mls/hr TPN  CONT IV  Last administered on 5/21/20at 22:01;  Start 

5/21/20 at 22:00;  Stop 5/22/20 at 21:59;  Status DC


Saliva Substitute (Biotene Moisturizing Mouth) 2 spray PRN Q15MIN  PRN PO DRY 

MOUTH;  Start 5/21/20 at 11:00


Potassium Chloride 110 meq/ Magnesium Sulfate 20 meq/ Multivitamins 10 

ml/Chromium/ Copper/Manganese/ Seleni/Zn 1 ml/ Insulin Human Regular 15 unit/ 

Total Parenteral Nutrition/Amino Acids/Dextrose/ Fat Emulsion Intravenous 1,800 

ml @  75 mls/hr TPN  CONT IV  Last administered on 5/22/20at 22:21;  Start 

5/22/20 at 22:00;  Stop 5/23/20 at 21:59;  Status DC


Potassium Chloride 110 meq/ Magnesium Sulfate 20 meq/ Multivitamins 10 

ml/Chromium/ Copper/Manganese/ Seleni/Zn 1 ml/ Insulin Human Regular 15 unit/ 

Total Parenteral Nutrition/Amino Acids/Dextrose/ Fat Emulsion Intravenous 1,800 

ml @  75 mls/hr TPN  CONT IV  Last administered on 5/23/20at 22:04;  Start 

5/23/20 at 22:00;  Stop 5/24/20 at 21:59;  Status DC


Potassium Chloride 110 meq/ Magnesium Sulfate 20 meq/ Multivitamins 10 

ml/Chromium/ Copper/Manganese/ Seleni/Zn 1 ml/ Insulin Human Regular 15 unit/ 

Total Parenteral Nutrition/Amino Acids/Dextrose/ Fat Emulsion Intravenous 1,800 

ml @  75 mls/hr TPN  CONT IV  Last administered on 5/24/20at 22:48;  Start 

5/24/20 at 22:00;  Stop 5/25/20 at 21:59;  Status DC


Potassium Chloride 70 meq/ Magnesium Sulfate 20 meq/ Multivitamins 10 

ml/Chromium/ Copper/Manganese/ Seleni/Zn 1 ml/ Insulin Human Regular 15 unit/ 

Total Parenteral Nutrition/Amino Acids/Dextrose/ Fat Emulsion Intravenous 1,800 

ml @  75 mls/hr TPN  CONT IV  Last administered on 5/25/20at 21:39;  Start 

5/25/20 at 22:00;  Stop 5/26/20 at 21:59;  Status DC


Meropenem 500 mg/ Sodium Chloride 50 ml @  100 mls/hr Q6HRS IV  Last 

administered on 5/27/20at 06:02;  Start 5/25/20 at 18:00;  Stop 5/27/20 at 

09:59;  Status DC


Barium Sulfate (Varibar Thin Liquid Apple) 148 gm 1X  ONCE PO ;  Start 5/26/20 

at 11:45;  Stop 5/26/20 at 11:49;  Status DC


Potassium Chloride 70 meq/ Magnesium Sulfate 20 meq/ Multivitamins 10 

ml/Chromium/ Copper/Manganese/ Seleni/Zn 1 ml/ Insulin Human Regular 15 unit/ 

Total Parenteral Nutrition/Amino Acids/Dextrose/ Fat Emulsion Intravenous 1,800 

ml @  75 mls/hr TPN  CONT IV  Last administered on 5/26/20at 22:27;  Start 

5/26/20 at 22:00;  Stop 5/27/20 at 21:59;  Status DC


Piperacillin Sod/ Tazobactam Sod 3.375 gm/Sodium Chloride 50 ml @  100 mls/hr 

Q6HRS IV  Last administered on 6/4/20at 06:10;  Start 5/27/20 at 12:00;  Stop 

6/4/20 at 07:26;  Status DC


Potassium Chloride 70 meq/ Magnesium Sulfate 20 meq/ Multivitamins 10 

ml/Chromium/ Copper/Manganese/ Seleni/Zn 1 ml/ Insulin Human Regular 15 unit/ 

Total Parenteral Nutrition/Amino Acids/Dextrose/ Fat Emulsion Intravenous 1,800 

ml @  75 mls/hr TPN  CONT IV  Last administered on 5/27/20at 22:03;  Start 

5/27/20 at 22:00;  Stop 5/28/20 at 21:59;  Status DC


Potassium Chloride 70 meq/ Magnesium Sulfate 20 meq/ Multivitamins 10 

ml/Chromium/ Copper/Manganese/ Seleni/Zn 1 ml/ Insulin Human Regular 15 unit/ 

Total Parenteral Nutrition/Amino Acids/Dextrose/ Fat Emulsion Intravenous 1,800 

ml @  75 mls/hr TPN  CONT IV  Last administered on 5/28/20at 22:33;  Start 

5/28/20 at 22:00;  Stop 5/29/20 at 21:59;  Status DC


Potassium Chloride 70 meq/ Magnesium Sulfate 20 meq/ Multivitamins 10 

ml/Chromium/ Copper/Manganese/ Seleni/Zn 1 ml/ Insulin Human Regular 15 unit/ 

Total Parenteral Nutrition/Amino Acids/Dextrose/ Fat Emulsion Intravenous 1,800 

ml @  75 mls/hr TPN  CONT IV  Last administered on 5/29/20at 23:13;  Start 

5/29/20 at 22:00;  Stop 5/30/20 at 21:59;  Status DC


Potassium Chloride 80 meq/ Magnesium Sulfate 20 meq/ Multivitamins 10 

ml/Chromium/ Copper/Manganese/ Seleni/Zn 1 ml/ Insulin Human Regular 15 unit/ 

Total Parenteral Nutrition/Amino Acids/Dextrose/ Fat Emulsion Intravenous 1,800 

ml @  75 mls/hr TPN  CONT IV  Last administered on 5/30/20at 22:30;  Start 

5/30/20 at 22:00;  Stop 5/31/20 at 21:59;  Status DC


Potassium Chloride 80 meq/ Magnesium Sulfate 20 meq/ Multivitamins 10 

ml/Chromium/ Copper/Manganese/ Seleni/Zn 1 ml/ Insulin Human Regular 15 unit/ 

Total Parenteral Nutrition/Amino Acids/Dextrose/ Fat Emulsion Intravenous 1,800 

ml @  75 mls/hr TPN  CONT IV  Last administered on 5/31/20at 21:54;  Start 

5/31/20 at 22:00;  Stop 6/1/20 at 21:59;  Status DC


Potassium Chloride/Water 100 ml @  100 mls/hr 1X  ONCE IV  Last administered on 

6/1/20at 10:15;  Start 6/1/20 at 10:00;  Stop 6/1/20 at 10:59;  Status DC


Potassium Chloride 90 meq/ Magnesium Sulfate 20 meq/ Multivitamins 10 

ml/Chromium/ Copper/Manganese/ Seleni/Zn 1 ml/ Insulin Human Regular 20 unit/ 

Total Parenteral Nutrition/Amino Acids/Dextrose/ Fat Emulsion Intravenous 1,800 

ml @  75 mls/hr TPN  CONT IV  Last administered on 6/1/20at 22:28;  Start 6/1/20

at 22:00;  Stop 6/2/20 at 21:59;  Status DC


Potassium Chloride 90 meq/ Magnesium Sulfate 20 meq/ Multivitamins 10 ml/Chromi

um/ Copper/Manganese/ Seleni/Zn 1 ml/ Insulin Human Regular 20 unit/ Total 

Parenteral Nutrition/Amino Acids/Dextrose/ Fat Emulsion Intravenous 1,800 ml @  

75 mls/hr TPN  CONT IV  Last administered on 6/2/20at 22:08;  Start 6/2/20 at 

22:00;  Stop 6/3/20 at 21:59;  Status DC


Lorazepam (Ativan Inj) 0.25 mg PRN Q4HRS  PRN IVP ANXIETY / AGITATION Last 

administered on 6/13/20at 02:25;  Start 6/3/20 at 07:30


Potassium Chloride 90 meq/ Magnesium Sulfate 20 meq/ Multivitamins 10 

ml/Chromium/ Copper/Manganese/ Seleni/Zn 1 ml/ Insulin Human Regular 20 unit/ 

Total Parenteral Nutrition/Amino Acids/Dextrose/ Fat Emulsion Intravenous 1,800 

ml @  75 mls/hr TPN  CONT IV  Last administered on 6/3/20at 23:13;  Start 6/3/20

at 22:00;  Stop 6/4/20 at 21:59;  Status DC


Furosemide (Lasix) 40 mg DAILY IVP  Last administered on 6/5/20at 11:14;  Start 

6/3/20 at 13:30;  Stop 6/7/20 at 09:12;  Status DC


Fluoxetine HCl (PROzac) 20 mg QHS PEG  Last administered on 6/21/20at 22:19;  

Start 6/4/20 at 21:00


Fentanyl (Duragesic 50mcg/ Hr Patch) 1 patch Q72H TD  Last administered on 

6/4/20at 21:22;  Start 6/4/20 at 21:00;  Stop 6/13/20 at 12:00;  Status DC


Potassium Chloride 40 meq/ Potassium Acetate 60 meq/Magnesium Sulfate 10 meq/ 

Multivitamins 10 ml/Chromium/ Copper/Manganese/ Seleni/Zn 1 ml/ Insulin Human 

Regular 20 unit/ Total Parenteral Nutrition/Amino Acids/Dextrose/ Fat Emulsion 

Intravenous 1,800 ml @  75 mls/hr TPN  CONT IV  Last administered on 6/5/20at 

00:03;  Start 6/4/20 at 22:00;  Stop 6/5/20 at 21:59;  Status DC


Potassium Acetate 80 meq/Magnesium Sulfate 5 meq/ Multivitamins 10 ml/Chromium/ 

Copper/Manganese/ Seleni/Zn 1 ml/ Insulin Human Regular 20 unit/ Total 

Parenteral Nutrition/Amino Acids/Dextrose/ Fat Emulsion Intravenous 1,920 ml @  

80 mls/hr TPN  CONT IV  Last administered on 6/5/20at 21:59;  Start 6/5/20 at 

22:00;  Stop 6/6/20 at 21:59;  Status DC


Potassium Acetate 60 meq/Magnesium Sulfate 5 meq/ Multivitamins 10 ml/Chromium/ 

Copper/Manganese/ Seleni/Zn 1 ml/ Insulin Human Regular 30 unit/ Total 

Parenteral Nutrition/Amino Acids/Dextrose/ Fat Emulsion Intravenous 1,920 ml @  

80 mls/hr TPN  CONT IV  Last administered on 6/6/20at 21:54;  Start 6/6/20 at 

22:00;  Stop 6/7/20 at 21:59;  Status DC


Norepinephrine Bitartrate 8 mg/ Dextrose 258 ml @  13.332 mls/ hr CONT  PRN IV 

PER PROTOCOL Last administered on 6/7/20at 21:46;  Start 6/7/20 at 06:30


Albumin Human 500 ml @  125 mls/hr 1X  ONCE IV  Last administered on 6/7/20at 

08:10;  Start 6/7/20 at 08:15;  Stop 6/7/20 at 12:14;  Status DC


Potassium Acetate 40 meq/Magnesium Sulfate 5 meq/ Multivitamins 10 ml/Chromium/ 

Copper/Manganese/ Seleni/Zn 1 ml/ Insulin Human Regular 30 unit/ Total 

Parenteral Nutrition/Amino Acids/Dextrose/ Fat Emulsion Intravenous 1,920 ml @  

80 mls/hr TPN  CONT IV  Last administered on 6/7/20at 22:23;  Start 6/7/20 at 

22:00;  Stop 6/8/20 at 21:59;  Status DC


Meropenem 1 gm/ Sodium Chloride 100 ml @  200 mls/hr Q8HRS IV ;  Start 6/7/20 at

14:00;  Status Cancel


Meropenem 1 gm/ Sodium Chloride 100 ml @  200 mls/hr Q8HRS IV  Last administered

on 6/7/20at 11:04;  Start 6/7/20 at 10:00;  Stop 6/7/20 at 13:00;  Status DC


Meropenem 1 gm/ Sodium Chloride 100 ml @  200 mls/hr Q12HR IV  Last administered

on 6/22/20at 08:55;  Start 6/7/20 at 21:00


Sodium Chloride 1,000 ml @  1,000 mls/hr 1X  ONCE IV  Last administered on 

6/7/20at 11:06;  Start 6/7/20 at 10:45;  Stop 6/7/20 at 11:44;  Status DC


Micafungin Sodium 100 mg/Dextrose 100 ml @  100 mls/hr Q24H IV  Last 

administered on 6/21/20at 11:45;  Start 6/7/20 at 11:00


Daptomycin 410 mg/ Sodium Chloride 50 ml @  100 mls/hr Q24H IV  Last 

administered on 6/9/20at 13:33;  Start 6/7/20 at 14:00;  Stop 6/10/20 at 08:30; 

Status DC


Midazolam HCl (Versed) 2 mg STK-MED ONCE .ROUTE ;  Start 6/7/20 at 14:47;  Stop 

6/7/20 at 14:48;  Status DC


Fentanyl Citrate (Fentanyl 2ml Vial) 100 mcg STK-MED ONCE .ROUTE ;  Start 6/7/20

at 14:47;  Stop 6/7/20 at 14:48;  Status DC


Flumazenil (Romazicon) 0.5 mg STK-MED ONCE IV ;  Start 6/7/20 at 14:48;  Stop 

6/7/20 at 14:48;  Status DC


Naloxone HCl (Narcan) 0.4 mg STK-MED ONCE .ROUTE ;  Start 6/7/20 at 14:48;  Stop

6/7/20 at 14:48;  Status DC


Lidocaine HCl (Lidocaine 1% 20ml Vial) 20 ml STK-MED ONCE .ROUTE ;  Start 6/7/20

at 14:48;  Stop 6/7/20 at 14:48;  Status DC


Midazolam HCl (Versed) 2 mg 1X  ONCE IV  Last administered on 6/7/20at 15:28;  

Start 6/7/20 at 15:00;  Stop 6/7/20 at 15:01;  Status DC


Fentanyl Citrate (Fentanyl 2ml Vial) 100 mcg 1X  ONCE IV  Last administered on 

6/7/20at 15:28;  Start 6/7/20 at 15:00;  Stop 6/7/20 at 15:01;  Status DC


Lidocaine HCl (Lidocaine 1% 20ml Vial) 20 ml 1X  ONCE INJ  Last administered on 

6/7/20at 15:30;  Start 6/7/20 at 15:00;  Stop 6/7/20 at 15:01;  Status DC


Sodium Chloride 1,000 ml @  100 mls/hr Q10H IV  Last administered on 6/16/20at 

07:30;  Start 6/7/20 at 20:00;  Stop 6/16/20 at 11:26;  Status DC


Sodium Bicarbonate (Sodium Bicarb Adult 8.4% Syr) 50 meq 1X  ONCE IV  Last 

administered on 6/7/20at 21:47;  Start 6/7/20 at 22:00;  Stop 6/7/20 at 22:01;  

Status DC


Potassium Acetate 40 meq/Magnesium Sulfate 5 meq/ Multivitamins 10 ml/Chromium/ 

Copper/Manganese/ Seleni/Zn 1 ml/ Insulin Human Regular 30 unit/ Total 

Parenteral Nutrition/Amino Acids/Dextrose/ Fat Emulsion Intravenous 1,920 ml @  

80 mls/hr TPN  CONT IV  Last administered on 6/8/20at 22:28;  Start 6/8/20 at 

22:00;  Stop 6/9/20 at 21:59;  Status DC


Sodium Chloride 500 ml @  500 mls/hr 1X  ONCE IV  Last administered on 6/9/20at 

06:39;  Start 6/9/20 at 06:45;  Stop 6/9/20 at 07:44;  Status DC


Potassium Acetate 40 meq/Magnesium Sulfate 5 meq/ Multivitamins 10 ml/Chromium/ 

Copper/Manganese/ Seleni/Zn 1 ml/ Insulin Human Regular 30 unit/ Total 

Parenteral Nutrition/Amino Acids/Dextrose/ Fat Emulsion Intravenous 1,920 ml @  

80 mls/hr TPN  CONT IV  Last administered on 6/9/20at 22:03;  Start 6/9/20 at 

22:00;  Stop 6/10/20 at 21:59;  Status DC


Metoprolol Tartrate (Lopressor Vial) 5 mg PRN Q6HRS  PRN IVP HYPERTENSION Last 

administered on 6/18/20at 10:14;  Start 6/10/20 at 09:00


Potassium Acetate 40 meq/Magnesium Sulfate 5 meq/ Multivitamins 10 ml/Chromium/ 

Copper/Manganese/ Seleni/Zn 1 ml/ Insulin Human Regular 30 unit/ Total Pare

nteral Nutrition/Amino Acids/Dextrose/ Fat Emulsion Intravenous 1,920 ml @  80 

mls/hr TPN  CONT IV  Last administered on 6/10/20at 21:26;  Start 6/10/20 at 

22:00;  Stop 6/11/20 at 21:59;  Status DC


Potassium Acetate 40 meq/Magnesium Sulfate 5 meq/ Multivitamins 10 ml/Chromium/ 

Copper/Manganese/ Seleni/Zn 1 ml/ Insulin Human Regular 30 unit/ Total 

Parenteral Nutrition/Amino Acids/Dextrose/ Fat Emulsion Intravenous 1,920 ml @  

80 mls/hr TPN  CONT IV  Last administered on 6/11/20at 23:23;  Start 6/11/20 at 

22:00;  Stop 6/12/20 at 21:59;  Status DC


Potassium Acetate 40 meq/Magnesium Sulfate 5 meq/ Multivitamins 10 ml/Chromium/ 

Copper/Manganese/ Seleni/Zn 1 ml/ Insulin Human Regular 30 unit/ Total 

Parenteral Nutrition/Amino Acids/Dextrose/ Fat Emulsion Intravenous 1,920 ml @  

80 mls/hr TPN  CONT IV  Last administered on 6/12/20at 21:35;  Start 6/12/20 at 

22:00;  Stop 6/13/20 at 21:59;  Status DC


Furosemide (Lasix) 20 mg 1X  ONCE IVP  Last administered on 6/13/20at 06:26;  

Start 6/13/20 at 06:15;  Stop 6/13/20 at 06:16;  Status DC


Methylprednisolone Sodium Succinate (SOLU-Medrol 125MG VIAL) 125 mg 1X  ONCE IV 

Last administered on 6/13/20at 06:26;  Start 6/13/20 at 06:15;  Stop 6/13/20 at 

06:16;  Status DC


Albuterol/ Ipratropium (Duoneb) 3 ml Q4HRS NEB  Last administered on 6/22/20at 

08:47;  Start 6/13/20 at 08:00


Fentanyl Citrate 30 ml @ 0 mls/hr CONT  PRN IV SEE PROTOCOL Last administered on

6/22/20at 05:36;  Start 6/13/20 at 06:00


Propofol 100 ml @ 0 mls/hr CONT  PRN IV SEE PROTOCOL Last administered on 

6/20/20at 23:50;  Start 6/13/20 at 06:00


Fentanyl Citrate (Fentanyl 2ml Vial) 25 mcg PRN Q1HR  PRN IV SEE COMMENTS;  

Start 6/13/20 at 06:00


Fentanyl Citrate (Fentanyl 2ml Vial) 50 mcg PRN Q1HR  PRN IV SEE COMMENTS;  

Start 6/13/20 at 06:00


Chlorhexidine Gluconate (Peridex) 15 ml BID MM ;  Start 6/13/20 at 09:00;  Stop 

6/13/20 at 07:58;  Status DC


Potassium Acetate 40 meq/Magnesium Sulfate 5 meq/ Multivitamins 10 ml/Chromium/ 

Copper/Manganese/ Seleni/Zn 1 ml/ Insulin Human Regular 30 unit/ Total 

Parenteral Nutrition/Amino Acids/Dextrose/ Fat Emulsion Intravenous 1,920 ml @  

80 mls/hr TPN  CONT IV  Last administered on 6/13/20at 21:19;  Start 6/13/20 at 

22:00;  Stop 6/14/20 at 21:59;  Status DC


Acetylcysteine (Mucomyst 20% Resp Treatment) 600 mg BID NEB  Last administered 

on 6/19/20at 09:33;  Start 6/13/20 at 21:00;  Stop 6/19/20 at 10:39;  Status DC


Magnesium Sulfate 100 ml @  25 mls/hr 1X  ONCE IV  Last administered on 

6/13/20at 15:48;  Start 6/13/20 at 15:45;  Stop 6/13/20 at 19:44;  Status DC


Potassium Acetate 40 meq/Magnesium Sulfate 5 meq/ Multivitamins 10 ml/Chromium/ 

Copper/Manganese/ Seleni/Zn 1 ml/ Insulin Human Regular 30 unit/ Total Pa

renteral Nutrition/Amino Acids/Dextrose/ Fat Emulsion Intravenous 1,920 ml @  80

mls/hr TPN  CONT IV  Last administered on 6/14/20at 21:35;  Start 6/14/20 at 

22:00;  Stop 6/15/20 at 21:59;  Status DC


Potassium Chloride/Water 100 ml @  100 mls/hr Q1H IV  Last administered on 

6/15/20at 08:31;  Start 6/15/20 at 07:00;  Stop 6/15/20 at 08:59;  Status DC


Potassium Acetate 40 meq/Magnesium Sulfate 5 meq/ Multivitamins 10 ml/Chromium/ 

Copper/Manganese/ Seleni/Zn 1 ml/ Insulin Human Regular 30 unit/ Total Parentera

l Nutrition/Amino Acids/Dextrose/ Fat Emulsion Intravenous 1,920 ml @  80 mls/hr

TPN  CONT IV  Last administered on 6/15/20at 21:54;  Start 6/15/20 at 22:00;  

Stop 6/16/20 at 19:34;  Status DC


Lidocaine HCl (Buffered Lidocaine 1%) 3 ml STK-MED ONCE .ROUTE ;  Start 6/15/20 

at 12:14;  Stop 6/15/20 at 12:14;  Status DC


Lidocaine HCl (Buffered Lidocaine 1%) 3 ml 1X  ONCE IJ  Last administered on 

6/15/20at 13:11;  Start 6/15/20 at 13:00;  Stop 6/15/20 at 13:01;  Status DC


Magnesium Sulfate 50 ml @ 25 mls/hr 1X  ONCE IV ;  Start 6/16/20 at 08:15;  Stop

6/16/20 at 10:14;  Status DC


Potassium Acetate 40 meq/Magnesium Sulfate 10 meq/ Multivitamins 10 ml/Chromium/

Copper/Manganese/ Seleni/Zn 1 ml/ Insulin Human Regular 20 unit/ Total 

Parenteral Nutrition/Amino Acids/Dextrose/ Fat Emulsion Intravenous 1,920 ml @  

80 mls/hr TPN  CONT IV  Last administered on 6/16/20at 21:32;  Start 6/16/20 at 

22:00;  Stop 6/17/20 at 21:59;  Status DC


Potassium Chloride/Water 100 ml @  100 mls/hr Q1H IV  Last administered on 

6/17/20at 09:12;  Start 6/17/20 at 08:00;  Stop 6/17/20 at 09:59;  Status DC


Alteplase, Recombinant (Cathflo For Central Catheter Clearance) 4 mg 1X  ONCE 

INT CAT ;  Start 6/17/20 at 09:15;  Stop 6/17/20 at 09:16;  Status UNV


Alteplase, Recombinant (Cathflo For Central Catheter Clearance) 4 mg 1X  ONCE 

INT CAT ;  Start 6/17/20 at 09:15;  Stop 6/17/20 at 09:16;  Status UNV


Alteplase, Recombinant (Cathflo For Central Catheter Clearance) 4 mg 1X  ONCE 

INT CAT ;  Start 6/17/20 at 09:15;  Stop 6/17/20 at 09:16;  Status UNV


Alteplase, Recombinant 4 mg/ Sodium Chloride 20 ml @ 20 mls/hr 1X  ONCE IV  Last

administered on 6/17/20at 10:10;  Start 6/17/20 at 10:00;  Stop 6/17/20 at 

10:59;  Status DC


Alteplase, Recombinant 4 mg/ Sodium Chloride 20 ml @ 20 mls/hr 1X  ONCE IV  Last

administered on 6/17/20at 10:09;  Start 6/17/20 at 10:00;  Stop 6/17/20 at 

10:59;  Status DC


Alteplase, Recombinant 4 mg/ Sodium Chloride 20 ml @ 20 mls/hr 1X  ONCE IV  Last

administered on 6/17/20at 10:09;  Start 6/17/20 at 10:00;  Stop 6/17/20 at 

10:59;  Status DC


Potassium Acetate 60 meq/Magnesium Sulfate 10 meq/ Multivitamins 10 ml/Chromium/

Copper/Manganese/ Seleni/Zn 1 ml/ Insulin Human Regular 20 unit/ Total 

Parenteral Nutrition/Amino Acids/Dextrose/ Fat Emulsion Intravenous 1,920 ml @  

80 mls/hr TPN  CONT IV  Last administered on 6/17/20at 21:55;  Start 6/17/20 at 

22:00;  Stop 6/18/20 at 21:59;  Status DC


Albumin Human 500 ml @  125 mls/hr 1X  ONCE IV  Last administered on 6/18/20at 

12:01;  Start 6/18/20 at 11:15;  Stop 6/18/20 at 15:14;  Status DC


Sodium Chloride 500 ml @  500 mls/hr 1X  ONCE IV  Last administered on 6/18/20at

13:50;  Start 6/18/20 at 11:15;  Stop 6/18/20 at 12:14;  Status DC


Potassium Acetate 60 meq/Magnesium Sulfate 14 meq/ Multivitamins 10 ml/Chromium/

Copper/Manganese/ Seleni/Zn 1 ml/ Insulin Human Regular 20 unit/ Total 

Parenteral Nutrition/Amino Acids/Dextrose/ Fat Emulsion Intravenous 1,920 ml @  

80 mls/hr TPN  CONT IV  Last administered on 6/18/20at 22:26;  Start 6/18/20 at 

22:00;  Stop 6/19/20 at 21:59;  Status DC


Ciprofloxacin/ Dextrose 200 ml @  200 mls/hr Q12HR IV  Last administered on 

6/22/20at 08:56;  Start 6/18/20 at 21:00


Albumin Human 250 ml @  62.5 mls/hr 1X  ONCE IV  Last administered on 6/19/20at 

11:09;  Start 6/19/20 at 11:00;  Stop 6/19/20 at 14:59;  Status DC


Furosemide (Lasix) 20 mg 1X  ONCE IVP  Last administered on 6/19/20at 14:52;  

Start 6/19/20 at 10:45;  Stop 6/19/20 at 10:49;  Status DC


Potassium Acetate 60 meq/Magnesium Sulfate 14 meq/ Multivitamins 10 ml/Chromium/

Copper/Manganese/ Seleni/Zn 1 ml/ Insulin Human Regular 15 unit/ Total 

Parenteral Nutrition/Amino Acids/Dextrose/ Fat Emulsion Intravenous 1,920 ml @  

80 mls/hr TPN  CONT IV  Last administered on 6/19/20at 22:08;  Start 6/19/20 at 

22:00;  Stop 6/20/20 at 21:59;  Status DC


Potassium Acetate 60 meq/Magnesium Sulfate 14 meq/ Multivitamins 10 ml/Chromium/

Copper/Manganese/ Seleni/Zn 1 ml/ Insulin Human Regular 15 unit/ Total Pa

renteral Nutrition/Amino Acids/Dextrose/ Fat Emulsion Intravenous 1,920 ml @  80

mls/hr TPN  CONT IV  Last administered on 6/20/20at 22:12;  Start 6/20/20 at 

22:00;  Stop 6/21/20 at 21:59;  Status DC


Potassium Acetate 60 meq/Magnesium Sulfate 14 meq/ Multivitamins 10 ml/Chromium/

Copper/Manganese/ Seleni/Zn 1 ml/ Insulin Human Regular 15 unit/ Total 

Parenteral Nutrition/Amino Acids/Dextrose/ Fat Emulsion Intravenous 1,920 ml @  

80 mls/hr TPN  CONT IV  Last administered on 6/21/20at 22:22;  Start 6/21/20 at 

22:00;  Stop 6/22/20 at 21:59





Active Scripts


Active


Reported


Bisoprolol Fumarate 5 Mg Tablet 10 Mg PO DAILY


Vitals/I & O





Vital Sign - Last 24 Hours








 6/21/20 6/21/20 6/21/20 6/21/20





 10:00 11:31 12:00 12:00


 


Temp    98.2





    98.2


 


Pulse 121   116


 


Resp 21   19


 


B/P (MAP) 102/51 (68)   91/47 (62)


 


Pulse Ox 98 96  98


 


O2 Delivery Ventilator Tracheal Collar Trach Collar Ventilator


 


O2 Flow Rate  5.0  


 


    





    





 6/21/20 6/21/20 6/21/20 6/21/20





 12:03 12:33 14:00 16:00


 


Temp    97.9





    97.9


 


Pulse   118 100


 


Resp   16 20


 


B/P (MAP)   114/72 (86) 118/75 (89)


 


Pulse Ox 96 98 98 100


 


O2 Delivery   Ventilator Ventilator


 


O2 Flow Rate 5.0 5.0  


 


    





    





 6/21/20 6/21/20 6/21/20 6/21/20





 16:00 16:51 17:00 18:00


 


Pulse   118 112


 


Resp   14 14


 


B/P (MAP)   100/61 (74) 101/63 (76)


 


Pulse Ox  96 98 99


 


O2 Delivery Trach Collar Tracheal Collar Ventilator Ventilator


 


O2 Flow Rate  5.0  





 6/21/20 6/21/20 6/21/20 6/21/20





 20:00 20:00 20:34 21:00


 


Temp 98.6   





 98.6   


 


Pulse 108   110


 


Resp 13   16


 


B/P (MAP) 82/42 (55)   86/44 (58)


 


Pulse Ox 99  99 99


 


O2 Delivery Tracheal Collar Trach Collar Tracheal Collar Tracheal Collar


 


O2 Flow Rate   8.0 


 


    





    





 6/21/20 6/21/20 6/21/20 6/22/20





 22:00 23:00 23:45 00:00


 


Pulse 108 106  


 


Resp 15 15  


 


B/P (MAP) 97/55 (69) 91/50 (64)  


 


Pulse Ox 99 99 98 


 


O2 Delivery Tracheal Collar Tracheal Collar Tracheal Collar Trach Collar


 


O2 Flow Rate   8.0 





 6/22/20 6/22/20 6/22/20 6/22/20





 00:00 01:00 02:00 03:00


 


Temp 98.5   





 98.5   


 


Pulse 110 118 114 114


 


Resp 20 26 22 31


 


B/P (MAP) 83/58 (66) 111/79 (90) 100/61 (74) 115/77 (90)


 


Pulse Ox 98 96 97 97


 


O2 Delivery Tracheal Collar Tracheal Collar Tracheal Collar Tracheal Collar


 


    





    





 6/22/20 6/22/20 6/22/20 6/22/20





 03:23 04:00 04:00 05:00


 


Temp   98.2 





   98.2 


 


Pulse   116 118


 


Resp   24 25


 


B/P (MAP)   109/61 (77) 116/66 (83)


 


Pulse Ox 98  95 95


 


O2 Delivery Tracheal Collar Trach Collar Tracheal Collar Tracheal Collar


 


O2 Flow Rate 8.0   


 


    





    





 6/22/20 6/22/20 6/22/20 6/22/20





 06:00 07:00 08:00 08:00


 


Temp   98.7 





   98.7 


 


Pulse 116 119 120 


 


Resp 23 23 26 


 


B/P (MAP) 108/66 (80) 112/70 (84) 112/69 (83) 


 


Pulse Ox 97 96 96 


 


O2 Delivery Tracheal Collar Tracheal Collar Tracheal Collar Trach Collar


 


    





    





 6/22/20 6/22/20  





 08:48 09:00  


 


Pulse  117  


 


Resp  22  


 


B/P (MAP)  119/67 (84)  


 


Pulse Ox 96 97  


 


O2 Delivery Tracheal Collar Tracheal Collar  


 


O2 Flow Rate 8.0   














Intake and Output   


 


 6/21/20 6/21/20 6/22/20





 14:59 22:59 06:59


 


Intake Total 315 ml 1462 ml 806 ml


 


Output Total 675 ml 1336 ml 1025 ml


 


Balance -360 ml 126 ml -219 ml











Justicifation of Admission Dx:


Justifications for Admission:


Justification of Admission Dx:  Yes











CLEMENTINA PANTOJA MD           Jun 22, 2020 09:58

## 2020-06-22 NOTE — NUR
SS following for discharge planning. SS reviewed pt chart and discussed with pt RN. Pt 
currently on trach collar. Pt on TPN and IV Meropenem, Micafungin, and Ciprofloxacin. Pt has 
drains x3 and chest tube. Pt max assist with PT/OT and staff. Awaiting surgery to decide on 
surgical intervention. Pt remains Full Code. Pt has 50/50 guardianship with her daughters, 
Glenna and Beth. Glenna okay with DNR but Beth wanting to continue Full Code. Due to 
50/50 guardianship both daughters must be in agreement. SS will continue to follow for 
discharge planning.

## 2020-06-22 NOTE — PDOC
Objective:


Objective:


D/w nurse - noted some odd looking material from chest tube earlier, just got 

back in bed and got some Fentanyl so is resting.


Vital Signs:





                                   Vital Signs








  Date Time  Temp Pulse Resp B/P (MAP) Pulse Ox O2 Delivery O2 Flow Rate FiO2


 


6/22/20 12:00      Trach Collar  


 


6/22/20 12:00 98.6 111 25 113/74 (87) 93   





 98.6       


 


6/22/20 08:48       8.0 








Labs:





Laboratory Tests








Test


 6/21/20


18:25 6/22/20


01:12 6/22/20


11:17


 


Glucose (Fingerstick)


 140 mg/dL


(70-99) 133 mg/dL


(70-99) 183 mg/dL


(70-99)








Imaging:


CXR 6/22


Impression: 


1. Chest tube is seen in the left pleural base with no significant left-sided 

effusion. There does appear to be an increasing right-sided effusion with 

compressive atelectasis.





PE:





GEN: NAD/chronically ill


LUNGS: trach collar


HEART: mildly tachycardic on monitor


NEURO/PSYCH: sleeping, not awakened





A/P:


Complicated pancreatitis





--


Continue support.





Justicifation of Admission Dx:


Justifications for Admission:


Justification of Admission Dx:  Yes











RAGHAVENDRA MURCIA         Jun 22, 2020 12:55

## 2020-06-22 NOTE — PDOC
Infectious Disease Note


Subjective


Subjective


Patient is awake says she is feeling better





ROS


ROS


No nausea vomiting diarrhea chest pain shortness of breath





Vital Sign


Vital Signs





Vital Signs








  Date Time  Temp Pulse Resp B/P (MAP) Pulse Ox O2 Delivery O2 Flow Rate FiO2


 


6/22/20 10:00  121 23 120/87 (98) 98 Tracheal Collar  


 


6/22/20 08:48       8.0 


 


6/22/20 08:00 98.7       





 98.7       











Physical Exam


PHYSICAL EXAM


GENERAL: Propped up in bed, resting quietly 


HEENT: NGT in place. 


NECK:  Tracheostomy 


LUNGS: Diminished aeration bases, no accessory muscle use - CT on left with 

clear fluid


HEART:  S1, S2,regular 


ABDOMEN: Mild distention, bowel sounds present, soft, grimaces to palpation, 

drains x 3


: [place] ( 6/7)


EXTREMITIES: + edema BLE


SKIN: no signs of gen rash 


LUE-PICC (5/29) without signs of complications





Labs


Lab





Laboratory Tests








Test


 6/21/20


11:51 6/21/20


18:25 6/22/20


01:12


 


Glucose (Fingerstick)


 171 mg/dL


(70-99) 140 mg/dL


(70-99) 133 mg/dL


(70-99)








Micro








Objective


Assessment


Fever intermittent could be from underlying pancreatitis vs aspiration 


? Ileus with vomiting


Abd distention - U/S and CT reviewed s/p 0.4 L of opaque, debris-containing 

ascites was removed 5/6


Acute pancreatitis with persistent necrosis


  - 4/27 status post ROBERT drain placement + C paropsilosis; 5/6 + yeast & high 

amylase; s/p additional drains on 5/8


Anemia - S/p PRBCs


Cholelithiasis with thickening of the gallbladder wall.


Leucocytosis improved 


JED, hyperkalemia, Metabolic acidosis off dialysis


Acute hypoxic resp failure ,bilateral pleural effusion and atelectasis


hypocalcemia 


Prediabetes


HTN


s/p trach





Plan


Plan of Care


Continue cipro 6/18. sputum from 6/13 - growing PSA - may be colonization (I to 

Meropenem), clinically stable from resp standpoint


Continue meropenem, has MDRO PSAE June 7 from abd


Continue micafungin June 7


Follow-up cultures fluid for yeast ID


Monitor labs/temp


General surgery following


Monitor drain output


Maintain aspiration precaution


Supportive care


Contact islation for [phone]








Critically ill











LINN FRANZ MD               Jun 22, 2020 11:01

## 2020-06-22 NOTE — CARD
MR#: [id number]

Account#: [id number]

Accession#: [id number]

Date of Study: 06/22/2020

Ordering Physician: OVIDIO SARAVIA, 

Referring Physician: OVIDIO SARAVIA, 

Tech: Ansley Sal RDCS





--------------- APPROVED REPORT --------------





EXAM: Two-dimensional and M-mode echocardiogram with Doppler and color Doppler.



Other Information 

Quality : Good

Technically limited study due to  breast implants



INDICATION

Congestive Heart Failure 



2D DIMENSIONS 

RVDd3.7 (2.9-3.5cm)Left Atrium(2D)2.8 (1.6-4.0cm)

IVSd0.9 (0.7-1.1cm)Aortic Root(2D)2.9 (2.0-3.7cm)

LVDd3.8 (3.9-5.9cm)LVOT Diameter2.0 (1.8-2.4cm)

PWd1.0 (0.7-1.1cm)LVDs2.7 (2.5-4.0cm)

FS (%) 29.1 %SV34.9 ml

LVEF(%)56.6 (>50%)



Aortic Valve

AoV Peak Kalin.170.4cm/sAoV VTI21.3cm

AO Peak GR.11.6mmHgLVOT  VTI 20.47cm

AO Mean GR.6mmHgAVA   (VTI)3.00cm2



Mitral Valve

MV E Cnrystlc33.0cm/sMV DECEL [id number]

MV A Acfoppiv33.1cm/sE/A  Ratio0.8



TDI

Lateral E' P. V9.59cm/sMedial E' P. V8.56cm/s

E/Lateral E'7.8E/Medial E'8.8



Pulmonary Vein

S1 Romuggbh21.0cm/sS2 Qzhdfvmf58.87cm/s

D2 Aniwdasd41.9cm/s



 LEFT VENTRICLE 

The left ventricle is normal size. There is normal left ventricular wall thickness. The left ventricu
lar systolic function is normal. The Ejection Fraction is 60-65%. There is normal LV segmental wall m
otion. Transmitral Doppler flow pattern is Grade I-abnormal relaxation pattern.



 RIGHT VENTRICLE 

The right ventricle is normal size. The right ventricular systolic function is normal.



 ATRIA 

The left atrium size is normal. The right atrium size is normal. The interatrial septum is intact wit
h no evidence for an atrial septal defect or patent foramen ovale as noted on 2-D or Doppler imaging.




 AORTIC VALVE 

The aortic valve is not well visualized but appears to be functioning normally by Doppler interrogati
on. Doppler and Color Flow revealed no significant aortic regurgitation. There is no significant aort
ic valvular stenosis.



 MITRAL VALVE 

The mitral valve is normal in structure and function. There is no evidence of mitral valve prolapse. 
There is no mitral valve stenosis. Doppler and Color Flow revealed trace mitral valve regurgitation.



 TRICUSPID VALVE 

The tricuspid valve is normal in structure and function. Doppler and Color Flow revealed trace tricus
pid valve regurgitation. There is no tricuspid valve stenosis.



 PULMONIC VALVE 

The pulmonic valve is not well visualized. Doppler and Color Flow revealed no pulmonic valvular regur
gitation. There is no pulmonic valvular stenosis.



 GREAT VESSELS 

The aortic root is normal in size. The ascending aorta is normal in size. The IVC is normal in size a
nd collapses >50% with inspiration.



 PERICARDIAL EFFUSION 

There is no evidence of significant pericardial effusion.



Critical Notification

Critical Value: No



<Conclusion>

The left ventricular systolic function is normal.

The Ejection Fraction is 60-65%.

Transmitral Doppler flow pattern is Grade I-abnormal relaxation pattern.

There is no evidence of significant pericardial effusion.

Trace mitral valve regurgitation.

Trace tricuspid valve regurgitation.

There is no evidence of significant pericardial effusion.



Signed by : Lamonte Burr, 

Electronically Approved : 06/22/2020 13:10:18

## 2020-06-22 NOTE — PDOC
PROGRESS NOTES


Chief Complaint


Chief Complaint


impression =================











History of Present Illness


50yo F w/ PMHx HTN, prediabetes who presented the emergency room complaints of 

abdominal pain. Patient described off and on 3 days. She states is constant, 

described as a squeezing sensation in a band-like distribution. + nausea, 

vomiting.  She denies any fever or diarrhea.  Patient denies any abdominal 

surgical procedures.  She stateed is worse with movements, car ride.  Pain 

initially was upper abdomen however now pretty much generalized.  Last bowel 

movement was 3/15/2020. Nothing makes her pain better.  Patient denies any 

shortness of breath.  She does state the pain moves into her chest.  Denies any 

headache or visual changes.


Lipase 61792, , , Bilirubin 1.4.


CT abdomen confirms pancreatic inflammation, peripancreatic fluid and 

inflammatory changes around the pancreas consistent with pancreatitis. 

Cholelithiasis and 1.4cm uterine fibroid as well as possible left salpingitis. 

Admitted for further care


Gallstone pancreatitis with necrosis. 


   -CT A/P 6/6 showed multiple pseudocysts, slight larger on the right. s/p 

drains x 3, 6/7.  + PSAE (MDRO-R Cefepime, Zosyn ANSON < 64) and yeast, 


   -s/p drain 4/27. C. parapsilosis. s/p drain 5/6 + yeast & high amylase; s/p 

additional drain on 5/8. Drains removed. 


Ascites s/p paracentesis 4/15 & 5/6. C. parapsilosis 


JED. off HD. 





impression ============================











Acute hypoxic Respiratory failure required  mechanical ventilation


Tracheostomy


bilateral pleural effusions/pulm edema s/p Throacentesis on 6/15/2020


Severe Acute gallstone pancreatitis (not a surgical candidate at this time) with

necrosis


Acute kidney failure now requiring dialysis


Gallstones (Calculus of gallbladder with acute cholecystitis without 

obstruction)


HTN 


Intractable pain


Intractable nausea


Covid 19 negative. 


Acute on chronic anemia 


EEG: No seizure activityFever  - better currently - intermittent could be from 

underlying pancreatitis blood cults 5/4 - neg so far


? Ileus with vomiting


Abd distention - U/S and CT reviewed s/p 0.4 L of opaque, debris-containing 

ascites was removed 5/6


Acute pancreatitis with persistent necrosis


Gallstone pancreatitis with necrosis. 


   -CT A/P 6/6 showed multiple pseudocysts, slight larger on the right. s/p 

drains x 3, 6/7.  + PSAE (MDRO-R Cefepime, Zosyn ANSON < 64) and yeast, 


   -s/p drain 4/27. C. parapsilosis. s/p drain 5/6 + yeast & high amylase; s/p 

additional drain on 5/8. Drains removed. 


Ascites s/p paracentesis 4/15 & 5/6. C. parapsilosis 


JED. off HD. 


A large fluid collection in the pancreatic bed has slightly decreased in size, 

described below, the pancreas itself is difficult to  visualize, which could be 

due to necrosis or obscuration of pancreatic  parenchyma from the surrounding 

fluid collection.6/15 


- 4/27 status post ROBERT drain placement + C paropsilosis. s/p additional drains 

5/8


Anemia - S/p PRBCs


Cholelithiasis with thickening of the gallbladder wall.


Leucocytosis improving


JED, hyperkalemia, Metabolic acidosis off dialysis


hypocalcemia 


Prediabetes


HTN


s/p trach


ESRD on HD


Hyperglycemia


severe protein-caloric malnutrition


Moderate to large left pleural effusion with atelectasis and collapse  of most 

of the left lower lobe, stable








6/18 FEVER 101, BLOOD CULTURE X 2 


6/19  iv cipro added


6/20  vent fio2 35% 











FEN - 





PPX - SCDs, off lovenox currently, high risk for thrombosis but in light of her 

recent anemia and need for transfusion the risks do not outweigh benefits at 

this time. will continue to evaluate on a daily basis


FULL CODE


Dispo - ICU, critically ill


BACK ON VENT 6/17  vent // no distress


iv albumin 6/19


Continue meropenem, has MDRO PSAE June 7 


Continue micafungin June 7











33 MIN CC TIME





History of Present Illness


History of Present Illness


6/8: IR placed drain on 6/7. 4u PRBC after Hb drop. Hb 8.8 today. Off Levophed 

this morning. T-max 100.3. Much more lethargic today. CXR with left sided diffus

e infiltrates.


6/9: Tachycardic overnight into the 140s. NGT clamped. On BIPAP currently. 

Drains with serosanguinous discharge. WBC 8, Tmax 99.6F.


6/10: Seen on trach shield in ICU.  Hypertensive and tachycardic. Labs stable. 

blood stained drainage from drains. Afebrile.


6/11: Seen on trach shield in ICU. She is a bit confused, drowsy, but when 

sitting up is conversational and confusion somewhat clears. She is asking for 

more pain medication. Stable drains, still very tachy.Na 147


6/12: Patient vomited overnight. Aspirated. Tried to pull her trach out, she was

told she would die without her trach, she said "I know, I just want to go home".

Hb 7.6. Afebrile, still very tachycardic. 1055ml out of right sided ROBERT drain


6/13: Overnight hypoxic, on BIPAP. CXR with left sided white out lung. 

Significant mucous plug suctioned by RT with improvement in her ABG after 2 

hours this morning. Not really active, tired, lethargic. 890ml out of drains 

past 24 hours. On vent. D/w daughter bedside.


6/14: Still on vent overnight with copious pulmonary drainage on suctioning. 

Labs stable, UOP stable 1L. Drain output still significant with 1505mL. She has 

shown her phone password [id number] and made it clear she does not want her daughters 

to access her phone at this time.


6:15: Patient quite frail, she has had such a lengthy hospital stay that she is 

certainly depressed and as documented 3 days ago is wanting to go home. Most 

likely patient is also tired of being hospitalized with an end point no where in

sight. Reassurance and encouragement provided during my visit. Plans for 

thoracentesis later in the day 


6/16: No acute events reported overnight, case discussed with nursing staff 

patient in no acute distress, complaints of the abdomimal pain during my visit, 

patient is quite depressed, reassurance has been provided, discussed with 

nursing staff at bedside, replace electrolytes 


6/17 off vent / on TS , no distress








6/20 /2020


Patient seen and examined ICU BED


critically ill 


guardedlong-term prognosis 


Sputum from 6/13 - growing PSA - may be colonization I to Meropenem add Cipro


Continue meropenem, has MDRP PSAE June 7 from abd


cont micafungin June 7


bleeding from Sx. site RLQ and firmness)


max 1003 


oncology consulted











37 MIN CC TIME








Date:  Apr 27, 2020





Pre-Op Diagnosis:


Necrotizing pancreatitis





Post-Op Diagnosis:


same





Procedure Performed:


laparoscopic exploration





Surgeon:


Frandy Flores


Asst:  Dr. Luis Santos





Anesthesia Type:


GETA plus local





Blood Loss:


50





Specimans Obtained:


cultures, debris





Findings:


1000 cc ascites suctioned off, cultures sent, diffuse debris, obliteration of 

surgical planes preventing any meaningful exploration




















 





6/3/2020


Patient seen and examined in the ICU


She remains critically ill


We have been trying to hold off on her Ativan for the past 24 hours


She is a little more awake but shaky and agitated and anxious seems depressed


Discussed with RN


Chart reviewed








6/2/2020


Patient seen and examined in the ICU


She is a little more alert today but still quite ill


Appears clammy and pale and depressed/anxious


Discussed with RN


Chart reviewed











6/1/2020


Patient seen and examined in the ICU


She appears extremely ill


She is tachypneic at 35 respirations per minute and tachycardic at 132 bpm


She is extremely encephalopathic and shaky


She appears clammy


Chart reviewed


Discussed with RN


Prognosis extremely guarded at best








5/31/2020


Patient still in ICU


Resting with no apparent distress


Chart reviewed








5/30/2020


Patient seen and examined in the ICU


She is wiping her face with a cough


Discussed with RN


Chart reviewed


We hope to get her out of the ICU later today if possible








5/29/2020


Patient seen and examined in the ICU once again


She is back on NG suction


On IV Zosyn


Has IV TPN


Sedated with Precedex but anxious still


Appears somewhat clammy and pale


Chart reviewed


Discussed with RN


She remains critically ill











 


 


BRIEF OPERATIVE NOTE


Pre-Op Diagnosis


Pancreatitis with pseudocysts, suspected infection


Post-Op Diagnosis


same


Procedure Performed


CT abdominal Drains x 3


Surgeon


Hardy


Anesthesia Type:  Conscious Sedation


Findings


3 abdominal drains, 14F, with turbid pancreatic fluid and necrotic debris in 

each.


Complications


No immediate








5/9: Patient today somewhat restless and having bilious secretions from ET tube,

imaging studies ordered, discussed with consultant. Pretty poor prognosis, 

hopefully is not a fistula, poor surgical candidate. 





5/10: Imaging with no acute events, she seems more stable today compared to 

yesterday. Encouraged as much activity as possible patient at high risk for 

severe depression.





Vitals


Vitals





Vital Signs








  Date Time  Temp Pulse Resp B/P (MAP) Pulse Ox O2 Delivery O2 Flow Rate FiO2


 


6/22/20 09:00  117 22 119/67 (84) 97 Tracheal Collar  


 


6/22/20 08:48       8.0 


 


6/22/20 08:00 98.7       





 98.7       











Physical Exam


Physical Exam


GENERAL: Propped up in bed, resting quietly 


HEENT: NGT in place. 


NECK:  Tracheostomy 


LUNGS: Diminished aeration bases, no accessory muscle use - CT on left with 

clear fluid


HEART:  S1, S2,regular 


ABDOMEN: Mild distention, bowel sounds present, soft, grimaces to palpation, 

drains x 3


: [place] ( 6/7)


EXTREMITIES: + edema BLE


SKIN: no signs of gen rash 


LUE-PICC (5/29) without signs of complications


General:  Alert


Heart:  Regular rate (SR/ST), Other (distant heart sounds)


Lungs:  Other (diminshed in bases, Rhonci in LLL)


Abdomen:  Soft, Other (mildly TTP)


Extremities:  No cyanosis, Other (3+ bilateral LE pitting edema)


Skin:  No rashes, No significant lesion





Labs


LABS





Laboratory Tests








Test


 6/21/20


11:51 6/21/20


18:25 6/22/20


01:12


 


Glucose (Fingerstick)


 171 mg/dL


(70-99) 140 mg/dL


(70-99) 133 mg/dL


(70-99)











Assessment and Plan


Assessmemt and Plan


Problems


Medical Problems:


(1) Acute pancreatitis


Status: Acute  





(2) Cholelithiasis


Status: Acute  











Comment


Review of Relevant


I have reviewed the following items josy (where applicable) has been applied.


Labs





Laboratory Tests








Test


 6/20/20


13:03 6/20/20


23:59 6/21/20


05:15 6/21/20


11:51


 


Glucose (Fingerstick)


 170 mg/dL


(70-99) 132 mg/dL


(70-99) 


 171 mg/dL


(70-99)


 


White Blood Count


 


 


 10.3 x10^3/uL


(4.0-11.0) 





 


Red Blood Count


 


 


 3.10 x10^6/uL


(3.50-5.40) 





 


Hemoglobin


 


 


 9.0 g/dL


(12.0-15.5) 





 


Hematocrit


 


 


 26.3 %


(36.0-47.0) 





 


Mean Corpuscular Volume   85 fL ()  


 


Mean Corpuscular Hemoglobin   29 pg (25-35)  


 


Mean Corpuscular Hemoglobin


Concent 


 


 34 g/dL


(31-37) 





 


Red Cell Distribution Width


 


 


 17.4 %


(11.5-14.5) 





 


Platelet Count


 


 


 448 x10^3/uL


(140-400) 





 


Neutrophils (%) (Auto)   74 % (31-73)  


 


Lymphocytes (%) (Auto)   17 % (24-48)  


 


Monocytes (%) (Auto)   7 % (0-9)  


 


Eosinophils (%) (Auto)   1 % (0-3)  


 


Basophils (%) (Auto)   0 % (0-3)  


 


Neutrophils # (Auto)


 


 


 7.7 x10^3/uL


(1.8-7.7) 





 


Lymphocytes # (Auto)


 


 


 1.8 x10^3/uL


(1.0-4.8) 





 


Monocytes # (Auto)


 


 


 0.8 x10^3/uL


(0.0-1.1) 





 


Eosinophils # (Auto)


 


 


 0.1 x10^3/uL


(0.0-0.7) 





 


Basophils # (Auto)


 


 


 0.0 x10^3/uL


(0.0-0.2) 





 


Sodium Level


 


 


 136 mmol/L


(136-145) 





 


Potassium Level


 


 


 4.0 mmol/L


(3.5-5.1) 





 


Chloride Level


 


 


 102 mmol/L


() 





 


Carbon Dioxide Level


 


 


 29 mmol/L


(21-32) 





 


Anion Gap   5 (6-14)  


 


Blood Urea Nitrogen


 


 


 15 mg/dL


(7-20) 





 


Creatinine


 


 


 0.6 mg/dL


(0.6-1.0) 





 


Estimated GFR


(Cockcroft-Gault) 


 


 106.3 


 





 


BUN/Creatinine Ratio   25 (6-20)  


 


Glucose Level


 


 


 133 mg/dL


(70-99) 





 


Calcium Level


 


 


 9.8 mg/dL


(8.5-10.1) 





 


Total Bilirubin


 


 


 0.4 mg/dL


(0.2-1.0) 





 


Aspartate Amino Transf


(AST/SGOT) 


 


 19 U/L (15-37) 


 





 


Alanine Aminotransferase


(ALT/SGPT) 


 


 15 U/L (14-59) 


 





 


Alkaline Phosphatase


 


 


 113 U/L


() 





 


Total Protein


 


 


 4.3 g/dL


(6.4-8.2) 





 


Albumin


 


 


 1.0 g/dL


(3.4-5.0) 





 


Albumin/Globulin Ratio   0.3 (1.0-1.7)  


 


Test


 6/21/20


18:25 6/22/20


01:12 


 





 


Glucose (Fingerstick)


 140 mg/dL


(70-99) 133 mg/dL


(70-99) 


 











Laboratory Tests








Test


 6/21/20


11:51 6/21/20


18:25 6/22/20


01:12


 


Glucose (Fingerstick)


 171 mg/dL


(70-99) 140 mg/dL


(70-99) 133 mg/dL


(70-99)








Microbiology


6/18/20 Blood Culture - Preliminary, Resulted


          NO GROWTH AFTER 3 DAYS


6/15/20 Gram Stain - Final, Complete


          


6/15/20 Aerobic and Anaerobic Culture - Final, Complete


          


6/13/20 Gram Stain Evaluation - Final, Complete


          


6/13/20 Respiratory Culture - Final, Complete


          


6/13/20 Antimicrobic Susceptibility - Final, Complete


          


6/7/20 Urine Culture - Final, Complete


         


5/30/20 Gram Stain - Final, Complete


          


5/30/20 Aerobic Culture - Final, Complete


Medications





Current Medications


Sodium Chloride 1,000 ml @  1,000 mls/hr Q1H IV  Last administered on 3/16/20at 

03:00;  Start 3/16/20 at 03:00;  Stop 3/16/20 at 03:59;  Status DC


Ondansetron HCl (Zofran) 4 mg 1X  ONCE IVP  Last administered on 3/16/20at 

03:27;  Start 3/16/20 at 03:00;  Stop 3/16/20 at 03:01;  Status DC


Morphine Sulfate (Morphine Sulfate) 4 mg 1X  ONCE IV ;  Start 3/16/20 at 03:00; 

Stop 3/16/20 at 03:01;  Status Cancel


Ketorolac Tromethamine (Toradol 30mg Vial) 30 mg 1X  ONCE IV  Last administered 

on 3/16/20at 02:54;  Start 3/16/20 at 03:00;  Stop 3/16/20 at 03:01;  Status DC


Fentanyl Citrate (Fentanyl 2ml Vial) 25 mcg 1X  ONCE IVP  Last administered on 

3/16/20at 03:23;  Start 3/16/20 at 03:30;  Stop 3/16/20 at 03:31;  Status DC


Fentanyl Citrate (Fentanyl 2ml Vial) 100 mcg STK-MED ONCE .ROUTE ;  Start 

3/16/20 at 03:18;  Stop 3/16/20 at 03:18;  Status DC


Iohexol (Omnipaque 350 Mg/ml) 90 ml 1X  ONCE IV  Last administered on 3/16/20at 

03:25;  Start 3/16/20 at 03:30;  Stop 3/16/20 at 03:31;  Status DC


Info (CONTRAST GIVEN -- Rx MONITORING) 1 each PRN DAILY  PRN MC SEE COMMENTS;  

Start 3/16/20 at 03:30;  Stop 3/18/20 at 03:29;  Status DC


Hydromorphone HCl (Dilaudid) 0.5 mg 1X  ONCE IV  Last administered on 3/16/20at 

03:55;  Start 3/16/20 at 04:30;  Stop 3/16/20 at 04:32;  Status DC


Ondansetron HCl (Zofran) 4 mg PRN Q8HRS  PRN IV NAUSEA/VOMITING 1ST CHOICE;  

Start 3/16/20 at 05:00;  Stop 3/16/20 at 09:27;  Status DC


Morphine Sulfate (Morphine Sulfate) 2 mg PRN Q2HR  PRN IV SEVERE PAIN 7-10 Last 

administered on 3/17/20at 12:26;  Start 3/16/20 at 05:00;  Stop 3/17/20 at 

14:15;  Status DC


Sodium Chloride 1,000 ml @  125 mls/hr Q8H IV  Last administered on 3/16/20at 

20:56;  Start 3/16/20 at 05:00;  Stop 3/17/20 at 04:59;  Status DC


Hydromorphone HCl (Dilaudid) 0.5 mg PRN Q3HRS  PRN IV SEVERE PAIN 7-10 Last 

administered on 3/17/20at 10:06;  Start 3/16/20 at 05:00;  Stop 3/17/20 at 

12:01;  Status DC


Piperacillin Sod/ Tazobactam Sod 4.5 gm/Sodium Chloride 100 ml @  200 mls/hr 1X 

ONCE IV  Last administered on 3/16/20at 05:44;  Start 3/16/20 at 06:00;  Stop 

3/16/20 at 06:29;  Status DC


Ondansetron HCl (Zofran) 4 mg PRN Q4HRS  PRN IV NAUSEA/VOMITING 1ST CHOICE Last 

administered on 6/21/20at 08:51;  Start 3/16/20 at 09:30


Insulin Human Lispro (HumaLOG) 0-9 UNITS Q6HRS SQ  Last administered on 

6/21/20at 12:02;  Start 3/16/20 at 09:30


Dextrose (Dextrose 50%-Water Syringe) 12.5 gm PRN Q15MIN  PRN IV SEE COMMENTS;  

Start 3/16/20 at 09:30


Pantoprazole Sodium (PROTONIX VIAL for IV PUSH) 40 mg DAILYAC IVP  Last 

administered on 6/22/20at 08:55;  Start 3/16/20 at 11:30


Prochlorperazine Edisylate (Compazine) 10 mg PRN Q6HRS  PRN IV NAUSEA/VOMITING, 

2nd CHOICE Last administered on 6/20/20at 23:50;  Start 3/16/20 at 17:45


Atenolol (Tenormin) 100 mg DAILY PO ;  Start 3/17/20 at 09:00;  Stop 3/16/20 at 

20:08;  Status DC


Metoprolol Tartrate (Lopressor Vial) 2.5 mg Q6HRS IVP  Last administered on 

3/17/20at 05:51;  Start 3/16/20 at 20:15;  Stop 3/17/20 at 10:02;  Status DC


Metoprolol Tartrate (Lopressor Vial) 5 mg Q6HRS IVP  Last administered on 

3/26/20at 00:12;  Start 3/17/20 at 10:15;  Stop 3/28/20 at 08:48;  Status DC


Hydromorphone HCl (Dilaudid) 1 mg PRN Q3HRS  PRN IV SEVERE PAIN 7-10 Last 

administered on 3/23/20at 05:13;  Start 3/17/20 at 12:00;  Stop 3/31/20 at 

00:25;  Status DC


Lidocaine HCl (Buffered Lidocaine 1%) 3 ml STK-MED ONCE .ROUTE ;  Start 3/17/20 

at 12:55;  Stop 3/17/20 at 12:56;  Status DC


Albumin Human 500 ml @  125 mls/hr 1X  ONCE IV  Last administered on 3/17/20at 

14:33;  Start 3/17/20 at 14:30;  Stop 3/17/20 at 18:32;  Status DC


Norepinephrine Bitartrate 8 mg/ Dextrose 258 ml @  17.299 mls/ hr CONT  PRN IV 

PER PROTOCOL Last administered on 4/14/20at 12:48;  Start 3/17/20 at 15:30;  

Stop 4/17/20 at 09:19;  Status DC


Sodium Chloride 1,000 ml @  125 mls/hr Q8H IV  Last administered on 3/17/20at 

21:04;  Start 3/17/20 at 16:00;  Stop 3/18/20 at 02:42;  Status DC


Albumin Human 500 ml @  125 mls/hr PRN BID  PRN IV After every 2L NSS & BP < 

90mm Last administered on 6/6/20at 11:40;  Start 3/17/20 at 16:00


Iohexol (Omnipaque 300 Mg/ml) 60 ml 1X  ONCE IV  Last administered on 3/17/20at 

17:20;  Start 3/17/20 at 17:00;  Stop 3/17/20 at 17:01;  Status DC


Info (CONTRAST GIVEN -- Rx MONITORING) 1 each PRN DAILY  PRN MC SEE COMMENTS;  

Start 3/17/20 at 17:00;  Stop 3/19/20 at 16:59;  Status DC


Meropenem 1 gm/ Sodium Chloride 100 ml @  200 mls/hr Q8HRS IV  Last administered

on 3/18/20at 05:45;  Start 3/17/20 at 20:00;  Stop 3/18/20 at 08:48;  Status DC


Furosemide (Lasix) 40 mg 1X  ONCE IVP  Last administered on 3/17/20at 22:12;  

Start 3/17/20 at 22:30;  Stop 3/17/20 at 22:31;  Status DC


Calcium Chloride 1000 mg/Sodium Chloride 110 ml @  220 mls/hr 1X  ONCE IV  Last 

administered on 3/17/20at 22:11;  Start 3/17/20 at 22:30;  Stop 3/17/20 at 

22:59;  Status DC


Albuterol Sulfate (Ventolin Neb Soln) 2.5 mg 1X  ONCE NEB  Last administered on 

3/18/20at 00:56;  Start 3/17/20 at 22:30;  Stop 3/17/20 at 22:31;  Status DC


Insulin Human Regular (HumuLIN R VIAL) 5 unit 1X  ONCE IV  Last administered on 

3/17/20at 22:14;  Start 3/17/20 at 22:30;  Stop 3/17/20 at 22:31;  Status DC


Magnesium Sulfate 50 ml @ 25 mls/hr 1X  ONCE IV  Last administered on 3/18/20at 

02:57;  Start 3/18/20 at 03:00;  Stop 3/18/20 at 04:59;  Status DC


Calcium Gluconate 1000 mg/Sodium Chloride 110 ml @  220 mls/hr 1X  ONCE IV  Last

administered on 3/18/20at 02:46;  Start 3/18/20 at 03:00;  Stop 3/18/20 at 

03:29;  Status DC


Sodium Chloride 1,000 ml @  200 mls/hr Q5H IV  Last administered on 3/18/20at 

02:46;  Start 3/18/20 at 03:00;  Stop 3/18/20 at 10:21;  Status DC


Calcium Gluconate 1000 mg/Sodium Chloride 110 ml @  220 mls/hr 1X  ONCE IV  Last

administered on 3/18/20at 03:21;  Start 3/18/20 at 03:30;  Stop 3/18/20 at 

03:59;  Status DC


Sodium Bicarbonate 50 meq/Sodium Chloride 1,050 ml @  75 mls/hr Q14H IV  Last 

administered on 3/22/20at 21:10;  Start 3/18/20 at 07:30;  Stop 3/23/20 at 

10:28;  Status DC


Calcium Gluconate 2000 mg/Sodium Chloride 120 ml @  220 mls/hr 1X  ONCE IV  Last

administered on 3/18/20at 09:05;  Start 3/18/20 at 07:30;  Stop 3/18/20 at 

08:02;  Status DC


Lidocaine HCl (Xylocaine-Mpf 1% 2ml Vial) 2 ml STK-MED ONCE .ROUTE ;  Start 

3/18/20 at 08:47;  Stop 3/18/20 at 08:47;  Status DC


Meropenem 500 mg/ Sodium Chloride 50 ml @  100 mls/hr Q12HR IV  Last 

administered on 3/23/20at 21:01;  Start 3/18/20 at 18:00;  Stop 3/24/20 at 

07:58;  Status DC


Lidocaine HCl (Buffered Lidocaine 1%) 3 ml STK-MED ONCE .ROUTE ;  Start 3/18/20 

at 09:46;  Stop 3/18/20 at 09:46;  Status DC


Lidocaine HCl (Buffered Lidocaine 1%) 6 ml 1X  ONCE INJ  Last administered on 

3/18/20at 10:26;  Start 3/18/20 at 10:15;  Stop 3/18/20 at 10:16;  Status DC


Info (Tpn Per Pharmacy) 1 each PRN DAILY  PRN MC SEE COMMENTS Last administered 

on 6/21/20at 11:19;  Start 3/18/20 at 12:00


Sodium Chloride 1,000 ml @  1,000 mls/hr Q1H PRN IV hypotension;  Start 3/18/20 

at 12:07;  Stop 3/18/20 at 18:06;  Status DC


Diphenhydramine HCl (Benadryl) 25 mg 1X PRN  PRN IV ITCHING;  Start 3/18/20 at 

12:15;  Stop 3/19/20 at 12:14;  Status DC


Diphenhydramine HCl (Benadryl) 25 mg 1X PRN  PRN IV ITCHING;  Start 3/18/20 at 

12:15;  Stop 3/19/20 at 12:14;  Status DC


Sodium Chloride 1,000 ml @  400 mls/hr Q2H30M PRN IV PATENCY;  Start 3/18/20 at 

12:07;  Stop 3/19/20 at 00:06;  Status DC


Info (PHARMACY MONITORING -- do not chart) 1 each PRN DAILY  PRN MC SEE 

COMMENTS;  Start 3/18/20 at 12:15;  Stop 3/20/20 at 08:13;  Status DC


Sodium Chloride 90 meq/Calcium Gluconate 10 meq/ Multivitamins 10 ml/Chromium/ 

Copper/Manganese/ Seleni/Zn 1 ml/ Total Parenteral Nutrition/Amino 

Acids/Dextrose/ Fat Emulsion Intravenous 55.005 ml  @ 2.292 mls/hr TPN  CONT IV 

;  Start 3/18/20 at 22:00;  Stop 3/18/20 at 12:33;  Status DC


Info (Tpn Per Pharmacy) 1 each PRN DAILY  PRN MC SEE COMMENTS;  Start 3/18/20 at

12:30;  Status UNV


Sodium Chloride 90 meq/Calcium Gluconate 10 meq/ Multivitamins 10 ml/Chromium/ 

Copper/Manganese/ Seleni/Zn 0.5 ml/ Total Parenteral Nutrition/Amino 

Acids/Dextrose/ Fat Emulsion Intravenous 1,512 ml @  63 mls/hr TPN  CONT IV  

Last administered on 3/18/20at 22:06;  Start 3/18/20 at 22:00;  Stop 3/19/20 at 

21:59;  Status DC


Calcium Carbonate/ Glycine (Tums) 500 mg PRN AFTMEALHC  PRN PO INDIGESTION;  

Start 3/18/20 at 17:45;  Stop 5/13/20 at 10:25;  Status DC


Calcium Gluconate (Calcium Gluconate) 2,000 mg 1X  ONCE IVP  Last administered 

on 3/19/20at 02:19;  Start 3/19/20 at 02:15;  Stop 3/19/20 at 02:16;  Status DC


Calcium Chloride 3000 mg/Sodium Chloride 1,030 ml @  50 mls/hr [id number] IV  Last 

administered on 3/21/20at 02:17;  Start 3/19/20 at 08:00;  Stop 3/21/20 at 

15:23;  Status DC


Lorazepam (Ativan Inj) 1 mg PRN Q4HRS  PRN IVP ANXIETY / AGITATION, 2nd choic 

Last administered on 4/17/20at 03:51;  Start 3/19/20 at 09:00;  Stop 4/17/20 at 

09:19;  Status DC


Sodium Chloride 1,000 ml @  1,000 mls/hr Q1H PRN IV hypotension;  Start 3/19/20 

at 08:56;  Stop 3/19/20 at 14:55;  Status DC


Albumin Human 200 ml @  200 mls/hr 1X PRN  PRN IV Hypotension;  Start 3/19/20 at

09:00;  Stop 3/19/20 at 14:59;  Status DC


Diphenhydramine HCl (Benadryl) 25 mg 1X PRN  PRN IV ITCHING;  Start 3/19/20 at 

09:00;  Stop 3/20/20 at 08:59;  Status DC


Diphenhydramine HCl (Benadryl) 25 mg 1X PRN  PRN IV ITCHING;  Start 3/19/20 at 

09:00;  Stop 3/20/20 at 08:59;  Status DC


Sodium Chloride 1,000 ml @  400 mls/hr Q2H30M PRN IV PATENCY;  Start 3/19/20 at 

08:56;  Stop 3/19/20 at 20:55;  Status DC


Info (PHARMACY MONITORING -- do not chart) 1 each PRN DAILY  PRN MC SEE 

COMMENTS;  Start 3/19/20 at 09:00;  Status UNV


Info (PHARMACY MONITORING -- do not chart) 1 each PRN DAILY  PRN MC SEE 

COMMENTS;  Start 3/19/20 at 09:00;  Stop 3/20/20 at 08:13;  Status DC


Digoxin (Lanoxin) 500 mcg 1X  ONCE IV  Last administered on 3/19/20at 10:04;  

Start 3/19/20 at 10:00;  Stop 3/19/20 at 10:01;  Status DC


Digoxin (Lanoxin) 125 mcg 1X  ONCE IV  Last administered on 3/19/20at 17:10;  

Start 3/19/20 at 18:00;  Stop 3/19/20 at 18:01;  Status DC


Magnesium Sulfate 100 ml @  25 mls/hr 1X  ONCE IV  Last administered on 

3/19/20at 12:48;  Start 3/19/20 at 13:00;  Stop 3/19/20 at 16:59;  Status DC


Sodium Chloride 90 meq/Magnesium Sulfate 10 meq/ Calcium Gluconate 20 meq/ 

Multivitamins 10 ml/Chromium/ Copper/Manganese/ Seleni/Zn 0.5 ml/ Total 

Parenteral Nutrition/Amino Acids/Dextrose/ Fat Emulsion Intravenous 1,512 ml @  

63 mls/hr TPN  CONT IV  Last administered on 3/19/20at 22:25;  Start 3/19/20 at 

22:00;  Stop 3/20/20 at 21:59;  Status DC


Sodium Chloride 1,000 ml @  1,000 mls/hr Q1H PRN IV hypotension;  Start 3/20/20 

at 08:05;  Stop 3/20/20 at 14:04;  Status DC


Albumin Human 200 ml @  200 mls/hr 1X  ONCE IV  Last administered on 3/20/20at 

08:57;  Start 3/20/20 at 08:15;  Stop 3/20/20 at 09:14;  Status DC


Diphenhydramine HCl (Benadryl) 25 mg 1X PRN  PRN IV ITCHING;  Start 3/20/20 at 

08:15;  Stop 3/21/20 at 08:14;  Status DC


Diphenhydramine HCl (Benadryl) 25 mg 1X PRN  PRN IV ITCHING;  Start 3/20/20 at 

08:15;  Stop 3/21/20 at 08:14;  Status DC


Sodium Chloride 1,000 ml @  400 mls/hr Q2H30M PRN IV PATENCY;  Start 3/20/20 at 

08:05;  Stop 3/20/20 at 20:04;  Status DC


Info (PHARMACY MONITORING -- do not chart) 1 each PRN DAILY  PRN MC SEE 

COMMENTS;  Start 3/20/20 at 08:15;  Stop 3/24/20 at 07:57;  Status DC


Sodium Chloride 90 meq/Potassium Chloride 15 meq/ Potassium Phosphate 10 mmol/ 

Magnesium Sulfate 10 meq/Calcium Gluconate 20 meq/ Multivitamins 10 ml/Chromium/

Copper/Manganese/ Seleni/Zn 0.5 ml/ Total Parenteral Nutrition/Amino 

Acids/Dextrose/ Fat Emulsion Intravenous 1,512 ml @  63 mls/hr TPN  CONT IV  

Last administered on 3/20/20at 21:01;  Start 3/20/20 at 22:00;  Stop 3/21/20 at 

21:59;  Status DC


Potassium Chloride/Water 100 ml @  100 mls/hr 1X  ONCE IV  Last administered on 

3/20/20at 14:09;  Start 3/20/20 at 14:00;  Stop 3/20/20 at 14:59;  Status DC


Benzocaine (Hurricaine One) 1 spray 1X  ONCE MM  Last administered on 3/20/20at 

16:38;  Start 3/20/20 at 14:30;  Stop 3/20/20 at 14:31;  Status DC


Lidocaine HCl (Glydo (Lidocaine) Jelly) 1 thomas 1X  ONCE MM  Last administered on 

3/20/20at 16:38;  Start 3/20/20 at 14:30;  Stop 3/20/20 at 14:31;  Status DC


Linezolid/Dextrose 300 ml @  300 mls/hr Q12HR IV  Last administered on 3/26/20at

21:04;  Start 3/20/20 at 20:00;  Stop 3/27/20 at 07:50;  Status DC


Acetaminophen (Tylenol) 650 mg PRN Q6HRS  PRN PO MILD PAIN / TEMP;  Start 

3/21/20 at 03:30;  Stop 3/21/20 at 03:36;  Status DC


Acetaminophen (Tylenol) 650 mg PRN Q6HRS  PRN PEG MILD PAIN / TEMP Last 

administered on 4/16/20at 19:56;  Start 3/21/20 at 03:36;  Stop 5/13/20 at 10:

25;  Status DC


Sodium Chloride 1,000 ml @  1,000 mls/hr Q1H PRN IV hypotension;  Start 3/21/20 

at 07:50;  Stop 3/21/20 at 13:49;  Status DC


Albumin Human 200 ml @  200 mls/hr 1X PRN  PRN IV Hypotension;  Start 3/21/20 at

08:00;  Stop 3/21/20 at 13:59;  Status DC


Sodium Chloride (Normal Saline Flush) 10 ml 1X PRN  PRN IV AP catheter pack;  

Start 3/21/20 at 08:00;  Stop 3/22/20 at 07:59;  Status DC


Sodium Chloride (Normal Saline Flush) 10 ml 1X PRN  PRN IV  catheter pack;  

Start 3/21/20 at 08:00;  Stop 3/22/20 at 07:59;  Status DC


Sodium Chloride 1,000 ml @  400 mls/hr Q2H30M PRN IV PATENCY;  Start 3/21/20 at 

07:50;  Stop 3/21/20 at 19:49;  Status DC


Info (PHARMACY MONITORING -- do not chart) 1 each PRN DAILY  PRN MC SEE 

COMMENTS;  Start 3/21/20 at 08:00;  Status UNV


Info (PHARMACY MONITORING -- do not chart) 1 each PRN DAILY  PRN MC SEE 

COMMENTS;  Start 3/21/20 at 08:00;  Stop 3/23/20 at 08:25;  Status DC


Sodium Chloride 90 meq/Potassium Chloride 15 meq/ Potassium Phosphate 10 mmol/ 

Magnesium Sulfate 10 meq/Calcium Gluconate 20 meq/ Multivitamins 10 ml/Chromium/

Copper/Manganese/ Seleni/Zn 0.5 ml/ Total Parenteral Nutrition/Amino Aci

ds/Dextrose/ Fat Emulsion Intravenous 1,512 ml @  63 mls/hr TPN  CONT IV  Last 

administered on 3/21/20at 20:57;  Start 3/21/20 at 22:00;  Stop 3/22/20 at 

21:59;  Status DC


Sodium Chloride 90 meq/Potassium Chloride 15 meq/ Potassium Phosphate 15 mmol/ 

Magnesium Sulfate 10 meq/Calcium Gluconate 20 meq/ Multivitamins 10 ml/Chromium/

Copper/Manganese/ Seleni/Zn 0.5 ml/ Total Parenteral Nutrition/Amino 

Acids/Dextrose/ Fat Emulsion Intravenous 1,512 ml @  63 mls/hr TPN  CONT IV ;  

Start 3/22/20 at 22:00;  Stop 3/22/20 at 14:16;  Status DC


Sodium Chloride 90 meq/Potassium Chloride 15 meq/ Potassium Phosphate 15 mmol/ 

Magnesium Sulfate 10 meq/Calcium Gluconate 20 meq/ Multivitamins 10 ml/Chromium/

Copper/Manganese/ Seleni/Zn 0.5 ml/ Total Parenteral Nutrition/Amino 

Acids/Dextrose/ Fat Emulsion Intravenous 1,200 ml @  50 mls/hr TPN  CONT IV ;  

Start 3/22/20 at 22:00;  Stop 3/22/20 at 14:17;  Status DC


Sodium Chloride 90 meq/Potassium Chloride 15 meq/ Potassium Phosphate 10 mmol/ 

Magnesium Sulfate 10 meq/Calcium Gluconate 20 meq/ Multivitamins 10 ml/Chromium/

Copper/Manganese/ Seleni/Zn 0.5 ml/ Total Parenteral Nutrition/Amino 

Acids/Dextrose/ Fat Emulsion Intravenous 1,200 ml @  50 mls/hr TPN  CONT IV  

Last administered on 3/22/20at 23:29;  Start 3/22/20 at 22:00;  Stop 3/23/20 at 

21:59;  Status DC


Sodium Chloride 1,000 ml @  1,000 mls/hr Q1H PRN IV hypotension;  Start 3/23/20 

at 07:28;  Stop 3/23/20 at 13:27;  Status DC


Albumin Human 200 ml @  200 mls/hr 1X  ONCE IV  Last administered on 3/23/20at 

08:51;  Start 3/23/20 at 07:30;  Stop 3/23/20 at 08:29;  Status DC


Diphenhydramine HCl (Benadryl) 25 mg 1X PRN  PRN IV ITCHING;  Start 3/23/20 at 

07:30;  Stop 3/24/20 at 07:29;  Status DC


Diphenhydramine HCl (Benadryl) 25 mg 1X PRN  PRN IV ITCHING;  Start 3/23/20 at 

07:30;  Stop 3/24/20 at 07:29;  Status DC


Sodium Chloride 1,000 ml @  400 mls/hr Q2H30M PRN IV PATENCY;  Start 3/23/20 at 

07:28;  Stop 3/23/20 at 19:27;  Status DC


Info (PHARMACY MONITORING -- do not chart) 1 each PRN DAILY  PRN MC SEE 

COMMENTS;  Start 3/23/20 at 07:30;  Stop 4/3/20 at 13:01;  Status DC


Metronidazole 100 ml @  100 mls/hr Q6HRS IV  Last administered on 4/8/20at 

06:26;  Start 3/23/20 at 08:30;  Stop 4/8/20 at 09:58;  Status DC


Micafungin Sodium 100 mg/Dextrose 100 ml @  100 mls/hr Q24H IV  Last administ

ered on 4/30/20at 08:18;  Start 3/23/20 at 09:00;  Stop 4/30/20 at 20:58;  

Status DC


Propofol 0 ml @ As Directed STK-MED ONCE IV ;  Start 3/23/20 at 07:53;  Stop 

3/23/20 at 07:53;  Status DC


Etomidate (Amidate) 20 mg STK-MED ONCE IV ;  Start 3/23/20 at 07:53;  Stop 

3/23/20 at 07:54;  Status DC


Midazolam HCl (Versed) 5 mg STK-MED ONCE .ROUTE ;  Start 3/23/20 at 07:57;  Stop

3/23/20 at 07:57;  Status DC


Fentanyl Citrate 30 ml @ 0 mls/hr CONT  PRN IV SEE PROTOCOL Last administered on

4/17/20at 06:12;  Start 3/23/20 at 08:15;  Stop 4/17/20 at 09:19;  Status DC


Artificial Tears (Artificial Tears) 1 drop PRN Q1HR  PRN OU DRY EYE, 1st choice;

 Start 3/23/20 at 08:15;  Stop 4/29/20 at 05:31;  Status DC


Midazolam HCl 50 mg/Sodium Chloride 50 ml @ 0 mls/hr CONT  PRN IV SEE PROTOCOL 

Last administered on 3/26/20at 22:39;  Start 3/23/20 at 08:15;  Stop 3/28/20 at 

15:59;  Status DC


Etomidate (Amidate) 8 mg 1X  ONCE IV  Last administered on 3/23/20at 08:33;  

Start 3/23/20 at 08:30;  Stop 3/23/20 at 08:31;  Status DC


Succinylcholine Chloride (Anectine) 120 mg 1X  ONCE IV  Last administered on 

3/23/20at 08:34;  Start 3/23/20 at 08:30;  Stop 3/23/20 at 08:31;  Status DC


Midazolam HCl (Versed) 5 mg 1X  ONCE IV ;  Start 3/23/20 at 08:30;  Stop 3/23/20

at 08:31;  Status DC


Potassium Chloride 15 meq/ Bicarbonate Dialysis Soln w/ out KCl 5,007.5 ml  @ 

1,000 mls/ hr Q5H1M IV  Last administered on 3/24/20at 11:11;  Start 3/23/20 at 

12:00;  Stop 3/24/20 at 11:15;  Status DC


Potassium Chloride 15 meq/ Bicarbonate Dialysis Soln w/ out KCl 5,007.5 ml  @ 

1,000 mls/ hr Q5H1M IV  Last administered on 3/24/20at 11:12;  Start 3/23/20 at 

12:00;  Stop 3/24/20 at 11:17;  Status DC


Potassium Chloride 15 meq/ Bicarbonate Dialysis Soln w/ out KCl 5,007.5 ml  @ 

1,000 mls/ hr Q5H1M IV  Last administered on 3/24/20at 11:11;  Start 3/23/20 at 

12:00;  Stop 3/24/20 at 11:19;  Status DC


Sodium Chloride 90 meq/Potassium Chloride 15 meq/ Potassium Phosphate 10 mmol/ 

Magnesium Sulfate 10 meq/Calcium Gluconate 20 meq/ Multivitamins 10 ml/Chromium/

Copper/Manganese/ Seleni/Zn 0.5 ml/ Total Parenteral Nutrition/Amino 

Acids/Dextrose/ Fat Emulsion Intravenous 1,400 ml @  58.333 mls/ hr TPN  CONT IV

 Last administered on 3/23/20at 21:42;  Start 3/23/20 at 22:00;  Stop 3/24/20 at

21:59;  Status DC


Heparin Sodium (Porcine) (Heparin Sodium) 5,000 unit Q8HRS SQ  Last administered

on 3/28/20at 05:55;  Start 3/23/20 at 15:00;  Stop 3/28/20 at 13:28;  Status DC


Meropenem 500 mg/ Sodium Chloride 50 ml @  100 mls/hr Q6HRS IV  Last 

administered on 3/25/20at 06:00;  Start 3/24/20 at 09:00;  Stop 3/25/20 at 

07:29;  Status DC


Potassium Phosphate 20 mmol/ Sodium Chloride 106.6667 ml @  51.667 m... 1X  ONCE

IV  Last administered on 3/24/20at 11:22;  Start 3/24/20 at 10:15;  Stop 3/24/20

at 12:18;  Status DC


Acetaminophen (Tylenol Supp) 650 mg PRN Q6HRS  PRN AL MILD PAIN / TEMP > 100.3'F

Last administered on 6/18/20at 10:16;  Start 3/24/20 at 10:30


Potassium Chloride/Water 100 ml @  100 mls/hr Q1H IV  Last administered on 

3/24/20at 12:12;  Start 3/24/20 at 11:00;  Stop 3/24/20 at 12:59;  Status DC


Potassium Chloride 20 meq/ Bicarbonate Dialysis Soln w/ out KCl 5,010 ml @  

1,000 mls/hr Q5H1M IV  Last administered on 3/25/20at 08:48;  Start 3/24/20 at 

12:00;  Stop 3/25/20 at 13:03;  Status DC


Potassium Chloride 20 meq/ Bicarbonate Dialysis Soln w/ out KCl 5,010 ml @  

1,000 mls/hr Q5H1M IV  Last administered on 3/29/20at 14:52;  Start 3/24/20 at 

11:30;  Stop 3/29/20 at 19:59;  Status DC


Potassium Chloride 20 meq/ Bicarbonate Dialysis Soln w/ out KCl 5,010 ml @  

1,000 mls/hr Q5H1M IV  Last administered on 3/29/20at 14:53;  Start 3/24/20 at 

11:30;  Stop 3/29/20 at 19:59;  Status DC


Sodium Chloride 90 meq/Potassium Chloride 15 meq/ Potassium Phosphate 15 mmol/ 

Magnesium Sulfate 10 meq/Calcium Gluconate 15 meq/ Multivitamins 10 ml/Chromium/

Copper/Manganese/ Seleni/Zn 0.5 ml/ Total Parenteral Nutrition/Amino 

Acids/Dextrose/ Fat Emulsion Intravenous 1,400 ml @  58.333 mls/ hr TPN  CONT IV

 Last administered on 3/24/20at 22:17;  Start 3/24/20 at 22:00;  Stop 3/25/20 at

21:59;  Status DC


Cefepime HCl (Maxipime) 2 gm Q12HR IVP  Last administered on 4/7/20at 20:56;  

Start 3/25/20 at 09:00;  Stop 4/8/20 at 09:58;  Status DC


Daptomycin 500 mg/ Sodium Chloride 50 ml @  100 mls/hr Q48H IV  Last 

administered on 4/10/20at 09:57;  Start 3/25/20 at 08:30;  Stop 4/10/20 at 

10:07;  Status DC


Lidocaine HCl (Buffered Lidocaine 1%) 3 ml 1X  ONCE INJ  Last administered on 

3/25/20at 10:27;  Start 3/25/20 at 10:30;  Stop 3/25/20 at 10:31;  Status DC


Potassium Phosphate 20 mmol/ Sodium Chloride 106.6667 ml @  51.667 m... 1X  ONCE

IV  Last administered on 3/25/20at 12:51;  Start 3/25/20 at 13:00;  Stop 3/25/20

at 15:03;  Status DC


Sodium Chloride 90 meq/Potassium Chloride 15 meq/ Potassium Phosphate 18 mmol/ 

Magnesium Sulfate 8 meq/Calcium Gluconate 15 meq/ Multivitamins 10 ml/Chromium/ 

Copper/Manganese/ Seleni/Zn 0.5 ml/ Total Parenteral Nutrition/Amino 

Acids/Dextrose/ Fat Emulsion Intravenous 1,400 ml @  58.333 mls/ hr TPN  CONT IV

 Last administered on 3/25/20at 22:16;  Start 3/25/20 at 22:00;  Stop 3/26/20 at

21:59;  Status DC


Potassium Chloride 20 meq/ Bicarbonate Dialysis Soln w/ out KCl 5,010 ml @  

1,000 mls/hr Q5H1M IV  Last administered on 3/29/20at 14:54;  Start 3/25/20 at 

16:00;  Stop 3/29/20 at 19:59;  Status DC


Multi-Ingred Cream/Lotion/Oil/ Oint (Artificial Tears Eye Ointment) 1 thomas PRN 

Q1HR  PRN OU DRY EYE, 2nd choice Last administered on 4/13/20at 08:19;  Start 

3/25/20 at 17:30;  Stop 6/3/20 at 14:39;  Status DC


Sodium Chloride 90 meq/Potassium Chloride 15 meq/ Potassium Phosphate 18 mmol/ 

Magnesium Sulfate 8 meq/Calcium Gluconate 15 meq/ Multivitamins 10 ml/Chromium/ 

Copper/Manganese/ Seleni/Zn 0.5 ml/ Total Parenteral Nutrition/Amino 

Acids/Dextrose/ Fat Emulsion Intravenous 1,400 ml @  58.333 mls/ hr TPN  CONT IV

 Last administered on 3/26/20at 22:00;  Start 3/26/20 at 22:00;  Stop 3/27/20 at

21:59;  Status DC


Albumin Human 500 ml @  125 mls/hr 1X  ONCE IV ;  Start 3/26/20 at 14:15;  Stop 

3/26/20 at 18:14;  Status DC


Sodium Chloride 90 meq/Potassium Chloride 15 meq/ Potassium Phosphate 18 mmol/ 

Magnesium Sulfate 8 meq/Calcium Gluconate 15 meq/ Multivitamins 10 ml/Chromium/ 

Copper/Manganese/ Seleni/Zn 0.5 ml/ Insulin Human Regular 10 unit/ Total 

Parenteral Nutrition/Amino Acids/Dextrose/ Fat Emulsion Intravenous 1,400 ml @  

58.333 mls/ hr TPN  CONT IV  Last administered on 3/27/20at 21:43;  Start 

3/27/20 at 22:00;  Stop 3/28/20 at 21:59;  Status DC


Lidocaine HCl (Buffered Lidocaine 1%) 3 ml STK-MED ONCE .ROUTE ;  Start 3/25/20 

at 10:00;  Stop 3/27/20 at 13:57;  Status DC


Midazolam HCl 100 mg/Sodium Chloride 100 ml @ 7 mls/hr CONT  PRN IV SEE PROTOCOL

Last administered on 4/8/20at 15:35;  Start 3/28/20 at 16:00;  Stop 6/3/20 at 

14:38;  Status DC


Sodium Chloride 90 meq/Potassium Chloride 15 meq/ Potassium Phosphate 18 mmol/ 

Magnesium Sulfate 8 meq/Calcium Gluconate 15 meq/ Multivitamins 10 ml/Chromium/ 

Copper/Manganese/ Seleni/Zn 0.5 ml/ Insulin Human Regular 15 unit/ Total 

Parenteral Nutrition/Amino Acids/Dextrose/ Fat Emulsion Intravenous 1,400 ml @  

58.333 mls/ hr TPN  CONT IV  Last administered on 3/28/20at 20:34;  Start 

3/28/20 at 22:00;  Stop 3/29/20 at 21:59;  Status DC


Info ([facility] Electrolyte Protocol) 1 ea CONT PRN  PRN MC PER PROTOCOL;  Start 

3/29/20 at 13:15


Sodium Chloride 90 meq/Potassium Chloride 15 meq/ Potassium Phosphate 18 mmol/ 

Magnesium Sulfate 8 meq/Calcium Gluconate 15 meq/ Multivitamins 10 ml/Chromium/ 

Copper/Manganese/ Seleni/Zn 0.5 ml/ Insulin Human Regular 15 unit/ Total 

Parenteral Nutrition/Amino Acids/Dextrose/ Fat Emulsion Intravenous 1,400 ml @  

58.333 mls/ hr TPN  CONT IV  Last administered on 3/29/20at 22:05;  Start 

3/29/20 at 22:00;  Stop 3/30/20 at 21:59;  Status DC


Potassium Chloride 15 meq/ Bicarbonate Dialysis Soln w/ out KCl 5,007.5 ml  @ 

1,000 mls/ hr Q5H1M IV  Last administered on 4/1/20at 18:14;  Start 3/29/20 at 

20:00;  Stop 4/2/20 at 13:08;  Status DC


Potassium Chloride 15 meq/ Bicarbonate Dialysis Soln w/ out KCl 5,007.5 ml  @ 

1,000 mls/ hr Q5H1M IV  Last administered on 4/1/20at 18:14;  Start 3/29/20 at 

20:00;  Stop 4/2/20 at 13:08;  Status DC


Potassium Chloride 15 meq/ Bicarbonate Dialysis Soln w/ out KCl 5,007.5 ml  @ 

1,000 mls/ hr Q5H1M IV  Last administered on 4/1/20at 18:14;  Start 3/29/20 at 

20:00;  Stop 4/2/20 at 13:08;  Status DC


Iohexol (Omnipaque 240 Mg/ml) 30 ml 1X  ONCE PO  Last administered on 3/30/20at 

11:30;  Start 3/30/20 at 11:30;  Stop 3/30/20 at 11:33;  Status DC


Info (CONTRAST GIVEN -- Rx MONITORING) 1 each PRN DAILY  PRN MC SEE COMMENTS;  

Start 3/30/20 at 11:45;  Stop 4/1/20 at 11:44;  Status DC


Sodium Chloride 90 meq/Potassium Chloride 15 meq/ Potassium Phosphate 18 mmol/ 

Magnesium Sulfate 8 meq/Calcium Gluconate 15 meq/ Multivitamins 10 ml/Chromium/ 

Copper/Manganese/ Seleni/Zn 0.5 ml/ Insulin Human Regular 15 unit/ Total 

Parenteral Nutrition/Amino Acids/Dextrose/ Fat Emulsion Intravenous 1,400 ml @  

58.333 mls/ hr TPN  CONT IV  Last administered on 3/30/20at 21:47;  Start 

3/30/20 at 22:00;  Stop 3/31/20 at 21:59;  Status DC


Sodium Chloride 90 meq/Potassium Chloride 15 meq/ Potassium Phosphate 18 mmol/ 

Magnesium Sulfate 8 meq/Calcium Gluconate 15 meq/ Multivitamins 10 ml/Chromium/ 

Copper/Manganese/ Seleni/Zn 0.5 ml/ Insulin Human Regular 20 unit/ Total 

Parenteral Nutrition/Amino Acids/Dextrose/ Fat Emulsion Intravenous 1,400 ml @  

58.333 mls/ hr TPN  CONT IV  Last administered on 3/31/20at 21:36;  Start 

3/31/20 at 22:00;  Stop 4/1/20 at 21:59;  Status DC


Alteplase, Recombinant (Cathflo For Central Catheter Clearance) 1 mg 1X  ONCE 

INT CAT  Last administered on 3/31/20at 20:03;  Start 3/31/20 at 19:30;  Stop 

3/31/20 at 19:46;  Status DC


Alteplase, Recombinant (Cathflo For Central Catheter Clearance) 1 mg 1X  ONCE 

INT CAT  Last administered on 3/31/20at 22:05;  Start 3/31/20 at 22:00;  Stop 

3/31/20 at 22:01;  Status DC


Sodium Chloride 90 meq/Potassium Chloride 15 meq/ Potassium Phosphate 18 mmol/ 

Magnesium Sulfate 8 meq/Calcium Gluconate 15 meq/ Multivitamins 10 ml/Chromium/ 

Copper/Manganese/ Seleni/Zn 0.5 ml/ Insulin Human Regular 20 unit/ Total 

Parenteral Nutrition/Amino Acids/Dextrose/ Fat Emulsion Intravenous 1,400 ml @  

58.333 mls/ hr TPN  CONT IV  Last administered on 4/1/20at 21:30;  Start 4/1/20 

at 22:00;  Stop 4/2/20 at 21:59;  Status DC


Dexmedetomidine HCl 400 mcg/ Sodium Chloride 100 ml @ 0 mls/hr CONT  PRN IV 

ANXIETY / AGITATION Last administered on 5/30/20at 12:57;  Start 4/2/20 at 

08:15;  Stop 5/30/20 at 18:31;  Status DC


Sodium Chloride 500 ml @  500 mls/hr 1X PRN  PRN IV ELEVATED BP, SEE COMMENTS;  

Start 4/2/20 at 08:15


Atropine Sulfate (ATROPINE 0.5mg SYRINGE) 0.5 mg PRN Q5MIN  PRN IV SEE COMMENTS;

 Start 4/2/20 at 08:15


Furosemide (Lasix) 20 mg 1X  ONCE IVP  Last administered on 4/2/20at 08:19;  

Start 4/2/20 at 08:15;  Stop 4/2/20 at 08:16;  Status DC


Lidocaine HCl (Buffered Lidocaine 1%) 3 ml STK-MED ONCE .ROUTE ;  Start 4/2/20 

at 08:39;  Stop 4/2/20 at 08:39;  Status DC


Lidocaine HCl (Buffered Lidocaine 1%) 6 ml 1X  ONCE INJ  Last administered on 

4/2/20at 09:05;  Start 4/2/20 at 09:00;  Stop 4/2/20 at 09:06;  Status DC


Sodium Chloride 90 meq/Potassium Chloride 15 meq/ Potassium Phosphate 18 mmol/ 

Magnesium Sulfate 8 meq/Calcium Gluconate 15 meq/ Multivitamins 10 ml/Chromium/ 

Copper/Manganese/ Seleni/Zn 0.5 ml/ Insulin Human Regular 20 unit/ Total 

Parenteral Nutrition/Amino Acids/Dextrose/ Fat Emulsion Intravenous 1,400 ml @  

58.333 mls/ hr TPN  CONT IV  Last administered on 4/2/20at 22:45;  Start 4/2/20 

at 22:00;  Stop 4/3/20 at 21:59;  Status DC


Sodium Chloride 1,000 ml @  1,000 mls/hr Q1H PRN IV hypotension;  Start 4/3/20 

at 07:30;  Stop 4/3/20 at 13:29;  Status DC


Albumin Human 200 ml @  200 mls/hr 1X PRN  PRN IV Hypotension Last administered 

on 4/3/20at 09:36;  Start 4/3/20 at 07:30;  Stop 4/3/20 at 13:29;  Status DC


Sodium Chloride (Normal Saline Flush) 10 ml 1X PRN  PRN IV AP catheter pack;  

Start 4/3/20 at 07:30;  Stop 4/3/20 at 21:29;  Status DC


Sodium Chloride (Normal Saline Flush) 10 ml 1X PRN  PRN IV  catheter pack;  

Start 4/3/20 at 07:30;  Stop 4/4/20 at 07:29;  Status DC


Sodium Chloride 1,000 ml @  400 mls/hr Q2H30M PRN IV PATENCY;  Start 4/3/20 at 

07:30;  Stop 4/3/20 at 19:29;  Status DC


Info (PHARMACY MONITORING -- do not chart) 1 each PRN DAILY  PRN MC SEE 

COMMENTS;  Start 4/3/20 at 07:30;  Stop 4/3/20 at 13:02;  Status DC


Info (PHARMACY MONITORING -- do not chart) 1 each PRN DAILY  PRN MC SEE 

COMMENTS;  Start 4/3/20 at 07:30;  Stop 4/5/20 at 12:45;  Status DC


Sodium Chloride 90 meq/Potassium Chloride 15 meq/ Potassium Phosphate 10 mmol/ 

Magnesium Sulfate 8 meq/Calcium Gluconate 15 meq/ Multivitamins 10 ml/Chromium/ 

Copper/Manganese/ Seleni/Zn 0.5 ml/ Insulin Human Regular 25 unit/ Total 

Parenteral Nutrition/Amino Acids/Dextrose/ Fat Emulsion Intravenous 1,400 ml @  

58.333 mls/ hr TPN  CONT IV  Last administered on 4/3/20at 22:19;  Start 4/3/20 

at 22:00;  Stop 4/4/20 at 21:59;  Status DC


Heparin Sodium (Porcine) (Heparin Sodium) 5,000 unit Q12HR SQ  Last administered

on 4/26/20at 08:59;  Start 4/3/20 at 21:00;  Stop 4/26/20 at 10:05;  Status DC


Ondansetron HCl (Zofran) 4 mg PRN Q6HRS  PRN IV NAUSEA/VOMITING;  Start 4/6/20 

at 07:00;  Stop 4/7/20 at 06:59;  Status DC


Fentanyl Citrate (Fentanyl 2ml Vial) 25 mcg PRN Q5MIN  PRN IV MILD PAIN 1-3;  

Start 4/6/20 at 07:00;  Stop 4/7/20 at 06:59;  Status DC


Fentanyl Citrate (Fentanyl 2ml Vial) 50 mcg PRN Q5MIN  PRN IV MODERATE TO SEVERE

PAIN;  Start 4/6/20 at 07:00;  Stop 4/7/20 at 06:59;  Status DC


Ringer's Solution 1,000 ml @  30 mls/hr Q24H IV ;  Start 4/6/20 at 07:00;  Stop 

4/6/20 at 18:59;  Status DC


Lidocaine HCl (Xylocaine-Mpf 1% 2ml Vial) 2 ml PRN 1X  PRN ID PRIOR TO IV START;

 Start 4/6/20 at 07:00;  Stop 4/7/20 at 06:59;  Status DC


Prochlorperazine Edisylate (Compazine) 5 mg PACU PRN  PRN IV NAUSEA, MRX1;  

Start 4/6/20 at 07:00;  Stop 4/7/20 at 06:59;  Status DC


Sodium Chloride 1,000 ml @  1,000 mls/hr Q1H PRN IV hypotension;  Start 4/4/20 

at 09:10;  Stop 4/4/20 at 15:09;  Status DC


Albumin Human 200 ml @  200 mls/hr 1X PRN  PRN IV Hypotension Last administered 

on 4/4/20at 10:10;  Start 4/4/20 at 09:15;  Stop 4/4/20 at 15:14;  Status DC


Sodium Chloride 1,000 ml @  400 mls/hr Q2H30M PRN IV PATENCY;  Start 4/4/20 at 

09:10;  Stop 4/4/20 at 21:09;  Status DC


Info (PHARMACY MONITORING -- do not chart) 1 each PRN DAILY  PRN MC SEE 

COMMENTS;  Start 4/4/20 at 09:15;  Stop 4/5/20 at 12:45;  Status DC


Info (PHARMACY MONITORING -- do not chart) 1 each PRN DAILY  PRN MC SEE 

COMMENTS;  Start 4/4/20 at 09:15;  Stop 4/5/20 at 12:45;  Status DC


Sodium Chloride 90 meq/Potassium Chloride 15 meq/ Potassium Phosphate 10 mmol/ 

Magnesium Sulfate 8 meq/Calcium Gluconate 15 meq/ Multivitamins 10 ml/Chromium/ 

Copper/Manganese/ Seleni/Zn 0.5 ml/ Insulin Human Regular 25 unit/ Total 

Parenteral Nutrition/Amino Acids/Dextrose/ Fat Emulsion Intravenous 1,400 ml @  

58.333 mls/ hr TPN  CONT IV  Last administered on 4/4/20at 22:10;  Start 4/4/20 

at 22:00;  Stop 4/5/20 at 21:59;  Status DC


Magnesium Sulfate 50 ml @ 25 mls/hr PRN DAILY  PRN IV for Mag < 1.7 on am labs 

Last administered on 6/18/20at 10:57;  Start 4/5/20 at 09:15


Sodium Chloride 90 meq/Potassium Chloride 15 meq/ Potassium Phosphate 10 mmol/ 

Magnesium Sulfate 8 meq/Calcium Gluconate 15 meq/ Multivitamins 10 ml/Chromium/ 

Copper/Manganese/ Seleni/Zn 0.5 ml/ Insulin Human Regular 25 unit/ Total 

Parenteral Nutrition/Amino Acids/Dextrose/ Fat Emulsion Intravenous 1,400 ml @  

58.333 mls/ hr TPN  CONT IV  Last administered on 4/5/20at 21:20;  Start 4/5/20 

at 22:00;  Stop 4/6/20 at 21:59;  Status DC


Sodium Chloride 1,000 ml @  1,000 mls/hr Q1H PRN IV hypotension;  Start 4/5/20 

at 12:23;  Stop 4/5/20 at 18:22;  Status DC


Albumin Human 200 ml @  200 mls/hr 1X  ONCE IV  Last administered on 4/5/20at 

13:34;  Start 4/5/20 at 12:30;  Stop 4/5/20 at 13:29;  Status DC


Diphenhydramine HCl (Benadryl) 25 mg 1X PRN  PRN IV ITCHING;  Start 4/5/20 at 

12:30;  Stop 4/6/20 at 12:29;  Status DC


Diphenhydramine HCl (Benadryl) 25 mg 1X PRN  PRN IV ITCHING;  Start 4/5/20 at 

12:30;  Stop 4/6/20 at 12:29;  Status DC


Info (PHARMACY MONITORING -- do not chart) 1 each PRN DAILY  PRN MC SEE 

COMMENTS;  Start 4/5/20 at 12:30;  Status Cancel


Bupivacaine HCl/ Epinephrine Bitart (Sensorcain-Epi 0.5%-1:310435 Mpf) 30 ml 

STK-MED ONCE .ROUTE  Last administered on 4/6/20at 11:44;  Start 4/6/20 at 

11:00;  Stop 4/6/20 at 11:01;  Status DC


Cellulose (Surgicel Fibrillar 1x2) 1 each STK-MED ONCE .ROUTE ;  Start 4/6/20 at

11:00;  Stop 4/6/20 at 11:01;  Status DC


Sodium Chloride 90 meq/Potassium Chloride 15 meq/ Potassium Phosphate 10 mmol/ 

Magnesium Sulfate 12 meq/Calcium Gluconate 15 meq/ Multivitamins 10 ml/Chromium/

Copper/Manganese/ Seleni/Zn 0.5 ml/ Insulin Human Regular 25 unit/ Total 

Parenteral Nutrition/Amino Acids/Dextrose/ Fat Emulsion Intravenous 1,400 ml @  

58.333 mls/ hr TPN  CONT IV  Last administered on 4/6/20at 22:24;  Start 4/6/20 

at 22:00;  Stop 4/7/20 at 21:59;  Status DC


Propofol 20 ml @ As Directed STK-MED ONCE IV ;  Start 4/6/20 at 11:07;  Stop 

4/6/20 at 11:07;  Status DC


Cellulose (Surgicel Hemostat 4x8) 1 each STK-MED ONCE .ROUTE  Last administered 

on 4/6/20at 11:44;  Start 4/6/20 at 11:55;  Stop 4/6/20 at 11:56;  Status DC


Sevoflurane (Ultane) 60 ml STK-MED ONCE IH ;  Start 4/6/20 at 12:46;  Stop 

4/6/20 at 12:46;  Status DC


Sodium Chloride 1,000 ml @  1,000 mls/hr Q1H PRN IV hypotension;  Start 4/6/20 a

t 13:51;  Stop 4/6/20 at 19:50;  Status DC


Albumin Human 200 ml @  200 mls/hr 1X PRN  PRN IV Hypotension Last administered 

on 4/6/20at 14:51;  Start 4/6/20 at 14:00;  Stop 4/6/20 at 19:59;  Status DC


Diphenhydramine HCl (Benadryl) 25 mg 1X PRN  PRN IV ITCHING;  Start 4/6/20 at 

14:00;  Stop 4/7/20 at 13:59;  Status DC


Diphenhydramine HCl (Benadryl) 25 mg 1X PRN  PRN IV ITCHING;  Start 4/6/20 at 

14:00;  Stop 4/7/20 at 13:59;  Status DC


Sodium Chloride 1,000 ml @  400 mls/hr Q2H30M PRN IV PATENCY;  Start 4/6/20 at 

13:51;  Stop 4/7/20 at 01:50;  Status DC


Info (PHARMACY MONITORING -- do not chart) 1 each PRN DAILY  PRN MC SEE 

COMMENTS;  Start 4/6/20 at 14:00;  Stop 4/9/20 at 08:16;  Status DC


Heparin Sodium (Porcine) (Hep Lock Adult) 500 unit STK-MED ONCE IVP ;  Start 

4/7/20 at 09:29;  Stop 4/7/20 at 09:30;  Status DC


Sodium Chloride 1,000 ml @  1,000 mls/hr Q1H PRN IV hypotension;  Start 4/7/20 

at 10:43;  Stop 4/7/20 at 16:42;  Status DC


Sodium Chloride 1,000 ml @  400 mls/hr Q2H30M PRN IV PATENCY;  Start 4/7/20 at 

10:43;  Stop 4/7/20 at 22:42;  Status DC


Info (PHARMACY MONITORING -- do not chart) 1 each PRN DAILY  PRN MC SEE 

COMMENTS;  Start 4/7/20 at 10:45;  Status UNV


Info (PHARMACY MONITORING -- do not chart) 1 each PRN DAILY  PRN MC SEE 

COMMENTS;  Start 4/7/20 at 10:45;  Status UNV


Sodium Chloride 90 meq/Potassium Chloride 15 meq/ Magnesium Sulfate 12 

meq/Calcium Gluconate 15 meq/ Multivitamins 10 ml/Chromium/ Copper/Manganese/ 

Seleni/Zn 0.5 ml/ Insulin Human Regular 25 unit/ Total Parenteral 

Nutrition/Amino Acids/Dextrose/ Fat Emulsion Intravenous 1,400 ml @  58.333 mls/

hr TPN  CONT IV  Last administered on 4/7/20at 22:13;  Start 4/7/20 at 22:00;  

Stop 4/8/20 at 21:59;  Status DC


Sodium Chloride 1,000 ml @  1,000 mls/hr Q1H PRN IV hypotension;  Start 4/8/20 

at 07:50;  Stop 4/8/20 at 13:49;  Status DC


Albumin Human 200 ml @  200 mls/hr 1X  ONCE IV ;  Start 4/8/20 at 08:00;  Stop 

4/8/20 at 08:53;  Status DC


Diphenhydramine HCl (Benadryl) 25 mg 1X PRN  PRN IV ITCHING;  Start 4/8/20 at 

08:00;  Stop 4/9/20 at 07:59;  Status DC


Diphenhydramine HCl (Benadryl) 25 mg 1X PRN  PRN IV ITCHING;  Start 4/8/20 at 

08:00;  Stop 4/9/20 at 07:59;  Status DC


Info (PHARMACY MONITORING -- do not chart) 1 each PRN DAILY  PRN MC SEE 

COMMENTS;  Start 4/8/20 at 08:00;  Stop 4/9/20 at 08:16;  Status DC


Albumin Human 50 ml @ 50 mls/hr 1X  ONCE IV ;  Start 4/8/20 at 08:53;  Stop 

4/8/20 at 08:56;  Status DC


Albumin Human 200 ml @  50 mls/hr PRN 1X  PRN IV HYPOTENSION Last administered 

on 4/14/20at 11:54;  Start 4/8/20 at 09:00;  Stop 5/21/20 at 11:14;  Status DC


Meropenem 500 mg/ Sodium Chloride 50 ml @  100 mls/hr Q12H IV  Last administered

on 4/28/20at 10:45;  Start 4/8/20 at 10:00;  Stop 4/28/20 at 12:37;  Status DC


Sodium Chloride 90 meq/Magnesium Sulfate 12 meq/ Calcium Gluconate 15 meq/ 

Multivitamins 10 ml/Chromium/ Copper/Manganese/ Seleni/Zn 0.5 ml/ Insulin Human 

Regular 25 unit/ Total Parenteral Nutrition/Amino Acids/Dextrose/ Fat Emulsion 

Intravenous 1,400 ml @  58.333 mls/ hr TPN  CONT IV  Last administered on 

4/8/20at 21:41;  Start 4/8/20 at 22:00;  Stop 4/9/20 at 21:59;  Status DC


Sodium Chloride 1,000 ml @  1,000 mls/hr Q1H PRN IV hypotension;  Start 4/9/20 

at 07:58;  Stop 4/9/20 at 13:57;  Status DC


Albumin Human 200 ml @  200 mls/hr 1X PRN  PRN IV Hypotension Last administered 

on 4/9/20at 09:30;  Start 4/9/20 at 08:00;  Stop 4/9/20 at 13:59;  Status DC


Sodium Chloride 1,000 ml @  400 mls/hr Q2H30M PRN IV PATENCY;  Start 4/9/20 at 

07:58;  Stop 4/9/20 at 19:57;  Status DC


Info (PHARMACY MONITORING -- do not chart) 1 each PRN DAILY  PRN MC SEE 

COMMENTS;  Start 4/9/20 at 08:00;  Status Cancel


Info (PHARMACY MONITORING -- do not chart) 1 each PRN DAILY  PRN MC SEE 

COMMENTS;  Start 4/9/20 at 08:15;  Status UNV


Sodium Chloride 90 meq/Potassium Phosphate 5 mmol/ Magnesium Sulfate 12 meq/

Calcium Gluconate 15 meq/ Multivitamins 10 ml/Chromium/ Copper/Manganese/ 

Seleni/Zn 0.5 ml/ Insulin Human Regular 30 unit/ Total Parenteral 

Nutrition/Amino Acids/Dextrose/ Fat Emulsion Intravenous 1,400 ml @  58.333 mls/

hr TPN  CONT IV  Last administered on 4/9/20at 22:08;  Start 4/9/20 at 22:00;  

Stop 4/10/20 at 21:59;  Status DC


Linezolid/Dextrose 300 ml @  300 mls/hr Q12HR IV  Last administered on 4/20/20at

20:40;  Start 4/10/20 at 11:00;  Stop 4/21/20 at 08:10;  Status DC


Sodium Chloride 90 meq/Potassium Phosphate 15 mmol/ Magnesium Sulfate 12 

meq/Calcium Gluconate 15 meq/ Multivitamins 10 ml/Chromium/ Copper/Manganese/ 

Seleni/Zn 0.5 ml/ Insulin Human Regular 30 unit/ Total Parenteral 

Nutrition/Amino Acids/Dextrose/ Fat Emulsion Intravenous 1,400 ml @  58.333 mls/

hr TPN  CONT IV  Last administered on 4/10/20at 21:49;  Start 4/10/20 at 22:00; 

Stop 4/11/20 at 21:59;  Status DC


Sodium Chloride 90 meq/Potassium Phosphate 15 mmol/ Magnesium Sulfate 12 

meq/Calcium Gluconate 15 meq/ Multivitamins 10 ml/Chromium/ Copper/Manganese/ 

Seleni/Zn 0.5 ml/ Insulin Human Regular 40 unit/ Total Parenteral 

Nutrition/Amino Acids/Dextrose/ Fat Emulsion Intravenous 1,400 ml @  58.333 mls/

hr TPN  CONT IV  Last administered on 4/11/20at 21:21;  Start 4/11/20 at 22:00; 

Stop 4/12/20 at 21:59;  Status DC


Sodium Chloride 1,000 ml @  1,000 mls/hr Q1H PRN IV hypotension;  Start 4/11/20 

at 13:26;  Stop 4/11/20 at 19:25;  Status DC


Albumin Human 200 ml @  200 mls/hr 1X PRN  PRN IV Hypotension Last administered 

on 4/11/20at 15:00;  Start 4/11/20 at 13:30;  Stop 4/11/20 at 19:29;  Status DC


Sodium Chloride (Normal Saline Flush) 10 ml 1X PRN  PRN IV AP catheter pack;  

Start 4/11/20 at 13:30;  Stop 4/12/20 at 13:29;  Status DC


Sodium Chloride (Normal Saline Flush) 10 ml 1X PRN  PRN IV  catheter pack;  

Start 4/11/20 at 13:30;  Stop 4/12/20 at 13:29;  Status DC


Sodium Chloride 1,000 ml @  400 mls/hr Q2H30M PRN IV PATENCY;  Start 4/11/20 at 

13:26;  Stop 4/12/20 at 01:25;  Status DC


Info (PHARMACY MONITORING -- do not chart) 1 each PRN DAILY  PRN MC SEE 

COMMENTS;  Start 4/11/20 at 13:30;  Stop 4/11/20 at 13:33;  Status DC


Info (PHARMACY MONITORING -- do not chart) 1 each PRN DAILY  PRN MC SEE 

COMMENTS;  Start 4/11/20 at 13:30;  Stop 4/11/20 at 13:34;  Status DC


Sodium Chloride 90 meq/Potassium Phosphate 19 mmol/ Magnesium Sulfate 12 

meq/Calcium Gluconate 15 meq/ Multivitamins 10 ml/Chromium/ Copper/Manganese/ 

Seleni/Zn 0.5 ml/ Insulin Human Regular 40 unit/ Total Parenteral 

Nutrition/Amino Acids/Dextrose/ Fat Emulsion Intravenous 1,400 ml @  58.333 mls/

hr TPN  CONT IV  Last administered on 4/12/20at 21:54;  Start 4/12/20 at 22:00; 

Stop 4/13/20 at 21:59;  Status DC


Sodium Chloride 1,000 ml @  1,000 mls/hr Q1H PRN IV hypotension;  Start 4/13/20 

at 09:35;  Stop 4/13/20 at 15:34;  Status DC


Albumin Human 200 ml @  200 mls/hr 1X PRN  PRN IV Hypotension;  Start 4/13/20 at

09:45;  Stop 4/13/20 at 15:44;  Status DC


Diphenhydramine HCl (Benadryl) 25 mg 1X PRN  PRN IV ITCHING;  Start 4/13/20 at 

09:45;  Stop 4/14/20 at 09:44;  Status DC


Diphenhydramine HCl (Benadryl) 25 mg 1X PRN  PRN IV ITCHING;  Start 4/13/20 at 

09:45;  Stop 4/14/20 at 09:44;  Status DC


Sodium Chloride 1,000 ml @  400 mls/hr Q2H30M PRN IV PATENCY;  Start 4/13/20 at 

09:35;  Stop 4/13/20 at 21:34;  Status DC


Info (PHARMACY MONITORING -- do not chart) 1 each PRN DAILY  PRN MC SEE 

COMMENTS;  Start 4/13/20 at 09:45;  Status Cancel


Sodium Chloride 100 meq/Potassium Phosphate 19 mmol/ Magnesium Sulfate 12 

meq/Calcium Gluconate 15 meq/ Multivitamins 10 ml/Chromium/ Copper/Manganese/ 

Seleni/Zn 0.5 ml/ Insulin Human Regular 40 unit/ Potassium Chloride 20 meq/ 

Total Parenteral Nutrition/Amino Acids/Dextrose/ Fat Emulsion Intravenous 1,400 

ml @  58.333 mls/ hr TPN  CONT IV  Last administered on 4/13/20at 22:02;  Start 

4/13/20 at 22:00;  Stop 4/14/20 at 21:59;  Status DC


Furosemide (Lasix) 40 mg 1X  ONCE IVP  Last administered on 4/13/20at 14:39;  

Start 4/13/20 at 14:30;  Stop 4/13/20 at 14:31;  Status DC


Metronidazole 100 ml @  100 mls/hr Q8HRS IV  Last administered on 4/21/20at 

06:04;  Start 4/14/20 at 10:00;  Stop 4/21/20 at 08:10;  Status DC


Sodium Chloride 1,000 ml @  1,000 mls/hr Q1H PRN IV hypotension;  Start 4/14/20 

at 08:00;  Stop 4/14/20 at 13:59;  Status DC


Albumin Human 200 ml @  200 mls/hr 1X PRN  PRN IV Hypotension;  Start 4/14/20 at

08:00;  Stop 4/14/20 at 13:59;  Status DC


Sodium Chloride 1,000 ml @  400 mls/hr Q2H30M PRN IV PATENCY;  Start 4/14/20 at 

08:00;  Stop 4/14/20 at 19:59;  Status DC


Info (PHARMACY MONITORING -- do not chart) 1 each PRN DAILY  PRN MC SEE 

COMMENTS;  Start 4/14/20 at 11:30;  Status UNV


Info (PHARMACY MONITORING -- do not chart) 1 each PRN DAILY  PRN MC SEE 

COMMENTS;  Start 4/14/20 at 11:30;  Stop 4/16/20 at 12:13;  Status DC


Sodium Chloride 100 meq/Potassium Phosphate 19 mmol/ Magnesium Sulfate 12 

meq/Calcium Gluconate 15 meq/ Multivitamins 10 ml/Chromium/ Copper/Manganese/ 

Seleni/Zn 0.5 ml/ Insulin Human Regular 40 unit/ Potassium Chloride 20 meq/ 

Total Parenteral Nutrition/Amino Acids/Dextrose/ Fat Emulsion Intravenous 1,400 

ml @  58.333 mls/ hr TPN  CONT IV  Last administered on 4/14/20at 21:52;  Start 

4/14/20 at 22:00;  Stop 4/15/20 at 21:59;  Status DC


Sodium Chloride (Normal Saline Flush) 10 ml QSHIFT  PRN IV AFTER MEDS AND BLOOD 

DRAWS;  Start 4/14/20 at 15:00;  Stop 5/12/20 at 11:27;  Status DC


Sodium Chloride (Normal Saline Flush) 10 ml PRN Q5MIN  PRN IV AFTER MEDS AND 

BLOOD DRAWS;  Start 4/14/20 at 15:00


Sodium Chloride (Normal Saline Flush) 20 ml PRN Q5MIN  PRN IV AFTER MEDS AND 

BLOOD DRAWS;  Start 4/14/20 at 15:00


Sodium Chloride 100 meq/Potassium Phosphate 19 mmol/ Magnesium Sulfate 12 

meq/Calcium Gluconate 15 meq/ Multivitamins 10 ml/Chromium/ Copper/Manganese/ 

Seleni/Zn 0.5 ml/ Insulin Human Regular 40 unit/ Potassium Chloride 20 meq/ 

Total Parenteral Nutrition/Amino Acids/Dextrose/ Fat Emulsion Intravenous 1,400 

ml @  58.333 mls/ hr TPN  CONT IV  Last administered on 4/15/20at 21:20;  Start 

4/15/20 at 22:00;  Stop 4/16/20 at 21:59;  Status DC


Lidocaine HCl (Buffered Lidocaine 1%) 3 ml STK-MED ONCE .ROUTE ;  Start 4/15/20 

at 13:16;  Stop 4/15/20 at 13:16;  Status DC


Lidocaine HCl (Buffered Lidocaine 1%) 6 ml 1X  ONCE INJ  Last administered on 

4/15/20at 13:45;  Start 4/15/20 at 13:30;  Stop 4/15/20 at 13:31;  Status DC


Albumin Human 100 ml @  100 mls/hr 1X  ONCE IV  Last administered on 4/15/20at 

15:41;  Start 4/15/20 at 15:00;  Stop 4/15/20 at 15:59;  Status DC


Albumin Human 50 ml @ 50 mls/hr 1X  ONCE IV  Last administered on 4/15/20at 

15:00;  Start 4/15/20 at 15:00;  Stop 4/15/20 at 15:59;  Status DC


Info (PHARMACY MONITORING -- do not chart) 1 each PRN DAILY  PRN MC SEE 

COMMENTS;  Start 4/16/20 at 11:30;  Status Cancel


Info (PHARMACY MONITORING -- do not chart) 1 each PRN DAILY  PRN MC SEE 

COMMENTS;  Start 4/16/20 at 11:30;  Status UNV


Sodium Chloride 100 meq/Potassium Phosphate 10 mmol/ Magnesium Sulfate 12 

meq/Calcium Gluconate 15 meq/ Multivitamins 10 ml/Chromium/ Copper/Manganese/ 

Seleni/Zn 0.5 ml/ Insulin Human Regular 35 unit/ Potassium Chloride 20 meq/ 

Total Parenteral Nutrition/Amino Acids/Dextrose/ Fat Emulsion Intravenous 1,400 

ml @  58.333 mls/ hr TPN  CONT IV  Last administered on 4/16/20at 22:10;  Start 

4/16/20 at 22:00;  Stop 4/17/20 at 21:59;  Status DC


Sodium Chloride 100 meq/Potassium Phosphate 5 mmol/ Magnesium Sulfate 12 

meq/Calcium Gluconate 15 meq/ Multivitamins 10 ml/Chromium/ Copper/Manganese/ 

Seleni/Zn 0.5 ml/ Insulin Human Regular 35 unit/ Potassium Chloride 20 meq/ 

Total Parenteral Nutrition/Amino Acids/Dextrose/ Fat Emulsion Intravenous 1,400 

ml @  58.333 mls/ hr TPN  CONT IV  Last administered on 4/17/20at 22:59;  Start 

4/17/20 at 22:00;  Stop 4/18/20 at 21:59;  Status DC


Sodium Chloride 1,000 ml @  1,000 mls/hr Q1H PRN IV hypotension;  Start 4/18/20 

at 08:27;  Stop 4/18/20 at 14:26;  Status DC


Albumin Human 200 ml @  200 mls/hr 1X PRN  PRN IV Hypotension Last administered 

on 4/18/20at 09:18;  Start 4/18/20 at 08:30;  Stop 4/18/20 at 14:29;  Status DC


Sodium Chloride 1,000 ml @  400 mls/hr Q2H30M PRN IV PATENCY;  Start 4/18/20 at 

08:27;  Stop 4/18/20 at 20:26;  Status DC


Info (PHARMACY MONITORING -- do not chart) 1 each PRN DAILY  PRN MC SEE 

COMMENTS;  Start 4/18/20 at 08:30;  Status Cancel


Info (PHARMACY MONITORING -- do not chart) 1 each PRN DAILY  PRN MC SEE 

COMMENTS;  Start 4/18/20 at 08:30;  Stop 4/26/20 at 13:10;  Status DC


Sodium Chloride 100 meq/Potassium Chloride 40 meq/ Magnesium Sulfate 15 

meq/Calcium Gluconate 15 meq/ Multivitamins 10 ml/Chromium/ Copper/Manganese/ 

Seleni/Zn 0.5 ml/ Insulin Human Regular 35 unit/ Total Parenteral 

Nutrition/Amino Acids/Dextrose/ Fat Emulsion Intravenous 1,400 ml @  58.333 mls/

hr TPN  CONT IV  Last administered on 4/18/20at 22:00;  Start 4/18/20 at 22:00; 

Stop 4/19/20 at 21:59;  Status DC


Potassium Chloride/Water 100 ml @  100 mls/hr 1X  ONCE IV  Last administered on 

4/18/20at 17:28;  Start 4/18/20 at 14:45;  Stop 4/18/20 at 15:44;  Status DC


Sodium Chloride 100 meq/Potassium Chloride 40 meq/ Magnesium Sulfate 15 

meq/Calcium Gluconate 15 meq/ Multivitamins 10 ml/Chromium/ Copper/Manganese/ 

Seleni/Zn 0.5 ml/ Insulin Human Regular 35 unit/ Total Parenteral 

Nutrition/Amino Acids/Dextrose/ Fat Emulsion Intravenous 1,400 ml @  58.333 mls/

hr TPN  CONT IV  Last administered on 4/19/20at 22:46;  Start 4/19/20 at 22:00; 

Stop 4/20/20 at 21:59;  Status DC


Sodium Chloride 100 meq/Potassium Chloride 40 meq/ Magnesium Sulfate 20 

meq/Calcium Gluconate 15 meq/ Multivitamins 10 ml/Chromium/ Copper/Manganese/ 

Seleni/Zn 0.5 ml/ Insulin Human Regular 35 unit/ Total Parenteral Nutrition/Am

yas Acids/Dextrose/ Fat Emulsion Intravenous 1,400 ml @  58.333 mls/ hr TPN  

CONT IV  Last administered on 4/20/20at 22:31;  Start 4/20/20 at 22:00;  Stop 

4/21/20 at 21:59;  Status DC


Fentanyl Citrate (Fentanyl 2ml Vial) 50 mcg PRN Q2HR  PRN IVP PAIN Last 

administered on 4/27/20at 13:32;  Start 4/20/20 at 21:00;  Stop 4/28/20 at 

12:53;  Status DC


Fentanyl Citrate (Fentanyl 2ml Vial) 25 mcg PRN Q2HR  PRN IVP PAIN;  Start 

4/20/20 at 21:00;  Stop 4/28/20 at 12:54;  Status DC


Enoxaparin Sodium (Lovenox 100mg Syringe) 100 mg Q12HR SQ ;  Start 4/21/20 at 

21:00;  Status UNV


Amino Acids/ Glycerin/ Electrolytes 1,000 ml @  75 mls/hr [id number] IV ;  Start 

4/20/20 at 21:15;  Status UNV


Sodium Chloride 1,000 ml @  1,000 mls/hr Q1H PRN IV hypotension;  Start 4/21/20 

at 07:56;  Stop 4/21/20 at 13:55;  Status DC


Albumin Human 200 ml @  200 mls/hr 1X PRN  PRN IV Hypotension Last administered 

on 4/21/20at 08:40;  Start 4/21/20 at 08:00;  Stop 4/21/20 at 13:59;  Status DC


Sodium Chloride 1,000 ml @  400 mls/hr Q2H30M PRN IV PATENCY;  Start 4/21/20 at 

07:56;  Stop 4/21/20 at 19:55;  Status DC


Info (PHARMACY MONITORING -- do not chart) 1 each PRN DAILY  PRN MC SEE 

COMMENTS;  Start 4/21/20 at 08:00;  Status UNV


Info (PHARMACY MONITORING -- do not chart) 1 each PRN DAILY  PRN MC SEE 

COMMENTS;  Start 4/21/20 at 08:00;  Status UNV


Daptomycin 430 mg/ Sodium Chloride 50 ml @  100 mls/hr Q24H IV  Last 

administered on 4/21/20at 12:35;  Start 4/21/20 at 09:00;  Stop 4/21/20 at 

12:49;  Status DC


Sodium Chloride 100 meq/Potassium Chloride 40 meq/ Magnesium Sulfate 20 

meq/Calcium Gluconate 15 meq/ Multivitamins 10 ml/Chromium/ Copper/Manganese/ 

Seleni/Zn 0.5 ml/ Insulin Human Regular 35 unit/ Total Parenteral 

Nutrition/Amino Acids/Dextrose/ Fat Emulsion Intravenous 1,400 ml @  58.333 mls/

hr TPN  CONT IV  Last administered on 4/21/20at 21:26;  Start 4/21/20 at 22:00; 

Stop 4/22/20 at 21:59;  Status DC


Daptomycin 430 mg/ Sodium Chloride 50 ml @  100 mls/hr Q48H IV ;  Start 4/23/20 

at 09:00;  Stop 4/22/20 at 11:55;  Status DC


Sodium Chloride 100 meq/Potassium Chloride 40 meq/ Magnesium Sulfate 20 

meq/Calcium Gluconate 15 meq/ Multivitamins 10 ml/Chromium/ Copper/Manganese/ 

Seleni/Zn 0.5 ml/ Insulin Human Regular 35 unit/ Total Parenteral 

Nutrition/Amino Acids/Dextrose/ Fat Emulsion Intravenous 1,400 ml @  58.333 mls/

hr TPN  CONT IV  Last administered on 4/22/20at 22:27;  Start 4/22/20 at 22:00; 

Stop 4/23/20 at 21:59;  Status DC


Daptomycin 430 mg/ Sodium Chloride 50 ml @  100 mls/hr Q24H IV  Last 

administered on 4/24/20at 15:07;  Start 4/22/20 at 13:00;  Stop 4/25/20 at 

13:15;  Status DC


Sodium Chloride 100 meq/Potassium Chloride 40 meq/ Magnesium Sulfate 20 

meq/Calcium Gluconate 10 meq/ Multivitamins 10 ml/Chromium/ Copper/Manganese/ 

Seleni/Zn 0.5 ml/ Insulin Human Regular 35 unit/ Total Parenteral 

Nutrition/Amino Acids/Dextrose/ Fat Emulsion Intravenous 1,400 ml @  58.333 mls/

hr TPN  CONT IV  Last administered on 4/24/20at 00:06;  Start 4/23/20 at 22:00; 

Stop 4/24/20 at 21:59;  Status DC


Alteplase, Recombinant (Cathflo For Central Catheter Clearance) 1 mg 1X  ONCE 

INT CAT  Last administered on 4/24/20at 11:44;  Start 4/24/20 at 10:45;  Stop 

4/24/20 at 10:46;  Status DC


Ondansetron HCl (Zofran) 4 mg PRN Q6HRS  PRN IV NAUSEA/VOMITING;  Start 4/27/20 

at 07:00;  Stop 4/28/20 at 06:59;  Status DC


Fentanyl Citrate (Fentanyl 2ml Vial) 25 mcg PRN Q5MIN  PRN IV MILD PAIN 1-3;  

Start 4/27/20 at 07:00;  Stop 4/28/20 at 06:59;  Status DC


Fentanyl Citrate (Fentanyl 2ml Vial) 50 mcg PRN Q5MIN  PRN IV MODERATE TO SEVERE

PAIN Last administered on 4/27/20at 10:17;  Start 4/27/20 at 07:00;  Stop 

4/28/20 at 06:59;  Status DC


Ringer's Solution 1,000 ml @  30 mls/hr Q24H IV ;  Start 4/27/20 at 07:00;  Stop

4/27/20 at 18:59;  Status DC


Lidocaine HCl (Xylocaine-Mpf 1% 2ml Vial) 2 ml PRN 1X  PRN ID PRIOR TO IV START;

 Start 4/27/20 at 07:00;  Stop 4/28/20 at 06:59;  Status DC


Prochlorperazine Edisylate (Compazine) 5 mg PACU PRN  PRN IV NAUSEA, MRX1;  

Start 4/27/20 at 07:00;  Stop 4/28/20 at 06:59;  Status DC


Sodium Acetate 50 meq/Potassium Acetate 55 meq/ Magnesium Sulfate 20 meq/Calcium

Gluconate 10 meq/ Multivitamins 10 ml/Chromium/ Copper/Manganese/ Seleni/Zn 0.5 

ml/ Insulin Human Regular 35 unit/ Total Parenteral Nutrition/Amino 

Acids/Dextrose/ Fat Emulsion Intravenous 1,400 ml @  58.333 mls/ hr TPN  CONT IV

;  Start 4/24/20 at 22:00;  Stop 4/24/20 at 14:15;  Status DC


Sodium Acetate 50 meq/Potassium Acetate 55 meq/ Magnesium Sulfate 20 meq/Calcium

Gluconate 10 meq/ Multivitamins 10 ml/Chromium/ Copper/Manganese/ Seleni/Zn 0.5 

ml/ Insulin Human Regular 35 unit/ Total Parenteral Nutrition/Amino 

Acids/Dextrose/ Fat Emulsion Intravenous 1,800 ml @  75 mls/hr TPN  CONT IV  

Last administered on 4/24/20at 22:38;  Start 4/24/20 at 22:00;  Stop 4/25/20 at 

21:59;  Status DC


Sodium Chloride 1,000 ml @  1,000 mls/hr Q1H PRN IV hypotension;  Start 4/24/20 

at 15:31;  Stop 4/24/20 at 21:30;  Status DC


Diphenhydramine HCl (Benadryl) 25 mg 1X PRN  PRN IV ITCHING;  Start 4/24/20 at 

15:45;  Stop 4/25/20 at 15:44;  Status DC


Diphenhydramine HCl (Benadryl) 25 mg 1X PRN  PRN IV ITCHING;  Start 4/24/20 at 

15:45;  Stop 4/25/20 at 15:44;  Status DC


Sodium Chloride 1,000 ml @  400 mls/hr Q2H30M PRN IV PATENCY;  Start 4/24/20 at 

15:31;  Stop 4/25/20 at 03:30;  Status DC


Info (PHARMACY MONITORING -- do not chart) 1 each PRN DAILY  PRN MC SEE 

COMMENTS;  Start 4/24/20 at 15:45;  Stop 5/26/20 at 14:14;  Status DC


Sodium Acetate 50 meq/Potassium Acetate 55 meq/ Magnesium Sulfate 20 meq/Calcium

Gluconate 10 meq/ Multivitamins 10 ml/Chromium/ Copper/Manganese/ Seleni/Zn 0.5 

ml/ Insulin Human Regular 35 unit/ Total Parenteral Nutrition/Amino 

Acids/Dextrose/ Fat Emulsion Intravenous 1,800 ml @  75 mls/hr TPN  CONT IV  

Last administered on 4/25/20at 22:03;  Start 4/25/20 at 22:00;  Stop 4/26/20 at 

21:59;  Status DC


Daptomycin 430 mg/ Sodium Chloride 50 ml @  100 mls/hr Q24H IV  Last 

administered on 4/30/20at 13:00;  Start 4/25/20 at 13:00;  Stop 4/30/20 at 

20:58;  Status DC


Heparin Sodium (Porcine) 1000 unit/Sodium Chloride 1,001 ml @  1,001 mls/hr 1X  

ONCE IRR ;  Start 4/27/20 at 06:00;  Stop 4/27/20 at 06:59;  Status DC


Potassium Acetate 55 meq/Magnesium Sulfate 20 meq/ Calcium Gluconate 10 meq/ 

Multivitamins 10 ml/Chromium/ Copper/Manganese/ Seleni/Zn 0.5 ml/ Insulin Human 

Regular 35 unit/ Total Parenteral Nutrition/Amino Acids/Dextrose/ Fat Emulsion 

Intravenous 1,920 ml @  80 mls/hr TPN  CONT IV  Last administered on 4/26/20at 

22:10;  Start 4/26/20 at 22:00;  Stop 4/27/20 at 21:59;  Status DC


Dexamethasone Sodium Phosphate (Decadron) 4 mg STK-MED ONCE .ROUTE ;  Start 

4/27/20 at 10:56;  Stop 4/27/20 at 10:57;  Status DC


Ondansetron HCl (Zofran) 4 mg STK-MED ONCE .ROUTE ;  Start 4/27/20 at 10:56;  

Stop 4/27/20 at 10:57;  Status DC


Rocuronium Bromide (Zemuron) 50 mg STK-MED ONCE .ROUTE ;  Start 4/27/20 at 

10:56;  Stop 4/27/20 at 10:57;  Status DC


Fentanyl Citrate (Fentanyl 2ml Vial) 100 mcg STK-MED ONCE .ROUTE ;  Start 

4/27/20 at 10:56;  Stop 4/27/20 at 10:57;  Status DC


Bupivacaine HCl/ Epinephrine Bitart (Sensorcain-Epi 0.5%-1:691031 Mpf) 30 ml 

STK-MED ONCE .ROUTE  Last administered on 4/27/20at 12:01;  Start 4/27/20 at 

10:58;  Stop 4/27/20 at 10:58;  Status DC


Cellulose (Surgicel Hemostat 2x14) 1 each STK-MED ONCE .ROUTE ;  Start 4/27/20 a

t 10:58;  Stop 4/27/20 at 10:59;  Status DC


Iohexol (Omnipaque 300 Mg/ml) 50 ml STK-MED ONCE .ROUTE ;  Start 4/27/20 at 

10:58;  Stop 4/27/20 at 10:59;  Status DC


Cellulose (Surgicel Hemostat 4x8) 1 each STK-MED ONCE .ROUTE ;  Start 4/27/20 at

10:58;  Stop 4/27/20 at 10:59;  Status DC


Bisacodyl (Dulcolax Supp) 10 mg STK-MED ONCE .ROUTE ;  Start 4/27/20 at 10:59;  

Stop 4/27/20 at 10:59;  Status DC


Heparin Sodium (Porcine) 1000 unit/Sodium Chloride 1,001 ml @  1,001 mls/hr 1X  

ONCE IRR ;  Start 4/27/20 at 12:00;  Stop 4/27/20 at 12:59;  Status DC


Propofol 20 ml @ As Directed STK-MED ONCE IV ;  Start 4/27/20 at 11:05;  Stop 

4/27/20 at 11:05;  Status DC


Sevoflurane (Ultane) 90 ml STK-MED ONCE IH ;  Start 4/27/20 at 11:05;  Stop 

4/27/20 at 11:05;  Status DC


Sevoflurane (Ultane) 60 ml STK-MED ONCE IH ;  Start 4/27/20 at 12:26;  Stop 

4/27/20 at 12:27;  Status DC


Propofol 20 ml @ As Directed STK-MED ONCE IV ;  Start 4/27/20 at 12:26;  Stop 

4/27/20 at 12:27;  Status DC


Phenylephrine HCl (PHENYLEPHRINE in 0.9% NACL PF) 1 mg STK-MED ONCE IV ;  Start 

4/27/20 at 12:34;  Stop 4/27/20 at 12:34;  Status DC


Heparin Sodium (Porcine) (Heparin Sodium) 5,000 unit Q12HR SQ  Last administered

on 5/6/20at 20:57;  Start 4/27/20 at 21:00;  Stop 5/7/20 at 09:59;  Status DC


Sodium Chloride (Normal Saline Flush) 3 ml QSHIFT  PRN IV AFTER MEDS AND BLOOD 

DRAWS;  Start 4/27/20 at 13:45


Naloxone HCl (Narcan) 0.4 mg PRN Q2MIN  PRN IV SEE INSTRUCTIONS Last 

administered on 6/6/20at 15:15;  Start 4/27/20 at 13:45


Sodium Chloride 1,000 ml @  25 mls/hr Q24H IV  Last administered on 5/26/20at 

13:37;  Start 4/27/20 at 13:37;  Stop 5/29/20 at 13:09;  Status DC


Naloxone HCl (Narcan) 0.4 mg PRN Q2MIN  PRN IV SEE INSTRUCTIONS;  Start 4/27/20 

at 14:30;  Status UNV


Sodium Chloride 1,000 ml @  25 mls/hr Q24H IV ;  Start 4/27/20 at 14:30;  Status

UNV


Hydromorphone HCl 30 ml @ 0 mls/hr CONT PRN  PRN IV PER PROTOCOL Last 

administered on 5/2/20at 16:08;  Start 4/27/20 at 14:30;  Stop 5/4/20 at 08:55; 

Status DC


Potassium Acetate 55 meq/Magnesium Sulfate 20 meq/ Calcium Gluconate 10 meq/ 

Multivitamins 10 ml/Chromium/ Copper/Manganese/ Seleni/Zn 0.5 ml/ Insulin Human 

Regular 35 unit/ Total Parenteral Nutrition/Amino Acids/Dextrose/ Fat Emulsion 

Intravenous 1,920 ml @  80 mls/hr TPN  CONT IV  Last administered on 4/27/20at 

22:01;  Start 4/27/20 at 22:00;  Stop 4/28/20 at 21:59;  Status DC


Bumetanide (Bumex) 2 mg BID92 IV  Last administered on 5/1/20at 13:50;  Start 

4/28/20 at 14:00;  Stop 5/2/20 at 14:10;  Status DC


Meropenem 1 gm/ Sodium Chloride 100 ml @  200 mls/hr Q8HRS IV  Last administered

on 5/22/20at 05:53;  Start 4/28/20 at 14:00;  Stop 5/22/20 at 09:31;  Status DC


Potassium Acetate 55 meq/Magnesium Sulfate 20 meq/ Calcium Gluconate 10 meq/ 

Multivitamins 10 ml/Chromium/ Copper/Manganese/ Seleni/Zn 0.5 ml/ Insulin Human 

Regular 35 unit/ Total Parenteral Nutrition/Amino Acids/Dextrose/ Fat Emulsion 

Intravenous 1,920 ml @  80 mls/hr TPN  CONT IV  Last administered on 4/28/20at 

22:02;  Start 4/28/20 at 22:00;  Stop 4/29/20 at 21:59;  Status DC


Hydromorphone HCl (Dilaudid Standard PCA) 12 mg STK-MED ONCE IV ;  Start 4/27/20

at 14:35;  Stop 4/28/20 at 13:53;  Status DC


Artificial Tears (Artificial Tears) 1 drop PRN Q15MIN  PRN OU DRY EYE Last 

administered on 6/15/20at 03:38;  Start 4/29/20 at 05:30


Hydromorphone HCl (Dilaudid Standard PCA) 12 mg STK-MED ONCE IV ;  Start 4/28/20

at 12:05;  Stop 4/29/20 at 09:15;  Status DC


Potassium Acetate 65 meq/Magnesium Sulfate 20 meq/ Calcium Gluconate 10 meq/ 

Multivitamins 10 ml/Chromium/ Copper/Manganese/ Seleni/Zn 0.5 ml/ Insulin Human 

Regular 30 unit/ Total Parenteral Nutrition/Amino Acids/Dextrose/ Fat Emulsion 

Intravenous 1,920 ml @  80 mls/hr TPN  CONT IV  Last administered on 4/29/20at 

22:22;  Start 4/29/20 at 22:00;  Stop 4/30/20 at 21:59;  Status DC


Cyclobenzaprine HCl (Flexeril) 10 mg PRN Q6HRS  PRN PO MUSCLE SPASMS;  Start 

4/30/20 at 10:45


Potassium Acetate 55 meq/Magnesium Sulfate 20 meq/ Calcium Gluconate 10 meq/ 

Multivitamins 10 ml/Chromium/ Copper/Manganese/ Seleni/Zn 0.5 ml/ Insulin Human 

Regular 30 unit/ Total Parenteral Nutrition/Amino Acids/Dextrose/ Fat Emulsion 

Intravenous 1,920 ml @  80 mls/hr TPN  CONT IV  Last administered on 5/1/20at 0

1:00;  Start 4/30/20 at 22:00;  Stop 5/1/20 at 21:59;  Status DC


Magnesium Sulfate 50 ml @ 25 mls/hr 1X  ONCE IV  Last administered on 4/30/20at 

17:18;  Start 4/30/20 at 12:45;  Stop 4/30/20 at 14:44;  Status DC


Potassium Chloride/Water 100 ml @  100 mls/hr 1X  ONCE IV  Last administered on 

5/1/20at 11:27;  Start 5/1/20 at 12:00;  Stop 5/1/20 at 12:59;  Status DC


Hydromorphone HCl (Dilaudid Standard PCA) 12 mg STK-MED ONCE IV ;  Start 4/29/20

at 10:50;  Stop 5/1/20 at 11:02;  Status DC


Hydromorphone HCl (Dilaudid Standard PCA) 12 mg STK-MED ONCE IV ;  Start 4/30/20

at 13:47;  Stop 5/1/20 at 11:03;  Status DC


Potassium Acetate 30 meq/Magnesium Sulfate 20 meq/ Calcium Gluconate 10 meq/ 

Multivitamins 10 ml/Chromium/ Copper/Manganese/ Seleni/Zn 0.5 ml/ Insulin Human 

Regular 30 unit/ Potassium Chloride 30 meq/ Total Parenteral Nutrition/Amino 

Acids/Dextrose/ Fat Emulsion Intravenous 1,920 ml @  80 mls/hr TPN  CONT IV  

Last administered on 5/1/20at 22:34;  Start 5/1/20 at 22:00;  Stop 5/2/20 at 

21:59;  Status DC


Potassium Chloride/Water 100 ml @  100 mls/hr Q1H IV  Last administered on 

5/2/20at 13:05;  Start 5/2/20 at 07:00;  Stop 5/2/20 at 10:59;  Status DC


Magnesium Sulfate 50 ml @ 25 mls/hr 1X  ONCE IV  Last administered on 5/2/20at 

10:34;  Start 5/2/20 at 10:30;  Stop 5/2/20 at 12:29;  Status DC


Potassium Chloride 75 meq/ Magnesium Sulfate 20 meq/Calcium Gluconate 10 meq/ 

Multivitamins 10 ml/Chromium/ Copper/Manganese/ Seleni/Zn 0.5 ml/ Insulin Human 

Regular 30 unit/ Total Parenteral Nutrition/Amino Acids/Dextrose/ Fat Emulsion 

Intravenous 1,920 ml @  80 mls/hr TPN  CONT IV  Last administered on 5/2/20at 

21:51;  Start 5/2/20 at 22:00;  Stop 5/3/20 at 22:00;  Status DC


Potassium Chloride 75 meq/ Magnesium Sulfate 20 meq/Calcium Gluconate 10 meq/ 

Multivitamins 10 ml/Chromium/ Copper/Manganese/ Seleni/Zn 0.5 ml/ Insulin Human 

Regular 25 unit/ Total Parenteral Nutrition/Amino Acids/Dextrose/ Fat Emulsion 

Intravenous 1,920 ml @  80 mls/hr TPN  CONT IV  Last administered on 5/3/20at 

22:04;  Start 5/3/20 at 22:00;  Stop 5/4/20 at 21:59;  Status DC


Hydromorphone HCl (Dilaudid) 0.4 mg PRN Q4HRS  PRN IVP PAIN Last administered on

5/4/20at 10:57;  Start 5/4/20 at 09:00;  Stop 5/4/20 at 18:59;  Status DC


Micafungin Sodium 100 mg/Dextrose 100 ml @  100 mls/hr Q24H IV  Last admin

istered on 5/26/20at 12:17;  Start 5/4/20 at 11:00;  Stop 5/27/20 at 09:59;  

Status DC


Daptomycin 485 mg/ Sodium Chloride 50 ml @  100 mls/hr Q24H IV  Last 

administered on 5/11/20at 13:10;  Start 5/4/20 at 11:00;  Stop 5/12/20 at 07:44;

 Status DC


Potassium Chloride 75 meq/ Magnesium Sulfate 15 meq/Calcium Gluconate 8 meq/ 

Multivitamins 10 ml/Chromium/ Copper/Manganese/ Seleni/Zn 0.5 ml/ Insulin Human 

Regular 25 unit/ Total Parenteral Nutrition/Amino Acids/Dextrose/ Fat Emulsion 

Intravenous 1,920 ml @  80 mls/hr TPN  CONT IV  Last administered on 5/4/20at 

23:08;  Start 5/4/20 at 22:00;  Stop 5/5/20 at 21:59;  Status DC


Haloperidol Lactate (Haldol Inj) 3 mg 1X  ONCE IVP  Last administered on 

5/4/20at 14:37;  Start 5/4/20 at 14:30;  Stop 5/4/20 at 14:31;  Status DC


Hydromorphone HCl (Dilaudid) 1 mg PRN Q4HRS  PRN IVP PAIN Last administered on 

5/18/20at 06:25;  Start 5/4/20 at 19:00;  Stop 5/18/20 at 17:10;  Status DC


Potassium Chloride 75 meq/ Magnesium Sulfate 15 meq/Calcium Gluconate 8 meq/ 

Multivitamins 10 ml/Chromium/ Copper/Manganese/ Seleni/Zn 0.5 ml/ Insulin Human 

Regular 20 unit/ Total Parenteral Nutrition/Amino Acids/Dextrose/ Fat Emulsion 

Intravenous 1,920 ml @  80 mls/hr TPN  CONT IV  Last administered on 5/5/20at 

22:10;  Start 5/5/20 at 22:00;  Stop 5/6/20 at 21:59;  Status DC


Lidocaine HCl (Buffered Lidocaine 1%) 3 ml STK-MED ONCE .ROUTE ;  Start 5/6/20 

at 11:31;  Stop 5/6/20 at 11:31;  Status DC


Lidocaine HCl (Buffered Lidocaine 1%) 3 ml STK-MED ONCE .ROUTE ;  Start 5/6/20 

at 12:28;  Stop 5/6/20 at 12:29;  Status DC


Lidocaine HCl (Buffered Lidocaine 1%) 6 ml 1X  ONCE INJ  Last administered on 

5/6/20at 12:53;  Start 5/6/20 at 12:45;  Stop 5/6/20 at 12:46;  Status DC


Potassium Chloride 75 meq/ Magnesium Sulfate 15 meq/Calcium Gluconate 8 meq/ 

Multivitamins 10 ml/Chromium/ Copper/Manganese/ Seleni/Zn 0.5 ml/ Insulin Human 

Regular 20 unit/ Total Parenteral Nutrition/Amino Acids/Dextrose/ Fat Emulsion 

Intravenous 1,920 ml @  80 mls/hr TPN  CONT IV  Last administered on 5/6/20at 

22:00;  Start 5/6/20 at 22:00;  Stop 5/7/20 at 21:59;  Status DC


Potassium Chloride 75 meq/ Magnesium Sulfate 15 meq/Calcium Gluconate 8 meq/ 

Multivitamins 10 ml/Chromium/ Copper/Manganese/ Seleni/Zn 0.5 ml/ Insulin Human 

Regular 15 unit/ Total Parenteral Nutrition/Amino Acids/Dextrose/ Fat Emulsion 

Intravenous 1,920 ml @  80 mls/hr TPN  CONT IV  Last administered on 5/7/20at 

22:28;  Start 5/7/20 at 22:00;  Stop 5/8/20 at 21:59;  Status DC


Vecuronium Bromide (Norcuron Bolus) 6 mg PRN Q6HRS  PRN IV VENT ASYNCHRONY;  

Start 5/7/20 at 19:15;  Stop 5/7/20 at 19:35;  Status DC


Bumetanide (Bumex) 2 mg 1X  ONCE IV  Last administered on 5/7/20at 22:09;  Start

5/7/20 at 19:45;  Stop 5/7/20 at 19:46;  Status DC


Lidocaine HCl (Buffered Lidocaine 1%) 3 ml STK-MED ONCE .ROUTE ;  Start 5/8/20 

at 07:59;  Stop 5/8/20 at 07:59;  Status DC


Midazolam HCl (Versed) 5 mg STK-MED ONCE .ROUTE ;  Start 5/8/20 at 08:36;  Stop 

5/8/20 at 08:36;  Status DC


Fentanyl Citrate (Fentanyl 5ml Vial) 250 mcg STK-MED ONCE .ROUTE ;  Start 5/8/20

at 08:36;  Stop 5/8/20 at 08:37;  Status DC


Lidocaine HCl (Buffered Lidocaine 1%) 3 ml 1X  ONCE IJ  Last administered on 

5/8/20at 09:30;  Start 5/8/20 at 09:15;  Stop 5/8/20 at 09:16;  Status DC


Midazolam HCl (Versed) 5 mg 1X  ONCE IV  Last administered on 5/8/20at 09:30;  

Start 5/8/20 at 09:15;  Stop 5/8/20 at 09:16;  Status DC


Fentanyl Citrate (Fentanyl 5ml Vial) 250 mcg 1X  ONCE IV  Last administered on 

5/8/20at 09:30;  Start 5/8/20 at 09:15;  Stop 5/8/20 at 09:16;  Status DC


Bumetanide (Bumex) 2 mg DAILY IV  Last administered on 5/18/20at 08:07;  Start 

5/8/20 at 10:00;  Stop 5/18/20 at 17:15;  Status DC


Potassium Chloride 75 meq/ Magnesium Sulfate 15 meq/ Multivitamins 10 

ml/Chromium/ Copper/Manganese/ Seleni/Zn 0.5 ml/ Insulin Human Regular 15 unit/ 

Total Parenteral Nutrition/Amino Acids/Dextrose/ Fat Emulsion Intravenous 1,920 

ml @  80 mls/hr TPN  CONT IV  Last administered on 5/8/20at 21:59;  Start 5/8/20

at 22:00;  Stop 5/9/20 at 21:59;  Status DC


Metoclopramide HCl (Reglan Vial) 10 mg PRN Q3HRS  PRN IVP NAUSEA/VOMITING-3rd 

choice Last administered on 5/14/20at 04:25;  Start 5/9/20 at 16:45


Potassium Chloride 75 meq/ Magnesium Sulfate 15 meq/ Multivitamins 10 

ml/Chromium/ Copper/Manganese/ Seleni/Zn 0.5 ml/ Insulin Human Regular 15 unit/ 

Total Parenteral Nutrition/Amino Acids/Dextrose/ Fat Emulsion Intravenous 1,920 

ml @  80 mls/hr TPN  CONT IV  Last administered on 5/9/20at 22:41;  Start 5/9/20

at 22:00;  Stop 5/10/20 at 21:59;  Status DC


Magnesium Sulfate 50 ml @ 25 mls/hr 1X  ONCE IV  Last administered on 5/10/20at 

10:44;  Start 5/10/20 at 09:00;  Stop 5/10/20 at 10:59;  Status DC


Potassium Chloride/Water 100 ml @  100 mls/hr 1X  ONCE IV  Last administered on 

5/10/20at 09:37;  Start 5/10/20 at 09:00;  Stop 5/10/20 at 09:59;  Status DC


Duloxetine HCl (Cymbalta) 30 mg DAILY PO  Last administered on 5/11/20at 09:48; 

Start 5/10/20 at 14:00;  Stop 5/13/20 at 10:25;  Status DC


Potassium Chloride 80 meq/ Magnesium Sulfate 20 meq/ Multivitamins 10 

ml/Chromium/ Copper/Manganese/ Seleni/Zn 0.5 ml/ Insulin Human Regular 15 unit/ 

Total Parenteral Nutrition/Amino Acids/Dextrose/ Fat Emulsion Intravenous 1,920 

ml @  80 mls/hr TPN  CONT IV  Last administered on 5/10/20at 21:42;  Start 

5/10/20 at 22:00;  Stop 5/11/20 at 21:59;  Status DC


Potassium Chloride 80 meq/ Magnesium Sulfate 20 meq/ Multivitamins 10 

ml/Chromium/ Copper/Manganese/ Seleni/Zn 0.5 ml/ Insulin Human Regular 15 unit/ 

Total Parenteral Nutrition/Amino Acids/Dextrose/ Fat Emulsion Intravenous 1,920 

ml @  80 mls/hr TPN  CONT IV  Last administered on 5/11/20at 22:20;  Start 

5/11/20 at 22:00;  Stop 5/12/20 at 21:59;  Status DC


Lidocaine HCl (Buffered Lidocaine 1%) 3 ml STK-MED ONCE .ROUTE ;  Start 5/12/20 

at 09:54;  Stop 5/12/20 at 09:55;  Status DC


Hydromorphone HCl (Dilaudid Standard PCA) 12 mg STK-MED ONCE IV ;  Start 5/1/20 

at 15:50;  Stop 5/12/20 at 11:24;  Status DC


Potassium Chloride 80 meq/ Magnesium Sulfate 20 meq/ Multivitamins 10 

ml/Chromium/ Copper/Manganese/ Seleni/Zn 0.5 ml/ Insulin Human Regular 15 unit/ 

Total Parenteral Nutrition/Amino Acids/Dextrose/ Fat Emulsion Intravenous 1,920 

ml @  80 mls/hr TPN  CONT IV  Last administered on 5/12/20at 21:40;  Start 

5/12/20 at 22:00;  Stop 5/13/20 at 21:59;  Status DC


Lidocaine HCl (Buffered Lidocaine 1%) 6 ml 1X  ONCE INJ  Last administered on 

5/12/20at 14:15;  Start 5/12/20 at 14:15;  Stop 5/12/20 at 14:16;  Status DC


Potassium Chloride 80 meq/ Magnesium Sulfate 20 meq/ Multivitamins 10 

ml/Chromium/ Copper/Manganese/ Seleni/Zn 1 ml/ Insulin Human Regular 15 unit/ 

Total Parenteral Nutrition/Amino Acids/Dextrose/ Fat Emulsion Intravenous 1,920 

ml @  80 mls/hr TPN  CONT IV  Last administered on 5/13/20at 22:04;  Start 

5/13/20 at 22:00;  Stop 5/14/20 at 21:59;  Status DC


Potassium Chloride/Water 100 ml @  100 mls/hr 1X  ONCE IV  Last administered on 

5/14/20at 11:34;  Start 5/14/20 at 11:00;  Stop 5/14/20 at 11:59;  Status DC


Potassium Chloride 90 meq/ Magnesium Sulfate 20 meq/ Multivitamins 10 

ml/Chromium/ Copper/Manganese/ Seleni/Zn 1 ml/ Insulin Human Regular 15 unit/ 

Total Parenteral Nutrition/Amino Acids/Dextrose/ Fat Emulsion Intravenous 1,920 

ml @  80 mls/hr TPN  CONT IV  Last administered on 5/14/20at 22:57;  Start 

5/14/20 at 22:00;  Stop 5/15/20 at 21:59;  Status DC


Potassium Chloride 90 meq/ Magnesium Sulfate 20 meq/ Multivitamins 10 

ml/Chromium/ Copper/Manganese/ Seleni/Zn 1 ml/ Insulin Human Regular 15 unit/ 

Total Parenteral Nutrition/Amino Acids/Dextrose/ Fat Emulsion Intravenous 1,920 

ml @  80 mls/hr TPN  CONT IV  Last administered on 5/15/20at 22:48;  Start 

5/15/20 at 22:00;  Stop 5/16/20 at 21:59;  Status DC


Potassium Chloride 90 meq/ Magnesium Sulfate 20 meq/ Multivitamins 10 

ml/Chromium/ Copper/Manganese/ Seleni/Zn 1 ml/ Insulin Human Regular 15 unit/ 

Total Parenteral Nutrition/Amino Acids/Dextrose/ Fat Emulsion Intravenous 1,890 

ml @  78.75 mls/ hr TPN  CONT IV  Last administered on 5/16/20at 22:15;  Start 

5/16/20 at 22:00;  Stop 5/17/20 at 21:59;  Status DC


Linezolid/Dextrose 300 ml @  300 mls/hr Q12HR IV  Last administered on 5/19/20at

21:08;  Start 5/17/20 at 09:00;  Stop 5/20/20 at 08:11;  Status DC


Daptomycin 450 mg/ Sodium Chloride 50 ml @  100 mls/hr Q24H IV  Last 

administered on 5/20/20at 09:25;  Start 5/17/20 at 09:00;  Stop 5/21/20 at 

08:30;  Status DC


Potassium Chloride 90 meq/ Magnesium Sulfate 20 meq/ Multivitamins 10 

ml/Chromium/ Copper/Manganese/ Seleni/Zn 1 ml/ Insulin Human Regular 15 unit/ 

Total Parenteral Nutrition/Amino Acids/Dextrose/ Fat Emulsion Intravenous 1,890 

ml @  78.75 mls/ hr TPN  CONT IV  Last administered on 5/17/20at 21:34;  Start 

5/17/20 at 22:00;  Stop 5/18/20 at 21:59;  Status DC


Lorazepam (Ativan Inj) 2 mg STK-MED ONCE .ROUTE ;  Start 5/17/20 at 14:58;  Stop

5/17/20 at 14:58;  Status DC


Metoprolol Tartrate (Lopressor Vial) 5 mg 1X  ONCE IVP  Last administered on 

5/17/20at 15:31;  Start 5/17/20 at 15:15;  Stop 5/17/20 at 15:16;  Status DC


Lorazepam (Ativan Inj) 2 mg 1X  ONCE IVP  Last administered on 5/17/20at 15:30; 

Start 5/17/20 at 15:15;  Stop 5/17/20 at 15:16;  Status DC


Enoxaparin Sodium (Lovenox 40mg Syringe) 40 mg Q24H SQ  Last administered on 

6/5/20at 17:44;  Start 5/17/20 at 17:00;  Stop 6/7/20 at 06:50;  Status DC


Lorazepam (Ativan Inj) 1 mg PRN Q4HRS  PRN IVP ANXIETY / AGITATION MILD-MOD Last

administered on 5/31/20at 15:55;  Start 5/17/20 at 19:15;  Stop 6/2/20 at 11:45;

 Status DC


Lorazepam (Ativan Inj) 2 mg PRN Q4HRS  PRN IVP ANXIETY / AGITATION SEVERE Last 

administered on 6/1/20at 07:55;  Start 5/17/20 at 19:15;  Stop 6/2/20 at 11:45; 

Status DC


Fentanyl Citrate (Fentanyl 2ml Vial) 50 mcg PRN Q4HRS  PRN IVP SEVERE PAIN Last 

administered on 6/13/20at 05:15;  Start 5/18/20 at 13:15;  Stop 6/14/20 at 

09:29;  Status DC


Fentanyl Citrate (Fentanyl 2ml Vial) 25 mcg PRN Q4HRS  PRN IVP MODERATE PAIN 

Last administered on 6/13/20at 00:27;  Start 5/18/20 at 13:15;  Stop 6/14/20 at 

09:30;  Status DC


Potassium Chloride 90 meq/ Magnesium Sulfate 20 meq/ Multivitamins 10 

ml/Chromium/ Copper/Manganese/ Seleni/Zn 1 ml/ Insulin Human Regular 15 unit/ 

Total Parenteral Nutrition/Amino Acids/Dextrose/ Fat Emulsion Intravenous 1,890 

ml @  78.75 mls/ hr TPN  CONT IV  Last administered on 5/18/20at 22:18;  Start 

5/18/20 at 22:00;  Stop 5/19/20 at 21:59;  Status DC


Furosemide (Lasix) 40 mg 1X  ONCE IVP  Last administered on 5/18/20at 21:51;  

Start 5/18/20 at 21:45;  Stop 5/18/20 at 21:48;  Status DC


Albumin Human 100 ml @  100 mls/hr 1X PRN  PRN IV SEE COMMENTS;  Start 5/19/20 

at 01:30


Furosemide (Lasix) 40 mg [id number] IVP  Last administered on 6/3/20at 08:04;  Start 

5/19/20 at 14:00;  Stop 6/3/20 at 13:07;  Status DC


Potassium Chloride 90 meq/ Magnesium Sulfate 20 meq/ Multivitamins 10 

ml/Chromium/ Copper/Manganese/ Seleni/Zn 1 ml/ Insulin Human Regular 15 unit/ 

Total Parenteral Nutrition/Amino Acids/Dextrose/ Fat Emulsion Intravenous 1,800 

ml @  75 mls/hr TPN  CONT IV  Last administered on 5/19/20at 22:31;  Start 

5/19/20 at 22:00;  Stop 5/20/20 at 21:59;  Status DC


Potassium Chloride 90 meq/ Magnesium Sulfate 20 meq/ Multivitamins 10 

ml/Chromium/ Copper/Manganese/ Seleni/Zn 1 ml/ Insulin Human Regular 15 unit/ 

Total Parenteral Nutrition/Amino Acids/Dextrose/ Fat Emulsion Intravenous 1,800 

ml @  75 mls/hr TPN  CONT IV  Last administered on 5/20/20at 22:28;  Start 

5/20/20 at 22:00;  Stop 5/21/20 at 21:59;  Status DC


Potassium Chloride 110 meq/ Magnesium Sulfate 20 meq/ Multivitamins 10 

ml/Chromium/ Copper/Manganese/ Seleni/Zn 1 ml/ Insulin Human Regular 15 unit/ 

Total Parenteral Nutrition/Amino Acids/Dextrose/ Fat Emulsion Intravenous 1,800 

ml @  75 mls/hr TPN  CONT IV  Last administered on 5/21/20at 22:01;  Start 

5/21/20 at 22:00;  Stop 5/22/20 at 21:59;  Status DC


Saliva Substitute (Biotene Moisturizing Mouth) 2 spray PRN Q15MIN  PRN PO DRY 

MOUTH;  Start 5/21/20 at 11:00


Potassium Chloride 110 meq/ Magnesium Sulfate 20 meq/ Multivitamins 10 

ml/Chromium/ Copper/Manganese/ Seleni/Zn 1 ml/ Insulin Human Regular 15 unit/ 

Total Parenteral Nutrition/Amino Acids/Dextrose/ Fat Emulsion Intravenous 1,800 

ml @  75 mls/hr TPN  CONT IV  Last administered on 5/22/20at 22:21;  Start 

5/22/20 at 22:00;  Stop 5/23/20 at 21:59;  Status DC


Potassium Chloride 110 meq/ Magnesium Sulfate 20 meq/ Multivitamins 10 

ml/Chromium/ Copper/Manganese/ Seleni/Zn 1 ml/ Insulin Human Regular 15 unit/ 

Total Parenteral Nutrition/Amino Acids/Dextrose/ Fat Emulsion Intravenous 1,800 

ml @  75 mls/hr TPN  CONT IV  Last administered on 5/23/20at 22:04;  Start 

5/23/20 at 22:00;  Stop 5/24/20 at 21:59;  Status DC


Potassium Chloride 110 meq/ Magnesium Sulfate 20 meq/ Multivitamins 10 

ml/Chromium/ Copper/Manganese/ Seleni/Zn 1 ml/ Insulin Human Regular 15 unit/ 

Total Parenteral Nutrition/Amino Acids/Dextrose/ Fat Emulsion Intravenous 1,800 

ml @  75 mls/hr TPN  CONT IV  Last administered on 5/24/20at 22:48;  Start 

5/24/20 at 22:00;  Stop 5/25/20 at 21:59;  Status DC


Potassium Chloride 70 meq/ Magnesium Sulfate 20 meq/ Multivitamins 10 

ml/Chromium/ Copper/Manganese/ Seleni/Zn 1 ml/ Insulin Human Regular 15 unit/ 

Total Parenteral Nutrition/Amino Acids/Dextrose/ Fat Emulsion Intravenous 1,800 

ml @  75 mls/hr TPN  CONT IV  Last administered on 5/25/20at 21:39;  Start 

5/25/20 at 22:00;  Stop 5/26/20 at 21:59;  Status DC


Meropenem 500 mg/ Sodium Chloride 50 ml @  100 mls/hr Q6HRS IV  Last 

administered on 5/27/20at 06:02;  Start 5/25/20 at 18:00;  Stop 5/27/20 at 

09:59;  Status DC


Barium Sulfate (Varibar Thin Liquid Apple) 148 gm 1X  ONCE PO ;  Start 5/26/20 

at 11:45;  Stop 5/26/20 at 11:49;  Status DC


Potassium Chloride 70 meq/ Magnesium Sulfate 20 meq/ Multivitamins 10 

ml/Chromium/ Copper/Manganese/ Seleni/Zn 1 ml/ Insulin Human Regular 15 unit/ T

otal Parenteral Nutrition/Amino Acids/Dextrose/ Fat Emulsion Intravenous 1,800 

ml @  75 mls/hr TPN  CONT IV  Last administered on 5/26/20at 22:27;  Start 

5/26/20 at 22:00;  Stop 5/27/20 at 21:59;  Status DC


Piperacillin Sod/ Tazobactam Sod 3.375 gm/Sodium Chloride 50 ml @  100 mls/hr 

Q6HRS IV  Last administered on 6/4/20at 06:10;  Start 5/27/20 at 12:00;  Stop 

6/4/20 at 07:26;  Status DC


Potassium Chloride 70 meq/ Magnesium Sulfate 20 meq/ Multivitamins 10 

ml/Chromium/ Copper/Manganese/ Seleni/Zn 1 ml/ Insulin Human Regular 15 unit/ 

Total Parenteral Nutrition/Amino Acids/Dextrose/ Fat Emulsion Intravenous 1,800 

ml @  75 mls/hr TPN  CONT IV  Last administered on 5/27/20at 22:03;  Start 

5/27/20 at 22:00;  Stop 5/28/20 at 21:59;  Status DC


Potassium Chloride 70 meq/ Magnesium Sulfate 20 meq/ Multivitamins 10 

ml/Chromium/ Copper/Manganese/ Seleni/Zn 1 ml/ Insulin Human Regular 15 unit/ T

otal Parenteral Nutrition/Amino Acids/Dextrose/ Fat Emulsion Intravenous 1,800 

ml @  75 mls/hr TPN  CONT IV  Last administered on 5/28/20at 22:33;  Start 

5/28/20 at 22:00;  Stop 5/29/20 at 21:59;  Status DC


Potassium Chloride 70 meq/ Magnesium Sulfate 20 meq/ Multivitamins 10 

ml/Chromium/ Copper/Manganese/ Seleni/Zn 1 ml/ Insulin Human Regular 15 unit/ 

Total Parenteral Nutrition/Amino Acids/Dextrose/ Fat Emulsion Intravenous 1,800 

ml @  75 mls/hr TPN  CONT IV  Last administered on 5/29/20at 23:13;  Start 5/ 29/20 at 22:00;  Stop 5/30/20 at 21:59;  Status DC


Potassium Chloride 80 meq/ Magnesium Sulfate 20 meq/ Multivitamins 10 ml/

mium/ Copper/Manganese/ Seleni/Zn 1 ml/ Insulin Human Regular 15 unit/ Total 

Parenteral Nutrition/Amino Acids/Dextrose/ Fat Emulsion Intravenous 1,800 ml @  

75 mls/hr TPN  CONT IV  Last administered on 5/30/20at 22:30;  Start 5/30/20 at 

22:00;  Stop 5/31/20 at 21:59;  Status DC


Potassium Chloride 80 meq/ Magnesium Sulfate 20 meq/ Multivitamins 10 

ml/Chromium/ Copper/Manganese/ Seleni/Zn 1 ml/ Insulin Human Regular 15 unit/ 

Total Parenteral Nutrition/Amino Acids/Dextrose/ Fat Emulsion Intravenous 1,800 

ml @  75 mls/hr TPN  CONT IV  Last administered on 5/31/20at 21:54;  Start 

5/31/20 at 22:00;  Stop 6/1/20 at 21:59;  Status DC


Potassium Chloride/Water 100 ml @  100 mls/hr 1X  ONCE IV  Last administered on 

6/1/20at 10:15;  Start 6/1/20 at 10:00;  Stop 6/1/20 at 10:59;  Status DC


Potassium Chloride 90 meq/ Magnesium Sulfate 20 meq/ Multivitamins 10 

ml/Chromium/ Copper/Manganese/ Seleni/Zn 1 ml/ Insulin Human Regular 20 unit/ 

Total Parenteral Nutrition/Amino Acids/Dextrose/ Fat Emulsion Intravenous 1,800 

ml @  75 mls/hr TPN  CONT IV  Last administered on 6/1/20at 22:28;  Start 6/1/20

at 22:00;  Stop 6/2/20 at 21:59;  Status DC


Potassium Chloride 90 meq/ Magnesium Sulfate 20 meq/ Multivitamins 10 

ml/Chromium/ Copper/Manganese/ Seleni/Zn 1 ml/ Insulin Human Regular 20 unit/ 

Total Parenteral Nutrition/Amino Acids/Dextrose/ Fat Emulsion Intravenous 1,800 

ml @  75 mls/hr TPN  CONT IV  Last administered on 6/2/20at 22:08;  Start 6/2/20

at 22:00;  Stop 6/3/20 at 21:59;  Status DC


Lorazepam (Ativan Inj) 0.25 mg PRN Q4HRS  PRN IVP ANXIETY / AGITATION Last 

administered on 6/13/20at 02:25;  Start 6/3/20 at 07:30


Potassium Chloride 90 meq/ Magnesium Sulfate 20 meq/ Multivitamins 10 

ml/Chromium/ Copper/Manganese/ Seleni/Zn 1 ml/ Insulin Human Regular 20 unit/ 

Total Parenteral Nutrition/Amino Acids/Dextrose/ Fat Emulsion Intravenous 1,800 

ml @  75 mls/hr TPN  CONT IV  Last administered on 6/3/20at 23:13;  Start 6/3/20

at 22:00;  Stop 6/4/20 at 21:59;  Status DC


Furosemide (Lasix) 40 mg DAILY IVP  Last administered on 6/5/20at 11:14;  Start 

6/3/20 at 13:30;  Stop 6/7/20 at 09:12;  Status DC


Fluoxetine HCl (PROzac) 20 mg QHS PEG  Last administered on 6/21/20at 22:19;  

Start 6/4/20 at 21:00


Fentanyl (Duragesic 50mcg/ Hr Patch) 1 patch Q72H TD  Last administered on 

6/4/20at 21:22;  Start 6/4/20 at 21:00;  Stop 6/13/20 at 12:00;  Status DC


Potassium Chloride 40 meq/ Potassium Acetate 60 meq/Magnesium Sulfate 10 meq/ 

Multivitamins 10 ml/Chromium/ Copper/Manganese/ Seleni/Zn 1 ml/ Insulin Human 

Regular 20 unit/ Total Parenteral Nutrition/Amino Acids/Dextrose/ Fat Emulsion 

Intravenous 1,800 ml @  75 mls/hr TPN  CONT IV  Last administered on 6/5/20at 

00:03;  Start 6/4/20 at 22:00;  Stop 6/5/20 at 21:59;  Status DC


Potassium Acetate 80 meq/Magnesium Sulfate 5 meq/ Multivitamins 10 ml/Chromium/ 

Copper/Manganese/ Seleni/Zn 1 ml/ Insulin Human Regular 20 unit/ Total 

Parenteral Nutrition/Amino Acids/Dextrose/ Fat Emulsion Intravenous 1,920 ml @  

80 mls/hr TPN  CONT IV  Last administered on 6/5/20at 21:59;  Start 6/5/20 at 

22:00;  Stop 6/6/20 at 21:59;  Status DC


Potassium Acetate 60 meq/Magnesium Sulfate 5 meq/ Multivitamins 10 ml/Chromium/ 

Copper/Manganese/ Seleni/Zn 1 ml/ Insulin Human Regular 30 unit/ Total 

Parenteral Nutrition/Amino Acids/Dextrose/ Fat Emulsion Intravenous 1,920 ml @  

80 mls/hr TPN  CONT IV  Last administered on 6/6/20at 21:54;  Start 6/6/20 at 

22:00;  Stop 6/7/20 at 21:59;  Status DC


Norepinephrine Bitartrate 8 mg/ Dextrose 258 ml @  13.332 mls/ hr CONT  PRN IV 

PER PROTOCOL Last administered on 6/7/20at 21:46;  Start 6/7/20 at 06:30


Albumin Human 500 ml @  125 mls/hr 1X  ONCE IV  Last administered on 6/7/20at 

08:10;  Start 6/7/20 at 08:15;  Stop 6/7/20 at 12:14;  Status DC


Potassium Acetate 40 meq/Magnesium Sulfate 5 meq/ Multivitamins 10 ml/Chromium/ 

Copper/Manganese/ Seleni/Zn 1 ml/ Insulin Human Regular 30 unit/ Total 

Parenteral Nutrition/Amino Acids/Dextrose/ Fat Emulsion Intravenous 1,920 ml @  

80 mls/hr TPN  CONT IV  Last administered on 6/7/20at 22:23;  Start 6/7/20 at 

22:00;  Stop 6/8/20 at 21:59;  Status DC


Meropenem 1 gm/ Sodium Chloride 100 ml @  200 mls/hr Q8HRS IV ;  Start 6/7/20 at

14:00;  Status Cancel


Meropenem 1 gm/ Sodium Chloride 100 ml @  200 mls/hr Q8HRS IV  Last administered

on 6/7/20at 11:04;  Start 6/7/20 at 10:00;  Stop 6/7/20 at 13:00;  Status DC


Meropenem 1 gm/ Sodium Chloride 100 ml @  200 mls/hr Q12HR IV  Last administered

on 6/22/20at 08:55;  Start 6/7/20 at 21:00


Sodium Chloride 1,000 ml @  1,000 mls/hr 1X  ONCE IV  Last administered on 

6/7/20at 11:06;  Start 6/7/20 at 10:45;  Stop 6/7/20 at 11:44;  Status DC


Micafungin Sodium 100 mg/Dextrose 100 ml @  100 mls/hr Q24H IV  Last 

administered on 6/21/20at 11:45;  Start 6/7/20 at 11:00


Daptomycin 410 mg/ Sodium Chloride 50 ml @  100 mls/hr Q24H IV  Last 

administered on 6/9/20at 13:33;  Start 6/7/20 at 14:00;  Stop 6/10/20 at 08:30; 

Status DC


Midazolam HCl (Versed) 2 mg STK-MED ONCE .ROUTE ;  Start 6/7/20 at 14:47;  Stop 

6/7/20 at 14:48;  Status DC


Fentanyl Citrate (Fentanyl 2ml Vial) 100 mcg STK-MED ONCE .ROUTE ;  Start 6/7/20

at 14:47;  Stop 6/7/20 at 14:48;  Status DC


Flumazenil (Romazicon) 0.5 mg STK-MED ONCE IV ;  Start 6/7/20 at 14:48;  Stop 

6/7/20 at 14:48;  Status DC


Naloxone HCl (Narcan) 0.4 mg STK-MED ONCE .ROUTE ;  Start 6/7/20 at 14:48;  Stop

6/7/20 at 14:48;  Status DC


Lidocaine HCl (Lidocaine 1% 20ml Vial) 20 ml STK-MED ONCE .ROUTE ;  Start 6/7/20

at 14:48;  Stop 6/7/20 at 14:48;  Status DC


Midazolam HCl (Versed) 2 mg 1X  ONCE IV  Last administered on 6/7/20at 15:28;  

Start 6/7/20 at 15:00;  Stop 6/7/20 at 15:01;  Status DC


Fentanyl Citrate (Fentanyl 2ml Vial) 100 mcg 1X  ONCE IV  Last administered on 

6/7/20at 15:28;  Start 6/7/20 at 15:00;  Stop 6/7/20 at 15:01;  Status DC


Lidocaine HCl (Lidocaine 1% 20ml Vial) 20 ml 1X  ONCE INJ  Last administered on 

6/7/20at 15:30;  Start 6/7/20 at 15:00;  Stop 6/7/20 at 15:01;  Status DC


Sodium Chloride 1,000 ml @  100 mls/hr Q10H IV  Last administered on 6/16/20at 

07:30;  Start 6/7/20 at 20:00;  Stop 6/16/20 at 11:26;  Status DC


Sodium Bicarbonate (Sodium Bicarb Adult 8.4% Syr) 50 meq 1X  ONCE IV  Last 

administered on 6/7/20at 21:47;  Start 6/7/20 at 22:00;  Stop 6/7/20 at 22:01;  

Status DC


Potassium Acetate 40 meq/Magnesium Sulfate 5 meq/ Multivitamins 10 ml/Chromium/ 

Copper/Manganese/ Seleni/Zn 1 ml/ Insulin Human Regular 30 unit/ Total 

Parenteral Nutrition/Amino Acids/Dextrose/ Fat Emulsion Intravenous 1,920 ml @  

80 mls/hr TPN  CONT IV  Last administered on 6/8/20at 22:28;  Start 6/8/20 at 

22:00;  Stop 6/9/20 at 21:59;  Status DC


Sodium Chloride 500 ml @  500 mls/hr 1X  ONCE IV  Last administered on 6/9/20at 

06:39;  Start 6/9/20 at 06:45;  Stop 6/9/20 at 07:44;  Status DC


Potassium Acetate 40 meq/Magnesium Sulfate 5 meq/ Multivitamins 10 ml/Chromium/ 

Copper/Manganese/ Seleni/Zn 1 ml/ Insulin Human Regular 30 unit/ Total 

Parenteral Nutrition/Amino Acids/Dextrose/ Fat Emulsion Intravenous 1,920 ml @  

80 mls/hr TPN  CONT IV  Last administered on 6/9/20at 22:03;  Start 6/9/20 at 

22:00;  Stop 6/10/20 at 21:59;  Status DC


Metoprolol Tartrate (Lopressor Vial) 5 mg PRN Q6HRS  PRN IVP HYPERTENSION Last 

administered on 6/18/20at 10:14;  Start 6/10/20 at 09:00


Potassium Acetate 40 meq/Magnesium Sulfate 5 meq/ Multivitamins 10 ml/Chromium/ 

Copper/Manganese/ Seleni/Zn 1 ml/ Insulin Human Regular 30 unit/ Total 

Parenteral Nutrition/Amino Acids/Dextrose/ Fat Emulsion Intravenous 1,920 ml @  

80 mls/hr TPN  CONT IV  Last administered on 6/10/20at 21:26;  Start 6/10/20 at 

22:00;  Stop 6/11/20 at 21:59;  Status DC


Potassium Acetate 40 meq/Magnesium Sulfate 5 meq/ Multivitamins 10 ml/Chromium/ 

Copper/Manganese/ Seleni/Zn 1 ml/ Insulin Human Regular 30 unit/ Total 

Parenteral Nutrition/Amino Acids/Dextrose/ Fat Emulsion Intravenous 1,920 ml @  

80 mls/hr TPN  CONT IV  Last administered on 6/11/20at 23:23;  Start 6/11/20 at 

22:00;  Stop 6/12/20 at 21:59;  Status DC


Potassium Acetate 40 meq/Magnesium Sulfate 5 meq/ Multivitamins 10 ml/Chromium/ 

Copper/Manganese/ Seleni/Zn 1 ml/ Insulin Human Regular 30 unit/ Total 

Parenteral Nutrition/Amino Acids/Dextrose/ Fat Emulsion Intravenous 1,920 ml @  

80 mls/hr TPN  CONT IV  Last administered on 6/12/20at 21:35;  Start 6/12/20 at 

22:00;  Stop 6/13/20 at 21:59;  Status DC


Furosemide (Lasix) 20 mg 1X  ONCE IVP  Last administered on 6/13/20at 06:26;  

Start 6/13/20 at 06:15;  Stop 6/13/20 at 06:16;  Status DC


Methylprednisolone Sodium Succinate (SOLU-Medrol 125MG VIAL) 125 mg 1X  ONCE IV 

Last administered on 6/13/20at 06:26;  Start 6/13/20 at 06:15;  Stop 6/13/20 at 

06:16;  Status DC


Albuterol/ Ipratropium (Duoneb) 3 ml Q4HRS NEB  Last administered on 6/22/20at 

08:47;  Start 6/13/20 at 08:00


Fentanyl Citrate 30 ml @ 0 mls/hr CONT  PRN IV SEE PROTOCOL Last administered on

6/22/20at 05:36;  Start 6/13/20 at 06:00


Propofol 100 ml @ 0 mls/hr CONT  PRN IV SEE PROTOCOL Last administered on 

6/20/20at 23:50;  Start 6/13/20 at 06:00


Fentanyl Citrate (Fentanyl 2ml Vial) 25 mcg PRN Q1HR  PRN IV SEE COMMENTS;  

Start 6/13/20 at 06:00


Fentanyl Citrate (Fentanyl 2ml Vial) 50 mcg PRN Q1HR  PRN IV SEE COMMENTS;  

Start 6/13/20 at 06:00


Chlorhexidine Gluconate (Peridex) 15 ml BID MM ;  Start 6/13/20 at 09:00;  Stop 

6/13/20 at 07:58;  Status DC


Potassium Acetate 40 meq/Magnesium Sulfate 5 meq/ Multivitamins 10 ml/Chromium/ 

Copper/Manganese/ Seleni/Zn 1 ml/ Insulin Human Regular 30 unit/ Total 

Parenteral Nutrition/Amino Acids/Dextrose/ Fat Emulsion Intravenous 1,920 ml @  

80 mls/hr TPN  CONT IV  Last administered on 6/13/20at 21:19;  Start 6/13/20 at 

22:00;  Stop 6/14/20 at 21:59;  Status DC


Acetylcysteine (Mucomyst 20% Resp Treatment) 600 mg BID NEB  Last administered 

on 6/19/20at 09:33;  Start 6/13/20 at 21:00;  Stop 6/19/20 at 10:39;  Status DC


Magnesium Sulfate 100 ml @  25 mls/hr 1X  ONCE IV  Last administered on 

6/13/20at 15:48;  Start 6/13/20 at 15:45;  Stop 6/13/20 at 19:44;  Status DC


Potassium Acetate 40 meq/Magnesium Sulfate 5 meq/ Multivitamins 10 ml/Chromium/ 

Copper/Manganese/ Seleni/Zn 1 ml/ Insulin Human Regular 30 unit/ Total 

Parenteral Nutrition/Amino Acids/Dextrose/ Fat Emulsion Intravenous 1,920 ml @  

80 mls/hr TPN  CONT IV  Last administered on 6/14/20at 21:35;  Start 6/14/20 at 

22:00;  Stop 6/15/20 at 21:59;  Status DC


Potassium Chloride/Water 100 ml @  100 mls/hr Q1H IV  Last administered on 

6/15/20at 08:31;  Start 6/15/20 at 07:00;  Stop 6/15/20 at 08:59;  Status DC


Potassium Acetate 40 meq/Magnesium Sulfate 5 meq/ Multivitamins 10 ml/Chromium/ 

Copper/Manganese/ Seleni/Zn 1 ml/ Insulin Human Regular 30 unit/ Total 

Parenteral Nutrition/Amino Acids/Dextrose/ Fat Emulsion Intravenous 1,920 ml @  

80 mls/hr TPN  CONT IV  Last administered on 6/15/20at 21:54;  Start 6/15/20 at 

22:00;  Stop 6/16/20 at 19:34;  Status DC


Lidocaine HCl (Buffered Lidocaine 1%) 3 ml STK-MED ONCE .ROUTE ;  Start 6/15/20 

at 12:14;  Stop 6/15/20 at 12:14;  Status DC


Lidocaine HCl (Buffered Lidocaine 1%) 3 ml 1X  ONCE IJ  Last administered on 

6/15/20at 13:11;  Start 6/15/20 at 13:00;  Stop 6/15/20 at 13:01;  Status DC


Magnesium Sulfate 50 ml @ 25 mls/hr 1X  ONCE IV ;  Start 6/16/20 at 08:15;  Stop

6/16/20 at 10:14;  Status DC


Potassium Acetate 40 meq/Magnesium Sulfate 10 meq/ Multivitamins 10 ml/Chromium/

Copper/Manganese/ Seleni/Zn 1 ml/ Insulin Human Regular 20 unit/ Total 

Parenteral Nutrition/Amino Acids/Dextrose/ Fat Emulsion Intravenous 1,920 ml @  

80 mls/hr TPN  CONT IV  Last administered on 6/16/20at 21:32;  Start 6/16/20 at 

22:00;  Stop 6/17/20 at 21:59;  Status DC


Potassium Chloride/Water 100 ml @  100 mls/hr Q1H IV  Last administered on 

6/17/20at 09:12;  Start 6/17/20 at 08:00;  Stop 6/17/20 at 09:59;  Status DC


Alteplase, Recombinant (Cathflo For Central Catheter Clearance) 4 mg 1X  ONCE 

INT CAT ;  Start 6/17/20 at 09:15;  Stop 6/17/20 at 09:16;  Status UNV


Alteplase, Recombinant (Cathflo For Central Catheter Clearance) 4 mg 1X  ONCE 

INT CAT ;  Start 6/17/20 at 09:15;  Stop 6/17/20 at 09:16;  Status UNV


Alteplase, Recombinant (Cathflo For Central Catheter Clearance) 4 mg 1X  ONCE 

INT CAT ;  Start 6/17/20 at 09:15;  Stop 6/17/20 at 09:16;  Status UNV


Alteplase, Recombinant 4 mg/ Sodium Chloride 20 ml @ 20 mls/hr 1X  ONCE IV  Last

administered on 6/17/20at 10:10;  Start 6/17/20 at 10:00;  Stop 6/17/20 at 

10:59;  Status DC


Alteplase, Recombinant 4 mg/ Sodium Chloride 20 ml @ 20 mls/hr 1X  ONCE IV  Last

administered on 6/17/20at 10:09;  Start 6/17/20 at 10:00;  Stop 6/17/20 at 

10:59;  Status DC


Alteplase, Recombinant 4 mg/ Sodium Chloride 20 ml @ 20 mls/hr 1X  ONCE IV  Last

administered on 6/17/20at 10:09;  Start 6/17/20 at 10:00;  Stop 6/17/20 at 

10:59;  Status DC


Potassium Acetate 60 meq/Magnesium Sulfate 10 meq/ Multivitamins 10 ml/Chromium/

Copper/Manganese/ Seleni/Zn 1 ml/ Insulin Human Regular 20 unit/ Total 

Parenteral Nutrition/Amino Acids/Dextrose/ Fat Emulsion Intravenous 1,920 ml @  

80 mls/hr TPN  CONT IV  Last administered on 6/17/20at 21:55;  Start 6/17/20 at 

22:00;  Stop 6/18/20 at 21:59;  Status DC


Albumin Human 500 ml @  125 mls/hr 1X  ONCE IV  Last administered on 6/18/20at 

12:01;  Start 6/18/20 at 11:15;  Stop 6/18/20 at 15:14;  Status DC


Sodium Chloride 500 ml @  500 mls/hr 1X  ONCE IV  Last administered on 6/18/20at

13:50;  Start 6/18/20 at 11:15;  Stop 6/18/20 at 12:14;  Status DC


Potassium Acetate 60 meq/Magnesium Sulfate 14 meq/ Multivitamins 10 ml/Chromium/

Copper/Manganese/ Seleni/Zn 1 ml/ Insulin Human Regular 20 unit/ Total 

Parenteral Nutrition/Amino Acids/Dextrose/ Fat Emulsion Intravenous 1,920 ml @  

80 mls/hr TPN  CONT IV  Last administered on 6/18/20at 22:26;  Start 6/18/20 at 

22:00;  Stop 6/19/20 at 21:59;  Status DC


Ciprofloxacin/ Dextrose 200 ml @  200 mls/hr Q12HR IV  Last administered on 

6/22/20at 08:56;  Start 6/18/20 at 21:00


Albumin Human 250 ml @  62.5 mls/hr 1X  ONCE IV  Last administered on 6/19/20at 

11:09;  Start 6/19/20 at 11:00;  Stop 6/19/20 at 14:59;  Status DC


Furosemide (Lasix) 20 mg 1X  ONCE IVP  Last administered on 6/19/20at 14:52;  

Start 6/19/20 at 10:45;  Stop 6/19/20 at 10:49;  Status DC


Potassium Acetate 60 meq/Magnesium Sulfate 14 meq/ Multivitamins 10 ml/Chromium/

Copper/Manganese/ Seleni/Zn 1 ml/ Insulin Human Regular 15 unit/ Total 

Parenteral Nutrition/Amino Acids/Dextrose/ Fat Emulsion Intravenous 1,920 ml @  

80 mls/hr TPN  CONT IV  Last administered on 6/19/20at 22:08;  Start 6/19/20 at 

22:00;  Stop 6/20/20 at 21:59;  Status DC


Potassium Acetate 60 meq/Magnesium Sulfate 14 meq/ Multivitamins 10 ml/Chromium/

Copper/Manganese/ Seleni/Zn 1 ml/ Insulin Human Regular 15 unit/ Total 

Parenteral Nutrition/Amino Acids/Dextrose/ Fat Emulsion Intravenous 1,920 ml @  

80 mls/hr TPN  CONT IV  Last administered on 6/20/20at 22:12;  Start 6/20/20 at 

22:00;  Stop 6/21/20 at 21:59;  Status DC


Potassium Acetate 60 meq/Magnesium Sulfate 14 meq/ Multivitamins 10 ml/Chromium/

Copper/Manganese/ Seleni/Zn 1 ml/ Insulin Human Regular 15 unit/ Total 

Parenteral Nutrition/Amino Acids/Dextrose/ Fat Emulsion Intravenous 1,920 ml @  

80 mls/hr TPN  CONT IV  Last administered on 6/21/20at 22:22;  Start 6/21/20 at 

22:00;  Stop 6/22/20 at 21:59





Active Scripts


Active


Reported


Bisoprolol Fumarate 5 Mg Tablet 10 Mg PO DAILY


Vitals/I & O





Vital Sign - Last 24 Hours








 6/21/20 6/21/20 6/21/20 6/21/20





 10:00 11:31 12:00 12:00


 


Temp    98.2





    98.2


 


Pulse 121   116


 


Resp 21   19


 


B/P (MAP) 102/51 (68)   91/47 (62)


 


Pulse Ox 98 96  98


 


O2 Delivery Ventilator Tracheal Collar Trach Collar Ventilator


 


O2 Flow Rate  5.0  


 


    





    





 6/21/20 6/21/20 6/21/20 6/21/20





 12:03 12:33 14:00 16:00


 


Temp    97.9





    97.9


 


Pulse   118 100


 


Resp   16 20


 


B/P (MAP)   114/72 (86) 118/75 (89)


 


Pulse Ox 96 98 98 100


 


O2 Delivery   Ventilator Ventilator


 


O2 Flow Rate 5.0 5.0  


 


    





    





 6/21/20 6/21/20 6/21/20 6/21/20





 16:00 16:51 17:00 18:00


 


Pulse   118 112


 


Resp   14 14


 


B/P (MAP)   100/61 (74) 101/63 (76)


 


Pulse Ox  96 98 99


 


O2 Delivery Trach Collar Tracheal Collar Ventilator Ventilator


 


O2 Flow Rate  5.0  





 6/21/20 6/21/20 6/21/20 6/21/20





 20:00 20:00 20:34 21:00


 


Temp 98.6   





 98.6   


 


Pulse 108   110


 


Resp 13   16


 


B/P (MAP) 82/42 (55)   86/44 (58)


 


Pulse Ox 99  99 99


 


O2 Delivery Tracheal Collar Trach Collar Tracheal Collar Tracheal Collar


 


O2 Flow Rate   8.0 


 


    





    





 6/21/20 6/21/20 6/21/20 6/22/20





 22:00 23:00 23:45 00:00


 


Pulse 108 106  


 


Resp 15 15  


 


B/P (MAP) 97/55 (69) 91/50 (64)  


 


Pulse Ox 99 99 98 


 


O2 Delivery Tracheal Collar Tracheal Collar Tracheal Collar Trach Collar


 


O2 Flow Rate   8.0 





 6/22/20 6/22/20 6/22/20 6/22/20





 00:00 01:00 02:00 03:00


 


Temp 98.5   





 98.5   


 


Pulse 110 118 114 114


 


Resp 20 26 22 31


 


B/P (MAP) 83/58 (66) 111/79 (90) 100/61 (74) 115/77 (90)


 


Pulse Ox 98 96 97 97


 


O2 Delivery Tracheal Collar Tracheal Collar Tracheal Collar Tracheal Collar


 


    





    





 6/22/20 6/22/20 6/22/20 6/22/20





 03:23 04:00 04:00 05:00


 


Temp   98.2 





   98.2 


 


Pulse   116 118


 


Resp   24 25


 


B/P (MAP)   109/61 (77) 116/66 (83)


 


Pulse Ox 98  95 95


 


O2 Delivery Tracheal Collar Trach Collar Tracheal Collar Tracheal Collar


 


O2 Flow Rate 8.0   


 


    





    





 6/22/20 6/22/20 6/22/20 6/22/20





 06:00 07:00 08:00 08:00


 


Temp   98.7 





   98.7 


 


Pulse 116 119 120 


 


Resp 23 23 26 


 


B/P (MAP) 108/66 (80) 112/70 (84) 112/69 (83) 


 


Pulse Ox 97 96 96 


 


O2 Delivery Tracheal Collar Tracheal Collar Tracheal Collar Trach Collar


 


    





    





 6/22/20 6/22/20  





 08:48 09:00  


 


Pulse  117  


 


Resp  22  


 


B/P (MAP)  119/67 (84)  


 


Pulse Ox 96 97  


 


O2 Delivery Tracheal Collar Tracheal Collar  


 


O2 Flow Rate 8.0   














Intake and Output   


 


 6/21/20 6/21/20 6/22/20





 15:00 23:00 07:00


 


Intake Total 315 ml 1462 ml 806 ml


 


Output Total 675 ml 1436 ml 975 ml


 


Balance -360 ml 26 ml -169 ml











Hemodynamically unstable?:  No


Is patient in severe pain?:  No


Is NPO status required?:  No











OVIDIO SARAVIA MD          Jun 22, 2020 09:52

## 2020-06-22 NOTE — RAD
Single AP view of the chest.

 

Comparison:  6/18/2020.

 

Indication: Follow-up effusions

 

Findings: 

 

Tracheostomy tube nasogastric tube and left-sided PICC line are 

identified. The heart is enlarged but stable.. No pneumothorax. Left 

basilar chest tube is reidentified. No significant effusion is seen on the

left side. There does appear to be increasing effusion on the right side 

with new atelectasis and effusion identified. Breast

 

Impression: 

 

1. Chest tube is seen in the left pleural base with no significant 

left-sided effusion. There does appear to be an increasing right-sided 

effusion with compressive atelectasis.

 

 

Electronically signed by: Gaurav Michel MD (6/22/2020 9:20 AM) [id number]

## 2020-06-23 VITALS — SYSTOLIC BLOOD PRESSURE: 114 MMHG | DIASTOLIC BLOOD PRESSURE: 67 MMHG

## 2020-06-23 VITALS — DIASTOLIC BLOOD PRESSURE: 57 MMHG | SYSTOLIC BLOOD PRESSURE: 112 MMHG

## 2020-06-23 VITALS — DIASTOLIC BLOOD PRESSURE: 74 MMHG | SYSTOLIC BLOOD PRESSURE: 112 MMHG

## 2020-06-23 VITALS — SYSTOLIC BLOOD PRESSURE: 114 MMHG | DIASTOLIC BLOOD PRESSURE: 66 MMHG

## 2020-06-23 VITALS — DIASTOLIC BLOOD PRESSURE: 72 MMHG | SYSTOLIC BLOOD PRESSURE: 116 MMHG

## 2020-06-23 VITALS — SYSTOLIC BLOOD PRESSURE: 113 MMHG | DIASTOLIC BLOOD PRESSURE: 80 MMHG

## 2020-06-23 VITALS — SYSTOLIC BLOOD PRESSURE: 114 MMHG | DIASTOLIC BLOOD PRESSURE: 81 MMHG

## 2020-06-23 VITALS — SYSTOLIC BLOOD PRESSURE: 113 MMHG | DIASTOLIC BLOOD PRESSURE: 71 MMHG

## 2020-06-23 VITALS — SYSTOLIC BLOOD PRESSURE: 105 MMHG | DIASTOLIC BLOOD PRESSURE: 66 MMHG

## 2020-06-23 VITALS — DIASTOLIC BLOOD PRESSURE: 64 MMHG | SYSTOLIC BLOOD PRESSURE: 110 MMHG

## 2020-06-23 VITALS — DIASTOLIC BLOOD PRESSURE: 61 MMHG | SYSTOLIC BLOOD PRESSURE: 109 MMHG

## 2020-06-23 VITALS — SYSTOLIC BLOOD PRESSURE: 125 MMHG | DIASTOLIC BLOOD PRESSURE: 81 MMHG

## 2020-06-23 VITALS — DIASTOLIC BLOOD PRESSURE: 57 MMHG | SYSTOLIC BLOOD PRESSURE: 109 MMHG

## 2020-06-23 VITALS — DIASTOLIC BLOOD PRESSURE: 71 MMHG | SYSTOLIC BLOOD PRESSURE: 95 MMHG

## 2020-06-23 VITALS — SYSTOLIC BLOOD PRESSURE: 113 MMHG | DIASTOLIC BLOOD PRESSURE: 57 MMHG

## 2020-06-23 VITALS — SYSTOLIC BLOOD PRESSURE: 113 MMHG | DIASTOLIC BLOOD PRESSURE: 72 MMHG

## 2020-06-23 VITALS — SYSTOLIC BLOOD PRESSURE: 112 MMHG | DIASTOLIC BLOOD PRESSURE: 57 MMHG

## 2020-06-23 VITALS — DIASTOLIC BLOOD PRESSURE: 93 MMHG | SYSTOLIC BLOOD PRESSURE: 130 MMHG

## 2020-06-23 VITALS — DIASTOLIC BLOOD PRESSURE: 61 MMHG | SYSTOLIC BLOOD PRESSURE: 106 MMHG

## 2020-06-23 VITALS — SYSTOLIC BLOOD PRESSURE: 119 MMHG | DIASTOLIC BLOOD PRESSURE: 81 MMHG

## 2020-06-23 VITALS — DIASTOLIC BLOOD PRESSURE: 90 MMHG | SYSTOLIC BLOOD PRESSURE: 140 MMHG

## 2020-06-23 VITALS — DIASTOLIC BLOOD PRESSURE: 56 MMHG | SYSTOLIC BLOOD PRESSURE: 94 MMHG

## 2020-06-23 LAB
ANION GAP SERPL CALC-SCNC: 4 MMOL/L (ref 6–14)
BUN SERPL-MCNC: 15 MG/DL (ref 7–20)
CALCIUM SERPL-MCNC: 10.1 MG/DL (ref 8.5–10.1)
CHLORIDE SERPL-SCNC: 99 MMOL/L (ref 98–107)
CO2 SERPL-SCNC: 33 MMOL/L (ref 21–32)
CREAT SERPL-MCNC: 0.7 MG/DL (ref 0.6–1)
GFR SERPLBLD BASED ON 1.73 SQ M-ARVRAT: 88.9 ML/MIN
GLUCOSE SERPL-MCNC: 109 MG/DL (ref 70–99)
POTASSIUM SERPL-SCNC: 4.3 MMOL/L (ref 3.5–5.1)
SODIUM SERPL-SCNC: 136 MMOL/L (ref 136–145)
TRIGL SERPL-MCNC: 174 MG/DL (ref 0–150)

## 2020-06-23 RX ADMIN — PANTOPRAZOLE SODIUM SCH MG: 40 INJECTION, POWDER, FOR SOLUTION INTRAVENOUS at 08:58

## 2020-06-23 RX ADMIN — DILTIAZEM HYDROCHLORIDE SCH MLS/HR: 5 INJECTION INTRAVENOUS at 08:58

## 2020-06-23 RX ADMIN — IPRATROPIUM BROMIDE AND ALBUTEROL SULFATE SCH ML: .5; 3 SOLUTION RESPIRATORY (INHALATION) at 12:27

## 2020-06-23 RX ADMIN — IPRATROPIUM BROMIDE AND ALBUTEROL SULFATE SCH ML: .5; 3 SOLUTION RESPIRATORY (INHALATION) at 15:26

## 2020-06-23 RX ADMIN — DILTIAZEM HYDROCHLORIDE SCH MLS/HR: 5 INJECTION INTRAVENOUS at 12:20

## 2020-06-23 RX ADMIN — IPRATROPIUM BROMIDE AND ALBUTEROL SULFATE SCH ML: .5; 3 SOLUTION RESPIRATORY (INHALATION) at 07:29

## 2020-06-23 RX ADMIN — INSULIN LISPRO SCH UNITS: 100 INJECTION, SOLUTION INTRAVENOUS; SUBCUTANEOUS at 12:00

## 2020-06-23 RX ADMIN — INSULIN LISPRO SCH UNITS: 100 INJECTION, SOLUTION INTRAVENOUS; SUBCUTANEOUS at 17:01

## 2020-06-23 RX ADMIN — INSULIN LISPRO SCH UNITS: 100 INJECTION, SOLUTION INTRAVENOUS; SUBCUTANEOUS at 06:00

## 2020-06-23 RX ADMIN — CIPROFLOXACIN SCH MLS/HR: 2 INJECTION, SOLUTION INTRAVENOUS at 21:18

## 2020-06-23 RX ADMIN — IPRATROPIUM BROMIDE AND ALBUTEROL SULFATE SCH ML: .5; 3 SOLUTION RESPIRATORY (INHALATION) at 23:30

## 2020-06-23 RX ADMIN — CIPROFLOXACIN SCH MLS/HR: 2 INJECTION, SOLUTION INTRAVENOUS at 08:58

## 2020-06-23 RX ADMIN — IPRATROPIUM BROMIDE AND ALBUTEROL SULFATE SCH ML: .5; 3 SOLUTION RESPIRATORY (INHALATION) at 03:25

## 2020-06-23 RX ADMIN — Medication PRN EACH: at 12:45

## 2020-06-23 RX ADMIN — DEXTRAN 70, GLYCERIN, HYPROMELLOSE PRN DROP: 1; 2; 3 SOLUTION/ DROPS OPHTHALMIC at 21:17

## 2020-06-23 RX ADMIN — DILTIAZEM HYDROCHLORIDE SCH MLS/HR: 5 INJECTION INTRAVENOUS at 21:18

## 2020-06-23 RX ADMIN — IPRATROPIUM BROMIDE AND ALBUTEROL SULFATE SCH ML: .5; 3 SOLUTION RESPIRATORY (INHALATION) at 19:56

## 2020-06-23 RX ADMIN — ONDANSETRON PRN MG: 2 INJECTION INTRAMUSCULAR; INTRAVENOUS at 10:32

## 2020-06-23 RX ADMIN — INSULIN LISPRO SCH UNITS: 100 INJECTION, SOLUTION INTRAVENOUS; SUBCUTANEOUS at 00:00

## 2020-06-23 NOTE — PDOC
Infectious Disease Note


Subjective


Subjective


Patient is awake says she is feeling better





ROS


ROS


No nausea vomiting diarrhea chest pain





Vital Sign


Vital Signs





Vital Signs








  Date Time  Temp Pulse Resp B/P (MAP) Pulse Ox O2 Delivery O2 Flow Rate FiO2


 


6/23/20 07:29     100 Tracheal Collar 8.0 


 


6/23/20 07:00  116 22 119/81 (94)    


 


6/23/20 04:07 98.2       





 98.2       











Physical Exam


PHYSICAL EXAM


GENERAL: Propped up in bed, resting quietly 


HEENT: NGT in place. 


NECK:  Tracheostomy 


LUNGS: Diminished aeration bases, no accessory muscle use - CT on left with 

clear fluid


HEART:  S1, S2,regular 


ABDOMEN: Mild distention, bowel sounds present, soft, grimaces to palpation, 

drains x 3


: [place] ( 6/7)


EXTREMITIES: + edema BLE


SKIN: no signs of gen rash 


LUE-PICC (5/29) without signs of complications





Labs


Lab





Laboratory Tests








Test


 6/22/20


11:17 6/22/20


17:15 6/23/20


06:28


 


Glucose (Fingerstick)


 183 mg/dL


(70-99) 149 mg/dL


(70-99) 101 mg/dL


(70-99)


 


Sodium Level


 


 


 136 mmol/L


(136-145)


 


Potassium Level


 


 


 4.3 mmol/L


(3.5-5.1)


 


Chloride Level


 


 


 99 mmol/L


()


 


Carbon Dioxide Level


 


 


 33 mmol/L


(21-32)


 


Anion Gap   4 (6-14) 


 


Blood Urea Nitrogen


 


 


 15 mg/dL


(7-20)


 


Creatinine


 


 


 0.7 mg/dL


(0.6-1.0)


 


Estimated GFR


(Cockcroft-Gault) 


 


 88.9 





 


Glucose Level


 


 


 109 mg/dL


(70-99)


 


Calcium Level


 


 


 10.1 mg/dL


(8.5-10.1)


 


Triglycerides Level


 


 


 174 mg/dL


(0-150)








Micro








Objective


Assessment


Fever intermittent could be from underlying pancreatitis vs aspiration 


? Ileus with vomiting


Abd distention - U/S and CT reviewed s/p 0.4 L of opaque, debris-containing 

ascites was removed 5/6


Acute pancreatitis with persistent necrosis


  - 4/27 status post ROBERT drain placement + C paropsilosis; 5/6 + yeast & high 

amylase; s/p additional drains on 5/8


Anemia - S/p PRBCs


Cholelithiasis with thickening of the gallbladder wall.


Leucocytosis improved 


JED, hyperkalemia, Metabolic acidosis off dialysis


Acute hypoxic resp failure ,bilateral pleural effusion and atelectasis


hypocalcemia 


Prediabetes


HTN


s/p trach





Plan


Plan of Care


Continue cipro 6/18. sputum from 6/13 - growing PSA - may be colonization (I to 

Meropenem), clinically stable from resp standpoint


Continue meropenem, has MDRO PSAE June 7 from abd


Continue micafungin June 7


Follow-up cultures fluid for yeast ID


Monitor labs/temp


General surgery following


Monitor drain output


Maintain aspiration precaution


Supportive care


Contact islation for CRE/MDRO





ST SID micro [phone]








Critically ill











LINN FRANZ MD               Jun 23, 2020 07:45

## 2020-06-23 NOTE — PDOC
PROGRESS NOTES


Chief Complaint


Chief Complaint


impression =================











History of Present Illness


48yo F w/ PMHx HTN, prediabetes who presented the emergency room complaints of 

abdominal pain. Patient described off and on 3 days. She states is constant, 

described as a squeezing sensation in a band-like distribution. + nausea, 

vomiting.  She denies any fever or diarrhea.  Patient denies any abdominal 

surgical procedures.  She stateed is worse with movements, car ride.  Pain 

initially was upper abdomen however now pretty much generalized.  Last bowel 

movement was 3/15/2020. Nothing makes her pain better.  Patient denies any 

shortness of breath.  She does state the pain moves into her chest.  Denies any 

headache or visual changes.


Lipase 95174, , , Bilirubin 1.4.


CT abdomen confirms pancreatic inflammation, peripancreatic fluid and 

inflammatory changes around the pancreas consistent with pancreatitis. 

Cholelithiasis and 1.4cm uterine fibroid as well as possible left salpingitis. 

Admitted for further care


Gallstone pancreatitis with necrosis. 


   -CT A/P 6/6 showed multiple pseudocysts, slight larger on the right. s/p 

drains x 3, 6/7.  + PSAE (MDRO-R Cefepime, Zosyn ANSON < 64) and yeast, 


   -s/p drain 4/27. C. parapsilosis. s/p drain 5/6 + yeast & high amylase; s/p 

additional drain on 5/8. Drains removed. 


Ascites s/p paracentesis 4/15 & 5/6. C. parapsilosis 


JED. off HD. 





impression ============================











Acute hypoxic Respiratory failure required  mechanical ventilation


Tracheostomy


bilateral pleural effusions/pulm edema s/p Throacentesis on 6/15/2020


Severe Acute gallstone pancreatitis (not a surgical candidate at this time) with

necrosis


Acute kidney failure now requiring dialysis


Gallstones (Calculus of gallbladder with acute cholecystitis without 

obstruction)


HTN 


Intractable pain


Intractable nausea


Covid 19 negative. 


Acute on chronic anemia 


EEG: No seizure activityFever  - better currently - intermittent could be from 

underlying pancreatitis blood cults 5/4 - neg so far


? Ileus with vomiting


Abd distention - U/S and CT reviewed s/p 0.4 L of opaque, debris-containing 

ascites was removed 5/6


Acute pancreatitis with persistent necrosis


Gallstone pancreatitis with necrosis. 


   -CT A/P 6/6 showed multiple pseudocysts, slight larger on the right. s/p 

drains x 3, 6/7.  + PSAE (MDRO-R Cefepime, Zosyn ANSON < 64) and yeast, 


   -s/p drain 4/27. C. parapsilosis. s/p drain 5/6 + yeast & high amylase; s/p 

additional drain on 5/8. Drains removed. 


Ascites s/p paracentesis 4/15 & 5/6. C. parapsilosis 


JED. off HD. 


A large fluid collection in the pancreatic bed has slightly decreased in size, 

described below, the pancreas itself is difficult to  visualize, which could be 

due to necrosis or obscuration of pancreatic  parenchyma from the surrounding 

fluid collection.6/15 


- 4/27 status post ROBERT drain placement + C paropsilosis. s/p additional drains 

5/8


Anemia - S/p PRBCs


Cholelithiasis with thickening of the gallbladder wall.


Leucocytosis improving


JED, hyperkalemia, Metabolic acidosis off dialysis


hypocalcemia 


Prediabetes


HTN


s/p trach


ESRD on HD


Hyperglycemia


severe protein-caloric malnutrition


Moderate to large left pleural effusion with atelectasis and collapse  of most 

of the left lower lobe, stable








6/18 FEVER 101, BLOOD CULTURE X 2 


6/19  iv cipro added


6/20  vent fio2 35% 





6/23 tentative surgery next week 











FEN - 





PPX - SCDs, off lovenox currently, high risk for thrombosis but in light of her 

recent anemia and need for transfusion the risks do not outweigh benefits at 

this time. will continue to evaluate on a daily basis


FULL CODE


Dispo - ICU, critically ill


BACK ON VENT 6/17  vent // no distress


iv albumin 6/19


Continue meropenem, has MDRO PSAE June 7 


Continue micafungin June 7 6/23 surgery plans with Dr Flores--tentatively next week














36 MIN CC TIME





History of Present Illness


History of Present Illness


6/8: IR placed drain on 6/7. 4u PRBC after Hb drop. Hb 8.8 today. Off Levophed 

this morning. T-max 100.3. Much more lethargic today. CXR with left sided 

diffuse infiltrates.


6/9: Tachycardic overnight into the 140s. NGT clamped. On BIPAP currently. 

Drains with serosanguinous discharge. WBC 8, Tmax 99.6F.


6/10: Seen on trach shield in ICU.  Hypertensive and tachycardic. Labs stable. 

blood stained drainage from drains. Afebrile.


6/11: Seen on trach shield in ICU. She is a bit confused, drowsy, but when 

sitting up is conversational and confusion somewhat clears. She is asking for 

more pain medication. Stable drains, still very tachy.Na 147


6/12: Patient vomited overnight. Aspirated. Tried to pull her trach out, she was

told she would die without her trach, she said "I know, I just want to go home".

Hb 7.6. Afebrile, still very tachycardic. 1055ml out of right sided ROBERT drain


6/13: Overnight hypoxic, on BIPAP. CXR with left sided white out lung. Signif

icant mucous plug suctioned by RT with improvement in her ABG after 2 hours this

morning. Not really active, tired, lethargic. 890ml out of drains past 24 hours.

On vent. D/w daughter bedside.


6/14: Still on vent overnight with copious pulmonary drainage on suctioning. 

Labs stable, UOP stable 1L. Drain output still significant with 1505mL. She has 

shown her phone password [id number] and made it clear she does not want her daughters 

to access her phone at this time.


6:15: Patient quite frail, she has had such a lengthy hospital stay that she is 

certainly depressed and as documented 3 days ago is wanting to go home. Most 

likely patient is also tired of being hospitalized with an end point no where in

sight. Reassurance and encouragement provided during my visit. Plans for 

thoracentesis later in the day 


6/16: No acute events reported overnight, case discussed with nursing staff 

patient in no acute distress, complaints of the abdomimal pain during my visit, 

patient is quite depressed, reassurance has been provided, discussed with 

nursing staff at bedside, replace electrolytes 


6/17 off vent / on TS , no distress








6/23 /2020


Patient seen and examined ICU BED


critically ill 


guardedlong-term prognosis 


Sputum from 6/13 - growing PSA - may be colonization I to Meropenem add Cipro


Continue meropenem, has MDRP PSAE June 7 from abd


cont micafungin June 7


bleeding from Sx. site RLQ and firmness)


max 1003 


oncology consulted


COMMENTS: Has specimen been collected/obtained? Y                     


  


      











36 MIN CC TIME








Date:  Apr 27, 2020





Pre-Op Diagnosis:


Necrotizing pancreatitis





Post-Op Diagnosis:


same





Procedure Performed:


laparoscopic exploration





Surgeon:


Frandy Flores


Asst:  Dr. Luis Santos





Anesthesia Type:


GETA plus local





Blood Loss:


50





Specimans Obtained:


cultures, debris





Findings:


1000 cc ascites suctioned off, cultures sent, diffuse debris, obliteration of 

surgical planes preventing any meaningful exploration




















 


 








6/2/2020


Patient seen and examined in the ICU


She is a little more alert today but still quite ill


Appears clammy and pale and depressed/anxious


Discussed with RN


Chart reviewed











6/1/2020


Patient seen and examined in the ICU


She appears extremely ill


She is tachypneic at 35 respirations per minute and tachycardic at 132 bpm


She is extremely encephalopathic and shaky


She appears clammy


Chart reviewed


Discussed with RN


Prognosis extremely guarded at best








5/31/2020


Patient still in ICU


Resting with no apparent distress


Chart reviewed








5/30/2020


Patient seen and examined in the ICU


She is wiping her face with a cough


Discussed with RN


Chart reviewed


We hope to get her out of the ICU later today if possible








5/29/2020


Patient seen and examined in the ICU once again


She is back on NG suction


On IV Zosyn


Has IV TPN


Sedated with Precedex but anxious still


Appears somewhat clammy and pale


Chart reviewed


Discussed with RN


She remains critically ill











 


 


BRIEF OPERATIVE NOTE


Pre-Op Diagnosis


Pancreatitis with pseudocysts, suspected infection


Post-Op Diagnosis


same


Procedure Performed


CT abdominal Drains x 3


Surgeon


Hardy


Anesthesia Type:  Conscious Sedation


Findings


3 abdominal drains, 14F, with turbid pancreatic fluid and necrotic debris in 

each.


Complications


No immediate








5/9: Patient today somewhat restless and having bilious secretions from ET tube,

imaging studies ordered, discussed with consultant. Pretty poor prognosis, 

hopefully is not a fistula, poor surgical candidate. 





5/10: Imaging with no acute events, she seems more stable today compared to 

yesterday. Encouraged as much activity as possible patient at high risk for 

severe depression.





Vitals


Vitals





Vital Signs








  Date Time  Temp Pulse Resp B/P (MAP) Pulse Ox O2 Delivery O2 Flow Rate FiO2


 


6/23/20 10:02  110 22 95/71 (79) 98 Tracheal Collar  


 


6/23/20 08:00 99.2       





 99.2       


 


6/23/20 07:29       8.0 











Physical Exam


Physical Exam


GENERAL: Propped up in bed, resting quietly 


HEENT: NGT in place. 


NECK:  Tracheostomy 


LUNGS: Diminished aeration bases, no accessory muscle use - CT on left with 

clear fluid


HEART:  S1, S2,regular 


ABDOMEN: Mild distention, bowel sounds present, soft, grimaces to palpation, 

drains x 3


: [place] ( 6/7)


EXTREMITIES: + edema BLE


SKIN: no signs of gen rash 


LUE-PICC (5/29) without signs of complications


General:  Alert, Oriented X3, Cooperative, No acute distress


Heart:  Regular rate (SR/ST), Other (distant heart sounds)


Lungs:  Other (diminshed in bases, Rhonci in LLL)


Abdomen:  Soft


Extremities:  No cyanosis, Other (3+ bilateral LE pitting edema)


Skin:  No rashes, No significant lesion





Labs


LABS


 COMMENTS: Has specimen been collected/obtained? Y                     


 

--------------------------------------------------------------------------------


------------





  Procedure                         Result                                      

         


--------------------------------------------------------------

------------------------------





  GRAM STAIN EVALUATION  Final  


        Final





        GRAM NEG COCCOBACILLI:MANY


        SQUAMOUS EPI CELL:RARE


        PMN (WBCs):FEW


        Unless otherwise specified, Testing Performed by:


        [address] [address]


        For Inquires, the Physician may contact the Microbiology


        department at [phone]





  RESPIRATORY CULTURE  Final  


        Final





        MANY GRAM NEGATIVE RODS on 06/15/20 at 1107


        FINAL ID= [[id number]]


        MICRO CHARGES


        PSEUDOMONAS AERUGINOSA





  ANTIMICROBIAL SUSCEPTIBILITY  Final  


        Comment





        NEG ANSON 56


        PSEUDOMONAS AERUGINOSA


        ANTIBIOTIC                        RESULT          INTERPRETATION


        AMIKACIN                          <=16                  S


        AZTREONAM                         <=4                   S


        CEFTAZIDIME                       <=1                   S


        CIPROFLOXACIN                     <=0.25                S


        CEFEPIME                          <=2                   S


        CEFTAZIDIME/AVIBACTAM             <=4                   S


        GENTAMICIN                        <=2                   S


        LEVOFLOXACIN                      <=0.5                 S


Indication: Follow-up effusions


 


Findings: 


 


Tracheostomy tube nasogastric tube and left-sided PICC line are 


identified. The heart is enlarged but stable.. No pneumothorax. Left 


basilar chest tube is reidentified. No significant effusion is seen on the


left side. There does appear to be increasing effusion on the right side 


with new atelectasis and effusion identified. Breast


 


Impression: 


 


1. Chest tube is seen in the left pleural base with no significant 


left-sided effusion. There does appear to be an increasing right-sided 


effusion with compressive atelectasis.


 


 


Electronically signed by: Gaurav Michel MD (6/22/2020 9:20 AM) [id number]





Laboratory Tests








Test


 6/22/20


11:17 6/22/20


17:15 6/23/20


06:28


 


Glucose (Fingerstick)


 183 mg/dL


(70-99) 149 mg/dL


(70-99) 101 mg/dL


(70-99)


 


Sodium Level


 


 


 136 mmol/L


(136-145)


 


Potassium Level


 


 


 4.3 mmol/L


(3.5-5.1)


 


Chloride Level


 


 


 99 mmol/L


()


 


Carbon Dioxide Level


 


 


 33 mmol/L


(21-32)


 


Anion Gap   4 (6-14) 


 


Blood Urea Nitrogen


 


 


 15 mg/dL


(7-20)


 


Creatinine


 


 


 0.7 mg/dL


(0.6-1.0)


 


Estimated GFR


(Cockcroft-Gault) 


 


 88.9 





 


Glucose Level


 


 


 109 mg/dL


(70-99)


 


Calcium Level


 


 


 10.1 mg/dL


(8.5-10.1)


 


Triglycerides Level


 


 


 174 mg/dL


(0-150)











Assessment and Plan


Assessmemt and Plan


Problems


Medical Problems:


(1) Acute pancreatitis


Status: Acute  





(2) Cholelithiasis


Status: Acute  











Comment


Review of Relevant


I have reviewed the following items josy (where applicable) has been applied.


Labs





Laboratory Tests








Test


 6/21/20


11:51 6/21/20


18:25 6/22/20


01:12 6/22/20


11:17


 


Glucose (Fingerstick)


 171 mg/dL


(70-99) 140 mg/dL


(70-99) 133 mg/dL


(70-99) 183 mg/dL


(70-99)


 


Test


 6/22/20


17:15 6/23/20


06:28 


 





 


Glucose (Fingerstick)


 149 mg/dL


(70-99) 101 mg/dL


(70-99) 


 





 


Sodium Level


 


 136 mmol/L


(136-145) 


 





 


Potassium Level


 


 4.3 mmol/L


(3.5-5.1) 


 





 


Chloride Level


 


 99 mmol/L


() 


 





 


Carbon Dioxide Level


 


 33 mmol/L


(21-32) 


 





 


Anion Gap  4 (6-14)   


 


Blood Urea Nitrogen


 


 15 mg/dL


(7-20) 


 





 


Creatinine


 


 0.7 mg/dL


(0.6-1.0) 


 





 


Estimated GFR


(Cockcroft-Gault) 


 88.9 


 


 





 


Glucose Level


 


 109 mg/dL


(70-99) 


 





 


Calcium Level


 


 10.1 mg/dL


(8.5-10.1) 


 





 


Triglycerides Level


 


 174 mg/dL


(0-150) 


 











Laboratory Tests








Test


 6/22/20


11:17 6/22/20


17:15 6/23/20


06:28


 


Glucose (Fingerstick)


 183 mg/dL


(70-99) 149 mg/dL


(70-99) 101 mg/dL


(70-99)


 


Sodium Level


 


 


 136 mmol/L


(136-145)


 


Potassium Level


 


 


 4.3 mmol/L


(3.5-5.1)


 


Chloride Level


 


 


 99 mmol/L


()


 


Carbon Dioxide Level


 


 


 33 mmol/L


(21-32)


 


Anion Gap   4 (6-14) 


 


Blood Urea Nitrogen


 


 


 15 mg/dL


(7-20)


 


Creatinine


 


 


 0.7 mg/dL


(0.6-1.0)


 


Estimated GFR


(Cockcroft-Gault) 


 


 88.9 





 


Glucose Level


 


 


 109 mg/dL


(70-99)


 


Calcium Level


 


 


 10.1 mg/dL


(8.5-10.1)


 


Triglycerides Level


 


 


 174 mg/dL


(0-150)








Microbiology


6/18/20 Blood Culture - Preliminary, Resulted


          NO GROWTH AFTER 4 DAYS


6/15/20 Gram Stain - Final, Complete


          


6/15/20 Aerobic and Anaerobic Culture - Final, Complete


          


6/13/20 Gram Stain Evaluation - Final, Complete


          


6/13/20 Respiratory Culture - Final, Complete


          


6/13/20 Antimicrobic Susceptibility - Final, Complete


          


6/7/20 Urine Culture - Final, Complete


         


5/30/20 Gram Stain - Final, Complete


          


5/30/20 Aerobic Culture - Final, Complete


Medications





Current Medications


Sodium Chloride 1,000 ml @  1,000 mls/hr Q1H IV  Last administered on 3/16/20at 

03:00;  Start 3/16/20 at 03:00;  Stop 3/16/20 at 03:59;  Status DC


Ondansetron HCl (Zofran) 4 mg 1X  ONCE IVP  Last administered on 3/16/20at 

03:27;  Start 3/16/20 at 03:00;  Stop 3/16/20 at 03:01;  Status DC


Morphine Sulfate (Morphine Sulfate) 4 mg 1X  ONCE IV ;  Start 3/16/20 at 03:00; 

Stop 3/16/20 at 03:01;  Status Cancel


Ketorolac Tromethamine (Toradol 30mg Vial) 30 mg 1X  ONCE IV  Last administered 

on 3/16/20at 02:54;  Start 3/16/20 at 03:00;  Stop 3/16/20 at 03:01;  Status DC


Fentanyl Citrate (Fentanyl 2ml Vial) 25 mcg 1X  ONCE IVP  Last administered on 

3/16/20at 03:23;  Start 3/16/20 at 03:30;  Stop 3/16/20 at 03:31;  Status DC


Fentanyl Citrate (Fentanyl 2ml Vial) 100 mcg STK-MED ONCE .ROUTE ;  Start 

3/16/20 at 03:18;  Stop 3/16/20 at 03:18;  Status DC


Iohexol (Omnipaque 350 Mg/ml) 90 ml 1X  ONCE IV  Last administered on 3/16/20at 

03:25;  Start 3/16/20 at 03:30;  Stop 3/16/20 at 03:31;  Status DC


Info (CONTRAST GIVEN -- Rx MONITORING) 1 each PRN DAILY  PRN MC SEE COMMENTS;  

Start 3/16/20 at 03:30;  Stop 3/18/20 at 03:29;  Status DC


Hydromorphone HCl (Dilaudid) 0.5 mg 1X  ONCE IV  Last administered on 3/16/20at 

03:55;  Start 3/16/20 at 04:30;  Stop 3/16/20 at 04:32;  Status DC


Ondansetron HCl (Zofran) 4 mg PRN Q8HRS  PRN IV NAUSEA/VOMITING 1ST CHOICE;  

Start 3/16/20 at 05:00;  Stop 3/16/20 at 09:27;  Status DC


Morphine Sulfate (Morphine Sulfate) 2 mg PRN Q2HR  PRN IV SEVERE PAIN 7-10 Last 

administered on 3/17/20at 12:26;  Start 3/16/20 at 05:00;  Stop 3/17/20 at 

14:15;  Status DC


Sodium Chloride 1,000 ml @  125 mls/hr Q8H IV  Last administered on 3/16/20at 

20:56;  Start 3/16/20 at 05:00;  Stop 3/17/20 at 04:59;  Status DC


Hydromorphone HCl (Dilaudid) 0.5 mg PRN Q3HRS  PRN IV SEVERE PAIN 7-10 Last 

administered on 3/17/20at 10:06;  Start 3/16/20 at 05:00;  Stop 3/17/20 at 

12:01;  Status DC


Piperacillin Sod/ Tazobactam Sod 4.5 gm/Sodium Chloride 100 ml @  200 mls/hr 1X 

ONCE IV  Last administered on 3/16/20at 05:44;  Start 3/16/20 at 06:00;  Stop 

3/16/20 at 06:29;  Status DC


Ondansetron HCl (Zofran) 4 mg PRN Q4HRS  PRN IV NAUSEA/VOMITING 1ST CHOICE Last 

administered on 6/22/20at 12:23;  Start 3/16/20 at 09:30


Insulin Human Lispro (HumaLOG) 0-9 UNITS Q6HRS SQ  Last administered on 6/ 22/20at 11:19;  Start 3/16/20 at 09:30


Dextrose (Dextrose 50%-Water Syringe) 12.5 gm PRN Q15MIN  PRN IV SEE COMMENTS;  

Start 3/16/20 at 09:30


Pantoprazole Sodium (PROTONIX VIAL for IV PUSH) 40 mg DAILYAC IVP  Last 

administered on 6/23/20at 08:58;  Start 3/16/20 at 11:30


Prochlorperazine Edisylate (Compazine) 10 mg PRN Q6HRS  PRN IV NAUSEA/VOMITING, 

2nd CHOICE Last administered on 6/20/20at 23:50;  Start 3/16/20 at 17:45


Atenolol (Tenormin) 100 mg DAILY PO ;  Start 3/17/20 at 09:00;  Stop 3/16/20 at 

20:08;  Status DC


Metoprolol Tartrate (Lopressor Vial) 2.5 mg Q6HRS IVP  Last administered on 

3/17/20at 05:51;  Start 3/16/20 at 20:15;  Stop 3/17/20 at 10:02;  Status DC


Metoprolol Tartrate (Lopressor Vial) 5 mg Q6HRS IVP  Last administered on 

3/26/20at 00:12;  Start 3/17/20 at 10:15;  Stop 3/28/20 at 08:48;  Status DC


Hydromorphone HCl (Dilaudid) 1 mg PRN Q3HRS  PRN IV SEVERE PAIN 7-10 Last 

administered on 3/23/20at 05:13;  Start 3/17/20 at 12:00;  Stop 3/31/20 at 

00:25;  Status DC


Lidocaine HCl (Buffered Lidocaine 1%) 3 ml STK-MED ONCE .ROUTE ;  Start 3/17/20 

at 12:55;  Stop 3/17/20 at 12:56;  Status DC


Albumin Human 500 ml @  125 mls/hr 1X  ONCE IV  Last administered on 3/17/20at 

14:33;  Start 3/17/20 at 14:30;  Stop 3/17/20 at 18:32;  Status DC


Norepinephrine Bitartrate 8 mg/ Dextrose 258 ml @  17.299 mls/ hr CONT  PRN IV 

PER PROTOCOL Last administered on 4/14/20at 12:48;  Start 3/17/20 at 15:30;  

Stop 4/17/20 at 09:19;  Status DC


Sodium Chloride 1,000 ml @  125 mls/hr Q8H IV  Last administered on 3/17/20at 

21:04;  Start 3/17/20 at 16:00;  Stop 3/18/20 at 02:42;  Status DC


Albumin Human 500 ml @  125 mls/hr PRN BID  PRN IV After every 2L NSS & BP < 

90mm Last administered on 6/6/20at 11:40;  Start 3/17/20 at 16:00


Iohexol (Omnipaque 300 Mg/ml) 60 ml 1X  ONCE IV  Last administered on 3/17/20at 

17:20;  Start 3/17/20 at 17:00;  Stop 3/17/20 at 17:01;  Status DC


Info (CONTRAST GIVEN -- Rx MONITORING) 1 each PRN DAILY  PRN MC SEE COMMENTS;  

Start 3/17/20 at 17:00;  Stop 3/19/20 at 16:59;  Status DC


Meropenem 1 gm/ Sodium Chloride 100 ml @  200 mls/hr Q8HRS IV  Last administered

on 3/18/20at 05:45;  Start 3/17/20 at 20:00;  Stop 3/18/20 at 08:48;  Status DC


Furosemide (Lasix) 40 mg 1X  ONCE IVP  Last administered on 3/17/20at 22:12;  

Start 3/17/20 at 22:30;  Stop 3/17/20 at 22:31;  Status DC


Calcium Chloride 1000 mg/Sodium Chloride 110 ml @  220 mls/hr 1X  ONCE IV  Last 

administered on 3/17/20at 22:11;  Start 3/17/20 at 22:30;  Stop 3/17/20 at 

22:59;  Status DC


Albuterol Sulfate (Ventolin Neb Soln) 2.5 mg 1X  ONCE NEB  Last administered on 

3/18/20at 00:56;  Start 3/17/20 at 22:30;  Stop 3/17/20 at 22:31;  Status DC


Insulin Human Regular (HumuLIN R VIAL) 5 unit 1X  ONCE IV  Last administered on 

3/17/20at 22:14;  Start 3/17/20 at 22:30;  Stop 3/17/20 at 22:31;  Status DC


Magnesium Sulfate 50 ml @ 25 mls/hr 1X  ONCE IV  Last administered on 3/18/20at 

02:57;  Start 3/18/20 at 03:00;  Stop 3/18/20 at 04:59;  Status DC


Calcium Gluconate 1000 mg/Sodium Chloride 110 ml @  220 mls/hr 1X  ONCE IV  Last

administered on 3/18/20at 02:46;  Start 3/18/20 at 03:00;  Stop 3/18/20 at 

03:29;  Status DC


Sodium Chloride 1,000 ml @  200 mls/hr Q5H IV  Last administered on 3/18/20at 

02:46;  Start 3/18/20 at 03:00;  Stop 3/18/20 at 10:21;  Status DC


Calcium Gluconate 1000 mg/Sodium Chloride 110 ml @  220 mls/hr 1X  ONCE IV  Last

administered on 3/18/20at 03:21;  Start 3/18/20 at 03:30;  Stop 3/18/20 at 

03:59;  Status DC


Sodium Bicarbonate 50 meq/Sodium Chloride 1,050 ml @  75 mls/hr Q14H IV  Last 

administered on 3/22/20at 21:10;  Start 3/18/20 at 07:30;  Stop 3/23/20 at 

10:28;  Status DC


Calcium Gluconate 2000 mg/Sodium Chloride 120 ml @  220 mls/hr 1X  ONCE IV  Last

administered on 3/18/20at 09:05;  Start 3/18/20 at 07:30;  Stop 3/18/20 at 

08:02;  Status DC


Lidocaine HCl (Xylocaine-Mpf 1% 2ml Vial) 2 ml STK-MED ONCE .ROUTE ;  Start 

3/18/20 at 08:47;  Stop 3/18/20 at 08:47;  Status DC


Meropenem 500 mg/ Sodium Chloride 50 ml @  100 mls/hr Q12HR IV  Last 

administered on 3/23/20at 21:01;  Start 3/18/20 at 18:00;  Stop 3/24/20 at 

07:58;  Status DC


Lidocaine HCl (Buffered Lidocaine 1%) 3 ml STK-MED ONCE .ROUTE ;  Start 3/18/20 

at 09:46;  Stop 3/18/20 at 09:46;  Status DC


Lidocaine HCl (Buffered Lidocaine 1%) 6 ml 1X  ONCE INJ  Last administered on 

3/18/20at 10:26;  Start 3/18/20 at 10:15;  Stop 3/18/20 at 10:16;  Status DC


Info (Tpn Per Pharmacy) 1 each PRN DAILY  PRN MC SEE COMMENTS Last administered 

on 6/22/20at 11:59;  Start 3/18/20 at 12:00


Sodium Chloride 1,000 ml @  1,000 mls/hr Q1H PRN IV hypotension;  Start 3/18/20 

at 12:07;  Stop 3/18/20 at 18:06;  Status DC


Diphenhydramine HCl (Benadryl) 25 mg 1X PRN  PRN IV ITCHING;  Start 3/18/20 at 

12:15;  Stop 3/19/20 at 12:14;  Status DC


Diphenhydramine HCl (Benadryl) 25 mg 1X PRN  PRN IV ITCHING;  Start 3/18/20 at 

12:15;  Stop 3/19/20 at 12:14;  Status DC


Sodium Chloride 1,000 ml @  400 mls/hr Q2H30M PRN IV PATENCY;  Start 3/18/20 at 

12:07;  Stop 3/19/20 at 00:06;  Status DC


Info (PHARMACY MONITORING -- do not chart) 1 each PRN DAILY  PRN MC SEE 

COMMENTS;  Start 3/18/20 at 12:15;  Stop 3/20/20 at 08:13;  Status DC


Sodium Chloride 90 meq/Calcium Gluconate 10 meq/ Multivitamins 10 ml/Chromium/ 

Copper/Manganese/ Seleni/Zn 1 ml/ Total Parenteral Nutrition/Amino 

Acids/Dextrose/ Fat Emulsion Intravenous 55.005 ml  @ 2.292 mls/hr TPN  CONT IV 

;  Start 3/18/20 at 22:00;  Stop 3/18/20 at 12:33;  Status DC


Info (Tpn Per Pharmacy) 1 each PRN DAILY  PRN MC SEE COMMENTS;  Start 3/18/20 at

12:30;  Status UNV


Sodium Chloride 90 meq/Calcium Gluconate 10 meq/ Multivitamins 10 ml/Chromium/ 

Copper/Manganese/ Seleni/Zn 0.5 ml/ Total Parenteral Nutrition/Amino 

Acids/Dextrose/ Fat Emulsion Intravenous 1,512 ml @  63 mls/hr TPN  CONT IV  

Last administered on 3/18/20at 22:06;  Start 3/18/20 at 22:00;  Stop 3/19/20 at 

21:59;  Status DC


Calcium Carbonate/ Glycine (Tums) 500 mg PRN AFTMEALHC  PRN PO INDIGESTION;  

Start 3/18/20 at 17:45;  Stop 5/13/20 at 10:25;  Status DC


Calcium Gluconate (Calcium Gluconate) 2,000 mg 1X  ONCE IVP  Last administered 

on 3/19/20at 02:19;  Start 3/19/20 at 02:15;  Stop 3/19/20 at 02:16;  Status DC


Calcium Chloride 3000 mg/Sodium Chloride 1,030 ml @  50 mls/hr [id number] IV  Last 

administered on 3/21/20at 02:17;  Start 3/19/20 at 08:00;  Stop 3/21/20 at 

15:23;  Status DC


Lorazepam (Ativan Inj) 1 mg PRN Q4HRS  PRN IVP ANXIETY / AGITATION, 2nd choic 

Last administered on 4/17/20at 03:51;  Start 3/19/20 at 09:00;  Stop 4/17/20 at 

09:19;  Status DC


Sodium Chloride 1,000 ml @  1,000 mls/hr Q1H PRN IV hypotension;  Start 3/19/20 

at 08:56;  Stop 3/19/20 at 14:55;  Status DC


Albumin Human 200 ml @  200 mls/hr 1X PRN  PRN IV Hypotension;  Start 3/19/20 at

09:00;  Stop 3/19/20 at 14:59;  Status DC


Diphenhydramine HCl (Benadryl) 25 mg 1X PRN  PRN IV ITCHING;  Start 3/19/20 at 

09:00;  Stop 3/20/20 at 08:59;  Status DC


Diphenhydramine HCl (Benadryl) 25 mg 1X PRN  PRN IV ITCHING;  Start 3/19/20 at 

09:00;  Stop 3/20/20 at 08:59;  Status DC


Sodium Chloride 1,000 ml @  400 mls/hr Q2H30M PRN IV PATENCY;  Start 3/19/20 at 

08:56;  Stop 3/19/20 at 20:55;  Status DC


Info (PHARMACY MONITORING -- do not chart) 1 each PRN DAILY  PRN MC SEE MIKEY DISLA;  Start 3/19/20 at 09:00;  Status UNV


Info (PHARMACY MONITORING -- do not chart) 1 each PRN DAILY  PRN MC SEE 

COMMENTS;  Start 3/19/20 at 09:00;  Stop 3/20/20 at 08:13;  Status DC


Digoxin (Lanoxin) 500 mcg 1X  ONCE IV  Last administered on 3/19/20at 10:04;  

Start 3/19/20 at 10:00;  Stop 3/19/20 at 10:01;  Status DC


Digoxin (Lanoxin) 125 mcg 1X  ONCE IV  Last administered on 3/19/20at 17:10;  

Start 3/19/20 at 18:00;  Stop 3/19/20 at 18:01;  Status DC


Magnesium Sulfate 100 ml @  25 mls/hr 1X  ONCE IV  Last administered on 

3/19/20at 12:48;  Start 3/19/20 at 13:00;  Stop 3/19/20 at 16:59;  Status DC


Sodium Chloride 90 meq/Magnesium Sulfate 10 meq/ Calcium Gluconate 20 meq/ 

Multivitamins 10 ml/Chromium/ Copper/Manganese/ Seleni/Zn 0.5 ml/ Total 

Parenteral Nutrition/Amino Acids/Dextrose/ Fat Emulsion Intravenous 1,512 ml @  

63 mls/hr TPN  CONT IV  Last administered on 3/19/20at 22:25;  Start 3/19/20 at 

22:00;  Stop 3/20/20 at 21:59;  Status DC


Sodium Chloride 1,000 ml @  1,000 mls/hr Q1H PRN IV hypotension;  Start 3/20/20 

at 08:05;  Stop 3/20/20 at 14:04;  Status DC


Albumin Human 200 ml @  200 mls/hr 1X  ONCE IV  Last administered on 3/20/20at 

08:57;  Start 3/20/20 at 08:15;  Stop 3/20/20 at 09:14;  Status DC


Diphenhydramine HCl (Benadryl) 25 mg 1X PRN  PRN IV ITCHING;  Start 3/20/20 at 

08:15;  Stop 3/21/20 at 08:14;  Status DC


Diphenhydramine HCl (Benadryl) 25 mg 1X PRN  PRN IV ITCHING;  Start 3/20/20 at 

08:15;  Stop 3/21/20 at 08:14;  Status DC


Sodium Chloride 1,000 ml @  400 mls/hr Q2H30M PRN IV PATENCY;  Start 3/20/20 at 

08:05;  Stop 3/20/20 at 20:04;  Status DC


Info (PHARMACY MONITORING -- do not chart) 1 each PRN DAILY  PRN MC SEE CO

MMENTS;  Start 3/20/20 at 08:15;  Stop 3/24/20 at 07:57;  Status DC


Sodium Chloride 90 meq/Potassium Chloride 15 meq/ Potassium Phosphate 10 mmol/ 

Magnesium Sulfate 10 meq/Calcium Gluconate 20 meq/ Multivitamins 10 ml/Chromium/

Copper/Manganese/ Seleni/Zn 0.5 ml/ Total Parenteral Nutrition/Amino 

Acids/Dextrose/ Fat Emulsion Intravenous 1,512 ml @  63 mls/hr TPN  CONT IV  

Last administered on 3/20/20at 21:01;  Start 3/20/20 at 22:00;  Stop 3/21/20 at 

21:59;  Status DC


Potassium Chloride/Water 100 ml @  100 mls/hr 1X  ONCE IV  Last administered on 

3/20/20at 14:09;  Start 3/20/20 at 14:00;  Stop 3/20/20 at 14:59;  Status DC


Benzocaine (Hurricaine One) 1 spray 1X  ONCE MM  Last administered on 3/20/20at 

16:38;  Start 3/20/20 at 14:30;  Stop 3/20/20 at 14:31;  Status DC


Lidocaine HCl (Glydo (Lidocaine) Jelly) 1 thomas 1X  ONCE MM  Last administered on 

3/20/20at 16:38;  Start 3/20/20 at 14:30;  Stop 3/20/20 at 14:31;  Status DC


Linezolid/Dextrose 300 ml @  300 mls/hr Q12HR IV  Last administered on 3/26/20at

21:04;  Start 3/20/20 at 20:00;  Stop 3/27/20 at 07:50;  Status DC


Acetaminophen (Tylenol) 650 mg PRN Q6HRS  PRN PO MILD PAIN / TEMP;  Start 

3/21/20 at 03:30;  Stop 3/21/20 at 03:36;  Status DC


Acetaminophen (Tylenol) 650 mg PRN Q6HRS  PRN PEG MILD PAIN / TEMP Last 

administered on 4/16/20at 19:56;  Start 3/21/20 at 03:36;  Stop 5/13/20 at 

10:25;  Status DC


Sodium Chloride 1,000 ml @  1,000 mls/hr Q1H PRN IV hypotension;  Start 3/21/20 

at 07:50;  Stop 3/21/20 at 13:49;  Status DC


Albumin Human 200 ml @  200 mls/hr 1X PRN  PRN IV Hypotension;  Start 3/21/20 at

08:00;  Stop 3/21/20 at 13:59;  Status DC


Sodium Chloride (Normal Saline Flush) 10 ml 1X PRN  PRN IV AP catheter pack;  

Start 3/21/20 at 08:00;  Stop 3/22/20 at 07:59;  Status DC


Sodium Chloride (Normal Saline Flush) 10 ml 1X PRN  PRN IV  catheter pack;  

Start 3/21/20 at 08:00;  Stop 3/22/20 at 07:59;  Status DC


Sodium Chloride 1,000 ml @  400 mls/hr Q2H30M PRN IV PATENCY;  Start 3/21/20 at 

07:50;  Stop 3/21/20 at 19:49;  Status DC


Info (PHARMACY MONITORING -- do not chart) 1 each PRN DAILY  PRN MC SEE 

COMMENTS;  Start 3/21/20 at 08:00;  Status UNV


Info (PHARMACY MONITORING -- do not chart) 1 each PRN DAILY  PRN MC SEE 

COMMENTS;  Start 3/21/20 at 08:00;  Stop 3/23/20 at 08:25;  Status DC


Sodium Chloride 90 meq/Potassium Chloride 15 meq/ Potassium Phosphate 10 mmol/ 

Magnesium Sulfate 10 meq/Calcium Gluconate 20 meq/ Multivitamins 10 ml/Chromium/

Copper/Manganese/ Seleni/Zn 0.5 ml/ Total Parenteral Nutrition/Amino 

Acids/Dextrose/ Fat Emulsion Intravenous 1,512 ml @  63 mls/hr TPN  CONT IV  

Last administered on 3/21/20at 20:57;  Start 3/21/20 at 22:00;  Stop 3/22/20 at 

21:59;  Status DC


Sodium Chloride 90 meq/Potassium Chloride 15 meq/ Potassium Phosphate 15 mmol/ 

Magnesium Sulfate 10 meq/Calcium Gluconate 20 meq/ Multivitamins 10 ml/Chromium/

Copper/Manganese/ Seleni/Zn 0.5 ml/ Total Parenteral Nutrition/Amino 

Acids/Dextrose/ Fat Emulsion Intravenous 1,512 ml @  63 mls/hr TPN  CONT IV ;  

Start 3/22/20 at 22:00;  Stop 3/22/20 at 14:16;  Status DC


Sodium Chloride 90 meq/Potassium Chloride 15 meq/ Potassium Phosphate 15 mmol/ 

Magnesium Sulfate 10 meq/Calcium Gluconate 20 meq/ Multivitamins 10 ml/Chromium/

Copper/Manganese/ Seleni/Zn 0.5 ml/ Total Parenteral Nutrition/Amino 

Acids/Dextrose/ Fat Emulsion Intravenous 1,200 ml @  50 mls/hr TPN  CONT IV ;  

Start 3/22/20 at 22:00;  Stop 3/22/20 at 14:17;  Status DC


Sodium Chloride 90 meq/Potassium Chloride 15 meq/ Potassium Phosphate 10 mmol/ 

Magnesium Sulfate 10 meq/Calcium Gluconate 20 meq/ Multivitamins 10 ml/Chromium/

Copper/Manganese/ Seleni/Zn 0.5 ml/ Total Parenteral Nutrition/Amino 

Acids/Dextrose/ Fat Emulsion Intravenous 1,200 ml @  50 mls/hr TPN  CONT IV  

Last administered on 3/22/20at 23:29;  Start 3/22/20 at 22:00;  Stop 3/23/20 at 

21:59;  Status DC


Sodium Chloride 1,000 ml @  1,000 mls/hr Q1H PRN IV hypotension;  Start 3/23/20 

at 07:28;  Stop 3/23/20 at 13:27;  Status DC


Albumin Human 200 ml @  200 mls/hr 1X  ONCE IV  Last administered on 3/23/20at 

08:51;  Start 3/23/20 at 07:30;  Stop 3/23/20 at 08:29;  Status DC


Diphenhydramine HCl (Benadryl) 25 mg 1X PRN  PRN IV ITCHING;  Start 3/23/20 at 

07:30;  Stop 3/24/20 at 07:29;  Status DC


Diphenhydramine HCl (Benadryl) 25 mg 1X PRN  PRN IV ITCHING;  Start 3/23/20 at 

07:30;  Stop 3/24/20 at 07:29;  Status DC


Sodium Chloride 1,000 ml @  400 mls/hr Q2H30M PRN IV PATENCY;  Start 3/23/20 at 

07:28;  Stop 3/23/20 at 19:27;  Status DC


Info (PHARMACY MONITORING -- do not chart) 1 each PRN DAILY  PRN MC SEE 

COMMENTS;  Start 3/23/20 at 07:30;  Stop 4/3/20 at 13:01;  Status DC


Metronidazole 100 ml @  100 mls/hr Q6HRS IV  Last administered on 4/8/20at 

06:26;  Start 3/23/20 at 08:30;  Stop 4/8/20 at 09:58;  Status DC


Micafungin Sodium 100 mg/Dextrose 100 ml @  100 mls/hr Q24H IV  Last 

administered on 4/30/20at 08:18;  Start 3/23/20 at 09:00;  Stop 4/30/20 at 

20:58;  Status DC


Propofol 0 ml @ As Directed STK-MED ONCE IV ;  Start 3/23/20 at 07:53;  Stop 

3/23/20 at 07:53;  Status DC


Etomidate (Amidate) 20 mg STK-MED ONCE IV ;  Start 3/23/20 at 07:53;  Stop 

3/23/20 at 07:54;  Status DC


Midazolam HCl (Versed) 5 mg STK-MED ONCE .ROUTE ;  Start 3/23/20 at 07:57;  Stop

3/23/20 at 07:57;  Status DC


Fentanyl Citrate 30 ml @ 0 mls/hr CONT  PRN IV SEE PROTOCOL Last administered on

4/17/20at 06:12;  Start 3/23/20 at 08:15;  Stop 4/17/20 at 09:19;  Status DC


Artificial Tears (Artificial Tears) 1 drop PRN Q1HR  PRN OU DRY EYE, 1st choice;

 Start 3/23/20 at 08:15;  Stop 4/29/20 at 05:31;  Status DC


Midazolam HCl 50 mg/Sodium Chloride 50 ml @ 0 mls/hr CONT  PRN IV SEE PROTOCOL 

Last administered on 3/26/20at 22:39;  Start 3/23/20 at 08:15;  Stop 3/28/20 at 

15:59;  Status DC


Etomidate (Amidate) 8 mg 1X  ONCE IV  Last administered on 3/23/20at 08:33;  

Start 3/23/20 at 08:30;  Stop 3/23/20 at 08:31;  Status DC


Succinylcholine Chloride (Anectine) 120 mg 1X  ONCE IV  Last administered on 

3/23/20at 08:34;  Start 3/23/20 at 08:30;  Stop 3/23/20 at 08:31;  Status DC


Midazolam HCl (Versed) 5 mg 1X  ONCE IV ;  Start 3/23/20 at 08:30;  Stop 3/23/20

at 08:31;  Status DC


Potassium Chloride 15 meq/ Bicarbonate Dialysis Soln w/ out KCl 5,007.5 ml  @ 

1,000 mls/ hr Q5H1M IV  Last administered on 3/24/20at 11:11;  Start 3/23/20 at 

12:00;  Stop 3/24/20 at 11:15;  Status DC


Potassium Chloride 15 meq/ Bicarbonate Dialysis Soln w/ out KCl 5,007.5 ml  @ 

1,000 mls/ hr Q5H1M IV  Last administered on 3/24/20at 11:12;  Start 3/23/20 at 

12:00;  Stop 3/24/20 at 11:17;  Status DC


Potassium Chloride 15 meq/ Bicarbonate Dialysis Soln w/ out KCl 5,007.5 ml  @ 

1,000 mls/ hr Q5H1M IV  Last administered on 3/24/20at 11:11;  Start 3/23/20 at 

12:00;  Stop 3/24/20 at 11:19;  Status DC


Sodium Chloride 90 meq/Potassium Chloride 15 meq/ Potassium Phosphate 10 mmol/ 

Magnesium Sulfate 10 meq/Calcium Gluconate 20 meq/ Multivitamins 10 ml/Chromium/

Copper/Manganese/ Seleni/Zn 0.5 ml/ Total Parenteral Nutrition/Amino 

Acids/Dextrose/ Fat Emulsion Intravenous 1,400 ml @  58.333 mls/ hr TPN  CONT IV

 Last administered on 3/23/20at 21:42;  Start 3/23/20 at 22:00;  Stop 3/24/20 at

21:59;  Status DC


Heparin Sodium (Porcine) (Heparin Sodium) 5,000 unit Q8HRS SQ  Last administered

on 3/28/20at 05:55;  Start 3/23/20 at 15:00;  Stop 3/28/20 at 13:28;  Status DC


Meropenem 500 mg/ Sodium Chloride 50 ml @  100 mls/hr Q6HRS IV  Last 

administered on 3/25/20at 06:00;  Start 3/24/20 at 09:00;  Stop 3/25/20 at 

07:29;  Status DC


Potassium Phosphate 20 mmol/ Sodium Chloride 106.6667 ml @  51.667 m... 1X  ONCE

IV  Last administered on 3/24/20at 11:22;  Start 3/24/20 at 10:15;  Stop 3/24/20

at 12:18;  Status DC


Acetaminophen (Tylenol Supp) 650 mg PRN Q6HRS  PRN OR MILD PAIN / TEMP > 100.3'F

Last administered on 6/18/20at 10:16;  Start 3/24/20 at 10:30


Potassium Chloride/Water 100 ml @  100 mls/hr Q1H IV  Last administered on 

3/24/20at 12:12;  Start 3/24/20 at 11:00;  Stop 3/24/20 at 12:59;  Status DC


Potassium Chloride 20 meq/ Bicarbonate Dialysis Soln w/ out KCl 5,010 ml @  

1,000 mls/hr Q5H1M IV  Last administered on 3/25/20at 08:48;  Start 3/24/20 at 

12:00;  Stop 3/25/20 at 13:03;  Status DC


Potassium Chloride 20 meq/ Bicarbonate Dialysis Soln w/ out KCl 5,010 ml @  

1,000 mls/hr Q5H1M IV  Last administered on 3/29/20at 14:52;  Start 3/24/20 at 

11:30;  Stop 3/29/20 at 19:59;  Status DC


Potassium Chloride 20 meq/ Bicarbonate Dialysis Soln w/ out KCl 5,010 ml @  

1,000 mls/hr Q5H1M IV  Last administered on 3/29/20at 14:53;  Start 3/24/20 at 

11:30;  Stop 3/29/20 at 19:59;  Status DC


Sodium Chloride 90 meq/Potassium Chloride 15 meq/ Potassium Phosphate 15 mmol/ 

Magnesium Sulfate 10 meq/Calcium Gluconate 15 meq/ Multivitamins 10 ml/Chromium/

Copper/Manganese/ Seleni/Zn 0.5 ml/ Total Parenteral Nutrition/Amino 

Acids/Dextrose/ Fat Emulsion Intravenous 1,400 ml @  58.333 mls/ hr TPN  CONT IV

 Last administered on 3/24/20at 22:17;  Start 3/24/20 at 22:00;  Stop 3/25/20 at

21:59;  Status DC


Cefepime HCl (Maxipime) 2 gm Q12HR IVP  Last administered on 4/7/20at 20:56;  

Start 3/25/20 at 09:00;  Stop 4/8/20 at 09:58;  Status DC


Daptomycin 500 mg/ Sodium Chloride 50 ml @  100 mls/hr Q48H IV  Last 

administered on 4/10/20at 09:57;  Start 3/25/20 at 08:30;  Stop 4/10/20 at 

10:07;  Status DC


Lidocaine HCl (Buffered Lidocaine 1%) 3 ml 1X  ONCE INJ  Last administered on 

3/25/20at 10:27;  Start 3/25/20 at 10:30;  Stop 3/25/20 at 10:31;  Status DC


Potassium Phosphate 20 mmol/ Sodium Chloride 106.6667 ml @  51.667 m... 1X  ONCE

IV  Last administered on 3/25/20at 12:51;  Start 3/25/20 at 13:00;  Stop 3/25/20

at 15:03;  Status DC


Sodium Chloride 90 meq/Potassium Chloride 15 meq/ Potassium Phosphate 18 mmol/ 

Magnesium Sulfate 8 meq/Calcium Gluconate 15 meq/ Multivitamins 10 ml/Chromium/ 

Copper/Manganese/ Seleni/Zn 0.5 ml/ Total Parenteral Nutrition/Amino 

Acids/Dextrose/ Fat Emulsion Intravenous 1,400 ml @  58.333 mls/ hr TPN  CONT IV

 Last administered on 3/25/20at 22:16;  Start 3/25/20 at 22:00;  Stop 3/26/20 at

21:59;  Status DC


Potassium Chloride 20 meq/ Bicarbonate Dialysis Soln w/ out KCl 5,010 ml @  

1,000 mls/hr Q5H1M IV  Last administered on 3/29/20at 14:54;  Start 3/25/20 at 

16:00;  Stop 3/29/20 at 19:59;  Status DC


Multi-Ingred Cream/Lotion/Oil/ Oint (Artificial Tears Eye Ointment) 1 thomas PRN 

Q1HR  PRN OU DRY EYE, 2nd choice Last administered on 4/13/20at 08:19;  Start 

3/25/20 at 17:30;  Stop 6/3/20 at 14:39;  Status DC


Sodium Chloride 90 meq/Potassium Chloride 15 meq/ Potassium Phosphate 18 mmol/ 

Magnesium Sulfate 8 meq/Calcium Gluconate 15 meq/ Multivitamins 10 ml/Chromium/ 

Copper/Manganese/ Seleni/Zn 0.5 ml/ Total Parenteral Nutrition/Amino 

Acids/Dextrose/ Fat Emulsion Intravenous 1,400 ml @  58.333 mls/ hr TPN  CONT IV

 Last administered on 3/26/20at 22:00;  Start 3/26/20 at 22:00;  Stop 3/27/20 at

21:59;  Status DC


Albumin Human 500 ml @  125 mls/hr 1X  ONCE IV ;  Start 3/26/20 at 14:15;  Stop 

3/26/20 at 18:14;  Status DC


Sodium Chloride 90 meq/Potassium Chloride 15 meq/ Potassium Phosphate 18 mmol/ 

Magnesium Sulfate 8 meq/Calcium Gluconate 15 meq/ Multivitamins 10 ml/Chromium/ 

Copper/Manganese/ Seleni/Zn 0.5 ml/ Insulin Human Regular 10 unit/ Total 

Parenteral Nutrition/Amino Acids/Dextrose/ Fat Emulsion Intravenous 1,400 ml @  

58.333 mls/ hr TPN  CONT IV  Last administered on 3/27/20at 21:43;  Start 

3/27/20 at 22:00;  Stop 3/28/20 at 21:59;  Status DC


Lidocaine HCl (Buffered Lidocaine 1%) 3 ml STK-MED ONCE .ROUTE ;  Start 3/25/20 

at 10:00;  Stop 3/27/20 at 13:57;  Status DC


Midazolam HCl 100 mg/Sodium Chloride 100 ml @ 7 mls/hr CONT  PRN IV SEE PROTOCOL

Last administered on 4/8/20at 15:35;  Start 3/28/20 at 16:00;  Stop 6/3/20 at 

14:38;  Status DC


Sodium Chloride 90 meq/Potassium Chloride 15 meq/ Potassium Phosphate 18 mmol/ 

Magnesium Sulfate 8 meq/Calcium Gluconate 15 meq/ Multivitamins 10 ml/Chromium/ 

Copper/Manganese/ Seleni/Zn 0.5 ml/ Insulin Human Regular 15 unit/ Total 

Parenteral Nutrition/Amino Acids/Dextrose/ Fat Emulsion Intravenous 1,400 ml @  

58.333 mls/ hr TPN  CONT IV  Last administered on 3/28/20at 20:34;  Start 

3/28/20 at 22:00;  Stop 3/29/20 at 21:59;  Status DC


Info ([facility] Electrolyte Protocol) 1 ea CONT PRN  PRN MC PER PROTOCOL;  Start 

3/29/20 at 13:15


Sodium Chloride 90 meq/Potassium Chloride 15 meq/ Potassium Phosphate 18 mmol/ 

Magnesium Sulfate 8 meq/Calcium Gluconate 15 meq/ Multivitamins 10 ml/Chromium/ 

Copper/Manganese/ Seleni/Zn 0.5 ml/ Insulin Human Regular 15 unit/ Total 

Parenteral Nutrition/Amino Acids/Dextrose/ Fat Emulsion Intravenous 1,400 ml @  

58.333 mls/ hr TPN  CONT IV  Last administered on 3/29/20at 22:05;  Start 

3/29/20 at 22:00;  Stop 3/30/20 at 21:59;  Status DC


Potassium Chloride 15 meq/ Bicarbonate Dialysis Soln w/ out KCl 5,007.5 ml  @ 

1,000 mls/ hr Q5H1M IV  Last administered on 4/1/20at 18:14;  Start 3/29/20 at 

20:00;  Stop 4/2/20 at 13:08;  Status DC


Potassium Chloride 15 meq/ Bicarbonate Dialysis Soln w/ out KCl 5,007.5 ml  @ 

1,000 mls/ hr Q5H1M IV  Last administered on 4/1/20at 18:14;  Start 3/29/20 at 

20:00;  Stop 4/2/20 at 13:08;  Status DC


Potassium Chloride 15 meq/ Bicarbonate Dialysis Soln w/ out KCl 5,007.5 ml  @ 

1,000 mls/ hr Q5H1M IV  Last administered on 4/1/20at 18:14;  Start 3/29/20 at 

20:00;  Stop 4/2/20 at 13:08;  Status DC


Iohexol (Omnipaque 240 Mg/ml) 30 ml 1X  ONCE PO  Last administered on 3/30/20at 

11:30;  Start 3/30/20 at 11:30;  Stop 3/30/20 at 11:33;  Status DC


Info (CONTRAST GIVEN -- Rx MONITORING) 1 each PRN DAILY  PRN MC SEE COMMENTS;  

Start 3/30/20 at 11:45;  Stop 4/1/20 at 11:44;  Status DC


Sodium Chloride 90 meq/Potassium Chloride 15 meq/ Potassium Phosphate 18 mmol/ 

Magnesium Sulfate 8 meq/Calcium Gluconate 15 meq/ Multivitamins 10 ml/Chromium/ 

Copper/Manganese/ Seleni/Zn 0.5 ml/ Insulin Human Regular 15 unit/ Total 

Parenteral Nutrition/Amino Acids/Dextrose/ Fat Emulsion Intravenous 1,400 ml @  

58.333 mls/ hr TPN  CONT IV  Last administered on 3/30/20at 21:47;  Start 

3/30/20 at 22:00;  Stop 3/31/20 at 21:59;  Status DC


Sodium Chloride 90 meq/Potassium Chloride 15 meq/ Potassium Phosphate 18 mmol/ 

Magnesium Sulfate 8 meq/Calcium Gluconate 15 meq/ Multivitamins 10 ml/Chromium/ 

Copper/Manganese/ Seleni/Zn 0.5 ml/ Insulin Human Regular 20 unit/ Total 

Parenteral Nutrition/Amino Acids/Dextrose/ Fat Emulsion Intravenous 1,400 ml @  

58.333 mls/ hr TPN  CONT IV  Last administered on 3/31/20at 21:36;  Start 

3/31/20 at 22:00;  Stop 4/1/20 at 21:59;  Status DC


Alteplase, Recombinant (Cathflo For Central Catheter Clearance) 1 mg 1X  ONCE 

INT CAT  Last administered on 3/31/20at 20:03;  Start 3/31/20 at 19:30;  Stop 

3/31/20 at 19:46;  Status DC


Alteplase, Recombinant (Cathflo For Central Catheter Clearance) 1 mg 1X  ONCE 

INT CAT  Last administered on 3/31/20at 22:05;  Start 3/31/20 at 22:00;  Stop 

3/31/20 at 22:01;  Status DC


Sodium Chloride 90 meq/Potassium Chloride 15 meq/ Potassium Phosphate 18 mmol/ 

Magnesium Sulfate 8 meq/Calcium Gluconate 15 meq/ Multivitamins 10 ml/Chromium/ 

Copper/Manganese/ Seleni/Zn 0.5 ml/ Insulin Human Regular 20 unit/ Total 

Parenteral Nutrition/Amino Acids/Dextrose/ Fat Emulsion Intravenous 1,400 ml @  

58.333 mls/ hr TPN  CONT IV  Last administered on 4/1/20at 21:30;  Start 4/1/20 

at 22:00;  Stop 4/2/20 at 21:59;  Status DC


Dexmedetomidine HCl 400 mcg/ Sodium Chloride 100 ml @ 0 mls/hr CONT  PRN IV 

ANXIETY / AGITATION Last administered on 5/30/20at 12:57;  Start 4/2/20 at 

08:15;  Stop 5/30/20 at 18:31;  Status DC


Sodium Chloride 500 ml @  500 mls/hr 1X PRN  PRN IV ELEVATED BP, SEE COMMENTS;  

Start 4/2/20 at 08:15


Atropine Sulfate (ATROPINE 0.5mg SYRINGE) 0.5 mg PRN Q5MIN  PRN IV SEE COMMENTS;

 Start 4/2/20 at 08:15


Furosemide (Lasix) 20 mg 1X  ONCE IVP  Last administered on 4/2/20at 08:19;  

Start 4/2/20 at 08:15;  Stop 4/2/20 at 08:16;  Status DC


Lidocaine HCl (Buffered Lidocaine 1%) 3 ml STK-MED ONCE .ROUTE ;  Start 4/2/20 

at 08:39;  Stop 4/2/20 at 08:39;  Status DC


Lidocaine HCl (Buffered Lidocaine 1%) 6 ml 1X  ONCE INJ  Last administered on 

4/2/20at 09:05;  Start 4/2/20 at 09:00;  Stop 4/2/20 at 09:06;  Status DC


Sodium Chloride 90 meq/Potassium Chloride 15 meq/ Potassium Phosphate 18 mmol/ 

Magnesium Sulfate 8 meq/Calcium Gluconate 15 meq/ Multivitamins 10 ml/Chromium/ 

Copper/Manganese/ Seleni/Zn 0.5 ml/ Insulin Human Regular 20 unit/ Total 

Parenteral Nutrition/Amino Acids/Dextrose/ Fat Emulsion Intravenous 1,400 ml @  

58.333 mls/ hr TPN  CONT IV  Last administered on 4/2/20at 22:45;  Start 4/2/20 

at 22:00;  Stop 4/3/20 at 21:59;  Status DC


Sodium Chloride 1,000 ml @  1,000 mls/hr Q1H PRN IV hypotension;  Start 4/3/20 

at 07:30;  Stop 4/3/20 at 13:29;  Status DC


Albumin Human 200 ml @  200 mls/hr 1X PRN  PRN IV Hypotension Last administered 

on 4/3/20at 09:36;  Start 4/3/20 at 07:30;  Stop 4/3/20 at 13:29;  Status DC


Sodium Chloride (Normal Saline Flush) 10 ml 1X PRN  PRN IV AP catheter pack;  

Start 4/3/20 at 07:30;  Stop 4/3/20 at 21:29;  Status DC


Sodium Chloride (Normal Saline Flush) 10 ml 1X PRN  PRN IV  catheter pack;  

Start 4/3/20 at 07:30;  Stop 4/4/20 at 07:29;  Status DC


Sodium Chloride 1,000 ml @  400 mls/hr Q2H30M PRN IV PATENCY;  Start 4/3/20 at 

07:30;  Stop 4/3/20 at 19:29;  Status DC


Info (PHARMACY MONITORING -- do not chart) 1 each PRN DAILY  PRN MC SEE 

COMMENTS;  Start 4/3/20 at 07:30;  Stop 4/3/20 at 13:02;  Status DC


Info (PHARMACY MONITORING -- do not chart) 1 each PRN DAILY  PRN MC SEE 

COMMENTS;  Start 4/3/20 at 07:30;  Stop 4/5/20 at 12:45;  Status DC


Sodium Chloride 90 meq/Potassium Chloride 15 meq/ Potassium Phosphate 10 mmol/ 

Magnesium Sulfate 8 meq/Calcium Gluconate 15 meq/ Multivitamins 10 ml/Chromium/ 

Copper/Manganese/ Seleni/Zn 0.5 ml/ Insulin Human Regular 25 unit/ Total 

Parenteral Nutrition/Amino Acids/Dextrose/ Fat Emulsion Intravenous 1,400 ml @  

58.333 mls/ hr TPN  CONT IV  Last administered on 4/3/20at 22:19;  Start 4/3/20 

at 22:00;  Stop 4/4/20 at 21:59;  Status DC


Heparin Sodium (Porcine) (Heparin Sodium) 5,000 unit Q12HR SQ  Last administered

on 4/26/20at 08:59;  Start 4/3/20 at 21:00;  Stop 4/26/20 at 10:05;  Status DC


Ondansetron HCl (Zofran) 4 mg PRN Q6HRS  PRN IV NAUSEA/VOMITING;  Start 4/6/20 

at 07:00;  Stop 4/7/20 at 06:59;  Status DC


Fentanyl Citrate (Fentanyl 2ml Vial) 25 mcg PRN Q5MIN  PRN IV MILD PAIN 1-3;  

Start 4/6/20 at 07:00;  Stop 4/7/20 at 06:59;  Status DC


Fentanyl Citrate (Fentanyl 2ml Vial) 50 mcg PRN Q5MIN  PRN IV MODERATE TO SEVERE

PAIN;  Start 4/6/20 at 07:00;  Stop 4/7/20 at 06:59;  Status DC


Ringer's Solution 1,000 ml @  30 mls/hr Q24H IV ;  Start 4/6/20 at 07:00;  Stop 

4/6/20 at 18:59;  Status DC


Lidocaine HCl (Xylocaine-Mpf 1% 2ml Vial) 2 ml PRN 1X  PRN ID PRIOR TO IV START;

 Start 4/6/20 at 07:00;  Stop 4/7/20 at 06:59;  Status DC


Prochlorperazine Edisylate (Compazine) 5 mg PACU PRN  PRN IV NAUSEA, MRX1;  

Start 4/6/20 at 07:00;  Stop 4/7/20 at 06:59;  Status DC


Sodium Chloride 1,000 ml @  1,000 mls/hr Q1H PRN IV hypotension;  Start 4/4/20 

at 09:10;  Stop 4/4/20 at 15:09;  Status DC


Albumin Human 200 ml @  200 mls/hr 1X PRN  PRN IV Hypotension Last administered 

on 4/4/20at 10:10;  Start 4/4/20 at 09:15;  Stop 4/4/20 at 15:14;  Status DC


Sodium Chloride 1,000 ml @  400 mls/hr Q2H30M PRN IV PATENCY;  Start 4/4/20 at 

09:10;  Stop 4/4/20 at 21:09;  Status DC


Info (PHARMACY MONITORING -- do not chart) 1 each PRN DAILY  PRN MC SEE 

COMMENTS;  Start 4/4/20 at 09:15;  Stop 4/5/20 at 12:45;  Status DC


Info (PHARMACY MONITORING -- do not chart) 1 each PRN DAILY  PRN MC SEE COMMENTS

;  Start 4/4/20 at 09:15;  Stop 4/5/20 at 12:45;  Status DC


Sodium Chloride 90 meq/Potassium Chloride 15 meq/ Potassium Phosphate 10 mmol/ 

Magnesium Sulfate 8 meq/Calcium Gluconate 15 meq/ Multivitamins 10 ml/Chromium/ 

Copper/Manganese/ Seleni/Zn 0.5 ml/ Insulin Human Regular 25 unit/ Total 

Parenteral Nutrition/Amino Acids/Dextrose/ Fat Emulsion Intravenous 1,400 ml @  

58.333 mls/ hr TPN  CONT IV  Last administered on 4/4/20at 22:10;  Start 4/4/20 

at 22:00;  Stop 4/5/20 at 21:59;  Status DC


Magnesium Sulfate 50 ml @ 25 mls/hr PRN DAILY  PRN IV for Mag < 1.7 on am labs 

Last administered on 6/18/20at 10:57;  Start 4/5/20 at 09:15


Sodium Chloride 90 meq/Potassium Chloride 15 meq/ Potassium Phosphate 10 mmol/ 

Magnesium Sulfate 8 meq/Calcium Gluconate 15 meq/ Multivitamins 10 ml/Chromium/ 

Copper/Manganese/ Seleni/Zn 0.5 ml/ Insulin Human Regular 25 unit/ Total 

Parenteral Nutrition/Amino Acids/Dextrose/ Fat Emulsion Intravenous 1,400 ml @  

58.333 mls/ hr TPN  CONT IV  Last administered on 4/5/20at 21:20;  Start 4/5/20 

at 22:00;  Stop 4/6/20 at 21:59;  Status DC


Sodium Chloride 1,000 ml @  1,000 mls/hr Q1H PRN IV hypotension;  Start 4/5/20 a

t 12:23;  Stop 4/5/20 at 18:22;  Status DC


Albumin Human 200 ml @  200 mls/hr 1X  ONCE IV  Last administered on 4/5/20at 

13:34;  Start 4/5/20 at 12:30;  Stop 4/5/20 at 13:29;  Status DC


Diphenhydramine HCl (Benadryl) 25 mg 1X PRN  PRN IV ITCHING;  Start 4/5/20 at 

12:30;  Stop 4/6/20 at 12:29;  Status DC


Diphenhydramine HCl (Benadryl) 25 mg 1X PRN  PRN IV ITCHING;  Start 4/5/20 at 1

2:30;  Stop 4/6/20 at 12:29;  Status DC


Info (PHARMACY MONITORING -- do not chart) 1 each PRN DAILY  PRN MC SEE 

COMMENTS;  Start 4/5/20 at 12:30;  Status Cancel


Bupivacaine HCl/ Epinephrine Bitart (Sensorcain-Epi 0.5%-1:[id number] Mpf) 30 ml 

STK-MED ONCE .ROUTE  Last administered on 4/6/20at 11:44;  Start 4/6/20 at 

11:00;  Stop 4/6/20 at 11:01;  Status DC


Cellulose (Surgicel Fibrillar 1x2) 1 each STK-MED ONCE .ROUTE ;  Start 4/6/20 at

11:00;  Stop 4/6/20 at 11:01;  Status DC


Sodium Chloride 90 meq/Potassium Chloride 15 meq/ Potassium Phosphate 10 mmol/ 

Magnesium Sulfate 12 meq/Calcium Gluconate 15 meq/ Multivitamins 10 ml/Chromium/

Copper/Manganese/ Seleni/Zn 0.5 ml/ Insulin Human Regular 25 unit/ Total 

Parenteral Nutrition/Amino Acids/Dextrose/ Fat Emulsion Intravenous 1,400 ml @  

58.333 mls/ hr TPN  CONT IV  Last administered on 4/6/20at 22:24;  Start 4/6/20 

at 22:00;  Stop 4/7/20 at 21:59;  Status DC


Propofol 20 ml @ As Directed STK-MED ONCE IV ;  Start 4/6/20 at 11:07;  Stop 

4/6/20 at 11:07;  Status DC


Cellulose (Surgicel Hemostat 4x8) 1 each STK-MED ONCE .ROUTE  Last administered 

on 4/6/20at 11:44;  Start 4/6/20 at 11:55;  Stop 4/6/20 at 11:56;  Status DC


Sevoflurane (Ultane) 60 ml STK-MED ONCE IH ;  Start 4/6/20 at 12:46;  Stop 

4/6/20 at 12:46;  Status DC


Sodium Chloride 1,000 ml @  1,000 mls/hr Q1H PRN IV hypotension;  Start 4/6/20 

at 13:51;  Stop 4/6/20 at 19:50;  Status DC


Albumin Human 200 ml @  200 mls/hr 1X PRN  PRN IV Hypotension Last administered 

on 4/6/20at 14:51;  Start 4/6/20 at 14:00;  Stop 4/6/20 at 19:59;  Status DC


Diphenhydramine HCl (Benadryl) 25 mg 1X PRN  PRN IV ITCHING;  Start 4/6/20 at 

14:00;  Stop 4/7/20 at 13:59;  Status DC


Diphenhydramine HCl (Benadryl) 25 mg 1X PRN  PRN IV ITCHING;  Start 4/6/20 at 

14:00;  Stop 4/7/20 at 13:59;  Status DC


Sodium Chloride 1,000 ml @  400 mls/hr Q2H30M PRN IV PATENCY;  Start 4/6/20 at 

13:51;  Stop 4/7/20 at 01:50;  Status DC


Info (PHARMACY MONITORING -- do not chart) 1 each PRN DAILY  PRN MC SEE 

COMMENTS;  Start 4/6/20 at 14:00;  Stop 4/9/20 at 08:16;  Status DC


Heparin Sodium (Porcine) (Hep Lock Adult) 500 unit STK-MED ONCE IVP ;  Start 

4/7/20 at 09:29;  Stop 4/7/20 at 09:30;  Status DC


Sodium Chloride 1,000 ml @  1,000 mls/hr Q1H PRN IV hypotension;  Start 4/7/20 

at 10:43;  Stop 4/7/20 at 16:42;  Status DC


Sodium Chloride 1,000 ml @  400 mls/hr Q2H30M PRN IV PATENCY;  Start 4/7/20 at 

10:43;  Stop 4/7/20 at 22:42;  Status DC


Info (PHARMACY MONITORING -- do not chart) 1 each PRN DAILY  PRN MC SEE 

COMMENTS;  Start 4/7/20 at 10:45;  Status UNV


Info (PHARMACY MONITORING -- do not chart) 1 each PRN DAILY  PRN MC SEE 

COMMENTS;  Start 4/7/20 at 10:45;  Status UNV


Sodium Chloride 90 meq/Potassium Chloride 15 meq/ Magnesium Sulfate 12 

meq/Calcium Gluconate 15 meq/ Multivitamins 10 ml/Chromium/ Copper/Manganese/ 

Seleni/Zn 0.5 ml/ Insulin Human Regular 25 unit/ Total Parenteral 

Nutrition/Amino Acids/Dextrose/ Fat Emulsion Intravenous 1,400 ml @  58.333 mls/

hr TPN  CONT IV  Last administered on 4/7/20at 22:13;  Start 4/7/20 at 22:00;  

Stop 4/8/20 at 21:59;  Status DC


Sodium Chloride 1,000 ml @  1,000 mls/hr Q1H PRN IV hypotension;  Start 4/8/20 

at 07:50;  Stop 4/8/20 at 13:49;  Status DC


Albumin Human 200 ml @  200 mls/hr 1X  ONCE IV ;  Start 4/8/20 at 08:00;  Stop 

4/8/20 at 08:53;  Status DC


Diphenhydramine HCl (Benadryl) 25 mg 1X PRN  PRN IV ITCHING;  Start 4/8/20 at 

08:00;  Stop 4/9/20 at 07:59;  Status DC


Diphenhydramine HCl (Benadryl) 25 mg 1X PRN  PRN IV ITCHING;  Start 4/8/20 at 

08:00;  Stop 4/9/20 at 07:59;  Status DC


Info (PHARMACY MONITORING -- do not chart) 1 each PRN DAILY  PRN MC SEE 

COMMENTS;  Start 4/8/20 at 08:00;  Stop 4/9/20 at 08:16;  Status DC


Albumin Human 50 ml @ 50 mls/hr 1X  ONCE IV ;  Start 4/8/20 at 08:53;  Stop 

4/8/20 at 08:56;  Status DC


Albumin Human 200 ml @  50 mls/hr PRN 1X  PRN IV HYPOTENSION Last administered 

on 4/14/20at 11:54;  Start 4/8/20 at 09:00;  Stop 5/21/20 at 11:14;  Status DC


Meropenem 500 mg/ Sodium Chloride 50 ml @  100 mls/hr Q12H IV  Last administered

on 4/28/20at 10:45;  Start 4/8/20 at 10:00;  Stop 4/28/20 at 12:37;  Status DC


Sodium Chloride 90 meq/Magnesium Sulfate 12 meq/ Calcium Gluconate 15 meq/ 

Multivitamins 10 ml/Chromium/ Copper/Manganese/ Seleni/Zn 0.5 ml/ Insulin Human 

Regular 25 unit/ Total Parenteral Nutrition/Amino Acids/Dextrose/ Fat Emulsion 

Intravenous 1,400 ml @  58.333 mls/ hr TPN  CONT IV  Last administered on 

4/8/20at 21:41;  Start 4/8/20 at 22:00;  Stop 4/9/20 at 21:59;  Status DC


Sodium Chloride 1,000 ml @  1,000 mls/hr Q1H PRN IV hypotension;  Start 4/9/20 

at 07:58;  Stop 4/9/20 at 13:57;  Status DC


Albumin Human 200 ml @  200 mls/hr 1X PRN  PRN IV Hypotension Last administered 

on 4/9/20at 09:30;  Start 4/9/20 at 08:00;  Stop 4/9/20 at 13:59;  Status DC


Sodium Chloride 1,000 ml @  400 mls/hr Q2H30M PRN IV PATENCY;  Start 4/9/20 at 

07:58;  Stop 4/9/20 at 19:57;  Status DC


Info (PHARMACY MONITORING -- do not chart) 1 each PRN DAILY  PRN MC SEE 

COMMENTS;  Start 4/9/20 at 08:00;  Status Cancel


Info (PHARMACY MONITORING -- do not chart) 1 each PRN DAILY  PRN MC SEE 

COMMENTS;  Start 4/9/20 at 08:15;  Status UNV


Sodium Chloride 90 meq/Potassium Phosphate 5 mmol/ Magnesium Sulfate 12 

meq/Calcium Gluconate 15 meq/ Multivitamins 10 ml/Chromium/ Copper/Manganese/ 

Seleni/Zn 0.5 ml/ Insulin Human Regular 30 unit/ Total Parenteral 

Nutrition/Amino Acids/Dextrose/ Fat Emulsion Intravenous 1,400 ml @  58.333 mls/

hr TPN  CONT IV  Last administered on 4/9/20at 22:08;  Start 4/9/20 at 22:00;  

Stop 4/10/20 at 21:59;  Status DC


Linezolid/Dextrose 300 ml @  300 mls/hr Q12HR IV  Last administered on 4/20/20at

20:40;  Start 4/10/20 at 11:00;  Stop 4/21/20 at 08:10;  Status DC


Sodium Chloride 90 meq/Potassium Phosphate 15 mmol/ Magnesium Sulfate 12 meq/[place]unter

cium Gluconate 15 meq/ Multivitamins 10 ml/Chromium/ Copper/Manganese/ Seleni/Zn

0.5 ml/ Insulin Human Regular 30 unit/ Total Parenteral Nutrition/Amino 

Acids/Dextrose/ Fat Emulsion Intravenous 1,400 ml @  58.333 mls/ hr TPN  CONT IV

 Last administered on 4/10/20at 21:49;  Start 4/10/20 at 22:00;  Stop 4/11/20 at

21:59;  Status DC


Sodium Chloride 90 meq/Potassium Phosphate 15 mmol/ Magnesium Sulfate 12 

meq/Calcium Gluconate 15 meq/ Multivitamins 10 ml/Chromium/ Copper/Manganese/ 

Seleni/Zn 0.5 ml/ Insulin Human Regular 40 unit/ Total Parenteral 

Nutrition/Amino Acids/Dextrose/ Fat Emulsion Intravenous 1,400 ml @  58.333 mls/

hr TPN  CONT IV  Last administered on 4/11/20at 21:21;  Start 4/11/20 at 22:00; 

Stop 4/12/20 at 21:59;  Status DC


Sodium Chloride 1,000 ml @  1,000 mls/hr Q1H PRN IV hypotension;  Start 4/11/20 

at 13:26;  Stop 4/11/20 at 19:25;  Status DC


Albumin Human 200 ml @  200 mls/hr 1X PRN  PRN IV Hypotension Last administered 

on 4/11/20at 15:00;  Start 4/11/20 at 13:30;  Stop 4/11/20 at 19:29;  Status DC


Sodium Chloride (Normal Saline Flush) 10 ml 1X PRN  PRN IV AP catheter pack;  

Start 4/11/20 at 13:30;  Stop 4/12/20 at 13:29;  Status DC


Sodium Chloride (Normal Saline Flush) 10 ml 1X PRN  PRN IV  catheter pack;  

Start 4/11/20 at 13:30;  Stop 4/12/20 at 13:29;  Status DC


Sodium Chloride 1,000 ml @  400 mls/hr Q2H30M PRN IV PATENCY;  Start 4/11/20 at 

13:26;  Stop 4/12/20 at 01:25;  Status DC


Info (PHARMACY MONITORING -- do not chart) 1 each PRN DAILY  PRN MC SEE 

COMMENTS;  Start 4/11/20 at 13:30;  Stop 4/11/20 at 13:33;  Status DC


Info (PHARMACY MONITORING -- do not chart) 1 each PRN DAILY  PRN MC SEE 

COMMENTS;  Start 4/11/20 at 13:30;  Stop 4/11/20 at 13:34;  Status DC


Sodium Chloride 90 meq/Potassium Phosphate 19 mmol/ Magnesium Sulfate 12 

meq/Calcium Gluconate 15 meq/ Multivitamins 10 ml/Chromium/ Copper/Manganese/ 

Seleni/Zn 0.5 ml/ Insulin Human Regular 40 unit/ Total Parenteral 

Nutrition/Amino Acids/Dextrose/ Fat Emulsion Intravenous 1,400 ml @  58.333 mls/

hr TPN  CONT IV  Last administered on 4/12/20at 21:54;  Start 4/12/20 at 22:00; 

Stop 4/13/20 at 21:59;  Status DC


Sodium Chloride 1,000 ml @  1,000 mls/hr Q1H PRN IV hypotension;  Start 4/13/20 

at 09:35;  Stop 4/13/20 at 15:34;  Status DC


Albumin Human 200 ml @  200 mls/hr 1X PRN  PRN IV Hypotension;  Start 4/13/20 at

09:45;  Stop 4/13/20 at 15:44;  Status DC


Diphenhydramine HCl (Benadryl) 25 mg 1X PRN  PRN IV ITCHING;  Start 4/13/20 at 

09:45;  Stop 4/14/20 at 09:44;  Status DC


Diphenhydramine HCl (Benadryl) 25 mg 1X PRN  PRN IV ITCHING;  Start 4/13/20 at 

09:45;  Stop 4/14/20 at 09:44;  Status DC


Sodium Chloride 1,000 ml @  400 mls/hr Q2H30M PRN IV PATENCY;  Start 4/13/20 at 

09:35;  Stop 4/13/20 at 21:34;  Status DC


Info (PHARMACY MONITORING -- do not chart) 1 each PRN DAILY  PRN MC SEE COMMENTS

;  Start 4/13/20 at 09:45;  Status Cancel


Sodium Chloride 100 meq/Potassium Phosphate 19 mmol/ Magnesium Sulfate 12 

meq/Calcium Gluconate 15 meq/ Multivitamins 10 ml/Chromium/ Copper/Manganese/ 

Seleni/Zn 0.5 ml/ Insulin Human Regular 40 unit/ Potassium Chloride 20 meq/ 

Total Parenteral Nutrition/Amino Acids/Dextrose/ Fat Emulsion Intravenous 1,400 

ml @  58.333 mls/ hr TPN  CONT IV  Last administered on 4/13/20at 22:02;  Start 

4/13/20 at 22:00;  Stop 4/14/20 at 21:59;  Status DC


Furosemide (Lasix) 40 mg 1X  ONCE IVP  Last administered on 4/13/20at 14:39;  

Start 4/13/20 at 14:30;  Stop 4/13/20 at 14:31;  Status DC


Metronidazole 100 ml @  100 mls/hr Q8HRS IV  Last administered on 4/21/20at 

06:04;  Start 4/14/20 at 10:00;  Stop 4/21/20 at 08:10;  Status DC


Sodium Chloride 1,000 ml @  1,000 mls/hr Q1H PRN IV hypotension;  Start 4/14/20 

at 08:00;  Stop 4/14/20 at 13:59;  Status DC


Albumin Human 200 ml @  200 mls/hr 1X PRN  PRN IV Hypotension;  Start 4/14/20 at

08:00;  Stop 4/14/20 at 13:59;  Status DC


Sodium Chloride 1,000 ml @  400 mls/hr Q2H30M PRN IV PATENCY;  Start 4/14/20 at 

08:00;  Stop 4/14/20 at 19:59;  Status DC


Info (PHARMACY MONITORING -- do not chart) 1 each PRN DAILY  PRN MC SEE 

COMMENTS;  Start 4/14/20 at 11:30;  Status UNV


Info (PHARMACY MONITORING -- do not chart) 1 each PRN DAILY  PRN MC SEE 

COMMENTS;  Start 4/14/20 at 11:30;  Stop 4/16/20 at 12:13;  Status DC


Sodium Chloride 100 meq/Potassium Phosphate 19 mmol/ Magnesium Sulfate 12 

meq/Calcium Gluconate 15 meq/ Multivitamins 10 ml/Chromium/ Copper/Manganese/ 

Seleni/Zn 0.5 ml/ Insulin Human Regular 40 unit/ Potassium Chloride 20 meq/ 

Total Parenteral Nutrition/Amino Acids/Dextrose/ Fat Emulsion Intravenous 1,400 

ml @  58.333 mls/ hr TPN  CONT IV  Last administered on 4/14/20at 21:52;  Start 

4/14/20 at 22:00;  Stop 4/15/20 at 21:59;  Status DC


Sodium Chloride (Normal Saline Flush) 10 ml QSHIFT  PRN IV AFTER MEDS AND BLOOD 

DRAWS;  Start 4/14/20 at 15:00;  Stop 5/12/20 at 11:27;  Status DC


Sodium Chloride (Normal Saline Flush) 10 ml PRN Q5MIN  PRN IV AFTER MEDS AND 

BLOOD DRAWS;  Start 4/14/20 at 15:00


Sodium Chloride (Normal Saline Flush) 20 ml PRN Q5MIN  PRN IV AFTER MEDS AND BLO

OD DRAWS;  Start 4/14/20 at 15:00


Sodium Chloride 100 meq/Potassium Phosphate 19 mmol/ Magnesium Sulfate 12 

meq/Calcium Gluconate 15 meq/ Multivitamins 10 ml/Chromium/ Copper/Manganese/ 

Seleni/Zn 0.5 ml/ Insulin Human Regular 40 unit/ Potassium Chloride 20 meq/ 

Total Parenteral Nutrition/Amino Acids/Dextrose/ Fat Emulsion Intravenous 1,400 

ml @  58.333 mls/ hr TPN  CONT IV  Last administered on 4/15/20at 21:20;  Start 

4/15/20 at 22:00;  Stop 4/16/20 at 21:59;  Status DC


Lidocaine HCl (Buffered Lidocaine 1%) 3 ml STK-MED ONCE .ROUTE ;  Start 4/15/20 

at 13:16;  Stop 4/15/20 at 13:16;  Status DC


Lidocaine HCl (Buffered Lidocaine 1%) 6 ml 1X  ONCE INJ  Last administered on 

4/15/20at 13:45;  Start 4/15/20 at 13:30;  Stop 4/15/20 at 13:31;  Status DC


Albumin Human 100 ml @  100 mls/hr 1X  ONCE IV  Last administered on 4/15/20at 

15:41;  Start 4/15/20 at 15:00;  Stop 4/15/20 at 15:59;  Status DC


Albumin Human 50 ml @ 50 mls/hr 1X  ONCE IV  Last administered on 4/15/20at 

15:00;  Start 4/15/20 at 15:00;  Stop 4/15/20 at 15:59;  Status DC


Info (PHARMACY MONITORING -- do not chart) 1 each PRN DAILY  PRN MC SEE 

COMMENTS;  Start 4/16/20 at 11:30;  Status Cancel


Info (PHARMACY MONITORING -- do not chart) 1 each PRN DAILY  PRN MC SEE 

COMMENTS;  Start 4/16/20 at 11:30;  Status UNV


Sodium Chloride 100 meq/Potassium Phosphate 10 mmol/ Magnesium Sulfate 12 

meq/Calcium Gluconate 15 meq/ Multivitamins 10 ml/Chromium/ Copper/Manganese/ 

Seleni/Zn 0.5 ml/ Insulin Human Regular 35 unit/ Potassium Chloride 20 meq/ 

Total Parenteral Nutrition/Amino Acids/Dextrose/ Fat Emulsion Intravenous 1,400 

ml @  58.333 mls/ hr TPN  CONT IV  Last administered on 4/16/20at 22:10;  Start 

4/16/20 at 22:00;  Stop 4/17/20 at 21:59;  Status DC


Sodium Chloride 100 meq/Potassium Phosphate 5 mmol/ Magnesium Sulfate 12 

meq/Calcium Gluconate 15 meq/ Multivitamins 10 ml/Chromium/ Copper/Manganese/ 

Seleni/Zn 0.5 ml/ Insulin Human Regular 35 unit/ Potassium Chloride 20 meq/ 

Total Parenteral Nutrition/Amino Acids/Dextrose/ Fat Emulsion Intravenous 1,400 

ml @  58.333 mls/ hr TPN  CONT IV  Last administered on 4/17/20at 22:59;  Start 

4/17/20 at 22:00;  Stop 4/18/20 at 21:59;  Status DC


Sodium Chloride 1,000 ml @  1,000 mls/hr Q1H PRN IV hypotension;  Start 4/18/20 

at 08:27;  Stop 4/18/20 at 14:26;  Status DC


Albumin Human 200 ml @  200 mls/hr 1X PRN  PRN IV Hypotension Last administered 

on 4/18/20at 09:18;  Start 4/18/20 at 08:30;  Stop 4/18/20 at 14:29;  Status DC


Sodium Chloride 1,000 ml @  400 mls/hr Q2H30M PRN IV PATENCY;  Start 4/18/20 at 

08:27;  Stop 4/18/20 at 20:26;  Status DC


Info (PHARMACY MONITORING -- do not chart) 1 each PRN DAILY  PRN MC SEE 

COMMENTS;  Start 4/18/20 at 08:30;  Status Cancel


Info (PHARMACY MONITORING -- do not chart) 1 each PRN DAILY  PRN MC SEE 

COMMENTS;  Start 4/18/20 at 08:30;  Stop 4/26/20 at 13:10;  Status DC


Sodium Chloride 100 meq/Potassium Chloride 40 meq/ Magnesium Sulfate 15 

meq/Calcium Gluconate 15 meq/ Multivitamins 10 ml/Chromium/ Copper/Manganese/ 

Seleni/Zn 0.5 ml/ Insulin Human Regular 35 unit/ Total Parenteral 

Nutrition/Amino Acids/Dextrose/ Fat Emulsion Intravenous 1,400 ml @  58.333 mls/

hr TPN  CONT IV  Last administered on 4/18/20at 22:00;  Start 4/18/20 at 22:00; 

Stop 4/19/20 at 21:59;  Status DC


Potassium Chloride/Water 100 ml @  100 mls/hr 1X  ONCE IV  Last administered on 

4/18/20at 17:28;  Start 4/18/20 at 14:45;  Stop 4/18/20 at 15:44;  Status DC


Sodium Chloride 100 meq/Potassium Chloride 40 meq/ Magnesium Sulfate 15 

meq/Calcium Gluconate 15 meq/ Multivitamins 10 ml/Chromium/ Copper/Manganese/ 

Seleni/Zn 0.5 ml/ Insulin Human Regular 35 unit/ Total Parenteral 

Nutrition/Amino Acids/Dextrose/ Fat Emulsion Intravenous 1,400 ml @  58.333 mls/

hr TPN  CONT IV  Last administered on 4/19/20at 22:46;  Start 4/19/20 at 22:00; 

Stop 4/20/20 at 21:59;  Status DC


Sodium Chloride 100 meq/Potassium Chloride 40 meq/ Magnesium Sulfate 20 

meq/Calcium Gluconate 15 meq/ Multivitamins 10 ml/Chromium/ Copper/Manganese/ 

Seleni/Zn 0.5 ml/ Insulin Human Regular 35 unit/ Total Parenteral 

Nutrition/Amino Acids/Dextrose/ Fat Emulsion Intravenous 1,400 ml @  58.333 mls/

hr TPN  CONT IV  Last administered on 4/20/20at 22:31;  Start 4/20/20 at 22:00; 

Stop 4/21/20 at 21:59;  Status DC


Fentanyl Citrate (Fentanyl 2ml Vial) 50 mcg PRN Q2HR  PRN IVP PAIN Last 

administered on 4/27/20at 13:32;  Start 4/20/20 at 21:00;  Stop 4/28/20 at 

12:53;  Status DC


Fentanyl Citrate (Fentanyl 2ml Vial) 25 mcg PRN Q2HR  PRN IVP PAIN;  Start 

4/20/20 at 21:00;  Stop 4/28/20 at 12:54;  Status DC


Enoxaparin Sodium (Lovenox 100mg Syringe) 100 mg Q12HR SQ ;  Start 4/21/20 at 

21:00;  Status UNV


Amino Acids/ Glycerin/ Electrolytes 1,000 ml @  75 mls/hr [id number] IV ;  Start 

4/20/20 at 21:15;  Status UNV


Sodium Chloride 1,000 ml @  1,000 mls/hr Q1H PRN IV hypotension;  Start 4/21/20 

at 07:56;  Stop 4/21/20 at 13:55;  Status DC


Albumin Human 200 ml @  200 mls/hr 1X PRN  PRN IV Hypotension Last administered 

on 4/21/20at 08:40;  Start 4/21/20 at 08:00;  Stop 4/21/20 at 13:59;  Status DC


Sodium Chloride 1,000 ml @  400 mls/hr Q2H30M PRN IV PATENCY;  Start 4/21/20 at 

07:56;  Stop 4/21/20 at 19:55;  Status DC


Info (PHARMACY MONITORING -- do not chart) 1 each PRN DAILY  PRN MC SEE 

COMMENTS;  Start 4/21/20 at 08:00;  Status UNV


Info (PHARMACY MONITORING -- do not chart) 1 each PRN DAILY  PRN MC SEE 

COMMENTS;  Start 4/21/20 at 08:00;  Status UNV


Daptomycin 430 mg/ Sodium Chloride 50 ml @  100 mls/hr Q24H IV  Last 

administered on 4/21/20at 12:35;  Start 4/21/20 at 09:00;  Stop 4/21/20 at 

12:49;  Status DC


Sodium Chloride 100 meq/Potassium Chloride 40 meq/ Magnesium Sulfate 20 

meq/Calcium Gluconate 15 meq/ Multivitamins 10 ml/Chromium/ Copper/Manganese/ 

Seleni/Zn 0.5 ml/ Insulin Human Regular 35 unit/ Total Parenteral Nutrition

/Amino Acids/Dextrose/ Fat Emulsion Intravenous 1,400 ml @  58.333 mls/ hr TPN  

CONT IV  Last administered on 4/21/20at 21:26;  Start 4/21/20 at 22:00;  Stop 

4/22/20 at 21:59;  Status DC


Daptomycin 430 mg/ Sodium Chloride 50 ml @  100 mls/hr Q48H IV ;  Start 4/23/20 

at 09:00;  Stop 4/22/20 at 11:55;  Status DC


Sodium Chloride 100 meq/Potassium Chloride 40 meq/ Magnesium Sulfate 20 

meq/Calcium Gluconate 15 meq/ Multivitamins 10 ml/Chromium/ Copper/Manganese/ 

Seleni/Zn 0.5 ml/ Insulin Human Regular 35 unit/ Total Parenteral 

Nutrition/Amino Acids/Dextrose/ Fat Emulsion Intravenous 1,400 ml @  58.333 mls/

hr TPN  CONT IV  Last administered on 4/22/20at 22:27;  Start 4/22/20 at 22:00; 

Stop 4/23/20 at 21:59;  Status DC


Daptomycin 430 mg/ Sodium Chloride 50 ml @  100 mls/hr Q24H IV  Last 

administered on 4/24/20at 15:07;  Start 4/22/20 at 13:00;  Stop 4/25/20 at 

13:15;  Status DC


Sodium Chloride 100 meq/Potassium Chloride 40 meq/ Magnesium Sulfate 20 

meq/Calcium Gluconate 10 meq/ Multivitamins 10 ml/Chromium/ Copper/Manganese/ 

Seleni/Zn 0.5 ml/ Insulin Human Regular 35 unit/ Total Parenteral 

Nutrition/Amino Acids/Dextrose/ Fat Emulsion Intravenous 1,400 ml @  58.333 mls/

hr TPN  CONT IV  Last administered on 4/24/20at 00:06;  Start 4/23/20 at 22:00; 

Stop 4/24/20 at 21:59;  Status DC


Alteplase, Recombinant (Cathflo For Central Catheter Clearance) 1 mg 1X  ONCE 

INT CAT  Last administered on 4/24/20at 11:44;  Start 4/24/20 at 10:45;  Stop 

4/24/20 at 10:46;  Status DC


Ondansetron HCl (Zofran) 4 mg PRN Q6HRS  PRN IV NAUSEA/VOMITING;  Start 4/27/20 

at 07:00;  Stop 4/28/20 at 06:59;  Status DC


Fentanyl Citrate (Fentanyl 2ml Vial) 25 mcg PRN Q5MIN  PRN IV MILD PAIN 1-3;  

Start 4/27/20 at 07:00;  Stop 4/28/20 at 06:59;  Status DC


Fentanyl Citrate (Fentanyl 2ml Vial) 50 mcg PRN Q5MIN  PRN IV MODERATE TO SEVERE

PAIN Last administered on 4/27/20at 10:17;  Start 4/27/20 at 07:00;  Stop 

4/28/20 at 06:59;  Status DC


Ringer's Solution 1,000 ml @  30 mls/hr Q24H IV ;  Start 4/27/20 at 07:00;  Stop

4/27/20 at 18:59;  Status DC


Lidocaine HCl (Xylocaine-Mpf 1% 2ml Vial) 2 ml PRN 1X  PRN ID PRIOR TO IV START;

 Start 4/27/20 at 07:00;  Stop 4/28/20 at 06:59;  Status DC


Prochlorperazine Edisylate (Compazine) 5 mg PACU PRN  PRN IV NAUSEA, MRX1;  

Start 4/27/20 at 07:00;  Stop 4/28/20 at 06:59;  Status DC


Sodium Acetate 50 meq/Potassium Acetate 55 meq/ Magnesium Sulfate 20 meq/Calcium

Gluconate 10 meq/ Multivitamins 10 ml/Chromium/ Copper/Manganese/ Seleni/Zn 0.5 

ml/ Insulin Human Regular 35 unit/ Total Parenteral Nutrition/Amino 

Acids/Dextrose/ Fat Emulsion Intravenous 1,400 ml @  58.333 mls/ hr TPN  CONT IV

;  Start 4/24/20 at 22:00;  Stop 4/24/20 at 14:15;  Status DC


Sodium Acetate 50 meq/Potassium Acetate 55 meq/ Magnesium Sulfate 20 meq/Calcium

Gluconate 10 meq/ Multivitamins 10 ml/Chromium/ Copper/Manganese/ Seleni/Zn 0.5 

ml/ Insulin Human Regular 35 unit/ Total Parenteral Nutrition/Amino 

Acids/Dextrose/ Fat Emulsion Intravenous 1,800 ml @  75 mls/hr TPN  CONT IV  

Last administered on 4/24/20at 22:38;  Start 4/24/20 at 22:00;  Stop 4/25/20 at 

21:59;  Status DC


Sodium Chloride 1,000 ml @  1,000 mls/hr Q1H PRN IV hypotension;  Start 4/24/20 

at 15:31;  Stop 4/24/20 at 21:30;  Status DC


Diphenhydramine HCl (Benadryl) 25 mg 1X PRN  PRN IV ITCHING;  Start 4/24/20 at 

15:45;  Stop 4/25/20 at 15:44;  Status DC


Diphenhydramine HCl (Benadryl) 25 mg 1X PRN  PRN IV ITCHING;  Start 4/24/20 at 

15:45;  Stop 4/25/20 at 15:44;  Status DC


Sodium Chloride 1,000 ml @  400 mls/hr Q2H30M PRN IV PATENCY;  Start 4/24/20 at 

15:31;  Stop 4/25/20 at 03:30;  Status DC


Info (PHARMACY MONITORING -- do not chart) 1 each PRN DAILY  PRN MC SEE 

COMMENTS;  Start 4/24/20 at 15:45;  Stop 5/26/20 at 14:14;  Status DC


Sodium Acetate 50 meq/Potassium Acetate 55 meq/ Magnesium Sulfate 20 meq/Calcium

Gluconate 10 meq/ Multivitamins 10 ml/Chromium/ Copper/Manganese/ Seleni/Zn 0.5 

ml/ Insulin Human Regular 35 unit/ Total Parenteral Nutrition/Amino 

Acids/Dextrose/ Fat Emulsion Intravenous 1,800 ml @  75 mls/hr TPN  CONT IV  

Last administered on 4/25/20at 22:03;  Start 4/25/20 at 22:00;  Stop 4/26/20 at 

21:59;  Status DC


Daptomycin 430 mg/ Sodium Chloride 50 ml @  100 mls/hr Q24H IV  Last 

administered on 4/30/20at 13:00;  Start 4/25/20 at 13:00;  Stop 4/30/20 at 

20:58;  Status DC


Heparin Sodium (Porcine) 1000 unit/Sodium Chloride 1,001 ml @  1,001 mls/hr 1X  

ONCE IRR ;  Start 4/27/20 at 06:00;  Stop 4/27/20 at 06:59;  Status DC


Potassium Acetate 55 meq/Magnesium Sulfate 20 meq/ Calcium Gluconate 10 meq/ 

Multivitamins 10 ml/Chromium/ Copper/Manganese/ Seleni/Zn 0.5 ml/ Insulin Human 

Regular 35 unit/ Total Parenteral Nutrition/Amino Acids/Dextrose/ Fat Emulsion 

Intravenous 1,920 ml @  80 mls/hr TPN  CONT IV  Last administered on 4/26/20at 

22:10;  Start 4/26/20 at 22:00;  Stop 4/27/20 at 21:59;  Status DC


Dexamethasone Sodium Phosphate (Decadron) 4 mg STK-MED ONCE .ROUTE ;  Start 

4/27/20 at 10:56;  Stop 4/27/20 at 10:57;  Status DC


Ondansetron HCl (Zofran) 4 mg STK-MED ONCE .ROUTE ;  Start 4/27/20 at 10:56;  

Stop 4/27/20 at 10:57;  Status DC


Rocuronium Bromide (Zemuron) 50 mg STK-MED ONCE .ROUTE ;  Start 4/27/20 at 

10:56;  Stop 4/27/20 at 10:57;  Status DC


Fentanyl Citrate (Fentanyl 2ml Vial) 100 mcg STK-MED ONCE .ROUTE ;  Start 

4/27/20 at 10:56;  Stop 4/27/20 at 10:57;  Status DC


Bupivacaine HCl/ Epinephrine Bitart (Sensorcain-Epi 0.5%-1:260899 Mpf) 30 ml 

STK-MED ONCE .ROUTE  Last administered on 4/27/20at 12:01;  Start 4/27/20 at 

10:58;  Stop 4/27/20 at 10:58;  Status DC


Cellulose (Surgicel Hemostat 2x14) 1 each STK-MED ONCE .ROUTE ;  Start 4/27/20 

at 10:58;  Stop 4/27/20 at 10:59;  Status DC


Iohexol (Omnipaque 300 Mg/ml) 50 ml STK-MED ONCE .ROUTE ;  Start 4/27/20 at 

10:58;  Stop 4/27/20 at 10:59;  Status DC


Cellulose (Surgicel Hemostat 4x8) 1 each STK-MED ONCE .ROUTE ;  Start 4/27/20 at

10:58;  Stop 4/27/20 at 10:59;  Status DC


Bisacodyl (Dulcolax Supp) 10 mg STK-MED ONCE .ROUTE ;  Start 4/27/20 at 10:59;  

Stop 4/27/20 at 10:59;  Status DC


Heparin Sodium (Porcine) 1000 unit/Sodium Chloride 1,001 ml @  1,001 mls/hr 1X  

ONCE IRR ;  Start 4/27/20 at 12:00;  Stop 4/27/20 at 12:59;  Status DC


Propofol 20 ml @ As Directed STK-MED ONCE IV ;  Start 4/27/20 at 11:05;  Stop 

4/27/20 at 11:05;  Status DC


Sevoflurane (Ultane) 90 ml STK-MED ONCE IH ;  Start 4/27/20 at 11:05;  Stop 

4/27/20 at 11:05;  Status DC


Sevoflurane (Ultane) 60 ml STK-MED ONCE IH ;  Start 4/27/20 at 12:26;  Stop 

4/27/20 at 12:27;  Status DC


Propofol 20 ml @ As Directed STK-MED ONCE IV ;  Start 4/27/20 at 12:26;  Stop 4 /27/20 at 12:27;  Status DC


Phenylephrine HCl (PHENYLEPHRINE in 0.9% NACL PF) 1 mg STK-MED ONCE IV ;  Start 

4/27/20 at 12:34;  Stop 4/27/20 at 12:34;  Status DC


Heparin Sodium (Porcine) (Heparin Sodium) 5,000 unit Q12HR SQ  Last administered

on 5/6/20at 20:57;  Start 4/27/20 at 21:00;  Stop 5/7/20 at 09:59;  Status DC


Sodium Chloride (Normal Saline Flush) 3 ml QSHIFT  PRN IV AFTER MEDS AND BLOOD 

DRAWS;  Start 4/27/20 at 13:45


Naloxone HCl (Narcan) 0.4 mg PRN Q2MIN  PRN IV SEE INSTRUCTIONS Last 

administered on 6/6/20at 15:15;  Start 4/27/20 at 13:45


Sodium Chloride 1,000 ml @  25 mls/hr Q24H IV  Last administered on 5/26/20at 

13:37;  Start 4/27/20 at 13:37;  Stop 5/29/20 at 13:09;  Status DC


Naloxone HCl (Narcan) 0.4 mg PRN Q2MIN  PRN IV SEE INSTRUCTIONS;  Start 4/27/20 

at 14:30;  Status UNV


Sodium Chloride 1,000 ml @  25 mls/hr Q24H IV ;  Start 4/27/20 at 14:30;  Status

UNV


Hydromorphone HCl 30 ml @ 0 mls/hr CONT PRN  PRN IV PER PROTOCOL Last 

administered on 5/2/20at 16:08;  Start 4/27/20 at 14:30;  Stop 5/4/20 at 08:55; 

Status DC


Potassium Acetate 55 meq/Magnesium Sulfate 20 meq/ Calcium Gluconate 10 meq/ 

Multivitamins 10 ml/Chromium/ Copper/Manganese/ Seleni/Zn 0.5 ml/ Insulin Human 

Regular 35 unit/ Total Parenteral Nutrition/Amino Acids/Dextrose/ Fat Emulsion 

Intravenous 1,920 ml @  80 mls/hr TPN  CONT IV  Last administered on 4/27/20at 

22:01;  Start 4/27/20 at 22:00;  Stop 4/28/20 at 21:59;  Status DC


Bumetanide (Bumex) 2 mg BID92 IV  Last administered on 5/1/20at 13:50;  Start 

4/28/20 at 14:00;  Stop 5/2/20 at 14:10;  Status DC


Meropenem 1 gm/ Sodium Chloride 100 ml @  200 mls/hr Q8HRS IV  Last administered

on 5/22/20at 05:53;  Start 4/28/20 at 14:00;  Stop 5/22/20 at 09:31;  Status DC


Potassium Acetate 55 meq/Magnesium Sulfate 20 meq/ Calcium Gluconate 10 meq/ 

Multivitamins 10 ml/Chromium/ Copper/Manganese/ Seleni/Zn 0.5 ml/ Insulin Human 

Regular 35 unit/ Total Parenteral Nutrition/Amino Acids/Dextrose/ Fat Emulsion 

Intravenous 1,920 ml @  80 mls/hr TPN  CONT IV  Last administered on 4/28/20at 

22:02;  Start 4/28/20 at 22:00;  Stop 4/29/20 at 21:59;  Status DC


Hydromorphone HCl (Dilaudid Standard PCA) 12 mg STK-MED ONCE IV ;  Start 4/27/20

at 14:35;  Stop 4/28/20 at 13:53;  Status DC


Artificial Tears (Artificial Tears) 1 drop PRN Q15MIN  PRN OU DRY EYE Last 

administered on 6/15/20at 03:38;  Start 4/29/20 at 05:30


Hydromorphone HCl (Dilaudid Standard PCA) 12 mg STK-MED ONCE IV ;  Start 4/28/20

at 12:05;  Stop 4/29/20 at 09:15;  Status DC


Potassium Acetate 65 meq/Magnesium Sulfate 20 meq/ Calcium Gluconate 10 meq/ 

Multivitamins 10 ml/Chromium/ Copper/Manganese/ Seleni/Zn 0.5 ml/ Insulin Human 

Regular 30 unit/ Total Parenteral Nutrition/Amino Acids/Dextrose/ Fat Emulsion 

Intravenous 1,920 ml @  80 mls/hr TPN  CONT IV  Last administered on 4/29/20at 

22:22;  Start 4/29/20 at 22:00;  Stop 4/30/20 at 21:59;  Status DC


Cyclobenzaprine HCl (Flexeril) 10 mg PRN Q6HRS  PRN PO MUSCLE SPASMS;  Start 

4/30/20 at 10:45


Potassium Acetate 55 meq/Magnesium Sulfate 20 meq/ Calcium Gluconate 10 meq/ 

Multivitamins 10 ml/Chromium/ Copper/Manganese/ Seleni/Zn 0.5 ml/ Insulin Human 

Regular 30 unit/ Total Parenteral Nutrition/Amino Acids/Dextrose/ Fat Emulsion I

ntravenous 1,920 ml @  80 mls/hr TPN  CONT IV  Last administered on 5/1/20at 

01:00;  Start 4/30/20 at 22:00;  Stop 5/1/20 at 21:59;  Status DC


Magnesium Sulfate 50 ml @ 25 mls/hr 1X  ONCE IV  Last administered on 4/30/20at 

17:18;  Start 4/30/20 at 12:45;  Stop 4/30/20 at 14:44;  Status DC


Potassium Chloride/Water 100 ml @  100 mls/hr 1X  ONCE IV  Last administered on 

5/1/20at 11:27;  Start 5/1/20 at 12:00;  Stop 5/1/20 at 12:59;  Status DC


Hydromorphone HCl (Dilaudid Standard PCA) 12 mg STK-MED ONCE IV ;  Start 4/29/20

at 10:50;  Stop 5/1/20 at 11:02;  Status DC


Hydromorphone HCl (Dilaudid Standard PCA) 12 mg STK-MED ONCE IV ;  Start 4/30/20

at 13:47;  Stop 5/1/20 at 11:03;  Status DC


Potassium Acetate 30 meq/Magnesium Sulfate 20 meq/ Calcium Gluconate 10 meq/ 

Multivitamins 10 ml/Chromium/ Copper/Manganese/ Seleni/Zn 0.5 ml/ Insulin Human 

Regular 30 unit/ Potassium Chloride 30 meq/ Total Parenteral Nutrition/Amino 

Acids/Dextrose/ Fat Emulsion Intravenous 1,920 ml @  80 mls/hr TPN  CONT IV  

Last administered on 5/1/20at 22:34;  Start 5/1/20 at 22:00;  Stop 5/2/20 at 

21:59;  Status DC


Potassium Chloride/Water 100 ml @  100 mls/hr Q1H IV  Last administered on 

5/2/20at 13:05;  Start 5/2/20 at 07:00;  Stop 5/2/20 at 10:59;  Status DC


Magnesium Sulfate 50 ml @ 25 mls/hr 1X  ONCE IV  Last administered on 5/2/20at 

10:34;  Start 5/2/20 at 10:30;  Stop 5/2/20 at 12:29;  Status DC


Potassium Chloride 75 meq/ Magnesium Sulfate 20 meq/Calcium Gluconate 10 meq/ 

Multivitamins 10 ml/Chromium/ Copper/Manganese/ Seleni/Zn 0.5 ml/ Insulin Human 

Regular 30 unit/ Total Parenteral Nutrition/Amino Acids/Dextrose/ Fat Emulsion 

Intravenous 1,920 ml @  80 mls/hr TPN  CONT IV  Last administered on 5/2/20at 

21:51;  Start 5/2/20 at 22:00;  Stop 5/3/20 at 22:00;  Status DC


Potassium Chloride 75 meq/ Magnesium Sulfate 20 meq/Calcium Gluconate 10 meq/ 

Multivitamins 10 ml/Chromium/ Copper/Manganese/ Seleni/Zn 0.5 ml/ Insulin Human 

Regular 25 unit/ Total Parenteral Nutrition/Amino Acids/Dextrose/ Fat Emulsion 

Intravenous 1,920 ml @  80 mls/hr TPN  CONT IV  Last administered on 5/3/20at 

22:04;  Start 5/3/20 at 22:00;  Stop 5/4/20 at 21:59;  Status DC


Hydromorphone HCl (Dilaudid) 0.4 mg PRN Q4HRS  PRN IVP PAIN Last administered on

5/4/20at 10:57;  Start 5/4/20 at 09:00;  Stop 5/4/20 at 18:59;  Status DC


Micafungin Sodium 100 mg/Dextrose 100 ml @  100 mls/hr Q24H IV  Last 

administered on 5/26/20at 12:17;  Start 5/4/20 at 11:00;  Stop 5/27/20 at 09:59;

 Status DC


Daptomycin 485 mg/ Sodium Chloride 50 ml @  100 mls/hr Q24H IV  Last 

administered on 5/11/20at 13:10;  Start 5/4/20 at 11:00;  Stop 5/12/20 at 07:44;

 Status DC


Potassium Chloride 75 meq/ Magnesium Sulfate 15 meq/Calcium Gluconate 8 meq/ 

Multivitamins 10 ml/Chromium/ Copper/Manganese/ Seleni/Zn 0.5 ml/ Insulin Human 

Regular 25 unit/ Total Parenteral Nutrition/Amino Acids/Dextrose/ Fat Emulsion 

Intravenous 1,920 ml @  80 mls/hr TPN  CONT IV  Last administered on 5/4/20at 

23:08;  Start 5/4/20 at 22:00;  Stop 5/5/20 at 21:59;  Status DC


Haloperidol Lactate (Haldol Inj) 3 mg 1X  ONCE IVP  Last administered on 

5/4/20at 14:37;  Start 5/4/20 at 14:30;  Stop 5/4/20 at 14:31;  Status DC


Hydromorphone HCl (Dilaudid) 1 mg PRN Q4HRS  PRN IVP PAIN Last administered on 

5/18/20at 06:25;  Start 5/4/20 at 19:00;  Stop 5/18/20 at 17:10;  Status DC


Potassium Chloride 75 meq/ Magnesium Sulfate 15 meq/Calcium Gluconate 8 meq/ 

Multivitamins 10 ml/Chromium/ Copper/Manganese/ Seleni/Zn 0.5 ml/ Insulin Human 

Regular 20 unit/ Total Parenteral Nutrition/Amino Acids/Dextrose/ Fat Emulsion 

Intravenous 1,920 ml @  80 mls/hr TPN  CONT IV  Last administered on 5/5/20at 

22:10;  Start 5/5/20 at 22:00;  Stop 5/6/20 at 21:59;  Status DC


Lidocaine HCl (Buffered Lidocaine 1%) 3 ml STK-MED ONCE .ROUTE ;  Start 5/6/20 

at 11:31;  Stop 5/6/20 at 11:31;  Status DC


Lidocaine HCl (Buffered Lidocaine 1%) 3 ml STK-MED ONCE .ROUTE ;  Start 5/6/20 

at 12:28;  Stop 5/6/20 at 12:29;  Status DC


Lidocaine HCl (Buffered Lidocaine 1%) 6 ml 1X  ONCE INJ  Last administered on 

5/6/20at 12:53;  Start 5/6/20 at 12:45;  Stop 5/6/20 at 12:46;  Status DC


Potassium Chloride 75 meq/ Magnesium Sulfate 15 meq/Calcium Gluconate 8 meq/ 

Multivitamins 10 ml/Chromium/ Copper/Manganese/ Seleni/Zn 0.5 ml/ Insulin Human 

Regular 20 unit/ Total Parenteral Nutrition/Amino Acids/Dextrose/ Fat Emulsion 

Intravenous 1,920 ml @  80 mls/hr TPN  CONT IV  Last administered on 5/6/20at 

22:00;  Start 5/6/20 at 22:00;  Stop 5/7/20 at 21:59;  Status DC


Potassium Chloride 75 meq/ Magnesium Sulfate 15 meq/Calcium Gluconate 8 meq/ 

Multivitamins 10 ml/Chromium/ Copper/Manganese/ Seleni/Zn 0.5 ml/ Insulin Human 

Regular 15 unit/ Total Parenteral Nutrition/Amino Acids/Dextrose/ Fat Emulsion 

Intravenous 1,920 ml @  80 mls/hr TPN  CONT IV  Last administered on 5/7/20at 

22:28;  Start 5/7/20 at 22:00;  Stop 5/8/20 at 21:59;  Status DC


Vecuronium Bromide (Norcuron Bolus) 6 mg PRN Q6HRS  PRN IV VENT ASYNCHRONY;  

Start 5/7/20 at 19:15;  Stop 5/7/20 at 19:35;  Status DC


Bumetanide (Bumex) 2 mg 1X  ONCE IV  Last administered on 5/7/20at 22:09;  Start

5/7/20 at 19:45;  Stop 5/7/20 at 19:46;  Status DC


Lidocaine HCl (Buffered Lidocaine 1%) 3 ml STK-MED ONCE .ROUTE ;  Start 5/8/20 

at 07:59;  Stop 5/8/20 at 07:59;  Status DC


Midazolam HCl (Versed) 5 mg STK-MED ONCE .ROUTE ;  Start 5/8/20 at 08:36;  Stop 

5/8/20 at 08:36;  Status DC


Fentanyl Citrate (Fentanyl 5ml Vial) 250 mcg STK-MED ONCE .ROUTE ;  Start 5/8/20

at 08:36;  Stop 5/8/20 at 08:37;  Status DC


Lidocaine HCl (Buffered Lidocaine 1%) 3 ml 1X  ONCE IJ  Last administered on 

5/8/20at 09:30;  Start 5/8/20 at 09:15;  Stop 5/8/20 at 09:16;  Status DC


Midazolam HCl (Versed) 5 mg 1X  ONCE IV  Last administered on 5/8/20at 09:30;  

Start 5/8/20 at 09:15;  Stop 5/8/20 at 09:16;  Status DC


Fentanyl Citrate (Fentanyl 5ml Vial) 250 mcg 1X  ONCE IV  Last administered on 

5/8/20at 09:30;  Start 5/8/20 at 09:15;  Stop 5/8/20 at 09:16;  Status DC


Bumetanide (Bumex) 2 mg DAILY IV  Last administered on 5/18/20at 08:07;  Start 

5/8/20 at 10:00;  Stop 5/18/20 at 17:15;  Status DC


Potassium Chloride 75 meq/ Magnesium Sulfate 15 meq/ Multivitamins 10 

ml/Chromium/ Copper/Manganese/ Seleni/Zn 0.5 ml/ Insulin Human Regular 15 unit/ 

Total Parenteral Nutrition/Amino Acids/Dextrose/ Fat Emulsion Intravenous 1,920 

ml @  80 mls/hr TPN  CONT IV  Last administered on 5/8/20at 21:59;  Start 5/8/20

at 22:00;  Stop 5/9/20 at 21:59;  Status DC


Metoclopramide HCl (Reglan Vial) 10 mg PRN Q3HRS  PRN IVP NAUSEA/VOMITING-3rd 

choice Last administered on 5/14/20at 04:25;  Start 5/9/20 at 16:45


Potassium Chloride 75 meq/ Magnesium Sulfate 15 meq/ Multivitamins 10 

ml/Chromium/ Copper/Manganese/ Seleni/Zn 0.5 ml/ Insulin Human Regular 15 unit/ 

Total Parenteral Nutrition/Amino Acids/Dextrose/ Fat Emulsion Intravenous 1,920 

ml @  80 mls/hr TPN  CONT IV  Last administered on 5/9/20at 22:41;  Start 5/9/20

at 22:00;  Stop 5/10/20 at 21:59;  Status DC


Magnesium Sulfate 50 ml @ 25 mls/hr 1X  ONCE IV  Last administered on 5/10/20at 

10:44;  Start 5/10/20 at 09:00;  Stop 5/10/20 at 10:59;  Status DC


Potassium Chloride/Water 100 ml @  100 mls/hr 1X  ONCE IV  Last administered on 

5/10/20at 09:37;  Start 5/10/20 at 09:00;  Stop 5/10/20 at 09:59;  Status DC


Duloxetine HCl (Cymbalta) 30 mg DAILY PO  Last administered on 5/11/20at 09:48; 

Start 5/10/20 at 14:00;  Stop 5/13/20 at 10:25;  Status DC


Potassium Chloride 80 meq/ Magnesium Sulfate 20 meq/ Multivitamins 10 

ml/Chromium/ Copper/Manganese/ Seleni/Zn 0.5 ml/ Insulin Human Regular 15 unit/ 

Total Parenteral Nutrition/Amino Acids/Dextrose/ Fat Emulsion Intravenous 1,920 

ml @  80 mls/hr TPN  CONT IV  Last administered on 5/10/20at 21:42;  Start 

5/10/20 at 22:00;  Stop 5/11/20 at 21:59;  Status DC


Potassium Chloride 80 meq/ Magnesium Sulfate 20 meq/ Multivitamins 10 

ml/Chromium/ Copper/Manganese/ Seleni/Zn 0.5 ml/ Insulin Human Regular 15 unit/ 

Total Parenteral Nutrition/Amino Acids/Dextrose/ Fat Emulsion Intravenous 1,920 

ml @  80 mls/hr TPN  CONT IV  Last administered on 5/11/20at 22:20;  Start 

5/11/20 at 22:00;  Stop 5/12/20 at 21:59;  Status DC


Lidocaine HCl (Buffered Lidocaine 1%) 3 ml STK-MED ONCE .ROUTE ;  Start 5/12/20 

at 09:54;  Stop 5/12/20 at 09:55;  Status DC


Hydromorphone HCl (Dilaudid Standard PCA) 12 mg STK-MED ONCE IV ;  Start 5/1/20 

at 15:50;  Stop 5/12/20 at 11:24;  Status DC


Potassium Chloride 80 meq/ Magnesium Sulfate 20 meq/ Multivitamins 10 

ml/Chromium/ Copper/Manganese/ Seleni/Zn 0.5 ml/ Insulin Human Regular 15 unit/ 

Total Parenteral Nutrition/Amino Acids/Dextrose/ Fat Emulsion Intravenous 1,920 

ml @  80 mls/hr TPN  CONT IV  Last administered on 5/12/20at 21:40;  Start 

5/12/20 at 22:00;  Stop 5/13/20 at 21:59;  Status DC


Lidocaine HCl (Buffered Lidocaine 1%) 6 ml 1X  ONCE INJ  Last administered on 

5/12/20at 14:15;  Start 5/12/20 at 14:15;  Stop 5/12/20 at 14:16;  Status DC


Potassium Chloride 80 meq/ Magnesium Sulfate 20 meq/ Multivitamins 10 

ml/Chromium/ Copper/Manganese/ Seleni/Zn 1 ml/ Insulin Human Regular 15 unit/ 

Total Parenteral Nutrition/Amino Acids/Dextrose/ Fat Emulsion Intravenous 1,920 

ml @  80 mls/hr TPN  CONT IV  Last administered on 5/13/20at 22:04;  Start 

5/13/20 at 22:00;  Stop 5/14/20 at 21:59;  Status DC


Potassium Chloride/Water 100 ml @  100 mls/hr 1X  ONCE IV  Last administered on 

5/14/20at 11:34;  Start 5/14/20 at 11:00;  Stop 5/14/20 at 11:59;  Status DC


Potassium Chloride 90 meq/ Magnesium Sulfate 20 meq/ Multivitamins 10 

ml/Chromium/ Copper/Manganese/ Seleni/Zn 1 ml/ Insulin Human Regular 15 unit/ 

Total Parenteral Nutrition/Amino Acids/Dextrose/ Fat Emulsion Intravenous 1,920 

ml @  80 mls/hr TPN  CONT IV  Last administered on 5/14/20at 22:57;  Start 

5/14/20 at 22:00;  Stop 5/15/20 at 21:59;  Status DC


Potassium Chloride 90 meq/ Magnesium Sulfate 20 meq/ Multivitamins 10 

ml/Chromium/ Copper/Manganese/ Seleni/Zn 1 ml/ Insulin Human Regular 15 unit/ 

Total Parenteral Nutrition/Amino Acids/Dextrose/ Fat Emulsion Intravenous 1,920 

ml @  80 mls/hr TPN  CONT IV  Last administered on 5/15/20at 22:48;  Start 

5/15/20 at 22:00;  Stop 5/16/20 at 21:59;  Status DC


Potassium Chloride 90 meq/ Magnesium Sulfate 20 meq/ Multivitamins 10 

ml/Chromium/ Copper/Manganese/ Seleni/Zn 1 ml/ Insulin Human Regular 15 unit/ 

Total Parenteral Nutrition/Amino Acids/Dextrose/ Fat Emulsion Intravenous 1,890 

ml @  78.75 mls/ hr TPN  CONT IV  Last administered on 5/16/20at 22:15;  Start 

5/16/20 at 22:00;  Stop 5/17/20 at 21:59;  Status DC


Linezolid/Dextrose 300 ml @  300 mls/hr Q12HR IV  Last administered on 5/19/20at

21:08;  Start 5/17/20 at 09:00;  Stop 5/20/20 at 08:11;  Status DC


Daptomycin 450 mg/ Sodium Chloride 50 ml @  100 mls/hr Q24H IV  Last adm

inistered on 5/20/20at 09:25;  Start 5/17/20 at 09:00;  Stop 5/21/20 at 08:30;  

Status DC


Potassium Chloride 90 meq/ Magnesium Sulfate 20 meq/ Multivitamins 10 

ml/Chromium/ Copper/Manganese/ Seleni/Zn 1 ml/ Insulin Human Regular 15 unit/ 

Total Parenteral Nutrition/Amino Acids/Dextrose/ Fat Emulsion Intravenous 1,890 

ml @  78.75 mls/ hr TPN  CONT IV  Last administered on 5/17/20at 21:34;  Start 

5/17/20 at 22:00;  Stop 5/18/20 at 21:59;  Status DC


Lorazepam (Ativan Inj) 2 mg STK-MED ONCE .ROUTE ;  Start 5/17/20 at 14:58;  Stop

5/17/20 at 14:58;  Status DC


Metoprolol Tartrate (Lopressor Vial) 5 mg 1X  ONCE IVP  Last administered on 5/1 7/20at 15:31;  Start 5/17/20 at 15:15;  Stop 5/17/20 at 15:16;  Status DC


Lorazepam (Ativan Inj) 2 mg 1X  ONCE IVP  Last administered on 5/17/20at 15:30; 

Start 5/17/20 at 15:15;  Stop 5/17/20 at 15:16;  Status DC


Enoxaparin Sodium (Lovenox 40mg Syringe) 40 mg Q24H SQ  Last administered on 

6/5/20at 17:44;  Start 5/17/20 at 17:00;  Stop 6/7/20 at 06:50;  Status DC


Lorazepam (Ativan Inj) 1 mg PRN Q4HRS  PRN IVP ANXIETY / AGITATION MILD-MOD Last

administered on 5/31/20at 15:55;  Start 5/17/20 at 19:15;  Stop 6/2/20 at 11:45;

 Status DC


Lorazepam (Ativan Inj) 2 mg PRN Q4HRS  PRN IVP ANXIETY / AGITATION SEVERE Last 

administered on 6/1/20at 07:55;  Start 5/17/20 at 19:15;  Stop 6/2/20 at 11:45; 

Status DC


Fentanyl Citrate (Fentanyl 2ml Vial) 50 mcg PRN Q4HRS  PRN IVP SEVERE PAIN Last 

administered on 6/13/20at 05:15;  Start 5/18/20 at 13:15;  Stop 6/14/20 at 

09:29;  Status DC


Fentanyl Citrate (Fentanyl 2ml Vial) 25 mcg PRN Q4HRS  PRN IVP MODERATE PAIN 

Last administered on 6/13/20at 00:27;  Start 5/18/20 at 13:15;  Stop 6/14/20 at 

09:30;  Status DC


Potassium Chloride 90 meq/ Magnesium Sulfate 20 meq/ Multivitamins 10 

ml/Chromium/ Copper/Manganese/ Seleni/Zn 1 ml/ Insulin Human Regular 15 unit/ 

Total Parenteral Nutrition/Amino Acids/Dextrose/ Fat Emulsion Intravenous 1,890 

ml @  78.75 mls/ hr TPN  CONT IV  Last administered on 5/18/20at 22:18;  Start 

5/18/20 at 22:00;  Stop 5/19/20 at 21:59;  Status DC


Furosemide (Lasix) 40 mg 1X  ONCE IVP  Last administered on 5/18/20at 21:51;  

Start 5/18/20 at 21:45;  Stop 5/18/20 at 21:48;  Status DC


Albumin Human 100 ml @  100 mls/hr 1X PRN  PRN IV SEE COMMENTS;  Start 5/19/20 

at 01:30


Furosemide (Lasix) 40 mg [id number] IVP  Last administered on 6/3/20at 08:04;  Start 

5/19/20 at 14:00;  Stop 6/3/20 at 13:07;  Status DC


Potassium Chloride 90 meq/ Magnesium Sulfate 20 meq/ Multivitamins 10 

ml/Chromium/ Copper/Manganese/ Seleni/Zn 1 ml/ Insulin Human Regular 15 unit/ 

Total Parenteral Nutrition/Amino Acids/Dextrose/ Fat Emulsion Intravenous 1,800 

ml @  75 mls/hr TPN  CONT IV  Last administered on 5/19/20at 22:31;  Start 

5/19/20 at 22:00;  Stop 5/20/20 at 21:59;  Status DC


Potassium Chloride 90 meq/ Magnesium Sulfate 20 meq/ Multivitamins 10 

ml/Chromium/ Copper/Manganese/ Seleni/Zn 1 ml/ Insulin Human Regular 15 unit/ 

Total Parenteral Nutrition/Amino Acids/Dextrose/ Fat Emulsion Intravenous 1,800 

ml @  75 mls/hr TPN  CONT IV  Last administered on 5/20/20at 22:28;  Start 

5/20/20 at 22:00;  Stop 5/21/20 at 21:59;  Status DC


Potassium Chloride 110 meq/ Magnesium Sulfate 20 meq/ Multivitamins 10 ml/

Chromium/ Copper/Manganese/ Seleni/Zn 1 ml/ Insulin Human Regular 15 unit/ Total

Parenteral Nutrition/Amino Acids/Dextrose/ Fat Emulsion Intravenous 1,800 ml @  

75 mls/hr TPN  CONT IV  Last administered on 5/21/20at 22:01;  Start 5/21/20 at 

22:00;  Stop 5/22/20 at 21:59;  Status DC


Saliva Substitute (Biotene Moisturizing Mouth) 2 spray PRN Q15MIN  PRN PO DRY 

MOUTH;  Start 5/21/20 at 11:00


Potassium Chloride 110 meq/ Magnesium Sulfate 20 meq/ Multivitamins 10 

ml/Chromium/ Copper/Manganese/ Seleni/Zn 1 ml/ Insulin Human Regular 15 unit/ 

Total Parenteral Nutrition/Amino Acids/Dextrose/ Fat Emulsion Intravenous 1,800 

ml @  75 mls/hr TPN  CONT IV  Last administered on 5/22/20at 22:21;  Start 

5/22/20 at 22:00;  Stop 5/23/20 at 21:59;  Status DC


Potassium Chloride 110 meq/ Magnesium Sulfate 20 meq/ Multivitamins 10 

ml/Chromium/ Copper/Manganese/ Seleni/Zn 1 ml/ Insulin Human Regular 15 unit/ 

Total Parenteral Nutrition/Amino Acids/Dextrose/ Fat Emulsion Intravenous 1,800 

ml @  75 mls/hr TPN  CONT IV  Last administered on 5/23/20at 22:04;  Start 

5/23/20 at 22:00;  Stop 5/24/20 at 21:59;  Status DC


Potassium Chloride 110 meq/ Magnesium Sulfate 20 meq/ Multivitamins 10 

ml/Chromium/ Copper/Manganese/ Seleni/Zn 1 ml/ Insulin Human Regular 15 unit/ 

Total Parenteral Nutrition/Amino Acids/Dextrose/ Fat Emulsion Intravenous 1,800 

ml @  75 mls/hr TPN  CONT IV  Last administered on 5/24/20at 22:48;  Start 

5/24/20 at 22:00;  Stop 5/25/20 at 21:59;  Status DC


Potassium Chloride 70 meq/ Magnesium Sulfate 20 meq/ Multivitamins 10 ml/

Chromium/ Copper/Manganese/ Seleni/Zn 1 ml/ Insulin Human Regular 15 unit/ Total

Parenteral Nutrition/Amino Acids/Dextrose/ Fat Emulsion Intravenous 1,800 ml @  

75 mls/hr TPN  CONT IV  Last administered on 5/25/20at 21:39;  Start 5/25/20 at 

22:00;  Stop 5/26/20 at 21:59;  Status DC


Meropenem 500 mg/ Sodium Chloride 50 ml @  100 mls/hr Q6HRS IV  Last 

administered on 5/27/20at 06:02;  Start 5/25/20 at 18:00;  Stop 5/27/20 at 

09:59;  Status DC


Barium Sulfate (Varibar Thin Liquid Apple) 148 gm 1X  ONCE PO ;  Start 5/26/20 

at 11:45;  Stop 5/26/20 at 11:49;  Status DC


Potassium Chloride 70 meq/ Magnesium Sulfate 20 meq/ Multivitamins 10 m

l/Chromium/ Copper/Manganese/ Seleni/Zn 1 ml/ Insulin Human Regular 15 unit/ 

Total Parenteral Nutrition/Amino Acids/Dextrose/ Fat Emulsion Intravenous 1,800 

ml @  75 mls/hr TPN  CONT IV  Last administered on 5/26/20at 22:27;  Start 

5/26/20 at 22:00;  Stop 5/27/20 at 21:59;  Status DC


Piperacillin Sod/ Tazobactam Sod 3.375 gm/Sodium Chloride 50 ml @  100 mls/hr 

Q6HRS IV  Last administered on 6/4/20at 06:10;  Start 5/27/20 at 12:00;  Stop 

6/4/20 at 07:26;  Status DC


Potassium Chloride 70 meq/ Magnesium Sulfate 20 meq/ Multivitamins 10 

ml/Chromium/ Copper/Manganese/ Seleni/Zn 1 ml/ Insulin Human Regular 15 unit/ 

Total Parenteral Nutrition/Amino Acids/Dextrose/ Fat Emulsion Intravenous 1,800 

ml @  75 mls/hr TPN  CONT IV  Last administered on 5/27/20at 22:03;  Start 

5/27/20 at 22:00;  Stop 5/28/20 at 21:59;  Status DC


Potassium Chloride 70 meq/ Magnesium Sulfate 20 meq/ Multivitamins 10 m

l/Chromium/ Copper/Manganese/ Seleni/Zn 1 ml/ Insulin Human Regular 15 unit/ 

Total Parenteral Nutrition/Amino Acids/Dextrose/ Fat Emulsion Intravenous 1,800 

ml @  75 mls/hr TPN  CONT IV  Last administered on 5/28/20at 22:33;  Start 

5/28/20 at 22:00;  Stop 5/29/20 at 21:59;  Status DC


Potassium Chloride 70 meq/ Magnesium Sulfate 20 meq/ Multivitamins 10 

ml/Chromium/ Copper/Manganese/ Seleni/Zn 1 ml/ Insulin Human Regular 15 unit/ 

Total Parenteral Nutrition/Amino Acids/Dextrose/ Fat Emulsion Intravenous 1,800 

ml @  75 mls/hr TPN  CONT IV  Last administered on 5/29/20at 23:13;  Start 

5/29/20 at 22:00;  Stop 5/30/20 at 21:59;  Status DC


Potassium Chloride 80 meq/ Magnesium Sulfate 20 meq/ Multivitamins 10 

ml/Chromium/ Copper/Manganese/ Seleni/Zn 1 ml/ Insulin Human Regular 15 unit/ 

Total Parenteral Nutrition/Amino Acids/Dextrose/ Fat Emulsion Intravenous 1,800 

ml @  75 mls/hr TPN  CONT IV  Last administered on 5/30/20at 22:30;  Start 

5/30/20 at 22:00;  Stop 5/31/20 at 21:59;  Status DC


Potassium Chloride 80 meq/ Magnesium Sulfate 20 meq/ Multivitamins 10 

ml/Chromium/ Copper/Manganese/ Seleni/Zn 1 ml/ Insulin Human Regular 15 unit/ 

Total Parenteral Nutrition/Amino Acids/Dextrose/ Fat Emulsion Intravenous 1,800 

ml @  75 mls/hr TPN  CONT IV  Last administered on 5/31/20at 21:54;  Start 

5/31/20 at 22:00;  Stop 6/1/20 at 21:59;  Status DC


Potassium Chloride/Water 100 ml @  100 mls/hr 1X  ONCE IV  Last administered on 

6/1/20at 10:15;  Start 6/1/20 at 10:00;  Stop 6/1/20 at 10:59;  Status DC


Potassium Chloride 90 meq/ Magnesium Sulfate 20 meq/ Multivitamins 10 

ml/Chromium/ Copper/Manganese/ Seleni/Zn 1 ml/ Insulin Human Regular 20 unit/ 

Total Parenteral Nutrition/Amino Acids/Dextrose/ Fat Emulsion Intravenous 1,800 

ml @  75 mls/hr TPN  CONT IV  Last administered on 6/1/20at 22:28;  Start 6/1/20

at 22:00;  Stop 6/2/20 at 21:59;  Status DC


Potassium Chloride 90 meq/ Magnesium Sulfate 20 meq/ Multivitamins 10 

ml/Chromium/ Copper/Manganese/ Seleni/Zn 1 ml/ Insulin Human Regular 20 unit/ 

Total Parenteral Nutrition/Amino Acids/Dextrose/ Fat Emulsion Intravenous 1,800 

ml @  75 mls/hr TPN  CONT IV  Last administered on 6/2/20at 22:08;  Start 6/2/20

at 22:00;  Stop 6/3/20 at 21:59;  Status DC


Lorazepam (Ativan Inj) 0.25 mg PRN Q4HRS  PRN IVP ANXIETY / AGITATION Last 

administered on 6/13/20at 02:25;  Start 6/3/20 at 07:30


Potassium Chloride 90 meq/ Magnesium Sulfate 20 meq/ Multivitamins 10 

ml/Chromium/ Copper/Manganese/ Seleni/Zn 1 ml/ Insulin Human Regular 20 unit/ 

Total Parenteral Nutrition/Amino Acids/Dextrose/ Fat Emulsion Intravenous 1,800 

ml @  75 mls/hr TPN  CONT IV  Last administered on 6/3/20at 23:13;  Start 6/3/20

at 22:00;  Stop 6/4/20 at 21:59;  Status DC


Furosemide (Lasix) 40 mg DAILY IVP  Last administered on 6/5/20at 11:14;  Start 

6/3/20 at 13:30;  Stop 6/7/20 at 09:12;  Status DC


Fluoxetine HCl (PROzac) 20 mg QHS PEG  Last administered on 6/22/20at 21:42;  

Start 6/4/20 at 21:00


Fentanyl (Duragesic 50mcg/ Hr Patch) 1 patch Q72H TD  Last administered on 

6/4/20at 21:22;  Start 6/4/20 at 21:00;  Stop 6/13/20 at 12:00;  Status DC


Potassium Chloride 40 meq/ Potassium Acetate 60 meq/Magnesium Sulfate 10 meq/ 

Multivitamins 10 ml/Chromium/ Copper/Manganese/ Seleni/Zn 1 ml/ Insulin Human 

Regular 20 unit/ Total Parenteral Nutrition/Amino Acids/Dextrose/ Fat Emulsion 

Intravenous 1,800 ml @  75 mls/hr TPN  CONT IV  Last administered on 6/5/20at 

00:03;  Start 6/4/20 at 22:00;  Stop 6/5/20 at 21:59;  Status DC


Potassium Acetate 80 meq/Magnesium Sulfate 5 meq/ Multivitamins 10 ml/Chromium/ 

Copper/Manganese/ Seleni/Zn 1 ml/ Insulin Human Regular 20 unit/ Total 

Parenteral Nutrition/Amino Acids/Dextrose/ Fat Emulsion Intravenous 1,920 ml @  

80 mls/hr TPN  CONT IV  Last administered on 6/5/20at 21:59;  Start 6/5/20 at 

22:00;  Stop 6/6/20 at 21:59;  Status DC


Potassium Acetate 60 meq/Magnesium Sulfate 5 meq/ Multivitamins 10 ml/Chromium/ 

Copper/Manganese/ Seleni/Zn 1 ml/ Insulin Human Regular 30 unit/ Total 

Parenteral Nutrition/Amino Acids/Dextrose/ Fat Emulsion Intravenous 1,920 ml @  

80 mls/hr TPN  CONT IV  Last administered on 6/6/20at 21:54;  Start 6/6/20 at 

22:00;  Stop 6/7/20 at 21:59;  Status DC


Norepinephrine Bitartrate 8 mg/ Dextrose 258 ml @  13.332 mls/ hr CONT  PRN IV 

PER PROTOCOL Last administered on 6/7/20at 21:46;  Start 6/7/20 at 06:30


Albumin Human 500 ml @  125 mls/hr 1X  ONCE IV  Last administered on 6/7/20at 

08:10;  Start 6/7/20 at 08:15;  Stop 6/7/20 at 12:14;  Status DC


Potassium Acetate 40 meq/Magnesium Sulfate 5 meq/ Multivitamins 10 ml/Chromium/ 

Copper/Manganese/ Seleni/Zn 1 ml/ Insulin Human Regular 30 unit/ Total Lisa

ral Nutrition/Amino Acids/Dextrose/ Fat Emulsion Intravenous 1,920 ml @  80 

mls/hr TPN  CONT IV  Last administered on 6/7/20at 22:23;  Start 6/7/20 at 

22:00;  Stop 6/8/20 at 21:59;  Status DC


Meropenem 1 gm/ Sodium Chloride 100 ml @  200 mls/hr Q8HRS IV ;  Start 6/7/20 at

14:00;  Status Cancel


Meropenem 1 gm/ Sodium Chloride 100 ml @  200 mls/hr Q8HRS IV  Last administered

on 6/7/20at 11:04;  Start 6/7/20 at 10:00;  Stop 6/7/20 at 13:00;  Status DC


Meropenem 1 gm/ Sodium Chloride 100 ml @  200 mls/hr Q12HR IV  Last administered

on 6/23/20at 08:58;  Start 6/7/20 at 21:00


Sodium Chloride 1,000 ml @  1,000 mls/hr 1X  ONCE IV  Last administered on 

6/7/20at 11:06;  Start 6/7/20 at 10:45;  Stop 6/7/20 at 11:44;  Status DC


Micafungin Sodium 100 mg/Dextrose 100 ml @  100 mls/hr Q24H IV  Last 

administered on 6/22/20at 11:07;  Start 6/7/20 at 11:00


Daptomycin 410 mg/ Sodium Chloride 50 ml @  100 mls/hr Q24H IV  Last 

administered on 6/9/20at 13:33;  Start 6/7/20 at 14:00;  Stop 6/10/20 at 08:30; 

Status DC


Midazolam HCl (Versed) 2 mg STK-MED ONCE .ROUTE ;  Start 6/7/20 at 14:47;  Stop 

6/7/20 at 14:48;  Status DC


Fentanyl Citrate (Fentanyl 2ml Vial) 100 mcg STK-MED ONCE .ROUTE ;  Start 6/7/20

at 14:47;  Stop 6/7/20 at 14:48;  Status DC


Flumazenil (Romazicon) 0.5 mg STK-MED ONCE IV ;  Start 6/7/20 at 14:48;  Stop 

6/7/20 at 14:48;  Status DC


Naloxone HCl (Narcan) 0.4 mg STK-MED ONCE .ROUTE ;  Start 6/7/20 at 14:48;  Stop

6/7/20 at 14:48;  Status DC


Lidocaine HCl (Lidocaine 1% 20ml Vial) 20 ml STK-MED ONCE .ROUTE ;  Start 6/7/20

at 14:48;  Stop 6/7/20 at 14:48;  Status DC


Midazolam HCl (Versed) 2 mg 1X  ONCE IV  Last administered on 6/7/20at 15:28;  S

tart 6/7/20 at 15:00;  Stop 6/7/20 at 15:01;  Status DC


Fentanyl Citrate (Fentanyl 2ml Vial) 100 mcg 1X  ONCE IV  Last administered on 

6/7/20at 15:28;  Start 6/7/20 at 15:00;  Stop 6/7/20 at 15:01;  Status DC


Lidocaine HCl (Lidocaine 1% 20ml Vial) 20 ml 1X  ONCE INJ  Last administered on 

6/7/20at 15:30;  Start 6/7/20 at 15:00;  Stop 6/7/20 at 15:01;  Status DC


Sodium Chloride 1,000 ml @  100 mls/hr Q10H IV  Last administered on 6/16/20at 

07:30;  Start 6/7/20 at 20:00;  Stop 6/16/20 at 11:26;  Status DC


Sodium Bicarbonate (Sodium Bicarb Adult 8.4% Syr) 50 meq 1X  ONCE IV  Last 

administered on 6/7/20at 21:47;  Start 6/7/20 at 22:00;  Stop 6/7/20 at 22:01;  

Status DC


Potassium Acetate 40 meq/Magnesium Sulfate 5 meq/ Multivitamins 10 ml/Chromium/ 

Copper/Manganese/ Seleni/Zn 1 ml/ Insulin Human Regular 30 unit/ Total 

Parenteral Nutrition/Amino Acids/Dextrose/ Fat Emulsion Intravenous 1,920 ml @  

80 mls/hr TPN  CONT IV  Last administered on 6/8/20at 22:28;  Start 6/8/20 at 

22:00;  Stop 6/9/20 at 21:59;  Status DC


Sodium Chloride 500 ml @  500 mls/hr 1X  ONCE IV  Last administered on 6/9/20at 

06:39;  Start 6/9/20 at 06:45;  Stop 6/9/20 at 07:44;  Status DC


Potassium Acetate 40 meq/Magnesium Sulfate 5 meq/ Multivitamins 10 ml/Chromium/ 

Copper/Manganese/ Seleni/Zn 1 ml/ Insulin Human Regular 30 unit/ Total 

Parenteral Nutrition/Amino Acids/Dextrose/ Fat Emulsion Intravenous 1,920 ml @  

80 mls/hr TPN  CONT IV  Last administered on 6/9/20at 22:03;  Start 6/9/20 at 

22:00;  Stop 6/10/20 at 21:59;  Status DC


Metoprolol Tartrate (Lopressor Vial) 5 mg PRN Q6HRS  PRN IVP HYPERTENSION Last 

administered on 6/18/20at 10:14;  Start 6/10/20 at 09:00


Potassium Acetate 40 meq/Magnesium Sulfate 5 meq/ Multivitamins 10 ml/Chromium/ 

Copper/Manganese/ Seleni/Zn 1 ml/ Insulin Human Regular 30 unit/ Total 

Parenteral Nutrition/Amino Acids/Dextrose/ Fat Emulsion Intravenous 1,920 ml @  

80 mls/hr TPN  CONT IV  Last administered on 6/10/20at 21:26;  Start 6/10/20 at 

22:00;  Stop 6/11/20 at 21:59;  Status DC


Potassium Acetate 40 meq/Magnesium Sulfate 5 meq/ Multivitamins 10 ml/Chromium/ 

Copper/Manganese/ Seleni/Zn 1 ml/ Insulin Human Regular 30 unit/ Total 

Parenteral Nutrition/Amino Acids/Dextrose/ Fat Emulsion Intravenous 1,920 ml @  

80 mls/hr TPN  CONT IV  Last administered on 6/11/20at 23:23;  Start 6/11/20 at 

22:00;  Stop 6/12/20 at 21:59;  Status DC


Potassium Acetate 40 meq/Magnesium Sulfate 5 meq/ Multivitamins 10 ml/Chromium/ 

Copper/Manganese/ Seleni/Zn 1 ml/ Insulin Human Regular 30 unit/ Total 

Parenteral Nutrition/Amino Acids/Dextrose/ Fat Emulsion Intravenous 1,920 ml @  

80 mls/hr TPN  CONT IV  Last administered on 6/12/20at 21:35;  Start 6/12/20 at 

22:00;  Stop 6/13/20 at 21:59;  Status DC


Furosemide (Lasix) 20 mg 1X  ONCE IVP  Last administered on 6/13/20at 06:26;  

Start 6/13/20 at 06:15;  Stop 6/13/20 at 06:16;  Status DC


Methylprednisolone Sodium Succinate (SOLU-Medrol 125MG VIAL) 125 mg 1X  ONCE IV 

Last administered on 6/13/20at 06:26;  Start 6/13/20 at 06:15;  Stop 6/13/20 at 

06:16;  Status DC


Albuterol/ Ipratropium (Duoneb) 3 ml Q4HRS NEB  Last administered on 6/23/20at 

07:29;  Start 6/13/20 at 08:00


Fentanyl Citrate 30 ml @ 0 mls/hr CONT  PRN IV SEE PROTOCOL Last administered on

6/23/20at 04:50;  Start 6/13/20 at 06:00


Propofol 100 ml @ 0 mls/hr CONT  PRN IV SEE PROTOCOL Last administered on 

6/20/20at 23:50;  Start 6/13/20 at 06:00


Fentanyl Citrate (Fentanyl 2ml Vial) 25 mcg PRN Q1HR  PRN IV SEE COMMENTS;  Sta

rt 6/13/20 at 06:00


Fentanyl Citrate (Fentanyl 2ml Vial) 50 mcg PRN Q1HR  PRN IV SEE COMMENTS;  

Start 6/13/20 at 06:00


Chlorhexidine Gluconate (Peridex) 15 ml BID MM ;  Start 6/13/20 at 09:00;  Stop 

6/13/20 at 07:58;  Status DC


Potassium Acetate 40 meq/Magnesium Sulfate 5 meq/ Multivitamins 10 ml/Chromium/ 

Copper/Manganese/ Seleni/Zn 1 ml/ Insulin Human Regular 30 unit/ Total Paren

teral Nutrition/Amino Acids/Dextrose/ Fat Emulsion Intravenous 1,920 ml @  80 

mls/hr TPN  CONT IV  Last administered on 6/13/20at 21:19;  Start 6/13/20 at 

22:00;  Stop 6/14/20 at 21:59;  Status DC


Acetylcysteine (Mucomyst 20% Resp Treatment) 600 mg BID NEB  Last administered 

on 6/19/20at 09:33;  Start 6/13/20 at 21:00;  Stop 6/19/20 at 10:39;  Status DC


Magnesium Sulfate 100 ml @  25 mls/hr 1X  ONCE IV  Last administered on 

6/13/20at 15:48;  Start 6/13/20 at 15:45;  Stop 6/13/20 at 19:44;  Status DC


Potassium Acetate 40 meq/Magnesium Sulfate 5 meq/ Multivitamins 10 ml/Chromium/ 

Copper/Manganese/ Seleni/Zn 1 ml/ Insulin Human Regular 30 unit/ Total 

Parenteral Nutrition/Amino Acids/Dextrose/ Fat Emulsion Intravenous 1,920 ml @  

80 mls/hr TPN  CONT IV  Last administered on 6/14/20at 21:35;  Start 6/14/20 at 

22:00;  Stop 6/15/20 at 21:59;  Status DC


Potassium Chloride/Water 100 ml @  100 mls/hr Q1H IV  Last administered on 

6/15/20at 08:31;  Start 6/15/20 at 07:00;  Stop 6/15/20 at 08:59;  Status DC


Potassium Acetate 40 meq/Magnesium Sulfate 5 meq/ Multivitamins 10 ml/Chromium/ 

Copper/Manganese/ Seleni/Zn 1 ml/ Insulin Human Regular 30 unit/ Total 

Parenteral Nutrition/Amino Acids/Dextrose/ Fat Emulsion Intravenous 1,920 ml @  

80 mls/hr TPN  CONT IV  Last administered on 6/15/20at 21:54;  Start 6/15/20 at 

22:00;  Stop 6/16/20 at 19:34;  Status DC


Lidocaine HCl (Buffered Lidocaine 1%) 3 ml STK-MED ONCE .ROUTE ;  Start 6/15/20 

at 12:14;  Stop 6/15/20 at 12:14;  Status DC


Lidocaine HCl (Buffered Lidocaine 1%) 3 ml 1X  ONCE IJ  Last administered on 

6/15/20at 13:11;  Start 6/15/20 at 13:00;  Stop 6/15/20 at 13:01;  Status DC


Magnesium Sulfate 50 ml @ 25 mls/hr 1X  ONCE IV ;  Start 6/16/20 at 08:15;  Stop

6/16/20 at 10:14;  Status DC


Potassium Acetate 40 meq/Magnesium Sulfate 10 meq/ Multivitamins 10 ml/Chromium/

Copper/Manganese/ Seleni/Zn 1 ml/ Insulin Human Regular 20 unit/ Total 

Parenteral Nutrition/Amino Acids/Dextrose/ Fat Emulsion Intravenous 1,920 ml @  

80 mls/hr TPN  CONT IV  Last administered on 6/16/20at 21:32;  Start 6/16/20 at 

22:00;  Stop 6/17/20 at 21:59;  Status DC


Potassium Chloride/Water 100 ml @  100 mls/hr Q1H IV  Last administered on 

6/17/20at 09:12;  Start 6/17/20 at 08:00;  Stop 6/17/20 at 09:59;  Status DC


Alteplase, Recombinant (Cathflo For Central Catheter Clearance) 4 mg 1X  ONCE 

INT CAT ;  Start 6/17/20 at 09:15;  Stop 6/17/20 at 09:16;  Status UNV


Alteplase, Recombinant (Cathflo For Central Catheter Clearance) 4 mg 1X  ONCE 

INT CAT ;  Start 6/17/20 at 09:15;  Stop 6/17/20 at 09:16;  Status UNV


Alteplase, Recombinant (Cathflo For Central Catheter Clearance) 4 mg 1X  ONCE 

INT CAT ;  Start 6/17/20 at 09:15;  Stop 6/17/20 at 09:16;  Status UNV


Alteplase, Recombinant 4 mg/ Sodium Chloride 20 ml @ 20 mls/hr 1X  ONCE IV  Last

administered on 6/17/20at 10:10;  Start 6/17/20 at 10:00;  Stop 6/17/20 at 

10:59;  Status DC


Alteplase, Recombinant 4 mg/ Sodium Chloride 20 ml @ 20 mls/hr 1X  ONCE IV  Last

administered on 6/17/20at 10:09;  Start 6/17/20 at 10:00;  Stop 6/17/20 at 

10:59;  Status DC


Alteplase, Recombinant 4 mg/ Sodium Chloride 20 ml @ 20 mls/hr 1X  ONCE IV  Last

administered on 6/17/20at 10:09;  Start 6/17/20 at 10:00;  Stop 6/17/20 at 

10:59;  Status DC


Potassium Acetate 60 meq/Magnesium Sulfate 10 meq/ Multivitamins 10 ml/Chromium/

Copper/Manganese/ Seleni/Zn 1 ml/ Insulin Human Regular 20 unit/ Total 

Parenteral Nutrition/Amino Acids/Dextrose/ Fat Emulsion Intravenous 1,920 ml @  

80 mls/hr TPN  CONT IV  Last administered on 6/17/20at 21:55;  Start 6/17/20 at 

22:00;  Stop 6/18/20 at 21:59;  Status DC


Albumin Human 500 ml @  125 mls/hr 1X  ONCE IV  Last administered on 6/18/20at 

12:01;  Start 6/18/20 at 11:15;  Stop 6/18/20 at 15:14;  Status DC


Sodium Chloride 500 ml @  500 mls/hr 1X  ONCE IV  Last administered on 6/18/20at

13:50;  Start 6/18/20 at 11:15;  Stop 6/18/20 at 12:14;  Status DC


Potassium Acetate 60 meq/Magnesium Sulfate 14 meq/ Multivitamins 10 ml/Chromium/

Copper/Manganese/ Seleni/Zn 1 ml/ Insulin Human Regular 20 unit/ Total 

Parenteral Nutrition/Amino Acids/Dextrose/ Fat Emulsion Intravenous 1,920 ml @  

80 mls/hr TPN  CONT IV  Last administered on 6/18/20at 22:26;  Start 6/18/20 at 

22:00;  Stop 6/19/20 at 21:59;  Status DC


Ciprofloxacin/ Dextrose 200 ml @  200 mls/hr Q12HR IV  Last administered on 

6/23/20at 08:58;  Start 6/18/20 at 21:00


Albumin Human 250 ml @  62.5 mls/hr 1X  ONCE IV  Last administered on 6/19/20at 

11:09;  Start 6/19/20 at 11:00;  Stop 6/19/20 at 14:59;  Status DC


Furosemide (Lasix) 20 mg 1X  ONCE IVP  Last administered on 6/19/20at 14:52;  

Start 6/19/20 at 10:45;  Stop 6/19/20 at 10:49;  Status DC


Potassium Acetate 60 meq/Magnesium Sulfate 14 meq/ Multivitamins 10 ml/Chromium/

Copper/Manganese/ Seleni/Zn 1 ml/ Insulin Human Regular 15 unit/ Total 

Parenteral Nutrition/Amino Acids/Dextrose/ Fat Emulsion Intravenous 1,920 ml @  

80 mls/hr TPN  CONT IV  Last administered on 6/19/20at 22:08;  Start 6/19/20 at 

22:00;  Stop 6/20/20 at 21:59;  Status DC


Potassium Acetate 60 meq/Magnesium Sulfate 14 meq/ Multivitamins 10 ml/Chromium/

Copper/Manganese/ Seleni/Zn 1 ml/ Insulin Human Regular 15 unit/ Total 

Parenteral Nutrition/Amino Acids/Dextrose/ Fat Emulsion Intravenous 1,920 ml @  

80 mls/hr TPN  CONT IV  Last administered on 6/20/20at 22:12;  Start 6/20/20 at 

22:00;  Stop 6/21/20 at 21:59;  Status DC


Potassium Acetate 60 meq/Magnesium Sulfate 14 meq/ Multivitamins 10 ml/Chromium/

Copper/Manganese/ Seleni/Zn 1 ml/ Insulin Human Regular 15 unit/ Total 

Parenteral Nutrition/Amino Acids/Dextrose/ Fat Emulsion Intravenous 1,920 ml @  

80 mls/hr TPN  CONT IV  Last administered on 6/21/20at 22:22;  Start 6/21/20 at 

22:00;  Stop 6/22/20 at 21:59;  Status DC


Furosemide (Lasix) 20 mg 1X  ONCE IVP  Last administered on 6/22/20at 11:07;  

Start 6/22/20 at 10:30;  Stop 6/22/20 at 10:34;  Status DC


Potassium Acetate 60 meq/Magnesium Sulfate 14 meq/ Multivitamins 10 ml/Chromium/

Copper/Manganese/ Seleni/Zn 1 ml/ Insulin Human Regular 15 unit/ Sodium Chloride

20 meq/Total Parenteral Nutrition/Amino Acids/Dextrose/ Fat Emulsion Intravenous

1,920 ml @  80 mls/hr TPN  CONT IV  Last administered on 6/22/20at 21:54;  Start

6/22/20 at 22:00;  Stop 6/23/20 at 21:59





Active Scripts


Active


Reported


Bisoprolol Fumarate 5 Mg Tablet 10 Mg PO DAILY


Vitals/I & O





Vital Sign - Last 24 Hours








 6/22/20 6/22/20 6/22/20 6/22/20





 11:00 11:36 12:00 12:00


 


Temp   98.6 





   98.6 


 


Pulse 115  111 


 


Resp 33  25 


 


B/P (MAP) 137/87 (104)  113/74 (87) 


 


Pulse Ox 98 96 93 


 


O2 Delivery Tracheal Collar Tracheal Collar Tracheal Collar Trach Collar


 


O2 Flow Rate  8.0  


 


    





    





 6/22/20 6/22/20 6/22/20 6/22/20





 13:00 14:00 15:00 15:40


 


Pulse 111 108 111 


 


Resp 19 19 21 


 


B/P (MAP) 113/62 (79) 96/59 (71) 116/75 (89) 


 


Pulse Ox 96 98 98 


 


O2 Delivery Tracheal Collar Tracheal Collar Tracheal Collar Trach Collar





 6/22/20 6/22/20 6/22/20 6/22/20





 16:00 16:05 17:00 18:00


 


Temp 98.5   





 98.5   


 


Pulse 112  111 108


 


Resp 22 20 16


 


B/P (MAP) 105/65 (78)  102/65 (77) 108/74 (85)


 


Pulse Ox 98 97 98 98


 


O2 Delivery Tracheal Collar Tracheal Collar Tracheal Collar Tracheal Collar


 


O2 Flow Rate  8.0  


 


    





    





 6/22/20 6/22/20 6/22/20 6/22/20





 19:00 19:34 20:00 20:00


 


Temp   98.7 





   98.7 


 


Pulse 108  110 


 


Resp 17  20 


 


B/P (MAP) 99/59 (72)  104/60 (75) 


 


Pulse Ox 98 94 91 


 


O2 Delivery Tracheal Collar Tracheal Collar Tracheal Collar Trach Collar


 


O2 Flow Rate  8.0  


 


    





    





 6/22/20 6/22/20 6/22/20 6/22/20





 21:00 22:00 23:00 23:38


 


Pulse 112 112 110 


 


Resp 24 22 22 


 


B/P (MAP) 119/81 (94) 104/62 (76) 123/65 (84) 


 


Pulse Ox 89 99 98 99


 


O2 Delivery Tracheal Collar Tracheal Collar Tracheal Collar Tracheal Collar


 


O2 Flow Rate    8.0





 6/23/20 6/23/20 6/23/20 6/23/20





 00:00 00:02 01:00 02:00


 


Temp  98.8  





  98.8  


 


Pulse  108 108 106


 


Resp  20 18 17


 


B/P (MAP)  114/66 (82) 112/57 (75) 106/61 (76)


 


Pulse Ox  98 98 99


 


O2 Delivery Trach Collar Tracheal Collar Tracheal Collar Tracheal Collar


 


    





    





 6/23/20 6/23/20 6/23/20 6/23/20





 03:00 03:14 04:04 04:07


 


Temp    98.2





    98.2


 


Pulse 104   106


 


Resp 27   17


 


B/P (MAP) 109/57 (74)   112/57 (75)


 


Pulse Ox 99 98  99


 


O2 Delivery Tracheal Collar Tracheal Collar Trach Collar Tracheal Collar


 


O2 Flow Rate  8.0  


 


    





    





 6/23/20 6/23/20 6/23/20 6/23/20





 05:00 06:00 07:00 07:29


 


Pulse 104 108 116 


 


Resp 23 22 22 


 


B/P (MAP) 113/57 (75) 130/93 (105) 119/81 (94) 


 


Pulse Ox 99 94 98 100


 


O2 Delivery Tracheal Collar Tracheal Collar Tracheal Collar Tracheal Collar


 


O2 Flow Rate    8.0





 6/23/20 6/23/20 6/23/20 6/23/20





 08:00 08:00 09:00 10:02


 


Temp  99.2  





  99.2  


 


Pulse  120 116 110


 


Resp  17 22 22


 


B/P (MAP)  114/81 (92) 112/74 (87) 95/71 (79)


 


Pulse Ox  99 98 98


 


O2 Delivery Trach Collar Tracheal Collar Tracheal Collar Tracheal Collar














Intake and Output   


 


 6/22/20 6/22/20 6/23/20





 15:00 23:00 07:00


 


Intake Total 400 ml 3400 ml 985 ml


 


Output Total 1370 ml 765 ml 540 ml


 


Balance -970 ml 2635 ml 445 ml











Hemodynamically unstable?:  No


Is patient in severe pain?:  No


Is NPO status required?:  No











OVIDIO SARAVIA MD          Jun 23, 2020 10:22

## 2020-06-23 NOTE — PDOC
SAURAV NAIR APRN 6/23/20 1006:


SURGICAL PROGRESS NOTE


Subjective


family present


in bed


Vital Signs





Vital Signs








  Date Time  Temp Pulse Resp B/P (MAP) Pulse Ox O2 Delivery O2 Flow Rate FiO2


 


6/23/20 10:02  110 22 95/71 (79) 98 Tracheal Collar  


 


6/23/20 08:00 99.2       





 99.2       


 


6/23/20 07:29       8.0 








I&O











Intake and Output 


 


 6/23/20





 07:00


 


Intake Total 4785 ml


 


Output Total 2675 ml


 


Balance 2110 ml


 


 


 


Intake Oral 0 ml


 


IV Total 4785 ml


 


Output Urine Total 2460 ml


 


Chest Tube Drainage Total 145 ml


 


Drainage Total 70 ml








PATIENT HAS A VILLASENOR:  Yes


General:  Alert


HEENT:  Other (NG present)


Lungs:  Other (trach)


Abdomen:  Soft


Labs





Laboratory Tests








Test


 6/21/20


11:51 6/21/20


18:25 6/22/20


01:12 6/22/20


11:17


 


Glucose (Fingerstick)


 171 mg/dL


(70-99) 140 mg/dL


(70-99) 133 mg/dL


(70-99) 183 mg/dL


(70-99)


 


Test


 6/22/20


17:15 6/23/20


06:28 


 





 


Glucose (Fingerstick)


 149 mg/dL


(70-99) 101 mg/dL


(70-99) 


 





 


Sodium Level


 


 136 mmol/L


(136-145) 


 





 


Potassium Level


 


 4.3 mmol/L


(3.5-5.1) 


 





 


Chloride Level


 


 99 mmol/L


() 


 





 


Carbon Dioxide Level


 


 33 mmol/L


(21-32) 


 





 


Anion Gap  4 (6-14)   


 


Blood Urea Nitrogen


 


 15 mg/dL


(7-20) 


 





 


Creatinine


 


 0.7 mg/dL


(0.6-1.0) 


 





 


Estimated GFR


(Cockcroft-Gault) 


 88.9 


 


 





 


Glucose Level


 


 109 mg/dL


(70-99) 


 





 


Calcium Level


 


 10.1 mg/dL


(8.5-10.1) 


 





 


Triglycerides Level


 


 174 mg/dL


(0-150) 


 











Laboratory Tests








Test


 6/22/20


11:17 6/22/20


17:15 6/23/20


06:28


 


Glucose (Fingerstick)


 183 mg/dL


(70-99) 149 mg/dL


(70-99) 101 mg/dL


(70-99)


 


Sodium Level


 


 


 136 mmol/L


(136-145)


 


Potassium Level


 


 


 4.3 mmol/L


(3.5-5.1)


 


Chloride Level


 


 


 99 mmol/L


()


 


Carbon Dioxide Level


 


 


 33 mmol/L


(21-32)


 


Anion Gap   4 (6-14) 


 


Blood Urea Nitrogen


 


 


 15 mg/dL


(7-20)


 


Creatinine


 


 


 0.7 mg/dL


(0.6-1.0)


 


Estimated GFR


(Cockcroft-Gault) 


 


 88.9 





 


Glucose Level


 


 


 109 mg/dL


(70-99)


 


Calcium Level


 


 


 10.1 mg/dL


(8.5-10.1)


 


Triglycerides Level


 


 


 174 mg/dL


(0-150)








Problem List


Problems


Medical Problems:


(1) Acute pancreatitis


Status: Acute  





(2) Cholelithiasis


Status: Acute  








Assessment/Plan


stable


supportive care


surgery plans with Dr Flores--tentatively next week





Justicifation of Admission Dx:


Justifications for Admission:


Justification of Admission Dx:  Yes





GAMAL FLORES MD 6/23/20 1351:


SURGICAL PROGRESS NOTE


Assessment/Plan


Pt seen and examined.


Agree with Ms. Nair's note


Pt relatively comfortable


abd soft, drains with pancreatic necrosis


plan OR 6/30.


D/w pt and pt's mother extensively.  R/R/B/A d/w them.











SAURAV NAIR            Jun 23, 2020 10:06


GAMAL FLORES MD             Jun 23, 2020 13:51

## 2020-06-23 NOTE — PDOC
Objective:


Objective:


D/w nurse - stable.


Reviewed chart - tentative surgery next week.


Vital Signs:





                                   Vital Signs








  Date Time  Temp Pulse Resp B/P (MAP) Pulse Ox O2 Delivery O2 Flow Rate FiO2


 


6/23/20 12:27      Tracheal Collar 8.0 


 


6/23/20 10:02  110 22 95/71 (79) 98   


 


6/23/20 08:00 99.2       





 99.2       








Labs:





Laboratory Tests








Test


 6/22/20


17:15 6/23/20


06:28 6/23/20


12:22


 


Glucose (Fingerstick) 149 mg/dL  101 mg/dL  156 mg/dL 


 


Sodium Level  136 mmol/L  


 


Potassium Level  4.3 mmol/L  


 


Chloride Level  99 mmol/L  


 


Carbon Dioxide Level  33 mmol/L  


 


Anion Gap  4  


 


Blood Urea Nitrogen  15 mg/dL  


 


Creatinine  0.7 mg/dL  


 


Estimated GFR


(Cockcroft-Gault) 


 88.9 


 





 


Glucose Level  109 mg/dL  


 


Calcium Level  10.1 mg/dL  


 


Triglycerides Level  174 mg/dL  





  BLOOD CULTURE  Preliminary  


        NO GROWTH AFTER 4 DAYS





PE:





GEN: NAD - up in chair, mother present


LUNGS: trach collar


HEART: tachycardic


NEURO/PSYCH: awake and alert





A/P:


Complicated pancreatitis





--


Supportive care.





Justicifation of Admission Dx:


Justifications for Admission:


Justification of Admission Dx:  Yes











RAGHAVENDRA MURCIA         Jun 23, 2020 12:46

## 2020-06-23 NOTE — NUR
SS following up with discharge planning. SS reviewed pt chart and discussed with pt RN. Pt 
has tentative surgery scheduled for next week. Pt currently on trach collar, TPN, and IV 
Meropenem, Micafungin, and Cipro. Pt has drains x3 and chest tube. Pt max assist and 
dependent. Pt is self pay pt. SS will continue to follow for discharge planning.

## 2020-06-23 NOTE — PDOC
PULMONARY PROGRESS NOTES


Subjective


off vent ,ct drainage <200 cc/24 hrs 


Patient intubated on 3/23 , s/p trach 4/6, 


transferred back to ICU 6/5 for syncope with collapse


developed anemia, blood drainage from RLQ abdomen drain site, and surrounding 

firmness  / developed septic shock 6/7 from abdomen source, required levo 6/7


s/p 3 new drains 6/7 with brown color drainage/ oozing fluid around drains


s/p left chest tube


Vitals





Vital Signs








  Date Time  Temp Pulse Resp B/P (MAP) Pulse Ox O2 Delivery O2 Flow Rate FiO2


 


6/23/20 07:29     100 Tracheal Collar 8.0 


 


6/23/20 07:00  116 22 119/81 (94)    


 


6/23/20 04:07 98.2       





 98.2       








Comments


awake,no soa


General:  Alert, No acute distress


HEENT:  Other (nc at perrl   nose clear    neck  trach site ok   no lab  no 

thyromegaly)


Lungs:  Other (diminshed in bases, Rhonci in LLL)


Cardiovascular:  S1, S2


Abdomen:  Soft, Non-tender, Other (bleeding from Sx. site RLQ and firmness)


Extremities:  Other (+1 BLE edema)


Skin:  Warm, Dry


Labs





Laboratory Tests








Test


 6/21/20


11:51 6/21/20


18:25 6/22/20


01:12 6/22/20


11:17


 


Glucose (Fingerstick)


 171 mg/dL


(70-99) 140 mg/dL


(70-99) 133 mg/dL


(70-99) 183 mg/dL


(70-99)


 


Test


 6/22/20


17:15 6/23/20


06:28 


 





 


Glucose (Fingerstick)


 149 mg/dL


(70-99) 101 mg/dL


(70-99) 


 





 


Sodium Level


 


 136 mmol/L


(136-145) 


 





 


Potassium Level


 


 4.3 mmol/L


(3.5-5.1) 


 





 


Chloride Level


 


 99 mmol/L


() 


 





 


Carbon Dioxide Level


 


 33 mmol/L


(21-32) 


 





 


Anion Gap  4 (6-14)   


 


Blood Urea Nitrogen


 


 15 mg/dL


(7-20) 


 





 


Creatinine


 


 0.7 mg/dL


(0.6-1.0) 


 





 


Estimated GFR


(Cockcroft-Gault) 


 88.9 


 


 





 


Glucose Level


 


 109 mg/dL


(70-99) 


 





 


Calcium Level


 


 10.1 mg/dL


(8.5-10.1) 


 





 


Triglycerides Level


 


 174 mg/dL


(0-150) 


 











Laboratory Tests








Test


 6/22/20


11:17 6/22/20


17:15 6/23/20


06:28


 


Glucose (Fingerstick)


 183 mg/dL


(70-99) 149 mg/dL


(70-99) 101 mg/dL


(70-99)


 


Sodium Level


 


 


 136 mmol/L


(136-145)


 


Potassium Level


 


 


 4.3 mmol/L


(3.5-5.1)


 


Chloride Level


 


 


 99 mmol/L


()


 


Carbon Dioxide Level


 


 


 33 mmol/L


(21-32)


 


Anion Gap   4 (6-14) 


 


Blood Urea Nitrogen


 


 


 15 mg/dL


(7-20)


 


Creatinine


 


 


 0.7 mg/dL


(0.6-1.0)


 


Estimated GFR


(Cockcroft-Gault) 


 


 88.9 





 


Glucose Level


 


 


 109 mg/dL


(70-99)


 


Calcium Level


 


 


 10.1 mg/dL


(8.5-10.1)


 


Triglycerides Level


 


 


 174 mg/dL


(0-150)








Medications





Active Scripts








 Medications  Dose


 Route/Sig


 Max Daily Dose Days Date Category


 


 Bisoprolol


 Fumarate 5 Mg


 Tablet  10 Mg


 PO DAILY


   3/16/20 Reported








Comments


CXR 6/22


poor insp effort/ increase effusion


 











ct abdomen /pelvis 6/6


1. Removal of the percutaneous pigtail drainage catheters since the prior 


exam. Sequela of pancreatitis with extensive pseudocysts again 


demonstrated, the right-sided collections are slightly larger since the 


prior exam, the left-sided collections are stable. See above.


2. Moderate to large left pleural effusion with atelectasis and collapse 


of most of the left lower lobe, stable. Small right pleural effusion is 


stable.


3. Gallstone.





ct chest 6/15 reviewed








 GRAM NEG COCCOBACILLI:MANY


        SQUAMOUS EPI CELL:RARE


        PMN (WBCs):FEW


        Unless otherwise specified, Testing Performed by:


        [facility]


        [address] [place] [address]


        For Inquires, the Physician may contact the Microbiology


        department at [phone]





  RESPIRATORY CULTURE  Final  


        Final





        MANY GRAM NEGATIVE RODS on 06/15/20 at 1101


        FINAL ID= [PSEUDOMONAS AERUGINOSA]


        MICRO CHARGES


        PSEUDOMONAS AERUGINOSA





  ANTIMICROBIAL SUSCEPTIBILITY  Final  


        Comment





        NEG ANSON 56


        PSEUDOMONAS AERUGINOSA


        ANTIBIOTIC                        RESULT          INTERPRETATION


        AMIKACIN                          <=16                  S


        AZTREONAM                         <=4                   S


        CEFTAZIDIME                       <=1                   S


        CIPROFLOXACIN                     <=0.25                S


        CEFEPIME                          <=2                   S


        CEFTAZIDIME/AVIBACTAM             <=4                   S


        GENTAMICIN                        <=2                   S


        LEVOFLOXACIN                      <=0.5                 S





Impression


.


IMPRESSION:


1.  Acute hypoxemic respiratory failure secondary to ARDS status post trach, 

developed anemia 6/7, blood drainage from RLQ abdomen drain site, and 

surrounding firmness  / developed septic shock 6/7 from abdomen source, required

levo 6/7


s/p 3 new drains 6/7 with brown color drainage,  on/off vent , on TS now


2.  Gallstone pancreatitis, now with ongoing bleeding from prior drain. Anemic. 

s/p Tx multiple units over several days


3.  septic shock/sepsis, recurrent 6/7, source abdomen. , off levo now, 


4.  Acute kidney injury-, Off HD--renal function decling. suspect JED on CKD due

to hypotension , improved now


5.  Acute gallstone pancreatitis.


6.  Hypoalbuminemia.


7.  Moderate persistent effusions, s/p left thora  5/12, reaccumulation of left 

effusion. O2 requirement not changed. 


8.  Fever- ,hypotension. suspect recurrent sepsis/ likely pancreatic source.  

Per ID, per surgery--


9.  Chronic anemia-- ongoing / s/p PRBC


10. Covid 19 testing negative


11. Moderate to large ascites-S/P paracentisis


12.S/P paracentisis with 4 liters removed on 4/15/20


13. S/P IR drain placement on 5/8/2020, removal, re inserted 6/7


14. Depression/Anxiety 


15. Increase effusion, ? loculated/ s/p chest tube.. drainage slowing down. <200

cc /24 hr





Plan


.


1. TS as tolerated  titrate fio2 to keep sat 94%, off  vent as much as she can 

tolerate


2. Follow all cultures/ PSA from pelvic drains. Abx per ID/  thoracentesis 

result transudate, await c/s,


3. Follow surgery recs-- Acute pancreatitis with persistent necrosis ---- 4/27 

status post ROBERT drain placement + C paropsilosis; 5/6 + yeast & high amylase; s/p

additional drains on 5/8, removal of drains and now increase pseudocyst and 

increase fluid collection. likely infected fluid/ sepsis. on BS abx.follow 

surgery rec, planning surgical intervention next few weeks


4. Follow ID recs for ABX----


5. Follow nephrology recs -----


6. Continue TPN 


7. monitor HGB,  4 units since 6/6--monitor HGB transfuse if less than 8.5 


8 s/p  thoracentesis, 5/12, 3 litres removed, s/p ct on 6/15. ct drainage, 180 

cc over the past 24 hrs,  suction -40, am cxr, flushed chest tube 6/22


9. Pt remains critically ill from severe necrotic pancreatis. Even with surgery 

prognosis grim. Surgery informed family.


10. lasix/albumin prn


11.  sputum gram neg cocobacilli. pan sensitive





     


DVT/GI PPX: protonix--- holding lovenox 2/2 anemia


PT/OT


D/W RN, rt











SHARYN SOLORZANO MD                 Jun 23, 2020 09:09

## 2020-06-23 NOTE — NUR
Pharmacy TPN Dosing Note



S: JESENIA BEAN is a 49 year old F Currently receiving Central Continuous TPN started 
03/18/20



B:Pertinent PMH: Necrotizing pancreatitis

Height: 5 feet, 8 inches

Weight: 87.687581 kg



Current diet: NPO 



LABS:

Sodium:    136 

Potassium: 4.3 

Chloride:  99 

Calcium:   10.1 

Corrected Calcium: 12.50 

Magnesium: 1.9 

CO2:       33 

SCr:       0.7 

Glucose:   101-156 

Albumin:   1.0 

AST:       17 

ALT:       18 



TPN FORMULA:

TPN TYPE:  Central Continuous

AMINO ACIDS:         80 gm

DEXTROSE:            250 gm

LIPIDS:              20 gm

SODIUM CHLORIDE:     20 mEq

POTASSIUM CHLORIDE:  30 mEq

POTASSIUM ACETATE:   30 mEq

MAGNESIUM:           14 mEq

INSULIN:             15 units

MULTIPLE VITAMIN:    10 ml

TRACE ELEMENTS:      1 ml(s)



TPN PLAN:  

Na stable. TCO2 elevated- change to 30 meq K acetate and 30 meq K cloride.

BG variable- continue insulin as is - no change needed to lipids.

-BMP, Mag and Phos in AM.





R: Change TPN as noted above.

Will monitor electrolytes, glucose, and tolerance to TPN.



 LORENZO LESLIE RPH, 06/23/20 [id number]

## 2020-06-24 VITALS — SYSTOLIC BLOOD PRESSURE: 158 MMHG | DIASTOLIC BLOOD PRESSURE: 95 MMHG

## 2020-06-24 VITALS — SYSTOLIC BLOOD PRESSURE: 105 MMHG | DIASTOLIC BLOOD PRESSURE: 75 MMHG

## 2020-06-24 VITALS — SYSTOLIC BLOOD PRESSURE: 106 MMHG | DIASTOLIC BLOOD PRESSURE: 64 MMHG

## 2020-06-24 VITALS — DIASTOLIC BLOOD PRESSURE: 71 MMHG | SYSTOLIC BLOOD PRESSURE: 103 MMHG

## 2020-06-24 VITALS — SYSTOLIC BLOOD PRESSURE: 94 MMHG | DIASTOLIC BLOOD PRESSURE: 58 MMHG

## 2020-06-24 VITALS — DIASTOLIC BLOOD PRESSURE: 69 MMHG | SYSTOLIC BLOOD PRESSURE: 102 MMHG

## 2020-06-24 VITALS — DIASTOLIC BLOOD PRESSURE: 82 MMHG | SYSTOLIC BLOOD PRESSURE: 97 MMHG

## 2020-06-24 VITALS — DIASTOLIC BLOOD PRESSURE: 75 MMHG | SYSTOLIC BLOOD PRESSURE: 120 MMHG

## 2020-06-24 VITALS — SYSTOLIC BLOOD PRESSURE: 123 MMHG | DIASTOLIC BLOOD PRESSURE: 72 MMHG

## 2020-06-24 VITALS — DIASTOLIC BLOOD PRESSURE: 76 MMHG | SYSTOLIC BLOOD PRESSURE: 113 MMHG

## 2020-06-24 VITALS — SYSTOLIC BLOOD PRESSURE: 118 MMHG | DIASTOLIC BLOOD PRESSURE: 69 MMHG

## 2020-06-24 VITALS — SYSTOLIC BLOOD PRESSURE: 110 MMHG | DIASTOLIC BLOOD PRESSURE: 69 MMHG

## 2020-06-24 VITALS — DIASTOLIC BLOOD PRESSURE: 63 MMHG | SYSTOLIC BLOOD PRESSURE: 109 MMHG

## 2020-06-24 VITALS — DIASTOLIC BLOOD PRESSURE: 66 MMHG | SYSTOLIC BLOOD PRESSURE: 115 MMHG

## 2020-06-24 VITALS — DIASTOLIC BLOOD PRESSURE: 57 MMHG | SYSTOLIC BLOOD PRESSURE: 117 MMHG

## 2020-06-24 VITALS — DIASTOLIC BLOOD PRESSURE: 80 MMHG | SYSTOLIC BLOOD PRESSURE: 125 MMHG

## 2020-06-24 LAB
ALBUMIN SERPL-MCNC: 1.1 G/DL (ref 3.4–5)
ALBUMIN/GLOB SERPL: 0.3 {RATIO} (ref 1–1.7)
ALP SERPL-CCNC: 255 U/L (ref 46–116)
ALT SERPL-CCNC: 20 U/L (ref 14–59)
ANION GAP SERPL CALC-SCNC: 2 MMOL/L (ref 6–14)
AST SERPL-CCNC: 23 U/L (ref 15–37)
BASOPHILS # BLD AUTO: 0 X10^3/UL (ref 0–0.2)
BASOPHILS NFR BLD: 0 % (ref 0–3)
BILIRUB SERPL-MCNC: 0.4 MG/DL (ref 0.2–1)
BUN SERPL-MCNC: 13 MG/DL (ref 7–20)
BUN/CREAT SERPL: 19 (ref 6–20)
CALCIUM SERPL-MCNC: 10.4 MG/DL (ref 8.5–10.1)
CHLORIDE SERPL-SCNC: 99 MMOL/L (ref 98–107)
CO2 SERPL-SCNC: 34 MMOL/L (ref 21–32)
CREAT SERPL-MCNC: 0.7 MG/DL (ref 0.6–1)
EOSINOPHIL NFR BLD: 0.1 X10^3/UL (ref 0–0.7)
EOSINOPHIL NFR BLD: 1 % (ref 0–3)
ERYTHROCYTE [DISTWIDTH] IN BLOOD BY AUTOMATED COUNT: 17.5 % (ref 11.5–14.5)
GFR SERPLBLD BASED ON 1.73 SQ M-ARVRAT: 88.9 ML/MIN
GLOBULIN SER-MCNC: 3.7 G/DL (ref 2.2–3.8)
GLUCOSE SERPL-MCNC: 130 MG/DL (ref 70–99)
HCT VFR BLD CALC: 29 % (ref 36–47)
HGB BLD-MCNC: 9.4 G/DL (ref 12–15.5)
LYMPHOCYTES # BLD: 1.5 X10^3/UL (ref 1–4.8)
LYMPHOCYTES NFR BLD AUTO: 11 % (ref 24–48)
MAGNESIUM SERPL-MCNC: 2.1 MG/DL (ref 1.8–2.4)
MCH RBC QN AUTO: 28 PG (ref 25–35)
MCHC RBC AUTO-ENTMCNC: 33 G/DL (ref 31–37)
MCV RBC AUTO: 85 FL (ref 79–100)
MONO #: 0.8 X10^3/UL (ref 0–1.1)
MONOCYTES NFR BLD: 6 % (ref 0–9)
NEUT #: 10.8 X10^3/UL (ref 1.8–7.7)
NEUTROPHILS NFR BLD AUTO: 81 % (ref 31–73)
PHOSPHATE SERPL-MCNC: 4.2 MG/DL (ref 2.6–4.7)
PLATELET # BLD AUTO: 449 X10^3/UL (ref 140–400)
POTASSIUM SERPL-SCNC: 4.3 MMOL/L (ref 3.5–5.1)
PROT SERPL-MCNC: 4.8 G/DL (ref 6.4–8.2)
PROTHROMBIN TIME: 14 SEC (ref 11.7–14)
RBC # BLD AUTO: 3.41 X10^6/UL (ref 3.5–5.4)
SODIUM SERPL-SCNC: 135 MMOL/L (ref 136–145)
WBC # BLD AUTO: 13.2 X10^3/UL (ref 4–11)

## 2020-06-24 RX ADMIN — INSULIN LISPRO SCH UNITS: 100 INJECTION, SOLUTION INTRAVENOUS; SUBCUTANEOUS at 00:00

## 2020-06-24 RX ADMIN — PROCHLORPERAZINE EDISYLATE PRN MG: 5 INJECTION INTRAMUSCULAR; INTRAVENOUS at 02:49

## 2020-06-24 RX ADMIN — PANTOPRAZOLE SODIUM SCH MG: 40 INJECTION, POWDER, FOR SOLUTION INTRAVENOUS at 08:05

## 2020-06-24 RX ADMIN — IPRATROPIUM BROMIDE AND ALBUTEROL SULFATE SCH ML: .5; 3 SOLUTION RESPIRATORY (INHALATION) at 12:14

## 2020-06-24 RX ADMIN — INSULIN LISPRO SCH UNITS: 100 INJECTION, SOLUTION INTRAVENOUS; SUBCUTANEOUS at 18:05

## 2020-06-24 RX ADMIN — IPRATROPIUM BROMIDE AND ALBUTEROL SULFATE SCH ML: .5; 3 SOLUTION RESPIRATORY (INHALATION) at 07:25

## 2020-06-24 RX ADMIN — INSULIN LISPRO SCH UNITS: 100 INJECTION, SOLUTION INTRAVENOUS; SUBCUTANEOUS at 06:00

## 2020-06-24 RX ADMIN — INSULIN LISPRO SCH UNITS: 100 INJECTION, SOLUTION INTRAVENOUS; SUBCUTANEOUS at 12:00

## 2020-06-24 RX ADMIN — DILTIAZEM HYDROCHLORIDE SCH MLS/HR: 5 INJECTION INTRAVENOUS at 20:58

## 2020-06-24 RX ADMIN — DILTIAZEM HYDROCHLORIDE SCH MLS/HR: 5 INJECTION INTRAVENOUS at 08:05

## 2020-06-24 RX ADMIN — DILTIAZEM HYDROCHLORIDE SCH MLS/HR: 5 INJECTION INTRAVENOUS at 12:34

## 2020-06-24 RX ADMIN — IPRATROPIUM BROMIDE AND ALBUTEROL SULFATE SCH ML: .5; 3 SOLUTION RESPIRATORY (INHALATION) at 19:58

## 2020-06-24 RX ADMIN — IPRATROPIUM BROMIDE AND ALBUTEROL SULFATE SCH ML: .5; 3 SOLUTION RESPIRATORY (INHALATION) at 04:00

## 2020-06-24 RX ADMIN — IPRATROPIUM BROMIDE AND ALBUTEROL SULFATE SCH ML: .5; 3 SOLUTION RESPIRATORY (INHALATION) at 16:05

## 2020-06-24 RX ADMIN — PROCHLORPERAZINE EDISYLATE PRN MG: 5 INJECTION INTRAMUSCULAR; INTRAVENOUS at 11:56

## 2020-06-24 RX ADMIN — Medication PRN EACH: at 09:46

## 2020-06-24 RX ADMIN — CIPROFLOXACIN SCH MLS/HR: 2 INJECTION, SOLUTION INTRAVENOUS at 20:57

## 2020-06-24 RX ADMIN — CIPROFLOXACIN SCH MLS/HR: 2 INJECTION, SOLUTION INTRAVENOUS at 08:05

## 2020-06-24 NOTE — PDOC
SURGICAL PROGRESS NOTE


Subjective


Pt sleeping in chair


Vital Signs





Vital Signs








  Date Time  Temp Pulse Resp B/P (MAP) Pulse Ox O2 Delivery O2 Flow Rate FiO2


 


6/24/20 12:13     97 Tracheal Collar 8.0 


 


6/24/20 12:00 99.0 123 22 120/75 (90)    





 99.0       








I&O











Intake and Output 


 


 6/24/20





 07:00


 


Intake Total 1731.4 ml


 


Output Total 2205 ml


 


Balance -473.6 ml


 


 


 


IV Total 1731.4 ml


 


Output Urine Total 1705 ml


 


Stool Total 200 ml


 


Chest Tube Drainage Total 255 ml


 


Drainage Total 45 ml








General:  No acute distress


Labs





Laboratory Tests








Test


 6/22/20


17:15 6/23/20


06:28 6/23/20


12:22 6/23/20


17:01


 


Glucose (Fingerstick)


 149 mg/dL


(70-99) 101 mg/dL


(70-99) 156 mg/dL


(70-99) 157 mg/dL


(70-99)


 


Sodium Level


 


 136 mmol/L


(136-145) 


 





 


Potassium Level


 


 4.3 mmol/L


(3.5-5.1) 


 





 


Chloride Level


 


 99 mmol/L


() 


 





 


Carbon Dioxide Level


 


 33 mmol/L


(21-32) 


 





 


Anion Gap  4 (6-14)   


 


Blood Urea Nitrogen


 


 15 mg/dL


(7-20) 


 





 


Creatinine


 


 0.7 mg/dL


(0.6-1.0) 


 





 


Estimated GFR


(Cockcroft-Gault) 


 88.9 


 


 





 


Glucose Level


 


 109 mg/dL


(70-99) 


 





 


Calcium Level


 


 10.1 mg/dL


(8.5-10.1) 


 





 


Triglycerides Level


 


 174 mg/dL


(0-150) 


 





 


Test


 6/24/20


00:36 6/24/20


06:15 6/24/20


06:20 





 


Glucose (Fingerstick)


 141 mg/dL


(70-99) 


 133 mg/dL


(70-99) 





 


White Blood Count


 


 13.2 x10^3/uL


(4.0-11.0) 


 





 


Red Blood Count


 


 3.41 x10^6/uL


(3.50-5.40) 


 





 


Hemoglobin


 


 9.4 g/dL


(12.0-15.5) 


 





 


Hematocrit


 


 29.0 %


(36.0-47.0) 


 





 


Mean Corpuscular Volume  85 fL ()   


 


Mean Corpuscular Hemoglobin  28 pg (25-35)   


 


Mean Corpuscular Hemoglobin


Concent 


 33 g/dL


(31-37) 


 





 


Red Cell Distribution Width


 


 17.5 %


(11.5-14.5) 


 





 


Platelet Count


 


 449 x10^3/uL


(140-400) 


 





 


Neutrophils (%) (Auto)  81 % (31-73)   


 


Lymphocytes (%) (Auto)  11 % (24-48)   


 


Monocytes (%) (Auto)  6 % (0-9)   


 


Eosinophils (%) (Auto)  1 % (0-3)   


 


Basophils (%) (Auto)  0 % (0-3)   


 


Neutrophils # (Auto)


 


 10.8 x10^3/uL


(1.8-7.7) 


 





 


Lymphocytes # (Auto)


 


 1.5 x10^3/uL


(1.0-4.8) 


 





 


Monocytes # (Auto)


 


 0.8 x10^3/uL


(0.0-1.1) 


 





 


Eosinophils # (Auto)


 


 0.1 x10^3/uL


(0.0-0.7) 


 





 


Basophils # (Auto)


 


 0.0 x10^3/uL


(0.0-0.2) 


 





 


Prothrombin Time


 


 14.0 SEC


(11.7-14.0) 


 





 


Prothromb Time International


Ratio 


 1.1 (0.8-1.1) 


 


 





 


Sodium Level


 


 135 mmol/L


(136-145) 


 





 


Potassium Level


 


 4.3 mmol/L


(3.5-5.1) 


 





 


Chloride Level


 


 99 mmol/L


() 


 





 


Carbon Dioxide Level


 


 34 mmol/L


(21-32) 


 





 


Anion Gap  2 (6-14)   


 


Blood Urea Nitrogen


 


 13 mg/dL


(7-20) 


 





 


Creatinine


 


 0.7 mg/dL


(0.6-1.0) 


 





 


Estimated GFR


(Cockcroft-Gault) 


 88.9 


 


 





 


BUN/Creatinine Ratio  19 (6-20)   


 


Glucose Level


 


 130 mg/dL


(70-99) 


 





 


Calcium Level


 


 10.4 mg/dL


(8.5-10.1) 


 





 


Phosphorus Level


 


 4.2 mg/dL


(2.6-4.7) 


 





 


Magnesium Level


 


 2.1 mg/dL


(1.8-2.4) 


 





 


Total Bilirubin


 


 0.4 mg/dL


(0.2-1.0) 


 





 


Aspartate Amino Transf


(AST/SGOT) 


 23 U/L (15-37) 


 


 





 


Alanine Aminotransferase


(ALT/SGPT) 


 20 U/L (14-59) 


 


 





 


Alkaline Phosphatase


 


 255 U/L


() 


 





 


Total Protein


 


 4.8 g/dL


(6.4-8.2) 


 





 


Albumin


 


 1.1 g/dL


(3.4-5.0) 


 





 


Albumin/Globulin Ratio  0.3 (1.0-1.7)   








Laboratory Tests








Test


 6/23/20


17:01 6/24/20


00:36 6/24/20


06:15 6/24/20


06:20


 


Glucose (Fingerstick)


 157 mg/dL


(70-99) 141 mg/dL


(70-99) 


 133 mg/dL


(70-99)


 


White Blood Count


 


 


 13.2 x10^3/uL


(4.0-11.0) 





 


Red Blood Count


 


 


 3.41 x10^6/uL


(3.50-5.40) 





 


Hemoglobin


 


 


 9.4 g/dL


(12.0-15.5) 





 


Hematocrit


 


 


 29.0 %


(36.0-47.0) 





 


Mean Corpuscular Volume   85 fL ()  


 


Mean Corpuscular Hemoglobin   28 pg (25-35)  


 


Mean Corpuscular Hemoglobin


Concent 


 


 33 g/dL


(31-37) 





 


Red Cell Distribution Width


 


 


 17.5 %


(11.5-14.5) 





 


Platelet Count


 


 


 449 x10^3/uL


(140-400) 





 


Neutrophils (%) (Auto)   81 % (31-73)  


 


Lymphocytes (%) (Auto)   11 % (24-48)  


 


Monocytes (%) (Auto)   6 % (0-9)  


 


Eosinophils (%) (Auto)   1 % (0-3)  


 


Basophils (%) (Auto)   0 % (0-3)  


 


Neutrophils # (Auto)


 


 


 10.8 x10^3/uL


(1.8-7.7) 





 


Lymphocytes # (Auto)


 


 


 1.5 x10^3/uL


(1.0-4.8) 





 


Monocytes # (Auto)


 


 


 0.8 x10^3/uL


(0.0-1.1) 





 


Eosinophils # (Auto)


 


 


 0.1 x10^3/uL


(0.0-0.7) 





 


Basophils # (Auto)


 


 


 0.0 x10^3/uL


(0.0-0.2) 





 


Prothrombin Time


 


 


 14.0 SEC


(11.7-14.0) 





 


Prothromb Time International


Ratio 


 


 1.1 (0.8-1.1) 


 





 


Sodium Level


 


 


 135 mmol/L


(136-145) 





 


Potassium Level


 


 


 4.3 mmol/L


(3.5-5.1) 





 


Chloride Level


 


 


 99 mmol/L


() 





 


Carbon Dioxide Level


 


 


 34 mmol/L


(21-32) 





 


Anion Gap   2 (6-14)  


 


Blood Urea Nitrogen


 


 


 13 mg/dL


(7-20) 





 


Creatinine


 


 


 0.7 mg/dL


(0.6-1.0) 





 


Estimated GFR


(Cockcroft-Gault) 


 


 88.9 


 





 


BUN/Creatinine Ratio   19 (6-20)  


 


Glucose Level


 


 


 130 mg/dL


(70-99) 





 


Calcium Level


 


 


 10.4 mg/dL


(8.5-10.1) 





 


Phosphorus Level


 


 


 4.2 mg/dL


(2.6-4.7) 





 


Magnesium Level


 


 


 2.1 mg/dL


(1.8-2.4) 





 


Total Bilirubin


 


 


 0.4 mg/dL


(0.2-1.0) 





 


Aspartate Amino Transf


(AST/SGOT) 


 


 23 U/L (15-37) 


 





 


Alanine Aminotransferase


(ALT/SGPT) 


 


 20 U/L (14-59) 


 





 


Alkaline Phosphatase


 


 


 255 U/L


() 





 


Total Protein


 


 


 4.8 g/dL


(6.4-8.2) 





 


Albumin


 


 


 1.1 g/dL


(3.4-5.0) 





 


Albumin/Globulin Ratio   0.3 (1.0-1.7)  








Problem List


Problems


Medical Problems:


(1) Acute pancreatitis


Status: Acute  





(2) Cholelithiasis


Status: Acute  








Assessment/Plan


plan xlap next week





Justicifation of Admission Dx:


Justifications for Admission:


Justification of Admission Dx:  Yes











GAMAL ZHOU MD             Jun 24, 2020 12:59

## 2020-06-24 NOTE — PDOC
Infectious Disease Note


Subjective


Subjective


Patient is awake says she is feeling better





ROS


ROS


no n/v/d/





Vital Sign


Vital Signs





Vital Signs








  Date Time  Temp Pulse Resp B/P (MAP) Pulse Ox O2 Delivery O2 Flow Rate FiO2


 


6/24/20 07:25     98 Tracheal Collar 8.0 


 


6/24/20 07:00  122 21 97/82 (87)    


 


6/24/20 04:00 99.9       





 99.9       











Physical Exam


PHYSICAL EXAM


GENERAL: Propped up in bed, resting quietly 


HEENT: NGT in place. 


NECK:  Tracheostomy 


LUNGS: Diminished aeration bases, no accessory muscle use - CT on left with 

clear fluid


HEART:  S1, S2,regular 


ABDOMEN: Mild distention, bowel sounds present, soft, grimaces to palpation, 

drains x 3


: [place] ( 6/7)


EXTREMITIES: + edema BLE


SKIN: no signs of gen rash 


LUE-PICC (5/29) without signs of complications





Labs


Lab





Laboratory Tests








Test


 6/23/20


12:22 6/23/20


17:01 6/24/20


00:36 6/24/20


06:15


 


Glucose (Fingerstick)


 156 mg/dL


(70-99) 157 mg/dL


(70-99) 141 mg/dL


(70-99) 





 


White Blood Count


 


 


 


 13.2 x10^3/uL


(4.0-11.0)


 


Red Blood Count


 


 


 


 3.41 x10^6/uL


(3.50-5.40)


 


Hemoglobin


 


 


 


 9.4 g/dL


(12.0-15.5)


 


Hematocrit


 


 


 


 29.0 %


(36.0-47.0)


 


Mean Corpuscular Volume    85 fL () 


 


Mean Corpuscular Hemoglobin    28 pg (25-35) 


 


Mean Corpuscular Hemoglobin


Concent 


 


 


 33 g/dL


(31-37)


 


Red Cell Distribution Width


 


 


 


 17.5 %


(11.5-14.5)


 


Platelet Count


 


 


 


 449 x10^3/uL


(140-400)


 


Neutrophils (%) (Auto)    81 % (31-73) 


 


Lymphocytes (%) (Auto)    11 % (24-48) 


 


Monocytes (%) (Auto)    6 % (0-9) 


 


Eosinophils (%) (Auto)    1 % (0-3) 


 


Basophils (%) (Auto)    0 % (0-3) 


 


Neutrophils # (Auto)


 


 


 


 10.8 x10^3/uL


(1.8-7.7)


 


Lymphocytes # (Auto)


 


 


 


 1.5 x10^3/uL


(1.0-4.8)


 


Monocytes # (Auto)


 


 


 


 0.8 x10^3/uL


(0.0-1.1)


 


Eosinophils # (Auto)


 


 


 


 0.1 x10^3/uL


(0.0-0.7)


 


Basophils # (Auto)


 


 


 


 0.0 x10^3/uL


(0.0-0.2)


 


Prothrombin Time


 


 


 


 14.0 SEC


(11.7-14.0)


 


Prothromb Time International


Ratio 


 


 


 1.1 (0.8-1.1) 





 


Sodium Level


 


 


 


 135 mmol/L


(136-145)


 


Potassium Level


 


 


 


 4.3 mmol/L


(3.5-5.1)


 


Chloride Level


 


 


 


 99 mmol/L


()


 


Carbon Dioxide Level


 


 


 


 34 mmol/L


(21-32)


 


Anion Gap    2 (6-14) 


 


Blood Urea Nitrogen


 


 


 


 13 mg/dL


(7-20)


 


Creatinine


 


 


 


 0.7 mg/dL


(0.6-1.0)


 


Estimated GFR


(Cockcroft-Gault) 


 


 


 88.9 





 


BUN/Creatinine Ratio    19 (6-20) 


 


Glucose Level


 


 


 


 130 mg/dL


(70-99)


 


Calcium Level


 


 


 


 10.4 mg/dL


(8.5-10.1)


 


Phosphorus Level


 


 


 


 4.2 mg/dL


(2.6-4.7)


 


Magnesium Level


 


 


 


 2.1 mg/dL


(1.8-2.4)


 


Total Bilirubin


 


 


 


 0.4 mg/dL


(0.2-1.0)


 


Aspartate Amino Transf


(AST/SGOT) 


 


 


 23 U/L (15-37) 





 


Alanine Aminotransferase


(ALT/SGPT) 


 


 


 20 U/L (14-59) 





 


Alkaline Phosphatase


 


 


 


 255 U/L


()


 


Total Protein


 


 


 


 4.8 g/dL


(6.4-8.2)


 


Albumin


 


 


 


 1.1 g/dL


(3.4-5.0)


 


Albumin/Globulin Ratio    0.3 (1.0-1.7) 


 


Test


 6/24/20


06:20 


 


 





 


Glucose (Fingerstick)


 133 mg/dL


(70-99) 


 


 











Micro








Objective


Assessment


Fever intermittent could be from underlying pancreatitis vs aspiration 


? Ileus with vomiting


Abd distention - U/S and CT reviewed s/p 0.4 L of opaque, debris-containing 

ascites was removed 5/6


Acute pancreatitis with persistent necrosis


  - 4/27 status post ROBERT drain placement + C paropsilosis; 5/6 + yeast & high 

amylase; s/p additional drains on 5/8


Anemia - S/p PRBCs


Cholelithiasis with thickening of the gallbladder wall.


Leucocytosis improved 


JED, hyperkalemia, Metabolic acidosis off dialysis


Acute hypoxic resp failure ,bilateral pleural effusion and atelectasis


hypocalcemia 


Prediabetes


HTN


s/p trach





Plan


Plan of Care


Continue cipro 6/18. sputum from 6/13 - growing PSA - may be colonization (I to 

Meropenem), clinically stable from resp standpoint


Continue meropenem, has MDRO PSAE June 7 from abd


Continue micafungin June 7


Follow-up cultures fluid for yeast ID


Monitor labs/temp


General surgery following


Monitor drain output


Maintain aspiration precaution


Supportive care


Contact islation for CRE/MDRO











LINN FRANZ MD               Jun 24, 2020 07:55

## 2020-06-24 NOTE — NUR
Last night patient had and episode of vomiting and sao2 <87%. Able to suction. complaining 
of nausea. Compazine given. low grade fever. Before shift change patient patient had another 
episode of low sa02 <86%. suction x1. Patient refused further suctioning. Will continue to 
monitor.

## 2020-06-24 NOTE — PDOC
PULMONARY PROGRESS NOTES


Subjective


off vent ,ct drainage 200 cc/24 hrs 


Patient intubated on 3/23 , s/p trach 4/6, 


transferred back to ICU 6/5 for syncope with collapse


developed anemia, blood drainage from RLQ abdomen drain site, and surrounding 

firmness  / developed septic shock 6/7 from abdomen source, required levo 6/7


s/p 3 new drains 6/7 with brown color drainage


s/p left chest tube, draining


Vitals





Vital Signs








  Date Time  Temp Pulse Resp B/P (MAP) Pulse Ox O2 Delivery O2 Flow Rate FiO2


 


6/24/20 10:00  120 21 123/72 (89) 98 Tracheal Collar  


 


6/24/20 09:00 99.3       





 99.3       


 


6/24/20 07:25       8.0 








Comments


awake,no soa


General:  Alert, No acute distress


HEENT:  Other (nc at perrl   nose clear    neck  trach site ok   no lab  no 

thyromegaly)


Lungs:  Other (diminshed in bases, Rhonci in LLL)


Cardiovascular:  S1, S2


Abdomen:  Soft, Non-tender, Other (bleeding from Sx. site RLQ and firmness)


Extremities:  Other (+1 BLE edema)


Skin:  Warm, Dry


Labs





Laboratory Tests








Test


 6/22/20


11:17 6/22/20


17:15 6/23/20


06:28 6/23/20


12:22


 


Glucose (Fingerstick)


 183 mg/dL


(70-99) 149 mg/dL


(70-99) 101 mg/dL


(70-99) 156 mg/dL


(70-99)


 


Sodium Level


 


 


 136 mmol/L


(136-145) 





 


Potassium Level


 


 


 4.3 mmol/L


(3.5-5.1) 





 


Chloride Level


 


 


 99 mmol/L


() 





 


Carbon Dioxide Level


 


 


 33 mmol/L


(21-32) 





 


Anion Gap   4 (6-14)  


 


Blood Urea Nitrogen


 


 


 15 mg/dL


(7-20) 





 


Creatinine


 


 


 0.7 mg/dL


(0.6-1.0) 





 


Estimated GFR


(Cockcroft-Gault) 


 


 88.9 


 





 


Glucose Level


 


 


 109 mg/dL


(70-99) 





 


Calcium Level


 


 


 10.1 mg/dL


(8.5-10.1) 





 


Triglycerides Level


 


 


 174 mg/dL


(0-150) 





 


Test


 6/23/20


17:01 6/24/20


00:36 6/24/20


06:15 6/24/20


06:20


 


Glucose (Fingerstick)


 157 mg/dL


(70-99) 141 mg/dL


(70-99) 


 133 mg/dL


(70-99)


 


White Blood Count


 


 


 13.2 x10^3/uL


(4.0-11.0) 





 


Red Blood Count


 


 


 3.41 x10^6/uL


(3.50-5.40) 





 


Hemoglobin


 


 


 9.4 g/dL


(12.0-15.5) 





 


Hematocrit


 


 


 29.0 %


(36.0-47.0) 





 


Mean Corpuscular Volume   85 fL ()  


 


Mean Corpuscular Hemoglobin   28 pg (25-35)  


 


Mean Corpuscular Hemoglobin


Concent 


 


 33 g/dL


(31-37) 





 


Red Cell Distribution Width


 


 


 17.5 %


(11.5-14.5) 





 


Platelet Count


 


 


 449 x10^3/uL


(140-400) 





 


Neutrophils (%) (Auto)   81 % (31-73)  


 


Lymphocytes (%) (Auto)   11 % (24-48)  


 


Monocytes (%) (Auto)   6 % (0-9)  


 


Eosinophils (%) (Auto)   1 % (0-3)  


 


Basophils (%) (Auto)   0 % (0-3)  


 


Neutrophils # (Auto)


 


 


 10.8 x10^3/uL


(1.8-7.7) 





 


Lymphocytes # (Auto)


 


 


 1.5 x10^3/uL


(1.0-4.8) 





 


Monocytes # (Auto)


 


 


 0.8 x10^3/uL


(0.0-1.1) 





 


Eosinophils # (Auto)


 


 


 0.1 x10^3/uL


(0.0-0.7) 





 


Basophils # (Auto)


 


 


 0.0 x10^3/uL


(0.0-0.2) 





 


Prothrombin Time


 


 


 14.0 SEC


(11.7-14.0) 





 


Prothromb Time International


Ratio 


 


 1.1 (0.8-1.1) 


 





 


Sodium Level


 


 


 135 mmol/L


(136-145) 





 


Potassium Level


 


 


 4.3 mmol/L


(3.5-5.1) 





 


Chloride Level


 


 


 99 mmol/L


() 





 


Carbon Dioxide Level


 


 


 34 mmol/L


(21-32) 





 


Anion Gap   2 (6-14)  


 


Blood Urea Nitrogen


 


 


 13 mg/dL


(7-20) 





 


Creatinine


 


 


 0.7 mg/dL


(0.6-1.0) 





 


Estimated GFR


(Cockcroft-Gault) 


 


 88.9 


 





 


BUN/Creatinine Ratio   19 (6-20)  


 


Glucose Level


 


 


 130 mg/dL


(70-99) 





 


Calcium Level


 


 


 10.4 mg/dL


(8.5-10.1) 





 


Phosphorus Level


 


 


 4.2 mg/dL


(2.6-4.7) 





 


Magnesium Level


 


 


 2.1 mg/dL


(1.8-2.4) 





 


Total Bilirubin


 


 


 0.4 mg/dL


(0.2-1.0) 





 


Aspartate Amino Transf


(AST/SGOT) 


 


 23 U/L (15-37) 


 





 


Alanine Aminotransferase


(ALT/SGPT) 


 


 20 U/L (14-59) 


 





 


Alkaline Phosphatase


 


 


 255 U/L


() 





 


Total Protein


 


 


 4.8 g/dL


(6.4-8.2) 





 


Albumin


 


 


 1.1 g/dL


(3.4-5.0) 





 


Albumin/Globulin Ratio   0.3 (1.0-1.7)  








Laboratory Tests








Test


 6/23/20


12:22 6/23/20


17:01 6/24/20


00:36 6/24/20


06:15


 


Glucose (Fingerstick)


 156 mg/dL


(70-99) 157 mg/dL


(70-99) 141 mg/dL


(70-99) 





 


White Blood Count


 


 


 


 13.2 x10^3/uL


(4.0-11.0)


 


Red Blood Count


 


 


 


 3.41 x10^6/uL


(3.50-5.40)


 


Hemoglobin


 


 


 


 9.4 g/dL


(12.0-15.5)


 


Hematocrit


 


 


 


 29.0 %


(36.0-47.0)


 


Mean Corpuscular Volume    85 fL () 


 


Mean Corpuscular Hemoglobin    28 pg (25-35) 


 


Mean Corpuscular Hemoglobin


Concent 


 


 


 33 g/dL


(31-37)


 


Red Cell Distribution Width


 


 


 


 17.5 %


(11.5-14.5)


 


Platelet Count


 


 


 


 449 x10^3/uL


(140-400)


 


Neutrophils (%) (Auto)    81 % (31-73) 


 


Lymphocytes (%) (Auto)    11 % (24-48) 


 


Monocytes (%) (Auto)    6 % (0-9) 


 


Eosinophils (%) (Auto)    1 % (0-3) 


 


Basophils (%) (Auto)    0 % (0-3) 


 


Neutrophils # (Auto)


 


 


 


 10.8 x10^3/uL


(1.8-7.7)


 


Lymphocytes # (Auto)


 


 


 


 1.5 x10^3/uL


(1.0-4.8)


 


Monocytes # (Auto)


 


 


 


 0.8 x10^3/uL


(0.0-1.1)


 


Eosinophils # (Auto)


 


 


 


 0.1 x10^3/uL


(0.0-0.7)


 


Basophils # (Auto)


 


 


 


 0.0 x10^3/uL


(0.0-0.2)


 


Prothrombin Time


 


 


 


 14.0 SEC


(11.7-14.0)


 


Prothromb Time International


Ratio 


 


 


 1.1 (0.8-1.1) 





 


Sodium Level


 


 


 


 135 mmol/L


(136-145)


 


Potassium Level


 


 


 


 4.3 mmol/L


(3.5-5.1)


 


Chloride Level


 


 


 


 99 mmol/L


()


 


Carbon Dioxide Level


 


 


 


 34 mmol/L


(21-32)


 


Anion Gap    2 (6-14) 


 


Blood Urea Nitrogen


 


 


 


 13 mg/dL


(7-20)


 


Creatinine


 


 


 


 0.7 mg/dL


(0.6-1.0)


 


Estimated GFR


(Cockcroft-Gault) 


 


 


 88.9 





 


BUN/Creatinine Ratio    19 (6-20) 


 


Glucose Level


 


 


 


 130 mg/dL


(70-99)


 


Calcium Level


 


 


 


 10.4 mg/dL


(8.5-10.1)


 


Phosphorus Level


 


 


 


 4.2 mg/dL


(2.6-4.7)


 


Magnesium Level


 


 


 


 2.1 mg/dL


(1.8-2.4)


 


Total Bilirubin


 


 


 


 0.4 mg/dL


(0.2-1.0)


 


Aspartate Amino Transf


(AST/SGOT) 


 


 


 23 U/L (15-37) 





 


Alanine Aminotransferase


(ALT/SGPT) 


 


 


 20 U/L (14-59) 





 


Alkaline Phosphatase


 


 


 


 255 U/L


()


 


Total Protein


 


 


 


 4.8 g/dL


(6.4-8.2)


 


Albumin


 


 


 


 1.1 g/dL


(3.4-5.0)


 


Albumin/Globulin Ratio    0.3 (1.0-1.7) 


 


Test


 6/24/20


06:20 


 


 





 


Glucose (Fingerstick)


 133 mg/dL


(70-99) 


 


 











Medications





Active Scripts








 Medications  Dose


 Route/Sig


 Max Daily Dose Days Date Category


 


 Bisoprolol


 Fumarate 5 Mg


 Tablet  10 Mg


 PO DAILY


   3/16/20 Reported








Comments


CXR 6/22


poor insp effort/ increase effusion


 











ct abdomen /pelvis 6/6


1. Removal of the percutaneous pigtail drainage catheters since the prior 


exam. Sequela of pancreatitis with extensive pseudocysts again 


demonstrated, the right-sided collections are slightly larger since the 


prior exam, the left-sided collections are stable. See above.


2. Moderate to large left pleural effusion with atelectasis and collapse 


of most of the left lower lobe, stable. Small right pleural effusion is 


stable.


3. Gallstone.





ct chest 6/15 reviewed








 GRAM NEG COCCOBACILLI:MANY


        SQUAMOUS EPI CELL:RARE


        PMN (WBCs):FEW


        Unless otherwise specified, Testing Performed by:


        [address] [id number]


        For Inquires, the Physician may contact the Microbiology


        department at [phone]





  RESPIRATORY CULTURE  Final  


        Final





        MANY GRAM NEGATIVE RODS on 06/15/20 at 1107


        FINAL ID= [PSEUDOMONAS AERUGINOSA]


        MICRO CHARGES


        PSEUDOMONAS AERUGINOSA





  ANTIMICROBIAL SUSCEPTIBILITY  Final  


        Comment





        NEG ANSON 56


        PSEUDOMONAS AERUGINOSA


        ANTIBIOTIC                        RESULT          INTERPRETATION


        AMIKACIN                          <=16                  S


        AZTREONAM                         <=4                   S


        CEFTAZIDIME                       <=1                   S


        CIPROFLOXACIN                     <=0.25                S


        CEFEPIME                          <=2                   S


        CEFTAZIDIME/AVIBACTAM             <=4                   S


        GENTAMICIN                        <=2                   S


        LEVOFLOXACIN                      <=0.5                 S





Impression


.


IMPRESSION:


1.  Acute hypoxemic respiratory failure secondary to ARDS status post trach, 

developed anemia 6/7, blood drainage from RLQ abdomen drain site, and surround

ing firmness  / developed septic shock 6/7 from abdomen source, required levo 

6/7


s/p 3 new drains 6/7 with brown color drainage,  on/off vent , on TS now


2.  Gallstone pancreatitis, now with ongoing bleeding from prior drain. Anemic. 

s/p Tx multiple units over several days


3.  septic shock/sepsis, recurrent 6/7, source abdomen. , off levo now, 


4.  Acute kidney injury-, Off HD--renal function decling. suspect JED on CKD due

to hypotension , improved now


5.  Acute gallstone pancreatitis.


6.  Hypoalbuminemia.


7.  Moderate persistent effusions, s/p left thora  5/12, reaccumulation of left 

effusion. O2 requirement not changed. 


8.  Fever- ,hypotension. suspect recurrent sepsis/ likely pancreatic source.  

Per ID, per surgery--


9.  Chronic anemia-- ongoing / s/p PRBC


10. Covid 19 testing negative


11. Moderate to large ascites-S/P paracentisis


12.S/P paracentisis with 4 liters removed on 4/15/20


13. S/P IR drain placement on 5/8/2020, removal, re inserted 6/7


14. Depression/Anxiety 


15. Increase effusion, ? loculated/ s/p chest tube.. drainage slowing down. 200 

cc /24 hr





Plan


.


1. TS as tolerated  titrate fio2 to keep sat 94%, off  vent 


2. Follow all cultures/ PSA from pelvic drains. Abx per ID/  thoracentesis 

result transudate, await c/s,


3. Follow surgery recs-- Acute pancreatitis with persistent necrosis ---- 4/27 

status post ROBERT drain placement + C paropsilosis; 5/6 + yeast & high amylase; s/p

additional drains on 5/8, removal of drains and now increase pseudocyst and 

increase fluid collection. likely infected fluid/ sepsis. on BS abx.follow 

surgery rec, planning surgical intervention next few weeks


4. Follow ID recs for ABX----


5. Follow nephrology recs -----


6. Continue TPN 


7. monitor HGB,  4 units since 6/6--monitor HGB transfuse if less than 8.5 


8 s/p  thoracentesis, 5/12, 3 litres removed, s/p ct on 6/15. ct drainage, 180 

cc over the past 24 hrs,  suction -40, am cxr, flushed chest tube 6/22


9. Pt remains critically ill from severe necrotic pancreatis. Even with surgery 

prognosis grim. Surgery informed family.


10. lasix/albumin prn


11.  sputum gram neg cocobacilli. pan sensitive





     


DVT/GI PPX: protonix---


PT/OT


D/W RN, RT











SHARYN SOLORZANO MD                 Jun 24, 2020 10:18

## 2020-06-24 NOTE — NUR
Pharmacy TPN Dosing Note



S: JESENIA BEAN is a 49 year old F Currently receiving Central Continuous TPN started 
03/18/20



B:Pertinent PMH: Necrotizing pancreatitis

Height: 5 feet, 8 inches

Weight: 87.5 kg



Current diet: NPO 



LABS:

Sodium:    135 

Potassium: 4.3 

Chloride:  99 

Calcium:   10.4 

Corrected Calcium: 12.72 

Magnesium: 2.1 

CO2:       34 

SCr:       0.7 

Glucose:   130-157 

Albumin:   1.1 

AST:       23 

ALT:       20 



TPN FORMULA:

TPN TYPE:  Central Continuous

AMINO ACIDS:         80 gm

DEXTROSE:            250 gm

LIPIDS:              20 gm



SODIUM CHLORIDE:     80 mEq

POTASSIUM CHLORIDE:  30 mEq

POTASSIUM ACETATE:   30 mEq

MAGNESIUM:           14 mEq

INSULIN:             15 units

MULTIPLE VITAMIN:    10 ml

TRACE ELEMENTS:      1 ml(s)



TPN PLAN:  

Increase sodium in TPN to 80mEq





R: Change TPN per plan and ordered formula

Will monitor electrolytes, glucose, and tolerance to TPN.



 Maribell Vazquez RUTHANN, 06/24/20 [id number]

## 2020-06-24 NOTE — PDOC
G I PROGRESS NOTE


Subjective


Asleep, not awakened.


Objective


Staff report no major changes.  Occasional emesis.  Not much out NG.


Physical Exam


Trached on vent.


NG


Review of Relevant


I have reviewed the following items josy (where applicable) has been applied.


Labs





Laboratory Tests








Test


 6/22/20


11:17 6/22/20


17:15 6/23/20


06:28 6/23/20


12:22


 


Glucose (Fingerstick)


 183 mg/dL


(70-99) 149 mg/dL


(70-99) 101 mg/dL


(70-99) 156 mg/dL


(70-99)


 


Sodium Level


 


 


 136 mmol/L


(136-145) 





 


Potassium Level


 


 


 4.3 mmol/L


(3.5-5.1) 





 


Chloride Level


 


 


 99 mmol/L


() 





 


Carbon Dioxide Level


 


 


 33 mmol/L


(21-32) 





 


Anion Gap   4 (6-14)  


 


Blood Urea Nitrogen


 


 


 15 mg/dL


(7-20) 





 


Creatinine


 


 


 0.7 mg/dL


(0.6-1.0) 





 


Estimated GFR


(Cockcroft-Gault) 


 


 88.9 


 





 


Glucose Level


 


 


 109 mg/dL


(70-99) 





 


Calcium Level


 


 


 10.1 mg/dL


(8.5-10.1) 





 


Triglycerides Level


 


 


 174 mg/dL


(0-150) 





 


Test


 6/23/20


17:01 6/24/20


00:36 6/24/20


06:15 6/24/20


06:20


 


Glucose (Fingerstick)


 157 mg/dL


(70-99) 141 mg/dL


(70-99) 


 133 mg/dL


(70-99)


 


White Blood Count


 


 


 13.2 x10^3/uL


(4.0-11.0) 





 


Red Blood Count


 


 


 3.41 x10^6/uL


(3.50-5.40) 





 


Hemoglobin


 


 


 9.4 g/dL


(12.0-15.5) 





 


Hematocrit


 


 


 29.0 %


(36.0-47.0) 





 


Mean Corpuscular Volume   85 fL ()  


 


Mean Corpuscular Hemoglobin   28 pg (25-35)  


 


Mean Corpuscular Hemoglobin


Concent 


 


 33 g/dL


(31-37) 





 


Red Cell Distribution Width


 


 


 17.5 %


(11.5-14.5) 





 


Platelet Count


 


 


 449 x10^3/uL


(140-400) 





 


Neutrophils (%) (Auto)   81 % (31-73)  


 


Lymphocytes (%) (Auto)   11 % (24-48)  


 


Monocytes (%) (Auto)   6 % (0-9)  


 


Eosinophils (%) (Auto)   1 % (0-3)  


 


Basophils (%) (Auto)   0 % (0-3)  


 


Neutrophils # (Auto)


 


 


 10.8 x10^3/uL


(1.8-7.7) 





 


Lymphocytes # (Auto)


 


 


 1.5 x10^3/uL


(1.0-4.8) 





 


Monocytes # (Auto)


 


 


 0.8 x10^3/uL


(0.0-1.1) 





 


Eosinophils # (Auto)


 


 


 0.1 x10^3/uL


(0.0-0.7) 





 


Basophils # (Auto)


 


 


 0.0 x10^3/uL


(0.0-0.2) 





 


Prothrombin Time


 


 


 14.0 SEC


(11.7-14.0) 





 


Prothromb Time International


Ratio 


 


 1.1 (0.8-1.1) 


 





 


Sodium Level


 


 


 135 mmol/L


(136-145) 





 


Potassium Level


 


 


 4.3 mmol/L


(3.5-5.1) 





 


Chloride Level


 


 


 99 mmol/L


() 





 


Carbon Dioxide Level


 


 


 34 mmol/L


(21-32) 





 


Anion Gap   2 (6-14)  


 


Blood Urea Nitrogen


 


 


 13 mg/dL


(7-20) 





 


Creatinine


 


 


 0.7 mg/dL


(0.6-1.0) 





 


Estimated GFR


(Cockcroft-Gault) 


 


 88.9 


 





 


BUN/Creatinine Ratio   19 (6-20)  


 


Glucose Level


 


 


 130 mg/dL


(70-99) 





 


Calcium Level


 


 


 10.4 mg/dL


(8.5-10.1) 





 


Phosphorus Level


 


 


 4.2 mg/dL


(2.6-4.7) 





 


Magnesium Level


 


 


 2.1 mg/dL


(1.8-2.4) 





 


Total Bilirubin


 


 


 0.4 mg/dL


(0.2-1.0) 





 


Aspartate Amino Transf


(AST/SGOT) 


 


 23 U/L (15-37) 


 





 


Alanine Aminotransferase


(ALT/SGPT) 


 


 20 U/L (14-59) 


 





 


Alkaline Phosphatase


 


 


 255 U/L


() 





 


Total Protein


 


 


 4.8 g/dL


(6.4-8.2) 





 


Albumin


 


 


 1.1 g/dL


(3.4-5.0) 





 


Albumin/Globulin Ratio   0.3 (1.0-1.7)  








Laboratory Tests








Test


 6/23/20


12:22 6/23/20


17:01 6/24/20


00:36 6/24/20


06:15


 


Glucose (Fingerstick)


 156 mg/dL


(70-99) 157 mg/dL


(70-99) 141 mg/dL


(70-99) 





 


White Blood Count


 


 


 


 13.2 x10^3/uL


(4.0-11.0)


 


Red Blood Count


 


 


 


 3.41 x10^6/uL


(3.50-5.40)


 


Hemoglobin


 


 


 


 9.4 g/dL


(12.0-15.5)


 


Hematocrit


 


 


 


 29.0 %


(36.0-47.0)


 


Mean Corpuscular Volume    85 fL () 


 


Mean Corpuscular Hemoglobin    28 pg (25-35) 


 


Mean Corpuscular Hemoglobin


Concent 


 


 


 33 g/dL


(31-37)


 


Red Cell Distribution Width


 


 


 


 17.5 %


(11.5-14.5)


 


Platelet Count


 


 


 


 449 x10^3/uL


(140-400)


 


Neutrophils (%) (Auto)    81 % (31-73) 


 


Lymphocytes (%) (Auto)    11 % (24-48) 


 


Monocytes (%) (Auto)    6 % (0-9) 


 


Eosinophils (%) (Auto)    1 % (0-3) 


 


Basophils (%) (Auto)    0 % (0-3) 


 


Neutrophils # (Auto)


 


 


 


 10.8 x10^3/uL


(1.8-7.7)


 


Lymphocytes # (Auto)


 


 


 


 1.5 x10^3/uL


(1.0-4.8)


 


Monocytes # (Auto)


 


 


 


 0.8 x10^3/uL


(0.0-1.1)


 


Eosinophils # (Auto)


 


 


 


 0.1 x10^3/uL


(0.0-0.7)


 


Basophils # (Auto)


 


 


 


 0.0 x10^3/uL


(0.0-0.2)


 


Prothrombin Time


 


 


 


 14.0 SEC


(11.7-14.0)


 


Prothromb Time International


Ratio 


 


 


 1.1 (0.8-1.1) 





 


Sodium Level


 


 


 


 135 mmol/L


(136-145)


 


Potassium Level


 


 


 


 4.3 mmol/L


(3.5-5.1)


 


Chloride Level


 


 


 


 99 mmol/L


()


 


Carbon Dioxide Level


 


 


 


 34 mmol/L


(21-32)


 


Anion Gap    2 (6-14) 


 


Blood Urea Nitrogen


 


 


 


 13 mg/dL


(7-20)


 


Creatinine


 


 


 


 0.7 mg/dL


(0.6-1.0)


 


Estimated GFR


(Cockcroft-Gault) 


 


 


 88.9 





 


BUN/Creatinine Ratio    19 (6-20) 


 


Glucose Level


 


 


 


 130 mg/dL


(70-99)


 


Calcium Level


 


 


 


 10.4 mg/dL


(8.5-10.1)


 


Phosphorus Level


 


 


 


 4.2 mg/dL


(2.6-4.7)


 


Magnesium Level


 


 


 


 2.1 mg/dL


(1.8-2.4)


 


Total Bilirubin


 


 


 


 0.4 mg/dL


(0.2-1.0)


 


Aspartate Amino Transf


(AST/SGOT) 


 


 


 23 U/L (15-37) 





 


Alanine Aminotransferase


(ALT/SGPT) 


 


 


 20 U/L (14-59) 





 


Alkaline Phosphatase


 


 


 


 255 U/L


()


 


Total Protein


 


 


 


 4.8 g/dL


(6.4-8.2)


 


Albumin


 


 


 


 1.1 g/dL


(3.4-5.0)


 


Albumin/Globulin Ratio    0.3 (1.0-1.7) 


 


Test


 6/24/20


06:20 


 


 





 


Glucose (Fingerstick)


 133 mg/dL


(70-99) 


 


 











Microbiology


6/18/20 Blood Culture - Final, Complete


          NO GROWTH AFTER 5 DAYS


6/15/20 Gram Stain - Final, Complete


          


6/15/20 Aerobic and Anaerobic Culture - Final, Complete


          


6/13/20 Gram Stain Evaluation - Final, Complete


          


6/13/20 Respiratory Culture - Final, Complete


          


6/13/20 Antimicrobic Susceptibility - Final, Complete


          


6/7/20 Urine Culture - Final, Complete


         


5/30/20 Gram Stain - Final, Complete


          


5/30/20 Aerobic Culture - Final, Complete


Vitals/I & O





Vital Sign - Last 24 Hours








 6/23/20 6/23/20 6/23/20 6/23/20





 11:00 12:00 12:00 12:27


 


Temp  98.9  





  98.9  


 


Pulse 116 110  


 


Resp 22 22  


 


B/P (MAP) 113/80 (91)   


 


Pulse Ox 98 98  


 


O2 Delivery Tracheal Collar Tracheal Collar Trach Collar Tracheal Collar


 


O2 Flow Rate    8.0


 


    





    





 6/23/20 6/23/20 6/23/20 6/23/20





 13:00 14:00 15:00 15:26


 


Pulse 124 118 116 


 


Resp 22 20 26 


 


B/P (MAP) 140/90 (107) 110/64 (79) 109/61 (77) 


 


Pulse Ox 98 98 98 


 


O2 Delivery Tracheal Collar Tracheal Collar Tracheal Collar Tracheal Collar


 


O2 Flow Rate    8.0





 6/23/20 6/23/20 6/23/20 6/23/20





 16:00 16:00 17:00 18:00


 


Temp  99.1  





  99.1  


 


Pulse  105 111 110


 


Resp  23 22 18


 


B/P (MAP)  105/66 (79) 113/71 (85) 114/67 (83)


 


Pulse Ox  97 98 98


 


O2 Delivery Trach Collar Tracheal Collar Tracheal Collar Tracheal Collar


 


    





    





 6/23/20 6/23/20 6/23/20 6/23/20





 19:00 19:56 20:00 20:00


 


Temp    97.2





    97.2


 


Pulse 111   110


 


Resp 28   20


 


B/P (MAP) 113/72 (86)   125/81 (96)


 


Pulse Ox 94 93  96


 


O2 Delivery Tracheal Collar Tracheal Collar Trach Collar Tracheal Collar


 


O2 Flow Rate  8.0  


 


    





    





 6/23/20 6/23/20 6/23/20 6/23/20





 21:00 22:00 23:00 23:29


 


Pulse 105 108 106 


 


Resp 20 30 19 20


 


B/P (MAP) 116/72 (87) 94/56 (69) 114/67 (83) 


 


Pulse Ox 99 88 100 93


 


O2 Delivery Tracheal Collar Tracheal Collar Tracheal Collar 


 


O2 Flow Rate    8.0





 6/23/20 6/23/20 6/24/20 6/24/20





 23:30 23:59 00:00 00:01


 


Temp    97.7





    97.7


 


Pulse    103


 


Resp  20  19


 


B/P (MAP)    94/58 (70)


 


Pulse Ox 99 99  100


 


O2 Delivery Tracheal Collar Tracheal Collar Trach Collar Tracheal Collar


 


O2 Flow Rate 8.0 8.0  


 


    





    





 6/24/20 6/24/20 6/24/20 6/24/20





 01:00 02:00 03:00 04:00


 


Pulse 107 124 119 


 


Resp 21 27 24 


 


B/P (MAP) 102/69 (80) 158/95 (116) 105/75 (85) 


 


Pulse Ox 98 98 98 99


 


O2 Delivery Tracheal Collar Tracheal Collar Tracheal Collar Tracheal Collar


 


O2 Flow Rate    8.0





 6/24/20 6/24/20 6/24/20 6/24/20





 04:00 04:00 05:00 06:00


 


Temp 99.9   





 99.9   


 


Pulse 114  124 122


 


Resp 24  29 29


 


B/P (MAP) 103/71 (82)  106/64 (78) 109/63 (78)


 


Pulse Ox 100  87 87


 


O2 Delivery Tracheal Collar Trach Collar Tracheal Collar Tracheal Collar


 


    





    





 6/24/20 6/24/20 6/24/20 6/24/20





 07:00 07:25 08:00 08:00


 


Pulse 122  125 


 


Resp 21  22 


 


B/P (MAP) 97/82 (87)  113/76 (88) 


 


Pulse Ox 99 98 98 


 


O2 Delivery Tracheal Collar Tracheal Collar Tracheal Collar Trach Collar


 


O2 Flow Rate  8.0  





 6/24/20 6/24/20  





 09:00 10:00  


 


Temp 99.3   





 99.3   


 


Pulse 123 120  


 


Resp 22 21  


 


B/P (MAP) 117/57 (77) 123/72 (89)  


 


Pulse Ox 98 98  


 


O2 Delivery Tracheal Collar Tracheal Collar  














Intake and Output   


 


 6/23/20 6/23/20 6/24/20





 15:00 23:00 07:00


 


Intake Total  1503 ml 228.4 ml


 


Output Total 510 ml 735 ml 960 ml


 


Balance -510 ml 768 ml -731.6 ml








Problem List


Problems


Medical Problems:


(1) Acute pancreatitis


Status: Acute  





(2) Cholelithiasis


Status: Acute  





Assessment


Biliary pancreatitis/pseudocysts.  Stable on support.


Plan of Care Note


CPM


Await upcoming surgery.





Justicifation of Admission Dx:


Justifications for Admission:


Justification of Admission Dx:  Yes











MARCO ANTONIO LONGO MD          Jun 24, 2020 10:41

## 2020-06-24 NOTE — PDOC
PROGRESS NOTES


Chief Complaint


Chief Complaint


impression =================











History of Present Illness


50yo F w/ PMHx HTN, prediabetes who presented the emergency room complaints of 

abdominal pain. Patient described off and on 3 days. She states is constant, 

described as a squeezing sensation in a band-like distribution. + nausea, 

vomiting.  She denies any fever or diarrhea.  Patient denies any abdominal 

surgical procedures.  She stateed is worse with movements, car ride.  Pain 

initially was upper abdomen however now pretty much generalized.  Last bowel 

movement was 3/15/2020. Nothing makes her pain better.  Patient denies any 

shortness of breath.  She does state the pain moves into her chest.  Denies any 

headache or visual changes.


Lipase 32933, , , Bilirubin 1.4.


CT abdomen confirms pancreatic inflammation, peripancreatic fluid and 

inflammatory changes around the pancreas consistent with pancreatitis. 

Cholelithiasis and 1.4cm uterine fibroid as well as possible left salpingitis. 

Admitted for further care


Gallstone pancreatitis with necrosis. 


   -CT A/P 6/6 showed multiple pseudocysts, slight larger on the right. s/p 

drains x 3, 6/7.  + PSAE (MDRO-R Cefepime, Zosyn ANSON < 64) and yeast, 


   -s/p drain 4/27. C. parapsilosis. s/p drain 5/6 + yeast & high amylase; s/p 

additional drain on 5/8. Drains removed. 


Ascites s/p paracentesis 4/15 & 5/6. C. parapsilosis 


JED. off HD. 





impression ============================











Acute hypoxic Respiratory failure required  mechanical ventilation


Tracheostomy


bilateral pleural effusions/pulm edema s/p Throacentesis on 6/15/2020


Severe Acute gallstone pancreatitis (not a surgical candidate at this time) with

necrosis


Acute kidney failure now requiring dialysis


Gallstones (Calculus of gallbladder with acute cholecystitis without 

obstruction)


HTN 


Intractable pain


Intractable nausea


Covid 19 negative. 


Acute on chronic anemia 


EEG: No seizure activityFever  - better currently - intermittent could be from 

underlying pancreatitis blood cults 5/4 - neg so far


? Ileus with vomiting


Abd distention - U/S and CT reviewed s/p 0.4 L of opaque, debris-containing 

ascites was removed 5/6


Acute pancreatitis with persistent necrosis


Gallstone pancreatitis with necrosis. 


   -CT A/P 6/6 showed multiple pseudocysts, slight larger on the right. s/p 

drains x 3, 6/7.  + PSAE (MDRO-R Cefepime, Zosyn ANSON < 64) and yeast, 


   -s/p drain 4/27. C. parapsilosis. s/p drain 5/6 + yeast & high amylase; s/p 

additional drain on 5/8. Drains removed. 


Ascites s/p paracentesis 4/15 & 5/6. C. parapsilosis 


JED. off HD. 


A large fluid collection in the pancreatic bed has slightly decreased in size, 

described below, the pancreas itself is difficult to  visualize, which could be 

due to necrosis or obscuration of pancreatic  parenchyma from the surrounding 

fluid collection.6/15 


- 4/27 status post ROBERT drain placement + C paropsilosis. s/p additional drains 

5/8


Anemia - S/p PRBCs


Cholelithiasis with thickening of the gallbladder wall.


Leucocytosis improving


JED, hyperkalemia, Metabolic acidosis off dialysis


hypocalcemia 


Prediabetes


HTN


s/p trach


ESRD on HD


Hyperglycemia


severe protein-caloric malnutrition


Moderate to large left pleural effusion with atelectasis and collapse  of most 

of the left lower lobe, stable








6/18 FEVER 101, BLOOD CULTURE X 2 


6/19  iv cipro added


6/20  vent fio2 35% 





6/24 tentative surgery next week 











FEN - 





PPX - SCDs, off lovenox currently, high risk for thrombosis but in light of her 

recent anemia and need for transfusion the risks do not outweigh benefits at 

this time. will continue to evaluate on a daily basis


FULL CODE


Dispo - ICU, critically ill


BACK ON VENT 6/17  vent // no distress


iv albumin 6/19


Continue meropenem, has MDRO PSAE June 7 


Continue micafungin June 7 6/24 surgery plans with Dr Flores--tentatively next week














38 MIN CC TIME





History of Present Illness


History of Present Illness


6/8: IR placed drain on 6/7. 4u PRBC after Hb drop. Hb 8.8 today. Off Levophed 

this morning. T-max 100.3. Much more lethargic today. CXR with left sided 

diffuse infiltrates.


6/9: Tachycardic overnight into the 140s. NGT clamped. On BIPAP currently. 

Drains with serosanguinous discharge. WBC 8, Tmax 99.6F.


6/10: Seen on trach shield in ICU.  Hypertensive and tachycardic. Labs stable. 

blood stained drainage from drains. Afebrile.


6/11: Seen on trach shield in ICU. She is a bit confused, drowsy, but when 

sitting up is conversational and confusion somewhat clears. She is asking for 

more pain medication. Stable drains, still very tachy.Na 147


6/12: Patient vomited overnight. Aspirated. Tried to pull her trach out, she was

told she would die without her trach, she said "I know, I just want to go home".

Hb 7.6. Afebrile, still very tachycardic. 1055ml out of right sided ROBERT drain


6/13: Overnight hypoxic, on BIPAP. CXR with left sided white out lung. Signifi

cant mucous plug suctioned by RT with improvement in her ABG after 2 hours this 

morning. Not really active, tired, lethargic. 890ml out of drains past 24 hours.

On vent. D/w daughter bedside.


6/14: Still on vent overnight with copious pulmonary drainage on suctioning. 

Labs stable, UOP stable 1L. Drain output still significant with 1505mL. She has 

shown her phone password [id number] and made it clear she does not want her daughters 

to access her phone at this time.


6:15: Patient quite frail, she has had such a lengthy hospital stay that she is 

certainly depressed and as documented 3 days ago is wanting to go home. Most 

likely patient is also tired of being hospitalized with an end point no where in

sight. Reassurance and encouragement provided during my visit. Plans for 

thoracentesis later in the day 


6/16: No acute events reported overnight, case discussed with nursing staff 

patient in no acute distress, complaints of the abdomimal pain during my visit, 

patient is quite depressed, reassurance has been provided, discussed with 

nursing staff at bedside, replace electrolytes 


6/17 off vent / on TS , no distress








6/23 /2020


Patient seen and examined ICU BED


critically ill 


guardedlong-term prognosis 


Sputum from 6/13 - growing PSA - may be colonization I to Meropenem add Cipro


Continue meropenem, has MDRP PSAE June 7 from abd


cont micafungin June 7


bleeding from Sx. site RLQ and firmness)


max 1003 


oncology consulted


COMMENTS: Has specimen been collected/obtained? Y                     


  


      











36 MIN CC TIME








Date:  Apr 27, 2020





Pre-Op Diagnosis:


Necrotizing pancreatitis





Post-Op Diagnosis:


same





Procedure Performed:


laparoscopic exploration





Surgeon:


Frandy Flores


Asst:  Dr. Luis Santos





Anesthesia Type:


GETA plus local





Blood Loss:


50





Specimans Obtained:


cultures, debris





Findings:


1000 cc ascites suctioned off, cultures sent, diffuse debris, obliteration of 

surgical planes preventing any meaningful exploration




















 


 








6/2/2020


Patient seen and examined in the ICU


She is a little more alert today but still quite ill


Appears clammy and pale and depressed/anxious


Discussed with RN


Chart reviewed











6/1/2020


Patient seen and examined in the ICU


She appears extremely ill


She is tachypneic at 35 respirations per minute and tachycardic at 132 bpm


She is extremely encephalopathic and shaky


She appears clammy


Chart reviewed


Discussed with RN


Prognosis extremely guarded at best








5/31/2020


Patient still in ICU


Resting with no apparent distress


Chart reviewed








5/30/2020


Patient seen and examined in the ICU


She is wiping her face with a cough


Discussed with RN


Chart reviewed


We hope to get her out of the ICU later today if possible








5/29/2020


Patient seen and examined in the ICU once again


She is back on NG suction


On IV Zosyn


Has IV TPN


Sedated with Precedex but anxious still


Appears somewhat clammy and pale


Chart reviewed


Discussed with RN


She remains critically ill











 


 


BRIEF OPERATIVE NOTE


Pre-Op Diagnosis


Pancreatitis with pseudocysts, suspected infection


Post-Op Diagnosis


same


Procedure Performed


CT abdominal Drains x 3


Surgeon


Hardy


Anesthesia Type:  Conscious Sedation


Findings


3 abdominal drains, 14F, with turbid pancreatic fluid and necrotic debris in 

each.


Complications


No immediate








5/9: Patient today somewhat restless and having bilious secretions from ET tube,

imaging studies ordered, discussed with consultant. Pretty poor prognosis, 

hopefully is not a fistula, poor surgical candidate. 





5/10: Imaging with no acute events, she seems more stable today compared to 

yesterday. Encouraged as much activity as possible patient at high risk for 

severe depression.





Vitals


Vitals





Vital Signs








  Date Time  Temp Pulse Resp B/P (MAP) Pulse Ox O2 Delivery O2 Flow Rate FiO2


 


6/24/20 08:00      Trach Collar  


 


6/24/20 08:00  125 22 113/76 (88) 98   


 


6/24/20 07:25       8.0 


 


6/24/20 04:00 99.9       





 99.9       











Physical Exam


Physical Exam


GENERAL: Propped up in bed, resting quietly 


HEENT: NGT in place. 


NECK:  Tracheostomy 


LUNGS: Diminished aeration bases, no accessory muscle use - CT on left with 

clear fluid


HEART:  S1, S2,regular 


ABDOMEN: Mild distention, bowel sounds present, soft, grimaces to palpation, 

drains x 3


: [place] ( 6/7)


EXTREMITIES: + edema BLE


SKIN: no signs of gen rash 


LUE-PICC (5/29) without signs of complications


General:  Alert, Oriented X3, Cooperative, No acute distress


Heart:  Regular rate (SR/ST), Other (distant heart sounds)


Lungs:  Other (diminshed in bases, Rhonci in LLL)


Abdomen:  Soft


Extremities:  No cyanosis, Other (3+ bilateral LE pitting edema)


Skin:  No rashes, No significant lesion





Labs


LABS





Laboratory Tests








Test


 6/23/20


12:22 6/23/20


17:01 6/24/20


00:36 6/24/20


06:15


 


Glucose (Fingerstick)


 156 mg/dL


(70-99) 157 mg/dL


(70-99) 141 mg/dL


(70-99) 





 


White Blood Count


 


 


 


 13.2 x10^3/uL


(4.0-11.0)


 


Red Blood Count


 


 


 


 3.41 x10^6/uL


(3.50-5.40)


 


Hemoglobin


 


 


 


 9.4 g/dL


(12.0-15.5)


 


Hematocrit


 


 


 


 29.0 %


(36.0-47.0)


 


Mean Corpuscular Volume    85 fL () 


 


Mean Corpuscular Hemoglobin    28 pg (25-35) 


 


Mean Corpuscular Hemoglobin


Concent 


 


 


 33 g/dL


(31-37)


 


Red Cell Distribution Width


 


 


 


 17.5 %


(11.5-14.5)


 


Platelet Count


 


 


 


 449 x10^3/uL


(140-400)


 


Neutrophils (%) (Auto)    81 % (31-73) 


 


Lymphocytes (%) (Auto)    11 % (24-48) 


 


Monocytes (%) (Auto)    6 % (0-9) 


 


Eosinophils (%) (Auto)    1 % (0-3) 


 


Basophils (%) (Auto)    0 % (0-3) 


 


Neutrophils # (Auto)


 


 


 


 10.8 x10^3/uL


(1.8-7.7)


 


Lymphocytes # (Auto)


 


 


 


 1.5 x10^3/uL


(1.0-4.8)


 


Monocytes # (Auto)


 


 


 


 0.8 x10^3/uL


(0.0-1.1)


 


Eosinophils # (Auto)


 


 


 


 0.1 x10^3/uL


(0.0-0.7)


 


Basophils # (Auto)


 


 


 


 0.0 x10^3/uL


(0.0-0.2)


 


Prothrombin Time


 


 


 


 14.0 SEC


(11.7-14.0)


 


Prothromb Time International


Ratio 


 


 


 1.1 (0.8-1.1) 





 


Sodium Level


 


 


 


 135 mmol/L


(136-145)


 


Potassium Level


 


 


 


 4.3 mmol/L


(3.5-5.1)


 


Chloride Level


 


 


 


 99 mmol/L


()


 


Carbon Dioxide Level


 


 


 


 34 mmol/L


(21-32)


 


Anion Gap    2 (6-14) 


 


Blood Urea Nitrogen


 


 


 


 13 mg/dL


(7-20)


 


Creatinine


 


 


 


 0.7 mg/dL


(0.6-1.0)


 


Estimated GFR


(Cockcroft-Gault) 


 


 


 88.9 





 


BUN/Creatinine Ratio    19 (6-20) 


 


Glucose Level


 


 


 


 130 mg/dL


(70-99)


 


Calcium Level


 


 


 


 10.4 mg/dL


(8.5-10.1)


 


Phosphorus Level


 


 


 


 4.2 mg/dL


(2.6-4.7)


 


Magnesium Level


 


 


 


 2.1 mg/dL


(1.8-2.4)


 


Total Bilirubin


 


 


 


 0.4 mg/dL


(0.2-1.0)


 


Aspartate Amino Transf


(AST/SGOT) 


 


 


 23 U/L (15-37) 





 


Alanine Aminotransferase


(ALT/SGPT) 


 


 


 20 U/L (14-59) 





 


Alkaline Phosphatase


 


 


 


 255 U/L


()


 


Total Protein


 


 


 


 4.8 g/dL


(6.4-8.2)


 


Albumin


 


 


 


 1.1 g/dL


(3.4-5.0)


 


Albumin/Globulin Ratio    0.3 (1.0-1.7) 


 


Test


 6/24/20


06:20 


 


 





 


Glucose (Fingerstick)


 133 mg/dL


(70-99) 


 


 














Assessment and Plan


Assessmemt and Plan


Problems


Medical Problems:


(1) Acute pancreatitis


Status: Acute  





(2) Cholelithiasis


Status: Acute  











Comment


Review of Relevant


I have reviewed the following items josy (where applicable) has been applied.


Labs





Laboratory Tests








Test


 6/22/20


11:17 6/22/20


17:15 6/23/20


06:28 6/23/20


12:22


 


Glucose (Fingerstick)


 183 mg/dL


(70-99) 149 mg/dL


(70-99) 101 mg/dL


(70-99) 156 mg/dL


(70-99)


 


Sodium Level


 


 


 136 mmol/L


(136-145) 





 


Potassium Level


 


 


 4.3 mmol/L


(3.5-5.1) 





 


Chloride Level


 


 


 99 mmol/L


() 





 


Carbon Dioxide Level


 


 


 33 mmol/L


(21-32) 





 


Anion Gap   4 (6-14)  


 


Blood Urea Nitrogen


 


 


 15 mg/dL


(7-20) 





 


Creatinine


 


 


 0.7 mg/dL


(0.6-1.0) 





 


Estimated GFR


(Cockcroft-Gault) 


 


 88.9 


 





 


Glucose Level


 


 


 109 mg/dL


(70-99) 





 


Calcium Level


 


 


 10.1 mg/dL


(8.5-10.1) 





 


Triglycerides Level


 


 


 174 mg/dL


(0-150) 





 


Test


 6/23/20


17:01 6/24/20


00:36 6/24/20


06:15 6/24/20


06:20


 


Glucose (Fingerstick)


 157 mg/dL


(70-99) 141 mg/dL


(70-99) 


 133 mg/dL


(70-99)


 


White Blood Count


 


 


 13.2 x10^3/uL


(4.0-11.0) 





 


Red Blood Count


 


 


 3.41 x10^6/uL


(3.50-5.40) 





 


Hemoglobin


 


 


 9.4 g/dL


(12.0-15.5) 





 


Hematocrit


 


 


 29.0 %


(36.0-47.0) 





 


Mean Corpuscular Volume   85 fL ()  


 


Mean Corpuscular Hemoglobin   28 pg (25-35)  


 


Mean Corpuscular Hemoglobin


Concent 


 


 33 g/dL


(31-37) 





 


Red Cell Distribution Width


 


 


 17.5 %


(11.5-14.5) 





 


Platelet Count


 


 


 449 x10^3/uL


(140-400) 





 


Neutrophils (%) (Auto)   81 % (31-73)  


 


Lymphocytes (%) (Auto)   11 % (24-48)  


 


Monocytes (%) (Auto)   6 % (0-9)  


 


Eosinophils (%) (Auto)   1 % (0-3)  


 


Basophils (%) (Auto)   0 % (0-3)  


 


Neutrophils # (Auto)


 


 


 10.8 x10^3/uL


(1.8-7.7) 





 


Lymphocytes # (Auto)


 


 


 1.5 x10^3/uL


(1.0-4.8) 





 


Monocytes # (Auto)


 


 


 0.8 x10^3/uL


(0.0-1.1) 





 


Eosinophils # (Auto)


 


 


 0.1 x10^3/uL


(0.0-0.7) 





 


Basophils # (Auto)


 


 


 0.0 x10^3/uL


(0.0-0.2) 





 


Prothrombin Time


 


 


 14.0 SEC


(11.7-14.0) 





 


Prothromb Time International


Ratio 


 


 1.1 (0.8-1.1) 


 





 


Sodium Level


 


 


 135 mmol/L


(136-145) 





 


Potassium Level


 


 


 4.3 mmol/L


(3.5-5.1) 





 


Chloride Level


 


 


 99 mmol/L


() 





 


Carbon Dioxide Level


 


 


 34 mmol/L


(21-32) 





 


Anion Gap   2 (6-14)  


 


Blood Urea Nitrogen


 


 


 13 mg/dL


(7-20) 





 


Creatinine


 


 


 0.7 mg/dL


(0.6-1.0) 





 


Estimated GFR


(Cockcroft-Gault) 


 


 88.9 


 





 


BUN/Creatinine Ratio   19 (6-20)  


 


Glucose Level


 


 


 130 mg/dL


(70-99) 





 


Calcium Level


 


 


 10.4 mg/dL


(8.5-10.1) 





 


Phosphorus Level


 


 


 4.2 mg/dL


(2.6-4.7) 





 


Magnesium Level


 


 


 2.1 mg/dL


(1.8-2.4) 





 


Total Bilirubin


 


 


 0.4 mg/dL


(0.2-1.0) 





 


Aspartate Amino Transf


(AST/SGOT) 


 


 23 U/L (15-37) 


 





 


Alanine Aminotransferase


(ALT/SGPT) 


 


 20 U/L (14-59) 


 





 


Alkaline Phosphatase


 


 


 255 U/L


() 





 


Total Protein


 


 


 4.8 g/dL


(6.4-8.2) 





 


Albumin


 


 


 1.1 g/dL


(3.4-5.0) 





 


Albumin/Globulin Ratio   0.3 (1.0-1.7)  








Laboratory Tests








Test


 6/23/20


12:22 6/23/20


17:01 6/24/20


00:36 6/24/20


06:15


 


Glucose (Fingerstick)


 156 mg/dL


(70-99) 157 mg/dL


(70-99) 141 mg/dL


(70-99) 





 


White Blood Count


 


 


 


 13.2 x10^3/uL


(4.0-11.0)


 


Red Blood Count


 


 


 


 3.41 x10^6/uL


(3.50-5.40)


 


Hemoglobin


 


 


 


 9.4 g/dL


(12.0-15.5)


 


Hematocrit


 


 


 


 29.0 %


(36.0-47.0)


 


Mean Corpuscular Volume    85 fL () 


 


Mean Corpuscular Hemoglobin    28 pg (25-35) 


 


Mean Corpuscular Hemoglobin


Concent 


 


 


 33 g/dL


(31-37)


 


Red Cell Distribution Width


 


 


 


 17.5 %


(11.5-14.5)


 


Platelet Count


 


 


 


 449 x10^3/uL


(140-400)


 


Neutrophils (%) (Auto)    81 % (31-73) 


 


Lymphocytes (%) (Auto)    11 % (24-48) 


 


Monocytes (%) (Auto)    6 % (0-9) 


 


Eosinophils (%) (Auto)    1 % (0-3) 


 


Basophils (%) (Auto)    0 % (0-3) 


 


Neutrophils # (Auto)


 


 


 


 10.8 x10^3/uL


(1.8-7.7)


 


Lymphocytes # (Auto)


 


 


 


 1.5 x10^3/uL


(1.0-4.8)


 


Monocytes # (Auto)


 


 


 


 0.8 x10^3/uL


(0.0-1.1)


 


Eosinophils # (Auto)


 


 


 


 0.1 x10^3/uL


(0.0-0.7)


 


Basophils # (Auto)


 


 


 


 0.0 x10^3/uL


(0.0-0.2)


 


Prothrombin Time


 


 


 


 14.0 SEC


(11.7-14.0)


 


Prothromb Time International


Ratio 


 


 


 1.1 (0.8-1.1) 





 


Sodium Level


 


 


 


 135 mmol/L


(136-145)


 


Potassium Level


 


 


 


 4.3 mmol/L


(3.5-5.1)


 


Chloride Level


 


 


 


 99 mmol/L


()


 


Carbon Dioxide Level


 


 


 


 34 mmol/L


(21-32)


 


Anion Gap    2 (6-14) 


 


Blood Urea Nitrogen


 


 


 


 13 mg/dL


(7-20)


 


Creatinine


 


 


 


 0.7 mg/dL


(0.6-1.0)


 


Estimated GFR


(Cockcroft-Gault) 


 


 


 88.9 





 


BUN/Creatinine Ratio    19 (6-20) 


 


Glucose Level


 


 


 


 130 mg/dL


(70-99)


 


Calcium Level


 


 


 


 10.4 mg/dL


(8.5-10.1)


 


Phosphorus Level


 


 


 


 4.2 mg/dL


(2.6-4.7)


 


Magnesium Level


 


 


 


 2.1 mg/dL


(1.8-2.4)


 


Total Bilirubin


 


 


 


 0.4 mg/dL


(0.2-1.0)


 


Aspartate Amino Transf


(AST/SGOT) 


 


 


 23 U/L (15-37) 





 


Alanine Aminotransferase


(ALT/SGPT) 


 


 


 20 U/L (14-59) 





 


Alkaline Phosphatase


 


 


 


 255 U/L


()


 


Total Protein


 


 


 


 4.8 g/dL


(6.4-8.2)


 


Albumin


 


 


 


 1.1 g/dL


(3.4-5.0)


 


Albumin/Globulin Ratio    0.3 (1.0-1.7) 


 


Test


 6/24/20


06:20 


 


 





 


Glucose (Fingerstick)


 133 mg/dL


(70-99) 


 


 











Microbiology


6/18/20 Blood Culture - Final, Complete


          NO GROWTH AFTER 5 DAYS


6/15/20 Gram Stain - Final, Complete


          


6/15/20 Aerobic and Anaerobic Culture - Final, Complete


          


6/13/20 Gram Stain Evaluation - Final, Complete


          


6/13/20 Respiratory Culture - Final, Complete


          


6/13/20 Antimicrobic Susceptibility - Final, Complete


          


6/7/20 Urine Culture - Final, Complete


         


5/30/20 Gram Stain - Final, Complete


          


5/30/20 Aerobic Culture - Final, Complete


Medications





Current Medications


Sodium Chloride 1,000 ml @  1,000 mls/hr Q1H IV  Last administered on 3/16/20at 

03:00;  Start 3/16/20 at 03:00;  Stop 3/16/20 at 03:59;  Status DC


Ondansetron HCl (Zofran) 4 mg 1X  ONCE IVP  Last administered on 3/16/20at 

03:27;  Start 3/16/20 at 03:00;  Stop 3/16/20 at 03:01;  Status DC


Morphine Sulfate (Morphine Sulfate) 4 mg 1X  ONCE IV ;  Start 3/16/20 at 03:00; 

Stop 3/16/20 at 03:01;  Status Cancel


Ketorolac Tromethamine (Toradol 30mg Vial) 30 mg 1X  ONCE IV  Last administered 

on 3/16/20at 02:54;  Start 3/16/20 at 03:00;  Stop 3/16/20 at 03:01;  Status DC


Fentanyl Citrate (Fentanyl 2ml Vial) 25 mcg 1X  ONCE IVP  Last administered on 

3/16/20at 03:23;  Start 3/16/20 at 03:30;  Stop 3/16/20 at 03:31;  Status DC


Fentanyl Citrate (Fentanyl 2ml Vial) 100 mcg STK-MED ONCE .ROUTE ;  Start 

3/16/20 at 03:18;  Stop 3/16/20 at 03:18;  Status DC


Iohexol (Omnipaque 350 Mg/ml) 90 ml 1X  ONCE IV  Last administered on 3/16/20at 

03:25;  Start 3/16/20 at 03:30;  Stop 3/16/20 at 03:31;  Status DC


Info (CONTRAST GIVEN -- Rx MONITORING) 1 each PRN DAILY  PRN MC SEE COMMENTS;  

Start 3/16/20 at 03:30;  Stop 3/18/20 at 03:29;  Status DC


Hydromorphone HCl (Dilaudid) 0.5 mg 1X  ONCE IV  Last administered on 3/16/20at 

03:55;  Start 3/16/20 at 04:30;  Stop 3/16/20 at 04:32;  Status DC


Ondansetron HCl (Zofran) 4 mg PRN Q8HRS  PRN IV NAUSEA/VOMITING 1ST CHOICE;  

Start 3/16/20 at 05:00;  Stop 3/16/20 at 09:27;  Status DC


Morphine Sulfate (Morphine Sulfate) 2 mg PRN Q2HR  PRN IV SEVERE PAIN 7-10 Last 

administered on 3/17/20at 12:26;  Start 3/16/20 at 05:00;  Stop 3/17/20 at 

14:15;  Status DC


Sodium Chloride 1,000 ml @  125 mls/hr Q8H IV  Last administered on 3/16/20at 

20:56;  Start 3/16/20 at 05:00;  Stop 3/17/20 at 04:59;  Status DC


Hydromorphone HCl (Dilaudid) 0.5 mg PRN Q3HRS  PRN IV SEVERE PAIN 7-10 Last 

administered on 3/17/20at 10:06;  Start 3/16/20 at 05:00;  Stop 3/17/20 at 

12:01;  Status DC


Piperacillin Sod/ Tazobactam Sod 4.5 gm/Sodium Chloride 100 ml @  200 mls/hr 1X 

ONCE IV  Last administered on 3/16/20at 05:44;  Start 3/16/20 at 06:00;  Stop 

3/16/20 at 06:29;  Status DC


Ondansetron HCl (Zofran) 4 mg PRN Q4HRS  PRN IV NAUSEA/VOMITING 1ST CHOICE Last 

administered on 6/23/20at 10:32;  Start 3/16/20 at 09:30


Insulin Human Lispro (HumaLOG) 0-9 UNITS Q6HRS SQ  Last administered on 

6/22/20at 11:19;  Start 3/16/20 at 09:30


Dextrose (Dextrose 50%-Water Syringe) 12.5 gm PRN Q15MIN  PRN IV SEE COMMENTS;  

Start 3/16/20 at 09:30


Pantoprazole Sodium (PROTONIX VIAL for IV PUSH) 40 mg DAILYAC IVP  Last 

administered on 6/24/20at 08:05;  Start 3/16/20 at 11:30


Prochlorperazine Edisylate (Compazine) 10 mg PRN Q6HRS  PRN IV NAUSEA/VOMITING, 

2nd CHOICE Last administered on 6/24/20at 02:49;  Start 3/16/20 at 17:45


Atenolol (Tenormin) 100 mg DAILY PO ;  Start 3/17/20 at 09:00;  Stop 3/16/20 at 

20:08;  Status DC


Metoprolol Tartrate (Lopressor Vial) 2.5 mg Q6HRS IVP  Last administered on 

3/17/20at 05:51;  Start 3/16/20 at 20:15;  Stop 3/17/20 at 10:02;  Status DC


Metoprolol Tartrate (Lopressor Vial) 5 mg Q6HRS IVP  Last administered on 

3/26/20at 00:12;  Start 3/17/20 at 10:15;  Stop 3/28/20 at 08:48;  Status DC


Hydromorphone HCl (Dilaudid) 1 mg PRN Q3HRS  PRN IV SEVERE PAIN 7-10 Last admin

istered on 3/23/20at 05:13;  Start 3/17/20 at 12:00;  Stop 3/31/20 at 00:25;  

Status DC


Lidocaine HCl (Buffered Lidocaine 1%) 3 ml STK-MED ONCE .ROUTE ;  Start 3/17/20 

at 12:55;  Stop 3/17/20 at 12:56;  Status DC


Albumin Human 500 ml @  125 mls/hr 1X  ONCE IV  Last administered on 3/17/20at 

14:33;  Start 3/17/20 at 14:30;  Stop 3/17/20 at 18:32;  Status DC


Norepinephrine Bitartrate 8 mg/ Dextrose 258 ml @  17.299 mls/ hr CONT  PRN IV 

PER PROTOCOL Last administered on 4/14/20at 12:48;  Start 3/17/20 at 15:30;  

Stop 4/17/20 at 09:19;  Status DC


Sodium Chloride 1,000 ml @  125 mls/hr Q8H IV  Last administered on 3/17/20at 

21:04;  Start 3/17/20 at 16:00;  Stop 3/18/20 at 02:42;  Status DC


Albumin Human 500 ml @  125 mls/hr PRN BID  PRN IV After every 2L NSS & BP < 

90mm Last administered on 6/6/20at 11:40;  Start 3/17/20 at 16:00


Iohexol (Omnipaque 300 Mg/ml) 60 ml 1X  ONCE IV  Last administered on 3/17/20at 

17:20;  Start 3/17/20 at 17:00;  Stop 3/17/20 at 17:01;  Status DC


Info (CONTRAST GIVEN -- Rx MONITORING) 1 each PRN DAILY  PRN MC SEE COMMENTS;  

Start 3/17/20 at 17:00;  Stop 3/19/20 at 16:59;  Status DC


Meropenem 1 gm/ Sodium Chloride 100 ml @  200 mls/hr Q8HRS IV  Last administered

on 3/18/20at 05:45;  Start 3/17/20 at 20:00;  Stop 3/18/20 at 08:48;  Status DC


Furosemide (Lasix) 40 mg 1X  ONCE IVP  Last administered on 3/17/20at 22:12;  

Start 3/17/20 at 22:30;  Stop 3/17/20 at 22:31;  Status DC


Calcium Chloride 1000 mg/Sodium Chloride 110 ml @  220 mls/hr 1X  ONCE IV  Last 

administered on 3/17/20at 22:11;  Start 3/17/20 at 22:30;  Stop 3/17/20 at 

22:59;  Status DC


Albuterol Sulfate (Ventolin Neb Soln) 2.5 mg 1X  ONCE NEB  Last administered on 

3/18/20at 00:56;  Start 3/17/20 at 22:30;  Stop 3/17/20 at 22:31;  Status DC


Insulin Human Regular (HumuLIN R VIAL) 5 unit 1X  ONCE IV  Last administered on 

3/17/20at 22:14;  Start 3/17/20 at 22:30;  Stop 3/17/20 at 22:31;  Status DC


Magnesium Sulfate 50 ml @ 25 mls/hr 1X  ONCE IV  Last administered on 3/18/20at 

02:57;  Start 3/18/20 at 03:00;  Stop 3/18/20 at 04:59;  Status DC


Calcium Gluconate 1000 mg/Sodium Chloride 110 ml @  220 mls/hr 1X  ONCE IV  Last

administered on 3/18/20at 02:46;  Start 3/18/20 at 03:00;  Stop 3/18/20 at 

03:29;  Status DC


Sodium Chloride 1,000 ml @  200 mls/hr Q5H IV  Last administered on 3/18/20at 

02:46;  Start 3/18/20 at 03:00;  Stop 3/18/20 at 10:21;  Status DC


Calcium Gluconate 1000 mg/Sodium Chloride 110 ml @  220 mls/hr 1X  ONCE IV  Last

administered on 3/18/20at 03:21;  Start 3/18/20 at 03:30;  Stop 3/18/20 at 

03:59;  Status DC


Sodium Bicarbonate 50 meq/Sodium Chloride 1,050 ml @  75 mls/hr Q14H IV  Last 

administered on 3/22/20at 21:10;  Start 3/18/20 at 07:30;  Stop 3/23/20 at 

10:28;  Status DC


Calcium Gluconate 2000 mg/Sodium Chloride 120 ml @  220 mls/hr 1X  ONCE IV  Last

administered on 3/18/20at 09:05;  Start 3/18/20 at 07:30;  Stop 3/18/20 at 

08:02;  Status DC


Lidocaine HCl (Xylocaine-Mpf 1% 2ml Vial) 2 ml STK-MED ONCE .ROUTE ;  Start 

3/18/20 at 08:47;  Stop 3/18/20 at 08:47;  Status DC


Meropenem 500 mg/ Sodium Chloride 50 ml @  100 mls/hr Q12HR IV  Last 

administered on 3/23/20at 21:01;  Start 3/18/20 at 18:00;  Stop 3/24/20 at 

07:58;  Status DC


Lidocaine HCl (Buffered Lidocaine 1%) 3 ml STK-MED ONCE .ROUTE ;  Start 3/18/20 

at 09:46;  Stop 3/18/20 at 09:46;  Status DC


Lidocaine HCl (Buffered Lidocaine 1%) 6 ml 1X  ONCE INJ  Last administered on 

3/18/20at 10:26;  Start 3/18/20 at 10:15;  Stop 3/18/20 at 10:16;  Status DC


Info (Tpn Per Pharmacy) 1 each PRN DAILY  PRN MC SEE COMMENTS Last administered 

on 6/23/20at 12:45;  Start 3/18/20 at 12:00


Sodium Chloride 1,000 ml @  1,000 mls/hr Q1H PRN IV hypotension;  Start 3/18/20 

at 12:07;  Stop 3/18/20 at 18:06;  Status DC


Diphenhydramine HCl (Benadryl) 25 mg 1X PRN  PRN IV ITCHING;  Start 3/18/20 at 

12:15;  Stop 3/19/20 at 12:14;  Status DC


Diphenhydramine HCl (Benadryl) 25 mg 1X PRN  PRN IV ITCHING;  Start 3/18/20 at 

12:15;  Stop 3/19/20 at 12:14;  Status DC


Sodium Chloride 1,000 ml @  400 mls/hr Q2H30M PRN IV PATENCY;  Start 3/18/20 at 

12:07;  Stop 3/19/20 at 00:06;  Status DC


Info (PHARMACY MONITORING -- do not chart) 1 each PRN DAILY  PRN MC SEE 

COMMENTS;  Start 3/18/20 at 12:15;  Stop 3/20/20 at 08:13;  Status DC


Sodium Chloride 90 meq/Calcium Gluconate 10 meq/ Multivitamins 10 ml/Chromium/ 

Copper/Manganese/ Seleni/Zn 1 ml/ Total Parenteral Nutrition/Amino 

Acids/Dextrose/ Fat Emulsion Intravenous 55.005 ml  @ 2.292 mls/hr TPN  CONT IV 

;  Start 3/18/20 at 22:00;  Stop 3/18/20 at 12:33;  Status DC


Info (Tpn Per Pharmacy) 1 each PRN DAILY  PRN MC SEE COMMENTS;  Start 3/18/20 at

12:30;  Status UNV


Sodium Chloride 90 meq/Calcium Gluconate 10 meq/ Multivitamins 10 ml/Chromium/ 

Copper/Manganese/ Seleni/Zn 0.5 ml/ Total Parenteral Nutrition/Amino 

Acids/Dextrose/ Fat Emulsion Intravenous 1,512 ml @  63 mls/hr TPN  CONT IV  

Last administered on 3/18/20at 22:06;  Start 3/18/20 at 22:00;  Stop 3/19/20 at 

21:59;  Status DC


Calcium Carbonate/ Glycine (Tums) 500 mg PRN AFTMEALHC  PRN PO INDIGESTION;  

Start 3/18/20 at 17:45;  Stop 5/13/20 at 10:25;  Status DC


Calcium Gluconate (Calcium Gluconate) 2,000 mg 1X  ONCE IVP  Last administered 

on 3/19/20at 02:19;  Start 3/19/20 at 02:15;  Stop 3/19/20 at 02:16;  Status DC


Calcium Chloride 3000 mg/Sodium Chloride 1,030 ml @  50 mls/hr [id number] IV  Last 

administered on 3/21/20at 02:17;  Start 3/19/20 at 08:00;  Stop 3/21/20 at 

15:23;  Status DC


Lorazepam (Ativan Inj) 1 mg PRN Q4HRS  PRN IVP ANXIETY / AGITATION, 2nd choic 

Last administered on 4/17/20at 03:51;  Start 3/19/20 at 09:00;  Stop 4/17/20 at 

09:19;  Status DC


Sodium Chloride 1,000 ml @  1,000 mls/hr Q1H PRN IV hypotension;  Start 3/19/20 

at 08:56;  Stop 3/19/20 at 14:55;  Status DC


Albumin Human 200 ml @  200 mls/hr 1X PRN  PRN IV Hypotension;  Start 3/19/20 at

09:00;  Stop 3/19/20 at 14:59;  Status DC


Diphenhydramine HCl (Benadryl) 25 mg 1X PRN  PRN IV ITCHING;  Start 3/19/20 at 

09:00;  Stop 3/20/20 at 08:59;  Status DC


Diphenhydramine HCl (Benadryl) 25 mg 1X PRN  PRN IV ITCHING;  Start 3/19/20 at 

09:00;  Stop 3/20/20 at 08:59;  Status DC


Sodium Chloride 1,000 ml @  400 mls/hr Q2H30M PRN IV PATENCY;  Start 3/19/20 at 

08:56;  Stop 3/19/20 at 20:55;  Status DC


Info (PHARMACY MONITORING -- do not chart) 1 each PRN DAILY  PRN MC SEE 

COMMENTS;  Start 3/19/20 at 09:00;  Status UNV


Info (PHARMACY MONITORING -- do not chart) 1 each PRN DAILY  PRN MC SEE 

COMMENTS;  Start 3/19/20 at 09:00;  Stop 3/20/20 at 08:13;  Status DC


Digoxin (Lanoxin) 500 mcg 1X  ONCE IV  Last administered on 3/19/20at 10:04;  

Start 3/19/20 at 10:00;  Stop 3/19/20 at 10:01;  Status DC


Digoxin (Lanoxin) 125 mcg 1X  ONCE IV  Last administered on 3/19/20at 17:10;  

Start 3/19/20 at 18:00;  Stop 3/19/20 at 18:01;  Status DC


Magnesium Sulfate 100 ml @  25 mls/hr 1X  ONCE IV  Last administered on 

3/19/20at 12:48;  Start 3/19/20 at 13:00;  Stop 3/19/20 at 16:59;  Status DC


Sodium Chloride 90 meq/Magnesium Sulfate 10 meq/ Calcium Gluconate 20 meq/ 

Multivitamins 10 ml/Chromium/ Copper/Manganese/ Seleni/Zn 0.5 ml/ Total 

Parenteral Nutrition/Amino Acids/Dextrose/ Fat Emulsion Intravenous 1,512 ml @  

63 mls/hr TPN  CONT IV  Last administered on 3/19/20at 22:25;  Start 3/19/20 at 

22:00;  Stop 3/20/20 at 21:59;  Status DC


Sodium Chloride 1,000 ml @  1,000 mls/hr Q1H PRN IV hypotension;  Start 3/20/20 

at 08:05;  Stop 3/20/20 at 14:04;  Status DC


Albumin Human 200 ml @  200 mls/hr 1X  ONCE IV  Last administered on 3/20/20at 

08:57;  Start 3/20/20 at 08:15;  Stop 3/20/20 at 09:14;  Status DC


Diphenhydramine HCl (Benadryl) 25 mg 1X PRN  PRN IV ITCHING;  Start 3/20/20 at 

08:15;  Stop 3/21/20 at 08:14;  Status DC


Diphenhydramine HCl (Benadryl) 25 mg 1X PRN  PRN IV ITCHING;  Start 3/20/20 at 

08:15;  Stop 3/21/20 at 08:14;  Status DC


Sodium Chloride 1,000 ml @  400 mls/hr Q2H30M PRN IV PATENCY;  Start 3/20/20 at 

08:05;  Stop 3/20/20 at 20:04;  Status DC


Info (PHARMACY MONITORING -- do not chart) 1 each PRN DAILY  PRN MC SEE 

COMMENTS;  Start 3/20/20 at 08:15;  Stop 3/24/20 at 07:57;  Status DC


Sodium Chloride 90 meq/Potassium Chloride 15 meq/ Potassium Phosphate 10 mmol/ 

Magnesium Sulfate 10 meq/Calcium Gluconate 20 meq/ Multivitamins 10 ml/Chromium/

Copper/Manganese/ Seleni/Zn 0.5 ml/ Total Parenteral Nutrition/Amino 

Acids/Dextrose/ Fat Emulsion Intravenous 1,512 ml @  63 mls/hr TPN  CONT IV  

Last administered on 3/20/20at 21:01;  Start 3/20/20 at 22:00;  Stop 3/21/20 at 

21:59;  Status DC


Potassium Chloride/Water 100 ml @  100 mls/hr 1X  ONCE IV  Last administered on 

3/20/20at 14:09;  Start 3/20/20 at 14:00;  Stop 3/20/20 at 14:59;  Status DC


Benzocaine (Hurricaine One) 1 spray 1X  ONCE MM  Last administered on 3/20/20at 

16:38;  Start 3/20/20 at 14:30;  Stop 3/20/20 at 14:31;  Status DC


Lidocaine HCl (Glydo (Lidocaine) Jelly) 1 thomas 1X  ONCE MM  Last administered on 

3/20/20at 16:38;  Start 3/20/20 at 14:30;  Stop 3/20/20 at 14:31;  Status DC


Linezolid/Dextrose 300 ml @  300 mls/hr Q12HR IV  Last administered on 3/26/20at

21:04;  Start 3/20/20 at 20:00;  Stop 3/27/20 at 07:50;  Status DC


Acetaminophen (Tylenol) 650 mg PRN Q6HRS  PRN PO MILD PAIN / TEMP;  Start 3/21/2

0 at 03:30;  Stop 3/21/20 at 03:36;  Status DC


Acetaminophen (Tylenol) 650 mg PRN Q6HRS  PRN PEG MILD PAIN / TEMP Last 

administered on 4/16/20at 19:56;  Start 3/21/20 at 03:36;  Stop 5/13/20 at 

10:25;  Status DC


Sodium Chloride 1,000 ml @  1,000 mls/hr Q1H PRN IV hypotension;  Start 3/21/20 

at 07:50;  Stop 3/21/20 at 13:49;  Status DC


Albumin Human 200 ml @  200 mls/hr 1X PRN  PRN IV Hypotension;  Start 3/21/20 at

08:00;  Stop 3/21/20 at 13:59;  Status DC


Sodium Chloride (Normal Saline Flush) 10 ml 1X PRN  PRN IV AP catheter pack;  

Start 3/21/20 at 08:00;  Stop 3/22/20 at 07:59;  Status DC


Sodium Chloride (Normal Saline Flush) 10 ml 1X PRN  PRN IV  catheter pack;  

Start 3/21/20 at 08:00;  Stop 3/22/20 at 07:59;  Status DC


Sodium Chloride 1,000 ml @  400 mls/hr Q2H30M PRN IV PATENCY;  Start 3/21/20 at 

07:50;  Stop 3/21/20 at 19:49;  Status DC


Info (PHARMACY MONITORING -- do not chart) 1 each PRN DAILY  PRN MC SEE 

COMMENTS;  Start 3/21/20 at 08:00;  Status UNV


Info (PHARMACY MONITORING -- do not chart) 1 each PRN DAILY  PRN MC SEE 

COMMENTS;  Start 3/21/20 at 08:00;  Stop 3/23/20 at 08:25;  Status DC


Sodium Chloride 90 meq/Potassium Chloride 15 meq/ Potassium Phosphate 10 mmol/ 

Magnesium Sulfate 10 meq/Calcium Gluconate 20 meq/ Multivitamins 10 ml/Chromium/

Copper/Manganese/ Seleni/Zn 0.5 ml/ Total Parenteral Nutrition/Amino 

Acids/Dextrose/ Fat Emulsion Intravenous 1,512 ml @  63 mls/hr TPN  CONT IV  

Last administered on 3/21/20at 20:57;  Start 3/21/20 at 22:00;  Stop 3/22/20 at 

21:59;  Status DC


Sodium Chloride 90 meq/Potassium Chloride 15 meq/ Potassium Phosphate 15 mmol/ 

Magnesium Sulfate 10 meq/Calcium Gluconate 20 meq/ Multivitamins 10 ml/Chromium/

Copper/Manganese/ Seleni/Zn 0.5 ml/ Total Parenteral Nutrition/Amino 

Acids/Dextrose/ Fat Emulsion Intravenous 1,512 ml @  63 mls/hr TPN  CONT IV ;  

Start 3/22/20 at 22:00;  Stop 3/22/20 at 14:16;  Status DC


Sodium Chloride 90 meq/Potassium Chloride 15 meq/ Potassium Phosphate 15 mmol/ 

Magnesium Sulfate 10 meq/Calcium Gluconate 20 meq/ Multivitamins 10 ml/Chromium/

Copper/Manganese/ Seleni/Zn 0.5 ml/ Total Parenteral Nutrition/Amino 

Acids/Dextrose/ Fat Emulsion Intravenous 1,200 ml @  50 mls/hr TPN  CONT IV ;  

Start 3/22/20 at 22:00;  Stop 3/22/20 at 14:17;  Status DC


Sodium Chloride 90 meq/Potassium Chloride 15 meq/ Potassium Phosphate 10 mmol/ 

Magnesium Sulfate 10 meq/Calcium Gluconate 20 meq/ Multivitamins 10 ml/Chromium/

Copper/Manganese/ Seleni/Zn 0.5 ml/ Total Parenteral Nutrition/Amino 

Acids/Dextrose/ Fat Emulsion Intravenous 1,200 ml @  50 mls/hr TPN  CONT IV  

Last administered on 3/22/20at 23:29;  Start 3/22/20 at 22:00;  Stop 3/23/20 at 

21:59;  Status DC


Sodium Chloride 1,000 ml @  1,000 mls/hr Q1H PRN IV hypotension;  Start 3/23/20 

at 07:28;  Stop 3/23/20 at 13:27;  Status DC


Albumin Human 200 ml @  200 mls/hr 1X  ONCE IV  Last administered on 3/23/20at 

08:51;  Start 3/23/20 at 07:30;  Stop 3/23/20 at 08:29;  Status DC


Diphenhydramine HCl (Benadryl) 25 mg 1X PRN  PRN IV ITCHING;  Start 3/23/20 at 

07:30;  Stop 3/24/20 at 07:29;  Status DC


Diphenhydramine HCl (Benadryl) 25 mg 1X PRN  PRN IV ITCHING;  Start 3/23/20 at 

07:30;  Stop 3/24/20 at 07:29;  Status DC


Sodium Chloride 1,000 ml @  400 mls/hr Q2H30M PRN IV PATENCY;  Start 3/23/20 at 

07:28;  Stop 3/23/20 at 19:27;  Status DC


Info (PHARMACY MONITORING -- do not chart) 1 each PRN DAILY  PRN MC SEE 

COMMENTS;  Start 3/23/20 at 07:30;  Stop 4/3/20 at 13:01;  Status DC


Metronidazole 100 ml @  100 mls/hr Q6HRS IV  Last administered on 4/8/20at 

06:26;  Start 3/23/20 at 08:30;  Stop 4/8/20 at 09:58;  Status DC


Micafungin Sodium 100 mg/Dextrose 100 ml @  100 mls/hr Q24H IV  Last 

administered on 4/30/20at 08:18;  Start 3/23/20 at 09:00;  Stop 4/30/20 at 

20:58;  Status DC


Propofol 0 ml @ As Directed STK-MED ONCE IV ;  Start 3/23/20 at 07:53;  Stop 

3/23/20 at 07:53;  Status DC


Etomidate (Amidate) 20 mg STK-MED ONCE IV ;  Start 3/23/20 at 07:53;  Stop 

3/23/20 at 07:54;  Status DC


Midazolam HCl (Versed) 5 mg STK-MED ONCE .ROUTE ;  Start 3/23/20 at 07:57;  Stop

3/23/20 at 07:57;  Status DC


Fentanyl Citrate 30 ml @ 0 mls/hr CONT  PRN IV SEE PROTOCOL Last administered on

4/17/20at 06:12;  Start 3/23/20 at 08:15;  Stop 4/17/20 at 09:19;  Status DC


Artificial Tears (Artificial Tears) 1 drop PRN Q1HR  PRN OU DRY EYE, 1st choice;

 Start 3/23/20 at 08:15;  Stop 4/29/20 at 05:31;  Status DC


Midazolam HCl 50 mg/Sodium Chloride 50 ml @ 0 mls/hr CONT  PRN IV SEE PROTOCOL 

Last administered on 3/26/20at 22:39;  Start 3/23/20 at 08:15;  Stop 3/28/20 at 

15:59;  Status DC


Etomidate (Amidate) 8 mg 1X  ONCE IV  Last administered on 3/23/20at 08:33;  

Start 3/23/20 at 08:30;  Stop 3/23/20 at 08:31;  Status DC


Succinylcholine Chloride (Anectine) 120 mg 1X  ONCE IV  Last administered on 

3/23/20at 08:34;  Start 3/23/20 at 08:30;  Stop 3/23/20 at 08:31;  Status DC


Midazolam HCl (Versed) 5 mg 1X  ONCE IV ;  Start 3/23/20 at 08:30;  Stop 3/23/20

at 08:31;  Status DC


Potassium Chloride 15 meq/ Bicarbonate Dialysis Soln w/ out KCl 5,007.5 ml  @ 

1,000 mls/ hr Q5H1M IV  Last administered on 3/24/20at 11:11;  Start 3/23/20 at 

12:00;  Stop 3/24/20 at 11:15;  Status DC


Potassium Chloride 15 meq/ Bicarbonate Dialysis Soln w/ out KCl 5,007.5 ml  @ 

1,000 mls/ hr Q5H1M IV  Last administered on 3/24/20at 11:12;  Start 3/23/20 at 

12:00;  Stop 3/24/20 at 11:17;  Status DC


Potassium Chloride 15 meq/ Bicarbonate Dialysis Soln w/ out KCl 5,007.5 ml  @ 

1,000 mls/ hr Q5H1M IV  Last administered on 3/24/20at 11:11;  Start 3/23/20 at 

12:00;  Stop 3/24/20 at 11:19;  Status DC


Sodium Chloride 90 meq/Potassium Chloride 15 meq/ Potassium Phosphate 10 mmol/ 

Magnesium Sulfate 10 meq/Calcium Gluconate 20 meq/ Multivitamins 10 ml/Chromium/

Copper/Manganese/ Seleni/Zn 0.5 ml/ Total Parenteral Nutrition/Amino Acids

/Dextrose/ Fat Emulsion Intravenous 1,400 ml @  58.333 mls/ hr TPN  CONT IV  

Last administered on 3/23/20at 21:42;  Start 3/23/20 at 22:00;  Stop 3/24/20 at 

21:59;  Status DC


Heparin Sodium (Porcine) (Heparin Sodium) 5,000 unit Q8HRS SQ  Last administered

on 3/28/20at 05:55;  Start 3/23/20 at 15:00;  Stop 3/28/20 at 13:28;  Status DC


Meropenem 500 mg/ Sodium Chloride 50 ml @  100 mls/hr Q6HRS IV  Last 

administered on 3/25/20at 06:00;  Start 3/24/20 at 09:00;  Stop 3/25/20 at 

07:29;  Status DC


Potassium Phosphate 20 mmol/ Sodium Chloride 106.6667 ml @  51.667 m... 1X  ONCE

IV  Last administered on 3/24/20at 11:22;  Start 3/24/20 at 10:15;  Stop 3/24/20

at 12:18;  Status DC


Acetaminophen (Tylenol Supp) 650 mg PRN Q6HRS  PRN ID MILD PAIN / TEMP > 100.3'F

Last administered on 6/18/20at 10:16;  Start 3/24/20 at 10:30


Potassium Chloride/Water 100 ml @  100 mls/hr Q1H IV  Last administered on 

3/24/20at 12:12;  Start 3/24/20 at 11:00;  Stop 3/24/20 at 12:59;  Status DC


Potassium Chloride 20 meq/ Bicarbonate Dialysis Soln w/ out KCl 5,010 ml @  

1,000 mls/hr Q5H1M IV  Last administered on 3/25/20at 08:48;  Start 3/24/20 at 

12:00;  Stop 3/25/20 at 13:03;  Status DC


Potassium Chloride 20 meq/ Bicarbonate Dialysis Soln w/ out KCl 5,010 ml @  

1,000 mls/hr Q5H1M IV  Last administered on 3/29/20at 14:52;  Start 3/24/20 at 

11:30;  Stop 3/29/20 at 19:59;  Status DC


Potassium Chloride 20 meq/ Bicarbonate Dialysis Soln w/ out KCl 5,010 ml @  

1,000 mls/hr Q5H1M IV  Last administered on 3/29/20at 14:53;  Start 3/24/20 at 

11:30;  Stop 3/29/20 at 19:59;  Status DC


Sodium Chloride 90 meq/Potassium Chloride 15 meq/ Potassium Phosphate 15 mmol/ 

Magnesium Sulfate 10 meq/Calcium Gluconate 15 meq/ Multivitamins 10 ml/Chromium/

Copper/Manganese/ Seleni/Zn 0.5 ml/ Total Parenteral Nutrition/Amino 

Acids/Dextrose/ Fat Emulsion Intravenous 1,400 ml @  58.333 mls/ hr TPN  CONT IV

 Last administered on 3/24/20at 22:17;  Start 3/24/20 at 22:00;  Stop 3/25/20 at

21:59;  Status DC


Cefepime HCl (Maxipime) 2 gm Q12HR IVP  Last administered on 4/7/20at 20:56;  

Start 3/25/20 at 09:00;  Stop 4/8/20 at 09:58;  Status DC


Daptomycin 500 mg/ Sodium Chloride 50 ml @  100 mls/hr Q48H IV  Last 

administered on 4/10/20at 09:57;  Start 3/25/20 at 08:30;  Stop 4/10/20 at 

10:07;  Status DC


Lidocaine HCl (Buffered Lidocaine 1%) 3 ml 1X  ONCE INJ  Last administered on 

3/25/20at 10:27;  Start 3/25/20 at 10:30;  Stop 3/25/20 at 10:31;  Status DC


Potassium Phosphate 20 mmol/ Sodium Chloride 106.6667 ml @  51.667 m... 1X  ONCE

IV  Last administered on 3/25/20at 12:51;  Start 3/25/20 at 13:00;  Stop 3/25/20

at 15:03;  Status DC


Sodium Chloride 90 meq/Potassium Chloride 15 meq/ Potassium Phosphate 18 mmol/ 

Magnesium Sulfate 8 meq/Calcium Gluconate 15 meq/ Multivitamins 10 ml/Chromium/ 

Copper/Manganese/ Seleni/Zn 0.5 ml/ Total Parenteral Nutrition/Amino 

Acids/Dextrose/ Fat Emulsion Intravenous 1,400 ml @  58.333 mls/ hr TPN  CONT IV

 Last administered on 3/25/20at 22:16;  Start 3/25/20 at 22:00;  Stop 3/26/20 at

21:59;  Status DC


Potassium Chloride 20 meq/ Bicarbonate Dialysis Soln w/ out KCl 5,010 ml @  

1,000 mls/hr Q5H1M IV  Last administered on 3/29/20at 14:54;  Start 3/25/20 at 

16:00;  Stop 3/29/20 at 19:59;  Status DC


Multi-Ingred Cream/Lotion/Oil/ Oint (Artificial Tears Eye Ointment) 1 thomas PRN 

Q1HR  PRN OU DRY EYE, 2nd choice Last administered on 4/13/20at 08:19;  Start 

3/25/20 at 17:30;  Stop 6/3/20 at 14:39;  Status DC


Sodium Chloride 90 meq/Potassium Chloride 15 meq/ Potassium Phosphate 18 mmol/ 

Magnesium Sulfate 8 meq/Calcium Gluconate 15 meq/ Multivitamins 10 ml/Chromium/ 

Copper/Manganese/ Seleni/Zn 0.5 ml/ Total Parenteral Nutrition/Amino 

Acids/Dextrose/ Fat Emulsion Intravenous 1,400 ml @  58.333 mls/ hr TPN  CONT IV

 Last administered on 3/26/20at 22:00;  Start 3/26/20 at 22:00;  Stop 3/27/20 at

21:59;  Status DC


Albumin Human 500 ml @  125 mls/hr 1X  ONCE IV ;  Start 3/26/20 at 14:15;  Stop 

3/26/20 at 18:14;  Status DC


Sodium Chloride 90 meq/Potassium Chloride 15 meq/ Potassium Phosphate 18 mmol/ 

Magnesium Sulfate 8 meq/Calcium Gluconate 15 meq/ Multivitamins 10 ml/Chromium/ 

Copper/Manganese/ Seleni/Zn 0.5 ml/ Insulin Human Regular 10 unit/ Total 

Parenteral Nutrition/Amino Acids/Dextrose/ Fat Emulsion Intravenous 1,400 ml @  

58.333 mls/ hr TPN  CONT IV  Last administered on 3/27/20at 21:43;  Start 

3/27/20 at 22:00;  Stop 3/28/20 at 21:59;  Status DC


Lidocaine HCl (Buffered Lidocaine 1%) 3 ml STK-MED ONCE .ROUTE ;  Start 3/25/20 

at 10:00;  Stop 3/27/20 at 13:57;  Status DC


Midazolam HCl 100 mg/Sodium Chloride 100 ml @ 7 mls/hr CONT  PRN IV SEE PROTOCOL

Last administered on 4/8/20at 15:35;  Start 3/28/20 at 16:00;  Stop 6/3/20 at 

14:38;  Status DC


Sodium Chloride 90 meq/Potassium Chloride 15 meq/ Potassium Phosphate 18 mmol/ 

Magnesium Sulfate 8 meq/Calcium Gluconate 15 meq/ Multivitamins 10 ml/Chromium/ 

Copper/Manganese/ Seleni/Zn 0.5 ml/ Insulin Human Regular 15 unit/ Total 

Parenteral Nutrition/Amino Acids/Dextrose/ Fat Emulsion Intravenous 1,400 ml @  

58.333 mls/ hr TPN  CONT IV  Last administered on 3/28/20at 20:34;  Start 

3/28/20 at 22:00;  Stop 3/29/20 at 21:59;  Status DC


Info ([facility] Electrolyte Protocol) 1 ea CONT PRN  PRN MC PER PROTOCOL;  Start 

3/29/20 at 13:15


Sodium Chloride 90 meq/Potassium Chloride 15 meq/ Potassium Phosphate 18 mmol/ 

Magnesium Sulfate 8 meq/Calcium Gluconate 15 meq/ Multivitamins 10 ml/Chromium/ 

Copper/Manganese/ Seleni/Zn 0.5 ml/ Insulin Human Regular 15 unit/ Total 

Parenteral Nutrition/Amino Acids/Dextrose/ Fat Emulsion Intravenous 1,400 ml @  

58.333 mls/ hr TPN  CONT IV  Last administered on 3/29/20at 22:05;  Start 

3/29/20 at 22:00;  Stop 3/30/20 at 21:59;  Status DC


Potassium Chloride 15 meq/ Bicarbonate Dialysis Soln w/ out KCl 5,007.5 ml  @ 

1,000 mls/ hr Q5H1M IV  Last administered on 4/1/20at 18:14;  Start 3/29/20 at 

20:00;  Stop 4/2/20 at 13:08;  Status DC


Potassium Chloride 15 meq/ Bicarbonate Dialysis Soln w/ out KCl 5,007.5 ml  @ 

1,000 mls/ hr Q5H1M IV  Last administered on 4/1/20at 18:14;  Start 3/29/20 at 

20:00;  Stop 4/2/20 at 13:08;  Status DC


Potassium Chloride 15 meq/ Bicarbonate Dialysis Soln w/ out KCl 5,007.5 ml  @ 

1,000 mls/ hr Q5H1M IV  Last administered on 4/1/20at 18:14;  Start 3/29/20 at 

20:00;  Stop 4/2/20 at 13:08;  Status DC


Iohexol (Omnipaque 240 Mg/ml) 30 ml 1X  ONCE PO  Last administered on 3/30/20at 

11:30;  Start 3/30/20 at 11:30;  Stop 3/30/20 at 11:33;  Status DC


Info (CONTRAST GIVEN -- Rx MONITORING) 1 each PRN DAILY  PRN MC SEE COMMENTS;  

Start 3/30/20 at 11:45;  Stop 4/1/20 at 11:44;  Status DC


Sodium Chloride 90 meq/Potassium Chloride 15 meq/ Potassium Phosphate 18 mmol/ 

Magnesium Sulfate 8 meq/Calcium Gluconate 15 meq/ Multivitamins 10 ml/Chromium/ 

Copper/Manganese/ Seleni/Zn 0.5 ml/ Insulin Human Regular 15 unit/ Total 

Parenteral Nutrition/Amino Acids/Dextrose/ Fat Emulsion Intravenous 1,400 ml @  

58.333 mls/ hr TPN  CONT IV  Last administered on 3/30/20at 21:47;  Start 

3/30/20 at 22:00;  Stop 3/31/20 at 21:59;  Status DC


Sodium Chloride 90 meq/Potassium Chloride 15 meq/ Potassium Phosphate 18 mmol/ 

Magnesium Sulfate 8 meq/Calcium Gluconate 15 meq/ Multivitamins 10 ml/Chromium/ 

Copper/Manganese/ Seleni/Zn 0.5 ml/ Insulin Human Regular 20 unit/ Total 

Parenteral Nutrition/Amino Acids/Dextrose/ Fat Emulsion Intravenous 1,400 ml @  

58.333 mls/ hr TPN  CONT IV  Last administered on 3/31/20at 21:36;  Start 

3/31/20 at 22:00;  Stop 4/1/20 at 21:59;  Status DC


Alteplase, Recombinant (Cathflo For Central Catheter Clearance) 1 mg 1X  ONCE 

INT CAT  Last administered on 3/31/20at 20:03;  Start 3/31/20 at 19:30;  Stop 

3/31/20 at 19:46;  Status DC


Alteplase, Recombinant (Cathflo For Central Catheter Clearance) 1 mg 1X  ONCE 

INT CAT  Last administered on 3/31/20at 22:05;  Start 3/31/20 at 22:00;  Stop 

3/31/20 at 22:01;  Status DC


Sodium Chloride 90 meq/Potassium Chloride 15 meq/ Potassium Phosphate 18 mmol/ 

Magnesium Sulfate 8 meq/Calcium Gluconate 15 meq/ Multivitamins 10 ml/Chromium/ 

Copper/Manganese/ Seleni/Zn 0.5 ml/ Insulin Human Regular 20 unit/ Total 

Parenteral Nutrition/Amino Acids/Dextrose/ Fat Emulsion Intravenous 1,400 ml @  

58.333 mls/ hr TPN  CONT IV  Last administered on 4/1/20at 21:30;  Start 4/1/20 

at 22:00;  Stop 4/2/20 at 21:59;  Status DC


Dexmedetomidine HCl 400 mcg/ Sodium Chloride 100 ml @ 0 mls/hr CONT  PRN IV 

ANXIETY / AGITATION Last administered on 5/30/20at 12:57;  Start 4/2/20 at 

08:15;  Stop 5/30/20 at 18:31;  Status DC


Sodium Chloride 500 ml @  500 mls/hr 1X PRN  PRN IV ELEVATED BP, SEE COMMENTS;  

Start 4/2/20 at 08:15


Atropine Sulfate (ATROPINE 0.5mg SYRINGE) 0.5 mg PRN Q5MIN  PRN IV SEE COMMENTS;

 Start 4/2/20 at 08:15


Furosemide (Lasix) 20 mg 1X  ONCE IVP  Last administered on 4/2/20at 08:19;  

Start 4/2/20 at 08:15;  Stop 4/2/20 at 08:16;  Status DC


Lidocaine HCl (Buffered Lidocaine 1%) 3 ml STK-MED ONCE .ROUTE ;  Start 4/2/20 

at 08:39;  Stop 4/2/20 at 08:39;  Status DC


Lidocaine HCl (Buffered Lidocaine 1%) 6 ml 1X  ONCE INJ  Last administered on 

4/2/20at 09:05;  Start 4/2/20 at 09:00;  Stop 4/2/20 at 09:06;  Status DC


Sodium Chloride 90 meq/Potassium Chloride 15 meq/ Potassium Phosphate 18 mmol/ 

Magnesium Sulfate 8 meq/Calcium Gluconate 15 meq/ Multivitamins 10 ml/Chromium/ 

Copper/Manganese/ Seleni/Zn 0.5 ml/ Insulin Human Regular 20 unit/ Total 

Parenteral Nutrition/Amino Acids/Dextrose/ Fat Emulsion Intravenous 1,400 ml @  

58.333 mls/ hr TPN  CONT IV  Last administered on 4/2/20at 22:45;  Start 4/2/20 

at 22:00;  Stop 4/3/20 at 21:59;  Status DC


Sodium Chloride 1,000 ml @  1,000 mls/hr Q1H PRN IV hypotension;  Start 4/3/20 

at 07:30;  Stop 4/3/20 at 13:29;  Status DC


Albumin Human 200 ml @  200 mls/hr 1X PRN  PRN IV Hypotension Last administered 

on 4/3/20at 09:36;  Start 4/3/20 at 07:30;  Stop 4/3/20 at 13:29;  Status DC


Sodium Chloride (Normal Saline Flush) 10 ml 1X PRN  PRN IV AP catheter pack;  

Start 4/3/20 at 07:30;  Stop 4/3/20 at 21:29;  Status DC


Sodium Chloride (Normal Saline Flush) 10 ml 1X PRN  PRN IV  catheter pack;  

Start 4/3/20 at 07:30;  Stop 4/4/20 at 07:29;  Status DC


Sodium Chloride 1,000 ml @  400 mls/hr Q2H30M PRN IV PATENCY;  Start 4/3/20 at 

07:30;  Stop 4/3/20 at 19:29;  Status DC


Info (PHARMACY MONITORING -- do not chart) 1 each PRN DAILY  PRN MC SEE 

COMMENTS;  Start 4/3/20 at 07:30;  Stop 4/3/20 at 13:02;  Status DC


Info (PHARMACY MONITORING -- do not chart) 1 each PRN DAILY  PRN MC SEE 

COMMENTS;  Start 4/3/20 at 07:30;  Stop 4/5/20 at 12:45;  Status DC


Sodium Chloride 90 meq/Potassium Chloride 15 meq/ Potassium Phosphate 10 mmol/ 

Magnesium Sulfate 8 meq/Calcium Gluconate 15 meq/ Multivitamins 10 ml/Chromium/ 

Copper/Manganese/ Seleni/Zn 0.5 ml/ Insulin Human Regular 25 unit/ Total 

Parenteral Nutrition/Amino Acids/Dextrose/ Fat Emulsion Intravenous 1,400 ml @  

58.333 mls/ hr TPN  CONT IV  Last administered on 4/3/20at 22:19;  Start 4/3/20 

at 22:00;  Stop 4/4/20 at 21:59;  Status DC


Heparin Sodium (Porcine) (Heparin Sodium) 5,000 unit Q12HR SQ  Last administered

on 4/26/20at 08:59;  Start 4/3/20 at 21:00;  Stop 4/26/20 at 10:05;  Status DC


Ondansetron HCl (Zofran) 4 mg PRN Q6HRS  PRN IV NAUSEA/VOMITING;  Start 4/6/20 

at 07:00;  Stop 4/7/20 at 06:59;  Status DC


Fentanyl Citrate (Fentanyl 2ml Vial) 25 mcg PRN Q5MIN  PRN IV MILD PAIN 1-3;  

Start 4/6/20 at 07:00;  Stop 4/7/20 at 06:59;  Status DC


Fentanyl Citrate (Fentanyl 2ml Vial) 50 mcg PRN Q5MIN  PRN IV MODERATE TO SEVERE

PAIN;  Start 4/6/20 at 07:00;  Stop 4/7/20 at 06:59;  Status DC


Ringer's Solution 1,000 ml @  30 mls/hr Q24H IV ;  Start 4/6/20 at 07:00;  Stop 

4/6/20 at 18:59;  Status DC


Lidocaine HCl (Xylocaine-Mpf 1% 2ml Vial) 2 ml PRN 1X  PRN ID PRIOR TO IV START;

 Start 4/6/20 at 07:00;  Stop 4/7/20 at 06:59;  Status DC


Prochlorperazine Edisylate (Compazine) 5 mg PACU PRN  PRN IV NAUSEA, MRX1;  

Start 4/6/20 at 07:00;  Stop 4/7/20 at 06:59;  Status DC


Sodium Chloride 1,000 ml @  1,000 mls/hr Q1H PRN IV hypotension;  Start 4/4/20 

at 09:10;  Stop 4/4/20 at 15:09;  Status DC


Albumin Human 200 ml @  200 mls/hr 1X PRN  PRN IV Hypotension Last administered 

on 4/4/20at 10:10;  Start 4/4/20 at 09:15;  Stop 4/4/20 at 15:14;  Status DC


Sodium Chloride 1,000 ml @  400 mls/hr Q2H30M PRN IV PATENCY;  Start 4/4/20 at 

09:10;  Stop 4/4/20 at 21:09;  Status DC


Info (PHARMACY MONITORING -- do not chart) 1 each PRN DAILY  PRN MC SEE 

COMMENTS;  Start 4/4/20 at 09:15;  Stop 4/5/20 at 12:45;  Status DC


Info (PHARMACY MONITORING -- do not chart) 1 each PRN DAILY  PRN MC SEE 

COMMENTS;  Start 4/4/20 at 09:15;  Stop 4/5/20 at 12:45;  Status DC


Sodium Chloride 90 meq/Potassium Chloride 15 meq/ Potassium Phosphate 10 mmol/ 

Magnesium Sulfate 8 meq/Calcium Gluconate 15 meq/ Multivitamins 10 ml/Chromium/ 

Copper/Manganese/ Seleni/Zn 0.5 ml/ Insulin Human Regular 25 unit/ Total 

Parenteral Nutrition/Amino Acids/Dextrose/ Fat Emulsion Intravenous 1,400 ml @  

58.333 mls/ hr TPN  CONT IV  Last administered on 4/4/20at 22:10;  Start 4/4/20 

at 22:00;  Stop 4/5/20 at 21:59;  Status DC


Magnesium Sulfate 50 ml @ 25 mls/hr PRN DAILY  PRN IV for Mag < 1.7 on am labs 

Last administered on 6/18/20at 10:57;  Start 4/5/20 at 09:15


Sodium Chloride 90 meq/Potassium Chloride 15 meq/ Potassium Phosphate 10 mmol/ 

Magnesium Sulfate 8 meq/Calcium Gluconate 15 meq/ Multivitamins 10 ml/Chromium/ 

Copper/Manganese/ Seleni/Zn 0.5 ml/ Insulin Human Regular 25 unit/ Total 

Parenteral Nutrition/Amino Acids/Dextrose/ Fat Emulsion Intravenous 1,400 ml @  

58.333 mls/ hr TPN  CONT IV  Last administered on 4/5/20at 21:20;  Start 4/5/20 

at 22:00;  Stop 4/6/20 at 21:59;  Status DC


Sodium Chloride 1,000 ml @  1,000 mls/hr Q1H PRN IV hypotension;  Start 4/5/20 

at 12:23;  Stop 4/5/20 at 18:22;  Status DC


Albumin Human 200 ml @  200 mls/hr 1X  ONCE IV  Last administered on 4/5/20at 

13:34;  Start 4/5/20 at 12:30;  Stop 4/5/20 at 13:29;  Status DC


Diphenhydramine HCl (Benadryl) 25 mg 1X PRN  PRN IV ITCHING;  Start 4/5/20 at 1

2:30;  Stop 4/6/20 at 12:29;  Status DC


Diphenhydramine HCl (Benadryl) 25 mg 1X PRN  PRN IV ITCHING;  Start 4/5/20 at 

12:30;  Stop 4/6/20 at 12:29;  Status DC


Info (PHARMACY MONITORING -- do not chart) 1 each PRN DAILY  PRN MC SEE 

COMMENTS;  Start 4/5/20 at 12:30;  Status Cancel


Bupivacaine HCl/ Epinephrine Bitart (Sensorcain-Epi 0.5%-1:173648 Mpf) 30 ml 

STK-MED ONCE .ROUTE  Last administered on 4/6/20at 11:44;  Start 4/6/20 at 

11:00;  Stop 4/6/20 at 11:01;  Status DC


Cellulose (Surgicel Fibrillar 1x2) 1 each STK-MED ONCE .ROUTE ;  Start 4/6/20 at

11:00;  Stop 4/6/20 at 11:01;  Status DC


Sodium Chloride 90 meq/Potassium Chloride 15 meq/ Potassium Phosphate 10 mmol/ 

Magnesium Sulfate 12 meq/Calcium Gluconate 15 meq/ Multivitamins 10 ml/Chromium/

Copper/Manganese/ Seleni/Zn 0.5 ml/ Insulin Human Regular 25 unit/ Total 

Parenteral Nutrition/Amino Acids/Dextrose/ Fat Emulsion Intravenous 1,400 ml @  

58.333 mls/ hr TPN  CONT IV  Last administered on 4/6/20at 22:24;  Start 4/6/20 

at 22:00;  Stop 4/7/20 at 21:59;  Status DC


Propofol 20 ml @ As Directed STK-MED ONCE IV ;  Start 4/6/20 at 11:07;  Stop 

4/6/20 at 11:07;  Status DC


Cellulose (Surgicel Hemostat 4x8) 1 each STK-MED ONCE .ROUTE  Last administered 

on 4/6/20at 11:44;  Start 4/6/20 at 11:55;  Stop 4/6/20 at 11:56;  Status DC


Sevoflurane (Ultane) 60 ml STK-MED ONCE IH ;  Start 4/6/20 at 12:46;  Stop 

4/6/20 at 12:46;  Status DC


Sodium Chloride 1,000 ml @  1,000 mls/hr Q1H PRN IV hypotension;  Start 4/6/20 

at 13:51;  Stop 4/6/20 at 19:50;  Status DC


Albumin Human 200 ml @  200 mls/hr 1X PRN  PRN IV Hypotension Last administered 

on 4/6/20at 14:51;  Start 4/6/20 at 14:00;  Stop 4/6/20 at 19:59;  Status DC


Diphenhydramine HCl (Benadryl) 25 mg 1X PRN  PRN IV ITCHING;  Start 4/6/20 at 

14:00;  Stop 4/7/20 at 13:59;  Status DC


Diphenhydramine HCl (Benadryl) 25 mg 1X PRN  PRN IV ITCHING;  Start 4/6/20 at 

14:00;  Stop 4/7/20 at 13:59;  Status DC


Sodium Chloride 1,000 ml @  400 mls/hr Q2H30M PRN IV PATENCY;  Start 4/6/20 at 

13:51;  Stop 4/7/20 at 01:50;  Status DC


Info (PHARMACY MONITORING -- do not chart) 1 each PRN DAILY  PRN MC SEE 

COMMENTS;  Start 4/6/20 at 14:00;  Stop 4/9/20 at 08:16;  Status DC


Heparin Sodium (Porcine) (Hep Lock Adult) 500 unit STK-MED ONCE IVP ;  Start 

4/7/20 at 09:29;  Stop 4/7/20 at 09:30;  Status DC


Sodium Chloride 1,000 ml @  1,000 mls/hr Q1H PRN IV hypotension;  Start 4/7/20 

at 10:43;  Stop 4/7/20 at 16:42;  Status DC


Sodium Chloride 1,000 ml @  400 mls/hr Q2H30M PRN IV PATENCY;  Start 4/7/20 at 

10:43;  Stop 4/7/20 at 22:42;  Status DC


Info (PHARMACY MONITORING -- do not chart) 1 each PRN DAILY  PRN MC SEE 

COMMENTS;  Start 4/7/20 at 10:45;  Status UNV


Info (PHARMACY MONITORING -- do not chart) 1 each PRN DAILY  PRN MC SEE 

COMMENTS;  Start 4/7/20 at 10:45;  Status UNV


Sodium Chloride 90 meq/Potassium Chloride 15 meq/ Magnesium Sulfate 12 

meq/Calcium Gluconate 15 meq/ Multivitamins 10 ml/Chromium/ Copper/Manganese/ 

Seleni/Zn 0.5 ml/ Insulin Human Regular 25 unit/ Total Parenteral 

Nutrition/Amino Acids/Dextrose/ Fat Emulsion Intravenous 1,400 ml @  58.333 mls/

hr TPN  CONT IV  Last administered on 4/7/20at 22:13;  Start 4/7/20 at 22:00;  

Stop 4/8/20 at 21:59;  Status DC


Sodium Chloride 1,000 ml @  1,000 mls/hr Q1H PRN IV hypotension;  Start 4/8/20 

at 07:50;  Stop 4/8/20 at 13:49;  Status DC


Albumin Human 200 ml @  200 mls/hr 1X  ONCE IV ;  Start 4/8/20 at 08:00;  Stop 

4/8/20 at 08:53;  Status DC


Diphenhydramine HCl (Benadryl) 25 mg 1X PRN  PRN IV ITCHING;  Start 4/8/20 at 

08:00;  Stop 4/9/20 at 07:59;  Status DC


Diphenhydramine HCl (Benadryl) 25 mg 1X PRN  PRN IV ITCHING;  Start 4/8/20 at 

08:00;  Stop 4/9/20 at 07:59;  Status DC


Info (PHARMACY MONITORING -- do not chart) 1 each PRN DAILY  PRN MC SEE 

COMMENTS;  Start 4/8/20 at 08:00;  Stop 4/9/20 at 08:16;  Status DC


Albumin Human 50 ml @ 50 mls/hr 1X  ONCE IV ;  Start 4/8/20 at 08:53;  Stop 

4/8/20 at 08:56;  Status DC


Albumin Human 200 ml @  50 mls/hr PRN 1X  PRN IV HYPOTENSION Last administered 

on 4/14/20at 11:54;  Start 4/8/20 at 09:00;  Stop 5/21/20 at 11:14;  Status DC


Meropenem 500 mg/ Sodium Chloride 50 ml @  100 mls/hr Q12H IV  Last administered

on 4/28/20at 10:45;  Start 4/8/20 at 10:00;  Stop 4/28/20 at 12:37;  Status DC


Sodium Chloride 90 meq/Magnesium Sulfate 12 meq/ Calcium Gluconate 15 meq/ 

Multivitamins 10 ml/Chromium/ Copper/Manganese/ Seleni/Zn 0.5 ml/ Insulin Human 

Regular 25 unit/ Total Parenteral Nutrition/Amino Acids/Dextrose/ Fat Emulsion 

Intravenous 1,400 ml @  58.333 mls/ hr TPN  CONT IV  Last administered on 

4/8/20at 21:41;  Start 4/8/20 at 22:00;  Stop 4/9/20 at 21:59;  Status DC


Sodium Chloride 1,000 ml @  1,000 mls/hr Q1H PRN IV hypotension;  Start 4/9/20 

at 07:58;  Stop 4/9/20 at 13:57;  Status DC


Albumin Human 200 ml @  200 mls/hr 1X PRN  PRN IV Hypotension Last administered 

on 4/9/20at 09:30;  Start 4/9/20 at 08:00;  Stop 4/9/20 at 13:59;  Status DC


Sodium Chloride 1,000 ml @  400 mls/hr Q2H30M PRN IV PATENCY;  Start 4/9/20 at 

07:58;  Stop 4/9/20 at 19:57;  Status DC


Info (PHARMACY MONITORING -- do not chart) 1 each PRN DAILY  PRN MC SEE 

COMMENTS;  Start 4/9/20 at 08:00;  Status Cancel


Info (PHARMACY MONITORING -- do not chart) 1 each PRN DAILY  PRN MC SEE 

COMMENTS;  Start 4/9/20 at 08:15;  Status UNV


Sodium Chloride 90 meq/Potassium Phosphate 5 mmol/ Magnesium Sulfate 12 

meq/Calcium Gluconate 15 meq/ Multivitamins 10 ml/Chromium/ Copper/Manganese/ 

Seleni/Zn 0.5 ml/ Insulin Human Regular 30 unit/ Total Parenteral 

Nutrition/Amino Acids/Dextrose/ Fat Emulsion Intravenous 1,400 ml @  58.333 mls/

hr TPN  CONT IV  Last administered on 4/9/20at 22:08;  Start 4/9/20 at 22:00;  

Stop 4/10/20 at 21:59;  Status DC


Linezolid/Dextrose 300 ml @  300 mls/hr Q12HR IV  Last administered on 4/20/20at

20:40;  Start 4/10/20 at 11:00;  Stop 4/21/20 at 08:10;  Status DC


Sodium Chloride 90 meq/Potassium Phosphate 15 mmol/ Magnesium Sulfate 12 

meq/Calcium Gluconate 15 meq/ Multivitamins 10 ml/Chromium/ Copper/Manganese/ 

Seleni/Zn 0.5 ml/ Insulin Human Regular 30 unit/ Total Parenteral 

Nutrition/Amino Acids/Dextrose/ Fat Emulsion Intravenous 1,400 ml @  58.333 mls/

hr TPN  CONT IV  Last administered on 4/10/20at 21:49;  Start 4/10/20 at 22:00; 

Stop 4/11/20 at 21:59;  Status DC


Sodium Chloride 90 meq/Potassium Phosphate 15 mmol/ Magnesium Sulfate 12 

meq/Calcium Gluconate 15 meq/ Multivitamins 10 ml/Chromium/ Copper/Manganese/ 

Seleni/Zn 0.5 ml/ Insulin Human Regular 40 unit/ Total Parenteral 

Nutrition/Amino Acids/Dextrose/ Fat Emulsion Intravenous 1,400 ml @  58.333 mls/

hr TPN  CONT IV  Last administered on 4/11/20at 21:21;  Start 4/11/20 at 22:00; 

Stop 4/12/20 at 21:59;  Status DC


Sodium Chloride 1,000 ml @  1,000 mls/hr Q1H PRN IV hypotension;  Start 4/11/20 

at 13:26;  Stop 4/11/20 at 19:25;  Status DC


Albumin Human 200 ml @  200 mls/hr 1X PRN  PRN IV Hypotension Last administered 

on 4/11/20at 15:00;  Start 4/11/20 at 13:30;  Stop 4/11/20 at 19:29;  Status DC


Sodium Chloride (Normal Saline Flush) 10 ml 1X PRN  PRN IV AP catheter pack;  

Start 4/11/20 at 13:30;  Stop 4/12/20 at 13:29;  Status DC


Sodium Chloride (Normal Saline Flush) 10 ml 1X PRN  PRN IV  catheter pack;  

Start 4/11/20 at 13:30;  Stop 4/12/20 at 13:29;  Status DC


Sodium Chloride 1,000 ml @  400 mls/hr Q2H30M PRN IV PATENCY;  Start 4/11/20 at 

13:26;  Stop 4/12/20 at 01:25;  Status DC


Info (PHARMACY MONITORING -- do not chart) 1 each PRN DAILY  PRN MC SEE 

COMMENTS;  Start 4/11/20 at 13:30;  Stop 4/11/20 at 13:33;  Status DC


Info (PHARMACY MONITORING -- do not chart) 1 each PRN DAILY  PRN MC SEE 

COMMENTS;  Start 4/11/20 at 13:30;  Stop 4/11/20 at 13:34;  Status DC


Sodium Chloride 90 meq/Potassium Phosphate 19 mmol/ Magnesium Sulfate 12 

meq/Calcium Gluconate 15 meq/ Multivitamins 10 ml/Chromium/ Copper/Manganese/ 

Seleni/Zn 0.5 ml/ Insulin Human Regular 40 unit/ Total Parenteral N

utrition/Amino Acids/Dextrose/ Fat Emulsion Intravenous 1,400 ml @  58.333 mls/ 

hr TPN  CONT IV  Last administered on 4/12/20at 21:54;  Start 4/12/20 at 22:00; 

Stop 4/13/20 at 21:59;  Status DC


Sodium Chloride 1,000 ml @  1,000 mls/hr Q1H PRN IV hypotension;  Start 4/13/20 

at 09:35;  Stop 4/13/20 at 15:34;  Status DC


Albumin Human 200 ml @  200 mls/hr 1X PRN  PRN IV Hypotension;  Start 4/13/20 at

09:45;  Stop 4/13/20 at 15:44;  Status DC


Diphenhydramine HCl (Benadryl) 25 mg 1X PRN  PRN IV ITCHING;  Start 4/13/20 at 

09:45;  Stop 4/14/20 at 09:44;  Status DC


Diphenhydramine HCl (Benadryl) 25 mg 1X PRN  PRN IV ITCHING;  Start 4/13/20 at 

09:45;  Stop 4/14/20 at 09:44;  Status DC


Sodium Chloride 1,000 ml @  400 mls/hr Q2H30M PRN IV PATENCY;  Start 4/13/20 at 

09:35;  Stop 4/13/20 at 21:34;  Status DC


Info (PHARMACY MONITORING -- do not chart) 1 each PRN DAILY  PRN MC SEE 

COMMENTS;  Start 4/13/20 at 09:45;  Status Cancel


Sodium Chloride 100 meq/Potassium Phosphate 19 mmol/ Magnesium Sulfate 12 

meq/Calcium Gluconate 15 meq/ Multivitamins 10 ml/Chromium/ Copper/Manganese/ 

Seleni/Zn 0.5 ml/ Insulin Human Regular 40 unit/ Potassium Chloride 20 meq/ 

Total Parenteral Nutrition/Amino Acids/Dextrose/ Fat Emulsion Intravenous 1,400 

ml @  58.333 mls/ hr TPN  CONT IV  Last administered on 4/13/20at 22:02;  Start 

4/13/20 at 22:00;  Stop 4/14/20 at 21:59;  Status DC


Furosemide (Lasix) 40 mg 1X  ONCE IVP  Last administered on 4/13/20at 14:39;  

Start 4/13/20 at 14:30;  Stop 4/13/20 at 14:31;  Status DC


Metronidazole 100 ml @  100 mls/hr Q8HRS IV  Last administered on 4/21/20at 

06:04;  Start 4/14/20 at 10:00;  Stop 4/21/20 at 08:10;  Status DC


Sodium Chloride 1,000 ml @  1,000 mls/hr Q1H PRN IV hypotension;  Start 4/14/20 

at 08:00;  Stop 4/14/20 at 13:59;  Status DC


Albumin Human 200 ml @  200 mls/hr 1X PRN  PRN IV Hypotension;  Start 4/14/20 at

08:00;  Stop 4/14/20 at 13:59;  Status DC


Sodium Chloride 1,000 ml @  400 mls/hr Q2H30M PRN IV PATENCY;  Start 4/14/20 at 

08:00;  Stop 4/14/20 at 19:59;  Status DC


Info (PHARMACY MONITORING -- do not chart) 1 each PRN DAILY  PRN MC SEE 

COMMENTS;  Start 4/14/20 at 11:30;  Status UNV


Info (PHARMACY MONITORING -- do not chart) 1 each PRN DAILY  PRN MC SEE 

COMMENTS;  Start 4/14/20 at 11:30;  Stop 4/16/20 at 12:13;  Status DC


Sodium Chloride 100 meq/Potassium Phosphate 19 mmol/ Magnesium Sulfate 12 

meq/Calcium Gluconate 15 meq/ Multivitamins 10 ml/Chromium/ Copper/Manganese/ 

Seleni/Zn 0.5 ml/ Insulin Human Regular 40 unit/ Potassium Chloride 20 meq/ 

Total Parenteral Nutrition/Amino Acids/Dextrose/ Fat Emulsion Intravenous 1,400 

ml @  58.333 mls/ hr TPN  CONT IV  Last administered on 4/14/20at 21:52;  Start 

4/14/20 at 22:00;  Stop 4/15/20 at 21:59;  Status DC


Sodium Chloride (Normal Saline Flush) 10 ml QSHIFT  PRN IV AFTER MEDS AND BLOOD 

DRAWS;  Start 4/14/20 at 15:00;  Stop 5/12/20 at 11:27;  Status DC


Sodium Chloride (Normal Saline Flush) 10 ml PRN Q5MIN  PRN IV AFTER MEDS AND 

BLOOD DRAWS;  Start 4/14/20 at 15:00


Sodium Chloride (Normal Saline Flush) 20 ml PRN Q5MIN  PRN IV AFTER MEDS AND 

BLOOD DRAWS;  Start 4/14/20 at 15:00


Sodium Chloride 100 meq/Potassium Phosphate 19 mmol/ Magnesium Sulfate 12 

meq/Calcium Gluconate 15 meq/ Multivitamins 10 ml/Chromium/ Copper/Manganese/ 

Seleni/Zn 0.5 ml/ Insulin Human Regular 40 unit/ Potassium Chloride 20 meq/ Tot

al Parenteral Nutrition/Amino Acids/Dextrose/ Fat Emulsion Intravenous 1,400 ml 

@  58.333 mls/ hr TPN  CONT IV  Last administered on 4/15/20at 21:20;  Start 

4/15/20 at 22:00;  Stop 4/16/20 at 21:59;  Status DC


Lidocaine HCl (Buffered Lidocaine 1%) 3 ml STK-MED ONCE .ROUTE ;  Start 4/15/20 

at 13:16;  Stop 4/15/20 at 13:16;  Status DC


Lidocaine HCl (Buffered Lidocaine 1%) 6 ml 1X  ONCE INJ  Last administered on 

4/15/20at 13:45;  Start 4/15/20 at 13:30;  Stop 4/15/20 at 13:31;  Status DC


Albumin Human 100 ml @  100 mls/hr 1X  ONCE IV  Last administered on 4/15/20at 

15:41;  Start 4/15/20 at 15:00;  Stop 4/15/20 at 15:59;  Status DC


Albumin Human 50 ml @ 50 mls/hr 1X  ONCE IV  Last administered on 4/15/20at 

15:00;  Start 4/15/20 at 15:00;  Stop 4/15/20 at 15:59;  Status DC


Info (PHARMACY MONITORING -- do not chart) 1 each PRN DAILY  PRN MC SEE 

COMMENTS;  Start 4/16/20 at 11:30;  Status Cancel


Info (PHARMACY MONITORING -- do not chart) 1 each PRN DAILY  PRN MC SEE 

COMMENTS;  Start 4/16/20 at 11:30;  Status UNV


Sodium Chloride 100 meq/Potassium Phosphate 10 mmol/ Magnesium Sulfate 12 

meq/Calcium Gluconate 15 meq/ Multivitamins 10 ml/Chromium/ Copper/Manganese/ 

Seleni/Zn 0.5 ml/ Insulin Human Regular 35 unit/ Potassium Chloride 20 meq/ 

Total Parenteral Nutrition/Amino Acids/Dextrose/ Fat Emulsion Intravenous 1,400 

ml @  58.333 mls/ hr TPN  CONT IV  Last administered on 4/16/20at 22:10;  Start 

4/16/20 at 22:00;  Stop 4/17/20 at 21:59;  Status DC


Sodium Chloride 100 meq/Potassium Phosphate 5 mmol/ Magnesium Sulfate 12 me

q/Calcium Gluconate 15 meq/ Multivitamins 10 ml/Chromium/ Copper/Manganese/ 

Seleni/Zn 0.5 ml/ Insulin Human Regular 35 unit/ Potassium Chloride 20 meq/ 

Total Parenteral Nutrition/Amino Acids/Dextrose/ Fat Emulsion Intravenous 1,400 

ml @  58.333 mls/ hr TPN  CONT IV  Last administered on 4/17/20at 22:59;  Start 

4/17/20 at 22:00;  Stop 4/18/20 at 21:59;  Status DC


Sodium Chloride 1,000 ml @  1,000 mls/hr Q1H PRN IV hypotension;  Start 4/18/20 

at 08:27;  Stop 4/18/20 at 14:26;  Status DC


Albumin Human 200 ml @  200 mls/hr 1X PRN  PRN IV Hypotension Last administered 

on 4/18/20at 09:18;  Start 4/18/20 at 08:30;  Stop 4/18/20 at 14:29;  Status DC


Sodium Chloride 1,000 ml @  400 mls/hr Q2H30M PRN IV PATENCY;  Start 4/18/20 at 

08:27;  Stop 4/18/20 at 20:26;  Status DC


Info (PHARMACY MONITORING -- do not chart) 1 each PRN DAILY  PRN MC SEE 

COMMENTS;  Start 4/18/20 at 08:30;  Status Cancel


Info (PHARMACY MONITORING -- do not chart) 1 each PRN DAILY  PRN MC SEE 

COMMENTS;  Start 4/18/20 at 08:30;  Stop 4/26/20 at 13:10;  Status DC


Sodium Chloride 100 meq/Potassium Chloride 40 meq/ Magnesium Sulfate 15 

meq/Calcium Gluconate 15 meq/ Multivitamins 10 ml/Chromium/ Copper/Manganese/ 

Seleni/Zn 0.5 ml/ Insulin Human Regular 35 unit/ Total Parenteral 

Nutrition/Amino Acids/Dextrose/ Fat Emulsion Intravenous 1,400 ml @  58.333 mls/

hr TPN  CONT IV  Last administered on 4/18/20at 22:00;  Start 4/18/20 at 22:00; 

Stop 4/19/20 at 21:59;  Status DC


Potassium Chloride/Water 100 ml @  100 mls/hr 1X  ONCE IV  Last administered on 

4/18/20at 17:28;  Start 4/18/20 at 14:45;  Stop 4/18/20 at 15:44;  Status DC


Sodium Chloride 100 meq/Potassium Chloride 40 meq/ Magnesium Sulfate 15 

meq/Calcium Gluconate 15 meq/ Multivitamins 10 ml/Chromium/ Copper/Manganese/ 

Seleni/Zn 0.5 ml/ Insulin Human Regular 35 unit/ Total Parenteral 

Nutrition/Amino Acids/Dextrose/ Fat Emulsion Intravenous 1,400 ml @  58.333 mls/

hr TPN  CONT IV  Last administered on 4/19/20at 22:46;  Start 4/19/20 at 22:00; 

Stop 4/20/20 at 21:59;  Status DC


Sodium Chloride 100 meq/Potassium Chloride 40 meq/ Magnesium Sulfate 20 

meq/Calcium Gluconate 15 meq/ Multivitamins 10 ml/Chromium/ Copper/Manganese/ 

Seleni/Zn 0.5 ml/ Insulin Human Regular 35 unit/ Total Parenteral 

Nutrition/Amino Acids/Dextrose/ Fat Emulsion Intravenous 1,400 ml @  58.333 mls/

hr TPN  CONT IV  Last administered on 4/20/20at 22:31;  Start 4/20/20 at 22:00; 

Stop 4/21/20 at 21:59;  Status DC


Fentanyl Citrate (Fentanyl 2ml Vial) 50 mcg PRN Q2HR  PRN IVP PAIN Last 

administered on 4/27/20at 13:32;  Start 4/20/20 at 21:00;  Stop 4/28/20 at 

12:53;  Status DC


Fentanyl Citrate (Fentanyl 2ml Vial) 25 mcg PRN Q2HR  PRN IVP PAIN;  Start 

4/20/20 at 21:00;  Stop 4/28/20 at 12:54;  Status DC


Enoxaparin Sodium (Lovenox 100mg Syringe) 100 mg Q12HR SQ ;  Start 4/21/20 at 

21:00;  Status UNV


Amino Acids/ Glycerin/ Electrolytes 1,000 ml @  75 mls/hr [id number] IV ;  Start 

4/20/20 at 21:15;  Status UNV


Sodium Chloride 1,000 ml @  1,000 mls/hr Q1H PRN IV hypotension;  Start 4/21/20 

at 07:56;  Stop 4/21/20 at 13:55;  Status DC


Albumin Human 200 ml @  200 mls/hr 1X PRN  PRN IV Hypotension Last administered 

on 4/21/20at 08:40;  Start 4/21/20 at 08:00;  Stop 4/21/20 at 13:59;  Status DC


Sodium Chloride 1,000 ml @  400 mls/hr Q2H30M PRN IV PATENCY;  Start 4/21/20 at 

07:56;  Stop 4/21/20 at 19:55;  Status DC


Info (PHARMACY MONITORING -- do not chart) 1 each PRN DAILY  PRN MC SEE 

COMMENTS;  Start 4/21/20 at 08:00;  Status UNV


Info (PHARMACY MONITORING -- do not chart) 1 each PRN DAILY  PRN MC SEE 

COMMENTS;  Start 4/21/20 at 08:00;  Status UNV


Daptomycin 430 mg/ Sodium Chloride 50 ml @  100 mls/hr Q24H IV  Last 

administered on 4/21/20at 12:35;  Start 4/21/20 at 09:00;  Stop 4/21/20 at 

12:49;  Status DC


Sodium Chloride 100 meq/Potassium Chloride 40 meq/ Magnesium Sulfate 20 

meq/Calcium Gluconate 15 meq/ Multivitamins 10 ml/Chromium/ Copper/Manganese/ 

Seleni/Zn 0.5 ml/ Insulin Human Regular 35 unit/ Total Parenteral 

Nutrition/Amino Acids/Dextrose/ Fat Emulsion Intravenous 1,400 ml @  58.333 mls/

hr TPN  CONT IV  Last administered on 4/21/20at 21:26;  Start 4/21/20 at 22:00; 

Stop 4/22/20 at 21:59;  Status DC


Daptomycin 430 mg/ Sodium Chloride 50 ml @  100 mls/hr Q48H IV ;  Start 4/23/20 

at 09:00;  Stop 4/22/20 at 11:55;  Status DC


Sodium Chloride 100 meq/Potassium Chloride 40 meq/ Magnesium Sulfate 20 

meq/Calcium Gluconate 15 meq/ Multivitamins 10 ml/Chromium/ Copper/Manganese/ 

Seleni/Zn 0.5 ml/ Insulin Human Regular 35 unit/ Total Parenteral 

Nutrition/Amino Acids/Dextrose/ Fat Emulsion Intravenous 1,400 ml @  58.333 mls/

hr TPN  CONT IV  Last administered on 4/22/20at 22:27;  Start 4/22/20 at 22:00; 

Stop 4/23/20 at 21:59;  Status DC


Daptomycin 430 mg/ Sodium Chloride 50 ml @  100 mls/hr Q24H IV  Last 

administered on 4/24/20at 15:07;  Start 4/22/20 at 13:00;  Stop 4/25/20 at 

13:15;  Status DC


Sodium Chloride 100 meq/Potassium Chloride 40 meq/ Magnesium Sulfate 20 

meq/Calcium Gluconate 10 meq/ Multivitamins 10 ml/Chromium/ Copper/Manganese/ 

Seleni/Zn 0.5 ml/ Insulin Human Regular 35 unit/ Total Parenteral 

Nutrition/Amino Acids/Dextrose/ Fat Emulsion Intravenous 1,400 ml @  58.333 mls/

hr TPN  CONT IV  Last administered on 4/24/20at 00:06;  Start 4/23/20 at 22:00; 

Stop 4/24/20 at 21:59;  Status DC


Alteplase, Recombinant (Cathflo For Central Catheter Clearance) 1 mg 1X  ONCE 

INT CAT  Last administered on 4/24/20at 11:44;  Start 4/24/20 at 10:45;  Stop 

4/24/20 at 10:46;  Status DC


Ondansetron HCl (Zofran) 4 mg PRN Q6HRS  PRN IV NAUSEA/VOMITING;  Start 4/27/20 

at 07:00;  Stop 4/28/20 at 06:59;  Status DC


Fentanyl Citrate (Fentanyl 2ml Vial) 25 mcg PRN Q5MIN  PRN IV MILD PAIN 1-3;  

Start 4/27/20 at 07:00;  Stop 4/28/20 at 06:59;  Status DC


Fentanyl Citrate (Fentanyl 2ml Vial) 50 mcg PRN Q5MIN  PRN IV MODERATE TO SEVERE

PAIN Last administered on 4/27/20at 10:17;  Start 4/27/20 at 07:00;  Stop 

4/28/20 at 06:59;  Status DC


Ringer's Solution 1,000 ml @  30 mls/hr Q24H IV ;  Start 4/27/20 at 07:00;  Stop

4/27/20 at 18:59;  Status DC


Lidocaine HCl (Xylocaine-Mpf 1% 2ml Vial) 2 ml PRN 1X  PRN ID PRIOR TO IV START;

 Start 4/27/20 at 07:00;  Stop 4/28/20 at 06:59;  Status DC


Prochlorperazine Edisylate (Compazine) 5 mg PACU PRN  PRN IV NAUSEA, MRX1;  

Start 4/27/20 at 07:00;  Stop 4/28/20 at 06:59;  Status DC


Sodium Acetate 50 meq/Potassium Acetate 55 meq/ Magnesium Sulfate 20 meq/Calcium

Gluconate 10 meq/ Multivitamins 10 ml/Chromium/ Copper/Manganese/ Seleni/Zn 0.5 

ml/ Insulin Human Regular 35 unit/ Total Parenteral Nutrition/Amino 

Acids/Dextrose/ Fat Emulsion Intravenous 1,400 ml @  58.333 mls/ hr TPN  CONT IV

;  Start 4/24/20 at 22:00;  Stop 4/24/20 at 14:15;  Status DC


Sodium Acetate 50 meq/Potassium Acetate 55 meq/ Magnesium Sulfate 20 meq/Calcium

Gluconate 10 meq/ Multivitamins 10 ml/Chromium/ Copper/Manganese/ Seleni/Zn 0.5 

ml/ Insulin Human Regular 35 unit/ Total Parenteral Nutrition/Amino 

Acids/Dextrose/ Fat Emulsion Intravenous 1,800 ml @  75 mls/hr TPN  CONT IV  

Last administered on 4/24/20at 22:38;  Start 4/24/20 at 22:00;  Stop 4/25/20 at 

21:59;  Status DC


Sodium Chloride 1,000 ml @  1,000 mls/hr Q1H PRN IV hypotension;  Start 4/24/20 

at 15:31;  Stop 4/24/20 at 21:30;  Status DC


Diphenhydramine HCl (Benadryl) 25 mg 1X PRN  PRN IV ITCHING;  Start 4/24/20 at 

15:45;  Stop 4/25/20 at 15:44;  Status DC


Diphenhydramine HCl (Benadryl) 25 mg 1X PRN  PRN IV ITCHING;  Start 4/24/20 at 

15:45;  Stop 4/25/20 at 15:44;  Status DC


Sodium Chloride 1,000 ml @  400 mls/hr Q2H30M PRN IV PATENCY;  Start 4/24/20 at 

15:31;  Stop 4/25/20 at 03:30;  Status DC


Info (PHARMACY MONITORING -- do not chart) 1 each PRN DAILY  PRN MC SEE 

COMMENTS;  Start 4/24/20 at 15:45;  Stop 5/26/20 at 14:14;  Status DC


Sodium Acetate 50 meq/Potassium Acetate 55 meq/ Magnesium Sulfate 20 meq/Calcium

Gluconate 10 meq/ Multivitamins 10 ml/Chromium/ Copper/Manganese/ Seleni/Zn 0.5 

ml/ Insulin Human Regular 35 unit/ Total Parenteral Nutrition/Amino 

Acids/Dextrose/ Fat Emulsion Intravenous 1,800 ml @  75 mls/hr TPN  CONT IV  

Last administered on 4/25/20at 22:03;  Start 4/25/20 at 22:00;  Stop 4/26/20 at 

21:59;  Status DC


Daptomycin 430 mg/ Sodium Chloride 50 ml @  100 mls/hr Q24H IV  Last 

administered on 4/30/20at 13:00;  Start 4/25/20 at 13:00;  Stop 4/30/20 at 

20:58;  Status DC


Heparin Sodium (Porcine) 1000 unit/Sodium Chloride 1,001 ml @  1,001 mls/hr 1X  

ONCE IRR ;  Start 4/27/20 at 06:00;  Stop 4/27/20 at 06:59;  Status DC


Potassium Acetate 55 meq/Magnesium Sulfate 20 meq/ Calcium Gluconate 10 meq/ 

Multivitamins 10 ml/Chromium/ Copper/Manganese/ Seleni/Zn 0.5 ml/ Insulin Human 

Regular 35 unit/ Total Parenteral Nutrition/Amino Acids/Dextrose/ Fat Emulsion 

Intravenous 1,920 ml @  80 mls/hr TPN  CONT IV  Last administered on 4/26/20at 

22:10;  Start 4/26/20 at 22:00;  Stop 4/27/20 at 21:59;  Status DC


Dexamethasone Sodium Phosphate (Decadron) 4 mg STK-MED ONCE .ROUTE ;  Start 

4/27/20 at 10:56;  Stop 4/27/20 at 10:57;  Status DC


Ondansetron HCl (Zofran) 4 mg STK-MED ONCE .ROUTE ;  Start 4/27/20 at 10:56;  

Stop 4/27/20 at 10:57;  Status DC


Rocuronium Bromide (Zemuron) 50 mg STK-MED ONCE .ROUTE ;  Start 4/27/20 at 

10:56;  Stop 4/27/20 at 10:57;  Status DC


Fentanyl Citrate (Fentanyl 2ml Vial) 100 mcg STK-MED ONCE .ROUTE ;  Start 4/27 /20 at 10:56;  Stop 4/27/20 at 10:57;  Status DC


Bupivacaine HCl/ Epinephrine Bitart (Sensorcain-Epi 0.5%-1:490350 Mpf) 30 ml ST

K-MED ONCE .ROUTE  Last administered on 4/27/20at 12:01;  Start 4/27/20 at 

10:58;  Stop 4/27/20 at 10:58;  Status DC


Cellulose (Surgicel Hemostat 2x14) 1 each STK-MED ONCE .ROUTE ;  Start 4/27/20 

at 10:58;  Stop 4/27/20 at 10:59;  Status DC


Iohexol (Omnipaque 300 Mg/ml) 50 ml STK-MED ONCE .ROUTE ;  Start 4/27/20 at 

10:58;  Stop 4/27/20 at 10:59;  Status DC


Cellulose (Surgicel Hemostat 4x8) 1 each STK-MED ONCE .ROUTE ;  Start 4/27/20 at

10:58;  Stop 4/27/20 at 10:59;  Status DC


Bisacodyl (Dulcolax Supp) 10 mg STK-MED ONCE .ROUTE ;  Start 4/27/20 at 10:59;  

Stop 4/27/20 at 10:59;  Status DC


Heparin Sodium (Porcine) 1000 unit/Sodium Chloride 1,001 ml @  1,001 mls/hr 1X  

ONCE IRR ;  Start 4/27/20 at 12:00;  Stop 4/27/20 at 12:59;  Status DC


Propofol 20 ml @ As Directed STK-MED ONCE IV ;  Start 4/27/20 at 11:05;  Stop 

4/27/20 at 11:05;  Status DC


Sevoflurane (Ultane) 90 ml STK-MED ONCE IH ;  Start 4/27/20 at 11:05;  Stop 

4/27/20 at 11:05;  Status DC


Sevoflurane (Ultane) 60 ml STK-MED ONCE IH ;  Start 4/27/20 at 12:26;  Stop 

4/27/20 at 12:27;  Status DC


Propofol 20 ml @ As Directed STK-MED ONCE IV ;  Start 4/27/20 at 12:26;  Stop 

4/27/20 at 12:27;  Status DC


Phenylephrine HCl (PHENYLEPHRINE in 0.9% NACL PF) 1 mg STK-MED ONCE IV ;  Start 

4/27/20 at 12:34;  Stop 4/27/20 at 12:34;  Status DC


Heparin Sodium (Porcine) (Heparin Sodium) 5,000 unit Q12HR SQ  Last administered

on 5/6/20at 20:57;  Start 4/27/20 at 21:00;  Stop 5/7/20 at 09:59;  Status DC


Sodium Chloride (Normal Saline Flush) 3 ml QSHIFT  PRN IV AFTER MEDS AND BLOOD 

DRAWS;  Start 4/27/20 at 13:45


Naloxone HCl (Narcan) 0.4 mg PRN Q2MIN  PRN IV SEE INSTRUCTIONS Last 

administered on 6/6/20at 15:15;  Start 4/27/20 at 13:45


Sodium Chloride 1,000 ml @  25 mls/hr Q24H IV  Last administered on 5/26/20at 

13:37;  Start 4/27/20 at 13:37;  Stop 5/29/20 at 13:09;  Status DC


Naloxone HCl (Narcan) 0.4 mg PRN Q2MIN  PRN IV SEE INSTRUCTIONS;  Start 4/27/20 

at 14:30;  Status UNV


Sodium Chloride 1,000 ml @  25 mls/hr Q24H IV ;  Start 4/27/20 at 14:30;  Status

UNV


Hydromorphone HCl 30 ml @ 0 mls/hr CONT PRN  PRN IV PER PROTOCOL Last 

administered on 5/2/20at 16:08;  Start 4/27/20 at 14:30;  Stop 5/4/20 at 08:55; 

Status DC


Potassium Acetate 55 meq/Magnesium Sulfate 20 meq/ Calcium Gluconate 10 meq/ 

Multivitamins 10 ml/Chromium/ Copper/Manganese/ Seleni/Zn 0.5 ml/ Insulin Human 

Regular 35 unit/ Total Parenteral Nutrition/Amino Acids/Dextrose/ Fat Emulsion 

Intravenous 1,920 ml @  80 mls/hr TPN  CONT IV  Last administered on 4/27/20at 

22:01;  Start 4/27/20 at 22:00;  Stop 4/28/20 at 21:59;  Status DC


Bumetanide (Bumex) 2 mg BID92 IV  Last administered on 5/1/20at 13:50;  Start 

4/28/20 at 14:00;  Stop 5/2/20 at 14:10;  Status DC


Meropenem 1 gm/ Sodium Chloride 100 ml @  200 mls/hr Q8HRS IV  Last administered

on 5/22/20at 05:53;  Start 4/28/20 at 14:00;  Stop 5/22/20 at 09:31;  Status DC


Potassium Acetate 55 meq/Magnesium Sulfate 20 meq/ Calcium Gluconate 10 meq/ Mul

tivitamins 10 ml/Chromium/ Copper/Manganese/ Seleni/Zn 0.5 ml/ Insulin Human 

Regular 35 unit/ Total Parenteral Nutrition/Amino Acids/Dextrose/ Fat Emulsion 

Intravenous 1,920 ml @  80 mls/hr TPN  CONT IV  Last administered on 4/28/20at 

22:02;  Start 4/28/20 at 22:00;  Stop 4/29/20 at 21:59;  Status DC


Hydromorphone HCl (Dilaudid Standard PCA) 12 mg STK-MED ONCE IV ;  Start 4/27/20

at 14:35;  Stop 4/28/20 at 13:53;  Status DC


Artificial Tears (Artificial Tears) 1 drop PRN Q15MIN  PRN OU DRY EYE Last 

administered on 6/23/20at 21:17;  Start 4/29/20 at 05:30


Hydromorphone HCl (Dilaudid Standard PCA) 12 mg STK-MED ONCE IV ;  Start 4/28/20

at 12:05;  Stop 4/29/20 at 09:15;  Status DC


Potassium Acetate 65 meq/Magnesium Sulfate 20 meq/ Calcium Gluconate 10 meq/ 

Multivitamins 10 ml/Chromium/ Copper/Manganese/ Seleni/Zn 0.5 ml/ Insulin Human 

Regular 30 unit/ Total Parenteral Nutrition/Amino Acids/Dextrose/ Fat Emulsion 

Intravenous 1,920 ml @  80 mls/hr TPN  CONT IV  Last administered on 4/29/20at 

22:22;  Start 4/29/20 at 22:00;  Stop 4/30/20 at 21:59;  Status DC


Cyclobenzaprine HCl (Flexeril) 10 mg PRN Q6HRS  PRN PO MUSCLE SPASMS;  Start 

4/30/20 at 10:45


Potassium Acetate 55 meq/Magnesium Sulfate 20 meq/ Calcium Gluconate 10 meq/ 

Multivitamins 10 ml/Chromium/ Copper/Manganese/ Seleni/Zn 0.5 ml/ Insulin Human 

Regular 30 unit/ Total Parenteral Nutrition/Amino Acids/Dextrose/ Fat Emulsion 

Intravenous 1,920 ml @  80 mls/hr TPN  CONT IV  Last administered on 5/1/20at 

01:00;  Start 4/30/20 at 22:00;  Stop 5/1/20 at 21:59;  Status DC


Magnesium Sulfate 50 ml @ 25 mls/hr 1X  ONCE IV  Last administered on 4/30/20at 

17:18;  Start 4/30/20 at 12:45;  Stop 4/30/20 at 14:44;  Status DC


Potassium Chloride/Water 100 ml @  100 mls/hr 1X  ONCE IV  Last administered on 

5/1/20at 11:27;  Start 5/1/20 at 12:00;  Stop 5/1/20 at 12:59;  Status DC


Hydromorphone HCl (Dilaudid Standard PCA) 12 mg STK-MED ONCE IV ;  Start 4/29/20

at 10:50;  Stop 5/1/20 at 11:02;  Status DC


Hydromorphone HCl (Dilaudid Standard PCA) 12 mg STK-MED ONCE IV ;  Start 4/30/20

at 13:47;  Stop 5/1/20 at 11:03;  Status DC


Potassium Acetate 30 meq/Magnesium Sulfate 20 meq/ Calcium Gluconate 10 meq/ 

Multivitamins 10 ml/Chromium/ Copper/Manganese/ Seleni/Zn 0.5 ml/ Insulin Human 

Regular 30 unit/ Potassium Chloride 30 meq/ Total Parenteral Nutrition/Amino 

Acids/Dextrose/ Fat Emulsion Intravenous 1,920 ml @  80 mls/hr TPN  CONT IV  

Last administered on 5/1/20at 22:34;  Start 5/1/20 at 22:00;  Stop 5/2/20 at 

21:59;  Status DC


Potassium Chloride/Water 100 ml @  100 mls/hr Q1H IV  Last administered on 

5/2/20at 13:05;  Start 5/2/20 at 07:00;  Stop 5/2/20 at 10:59;  Status DC


Magnesium Sulfate 50 ml @ 25 mls/hr 1X  ONCE IV  Last administered on 5/2/20at 

10:34;  Start 5/2/20 at 10:30;  Stop 5/2/20 at 12:29;  Status DC


Potassium Chloride 75 meq/ Magnesium Sulfate 20 meq/Calcium Gluconate 10 meq/ 

Multivitamins 10 ml/Chromium/ Copper/Manganese/ Seleni/Zn 0.5 ml/ Insulin Human 

Regular 30 unit/ Total Parenteral Nutrition/Amino Acids/Dextrose/ Fat Emulsion 

Intravenous 1,920 ml @  80 mls/hr TPN  CONT IV  Last administered on 5/2/20at 

21:51;  Start 5/2/20 at 22:00;  Stop 5/3/20 at 22:00;  Status DC


Potassium Chloride 75 meq/ Magnesium Sulfate 20 meq/Calcium Gluconate 10 meq/ 

Multivitamins 10 ml/Chromium/ Copper/Manganese/ Seleni/Zn 0.5 ml/ Insulin Human 

Regular 25 unit/ Total Parenteral Nutrition/Amino Acids/Dextrose/ Fat Emulsion 

Intravenous 1,920 ml @  80 mls/hr TPN  CONT IV  Last administered on 5/3/20at 

22:04;  Start 5/3/20 at 22:00;  Stop 5/4/20 at 21:59;  Status DC


Hydromorphone HCl (Dilaudid) 0.4 mg PRN Q4HRS  PRN IVP PAIN Last administered on

5/4/20at 10:57;  Start 5/4/20 at 09:00;  Stop 5/4/20 at 18:59;  Status DC


Micafungin Sodium 100 mg/Dextrose 100 ml @  100 mls/hr Q24H IV  Last 

administered on 5/26/20at 12:17;  Start 5/4/20 at 11:00;  Stop 5/27/20 at 09:59;

 Status DC


Daptomycin 485 mg/ Sodium Chloride 50 ml @  100 mls/hr Q24H IV  Last 

administered on 5/11/20at 13:10;  Start 5/4/20 at 11:00;  Stop 5/12/20 at 07:44;

 Status DC


Potassium Chloride 75 meq/ Magnesium Sulfate 15 meq/Calcium Gluconate 8 meq/ 

Multivitamins 10 ml/Chromium/ Copper/Manganese/ Seleni/Zn 0.5 ml/ Insulin Human 

Regular 25 unit/ Total Parenteral Nutrition/Amino Acids/Dextrose/ Fat Emulsion 

Intravenous 1,920 ml @  80 mls/hr TPN  CONT IV  Last administered on 5/4/20at 

23:08;  Start 5/4/20 at 22:00;  Stop 5/5/20 at 21:59;  Status DC


Haloperidol Lactate (Haldol Inj) 3 mg 1X  ONCE IVP  Last administered on 

5/4/20at 14:37;  Start 5/4/20 at 14:30;  Stop 5/4/20 at 14:31;  Status DC


Hydromorphone HCl (Dilaudid) 1 mg PRN Q4HRS  PRN IVP PAIN Last administered on 

5/18/20at 06:25;  Start 5/4/20 at 19:00;  Stop 5/18/20 at 17:10;  Status DC


Potassium Chloride 75 meq/ Magnesium Sulfate 15 meq/Calcium Gluconate 8 meq/ 

Multivitamins 10 ml/Chromium/ Copper/Manganese/ Seleni/Zn 0.5 ml/ Insulin Human 

Regular 20 unit/ Total Parenteral Nutrition/Amino Acids/Dextrose/ Fat Emulsion 

Intravenous 1,920 ml @  80 mls/hr TPN  CONT IV  Last administered on 5/5/20at 

22:10;  Start 5/5/20 at 22:00;  Stop 5/6/20 at 21:59;  Status DC


Lidocaine HCl (Buffered Lidocaine 1%) 3 ml STK-MED ONCE .ROUTE ;  Start 5/6/20 

at 11:31;  Stop 5/6/20 at 11:31;  Status DC


Lidocaine HCl (Buffered Lidocaine 1%) 3 ml STK-MED ONCE .ROUTE ;  Start 5/6/20 

at 12:28;  Stop 5/6/20 at 12:29;  Status DC


Lidocaine HCl (Buffered Lidocaine 1%) 6 ml 1X  ONCE INJ  Last administered on 

5/6/20at 12:53;  Start 5/6/20 at 12:45;  Stop 5/6/20 at 12:46;  Status DC


Potassium Chloride 75 meq/ Magnesium Sulfate 15 meq/Calcium Gluconate 8 meq/ 

Multivitamins 10 ml/Chromium/ Copper/Manganese/ Seleni/Zn 0.5 ml/ Insulin Human 

Regular 20 unit/ Total Parenteral Nutrition/Amino Acids/Dextrose/ Fat Emulsion 

Intravenous 1,920 ml @  80 mls/hr TPN  CONT IV  Last administered on 5/6/20at 

22:00;  Start 5/6/20 at 22:00;  Stop 5/7/20 at 21:59;  Status DC


Potassium Chloride 75 meq/ Magnesium Sulfate 15 meq/Calcium Gluconate 8 meq/ 

Multivitamins 10 ml/Chromium/ Copper/Manganese/ Seleni/Zn 0.5 ml/ Insulin Human 

Regular 15 unit/ Total Parenteral Nutrition/Amino Acids/Dextrose/ Fat Emulsion 

Intravenous 1,920 ml @  80 mls/hr TPN  CONT IV  Last administered on 5/7/20at 

22:28;  Start 5/7/20 at 22:00;  Stop 5/8/20 at 21:59;  Status DC


Vecuronium Bromide (Norcuron Bolus) 6 mg PRN Q6HRS  PRN IV VENT ASYNCHRONY;  

Start 5/7/20 at 19:15;  Stop 5/7/20 at 19:35;  Status DC


Bumetanide (Bumex) 2 mg 1X  ONCE IV  Last administered on 5/7/20at 22:09;  Start

5/7/20 at 19:45;  Stop 5/7/20 at 19:46;  Status DC


Lidocaine HCl (Buffered Lidocaine 1%) 3 ml STK-MED ONCE .ROUTE ;  Start 5/8/20 

at 07:59;  Stop 5/8/20 at 07:59;  Status DC


Midazolam HCl (Versed) 5 mg STK-MED ONCE .ROUTE ;  Start 5/8/20 at 08:36;  Stop 

5/8/20 at 08:36;  Status DC


Fentanyl Citrate (Fentanyl 5ml Vial) 250 mcg STK-MED ONCE .ROUTE ;  Start 5/8/20

at 08:36;  Stop 5/8/20 at 08:37;  Status DC


Lidocaine HCl (Buffered Lidocaine 1%) 3 ml 1X  ONCE IJ  Last administered on 

5/8/20at 09:30;  Start 5/8/20 at 09:15;  Stop 5/8/20 at 09:16;  Status DC


Midazolam HCl (Versed) 5 mg 1X  ONCE IV  Last administered on 5/8/20at 09:30;  

Start 5/8/20 at 09:15;  Stop 5/8/20 at 09:16;  Status DC


Fentanyl Citrate (Fentanyl 5ml Vial) 250 mcg 1X  ONCE IV  Last administered on 

5/8/20at 09:30;  Start 5/8/20 at 09:15;  Stop 5/8/20 at 09:16;  Status DC


Bumetanide (Bumex) 2 mg DAILY IV  Last administered on 5/18/20at 08:07;  Start 

5/8/20 at 10:00;  Stop 5/18/20 at 17:15;  Status DC


Potassium Chloride 75 meq/ Magnesium Sulfate 15 meq/ Multivitamins 10 

ml/Chromium/ Copper/Manganese/ Seleni/Zn 0.5 ml/ Insulin Human Regular 15 unit/ 

Total Parenteral Nutrition/Amino Acids/Dextrose/ Fat Emulsion Intravenous 1,920 

ml @  80 mls/hr TPN  CONT IV  Last administered on 5/8/20at 21:59;  Start 5/8/20

at 22:00;  Stop 5/9/20 at 21:59;  Status DC


Metoclopramide HCl (Reglan Vial) 10 mg PRN Q3HRS  PRN IVP NAUSEA/VOMITING-3rd 

choice Last administered on 5/14/20at 04:25;  Start 5/9/20 at 16:45


Potassium Chloride 75 meq/ Magnesium Sulfate 15 meq/ Multivitamins 10 

ml/Chromium/ Copper/Manganese/ Seleni/Zn 0.5 ml/ Insulin Human Regular 15 unit/ 

Total Parenteral Nutrition/Amino Acids/Dextrose/ Fat Emulsion Intravenous 1,920 

ml @  80 mls/hr TPN  CONT IV  Last administered on 5/9/20at 22:41;  Start 5/9/20

at 22:00;  Stop 5/10/20 at 21:59;  Status DC


Magnesium Sulfate 50 ml @ 25 mls/hr 1X  ONCE IV  Last administered on 5/10/20at 

10:44;  Start 5/10/20 at 09:00;  Stop 5/10/20 at 10:59;  Status DC


Potassium Chloride/Water 100 ml @  100 mls/hr 1X  ONCE IV  Last administered on 

5/10/20at 09:37;  Start 5/10/20 at 09:00;  Stop 5/10/20 at 09:59;  Status DC


Duloxetine HCl (Cymbalta) 30 mg DAILY PO  Last administered on 5/11/20at 09:48; 

Start 5/10/20 at 14:00;  Stop 5/13/20 at 10:25;  Status DC


Potassium Chloride 80 meq/ Magnesium Sulfate 20 meq/ Multivitamins 10 

ml/Chromium/ Copper/Manganese/ Seleni/Zn 0.5 ml/ Insulin Human Regular 15 unit/ 

Total Parenteral Nutrition/Amino Acids/Dextrose/ Fat Emulsion Intravenous 1,920 

ml @  80 mls/hr TPN  CONT IV  Last administered on 5/10/20at 21:42;  Start 

5/10/20 at 22:00;  Stop 5/11/20 at 21:59;  Status DC


Potassium Chloride 80 meq/ Magnesium Sulfate 20 meq/ Multivitamins 10 

ml/Chromium/ Copper/Manganese/ Seleni/Zn 0.5 ml/ Insulin Human Regular 15 unit/ 

Total Parenteral Nutrition/Amino Acids/Dextrose/ Fat Emulsion Intravenous 1,920 

ml @  80 mls/hr TPN  CONT IV  Last administered on 5/11/20at 22:20;  Start 

5/11/20 at 22:00;  Stop 5/12/20 at 21:59;  Status DC


Lidocaine HCl (Buffered Lidocaine 1%) 3 ml STK-MED ONCE .ROUTE ;  Start 5/12/20 

at 09:54;  Stop 5/12/20 at 09:55;  Status DC


Hydromorphone HCl (Dilaudid Standard PCA) 12 mg STK-MED ONCE IV ;  Start 5/1/20 

at 15:50;  Stop 5/12/20 at 11:24;  Status DC


Potassium Chloride 80 meq/ Magnesium Sulfate 20 meq/ Multivitamins 10 

ml/Chromium/ Copper/Manganese/ Seleni/Zn 0.5 ml/ Insulin Human Regular 15 unit/ 

Total Parenteral Nutrition/Amino Acids/Dextrose/ Fat Emulsion Intravenous 1,920 

ml @  80 mls/hr TPN  CONT IV  Last administered on 5/12/20at 21:40;  Start 

5/12/20 at 22:00;  Stop 5/13/20 at 21:59;  Status DC


Lidocaine HCl (Buffered Lidocaine 1%) 6 ml 1X  ONCE INJ  Last administered on 

5/12/20at 14:15;  Start 5/12/20 at 14:15;  Stop 5/12/20 at 14:16;  Status DC


Potassium Chloride 80 meq/ Magnesium Sulfate 20 meq/ Multivitamins 10 

ml/Chromium/ Copper/Manganese/ Seleni/Zn 1 ml/ Insulin Human Regular 15 unit/ To

chely Parenteral Nutrition/Amino Acids/Dextrose/ Fat Emulsion Intravenous 1,920 ml

@  80 mls/hr TPN  CONT IV  Last administered on 5/13/20at 22:04;  Start 5/13/20 

at 22:00;  Stop 5/14/20 at 21:59;  Status DC


Potassium Chloride/Water 100 ml @  100 mls/hr 1X  ONCE IV  Last administered on 

5/14/20at 11:34;  Start 5/14/20 at 11:00;  Stop 5/14/20 at 11:59;  Status DC


Potassium Chloride 90 meq/ Magnesium Sulfate 20 meq/ Multivitamins 10 

ml/Chromium/ Copper/Manganese/ Seleni/Zn 1 ml/ Insulin Human Regular 15 unit/ T

otal Parenteral Nutrition/Amino Acids/Dextrose/ Fat Emulsion Intravenous 1,920 

ml @  80 mls/hr TPN  CONT IV  Last administered on 5/14/20at 22:57;  Start 

5/14/20 at 22:00;  Stop 5/15/20 at 21:59;  Status DC


Potassium Chloride 90 meq/ Magnesium Sulfate 20 meq/ Multivitamins 10 

ml/Chromium/ Copper/Manganese/ Seleni/Zn 1 ml/ Insulin Human Regular 15 unit/ 

Total Parenteral Nutrition/Amino Acids/Dextrose/ Fat Emulsion Intravenous 1,920 

ml @  80 mls/hr TPN  CONT IV  Last administered on 5/15/20at 22:48;  Start 5/

15/20 at 22:00;  Stop 5/16/20 at 21:59;  Status DC


Potassium Chloride 90 meq/ Magnesium Sulfate 20 meq/ Multivitamins 10 ml/

mium/ Copper/Manganese/ Seleni/Zn 1 ml/ Insulin Human Regular 15 unit/ Total 

Parenteral Nutrition/Amino Acids/Dextrose/ Fat Emulsion Intravenous 1,890 ml @  

78.75 mls/ hr TPN  CONT IV  Last administered on 5/16/20at 22:15;  Start 5/16/20

at 22:00;  Stop 5/17/20 at 21:59;  Status DC


Linezolid/Dextrose 300 ml @  300 mls/hr Q12HR IV  Last administered on 5/19/20at

21:08;  Start 5/17/20 at 09:00;  Stop 5/20/20 at 08:11;  Status DC


Daptomycin 450 mg/ Sodium Chloride 50 ml @  100 mls/hr Q24H IV  Last 

administered on 5/20/20at 09:25;  Start 5/17/20 at 09:00;  Stop 5/21/20 at 

08:30;  Status DC


Potassium Chloride 90 meq/ Magnesium Sulfate 20 meq/ Multivitamins 10 

ml/Chromium/ Copper/Manganese/ Seleni/Zn 1 ml/ Insulin Human Regular 15 unit/ 

Total Parenteral Nutrition/Amino Acids/Dextrose/ Fat Emulsion Intravenous 1,890 

ml @  78.75 mls/ hr TPN  CONT IV  Last administered on 5/17/20at 21:34;  Start 

5/17/20 at 22:00;  Stop 5/18/20 at 21:59;  Status DC


Lorazepam (Ativan Inj) 2 mg STK-MED ONCE .ROUTE ;  Start 5/17/20 at 14:58;  Stop

5/17/20 at 14:58;  Status DC


Metoprolol Tartrate (Lopressor Vial) 5 mg 1X  ONCE IVP  Last administered on 

5/17/20at 15:31;  Start 5/17/20 at 15:15;  Stop 5/17/20 at 15:16;  Status DC


Lorazepam (Ativan Inj) 2 mg 1X  ONCE IVP  Last administered on 5/17/20at 15:30; 

Start 5/17/20 at 15:15;  Stop 5/17/20 at 15:16;  Status DC


Enoxaparin Sodium (Lovenox 40mg Syringe) 40 mg Q24H SQ  Last administered on 

6/5/20at 17:44;  Start 5/17/20 at 17:00;  Stop 6/7/20 at 06:50;  Status DC


Lorazepam (Ativan Inj) 1 mg PRN Q4HRS  PRN IVP ANXIETY / AGITATION MILD-MOD Last

administered on 5/31/20at 15:55;  Start 5/17/20 at 19:15;  Stop 6/2/20 at 11:45;

 Status DC


Lorazepam (Ativan Inj) 2 mg PRN Q4HRS  PRN IVP ANXIETY / AGITATION SEVERE Last 

administered on 6/1/20at 07:55;  Start 5/17/20 at 19:15;  Stop 6/2/20 at 11:45; 

Status DC


Fentanyl Citrate (Fentanyl 2ml Vial) 50 mcg PRN Q4HRS  PRN IVP SEVERE PAIN Last 

administered on 6/13/20at 05:15;  Start 5/18/20 at 13:15;  Stop 6/14/20 at 

09:29;  Status DC


Fentanyl Citrate (Fentanyl 2ml Vial) 25 mcg PRN Q4HRS  PRN IVP MODERATE PAIN 

Last administered on 6/13/20at 00:27;  Start 5/18/20 at 13:15;  Stop 6/14/20 at 

09:30;  Status DC


Potassium Chloride 90 meq/ Magnesium Sulfate 20 meq/ Multivitamins 10 

ml/Chromium/ Copper/Manganese/ Seleni/Zn 1 ml/ Insulin Human Regular 15 unit/ 

Total Parenteral Nutrition/Amino Acids/Dextrose/ Fat Emulsion Intravenous 1,890 

ml @  78.75 mls/ hr TPN  CONT IV  Last administered on 5/18/20at 22:18;  Start 

5/18/20 at 22:00;  Stop 5/19/20 at 21:59;  Status DC


Furosemide (Lasix) 40 mg 1X  ONCE IVP  Last administered on 5/18/20at 21:51;  

Start 5/18/20 at 21:45;  Stop 5/18/20 at 21:48;  Status DC


Albumin Human 100 ml @  100 mls/hr 1X PRN  PRN IV SEE COMMENTS;  Start 5/19/20 

at 01:30


Furosemide (Lasix) 40 mg [id number] IVP  Last administered on 6/3/20at 08:04;  Start 

5/19/20 at 14:00;  Stop 6/3/20 at 13:07;  Status DC


Potassium Chloride 90 meq/ Magnesium Sulfate 20 meq/ Multivitamins 10 

ml/Chromium/ Copper/Manganese/ Seleni/Zn 1 ml/ Insulin Human Regular 15 unit/ 

Total Parenteral Nutrition/Amino Acids/Dextrose/ Fat Emulsion Intravenous 1,800 

ml @  75 mls/hr TPN  CONT IV  Last administered on 5/19/20at 22:31;  Start 

5/19/20 at 22:00;  Stop 5/20/20 at 21:59;  Status DC


Potassium Chloride 90 meq/ Magnesium Sulfate 20 meq/ Multivitamins 10 

ml/Chromium/ Copper/Manganese/ Seleni/Zn 1 ml/ Insulin Human Regular 15 unit/ 

Total Parenteral Nutrition/Amino Acids/Dextrose/ Fat Emulsion Intravenous 1,800 

ml @  75 mls/hr TPN  CONT IV  Last administered on 5/20/20at 22:28;  Start 

5/20/20 at 22:00;  Stop 5/21/20 at 21:59;  Status DC


Potassium Chloride 110 meq/ Magnesium Sulfate 20 meq/ Multivitamins 10 

ml/Chromium/ Copper/Manganese/ Seleni/Zn 1 ml/ Insulin Human Regular 15 unit/ 

Total Parenteral Nutrition/Amino Acids/Dextrose/ Fat Emulsion Intravenous 1,800 

ml @  75 mls/hr TPN  CONT IV  Last administered on 5/21/20at 22:01;  Start 

5/21/20 at 22:00;  Stop 5/22/20 at 21:59;  Status DC


Saliva Substitute (Biotene Moisturizing Mouth) 2 spray PRN Q15MIN  PRN PO DRY 

MOUTH;  Start 5/21/20 at 11:00


Potassium Chloride 110 meq/ Magnesium Sulfate 20 meq/ Multivitamins 10 

ml/Chromium/ Copper/Manganese/ Seleni/Zn 1 ml/ Insulin Human Regular 15 unit/ 

Total Parenteral Nutrition/Amino Acids/Dextrose/ Fat Emulsion Intravenous 1,800 

ml @  75 mls/hr TPN  CONT IV  Last administered on 5/22/20at 22:21;  Start 

5/22/20 at 22:00;  Stop 5/23/20 at 21:59;  Status DC


Potassium Chloride 110 meq/ Magnesium Sulfate 20 meq/ Multivitamins 10 

ml/Chromium/ Copper/Manganese/ Seleni/Zn 1 ml/ Insulin Human Regular 15 unit/ 

Total Parenteral Nutrition/Amino Acids/Dextrose/ Fat Emulsion Intravenous 1,800 

ml @  75 mls/hr TPN  CONT IV  Last administered on 5/23/20at 22:04;  Start 

5/23/20 at 22:00;  Stop 5/24/20 at 21:59;  Status DC


Potassium Chloride 110 meq/ Magnesium Sulfate 20 meq/ Multivitamins 10 

ml/Chromium/ Copper/Manganese/ Seleni/Zn 1 ml/ Insulin Human Regular 15 unit/ 

Total Parenteral Nutrition/Amino Acids/Dextrose/ Fat Emulsion Intravenous 1,800 

ml @  75 mls/hr TPN  CONT IV  Last administered on 5/24/20at 22:48;  Start 

5/24/20 at 22:00;  Stop 5/25/20 at 21:59;  Status DC


Potassium Chloride 70 meq/ Magnesium Sulfate 20 meq/ Multivitamins 10 

ml/Chromium/ Copper/Manganese/ Seleni/Zn 1 ml/ Insulin Human Regular 15 unit/ 

Total Parenteral Nutrition/Amino Acids/Dextrose/ Fat Emulsion Intravenous 1,800 

ml @  75 mls/hr TPN  CONT IV  Last administered on 5/25/20at 21:39;  Start 

5/25/20 at 22:00;  Stop 5/26/20 at 21:59;  Status DC


Meropenem 500 mg/ Sodium Chloride 50 ml @  100 mls/hr Q6HRS IV  Last 

administered on 5/27/20at 06:02;  Start 5/25/20 at 18:00;  Stop 5/27/20 at 

09:59;  Status DC


Barium Sulfate (Varibar Thin Liquid Apple) 148 gm 1X  ONCE PO ;  Start 5/26/20 

at 11:45;  Stop 5/26/20 at 11:49;  Status DC


Potassium Chloride 70 meq/ Magnesium Sulfate 20 meq/ Multivitamins 10 ml/Chromiu

m/ Copper/Manganese/ Seleni/Zn 1 ml/ Insulin Human Regular 15 unit/ Total 

Parenteral Nutrition/Amino Acids/Dextrose/ Fat Emulsion Intravenous 1,800 ml @  

75 mls/hr TPN  CONT IV  Last administered on 5/26/20at 22:27;  Start 5/26/20 at 

22:00;  Stop 5/27/20 at 21:59;  Status DC


Piperacillin Sod/ Tazobactam Sod 3.375 gm/Sodium Chloride 50 ml @  100 mls/hr 

Q6HRS IV  Last administered on 6/4/20at 06:10;  Start 5/27/20 at 12:00;  Stop 

6/4/20 at 07:26;  Status DC


Potassium Chloride 70 meq/ Magnesium Sulfate 20 meq/ Multivitamins 10 

ml/Chromium/ Copper/Manganese/ Seleni/Zn 1 ml/ Insulin Human Regular 15 unit/ 

Total Parenteral Nutrition/Amino Acids/Dextrose/ Fat Emulsion Intravenous 1,800 

ml @  75 mls/hr TPN  CONT IV  Last administered on 5/27/20at 22:03;  Start 5/27/ 20 at 22:00;  Stop 5/28/20 at 21:59;  Status DC


Potassium Chloride 70 meq/ Magnesium Sulfate 20 meq/ Multivitamins 10 ml/Chromiu

m/ Copper/Manganese/ Seleni/Zn 1 ml/ Insulin Human Regular 15 unit/ Total 

Parenteral Nutrition/Amino Acids/Dextrose/ Fat Emulsion Intravenous 1,800 ml @  

75 mls/hr TPN  CONT IV  Last administered on 5/28/20at 22:33;  Start 5/28/20 at 

22:00;  Stop 5/29/20 at 21:59;  Status DC


Potassium Chloride 70 meq/ Magnesium Sulfate 20 meq/ Multivitamins 10 

ml/Chromium/ Copper/Manganese/ Seleni/Zn 1 ml/ Insulin Human Regular 15 unit/ 

Total Parenteral Nutrition/Amino Acids/Dextrose/ Fat Emulsion Intravenous 1,800 

ml @  75 mls/hr TPN  CONT IV  Last administered on 5/29/20at 23:13;  Start 

5/29/20 at 22:00;  Stop 5/30/20 at 21:59;  Status DC


Potassium Chloride 80 meq/ Magnesium Sulfate 20 meq/ Multivitamins 10 

ml/Chromium/ Copper/Manganese/ Seleni/Zn 1 ml/ Insulin Human Regular 15 unit/ 

Total Parenteral Nutrition/Amino Acids/Dextrose/ Fat Emulsion Intravenous 1,800 

ml @  75 mls/hr TPN  CONT IV  Last administered on 5/30/20at 22:30;  Start 

5/30/20 at 22:00;  Stop 5/31/20 at 21:59;  Status DC


Potassium Chloride 80 meq/ Magnesium Sulfate 20 meq/ Multivitamins 10 

ml/Chromium/ Copper/Manganese/ Seleni/Zn 1 ml/ Insulin Human Regular 15 unit/ 

Total Parenteral Nutrition/Amino Acids/Dextrose/ Fat Emulsion Intravenous 1,800 

ml @  75 mls/hr TPN  CONT IV  Last administered on 5/31/20at 21:54;  Start 

5/31/20 at 22:00;  Stop 6/1/20 at 21:59;  Status DC


Potassium Chloride/Water 100 ml @  100 mls/hr 1X  ONCE IV  Last administered on 

6/1/20at 10:15;  Start 6/1/20 at 10:00;  Stop 6/1/20 at 10:59;  Status DC


Potassium Chloride 90 meq/ Magnesium Sulfate 20 meq/ Multivitamins 10 

ml/Chromium/ Copper/Manganese/ Seleni/Zn 1 ml/ Insulin Human Regular 20 unit/ 

Total Parenteral Nutrition/Amino Acids/Dextrose/ Fat Emulsion Intravenous 1,800 

ml @  75 mls/hr TPN  CONT IV  Last administered on 6/1/20at 22:28;  Start 6/1/20

at 22:00;  Stop 6/2/20 at 21:59;  Status DC


Potassium Chloride 90 meq/ Magnesium Sulfate 20 meq/ Multivitamins 10 

ml/Chromium/ Copper/Manganese/ Seleni/Zn 1 ml/ Insulin Human Regular 20 unit/ 

Total Parenteral Nutrition/Amino Acids/Dextrose/ Fat Emulsion Intravenous 1,800 

ml @  75 mls/hr TPN  CONT IV  Last administered on 6/2/20at 22:08;  Start 6/2/20

at 22:00;  Stop 6/3/20 at 21:59;  Status DC


Lorazepam (Ativan Inj) 0.25 mg PRN Q4HRS  PRN IVP ANXIETY / AGITATION Last 

administered on 6/13/20at 02:25;  Start 6/3/20 at 07:30


Potassium Chloride 90 meq/ Magnesium Sulfate 20 meq/ Multivitamins 10 

ml/Chromium/ Copper/Manganese/ Seleni/Zn 1 ml/ Insulin Human Regular 20 unit/ 

Total Parenteral Nutrition/Amino Acids/Dextrose/ Fat Emulsion Intravenous 1,800 

ml @  75 mls/hr TPN  CONT IV  Last administered on 6/3/20at 23:13;  Start 6/3/20

at 22:00;  Stop 6/4/20 at 21:59;  Status DC


Furosemide (Lasix) 40 mg DAILY IVP  Last administered on 6/5/20at 11:14;  Start 

6/3/20 at 13:30;  Stop 6/7/20 at 09:12;  Status DC


Fluoxetine HCl (PROzac) 20 mg QHS PEG  Last administered on 6/23/20at 21:46;  

Start 6/4/20 at 21:00


Fentanyl (Duragesic 50mcg/ Hr Patch) 1 patch Q72H TD  Last administered on 

6/4/20at 21:22;  Start 6/4/20 at 21:00;  Stop 6/13/20 at 12:00;  Status DC


Potassium Chloride 40 meq/ Potassium Acetate 60 meq/Magnesium Sulfate 10 meq/ 

Multivitamins 10 ml/Chromium/ Copper/Manganese/ Seleni/Zn 1 ml/ Insulin Human 

Regular 20 unit/ Total Parenteral Nutrition/Amino Acids/Dextrose/ Fat Emulsion 

Intravenous 1,800 ml @  75 mls/hr TPN  CONT IV  Last administered on 6/5/20at 

00:03;  Start 6/4/20 at 22:00;  Stop 6/5/20 at 21:59;  Status DC


Potassium Acetate 80 meq/Magnesium Sulfate 5 meq/ Multivitamins 10 ml/Chromium/ 

Copper/Manganese/ Seleni/Zn 1 ml/ Insulin Human Regular 20 unit/ Total 

Parenteral Nutrition/Amino Acids/Dextrose/ Fat Emulsion Intravenous 1,920 ml @  

80 mls/hr TPN  CONT IV  Last administered on 6/5/20at 21:59;  Start 6/5/20 at 

22:00;  Stop 6/6/20 at 21:59;  Status DC


Potassium Acetate 60 meq/Magnesium Sulfate 5 meq/ Multivitamins 10 ml/Chromium/ 

Copper/Manganese/ Seleni/Zn 1 ml/ Insulin Human Regular 30 unit/ Total 

Parenteral Nutrition/Amino Acids/Dextrose/ Fat Emulsion Intravenous 1,920 ml @  

80 mls/hr TPN  CONT IV  Last administered on 6/6/20at 21:54;  Start 6/6/20 at 

22:00;  Stop 6/7/20 at 21:59;  Status DC


Norepinephrine Bitartrate 8 mg/ Dextrose 258 ml @  13.332 mls/ hr CONT  PRN IV 

PER PROTOCOL Last administered on 6/7/20at 21:46;  Start 6/7/20 at 06:30


Albumin Human 500 ml @  125 mls/hr 1X  ONCE IV  Last administered on 6/7/20at 

08:10;  Start 6/7/20 at 08:15;  Stop 6/7/20 at 12:14;  Status DC


Potassium Acetate 40 meq/Magnesium Sulfate 5 meq/ Multivitamins 10 ml/Chromium/ 

Copper/Manganese/ Seleni/Zn 1 ml/ Insulin Human Regular 30 unit/ Total 

Parenteral Nutrition/Amino Acids/Dextrose/ Fat Emulsion Intravenous 1,920 ml @  

80 mls/hr TPN  CONT IV  Last administered on 6/7/20at 22:23;  Start 6/7/20 at 

22:00;  Stop 6/8/20 at 21:59;  Status DC


Meropenem 1 gm/ Sodium Chloride 100 ml @  200 mls/hr Q8HRS IV ;  Start 6/7/20 at

14:00;  Status Cancel


Meropenem 1 gm/ Sodium Chloride 100 ml @  200 mls/hr Q8HRS IV  Last administered

on 6/7/20at 11:04;  Start 6/7/20 at 10:00;  Stop 6/7/20 at 13:00;  Status DC


Meropenem 1 gm/ Sodium Chloride 100 ml @  200 mls/hr Q12HR IV  Last administered

on 6/24/20at 08:05;  Start 6/7/20 at 21:00


Sodium Chloride 1,000 ml @  1,000 mls/hr 1X  ONCE IV  Last administered on 

6/7/20at 11:06;  Start 6/7/20 at 10:45;  Stop 6/7/20 at 11:44;  Status DC


Micafungin Sodium 100 mg/Dextrose 100 ml @  100 mls/hr Q24H IV  Last 

administered on 6/23/20at 12:20;  Start 6/7/20 at 11:00


Daptomycin 410 mg/ Sodium Chloride 50 ml @  100 mls/hr Q24H IV  Last 

administered on 6/9/20at 13:33;  Start 6/7/20 at 14:00;  Stop 6/10/20 at 08:30; 

Status DC


Midazolam HCl (Versed) 2 mg STK-MED ONCE .ROUTE ;  Start 6/7/20 at 14:47;  Stop 

6/7/20 at 14:48;  Status DC


Fentanyl Citrate (Fentanyl 2ml Vial) 100 mcg STK-MED ONCE .ROUTE ;  Start 6/7/20

at 14:47;  Stop 6/7/20 at 14:48;  Status DC


Flumazenil (Romazicon) 0.5 mg STK-MED ONCE IV ;  Start 6/7/20 at 14:48;  Stop 

6/7/20 at 14:48;  Status DC


Naloxone HCl (Narcan) 0.4 mg STK-MED ONCE .ROUTE ;  Start 6/7/20 at 14:48;  Stop

6/7/20 at 14:48;  Status DC


Lidocaine HCl (Lidocaine 1% 20ml Vial) 20 ml STK-MED ONCE .ROUTE ;  Start 6/7/20

at 14:48;  Stop 6/7/20 at 14:48;  Status DC


Midazolam HCl (Versed) 2 mg 1X  ONCE IV  Last administered on 6/7/20at 15:28;  

Start 6/7/20 at 15:00;  Stop 6/7/20 at 15:01;  Status DC


Fentanyl Citrate (Fentanyl 2ml Vial) 100 mcg 1X  ONCE IV  Last administered on 

6/7/20at 15:28;  Start 6/7/20 at 15:00;  Stop 6/7/20 at 15:01;  Status DC


Lidocaine HCl (Lidocaine 1% 20ml Vial) 20 ml 1X  ONCE INJ  Last administered on 

6/7/20at 15:30;  Start 6/7/20 at 15:00;  Stop 6/7/20 at 15:01;  Status DC


Sodium Chloride 1,000 ml @  100 mls/hr Q10H IV  Last administered on 6/16/20at 

07:30;  Start 6/7/20 at 20:00;  Stop 6/16/20 at 11:26;  Status DC


Sodium Bicarbonate (Sodium Bicarb Adult 8.4% Syr) 50 meq 1X  ONCE IV  Last 

administered on 6/7/20at 21:47;  Start 6/7/20 at 22:00;  Stop 6/7/20 at 22:01;  

Status DC


Potassium Acetate 40 meq/Magnesium Sulfate 5 meq/ Multivitamins 10 ml/Chromium/ 

Copper/Manganese/ Seleni/Zn 1 ml/ Insulin Human Regular 30 unit/ Total 

Parenteral Nutrition/Amino Acids/Dextrose/ Fat Emulsion Intravenous 1,920 ml @  

80 mls/hr TPN  CONT IV  Last administered on 6/8/20at 22:28;  Start 6/8/20 at 

22:00;  Stop 6/9/20 at 21:59;  Status DC


Sodium Chloride 500 ml @  500 mls/hr 1X  ONCE IV  Last administered on 6/9/20at 

06:39;  Start 6/9/20 at 06:45;  Stop 6/9/20 at 07:44;  Status DC


Potassium Acetate 40 meq/Magnesium Sulfate 5 meq/ Multivitamins 10 ml/Chromium/ 

Copper/Manganese/ Seleni/Zn 1 ml/ Insulin Human Regular 30 unit/ Total 

Parenteral Nutrition/Amino Acids/Dextrose/ Fat Emulsion Intravenous 1,920 ml @  

80 mls/hr TPN  CONT IV  Last administered on 6/9/20at 22:03;  Start 6/9/20 at 

22:00;  Stop 6/10/20 at 21:59;  Status DC


Metoprolol Tartrate (Lopressor Vial) 5 mg PRN Q6HRS  PRN IVP HYPERTENSION Last 

administered on 6/18/20at 10:14;  Start 6/10/20 at 09:00


Potassium Acetate 40 meq/Magnesium Sulfate 5 meq/ Multivitamins 10 ml/Chromium/ 

Copper/Manganese/ Seleni/Zn 1 ml/ Insulin Human Regular 30 unit/ Total 

Parenteral Nutrition/Amino Acids/Dextrose/ Fat Emulsion Intravenous 1,920 ml @  

80 mls/hr TPN  CONT IV  Last administered on 6/10/20at 21:26;  Start 6/10/20 at 

22:00;  Stop 6/11/20 at 21:59;  Status DC


Potassium Acetate 40 meq/Magnesium Sulfate 5 meq/ Multivitamins 10 ml/Chromium/ 

Copper/Manganese/ Seleni/Zn 1 ml/ Insulin Human Regular 30 unit/ Total 

Parenteral Nutrition/Amino Acids/Dextrose/ Fat Emulsion Intravenous 1,920 ml @  

80 mls/hr TPN  CONT IV  Last administered on 6/11/20at 23:23;  Start 6/11/20 at 

22:00;  Stop 6/12/20 at 21:59;  Status DC


Potassium Acetate 40 meq/Magnesium Sulfate 5 meq/ Multivitamins 10 ml/Chromium/ 

Copper/Manganese/ Seleni/Zn 1 ml/ Insulin Human Regular 30 unit/ Total 

Parenteral Nutrition/Amino Acids/Dextrose/ Fat Emulsion Intravenous 1,920 ml @  

80 mls/hr TPN  CONT IV  Last administered on 6/12/20at 21:35;  Start 6/12/20 at 

22:00;  Stop 6/13/20 at 21:59;  Status DC


Furosemide (Lasix) 20 mg 1X  ONCE IVP  Last administered on 6/13/20at 06:26;  

Start 6/13/20 at 06:15;  Stop 6/13/20 at 06:16;  Status DC


Methylprednisolone Sodium Succinate (SOLU-Medrol 125MG VIAL) 125 mg 1X  ONCE IV 

Last administered on 6/13/20at 06:26;  Start 6/13/20 at 06:15;  Stop 6/13/20 at 

06:16;  Status DC


Albuterol/ Ipratropium (Duoneb) 3 ml Q4HRS NEB  Last administered on 6/24/20at 

07:25;  Start 6/13/20 at 08:00


Fentanyl Citrate 30 ml @ 0 mls/hr CONT  PRN IV SEE PROTOCOL Last administered on

6/23/20at 23:29;  Start 6/13/20 at 06:00


Propofol 100 ml @ 0 mls/hr CONT  PRN IV SEE PROTOCOL Last administered on 6 /20/20at 23:50;  Start 6/13/20 at 06:00


Fentanyl Citrate (Fentanyl 2ml Vial) 25 mcg PRN Q1HR  PRN IV SEE COMMENTS;  

Start 6/13/20 at 06:00


Fentanyl Citrate (Fentanyl 2ml Vial) 50 mcg PRN Q1HR  PRN IV SEE COMMENTS;  

Start 6/13/20 at 06:00


Chlorhexidine Gluconate (Peridex) 15 ml BID MM ;  Start 6/13/20 at 09:00;  Stop 

6/13/20 at 07:58;  Status DC


Potassium Acetate 40 meq/Magnesium Sulfate 5 meq/ Multivitamins 10 ml/Chromium/ 

Copper/Manganese/ Seleni/Zn 1 ml/ Insulin Human Regular 30 unit/ Total 

Parenteral Nutrition/Amino Acids/Dextrose/ Fat Emulsion Intravenous 1,920 ml @  

80 mls/hr TPN  CONT IV  Last administered on 6/13/20at 21:19;  Start 6/13/20 at 

22:00;  Stop 6/14/20 at 21:59;  Status DC


Acetylcysteine (Mucomyst 20% Resp Treatment) 600 mg BID NEB  Last administered 

on 6/19/20at 09:33;  Start 6/13/20 at 21:00;  Stop 6/19/20 at 10:39;  Status DC


Magnesium Sulfate 100 ml @  25 mls/hr 1X  ONCE IV  Last administered on 

6/13/20at 15:48;  Start 6/13/20 at 15:45;  Stop 6/13/20 at 19:44;  Status DC


Potassium Acetate 40 meq/Magnesium Sulfate 5 meq/ Multivitamins 10 ml/Chromium/ 

Copper/Manganese/ Seleni/Zn 1 ml/ Insulin Human Regular 30 unit/ Total 

Parenteral Nutrition/Amino Acids/Dextrose/ Fat Emulsion Intravenous 1,920 ml @  

80 mls/hr TPN  CONT IV  Last administered on 6/14/20at 21:35;  Start 6/14/20 at 

22:00;  Stop 6/15/20 at 21:59;  Status DC


Potassium Chloride/Water 100 ml @  100 mls/hr Q1H IV  Last administered on 

6/15/20at 08:31;  Start 6/15/20 at 07:00;  Stop 6/15/20 at 08:59;  Status DC


Potassium Acetate 40 meq/Magnesium Sulfate 5 meq/ Multivitamins 10 ml/Chromium/ 

Copper/Manganese/ Seleni/Zn 1 ml/ Insulin Human Regular 30 unit/ Total 

Parenteral Nutrition/Amino Acids/Dextrose/ Fat Emulsion Intravenous 1,920 ml @  

80 mls/hr TPN  CONT IV  Last administered on 6/15/20at 21:54;  Start 6/15/20 at 

22:00;  Stop 6/16/20 at 19:34;  Status DC


Lidocaine HCl (Buffered Lidocaine 1%) 3 ml STK-MED ONCE .ROUTE ;  Start 6/15/20 

at 12:14;  Stop 6/15/20 at 12:14;  Status DC


Lidocaine HCl (Buffered Lidocaine 1%) 3 ml 1X  ONCE IJ  Last administered on 

6/15/20at 13:11;  Start 6/15/20 at 13:00;  Stop 6/15/20 at 13:01;  Status DC


Magnesium Sulfate 50 ml @ 25 mls/hr 1X  ONCE IV ;  Start 6/16/20 at 08:15;  Stop

6/16/20 at 10:14;  Status DC


Potassium Acetate 40 meq/Magnesium Sulfate 10 meq/ Multivitamins 10 ml/Chromium/

Copper/Manganese/ Seleni/Zn 1 ml/ Insulin Human Regular 20 unit/ Total 

Parenteral Nutrition/Amino Acids/Dextrose/ Fat Emulsion Intravenous 1,920 ml @  

80 mls/hr TPN  CONT IV  Last administered on 6/16/20at 21:32;  Start 6/16/20 at 

22:00;  Stop 6/17/20 at 21:59;  Status DC


Potassium Chloride/Water 100 ml @  100 mls/hr Q1H IV  Last administered on 6/17 /20at 09:12;  Start 6/17/20 at 08:00;  Stop 6/17/20 at 09:59;  Status DC


Alteplase, Recombinant (Cathflo For Central Catheter Clearance) 4 mg 1X  ONCE 

INT CAT ;  Start 6/17/20 at 09:15;  Stop 6/17/20 at 09:16;  Status UNV


Alteplase, Recombinant (Cathflo For Central Catheter Clearance) 4 mg 1X  ONCE 

INT CAT ;  Start 6/17/20 at 09:15;  Stop 6/17/20 at 09:16;  Status UNV


Alteplase, Recombinant (Cathflo For Central Catheter Clearance) 4 mg 1X  ONCE 

INT CAT ;  Start 6/17/20 at 09:15;  Stop 6/17/20 at 09:16;  Status UNV


Alteplase, Recombinant 4 mg/ Sodium Chloride 20 ml @ 20 mls/hr 1X  ONCE IV  Last

administered on 6/17/20at 10:10;  Start 6/17/20 at 10:00;  Stop 6/17/20 at 

10:59;  Status DC


Alteplase, Recombinant 4 mg/ Sodium Chloride 20 ml @ 20 mls/hr 1X  ONCE IV  Last

administered on 6/17/20at 10:09;  Start 6/17/20 at 10:00;  Stop 6/17/20 at 

10:59;  Status DC


Alteplase, Recombinant 4 mg/ Sodium Chloride 20 ml @ 20 mls/hr 1X  ONCE IV  Last

administered on 6/17/20at 10:09;  Start 6/17/20 at 10:00;  Stop 6/17/20 at 

10:59;  Status DC


Potassium Acetate 60 meq/Magnesium Sulfate 10 meq/ Multivitamins 10 ml/Chromium/

Copper/Manganese/ Seleni/Zn 1 ml/ Insulin Human Regular 20 unit/ Total 

Parenteral Nutrition/Amino Acids/Dextrose/ Fat Emulsion Intravenous 1,920 ml @  

80 mls/hr TPN  CONT IV  Last administered on 6/17/20at 21:55;  Start 6/17/20 at 

22:00;  Stop 6/18/20 at 21:59;  Status DC


Albumin Human 500 ml @  125 mls/hr 1X  ONCE IV  Last administered on 6/18/20at 

12:01;  Start 6/18/20 at 11:15;  Stop 6/18/20 at 15:14;  Status DC


Sodium Chloride 500 ml @  500 mls/hr 1X  ONCE IV  Last administered on 6/18/20at

13:50;  Start 6/18/20 at 11:15;  Stop 6/18/20 at 12:14;  Status DC


Potassium Acetate 60 meq/Magnesium Sulfate 14 meq/ Multivitamins 10 ml/Chromium/

Copper/Manganese/ Seleni/Zn 1 ml/ Insulin Human Regular 20 unit/ Total 

Parenteral Nutrition/Amino Acids/Dextrose/ Fat Emulsion Intravenous 1,920 ml @  

80 mls/hr TPN  CONT IV  Last administered on 6/18/20at 22:26;  Start 6/18/20 at 

22:00;  Stop 6/19/20 at 21:59;  Status DC


Ciprofloxacin/ Dextrose 200 ml @  200 mls/hr Q12HR IV  Last administered on 

6/24/20at 08:05;  Start 6/18/20 at 21:00


Albumin Human 250 ml @  62.5 mls/hr 1X  ONCE IV  Last administered on 6/19/20at 

11:09;  Start 6/19/20 at 11:00;  Stop 6/19/20 at 14:59;  Status DC


Furosemide (Lasix) 20 mg 1X  ONCE IVP  Last administered on 6/19/20at 14:52;  

Start 6/19/20 at 10:45;  Stop 6/19/20 at 10:49;  Status DC


Potassium Acetate 60 meq/Magnesium Sulfate 14 meq/ Multivitamins 10 ml/Chromium/

Copper/Manganese/ Seleni/Zn 1 ml/ Insulin Human Regular 15 unit/ Total 

Parenteral Nutrition/Amino Acids/Dextrose/ Fat Emulsion Intravenous 1,920 ml @  

80 mls/hr TPN  CONT IV  Last administered on 6/19/20at 22:08;  Start 6/19/20 at 

22:00;  Stop 6/20/20 at 21:59;  Status DC


Potassium Acetate 60 meq/Magnesium Sulfate 14 meq/ Multivitamins 10 ml/Chromium/

Copper/Manganese/ Seleni/Zn 1 ml/ Insulin Human Regular 15 unit/ Total 

Parenteral Nutrition/Amino Acids/Dextrose/ Fat Emulsion Intravenous 1,920 ml @  

80 mls/hr TPN  CONT IV  Last administered on 6/20/20at 22:12;  Start 6/20/20 at 

22:00;  Stop 6/21/20 at 21:59;  Status DC


Potassium Acetate 60 meq/Magnesium Sulfate 14 meq/ Multivitamins 10 ml/Chromium/

Copper/Manganese/ Seleni/Zn 1 ml/ Insulin Human Regular 15 unit/ Total 

Parenteral Nutrition/Amino Acids/Dextrose/ Fat Emulsion Intravenous 1,920 ml @  

80 mls/hr TPN  CONT IV  Last administered on 6/21/20at 22:22;  Start 6/21/20 at 

22:00;  Stop 6/22/20 at 21:59;  Status DC


Furosemide (Lasix) 20 mg 1X  ONCE IVP  Last administered on 6/22/20at 11:07;  

Start 6/22/20 at 10:30;  Stop 6/22/20 at 10:34;  Status DC


Potassium Acetate 60 meq/Magnesium Sulfate 14 meq/ Multivitamins 10 ml/Chromium/

Copper/Manganese/ Seleni/Zn 1 ml/ Insulin Human Regular 15 unit/ Sodium Chloride

20 meq/Total Parenteral Nutrition/Amino Acids/Dextrose/ Fat Emulsion Intravenous

1,920 ml @  80 mls/hr TPN  CONT IV  Last administered on 6/22/20at 21:54;  Start

6/22/20 at 22:00;  Stop 6/23/20 at 21:59;  Status DC


Potassium Acetate 30 meq/Magnesium Sulfate 14 meq/ Multivitamins 10 ml/Chromium/

Copper/Manganese/ Seleni/Zn 1 ml/ Insulin Human Regular 15 unit/ Sodium Chloride

20 meq/Potassium Chloride 30 meq/ Total Parenteral Nutrition/Amino 

Acids/Dextrose/ Fat Emulsion Intravenous 1,920 ml @  80 mls/hr TPN  CONT IV  

Last administered on 6/23/20at 21:46;  Start 6/23/20 at 22:00;  Stop 6/24/20 at 

21:59





Active Scripts


Active


Reported


Bisoprolol Fumarate 5 Mg Tablet 10 Mg PO DAILY


Vitals/I & O





Vital Sign - Last 24 Hours








 6/23/20 6/23/20 6/23/20 6/23/20





 10:02 11:00 12:00 12:00


 


Temp   98.9 





   98.9 


 


Pulse 110 116 110 


 


Resp 22 22 22 


 


B/P (MAP) 95/71 (79) 113/80 (91)  


 


Pulse Ox 98 98 98 


 


O2 Delivery Tracheal Collar Tracheal Collar Tracheal Collar Trach Collar


 


    





    





 6/23/20 6/23/20 6/23/20 6/23/20





 12:27 13:00 14:00 15:00


 


Pulse  124 118 116


 


Resp  22 20 26


 


B/P (MAP)  140/90 (107) 110/64 (79) 109/61 (77)


 


Pulse Ox  98 98 98


 


O2 Delivery Tracheal Collar Tracheal Collar Tracheal Collar Tracheal Collar


 


O2 Flow Rate 8.0   





 6/23/20 6/23/20 6/23/20 6/23/20





 15:26 16:00 16:00 17:00


 


Temp   99.1 





   99.1 


 


Pulse   105 111


 


Resp   23 22


 


B/P (MAP)   105/66 (79) 113/71 (85)


 


Pulse Ox   97 98


 


O2 Delivery Tracheal Collar Trach Collar Tracheal Collar Tracheal Collar


 


O2 Flow Rate 8.0   


 


    





    





 6/23/20 6/23/20 6/23/20 6/23/20





 18:00 19:00 19:56 20:00


 


Pulse 110 111  


 


Resp 18 28  


 


B/P (MAP) 114/67 (83) 113/72 (86)  


 


Pulse Ox 98 94 93 


 


O2 Delivery Tracheal Collar Tracheal Collar Tracheal Collar Trach Collar


 


O2 Flow Rate   8.0 





 6/23/20 6/23/20 6/23/20 6/23/20





 20:00 21:00 22:00 23:00


 


Temp 97.2   





 97.2   


 


Pulse 110 105 108 106


 


Resp 20 20 30 19


 


B/P (MAP) 125/81 (96) 116/72 (87) 94/56 (69) 114/67 (83)


 


Pulse Ox 96 99 88 100


 


O2 Delivery Tracheal Collar Tracheal Collar Tracheal Collar Tracheal Collar


 


    





    





 6/23/20 6/23/20 6/23/20 6/24/20





 23:29 23:30 23:59 00:00


 


Resp 20  20 


 


Pulse Ox 93 99 99 


 


O2 Delivery  Tracheal Collar Tracheal Collar Trach Collar


 


O2 Flow Rate 8.0 8.0 8.0 





 6/24/20 6/24/20 6/24/20 6/24/20





 00:01 01:00 02:00 03:00


 


Temp 97.7   





 97.7   


 


Pulse 103 107 124 119


 


Resp 19 21 27 24


 


B/P (MAP) 94/58 (70) 102/69 (80) 158/95 (116) 105/75 (85)


 


Pulse Ox 100 98 98 98


 


O2 Delivery Tracheal Collar Tracheal Collar Tracheal Collar Tracheal Collar


 


    





    





 6/24/20 6/24/20 6/24/20 6/24/20





 04:00 04:00 04:00 05:00


 


Temp  99.9  





  99.9  


 


Pulse  114  124


 


Resp  24  29


 


B/P (MAP)  103/71 (82)  106/64 (78)


 


Pulse Ox 99 100  87


 


O2 Delivery Tracheal Collar Tracheal Collar Trach Collar Tracheal Collar


 


O2 Flow Rate 8.0   


 


    





    





 6/24/20 6/24/20 6/24/20 6/24/20





 06:00 07:00 07:25 08:00


 


Pulse 122 122  125


 


Resp 29 21  22


 


B/P (MAP) 109/63 (78) 97/82 (87)  113/76 (88)


 


Pulse Ox 87 99 98 98


 


O2 Delivery Tracheal Collar Tracheal Collar Tracheal Collar Tracheal Collar


 


O2 Flow Rate   8.0 





 6/24/20   





 08:00   


 


O2 Delivery Trach Collar   














Intake and Output   


 


 6/23/20 6/23/20 6/24/20





 15:00 23:00 07:00


 


Intake Total  1503 ml 228.4 ml


 


Output Total 510 ml 735 ml 960 ml


 


Balance -510 ml 768 ml -731.6 ml











Hemodynamically unstable?:  No


Is patient in severe pain?:  No


Is NPO status required?:  No











OVIDIO SARAVIA MD          Jun 24, 2020 09:45

## 2020-06-25 VITALS — DIASTOLIC BLOOD PRESSURE: 58 MMHG | SYSTOLIC BLOOD PRESSURE: 100 MMHG

## 2020-06-25 VITALS — SYSTOLIC BLOOD PRESSURE: 111 MMHG | DIASTOLIC BLOOD PRESSURE: 64 MMHG

## 2020-06-25 VITALS — SYSTOLIC BLOOD PRESSURE: 105 MMHG | DIASTOLIC BLOOD PRESSURE: 59 MMHG

## 2020-06-25 VITALS — SYSTOLIC BLOOD PRESSURE: 132 MMHG | DIASTOLIC BLOOD PRESSURE: 71 MMHG

## 2020-06-25 VITALS — DIASTOLIC BLOOD PRESSURE: 64 MMHG | SYSTOLIC BLOOD PRESSURE: 105 MMHG

## 2020-06-25 RX ADMIN — IPRATROPIUM BROMIDE AND ALBUTEROL SULFATE SCH ML: .5; 3 SOLUTION RESPIRATORY (INHALATION) at 04:00

## 2020-06-25 RX ADMIN — INSULIN LISPRO SCH UNITS: 100 INJECTION, SOLUTION INTRAVENOUS; SUBCUTANEOUS at 06:00

## 2020-06-25 RX ADMIN — INSULIN LISPRO SCH UNITS: 100 INJECTION, SOLUTION INTRAVENOUS; SUBCUTANEOUS at 17:43

## 2020-06-25 RX ADMIN — CIPROFLOXACIN SCH MLS/HR: 2 INJECTION, SOLUTION INTRAVENOUS at 08:27

## 2020-06-25 RX ADMIN — IPRATROPIUM BROMIDE AND ALBUTEROL SULFATE SCH ML: .5; 3 SOLUTION RESPIRATORY (INHALATION) at 00:00

## 2020-06-25 RX ADMIN — Medication PRN EACH: at 10:29

## 2020-06-25 RX ADMIN — PANTOPRAZOLE SODIUM SCH MG: 40 INJECTION, POWDER, FOR SOLUTION INTRAVENOUS at 08:27

## 2020-06-25 RX ADMIN — IPRATROPIUM BROMIDE AND ALBUTEROL SULFATE SCH ML: .5; 3 SOLUTION RESPIRATORY (INHALATION) at 20:04

## 2020-06-25 RX ADMIN — INSULIN LISPRO SCH UNITS: 100 INJECTION, SOLUTION INTRAVENOUS; SUBCUTANEOUS at 12:00

## 2020-06-25 RX ADMIN — DILTIAZEM HYDROCHLORIDE SCH MLS/HR: 5 INJECTION INTRAVENOUS at 08:27

## 2020-06-25 RX ADMIN — IPRATROPIUM BROMIDE AND ALBUTEROL SULFATE SCH ML: .5; 3 SOLUTION RESPIRATORY (INHALATION) at 11:36

## 2020-06-25 RX ADMIN — IPRATROPIUM BROMIDE AND ALBUTEROL SULFATE SCH ML: .5; 3 SOLUTION RESPIRATORY (INHALATION) at 16:01

## 2020-06-25 RX ADMIN — INSULIN LISPRO SCH UNITS: 100 INJECTION, SOLUTION INTRAVENOUS; SUBCUTANEOUS at 00:00

## 2020-06-25 RX ADMIN — IPRATROPIUM BROMIDE AND ALBUTEROL SULFATE SCH ML: .5; 3 SOLUTION RESPIRATORY (INHALATION) at 08:05

## 2020-06-25 NOTE — PDOC
PROGRESS NOTES


Chief Complaint


Chief Complaint


impression =================











History of Present Illness


50yo F w/ PMHx HTN, prediabetes who presented the emergency room complaints of 

abdominal pain. Patient described off and on 3 days. She states is constant, 

described as a squeezing sensation in a band-like distribution. + nausea, 

vomiting.  She denies any fever or diarrhea.  Patient denies any abdominal 

surgical procedures.  She stateed is worse with movements, car ride.  Pain 

initially was upper abdomen however now pretty much generalized.  Last bowel 

movement was 3/15/2020. Nothing makes her pain better.  Patient denies any 

shortness of breath.  She does state the pain moves into her chest.  Denies any 

headache or visual changes.


Lipase [id number], , , Bilirubin 1.4.


CT abdomen confirms pancreatic inflammation, peripancreatic fluid and 

inflammatory changes around the pancreas consistent with pancreatitis. 

Cholelithiasis and 1.4cm uterine fibroid as well as possible left salpingitis. 

Admitted for further care


Gallstone pancreatitis with necrosis. 


   -CT A/P 6/6 showed multiple pseudocysts, slight larger on the right. s/p 

drains x 3, 6/7.  + PSAE (MDRO-R Cefepime, Zosyn ANSON < 64) and yeast, 


   -s/p drain 4/27. C. parapsilosis. s/p drain 5/6 + yeast & high amylase; s/p 

additional drain on 5/8. Drains removed. 


Ascites s/p paracentesis 4/15 & 5/6. C. parapsilosis 


JED. off HD. 





impression ============================











Acute hypoxic Respiratory failure required  mechanical ventilation


Tracheostomy


bilateral pleural effusions/pulm edema s/p Throacentesis on 6/15/2020


Severe Acute gallstone pancreatitis (not a surgical candidate at this time) with

necrosis


Acute kidney failure now requiring dialysis


Gallstones (Calculus of gallbladder with acute cholecystitis without 

obstruction)


HTN 


Intractable pain


Intractable nausea


Covid 19 negative. 


Acute on chronic anemia 


EEG: No seizure activityFever  - better currently - intermittent could be from 

underlying pancreatitis blood cults 5/4 - neg so far


? Ileus with vomiting


Abd distention - U/S and CT reviewed s/p 0.4 L of opaque, debris-containing 

ascites was removed 5/6


Acute pancreatitis with persistent necrosis


Gallstone pancreatitis with necrosis. 


   -CT A/P 6/6 showed multiple pseudocysts, slight larger on the right. s/p 

drains x 3, 6/7.  + PSAE (MDRO-R Cefepime, Zosyn ANSON < 64) and yeast, 


   -s/p drain 4/27. C. parapsilosis. s/p drain 5/6 + yeast & high amylase; s/p 

additional drain on 5/8. Drains removed. 


Ascites s/p paracentesis 4/15 & 5/6. C. parapsilosis 


JED. off HD. 


A large fluid collection in the pancreatic bed has slightly decreased in size, 

described below, the pancreas itself is difficult to  visualize, which could be 

due to necrosis or obscuration of pancreatic  parenchyma from the surrounding 

fluid collection.6/15 


- 4/27 status post ROBERT drain placement + C paropsilosis. s/p additional drains 

5/8


Anemia - S/p PRBCs


Cholelithiasis with thickening of the gallbladder wall.


Leucocytosis improving


JED, hyperkalemia, Metabolic acidosis off dialysis


hypocalcemia 


Prediabetes


HTN


s/p trach


ESRD on HD


Hyperglycemia


severe protein-caloric malnutrition


Moderate to large left pleural effusion with atelectasis and collapse  of most 

of the left lower lobe, stable








6/18 FEVER 101, BLOOD CULTURE X 2 


6/19  iv cipro added


6/20  vent fio2 35% 





6/24 tentative surgery next week 


6/25  











FEN - 





PPX - SCDs, off lovenox currently, high risk for thrombosis but in light of her 

recent anemia and need for transfusion the risks do not outweigh benefits at 

this time. will continue to evaluate on a daily basis


FULL CODE


Dispo - ICU, critically ill


BACK ON VENT 6/17  vent // no distress


iv albumin 6/19


Continue meropenem, has MDRO PSAE June 7 


Continue micafungin June 7 6/24 surgery plans with Dr Flores--tentatively next week














37 MIN CC TIME





History of Present Illness


History of Present Illness


6/8: IR placed drain on 6/7. 4u PRBC after Hb drop. Hb 8.8 today. Off Levophed 

this morning. T-max 100.3. Much more lethargic today. CXR with left sided 

diffuse infiltrates.


6/9: Tachycardic overnight into the 140s. NGT clamped. On BIPAP currently. 

Drains with serosanguinous discharge. WBC 8, Tmax 99.6F.


6/10: Seen on trach shield in ICU.  Hypertensive and tachycardic. Labs stable. 

blood stained drainage from drains. Afebrile.


6/11: Seen on trach shield in ICU. She is a bit confused, drowsy, but when 

sitting up is conversational and confusion somewhat clears. She is asking for 

more pain medication. Stable drains, still very tachy.Na 147


6/12: Patient vomited overnight. Aspirated. Tried to pull her trach out, she was

told she would die without her trach, she said "I know, I just want to go home".

Hb 7.6. Afebrile, still very tachycardic. 1055ml out of right sided ROBERT drain


6/13: Overnight hypoxic, on BIPAP. CXR with left sided white out lung. 

Significant mucous plug suctioned by RT with improvement in her ABG after 2 

hours this morning. Not really active, tired, lethargic. 890ml out of drains 

past 24 hours. On vent. D/w daughter bedside.


6/14: Still on vent overnight with copious pulmonary drainage on suctioning. 

Labs stable, UOP stable 1L. Drain output still significant with 1505mL. She has 

shown her phone password [id number] and made it clear she does not want her daughters 

to access her phone at this time.


6:15: Patient quite frail, she has had such a lengthy hospital stay that she is 

certainly depressed and as documented 3 days ago is wanting to go home. Most 

likely patient is also tired of being hospitalized with an end point no where in

sight. Reassurance and encouragement provided during my visit. Plans for 

thoracentesis later in the day 


6/16: No acute events reported overnight, case discussed with nursing staff 

patient in no acute distress, complaints of the abdomimal pain during my visit, 

patient is quite depressed, reassurance has been provided, discussed with 

nursing staff at bedside, replace electrolytes 


6/17 off vent / on TS , no distress








6/25 /2020





Patient seen and examined ICU BED


guarded-long-term prognosis 


Sputum from 6/13 - growing PSA - may be colonization I to Meropenem add Cipro


Continue meropenem, has MDRP PSAE June 7 from abd


cont micafungin June 7


bleeding from Sx. site RLQ and firmness)stable


oncology consulted   


Cholelithiasis. Mild thickened appearance of the gallbladder wall. sono 6/25  

Mild right hydronephrosis.Fluid collection identified anterior to the pancreas. 

   


ct drainage 200 cc/24 hrs


d/c antibiotics , observe  6/25     


cxr pending 6/25     


  


      











36 MIN CC TIME








Date:  Apr 27, 2020





Pre-Op Diagnosis:


Necrotizing pancreatitis





Post-Op Diagnosis:


same





Procedure Performed:


laparoscopic exploration





Surgeon:


Frandy Flores


Asst:  Dr. Luis Santos





Anesthesia Type:


GETA plus local





Blood Loss:


50





Specimans Obtained:


cultures, debris





Findings:


1000 cc ascites suctioned off, cultures sent, diffuse debris, obliteration of 

surgical planes preventing any meaningful exploration




















 


 





 








6/1/2020


Patient seen and examined in the ICU


She appears extremely ill


She is tachypneic at 35 respirations per minute and tachycardic at 132 bpm


She is extremely encephalopathic and shaky


She appears clammy


Chart reviewed


Discussed with RN


Prognosis extremely guarded at best








5/31/2020


Patient still in ICU


Resting with no apparent distress


Chart reviewed








5/30/2020


Patient seen and examined in the ICU


She is wiping her face with a cough


Discussed with RN


Chart reviewed


We hope to get her out of the ICU later today if possible








5/29/2020


Patient seen and examined in the ICU once again


She is back on NG suction


On IV Zosyn


Has IV TPN


Sedated with Precedex but anxious still


Appears somewhat clammy and pale


Chart reviewed


Discussed with RN


She remains critically ill











 


 


BRIEF OPERATIVE NOTE


Pre-Op Diagnosis


Pancreatitis with pseudocysts, suspected infection


Post-Op Diagnosis


same


Procedure Performed


CT abdominal Drains x 3


Surgeon


Hardy


Anesthesia Type:  Conscious Sedation


Findings


3 abdominal drains, 14F, with turbid pancreatic fluid and necrotic debris in 

each.


Complications


No immediate








5/9: Patient today somewhat restless and having bilious secretions from ET tube,

imaging studies ordered, discussed with consultant. Pretty poor prognosis, 

hopefully is not a fistula, poor surgical candidate. 





5/10: Imaging with no acute events, she seems more stable today compared to 

yesterday. Encouraged as much activity as possible patient at high risk for 

severe depression.





Vitals


Vitals





Vital Signs








  Date Time  Temp Pulse Resp B/P (MAP) Pulse Ox O2 Delivery O2 Flow Rate FiO2


 


6/25/20 08:19     97 Tracheal Collar 8.0 


 


6/25/20 08:00 98.8 121 18 105/59 (74)    





 98.8       











Physical Exam


Physical Exam


GENERAL: Propped up in bed, resting quietly  watching tv


HEENT: 8 liters nc


NECK:  Tracheostomy 


LUNGS: Diminished aeration bases, no accessory muscle use - CT on left with 

clear fluid


HEART:  S1, S2,regular 


ABDOMEN: Mild distention, bowel sounds present, soft, grimaces to palpation, 

drains x 3


: [place] ( 6/7)


EXTREMITIES: + edema BLE


SKIN: no signs of gen rash 


LUE-PICC  without signs of complications


General:  Alert, Oriented X3, Cooperative, No acute distress


Heart:  Regular rate (SR/ST), Other (distant heart sounds)


Lungs:  Other (diminshed in bases, Rhonci in LLL)


Abdomen:  Soft


Extremities:  No cyanosis, Other (3+ bilateral LE pitting edema)


Skin:  No rashes, No significant lesion





Labs


LABS





Single AP view of the chest.


 


Comparison:  6/18/2020.


 


Indication: Follow-up effusions


 


Findings: 


 


Tracheostomy tube nasogastric tube and left-sided PICC line are 


identified. The heart is enlarged but stable.. No pneumothorax. Left 


basilar chest tube is reidentified. No significant effusion is seen on the


left side. There does appear to be increasing effusion on the right side 


with new atelectasis and effusion identified. Breast


 


Impression: 


 


1. Chest tube is seen in the left pleural base with no significant 


left-sided effusion. There does appear to be an increasing right-sided 


effusion with compressive atelectasis.


 


 


Electronically signed by: Gaurav Michel MD (6/22/2020 9:20 AM) [id number]














DICTATED and SIGNED BY:     GAURAV MICHEL MD


DATE:     06/22/20 0920














Examination: Ultrasound abdomen complete


 


HISTORY: History of pancreatitis


 


COMPARISON: 5/4/2020


 


FINDINGS:


 


The aorta, IVC and pancreas are not well-visualized due to bowel gas. 


Fluid collection identified anterior to the pancreas. Increased 


echogenicity identified in the liver likely hepatic steatosis. Gallstones 


identified within the gallbladder. The gallbladder wall thickness measures


4 mm. The common bile duct measures 4 mm in diameter.


 


Right kidney measures 12.9 cm in length. Mild right-sided hydronephrosis.


 


Mild ascites. Probable small right pleural effusion.


 


 


 


IMPRESSION:


 


1. Cholelithiasis. Mild thickened appearance of the gallbladder wall.


 


2. Mild right hydronephrosis.


 


3. Fluid collection identified anterior to the pancreas. 


 


4. Hepatic steatosis.


 


Electronically signed by: Logan Duran MD (6/25/2020 6:53 AM) [id number]














DICTATED and SIGNED BY:     LOGAN DURAN MD


DATE:     06/25/20 0653





Laboratory Tests








Test


 6/24/20


18:04 6/25/20


01:01 6/25/20


06:16


 


Glucose (Fingerstick)


 172 mg/dL


(70-99) 134 mg/dL


(70-99) 123 mg/dL


(70-99)











Assessment and Plan


Assessmemt and Plan


Problems


Medical Problems:


(1) Acute pancreatitis


Status: Acute  





(2) Cholelithiasis


Status: Acute  











Comment


Review of Relevant


I have reviewed the following items josy (where applicable) has been applied.


Labs





Laboratory Tests








Test


 6/23/20


12:22 6/23/20


17:01 6/24/20


00:36 6/24/20


06:15


 


Glucose (Fingerstick)


 156 mg/dL


(70-99) 157 mg/dL


(70-99) 141 mg/dL


(70-99) 





 


White Blood Count


 


 


 


 13.2 x10^3/uL


(4.0-11.0)


 


Red Blood Count


 


 


 


 3.41 x10^6/uL


(3.50-5.40)


 


Hemoglobin


 


 


 


 9.4 g/dL


(12.0-15.5)


 


Hematocrit


 


 


 


 29.0 %


(36.0-47.0)


 


Mean Corpuscular Volume    85 fL () 


 


Mean Corpuscular Hemoglobin    28 pg (25-35) 


 


Mean Corpuscular Hemoglobin


Concent 


 


 


 33 g/dL


(31-37)


 


Red Cell Distribution Width


 


 


 


 17.5 %


(11.5-14.5)


 


Platelet Count


 


 


 


 449 x10^3/uL


(140-400)


 


Neutrophils (%) (Auto)    81 % (31-73) 


 


Lymphocytes (%) (Auto)    11 % (24-48) 


 


Monocytes (%) (Auto)    6 % (0-9) 


 


Eosinophils (%) (Auto)    1 % (0-3) 


 


Basophils (%) (Auto)    0 % (0-3) 


 


Neutrophils # (Auto)


 


 


 


 10.8 x10^3/uL


(1.8-7.7)


 


Lymphocytes # (Auto)


 


 


 


 1.5 x10^3/uL


(1.0-4.8)


 


Monocytes # (Auto)


 


 


 


 0.8 x10^3/uL


(0.0-1.1)


 


Eosinophils # (Auto)


 


 


 


 0.1 x10^3/uL


(0.0-0.7)


 


Basophils # (Auto)


 


 


 


 0.0 x10^3/uL


(0.0-0.2)


 


Prothrombin Time


 


 


 


 14.0 SEC


(11.7-14.0)


 


Prothromb Time International


Ratio 


 


 


 1.1 (0.8-1.1) 





 


Sodium Level


 


 


 


 135 mmol/L


(136-145)


 


Potassium Level


 


 


 


 4.3 mmol/L


(3.5-5.1)


 


Chloride Level


 


 


 


 99 mmol/L


()


 


Carbon Dioxide Level


 


 


 


 34 mmol/L


(21-32)


 


Anion Gap    2 (6-14) 


 


Blood Urea Nitrogen


 


 


 


 13 mg/dL


(7-20)


 


Creatinine


 


 


 


 0.7 mg/dL


(0.6-1.0)


 


Estimated GFR


(Cockcroft-Gault) 


 


 


 88.9 





 


BUN/Creatinine Ratio    19 (6-20) 


 


Glucose Level


 


 


 


 130 mg/dL


(70-99)


 


Calcium Level


 


 


 


 10.4 mg/dL


(8.5-10.1)


 


Phosphorus Level


 


 


 


 4.2 mg/dL


(2.6-4.7)


 


Magnesium Level


 


 


 


 2.1 mg/dL


(1.8-2.4)


 


Total Bilirubin


 


 


 


 0.4 mg/dL


(0.2-1.0)


 


Aspartate Amino Transf


(AST/SGOT) 


 


 


 23 U/L (15-37) 





 


Alanine Aminotransferase


(ALT/SGPT) 


 


 


 20 U/L (14-59) 





 


Alkaline Phosphatase


 


 


 


 255 U/L


()


 


Total Protein


 


 


 


 4.8 g/dL


(6.4-8.2)


 


Albumin


 


 


 


 1.1 g/dL


(3.4-5.0)


 


Albumin/Globulin Ratio    0.3 (1.0-1.7) 


 


Test


 6/24/20


06:20 6/24/20


18:04 6/25/20


01:01 6/25/20


06:16


 


Glucose (Fingerstick)


 133 mg/dL


(70-99) 172 mg/dL


(70-99) 134 mg/dL


(70-99) 123 mg/dL


(70-99)








Laboratory Tests








Test


 6/24/20


18:04 6/25/20


01:01 6/25/20


06:16


 


Glucose (Fingerstick)


 172 mg/dL


(70-99) 134 mg/dL


(70-99) 123 mg/dL


(70-99)








Microbiology


6/18/20 Blood Culture - Final, Complete


          NO GROWTH AFTER 5 DAYS


6/15/20 Gram Stain - Final, Complete


          


6/15/20 Aerobic and Anaerobic Culture - Final, Complete


          


6/13/20 Gram Stain Evaluation - Final, Complete


          


6/13/20 Respiratory Culture - Final, Complete


          


6/13/20 Antimicrobic Susceptibility - Final, Complete


          


6/7/20 Urine Culture - Final, Complete


         


5/30/20 Gram Stain - Final, Complete


          


5/30/20 Aerobic Culture - Final, Complete


Medications





Current Medications


Sodium Chloride 1,000 ml @  1,000 mls/hr Q1H IV  Last administered on 3/16/20at 

03:00;  Start 3/16/20 at 03:00;  Stop 3/16/20 at 03:59;  Status DC


Ondansetron HCl (Zofran) 4 mg 1X  ONCE IVP  Last administered on 3/16/20at 

03:27;  Start 3/16/20 at 03:00;  Stop 3/16/20 at 03:01;  Status DC


Morphine Sulfate (Morphine Sulfate) 4 mg 1X  ONCE IV ;  Start 3/16/20 at 03:00; 

Stop 3/16/20 at 03:01;  Status Cancel


Ketorolac Tromethamine (Toradol 30mg Vial) 30 mg 1X  ONCE IV  Last administered 

on 3/16/20at 02:54;  Start 3/16/20 at 03:00;  Stop 3/16/20 at 03:01;  Status DC


Fentanyl Citrate (Fentanyl 2ml Vial) 25 mcg 1X  ONCE IVP  Last administered on 

3/16/20at 03:23;  Start 3/16/20 at 03:30;  Stop 3/16/20 at 03:31;  Status DC


Fentanyl Citrate (Fentanyl 2ml Vial) 100 mcg STK-MED ONCE .ROUTE ;  Start 

3/16/20 at 03:18;  Stop 3/16/20 at 03:18;  Status DC


Iohexol (Omnipaque 350 Mg/ml) 90 ml 1X  ONCE IV  Last administered on 3/16/20at 

03:25;  Start 3/16/20 at 03:30;  Stop 3/16/20 at 03:31;  Status DC


Info (CONTRAST GIVEN -- Rx MONITORING) 1 each PRN DAILY  PRN MC SEE COMMENTS;  

Start 3/16/20 at 03:30;  Stop 3/18/20 at 03:29;  Status DC


Hydromorphone HCl (Dilaudid) 0.5 mg 1X  ONCE IV  Last administered on 3/16/20at 

03:55;  Start 3/16/20 at 04:30;  Stop 3/16/20 at 04:32;  Status DC


Ondansetron HCl (Zofran) 4 mg PRN Q8HRS  PRN IV NAUSEA/VOMITING 1ST CHOICE;  

Start 3/16/20 at 05:00;  Stop 3/16/20 at 09:27;  Status DC


Morphine Sulfate (Morphine Sulfate) 2 mg PRN Q2HR  PRN IV SEVERE PAIN 7-10 Last 

administered on 3/17/20at 12:26;  Start 3/16/20 at 05:00;  Stop 3/17/20 at 

14:15;  Status DC


Sodium Chloride 1,000 ml @  125 mls/hr Q8H IV  Last administered on 3/16/20at 

20:56;  Start 3/16/20 at 05:00;  Stop 3/17/20 at 04:59;  Status DC


Hydromorphone HCl (Dilaudid) 0.5 mg PRN Q3HRS  PRN IV SEVERE PAIN 7-10 Last 

administered on 3/17/20at 10:06;  Start 3/16/20 at 05:00;  Stop 3/17/20 at 

12:01;  Status DC


Piperacillin Sod/ Tazobactam Sod 4.5 gm/Sodium Chloride 100 ml @  200 mls/hr 1X 

ONCE IV  Last administered on 3/16/20at 05:44;  Start 3/16/20 at 06:00;  Stop 

3/16/20 at 06:29;  Status DC


Ondansetron HCl (Zofran) 4 mg PRN Q4HRS  PRN IV NAUSEA/VOMITING 1ST CHOICE Last 

administered on 6/23/20at 10:32;  Start 3/16/20 at 09:30


Insulin Human Lispro (HumaLOG) 0-9 UNITS Q6HRS SQ  Last administered on 

6/24/20at 18:05;  Start 3/16/20 at 09:30


Dextrose (Dextrose 50%-Water Syringe) 12.5 gm PRN Q15MIN  PRN IV SEE COMMENTS;  

Start 3/16/20 at 09:30


Pantoprazole Sodium (PROTONIX VIAL for IV PUSH) 40 mg DAILYAC IVP  Last 

administered on 6/25/20at 08:27;  Start 3/16/20 at 11:30


Prochlorperazine Edisylate (Compazine) 10 mg PRN Q6HRS  PRN IV NAUSEA/VOMITING, 

2nd CHOICE Last administered on 6/24/20at 11:56;  Start 3/16/20 at 17:45


Atenolol (Tenormin) 100 mg DAILY PO ;  Start 3/17/20 at 09:00;  Stop 3/16/20 at 

20:08;  Status DC


Metoprolol Tartrate (Lopressor Vial) 2.5 mg Q6HRS IVP  Last administered on 

3/17/20at 05:51;  Start 3/16/20 at 20:15;  Stop 3/17/20 at 10:02;  Status DC


Metoprolol Tartrate (Lopressor Vial) 5 mg Q6HRS IVP  Last administered on 

3/26/20at 00:12;  Start 3/17/20 at 10:15;  Stop 3/28/20 at 08:48;  Status DC


Hydromorphone HCl (Dilaudid) 1 mg PRN Q3HRS  PRN IV SEVERE PAIN 7-10 Last 

administered on 3/23/20at 05:13;  Start 3/17/20 at 12:00;  Stop 3/31/20 at 

00:25;  Status DC


Lidocaine HCl (Buffered Lidocaine 1%) 3 ml STK-MED ONCE .ROUTE ;  Start 3/17/20 

at 12:55;  Stop 3/17/20 at 12:56;  Status DC


Albumin Human 500 ml @  125 mls/hr 1X  ONCE IV  Last administered on 3/17/20at 

14:33;  Start 3/17/20 at 14:30;  Stop 3/17/20 at 18:32;  Status DC


Norepinephrine Bitartrate 8 mg/ Dextrose 258 ml @  17.299 mls/ hr CONT  PRN IV 

PER PROTOCOL Last administered on 4/14/20at 12:48;  Start 3/17/20 at 15:30;  

Stop 4/17/20 at 09:19;  Status DC


Sodium Chloride 1,000 ml @  125 mls/hr Q8H IV  Last administered on 3/17/20at 

21:04;  Start 3/17/20 at 16:00;  Stop 3/18/20 at 02:42;  Status DC


Albumin Human 500 ml @  125 mls/hr PRN BID  PRN IV After every 2L NSS & BP < 

90mm Last administered on 6/6/20at 11:40;  Start 3/17/20 at 16:00


Iohexol (Omnipaque 300 Mg/ml) 60 ml 1X  ONCE IV  Last administered on 3/17/20at 

17:20;  Start 3/17/20 at 17:00;  Stop 3/17/20 at 17:01;  Status DC


Info (CONTRAST GIVEN -- Rx MONITORING) 1 each PRN DAILY  PRN MC SEE COMMENTS;  

Start 3/17/20 at 17:00;  Stop 3/19/20 at 16:59;  Status DC


Meropenem 1 gm/ Sodium Chloride 100 ml @  200 mls/hr Q8HRS IV  Last administered

on 3/18/20at 05:45;  Start 3/17/20 at 20:00;  Stop 3/18/20 at 08:48;  Status DC


Furosemide (Lasix) 40 mg 1X  ONCE IVP  Last administered on 3/17/20at 22:12;  

Start 3/17/20 at 22:30;  Stop 3/17/20 at 22:31;  Status DC


Calcium Chloride 1000 mg/Sodium Chloride 110 ml @  220 mls/hr 1X  ONCE IV  Last 

administered on 3/17/20at 22:11;  Start 3/17/20 at 22:30;  Stop 3/17/20 at 

22:59;  Status DC


Albuterol Sulfate (Ventolin Neb Soln) 2.5 mg 1X  ONCE NEB  Last administered on 

3/18/20at 00:56;  Start 3/17/20 at 22:30;  Stop 3/17/20 at 22:31;  Status DC


Insulin Human Regular (HumuLIN R VIAL) 5 unit 1X  ONCE IV  Last administered on 

3/17/20at 22:14;  Start 3/17/20 at 22:30;  Stop 3/17/20 at 22:31;  Status DC


Magnesium Sulfate 50 ml @ 25 mls/hr 1X  ONCE IV  Last administered on 3/18/20at 

02:57;  Start 3/18/20 at 03:00;  Stop 3/18/20 at 04:59;  Status DC


Calcium Gluconate 1000 mg/Sodium Chloride 110 ml @  220 mls/hr 1X  ONCE IV  Last

administered on 3/18/20at 02:46;  Start 3/18/20 at 03:00;  Stop 3/18/20 at 

03:29;  Status DC


Sodium Chloride 1,000 ml @  200 mls/hr Q5H IV  Last administered on 3/18/20at 

02:46;  Start 3/18/20 at 03:00;  Stop 3/18/20 at 10:21;  Status DC


Calcium Gluconate 1000 mg/Sodium Chloride 110 ml @  220 mls/hr 1X  ONCE IV  Last

administered on 3/18/20at 03:21;  Start 3/18/20 at 03:30;  Stop 3/18/20 at 

03:59;  Status DC


Sodium Bicarbonate 50 meq/Sodium Chloride 1,050 ml @  75 mls/hr Q14H IV  Last 

administered on 3/22/20at 21:10;  Start 3/18/20 at 07:30;  Stop 3/23/20 at 

10:28;  Status DC


Calcium Gluconate 2000 mg/Sodium Chloride 120 ml @  220 mls/hr 1X  ONCE IV  Last

administered on 3/18/20at 09:05;  Start 3/18/20 at 07:30;  Stop 3/18/20 at 

08:02;  Status DC


Lidocaine HCl (Xylocaine-Mpf 1% 2ml Vial) 2 ml STK-MED ONCE .ROUTE ;  Start 

3/18/20 at 08:47;  Stop 3/18/20 at 08:47;  Status DC


Meropenem 500 mg/ Sodium Chloride 50 ml @  100 mls/hr Q12HR IV  Last 

administered on 3/23/20at 21:01;  Start 3/18/20 at 18:00;  Stop 3/24/20 at 

07:58;  Status DC


Lidocaine HCl (Buffered Lidocaine 1%) 3 ml STK-MED ONCE .ROUTE ;  Start 3/18/20 

at 09:46;  Stop 3/18/20 at 09:46;  Status DC


Lidocaine HCl (Buffered Lidocaine 1%) 6 ml 1X  ONCE INJ  Last administered on 

3/18/20at 10:26;  Start 3/18/20 at 10:15;  Stop 3/18/20 at 10:16;  Status DC


Info (Tpn Per Pharmacy) 1 each PRN DAILY  PRN MC SEE COMMENTS Last administered 

on 6/24/20at 09:46;  Start 3/18/20 at 12:00


Sodium Chloride 1,000 ml @  1,000 mls/hr Q1H PRN IV hypotension;  Start 3/18/20 

at 12:07;  Stop 3/18/20 at 18:06;  Status DC


Diphenhydramine HCl (Benadryl) 25 mg 1X PRN  PRN IV ITCHING;  Start 3/18/20 at 

12:15;  Stop 3/19/20 at 12:14;  Status DC


Diphenhydramine HCl (Benadryl) 25 mg 1X PRN  PRN IV ITCHING;  Start 3/18/20 at 

12:15;  Stop 3/19/20 at 12:14;  Status DC


Sodium Chloride 1,000 ml @  400 mls/hr Q2H30M PRN IV PATENCY;  Start 3/18/20 at 

12:07;  Stop 3/19/20 at 00:06;  Status DC


Info (PHARMACY MONITORING -- do not chart) 1 each PRN DAILY  PRN MC SEE 

COMMENTS;  Start 3/18/20 at 12:15;  Stop 3/20/20 at 08:13;  Status DC


Sodium Chloride 90 meq/Calcium Gluconate 10 meq/ Multivitamins 10 ml/Chromium/ 

Copper/Manganese/ Seleni/Zn 1 ml/ Total Parenteral Nutrition/Amino 

Acids/Dextrose/ Fat Emulsion Intravenous 55.005 ml  @ 2.292 mls/hr TPN  CONT IV 

;  Start 3/18/20 at 22:00;  Stop 3/18/20 at 12:33;  Status DC


Info (Tpn Per Pharmacy) 1 each PRN DAILY  PRN MC SEE COMMENTS;  Start 3/18/20 at

12:30;  Status UNV


Sodium Chloride 90 meq/Calcium Gluconate 10 meq/ Multivitamins 10 ml/Chromium/ 

Copper/Manganese/ Seleni/Zn 0.5 ml/ Total Parenteral Nutrition/Amino 

Acids/Dextrose/ Fat Emulsion Intravenous 1,512 ml @  63 mls/hr TPN  CONT IV  

Last administered on 3/18/20at 22:06;  Start 3/18/20 at 22:00;  Stop 3/19/20 at 

21:59;  Status DC


Calcium Carbonate/ Glycine (Tums) 500 mg PRN AFTMEALHC  PRN PO INDIGESTION;  

Start 3/18/20 at 17:45;  Stop 5/13/20 at 10:25;  Status DC


Calcium Gluconate (Calcium Gluconate) 2,000 mg 1X  ONCE IVP  Last administered 

on 3/19/20at 02:19;  Start 3/19/20 at 02:15;  Stop 3/19/20 at 02:16;  Status DC


Calcium Chloride 3000 mg/Sodium Chloride 1,030 ml @  50 mls/hr [id number] IV  Last 

administered on 3/21/20at 02:17;  Start 3/19/20 at 08:00;  Stop 3/21/20 at 

15:23;  Status DC


Lorazepam (Ativan Inj) 1 mg PRN Q4HRS  PRN IVP ANXIETY / AGITATION, 2nd choic 

Last administered on 4/17/20at 03:51;  Start 3/19/20 at 09:00;  Stop 4/17/20 at 

09:19;  Status DC


Sodium Chloride 1,000 ml @  1,000 mls/hr Q1H PRN IV hypotension;  Start 3/19/20 

at 08:56;  Stop 3/19/20 at 14:55;  Status DC


Albumin Human 200 ml @  200 mls/hr 1X PRN  PRN IV Hypotension;  Start 3/19/20 at

09:00;  Stop 3/19/20 at 14:59;  Status DC


Diphenhydramine HCl (Benadryl) 25 mg 1X PRN  PRN IV ITCHING;  Start 3/19/20 at 

09:00;  Stop 3/20/20 at 08:59;  Status DC


Diphenhydramine HCl (Benadryl) 25 mg 1X PRN  PRN IV ITCHING;  Start 3/19/20 at 

09:00;  Stop 3/20/20 at 08:59;  Status DC


Sodium Chloride 1,000 ml @  400 mls/hr Q2H30M PRN IV PATENCY;  Start 3/19/20 at 

08:56;  Stop 3/19/20 at 20:55;  Status DC


Info (PHARMACY MONITORING -- do not chart) 1 each PRN DAILY  PRN MC SEE 

COMMENTS;  Start 3/19/20 at 09:00;  Status UNV


Info (PHARMACY MONITORING -- do not chart) 1 each PRN DAILY  PRN MC SEE 

COMMENTS;  Start 3/19/20 at 09:00;  Stop 3/20/20 at 08:13;  Status DC


Digoxin (Lanoxin) 500 mcg 1X  ONCE IV  Last administered on 3/19/20at 10:04;  

Start 3/19/20 at 10:00;  Stop 3/19/20 at 10:01;  Status DC


Digoxin (Lanoxin) 125 mcg 1X  ONCE IV  Last administered on 3/19/20at 17:10;  

Start 3/19/20 at 18:00;  Stop 3/19/20 at 18:01;  Status DC


Magnesium Sulfate 100 ml @  25 mls/hr 1X  ONCE IV  Last administered on 

3/19/20at 12:48;  Start 3/19/20 at 13:00;  Stop 3/19/20 at 16:59;  Status DC


Sodium Chloride 90 meq/Magnesium Sulfate 10 meq/ Calcium Gluconate 20 meq/ 

Multivitamins 10 ml/Chromium/ Copper/Manganese/ Seleni/Zn 0.5 ml/ Total 

Parenteral Nutrition/Amino Acids/Dextrose/ Fat Emulsion Intravenous 1,512 ml @  

63 mls/hr TPN  CONT IV  Last administered on 3/19/20at 22:25;  Start 3/19/20 at 

22:00;  Stop 3/20/20 at 21:59;  Status DC


Sodium Chloride 1,000 ml @  1,000 mls/hr Q1H PRN IV hypotension;  Start 3/20/20 

at 08:05;  Stop 3/20/20 at 14:04;  Status DC


Albumin Human 200 ml @  200 mls/hr 1X  ONCE IV  Last administered on 3/20/20at 

08:57;  Start 3/20/20 at 08:15;  Stop 3/20/20 at 09:14;  Status DC


Diphenhydramine HCl (Benadryl) 25 mg 1X PRN  PRN IV ITCHING;  Start 3/20/20 at 

08:15;  Stop 3/21/20 at 08:14;  Status DC


Diphenhydramine HCl (Benadryl) 25 mg 1X PRN  PRN IV ITCHING;  Start 3/20/20 at 

08:15;  Stop 3/21/20 at 08:14;  Status DC


Sodium Chloride 1,000 ml @  400 mls/hr Q2H30M PRN IV PATENCY;  Start 3/20/20 at 

08:05;  Stop 3/20/20 at 20:04;  Status DC


Info (PHARMACY MONITORING -- do not chart) 1 each PRN DAILY  PRN MC SEE 

COMMENTS;  Start 3/20/20 at 08:15;  Stop 3/24/20 at 07:57;  Status DC


Sodium Chloride 90 meq/Potassium Chloride 15 meq/ Potassium Phosphate 10 mmol/ 

Magnesium Sulfate 10 meq/Calcium Gluconate 20 meq/ Multivitamins 10 ml/Chromium/

Copper/Manganese/ Seleni/Zn 0.5 ml/ Total Parenteral Nutrition/Amino 

Acids/Dextrose/ Fat Emulsion Intravenous 1,512 ml @  63 mls/hr TPN  CONT IV  

Last administered on 3/20/20at 21:01;  Start 3/20/20 at 22:00;  Stop 3/21/20 at 

21:59;  Status DC


Potassium Chloride/Water 100 ml @  100 mls/hr 1X  ONCE IV  Last administered on 

3/20/20at 14:09;  Start 3/20/20 at 14:00;  Stop 3/20/20 at 14:59;  Status DC


Benzocaine (Hurricaine One) 1 spray 1X  ONCE MM  Last administered on 3/20/20at 

16:38;  Start 3/20/20 at 14:30;  Stop 3/20/20 at 14:31;  Status DC


Lidocaine HCl (Glydo (Lidocaine) Jelly) 1 thomas 1X  ONCE MM  Last administered on 

3/20/20at 16:38;  Start 3/20/20 at 14:30;  Stop 3/20/20 at 14:31;  Status DC


Linezolid/Dextrose 300 ml @  300 mls/hr Q12HR IV  Last administered on 3/26/20at

21:04;  Start 3/20/20 at 20:00;  Stop 3/27/20 at 07:50;  Status DC


Acetaminophen (Tylenol) 650 mg PRN Q6HRS  PRN PO MILD PAIN / TEMP;  Start 

3/21/20 at 03:30;  Stop 3/21/20 at 03:36;  Status DC


Acetaminophen (Tylenol) 650 mg PRN Q6HRS  PRN PEG MILD PAIN / TEMP Last 

administered on 4/16/20at 19:56;  Start 3/21/20 at 03:36;  Stop 5/13/20 at 

10:25;  Status DC


Sodium Chloride 1,000 ml @  1,000 mls/hr Q1H PRN IV hypotension;  Start 3/21/20 

at 07:50;  Stop 3/21/20 at 13:49;  Status DC


Albumin Human 200 ml @  200 mls/hr 1X PRN  PRN IV Hypotension;  Start 3/21/20 at

08:00;  Stop 3/21/20 at 13:59;  Status DC


Sodium Chloride (Normal Saline Flush) 10 ml 1X PRN  PRN IV AP catheter pack;  

Start 3/21/20 at 08:00;  Stop 3/22/20 at 07:59;  Status DC


Sodium Chloride (Normal Saline Flush) 10 ml 1X PRN  PRN IV  catheter pack;  

Start 3/21/20 at 08:00;  Stop 3/22/20 at 07:59;  Status DC


Sodium Chloride 1,000 ml @  400 mls/hr Q2H30M PRN IV PATENCY;  Start 3/21/20 at 

07:50;  Stop 3/21/20 at 19:49;  Status DC


Info (PHARMACY MONITORING -- do not chart) 1 each PRN DAILY  PRN MC SEE 

COMMENTS;  Start 3/21/20 at 08:00;  Status UNV


Info (PHARMACY MONITORING -- do not chart) 1 each PRN DAILY  PRN MC SEE 

COMMENTS;  Start 3/21/20 at 08:00;  Stop 3/23/20 at 08:25;  Status DC


Sodium Chloride 90 meq/Potassium Chloride 15 meq/ Potassium Phosphate 10 mmol/ 

Magnesium Sulfate 10 meq/Calcium Gluconate 20 meq/ Multivitamins 10 ml/Chromium/

Copper/Manganese/ Seleni/Zn 0.5 ml/ Total Parenteral Nutrition/Amino 

Acids/Dextrose/ Fat Emulsion Intravenous 1,512 ml @  63 mls/hr TPN  CONT IV  

Last administered on 3/21/20at 20:57;  Start 3/21/20 at 22:00;  Stop 3/22/20 at 

21:59;  Status DC


Sodium Chloride 90 meq/Potassium Chloride 15 meq/ Potassium Phosphate 15 mmol/ 

Magnesium Sulfate 10 meq/Calcium Gluconate 20 meq/ Multivitamins 10 ml/Chromium/

Copper/Manganese/ Seleni/Zn 0.5 ml/ Total Parenteral Nutrition/Amino 

Acids/Dextrose/ Fat Emulsion Intravenous 1,512 ml @  63 mls/hr TPN  CONT IV ;  

Start 3/22/20 at 22:00;  Stop 3/22/20 at 14:16;  Status DC


Sodium Chloride 90 meq/Potassium Chloride 15 meq/ Potassium Phosphate 15 mmol/ 

Magnesium Sulfate 10 meq/Calcium Gluconate 20 meq/ Multivitamins 10 ml/Chromium/

Copper/Manganese/ Seleni/Zn 0.5 ml/ Total Parenteral Nutrition/Amino 

Acids/Dextrose/ Fat Emulsion Intravenous 1,200 ml @  50 mls/hr TPN  CONT IV ;  

Start 3/22/20 at 22:00;  Stop 3/22/20 at 14:17;  Status DC


Sodium Chloride 90 meq/Potassium Chloride 15 meq/ Potassium Phosphate 10 mmol/ 

Magnesium Sulfate 10 meq/Calcium Gluconate 20 meq/ Multivitamins 10 ml/Chromium/

Copper/Manganese/ Seleni/Zn 0.5 ml/ Total Parenteral Nutrition/Amino 

Acids/Dextrose/ Fat Emulsion Intravenous 1,200 ml @  50 mls/hr TPN  CONT IV  

Last administered on 3/22/20at 23:29;  Start 3/22/20 at 22:00;  Stop 3/23/20 at 

21:59;  Status DC


Sodium Chloride 1,000 ml @  1,000 mls/hr Q1H PRN IV hypotension;  Start 3/23/20 

at 07:28;  Stop 3/23/20 at 13:27;  Status DC


Albumin Human 200 ml @  200 mls/hr 1X  ONCE IV  Last administered on 3/23/20at 

08:51;  Start 3/23/20 at 07:30;  Stop 3/23/20 at 08:29;  Status DC


Diphenhydramine HCl (Benadryl) 25 mg 1X PRN  PRN IV ITCHING;  Start 3/23/20 at 

07:30;  Stop 3/24/20 at 07:29;  Status DC


Diphenhydramine HCl (Benadryl) 25 mg 1X PRN  PRN IV ITCHING;  Start 3/23/20 at 

07:30;  Stop 3/24/20 at 07:29;  Status DC


Sodium Chloride 1,000 ml @  400 mls/hr Q2H30M PRN IV PATENCY;  Start 3/23/20 at 

07:28;  Stop 3/23/20 at 19:27;  Status DC


Info (PHARMACY MONITORING -- do not chart) 1 each PRN DAILY  PRN MC SEE 

COMMENTS;  Start 3/23/20 at 07:30;  Stop 4/3/20 at 13:01;  Status DC


Metronidazole 100 ml @  100 mls/hr Q6HRS IV  Last administered on 4/8/20at 

06:26;  Start 3/23/20 at 08:30;  Stop 4/8/20 at 09:58;  Status DC


Micafungin Sodium 100 mg/Dextrose 100 ml @  100 mls/hr Q24H IV  Last 

administered on 4/30/20at 08:18;  Start 3/23/20 at 09:00;  Stop 4/30/20 at 

20:58;  Status DC


Propofol 0 ml @ As Directed STK-MED ONCE IV ;  Start 3/23/20 at 07:53;  Stop 

3/23/20 at 07:53;  Status DC


Etomidate (Amidate) 20 mg STK-MED ONCE IV ;  Start 3/23/20 at 07:53;  Stop 

3/23/20 at 07:54;  Status DC


Midazolam HCl (Versed) 5 mg STK-MED ONCE .ROUTE ;  Start 3/23/20 at 07:57;  Stop

3/23/20 at 07:57;  Status DC


Fentanyl Citrate 30 ml @ 0 mls/hr CONT  PRN IV SEE PROTOCOL Last administered on

4/17/20at 06:12;  Start 3/23/20 at 08:15;  Stop 4/17/20 at 09:19;  Status DC


Artificial Tears (Artificial Tears) 1 drop PRN Q1HR  PRN OU DRY EYE, 1st choice;

 Start 3/23/20 at 08:15;  Stop 4/29/20 at 05:31;  Status DC


Midazolam HCl 50 mg/Sodium Chloride 50 ml @ 0 mls/hr CONT  PRN IV SEE PROTOCOL 

Last administered on 3/26/20at 22:39;  Start 3/23/20 at 08:15;  Stop 3/28/20 at 

15:59;  Status DC


Etomidate (Amidate) 8 mg 1X  ONCE IV  Last administered on 3/23/20at 08:33;  

Start 3/23/20 at 08:30;  Stop 3/23/20 at 08:31;  Status DC


Succinylcholine Chloride (Anectine) 120 mg 1X  ONCE IV  Last administered on 

3/23/20at 08:34;  Start 3/23/20 at 08:30;  Stop 3/23/20 at 08:31;  Status DC


Midazolam HCl (Versed) 5 mg 1X  ONCE IV ;  Start 3/23/20 at 08:30;  Stop 3/23/20

at 08:31;  Status DC


Potassium Chloride 15 meq/ Bicarbonate Dialysis Soln w/ out KCl 5,007.5 ml  @ 

1,000 mls/ hr Q5H1M IV  Last administered on 3/24/20at 11:11;  Start 3/23/20 at 

12:00;  Stop 3/24/20 at 11:15;  Status DC


Potassium Chloride 15 meq/ Bicarbonate Dialysis Soln w/ out KCl 5,007.5 ml  @ 

1,000 mls/ hr Q5H1M IV  Last administered on 3/24/20at 11:12;  Start 3/23/20 at 

12:00;  Stop 3/24/20 at 11:17;  Status DC


Potassium Chloride 15 meq/ Bicarbonate Dialysis Soln w/ out KCl 5,007.5 ml  @ 

1,000 mls/ hr Q5H1M IV  Last administered on 3/24/20at 11:11;  Start 3/23/20 at 

12:00;  Stop 3/24/20 at 11:19;  Status DC


Sodium Chloride 90 meq/Potassium Chloride 15 meq/ Potassium Phosphate 10 mmol/ 

Magnesium Sulfate 10 meq/Calcium Gluconate 20 meq/ Multivitamins 10 ml/Chromium/

Copper/Manganese/ Seleni/Zn 0.5 ml/ Total Parenteral Nutrition/Amino 

Acids/Dextrose/ Fat Emulsion Intravenous 1,400 ml @  58.333 mls/ hr TPN  CONT IV

 Last administered on 3/23/20at 21:42;  Start 3/23/20 at 22:00;  Stop 3/24/20 at

21:59;  Status DC


Heparin Sodium (Porcine) (Heparin Sodium) 5,000 unit Q8HRS SQ  Last administered

on 3/28/20at 05:55;  Start 3/23/20 at 15:00;  Stop 3/28/20 at 13:28;  Status DC


Meropenem 500 mg/ Sodium Chloride 50 ml @  100 mls/hr Q6HRS IV  Last 

administered on 3/25/20at 06:00;  Start 3/24/20 at 09:00;  Stop 3/25/20 at 

07:29;  Status DC


Potassium Phosphate 20 mmol/ Sodium Chloride 106.6667 ml @  51.667 m... 1X  ONCE

IV  Last administered on 3/24/20at 11:22;  Start 3/24/20 at 10:15;  Stop 3/24/20

at 12:18;  Status DC


Acetaminophen (Tylenol Supp) 650 mg PRN Q6HRS  PRN SD MILD PAIN / TEMP > 100.3'F

Last administered on 6/18/20at 10:16;  Start 3/24/20 at 10:30


Potassium Chloride/Water 100 ml @  100 mls/hr Q1H IV  Last administered on 

3/24/20at 12:12;  Start 3/24/20 at 11:00;  Stop 3/24/20 at 12:59;  Status DC


Potassium Chloride 20 meq/ Bicarbonate Dialysis Soln w/ out KCl 5,010 ml @  

1,000 mls/hr Q5H1M IV  Last administered on 3/25/20at 08:48;  Start 3/24/20 at 

12:00;  Stop 3/25/20 at 13:03;  Status DC


Potassium Chloride 20 meq/ Bicarbonate Dialysis Soln w/ out KCl 5,010 ml @  

1,000 mls/hr Q5H1M IV  Last administered on 3/29/20at 14:52;  Start 3/24/20 at 1

1:30;  Stop 3/29/20 at 19:59;  Status DC


Potassium Chloride 20 meq/ Bicarbonate Dialysis Soln w/ out KCl 5,010 ml @  

1,000 mls/hr Q5H1M IV  Last administered on 3/29/20at 14:53;  Start 3/24/20 at 

11:30;  Stop 3/29/20 at 19:59;  Status DC


Sodium Chloride 90 meq/Potassium Chloride 15 meq/ Potassium Phosphate 15 mmol/ 

Magnesium Sulfate 10 meq/Calcium Gluconate 15 meq/ Multivitamins 10 ml/Chromium/

Copper/Manganese/ Seleni/Zn 0.5 ml/ Total Parenteral Nutrition/Amino 

Acids/Dextrose/ Fat Emulsion Intravenous 1,400 ml @  58.333 mls/ hr TPN  CONT IV

 Last administered on 3/24/20at 22:17;  Start 3/24/20 at 22:00;  Stop 3/25/20 at

21:59;  Status DC


Cefepime HCl (Maxipime) 2 gm Q12HR IVP  Last administered on 4/7/20at 20:56;  

Start 3/25/20 at 09:00;  Stop 4/8/20 at 09:58;  Status DC


Daptomycin 500 mg/ Sodium Chloride 50 ml @  100 mls/hr Q48H IV  Last 

administered on 4/10/20at 09:57;  Start 3/25/20 at 08:30;  Stop 4/10/20 at 

10:07;  Status DC


Lidocaine HCl (Buffered Lidocaine 1%) 3 ml 1X  ONCE INJ  Last administered on 3

/25/20at 10:27;  Start 3/25/20 at 10:30;  Stop 3/25/20 at 10:31;  Status DC


Potassium Phosphate 20 mmol/ Sodium Chloride 106.6667 ml @  51.667 m... 1X  ONCE

IV  Last administered on 3/25/20at 12:51;  Start 3/25/20 at 13:00;  Stop 3/25/20

at 15:03;  Status DC


Sodium Chloride 90 meq/Potassium Chloride 15 meq/ Potassium Phosphate 18 mmol/ 

Magnesium Sulfate 8 meq/Calcium Gluconate 15 meq/ Multivitamins 10 ml/Chromium/ 

Copper/Manganese/ Seleni/Zn 0.5 ml/ Total Parenteral Nutrition/Amino 

Acids/Dextrose/ Fat Emulsion Intravenous 1,400 ml @  58.333 mls/ hr TPN  CONT IV

 Last administered on 3/25/20at 22:16;  Start 3/25/20 at 22:00;  Stop 3/26/20 at

21:59;  Status DC


Potassium Chloride 20 meq/ Bicarbonate Dialysis Soln w/ out KCl 5,010 ml @  

1,000 mls/hr Q5H1M IV  Last administered on 3/29/20at 14:54;  Start 3/25/20 at 

16:00;  Stop 3/29/20 at 19:59;  Status DC


Multi-Ingred Cream/Lotion/Oil/ Oint (Artificial Tears Eye Ointment) 1 thomas PRN 

Q1HR  PRN OU DRY EYE, 2nd choice Last administered on 4/13/20at 08:19;  Start 

3/25/20 at 17:30;  Stop 6/3/20 at 14:39;  Status DC


Sodium Chloride 90 meq/Potassium Chloride 15 meq/ Potassium Phosphate 18 mmol/ M

agnesium Sulfate 8 meq/Calcium Gluconate 15 meq/ Multivitamins 10 ml/Chromium/ 

Copper/Manganese/ Seleni/Zn 0.5 ml/ Total Parenteral Nutrition/Amino 

Acids/Dextrose/ Fat Emulsion Intravenous 1,400 ml @  58.333 mls/ hr TPN  CONT IV

 Last administered on 3/26/20at 22:00;  Start 3/26/20 at 22:00;  Stop 3/27/20 at

21:59;  Status DC


Albumin Human 500 ml @  125 mls/hr 1X  ONCE IV ;  Start 3/26/20 at 14:15;  Stop 

3/26/20 at 18:14;  Status DC


Sodium Chloride 90 meq/Potassium Chloride 15 meq/ Potassium Phosphate 18 mmol/ 

Magnesium Sulfate 8 meq/Calcium Gluconate 15 meq/ Multivitamins 10 ml/Chromium/ 

Copper/Manganese/ Seleni/Zn 0.5 ml/ Insulin Human Regular 10 unit/ Total 

Parenteral Nutrition/Amino Acids/Dextrose/ Fat Emulsion Intravenous 1,400 ml @  

58.333 mls/ hr TPN  CONT IV  Last administered on 3/27/20at 21:43;  Start 

3/27/20 at 22:00;  Stop 3/28/20 at 21:59;  Status DC


Lidocaine HCl (Buffered Lidocaine 1%) 3 ml STK-MED ONCE .ROUTE ;  Start 3/25/20 

at 10:00;  Stop 3/27/20 at 13:57;  Status DC


Midazolam HCl 100 mg/Sodium Chloride 100 ml @ 7 mls/hr CONT  PRN IV SEE PROTOCOL

Last administered on 4/8/20at 15:35;  Start 3/28/20 at 16:00;  Stop 6/3/20 at 

14:38;  Status DC


Sodium Chloride 90 meq/Potassium Chloride 15 meq/ Potassium Phosphate 18 mmol/ 

Magnesium Sulfate 8 meq/Calcium Gluconate 15 meq/ Multivitamins 10 ml/Chromium/ 

Copper/Manganese/ Seleni/Zn 0.5 ml/ Insulin Human Regular 15 unit/ Total 

Parenteral Nutrition/Amino Acids/Dextrose/ Fat Emulsion Intravenous 1,400 ml @  

58.333 mls/ hr TPN  CONT IV  Last administered on 3/28/20at 20:34;  Start 

3/28/20 at 22:00;  Stop 3/29/20 at 21:59;  Status DC


Info ([facility] Electrolyte Protocol) 1 ea CONT PRN  PRN MC PER PROTOCOL;  Start 

3/29/20 at 13:15


Sodium Chloride 90 meq/Potassium Chloride 15 meq/ Potassium Phosphate 18 mmol/ 

Magnesium Sulfate 8 meq/Calcium Gluconate 15 meq/ Multivitamins 10 ml/Chromium/ 

Copper/Manganese/ Seleni/Zn 0.5 ml/ Insulin Human Regular 15 unit/ Total 

Parenteral Nutrition/Amino Acids/Dextrose/ Fat Emulsion Intravenous 1,400 ml @  

58.333 mls/ hr TPN  CONT IV  Last administered on 3/29/20at 22:05;  Start 

3/29/20 at 22:00;  Stop 3/30/20 at 21:59;  Status DC


Potassium Chloride 15 meq/ Bicarbonate Dialysis Soln w/ out KCl 5,007.5 ml  @ 

1,000 mls/ hr Q5H1M IV  Last administered on 4/1/20at 18:14;  Start 3/29/20 at 

20:00;  Stop 4/2/20 at 13:08;  Status DC


Potassium Chloride 15 meq/ Bicarbonate Dialysis Soln w/ out KCl 5,007.5 ml  @ 

1,000 mls/ hr Q5H1M IV  Last administered on 4/1/20at 18:14;  Start 3/29/20 at 

20:00;  Stop 4/2/20 at 13:08;  Status DC


Potassium Chloride 15 meq/ Bicarbonate Dialysis Soln w/ out KCl 5,007.5 ml  @ 

1,000 mls/ hr Q5H1M IV  Last administered on 4/1/20at 18:14;  Start 3/29/20 at 

20:00;  Stop 4/2/20 at 13:08;  Status DC


Iohexol (Omnipaque 240 Mg/ml) 30 ml 1X  ONCE PO  Last administered on 3/30/20at 

11:30;  Start 3/30/20 at 11:30;  Stop 3/30/20 at 11:33;  Status DC


Info (CONTRAST GIVEN -- Rx MONITORING) 1 each PRN DAILY  PRN MC SEE COMMENTS;  

Start 3/30/20 at 11:45;  Stop 4/1/20 at 11:44;  Status DC


Sodium Chloride 90 meq/Potassium Chloride 15 meq/ Potassium Phosphate 18 mmol/ 

Magnesium Sulfate 8 meq/Calcium Gluconate 15 meq/ Multivitamins 10 ml/Chromium/ 

Copper/Manganese/ Seleni/Zn 0.5 ml/ Insulin Human Regular 15 unit/ Total 

Parenteral Nutrition/Amino Acids/Dextrose/ Fat Emulsion Intravenous 1,400 ml @  

58.333 mls/ hr TPN  CONT IV  Last administered on 3/30/20at 21:47;  Start 

3/30/20 at 22:00;  Stop 3/31/20 at 21:59;  Status DC


Sodium Chloride 90 meq/Potassium Chloride 15 meq/ Potassium Phosphate 18 mmol/ 

Magnesium Sulfate 8 meq/Calcium Gluconate 15 meq/ Multivitamins 10 ml/Chromium/ 

Copper/Manganese/ Seleni/Zn 0.5 ml/ Insulin Human Regular 20 unit/ Total 

Parenteral Nutrition/Amino Acids/Dextrose/ Fat Emulsion Intravenous 1,400 ml @  

58.333 mls/ hr TPN  CONT IV  Last administered on 3/31/20at 21:36;  Start 

3/31/20 at 22:00;  Stop 4/1/20 at 21:59;  Status DC


Alteplase, Recombinant (Cathflo For Central Catheter Clearance) 1 mg 1X  ONCE 

INT CAT  Last administered on 3/31/20at 20:03;  Start 3/31/20 at 19:30;  Stop 

3/31/20 at 19:46;  Status DC


Alteplase, Recombinant (Cathflo For Central Catheter Clearance) 1 mg 1X  ONCE 

INT CAT  Last administered on 3/31/20at 22:05;  Start 3/31/20 at 22:00;  Stop 

3/31/20 at 22:01;  Status DC


Sodium Chloride 90 meq/Potassium Chloride 15 meq/ Potassium Phosphate 18 mmol/ 

Magnesium Sulfate 8 meq/Calcium Gluconate 15 meq/ Multivitamins 10 ml/Chromium/ 

Copper/Manganese/ Seleni/Zn 0.5 ml/ Insulin Human Regular 20 unit/ Total 

Parenteral Nutrition/Amino Acids/Dextrose/ Fat Emulsion Intravenous 1,400 ml @  

58.333 mls/ hr TPN  CONT IV  Last administered on 4/1/20at 21:30;  Start 4/1/20 

at 22:00;  Stop 4/2/20 at 21:59;  Status DC


Dexmedetomidine HCl 400 mcg/ Sodium Chloride 100 ml @ 0 mls/hr CONT  PRN IV 

ANXIETY / AGITATION Last administered on 5/30/20at 12:57;  Start 4/2/20 at 08:1

5;  Stop 5/30/20 at 18:31;  Status DC


Sodium Chloride 500 ml @  500 mls/hr 1X PRN  PRN IV ELEVATED BP, SEE COMMENTS;  

Start 4/2/20 at 08:15


Atropine Sulfate (ATROPINE 0.5mg SYRINGE) 0.5 mg PRN Q5MIN  PRN IV SEE COMMENTS;

 Start 4/2/20 at 08:15


Furosemide (Lasix) 20 mg 1X  ONCE IVP  Last administered on 4/2/20at 08:19;  

Start 4/2/20 at 08:15;  Stop 4/2/20 at 08:16;  Status DC


Lidocaine HCl (Buffered Lidocaine 1%) 3 ml STK-MED ONCE .ROUTE ;  Start 4/2/20 

at 08:39;  Stop 4/2/20 at 08:39;  Status DC


Lidocaine HCl (Buffered Lidocaine 1%) 6 ml 1X  ONCE INJ  Last administered on 

4/2/20at 09:05;  Start 4/2/20 at 09:00;  Stop 4/2/20 at 09:06;  Status DC


Sodium Chloride 90 meq/Potassium Chloride 15 meq/ Potassium Phosphate 18 mmol/ 

Magnesium Sulfate 8 meq/Calcium Gluconate 15 meq/ Multivitamins 10 ml/Chromium/ 

Copper/Manganese/ Seleni/Zn 0.5 ml/ Insulin Human Regular 20 unit/ Total 

Parenteral Nutrition/Amino Acids/Dextrose/ Fat Emulsion Intravenous 1,400 ml @  

58.333 mls/ hr TPN  CONT IV  Last administered on 4/2/20at 22:45;  Start 4/2/20 

at 22:00;  Stop 4/3/20 at 21:59;  Status DC


Sodium Chloride 1,000 ml @  1,000 mls/hr Q1H PRN IV hypotension;  Start 4/3/20 

at 07:30;  Stop 4/3/20 at 13:29;  Status DC


Albumin Human 200 ml @  200 mls/hr 1X PRN  PRN IV Hypotension Last administered 

on 4/3/20at 09:36;  Start 4/3/20 at 07:30;  Stop 4/3/20 at 13:29;  Status DC


Sodium Chloride (Normal Saline Flush) 10 ml 1X PRN  PRN IV AP catheter pack;  

Start 4/3/20 at 07:30;  Stop 4/3/20 at 21:29;  Status DC


Sodium Chloride (Normal Saline Flush) 10 ml 1X PRN  PRN IV  catheter pack;  

Start 4/3/20 at 07:30;  Stop 4/4/20 at 07:29;  Status DC


Sodium Chloride 1,000 ml @  400 mls/hr Q2H30M PRN IV PATENCY;  Start 4/3/20 at 

07:30;  Stop 4/3/20 at 19:29;  Status DC


Info (PHARMACY MONITORING -- do not chart) 1 each PRN DAILY  PRN MC SEE 

COMMENTS;  Start 4/3/20 at 07:30;  Stop 4/3/20 at 13:02;  Status DC


Info (PHARMACY MONITORING -- do not chart) 1 each PRN DAILY  PRN MC SEE 

COMMENTS;  Start 4/3/20 at 07:30;  Stop 4/5/20 at 12:45;  Status DC


Sodium Chloride 90 meq/Potassium Chloride 15 meq/ Potassium Phosphate 10 mmol/ 

Magnesium Sulfate 8 meq/Calcium Gluconate 15 meq/ Multivitamins 10 ml/Chromium/ 

Copper/Manganese/ Seleni/Zn 0.5 ml/ Insulin Human Regular 25 unit/ Total 

Parenteral Nutrition/Amino Acids/Dextrose/ Fat Emulsion Intravenous 1,400 ml @  

58.333 mls/ hr TPN  CONT IV  Last administered on 4/3/20at 22:19;  Start 4/3/20 

at 22:00;  Stop 4/4/20 at 21:59;  Status DC


Heparin Sodium (Porcine) (Heparin Sodium) 5,000 unit Q12HR SQ  Last administered

on 4/26/20at 08:59;  Start 4/3/20 at 21:00;  Stop 4/26/20 at 10:05;  Status DC


Ondansetron HCl (Zofran) 4 mg PRN Q6HRS  PRN IV NAUSEA/VOMITING;  Start 4/6/20 

at 07:00;  Stop 4/7/20 at 06:59;  Status DC


Fentanyl Citrate (Fentanyl 2ml Vial) 25 mcg PRN Q5MIN  PRN IV MILD PAIN 1-3;  

Start 4/6/20 at 07:00;  Stop 4/7/20 at 06:59;  Status DC


Fentanyl Citrate (Fentanyl 2ml Vial) 50 mcg PRN Q5MIN  PRN IV MODERATE TO SEVERE

PAIN;  Start 4/6/20 at 07:00;  Stop 4/7/20 at 06:59;  Status DC


Ringer's Solution 1,000 ml @  30 mls/hr Q24H IV ;  Start 4/6/20 at 07:00;  Stop 

4/6/20 at 18:59;  Status DC


Lidocaine HCl (Xylocaine-Mpf 1% 2ml Vial) 2 ml PRN 1X  PRN ID PRIOR TO IV START;

 Start 4/6/20 at 07:00;  Stop 4/7/20 at 06:59;  Status DC


Prochlorperazine Edisylate (Compazine) 5 mg PACU PRN  PRN IV NAUSEA, MRX1;  

Start 4/6/20 at 07:00;  Stop 4/7/20 at 06:59;  Status DC


Sodium Chloride 1,000 ml @  1,000 mls/hr Q1H PRN IV hypotension;  Start 4/4/20 

at 09:10;  Stop 4/4/20 at 15:09;  Status DC


Albumin Human 200 ml @  200 mls/hr 1X PRN  PRN IV Hypotension Last administered 

on 4/4/20at 10:10;  Start 4/4/20 at 09:15;  Stop 4/4/20 at 15:14;  Status DC


Sodium Chloride 1,000 ml @  400 mls/hr Q2H30M PRN IV PATENCY;  Start 4/4/20 at 

09:10;  Stop 4/4/20 at 21:09;  Status DC


Info (PHARMACY MONITORING -- do not chart) 1 each PRN DAILY  PRN MC SEE 

COMMENTS;  Start 4/4/20 at 09:15;  Stop 4/5/20 at 12:45;  Status DC


Info (PHARMACY MONITORING -- do not chart) 1 each PRN DAILY  PRN MC SEE 

COMMENTS;  Start 4/4/20 at 09:15;  Stop 4/5/20 at 12:45;  Status DC


Sodium Chloride 90 meq/Potassium Chloride 15 meq/ Potassium Phosphate 10 mmol/ 

Magnesium Sulfate 8 meq/Calcium Gluconate 15 meq/ Multivitamins 10 ml/Chromium/ 

Copper/Manganese/ Seleni/Zn 0.5 ml/ Insulin Human Regular 25 unit/ Total 

Parenteral Nutrition/Amino Acids/Dextrose/ Fat Emulsion Intravenous 1,400 ml @  

58.333 mls/ hr TPN  CONT IV  Last administered on 4/4/20at 22:10;  Start 4/4/20 

at 22:00;  Stop 4/5/20 at 21:59;  Status DC


Magnesium Sulfate 50 ml @ 25 mls/hr PRN DAILY  PRN IV for Mag < 1.7 on am labs 

Last administered on 6/18/20at 10:57;  Start 4/5/20 at 09:15


Sodium Chloride 90 meq/Potassium Chloride 15 meq/ Potassium Phosphate 10 mmol/ 

Magnesium Sulfate 8 meq/Calcium Gluconate 15 meq/ Multivitamins 10 ml/Chromium/ 

Copper/Manganese/ Seleni/Zn 0.5 ml/ Insulin Human Regular 25 unit/ Total 

Parenteral Nutrition/Amino Acids/Dextrose/ Fat Emulsion Intravenous 1,400 ml @  

58.333 mls/ hr TPN  CONT IV  Last administered on 4/5/20at 21:20;  Start 4/5/20 

at 22:00;  Stop 4/6/20 at 21:59;  Status DC


Sodium Chloride 1,000 ml @  1,000 mls/hr Q1H PRN IV hypotension;  Start 4/5/20 

at 12:23;  Stop 4/5/20 at 18:22;  Status DC


Albumin Human 200 ml @  200 mls/hr 1X  ONCE IV  Last administered on 4/5/20at 

13:34;  Start 4/5/20 at 12:30;  Stop 4/5/20 at 13:29;  Status DC


Diphenhydramine HCl (Benadryl) 25 mg 1X PRN  PRN IV ITCHING;  Start 4/5/20 at 

12:30;  Stop 4/6/20 at 12:29;  Status DC


Diphenhydramine HCl (Benadryl) 25 mg 1X PRN  PRN IV ITCHING;  Start 4/5/20 at 

12:30;  Stop 4/6/20 at 12:29;  Status DC


Info (PHARMACY MONITORING -- do not chart) 1 each PRN DAILY  PRN MC SEE 

COMMENTS;  Start 4/5/20 at 12:30;  Status Cancel


Bupivacaine HCl/ Epinephrine Bitart (Sensorcain-Epi 0.5%-1:039350 Mpf) 30 ml 

STK-MED ONCE .ROUTE  Last administered on 4/6/20at 11:44;  Start 4/6/20 at 1

1:00;  Stop 4/6/20 at 11:01;  Status DC


Cellulose (Surgicel Fibrillar 1x2) 1 each STK-MED ONCE .ROUTE ;  Start 4/6/20 at

11:00;  Stop 4/6/20 at 11:01;  Status DC


Sodium Chloride 90 meq/Potassium Chloride 15 meq/ Potassium Phosphate 10 mmol/ 

Magnesium Sulfate 12 meq/Calcium Gluconate 15 meq/ Multivitamins 10 ml/Chromium/

Copper/Manganese/ Seleni/Zn 0.5 ml/ Insulin Human Regular 25 unit/ Total 

Parenteral Nutrition/Amino Acids/Dextrose/ Fat Emulsion Intravenous 1,400 ml @  

58.333 mls/ hr TPN  CONT IV  Last administered on 4/6/20at 22:24;  Start 4/6/20 

at 22:00;  Stop 4/7/20 at 21:59;  Status DC


Propofol 20 ml @ As Directed STK-MED ONCE IV ;  Start 4/6/20 at 11:07;  Stop 

4/6/20 at 11:07;  Status DC


Cellulose (Surgicel Hemostat 4x8) 1 each STK-MED ONCE .ROUTE  Last administered 

on 4/6/20at 11:44;  Start 4/6/20 at 11:55;  Stop 4/6/20 at 11:56;  Status DC


Sevoflurane (Ultane) 60 ml STK-MED ONCE IH ;  Start 4/6/20 at 12:46;  Stop 

4/6/20 at 12:46;  Status DC


Sodium Chloride 1,000 ml @  1,000 mls/hr Q1H PRN IV hypotension;  Start 4/6/20 

at 13:51;  Stop 4/6/20 at 19:50;  Status DC


Albumin Human 200 ml @  200 mls/hr 1X PRN  PRN IV Hypotension Last administered 

on 4/6/20at 14:51;  Start 4/6/20 at 14:00;  Stop 4/6/20 at 19:59;  Status DC


Diphenhydramine HCl (Benadryl) 25 mg 1X PRN  PRN IV ITCHING;  Start 4/6/20 at 

14:00;  Stop 4/7/20 at 13:59;  Status DC


Diphenhydramine HCl (Benadryl) 25 mg 1X PRN  PRN IV ITCHING;  Start 4/6/20 at 

14:00;  Stop 4/7/20 at 13:59;  Status DC


Sodium Chloride 1,000 ml @  400 mls/hr Q2H30M PRN IV PATENCY;  Start 4/6/20 at 

13:51;  Stop 4/7/20 at 01:50;  Status DC


Info (PHARMACY MONITORING -- do not chart) 1 each PRN DAILY  PRN MC SEE 

COMMENTS;  Start 4/6/20 at 14:00;  Stop 4/9/20 at 08:16;  Status DC


Heparin Sodium (Porcine) (Hep Lock Adult) 500 unit STK-MED ONCE IVP ;  Start 

4/7/20 at 09:29;  Stop 4/7/20 at 09:30;  Status DC


Sodium Chloride 1,000 ml @  1,000 mls/hr Q1H PRN IV hypotension;  Start 4/7/20 

at 10:43;  Stop 4/7/20 at 16:42;  Status DC


Sodium Chloride 1,000 ml @  400 mls/hr Q2H30M PRN IV PATENCY;  Start 4/7/20 at 

10:43;  Stop 4/7/20 at 22:42;  Status DC


Info (PHARMACY MONITORING -- do not chart) 1 each PRN DAILY  PRN MC SEE 

COMMENTS;  Start 4/7/20 at 10:45;  Status UNV


Info (PHARMACY MONITORING -- do not chart) 1 each PRN DAILY  PRN MC SEE C

OMMENTS;  Start 4/7/20 at 10:45;  Status UNV


Sodium Chloride 90 meq/Potassium Chloride 15 meq/ Magnesium Sulfate 12 

meq/Calcium Gluconate 15 meq/ Multivitamins 10 ml/Chromium/ Copper/Manganese/ 

Seleni/Zn 0.5 ml/ Insulin Human Regular 25 unit/ Total Parenteral 

Nutrition/Amino Acids/Dextrose/ Fat Emulsion Intravenous 1,400 ml @  58.333 mls/

hr TPN  CONT IV  Last administered on 4/7/20at 22:13;  Start 4/7/20 at 22:00;  

Stop 4/8/20 at 21:59;  Status DC


Sodium Chloride 1,000 ml @  1,000 mls/hr Q1H PRN IV hypotension;  Start 4/8/20 

at 07:50;  Stop 4/8/20 at 13:49;  Status DC


Albumin Human 200 ml @  200 mls/hr 1X  ONCE IV ;  Start 4/8/20 at 08:00;  Stop 

4/8/20 at 08:53;  Status DC


Diphenhydramine HCl (Benadryl) 25 mg 1X PRN  PRN IV ITCHING;  Start 4/8/20 at 

08:00;  Stop 4/9/20 at 07:59;  Status DC


Diphenhydramine HCl (Benadryl) 25 mg 1X PRN  PRN IV ITCHING;  Start 4/8/20 at 

08:00;  Stop 4/9/20 at 07:59;  Status DC


Info (PHARMACY MONITORING -- do not chart) 1 each PRN DAILY  PRN MC SEE 

COMMENTS;  Start 4/8/20 at 08:00;  Stop 4/9/20 at 08:16;  Status DC


Albumin Human 50 ml @ 50 mls/hr 1X  ONCE IV ;  Start 4/8/20 at 08:53;  Stop 

4/8/20 at 08:56;  Status DC


Albumin Human 200 ml @  50 mls/hr PRN 1X  PRN IV HYPOTENSION Last administered 

on 4/14/20at 11:54;  Start 4/8/20 at 09:00;  Stop 5/21/20 at 11:14;  Status DC


Meropenem 500 mg/ Sodium Chloride 50 ml @  100 mls/hr Q12H IV  Last administered

on 4/28/20at 10:45;  Start 4/8/20 at 10:00;  Stop 4/28/20 at 12:37;  Status DC


Sodium Chloride 90 meq/Magnesium Sulfate 12 meq/ Calcium Gluconate 15 meq/ 

Multivitamins 10 ml/Chromium/ Copper/Manganese/ Seleni/Zn 0.5 ml/ Insulin Human 

Regular 25 unit/ Total Parenteral Nutrition/Amino Acids/Dextrose/ Fat Emulsion 

Intravenous 1,400 ml @  58.333 mls/ hr TPN  CONT IV  Last administered on 4 /8/20at 21:41;  Start 4/8/20 at 22:00;  Stop 4/9/20 at 21:59;  Status DC


Sodium Chloride 1,000 ml @  1,000 mls/hr Q1H PRN IV hypotension;  Start 4/9/20 

at 07:58;  Stop 4/9/20 at 13:57;  Status DC


Albumin Human 200 ml @  200 mls/hr 1X PRN  PRN IV Hypotension Last administered 

on 4/9/20at 09:30;  Start 4/9/20 at 08:00;  Stop 4/9/20 at 13:59;  Status DC


Sodium Chloride 1,000 ml @  400 mls/hr Q2H30M PRN IV PATENCY;  Start 4/9/20 at 

07:58;  Stop 4/9/20 at 19:57;  Status DC


Info (PHARMACY MONITORING -- do not chart) 1 each PRN DAILY  PRN MC SEE 

COMMENTS;  Start 4/9/20 at 08:00;  Status Cancel


Info (PHARMACY MONITORING -- do not chart) 1 each PRN DAILY  PRN MC SEE 

COMMENTS;  Start 4/9/20 at 08:15;  Status UNV


Sodium Chloride 90 meq/Potassium Phosphate 5 mmol/ Magnesium Sulfate 12 

meq/Calcium Gluconate 15 meq/ Multivitamins 10 ml/Chromium/ Copper/Manganese/ 

Seleni/Zn 0.5 ml/ Insulin Human Regular 30 unit/ Total Parenteral 

Nutrition/Amino Acids/Dextrose/ Fat Emulsion Intravenous 1,400 ml @  58.333 mls/

hr TPN  CONT IV  Last administered on 4/9/20at 22:08;  Start 4/9/20 at 22:00;  

Stop 4/10/20 at 21:59;  Status DC


Linezolid/Dextrose 300 ml @  300 mls/hr Q12HR IV  Last administered on 4/20/20at

20:40;  Start 4/10/20 at 11:00;  Stop 4/21/20 at 08:10;  Status DC


Sodium Chloride 90 meq/Potassium Phosphate 15 mmol/ Magnesium Sulfate 12 

meq/Calcium Gluconate 15 meq/ Multivitamins 10 ml/Chromium/ Copper/Manganese/ 

Seleni/Zn 0.5 ml/ Insulin Human Regular 30 unit/ Total Parenteral 

Nutrition/Amino Acids/Dextrose/ Fat Emulsion Intravenous 1,400 ml @  58.333 mls/

hr TPN  CONT IV  Last administered on 4/10/20at 21:49;  Start 4/10/20 at 22:00; 

Stop 4/11/20 at 21:59;  Status DC


Sodium Chloride 90 meq/Potassium Phosphate 15 mmol/ Magnesium Sulfate 12 

meq/Calcium Gluconate 15 meq/ Multivitamins 10 ml/Chromium/ Copper/Manganese/ 

Seleni/Zn 0.5 ml/ Insulin Human Regular 40 unit/ Total Parenteral 

Nutrition/Amino Acids/Dextrose/ Fat Emulsion Intravenous 1,400 ml @  58.333 mls/

hr TPN  CONT IV  Last administered on 4/11/20at 21:21;  Start 4/11/20 at 22:00; 

Stop 4/12/20 at 21:59;  Status DC


Sodium Chloride 1,000 ml @  1,000 mls/hr Q1H PRN IV hypotension;  Start 4/11/20 

at 13:26;  Stop 4/11/20 at 19:25;  Status DC


Albumin Human 200 ml @  200 mls/hr 1X PRN  PRN IV Hypotension Last administered 

on 4/11/20at 15:00;  Start 4/11/20 at 13:30;  Stop 4/11/20 at 19:29;  Status DC


Sodium Chloride (Normal Saline Flush) 10 ml 1X PRN  PRN IV AP catheter pack;  

Start 4/11/20 at 13:30;  Stop 4/12/20 at 13:29;  Status DC


Sodium Chloride (Normal Saline Flush) 10 ml 1X PRN  PRN IV  catheter pack;  

Start 4/11/20 at 13:30;  Stop 4/12/20 at 13:29;  Status DC


Sodium Chloride 1,000 ml @  400 mls/hr Q2H30M PRN IV PATENCY;  Start 4/11/20 at 

13:26;  Stop 4/12/20 at 01:25;  Status DC


Info (PHARMACY MONITORING -- do not chart) 1 each PRN DAILY  PRN MC SEE 

COMMENTS;  Start 4/11/20 at 13:30;  Stop 4/11/20 at 13:33;  Status DC


Info (PHARMACY MONITORING -- do not chart) 1 each PRN DAILY  PRN MC SEE 

COMMENTS;  Start 4/11/20 at 13:30;  Stop 4/11/20 at 13:34;  Status DC


Sodium Chloride 90 meq/Potassium Phosphate 19 mmol/ Magnesium Sulfate 12 

meq/Calcium Gluconate 15 meq/ Multivitamins 10 ml/Chromium/ Copper/Manganese/ 

Seleni/Zn 0.5 ml/ Insulin Human Regular 40 unit/ Total Parenteral 

Nutrition/Amino Acids/Dextrose/ Fat Emulsion Intravenous 1,400 ml @  58.333 mls/

hr TPN  CONT IV  Last administered on 4/12/20at 21:54;  Start 4/12/20 at 22:00; 

Stop 4/13/20 at 21:59;  Status DC


Sodium Chloride 1,000 ml @  1,000 mls/hr Q1H PRN IV hypotension;  Start 4/13/20 

at 09:35;  Stop 4/13/20 at 15:34;  Status DC


Albumin Human 200 ml @  200 mls/hr 1X PRN  PRN IV Hypotension;  Start 4/13/20 at

09:45;  Stop 4/13/20 at 15:44;  Status DC


Diphenhydramine HCl (Benadryl) 25 mg 1X PRN  PRN IV ITCHING;  Start 4/13/20 at 

09:45;  Stop 4/14/20 at 09:44;  Status DC


Diphenhydramine HCl (Benadryl) 25 mg 1X PRN  PRN IV ITCHING;  Start 4/13/20 at 

09:45;  Stop 4/14/20 at 09:44;  Status DC


Sodium Chloride 1,000 ml @  400 mls/hr Q2H30M PRN IV PATENCY;  Start 4/13/20 at 

09:35;  Stop 4/13/20 at 21:34;  Status DC


Info (PHARMACY MONITORING -- do not chart) 1 each PRN DAILY  PRN MC SEE 

COMMENTS;  Start 4/13/20 at 09:45;  Status Cancel


Sodium Chloride 100 meq/Potassium Phosphate 19 mmol/ Magnesium Sulfate 12 

meq/Calcium Gluconate 15 meq/ Multivitamins 10 ml/Chromium/ Copper/Manganese/ 

Seleni/Zn 0.5 ml/ Insulin Human Regular 40 unit/ Potassium Chloride 20 meq/ 

Total Parenteral Nutrition/Amino Acids/Dextrose/ Fat Emulsion Intravenous 1,400 

ml @  58.333 mls/ hr TPN  CONT IV  Last administered on 4/13/20at 22:02;  Start 

4/13/20 at 22:00;  Stop 4/14/20 at 21:59;  Status DC


Furosemide (Lasix) 40 mg 1X  ONCE IVP  Last administered on 4/13/20at 14:39;  

Start 4/13/20 at 14:30;  Stop 4/13/20 at 14:31;  Status DC


Metronidazole 100 ml @  100 mls/hr Q8HRS IV  Last administered on 4/21/20at 06

:04;  Start 4/14/20 at 10:00;  Stop 4/21/20 at 08:10;  Status DC


Sodium Chloride 1,000 ml @  1,000 mls/hr Q1H PRN IV hypotension;  Start 4/14/20 

at 08:00;  Stop 4/14/20 at 13:59;  Status DC


Albumin Human 200 ml @  200 mls/hr 1X PRN  PRN IV Hypotension;  Start 4/14/20 at

08:00;  Stop 4/14/20 at 13:59;  Status DC


Sodium Chloride 1,000 ml @  400 mls/hr Q2H30M PRN IV PATENCY;  Start 4/14/20 at 

08:00;  Stop 4/14/20 at 19:59;  Status DC


Info (PHARMACY MONITORING -- do not chart) 1 each PRN DAILY  PRN MC SEE COMME

NTS;  Start 4/14/20 at 11:30;  Status UNV


Info (PHARMACY MONITORING -- do not chart) 1 each PRN DAILY  PRN MC SEE 

COMMENTS;  Start 4/14/20 at 11:30;  Stop 4/16/20 at 12:13;  Status DC


Sodium Chloride 100 meq/Potassium Phosphate 19 mmol/ Magnesium Sulfate 12 

meq/Calcium Gluconate 15 meq/ Multivitamins 10 ml/Chromium/ Copper/Manganese/ 

Seleni/Zn 0.5 ml/ Insulin Human Regular 40 unit/ Potassium Chloride 20 meq/ 

Total Parenteral Nutrition/Amino Acids/Dextrose/ Fat Emulsion Intravenous 1,400 

ml @  58.333 mls/ hr TPN  CONT IV  Last administered on 4/14/20at 21:52;  Start 

4/14/20 at 22:00;  Stop 4/15/20 at 21:59;  Status DC


Sodium Chloride (Normal Saline Flush) 10 ml QSHIFT  PRN IV AFTER MEDS AND BLOOD 

DRAWS;  Start 4/14/20 at 15:00;  Stop 5/12/20 at 11:27;  Status DC


Sodium Chloride (Normal Saline Flush) 10 ml PRN Q5MIN  PRN IV AFTER MEDS AND 

BLOOD DRAWS;  Start 4/14/20 at 15:00


Sodium Chloride (Normal Saline Flush) 20 ml PRN Q5MIN  PRN IV AFTER MEDS AND 

BLOOD DRAWS;  Start 4/14/20 at 15:00


Sodium Chloride 100 meq/Potassium Phosphate 19 mmol/ Magnesium Sulfate 12 

meq/Calcium Gluconate 15 meq/ Multivitamins 10 ml/Chromium/ Copper/Manganese/ 

Seleni/Zn 0.5 ml/ Insulin Human Regular 40 unit/ Potassium Chloride 20 meq/ 

Total Parenteral Nutrition/Amino Acids/Dextrose/ Fat Emulsion Intravenous 1,400 

ml @  58.333 mls/ hr TPN  CONT IV  Last administered on 4/15/20at 21:20;  Start 

4/15/20 at 22:00;  Stop 4/16/20 at 21:59;  Status DC


Lidocaine HCl (Buffered Lidocaine 1%) 3 ml STK-MED ONCE .ROUTE ;  Start 4/15/20 

at 13:16;  Stop 4/15/20 at 13:16;  Status DC


Lidocaine HCl (Buffered Lidocaine 1%) 6 ml 1X  ONCE INJ  Last administered on 

4/15/20at 13:45;  Start 4/15/20 at 13:30;  Stop 4/15/20 at 13:31;  Status DC


Albumin Human 100 ml @  100 mls/hr 1X  ONCE IV  Last administered on 4/15/20at 

15:41;  Start 4/15/20 at 15:00;  Stop 4/15/20 at 15:59;  Status DC


Albumin Human 50 ml @ 50 mls/hr 1X  ONCE IV  Last administered on 4/15/20at 

15:00;  Start 4/15/20 at 15:00;  Stop 4/15/20 at 15:59;  Status DC


Info (PHARMACY MONITORING -- do not chart) 1 each PRN DAILY  PRN MC SEE 

COMMENTS;  Start 4/16/20 at 11:30;  Status Cancel


Info (PHARMACY MONITORING -- do not chart) 1 each PRN DAILY  PRN MC SEE 

COMMENTS;  Start 4/16/20 at 11:30;  Status UNV


Sodium Chloride 100 meq/Potassium Phosphate 10 mmol/ Magnesium Sulfate 12 

meq/Calcium Gluconate 15 meq/ Multivitamins 10 ml/Chromium/ Copper/Manganese/ 

Seleni/Zn 0.5 ml/ Insulin Human Regular 35 unit/ Potassium Chloride 20 meq/ 

Total Parenteral Nutrition/Amino Acids/Dextrose/ Fat Emulsion Intravenous 1,400 

ml @  58.333 mls/ hr TPN  CONT IV  Last administered on 4/16/20at 22:10;  Start 

4/16/20 at 22:00;  Stop 4/17/20 at 21:59;  Status DC


Sodium Chloride 100 meq/Potassium Phosphate 5 mmol/ Magnesium Sulfate 12 

meq/Calcium Gluconate 15 meq/ Multivitamins 10 ml/Chromium/ Copper/Manganese/ 

Seleni/Zn 0.5 ml/ Insulin Human Regular 35 unit/ Potassium Chloride 20 meq/ 

Total Parenteral Nutrition/Amino Acids/Dextrose/ Fat Emulsion Intravenous 1,400 

ml @  58.333 mls/ hr TPN  CONT IV  Last administered on 4/17/20at 22:59;  Start 

4/17/20 at 22:00;  Stop 4/18/20 at 21:59;  Status DC


Sodium Chloride 1,000 ml @  1,000 mls/hr Q1H PRN IV hypotension;  Start 4/18/20 

at 08:27;  Stop 4/18/20 at 14:26;  Status DC


Albumin Human 200 ml @  200 mls/hr 1X PRN  PRN IV Hypotension Last administered 

on 4/18/20at 09:18;  Start 4/18/20 at 08:30;  Stop 4/18/20 at 14:29;  Status DC


Sodium Chloride 1,000 ml @  400 mls/hr Q2H30M PRN IV PATENCY;  Start 4/18/20 at 

08:27;  Stop 4/18/20 at 20:26;  Status DC


Info (PHARMACY MONITORING -- do not chart) 1 each PRN DAILY  PRN MC SEE 

COMMENTS;  Start 4/18/20 at 08:30;  Status Cancel


Info (PHARMACY MONITORING -- do not chart) 1 each PRN DAILY  PRN MC SEE 

COMMENTS;  Start 4/18/20 at 08:30;  Stop 4/26/20 at 13:10;  Status DC


Sodium Chloride 100 meq/Potassium Chloride 40 meq/ Magnesium Sulfate 15 

meq/Calcium Gluconate 15 meq/ Multivitamins 10 ml/Chromium/ Copper/Manganese/ 

Seleni/Zn 0.5 ml/ Insulin Human Regular 35 unit/ Total Parenteral 

Nutrition/Amino Acids/Dextrose/ Fat Emulsion Intravenous 1,400 ml @  58.333 mls/

hr TPN  CONT IV  Last administered on 4/18/20at 22:00;  Start 4/18/20 at 22:00; 

Stop 4/19/20 at 21:59;  Status DC


Potassium Chloride/Water 100 ml @  100 mls/hr 1X  ONCE IV  Last administered on 

4/18/20at 17:28;  Start 4/18/20 at 14:45;  Stop 4/18/20 at 15:44;  Status DC


Sodium Chloride 100 meq/Potassium Chloride 40 meq/ Magnesium Sulfate 15 

meq/Calcium Gluconate 15 meq/ Multivitamins 10 ml/Chromium/ Copper/Manganese/ 

Seleni/Zn 0.5 ml/ Insulin Human Regular 35 unit/ Total Parenteral 

Nutrition/Amino Acids/Dextrose/ Fat Emulsion Intravenous 1,400 ml @  58.333 mls/

hr TPN  CONT IV  Last administered on 4/19/20at 22:46;  Start 4/19/20 at 22:00; 

Stop 4/20/20 at 21:59;  Status DC


Sodium Chloride 100 meq/Potassium Chloride 40 meq/ Magnesium Sulfate 20 meq/Calc

ium Gluconate 15 meq/ Multivitamins 10 ml/Chromium/ Copper/Manganese/ Seleni/Zn 

0.5 ml/ Insulin Human Regular 35 unit/ Total Parenteral Nutrition/Amino 

Acids/Dextrose/ Fat Emulsion Intravenous 1,400 ml @  58.333 mls/ hr TPN  CONT IV

 Last administered on 4/20/20at 22:31;  Start 4/20/20 at 22:00;  Stop 4/21/20 at

21:59;  Status DC


Fentanyl Citrate (Fentanyl 2ml Vial) 50 mcg PRN Q2HR  PRN IVP PAIN Last 

administered on 4/27/20at 13:32;  Start 4/20/20 at 21:00;  Stop 4/28/20 at 

12:53;  Status DC


Fentanyl Citrate (Fentanyl 2ml Vial) 25 mcg PRN Q2HR  PRN IVP PAIN;  Start 

4/20/20 at 21:00;  Stop 4/28/20 at 12:54;  Status DC


Enoxaparin Sodium (Lovenox 100mg Syringe) 100 mg Q12HR SQ ;  Start 4/21/20 at 

21:00;  Status UNV


Amino Acids/ Glycerin/ Electrolytes 1,000 ml @  75 mls/hr [id number] IV ;  Start 

4/20/20 at 21:15;  Status UNV


Sodium Chloride 1,000 ml @  1,000 mls/hr Q1H PRN IV hypotension;  Start 4/21/20 

at 07:56;  Stop 4/21/20 at 13:55;  Status DC


Albumin Human 200 ml @  200 mls/hr 1X PRN  PRN IV Hypotension Last administered 

on 4/21/20at 08:40;  Start 4/21/20 at 08:00;  Stop 4/21/20 at 13:59;  Status DC


Sodium Chloride 1,000 ml @  400 mls/hr Q2H30M PRN IV PATENCY;  Start 4/21/20 at 

07:56;  Stop 4/21/20 at 19:55;  Status DC


Info (PHARMACY MONITORING -- do not chart) 1 each PRN DAILY  PRN MC SEE 

COMMENTS;  Start 4/21/20 at 08:00;  Status UNV


Info (PHARMACY MONITORING -- do not chart) 1 each PRN DAILY  PRN MC SEE 

COMMENTS;  Start 4/21/20 at 08:00;  Status UNV


Daptomycin 430 mg/ Sodium Chloride 50 ml @  100 mls/hr Q24H IV  Last 

administered on 4/21/20at 12:35;  Start 4/21/20 at 09:00;  Stop 4/21/20 at 

12:49;  Status DC


Sodium Chloride 100 meq/Potassium Chloride 40 meq/ Magnesium Sulfate 20 

meq/Calcium Gluconate 15 meq/ Multivitamins 10 ml/Chromium/ Copper/Manganese/ 

Seleni/Zn 0.5 ml/ Insulin Human Regular 35 unit/ Total Parenteral 

Nutrition/Amino Acids/Dextrose/ Fat Emulsion Intravenous 1,400 ml @  58.333 mls/

hr TPN  CONT IV  Last administered on 4/21/20at 21:26;  Start 4/21/20 at 22:00; 

Stop 4/22/20 at 21:59;  Status DC


Daptomycin 430 mg/ Sodium Chloride 50 ml @  100 mls/hr Q48H IV ;  Start 4/23/20 

at 09:00;  Stop 4/22/20 at 11:55;  Status DC


Sodium Chloride 100 meq/Potassium Chloride 40 meq/ Magnesium Sulfate 20 

meq/Calcium Gluconate 15 meq/ Multivitamins 10 ml/Chromium/ Copper/Manganese/ 

Seleni/Zn 0.5 ml/ Insulin Human Regular 35 unit/ Total Parenteral 

Nutrition/Amino Acids/Dextrose/ Fat Emulsion Intravenous 1,400 ml @  58.333 mls/

hr TPN  CONT IV  Last administered on 4/22/20at 22:27;  Start 4/22/20 at 22:00; 

Stop 4/23/20 at 21:59;  Status DC


Daptomycin 430 mg/ Sodium Chloride 50 ml @  100 mls/hr Q24H IV  Last 

administered on 4/24/20at 15:07;  Start 4/22/20 at 13:00;  Stop 4/25/20 at 

13:15;  Status DC


Sodium Chloride 100 meq/Potassium Chloride 40 meq/ Magnesium Sulfate 20 

meq/Calcium Gluconate 10 meq/ Multivitamins 10 ml/Chromium/ Copper/Manganese/ 

Seleni/Zn 0.5 ml/ Insulin Human Regular 35 unit/ Total Parenteral 

Nutrition/Amino Acids/Dextrose/ Fat Emulsion Intravenous 1,400 ml @  58.333 mls/

hr TPN  CONT IV  Last administered on 4/24/20at 00:06;  Start 4/23/20 at 22:00; 

Stop 4/24/20 at 21:59;  Status DC


Alteplase, Recombinant (Cathflo For Central Catheter Clearance) 1 mg 1X  ONCE 

INT CAT  Last administered on 4/24/20at 11:44;  Start 4/24/20 at 10:45;  Stop 

4/24/20 at 10:46;  Status DC


Ondansetron HCl (Zofran) 4 mg PRN Q6HRS  PRN IV NAUSEA/VOMITING;  Start 4/27/20 

at 07:00;  Stop 4/28/20 at 06:59;  Status DC


Fentanyl Citrate (Fentanyl 2ml Vial) 25 mcg PRN Q5MIN  PRN IV MILD PAIN 1-3;  

Start 4/27/20 at 07:00;  Stop 4/28/20 at 06:59;  Status DC


Fentanyl Citrate (Fentanyl 2ml Vial) 50 mcg PRN Q5MIN  PRN IV MODERATE TO SEVERE

PAIN Last administered on 4/27/20at 10:17;  Start 4/27/20 at 07:00;  Stop 

4/28/20 at 06:59;  Status DC


Ringer's Solution 1,000 ml @  30 mls/hr Q24H IV ;  Start 4/27/20 at 07:00;  Stop

4/27/20 at 18:59;  Status DC


Lidocaine HCl (Xylocaine-Mpf 1% 2ml Vial) 2 ml PRN 1X  PRN ID PRIOR TO IV START;

 Start 4/27/20 at 07:00;  Stop 4/28/20 at 06:59;  Status DC


Prochlorperazine Edisylate (Compazine) 5 mg PACU PRN  PRN IV NAUSEA, MRX1;  

Start 4/27/20 at 07:00;  Stop 4/28/20 at 06:59;  Status DC


Sodium Acetate 50 meq/Potassium Acetate 55 meq/ Magnesium Sulfate 20 meq/Calcium

Gluconate 10 meq/ Multivitamins 10 ml/Chromium/ Copper/Manganese/ Seleni/Zn 0.5 

ml/ Insulin Human Regular 35 unit/ Total Parenteral Nutrition/Amino 

Acids/Dextrose/ Fat Emulsion Intravenous 1,400 ml @  58.333 mls/ hr TPN  CONT IV

;  Start 4/24/20 at 22:00;  Stop 4/24/20 at 14:15;  Status DC


Sodium Acetate 50 meq/Potassium Acetate 55 meq/ Magnesium Sulfate 20 meq/Calcium

Gluconate 10 meq/ Multivitamins 10 ml/Chromium/ Copper/Manganese/ Seleni/Zn 0.5 

ml/ Insulin Human Regular 35 unit/ Total Parenteral Nutrition/Amino 

Acids/Dextrose/ Fat Emulsion Intravenous 1,800 ml @  75 mls/hr TPN  CONT IV  

Last administered on 4/24/20at 22:38;  Start 4/24/20 at 22:00;  Stop 4/25/20 at 

21:59;  Status DC


Sodium Chloride 1,000 ml @  1,000 mls/hr Q1H PRN IV hypotension;  Start 4/24/20 

at 15:31;  Stop 4/24/20 at 21:30;  Status DC


Diphenhydramine HCl (Benadryl) 25 mg 1X PRN  PRN IV ITCHING;  Start 4/24/20 at 

15:45;  Stop 4/25/20 at 15:44;  Status DC


Diphenhydramine HCl (Benadryl) 25 mg 1X PRN  PRN IV ITCHING;  Start 4/24/20 at 

15:45;  Stop 4/25/20 at 15:44;  Status DC


Sodium Chloride 1,000 ml @  400 mls/hr Q2H30M PRN IV PATENCY;  Start 4/24/20 at 

15:31;  Stop 4/25/20 at 03:30;  Status DC


Info (PHARMACY MONITORING -- do not chart) 1 each PRN DAILY  PRN MC SEE 

COMMENTS;  Start 4/24/20 at 15:45;  Stop 5/26/20 at 14:14;  Status DC


Sodium Acetate 50 meq/Potassium Acetate 55 meq/ Magnesium Sulfate 20 meq/Calcium

Gluconate 10 meq/ Multivitamins 10 ml/Chromium/ Copper/Manganese/ Seleni/Zn 0.5 

ml/ Insulin Human Regular 35 unit/ Total Parenteral Nutrition/Amino 

Acids/Dextrose/ Fat Emulsion Intravenous 1,800 ml @  75 mls/hr TPN  CONT IV  

Last administered on 4/25/20at 22:03;  Start 4/25/20 at 22:00;  Stop 4/26/20 at 

21:59;  Status DC


Daptomycin 430 mg/ Sodium Chloride 50 ml @  100 mls/hr Q24H IV  Last 

administered on 4/30/20at 13:00;  Start 4/25/20 at 13:00;  Stop 4/30/20 at 

20:58;  Status DC


Heparin Sodium (Porcine) 1000 unit/Sodium Chloride 1,001 ml @  1,001 mls/hr 1X  

ONCE IRR ;  Start 4/27/20 at 06:00;  Stop 4/27/20 at 06:59;  Status DC


Potassium Acetate 55 meq/Magnesium Sulfate 20 meq/ Calcium Gluconate 10 meq/ 

Multivitamins 10 ml/Chromium/ Copper/Manganese/ Seleni/Zn 0.5 ml/ Insulin Human 

Regular 35 unit/ Total Parenteral Nutrition/Amino Acids/Dextrose/ Fat Emulsion 

Intravenous 1,920 ml @  80 mls/hr TPN  CONT IV  Last administered on 4/26/20at 

22:10;  Start 4/26/20 at 22:00;  Stop 4/27/20 at 21:59;  Status DC


Dexamethasone Sodium Phosphate (Decadron) 4 mg STK-MED ONCE .ROUTE ;  Start 

4/27/20 at 10:56;  Stop 4/27/20 at 10:57;  Status DC


Ondansetron HCl (Zofran) 4 mg STK-MED ONCE .ROUTE ;  Start 4/27/20 at 10:56;  

Stop 4/27/20 at 10:57;  Status DC


Rocuronium Bromide (Zemuron) 50 mg STK-MED ONCE .ROUTE ;  Start 4/27/20 at 

10:56;  Stop 4/27/20 at 10:57;  Status DC


Fentanyl Citrate (Fentanyl 2ml Vial) 100 mcg STK-MED ONCE .ROUTE ;  Start 

4/27/20 at 10:56;  Stop 4/27/20 at 10:57;  Status DC


Bupivacaine HCl/ Epinephrine Bitart (Sensorcain-Epi 0.5%-1:617308 Mpf) 30 ml 

STK-MED ONCE .ROUTE  Last administered on 4/27/20at 12:01;  Start 4/27/20 at 

10:58;  Stop 4/27/20 at 10:58;  Status DC


Cellulose (Surgicel Hemostat 2x14) 1 each STK-MED ONCE .ROUTE ;  Start 4/27/20 

at 10:58;  Stop 4/27/20 at 10:59;  Status DC


Iohexol (Omnipaque 300 Mg/ml) 50 ml STK-MED ONCE .ROUTE ;  Start 4/27/20 at 

10:58;  Stop 4/27/20 at 10:59;  Status DC


Cellulose (Surgicel Hemostat 4x8) 1 each STK-MED ONCE .ROUTE ;  Start 4/27/20 at

10:58;  Stop 4/27/20 at 10:59;  Status DC


Bisacodyl (Dulcolax Supp) 10 mg STK-MED ONCE .ROUTE ;  Start 4/27/20 at 10:59;  

Stop 4/27/20 at 10:59;  Status DC


Heparin Sodium (Porcine) 1000 unit/Sodium Chloride 1,001 ml @  1,001 mls/hr 1X  

ONCE IRR ;  Start 4/27/20 at 12:00;  Stop 4/27/20 at 12:59;  Status DC


Propofol 20 ml @ As Directed STK-MED ONCE IV ;  Start 4/27/20 at 11:05;  Stop 

4/27/20 at 11:05;  Status DC


Sevoflurane (Ultane) 90 ml STK-MED ONCE IH ;  Start 4/27/20 at 11:05;  Stop 

4/27/20 at 11:05;  Status DC


Sevoflurane (Ultane) 60 ml STK-MED ONCE IH ;  Start 4/27/20 at 12:26;  Stop 

4/27/20 at 12:27;  Status DC


Propofol 20 ml @ As Directed STK-MED ONCE IV ;  Start 4/27/20 at 12:26;  Stop 

4/27/20 at 12:27;  Status DC


Phenylephrine HCl (PHENYLEPHRINE in 0.9% NACL PF) 1 mg STK-MED ONCE IV ;  Start 

4/27/20 at 12:34;  Stop 4/27/20 at 12:34;  Status DC


Heparin Sodium (Porcine) (Heparin Sodium) 5,000 unit Q12HR SQ  Last administered

on 5/6/20at 20:57;  Start 4/27/20 at 21:00;  Stop 5/7/20 at 09:59;  Status DC


Sodium Chloride (Normal Saline Flush) 3 ml QSHIFT  PRN IV AFTER MEDS AND BLOOD 

DRAWS;  Start 4/27/20 at 13:45


Naloxone HCl (Narcan) 0.4 mg PRN Q2MIN  PRN IV SEE INSTRUCTIONS Last 

administered on 6/6/20at 15:15;  Start 4/27/20 at 13:45


Sodium Chloride 1,000 ml @  25 mls/hr Q24H IV  Last administered on 5/26/20at 

13:37;  Start 4/27/20 at 13:37;  Stop 5/29/20 at 13:09;  Status DC


Naloxone HCl (Narcan) 0.4 mg PRN Q2MIN  PRN IV SEE INSTRUCTIONS;  Start 4/27/20 

at 14:30;  Status UNV


Sodium Chloride 1,000 ml @  25 mls/hr Q24H IV ;  Start 4/27/20 at 14:30;  Status

UNV


Hydromorphone HCl 30 ml @ 0 mls/hr CONT PRN  PRN IV PER PROTOCOL Last 

administered on 5/2/20at 16:08;  Start 4/27/20 at 14:30;  Stop 5/4/20 at 08:55; 

Status DC


Potassium Acetate 55 meq/Magnesium Sulfate 20 meq/ Calcium Gluconate 10 meq/ 

Multivitamins 10 ml/Chromium/ Copper/Manganese/ Seleni/Zn 0.5 ml/ Insulin Human 

Regular 35 unit/ Total Parenteral Nutrition/Amino Acids/Dextrose/ Fat Emulsion 

Intravenous 1,920 ml @  80 mls/hr TPN  CONT IV  Last administered on 4/27/20at 

22:01;  Start 4/27/20 at 22:00;  Stop 4/28/20 at 21:59;  Status DC


Bumetanide (Bumex) 2 mg BID92 IV  Last administered on 5/1/20at 13:50;  Start 

4/28/20 at 14:00;  Stop 5/2/20 at 14:10;  Status DC


Meropenem 1 gm/ Sodium Chloride 100 ml @  200 mls/hr Q8HRS IV  Last administered

on 5/22/20at 05:53;  Start 4/28/20 at 14:00;  Stop 5/22/20 at 09:31;  Status DC


Potassium Acetate 55 meq/Magnesium Sulfate 20 meq/ Calcium Gluconate 10 meq/ 

Multivitamins 10 ml/Chromium/ Copper/Manganese/ Seleni/Zn 0.5 ml/ Insulin Human 

Regular 35 unit/ Total Parenteral Nutrition/Amino Acids/Dextrose/ Fat Emulsion 

Intravenous 1,920 ml @  80 mls/hr TPN  CONT IV  Last administered on 4/28/20at 

22:02;  Start 4/28/20 at 22:00;  Stop 4/29/20 at 21:59;  Status DC


Hydromorphone HCl (Dilaudid Standard PCA) 12 mg STK-MED ONCE IV ;  Start 4/27/20

at 14:35;  Stop 4/28/20 at 13:53;  Status DC


Artificial Tears (Artificial Tears) 1 drop PRN Q15MIN  PRN OU DRY EYE Last 

administered on 6/23/20at 21:17;  Start 4/29/20 at 05:30


Hydromorphone HCl (Dilaudid Standard PCA) 12 mg STK-MED ONCE IV ;  Start 4/28/20

at 12:05;  Stop 4/29/20 at 09:15;  Status DC


Potassium Acetate 65 meq/Magnesium Sulfate 20 meq/ Calcium Gluconate 10 meq/ 

Multivitamins 10 ml/Chromium/ Copper/Manganese/ Seleni/Zn 0.5 ml/ Insulin Human 

Regular 30 unit/ Total Parenteral Nutrition/Amino Acids/Dextrose/ Fat Emulsion 

Intravenous 1,920 ml @  80 mls/hr TPN  CONT IV  Last administered on 4/29/20at 

22:22;  Start 4/29/20 at 22:00;  Stop 4/30/20 at 21:59;  Status DC


Cyclobenzaprine HCl (Flexeril) 10 mg PRN Q6HRS  PRN PO MUSCLE SPASMS;  Start 

4/30/20 at 10:45


Potassium Acetate 55 meq/Magnesium Sulfate 20 meq/ Calcium Gluconate 10 meq/ 

Multivitamins 10 ml/Chromium/ Copper/Manganese/ Seleni/Zn 0.5 ml/ Insulin Human 

Regular 30 unit/ Total Parenteral Nutrition/Amino Acids/Dextrose/ Fat Emulsion 

Intravenous 1,920 ml @  80 mls/hr TPN  CONT IV  Last administered on 5/1/20at 

01:00;  Start 4/30/20 at 22:00;  Stop 5/1/20 at 21:59;  Status DC


Magnesium Sulfate 50 ml @ 25 mls/hr 1X  ONCE IV  Last administered on 4/30/20at 

17:18;  Start 4/30/20 at 12:45;  Stop 4/30/20 at 14:44;  Status DC


Potassium Chloride/Water 100 ml @  100 mls/hr 1X  ONCE IV  Last administered on 

5/1/20at 11:27;  Start 5/1/20 at 12:00;  Stop 5/1/20 at 12:59;  Status DC


Hydromorphone HCl (Dilaudid Standard PCA) 12 mg STK-MED ONCE IV ;  Start 4/29/20

at 10:50;  Stop 5/1/20 at 11:02;  Status DC


Hydromorphone HCl (Dilaudid Standard PCA) 12 mg STK-MED ONCE IV ;  Start 4/30/20

at 13:47;  Stop 5/1/20 at 11:03;  Status DC


Potassium Acetate 30 meq/Magnesium Sulfate 20 meq/ Calcium Gluconate 10 meq/ 

Multivitamins 10 ml/Chromium/ Copper/Manganese/ Seleni/Zn 0.5 ml/ Insulin Human 

Regular 30 unit/ Potassium Chloride 30 meq/ Total Parenteral Nutrition/Amino 

Acids/Dextrose/ Fat Emulsion Intravenous 1,920 ml @  80 mls/hr TPN  CONT IV  

Last administered on 5/1/20at 22:34;  Start 5/1/20 at 22:00;  Stop 5/2/20 at 

21:59;  Status DC


Potassium Chloride/Water 100 ml @  100 mls/hr Q1H IV  Last administered on 

5/2/20at 13:05;  Start 5/2/20 at 07:00;  Stop 5/2/20 at 10:59;  Status DC


Magnesium Sulfate 50 ml @ 25 mls/hr 1X  ONCE IV  Last administered on 5/2/20at 

10:34;  Start 5/2/20 at 10:30;  Stop 5/2/20 at 12:29;  Status DC


Potassium Chloride 75 meq/ Magnesium Sulfate 20 meq/Calcium Gluconate 10 meq/ 

Multivitamins 10 ml/Chromium/ Copper/Manganese/ Seleni/Zn 0.5 ml/ Insulin Human 

Regular 30 unit/ Total Parenteral Nutrition/Amino Acids/Dextrose/ Fat Emulsion 

Intravenous 1,920 ml @  80 mls/hr TPN  CONT IV  Last administered on 5/2/20at 

21:51;  Start 5/2/20 at 22:00;  Stop 5/3/20 at 22:00;  Status DC


Potassium Chloride 75 meq/ Magnesium Sulfate 20 meq/Calcium Gluconate 10 meq/ 

Multivitamins 10 ml/Chromium/ Copper/Manganese/ Seleni/Zn 0.5 ml/ Insulin Human 

Regular 25 unit/ Total Parenteral Nutrition/Amino Acids/Dextrose/ Fat Emulsion 

Intravenous 1,920 ml @  80 mls/hr TPN  CONT IV  Last administered on 5/3/20at 

22:04;  Start 5/3/20 at 22:00;  Stop 5/4/20 at 21:59;  Status DC


Hydromorphone HCl (Dilaudid) 0.4 mg PRN Q4HRS  PRN IVP PAIN Last administered on

5/4/20at 10:57;  Start 5/4/20 at 09:00;  Stop 5/4/20 at 18:59;  Status DC


Micafungin Sodium 100 mg/Dextrose 100 ml @  100 mls/hr Q24H IV  Last 

administered on 5/26/20at 12:17;  Start 5/4/20 at 11:00;  Stop 5/27/20 at 09:59;

 Status DC


Daptomycin 485 mg/ Sodium Chloride 50 ml @  100 mls/hr Q24H IV  Last 

administered on 5/11/20at 13:10;  Start 5/4/20 at 11:00;  Stop 5/12/20 at 07:44;

 Status DC


Potassium Chloride 75 meq/ Magnesium Sulfate 15 meq/Calcium Gluconate 8 meq/ 

Multivitamins 10 ml/Chromium/ Copper/Manganese/ Seleni/Zn 0.5 ml/ Insulin Human 

Regular 25 unit/ Total Parenteral Nutrition/Amino Acids/Dextrose/ Fat Emulsion 

Intravenous 1,920 ml @  80 mls/hr TPN  CONT IV  Last administered on 5/4/20at 

23:08;  Start 5/4/20 at 22:00;  Stop 5/5/20 at 21:59;  Status DC


Haloperidol Lactate (Haldol Inj) 3 mg 1X  ONCE IVP  Last administered on 

5/4/20at 14:37;  Start 5/4/20 at 14:30;  Stop 5/4/20 at 14:31;  Status DC


Hydromorphone HCl (Dilaudid) 1 mg PRN Q4HRS  PRN IVP PAIN Last administered on 

5/18/20at 06:25;  Start 5/4/20 at 19:00;  Stop 5/18/20 at 17:10;  Status DC


Potassium Chloride 75 meq/ Magnesium Sulfate 15 meq/Calcium Gluconate 8 meq/ 

Multivitamins 10 ml/Chromium/ Copper/Manganese/ Seleni/Zn 0.5 ml/ Insulin Human 

Regular 20 unit/ Total Parenteral Nutrition/Amino Acids/Dextrose/ Fat Emulsion 

Intravenous 1,920 ml @  80 mls/hr TPN  CONT IV  Last administered on 5/5/20at 

22:10;  Start 5/5/20 at 22:00;  Stop 5/6/20 at 21:59;  Status DC


Lidocaine HCl (Buffered Lidocaine 1%) 3 ml STK-MED ONCE .ROUTE ;  Start 5/6/20 a

t 11:31;  Stop 5/6/20 at 11:31;  Status DC


Lidocaine HCl (Buffered Lidocaine 1%) 3 ml STK-MED ONCE .ROUTE ;  Start 5/6/20 

at 12:28;  Stop 5/6/20 at 12:29;  Status DC


Lidocaine HCl (Buffered Lidocaine 1%) 6 ml 1X  ONCE INJ  Last administered on 

5/6/20at 12:53;  Start 5/6/20 at 12:45;  Stop 5/6/20 at 12:46;  Status DC


Potassium Chloride 75 meq/ Magnesium Sulfate 15 meq/Calcium Gluconate 8 meq/ 

Multivitamins 10 ml/Chromium/ Copper/Manganese/ Seleni/Zn 0.5 ml/ Insulin Human 

Regular 20 unit/ Total Parenteral Nutrition/Amino Acids/Dextrose/ Fat Emulsion 

Intravenous 1,920 ml @  80 mls/hr TPN  CONT IV  Last administered on 5/6/20at 

22:00;  Start 5/6/20 at 22:00;  Stop 5/7/20 at 21:59;  Status DC


Potassium Chloride 75 meq/ Magnesium Sulfate 15 meq/Calcium Gluconate 8 meq/ 

Multivitamins 10 ml/Chromium/ Copper/Manganese/ Seleni/Zn 0.5 ml/ Insulin Human 

Regular 15 unit/ Total Parenteral Nutrition/Amino Acids/Dextrose/ Fat Emulsion 

Intravenous 1,920 ml @  80 mls/hr TPN  CONT IV  Last administered on 5/7/20at 

22:28;  Start 5/7/20 at 22:00;  Stop 5/8/20 at 21:59;  Status DC


Vecuronium Bromide (Norcuron Bolus) 6 mg PRN Q6HRS  PRN IV VENT ASYNCHRONY;  

Start 5/7/20 at 19:15;  Stop 5/7/20 at 19:35;  Status DC


Bumetanide (Bumex) 2 mg 1X  ONCE IV  Last administered on 5/7/20at 22:09;  Start

5/7/20 at 19:45;  Stop 5/7/20 at 19:46;  Status DC


Lidocaine HCl (Buffered Lidocaine 1%) 3 ml STK-MED ONCE .ROUTE ;  Start 5/8/20 

at 07:59;  Stop 5/8/20 at 07:59;  Status DC


Midazolam HCl (Versed) 5 mg STK-MED ONCE .ROUTE ;  Start 5/8/20 at 08:36;  Stop 

5/8/20 at 08:36;  Status DC


Fentanyl Citrate (Fentanyl 5ml Vial) 250 mcg STK-MED ONCE .ROUTE ;  Start 5/8/20

at 08:36;  Stop 5/8/20 at 08:37;  Status DC


Lidocaine HCl (Buffered Lidocaine 1%) 3 ml 1X  ONCE IJ  Last administered on 

5/8/20at 09:30;  Start 5/8/20 at 09:15;  Stop 5/8/20 at 09:16;  Status DC


Midazolam HCl (Versed) 5 mg 1X  ONCE IV  Last administered on 5/8/20at 09:30;  

Start 5/8/20 at 09:15;  Stop 5/8/20 at 09:16;  Status DC


Fentanyl Citrate (Fentanyl 5ml Vial) 250 mcg 1X  ONCE IV  Last administered on 

5/8/20at 09:30;  Start 5/8/20 at 09:15;  Stop 5/8/20 at 09:16;  Status DC


Bumetanide (Bumex) 2 mg DAILY IV  Last administered on 5/18/20at 08:07;  Start 

5/8/20 at 10:00;  Stop 5/18/20 at 17:15;  Status DC


Potassium Chloride 75 meq/ Magnesium Sulfate 15 meq/ Multivitamins 10 

ml/Chromium/ Copper/Manganese/ Seleni/Zn 0.5 ml/ Insulin Human Regular 15 unit/ 

Total Parenteral Nutrition/Amino Acids/Dextrose/ Fat Emulsion Intravenous 1,920 

ml @  80 mls/hr TPN  CONT IV  Last administered on 5/8/20at 21:59;  Start 5/8/20

at 22:00;  Stop 5/9/20 at 21:59;  Status DC


Metoclopramide HCl (Reglan Vial) 10 mg PRN Q3HRS  PRN IVP NAUSEA/VOMITING-3rd 

choice Last administered on 5/14/20at 04:25;  Start 5/9/20 at 16:45


Potassium Chloride 75 meq/ Magnesium Sulfate 15 meq/ Multivitamins 10 

ml/Chromium/ Copper/Manganese/ Seleni/Zn 0.5 ml/ Insulin Human Regular 15 unit/ 

Total Parenteral Nutrition/Amino Acids/Dextrose/ Fat Emulsion Intravenous 1,920 

ml @  80 mls/hr TPN  CONT IV  Last administered on 5/9/20at 22:41;  Start 5/9/20

at 22:00;  Stop 5/10/20 at 21:59;  Status DC


Magnesium Sulfate 50 ml @ 25 mls/hr 1X  ONCE IV  Last administered on 5/10/20at 

10:44;  Start 5/10/20 at 09:00;  Stop 5/10/20 at 10:59;  Status DC


Potassium Chloride/Water 100 ml @  100 mls/hr 1X  ONCE IV  Last administered on 

5/10/20at 09:37;  Start 5/10/20 at 09:00;  Stop 5/10/20 at 09:59;  Status DC


Duloxetine HCl (Cymbalta) 30 mg DAILY PO  Last administered on 5/11/20at 09:48; 

Start 5/10/20 at 14:00;  Stop 5/13/20 at 10:25;  Status DC


Potassium Chloride 80 meq/ Magnesium Sulfate 20 meq/ Multivitamins 10 

ml/Chromium/ Copper/Manganese/ Seleni/Zn 0.5 ml/ Insulin Human Regular 15 unit/ 

Total Parenteral Nutrition/Amino Acids/Dextrose/ Fat Emulsion Intravenous 1,920 

ml @  80 mls/hr TPN  CONT IV  Last administered on 5/10/20at 21:42;  Start 

5/10/20 at 22:00;  Stop 5/11/20 at 21:59;  Status DC


Potassium Chloride 80 meq/ Magnesium Sulfate 20 meq/ Multivitamins 10 

ml/Chromium/ Copper/Manganese/ Seleni/Zn 0.5 ml/ Insulin Human Regular 15 unit/ 

Total Parenteral Nutrition/Amino Acids/Dextrose/ Fat Emulsion Intravenous 1,920 

ml @  80 mls/hr TPN  CONT IV  Last administered on 5/11/20at 22:20;  Start 

5/11/20 at 22:00;  Stop 5/12/20 at 21:59;  Status DC


Lidocaine HCl (Buffered Lidocaine 1%) 3 ml STK-MED ONCE .ROUTE ;  Start 5/12/20 

at 09:54;  Stop 5/12/20 at 09:55;  Status DC


Hydromorphone HCl (Dilaudid Standard PCA) 12 mg STK-MED ONCE IV ;  Start 5/1/20 

at 15:50;  Stop 5/12/20 at 11:24;  Status DC


Potassium Chloride 80 meq/ Magnesium Sulfate 20 meq/ Multivitamins 10 ml

/Chromium/ Copper/Manganese/ Seleni/Zn 0.5 ml/ Insulin Human Regular 15 unit/ 

Total Parenteral Nutrition/Amino Acids/Dextrose/ Fat Emulsion Intravenous 1,920 

ml @  80 mls/hr TPN  CONT IV  Last administered on 5/12/20at 21:40;  Start 

5/12/20 at 22:00;  Stop 5/13/20 at 21:59;  Status DC


Lidocaine HCl (Buffered Lidocaine 1%) 6 ml 1X  ONCE INJ  Last administered on 

5/12/20at 14:15;  Start 5/12/20 at 14:15;  Stop 5/12/20 at 14:16;  Status DC


Potassium Chloride 80 meq/ Magnesium Sulfate 20 meq/ Multivitamins 10 m

l/Chromium/ Copper/Manganese/ Seleni/Zn 1 ml/ Insulin Human Regular 15 unit/ 

Total Parenteral Nutrition/Amino Acids/Dextrose/ Fat Emulsion Intravenous 1,920 

ml @  80 mls/hr TPN  CONT IV  Last administered on 5/13/20at 22:04;  Start 

5/13/20 at 22:00;  Stop 5/14/20 at 21:59;  Status DC


Potassium Chloride/Water 100 ml @  100 mls/hr 1X  ONCE IV  Last administered on 

5/14/20at 11:34;  Start 5/14/20 at 11:00;  Stop 5/14/20 at 11:59;  Status DC


Potassium Chloride 90 meq/ Magnesium Sulfate 20 meq/ Multivitamins 10 

ml/Chromium/ Copper/Manganese/ Seleni/Zn 1 ml/ Insulin Human Regular 15 unit/ 

Total Parenteral Nutrition/Amino Acids/Dextrose/ Fat Emulsion Intravenous 1,920 

ml @  80 mls/hr TPN  CONT IV  Last administered on 5/14/20at 22:57;  Start 

5/14/20 at 22:00;  Stop 5/15/20 at 21:59;  Status DC


Potassium Chloride 90 meq/ Magnesium Sulfate 20 meq/ Multivitamins 10 

ml/Chromium/ Copper/Manganese/ Seleni/Zn 1 ml/ Insulin Human Regular 15 unit/ 

Total Parenteral Nutrition/Amino Acids/Dextrose/ Fat Emulsion Intravenous 1,920 

ml @  80 mls/hr TPN  CONT IV  Last administered on 5/15/20at 22:48;  Start 

5/15/20 at 22:00;  Stop 5/16/20 at 21:59;  Status DC


Potassium Chloride 90 meq/ Magnesium Sulfate 20 meq/ Multivitamins 10 

ml/Chromium/ Copper/Manganese/ Seleni/Zn 1 ml/ Insulin Human Regular 15 unit/ 

Total Parenteral Nutrition/Amino Acids/Dextrose/ Fat Emulsion Intravenous 1,890 

ml @  78.75 mls/ hr TPN  CONT IV  Last administered on 5/16/20at 22:15;  Start 

5/16/20 at 22:00;  Stop 5/17/20 at 21:59;  Status DC


Linezolid/Dextrose 300 ml @  300 mls/hr Q12HR IV  Last administered on 5/19/20at

21:08;  Start 5/17/20 at 09:00;  Stop 5/20/20 at 08:11;  Status DC


Daptomycin 450 mg/ Sodium Chloride 50 ml @  100 mls/hr Q24H IV  Last 

administered on 5/20/20at 09:25;  Start 5/17/20 at 09:00;  Stop 5/21/20 at 

08:30;  Status DC


Potassium Chloride 90 meq/ Magnesium Sulfate 20 meq/ Multivitamins 10 

ml/Chromium/ Copper/Manganese/ Seleni/Zn 1 ml/ Insulin Human Regular 15 unit/ 

Total Parenteral Nutrition/Amino Acids/Dextrose/ Fat Emulsion Intravenous 1,890 

ml @  78.75 mls/ hr TPN  CONT IV  Last administered on 5/17/20at 21:34;  Start 

5/17/20 at 22:00;  Stop 5/18/20 at 21:59;  Status DC


Lorazepam (Ativan Inj) 2 mg STK-MED ONCE .ROUTE ;  Start 5/17/20 at 14:58;  Stop

5/17/20 at 14:58;  Status DC


Metoprolol Tartrate (Lopressor Vial) 5 mg 1X  ONCE IVP  Last administered on 

5/17/20at 15:31;  Start 5/17/20 at 15:15;  Stop 5/17/20 at 15:16;  Status DC


Lorazepam (Ativan Inj) 2 mg 1X  ONCE IVP  Last administered on 5/17/20at 15:30; 

Start 5/17/20 at 15:15;  Stop 5/17/20 at 15:16;  Status DC


Enoxaparin Sodium (Lovenox 40mg Syringe) 40 mg Q24H SQ  Last administered on 

6/5/20at 17:44;  Start 5/17/20 at 17:00;  Stop 6/7/20 at 06:50;  Status DC


Lorazepam (Ativan Inj) 1 mg PRN Q4HRS  PRN IVP ANXIETY / AGITATION MILD-MOD Last

administered on 5/31/20at 15:55;  Start 5/17/20 at 19:15;  Stop 6/2/20 at 11:45;

 Status DC


Lorazepam (Ativan Inj) 2 mg PRN Q4HRS  PRN IVP ANXIETY / AGITATION SEVERE Last 

administered on 6/1/20at 07:55;  Start 5/17/20 at 19:15;  Stop 6/2/20 at 11:45; 

Status DC


Fentanyl Citrate (Fentanyl 2ml Vial) 50 mcg PRN Q4HRS  PRN IVP SEVERE PAIN Last 

administered on 6/13/20at 05:15;  Start 5/18/20 at 13:15;  Stop 6/14/20 at 

09:29;  Status DC


Fentanyl Citrate (Fentanyl 2ml Vial) 25 mcg PRN Q4HRS  PRN IVP MODERATE PAIN 

Last administered on 6/13/20at 00:27;  Start 5/18/20 at 13:15;  Stop 6/14/20 at 

09:30;  Status DC


Potassium Chloride 90 meq/ Magnesium Sulfate 20 meq/ Multivitamins 10 

ml/Chromium/ Copper/Manganese/ Seleni/Zn 1 ml/ Insulin Human Regular 15 unit/ 

Total Parenteral Nutrition/Amino Acids/Dextrose/ Fat Emulsion Intravenous 1,890 

ml @  78.75 mls/ hr TPN  CONT IV  Last administered on 5/18/20at 22:18;  Start 

5/18/20 at 22:00;  Stop 5/19/20 at 21:59;  Status DC


Furosemide (Lasix) 40 mg 1X  ONCE IVP  Last administered on 5/18/20at 21:51;  

Start 5/18/20 at 21:45;  Stop 5/18/20 at 21:48;  Status DC


Albumin Human 100 ml @  100 mls/hr 1X PRN  PRN IV SEE COMMENTS;  Start 5/19/20 

at 01:30


Furosemide (Lasix) 40 mg [id number] IVP  Last administered on 6/3/20at 08:04;  Start 

5/19/20 at 14:00;  Stop 6/3/20 at 13:07;  Status DC


Potassium Chloride 90 meq/ Magnesium Sulfate 20 meq/ Multivitamins 10 ml/Chr

omium/ Copper/Manganese/ Seleni/Zn 1 ml/ Insulin Human Regular 15 unit/ Total 

Parenteral Nutrition/Amino Acids/Dextrose/ Fat Emulsion Intravenous 1,800 ml @  

75 mls/hr TPN  CONT IV  Last administered on 5/19/20at 22:31;  Start 5/19/20 at 

22:00;  Stop 5/20/20 at 21:59;  Status DC


Potassium Chloride 90 meq/ Magnesium Sulfate 20 meq/ Multivitamins 10 

ml/Chromium/ Copper/Manganese/ Seleni/Zn 1 ml/ Insulin Human Regular 15 unit/ 

Total Parenteral Nutrition/Amino Acids/Dextrose/ Fat Emulsion Intravenous 1,800 

ml @  75 mls/hr TPN  CONT IV  Last administered on 5/20/20at 22:28;  Start 

5/20/20 at 22:00;  Stop 5/21/20 at 21:59;  Status DC


Potassium Chloride 110 meq/ Magnesium Sulfate 20 meq/ Multivitamins 10 

ml/Chromium/ Copper/Manganese/ Seleni/Zn 1 ml/ Insulin Human Regular 15 unit/ 

Total Parenteral Nutrition/Amino Acids/Dextrose/ Fat Emulsion Intravenous 1,800 

ml @  75 mls/hr TPN  CONT IV  Last administered on 5/21/20at 22:01;  Start 

5/21/20 at 22:00;  Stop 5/22/20 at 21:59;  Status DC


Saliva Substitute (Biotene Moisturizing Mouth) 2 spray PRN Q15MIN  PRN PO DRY 

MOUTH;  Start 5/21/20 at 11:00


Potassium Chloride 110 meq/ Magnesium Sulfate 20 meq/ Multivitamins 10 

ml/Chromium/ Copper/Manganese/ Seleni/Zn 1 ml/ Insulin Human Regular 15 unit/ 

Total Parenteral Nutrition/Amino Acids/Dextrose/ Fat Emulsion Intravenous 1,800 

ml @  75 mls/hr TPN  CONT IV  Last administered on 5/22/20at 22:21;  Start 5/ 22/20 at 22:00;  Stop 5/23/20 at 21:59;  Status DC


Potassium Chloride 110 meq/ Magnesium Sulfate 20 meq/ Multivitamins 10 ml/Chr

omium/ Copper/Manganese/ Seleni/Zn 1 ml/ Insulin Human Regular 15 unit/ Total 

Parenteral Nutrition/Amino Acids/Dextrose/ Fat Emulsion Intravenous 1,800 ml @  

75 mls/hr TPN  CONT IV  Last administered on 5/23/20at 22:04;  Start 5/23/20 at 

22:00;  Stop 5/24/20 at 21:59;  Status DC


Potassium Chloride 110 meq/ Magnesium Sulfate 20 meq/ Multivitamins 10 

ml/Chromium/ Copper/Manganese/ Seleni/Zn 1 ml/ Insulin Human Regular 15 unit/ 

Total Parenteral Nutrition/Amino Acids/Dextrose/ Fat Emulsion Intravenous 1,800 

ml @  75 mls/hr TPN  CONT IV  Last administered on 5/24/20at 22:48;  Start 

5/24/20 at 22:00;  Stop 5/25/20 at 21:59;  Status DC


Potassium Chloride 70 meq/ Magnesium Sulfate 20 meq/ Multivitamins 10 

ml/Chromium/ Copper/Manganese/ Seleni/Zn 1 ml/ Insulin Human Regular 15 unit/ 

Total Parenteral Nutrition/Amino Acids/Dextrose/ Fat Emulsion Intravenous 1,800 

ml @  75 mls/hr TPN  CONT IV  Last administered on 5/25/20at 21:39;  Start 

5/25/20 at 22:00;  Stop 5/26/20 at 21:59;  Status DC


Meropenem 500 mg/ Sodium Chloride 50 ml @  100 mls/hr Q6HRS IV  Last 

administered on 5/27/20at 06:02;  Start 5/25/20 at 18:00;  Stop 5/27/20 at 

09:59;  Status DC


Barium Sulfate (Varibar Thin Liquid Apple) 148 gm 1X  ONCE PO ;  Start 5/26/20 

at 11:45;  Stop 5/26/20 at 11:49;  Status DC


Potassium Chloride 70 meq/ Magnesium Sulfate 20 meq/ Multivitamins 10 

ml/Chromium/ Copper/Manganese/ Seleni/Zn 1 ml/ Insulin Human Regular 15 unit/ 

Total Parenteral Nutrition/Amino Acids/Dextrose/ Fat Emulsion Intravenous 1,800 

ml @  75 mls/hr TPN  CONT IV  Last administered on 5/26/20at 22:27;  Start 

5/26/20 at 22:00;  Stop 5/27/20 at 21:59;  Status DC


Piperacillin Sod/ Tazobactam Sod 3.375 gm/Sodium Chloride 50 ml @  100 mls/hr 

Q6HRS IV  Last administered on 6/4/20at 06:10;  Start 5/27/20 at 12:00;  Stop 6 /4/20 at 07:26;  Status DC


Potassium Chloride 70 meq/ Magnesium Sulfate 20 meq/ Multivitamins 10 

ml/Chromium/ Copper/Manganese/ Seleni/Zn 1 ml/ Insulin Human Regular 15 unit/ 

Total Parenteral Nutrition/Amino Acids/Dextrose/ Fat Emulsion Intravenous 1,800 

ml @  75 mls/hr TPN  CONT IV  Last administered on 5/27/20at 22:03;  Start 

5/27/20 at 22:00;  Stop 5/28/20 at 21:59;  Status DC


Potassium Chloride 70 meq/ Magnesium Sulfate 20 meq/ Multivitamins 10 

ml/Chromium/ Copper/Manganese/ Seleni/Zn 1 ml/ Insulin Human Regular 15 unit/ 

Total Parenteral Nutrition/Amino Acids/Dextrose/ Fat Emulsion Intravenous 1,800 

ml @  75 mls/hr TPN  CONT IV  Last administered on 5/28/20at 22:33;  Start 

5/28/20 at 22:00;  Stop 5/29/20 at 21:59;  Status DC


Potassium Chloride 70 meq/ Magnesium Sulfate 20 meq/ Multivitamins 10 

ml/Chromium/ Copper/Manganese/ Seleni/Zn 1 ml/ Insulin Human Regular 15 unit/ 

Total Parenteral Nutrition/Amino Acids/Dextrose/ Fat Emulsion Intravenous 1,800 

ml @  75 mls/hr TPN  CONT IV  Last administered on 5/29/20at 23:13;  Start 

5/29/20 at 22:00;  Stop 5/30/20 at 21:59;  Status DC


Potassium Chloride 80 meq/ Magnesium Sulfate 20 meq/ Multivitamins 10 

ml/Chromium/ Copper/Manganese/ Seleni/Zn 1 ml/ Insulin Human Regular 15 unit/ 

Total Parenteral Nutrition/Amino Acids/Dextrose/ Fat Emulsion Intravenous 1,800 

ml @  75 mls/hr TPN  CONT IV  Last administered on 5/30/20at 22:30;  Start 

5/30/20 at 22:00;  Stop 5/31/20 at 21:59;  Status DC


Potassium Chloride 80 meq/ Magnesium Sulfate 20 meq/ Multivitamins 10 

ml/Chromium/ Copper/Manganese/ Seleni/Zn 1 ml/ Insulin Human Regular 15 unit/ 

Total Parenteral Nutrition/Amino Acids/Dextrose/ Fat Emulsion Intravenous 1,800 

ml @  75 mls/hr TPN  CONT IV  Last administered on 5/31/20at 21:54;  Start 

5/31/20 at 22:00;  Stop 6/1/20 at 21:59;  Status DC


Potassium Chloride/Water 100 ml @  100 mls/hr 1X  ONCE IV  Last administered on 

6/1/20at 10:15;  Start 6/1/20 at 10:00;  Stop 6/1/20 at 10:59;  Status DC


Potassium Chloride 90 meq/ Magnesium Sulfate 20 meq/ Multivitamins 10 

ml/Chromium/ Copper/Manganese/ Seleni/Zn 1 ml/ Insulin Human Regular 20 unit/ 

Total Parenteral Nutrition/Amino Acids/Dextrose/ Fat Emulsion Intravenous 1,800 

ml @  75 mls/hr TPN  CONT IV  Last administered on 6/1/20at 22:28;  Start 6/1/20

at 22:00;  Stop 6/2/20 at 21:59;  Status DC


Potassium Chloride 90 meq/ Magnesium Sulfate 20 meq/ Multivitamins 10 

ml/Chromium/ Copper/Manganese/ Seleni/Zn 1 ml/ Insulin Human Regular 20 unit/ 

Total Parenteral Nutrition/Amino Acids/Dextrose/ Fat Emulsion Intravenous 1,800 

ml @  75 mls/hr TPN  CONT IV  Last administered on 6/2/20at 22:08;  Start 6/2/20

at 22:00;  Stop 6/3/20 at 21:59;  Status DC


Lorazepam (Ativan Inj) 0.25 mg PRN Q4HRS  PRN IVP ANXIETY / AGITATION Last 

administered on 6/13/20at 02:25;  Start 6/3/20 at 07:30


Potassium Chloride 90 meq/ Magnesium Sulfate 20 meq/ Multivitamins 10 

ml/Chromium/ Copper/Manganese/ Seleni/Zn 1 ml/ Insulin Human Regular 20 unit/ 

Total Parenteral Nutrition/Amino Acids/Dextrose/ Fat Emulsion Intravenous 1,800 

ml @  75 mls/hr TPN  CONT IV  Last administered on 6/3/20at 23:13;  Start 6/3/20

at 22:00;  Stop 6/4/20 at 21:59;  Status DC


Furosemide (Lasix) 40 mg DAILY IVP  Last administered on 6/5/20at 11:14;  Start 

6/3/20 at 13:30;  Stop 6/7/20 at 09:12;  Status DC


Fluoxetine HCl (PROzac) 20 mg QHS PEG  Last administered on 6/24/20at 21:41;  

Start 6/4/20 at 21:00


Fentanyl (Duragesic 50mcg/ Hr Patch) 1 patch Q72H TD  Last administered on 

6/4/20at 21:22;  Start 6/4/20 at 21:00;  Stop 6/13/20 at 12:00;  Status DC


Potassium Chloride 40 meq/ Potassium Acetate 60 meq/Magnesium Sulfate 10 meq/ 

Multivitamins 10 ml/Chromium/ Copper/Manganese/ Seleni/Zn 1 ml/ Insulin Human 

Regular 20 unit/ Total Parenteral Nutrition/Amino Acids/Dextrose/ Fat Emulsion 

Intravenous 1,800 ml @  75 mls/hr TPN  CONT IV  Last administered on 6/5/20at 

00:03;  Start 6/4/20 at 22:00;  Stop 6/5/20 at 21:59;  Status DC


Potassium Acetate 80 meq/Magnesium Sulfate 5 meq/ Multivitamins 10 ml/Chromium/ 

Copper/Manganese/ Seleni/Zn 1 ml/ Insulin Human Regular 20 unit/ Total 

Parenteral Nutrition/Amino Acids/Dextrose/ Fat Emulsion Intravenous 1,920 ml @  

80 mls/hr TPN  CONT IV  Last administered on 6/5/20at 21:59;  Start 6/5/20 at 

22:00;  Stop 6/6/20 at 21:59;  Status DC


Potassium Acetate 60 meq/Magnesium Sulfate 5 meq/ Multivitamins 10 ml/Chromium/ 

Copper/Manganese/ Seleni/Zn 1 ml/ Insulin Human Regular 30 unit/ Total 

Parenteral Nutrition/Amino Acids/Dextrose/ Fat Emulsion Intravenous 1,920 ml @  

80 mls/hr TPN  CONT IV  Last administered on 6/6/20at 21:54;  Start 6/6/20 at 

22:00;  Stop 6/7/20 at 21:59;  Status DC


Norepinephrine Bitartrate 8 mg/ Dextrose 258 ml @  13.332 mls/ hr CONT  PRN IV 

PER PROTOCOL Last administered on 6/7/20at 21:46;  Start 6/7/20 at 06:30


Albumin Human 500 ml @  125 mls/hr 1X  ONCE IV  Last administered on 6/7/20at 

08:10;  Start 6/7/20 at 08:15;  Stop 6/7/20 at 12:14;  Status DC


Potassium Acetate 40 meq/Magnesium Sulfate 5 meq/ Multivitamins 10 ml/Chromium/ 

Copper/Manganese/ Seleni/Zn 1 ml/ Insulin Human Regular 30 unit/ Total 

Parenteral Nutrition/Amino Acids/Dextrose/ Fat Emulsion Intravenous 1,920 ml @  

80 mls/hr TPN  CONT IV  Last administered on 6/7/20at 22:23;  Start 6/7/20 at 

22:00;  Stop 6/8/20 at 21:59;  Status DC


Meropenem 1 gm/ Sodium Chloride 100 ml @  200 mls/hr Q8HRS IV ;  Start 6/7/20 at

14:00;  Status Cancel


Meropenem 1 gm/ Sodium Chloride 100 ml @  200 mls/hr Q8HRS IV  Last administered

on 6/7/20at 11:04;  Start 6/7/20 at 10:00;  Stop 6/7/20 at 13:00;  Status DC


Meropenem 1 gm/ Sodium Chloride 100 ml @  200 mls/hr Q12HR IV  Last administered

on 6/25/20at 08:27;  Start 6/7/20 at 21:00;  Stop 6/25/20 at 08:56;  Status DC


Sodium Chloride 1,000 ml @  1,000 mls/hr 1X  ONCE IV  Last administered on 

6/7/20at 11:06;  Start 6/7/20 at 10:45;  Stop 6/7/20 at 11:44;  Status DC


Micafungin Sodium 100 mg/Dextrose 100 ml @  100 mls/hr Q24H IV  Last 

administered on 6/24/20at 12:34;  Start 6/7/20 at 11:00;  Stop 6/25/20 at 08:56;

 Status DC


Daptomycin 410 mg/ Sodium Chloride 50 ml @  100 mls/hr Q24H IV  Last 

administered on 6/9/20at 13:33;  Start 6/7/20 at 14:00;  Stop 6/10/20 at 08:30; 

Status DC


Midazolam HCl (Versed) 2 mg STK-MED ONCE .ROUTE ;  Start 6/7/20 at 14:47;  Stop 

6/7/20 at 14:48;  Status DC


Fentanyl Citrate (Fentanyl 2ml Vial) 100 mcg STK-MED ONCE .ROUTE ;  Start 6/7/20

at 14:47;  Stop 6/7/20 at 14:48;  Status DC


Flumazenil (Romazicon) 0.5 mg STK-MED ONCE IV ;  Start 6/7/20 at 14:48;  Stop 

6/7/20 at 14:48;  Status DC


Naloxone HCl (Narcan) 0.4 mg STK-MED ONCE .ROUTE ;  Start 6/7/20 at 14:48;  Stop

6/7/20 at 14:48;  Status DC


Lidocaine HCl (Lidocaine 1% 20ml Vial) 20 ml STK-MED ONCE .ROUTE ;  Start 6/7/20

at 14:48;  Stop 6/7/20 at 14:48;  Status DC


Midazolam HCl (Versed) 2 mg 1X  ONCE IV  Last administered on 6/7/20at 15:28;  

Start 6/7/20 at 15:00;  Stop 6/7/20 at 15:01;  Status DC


Fentanyl Citrate (Fentanyl 2ml Vial) 100 mcg 1X  ONCE IV  Last administered on 

6/7/20at 15:28;  Start 6/7/20 at 15:00;  Stop 6/7/20 at 15:01;  Status DC


Lidocaine HCl (Lidocaine 1% 20ml Vial) 20 ml 1X  ONCE INJ  Last administered on 

6/7/20at 15:30;  Start 6/7/20 at 15:00;  Stop 6/7/20 at 15:01;  Status DC


Sodium Chloride 1,000 ml @  100 mls/hr Q10H IV  Last administered on 6/16/20at 

07:30;  Start 6/7/20 at 20:00;  Stop 6/16/20 at 11:26;  Status DC


Sodium Bicarbonate (Sodium Bicarb Adult 8.4% Syr) 50 meq 1X  ONCE IV  Last 

administered on 6/7/20at 21:47;  Start 6/7/20 at 22:00;  Stop 6/7/20 at 22:01;  

Status DC


Potassium Acetate 40 meq/Magnesium Sulfate 5 meq/ Multivitamins 10 ml/Chromium/ 

Copper/Manganese/ Seleni/Zn 1 ml/ Insulin Human Regular 30 unit/ Total 

Parenteral Nutrition/Amino Acids/Dextrose/ Fat Emulsion Intravenous 1,920 ml @  

80 mls/hr TPN  CONT IV  Last administered on 6/8/20at 22:28;  Start 6/8/20 at 

22:00;  Stop 6/9/20 at 21:59;  Status DC


Sodium Chloride 500 ml @  500 mls/hr 1X  ONCE IV  Last administered on 6/9/20at 

06:39;  Start 6/9/20 at 06:45;  Stop 6/9/20 at 07:44;  Status DC


Potassium Acetate 40 meq/Magnesium Sulfate 5 meq/ Multivitamins 10 ml/Chromium/ 

Copper/Manganese/ Seleni/Zn 1 ml/ Insulin Human Regular 30 unit/ Total 

Parenteral Nutrition/Amino Acids/Dextrose/ Fat Emulsion Intravenous 1,920 ml @  

80 mls/hr TPN  CONT IV  Last administered on 6/9/20at 22:03;  Start 6/9/20 at 

22:00;  Stop 6/10/20 at 21:59;  Status DC


Metoprolol Tartrate (Lopressor Vial) 5 mg PRN Q6HRS  PRN IVP HYPERTENSION Last 

administered on 6/18/20at 10:14;  Start 6/10/20 at 09:00


Potassium Acetate 40 meq/Magnesium Sulfate 5 meq/ Multivitamins 10 ml/Chromium/ 

Copper/Manganese/ Seleni/Zn 1 ml/ Insulin Human Regular 30 unit/ Total 

Parenteral Nutrition/Amino Acids/Dextrose/ Fat Emulsion Intravenous 1,920 ml @  

80 mls/hr TPN  CONT IV  Last administered on 6/10/20at 21:26;  Start 6/10/20 at 

22:00;  Stop 6/11/20 at 21:59;  Status DC


Potassium Acetate 40 meq/Magnesium Sulfate 5 meq/ Multivitamins 10 ml/Chromium/ 

Copper/Manganese/ Seleni/Zn 1 ml/ Insulin Human Regular 30 unit/ Total Par

enteral Nutrition/Amino Acids/Dextrose/ Fat Emulsion Intravenous 1,920 ml @  80 

mls/hr TPN  CONT IV  Last administered on 6/11/20at 23:23;  Start 6/11/20 at 

22:00;  Stop 6/12/20 at 21:59;  Status DC


Potassium Acetate 40 meq/Magnesium Sulfate 5 meq/ Multivitamins 10 ml/Chromium/ 

Copper/Manganese/ Seleni/Zn 1 ml/ Insulin Human Regular 30 unit/ Total 

Parenteral Nutrition/Amino Acids/Dextrose/ Fat Emulsion Intravenous 1,920 ml @  

80 mls/hr TPN  CONT IV  Last administered on 6/12/20at 21:35;  Start 6/12/20 at 

22:00;  Stop 6/13/20 at 21:59;  Status DC


Furosemide (Lasix) 20 mg 1X  ONCE IVP  Last administered on 6/13/20at 06:26;  

Start 6/13/20 at 06:15;  Stop 6/13/20 at 06:16;  Status DC


Methylprednisolone Sodium Succinate (SOLU-Medrol 125MG VIAL) 125 mg 1X  ONCE IV 

Last administered on 6/13/20at 06:26;  Start 6/13/20 at 06:15;  Stop 6/13/20 at 

06:16;  Status DC


Albuterol/ Ipratropium (Duoneb) 3 ml Q4HRS NEB  Last administered on 6/25/20at 

04:00;  Start 6/13/20 at 08:00


Fentanyl Citrate 30 ml @ 0 mls/hr CONT  PRN IV SEE PROTOCOL Last administered on

6/24/20at 21:02;  Start 6/13/20 at 06:00


Propofol 100 ml @ 0 mls/hr CONT  PRN IV SEE PROTOCOL Last administered on 

6/20/20at 23:50;  Start 6/13/20 at 06:00


Fentanyl Citrate (Fentanyl 2ml Vial) 25 mcg PRN Q1HR  PRN IV SEE COMMENTS;  

Start 6/13/20 at 06:00


Fentanyl Citrate (Fentanyl 2ml Vial) 50 mcg PRN Q1HR  PRN IV SEE COMMENTS;  

Start 6/13/20 at 06:00


Chlorhexidine Gluconate (Peridex) 15 ml BID MM ;  Start 6/13/20 at 09:00;  Stop 

6/13/20 at 07:58;  Status DC


Potassium Acetate 40 meq/Magnesium Sulfate 5 meq/ Multivitamins 10 ml/Chromium/ 

Copper/Manganese/ Seleni/Zn 1 ml/ Insulin Human Regular 30 unit/ Total 

Parenteral Nutrition/Amino Acids/Dextrose/ Fat Emulsion Intravenous 1,920 ml @  

80 mls/hr TPN  CONT IV  Last administered on 6/13/20at 21:19;  Start 6/13/20 at 

22:00;  Stop 6/14/20 at 21:59;  Status DC


Acetylcysteine (Mucomyst 20% Resp Treatment) 600 mg BID NEB  Last administered 

on 6/19/20at 09:33;  Start 6/13/20 at 21:00;  Stop 6/19/20 at 10:39;  Status DC


Magnesium Sulfate 100 ml @  25 mls/hr 1X  ONCE IV  Last administered on 

6/13/20at 15:48;  Start 6/13/20 at 15:45;  Stop 6/13/20 at 19:44;  Status DC


Potassium Acetate 40 meq/Magnesium Sulfate 5 meq/ Multivitamins 10 ml/Chromium/ 

Copper/Manganese/ Seleni/Zn 1 ml/ Insulin Human Regular 30 unit/ Total 

Parenteral Nutrition/Amino Acids/Dextrose/ Fat Emulsion Intravenous 1,920 ml @  

80 mls/hr TPN  CONT IV  Last administered on 6/14/20at 21:35;  Start 6/14/20 at 

22:00;  Stop 6/15/20 at 21:59;  Status DC


Potassium Chloride/Water 100 ml @  100 mls/hr Q1H IV  Last administered on 

6/15/20at 08:31;  Start 6/15/20 at 07:00;  Stop 6/15/20 at 08:59;  Status DC


Potassium Acetate 40 meq/Magnesium Sulfate 5 meq/ Multivitamins 10 ml/Chromium/ 

Copper/Manganese/ Seleni/Zn 1 ml/ Insulin Human Regular 30 unit/ Total 

Parenteral Nutrition/Amino Acids/Dextrose/ Fat Emulsion Intravenous 1,920 ml @  

80 mls/hr TPN  CONT IV  Last administered on 6/15/20at 21:54;  Start 6/15/20 at 

22:00;  Stop 6/16/20 at 19:34;  Status DC


Lidocaine HCl (Buffered Lidocaine 1%) 3 ml STK-MED ONCE .ROUTE ;  Start 6/15/20 

at 12:14;  Stop 6/15/20 at 12:14;  Status DC


Lidocaine HCl (Buffered Lidocaine 1%) 3 ml 1X  ONCE IJ  Last administered on 

6/15/20at 13:11;  Start 6/15/20 at 13:00;  Stop 6/15/20 at 13:01;  Status DC


Magnesium Sulfate 50 ml @ 25 mls/hr 1X  ONCE IV ;  Start 6/16/20 at 08:15;  Stop

6/16/20 at 10:14;  Status DC


Potassium Acetate 40 meq/Magnesium Sulfate 10 meq/ Multivitamins 10 ml/Chromium/

Copper/Manganese/ Seleni/Zn 1 ml/ Insulin Human Regular 20 unit/ Total Pa

renteral Nutrition/Amino Acids/Dextrose/ Fat Emulsion Intravenous 1,920 ml @  80

mls/hr TPN  CONT IV  Last administered on 6/16/20at 21:32;  Start 6/16/20 at 

22:00;  Stop 6/17/20 at 21:59;  Status DC


Potassium Chloride/Water 100 ml @  100 mls/hr Q1H IV  Last administered on 

6/17/20at 09:12;  Start 6/17/20 at 08:00;  Stop 6/17/20 at 09:59;  Status DC


Alteplase, Recombinant (Cathflo For Central Catheter Clearance) 4 mg 1X  ONCE 

INT CAT ;  Start 6/17/20 at 09:15;  Stop 6/17/20 at 09:16;  Status UNV


Alteplase, Recombinant (Cathflo For Central Catheter Clearance) 4 mg 1X  ONCE 

INT CAT ;  Start 6/17/20 at 09:15;  Stop 6/17/20 at 09:16;  Status UNV


Alteplase, Recombinant (Cathflo For Central Catheter Clearance) 4 mg 1X  ONCE 

INT CAT ;  Start 6/17/20 at 09:15;  Stop 6/17/20 at 09:16;  Status UNV


Alteplase, Recombinant 4 mg/ Sodium Chloride 20 ml @ 20 mls/hr 1X  ONCE IV  Last

administered on 6/17/20at 10:10;  Start 6/17/20 at 10:00;  Stop 6/17/20 at 

10:59;  Status DC


Alteplase, Recombinant 4 mg/ Sodium Chloride 20 ml @ 20 mls/hr 1X  ONCE IV  Last

administered on 6/17/20at 10:09;  Start 6/17/20 at 10:00;  Stop 6/17/20 at 

10:59;  Status DC


Alteplase, Recombinant 4 mg/ Sodium Chloride 20 ml @ 20 mls/hr 1X  ONCE IV  Last

administered on 6/17/20at 10:09;  Start 6/17/20 at 10:00;  Stop 6/17/20 at 

10:59;  Status DC


Potassium Acetate 60 meq/Magnesium Sulfate 10 meq/ Multivitamins 10 ml/Chromium/

Copper/Manganese/ Seleni/Zn 1 ml/ Insulin Human Regular 20 unit/ Total 

Parenteral Nutrition/Amino Acids/Dextrose/ Fat Emulsion Intravenous 1,920 ml @  

80 mls/hr TPN  CONT IV  Last administered on 6/17/20at 21:55;  Start 6/17/20 at 

22:00;  Stop 6/18/20 at 21:59;  Status DC


Albumin Human 500 ml @  125 mls/hr 1X  ONCE IV  Last administered on 6/18/20at 

12:01;  Start 6/18/20 at 11:15;  Stop 6/18/20 at 15:14;  Status DC


Sodium Chloride 500 ml @  500 mls/hr 1X  ONCE IV  Last administered on 6/18/20at

13:50;  Start 6/18/20 at 11:15;  Stop 6/18/20 at 12:14;  Status DC


Potassium Acetate 60 meq/Magnesium Sulfate 14 meq/ Multivitamins 10 ml/Chromium/

Copper/Manganese/ Seleni/Zn 1 ml/ Insulin Human Regular 20 unit/ Total 

Parenteral Nutrition/Amino Acids/Dextrose/ Fat Emulsion Intravenous 1,920 ml @  

80 mls/hr TPN  CONT IV  Last administered on 6/18/20at 22:26;  Start 6/18/20 at 

22:00;  Stop 6/19/20 at 21:59;  Status DC


Ciprofloxacin/ Dextrose 200 ml @  200 mls/hr Q12HR IV  Last administered on 

6/25/20at 08:27;  Start 6/18/20 at 21:00;  Stop 6/25/20 at 08:56;  Status DC


Albumin Human 250 ml @  62.5 mls/hr 1X  ONCE IV  Last administered on 6/19/20at 

11:09;  Start 6/19/20 at 11:00;  Stop 6/19/20 at 14:59;  Status DC


Furosemide (Lasix) 20 mg 1X  ONCE IVP  Last administered on 6/19/20at 14:52;  

Start 6/19/20 at 10:45;  Stop 6/19/20 at 10:49;  Status DC


Potassium Acetate 60 meq/Magnesium Sulfate 14 meq/ Multivitamins 10 ml/Chromium/

Copper/Manganese/ Seleni/Zn 1 ml/ Insulin Human Regular 15 unit/ Total 

Parenteral Nutrition/Amino Acids/Dextrose/ Fat Emulsion Intravenous 1,920 ml @  

80 mls/hr TPN  CONT IV  Last administered on 6/19/20at 22:08;  Start 6/19/20 at 

22:00;  Stop 6/20/20 at 21:59;  Status DC


Potassium Acetate 60 meq/Magnesium Sulfate 14 meq/ Multivitamins 10 ml/Chromium/

Copper/Manganese/ Seleni/Zn 1 ml/ Insulin Human Regular 15 unit/ Total 

Parenteral Nutrition/Amino Acids/Dextrose/ Fat Emulsion Intravenous 1,920 ml @  

80 mls/hr TPN  CONT IV  Last administered on 6/20/20at 22:12;  Start 6/20/20 at 

22:00;  Stop 6/21/20 at 21:59;  Status DC


Potassium Acetate 60 meq/Magnesium Sulfate 14 meq/ Multivitamins 10 ml/Chromium/

Copper/Manganese/ Seleni/Zn 1 ml/ Insulin Human Regular 15 unit/ Total 

Parenteral Nutrition/Amino Acids/Dextrose/ Fat Emulsion Intravenous 1,920 ml @  

80 mls/hr TPN  CONT IV  Last administered on 6/21/20at 22:22;  Start 6/21/20 at 

22:00;  Stop 6/22/20 at 21:59;  Status DC


Furosemide (Lasix) 20 mg 1X  ONCE IVP  Last administered on 6/22/20at 11:07;  

Start 6/22/20 at 10:30;  Stop 6/22/20 at 10:34;  Status DC


Potassium Acetate 60 meq/Magnesium Sulfate 14 meq/ Multivitamins 10 ml/Chromium/

Copper/Manganese/ Seleni/Zn 1 ml/ Insulin Human Regular 15 unit/ Sodium Chloride

20 meq/Total Parenteral Nutrition/Amino Acids/Dextrose/ Fat Emulsion Intravenous

1,920 ml @  80 mls/hr TPN  CONT IV  Last administered on 6/22/20at 21:54;  Start

6/22/20 at 22:00;  Stop 6/23/20 at 21:59;  Status DC


Potassium Acetate 30 meq/Magnesium Sulfate 14 meq/ Multivitamins 10 ml/Chromium/

Copper/Manganese/ Seleni/Zn 1 ml/ Insulin Human Regular 15 unit/ Sodium Chloride

20 meq/Potassium Chloride 30 meq/ Total Parenteral Nutrition/Amino 

Acids/Dextrose/ Fat Emulsion Intravenous 1,920 ml @  80 mls/hr TPN  CONT IV  

Last administered on 6/23/20at 21:46;  Start 6/23/20 at 22:00;  Stop 6/24/20 at 

21:59;  Status DC


Sodium Chloride 80 meq/Potassium Chloride 30 meq/ Potassium Acetate 30 

meq/Magnesium Sulfate 14 meq/ Multivitamins 10 ml/Chromium/ Copper/Manganese/ 

Seleni/Zn 1 ml/ Insulin Human Regular 15 unit/ Total Parenteral Nutrition/Amino 

Acids/Dextrose/ Fat Emulsion Intravenous 1,920 ml @  80 mls/hr TPN  CONT IV  

Last administered on 6/24/20at 22:33;  Start 6/24/20 at 22:00;  Stop 6/25/20 at 

21:59


Furosemide (Lasix) 40 mg 1X  ONCE IVP  Last administered on 6/24/20at 16:27;  

Start 6/24/20 at 15:30;  Stop 6/24/20 at 15:33;  Status DC


Albumin Human 250 ml @  62.5 mls/hr 1X  ONCE IV  Last administered on 6/24/20at 

16:27;  Start 6/24/20 at 15:30;  Stop 6/24/20 at 19:29;  Status DC





Active Scripts


Active


Reported


Bisoprolol Fumarate 5 Mg Tablet 10 Mg PO DAILY


Vitals/I & O





Vital Sign - Last 24 Hours








 6/24/20 6/24/20 6/24/20 6/24/20





 10:00 12:00 12:13 15:00


 


Temp  99.0  





  99.0  


 


Pulse 120 123  126


 


Resp 21 22  27


 


B/P (MAP) 123/72 (89) 120/75 (90)  125/80 (95)


 


Pulse Ox 98 98 97 98


 


O2 Delivery Tracheal Collar Tracheal Collar Tracheal Collar Tracheal Collar


 


O2 Flow Rate   8.0 


 


    





    





 6/24/20 6/24/20 6/24/20 6/24/20





 16:06 18:00 19:00 19:59


 


Pulse  120 122 


 


Resp  22 22 


 


B/P (MAP)  118/69 (85) 110/69 (83) 


 


Pulse Ox 98 99 99 98


 


O2 Delivery Tracheal Collar Tracheal Collar Tracheal Collar Tracheal Collar


 


O2 Flow Rate 8.0   8.0





 6/24/20 6/24/20 6/24/20 6/24/20





 20:00 20:00 21:02 21:32


 


Temp  98.2  





  98.2  


 


Pulse  121  


 


Resp  22 24 24


 


B/P (MAP)  115/66 (82)  


 


Pulse Ox  98 98 98


 


O2 Delivery Trach Collar Tracheal Collar  


 


O2 Flow Rate   8.0 8.0


 


    





    





 6/25/20 6/25/20 6/25/20 6/25/20





 00:00 00:20 04:00 04:34


 


Temp 98.8  97.8 





 98.8  97.8 


 


Pulse 107  107 


 


Resp 15  18 


 


B/P (MAP) 100/58 (72)  100/58 (72) 


 


Pulse Ox 98 96 98 97


 


O2 Delivery Tracheal Collar Tracheal Collar Tracheal Collar Tracheal Collar


 


O2 Flow Rate  8.0 8.0 8.0


 


    





    





 6/25/20 6/25/20 6/25/20 





 08:00 08:00 08:19 


 


Temp  98.8  





  98.8  


 


Pulse  121  


 


Resp  18  


 


B/P (MAP)  105/59 (74)  


 


Pulse Ox  96 97 


 


O2 Delivery Trach Collar Tracheal Collar Tracheal Collar 


 


O2 Flow Rate 6.0 6.0 8.0 














Intake and Output   


 


 6/24/20 6/24/20 6/25/20





 15:00 23:00 07:00


 


Intake Total 200 ml 1612 ml 912.9 ml


 


Output Total 700 ml 1635 ml 795 ml


 


Balance -500 ml -23 ml 117.9 ml











Hemodynamically unstable?:  No


Is patient in severe pain?:  No


Is NPO status required?:  No











OVIDIO SARAVIA MD          Jun 25, 2020 09:30

## 2020-06-25 NOTE — PDOC
SURGICAL PROGRESS NOTE


Subjective


coughing


awake in bed


Vital Signs





Vital Signs








  Date Time  Temp Pulse Resp B/P (MAP) Pulse Ox O2 Delivery O2 Flow Rate FiO2


 


6/25/20 08:19     97 Tracheal Collar 8.0 


 


6/25/20 08:00 98.8 121 18 105/59 (74)    





 98.8       








I&O











Intake and Output 


 


 6/25/20





 07:00


 


Intake Total 2724.9 ml


 


Output Total 3130 ml


 


Balance -405.1 ml


 


 


 


IV Total 2724.9 ml


 


Output Urine Total 2950 ml


 


Chest Tube Drainage Total 120 ml


 


Drainage Total 60 ml








PATIENT HAS A VILLASENOR:  Yes


General:  Cooperative, No acute distress


HEENT:  Other (trach)


Abdomen:  Soft, Other (drains in place)


Labs





Laboratory Tests








Test


 6/23/20


12:22 6/23/20


17:01 6/24/20


00:36 6/24/20


06:15


 


Glucose (Fingerstick)


 156 mg/dL


(70-99) 157 mg/dL


(70-99) 141 mg/dL


(70-99) 





 


White Blood Count


 


 


 


 13.2 x10^3/uL


(4.0-11.0)


 


Red Blood Count


 


 


 


 3.41 x10^6/uL


(3.50-5.40)


 


Hemoglobin


 


 


 


 9.4 g/dL


(12.0-15.5)


 


Hematocrit


 


 


 


 29.0 %


(36.0-47.0)


 


Mean Corpuscular Volume    85 fL () 


 


Mean Corpuscular Hemoglobin    28 pg (25-35) 


 


Mean Corpuscular Hemoglobin


Concent 


 


 


 33 g/dL


(31-37)


 


Red Cell Distribution Width


 


 


 


 17.5 %


(11.5-14.5)


 


Platelet Count


 


 


 


 449 x10^3/uL


(140-400)


 


Neutrophils (%) (Auto)    81 % (31-73) 


 


Lymphocytes (%) (Auto)    11 % (24-48) 


 


Monocytes (%) (Auto)    6 % (0-9) 


 


Eosinophils (%) (Auto)    1 % (0-3) 


 


Basophils (%) (Auto)    0 % (0-3) 


 


Neutrophils # (Auto)


 


 


 


 10.8 x10^3/uL


(1.8-7.7)


 


Lymphocytes # (Auto)


 


 


 


 1.5 x10^3/uL


(1.0-4.8)


 


Monocytes # (Auto)


 


 


 


 0.8 x10^3/uL


(0.0-1.1)


 


Eosinophils # (Auto)


 


 


 


 0.1 x10^3/uL


(0.0-0.7)


 


Basophils # (Auto)


 


 


 


 0.0 x10^3/uL


(0.0-0.2)


 


Prothrombin Time


 


 


 


 14.0 SEC


(11.7-14.0)


 


Prothromb Time International


Ratio 


 


 


 1.1 (0.8-1.1) 





 


Sodium Level


 


 


 


 135 mmol/L


(136-145)


 


Potassium Level


 


 


 


 4.3 mmol/L


(3.5-5.1)


 


Chloride Level


 


 


 


 99 mmol/L


()


 


Carbon Dioxide Level


 


 


 


 34 mmol/L


(21-32)


 


Anion Gap    2 (6-14) 


 


Blood Urea Nitrogen


 


 


 


 13 mg/dL


(7-20)


 


Creatinine


 


 


 


 0.7 mg/dL


(0.6-1.0)


 


Estimated GFR


(Cockcroft-Gault) 


 


 


 88.9 





 


BUN/Creatinine Ratio    19 (6-20) 


 


Glucose Level


 


 


 


 130 mg/dL


(70-99)


 


Calcium Level


 


 


 


 10.4 mg/dL


(8.5-10.1)


 


Phosphorus Level


 


 


 


 4.2 mg/dL


(2.6-4.7)


 


Magnesium Level


 


 


 


 2.1 mg/dL


(1.8-2.4)


 


Total Bilirubin


 


 


 


 0.4 mg/dL


(0.2-1.0)


 


Aspartate Amino Transf


(AST/SGOT) 


 


 


 23 U/L (15-37) 





 


Alanine Aminotransferase


(ALT/SGPT) 


 


 


 20 U/L (14-59) 





 


Alkaline Phosphatase


 


 


 


 255 U/L


()


 


Total Protein


 


 


 


 4.8 g/dL


(6.4-8.2)


 


Albumin


 


 


 


 1.1 g/dL


(3.4-5.0)


 


Albumin/Globulin Ratio    0.3 (1.0-1.7) 


 


Test


 6/24/20


06:20 6/24/20


18:04 6/25/20


01:01 6/25/20


06:16


 


Glucose (Fingerstick)


 133 mg/dL


(70-99) 172 mg/dL


(70-99) 134 mg/dL


(70-99) 123 mg/dL


(70-99)








Laboratory Tests








Test


 6/24/20


18:04 6/25/20


01:01 6/25/20


06:16


 


Glucose (Fingerstick)


 172 mg/dL


(70-99) 134 mg/dL


(70-99) 123 mg/dL


(70-99)








Problem List


Problems


Medical Problems:


(1) Acute pancreatitis


Status: Acute  





(2) Cholelithiasis


Status: Acute  








Assessment/Plan


supportive care, surgery next week





Justicifation of Admission Dx:


Justifications for Admission:


Justification of Admission Dx:  Yes











SAURAV NASH APRN            Jun 25, 2020 10:29

## 2020-06-25 NOTE — NUR
Pharmacy TPN Dosing Note



S: JESENIA BEAN is a 49 year old F Currently receiving Central Continuous TPN started 
03/18/20



B:Pertinent PMH: Necrotizing pancreatitis

Height: 5 feet, 8 inches

Weight: 87.5 kg



Current diet: NPO 



LABS:

Sodium:    135 

Potassium: 4.3 

Chloride:  99 

Calcium:   10.4 

Corrected Calcium: 12.72 

Magnesium: 2.1 

CO2:       34 

SCr:       0.7 

Glucose:   130-157 

Albumin:   1.1 

AST:       23 

ALT:       20 



TPN FORMULA:

TPN TYPE:  Central Continuous

AMINO ACIDS:         80 gm

DEXTROSE:            250 gm

LIPIDS:              20 gm



SODIUM CHLORIDE:     80 mEq

POTASSIUM CHLORIDE:  30 mEq

POTASSIUM ACETATE:   30 mEq

MAGNESIUM:           14 mEq

INSULIN:             15 units

MULTIPLE VITAMIN:    10 ml

TRACE ELEMENTS:      1 ml(s)



TPN PLAN:  Continue same; labs in am





R: Continue TPN 

Will monitor electrolytes, glucose, and tolerance to TPN.



 Maribell Vazquez RPH, 06/25/20 [id number]

## 2020-06-25 NOTE — RAD
Examination: Ultrasound abdomen complete

 

HISTORY: History of pancreatitis

 

COMPARISON: 5/4/2020

 

FINDINGS:

 

The aorta, IVC and pancreas are not well-visualized due to bowel gas. 

Fluid collection identified anterior to the pancreas. Increased 

echogenicity identified in the liver likely hepatic steatosis. Gallstones 

identified within the gallbladder. The gallbladder wall thickness measures

4 mm. The common bile duct measures 4 mm in diameter.

 

Right kidney measures 12.9 cm in length. Mild right-sided hydronephrosis.

 

Mild ascites. Probable small right pleural effusion.

 

 

 

IMPRESSION:

 

1. Cholelithiasis. Mild thickened appearance of the gallbladder wall.

 

2. Mild right hydronephrosis.

 

3. Fluid collection identified anterior to the pancreas. 

 

4. Hepatic steatosis.

 

Electronically signed by: Logan Duran MD (6/25/2020 6:53 AM) [id number]

## 2020-06-25 NOTE — NUR
SS following up with discharge planning. SS reviewed pt chart and discussed with pt RN. IV 
antibiotics stopped. Pt has tentative surgery scheduled for next Tuesday. Pt remains on TPN. 
Pt has drains x3 and chest tube. Pt on trach collar. SS will continue to follow for 
discharge planning.

## 2020-06-25 NOTE — PDOC
D: Pt admitted on 12/10/19 for fever, malaise, vomiting, hypotension, IMANI and new leukocytosis. Hx of CAD s/p multiple PCI's, VT storm s/p ablation and CRT-D, STEPHANIE, Factor V Leiden and ICM s/p HM II (01/2016) with pump exchange (5/13/19). Pump exchange c/b sternal wound drainage and substernal subcutaneous abscess s/p I&D 11/20/19. Now s/p I & D of sternal wound 12/23/19.     I/A: Pt alert and oriented x4. Pt paced rhythm with HRs in the 70s. LVAD numbers WNL, free of alarms this shift. Please refer to flowsheets for details. Dressing CDI. On 2L O2 via NC with sats >92%. Denies pain, lightheadedness or dizziness. Pt appetite fair, denies nausea. BS checks done ACHS, sliding scale insulin not given. Bedtime levemir given. Per pt report, last BM yesterday. Bumex gtt continued at 1.5 mg/hr, pt voiding. Condom catheter continues to be on, site WDL. Wound vac therapy with Vashe solution continued at -125 mmHg, dressing change done today by WOC RN.     P: Continue to monitor and assess pt with every encounter. Notify Cards 2 with any changes or concerns.    Infectious Disease Note


Subjective


Subjective


Patient is awake says she is feeling better





ROS


ROS


no n/v/d/pain





Vital Sign


Vital Signs





Vital Signs








  Date Time  Temp Pulse Resp B/P (MAP) Pulse Ox O2 Delivery O2 Flow Rate FiO2


 


6/25/20 08:19     97 Tracheal Collar 8.0 


 


6/25/20 08:00 98.8 121 18 105/59 (74)    





 98.8       











Physical Exam


PHYSICAL EXAM


GENERAL: Propped up in bed, resting quietly 


HEENT: NGT in place. 


NECK:  Tracheostomy 


LUNGS: Diminished aeration bases, no accessory muscle use - CT on left with 

clear fluid


HEART:  S1, S2,regular 


ABDOMEN: Mild distention, bowel sounds present, soft, grimaces to palpation, 

drains x 3


: [place] ( 6/7)


EXTREMITIES: + edema BLE


SKIN: no signs of gen rash 


LUE-PICC (5/29) without signs of complications





Labs


Lab





Laboratory Tests








Test


 6/24/20


18:04 6/25/20


01:01 6/25/20


06:16


 


Glucose (Fingerstick)


 172 mg/dL


(70-99) 134 mg/dL


(70-99) 123 mg/dL


(70-99)








Micro








Objective


Assessment


Fever intermittent could be from underlying pancreatitis vs aspiration 


? Ileus with vomiting


Abd distention - U/S and CT reviewed s/p 0.4 L of opaque, debris-containing 

ascites was removed 5/6


Acute pancreatitis with persistent necrosis


  - 4/27 status post ROBERT drain placement + C paropsilosis; 5/6 + yeast & high 

amylase; s/p additional drains on 5/8


Anemia - S/p PRBCs


Cholelithiasis with thickening of the gallbladder wall.


Leucocytosis improved 


JED, hyperkalemia, Metabolic acidosis off dialysis


Acute hypoxic resp failure ,bilateral pleural effusion and atelectasis


hypocalcemia 


Prediabetes


HTN


s/p trach





Plan


Plan of Care


d/c antibiotics , observe off 


Monitor labs/temp


General surgery following


Monitor drain output


Maintain aspiration precaution


Supportive care


Contact islation for CRE/MARIBETHO











LINN FRANZ MD               Jun 25, 2020 08:59

## 2020-06-25 NOTE — PDOC
PULMONARY PROGRESS NOTES


Subjective


Patient sitting up in a chair


off vent ,ct drainage 200 cc/24 hrs 


Patient intubated on 3/23 , s/p trach 4/6, 


transferred back to ICU 6/5 for syncope with collapse


developed anemia, blood drainage from RLQ abdomen drain site, and surrounding 

firmness  / developed septic shock 6/7 from abdomen source, required levo 6/7


s/p 3 new drains 6/7 with brown color drainage


s/p left chest tube, draining


Vitals





Vital Signs








  Date Time  Temp Pulse Resp B/P (MAP) Pulse Ox O2 Delivery O2 Flow Rate FiO2


 


6/25/20 08:19     97 Tracheal Collar 8.0 


 


6/25/20 08:00 98.8 121 18 105/59 (74)    





 98.8       








Comments


awake,no soa


General:  Alert, No acute distress


HEENT:  Other (nc at perrl   nose clear    neck  trach site ok   no lab  no 

thyromegaly)


Lungs:  Other (diminshed in bases, Rhonci in LLL)


Cardiovascular:  S1, S2


Abdomen:  Soft, Non-tender, Other (bleeding from Sx. site RLQ and firmness)


Extremities:  Other (+1 BLE edema)


Skin:  Warm, Dry


Labs





Laboratory Tests








Test


 6/23/20


12:22 6/23/20


17:01 6/24/20


00:36 6/24/20


06:15


 


Glucose (Fingerstick)


 156 mg/dL


(70-99) 157 mg/dL


(70-99) 141 mg/dL


(70-99) 





 


White Blood Count


 


 


 


 13.2 x10^3/uL


(4.0-11.0)


 


Red Blood Count


 


 


 


 3.41 x10^6/uL


(3.50-5.40)


 


Hemoglobin


 


 


 


 9.4 g/dL


(12.0-15.5)


 


Hematocrit


 


 


 


 29.0 %


(36.0-47.0)


 


Mean Corpuscular Volume    85 fL () 


 


Mean Corpuscular Hemoglobin    28 pg (25-35) 


 


Mean Corpuscular Hemoglobin


Concent 


 


 


 33 g/dL


(31-37)


 


Red Cell Distribution Width


 


 


 


 17.5 %


(11.5-14.5)


 


Platelet Count


 


 


 


 449 x10^3/uL


(140-400)


 


Neutrophils (%) (Auto)    81 % (31-73) 


 


Lymphocytes (%) (Auto)    11 % (24-48) 


 


Monocytes (%) (Auto)    6 % (0-9) 


 


Eosinophils (%) (Auto)    1 % (0-3) 


 


Basophils (%) (Auto)    0 % (0-3) 


 


Neutrophils # (Auto)


 


 


 


 10.8 x10^3/uL


(1.8-7.7)


 


Lymphocytes # (Auto)


 


 


 


 1.5 x10^3/uL


(1.0-4.8)


 


Monocytes # (Auto)


 


 


 


 0.8 x10^3/uL


(0.0-1.1)


 


Eosinophils # (Auto)


 


 


 


 0.1 x10^3/uL


(0.0-0.7)


 


Basophils # (Auto)


 


 


 


 0.0 x10^3/uL


(0.0-0.2)


 


Prothrombin Time


 


 


 


 14.0 SEC


(11.7-14.0)


 


Prothromb Time International


Ratio 


 


 


 1.1 (0.8-1.1) 





 


Sodium Level


 


 


 


 135 mmol/L


(136-145)


 


Potassium Level


 


 


 


 4.3 mmol/L


(3.5-5.1)


 


Chloride Level


 


 


 


 99 mmol/L


()


 


Carbon Dioxide Level


 


 


 


 34 mmol/L


(21-32)


 


Anion Gap    2 (6-14) 


 


Blood Urea Nitrogen


 


 


 


 13 mg/dL


(7-20)


 


Creatinine


 


 


 


 0.7 mg/dL


(0.6-1.0)


 


Estimated GFR


(Cockcroft-Gault) 


 


 


 88.9 





 


BUN/Creatinine Ratio    19 (6-20) 


 


Glucose Level


 


 


 


 130 mg/dL


(70-99)


 


Calcium Level


 


 


 


 10.4 mg/dL


(8.5-10.1)


 


Phosphorus Level


 


 


 


 4.2 mg/dL


(2.6-4.7)


 


Magnesium Level


 


 


 


 2.1 mg/dL


(1.8-2.4)


 


Total Bilirubin


 


 


 


 0.4 mg/dL


(0.2-1.0)


 


Aspartate Amino Transf


(AST/SGOT) 


 


 


 23 U/L (15-37) 





 


Alanine Aminotransferase


(ALT/SGPT) 


 


 


 20 U/L (14-59) 





 


Alkaline Phosphatase


 


 


 


 255 U/L


()


 


Total Protein


 


 


 


 4.8 g/dL


(6.4-8.2)


 


Albumin


 


 


 


 1.1 g/dL


(3.4-5.0)


 


Albumin/Globulin Ratio    0.3 (1.0-1.7) 


 


Test


 6/24/20


06:20 6/24/20


18:04 6/25/20


01:01 6/25/20


06:16


 


Glucose (Fingerstick)


 133 mg/dL


(70-99) 172 mg/dL


(70-99) 134 mg/dL


(70-99) 123 mg/dL


(70-99)








Laboratory Tests








Test


 6/24/20


18:04 6/25/20


01:01 6/25/20


06:16


 


Glucose (Fingerstick)


 172 mg/dL


(70-99) 134 mg/dL


(70-99) 123 mg/dL


(70-99)








Medications





Active Scripts








 Medications  Dose


 Route/Sig


 Max Daily Dose Days Date Category


 


 Bisoprolol


 Fumarate 5 Mg


 Tablet  10 Mg


 PO DAILY


   3/16/20 Reported








Comments


CXR 6/22


poor insp effort/ increase effusion


 











ct abdomen /pelvis 6/6


1. Removal of the percutaneous pigtail drainage catheters since the prior 


exam. Sequela of pancreatitis with extensive pseudocysts again 


demonstrated, the right-sided collections are slightly larger since the 


prior exam, the left-sided collections are stable. See above.


2. Moderate to large left pleural effusion with atelectasis and collapse 


of most of the left lower lobe, stable. Small right pleural effusion is 


stable.


3. Gallstone.





ct chest 6/15 reviewed








 GRAM NEG COCCOBACILLI:MANY


        SQUAMOUS EPI CELL:RARE


        PMN (WBCs):FEW


        Unless otherwise specified, Testing Performed by:


        [address] [address]


        For Inquires, the Physician may contact the Microbiology


        department at [phone]





  RESPIRATORY CULTURE  Final  


        Final





        MANY GRAM NEGATIVE RODS on 06/15/20 at 1107


        FINAL ID= [PSEUDOMONAS AERUGINOSA]


        MICRO CHARGES


        PSEUDOMONAS AERUGINOSA





  ANTIMICROBIAL SUSCEPTIBILITY  Final  


        Comment





        NEG ANSON 56


        PSEUDOMONAS AERUGINOSA


        ANTIBIOTIC                        RESULT          INTERPRETATION


        AMIKACIN                          <=16                  S


        AZTREONAM                         <=4                   S


        CEFTAZIDIME                       <=1                   S


        CIPROFLOXACIN                     <=0.25                S


        CEFEPIME                          <=2                   S


        CEFTAZIDIME/AVIBACTAM             <=4                   S


        GENTAMICIN                        <=2                   S


        LEVOFLOXACIN                      <=0.5                 S





Impression


.


IMPRESSION:


1.  Acute hypoxemic respiratory failure secondary to ARDS status post trach, 

developed anemia 6/7, blood drainage from RLQ abdomen drain site, and 

surrounding firmness  / developed septic shock 6/7 from abdomen source, required

levo 6/7


s/p 3 new drains 6/7 with brown color drainage,  on/off vent , on TS now


2.  Gallstone pancreatitis, now with ongoing bleeding from prior drain. Anemic. 

s/p Tx multiple units over several days


3.  septic shock/sepsis, recurrent 6/7, source abdomen. , off levo now, 


4.  Acute kidney injury-, Off HD--renal function decling. suspect JED on CKD due

to hypotension , improved now


5.  Acute gallstone pancreatitis.


6.  Hypoalbuminemia.


7.  Moderate persistent effusions, s/p left thora  5/12, reaccumulation of left 

effusion. O2 requirement not changed. 


8.  Fever- ,hypotension. suspect recurrent sepsis/ likely pancreatic source.  

Per ID, per surgery--


9.  Chronic anemia-- ongoing / s/p PRBC


10. Covid 19 testing negative


11. Moderate to large ascites-S/P paracentisis


12.S/P paracentisis with 4 liters removed on 4/15/20


13. S/P IR drain placement on 5/8/2020, removal, re inserted 6/7


14. Depression/Anxiety 


15. Increase effusion, ? loculated/ s/p chest tube.. drainage slowing down. 200 

cc /24 hr





Plan


.


Tentatively scheduled for surgery next week


1. TS as tolerated  titrate fio2 to keep sat 94%, off  vent 


2. Follow all cultures/ PSA from pelvic drains. Abx per ID/  thoracentesis 

result transudate, await c/s,


3. Follow surgery recs-- Acute pancreatitis with persistent necrosis ---- 4/27 

status post ROBERT drain placement + C paropsilosis; 5/6 + yeast & high amylase; s/p

additional drains on 5/8, removal of drains and now increase pseudocyst and 

increase fluid collection. likely infected fluid/ sepsis. on BS abx.follow 

surgery rec, planning surgical intervention next few weeks


4. Follow ID recs for ABX----


5. Follow nephrology recs -----


6. Continue TPN 


7. monitor HGB,  4 units since 6/6--monitor HGB transfuse if less than 8.5 


8 s/p  thoracentesis, 5/12, 3 litres removed, s/p ct on 6/15. ct drainage, 180 

cc over the past 24 hrs,  suction -40, am cxr, flushed chest tube 6/22


9. Pt remains critically ill from severe necrotic pancreatis. Even with surgery 

prognosis grim. Surgery informed family.


10. lasix/albumin prn


11.  sputum gram neg cocobacilli. pan sensitive





     


DVT/GI PPX: protonix---


PT/OT


D/W RN, RT











STEVE MIRANDA MD              Jun 25, 2020 10:26

## 2020-06-25 NOTE — PDOC
Objective:


Objective:


D/w nurse - stable, some drainage from CT.


Vital Signs:





                                   Vital Signs








  Date Time  Temp Pulse Resp B/P (MAP) Pulse Ox O2 Delivery O2 Flow Rate FiO2


 


6/25/20 08:19     97 Tracheal Collar 8.0 


 


6/25/20 08:00 98.8 121 18 105/59 (74)    





 98.8       








Labs:





Laboratory Tests








Test


 6/24/20


18:04 6/25/20


01:01 6/25/20


06:16


 


Glucose (Fingerstick) 172 mg/dL  134 mg/dL  123 mg/dL 








Imaging:


Abd US 6/25


IMPRESSION:


1. Cholelithiasis. Mild thickened appearance of the gallbladder wall.


2. Mild right hydronephrosis.


3. Fluid collection identified anterior to the pancreas. 


4. Hepatic steatosis.





PE:





GEN: NAD - sitting on edge of bed w/ therapy


NEURO/PSYCH: A & O 3





A/P:


Gallstone pancreatitis





--


Tentative plans for surgery next week.





Justicifation of Admission Dx:


Justifications for Admission:


Justification of Admission Dx:  Yes











RAGHAVENDRA UMRCIA         Jun 25, 2020 11:39

## 2020-06-25 NOTE — RAD
CHEST AP ONLY

 

History: Reason: hypoxia, effusion,104 / Spl. Instructions:  / History: 

 

Comparison: June 22, 2020

 

Findings:

Moderate right pleural effusion with increased adjacent opacity. Linear 

appearance related to the minor fissure. Small left pleural effusion. 

Patchy bilateral mid and basilar opacities. Left apical pleural drain, 

unchanged. Stable left PICC. Tracheotomy tube and enteric tube are 

unchanged. Unchanged heart size.

 

Impression: 

1.  Moderate right pleural effusion with increased adjacent opacities.

2.  Small left pleural effusion with adjacent opacity, unchanged. Stable 

left pigtail pleural drain.

 

Electronically signed by: Torrey Coyle DO (6/25/2020 1:34 PM) [id number]

## 2020-06-26 VITALS — DIASTOLIC BLOOD PRESSURE: 64 MMHG | SYSTOLIC BLOOD PRESSURE: 114 MMHG

## 2020-06-26 VITALS — DIASTOLIC BLOOD PRESSURE: 73 MMHG | SYSTOLIC BLOOD PRESSURE: 121 MMHG

## 2020-06-26 VITALS — DIASTOLIC BLOOD PRESSURE: 70 MMHG | SYSTOLIC BLOOD PRESSURE: 106 MMHG

## 2020-06-26 VITALS — SYSTOLIC BLOOD PRESSURE: 123 MMHG | DIASTOLIC BLOOD PRESSURE: 67 MMHG

## 2020-06-26 VITALS — SYSTOLIC BLOOD PRESSURE: 115 MMHG | DIASTOLIC BLOOD PRESSURE: 71 MMHG

## 2020-06-26 VITALS — DIASTOLIC BLOOD PRESSURE: 57 MMHG | SYSTOLIC BLOOD PRESSURE: 91 MMHG

## 2020-06-26 VITALS — DIASTOLIC BLOOD PRESSURE: 91 MMHG | SYSTOLIC BLOOD PRESSURE: 113 MMHG

## 2020-06-26 LAB
ALBUMIN SERPL-MCNC: 1.1 G/DL (ref 3.4–5)
ALBUMIN/GLOB SERPL: 0.3 {RATIO} (ref 1–1.7)
ALP SERPL-CCNC: 161 U/L (ref 46–116)
ALT SERPL-CCNC: 14 U/L (ref 14–59)
ANION GAP SERPL CALC-SCNC: 0 MMOL/L (ref 6–14)
AST SERPL-CCNC: 19 U/L (ref 15–37)
BASOPHILS # BLD AUTO: 0 X10^3/UL (ref 0–0.2)
BASOPHILS NFR BLD: 0 % (ref 0–3)
BILIRUB SERPL-MCNC: 0.5 MG/DL (ref 0.2–1)
BUN SERPL-MCNC: 15 MG/DL (ref 7–20)
BUN/CREAT SERPL: 25 (ref 6–20)
CALCIUM SERPL-MCNC: 10.4 MG/DL (ref 8.5–10.1)
CHLORIDE SERPL-SCNC: 101 MMOL/L (ref 98–107)
CO2 SERPL-SCNC: 36 MMOL/L (ref 21–32)
CREAT SERPL-MCNC: 0.6 MG/DL (ref 0.6–1)
EOSINOPHIL NFR BLD: 0.2 X10^3/UL (ref 0–0.7)
EOSINOPHIL NFR BLD: 2 % (ref 0–3)
ERYTHROCYTE [DISTWIDTH] IN BLOOD BY AUTOMATED COUNT: 18.1 % (ref 11.5–14.5)
GFR SERPLBLD BASED ON 1.73 SQ M-ARVRAT: 106.3 ML/MIN
GLOBULIN SER-MCNC: 3.5 G/DL (ref 2.2–3.8)
GLUCOSE SERPL-MCNC: 126 MG/DL (ref 70–99)
HCT VFR BLD CALC: 25.5 % (ref 36–47)
HGB BLD-MCNC: 8.6 G/DL (ref 12–15.5)
LYMPHOCYTES # BLD: 1.3 X10^3/UL (ref 1–4.8)
LYMPHOCYTES NFR BLD AUTO: 12 % (ref 24–48)
MAGNESIUM SERPL-MCNC: 2.1 MG/DL (ref 1.8–2.4)
MCH RBC QN AUTO: 29 PG (ref 25–35)
MCHC RBC AUTO-ENTMCNC: 34 G/DL (ref 31–37)
MCV RBC AUTO: 85 FL (ref 79–100)
MONO #: 0.8 X10^3/UL (ref 0–1.1)
MONOCYTES NFR BLD: 7 % (ref 0–9)
NEUT #: 8.5 X10^3/UL (ref 1.8–7.7)
NEUTROPHILS NFR BLD AUTO: 78 % (ref 31–73)
PHOSPHATE SERPL-MCNC: 4.4 MG/DL (ref 2.6–4.7)
PLATELET # BLD AUTO: 380 X10^3/UL (ref 140–400)
POTASSIUM SERPL-SCNC: 4.1 MMOL/L (ref 3.5–5.1)
PROT SERPL-MCNC: 4.6 G/DL (ref 6.4–8.2)
RBC # BLD AUTO: 3.01 X10^6/UL (ref 3.5–5.4)
SODIUM SERPL-SCNC: 137 MMOL/L (ref 136–145)
WBC # BLD AUTO: 10.9 X10^3/UL (ref 4–11)

## 2020-06-26 RX ADMIN — IPRATROPIUM BROMIDE AND ALBUTEROL SULFATE SCH ML: .5; 3 SOLUTION RESPIRATORY (INHALATION) at 19:54

## 2020-06-26 RX ADMIN — IPRATROPIUM BROMIDE AND ALBUTEROL SULFATE SCH ML: .5; 3 SOLUTION RESPIRATORY (INHALATION) at 08:56

## 2020-06-26 RX ADMIN — IPRATROPIUM BROMIDE AND ALBUTEROL SULFATE SCH ML: .5; 3 SOLUTION RESPIRATORY (INHALATION) at 03:54

## 2020-06-26 RX ADMIN — INSULIN LISPRO SCH UNITS: 100 INJECTION, SOLUTION INTRAVENOUS; SUBCUTANEOUS at 05:14

## 2020-06-26 RX ADMIN — Medication PRN EACH: at 11:51

## 2020-06-26 RX ADMIN — INSULIN LISPRO SCH UNITS: 100 INJECTION, SOLUTION INTRAVENOUS; SUBCUTANEOUS at 12:00

## 2020-06-26 RX ADMIN — IPRATROPIUM BROMIDE AND ALBUTEROL SULFATE SCH ML: .5; 3 SOLUTION RESPIRATORY (INHALATION) at 12:41

## 2020-06-26 RX ADMIN — INSULIN LISPRO SCH UNITS: 100 INJECTION, SOLUTION INTRAVENOUS; SUBCUTANEOUS at 18:00

## 2020-06-26 RX ADMIN — IPRATROPIUM BROMIDE AND ALBUTEROL SULFATE SCH ML: .5; 3 SOLUTION RESPIRATORY (INHALATION) at 00:00

## 2020-06-26 RX ADMIN — IPRATROPIUM BROMIDE AND ALBUTEROL SULFATE SCH ML: .5; 3 SOLUTION RESPIRATORY (INHALATION) at 16:48

## 2020-06-26 RX ADMIN — IPRATROPIUM BROMIDE AND ALBUTEROL SULFATE SCH ML: .5; 3 SOLUTION RESPIRATORY (INHALATION) at 23:47

## 2020-06-26 RX ADMIN — INSULIN LISPRO SCH UNITS: 100 INJECTION, SOLUTION INTRAVENOUS; SUBCUTANEOUS at 00:00

## 2020-06-26 RX ADMIN — PANTOPRAZOLE SODIUM SCH MG: 40 INJECTION, POWDER, FOR SOLUTION INTRAVENOUS at 07:30

## 2020-06-26 NOTE — PDOC
PROGRESS NOTES


Chief Complaint


Chief Complaint





History of Present Illness


48yo F w/ PMHx HTN, prediabetes who presented the emergency room complaints of 

abdominal pain. Patient described off and on 3 days. She states is constant, 

described as a squeezing sensation in a band-like distribution. + nausea, 

vomiting.  She denies any fever or diarrhea.  Patient denies any abdominal 

surgical procedures.  She stateed is worse with movements, car ride.  Pain 

initially was upper abdomen however now pretty much generalized.  Last bowel 

movement was 3/15/2020. Nothing makes her pain better.  Patient denies any 

shortness of breath.  She does state the pain moves into her chest.  Denies any 

headache or visual changes.


Lipase 98147, , , Bilirubin 1.4.


CT abdomen confirms pancreatic inflammation, peripancreatic fluid and 

inflammatory changes around the pancreas consistent with pancreatitis. 

Cholelithiasis and 1.4cm uterine fibroid as well as possible left salpingitis. 

Admitted for further care


Gallstone pancreatitis with necrosis. 


   -CT A/P 6/6 showed multiple pseudocysts, slight larger on the right. s/p 

drains x 3, 6/7.  + PSAE (MDRO-R Cefepime, Zosyn ANSON < 64) and yeast, 


   -s/p drain 4/27. C. parapsilosis. s/p drain 5/6 + yeast & high amylase; s/p 

additional drain on 5/8. Drains removed. 


Ascites s/p paracentesis 4/15 & 5/6. C. parapsilosis 


JED. off HD. 





Impression and plan:


Acute hypoxic Respiratory failure required  mechanical ventilation


Tracheostomy


bilateral pleural effusions/pulm edema s/p Throacentesis on 6/15/2020


Severe Acute gallstone pancreatitis (not a surgical candidate at this time) with

necrosis


Acute kidney failure now requiring dialysis


Gallstones (Calculus of gallbladder with acute cholecystitis without 

obstruction)


HTN 


Intractable pain


Intractable nausea


Covid 19 negative. 


Acute on chronic anemia 


EEG: No seizure activityFever  - better currently - intermittent could be from 

underlying pancreatitis blood cults 5/4 - neg so far


? Ileus with vomiting


Abd distention - U/S and CT reviewed s/p 0.4 L of opaque, debris-containing 

ascites was removed 5/6


Acute pancreatitis with persistent necrosis


Gallstone pancreatitis with necrosis. 


   -CT A/P 6/6 showed multiple pseudocysts, slight larger on the right. s/p 

drains x 3, 6/7.  + PSAE (MDRO-R Cefepime, Zosyn ANSON < 64) and yeast, 


   -s/p drain 4/27. C. parapsilosis. s/p drain 5/6 + yeast & high amylase; s/p 

additional drain on 5/8. Drains removed. 


Ascites s/p paracentesis 4/15 & 5/6. C. parapsilosis 


JED. off HD. 


A large fluid collection in the pancreatic bed has slightly decreased in size, 

described below, the pancreas itself is difficult to  visualize, which could be 

due to necrosis or obscuration of pancreatic  parenchyma from the surrounding 

fluid collection.6/15 


- 4/27 status post ROBERT drain placement + C paropsilosis. s/p additional drains 5/ 8


Anemia - S/p PRBCs


Cholelithiasis with thickening of the gallbladder wall.


Leucocytosis improving


JED, hyperkalemia, Metabolic acidosis off dialysis


hypocalcemia 


Prediabetes


HTN


s/p trach


ESRD on HD


Hyperglycemia


severe protein-caloric malnutrition


Moderate to large left pleural effusion with atelectasis and collapse  of most 

of the left lower lobe, stable





FEN - albumin, NS at 80 cc/hr, TPN


Off antibiotics 


PPX - SCDs, off lovenox currently, high risk for thrombosis but in light of her 

recent anemia and need for transfusion the risks do not outweigh benefits at 

this time. will continue to evaluate on a daily basis


FULL CODE


Dispo - ICU, critically ill


Poor prognosis


























37 MIN CC TIME





History of Present Illness


History of Present Illness


6/8: IR placed drain on 6/7. 4u PRBC after Hb drop. Hb 8.8 today. Off Levophed 

this morning. T-max 100.3. Much more lethargic today. CXR with left sided 

diffuse infiltrates.


6/9: Tachycardic overnight into the 140s. NGT clamped. On BIPAP currently. 

Drains with serosanguinous discharge. WBC 8, Tmax 99.6F.


6/10: Seen on [facility] in ICU.  Hypertensive and tachycardic. Labs stable. 

blood stained drainage from drains. Afebrile.


6/11: Seen on [facility] in ICU. She is a bit confused, drowsy, but when 

sitting up is conversational and confusion somewhat clears. She is asking for 

more pain medication. Stable drains, still very tachy.Na 147


6/12: Patient vomited overnight. Aspirated. Tried to pull her trach out, she was

told she would die without her trach, she said "I know, I just want to go home".

Hb 7.6. Afebrile, still very tachycardic. 1055ml out of right sided ROBERT drain


6/13: Overnight hypoxic, on BIPAP. CXR with left sided white out lung. 

Significant mucous plug suctioned by RT with improvement in her ABG after 2 

hours this morning. Not really active, tired, lethargic. 890ml out of drains 

past 24 hours. On vent. D/w daughter bedside.


6/14: Still on vent overnight with copious pulmonary drainage on suctioning. 

Labs stable, UOP stable 1L. Drain output still significant with 1505mL. She has 

shown her phone password [phone] and made it clear she does not want her daughters 

to access her phone at this time.


6:15: Patient quite frail, she has had such a lengthy hospital stay that she is 

certainly depressed and as documented 3 days ago is wanting to go home. Most 

likely patient is also tired of being hospitalized with an end point no where in

sight. Reassurance and encouragement provided during my visit. Plans for 

thoracentesis later in the day 


6/16: No acute events reported overnight, case discussed with nursing staff 

patient in no acute distress, complaints of the abdomimal pain during my visit, 

patient is quite depressed, reassurance has been provided, discussed with 

nursing staff at bedside, replace electrolytes 


6/17 off vent / on TS , no distress








6/25 /2020





Patient seen and examined ICU BED


guarded-long-term prognosis 


Sputum from 6/13 - growing PSA - may be colonization I to Meropenem add Cipro


Continue meropenem, has MDRP PSAE June 7 from abd


cont micafungin June 7


bleeding from Sx. site RLQ and firmness)stable


oncology consulted   


Cholelithiasis. Mild thickened appearance of the gallbladder wall. sono 6/25  

Mild right hydronephrosis.Fluid collection identified anterior to the pancreas. 

   


ct drainage 200 cc/24 hrs


d/c antibiotics , observe  6/25     


cxr pending 6/25 6/26/2020


Patient having trouble with secretions this morning, no other complaints, 

discussed with surgical ARNP. Plans for OR on Tuesday. No fever above 99.9 overn

ight, remains tachycardic, medications reviewed. 


  


      











36 MIN CC TIME








Date:  Apr 27, 2020





Pre-Op Diagnosis:


Necrotizing pancreatitis





Post-Op Diagnosis:


same





Procedure Performed:


laparoscopic exploration





Surgeon:


Frandy Flores


Asst:  Dr. Luis Santos





Anesthesia Type:


GETA plus local





Blood Loss:


50





Specimans Obtained:


cultures, debris





Findings:


1000 cc ascites suctioned off, cultures sent, diffuse debris, obliteration of 

surgical planes preventing any meaningful exploration




















 


 





 








6/1/2020


Patient seen and examined in the ICU


She appears extremely ill


She is tachypneic at 35 respirations per minute and tachycardic at 132 bpm


She is extremely encephalopathic and shaky


She appears clammy


Chart reviewed


Discussed with RN


Prognosis extremely guarded at best








5/31/2020


Patient still in ICU


Resting with no apparent distress


Chart reviewed








5/30/2020


Patient seen and examined in the ICU


She is wiping her face with a cough


Discussed with RN


Chart reviewed


We hope to get her out of the ICU later today if possible








5/29/2020


Patient seen and examined in the ICU once again


She is back on NG suction


On IV Zosyn


Has IV TPN


Sedated with Precedex but anxious still


Appears somewhat clammy and pale


Chart reviewed


Discussed with RN


She remains critically ill











 


 


BRIEF OPERATIVE NOTE


Pre-Op Diagnosis


Pancreatitis with pseudocysts, suspected infection


Post-Op Diagnosis


same


Procedure Performed


CT abdominal Drains x 3


Surgeon


Hardy


Anesthesia Type:  Conscious Sedation


Findings


3 abdominal drains, 14F, with turbid pancreatic fluid and necrotic debris in 

each.


Complications


No immediate








5/9: Patient today somewhat restless and having bilious secretions from ET tube,

imaging studies ordered, discussed with consultant. Pretty poor prognosis, 

hopefully is not a fistula, poor surgical candidate. 





5/10: Imaging with no acute events, she seems more stable today compared to 

yesterday. Encouraged as much activity as possible patient at high risk for 

severe depression.





Vitals


Vitals





Vital Signs








  Date Time  Temp Pulse Resp B/P (MAP) Pulse Ox O2 Delivery O2 Flow Rate FiO2


 


6/26/20 08:58     96 Tracheal Collar 8.0 


 


6/26/20 08:00 99.0 140 28 113/91 (98)    





 99.0       











Physical Exam


Physical Exam


GENERAL: Propped up in bed, resting quietly  watching tv


HEENT: 8 liters nc


NECK:  Tracheostomy 


LUNGS: Diminished aeration bases, no accessory muscle use - CT on left with 

clear fluid


HEART:  S1, S2,regular 


ABDOMEN: Mild distention, bowel sounds present, soft, grimaces to palpation, 

drains x 3


: [place] ( 6/7)


EXTREMITIES: + edema BLE


SKIN: no signs of gen rash 


LUE-PICC  without signs of complications


General:  Alert, Cooperative


Heart:  Regular rate (SR/ST), Other (distant heart sounds)


Lungs:  Other (diminshed in bases, Rhonci in LLL)


Abdomen:  Soft, Other (drains in place)


Extremities:  No cyanosis, Other (3+ bilateral LE pitting edema)


Skin:  No rashes, No significant lesion





Labs


LABS





Laboratory Tests








Test


 6/25/20


12:50 6/25/20


17:39 6/26/20


00:23 6/26/20


05:12


 


Glucose (Fingerstick)


 150 mg/dL


(70-99) 139 mg/dL


(70-99) 138 mg/dL


(70-99) 125 mg/dL


(70-99)


 


Test


 6/26/20


05:15 


 


 





 


White Blood Count


 10.9 x10^3/uL


(4.0-11.0) 


 


 





 


Red Blood Count


 3.01 x10^6/uL


(3.50-5.40) 


 


 





 


Hemoglobin


 8.6 g/dL


(12.0-15.5) 


 


 





 


Hematocrit


 25.5 %


(36.0-47.0) 


 


 





 


Mean Corpuscular Volume 85 fL ()    


 


Mean Corpuscular Hemoglobin 29 pg (25-35)    


 


Mean Corpuscular Hemoglobin


Concent 34 g/dL


(31-37) 


 


 





 


Red Cell Distribution Width


 18.1 %


(11.5-14.5) 


 


 





 


Platelet Count


 380 x10^3/uL


(140-400) 


 


 





 


Neutrophils (%) (Auto) 78 % (31-73)    


 


Lymphocytes (%) (Auto) 12 % (24-48)    


 


Monocytes (%) (Auto) 7 % (0-9)    


 


Eosinophils (%) (Auto) 2 % (0-3)    


 


Basophils (%) (Auto) 0 % (0-3)    


 


Neutrophils # (Auto)


 8.5 x10^3/uL


(1.8-7.7) 


 


 





 


Lymphocytes # (Auto)


 1.3 x10^3/uL


(1.0-4.8) 


 


 





 


Monocytes # (Auto)


 0.8 x10^3/uL


(0.0-1.1) 


 


 





 


Eosinophils # (Auto)


 0.2 x10^3/uL


(0.0-0.7) 


 


 





 


Basophils # (Auto)


 0.0 x10^3/uL


(0.0-0.2) 


 


 





 


Sodium Level


 137 mmol/L


(136-145) 


 


 





 


Potassium Level


 4.1 mmol/L


(3.5-5.1) 


 


 





 


Chloride Level


 101 mmol/L


() 


 


 





 


Carbon Dioxide Level


 36 mmol/L


(21-32) 


 


 





 


Anion Gap 0 (6-14)    


 


Blood Urea Nitrogen


 15 mg/dL


(7-20) 


 


 





 


Creatinine


 0.6 mg/dL


(0.6-1.0) 


 


 





 


Estimated GFR


(Cockcroft-Gault) 106.3 


 


 


 





 


BUN/Creatinine Ratio 25 (6-20)    


 


Glucose Level


 126 mg/dL


(70-99) 


 


 





 


Calcium Level


 10.4 mg/dL


(8.5-10.1) 


 


 





 


Phosphorus Level


 4.4 mg/dL


(2.6-4.7) 


 


 





 


Magnesium Level


 2.1 mg/dL


(1.8-2.4) 


 


 





 


Total Bilirubin


 0.5 mg/dL


(0.2-1.0) 


 


 





 


Aspartate Amino Transf


(AST/SGOT) 19 U/L (15-37) 


 


 


 





 


Alanine Aminotransferase


(ALT/SGPT) 14 U/L (14-59) 


 


 


 





 


Alkaline Phosphatase


 161 U/L


() 


 


 





 


Total Protein


 4.6 g/dL


(6.4-8.2) 


 


 





 


Albumin


 1.1 g/dL


(3.4-5.0) 


 


 





 


Albumin/Globulin Ratio 0.3 (1.0-1.7)    











Assessment and Plan


Assessmemt and Plan


Problems


Medical Problems:


(1) Acute pancreatitis


Status: Acute  





(2) Cholelithiasis


Status: Acute  











Comment


Review of Relevant


I have reviewed the following items josy (where applicable) has been applied.


Labs





Laboratory Tests








Test


 6/24/20


18:04 6/25/20


01:01 6/25/20


06:16 6/25/20


12:50


 


Glucose (Fingerstick)


 172 mg/dL


(70-99) 134 mg/dL


(70-99) 123 mg/dL


(70-99) 150 mg/dL


(70-99)


 


Test


 6/25/20


17:39 6/26/20


00:23 6/26/20


05:12 6/26/20


05:15


 


Glucose (Fingerstick)


 139 mg/dL


(70-99) 138 mg/dL


(70-99) 125 mg/dL


(70-99) 





 


White Blood Count


 


 


 


 10.9 x10^3/uL


(4.0-11.0)


 


Red Blood Count


 


 


 


 3.01 x10^6/uL


(3.50-5.40)


 


Hemoglobin


 


 


 


 8.6 g/dL


(12.0-15.5)


 


Hematocrit


 


 


 


 25.5 %


(36.0-47.0)


 


Mean Corpuscular Volume    85 fL () 


 


Mean Corpuscular Hemoglobin    29 pg (25-35) 


 


Mean Corpuscular Hemoglobin


Concent 


 


 


 34 g/dL


(31-37)


 


Red Cell Distribution Width


 


 


 


 18.1 %


(11.5-14.5)


 


Platelet Count


 


 


 


 380 x10^3/uL


(140-400)


 


Neutrophils (%) (Auto)    78 % (31-73) 


 


Lymphocytes (%) (Auto)    12 % (24-48) 


 


Monocytes (%) (Auto)    7 % (0-9) 


 


Eosinophils (%) (Auto)    2 % (0-3) 


 


Basophils (%) (Auto)    0 % (0-3) 


 


Neutrophils # (Auto)


 


 


 


 8.5 x10^3/uL


(1.8-7.7)


 


Lymphocytes # (Auto)


 


 


 


 1.3 x10^3/uL


(1.0-4.8)


 


Monocytes # (Auto)


 


 


 


 0.8 x10^3/uL


(0.0-1.1)


 


Eosinophils # (Auto)


 


 


 


 0.2 x10^3/uL


(0.0-0.7)


 


Basophils # (Auto)


 


 


 


 0.0 x10^3/uL


(0.0-0.2)


 


Sodium Level


 


 


 


 137 mmol/L


(136-145)


 


Potassium Level


 


 


 


 4.1 mmol/L


(3.5-5.1)


 


Chloride Level


 


 


 


 101 mmol/L


()


 


Carbon Dioxide Level


 


 


 


 36 mmol/L


(21-32)


 


Anion Gap    0 (6-14) 


 


Blood Urea Nitrogen


 


 


 


 15 mg/dL


(7-20)


 


Creatinine


 


 


 


 0.6 mg/dL


(0.6-1.0)


 


Estimated GFR


(Cockcroft-Gault) 


 


 


 106.3 





 


BUN/Creatinine Ratio    25 (6-20) 


 


Glucose Level


 


 


 


 126 mg/dL


(70-99)


 


Calcium Level


 


 


 


 10.4 mg/dL


(8.5-10.1)


 


Phosphorus Level


 


 


 


 4.4 mg/dL


(2.6-4.7)


 


Magnesium Level


 


 


 


 2.1 mg/dL


(1.8-2.4)


 


Total Bilirubin


 


 


 


 0.5 mg/dL


(0.2-1.0)


 


Aspartate Amino Transf


(AST/SGOT) 


 


 


 19 U/L (15-37) 





 


Alanine Aminotransferase


(ALT/SGPT) 


 


 


 14 U/L (14-59) 





 


Alkaline Phosphatase


 


 


 


 161 U/L


()


 


Total Protein


 


 


 


 4.6 g/dL


(6.4-8.2)


 


Albumin


 


 


 


 1.1 g/dL


(3.4-5.0)


 


Albumin/Globulin Ratio    0.3 (1.0-1.7) 








Laboratory Tests








Test


 6/25/20


12:50 6/25/20


17:39 6/26/20


00:23 6/26/20


05:12


 


Glucose (Fingerstick)


 150 mg/dL


(70-99) 139 mg/dL


(70-99) 138 mg/dL


(70-99) 125 mg/dL


(70-99)


 


Test


 6/26/20


05:15 


 


 





 


White Blood Count


 10.9 x10^3/uL


(4.0-11.0) 


 


 





 


Red Blood Count


 3.01 x10^6/uL


(3.50-5.40) 


 


 





 


Hemoglobin


 8.6 g/dL


(12.0-15.5) 


 


 





 


Hematocrit


 25.5 %


(36.0-47.0) 


 


 





 


Mean Corpuscular Volume 85 fL ()    


 


Mean Corpuscular Hemoglobin 29 pg (25-35)    


 


Mean Corpuscular Hemoglobin


Concent 34 g/dL


(31-37) 


 


 





 


Red Cell Distribution Width


 18.1 %


(11.5-14.5) 


 


 





 


Platelet Count


 380 x10^3/uL


(140-400) 


 


 





 


Neutrophils (%) (Auto) 78 % (31-73)    


 


Lymphocytes (%) (Auto) 12 % (24-48)    


 


Monocytes (%) (Auto) 7 % (0-9)    


 


Eosinophils (%) (Auto) 2 % (0-3)    


 


Basophils (%) (Auto) 0 % (0-3)    


 


Neutrophils # (Auto)


 8.5 x10^3/uL


(1.8-7.7) 


 


 





 


Lymphocytes # (Auto)


 1.3 x10^3/uL


(1.0-4.8) 


 


 





 


Monocytes # (Auto)


 0.8 x10^3/uL


(0.0-1.1) 


 


 





 


Eosinophils # (Auto)


 0.2 x10^3/uL


(0.0-0.7) 


 


 





 


Basophils # (Auto)


 0.0 x10^3/uL


(0.0-0.2) 


 


 





 


Sodium Level


 137 mmol/L


(136-145) 


 


 





 


Potassium Level


 4.1 mmol/L


(3.5-5.1) 


 


 





 


Chloride Level


 101 mmol/L


() 


 


 





 


Carbon Dioxide Level


 36 mmol/L


(21-32) 


 


 





 


Anion Gap 0 (6-14)    


 


Blood Urea Nitrogen


 15 mg/dL


(7-20) 


 


 





 


Creatinine


 0.6 mg/dL


(0.6-1.0) 


 


 





 


Estimated GFR


(Cockcroft-Gault) 106.3 


 


 


 





 


BUN/Creatinine Ratio 25 (6-20)    


 


Glucose Level


 126 mg/dL


(70-99) 


 


 





 


Calcium Level


 10.4 mg/dL


(8.5-10.1) 


 


 





 


Phosphorus Level


 4.4 mg/dL


(2.6-4.7) 


 


 





 


Magnesium Level


 2.1 mg/dL


(1.8-2.4) 


 


 





 


Total Bilirubin


 0.5 mg/dL


(0.2-1.0) 


 


 





 


Aspartate Amino Transf


(AST/SGOT) 19 U/L (15-37) 


 


 


 





 


Alanine Aminotransferase


(ALT/SGPT) 14 U/L (14-59) 


 


 


 





 


Alkaline Phosphatase


 161 U/L


() 


 


 





 


Total Protein


 4.6 g/dL


(6.4-8.2) 


 


 





 


Albumin


 1.1 g/dL


(3.4-5.0) 


 


 





 


Albumin/Globulin Ratio 0.3 (1.0-1.7)    








Microbiology


6/18/20 Blood Culture - Final, Complete


          NO GROWTH AFTER 5 DAYS


6/15/20 Gram Stain - Final, Complete


          


6/15/20 Aerobic and Anaerobic Culture - Final, Complete


          


6/13/20 Gram Stain Evaluation - Final, Complete


          


6/13/20 Respiratory Culture - Final, Complete


          


6/13/20 Antimicrobic Susceptibility - Final, Complete


          


6/7/20 Urine Culture - Final, Complete


         


5/30/20 Gram Stain - Final, Complete


          


5/30/20 Aerobic Culture - Final, Complete


Medications





Current Medications


Sodium Chloride 1,000 ml @  1,000 mls/hr Q1H IV  Last administered on 3/16/20at 

03:00;  Start 3/16/20 at 03:00;  Stop 3/16/20 at 03:59;  Status DC


Ondansetron HCl (Zofran) 4 mg 1X  ONCE IVP  Last administered on 3/16/20at 

03:27;  Start 3/16/20 at 03:00;  Stop 3/16/20 at 03:01;  Status DC


Morphine Sulfate (Morphine Sulfate) 4 mg 1X  ONCE IV ;  Start 3/16/20 at 03:00; 

Stop 3/16/20 at 03:01;  Status Cancel


Ketorolac Tromethamine (Toradol 30mg Vial) 30 mg 1X  ONCE IV  Last administered 

on 3/16/20at 02:54;  Start 3/16/20 at 03:00;  Stop 3/16/20 at 03:01;  Status DC


Fentanyl Citrate (Fentanyl 2ml Vial) 25 mcg 1X  ONCE IVP  Last administered on 

3/16/20at 03:23;  Start 3/16/20 at 03:30;  Stop 3/16/20 at 03:31;  Status DC


Fentanyl Citrate (Fentanyl 2ml Vial) 100 mcg STK-MED ONCE .ROUTE ;  Start 

3/16/20 at 03:18;  Stop 3/16/20 at 03:18;  Status DC


Iohexol (Omnipaque 350 Mg/ml) 90 ml 1X  ONCE IV  Last administered on 3/16/20at 

03:25;  Start 3/16/20 at 03:30;  Stop 3/16/20 at 03:31;  Status DC


Info (CONTRAST GIVEN -- Rx MONITORING) 1 each PRN DAILY  PRN MC SEE COMMENTS;  

Start 3/16/20 at 03:30;  Stop 3/18/20 at 03:29;  Status DC


Hydromorphone HCl (Dilaudid) 0.5 mg 1X  ONCE IV  Last administered on 3/16/20at 

03:55;  Start 3/16/20 at 04:30;  Stop 3/16/20 at 04:32;  Status DC


Ondansetron HCl (Zofran) 4 mg PRN Q8HRS  PRN IV NAUSEA/VOMITING 1ST CHOICE;  

Start 3/16/20 at 05:00;  Stop 3/16/20 at 09:27;  Status DC


Morphine Sulfate (Morphine Sulfate) 2 mg PRN Q2HR  PRN IV SEVERE PAIN 7-10 Last 

administered on 3/17/20at 12:26;  Start 3/16/20 at 05:00;  Stop 3/17/20 at 

14:15;  Status DC


Sodium Chloride 1,000 ml @  125 mls/hr Q8H IV  Last administered on 3/16/20at 

20:56;  Start 3/16/20 at 05:00;  Stop 3/17/20 at 04:59;  Status DC


Hydromorphone HCl (Dilaudid) 0.5 mg PRN Q3HRS  PRN IV SEVERE PAIN 7-10 Last 

administered on 3/17/20at 10:06;  Start 3/16/20 at 05:00;  Stop 3/17/20 at 

12:01;  Status DC


Piperacillin Sod/ Tazobactam Sod 4.5 gm/Sodium Chloride 100 ml @  200 mls/hr 1X 

ONCE IV  Last administered on 3/16/20at 05:44;  Start 3/16/20 at 06:00;  Stop 

3/16/20 at 06:29;  Status DC


Ondansetron HCl (Zofran) 4 mg PRN Q4HRS  PRN IV NAUSEA/VOMITING 1ST CHOICE Last 

administered on 6/23/20at 10:32;  Start 3/16/20 at 09:30


Insulin Human Lispro (HumaLOG) 0-9 UNITS Q6HRS SQ  Last administered on 

6/24/20at 18:05;  Start 3/16/20 at 09:30


Dextrose (Dextrose 50%-Water Syringe) 12.5 gm PRN Q15MIN  PRN IV SEE COMMENTS;  

Start 3/16/20 at 09:30


Pantoprazole Sodium (PROTONIX VIAL for IV PUSH) 40 mg DAILYAC IVP  Last 

administered on 6/26/20at 07:30;  Start 3/16/20 at 11:30


Prochlorperazine Edisylate (Compazine) 10 mg PRN Q6HRS  PRN IV NAUSEA/VOMITING, 

2nd CHOICE Last administered on 6/24/20at 11:56;  Start 3/16/20 at 17:45


Atenolol (Tenormin) 100 mg DAILY PO ;  Start 3/17/20 at 09:00;  Stop 3/16/20 at 

20:08;  Status DC


Metoprolol Tartrate (Lopressor Vial) 2.5 mg Q6HRS IVP  Last administered on 

3/17/20at 05:51;  Start 3/16/20 at 20:15;  Stop 3/17/20 at 10:02;  Status DC


Metoprolol Tartrate (Lopressor Vial) 5 mg Q6HRS IVP  Last administered on 

3/26/20at 00:12;  Start 3/17/20 at 10:15;  Stop 3/28/20 at 08:48;  Status DC


Hydromorphone HCl (Dilaudid) 1 mg PRN Q3HRS  PRN IV SEVERE PAIN 7-10 Last 

administered on 3/23/20at 05:13;  Start 3/17/20 at 12:00;  Stop 3/31/20 at 

00:25;  Status DC


Lidocaine HCl (Buffered Lidocaine 1%) 3 ml STK-MED ONCE .ROUTE ;  Start 3/17/20 

at 12:55;  Stop 3/17/20 at 12:56;  Status DC


Albumin Human 500 ml @  125 mls/hr 1X  ONCE IV  Last administered on 3/17/20at 

14:33;  Start 3/17/20 at 14:30;  Stop 3/17/20 at 18:32;  Status DC


Norepinephrine Bitartrate 8 mg/ Dextrose 258 ml @  17.299 mls/ hr CONT  PRN IV 

PER PROTOCOL Last administered on 4/14/20at 12:48;  Start 3/17/20 at 15:30;  

Stop 4/17/20 at 09:19;  Status DC


Sodium Chloride 1,000 ml @  125 mls/hr Q8H IV  Last administered on 3/17/20at 

21:04;  Start 3/17/20 at 16:00;  Stop 3/18/20 at 02:42;  Status DC


Albumin Human 500 ml @  125 mls/hr PRN BID  PRN IV After every 2L NSS & BP < 

90mm Last administered on 6/6/20at 11:40;  Start 3/17/20 at 16:00


Iohexol (Omnipaque 300 Mg/ml) 60 ml 1X  ONCE IV  Last administered on 3/17/20at 

17:20;  Start 3/17/20 at 17:00;  Stop 3/17/20 at 17:01;  Status DC


Info (CONTRAST GIVEN -- Rx MONITORING) 1 each PRN DAILY  PRN MC SEE COMMENTS;  

Start 3/17/20 at 17:00;  Stop 3/19/20 at 16:59;  Status DC


Meropenem 1 gm/ Sodium Chloride 100 ml @  200 mls/hr Q8HRS IV  Last administered

on 3/18/20at 05:45;  Start 3/17/20 at 20:00;  Stop 3/18/20 at 08:48;  Status DC


Furosemide (Lasix) 40 mg 1X  ONCE IVP  Last administered on 3/17/20at 22:12;  

Start 3/17/20 at 22:30;  Stop 3/17/20 at 22:31;  Status DC


Calcium Chloride 1000 mg/Sodium Chloride 110 ml @  220 mls/hr 1X  ONCE IV  Last 

administered on 3/17/20at 22:11;  Start 3/17/20 at 22:30;  Stop 3/17/20 at 

22:59;  Status DC


Albuterol Sulfate (Ventolin Neb Soln) 2.5 mg 1X  ONCE NEB  Last administered on 

3/18/20at 00:56;  Start 3/17/20 at 22:30;  Stop 3/17/20 at 22:31;  Status DC


Insulin Human Regular (HumuLIN R VIAL) 5 unit 1X  ONCE IV  Last administered on 

3/17/20at 22:14;  Start 3/17/20 at 22:30;  Stop 3/17/20 at 22:31;  Status DC


Magnesium Sulfate 50 ml @ 25 mls/hr 1X  ONCE IV  Last administered on 3/18/20at 

02:57;  Start 3/18/20 at 03:00;  Stop 3/18/20 at 04:59;  Status DC


Calcium Gluconate 1000 mg/Sodium Chloride 110 ml @  220 mls/hr 1X  ONCE IV  Last

administered on 3/18/20at 02:46;  Start 3/18/20 at 03:00;  Stop 3/18/20 at 

03:29;  Status DC


Sodium Chloride 1,000 ml @  200 mls/hr Q5H IV  Last administered on 3/18/20at 

02:46;  Start 3/18/20 at 03:00;  Stop 3/18/20 at 10:21;  Status DC


Calcium Gluconate 1000 mg/Sodium Chloride 110 ml @  220 mls/hr 1X  ONCE IV  Last

administered on 3/18/20at 03:21;  Start 3/18/20 at 03:30;  Stop 3/18/20 at 

03:59;  Status DC


Sodium Bicarbonate 50 meq/Sodium Chloride 1,050 ml @  75 mls/hr Q14H IV  Last 

administered on 3/22/20at 21:10;  Start 3/18/20 at 07:30;  Stop 3/23/20 at 

10:28;  Status DC


Calcium Gluconate 2000 mg/Sodium Chloride 120 ml @  220 mls/hr 1X  ONCE IV  Last

administered on 3/18/20at 09:05;  Start 3/18/20 at 07:30;  Stop 3/18/20 at 

08:02;  Status DC


Lidocaine HCl (Xylocaine-Mpf 1% 2ml Vial) 2 ml STK-MED ONCE .ROUTE ;  Start 

3/18/20 at 08:47;  Stop 3/18/20 at 08:47;  Status DC


Meropenem 500 mg/ Sodium Chloride 50 ml @  100 mls/hr Q12HR IV  Last 

administered on 3/23/20at 21:01;  Start 3/18/20 at 18:00;  Stop 3/24/20 at 

07:58;  Status DC


Lidocaine HCl (Buffered Lidocaine 1%) 3 ml STK-MED ONCE .ROUTE ;  Start 3/18/20 

at 09:46;  Stop 3/18/20 at 09:46;  Status DC


Lidocaine HCl (Buffered Lidocaine 1%) 6 ml 1X  ONCE INJ  Last administered on 

3/18/20at 10:26;  Start 3/18/20 at 10:15;  Stop 3/18/20 at 10:16;  Status DC


Info (Tpn Per Pharmacy) 1 each PRN DAILY  PRN MC SEE COMMENTS Last administered 

on 6/25/20at 10:29;  Start 3/18/20 at 12:00


Sodium Chloride 1,000 ml @  1,000 mls/hr Q1H PRN IV hypotension;  Start 3/18/20 

at 12:07;  Stop 3/18/20 at 18:06;  Status DC


Diphenhydramine HCl (Benadryl) 25 mg 1X PRN  PRN IV ITCHING;  Start 3/18/20 at 

12:15;  Stop 3/19/20 at 12:14;  Status DC


Diphenhydramine HCl (Benadryl) 25 mg 1X PRN  PRN IV ITCHING;  Start 3/18/20 at 

12:15;  Stop 3/19/20 at 12:14;  Status DC


Sodium Chloride 1,000 ml @  400 mls/hr Q2H30M PRN IV PATENCY;  Start 3/18/20 at 

12:07;  Stop 3/19/20 at 00:06;  Status DC


Info (PHARMACY MONITORING -- do not chart) 1 each PRN DAILY  PRN MC SEE 

COMMENTS;  Start 3/18/20 at 12:15;  Stop 3/20/20 at 08:13;  Status DC


Sodium Chloride 90 meq/Calcium Gluconate 10 meq/ Multivitamins 10 ml/Chromium/ 

Copper/Manganese/ Seleni/Zn 1 ml/ Total Parenteral Nutrition/Amino 

Acids/Dextrose/ Fat Emulsion Intravenous 55.005 ml  @ 2.292 mls/hr TPN  CONT IV 

;  Start 3/18/20 at 22:00;  Stop 3/18/20 at 12:33;  Status DC


Info (Tpn Per Pharmacy) 1 each PRN DAILY  PRN MC SEE COMMENTS;  Start 3/18/20 at

12:30;  Status UNV


Sodium Chloride 90 meq/Calcium Gluconate 10 meq/ Multivitamins 10 ml/Chromium/ 

Copper/Manganese/ Seleni/Zn 0.5 ml/ Total Parenteral Nutrition/Amino 

Acids/Dextrose/ Fat Emulsion Intravenous 1,512 ml @  63 mls/hr TPN  CONT IV  

Last administered on 3/18/20at 22:06;  Start 3/18/20 at 22:00;  Stop 3/19/20 at 

21:59;  Status DC


Calcium Carbonate/ Glycine (Tums) 500 mg PRN AFTMEALHC  PRN PO INDIGESTION;  

Start 3/18/20 at 17:45;  Stop 5/13/20 at 10:25;  Status DC


Calcium Gluconate (Calcium Gluconate) 2,000 mg 1X  ONCE IVP  Last administered 

on 3/19/20at 02:19;  Start 3/19/20 at 02:15;  Stop 3/19/20 at 02:16;  Status DC


Calcium Chloride 3000 mg/Sodium Chloride 1,030 ml @  50 mls/hr [id number] IV  Last 

administered on 3/21/20at 02:17;  Start 3/19/20 at 08:00;  Stop 3/21/20 at 

15:23;  Status DC


Lorazepam (Ativan Inj) 1 mg PRN Q4HRS  PRN IVP ANXIETY / AGITATION, 2nd choic 

Last administered on 4/17/20at 03:51;  Start 3/19/20 at 09:00;  Stop 4/17/20 at 

09:19;  Status DC


Sodium Chloride 1,000 ml @  1,000 mls/hr Q1H PRN IV hypotension;  Start 3/19/20 

at 08:56;  Stop 3/19/20 at 14:55;  Status DC


Albumin Human 200 ml @  200 mls/hr 1X PRN  PRN IV Hypotension;  Start 3/19/20 at

09:00;  Stop 3/19/20 at 14:59;  Status DC


Diphenhydramine HCl (Benadryl) 25 mg 1X PRN  PRN IV ITCHING;  Start 3/19/20 at 

09:00;  Stop 3/20/20 at 08:59;  Status DC


Diphenhydramine HCl (Benadryl) 25 mg 1X PRN  PRN IV ITCHING;  Start 3/19/20 at 

09:00;  Stop 3/20/20 at 08:59;  Status DC


Sodium Chloride 1,000 ml @  400 mls/hr Q2H30M PRN IV PATENCY;  Start 3/19/20 at 

08:56;  Stop 3/19/20 at 20:55;  Status DC


Info (PHARMACY MONITORING -- do not chart) 1 each PRN DAILY  PRN MC SEE 

COMMENTS;  Start 3/19/20 at 09:00;  Status UNV


Info (PHARMACY MONITORING -- do not chart) 1 each PRN DAILY  PRN MC SEE 

COMMENTS;  Start 3/19/20 at 09:00;  Stop 3/20/20 at 08:13;  Status DC


Digoxin (Lanoxin) 500 mcg 1X  ONCE IV  Last administered on 3/19/20at 10:04;  

Start 3/19/20 at 10:00;  Stop 3/19/20 at 10:01;  Status DC


Digoxin (Lanoxin) 125 mcg 1X  ONCE IV  Last administered on 3/19/20at 17:10;  

Start 3/19/20 at 18:00;  Stop 3/19/20 at 18:01;  Status DC


Magnesium Sulfate 100 ml @  25 mls/hr 1X  ONCE IV  Last administered on 

3/19/20at 12:48;  Start 3/19/20 at 13:00;  Stop 3/19/20 at 16:59;  Status DC


Sodium Chloride 90 meq/Magnesium Sulfate 10 meq/ Calcium Gluconate 20 meq/ Mu

ltivitamins 10 ml/Chromium/ Copper/Manganese/ Seleni/Zn 0.5 ml/ Total Parenteral

Nutrition/Amino Acids/Dextrose/ Fat Emulsion Intravenous 1,512 ml @  63 mls/hr 

TPN  CONT IV  Last administered on 3/19/20at 22:25;  Start 3/19/20 at 22:00;  

Stop 3/20/20 at 21:59;  Status DC


Sodium Chloride 1,000 ml @  1,000 mls/hr Q1H PRN IV hypotension;  Start 3/20/20 

at 08:05;  Stop 3/20/20 at 14:04;  Status DC


Albumin Human 200 ml @  200 mls/hr 1X  ONCE IV  Last administered on 3/20/20at 

08:57;  Start 3/20/20 at 08:15;  Stop 3/20/20 at 09:14;  Status DC


Diphenhydramine HCl (Benadryl) 25 mg 1X PRN  PRN IV ITCHING;  Start 3/20/20 at 

08:15;  Stop 3/21/20 at 08:14;  Status DC


Diphenhydramine HCl (Benadryl) 25 mg 1X PRN  PRN IV ITCHING;  Start 3/20/20 at 

08:15;  Stop 3/21/20 at 08:14;  Status DC


Sodium Chloride 1,000 ml @  400 mls/hr Q2H30M PRN IV PATENCY;  Start 3/20/20 at 

08:05;  Stop 3/20/20 at 20:04;  Status DC


Info (PHARMACY MONITORING -- do not chart) 1 each PRN DAILY  PRN MC SEE 

COMMENTS;  Start 3/20/20 at 08:15;  Stop 3/24/20 at 07:57;  Status DC


Sodium Chloride 90 meq/Potassium Chloride 15 meq/ Potassium Phosphate 10 mmol/ 

Magnesium Sulfate 10 meq/Calcium Gluconate 20 meq/ Multivitamins 10 ml/Chromium/

Copper/Manganese/ Seleni/Zn 0.5 ml/ Total Parenteral Nutrition/Amino Ac

ids/Dextrose/ Fat Emulsion Intravenous 1,512 ml @  63 mls/hr TPN  CONT IV  Last 

administered on 3/20/20at 21:01;  Start 3/20/20 at 22:00;  Stop 3/21/20 at 

21:59;  Status DC


Potassium Chloride/Water 100 ml @  100 mls/hr 1X  ONCE IV  Last administered on 

3/20/20at 14:09;  Start 3/20/20 at 14:00;  Stop 3/20/20 at 14:59;  Status DC


Benzocaine (Hurricaine One) 1 spray 1X  ONCE MM  Last administered on 3/20/20at 

16:38;  Start 3/20/20 at 14:30;  Stop 3/20/20 at 14:31;  Status DC


Lidocaine HCl (Glydo (Lidocaine) Jelly) 1 thomas 1X  ONCE MM  Last administered on 

3/20/20at 16:38;  Start 3/20/20 at 14:30;  Stop 3/20/20 at 14:31;  Status DC


Linezolid/Dextrose 300 ml @  300 mls/hr Q12HR IV  Last administered on 3/26/20at

21:04;  Start 3/20/20 at 20:00;  Stop 3/27/20 at 07:50;  Status DC


Acetaminophen (Tylenol) 650 mg PRN Q6HRS  PRN PO MILD PAIN / TEMP;  Start 

3/21/20 at 03:30;  Stop 3/21/20 at 03:36;  Status DC


Acetaminophen (Tylenol) 650 mg PRN Q6HRS  PRN PEG MILD PAIN / TEMP Last 

administered on 4/16/20at 19:56;  Start 3/21/20 at 03:36;  Stop 5/13/20 at 

10:25;  Status DC


Sodium Chloride 1,000 ml @  1,000 mls/hr Q1H PRN IV hypotension;  Start 3/21/20 

at 07:50;  Stop 3/21/20 at 13:49;  Status DC


Albumin Human 200 ml @  200 mls/hr 1X PRN  PRN IV Hypotension;  Start 3/21/20 at

08:00;  Stop 3/21/20 at 13:59;  Status DC


Sodium Chloride (Normal Saline Flush) 10 ml 1X PRN  PRN IV AP catheter pack;  

Start 3/21/20 at 08:00;  Stop 3/22/20 at 07:59;  Status DC


Sodium Chloride (Normal Saline Flush) 10 ml 1X PRN  PRN IV  catheter pack;  

Start 3/21/20 at 08:00;  Stop 3/22/20 at 07:59;  Status DC


Sodium Chloride 1,000 ml @  400 mls/hr Q2H30M PRN IV PATENCY;  Start 3/21/20 at 

07:50;  Stop 3/21/20 at 19:49;  Status DC


Info (PHARMACY MONITORING -- do not chart) 1 each PRN DAILY  PRN MC SEE CO

MMENTS;  Start 3/21/20 at 08:00;  Status UNV


Info (PHARMACY MONITORING -- do not chart) 1 each PRN DAILY  PRN MC SEE 

COMMENTS;  Start 3/21/20 at 08:00;  Stop 3/23/20 at 08:25;  Status DC


Sodium Chloride 90 meq/Potassium Chloride 15 meq/ Potassium Phosphate 10 mmol/ 

Magnesium Sulfate 10 meq/Calcium Gluconate 20 meq/ Multivitamins 10 ml/Chromium/

Copper/Manganese/ Seleni/Zn 0.5 ml/ Total Parenteral Nutrition/Amino 

Acids/Dextrose/ Fat Emulsion Intravenous 1,512 ml @  63 mls/hr TPN  CONT IV  

Last administered on 3/21/20at 20:57;  Start 3/21/20 at 22:00;  Stop 3/22/20 at 

21:59;  Status DC


Sodium Chloride 90 meq/Potassium Chloride 15 meq/ Potassium Phosphate 15 mmol/ 

Magnesium Sulfate 10 meq/Calcium Gluconate 20 meq/ Multivitamins 10 ml/Chromium/

Copper/Manganese/ Seleni/Zn 0.5 ml/ Total Parenteral Nutrition/Amino 

Acids/Dextrose/ Fat Emulsion Intravenous 1,512 ml @  63 mls/hr TPN  CONT IV ;  

Start 3/22/20 at 22:00;  Stop 3/22/20 at 14:16;  Status DC


Sodium Chloride 90 meq/Potassium Chloride 15 meq/ Potassium Phosphate 15 mmol/ 

Magnesium Sulfate 10 meq/Calcium Gluconate 20 meq/ Multivitamins 10 ml/Chromium/

Copper/Manganese/ Seleni/Zn 0.5 ml/ Total Parenteral Nutrition/Amino 

Acids/Dextrose/ Fat Emulsion Intravenous 1,200 ml @  50 mls/hr TPN  CONT IV ;  

Start 3/22/20 at 22:00;  Stop 3/22/20 at 14:17;  Status DC


Sodium Chloride 90 meq/Potassium Chloride 15 meq/ Potassium Phosphate 10 mmol/ 

Magnesium Sulfate 10 meq/Calcium Gluconate 20 meq/ Multivitamins 10 ml/Chromium/

Copper/Manganese/ Seleni/Zn 0.5 ml/ Total Parenteral Nutrition/Amino 

Acids/Dextrose/ Fat Emulsion Intravenous 1,200 ml @  50 mls/hr TPN  CONT IV  

Last administered on 3/22/20at 23:29;  Start 3/22/20 at 22:00;  Stop 3/23/20 at 

21:59;  Status DC


Sodium Chloride 1,000 ml @  1,000 mls/hr Q1H PRN IV hypotension;  Start 3/23/20 

at 07:28;  Stop 3/23/20 at 13:27;  Status DC


Albumin Human 200 ml @  200 mls/hr 1X  ONCE IV  Last administered on 3/23/20at 

08:51;  Start 3/23/20 at 07:30;  Stop 3/23/20 at 08:29;  Status DC


Diphenhydramine HCl (Benadryl) 25 mg 1X PRN  PRN IV ITCHING;  Start 3/23/20 at 

07:30;  Stop 3/24/20 at 07:29;  Status DC


Diphenhydramine HCl (Benadryl) 25 mg 1X PRN  PRN IV ITCHING;  Start 3/23/20 at 

07:30;  Stop 3/24/20 at 07:29;  Status DC


Sodium Chloride 1,000 ml @  400 mls/hr Q2H30M PRN IV PATENCY;  Start 3/23/20 at 

07:28;  Stop 3/23/20 at 19:27;  Status DC


Info (PHARMACY MONITORING -- do not chart) 1 each PRN DAILY  PRN MC SEE 

COMMENTS;  Start 3/23/20 at 07:30;  Stop 4/3/20 at 13:01;  Status DC


Metronidazole 100 ml @  100 mls/hr Q6HRS IV  Last administered on 4/8/20at 

06:26;  Start 3/23/20 at 08:30;  Stop 4/8/20 at 09:58;  Status DC


Micafungin Sodium 100 mg/Dextrose 100 ml @  100 mls/hr Q24H IV  Last 

administered on 4/30/20at 08:18;  Start 3/23/20 at 09:00;  Stop 4/30/20 at 

20:58;  Status DC


Propofol 0 ml @ As Directed STK-MED ONCE IV ;  Start 3/23/20 at 07:53;  Stop 

3/23/20 at 07:53;  Status DC


Etomidate (Amidate) 20 mg STK-MED ONCE IV ;  Start 3/23/20 at 07:53;  Stop 

3/23/20 at 07:54;  Status DC


Midazolam HCl (Versed) 5 mg STK-MED ONCE .ROUTE ;  Start 3/23/20 at 07:57;  Stop

3/23/20 at 07:57;  Status DC


Fentanyl Citrate 30 ml @ 0 mls/hr CONT  PRN IV SEE PROTOCOL Last administered on

4/17/20at 06:12;  Start 3/23/20 at 08:15;  Stop 4/17/20 at 09:19;  Status DC


Artificial Tears (Artificial Tears) 1 drop PRN Q1HR  PRN OU DRY EYE, 1st choice;

 Start 3/23/20 at 08:15;  Stop 4/29/20 at 05:31;  Status DC


Midazolam HCl 50 mg/Sodium Chloride 50 ml @ 0 mls/hr CONT  PRN IV SEE PROTOCOL 

Last administered on 3/26/20at 22:39;  Start 3/23/20 at 08:15;  Stop 3/28/20 at 

15:59;  Status DC


Etomidate (Amidate) 8 mg 1X  ONCE IV  Last administered on 3/23/20at 08:33;  

Start 3/23/20 at 08:30;  Stop 3/23/20 at 08:31;  Status DC


Succinylcholine Chloride (Anectine) 120 mg 1X  ONCE IV  Last administered on 

3/23/20at 08:34;  Start 3/23/20 at 08:30;  Stop 3/23/20 at 08:31;  Status DC


Midazolam HCl (Versed) 5 mg 1X  ONCE IV ;  Start 3/23/20 at 08:30;  Stop 3/23/20

at 08:31;  Status DC


Potassium Chloride 15 meq/ Bicarbonate Dialysis Soln w/ out KCl 5,007.5 ml  @ 

1,000 mls/ hr Q5H1M IV  Last administered on 3/24/20at 11:11;  Start 3/23/20 at 

12:00;  Stop 3/24/20 at 11:15;  Status DC


Potassium Chloride 15 meq/ Bicarbonate Dialysis Soln w/ out KCl 5,007.5 ml  @ 

1,000 mls/ hr Q5H1M IV  Last administered on 3/24/20at 11:12;  Start 3/23/20 at 

12:00;  Stop 3/24/20 at 11:17;  Status DC


Potassium Chloride 15 meq/ Bicarbonate Dialysis Soln w/ out KCl 5,007.5 ml  @ 

1,000 mls/ hr Q5H1M IV  Last administered on 3/24/20at 11:11;  Start 3/23/20 at 

12:00;  Stop 3/24/20 at 11:19;  Status DC


Sodium Chloride 90 meq/Potassium Chloride 15 meq/ Potassium Phosphate 10 mmol/ 

Magnesium Sulfate 10 meq/Calcium Gluconate 20 meq/ Multivitamins 10 ml/Chromium/

Copper/Manganese/ Seleni/Zn 0.5 ml/ Total Parenteral Nutrition/Amino 

Acids/Dextrose/ Fat Emulsion Intravenous 1,400 ml @  58.333 mls/ hr TPN  CONT IV

 Last administered on 3/23/20at 21:42;  Start 3/23/20 at 22:00;  Stop 3/24/20 at

21:59;  Status DC


Heparin Sodium (Porcine) (Heparin Sodium) 5,000 unit Q8HRS SQ  Last administered

on 3/28/20at 05:55;  Start 3/23/20 at 15:00;  Stop 3/28/20 at 13:28;  Status DC


Meropenem 500 mg/ Sodium Chloride 50 ml @  100 mls/hr Q6HRS IV  Last 

administered on 3/25/20at 06:00;  Start 3/24/20 at 09:00;  Stop 3/25/20 at 

07:29;  Status DC


Potassium Phosphate 20 mmol/ Sodium Chloride 106.6667 ml @  51.667 m... 1X  ONCE

IV  Last administered on 3/24/20at 11:22;  Start 3/24/20 at 10:15;  Stop 3/24/20

at 12:18;  Status DC


Acetaminophen (Tylenol Supp) 650 mg PRN Q6HRS  PRN [place] MILD PAIN / TEMP > 100.3'F

Last administered on 6/18/20at 10:16;  Start 3/24/20 at 10:30


Potassium Chloride/Water 100 ml @  100 mls/hr Q1H IV  Last administered on 

3/24/20at 12:12;  Start 3/24/20 at 11:00;  Stop 3/24/20 at 12:59;  Status DC


Potassium Chloride 20 meq/ Bicarbonate Dialysis Soln w/ out KCl 5,010 ml @  

1,000 mls/hr Q5H1M IV  Last administered on 3/25/20at 08:48;  Start 3/24/20 at 

12:00;  Stop 3/25/20 at 13:03;  Status DC


Potassium Chloride 20 meq/ Bicarbonate Dialysis Soln w/ out KCl 5,010 ml @  

1,000 mls/hr Q5H1M IV  Last administered on 3/29/20at 14:52;  Start 3/24/20 at 

11:30;  Stop 3/29/20 at 19:59;  Status DC


Potassium Chloride 20 meq/ Bicarbonate Dialysis Soln w/ out KCl 5,010 ml @  

1,000 mls/hr Q5H1M IV  Last administered on 3/29/20at 14:53;  Start 3/24/20 at 

11:30;  Stop 3/29/20 at 19:59;  Status DC


Sodium Chloride 90 meq/Potassium Chloride 15 meq/ Potassium Phosphate 15 mmol/ 

Magnesium Sulfate 10 meq/Calcium Gluconate 15 meq/ Multivitamins 10 ml/Chromium/

Copper/Manganese/ Seleni/Zn 0.5 ml/ Total Parenteral Nutrition/Amino 

Acids/Dextrose/ Fat Emulsion Intravenous 1,400 ml @  58.333 mls/ hr TPN  CONT IV

 Last administered on 3/24/20at 22:17;  Start 3/24/20 at 22:00;  Stop 3/25/20 at

21:59;  Status DC


Cefepime HCl (Maxipime) 2 gm Q12HR IVP  Last administered on 4/7/20at 20:56;  

Start 3/25/20 at 09:00;  Stop 4/8/20 at 09:58;  Status DC


Daptomycin 500 mg/ Sodium Chloride 50 ml @  100 mls/hr Q48H IV  Last 

administered on 4/10/20at 09:57;  Start 3/25/20 at 08:30;  Stop 4/10/20 at 

10:07;  Status DC


Lidocaine HCl (Buffered Lidocaine 1%) 3 ml 1X  ONCE INJ  Last administered on 

3/25/20at 10:27;  Start 3/25/20 at 10:30;  Stop 3/25/20 at 10:31;  Status DC


Potassium Phosphate 20 mmol/ Sodium Chloride 106.6667 ml @  51.667 m... 1X  ONCE

IV  Last administered on 3/25/20at 12:51;  Start 3/25/20 at 13:00;  Stop 3/25/20

at 15:03;  Status DC


Sodium Chloride 90 meq/Potassium Chloride 15 meq/ Potassium Phosphate 18 mmol/ 

Magnesium Sulfate 8 meq/Calcium Gluconate 15 meq/ Multivitamins 10 ml/Chromium/ 

Copper/Manganese/ Seleni/Zn 0.5 ml/ Total Parenteral Nutrition/Amino Acids/Dext

verenice/ Fat Emulsion Intravenous 1,400 ml @  58.333 mls/ hr TPN  CONT IV  Last 

administered on 3/25/20at 22:16;  Start 3/25/20 at 22:00;  Stop 3/26/20 at 

21:59;  Status DC


Potassium Chloride 20 meq/ Bicarbonate Dialysis Soln w/ out KCl 5,010 ml @  

1,000 mls/hr Q5H1M IV  Last administered on 3/29/20at 14:54;  Start 3/25/20 at 

16:00;  Stop 3/29/20 at 19:59;  Status DC


Multi-Ingred Cream/Lotion/Oil/ Oint (Artificial Tears Eye Ointment) 1 thomas PRN 

Q1HR  PRN OU DRY EYE, 2nd choice Last administered on 4/13/20at 08:19;  Start 

3/25/20 at 17:30;  Stop 6/3/20 at 14:39;  Status DC


Sodium Chloride 90 meq/Potassium Chloride 15 meq/ Potassium Phosphate 18 mmol/ 

Magnesium Sulfate 8 meq/Calcium Gluconate 15 meq/ Multivitamins 10 ml/Chromium/ 

Copper/Manganese/ Seleni/Zn 0.5 ml/ Total Parenteral Nutrition/Amino 

Acids/Dextrose/ Fat Emulsion Intravenous 1,400 ml @  58.333 mls/ hr TPN  CONT IV

 Last administered on 3/26/20at 22:00;  Start 3/26/20 at 22:00;  Stop 3/27/20 at

21:59;  Status DC


Albumin Human 500 ml @  125 mls/hr 1X  ONCE IV ;  Start 3/26/20 at 14:15;  Stop 

3/26/20 at 18:14;  Status DC


Sodium Chloride 90 meq/Potassium Chloride 15 meq/ Potassium Phosphate 18 mmol/ 

Magnesium Sulfate 8 meq/Calcium Gluconate 15 meq/ Multivitamins 10 ml/Chromium/ 

Copper/Manganese/ Seleni/Zn 0.5 ml/ Insulin Human Regular 10 unit/ Total 

Parenteral Nutrition/Amino Acids/Dextrose/ Fat Emulsion Intravenous 1,400 ml @  

58.333 mls/ hr TPN  CONT IV  Last administered on 3/27/20at 21:43;  Start 

3/27/20 at 22:00;  Stop 3/28/20 at 21:59;  Status DC


Lidocaine HCl (Buffered Lidocaine 1%) 3 ml STK-MED ONCE .ROUTE ;  Start 3/25/20 

at 10:00;  Stop 3/27/20 at 13:57;  Status DC


Midazolam HCl 100 mg/Sodium Chloride 100 ml @ 7 mls/hr CONT  PRN IV SEE PROTOCOL

Last administered on 4/8/20at 15:35;  Start 3/28/20 at 16:00;  Stop 6/3/20 at 

14:38;  Status DC


Sodium Chloride 90 meq/Potassium Chloride 15 meq/ Potassium Phosphate 18 mmol/ 

Magnesium Sulfate 8 meq/Calcium Gluconate 15 meq/ Multivitamins 10 ml/Chromium/ 

Copper/Manganese/ Seleni/Zn 0.5 ml/ Insulin Human Regular 15 unit/ Total 

Parenteral Nutrition/Amino Acids/Dextrose/ Fat Emulsion Intravenous 1,400 ml @  

58.333 mls/ hr TPN  CONT IV  Last administered on 3/28/20at 20:34;  Start 

3/28/20 at 22:00;  Stop 3/29/20 at 21:59;  Status DC


Info ([facility] Electrolyte Protocol) 1 ea CONT PRN  PRN MC PER PROTOCOL;  Start 

3/29/20 at 13:15


Sodium Chloride 90 meq/Potassium Chloride 15 meq/ Potassium Phosphate 18 mmol/ 

Magnesium Sulfate 8 meq/Calcium Gluconate 15 meq/ Multivitamins 10 ml/Chromium/ 

Copper/Manganese/ Seleni/Zn 0.5 ml/ Insulin Human Regular 15 unit/ Total 

Parenteral Nutrition/Amino Acids/Dextrose/ Fat Emulsion Intravenous 1,400 ml @  

58.333 mls/ hr TPN  CONT IV  Last administered on 3/29/20at 22:05;  Start 

3/29/20 at 22:00;  Stop 3/30/20 at 21:59;  Status DC


Potassium Chloride 15 meq/ Bicarbonate Dialysis Soln w/ out KCl 5,007.5 ml  @ 

1,000 mls/ hr Q5H1M IV  Last administered on 4/1/20at 18:14;  Start 3/29/20 at 

20:00;  Stop 4/2/20 at 13:08;  Status DC


Potassium Chloride 15 meq/ Bicarbonate Dialysis Soln w/ out KCl 5,007.5 ml  @ 

1,000 mls/ hr Q5H1M IV  Last administered on 4/1/20at 18:14;  Start 3/29/20 at 

20:00;  Stop 4/2/20 at 13:08;  Status DC


Potassium Chloride 15 meq/ Bicarbonate Dialysis Soln w/ out KCl 5,007.5 ml  @ 

1,000 mls/ hr Q5H1M IV  Last administered on 4/1/20at 18:14;  Start 3/29/20 at 

20:00;  Stop 4/2/20 at 13:08;  Status DC


Iohexol (Omnipaque 240 Mg/ml) 30 ml 1X  ONCE PO  Last administered on 3/30/20at 

11:30;  Start 3/30/20 at 11:30;  Stop 3/30/20 at 11:33;  Status DC


Info (CONTRAST GIVEN -- Rx MONITORING) 1 each PRN DAILY  PRN MC SEE COMMENTS;  

Start 3/30/20 at 11:45;  Stop 4/1/20 at 11:44;  Status DC


Sodium Chloride 90 meq/Potassium Chloride 15 meq/ Potassium Phosphate 18 mmol/ 

Magnesium Sulfate 8 meq/Calcium Gluconate 15 meq/ Multivitamins 10 ml/Chromium/ 

Copper/Manganese/ Seleni/Zn 0.5 ml/ Insulin Human Regular 15 unit/ Total 

Parenteral Nutrition/Amino Acids/Dextrose/ Fat Emulsion Intravenous 1,400 ml @  

58.333 mls/ hr TPN  CONT IV  Last administered on 3/30/20at 21:47;  Start 

3/30/20 at 22:00;  Stop 3/31/20 at 21:59;  Status DC


Sodium Chloride 90 meq/Potassium Chloride 15 meq/ Potassium Phosphate 18 mmol/ 

Magnesium Sulfate 8 meq/Calcium Gluconate 15 meq/ Multivitamins 10 ml/Chromium/ 

Copper/Manganese/ Seleni/Zn 0.5 ml/ Insulin Human Regular 20 unit/ Total 

Parenteral Nutrition/Amino Acids/Dextrose/ Fat Emulsion Intravenous 1,400 ml @  

58.333 mls/ hr TPN  CONT IV  Last administered on 3/31/20at 21:36;  Start 

3/31/20 at 22:00;  Stop 4/1/20 at 21:59;  Status DC


Alteplase, Recombinant (Cathflo For Central Catheter Clearance) 1 mg 1X  ONCE 

INT CAT  Last administered on 3/31/20at 20:03;  Start 3/31/20 at 19:30;  Stop 

3/31/20 at 19:46;  Status DC


Alteplase, Recombinant (Cathflo For Central Catheter Clearance) 1 mg 1X  ONCE 

INT CAT  Last administered on 3/31/20at 22:05;  Start 3/31/20 at 22:00;  Stop 

3/31/20 at 22:01;  Status DC


Sodium Chloride 90 meq/Potassium Chloride 15 meq/ Potassium Phosphate 18 mmol/ 

Magnesium Sulfate 8 meq/Calcium Gluconate 15 meq/ Multivitamins 10 ml/Chromium/ 

Copper/Manganese/ Seleni/Zn 0.5 ml/ Insulin Human Regular 20 unit/ Total 

Parenteral Nutrition/Amino Acids/Dextrose/ Fat Emulsion Intravenous 1,400 ml @  

58.333 mls/ hr TPN  CONT IV  Last administered on 4/1/20at 21:30;  Start 4/1/20 

at 22:00;  Stop 4/2/20 at 21:59;  Status DC


Dexmedetomidine HCl 400 mcg/ Sodium Chloride 100 ml @ 0 mls/hr CONT  PRN IV 

ANXIETY / AGITATION Last administered on 5/30/20at 12:57;  Start 4/2/20 at 

08:15;  Stop 5/30/20 at 18:31;  Status DC


Sodium Chloride 500 ml @  500 mls/hr 1X PRN  PRN IV ELEVATED BP, SEE COMMENTS;  

Start 4/2/20 at 08:15


Atropine Sulfate (ATROPINE 0.5mg SYRINGE) 0.5 mg PRN Q5MIN  PRN IV SEE COMMENTS;

 Start 4/2/20 at 08:15


Furosemide (Lasix) 20 mg 1X  ONCE IVP  Last administered on 4/2/20at 08:19;  

Start 4/2/20 at 08:15;  Stop 4/2/20 at 08:16;  Status DC


Lidocaine HCl (Buffered Lidocaine 1%) 3 ml STK-MED ONCE .ROUTE ;  Start 4/2/20 

at 08:39;  Stop 4/2/20 at 08:39;  Status DC


Lidocaine HCl (Buffered Lidocaine 1%) 6 ml 1X  ONCE INJ  Last administered on 

4/2/20at 09:05;  Start 4/2/20 at 09:00;  Stop 4/2/20 at 09:06;  Status DC


Sodium Chloride 90 meq/Potassium Chloride 15 meq/ Potassium Phosphate 18 mmol/ 

Magnesium Sulfate 8 meq/Calcium Gluconate 15 meq/ Multivitamins 10 ml/Chromium/ 

Copper/Manganese/ Seleni/Zn 0.5 ml/ Insulin Human Regular 20 unit/ Total 

Parenteral Nutrition/Amino Acids/Dextrose/ Fat Emulsion Intravenous 1,400 ml @  

58.333 mls/ hr TPN  CONT IV  Last administered on 4/2/20at 22:45;  Start 4/2/20 

at 22:00;  Stop 4/3/20 at 21:59;  Status DC


Sodium Chloride 1,000 ml @  1,000 mls/hr Q1H PRN IV hypotension;  Start 4/3/20 

at 07:30;  Stop 4/3/20 at 13:29;  Status DC


Albumin Human 200 ml @  200 mls/hr 1X PRN  PRN IV Hypotension Last administered 

on 4/3/20at 09:36;  Start 4/3/20 at 07:30;  Stop 4/3/20 at 13:29;  Status DC


Sodium Chloride (Normal Saline Flush) 10 ml 1X PRN  PRN IV AP catheter pack;  

Start 4/3/20 at 07:30;  Stop 4/3/20 at 21:29;  Status DC


Sodium Chloride (Normal Saline Flush) 10 ml 1X PRN  PRN IV  catheter pack;  

Start 4/3/20 at 07:30;  Stop 4/4/20 at 07:29;  Status DC


Sodium Chloride 1,000 ml @  400 mls/hr Q2H30M PRN IV PATENCY;  Start 4/3/20 at 

07:30;  Stop 4/3/20 at 19:29;  Status DC


Info (PHARMACY MONITORING -- do not chart) 1 each PRN DAILY  PRN MC SEE 

COMMENTS;  Start 4/3/20 at 07:30;  Stop 4/3/20 at 13:02;  Status DC


Info (PHARMACY MONITORING -- do not chart) 1 each PRN DAILY  PRN MC SEE 

COMMENTS;  Start 4/3/20 at 07:30;  Stop 4/5/20 at 12:45;  Status DC


Sodium Chloride 90 meq/Potassium Chloride 15 meq/ Potassium Phosphate 10 mmol/ 

Magnesium Sulfate 8 meq/Calcium Gluconate 15 meq/ Multivitamins 10 ml/Chromium/ 

Copper/Manganese/ Seleni/Zn 0.5 ml/ Insulin Human Regular 25 unit/ Total 

Parenteral Nutrition/Amino Acids/Dextrose/ Fat Emulsion Intravenous 1,400 ml @  

58.333 mls/ hr TPN  CONT IV  Last administered on 4/3/20at 22:19;  Start 4/3/20 

at 22:00;  Stop 4/4/20 at 21:59;  Status DC


Heparin Sodium (Porcine) (Heparin Sodium) 5,000 unit Q12HR SQ  Last administered

on 4/26/20at 08:59;  Start 4/3/20 at 21:00;  Stop 4/26/20 at 10:05;  Status DC


Ondansetron HCl (Zofran) 4 mg PRN Q6HRS  PRN IV NAUSEA/VOMITING;  Start 4/6/20 

at 07:00;  Stop 4/7/20 at 06:59;  Status DC


Fentanyl Citrate (Fentanyl 2ml Vial) 25 mcg PRN Q5MIN  PRN IV MILD PAIN 1-3;  

Start 4/6/20 at 07:00;  Stop 4/7/20 at 06:59;  Status DC


Fentanyl Citrate (Fentanyl 2ml Vial) 50 mcg PRN Q5MIN  PRN IV MODERATE TO SEVERE

PAIN;  Start 4/6/20 at 07:00;  Stop 4/7/20 at 06:59;  Status DC


Ringer's Solution 1,000 ml @  30 mls/hr Q24H IV ;  Start 4/6/20 at 07:00;  Stop 

4/6/20 at 18:59;  Status DC


Lidocaine HCl (Xylocaine-Mpf 1% 2ml Vial) 2 ml PRN 1X  PRN ID PRIOR TO IV START;

 Start 4/6/20 at 07:00;  Stop 4/7/20 at 06:59;  Status DC


Prochlorperazine Edisylate (Compazine) 5 mg PACU PRN  PRN IV NAUSEA, MRX1;  

Start 4/6/20 at 07:00;  Stop 4/7/20 at 06:59;  Status DC


Sodium Chloride 1,000 ml @  1,000 mls/hr Q1H PRN IV hypotension;  Start 4/4/20 

at 09:10;  Stop 4/4/20 at 15:09;  Status DC


Albumin Human 200 ml @  200 mls/hr 1X PRN  PRN IV Hypotension Last administered 

on 4/4/20at 10:10;  Start 4/4/20 at 09:15;  Stop 4/4/20 at 15:14;  Status DC


Sodium Chloride 1,000 ml @  400 mls/hr Q2H30M PRN IV PATENCY;  Start 4/4/20 at 

09:10;  Stop 4/4/20 at 21:09;  Status DC


Info (PHARMACY MONITORING -- do not chart) 1 each PRN DAILY  PRN MC SEE 

COMMENTS;  Start 4/4/20 at 09:15;  Stop 4/5/20 at 12:45;  Status DC


Info (PHARMACY MONITORING -- do not chart) 1 each PRN DAILY  PRN MC SEE 

COMMENTS;  Start 4/4/20 at 09:15;  Stop 4/5/20 at 12:45;  Status DC


Sodium Chloride 90 meq/Potassium Chloride 15 meq/ Potassium Phosphate 10 mmol/ 

Magnesium Sulfate 8 meq/Calcium Gluconate 15 meq/ Multivitamins 10 ml/Chromium/ 

Copper/Manganese/ Seleni/Zn 0.5 ml/ Insulin Human Regular 25 unit/ Total Lisa

ral Nutrition/Amino Acids/Dextrose/ Fat Emulsion Intravenous 1,400 ml @  58.333 

mls/ hr TPN  CONT IV  Last administered on 4/4/20at 22:10;  Start 4/4/20 at 

22:00;  Stop 4/5/20 at 21:59;  Status DC


Magnesium Sulfate 50 ml @ 25 mls/hr PRN DAILY  PRN IV for Mag < 1.7 on am labs 

Last administered on 6/18/20at 10:57;  Start 4/5/20 at 09:15


Sodium Chloride 90 meq/Potassium Chloride 15 meq/ Potassium Phosphate 10 mmol/ 

Magnesium Sulfate 8 meq/Calcium Gluconate 15 meq/ Multivitamins 10 ml/Chromium/ 

Copper/Manganese/ Seleni/Zn 0.5 ml/ Insulin Human Regular 25 unit/ Total 

Parenteral Nutrition/Amino Acids/Dextrose/ Fat Emulsion Intravenous 1,400 ml @  

58.333 mls/ hr TPN  CONT IV  Last administered on 4/5/20at 21:20;  Start 4/5/20 

at 22:00;  Stop 4/6/20 at 21:59;  Status DC


Sodium Chloride 1,000 ml @  1,000 mls/hr Q1H PRN IV hypotension;  Start 4/5/20 

at 12:23;  Stop 4/5/20 at 18:22;  Status DC


Albumin Human 200 ml @  200 mls/hr 1X  ONCE IV  Last administered on 4/5/20at 

13:34;  Start 4/5/20 at 12:30;  Stop 4/5/20 at 13:29;  Status DC


Diphenhydramine HCl (Benadryl) 25 mg 1X PRN  PRN IV ITCHING;  Start 4/5/20 at 

12:30;  Stop 4/6/20 at 12:29;  Status DC


Diphenhydramine HCl (Benadryl) 25 mg 1X PRN  PRN IV ITCHING;  Start 4/5/20 at 

12:30;  Stop 4/6/20 at 12:29;  Status DC


Info (PHARMACY MONITORING -- do not chart) 1 each PRN DAILY  PRN MC SEE 

COMMENTS;  Start 4/5/20 at 12:30;  Status Cancel


Bupivacaine HCl/ Epinephrine Bitart (Sensorcain-Epi 0.5%-1:118686 Mpf) 30 ml 

STK-MED ONCE .ROUTE  Last administered on 4/6/20at 11:44;  Start 4/6/20 at 

11:00;  Stop 4/6/20 at 11:01;  Status DC


Cellulose (Surgicel Fibrillar 1x2) 1 each STK-MED ONCE .ROUTE ;  Start 4/6/20 at

11:00;  Stop 4/6/20 at 11:01;  Status DC


Sodium Chloride 90 meq/Potassium Chloride 15 meq/ Potassium Phosphate 10 mmol/ 

Magnesium Sulfate 12 meq/Calcium Gluconate 15 meq/ Multivitamins 10 ml/Chromium/

Copper/Manganese/ Seleni/Zn 0.5 ml/ Insulin Human Regular 25 unit/ Total 

Parenteral Nutrition/Amino Acids/Dextrose/ Fat Emulsion Intravenous 1,400 ml @  

58.333 mls/ hr TPN  CONT IV  Last administered on 4/6/20at 22:24;  Start 4/6/20 

at 22:00;  Stop 4/7/20 at 21:59;  Status DC


Propofol 20 ml @ As Directed STK-MED ONCE IV ;  Start 4/6/20 at 11:07;  Stop 

4/6/20 at 11:07;  Status DC


Cellulose (Surgicel Hemostat 4x8) 1 each STK-MED ONCE .ROUTE  Last administered 

on 4/6/20at 11:44;  Start 4/6/20 at 11:55;  Stop 4/6/20 at 11:56;  Status DC


Sevoflurane (Ultane) 60 ml STK-MED ONCE IH ;  Start 4/6/20 at 12:46;  Stop 

4/6/20 at 12:46;  Status DC


Sodium Chloride 1,000 ml @  1,000 mls/hr Q1H PRN IV hypotension;  Start 4/6/20 

at 13:51;  Stop 4/6/20 at 19:50;  Status DC


Albumin Human 200 ml @  200 mls/hr 1X PRN  PRN IV Hypotension Last administered 

on 4/6/20at 14:51;  Start 4/6/20 at 14:00;  Stop 4/6/20 at 19:59;  Status DC


Diphenhydramine HCl (Benadryl) 25 mg 1X PRN  PRN IV ITCHING;  Start 4/6/20 at 

14:00;  Stop 4/7/20 at 13:59;  Status DC


Diphenhydramine HCl (Benadryl) 25 mg 1X PRN  PRN IV ITCHING;  Start 4/6/20 at 

14:00;  Stop 4/7/20 at 13:59;  Status DC


Sodium Chloride 1,000 ml @  400 mls/hr Q2H30M PRN IV PATENCY;  Start 4/6/20 at 

13:51;  Stop 4/7/20 at 01:50;  Status DC


Info (PHARMACY MONITORING -- do not chart) 1 each PRN DAILY  PRN MC SEE 

COMMENTS;  Start 4/6/20 at 14:00;  Stop 4/9/20 at 08:16;  Status DC


Heparin Sodium (Porcine) (Hep Lock Adult) 500 unit STK-MED ONCE IVP ;  Start 

4/7/20 at 09:29;  Stop 4/7/20 at 09:30;  Status DC


Sodium Chloride 1,000 ml @  1,000 mls/hr Q1H PRN IV hypotension;  Start 4/7/20 

at 10:43;  Stop 4/7/20 at 16:42;  Status DC


Sodium Chloride 1,000 ml @  400 mls/hr Q2H30M PRN IV PATENCY;  Start 4/7/20 at 

10:43;  Stop 4/7/20 at 22:42;  Status DC


Info (PHARMACY MONITORING -- do not chart) 1 each PRN DAILY  PRN MC SEE 

COMMENTS;  Start 4/7/20 at 10:45;  Status UNV


Info (PHARMACY MONITORING -- do not chart) 1 each PRN DAILY  PRN MC SEE 

COMMENTS;  Start 4/7/20 at 10:45;  Status UNV


Sodium Chloride 90 meq/Potassium Chloride 15 meq/ Magnesium Sulfate 12 

meq/Calcium Gluconate 15 meq/ Multivitamins 10 ml/Chromium/ Copper/Manganese/ 

Seleni/Zn 0.5 ml/ Insulin Human Regular 25 unit/ Total Parenteral 

Nutrition/Amino Acids/Dextrose/ Fat Emulsion Intravenous 1,400 ml @  58.333 mls/

hr TPN  CONT IV  Last administered on 4/7/20at 22:13;  Start 4/7/20 at 22:00;  

Stop 4/8/20 at 21:59;  Status DC


Sodium Chloride 1,000 ml @  1,000 mls/hr Q1H PRN IV hypotension;  Start 4/8/20 

at 07:50;  Stop 4/8/20 at 13:49;  Status DC


Albumin Human 200 ml @  200 mls/hr 1X  ONCE IV ;  Start 4/8/20 at 08:00;  Stop 

4/8/20 at 08:53;  Status DC


Diphenhydramine HCl (Benadryl) 25 mg 1X PRN  PRN IV ITCHING;  Start 4/8/20 at 

08:00;  Stop 4/9/20 at 07:59;  Status DC


Diphenhydramine HCl (Benadryl) 25 mg 1X PRN  PRN IV ITCHING;  Start 4/8/20 at 0

8:00;  Stop 4/9/20 at 07:59;  Status DC


Info (PHARMACY MONITORING -- do not chart) 1 each PRN DAILY  PRN MC SEE 

COMMENTS;  Start 4/8/20 at 08:00;  Stop 4/9/20 at 08:16;  Status DC


Albumin Human 50 ml @ 50 mls/hr 1X  ONCE IV ;  Start 4/8/20 at 08:53;  Stop 

4/8/20 at 08:56;  Status DC


Albumin Human 200 ml @  50 mls/hr PRN 1X  PRN IV HYPOTENSION Last administered 

on 4/14/20at 11:54;  Start 4/8/20 at 09:00;  Stop 5/21/20 at 11:14;  Status DC


Meropenem 500 mg/ Sodium Chloride 50 ml @  100 mls/hr Q12H IV  Last administered

on 4/28/20at 10:45;  Start 4/8/20 at 10:00;  Stop 4/28/20 at 12:37;  Status DC


Sodium Chloride 90 meq/Magnesium Sulfate 12 meq/ Calcium Gluconate 15 meq/ 

Multivitamins 10 ml/Chromium/ Copper/Manganese/ Seleni/Zn 0.5 ml/ Insulin Human 

Regular 25 unit/ Total Parenteral Nutrition/Amino Acids/Dextrose/ Fat Emulsion 

Intravenous 1,400 ml @  58.333 mls/ hr TPN  CONT IV  Last administered on 

4/8/20at 21:41;  Start 4/8/20 at 22:00;  Stop 4/9/20 at 21:59;  Status DC


Sodium Chloride 1,000 ml @  1,000 mls/hr Q1H PRN IV hypotension;  Start 4/9/20 

at 07:58;  Stop 4/9/20 at 13:57;  Status DC


Albumin Human 200 ml @  200 mls/hr 1X PRN  PRN IV Hypotension Last administered 

on 4/9/20at 09:30;  Start 4/9/20 at 08:00;  Stop 4/9/20 at 13:59;  Status DC


Sodium Chloride 1,000 ml @  400 mls/hr Q2H30M PRN IV PATENCY;  Start 4/9/20 at 

07:58;  Stop 4/9/20 at 19:57;  Status DC


Info (PHARMACY MONITORING -- do not chart) 1 each PRN DAILY  PRN MC SEE 

COMMENTS;  Start 4/9/20 at 08:00;  Status Cancel


Info (PHARMACY MONITORING -- do not chart) 1 each PRN DAILY  PRN MC SEE 

COMMENTS;  Start 4/9/20 at 08:15;  Status UNV


Sodium Chloride 90 meq/Potassium Phosphate 5 mmol/ Magnesium Sulfate 12 

meq/Calcium Gluconate 15 meq/ Multivitamins 10 ml/Chromium/ Copper/Manganese/ 

Seleni/Zn 0.5 ml/ Insulin Human Regular 30 unit/ Total Parenteral 

Nutrition/Amino Acids/Dextrose/ Fat Emulsion Intravenous 1,400 ml @  58.333 mls/

hr TPN  CONT IV  Last administered on 4/9/20at 22:08;  Start 4/9/20 at 22:00;  

Stop 4/10/20 at 21:59;  Status DC


Linezolid/Dextrose 300 ml @  300 mls/hr Q12HR IV  Last administered on 4/20/20at

20:40;  Start 4/10/20 at 11:00;  Stop 4/21/20 at 08:10;  Status DC


Sodium Chloride 90 meq/Potassium Phosphate 15 mmol/ Magnesium Sulfate 12 

meq/Calcium Gluconate 15 meq/ Multivitamins 10 ml/Chromium/ Copper/Manganese/ 

Seleni/Zn 0.5 ml/ Insulin Human Regular 30 unit/ Total Parenteral 

Nutrition/Amino Acids/Dextrose/ Fat Emulsion Intravenous 1,400 ml @  58.333 mls/

hr TPN  CONT IV  Last administered on 4/10/20at 21:49;  Start 4/10/20 at 22:00; 

Stop 4/11/20 at 21:59;  Status DC


Sodium Chloride 90 meq/Potassium Phosphate 15 mmol/ Magnesium Sulfate 12 

meq/Calcium Gluconate 15 meq/ Multivitamins 10 ml/Chromium/ Copper/Manganese/ 

Seleni/Zn 0.5 ml/ Insulin Human Regular 40 unit/ Total Parenteral Nutri

tion/Amino Acids/Dextrose/ Fat Emulsion Intravenous 1,400 ml @  58.333 mls/ hr 

TPN  CONT IV  Last administered on 4/11/20at 21:21;  Start 4/11/20 at 22:00;  

Stop 4/12/20 at 21:59;  Status DC


Sodium Chloride 1,000 ml @  1,000 mls/hr Q1H PRN IV hypotension;  Start 4/11/20 

at 13:26;  Stop 4/11/20 at 19:25;  Status DC


Albumin Human 200 ml @  200 mls/hr 1X PRN  PRN IV Hypotension Last administered 

on 4/11/20at 15:00;  Start 4/11/20 at 13:30;  Stop 4/11/20 at 19:29;  Status DC


Sodium Chloride (Normal Saline Flush) 10 ml 1X PRN  PRN IV AP catheter pack;  

Start 4/11/20 at 13:30;  Stop 4/12/20 at 13:29;  Status DC


Sodium Chloride (Normal Saline Flush) 10 ml 1X PRN  PRN IV  catheter pack;  

Start 4/11/20 at 13:30;  Stop 4/12/20 at 13:29;  Status DC


Sodium Chloride 1,000 ml @  400 mls/hr Q2H30M PRN IV PATENCY;  Start 4/11/20 at 

13:26;  Stop 4/12/20 at 01:25;  Status DC


Info (PHARMACY MONITORING -- do not chart) 1 each PRN DAILY  PRN MC SEE 

COMMENTS;  Start 4/11/20 at 13:30;  Stop 4/11/20 at 13:33;  Status DC


Info (PHARMACY MONITORING -- do not chart) 1 each PRN DAILY  PRN MC SEE 

COMMENTS;  Start 4/11/20 at 13:30;  Stop 4/11/20 at 13:34;  Status DC


Sodium Chloride 90 meq/Potassium Phosphate 19 mmol/ Magnesium Sulfate 12 

meq/Calcium Gluconate 15 meq/ Multivitamins 10 ml/Chromium/ Copper/Manganese/ 

Seleni/Zn 0.5 ml/ Insulin Human Regular 40 unit/ Total Parenteral 

Nutrition/Amino Acids/Dextrose/ Fat Emulsion Intravenous 1,400 ml @  58.333 mls/

hr TPN  CONT IV  Last administered on 4/12/20at 21:54;  Start 4/12/20 at 22:00; 

Stop 4/13/20 at 21:59;  Status DC


Sodium Chloride 1,000 ml @  1,000 mls/hr Q1H PRN IV hypotension;  Start 4/13/20 

at 09:35;  Stop 4/13/20 at 15:34;  Status DC


Albumin Human 200 ml @  200 mls/hr 1X PRN  PRN IV Hypotension;  Start 4/13/20 at

09:45;  Stop 4/13/20 at 15:44;  Status DC


Diphenhydramine HCl (Benadryl) 25 mg 1X PRN  PRN IV ITCHING;  Start 4/13/20 at 

09:45;  Stop 4/14/20 at 09:44;  Status DC


Diphenhydramine HCl (Benadryl) 25 mg 1X PRN  PRN IV ITCHING;  Start 4/13/20 at 

09:45;  Stop 4/14/20 at 09:44;  Status DC


Sodium Chloride 1,000 ml @  400 mls/hr Q2H30M PRN IV PATENCY;  Start 4/13/20 at 

09:35;  Stop 4/13/20 at 21:34;  Status DC


Info (PHARMACY MONITORING -- do not chart) 1 each PRN DAILY  PRN MC SEE 

COMMENTS;  Start 4/13/20 at 09:45;  Status Cancel


Sodium Chloride 100 meq/Potassium Phosphate 19 mmol/ Magnesium Sulfate 12 

meq/Calcium Gluconate 15 meq/ Multivitamins 10 ml/Chromium/ Copper/Manganese/ 

Seleni/Zn 0.5 ml/ Insulin Human Regular 40 unit/ Potassium Chloride 20 meq/ 

Total Parenteral Nutrition/Amino Acids/Dextrose/ Fat Emulsion Intravenous 1,400 

ml @  58.333 mls/ hr TPN  CONT IV  Last administered on 4/13/20at 22:02;  Start 

4/13/20 at 22:00;  Stop 4/14/20 at 21:59;  Status DC


Furosemide (Lasix) 40 mg 1X  ONCE IVP  Last administered on 4/13/20at 14:39;  

Start 4/13/20 at 14:30;  Stop 4/13/20 at 14:31;  Status DC


Metronidazole 100 ml @  100 mls/hr Q8HRS IV  Last administered on 4/21/20at 

06:04;  Start 4/14/20 at 10:00;  Stop 4/21/20 at 08:10;  Status DC


Sodium Chloride 1,000 ml @  1,000 mls/hr Q1H PRN IV hypotension;  Start 4/14/20 

at 08:00;  Stop 4/14/20 at 13:59;  Status DC


Albumin Human 200 ml @  200 mls/hr 1X PRN  PRN IV Hypotension;  Start 4/14/20 at

08:00;  Stop 4/14/20 at 13:59;  Status DC


Sodium Chloride 1,000 ml @  400 mls/hr Q2H30M PRN IV PATENCY;  Start 4/14/20 at 

08:00;  Stop 4/14/20 at 19:59;  Status DC


Info (PHARMACY MONITORING -- do not chart) 1 each PRN DAILY  PRN MC SEE 

COMMENTS;  Start 4/14/20 at 11:30;  Status UNV


Info (PHARMACY MONITORING -- do not chart) 1 each PRN DAILY  PRN MC SEE 

COMMENTS;  Start 4/14/20 at 11:30;  Stop 4/16/20 at 12:13;  Status DC


Sodium Chloride 100 meq/Potassium Phosphate 19 mmol/ Magnesium Sulfate 12 

meq/Calcium Gluconate 15 meq/ Multivitamins 10 ml/Chromium/ Copper/Manganese/ 

Seleni/Zn 0.5 ml/ Insulin Human Regular 40 unit/ Potassium Chloride 20 meq/ 

Total Parenteral Nutrition/Amino Acids/Dextrose/ Fat Emulsion Intravenous 1,400 

ml @  58.333 mls/ hr TPN  CONT IV  Last administered on 4/14/20at 21:52;  Start 

4/14/20 at 22:00;  Stop 4/15/20 at 21:59;  Status DC


Sodium Chloride (Normal Saline Flush) 10 ml QSHIFT  PRN IV AFTER MEDS AND BLOOD 

DRAWS;  Start 4/14/20 at 15:00;  Stop 5/12/20 at 11:27;  Status DC


Sodium Chloride (Normal Saline Flush) 10 ml PRN Q5MIN  PRN IV AFTER MEDS AND 

BLOOD DRAWS;  Start 4/14/20 at 15:00


Sodium Chloride (Normal Saline Flush) 20 ml PRN Q5MIN  PRN IV AFTER MEDS AND 

BLOOD DRAWS;  Start 4/14/20 at 15:00


Sodium Chloride 100 meq/Potassium Phosphate 19 mmol/ Magnesium Sulfate 12 

meq/Calcium Gluconate 15 meq/ Multivitamins 10 ml/Chromium/ Copper/Manganese/ 

Seleni/Zn 0.5 ml/ Insulin Human Regular 40 unit/ Potassium Chloride 20 meq/ 

Total Parenteral Nutrition/Amino Acids/Dextrose/ Fat Emulsion Intravenous 1,400 

ml @  58.333 mls/ hr TPN  CONT IV  Last administered on 4/15/20at 21:20;  Start 

4/15/20 at 22:00;  Stop 4/16/20 at 21:59;  Status DC


Lidocaine HCl (Buffered Lidocaine 1%) 3 ml STK-MED ONCE .ROUTE ;  Start 4/15/20 

at 13:16;  Stop 4/15/20 at 13:16;  Status DC


Lidocaine HCl (Buffered Lidocaine 1%) 6 ml 1X  ONCE INJ  Last administered on 

4/15/20at 13:45;  Start 4/15/20 at 13:30;  Stop 4/15/20 at 13:31;  Status DC


Albumin Human 100 ml @  100 mls/hr 1X  ONCE IV  Last administered on 4/15/20at 

15:41;  Start 4/15/20 at 15:00;  Stop 4/15/20 at 15:59;  Status DC


Albumin Human 50 ml @ 50 mls/hr 1X  ONCE IV  Last administered on 4/15/20at 

15:00;  Start 4/15/20 at 15:00;  Stop 4/15/20 at 15:59;  Status DC


Info (PHARMACY MONITORING -- do not chart) 1 each PRN DAILY  PRN MC SEE 

COMMENTS;  Start 4/16/20 at 11:30;  Status Cancel


Info (PHARMACY MONITORING -- do not chart) 1 each PRN DAILY  PRN MC SEE 

COMMENTS;  Start 4/16/20 at 11:30;  Status UNV


Sodium Chloride 100 meq/Potassium Phosphate 10 mmol/ Magnesium Sulfate 12 

meq/Calcium Gluconate 15 meq/ Multivitamins 10 ml/Chromium/ Copper/Manganese/ 

Seleni/Zn 0.5 ml/ Insulin Human Regular 35 unit/ Potassium Chloride 20 meq/ 

Total Parenteral Nutrition/Amino Acids/Dextrose/ Fat Emulsion Intravenous 1,400 

ml @  58.333 mls/ hr TPN  CONT IV  Last administered on 4/16/20at 22:10;  Start 

4/16/20 at 22:00;  Stop 4/17/20 at 21:59;  Status DC


Sodium Chloride 100 meq/Potassium Phosphate 5 mmol/ Magnesium Sulfate 12 

meq/Calcium Gluconate 15 meq/ Multivitamins 10 ml/Chromium/ Copper/Manganese/ 

Seleni/Zn 0.5 ml/ Insulin Human Regular 35 unit/ Potassium Chloride 20 meq/ 

Total Parenteral Nutrition/Amino Acids/Dextrose/ Fat Emulsion Intravenous 1,400 

ml @  58.333 mls/ hr TPN  CONT IV  Last administered on 4/17/20at 22:59;  Start 

4/17/20 at 22:00;  Stop 4/18/20 at 21:59;  Status DC


Sodium Chloride 1,000 ml @  1,000 mls/hr Q1H PRN IV hypotension;  Start 4/18/20 

at 08:27;  Stop 4/18/20 at 14:26;  Status DC


Albumin Human 200 ml @  200 mls/hr 1X PRN  PRN IV Hypotension Last administered 

on 4/18/20at 09:18;  Start 4/18/20 at 08:30;  Stop 4/18/20 at 14:29;  Status DC


Sodium Chloride 1,000 ml @  400 mls/hr Q2H30M PRN IV PATENCY;  Start 4/18/20 at 

08:27;  Stop 4/18/20 at 20:26;  Status DC


Info (PHARMACY MONITORING -- do not chart) 1 each PRN DAILY  PRN MC SEE 

COMMENTS;  Start 4/18/20 at 08:30;  Status Cancel


Info (PHARMACY MONITORING -- do not chart) 1 each PRN DAILY  PRN MC SEE 

COMMENTS;  Start 4/18/20 at 08:30;  Stop 4/26/20 at 13:10;  Status DC


Sodium Chloride 100 meq/Potassium Chloride 40 meq/ Magnesium Sulfate 15 

meq/Calcium Gluconate 15 meq/ Multivitamins 10 ml/Chromium/ Copper/Manganese/ 

Seleni/Zn 0.5 ml/ Insulin Human Regular 35 unit/ Total Parenteral 

Nutrition/Amino Acids/Dextrose/ Fat Emulsion Intravenous 1,400 ml @  58.333 mls/

hr TPN  CONT IV  Last administered on 4/18/20at 22:00;  Start 4/18/20 at 22:00; 

Stop 4/19/20 at 21:59;  Status DC


Potassium Chloride/Water 100 ml @  100 mls/hr 1X  ONCE IV  Last administered on 

4/18/20at 17:28;  Start 4/18/20 at 14:45;  Stop 4/18/20 at 15:44;  Status DC


Sodium Chloride 100 meq/Potassium Chloride 40 meq/ Magnesium Sulfate 15 

meq/Calcium Gluconate 15 meq/ Multivitamins 10 ml/Chromium/ Copper/Manganese/ 

Seleni/Zn 0.5 ml/ Insulin Human Regular 35 unit/ Total Parenteral Nutri

tion/Amino Acids/Dextrose/ Fat Emulsion Intravenous 1,400 ml @  58.333 mls/ hr 

TPN  CONT IV  Last administered on 4/19/20at 22:46;  Start 4/19/20 at 22:00;  

Stop 4/20/20 at 21:59;  Status DC


Sodium Chloride 100 meq/Potassium Chloride 40 meq/ Magnesium Sulfate 20 

meq/Calcium Gluconate 15 meq/ Multivitamins 10 ml/Chromium/ Copper/Manganese/ 

Seleni/Zn 0.5 ml/ Insulin Human Regular 35 unit/ Total Parenteral 

Nutrition/Amino Acids/Dextrose/ Fat Emulsion Intravenous 1,400 ml @  58.333 mls/

hr TPN  CONT IV  Last administered on 4/20/20at 22:31;  Start 4/20/20 at 22:00; 

Stop 4/21/20 at 21:59;  Status DC


Fentanyl Citrate (Fentanyl 2ml Vial) 50 mcg PRN Q2HR  PRN IVP PAIN Last 

administered on 4/27/20at 13:32;  Start 4/20/20 at 21:00;  Stop 4/28/20 at 

12:53;  Status DC


Fentanyl Citrate (Fentanyl 2ml Vial) 25 mcg PRN Q2HR  PRN IVP PAIN;  Start 

4/20/20 at 21:00;  Stop 4/28/20 at 12:54;  Status DC


Enoxaparin Sodium (Lovenox 100mg Syringe) 100 mg Q12HR SQ ;  Start 4/21/20 at 

21:00;  Status UNV


Amino Acids/ Glycerin/ Electrolytes 1,000 ml @  75 mls/hr [id number] IV ;  Start 

4/20/20 at 21:15;  Status UNV


Sodium Chloride 1,000 ml @  1,000 mls/hr Q1H PRN IV hypotension;  Start 4/21/20 

at 07:56;  Stop 4/21/20 at 13:55;  Status DC


Albumin Human 200 ml @  200 mls/hr 1X PRN  PRN IV Hypotension Last administered 

on 4/21/20at 08:40;  Start 4/21/20 at 08:00;  Stop 4/21/20 at 13:59;  Status DC


Sodium Chloride 1,000 ml @  400 mls/hr Q2H30M PRN IV PATENCY;  Start 4/21/20 at 

07:56;  Stop 4/21/20 at 19:55;  Status DC


Info (PHARMACY MONITORING -- do not chart) 1 each PRN DAILY  PRN MC SEE 

COMMENTS;  Start 4/21/20 at 08:00;  Status UNV


Info (PHARMACY MONITORING -- do not chart) 1 each PRN DAILY  PRN MC SEE 

COMMENTS;  Start 4/21/20 at 08:00;  Status UNV


Daptomycin 430 mg/ Sodium Chloride 50 ml @  100 mls/hr Q24H IV  Last 

administered on 4/21/20at 12:35;  Start 4/21/20 at 09:00;  Stop 4/21/20 at 

12:49;  Status DC


Sodium Chloride 100 meq/Potassium Chloride 40 meq/ Magnesium Sulfate 20 

meq/Calcium Gluconate 15 meq/ Multivitamins 10 ml/Chromium/ Copper/Manganese/ 

Seleni/Zn 0.5 ml/ Insulin Human Regular 35 unit/ Total Parenteral 

Nutrition/Amino Acids/Dextrose/ Fat Emulsion Intravenous 1,400 ml @  58.333 mls/

hr TPN  CONT IV  Last administered on 4/21/20at 21:26;  Start 4/21/20 at 22:00; 

Stop 4/22/20 at 21:59;  Status DC


Daptomycin 430 mg/ Sodium Chloride 50 ml @  100 mls/hr Q48H IV ;  Start 4/23/20 

at 09:00;  Stop 4/22/20 at 11:55;  Status DC


Sodium Chloride 100 meq/Potassium Chloride 40 meq/ Magnesium Sulfate 20 

meq/Calcium Gluconate 15 meq/ Multivitamins 10 ml/Chromium/ Copper/Manganese/ 

Seleni/Zn 0.5 ml/ Insulin Human Regular 35 unit/ Total Parenteral 

Nutrition/Amino Acids/Dextrose/ Fat Emulsion Intravenous 1,400 ml @  58.333 mls/

hr TPN  CONT IV  Last administered on 4/22/20at 22:27;  Start 4/22/20 at 22:00; 

Stop 4/23/20 at 21:59;  Status DC


Daptomycin 430 mg/ Sodium Chloride 50 ml @  100 mls/hr Q24H IV  Last 

administered on 4/24/20at 15:07;  Start 4/22/20 at 13:00;  Stop 4/25/20 at 

13:15;  Status DC


Sodium Chloride 100 meq/Potassium Chloride 40 meq/ Magnesium Sulfate 20 

meq/Calcium Gluconate 10 meq/ Multivitamins 10 ml/Chromium/ Copper/Manganese/ 

Seleni/Zn 0.5 ml/ Insulin Human Regular 35 unit/ Total Parenteral 

Nutrition/Amino Acids/Dextrose/ Fat Emulsion Intravenous 1,400 ml @  58.333 mls/

hr TPN  CONT IV  Last administered on 4/24/20at 00:06;  Start 4/23/20 at 22:00; 

Stop 4/24/20 at 21:59;  Status DC


Alteplase, Recombinant (Cathflo For Central Catheter Clearance) 1 mg 1X  ONCE 

INT CAT  Last administered on 4/24/20at 11:44;  Start 4/24/20 at 10:45;  Stop 

4/24/20 at 10:46;  Status DC


Ondansetron HCl (Zofran) 4 mg PRN Q6HRS  PRN IV NAUSEA/VOMITING;  Start 4/27/20 

at 07:00;  Stop 4/28/20 at 06:59;  Status DC


Fentanyl Citrate (Fentanyl 2ml Vial) 25 mcg PRN Q5MIN  PRN IV MILD PAIN 1-3;  

Start 4/27/20 at 07:00;  Stop 4/28/20 at 06:59;  Status DC


Fentanyl Citrate (Fentanyl 2ml Vial) 50 mcg PRN Q5MIN  PRN IV MODERATE TO SEVERE

PAIN Last administered on 4/27/20at 10:17;  Start 4/27/20 at 07:00;  Stop 

4/28/20 at 06:59;  Status DC


Ringer's Solution 1,000 ml @  30 mls/hr Q24H IV ;  Start 4/27/20 at 07:00;  Stop

4/27/20 at 18:59;  Status DC


Lidocaine HCl (Xylocaine-Mpf 1% 2ml Vial) 2 ml PRN 1X  PRN ID PRIOR TO IV START;

 Start 4/27/20 at 07:00;  Stop 4/28/20 at 06:59;  Status DC


Prochlorperazine Edisylate (Compazine) 5 mg PACU PRN  PRN IV NAUSEA, MRX1;  

Start 4/27/20 at 07:00;  Stop 4/28/20 at 06:59;  Status DC


Sodium Acetate 50 meq/Potassium Acetate 55 meq/ Magnesium Sulfate 20 meq/Calcium

Gluconate 10 meq/ Multivitamins 10 ml/Chromium/ Copper/Manganese/ Seleni/Zn 0.5 

ml/ Insulin Human Regular 35 unit/ Total Parenteral Nutrition/Amino 

Acids/Dextrose/ Fat Emulsion Intravenous 1,400 ml @  58.333 mls/ hr TPN  CONT IV

;  Start 4/24/20 at 22:00;  Stop 4/24/20 at 14:15;  Status DC


Sodium Acetate 50 meq/Potassium Acetate 55 meq/ Magnesium Sulfate 20 meq/Calcium

Gluconate 10 meq/ Multivitamins 10 ml/Chromium/ Copper/Manganese/ Seleni/Zn 0.5 

ml/ Insulin Human Regular 35 unit/ Total Parenteral Nutrition/Amino 

Acids/Dextrose/ Fat Emulsion Intravenous 1,800 ml @  75 mls/hr TPN  CONT IV  

Last administered on 4/24/20at 22:38;  Start 4/24/20 at 22:00;  Stop 4/25/20 at 

21:59;  Status DC


Sodium Chloride 1,000 ml @  1,000 mls/hr Q1H PRN IV hypotension;  Start 4/24/20 

at 15:31;  Stop 4/24/20 at 21:30;  Status DC


Diphenhydramine HCl (Benadryl) 25 mg 1X PRN  PRN IV ITCHING;  Start 4/24/20 at 

15:45;  Stop 4/25/20 at 15:44;  Status DC


Diphenhydramine HCl (Benadryl) 25 mg 1X PRN  PRN IV ITCHING;  Start 4/24/20 at 

15:45;  Stop 4/25/20 at 15:44;  Status DC


Sodium Chloride 1,000 ml @  400 mls/hr Q2H30M PRN IV PATENCY;  Start 4/24/20 at 

15:31;  Stop 4/25/20 at 03:30;  Status DC


Info (PHARMACY MONITORING -- do not chart) 1 each PRN DAILY  PRN MC SEE 

COMMENTS;  Start 4/24/20 at 15:45;  Stop 5/26/20 at 14:14;  Status DC


Sodium Acetate 50 meq/Potassium Acetate 55 meq/ Magnesium Sulfate 20 meq/Calcium

Gluconate 10 meq/ Multivitamins 10 ml/Chromium/ Copper/Manganese/ Seleni/Zn 0.5 

ml/ Insulin Human Regular 35 unit/ Total Parenteral Nutrition/Amino 

Acids/Dextrose/ Fat Emulsion Intravenous 1,800 ml @  75 mls/hr TPN  CONT IV  

Last administered on 4/25/20at 22:03;  Start 4/25/20 at 22:00;  Stop 4/26/20 at 

21:59;  Status DC


Daptomycin 430 mg/ Sodium Chloride 50 ml @  100 mls/hr Q24H IV  Last 

administered on 4/30/20at 13:00;  Start 4/25/20 at 13:00;  Stop 4/30/20 at 

20:58;  Status DC


Heparin Sodium (Porcine) 1000 unit/Sodium Chloride 1,001 ml @  1,001 mls/hr 1X  

ONCE IRR ;  Start 4/27/20 at 06:00;  Stop 4/27/20 at 06:59;  Status DC


Potassium Acetate 55 meq/Magnesium Sulfate 20 meq/ Calcium Gluconate 10 meq/ 

Multivitamins 10 ml/Chromium/ Copper/Manganese/ Seleni/Zn 0.5 ml/ Insulin Human 

Regular 35 unit/ Total Parenteral Nutrition/Amino Acids/Dextrose/ Fat Emulsion 

Intravenous 1,920 ml @  80 mls/hr TPN  CONT IV  Last administered on 4/26/20at 

22:10;  Start 4/26/20 at 22:00;  Stop 4/27/20 at 21:59;  Status DC


Dexamethasone Sodium Phosphate (Decadron) 4 mg STK-MED ONCE .ROUTE ;  Start 

4/27/20 at 10:56;  Stop 4/27/20 at 10:57;  Status DC


Ondansetron HCl (Zofran) 4 mg STK-MED ONCE .ROUTE ;  Start 4/27/20 at 10:56;  

Stop 4/27/20 at 10:57;  Status DC


Rocuronium Bromide (Zemuron) 50 mg STK-MED ONCE .ROUTE ;  Start 4/27/20 at 

10:56;  Stop 4/27/20 at 10:57;  Status DC


Fentanyl Citrate (Fentanyl 2ml Vial) 100 mcg STK-MED ONCE .ROUTE ;  Start 

4/27/20 at 10:56;  Stop 4/27/20 at 10:57;  Status DC


Bupivacaine HCl/ Epinephrine Bitart (Sensorcain-Epi 0.5%-1:[id number] Mpf) 30 ml 

STK-MED ONCE .ROUTE  Last administered on 4/27/20at 12:01;  Start 4/27/20 at 

10:58;  Stop 4/27/20 at 10:58;  Status DC


Cellulose (Surgicel Hemostat 2x14) 1 each STK-MED ONCE .ROUTE ;  Start 4/27/20 

at 10:58;  Stop 4/27/20 at 10:59;  Status DC


Iohexol (Omnipaque 300 Mg/ml) 50 ml STK-MED ONCE .ROUTE ;  Start 4/27/20 at 

10:58;  Stop 4/27/20 at 10:59;  Status DC


Cellulose (Surgicel Hemostat 4x8) 1 each STK-MED ONCE .ROUTE ;  Start 4/27/20 at

10:58;  Stop 4/27/20 at 10:59;  Status DC


Bisacodyl (Dulcolax Supp) 10 mg STK-MED ONCE .ROUTE ;  Start 4/27/20 at 10:59;  

Stop 4/27/20 at 10:59;  Status DC


Heparin Sodium (Porcine) 1000 unit/Sodium Chloride 1,001 ml @  1,001 mls/hr 1X  

ONCE IRR ;  Start 4/27/20 at 12:00;  Stop 4/27/20 at 12:59;  Status DC


Propofol 20 ml @ As Directed STK-MED ONCE IV ;  Start 4/27/20 at 11:05;  Stop 

4/27/20 at 11:05;  Status DC


Sevoflurane (Ultane) 90 ml STK-MED ONCE IH ;  Start 4/27/20 at 11:05;  Stop 

4/27/20 at 11:05;  Status DC


Sevoflurane (Ultane) 60 ml STK-MED ONCE IH ;  Start 4/27/20 at 12:26;  Stop 

4/27/20 at 12:27;  Status DC


Propofol 20 ml @ As Directed STK-MED ONCE IV ;  Start 4/27/20 at 12:26;  Stop 

4/27/20 at 12:27;  Status DC


Phenylephrine HCl (PHENYLEPHRINE in 0.9% NACL PF) 1 mg STK-MED ONCE IV ;  Start 

4/27/20 at 12:34;  Stop 4/27/20 at 12:34;  Status DC


Heparin Sodium (Porcine) (Heparin Sodium) 5,000 unit Q12HR SQ  Last administered

on 5/6/20at 20:57;  Start 4/27/20 at 21:00;  Stop 5/7/20 at 09:59;  Status DC


Sodium Chloride (Normal Saline Flush) 3 ml QSHIFT  PRN IV AFTER MEDS AND BLOOD 

DRAWS;  Start 4/27/20 at 13:45


Naloxone HCl (Narcan) 0.4 mg PRN Q2MIN  PRN IV SEE INSTRUCTIONS Last 

administered on 6/6/20at 15:15;  Start 4/27/20 at 13:45


Sodium Chloride 1,000 ml @  25 mls/hr Q24H IV  Last administered on 5/26/20at 

13:37;  Start 4/27/20 at 13:37;  Stop 5/29/20 at 13:09;  Status DC


Naloxone HCl (Narcan) 0.4 mg PRN Q2MIN  PRN IV SEE INSTRUCTIONS;  Start 4/27/20 

at 14:30;  Status UNV


Sodium Chloride 1,000 ml @  25 mls/hr Q24H IV ;  Start 4/27/20 at 14:30;  Status

UNV


Hydromorphone HCl 30 ml @ 0 mls/hr CONT PRN  PRN IV PER PROTOCOL Last 

administered on 5/2/20at 16:08;  Start 4/27/20 at 14:30;  Stop 5/4/20 at 08:55; 

Status DC


Potassium Acetate 55 meq/Magnesium Sulfate 20 meq/ Calcium Gluconate 10 meq/ 

Multivitamins 10 ml/Chromium/ Copper/Manganese/ Seleni/Zn 0.5 ml/ Insulin Human 

Regular 35 unit/ Total Parenteral Nutrition/Amino Acids/Dextrose/ Fat Emulsion 

Intravenous 1,920 ml @  80 mls/hr TPN  CONT IV  Last administered on 4/27/20at 

22:01;  Start 4/27/20 at 22:00;  Stop 4/28/20 at 21:59;  Status DC


Bumetanide (Bumex) 2 mg BID92 IV  Last administered on 5/1/20at 13:50;  Start 

4/28/20 at 14:00;  Stop 5/2/20 at 14:10;  Status DC


Meropenem 1 gm/ Sodium Chloride 100 ml @  200 mls/hr Q8HRS IV  Last administered

on 5/22/20at 05:53;  Start 4/28/20 at 14:00;  Stop 5/22/20 at 09:31;  Status DC


Potassium Acetate 55 meq/Magnesium Sulfate 20 meq/ Calcium Gluconate 10 meq/ 

Multivitamins 10 ml/Chromium/ Copper/Manganese/ Seleni/Zn 0.5 ml/ Insulin Human 

Regular 35 unit/ Total Parenteral Nutrition/Amino Acids/Dextrose/ Fat Emulsion 

Intravenous 1,920 ml @  80 mls/hr TPN  CONT IV  Last administered on 4/28/20at 

22:02;  Start 4/28/20 at 22:00;  Stop 4/29/20 at 21:59;  Status DC


Hydromorphone HCl (Dilaudid Standard PCA) 12 mg STK-MED ONCE IV ;  Start 4/27/20

at 14:35;  Stop 4/28/20 at 13:53;  Status DC


Artificial Tears (Artificial Tears) 1 drop PRN Q15MIN  PRN OU DRY EYE Last 

administered on 6/23/20at 21:17;  Start 4/29/20 at 05:30


Hydromorphone HCl (Dilaudid Standard PCA) 12 mg STK-MED ONCE IV ;  Start 4/28/20

at 12:05;  Stop 4/29/20 at 09:15;  Status DC


Potassium Acetate 65 meq/Magnesium Sulfate 20 meq/ Calcium Gluconate 10 meq/ 

Multivitamins 10 ml/Chromium/ Copper/Manganese/ Seleni/Zn 0.5 ml/ Insulin Human 

Regular 30 unit/ Total Parenteral Nutrition/Amino Acids/Dextrose/ Fat Emulsion 

Intravenous 1,920 ml @  80 mls/hr TPN  CONT IV  Last administered on 4/29/20at 

22:22;  Start 4/29/20 at 22:00;  Stop 4/30/20 at 21:59;  Status DC


Cyclobenzaprine HCl (Flexeril) 10 mg PRN Q6HRS  PRN PO MUSCLE SPASMS;  Start 

4/30/20 at 10:45


Potassium Acetate 55 meq/Magnesium Sulfate 20 meq/ Calcium Gluconate 10 meq/ 

Multivitamins 10 ml/Chromium/ Copper/Manganese/ Seleni/Zn 0.5 ml/ Insulin Human 

Regular 30 unit/ Total Parenteral Nutrition/Amino Acids/Dextrose/ Fat Emulsion 

Intravenous 1,920 ml @  80 mls/hr TPN  CONT IV  Last administered on 5/1/20at 

01:00;  Start 4/30/20 at 22:00;  Stop 5/1/20 at 21:59;  Status DC


Magnesium Sulfate 50 ml @ 25 mls/hr 1X  ONCE IV  Last administered on 4/30/20at 

17:18;  Start 4/30/20 at 12:45;  Stop 4/30/20 at 14:44;  Status DC


Potassium Chloride/Water 100 ml @  100 mls/hr 1X  ONCE IV  Last administered on 

5/1/20at 11:27;  Start 5/1/20 at 12:00;  Stop 5/1/20 at 12:59;  Status DC


Hydromorphone HCl (Dilaudid Standard PCA) 12 mg STK-MED ONCE IV ;  Start 4/29/20

at 10:50;  Stop 5/1/20 at 11:02;  Status DC


Hydromorphone HCl (Dilaudid Standard PCA) 12 mg STK-MED ONCE IV ;  Start 4/30/20

at 13:47;  Stop 5/1/20 at 11:03;  Status DC


Potassium Acetate 30 meq/Magnesium Sulfate 20 meq/ Calcium Gluconate 10 meq/ 

Multivitamins 10 ml/Chromium/ Copper/Manganese/ Seleni/Zn 0.5 ml/ Insulin Human 

Regular 30 unit/ Potassium Chloride 30 meq/ Total Parenteral Nutrition/Amino 

Acids/Dextrose/ Fat Emulsion Intravenous 1,920 ml @  80 mls/hr TPN  CONT IV  

Last administered on 5/1/20at 22:34;  Start 5/1/20 at 22:00;  Stop 5/2/20 at 

21:59;  Status DC


Potassium Chloride/Water 100 ml @  100 mls/hr Q1H IV  Last administered on 

5/2/20at 13:05;  Start 5/2/20 at 07:00;  Stop 5/2/20 at 10:59;  Status DC


Magnesium Sulfate 50 ml @ 25 mls/hr 1X  ONCE IV  Last administered on 5/2/20at 

10:34;  Start 5/2/20 at 10:30;  Stop 5/2/20 at 12:29;  Status DC


Potassium Chloride 75 meq/ Magnesium Sulfate 20 meq/Calcium Gluconate 10 meq/ 

Multivitamins 10 ml/Chromium/ Copper/Manganese/ Seleni/Zn 0.5 ml/ Insulin Human 

Regular 30 unit/ Total Parenteral Nutrition/Amino Acids/Dextrose/ Fat Emulsion 

Intravenous 1,920 ml @  80 mls/hr TPN  CONT IV  Last administered on 5/2/20at 

21:51;  Start 5/2/20 at 22:00;  Stop 5/3/20 at 22:00;  Status DC


Potassium Chloride 75 meq/ Magnesium Sulfate 20 meq/Calcium Gluconate 10 meq/ 

Multivitamins 10 ml/Chromium/ Copper/Manganese/ Seleni/Zn 0.5 ml/ Insulin Human 

Regular 25 unit/ Total Parenteral Nutrition/Amino Acids/Dextrose/ Fat Emulsion 

Intravenous 1,920 ml @  80 mls/hr TPN  CONT IV  Last administered on 5/3/20at 

22:04;  Start 5/3/20 at 22:00;  Stop 5/4/20 at 21:59;  Status DC


Hydromorphone HCl (Dilaudid) 0.4 mg PRN Q4HRS  PRN IVP PAIN Last administered on

5/4/20at 10:57;  Start 5/4/20 at 09:00;  Stop 5/4/20 at 18:59;  Status DC


Micafungin Sodium 100 mg/Dextrose 100 ml @  100 mls/hr Q24H IV  Last 

administered on 5/26/20at 12:17;  Start 5/4/20 at 11:00;  Stop 5/27/20 at 09:59;

 Status DC


Daptomycin 485 mg/ Sodium Chloride 50 ml @  100 mls/hr Q24H IV  Last 

administered on 5/11/20at 13:10;  Start 5/4/20 at 11:00;  Stop 5/12/20 at 07:44;

 Status DC


Potassium Chloride 75 meq/ Magnesium Sulfate 15 meq/Calcium Gluconate 8 meq/ 

Multivitamins 10 ml/Chromium/ Copper/Manganese/ Seleni/Zn 0.5 ml/ Insulin Human 

Regular 25 unit/ Total Parenteral Nutrition/Amino Acids/Dextrose/ Fat Emulsion 

Intravenous 1,920 ml @  80 mls/hr TPN  CONT IV  Last administered on 5/4/20at 

23:08;  Start 5/4/20 at 22:00;  Stop 5/5/20 at 21:59;  Status DC


Haloperidol Lactate (Haldol Inj) 3 mg 1X  ONCE IVP  Last administered on 

5/4/20at 14:37;  Start 5/4/20 at 14:30;  Stop 5/4/20 at 14:31;  Status DC


Hydromorphone HCl (Dilaudid) 1 mg PRN Q4HRS  PRN IVP PAIN Last administered on 

5/18/20at 06:25;  Start 5/4/20 at 19:00;  Stop 5/18/20 at 17:10;  Status DC


Potassium Chloride 75 meq/ Magnesium Sulfate 15 meq/Calcium Gluconate 8 meq/ 

Multivitamins 10 ml/Chromium/ Copper/Manganese/ Seleni/Zn 0.5 ml/ Insulin Human 

Regular 20 unit/ Total Parenteral Nutrition/Amino Acids/Dextrose/ Fat Emulsion 

Intravenous 1,920 ml @  80 mls/hr TPN  CONT IV  Last administered on 5/5/20at 22

:10;  Start 5/5/20 at 22:00;  Stop 5/6/20 at 21:59;  Status DC


Lidocaine HCl (Buffered Lidocaine 1%) 3 ml STK-MED ONCE .ROUTE ;  Start 5/6/20 

at 11:31;  Stop 5/6/20 at 11:31;  Status DC


Lidocaine HCl (Buffered Lidocaine 1%) 3 ml STK-MED ONCE .ROUTE ;  Start 5/6/20 

at 12:28;  Stop 5/6/20 at 12:29;  Status DC


Lidocaine HCl (Buffered Lidocaine 1%) 6 ml 1X  ONCE INJ  Last administered on 

5/6/20at 12:53;  Start 5/6/20 at 12:45;  Stop 5/6/20 at 12:46;  Status DC


Potassium Chloride 75 meq/ Magnesium Sulfate 15 meq/Calcium Gluconate 8 meq/ 

Multivitamins 10 ml/Chromium/ Copper/Manganese/ Seleni/Zn 0.5 ml/ Insulin Human 

Regular 20 unit/ Total Parenteral Nutrition/Amino Acids/Dextrose/ Fat Emulsion 

Intravenous 1,920 ml @  80 mls/hr TPN  CONT IV  Last administered on 5/6/20at 

22:00;  Start 5/6/20 at 22:00;  Stop 5/7/20 at 21:59;  Status DC


Potassium Chloride 75 meq/ Magnesium Sulfate 15 meq/Calcium Gluconate 8 meq/ 

Multivitamins 10 ml/Chromium/ Copper/Manganese/ Seleni/Zn 0.5 ml/ Insulin Human 

Regular 15 unit/ Total Parenteral Nutrition/Amino Acids/Dextrose/ Fat Emulsion 

Intravenous 1,920 ml @  80 mls/hr TPN  CONT IV  Last administered on 5/7/20at 

22:28;  Start 5/7/20 at 22:00;  Stop 5/8/20 at 21:59;  Status DC


Vecuronium Bromide (Norcuron Bolus) 6 mg PRN Q6HRS  PRN IV VENT ASYNCHRONY;  

Start 5/7/20 at 19:15;  Stop 5/7/20 at 19:35;  Status DC


Bumetanide (Bumex) 2 mg 1X  ONCE IV  Last administered on 5/7/20at 22:09;  Start

5/7/20 at 19:45;  Stop 5/7/20 at 19:46;  Status DC


Lidocaine HCl (Buffered Lidocaine 1%) 3 ml STK-MED ONCE .ROUTE ;  Start 5/8/20 

at 07:59;  Stop 5/8/20 at 07:59;  Status DC


Midazolam HCl (Versed) 5 mg STK-MED ONCE .ROUTE ;  Start 5/8/20 at 08:36;  Stop 

5/8/20 at 08:36;  Status DC


Fentanyl Citrate (Fentanyl 5ml Vial) 250 mcg STK-MED ONCE .ROUTE ;  Start 5/8/20

at 08:36;  Stop 5/8/20 at 08:37;  Status DC


Lidocaine HCl (Buffered Lidocaine 1%) 3 ml 1X  ONCE IJ  Last administered on 5/ 8/20at 09:30;  Start 5/8/20 at 09:15;  Stop 5/8/20 at 09:16;  Status DC


Midazolam HCl (Versed) 5 mg 1X  ONCE IV  Last administered on 5/8/20at 09:30;  

Start 5/8/20 at 09:15;  Stop 5/8/20 at 09:16;  Status DC


Fentanyl Citrate (Fentanyl 5ml Vial) 250 mcg 1X  ONCE IV  Last administered on 

5/8/20at 09:30;  Start 5/8/20 at 09:15;  Stop 5/8/20 at 09:16;  Status DC


Bumetanide (Bumex) 2 mg DAILY IV  Last administered on 5/18/20at 08:07;  Start 

5/8/20 at 10:00;  Stop 5/18/20 at 17:15;  Status DC


Potassium Chloride 75 meq/ Magnesium Sulfate 15 meq/ Multivitamins 10 

ml/Chromium/ Copper/Manganese/ Seleni/Zn 0.5 ml/ Insulin Human Regular 15 unit/ 

Total Parenteral Nutrition/Amino Acids/Dextrose/ Fat Emulsion Intravenous 1,920 

ml @  80 mls/hr TPN  CONT IV  Last administered on 5/8/20at 21:59;  Start 5/8/20

at 22:00;  Stop 5/9/20 at 21:59;  Status DC


Metoclopramide HCl (Reglan Vial) 10 mg PRN Q3HRS  PRN IVP NAUSEA/VOMITING-3rd 

choice Last administered on 5/14/20at 04:25;  Start 5/9/20 at 16:45


Potassium Chloride 75 meq/ Magnesium Sulfate 15 meq/ Multivitamins 10 

ml/Chromium/ Copper/Manganese/ Seleni/Zn 0.5 ml/ Insulin Human Regular 15 unit/ 

Total Parenteral Nutrition/Amino Acids/Dextrose/ Fat Emulsion Intravenous 1,920 

ml @  80 mls/hr TPN  CONT IV  Last administered on 5/9/20at 22:41;  Start 5/9/20

at 22:00;  Stop 5/10/20 at 21:59;  Status DC


Magnesium Sulfate 50 ml @ 25 mls/hr 1X  ONCE IV  Last administered on 5/10/20at 

10:44;  Start 5/10/20 at 09:00;  Stop 5/10/20 at 10:59;  Status DC


Potassium Chloride/Water 100 ml @  100 mls/hr 1X  ONCE IV  Last administered on 

5/10/20at 09:37;  Start 5/10/20 at 09:00;  Stop 5/10/20 at 09:59;  Status DC


Duloxetine HCl (Cymbalta) 30 mg DAILY PO  Last administered on 5/11/20at 09:48; 

Start 5/10/20 at 14:00;  Stop 5/13/20 at 10:25;  Status DC


Potassium Chloride 80 meq/ Magnesium Sulfate 20 meq/ Multivitamins 10 

ml/Chromium/ Copper/Manganese/ Seleni/Zn 0.5 ml/ Insulin Human Regular 15 unit/ 

Total Parenteral Nutrition/Amino Acids/Dextrose/ Fat Emulsion Intravenous 1,920 

ml @  80 mls/hr TPN  CONT IV  Last administered on 5/10/20at 21:42;  Start 

5/10/20 at 22:00;  Stop 5/11/20 at 21:59;  Status DC


Potassium Chloride 80 meq/ Magnesium Sulfate 20 meq/ Multivitamins 10 

ml/Chromium/ Copper/Manganese/ Seleni/Zn 0.5 ml/ Insulin Human Regular 15 unit/ 

Total Parenteral Nutrition/Amino Acids/Dextrose/ Fat Emulsion Intravenous 1,920 

ml @  80 mls/hr TPN  CONT IV  Last administered on 5/11/20at 22:20;  Start 

5/11/20 at 22:00;  Stop 5/12/20 at 21:59;  Status DC


Lidocaine HCl (Buffered Lidocaine 1%) 3 ml STK-MED ONCE .ROUTE ;  Start 5/12/20 

at 09:54;  Stop 5/12/20 at 09:55;  Status DC


Hydromorphone HCl (Dilaudid Standard PCA) 12 mg STK-MED ONCE IV ;  Start 5/1/20 

at 15:50;  Stop 5/12/20 at 11:24;  Status DC


Potassium Chloride 80 meq/ Magnesium Sulfate 20 meq/ Multivitamins 10 

ml/Chromium/ Copper/Manganese/ Seleni/Zn 0.5 ml/ Insulin Human Regular 15 unit/ 

Total Parenteral Nutrition/Amino Acids/Dextrose/ Fat Emulsion Intravenous 1,920 

ml @  80 mls/hr TPN  CONT IV  Last administered on 5/12/20at 21:40;  Start 

5/12/20 at 22:00;  Stop 5/13/20 at 21:59;  Status DC


Lidocaine HCl (Buffered Lidocaine 1%) 6 ml 1X  ONCE INJ  Last administered on 

5/12/20at 14:15;  Start 5/12/20 at 14:15;  Stop 5/12/20 at 14:16;  Status DC


Potassium Chloride 80 meq/ Magnesium Sulfate 20 meq/ Multivitamins 10 

ml/Chromium/ Copper/Manganese/ Seleni/Zn 1 ml/ Insulin Human Regular 15 unit/ 

Total Parenteral Nutrition/Amino Acids/Dextrose/ Fat Emulsion Intravenous 1,920 

ml @  80 mls/hr TPN  CONT IV  Last administered on 5/13/20at 22:04;  Start 

5/13/20 at 22:00;  Stop 5/14/20 at 21:59;  Status DC


Potassium Chloride/Water 100 ml @  100 mls/hr 1X  ONCE IV  Last administered on 

5/14/20at 11:34;  Start 5/14/20 at 11:00;  Stop 5/14/20 at 11:59;  Status DC


Potassium Chloride 90 meq/ Magnesium Sulfate 20 meq/ Multivitamins 10 

ml/Chromium/ Copper/Manganese/ Seleni/Zn 1 ml/ Insulin Human Regular 15 unit/ 

Total Parenteral Nutrition/Amino Acids/Dextrose/ Fat Emulsion Intravenous 1,920 

ml @  80 mls/hr TPN  CONT IV  Last administered on 5/14/20at 22:57;  Start 

5/14/20 at 22:00;  Stop 5/15/20 at 21:59;  Status DC


Potassium Chloride 90 meq/ Magnesium Sulfate 20 meq/ Multivitamins 10 

ml/Chromium/ Copper/Manganese/ Seleni/Zn 1 ml/ Insulin Human Regular 15 unit/ 

Total Parenteral Nutrition/Amino Acids/Dextrose/ Fat Emulsion Intravenous 1,920 

ml @  80 mls/hr TPN  CONT IV  Last administered on 5/15/20at 22:48;  Start 

5/15/20 at 22:00;  Stop 5/16/20 at 21:59;  Status DC


Potassium Chloride 90 meq/ Magnesium Sulfate 20 meq/ Multivitamins 10 

ml/Chromium/ Copper/Manganese/ Seleni/Zn 1 ml/ Insulin Human Regular 15 unit/ 

Total Parenteral Nutrition/Amino Acids/Dextrose/ Fat Emulsion Intravenous 1,890 

ml @  78.75 mls/ hr TPN  CONT IV  Last administered on 5/16/20at 22:15;  Start 

5/16/20 at 22:00;  Stop 5/17/20 at 21:59;  Status DC


Linezolid/Dextrose 300 ml @  300 mls/hr Q12HR IV  Last administered on 5/19/20at

21:08;  Start 5/17/20 at 09:00;  Stop 5/20/20 at 08:11;  Status DC


Daptomycin 450 mg/ Sodium Chloride 50 ml @  100 mls/hr Q24H IV  Last 

administered on 5/20/20at 09:25;  Start 5/17/20 at 09:00;  Stop 5/21/20 at 

08:30;  Status DC


Potassium Chloride 90 meq/ Magnesium Sulfate 20 meq/ Multivitamins 10 

ml/Chromium/ Copper/Manganese/ Seleni/Zn 1 ml/ Insulin Human Regular 15 unit/ 

Total Parenteral Nutrition/Amino Acids/Dextrose/ Fat Emulsion Intravenous 1,890 

ml @  78.75 mls/ hr TPN  CONT IV  Last administered on 5/17/20at 21:34;  Start 

5/17/20 at 22:00;  Stop 5/18/20 at 21:59;  Status DC


Lorazepam (Ativan Inj) 2 mg STK-MED ONCE .ROUTE ;  Start 5/17/20 at 14:58;  Stop

5/17/20 at 14:58;  Status DC


Metoprolol Tartrate (Lopressor Vial) 5 mg 1X  ONCE IVP  Last administered on 

5/17/20at 15:31;  Start 5/17/20 at 15:15;  Stop 5/17/20 at 15:16;  Status DC


Lorazepam (Ativan Inj) 2 mg 1X  ONCE IVP  Last administered on 5/17/20at 15:30; 

Start 5/17/20 at 15:15;  Stop 5/17/20 at 15:16;  Status DC


Enoxaparin Sodium (Lovenox 40mg Syringe) 40 mg Q24H SQ  Last administered on 

6/5/20at 17:44;  Start 5/17/20 at 17:00;  Stop 6/7/20 at 06:50;  Status DC


Lorazepam (Ativan Inj) 1 mg PRN Q4HRS  PRN IVP ANXIETY / AGITATION MILD-MOD Last

administered on 5/31/20at 15:55;  Start 5/17/20 at 19:15;  Stop 6/2/20 at 11:45;

 Status DC


Lorazepam (Ativan Inj) 2 mg PRN Q4HRS  PRN IVP ANXIETY / AGITATION SEVERE Last 

administered on 6/1/20at 07:55;  Start 5/17/20 at 19:15;  Stop 6/2/20 at 11:45; 

Status DC


Fentanyl Citrate (Fentanyl 2ml Vial) 50 mcg PRN Q4HRS  PRN IVP SEVERE PAIN Last 

administered on 6/13/20at 05:15;  Start 5/18/20 at 13:15;  Stop 6/14/20 at 

09:29;  Status DC


Fentanyl Citrate (Fentanyl 2ml Vial) 25 mcg PRN Q4HRS  PRN IVP MODERATE PAIN 

Last administered on 6/13/20at 00:27;  Start 5/18/20 at 13:15;  Stop 6/14/20 at 

09:30;  Status DC


Potassium Chloride 90 meq/ Magnesium Sulfate 20 meq/ Multivitamins 10 

ml/Chromium/ Copper/Manganese/ Seleni/Zn 1 ml/ Insulin Human Regular 15 unit/ 

Total Parenteral Nutrition/Amino Acids/Dextrose/ Fat Emulsion Intravenous 1,890 

ml @  78.75 mls/ hr TPN  CONT IV  Last administered on 5/18/20at 22:18;  Start 

5/18/20 at 22:00;  Stop 5/19/20 at 21:59;  Status DC


Furosemide (Lasix) 40 mg 1X  ONCE IVP  Last administered on 5/18/20at 21:51;  

Start 5/18/20 at 21:45;  Stop 5/18/20 at 21:48;  Status DC


Albumin Human 100 ml @  100 mls/hr 1X PRN  PRN IV SEE COMMENTS;  Start 5/19/20 

at 01:30


Furosemide (Lasix) 40 mg [id number] IVP  Last administered on 6/3/20at 08:04;  Start 

5/19/20 at 14:00;  Stop 6/3/20 at 13:07;  Status DC


Potassium Chloride 90 meq/ Magnesium Sulfate 20 meq/ Multivitamins 10 

ml/Chromium/ Copper/Manganese/ Seleni/Zn 1 ml/ Insulin Human Regular 15 unit/ 

Total Parenteral Nutrition/Amino Acids/Dextrose/ Fat Emulsion Intravenous 1,800 

ml @  75 mls/hr TPN  CONT IV  Last administered on 5/19/20at 22:31;  Start 

5/19/20 at 22:00;  Stop 5/20/20 at 21:59;  Status DC


Potassium Chloride 90 meq/ Magnesium Sulfate 20 meq/ Multivitamins 10 

ml/Chromium/ Copper/Manganese/ Seleni/Zn 1 ml/ Insulin Human Regular 15 unit/ 

Total Parenteral Nutrition/Amino Acids/Dextrose/ Fat Emulsion Intravenous 1,800 

ml @  75 mls/hr TPN  CONT IV  Last administered on 5/20/20at 22:28;  Start 

5/20/20 at 22:00;  Stop 5/21/20 at 21:59;  Status DC


Potassium Chloride 110 meq/ Magnesium Sulfate 20 meq/ Multivitamins 10 

ml/Chromium/ Copper/Manganese/ Seleni/Zn 1 ml/ Insulin Human Regular 15 unit/ 

Total Parenteral Nutrition/Amino Acids/Dextrose/ Fat Emulsion Intravenous 1,800 

ml @  75 mls/hr TPN  CONT IV  Last administered on 5/21/20at 22:01;  Start 

5/21/20 at 22:00;  Stop 5/22/20 at 21:59;  Status DC


Saliva Substitute (Biotene Moisturizing Mouth) 2 spray PRN Q15MIN  PRN PO DRY 

MOUTH;  Start 5/21/20 at 11:00


Potassium Chloride 110 meq/ Magnesium Sulfate 20 meq/ Multivitamins 10 

ml/Chromium/ Copper/Manganese/ Seleni/Zn 1 ml/ Insulin Human Regular 15 unit/ 

Total Parenteral Nutrition/Amino Acids/Dextrose/ Fat Emulsion Intravenous 1,800 

ml @  75 mls/hr TPN  CONT IV  Last administered on 5/22/20at 22:21;  Start 

5/22/20 at 22:00;  Stop 5/23/20 at 21:59;  Status DC


Potassium Chloride 110 meq/ Magnesium Sulfate 20 meq/ Multivitamins 10 

ml/Chromium/ Copper/Manganese/ Seleni/Zn 1 ml/ Insulin Human Regular 15 unit/ 

Total Parenteral Nutrition/Amino Acids/Dextrose/ Fat Emulsion Intravenous 1,800 

ml @  75 mls/hr TPN  CONT IV  Last administered on 5/23/20at 22:04;  Start 

5/23/20 at 22:00;  Stop 5/24/20 at 21:59;  Status DC


Potassium Chloride 110 meq/ Magnesium Sulfate 20 meq/ Multivitamins 10 

ml/Chromium/ Copper/Manganese/ Seleni/Zn 1 ml/ Insulin Human Regular 15 unit/ 

Total Parenteral Nutrition/Amino Acids/Dextrose/ Fat Emulsion Intravenous 1,800 

ml @  75 mls/hr TPN  CONT IV  Last administered on 5/24/20at 22:48;  Start 

5/24/20 at 22:00;  Stop 5/25/20 at 21:59;  Status DC


Potassium Chloride 70 meq/ Magnesium Sulfate 20 meq/ Multivitamins 10 

ml/Chromium/ Copper/Manganese/ Seleni/Zn 1 ml/ Insulin Human Regular 15 unit/ 

Total Parenteral Nutrition/Amino Acids/Dextrose/ Fat Emulsion Intravenous 1,800 

ml @  75 mls/hr TPN  CONT IV  Last administered on 5/25/20at 21:39;  Start 

5/25/20 at 22:00;  Stop 5/26/20 at 21:59;  Status DC


Meropenem 500 mg/ Sodium Chloride 50 ml @  100 mls/hr Q6HRS IV  Last 

administered on 5/27/20at 06:02;  Start 5/25/20 at 18:00;  Stop 5/27/20 at 

09:59;  Status DC


Barium Sulfate (Varibar Thin Liquid Apple) 148 gm 1X  ONCE PO ;  Start 5/26/20 

at 11:45;  Stop 5/26/20 at 11:49;  Status DC


Potassium Chloride 70 meq/ Magnesium Sulfate 20 meq/ Multivitamins 10 

ml/Chromium/ Copper/Manganese/ Seleni/Zn 1 ml/ Insulin Human Regular 15 unit/ 

Total Parenteral Nutrition/Amino Acids/Dextrose/ Fat Emulsion Intravenous 1,800 

ml @  75 mls/hr TPN  CONT IV  Last administered on 5/26/20at 22:27;  Start 

5/26/20 at 22:00;  Stop 5/27/20 at 21:59;  Status DC


Piperacillin Sod/ Tazobactam Sod 3.375 gm/Sodium Chloride 50 ml @  100 mls/hr 

Q6HRS IV  Last administered on 6/4/20at 06:10;  Start 5/27/20 at 12:00;  Stop 

6/4/20 at 07:26;  Status DC


Potassium Chloride 70 meq/ Magnesium Sulfate 20 meq/ Multivitamins 10 

ml/Chromium/ Copper/Manganese/ Seleni/Zn 1 ml/ Insulin Human Regular 15 unit/ 

Total Parenteral Nutrition/Amino Acids/Dextrose/ Fat Emulsion Intravenous 1,800 

ml @  75 mls/hr TPN  CONT IV  Last administered on 5/27/20at 22:03;  Start 

5/27/20 at 22:00;  Stop 5/28/20 at 21:59;  Status DC


Potassium Chloride 70 meq/ Magnesium Sulfate 20 meq/ Multivitamins 10 

ml/Chromium/ Copper/Manganese/ Seleni/Zn 1 ml/ Insulin Human Regular 15 unit/ 

Total Parenteral Nutrition/Amino Acids/Dextrose/ Fat Emulsion Intravenous 1,800 

ml @  75 mls/hr TPN  CONT IV  Last administered on 5/28/20at 22:33;  Start 

5/28/20 at 22:00;  Stop 5/29/20 at 21:59;  Status DC


Potassium Chloride 70 meq/ Magnesium Sulfate 20 meq/ Multivitamins 10 

ml/Chromium/ Copper/Manganese/ Seleni/Zn 1 ml/ Insulin Human Regular 15 unit/ 

Total Parenteral Nutrition/Amino Acids/Dextrose/ Fat Emulsion Intravenous 1,800 

ml @  75 mls/hr TPN  CONT IV  Last administered on 5/29/20at 23:13;  Start 

5/29/20 at 22:00;  Stop 5/30/20 at 21:59;  Status DC


Potassium Chloride 80 meq/ Magnesium Sulfate 20 meq/ Multivitamins 10 

ml/Chromium/ Copper/Manganese/ Seleni/Zn 1 ml/ Insulin Human Regular 15 unit/ 

Total Parenteral Nutrition/Amino Acids/Dextrose/ Fat Emulsion Intravenous 1,800 

ml @  75 mls/hr TPN  CONT IV  Last administered on 5/30/20at 22:30;  Start 

5/30/20 at 22:00;  Stop 5/31/20 at 21:59;  Status DC


Potassium Chloride 80 meq/ Magnesium Sulfate 20 meq/ Multivitamins 10 

ml/Chromium/ Copper/Manganese/ Seleni/Zn 1 ml/ Insulin Human Regular 15 unit/ 

Total Parenteral Nutrition/Amino Acids/Dextrose/ Fat Emulsion Intravenous 1,800 

ml @  75 mls/hr TPN  CONT IV  Last administered on 5/31/20at 21:54;  Start 

5/31/20 at 22:00;  Stop 6/1/20 at 21:59;  Status DC


Potassium Chloride/Water 100 ml @  100 mls/hr 1X  ONCE IV  Last administered on 

6/1/20at 10:15;  Start 6/1/20 at 10:00;  Stop 6/1/20 at 10:59;  Status DC


Potassium Chloride 90 meq/ Magnesium Sulfate 20 meq/ Multivitamins 10 ml/

Chromium/ Copper/Manganese/ Seleni/Zn 1 ml/ Insulin Human Regular 20 unit/ Total

Parenteral Nutrition/Amino Acids/Dextrose/ Fat Emulsion Intravenous 1,800 ml @  

75 mls/hr TPN  CONT IV  Last administered on 6/1/20at 22:28;  Start 6/1/20 at 

22:00;  Stop 6/2/20 at 21:59;  Status DC


Potassium Chloride 90 meq/ Magnesium Sulfate 20 meq/ Multivitamins 10 

ml/Chromium/ Copper/Manganese/ Seleni/Zn 1 ml/ Insulin Human Regular 20 unit/ 

Total Parenteral Nutrition/Amino Acids/Dextrose/ Fat Emulsion Intravenous 1,800 

ml @  75 mls/hr TPN  CONT IV  Last administered on 6/2/20at 22:08;  Start 6/2/20

at 22:00;  Stop 6/3/20 at 21:59;  Status DC


Lorazepam (Ativan Inj) 0.25 mg PRN Q4HRS  PRN IVP ANXIETY / AGITATION Last 

administered on 6/13/20at 02:25;  Start 6/3/20 at 07:30


Potassium Chloride 90 meq/ Magnesium Sulfate 20 meq/ Multivitamins 10 

ml/Chromium/ Copper/Manganese/ Seleni/Zn 1 ml/ Insulin Human Regular 20 unit/ 

Total Parenteral Nutrition/Amino Acids/Dextrose/ Fat Emulsion Intravenous 1,800 

ml @  75 mls/hr TPN  CONT IV  Last administered on 6/3/20at 23:13;  Start 6/3/20

at 22:00;  Stop 6/4/20 at 21:59;  Status DC


Furosemide (Lasix) 40 mg DAILY IVP  Last administered on 6/5/20at 11:14;  Start 

6/3/20 at 13:30;  Stop 6/7/20 at 09:12;  Status DC


Fluoxetine HCl (PROzac) 20 mg QHS PEG  Last administered on 6/25/20at 22:24;  

Start 6/4/20 at 21:00


Fentanyl (Duragesic 50mcg/ Hr Patch) 1 patch Q72H TD  Last administered on 

6/4/20at 21:22;  Start 6/4/20 at 21:00;  Stop 6/13/20 at 12:00;  Status DC


Potassium Chloride 40 meq/ Potassium Acetate 60 meq/Magnesium Sulfate 10 meq/ 

Multivitamins 10 ml/Chromium/ Copper/Manganese/ Seleni/Zn 1 ml/ Insulin Human 

Regular 20 unit/ Total Parenteral Nutrition/Amino Acids/Dextrose/ Fat Emulsion 

Intravenous 1,800 ml @  75 mls/hr TPN  CONT IV  Last administered on 6/5/20at 

00:03;  Start 6/4/20 at 22:00;  Stop 6/5/20 at 21:59;  Status DC


Potassium Acetate 80 meq/Magnesium Sulfate 5 meq/ Multivitamins 10 ml/Chromium/ 

Copper/Manganese/ Seleni/Zn 1 ml/ Insulin Human Regular 20 unit/ Total 

Parenteral Nutrition/Amino Acids/Dextrose/ Fat Emulsion Intravenous 1,920 ml @  

80 mls/hr TPN  CONT IV  Last administered on 6/5/20at 21:59;  Start 6/5/20 at 

22:00;  Stop 6/6/20 at 21:59;  Status DC


Potassium Acetate 60 meq/Magnesium Sulfate 5 meq/ Multivitamins 10 ml/Chromium/ 

Copper/Manganese/ Seleni/Zn 1 ml/ Insulin Human Regular 30 unit/ Total 

Parenteral Nutrition/Amino Acids/Dextrose/ Fat Emulsion Intravenous 1,920 ml @  

80 mls/hr TPN  CONT IV  Last administered on 6/6/20at 21:54;  Start 6/6/20 at 

22:00;  Stop 6/7/20 at 21:59;  Status DC


Norepinephrine Bitartrate 8 mg/ Dextrose 258 ml @  13.332 mls/ hr CONT  PRN IV 

PER PROTOCOL Last administered on 6/7/20at 21:46;  Start 6/7/20 at 06:30


Albumin Human 500 ml @  125 mls/hr 1X  ONCE IV  Last administered on 6/7/20at 

08:10;  Start 6/7/20 at 08:15;  Stop 6/7/20 at 12:14;  Status DC


Potassium Acetate 40 meq/Magnesium Sulfate 5 meq/ Multivitamins 10 ml/Chromium/ 

Copper/Manganese/ Seleni/Zn 1 ml/ Insulin Human Regular 30 unit/ Total 

Parenteral Nutrition/Amino Acids/Dextrose/ Fat Emulsion Intravenous 1,920 ml @  

80 mls/hr TPN  CONT IV  Last administered on 6/7/20at 22:23;  Start 6/7/20 at 

22:00;  Stop 6/8/20 at 21:59;  Status DC


Meropenem 1 gm/ Sodium Chloride 100 ml @  200 mls/hr Q8HRS IV ;  Start 6/7/20 at

14:00;  Status Cancel


Meropenem 1 gm/ Sodium Chloride 100 ml @  200 mls/hr Q8HRS IV  Last administered

on 6/7/20at 11:04;  Start 6/7/20 at 10:00;  Stop 6/7/20 at 13:00;  Status DC


Meropenem 1 gm/ Sodium Chloride 100 ml @  200 mls/hr Q12HR IV  Last administered

on 6/25/20at 08:27;  Start 6/7/20 at 21:00;  Stop 6/25/20 at 08:56;  Status DC


Sodium Chloride 1,000 ml @  1,000 mls/hr 1X  ONCE IV  Last administered on 

6/7/20at 11:06;  Start 6/7/20 at 10:45;  Stop 6/7/20 at 11:44;  Status DC


Micafungin Sodium 100 mg/Dextrose 100 ml @  100 mls/hr Q24H IV  Last 

administered on 6/24/20at 12:34;  Start 6/7/20 at 11:00;  Stop 6/25/20 at 08:56;

 Status DC


Daptomycin 410 mg/ Sodium Chloride 50 ml @  100 mls/hr Q24H IV  Last 

administered on 6/9/20at 13:33;  Start 6/7/20 at 14:00;  Stop 6/10/20 at 08:30; 

Status DC


Midazolam HCl (Versed) 2 mg STK-MED ONCE .ROUTE ;  Start 6/7/20 at 14:47;  Stop 

6/7/20 at 14:48;  Status DC


Fentanyl Citrate (Fentanyl 2ml Vial) 100 mcg STK-MED ONCE .ROUTE ;  Start 6/7/20

at 14:47;  Stop 6/7/20 at 14:48;  Status DC


Flumazenil (Romazicon) 0.5 mg STK-MED ONCE IV ;  Start 6/7/20 at 14:48;  Stop 

6/7/20 at 14:48;  Status DC


Naloxone HCl (Narcan) 0.4 mg STK-MED ONCE .ROUTE ;  Start 6/7/20 at 14:48;  Stop

6/7/20 at 14:48;  Status DC


Lidocaine HCl (Lidocaine 1% 20ml Vial) 20 ml STK-MED ONCE .ROUTE ;  Start 6/7/20

at 14:48;  Stop 6/7/20 at 14:48;  Status DC


Midazolam HCl (Versed) 2 mg 1X  ONCE IV  Last administered on 6/7/20at 15:28;  

Start 6/7/20 at 15:00;  Stop 6/7/20 at 15:01;  Status DC


Fentanyl Citrate (Fentanyl 2ml Vial) 100 mcg 1X  ONCE IV  Last administered on 

6/7/20at 15:28;  Start 6/7/20 at 15:00;  Stop 6/7/20 at 15:01;  Status DC


Lidocaine HCl (Lidocaine 1% 20ml Vial) 20 ml 1X  ONCE INJ  Last administered on 

6/7/20at 15:30;  Start 6/7/20 at 15:00;  Stop 6/7/20 at 15:01;  Status DC


Sodium Chloride 1,000 ml @  100 mls/hr Q10H IV  Last administered on 6/16/20at 

07:30;  Start 6/7/20 at 20:00;  Stop 6/16/20 at 11:26;  Status DC


Sodium Bicarbonate (Sodium Bicarb Adult 8.4% Syr) 50 meq 1X  ONCE IV  Last 

administered on 6/7/20at 21:47;  Start 6/7/20 at 22:00;  Stop 6/7/20 at 22:01;  

Status DC


Potassium Acetate 40 meq/Magnesium Sulfate 5 meq/ Multivitamins 10 ml/Chromium/ 

Copper/Manganese/ Seleni/Zn 1 ml/ Insulin Human Regular 30 unit/ Total 

Parenteral Nutrition/Amino Acids/Dextrose/ Fat Emulsion Intravenous 1,920 ml @  

80 mls/hr TPN  CONT IV  Last administered on 6/8/20at 22:28;  Start 6/8/20 at 

22:00;  Stop 6/9/20 at 21:59;  Status DC


Sodium Chloride 500 ml @  500 mls/hr 1X  ONCE IV  Last administered on 6/9/20at 

06:39;  Start 6/9/20 at 06:45;  Stop 6/9/20 at 07:44;  Status DC


Potassium Acetate 40 meq/Magnesium Sulfate 5 meq/ Multivitamins 10 ml/Chromium/ 

Copper/Manganese/ Seleni/Zn 1 ml/ Insulin Human Regular 30 unit/ Total 

Parenteral Nutrition/Amino Acids/Dextrose/ Fat Emulsion Intravenous 1,920 ml @  

80 mls/hr TPN  CONT IV  Last administered on 6/9/20at 22:03;  Start 6/9/20 at 

22:00;  Stop 6/10/20 at 21:59;  Status DC


Metoprolol Tartrate (Lopressor Vial) 5 mg PRN Q6HRS  PRN IVP HYPERTENSION Last 

administered on 6/18/20at 10:14;  Start 6/10/20 at 09:00


Potassium Acetate 40 meq/Magnesium Sulfate 5 meq/ Multivitamins 10 ml/Chromium/ 

Copper/Manganese/ Seleni/Zn 1 ml/ Insulin Human Regular 30 unit/ Total 

Parenteral Nutrition/Amino Acids/Dextrose/ Fat Emulsion Intravenous 1,920 ml @  

80 mls/hr TPN  CONT IV  Last administered on 6/10/20at 21:26;  Start 6/10/20 at 

22:00;  Stop 6/11/20 at 21:59;  Status DC


Potassium Acetate 40 meq/Magnesium Sulfate 5 meq/ Multivitamins 10 ml/Chromium/ 

Copper/Manganese/ Seleni/Zn 1 ml/ Insulin Human Regular 30 unit/ Total 

Parenteral Nutrition/Amino Acids/Dextrose/ Fat Emulsion Intravenous 1,920 ml @  

80 mls/hr TPN  CONT IV  Last administered on 6/11/20at 23:23;  Start 6/11/20 at 

22:00;  Stop 6/12/20 at 21:59;  Status DC


Potassium Acetate 40 meq/Magnesium Sulfate 5 meq/ Multivitamins 10 ml/Chromium/ 

Copper/Manganese/ Seleni/Zn 1 ml/ Insulin Human Regular 30 unit/ Total 

Parenteral Nutrition/Amino Acids/Dextrose/ Fat Emulsion Intravenous 1,920 ml @  

80 mls/hr TPN  CONT IV  Last administered on 6/12/20at 21:35;  Start 6/12/20 at 

22:00;  Stop 6/13/20 at 21:59;  Status DC


Furosemide (Lasix) 20 mg 1X  ONCE IVP  Last administered on 6/13/20at 06:26;  

Start 6/13/20 at 06:15;  Stop 6/13/20 at 06:16;  Status DC


Methylprednisolone Sodium Succinate (SOLU-Medrol 125MG VIAL) 125 mg 1X  ONCE IV 

Last administered on 6/13/20at 06:26;  Start 6/13/20 at 06:15;  Stop 6/13/20 at 

06:16;  Status DC


Albuterol/ Ipratropium (Duoneb) 3 ml Q4HRS NEB  Last administered on 6/26/20at 

08:56;  Start 6/13/20 at 08:00


Fentanyl Citrate 30 ml @ 0 mls/hr CONT  PRN IV SEE PROTOCOL Last administered on

6/25/20at 17:43;  Start 6/13/20 at 06:00


Propofol 100 ml @ 0 mls/hr CONT  PRN IV SEE PROTOCOL Last administered on 

6/20/20at 23:50;  Start 6/13/20 at 06:00


Fentanyl Citrate (Fentanyl 2ml Vial) 25 mcg PRN Q1HR  PRN IV SEE COMMENTS;  

Start 6/13/20 at 06:00


Fentanyl Citrate (Fentanyl 2ml Vial) 50 mcg PRN Q1HR  PRN IV SEE COMMENTS;  Sta

rt 6/13/20 at 06:00


Chlorhexidine Gluconate (Peridex) 15 ml BID MM ;  Start 6/13/20 at 09:00;  Stop 

6/13/20 at 07:58;  Status DC


Potassium Acetate 40 meq/Magnesium Sulfate 5 meq/ Multivitamins 10 ml/Chromium/ 

Copper/Manganese/ Seleni/Zn 1 ml/ Insulin Human Regular 30 unit/ Total 

Parenteral Nutrition/Amino Acids/Dextrose/ Fat Emulsion Intravenous 1,920 ml @  

80 mls/hr TPN  CONT IV  Last administered on 6/13/20at 21:19;  Start 6/13/20 at 

22:00;  Stop 6/14/20 at 21:59;  Status DC


Acetylcysteine (Mucomyst 20% Resp Treatment) 600 mg BID NEB  Last administered 

on 6/19/20at 09:33;  Start 6/13/20 at 21:00;  Stop 6/19/20 at 10:39;  Status DC


Magnesium Sulfate 100 ml @  25 mls/hr 1X  ONCE IV  Last administered on 

6/13/20at 15:48;  Start 6/13/20 at 15:45;  Stop 6/13/20 at 19:44;  Status DC


Potassium Acetate 40 meq/Magnesium Sulfate 5 meq/ Multivitamins 10 ml/Chromium/ 

Copper/Manganese/ Seleni/Zn 1 ml/ Insulin Human Regular 30 unit/ Total 

Parenteral Nutrition/Amino Acids/Dextrose/ Fat Emulsion Intravenous 1,920 ml @  

80 mls/hr TPN  CONT IV  Last administered on 6/14/20at 21:35;  Start 6/14/20 at 

22:00;  Stop 6/15/20 at 21:59;  Status DC


Potassium Chloride/Water 100 ml @  100 mls/hr Q1H IV  Last administered on 

6/15/20at 08:31;  Start 6/15/20 at 07:00;  Stop 6/15/20 at 08:59;  Status DC


Potassium Acetate 40 meq/Magnesium Sulfate 5 meq/ Multivitamins 10 ml/Chromium/ 

Copper/Manganese/ Seleni/Zn 1 ml/ Insulin Human Regular 30 unit/ Total 

Parenteral Nutrition/Amino Acids/Dextrose/ Fat Emulsion Intravenous 1,920 ml @  

80 mls/hr TPN  CONT IV  Last administered on 6/15/20at 21:54;  Start 6/15/20 at 

22:00;  Stop 6/16/20 at 19:34;  Status DC


Lidocaine HCl (Buffered Lidocaine 1%) 3 ml STK-MED ONCE .ROUTE ;  Start 6/15/20 

at 12:14;  Stop 6/15/20 at 12:14;  Status DC


Lidocaine HCl (Buffered Lidocaine 1%) 3 ml 1X  ONCE IJ  Last administered on 

6/15/20at 13:11;  Start 6/15/20 at 13:00;  Stop 6/15/20 at 13:01;  Status DC


Magnesium Sulfate 50 ml @ 25 mls/hr 1X  ONCE IV ;  Start 6/16/20 at 08:15;  Stop

6/16/20 at 10:14;  Status DC


Potassium Acetate 40 meq/Magnesium Sulfate 10 meq/ Multivitamins 10 ml/Chromium/

Copper/Manganese/ Seleni/Zn 1 ml/ Insulin Human Regular 20 unit/ Total 

Parenteral Nutrition/Amino Acids/Dextrose/ Fat Emulsion Intravenous 1,920 ml @  

80 mls/hr TPN  CONT IV  Last administered on 6/16/20at 21:32;  Start 6/16/20 at 

22:00;  Stop 6/17/20 at 21:59;  Status DC


Potassium Chloride/Water 100 ml @  100 mls/hr Q1H IV  Last administered on 

6/17/20at 09:12;  Start 6/17/20 at 08:00;  Stop 6/17/20 at 09:59;  Status DC


Alteplase, Recombinant (Cathflo For Central Catheter Clearance) 4 mg 1X  ONCE 

INT CAT ;  Start 6/17/20 at 09:15;  Stop 6/17/20 at 09:16;  Status UNV


Alteplase, Recombinant (Cathflo For Central Catheter Clearance) 4 mg 1X  ONCE 

INT CAT ;  Start 6/17/20 at 09:15;  Stop 6/17/20 at 09:16;  Status UNV


Alteplase, Recombinant (Cathflo For Central Catheter Clearance) 4 mg 1X  ONCE 

INT CAT ;  Start 6/17/20 at 09:15;  Stop 6/17/20 at 09:16;  Status UNV


Alteplase, Recombinant 4 mg/ Sodium Chloride 20 ml @ 20 mls/hr 1X  ONCE IV  Last

administered on 6/17/20at 10:10;  Start 6/17/20 at 10:00;  Stop 6/17/20 at 

10:59;  Status DC


Alteplase, Recombinant 4 mg/ Sodium Chloride 20 ml @ 20 mls/hr 1X  ONCE IV  Last

administered on 6/17/20at 10:09;  Start 6/17/20 at 10:00;  Stop 6/17/20 at 

10:59;  Status DC


Alteplase, Recombinant 4 mg/ Sodium Chloride 20 ml @ 20 mls/hr 1X  ONCE IV  Last

administered on 6/17/20at 10:09;  Start 6/17/20 at 10:00;  Stop 6/17/20 at 

10:59;  Status DC


Potassium Acetate 60 meq/Magnesium Sulfate 10 meq/ Multivitamins 10 ml/Chromium/

Copper/Manganese/ Seleni/Zn 1 ml/ Insulin Human Regular 20 unit/ Total 

Parenteral Nutrition/Amino Acids/Dextrose/ Fat Emulsion Intravenous 1,920 ml @  

80 mls/hr TPN  CONT IV  Last administered on 6/17/20at 21:55;  Start 6/17/20 at 

22:00;  Stop 6/18/20 at 21:59;  Status DC


Albumin Human 500 ml @  125 mls/hr 1X  ONCE IV  Last administered on 6/18/20at 

12:01;  Start 6/18/20 at 11:15;  Stop 6/18/20 at 15:14;  Status DC


Sodium Chloride 500 ml @  500 mls/hr 1X  ONCE IV  Last administered on 6/18/20at

13:50;  Start 6/18/20 at 11:15;  Stop 6/18/20 at 12:14;  Status DC


Potassium Acetate 60 meq/Magnesium Sulfate 14 meq/ Multivitamins 10 ml/Chromium/

Copper/Manganese/ Seleni/Zn 1 ml/ Insulin Human Regular 20 unit/ Total 

Parenteral Nutrition/Amino Acids/Dextrose/ Fat Emulsion Intravenous 1,920 ml @  

80 mls/hr TPN  CONT IV  Last administered on 6/18/20at 22:26;  Start 6/18/20 at 

22:00;  Stop 6/19/20 at 21:59;  Status DC


Ciprofloxacin/ Dextrose 200 ml @  200 mls/hr Q12HR IV  Last administered on 

6/25/20at 08:27;  Start 6/18/20 at 21:00;  Stop 6/25/20 at 08:56;  Status DC


Albumin Human 250 ml @  62.5 mls/hr 1X  ONCE IV  Last administered on 6/19/20at 

11:09;  Start 6/19/20 at 11:00;  Stop 6/19/20 at 14:59;  Status DC


Furosemide (Lasix) 20 mg 1X  ONCE IVP  Last administered on 6/19/20at 14:52;  

Start 6/19/20 at 10:45;  Stop 6/19/20 at 10:49;  Status DC


Potassium Acetate 60 meq/Magnesium Sulfate 14 meq/ Multivitamins 10 ml/Chromium/

Copper/Manganese/ Seleni/Zn 1 ml/ Insulin Human Regular 15 unit/ Total 

Parenteral Nutrition/Amino Acids/Dextrose/ Fat Emulsion Intravenous 1,920 ml @  

80 mls/hr TPN  CONT IV  Last administered on 6/19/20at 22:08;  Start 6/19/20 at 

22:00;  Stop 6/20/20 at 21:59;  Status DC


Potassium Acetate 60 meq/Magnesium Sulfate 14 meq/ Multivitamins 10 ml/Chromium/

Copper/Manganese/ Seleni/Zn 1 ml/ Insulin Human Regular 15 unit/ Total 

Parenteral Nutrition/Amino Acids/Dextrose/ Fat Emulsion Intravenous 1,920 ml @  

80 mls/hr TPN  CONT IV  Last administered on 6/20/20at 22:12;  Start 6/20/20 at 

22:00;  Stop 6/21/20 at 21:59;  Status DC


Potassium Acetate 60 meq/Magnesium Sulfate 14 meq/ Multivitamins 10 ml/Chromium/

Copper/Manganese/ Seleni/Zn 1 ml/ Insulin Human Regular 15 unit/ Total 

Parenteral Nutrition/Amino Acids/Dextrose/ Fat Emulsion Intravenous 1,920 ml @  

80 mls/hr TPN  CONT IV  Last administered on 6/21/20at 22:22;  Start 6/21/20 at 

22:00;  Stop 6/22/20 at 21:59;  Status DC


Furosemide (Lasix) 20 mg 1X  ONCE IVP  Last administered on 6/22/20at 11:07;  

Start 6/22/20 at 10:30;  Stop 6/22/20 at 10:34;  Status DC


Potassium Acetate 60 meq/Magnesium Sulfate 14 meq/ Multivitamins 10 ml/Chromium/

Copper/Manganese/ Seleni/Zn 1 ml/ Insulin Human Regular 15 unit/ Sodium Chloride

20 meq/Total Parenteral Nutrition/Amino Acids/Dextrose/ Fat Emulsion Intravenous

1,920 ml @  80 mls/hr TPN  CONT IV  Last administered on 6/22/20at 21:54;  Start

6/22/20 at 22:00;  Stop 6/23/20 at 21:59;  Status DC


Potassium Acetate 30 meq/Magnesium Sulfate 14 meq/ Multivitamins 10 ml/Chromium/

Copper/Manganese/ Seleni/Zn 1 ml/ Insulin Human Regular 15 unit/ Sodium Chloride

20 meq/Potassium Chloride 30 meq/ Total Parenteral Nutrition/Amino 

Acids/Dextrose/ Fat Emulsion Intravenous 1,920 ml @  80 mls/hr TPN  CONT IV  

Last administered on 6/23/20at 21:46;  Start 6/23/20 at 22:00;  Stop 6/24/20 at 

21:59;  Status DC


Sodium Chloride 80 meq/Potassium Chloride 30 meq/ Potassium Acetate 30 

meq/Magnesium Sulfate 14 meq/ Multivitamins 10 ml/Chromium/ Copper/Manganese/ 

Seleni/Zn 1 ml/ Insulin Human Regular 15 unit/ Total Parenteral Nutrition/Amino 

Acids/Dextrose/ Fat Emulsion Intravenous 1,920 ml @  80 mls/hr TPN  CONT IV  

Last administered on 6/24/20at 22:33;  Start 6/24/20 at 22:00;  Stop 6/25/20 at 

21:59;  Status DC


Furosemide (Lasix) 40 mg 1X  ONCE IVP  Last administered on 6/24/20at 16:27;  

Start 6/24/20 at 15:30;  Stop 6/24/20 at 15:33;  Status DC


Albumin Human 250 ml @  62.5 mls/hr 1X  ONCE IV  Last administered on 6/24/20at 

16:27;  Start 6/24/20 at 15:30;  Stop 6/24/20 at 19:29;  Status DC


Sodium Chloride 80 meq/Potassium Chloride 30 meq/ Potassium Acetate 30 

meq/Magnesium Sulfate 14 meq/ Multivitamins 10 ml/Chromium/ Copper/Manganese/ 

Seleni/Zn 1 ml/ Insulin Human Regular 15 unit/ Total Parenteral Nutrition/Amino 

Acids/Dextrose/ Fat Emulsion Intravenous 1,920 ml @  80 mls/hr TPN  CONT IV  

Last administered on 6/25/20at 22:25;  Start 6/25/20 at 22:00;  Stop 6/26/20 at 

21:59





Active Scripts


Active


Reported


Bisoprolol Fumarate 5 Mg Tablet 10 Mg PO DAILY


Vitals/I & O





Vital Sign - Last 24 Hours








 6/25/20 6/25/20 6/25/20 6/25/20





 12:00 12:05 16:00 16:03


 


Temp 98.8  99.8 





 98.8  99.8 


 


Pulse 125  123 


 


Resp 22  33 


 


B/P (MAP) 132/71 (91)  105/64 (78) 


 


Pulse Ox 99 97 99 98


 


O2 Delivery Tracheal Collar Tracheal Collar Tracheal Collar Tracheal Collar


 


O2 Flow Rate 6.0 8.0 6.0 8.0


 


    





    





 6/25/20 6/25/20 6/25/20 6/25/20





 17:43 20:00 20:00 20:02


 


Temp   99.9 





   99.9 


 


Pulse   120 


 


Resp   24 


 


B/P (MAP)   111/64 (80) 


 


Pulse Ox 97  98 92


 


O2 Delivery Tracheal Collar Trach Collar Tracheal Collar Tracheal Collar


 


O2 Flow Rate 6.0 6.0 6.0 8.0


 


    





    





 6/25/20 6/26/20 6/26/20 6/26/20





 23:45 00:00 03:54 04:00


 


Temp  99.3  99.1





  99.3  99.1


 


Pulse  112  112


 


Resp  26  28


 


B/P (MAP)  123/67 (85)  91/57 (68)


 


Pulse Ox 100 98 96 100


 


O2 Delivery Tracheal Collar Tracheal Collar Tracheal Collar Tracheal Collar


 


O2 Flow Rate 8.0 6.0 8.0 6.0


 


    





    





 6/26/20 6/26/20 6/26/20 





 08:00 08:00 08:58 


 


Temp  99.0  





  99.0  


 


Pulse  140  


 


Resp  28  


 


B/P (MAP)  113/91 (98)  


 


Pulse Ox  100 96 


 


O2 Delivery Trach Collar Tracheal Collar Tracheal Collar 


 


O2 Flow Rate 6.0  8.0 














Intake and Output   


 


 6/25/20 6/25/20 6/26/20





 15:00 23:00 07:00


 


Intake Total 300 ml 1016.8 ml 901 ml


 


Output Total 445 ml 370 ml 710 ml


 


Balance -145 ml 646.8 ml 191 ml











Hemodynamically unstable?:  No


Is patient in severe pain?:  No


Is NPO status required?:  No











HIRAM BARRERA MD             Jun 26, 2020 11:23

## 2020-06-26 NOTE — NUR
Pharmacy TPN Dosing Note



S: JESENIA BEAN is a 49 year old F Currently receiving Central Continuous TPN started 
03/18/20



B:Pertinent PMH: Necrotizing pancreatitis

Height: 5 feet, 8 inches

Weight: 87.5 kg



Current diet: NPO 



LABS:

Sodium:    137 

Potassium: 4.1 

Chloride:  101 

Calcium:   10.4 

Corrected Calcium: 12.72 

Magnesium: 2.1 

CO2:       36 

SCr:       0.6 

Glucose:   125-138 

Albumin:   1.1 

AST:       19 

ALT:       14 



TPN FORMULA:

TPN TYPE:  Central Continuous

AMINO ACIDS:         80 gm

DEXTROSE:            250 gm

LIPIDS:              20 gm

SODIUM CHLORIDE:     80 mEq

POTASSIUM CHLORIDE:  30 mEq

POTASSIUM ACETATE:   30 mEq

MAGNESIUM:           14 mEq

INSULIN:             15 units

MULTIPLE VITAMIN:    10 ml

TRACE ELEMENTS:      1 ml(s)



TPN PLAN:  

Continue same TPN





R: Continue same TPN formula.

Will monitor electrolytes, glucose, and tolerance to TPN.



 LORENZO LESLIE RPH, 06/26/20 [id number]

## 2020-06-26 NOTE — PDOC
Objective:


Objective:


D/w nurse - had some shortness of breath this morning, better now.


Vital Signs:





                                   Vital Signs








  Date Time  Temp Pulse Resp B/P (MAP) Pulse Ox O2 Delivery O2 Flow Rate FiO2


 


6/26/20 08:58     96 Tracheal Collar 8.0 


 


6/26/20 08:00 99.0 140 28 113/91 (98)    





 99.0       








Labs:





Laboratory Tests








Test


 6/25/20


12:50 6/25/20


17:39 6/26/20


00:23 6/26/20


05:12


 


Glucose (Fingerstick) 150 mg/dL  139 mg/dL  138 mg/dL  125 mg/dL 


 


Test


 6/26/20


05:15 


 


 





 


White Blood Count 10.9 x10^3/uL    


 


Red Blood Count 3.01 x10^6/uL    


 


Hemoglobin 8.6 g/dL    


 


Hematocrit 25.5 %    


 


Mean Corpuscular Volume 85 fL    


 


Mean Corpuscular Hemoglobin 29 pg    


 


Mean Corpuscular Hemoglobin


Concent 34 g/dL 


 


 


 





 


Red Cell Distribution Width 18.1 %    


 


Platelet Count 380 x10^3/uL    


 


Neutrophils (%) (Auto) 78 %    


 


Lymphocytes (%) (Auto) 12 %    


 


Monocytes (%) (Auto) 7 %    


 


Eosinophils (%) (Auto) 2 %    


 


Basophils (%) (Auto) 0 %    


 


Neutrophils # (Auto) 8.5 x10^3/uL    


 


Lymphocytes # (Auto) 1.3 x10^3/uL    


 


Monocytes # (Auto) 0.8 x10^3/uL    


 


Eosinophils # (Auto) 0.2 x10^3/uL    


 


Basophils # (Auto) 0.0 x10^3/uL    


 


Sodium Level 137 mmol/L    


 


Potassium Level 4.1 mmol/L    


 


Chloride Level 101 mmol/L    


 


Carbon Dioxide Level 36 mmol/L    


 


Anion Gap 0    


 


Blood Urea Nitrogen 15 mg/dL    


 


Creatinine 0.6 mg/dL    


 


Estimated GFR


(Cockcroft-Gault) 106.3 


 


 


 





 


BUN/Creatinine Ratio 25    


 


Glucose Level 126 mg/dL    


 


Calcium Level 10.4 mg/dL    


 


Phosphorus Level 4.4 mg/dL    


 


Magnesium Level 2.1 mg/dL    


 


Total Bilirubin 0.5 mg/dL    


 


Aspartate Amino Transf


(AST/SGOT) 19 U/L 


 


 


 





 


Alanine Aminotransferase


(ALT/SGPT) 14 U/L 


 


 


 





 


Alkaline Phosphatase 161 U/L    


 


Total Protein 4.6 g/dL    


 


Albumin 1.1 g/dL    


 


Albumin/Globulin Ratio 0.3    








Imaging:


CXR 6/25


Impression: 


1.  Moderate right pleural effusion with increased adjacent opacities.


2.  Small left pleural effusion with adjacent opacity, unchanged. Stable left 

pigtail pleural drain.





PE:





GEN: chronically ill


LUNGS: trach collar


HEART: tachycardic


ABD: NG to suction - bilious output


NEURO/PSYCH: resting, did not awaken





A/P:


Complicated pancreatitis





--


Stable GI-wise, possibly to OR next week.





Justicifation of Admission Dx:


Justifications for Admission:


Justification of Admission Dx:  Yes











RAGHAVENDRA MURCIA         Jun 26, 2020 11:00

## 2020-06-26 NOTE — NUR
SS following up with discharge planning. SS reviewed pt chart and discussed with pt RN. No 
new changes today. Pt on TPN and trach collar. Pt has drains x3 and chest tube. Pt max 
dependent with PT/OT and staff. Tentative surgery scheduled for next week. SS will continue 
to follow for discharge planning.

## 2020-06-26 NOTE — PDOC
SURGICAL PROGRESS NOTE


Subjective


coughing


Vital Signs





Vital Signs








  Date Time  Temp Pulse Resp B/P (MAP) Pulse Ox O2 Delivery O2 Flow Rate FiO2


 


6/26/20 04:00 99.1 112 28 91/57 (68) 100 Tracheal Collar 6.0 





 99.1       








I&O











Intake and Output 


 


 6/26/20





 06:59


 


Intake Total 2217.8 ml


 


Output Total 1575 ml


 


Balance 642.8 ml


 


 


 


IV Total 2217.8 ml


 


Output Urine Total 1360 ml


 


Gastric Drainage Total 100 ml


 


Chest Tube Drainage Total 0 ml


 


Drainage Total 115 ml








General:  Alert, Cooperative


HEENT:  Other (trach)


Abdomen:  Soft, Other (drains in place)


Labs





Laboratory Tests








Test


 6/24/20


18:04 6/25/20


01:01 6/25/20


06:16 6/25/20


12:50


 


Glucose (Fingerstick)


 172 mg/dL


(70-99) 134 mg/dL


(70-99) 123 mg/dL


(70-99) 150 mg/dL


(70-99)


 


Test


 6/25/20


17:39 6/26/20


00:23 6/26/20


05:12 6/26/20


05:15


 


Glucose (Fingerstick)


 139 mg/dL


(70-99) 138 mg/dL


(70-99) 125 mg/dL


(70-99) 





 


White Blood Count


 


 


 


 10.9 x10^3/uL


(4.0-11.0)


 


Red Blood Count


 


 


 


 3.01 x10^6/uL


(3.50-5.40)


 


Hemoglobin


 


 


 


 8.6 g/dL


(12.0-15.5)


 


Hematocrit


 


 


 


 25.5 %


(36.0-47.0)


 


Mean Corpuscular Volume    85 fL () 


 


Mean Corpuscular Hemoglobin    29 pg (25-35) 


 


Mean Corpuscular Hemoglobin


Concent 


 


 


 34 g/dL


(31-37)


 


Red Cell Distribution Width


 


 


 


 18.1 %


(11.5-14.5)


 


Platelet Count


 


 


 


 380 x10^3/uL


(140-400)


 


Neutrophils (%) (Auto)    78 % (31-73) 


 


Lymphocytes (%) (Auto)    12 % (24-48) 


 


Monocytes (%) (Auto)    7 % (0-9) 


 


Eosinophils (%) (Auto)    2 % (0-3) 


 


Basophils (%) (Auto)    0 % (0-3) 


 


Neutrophils # (Auto)


 


 


 


 8.5 x10^3/uL


(1.8-7.7)


 


Lymphocytes # (Auto)


 


 


 


 1.3 x10^3/uL


(1.0-4.8)


 


Monocytes # (Auto)


 


 


 


 0.8 x10^3/uL


(0.0-1.1)


 


Eosinophils # (Auto)


 


 


 


 0.2 x10^3/uL


(0.0-0.7)


 


Basophils # (Auto)


 


 


 


 0.0 x10^3/uL


(0.0-0.2)


 


Sodium Level


 


 


 


 137 mmol/L


(136-145)


 


Potassium Level


 


 


 


 4.1 mmol/L


(3.5-5.1)


 


Chloride Level


 


 


 


 101 mmol/L


()


 


Carbon Dioxide Level


 


 


 


 36 mmol/L


(21-32)


 


Anion Gap    0 (6-14) 


 


Blood Urea Nitrogen


 


 


 


 15 mg/dL


(7-20)


 


Creatinine


 


 


 


 0.6 mg/dL


(0.6-1.0)


 


Estimated GFR


(Cockcroft-Gault) 


 


 


 106.3 





 


BUN/Creatinine Ratio    25 (6-20) 


 


Glucose Level


 


 


 


 126 mg/dL


(70-99)


 


Calcium Level


 


 


 


 10.4 mg/dL


(8.5-10.1)


 


Phosphorus Level


 


 


 


 4.4 mg/dL


(2.6-4.7)


 


Magnesium Level


 


 


 


 2.1 mg/dL


(1.8-2.4)


 


Total Bilirubin


 


 


 


 0.5 mg/dL


(0.2-1.0)


 


Aspartate Amino Transf


(AST/SGOT) 


 


 


 19 U/L (15-37) 





 


Alanine Aminotransferase


(ALT/SGPT) 


 


 


 14 U/L (14-59) 





 


Alkaline Phosphatase


 


 


 


 161 U/L


()


 


Total Protein


 


 


 


 4.6 g/dL


(6.4-8.2)


 


Albumin


 


 


 


 1.1 g/dL


(3.4-5.0)


 


Albumin/Globulin Ratio    0.3 (1.0-1.7) 








Laboratory Tests








Test


 6/25/20


12:50 6/25/20


17:39 6/26/20


00:23 6/26/20


05:12


 


Glucose (Fingerstick)


 150 mg/dL


(70-99) 139 mg/dL


(70-99) 138 mg/dL


(70-99) 125 mg/dL


(70-99)


 


Test


 6/26/20


05:15 


 


 





 


White Blood Count


 10.9 x10^3/uL


(4.0-11.0) 


 


 





 


Red Blood Count


 3.01 x10^6/uL


(3.50-5.40) 


 


 





 


Hemoglobin


 8.6 g/dL


(12.0-15.5) 


 


 





 


Hematocrit


 25.5 %


(36.0-47.0) 


 


 





 


Mean Corpuscular Volume 85 fL ()    


 


Mean Corpuscular Hemoglobin 29 pg (25-35)    


 


Mean Corpuscular Hemoglobin


Concent 34 g/dL


(31-37) 


 


 





 


Red Cell Distribution Width


 18.1 %


(11.5-14.5) 


 


 





 


Platelet Count


 380 x10^3/uL


(140-400) 


 


 





 


Neutrophils (%) (Auto) 78 % (31-73)    


 


Lymphocytes (%) (Auto) 12 % (24-48)    


 


Monocytes (%) (Auto) 7 % (0-9)    


 


Eosinophils (%) (Auto) 2 % (0-3)    


 


Basophils (%) (Auto) 0 % (0-3)    


 


Neutrophils # (Auto)


 8.5 x10^3/uL


(1.8-7.7) 


 


 





 


Lymphocytes # (Auto)


 1.3 x10^3/uL


(1.0-4.8) 


 


 





 


Monocytes # (Auto)


 0.8 x10^3/uL


(0.0-1.1) 


 


 





 


Eosinophils # (Auto)


 0.2 x10^3/uL


(0.0-0.7) 


 


 





 


Basophils # (Auto)


 0.0 x10^3/uL


(0.0-0.2) 


 


 





 


Sodium Level


 137 mmol/L


(136-145) 


 


 





 


Potassium Level


 4.1 mmol/L


(3.5-5.1) 


 


 





 


Chloride Level


 101 mmol/L


() 


 


 





 


Carbon Dioxide Level


 36 mmol/L


(21-32) 


 


 





 


Anion Gap 0 (6-14)    


 


Blood Urea Nitrogen


 15 mg/dL


(7-20) 


 


 





 


Creatinine


 0.6 mg/dL


(0.6-1.0) 


 


 





 


Estimated GFR


(Cockcroft-Gault) 106.3 


 


 


 





 


BUN/Creatinine Ratio 25 (6-20)    


 


Glucose Level


 126 mg/dL


(70-99) 


 


 





 


Calcium Level


 10.4 mg/dL


(8.5-10.1) 


 


 





 


Phosphorus Level


 4.4 mg/dL


(2.6-4.7) 


 


 





 


Magnesium Level


 2.1 mg/dL


(1.8-2.4) 


 


 





 


Total Bilirubin


 0.5 mg/dL


(0.2-1.0) 


 


 





 


Aspartate Amino Transf


(AST/SGOT) 19 U/L (15-37) 


 


 


 





 


Alanine Aminotransferase


(ALT/SGPT) 14 U/L (14-59) 


 


 


 





 


Alkaline Phosphatase


 161 U/L


() 


 


 





 


Total Protein


 4.6 g/dL


(6.4-8.2) 


 


 





 


Albumin


 1.1 g/dL


(3.4-5.0) 


 


 





 


Albumin/Globulin Ratio 0.3 (1.0-1.7)    








Problem List


Problems


Medical Problems:


(1) Acute pancreatitis


Status: Acute  





(2) Cholelithiasis


Status: Acute  








Assessment/Plan


tentative plans next week for surgery with Dr Flores





Justicifation of Admission Dx:


Justifications for Admission:


Justification of Admission Dx:  Yes











SAURAV NASH            Jun 26, 2020 08:46

## 2020-06-26 NOTE — PDOC
Infectious Disease Note


Subjective


Subjective


Patient is awake says she is feeling better





ROS


ROS


No nausea vomiting diarrhea





Vital Sign


Vital Signs





Vital Signs








  Date Time  Temp Pulse Resp B/P (MAP) Pulse Ox O2 Delivery O2 Flow Rate FiO2


 


6/26/20 04:00 99.1 112 28 91/57 (68) 100 Tracheal Collar 6.0 





 99.1       











Physical Exam


PHYSICAL EXAM


GENERAL: Propped up in bed, resting quietly  watching tv


HEENT: 8 liters nc


NECK:  Tracheostomy 


LUNGS: Diminished aeration bases, no accessory muscle use - CT on left with 

clear fluid


HEART:  S1, S2,regular 


ABDOMEN: Mild distention, bowel sounds present, soft, grimaces to palpation, 

drains x 3


: [place] ( 6/7)


EXTREMITIES: + edema BLE


SKIN: no signs of gen rash 


LUE-PICC  without signs of complications





Labs


Lab





Laboratory Tests








Test


 6/25/20


12:50 6/25/20


17:39 6/26/20


00:23 6/26/20


05:12


 


Glucose (Fingerstick)


 150 mg/dL


(70-99) 139 mg/dL


(70-99) 138 mg/dL


(70-99) 125 mg/dL


(70-99)


 


Test


 6/26/20


05:15 


 


 





 


White Blood Count


 10.9 x10^3/uL


(4.0-11.0) 


 


 





 


Red Blood Count


 3.01 x10^6/uL


(3.50-5.40) 


 


 





 


Hemoglobin


 8.6 g/dL


(12.0-15.5) 


 


 





 


Hematocrit


 25.5 %


(36.0-47.0) 


 


 





 


Mean Corpuscular Volume 85 fL ()    


 


Mean Corpuscular Hemoglobin 29 pg (25-35)    


 


Mean Corpuscular Hemoglobin


Concent 34 g/dL


(31-37) 


 


 





 


Red Cell Distribution Width


 18.1 %


(11.5-14.5) 


 


 





 


Platelet Count


 380 x10^3/uL


(140-400) 


 


 





 


Neutrophils (%) (Auto) 78 % (31-73)    


 


Lymphocytes (%) (Auto) 12 % (24-48)    


 


Monocytes (%) (Auto) 7 % (0-9)    


 


Eosinophils (%) (Auto) 2 % (0-3)    


 


Basophils (%) (Auto) 0 % (0-3)    


 


Neutrophils # (Auto)


 8.5 x10^3/uL


(1.8-7.7) 


 


 





 


Lymphocytes # (Auto)


 1.3 x10^3/uL


(1.0-4.8) 


 


 





 


Monocytes # (Auto)


 0.8 x10^3/uL


(0.0-1.1) 


 


 





 


Eosinophils # (Auto)


 0.2 x10^3/uL


(0.0-0.7) 


 


 





 


Basophils # (Auto)


 0.0 x10^3/uL


(0.0-0.2) 


 


 





 


Sodium Level


 137 mmol/L


(136-145) 


 


 





 


Potassium Level


 4.1 mmol/L


(3.5-5.1) 


 


 





 


Chloride Level


 101 mmol/L


() 


 


 





 


Carbon Dioxide Level


 36 mmol/L


(21-32) 


 


 





 


Anion Gap 0 (6-14)    


 


Blood Urea Nitrogen


 15 mg/dL


(7-20) 


 


 





 


Creatinine


 0.6 mg/dL


(0.6-1.0) 


 


 





 


Estimated GFR


(Cockcroft-Gault) 106.3 


 


 


 





 


BUN/Creatinine Ratio 25 (6-20)    


 


Glucose Level


 126 mg/dL


(70-99) 


 


 





 


Calcium Level


 10.4 mg/dL


(8.5-10.1) 


 


 





 


Phosphorus Level


 4.4 mg/dL


(2.6-4.7) 


 


 





 


Magnesium Level


 2.1 mg/dL


(1.8-2.4) 


 


 





 


Total Bilirubin


 0.5 mg/dL


(0.2-1.0) 


 


 





 


Aspartate Amino Transf


(AST/SGOT) 19 U/L (15-37) 


 


 


 





 


Alanine Aminotransferase


(ALT/SGPT) 14 U/L (14-59) 


 


 


 





 


Alkaline Phosphatase


 161 U/L


() 


 


 





 


Total Protein


 4.6 g/dL


(6.4-8.2) 


 


 





 


Albumin


 1.1 g/dL


(3.4-5.0) 


 


 





 


Albumin/Globulin Ratio 0.3 (1.0-1.7)    








Micro








Objective


Assessment


Fever intermittent could be from underlying pancreatitis vs aspiration 


? Ileus with vomiting


Abd distention - U/S and CT reviewed s/p 0.4 L of opaque, debris-containing 

ascites was removed 5/6


Acute pancreatitis with persistent necrosis


  - 4/27 status post ROBERT drain placement + C paropsilosis; 5/6 + yeast & high 

amylase; s/p additional drains on 5/8


Anemia - S/p PRBCs


Cholelithiasis with thickening of the gallbladder wall.


Leucocytosis improved 


JED, hyperkalemia, Metabolic acidosis off dialysis


Acute hypoxic resp failure ,bilateral pleural effusion and atelectasis


hypocalcemia 


Prediabetes


HTN


s/p trach





Plan


Plan of Care


, observe off antibiotics


Monitor labs/temp


General surgery following


Monitor drain output


Maintain aspiration precaution


Supportive care


Contact islation for CRE/MDRO











LINN FRANZ MD               Jun 26, 2020 07:40

## 2020-06-26 NOTE — PDOC
PULMONARY PROGRESS NOTES


Subjective


Sleeping laying in bed


Patient intubated on 3/23 , s/p trach 4/6, 


transferred back to ICU 6/5 for syncope with collapse


developed anemia, blood drainage from RLQ abdomen drain site, and surrounding 

firmness  / developed septic shock 6/7 from abdomen source, required levo 6/7


s/p 3 new drains 6/7 with brown color drainage


s/p left chest tube, draining


Vitals





Vital Signs








  Date Time  Temp Pulse Resp B/P (MAP) Pulse Ox O2 Delivery O2 Flow Rate FiO2


 


6/26/20 08:58     96 Tracheal Collar 8.0 


 


6/26/20 04:00 99.1 112 28 91/57 (68)    





 99.1       








Comments


awake,no soa


General:  Alert, No acute distress


HEENT:  Other (nc at perrl   nose clear    neck  trach site ok   no lab  no 

thyromegaly)


Lungs:  Other (diminshed in bases, Rhonci in LLL)


Cardiovascular:  S1, S2


Abdomen:  Soft, Non-tender, Other (bleeding from Sx. site RLQ and firmness)


Extremities:  Other (+1 BLE edema)


Skin:  Warm, Dry


Labs





Laboratory Tests








Test


 6/24/20


18:04 6/25/20


01:01 6/25/20


06:16 6/25/20


12:50


 


Glucose (Fingerstick)


 172 mg/dL


(70-99) 134 mg/dL


(70-99) 123 mg/dL


(70-99) 150 mg/dL


(70-99)


 


Test


 6/25/20


17:39 6/26/20


00:23 6/26/20


05:12 6/26/20


05:15


 


Glucose (Fingerstick)


 139 mg/dL


(70-99) 138 mg/dL


(70-99) 125 mg/dL


(70-99) 





 


White Blood Count


 


 


 


 10.9 x10^3/uL


(4.0-11.0)


 


Red Blood Count


 


 


 


 3.01 x10^6/uL


(3.50-5.40)


 


Hemoglobin


 


 


 


 8.6 g/dL


(12.0-15.5)


 


Hematocrit


 


 


 


 25.5 %


(36.0-47.0)


 


Mean Corpuscular Volume    85 fL () 


 


Mean Corpuscular Hemoglobin    29 pg (25-35) 


 


Mean Corpuscular Hemoglobin


Concent 


 


 


 34 g/dL


(31-37)


 


Red Cell Distribution Width


 


 


 


 18.1 %


(11.5-14.5)


 


Platelet Count


 


 


 


 380 x10^3/uL


(140-400)


 


Neutrophils (%) (Auto)    78 % (31-73) 


 


Lymphocytes (%) (Auto)    12 % (24-48) 


 


Monocytes (%) (Auto)    7 % (0-9) 


 


Eosinophils (%) (Auto)    2 % (0-3) 


 


Basophils (%) (Auto)    0 % (0-3) 


 


Neutrophils # (Auto)


 


 


 


 8.5 x10^3/uL


(1.8-7.7)


 


Lymphocytes # (Auto)


 


 


 


 1.3 x10^3/uL


(1.0-4.8)


 


Monocytes # (Auto)


 


 


 


 0.8 x10^3/uL


(0.0-1.1)


 


Eosinophils # (Auto)


 


 


 


 0.2 x10^3/uL


(0.0-0.7)


 


Basophils # (Auto)


 


 


 


 0.0 x10^3/uL


(0.0-0.2)


 


Sodium Level


 


 


 


 137 mmol/L


(136-145)


 


Potassium Level


 


 


 


 4.1 mmol/L


(3.5-5.1)


 


Chloride Level


 


 


 


 101 mmol/L


()


 


Carbon Dioxide Level


 


 


 


 36 mmol/L


(21-32)


 


Anion Gap    0 (6-14) 


 


Blood Urea Nitrogen


 


 


 


 15 mg/dL


(7-20)


 


Creatinine


 


 


 


 0.6 mg/dL


(0.6-1.0)


 


Estimated GFR


(Cockcroft-Gault) 


 


 


 106.3 





 


BUN/Creatinine Ratio    25 (6-20) 


 


Glucose Level


 


 


 


 126 mg/dL


(70-99)


 


Calcium Level


 


 


 


 10.4 mg/dL


(8.5-10.1)


 


Phosphorus Level


 


 


 


 4.4 mg/dL


(2.6-4.7)


 


Magnesium Level


 


 


 


 2.1 mg/dL


(1.8-2.4)


 


Total Bilirubin


 


 


 


 0.5 mg/dL


(0.2-1.0)


 


Aspartate Amino Transf


(AST/SGOT) 


 


 


 19 U/L (15-37) 





 


Alanine Aminotransferase


(ALT/SGPT) 


 


 


 14 U/L (14-59) 





 


Alkaline Phosphatase


 


 


 


 161 U/L


()


 


Total Protein


 


 


 


 4.6 g/dL


(6.4-8.2)


 


Albumin


 


 


 


 1.1 g/dL


(3.4-5.0)


 


Albumin/Globulin Ratio    0.3 (1.0-1.7) 








Laboratory Tests








Test


 6/25/20


12:50 6/25/20


17:39 6/26/20


00:23 6/26/20


05:12


 


Glucose (Fingerstick)


 150 mg/dL


(70-99) 139 mg/dL


(70-99) 138 mg/dL


(70-99) 125 mg/dL


(70-99)


 


Test


 6/26/20


05:15 


 


 





 


White Blood Count


 10.9 x10^3/uL


(4.0-11.0) 


 


 





 


Red Blood Count


 3.01 x10^6/uL


(3.50-5.40) 


 


 





 


Hemoglobin


 8.6 g/dL


(12.0-15.5) 


 


 





 


Hematocrit


 25.5 %


(36.0-47.0) 


 


 





 


Mean Corpuscular Volume 85 fL ()    


 


Mean Corpuscular Hemoglobin 29 pg (25-35)    


 


Mean Corpuscular Hemoglobin


Concent 34 g/dL


(31-37) 


 


 





 


Red Cell Distribution Width


 18.1 %


(11.5-14.5) 


 


 





 


Platelet Count


 380 x10^3/uL


(140-400) 


 


 





 


Neutrophils (%) (Auto) 78 % (31-73)    


 


Lymphocytes (%) (Auto) 12 % (24-48)    


 


Monocytes (%) (Auto) 7 % (0-9)    


 


Eosinophils (%) (Auto) 2 % (0-3)    


 


Basophils (%) (Auto) 0 % (0-3)    


 


Neutrophils # (Auto)


 8.5 x10^3/uL


(1.8-7.7) 


 


 





 


Lymphocytes # (Auto)


 1.3 x10^3/uL


(1.0-4.8) 


 


 





 


Monocytes # (Auto)


 0.8 x10^3/uL


(0.0-1.1) 


 


 





 


Eosinophils # (Auto)


 0.2 x10^3/uL


(0.0-0.7) 


 


 





 


Basophils # (Auto)


 0.0 x10^3/uL


(0.0-0.2) 


 


 





 


Sodium Level


 137 mmol/L


(136-145) 


 


 





 


Potassium Level


 4.1 mmol/L


(3.5-5.1) 


 


 





 


Chloride Level


 101 mmol/L


() 


 


 





 


Carbon Dioxide Level


 36 mmol/L


(21-32) 


 


 





 


Anion Gap 0 (6-14)    


 


Blood Urea Nitrogen


 15 mg/dL


(7-20) 


 


 





 


Creatinine


 0.6 mg/dL


(0.6-1.0) 


 


 





 


Estimated GFR


(Cockcroft-Gault) 106.3 


 


 


 





 


BUN/Creatinine Ratio 25 (6-20)    


 


Glucose Level


 126 mg/dL


(70-99) 


 


 





 


Calcium Level


 10.4 mg/dL


(8.5-10.1) 


 


 





 


Phosphorus Level


 4.4 mg/dL


(2.6-4.7) 


 


 





 


Magnesium Level


 2.1 mg/dL


(1.8-2.4) 


 


 





 


Total Bilirubin


 0.5 mg/dL


(0.2-1.0) 


 


 





 


Aspartate Amino Transf


(AST/SGOT) 19 U/L (15-37) 


 


 


 





 


Alanine Aminotransferase


(ALT/SGPT) 14 U/L (14-59) 


 


 


 





 


Alkaline Phosphatase


 161 U/L


() 


 


 





 


Total Protein


 4.6 g/dL


(6.4-8.2) 


 


 





 


Albumin


 1.1 g/dL


(3.4-5.0) 


 


 





 


Albumin/Globulin Ratio 0.3 (1.0-1.7)    








Medications





Active Scripts








 Medications  Dose


 Route/Sig


 Max Daily Dose Days Date Category


 


 Bisoprolol


 Fumarate 5 Mg


 Tablet  10 Mg


 PO DAILY


   3/16/20 Reported








Comments


CXR 6/22


poor insp effort/ increase effusion


 











ct abdomen /pelvis 6/6


1. Removal of the percutaneous pigtail drainage catheters since the prior 


exam. Sequela of pancreatitis with extensive pseudocysts again 


demonstrated, the right-sided collections are slightly larger since the 


prior exam, the left-sided collections are stable. See above.


2. Moderate to large left pleural effusion with atelectasis and collapse 


of most of the left lower lobe, stable. Small right pleural effusion is 


stable.


3. Gallstone.





ct chest 6/15 reviewed








 GRAM NEG COCCOBACILLI:MANY


        SQUAMOUS EPI CELL:RARE


        PMN (WBCs):[id number]


        Unless otherwise specified, Testing Performed by:


        [address] [address]


        For Inquires, the Physician may contact the Microbiology


        department at [phone]





  RESPIRATORY CULTURE  Final  


        Final





        MANY GRAM NEGATIVE RODS on 06/15/20 at 110


        FINAL ID= [PSEUDOMONAS AERUGINOSA]


        MICRO CHARGES


        PSEUDOMONAS AERUGINOSA





  ANTIMICROBIAL SUSCEPTIBILITY  Final  


        Comment





        NEG ANSON 56


        PSEUDOMONAS AERUGINOSA


        ANTIBIOTIC                        RESULT          INTERPRETATION


        AMIKACIN                          <=16                  S


        AZTREONAM                         <=4                   S


        CEFTAZIDIME                       <=1                   S


        CIPROFLOXACIN                     <=0.25                S


        CEFEPIME                          <=2                   S


        CEFTAZIDIME/AVIBACTAM             <=4                   S


        GENTAMICIN                        <=2                   S


        LEVOFLOXACIN                      <=0.5                 S





Impression


.


IMPRESSION:


1.  Acute hypoxemic respiratory failure secondary to ARDS status post trach, 

developed anemia 6/7, blood drainage from RLQ abdomen drain site, and 

surrounding firmness  / developed septic shock 6/7 from abdomen source, required

levo 6/7


s/p 3 new drains 6/7 with brown color drainage,  on/off vent , on TS now


2.  Gallstone pancreatitis, now with ongoing bleeding from prior drain. Anemic. 

s/p Tx multiple units over several days


3.  septic shock/sepsis, recurrent 6/7, source abdomen. , off levo now, 


4.  Acute kidney injury-, Off HD--renal function decling. suspect JED on CKD due

to hypotension , improved now


5.  Acute gallstone pancreatitis.


6.  Hypoalbuminemia.


7.  Moderate persistent effusions, s/p left thora  5/12, reaccumulation of left 

effusion. O2 requirement not changed. 


8.  Fever- ,hypotension. suspect recurrent sepsis/ likely pancreatic source.  

Per ID, per surgery--


9.  Chronic anemia-- ongoing / s/p PRBC


10. Covid 19 testing negative


11. Moderate to large ascites-S/P paracentisis


12.S/P paracentisis with 4 liters removed on 4/15/20


13. S/P IR drain placement on 5/8/2020, removal, re inserted 6/7


14. Depression/Anxiety 


15. Increase effusion, ? loculated/ s/p chest tube.. drainage slowing down. 200 

cc /24 hr





Plan


.


We will continue current support


Tentatively scheduled for surgery next week


1. TS as tolerated  titrate fio2 to keep sat 94%, off  vent 


2. Follow all cultures/ PSA from pelvic drains. Abx per ID/  thoracentesis 

result transudate, await c/s,


3. Follow surgery recs-- Acute pancreatitis with persistent necrosis ---- 4/27 

status post ROBERT drain placement + C paropsilosis; 5/6 + yeast & high amylase; s/p

additional drains on 5/8, removal of drains and now increase pseudocyst and 

increase fluid collection. likely infected fluid/ sepsis. on BS abx.follow 

surgery rec, planning surgical intervention next few weeks


4. Follow ID recs for ABX----


5. Follow nephrology recs -----


6. Continue TPN 


7. monitor HGB,  4 units since 6/6--monitor HGB transfuse if less than 8.5 


8 s/p  thoracentesis, 5/12, 3 litres removed, s/p ct on 6/15. ct drainage, 180 

cc over the past 24 hrs,  suction -40, am cxr, flushed chest tube 6/22


9. Pt remains critically ill from severe necrotic pancreatis. Even with surgery 

prognosis grim. Surgery informed family.


10. lasix/albumin prn


11.  sputum gram neg cocobacilli. pan sensitive





     


DVT/GI PPX: protonix---


PT/OT


D/W RN, RT











STEVE MIRANDA MD              Jun 26, 2020 09:30

## 2020-06-27 VITALS — SYSTOLIC BLOOD PRESSURE: 106 MMHG | DIASTOLIC BLOOD PRESSURE: 65 MMHG

## 2020-06-27 VITALS — SYSTOLIC BLOOD PRESSURE: 110 MMHG | DIASTOLIC BLOOD PRESSURE: 64 MMHG

## 2020-06-27 VITALS — DIASTOLIC BLOOD PRESSURE: 84 MMHG | SYSTOLIC BLOOD PRESSURE: 118 MMHG

## 2020-06-27 VITALS — SYSTOLIC BLOOD PRESSURE: 107 MMHG | DIASTOLIC BLOOD PRESSURE: 71 MMHG

## 2020-06-27 VITALS — DIASTOLIC BLOOD PRESSURE: 67 MMHG | SYSTOLIC BLOOD PRESSURE: 122 MMHG

## 2020-06-27 VITALS — DIASTOLIC BLOOD PRESSURE: 57 MMHG | SYSTOLIC BLOOD PRESSURE: 99 MMHG

## 2020-06-27 LAB
BASE EXCESS ABG: 8 MMOL/L (ref -3–3)
HCO3 BLDA-SCNC: 34 MMOL/L (ref 21–28)
INSPIRATION SETTING TIME VENT: 33
PCO2 BLDA: 54 MMHG (ref 35–46)
PO2 BLDA: 79 MMHG (ref 75–108)
SAO2 % BLDA: 95 % (ref 92–99)

## 2020-06-27 RX ADMIN — IPRATROPIUM BROMIDE AND ALBUTEROL SULFATE SCH ML: .5; 3 SOLUTION RESPIRATORY (INHALATION) at 04:00

## 2020-06-27 RX ADMIN — INSULIN LISPRO SCH UNITS: 100 INJECTION, SOLUTION INTRAVENOUS; SUBCUTANEOUS at 00:00

## 2020-06-27 RX ADMIN — IPRATROPIUM BROMIDE AND ALBUTEROL SULFATE SCH ML: .5; 3 SOLUTION RESPIRATORY (INHALATION) at 08:50

## 2020-06-27 RX ADMIN — INSULIN LISPRO SCH UNITS: 100 INJECTION, SOLUTION INTRAVENOUS; SUBCUTANEOUS at 12:00

## 2020-06-27 RX ADMIN — PROCHLORPERAZINE EDISYLATE PRN MG: 5 INJECTION INTRAMUSCULAR; INTRAVENOUS at 10:53

## 2020-06-27 RX ADMIN — ONDANSETRON PRN MG: 2 INJECTION INTRAMUSCULAR; INTRAVENOUS at 13:37

## 2020-06-27 RX ADMIN — IPRATROPIUM BROMIDE AND ALBUTEROL SULFATE SCH ML: .5; 3 SOLUTION RESPIRATORY (INHALATION) at 19:40

## 2020-06-27 RX ADMIN — Medication PRN EACH: at 09:38

## 2020-06-27 RX ADMIN — INSULIN LISPRO SCH UNITS: 100 INJECTION, SOLUTION INTRAVENOUS; SUBCUTANEOUS at 06:00

## 2020-06-27 RX ADMIN — INSULIN LISPRO SCH UNITS: 100 INJECTION, SOLUTION INTRAVENOUS; SUBCUTANEOUS at 17:49

## 2020-06-27 RX ADMIN — ONDANSETRON PRN MG: 2 INJECTION INTRAMUSCULAR; INTRAVENOUS at 08:56

## 2020-06-27 RX ADMIN — PANTOPRAZOLE SODIUM SCH MG: 40 INJECTION, POWDER, FOR SOLUTION INTRAVENOUS at 08:56

## 2020-06-27 RX ADMIN — ACETYLCYSTEINE SCH MG: 200 INHALANT RESPIRATORY (INHALATION) at 12:00

## 2020-06-27 RX ADMIN — IPRATROPIUM BROMIDE AND ALBUTEROL SULFATE SCH ML: .5; 3 SOLUTION RESPIRATORY (INHALATION) at 23:17

## 2020-06-27 RX ADMIN — ACETYLCYSTEINE SCH MG: 200 INHALANT RESPIRATORY (INHALATION) at 19:40

## 2020-06-27 RX ADMIN — IPRATROPIUM BROMIDE AND ALBUTEROL SULFATE SCH ML: .5; 3 SOLUTION RESPIRATORY (INHALATION) at 16:25

## 2020-06-27 RX ADMIN — IPRATROPIUM BROMIDE AND ALBUTEROL SULFATE SCH ML: .5; 3 SOLUTION RESPIRATORY (INHALATION) at 12:28

## 2020-06-27 NOTE — PDOC
SURGICAL PROGRESS NOTE


Subjective


sleeping


d/w nursing


Vital Signs





Vital Signs








  Date Time  Temp Pulse Resp B/P (MAP) Pulse Ox O2 Delivery O2 Flow Rate FiO2


 


6/27/20 08:00 98.6 116 26 122/67 (85) 99 Tracheal Collar  





 98.6       


 


6/27/20 04:42       8.0 








I&O











Intake and Output 


 


 6/27/20





 07:00


 


Intake Total 19.5 ml


 


Output Total 1705 ml


 


Balance -1685.5 ml


 


 


 


IV Total 19.5 ml


 


Output Urine Total 1535 ml


 


Gastric Drainage Total 100 ml


 


Chest Tube Drainage Total 60 ml


 


Drainage Total 10 ml








PATIENT HAS A VILLASENOR:  Yes


General:  No acute distress


HEENT:  Other (trach)


Lungs:  Other (ng in place)


Abdomen:  Soft, Other (drains in place)


Labs





Laboratory Tests








Test


 6/25/20


12:50 6/25/20


17:39 6/26/20


00:23 6/26/20


05:12


 


Glucose (Fingerstick)


 150 mg/dL


(70-99) 139 mg/dL


(70-99) 138 mg/dL


(70-99) 125 mg/dL


(70-99)


 


Test


 6/26/20


05:15 6/26/20


13:10 6/26/20


17:54 6/27/20


00:20


 


White Blood Count


 10.9 x10^3/uL


(4.0-11.0) 


 


 





 


Red Blood Count


 3.01 x10^6/uL


(3.50-5.40) 


 


 





 


Hemoglobin


 8.6 g/dL


(12.0-15.5) 


 


 





 


Hematocrit


 25.5 %


(36.0-47.0) 


 


 





 


Mean Corpuscular Volume 85 fL ()    


 


Mean Corpuscular Hemoglobin 29 pg (25-35)    


 


Mean Corpuscular Hemoglobin


Concent 34 g/dL


(31-37) 


 


 





 


Red Cell Distribution Width


 18.1 %


(11.5-14.5) 


 


 





 


Platelet Count


 380 x10^3/uL


(140-400) 


 


 





 


Neutrophils (%) (Auto) 78 % (31-73)    


 


Lymphocytes (%) (Auto) 12 % (24-48)    


 


Monocytes (%) (Auto) 7 % (0-9)    


 


Eosinophils (%) (Auto) 2 % (0-3)    


 


Basophils (%) (Auto) 0 % (0-3)    


 


Neutrophils # (Auto)


 8.5 x10^3/uL


(1.8-7.7) 


 


 





 


Lymphocytes # (Auto)


 1.3 x10^3/uL


(1.0-4.8) 


 


 





 


Monocytes # (Auto)


 0.8 x10^3/uL


(0.0-1.1) 


 


 





 


Eosinophils # (Auto)


 0.2 x10^3/uL


(0.0-0.7) 


 


 





 


Basophils # (Auto)


 0.0 x10^3/uL


(0.0-0.2) 


 


 





 


Sodium Level


 137 mmol/L


(136-145) 


 


 





 


Potassium Level


 4.1 mmol/L


(3.5-5.1) 


 


 





 


Chloride Level


 101 mmol/L


() 


 


 





 


Carbon Dioxide Level


 36 mmol/L


(21-32) 


 


 





 


Anion Gap 0 (6-14)    


 


Blood Urea Nitrogen


 15 mg/dL


(7-20) 


 


 





 


Creatinine


 0.6 mg/dL


(0.6-1.0) 


 


 





 


Estimated GFR


(Cockcroft-Gault) 106.3 


 


 


 





 


BUN/Creatinine Ratio 25 (6-20)    


 


Glucose Level


 126 mg/dL


(70-99) 


 


 





 


Calcium Level


 10.4 mg/dL


(8.5-10.1) 


 


 





 


Phosphorus Level


 4.4 mg/dL


(2.6-4.7) 


 


 





 


Magnesium Level


 2.1 mg/dL


(1.8-2.4) 


 


 





 


Total Bilirubin


 0.5 mg/dL


(0.2-1.0) 


 


 





 


Aspartate Amino Transf


(AST/SGOT) 19 U/L (15-37) 


 


 


 





 


Alanine Aminotransferase


(ALT/SGPT) 14 U/L (14-59) 


 


 


 





 


Alkaline Phosphatase


 161 U/L


() 


 


 





 


Total Protein


 4.6 g/dL


(6.4-8.2) 


 


 





 


Albumin


 1.1 g/dL


(3.4-5.0) 


 


 





 


Albumin/Globulin Ratio 0.3 (1.0-1.7)    


 


Glucose (Fingerstick)


 


 134 mg/dL


(70-99) 117 mg/dL


(70-99) 143 mg/dL


(70-99)


 


Test


 6/27/20


06:07 


 


 





 


Glucose (Fingerstick)


 134 mg/dL


(70-99) 


 


 











Laboratory Tests








Test


 6/26/20


13:10 6/26/20


17:54 6/27/20


00:20 6/27/20


06:07


 


Glucose (Fingerstick)


 134 mg/dL


(70-99) 117 mg/dL


(70-99) 143 mg/dL


(70-99) 134 mg/dL


(70-99)








Problem List


Problems


Medical Problems:


(1) Acute pancreatitis


Status: Acute  





(2) Cholelithiasis


Status: Acute  








Assessment/Plan


surgery planned tuesday





Justicifation of Admission Dx:


Justifications for Admission:


Justification of Admission Dx:  Yes











SAURAV NASH            Jun 27, 2020 08:31

## 2020-06-27 NOTE — PDOC
PROGRESS NOTES


Chief Complaint


Chief Complaint





History of Present Illness


50yo F w/ PMHx HTN, prediabetes who presented the emergency room complaints of 

abdominal pain. Patient described off and on 3 days. She states is constant, 

described as a squeezing sensation in a band-like distribution. + nausea, 

vomiting.  She denies any fever or diarrhea.  Patient denies any abdominal 

surgical procedures.  She stateed is worse with movements, car ride.  Pain 

initially was upper abdomen however now pretty much generalized.  Last bowel 

movement was 3/15/2020. Nothing makes her pain better.  Patient denies any 

shortness of breath.  She does state the pain moves into her chest.  Denies any 

headache or visual changes.


Lipase 44893, , , Bilirubin 1.4.


CT abdomen confirms pancreatic inflammation, peripancreatic fluid and 

inflammatory changes around the pancreas consistent with pancreatitis. 

Cholelithiasis and 1.4cm uterine fibroid as well as possible left salpingitis. 

Admitted for further care


Gallstone pancreatitis with necrosis. 


   -CT A/P 6/6 showed multiple pseudocysts, slight larger on the right. s/p 

drains x 3, 6/7.  + PSAE (MDRO-R Cefepime, Zosyn ANSON < 64) and yeast, 


   -s/p drain 4/27. C. parapsilosis. s/p drain 5/6 + yeast & high amylase; s/p 

additional drain on 5/8. Drains removed. 


Ascites s/p paracentesis 4/15 & 5/6. C. parapsilosis 


JED. off HD. 





Impression and plan:


Acute hypoxic Respiratory failure required  mechanical ventilation


Tracheostomy


bilateral pleural effusions/pulm edema s/p Throacentesis on 6/15/2020


Severe Acute gallstone pancreatitis (not a surgical candidate at this time) with

necrosis


Acute kidney failure now requiring dialysis


Gallstones (Calculus of gallbladder with acute cholecystitis without 

obstruction)


HTN 


Intractable pain


Intractable nausea


Covid 19 negative. 


Acute on chronic anemia 


EEG: No seizure activityFever  - better currently - intermittent could be from 

underlying pancreatitis blood cults 5/4 - neg so far


? Ileus with vomiting


Abd distention - U/S and CT reviewed s/p 0.4 L of opaque, debris-containing 

ascites was removed 5/6


Acute pancreatitis with persistent necrosis


Gallstone pancreatitis with necrosis. 


   -CT A/P 6/6 showed multiple pseudocysts, slight larger on the right. s/p 

drains x 3, 6/7.  + PSAE (MDRO-R Cefepime, Zosyn ANSON < 64) and yeast, 


   -s/p drain 4/27. C. parapsilosis. s/p drain 5/6 + yeast & high amylase; s/p 

additional drain on 5/8. Drains removed. 


Ascites s/p paracentesis 4/15 & 5/6. C. parapsilosis 


JED. off HD. 


A large fluid collection in the pancreatic bed has slightly decreased in size, 

described below, the pancreas itself is difficult to  visualize, which could be 

due to necrosis or obscuration of pancreatic  parenchyma from the surrounding 

fluid collection.6/15 


- 4/27 status post ROBERT drain placement + C paropsilosis. s/p additional drains 5/ 8


Anemia - S/p PRBCs


Cholelithiasis with thickening of the gallbladder wall.


Leucocytosis improving


JED, hyperkalemia, Metabolic acidosis off dialysis


hypocalcemia 


Prediabetes


HTN


s/p trach


ESRD on HD


Hyperglycemia


severe protein-caloric malnutrition


Moderate to large left pleural effusion with atelectasis and collapse  of most 

of the left lower lobe, stable





FEN - albumin, NS at 80 cc/hr, TPN


will start Mucomyst for secretions


Off antibiotics 


PPX - SCDs, off lovenox currently, high risk for thrombosis but in light of her 

recent anemia and need for transfusion the risks do not outweigh benefits at 

this time. will continue to evaluate on a daily basis


FULL CODE


Dispo - ICU, critically ill


Poor prognosis


























37 MIN CC TIME





History of Present Illness


History of Present Illness


6/8: IR placed drain on 6/7. 4u PRBC after Hb drop. Hb 8.8 today. Off Levophed 

this morning. T-max 100.3. Much more lethargic today. CXR with left sided 

diffuse infiltrates.


6/9: Tachycardic overnight into the 140s. NGT clamped. On BIPAP currently. 

Drains with serosanguinous discharge. WBC 8, Tmax 99.6F.


6/10: Seen on [facility] shield in ICU.  Hypertensive and tachycardic. Labs stable. 

blood stained drainage from drains. Afebrile.


6/11: Seen on [facility] shield in ICU. She is a bit confused, drowsy, but when 

sitting up is conversational and confusion somewhat clears. She is asking for 

more pain medication. Stable drains, still very tachy.Na 147


6/12: Patient vomited overnight. Aspirated. Tried to pull her trach out, she was

told she would die without her trach, she said "I know, I just want to go home".

Hb 7.6. Afebrile, still very tachycardic. 1055ml out of right sided ROBERT drain


6/13: Overnight hypoxic, on BIPAP. CXR with left sided white out lung. Sig

nificant mucous plug suctioned by RT with improvement in her ABG after 2 hours 

this morning. Not really active, tired, lethargic. 890ml out of drains past 24 

hours. On vent. D/w daughter bedside.


6/14: Still on vent overnight with copious pulmonary drainage on suctioning. 

Labs stable, UOP stable 1L. Drain output still significant with 1505mL. She has 

shown her phone password [id number] and made it clear she does not want her daughters 

to access her phone at this time.


6:15: Patient quite frail, she has had such a lengthy hospital stay that she is 

certainly depressed and as documented 3 days ago is wanting to go home. Most 

likely patient is also tired of being hospitalized with an end point no where in

sight. Reassurance and encouragement provided during my visit. Plans for 

thoracentesis later in the day 


6/16: No acute events reported overnight, case discussed with nursing staff 

patient in no acute distress, complaints of the abdomimal pain during my visit, 

patient is quite depressed, reassurance has been provided, discussed with 

nursing staff at bedside, replace electrolytes 


6/17 off vent / on TS , no distress








6/25 /2020





Patient seen and examined ICU BED


guarded-long-term prognosis 


Sputum from 6/13 - growing PSA - may be colonization I to Meropenem add Cipro


Continue meropenem, has MDRP PSAE June 7 from abd


cont micafungin June 7


bleeding from Sx. site RLQ and firmness)stable


oncology consulted   


Cholelithiasis. Mild thickened appearance of the gallbladder wall. sono 6/25  

Mild right hydronephrosis.Fluid collection identified anterior to the pancreas. 

   


ct drainage 200 cc/24 hrs


d/c antibiotics , observe  6/25     


cxr pending 6/25 6/26/2020


Patient having trouble with secretions this morning, no other complaints, 

discussed with surgical ARNP. Plans for OR on Tuesday. No fever above 99.9 

overnight, remains tachycardic, medications reviewed.





6/27/2020


Patient with thick secretions, no other acute events reported overnight.  

Patient seems to be quite anxious, I have tried to provide reassurance during my

encounter regarding her upcoming surgical procedure.  Patient seems to be quite 

anxious and seems to experience panic attacks when she first wakes up thinking 

that is the day of surgery.  At the present time she seems to be medically 

optimized for planned surgery, discussed with nursing staff at bedside


  


      




















Date:  Apr 27, 2020





Pre-Op Diagnosis:


Necrotizing pancreatitis





Post-Op Diagnosis:


same





Procedure Performed:


laparoscopic exploration





Surgeon:


Frandy Flores


Asst:  Dr. Luis Santos





Anesthesia Type:


GETA plus local





Blood Loss:


50





Specimans Obtained:


cultures, debris





Findings:


1000 cc ascites suctioned off, cultures sent, diffuse debris, obliteration of 

surgical planes preventing any meaningful exploration




















 


 





 








6/1/2020


Patient seen and examined in the ICU


She appears extremely ill


She is tachypneic at 35 respirations per minute and tachycardic at 132 bpm


She is extremely encephalopathic and shaky


She appears clammy


Chart reviewed


Discussed with RN


Prognosis extremely guarded at best








5/31/2020


Patient still in ICU


Resting with no apparent distress


Chart reviewed








5/30/2020


Patient seen and examined in the ICU


She is wiping her face with a cough


Discussed with RN


Chart reviewed


We hope to get her out of the ICU later today if possible








5/29/2020


Patient seen and examined in the ICU once again


She is back on NG suction


On IV Zosyn


Has IV TPN


Sedated with Precedex but anxious still


Appears somewhat clammy and pale


Chart reviewed


Discussed with RN


She remains critically ill











 


 


BRIEF OPERATIVE NOTE


Pre-Op Diagnosis


Pancreatitis with pseudocysts, suspected infection


Post-Op Diagnosis


same


Procedure Performed


CT abdominal Drains x 3


Surgeon


Hardy


Anesthesia Type:  Conscious Sedation


Findings


3 abdominal drains, 14F, with turbid pancreatic fluid and necrotic debris in 

each.


Complications


No immediate








5/9: Patient today somewhat restless and having bilious secretions from ET tube,

imaging studies ordered, discussed with consultant. Pretty poor prognosis, 

hopefully is not a fistula, poor surgical candidate. 





5/10: Imaging with no acute events, she seems more stable today compared to 

yesterday. Encouraged as much activity as possible patient at high risk for 

severe depression.





Vitals


Vitals





Vital Signs








  Date Time  Temp Pulse Resp B/P (MAP) Pulse Ox O2 Delivery O2 Flow Rate FiO2


 


6/27/20 08:58     100 Tracheal Collar 8.0 


 


6/27/20 08:00 98.6 116 26 122/67 (85)    





 98.6       











Physical Exam


Physical Exam


GENERAL: Patient alert awake comfortable


HEENT: Anicteric, no thrush


NECK:  Tracheostomy 


LUNGS: Diminished aeration bases, no accessory muscle use - CT on left with 

clear fluid


HEART:  S1, S2,regular 


ABDOMEN: Mild distention, bowel sounds present, soft, grimaces to palpation, 

drains x 3


: [place] ( 6/7)


EXTREMITIES: + edema BLE


SKIN: no signs of gen rash 


LUE-PICC  without signs of complications


General:  No acute distress


Heart:  Regular rate (SR/ST), Other (distant heart sounds)


Lungs:  Other (diminshed in bases, Rhonci in LLL)


Abdomen:  Soft, Other (drains in place)


Extremities:  No cyanosis, Other (3+ bilateral LE pitting edema)


Skin:  No rashes, No significant lesion





Labs


LABS





Laboratory Tests








Test


 6/26/20


13:10 6/26/20


17:54 6/27/20


00:20 6/27/20


06:07


 


Glucose (Fingerstick)


 134 mg/dL


(70-99) 117 mg/dL


(70-99) 143 mg/dL


(70-99) 134 mg/dL


(70-99)











Assessment and Plan


Assessmemt and Plan


Problems


Medical Problems:


(1) Acute pancreatitis


Status: Acute  





(2) Cholelithiasis


Status: Acute  











Comment


Review of Relevant


I have reviewed the following items josy (where applicable) has been applied.


Labs





Laboratory Tests








Test


 6/25/20


12:50 6/25/20


17:39 6/26/20


00:23 6/26/20


05:12


 


Glucose (Fingerstick)


 150 mg/dL


(70-99) 139 mg/dL


(70-99) 138 mg/dL


(70-99) 125 mg/dL


(70-99)


 


Test


 6/26/20


05:15 6/26/20


13:10 6/26/20


17:54 6/27/20


00:20


 


White Blood Count


 10.9 x10^3/uL


(4.0-11.0) 


 


 





 


Red Blood Count


 3.01 x10^6/uL


(3.50-5.40) 


 


 





 


Hemoglobin


 8.6 g/dL


(12.0-15.5) 


 


 





 


Hematocrit


 25.5 %


(36.0-47.0) 


 


 





 


Mean Corpuscular Volume 85 fL ()    


 


Mean Corpuscular Hemoglobin 29 pg (25-35)    


 


Mean Corpuscular Hemoglobin


Concent 34 g/dL


(31-37) 


 


 





 


Red Cell Distribution Width


 18.1 %


(11.5-14.5) 


 


 





 


Platelet Count


 380 x10^3/uL


(140-400) 


 


 





 


Neutrophils (%) (Auto) 78 % (31-73)    


 


Lymphocytes (%) (Auto) 12 % (24-48)    


 


Monocytes (%) (Auto) 7 % (0-9)    


 


Eosinophils (%) (Auto) 2 % (0-3)    


 


Basophils (%) (Auto) 0 % (0-3)    


 


Neutrophils # (Auto)


 8.5 x10^3/uL


(1.8-7.7) 


 


 





 


Lymphocytes # (Auto)


 1.3 x10^3/uL


(1.0-4.8) 


 


 





 


Monocytes # (Auto)


 0.8 x10^3/uL


(0.0-1.1) 


 


 





 


Eosinophils # (Auto)


 0.2 x10^3/uL


(0.0-0.7) 


 


 





 


Basophils # (Auto)


 0.0 x10^3/uL


(0.0-0.2) 


 


 





 


Sodium Level


 137 mmol/L


(136-145) 


 


 





 


Potassium Level


 4.1 mmol/L


(3.5-5.1) 


 


 





 


Chloride Level


 101 mmol/L


() 


 


 





 


Carbon Dioxide Level


 36 mmol/L


(21-32) 


 


 





 


Anion Gap 0 (6-14)    


 


Blood Urea Nitrogen


 15 mg/dL


(7-20) 


 


 





 


Creatinine


 0.6 mg/dL


(0.6-1.0) 


 


 





 


Estimated GFR


(Cockcroft-Gault) 106.3 


 


 


 





 


BUN/Creatinine Ratio 25 (6-20)    


 


Glucose Level


 126 mg/dL


(70-99) 


 


 





 


Calcium Level


 10.4 mg/dL


(8.5-10.1) 


 


 





 


Phosphorus Level


 4.4 mg/dL


(2.6-4.7) 


 


 





 


Magnesium Level


 2.1 mg/dL


(1.8-2.4) 


 


 





 


Total Bilirubin


 0.5 mg/dL


(0.2-1.0) 


 


 





 


Aspartate Amino Transf


(AST/SGOT) 19 U/L (15-37) 


 


 


 





 


Alanine Aminotransferase


(ALT/SGPT) 14 U/L (14-59) 


 


 


 





 


Alkaline Phosphatase


 161 U/L


() 


 


 





 


Total Protein


 4.6 g/dL


(6.4-8.2) 


 


 





 


Albumin


 1.1 g/dL


(3.4-5.0) 


 


 





 


Albumin/Globulin Ratio 0.3 (1.0-1.7)    


 


Glucose (Fingerstick)


 


 134 mg/dL


(70-99) 117 mg/dL


(70-99) 143 mg/dL


(70-99)


 


Test


 6/27/20


06:07 


 


 





 


Glucose (Fingerstick)


 134 mg/dL


(70-99) 


 


 











Laboratory Tests








Test


 6/26/20


13:10 6/26/20


17:54 6/27/20


00:20 6/27/20


06:07


 


Glucose (Fingerstick)


 134 mg/dL


(70-99) 117 mg/dL


(70-99) 143 mg/dL


(70-99) 134 mg/dL


(70-99)








Microbiology


6/18/20 Blood Culture - Final, Complete


          NO GROWTH AFTER 5 DAYS


6/15/20 Gram Stain - Final, Complete


          


6/15/20 Aerobic and Anaerobic Culture - Final, Complete


          


6/13/20 Gram Stain Evaluation - Final, Complete


          


6/13/20 Respiratory Culture - Final, Complete


          


6/13/20 Antimicrobic Susceptibility - Final, Complete


          


6/7/20 Urine Culture - Final, Complete


         


5/30/20 Gram Stain - Final, Complete


          


5/30/20 Aerobic Culture - Final, Complete


Medications





Current Medications


Sodium Chloride 1,000 ml @  1,000 mls/hr Q1H IV  Last administered on 3/16/20at 

03:00;  Start 3/16/20 at 03:00;  Stop 3/16/20 at 03:59;  Status DC


Ondansetron HCl (Zofran) 4 mg 1X  ONCE IVP  Last administered on 3/16/20at 

03:27;  Start 3/16/20 at 03:00;  Stop 3/16/20 at 03:01;  Status DC


Morphine Sulfate (Morphine Sulfate) 4 mg 1X  ONCE IV ;  Start 3/16/20 at 03:00; 

Stop 3/16/20 at 03:01;  Status Cancel


Ketorolac Tromethamine (Toradol 30mg Vial) 30 mg 1X  ONCE IV  Last administered 

on 3/16/20at 02:54;  Start 3/16/20 at 03:00;  Stop 3/16/20 at 03:01;  Status DC


Fentanyl Citrate (Fentanyl 2ml Vial) 25 mcg 1X  ONCE IVP  Last administered on 

3/16/20at 03:23;  Start 3/16/20 at 03:30;  Stop 3/16/20 at 03:31;  Status DC


Fentanyl Citrate (Fentanyl 2ml Vial) 100 mcg STK-MED ONCE .ROUTE ;  Start 3/1

6/20 at 03:18;  Stop 3/16/20 at 03:18;  Status DC


Iohexol (Omnipaque 350 Mg/ml) 90 ml 1X  ONCE IV  Last administered on 3/16/20at 

03:25;  Start 3/16/20 at 03:30;  Stop 3/16/20 at 03:31;  Status DC


Info (CONTRAST GIVEN -- Rx MONITORING) 1 each PRN DAILY  PRN MC SEE COMMENTS;  

Start 3/16/20 at 03:30;  Stop 3/18/20 at 03:29;  Status DC


Hydromorphone HCl (Dilaudid) 0.5 mg 1X  ONCE IV  Last administered on 3/16/20at 

03:55;  Start 3/16/20 at 04:30;  Stop 3/16/20 at 04:32;  Status DC


Ondansetron HCl (Zofran) 4 mg PRN Q8HRS  PRN IV NAUSEA/VOMITING 1ST CHOICE;  

Start 3/16/20 at 05:00;  Stop 3/16/20 at 09:27;  Status DC


Morphine Sulfate (Morphine Sulfate) 2 mg PRN Q2HR  PRN IV SEVERE PAIN 7-10 Last 

administered on 3/17/20at 12:26;  Start 3/16/20 at 05:00;  Stop 3/17/20 at 

14:15;  Status DC


Sodium Chloride 1,000 ml @  125 mls/hr Q8H IV  Last administered on 3/16/20at 

20:56;  Start 3/16/20 at 05:00;  Stop 3/17/20 at 04:59;  Status DC


Hydromorphone HCl (Dilaudid) 0.5 mg PRN Q3HRS  PRN IV SEVERE PAIN 7-10 Last 

administered on 3/17/20at 10:06;  Start 3/16/20 at 05:00;  Stop 3/17/20 at 

12:01;  Status DC


Piperacillin Sod/ Tazobactam Sod 4.5 gm/Sodium Chloride 100 ml @  200 mls/hr 1X 

ONCE IV  Last administered on 3/16/20at 05:44;  Start 3/16/20 at 06:00;  Stop 

3/16/20 at 06:29;  Status DC


Ondansetron HCl (Zofran) 4 mg PRN Q4HRS  PRN IV NAUSEA/VOMITING 1ST CHOICE Last 

administered on 6/27/20at 08:56;  Start 3/16/20 at 09:30


Insulin Human Lispro (HumaLOG) 0-9 UNITS Q6HRS SQ  Last administered on 

6/24/20at 18:05;  Start 3/16/20 at 09:30


Dextrose (Dextrose 50%-Water Syringe) 12.5 gm PRN Q15MIN  PRN IV SEE COMMENTS;  

Start 3/16/20 at 09:30


Pantoprazole Sodium (PROTONIX VIAL for IV PUSH) 40 mg DAILYAC IVP  Last 

administered on 6/27/20at 08:56;  Start 3/16/20 at 11:30


Prochlorperazine Edisylate (Compazine) 10 mg PRN Q6HRS  PRN IV NAUSEA/VOMITING, 

2nd CHOICE Last administered on 6/24/20at 11:56;  Start 3/16/20 at 17:45


Atenolol (Tenormin) 100 mg DAILY PO ;  Start 3/17/20 at 09:00;  Stop 3/16/20 at 

20:08;  Status DC


Metoprolol Tartrate (Lopressor Vial) 2.5 mg Q6HRS IVP  Last administered on 

3/17/20at 05:51;  Start 3/16/20 at 20:15;  Stop 3/17/20 at 10:02;  Status DC


Metoprolol Tartrate (Lopressor Vial) 5 mg Q6HRS IVP  Last administered on 

3/26/20at 00:12;  Start 3/17/20 at 10:15;  Stop 3/28/20 at 08:48;  Status DC


Hydromorphone HCl (Dilaudid) 1 mg PRN Q3HRS  PRN IV SEVERE PAIN 7-10 Last 

administered on 3/23/20at 05:13;  Start 3/17/20 at 12:00;  Stop 3/31/20 at 

00:25;  Status DC


Lidocaine HCl (Buffered Lidocaine 1%) 3 ml STK-MED ONCE .ROUTE ;  Start 3/17/20 

at 12:55;  Stop 3/17/20 at 12:56;  Status DC


Albumin Human 500 ml @  125 mls/hr 1X  ONCE IV  Last administered on 3/17/20at 

14:33;  Start 3/17/20 at 14:30;  Stop 3/17/20 at 18:32;  Status DC


Norepinephrine Bitartrate 8 mg/ Dextrose 258 ml @  17.299 mls/ hr CONT  PRN IV 

PER PROTOCOL Last administered on 4/14/20at 12:48;  Start 3/17/20 at 15:30;  

Stop 4/17/20 at 09:19;  Status DC


Sodium Chloride 1,000 ml @  125 mls/hr Q8H IV  Last administered on 3/17/20at 

21:04;  Start 3/17/20 at 16:00;  Stop 3/18/20 at 02:42;  Status DC


Albumin Human 500 ml @  125 mls/hr PRN BID  PRN IV After every 2L NSS & BP < 

90mm Last administered on 6/6/20at 11:40;  Start 3/17/20 at 16:00


Iohexol (Omnipaque 300 Mg/ml) 60 ml 1X  ONCE IV  Last administered on 3/17/20at 

17:20;  Start 3/17/20 at 17:00;  Stop 3/17/20 at 17:01;  Status DC


Info (CONTRAST GIVEN -- Rx MONITORING) 1 each PRN DAILY  PRN MC SEE COMMENTS;  

Start 3/17/20 at 17:00;  Stop 3/19/20 at 16:59;  Status DC


Meropenem 1 gm/ Sodium Chloride 100 ml @  200 mls/hr Q8HRS IV  Last administered

on 3/18/20at 05:45;  Start 3/17/20 at 20:00;  Stop 3/18/20 at 08:48;  Status DC


Furosemide (Lasix) 40 mg 1X  ONCE IVP  Last administered on 3/17/20at 22:12;  

Start 3/17/20 at 22:30;  Stop 3/17/20 at 22:31;  Status DC


Calcium Chloride 1000 mg/Sodium Chloride 110 ml @  220 mls/hr 1X  ONCE IV  Last 

administered on 3/17/20at 22:11;  Start 3/17/20 at 22:30;  Stop 3/17/20 at 

22:59;  Status DC


Albuterol Sulfate (Ventolin Neb Soln) 2.5 mg 1X  ONCE NEB  Last administered on 

3/18/20at 00:56;  Start 3/17/20 at 22:30;  Stop 3/17/20 at 22:31;  Status DC


Insulin Human Regular (HumuLIN R VIAL) 5 unit 1X  ONCE IV  Last administered on 

3/17/20at 22:14;  Start 3/17/20 at 22:30;  Stop 3/17/20 at 22:31;  Status DC


Magnesium Sulfate 50 ml @ 25 mls/hr 1X  ONCE IV  Last administered on 3/18/20at 

02:57;  Start 3/18/20 at 03:00;  Stop 3/18/20 at 04:59;  Status DC


Calcium Gluconate 1000 mg/Sodium Chloride 110 ml @  220 mls/hr 1X  ONCE IV  Last

administered on 3/18/20at 02:46;  Start 3/18/20 at 03:00;  Stop 3/18/20 at 

03:29;  Status DC


Sodium Chloride 1,000 ml @  200 mls/hr Q5H IV  Last administered on 3/18/20at 

02:46;  Start 3/18/20 at 03:00;  Stop 3/18/20 at 10:21;  Status DC


Calcium Gluconate 1000 mg/Sodium Chloride 110 ml @  220 mls/hr 1X  ONCE IV  Last

administered on 3/18/20at 03:21;  Start 3/18/20 at 03:30;  Stop 3/18/20 at 

03:59;  Status DC


Sodium Bicarbonate 50 meq/Sodium Chloride 1,050 ml @  75 mls/hr Q14H IV  Last 

administered on 3/22/20at 21:10;  Start 3/18/20 at 07:30;  Stop 3/23/20 at 

10:28;  Status DC


Calcium Gluconate 2000 mg/Sodium Chloride 120 ml @  220 mls/hr 1X  ONCE IV  Last

administered on 3/18/20at 09:05;  Start 3/18/20 at 07:30;  Stop 3/18/20 at 

08:02;  Status DC


Lidocaine HCl (Xylocaine-Mpf 1% 2ml Vial) 2 ml STK-MED ONCE .ROUTE ;  Start 

3/18/20 at 08:47;  Stop 3/18/20 at 08:47;  Status DC


Meropenem 500 mg/ Sodium Chloride 50 ml @  100 mls/hr Q12HR IV  Last admin

istered on 3/23/20at 21:01;  Start 3/18/20 at 18:00;  Stop 3/24/20 at 07:58;  

Status DC


Lidocaine HCl (Buffered Lidocaine 1%) 3 ml STK-MED ONCE .ROUTE ;  Start 3/18/20 

at 09:46;  Stop 3/18/20 at 09:46;  Status DC


Lidocaine HCl (Buffered Lidocaine 1%) 6 ml 1X  ONCE INJ  Last administered on 

3/18/20at 10:26;  Start 3/18/20 at 10:15;  Stop 3/18/20 at 10:16;  Status DC


Info (Tpn Per Pharmacy) 1 each PRN DAILY  PRN MC SEE COMMENTS Last administered 

on 6/27/20at 09:38;  Start 3/18/20 at 12:00


Sodium Chloride 1,000 ml @  1,000 mls/hr Q1H PRN IV hypotension;  Start 3/18/20 

at 12:07;  Stop 3/18/20 at 18:06;  Status DC


Diphenhydramine HCl (Benadryl) 25 mg 1X PRN  PRN IV ITCHING;  Start 3/18/20 at 

12:15;  Stop 3/19/20 at 12:14;  Status DC


Diphenhydramine HCl (Benadryl) 25 mg 1X PRN  PRN IV ITCHING;  Start 3/18/20 at 

12:15;  Stop 3/19/20 at 12:14;  Status DC


Sodium Chloride 1,000 ml @  400 mls/hr Q2H30M PRN IV PATENCY;  Start 3/18/20 at 

12:07;  Stop 3/19/20 at 00:06;  Status DC


Info (PHARMACY MONITORING -- do not chart) 1 each PRN DAILY  PRN MC SEE 

COMMENTS;  Start 3/18/20 at 12:15;  Stop 3/20/20 at 08:13;  Status DC


Sodium Chloride 90 meq/Calcium Gluconate 10 meq/ Multivitamins 10 ml/Chromium/ 

Copper/Manganese/ Seleni/Zn 1 ml/ Total Parenteral Nutrition/Amino Acids/Dextr

ose/ Fat Emulsion Intravenous 55.005 ml  @ 2.292 mls/hr TPN  CONT IV ;  Start 

3/18/20 at 22:00;  Stop 3/18/20 at 12:33;  Status DC


Info (Tpn Per Pharmacy) 1 each PRN DAILY  PRN MC SEE COMMENTS;  Start 3/18/20 at

12:30;  Status UNV


Sodium Chloride 90 meq/Calcium Gluconate 10 meq/ Multivitamins 10 ml/Chromium/ 

Copper/Manganese/ Seleni/Zn 0.5 ml/ Total Parenteral Nutrition/Amino Acids

/Dextrose/ Fat Emulsion Intravenous 1,512 ml @  63 mls/hr TPN  CONT IV  Last 

administered on 3/18/20at 22:06;  Start 3/18/20 at 22:00;  Stop 3/19/20 at 

21:59;  Status DC


Calcium Carbonate/ Glycine (Tums) 500 mg PRN AFTMEALHC  PRN PO INDIGESTION;  

Start 3/18/20 at 17:45;  Stop 5/13/20 at 10:25;  Status DC


Calcium Gluconate (Calcium Gluconate) 2,000 mg 1X  ONCE IVP  Last administered 

on 3/19/20at 02:19;  Start 3/19/20 at 02:15;  Stop 3/19/20 at 02:16;  Status DC


Calcium Chloride 3000 mg/Sodium Chloride 1,030 ml @  50 mls/hr [id number] IV  Last 

administered on 3/21/20at 02:17;  Start 3/19/20 at 08:00;  Stop 3/21/20 at 

15:23;  Status DC


Lorazepam (Ativan Inj) 1 mg PRN Q4HRS  PRN IVP ANXIETY / AGITATION, 2nd choic 

Last administered on 4/17/20at 03:51;  Start 3/19/20 at 09:00;  Stop 4/17/20 at 

09:19;  Status DC


Sodium Chloride 1,000 ml @  1,000 mls/hr Q1H PRN IV hypotension;  Start 3/19/20 

at 08:56;  Stop 3/19/20 at 14:55;  Status DC


Albumin Human 200 ml @  200 mls/hr 1X PRN  PRN IV Hypotension;  Start 3/19/20 at

09:00;  Stop 3/19/20 at 14:59;  Status DC


Diphenhydramine HCl (Benadryl) 25 mg 1X PRN  PRN IV ITCHING;  Start 3/19/20 at 

09:00;  Stop 3/20/20 at 08:59;  Status DC


Diphenhydramine HCl (Benadryl) 25 mg 1X PRN  PRN IV ITCHING;  Start 3/19/20 at 

09:00;  Stop 3/20/20 at 08:59;  Status DC


Sodium Chloride 1,000 ml @  400 mls/hr Q2H30M PRN IV PATENCY;  Start 3/19/20 at 

08:56;  Stop 3/19/20 at 20:55;  Status DC


Info (PHARMACY MONITORING -- do not chart) 1 each PRN DAILY  PRN MC SEE 

COMMENTS;  Start 3/19/20 at 09:00;  Status UNV


Info (PHARMACY MONITORING -- do not chart) 1 each PRN DAILY  PRN MC SEE 

COMMENTS;  Start 3/19/20 at 09:00;  Stop 3/20/20 at 08:13;  Status DC


Digoxin (Lanoxin) 500 mcg 1X  ONCE IV  Last administered on 3/19/20at 10:04;  

Start 3/19/20 at 10:00;  Stop 3/19/20 at 10:01;  Status DC


Digoxin (Lanoxin) 125 mcg 1X  ONCE IV  Last administered on 3/19/20at 17:10;  

Start 3/19/20 at 18:00;  Stop 3/19/20 at 18:01;  Status DC


Magnesium Sulfate 100 ml @  25 mls/hr 1X  ONCE IV  Last administered on 

3/19/20at 12:48;  Start 3/19/20 at 13:00;  Stop 3/19/20 at 16:59;  Status DC


Sodium Chloride 90 meq/Magnesium Sulfate 10 meq/ Calcium Gluconate 20 meq/ 

Multivitamins 10 ml/Chromium/ Copper/Manganese/ Seleni/Zn 0.5 ml/ Total 

Parenteral Nutrition/Amino Acids/Dextrose/ Fat Emulsion Intravenous 1,512 ml @  

63 mls/hr TPN  CONT IV  Last administered on 3/19/20at 22:25;  Start 3/19/20 at 

22:00;  Stop 3/20/20 at 21:59;  Status DC


Sodium Chloride 1,000 ml @  1,000 mls/hr Q1H PRN IV hypotension;  Start 3/20/20 

at 08:05;  Stop 3/20/20 at 14:04;  Status DC


Albumin Human 200 ml @  200 mls/hr 1X  ONCE IV  Last administered on 3/20/20at 

08:57;  Start 3/20/20 at 08:15;  Stop 3/20/20 at 09:14;  Status DC


Diphenhydramine HCl (Benadryl) 25 mg 1X PRN  PRN IV ITCHING;  Start 3/20/20 at 

08:15;  Stop 3/21/20 at 08:14;  Status DC


Diphenhydramine HCl (Benadryl) 25 mg 1X PRN  PRN IV ITCHING;  Start 3/20/20 at 

08:15;  Stop 3/21/20 at 08:14;  Status DC


Sodium Chloride 1,000 ml @  400 mls/hr Q2H30M PRN IV PATENCY;  Start 3/20/20 at 

08:05;  Stop 3/20/20 at 20:04;  Status DC


Info (PHARMACY MONITORING -- do not chart) 1 each PRN DAILY  PRN MC SEE 

COMMENTS;  Start 3/20/20 at 08:15;  Stop 3/24/20 at 07:57;  Status DC


Sodium Chloride 90 meq/Potassium Chloride 15 meq/ Potassium Phosphate 10 mmol/ 

Magnesium Sulfate 10 meq/Calcium Gluconate 20 meq/ Multivitamins 10 ml/Chromium/

Copper/Manganese/ Seleni/Zn 0.5 ml/ Total Parenteral Nutrition/Amino 

Acids/Dextrose/ Fat Emulsion Intravenous 1,512 ml @  63 mls/hr TPN  CONT IV  

Last administered on 3/20/20at 21:01;  Start 3/20/20 at 22:00;  Stop 3/21/20 at 

21:59;  Status DC


Potassium Chloride/Water 100 ml @  100 mls/hr 1X  ONCE IV  Last administered on 

3/20/20at 14:09;  Start 3/20/20 at 14:00;  Stop 3/20/20 at 14:59;  Status DC


Benzocaine (Hurricaine One) 1 spray 1X  ONCE MM  Last administered on 3/20/20at 

16:38;  Start 3/20/20 at 14:30;  Stop 3/20/20 at 14:31;  Status DC


Lidocaine HCl (Glydo (Lidocaine) Jelly) 1 thomas 1X  ONCE MM  Last administered on 

3/20/20at 16:38;  Start 3/20/20 at 14:30;  Stop 3/20/20 at 14:31;  Status DC


Linezolid/Dextrose 300 ml @  300 mls/hr Q12HR IV  Last administered on 3/26/20at

21:04;  Start 3/20/20 at 20:00;  Stop 3/27/20 at 07:50;  Status DC


Acetaminophen (Tylenol) 650 mg PRN Q6HRS  PRN PO MILD PAIN / TEMP;  Start 

3/21/20 at 03:30;  Stop 3/21/20 at 03:36;  Status DC


Acetaminophen (Tylenol) 650 mg PRN Q6HRS  PRN PEG MILD PAIN / TEMP Last 

administered on 4/16/20at 19:56;  Start 3/21/20 at 03:36;  Stop 5/13/20 at 

10:25;  Status DC


Sodium Chloride 1,000 ml @  1,000 mls/hr Q1H PRN IV hypotension;  Start 3/21/20 

at 07:50;  Stop 3/21/20 at 13:49;  Status DC


Albumin Human 200 ml @  200 mls/hr 1X PRN  PRN IV Hypotension;  Start 3/21/20 at

08:00;  Stop 3/21/20 at 13:59;  Status DC


Sodium Chloride (Normal Saline Flush) 10 ml 1X PRN  PRN IV AP catheter pack;  

Start 3/21/20 at 08:00;  Stop 3/22/20 at 07:59;  Status DC


Sodium Chloride (Normal Saline Flush) 10 ml 1X PRN  PRN IV  catheter pack;  

Start 3/21/20 at 08:00;  Stop 3/22/20 at 07:59;  Status DC


Sodium Chloride 1,000 ml @  400 mls/hr Q2H30M PRN IV PATENCY;  Start 3/21/20 at 

07:50;  Stop 3/21/20 at 19:49;  Status DC


Info (PHARMACY MONITORING -- do not chart) 1 each PRN DAILY  PRN MC SEE 

COMMENTS;  Start 3/21/20 at 08:00;  Status UNV


Info (PHARMACY MONITORING -- do not chart) 1 each PRN DAILY  PRN MC SEE 

COMMENTS;  Start 3/21/20 at 08:00;  Stop 3/23/20 at 08:25;  Status DC


Sodium Chloride 90 meq/Potassium Chloride 15 meq/ Potassium Phosphate 10 mmol/ 

Magnesium Sulfate 10 meq/Calcium Gluconate 20 meq/ Multivitamins 10 ml/Chromium/

Copper/Manganese/ Seleni/Zn 0.5 ml/ Total Parenteral Nutrition/Amino 

Acids/Dextrose/ Fat Emulsion Intravenous 1,512 ml @  63 mls/hr TPN  CONT IV  

Last administered on 3/21/20at 20:57;  Start 3/21/20 at 22:00;  Stop 3/22/20 at 

21:59;  Status DC


Sodium Chloride 90 meq/Potassium Chloride 15 meq/ Potassium Phosphate 15 mmol/ 

Magnesium Sulfate 10 meq/Calcium Gluconate 20 meq/ Multivitamins 10 ml/Chromium/

Copper/Manganese/ Seleni/Zn 0.5 ml/ Total Parenteral Nutrition/Amino 

Acids/Dextrose/ Fat Emulsion Intravenous 1,512 ml @  63 mls/hr TPN  CONT IV ;  

Start 3/22/20 at 22:00;  Stop 3/22/20 at 14:16;  Status DC


Sodium Chloride 90 meq/Potassium Chloride 15 meq/ Potassium Phosphate 15 mmol/ 

Magnesium Sulfate 10 meq/Calcium Gluconate 20 meq/ Multivitamins 10 ml/Chromium/

Copper/Manganese/ Seleni/Zn 0.5 ml/ Total Parenteral Nutrition/Amino 

Acids/Dextrose/ Fat Emulsion Intravenous 1,200 ml @  50 mls/hr TPN  CONT IV ;  

Start 3/22/20 at 22:00;  Stop 3/22/20 at 14:17;  Status DC


Sodium Chloride 90 meq/Potassium Chloride 15 meq/ Potassium Phosphate 10 mmol/ 

Magnesium Sulfate 10 meq/Calcium Gluconate 20 meq/ Multivitamins 10 ml/Chromium/

Copper/Manganese/ Seleni/Zn 0.5 ml/ Total Parenteral Nutrition/Amino 

Acids/Dextrose/ Fat Emulsion Intravenous 1,200 ml @  50 mls/hr TPN  CONT IV  

Last administered on 3/22/20at 23:29;  Start 3/22/20 at 22:00;  Stop 3/23/20 at 

21:59;  Status DC


Sodium Chloride 1,000 ml @  1,000 mls/hr Q1H PRN IV hypotension;  Start 3/23/20 

at 07:28;  Stop 3/23/20 at 13:27;  Status DC


Albumin Human 200 ml @  200 mls/hr 1X  ONCE IV  Last administered on 3/23/20at 

08:51;  Start 3/23/20 at 07:30;  Stop 3/23/20 at 08:29;  Status DC


Diphenhydramine HCl (Benadryl) 25 mg 1X PRN  PRN IV ITCHING;  Start 3/23/20 at 

07:30;  Stop 3/24/20 at 07:29;  Status DC


Diphenhydramine HCl (Benadryl) 25 mg 1X PRN  PRN IV ITCHING;  Start 3/23/20 at 

07:30;  Stop 3/24/20 at 07:29;  Status DC


Sodium Chloride 1,000 ml @  400 mls/hr Q2H30M PRN IV PATENCY;  Start 3/23/20 at 

07:28;  Stop 3/23/20 at 19:27;  Status DC


Info (PHARMACY MONITORING -- do not chart) 1 each PRN DAILY  PRN MC SEE 

COMMENTS;  Start 3/23/20 at 07:30;  Stop 4/3/20 at 13:01;  Status DC


Metronidazole 100 ml @  100 mls/hr Q6HRS IV  Last administered on 4/8/20at 

06:26;  Start 3/23/20 at 08:30;  Stop 4/8/20 at 09:58;  Status DC


Micafungin Sodium 100 mg/Dextrose 100 ml @  100 mls/hr Q24H IV  Last 

administered on 4/30/20at 08:18;  Start 3/23/20 at 09:00;  Stop 4/30/20 at 

20:58;  Status DC


Propofol 0 ml @ As Directed STK-MED ONCE IV ;  Start 3/23/20 at 07:53;  Stop 

3/23/20 at 07:53;  Status DC


Etomidate (Amidate) 20 mg STK-MED ONCE IV ;  Start 3/23/20 at 07:53;  Stop 

3/23/20 at 07:54;  Status DC


Midazolam HCl (Versed) 5 mg STK-MED ONCE .ROUTE ;  Start 3/23/20 at 07:57;  Stop

3/23/20 at 07:57;  Status DC


Fentanyl Citrate 30 ml @ 0 mls/hr CONT  PRN IV SEE PROTOCOL Last administered on

4/17/20at 06:12;  Start 3/23/20 at 08:15;  Stop 4/17/20 at 09:19;  Status DC


Artificial Tears (Artificial Tears) 1 drop PRN Q1HR  PRN OU DRY EYE, 1st choice;

 Start 3/23/20 at 08:15;  Stop 4/29/20 at 05:31;  Status DC


Midazolam HCl 50 mg/Sodium Chloride 50 ml @ 0 mls/hr CONT  PRN IV SEE PROTOCOL 

Last administered on 3/26/20at 22:39;  Start 3/23/20 at 08:15;  Stop 3/28/20 at 

15:59;  Status DC


Etomidate (Amidate) 8 mg 1X  ONCE IV  Last administered on 3/23/20at 08:33;  

Start 3/23/20 at 08:30;  Stop 3/23/20 at 08:31;  Status DC


Succinylcholine Chloride (Anectine) 120 mg 1X  ONCE IV  Last administered on 

3/23/20at 08:34;  Start 3/23/20 at 08:30;  Stop 3/23/20 at 08:31;  Status DC


Midazolam HCl (Versed) 5 mg 1X  ONCE IV ;  Start 3/23/20 at 08:30;  Stop 3/23/20

at 08:31;  Status DC


Potassium Chloride 15 meq/ Bicarbonate Dialysis Soln w/ out KCl 5,007.5 ml  @ 

1,000 mls/ hr Q5H1M IV  Last administered on 3/24/20at 11:11;  Start 3/23/20 at 

12:00;  Stop 3/24/20 at 11:15;  Status DC


Potassium Chloride 15 meq/ Bicarbonate Dialysis Soln w/ out KCl 5,007.5 ml  @ 

1,000 mls/ hr Q5H1M IV  Last administered on 3/24/20at 11:12;  Start 3/23/20 at 

12:00;  Stop 3/24/20 at 11:17;  Status DC


Potassium Chloride 15 meq/ Bicarbonate Dialysis Soln w/ out KCl 5,007.5 ml  @ 

1,000 mls/ hr Q5H1M IV  Last administered on 3/24/20at 11:11;  Start 3/23/20 at 

12:00;  Stop 3/24/20 at 11:19;  Status DC


Sodium Chloride 90 meq/Potassium Chloride 15 meq/ Potassium Phosphate 10 mmol/ 

Magnesium Sulfate 10 meq/Calcium Gluconate 20 meq/ Multivitamins 10 ml/Chromium/

Copper/Manganese/ Seleni/Zn 0.5 ml/ Total Parenteral Nutrition/Amino 

Acids/Dextrose/ Fat Emulsion Intravenous 1,400 ml @  58.333 mls/ hr TPN  CONT IV

 Last administered on 3/23/20at 21:42;  Start 3/23/20 at 22:00;  Stop 3/24/20 at

21:59;  Status DC


Heparin Sodium (Porcine) (Heparin Sodium) 5,000 unit Q8HRS SQ  Last administered

on 3/28/20at 05:55;  Start 3/23/20 at 15:00;  Stop 3/28/20 at 13:28;  Status DC


Meropenem 500 mg/ Sodium Chloride 50 ml @  100 mls/hr Q6HRS IV  Last ad

ministered on 3/25/20at 06:00;  Start 3/24/20 at 09:00;  Stop 3/25/20 at 07:29; 

Status DC


Potassium Phosphate 20 mmol/ Sodium Chloride 106.6667 ml @  51.667 m... 1X  ONCE

IV  Last administered on 3/24/20at 11:22;  Start 3/24/20 at 10:15;  Stop 3/24/20

at 12:18;  Status DC


Acetaminophen (Tylenol Supp) 650 mg PRN Q6HRS  PRN [place] MILD PAIN / TEMP > 100.3'F

Last administered on 6/18/20at 10:16;  Start 3/24/20 at 10:30


Potassium Chloride/Water 100 ml @  100 mls/hr Q1H IV  Last administered on 

3/24/20at 12:12;  Start 3/24/20 at 11:00;  Stop 3/24/20 at 12:59;  Status DC


Potassium Chloride 20 meq/ Bicarbonate Dialysis Soln w/ out KCl 5,010 ml @  

1,000 mls/hr Q5H1M IV  Last administered on 3/25/20at 08:48;  Start 3/24/20 at 

12:00;  Stop 3/25/20 at 13:03;  Status DC


Potassium Chloride 20 meq/ Bicarbonate Dialysis Soln w/ out KCl 5,010 ml @  

1,000 mls/hr Q5H1M IV  Last administered on 3/29/20at 14:52;  Start 3/24/20 at 

11:30;  Stop 3/29/20 at 19:59;  Status DC


Potassium Chloride 20 meq/ Bicarbonate Dialysis Soln w/ out KCl 5,010 ml @  

1,000 mls/hr Q5H1M IV  Last administered on 3/29/20at 14:53;  Start 3/24/20 at 

11:30;  Stop 3/29/20 at 19:59;  Status DC


Sodium Chloride 90 meq/Potassium Chloride 15 meq/ Potassium Phosphate 15 mmol/ 

Magnesium Sulfate 10 meq/Calcium Gluconate 15 meq/ Multivitamins 10 ml/Chromium/

Copper/Manganese/ Seleni/Zn 0.5 ml/ Total Parenteral Nutrition/Amino 

Acids/Dextrose/ Fat Emulsion Intravenous 1,400 ml @  58.333 mls/ hr TPN  CONT IV

 Last administered on 3/24/20at 22:17;  Start 3/24/20 at 22:00;  Stop 3/25/20 at

21:59;  Status DC


Cefepime HCl (Maxipime) 2 gm Q12HR IVP  Last administered on 4/7/20at 20:56;  

Start 3/25/20 at 09:00;  Stop 4/8/20 at 09:58;  Status DC


Daptomycin 500 mg/ Sodium Chloride 50 ml @  100 mls/hr Q48H IV  Last 

administered on 4/10/20at 09:57;  Start 3/25/20 at 08:30;  Stop 4/10/20 at 

10:07;  Status DC


Lidocaine HCl (Buffered Lidocaine 1%) 3 ml 1X  ONCE INJ  Last administered on 

3/25/20at 10:27;  Start 3/25/20 at 10:30;  Stop 3/25/20 at 10:31;  Status DC


Potassium Phosphate 20 mmol/ Sodium Chloride 106.6667 ml @  51.667 m... 1X  ONCE

IV  Last administered on 3/25/20at 12:51;  Start 3/25/20 at 13:00;  Stop 3/25/20

at 15:03;  Status DC


Sodium Chloride 90 meq/Potassium Chloride 15 meq/ Potassium Phosphate 18 mmol/ 

Magnesium Sulfate 8 meq/Calcium Gluconate 15 meq/ Multivitamins 10 ml/Chromium/ 

Copper/Manganese/ Seleni/Zn 0.5 ml/ Total Parenteral Nutrition/Amino 

Acids/Dextrose/ Fat Emulsion Intravenous 1,400 ml @  58.333 mls/ hr TPN  CONT IV

 Last administered on 3/25/20at 22:16;  Start 3/25/20 at 22:00;  Stop 3/26/20 at

21:59;  Status DC


Potassium Chloride 20 meq/ Bicarbonate Dialysis Soln w/ out KCl 5,010 ml @  

1,000 mls/hr Q5H1M IV  Last administered on 3/29/20at 14:54;  Start 3/25/20 at 

16:00;  Stop 3/29/20 at 19:59;  Status DC


Multi-Ingred Cream/Lotion/Oil/ Oint (Artificial Tears Eye Ointment) 1 thomas PRN 

Q1HR  PRN OU DRY EYE, 2nd choice Last administered on 4/13/20at 08:19;  Start 

3/25/20 at 17:30;  Stop 6/3/20 at 14:39;  Status DC


Sodium Chloride 90 meq/Potassium Chloride 15 meq/ Potassium Phosphate 18 mmol/ 

Magnesium Sulfate 8 meq/Calcium Gluconate 15 meq/ Multivitamins 10 ml/Chromium/ 

Copper/Manganese/ Seleni/Zn 0.5 ml/ Total Parenteral Nutrition/Amino 

Acids/Dextrose/ Fat Emulsion Intravenous 1,400 ml @  58.333 mls/ hr TPN  CONT IV

 Last administered on 3/26/20at 22:00;  Start 3/26/20 at 22:00;  Stop 3/27/20 at

21:59;  Status DC


Albumin Human 500 ml @  125 mls/hr 1X  ONCE IV ;  Start 3/26/20 at 14:15;  Stop 

3/26/20 at 18:14;  Status DC


Sodium Chloride 90 meq/Potassium Chloride 15 meq/ Potassium Phosphate 18 mmol/ 

Magnesium Sulfate 8 meq/Calcium Gluconate 15 meq/ Multivitamins 10 ml/Chromium/ 

Copper/Manganese/ Seleni/Zn 0.5 ml/ Insulin Human Regular 10 unit/ Total 

Parenteral Nutrition/Amino Acids/Dextrose/ Fat Emulsion Intravenous 1,400 ml @  

58.333 mls/ hr TPN  CONT IV  Last administered on 3/27/20at 21:43;  Start 

3/27/20 at 22:00;  Stop 3/28/20 at 21:59;  Status DC


Lidocaine HCl (Buffered Lidocaine 1%) 3 ml STK-MED ONCE .ROUTE ;  Start 3/25/20 

at 10:00;  Stop 3/27/20 at 13:57;  Status DC


Midazolam HCl 100 mg/Sodium Chloride 100 ml @ 7 mls/hr CONT  PRN IV SEE PROTOCOL

Last administered on 4/8/20at 15:35;  Start 3/28/20 at 16:00;  Stop 6/3/20 at 

14:38;  Status DC


Sodium Chloride 90 meq/Potassium Chloride 15 meq/ Potassium Phosphate 18 mmol/ 

Magnesium Sulfate 8 meq/Calcium Gluconate 15 meq/ Multivitamins 10 ml/Chromium/ 

Copper/Manganese/ Seleni/Zn 0.5 ml/ Insulin Human Regular 15 unit/ Total 

Parenteral Nutrition/Amino Acids/Dextrose/ Fat Emulsion Intravenous 1,400 ml @  

58.333 mls/ hr TPN  CONT IV  Last administered on 3/28/20at 20:34;  Start 

3/28/20 at 22:00;  Stop 3/29/20 at 21:59;  Status DC


Info ([facility] Electrolyte Protocol) 1 ea CONT PRN  PRN MC PER PROTOCOL;  Start 

3/29/20 at 13:15


Sodium Chloride 90 meq/Potassium Chloride 15 meq/ Potassium Phosphate 18 mmol/ 

Magnesium Sulfate 8 meq/Calcium Gluconate 15 meq/ Multivitamins 10 ml/Chromium/ 

Copper/Manganese/ Seleni/Zn 0.5 ml/ Insulin Human Regular 15 unit/ Total 

Parenteral Nutrition/Amino Acids/Dextrose/ Fat Emulsion Intravenous 1,400 ml @  

58.333 mls/ hr TPN  CONT IV  Last administered on 3/29/20at 22:05;  Start 

3/29/20 at 22:00;  Stop 3/30/20 at 21:59;  Status DC


Potassium Chloride 15 meq/ Bicarbonate Dialysis Soln w/ out KCl 5,007.5 ml  @ 

1,000 mls/ hr Q5H1M IV  Last administered on 4/1/20at 18:14;  Start 3/29/20 at 

20:00;  Stop 4/2/20 at 13:08;  Status DC


Potassium Chloride 15 meq/ Bicarbonate Dialysis Soln w/ out KCl 5,007.5 ml  @ 

1,000 mls/ hr Q5H1M IV  Last administered on 4/1/20at 18:14;  Start 3/29/20 at 

20:00;  Stop 4/2/20 at 13:08;  Status DC


Potassium Chloride 15 meq/ Bicarbonate Dialysis Soln w/ out KCl 5,007.5 ml  @ 

1,000 mls/ hr Q5H1M IV  Last administered on 4/1/20at 18:14;  Start 3/29/20 at 

20:00;  Stop 4/2/20 at 13:08;  Status DC


Iohexol (Omnipaque 240 Mg/ml) 30 ml 1X  ONCE PO  Last administered on 3/30/20at 

11:30;  Start 3/30/20 at 11:30;  Stop 3/30/20 at 11:33;  Status DC


Info (CONTRAST GIVEN -- Rx MONITORING) 1 each PRN DAILY  PRN MC SEE COMMENTS;  

Start 3/30/20 at 11:45;  Stop 4/1/20 at 11:44;  Status DC


Sodium Chloride 90 meq/Potassium Chloride 15 meq/ Potassium Phosphate 18 mmol/ 

Magnesium Sulfate 8 meq/Calcium Gluconate 15 meq/ Multivitamins 10 ml/Chromium/ 

Copper/Manganese/ Seleni/Zn 0.5 ml/ Insulin Human Regular 15 unit/ Total 

Parenteral Nutrition/Amino Acids/Dextrose/ Fat Emulsion Intravenous 1,400 ml @  

58.333 mls/ hr TPN  CONT IV  Last administered on 3/30/20at 21:47;  Start 

3/30/20 at 22:00;  Stop 3/31/20 at 21:59;  Status DC


Sodium Chloride 90 meq/Potassium Chloride 15 meq/ Potassium Phosphate 18 mmol/ 

Magnesium Sulfate 8 meq/Calcium Gluconate 15 meq/ Multivitamins 10 ml/Chromium/ 

Copper/Manganese/ Seleni/Zn 0.5 ml/ Insulin Human Regular 20 unit/ Total 

Parenteral Nutrition/Amino Acids/Dextrose/ Fat Emulsion Intravenous 1,400 ml @  

58.333 mls/ hr TPN  CONT IV  Last administered on 3/31/20at 21:36;  Start 

3/31/20 at 22:00;  Stop 4/1/20 at 21:59;  Status DC


Alteplase, Recombinant (Cathflo For Central Catheter Clearance) 1 mg 1X  ONCE 

INT CAT  Last administered on 3/31/20at 20:03;  Start 3/31/20 at 19:30;  Stop 

3/31/20 at 19:46;  Status DC


Alteplase, Recombinant (Cathflo For Central Catheter Clearance) 1 mg 1X  ONCE 

INT CAT  Last administered on 3/31/20at 22:05;  Start 3/31/20 at 22:00;  Stop 

3/31/20 at 22:01;  Status DC


Sodium Chloride 90 meq/Potassium Chloride 15 meq/ Potassium Phosphate 18 mmol/ 

Magnesium Sulfate 8 meq/Calcium Gluconate 15 meq/ Multivitamins 10 ml/Chromium/ 

Copper/Manganese/ Seleni/Zn 0.5 ml/ Insulin Human Regular 20 unit/ Total 

Parenteral Nutrition/Amino Acids/Dextrose/ Fat Emulsion Intravenous 1,400 ml @  

58.333 mls/ hr TPN  CONT IV  Last administered on 4/1/20at 21:30;  Start 4/1/20 

at 22:00;  Stop 4/2/20 at 21:59;  Status DC


Dexmedetomidine HCl 400 mcg/ Sodium Chloride 100 ml @ 0 mls/hr CONT  PRN IV 

ANXIETY / AGITATION Last administered on 5/30/20at 12:57;  Start 4/2/20 at 

08:15;  Stop 5/30/20 at 18:31;  Status DC


Sodium Chloride 500 ml @  500 mls/hr 1X PRN  PRN IV ELEVATED BP, SEE COMMENTS;  

Start 4/2/20 at 08:15


Atropine Sulfate (ATROPINE 0.5mg SYRINGE) 0.5 mg PRN Q5MIN  PRN IV SEE COMMENTS;

 Start 4/2/20 at 08:15


Furosemide (Lasix) 20 mg 1X  ONCE IVP  Last administered on 4/2/20at 08:19;  

Start 4/2/20 at 08:15;  Stop 4/2/20 at 08:16;  Status DC


Lidocaine HCl (Buffered Lidocaine 1%) 3 ml STK-MED ONCE .ROUTE ;  Start 4/2/20 

at 08:39;  Stop 4/2/20 at 08:39;  Status DC


Lidocaine HCl (Buffered Lidocaine 1%) 6 ml 1X  ONCE INJ  Last administered on 

4/2/20at 09:05;  Start 4/2/20 at 09:00;  Stop 4/2/20 at 09:06;  Status DC


Sodium Chloride 90 meq/Potassium Chloride 15 meq/ Potassium Phosphate 18 mmol/ 

Magnesium Sulfate 8 meq/Calcium Gluconate 15 meq/ Multivitamins 10 ml/Chromium/ 

Copper/Manganese/ Seleni/Zn 0.5 ml/ Insulin Human Regular 20 unit/ Total 

Parenteral Nutrition/Amino Acids/Dextrose/ Fat Emulsion Intravenous 1,400 ml @  

58.333 mls/ hr TPN  CONT IV  Last administered on 4/2/20at 22:45;  Start 4/2/20 

at 22:00;  Stop 4/3/20 at 21:59;  Status DC


Sodium Chloride 1,000 ml @  1,000 mls/hr Q1H PRN IV hypotension;  Start 4/3/20 

at 07:30;  Stop 4/3/20 at 13:29;  Status DC


Albumin Human 200 ml @  200 mls/hr 1X PRN  PRN IV Hypotension Last administered 

on 4/3/20at 09:36;  Start 4/3/20 at 07:30;  Stop 4/3/20 at 13:29;  Status DC


Sodium Chloride (Normal Saline Flush) 10 ml 1X PRN  PRN IV AP catheter pack;  

Start 4/3/20 at 07:30;  Stop 4/3/20 at 21:29;  Status DC


Sodium Chloride (Normal Saline Flush) 10 ml 1X PRN  PRN IV  catheter pack;  

Start 4/3/20 at 07:30;  Stop 4/4/20 at 07:29;  Status DC


Sodium Chloride 1,000 ml @  400 mls/hr Q2H30M PRN IV PATENCY;  Start 4/3/20 at 

07:30;  Stop 4/3/20 at 19:29;  Status DC


Info (PHARMACY MONITORING -- do not chart) 1 each PRN DAILY  PRN MC SEE 

COMMENTS;  Start 4/3/20 at 07:30;  Stop 4/3/20 at 13:02;  Status DC


Info (PHARMACY MONITORING -- do not chart) 1 each PRN DAILY  PRN MC SEE 

COMMENTS;  Start 4/3/20 at 07:30;  Stop 4/5/20 at 12:45;  Status DC


Sodium Chloride 90 meq/Potassium Chloride 15 meq/ Potassium Phosphate 10 mmol/ 

Magnesium Sulfate 8 meq/Calcium Gluconate 15 meq/ Multivitamins 10 ml/Chromium/ 

Copper/Manganese/ Seleni/Zn 0.5 ml/ Insulin Human Regular 25 unit/ Total Lisa

ral Nutrition/Amino Acids/Dextrose/ Fat Emulsion Intravenous 1,400 ml @  58.333 

mls/ hr TPN  CONT IV  Last administered on 4/3/20at 22:19;  Start 4/3/20 at 

22:00;  Stop 4/4/20 at 21:59;  Status DC


Heparin Sodium (Porcine) (Heparin Sodium) 5,000 unit Q12HR SQ  Last administered

on 4/26/20at 08:59;  Start 4/3/20 at 21:00;  Stop 4/26/20 at 10:05;  Status DC


Ondansetron HCl (Zofran) 4 mg PRN Q6HRS  PRN IV NAUSEA/VOMITING;  Start 4/6/20 

at 07:00;  Stop 4/7/20 at 06:59;  Status DC


Fentanyl Citrate (Fentanyl 2ml Vial) 25 mcg PRN Q5MIN  PRN IV MILD PAIN 1-3;  

Start 4/6/20 at 07:00;  Stop 4/7/20 at 06:59;  Status DC


Fentanyl Citrate (Fentanyl 2ml Vial) 50 mcg PRN Q5MIN  PRN IV MODERATE TO SEVERE

PAIN;  Start 4/6/20 at 07:00;  Stop 4/7/20 at 06:59;  Status DC


Ringer's Solution 1,000 ml @  30 mls/hr Q24H IV ;  Start 4/6/20 at 07:00;  Stop 

4/6/20 at 18:59;  Status DC


Lidocaine HCl (Xylocaine-Mpf 1% 2ml Vial) 2 ml PRN 1X  PRN ID PRIOR TO IV START;

 Start 4/6/20 at 07:00;  Stop 4/7/20 at 06:59;  Status DC


Prochlorperazine Edisylate (Compazine) 5 mg PACU PRN  PRN IV NAUSEA, MRX1;  

Start 4/6/20 at 07:00;  Stop 4/7/20 at 06:59;  Status DC


Sodium Chloride 1,000 ml @  1,000 mls/hr Q1H PRN IV hypotension;  Start 4/4/20 

at 09:10;  Stop 4/4/20 at 15:09;  Status DC


Albumin Human 200 ml @  200 mls/hr 1X PRN  PRN IV Hypotension Last administered 

on 4/4/20at 10:10;  Start 4/4/20 at 09:15;  Stop 4/4/20 at 15:14;  Status DC


Sodium Chloride 1,000 ml @  400 mls/hr Q2H30M PRN IV PATENCY;  Start 4/4/20 at 

09:10;  Stop 4/4/20 at 21:09;  Status DC


Info (PHARMACY MONITORING -- do not chart) 1 each PRN DAILY  PRN MC SEE 

COMMENTS;  Start 4/4/20 at 09:15;  Stop 4/5/20 at 12:45;  Status DC


Info (PHARMACY MONITORING -- do not chart) 1 each PRN DAILY  PRN MC SEE 

COMMENTS;  Start 4/4/20 at 09:15;  Stop 4/5/20 at 12:45;  Status DC


Sodium Chloride 90 meq/Potassium Chloride 15 meq/ Potassium Phosphate 10 mmol/ 

Magnesium Sulfate 8 meq/Calcium Gluconate 15 meq/ Multivitamins 10 ml/Chromium/ 

Copper/Manganese/ Seleni/Zn 0.5 ml/ Insulin Human Regular 25 unit/ Total 

Parenteral Nutrition/Amino Acids/Dextrose/ Fat Emulsion Intravenous 1,400 ml @  

58.333 mls/ hr TPN  CONT IV  Last administered on 4/4/20at 22:10;  Start 4/4/20 

at 22:00;  Stop 4/5/20 at 21:59;  Status DC


Magnesium Sulfate 50 ml @ 25 mls/hr PRN DAILY  PRN IV for Mag < 1.7 on am labs 

Last administered on 6/18/20at 10:57;  Start 4/5/20 at 09:15


Sodium Chloride 90 meq/Potassium Chloride 15 meq/ Potassium Phosphate 10 mmol/ 

Magnesium Sulfate 8 meq/Calcium Gluconate 15 meq/ Multivitamins 10 ml/Chromium/ 

Copper/Manganese/ Seleni/Zn 0.5 ml/ Insulin Human Regular 25 unit/ Total 

Parenteral Nutrition/Amino Acids/Dextrose/ Fat Emulsion Intravenous 1,400 ml @  

58.333 mls/ hr TPN  CONT IV  Last administered on 4/5/20at 21:20;  Start 4/5/20 

at 22:00;  Stop 4/6/20 at 21:59;  Status DC


Sodium Chloride 1,000 ml @  1,000 mls/hr Q1H PRN IV hypotension;  Start 4/5/20 

at 12:23;  Stop 4/5/20 at 18:22;  Status DC


Albumin Human 200 ml @  200 mls/hr 1X  ONCE IV  Last administered on 4/5/20at 

13:34;  Start 4/5/20 at 12:30;  Stop 4/5/20 at 13:29;  Status DC


Diphenhydramine HCl (Benadryl) 25 mg 1X PRN  PRN IV ITCHING;  Start 4/5/20 at 

12:30;  Stop 4/6/20 at 12:29;  Status DC


Diphenhydramine HCl (Benadryl) 25 mg 1X PRN  PRN IV ITCHING;  Start 4/5/20 at 

12:30;  Stop 4/6/20 at 12:29;  Status DC


Info (PHARMACY MONITORING -- do not chart) 1 each PRN DAILY  PRN MC SEE 

COMMENTS;  Start 4/5/20 at 12:30;  Status Cancel


Bupivacaine HCl/ Epinephrine Bitart (Sensorcain-Epi 0.5%-1:[id number] Mpf) 30 ml 

STK-MED ONCE .ROUTE  Last administered on 4/6/20at 11:44;  Start 4/6/20 at 

11:00;  Stop 4/6/20 at 11:01;  Status DC


Cellulose (Surgicel Fibrillar 1x2) 1 each STK-MED ONCE .ROUTE ;  Start 4/6/20 at

11:00;  Stop 4/6/20 at 11:01;  Status DC


Sodium Chloride 90 meq/Potassium Chloride 15 meq/ Potassium Phosphate 10 mmol/ 

Magnesium Sulfate 12 meq/Calcium Gluconate 15 meq/ Multivitamins 10 ml/Chromium/

Copper/Manganese/ Seleni/Zn 0.5 ml/ Insulin Human Regular 25 unit/ Total 

Parenteral Nutrition/Amino Acids/Dextrose/ Fat Emulsion Intravenous 1,400 ml @  

58.333 mls/ hr TPN  CONT IV  Last administered on 4/6/20at 22:24;  Start 4/6/20 

at 22:00;  Stop 4/7/20 at 21:59;  Status DC


Propofol 20 ml @ As Directed STK-MED ONCE IV ;  Start 4/6/20 at 11:07;  Stop 

4/6/20 at 11:07;  Status DC


Cellulose (Surgicel Hemostat 4x8) 1 each STK-MED ONCE .ROUTE  Last administered 

on 4/6/20at 11:44;  Start 4/6/20 at 11:55;  Stop 4/6/20 at 11:56;  Status DC


Sevoflurane (Ultane) 60 ml STK-MED ONCE IH ;  Start 4/6/20 at 12:46;  Stop 

4/6/20 at 12:46;  Status DC


Sodium Chloride 1,000 ml @  1,000 mls/hr Q1H PRN IV hypotension;  Start 4/6/20 

at 13:51;  Stop 4/6/20 at 19:50;  Status DC


Albumin Human 200 ml @  200 mls/hr 1X PRN  PRN IV Hypotension Last administered 

on 4/6/20at 14:51;  Start 4/6/20 at 14:00;  Stop 4/6/20 at 19:59;  Status DC


Diphenhydramine HCl (Benadryl) 25 mg 1X PRN  PRN IV ITCHING;  Start 4/6/20 at 

14:00;  Stop 4/7/20 at 13:59;  Status DC


Diphenhydramine HCl (Benadryl) 25 mg 1X PRN  PRN IV ITCHING;  Start 4/6/20 at 

14:00;  Stop 4/7/20 at 13:59;  Status DC


Sodium Chloride 1,000 ml @  400 mls/hr Q2H30M PRN IV PATENCY;  Start 4/6/20 at 

13:51;  Stop 4/7/20 at 01:50;  Status DC


Info (PHARMACY MONITORING -- do not chart) 1 each PRN DAILY  PRN MC SEE 

COMMENTS;  Start 4/6/20 at 14:00;  Stop 4/9/20 at 08:16;  Status DC


Heparin Sodium (Porcine) (Hep Lock Adult) 500 unit STK-MED ONCE IVP ;  Start 

4/7/20 at 09:29;  Stop 4/7/20 at 09:30;  Status DC


Sodium Chloride 1,000 ml @  1,000 mls/hr Q1H PRN IV hypotension;  Start 4/7/20 

at 10:43;  Stop 4/7/20 at 16:42;  Status DC


Sodium Chloride 1,000 ml @  400 mls/hr Q2H30M PRN IV PATENCY;  Start 4/7/20 at 

10:43;  Stop 4/7/20 at 22:42;  Status DC


Info (PHARMACY MONITORING -- do not chart) 1 each PRN DAILY  PRN MC SEE 

COMMENTS;  Start 4/7/20 at 10:45;  Status UNV


Info (PHARMACY MONITORING -- do not chart) 1 each PRN DAILY  PRN MC SEE 

COMMENTS;  Start 4/7/20 at 10:45;  Status UNV


Sodium Chloride 90 meq/Potassium Chloride 15 meq/ Magnesium Sulfate 12 

meq/Calcium Gluconate 15 meq/ Multivitamins 10 ml/Chromium/ Copper/Manganese/ 

Seleni/Zn 0.5 ml/ Insulin Human Regular 25 unit/ Total Parenteral 

Nutrition/Amino Acids/Dextrose/ Fat Emulsion Intravenous 1,400 ml @  58.333 mls/

hr TPN  CONT IV  Last administered on 4/7/20at 22:13;  Start 4/7/20 at 22:00;  

Stop 4/8/20 at 21:59;  Status DC


Sodium Chloride 1,000 ml @  1,000 mls/hr Q1H PRN IV hypotension;  Start 4/8/20 

at 07:50;  Stop 4/8/20 at 13:49;  Status DC


Albumin Human 200 ml @  200 mls/hr 1X  ONCE IV ;  Start 4/8/20 at 08:00;  Stop 

4/8/20 at 08:53;  Status DC


Diphenhydramine HCl (Benadryl) 25 mg 1X PRN  PRN IV ITCHING;  Start 4/8/20 at 

08:00;  Stop 4/9/20 at 07:59;  Status DC


Diphenhydramine HCl (Benadryl) 25 mg 1X PRN  PRN IV ITCHING;  Start 4/8/20 at 

08:00;  Stop 4/9/20 at 07:59;  Status DC


Info (PHARMACY MONITORING -- do not chart) 1 each PRN DAILY  PRN MC SEE VITO

NTS;  Start 4/8/20 at 08:00;  Stop 4/9/20 at 08:16;  Status DC


Albumin Human 50 ml @ 50 mls/hr 1X  ONCE IV ;  Start 4/8/20 at 08:53;  Stop 

4/8/20 at 08:56;  Status DC


Albumin Human 200 ml @  50 mls/hr PRN 1X  PRN IV HYPOTENSION Last administered 

on 4/14/20at 11:54;  Start 4/8/20 at 09:00;  Stop 5/21/20 at 11:14;  Status DC


Meropenem 500 mg/ Sodium Chloride 50 ml @  100 mls/hr Q12H IV  Last administered

on 4/28/20at 10:45;  Start 4/8/20 at 10:00;  Stop 4/28/20 at 12:37;  Status DC


Sodium Chloride 90 meq/Magnesium Sulfate 12 meq/ Calcium Gluconate 15 meq/ 

Multivitamins 10 ml/Chromium/ Copper/Manganese/ Seleni/Zn 0.5 ml/ Insulin Human 

Regular 25 unit/ Total Parenteral Nutrition/Amino Acids/Dextrose/ Fat Emulsion 

Intravenous 1,400 ml @  58.333 mls/ hr TPN  CONT IV  Last administered on 

4/8/20at 21:41;  Start 4/8/20 at 22:00;  Stop 4/9/20 at 21:59;  Status DC


Sodium Chloride 1,000 ml @  1,000 mls/hr Q1H PRN IV hypotension;  Start 4/9/20 

at 07:58;  Stop 4/9/20 at 13:57;  Status DC


Albumin Human 200 ml @  200 mls/hr 1X PRN  PRN IV Hypotension Last administered 

on 4/9/20at 09:30;  Start 4/9/20 at 08:00;  Stop 4/9/20 at 13:59;  Status DC


Sodium Chloride 1,000 ml @  400 mls/hr Q2H30M PRN IV PATENCY;  Start 4/9/20 at 

07:58;  Stop 4/9/20 at 19:57;  Status DC


Info (PHARMACY MONITORING -- do not chart) 1 each PRN DAILY  PRN MC SEE 

COMMENTS;  Start 4/9/20 at 08:00;  Status Cancel


Info (PHARMACY MONITORING -- do not chart) 1 each PRN DAILY  PRN MC SEE 

COMMENTS;  Start 4/9/20 at 08:15;  Status UNV


Sodium Chloride 90 meq/Potassium Phosphate 5 mmol/ Magnesium Sulfate 12 

meq/Calcium Gluconate 15 meq/ Multivitamins 10 ml/Chromium/ Copper/Manganese/ 

Seleni/Zn 0.5 ml/ Insulin Human Regular 30 unit/ Total Parenteral 

Nutrition/Amino Acids/Dextrose/ Fat Emulsion Intravenous 1,400 ml @  58.333 mls/

hr TPN  CONT IV  Last administered on 4/9/20at 22:08;  Start 4/9/20 at 22:00;  

Stop 4/10/20 at 21:59;  Status DC


Linezolid/Dextrose 300 ml @  300 mls/hr Q12HR IV  Last administered on 4/20/20at

20:40;  Start 4/10/20 at 11:00;  Stop 4/21/20 at 08:10;  Status DC


Sodium Chloride 90 meq/Potassium Phosphate 15 mmol/ Magnesium Sulfate 12 

meq/Calcium Gluconate 15 meq/ Multivitamins 10 ml/Chromium/ Copper/Manganese/ 

Seleni/Zn 0.5 ml/ Insulin Human Regular 30 unit/ Total Parenteral 

Nutrition/Amino Acids/Dextrose/ Fat Emulsion Intravenous 1,400 ml @  58.333 mls/

hr TPN  CONT IV  Last administered on 4/10/20at 21:49;  Start 4/10/20 at 22:00; 

Stop 4/11/20 at 21:59;  Status DC


Sodium Chloride 90 meq/Potassium Phosphate 15 mmol/ Magnesium Sulfate 12 

meq/Calcium Gluconate 15 meq/ Multivitamins 10 ml/Chromium/ Copper/Manganese/ 

Seleni/Zn 0.5 ml/ Insulin Human Regular 40 unit/ Total Parenteral 

Nutrition/Amino Acids/Dextrose/ Fat Emulsion Intravenous 1,400 ml @  58.333 mls/

hr TPN  CONT IV  Last administered on 4/11/20at 21:21;  Start 4/11/20 at 22:00; 

Stop 4/12/20 at 21:59;  Status DC


Sodium Chloride 1,000 ml @  1,000 mls/hr Q1H PRN IV hypotension;  Start 4/11/20 

at 13:26;  Stop 4/11/20 at 19:25;  Status DC


Albumin Human 200 ml @  200 mls/hr 1X PRN  PRN IV Hypotension Last administered 

on 4/11/20at 15:00;  Start 4/11/20 at 13:30;  Stop 4/11/20 at 19:29;  Status DC


Sodium Chloride (Normal Saline Flush) 10 ml 1X PRN  PRN IV AP catheter pack;  

Start 4/11/20 at 13:30;  Stop 4/12/20 at 13:29;  Status DC


Sodium Chloride (Normal Saline Flush) 10 ml 1X PRN  PRN IV  catheter pack;  

Start 4/11/20 at 13:30;  Stop 4/12/20 at 13:29;  Status DC


Sodium Chloride 1,000 ml @  400 mls/hr Q2H30M PRN IV PATENCY;  Start 4/11/20 at 

13:26;  Stop 4/12/20 at 01:25;  Status DC


Info (PHARMACY MONITORING -- do not chart) 1 each PRN DAILY  PRN MC SEE 

COMMENTS;  Start 4/11/20 at 13:30;  Stop 4/11/20 at 13:33;  Status DC


Info (PHARMACY MONITORING -- do not chart) 1 each PRN DAILY  PRN MC SEE 

COMMENTS;  Start 4/11/20 at 13:30;  Stop 4/11/20 at 13:34;  Status DC


Sodium Chloride 90 meq/Potassium Phosphate 19 mmol/ Magnesium Sulfate 12 

meq/Calcium Gluconate 15 meq/ Multivitamins 10 ml/Chromium/ Copper/Manganese/ 

Seleni/Zn 0.5 ml/ Insulin Human Regular 40 unit/ Total Parenteral 

Nutrition/Amino Acids/Dextrose/ Fat Emulsion Intravenous 1,400 ml @  58.333 mls/

hr TPN  CONT IV  Last administered on 4/12/20at 21:54;  Start 4/12/20 at 22:00; 

Stop 4/13/20 at 21:59;  Status DC


Sodium Chloride 1,000 ml @  1,000 mls/hr Q1H PRN IV hypotension;  Start 4/13/20 

at 09:35;  Stop 4/13/20 at 15:34;  Status DC


Albumin Human 200 ml @  200 mls/hr 1X PRN  PRN IV Hypotension;  Start 4/13/20 at

09:45;  Stop 4/13/20 at 15:44;  Status DC


Diphenhydramine HCl (Benadryl) 25 mg 1X PRN  PRN IV ITCHING;  Start 4/13/20 at 

09:45;  Stop 4/14/20 at 09:44;  Status DC


Diphenhydramine HCl (Benadryl) 25 mg 1X PRN  PRN IV ITCHING;  Start 4/13/20 at 

09:45;  Stop 4/14/20 at 09:44;  Status DC


Sodium Chloride 1,000 ml @  400 mls/hr Q2H30M PRN IV PATENCY;  Start 4/13/20 at 

09:35;  Stop 4/13/20 at 21:34;  Status DC


Info (PHARMACY MONITORING -- do not chart) 1 each PRN DAILY  PRN MC SEE 

COMMENTS;  Start 4/13/20 at 09:45;  Status Cancel


Sodium Chloride 100 meq/Potassium Phosphate 19 mmol/ Magnesium Sulfate 12 

meq/Calcium Gluconate 15 meq/ Multivitamins 10 ml/Chromium/ Copper/Manganese/ 

Seleni/Zn 0.5 ml/ Insulin Human Regular 40 unit/ Potassium Chloride 20 meq/ 

Total Parenteral Nutrition/Amino Acids/Dextrose/ Fat Emulsion Intravenous 1,400 

ml @  58.333 mls/ hr TPN  CONT IV  Last administered on 4/13/20at 22:02;  Start 

4/13/20 at 22:00;  Stop 4/14/20 at 21:59;  Status DC


Furosemide (Lasix) 40 mg 1X  ONCE IVP  Last administered on 4/13/20at 14:39;  

Start 4/13/20 at 14:30;  Stop 4/13/20 at 14:31;  Status DC


Metronidazole 100 ml @  100 mls/hr Q8HRS IV  Last administered on 4/21/20at 

06:04;  Start 4/14/20 at 10:00;  Stop 4/21/20 at 08:10;  Status DC


Sodium Chloride 1,000 ml @  1,000 mls/hr Q1H PRN IV hypotension;  Start 4/14/20 

at 08:00;  Stop 4/14/20 at 13:59;  Status DC


Albumin Human 200 ml @  200 mls/hr 1X PRN  PRN IV Hypotension;  Start 4/14/20 at

08:00;  Stop 4/14/20 at 13:59;  Status DC


Sodium Chloride 1,000 ml @  400 mls/hr Q2H30M PRN IV PATENCY;  Start 4/14/20 at 

08:00;  Stop 4/14/20 at 19:59;  Status DC


Info (PHARMACY MONITORING -- do not chart) 1 each PRN DAILY  PRN MC SEE 

COMMENTS;  Start 4/14/20 at 11:30;  Status UNV


Info (PHARMACY MONITORING -- do not chart) 1 each PRN DAILY  PRN MC SEE 

COMMENTS;  Start 4/14/20 at 11:30;  Stop 4/16/20 at 12:13;  Status DC


Sodium Chloride 100 meq/Potassium Phosphate 19 mmol/ Magnesium Sulfate 12 

meq/Calcium Gluconate 15 meq/ Multivitamins 10 ml/Chromium/ Copper/Manganese/ 

Seleni/Zn 0.5 ml/ Insulin Human Regular 40 unit/ Potassium Chloride 20 meq/ 

Total Parenteral Nutrition/Amino Acids/Dextrose/ Fat Emulsion Intravenous 1,400 

ml @  58.333 mls/ hr TPN  CONT IV  Last administered on 4/14/20at 21:52;  Start 

4/14/20 at 22:00;  Stop 4/15/20 at 21:59;  Status DC


Sodium Chloride (Normal Saline Flush) 10 ml QSHIFT  PRN IV AFTER MEDS AND BLOOD 

DRAWS;  Start 4/14/20 at 15:00;  Stop 5/12/20 at 11:27;  Status DC


Sodium Chloride (Normal Saline Flush) 10 ml PRN Q5MIN  PRN IV AFTER MEDS AND 

BLOOD DRAWS;  Start 4/14/20 at 15:00


Sodium Chloride (Normal Saline Flush) 20 ml PRN Q5MIN  PRN IV AFTER MEDS AND 

BLOOD DRAWS;  Start 4/14/20 at 15:00


Sodium Chloride 100 meq/Potassium Phosphate 19 mmol/ Magnesium Sulfate 12 

meq/Calcium Gluconate 15 meq/ Multivitamins 10 ml/Chromium/ Copper/Manganese/ 

Seleni/Zn 0.5 ml/ Insulin Human Regular 40 unit/ Potassium Chloride 20 meq/ 

Total Parenteral Nutrition/Amino Acids/Dextrose/ Fat Emulsion Intravenous 1,400 

ml @  58.333 mls/ hr TPN  CONT IV  Last administered on 4/15/20at 21:20;  Start 

4/15/20 at 22:00;  Stop 4/16/20 at 21:59;  Status DC


Lidocaine HCl (Buffered Lidocaine 1%) 3 ml STK-MED ONCE .ROUTE ;  Start 4/15/20 

at 13:16;  Stop 4/15/20 at 13:16;  Status DC


Lidocaine HCl (Buffered Lidocaine 1%) 6 ml 1X  ONCE INJ  Last administered on 

4/15/20at 13:45;  Start 4/15/20 at 13:30;  Stop 4/15/20 at 13:31;  Status DC


Albumin Human 100 ml @  100 mls/hr 1X  ONCE IV  Last administered on 4/15/20at 

15:41;  Start 4/15/20 at 15:00;  Stop 4/15/20 at 15:59;  Status DC


Albumin Human 50 ml @ 50 mls/hr 1X  ONCE IV  Last administered on 4/15/20at 

15:00;  Start 4/15/20 at 15:00;  Stop 4/15/20 at 15:59;  Status DC


Info (PHARMACY MONITORING -- do not chart) 1 each PRN DAILY  PRN MC SEE 

COMMENTS;  Start 4/16/20 at 11:30;  Status Cancel


Info (PHARMACY MONITORING -- do not chart) 1 each PRN DAILY  PRN MC SEE 

COMMENTS;  Start 4/16/20 at 11:30;  Status UNV


Sodium Chloride 100 meq/Potassium Phosphate 10 mmol/ Magnesium Sulfate 12 

meq/Calcium Gluconate 15 meq/ Multivitamins 10 ml/Chromium/ Copper/Manganese/ 

Seleni/Zn 0.5 ml/ Insulin Human Regular 35 unit/ Potassium Chloride 20 meq/ 

Total Parenteral Nutrition/Amino Acids/Dextrose/ Fat Emulsion Intravenous 1,400 

ml @  58.333 mls/ hr TPN  CONT IV  Last administered on 4/16/20at 22:10;  Start 

4/16/20 at 22:00;  Stop 4/17/20 at 21:59;  Status DC


Sodium Chloride 100 meq/Potassium Phosphate 5 mmol/ Magnesium Sulfate 12 

meq/Calcium Gluconate 15 meq/ Multivitamins 10 ml/Chromium/ Copper/Manganese/ 

Seleni/Zn 0.5 ml/ Insulin Human Regular 35 unit/ Potassium Chloride 20 meq/ 

Total Parenteral Nutrition/Amino Acids/Dextrose/ Fat Emulsion Intravenous 1,400 

ml @  58.333 mls/ hr TPN  CONT IV  Last administered on 4/17/20at 22:59;  Start 

4/17/20 at 22:00;  Stop 4/18/20 at 21:59;  Status DC


Sodium Chloride 1,000 ml @  1,000 mls/hr Q1H PRN IV hypotension;  Start 4/18/20 

at 08:27;  Stop 4/18/20 at 14:26;  Status DC


Albumin Human 200 ml @  200 mls/hr 1X PRN  PRN IV Hypotension Last administered 

on 4/18/20at 09:18;  Start 4/18/20 at 08:30;  Stop 4/18/20 at 14:29;  Status DC


Sodium Chloride 1,000 ml @  400 mls/hr Q2H30M PRN IV PATENCY;  Start 4/18/20 at 

08:27;  Stop 4/18/20 at 20:26;  Status DC


Info (PHARMACY MONITORING -- do not chart) 1 each PRN DAILY  PRN MC SEE 

COMMENTS;  Start 4/18/20 at 08:30;  Status Cancel


Info (PHARMACY MONITORING -- do not chart) 1 each PRN DAILY  PRN MC SEE 

COMMENTS;  Start 4/18/20 at 08:30;  Stop 4/26/20 at 13:10;  Status DC


Sodium Chloride 100 meq/Potassium Chloride 40 meq/ Magnesium Sulfate 15 

meq/Calcium Gluconate 15 meq/ Multivitamins 10 ml/Chromium/ Copper/Manganese/ 

Seleni/Zn 0.5 ml/ Insulin Human Regular 35 unit/ Total Parenteral 

Nutrition/Amino Acids/Dextrose/ Fat Emulsion Intravenous 1,400 ml @  58.333 mls/

hr TPN  CONT IV  Last administered on 4/18/20at 22:00;  Start 4/18/20 at 22:00; 

Stop 4/19/20 at 21:59;  Status DC


Potassium Chloride/Water 100 ml @  100 mls/hr 1X  ONCE IV  Last administered on 

4/18/20at 17:28;  Start 4/18/20 at 14:45;  Stop 4/18/20 at 15:44;  Status DC


Sodium Chloride 100 meq/Potassium Chloride 40 meq/ Magnesium Sulfate 15 

meq/Calcium Gluconate 15 meq/ Multivitamins 10 ml/Chromium/ Copper/Manganese/ 

Seleni/Zn 0.5 ml/ Insulin Human Regular 35 unit/ Total Parenteral 

Nutrition/Amino Acids/Dextrose/ Fat Emulsion Intravenous 1,400 ml @  58.333 mls/

hr TPN  CONT IV  Last administered on 4/19/20at 22:46;  Start 4/19/20 at 22:00; 

Stop 4/20/20 at 21:59;  Status DC


Sodium Chloride 100 meq/Potassium Chloride 40 meq/ Magnesium Sulfate 20 

meq/Calcium Gluconate 15 meq/ Multivitamins 10 ml/Chromium/ Copper/Manganese/ 

Seleni/Zn 0.5 ml/ Insulin Human Regular 35 unit/ Total Parenteral 

Nutrition/Amino Acids/Dextrose/ Fat Emulsion Intravenous 1,400 ml @  58.333 mls/

hr TPN  CONT IV  Last administered on 4/20/20at 22:31;  Start 4/20/20 at 22:00; 

Stop 4/21/20 at 21:59;  Status DC


Fentanyl Citrate (Fentanyl 2ml Vial) 50 mcg PRN Q2HR  PRN IVP PAIN Last 

administered on 4/27/20at 13:32;  Start 4/20/20 at 21:00;  Stop 4/28/20 at 

12:53;  Status DC


Fentanyl Citrate (Fentanyl 2ml Vial) 25 mcg PRN Q2HR  PRN IVP PAIN;  Start 

4/20/20 at 21:00;  Stop 4/28/20 at 12:54;  Status DC


Enoxaparin Sodium (Lovenox 100mg Syringe) 100 mg Q12HR SQ ;  Start 4/21/20 at 

21:00;  Status UNV


Amino Acids/ Glycerin/ Electrolytes 1,000 ml @  75 mls/hr [id number] IV ;  Start 

4/20/20 at 21:15;  Status UNV


Sodium Chloride 1,000 ml @  1,000 mls/hr Q1H PRN IV hypotension;  Start 4/21/20 

at 07:56;  Stop 4/21/20 at 13:55;  Status DC


Albumin Human 200 ml @  200 mls/hr 1X PRN  PRN IV Hypotension Last administered 

on 4/21/20at 08:40;  Start 4/21/20 at 08:00;  Stop 4/21/20 at 13:59;  Status DC


Sodium Chloride 1,000 ml @  400 mls/hr Q2H30M PRN IV PATENCY;  Start 4/21/20 at 

07:56;  Stop 4/21/20 at 19:55;  Status DC


Info (PHARMACY MONITORING -- do not chart) 1 each PRN DAILY  PRN MC SEE 

COMMENTS;  Start 4/21/20 at 08:00;  Status UNV


Info (PHARMACY MONITORING -- do not chart) 1 each PRN DAILY  PRN MC SEE 

COMMENTS;  Start 4/21/20 at 08:00;  Status UNV


Daptomycin 430 mg/ Sodium Chloride 50 ml @  100 mls/hr Q24H IV  Last 

administered on 4/21/20at 12:35;  Start 4/21/20 at 09:00;  Stop 4/21/20 at 

12:49;  Status DC


Sodium Chloride 100 meq/Potassium Chloride 40 meq/ Magnesium Sulfate 20 

meq/Calcium Gluconate 15 meq/ Multivitamins 10 ml/Chromium/ Copper/Manganese/ 

Seleni/Zn 0.5 ml/ Insulin Human Regular 35 unit/ Total Parenteral 

Nutrition/Amino Acids/Dextrose/ Fat Emulsion Intravenous 1,400 ml @  58.333 mls/

hr TPN  CONT IV  Last administered on 4/21/20at 21:26;  Start 4/21/20 at 22:00; 

Stop 4/22/20 at 21:59;  Status DC


Daptomycin 430 mg/ Sodium Chloride 50 ml @  100 mls/hr Q48H IV ;  Start 4/23/20 

at 09:00;  Stop 4/22/20 at 11:55;  Status DC


Sodium Chloride 100 meq/Potassium Chloride 40 meq/ Magnesium Sulfate 20 

meq/Calcium Gluconate 15 meq/ Multivitamins 10 ml/Chromium/ Copper/Manganese/ 

Seleni/Zn 0.5 ml/ Insulin Human Regular 35 unit/ Total Parenteral 

Nutrition/Amino Acids/Dextrose/ Fat Emulsion Intravenous 1,400 ml @  58.333 mls/

hr TPN  CONT IV  Last administered on 4/22/20at 22:27;  Start 4/22/20 at 22:00; 

Stop 4/23/20 at 21:59;  Status DC


Daptomycin 430 mg/ Sodium Chloride 50 ml @  100 mls/hr Q24H IV  Last adminis

tered on 4/24/20at 15:07;  Start 4/22/20 at 13:00;  Stop 4/25/20 at 13:15;  

Status DC


Sodium Chloride 100 meq/Potassium Chloride 40 meq/ Magnesium Sulfate 20 

meq/Calcium Gluconate 10 meq/ Multivitamins 10 ml/Chromium/ Copper/Manganese/ 

Seleni/Zn 0.5 ml/ Insulin Human Regular 35 unit/ Total Parenteral 

Nutrition/Amino Acids/Dextrose/ Fat Emulsion Intravenous 1,400 ml @  58.333 mls/

hr TPN  CONT IV  Last administered on 4/24/20at 00:06;  Start 4/23/20 at 22:00; 

Stop 4/24/20 at 21:59;  Status DC


Alteplase, Recombinant (Cathflo For Central Catheter Clearance) 1 mg 1X  ONCE 

INT CAT  Last administered on 4/24/20at 11:44;  Start 4/24/20 at 10:45;  Stop 

4/24/20 at 10:46;  Status DC


Ondansetron HCl (Zofran) 4 mg PRN Q6HRS  PRN IV NAUSEA/VOMITING;  Start 4/27/20 

at 07:00;  Stop 4/28/20 at 06:59;  Status DC


Fentanyl Citrate (Fentanyl 2ml Vial) 25 mcg PRN Q5MIN  PRN IV MILD PAIN 1-3;  

Start 4/27/20 at 07:00;  Stop 4/28/20 at 06:59;  Status DC


Fentanyl Citrate (Fentanyl 2ml Vial) 50 mcg PRN Q5MIN  PRN IV MODERATE TO SEVERE

PAIN Last administered on 4/27/20at 10:17;  Start 4/27/20 at 07:00;  Stop 

4/28/20 at 06:59;  Status DC


Ringer's Solution 1,000 ml @  30 mls/hr Q24H IV ;  Start 4/27/20 at 07:00;  Stop

4/27/20 at 18:59;  Status DC


Lidocaine HCl (Xylocaine-Mpf 1% 2ml Vial) 2 ml PRN 1X  PRN ID PRIOR TO IV START;

 Start 4/27/20 at 07:00;  Stop 4/28/20 at 06:59;  Status DC


Prochlorperazine Edisylate (Compazine) 5 mg PACU PRN  PRN IV NAUSEA, MRX1;  

Start 4/27/20 at 07:00;  Stop 4/28/20 at 06:59;  Status DC


Sodium Acetate 50 meq/Potassium Acetate 55 meq/ Magnesium Sulfate 20 meq/Calcium

Gluconate 10 meq/ Multivitamins 10 ml/Chromium/ Copper/Manganese/ Seleni/Zn 0.5 

ml/ Insulin Human Regular 35 unit/ Total Parenteral Nutrition/Amino 

Acids/Dextrose/ Fat Emulsion Intravenous 1,400 ml @  58.333 mls/ hr TPN  CONT IV

;  Start 4/24/20 at 22:00;  Stop 4/24/20 at 14:15;  Status DC


Sodium Acetate 50 meq/Potassium Acetate 55 meq/ Magnesium Sulfate 20 meq/Calcium

Gluconate 10 meq/ Multivitamins 10 ml/Chromium/ Copper/Manganese/ Seleni/Zn 0.5 

ml/ Insulin Human Regular 35 unit/ Total Parenteral Nutrition/Amino 

Acids/Dextrose/ Fat Emulsion Intravenous 1,800 ml @  75 mls/hr TPN  CONT IV  

Last administered on 4/24/20at 22:38;  Start 4/24/20 at 22:00;  Stop 4/25/20 at 

21:59;  Status DC


Sodium Chloride 1,000 ml @  1,000 mls/hr Q1H PRN IV hypotension;  Start 4/24/20 

at 15:31;  Stop 4/24/20 at 21:30;  Status DC


Diphenhydramine HCl (Benadryl) 25 mg 1X PRN  PRN IV ITCHING;  Start 4/24/20 at 

15:45;  Stop 4/25/20 at 15:44;  Status DC


Diphenhydramine HCl (Benadryl) 25 mg 1X PRN  PRN IV ITCHING;  Start 4/24/20 at 

15:45;  Stop 4/25/20 at 15:44;  Status DC


Sodium Chloride 1,000 ml @  400 mls/hr Q2H30M PRN IV PATENCY;  Start 4/24/20 at 

15:31;  Stop 4/25/20 at 03:30;  Status DC


Info (PHARMACY MONITORING -- do not chart) 1 each PRN DAILY  PRN MC SEE 

COMMENTS;  Start 4/24/20 at 15:45;  Stop 5/26/20 at 14:14;  Status DC


Sodium Acetate 50 meq/Potassium Acetate 55 meq/ Magnesium Sulfate 20 meq/Calcium

Gluconate 10 meq/ Multivitamins 10 ml/Chromium/ Copper/Manganese/ Seleni/Zn 0.5 

ml/ Insulin Human Regular 35 unit/ Total Parenteral Nutrition/Amino 

Acids/Dextrose/ Fat Emulsion Intravenous 1,800 ml @  75 mls/hr TPN  CONT IV  

Last administered on 4/25/20at 22:03;  Start 4/25/20 at 22:00;  Stop 4/26/20 at 

21:59;  Status DC


Daptomycin 430 mg/ Sodium Chloride 50 ml @  100 mls/hr Q24H IV  Last 

administered on 4/30/20at 13:00;  Start 4/25/20 at 13:00;  Stop 4/30/20 at 

20:58;  Status DC


Heparin Sodium (Porcine) 1000 unit/Sodium Chloride 1,001 ml @  1,001 mls/hr 1X  

ONCE IRR ;  Start 4/27/20 at 06:00;  Stop 4/27/20 at 06:59;  Status DC


Potassium Acetate 55 meq/Magnesium Sulfate 20 meq/ Calcium Gluconate 10 meq/ 

Multivitamins 10 ml/Chromium/ Copper/Manganese/ Seleni/Zn 0.5 ml/ Insulin Human 

Regular 35 unit/ Total Parenteral Nutrition/Amino Acids/Dextrose/ Fat Emulsion 

Intravenous 1,920 ml @  80 mls/hr TPN  CONT IV  Last administered on 4/26/20at 

22:10;  Start 4/26/20 at 22:00;  Stop 4/27/20 at 21:59;  Status DC


Dexamethasone Sodium Phosphate (Decadron) 4 mg STK-MED ONCE .ROUTE ;  Start 

4/27/20 at 10:56;  Stop 4/27/20 at 10:57;  Status DC


Ondansetron HCl (Zofran) 4 mg STK-MED ONCE .ROUTE ;  Start 4/27/20 at 10:56;  

Stop 4/27/20 at 10:57;  Status DC


Rocuronium Bromide (Zemuron) 50 mg STK-MED ONCE .ROUTE ;  Start 4/27/20 at 

10:56;  Stop 4/27/20 at 10:57;  Status DC


Fentanyl Citrate (Fentanyl 2ml Vial) 100 mcg STK-MED ONCE .ROUTE ;  Start 

4/27/20 at 10:56;  Stop 4/27/20 at 10:57;  Status DC


Bupivacaine HCl/ Epinephrine Bitart (Sensorcain-Epi 0.5%-1:689846 Mpf) 30 ml 

STK-MED ONCE .ROUTE  Last administered on 4/27/20at 12:01;  Start 4/27/20 at 

10:58;  Stop 4/27/20 at 10:58;  Status DC


Cellulose (Surgicel Hemostat 2x14) 1 each STK-MED ONCE .ROUTE ;  Start 4/27/20 

at 10:58;  Stop 4/27/20 at 10:59;  Status DC


Iohexol (Omnipaque 300 Mg/ml) 50 ml STK-MED ONCE .ROUTE ;  Start 4/27/20 at 

10:58;  Stop 4/27/20 at 10:59;  Status DC


Cellulose (Surgicel Hemostat 4x8) 1 each STK-MED ONCE .ROUTE ;  Start 4/27/20 at

10:58;  Stop 4/27/20 at 10:59;  Status DC


Bisacodyl (Dulcolax Supp) 10 mg STK-MED ONCE .ROUTE ;  Start 4/27/20 at 10:59;  

Stop 4/27/20 at 10:59;  Status DC


Heparin Sodium (Porcine) 1000 unit/Sodium Chloride 1,001 ml @  1,001 mls/hr 1X  

ONCE IRR ;  Start 4/27/20 at 12:00;  Stop 4/27/20 at 12:59;  Status DC


Propofol 20 ml @ As Directed STK-MED ONCE IV ;  Start 4/27/20 at 11:05;  Stop 

4/27/20 at 11:05;  Status DC


Sevoflurane (Ultane) 90 ml STK-MED ONCE IH ;  Start 4/27/20 at 11:05;  Stop 

4/27/20 at 11:05;  Status DC


Sevoflurane (Ultane) 60 ml STK-MED ONCE IH ;  Start 4/27/20 at 12:26;  Stop 

4/27/20 at 12:27;  Status DC


Propofol 20 ml @ As Directed STK-MED ONCE IV ;  Start 4/27/20 at 12:26;  Stop 

4/27/20 at 12:27;  Status DC


Phenylephrine HCl (PHENYLEPHRINE in 0.9% NACL PF) 1 mg STK-MED ONCE IV ;  Start 

4/27/20 at 12:34;  Stop 4/27/20 at 12:34;  Status DC


Heparin Sodium (Porcine) (Heparin Sodium) 5,000 unit Q12HR SQ  Last administered

on 5/6/20at 20:57;  Start 4/27/20 at 21:00;  Stop 5/7/20 at 09:59;  Status DC


Sodium Chloride (Normal Saline Flush) 3 ml QSHIFT  PRN IV AFTER MEDS AND BLOOD 

DRAWS;  Start 4/27/20 at 13:45


Naloxone HCl (Narcan) 0.4 mg PRN Q2MIN  PRN IV SEE INSTRUCTIONS Last 

administered on 6/6/20at 15:15;  Start 4/27/20 at 13:45


Sodium Chloride 1,000 ml @  25 mls/hr Q24H IV  Last administered on 5/26/20at 

13:37;  Start 4/27/20 at 13:37;  Stop 5/29/20 at 13:09;  Status DC


Naloxone HCl (Narcan) 0.4 mg PRN Q2MIN  PRN IV SEE INSTRUCTIONS;  Start 4/27/20 

at 14:30;  Status UNV


Sodium Chloride 1,000 ml @  25 mls/hr Q24H IV ;  Start 4/27/20 at 14:30;  Status

UNV


Hydromorphone HCl 30 ml @ 0 mls/hr CONT PRN  PRN IV PER PROTOCOL Last 

administered on 5/2/20at 16:08;  Start 4/27/20 at 14:30;  Stop 5/4/20 at 08:55; 

Status DC


Potassium Acetate 55 meq/Magnesium Sulfate 20 meq/ Calcium Gluconate 10 meq/ 

Multivitamins 10 ml/Chromium/ Copper/Manganese/ Seleni/Zn 0.5 ml/ Insulin Human 

Regular 35 unit/ Total Parenteral Nutrition/Amino Acids/Dextrose/ Fat Emulsion 

Intravenous 1,920 ml @  80 mls/hr TPN  CONT IV  Last administered on 4/27/20at 

22:01;  Start 4/27/20 at 22:00;  Stop 4/28/20 at 21:59;  Status DC


Bumetanide (Bumex) 2 mg [id number] IV  Last administered on 5/1/20at 13:50;  Start 

4/28/20 at 14:00;  Stop 5/2/20 at 14:10;  Status DC


Meropenem 1 gm/ Sodium Chloride 100 ml @  200 mls/hr Q8HRS IV  Last administered

on 5/22/20at 05:53;  Start 4/28/20 at 14:00;  Stop 5/22/20 at 09:31;  Status DC


Potassium Acetate 55 meq/Magnesium Sulfate 20 meq/ Calcium Gluconate 10 meq/ 

Multivitamins 10 ml/Chromium/ Copper/Manganese/ Seleni/Zn 0.5 ml/ Insulin Human 

Regular 35 unit/ Total Parenteral Nutrition/Amino Acids/Dextrose/ Fat Emulsion 

Intravenous 1,920 ml @  80 mls/hr TPN  CONT IV  Last administered on 4/28/20at 

22:02;  Start 4/28/20 at 22:00;  Stop 4/29/20 at 21:59;  Status DC


Hydromorphone HCl (Dilaudid Standard PCA) 12 mg STK-MED ONCE IV ;  Start 4/27/20

at 14:35;  Stop 4/28/20 at 13:53;  Status DC


Artificial Tears (Artificial Tears) 1 drop PRN Q15MIN  PRN OU DRY EYE Last 

administered on 6/23/20at 21:17;  Start 4/29/20 at 05:30


Hydromorphone HCl (Dilaudid Standard PCA) 12 mg STK-MED ONCE IV ;  Start 4/28/20

at 12:05;  Stop 4/29/20 at 09:15;  Status DC


Potassium Acetate 65 meq/Magnesium Sulfate 20 meq/ Calcium Gluconate 10 meq/ 

Multivitamins 10 ml/Chromium/ Copper/Manganese/ Seleni/Zn 0.5 ml/ Insulin Human 

Regular 30 unit/ Total Parenteral Nutrition/Amino Acids/Dextrose/ Fat Emulsion 

Intravenous 1,920 ml @  80 mls/hr TPN  CONT IV  Last administered on 4/29/20at 

22:22;  Start 4/29/20 at 22:00;  Stop 4/30/20 at 21:59;  Status DC


Cyclobenzaprine HCl (Flexeril) 10 mg PRN Q6HRS  PRN PO MUSCLE SPASMS;  Start 

4/30/20 at 10:45


Potassium Acetate 55 meq/Magnesium Sulfate 20 meq/ Calcium Gluconate 10 meq/ 

Multivitamins 10 ml/Chromium/ Copper/Manganese/ Seleni/Zn 0.5 ml/ Insulin Human 

Regular 30 unit/ Total Parenteral Nutrition/Amino Acids/Dextrose/ Fat Emulsion 

Intravenous 1,920 ml @  80 mls/hr TPN  CONT IV  Last administered on 5/1/20at 

01:00;  Start 4/30/20 at 22:00;  Stop 5/1/20 at 21:59;  Status DC


Magnesium Sulfate 50 ml @ 25 mls/hr 1X  ONCE IV  Last administered on 4/30/20at 

17:18;  Start 4/30/20 at 12:45;  Stop 4/30/20 at 14:44;  Status DC


Potassium Chloride/Water 100 ml @  100 mls/hr 1X  ONCE IV  Last administered on 

5/1/20at 11:27;  Start 5/1/20 at 12:00;  Stop 5/1/20 at 12:59;  Status DC


Hydromorphone HCl (Dilaudid Standard PCA) 12 mg STK-MED ONCE IV ;  Start 4/29/20

at 10:50;  Stop 5/1/20 at 11:02;  Status DC


Hydromorphone HCl (Dilaudid Standard PCA) 12 mg STK-MED ONCE IV ;  Start 4/30/20

at 13:47;  Stop 5/1/20 at 11:03;  Status DC


Potassium Acetate 30 meq/Magnesium Sulfate 20 meq/ Calcium Gluconate 10 meq/ 

Multivitamins 10 ml/Chromium/ Copper/Manganese/ Seleni/Zn 0.5 ml/ Insulin Human 

Regular 30 unit/ Potassium Chloride 30 meq/ Total Parenteral Nutrition/Amino 

Acids/Dextrose/ Fat Emulsion Intravenous 1,920 ml @  80 mls/hr TPN  CONT IV  

Last administered on 5/1/20at 22:34;  Start 5/1/20 at 22:00;  Stop 5/2/20 at 

21:59;  Status DC


Potassium Chloride/Water 100 ml @  100 mls/hr Q1H IV  Last administered on 

5/2/20at 13:05;  Start 5/2/20 at 07:00;  Stop 5/2/20 at 10:59;  Status DC


Magnesium Sulfate 50 ml @ 25 mls/hr 1X  ONCE IV  Last administered on 5/2/20at 

10:34;  Start 5/2/20 at 10:30;  Stop 5/2/20 at 12:29;  Status DC


Potassium Chloride 75 meq/ Magnesium Sulfate 20 meq/Calcium Gluconate 10 meq/ 

Multivitamins 10 ml/Chromium/ Copper/Manganese/ Seleni/Zn 0.5 ml/ Insulin Human 

Regular 30 unit/ Total Parenteral Nutrition/Amino Acids/Dextrose/ Fat Emulsion 

Intravenous 1,920 ml @  80 mls/hr TPN  CONT IV  Last administered on 5/2/20at 

21:51;  Start 5/2/20 at 22:00;  Stop 5/3/20 at 22:00;  Status DC


Potassium Chloride 75 meq/ Magnesium Sulfate 20 meq/Calcium Gluconate 10 meq/ 

Multivitamins 10 ml/Chromium/ Copper/Manganese/ Seleni/Zn 0.5 ml/ Insulin Human 

Regular 25 unit/ Total Parenteral Nutrition/Amino Acids/Dextrose/ Fat Emulsion 

Intravenous 1,920 ml @  80 mls/hr TPN  CONT IV  Last administered on 5/3/20at 

22:04;  Start 5/3/20 at 22:00;  Stop 5/4/20 at 21:59;  Status DC


Hydromorphone HCl (Dilaudid) 0.4 mg PRN Q4HRS  PRN IVP PAIN Last administered on

5/4/20at 10:57;  Start 5/4/20 at 09:00;  Stop 5/4/20 at 18:59;  Status DC


Micafungin Sodium 100 mg/Dextrose 100 ml @  100 mls/hr Q24H IV  Last 

administered on 5/26/20at 12:17;  Start 5/4/20 at 11:00;  Stop 5/27/20 at 09:59;

 Status DC


Daptomycin 485 mg/ Sodium Chloride 50 ml @  100 mls/hr Q24H IV  Last 

administered on 5/11/20at 13:10;  Start 5/4/20 at 11:00;  Stop 5/12/20 at 07:44;

 Status DC


Potassium Chloride 75 meq/ Magnesium Sulfate 15 meq/Calcium Gluconate 8 meq/ 

Multivitamins 10 ml/Chromium/ Copper/Manganese/ Seleni/Zn 0.5 ml/ Insulin Human 

Regular 25 unit/ Total Parenteral Nutrition/Amino Acids/Dextrose/ Fat Emulsion 

Intravenous 1,920 ml @  80 mls/hr TPN  CONT IV  Last administered on 5/4/20at 

23:08;  Start 5/4/20 at 22:00;  Stop 5/5/20 at 21:59;  Status DC


Haloperidol Lactate (Haldol Inj) 3 mg 1X  ONCE IVP  Last administered on 

5/4/20at 14:37;  Start 5/4/20 at 14:30;  Stop 5/4/20 at 14:31;  Status DC


Hydromorphone HCl (Dilaudid) 1 mg PRN Q4HRS  PRN IVP PAIN Last administered on 

5/18/20at 06:25;  Start 5/4/20 at 19:00;  Stop 5/18/20 at 17:10;  Status DC


Potassium Chloride 75 meq/ Magnesium Sulfate 15 meq/Calcium Gluconate 8 meq/ 

Multivitamins 10 ml/Chromium/ Copper/Manganese/ Seleni/Zn 0.5 ml/ Insulin Human 

Regular 20 unit/ Total Parenteral Nutrition/Amino Acids/Dextrose/ Fat Emulsion 

Intravenous 1,920 ml @  80 mls/hr TPN  CONT IV  Last administered on 5/5/20at 

22:10;  Start 5/5/20 at 22:00;  Stop 5/6/20 at 21:59;  Status DC


Lidocaine HCl (Buffered Lidocaine 1%) 3 ml STK-MED ONCE .ROUTE ;  Start 5/6/20 

at 11:31;  Stop 5/6/20 at 11:31;  Status DC


Lidocaine HCl (Buffered Lidocaine 1%) 3 ml STK-MED ONCE .ROUTE ;  Start 5/6/20 

at 12:28;  Stop 5/6/20 at 12:29;  Status DC


Lidocaine HCl (Buffered Lidocaine 1%) 6 ml 1X  ONCE INJ  Last administered on 

5/6/20at 12:53;  Start 5/6/20 at 12:45;  Stop 5/6/20 at 12:46;  Status DC


Potassium Chloride 75 meq/ Magnesium Sulfate 15 meq/Calcium Gluconate 8 meq/ 

Multivitamins 10 ml/Chromium/ Copper/Manganese/ Seleni/Zn 0.5 ml/ Insulin Human 

Regular 20 unit/ Total Parenteral Nutrition/Amino Acids/Dextrose/ Fat Emulsion 

Intravenous 1,920 ml @  80 mls/hr TPN  CONT IV  Last administered on 5/6/20at 

22:00;  Start 5/6/20 at 22:00;  Stop 5/7/20 at 21:59;  Status DC


Potassium Chloride 75 meq/ Magnesium Sulfate 15 meq/Calcium Gluconate 8 meq/ 

Multivitamins 10 ml/Chromium/ Copper/Manganese/ Seleni/Zn 0.5 ml/ Insulin Human 

Regular 15 unit/ Total Parenteral Nutrition/Amino Acids/Dextrose/ Fat Emulsion 

Intravenous 1,920 ml @  80 mls/hr TPN  CONT IV  Last administered on 5/7/20at 

22:28;  Start 5/7/20 at 22:00;  Stop 5/8/20 at 21:59;  Status DC


Vecuronium Bromide (Norcuron Bolus) 6 mg PRN Q6HRS  PRN IV VENT ASYNCHRONY;  

Start 5/7/20 at 19:15;  Stop 5/7/20 at 19:35;  Status DC


Bumetanide (Bumex) 2 mg 1X  ONCE IV  Last administered on 5/7/20at 22:09;  Start

5/7/20 at 19:45;  Stop 5/7/20 at 19:46;  Status DC


Lidocaine HCl (Buffered Lidocaine 1%) 3 ml STK-MED ONCE .ROUTE ;  Start 5/8/20 

at 07:59;  Stop 5/8/20 at 07:59;  Status DC


Midazolam HCl (Versed) 5 mg STK-MED ONCE .ROUTE ;  Start 5/8/20 at 08:36;  Stop 

5/8/20 at 08:36;  Status DC


Fentanyl Citrate (Fentanyl 5ml Vial) 250 mcg STK-MED ONCE .ROUTE ;  Start 5/8/20

at 08:36;  Stop 5/8/20 at 08:37;  Status DC


Lidocaine HCl (Buffered Lidocaine 1%) 3 ml 1X  ONCE IJ  Last administered on 

5/8/20at 09:30;  Start 5/8/20 at 09:15;  Stop 5/8/20 at 09:16;  Status DC


Midazolam HCl (Versed) 5 mg 1X  ONCE IV  Last administered on 5/8/20at 09:30;  

Start 5/8/20 at 09:15;  Stop 5/8/20 at 09:16;  Status DC


Fentanyl Citrate (Fentanyl 5ml Vial) 250 mcg 1X  ONCE IV  Last administered on 

5/8/20at 09:30;  Start 5/8/20 at 09:15;  Stop 5/8/20 at 09:16;  Status DC


Bumetanide (Bumex) 2 mg DAILY IV  Last administered on 5/18/20at 08:07;  Start 

5/8/20 at 10:00;  Stop 5/18/20 at 17:15;  Status DC


Potassium Chloride 75 meq/ Magnesium Sulfate 15 meq/ Multivitamins 10 

ml/Chromium/ Copper/Manganese/ Seleni/Zn 0.5 ml/ Insulin Human Regular 15 unit/ 

Total Parenteral Nutrition/Amino Acids/Dextrose/ Fat Emulsion Intravenous 1,920 

ml @  80 mls/hr TPN  CONT IV  Last administered on 5/8/20at 21:59;  Start 5/8/20

at 22:00;  Stop 5/9/20 at 21:59;  Status DC


Metoclopramide HCl (Reglan Vial) 10 mg PRN Q3HRS  PRN IVP NAUSEA/VOMITING-3rd 

choice Last administered on 5/14/20at 04:25;  Start 5/9/20 at 16:45


Potassium Chloride 75 meq/ Magnesium Sulfate 15 meq/ Multivitamins 10 

ml/Chromium/ Copper/Manganese/ Seleni/Zn 0.5 ml/ Insulin Human Regular 15 unit/ 

Total Parenteral Nutrition/Amino Acids/Dextrose/ Fat Emulsion Intravenous 1,920 

ml @  80 mls/hr TPN  CONT IV  Last administered on 5/9/20at 22:41;  Start 5/9/20

at 22:00;  Stop 5/10/20 at 21:59;  Status DC


Magnesium Sulfate 50 ml @ 25 mls/hr 1X  ONCE IV  Last administered on 5/10/20at 

10:44;  Start 5/10/20 at 09:00;  Stop 5/10/20 at 10:59;  Status DC


Potassium Chloride/Water 100 ml @  100 mls/hr 1X  ONCE IV  Last administered on 

5/10/20at 09:37;  Start 5/10/20 at 09:00;  Stop 5/10/20 at 09:59;  Status DC


Duloxetine HCl (Cymbalta) 30 mg DAILY PO  Last administered on 5/11/20at 09:48; 

Start 5/10/20 at 14:00;  Stop 5/13/20 at 10:25;  Status DC


Potassium Chloride 80 meq/ Magnesium Sulfate 20 meq/ Multivitamins 10 

ml/Chromium/ Copper/Manganese/ Seleni/Zn 0.5 ml/ Insulin Human Regular 15 unit/ 

Total Parenteral Nutrition/Amino Acids/Dextrose/ Fat Emulsion Intravenous 1,920 

ml @  80 mls/hr TPN  CONT IV  Last administered on 5/10/20at 21:42;  Start 

5/10/20 at 22:00;  Stop 5/11/20 at 21:59;  Status DC


Potassium Chloride 80 meq/ Magnesium Sulfate 20 meq/ Multivitamins 10 

ml/Chromium/ Copper/Manganese/ Seleni/Zn 0.5 ml/ Insulin Human Regular 15 unit/ 

Total Parenteral Nutrition/Amino Acids/Dextrose/ Fat Emulsion Intravenous 1,920 

ml @  80 mls/hr TPN  CONT IV  Last administered on 5/11/20at 22:20;  Start 

5/11/20 at 22:00;  Stop 5/12/20 at 21:59;  Status DC


Lidocaine HCl (Buffered Lidocaine 1%) 3 ml STK-MED ONCE .ROUTE ;  Start 5/12/20 

at 09:54;  Stop 5/12/20 at 09:55;  Status DC


Hydromorphone HCl (Dilaudid Standard PCA) 12 mg STK-MED ONCE IV ;  Start 5/1/20 

at 15:50;  Stop 5/12/20 at 11:24;  Status DC


Potassium Chloride 80 meq/ Magnesium Sulfate 20 meq/ Multivitamins 10 

ml/Chromium/ Copper/Manganese/ Seleni/Zn 0.5 ml/ Insulin Human Regular 15 unit/ 

Total Parenteral Nutrition/Amino Acids/Dextrose/ Fat Emulsion Intravenous 1,920 

ml @  80 mls/hr TPN  CONT IV  Last administered on 5/12/20at 21:40;  Start 

5/12/20 at 22:00;  Stop 5/13/20 at 21:59;  Status DC


Lidocaine HCl (Buffered Lidocaine 1%) 6 ml 1X  ONCE INJ  Last administered on 

5/12/20at 14:15;  Start 5/12/20 at 14:15;  Stop 5/12/20 at 14:16;  Status DC


Potassium Chloride 80 meq/ Magnesium Sulfate 20 meq/ Multivitamins 10 

ml/Chromium/ Copper/Manganese/ Seleni/Zn 1 ml/ Insulin Human Regular 15 unit/ 

Total Parenteral Nutrition/Amino Acids/Dextrose/ Fat Emulsion Intravenous 1,920 

ml @  80 mls/hr TPN  CONT IV  Last administered on 5/13/20at 22:04;  Start 

5/13/20 at 22:00;  Stop 5/14/20 at 21:59;  Status DC


Potassium Chloride/Water 100 ml @  100 mls/hr 1X  ONCE IV  Last administered on 

5/14/20at 11:34;  Start 5/14/20 at 11:00;  Stop 5/14/20 at 11:59;  Status DC


Potassium Chloride 90 meq/ Magnesium Sulfate 20 meq/ Multivitamins 10 

ml/Chromium/ Copper/Manganese/ Seleni/Zn 1 ml/ Insulin Human Regular 15 unit/ 

Total Parenteral Nutrition/Amino Acids/Dextrose/ Fat Emulsion Intravenous 1,920 

ml @  80 mls/hr TPN  CONT IV  Last administered on 5/14/20at 22:57;  Start 

5/14/20 at 22:00;  Stop 5/15/20 at 21:59;  Status DC


Potassium Chloride 90 meq/ Magnesium Sulfate 20 meq/ Multivitamins 10 ml

/Chromium/ Copper/Manganese/ Seleni/Zn 1 ml/ Insulin Human Regular 15 unit/ 

Total Parenteral Nutrition/Amino Acids/Dextrose/ Fat Emulsion Intravenous 1,920 

ml @  80 mls/hr TPN  CONT IV  Last administered on 5/15/20at 22:48;  Start 

5/15/20 at 22:00;  Stop 5/16/20 at 21:59;  Status DC


Potassium Chloride 90 meq/ Magnesium Sulfate 20 meq/ Multivitamins 10 

ml/Chromium/ Copper/Manganese/ Seleni/Zn 1 ml/ Insulin Human Regular 15 unit/ 

Total Parenteral Nutrition/Amino Acids/Dextrose/ Fat Emulsion Intravenous 1,890 

ml @  78.75 mls/ hr TPN  CONT IV  Last administered on 5/16/20at 22:15;  Start 

5/16/20 at 22:00;  Stop 5/17/20 at 21:59;  Status DC


Linezolid/Dextrose 300 ml @  300 mls/hr Q12HR IV  Last administered on 5/19/20at

21:08;  Start 5/17/20 at 09:00;  Stop 5/20/20 at 08:11;  Status DC


Daptomycin 450 mg/ Sodium Chloride 50 ml @  100 mls/hr Q24H IV  Last 

administered on 5/20/20at 09:25;  Start 5/17/20 at 09:00;  Stop 5/21/20 at 

08:30;  Status DC


Potassium Chloride 90 meq/ Magnesium Sulfate 20 meq/ Multivitamins 10 

ml/Chromium/ Copper/Manganese/ Seleni/Zn 1 ml/ Insulin Human Regular 15 unit/ 

Total Parenteral Nutrition/Amino Acids/Dextrose/ Fat Emulsion Intravenous 1,890 

ml @  78.75 mls/ hr TPN  CONT IV  Last administered on 5/17/20at 21:34;  Start 

5/17/20 at 22:00;  Stop 5/18/20 at 21:59;  Status DC


Lorazepam (Ativan Inj) 2 mg STK-MED ONCE .ROUTE ;  Start 5/17/20 at 14:58;  Stop

5/17/20 at 14:58;  Status DC


Metoprolol Tartrate (Lopressor Vial) 5 mg 1X  ONCE IVP  Last administered on 

5/17/20at 15:31;  Start 5/17/20 at 15:15;  Stop 5/17/20 at 15:16;  Status DC


Lorazepam (Ativan Inj) 2 mg 1X  ONCE IVP  Last administered on 5/17/20at 15:30; 

Start 5/17/20 at 15:15;  Stop 5/17/20 at 15:16;  Status DC


Enoxaparin Sodium (Lovenox 40mg Syringe) 40 mg Q24H SQ  Last administered on 

6/5/20at 17:44;  Start 5/17/20 at 17:00;  Stop 6/7/20 at 06:50;  Status DC


Lorazepam (Ativan Inj) 1 mg PRN Q4HRS  PRN IVP ANXIETY / AGITATION MILD-MOD Last

administered on 5/31/20at 15:55;  Start 5/17/20 at 19:15;  Stop 6/2/20 at 11:45;

 Status DC


Lorazepam (Ativan Inj) 2 mg PRN Q4HRS  PRN IVP ANXIETY / AGITATION SEVERE Last 

administered on 6/1/20at 07:55;  Start 5/17/20 at 19:15;  Stop 6/2/20 at 11:45; 

Status DC


Fentanyl Citrate (Fentanyl 2ml Vial) 50 mcg PRN Q4HRS  PRN IVP SEVERE PAIN Last 

administered on 6/13/20at 05:15;  Start 5/18/20 at 13:15;  Stop 6/14/20 at 

09:29;  Status DC


Fentanyl Citrate (Fentanyl 2ml Vial) 25 mcg PRN Q4HRS  PRN IVP MODERATE PAIN 

Last administered on 6/13/20at 00:27;  Start 5/18/20 at 13:15;  Stop 6/14/20 at 

09:30;  Status DC


Potassium Chloride 90 meq/ Magnesium Sulfate 20 meq/ Multivitamins 10 

ml/Chromium/ Copper/Manganese/ Seleni/Zn 1 ml/ Insulin Human Regular 15 unit/ 

Total Parenteral Nutrition/Amino Acids/Dextrose/ Fat Emulsion Intravenous 1,890 

ml @  78.75 mls/ hr TPN  CONT IV  Last administered on 5/18/20at 22:18;  Start 

5/18/20 at 22:00;  Stop 5/19/20 at 21:59;  Status DC


Furosemide (Lasix) 40 mg 1X  ONCE IVP  Last administered on 5/18/20at 21:51;  

Start 5/18/20 at 21:45;  Stop 5/18/20 at 21:48;  Status DC


Albumin Human 100 ml @  100 mls/hr 1X PRN  PRN IV SEE COMMENTS;  Start 5/19/20 

at 01:30


Furosemide (Lasix) 40 mg [id number] IVP  Last administered on 6/3/20at 08:04;  Start 

5/19/20 at 14:00;  Stop 6/3/20 at 13:07;  Status DC


Potassium Chloride 90 meq/ Magnesium Sulfate 20 meq/ Multivitamins 10 

ml/Chromium/ Copper/Manganese/ Seleni/Zn 1 ml/ Insulin Human Regular 15 unit/ 

Total Parenteral Nutrition/Amino Acids/Dextrose/ Fat Emulsion Intravenous 1,800 

ml @  75 mls/hr TPN  CONT IV  Last administered on 5/19/20at 22:31;  Start 5/1 9/20 at 22:00;  Stop 5/20/20 at 21:59;  Status DC


Potassium Chloride 90 meq/ Magnesium Sulfate 20 meq/ Multivitamins 10 ml/Chrom

ium/ Copper/Manganese/ Seleni/Zn 1 ml/ Insulin Human Regular 15 unit/ Total 

Parenteral Nutrition/Amino Acids/Dextrose/ Fat Emulsion Intravenous 1,800 ml @  

75 mls/hr TPN  CONT IV  Last administered on 5/20/20at 22:28;  Start 5/20/20 at 

22:00;  Stop 5/21/20 at 21:59;  Status DC


Potassium Chloride 110 meq/ Magnesium Sulfate 20 meq/ Multivitamins 10 

ml/Chromium/ Copper/Manganese/ Seleni/Zn 1 ml/ Insulin Human Regular 15 unit/ 

Total Parenteral Nutrition/Amino Acids/Dextrose/ Fat Emulsion Intravenous 1,800 

ml @  75 mls/hr TPN  CONT IV  Last administered on 5/21/20at 22:01;  Start 

5/21/20 at 22:00;  Stop 5/22/20 at 21:59;  Status DC


Saliva Substitute (Biotene Moisturizing Mouth) 2 spray PRN Q15MIN  PRN PO DRY 

MOUTH;  Start 5/21/20 at 11:00


Potassium Chloride 110 meq/ Magnesium Sulfate 20 meq/ Multivitamins 10 

ml/Chromium/ Copper/Manganese/ Seleni/Zn 1 ml/ Insulin Human Regular 15 unit/ 

Total Parenteral Nutrition/Amino Acids/Dextrose/ Fat Emulsion Intravenous 1,800 

ml @  75 mls/hr TPN  CONT IV  Last administered on 5/22/20at 22:21;  Start 

5/22/20 at 22:00;  Stop 5/23/20 at 21:59;  Status DC


Potassium Chloride 110 meq/ Magnesium Sulfate 20 meq/ Multivitamins 10 

ml/Chromium/ Copper/Manganese/ Seleni/Zn 1 ml/ Insulin Human Regular 15 unit/ 

Total Parenteral Nutrition/Amino Acids/Dextrose/ Fat Emulsion Intravenous 1,800 

ml @  75 mls/hr TPN  CONT IV  Last administered on 5/23/20at 22:04;  Start 5/2

3/20 at 22:00;  Stop 5/24/20 at 21:59;  Status DC


Potassium Chloride 110 meq/ Magnesium Sulfate 20 meq/ Multivitamins 10 ml/

mium/ Copper/Manganese/ Seleni/Zn 1 ml/ Insulin Human Regular 15 unit/ Total 

Parenteral Nutrition/Amino Acids/Dextrose/ Fat Emulsion Intravenous 1,800 ml @  

75 mls/hr TPN  CONT IV  Last administered on 5/24/20at 22:48;  Start 5/24/20 at 

22:00;  Stop 5/25/20 at 21:59;  Status DC


Potassium Chloride 70 meq/ Magnesium Sulfate 20 meq/ Multivitamins 10 

ml/Chromium/ Copper/Manganese/ Seleni/Zn 1 ml/ Insulin Human Regular 15 unit/ 

Total Parenteral Nutrition/Amino Acids/Dextrose/ Fat Emulsion Intravenous 1,800 

ml @  75 mls/hr TPN  CONT IV  Last administered on 5/25/20at 21:39;  Start 

5/25/20 at 22:00;  Stop 5/26/20 at 21:59;  Status DC


Meropenem 500 mg/ Sodium Chloride 50 ml @  100 mls/hr Q6HRS IV  Last 

administered on 5/27/20at 06:02;  Start 5/25/20 at 18:00;  Stop 5/27/20 at 

09:59;  Status DC


Barium Sulfate (Varibar Thin Liquid Apple) 148 gm 1X  ONCE PO ;  Start 5/26/20 

at 11:45;  Stop 5/26/20 at 11:49;  Status DC


Potassium Chloride 70 meq/ Magnesium Sulfate 20 meq/ Multivitamins 10 

ml/Chromium/ Copper/Manganese/ Seleni/Zn 1 ml/ Insulin Human Regular 15 unit/ 

Total Parenteral Nutrition/Amino Acids/Dextrose/ Fat Emulsion Intravenous 1,800 

ml @  75 mls/hr TPN  CONT IV  Last administered on 5/26/20at 22:27;  Start 

5/26/20 at 22:00;  Stop 5/27/20 at 21:59;  Status DC


Piperacillin Sod/ Tazobactam Sod 3.375 gm/Sodium Chloride 50 ml @  100 mls/hr 

Q6HRS IV  Last administered on 6/4/20at 06:10;  Start 5/27/20 at 12:00;  Stop 

6/4/20 at 07:26;  Status DC


Potassium Chloride 70 meq/ Magnesium Sulfate 20 meq/ Multivitamins 10 ml/Ch

romium/ Copper/Manganese/ Seleni/Zn 1 ml/ Insulin Human Regular 15 unit/ Total 

Parenteral Nutrition/Amino Acids/Dextrose/ Fat Emulsion Intravenous 1,800 ml @  

75 mls/hr TPN  CONT IV  Last administered on 5/27/20at 22:03;  Start 5/27/20 at 

22:00;  Stop 5/28/20 at 21:59;  Status DC


Potassium Chloride 70 meq/ Magnesium Sulfate 20 meq/ Multivitamins 10 

ml/Chromium/ Copper/Manganese/ Seleni/Zn 1 ml/ Insulin Human Regular 15 unit/ 

Total Parenteral Nutrition/Amino Acids/Dextrose/ Fat Emulsion Intravenous 1,800 

ml @  75 mls/hr TPN  CONT IV  Last administered on 5/28/20at 22:33;  Start 

5/28/20 at 22:00;  Stop 5/29/20 at 21:59;  Status DC


Potassium Chloride 70 meq/ Magnesium Sulfate 20 meq/ Multivitamins 10 

ml/Chromium/ Copper/Manganese/ Seleni/Zn 1 ml/ Insulin Human Regular 15 unit/ 

Total Parenteral Nutrition/Amino Acids/Dextrose/ Fat Emulsion Intravenous 1,800 

ml @  75 mls/hr TPN  CONT IV  Last administered on 5/29/20at 23:13;  Start 

5/29/20 at 22:00;  Stop 5/30/20 at 21:59;  Status DC


Potassium Chloride 80 meq/ Magnesium Sulfate 20 meq/ Multivitamins 10 

ml/Chromium/ Copper/Manganese/ Seleni/Zn 1 ml/ Insulin Human Regular 15 unit/ 

Total Parenteral Nutrition/Amino Acids/Dextrose/ Fat Emulsion Intravenous 1,800 

ml @  75 mls/hr TPN  CONT IV  Last administered on 5/30/20at 22:30;  Start 

5/30/20 at 22:00;  Stop 5/31/20 at 21:59;  Status DC


Potassium Chloride 80 meq/ Magnesium Sulfate 20 meq/ Multivitamins 10 

ml/Chromium/ Copper/Manganese/ Seleni/Zn 1 ml/ Insulin Human Regular 15 unit/ 

Total Parenteral Nutrition/Amino Acids/Dextrose/ Fat Emulsion Intravenous 1,800 

ml @  75 mls/hr TPN  CONT IV  Last administered on 5/31/20at 21:54;  Start 

5/31/20 at 22:00;  Stop 6/1/20 at 21:59;  Status DC


Potassium Chloride/Water 100 ml @  100 mls/hr 1X  ONCE IV  Last administered on 

6/1/20at 10:15;  Start 6/1/20 at 10:00;  Stop 6/1/20 at 10:59;  Status DC


Potassium Chloride 90 meq/ Magnesium Sulfate 20 meq/ Multivitamins 10 

ml/Chromium/ Copper/Manganese/ Seleni/Zn 1 ml/ Insulin Human Regular 20 unit/ 

Total Parenteral Nutrition/Amino Acids/Dextrose/ Fat Emulsion Intravenous 1,800 

ml @  75 mls/hr TPN  CONT IV  Last administered on 6/1/20at 22:28;  Start 6/1/20

at 22:00;  Stop 6/2/20 at 21:59;  Status DC


Potassium Chloride 90 meq/ Magnesium Sulfate 20 meq/ Multivitamins 10 

ml/Chromium/ Copper/Manganese/ Seleni/Zn 1 ml/ Insulin Human Regular 20 unit/ 

Total Parenteral Nutrition/Amino Acids/Dextrose/ Fat Emulsion Intravenous 1,800 

ml @  75 mls/hr TPN  CONT IV  Last administered on 6/2/20at 22:08;  Start 6/2/20

at 22:00;  Stop 6/3/20 at 21:59;  Status DC


Lorazepam (Ativan Inj) 0.25 mg PRN Q4HRS  PRN IVP ANXIETY / AGITATION Last 

administered on 6/13/20at 02:25;  Start 6/3/20 at 07:30


Potassium Chloride 90 meq/ Magnesium Sulfate 20 meq/ Multivitamins 10 

ml/Chromium/ Copper/Manganese/ Seleni/Zn 1 ml/ Insulin Human Regular 20 unit/ 

Total Parenteral Nutrition/Amino Acids/Dextrose/ Fat Emulsion Intravenous 1,800 

ml @  75 mls/hr TPN  CONT IV  Last administered on 6/3/20at 23:13;  Start 6/3/20

at 22:00;  Stop 6/4/20 at 21:59;  Status DC


Furosemide (Lasix) 40 mg DAILY IVP  Last administered on 6/5/20at 11:14;  Start 

6/3/20 at 13:30;  Stop 6/7/20 at 09:12;  Status DC


Fluoxetine HCl (PROzac) 20 mg QHS PEG  Last administered on 6/26/20at 21:29;  

Start 6/4/20 at 21:00


Fentanyl (Duragesic 50mcg/ Hr Patch) 1 patch Q72H TD  Last administered on 

6/4/20at 21:22;  Start 6/4/20 at 21:00;  Stop 6/13/20 at 12:00;  Status DC


Potassium Chloride 40 meq/ Potassium Acetate 60 meq/Magnesium Sulfate 10 meq/ 

Multivitamins 10 ml/Chromium/ Copper/Manganese/ Seleni/Zn 1 ml/ Insulin Human 

Regular 20 unit/ Total Parenteral Nutrition/Amino Acids/Dextrose/ Fat Emulsion 

Intravenous 1,800 ml @  75 mls/hr TPN  CONT IV  Last administered on 6/5/20at 

00:03;  Start 6/4/20 at 22:00;  Stop 6/5/20 at 21:59;  Status DC


Potassium Acetate 80 meq/Magnesium Sulfate 5 meq/ Multivitamins 10 ml/Chromium/ 

Copper/Manganese/ Seleni/Zn 1 ml/ Insulin Human Regular 20 unit/ Total 

Parenteral Nutrition/Amino Acids/Dextrose/ Fat Emulsion Intravenous 1,920 ml @  

80 mls/hr TPN  CONT IV  Last administered on 6/5/20at 21:59;  Start 6/5/20 at 

22:00;  Stop 6/6/20 at 21:59;  Status DC


Potassium Acetate 60 meq/Magnesium Sulfate 5 meq/ Multivitamins 10 ml/Chromium/ 

Copper/Manganese/ Seleni/Zn 1 ml/ Insulin Human Regular 30 unit/ Total 

Parenteral Nutrition/Amino Acids/Dextrose/ Fat Emulsion Intravenous 1,920 ml @  

80 mls/hr TPN  CONT IV  Last administered on 6/6/20at 21:54;  Start 6/6/20 at 

22:00;  Stop 6/7/20 at 21:59;  Status DC


Norepinephrine Bitartrate 8 mg/ Dextrose 258 ml @  13.332 mls/ hr CONT  PRN IV 

PER PROTOCOL Last administered on 6/7/20at 21:46;  Start 6/7/20 at 06:30


Albumin Human 500 ml @  125 mls/hr 1X  ONCE IV  Last administered on 6/7/20at 

08:10;  Start 6/7/20 at 08:15;  Stop 6/7/20 at 12:14;  Status DC


Potassium Acetate 40 meq/Magnesium Sulfate 5 meq/ Multivitamins 10 ml/Chromium/ 

Copper/Manganese/ Seleni/Zn 1 ml/ Insulin Human Regular 30 unit/ Total 

Parenteral Nutrition/Amino Acids/Dextrose/ Fat Emulsion Intravenous 1,920 ml @  

80 mls/hr TPN  CONT IV  Last administered on 6/7/20at 22:23;  Start 6/7/20 at 

22:00;  Stop 6/8/20 at 21:59;  Status DC


Meropenem 1 gm/ Sodium Chloride 100 ml @  200 mls/hr Q8HRS IV ;  Start 6/7/20 at

14:00;  Status Cancel


Meropenem 1 gm/ Sodium Chloride 100 ml @  200 mls/hr Q8HRS IV  Last administered

on 6/7/20at 11:04;  Start 6/7/20 at 10:00;  Stop 6/7/20 at 13:00;  Status DC


Meropenem 1 gm/ Sodium Chloride 100 ml @  200 mls/hr Q12HR IV  Last administered

on 6/25/20at 08:27;  Start 6/7/20 at 21:00;  Stop 6/25/20 at 08:56;  Status DC


Sodium Chloride 1,000 ml @  1,000 mls/hr 1X  ONCE IV  Last administered on 

6/7/20at 11:06;  Start 6/7/20 at 10:45;  Stop 6/7/20 at 11:44;  Status DC


Micafungin Sodium 100 mg/Dextrose 100 ml @  100 mls/hr Q24H IV  Last 

administered on 6/24/20at 12:34;  Start 6/7/20 at 11:00;  Stop 6/25/20 at 08:56;

 Status DC


Daptomycin 410 mg/ Sodium Chloride 50 ml @  100 mls/hr Q24H IV  Last 

administered on 6/9/20at 13:33;  Start 6/7/20 at 14:00;  Stop 6/10/20 at 08:30; 

Status DC


Midazolam HCl (Versed) 2 mg STK-MED ONCE .ROUTE ;  Start 6/7/20 at 14:47;  Stop 

6/7/20 at 14:48;  Status DC


Fentanyl Citrate (Fentanyl 2ml Vial) 100 mcg STK-MED ONCE .ROUTE ;  Start 6/7/20

at 14:47;  Stop 6/7/20 at 14:48;  Status DC


Flumazenil (Romazicon) 0.5 mg STK-MED ONCE IV ;  Start 6/7/20 at 14:48;  Stop 

6/7/20 at 14:48;  Status DC


Naloxone HCl (Narcan) 0.4 mg STK-MED ONCE .ROUTE ;  Start 6/7/20 at 14:48;  Stop

6/7/20 at 14:48;  Status DC


Lidocaine HCl (Lidocaine 1% 20ml Vial) 20 ml STK-MED ONCE .ROUTE ;  Start 6/7/20

at 14:48;  Stop 6/7/20 at 14:48;  Status DC


Midazolam HCl (Versed) 2 mg 1X  ONCE IV  Last administered on 6/7/20at 15:28;  

Start 6/7/20 at 15:00;  Stop 6/7/20 at 15:01;  Status DC


Fentanyl Citrate (Fentanyl 2ml Vial) 100 mcg 1X  ONCE IV  Last administered on 

6/7/20at 15:28;  Start 6/7/20 at 15:00;  Stop 6/7/20 at 15:01;  Status DC


Lidocaine HCl (Lidocaine 1% 20ml Vial) 20 ml 1X  ONCE INJ  Last administered on 

6/7/20at 15:30;  Start 6/7/20 at 15:00;  Stop 6/7/20 at 15:01;  Status DC


Sodium Chloride 1,000 ml @  100 mls/hr Q10H IV  Last administered on 6/16/20at 

07:30;  Start 6/7/20 at 20:00;  Stop 6/16/20 at 11:26;  Status DC


Sodium Bicarbonate (Sodium Bicarb Adult 8.4% Syr) 50 meq 1X  ONCE IV  Last 

administered on 6/7/20at 21:47;  Start 6/7/20 at 22:00;  Stop 6/7/20 at 22:01;  

Status DC


Potassium Acetate 40 meq/Magnesium Sulfate 5 meq/ Multivitamins 10 ml/Chromium/ 

Copper/Manganese/ Seleni/Zn 1 ml/ Insulin Human Regular 30 unit/ Total 

Parenteral Nutrition/Amino Acids/Dextrose/ Fat Emulsion Intravenous 1,920 ml @  

80 mls/hr TPN  CONT IV  Last administered on 6/8/20at 22:28;  Start 6/8/20 at 22

:00;  Stop 6/9/20 at 21:59;  Status DC


Sodium Chloride 500 ml @  500 mls/hr 1X  ONCE IV  Last administered on 6/9/20at 

06:39;  Start 6/9/20 at 06:45;  Stop 6/9/20 at 07:44;  Status DC


Potassium Acetate 40 meq/Magnesium Sulfate 5 meq/ Multivitamins 10 ml/Chromium/ 

Copper/Manganese/ Seleni/Zn 1 ml/ Insulin Human Regular 30 unit/ Total 

Parenteral Nutrition/Amino Acids/Dextrose/ Fat Emulsion Intravenous 1,920 ml @  

80 mls/hr TPN  CONT IV  Last administered on 6/9/20at 22:03;  Start 6/9/20 at 

22:00;  Stop 6/10/20 at 21:59;  Status DC


Metoprolol Tartrate (Lopressor Vial) 5 mg PRN Q6HRS  PRN IVP HYPERTENSION Last 

administered on 6/18/20at 10:14;  Start 6/10/20 at 09:00


Potassium Acetate 40 meq/Magnesium Sulfate 5 meq/ Multivitamins 10 ml/Chromium/ 

Copper/Manganese/ Seleni/Zn 1 ml/ Insulin Human Regular 30 unit/ Total 

Parenteral Nutrition/Amino Acids/Dextrose/ Fat Emulsion Intravenous 1,920 ml @  

80 mls/hr TPN  CONT IV  Last administered on 6/10/20at 21:26;  Start 6/10/20 at 

22:00;  Stop 6/11/20 at 21:59;  Status DC


Potassium Acetate 40 meq/Magnesium Sulfate 5 meq/ Multivitamins 10 ml/Chromium/ 

Copper/Manganese/ Seleni/Zn 1 ml/ Insulin Human Regular 30 unit/ Total 

Parenteral Nutrition/Amino Acids/Dextrose/ Fat Emulsion Intravenous 1,920 ml @  

80 mls/hr TPN  CONT IV  Last administered on 6/11/20at 23:23;  Start 6/11/20 at 

22:00;  Stop 6/12/20 at 21:59;  Status DC


Potassium Acetate 40 meq/Magnesium Sulfate 5 meq/ Multivitamins 10 ml/Chromium/ 

Copper/Manganese/ Seleni/Zn 1 ml/ Insulin Human Regular 30 unit/ Total Parenter

al Nutrition/Amino Acids/Dextrose/ Fat Emulsion Intravenous 1,920 ml @  80 

mls/hr TPN  CONT IV  Last administered on 6/12/20at 21:35;  Start 6/12/20 at 

22:00;  Stop 6/13/20 at 21:59;  Status DC


Furosemide (Lasix) 20 mg 1X  ONCE IVP  Last administered on 6/13/20at 06:26;  

Start 6/13/20 at 06:15;  Stop 6/13/20 at 06:16;  Status DC


Methylprednisolone Sodium Succinate (SOLU-Medrol 125MG VIAL) 125 mg 1X  ONCE IV 

Last administered on 6/13/20at 06:26;  Start 6/13/20 at 06:15;  Stop 6/13/20 at 

06:16;  Status DC


Albuterol/ Ipratropium (Duoneb) 3 ml Q4HRS NEB  Last administered on 6/27/20at 

08:50;  Start 6/13/20 at 08:00


Fentanyl Citrate 30 ml @ 0 mls/hr CONT  PRN IV SEE PROTOCOL Last administered on

6/26/20at 13:54;  Start 6/13/20 at 06:00


Propofol 100 ml @ 0 mls/hr CONT  PRN IV SEE PROTOCOL Last administered on 

6/20/20at 23:50;  Start 6/13/20 at 06:00


Fentanyl Citrate (Fentanyl 2ml Vial) 25 mcg PRN Q1HR  PRN IV SEE COMMENTS;  

Start 6/13/20 at 06:00


Fentanyl Citrate (Fentanyl 2ml Vial) 50 mcg PRN Q1HR  PRN IV SEE COMMENTS;  

Start 6/13/20 at 06:00


Chlorhexidine Gluconate (Peridex) 15 ml BID MM ;  Start 6/13/20 at 09:00;  Stop 

6/13/20 at 07:58;  Status DC


Potassium Acetate 40 meq/Magnesium Sulfate 5 meq/ Multivitamins 10 ml/Chromium/ 

Copper/Manganese/ Seleni/Zn 1 ml/ Insulin Human Regular 30 unit/ Total 

Parenteral Nutrition/Amino Acids/Dextrose/ Fat Emulsion Intravenous 1,920 ml @  

80 mls/hr TPN  CONT IV  Last administered on 6/13/20at 21:19;  Start 6/13/20 at 

22:00;  Stop 6/14/20 at 21:59;  Status DC


Acetylcysteine (Mucomyst 20% Resp Treatment) 600 mg BID NEB  Last administered 

on 6/19/20at 09:33;  Start 6/13/20 at 21:00;  Stop 6/19/20 at 10:39;  Status DC


Magnesium Sulfate 100 ml @  25 mls/hr 1X  ONCE IV  Last administered on 

6/13/20at 15:48;  Start 6/13/20 at 15:45;  Stop 6/13/20 at 19:44;  Status DC


Potassium Acetate 40 meq/Magnesium Sulfate 5 meq/ Multivitamins 10 ml/Chromium/ 

Copper/Manganese/ Seleni/Zn 1 ml/ Insulin Human Regular 30 unit/ Total 

Parenteral Nutrition/Amino Acids/Dextrose/ Fat Emulsion Intravenous 1,920 ml @  

80 mls/hr TPN  CONT IV  Last administered on 6/14/20at 21:35;  Start 6/14/20 at 

22:00;  Stop 6/15/20 at 21:59;  Status DC


Potassium Chloride/Water 100 ml @  100 mls/hr Q1H IV  Last administered on 

6/15/20at 08:31;  Start 6/15/20 at 07:00;  Stop 6/15/20 at 08:59;  Status DC


Potassium Acetate 40 meq/Magnesium Sulfate 5 meq/ Multivitamins 10 ml/Chromium/ 

Copper/Manganese/ Seleni/Zn 1 ml/ Insulin Human Regular 30 unit/ Total 

Parenteral Nutrition/Amino Acids/Dextrose/ Fat Emulsion Intravenous 1,920 ml @  

80 mls/hr TPN  CONT IV  Last administered on 6/15/20at 21:54;  Start 6/15/20 at 

22:00;  Stop 6/16/20 at 19:34;  Status DC


Lidocaine HCl (Buffered Lidocaine 1%) 3 ml STK-MED ONCE .ROUTE ;  Start 6/15/20 

at 12:14;  Stop 6/15/20 at 12:14;  Status DC


Lidocaine HCl (Buffered Lidocaine 1%) 3 ml 1X  ONCE IJ  Last administered on 

6/15/20at 13:11;  Start 6/15/20 at 13:00;  Stop 6/15/20 at 13:01;  Status DC


Magnesium Sulfate 50 ml @ 25 mls/hr 1X  ONCE IV ;  Start 6/16/20 at 08:15;  Stop

6/16/20 at 10:14;  Status DC


Potassium Acetate 40 meq/Magnesium Sulfate 10 meq/ Multivitamins 10 ml/Chromium/

Copper/Manganese/ Seleni/Zn 1 ml/ Insulin Human Regular 20 unit/ Total 

Parenteral Nutrition/Amino Acids/Dextrose/ Fat Emulsion Intravenous 1,920 ml @  

80 mls/hr TPN  CONT IV  Last administered on 6/16/20at 21:32;  Start 6/16/20 at 

22:00;  Stop 6/17/20 at 21:59;  Status DC


Potassium Chloride/Water 100 ml @  100 mls/hr Q1H IV  Last administered on 

6/17/20at 09:12;  Start 6/17/20 at 08:00;  Stop 6/17/20 at 09:59;  Status DC


Alteplase, Recombinant (Cathflo For Central Catheter Clearance) 4 mg 1X  ONCE 

INT CAT ;  Start 6/17/20 at 09:15;  Stop 6/17/20 at 09:16;  Status UNV


Alteplase, Recombinant (Cathflo For Central Catheter Clearance) 4 mg 1X  ONCE 

INT CAT ;  Start 6/17/20 at 09:15;  Stop 6/17/20 at 09:16;  Status UNV


Alteplase, Recombinant (Cathflo For Central Catheter Clearance) 4 mg 1X  ONCE 

INT CAT ;  Start 6/17/20 at 09:15;  Stop 6/17/20 at 09:16;  Status UNV


Alteplase, Recombinant 4 mg/ Sodium Chloride 20 ml @ 20 mls/hr 1X  ONCE IV  Last

administered on 6/17/20at 10:10;  Start 6/17/20 at 10:00;  Stop 6/17/20 at 

10:59;  Status DC


Alteplase, Recombinant 4 mg/ Sodium Chloride 20 ml @ 20 mls/hr 1X  ONCE IV  Last

administered on 6/17/20at 10:09;  Start 6/17/20 at 10:00;  Stop 6/17/20 at 

10:59;  Status DC


Alteplase, Recombinant 4 mg/ Sodium Chloride 20 ml @ 20 mls/hr 1X  ONCE IV  Last

administered on 6/17/20at 10:09;  Start 6/17/20 at 10:00;  Stop 6/17/20 at 

10:59;  Status DC


Potassium Acetate 60 meq/Magnesium Sulfate 10 meq/ Multivitamins 10 ml/Chromium/

Copper/Manganese/ Seleni/Zn 1 ml/ Insulin Human Regular 20 unit/ Total 

Parenteral Nutrition/Amino Acids/Dextrose/ Fat Emulsion Intravenous 1,920 ml @  

80 mls/hr TPN  CONT IV  Last administered on 6/17/20at 21:55;  Start 6/17/20 at 

22:00;  Stop 6/18/20 at 21:59;  Status DC


Albumin Human 500 ml @  125 mls/hr 1X  ONCE IV  Last administered on 6/18/20at 

12:01;  Start 6/18/20 at 11:15;  Stop 6/18/20 at 15:14;  Status DC


Sodium Chloride 500 ml @  500 mls/hr 1X  ONCE IV  Last administered on 6/18/20at

13:50;  Start 6/18/20 at 11:15;  Stop 6/18/20 at 12:14;  Status DC


Potassium Acetate 60 meq/Magnesium Sulfate 14 meq/ Multivitamins 10 ml/Chromium/

Copper/Manganese/ Seleni/Zn 1 ml/ Insulin Human Regular 20 unit/ Total 

Parenteral Nutrition/Amino Acids/Dextrose/ Fat Emulsion Intravenous 1,920 ml @  

80 mls/hr TPN  CONT IV  Last administered on 6/18/20at 22:26;  Start 6/18/20 at 

22:00;  Stop 6/19/20 at 21:59;  Status DC


Ciprofloxacin/ Dextrose 200 ml @  200 mls/hr Q12HR IV  Last administered on 

6/25/20at 08:27;  Start 6/18/20 at 21:00;  Stop 6/25/20 at 08:56;  Status DC


Albumin Human 250 ml @  62.5 mls/hr 1X  ONCE IV  Last administered on 6/19/20at 

11:09;  Start 6/19/20 at 11:00;  Stop 6/19/20 at 14:59;  Status DC


Furosemide (Lasix) 20 mg 1X  ONCE IVP  Last administered on 6/19/20at 14:52;  

Start 6/19/20 at 10:45;  Stop 6/19/20 at 10:49;  Status DC


Potassium Acetate 60 meq/Magnesium Sulfate 14 meq/ Multivitamins 10 ml/Chromium/

Copper/Manganese/ Seleni/Zn 1 ml/ Insulin Human Regular 15 unit/ Total Parent

eral Nutrition/Amino Acids/Dextrose/ Fat Emulsion Intravenous 1,920 ml @  80 

mls/hr TPN  CONT IV  Last administered on 6/19/20at 22:08;  Start 6/19/20 at 

22:00;  Stop 6/20/20 at 21:59;  Status DC


Potassium Acetate 60 meq/Magnesium Sulfate 14 meq/ Multivitamins 10 ml/Chromium/

Copper/Manganese/ Seleni/Zn 1 ml/ Insulin Human Regular 15 unit/ Total 

Parenteral Nutrition/Amino Acids/Dextrose/ Fat Emulsion Intravenous 1,920 ml @  

80 mls/hr TPN  CONT IV  Last administered on 6/20/20at 22:12;  Start 6/20/20 at 

22:00;  Stop 6/21/20 at 21:59;  Status DC


Potassium Acetate 60 meq/Magnesium Sulfate 14 meq/ Multivitamins 10 ml/Chromium/

Copper/Manganese/ Seleni/Zn 1 ml/ Insulin Human Regular 15 unit/ Total 

Parenteral Nutrition/Amino Acids/Dextrose/ Fat Emulsion Intravenous 1,920 ml @  

80 mls/hr TPN  CONT IV  Last administered on 6/21/20at 22:22;  Start 6/21/20 at 

22:00;  Stop 6/22/20 at 21:59;  Status DC


Furosemide (Lasix) 20 mg 1X  ONCE IVP  Last administered on 6/22/20at 11:07;  

Start 6/22/20 at 10:30;  Stop 6/22/20 at 10:34;  Status DC


Potassium Acetate 60 meq/Magnesium Sulfate 14 meq/ Multivitamins 10 ml/Chromium/

Copper/Manganese/ Seleni/Zn 1 ml/ Insulin Human Regular 15 unit/ Sodium Chloride

20 meq/Total Parenteral Nutrition/Amino Acids/Dextrose/ Fat Emulsion Intravenous

1,920 ml @  80 mls/hr TPN  CONT IV  Last administered on 6/22/20at 21:54;  Start

6/22/20 at 22:00;  Stop 6/23/20 at 21:59;  Status DC


Potassium Acetate 30 meq/Magnesium Sulfate 14 meq/ Multivitamins 10 ml/Chromium/

Copper/Manganese/ Seleni/Zn 1 ml/ Insulin Human Regular 15 unit/ Sodium Chloride

20 meq/Potassium Chloride 30 meq/ Total Parenteral Nutrition/Amino 

Acids/Dextrose/ Fat Emulsion Intravenous 1,920 ml @  80 mls/hr TPN  CONT IV  

Last administered on 6/23/20at 21:46;  Start 6/23/20 at 22:00;  Stop 6/24/20 at 

21:59;  Status DC


Sodium Chloride 80 meq/Potassium Chloride 30 meq/ Potassium Acetate 30 

meq/Magnesium Sulfate 14 meq/ Multivitamins 10 ml/Chromium/ Copper/Manganese/ 

Seleni/Zn 1 ml/ Insulin Human Regular 15 unit/ Total Parenteral Nutrition/Amino 

Acids/Dextrose/ Fat Emulsion Intravenous 1,920 ml @  80 mls/hr TPN  CONT IV  

Last administered on 6/24/20at 22:33;  Start 6/24/20 at 22:00;  Stop 6/25/20 at 

21:59;  Status DC


Furosemide (Lasix) 40 mg 1X  ONCE IVP  Last administered on 6/24/20at 16:27;  

Start 6/24/20 at 15:30;  Stop 6/24/20 at 15:33;  Status DC


Albumin Human 250 ml @  62.5 mls/hr 1X  ONCE IV  Last administered on 6/24/20at 

16:27;  Start 6/24/20 at 15:30;  Stop 6/24/20 at 19:29;  Status DC


Sodium Chloride 80 meq/Potassium Chloride 30 meq/ Potassium Acetate 30 

meq/Magnesium Sulfate 14 meq/ Multivitamins 10 ml/Chromium/ Copper/Manganese/ 

Seleni/Zn 1 ml/ Insulin Human Regular 15 unit/ Total Parenteral Nutrition/Amino 

Acids/Dextrose/ Fat Emulsion Intravenous 1,920 ml @  80 mls/hr TPN  CONT IV  

Last administered on 6/25/20at 22:25;  Start 6/25/20 at 22:00;  Stop 6/26/20 at 

21:59;  Status DC


Sodium Chloride 80 meq/Potassium Chloride 30 meq/ Potassium Acetate 30 

meq/Magnesium Sulfate 14 meq/ Multivitamins 10 ml/Chromium/ Copper/Manganese/ 

Seleni/Zn 1 ml/ Insulin Human Regular 15 unit/ Total Parenteral Nutrition/Amino 

Acids/Dextrose/ Fat Emulsion Intravenous 1,920 ml @  80 mls/hr TPN  CONT IV  

Last administered on 6/26/20at 21:32;  Start 6/26/20 at 22:00;  Stop 6/27/20 at 

21:59


Sodium Chloride 80 meq/Potassium Chloride 30 meq/ Potassium Acetate 30 

meq/Magnesium Sulfate 14 meq/ Multivitamins 10 ml/Chromium/ Copper/Manganese/ S

haley/Zn 1 ml/ Insulin Human Regular 15 unit/ Total Parenteral Nutrition/Amino 

Acids/Dextrose/ Fat Emulsion Intravenous 1,920 ml @  80 mls/hr TPN  CONT IV ;  

Start 6/27/20 at 22:00;  Stop 6/28/20 at 21:59





Active Scripts


Active


Reported


Bisoprolol Fumarate 5 Mg Tablet 10 Mg PO DAILY


Vitals/I & O





Vital Sign - Last 24 Hours








 6/26/20 6/26/20 6/26/20 6/26/20





 12:00 12:48 13:54 14:24


 


Temp 98.4   





 98.4   


 


Pulse 124   


 


Resp 28   


 


B/P (MAP) 106/70 (82)   


 


Pulse Ox 100 92 92 92


 


O2 Delivery Tracheal Collar Tracheal Collar  


 


O2 Flow Rate  8.0 8.0 8.0


 


    





    





 6/26/20 6/26/20 6/26/20 6/26/20





 15:00 16:51 17:00 18:30


 


Pulse 120  120 


 


Resp 28 28 22


 


B/P (MAP) 114/64 (81)  121/73 (89) 


 


Pulse Ox 100 92 100 


 


O2 Delivery Tracheal Collar Tracheal Collar Tracheal Collar 


 


O2 Flow Rate  8.0  





 6/26/20 6/26/20 6/26/20 6/26/20





 19:54 20:00 20:00 23:47


 


Temp   99.6 





   99.6 


 


Pulse   122 


 


Resp   22 


 


B/P (MAP)   115/71 (86) 


 


Pulse Ox 100  99 100


 


O2 Delivery Tracheal Collar Trach Collar Tracheal Collar Tracheal Collar


 


O2 Flow Rate 8.0   8.0


 


    





    





 6/27/20 6/27/20 6/27/20 6/27/20





 00:00 04:00 04:42 08:00


 


Temp 99.5 98.4  





 99.5 98.4  


 


Pulse 110 110  


 


Resp 16 24  


 


B/P (MAP) 107/71 (83) 106/65 (79)  


 


Pulse Ox 100 98 100 


 


O2 Delivery Tracheal Collar Tracheal Collar Tracheal Collar Trach Collar


 


O2 Flow Rate   8.0 


 


    





    





 6/27/20 6/27/20  





 08:00 08:58  


 


Temp 98.6   





 98.6   


 


Pulse 116   


 


Resp 26   


 


B/P (MAP) 122/67 (85)   


 


Pulse Ox 99 100  


 


O2 Delivery Tracheal Collar Tracheal Collar  


 


O2 Flow Rate  8.0  














Intake and Output   


 


 6/26/20 6/26/20 6/27/20





 15:00 23:00 07:00


 


Intake Total  19.5 ml 


 


Output Total 650 ml 185 ml 870 ml


 


Balance -650 ml -165.5 ml -870 ml











Hemodynamically unstable?:  No


Is patient in severe pain?:  No


Is NPO status required?:  No











HIRAM BARRERA MD             Jun 27, 2020 10:31

## 2020-06-27 NOTE — PDOC
PULMONARY PROGRESS NOTES


Subjective


Sleeping laying in bed, remains on trach shield, chest tube draining yellow 

fluid, nursing reports clot in chest tube this am 80cc/ 24 hours 


Patient intubated on 3/23 , s/p trach 4/6,


Vitals





Vital Signs








  Date Time  Temp Pulse Resp B/P (MAP) Pulse Ox O2 Delivery O2 Flow Rate FiO2


 


6/27/20 08:58     100 Tracheal Collar 8.0 


 


6/27/20 08:00 98.6 116 26 122/67 (85)    





 98.6       








ROS:  No Nausea, No Chest Pain, No Abdominal Pain


General:  Alert, No acute distress


HEENT:  Other (nc at perrl   nose clear    neck  trach site ok   no lab  no 

thyromegaly)


Lungs:  Other (diminshed in bases, Rhonci in LLL)


Cardiovascular:  S1, S2


Abdomen:  Soft, Non-tender, Other (bleeding from Sx. site RLQ and firmness)


Extremities:  Other (+1 BLE edema)


Skin:  Warm, Dry


Labs





Laboratory Tests








Test


 6/25/20


12:50 6/25/20


17:39 6/26/20


00:23 6/26/20


05:12


 


Glucose (Fingerstick)


 150 mg/dL


(70-99) 139 mg/dL


(70-99) 138 mg/dL


(70-99) 125 mg/dL


(70-99)


 


Test


 6/26/20


05:15 6/26/20


13:10 6/26/20


17:54 6/27/20


00:20


 


White Blood Count


 10.9 x10^3/uL


(4.0-11.0) 


 


 





 


Red Blood Count


 3.01 x10^6/uL


(3.50-5.40) 


 


 





 


Hemoglobin


 8.6 g/dL


(12.0-15.5) 


 


 





 


Hematocrit


 25.5 %


(36.0-47.0) 


 


 





 


Mean Corpuscular Volume 85 fL ()    


 


Mean Corpuscular Hemoglobin 29 pg (25-35)    


 


Mean Corpuscular Hemoglobin


Concent 34 g/dL


(31-37) 


 


 





 


Red Cell Distribution Width


 18.1 %


(11.5-14.5) 


 


 





 


Platelet Count


 380 x10^3/uL


(140-400) 


 


 





 


Neutrophils (%) (Auto) 78 % (31-73)    


 


Lymphocytes (%) (Auto) 12 % (24-48)    


 


Monocytes (%) (Auto) 7 % (0-9)    


 


Eosinophils (%) (Auto) 2 % (0-3)    


 


Basophils (%) (Auto) 0 % (0-3)    


 


Neutrophils # (Auto)


 8.5 x10^3/uL


(1.8-7.7) 


 


 





 


Lymphocytes # (Auto)


 1.3 x10^3/uL


(1.0-4.8) 


 


 





 


Monocytes # (Auto)


 0.8 x10^3/uL


(0.0-1.1) 


 


 





 


Eosinophils # (Auto)


 0.2 x10^3/uL


(0.0-0.7) 


 


 





 


Basophils # (Auto)


 0.0 x10^3/uL


(0.0-0.2) 


 


 





 


Sodium Level


 137 mmol/L


(136-145) 


 


 





 


Potassium Level


 4.1 mmol/L


(3.5-5.1) 


 


 





 


Chloride Level


 101 mmol/L


() 


 


 





 


Carbon Dioxide Level


 36 mmol/L


(21-32) 


 


 





 


Anion Gap 0 (6-14)    


 


Blood Urea Nitrogen


 15 mg/dL


(7-20) 


 


 





 


Creatinine


 0.6 mg/dL


(0.6-1.0) 


 


 





 


Estimated GFR


(Cockcroft-Gault) 106.3 


 


 


 





 


BUN/Creatinine Ratio 25 (6-20)    


 


Glucose Level


 126 mg/dL


(70-99) 


 


 





 


Calcium Level


 10.4 mg/dL


(8.5-10.1) 


 


 





 


Phosphorus Level


 4.4 mg/dL


(2.6-4.7) 


 


 





 


Magnesium Level


 2.1 mg/dL


(1.8-2.4) 


 


 





 


Total Bilirubin


 0.5 mg/dL


(0.2-1.0) 


 


 





 


Aspartate Amino Transf


(AST/SGOT) 19 U/L (15-37) 


 


 


 





 


Alanine Aminotransferase


(ALT/SGPT) 14 U/L (14-59) 


 


 


 





 


Alkaline Phosphatase


 161 U/L


() 


 


 





 


Total Protein


 4.6 g/dL


(6.4-8.2) 


 


 





 


Albumin


 1.1 g/dL


(3.4-5.0) 


 


 





 


Albumin/Globulin Ratio 0.3 (1.0-1.7)    


 


Glucose (Fingerstick)


 


 134 mg/dL


(70-99) 117 mg/dL


(70-99) 143 mg/dL


(70-99)


 


Test


 6/27/20


06:07 


 


 





 


Glucose (Fingerstick)


 134 mg/dL


(70-99) 


 


 











Laboratory Tests








Test


 6/26/20


13:10 6/26/20


17:54 6/27/20


00:20 6/27/20


06:07


 


Glucose (Fingerstick)


 134 mg/dL


(70-99) 117 mg/dL


(70-99) 143 mg/dL


(70-99) 134 mg/dL


(70-99)








Medications





Active Scripts








 Medications  Dose


 Route/Sig


 Max Daily Dose Days Date Category


 


 Bisoprolol


 Fumarate 5 Mg


 Tablet  10 Mg


 PO DAILY


   3/16/20 Reported








Comments


CXR 6/22


poor insp effort/ increase effusion


 











ct abdomen /pelvis 6/6


1. Removal of the percutaneous pigtail drainage catheters since the prior 


exam. Sequela of pancreatitis with extensive pseudocysts again 


demonstrated, the right-sided collections are slightly larger since the 


prior exam, the left-sided collections are stable. See above.


2. Moderate to large left pleural effusion with atelectasis and collapse 


of most of the left lower lobe, stable. Small right pleural effusion is 


stable.


3. Gallstone.





ct chest 6/15 reviewed








 GRAM NEG COCCOBACILLI:MANY


        SQUAMOUS EPI CELL:RARE


        PMN (WBCs):[id number]


        Unless otherwise specified, Testing Performed by:


        [address] [id number]


        For Inquires, the Physician may contact the Microbiology


        department at [phone]





  RESPIRATORY CULTURE  Final  


        Final





        MANY GRAM NEGATIVE RODS on 06/15/20 at 1107


        FINAL ID= [PSEUDOMONAS AERUGINOSA]


        MICRO CHARGES


        PSEUDOMONAS AERUGINOSA





  ANTIMICROBIAL SUSCEPTIBILITY  Final  


        Comment





        NEG [facility]SON 56


        PSEUDOMONAS AERUGINOSA


        ANTIBIOTIC                        RESULT          INTERPRETATION


        AMIKACIN                          <=16                  S


        AZTREONAM                         <=4                   S


        CEFTAZIDIME                       <=1                   S


        CIPROFLOXACIN                     <=0.25                S


        CEFEPIME                          <=2                   S


        CEFTAZIDIME/AVIBACTAM             <=4                   S


        GENTAMICIN                        <=2                   S


        LEVOFLOXACIN                      <=0.5                 S





Impression


.


IMPRESSION:


1.  Acute hypoxemic respiratory failure secondary to ARDS status post trach, 

developed anemia 6/7, blood drainage from RLQ abdomen drain site, and 

surrounding firmness  / developed septic shock 6/7 from abdomen source, required

levo 6/7


s/p 3 new drains 6/7 with brown color drainage,  on/off vent , on TS now


2.  Gallstone pancreatitis, now with ongoing bleeding from prior drain. Anemic. 

s/p Tx multiple units over several days


3.  septic shock/sepsis, recurrent 6/7, source abdomen. , off levo now, 


4.  Acute kidney injury-, Off HD--renal function decling. suspect JED on CKD due

to hypotension , improved now


5.  Acute gallstone pancreatitis.


6.  Hypoalbuminemia.


7.  Moderate persistent effusions, s/p left thora  5/12, reaccumulation of left 

effusion. O2 requirement not changed. 


8.  Fever- ,hypotension. suspect recurrent sepsis/ likely pancreatic source.  

Per ID, per surgery--


9.  Chronic anemia-- ongoing / s/p PRBC


10. Covid 19 testing negative


11. Moderate to large ascites-S/P paracentisis


12.S/P paracentisis with 4 liters removed on 4/15/20


13. S/P IR drain placement on 5/8/2020, removal, re inserted 6/7


14. Depression/Anxiety 


15. Increase effusion, ? loculated/ s/p chest tube.. drainage slowing down. 200 

cc /24 hr





Plan


.


1. TS as tolerated  titrate fio2 to keep sat 94%, off  vent 


2. Follow all cultures/ PSA from pelvic drains. Abx per ID/  thoracentesis 

result transudate, await c/s,


3. Follow surgery recs-- Acute pancreatitis with persistent necrosis ---- 4/27 

status post ROBERT drain placement + C paropsilosis; 5/6 + yeast & high amylase; s/p

additional drains on 5/8, removal of drains and now increase pseudocyst and 

increase fluid collection. likely infected fluid/ sepsis. on BS abx.follow s

urgery rec, planning surgical intervention Tuesday 


4. Follow ID recs for ABX----


5. Follow nephrology recs -----


6. Continue TPN 


7. monitor HGB,  4 units since 6/6--monitor HGB transfuse if less than 8.5 


8 s/p  thoracentesis, 5/12, 3 litres removed, s/p ct on 6/15. ct drainage, 180 

cc over the past 24 hrs,  suction -40, am cxr, flushed chest tube 6/22


9. Pt remains critically ill from severe necrotic pancreatis. Even with surgery 

prognosis grim. Surgery informed family.


10. lasix/albumin prn


11.  sputum gram neg cocobacilli. pan sensitive





     


DVT/GI PPX: protonix---


PT/OT


D/W RN, RT





CC time 30 minutes











STEVE MIRANDA MD              Jun 27, 2020 10:55

## 2020-06-27 NOTE — NUR
Pharmacy TPN Dosing Note



S: JESENIA BEAN is a 49 year old F Currently receiving Central Continuous TPN started 
03/18/20



B:Pertinent PMH: Necrotizing pancreatitis

Height: 5 feet, 8 inches

Weight: 87.3 kg



Current diet: NPO 



LABS:

Sodium:    137 

Potassium: 4.1 

Chloride:  101 

Calcium:   10.4 

Corrected Calcium: 12.72 

Magnesium: 2.1 

CO2:       36 

SCr:       0.6 

Glucose:   117-143 

Albumin:   1.1 

AST:       19 

ALT:       14 



TPN FORMULA:

TPN TYPE:  Central Continuous

AMINO ACIDS:         80 gm

DEXTROSE:            250 gm

LIPIDS:              20 gm

SODIUM CHLORIDE:     80 mEq

POTASSIUM CHLORIDE:  30 mEq

POTASSIUM ACETATE:   30 mEq

MAGNESIUM:           14 mEq

INSULIN:             15 units

MULTIPLE VITAMIN:    10 ml

TRACE ELEMENTS:      1 ml(s)



TPN PLAN:  

Continue same TPN. No labs ordered for AM due to patient stability.

-BMP ordered for 6/29





R: Continue same TPN formula.

Will monitor electrolytes, glucose, and tolerance to TPN.



 LORENZO LESLIE RPH, 06/27/20 [id number]

## 2020-06-27 NOTE — PDOC
Infectious Disease Note


Subjective:


Subjective


Patient is awake 


States feels okay


No fever





Vital Signs:


Vital Signs





Vital Signs








  Date Time  Temp Pulse Resp B/P (MAP) Pulse Ox O2 Delivery O2 Flow Rate FiO2


 


6/27/20 08:00 98.6 116 26 122/67 (85) 99 Tracheal Collar  





 98.6       


 


6/27/20 04:42       8.0 











Physical Exam:


PHYSICAL EXAM


GENERAL: Patient alert awake comfortable


HEENT: Anicteric, no thrush


NECK:  Tracheostomy 


LUNGS: Diminished aeration bases, no accessory muscle use - CT on left with 

clear fluid


HEART:  S1, S2,regular 


ABDOMEN: Mild distention, bowel sounds present, soft, grimaces to palpation, 

drains x 3


: [place] ( 6/7)


EXTREMITIES: + edema BLE


SKIN: no signs of gen rash 


LUE-PICC  without signs of complications





Medications:


Inpatient Meds:





Current Medications








 Medications


  (Trade)  Dose


 Ordered  Sig/Yee  Start Time


 Stop Time Status Last Admin


Dose Admin


 


 Acetaminophen


  (Tylenol Supp)  650 mg  PRN Q6HRS  PRN  3/24/20 10:30


    6/18/20 10:16


650 MG


 


 Acetaminophen


  (Tylenol)  650 mg  PRN Q6HRS  PRN  3/21/20 03:36


 5/13/20 10:25 DC 4/16/20 19:56


650 MG


 


 Acetylcysteine


  (Mucomyst 20%


 Resp Treatment)  600 mg  BID  6/13/20 21:00


 6/19/20 10:39 DC 6/19/20 09:33


600 MG


 


 Albumin Human  250 ml @ 


 62.5 mls/hr  1X  ONCE  6/24/20 15:30


 6/24/20 19:29 DC 6/24/20 16:27


62.5 MLS/HR


 


 Albuterol Sulfate


  (Ventolin Neb


 Soln)  2.5 mg  1X  ONCE  3/17/20 22:30


 3/17/20 22:31 DC 3/18/20 00:56


2.5 MG


 


 Albuterol/


 Ipratropium


  (Duoneb)  3 ml  Q4HRS  6/13/20 08:00


    6/27/20 04:00


3 ML


 


 Alteplase,


 Recombinant


  (Cathflo For


 Central Catheter


 Clearance)  4 mg  1X  ONCE  6/17/20 09:15


 6/17/20 09:16 UNV  





 


 Alteplase,


 Recombinant 4 mg/


 Sodium Chloride  20 ml @ 20


 mls/hr  1X  ONCE  6/17/20 10:00


 6/17/20 10:59 DC 6/17/20 10:09


20 MLS/HR


 


 Amino Acids/


 Glycerin/


 Electrolytes  1,000 ml @ 


 75 mls/hr  [id number]  4/20/20 21:15


   UNV  





 


 Artificial Tears


  (Artificial


 Tears)  1 drop  PRN Q15MIN  PRN  4/29/20 05:30


    6/23/20 21:17


1 DROP


 


 Atenolol


  (Tenormin)  100 mg  DAILY  3/17/20 09:00


 3/16/20 20:08 DC  





 


 Atropine Sulfate


  (ATROPINE 0.5mg


 SYRINGE)  0.5 mg  PRN Q5MIN  PRN  4/2/20 08:15


     





 


 Barium Sulfate


  (Varibar Thin


 Liquid Apple)  148 gm  1X  ONCE  5/26/20 11:45


 5/26/20 11:49 DC  





 


 Benzocaine


  (Hurricaine One)  1 spray  1X  ONCE  3/20/20 14:30


 3/20/20 14:31 DC 3/20/20 16:38


1 SPRAY


 


 Bisacodyl


  (Dulcolax Supp)  10 mg  STK-MED ONCE  4/27/20 10:59


 4/27/20 10:59 DC  





 


 Bumetanide


  (Bumex)  2 mg  DAILY  5/8/20 10:00


 5/18/20 17:15 DC 5/18/20 08:07


2 MG


 


 Bupivacaine HCl/


 Epinephrine Bitart


  (Sensorcain-Epi


 0.5%-1:264215 Mpf)  30 ml  STK-MED ONCE  4/27/20 10:58


 4/27/20 10:58 DC 4/27/20 12:01


7 ML


 


 Calcium Carbonate/


 Glycine


  (Tums)  500 mg  PRN [id number]  PRN  3/18/20 17:45


 5/13/20 10:25 DC  





 


 Calcium Chloride


 1000 mg/Sodium


 Chloride  110 ml @ 


 220 mls/hr  1X  ONCE  3/17/20 22:30


 3/17/20 22:59 DC 3/17/20 22:11


220 MLS/HR


 


 Calcium Chloride


 3000 mg/Sodium


 Chloride  1,030 ml @ 


 50 mls/hr  [id number]  3/19/20 08:00


 3/21/20 15:23 DC 3/21/20 02:17


50 MLS/HR


 


 Calcium Gluconate


  (Calcium


 Gluconate)  2,000 mg  1X  ONCE  3/19/20 02:15


 3/19/20 02:16 DC 3/19/20 02:19


2,000 MG


 


 Calcium Gluconate


 1000 mg/Sodium


 Chloride  110 ml @ 


 220 mls/hr  1X  ONCE  3/18/20 03:30


 3/18/20 03:59 DC 3/18/20 03:21


220 MLS/HR


 


 Calcium Gluconate


 2000 mg/Sodium


 Chloride  120 ml @ 


 220 mls/hr  1X  ONCE  3/18/20 07:30


 3/18/20 08:02 DC 3/18/20 09:05


220 MLS/HR


 


 Cefepime HCl


  (Maxipime)  2 gm  Q12HR  3/25/20 09:00


 4/8/20 09:58 DC 4/7/20 20:56


2 GM


 


 Cellulose


  (Surgicel


 Fibrillar 1x2)  1 each  STK-MED ONCE  4/6/20 11:00


 4/6/20 11:01 DC  





 


 Cellulose


  (Surgicel


 Hemostat 2x14)  1 each  STK-MED ONCE  4/27/20 10:58


 4/27/20 10:59 DC  





 


 Cellulose


  (Surgicel


 Hemostat 4x8)  1 each  STK-MED ONCE  4/27/20 10:58


 4/27/20 10:59 DC  





 


 Chlorhexidine


 Gluconate


  (Peridex)  15 ml  BID  6/13/20 09:00


 6/13/20 07:58 DC  





 


 Ciprofloxacin/


 Dextrose  200 ml @ 


 200 mls/hr  Q12HR  6/18/20 21:00


 6/25/20 08:56 DC 6/25/20 08:27


200 MLS/HR


 


 Cyclobenzaprine


 HCl


  (Flexeril)  10 mg  PRN Q6HRS  PRN  4/30/20 10:45


     





 


 Daptomycin 410 mg/


 Sodium Chloride  50 ml @ 


 100 mls/hr  Q24H  6/7/20 14:00


 6/10/20 08:30 DC 6/9/20 13:33


100 MLS/HR


 


 Daptomycin 430 mg/


 Sodium Chloride  50 ml @ 


 100 mls/hr  Q24H  4/25/20 13:00


 4/30/20 20:58 DC 4/30/20 13:00


100 MLS/HR


 


 Daptomycin 450 mg/


 Sodium Chloride  50 ml @ 


 100 mls/hr  Q24H  5/17/20 09:00


 5/21/20 08:30 DC 5/20/20 09:25


100 MLS/HR


 


 Daptomycin 485 mg/


 Sodium Chloride  50 ml @ 


 100 mls/hr  Q24H  5/4/20 11:00


 5/12/20 07:44 DC 5/11/20 13:10


100 MLS/HR


 


 Daptomycin 500 mg/


 Sodium Chloride  50 ml @ 


 100 mls/hr  Q48H  3/25/20 08:30


 4/10/20 10:07 DC 4/10/20 09:57


100 MLS/HR


 


 Dexamethasone


 Sodium Phosphate


  (Decadron)  4 mg  STK-MED ONCE  4/27/20 10:56


 4/27/20 10:57 DC  





 


 Dexmedetomidine


 HCl 400 mcg/


 Sodium Chloride  100 ml @ 0


 mls/hr  CONT  PRN  4/2/20 08:15


 5/30/20 18:31 DC 5/30/20 12:57


8 MLS/HR


 


 Dextrose


  (Dextrose


 50%-Water Syringe)  12.5 gm  PRN Q15MIN  PRN  3/16/20 09:30


     





 


 Digoxin


  (Lanoxin)  125 mcg  1X  ONCE  3/19/20 18:00


 3/19/20 18:01 DC 3/19/20 17:10


125 MCG


 


 Diphenhydramine


 HCl


  (Benadryl)  25 mg  1X PRN  PRN  4/24/20 15:45


 4/25/20 15:44 DC  





 


 Duloxetine HCl


  (Cymbalta)  30 mg  DAILY  5/10/20 14:00


 5/13/20 10:25 DC 5/11/20 09:48


30 MG


 


 Enoxaparin Sodium


  (Lovenox 100mg


 Syringe)  100 mg  Q12HR  4/21/20 21:00


   UNV  





 


 Enoxaparin Sodium


  (Lovenox 40mg


 Syringe)  40 mg  Q24H  5/17/20 17:00


 6/7/20 06:50 DC 6/5/20 17:44


40 MG


 


 Etomidate


  (Amidate)  8 mg  1X  ONCE  3/23/20 08:30


 3/23/20 08:31 DC 3/23/20 08:33


8 MG


 


 Fentanyl


  (Duragesic 50mcg/


 Hr Patch)  1 patch  Q72H  6/4/20 21:00


 6/13/20 12:00 DC 6/4/20 21:22


1 PATCH


 


 Fentanyl Citrate


  (Fentanyl 2ml


 Vial)  50 mcg  PRN Q1HR  PRN  6/13/20 06:00


     





 


 Fentanyl Citrate


  (Fentanyl 5ml


 Vial)  250 mcg  1X  ONCE  5/8/20 09:15


 5/8/20 09:16 DC 5/8/20 09:30


50 MCG


 


 Flumazenil


  (Romazicon)  0.5 mg  STK-MED ONCE  6/7/20 14:48


 6/7/20 14:48 DC  





 


 Fluoxetine HCl


  (PROzac)  20 mg  QHS  6/4/20 21:00


    6/26/20 21:29


20 MG


 


 Furosemide


  (Lasix)  40 mg  1X  ONCE  6/24/20 15:30


 6/24/20 15:33 DC 6/24/20 16:27


40 MG


 


 Haloperidol


 Lactate


  (Haldol Inj)  3 mg  1X  ONCE  5/4/20 14:30


 5/4/20 14:31 DC 5/4/20 14:37


3 MG


 


 Heparin Sodium


  (Porcine)


  (Hep Lock Adult)  500 unit  STK-MED ONCE  4/7/20 09:29


 4/7/20 09:30 DC  





 


 Heparin Sodium


  (Porcine)


  (Heparin Sodium)  5,000 unit  Q12HR  4/27/20 21:00


 5/7/20 09:59 DC 5/6/20 20:57


5,000 UNIT


 


 Heparin Sodium


  (Porcine) 1000


 unit/Sodium


 Chloride  1,001 ml @ 


 1,001 mls/hr  1X  ONCE  4/27/20 12:00


 4/27/20 12:59 DC  





 


 Hydromorphone HCl


  (Dilaudid


 Standard PCA)  12 mg  STK-MED ONCE  5/1/20 15:50


 5/12/20 11:24 DC  





 


 Hydromorphone HCl


  (Dilaudid)  1 mg  PRN Q4HRS  PRN  5/4/20 19:00


 5/18/20 17:10 DC 5/18/20 06:25


1 MG


 


 Info


  (CONTRAST GIVEN


 -- Rx MONITORING)  1 each  PRN DAILY  PRN  3/30/20 11:45


 4/1/20 11:44 DC  





 


 Info


  (Icu Electrolyte


 Protocol)  1 ea  CONT PRN  PRN  3/29/20 13:15


     





 


 Info


  (PHARMACY


 MONITORING -- do


 not chart)  1 each  PRN DAILY  PRN  4/24/20 15:45


 5/26/20 14:14 DC  





 


 Info


  (Tpn Per


 Pharmacy)  1 each  PRN DAILY  PRN  3/18/20 12:30


   UNV  





 


 Insulin Human


 Lispro


  (HumaLOG)  0-9 UNITS  Q6HRS  3/16/20 09:30


    6/24/20 18:05


4 UNITS


 


 Insulin Human


 Regular


  (HumuLIN R VIAL)  5 unit  1X  ONCE  3/17/20 22:30


 3/17/20 22:31 DC 3/17/20 22:14


5 UNIT


 


 Iohexol


  (Omnipaque 240


 Mg/ml)  30 ml  1X  ONCE  3/30/20 11:30


 3/30/20 11:33 DC 3/30/20 11:30


30 ML


 


 Iohexol


  (Omnipaque 300


 Mg/ml)  50 ml  STK-MED ONCE  4/27/20 10:58


 4/27/20 10:59 DC  





 


 Iohexol


  (Omnipaque 350


 Mg/ml)  90 ml  1X  ONCE  3/16/20 03:30


 3/16/20 03:31 DC 3/16/20 03:25


90 ML


 


 Ketorolac


 Tromethamine


  (Toradol 30mg


 Vial)  30 mg  1X  ONCE  3/16/20 03:00


 3/16/20 03:01 DC 3/16/20 02:54


30 MG


 


 Lidocaine HCl


  (Buffered


 Lidocaine 1%)  3 ml  1X  ONCE  6/15/20 13:00


 6/15/20 13:01 DC 6/15/20 13:11


12 ML


 


 Lidocaine HCl


  (Glydo


  (Lidocaine) Jelly)  1 thomas  1X  ONCE  3/20/20 14:30


 3/20/20 14:31 DC 3/20/20 16:38


1 THOMAS


 


 Lidocaine HCl


  (Lidocaine 1%


 20ml Vial)  20 ml  1X  ONCE  6/7/20 15:00


 6/7/20 15:01 DC 6/7/20 15:30


20 ML


 


 Lidocaine HCl


  (Xylocaine-Mpf


 1% 2ml Vial)  2 ml  PRN 1X  PRN  4/27/20 07:00


 4/28/20 06:59 DC  





 


 Linezolid/Dextrose  300 ml @ 


 300 mls/hr  Q12HR  5/17/20 09:00


 5/20/20 08:11 DC 5/19/20 21:08


300 MLS/HR


 


 Lorazepam


  (Ativan Inj)  0.25 mg  PRN Q4HRS  PRN  6/3/20 07:30


    6/13/20 02:25


0.25 MG


 


 Magnesium Sulfate  50 ml @ 25


 mls/hr  1X  ONCE  6/16/20 08:15


 6/16/20 10:14 DC  





 


 Meropenem 1 gm/


 Sodium Chloride  100 ml @ 


 200 mls/hr  Q12HR  6/7/20 21:00


 6/25/20 08:56 DC 6/25/20 08:27


200 MLS/HR


 


 Meropenem 500 mg/


 Sodium Chloride  50 ml @ 


 100 mls/hr  Q6HRS  5/25/20 18:00


 5/27/20 09:59 DC 5/27/20 06:02


100 MLS/HR


 


 Methylprednisolone


 Sodium Succinate


  (SOLU-Medrol


 125MG VIAL)  125 mg  1X  ONCE  6/13/20 06:15


 6/13/20 06:16 DC 6/13/20 06:26


125 MG


 


 Metoclopramide HCl


  (Reglan Vial)  10 mg  PRN Q3HRS  PRN  5/9/20 16:45


    5/14/20 04:25


10 MG


 


 Metoprolol


 Tartrate


  (Lopressor Vial)  5 mg  PRN Q6HRS  PRN  6/10/20 09:00


    6/18/20 10:14


5 MG


 


 Metronidazole  100 ml @ 


 100 mls/hr  Q8HRS  4/14/20 10:00


 4/21/20 08:10 DC 4/21/20 06:04


100 MLS/HR


 


 Micafungin Sodium


 100 mg/Dextrose  100 ml @ 


 100 mls/hr  Q24H  6/7/20 11:00


 6/25/20 08:56 DC 6/24/20 12:34


100 MLS/HR


 


 Midazolam HCl


  (Versed)  2 mg  1X  ONCE  6/7/20 15:00


 6/7/20 15:01 DC 6/7/20 15:28


1 MG


 


 Midazolam HCl 100


 mg/Sodium Chloride  100 ml @ 7


 mls/hr  CONT  PRN  3/28/20 16:00


 6/3/20 14:38 DC 4/8/20 15:35


7 MLS/HR


 


 Midazolam HCl 50


 mg/Sodium Chloride  50 ml @ 0


 mls/hr  CONT  PRN  3/23/20 08:15


 3/28/20 15:59 DC 3/26/20 22:39


7 MLS/HR


 


 Morphine Sulfate


  (Morphine


 Sulfate)  2 mg  PRN Q2HR  PRN  3/16/20 05:00


 3/17/20 14:15 DC 3/17/20 12:26


2 MG


 


 Multi-Ingred


 Cream/Lotion/Oil/


 Oint


  (Artificial


 Tears Eye


 Ointment)  1 thomas  PRN Q1HR  PRN  3/25/20 17:30


 6/3/20 14:39 DC 4/13/20 08:19


1 THOMAS


 


 Naloxone HCl


  (Narcan)  0.4 mg  STK-MED ONCE  6/7/20 14:48


 6/7/20 14:48 DC  





 


 Norepinephrine


 Bitartrate 8 mg/


 Dextrose  258 ml @ 


 13.332 mls/


 hr  CONT  PRN  6/7/20 06:30


    6/7/20 21:46


13.332 MLS/HR


 


 Ondansetron HCl


  (Zofran)  4 mg  STK-MED ONCE  4/27/20 10:56


 4/27/20 10:57 DC  





 


 Pantoprazole


 Sodium


  (PROTONIX VIAL


 for IV PUSH)  40 mg  DAILYAC  3/16/20 11:30


    6/26/20 07:30


40 MG


 


 Phenylephrine HCl


  (PHENYLEPHRINE


 in 0.9% NACL PF)  1 mg  STK-MED ONCE  4/27/20 12:34


 4/27/20 12:34 DC  





 


 Piperacillin Sod/


 Tazobactam Sod


 3.375 gm/Sodium


 Chloride  50 ml @ 


 100 mls/hr  Q6HRS  5/27/20 12:00


 6/4/20 07:26 DC 6/4/20 06:10


100 MLS/HR


 


 Piperacillin Sod/


 Tazobactam Sod


 4.5 gm/Sodium


 Chloride  100 ml @ 


 200 mls/hr  1X  ONCE  3/16/20 06:00


 3/16/20 06:29 DC 3/16/20 05:44


200 MLS/HR


 


 Potassium


 Chloride 110 meq/


 Magnesium Sulfate


 20 meq/


 Multivitamins 10


 ml/Chromium/


 Copper/Manganese/


 Seleni/Zn 1 ml/


 Insulin Human


 Regular 15 unit/


 Total Parenteral


 Nutrition/Amino


 Acids/Dextrose/


 Fat Emulsion


 Intravenous  1,800 ml @ 


 75 mls/hr  TPN  CONT  5/24/20 22:00


 5/25/20 21:59 DC 5/24/20 22:48


75 MLS/HR


 


 Potassium


 Chloride 15 meq/


 Bicarbonate


 Dialysis Soln w/


 out KCl  5,007.5 ml


  @ 1,000 mls/


 hr  Q5H1M  3/29/20 20:00


 4/2/20 13:08 DC 4/1/20 18:14


1,000 MLS/HR


 


 Potassium


 Chloride 20 meq/


 Bicarbonate


 Dialysis Soln w/


 out KCl  5,010 ml @ 


 1,000 mls/hr  Q5H1M  3/25/20 16:00


 3/29/20 19:59 DC 3/29/20 14:54


1,000 MLS/HR


 


 Potassium


 Chloride 40 meq/


 Potassium Acetate


 60 meq/Magnesium


 Sulfate 10 meq/


 Multivitamins 10


 ml/Chromium/


 Copper/Manganese/


 Seleni/Zn 1 ml/


 Insulin Human


 Regular 20 unit/


 Total Parenteral


 Nutrition/Amino


 Acids/Dextrose/


 Fat Emulsion


 Intravenous  1,800 ml @ 


 75 mls/hr  TPN  CONT  6/4/20 22:00


 6/5/20 21:59 DC 6/5/20 00:03


75 MLS/HR


 


 Potassium


 Chloride 70 meq/


 Magnesium Sulfate


 20 meq/


 Multivitamins 10


 ml/Chromium/


 Copper/Manganese/


 Seleni/Zn 1 ml/


 Insulin Human


 Regular 15 unit/


 Total Parenteral


 Nutrition/Amino


 Acids/Dextrose/


 Fat Emulsion


 Intravenous  1,800 ml @ 


 75 mls/hr  TPN  CONT  5/29/20 22:00


 5/30/20 21:59 DC 5/29/20 23:13


75 MLS/HR


 


 Potassium


 Chloride 75 meq/


 Magnesium Sulfate


 15 meq/


 Multivitamins 10


 ml/Chromium/


 Copper/Manganese/


 Seleni/Zn 0.5 ml/


 Insulin Human


 Regular 15 unit/


 Total Parenteral


 Nutrition/Amino


 Acids/Dextrose/


 Fat Emulsion


 Intravenous  1,920 ml @ 


 80 mls/hr  TPN  CONT  5/9/20 22:00


 5/10/20 21:59 DC 5/9/20 22:41


80 MLS/HR


 


 Potassium


 Chloride 75 meq/


 Magnesium Sulfate


 15 meq/Calcium


 Gluconate 8 meq/


 Multivitamins 10


 ml/Chromium/


 Copper/Manganese/


 Seleni/Zn 0.5 ml/


 Insulin Human


 Regular 15 unit/


 Total Parenteral


 Nutrition/Amino


 Acids/Dextrose/


 Fat Emulsion


 Intravenous  1,920 ml @ 


 80 mls/hr  TPN  CONT  5/7/20 22:00


 5/8/20 21:59 DC 5/7/20 22:28


80 MLS/HR


 


 Potassium


 Chloride 75 meq/


 Magnesium Sulfate


 15 meq/Calcium


 Gluconate 8 meq/


 Multivitamins 10


 ml/Chromium/


 Copper/Manganese/


 Seleni/Zn 0.5 ml/


 Insulin Human


 Regular 20 unit/


 Total Parenteral


 Nutrition/Amino


 Acids/Dextrose/


 Fat Emulsion


 Intravenous  1,920 ml @ 


 80 mls/hr  TPN  CONT  5/6/20 22:00


 5/7/20 21:59 DC 5/6/20 22:00


80 MLS/HR


 


 Potassium


 Chloride 75 meq/


 Magnesium Sulfate


 15 meq/Calcium


 Gluconate 8 meq/


 Multivitamins 10


 ml/Chromium/


 Copper/Manganese/


 Seleni/Zn 0.5 ml/


 Insulin Human


 Regular 25 unit/


 Total Parenteral


 Nutrition/Amino


 Acids/Dextrose/


 Fat Emulsion


 Intravenous  1,920 ml @ 


 80 mls/hr  TPN  CONT  5/4/20 22:00


 5/5/20 21:59 DC 5/4/20 23:08


80 MLS/HR


 


 Potassium


 Chloride 75 meq/


 Magnesium Sulfate


 20 meq/Calcium


 Gluconate 10 meq/


 Multivitamins 10


 ml/Chromium/


 Copper/Manganese/


 Seleni/Zn 0.5 ml/


 Insulin Human


 Regular 25 unit/


 Total Parenteral


 Nutrition/Amino


 Acids/Dextrose/


 Fat Emulsion


 Intravenous  1,920 ml @ 


 80 mls/hr  TPN  CONT  5/3/20 22:00


 5/4/20 21:59 DC 5/3/20 22:04


80 MLS/HR


 


 Potassium


 Chloride 75 meq/


 Magnesium Sulfate


 20 meq/Calcium


 Gluconate 10 meq/


 Multivitamins 10


 ml/Chromium/


 Copper/Manganese/


 Seleni/Zn 0.5 ml/


 Insulin Human


 Regular 30 unit/


 Total Parenteral


 Nutrition/Amino


 Acids/Dextrose/


 Fat Emulsion


 Intravenous  1,920 ml @ 


 80 mls/hr  TPN  CONT  5/2/20 22:00


 5/3/20 22:00 DC 5/2/20 21:51


80 MLS/HR


 


 Potassium


 Chloride 80 meq/


 Magnesium Sulfate


 20 meq/


 Multivitamins 10


 ml/Chromium/


 Copper/Manganese/


 Seleni/Zn 0.5 ml/


 Insulin Human


 Regular 15 unit/


 Total Parenteral


 Nutrition/Amino


 Acids/Dextrose/


 Fat Emulsion


 Intravenous  1,920 ml @ 


 80 mls/hr  TPN  CONT  5/12/20 22:00


 5/13/20 21:59 DC 5/12/20 21:40


80 MLS/HR


 


 Potassium


 Chloride 80 meq/


 Magnesium Sulfate


 20 meq/


 Multivitamins 10


 ml/Chromium/


 Copper/Manganese/


 Seleni/Zn 1 ml/


 Insulin Human


 Regular 15 unit/


 Total Parenteral


 Nutrition/Amino


 Acids/Dextrose/


 Fat Emulsion


 Intravenous  1,800 ml @ 


 75 mls/hr  TPN  CONT  5/31/20 22:00


 6/1/20 21:59 DC 5/31/20 21:54


75 MLS/HR


 


 Potassium


 Chloride 90 meq/


 Magnesium Sulfate


 20 meq/


 Multivitamins 10


 ml/Chromium/


 Copper/Manganese/


 Seleni/Zn 1 ml/


 Insulin Human


 Regular 15 unit/


 Total Parenteral


 Nutrition/Amino


 Acids/Dextrose/


 Fat Emulsion


 Intravenous  1,800 ml @ 


 75 mls/hr  TPN  CONT  5/20/20 22:00


 5/21/20 21:59 DC 5/20/20 22:28


75 MLS/HR


 


 Potassium


 Chloride 90 meq/


 Magnesium Sulfate


 20 meq/


 Multivitamins 10


 ml/Chromium/


 Copper/Manganese/


 Seleni/Zn 1 ml/


 Insulin Human


 Regular 20 unit/


 Total Parenteral


 Nutrition/Amino


 Acids/Dextrose/


 Fat Emulsion


 Intravenous  1,800 ml @ 


 75 mls/hr  TPN  CONT  6/3/20 22:00


 6/4/20 21:59 DC 6/3/20 23:13


75 MLS/HR


 


 Potassium


 Chloride/Water  100 ml @ 


 100 mls/hr  Q1H  6/17/20 08:00


 6/17/20 09:59 DC 6/17/20 09:12


100 MLS/HR


 


 Potassium


 Phosphate 20 mmol/


 Sodium Chloride  106.6667


 ml @ 


 51.667 m...  1X  ONCE  3/25/20 13:00


 3/25/20 15:03 DC 3/25/20 12:51


51.667 MLS/HR


 


 Potassium Acetate


 30 meq/Magnesium


 Sulfate 14 meq/


 Multivitamins 10


 ml/Chromium/


 Copper/Manganese/


 Seleni/Zn 1 ml/


 Insulin Human


 Regular 15 unit/


 Sodium Chloride


 20 meq/Potassium


 Chloride 30 meq/


 Total Parenteral


 Nutrition/Amino


 Acids/Dextrose/


 Fat Emulsion


 Intravenous  1,920 ml @ 


 80 mls/hr  TPN  CONT  6/23/20 22:00


 6/24/20 21:59 DC 6/23/20 21:46


80 MLS/HR


 


 Potassium Acetate


 30 meq/Magnesium


 Sulfate 20 meq/


 Calcium Gluconate


 10 meq/


 Multivitamins 10


 ml/Chromium/


 Copper/Manganese/


 Seleni/Zn 0.5 ml/


 Insulin Human


 Regular 30 unit/


 Potassium


 Chloride 30 meq/


 Total Parenteral


 Nutrition/Amino


 Acids/Dextrose/


 Fat Emulsion


 Intravenous  1,920 ml @ 


 80 mls/hr  TPN  CONT  5/1/20 22:00


 5/2/20 21:59 DC 5/1/20 22:34


80 MLS/HR


 


 Potassium Acetate


 40 meq/Magnesium


 Sulfate 10 meq/


 Multivitamins 10


 ml/Chromium/


 Copper/Manganese/


 Seleni/Zn 1 ml/


 Insulin Human


 Regular 20 unit/


 Total Parenteral


 Nutrition/Amino


 Acids/Dextrose/


 Fat Emulsion


 Intravenous  1,920 ml @ 


 80 mls/hr  TPN  CONT  6/16/20 22:00


 6/17/20 21:59 DC 6/16/20 21:32


80 MLS/HR


 


 Potassium Acetate


 40 meq/Magnesium


 Sulfate 5 meq/


 Multivitamins 10


 ml/Chromium/


 Copper/Manganese/


 Seleni/Zn 1 ml/


 Insulin Human


 Regular 30 unit/


 Total Parenteral


 Nutrition/Amino


 Acids/Dextrose/


 Fat Emulsion


 Intravenous  1,920 ml @ 


 80 mls/hr  TPN  CONT  6/15/20 22:00


 6/16/20 19:34 DC 6/15/20 21:54


80 MLS/HR


 


 Potassium Acetate


 55 meq/Magnesium


 Sulfate 20 meq/


 Calcium Gluconate


 10 meq/


 Multivitamins 10


 ml/Chromium/


 Copper/Manganese/


 Seleni/Zn 0.5 ml/


 Insulin Human


 Regular 30 unit/


 Total Parenteral


 Nutrition/Amino


 Acids/Dextrose/


 Fat Emulsion


 Intravenous  1,920 ml @ 


 80 mls/hr  TPN  CONT  4/30/20 22:00


 5/1/20 21:59 DC 5/1/20 01:00


80 MLS/HR


 


 Potassium Acetate


 55 meq/Magnesium


 Sulfate 20 meq/


 Calcium Gluconate


 10 meq/


 Multivitamins 10


 ml/Chromium/


 Copper/Manganese/


 Seleni/Zn 0.5 ml/


 Insulin Human


 Regular 35 unit/


 Total Parenteral


 Nutrition/Amino


 Acids/Dextrose/


 Fat Emulsion


 Intravenous  1,920 ml @ 


 80 mls/hr  TPN  CONT  4/28/20 22:00


 4/29/20 21:59 DC 4/28/20 22:02


80 MLS/HR


 


 Potassium Acetate


 60 meq/Magnesium


 Sulfate 10 meq/


 Multivitamins 10


 ml/Chromium/


 Copper/Manganese/


 Seleni/Zn 1 ml/


 Insulin Human


 Regular 20 unit/


 Total Parenteral


 Nutrition/Amino


 Acids/Dextrose/


 Fat Emulsion


 Intravenous  1,920 ml @ 


 80 mls/hr  TPN  CONT  6/17/20 22:00


 6/18/20 21:59 DC 6/17/20 21:55


80 MLS/HR


 


 Potassium Acetate


 60 meq/Magnesium


 Sulfate 14 meq/


 Multivitamins 10


 ml/Chromium/


 Copper/Manganese/


 Seleni/Zn 1 ml/


 Insulin Human


 Regular 15 unit/


 Sodium Chloride


 20 meq/Total


 Parenteral


 Nutrition/Amino


 Acids/Dextrose/


 Fat Emulsion


 Intravenous  1,920 ml @ 


 80 mls/hr  TPN  CONT  6/22/20 22:00


 6/23/20 21:59 DC 6/22/20 21:54


80 MLS/HR


 


 Potassium Acetate


 60 meq/Magnesium


 Sulfate 14 meq/


 Multivitamins 10


 ml/Chromium/


 Copper/Manganese/


 Seleni/Zn 1 ml/


 Insulin Human


 Regular 15 unit/


 Total Parenteral


 Nutrition/Amino


 Acids/Dextrose/


 Fat Emulsion


 Intravenous  1,920 ml @ 


 80 mls/hr  TPN  CONT  6/21/20 22:00


 6/22/20 21:59 DC 6/21/20 22:22


80 MLS/HR


 


 Potassium Acetate


 60 meq/Magnesium


 Sulfate 14 meq/


 Multivitamins 10


 ml/Chromium/


 Copper/Manganese/


 Seleni/Zn 1 ml/


 Insulin Human


 Regular 20 unit/


 Total Parenteral


 Nutrition/Amino


 Acids/Dextrose/


 Fat Emulsion


 Intravenous  1,920 ml @ 


 80 mls/hr  TPN  CONT  6/18/20 22:00


 6/19/20 21:59 DC 6/18/20 22:26


80 MLS/HR


 


 Potassium Acetate


 60 meq/Magnesium


 Sulfate 5 meq/


 Multivitamins 10


 ml/Chromium/


 Copper/Manganese/


 Seleni/Zn 1 ml/


 Insulin Human


 Regular 30 unit/


 Total Parenteral


 Nutrition/Amino


 Acids/Dextrose/


 Fat Emulsion


 Intravenous  1,920 ml @ 


 80 mls/hr  TPN  CONT  6/6/20 22:00


 6/7/20 21:59 DC 6/6/20 21:54


80 MLS/HR


 


 Potassium Acetate


 65 meq/Magnesium


 Sulfate 20 meq/


 Calcium Gluconate


 10 meq/


 Multivitamins 10


 ml/Chromium/


 Copper/Manganese/


 Seleni/Zn 0.5 ml/


 Insulin Human


 Regular 30 unit/


 Total Parenteral


 Nutrition/Amino


 Acids/Dextrose/


 Fat Emulsion


 Intravenous  1,920 ml @ 


 80 mls/hr  TPN  CONT  4/29/20 22:00


 4/30/20 21:59 DC 4/29/20 22:22


80 MLS/HR


 


 Potassium Acetate


 80 meq/Magnesium


 Sulfate 5 meq/


 Multivitamins 10


 ml/Chromium/


 Copper/Manganese/


 Seleni/Zn 1 ml/


 Insulin Human


 Regular 20 unit/


 Total Parenteral


 Nutrition/Amino


 Acids/Dextrose/


 Fat Emulsion


 Intravenous  1,920 ml @ 


 80 mls/hr  TPN  CONT  6/5/20 22:00


 6/6/20 21:59 DC 6/5/20 21:59


80 MLS/HR


 


 Prochlorperazine


 Edisylate


  (Compazine)  5 mg  PACU PRN  PRN  4/27/20 07:00


 4/28/20 06:59 DC  





 


 Propofol  100 ml @ 0


 mls/hr  CONT  PRN  6/13/20 06:00


    6/20/20 23:50


2.7 MLS/HR


 


 Ringer's Solution  1,000 ml @ 


 30 mls/hr  Q24H  4/27/20 07:00


 4/27/20 18:59 DC  





 


 Rocuronium Bromide


  (Zemuron)  50 mg  STK-MED ONCE  4/27/20 10:56


 4/27/20 10:57 DC  





 


 Saliva Substitute


  (Biotene


 Moisturizing


 Mouth)  2 spray  PRN Q15MIN  PRN  5/21/20 11:00


     





 


 Sevoflurane


  (Ultane)  60 ml  STK-MED ONCE  4/27/20 12:26


 4/27/20 12:27 DC  





 


 Sodium


 Bicarbonate 50


 meq/Sodium


 Chloride  1,050 ml @ 


 75 mls/hr  Q14H  3/18/20 07:30


 3/23/20 10:28 DC 3/22/20 21:10


75 MLS/HR


 


 Sodium Acetate 50


 meq/Potassium


 Acetate 55 meq/


 Magnesium Sulfate


 20 meq/Calcium


 Gluconate 10 meq/


 Multivitamins 10


 ml/Chromium/


 Copper/Manganese/


 Seleni/Zn 0.5 ml/


 Insulin Human


 Regular 35 unit/


 Total Parenteral


 Nutrition/Amino


 Acids/Dextrose/


 Fat Emulsion


 Intravenous  1,800 ml @ 


 75 mls/hr  TPN  CONT  4/25/20 22:00


 4/26/20 21:59 DC 4/25/20 22:03


75 MLS/HR


 


 Sodium Bicarbonate


  (Sodium Bicarb


 Adult 8.4% Syr)  50 meq  1X  ONCE  6/7/20 22:00


 6/7/20 22:01 DC 6/7/20 21:47


50 MEQ


 


 Sodium Chloride


  (Normal Saline


 Flush)  3 ml  QSHIFT  PRN  4/27/20 13:45


     





 


 Sodium Chloride


 80 meq/Potassium


 Chloride 30 meq/


 Potassium Acetate


 30 meq/Magnesium


 Sulfate 14 meq/


 Multivitamins 10


 ml/Chromium/


 Copper/Manganese/


 Seleni/Zn 1 ml/


 Insulin Human


 Regular 15 unit/


 Total Parenteral


 Nutrition/Amino


 Acids/Dextrose/


 Fat Emulsion


 Intravenous  1,920 ml @ 


 80 mls/hr  TPN  CONT  6/26/20 22:00


 6/27/20 21:59  6/26/20 21:32


80 MLS/HR


 


 Sodium Chloride


 90 meq/Calcium


 Gluconate 10 meq/


 Multivitamins 10


 ml/Chromium/


 Copper/Manganese/


 Seleni/Zn 0.5 ml/


 Total Parenteral


 Nutrition/Amino


 Acids/Dextrose/


 Fat Emulsion


 Intravenous  1,512 ml @ 


 63 mls/hr  TPN  CONT  3/18/20 22:00


 3/19/20 21:59 DC 3/18/20 22:06


63 MLS/HR


 


 Sodium Chloride


 90 meq/Calcium


 Gluconate 10 meq/


 Multivitamins 10


 ml/Chromium/


 Copper/Manganese/


 Seleni/Zn 1 ml/


 Total Parenteral


 Nutrition/Amino


 Acids/Dextrose/


 Fat Emulsion


 Intravenous  55.005 ml


  @ 2.292


 mls/hr  TPN  CONT  3/18/20 22:00


 3/18/20 12:33 DC  





 


 Sodium Chloride


 90 meq/Magnesium


 Sulfate 10 meq/


 Calcium Gluconate


 20 meq/


 Multivitamins 10


 ml/Chromium/


 Copper/Manganese/


 Seleni/Zn 0.5 ml/


 Total Parenteral


 Nutrition/Amino


 Acids/Dextrose/


 Fat Emulsion


 Intravenous  1,512 ml @ 


 63 mls/hr  TPN  CONT  3/19/20 22:00


 3/20/20 21:59 DC 3/19/20 22:25


63 MLS/HR


 


 Sodium Chloride


 90 meq/Magnesium


 Sulfate 12 meq/


 Calcium Gluconate


 15 meq/


 Multivitamins 10


 ml/Chromium/


 Copper/Manganese/


 Seleni/Zn 0.5 ml/


 Insulin Human


 Regular 25 unit/


 Total Parenteral


 Nutrition/Amino


 Acids/Dextrose/


 Fat Emulsion


 Intravenous  1,400 ml @ 


 58.333 mls/


 hr  TPN  CONT  4/8/20 22:00


 4/9/20 21:59 DC 4/8/20 21:41


58.333 MLS/HR


 


 Sodium Chloride


 90 meq/Potassium


 Chloride 15 meq/


 Magnesium Sulfate


 12 meq/Calcium


 Gluconate 15 meq/


 Multivitamins 10


 ml/Chromium/


 Copper/Manganese/


 Seleni/Zn 0.5 ml/


 Insulin Human


 Regular 25 unit/


 Total Parenteral


 Nutrition/Amino


 Acids/Dextrose/


 Fat Emulsion


 Intravenous  1,400 ml @ 


 58.333 mls/


 hr  TPN  CONT  4/7/20 22:00


 4/8/20 21:59 DC 4/7/20 22:13


58.333 MLS/HR


 


 Sodium Chloride


 90 meq/Potassium


 Chloride 15 meq/


 Potassium


 Phosphate 10 mmol/


 Magnesium Sulfate


 8 meq/Calcium


 Gluconate 15 meq/


 Multivitamins 10


 ml/Chromium/


 Copper/Manganese/


 Seleni/Zn 0.5 ml/


 Insulin Human


 Regular 25 unit/


 Total Parenteral


 Nutrition/Amino


 Acids/Dextrose/


 Fat Emulsion


 Intravenous  1,400 ml @ 


 58.333 mls/


 hr  TPN  CONT  4/5/20 22:00


 4/6/20 21:59 DC 4/5/20 21:20


58.333 MLS/HR


 


 Sodium Chloride


 90 meq/Potassium


 Chloride 15 meq/


 Potassium


 Phosphate 10 mmol/


 Magnesium Sulfate


 10 meq/Calcium


 Gluconate 20 meq/


 Multivitamins 10


 ml/Chromium/


 Copper/Manganese/


 Seleni/Zn 0.5 ml/


 Total Parenteral


 Nutrition/Amino


 Acids/Dextrose/


 Fat Emulsion


 Intravenous  1,400 ml @ 


 58.333 mls/


 hr  TPN  CONT  3/23/20 22:00


 3/24/20 21:59 DC 3/23/20 21:42


58.333 MLS/HR


 


 Sodium Chloride


 90 meq/Potassium


 Chloride 15 meq/


 Potassium


 Phosphate 10 mmol/


 Magnesium Sulfate


 12 meq/Calcium


 Gluconate 15 meq/


 Multivitamins 10


 ml/Chromium/


 Copper/Manganese/


 Seleni/Zn 0.5 ml/


 Insulin Human


 Regular 25 unit/


 Total Parenteral


 Nutrition/Amino


 Acids/Dextrose/


 Fat Emulsion


 Intravenous  1,400 ml @ 


 58.333 mls/


 hr  TPN  CONT  4/6/20 22:00


 4/7/20 21:59 DC 4/6/20 22:24


58.333 MLS/HR


 


 Sodium Chloride


 90 meq/Potassium


 Chloride 15 meq/


 Potassium


 Phosphate 15 mmol/


 Magnesium Sulfate


 10 meq/Calcium


 Gluconate 15 meq/


 Multivitamins 10


 ml/Chromium/


 Copper/Manganese/


 Seleni/Zn 0.5 ml/


 Total Parenteral


 Nutrition/Amino


 Acids/Dextrose/


 Fat Emulsion


 Intravenous  1,400 ml @ 


 58.333 mls/


 hr  TPN  CONT  3/24/20 22:00


 3/25/20 21:59 DC 3/24/20 22:17


58.333 MLS/HR


 


 Sodium Chloride


 90 meq/Potassium


 Chloride 15 meq/


 Potassium


 Phosphate 15 mmol/


 Magnesium Sulfate


 10 meq/Calcium


 Gluconate 20 meq/


 Multivitamins 10


 ml/Chromium/


 Copper/Manganese/


 Seleni/Zn 0.5 ml/


 Total Parenteral


 Nutrition/Amino


 Acids/Dextrose/


 Fat Emulsion


 Intravenous  1,200 ml @ 


 50 mls/hr  TPN  CONT  3/22/20 22:00


 3/22/20 14:17 DC  





 


 Sodium Chloride


 90 meq/Potassium


 Chloride 15 meq/


 Potassium


 Phosphate 18 mmol/


 Magnesium Sulfate


 8 meq/Calcium


 Gluconate 15 meq/


 Multivitamins 10


 ml/Chromium/


 Copper/Manganese/


 Seleni/Zn 0.5 ml/


 Insulin Human


 Regular 10 unit/


 Total Parenteral


 Nutrition/Amino


 Acids/Dextrose/


 Fat Emulsion


 Intravenous  1,400 ml @ 


 58.333 mls/


 hr  TPN  CONT  3/27/20 22:00


 3/28/20 21:59 DC 3/27/20 21:43


58.333 MLS/HR


 


 Sodium Chloride


 90 meq/Potassium


 Chloride 15 meq/


 Potassium


 Phosphate 18 mmol/


 Magnesium Sulfate


 8 meq/Calcium


 Gluconate 15 meq/


 Multivitamins 10


 ml/Chromium/


 Copper/Manganese/


 Seleni/Zn 0.5 ml/


 Insulin Human


 Regular 15 unit/


 Total Parenteral


 Nutrition/Amino


 Acids/Dextrose/


 Fat Emulsion


 Intravenous  1,400 ml @ 


 58.333 mls/


 hr  TPN  CONT  3/30/20 22:00


 3/31/20 21:59 DC 3/30/20 21:47


58.333 MLS/HR


 


 Sodium Chloride


 90 meq/Potassium


 Chloride 15 meq/


 Potassium


 Phosphate 18 mmol/


 Magnesium Sulfate


 8 meq/Calcium


 Gluconate 15 meq/


 Multivitamins 10


 ml/Chromium/


 Copper/Manganese/


 Seleni/Zn 0.5 ml/


 Insulin Human


 Regular 20 unit/


 Total Parenteral


 Nutrition/Amino


 Acids/Dextrose/


 Fat Emulsion


 Intravenous  1,400 ml @ 


 58.333 mls/


 hr  TPN  CONT  4/2/20 22:00


 4/3/20 21:59 DC 4/2/20 22:45


58.333 MLS/HR


 


 Sodium Chloride


 90 meq/Potassium


 Chloride 15 meq/


 Potassium


 Phosphate 18 mmol/


 Magnesium Sulfate


 8 meq/Calcium


 Gluconate 15 meq/


 Multivitamins 10


 ml/Chromium/


 Copper/Manganese/


 Seleni/Zn 0.5 ml/


 Total Parenteral


 Nutrition/Amino


 Acids/Dextrose/


 Fat Emulsion


 Intravenous  1,400 ml @ 


 58.333 mls/


 hr  TPN  CONT  3/26/20 22:00


 3/27/20 21:59 DC 3/26/20 22:00


58.333 MLS/HR


 


 Sodium Chloride


 90 meq/Potassium


 Phosphate 15 mmol/


 Magnesium Sulfate


 12 meq/Calcium


 Gluconate 15 meq/


 Multivitamins 10


 ml/Chromium/


 Copper/Manganese/


 Seleni/Zn 0.5 ml/


 Insulin Human


 Regular 30 unit/


 Total Parenteral


 Nutrition/Amino


 Acids/Dextrose/


 Fat Emulsion


 Intravenous  1,400 ml @ 


 58.333 mls/


 hr  TPN  CONT  4/10/20 22:00


 4/11/20 21:59 DC 4/10/20 21:49


58.333 MLS/HR


 


 Sodium Chloride


 90 meq/Potassium


 Phosphate 15 mmol/


 Magnesium Sulfate


 12 meq/Calcium


 Gluconate 15 meq/


 Multivitamins 10


 ml/Chromium/


 Copper/Manganese/


 Seleni/Zn 0.5 ml/


 Insulin Human


 Regular 40 unit/


 Total Parenteral


 Nutrition/Amino


 Acids/Dextrose/


 Fat Emulsion


 Intravenous  1,400 ml @ 


 58.333 mls/


 hr  TPN  CONT  4/11/20 22:00


 4/12/20 21:59 DC 4/11/20 21:21


58.333 MLS/HR


 


 Sodium Chloride


 90 meq/Potassium


 Phosphate 19 mmol/


 Magnesium Sulfate


 12 meq/Calcium


 Gluconate 15 meq/


 Multivitamins 10


 ml/Chromium/


 Copper/Manganese/


 Seleni/Zn 0.5 ml/


 Insulin Human


 Regular 40 unit/


 Total Parenteral


 Nutrition/Amino


 Acids/Dextrose/


 Fat Emulsion


 Intravenous  1,400 ml @ 


 58.333 mls/


 hr  TPN  CONT  4/12/20 22:00


 4/13/20 21:59 DC 4/12/20 21:54


58.333 MLS/HR


 


 Sodium Chloride


 90 meq/Potassium


 Phosphate 5 mmol/


 Magnesium Sulfate


 12 meq/Calcium


 Gluconate 15 meq/


 Multivitamins 10


 ml/Chromium/


 Copper/Manganese/


 Seleni/Zn 0.5 ml/


 Insulin Human


 Regular 30 unit/


 Total Parenteral


 Nutrition/Amino


 Acids/Dextrose/


 Fat Emulsion


 Intravenous  1,400 ml @ 


 58.333 mls/


 hr  TPN  CONT  4/9/20 22:00


 4/10/20 21:59 DC 4/9/20 22:08


58.333 MLS/HR


 


 Sodium Chloride


 100 meq/Potassium


 Chloride 40 meq/


 Magnesium Sulfate


 15 meq/Calcium


 Gluconate 15 meq/


 Multivitamins 10


 ml/Chromium/


 Copper/Manganese/


 Seleni/Zn 0.5 ml/


 Insulin Human


 Regular 35 unit/


 Total Parenteral


 Nutrition/Amino


 Acids/Dextrose/


 Fat Emulsion


 Intravenous  1,400 ml @ 


 58.333 mls/


 hr  TPN  CONT  4/19/20 22:00


 4/20/20 21:59 DC 4/19/20 22:46


58.333 MLS/HR


 


 Sodium Chloride


 100 meq/Potassium


 Chloride 40 meq/


 Magnesium Sulfate


 20 meq/Calcium


 Gluconate 10 meq/


 Multivitamins 10


 ml/Chromium/


 Copper/Manganese/


 Seleni/Zn 0.5 ml/


 Insulin Human


 Regular 35 unit/


 Total Parenteral


 Nutrition/Amino


 Acids/Dextrose/


 Fat Emulsion


 Intravenous  1,400 ml @ 


 58.333 mls/


 hr  TPN  CONT  4/23/20 22:00


 4/24/20 21:59 DC 4/24/20 00:06


58.333 MLS/HR


 


 Sodium Chloride


 100 meq/Potassium


 Chloride 40 meq/


 Magnesium Sulfate


 20 meq/Calcium


 Gluconate 15 meq/


 Multivitamins 10


 ml/Chromium/


 Copper/Manganese/


 Seleni/Zn 0.5 ml/


 Insulin Human


 Regular 35 unit/


 Total Parenteral


 Nutrition/Amino


 Acids/Dextrose/


 Fat Emulsion


 Intravenous  1,400 ml @ 


 58.333 mls/


 hr  TPN  CONT  4/22/20 22:00


 4/23/20 21:59 DC 4/22/20 22:27


58.333 MLS/HR


 


 Sodium Chloride


 100 meq/Potassium


 Phosphate 10 mmol/


 Magnesium Sulfate


 12 meq/Calcium


 Gluconate 15 meq/


 Multivitamins 10


 ml/Chromium/


 Copper/Manganese/


 Seleni/Zn 0.5 ml/


 Insulin Human


 Regular 35 unit/


 Potassium


 Chloride 20 meq/


 Total Parenteral


 Nutrition/Amino


 Acids/Dextrose/


 Fat Emulsion


 Intravenous  1,400 ml @ 


 58.333 mls/


 hr  TPN  CONT  4/16/20 22:00


 4/17/20 21:59 DC 4/16/20 22:10


58.333 MLS/HR


 


 Sodium Chloride


 100 meq/Potassium


 Phosphate 19 mmol/


 Magnesium Sulfate


 12 meq/Calcium


 Gluconate 15 meq/


 Multivitamins 10


 ml/Chromium/


 Copper/Manganese/


 Seleni/Zn 0.5 ml/


 Insulin Human


 Regular 40 unit/


 Potassium


 Chloride 20 meq/


 Total Parenteral


 Nutrition/Amino


 Acids/Dextrose/


 Fat Emulsion


 Intravenous  1,400 ml @ 


 58.333 mls/


 hr  TPN  CONT  4/15/20 22:00


 4/16/20 21:59 DC 4/15/20 21:20


58.333 MLS/HR


 


 Sodium Chloride


 100 meq/Potassium


 Phosphate 5 mmol/


 Magnesium Sulfate


 12 meq/Calcium


 Gluconate 15 meq/


 Multivitamins 10


 ml/Chromium/


 Copper/Manganese/


 Seleni/Zn 0.5 ml/


 Insulin Human


 Regular 35 unit/


 Potassium


 Chloride 20 meq/


 Total Parenteral


 Nutrition/Amino


 Acids/Dextrose/


 Fat Emulsion


 Intravenous  1,400 ml @ 


 58.333 mls/


 hr  TPN  CONT  4/17/20 22:00


 4/18/20 21:59 DC 4/17/20 22:59


58.333 MLS/HR


 


 Succinylcholine


 Chloride


  (Anectine)  120 mg  1X  ONCE  3/23/20 08:30


 3/23/20 08:31 DC 3/23/20 08:34


120 MG


 


 Vecuronium Bromide


  (Norcuron Bolus)  6 mg  PRN Q6HRS  PRN  5/7/20 19:15


 5/7/20 19:35 DC  














Labs:


Lab





Laboratory Tests








Test


 6/26/20


13:10 6/26/20


17:54 6/27/20


00:20 6/27/20


06:07


 


Glucose (Fingerstick)


 134 mg/dL


(70-99) 117 mg/dL


(70-99) 143 mg/dL


(70-99) 134 mg/dL


(70-99)








Micro


        NEG ANSON 56


        PSEUDOMONAS AERUGINOSA


        ANTIBIOTIC                        RESULT          INTERPRETATION


        AMIKACIN                          <=16                  S


        AZTREONAM                         >16                   R


        CEFTAZIDIME                       >16                   R


        CIPROFLOXACIN                     <=0.25                S


        CEFEPIME                          16                    I


        GENTAMICIN                        <=2                   S


        LEVOFLOXACIN                      <=0.5                 S














                               ** CONTINUED ON NEXT PAGE **





-----------------------------

---------------------------------------------------------------





RUN DATE: 06/10/20                  [facility] *LIVE*               

  PAGE 2   


RUN TIME: 1121                            Specimen Inquiry                    


-------------------

-------------------------------------------------------------------------





SPEC: [id number]    PATIENT: JESENIA BEAN                [id number]  

(Continued)


 

--------------------------------------------------------------------------------


------------








 

--------------------------------------------------------------------------------


------------





  Procedure                         Result                                      

         


--------------------------------------------------------------------

------------------------





  ANTIMICROBIAL SUSCEPTIBILITY  Preliminary   (continued)


        MEROPENEM                         <=1                   S


        PIPERACILLIN/TAZOBACTAM           64                    S


        TOBRAMYCIN                        <=2                   S


        Unless otherwise specified, Testing Performed by:


        [address] [address]


        For Inquires, the Physician may contact the Microbiology


        department at [phone]





 

--------------------------------------------------------------------------------


------------





Objective:


Assessment:


Patient with prolonged hospitalization


Multiple medical problems


Multiple surgical procedures





 





Fever 


Acute pancreatitis with persistent  necrosis


CT a/p 4/9


    Increased ascites. Persistent evidence of necrotizing pancreatitis with 

fluid and phlegmon


   at the pancreas


   4/27 status post ROBERT drain placement; yeast


   5/6 fluid  candida parapsilosis fluid amylase high


CT 6/7





1.   Sequela of pancreatitis with extensive pseudocysts again 


demonstrated, the right-sided collections are slightly larger since the 


prior exam, the left-sided collections are stable. 


6/7 fluid cult PSAE (MDRO),yeast





Cholelithiasis with thickening of the gallbladder wall.


Leucocytosis 


JED,Hyperkalemia, Metabolic acidosis off dialysis


Acute hypoxic resp failure ,bilateral pleural effusion and atelectasis


hypocalcemia 


Prediabetes


HTN


s/p trach


 


Abdominal fluid culture 6 /7 MDRO Pseudomonas, yeast





Plan:


Plan of Care


Observe off antibiotics


Monitor labs/temp


General surgery following


Monitor drain output


Maintain aspiration precaution


Supportive care


Contact isolation for CRE/MDRO





Discussed with nursing staff











IVAN FRANZ MD           Jun 27, 2020 08:24

## 2020-06-27 NOTE — NUR
Patient had an increase in emesis while laying flat this morning between [id number], zofran 
and compazine given without relief. NG connected to suction with some improvement, trach 
care completed and patient suctioned well, continues to cough up green bile out of MD nara 
aware.

Patient transferred to the chair with PT/OT and this RN. Patient able to bear weight better 
than previously. Chest tube dressing and right and left ROBERT dressings changed while up in 
chair. After transferring and changing dressings, patient became tachycardic (140s-160s) and 
tachypneic breathing shallow breaths, patient reminded to turn, cough, and deep breathe and 
slow breathing down. Patient then became increasingly more confused saying she "had to leave 
this club and go back to her apartment" and forgetting family members names and was unable 
to be reoriented that this was a hospital and she has been a patient for over three months. 
Rolf, patient's boyfriend, was then facetimed by this RN to attempt to re-orient with little 
success. ABG and glucose within normal limits. Patient had slight fever of 100.2, patient 
cooled off with cool wash cloths and a fan. Patient transferred back to bed. Immediately 
upon transfer back to bed, patient began vomiting green bile again. Zofran given and NG 
reconnected quickly to suction. Ativan was given thinking nausea/vomiting might be from 
anxiety and confusion, patient began to relax, HR improved dramatically to 110s/120s, family 
then at bedside to help re-orient. Will continue to monitor closely.

## 2020-06-28 VITALS — DIASTOLIC BLOOD PRESSURE: 72 MMHG | SYSTOLIC BLOOD PRESSURE: 123 MMHG

## 2020-06-28 VITALS — SYSTOLIC BLOOD PRESSURE: 126 MMHG | DIASTOLIC BLOOD PRESSURE: 75 MMHG

## 2020-06-28 VITALS — SYSTOLIC BLOOD PRESSURE: 113 MMHG | DIASTOLIC BLOOD PRESSURE: 72 MMHG

## 2020-06-28 VITALS — DIASTOLIC BLOOD PRESSURE: 70 MMHG | SYSTOLIC BLOOD PRESSURE: 130 MMHG

## 2020-06-28 VITALS — DIASTOLIC BLOOD PRESSURE: 60 MMHG | SYSTOLIC BLOOD PRESSURE: 133 MMHG

## 2020-06-28 VITALS — SYSTOLIC BLOOD PRESSURE: 110 MMHG | DIASTOLIC BLOOD PRESSURE: 68 MMHG

## 2020-06-28 VITALS — DIASTOLIC BLOOD PRESSURE: 81 MMHG | SYSTOLIC BLOOD PRESSURE: 133 MMHG

## 2020-06-28 VITALS — SYSTOLIC BLOOD PRESSURE: 129 MMHG | DIASTOLIC BLOOD PRESSURE: 80 MMHG

## 2020-06-28 VITALS — DIASTOLIC BLOOD PRESSURE: 69 MMHG | SYSTOLIC BLOOD PRESSURE: 119 MMHG

## 2020-06-28 VITALS — SYSTOLIC BLOOD PRESSURE: 117 MMHG | DIASTOLIC BLOOD PRESSURE: 75 MMHG

## 2020-06-28 VITALS — DIASTOLIC BLOOD PRESSURE: 78 MMHG | SYSTOLIC BLOOD PRESSURE: 127 MMHG

## 2020-06-28 VITALS — SYSTOLIC BLOOD PRESSURE: 99 MMHG | DIASTOLIC BLOOD PRESSURE: 75 MMHG

## 2020-06-28 VITALS — DIASTOLIC BLOOD PRESSURE: 66 MMHG | SYSTOLIC BLOOD PRESSURE: 104 MMHG

## 2020-06-28 VITALS — SYSTOLIC BLOOD PRESSURE: 112 MMHG | DIASTOLIC BLOOD PRESSURE: 66 MMHG

## 2020-06-28 VITALS — DIASTOLIC BLOOD PRESSURE: 77 MMHG | SYSTOLIC BLOOD PRESSURE: 138 MMHG

## 2020-06-28 VITALS — SYSTOLIC BLOOD PRESSURE: 120 MMHG | DIASTOLIC BLOOD PRESSURE: 59 MMHG

## 2020-06-28 VITALS — DIASTOLIC BLOOD PRESSURE: 86 MMHG | SYSTOLIC BLOOD PRESSURE: 121 MMHG

## 2020-06-28 VITALS — DIASTOLIC BLOOD PRESSURE: 58 MMHG | SYSTOLIC BLOOD PRESSURE: 122 MMHG

## 2020-06-28 LAB
ANION GAP SERPL CALC-SCNC: 1 MMOL/L (ref 6–14)
BASE EXCESS ABG: 8 MMOL/L (ref -3–3)
BASOPHILS # BLD AUTO: 0 X10^3/UL (ref 0–0.2)
BASOPHILS NFR BLD: 0 % (ref 0–3)
BUN SERPL-MCNC: 16 MG/DL (ref 7–20)
CALCIUM SERPL-MCNC: 10.5 MG/DL (ref 8.5–10.1)
CHLORIDE SERPL-SCNC: 101 MMOL/L (ref 98–107)
CO2 SERPL-SCNC: 35 MMOL/L (ref 21–32)
CREAT SERPL-MCNC: 0.6 MG/DL (ref 0.6–1)
EOSINOPHIL NFR BLD: 0.2 X10^3/UL (ref 0–0.7)
EOSINOPHIL NFR BLD: 2 % (ref 0–3)
ERYTHROCYTE [DISTWIDTH] IN BLOOD BY AUTOMATED COUNT: 17.7 % (ref 11.5–14.5)
GFR SERPLBLD BASED ON 1.73 SQ M-ARVRAT: 106.3 ML/MIN
GLUCOSE SERPL-MCNC: 129 MG/DL (ref 70–99)
HCO3 BLDA-SCNC: 34 MMOL/L (ref 21–28)
HCT VFR BLD CALC: 26 % (ref 36–47)
HGB BLD-MCNC: 8.5 G/DL (ref 12–15.5)
INSPIRATION SETTING TIME VENT: 33
LYMPHOCYTES # BLD: 1.4 X10^3/UL (ref 1–4.8)
LYMPHOCYTES NFR BLD AUTO: 12 % (ref 24–48)
MAGNESIUM SERPL-MCNC: 2 MG/DL (ref 1.8–2.4)
MCH RBC QN AUTO: 28 PG (ref 25–35)
MCHC RBC AUTO-ENTMCNC: 33 G/DL (ref 31–37)
MCV RBC AUTO: 85 FL (ref 79–100)
MONO #: 1 X10^3/UL (ref 0–1.1)
MONOCYTES NFR BLD: 8 % (ref 0–9)
NEUT #: 9.7 X10^3/UL (ref 1.8–7.7)
NEUTROPHILS NFR BLD AUTO: 78 % (ref 31–73)
PCO2 BLDA: 55 MMHG (ref 35–46)
PLATELET # BLD AUTO: 394 X10^3/UL (ref 140–400)
PO2 BLDA: 108 MMHG (ref 75–108)
POTASSIUM SERPL-SCNC: 4.5 MMOL/L (ref 3.5–5.1)
RBC # BLD AUTO: 3.06 X10^6/UL (ref 3.5–5.4)
SAO2 % BLDA: 97 % (ref 92–99)
SODIUM SERPL-SCNC: 137 MMOL/L (ref 136–145)
WBC # BLD AUTO: 12.4 X10^3/UL (ref 4–11)

## 2020-06-28 RX ADMIN — ACETYLCYSTEINE SCH MG: 200 INHALANT RESPIRATORY (INHALATION) at 19:40

## 2020-06-28 RX ADMIN — Medication PRN EACH: at 10:04

## 2020-06-28 RX ADMIN — IPRATROPIUM BROMIDE AND ALBUTEROL SULFATE SCH ML: .5; 3 SOLUTION RESPIRATORY (INHALATION) at 08:43

## 2020-06-28 RX ADMIN — ACETAMINOPHEN PRN MG: 650 SUPPOSITORY RECTAL at 20:49

## 2020-06-28 RX ADMIN — IPRATROPIUM BROMIDE AND ALBUTEROL SULFATE SCH ML: .5; 3 SOLUTION RESPIRATORY (INHALATION) at 04:40

## 2020-06-28 RX ADMIN — IPRATROPIUM BROMIDE AND ALBUTEROL SULFATE SCH ML: .5; 3 SOLUTION RESPIRATORY (INHALATION) at 12:18

## 2020-06-28 RX ADMIN — MEROPENEM SCH MLS/HR: 500 INJECTION, POWDER, FOR SOLUTION INTRAVENOUS at 20:03

## 2020-06-28 RX ADMIN — ACETYLCYSTEINE SCH MG: 200 INHALANT RESPIRATORY (INHALATION) at 09:50

## 2020-06-28 RX ADMIN — INSULIN LISPRO SCH UNITS: 100 INJECTION, SOLUTION INTRAVENOUS; SUBCUTANEOUS at 00:00

## 2020-06-28 RX ADMIN — IPRATROPIUM BROMIDE AND ALBUTEROL SULFATE SCH ML: .5; 3 SOLUTION RESPIRATORY (INHALATION) at 19:40

## 2020-06-28 RX ADMIN — IPRATROPIUM BROMIDE AND ALBUTEROL SULFATE SCH ML: .5; 3 SOLUTION RESPIRATORY (INHALATION) at 16:24

## 2020-06-28 RX ADMIN — INSULIN LISPRO SCH UNITS: 100 INJECTION, SOLUTION INTRAVENOUS; SUBCUTANEOUS at 12:00

## 2020-06-28 RX ADMIN — DAPTOMYCIN SCH MLS/HR: 500 INJECTION, POWDER, LYOPHILIZED, FOR SOLUTION INTRAVENOUS at 20:03

## 2020-06-28 RX ADMIN — PANTOPRAZOLE SODIUM SCH MG: 40 INJECTION, POWDER, FOR SOLUTION INTRAVENOUS at 08:23

## 2020-06-28 RX ADMIN — INSULIN LISPRO SCH UNITS: 100 INJECTION, SOLUTION INTRAVENOUS; SUBCUTANEOUS at 17:25

## 2020-06-28 RX ADMIN — INSULIN LISPRO SCH UNITS: 100 INJECTION, SOLUTION INTRAVENOUS; SUBCUTANEOUS at 06:00

## 2020-06-28 NOTE — NUR
Pharmacy TPN Dosing Note



S: JESENIA BEAN is a 49 year old F Currently receiving Central Continuous TPN started 
03/18/20



B:Pertinent PMH: Necrotizing pancreatitis

Height: 5 feet, 8 inches

Weight: 87.9 kg



Current diet: NPO 



LABS:

Sodium:    137 

Potassium: 4.5 

Chloride:  101 

Calcium:   10.5 

Corrected Calcium: 12.82 

Magnesium: 2 

CO2:       35 

SCr:       0.6 

Glucose:   121-129 

Albumin:   1.1 

AST:       19 

ALT:       14 



TPN FORMULA:

TPN TYPE:  Central Continuous

AMINO ACIDS:         80 gm

DEXTROSE:            250 gm

LIPIDS:              20 gm

SODIUM CHLORIDE:     80 mEq

POTASSIUM CHLORIDE:  30 mEq

POTASSIUM ACETATE:   30 mEq

MAGNESIUM:           14 mEq

INSULIN:             15 units

MULTIPLE VITAMIN:    10 ml

TRACE ELEMENTS:      1 ml(s)



TPN PLAN:  

Continue same TPN.

-BMP in AM.





R: Continue same TPN formula.

Will monitor electrolytes, glucose, and tolerance to TPN.



 LORENZO LESLIE [facility], 06/28/20 0496

## 2020-06-28 NOTE — NUR
Due to increase in confusion and change in respiratory pattern, patient was transferred back 
to ICU status. ABG, CXR, and labs completed this morning.

## 2020-06-28 NOTE — NUR
Notified Dr. Philip of CXR and shallow breathing without distress and ABG within normal 
limits, received the order to place patient back on ventilator if patient is in distress but 
is fine on trach shield at 33% for now. 

Patient's daughter was at bedside earlier, surgical questions answered by Dr. Flores at the 
bedside. 

Patient remains confused but more alert than yesterday, vital signs stable.

## 2020-06-28 NOTE — PDOC
PROGRESS NOTES


Chief Complaint


Chief Complaint





History of Present Illness


50yo F w/ PMHx HTN, prediabetes who presented the emergency room complaints of 

abdominal pain. Patient described off and on 3 days. She states is constant, 

described as a squeezing sensation in a band-like distribution. + nausea, 

vomiting.  She denies any fever or diarrhea.  Patient denies any abdominal 

surgical procedures.  She stateed is worse with movements, car ride.  Pain 

initially was upper abdomen however now pretty much generalized.  Last bowel 

movement was 3/15/2020. Nothing makes her pain better.  Patient denies any 

shortness of breath.  She does state the pain moves into her chest.  Denies any 

headache or visual changes.


Lipase 98924, , , Bilirubin 1.4.


CT abdomen confirms pancreatic inflammation, peripancreatic fluid and 

inflammatory changes around the pancreas consistent with pancreatitis. 

Cholelithiasis and 1.4cm uterine fibroid as well as possible left salpingitis. 

Admitted for further care


Gallstone pancreatitis with necrosis. 


   -CT A/P 6/6 showed multiple pseudocysts, slight larger on the right. s/p 

drains x 3, 6/7.  + PSAE (MDRO-R Cefepime, Zosyn ANSON < 64) and yeast, 


   -s/p drain 4/27. C. parapsilosis. s/p drain 5/6 + yeast & high amylase; s/p 

additional drain on 5/8. Drains removed. 


Ascites s/p paracentesis 4/15 & 5/6. C. parapsilosis 


JED. off HD. 





Impression and plan:


Acute hypoxic Respiratory failure required  mechanical ventilation


Tracheostomy


bilateral pleural effusions/pulm edema s/p Throacentesis on 6/15/2020


Severe Acute gallstone pancreatitis (not a surgical candidate at this time) with

necrosis


Acute kidney failure now requiring dialysis


Gallstones (Calculus of gallbladder with acute cholecystitis without 

obstruction)


HTN 


Intractable pain


Intractable nausea


Covid 19 negative. 


Acute on chronic anemia 


EEG: No seizure activityFever  - better currently - intermittent could be from 

underlying pancreatitis blood cults 5/4 - neg so far


? Ileus with vomiting


Abd distention - U/S and CT reviewed s/p 0.4 L of opaque, debris-containing 

ascites was removed 5/6


Acute pancreatitis with persistent necrosis


Gallstone pancreatitis with necrosis. 


   -CT A/P 6/6 showed multiple pseudocysts, slight larger on the right. s/p 

drains x 3, 6/7.  + PSAE (MDRO-R Cefepime, Zosyn ANSON < 64) and yeast, 


   -s/p drain 4/27. C. parapsilosis. s/p drain 5/6 + yeast & high amylase; s/p 

additional drain on 5/8. Drains removed. 


Ascites s/p paracentesis 4/15 & 5/6. C. parapsilosis 


JED. off HD. 


A large fluid collection in the pancreatic bed has slightly decreased in size, 

described below, the pancreas itself is difficult to  visualize, which could be 

due to necrosis or obscuration of pancreatic  parenchyma from the surrounding 

fluid collection.6/15 


- 4/27 status post ROBERT drain placement + C paropsilosis. s/p additional drains 5/ 8


Anemia - S/p PRBCs


Cholelithiasis with thickening of the gallbladder wall.


Leucocytosis improving


JED, hyperkalemia, Metabolic acidosis off dialysis


hypocalcemia 


Prediabetes


HTN


s/p trach


ESRD on HD


Hyperglycemia


severe protein-caloric malnutrition


Moderate to large left pleural effusion with atelectasis and collapse  of most 

of the left lower lobe, stable





FEN - albumin, NS at 80 cc/hr, TPN


will start Mucomyst for secretions


Off antibiotics 


PPX - SCDs, off lovenox currently, high risk for thrombosis but in light of her 

recent anemia and need for transfusion the risks do not outweigh benefits at 

this time. will continue to evaluate on a daily basis


FULL CODE


Dispo - ICU, critically ill


Poor prognosis


























37 MIN CC TIME





History of Present Illness


History of Present Illness


6/8: IR placed drain on 6/7. 4u PRBC after Hb drop. Hb 8.8 today. Off Levophed 

this morning. T-max 100.3. Much more lethargic today. CXR with left sided 

diffuse infiltrates.


6/9: Tachycardic overnight into the 140s. NGT clamped. On BIPAP currently. 

Drains with serosanguinous discharge. WBC 8, Tmax 99.6F.


6/10: Seen on [facility] shield in ICU.  Hypertensive and tachycardic. Labs stable. 

blood stained drainage from drains. Afebrile.


6/11: Seen on [facility] shield in ICU. She is a bit confused, drowsy, but when 

sitting up is conversational and confusion somewhat clears. She is asking for 

more pain medication. Stable drains, still very tachy.Na 147


6/12: Patient vomited overnight. Aspirated. Tried to pull her trach out, she was

told she would die without her trach, she said "I know, I just want to go home".

Hb 7.6. Afebrile, still very tachycardic. 1055ml out of right sided ROBERT drain


6/13: Overnight hypoxic, on BIPAP. CXR with left sided white out lung. Sig

nificant mucous plug suctioned by RT with improvement in her ABG after 2 hours 

this morning. Not really active, tired, lethargic. 890ml out of drains past 24 

hours. On vent. D/w daughter bedside.


6/14: Still on vent overnight with copious pulmonary drainage on suctioning. 

Labs stable, UOP stable 1L. Drain output still significant with 1505mL. She has 

shown her phone password [id number] and made it clear she does not want her daughters 

to access her phone at this time.


6:15: Patient quite frail, she has had such a lengthy hospital stay that she is 

certainly depressed and as documented 3 days ago is wanting to go home. Most 

likely patient is also tired of being hospitalized with an end point no where in

sight. Reassurance and encouragement provided during my visit. Plans for 

thoracentesis later in the day 


6/16: No acute events reported overnight, case discussed with nursing staff 

patient in no acute distress, complaints of the abdomimal pain during my visit, 

patient is quite depressed, reassurance has been provided, discussed with 

nursing staff at bedside, replace electrolytes 


6/17 off vent / on TS , no distress








6/25 /2020





Patient seen and examined ICU BED


guarded-long-term prognosis 


Sputum from 6/13 - growing PSA - may be colonization I to Meropenem add Cipro


Continue meropenem, has MDRP PSAE June 7 from abd


cont micafungin June 7


bleeding from Sx. site RLQ and firmness)stable


oncology consulted   


Cholelithiasis. Mild thickened appearance of the gallbladder wall. sono 6/25  

Mild right hydronephrosis.Fluid collection identified anterior to the pancreas. 

   


ct drainage 200 cc/24 hrs


d/c antibiotics , observe  6/25     


cxr pending 6/25 6/26/2020


Patient having trouble with secretions this morning, no other complaints, 

discussed with surgical ARNP. Plans for OR on Tuesday. No fever above 99.9 

overnight, remains tachycardic, medications reviewed.





6/27/2020


Patient with thick secretions, no other acute events reported overnight.  

Patient seems to be quite anxious, I have tried to provide reassurance during my

encounter regarding her upcoming surgical procedure.  Patient seems to be quite 

anxious and seems to experience panic attacks when she first wakes up thinking 

that is the day of surgery.  At the present time she seems to be medically 

optimized for planned surgery, discussed with nursing staff at bedside





6/28/2020


Patient seems to be hallucinating and having probably memories from her former 

life prior to this prolonged hospital stay which is 3 months plus in length.  

Immediate plans are for surgical intervention on Tuesday which will consist of a

Whipple procedure.  At the present time the patient remains critically stable 

her prognosis is quite guarded at best.  No acute events reported overnight 

other than the hallucinations continues to have chest tube in multiple abdominal

drains as well.  All these are probable source of infection potentially so we 

will continue to monitor, we appreciate the input of all our consultants 

involved in Ms. Parker care


  


      




















Date:  Apr 27, 2020





Pre-Op Diagnosis:


Necrotizing pancreatitis





Post-Op Diagnosis:


same





Procedure Performed:


laparoscopic exploration





Surgeon:


Frandy Flores


Asst:  Dr. Luis Santos





Anesthesia Type:


GETA plus local





Blood Loss:


50





Specimans Obtained:


cultures, debris





Findings:


1000 cc ascites suctioned off, cultures sent, diffuse debris, obliteration of 

surgical planes preventing any meaningful exploration




















 


 





 








6/1/2020


Patient seen and examined in the ICU


She appears extremely ill


She is tachypneic at 35 respirations per minute and tachycardic at 132 bpm


She is extremely encephalopathic and shaky


She appears clammy


Chart reviewed


Discussed with RN


Prognosis extremely guarded at best








5/31/2020


Patient still in ICU


Resting with no apparent distress


Chart reviewed








5/30/2020


Patient seen and examined in the ICU


She is wiping her face with a cough


Discussed with RN


Chart reviewed


We hope to get her out of the ICU later today if possible








5/29/2020


Patient seen and examined in the ICU once again


She is back on NG suction


On IV Zosyn


Has IV TPN


Sedated with Precedex but anxious still


Appears somewhat clammy and pale


Chart reviewed


Discussed with RN


She remains critically ill











 


 


BRIEF OPERATIVE NOTE


Pre-Op Diagnosis


Pancreatitis with pseudocysts, suspected infection


Post-Op Diagnosis


same


Procedure Performed


CT abdominal Drains x 3


Surgeon


Hardy


Anesthesia Type:  Conscious Sedation


Findings


3 abdominal drains, 14F, with turbid pancreatic fluid and necrotic debris in 

each.


Complications


No immediate








5/9: Patient today somewhat restless and having bilious secretions from ET tube,

imaging studies ordered, discussed with consultant. Pretty poor prognosis, 

hopefully is not a fistula, poor surgical candidate. 





5/10: Imaging with no acute events, she seems more stable today compared to 

yesterday. Encouraged as much activity as possible patient at high risk for 

severe depression.





Vitals


Vitals





Vital Signs








  Date Time  Temp Pulse Resp B/P (MAP) Pulse Ox O2 Delivery O2 Flow Rate FiO2


 


6/28/20 10:00  108 32 130/70 (90) 100 Tracheal Collar  


 


6/28/20 08:00 99.0       





 99.0       


 


6/28/20 05:05       8.0 











Physical Exam


Physical Exam


GENERAL: Patient alert awake comfortable


HEENT: Anicteric, no thrush, NG tube in place


NECK:  Tracheostomy 


LUNGS: Diminished aeration bases, no accessory muscle use - CT on left 


HEART:  S1, S2,regular 


ABDOMEN: Mild distention, bowel sounds present, soft, grimaces to palpation, 

drains x 3


: [place] ( 6/7)


EXTREMITIES: + edema BLE


SKIN: no signs of gen rash 


LUE-PICC  without signs of complications


General:  No acute distress


Heart:  Regular rate (SR/ST), Other (distant heart sounds)


Lungs:  Other (diminshed in bases)


Abdomen:  Soft, Other (drains in place)


Extremities:  No cyanosis, Other (3+ bilateral LE pitting edema)


Skin:  No rashes, No significant lesion





Labs


LABS





Laboratory Tests








Test


 6/27/20


12:15 6/27/20


17:30 6/28/20


00:30 6/28/20


06:06


 


O2 Saturation 95 % (92-99)    


 


Arterial Blood pH


 7.41


(7.35-7.45) 


 


 





 


Arterial Blood pCO2 at


Patient Temp 54 mmHg


(35-46) 


 


 





 


Arterial Blood pO2 at Patient


Temp 79 mmHg


() 


 


 





 


Arterial Blood HCO3


 34 mmol/L


(21-28) 


 


 





 


Arterial Blood Base Excess


 8 mmol/L


(-3-3) 


 


 





 


FiO2 33    


 


Glucose (Fingerstick)


 


 135 mg/dL


(70-99) 121 mg/dL


(70-99) 128 mg/dL


(70-99)


 


Test


 6/28/20


08:15 6/28/20


08:45 


 





 


White Blood Count


 12.4 x10^3/uL


(4.0-11.0) 


 


 





 


Red Blood Count


 3.06 x10^6/uL


(3.50-5.40) 


 


 





 


Hemoglobin


 8.5 g/dL


(12.0-15.5) 


 


 





 


Hematocrit


 26.0 %


(36.0-47.0) 


 


 





 


Mean Corpuscular Volume 85 fL ()    


 


Mean Corpuscular Hemoglobin 28 pg (25-35)    


 


Mean Corpuscular Hemoglobin


Concent 33 g/dL


(31-37) 


 


 





 


Red Cell Distribution Width


 17.7 %


(11.5-14.5) 


 


 





 


Platelet Count


 394 x10^3/uL


(140-400) 


 


 





 


Neutrophils (%) (Auto) 78 % (31-73)    


 


Lymphocytes (%) (Auto) 12 % (24-48)    


 


Monocytes (%) (Auto) 8 % (0-9)    


 


Eosinophils (%) (Auto) 2 % (0-3)    


 


Basophils (%) (Auto) 0 % (0-3)    


 


Neutrophils # (Auto)


 9.7 x10^3/uL


(1.8-7.7) 


 


 





 


Lymphocytes # (Auto)


 1.4 x10^3/uL


(1.0-4.8) 


 


 





 


Monocytes # (Auto)


 1.0 x10^3/uL


(0.0-1.1) 


 


 





 


Eosinophils # (Auto)


 0.2 x10^3/uL


(0.0-0.7) 


 


 





 


Basophils # (Auto)


 0.0 x10^3/uL


(0.0-0.2) 


 


 





 


Sodium Level


 137 mmol/L


(136-145) 


 


 





 


Potassium Level


 4.5 mmol/L


(3.5-5.1) 


 


 





 


Chloride Level


 101 mmol/L


() 


 


 





 


Carbon Dioxide Level


 35 mmol/L


(21-32) 


 


 





 


Anion Gap 1 (6-14)    


 


Blood Urea Nitrogen


 16 mg/dL


(7-20) 


 


 





 


Creatinine


 0.6 mg/dL


(0.6-1.0) 


 


 





 


Estimated GFR


(Cockcroft-Gault) 106.3 


 


 


 





 


Glucose Level


 129 mg/dL


(70-99) 


 


 





 


Calcium Level


 10.5 mg/dL


(8.5-10.1) 


 


 





 


Magnesium Level


 2.0 mg/dL


(1.8-2.4) 


 


 





 


O2 Saturation  97 % (92-99)   


 


Arterial Blood pH


 


 7.41


(7.35-7.45) 


 





 


Arterial Blood pCO2 at


Patient Temp 


 55 mmHg


(35-46) 


 





 


Arterial Blood pO2 at Patient


Temp 


 108 mmHg


() 


 





 


Arterial Blood HCO3


 


 34 mmol/L


(21-28) 


 





 


Arterial Blood Base Excess


 


 8 mmol/L


(-3-3) 


 





 


FiO2  33   











Assessment and Plan


Assessmemt and Plan


Problems


Medical Problems:


(1) Acute pancreatitis


Status: Acute  





(2) Cholelithiasis


Status: Acute  











Comment


Review of Relevant


I have reviewed the following items josy (where applicable) has been applied.


Labs





Laboratory Tests








Test


 6/26/20


13:10 6/26/20


17:54 6/27/20


00:20 6/27/20


06:07


 


Glucose (Fingerstick)


 134 mg/dL


(70-99) 117 mg/dL


(70-99) 143 mg/dL


(70-99) 134 mg/dL


(70-99)


 


Test


 6/27/20


12:15 6/27/20


17:30 6/28/20


00:30 6/28/20


06:06


 


O2 Saturation 95 % (92-99)    


 


Arterial Blood pH


 7.41


(7.35-7.45) 


 


 





 


Arterial Blood pCO2 at


Patient Temp 54 mmHg


(35-46) 


 


 





 


Arterial Blood pO2 at Patient


Temp 79 mmHg


() 


 


 





 


Arterial Blood HCO3


 34 mmol/L


(21-28) 


 


 





 


Arterial Blood Base Excess


 8 mmol/L


(-3-3) 


 


 





 


FiO2 33    


 


Glucose (Fingerstick)


 


 135 mg/dL


(70-99) 121 mg/dL


(70-99) 128 mg/dL


(70-99)


 


Test


 6/28/20


08:15 6/28/20


08:45 


 





 


White Blood Count


 12.4 x10^3/uL


(4.0-11.0) 


 


 





 


Red Blood Count


 3.06 x10^6/uL


(3.50-5.40) 


 


 





 


Hemoglobin


 8.5 g/dL


(12.0-15.5) 


 


 





 


Hematocrit


 26.0 %


(36.0-47.0) 


 


 





 


Mean Corpuscular Volume 85 fL ()    


 


Mean Corpuscular Hemoglobin 28 pg (25-35)    


 


Mean Corpuscular Hemoglobin


Concent 33 g/dL


(31-37) 


 


 





 


Red Cell Distribution Width


 17.7 %


(11.5-14.5) 


 


 





 


Platelet Count


 394 x10^3/uL


(140-400) 


 


 





 


Neutrophils (%) (Auto) 78 % (31-73)    


 


Lymphocytes (%) (Auto) 12 % (24-48)    


 


Monocytes (%) (Auto) 8 % (0-9)    


 


Eosinophils (%) (Auto) 2 % (0-3)    


 


Basophils (%) (Auto) 0 % (0-3)    


 


Neutrophils # (Auto)


 9.7 x10^3/uL


(1.8-7.7) 


 


 





 


Lymphocytes # (Auto)


 1.4 x10^3/uL


(1.0-4.8) 


 


 





 


Monocytes # (Auto)


 1.0 x10^3/uL


(0.0-1.1) 


 


 





 


Eosinophils # (Auto)


 0.2 x10^3/uL


(0.0-0.7) 


 


 





 


Basophils # (Auto)


 0.0 x10^3/uL


(0.0-0.2) 


 


 





 


Sodium Level


 137 mmol/L


(136-145) 


 


 





 


Potassium Level


 4.5 mmol/L


(3.5-5.1) 


 


 





 


Chloride Level


 101 mmol/L


() 


 


 





 


Carbon Dioxide Level


 35 mmol/L


(21-32) 


 


 





 


Anion Gap 1 (6-14)    


 


Blood Urea Nitrogen


 16 mg/dL


(7-20) 


 


 





 


Creatinine


 0.6 mg/dL


(0.6-1.0) 


 


 





 


Estimated GFR


(Cockcroft-Gault) 106.3 


 


 


 





 


Glucose Level


 129 mg/dL


(70-99) 


 


 





 


Calcium Level


 10.5 mg/dL


(8.5-10.1) 


 


 





 


Magnesium Level


 2.0 mg/dL


(1.8-2.4) 


 


 





 


O2 Saturation  97 % (92-99)   


 


Arterial Blood pH


 


 7.41


(7.35-7.45) 


 





 


Arterial Blood pCO2 at


Patient Temp 


 55 mmHg


(35-46) 


 





 


Arterial Blood pO2 at Patient


Temp 


 108 mmHg


() 


 





 


Arterial Blood HCO3


 


 34 mmol/L


(21-28) 


 





 


Arterial Blood Base Excess


 


 8 mmol/L


(-3-3) 


 





 


FiO2  33   








Laboratory Tests








Test


 6/27/20


12:15 6/27/20


17:30 6/28/20


00:30 6/28/20


06:06


 


O2 Saturation 95 % (92-99)    


 


Arterial Blood pH


 7.41


(7.35-7.45) 


 


 





 


Arterial Blood pCO2 at


Patient Temp 54 mmHg


(35-46) 


 


 





 


Arterial Blood pO2 at Patient


Temp 79 mmHg


() 


 


 





 


Arterial Blood HCO3


 34 mmol/L


(21-28) 


 


 





 


Arterial Blood Base Excess


 8 mmol/L


(-3-3) 


 


 





 


FiO2 33    


 


Glucose (Fingerstick)


 


 135 mg/dL


(70-99) 121 mg/dL


(70-99) 128 mg/dL


(70-99)


 


Test


 6/28/20


08:15 6/28/20


08:45 


 





 


White Blood Count


 12.4 x10^3/uL


(4.0-11.0) 


 


 





 


Red Blood Count


 3.06 x10^6/uL


(3.50-5.40) 


 


 





 


Hemoglobin


 8.5 g/dL


(12.0-15.5) 


 


 





 


Hematocrit


 26.0 %


(36.0-47.0) 


 


 





 


Mean Corpuscular Volume 85 fL ()    


 


Mean Corpuscular Hemoglobin 28 pg (25-35)    


 


Mean Corpuscular Hemoglobin


Concent 33 g/dL


(31-37) 


 


 





 


Red Cell Distribution Width


 17.7 %


(11.5-14.5) 


 


 





 


Platelet Count


 394 x10^3/uL


(140-400) 


 


 





 


Neutrophils (%) (Auto) 78 % (31-73)    


 


Lymphocytes (%) (Auto) 12 % (24-48)    


 


Monocytes (%) (Auto) 8 % (0-9)    


 


Eosinophils (%) (Auto) 2 % (0-3)    


 


Basophils (%) (Auto) 0 % (0-3)    


 


Neutrophils # (Auto)


 9.7 x10^3/uL


(1.8-7.7) 


 


 





 


Lymphocytes # (Auto)


 1.4 x10^3/uL


(1.0-4.8) 


 


 





 


Monocytes # (Auto)


 1.0 x10^3/uL


(0.0-1.1) 


 


 





 


Eosinophils # (Auto)


 0.2 x10^3/uL


(0.0-0.7) 


 


 





 


Basophils # (Auto)


 0.0 x10^3/uL


(0.0-0.2) 


 


 





 


Sodium Level


 137 mmol/L


(136-145) 


 


 





 


Potassium Level


 4.5 mmol/L


(3.5-5.1) 


 


 





 


Chloride Level


 101 mmol/L


() 


 


 





 


Carbon Dioxide Level


 35 mmol/L


(21-32) 


 


 





 


Anion Gap 1 (6-14)    


 


Blood Urea Nitrogen


 16 mg/dL


(7-20) 


 


 





 


Creatinine


 0.6 mg/dL


(0.6-1.0) 


 


 





 


Estimated GFR


(Cockcroft-Gault) 106.3 


 


 


 





 


Glucose Level


 129 mg/dL


(70-99) 


 


 





 


Calcium Level


 10.5 mg/dL


(8.5-10.1) 


 


 





 


Magnesium Level


 2.0 mg/dL


(1.8-2.4) 


 


 





 


O2 Saturation  97 % (92-99)   


 


Arterial Blood pH


 


 7.41


(7.35-7.45) 


 





 


Arterial Blood pCO2 at


Patient Temp 


 55 mmHg


(35-46) 


 





 


Arterial Blood pO2 at Patient


Temp 


 108 mmHg


() 


 





 


Arterial Blood HCO3


 


 34 mmol/L


(21-28) 


 





 


Arterial Blood Base Excess


 


 8 mmol/L


(-3-3) 


 





 


FiO2  33   








Microbiology


6/18/20 Blood Culture - Final, Complete


          NO GROWTH AFTER 5 DAYS


6/15/20 Gram Stain - Final, Complete


          


6/15/20 Aerobic and Anaerobic Culture - Final, Complete


          


6/13/20 Gram Stain Evaluation - Final, Complete


          


6/13/20 Respiratory Culture - Final, Complete


          


6/13/20 Antimicrobic Susceptibility - Final, Complete


          


6/7/20 Urine Culture - Final, Complete


         


5/30/20 Gram Stain - Final, Complete


          


5/30/20 Aerobic Culture - Final, Complete


Medications





Current Medications


Sodium Chloride 1,000 ml @  1,000 mls/hr Q1H IV  Last administered on 3/16/20at 

03:00;  Start 3/16/20 at 03:00;  Stop 3/16/20 at 03:59;  Status DC


Ondansetron HCl (Zofran) 4 mg 1X  ONCE IVP  Last administered on 3/16/20at 

03:27;  Start 3/16/20 at 03:00;  Stop 3/16/20 at 03:01;  Status DC


Morphine Sulfate (Morphine Sulfate) 4 mg 1X  ONCE IV ;  Start 3/16/20 at 03:00; 

Stop 3/16/20 at 03:01;  Status Cancel


Ketorolac Tromethamine (Toradol 30mg Vial) 30 mg 1X  ONCE IV  Last administered 

on 3/16/20at 02:54;  Start 3/16/20 at 03:00;  Stop 3/16/20 at 03:01;  Status DC


Fentanyl Citrate (Fentanyl 2ml Vial) 25 mcg 1X  ONCE IVP  Last administered on 

3/16/20at 03:23;  Start 3/16/20 at 03:30;  Stop 3/16/20 at 03:31;  Status DC


Fentanyl Citrate (Fentanyl 2ml Vial) 100 mcg STK-MED ONCE .ROUTE ;  Start 

3/16/20 at 03:18;  Stop 3/16/20 at 03:18;  Status DC


Iohexol (Omnipaque 350 Mg/ml) 90 ml 1X  ONCE IV  Last administered on 3/16/20at 

03:25;  Start 3/16/20 at 03:30;  Stop 3/16/20 at 03:31;  Status DC


Info (CONTRAST GIVEN -- Rx MONITORING) 1 each PRN DAILY  PRN MC SEE COMMENTS;  

Start 3/16/20 at 03:30;  Stop 3/18/20 at 03:29;  Status DC


Hydromorphone HCl (Dilaudid) 0.5 mg 1X  ONCE IV  Last administered on 3/16/20at 

03:55;  Start 3/16/20 at 04:30;  Stop 3/16/20 at 04:32;  Status DC


Ondansetron HCl (Zofran) 4 mg PRN Q8HRS  PRN IV NAUSEA/VOMITING 1ST CHOICE;  

Start 3/16/20 at 05:00;  Stop 3/16/20 at 09:27;  Status DC


Morphine Sulfate (Morphine Sulfate) 2 mg PRN Q2HR  PRN IV SEVERE PAIN 7-10 Last 

administered on 3/17/20at 12:26;  Start 3/16/20 at 05:00;  Stop 3/17/20 at 

14:15;  Status DC


Sodium Chloride 1,000 ml @  125 mls/hr Q8H IV  Last administered on 3/16/20at 

20:56;  Start 3/16/20 at 05:00;  Stop 3/17/20 at 04:59;  Status DC


Hydromorphone HCl (Dilaudid) 0.5 mg PRN Q3HRS  PRN IV SEVERE PAIN 7-10 Last 

administered on 3/17/20at 10:06;  Start 3/16/20 at 05:00;  Stop 3/17/20 at 

12:01;  Status DC


Piperacillin Sod/ Tazobactam Sod 4.5 gm/Sodium Chloride 100 ml @  200 mls/hr 1X 

ONCE IV  Last administered on 3/16/20at 05:44;  Start 3/16/20 at 06:00;  Stop 

3/16/20 at 06:29;  Status DC


Ondansetron HCl (Zofran) 4 mg PRN Q4HRS  PRN IV NAUSEA/VOMITING 1ST CHOICE Last 

administered on 6/27/20at 13:37;  Start 3/16/20 at 09:30


Insulin Human Lispro (HumaLOG) 0-9 UNITS Q6HRS SQ  Last administered on 

6/24/20at 18:05;  Start 3/16/20 at 09:30


Dextrose (Dextrose 50%-Water Syringe) 12.5 gm PRN Q15MIN  PRN IV SEE COMMENTS;  

Start 3/16/20 at 09:30


Pantoprazole Sodium (PROTONIX VIAL for IV PUSH) 40 mg DAILYAC IVP  Last 

administered on 6/28/20at 08:23;  Start 3/16/20 at 11:30


Prochlorperazine Edisylate (Compazine) 10 mg PRN Q6HRS  PRN IV NAUSEA/VOMITING, 

2nd CHOICE Last administered on 6/27/20at 10:53;  Start 3/16/20 at 17:45


Atenolol (Tenormin) 100 mg DAILY PO ;  Start 3/17/20 at 09:00;  Stop 3/16/20 at 

20:08;  Status DC


Metoprolol Tartrate (Lopressor Vial) 2.5 mg Q6HRS IVP  Last administered on 

3/17/20at 05:51;  Start 3/16/20 at 20:15;  Stop 3/17/20 at 10:02;  Status DC


Metoprolol Tartrate (Lopressor Vial) 5 mg Q6HRS IVP  Last administered on 

3/26/20at 00:12;  Start 3/17/20 at 10:15;  Stop 3/28/20 at 08:48;  Status DC


Hydromorphone HCl (Dilaudid) 1 mg PRN Q3HRS  PRN IV SEVERE PAIN 7-10 Last 

administered on 3/23/20at 05:13;  Start 3/17/20 at 12:00;  Stop 3/31/20 at 

00:25;  Status DC


Lidocaine HCl (Buffered Lidocaine 1%) 3 ml STK-MED ONCE .ROUTE ;  Start 3/17/20 

at 12:55;  Stop 3/17/20 at 12:56;  Status DC


Albumin Human 500 ml @  125 mls/hr 1X  ONCE IV  Last administered on 3/17/20at 

14:33;  Start 3/17/20 at 14:30;  Stop 3/17/20 at 18:32;  Status DC


Norepinephrine Bitartrate 8 mg/ Dextrose 258 ml @  17.299 mls/ hr CONT  PRN IV 

PER PROTOCOL Last administered on 4/14/20at 12:48;  Start 3/17/20 at 15:30;  

Stop 4/17/20 at 09:19;  Status DC


Sodium Chloride 1,000 ml @  125 mls/hr Q8H IV  Last administered on 3/17/20at 

21:04;  Start 3/17/20 at 16:00;  Stop 3/18/20 at 02:42;  Status DC


Albumin Human 500 ml @  125 mls/hr PRN BID  PRN IV After every 2L NSS & BP < 

90mm Last administered on 6/6/20at 11:40;  Start 3/17/20 at 16:00


Iohexol (Omnipaque 300 Mg/ml) 60 ml 1X  ONCE IV  Last administered on 3/17/20at 

17:20;  Start 3/17/20 at 17:00;  Stop 3/17/20 at 17:01;  Status DC


Info (CONTRAST GIVEN -- Rx MONITORING) 1 each PRN DAILY  PRN MC SEE COMMENTS;  

Start 3/17/20 at 17:00;  Stop 3/19/20 at 16:59;  Status DC


Meropenem 1 gm/ Sodium Chloride 100 ml @  200 mls/hr Q8HRS IV  Last administered

on 3/18/20at 05:45;  Start 3/17/20 at 20:00;  Stop 3/18/20 at 08:48;  Status DC


Furosemide (Lasix) 40 mg 1X  ONCE IVP  Last administered on 3/17/20at 22:12;  

Start 3/17/20 at 22:30;  Stop 3/17/20 at 22:31;  Status DC


Calcium Chloride 1000 mg/Sodium Chloride 110 ml @  220 mls/hr 1X  ONCE IV  Last 

administered on 3/17/20at 22:11;  Start 3/17/20 at 22:30;  Stop 3/17/20 at 

22:59;  Status DC


Albuterol Sulfate (Ventolin Neb Soln) 2.5 mg 1X  ONCE NEB  Last administered on 

3/18/20at 00:56;  Start 3/17/20 at 22:30;  Stop 3/17/20 at 22:31;  Status DC


Insulin Human Regular (HumuLIN R VIAL) 5 unit 1X  ONCE IV  Last administered on 

3/17/20at 22:14;  Start 3/17/20 at 22:30;  Stop 3/17/20 at 22:31;  Status DC


Magnesium Sulfate 50 ml @ 25 mls/hr 1X  ONCE IV  Last administered on 3/18/20at 

02:57;  Start 3/18/20 at 03:00;  Stop 3/18/20 at 04:59;  Status DC


Calcium Gluconate 1000 mg/Sodium Chloride 110 ml @  220 mls/hr 1X  ONCE IV  Last

administered on 3/18/20at 02:46;  Start 3/18/20 at 03:00;  Stop 3/18/20 at 

03:29;  Status DC


Sodium Chloride 1,000 ml @  200 mls/hr Q5H IV  Last administered on 3/18/20at 

02:46;  Start 3/18/20 at 03:00;  Stop 3/18/20 at 10:21;  Status DC


Calcium Gluconate 1000 mg/Sodium Chloride 110 ml @  220 mls/hr 1X  ONCE IV  Last

administered on 3/18/20at 03:21;  Start 3/18/20 at 03:30;  Stop 3/18/20 at 

03:59;  Status DC


Sodium Bicarbonate 50 meq/Sodium Chloride 1,050 ml @  75 mls/hr Q14H IV  Last 

administered on 3/22/20at 21:10;  Start 3/18/20 at 07:30;  Stop 3/23/20 at 

10:28;  Status DC


Calcium Gluconate 2000 mg/Sodium Chloride 120 ml @  220 mls/hr 1X  ONCE IV  Last

administered on 3/18/20at 09:05;  Start 3/18/20 at 07:30;  Stop 3/18/20 at 

08:02;  Status DC


Lidocaine HCl (Xylocaine-Mpf 1% 2ml Vial) 2 ml STK-MED ONCE .ROUTE ;  Start 

3/18/20 at 08:47;  Stop 3/18/20 at 08:47;  Status DC


Meropenem 500 mg/ Sodium Chloride 50 ml @  100 mls/hr Q12HR IV  Last 

administered on 3/23/20at 21:01;  Start 3/18/20 at 18:00;  Stop 3/24/20 at 

07:58;  Status DC


Lidocaine HCl (Buffered Lidocaine 1%) 3 ml STK-MED ONCE .ROUTE ;  Start 3/18/20 

at 09:46;  Stop 3/18/20 at 09:46;  Status DC


Lidocaine HCl (Buffered Lidocaine 1%) 6 ml 1X  ONCE INJ  Last administered on 

3/18/20at 10:26;  Start 3/18/20 at 10:15;  Stop 3/18/20 at 10:16;  Status DC


Info (Tpn Per Pharmacy) 1 each PRN DAILY  PRN MC SEE COMMENTS Last administered 

on 6/28/20at 10:04;  Start 3/18/20 at 12:00


Sodium Chloride 1,000 ml @  1,000 mls/hr Q1H PRN IV hypotension;  Start 3/18/20 

at 12:07;  Stop 3/18/20 at 18:06;  Status DC


Diphenhydramine HCl (Benadryl) 25 mg 1X PRN  PRN IV ITCHING;  Start 3/18/20 at 

12:15;  Stop 3/19/20 at 12:14;  Status DC


Diphenhydramine HCl (Benadryl) 25 mg 1X PRN  PRN IV ITCHING;  Start 3/18/20 at 

12:15;  Stop 3/19/20 at 12:14;  Status DC


Sodium Chloride 1,000 ml @  400 mls/hr Q2H30M PRN IV PATENCY;  Start 3/18/20 at 

12:07;  Stop 3/19/20 at 00:06;  Status DC


Info (PHARMACY MONITORING -- do not chart) 1 each PRN DAILY  PRN MC SEE 

COMMENTS;  Start 3/18/20 at 12:15;  Stop 3/20/20 at 08:13;  Status DC


Sodium Chloride 90 meq/Calcium Gluconate 10 meq/ Multivitamins 10 ml/Chromium/ 

Copper/Manganese/ Seleni/Zn 1 ml/ Total Parenteral Nutrition/Amino Acids/

Dextrose/ Fat Emulsion Intravenous 55.005 ml  @ 2.292 mls/hr TPN  CONT IV ;  

Start 3/18/20 at 22:00;  Stop 3/18/20 at 12:33;  Status DC


Info (Tpn Per Pharmacy) 1 each PRN DAILY  PRN MC SEE COMMENTS;  Start 3/18/20 at

12:30;  Status UNV


Sodium Chloride 90 meq/Calcium Gluconate 10 meq/ Multivitamins 10 ml/Chromium/ 

Copper/Manganese/ Seleni/Zn 0.5 ml/ Total Parenteral Nutrition/Amino 

Acids/Dextrose/ Fat Emulsion Intravenous 1,512 ml @  63 mls/hr TPN  CONT IV  

Last administered on 3/18/20at 22:06;  Start 3/18/20 at 22:00;  Stop 3/19/20 at 

21:59;  Status DC


Calcium Carbonate/ Glycine (Tums) 500 mg PRN AFTMEALHC  PRN PO INDIGESTION;  

Start 3/18/20 at 17:45;  Stop 5/13/20 at 10:25;  Status DC


Calcium Gluconate (Calcium Gluconate) 2,000 mg 1X  ONCE IVP  Last administered 

on 3/19/20at 02:19;  Start 3/19/20 at 02:15;  Stop 3/19/20 at 02:16;  Status DC


Calcium Chloride 3000 mg/Sodium Chloride 1,030 ml @  50 mls/hr [id number] IV  Last 

administered on 3/21/20at 02:17;  Start 3/19/20 at 08:00;  Stop 3/21/20 at 

15:23;  Status DC


Lorazepam (Ativan Inj) 1 mg PRN Q4HRS  PRN IVP ANXIETY / AGITATION, 2nd choic 

Last administered on 4/17/20at 03:51;  Start 3/19/20 at 09:00;  Stop 4/17/20 at 

09:19;  Status DC


Sodium Chloride 1,000 ml @  1,000 mls/hr Q1H PRN IV hypotension;  Start 3/19/20 

at 08:56;  Stop 3/19/20 at 14:55;  Status DC


Albumin Human 200 ml @  200 mls/hr 1X PRN  PRN IV Hypotension;  Start 3/19/20 at

09:00;  Stop 3/19/20 at 14:59;  Status DC


Diphenhydramine HCl (Benadryl) 25 mg 1X PRN  PRN IV ITCHING;  Start 3/19/20 at 

09:00;  Stop 3/20/20 at 08:59;  Status DC


Diphenhydramine HCl (Benadryl) 25 mg 1X PRN  PRN IV ITCHING;  Start 3/19/20 at 

09:00;  Stop 3/20/20 at 08:59;  Status DC


Sodium Chloride 1,000 ml @  400 mls/hr Q2H30M PRN IV PATENCY;  Start 3/19/20 at 

08:56;  Stop 3/19/20 at 20:55;  Status DC


Info (PHARMACY MONITORING -- do not chart) 1 each PRN DAILY  PRN MC SEE 

COMMENTS;  Start 3/19/20 at 09:00;  Status UNV


Info (PHARMACY MONITORING -- do not chart) 1 each PRN DAILY  PRN MC SEE 

COMMENTS;  Start 3/19/20 at 09:00;  Stop 3/20/20 at 08:13;  Status DC


Digoxin (Lanoxin) 500 mcg 1X  ONCE IV  Last administered on 3/19/20at 10:04;  

Start 3/19/20 at 10:00;  Stop 3/19/20 at 10:01;  Status DC


Digoxin (Lanoxin) 125 mcg 1X  ONCE IV  Last administered on 3/19/20at 17:10;  

Start 3/19/20 at 18:00;  Stop 3/19/20 at 18:01;  Status DC


Magnesium Sulfate 100 ml @  25 mls/hr 1X  ONCE IV  Last administered on 

3/19/20at 12:48;  Start 3/19/20 at 13:00;  Stop 3/19/20 at 16:59;  Status DC


Sodium Chloride 90 meq/Magnesium Sulfate 10 meq/ Calcium Gluconate 20 meq/ 

Multivitamins 10 ml/Chromium/ Copper/Manganese/ Seleni/Zn 0.5 ml/ Total 

Parenteral Nutrition/Amino Acids/Dextrose/ Fat Emulsion Intravenous 1,512 ml @  

63 mls/hr TPN  CONT IV  Last administered on 3/19/20at 22:25;  Start 3/19/20 at 

22:00;  Stop 3/20/20 at 21:59;  Status DC


Sodium Chloride 1,000 ml @  1,000 mls/hr Q1H PRN IV hypotension;  Start 3/20/20 

at 08:05;  Stop 3/20/20 at 14:04;  Status DC


Albumin Human 200 ml @  200 mls/hr 1X  ONCE IV  Last administered on 3/20/20at 

08:57;  Start 3/20/20 at 08:15;  Stop 3/20/20 at 09:14;  Status DC


Diphenhydramine HCl (Benadryl) 25 mg 1X PRN  PRN IV ITCHING;  Start 3/20/20 at 

08:15;  Stop 3/21/20 at 08:14;  Status DC


Diphenhydramine HCl (Benadryl) 25 mg 1X PRN  PRN IV ITCHING;  Start 3/20/20 at 

08:15;  Stop 3/21/20 at 08:14;  Status DC


Sodium Chloride 1,000 ml @  400 mls/hr Q2H30M PRN IV PATENCY;  Start 3/20/20 at 

08:05;  Stop 3/20/20 at 20:04;  Status DC


Info (PHARMACY MONITORING -- do not chart) 1 each PRN DAILY  PRN MC SEE 

COMMENTS;  Start 3/20/20 at 08:15;  Stop 3/24/20 at 07:57;  Status DC


Sodium Chloride 90 meq/Potassium Chloride 15 meq/ Potassium Phosphate 10 mmol/ 

Magnesium Sulfate 10 meq/Calcium Gluconate 20 meq/ Multivitamins 10 ml/Chromium/

Copper/Manganese/ Seleni/Zn 0.5 ml/ Total Parenteral Nutrition/Amino 

Acids/Dextrose/ Fat Emulsion Intravenous 1,512 ml @  63 mls/hr TPN  CONT IV  

Last administered on 3/20/20at 21:01;  Start 3/20/20 at 22:00;  Stop 3/21/20 at 

21:59;  Status DC


Potassium Chloride/Water 100 ml @  100 mls/hr 1X  ONCE IV  Last administered on 

3/20/20at 14:09;  Start 3/20/20 at 14:00;  Stop 3/20/20 at 14:59;  Status DC


Benzocaine (Hurricaine One) 1 spray 1X  ONCE MM  Last administered on 3/20/20at 

16:38;  Start 3/20/20 at 14:30;  Stop 3/20/20 at 14:31;  Status DC


Lidocaine HCl (Glydo (Lidocaine) Jelly) 1 thomas 1X  ONCE MM  Last administered on 

3/20/20at 16:38;  Start 3/20/20 at 14:30;  Stop 3/20/20 at 14:31;  Status DC


Linezolid/Dextrose 300 ml @  300 mls/hr Q12HR IV  Last administered on 3/26/20at

21:04;  Start 3/20/20 at 20:00;  Stop 3/27/20 at 07:50;  Status DC


Acetaminophen (Tylenol) 650 mg PRN Q6HRS  PRN PO MILD PAIN / TEMP;  Start 

3/21/20 at 03:30;  Stop 3/21/20 at 03:36;  Status DC


Acetaminophen (Tylenol) 650 mg PRN Q6HRS  PRN PEG MILD PAIN / TEMP Last 

administered on 4/16/20at 19:56;  Start 3/21/20 at 03:36;  Stop 5/13/20 at 

10:25;  Status DC


Sodium Chloride 1,000 ml @  1,000 mls/hr Q1H PRN IV hypotension;  Start 3/21/20 

at 07:50;  Stop 3/21/20 at 13:49;  Status DC


Albumin Human 200 ml @  200 mls/hr 1X PRN  PRN IV Hypotension;  Start 3/21/20 at

08:00;  Stop 3/21/20 at 13:59;  Status DC


Sodium Chloride (Normal Saline Flush) 10 ml 1X PRN  PRN IV AP catheter pack;  

Start 3/21/20 at 08:00;  Stop 3/22/20 at 07:59;  Status DC


Sodium Chloride (Normal Saline Flush) 10 ml 1X PRN  PRN IV  catheter pack;  

Start 3/21/20 at 08:00;  Stop 3/22/20 at 07:59;  Status DC


Sodium Chloride 1,000 ml @  400 mls/hr Q2H30M PRN IV PATENCY;  Start 3/21/20 at 

07:50;  Stop 3/21/20 at 19:49;  Status DC


Info (PHARMACY MONITORING -- do not chart) 1 each PRN DAILY  PRN MC SEE 

COMMENTS;  Start 3/21/20 at 08:00;  Status UNV


Info (PHARMACY MONITORING -- do not chart) 1 each PRN DAILY  PRN MC SEE 

COMMENTS;  Start 3/21/20 at 08:00;  Stop 3/23/20 at 08:25;  Status DC


Sodium Chloride 90 meq/Potassium Chloride 15 meq/ Potassium Phosphate 10 mmol/ 

Magnesium Sulfate 10 meq/Calcium Gluconate 20 meq/ Multivitamins 10 ml/Chromium/

Copper/Manganese/ Seleni/Zn 0.5 ml/ Total Parenteral Nutrition/Amino 

Acids/Dextrose/ Fat Emulsion Intravenous 1,512 ml @  63 mls/hr TPN  CONT IV  Las

t administered on 3/21/20at 20:57;  Start 3/21/20 at 22:00;  Stop 3/22/20 at 

21:59;  Status DC


Sodium Chloride 90 meq/Potassium Chloride 15 meq/ Potassium Phosphate 15 mmol/ 

Magnesium Sulfate 10 meq/Calcium Gluconate 20 meq/ Multivitamins 10 ml/Chromium/

Copper/Manganese/ Seleni/Zn 0.5 ml/ Total Parenteral Nutrition/Amino 

Acids/Dextrose/ Fat Emulsion Intravenous 1,512 ml @  63 mls/hr TPN  CONT IV ;  

Start 3/22/20 at 22:00;  Stop 3/22/20 at 14:16;  Status DC


Sodium Chloride 90 meq/Potassium Chloride 15 meq/ Potassium Phosphate 15 mmol/ 

Magnesium Sulfate 10 meq/Calcium Gluconate 20 meq/ Multivitamins 10 ml/Chromium/

Copper/Manganese/ Seleni/Zn 0.5 ml/ Total Parenteral Nutrition/Amino 

Acids/Dextrose/ Fat Emulsion Intravenous 1,200 ml @  50 mls/hr TPN  CONT IV ;  

Start 3/22/20 at 22:00;  Stop 3/22/20 at 14:17;  Status DC


Sodium Chloride 90 meq/Potassium Chloride 15 meq/ Potassium Phosphate 10 mmol/ 

Magnesium Sulfate 10 meq/Calcium Gluconate 20 meq/ Multivitamins 10 ml/Chromium/

Copper/Manganese/ Seleni/Zn 0.5 ml/ Total Parenteral Nutrition/Amino 

Acids/Dextrose/ Fat Emulsion Intravenous 1,200 ml @  50 mls/hr TPN  CONT IV  

Last administered on 3/22/20at 23:29;  Start 3/22/20 at 22:00;  Stop 3/23/20 at 

21:59;  Status DC


Sodium Chloride 1,000 ml @  1,000 mls/hr Q1H PRN IV hypotension;  Start 3/23/20 

at 07:28;  Stop 3/23/20 at 13:27;  Status DC


Albumin Human 200 ml @  200 mls/hr 1X  ONCE IV  Last administered on 3/23/20at 

08:51;  Start 3/23/20 at 07:30;  Stop 3/23/20 at 08:29;  Status DC


Diphenhydramine HCl (Benadryl) 25 mg 1X PRN  PRN IV ITCHING;  Start 3/23/20 at 

07:30;  Stop 3/24/20 at 07:29;  Status DC


Diphenhydramine HCl (Benadryl) 25 mg 1X PRN  PRN IV ITCHING;  Start 3/23/20 at 

07:30;  Stop 3/24/20 at 07:29;  Status DC


Sodium Chloride 1,000 ml @  400 mls/hr Q2H30M PRN IV PATENCY;  Start 3/23/20 at 

07:28;  Stop 3/23/20 at 19:27;  Status DC


Info (PHARMACY MONITORING -- do not chart) 1 each PRN DAILY  PRN MC SEE 

COMMENTS;  Start 3/23/20 at 07:30;  Stop 4/3/20 at 13:01;  Status DC


Metronidazole 100 ml @  100 mls/hr Q6HRS IV  Last administered on 4/8/20at 

06:26;  Start 3/23/20 at 08:30;  Stop 4/8/20 at 09:58;  Status DC


Micafungin Sodium 100 mg/Dextrose 100 ml @  100 mls/hr Q24H IV  Last 

administered on 4/30/20at 08:18;  Start 3/23/20 at 09:00;  Stop 4/30/20 at 

20:58;  Status DC


Propofol 0 ml @ As Directed STK-MED ONCE IV ;  Start 3/23/20 at 07:53;  Stop 

3/23/20 at 07:53;  Status DC


Etomidate (Amidate) 20 mg STK-MED ONCE IV ;  Start 3/23/20 at 07:53;  Stop 

3/23/20 at 07:54;  Status DC


Midazolam HCl (Versed) 5 mg STK-MED ONCE .ROUTE ;  Start 3/23/20 at 07:57;  Stop

3/23/20 at 07:57;  Status DC


Fentanyl Citrate 30 ml @ 0 mls/hr CONT  PRN IV SEE PROTOCOL Last administered on

4/17/20at 06:12;  Start 3/23/20 at 08:15;  Stop 4/17/20 at 09:19;  Status DC


Artificial Tears (Artificial Tears) 1 drop PRN Q1HR  PRN OU DRY EYE, 1st choice;

 Start 3/23/20 at 08:15;  Stop 4/29/20 at 05:31;  Status DC


Midazolam HCl 50 mg/Sodium Chloride 50 ml @ 0 mls/hr CONT  PRN IV SEE PROTOCOL 

Last administered on 3/26/20at 22:39;  Start 3/23/20 at 08:15;  Stop 3/28/20 at 

15:59;  Status DC


Etomidate (Amidate) 8 mg 1X  ONCE IV  Last administered on 3/23/20at 08:33;  

Start 3/23/20 at 08:30;  Stop 3/23/20 at 08:31;  Status DC


Succinylcholine Chloride (Anectine) 120 mg 1X  ONCE IV  Last administered on 

3/23/20at 08:34;  Start 3/23/20 at 08:30;  Stop 3/23/20 at 08:31;  Status DC


Midazolam HCl (Versed) 5 mg 1X  ONCE IV ;  Start 3/23/20 at 08:30;  Stop 3/23/20

at 08:31;  Status DC


Potassium Chloride 15 meq/ Bicarbonate Dialysis Soln w/ out KCl 5,007.5 ml  @ 

1,000 mls/ hr Q5H1M IV  Last administered on 3/24/20at 11:11;  Start 3/23/20 at 

12:00;  Stop 3/24/20 at 11:15;  Status DC


Potassium Chloride 15 meq/ Bicarbonate Dialysis Soln w/ out KCl 5,007.5 ml  @ 

1,000 mls/ hr Q5H1M IV  Last administered on 3/24/20at 11:12;  Start 3/23/20 at 

12:00;  Stop 3/24/20 at 11:17;  Status DC


Potassium Chloride 15 meq/ Bicarbonate Dialysis Soln w/ out KCl 5,007.5 ml  @ 

1,000 mls/ hr Q5H1M IV  Last administered on 3/24/20at 11:11;  Start 3/23/20 at 

12:00;  Stop 3/24/20 at 11:19;  Status DC


Sodium Chloride 90 meq/Potassium Chloride 15 meq/ Potassium Phosphate 10 mmol/ 

Magnesium Sulfate 10 meq/Calcium Gluconate 20 meq/ Multivitamins 10 ml/Chromium/

Copper/Manganese/ Seleni/Zn 0.5 ml/ Total Parenteral Nutrition/Amino 

Acids/Dextrose/ Fat Emulsion Intravenous 1,400 ml @  58.333 mls/ hr TPN  CONT IV

 Last administered on 3/23/20at 21:42;  Start 3/23/20 at 22:00;  Stop 3/24/20 at

21:59;  Status DC


Heparin Sodium (Porcine) (Heparin Sodium) 5,000 unit Q8HRS SQ  Last administered

on 3/28/20at 05:55;  Start 3/23/20 at 15:00;  Stop 3/28/20 at 13:28;  Status DC


Meropenem 500 mg/ Sodium Chloride 50 ml @  100 mls/hr Q6HRS IV  Last 

administered on 3/25/20at 06:00;  Start 3/24/20 at 09:00;  Stop 3/25/20 at 

07:29;  Status DC


Potassium Phosphate 20 mmol/ Sodium Chloride 106.6667 ml @  51.667 m... 1X  ONCE

IV  Last administered on 3/24/20at 11:22;  Start 3/24/20 at 10:15;  Stop 3/24/20

at 12:18;  Status DC


Acetaminophen (Tylenol Supp) 650 mg PRN Q6HRS  PRN IL MILD PAIN / TEMP > 100.3'F

Last administered on 6/18/20at 10:16;  Start 3/24/20 at 10:30


Potassium Chloride/Water 100 ml @  100 mls/hr Q1H IV  Last administered on 

3/24/20at 12:12;  Start 3/24/20 at 11:00;  Stop 3/24/20 at 12:59;  Status DC


Potassium Chloride 20 meq/ Bicarbonate Dialysis Soln w/ out KCl 5,010 ml @  

1,000 mls/hr Q5H1M IV  Last administered on 3/25/20at 08:48;  Start 3/24/20 at 

12:00;  Stop 3/25/20 at 13:03;  Status DC


Potassium Chloride 20 meq/ Bicarbonate Dialysis Soln w/ out KCl 5,010 ml @  

1,000 mls/hr Q5H1M IV  Last administered on 3/29/20at 14:52;  Start 3/24/20 at 

11:30;  Stop 3/29/20 at 19:59;  Status DC


Potassium Chloride 20 meq/ Bicarbonate Dialysis Soln w/ out KCl 5,010 ml @  

1,000 mls/hr Q5H1M IV  Last administered on 3/29/20at 14:53;  Start 3/24/20 at 

11:30;  Stop 3/29/20 at 19:59;  Status DC


Sodium Chloride 90 meq/Potassium Chloride 15 meq/ Potassium Phosphate 15 mmol/ 

Magnesium Sulfate 10 meq/Calcium Gluconate 15 meq/ Multivitamins 10 ml/Chromium/

Copper/Manganese/ Seleni/Zn 0.5 ml/ Total Parenteral Nutrition/Amino 

Acids/Dextrose/ Fat Emulsion Intravenous 1,400 ml @  58.333 mls/ hr TPN  CONT IV

 Last administered on 3/24/20at 22:17;  Start 3/24/20 at 22:00;  Stop 3/25/20 at

21:59;  Status DC


Cefepime HCl (Maxipime) 2 gm Q12HR IVP  Last administered on 4/7/20at 20:56;  

Start 3/25/20 at 09:00;  Stop 4/8/20 at 09:58;  Status DC


Daptomycin 500 mg/ Sodium Chloride 50 ml @  100 mls/hr Q48H IV  Last 

administered on 4/10/20at 09:57;  Start 3/25/20 at 08:30;  Stop 4/10/20 at 

10:07;  Status DC


Lidocaine HCl (Buffered Lidocaine 1%) 3 ml 1X  ONCE INJ  Last administered on 

3/25/20at 10:27;  Start 3/25/20 at 10:30;  Stop 3/25/20 at 10:31;  Status DC


Potassium Phosphate 20 mmol/ Sodium Chloride 106.6667 ml @  51.667 m... 1X  ONCE

IV  Last administered on 3/25/20at 12:51;  Start 3/25/20 at 13:00;  Stop 3/25/20

at 15:03;  Status DC


Sodium Chloride 90 meq/Potassium Chloride 15 meq/ Potassium Phosphate 18 mmol/ 

Magnesium Sulfate 8 meq/Calcium Gluconate 15 meq/ Multivitamins 10 ml/Chromium/ 

Copper/Manganese/ Seleni/Zn 0.5 ml/ Total Parenteral Nutrition/Amino 

Acids/Dextrose/ Fat Emulsion Intravenous 1,400 ml @  58.333 mls/ hr TPN  CONT IV

 Last administered on 3/25/20at 22:16;  Start 3/25/20 at 22:00;  Stop 3/26/20 at

21:59;  Status DC


Potassium Chloride 20 meq/ Bicarbonate Dialysis Soln w/ out KCl 5,010 ml @  

1,000 mls/hr Q5H1M IV  Last administered on 3/29/20at 14:54;  Start 3/25/20 at 

16:00;  Stop 3/29/20 at 19:59;  Status DC


Multi-Ingred Cream/Lotion/Oil/ Oint (Artificial Tears Eye Ointment) 1 thomas PRN 

Q1HR  PRN OU DRY EYE, 2nd choice Last administered on 4/13/20at 08:19;  Start 

3/25/20 at 17:30;  Stop 6/3/20 at 14:39;  Status DC


Sodium Chloride 90 meq/Potassium Chloride 15 meq/ Potassium Phosphate 18 mmol/ 

Magnesium Sulfate 8 meq/Calcium Gluconate 15 meq/ Multivitamins 10 ml/Chromium/ 

Copper/Manganese/ Seleni/Zn 0.5 ml/ Total Parenteral Nutrition/Amino 

Acids/Dextrose/ Fat Emulsion Intravenous 1,400 ml @  58.333 mls/ hr TPN  CONT IV

 Last administered on 3/26/20at 22:00;  Start 3/26/20 at 22:00;  Stop 3/27/20 at

21:59;  Status DC


Albumin Human 500 ml @  125 mls/hr 1X  ONCE IV ;  Start 3/26/20 at 14:15;  Stop 

3/26/20 at 18:14;  Status DC


Sodium Chloride 90 meq/Potassium Chloride 15 meq/ Potassium Phosphate 18 mmol/ 

Magnesium Sulfate 8 meq/Calcium Gluconate 15 meq/ Multivitamins 10 ml/Chromium/ 

Copper/Manganese/ Seleni/Zn 0.5 ml/ Insulin Human Regular 10 unit/ Total 

Parenteral Nutrition/Amino Acids/Dextrose/ Fat Emulsion Intravenous 1,400 ml @  

58.333 mls/ hr TPN  CONT IV  Last administered on 3/27/20at 21:43;  Start 

3/27/20 at 22:00;  Stop 3/28/20 at 21:59;  Status DC


Lidocaine HCl (Buffered Lidocaine 1%) 3 ml STK-MED ONCE .ROUTE ;  Start 3/25/20 

at 10:00;  Stop 3/27/20 at 13:57;  Status DC


Midazolam HCl 100 mg/Sodium Chloride 100 ml @ 7 mls/hr CONT  PRN IV SEE PROTOCOL

Last administered on 4/8/20at 15:35;  Start 3/28/20 at 16:00;  Stop 6/3/20 at 

14:38;  Status DC


Sodium Chloride 90 meq/Potassium Chloride 15 meq/ Potassium Phosphate 18 mmol/ 

Magnesium Sulfate 8 meq/Calcium Gluconate 15 meq/ Multivitamins 10 ml/Chromium/ 

Copper/Manganese/ Seleni/Zn 0.5 ml/ Insulin Human Regular 15 unit/ Total 

Parenteral Nutrition/Amino Acids/Dextrose/ Fat Emulsion Intravenous 1,400 ml @  

58.333 mls/ hr TPN  CONT IV  Last administered on 3/28/20at 20:34;  Start 

3/28/20 at 22:00;  Stop 3/29/20 at 21:59;  Status DC


Info ([facility] Electrolyte Protocol) 1 ea CONT PRN  PRN MC PER PROTOCOL;  Start 

3/29/20 at 13:15


Sodium Chloride 90 meq/Potassium Chloride 15 meq/ Potassium Phosphate 18 mmol/ 

Magnesium Sulfate 8 meq/Calcium Gluconate 15 meq/ Multivitamins 10 ml/Chromium/ 

Copper/Manganese/ Seleni/Zn 0.5 ml/ Insulin Human Regular 15 unit/ Total 

Parenteral Nutrition/Amino Acids/Dextrose/ Fat Emulsion Intravenous 1,400 ml @  

58.333 mls/ hr TPN  CONT IV  Last administered on 3/29/20at 22:05;  Start 

3/29/20 at 22:00;  Stop 3/30/20 at 21:59;  Status DC


Potassium Chloride 15 meq/ Bicarbonate Dialysis Soln w/ out KCl 5,007.5 ml  @ 

1,000 mls/ hr Q5H1M IV  Last administered on 4/1/20at 18:14;  Start 3/29/20 at 

20:00;  Stop 4/2/20 at 13:08;  Status DC


Potassium Chloride 15 meq/ Bicarbonate Dialysis Soln w/ out KCl 5,007.5 ml  @ 

1,000 mls/ hr Q5H1M IV  Last administered on 4/1/20at 18:14;  Start 3/29/20 at 

20:00;  Stop 4/2/20 at 13:08;  Status DC


Potassium Chloride 15 meq/ Bicarbonate Dialysis Soln w/ out KCl 5,007.5 ml  @ 

1,000 mls/ hr Q5H1M IV  Last administered on 4/1/20at 18:14;  Start 3/29/20 at 

20:00;  Stop 4/2/20 at 13:08;  Status DC


Iohexol (Omnipaque 240 Mg/ml) 30 ml 1X  ONCE PO  Last administered on 3/30/20at 

11:30;  Start 3/30/20 at 11:30;  Stop 3/30/20 at 11:33;  Status DC


Info (CONTRAST GIVEN -- Rx MONITORING) 1 each PRN DAILY  PRN MC SEE COMMENTS;  

Start 3/30/20 at 11:45;  Stop 4/1/20 at 11:44;  Status DC


Sodium Chloride 90 meq/Potassium Chloride 15 meq/ Potassium Phosphate 18 mmol/ 

Magnesium Sulfate 8 meq/Calcium Gluconate 15 meq/ Multivitamins 10 ml/Chromium/ 

Copper/Manganese/ Seleni/Zn 0.5 ml/ Insulin Human Regular 15 unit/ Total 

Parenteral Nutrition/Amino Acids/Dextrose/ Fat Emulsion Intravenous 1,400 ml @  

58.333 mls/ hr TPN  CONT IV  Last administered on 3/30/20at 21:47;  Start 

3/30/20 at 22:00;  Stop 3/31/20 at 21:59;  Status DC


Sodium Chloride 90 meq/Potassium Chloride 15 meq/ Potassium Phosphate 18 mmol/ 

Magnesium Sulfate 8 meq/Calcium Gluconate 15 meq/ Multivitamins 10 ml/Chromium/ 

Copper/Manganese/ Seleni/Zn 0.5 ml/ Insulin Human Regular 20 unit/ Total 

Parenteral Nutrition/Amino Acids/Dextrose/ Fat Emulsion Intravenous 1,400 ml @  

58.333 mls/ hr TPN  CONT IV  Last administered on 3/31/20at 21:36;  Start 

3/31/20 at 22:00;  Stop 4/1/20 at 21:59;  Status DC


Alteplase, Recombinant (Cathflo For Central Catheter Clearance) 1 mg 1X  ONCE 

INT CAT  Last administered on 3/31/20at 20:03;  Start 3/31/20 at 19:30;  Stop 

3/31/20 at 19:46;  Status DC


Alteplase, Recombinant (Cathflo For Central Catheter Clearance) 1 mg 1X  ONCE 

INT CAT  Last administered on 3/31/20at 22:05;  Start 3/31/20 at 22:00;  Stop 

3/31/20 at 22:01;  Status DC


Sodium Chloride 90 meq/Potassium Chloride 15 meq/ Potassium Phosphate 18 mmol/ 

Magnesium Sulfate 8 meq/Calcium Gluconate 15 meq/ Multivitamins 10 ml/Chromium/ 

Copper/Manganese/ Seleni/Zn 0.5 ml/ Insulin Human Regular 20 unit/ Total 

Parenteral Nutrition/Amino Acids/Dextrose/ Fat Emulsion Intravenous 1,400 ml @  

58.333 mls/ hr TPN  CONT IV  Last administered on 4/1/20at 21:30;  Start 4/1/20 

at 22:00;  Stop 4/2/20 at 21:59;  Status DC


Dexmedetomidine HCl 400 mcg/ Sodium Chloride 100 ml @ 0 mls/hr CONT  PRN IV 

ANXIETY / AGITATION Last administered on 5/30/20at 12:57;  Start 4/2/20 at 

08:15;  Stop 5/30/20 at 18:31;  Status DC


Sodium Chloride 500 ml @  500 mls/hr 1X PRN  PRN IV ELEVATED BP, SEE COMMENTS;  

Start 4/2/20 at 08:15


Atropine Sulfate (ATROPINE 0.5mg SYRINGE) 0.5 mg PRN Q5MIN  PRN IV SEE COMMENTS;

 Start 4/2/20 at 08:15


Furosemide (Lasix) 20 mg 1X  ONCE IVP  Last administered on 4/2/20at 08:19;  

Start 4/2/20 at 08:15;  Stop 4/2/20 at 08:16;  Status DC


Lidocaine HCl (Buffered Lidocaine 1%) 3 ml STK-MED ONCE .ROUTE ;  Start 4/2/20 

at 08:39;  Stop 4/2/20 at 08:39;  Status DC


Lidocaine HCl (Buffered Lidocaine 1%) 6 ml 1X  ONCE INJ  Last administered on 

4/2/20at 09:05;  Start 4/2/20 at 09:00;  Stop 4/2/20 at 09:06;  Status DC


Sodium Chloride 90 meq/Potassium Chloride 15 meq/ Potassium Phosphate 18 mmol/ 

Magnesium Sulfate 8 meq/Calcium Gluconate 15 meq/ Multivitamins 10 ml/Chromium/ 

Copper/Manganese/ Seleni/Zn 0.5 ml/ Insulin Human Regular 20 unit/ Total 

Parenteral Nutrition/Amino Acids/Dextrose/ Fat Emulsion Intravenous 1,400 ml @  

58.333 mls/ hr TPN  CONT IV  Last administered on 4/2/20at 22:45;  Start 4/2/20 

at 22:00;  Stop 4/3/20 at 21:59;  Status DC


Sodium Chloride 1,000 ml @  1,000 mls/hr Q1H PRN IV hypotension;  Start 4/3/20 

at 07:30;  Stop 4/3/20 at 13:29;  Status DC


Albumin Human 200 ml @  200 mls/hr 1X PRN  PRN IV Hypotension Last administered 

on 4/3/20at 09:36;  Start 4/3/20 at 07:30;  Stop 4/3/20 at 13:29;  Status DC


Sodium Chloride (Normal Saline Flush) 10 ml 1X PRN  PRN IV AP catheter pack;  

Start 4/3/20 at 07:30;  Stop 4/3/20 at 21:29;  Status DC


Sodium Chloride (Normal Saline Flush) 10 ml 1X PRN  PRN IV  catheter pack;  

Start 4/3/20 at 07:30;  Stop 4/4/20 at 07:29;  Status DC


Sodium Chloride 1,000 ml @  400 mls/hr Q2H30M PRN IV PATENCY;  Start 4/3/20 at 

07:30;  Stop 4/3/20 at 19:29;  Status DC


Info (PHARMACY MONITORING -- do not chart) 1 each PRN DAILY  PRN MC SEE 

COMMENTS;  Start 4/3/20 at 07:30;  Stop 4/3/20 at 13:02;  Status DC


Info (PHARMACY MONITORING -- do not chart) 1 each PRN DAILY  PRN MC SEE 

COMMENTS;  Start 4/3/20 at 07:30;  Stop 4/5/20 at 12:45;  Status DC


Sodium Chloride 90 meq/Potassium Chloride 15 meq/ Potassium Phosphate 10 mmol/ 

Magnesium Sulfate 8 meq/Calcium Gluconate 15 meq/ Multivitamins 10 ml/Chromium/ 

Copper/Manganese/ Seleni/Zn 0.5 ml/ Insulin Human Regular 25 unit/ Total Pa

renteral Nutrition/Amino Acids/Dextrose/ Fat Emulsion Intravenous 1,400 ml @  

58.333 mls/ hr TPN  CONT IV  Last administered on 4/3/20at 22:19;  Start 4/3/20 

at 22:00;  Stop 4/4/20 at 21:59;  Status DC


Heparin Sodium (Porcine) (Heparin Sodium) 5,000 unit Q12HR SQ  Last administered

on 4/26/20at 08:59;  Start 4/3/20 at 21:00;  Stop 4/26/20 at 10:05;  Status DC


Ondansetron HCl (Zofran) 4 mg PRN Q6HRS  PRN IV NAUSEA/VOMITING;  Start 4/6/20 

at 07:00;  Stop 4/7/20 at 06:59;  Status DC


Fentanyl Citrate (Fentanyl 2ml Vial) 25 mcg PRN Q5MIN  PRN IV MILD PAIN 1-3;  

Start 4/6/20 at 07:00;  Stop 4/7/20 at 06:59;  Status DC


Fentanyl Citrate (Fentanyl 2ml Vial) 50 mcg PRN Q5MIN  PRN IV MODERATE TO SEVERE

PAIN;  Start 4/6/20 at 07:00;  Stop 4/7/20 at 06:59;  Status DC


Ringer's Solution 1,000 ml @  30 mls/hr Q24H IV ;  Start 4/6/20 at 07:00;  Stop 

4/6/20 at 18:59;  Status DC


Lidocaine HCl (Xylocaine-Mpf 1% 2ml Vial) 2 ml PRN 1X  PRN ID PRIOR TO IV START;

 Start 4/6/20 at 07:00;  Stop 4/7/20 at 06:59;  Status DC


Prochlorperazine Edisylate (Compazine) 5 mg PACU PRN  PRN IV NAUSEA, MRX1;  

Start 4/6/20 at 07:00;  Stop 4/7/20 at 06:59;  Status DC


Sodium Chloride 1,000 ml @  1,000 mls/hr Q1H PRN IV hypotension;  Start 4/4/20 

at 09:10;  Stop 4/4/20 at 15:09;  Status DC


Albumin Human 200 ml @  200 mls/hr 1X PRN  PRN IV Hypotension Last administered 

on 4/4/20at 10:10;  Start 4/4/20 at 09:15;  Stop 4/4/20 at 15:14;  Status DC


Sodium Chloride 1,000 ml @  400 mls/hr Q2H30M PRN IV PATENCY;  Start 4/4/20 at 

09:10;  Stop 4/4/20 at 21:09;  Status DC


Info (PHARMACY MONITORING -- do not chart) 1 each PRN DAILY  PRN MC SEE 

COMMENTS;  Start 4/4/20 at 09:15;  Stop 4/5/20 at 12:45;  Status DC


Info (PHARMACY MONITORING -- do not chart) 1 each PRN DAILY  PRN MC SEE 

COMMENTS;  Start 4/4/20 at 09:15;  Stop 4/5/20 at 12:45;  Status DC


Sodium Chloride 90 meq/Potassium Chloride 15 meq/ Potassium Phosphate 10 mmol/ 

Magnesium Sulfate 8 meq/Calcium Gluconate 15 meq/ Multivitamins 10 ml/Chromium/ 

Copper/Manganese/ Seleni/Zn 0.5 ml/ Insulin Human Regular 25 unit/ Total 

Parenteral Nutrition/Amino Acids/Dextrose/ Fat Emulsion Intravenous 1,400 ml @  

58.333 mls/ hr TPN  CONT IV  Last administered on 4/4/20at 22:10;  Start 4/4/20 

at 22:00;  Stop 4/5/20 at 21:59;  Status DC


Magnesium Sulfate 50 ml @ 25 mls/hr PRN DAILY  PRN IV for Mag < 1.7 on am labs 

Last administered on 6/18/20at 10:57;  Start 4/5/20 at 09:15


Sodium Chloride 90 meq/Potassium Chloride 15 meq/ Potassium Phosphate 10 mmol/ 

Magnesium Sulfate 8 meq/Calcium Gluconate 15 meq/ Multivitamins 10 ml/Chromium/ 

Copper/Manganese/ Seleni/Zn 0.5 ml/ Insulin Human Regular 25 unit/ Total 

Parenteral Nutrition/Amino Acids/Dextrose/ Fat Emulsion Intravenous 1,400 ml @  

58.333 mls/ hr TPN  CONT IV  Last administered on 4/5/20at 21:20;  Start 4/5/20 

at 22:00;  Stop 4/6/20 at 21:59;  Status DC


Sodium Chloride 1,000 ml @  1,000 mls/hr Q1H PRN IV hypotension;  Start 4/5/20 

at 12:23;  Stop 4/5/20 at 18:22;  Status DC


Albumin Human 200 ml @  200 mls/hr 1X  ONCE IV  Last administered on 4/5/20at 

13:34;  Start 4/5/20 at 12:30;  Stop 4/5/20 at 13:29;  Status DC


Diphenhydramine HCl (Benadryl) 25 mg 1X PRN  PRN IV ITCHING;  Start 4/5/20 at 

12:30;  Stop 4/6/20 at 12:29;  Status DC


Diphenhydramine HCl (Benadryl) 25 mg 1X PRN  PRN IV ITCHING;  Start 4/5/20 at 

12:30;  Stop 4/6/20 at 12:29;  Status DC


Info (PHARMACY MONITORING -- do not chart) 1 each PRN DAILY  PRN MC SEE 

COMMENTS;  Start 4/5/20 at 12:30;  Status Cancel


Bupivacaine HCl/ Epinephrine Bitart (Sensorcain-Epi 0.5%-1:550059 Mpf) 30 ml 

STK-MED ONCE .ROUTE  Last administered on 4/6/20at 11:44;  Start 4/6/20 at 

11:00;  Stop 4/6/20 at 11:01;  Status DC


Cellulose (Surgicel Fibrillar 1x2) 1 each STK-MED ONCE .ROUTE ;  Start 4/6/20 at

11:00;  Stop 4/6/20 at 11:01;  Status DC


Sodium Chloride 90 meq/Potassium Chloride 15 meq/ Potassium Phosphate 10 mmol/ 

Magnesium Sulfate 12 meq/Calcium Gluconate 15 meq/ Multivitamins 10 ml/Chromium/

Copper/Manganese/ Seleni/Zn 0.5 ml/ Insulin Human Regular 25 unit/ Total 

Parenteral Nutrition/Amino Acids/Dextrose/ Fat Emulsion Intravenous 1,400 ml @  

58.333 mls/ hr TPN  CONT IV  Last administered on 4/6/20at 22:24;  Start 4/6/20 

at 22:00;  Stop 4/7/20 at 21:59;  Status DC


Propofol 20 ml @ As Directed STK-MED ONCE IV ;  Start 4/6/20 at 11:07;  Stop 

4/6/20 at 11:07;  Status DC


Cellulose (Surgicel Hemostat 4x8) 1 each STK-MED ONCE .ROUTE  Last administered 

on 4/6/20at 11:44;  Start 4/6/20 at 11:55;  Stop 4/6/20 at 11:56;  Status DC


Sevoflurane (Ultane) 60 ml STK-MED ONCE IH ;  Start 4/6/20 at 12:46;  Stop 

4/6/20 at 12:46;  Status DC


Sodium Chloride 1,000 ml @  1,000 mls/hr Q1H PRN IV hypotension;  Start 4/6/20 

at 13:51;  Stop 4/6/20 at 19:50;  Status DC


Albumin Human 200 ml @  200 mls/hr 1X PRN  PRN IV Hypotension Last administered 

on 4/6/20at 14:51;  Start 4/6/20 at 14:00;  Stop 4/6/20 at 19:59;  Status DC


Diphenhydramine HCl (Benadryl) 25 mg 1X PRN  PRN IV ITCHING;  Start 4/6/20 at 

14:00;  Stop 4/7/20 at 13:59;  Status DC


Diphenhydramine HCl (Benadryl) 25 mg 1X PRN  PRN IV ITCHING;  Start 4/6/20 at 

14:00;  Stop 4/7/20 at 13:59;  Status DC


Sodium Chloride 1,000 ml @  400 mls/hr Q2H30M PRN IV PATENCY;  Start 4/6/20 at 

13:51;  Stop 4/7/20 at 01:50;  Status DC


Info (PHARMACY MONITORING -- do not chart) 1 each PRN DAILY  PRN MC SEE 

COMMENTS;  Start 4/6/20 at 14:00;  Stop 4/9/20 at 08:16;  Status DC


Heparin Sodium (Porcine) (Hep Lock Adult) 500 unit STK-MED ONCE IVP ;  Start 

4/7/20 at 09:29;  Stop 4/7/20 at 09:30;  Status DC


Sodium Chloride 1,000 ml @  1,000 mls/hr Q1H PRN IV hypotension;  Start 4/7/20 

at 10:43;  Stop 4/7/20 at 16:42;  Status DC


Sodium Chloride 1,000 ml @  400 mls/hr Q2H30M PRN IV PATENCY;  Start 4/7/20 at 

10:43;  Stop 4/7/20 at 22:42;  Status DC


Info (PHARMACY MONITORING -- do not chart) 1 each PRN DAILY  PRN MC SEE 

COMMENTS;  Start 4/7/20 at 10:45;  Status UNV


Info (PHARMACY MONITORING -- do not chart) 1 each PRN DAILY  PRN MC SEE 

COMMENTS;  Start 4/7/20 at 10:45;  Status UNV


Sodium Chloride 90 meq/Potassium Chloride 15 meq/ Magnesium Sulfate 12 

meq/Calcium Gluconate 15 meq/ Multivitamins 10 ml/Chromium/ Copper/Manganese/ 

Seleni/Zn 0.5 ml/ Insulin Human Regular 25 unit/ Total Parenteral 

Nutrition/Amino Acids/Dextrose/ Fat Emulsion Intravenous 1,400 ml @  58.333 mls/

hr TPN  CONT IV  Last administered on 4/7/20at 22:13;  Start 4/7/20 at 22:00;  

Stop 4/8/20 at 21:59;  Status DC


Sodium Chloride 1,000 ml @  1,000 mls/hr Q1H PRN IV hypotension;  Start 4/8/20 

at 07:50;  Stop 4/8/20 at 13:49;  Status DC


Albumin Human 200 ml @  200 mls/hr 1X  ONCE IV ;  Start 4/8/20 at 08:00;  Stop 

4/8/20 at 08:53;  Status DC


Diphenhydramine HCl (Benadryl) 25 mg 1X PRN  PRN IV ITCHING;  Start 4/8/20 at 

08:00;  Stop 4/9/20 at 07:59;  Status DC


Diphenhydramine HCl (Benadryl) 25 mg 1X PRN  PRN IV ITCHING;  Start 4/8/20 at 

08:00;  Stop 4/9/20 at 07:59;  Status DC


Info (PHARMACY MONITORING -- do not chart) 1 each PRN DAILY  PRN MC SEE 

COMMENTS;  Start 4/8/20 at 08:00;  Stop 4/9/20 at 08:16;  Status DC


Albumin Human 50 ml @ 50 mls/hr 1X  ONCE IV ;  Start 4/8/20 at 08:53;  Stop 

4/8/20 at 08:56;  Status DC


Albumin Human 200 ml @  50 mls/hr PRN 1X  PRN IV HYPOTENSION Last administered 

on 4/14/20at 11:54;  Start 4/8/20 at 09:00;  Stop 5/21/20 at 11:14;  Status DC


Meropenem 500 mg/ Sodium Chloride 50 ml @  100 mls/hr Q12H IV  Last administered

on 4/28/20at 10:45;  Start 4/8/20 at 10:00;  Stop 4/28/20 at 12:37;  Status DC


Sodium Chloride 90 meq/Magnesium Sulfate 12 meq/ Calcium Gluconate 15 meq/ 

Multivitamins 10 ml/Chromium/ Copper/Manganese/ Seleni/Zn 0.5 ml/ Insulin Human 

Regular 25 unit/ Total Parenteral Nutrition/Amino Acids/Dextrose/ Fat Emulsion 

Intravenous 1,400 ml @  58.333 mls/ hr TPN  CONT IV  Last administered on 

4/8/20at 21:41;  Start 4/8/20 at 22:00;  Stop 4/9/20 at 21:59;  Status DC


Sodium Chloride 1,000 ml @  1,000 mls/hr Q1H PRN IV hypotension;  Start 4/9/20 

at 07:58;  Stop 4/9/20 at 13:57;  Status DC


Albumin Human 200 ml @  200 mls/hr 1X PRN  PRN IV Hypotension Last administered 

on 4/9/20at 09:30;  Start 4/9/20 at 08:00;  Stop 4/9/20 at 13:59;  Status DC


Sodium Chloride 1,000 ml @  400 mls/hr Q2H30M PRN IV PATENCY;  Start 4/9/20 at 

07:58;  Stop 4/9/20 at 19:57;  Status DC


Info (PHARMACY MONITORING -- do not chart) 1 each PRN DAILY  PRN MC SEE 

COMMENTS;  Start 4/9/20 at 08:00;  Status Cancel


Info (PHARMACY MONITORING -- do not chart) 1 each PRN DAILY  PRN MC SEE 

COMMENTS;  Start 4/9/20 at 08:15;  Status UNV


Sodium Chloride 90 meq/Potassium Phosphate 5 mmol/ Magnesium Sulfate 12 

meq/Calcium Gluconate 15 meq/ Multivitamins 10 ml/Chromium/ Copper/Manganese/ 

Seleni/Zn 0.5 ml/ Insulin Human Regular 30 unit/ Total Parenteral 

Nutrition/Amino Acids/Dextrose/ Fat Emulsion Intravenous 1,400 ml @  58.333 mls/

hr TPN  CONT IV  Last administered on 4/9/20at 22:08;  Start 4/9/20 at 22:00;  

Stop 4/10/20 at 21:59;  Status DC


Linezolid/Dextrose 300 ml @  300 mls/hr Q12HR IV  Last administered on 4/20/20at

20:40;  Start 4/10/20 at 11:00;  Stop 4/21/20 at 08:10;  Status DC


Sodium Chloride 90 meq/Potassium Phosphate 15 mmol/ Magnesium Sulfate 12 

meq/Calcium Gluconate 15 meq/ Multivitamins 10 ml/Chromium/ Copper/Manganese/ 

Seleni/Zn 0.5 ml/ Insulin Human Regular 30 unit/ Total Parenteral 

Nutrition/Amino Acids/Dextrose/ Fat Emulsion Intravenous 1,400 ml @  58.333 mls/

hr TPN  CONT IV  Last administered on 4/10/20at 21:49;  Start 4/10/20 at 22:00; 

Stop 4/11/20 at 21:59;  Status DC


Sodium Chloride 90 meq/Potassium Phosphate 15 mmol/ Magnesium Sulfate 12 meq/C

alcium Gluconate 15 meq/ Multivitamins 10 ml/Chromium/ Copper/Manganese/ 

Seleni/Zn 0.5 ml/ Insulin Human Regular 40 unit/ Total Parenteral 

Nutrition/Amino Acids/Dextrose/ Fat Emulsion Intravenous 1,400 ml @  58.333 mls/

hr TPN  CONT IV  Last administered on 4/11/20at 21:21;  Start 4/11/20 at 22:00; 

Stop 4/12/20 at 21:59;  Status DC


Sodium Chloride 1,000 ml @  1,000 mls/hr Q1H PRN IV hypotension;  Start 4/11/20 

at 13:26;  Stop 4/11/20 at 19:25;  Status DC


Albumin Human 200 ml @  200 mls/hr 1X PRN  PRN IV Hypotension Last administered 

on 4/11/20at 15:00;  Start 4/11/20 at 13:30;  Stop 4/11/20 at 19:29;  Status DC


Sodium Chloride (Normal Saline Flush) 10 ml 1X PRN  PRN IV AP catheter pack;  

Start 4/11/20 at 13:30;  Stop 4/12/20 at 13:29;  Status DC


Sodium Chloride (Normal Saline Flush) 10 ml 1X PRN  PRN IV  catheter pack;  

Start 4/11/20 at 13:30;  Stop 4/12/20 at 13:29;  Status DC


Sodium Chloride 1,000 ml @  400 mls/hr Q2H30M PRN IV PATENCY;  Start 4/11/20 at 

13:26;  Stop 4/12/20 at 01:25;  Status DC


Info (PHARMACY MONITORING -- do not chart) 1 each PRN DAILY  PRN MC SEE 

COMMENTS;  Start 4/11/20 at 13:30;  Stop 4/11/20 at 13:33;  Status DC


Info (PHARMACY MONITORING -- do not chart) 1 each PRN DAILY  PRN MC SEE 

COMMENTS;  Start 4/11/20 at 13:30;  Stop 4/11/20 at 13:34;  Status DC


Sodium Chloride 90 meq/Potassium Phosphate 19 mmol/ Magnesium Sulfate 12 

meq/Calcium Gluconate 15 meq/ Multivitamins 10 ml/Chromium/ Copper/Manganese/ 

Seleni/Zn 0.5 ml/ Insulin Human Regular 40 unit/ Total Parenteral 

Nutrition/Amino Acids/Dextrose/ Fat Emulsion Intravenous 1,400 ml @  58.333 mls/

hr TPN  CONT IV  Last administered on 4/12/20at 21:54;  Start 4/12/20 at 22:00; 

Stop 4/13/20 at 21:59;  Status DC


Sodium Chloride 1,000 ml @  1,000 mls/hr Q1H PRN IV hypotension;  Start 4/13/20 

at 09:35;  Stop 4/13/20 at 15:34;  Status DC


Albumin Human 200 ml @  200 mls/hr 1X PRN  PRN IV Hypotension;  Start 4/13/20 at

09:45;  Stop 4/13/20 at 15:44;  Status DC


Diphenhydramine HCl (Benadryl) 25 mg 1X PRN  PRN IV ITCHING;  Start 4/13/20 at 

09:45;  Stop 4/14/20 at 09:44;  Status DC


Diphenhydramine HCl (Benadryl) 25 mg 1X PRN  PRN IV ITCHING;  Start 4/13/20 at 

09:45;  Stop 4/14/20 at 09:44;  Status DC


Sodium Chloride 1,000 ml @  400 mls/hr Q2H30M PRN IV PATENCY;  Start 4/13/20 at 

09:35;  Stop 4/13/20 at 21:34;  Status DC


Info (PHARMACY MONITORING -- do not chart) 1 each PRN DAILY  PRN MC SEE 

COMMENTS;  Start 4/13/20 at 09:45;  Status Cancel


Sodium Chloride 100 meq/Potassium Phosphate 19 mmol/ Magnesium Sulfate 12 

meq/Calcium Gluconate 15 meq/ Multivitamins 10 ml/Chromium/ Copper/Manganese/ 

Seleni/Zn 0.5 ml/ Insulin Human Regular 40 unit/ Potassium Chloride 20 meq/ 

Total Parenteral Nutrition/Amino Acids/Dextrose/ Fat Emulsion Intravenous 1,400 

ml @  58.333 mls/ hr TPN  CONT IV  Last administered on 4/13/20at 22:02;  Start 

4/13/20 at 22:00;  Stop 4/14/20 at 21:59;  Status DC


Furosemide (Lasix) 40 mg 1X  ONCE IVP  Last administered on 4/13/20at 14:39;  

Start 4/13/20 at 14:30;  Stop 4/13/20 at 14:31;  Status DC


Metronidazole 100 ml @  100 mls/hr Q8HRS IV  Last administered on 4/21/20at 

06:04;  Start 4/14/20 at 10:00;  Stop 4/21/20 at 08:10;  Status DC


Sodium Chloride 1,000 ml @  1,000 mls/hr Q1H PRN IV hypotension;  Start 4/14/20 

at 08:00;  Stop 4/14/20 at 13:59;  Status DC


Albumin Human 200 ml @  200 mls/hr 1X PRN  PRN IV Hypotension;  Start 4/14/20 at

08:00;  Stop 4/14/20 at 13:59;  Status DC


Sodium Chloride 1,000 ml @  400 mls/hr Q2H30M PRN IV PATENCY;  Start 4/14/20 at 

08:00;  Stop 4/14/20 at 19:59;  Status DC


Info (PHARMACY MONITORING -- do not chart) 1 each PRN DAILY  PRN MC SEE 

COMMENTS;  Start 4/14/20 at 11:30;  Status UNV


Info (PHARMACY MONITORING -- do not chart) 1 each PRN DAILY  PRN MC SEE 

COMMENTS;  Start 4/14/20 at 11:30;  Stop 4/16/20 at 12:13;  Status DC


Sodium Chloride 100 meq/Potassium Phosphate 19 mmol/ Magnesium Sulfate 12 

meq/Calcium Gluconate 15 meq/ Multivitamins 10 ml/Chromium/ Copper/Manganese/ 

Seleni/Zn 0.5 ml/ Insulin Human Regular 40 unit/ Potassium Chloride 20 meq/ 

Total Parenteral Nutrition/Amino Acids/Dextrose/ Fat Emulsion Intravenous 1,400 

ml @  58.333 mls/ hr TPN  CONT IV  Last administered on 4/14/20at 21:52;  Start 

4/14/20 at 22:00;  Stop 4/15/20 at 21:59;  Status DC


Sodium Chloride (Normal Saline Flush) 10 ml QSHIFT  PRN IV AFTER MEDS AND BLOOD 

DRAWS;  Start 4/14/20 at 15:00;  Stop 5/12/20 at 11:27;  Status DC


Sodium Chloride (Normal Saline Flush) 10 ml PRN Q5MIN  PRN IV AFTER MEDS AND 

BLOOD DRAWS;  Start 4/14/20 at 15:00


Sodium Chloride (Normal Saline Flush) 20 ml PRN Q5MIN  PRN IV AFTER MEDS AND 

BLOOD DRAWS;  Start 4/14/20 at 15:00


Sodium Chloride 100 meq/Potassium Phosphate 19 mmol/ Magnesium Sulfate 12 

meq/Calcium Gluconate 15 meq/ Multivitamins 10 ml/Chromium/ Copper/Manganese/ 

Seleni/Zn 0.5 ml/ Insulin Human Regular 40 unit/ Potassium Chloride 20 meq/ 

Total Parenteral Nutrition/Amino Acids/Dextrose/ Fat Emulsion Intravenous 1,400 

ml @  58.333 mls/ hr TPN  CONT IV  Last administered on 4/15/20at 21:20;  Start 

4/15/20 at 22:00;  Stop 4/16/20 at 21:59;  Status DC


Lidocaine HCl (Buffered Lidocaine 1%) 3 ml STK-MED ONCE .ROUTE ;  Start 4/15/20 

at 13:16;  Stop 4/15/20 at 13:16;  Status DC


Lidocaine HCl (Buffered Lidocaine 1%) 6 ml 1X  ONCE INJ  Last administered on 

4/15/20at 13:45;  Start 4/15/20 at 13:30;  Stop 4/15/20 at 13:31;  Status DC


Albumin Human 100 ml @  100 mls/hr 1X  ONCE IV  Last administered on 4/15/20at 

15:41;  Start 4/15/20 at 15:00;  Stop 4/15/20 at 15:59;  Status DC


Albumin Human 50 ml @ 50 mls/hr 1X  ONCE IV  Last administered on 4/15/20at 

15:00;  Start 4/15/20 at 15:00;  Stop 4/15/20 at 15:59;  Status DC


Info (PHARMACY MONITORING -- do not chart) 1 each PRN DAILY  PRN MC SEE 

COMMENTS;  Start 4/16/20 at 11:30;  Status Cancel


Info (PHARMACY MONITORING -- do not chart) 1 each PRN DAILY  PRN MC SEE 

COMMENTS;  Start 4/16/20 at 11:30;  Status UNV


Sodium Chloride 100 meq/Potassium Phosphate 10 mmol/ Magnesium Sulfate 12 

meq/Calcium Gluconate 15 meq/ Multivitamins 10 ml/Chromium/ Copper/Manganese/ 

Seleni/Zn 0.5 ml/ Insulin Human Regular 35 unit/ Potassium Chloride 20 meq/ 

Total Parenteral Nutrition/Amino Acids/Dextrose/ Fat Emulsion Intravenous 1,400 

ml @  58.333 mls/ hr TPN  CONT IV  Last administered on 4/16/20at 22:10;  Start 

4/16/20 at 22:00;  Stop 4/17/20 at 21:59;  Status DC


Sodium Chloride 100 meq/Potassium Phosphate 5 mmol/ Magnesium Sulfate 12 

meq/Calcium Gluconate 15 meq/ Multivitamins 10 ml/Chromium/ Copper/Manganese/ 

Seleni/Zn 0.5 ml/ Insulin Human Regular 35 unit/ Potassium Chloride 20 meq/ 

Total Parenteral Nutrition/Amino Acids/Dextrose/ Fat Emulsion Intravenous 1,400 

ml @  58.333 mls/ hr TPN  CONT IV  Last administered on 4/17/20at 22:59;  Start 

4/17/20 at 22:00;  Stop 4/18/20 at 21:59;  Status DC


Sodium Chloride 1,000 ml @  1,000 mls/hr Q1H PRN IV hypotension;  Start 4/18/20 

at 08:27;  Stop 4/18/20 at 14:26;  Status DC


Albumin Human 200 ml @  200 mls/hr 1X PRN  PRN IV Hypotension Last administered 

on 4/18/20at 09:18;  Start 4/18/20 at 08:30;  Stop 4/18/20 at 14:29;  Status DC


Sodium Chloride 1,000 ml @  400 mls/hr Q2H30M PRN IV PATENCY;  Start 4/18/20 at 

08:27;  Stop 4/18/20 at 20:26;  Status DC


Info (PHARMACY MONITORING -- do not chart) 1 each PRN DAILY  PRN MC SEE 

COMMENTS;  Start 4/18/20 at 08:30;  Status Cancel


Info (PHARMACY MONITORING -- do not chart) 1 each PRN DAILY  PRN MC SEE 

COMMENTS;  Start 4/18/20 at 08:30;  Stop 4/26/20 at 13:10;  Status DC


Sodium Chloride 100 meq/Potassium Chloride 40 meq/ Magnesium Sulfate 15 

meq/Calcium Gluconate 15 meq/ Multivitamins 10 ml/Chromium/ Copper/Manganese/ 

Seleni/Zn 0.5 ml/ Insulin Human Regular 35 unit/ Total Parenteral 

Nutrition/Amino Acids/Dextrose/ Fat Emulsion Intravenous 1,400 ml @  58.333 mls/

hr TPN  CONT IV  Last administered on 4/18/20at 22:00;  Start 4/18/20 at 22:00; 

Stop 4/19/20 at 21:59;  Status DC


Potassium Chloride/Water 100 ml @  100 mls/hr 1X  ONCE IV  Last administered on 

4/18/20at 17:28;  Start 4/18/20 at 14:45;  Stop 4/18/20 at 15:44;  Status DC


Sodium Chloride 100 meq/Potassium Chloride 40 meq/ Magnesium Sulfate 15 meq/C

alcium Gluconate 15 meq/ Multivitamins 10 ml/Chromium/ Copper/Manganese/ 

Seleni/Zn 0.5 ml/ Insulin Human Regular 35 unit/ Total Parenteral 

Nutrition/Amino Acids/Dextrose/ Fat Emulsion Intravenous 1,400 ml @  58.333 mls/

hr TPN  CONT IV  Last administered on 4/19/20at 22:46;  Start 4/19/20 at 22:00; 

Stop 4/20/20 at 21:59;  Status DC


Sodium Chloride 100 meq/Potassium Chloride 40 meq/ Magnesium Sulfate 20 

meq/Calcium Gluconate 15 meq/ Multivitamins 10 ml/Chromium/ Copper/Manganese/ 

Seleni/Zn 0.5 ml/ Insulin Human Regular 35 unit/ Total Parenteral 

Nutrition/Amino Acids/Dextrose/ Fat Emulsion Intravenous 1,400 ml @  58.333 mls/

hr TPN  CONT IV  Last administered on 4/20/20at 22:31;  Start 4/20/20 at 22:00; 

Stop 4/21/20 at 21:59;  Status DC


Fentanyl Citrate (Fentanyl 2ml Vial) 50 mcg PRN Q2HR  PRN IVP PAIN Last 

administered on 4/27/20at 13:32;  Start 4/20/20 at 21:00;  Stop 4/28/20 at 12

:53;  Status DC


Fentanyl Citrate (Fentanyl 2ml Vial) 25 mcg PRN Q2HR  PRN IVP PAIN;  Start 

4/20/20 at 21:00;  Stop 4/28/20 at 12:54;  Status DC


Enoxaparin Sodium (Lovenox 100mg Syringe) 100 mg Q12HR SQ ;  Start 4/21/20 at 

21:00;  Status UNV


Amino Acids/ Glycerin/ Electrolytes 1,000 ml @  75 mls/hr [id number] IV ;  Start 

4/20/20 at 21:15;  Status UNV


Sodium Chloride 1,000 ml @  1,000 mls/hr Q1H PRN IV hypotension;  Start 4/21/20 

at 07:56;  Stop 4/21/20 at 13:55;  Status DC


Albumin Human 200 ml @  200 mls/hr 1X PRN  PRN IV Hypotension Last administered 

on 4/21/20at 08:40;  Start 4/21/20 at 08:00;  Stop 4/21/20 at 13:59;  Status DC


Sodium Chloride 1,000 ml @  400 mls/hr Q2H30M PRN IV PATENCY;  Start 4/21/20 at 

07:56;  Stop 4/21/20 at 19:55;  Status DC


Info (PHARMACY MONITORING -- do not chart) 1 each PRN DAILY  PRN MC SEE 

COMMENTS;  Start 4/21/20 at 08:00;  Status UNV


Info (PHARMACY MONITORING -- do not chart) 1 each PRN DAILY  PRN MC SEE 

COMMENTS;  Start 4/21/20 at 08:00;  Status UNV


Daptomycin 430 mg/ Sodium Chloride 50 ml @  100 mls/hr Q24H IV  Last 

administered on 4/21/20at 12:35;  Start 4/21/20 at 09:00;  Stop 4/21/20 at 

12:49;  Status DC


Sodium Chloride 100 meq/Potassium Chloride 40 meq/ Magnesium Sulfate 20 

meq/Calcium Gluconate 15 meq/ Multivitamins 10 ml/Chromium/ Copper/Manganese/ 

Seleni/Zn 0.5 ml/ Insulin Human Regular 35 unit/ Total Parenteral 

Nutrition/Amino Acids/Dextrose/ Fat Emulsion Intravenous 1,400 ml @  58.333 mls/

hr TPN  CONT IV  Last administered on 4/21/20at 21:26;  Start 4/21/20 at 22:00; 

Stop 4/22/20 at 21:59;  Status DC


Daptomycin 430 mg/ Sodium Chloride 50 ml @  100 mls/hr Q48H IV ;  Start 4/23/20 

at 09:00;  Stop 4/22/20 at 11:55;  Status DC


Sodium Chloride 100 meq/Potassium Chloride 40 meq/ Magnesium Sulfate 20 

meq/Calcium Gluconate 15 meq/ Multivitamins 10 ml/Chromium/ Copper/Manganese/ 

Seleni/Zn 0.5 ml/ Insulin Human Regular 35 unit/ Total Parenteral Nutrition/A

rajesh Acids/Dextrose/ Fat Emulsion Intravenous 1,400 ml @  58.333 mls/ hr TPN  

CONT IV  Last administered on 4/22/20at 22:27;  Start 4/22/20 at 22:00;  Stop 

4/23/20 at 21:59;  Status DC


Daptomycin 430 mg/ Sodium Chloride 50 ml @  100 mls/hr Q24H IV  Last ad

ministered on 4/24/20at 15:07;  Start 4/22/20 at 13:00;  Stop 4/25/20 at 13:15; 

Status DC


Sodium Chloride 100 meq/Potassium Chloride 40 meq/ Magnesium Sulfate 20 

meq/Calcium Gluconate 10 meq/ Multivitamins 10 ml/Chromium/ Copper/Manganese/ 

Seleni/Zn 0.5 ml/ Insulin Human Regular 35 unit/ Total Parenteral 

Nutrition/Amino Acids/Dextrose/ Fat Emulsion Intravenous 1,400 ml @  58.333 mls/

hr TPN  CONT IV  Last administered on 4/24/20at 00:06;  Start 4/23/20 at 22:00; 

Stop 4/24/20 at 21:59;  Status DC


Alteplase, Recombinant (Cathflo For Central Catheter Clearance) 1 mg 1X  ONCE 

INT CAT  Last administered on 4/24/20at 11:44;  Start 4/24/20 at 10:45;  Stop 

4/24/20 at 10:46;  Status DC


Ondansetron HCl (Zofran) 4 mg PRN Q6HRS  PRN IV NAUSEA/VOMITING;  Start 4/27/20 

at 07:00;  Stop 4/28/20 at 06:59;  Status DC


Fentanyl Citrate (Fentanyl 2ml Vial) 25 mcg PRN Q5MIN  PRN IV MILD PAIN 1-3;  

Start 4/27/20 at 07:00;  Stop 4/28/20 at 06:59;  Status DC


Fentanyl Citrate (Fentanyl 2ml Vial) 50 mcg PRN Q5MIN  PRN IV MODERATE TO SEVERE

PAIN Last administered on 4/27/20at 10:17;  Start 4/27/20 at 07:00;  Stop 

4/28/20 at 06:59;  Status DC


Ringer's Solution 1,000 ml @  30 mls/hr Q24H IV ;  Start 4/27/20 at 07:00;  Stop

4/27/20 at 18:59;  Status DC


Lidocaine HCl (Xylocaine-Mpf 1% 2ml Vial) 2 ml PRN 1X  PRN ID PRIOR TO IV START;

 Start 4/27/20 at 07:00;  Stop 4/28/20 at 06:59;  Status DC


Prochlorperazine Edisylate (Compazine) 5 mg PACU PRN  PRN IV NAUSEA, MRX1;  

Start 4/27/20 at 07:00;  Stop 4/28/20 at 06:59;  Status DC


Sodium Acetate 50 meq/Potassium Acetate 55 meq/ Magnesium Sulfate 20 meq/Calcium

Gluconate 10 meq/ Multivitamins 10 ml/Chromium/ Copper/Manganese/ Seleni/Zn 0.5 

ml/ Insulin Human Regular 35 unit/ Total Parenteral Nutrition/Amino 

Acids/Dextrose/ Fat Emulsion Intravenous 1,400 ml @  58.333 mls/ hr TPN  CONT IV

;  Start 4/24/20 at 22:00;  Stop 4/24/20 at 14:15;  Status DC


Sodium Acetate 50 meq/Potassium Acetate 55 meq/ Magnesium Sulfate 20 meq/Calcium

Gluconate 10 meq/ Multivitamins 10 ml/Chromium/ Copper/Manganese/ Seleni/Zn 0.5 

ml/ Insulin Human Regular 35 unit/ Total Parenteral Nutrition/Amino 

Acids/Dextrose/ Fat Emulsion Intravenous 1,800 ml @  75 mls/hr TPN  CONT IV  

Last administered on 4/24/20at 22:38;  Start 4/24/20 at 22:00;  Stop 4/25/20 at 

21:59;  Status DC


Sodium Chloride 1,000 ml @  1,000 mls/hr Q1H PRN IV hypotension;  Start 4/24/20 

at 15:31;  Stop 4/24/20 at 21:30;  Status DC


Diphenhydramine HCl (Benadryl) 25 mg 1X PRN  PRN IV ITCHING;  Start 4/24/20 at 

15:45;  Stop 4/25/20 at 15:44;  Status DC


Diphenhydramine HCl (Benadryl) 25 mg 1X PRN  PRN IV ITCHING;  Start 4/24/20 at 

15:45;  Stop 4/25/20 at 15:44;  Status DC


Sodium Chloride 1,000 ml @  400 mls/hr Q2H30M PRN IV PATENCY;  Start 4/24/20 at 

15:31;  Stop 4/25/20 at 03:30;  Status DC


Info (PHARMACY MONITORING -- do not chart) 1 each PRN DAILY  PRN MC SEE 

COMMENTS;  Start 4/24/20 at 15:45;  Stop 5/26/20 at 14:14;  Status DC


Sodium Acetate 50 meq/Potassium Acetate 55 meq/ Magnesium Sulfate 20 meq/Calcium

Gluconate 10 meq/ Multivitamins 10 ml/Chromium/ Copper/Manganese/ Seleni/Zn 0.5 

ml/ Insulin Human Regular 35 unit/ Total Parenteral Nutrition/Amino 

Acids/Dextrose/ Fat Emulsion Intravenous 1,800 ml @  75 mls/hr TPN  CONT IV  

Last administered on 4/25/20at 22:03;  Start 4/25/20 at 22:00;  Stop 4/26/20 at 

21:59;  Status DC


Daptomycin 430 mg/ Sodium Chloride 50 ml @  100 mls/hr Q24H IV  Last 

administered on 4/30/20at 13:00;  Start 4/25/20 at 13:00;  Stop 4/30/20 at 

20:58;  Status DC


Heparin Sodium (Porcine) 1000 unit/Sodium Chloride 1,001 ml @  1,001 mls/hr 1X  

ONCE IRR ;  Start 4/27/20 at 06:00;  Stop 4/27/20 at 06:59;  Status DC


Potassium Acetate 55 meq/Magnesium Sulfate 20 meq/ Calcium Gluconate 10 meq/ 

Multivitamins 10 ml/Chromium/ Copper/Manganese/ Seleni/Zn 0.5 ml/ Insulin Human 

Regular 35 unit/ Total Parenteral Nutrition/Amino Acids/Dextrose/ Fat Emulsion 

Intravenous 1,920 ml @  80 mls/hr TPN  CONT IV  Last administered on 4/26/20at 

22:10;  Start 4/26/20 at 22:00;  Stop 4/27/20 at 21:59;  Status DC


Dexamethasone Sodium Phosphate (Decadron) 4 mg STK-MED ONCE .ROUTE ;  Start 

4/27/20 at 10:56;  Stop 4/27/20 at 10:57;  Status DC


Ondansetron HCl (Zofran) 4 mg STK-MED ONCE .ROUTE ;  Start 4/27/20 at 10:56;  

Stop 4/27/20 at 10:57;  Status DC


Rocuronium Bromide (Zemuron) 50 mg STK-MED ONCE .ROUTE ;  Start 4/27/20 at 

10:56;  Stop 4/27/20 at 10:57;  Status DC


Fentanyl Citrate (Fentanyl 2ml Vial) 100 mcg STK-MED ONCE .ROUTE ;  Start 

4/27/20 at 10:56;  Stop 4/27/20 at 10:57;  Status DC


Bupivacaine HCl/ Epinephrine Bitart (Sensorcain-Epi 0.5%-1:020291 Mpf) 30 ml 

STK-MED ONCE .ROUTE  Last administered on 4/27/20at 12:01;  Start 4/27/20 at 

10:58;  Stop 4/27/20 at 10:58;  Status DC


Cellulose (Surgicel Hemostat 2x14) 1 each STK-MED ONCE .ROUTE ;  Start 4/27/20 

at 10:58;  Stop 4/27/20 at 10:59;  Status DC


Iohexol (Omnipaque 300 Mg/ml) 50 ml STK-MED ONCE .ROUTE ;  Start 4/27/20 at 

10:58;  Stop 4/27/20 at 10:59;  Status DC


Cellulose (Surgicel Hemostat 4x8) 1 each STK-MED ONCE .ROUTE ;  Start 4/27/20 at

10:58;  Stop 4/27/20 at 10:59;  Status DC


Bisacodyl (Dulcolax Supp) 10 mg STK-MED ONCE .ROUTE ;  Start 4/27/20 at 10:59;  

Stop 4/27/20 at 10:59;  Status DC


Heparin Sodium (Porcine) 1000 unit/Sodium Chloride 1,001 ml @  1,001 mls/hr 1X  

ONCE IRR ;  Start 4/27/20 at 12:00;  Stop 4/27/20 at 12:59;  Status DC


Propofol 20 ml @ As Directed STK-MED ONCE IV ;  Start 4/27/20 at 11:05;  Stop 

4/27/20 at 11:05;  Status DC


Sevoflurane (Ultane) 90 ml STK-MED ONCE IH ;  Start 4/27/20 at 11:05;  Stop 

4/27/20 at 11:05;  Status DC


Sevoflurane (Ultane) 60 ml STK-MED ONCE IH ;  Start 4/27/20 at 12:26;  Stop 

4/27/20 at 12:27;  Status DC


Propofol 20 ml @ As Directed STK-MED ONCE IV ;  Start 4/27/20 at 12:26;  Stop 

4/27/20 at 12:27;  Status DC


Phenylephrine HCl (PHENYLEPHRINE in 0.9% NACL PF) 1 mg STK-MED ONCE IV ;  Start 

4/27/20 at 12:34;  Stop 4/27/20 at 12:34;  Status DC


Heparin Sodium (Porcine) (Heparin Sodium) 5,000 unit Q12HR SQ  Last administered

on 5/6/20at 20:57;  Start 4/27/20 at 21:00;  Stop 5/7/20 at 09:59;  Status DC


Sodium Chloride (Normal Saline Flush) 3 ml QSHIFT  PRN IV AFTER MEDS AND BLOOD 

DRAWS;  Start 4/27/20 at 13:45


Naloxone HCl (Narcan) 0.4 mg PRN Q2MIN  PRN IV SEE INSTRUCTIONS Last 

administered on 6/6/20at 15:15;  Start 4/27/20 at 13:45


Sodium Chloride 1,000 ml @  25 mls/hr Q24H IV  Last administered on 5/26/20at 

13:37;  Start 4/27/20 at 13:37;  Stop 5/29/20 at 13:09;  Status DC


Naloxone HCl (Narcan) 0.4 mg PRN Q2MIN  PRN IV SEE INSTRUCTIONS;  Start 4/27/20 

at 14:30;  Status UNV


Sodium Chloride 1,000 ml @  25 mls/hr Q24H IV ;  Start 4/27/20 at 14:30;  Status

UNV


Hydromorphone HCl 30 ml @ 0 mls/hr CONT PRN  PRN IV PER PROTOCOL Last 

administered on 5/2/20at 16:08;  Start 4/27/20 at 14:30;  Stop 5/4/20 at 08:55; 

Status DC


Potassium Acetate 55 meq/Magnesium Sulfate 20 meq/ Calcium Gluconate 10 meq/ 

Multivitamins 10 ml/Chromium/ Copper/Manganese/ Seleni/Zn 0.5 ml/ Insulin Human 

Regular 35 unit/ Total Parenteral Nutrition/Amino Acids/Dextrose/ Fat Emulsion 

Intravenous 1,920 ml @  80 mls/hr TPN  CONT IV  Last administered on 4/27/20at 

22:01;  Start 4/27/20 at 22:00;  Stop 4/28/20 at 21:59;  Status DC


Bumetanide (Bumex) 2 mg BID92 IV  Last administered on 5/1/20at 13:50;  Start 

4/28/20 at 14:00;  Stop 5/2/20 at 14:10;  Status DC


Meropenem 1 gm/ Sodium Chloride 100 ml @  200 mls/hr Q8HRS IV  Last administered

on 5/22/20at 05:53;  Start 4/28/20 at 14:00;  Stop 5/22/20 at 09:31;  Status DC


Potassium Acetate 55 meq/Magnesium Sulfate 20 meq/ Calcium Gluconate 10 meq/ 

Multivitamins 10 ml/Chromium/ Copper/Manganese/ Seleni/Zn 0.5 ml/ Insulin Human 

Regular 35 unit/ Total Parenteral Nutrition/Amino Acids/Dextrose/ Fat Emulsion 

Intravenous 1,920 ml @  80 mls/hr TPN  CONT IV  Last administered on 4/28/20at 

22:02;  Start 4/28/20 at 22:00;  Stop 4/29/20 at 21:59;  Status DC


Hydromorphone HCl (Dilaudid Standard PCA) 12 mg STK-MED ONCE IV ;  Start 4/27/20

at 14:35;  Stop 4/28/20 at 13:53;  Status DC


Artificial Tears (Artificial Tears) 1 drop PRN Q15MIN  PRN OU DRY EYE Last 

administered on 6/23/20at 21:17;  Start 4/29/20 at 05:30


Hydromorphone HCl (Dilaudid Standard PCA) 12 mg STK-MED ONCE IV ;  Start 4/28/20

at 12:05;  Stop 4/29/20 at 09:15;  Status DC


Potassium Acetate 65 meq/Magnesium Sulfate 20 meq/ Calcium Gluconate 10 meq/ 

Multivitamins 10 ml/Chromium/ Copper/Manganese/ Seleni/Zn 0.5 ml/ Insulin Human 

Regular 30 unit/ Total Parenteral Nutrition/Amino Acids/Dextrose/ Fat Emulsion 

Intravenous 1,920 ml @  80 mls/hr TPN  CONT IV  Last administered on 4/29/20at 

22:22;  Start 4/29/20 at 22:00;  Stop 4/30/20 at 21:59;  Status DC


Cyclobenzaprine HCl (Flexeril) 10 mg PRN Q6HRS  PRN PO MUSCLE SPASMS;  Start 

4/30/20 at 10:45


Potassium Acetate 55 meq/Magnesium Sulfate 20 meq/ Calcium Gluconate 10 meq/ 

Multivitamins 10 ml/Chromium/ Copper/Manganese/ Seleni/Zn 0.5 ml/ Insulin Human 

Regular 30 unit/ Total Parenteral Nutrition/Amino Acids/Dextrose/ Fat Emulsion 

Intravenous 1,920 ml @  80 mls/hr TPN  CONT IV  Last administered on 5/1/20at 

01:00;  Start 4/30/20 at 22:00;  Stop 5/1/20 at 21:59;  Status DC


Magnesium Sulfate 50 ml @ 25 mls/hr 1X  ONCE IV  Last administered on 4/30/20at 

17:18;  Start 4/30/20 at 12:45;  Stop 4/30/20 at 14:44;  Status DC


Potassium Chloride/Water 100 ml @  100 mls/hr 1X  ONCE IV  Last administered on 

5/1/20at 11:27;  Start 5/1/20 at 12:00;  Stop 5/1/20 at 12:59;  Status DC


Hydromorphone HCl (Dilaudid Standard PCA) 12 mg STK-MED ONCE IV ;  Start 4/29/20

at 10:50;  Stop 5/1/20 at 11:02;  Status DC


Hydromorphone HCl (Dilaudid Standard PCA) 12 mg STK-MED ONCE IV ;  Start 4/30/20

at 13:47;  Stop 5/1/20 at 11:03;  Status DC


Potassium Acetate 30 meq/Magnesium Sulfate 20 meq/ Calcium Gluconate 10 meq/ 

Multivitamins 10 ml/Chromium/ Copper/Manganese/ Seleni/Zn 0.5 ml/ Insulin Human 

Regular 30 unit/ Potassium Chloride 30 meq/ Total Parenteral Nutrition/Amino 

Acids/Dextrose/ Fat Emulsion Intravenous 1,920 ml @  80 mls/hr TPN  CONT IV  

Last administered on 5/1/20at 22:34;  Start 5/1/20 at 22:00;  Stop 5/2/20 at 

21:59;  Status DC


Potassium Chloride/Water 100 ml @  100 mls/hr Q1H IV  Last administered on 5/2/ 20at 13:05;  Start 5/2/20 at 07:00;  Stop 5/2/20 at 10:59;  Status DC


Magnesium Sulfate 50 ml @ 25 mls/hr 1X  ONCE IV  Last administered on 5/2/20at 

10:34;  Start 5/2/20 at 10:30;  Stop 5/2/20 at 12:29;  Status DC


Potassium Chloride 75 meq/ Magnesium Sulfate 20 meq/Calcium Gluconate 10 meq/ 

Multivitamins 10 ml/Chromium/ Copper/Manganese/ Seleni/Zn 0.5 ml/ Insulin Human 

Regular 30 unit/ Total Parenteral Nutrition/Amino Acids/Dextrose/ Fat Emulsion 

Intravenous 1,920 ml @  80 mls/hr TPN  CONT IV  Last administered on 5/2/20at 

21:51;  Start 5/2/20 at 22:00;  Stop 5/3/20 at 22:00;  Status DC


Potassium Chloride 75 meq/ Magnesium Sulfate 20 meq/Calcium Gluconate 10 meq/ 

Multivitamins 10 ml/Chromium/ Copper/Manganese/ Seleni/Zn 0.5 ml/ Insulin Human 

Regular 25 unit/ Total Parenteral Nutrition/Amino Acids/Dextrose/ Fat Emulsion 

Intravenous 1,920 ml @  80 mls/hr TPN  CONT IV  Last administered on 5/3/20at 

22:04;  Start 5/3/20 at 22:00;  Stop 5/4/20 at 21:59;  Status DC


Hydromorphone HCl (Dilaudid) 0.4 mg PRN Q4HRS  PRN IVP PAIN Last administered on

5/4/20at 10:57;  Start 5/4/20 at 09:00;  Stop 5/4/20 at 18:59;  Status DC


Micafungin Sodium 100 mg/Dextrose 100 ml @  100 mls/hr Q24H IV  Last 

administered on 5/26/20at 12:17;  Start 5/4/20 at 11:00;  Stop 5/27/20 at 09:59;

 Status DC


Daptomycin 485 mg/ Sodium Chloride 50 ml @  100 mls/hr Q24H IV  Last 

administered on 5/11/20at 13:10;  Start 5/4/20 at 11:00;  Stop 5/12/20 at 07:44;

 Status DC


Potassium Chloride 75 meq/ Magnesium Sulfate 15 meq/Calcium Gluconate 8 meq/ 

Multivitamins 10 ml/Chromium/ Copper/Manganese/ Seleni/Zn 0.5 ml/ Insulin Human 

Regular 25 unit/ Total Parenteral Nutrition/Amino Acids/Dextrose/ Fat Emulsion 

Intravenous 1,920 ml @  80 mls/hr TPN  CONT IV  Last administered on 5/4/20at 

23:08;  Start 5/4/20 at 22:00;  Stop 5/5/20 at 21:59;  Status DC


Haloperidol Lactate (Haldol Inj) 3 mg 1X  ONCE IVP  Last administered on 

5/4/20at 14:37;  Start 5/4/20 at 14:30;  Stop 5/4/20 at 14:31;  Status DC


Hydromorphone HCl (Dilaudid) 1 mg PRN Q4HRS  PRN IVP PAIN Last administered on 

5/18/20at 06:25;  Start 5/4/20 at 19:00;  Stop 5/18/20 at 17:10;  Status DC


Potassium Chloride 75 meq/ Magnesium Sulfate 15 meq/Calcium Gluconate 8 meq/ 

Multivitamins 10 ml/Chromium/ Copper/Manganese/ Seleni/Zn 0.5 ml/ Insulin Human 

Regular 20 unit/ Total Parenteral Nutrition/Amino Acids/Dextrose/ Fat Emulsion 

Intravenous 1,920 ml @  80 mls/hr TPN  CONT IV  Last administered on 5/5/20at 

22:10;  Start 5/5/20 at 22:00;  Stop 5/6/20 at 21:59;  Status DC


Lidocaine HCl (Buffered Lidocaine 1%) 3 ml STK-MED ONCE .ROUTE ;  Start 5/6/20 

at 11:31;  Stop 5/6/20 at 11:31;  Status DC


Lidocaine HCl (Buffered Lidocaine 1%) 3 ml STK-MED ONCE .ROUTE ;  Start 5/6/20 

at 12:28;  Stop 5/6/20 at 12:29;  Status DC


Lidocaine HCl (Buffered Lidocaine 1%) 6 ml 1X  ONCE INJ  Last administered on 

5/6/20at 12:53;  Start 5/6/20 at 12:45;  Stop 5/6/20 at 12:46;  Status DC


Potassium Chloride 75 meq/ Magnesium Sulfate 15 meq/Calcium Gluconate 8 meq/ 

Multivitamins 10 ml/Chromium/ Copper/Manganese/ Seleni/Zn 0.5 ml/ Insulin Human 

Regular 20 unit/ Total Parenteral Nutrition/Amino Acids/Dextrose/ Fat Emulsion 

Intravenous 1,920 ml @  80 mls/hr TPN  CONT IV  Last administered on 5/6/20at 

22:00;  Start 5/6/20 at 22:00;  Stop 5/7/20 at 21:59;  Status DC


Potassium Chloride 75 meq/ Magnesium Sulfate 15 meq/Calcium Gluconate 8 meq/ 

Multivitamins 10 ml/Chromium/ Copper/Manganese/ Seleni/Zn 0.5 ml/ Insulin Human 

Regular 15 unit/ Total Parenteral Nutrition/Amino Acids/Dextrose/ Fat Emulsion 

Intravenous 1,920 ml @  80 mls/hr TPN  CONT IV  Last administered on 5/7/20at 

22:28;  Start 5/7/20 at 22:00;  Stop 5/8/20 at 21:59;  Status DC


Vecuronium Bromide (Norcuron Bolus) 6 mg PRN Q6HRS  PRN IV VENT ASYNCHRONY;  

Start 5/7/20 at 19:15;  Stop 5/7/20 at 19:35;  Status DC


Bumetanide (Bumex) 2 mg 1X  ONCE IV  Last administered on 5/7/20at 22:09;  Start

5/7/20 at 19:45;  Stop 5/7/20 at 19:46;  Status DC


Lidocaine HCl (Buffered Lidocaine 1%) 3 ml STK-MED ONCE .ROUTE ;  Start 5/8/20 

at 07:59;  Stop 5/8/20 at 07:59;  Status DC


Midazolam HCl (Versed) 5 mg STK-MED ONCE .ROUTE ;  Start 5/8/20 at 08:36;  Stop 

5/8/20 at 08:36;  Status DC


Fentanyl Citrate (Fentanyl 5ml Vial) 250 mcg STK-MED ONCE .ROUTE ;  Start 5/8/20

at 08:36;  Stop 5/8/20 at 08:37;  Status DC


Lidocaine HCl (Buffered Lidocaine 1%) 3 ml 1X  ONCE IJ  Last administered on 

5/8/20at 09:30;  Start 5/8/20 at 09:15;  Stop 5/8/20 at 09:16;  Status DC


Midazolam HCl (Versed) 5 mg 1X  ONCE IV  Last administered on 5/8/20at 09:30;  

Start 5/8/20 at 09:15;  Stop 5/8/20 at 09:16;  Status DC


Fentanyl Citrate (Fentanyl 5ml Vial) 250 mcg 1X  ONCE IV  Last administered on 

5/8/20at 09:30;  Start 5/8/20 at 09:15;  Stop 5/8/20 at 09:16;  Status DC


Bumetanide (Bumex) 2 mg DAILY IV  Last administered on 5/18/20at 08:07;  Start 

5/8/20 at 10:00;  Stop 5/18/20 at 17:15;  Status DC


Potassium Chloride 75 meq/ Magnesium Sulfate 15 meq/ Multivitamins 10 

ml/Chromium/ Copper/Manganese/ Seleni/Zn 0.5 ml/ Insulin Human Regular 15 unit/ 

Total Parenteral Nutrition/Amino Acids/Dextrose/ Fat Emulsion Intravenous 1,920 

ml @  80 mls/hr TPN  CONT IV  Last administered on 5/8/20at 21:59;  Start 5/8/20

at 22:00;  Stop 5/9/20 at 21:59;  Status DC


Metoclopramide HCl (Reglan Vial) 10 mg PRN Q3HRS  PRN IVP NAUSEA/VOMITING-3rd 

choice Last administered on 5/14/20at 04:25;  Start 5/9/20 at 16:45


Potassium Chloride 75 meq/ Magnesium Sulfate 15 meq/ Multivitamins 10 

ml/Chromium/ Copper/Manganese/ Seleni/Zn 0.5 ml/ Insulin Human Regular 15 unit/ 

Total Parenteral Nutrition/Amino Acids/Dextrose/ Fat Emulsion Intravenous 1,920 

ml @  80 mls/hr TPN  CONT IV  Last administered on 5/9/20at 22:41;  Start 5/9/20

at 22:00;  Stop 5/10/20 at 21:59;  Status DC


Magnesium Sulfate 50 ml @ 25 mls/hr 1X  ONCE IV  Last administered on 5/10/20at 

10:44;  Start 5/10/20 at 09:00;  Stop 5/10/20 at 10:59;  Status DC


Potassium Chloride/Water 100 ml @  100 mls/hr 1X  ONCE IV  Last administered on 

5/10/20at 09:37;  Start 5/10/20 at 09:00;  Stop 5/10/20 at 09:59;  Status DC


Duloxetine HCl (Cymbalta) 30 mg DAILY PO  Last administered on 5/11/20at 09:48; 

Start 5/10/20 at 14:00;  Stop 5/13/20 at 10:25;  Status DC


Potassium Chloride 80 meq/ Magnesium Sulfate 20 meq/ Multivitamins 10 

ml/Chromium/ Copper/Manganese/ Seleni/Zn 0.5 ml/ Insulin Human Regular 15 unit/ 

Total Parenteral Nutrition/Amino Acids/Dextrose/ Fat Emulsion Intravenous 1,920 

ml @  80 mls/hr TPN  CONT IV  Last administered on 5/10/20at 21:42;  Start 

5/10/20 at 22:00;  Stop 5/11/20 at 21:59;  Status DC


Potassium Chloride 80 meq/ Magnesium Sulfate 20 meq/ Multivitamins 10 

ml/Chromium/ Copper/Manganese/ Seleni/Zn 0.5 ml/ Insulin Human Regular 15 unit/ 

Total Parenteral Nutrition/Amino Acids/Dextrose/ Fat Emulsion Intravenous 1,920 

ml @  80 mls/hr TPN  CONT IV  Last administered on 5/11/20at 22:20;  Start 

5/11/20 at 22:00;  Stop 5/12/20 at 21:59;  Status DC


Lidocaine HCl (Buffered Lidocaine 1%) 3 ml STK-MED ONCE .ROUTE ;  Start 5/12/20 

at 09:54;  Stop 5/12/20 at 09:55;  Status DC


Hydromorphone HCl (Dilaudid Standard PCA) 12 mg STK-MED ONCE IV ;  Start 5/1/20 

at 15:50;  Stop 5/12/20 at 11:24;  Status DC


Potassium Chloride 80 meq/ Magnesium Sulfate 20 meq/ Multivitamins 10 

ml/Chromium/ Copper/Manganese/ Seleni/Zn 0.5 ml/ Insulin Human Regular 15 unit/ 

Total Parenteral Nutrition/Amino Acids/Dextrose/ Fat Emulsion Intravenous 1,920 

ml @  80 mls/hr TPN  CONT IV  Last administered on 5/12/20at 21:40;  Start 

5/12/20 at 22:00;  Stop 5/13/20 at 21:59;  Status DC


Lidocaine HCl (Buffered Lidocaine 1%) 6 ml 1X  ONCE INJ  Last administered on 

5/12/20at 14:15;  Start 5/12/20 at 14:15;  Stop 5/12/20 at 14:16;  Status DC


Potassium Chloride 80 meq/ Magnesium Sulfate 20 meq/ Multivitamins 10 

ml/Chromium/ Copper/Manganese/ Seleni/Zn 1 ml/ Insulin Human Regular 15 unit/ 

Total Parenteral Nutrition/Amino Acids/Dextrose/ Fat Emulsion Intravenous 1,920 

ml @  80 mls/hr TPN  CONT IV  Last administered on 5/13/20at 22:04;  Start 

5/13/20 at 22:00;  Stop 5/14/20 at 21:59;  Status DC


Potassium Chloride/Water 100 ml @  100 mls/hr 1X  ONCE IV  Last administered on 

5/14/20at 11:34;  Start 5/14/20 at 11:00;  Stop 5/14/20 at 11:59;  Status DC


Potassium Chloride 90 meq/ Magnesium Sulfate 20 meq/ Multivitamins 10 

ml/Chromium/ Copper/Manganese/ Seleni/Zn 1 ml/ Insulin Human Regular 15 unit/ 

Total Parenteral Nutrition/Amino Acids/Dextrose/ Fat Emulsion Intravenous 1,920 

ml @  80 mls/hr TPN  CONT IV  Last administered on 5/14/20at 22:57;  Start 

5/14/20 at 22:00;  Stop 5/15/20 at 21:59;  Status DC


Potassium Chloride 90 meq/ Magnesium Sulfate 20 meq/ Multivitamins 10 

ml/Chromium/ Copper/Manganese/ Seleni/Zn 1 ml/ Insulin Human Regular 15 unit/ 

Total Parenteral Nutrition/Amino Acids/Dextrose/ Fat Emulsion Intravenous 1,920 

ml @  80 mls/hr TPN  CONT IV  Last administered on 5/15/20at 22:48;  Start 

5/15/20 at 22:00;  Stop 5/16/20 at 21:59;  Status DC


Potassium Chloride 90 meq/ Magnesium Sulfate 20 meq/ Multivitamins 10 

ml/Chromium/ Copper/Manganese/ Seleni/Zn 1 ml/ Insulin Human Regular 15 unit/ 

Total Parenteral Nutrition/Amino Acids/Dextrose/ Fat Emulsion Intravenous 1,890 

ml @  78.75 mls/ hr TPN  CONT IV  Last administered on 5/16/20at 22:15;  Start 

5/16/20 at 22:00;  Stop 5/17/20 at 21:59;  Status DC


Linezolid/Dextrose 300 ml @  300 mls/hr Q12HR IV  Last administered on 5/19/20at

21:08;  Start 5/17/20 at 09:00;  Stop 5/20/20 at 08:11;  Status DC


Daptomycin 450 mg/ Sodium Chloride 50 ml @  100 mls/hr Q24H IV  Last 

administered on 5/20/20at 09:25;  Start 5/17/20 at 09:00;  Stop 5/21/20 at 

08:30;  Status DC


Potassium Chloride 90 meq/ Magnesium Sulfate 20 meq/ Multivitamins 10 

ml/Chromium/ Copper/Manganese/ Seleni/Zn 1 ml/ Insulin Human Regular 15 unit/ 

Total Parenteral Nutrition/Amino Acids/Dextrose/ Fat Emulsion Intravenous 1,890 

ml @  78.75 mls/ hr TPN  CONT IV  Last administered on 5/17/20at 21:34;  Start 

5/17/20 at 22:00;  Stop 5/18/20 at 21:59;  Status DC


Lorazepam (Ativan Inj) 2 mg STK-MED ONCE .ROUTE ;  Start 5/17/20 at 14:58;  Stop

5/17/20 at 14:58;  Status DC


Metoprolol Tartrate (Lopressor Vial) 5 mg 1X  ONCE IVP  Last administered on 

5/17/20at 15:31;  Start 5/17/20 at 15:15;  Stop 5/17/20 at 15:16;  Status DC


Lorazepam (Ativan Inj) 2 mg 1X  ONCE IVP  Last administered on 5/17/20at 15:30; 

Start 5/17/20 at 15:15;  Stop 5/17/20 at 15:16;  Status DC


Enoxaparin Sodium (Lovenox 40mg Syringe) 40 mg Q24H SQ  Last administered on 

6/5/20at 17:44;  Start 5/17/20 at 17:00;  Stop 6/7/20 at 06:50;  Status DC


Lorazepam (Ativan Inj) 1 mg PRN Q4HRS  PRN IVP ANXIETY / AGITATION MILD-MOD Last

administered on 5/31/20at 15:55;  Start 5/17/20 at 19:15;  Stop 6/2/20 at 11:45;

 Status DC


Lorazepam (Ativan Inj) 2 mg PRN Q4HRS  PRN IVP ANXIETY / AGITATION SEVERE Last 

administered on 6/1/20at 07:55;  Start 5/17/20 at 19:15;  Stop 6/2/20 at 11:45; 

Status DC


Fentanyl Citrate (Fentanyl 2ml Vial) 50 mcg PRN Q4HRS  PRN IVP SEVERE PAIN Last 

administered on 6/13/20at 05:15;  Start 5/18/20 at 13:15;  Stop 6/14/20 at 

09:29;  Status DC


Fentanyl Citrate (Fentanyl 2ml Vial) 25 mcg PRN Q4HRS  PRN IVP MODERATE PAIN 

Last administered on 6/13/20at 00:27;  Start 5/18/20 at 13:15;  Stop 6/14/20 at 

09:30;  Status DC


Potassium Chloride 90 meq/ Magnesium Sulfate 20 meq/ Multivitamins 10 

ml/Chromium/ Copper/Manganese/ Seleni/Zn 1 ml/ Insulin Human Regular 15 unit/ 

Total Parenteral Nutrition/Amino Acids/Dextrose/ Fat Emulsion Intravenous 1,890 

ml @  78.75 mls/ hr TPN  CONT IV  Last administered on 5/18/20at 22:18;  Start 

5/18/20 at 22:00;  Stop 5/19/20 at 21:59;  Status DC


Furosemide (Lasix) 40 mg 1X  ONCE IVP  Last administered on 5/18/20at 21:51;  

Start 5/18/20 at 21:45;  Stop 5/18/20 at 21:48;  Status DC


Albumin Human 100 ml @  100 mls/hr 1X PRN  PRN IV SEE COMMENTS;  Start 5/19/20 

at 01:30


Furosemide (Lasix) 40 mg [id number] IVP  Last administered on 6/3/20at 08:04;  Start 

5/19/20 at 14:00;  Stop 6/3/20 at 13:07;  Status DC


Potassium Chloride 90 meq/ Magnesium Sulfate 20 meq/ Multivitamins 10 

ml/Chromium/ Copper/Manganese/ Seleni/Zn 1 ml/ Insulin Human Regular 15 unit/ 

Total Parenteral Nutrition/Amino Acids/Dextrose/ Fat Emulsion Intravenous 1,800 

ml @  75 mls/hr TPN  CONT IV  Last administered on 5/19/20at 22:31;  Start 

5/19/20 at 22:00;  Stop 5/20/20 at 21:59;  Status DC


Potassium Chloride 90 meq/ Magnesium Sulfate 20 meq/ Multivitamins 10 ml/

Chromium/ Copper/Manganese/ Seleni/Zn 1 ml/ Insulin Human Regular 15 unit/ Total

Parenteral Nutrition/Amino Acids/Dextrose/ Fat Emulsion Intravenous 1,800 ml @  

75 mls/hr TPN  CONT IV  Last administered on 5/20/20at 22:28;  Start 5/20/20 at 

22:00;  Stop 5/21/20 at 21:59;  Status DC


Potassium Chloride 110 meq/ Magnesium Sulfate 20 meq/ Multivitamins 10 

ml/Chromium/ Copper/Manganese/ Seleni/Zn 1 ml/ Insulin Human Regular 15 unit/ 

Total Parenteral Nutrition/Amino Acids/Dextrose/ Fat Emulsion Intravenous 1,800 

ml @  75 mls/hr TPN  CONT IV  Last administered on 5/21/20at 22:01;  Start 

5/21/20 at 22:00;  Stop 5/22/20 at 21:59;  Status DC


Saliva Substitute (Biotene Moisturizing Mouth) 2 spray PRN Q15MIN  PRN PO DRY 

MOUTH;  Start 5/21/20 at 11:00


Potassium Chloride 110 meq/ Magnesium Sulfate 20 meq/ Multivitamins 10 

ml/Chromium/ Copper/Manganese/ Seleni/Zn 1 ml/ Insulin Human Regular 15 unit/ 

Total Parenteral Nutrition/Amino Acids/Dextrose/ Fat Emulsion Intravenous 1,800 

ml @  75 mls/hr TPN  CONT IV  Last administered on 5/22/20at 22:21;  Start 

5/22/20 at 22:00;  Stop 5/23/20 at 21:59;  Status DC


Potassium Chloride 110 meq/ Magnesium Sulfate 20 meq/ Multivitamins 10 

ml/Chromium/ Copper/Manganese/ Seleni/Zn 1 ml/ Insulin Human Regular 15 unit/ 

Total Parenteral Nutrition/Amino Acids/Dextrose/ Fat Emulsion Intravenous 1,800 

ml @  75 mls/hr TPN  CONT IV  Last administered on 5/23/20at 22:04;  Start 

5/23/20 at 22:00;  Stop 5/24/20 at 21:59;  Status DC


Potassium Chloride 110 meq/ Magnesium Sulfate 20 meq/ Multivitamins 10 ml

/Chromium/ Copper/Manganese/ Seleni/Zn 1 ml/ Insulin Human Regular 15 unit/ 

Total Parenteral Nutrition/Amino Acids/Dextrose/ Fat Emulsion Intravenous 1,800 

ml @  75 mls/hr TPN  CONT IV  Last administered on 5/24/20at 22:48;  Start 

5/24/20 at 22:00;  Stop 5/25/20 at 21:59;  Status DC


Potassium Chloride 70 meq/ Magnesium Sulfate 20 meq/ Multivitamins 10 

ml/Chromium/ Copper/Manganese/ Seleni/Zn 1 ml/ Insulin Human Regular 15 unit/ 

Total Parenteral Nutrition/Amino Acids/Dextrose/ Fat Emulsion Intravenous 1,800 

ml @  75 mls/hr TPN  CONT IV  Last administered on 5/25/20at 21:39;  Start 

5/25/20 at 22:00;  Stop 5/26/20 at 21:59;  Status DC


Meropenem 500 mg/ Sodium Chloride 50 ml @  100 mls/hr Q6HRS IV  Last 

administered on 5/27/20at 06:02;  Start 5/25/20 at 18:00;  Stop 5/27/20 at 

09:59;  Status DC


Barium Sulfate (Varibar Thin Liquid Apple) 148 gm 1X  ONCE PO ;  Start 5/26/20 

at 11:45;  Stop 5/26/20 at 11:49;  Status DC


Potassium Chloride 70 meq/ Magnesium Sulfate 20 meq/ Multivitamins 10 

ml/Chromium/ Copper/Manganese/ Seleni/Zn 1 ml/ Insulin Human Regular 15 unit/ 

Total Parenteral Nutrition/Amino Acids/Dextrose/ Fat Emulsion Intravenous 1,800 

ml @  75 mls/hr TPN  CONT IV  Last administered on 5/26/20at 22:27;  Start 

5/26/20 at 22:00;  Stop 5/27/20 at 21:59;  Status DC


Piperacillin Sod/ Tazobactam Sod 3.375 gm/Sodium Chloride 50 ml @  100 mls/hr 

Q6HRS IV  Last administered on 6/4/20at 06:10;  Start 5/27/20 at 12:00;  Stop 

6/4/20 at 07:26;  Status DC


Potassium Chloride 70 meq/ Magnesium Sulfate 20 meq/ Multivitamins 10 

ml/Chromium/ Copper/Manganese/ Seleni/Zn 1 ml/ Insulin Human Regular 15 unit/ 

Total Parenteral Nutrition/Amino Acids/Dextrose/ Fat Emulsion Intravenous 1,800 

ml @  75 mls/hr TPN  CONT IV  Last administered on 5/27/20at 22:03;  Start 

5/27/20 at 22:00;  Stop 5/28/20 at 21:59;  Status DC


Potassium Chloride 70 meq/ Magnesium Sulfate 20 meq/ Multivitamins 10 

ml/Chromium/ Copper/Manganese/ Seleni/Zn 1 ml/ Insulin Human Regular 15 unit/ 

Total Parenteral Nutrition/Amino Acids/Dextrose/ Fat Emulsion Intravenous 1,800 

ml @  75 mls/hr TPN  CONT IV  Last administered on 5/28/20at 22:33;  Start 

5/28/20 at 22:00;  Stop 5/29/20 at 21:59;  Status DC


Potassium Chloride 70 meq/ Magnesium Sulfate 20 meq/ Multivitamins 10 

ml/Chromium/ Copper/Manganese/ Seleni/Zn 1 ml/ Insulin Human Regular 15 unit/ 

Total Parenteral Nutrition/Amino Acids/Dextrose/ Fat Emulsion Intravenous 1,800 

ml @  75 mls/hr TPN  CONT IV  Last administered on 5/29/20at 23:13;  Start 

5/29/20 at 22:00;  Stop 5/30/20 at 21:59;  Status DC


Potassium Chloride 80 meq/ Magnesium Sulfate 20 meq/ Multivitamins 10 

ml/Chromium/ Copper/Manganese/ Seleni/Zn 1 ml/ Insulin Human Regular 15 unit/ 

Total Parenteral Nutrition/Amino Acids/Dextrose/ Fat Emulsion Intravenous 1,800 

ml @  75 mls/hr TPN  CONT IV  Last administered on 5/30/20at 22:30;  Start 

5/30/20 at 22:00;  Stop 5/31/20 at 21:59;  Status DC


Potassium Chloride 80 meq/ Magnesium Sulfate 20 meq/ Multivitamins 10 

ml/Chromium/ Copper/Manganese/ Seleni/Zn 1 ml/ Insulin Human Regular 15 unit/ 

Total Parenteral Nutrition/Amino Acids/Dextrose/ Fat Emulsion Intravenous 1,800 

ml @  75 mls/hr TPN  CONT IV  Last administered on 5/31/20at 21:54;  Start 

5/31/20 at 22:00;  Stop 6/1/20 at 21:59;  Status DC


Potassium Chloride/Water 100 ml @  100 mls/hr 1X  ONCE IV  Last administered on 

6/1/20at 10:15;  Start 6/1/20 at 10:00;  Stop 6/1/20 at 10:59;  Status DC


Potassium Chloride 90 meq/ Magnesium Sulfate 20 meq/ Multivitamins 10 

ml/Chromium/ Copper/Manganese/ Seleni/Zn 1 ml/ Insulin Human Regular 20 unit/ 

Total Parenteral Nutrition/Amino Acids/Dextrose/ Fat Emulsion Intravenous 1,800 

ml @  75 mls/hr TPN  CONT IV  Last administered on 6/1/20at 22:28;  Start 6/1/20

at 22:00;  Stop 6/2/20 at 21:59;  Status DC


Potassium Chloride 90 meq/ Magnesium Sulfate 20 meq/ Multivitamins 10 

ml/Chromium/ Copper/Manganese/ Seleni/Zn 1 ml/ Insulin Human Regular 20 unit/ 

Total Parenteral Nutrition/Amino Acids/Dextrose/ Fat Emulsion Intravenous 1,800 

ml @  75 mls/hr TPN  CONT IV  Last administered on 6/2/20at 22:08;  Start 6/2/20

at 22:00;  Stop 6/3/20 at 21:59;  Status DC


Lorazepam (Ativan Inj) 0.25 mg PRN Q4HRS  PRN IVP ANXIETY / AGITATION Last 

administered on 6/27/20at 13:37;  Start 6/3/20 at 07:30


Potassium Chloride 90 meq/ Magnesium Sulfate 20 meq/ Multivitamins 10 

ml/Chromium/ Copper/Manganese/ Seleni/Zn 1 ml/ Insulin Human Regular 20 unit/ 

Total Parenteral Nutrition/Amino Acids/Dextrose/ Fat Emulsion Intravenous 1,800 

ml @  75 mls/hr TPN  CONT IV  Last administered on 6/3/20at 23:13;  Start 6/3/20

at 22:00;  Stop 6/4/20 at 21:59;  Status DC


Furosemide (Lasix) 40 mg DAILY IVP  Last administered on 6/5/20at 11:14;  Start 

6/3/20 at 13:30;  Stop 6/7/20 at 09:12;  Status DC


Fluoxetine HCl (PROzac) 20 mg QHS PEG  Last administered on 6/27/20at 21:52;  

Start 6/4/20 at 21:00


Fentanyl (Duragesic 50mcg/ Hr Patch) 1 patch Q72H TD  Last administered on 

6/4/20at 21:22;  Start 6/4/20 at 21:00;  Stop 6/13/20 at 12:00;  Status DC


Potassium Chloride 40 meq/ Potassium Acetate 60 meq/Magnesium Sulfate 10 meq/ 

Multivitamins 10 ml/Chromium/ Copper/Manganese/ Seleni/Zn 1 ml/ Insulin Human 

Regular 20 unit/ Total Parenteral Nutrition/Amino Acids/Dextrose/ Fat Emulsion 

Intravenous 1,800 ml @  75 mls/hr TPN  CONT IV  Last administered on 6/5/20at 

00:03;  Start 6/4/20 at 22:00;  Stop 6/5/20 at 21:59;  Status DC


Potassium Acetate 80 meq/Magnesium Sulfate 5 meq/ Multivitamins 10 ml/Chromium/ 

Copper/Manganese/ Seleni/Zn 1 ml/ Insulin Human Regular 20 unit/ Total Paren

teral Nutrition/Amino Acids/Dextrose/ Fat Emulsion Intravenous 1,920 ml @  80 

mls/hr TPN  CONT IV  Last administered on 6/5/20at 21:59;  Start 6/5/20 at 

22:00;  Stop 6/6/20 at 21:59;  Status DC


Potassium Acetate 60 meq/Magnesium Sulfate 5 meq/ Multivitamins 10 ml/Chromium/ 

Copper/Manganese/ Seleni/Zn 1 ml/ Insulin Human Regular 30 unit/ Total 

Parenteral Nutrition/Amino Acids/Dextrose/ Fat Emulsion Intravenous 1,920 ml @  

80 mls/hr TPN  CONT IV  Last administered on 6/6/20at 21:54;  Start 6/6/20 at 

22:00;  Stop 6/7/20 at 21:59;  Status DC


Norepinephrine Bitartrate 8 mg/ Dextrose 258 ml @  13.332 mls/ hr CONT  PRN IV 

PER PROTOCOL Last administered on 6/7/20at 21:46;  Start 6/7/20 at 06:30


Albumin Human 500 ml @  125 mls/hr 1X  ONCE IV  Last administered on 6/7/20at 

08:10;  Start 6/7/20 at 08:15;  Stop 6/7/20 at 12:14;  Status DC


Potassium Acetate 40 meq/Magnesium Sulfate 5 meq/ Multivitamins 10 ml/Chromium/ 

Copper/Manganese/ Seleni/Zn 1 ml/ Insulin Human Regular 30 unit/ Total 

Parenteral Nutrition/Amino Acids/Dextrose/ Fat Emulsion Intravenous 1,920 ml @  

80 mls/hr TPN  CONT IV  Last administered on 6/7/20at 22:23;  Start 6/7/20 at 

22:00;  Stop 6/8/20 at 21:59;  Status DC


Meropenem 1 gm/ Sodium Chloride 100 ml @  200 mls/hr Q8HRS IV ;  Start 6/7/20 at

14:00;  Status Cancel


Meropenem 1 gm/ Sodium Chloride 100 ml @  200 mls/hr Q8HRS IV  Last administered

on 6/7/20at 11:04;  Start 6/7/20 at 10:00;  Stop 6/7/20 at 13:00;  Status DC


Meropenem 1 gm/ Sodium Chloride 100 ml @  200 mls/hr Q12HR IV  Last administered

on 6/25/20at 08:27;  Start 6/7/20 at 21:00;  Stop 6/25/20 at 08:56;  Status DC


Sodium Chloride 1,000 ml @  1,000 mls/hr 1X  ONCE IV  Last administered on 

6/7/20at 11:06;  Start 6/7/20 at 10:45;  Stop 6/7/20 at 11:44;  Status DC


Micafungin Sodium 100 mg/Dextrose 100 ml @  100 mls/hr Q24H IV  Last 

administered on 6/24/20at 12:34;  Start 6/7/20 at 11:00;  Stop 6/25/20 at 08:56;

 Status DC


Daptomycin 410 mg/ Sodium Chloride 50 ml @  100 mls/hr Q24H IV  Last 

administered on 6/9/20at 13:33;  Start 6/7/20 at 14:00;  Stop 6/10/20 at 08:30; 

Status DC


Midazolam HCl (Versed) 2 mg STK-MED ONCE .ROUTE ;  Start 6/7/20 at 14:47;  Stop 

6/7/20 at 14:48;  Status DC


Fentanyl Citrate (Fentanyl 2ml Vial) 100 mcg STK-MED ONCE .ROUTE ;  Start 6/7/20

at 14:47;  Stop 6/7/20 at 14:48;  Status DC


Flumazenil (Romazicon) 0.5 mg STK-MED ONCE IV ;  Start 6/7/20 at 14:48;  Stop 

6/7/20 at 14:48;  Status DC


Naloxone HCl (Narcan) 0.4 mg STK-MED ONCE .ROUTE ;  Start 6/7/20 at 14:48;  Stop

6/7/20 at 14:48;  Status DC


Lidocaine HCl (Lidocaine 1% 20ml Vial) 20 ml STK-MED ONCE .ROUTE ;  Start 6/7/20

at 14:48;  Stop 6/7/20 at 14:48;  Status DC


Midazolam HCl (Versed) 2 mg 1X  ONCE IV  Last administered on 6/7/20at 15:28;  

Start 6/7/20 at 15:00;  Stop 6/7/20 at 15:01;  Status DC


Fentanyl Citrate (Fentanyl 2ml Vial) 100 mcg 1X  ONCE IV  Last administered on 

6/7/20at 15:28;  Start 6/7/20 at 15:00;  Stop 6/7/20 at 15:01;  Status DC


Lidocaine HCl (Lidocaine 1% 20ml Vial) 20 ml 1X  ONCE INJ  Last administered on 

6/7/20at 15:30;  Start 6/7/20 at 15:00;  Stop 6/7/20 at 15:01;  Status DC


Sodium Chloride 1,000 ml @  100 mls/hr Q10H IV  Last administered on 6/16/20at 

07:30;  Start 6/7/20 at 20:00;  Stop 6/16/20 at 11:26;  Status DC


Sodium Bicarbonate (Sodium Bicarb Adult 8.4% Syr) 50 meq 1X  ONCE IV  Last 

administered on 6/7/20at 21:47;  Start 6/7/20 at 22:00;  Stop 6/7/20 at 22:01;  

Status DC


Potassium Acetate 40 meq/Magnesium Sulfate 5 meq/ Multivitamins 10 ml/Chromium/ 

Copper/Manganese/ Seleni/Zn 1 ml/ Insulin Human Regular 30 unit/ Total 

Parenteral Nutrition/Amino Acids/Dextrose/ Fat Emulsion Intravenous 1,920 ml @  

80 mls/hr TPN  CONT IV  Last administered on 6/8/20at 22:28;  Start 6/8/20 at 

22:00;  Stop 6/9/20 at 21:59;  Status DC


Sodium Chloride 500 ml @  500 mls/hr 1X  ONCE IV  Last administered on 6/9/20at 

06:39;  Start 6/9/20 at 06:45;  Stop 6/9/20 at 07:44;  Status DC


Potassium Acetate 40 meq/Magnesium Sulfate 5 meq/ Multivitamins 10 ml/Chromium/ 

Copper/Manganese/ Seleni/Zn 1 ml/ Insulin Human Regular 30 unit/ Total 

Parenteral Nutrition/Amino Acids/Dextrose/ Fat Emulsion Intravenous 1,920 ml @  

80 mls/hr TPN  CONT IV  Last administered on 6/9/20at 22:03;  Start 6/9/20 at 

22:00;  Stop 6/10/20 at 21:59;  Status DC


Metoprolol Tartrate (Lopressor Vial) 5 mg PRN Q6HRS  PRN IVP HYPERTENSION Last 

administered on 6/18/20at 10:14;  Start 6/10/20 at 09:00


Potassium Acetate 40 meq/Magnesium Sulfate 5 meq/ Multivitamins 10 ml/Chromium/ 

Copper/Manganese/ Seleni/Zn 1 ml/ Insulin Human Regular 30 unit/ Total 

Parenteral Nutrition/Amino Acids/Dextrose/ Fat Emulsion Intravenous 1,920 ml @  

80 mls/hr TPN  CONT IV  Last administered on 6/10/20at 21:26;  Start 6/10/20 at 

22:00;  Stop 6/11/20 at 21:59;  Status DC


Potassium Acetate 40 meq/Magnesium Sulfate 5 meq/ Multivitamins 10 ml/Chromium/ 

Copper/Manganese/ Seleni/Zn 1 ml/ Insulin Human Regular 30 unit/ Total 

Parenteral Nutrition/Amino Acids/Dextrose/ Fat Emulsion Intravenous 1,920 ml @  

80 mls/hr TPN  CONT IV  Last administered on 6/11/20at 23:23;  Start 6/11/20 at 

22:00;  Stop 6/12/20 at 21:59;  Status DC


Potassium Acetate 40 meq/Magnesium Sulfate 5 meq/ Multivitamins 10 ml/Chromium/ 

Copper/Manganese/ Seleni/Zn 1 ml/ Insulin Human Regular 30 unit/ Total Par

enteral Nutrition/Amino Acids/Dextrose/ Fat Emulsion Intravenous 1,920 ml @  80 

mls/hr TPN  CONT IV  Last administered on 6/12/20at 21:35;  Start 6/12/20 at 

22:00;  Stop 6/13/20 at 21:59;  Status DC


Furosemide (Lasix) 20 mg 1X  ONCE IVP  Last administered on 6/13/20at 06:26;  

Start 6/13/20 at 06:15;  Stop 6/13/20 at 06:16;  Status DC


Methylprednisolone Sodium Succinate (SOLU-Medrol 125MG VIAL) 125 mg 1X  ONCE IV 

Last administered on 6/13/20at 06:26;  Start 6/13/20 at 06:15;  Stop 6/13/20 at 

06:16;  Status DC


Albuterol/ Ipratropium (Duoneb) 3 ml Q4HRS NEB  Last administered on 6/28/20at 

08:43;  Start 6/13/20 at 08:00


Fentanyl Citrate 30 ml @ 0 mls/hr CONT  PRN IV SEE PROTOCOL Last administered on

6/27/20at 23:26;  Start 6/13/20 at 06:00


Propofol 100 ml @ 0 mls/hr CONT  PRN IV SEE PROTOCOL Last administered on 

6/20/20at 23:50;  Start 6/13/20 at 06:00


Fentanyl Citrate (Fentanyl 2ml Vial) 25 mcg PRN Q1HR  PRN IV SEE COMMENTS;  

Start 6/13/20 at 06:00


Fentanyl Citrate (Fentanyl 2ml Vial) 50 mcg PRN Q1HR  PRN IV SEE COMMENTS;  

Start 6/13/20 at 06:00


Chlorhexidine Gluconate (Peridex) 15 ml BID MM ;  Start 6/13/20 at 09:00;  Stop 

6/13/20 at 07:58;  Status DC


Potassium Acetate 40 meq/Magnesium Sulfate 5 meq/ Multivitamins 10 ml/Chromium/ 

Copper/Manganese/ Seleni/Zn 1 ml/ Insulin Human Regular 30 unit/ Total 

Parenteral Nutrition/Amino Acids/Dextrose/ Fat Emulsion Intravenous 1,920 ml @  

80 mls/hr TPN  CONT IV  Last administered on 6/13/20at 21:19;  Start 6/13/20 at 

22:00;  Stop 6/14/20 at 21:59;  Status DC


Acetylcysteine (Mucomyst 20% Resp Treatment) 600 mg BID NEB  Last administered 

on 6/19/20at 09:33;  Start 6/13/20 at 21:00;  Stop 6/19/20 at 10:39;  Status DC


Magnesium Sulfate 100 ml @  25 mls/hr 1X  ONCE IV  Last administered on 

6/13/20at 15:48;  Start 6/13/20 at 15:45;  Stop 6/13/20 at 19:44;  Status DC


Potassium Acetate 40 meq/Magnesium Sulfate 5 meq/ Multivitamins 10 ml/Chromium/ 

Copper/Manganese/ Seleni/Zn 1 ml/ Insulin Human Regular 30 unit/ Total 

Parenteral Nutrition/Amino Acids/Dextrose/ Fat Emulsion Intravenous 1,920 ml @  

80 mls/hr TPN  CONT IV  Last administered on 6/14/20at 21:35;  Start 6/14/20 at 

22:00;  Stop 6/15/20 at 21:59;  Status DC


Potassium Chloride/Water 100 ml @  100 mls/hr Q1H IV  Last administered on 

6/15/20at 08:31;  Start 6/15/20 at 07:00;  Stop 6/15/20 at 08:59;  Status DC


Potassium Acetate 40 meq/Magnesium Sulfate 5 meq/ Multivitamins 10 ml/Chromium/ 

Copper/Manganese/ Seleni/Zn 1 ml/ Insulin Human Regular 30 unit/ Total 

Parenteral Nutrition/Amino Acids/Dextrose/ Fat Emulsion Intravenous 1,920 ml @  

80 mls/hr TPN  CONT IV  Last administered on 6/15/20at 21:54;  Start 6/15/20 at 

22:00;  Stop 6/16/20 at 19:34;  Status DC


Lidocaine HCl (Buffered Lidocaine 1%) 3 ml STK-MED ONCE .ROUTE ;  Start 6/15/20 

at 12:14;  Stop 6/15/20 at 12:14;  Status DC


Lidocaine HCl (Buffered Lidocaine 1%) 3 ml 1X  ONCE IJ  Last administered on 

6/15/20at 13:11;  Start 6/15/20 at 13:00;  Stop 6/15/20 at 13:01;  Status DC


Magnesium Sulfate 50 ml @ 25 mls/hr 1X  ONCE IV ;  Start 6/16/20 at 08:15;  Stop

6/16/20 at 10:14;  Status DC


Potassium Acetate 40 meq/Magnesium Sulfate 10 meq/ Multivitamins 10 ml/Chromium/

Copper/Manganese/ Seleni/Zn 1 ml/ Insulin Human Regular 20 unit/ Total 

Parenteral Nutrition/Amino Acids/Dextrose/ Fat Emulsion Intravenous 1,920 ml @  

80 mls/hr TPN  CONT IV  Last administered on 6/16/20at 21:32;  Start 6/16/20 at 

22:00;  Stop 6/17/20 at 21:59;  Status DC


Potassium Chloride/Water 100 ml @  100 mls/hr Q1H IV  Last administered on 

6/17/20at 09:12;  Start 6/17/20 at 08:00;  Stop 6/17/20 at 09:59;  Status DC


Alteplase, Recombinant (Cathflo For Central Catheter Clearance) 4 mg 1X  ONCE 

INT CAT ;  Start 6/17/20 at 09:15;  Stop 6/17/20 at 09:16;  Status UNV


Alteplase, Recombinant (Cathflo For Central Catheter Clearance) 4 mg 1X  ONCE 

INT CAT ;  Start 6/17/20 at 09:15;  Stop 6/17/20 at 09:16;  Status UNV


Alteplase, Recombinant (Cathflo For Central Catheter Clearance) 4 mg 1X  ONCE 

INT CAT ;  Start 6/17/20 at 09:15;  Stop 6/17/20 at 09:16;  Status UNV


Alteplase, Recombinant 4 mg/ Sodium Chloride 20 ml @ 20 mls/hr 1X  ONCE IV  Last

administered on 6/17/20at 10:10;  Start 6/17/20 at 10:00;  Stop 6/17/20 at 

10:59;  Status DC


Alteplase, Recombinant 4 mg/ Sodium Chloride 20 ml @ 20 mls/hr 1X  ONCE IV  Last

administered on 6/17/20at 10:09;  Start 6/17/20 at 10:00;  Stop 6/17/20 at 

10:59;  Status DC


Alteplase, Recombinant 4 mg/ Sodium Chloride 20 ml @ 20 mls/hr 1X  ONCE IV  Last

administered on 6/17/20at 10:09;  Start 6/17/20 at 10:00;  Stop 6/17/20 at 

10:59;  Status DC


Potassium Acetate 60 meq/Magnesium Sulfate 10 meq/ Multivitamins 10 ml/Chromium/

Copper/Manganese/ Seleni/Zn 1 ml/ Insulin Human Regular 20 unit/ Total 

Parenteral Nutrition/Amino Acids/Dextrose/ Fat Emulsion Intravenous 1,920 ml @  

80 mls/hr TPN  CONT IV  Last administered on 6/17/20at 21:55;  Start 6/17/20 at 

22:00;  Stop 6/18/20 at 21:59;  Status DC


Albumin Human 500 ml @  125 mls/hr 1X  ONCE IV  Last administered on 6/18/20at 

12:01;  Start 6/18/20 at 11:15;  Stop 6/18/20 at 15:14;  Status DC


Sodium Chloride 500 ml @  500 mls/hr 1X  ONCE IV  Last administered on 6/18/20at

13:50;  Start 6/18/20 at 11:15;  Stop 6/18/20 at 12:14;  Status DC


Potassium Acetate 60 meq/Magnesium Sulfate 14 meq/ Multivitamins 10 ml/Chromium/

Copper/Manganese/ Seleni/Zn 1 ml/ Insulin Human Regular 20 unit/ Total 

Parenteral Nutrition/Amino Acids/Dextrose/ Fat Emulsion Intravenous 1,920 ml @  

80 mls/hr TPN  CONT IV  Last administered on 6/18/20at 22:26;  Start 6/18/20 at 

22:00;  Stop 6/19/20 at 21:59;  Status DC


Ciprofloxacin/ Dextrose 200 ml @  200 mls/hr Q12HR IV  Last administered on 6/ 25/20at 08:27;  Start 6/18/20 at 21:00;  Stop 6/25/20 at 08:56;  Status DC


Albumin Human 250 ml @  62.5 mls/hr 1X  ONCE IV  Last administered on 6/19/20at 

11:09;  Start 6/19/20 at 11:00;  Stop 6/19/20 at 14:59;  Status DC


Furosemide (Lasix) 20 mg 1X  ONCE IVP  Last administered on 6/19/20at 14:52;  

Start 6/19/20 at 10:45;  Stop 6/19/20 at 10:49;  Status DC


Potassium Acetate 60 meq/Magnesium Sulfate 14 meq/ Multivitamins 10 ml/Chromium/

Copper/Manganese/ Seleni/Zn 1 ml/ Insulin Human Regular 15 unit/ Total P

arenteral Nutrition/Amino Acids/Dextrose/ Fat Emulsion Intravenous 1,920 ml @  

80 mls/hr TPN  CONT IV  Last administered on 6/19/20at 22:08;  Start 6/19/20 at 

22:00;  Stop 6/20/20 at 21:59;  Status DC


Potassium Acetate 60 meq/Magnesium Sulfate 14 meq/ Multivitamins 10 ml/Chromium/

Copper/Manganese/ Seleni/Zn 1 ml/ Insulin Human Regular 15 unit/ Total 

Parenteral Nutrition/Amino Acids/Dextrose/ Fat Emulsion Intravenous 1,920 ml @  

80 mls/hr TPN  CONT IV  Last administered on 6/20/20at 22:12;  Start 6/20/20 at 

22:00;  Stop 6/21/20 at 21:59;  Status DC


Potassium Acetate 60 meq/Magnesium Sulfate 14 meq/ Multivitamins 10 ml/Chromium/

Copper/Manganese/ Seleni/Zn 1 ml/ Insulin Human Regular 15 unit/ Total 

Parenteral Nutrition/Amino Acids/Dextrose/ Fat Emulsion Intravenous 1,920 ml @  

80 mls/hr TPN  CONT IV  Last administered on 6/21/20at 22:22;  Start 6/21/20 at 

22:00;  Stop 6/22/20 at 21:59;  Status DC


Furosemide (Lasix) 20 mg 1X  ONCE IVP  Last administered on 6/22/20at 11:07;  

Start 6/22/20 at 10:30;  Stop 6/22/20 at 10:34;  Status DC


Potassium Acetate 60 meq/Magnesium Sulfate 14 meq/ Multivitamins 10 ml/Chromium/

Copper/Manganese/ Seleni/Zn 1 ml/ Insulin Human Regular 15 unit/ Sodium Chloride

20 meq/Total Parenteral Nutrition/Amino Acids/Dextrose/ Fat Emulsion Intravenous

1,920 ml @  80 mls/hr TPN  CONT IV  Last administered on 6/22/20at 21:54;  Start

6/22/20 at 22:00;  Stop 6/23/20 at 21:59;  Status DC


Potassium Acetate 30 meq/Magnesium Sulfate 14 meq/ Multivitamins 10 ml/Chromium/

Copper/Manganese/ Seleni/Zn 1 ml/ Insulin Human Regular 15 unit/ Sodium Chloride

20 meq/Potassium Chloride 30 meq/ Total Parenteral Nutrition/Amino Acids/Dextro

se/ Fat Emulsion Intravenous 1,920 ml @  80 mls/hr TPN  CONT IV  Last 

administered on 6/23/20at 21:46;  Start 6/23/20 at 22:00;  Stop 6/24/20 at 

21:59;  Status DC


Sodium Chloride 80 meq/Potassium Chloride 30 meq/ Potassium Acetate 30 

meq/Magnesium Sulfate 14 meq/ Multivitamins 10 ml/Chromium/ Copper/Manganese/ 

Seleni/Zn 1 ml/ Insulin Human Regular 15 unit/ Total Parenteral Nutrition/Amino 

Acids/Dextrose/ Fat Emulsion Intravenous 1,920 ml @  80 mls/hr TPN  CONT IV  

Last administered on 6/24/20at 22:33;  Start 6/24/20 at 22:00;  Stop 6/25/20 at 

21:59;  Status DC


Furosemide (Lasix) 40 mg 1X  ONCE IVP  Last administered on 6/24/20at 16:27;  

Start 6/24/20 at 15:30;  Stop 6/24/20 at 15:33;  Status DC


Albumin Human 250 ml @  62.5 mls/hr 1X  ONCE IV  Last administered on 6/24/20at 

16:27;  Start 6/24/20 at 15:30;  Stop 6/24/20 at 19:29;  Status DC


Sodium Chloride 80 meq/Potassium Chloride 30 meq/ Potassium Acetate 30 

meq/Magnesium Sulfate 14 meq/ Multivitamins 10 ml/Chromium/ Copper/Manganese/ 

Seleni/Zn 1 ml/ Insulin Human Regular 15 unit/ Total Parenteral Nutrition/Amino 

Acids/Dextrose/ Fat Emulsion Intravenous 1,920 ml @  80 mls/hr TPN  CONT IV  

Last administered on 6/25/20at 22:25;  Start 6/25/20 at 22:00;  Stop 6/26/20 at 

21:59;  Status DC


Sodium Chloride 80 meq/Potassium Chloride 30 meq/ Potassium Acetate 30 

meq/Magnesium Sulfate 14 meq/ Multivitamins 10 ml/Chromium/ Copper/Manganese/ 

Seleni/Zn 1 ml/ Insulin Human Regular 15 unit/ Total Parenteral Nutrition/Amino 

Acids/Dextrose/ Fat Emulsion Intravenous 1,920 ml @  80 mls/hr TPN  CONT IV  

Last administered on 6/26/20at 21:32;  Start 6/26/20 at 22:00;  Stop 6/27/20 at 

21:59;  Status DC


Sodium Chloride 80 meq/Potassium Chloride 30 meq/ Potassium Acetate 30 

meq/Magnesium Sulfate 14 meq/ Multivitamins 10 ml/Chromium/ Copper/Manganese/ 

Seleni/Zn 1 ml/ Insulin Human Regular 15 unit/ Total Parenteral Nutrition/Amino 

Acids/Dextrose/ Fat Emulsion Intravenous 1,920 ml @  80 mls/hr TPN  CONT IV  

Last administered on 6/27/20at 21:53;  Start 6/27/20 at 22:00;  Stop 6/28/20 at 

21:59


Acetylcysteine (Mucomyst 20% Resp Treatment) 600 mg RTBID NEB  Last administered

on 6/28/20at 09:50;  Start 6/27/20 at 12:00


Sodium Chloride 80 meq/Potassium Chloride 30 meq/ Potassium Acetate 30 

meq/Magnesium Sulfate 14 meq/ Multivitamins 10 ml/Chromium/ Copper/Manganese/ 

Seleni/Zn 1 ml/ Insulin Human Regular 15 unit/ Total Parenteral Nutrition/Amino 

Acids/Dextrose/ Fat Emulsion Intravenous 1,920 ml @  80 mls/hr TPN  CONT IV ;  

Start 6/28/20 at 22:00;  Stop 6/29/20 at 21:59





Active Scripts


Active


Reported


Bisoprolol Fumarate 5 Mg Tablet 10 Mg PO DAILY


Vitals/I & O





Vital Sign - Last 24 Hours








 6/27/20 6/27/20 6/27/20 6/27/20





 12:00 12:32 14:00 16:00


 


Temp 100.2   99.0





 100.2   99.0


 


Pulse 130   111


 


Resp 30   28


 


B/P (MAP) 118/84 (95)   110/64 (79)


 


Pulse Ox 94 98  97


 


O2 Delivery Tracheal Collar Tracheal Collar Trach Collar Tracheal Collar


 


O2 Flow Rate  8.0  


 


    





    





 6/27/20 6/27/20 6/27/20 6/27/20





 16:25 19:40 20:00 20:00


 


Temp   97.9 





   97.9 


 


Pulse   101 


 


Resp   26 


 


B/P (MAP)   99/57 (71) 


 


Pulse Ox 96 98 99 


 


O2 Delivery Tracheal Collar Tracheal Collar Tracheal Collar Trach Collar


 


O2 Flow Rate 8.0 8.0  


 


    





    





 6/27/20 6/27/20 6/27/20 6/28/20





 23:16 23:26 23:55 00:00


 


Temp    99.2





    99.2


 


Pulse    111


 


Resp  20 24 22


 


B/P (MAP)    99/75 (83)


 


Pulse Ox 96  99 99


 


O2 Delivery Tracheal Collar  Tracheal Collar Tracheal Collar


 


O2 Flow Rate 8.0   8.0


 


    





    





 6/28/20 6/28/20 6/28/20 6/28/20





 04:00 05:05 07:31 08:00


 


Temp 99.2   99.0





 99.2   99.0


 


Pulse 109   111


 


Resp 20   32


 


B/P (MAP) 112/66 (81)   127/78 (94)


 


Pulse Ox 100 100  100


 


O2 Delivery Tracheal Collar Tracheal Collar Trach Collar Tracheal Collar


 


O2 Flow Rate  8.0  


 


    





    





 6/28/20 6/28/20  





 09:00 10:00  


 


Pulse 110 108  


 


Resp 30 32  


 


B/P (MAP) 123/72 (89) 130/70 (90)  


 


Pulse Ox 100 100  


 


O2 Delivery Tracheal Collar Tracheal Collar  














Intake and Output   


 


 6/27/20 6/27/20 6/28/20





 15:00 23:00 07:00


 


Intake Total  921 ml 1008.9 ml


 


Output Total  900 ml 910 ml


 


Balance  21 ml 98.9 ml











Hemodynamically unstable?:  No


Is patient in severe pain?:  No


Is NPO status required?:  No











HIRAM BARRERA MD             Jun 28, 2020 10:51

## 2020-06-28 NOTE — PDOC
PULMONARY PROGRESS NOTES


Subjective


Sleeping laying in bed, remains on trach shield, chest tube draining yellow 

fluid, nursing reports chest tube this am 30cc/ 24 hours 


Patient intubated on 3/23 , s/p trach 4/6, 


remains on TS, 


episode of AMS and tachycardia yesterday


Vitals





Vital Signs








  Date Time  Temp Pulse Resp B/P (MAP) Pulse Ox O2 Delivery O2 Flow Rate FiO2


 


6/28/20 10:00  108 32 130/70 (90) 100 Tracheal Collar  


 


6/28/20 08:00 99.0       





 99.0       


 


6/28/20 05:05       8.0 








ROS:  No Nausea, No Chest Pain, No Abdominal Pain


General:  Alert, No acute distress


HEENT:  Other (nc at perrl   nose clear    neck  trach site ok   no lab  no 

thyromegaly)


Lungs:  Other (diminshed in bases)


Cardiovascular:  S1, S2


Abdomen:  Soft, Non-tender, Other (multiple ROBERT drains )


Extremities:  Other (+1 BLE edema)


Skin:  Warm, Dry


Labs





Laboratory Tests








Test


 6/26/20


13:10 6/26/20


17:54 6/27/20


00:20 6/27/20


06:07


 


Glucose (Fingerstick)


 134 mg/dL


(70-99) 117 mg/dL


(70-99) 143 mg/dL


(70-99) 134 mg/dL


(70-99)


 


Test


 6/27/20


12:15 6/27/20


17:30 6/28/20


00:30 6/28/20


06:06


 


O2 Saturation 95 % (92-99)    


 


Arterial Blood pH


 7.41


(7.35-7.45) 


 


 





 


Arterial Blood pCO2 at


Patient Temp 54 mmHg


(35-46) 


 


 





 


Arterial Blood pO2 at Patient


Temp 79 mmHg


() 


 


 





 


Arterial Blood HCO3


 34 mmol/L


(21-28) 


 


 





 


Arterial Blood Base Excess


 8 mmol/L


(-3-3) 


 


 





 


FiO2 33    


 


Glucose (Fingerstick)


 


 135 mg/dL


(70-99) 121 mg/dL


(70-99) 128 mg/dL


(70-99)


 


Test


 6/28/20


08:15 6/28/20


08:45 


 





 


White Blood Count


 12.4 x10^3/uL


(4.0-11.0) 


 


 





 


Red Blood Count


 3.06 x10^6/uL


(3.50-5.40) 


 


 





 


Hemoglobin


 8.5 g/dL


(12.0-15.5) 


 


 





 


Hematocrit


 26.0 %


(36.0-47.0) 


 


 





 


Mean Corpuscular Volume 85 fL ()    


 


Mean Corpuscular Hemoglobin 28 pg (25-35)    


 


Mean Corpuscular Hemoglobin


Concent 33 g/dL


(31-37) 


 


 





 


Red Cell Distribution Width


 17.7 %


(11.5-14.5) 


 


 





 


Platelet Count


 394 x10^3/uL


(140-400) 


 


 





 


Neutrophils (%) (Auto) 78 % (31-73)    


 


Lymphocytes (%) (Auto) 12 % (24-48)    


 


Monocytes (%) (Auto) 8 % (0-9)    


 


Eosinophils (%) (Auto) 2 % (0-3)    


 


Basophils (%) (Auto) 0 % (0-3)    


 


Neutrophils # (Auto)


 9.7 x10^3/uL


(1.8-7.7) 


 


 





 


Lymphocytes # (Auto)


 1.4 x10^3/uL


(1.0-4.8) 


 


 





 


Monocytes # (Auto)


 1.0 x10^3/uL


(0.0-1.1) 


 


 





 


Eosinophils # (Auto)


 0.2 x10^3/uL


(0.0-0.7) 


 


 





 


Basophils # (Auto)


 0.0 x10^3/uL


(0.0-0.2) 


 


 





 


Sodium Level


 137 mmol/L


(136-145) 


 


 





 


Potassium Level


 4.5 mmol/L


(3.5-5.1) 


 


 





 


Chloride Level


 101 mmol/L


() 


 


 





 


Carbon Dioxide Level


 35 mmol/L


(21-32) 


 


 





 


Anion Gap 1 (6-14)    


 


Blood Urea Nitrogen


 16 mg/dL


(7-20) 


 


 





 


Creatinine


 0.6 mg/dL


(0.6-1.0) 


 


 





 


Estimated GFR


(Cockcroft-Gault) 106.3 


 


 


 





 


Glucose Level


 129 mg/dL


(70-99) 


 


 





 


Calcium Level


 10.5 mg/dL


(8.5-10.1) 


 


 





 


Magnesium Level


 2.0 mg/dL


(1.8-2.4) 


 


 





 


O2 Saturation  97 % (92-99)   


 


Arterial Blood pH


 


 7.41


(7.35-7.45) 


 





 


Arterial Blood pCO2 at


Patient Temp 


 55 mmHg


(35-46) 


 





 


Arterial Blood pO2 at Patient


Temp 


 108 mmHg


() 


 





 


Arterial Blood HCO3


 


 34 mmol/L


(21-28) 


 





 


Arterial Blood Base Excess


 


 8 mmol/L


(-3-3) 


 





 


FiO2  33   








Laboratory Tests








Test


 6/27/20


12:15 6/27/20


17:30 6/28/20


00:30 6/28/20


06:06


 


O2 Saturation 95 % (92-99)    


 


Arterial Blood pH


 7.41


(7.35-7.45) 


 


 





 


Arterial Blood pCO2 at


Patient Temp 54 mmHg


(35-46) 


 


 





 


Arterial Blood pO2 at Patient


Temp 79 mmHg


() 


 


 





 


Arterial Blood HCO3


 34 mmol/L


(21-28) 


 


 





 


Arterial Blood Base Excess


 8 mmol/L


(-3-3) 


 


 





 


FiO2 33    


 


Glucose (Fingerstick)


 


 135 mg/dL


(70-99) 121 mg/dL


(70-99) 128 mg/dL


(70-99)


 


Test


 6/28/20


08:15 6/28/20


08:45 


 





 


White Blood Count


 12.4 x10^3/uL


(4.0-11.0) 


 


 





 


Red Blood Count


 3.06 x10^6/uL


(3.50-5.40) 


 


 





 


Hemoglobin


 8.5 g/dL


(12.0-15.5) 


 


 





 


Hematocrit


 26.0 %


(36.0-47.0) 


 


 





 


Mean Corpuscular Volume 85 fL ()    


 


Mean Corpuscular Hemoglobin 28 pg (25-35)    


 


Mean Corpuscular Hemoglobin


Concent 33 g/dL


(31-37) 


 


 





 


Red Cell Distribution Width


 17.7 %


(11.5-14.5) 


 


 





 


Platelet Count


 394 x10^3/uL


(140-400) 


 


 





 


Neutrophils (%) (Auto) 78 % (31-73)    


 


Lymphocytes (%) (Auto) 12 % (24-48)    


 


Monocytes (%) (Auto) 8 % (0-9)    


 


Eosinophils (%) (Auto) 2 % (0-3)    


 


Basophils (%) (Auto) 0 % (0-3)    


 


Neutrophils # (Auto)


 9.7 x10^3/uL


(1.8-7.7) 


 


 





 


Lymphocytes # (Auto)


 1.4 x10^3/uL


(1.0-4.8) 


 


 





 


Monocytes # (Auto)


 1.0 x10^3/uL


(0.0-1.1) 


 


 





 


Eosinophils # (Auto)


 0.2 x10^3/uL


(0.0-0.7) 


 


 





 


Basophils # (Auto)


 0.0 x10^3/uL


(0.0-0.2) 


 


 





 


Sodium Level


 137 mmol/L


(136-145) 


 


 





 


Potassium Level


 4.5 mmol/L


(3.5-5.1) 


 


 





 


Chloride Level


 101 mmol/L


() 


 


 





 


Carbon Dioxide Level


 35 mmol/L


(21-32) 


 


 





 


Anion Gap 1 (6-14)    


 


Blood Urea Nitrogen


 16 mg/dL


(7-20) 


 


 





 


Creatinine


 0.6 mg/dL


(0.6-1.0) 


 


 





 


Estimated GFR


(Cockcroft-Gault) 106.3 


 


 


 





 


Glucose Level


 129 mg/dL


(70-99) 


 


 





 


Calcium Level


 10.5 mg/dL


(8.5-10.1) 


 


 





 


Magnesium Level


 2.0 mg/dL


(1.8-2.4) 


 


 





 


O2 Saturation  97 % (92-99)   


 


Arterial Blood pH


 


 7.41


(7.35-7.45) 


 





 


Arterial Blood pCO2 at


Patient Temp 


 55 mmHg


(35-46) 


 





 


Arterial Blood pO2 at Patient


Temp 


 108 mmHg


() 


 





 


Arterial Blood HCO3


 


 34 mmol/L


(21-28) 


 





 


Arterial Blood Base Excess


 


 8 mmol/L


(-3-3) 


 





 


FiO2  33   








Medications





Active Scripts








 Medications  Dose


 Route/Sig


 Max Daily Dose Days Date Category


 


 Bisoprolol


 Fumarate 5 Mg


 Tablet  10 Mg


 PO DAILY


   3/16/20 Reported








Comments


 CXR 6/28/20


IMPRESSION:


No significant interval change compared to 6/25/2020.


 











ct abdomen /pelvis 6/6


1. Removal of the percutaneous pigtail drainage catheters since the prior 


exam. Sequela of pancreatitis with extensive pseudocysts again 


demonstrated, the right-sided collections are slightly larger since the 


prior exam, the left-sided collections are stable. See above.


2. Moderate to large left pleural effusion with atelectasis and collapse 


of most of the left lower lobe, stable. Small right pleural effusion is 


stable.


3. Gallstone.





ct chest 6/15 reviewed








 GRAM NEG COCCOBACILLI:MANY


        SQUAMOUS EPI CELL:RARE


        PMN (WBCs):FEW


        Unless otherwise specified, Testing Performed by:


        [address] [id number]


        For Inquires, the Physician may contact the Microbiology


        department at [phone]





  RESPIRATORY CULTURE  Final  


        Final





        MANY GRAM NEGATIVE RODS on 06/15/20 at 1105


        FINAL ID= [PSEUDOMONAS AERUGINOSA]


        MICRO CHARGES


        PSEUDOMONAS AERUGINOSA





  ANTIMICROBIAL SUSCEPTIBILITY  Final  


        Comment





        NEG ANSON 56


        PSEUDOMONAS AERUGINOSA


        ANTIBIOTIC                        RESULT          INTERPRETATION


        AMIKACIN                          <=16                  S


        AZTREONAM                         <=4                   S


        CEFTAZIDIME                       <=1                   S


        CIPROFLOXACIN                     <=0.25                S


        CEFEPIME                          <=2                   S


        CEFTAZIDIME/AVIBACTAM             <=4                   S


        GENTAMICIN                        <=2                   S


        LEVOFLOXACIN                      <=0.5                 S





Impression


.


IMPRESSION:


1.  Acute hypoxemic respiratory failure secondary to ARDS status post trach, 

developed anemia 6/7, blood drainage from RLQ abdomen drain site, and surroun

ding firmness  / developed septic shock 6/7 from abdomen source, required levo 

6/7


s/p 3 new drains 6/7 with brown color drainage,  on/off vent , on TS now


2.  Gallstone pancreatitis, now with ongoing bleeding from prior drain. Anemic. 

s/p Tx multiple units over several days


3.  septic shock/sepsis, recurrent 6/7, source abdomen. , off levo now, 


4.  Acute kidney injury-, Off HD--renal function decling. suspect JED on CKD due

to hypotension , improved now


5.  Acute gallstone pancreatitis.


6.  Hypoalbuminemia.


7.  Moderate persistent effusions, s/p left thora  5/12, reaccumulation of left 

effusion. O2 requirement not changed. 


8.  Fever- ,hypotension. suspect recurrent sepsis/ likely pancreatic source.  

Per ID, per surgery--


9.  Chronic anemia-- ongoing / s/p PRBC


10. Covid 19 testing negative


11. Moderate to large ascites-S/P paracentisis


12.S/P paracentisis with 4 liters removed on 4/15/20


13. S/P IR drain placement on 5/8/2020, removal, re inserted 6/7


14. Depression/Anxiety 


15. Increase effusion, ? loculated/ s/p chest tube.. drainage slowing down. 200 

cc /24 hr





Plan


.


1. TS as tolerated  titrate fio2 to keep sat 94%, off  vent 


2. Follow all cultures/ PSA from pelvic drains. Abx per ID/  thoracentesis 

result transudate, await c/s,


3. Follow surgery recs-- Acute pancreatitis with persistent necrosis ---- 4/27 

status post ROBERT drain placement + C paropsilosis; 5/6 + yeast & high amylase; s/p

additional drains on 5/8, removal of drains and now increase pseudocyst and 

increase fluid collection. likely infected fluid/ sepsis. on BS abx.follow 

surgery rec, planning surgical intervention Tuesday 


4. Follow ID recs for ABX---- currently monitoring off ABX 


5. Follow nephrology recs -----


6. Continue TPN 


7. monitor HGB,  4 units since 6/6--monitor HGB transfuse if less than 8.5 


8 s/p  thoracentesis, 5/12, 3 litres removed, s/p ct on 6/15. ct drainage, 180 

cc over the past 24 hrs,  suction -40, am cxr, flushed chest tube 6/22


9. Pt remains critically ill from severe necrotic pancreatis. Even with surgery 

prognosis grim. Surgery informed family.


10. lasix/albumin prn


11.  sputum gram neg cocobacilli. pan sensitive--per ID


12. Abdominal fluid culture 6 /7 MDRO Pseudomonas, yeast-- per ID





     


DVT/GI PPX: protonix---


PT/OT


D/W RN, RT





CC time 30 minutes











STEVE MIRANDA MD              Jun 28, 2020 10:14

## 2020-06-28 NOTE — PDOC
SURGICAL PROGRESS NOTE


Subjective


Pt awake appears comfortable


Vital Signs





Vital Signs








  Date Time  Temp Pulse Resp B/P (MAP) Pulse Ox O2 Delivery O2 Flow Rate FiO2


 


6/28/20 11:00  106 28 104/66 (79) 100 Tracheal Collar  


 


6/28/20 08:00 99.0       





 99.0       


 


6/28/20 05:05       8.0 








I&O











Intake and Output 


 


 6/28/20





 07:00


 


Intake Total 1929.9 ml


 


Output Total 1810 ml


 


Balance 119.9 ml


 


 


 


IV Total 1929.9 ml


 


Output Urine Total 1450 ml


 


Gastric Drainage Total 300 ml


 


Chest Tube Drainage Total 60 ml


 


Drainage Total 0 ml








General:  Alert, Cooperative, No acute distress


Abdomen:  Soft, No tenderness, Other (drains with pancreatic necrosis)


Labs





Laboratory Tests








Test


 6/26/20


13:10 6/26/20


17:54 6/27/20


00:20 6/27/20


06:07


 


Glucose (Fingerstick)


 134 mg/dL


(70-99) 117 mg/dL


(70-99) 143 mg/dL


(70-99) 134 mg/dL


(70-99)


 


Test


 6/27/20


12:15 6/27/20


17:30 6/28/20


00:30 6/28/20


06:06


 


O2 Saturation 95 % (92-99)    


 


Arterial Blood pH


 7.41


(7.35-7.45) 


 


 





 


Arterial Blood pCO2 at


Patient Temp 54 mmHg


(35-46) 


 


 





 


Arterial Blood pO2 at Patient


Temp 79 mmHg


() 


 


 





 


Arterial Blood HCO3


 34 mmol/L


(21-28) 


 


 





 


Arterial Blood Base Excess


 8 mmol/L


(-3-3) 


 


 





 


FiO2 33    


 


Glucose (Fingerstick)


 


 135 mg/dL


(70-99) 121 mg/dL


(70-99) 128 mg/dL


(70-99)


 


Test


 6/28/20


08:15 6/28/20


08:45 


 





 


White Blood Count


 12.4 x10^3/uL


(4.0-11.0) 


 


 





 


Red Blood Count


 3.06 x10^6/uL


(3.50-5.40) 


 


 





 


Hemoglobin


 8.5 g/dL


(12.0-15.5) 


 


 





 


Hematocrit


 26.0 %


(36.0-47.0) 


 


 





 


Mean Corpuscular Volume 85 fL ()    


 


Mean Corpuscular Hemoglobin 28 pg (25-35)    


 


Mean Corpuscular Hemoglobin


Concent 33 g/dL


(31-37) 


 


 





 


Red Cell Distribution Width


 17.7 %


(11.5-14.5) 


 


 





 


Platelet Count


 394 x10^3/uL


(140-400) 


 


 





 


Neutrophils (%) (Auto) 78 % (31-73)    


 


Lymphocytes (%) (Auto) 12 % (24-48)    


 


Monocytes (%) (Auto) 8 % (0-9)    


 


Eosinophils (%) (Auto) 2 % (0-3)    


 


Basophils (%) (Auto) 0 % (0-3)    


 


Neutrophils # (Auto)


 9.7 x10^3/uL


(1.8-7.7) 


 


 





 


Lymphocytes # (Auto)


 1.4 x10^3/uL


(1.0-4.8) 


 


 





 


Monocytes # (Auto)


 1.0 x10^3/uL


(0.0-1.1) 


 


 





 


Eosinophils # (Auto)


 0.2 x10^3/uL


(0.0-0.7) 


 


 





 


Basophils # (Auto)


 0.0 x10^3/uL


(0.0-0.2) 


 


 





 


Sodium Level


 137 mmol/L


(136-145) 


 


 





 


Potassium Level


 4.5 mmol/L


(3.5-5.1) 


 


 





 


Chloride Level


 101 mmol/L


() 


 


 





 


Carbon Dioxide Level


 35 mmol/L


(21-32) 


 


 





 


Anion Gap 1 (6-14)    


 


Blood Urea Nitrogen


 16 mg/dL


(7-20) 


 


 





 


Creatinine


 0.6 mg/dL


(0.6-1.0) 


 


 





 


Estimated GFR


(Cockcroft-Gault) 106.3 


 


 


 





 


Glucose Level


 129 mg/dL


(70-99) 


 


 





 


Calcium Level


 10.5 mg/dL


(8.5-10.1) 


 


 





 


Magnesium Level


 2.0 mg/dL


(1.8-2.4) 


 


 





 


O2 Saturation  97 % (92-99)   


 


Arterial Blood pH


 


 7.41


(7.35-7.45) 


 





 


Arterial Blood pCO2 at


Patient Temp 


 55 mmHg


(35-46) 


 





 


Arterial Blood pO2 at Patient


Temp 


 108 mmHg


() 


 





 


Arterial Blood HCO3


 


 34 mmol/L


(21-28) 


 





 


Arterial Blood Base Excess


 


 8 mmol/L


(-3-3) 


 





 


FiO2  33   








Laboratory Tests








Test


 6/27/20


12:15 6/27/20


17:30 6/28/20


00:30 6/28/20


06:06


 


O2 Saturation 95 % (92-99)    


 


Arterial Blood pH


 7.41


(7.35-7.45) 


 


 





 


Arterial Blood pCO2 at


Patient Temp 54 mmHg


(35-46) 


 


 





 


Arterial Blood pO2 at Patient


Temp 79 mmHg


() 


 


 





 


Arterial Blood HCO3


 34 mmol/L


(21-28) 


 


 





 


Arterial Blood Base Excess


 8 mmol/L


(-3-3) 


 


 





 


FiO2 33    


 


Glucose (Fingerstick)


 


 135 mg/dL


(70-99) 121 mg/dL


(70-99) 128 mg/dL


(70-99)


 


Test


 6/28/20


08:15 6/28/20


08:45 


 





 


White Blood Count


 12.4 x10^3/uL


(4.0-11.0) 


 


 





 


Red Blood Count


 3.06 x10^6/uL


(3.50-5.40) 


 


 





 


Hemoglobin


 8.5 g/dL


(12.0-15.5) 


 


 





 


Hematocrit


 26.0 %


(36.0-47.0) 


 


 





 


Mean Corpuscular Volume 85 fL ()    


 


Mean Corpuscular Hemoglobin 28 pg (25-35)    


 


Mean Corpuscular Hemoglobin


Concent 33 g/dL


(31-37) 


 


 





 


Red Cell Distribution Width


 17.7 %


(11.5-14.5) 


 


 





 


Platelet Count


 394 x10^3/uL


(140-400) 


 


 





 


Neutrophils (%) (Auto) 78 % (31-73)    


 


Lymphocytes (%) (Auto) 12 % (24-48)    


 


Monocytes (%) (Auto) 8 % (0-9)    


 


Eosinophils (%) (Auto) 2 % (0-3)    


 


Basophils (%) (Auto) 0 % (0-3)    


 


Neutrophils # (Auto)


 9.7 x10^3/uL


(1.8-7.7) 


 


 





 


Lymphocytes # (Auto)


 1.4 x10^3/uL


(1.0-4.8) 


 


 





 


Monocytes # (Auto)


 1.0 x10^3/uL


(0.0-1.1) 


 


 





 


Eosinophils # (Auto)


 0.2 x10^3/uL


(0.0-0.7) 


 


 





 


Basophils # (Auto)


 0.0 x10^3/uL


(0.0-0.2) 


 


 





 


Sodium Level


 137 mmol/L


(136-145) 


 


 





 


Potassium Level


 4.5 mmol/L


(3.5-5.1) 


 


 





 


Chloride Level


 101 mmol/L


() 


 


 





 


Carbon Dioxide Level


 35 mmol/L


(21-32) 


 


 





 


Anion Gap 1 (6-14)    


 


Blood Urea Nitrogen


 16 mg/dL


(7-20) 


 


 





 


Creatinine


 0.6 mg/dL


(0.6-1.0) 


 


 





 


Estimated GFR


(Cockcroft-Gault) 106.3 


 


 


 





 


Glucose Level


 129 mg/dL


(70-99) 


 


 





 


Calcium Level


 10.5 mg/dL


(8.5-10.1) 


 


 





 


Magnesium Level


 2.0 mg/dL


(1.8-2.4) 


 


 





 


O2 Saturation  97 % (92-99)   


 


Arterial Blood pH


 


 7.41


(7.35-7.45) 


 





 


Arterial Blood pCO2 at


Patient Temp 


 55 mmHg


(35-46) 


 





 


Arterial Blood pO2 at Patient


Temp 


 108 mmHg


() 


 





 


Arterial Blood HCO3


 


 34 mmol/L


(21-28) 


 





 


Arterial Blood Base Excess


 


 8 mmol/L


(-3-3) 


 





 


FiO2  33   








Problem List


Problems


Medical Problems:


(1) Acute pancreatitis


Status: Acute  





(2) Cholelithiasis


Status: Acute  








Assessment/Plan


severe pancreatic necrosis


pt appears stable.  Pt in holding pattern at this point without significant 

improvement.


Favor proceeding with pancreatic necrosectomy, cholecystectomy with cholang

iogram and feeling tube.


D/w pt's supportive daughter extensively at bedside.  R/R/B/A d/w.  Risks, 

bleeding, infection, damage to surrounding structures, risk of anesthesia, risk 

of persistent drainage, risk of death.


She is poor candidate at this time, but without intervention unlikely to 

progress.  Recommend proceeding with intervention.  She appears to understand, 

her questions are answered and she elects to proceed.





Justicifation of Admission Dx:


Justifications for Admission:


Justification of Admission Dx:  Yes











GAMAL ZHOU MD             Jun 28, 2020 11:48

## 2020-06-28 NOTE — PDOC
Infectious Disease Note


Subjective:


Subjective


Patient is alert but confused


Discussed with RN


Had hallucinations last night


No fevers last 24 hours


Remains on trach collar





Vital Signs:


Vital Signs





Vital Signs








  Date Time  Temp Pulse Resp B/P (MAP) Pulse Ox O2 Delivery O2 Flow Rate FiO2


 


6/28/20 07:31      Trach Collar  


 


6/28/20 05:05     100  8.0 


 


6/28/20 04:00 99.2 109 20 112/66 (81)    





 99.2       











Physical Exam:


PHYSICAL EXAM


GENERAL: Patient alert awake comfortable


HEENT: Anicteric, no thrush, NG tube in place


NECK:  Tracheostomy 


LUNGS: Diminished aeration bases, no accessory muscle use - CT on left 


HEART:  S1, S2,regular 


ABDOMEN: Mild distention, bowel sounds present, soft, grimaces to palpation, 

drains x 3


: [place] ( 6/7)


EXTREMITIES: + edema BLE


SKIN: no signs of gen rash 


LUE-PICC  without signs of complications





Medications:


Inpatient Meds:





Current Medications








 Medications


  (Trade)  Dose


 Ordered  Sig/Yee  Start Time


 Stop Time Status Last Admin


Dose Admin


 


 Acetaminophen


  (Tylenol Supp)  650 mg  PRN Q6HRS  PRN  3/24/20 10:30


    6/18/20 10:16


650 MG


 


 Acetaminophen


  (Tylenol)  650 mg  PRN Q6HRS  PRN  3/21/20 03:36


 5/13/20 10:25 DC 4/16/20 19:56


650 MG


 


 Acetylcysteine


  (Mucomyst 20%


 Resp Treatment)  600 mg  RTBID  6/27/20 12:00


    6/27/20 19:40


600 MG


 


 Albumin Human  250 ml @ 


 62.5 mls/hr  1X  ONCE  6/24/20 15:30


 6/24/20 19:29 DC 6/24/20 16:27


62.5 MLS/HR


 


 Albuterol Sulfate


  (Ventolin Neb


 Soln)  2.5 mg  1X  ONCE  3/17/20 22:30


 3/17/20 22:31 DC 3/18/20 00:56


2.5 MG


 


 Albuterol/


 Ipratropium


  (Duoneb)  3 ml  Q4HRS  6/13/20 08:00


    6/28/20 04:40


3 ML


 


 Alteplase,


 Recombinant


  (Cathflo For


 Central Catheter


 Clearance)  4 mg  1X  ONCE  6/17/20 09:15


 6/17/20 09:16 UNV  





 


 Alteplase,


 Recombinant 4 mg/


 Sodium Chloride  20 ml @ 20


 mls/hr  1X  ONCE  6/17/20 10:00


 6/17/20 10:59 DC 6/17/20 10:09


20 MLS/HR


 


 Amino Acids/


 Glycerin/


 Electrolytes  1,000 ml @ 


 75 mls/hr  [id number]  4/20/20 21:15


   UNV  





 


 Artificial Tears


  (Artificial


 Tears)  1 drop  PRN Q15MIN  PRN  4/29/20 05:30


    6/23/20 21:17


1 DROP


 


 Atenolol


  (Tenormin)  100 mg  DAILY  3/17/20 09:00


 3/16/20 20:08 DC  





 


 Atropine Sulfate


  (ATROPINE 0.5mg


 SYRINGE)  0.5 mg  PRN Q5MIN  PRN  4/2/20 08:15


     





 


 Barium Sulfate


  (Varibar Thin


 Liquid Apple)  148 gm  1X  ONCE  5/26/20 11:45


 5/26/20 11:49 DC  





 


 Benzocaine


  (Hurricaine One)  1 spray  1X  ONCE  3/20/20 14:30


 3/20/20 14:31 DC 3/20/20 16:38


1 SPRAY


 


 Bisacodyl


  (Dulcolax Supp)  10 mg  STK-MED ONCE  4/27/20 10:59


 4/27/20 10:59 DC  





 


 Bumetanide


  (Bumex)  2 mg  DAILY  5/8/20 10:00


 5/18/20 17:15 DC 5/18/20 08:07


2 MG


 


 Bupivacaine HCl/


 Epinephrine Bitart


  (Sensorcain-Epi


 0.5%-1:441187 Mpf)  30 ml  STK-MED ONCE  4/27/20 10:58


 4/27/20 10:58 DC 4/27/20 12:01


7 ML


 


 Calcium Carbonate/


 Glycine


  (Tums)  500 mg  PRN [id number]  PRN  3/18/20 17:45


 5/13/20 10:25 DC  





 


 Calcium Chloride


 1000 mg/Sodium


 Chloride  110 ml @ 


 220 mls/hr  1X  ONCE  3/17/20 22:30


 3/17/20 22:59 DC 3/17/20 22:11


220 MLS/HR


 


 Calcium Chloride


 3000 mg/Sodium


 Chloride  1,030 ml @ 


 50 mls/hr  [id number]  3/19/20 08:00


 3/21/20 15:23 DC 3/21/20 02:17


50 MLS/HR


 


 Calcium Gluconate


  (Calcium


 Gluconate)  2,000 mg  1X  ONCE  3/19/20 02:15


 3/19/20 02:16 DC 3/19/20 02:19


2,000 MG


 


 Calcium Gluconate


 1000 mg/Sodium


 Chloride  110 ml @ 


 220 mls/hr  1X  ONCE  3/18/20 03:30


 3/18/20 03:59 DC 3/18/20 03:21


220 MLS/HR


 


 Calcium Gluconate


 2000 mg/Sodium


 Chloride  120 ml @ 


 220 mls/hr  1X  ONCE  3/18/20 07:30


 3/18/20 08:02 DC 3/18/20 09:05


220 MLS/HR


 


 Cefepime HCl


  (Maxipime)  2 gm  Q12HR  3/25/20 09:00


 4/8/20 09:58 DC 4/7/20 20:56


2 GM


 


 Cellulose


  (Surgicel


 Fibrillar 1x2)  1 each  STK-MED ONCE  4/6/20 11:00


 4/6/20 11:01 DC  





 


 Cellulose


  (Surgicel


 Hemostat 2x14)  1 each  STK-MED ONCE  4/27/20 10:58


 4/27/20 10:59 DC  





 


 Cellulose


  (Surgicel


 Hemostat 4x8)  1 each  STK-MED ONCE  4/27/20 10:58


 4/27/20 10:59 DC  





 


 Chlorhexidine


 Gluconate


  (Peridex)  15 ml  BID  6/13/20 09:00


 6/13/20 07:58 DC  





 


 Ciprofloxacin/


 Dextrose  200 ml @ 


 200 mls/hr  Q12HR  6/18/20 21:00


 6/25/20 08:56 DC 6/25/20 08:27


200 MLS/HR


 


 Cyclobenzaprine


 HCl


  (Flexeril)  10 mg  PRN Q6HRS  PRN  4/30/20 10:45


     





 


 Daptomycin 410 mg/


 Sodium Chloride  50 ml @ 


 100 mls/hr  Q24H  6/7/20 14:00


 6/10/20 08:30 DC 6/9/20 13:33


100 MLS/HR


 


 Daptomycin 430 mg/


 Sodium Chloride  50 ml @ 


 100 mls/hr  Q24H  4/25/20 13:00


 4/30/20 20:58 DC 4/30/20 13:00


100 MLS/HR


 


 Daptomycin 450 mg/


 Sodium Chloride  50 ml @ 


 100 mls/hr  Q24H  5/17/20 09:00


 5/21/20 08:30 DC 5/20/20 09:25


100 MLS/HR


 


 Daptomycin 485 mg/


 Sodium Chloride  50 ml @ 


 100 mls/hr  Q24H  5/4/20 11:00


 5/12/20 07:44 DC 5/11/20 13:10


100 MLS/HR


 


 Daptomycin 500 mg/


 Sodium Chloride  50 ml @ 


 100 mls/hr  Q48H  3/25/20 08:30


 4/10/20 10:07 DC 4/10/20 09:57


100 MLS/HR


 


 Dexamethasone


 Sodium Phosphate


  (Decadron)  4 mg  STK-MED ONCE  4/27/20 10:56


 4/27/20 10:57 DC  





 


 Dexmedetomidine


 HCl 400 mcg/


 Sodium Chloride  100 ml @ 0


 mls/hr  CONT  PRN  4/2/20 08:15


 5/30/20 18:31 DC 5/30/20 12:57


8 MLS/HR


 


 Dextrose


  (Dextrose


 50%-Water Syringe)  12.5 gm  PRN Q15MIN  PRN  3/16/20 09:30


     





 


 Digoxin


  (Lanoxin)  125 mcg  1X  ONCE  3/19/20 18:00


 3/19/20 18:01 DC 3/19/20 17:10


125 MCG


 


 Diphenhydramine


 HCl


  (Benadryl)  25 mg  1X PRN  PRN  4/24/20 15:45


 4/25/20 15:44 DC  





 


 Duloxetine HCl


  (Cymbalta)  30 mg  DAILY  5/10/20 14:00


 5/13/20 10:25 DC 5/11/20 09:48


30 MG


 


 Enoxaparin Sodium


  (Lovenox 100mg


 Syringe)  100 mg  Q12HR  4/21/20 21:00


   UNV  





 


 Enoxaparin Sodium


  (Lovenox 40mg


 Syringe)  40 mg  Q24H  5/17/20 17:00


 6/7/20 06:50 DC 6/5/20 17:44


40 MG


 


 Etomidate


  (Amidate)  8 mg  1X  ONCE  3/23/20 08:30


 3/23/20 08:31 DC 3/23/20 08:33


8 MG


 


 Fentanyl


  (Duragesic 50mcg/


 Hr Patch)  1 patch  Q72H  6/4/20 21:00


 6/13/20 12:00 DC 6/4/20 21:22


1 PATCH


 


 Fentanyl Citrate


  (Fentanyl 2ml


 Vial)  50 mcg  PRN Q1HR  PRN  6/13/20 06:00


     





 


 Fentanyl Citrate


  (Fentanyl 5ml


 Vial)  250 mcg  1X  ONCE  5/8/20 09:15


 5/8/20 09:16 DC 5/8/20 09:30


50 MCG


 


 Flumazenil


  (Romazicon)  0.5 mg  STK-MED ONCE  6/7/20 14:48


 6/7/20 14:48 DC  





 


 Fluoxetine HCl


  (PROzac)  20 mg  QHS  6/4/20 21:00


    6/27/20 21:52


20 MG


 


 Furosemide


  (Lasix)  40 mg  1X  ONCE  6/24/20 15:30


 6/24/20 15:33 DC 6/24/20 16:27


40 MG


 


 Haloperidol


 Lactate


  (Haldol Inj)  3 mg  1X  ONCE  5/4/20 14:30


 5/4/20 14:31 DC 5/4/20 14:37


3 MG


 


 Heparin Sodium


  (Porcine)


  (Hep Lock Adult)  500 unit  STK-MED ONCE  4/7/20 09:29


 4/7/20 09:30 DC  





 


 Heparin Sodium


  (Porcine)


  (Heparin Sodium)  5,000 unit  Q12HR  4/27/20 21:00


 5/7/20 09:59 DC 5/6/20 20:57


5,000 UNIT


 


 Heparin Sodium


  (Porcine) 1000


 unit/Sodium


 Chloride  1,001 ml @ 


 1,001 mls/hr  1X  ONCE  4/27/20 12:00


 4/27/20 12:59 DC  





 


 Hydromorphone HCl


  (Dilaudid


 Standard PCA)  12 mg  STK-MED ONCE  5/1/20 15:50


 5/12/20 11:24 DC  





 


 Hydromorphone HCl


  (Dilaudid)  1 mg  PRN Q4HRS  PRN  5/4/20 19:00


 5/18/20 17:10 DC 5/18/20 06:25


1 MG


 


 Info


  (CONTRAST GIVEN


 -- Rx MONITORING)  1 each  PRN DAILY  PRN  3/30/20 11:45


 4/1/20 11:44 DC  





 


 Info


  (Icu Electrolyte


 Protocol)  1 ea  CONT PRN  PRN  3/29/20 13:15


     





 


 Info


  (PHARMACY


 MONITORING -- do


 not chart)  1 each  PRN DAILY  PRN  4/24/20 15:45


 5/26/20 14:14 DC  





 


 Info


  (Tpn Per


 Pharmacy)  1 each  PRN DAILY  PRN  3/18/20 12:30


   UNV  





 


 Insulin Human


 Lispro


  (HumaLOG)  0-9 UNITS  Q6HRS  3/16/20 09:30


    6/24/20 18:05


4 UNITS


 


 Insulin Human


 Regular


  (HumuLIN R VIAL)  5 unit  1X  ONCE  3/17/20 22:30


 3/17/20 22:31 DC 3/17/20 22:14


5 UNIT


 


 Iohexol


  (Omnipaque 240


 Mg/ml)  30 ml  1X  ONCE  3/30/20 11:30


 3/30/20 11:33 DC 3/30/20 11:30


30 ML


 


 Iohexol


  (Omnipaque 300


 Mg/ml)  50 ml  STK-MED ONCE  4/27/20 10:58


 4/27/20 10:59 DC  





 


 Iohexol


  (Omnipaque 350


 Mg/ml)  90 ml  1X  ONCE  3/16/20 03:30


 3/16/20 03:31 DC 3/16/20 03:25


90 ML


 


 Ketorolac


 Tromethamine


  (Toradol 30mg


 Vial)  30 mg  1X  ONCE  3/16/20 03:00


 3/16/20 03:01 DC 3/16/20 02:54


30 MG


 


 Lidocaine HCl


  (Buffered


 Lidocaine 1%)  3 ml  1X  ONCE  6/15/20 13:00


 6/15/20 13:01 DC 6/15/20 13:11


12 ML


 


 Lidocaine HCl


  (Glydo


  (Lidocaine) Jelly)  1 thomas  1X  ONCE  3/20/20 14:30


 3/20/20 14:31 DC 3/20/20 16:38


1 THOMAS


 


 Lidocaine HCl


  (Lidocaine 1%


 20ml Vial)  20 ml  1X  ONCE  6/7/20 15:00


 6/7/20 15:01 DC 6/7/20 15:30


20 ML


 


 Lidocaine HCl


  (Xylocaine-Mpf


 1% 2ml Vial)  2 ml  PRN 1X  PRN  4/27/20 07:00


 4/28/20 06:59 DC  





 


 Linezolid/Dextrose  300 ml @ 


 300 mls/hr  Q12HR  5/17/20 09:00


 5/20/20 08:11 DC 5/19/20 21:08


300 MLS/HR


 


 Lorazepam


  (Ativan Inj)  0.25 mg  PRN Q4HRS  PRN  6/3/20 07:30


    6/27/20 13:37


0.25 MG


 


 Magnesium Sulfate  50 ml @ 25


 mls/hr  1X  ONCE  6/16/20 08:15


 6/16/20 10:14 DC  





 


 Meropenem 1 gm/


 Sodium Chloride  100 ml @ 


 200 mls/hr  Q12HR  6/7/20 21:00


 6/25/20 08:56 DC 6/25/20 08:27


200 MLS/HR


 


 Meropenem 500 mg/


 Sodium Chloride  50 ml @ 


 100 mls/hr  Q6HRS  5/25/20 18:00


 5/27/20 09:59 DC 5/27/20 06:02


100 MLS/HR


 


 Methylprednisolone


 Sodium Succinate


  (SOLU-Medrol


 125MG VIAL)  125 mg  1X  ONCE  6/13/20 06:15


 6/13/20 06:16 DC 6/13/20 06:26


125 MG


 


 Metoclopramide HCl


  (Reglan Vial)  10 mg  PRN Q3HRS  PRN  5/9/20 16:45


    5/14/20 04:25


10 MG


 


 Metoprolol


 Tartrate


  (Lopressor Vial)  5 mg  PRN Q6HRS  PRN  6/10/20 09:00


    6/18/20 10:14


5 MG


 


 Metronidazole  100 ml @ 


 100 mls/hr  Q8HRS  4/14/20 10:00


 4/21/20 08:10 DC 4/21/20 06:04


100 MLS/HR


 


 Micafungin Sodium


 100 mg/Dextrose  100 ml @ 


 100 mls/hr  Q24H  6/7/20 11:00


 6/25/20 08:56 DC 6/24/20 12:34


100 MLS/HR


 


 Midazolam HCl


  (Versed)  2 mg  1X  ONCE  6/7/20 15:00


 6/7/20 15:01 DC 6/7/20 15:28


1 MG


 


 Midazolam HCl 100


 mg/Sodium Chloride  100 ml @ 7


 mls/hr  CONT  PRN  3/28/20 16:00


 6/3/20 14:38 DC 4/8/20 15:35


7 MLS/HR


 


 Midazolam HCl 50


 mg/Sodium Chloride  50 ml @ 0


 mls/hr  CONT  PRN  3/23/20 08:15


 3/28/20 15:59 DC 3/26/20 22:39


7 MLS/HR


 


 Morphine Sulfate


  (Morphine


 Sulfate)  2 mg  PRN Q2HR  PRN  3/16/20 05:00


 3/17/20 14:15 DC 3/17/20 12:26


2 MG


 


 Multi-Ingred


 Cream/Lotion/Oil/


 Oint


  (Artificial


 Tears Eye


 Ointment)  1 thomas  PRN Q1HR  PRN  3/25/20 17:30


 6/3/20 14:39 DC 4/13/20 08:19


1 THOMAS


 


 Naloxone HCl


  (Narcan)  0.4 mg  STK-MED ONCE  6/7/20 14:48


 6/7/20 14:48 DC  





 


 Norepinephrine


 Bitartrate 8 mg/


 Dextrose  258 ml @ 


 13.332 mls/


 hr  CONT  PRN  6/7/20 06:30


    6/7/20 21:46


13.332 MLS/HR


 


 Ondansetron HCl


  (Zofran)  4 mg  STK-MED ONCE  4/27/20 10:56


 4/27/20 10:57 DC  





 


 Pantoprazole


 Sodium


  (PROTONIX VIAL


 for IV PUSH)  40 mg  DAILYAC  3/16/20 11:30


    6/27/20 08:56


40 MG


 


 Phenylephrine HCl


  (PHENYLEPHRINE


 in 0.9% NACL PF)  1 mg  STK-MED ONCE  4/27/20 12:34


 4/27/20 12:34 DC  





 


 Piperacillin Sod/


 Tazobactam Sod


 3.375 gm/Sodium


 Chloride  50 ml @ 


 100 mls/hr  Q6HRS  5/27/20 12:00


 6/4/20 07:26 DC 6/4/20 06:10


100 MLS/HR


 


 Piperacillin Sod/


 Tazobactam Sod


 4.5 gm/Sodium


 Chloride  100 ml @ 


 200 mls/hr  1X  ONCE  3/16/20 06:00


 3/16/20 06:29 DC 3/16/20 05:44


200 MLS/HR


 


 Potassium


 Chloride 110 meq/


 Magnesium Sulfate


 20 meq/


 Multivitamins 10


 ml/Chromium/


 Copper/Manganese/


 Seleni/Zn 1 ml/


 Insulin Human


 Regular 15 unit/


 Total Parenteral


 Nutrition/Amino


 Acids/Dextrose/


 Fat Emulsion


 Intravenous  1,800 ml @ 


 75 mls/hr  TPN  CONT  5/24/20 22:00


 5/25/20 21:59 DC 5/24/20 22:48


75 MLS/HR


 


 Potassium


 Chloride 15 meq/


 Bicarbonate


 Dialysis Soln w/


 out KCl  5,007.5 ml


  @ 1,000 mls/


 hr  Q5H1M  3/29/20 20:00


 4/2/20 13:08 DC 4/1/20 18:14


1,000 MLS/HR


 


 Potassium


 Chloride 20 meq/


 Bicarbonate


 Dialysis Soln w/


 out KCl  5,010 ml @ 


 1,000 mls/hr  Q5H1M  3/25/20 16:00


 3/29/20 19:59 DC 3/29/20 14:54


1,000 MLS/HR


 


 Potassium


 Chloride 40 meq/


 Potassium Acetate


 60 meq/Magnesium


 Sulfate 10 meq/


 Multivitamins 10


 ml/Chromium/


 Copper/Manganese/


 Seleni/Zn 1 ml/


 Insulin Human


 Regular 20 unit/


 Total Parenteral


 Nutrition/Amino


 Acids/Dextrose/


 Fat Emulsion


 Intravenous  1,800 ml @ 


 75 mls/hr  TPN  CONT  6/4/20 22:00


 6/5/20 21:59 DC 6/5/20 00:03


75 MLS/HR


 


 Potassium


 Chloride 70 meq/


 Magnesium Sulfate


 20 meq/


 Multivitamins 10


 ml/Chromium/


 Copper/Manganese/


 Seleni/Zn 1 ml/


 Insulin Human


 Regular 15 unit/


 Total Parenteral


 Nutrition/Amino


 Acids/Dextrose/


 Fat Emulsion


 Intravenous  1,800 ml @ 


 75 mls/hr  TPN  CONT  5/29/20 22:00


 5/30/20 21:59 DC 5/29/20 23:13


75 MLS/HR


 


 Potassium


 Chloride 75 meq/


 Magnesium Sulfate


 15 meq/


 Multivitamins 10


 ml/Chromium/


 Copper/Manganese/


 Seleni/Zn 0.5 ml/


 Insulin Human


 Regular 15 unit/


 Total Parenteral


 Nutrition/Amino


 Acids/Dextrose/


 Fat Emulsion


 Intravenous  1,920 ml @ 


 80 mls/hr  TPN  CONT  5/9/20 22:00


 5/10/20 21:59 DC 5/9/20 22:41


80 MLS/HR


 


 Potassium


 Chloride 75 meq/


 Magnesium Sulfate


 15 meq/Calcium


 Gluconate 8 meq/


 Multivitamins 10


 ml/Chromium/


 Copper/Manganese/


 Seleni/Zn 0.5 ml/


 Insulin Human


 Regular 15 unit/


 Total Parenteral


 Nutrition/Amino


 Acids/Dextrose/


 Fat Emulsion


 Intravenous  1,920 ml @ 


 80 mls/hr  TPN  CONT  5/7/20 22:00


 5/8/20 21:59 DC 5/7/20 22:28


80 MLS/HR


 


 Potassium


 Chloride 75 meq/


 Magnesium Sulfate


 15 meq/Calcium


 Gluconate 8 meq/


 Multivitamins 10


 ml/Chromium/


 Copper/Manganese/


 Seleni/Zn 0.5 ml/


 Insulin Human


 Regular 20 unit/


 Total Parenteral


 Nutrition/Amino


 Acids/Dextrose/


 Fat Emulsion


 Intravenous  1,920 ml @ 


 80 mls/hr  TPN  CONT  5/6/20 22:00


 5/7/20 21:59 DC 5/6/20 22:00


80 MLS/HR


 


 Potassium


 Chloride 75 meq/


 Magnesium Sulfate


 15 meq/Calcium


 Gluconate 8 meq/


 Multivitamins 10


 ml/Chromium/


 Copper/Manganese/


 Seleni/Zn 0.5 ml/


 Insulin Human


 Regular 25 unit/


 Total Parenteral


 Nutrition/Amino


 Acids/Dextrose/


 Fat Emulsion


 Intravenous  1,920 ml @ 


 80 mls/hr  TPN  CONT  5/4/20 22:00


 5/5/20 21:59 DC 5/4/20 23:08


80 MLS/HR


 


 Potassium


 Chloride 75 meq/


 Magnesium Sulfate


 20 meq/Calcium


 Gluconate 10 meq/


 Multivitamins 10


 ml/Chromium/


 Copper/Manganese/


 Seleni/Zn 0.5 ml/


 Insulin Human


 Regular 25 unit/


 Total Parenteral


 Nutrition/Amino


 Acids/Dextrose/


 Fat Emulsion


 Intravenous  1,920 ml @ 


 80 mls/hr  TPN  CONT  5/3/20 22:00


 5/4/20 21:59 DC 5/3/20 22:04


80 MLS/HR


 


 Potassium


 Chloride 75 meq/


 Magnesium Sulfate


 20 meq/Calcium


 Gluconate 10 meq/


 Multivitamins 10


 ml/Chromium/


 Copper/Manganese/


 Seleni/Zn 0.5 ml/


 Insulin Human


 Regular 30 unit/


 Total Parenteral


 Nutrition/Amino


 Acids/Dextrose/


 Fat Emulsion


 Intravenous  1,920 ml @ 


 80 mls/hr  TPN  CONT  5/2/20 22:00


 5/3/20 22:00 DC 5/2/20 21:51


80 MLS/HR


 


 Potassium


 Chloride 80 meq/


 Magnesium Sulfate


 20 meq/


 Multivitamins 10


 ml/Chromium/


 Copper/Manganese/


 Seleni/Zn 0.5 ml/


 Insulin Human


 Regular 15 unit/


 Total Parenteral


 Nutrition/Amino


 Acids/Dextrose/


 Fat Emulsion


 Intravenous  1,920 ml @ 


 80 mls/hr  TPN  CONT  5/12/20 22:00


 5/13/20 21:59 DC 5/12/20 21:40


80 MLS/HR


 


 Potassium


 Chloride 80 meq/


 Magnesium Sulfate


 20 meq/


 Multivitamins 10


 ml/Chromium/


 Copper/Manganese/


 Seleni/Zn 1 ml/


 Insulin Human


 Regular 15 unit/


 Total Parenteral


 Nutrition/Amino


 Acids/Dextrose/


 Fat Emulsion


 Intravenous  1,800 ml @ 


 75 mls/hr  TPN  CONT  5/31/20 22:00


 6/1/20 21:59 DC 5/31/20 21:54


75 MLS/HR


 


 Potassium


 Chloride 90 meq/


 Magnesium Sulfate


 20 meq/


 Multivitamins 10


 ml/Chromium/


 Copper/Manganese/


 Seleni/Zn 1 ml/


 Insulin Human


 Regular 15 unit/


 Total Parenteral


 Nutrition/Amino


 Acids/Dextrose/


 Fat Emulsion


 Intravenous  1,800 ml @ 


 75 mls/hr  TPN  CONT  5/20/20 22:00


 5/21/20 21:59 DC 5/20/20 22:28


75 MLS/HR


 


 Potassium


 Chloride 90 meq/


 Magnesium Sulfate


 20 meq/


 Multivitamins 10


 ml/Chromium/


 Copper/Manganese/


 Seleni/Zn 1 ml/


 Insulin Human


 Regular 20 unit/


 Total Parenteral


 Nutrition/Amino


 Acids/Dextrose/


 Fat Emulsion


 Intravenous  1,800 ml @ 


 75 mls/hr  TPN  CONT  6/3/20 22:00


 6/4/20 21:59 DC 6/3/20 23:13


75 MLS/HR


 


 Potassium


 Chloride/Water  100 ml @ 


 100 mls/hr  Q1H  6/17/20 08:00


 6/17/20 09:59 DC 6/17/20 09:12


100 MLS/HR


 


 Potassium


 Phosphate 20 mmol/


 Sodium Chloride  106.6667


 ml @ 


 51.667 m...  1X  ONCE  3/25/20 13:00


 3/25/20 15:03 DC 3/25/20 12:51


51.667 MLS/HR


 


 Potassium Acetate


 30 meq/Magnesium


 Sulfate 14 meq/


 Multivitamins 10


 ml/Chromium/


 Copper/Manganese/


 Seleni/Zn 1 ml/


 Insulin Human


 Regular 15 unit/


 Sodium Chloride


 20 meq/Potassium


 Chloride 30 meq/


 Total Parenteral


 Nutrition/Amino


 Acids/Dextrose/


 Fat Emulsion


 Intravenous  1,920 ml @ 


 80 mls/hr  TPN  CONT  6/23/20 22:00


 6/24/20 21:59 DC 6/23/20 21:46


80 MLS/HR


 


 Potassium Acetate


 30 meq/Magnesium


 Sulfate 20 meq/


 Calcium Gluconate


 10 meq/


 Multivitamins 10


 ml/Chromium/


 Copper/Manganese/


 Seleni/Zn 0.5 ml/


 Insulin Human


 Regular 30 unit/


 Potassium


 Chloride 30 meq/


 Total Parenteral


 Nutrition/Amino


 Acids/Dextrose/


 Fat Emulsion


 Intravenous  1,920 ml @ 


 80 mls/hr  TPN  CONT  5/1/20 22:00


 5/2/20 21:59 DC 5/1/20 22:34


80 MLS/HR


 


 Potassium Acetate


 40 meq/Magnesium


 Sulfate 10 meq/


 Multivitamins 10


 ml/Chromium/


 Copper/Manganese/


 Seleni/Zn 1 ml/


 Insulin Human


 Regular 20 unit/


 Total Parenteral


 Nutrition/Amino


 Acids/Dextrose/


 Fat Emulsion


 Intravenous  1,920 ml @ 


 80 mls/hr  TPN  CONT  6/16/20 22:00


 6/17/20 21:59 DC 6/16/20 21:32


80 MLS/HR


 


 Potassium Acetate


 40 meq/Magnesium


 Sulfate 5 meq/


 Multivitamins 10


 ml/Chromium/


 Copper/Manganese/


 Seleni/Zn 1 ml/


 Insulin Human


 Regular 30 unit/


 Total Parenteral


 Nutrition/Amino


 Acids/Dextrose/


 Fat Emulsion


 Intravenous  1,920 ml @ 


 80 mls/hr  TPN  CONT  6/15/20 22:00


 6/16/20 19:34 DC 6/15/20 21:54


80 MLS/HR


 


 Potassium Acetate


 55 meq/Magnesium


 Sulfate 20 meq/


 Calcium Gluconate


 10 meq/


 Multivitamins 10


 ml/Chromium/


 Copper/Manganese/


 Seleni/Zn 0.5 ml/


 Insulin Human


 Regular 30 unit/


 Total Parenteral


 Nutrition/Amino


 Acids/Dextrose/


 Fat Emulsion


 Intravenous  1,920 ml @ 


 80 mls/hr  TPN  CONT  4/30/20 22:00


 5/1/20 21:59 DC 5/1/20 01:00


80 MLS/HR


 


 Potassium Acetate


 55 meq/Magnesium


 Sulfate 20 meq/


 Calcium Gluconate


 10 meq/


 Multivitamins 10


 ml/Chromium/


 Copper/Manganese/


 Seleni/Zn 0.5 ml/


 Insulin Human


 Regular 35 unit/


 Total Parenteral


 Nutrition/Amino


 Acids/Dextrose/


 Fat Emulsion


 Intravenous  1,920 ml @ 


 80 mls/hr  TPN  CONT  4/28/20 22:00


 4/29/20 21:59 DC 4/28/20 22:02


80 MLS/HR


 


 Potassium Acetate


 60 meq/Magnesium


 Sulfate 10 meq/


 Multivitamins 10


 ml/Chromium/


 Copper/Manganese/


 Seleni/Zn 1 ml/


 Insulin Human


 Regular 20 unit/


 Total Parenteral


 Nutrition/Amino


 Acids/Dextrose/


 Fat Emulsion


 Intravenous  1,920 ml @ 


 80 mls/hr  TPN  CONT  6/17/20 22:00


 6/18/20 21:59 DC 6/17/20 21:55


80 MLS/HR


 


 Potassium Acetate


 60 meq/Magnesium


 Sulfate 14 meq/


 Multivitamins 10


 ml/Chromium/


 Copper/Manganese/


 Seleni/Zn 1 ml/


 Insulin Human


 Regular 15 unit/


 Sodium Chloride


 20 meq/Total


 Parenteral


 Nutrition/Amino


 Acids/Dextrose/


 Fat Emulsion


 Intravenous  1,920 ml @ 


 80 mls/hr  TPN  CONT  6/22/20 22:00


 6/23/20 21:59 DC 6/22/20 21:54


80 MLS/HR


 


 Potassium Acetate


 60 meq/Magnesium


 Sulfate 14 meq/


 Multivitamins 10


 ml/Chromium/


 Copper/Manganese/


 Seleni/Zn 1 ml/


 Insulin Human


 Regular 15 unit/


 Total Parenteral


 Nutrition/Amino


 Acids/Dextrose/


 Fat Emulsion


 Intravenous  1,920 ml @ 


 80 mls/hr  TPN  CONT  6/21/20 22:00


 6/22/20 21:59 DC 6/21/20 22:22


80 MLS/HR


 


 Potassium Acetate


 60 meq/Magnesium


 Sulfate 14 meq/


 Multivitamins 10


 ml/Chromium/


 Copper/Manganese/


 Seleni/Zn 1 ml/


 Insulin Human


 Regular 20 unit/


 Total Parenteral


 Nutrition/Amino


 Acids/Dextrose/


 Fat Emulsion


 Intravenous  1,920 ml @ 


 80 mls/hr  TPN  CONT  6/18/20 22:00


 6/19/20 21:59 DC 6/18/20 22:26


80 MLS/HR


 


 Potassium Acetate


 60 meq/Magnesium


 Sulfate 5 meq/


 Multivitamins 10


 ml/Chromium/


 Copper/Manganese/


 Seleni/Zn 1 ml/


 Insulin Human


 Regular 30 unit/


 Total Parenteral


 Nutrition/Amino


 Acids/Dextrose/


 Fat Emulsion


 Intravenous  1,920 ml @ 


 80 mls/hr  TPN  CONT  6/6/20 22:00


 6/7/20 21:59 DC 6/6/20 21:54


80 MLS/HR


 


 Potassium Acetate


 65 meq/Magnesium


 Sulfate 20 meq/


 Calcium Gluconate


 10 meq/


 Multivitamins 10


 ml/Chromium/


 Copper/Manganese/


 Seleni/Zn 0.5 ml/


 Insulin Human


 Regular 30 unit/


 Total Parenteral


 Nutrition/Amino


 Acids/Dextrose/


 Fat Emulsion


 Intravenous  1,920 ml @ 


 80 mls/hr  TPN  CONT  4/29/20 22:00


 4/30/20 21:59 DC 4/29/20 22:22


80 MLS/HR


 


 Potassium Acetate


 80 meq/Magnesium


 Sulfate 5 meq/


 Multivitamins 10


 ml/Chromium/


 Copper/Manganese/


 Seleni/Zn 1 ml/


 Insulin Human


 Regular 20 unit/


 Total Parenteral


 Nutrition/Amino


 Acids/Dextrose/


 Fat Emulsion


 Intravenous  1,920 ml @ 


 80 mls/hr  TPN  CONT  6/5/20 22:00


 6/6/20 21:59 DC 6/5/20 21:59


80 MLS/HR


 


 Prochlorperazine


 Edisylate


  (Compazine)  5 mg  PACU PRN  PRN  4/27/20 07:00


 4/28/20 06:59 DC  





 


 Propofol  100 ml @ 0


 mls/hr  CONT  PRN  6/13/20 06:00


    6/20/20 23:50


2.7 MLS/HR


 


 Ringer's Solution  1,000 ml @ 


 30 mls/hr  Q24H  4/27/20 07:00


 4/27/20 18:59 DC  





 


 Rocuronium Bromide


  (Zemuron)  50 mg  STK-MED ONCE  4/27/20 10:56


 4/27/20 10:57 DC  





 


 Saliva Substitute


  (Biotene


 Moisturizing


 Mouth)  2 spray  PRN Q15MIN  PRN  5/21/20 11:00


     





 


 Sevoflurane


  (Ultane)  60 ml  STK-MED ONCE  4/27/20 12:26


 4/27/20 12:27 DC  





 


 Sodium


 Bicarbonate 50


 meq/Sodium


 Chloride  1,050 ml @ 


 75 mls/hr  Q14H  3/18/20 07:30


 3/23/20 10:28 DC 3/22/20 21:10


75 MLS/HR


 


 Sodium Acetate 50


 meq/Potassium


 Acetate 55 meq/


 Magnesium Sulfate


 20 meq/Calcium


 Gluconate 10 meq/


 Multivitamins 10


 ml/Chromium/


 Copper/Manganese/


 Seleni/Zn 0.5 ml/


 Insulin Human


 Regular 35 unit/


 Total Parenteral


 Nutrition/Amino


 Acids/Dextrose/


 Fat Emulsion


 Intravenous  1,800 ml @ 


 75 mls/hr  TPN  CONT  4/25/20 22:00


 4/26/20 21:59 DC 4/25/20 22:03


75 MLS/HR


 


 Sodium Bicarbonate


  (Sodium Bicarb


 Adult 8.4% Syr)  50 meq  1X  ONCE  6/7/20 22:00


 6/7/20 22:01 DC 6/7/20 21:47


50 MEQ


 


 Sodium Chloride


  (Normal Saline


 Flush)  3 ml  QSHIFT  PRN  4/27/20 13:45


     





 


 Sodium Chloride


 80 meq/Potassium


 Chloride 30 meq/


 Potassium Acetate


 30 meq/Magnesium


 Sulfate 14 meq/


 Multivitamins 10


 ml/Chromium/


 Copper/Manganese/


 Seleni/Zn 1 ml/


 Insulin Human


 Regular 15 unit/


 Total Parenteral


 Nutrition/Amino


 Acids/Dextrose/


 Fat Emulsion


 Intravenous  1,920 ml @ 


 80 mls/hr  TPN  CONT  6/27/20 22:00


 6/28/20 21:59  6/27/20 21:53


80 MLS/HR


 


 Sodium Chloride


 90 meq/Calcium


 Gluconate 10 meq/


 Multivitamins 10


 ml/Chromium/


 Copper/Manganese/


 Seleni/Zn 0.5 ml/


 Total Parenteral


 Nutrition/Amino


 Acids/Dextrose/


 Fat Emulsion


 Intravenous  1,512 ml @ 


 63 mls/hr  TPN  CONT  3/18/20 22:00


 3/19/20 21:59 DC 3/18/20 22:06


63 MLS/HR


 


 Sodium Chloride


 90 meq/Calcium


 Gluconate 10 meq/


 Multivitamins 10


 ml/Chromium/


 Copper/Manganese/


 Seleni/Zn 1 ml/


 Total Parenteral


 Nutrition/Amino


 Acids/Dextrose/


 Fat Emulsion


 Intravenous  55.005 ml


  @ 2.292


 mls/hr  TPN  CONT  3/18/20 22:00


 3/18/20 12:33 DC  





 


 Sodium Chloride


 90 meq/Magnesium


 Sulfate 10 meq/


 Calcium Gluconate


 20 meq/


 Multivitamins 10


 ml/Chromium/


 Copper/Manganese/


 Seleni/Zn 0.5 ml/


 Total Parenteral


 Nutrition/Amino


 Acids/Dextrose/


 Fat Emulsion


 Intravenous  1,512 ml @ 


 63 mls/hr  TPN  CONT  3/19/20 22:00


 3/20/20 21:59 DC 3/19/20 22:25


63 MLS/HR


 


 Sodium Chloride


 90 meq/Magnesium


 Sulfate 12 meq/


 Calcium Gluconate


 15 meq/


 Multivitamins 10


 ml/Chromium/


 Copper/Manganese/


 Seleni/Zn 0.5 ml/


 Insulin Human


 Regular 25 unit/


 Total Parenteral


 Nutrition/Amino


 Acids/Dextrose/


 Fat Emulsion


 Intravenous  1,400 ml @ 


 58.333 mls/


 hr  TPN  CONT  4/8/20 22:00


 4/9/20 21:59 DC 4/8/20 21:41


58.333 MLS/HR


 


 Sodium Chloride


 90 meq/Potassium


 Chloride 15 meq/


 Magnesium Sulfate


 12 meq/Calcium


 Gluconate 15 meq/


 Multivitamins 10


 ml/Chromium/


 Copper/Manganese/


 Seleni/Zn 0.5 ml/


 Insulin Human


 Regular 25 unit/


 Total Parenteral


 Nutrition/Amino


 Acids/Dextrose/


 Fat Emulsion


 Intravenous  1,400 ml @ 


 58.333 mls/


 hr  TPN  CONT  4/7/20 22:00


 4/8/20 21:59 DC 4/7/20 22:13


58.333 MLS/HR


 


 Sodium Chloride


 90 meq/Potassium


 Chloride 15 meq/


 Potassium


 Phosphate 10 mmol/


 Magnesium Sulfate


 8 meq/Calcium


 Gluconate 15 meq/


 Multivitamins 10


 ml/Chromium/


 Copper/Manganese/


 Seleni/Zn 0.5 ml/


 Insulin Human


 Regular 25 unit/


 Total Parenteral


 Nutrition/Amino


 Acids/Dextrose/


 Fat Emulsion


 Intravenous  1,400 ml @ 


 58.333 mls/


 hr  TPN  CONT  4/5/20 22:00


 4/6/20 21:59 DC 4/5/20 21:20


58.333 MLS/HR


 


 Sodium Chloride


 90 meq/Potassium


 Chloride 15 meq/


 Potassium


 Phosphate 10 mmol/


 Magnesium Sulfate


 10 meq/Calcium


 Gluconate 20 meq/


 Multivitamins 10


 ml/Chromium/


 Copper/Manganese/


 Seleni/Zn 0.5 ml/


 Total Parenteral


 Nutrition/Amino


 Acids/Dextrose/


 Fat Emulsion


 Intravenous  1,400 ml @ 


 58.333 mls/


 hr  TPN  CONT  3/23/20 22:00


 3/24/20 21:59 DC 3/23/20 21:42


58.333 MLS/HR


 


 Sodium Chloride


 90 meq/Potassium


 Chloride 15 meq/


 Potassium


 Phosphate 10 mmol/


 Magnesium Sulfate


 12 meq/Calcium


 Gluconate 15 meq/


 Multivitamins 10


 ml/Chromium/


 Copper/Manganese/


 Seleni/Zn 0.5 ml/


 Insulin Human


 Regular 25 unit/


 Total Parenteral


 Nutrition/Amino


 Acids/Dextrose/


 Fat Emulsion


 Intravenous  1,400 ml @ 


 58.333 mls/


 hr  TPN  CONT  4/6/20 22:00


 4/7/20 21:59 DC 4/6/20 22:24


58.333 MLS/HR


 


 Sodium Chloride


 90 meq/Potassium


 Chloride 15 meq/


 Potassium


 Phosphate 15 mmol/


 Magnesium Sulfate


 10 meq/Calcium


 Gluconate 15 meq/


 Multivitamins 10


 ml/Chromium/


 Copper/Manganese/


 Seleni/Zn 0.5 ml/


 Total Parenteral


 Nutrition/Amino


 Acids/Dextrose/


 Fat Emulsion


 Intravenous  1,400 ml @ 


 58.333 mls/


 hr  TPN  CONT  3/24/20 22:00


 3/25/20 21:59 DC 3/24/20 22:17


58.333 MLS/HR


 


 Sodium Chloride


 90 meq/Potassium


 Chloride 15 meq/


 Potassium


 Phosphate 15 mmol/


 Magnesium Sulfate


 10 meq/Calcium


 Gluconate 20 meq/


 Multivitamins 10


 ml/Chromium/


 Copper/Manganese/


 Seleni/Zn 0.5 ml/


 Total Parenteral


 Nutrition/Amino


 Acids/Dextrose/


 Fat Emulsion


 Intravenous  1,200 ml @ 


 50 mls/hr  TPN  CONT  3/22/20 22:00


 3/22/20 14:17 DC  





 


 Sodium Chloride


 90 meq/Potassium


 Chloride 15 meq/


 Potassium


 Phosphate 18 mmol/


 Magnesium Sulfate


 8 meq/Calcium


 Gluconate 15 meq/


 Multivitamins 10


 ml/Chromium/


 Copper/Manganese/


 Seleni/Zn 0.5 ml/


 Insulin Human


 Regular 10 unit/


 Total Parenteral


 Nutrition/Amino


 Acids/Dextrose/


 Fat Emulsion


 Intravenous  1,400 ml @ 


 58.333 mls/


 hr  TPN  CONT  3/27/20 22:00


 3/28/20 21:59 DC 3/27/20 21:43


58.333 MLS/HR


 


 Sodium Chloride


 90 meq/Potassium


 Chloride 15 meq/


 Potassium


 Phosphate 18 mmol/


 Magnesium Sulfate


 8 meq/Calcium


 Gluconate 15 meq/


 Multivitamins 10


 ml/Chromium/


 Copper/Manganese/


 Seleni/Zn 0.5 ml/


 Insulin Human


 Regular 15 unit/


 Total Parenteral


 Nutrition/Amino


 Acids/Dextrose/


 Fat Emulsion


 Intravenous  1,400 ml @ 


 58.333 mls/


 hr  TPN  CONT  3/30/20 22:00


 3/31/20 21:59 DC 3/30/20 21:47


58.333 MLS/HR


 


 Sodium Chloride


 90 meq/Potassium


 Chloride 15 meq/


 Potassium


 Phosphate 18 mmol/


 Magnesium Sulfate


 8 meq/Calcium


 Gluconate 15 meq/


 Multivitamins 10


 ml/Chromium/


 Copper/Manganese/


 Seleni/Zn 0.5 ml/


 Insulin Human


 Regular 20 unit/


 Total Parenteral


 Nutrition/Amino


 Acids/Dextrose/


 Fat Emulsion


 Intravenous  1,400 ml @ 


 58.333 mls/


 hr  TPN  CONT  4/2/20 22:00


 4/3/20 21:59 DC 4/2/20 22:45


58.333 MLS/HR


 


 Sodium Chloride


 90 meq/Potassium


 Chloride 15 meq/


 Potassium


 Phosphate 18 mmol/


 Magnesium Sulfate


 8 meq/Calcium


 Gluconate 15 meq/


 Multivitamins 10


 ml/Chromium/


 Copper/Manganese/


 Seleni/Zn 0.5 ml/


 Total Parenteral


 Nutrition/Amino


 Acids/Dextrose/


 Fat Emulsion


 Intravenous  1,400 ml @ 


 58.333 mls/


 hr  TPN  CONT  3/26/20 22:00


 3/27/20 21:59 DC 3/26/20 22:00


58.333 MLS/HR


 


 Sodium Chloride


 90 meq/Potassium


 Phosphate 15 mmol/


 Magnesium Sulfate


 12 meq/Calcium


 Gluconate 15 meq/


 Multivitamins 10


 ml/Chromium/


 Copper/Manganese/


 Seleni/Zn 0.5 ml/


 Insulin Human


 Regular 30 unit/


 Total Parenteral


 Nutrition/Amino


 Acids/Dextrose/


 Fat Emulsion


 Intravenous  1,400 ml @ 


 58.333 mls/


 hr  TPN  CONT  4/10/20 22:00


 4/11/20 21:59 DC 4/10/20 21:49


58.333 MLS/HR


 


 Sodium Chloride


 90 meq/Potassium


 Phosphate 15 mmol/


 Magnesium Sulfate


 12 meq/Calcium


 Gluconate 15 meq/


 Multivitamins 10


 ml/Chromium/


 Copper/Manganese/


 Seleni/Zn 0.5 ml/


 Insulin Human


 Regular 40 unit/


 Total Parenteral


 Nutrition/Amino


 Acids/Dextrose/


 Fat Emulsion


 Intravenous  1,400 ml @ 


 58.333 mls/


 hr  TPN  CONT  4/11/20 22:00


 4/12/20 21:59 DC 4/11/20 21:21


58.333 MLS/HR


 


 Sodium Chloride


 90 meq/Potassium


 Phosphate 19 mmol/


 Magnesium Sulfate


 12 meq/Calcium


 Gluconate 15 meq/


 Multivitamins 10


 ml/Chromium/


 Copper/Manganese/


 Seleni/Zn 0.5 ml/


 Insulin Human


 Regular 40 unit/


 Total Parenteral


 Nutrition/Amino


 Acids/Dextrose/


 Fat Emulsion


 Intravenous  1,400 ml @ 


 58.333 mls/


 hr  TPN  CONT  4/12/20 22:00


 4/13/20 21:59 DC 4/12/20 21:54


58.333 MLS/HR


 


 Sodium Chloride


 90 meq/Potassium


 Phosphate 5 mmol/


 Magnesium Sulfate


 12 meq/Calcium


 Gluconate 15 meq/


 Multivitamins 10


 ml/Chromium/


 Copper/Manganese/


 Seleni/Zn 0.5 ml/


 Insulin Human


 Regular 30 unit/


 Total Parenteral


 Nutrition/Amino


 Acids/Dextrose/


 Fat Emulsion


 Intravenous  1,400 ml @ 


 58.333 mls/


 hr  TPN  CONT  4/9/20 22:00


 4/10/20 21:59 DC 4/9/20 22:08


58.333 MLS/HR


 


 Sodium Chloride


 100 meq/Potassium


 Chloride 40 meq/


 Magnesium Sulfate


 15 meq/Calcium


 Gluconate 15 meq/


 Multivitamins 10


 ml/Chromium/


 Copper/Manganese/


 Seleni/Zn 0.5 ml/


 Insulin Human


 Regular 35 unit/


 Total Parenteral


 Nutrition/Amino


 Acids/Dextrose/


 Fat Emulsion


 Intravenous  1,400 ml @ 


 58.333 mls/


 hr  TPN  CONT  4/19/20 22:00


 4/20/20 21:59 DC 4/19/20 22:46


58.333 MLS/HR


 


 Sodium Chloride


 100 meq/Potassium


 Chloride 40 meq/


 Magnesium Sulfate


 20 meq/Calcium


 Gluconate 10 meq/


 Multivitamins 10


 ml/Chromium/


 Copper/Manganese/


 Seleni/Zn 0.5 ml/


 Insulin Human


 Regular 35 unit/


 Total Parenteral


 Nutrition/Amino


 Acids/Dextrose/


 Fat Emulsion


 Intravenous  1,400 ml @ 


 58.333 mls/


 hr  TPN  CONT  4/23/20 22:00


 4/24/20 21:59 DC 4/24/20 00:06


58.333 MLS/HR


 


 Sodium Chloride


 100 meq/Potassium


 Chloride 40 meq/


 Magnesium Sulfate


 20 meq/Calcium


 Gluconate 15 meq/


 Multivitamins 10


 ml/Chromium/


 Copper/Manganese/


 Seleni/Zn 0.5 ml/


 Insulin Human


 Regular 35 unit/


 Total Parenteral


 Nutrition/Amino


 Acids/Dextrose/


 Fat Emulsion


 Intravenous  1,400 ml @ 


 58.333 mls/


 hr  TPN  CONT  4/22/20 22:00


 4/23/20 21:59 DC 4/22/20 22:27


58.333 MLS/HR


 


 Sodium Chloride


 100 meq/Potassium


 Phosphate 10 mmol/


 Magnesium Sulfate


 12 meq/Calcium


 Gluconate 15 meq/


 Multivitamins 10


 ml/Chromium/


 Copper/Manganese/


 Seleni/Zn 0.5 ml/


 Insulin Human


 Regular 35 unit/


 Potassium


 Chloride 20 meq/


 Total Parenteral


 Nutrition/Amino


 Acids/Dextrose/


 Fat Emulsion


 Intravenous  1,400 ml @ 


 58.333 mls/


 hr  TPN  CONT  4/16/20 22:00


 4/17/20 21:59 DC 4/16/20 22:10


58.333 MLS/HR


 


 Sodium Chloride


 100 meq/Potassium


 Phosphate 19 mmol/


 Magnesium Sulfate


 12 meq/Calcium


 Gluconate 15 meq/


 Multivitamins 10


 ml/Chromium/


 Copper/Manganese/


 Seleni/Zn 0.5 ml/


 Insulin Human


 Regular 40 unit/


 Potassium


 Chloride 20 meq/


 Total Parenteral


 Nutrition/Amino


 Acids/Dextrose/


 Fat Emulsion


 Intravenous  1,400 ml @ 


 58.333 mls/


 hr  TPN  CONT  4/15/20 22:00


 4/16/20 21:59 DC 4/15/20 21:20


58.333 MLS/HR


 


 Sodium Chloride


 100 meq/Potassium


 Phosphate 5 mmol/


 Magnesium Sulfate


 12 meq/Calcium


 Gluconate 15 meq/


 Multivitamins 10


 ml/Chromium/


 Copper/Manganese/


 Seleni/Zn 0.5 ml/


 Insulin Human


 Regular 35 unit/


 Potassium


 Chloride 20 meq/


 Total Parenteral


 Nutrition/Amino


 Acids/Dextrose/


 Fat Emulsion


 Intravenous  1,400 ml @ 


 58.333 mls/


 hr  TPN  CONT  4/17/20 22:00


 4/18/20 21:59 DC 4/17/20 22:59


58.333 MLS/HR


 


 Succinylcholine


 Chloride


  (Anectine)  120 mg  1X  ONCE  3/23/20 08:30


 3/23/20 08:31 DC 3/23/20 08:34


120 MG


 


 Vecuronium Bromide


  (Norcuron Bolus)  6 mg  PRN Q6HRS  PRN  5/7/20 19:15


 5/7/20 19:35 DC  














Labs:


Lab





Laboratory Tests








Test


 6/27/20


12:15 6/27/20


17:30 6/28/20


00:30 6/28/20


06:06


 


O2 Saturation 95 % (92-99)    


 


Arterial Blood pH


 7.41


(7.35-7.45) 


 


 





 


Arterial Blood pCO2 at


Patient Temp 54 mmHg


(35-46) 


 


 





 


Arterial Blood pO2 at Patient


Temp 79 mmHg


() 


 


 





 


Arterial Blood HCO3


 34 mmol/L


(21-28) 


 


 





 


Arterial Blood Base Excess


 8 mmol/L


(-3-3) 


 


 





 


FiO2 33    


 


Glucose (Fingerstick)


 


 135 mg/dL


(70-99) 121 mg/dL


(70-99) 128 mg/dL


(70-99)








Micro


        NEG ANSON 56


        PSEUDOMONAS AERUGINOSA


        ANTIBIOTIC                        RESULT          INTERPRETATION


        AMIKACIN                          <=16                  S


        AZTREONAM                         >16                   R


        CEFTAZIDIME                       >16                   R


        CIPROFLOXACIN                     <=0.25                S


        CEFEPIME                          16                    I


        GENTAMICIN                        <=2                   S


        LEVOFLOXACIN                      <=0.5                 S














                               ** CONTINUED ON NEXT PAGE **





--------------------------

------------------------------------------------------------------





RUN DATE: 06/10/20                  [facility] *LIVE*               

  PAGE 2   


RUN TIME: 1121                            Specimen Inquiry                    


----------------

----------------------------------------------------------------------------





SPEC: [id number]    PATIENT: JESENIA BEAN                [id number]  

(Continued)


 

--------------------------------------------------------------------------------


------------








 

--------------------------------------------------------------------------------


------------





  Procedure                         Result                                      

         


-----------------------------------------------------------------

---------------------------





  ANTIMICROBIAL SUSCEPTIBILITY  Preliminary   (continued)


        MEROPENEM                         <=1                   S


        PIPERACILLIN/TAZOBACTAM           64                    S


        TOBRAMYCIN                        <=2                   S


        Unless otherwise specified, Testing Performed by:


        [address] [place] [address]


        For Inquires, the Physician may contact the Microbiology


        department at [phone]





 

--------------------------------------------------------------------------------


------------





Objective:


Assessment:


Patient with prolonged hospitalization more than 3 months


Multiple medical problems


Multiple surgical procedures





 





Fever intermittent


Acute pancreatitis with persistent  necrosis


CT a/p 4/9


    Increased ascites. Persistent evidence of necrotizing pancreatitis with 

fluid and phlegmon


   at the pancreas


   4/27 status post ROBERT drain placement; yeast


   5/6 fluid  candida parapsilosis fluid amylase high


CT 6/7





1.   Sequela of pancreatitis with extensive pseudocysts again 


demonstrated, the right-sided collections are slightly larger since the 


prior exam, the left-sided collections are stable. 


6/7 fluid cult PSAE (MDRO),yeast; treated





Cholelithiasis with thickening of the gallbladder wall.


Leucocytosis 


JED,Hyperkalemia, Metabolic acidosis off dialysis


Acute hypoxic resp failure ,bilateral pleural effusion and atelectasis


hypocalcemia 


Prediabetes


HTN


s/p trach


Pleural effusion status post CTS left side


 


Abdominal fluid culture 6 /7 MDRO Pseudomonas, yeast





Plan:


Plan of Care


Observe off antibiotics


Monitor labs/temp


General surgery following


Monitor drain output


Maintain aspiration precaution


Supportive care


Contact isolation for CRE/MDRO





Discussed with nursing staff











IVAN FRANZ MD           Jun 28, 2020 07:57

## 2020-06-28 NOTE — NUR
Turned patient to change dressings, underneath patient was saturated with serosanguineous 
fluid that had a foul odor. Dressings were changed, patient tolerated well. Underneath 
patient on coccyx and back, patient appeared slightly mottled, skin was blanchable. Patient 
began to get slightly tachycardic, checked temperature and it was 101.3, removed blankets 
and placed fan on patient. Paged Dr. LEANDRO Davis (ID), awaiting call back. Will continue to 
monitor.

## 2020-06-28 NOTE — RAD
Single view chest dated 6/28/2020.

 

COMPARISON: 6/25/2020.

 

CLINICAL INDICATION: Follow-up pleural effusion.

 

FINDINGS:

 

Single upright portable exam performed. Tracheostomy tube, nasogastric 

tube, left-sided PICC and left-sided chest tube in place, unchanged. Heart

size is mildly enlarged, stable.

 

Lung volumes are low, limiting evaluation. There is consolidation at both 

lung bases with blunting of the costophrenic sulci, not significantly 

changed given differences in technique. No pneumothorax.

 

IMPRESSION:

 

No significant interval change compared to 6/25/2020.

 

Electronically signed by: Mihir Clinton MD (6/28/2020 8:21 AM) 

[id number]

## 2020-06-29 VITALS — DIASTOLIC BLOOD PRESSURE: 64 MMHG | SYSTOLIC BLOOD PRESSURE: 109 MMHG

## 2020-06-29 VITALS — DIASTOLIC BLOOD PRESSURE: 88 MMHG | SYSTOLIC BLOOD PRESSURE: 133 MMHG

## 2020-06-29 VITALS — DIASTOLIC BLOOD PRESSURE: 77 MMHG | SYSTOLIC BLOOD PRESSURE: 120 MMHG

## 2020-06-29 VITALS — DIASTOLIC BLOOD PRESSURE: 69 MMHG | SYSTOLIC BLOOD PRESSURE: 104 MMHG

## 2020-06-29 VITALS — DIASTOLIC BLOOD PRESSURE: 68 MMHG | SYSTOLIC BLOOD PRESSURE: 131 MMHG

## 2020-06-29 VITALS — DIASTOLIC BLOOD PRESSURE: 75 MMHG | SYSTOLIC BLOOD PRESSURE: 128 MMHG

## 2020-06-29 VITALS — DIASTOLIC BLOOD PRESSURE: 72 MMHG | SYSTOLIC BLOOD PRESSURE: 114 MMHG

## 2020-06-29 VITALS — DIASTOLIC BLOOD PRESSURE: 79 MMHG | SYSTOLIC BLOOD PRESSURE: 134 MMHG

## 2020-06-29 VITALS — SYSTOLIC BLOOD PRESSURE: 110 MMHG | DIASTOLIC BLOOD PRESSURE: 65 MMHG

## 2020-06-29 VITALS — SYSTOLIC BLOOD PRESSURE: 116 MMHG | DIASTOLIC BLOOD PRESSURE: 72 MMHG

## 2020-06-29 VITALS — DIASTOLIC BLOOD PRESSURE: 55 MMHG | SYSTOLIC BLOOD PRESSURE: 112 MMHG

## 2020-06-29 VITALS — SYSTOLIC BLOOD PRESSURE: 113 MMHG | DIASTOLIC BLOOD PRESSURE: 79 MMHG

## 2020-06-29 VITALS — DIASTOLIC BLOOD PRESSURE: 73 MMHG | SYSTOLIC BLOOD PRESSURE: 119 MMHG

## 2020-06-29 VITALS — DIASTOLIC BLOOD PRESSURE: 64 MMHG | SYSTOLIC BLOOD PRESSURE: 102 MMHG

## 2020-06-29 VITALS — SYSTOLIC BLOOD PRESSURE: 107 MMHG | DIASTOLIC BLOOD PRESSURE: 62 MMHG

## 2020-06-29 VITALS — SYSTOLIC BLOOD PRESSURE: 109 MMHG | DIASTOLIC BLOOD PRESSURE: 65 MMHG

## 2020-06-29 VITALS — SYSTOLIC BLOOD PRESSURE: 86 MMHG | DIASTOLIC BLOOD PRESSURE: 64 MMHG

## 2020-06-29 VITALS — DIASTOLIC BLOOD PRESSURE: 89 MMHG | SYSTOLIC BLOOD PRESSURE: 119 MMHG

## 2020-06-29 VITALS — DIASTOLIC BLOOD PRESSURE: 60 MMHG | SYSTOLIC BLOOD PRESSURE: 102 MMHG

## 2020-06-29 VITALS — DIASTOLIC BLOOD PRESSURE: 64 MMHG | SYSTOLIC BLOOD PRESSURE: 104 MMHG

## 2020-06-29 VITALS — DIASTOLIC BLOOD PRESSURE: 73 MMHG | SYSTOLIC BLOOD PRESSURE: 121 MMHG

## 2020-06-29 VITALS — DIASTOLIC BLOOD PRESSURE: 64 MMHG | SYSTOLIC BLOOD PRESSURE: 121 MMHG

## 2020-06-29 VITALS — DIASTOLIC BLOOD PRESSURE: 73 MMHG | SYSTOLIC BLOOD PRESSURE: 123 MMHG

## 2020-06-29 VITALS — DIASTOLIC BLOOD PRESSURE: 68 MMHG | SYSTOLIC BLOOD PRESSURE: 113 MMHG

## 2020-06-29 LAB
ANION GAP SERPL CALC-SCNC: 1 MMOL/L (ref 6–14)
BUN SERPL-MCNC: 16 MG/DL (ref 7–20)
CALCIUM SERPL-MCNC: 10.7 MG/DL (ref 8.5–10.1)
CHLORIDE SERPL-SCNC: 101 MMOL/L (ref 98–107)
CO2 SERPL-SCNC: 35 MMOL/L (ref 21–32)
CREAT SERPL-MCNC: 0.7 MG/DL (ref 0.6–1)
GFR SERPLBLD BASED ON 1.73 SQ M-ARVRAT: 88.9 ML/MIN
GLUCOSE SERPL-MCNC: 129 MG/DL (ref 70–99)
POTASSIUM SERPL-SCNC: 4.4 MMOL/L (ref 3.5–5.1)
SODIUM SERPL-SCNC: 137 MMOL/L (ref 136–145)

## 2020-06-29 RX ADMIN — INSULIN LISPRO SCH UNITS: 100 INJECTION, SOLUTION INTRAVENOUS; SUBCUTANEOUS at 18:00

## 2020-06-29 RX ADMIN — MEROPENEM SCH MLS/HR: 500 INJECTION, POWDER, FOR SOLUTION INTRAVENOUS at 00:10

## 2020-06-29 RX ADMIN — IPRATROPIUM BROMIDE AND ALBUTEROL SULFATE SCH ML: .5; 3 SOLUTION RESPIRATORY (INHALATION) at 04:15

## 2020-06-29 RX ADMIN — MEROPENEM SCH MLS/HR: 500 INJECTION, POWDER, FOR SOLUTION INTRAVENOUS at 12:19

## 2020-06-29 RX ADMIN — MEROPENEM SCH MLS/HR: 500 INJECTION, POWDER, FOR SOLUTION INTRAVENOUS at 05:54

## 2020-06-29 RX ADMIN — ACETYLCYSTEINE SCH MG: 200 INHALANT RESPIRATORY (INHALATION) at 11:50

## 2020-06-29 RX ADMIN — ACETAMINOPHEN PRN MG: 650 SUPPOSITORY RECTAL at 18:16

## 2020-06-29 RX ADMIN — INSULIN LISPRO SCH UNITS: 100 INJECTION, SOLUTION INTRAVENOUS; SUBCUTANEOUS at 05:54

## 2020-06-29 RX ADMIN — PANTOPRAZOLE SODIUM SCH MG: 40 INJECTION, POWDER, FOR SOLUTION INTRAVENOUS at 09:03

## 2020-06-29 RX ADMIN — IPRATROPIUM BROMIDE AND ALBUTEROL SULFATE SCH ML: .5; 3 SOLUTION RESPIRATORY (INHALATION) at 20:15

## 2020-06-29 RX ADMIN — MEROPENEM SCH MLS/HR: 500 INJECTION, POWDER, FOR SOLUTION INTRAVENOUS at 18:16

## 2020-06-29 RX ADMIN — INSULIN LISPRO SCH UNITS: 100 INJECTION, SOLUTION INTRAVENOUS; SUBCUTANEOUS at 12:00

## 2020-06-29 RX ADMIN — IPRATROPIUM BROMIDE AND ALBUTEROL SULFATE SCH ML: .5; 3 SOLUTION RESPIRATORY (INHALATION) at 11:53

## 2020-06-29 RX ADMIN — IPRATROPIUM BROMIDE AND ALBUTEROL SULFATE SCH ML: .5; 3 SOLUTION RESPIRATORY (INHALATION) at 16:22

## 2020-06-29 RX ADMIN — DAPTOMYCIN SCH MLS/HR: 500 INJECTION, POWDER, LYOPHILIZED, FOR SOLUTION INTRAVENOUS at 18:19

## 2020-06-29 RX ADMIN — IPRATROPIUM BROMIDE AND ALBUTEROL SULFATE SCH ML: .5; 3 SOLUTION RESPIRATORY (INHALATION) at 23:43

## 2020-06-29 RX ADMIN — IPRATROPIUM BROMIDE AND ALBUTEROL SULFATE SCH ML: .5; 3 SOLUTION RESPIRATORY (INHALATION) at 08:32

## 2020-06-29 RX ADMIN — ACETYLCYSTEINE SCH MG: 200 INHALANT RESPIRATORY (INHALATION) at 20:15

## 2020-06-29 RX ADMIN — Medication PRN EACH: at 09:33

## 2020-06-29 RX ADMIN — IPRATROPIUM BROMIDE AND ALBUTEROL SULFATE SCH ML: .5; 3 SOLUTION RESPIRATORY (INHALATION) at 00:10

## 2020-06-29 RX ADMIN — INSULIN LISPRO SCH UNITS: 100 INJECTION, SOLUTION INTRAVENOUS; SUBCUTANEOUS at 00:00

## 2020-06-29 NOTE — PDOC
SURGICAL PROGRESS NOTE


Subjective


Pt sleeping comfortably, nursing notes still some issues with confusion.


Vital Signs





Vital Signs








  Date Time  Temp Pulse Resp B/P (MAP) Pulse Ox O2 Delivery O2 Flow Rate FiO2


 


6/29/20 09:00  113 26 104/69 (81) 98 Tracheal Collar  


 


6/29/20 08:33       8.0 


 


6/29/20 08:00 98.0       





 98.0       








I&O











Intake and Output 


 


 6/29/20





 07:00


 


Intake Total 2298 ml


 


Output Total 1670 ml


 


Balance 628 ml


 


 


 


IV Total 2298 ml


 


Output Urine Total 1600 ml


 


Gastric Drainage Total 50 ml


 


Chest Tube Drainage Total 0 ml


 


Drainage Total 20 ml








General:  No acute distress


Labs





Laboratory Tests








Test


 6/27/20


12:15 6/27/20


17:30 6/28/20


00:30 6/28/20


06:06


 


O2 Saturation 95 % (92-99)    


 


Arterial Blood pH


 7.41


(7.35-7.45) 


 


 





 


Arterial Blood pCO2 at


Patient Temp 54 mmHg


(35-46) 


 


 





 


Arterial Blood pO2 at Patient


Temp 79 mmHg


() 


 


 





 


Arterial Blood HCO3


 34 mmol/L


(21-28) 


 


 





 


Arterial Blood Base Excess


 8 mmol/L


(-3-3) 


 


 





 


FiO2 33    


 


Glucose (Fingerstick)


 


 135 mg/dL


(70-99) 121 mg/dL


(70-99) 128 mg/dL


(70-99)


 


Test


 6/28/20


08:15 6/28/20


08:45 6/28/20


11:52 6/29/20


00:09


 


White Blood Count


 12.4 x10^3/uL


(4.0-11.0) 


 


 





 


Red Blood Count


 3.06 x10^6/uL


(3.50-5.40) 


 


 





 


Hemoglobin


 8.5 g/dL


(12.0-15.5) 


 


 





 


Hematocrit


 26.0 %


(36.0-47.0) 


 


 





 


Mean Corpuscular Volume 85 fL ()    


 


Mean Corpuscular Hemoglobin 28 pg (25-35)    


 


Mean Corpuscular Hemoglobin


Concent 33 g/dL


(31-37) 


 


 





 


Red Cell Distribution Width


 17.7 %


(11.5-14.5) 


 


 





 


Platelet Count


 394 x10^3/uL


(140-400) 


 


 





 


Neutrophils (%) (Auto) 78 % (31-73)    


 


Lymphocytes (%) (Auto) 12 % (24-48)    


 


Monocytes (%) (Auto) 8 % (0-9)    


 


Eosinophils (%) (Auto) 2 % (0-3)    


 


Basophils (%) (Auto) 0 % (0-3)    


 


Neutrophils # (Auto)


 9.7 x10^3/uL


(1.8-7.7) 


 


 





 


Lymphocytes # (Auto)


 1.4 x10^3/uL


(1.0-4.8) 


 


 





 


Monocytes # (Auto)


 1.0 x10^3/uL


(0.0-1.1) 


 


 





 


Eosinophils # (Auto)


 0.2 x10^3/uL


(0.0-0.7) 


 


 





 


Basophils # (Auto)


 0.0 x10^3/uL


(0.0-0.2) 


 


 





 


Sodium Level


 137 mmol/L


(136-145) 


 


 





 


Potassium Level


 4.5 mmol/L


(3.5-5.1) 


 


 





 


Chloride Level


 101 mmol/L


() 


 


 





 


Carbon Dioxide Level


 35 mmol/L


(21-32) 


 


 





 


Anion Gap 1 (6-14)    


 


Blood Urea Nitrogen


 16 mg/dL


(7-20) 


 


 





 


Creatinine


 0.6 mg/dL


(0.6-1.0) 


 


 





 


Estimated GFR


(Cockcroft-Gault) 106.3 


 


 


 





 


Glucose Level


 129 mg/dL


(70-99) 


 


 





 


Calcium Level


 10.5 mg/dL


(8.5-10.1) 


 


 





 


Magnesium Level


 2.0 mg/dL


(1.8-2.4) 


 


 





 


O2 Saturation  97 % (92-99)   


 


Arterial Blood pH


 


 7.41


(7.35-7.45) 


 





 


Arterial Blood pCO2 at


Patient Temp 


 55 mmHg


(35-46) 


 





 


Arterial Blood pO2 at Patient


Temp 


 108 mmHg


() 


 





 


Arterial Blood HCO3


 


 34 mmol/L


(21-28) 


 





 


Arterial Blood Base Excess


 


 8 mmol/L


(-3-3) 


 





 


FiO2  33   


 


Glucose (Fingerstick)


 


 


 128 mg/dL


(70-99) 123 mg/dL


(70-99)


 


Test


 6/29/20


05:53 6/29/20


06:00 


 





 


Glucose (Fingerstick)


 125 mg/dL


(70-99) 


 


 





 


Sodium Level


 


 137 mmol/L


(136-145) 


 





 


Potassium Level


 


 4.4 mmol/L


(3.5-5.1) 


 





 


Chloride Level


 


 101 mmol/L


() 


 





 


Carbon Dioxide Level


 


 35 mmol/L


(21-32) 


 





 


Anion Gap  1 (6-14)   


 


Blood Urea Nitrogen


 


 16 mg/dL


(7-20) 


 





 


Creatinine


 


 0.7 mg/dL


(0.6-1.0) 


 





 


Estimated GFR


(Cockcroft-Gault) 


 88.9 


 


 





 


Glucose Level


 


 129 mg/dL


(70-99) 


 





 


Calcium Level


 


 10.7 mg/dL


(8.5-10.1) 


 











Laboratory Tests








Test


 6/28/20


11:52 6/29/20


00:09 6/29/20


05:53 6/29/20


06:00


 


Glucose (Fingerstick)


 128 mg/dL


(70-99) 123 mg/dL


(70-99) 125 mg/dL


(70-99) 





 


Sodium Level


 


 


 


 137 mmol/L


(136-145)


 


Potassium Level


 


 


 


 4.4 mmol/L


(3.5-5.1)


 


Chloride Level


 


 


 


 101 mmol/L


()


 


Carbon Dioxide Level


 


 


 


 35 mmol/L


(21-32)


 


Anion Gap    1 (6-14) 


 


Blood Urea Nitrogen


 


 


 


 16 mg/dL


(7-20)


 


Creatinine


 


 


 


 0.7 mg/dL


(0.6-1.0)


 


Estimated GFR


(Cockcroft-Gault) 


 


 


 88.9 





 


Glucose Level


 


 


 


 129 mg/dL


(70-99)


 


Calcium Level


 


 


 


 10.7 mg/dL


(8.5-10.1)








Problem List


Problems


Medical Problems:


(1) Acute pancreatitis


Status: Acute  





(2) Cholelithiasis


Status: Acute  








Assessment/Plan


severe pancreatitis


plan OR in AM for necrosectomy, cholecystectomy and feeding tube placement.


R/R/B/A d/w pt's family yesterday.





Justicifation of Admission Dx:


Justifications for Admission:


Justification of Admission Dx:  Yes











GAMAL ZHOU MD             Jun 29, 2020 09:40

## 2020-06-29 NOTE — NUR
SS following up with discharge planning. SS reviewed pt chart and discussed with pt RN. Pt 
scheduled for surgery with Dr. Folres tomorrow. Pt remains on trach collar and TPN. Pt now on 
IV Daptomycin and Meropenem. Pt continues to have drains and chest tube. SS will continue to 
follow for discharge planning.

## 2020-06-29 NOTE — NUR
Pharmacy TPN Dosing Note



S: JESENIA BEAN is a 49 year old F Currently receiving Central Continuous TPN started 
03/18/20



B:Pertinent PMH: 

Necrotizing pancreatitis

Height: 5 feet, 8 inches

Weight: 87.8 kg



Current diet: NPO 



LABS:

Sodium:    137 

Potassium: 4.4 

Chloride:  101 

Calcium:   10.7 

Corrected Calcium: 13.02 

Magnesium: 2 

CO2:       35 

SCr:       0.7 

Glucose:   123-129 

Albumin:   1.1 

AST:       19 

ALT:       14 



TPN FORMULA:

TPN TYPE:  Central Continuous

AMINO ACIDS:         80 gm

DEXTROSE:            250 gm

LIPIDS:              20 gm

SODIUM CHLORIDE:     80 mEq

SODIUM ACETATE:      - mEq

SODIUM PHOSPHATE:    - mmol

POTASSIUM CHLORIDE:  30 mEq

POTASSIUM ACETATE:   30 mEq

POTASSIUM PHOSPHATE: - mmol

MAGNESIUM:           14 mEq

CALCIUM:             - mEq

INSULIN:             15 units

MULTIPLE VITAMIN:    10 ml

TRACE ELEMENTS:      1 ml(s)



TPN PLAN:  

Continue same TPN. Trig/phos/mag in AM.





R: Continue TPN AS ABOVE.

Will monitor electrolytes, glucose, and tolerance to TPN.



 CANDACE BELTRE [facility], 06/29/20 0937

## 2020-06-29 NOTE — PDOC
PROGRESS NOTES


Assessment


Problems


Medical Problems:


(1) Acute pancreatitis


Status: Acute  





(2) Cholelithiasis


Status: Acute  





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


Single seizure on 3/6, no recurrence.


Metabolic encephalopathy.


Fevers.


Metabolic acidosis.


Diffuse pulmonary infiltrate.


Pleural effusion.


Pancreatitis, necrotizing.


Gallstone.


Leukocytosis.


Lymphopenia.


Electrolytes imbalances.


Hyperglycemia.


DM.


HTN.


HLD.


Anemia.


Abnormal CXR.


Obesity.


Ascites and pleural effusion


Ileus with vomiting


Anemia 


JED


Hyperkalemia


Metabolic acidosis


Hypertension


S/P trache, back on vent


Anemia


S/P IR drain placement, 6/7


Hypotension, intermittently requiring Levophed


Plan


Note plans for pancreatic necrosectomy, cholecystectomy with cholangiogram and 

feeling tube.


Keppra if she has further seizures.


Treat medical diseases.


Subjective


Indicates no pain


Objective





Vital Signs








  Date Time  Temp Pulse Resp B/P (MAP) Pulse Ox O2 Delivery O2 Flow Rate FiO2


 


6/29/20 08:00 98.0 119 24 113/79 (90) 99 Tracheal Collar  





 98.0       


 


6/29/20 04:15       8.0 














Intake and Output 


 


 6/29/20





 07:00


 


Intake Total 2298 ml


 


Output Total 1670 ml


 


Balance 628 ml


 


 


 


IV Total 2298 ml


 


Output Urine Total 1600 ml


 


Gastric Drainage Total 50 ml


 


Chest Tube Drainage Total 0 ml


 


Drainage Total 20 ml








PHYSICAL EXAM


Alert, follows commands. Tracheostomy shield.


PERRL.


EOMI.


CN: no focal findings.


Muscle tone: normal.


Muscle strength: 2-3/5


DTR: 1+


Plantar reflex: Silent


Gait: not examined in bed.


Sensory exam: normal


Cerebellar: normal


Review of Relevant


I have reviewed the following items josy (where applicable) has been applied.


Labs





Laboratory Tests








Test


 6/27/20


12:15 6/27/20


17:30 6/28/20


00:30 6/28/20


06:06


 


O2 Saturation 95 % (92-99)    


 


Arterial Blood pH


 7.41


(7.35-7.45) 


 


 





 


Arterial Blood pCO2 at


Patient Temp 54 mmHg


(35-46) 


 


 





 


Arterial Blood pO2 at Patient


Temp 79 mmHg


() 


 


 





 


Arterial Blood HCO3


 34 mmol/L


(21-28) 


 


 





 


Arterial Blood Base Excess


 8 mmol/L


(-3-3) 


 


 





 


FiO2 33    


 


Glucose (Fingerstick)


 


 135 mg/dL


(70-99) 121 mg/dL


(70-99) 128 mg/dL


(70-99)


 


Test


 6/28/20


08:15 6/28/20


08:45 6/28/20


11:52 6/29/20


00:09


 


White Blood Count


 12.4 x10^3/uL


(4.0-11.0) 


 


 





 


Red Blood Count


 3.06 x10^6/uL


(3.50-5.40) 


 


 





 


Hemoglobin


 8.5 g/dL


(12.0-15.5) 


 


 





 


Hematocrit


 26.0 %


(36.0-47.0) 


 


 





 


Mean Corpuscular Volume 85 fL ()    


 


Mean Corpuscular Hemoglobin 28 pg (25-35)    


 


Mean Corpuscular Hemoglobin


Concent 33 g/dL


(31-37) 


 


 





 


Red Cell Distribution Width


 17.7 %


(11.5-14.5) 


 


 





 


Platelet Count


 394 x10^3/uL


(140-400) 


 


 





 


Neutrophils (%) (Auto) 78 % (31-73)    


 


Lymphocytes (%) (Auto) 12 % (24-48)    


 


Monocytes (%) (Auto) 8 % (0-9)    


 


Eosinophils (%) (Auto) 2 % (0-3)    


 


Basophils (%) (Auto) 0 % (0-3)    


 


Neutrophils # (Auto)


 9.7 x10^3/uL


(1.8-7.7) 


 


 





 


Lymphocytes # (Auto)


 1.4 x10^3/uL


(1.0-4.8) 


 


 





 


Monocytes # (Auto)


 1.0 x10^3/uL


(0.0-1.1) 


 


 





 


Eosinophils # (Auto)


 0.2 x10^3/uL


(0.0-0.7) 


 


 





 


Basophils # (Auto)


 0.0 x10^3/uL


(0.0-0.2) 


 


 





 


Sodium Level


 137 mmol/L


(136-145) 


 


 





 


Potassium Level


 4.5 mmol/L


(3.5-5.1) 


 


 





 


Chloride Level


 101 mmol/L


() 


 


 





 


Carbon Dioxide Level


 35 mmol/L


(21-32) 


 


 





 


Anion Gap 1 (6-14)    


 


Blood Urea Nitrogen


 16 mg/dL


(7-20) 


 


 





 


Creatinine


 0.6 mg/dL


(0.6-1.0) 


 


 





 


Estimated GFR


(Cockcroft-Gault) 106.3 


 


 


 





 


Glucose Level


 129 mg/dL


(70-99) 


 


 





 


Calcium Level


 10.5 mg/dL


(8.5-10.1) 


 


 





 


Magnesium Level


 2.0 mg/dL


(1.8-2.4) 


 


 





 


O2 Saturation  97 % (92-99)   


 


Arterial Blood pH


 


 7.41


(7.35-7.45) 


 





 


Arterial Blood pCO2 at


Patient Temp 


 55 mmHg


(35-46) 


 





 


Arterial Blood pO2 at Patient


Temp 


 108 mmHg


() 


 





 


Arterial Blood HCO3


 


 34 mmol/L


(21-28) 


 





 


Arterial Blood Base Excess


 


 8 mmol/L


(-3-3) 


 





 


FiO2  33   


 


Glucose (Fingerstick)


 


 


 128 mg/dL


(70-99) 123 mg/dL


(70-99)


 


Test


 6/29/20


05:53 6/29/20


06:00 


 





 


Glucose (Fingerstick)


 125 mg/dL


(70-99) 


 


 





 


Sodium Level


 


 137 mmol/L


(136-145) 


 





 


Potassium Level


 


 4.4 mmol/L


(3.5-5.1) 


 





 


Chloride Level


 


 101 mmol/L


() 


 





 


Carbon Dioxide Level


 


 35 mmol/L


(21-32) 


 





 


Anion Gap  1 (6-14)   


 


Blood Urea Nitrogen


 


 16 mg/dL


(7-20) 


 





 


Creatinine


 


 0.7 mg/dL


(0.6-1.0) 


 





 


Estimated GFR


(Cockcroft-Gault) 


 88.9 


 


 





 


Glucose Level


 


 129 mg/dL


(70-99) 


 





 


Calcium Level


 


 10.7 mg/dL


(8.5-10.1) 


 











Laboratory Tests








Test


 6/28/20


08:45 6/28/20


11:52 6/29/20


00:09 6/29/20


05:53


 


O2 Saturation 97 % (92-99)    


 


Arterial Blood pH


 7.41


(7.35-7.45) 


 


 





 


Arterial Blood pCO2 at


Patient Temp 55 mmHg


(35-46) 


 


 





 


Arterial Blood pO2 at Patient


Temp 108 mmHg


() 


 


 





 


Arterial Blood HCO3


 34 mmol/L


(21-28) 


 


 





 


Arterial Blood Base Excess


 8 mmol/L


(-3-3) 


 


 





 


FiO2 33    


 


Glucose (Fingerstick)


 


 128 mg/dL


(70-99) 123 mg/dL


(70-99) 125 mg/dL


(70-99)


 


Test


 6/29/20


06:00 


 


 





 


Sodium Level


 137 mmol/L


(136-145) 


 


 





 


Potassium Level


 4.4 mmol/L


(3.5-5.1) 


 


 





 


Chloride Level


 101 mmol/L


() 


 


 





 


Carbon Dioxide Level


 35 mmol/L


(21-32) 


 


 





 


Anion Gap 1 (6-14)    


 


Blood Urea Nitrogen


 16 mg/dL


(7-20) 


 


 





 


Creatinine


 0.7 mg/dL


(0.6-1.0) 


 


 





 


Estimated GFR


(Cockcroft-Gault) 88.9 


 


 


 





 


Glucose Level


 129 mg/dL


(70-99) 


 


 





 


Calcium Level


 10.7 mg/dL


(8.5-10.1) 


 


 











Microbiology


6/18/20 Blood Culture - Final, Complete


          NO GROWTH AFTER 5 DAYS


6/15/20 Gram Stain - Final, Complete


          


6/15/20 Aerobic and Anaerobic Culture - Final, Complete


          


6/13/20 Gram Stain Evaluation - Final, Complete


          


6/13/20 Respiratory Culture - Final, Complete


          


6/13/20 Antimicrobic Susceptibility - Final, Complete


          


6/7/20 Urine Culture - Final, Complete


         


5/30/20 Gram Stain - Final, Complete


          


5/30/20 Aerobic Culture - Final, Complete


Medications





Current Medications


Sodium Chloride 1,000 ml @  1,000 mls/hr Q1H IV  Last administered on 3/16/20at 

03:00;  Start 3/16/20 at 03:00;  Stop 3/16/20 at 03:59;  Status DC


Ondansetron HCl (Zofran) 4 mg 1X  ONCE IVP  Last administered on 3/16/20at 

03:27;  Start 3/16/20 at 03:00;  Stop 3/16/20 at 03:01;  Status DC


Morphine Sulfate (Morphine Sulfate) 4 mg 1X  ONCE IV ;  Start 3/16/20 at 03:00; 

Stop 3/16/20 at 03:01;  Status Cancel


Ketorolac Tromethamine (Toradol 30mg Vial) 30 mg 1X  ONCE IV  Last administered 

on 3/16/20at 02:54;  Start 3/16/20 at 03:00;  Stop 3/16/20 at 03:01;  Status DC


Fentanyl Citrate (Fentanyl 2ml Vial) 25 mcg 1X  ONCE IVP  Last administered on 

3/16/20at 03:23;  Start 3/16/20 at 03:30;  Stop 3/16/20 at 03:31;  Status DC


Fentanyl Citrate (Fentanyl 2ml Vial) 100 mcg STK-MED ONCE .ROUTE ;  Start 

3/16/20 at 03:18;  Stop 3/16/20 at 03:18;  Status DC


Iohexol (Omnipaque 350 Mg/ml) 90 ml 1X  ONCE IV  Last administered on 3/16/20at 

03:25;  Start 3/16/20 at 03:30;  Stop 3/16/20 at 03:31;  Status DC


Info (CONTRAST GIVEN -- Rx MONITORING) 1 each PRN DAILY  PRN MC SEE COMMENTS;  

Start 3/16/20 at 03:30;  Stop 3/18/20 at 03:29;  Status DC


Hydromorphone HCl (Dilaudid) 0.5 mg 1X  ONCE IV  Last administered on 3/16/20at 

03:55;  Start 3/16/20 at 04:30;  Stop 3/16/20 at 04:32;  Status DC


Ondansetron HCl (Zofran) 4 mg PRN Q8HRS  PRN IV NAUSEA/VOMITING 1ST CHOICE;  

Start 3/16/20 at 05:00;  Stop 3/16/20 at 09:27;  Status DC


Morphine Sulfate (Morphine Sulfate) 2 mg PRN Q2HR  PRN IV SEVERE PAIN 7-10 Last 

administered on 3/17/20at 12:26;  Start 3/16/20 at 05:00;  Stop 3/17/20 at 

14:15;  Status DC


Sodium Chloride 1,000 ml @  125 mls/hr Q8H IV  Last administered on 3/16/20at 

20:56;  Start 3/16/20 at 05:00;  Stop 3/17/20 at 04:59;  Status DC


Hydromorphone HCl (Dilaudid) 0.5 mg PRN Q3HRS  PRN IV SEVERE PAIN 7-10 Last 

administered on 3/17/20at 10:06;  Start 3/16/20 at 05:00;  Stop 3/17/20 at 

12:01;  Status DC


Piperacillin Sod/ Tazobactam Sod 4.5 gm/Sodium Chloride 100 ml @  200 mls/hr 1X 

ONCE IV  Last administered on 3/16/20at 05:44;  Start 3/16/20 at 06:00;  Stop 

3/16/20 at 06:29;  Status DC


Ondansetron HCl (Zofran) 4 mg PRN Q4HRS  PRN IV NAUSEA/VOMITING 1ST CHOICE Last 

administered on 6/27/20at 13:37;  Start 3/16/20 at 09:30


Insulin Human Lispro (HumaLOG) 0-9 UNITS Q6HRS SQ  Last administered on 

6/24/20at 18:05;  Start 3/16/20 at 09:30


Dextrose (Dextrose 50%-Water Syringe) 12.5 gm PRN Q15MIN  PRN IV SEE COMMENTS;  

Start 3/16/20 at 09:30


Pantoprazole Sodium (PROTONIX VIAL for IV PUSH) 40 mg DAILYAC IVP  Last 

administered on 6/28/20at 08:23;  Start 3/16/20 at 11:30


Prochlorperazine Edisylate (Compazine) 10 mg PRN Q6HRS  PRN IV NAUSEA/VOMITING, 

2nd CHOICE Last administered on 6/27/20at 10:53;  Start 3/16/20 at 17:45


Atenolol (Tenormin) 100 mg DAILY PO ;  Start 3/17/20 at 09:00;  Stop 3/16/20 at 

20:08;  Status DC


Metoprolol Tartrate (Lopressor Vial) 2.5 mg Q6HRS IVP  Last administered on 

3/17/20at 05:51;  Start 3/16/20 at 20:15;  Stop 3/17/20 at 10:02;  Status DC


Metoprolol Tartrate (Lopressor Vial) 5 mg Q6HRS IVP  Last administered on 3/26/2

0at 00:12;  Start 3/17/20 at 10:15;  Stop 3/28/20 at 08:48;  Status DC


Hydromorphone HCl (Dilaudid) 1 mg PRN Q3HRS  PRN IV SEVERE PAIN 7-10 Last 

administered on 3/23/20at 05:13;  Start 3/17/20 at 12:00;  Stop 3/31/20 at 

00:25;  Status DC


Lidocaine HCl (Buffered Lidocaine 1%) 3 ml STK-MED ONCE .ROUTE ;  Start 3/17/20 

at 12:55;  Stop 3/17/20 at 12:56;  Status DC


Albumin Human 500 ml @  125 mls/hr 1X  ONCE IV  Last administered on 3/17/20at 

14:33;  Start 3/17/20 at 14:30;  Stop 3/17/20 at 18:32;  Status DC


Norepinephrine Bitartrate 8 mg/ Dextrose 258 ml @  17.299 mls/ hr CONT  PRN IV 

PER PROTOCOL Last administered on 4/14/20at 12:48;  Start 3/17/20 at 15:30;  

Stop 4/17/20 at 09:19;  Status DC


Sodium Chloride 1,000 ml @  125 mls/hr Q8H IV  Last administered on 3/17/20at 

21:04;  Start 3/17/20 at 16:00;  Stop 3/18/20 at 02:42;  Status DC


Albumin Human 500 ml @  125 mls/hr PRN BID  PRN IV After every 2L NSS & BP < 

90mm Last administered on 6/6/20at 11:40;  Start 3/17/20 at 16:00


Iohexol (Omnipaque 300 Mg/ml) 60 ml 1X  ONCE IV  Last administered on 3/17/20at 

17:20;  Start 3/17/20 at 17:00;  Stop 3/17/20 at 17:01;  Status DC


Info (CONTRAST GIVEN -- Rx MONITORING) 1 each PRN DAILY  PRN MC SEE COMMENTS;  

Start 3/17/20 at 17:00;  Stop 3/19/20 at 16:59;  Status DC


Meropenem 1 gm/ Sodium Chloride 100 ml @  200 mls/hr Q8HRS IV  Last administered

on 3/18/20at 05:45;  Start 3/17/20 at 20:00;  Stop 3/18/20 at 08:48;  Status DC


Furosemide (Lasix) 40 mg 1X  ONCE IVP  Last administered on 3/17/20at 22:12;  

Start 3/17/20 at 22:30;  Stop 3/17/20 at 22:31;  Status DC


Calcium Chloride 1000 mg/Sodium Chloride 110 ml @  220 mls/hr 1X  ONCE IV  Last 

administered on 3/17/20at 22:11;  Start 3/17/20 at 22:30;  Stop 3/17/20 at 

22:59;  Status DC


Albuterol Sulfate (Ventolin Neb Soln) 2.5 mg 1X  ONCE NEB  Last administered on 

3/18/20at 00:56;  Start 3/17/20 at 22:30;  Stop 3/17/20 at 22:31;  Status DC


Insulin Human Regular (HumuLIN R VIAL) 5 unit 1X  ONCE IV  Last administered on 

3/17/20at 22:14;  Start 3/17/20 at 22:30;  Stop 3/17/20 at 22:31;  Status DC


Magnesium Sulfate 50 ml @ 25 mls/hr 1X  ONCE IV  Last administered on 3/18/20at 

02:57;  Start 3/18/20 at 03:00;  Stop 3/18/20 at 04:59;  Status DC


Calcium Gluconate 1000 mg/Sodium Chloride 110 ml @  220 mls/hr 1X  ONCE IV  Last

administered on 3/18/20at 02:46;  Start 3/18/20 at 03:00;  Stop 3/18/20 at 

03:29;  Status DC


Sodium Chloride 1,000 ml @  200 mls/hr Q5H IV  Last administered on 3/18/20at 

02:46;  Start 3/18/20 at 03:00;  Stop 3/18/20 at 10:21;  Status DC


Calcium Gluconate 1000 mg/Sodium Chloride 110 ml @  220 mls/hr 1X  ONCE IV  Last

administered on 3/18/20at 03:21;  Start 3/18/20 at 03:30;  Stop 3/18/20 at 

03:59;  Status DC


Sodium Bicarbonate 50 meq/Sodium Chloride 1,050 ml @  75 mls/hr Q14H IV  Last 

administered on 3/22/20at 21:10;  Start 3/18/20 at 07:30;  Stop 3/23/20 at 

10:28;  Status DC


Calcium Gluconate 2000 mg/Sodium Chloride 120 ml @  220 mls/hr 1X  ONCE IV  Last

administered on 3/18/20at 09:05;  Start 3/18/20 at 07:30;  Stop 3/18/20 at 

08:02;  Status DC


Lidocaine HCl (Xylocaine-Mpf 1% 2ml Vial) 2 ml STK-MED ONCE .ROUTE ;  Start 

3/18/20 at 08:47;  Stop 3/18/20 at 08:47;  Status DC


Meropenem 500 mg/ Sodium Chloride 50 ml @  100 mls/hr Q12HR IV  Last 

administered on 3/23/20at 21:01;  Start 3/18/20 at 18:00;  Stop 3/24/20 at 

07:58;  Status DC


Lidocaine HCl (Buffered Lidocaine 1%) 3 ml STK-MED ONCE .ROUTE ;  Start 3/18/20 

at 09:46;  Stop 3/18/20 at 09:46;  Status DC


Lidocaine HCl (Buffered Lidocaine 1%) 6 ml 1X  ONCE INJ  Last administered on 

3/18/20at 10:26;  Start 3/18/20 at 10:15;  Stop 3/18/20 at 10:16;  Status DC


Info (Tpn Per Pharmacy) 1 each PRN DAILY  PRN MC SEE COMMENTS Last administered 

on 6/28/20at 10:04;  Start 3/18/20 at 12:00


Sodium Chloride 1,000 ml @  1,000 mls/hr Q1H PRN IV hypotension;  Start 3/18/20 

at 12:07;  Stop 3/18/20 at 18:06;  Status DC


Diphenhydramine HCl (Benadryl) 25 mg 1X PRN  PRN IV ITCHING;  Start 3/18/20 at 

12:15;  Stop 3/19/20 at 12:14;  Status DC


Diphenhydramine HCl (Benadryl) 25 mg 1X PRN  PRN IV ITCHING;  Start 3/18/20 at 

12:15;  Stop 3/19/20 at 12:14;  Status DC


Sodium Chloride 1,000 ml @  400 mls/hr Q2H30M PRN IV PATENCY;  Start 3/18/20 at 

12:07;  Stop 3/19/20 at 00:06;  Status DC


Info (PHARMACY MONITORING -- do not chart) 1 each PRN DAILY  PRN MC SEE 

COMMENTS;  Start 3/18/20 at 12:15;  Stop 3/20/20 at 08:13;  Status DC


Sodium Chloride 90 meq/Calcium Gluconate 10 meq/ Multivitamins 10 ml/Chromium/ 

Copper/Manganese/ Seleni/Zn 1 ml/ Total Parenteral Nutrition/Amino 

Acids/Dextrose/ Fat Emulsion Intravenous 55.005 ml  @ 2.292 mls/hr TPN  CONT IV 

;  Start 3/18/20 at 22:00;  Stop 3/18/20 at 12:33;  Status DC


Info (Tpn Per Pharmacy) 1 each PRN DAILY  PRN MC SEE COMMENTS;  Start 3/18/20 at

12:30;  Status UNV


Sodium Chloride 90 meq/Calcium Gluconate 10 meq/ Multivitamins 10 ml/Chromium/ 

Copper/Manganese/ Seleni/Zn 0.5 ml/ Total Parenteral Nutrition/Amino 

Acids/Dextrose/ Fat Emulsion Intravenous 1,512 ml @  63 mls/hr TPN  CONT IV  

Last administered on 3/18/20at 22:06;  Start 3/18/20 at 22:00;  Stop 3/19/20 at 

21:59;  Status DC


Calcium Carbonate/ Glycine (Tums) 500 mg PRN AFTMEALHC  PRN PO INDIGESTION;  

Start 3/18/20 at 17:45;  Stop 5/13/20 at 10:25;  Status DC


Calcium Gluconate (Calcium Gluconate) 2,000 mg 1X  ONCE IVP  Last administered 

on 3/19/20at 02:19;  Start 3/19/20 at 02:15;  Stop 3/19/20 at 02:16;  Status DC


Calcium Chloride 3000 mg/Sodium Chloride 1,030 ml @  50 mls/hr [id number] IV  Last 

administered on 3/21/20at 02:17;  Start 3/19/20 at 08:00;  Stop 3/21/20 at 

15:23;  Status DC


Lorazepam (Ativan Inj) 1 mg PRN Q4HRS  PRN IVP ANXIETY / AGITATION, 2nd choic 

Last administered on 4/17/20at 03:51;  Start 3/19/20 at 09:00;  Stop 4/17/20 at 

09:19;  Status DC


Sodium Chloride 1,000 ml @  1,000 mls/hr Q1H PRN IV hypotension;  Start 3/19/20 

at 08:56;  Stop 3/19/20 at 14:55;  Status DC


Albumin Human 200 ml @  200 mls/hr 1X PRN  PRN IV Hypotension;  Start 3/19/20 at

09:00;  Stop 3/19/20 at 14:59;  Status DC


Diphenhydramine HCl (Benadryl) 25 mg 1X PRN  PRN IV ITCHING;  Start 3/19/20 at 

09:00;  Stop 3/20/20 at 08:59;  Status DC


Diphenhydramine HCl (Benadryl) 25 mg 1X PRN  PRN IV ITCHING;  Start 3/19/20 at 

09:00;  Stop 3/20/20 at 08:59;  Status DC


Sodium Chloride 1,000 ml @  400 mls/hr Q2H30M PRN IV PATENCY;  Start 3/19/20 at 

08:56;  Stop 3/19/20 at 20:55;  Status DC


Info (PHARMACY MONITORING -- do not chart) 1 each PRN DAILY  PRN MC SEE 

COMMENTS;  Start 3/19/20 at 09:00;  Status UNV


Info (PHARMACY MONITORING -- do not chart) 1 each PRN DAILY  PRN MC SEE CO

MMENTS;  Start 3/19/20 at 09:00;  Stop 3/20/20 at 08:13;  Status DC


Digoxin (Lanoxin) 500 mcg 1X  ONCE IV  Last administered on 3/19/20at 10:04;  

Start 3/19/20 at 10:00;  Stop 3/19/20 at 10:01;  Status DC


Digoxin (Lanoxin) 125 mcg 1X  ONCE IV  Last administered on 3/19/20at 17:10;  

Start 3/19/20 at 18:00;  Stop 3/19/20 at 18:01;  Status DC


Magnesium Sulfate 100 ml @  25 mls/hr 1X  ONCE IV  Last administered on 

3/19/20at 12:48;  Start 3/19/20 at 13:00;  Stop 3/19/20 at 16:59;  Status DC


Sodium Chloride 90 meq/Magnesium Sulfate 10 meq/ Calcium Gluconate 20 meq/ 

Multivitamins 10 ml/Chromium/ Copper/Manganese/ Seleni/Zn 0.5 ml/ Total Pa

renteral Nutrition/Amino Acids/Dextrose/ Fat Emulsion Intravenous 1,512 ml @  63

mls/hr TPN  CONT IV  Last administered on 3/19/20at 22:25;  Start 3/19/20 at 

22:00;  Stop 3/20/20 at 21:59;  Status DC


Sodium Chloride 1,000 ml @  1,000 mls/hr Q1H PRN IV hypotension;  Start 3/20/20 

at 08:05;  Stop 3/20/20 at 14:04;  Status DC


Albumin Human 200 ml @  200 mls/hr 1X  ONCE IV  Last administered on 3/20/20at 

08:57;  Start 3/20/20 at 08:15;  Stop 3/20/20 at 09:14;  Status DC


Diphenhydramine HCl (Benadryl) 25 mg 1X PRN  PRN IV ITCHING;  Start 3/20/20 at 

08:15;  Stop 3/21/20 at 08:14;  Status DC


Diphenhydramine HCl (Benadryl) 25 mg 1X PRN  PRN IV ITCHING;  Start 3/20/20 at 

08:15;  Stop 3/21/20 at 08:14;  Status DC


Sodium Chloride 1,000 ml @  400 mls/hr Q2H30M PRN IV PATENCY;  Start 3/20/20 at 

08:05;  Stop 3/20/20 at 20:04;  Status DC


Info (PHARMACY MONITORING -- do not chart) 1 each PRN DAILY  PRN MC SEE 

COMMENTS;  Start 3/20/20 at 08:15;  Stop 3/24/20 at 07:57;  Status DC


Sodium Chloride 90 meq/Potassium Chloride 15 meq/ Potassium Phosphate 10 mmol/ 

Magnesium Sulfate 10 meq/Calcium Gluconate 20 meq/ Multivitamins 10 ml/Chromium/

Copper/Manganese/ Seleni/Zn 0.5 ml/ Total Parenteral Nutrition/Amino 

Acids/Dextrose/ Fat Emulsion Intravenous 1,512 ml @  63 mls/hr TPN  CONT IV  

Last administered on 3/20/20at 21:01;  Start 3/20/20 at 22:00;  Stop 3/21/20 at 

21:59;  Status DC


Potassium Chloride/Water 100 ml @  100 mls/hr 1X  ONCE IV  Last administered on 

3/20/20at 14:09;  Start 3/20/20 at 14:00;  Stop 3/20/20 at 14:59;  Status DC


Benzocaine (Hurricaine One) 1 spray 1X  ONCE MM  Last administered on 3/20/20at 

16:38;  Start 3/20/20 at 14:30;  Stop 3/20/20 at 14:31;  Status DC


Lidocaine HCl (Glydo (Lidocaine) Jelly) 1 thomas 1X  ONCE MM  Last administered on 

3/20/20at 16:38;  Start 3/20/20 at 14:30;  Stop 3/20/20 at 14:31;  Status DC


Linezolid/Dextrose 300 ml @  300 mls/hr Q12HR IV  Last administered on 3/26/20at

21:04;  Start 3/20/20 at 20:00;  Stop 3/27/20 at 07:50;  Status DC


Acetaminophen (Tylenol) 650 mg PRN Q6HRS  PRN PO MILD PAIN / TEMP;  Start 

3/21/20 at 03:30;  Stop 3/21/20 at 03:36;  Status DC


Acetaminophen (Tylenol) 650 mg PRN Q6HRS  PRN PEG MILD PAIN / TEMP Last 

administered on 4/16/20at 19:56;  Start 3/21/20 at 03:36;  Stop 5/13/20 at 

10:25;  Status DC


Sodium Chloride 1,000 ml @  1,000 mls/hr Q1H PRN IV hypotension;  Start 3/21/20 

at 07:50;  Stop 3/21/20 at 13:49;  Status DC


Albumin Human 200 ml @  200 mls/hr 1X PRN  PRN IV Hypotension;  Start 3/21/20 at

08:00;  Stop 3/21/20 at 13:59;  Status DC


Sodium Chloride (Normal Saline Flush) 10 ml 1X PRN  PRN IV AP catheter pack;  

Start 3/21/20 at 08:00;  Stop 3/22/20 at 07:59;  Status DC


Sodium Chloride (Normal Saline Flush) 10 ml 1X PRN  PRN IV  catheter pack;  

Start 3/21/20 at 08:00;  Stop 3/22/20 at 07:59;  Status DC


Sodium Chloride 1,000 ml @  400 mls/hr Q2H30M PRN IV PATENCY;  Start 3/21/20 at 

07:50;  Stop 3/21/20 at 19:49;  Status DC


Info (PHARMACY MONITORING -- do not chart) 1 each PRN DAILY  PRN MC SEE 

COMMENTS;  Start 3/21/20 at 08:00;  Status UNV


Info (PHARMACY MONITORING -- do not chart) 1 each PRN DAILY  PRN MC SEE 

COMMENTS;  Start 3/21/20 at 08:00;  Stop 3/23/20 at 08:25;  Status DC


Sodium Chloride 90 meq/Potassium Chloride 15 meq/ Potassium Phosphate 10 mmol/ 

Magnesium Sulfate 10 meq/Calcium Gluconate 20 meq/ Multivitamins 10 ml/Chromium/

Copper/Manganese/ Seleni/Zn 0.5 ml/ Total Parenteral Nutrition/Amino 

Acids/Dextrose/ Fat Emulsion Intravenous 1,512 ml @  63 mls/hr TPN  CONT IV  

Last administered on 3/21/20at 20:57;  Start 3/21/20 at 22:00;  Stop 3/22/20 at 

21:59;  Status DC


Sodium Chloride 90 meq/Potassium Chloride 15 meq/ Potassium Phosphate 15 mmol/ 

Magnesium Sulfate 10 meq/Calcium Gluconate 20 meq/ Multivitamins 10 ml/Chromium/

Copper/Manganese/ Seleni/Zn 0.5 ml/ Total Parenteral Nutrition/Amino 

Acids/Dextrose/ Fat Emulsion Intravenous 1,512 ml @  63 mls/hr TPN  CONT IV ;  

Start 3/22/20 at 22:00;  Stop 3/22/20 at 14:16;  Status DC


Sodium Chloride 90 meq/Potassium Chloride 15 meq/ Potassium Phosphate 15 mmol/ 

Magnesium Sulfate 10 meq/Calcium Gluconate 20 meq/ Multivitamins 10 ml/Chromium/

Copper/Manganese/ Seleni/Zn 0.5 ml/ Total Parenteral Nutrition/Amino 

Acids/Dextrose/ Fat Emulsion Intravenous 1,200 ml @  50 mls/hr TPN  CONT IV ;  

Start 3/22/20 at 22:00;  Stop 3/22/20 at 14:17;  Status DC


Sodium Chloride 90 meq/Potassium Chloride 15 meq/ Potassium Phosphate 10 mmol/ 

Magnesium Sulfate 10 meq/Calcium Gluconate 20 meq/ Multivitamins 10 ml/Chromium/

Copper/Manganese/ Seleni/Zn 0.5 ml/ Total Parenteral Nutrition/Amino 

Acids/Dextrose/ Fat Emulsion Intravenous 1,200 ml @  50 mls/hr TPN  CONT IV  

Last administered on 3/22/20at 23:29;  Start 3/22/20 at 22:00;  Stop 3/23/20 at 

21:59;  Status DC


Sodium Chloride 1,000 ml @  1,000 mls/hr Q1H PRN IV hypotension;  Start 3/23/20 

at 07:28;  Stop 3/23/20 at 13:27;  Status DC


Albumin Human 200 ml @  200 mls/hr 1X  ONCE IV  Last administered on 3/23/20at 

08:51;  Start 3/23/20 at 07:30;  Stop 3/23/20 at 08:29;  Status DC


Diphenhydramine HCl (Benadryl) 25 mg 1X PRN  PRN IV ITCHING;  Start 3/23/20 at 

07:30;  Stop 3/24/20 at 07:29;  Status DC


Diphenhydramine HCl (Benadryl) 25 mg 1X PRN  PRN IV ITCHING;  Start 3/23/20 at 

07:30;  Stop 3/24/20 at 07:29;  Status DC


Sodium Chloride 1,000 ml @  400 mls/hr Q2H30M PRN IV PATENCY;  Start 3/23/20 at 

07:28;  Stop 3/23/20 at 19:27;  Status DC


Info (PHARMACY MONITORING -- do not chart) 1 each PRN DAILY  PRN MC SEE 

COMMENTS;  Start 3/23/20 at 07:30;  Stop 4/3/20 at 13:01;  Status DC


Metronidazole 100 ml @  100 mls/hr Q6HRS IV  Last administered on 4/8/20at 

06:26;  Start 3/23/20 at 08:30;  Stop 4/8/20 at 09:58;  Status DC


Micafungin Sodium 100 mg/Dextrose 100 ml @  100 mls/hr Q24H IV  Last 

administered on 4/30/20at 08:18;  Start 3/23/20 at 09:00;  Stop 4/30/20 at 

20:58;  Status DC


Propofol 0 ml @ As Directed STK-MED ONCE IV ;  Start 3/23/20 at 07:53;  Stop 3/2

3/20 at 07:53;  Status DC


Etomidate (Amidate) 20 mg STK-MED ONCE IV ;  Start 3/23/20 at 07:53;  Stop 

3/23/20 at 07:54;  Status DC


Midazolam HCl (Versed) 5 mg STK-MED ONCE .ROUTE ;  Start 3/23/20 at 07:57;  Stop

3/23/20 at 07:57;  Status DC


Fentanyl Citrate 30 ml @ 0 mls/hr CONT  PRN IV SEE PROTOCOL Last administered on

4/17/20at 06:12;  Start 3/23/20 at 08:15;  Stop 4/17/20 at 09:19;  Status DC


Artificial Tears (Artificial Tears) 1 drop PRN Q1HR  PRN OU DRY EYE, 1st choice;

 Start 3/23/20 at 08:15;  Stop 4/29/20 at 05:31;  Status DC


Midazolam HCl 50 mg/Sodium Chloride 50 ml @ 0 mls/hr CONT  PRN IV SEE PROTOCOL 

Last administered on 3/26/20at 22:39;  Start 3/23/20 at 08:15;  Stop 3/28/20 at 

15:59;  Status DC


Etomidate (Amidate) 8 mg 1X  ONCE IV  Last administered on 3/23/20at 08:33;  

Start 3/23/20 at 08:30;  Stop 3/23/20 at 08:31;  Status DC


Succinylcholine Chloride (Anectine) 120 mg 1X  ONCE IV  Last administered on 

3/23/20at 08:34;  Start 3/23/20 at 08:30;  Stop 3/23/20 at 08:31;  Status DC


Midazolam HCl (Versed) 5 mg 1X  ONCE IV ;  Start 3/23/20 at 08:30;  Stop 3/23/20

at 08:31;  Status DC


Potassium Chloride 15 meq/ Bicarbonate Dialysis Soln w/ out KCl 5,007.5 ml  @ 

1,000 mls/ hr Q5H1M IV  Last administered on 3/24/20at 11:11;  Start 3/23/20 at 

12:00;  Stop 3/24/20 at 11:15;  Status DC


Potassium Chloride 15 meq/ Bicarbonate Dialysis Soln w/ out KCl 5,007.5 ml  @ 

1,000 mls/ hr Q5H1M IV  Last administered on 3/24/20at 11:12;  Start 3/23/20 at 

12:00;  Stop 3/24/20 at 11:17;  Status DC


Potassium Chloride 15 meq/ Bicarbonate Dialysis Soln w/ out KCl 5,007.5 ml  @ 

1,000 mls/ hr Q5H1M IV  Last administered on 3/24/20at 11:11;  Start 3/23/20 at 

12:00;  Stop 3/24/20 at 11:19;  Status DC


Sodium Chloride 90 meq/Potassium Chloride 15 meq/ Potassium Phosphate 10 mmol/ 

Magnesium Sulfate 10 meq/Calcium Gluconate 20 meq/ Multivitamins 10 ml/Chromium/

Copper/Manganese/ Seleni/Zn 0.5 ml/ Total Parenteral Nutrition/Amino 

Acids/Dextrose/ Fat Emulsion Intravenous 1,400 ml @  58.333 mls/ hr TPN  CONT IV

 Last administered on 3/23/20at 21:42;  Start 3/23/20 at 22:00;  Stop 3/24/20 at

21:59;  Status DC


Heparin Sodium (Porcine) (Heparin Sodium) 5,000 unit Q8HRS SQ  Last administered

on 3/28/20at 05:55;  Start 3/23/20 at 15:00;  Stop 3/28/20 at 13:28;  Status DC


Meropenem 500 mg/ Sodium Chloride 50 ml @  100 mls/hr Q6HRS IV  Last 

administered on 3/25/20at 06:00;  Start 3/24/20 at 09:00;  Stop 3/25/20 at 

07:29;  Status DC


Potassium Phosphate 20 mmol/ Sodium Chloride 106.6667 ml @  51.667 m... 1X  ONCE

IV  Last administered on 3/24/20at 11:22;  Start 3/24/20 at 10:15;  Stop 3/24/20

at 12:18;  Status DC


Acetaminophen (Tylenol Supp) 650 mg PRN Q6HRS  PRN ME MILD PAIN / TEMP > 100.3'F

Last administered on 6/28/20at 20:49;  Start 3/24/20 at 10:30


Potassium Chloride/Water 100 ml @  100 mls/hr Q1H IV  Last administered on 

3/24/20at 12:12;  Start 3/24/20 at 11:00;  Stop 3/24/20 at 12:59;  Status DC


Potassium Chloride 20 meq/ Bicarbonate Dialysis Soln w/ out KCl 5,010 ml @  

1,000 mls/hr Q5H1M IV  Last administered on 3/25/20at 08:48;  Start 3/24/20 at 

12:00;  Stop 3/25/20 at 13:03;  Status DC


Potassium Chloride 20 meq/ Bicarbonate Dialysis Soln w/ out KCl 5,010 ml @  

1,000 mls/hr Q5H1M IV  Last administered on 3/29/20at 14:52;  Start 3/24/20 at 

11:30;  Stop 3/29/20 at 19:59;  Status DC


Potassium Chloride 20 meq/ Bicarbonate Dialysis Soln w/ out KCl 5,010 ml @  

1,000 mls/hr Q5H1M IV  Last administered on 3/29/20at 14:53;  Start 3/24/20 at 

11:30;  Stop 3/29/20 at 19:59;  Status DC


Sodium Chloride 90 meq/Potassium Chloride 15 meq/ Potassium Phosphate 15 mmol/ 

Magnesium Sulfate 10 meq/Calcium Gluconate 15 meq/ Multivitamins 10 ml/Chromium/

Copper/Manganese/ Seleni/Zn 0.5 ml/ Total Parenteral Nutrition/Amino 

Acids/Dextrose/ Fat Emulsion Intravenous 1,400 ml @  58.333 mls/ hr TPN  CONT IV

 Last administered on 3/24/20at 22:17;  Start 3/24/20 at 22:00;  Stop 3/25/20 at

21:59;  Status DC


Cefepime HCl (Maxipime) 2 gm Q12HR IVP  Last administered on 4/7/20at 20:56;  

Start 3/25/20 at 09:00;  Stop 4/8/20 at 09:58;  Status DC


Daptomycin 500 mg/ Sodium Chloride 50 ml @  100 mls/hr Q48H IV  Last 

administered on 4/10/20at 09:57;  Start 3/25/20 at 08:30;  Stop 4/10/20 at 

10:07;  Status DC


Lidocaine HCl (Buffered Lidocaine 1%) 3 ml 1X  ONCE INJ  Last administered on 

3/25/20at 10:27;  Start 3/25/20 at 10:30;  Stop 3/25/20 at 10:31;  Status DC


Potassium Phosphate 20 mmol/ Sodium Chloride 106.6667 ml @  51.667 m... 1X  ONCE

IV  Last administered on 3/25/20at 12:51;  Start 3/25/20 at 13:00;  Stop 3/25/20

at 15:03;  Status DC


Sodium Chloride 90 meq/Potassium Chloride 15 meq/ Potassium Phosphate 18 mmol/ 

Magnesium Sulfate 8 meq/Calcium Gluconate 15 meq/ Multivitamins 10 ml/Chromium/ 

Copper/Manganese/ Seleni/Zn 0.5 ml/ Total Parenteral Nutrition/Amino 

Acids/Dextrose/ Fat Emulsion Intravenous 1,400 ml @  58.333 mls/ hr TPN  CONT IV

 Last administered on 3/25/20at 22:16;  Start 3/25/20 at 22:00;  Stop 3/26/20 at

21:59;  Status DC


Potassium Chloride 20 meq/ Bicarbonate Dialysis Soln w/ out KCl 5,010 ml @  

1,000 mls/hr Q5H1M IV  Last administered on 3/29/20at 14:54;  Start 3/25/20 at 

16:00;  Stop 3/29/20 at 19:59;  Status DC


Multi-Ingred Cream/Lotion/Oil/ Oint (Artificial Tears Eye Ointment) 1 thomas PRN 

Q1HR  PRN OU DRY EYE, 2nd choice Last administered on 4/13/20at 08:19;  Start 

3/25/20 at 17:30;  Stop 6/3/20 at 14:39;  Status DC


Sodium Chloride 90 meq/Potassium Chloride 15 meq/ Potassium Phosphate 18 mmol/ 

Magnesium Sulfate 8 meq/Calcium Gluconate 15 meq/ Multivitamins 10 ml/Chromium/ 

Copper/Manganese/ Seleni/Zn 0.5 ml/ Total Parenteral Nutrition/Amino 

Acids/Dextrose/ Fat Emulsion Intravenous 1,400 ml @  58.333 mls/ hr TPN  CONT IV

 Last administered on 3/26/20at 22:00;  Start 3/26/20 at 22:00;  Stop 3/27/20 at

21:59;  Status DC


Albumin Human 500 ml @  125 mls/hr 1X  ONCE IV ;  Start 3/26/20 at 14:15;  Stop 

3/26/20 at 18:14;  Status DC


Sodium Chloride 90 meq/Potassium Chloride 15 meq/ Potassium Phosphate 18 mmol/ 

Magnesium Sulfate 8 meq/Calcium Gluconate 15 meq/ Multivitamins 10 ml/Chromium/ 

Copper/Manganese/ Seleni/Zn 0.5 ml/ Insulin Human Regular 10 unit/ Total 

Parenteral Nutrition/Amino Acids/Dextrose/ Fat Emulsion Intravenous 1,400 ml @  

58.333 mls/ hr TPN  CONT IV  Last administered on 3/27/20at 21:43;  Start 3/

27/20 at 22:00;  Stop 3/28/20 at 21:59;  Status DC


Lidocaine HCl (Buffered Lidocaine 1%) 3 ml [id number] ONCE .ROUTE ;  Start 3/25/20 

at 10:00;  Stop 3/27/20 at 13:57;  Status DC


Midazolam HCl 100 mg/Sodium Chloride 100 ml @ 7 mls/hr CONT  PRN IV SEE PROTOCOL

Last administered on 4/8/20at 15:35;  Start 3/28/20 at 16:00;  Stop 6/3/20 at 

14:38;  Status DC


Sodium Chloride 90 meq/Potassium Chloride 15 meq/ Potassium Phosphate 18 mmol/ 

Magnesium Sulfate 8 meq/Calcium Gluconate 15 meq/ Multivitamins 10 ml/Chromium/ 

Copper/Manganese/ Seleni/Zn 0.5 ml/ Insulin Human Regular 15 unit/ Total 

Parenteral Nutrition/Amino Acids/Dextrose/ Fat Emulsion Intravenous 1,400 ml @  

58.333 mls/ hr TPN  CONT IV  Last administered on 3/28/20at 20:34;  Start 

3/28/20 at 22:00;  Stop 3/29/20 at 21:59;  Status DC


Info ([facility] Electrolyte Protocol) 1 ea CONT PRN  PRN MC PER PROTOCOL;  Start 

3/29/20 at 13:15


Sodium Chloride 90 meq/Potassium Chloride 15 meq/ Potassium Phosphate 18 mmol/ 

Magnesium Sulfate 8 meq/Calcium Gluconate 15 meq/ Multivitamins 10 ml/Chromium/ 

Copper/Manganese/ Seleni/Zn 0.5 ml/ Insulin Human Regular 15 unit/ Total 

Parenteral Nutrition/Amino Acids/Dextrose/ Fat Emulsion Intravenous 1,400 ml @  

58.333 mls/ hr TPN  CONT IV  Last administered on 3/29/20at 22:05;  Start 

3/29/20 at 22:00;  Stop 3/30/20 at 21:59;  Status DC


Potassium Chloride 15 meq/ Bicarbonate Dialysis Soln w/ out KCl 5,007.5 ml  @ 

1,000 mls/ hr Q5H1M IV  Last administered on 4/1/20at 18:14;  Start 3/29/20 at 

20:00;  Stop 4/2/20 at 13:08;  Status DC


Potassium Chloride 15 meq/ Bicarbonate Dialysis Soln w/ out KCl 5,007.5 ml  @ 

1,000 mls/ hr Q5H1M IV  Last administered on 4/1/20at 18:14;  Start 3/29/20 at 

20:00;  Stop 4/2/20 at 13:08;  Status DC


Potassium Chloride 15 meq/ Bicarbonate Dialysis Soln w/ out KCl 5,007.5 ml  @ 

1,000 mls/ hr Q5H1M IV  Last administered on 4/1/20at 18:14;  Start 3/29/20 at 

20:00;  Stop 4/2/20 at 13:08;  Status DC


Iohexol (Omnipaque 240 Mg/ml) 30 ml 1X  ONCE PO  Last administered on 3/30/20at 

11:30;  Start 3/30/20 at 11:30;  Stop 3/30/20 at 11:33;  Status DC


Info (CONTRAST GIVEN -- Rx MONITORING) 1 each PRN DAILY  PRN MC SEE COMMENTS;  

Start 3/30/20 at 11:45;  Stop 4/1/20 at 11:44;  Status DC


Sodium Chloride 90 meq/Potassium Chloride 15 meq/ Potassium Phosphate 18 mmol/ 

Magnesium Sulfate 8 meq/Calcium Gluconate 15 meq/ Multivitamins 10 ml/Chromium/ 

Copper/Manganese/ Seleni/Zn 0.5 ml/ Insulin Human Regular 15 unit/ Total 

Parenteral Nutrition/Amino Acids/Dextrose/ Fat Emulsion Intravenous 1,400 ml @  

58.333 mls/ hr TPN  CONT IV  Last administered on 3/30/20at 21:47;  Start 

3/30/20 at 22:00;  Stop 3/31/20 at 21:59;  Status DC


Sodium Chloride 90 meq/Potassium Chloride 15 meq/ Potassium Phosphate 18 mmol/ 

Magnesium Sulfate 8 meq/Calcium Gluconate 15 meq/ Multivitamins 10 ml/Chromium/ 

Copper/Manganese/ Seleni/Zn 0.5 ml/ Insulin Human Regular 20 unit/ Total 

Parenteral Nutrition/Amino Acids/Dextrose/ Fat Emulsion Intravenous 1,400 ml @  

58.333 mls/ hr TPN  CONT IV  Last administered on 3/31/20at 21:36;  Start 

3/31/20 at 22:00;  Stop 4/1/20 at 21:59;  Status DC


Alteplase, Recombinant (Cathflo For Central Catheter Clearance) 1 mg 1X  ONCE 

INT CAT  Last administered on 3/31/20at 20:03;  Start 3/31/20 at 19:30;  Stop 

3/31/20 at 19:46;  Status DC


Alteplase, Recombinant (Cathflo For Central Catheter Clearance) 1 mg 1X  ONCE 

INT CAT  Last administered on 3/31/20at 22:05;  Start 3/31/20 at 22:00;  Stop 

3/31/20 at 22:01;  Status DC


Sodium Chloride 90 meq/Potassium Chloride 15 meq/ Potassium Phosphate 18 mmol/ 

Magnesium Sulfate 8 meq/Calcium Gluconate 15 meq/ Multivitamins 10 ml/Chromium/ 

Copper/Manganese/ Seleni/Zn 0.5 ml/ Insulin Human Regular 20 unit/ Total 

Parenteral Nutrition/Amino Acids/Dextrose/ Fat Emulsion Intravenous 1,400 ml @  

58.333 mls/ hr TPN  CONT IV  Last administered on 4/1/20at 21:30;  Start 4/1/20 

at 22:00;  Stop 4/2/20 at 21:59;  Status DC


Dexmedetomidine HCl 400 mcg/ Sodium Chloride 100 ml @ 0 mls/hr CONT  PRN IV 

ANXIETY / AGITATION Last administered on 5/30/20at 12:57;  Start 4/2/20 at 

08:15;  Stop 5/30/20 at 18:31;  Status DC


Sodium Chloride 500 ml @  500 mls/hr 1X PRN  PRN IV ELEVATED BP, SEE COMMENTS;  

Start 4/2/20 at 08:15


Atropine Sulfate (ATROPINE 0.5mg SYRINGE) 0.5 mg PRN Q5MIN  PRN IV SEE COMMENTS;

 Start 4/2/20 at 08:15


Furosemide (Lasix) 20 mg 1X  ONCE IVP  Last administered on 4/2/20at 08:19;  

Start 4/2/20 at 08:15;  Stop 4/2/20 at 08:16;  Status DC


Lidocaine HCl (Buffered Lidocaine 1%) 3 ml STK-MED ONCE .ROUTE ;  Start 4/2/20 

at 08:39;  Stop 4/2/20 at 08:39;  Status DC


Lidocaine HCl (Buffered Lidocaine 1%) 6 ml 1X  ONCE INJ  Last administered on 

4/2/20at 09:05;  Start 4/2/20 at 09:00;  Stop 4/2/20 at 09:06;  Status DC


Sodium Chloride 90 meq/Potassium Chloride 15 meq/ Potassium Phosphate 18 mmol/ 

Magnesium Sulfate 8 meq/Calcium Gluconate 15 meq/ Multivitamins 10 ml/Chromium/ 

Copper/Manganese/ Seleni/Zn 0.5 ml/ Insulin Human Regular 20 unit/ Total 

Parenteral Nutrition/Amino Acids/Dextrose/ Fat Emulsion Intravenous 1,400 ml @  

58.333 mls/ hr TPN  CONT IV  Last administered on 4/2/20at 22:45;  Start 4/2/20 

at 22:00;  Stop 4/3/20 at 21:59;  Status DC


Sodium Chloride 1,000 ml @  1,000 mls/hr Q1H PRN IV hypotension;  Start 4/3/20 

at 07:30;  Stop 4/3/20 at 13:29;  Status DC


Albumin Human 200 ml @  200 mls/hr 1X PRN  PRN IV Hypotension Last administered 

on 4/3/20at 09:36;  Start 4/3/20 at 07:30;  Stop 4/3/20 at 13:29;  Status DC


Sodium Chloride (Normal Saline Flush) 10 ml 1X PRN  PRN IV AP catheter pack;  

Start 4/3/20 at 07:30;  Stop 4/3/20 at 21:29;  Status DC


Sodium Chloride (Normal Saline Flush) 10 ml 1X PRN  PRN IV  catheter pack;  

Start 4/3/20 at 07:30;  Stop 4/4/20 at 07:29;  Status DC


Sodium Chloride 1,000 ml @  400 mls/hr Q2H30M PRN IV PATENCY;  Start 4/3/20 at 

07:30;  Stop 4/3/20 at 19:29;  Status DC


Info (PHARMACY MONITORING -- do not chart) 1 each PRN DAILY  PRN MC SEE 

COMMENTS;  Start 4/3/20 at 07:30;  Stop 4/3/20 at 13:02;  Status DC


Info (PHARMACY MONITORING -- do not chart) 1 each PRN DAILY  PRN MC SEE MIKEY DISLA;  Start 4/3/20 at 07:30;  Stop 4/5/20 at 12:45;  Status DC


Sodium Chloride 90 meq/Potassium Chloride 15 meq/ Potassium Phosphate 10 mmol/ 

Magnesium Sulfate 8 meq/Calcium Gluconate 15 meq/ Multivitamins 10 ml/Chromium/ 

Copper/Manganese/ Seleni/Zn 0.5 ml/ Insulin Human Regular 25 unit/ Total 

Parenteral Nutrition/Amino Acids/Dextrose/ Fat Emulsion Intravenous 1,400 ml @  

58.333 mls/ hr TPN  CONT IV  Last administered on 4/3/20at 22:19;  Start 4/3/20 

at 22:00;  Stop 4/4/20 at 21:59;  Status DC


Heparin Sodium (Porcine) (Heparin Sodium) 5,000 unit Q12HR SQ  Last administered

on 4/26/20at 08:59;  Start 4/3/20 at 21:00;  Stop 4/26/20 at 10:05;  Status DC


Ondansetron HCl (Zofran) 4 mg PRN Q6HRS  PRN IV NAUSEA/VOMITING;  Start 4/6/20 

at 07:00;  Stop 4/7/20 at 06:59;  Status DC


Fentanyl Citrate (Fentanyl 2ml Vial) 25 mcg PRN Q5MIN  PRN IV MILD PAIN 1-3;  

Start 4/6/20 at 07:00;  Stop 4/7/20 at 06:59;  Status DC


Fentanyl Citrate (Fentanyl 2ml Vial) 50 mcg PRN Q5MIN  PRN IV MODERATE TO SEVERE

PAIN;  Start 4/6/20 at 07:00;  Stop 4/7/20 at 06:59;  Status DC


Ringer's Solution 1,000 ml @  30 mls/hr Q24H IV ;  Start 4/6/20 at 07:00;  Stop 

4/6/20 at 18:59;  Status DC


Lidocaine HCl (Xylocaine-Mpf 1% 2ml Vial) 2 ml PRN 1X  PRN ID PRIOR TO IV START;

 Start 4/6/20 at 07:00;  Stop 4/7/20 at 06:59;  Status DC


Prochlorperazine Edisylate (Compazine) 5 mg PACU PRN  PRN IV NAUSEA, MRX1;  

Start 4/6/20 at 07:00;  Stop 4/7/20 at 06:59;  Status DC


Sodium Chloride 1,000 ml @  1,000 mls/hr Q1H PRN IV hypotension;  Start 4/4/20 

at 09:10;  Stop 4/4/20 at 15:09;  Status DC


Albumin Human 200 ml @  200 mls/hr 1X PRN  PRN IV Hypotension Last administered 

on 4/4/20at 10:10;  Start 4/4/20 at 09:15;  Stop 4/4/20 at 15:14;  Status DC


Sodium Chloride 1,000 ml @  400 mls/hr Q2H30M PRN IV PATENCY;  Start 4/4/20 at 

09:10;  Stop 4/4/20 at 21:09;  Status DC


Info (PHARMACY MONITORING -- do not chart) 1 each PRN DAILY  PRN MC SEE COMMENT

S;  Start 4/4/20 at 09:15;  Stop 4/5/20 at 12:45;  Status DC


Info (PHARMACY MONITORING -- do not chart) 1 each PRN DAILY  PRN MC SEE 

COMMENTS;  Start 4/4/20 at 09:15;  Stop 4/5/20 at 12:45;  Status DC


Sodium Chloride 90 meq/Potassium Chloride 15 meq/ Potassium Phosphate 10 mmol/ 

Magnesium Sulfate 8 meq/Calcium Gluconate 15 meq/ Multivitamins 10 ml/Chromium/ 

Copper/Manganese/ Seleni/Zn 0.5 ml/ Insulin Human Regular 25 unit/ Total 

Parenteral Nutrition/Amino Acids/Dextrose/ Fat Emulsion Intravenous 1,400 ml @  

58.333 mls/ hr TPN  CONT IV  Last administered on 4/4/20at 22:10;  Start 4/4/20 

at 22:00;  Stop 4/5/20 at 21:59;  Status DC


Magnesium Sulfate 50 ml @ 25 mls/hr PRN DAILY  PRN IV for Mag < 1.7 on am labs 

Last administered on 6/18/20at 10:57;  Start 4/5/20 at 09:15


Sodium Chloride 90 meq/Potassium Chloride 15 meq/ Potassium Phosphate 10 mmol/ 

Magnesium Sulfate 8 meq/Calcium Gluconate 15 meq/ Multivitamins 10 ml/Chromium/ 

Copper/Manganese/ Seleni/Zn 0.5 ml/ Insulin Human Regular 25 unit/ Total 

Parenteral Nutrition/Amino Acids/Dextrose/ Fat Emulsion Intravenous 1,400 ml @  

58.333 mls/ hr TPN  CONT IV  Last administered on 4/5/20at 21:20;  Start 4/5/20 

at 22:00;  Stop 4/6/20 at 21:59;  Status DC


Sodium Chloride 1,000 ml @  1,000 mls/hr Q1H PRN IV hypotension;  Start 4/5/20 

at 12:23;  Stop 4/5/20 at 18:22;  Status DC


Albumin Human 200 ml @  200 mls/hr 1X  ONCE IV  Last administered on 4/5/20at 

13:34;  Start 4/5/20 at 12:30;  Stop 4/5/20 at 13:29;  Status DC


Diphenhydramine HCl (Benadryl) 25 mg 1X PRN  PRN IV ITCHING;  Start 4/5/20 at 

12:30;  Stop 4/6/20 at 12:29;  Status DC


Diphenhydramine HCl (Benadryl) 25 mg 1X PRN  PRN IV ITCHING;  Start 4/5/20 at 

12:30;  Stop 4/6/20 at 12:29;  Status DC


Info (PHARMACY MONITORING -- do not chart) 1 each PRN DAILY  PRN MC SEE COM

MENTS;  Start 4/5/20 at 12:30;  Status Cancel


Bupivacaine HCl/ Epinephrine Bitart (Sensorcain-Epi 0.5%-1:089798 Mpf) 30 ml 

STK-MED ONCE .ROUTE  Last administered on 4/6/20at 11:44;  Start 4/6/20 at 

11:00;  Stop 4/6/20 at 11:01;  Status DC


Cellulose (Surgicel Fibrillar 1x2) 1 each STK-MED ONCE .ROUTE ;  Start 4/6/20 at

11:00;  Stop 4/6/20 at 11:01;  Status DC


Sodium Chloride 90 meq/Potassium Chloride 15 meq/ Potassium Phosphate 10 mmol/ 

Magnesium Sulfate 12 meq/Calcium Gluconate 15 meq/ Multivitamins 10 ml/Chromium/

Copper/Manganese/ Seleni/Zn 0.5 ml/ Insulin Human Regular 25 unit/ Total 

Parenteral Nutrition/Amino Acids/Dextrose/ Fat Emulsion Intravenous 1,400 ml @  

58.333 mls/ hr TPN  CONT IV  Last administered on 4/6/20at 22:24;  Start 4/6/20 

at 22:00;  Stop 4/7/20 at 21:59;  Status DC


Propofol 20 ml @ As Directed STK-MED ONCE IV ;  Start 4/6/20 at 11:07;  Stop 

4/6/20 at 11:07;  Status DC


Cellulose (Surgicel Hemostat 4x8) 1 each STK-MED ONCE .ROUTE  Last administered 

on 4/6/20at 11:44;  Start 4/6/20 at 11:55;  Stop 4/6/20 at 11:56;  Status DC


Sevoflurane (Ultane) 60 ml STK-MED ONCE IH ;  Start 4/6/20 at 12:46;  Stop 

4/6/20 at 12:46;  Status DC


Sodium Chloride 1,000 ml @  1,000 mls/hr Q1H PRN IV hypotension;  Start 4/6/20 

at 13:51;  Stop 4/6/20 at 19:50;  Status DC


Albumin Human 200 ml @  200 mls/hr 1X PRN  PRN IV Hypotension Last administered 

on 4/6/20at 14:51;  Start 4/6/20 at 14:00;  Stop 4/6/20 at 19:59;  Status DC


Diphenhydramine HCl (Benadryl) 25 mg 1X PRN  PRN IV ITCHING;  Start 4/6/20 at 

14:00;  Stop 4/7/20 at 13:59;  Status DC


Diphenhydramine HCl (Benadryl) 25 mg 1X PRN  PRN IV ITCHING;  Start 4/6/20 at 

14:00;  Stop 4/7/20 at 13:59;  Status DC


Sodium Chloride 1,000 ml @  400 mls/hr Q2H30M PRN IV PATENCY;  Start 4/6/20 at 

13:51;  Stop 4/7/20 at 01:50;  Status DC


Info (PHARMACY MONITORING -- do not chart) 1 each PRN DAILY  PRN MC SEE 

COMMENTS;  Start 4/6/20 at 14:00;  Stop 4/9/20 at 08:16;  Status DC


Heparin Sodium (Porcine) (Hep Lock Adult) 500 unit STK-MED ONCE IVP ;  Start 

4/7/20 at 09:29;  Stop 4/7/20 at 09:30;  Status DC


Sodium Chloride 1,000 ml @  1,000 mls/hr Q1H PRN IV hypotension;  Start 4/7/20 

at 10:43;  Stop 4/7/20 at 16:42;  Status DC


Sodium Chloride 1,000 ml @  400 mls/hr Q2H30M PRN IV PATENCY;  Start 4/7/20 at 

10:43;  Stop 4/7/20 at 22:42;  Status DC


Info (PHARMACY MONITORING -- do not chart) 1 each PRN DAILY  PRN MC SEE 

COMMENTS;  Start 4/7/20 at 10:45;  Status UNV


Info (PHARMACY MONITORING -- do not chart) 1 each PRN DAILY  PRN MC SEE 

COMMENTS;  Start 4/7/20 at 10:45;  Status UNV


Sodium Chloride 90 meq/Potassium Chloride 15 meq/ Magnesium Sulfate 12 

meq/Calcium Gluconate 15 meq/ Multivitamins 10 ml/Chromium/ Copper/Manganese/ 

Seleni/Zn 0.5 ml/ Insulin Human Regular 25 unit/ Total Parenteral 

Nutrition/Amino Acids/Dextrose/ Fat Emulsion Intravenous 1,400 ml @  58.333 mls/

hr TPN  CONT IV  Last administered on 4/7/20at 22:13;  Start 4/7/20 at 22:00;  

Stop 4/8/20 at 21:59;  Status DC


Sodium Chloride 1,000 ml @  1,000 mls/hr Q1H PRN IV hypotension;  Start 4/8/20 

at 07:50;  Stop 4/8/20 at 13:49;  Status DC


Albumin Human 200 ml @  200 mls/hr 1X  ONCE IV ;  Start 4/8/20 at 08:00;  Stop 

4/8/20 at 08:53;  Status DC


Diphenhydramine HCl (Benadryl) 25 mg 1X PRN  PRN IV ITCHING;  Start 4/8/20 at 

08:00;  Stop 4/9/20 at 07:59;  Status DC


Diphenhydramine HCl (Benadryl) 25 mg 1X PRN  PRN IV ITCHING;  Start 4/8/20 at 

08:00;  Stop 4/9/20 at 07:59;  Status DC


Info (PHARMACY MONITORING -- do not chart) 1 each PRN DAILY  PRN MC SEE 

COMMENTS;  Start 4/8/20 at 08:00;  Stop 4/9/20 at 08:16;  Status DC


Albumin Human 50 ml @ 50 mls/hr 1X  ONCE IV ;  Start 4/8/20 at 08:53;  Stop 

4/8/20 at 08:56;  Status DC


Albumin Human 200 ml @  50 mls/hr PRN 1X  PRN IV HYPOTENSION Last administered 

on 4/14/20at 11:54;  Start 4/8/20 at 09:00;  Stop 5/21/20 at 11:14;  Status DC


Meropenem 500 mg/ Sodium Chloride 50 ml @  100 mls/hr Q12H IV  Last administered

on 4/28/20at 10:45;  Start 4/8/20 at 10:00;  Stop 4/28/20 at 12:37;  Status DC


Sodium Chloride 90 meq/Magnesium Sulfate 12 meq/ Calcium Gluconate 15 meq/ 

Multivitamins 10 ml/Chromium/ Copper/Manganese/ Seleni/Zn 0.5 ml/ Insulin Human 

Regular 25 unit/ Total Parenteral Nutrition/Amino Acids/Dextrose/ Fat Emulsion 

Intravenous 1,400 ml @  58.333 mls/ hr TPN  CONT IV  Last administered on 

4/8/20at 21:41;  Start 4/8/20 at 22:00;  Stop 4/9/20 at 21:59;  Status DC


Sodium Chloride 1,000 ml @  1,000 mls/hr Q1H PRN IV hypotension;  Start 4/9/20 

at 07:58;  Stop 4/9/20 at 13:57;  Status DC


Albumin Human 200 ml @  200 mls/hr 1X PRN  PRN IV Hypotension Last administered 

on 4/9/20at 09:30;  Start 4/9/20 at 08:00;  Stop 4/9/20 at 13:59;  Status DC


Sodium Chloride 1,000 ml @  400 mls/hr Q2H30M PRN IV PATENCY;  Start 4/9/20 at 

07:58;  Stop 4/9/20 at 19:57;  Status DC


Info (PHARMACY MONITORING -- do not chart) 1 each PRN DAILY  PRN MC SEE 

COMMENTS;  Start 4/9/20 at 08:00;  Status Cancel


Info (PHARMACY MONITORING -- do not chart) 1 each PRN DAILY  PRN MC SEE 

COMMENTS;  Start 4/9/20 at 08:15;  Status UNV


Sodium Chloride 90 meq/Potassium Phosphate 5 mmol/ Magnesium Sulfate 12 

meq/Calcium Gluconate 15 meq/ Multivitamins 10 ml/Chromium/ Copper/Manganese/ 

Seleni/Zn 0.5 ml/ Insulin Human Regular 30 unit/ Total Parenteral 

Nutrition/Amino Acids/Dextrose/ Fat Emulsion Intravenous 1,400 ml @  58.333 mls/

hr TPN  CONT IV  Last administered on 4/9/20at 22:08;  Start 4/9/20 at 22:00;  

Stop 4/10/20 at 21:59;  Status DC


Linezolid/Dextrose 300 ml @  300 mls/hr Q12HR IV  Last administered on 4/20/20at

20:40;  Start 4/10/20 at 11:00;  Stop 4/21/20 at 08:10;  Status DC


Sodium Chloride 90 meq/Potassium Phosphate 15 mmol/ Magnesium Sulfate 12 

meq/Calcium Gluconate 15 meq/ Multivitamins 10 ml/Chromium/ Copper/Manganese/ 

Seleni/Zn 0.5 ml/ Insulin Human Regular 30 unit/ Total Parenteral 

Nutrition/Amino Acids/Dextrose/ Fat Emulsion Intravenous 1,400 ml @  58.333 mls/

hr TPN  CONT IV  Last administered on 4/10/20at 21:49;  Start 4/10/20 at 22:00; 

Stop 4/11/20 at 21:59;  Status DC


Sodium Chloride 90 meq/Potassium Phosphate 15 mmol/ Magnesium Sulfate 12 

meq/Calcium Gluconate 15 meq/ Multivitamins 10 ml/Chromium/ Copper/Manganese/ 

Seleni/Zn 0.5 ml/ Insulin Human Regular 40 unit/ Total Parenteral 

Nutrition/Amino Acids/Dextrose/ Fat Emulsion Intravenous 1,400 ml @  58.333 mls/

hr TPN  CONT IV  Last administered on 4/11/20at 21:21;  Start 4/11/20 at 22:00; 

Stop 4/12/20 at 21:59;  Status DC


Sodium Chloride 1,000 ml @  1,000 mls/hr Q1H PRN IV hypotension;  Start 4/11/20 

at 13:26;  Stop 4/11/20 at 19:25;  Status DC


Albumin Human 200 ml @  200 mls/hr 1X PRN  PRN IV Hypotension Last administered 

on 4/11/20at 15:00;  Start 4/11/20 at 13:30;  Stop 4/11/20 at 19:29;  Status DC


Sodium Chloride (Normal Saline Flush) 10 ml 1X PRN  PRN IV AP catheter pack;  

Start 4/11/20 at 13:30;  Stop 4/12/20 at 13:29;  Status DC


Sodium Chloride (Normal Saline Flush) 10 ml 1X PRN  PRN IV  catheter pack;  

Start 4/11/20 at 13:30;  Stop 4/12/20 at 13:29;  Status DC


Sodium Chloride 1,000 ml @  400 mls/hr Q2H30M PRN IV PATENCY;  Start 4/11/20 at 

13:26;  Stop 4/12/20 at 01:25;  Status DC


Info (PHARMACY MONITORING -- do not chart) 1 each PRN DAILY  PRN MC SEE 

COMMENTS;  Start 4/11/20 at 13:30;  Stop 4/11/20 at 13:33;  Status DC


Info (PHARMACY MONITORING -- do not chart) 1 each PRN DAILY  PRN MC SEE 

COMMENTS;  Start 4/11/20 at 13:30;  Stop 4/11/20 at 13:34;  Status DC


Sodium Chloride 90 meq/Potassium Phosphate 19 mmol/ Magnesium Sulfate 12 

meq/Calcium Gluconate 15 meq/ Multivitamins 10 ml/Chromium/ Copper/Manganese/ 

Seleni/Zn 0.5 ml/ Insulin Human Regular 40 unit/ Total Parenteral 

Nutrition/Amino Acids/Dextrose/ Fat Emulsion Intravenous 1,400 ml @  58.333 mls/

hr TPN  CONT IV  Last administered on 4/12/20at 21:54;  Start 4/12/20 at 22:00; 

Stop 4/13/20 at 21:59;  Status DC


Sodium Chloride 1,000 ml @  1,000 mls/hr Q1H PRN IV hypotension;  Start 4/13/20 

at 09:35;  Stop 4/13/20 at 15:34;  Status DC


Albumin Human 200 ml @  200 mls/hr 1X PRN  PRN IV Hypotension;  Start 4/13/20 at

09:45;  Stop 4/13/20 at 15:44;  Status DC


Diphenhydramine HCl (Benadryl) 25 mg 1X PRN  PRN IV ITCHING;  Start 4/13/20 at 

09:45;  Stop 4/14/20 at 09:44;  Status DC


Diphenhydramine HCl (Benadryl) 25 mg 1X PRN  PRN IV ITCHING;  Start 4/13/20 at 

09:45;  Stop 4/14/20 at 09:44;  Status DC


Sodium Chloride 1,000 ml @  400 mls/hr Q2H30M PRN IV PATENCY;  Start 4/13/20 at 

09:35;  Stop 4/13/20 at 21:34;  Status DC


Info (PHARMACY MONITORING -- do not chart) 1 each PRN DAILY  PRN MC SEE 

COMMENTS;  Start 4/13/20 at 09:45;  Status Cancel


Sodium Chloride 100 meq/Potassium Phosphate 19 mmol/ Magnesium Sulfate 12 

meq/Calcium Gluconate 15 meq/ Multivitamins 10 ml/Chromium/ Copper/Manganese/ 

Seleni/Zn 0.5 ml/ Insulin Human Regular 40 unit/ Potassium Chloride 20 meq/ 

Total Parenteral Nutrition/Amino Acids/Dextrose/ Fat Emulsion Intravenous 1,400 

ml @  58.333 mls/ hr TPN  CONT IV  Last administered on 4/13/20at 22:02;  Start 

4/13/20 at 22:00;  Stop 4/14/20 at 21:59;  Status DC


Furosemide (Lasix) 40 mg 1X  ONCE IVP  Last administered on 4/13/20at 14:39;  

Start 4/13/20 at 14:30;  Stop 4/13/20 at 14:31;  Status DC


Metronidazole 100 ml @  100 mls/hr Q8HRS IV  Last administered on 4/21/20at 

06:04;  Start 4/14/20 at 10:00;  Stop 4/21/20 at 08:10;  Status DC


Sodium Chloride 1,000 ml @  1,000 mls/hr Q1H PRN IV hypotension;  Start 4/14/20 

at 08:00;  Stop 4/14/20 at 13:59;  Status DC


Albumin Human 200 ml @  200 mls/hr 1X PRN  PRN IV Hypotension;  Start 4/14/20 at

08:00;  Stop 4/14/20 at 13:59;  Status DC


Sodium Chloride 1,000 ml @  400 mls/hr Q2H30M PRN IV PATENCY;  Start 4/14/20 at 

08:00;  Stop 4/14/20 at 19:59;  Status DC


Info (PHARMACY MONITORING -- do not chart) 1 each PRN DAILY  PRN MC SEE 

COMMENTS;  Start 4/14/20 at 11:30;  Status UNV


Info (PHARMACY MONITORING -- do not chart) 1 each PRN DAILY  PRN MC SEE 

COMMENTS;  Start 4/14/20 at 11:30;  Stop 4/16/20 at 12:13;  Status DC


Sodium Chloride 100 meq/Potassium Phosphate 19 mmol/ Magnesium Sulfate 12 

meq/Calcium Gluconate 15 meq/ Multivitamins 10 ml/Chromium/ Copper/Manganese/ 

Seleni/Zn 0.5 ml/ Insulin Human Regular 40 unit/ Potassium Chloride 20 meq/ 

Total Parenteral Nutrition/Amino Acids/Dextrose/ Fat Emulsion Intravenous 1,400 

ml @  58.333 mls/ hr TPN  CONT IV  Last administered on 4/14/20at 21:52;  Start 

4/14/20 at 22:00;  Stop 4/15/20 at 21:59;  Status DC


Sodium Chloride (Normal Saline Flush) 10 ml QSHIFT  PRN IV AFTER MEDS AND BLOOD 

DRAWS;  Start 4/14/20 at 15:00;  Stop 5/12/20 at 11:27;  Status DC


Sodium Chloride (Normal Saline Flush) 10 ml PRN Q5MIN  PRN IV AFTER MEDS AND 

BLOOD DRAWS;  Start 4/14/20 at 15:00


Sodium Chloride (Normal Saline Flush) 20 ml PRN Q5MIN  PRN IV AFTER MEDS AND 

BLOOD DRAWS;  Start 4/14/20 at 15:00


Sodium Chloride 100 meq/Potassium Phosphate 19 mmol/ Magnesium Sulfate 12 

meq/Calcium Gluconate 15 meq/ Multivitamins 10 ml/Chromium/ Copper/Manganese/ 

Seleni/Zn 0.5 ml/ Insulin Human Regular 40 unit/ Potassium Chloride 20 meq/ 

Total Parenteral Nutrition/Amino Acids/Dextrose/ Fat Emulsion Intravenous 1,400 

ml @  58.333 mls/ hr TPN  CONT IV  Last administered on 4/15/20at 21:20;  Start 

4/15/20 at 22:00;  Stop 4/16/20 at 21:59;  Status DC


Lidocaine HCl (Buffered Lidocaine 1%) 3 ml STK-MED ONCE .ROUTE ;  Start 4/15/20 

at 13:16;  Stop 4/15/20 at 13:16;  Status DC


Lidocaine HCl (Buffered Lidocaine 1%) 6 ml 1X  ONCE INJ  Last administered on 

4/15/20at 13:45;  Start 4/15/20 at 13:30;  Stop 4/15/20 at 13:31;  Status DC


Albumin Human 100 ml @  100 mls/hr 1X  ONCE IV  Last administered on 4/15/20at 

15:41;  Start 4/15/20 at 15:00;  Stop 4/15/20 at 15:59;  Status DC


Albumin Human 50 ml @ 50 mls/hr 1X  ONCE IV  Last administered on 4/15/20at 

15:00;  Start 4/15/20 at 15:00;  Stop 4/15/20 at 15:59;  Status DC


Info (PHARMACY MONITORING -- do not chart) 1 each PRN DAILY  PRN MC SEE 

COMMENTS;  Start 4/16/20 at 11:30;  Status Cancel


Info (PHARMACY MONITORING -- do not chart) 1 each PRN DAILY  PRN MC SEE 

COMMENTS;  Start 4/16/20 at 11:30;  Status UNV


Sodium Chloride 100 meq/Potassium Phosphate 10 mmol/ Magnesium Sulfate 12 

meq/Calcium Gluconate 15 meq/ Multivitamins 10 ml/Chromium/ Copper/Manganese/ 

Seleni/Zn 0.5 ml/ Insulin Human Regular 35 unit/ Potassium Chloride 20 meq/ 

Total Parenteral Nutrition/Amino Acids/Dextrose/ Fat Emulsion Intravenous 1,400 

ml @  58.333 mls/ hr TPN  CONT IV  Last administered on 4/16/20at 22:10;  Start 

4/16/20 at 22:00;  Stop 4/17/20 at 21:59;  Status DC


Sodium Chloride 100 meq/Potassium Phosphate 5 mmol/ Magnesium Sulfate 12 

meq/Calcium Gluconate 15 meq/ Multivitamins 10 ml/Chromium/ Copper/Manganese/ 

Seleni/Zn 0.5 ml/ Insulin Human Regular 35 unit/ Potassium Chloride 20 meq/ 

Total Parenteral Nutrition/Amino Acids/Dextrose/ Fat Emulsion Intravenous 1,400 

ml @  58.333 mls/ hr TPN  CONT IV  Last administered on 4/17/20at 22:59;  Start 

4/17/20 at 22:00;  Stop 4/18/20 at 21:59;  Status DC


Sodium Chloride 1,000 ml @  1,000 mls/hr Q1H PRN IV hypotension;  Start 4/18/20 

at 08:27;  Stop 4/18/20 at 14:26;  Status DC


Albumin Human 200 ml @  200 mls/hr 1X PRN  PRN IV Hypotension Last administered 

on 4/18/20at 09:18;  Start 4/18/20 at 08:30;  Stop 4/18/20 at 14:29;  Status DC


Sodium Chloride 1,000 ml @  400 mls/hr Q2H30M PRN IV PATENCY;  Start 4/18/20 at 

08:27;  Stop 4/18/20 at 20:26;  Status DC


Info (PHARMACY MONITORING -- do not chart) 1 each PRN DAILY  PRN MC SEE 

COMMENTS;  Start 4/18/20 at 08:30;  Status Cancel


Info (PHARMACY MONITORING -- do not chart) 1 each PRN DAILY  PRN MC SEE 

COMMENTS;  Start 4/18/20 at 08:30;  Stop 4/26/20 at 13:10;  Status DC


Sodium Chloride 100 meq/Potassium Chloride 40 meq/ Magnesium Sulfate 15 

meq/Calcium Gluconate 15 meq/ Multivitamins 10 ml/Chromium/ Copper/Manganese/ 

Seleni/Zn 0.5 ml/ Insulin Human Regular 35 unit/ Total Parenteral Nutrit

ion/Amino Acids/Dextrose/ Fat Emulsion Intravenous 1,400 ml @  58.333 mls/ hr 

TPN  CONT IV  Last administered on 4/18/20at 22:00;  Start 4/18/20 at 22:00;  

Stop 4/19/20 at 21:59;  Status DC


Potassium Chloride/Water 100 ml @  100 mls/hr 1X  ONCE IV  Last administered on 

4/18/20at 17:28;  Start 4/18/20 at 14:45;  Stop 4/18/20 at 15:44;  Status DC


Sodium Chloride 100 meq/Potassium Chloride 40 meq/ Magnesium Sulfate 15 

meq/Calcium Gluconate 15 meq/ Multivitamins 10 ml/Chromium/ Copper/Manganese/ 

Seleni/Zn 0.5 ml/ Insulin Human Regular 35 unit/ Total Parenteral 

Nutrition/Amino Acids/Dextrose/ Fat Emulsion Intravenous 1,400 ml @  58.333 mls/

hr TPN  CONT IV  Last administered on 4/19/20at 22:46;  Start 4/19/20 at 22:00; 

Stop 4/20/20 at 21:59;  Status DC


Sodium Chloride 100 meq/Potassium Chloride 40 meq/ Magnesium Sulfate 20 

meq/Calcium Gluconate 15 meq/ Multivitamins 10 ml/Chromium/ Copper/Manganese/ 

Seleni/Zn 0.5 ml/ Insulin Human Regular 35 unit/ Total Parenteral 

Nutrition/Amino Acids/Dextrose/ Fat Emulsion Intravenous 1,400 ml @  58.333 mls/

hr TPN  CONT IV  Last administered on 4/20/20at 22:31;  Start 4/20/20 at 22:00; 

Stop 4/21/20 at 21:59;  Status DC


Fentanyl Citrate (Fentanyl 2ml Vial) 50 mcg PRN Q2HR  PRN IVP PAIN Last 

administered on 4/27/20at 13:32;  Start 4/20/20 at 21:00;  Stop 4/28/20 at 

12:53;  Status DC


Fentanyl Citrate (Fentanyl 2ml Vial) 25 mcg PRN Q2HR  PRN IVP PAIN;  Start 

4/20/20 at 21:00;  Stop 4/28/20 at 12:54;  Status DC


Enoxaparin Sodium (Lovenox 100mg Syringe) 100 mg Q12HR SQ ;  Start 4/21/20 at 

21:00;  Status UNV


Amino Acids/ Glycerin/ Electrolytes 1,000 ml @  75 mls/hr [id number] IV ;  Start 

4/20/20 at 21:15;  Status UNV


Sodium Chloride 1,000 ml @  1,000 mls/hr Q1H PRN IV hypotension;  Start 4/21/20 

at 07:56;  Stop 4/21/20 at 13:55;  Status DC


Albumin Human 200 ml @  200 mls/hr 1X PRN  PRN IV Hypotension Last administered 

on 4/21/20at 08:40;  Start 4/21/20 at 08:00;  Stop 4/21/20 at 13:59;  Status DC


Sodium Chloride 1,000 ml @  400 mls/hr Q2H30M PRN IV PATENCY;  Start 4/21/20 at 

07:56;  Stop 4/21/20 at 19:55;  Status DC


Info (PHARMACY MONITORING -- do not chart) 1 each PRN DAILY  PRN MC SEE 

COMMENTS;  Start 4/21/20 at 08:00;  Status UNV


Info (PHARMACY MONITORING -- do not chart) 1 each PRN DAILY  PRN MC SEE 

COMMENTS;  Start 4/21/20 at 08:00;  Status UNV


Daptomycin 430 mg/ Sodium Chloride 50 ml @  100 mls/hr Q24H IV  Last 

administered on 4/21/20at 12:35;  Start 4/21/20 at 09:00;  Stop 4/21/20 at 

12:49;  Status DC


Sodium Chloride 100 meq/Potassium Chloride 40 meq/ Magnesium Sulfate 20 

meq/Calcium Gluconate 15 meq/ Multivitamins 10 ml/Chromium/ Copper/Manganese/ 

Seleni/Zn 0.5 ml/ Insulin Human Regular 35 unit/ Total Parenteral 

Nutrition/Amino Acids/Dextrose/ Fat Emulsion Intravenous 1,400 ml @  58.333 mls/

hr TPN  CONT IV  Last administered on 4/21/20at 21:26;  Start 4/21/20 at 22:00; 

Stop 4/22/20 at 21:59;  Status DC


Daptomycin 430 mg/ Sodium Chloride 50 ml @  100 mls/hr Q48H IV ;  Start 4/23/20 

at 09:00;  Stop 4/22/20 at 11:55;  Status DC


Sodium Chloride 100 meq/Potassium Chloride 40 meq/ Magnesium Sulfate 20 

meq/Calcium Gluconate 15 meq/ Multivitamins 10 ml/Chromium/ Copper/Manganese/ 

Seleni/Zn 0.5 ml/ Insulin Human Regular 35 unit/ Total Parenteral 

Nutrition/Amino Acids/Dextrose/ Fat Emulsion Intravenous 1,400 ml @  58.333 mls/

hr TPN  CONT IV  Last administered on 4/22/20at 22:27;  Start 4/22/20 at 22:00; 

Stop 4/23/20 at 21:59;  Status DC


Daptomycin 430 mg/ Sodium Chloride 50 ml @  100 mls/hr Q24H IV  Last 

administered on 4/24/20at 15:07;  Start 4/22/20 at 13:00;  Stop 4/25/20 at 

13:15;  Status DC


Sodium Chloride 100 meq/Potassium Chloride 40 meq/ Magnesium Sulfate 20 

meq/Calcium Gluconate 10 meq/ Multivitamins 10 ml/Chromium/ Copper/Manganese/ 

Seleni/Zn 0.5 ml/ Insulin Human Regular 35 unit/ Total Parenteral 

Nutrition/Amino Acids/Dextrose/ Fat Emulsion Intravenous 1,400 ml @  58.333 mls/

hr TPN  CONT IV  Last administered on 4/24/20at 00:06;  Start 4/23/20 at 22:00; 

Stop 4/24/20 at 21:59;  Status DC


Alteplase, Recombinant (Cathflo For Central Catheter Clearance) 1 mg 1X  ONCE 

INT CAT  Last administered on 4/24/20at 11:44;  Start 4/24/20 at 10:45;  Stop 

4/24/20 at 10:46;  Status DC


Ondansetron HCl (Zofran) 4 mg PRN Q6HRS  PRN IV NAUSEA/VOMITING;  Start 4/27/20 

at 07:00;  Stop 4/28/20 at 06:59;  Status DC


Fentanyl Citrate (Fentanyl 2ml Vial) 25 mcg PRN Q5MIN  PRN IV MILD PAIN 1-3;  

Start 4/27/20 at 07:00;  Stop 4/28/20 at 06:59;  Status DC


Fentanyl Citrate (Fentanyl 2ml Vial) 50 mcg PRN Q5MIN  PRN IV MODERATE TO SEVERE

PAIN Last administered on 4/27/20at 10:17;  Start 4/27/20 at 07:00;  Stop 

4/28/20 at 06:59;  Status DC


Ringer's Solution 1,000 ml @  30 mls/hr Q24H IV ;  Start 4/27/20 at 07:00;  Stop

4/27/20 at 18:59;  Status DC


Lidocaine HCl (Xylocaine-Mpf 1% 2ml Vial) 2 ml PRN 1X  PRN ID PRIOR TO IV START;

 Start 4/27/20 at 07:00;  Stop 4/28/20 at 06:59;  Status DC


Prochlorperazine Edisylate (Compazine) 5 mg PACU PRN  PRN IV NAUSEA, MRX1;  

Start 4/27/20 at 07:00;  Stop 4/28/20 at 06:59;  Status DC


Sodium Acetate 50 meq/Potassium Acetate 55 meq/ Magnesium Sulfate 20 meq/Calcium

Gluconate 10 meq/ Multivitamins 10 ml/Chromium/ Copper/Manganese/ Seleni/Zn 0.5 

ml/ Insulin Human Regular 35 unit/ Total Parenteral Nutrition/Amino Aci

ds/Dextrose/ Fat Emulsion Intravenous 1,400 ml @  58.333 mls/ hr TPN  CONT IV ; 

Start 4/24/20 at 22:00;  Stop 4/24/20 at 14:15;  Status DC


Sodium Acetate 50 meq/Potassium Acetate 55 meq/ Magnesium Sulfate 20 meq/Calcium

Gluconate 10 meq/ Multivitamins 10 ml/Chromium/ Copper/Manganese/ Seleni/Zn 0.5 

ml/ Insulin Human Regular 35 unit/ Total Parenteral Nutrition/Amino 

Acids/Dextrose/ Fat Emulsion Intravenous 1,800 ml @  75 mls/hr TPN  CONT IV  

Last administered on 4/24/20at 22:38;  Start 4/24/20 at 22:00;  Stop 4/25/20 at 

21:59;  Status DC


Sodium Chloride 1,000 ml @  1,000 mls/hr Q1H PRN IV hypotension;  Start 4/24/20 

at 15:31;  Stop 4/24/20 at 21:30;  Status DC


Diphenhydramine HCl (Benadryl) 25 mg 1X PRN  PRN IV ITCHING;  Start 4/24/20 at 

15:45;  Stop 4/25/20 at 15:44;  Status DC


Diphenhydramine HCl (Benadryl) 25 mg 1X PRN  PRN IV ITCHING;  Start 4/24/20 at 

15:45;  Stop 4/25/20 at 15:44;  Status DC


Sodium Chloride 1,000 ml @  400 mls/hr Q2H30M PRN IV PATENCY;  Start 4/24/20 at 

15:31;  Stop 4/25/20 at 03:30;  Status DC


Info (PHARMACY MONITORING -- do not chart) 1 each PRN DAILY  PRN MC SEE 

COMMENTS;  Start 4/24/20 at 15:45;  Stop 5/26/20 at 14:14;  Status DC


Sodium Acetate 50 meq/Potassium Acetate 55 meq/ Magnesium Sulfate 20 meq/Calcium

Gluconate 10 meq/ Multivitamins 10 ml/Chromium/ Copper/Manganese/ Seleni/Zn 0.5 

ml/ Insulin Human Regular 35 unit/ Total Parenteral Nutrition/Amino Acid

s/Dextrose/ Fat Emulsion Intravenous 1,800 ml @  75 mls/hr TPN  CONT IV  Last 

administered on 4/25/20at 22:03;  Start 4/25/20 at 22:00;  Stop 4/26/20 at 

21:59;  Status DC


Daptomycin 430 mg/ Sodium Chloride 50 ml @  100 mls/hr Q24H IV  Last 

administered on 4/30/20at 13:00;  Start 4/25/20 at 13:00;  Stop 4/30/20 at 

20:58;  Status DC


Heparin Sodium (Porcine) 1000 unit/Sodium Chloride 1,001 ml @  1,001 mls/hr 1X  

ONCE IRR ;  Start 4/27/20 at 06:00;  Stop 4/27/20 at 06:59;  Status DC


Potassium Acetate 55 meq/Magnesium Sulfate 20 meq/ Calcium Gluconate 10 meq/ 

Multivitamins 10 ml/Chromium/ Copper/Manganese/ Seleni/Zn 0.5 ml/ Insulin Human 

Regular 35 unit/ Total Parenteral Nutrition/Amino Acids/Dextrose/ Fat Emulsion 

Intravenous 1,920 ml @  80 mls/hr TPN  CONT IV  Last administered on 4/26/20at 

22:10;  Start 4/26/20 at 22:00;  Stop 4/27/20 at 21:59;  Status DC


Dexamethasone Sodium Phosphate (Decadron) 4 mg STK-MED ONCE .ROUTE ;  Start 

4/27/20 at 10:56;  Stop 4/27/20 at 10:57;  Status DC


Ondansetron HCl (Zofran) 4 mg STK-MED ONCE .ROUTE ;  Start 4/27/20 at 10:56;  

Stop 4/27/20 at 10:57;  Status DC


Rocuronium Bromide (Zemuron) 50 mg STK-MED ONCE .ROUTE ;  Start 4/27/20 at 

10:56;  Stop 4/27/20 at 10:57;  Status DC


Fentanyl Citrate (Fentanyl 2ml Vial) 100 mcg STK-MED ONCE .ROUTE ;  Start 

4/27/20 at 10:56;  Stop 4/27/20 at 10:57;  Status DC


Bupivacaine HCl/ Epinephrine Bitart (Sensorcain-Epi 0.5%-1:122154 Mpf) 30 ml 

STK-MED ONCE .ROUTE  Last administered on 4/27/20at 12:01;  Start 4/27/20 at 

10:58;  Stop 4/27/20 at 10:58;  Status DC


Cellulose (Surgicel Hemostat 2x14) 1 each STK-MED ONCE .ROUTE ;  Start 4/27/20 

at 10:58;  Stop 4/27/20 at 10:59;  Status DC


Iohexol (Omnipaque 300 Mg/ml) 50 ml STK-MED ONCE .ROUTE ;  Start 4/27/20 at 

10:58;  Stop 4/27/20 at 10:59;  Status DC


Cellulose (Surgicel Hemostat 4x8) 1 each STK-MED ONCE .ROUTE ;  Start 4/27/20 at

10:58;  Stop 4/27/20 at 10:59;  Status DC


Bisacodyl (Dulcolax Supp) 10 mg STK-MED ONCE .ROUTE ;  Start 4/27/20 at 10:59;  

Stop 4/27/20 at 10:59;  Status DC


Heparin Sodium (Porcine) 1000 unit/Sodium Chloride 1,001 ml @  1,001 mls/hr 1X  

ONCE IRR ;  Start 4/27/20 at 12:00;  Stop 4/27/20 at 12:59;  Status DC


Propofol 20 ml @ As Directed STK-MED ONCE IV ;  Start 4/27/20 at 11:05;  Stop 

4/27/20 at 11:05;  Status DC


Sevoflurane (Ultane) 90 ml STK-MED ONCE IH ;  Start 4/27/20 at 11:05;  Stop 

4/27/20 at 11:05;  Status DC


Sevoflurane (Ultane) 60 ml STK-MED ONCE IH ;  Start 4/27/20 at 12:26;  Stop 

4/27/20 at 12:27;  Status DC


Propofol 20 ml @ As Directed STK-MED ONCE IV ;  Start 4/27/20 at 12:26;  Stop 

4/27/20 at 12:27;  Status DC


Phenylephrine HCl (PHENYLEPHRINE in 0.9% NACL PF) 1 mg STK-MED ONCE IV ;  Start 

4/27/20 at 12:34;  Stop 4/27/20 at 12:34;  Status DC


Heparin Sodium (Porcine) (Heparin Sodium) 5,000 unit Q12HR SQ  Last administered

on 5/6/20at 20:57;  Start 4/27/20 at 21:00;  Stop 5/7/20 at 09:59;  Status DC


Sodium Chloride (Normal Saline Flush) 3 ml QSHIFT  PRN IV AFTER MEDS AND BLOOD 

DRAWS;  Start 4/27/20 at 13:45


Naloxone HCl (Narcan) 0.4 mg PRN Q2MIN  PRN IV SEE INSTRUCTIONS Last 

administered on 6/6/20at 15:15;  Start 4/27/20 at 13:45


Sodium Chloride 1,000 ml @  25 mls/hr Q24H IV  Last administered on 5/26/20at 

13:37;  Start 4/27/20 at 13:37;  Stop 5/29/20 at 13:09;  Status DC


Naloxone HCl (Narcan) 0.4 mg PRN Q2MIN  PRN IV SEE INSTRUCTIONS;  Start 4/27/20 

at 14:30;  Status UNV


Sodium Chloride 1,000 ml @  25 mls/hr Q24H IV ;  Start 4/27/20 at 14:30;  Status

UNV


Hydromorphone HCl 30 ml @ 0 mls/hr CONT PRN  PRN IV PER PROTOCOL Last 

administered on 5/2/20at 16:08;  Start 4/27/20 at 14:30;  Stop 5/4/20 at 08:55; 

Status DC


Potassium Acetate 55 meq/Magnesium Sulfate 20 meq/ Calcium Gluconate 10 meq/ 

Multivitamins 10 ml/Chromium/ Copper/Manganese/ Seleni/Zn 0.5 ml/ Insulin Human 

Regular 35 unit/ Total Parenteral Nutrition/Amino Acids/Dextrose/ Fat Emulsion 

Intravenous 1,920 ml @  80 mls/hr TPN  CONT IV  Last administered on 4/27/20at 

22:01;  Start 4/27/20 at 22:00;  Stop 4/28/20 at 21:59;  Status DC


Bumetanide (Bumex) 2 mg [id number] IV  Last administered on 5/1/20at 13:50;  Start 

4/28/20 at 14:00;  Stop 5/2/20 at 14:10;  Status DC


Meropenem 1 gm/ Sodium Chloride 100 ml @  200 mls/hr Q8HRS IV  Last administered

on 5/22/20at 05:53;  Start 4/28/20 at 14:00;  Stop 5/22/20 at 09:31;  Status DC


Potassium Acetate 55 meq/Magnesium Sulfate 20 meq/ Calcium Gluconate 10 meq/ 

Multivitamins 10 ml/Chromium/ Copper/Manganese/ Seleni/Zn 0.5 ml/ Insulin Human 

Regular 35 unit/ Total Parenteral Nutrition/Amino Acids/Dextrose/ Fat Emulsion 

Intravenous 1,920 ml @  80 mls/hr TPN  CONT IV  Last administered on 4/28/20at 

22:02;  Start 4/28/20 at 22:00;  Stop 4/29/20 at 21:59;  Status DC


Hydromorphone HCl (Dilaudid Standard PCA) 12 mg STK-MED ONCE IV ;  Start 4/27/20

at 14:35;  Stop 4/28/20 at 13:53;  Status DC


Artificial Tears (Artificial Tears) 1 drop PRN Q15MIN  PRN OU DRY EYE Last 

administered on 6/23/20at 21:17;  Start 4/29/20 at 05:30


Hydromorphone HCl (Dilaudid Standard PCA) 12 mg STK-MED ONCE IV ;  Start 4/28/20

at 12:05;  Stop 4/29/20 at 09:15;  Status DC


Potassium Acetate 65 meq/Magnesium Sulfate 20 meq/ Calcium Gluconate 10 meq/ 

Multivitamins 10 ml/Chromium/ Copper/Manganese/ Seleni/Zn 0.5 ml/ Insulin Human 

Regular 30 unit/ Total Parenteral Nutrition/Amino Acids/Dextrose/ Fat Emulsion 

Intravenous 1,920 ml @  80 mls/hr TPN  CONT IV  Last administered on 4/29/20at 2

2:22;  Start 4/29/20 at 22:00;  Stop 4/30/20 at 21:59;  Status DC


Cyclobenzaprine HCl (Flexeril) 10 mg PRN Q6HRS  PRN PO MUSCLE SPASMS;  Start 

4/30/20 at 10:45


Potassium Acetate 55 meq/Magnesium Sulfate 20 meq/ Calcium Gluconate 10 meq/ 

Multivitamins 10 ml/Chromium/ Copper/Manganese/ Seleni/Zn 0.5 ml/ Insulin Human 

Regular 30 unit/ Total Parenteral Nutrition/Amino Acids/Dextrose/ Fat Emulsion 

Intravenous 1,920 ml @  80 mls/hr TPN  CONT IV  Last administered on 5/1/20at 

01:00;  Start 4/30/20 at 22:00;  Stop 5/1/20 at 21:59;  Status DC


Magnesium Sulfate 50 ml @ 25 mls/hr 1X  ONCE IV  Last administered on 4/30/20at 

17:18;  Start 4/30/20 at 12:45;  Stop 4/30/20 at 14:44;  Status DC


Potassium Chloride/Water 100 ml @  100 mls/hr 1X  ONCE IV  Last administered on 

5/1/20at 11:27;  Start 5/1/20 at 12:00;  Stop 5/1/20 at 12:59;  Status DC


Hydromorphone HCl (Dilaudid Standard PCA) 12 mg STK-MED ONCE IV ;  Start 4/29/20

at 10:50;  Stop 5/1/20 at 11:02;  Status DC


Hydromorphone HCl (Dilaudid Standard PCA) 12 mg STK-MED ONCE IV ;  Start 4/30/20

at 13:47;  Stop 5/1/20 at 11:03;  Status DC


Potassium Acetate 30 meq/Magnesium Sulfate 20 meq/ Calcium Gluconate 10 meq/ 

Multivitamins 10 ml/Chromium/ Copper/Manganese/ Seleni/Zn 0.5 ml/ Insulin Human 

Regular 30 unit/ Potassium Chloride 30 meq/ Total Parenteral Nutrition/Amino 

Acids/Dextrose/ Fat Emulsion Intravenous 1,920 ml @  80 mls/hr TPN  CONT IV  

Last administered on 5/1/20at 22:34;  Start 5/1/20 at 22:00;  Stop 5/2/20 at 

21:59;  Status DC


Potassium Chloride/Water 100 ml @  100 mls/hr Q1H IV  Last administered on 

5/2/20at 13:05;  Start 5/2/20 at 07:00;  Stop 5/2/20 at 10:59;  Status DC


Magnesium Sulfate 50 ml @ 25 mls/hr 1X  ONCE IV  Last administered on 5/2/20at 

10:34;  Start 5/2/20 at 10:30;  Stop 5/2/20 at 12:29;  Status DC


Potassium Chloride 75 meq/ Magnesium Sulfate 20 meq/Calcium Gluconate 10 meq/ 

Multivitamins 10 ml/Chromium/ Copper/Manganese/ Seleni/Zn 0.5 ml/ Insulin Human 

Regular 30 unit/ Total Parenteral Nutrition/Amino Acids/Dextrose/ Fat Emulsion 

Intravenous 1,920 ml @  80 mls/hr TPN  CONT IV  Last administered on 5/2/20at 

21:51;  Start 5/2/20 at 22:00;  Stop 5/3/20 at 22:00;  Status DC


Potassium Chloride 75 meq/ Magnesium Sulfate 20 meq/Calcium Gluconate 10 meq/ 

Multivitamins 10 ml/Chromium/ Copper/Manganese/ Seleni/Zn 0.5 ml/ Insulin Human 

Regular 25 unit/ Total Parenteral Nutrition/Amino Acids/Dextrose/ Fat Emulsion 

Intravenous 1,920 ml @  80 mls/hr TPN  CONT IV  Last administered on 5/3/20at 

22:04;  Start 5/3/20 at 22:00;  Stop 5/4/20 at 21:59;  Status DC


Hydromorphone HCl (Dilaudid) 0.4 mg PRN Q4HRS  PRN IVP PAIN Last administered on

5/4/20at 10:57;  Start 5/4/20 at 09:00;  Stop 5/4/20 at 18:59;  Status DC


Micafungin Sodium 100 mg/Dextrose 100 ml @  100 mls/hr Q24H IV  Last 

administered on 5/26/20at 12:17;  Start 5/4/20 at 11:00;  Stop 5/27/20 at 09:59;

 Status DC


Daptomycin 485 mg/ Sodium Chloride 50 ml @  100 mls/hr Q24H IV  Last administe

red on 5/11/20at 13:10;  Start 5/4/20 at 11:00;  Stop 5/12/20 at 07:44;  Status 

DC


Potassium Chloride 75 meq/ Magnesium Sulfate 15 meq/Calcium Gluconate 8 meq/ 

Multivitamins 10 ml/Chromium/ Copper/Manganese/ Seleni/Zn 0.5 ml/ Insulin Human 

Regular 25 unit/ Total Parenteral Nutrition/Amino Acids/Dextrose/ Fat Emulsion 

Intravenous 1,920 ml @  80 mls/hr TPN  CONT IV  Last administered on 5/4/20at 

23:08;  Start 5/4/20 at 22:00;  Stop 5/5/20 at 21:59;  Status DC


Haloperidol Lactate (Haldol Inj) 3 mg 1X  ONCE IVP  Last administered on 

5/4/20at 14:37;  Start 5/4/20 at 14:30;  Stop 5/4/20 at 14:31;  Status DC


Hydromorphone HCl (Dilaudid) 1 mg PRN Q4HRS  PRN IVP PAIN Last administered on 

5/18/20at 06:25;  Start 5/4/20 at 19:00;  Stop 5/18/20 at 17:10;  Status DC


Potassium Chloride 75 meq/ Magnesium Sulfate 15 meq/Calcium Gluconate 8 meq/ 

Multivitamins 10 ml/Chromium/ Copper/Manganese/ Seleni/Zn 0.5 ml/ Insulin Human 

Regular 20 unit/ Total Parenteral Nutrition/Amino Acids/Dextrose/ Fat Emulsion 

Intravenous 1,920 ml @  80 mls/hr TPN  CONT IV  Last administered on 5/5/20at 

22:10;  Start 5/5/20 at 22:00;  Stop 5/6/20 at 21:59;  Status DC


Lidocaine HCl (Buffered Lidocaine 1%) 3 ml STK-MED ONCE .ROUTE ;  Start 5/6/20 

at 11:31;  Stop 5/6/20 at 11:31;  Status DC


Lidocaine HCl (Buffered Lidocaine 1%) 3 ml STK-MED ONCE .ROUTE ;  Start 5/6/20 

at 12:28;  Stop 5/6/20 at 12:29;  Status DC


Lidocaine HCl (Buffered Lidocaine 1%) 6 ml 1X  ONCE INJ  Last administered on 

5/6/20at 12:53;  Start 5/6/20 at 12:45;  Stop 5/6/20 at 12:46;  Status DC


Potassium Chloride 75 meq/ Magnesium Sulfate 15 meq/Calcium Gluconate 8 meq/ 

Multivitamins 10 ml/Chromium/ Copper/Manganese/ Seleni/Zn 0.5 ml/ Insulin Human 

Regular 20 unit/ Total Parenteral Nutrition/Amino Acids/Dextrose/ Fat Emulsion 

Intravenous 1,920 ml @  80 mls/hr TPN  CONT IV  Last administered on 5/6/20at 

22:00;  Start 5/6/20 at 22:00;  Stop 5/7/20 at 21:59;  Status DC


Potassium Chloride 75 meq/ Magnesium Sulfate 15 meq/Calcium Gluconate 8 meq/ 

Multivitamins 10 ml/Chromium/ Copper/Manganese/ Seleni/Zn 0.5 ml/ Insulin Human 

Regular 15 unit/ Total Parenteral Nutrition/Amino Acids/Dextrose/ Fat Emulsion 

Intravenous 1,920 ml @  80 mls/hr TPN  CONT IV  Last administered on 5/7/20at 

22:28;  Start 5/7/20 at 22:00;  Stop 5/8/20 at 21:59;  Status DC


Vecuronium Bromide (Norcuron Bolus) 6 mg PRN Q6HRS  PRN IV VENT ASYNCHRONY;  

Start 5/7/20 at 19:15;  Stop 5/7/20 at 19:35;  Status DC


Bumetanide (Bumex) 2 mg 1X  ONCE IV  Last administered on 5/7/20at 22:09;  Start

5/7/20 at 19:45;  Stop 5/7/20 at 19:46;  Status DC


Lidocaine HCl (Buffered Lidocaine 1%) 3 ml STK-MED ONCE .ROUTE ;  Start 5/8/20 

at 07:59;  Stop 5/8/20 at 07:59;  Status DC


Midazolam HCl (Versed) 5 mg STK-MED ONCE .ROUTE ;  Start 5/8/20 at 08:36;  Stop 

5/8/20 at 08:36;  Status DC


Fentanyl Citrate (Fentanyl 5ml Vial) 250 mcg STK-MED ONCE .ROUTE ;  Start 5/8/20

at 08:36;  Stop 5/8/20 at 08:37;  Status DC


Lidocaine HCl (Buffered Lidocaine 1%) 3 ml 1X  ONCE IJ  Last administered on 

5/8/20at 09:30;  Start 5/8/20 at 09:15;  Stop 5/8/20 at 09:16;  Status DC


Midazolam HCl (Versed) 5 mg 1X  ONCE IV  Last administered on 5/8/20at 09:30;  

Start 5/8/20 at 09:15;  Stop 5/8/20 at 09:16;  Status DC


Fentanyl Citrate (Fentanyl 5ml Vial) 250 mcg 1X  ONCE IV  Last administered on 

5/8/20at 09:30;  Start 5/8/20 at 09:15;  Stop 5/8/20 at 09:16;  Status DC


Bumetanide (Bumex) 2 mg DAILY IV  Last administered on 5/18/20at 08:07;  Start 

5/8/20 at 10:00;  Stop 5/18/20 at 17:15;  Status DC


Potassium Chloride 75 meq/ Magnesium Sulfate 15 meq/ Multivitamins 10 

ml/Chromium/ Copper/Manganese/ Seleni/Zn 0.5 ml/ Insulin Human Regular 15 unit/ 

Total Parenteral Nutrition/Amino Acids/Dextrose/ Fat Emulsion Intravenous 1,920 

ml @  80 mls/hr TPN  CONT IV  Last administered on 5/8/20at 21:59;  Start 5/8/20

at 22:00;  Stop 5/9/20 at 21:59;  Status DC


Metoclopramide HCl (Reglan Vial) 10 mg PRN Q3HRS  PRN IVP NAUSEA/VOMITING-3rd 

choice Last administered on 5/14/20at 04:25;  Start 5/9/20 at 16:45


Potassium Chloride 75 meq/ Magnesium Sulfate 15 meq/ Multivitamins 10 

ml/Chromium/ Copper/Manganese/ Seleni/Zn 0.5 ml/ Insulin Human Regular 15 unit/ 

Total Parenteral Nutrition/Amino Acids/Dextrose/ Fat Emulsion Intravenous 1,920 

ml @  80 mls/hr TPN  CONT IV  Last administered on 5/9/20at 22:41;  Start 5/9/20

at 22:00;  Stop 5/10/20 at 21:59;  Status DC


Magnesium Sulfate 50 ml @ 25 mls/hr 1X  ONCE IV  Last administered on 5/10/20at 

10:44;  Start 5/10/20 at 09:00;  Stop 5/10/20 at 10:59;  Status DC


Potassium Chloride/Water 100 ml @  100 mls/hr 1X  ONCE IV  Last administered on 

5/10/20at 09:37;  Start 5/10/20 at 09:00;  Stop 5/10/20 at 09:59;  Status DC


Duloxetine HCl (Cymbalta) 30 mg DAILY PO  Last administered on 5/11/20at 09:48; 

Start 5/10/20 at 14:00;  Stop 5/13/20 at 10:25;  Status DC


Potassium Chloride 80 meq/ Magnesium Sulfate 20 meq/ Multivitamins 10 m

l/Chromium/ Copper/Manganese/ Seleni/Zn 0.5 ml/ Insulin Human Regular 15 unit/ 

Total Parenteral Nutrition/Amino Acids/Dextrose/ Fat Emulsion Intravenous 1,920 

ml @  80 mls/hr TPN  CONT IV  Last administered on 5/10/20at 21:42;  Start 

5/10/20 at 22:00;  Stop 5/11/20 at 21:59;  Status DC


Potassium Chloride 80 meq/ Magnesium Sulfate 20 meq/ Multivitamins 10 

ml/Chromium/ Copper/Manganese/ Seleni/Zn 0.5 ml/ Insulin Human Regular 15 unit/ 

Total Parenteral Nutrition/Amino Acids/Dextrose/ Fat Emulsion Intravenous 1,920 

ml @  80 mls/hr TPN  CONT IV  Last administered on 5/11/20at 22:20;  Start 

5/11/20 at 22:00;  Stop 5/12/20 at 21:59;  Status DC


Lidocaine HCl (Buffered Lidocaine 1%) 3 ml STK-MED ONCE .ROUTE ;  Start 5/12/20 

at 09:54;  Stop 5/12/20 at 09:55;  Status DC


Hydromorphone HCl (Dilaudid Standard PCA) 12 mg STK-MED ONCE IV ;  Start 5/1/20 

at 15:50;  Stop 5/12/20 at 11:24;  Status DC


Potassium Chloride 80 meq/ Magnesium Sulfate 20 meq/ Multivitamins 10 

ml/Chromium/ Copper/Manganese/ Seleni/Zn 0.5 ml/ Insulin Human Regular 15 unit/ 

Total Parenteral Nutrition/Amino Acids/Dextrose/ Fat Emulsion Intravenous 1,920 

ml @  80 mls/hr TPN  CONT IV  Last administered on 5/12/20at 21:40;  Start 

5/12/20 at 22:00;  Stop 5/13/20 at 21:59;  Status DC


Lidocaine HCl (Buffered Lidocaine 1%) 6 ml 1X  ONCE INJ  Last administered on 

5/12/20at 14:15;  Start 5/12/20 at 14:15;  Stop 5/12/20 at 14:16;  Status DC


Potassium Chloride 80 meq/ Magnesium Sulfate 20 meq/ Multivitamins 10 

ml/Chromium/ Copper/Manganese/ Seleni/Zn 1 ml/ Insulin Human Regular 15 unit/ 

Total Parenteral Nutrition/Amino Acids/Dextrose/ Fat Emulsion Intravenous 1,920 

ml @  80 mls/hr TPN  CONT IV  Last administered on 5/13/20at 22:04;  Start 

5/13/20 at 22:00;  Stop 5/14/20 at 21:59;  Status DC


Potassium Chloride/Water 100 ml @  100 mls/hr 1X  ONCE IV  Last administered on 

5/14/20at 11:34;  Start 5/14/20 at 11:00;  Stop 5/14/20 at 11:59;  Status DC


Potassium Chloride 90 meq/ Magnesium Sulfate 20 meq/ Multivitamins 10 

ml/Chromium/ Copper/Manganese/ Seleni/Zn 1 ml/ Insulin Human Regular 15 unit/ 

Total Parenteral Nutrition/Amino Acids/Dextrose/ Fat Emulsion Intravenous 1,920 

ml @  80 mls/hr TPN  CONT IV  Last administered on 5/14/20at 22:57;  Start 

5/14/20 at 22:00;  Stop 5/15/20 at 21:59;  Status DC


Potassium Chloride 90 meq/ Magnesium Sulfate 20 meq/ Multivitamins 10 

ml/Chromium/ Copper/Manganese/ Seleni/Zn 1 ml/ Insulin Human Regular 15 unit/ 

Total Parenteral Nutrition/Amino Acids/Dextrose/ Fat Emulsion Intravenous 1,920 

ml @  80 mls/hr TPN  CONT IV  Last administered on 5/15/20at 22:48;  Start 

5/15/20 at 22:00;  Stop 5/16/20 at 21:59;  Status DC


Potassium Chloride 90 meq/ Magnesium Sulfate 20 meq/ Multivitamins 10 

ml/Chromium/ Copper/Manganese/ Seleni/Zn 1 ml/ Insulin Human Regular 15 unit/ 

Total Parenteral Nutrition/Amino Acids/Dextrose/ Fat Emulsion Intravenous 1,890 

ml @  78.75 mls/ hr TPN  CONT IV  Last administered on 5/16/20at 22:15;  Start 

5/16/20 at 22:00;  Stop 5/17/20 at 21:59;  Status DC


Linezolid/Dextrose 300 ml @  300 mls/hr Q12HR IV  Last administered on 5/19/20at

21:08;  Start 5/17/20 at 09:00;  Stop 5/20/20 at 08:11;  Status DC


Daptomycin 450 mg/ Sodium Chloride 50 ml @  100 mls/hr Q24H IV  Last 

administered on 5/20/20at 09:25;  Start 5/17/20 at 09:00;  Stop 5/21/20 at 

08:30;  Status DC


Potassium Chloride 90 meq/ Magnesium Sulfate 20 meq/ Multivitamins 10 

ml/Chromium/ Copper/Manganese/ Seleni/Zn 1 ml/ Insulin Human Regular 15 unit/ 

Total Parenteral Nutrition/Amino Acids/Dextrose/ Fat Emulsion Intravenous 1,890 

ml @  78.75 mls/ hr TPN  CONT IV  Last administered on 5/17/20at 21:34;  Start 

5/17/20 at 22:00;  Stop 5/18/20 at 21:59;  Status DC


Lorazepam (Ativan Inj) 2 mg STK-MED ONCE .ROUTE ;  Start 5/17/20 at 14:58;  Stop

5/17/20 at 14:58;  Status DC


Metoprolol Tartrate (Lopressor Vial) 5 mg 1X  ONCE IVP  Last administered on 

5/17/20at 15:31;  Start 5/17/20 at 15:15;  Stop 5/17/20 at 15:16;  Status DC


Lorazepam (Ativan Inj) 2 mg 1X  ONCE IVP  Last administered on 5/17/20at 15:30; 

Start 5/17/20 at 15:15;  Stop 5/17/20 at 15:16;  Status DC


Enoxaparin Sodium (Lovenox 40mg Syringe) 40 mg Q24H SQ  Last administered on 

6/5/20at 17:44;  Start 5/17/20 at 17:00;  Stop 6/7/20 at 06:50;  Status DC


Lorazepam (Ativan Inj) 1 mg PRN Q4HRS  PRN IVP ANXIETY / AGITATION MILD-MOD Last

administered on 5/31/20at 15:55;  Start 5/17/20 at 19:15;  Stop 6/2/20 at 11:45;

 Status DC


Lorazepam (Ativan Inj) 2 mg PRN Q4HRS  PRN IVP ANXIETY / AGITATION SEVERE Last 

administered on 6/1/20at 07:55;  Start 5/17/20 at 19:15;  Stop 6/2/20 at 11:45; 

Status DC


Fentanyl Citrate (Fentanyl 2ml Vial) 50 mcg PRN Q4HRS  PRN IVP SEVERE PAIN Last 

administered on 6/13/20at 05:15;  Start 5/18/20 at 13:15;  Stop 6/14/20 at 

09:29;  Status DC


Fentanyl Citrate (Fentanyl 2ml Vial) 25 mcg PRN Q4HRS  PRN IVP MODERATE PAIN 

Last administered on 6/13/20at 00:27;  Start 5/18/20 at 13:15;  Stop 6/14/20 at 

09:30;  Status DC


Potassium Chloride 90 meq/ Magnesium Sulfate 20 meq/ Multivitamins 10 

ml/Chromium/ Copper/Manganese/ Seleni/Zn 1 ml/ Insulin Human Regular 15 unit/ 

Total Parenteral Nutrition/Amino Acids/Dextrose/ Fat Emulsion Intravenous 1,890 

ml @  78.75 mls/ hr TPN  CONT IV  Last administered on 5/18/20at 22:18;  Start 

5/18/20 at 22:00;  Stop 5/19/20 at 21:59;  Status DC


Furosemide (Lasix) 40 mg 1X  ONCE IVP  Last administered on 5/18/20at 21:51;  

Start 5/18/20 at 21:45;  Stop 5/18/20 at 21:48;  Status DC


Albumin Human 100 ml @  100 mls/hr 1X PRN  PRN IV SEE COMMENTS;  Start 5/19/20 

at 01:30


Furosemide (Lasix) 40 mg [id number] IVP  Last administered on 6/3/20at 08:04;  Start 

5/19/20 at 14:00;  Stop 6/3/20 at 13:07;  Status DC


Potassium Chloride 90 meq/ Magnesium Sulfate 20 meq/ Multivitamins 10 

ml/Chromium/ Copper/Manganese/ Seleni/Zn 1 ml/ Insulin Human Regular 15 unit/ 

Total Parenteral Nutrition/Amino Acids/Dextrose/ Fat Emulsion Intravenous 1,800 

ml @  75 mls/hr TPN  CONT IV  Last administered on 5/19/20at 22:31;  Start 

5/19/20 at 22:00;  Stop 5/20/20 at 21:59;  Status DC


Potassium Chloride 90 meq/ Magnesium Sulfate 20 meq/ Multivitamins 10 

ml/Chromium/ Copper/Manganese/ Seleni/Zn 1 ml/ Insulin Human Regular 15 unit/ 

Total Parenteral Nutrition/Amino Acids/Dextrose/ Fat Emulsion Intravenous 1,800 

ml @  75 mls/hr TPN  CONT IV  Last administered on 5/20/20at 22:28;  Start 

5/20/20 at 22:00;  Stop 5/21/20 at 21:59;  Status DC


Potassium Chloride 110 meq/ Magnesium Sulfate 20 meq/ Multivitamins 10 

ml/Chromium/ Copper/Manganese/ Seleni/Zn 1 ml/ Insulin Human Regular 15 unit/ 

Total Parenteral Nutrition/Amino Acids/Dextrose/ Fat Emulsion Intravenous 1,800 

ml @  75 mls/hr TPN  CONT IV  Last administered on 5/21/20at 22:01;  Start 

5/21/20 at 22:00;  Stop 5/22/20 at 21:59;  Status DC


Saliva Substitute (Biotene Moisturizing Mouth) 2 spray PRN Q15MIN  PRN PO DRY 

MOUTH;  Start 5/21/20 at 11:00


Potassium Chloride 110 meq/ Magnesium Sulfate 20 meq/ Multivitamins 10 

ml/Chromium/ Copper/Manganese/ Seleni/Zn 1 ml/ Insulin Human Regular 15 unit/ 

Total Parenteral Nutrition/Amino Acids/Dextrose/ Fat Emulsion Intravenous 1,800 

ml @  75 mls/hr TPN  CONT IV  Last administered on 5/22/20at 22:21;  Start 

5/22/20 at 22:00;  Stop 5/23/20 at 21:59;  Status DC


Potassium Chloride 110 meq/ Magnesium Sulfate 20 meq/ Multivitamins 10 

ml/Chromium/ Copper/Manganese/ Seleni/Zn 1 ml/ Insulin Human Regular 15 unit/ 

Total Parenteral Nutrition/Amino Acids/Dextrose/ Fat Emulsion Intravenous 1,800 

ml @  75 mls/hr TPN  CONT IV  Last administered on 5/23/20at 22:04;  Start 

5/23/20 at 22:00;  Stop 5/24/20 at 21:59;  Status DC


Potassium Chloride 110 meq/ Magnesium Sulfate 20 meq/ Multivitamins 10 

ml/Chromium/ Copper/Manganese/ Seleni/Zn 1 ml/ Insulin Human Regular 15 unit/ 

Total Parenteral Nutrition/Amino Acids/Dextrose/ Fat Emulsion Intravenous 1,800 

ml @  75 mls/hr TPN  CONT IV  Last administered on 5/24/20at 22:48;  Start 

5/24/20 at 22:00;  Stop 5/25/20 at 21:59;  Status DC


Potassium Chloride 70 meq/ Magnesium Sulfate 20 meq/ Multivitamins 10 

ml/Chromium/ Copper/Manganese/ Seleni/Zn 1 ml/ Insulin Human Regular 15 unit/ 

Total Parenteral Nutrition/Amino Acids/Dextrose/ Fat Emulsion Intravenous 1,800 

ml @  75 mls/hr TPN  CONT IV  Last administered on 5/25/20at 21:39;  Start 

5/25/20 at 22:00;  Stop 5/26/20 at 21:59;  Status DC


Meropenem 500 mg/ Sodium Chloride 50 ml @  100 mls/hr Q6HRS IV  Last 

administered on 5/27/20at 06:02;  Start 5/25/20 at 18:00;  Stop 5/27/20 at 

09:59;  Status DC


Barium Sulfate (Varibar Thin Liquid Apple) 148 gm 1X  ONCE PO ;  Start 5/26/20 

at 11:45;  Stop 5/26/20 at 11:49;  Status DC


Potassium Chloride 70 meq/ Magnesium Sulfate 20 meq/ Multivitamins 10 

ml/Chromium/ Copper/Manganese/ Seleni/Zn 1 ml/ Insulin Human Regular 15 unit/ 

Total Parenteral Nutrition/Amino Acids/Dextrose/ Fat Emulsion Intravenous 1,800 

ml @  75 mls/hr TPN  CONT IV  Last administered on 5/26/20at 22:27;  Start 

5/26/20 at 22:00;  Stop 5/27/20 at 21:59;  Status DC


Piperacillin Sod/ Tazobactam Sod 3.375 gm/Sodium Chloride 50 ml @  100 mls/hr 

Q6HRS IV  Last administered on 6/4/20at 06:10;  Start 5/27/20 at 12:00;  Stop 

6/4/20 at 07:26;  Status DC


Potassium Chloride 70 meq/ Magnesium Sulfate 20 meq/ Multivitamins 10 

ml/Chromium/ Copper/Manganese/ Seleni/Zn 1 ml/ Insulin Human Regular 15 unit/ 

Total Parenteral Nutrition/Amino Acids/Dextrose/ Fat Emulsion Intravenous 1,800 

ml @  75 mls/hr TPN  CONT IV  Last administered on 5/27/20at 22:03;  Start 

5/27/20 at 22:00;  Stop 5/28/20 at 21:59;  Status DC


Potassium Chloride 70 meq/ Magnesium Sulfate 20 meq/ Multivitamins 10 

ml/Chromium/ Copper/Manganese/ Seleni/Zn 1 ml/ Insulin Human Regular 15 unit/ 

Total Parenteral Nutrition/Amino Acids/Dextrose/ Fat Emulsion Intravenous 1,800 

ml @  75 mls/hr TPN  CONT IV  Last administered on 5/28/20at 22:33;  Start 

5/28/20 at 22:00;  Stop 5/29/20 at 21:59;  Status DC


Potassium Chloride 70 meq/ Magnesium Sulfate 20 meq/ Multivitamins 10 

ml/Chromium/ Copper/Manganese/ Seleni/Zn 1 ml/ Insulin Human Regular 15 unit/ 

Total Parenteral Nutrition/Amino Acids/Dextrose/ Fat Emulsion Intravenous 1,800 

ml @  75 mls/hr TPN  CONT IV  Last administered on 5/29/20at 23:13;  Start 

5/29/20 at 22:00;  Stop 5/30/20 at 21:59;  Status DC


Potassium Chloride 80 meq/ Magnesium Sulfate 20 meq/ Multivitamins 10 

ml/Chromium/ Copper/Manganese/ Seleni/Zn 1 ml/ Insulin Human Regular 15 unit/ 

Total Parenteral Nutrition/Amino Acids/Dextrose/ Fat Emulsion Intravenous 1,800 

ml @  75 mls/hr TPN  CONT IV  Last administered on 5/30/20at 22:30;  Start 

5/30/20 at 22:00;  Stop 5/31/20 at 21:59;  Status DC


Potassium Chloride 80 meq/ Magnesium Sulfate 20 meq/ Multivitamins 10 

ml/Chromium/ Copper/Manganese/ Seleni/Zn 1 ml/ Insulin Human Regular 15 unit/ 

Total Parenteral Nutrition/Amino Acids/Dextrose/ Fat Emulsion Intravenous 1,800 

ml @  75 mls/hr TPN  CONT IV  Last administered on 5/31/20at 21:54;  Start 

5/31/20 at 22:00;  Stop 6/1/20 at 21:59;  Status DC


Potassium Chloride/Water 100 ml @  100 mls/hr 1X  ONCE IV  Last administered on 

6/1/20at 10:15;  Start 6/1/20 at 10:00;  Stop 6/1/20 at 10:59;  Status DC


Potassium Chloride 90 meq/ Magnesium Sulfate 20 meq/ Multivitamins 10 

ml/Chromium/ Copper/Manganese/ Seleni/Zn 1 ml/ Insulin Human Regular 20 unit/ 

Total Parenteral Nutrition/Amino Acids/Dextrose/ Fat Emulsion Intravenous 1,800 

ml @  75 mls/hr TPN  CONT IV  Last administered on 6/1/20at 22:28;  Start 6/1/20

at 22:00;  Stop 6/2/20 at 21:59;  Status DC


Potassium Chloride 90 meq/ Magnesium Sulfate 20 meq/ Multivitamins 10 

ml/Chromium/ Copper/Manganese/ Seleni/Zn 1 ml/ Insulin Human Regular 20 unit/ 

Total Parenteral Nutrition/Amino Acids/Dextrose/ Fat Emulsion Intravenous 1,800 

ml @  75 mls/hr TPN  CONT IV  Last administered on 6/2/20at 22:08;  Start 6/2/20

at 22:00;  Stop 6/3/20 at 21:59;  Status DC


Lorazepam (Ativan Inj) 0.25 mg PRN Q4HRS  PRN IVP ANXIETY / AGITATION Last admi

nistered on 6/27/20at 13:37;  Start 6/3/20 at 07:30


Potassium Chloride 90 meq/ Magnesium Sulfate 20 meq/ Multivitamins 10 ml/Chr

omium/ Copper/Manganese/ Seleni/Zn 1 ml/ Insulin Human Regular 20 unit/ Total 

Parenteral Nutrition/Amino Acids/Dextrose/ Fat Emulsion Intravenous 1,800 ml @  

75 mls/hr TPN  CONT IV  Last administered on 6/3/20at 23:13;  Start 6/3/20 at 

22:00;  Stop 6/4/20 at 21:59;  Status DC


Furosemide (Lasix) 40 mg DAILY IVP  Last administered on 6/5/20at 11:14;  Start 

6/3/20 at 13:30;  Stop 6/7/20 at 09:12;  Status DC


Fluoxetine HCl (PROzac) 20 mg QHS PEG  Last administered on 6/27/20at 21:52;  

Start 6/4/20 at 21:00


Fentanyl (Duragesic 50mcg/ Hr Patch) 1 patch Q72H TD  Last administered on 

6/4/20at 21:22;  Start 6/4/20 at 21:00;  Stop 6/13/20 at 12:00;  Status DC


Potassium Chloride 40 meq/ Potassium Acetate 60 meq/Magnesium Sulfate 10 meq/ 

Multivitamins 10 ml/Chromium/ Copper/Manganese/ Seleni/Zn 1 ml/ Insulin Human 

Regular 20 unit/ Total Parenteral Nutrition/Amino Acids/Dextrose/ Fat Emulsion 

Intravenous 1,800 ml @  75 mls/hr TPN  CONT IV  Last administered on 6/5/20at 

00:03;  Start 6/4/20 at 22:00;  Stop 6/5/20 at 21:59;  Status DC


Potassium Acetate 80 meq/Magnesium Sulfate 5 meq/ Multivitamins 10 ml/Chromium/ 

Copper/Manganese/ Seleni/Zn 1 ml/ Insulin Human Regular 20 unit/ Total 

Parenteral Nutrition/Amino Acids/Dextrose/ Fat Emulsion Intravenous 1,920 ml @  

80 mls/hr TPN  CONT IV  Last administered on 6/5/20at 21:59;  Start 6/5/20 at 

22:00;  Stop 6/6/20 at 21:59;  Status DC


Potassium Acetate 60 meq/Magnesium Sulfate 5 meq/ Multivitamins 10 ml/Chromium/ 

Copper/Manganese/ Seleni/Zn 1 ml/ Insulin Human Regular 30 unit/ Total 

Parenteral Nutrition/Amino Acids/Dextrose/ Fat Emulsion Intravenous 1,920 ml @  

80 mls/hr TPN  CONT IV  Last administered on 6/6/20at 21:54;  Start 6/6/20 at 

22:00;  Stop 6/7/20 at 21:59;  Status DC


Norepinephrine Bitartrate 8 mg/ Dextrose 258 ml @  13.332 mls/ hr CONT  PRN IV 

PER PROTOCOL Last administered on 6/7/20at 21:46;  Start 6/7/20 at 06:30


Albumin Human 500 ml @  125 mls/hr 1X  ONCE IV  Last administered on 6/7/20at 

08:10;  Start 6/7/20 at 08:15;  Stop 6/7/20 at 12:14;  Status DC


Potassium Acetate 40 meq/Magnesium Sulfate 5 meq/ Multivitamins 10 ml/Chromium/ 

Copper/Manganese/ Seleni/Zn 1 ml/ Insulin Human Regular 30 unit/ Total 

Parenteral Nutrition/Amino Acids/Dextrose/ Fat Emulsion Intravenous 1,920 ml @  

80 mls/hr TPN  CONT IV  Last administered on 6/7/20at 22:23;  Start 6/7/20 at 

22:00;  Stop 6/8/20 at 21:59;  Status DC


Meropenem 1 gm/ Sodium Chloride 100 ml @  200 mls/hr Q8HRS IV ;  Start 6/7/20 at

14:00;  Status Cancel


Meropenem 1 gm/ Sodium Chloride 100 ml @  200 mls/hr Q8HRS IV  Last administered

on 6/7/20at 11:04;  Start 6/7/20 at 10:00;  Stop 6/7/20 at 13:00;  Status DC


Meropenem 1 gm/ Sodium Chloride 100 ml @  200 mls/hr Q12HR IV  Last administered

on 6/25/20at 08:27;  Start 6/7/20 at 21:00;  Stop 6/25/20 at 08:56;  Status DC


Sodium Chloride 1,000 ml @  1,000 mls/hr 1X  ONCE IV  Last administered on 

6/7/20at 11:06;  Start 6/7/20 at 10:45;  Stop 6/7/20 at 11:44;  Status DC


Micafungin Sodium 100 mg/Dextrose 100 ml @  100 mls/hr Q24H IV  Last 

administered on 6/24/20at 12:34;  Start 6/7/20 at 11:00;  Stop 6/25/20 at 08:56;

 Status DC


Daptomycin 410 mg/ Sodium Chloride 50 ml @  100 mls/hr Q24H IV  Last 

administered on 6/9/20at 13:33;  Start 6/7/20 at 14:00;  Stop 6/10/20 at 08:30; 

Status DC


Midazolam HCl (Versed) 2 mg STK-MED ONCE .ROUTE ;  Start 6/7/20 at 14:47;  Stop 

6/7/20 at 14:48;  Status DC


Fentanyl Citrate (Fentanyl 2ml Vial) 100 mcg STK-MED ONCE .ROUTE ;  Start 6/7/20

at 14:47;  Stop 6/7/20 at 14:48;  Status DC


Flumazenil (Romazicon) 0.5 mg STK-MED ONCE IV ;  Start 6/7/20 at 14:48;  Stop 6 /7/20 at 14:48;  Status DC


Naloxone HCl (Narcan) 0.4 mg STK-MED ONCE .ROUTE ;  Start 6/7/20 at 14:48;  Stop

6/7/20 at 14:48;  Status DC


Lidocaine HCl (Lidocaine 1% 20ml Vial) 20 ml STK-MED ONCE .ROUTE ;  Start 6/7/20

at 14:48;  Stop 6/7/20 at 14:48;  Status DC


Midazolam HCl (Versed) 2 mg 1X  ONCE IV  Last administered on 6/7/20at 15:28;  

Start 6/7/20 at 15:00;  Stop 6/7/20 at 15:01;  Status DC


Fentanyl Citrate (Fentanyl 2ml Vial) 100 mcg 1X  ONCE IV  Last administered on 

6/7/20at 15:28;  Start 6/7/20 at 15:00;  Stop 6/7/20 at 15:01;  Status DC


Lidocaine HCl (Lidocaine 1% 20ml Vial) 20 ml 1X  ONCE INJ  Last administered on 

6/7/20at 15:30;  Start 6/7/20 at 15:00;  Stop 6/7/20 at 15:01;  Status DC


Sodium Chloride 1,000 ml @  100 mls/hr Q10H IV  Last administered on 6/16/20at 

07:30;  Start 6/7/20 at 20:00;  Stop 6/16/20 at 11:26;  Status DC


Sodium Bicarbonate (Sodium Bicarb Adult 8.4% Syr) 50 meq 1X  ONCE IV  Last 

administered on 6/7/20at 21:47;  Start 6/7/20 at 22:00;  Stop 6/7/20 at 22:01;  

Status DC


Potassium Acetate 40 meq/Magnesium Sulfate 5 meq/ Multivitamins 10 ml/Chromium/ 

Copper/Manganese/ Seleni/Zn 1 ml/ Insulin Human Regular 30 unit/ Total 

Parenteral Nutrition/Amino Acids/Dextrose/ Fat Emulsion Intravenous 1,920 ml @  

80 mls/hr TPN  CONT IV  Last administered on 6/8/20at 22:28;  Start 6/8/20 at 

22:00;  Stop 6/9/20 at 21:59;  Status DC


Sodium Chloride 500 ml @  500 mls/hr 1X  ONCE IV  Last administered on 6/9/20at 

06:39;  Start 6/9/20 at 06:45;  Stop 6/9/20 at 07:44;  Status DC


Potassium Acetate 40 meq/Magnesium Sulfate 5 meq/ Multivitamins 10 ml/Chromium/ 

Copper/Manganese/ Seleni/Zn 1 ml/ Insulin Human Regular 30 unit/ Total Pare

nteral Nutrition/Amino Acids/Dextrose/ Fat Emulsion Intravenous 1,920 ml @  80 

mls/hr TPN  CONT IV  Last administered on 6/9/20at 22:03;  Start 6/9/20 at 

22:00;  Stop 6/10/20 at 21:59;  Status DC


Metoprolol Tartrate (Lopressor Vial) 5 mg PRN Q6HRS  PRN IVP HYPERTENSION Last 

administered on 6/18/20at 10:14;  Start 6/10/20 at 09:00


Potassium Acetate 40 meq/Magnesium Sulfate 5 meq/ Multivitamins 10 ml/Chromium/ 

Copper/Manganese/ Seleni/Zn 1 ml/ Insulin Human Regular 30 unit/ Total 

Parenteral Nutrition/Amino Acids/Dextrose/ Fat Emulsion Intravenous 1,920 ml @  

80 mls/hr TPN  CONT IV  Last administered on 6/10/20at 21:26;  Start 6/10/20 at 

22:00;  Stop 6/11/20 at 21:59;  Status DC


Potassium Acetate 40 meq/Magnesium Sulfate 5 meq/ Multivitamins 10 ml/Chromium/ 

Copper/Manganese/ Seleni/Zn 1 ml/ Insulin Human Regular 30 unit/ Total 

Parenteral Nutrition/Amino Acids/Dextrose/ Fat Emulsion Intravenous 1,920 ml @  

80 mls/hr TPN  CONT IV  Last administered on 6/11/20at 23:23;  Start 6/11/20 at 

22:00;  Stop 6/12/20 at 21:59;  Status DC


Potassium Acetate 40 meq/Magnesium Sulfate 5 meq/ Multivitamins 10 ml/Chromium/ 

Copper/Manganese/ Seleni/Zn 1 ml/ Insulin Human Regular 30 unit/ Total 

Parenteral Nutrition/Amino Acids/Dextrose/ Fat Emulsion Intravenous 1,920 ml @  

80 mls/hr TPN  CONT IV  Last administered on 6/12/20at 21:35;  Start 6/12/20 at 

22:00;  Stop 6/13/20 at 21:59;  Status DC


Furosemide (Lasix) 20 mg 1X  ONCE IVP  Last administered on 6/13/20at 06:26;  

Start 6/13/20 at 06:15;  Stop 6/13/20 at 06:16;  Status DC


Methylprednisolone Sodium Succinate (SOLU-Medrol 125MG VIAL) 125 mg 1X  ONCE IV 

Last administered on 6/13/20at 06:26;  Start 6/13/20 at 06:15;  Stop 6/13/20 at 

06:16;  Status DC


Albuterol/ Ipratropium (Duoneb) 3 ml Q4HRS NEB  Last administered on 6/29/20at 

04:15;  Start 6/13/20 at 08:00


Fentanyl Citrate 30 ml @ 0 mls/hr CONT  PRN IV SEE PROTOCOL Last administered on

6/27/20at 23:26;  Start 6/13/20 at 06:00


Propofol 100 ml @ 0 mls/hr CONT  PRN IV SEE PROTOCOL Last administered on 

6/20/20at 23:50;  Start 6/13/20 at 06:00


Fentanyl Citrate (Fentanyl 2ml Vial) 25 mcg PRN Q1HR  PRN IV SEE COMMENTS;  

Start 6/13/20 at 06:00


Fentanyl Citrate (Fentanyl 2ml Vial) 50 mcg PRN Q1HR  PRN IV SEE COMMENTS;  

Start 6/13/20 at 06:00


Chlorhexidine Gluconate (Peridex) 15 ml BID MM ;  Start 6/13/20 at 09:00;  Stop 

6/13/20 at 07:58;  Status DC


Potassium Acetate 40 meq/Magnesium Sulfate 5 meq/ Multivitamins 10 ml/Chromium/ 

Copper/Manganese/ Seleni/Zn 1 ml/ Insulin Human Regular 30 unit/ Total 

Parenteral Nutrition/Amino Acids/Dextrose/ Fat Emulsion Intravenous 1,920 ml @  

80 mls/hr TPN  CONT IV  Last administered on 6/13/20at 21:19;  Start 6/13/20 at 

22:00;  Stop 6/14/20 at 21:59;  Status DC


Acetylcysteine (Mucomyst 20% Resp Treatment) 600 mg BID NEB  Last administered 

on 6/19/20at 09:33;  Start 6/13/20 at 21:00;  Stop 6/19/20 at 10:39;  Status DC


Magnesium Sulfate 100 ml @  25 mls/hr 1X  ONCE IV  Last administered on 

6/13/20at 15:48;  Start 6/13/20 at 15:45;  Stop 6/13/20 at 19:44;  Status DC


Potassium Acetate 40 meq/Magnesium Sulfate 5 meq/ Multivitamins 10 ml/Chromium/ 

Copper/Manganese/ Seleni/Zn 1 ml/ Insulin Human Regular 30 unit/ Total 

Parenteral Nutrition/Amino Acids/Dextrose/ Fat Emulsion Intravenous 1,920 ml @  

80 mls/hr TPN  CONT IV  Last administered on 6/14/20at 21:35;  Start 6/14/20 at 

22:00;  Stop 6/15/20 at 21:59;  Status DC


Potassium Chloride/Water 100 ml @  100 mls/hr Q1H IV  Last administered on 

6/15/20at 08:31;  Start 6/15/20 at 07:00;  Stop 6/15/20 at 08:59;  Status DC


Potassium Acetate 40 meq/Magnesium Sulfate 5 meq/ Multivitamins 10 ml/Chromium/ 

Copper/Manganese/ Seleni/Zn 1 ml/ Insulin Human Regular 30 unit/ Total 

Parenteral Nutrition/Amino Acids/Dextrose/ Fat Emulsion Intravenous 1,920 ml @  

80 mls/hr TPN  CONT IV  Last administered on 6/15/20at 21:54;  Start 6/15/20 at 

22:00;  Stop 6/16/20 at 19:34;  Status DC


Lidocaine HCl (Buffered Lidocaine 1%) 3 ml STK-MED ONCE .ROUTE ;  Start 6/15/20 

at 12:14;  Stop 6/15/20 at 12:14;  Status DC


Lidocaine HCl (Buffered Lidocaine 1%) 3 ml 1X  ONCE IJ  Last administered on 

6/15/20at 13:11;  Start 6/15/20 at 13:00;  Stop 6/15/20 at 13:01;  Status DC


Magnesium Sulfate 50 ml @ 25 mls/hr 1X  ONCE IV ;  Start 6/16/20 at 08:15;  Stop

6/16/20 at 10:14;  Status DC


Potassium Acetate 40 meq/Magnesium Sulfate 10 meq/ Multivitamins 10 ml/Chromium/

Copper/Manganese/ Seleni/Zn 1 ml/ Insulin Human Regular 20 unit/ Total 

Parenteral Nutrition/Amino Acids/Dextrose/ Fat Emulsion Intravenous 1,920 ml @  

80 mls/hr TPN  CONT IV  Last administered on 6/16/20at 21:32;  Start 6/16/20 at 

22:00;  Stop 6/17/20 at 21:59;  Status DC


Potassium Chloride/Water 100 ml @  100 mls/hr Q1H IV  Last administered on 

6/17/20at 09:12;  Start 6/17/20 at 08:00;  Stop 6/17/20 at 09:59;  Status DC


Alteplase, Recombinant (Cathflo For Central Catheter Clearance) 4 mg 1X  ONCE 

INT CAT ;  Start 6/17/20 at 09:15;  Stop 6/17/20 at 09:16;  Status UNV


Alteplase, Recombinant (Cathflo For Central Catheter Clearance) 4 mg 1X  ONCE 

INT CAT ;  Start 6/17/20 at 09:15;  Stop 6/17/20 at 09:16;  Status UNV


Alteplase, Recombinant (Cathflo For Central Catheter Clearance) 4 mg 1X  ONCE 

INT CAT ;  Start 6/17/20 at 09:15;  Stop 6/17/20 at 09:16;  Status UNV


Alteplase, Recombinant 4 mg/ Sodium Chloride 20 ml @ 20 mls/hr 1X  ONCE IV  Last

administered on 6/17/20at 10:10;  Start 6/17/20 at 10:00;  Stop 6/17/20 at 

10:59;  Status DC


Alteplase, Recombinant 4 mg/ Sodium Chloride 20 ml @ 20 mls/hr 1X  ONCE IV  Last

administered on 6/17/20at 10:09;  Start 6/17/20 at 10:00;  Stop 6/17/20 at 

10:59;  Status DC


Alteplase, Recombinant 4 mg/ Sodium Chloride 20 ml @ 20 mls/hr 1X  ONCE IV  Last

administered on 6/17/20at 10:09;  Start 6/17/20 at 10:00;  Stop 6/17/20 at 

10:59;  Status DC


Potassium Acetate 60 meq/Magnesium Sulfate 10 meq/ Multivitamins 10 ml/Chromium/

Copper/Manganese/ Seleni/Zn 1 ml/ Insulin Human Regular 20 unit/ Total 

Parenteral Nutrition/Amino Acids/Dextrose/ Fat Emulsion Intravenous 1,920 ml @  

80 mls/hr TPN  CONT IV  Last administered on 6/17/20at 21:55;  Start 6/17/20 at 

22:00;  Stop 6/18/20 at 21:59;  Status DC


Albumin Human 500 ml @  125 mls/hr 1X  ONCE IV  Last administered on 6/18/20at 

12:01;  Start 6/18/20 at 11:15;  Stop 6/18/20 at 15:14;  Status DC


Sodium Chloride 500 ml @  500 mls/hr 1X  ONCE IV  Last administered on 6/18/20at

13:50;  Start 6/18/20 at 11:15;  Stop 6/18/20 at 12:14;  Status DC


Potassium Acetate 60 meq/Magnesium Sulfate 14 meq/ Multivitamins 10 ml/Chromium/

Copper/Manganese/ Seleni/Zn 1 ml/ Insulin Human Regular 20 unit/ Total 

Parenteral Nutrition/Amino Acids/Dextrose/ Fat Emulsion Intravenous 1,920 ml @  

80 mls/hr TPN  CONT IV  Last administered on 6/18/20at 22:26;  Start 6/18/20 at 

22:00;  Stop 6/19/20 at 21:59;  Status DC


Ciprofloxacin/ Dextrose 200 ml @  200 mls/hr Q12HR IV  Last administered on 

6/25/20at 08:27;  Start 6/18/20 at 21:00;  Stop 6/25/20 at 08:56;  Status DC


Albumin Human 250 ml @  62.5 mls/hr 1X  ONCE IV  Last administered on 6/19/20at 

11:09;  Start 6/19/20 at 11:00;  Stop 6/19/20 at 14:59;  Status DC


Furosemide (Lasix) 20 mg 1X  ONCE IVP  Last administered on 6/19/20at 14:52;  

Start 6/19/20 at 10:45;  Stop 6/19/20 at 10:49;  Status DC


Potassium Acetate 60 meq/Magnesium Sulfate 14 meq/ Multivitamins 10 ml/Chromium/

Copper/Manganese/ Seleni/Zn 1 ml/ Insulin Human Regular 15 unit/ Total 

Parenteral Nutrition/Amino Acids/Dextrose/ Fat Emulsion Intravenous 1,920 ml @  

80 mls/hr TPN  CONT IV  Last administered on 6/19/20at 22:08;  Start 6/19/20 at 

22:00;  Stop 6/20/20 at 21:59;  Status DC


Potassium Acetate 60 meq/Magnesium Sulfate 14 meq/ Multivitamins 10 ml/Chromium/

Copper/Manganese/ Seleni/Zn 1 ml/ Insulin Human Regular 15 unit/ Total 

Parenteral Nutrition/Amino Acids/Dextrose/ Fat Emulsion Intravenous 1,920 ml @  

80 mls/hr TPN  CONT IV  Last administered on 6/20/20at 22:12;  Start 6/20/20 at 

22:00;  Stop 6/21/20 at 21:59;  Status DC


Potassium Acetate 60 meq/Magnesium Sulfate 14 meq/ Multivitamins 10 ml/Chromium/

Copper/Manganese/ Seleni/Zn 1 ml/ Insulin Human Regular 15 unit/ Total 

Parenteral Nutrition/Amino Acids/Dextrose/ Fat Emulsion Intravenous 1,920 ml @  

80 mls/hr TPN  CONT IV  Last administered on 6/21/20at 22:22;  Start 6/21/20 at 

22:00;  Stop 6/22/20 at 21:59;  Status DC


Furosemide (Lasix) 20 mg 1X  ONCE IVP  Last administered on 6/22/20at 11:07;  

Start 6/22/20 at 10:30;  Stop 6/22/20 at 10:34;  Status DC


Potassium Acetate 60 meq/Magnesium Sulfate 14 meq/ Multivitamins 10 ml/Chromium/

Copper/Manganese/ Seleni/Zn 1 ml/ Insulin Human Regular 15 unit/ Sodium Chloride

20 meq/Total Parenteral Nutrition/Amino Acids/Dextrose/ Fat Emulsion Intravenous

1,920 ml @  80 mls/hr TPN  CONT IV  Last administered on 6/22/20at 21:54;  Start

6/22/20 at 22:00;  Stop 6/23/20 at 21:59;  Status DC


Potassium Acetate 30 meq/Magnesium Sulfate 14 meq/ Multivitamins 10 ml/Chromium/

Copper/Manganese/ Seleni/Zn 1 ml/ Insulin Human Regular 15 unit/ Sodium Chloride

20 meq/Potassium Chloride 30 meq/ Total Parenteral Nutrition/Amino 

Acids/Dextrose/ Fat Emulsion Intravenous 1,920 ml @  80 mls/hr TPN  CONT IV  

Last administered on 6/23/20at 21:46;  Start 6/23/20 at 22:00;  Stop 6/24/20 at 

21:59;  Status DC


Sodium Chloride 80 meq/Potassium Chloride 30 meq/ Potassium Acetate 30 

meq/Magnesium Sulfate 14 meq/ Multivitamins 10 ml/Chromium/ Copper/Manganese/ 

Seleni/Zn 1 ml/ Insulin Human Regular 15 unit/ Total Parenteral Nutrition/Amino 

Acids/Dextrose/ Fat Emulsion Intravenous 1,920 ml @  80 mls/hr TPN  CONT IV  Las

t administered on 6/24/20at 22:33;  Start 6/24/20 at 22:00;  Stop 6/25/20 at 

21:59;  Status DC


Furosemide (Lasix) 40 mg 1X  ONCE IVP  Last administered on 6/24/20at 16:27;  

Start 6/24/20 at 15:30;  Stop 6/24/20 at 15:33;  Status DC


Albumin Human 250 ml @  62.5 mls/hr 1X  ONCE IV  Last administered on 6/24/20at 

16:27;  Start 6/24/20 at 15:30;  Stop 6/24/20 at 19:29;  Status DC


Sodium Chloride 80 meq/Potassium Chloride 30 meq/ Potassium Acetate 30 

meq/Magnesium Sulfate 14 meq/ Multivitamins 10 ml/Chromium/ Copper/Manganese/ 

Seleni/Zn 1 ml/ Insulin Human Regular 15 unit/ Total Parenteral Nutrition/Amino 

Acids/Dextrose/ Fat Emulsion Intravenous 1,920 ml @  80 mls/hr TPN  CONT IV  

Last administered on 6/25/20at 22:25;  Start 6/25/20 at 22:00;  Stop 6/26/20 at 

21:59;  Status DC


Sodium Chloride 80 meq/Potassium Chloride 30 meq/ Potassium Acetate 30 

meq/Magnesium Sulfate 14 meq/ Multivitamins 10 ml/Chromium/ Copper/Manganese/ 

Seleni/Zn 1 ml/ Insulin Human Regular 15 unit/ Total Parenteral Nutrition/Amino 

Acids/Dextrose/ Fat Emulsion Intravenous 1,920 ml @  80 mls/hr TPN  CONT IV  

Last administered on 6/26/20at 21:32;  Start 6/26/20 at 22:00;  Stop 6/27/20 at 

21:59;  Status DC


Sodium Chloride 80 meq/Potassium Chloride 30 meq/ Potassium Acetate 30 

meq/Magnesium Sulfate 14 meq/ Multivitamins 10 ml/Chromium/ Copper/Manganese/ 

Seleni/Zn 1 ml/ Insulin Human Regular 15 unit/ Total Parenteral Nutrition/Amino 

Acids/Dextrose/ Fat Emulsion Intravenous 1,920 ml @  80 mls/hr TPN  CONT IV  

Last administered on 6/27/20at 21:53;  Start 6/27/20 at 22:00;  Stop 6/28/20 at 

21:59;  Status DC


Acetylcysteine (Mucomyst 20% Resp Treatment) 600 mg RTBID NEB  Last administered

on 6/28/20at 19:40;  Start 6/27/20 at 12:00


Sodium Chloride 80 meq/Potassium Chloride 30 meq/ Potassium Acetate 30 meq/M

agnesium Sulfate 14 meq/ Multivitamins 10 ml/Chromium/ Copper/Manganese/ 

Seleni/Zn 1 ml/ Insulin Human Regular 15 unit/ Total Parenteral Nutrition/Amino 

Acids/Dextrose/ Fat Emulsion Intravenous 1,920 ml @  80 mls/hr TPN  CONT IV  

Last administered on 6/28/20at 22:06;  Start 6/28/20 at 22:00;  Stop 6/29/20 at 

21:59


Meropenem 500 mg/ Sodium Chloride 50 ml @  100 mls/hr Q6HRS IV  Last 

administered on 6/29/20at 05:54;  Start 6/28/20 at 18:00


Daptomycin 500 mg/ Sodium Chloride 50 ml @  100 mls/hr Q24H IV  Last a

dministered on 6/28/20at 20:03;  Start 6/28/20 at 19:00





Active Scripts


Active


Reported


Bisoprolol Fumarate 5 Mg Tablet 10 Mg PO DAILY


Vitals/I & O





Vital Sign - Last 24 Hours








 6/28/20 6/28/20 6/28/20 6/28/20





 08:45 09:00 10:00 11:00


 


Pulse  110 108 106


 


Resp  30 32 28


 


B/P (MAP)  123/72 (89) 130/70 (90) 104/66 (79)


 


Pulse Ox 100 100 100 100


 


O2 Delivery Tracheal Collar Tracheal Collar Tracheal Collar Tracheal Collar


 


O2 Flow Rate 8.0   





 6/28/20 6/28/20 6/28/20 6/28/20





 11:58 12:00 12:15 13:00


 


Temp  98.9  





  98.9  


 


Pulse  109  109


 


Resp  26 24


 


B/P (MAP)  113/72 (86)  117/75 (89)


 


Pulse Ox  100 100 100


 


O2 Delivery Trach Collar Tracheal Collar Tracheal Collar Tracheal Collar


 


O2 Flow Rate   8.0 


 


    





    





 6/28/20 6/28/20 6/28/20 6/28/20





 14:00 15:00 16:00 16:00


 


Temp    





    


 


Pulse 112 118  122


 


Resp 26 26 24


 


B/P (MAP) 126/75 (92) 121/86 (98)  138/77 (97)


 


Pulse Ox 100 100  100


 


O2 Delivery Tracheal Collar Tracheal Collar Trach Collar Tracheal Collar


 


    





    





 6/28/20 6/28/20 6/28/20 6/28/20





 16:10 17:00 18:00 19:00


 


Temp  101.3  





  101.3  


 


Pulse  127 128 132


 


Resp  26 28 30


 


B/P (MAP)  110/68 (82) 119/69 (86) 129/80 (96)


 


Pulse Ox 98 100 100 97


 


O2 Delivery Tracheal Collar Tracheal Collar Tracheal Collar Tracheal Collar


 


O2 Flow Rate 8.0   


 


    





    





 6/28/20 6/28/20 6/28/20 6/28/20





 19:40 20:00 20:00 21:00


 


Temp  101.4  





  101.4  


 


Pulse  134  140


 


Resp  31  48


 


B/P (MAP)  133/60 (84)  133/81 (98)


 


Pulse Ox 96 93  95


 


O2 Delivery Tracheal Collar Tracheal Collar Trach Collar Tracheal Collar


 


O2 Flow Rate 8.0  8.0 


 


    





    





 6/28/20 6/28/20 6/29/20 6/29/20





 22:00 23:00 00:00 00:00


 


Temp   99.7 





   99.7 


 


Pulse 130 120 117 


 


Resp 27 30 29 


 


B/P (MAP) 122/58 (79) 120/59 (79) 107/62 (77) 


 


Pulse Ox 95 92 96 


 


O2 Delivery Tracheal Collar Tracheal Collar Tracheal Collar Trach Collar


 


O2 Flow Rate    8.0


 


    





    





 6/29/20 6/29/20 6/29/20 6/29/20





 01:00 01:12 02:00 03:00


 


Pulse 111  108 103


 


Resp 37  37 19


 


B/P (MAP) 86/64 (71)  102/60 (74) 102/64 (77)


 


Pulse Ox 97 96 97 98


 


O2 Delivery Tracheal Collar Tracheal Collar Tracheal Collar Tracheal Collar


 


O2 Flow Rate  8.0  





 6/29/20 6/29/20 6/29/20 6/29/20





 04:00 04:00 04:15 05:00


 


Temp 97.9   





 97.9   


 


Pulse 103   102


 


Resp 21   26


 


B/P (MAP) 109/65 (80)   114/72 (86)


 


Pulse Ox 99  100 98


 


O2 Delivery Tracheal Collar Trach Collar Tracheal Collar Tracheal Collar


 


O2 Flow Rate  8.0 8.0 


 


    





    





 6/29/20 6/29/20 6/29/20 





 06:00 07:00 08:00 


 


Temp   98.0 





   98.0 


 


Pulse 108 116 119 


 


Resp 28 24 24 


 


B/P (MAP) 119/89 (99) 120/77 (91) 113/79 (90) 


 


Pulse Ox 98 98 99 


 


O2 Delivery Tracheal Collar Tracheal Collar Tracheal Collar 














Intake and Output   


 


 6/28/20 6/28/20 6/29/20





 15:00 23:00 07:00


 


Intake Total  1066 ml 1232 ml


 


Output Total 415 ml 705 ml 550 ml


 


Balance -415 ml 361 ml 682 ml











Justicifation of Admission Dx:


Justifications for Admission:


Justification of Admission Dx:  Yes











CLEMENTINA PANTOJA MD           Jun 29, 2020 08:31

## 2020-06-29 NOTE — PDOC
PROGRESS NOTES


Chief Complaint


Chief Complaint





History of Present Illness


48yo F w/ PMHx HTN, prediabetes who presented the emergency room complaints of 

abdominal pain. Patient described off and on 3 days. She states is constant, 

described as a squeezing sensation in a band-like distribution. + nausea, 

vomiting.  She denies any fever or diarrhea.  Patient denies any abdominal 

surgical procedures.  She stateed is worse with movements, car ride.  Pain 

initially was upper abdomen however now pretty much generalized.  Last bowel 

movement was 3/15/2020. Nothing makes her pain better.  Patient denies any 

shortness of breath.  She does state the pain moves into her chest.  Denies any 

headache or visual changes.


Lipase [id number], , , Bilirubin 1.4.


CT abdomen confirms pancreatic inflammation, peripancreatic fluid and 

inflammatory changes around the pancreas consistent with pancreatitis. 

Cholelithiasis and 1.4cm uterine fibroid as well as possible left salpingitis. 

Admitted for further care


Gallstone pancreatitis with necrosis. 


   -CT A/P 6/6 showed multiple pseudocysts, slight larger on the right. s/p 

drains x 3, 6/7.  + PSAE (MDRO-R Cefepime, Zosyn ANSON < 64) and yeast, 


   -s/p drain 4/27. C. parapsilosis. s/p drain 5/6 + yeast & high amylase; s/p 

additional drain on 5/8. Drains removed. 


Ascites s/p paracentesis 4/15 & 5/6. C. parapsilosis 


JED. off HD. 





Impression and plan:


Acute hypoxic Respiratory failure required  mechanical ventilation


Tracheostomy


bilateral pleural effusions/pulm edema s/p Throacentesis on 6/15/2020


Severe Acute gallstone pancreatitis (not a surgical candidate at this time) with

necrosis


Acute kidney failure now requiring dialysis


Gallstones (Calculus of gallbladder with acute cholecystitis without 

obstruction)


HTN 


Intractable pain


Intractable nausea


Covid 19 negative. 


Acute on chronic anemia 


EEG: No seizure activityFever  - better currently - intermittent could be from 

underlying pancreatitis blood cults 5/4 - neg so far


? Ileus with vomiting


Abd distention - U/S and CT reviewed s/p 0.4 L of opaque, debris-containing 

ascites was removed 5/6


Acute pancreatitis with persistent necrosis


Gallstone pancreatitis with necrosis. 


   -CT A/P 6/6 showed multiple pseudocysts, slight larger on the right. s/p 

drains x 3, 6/7.  + PSAE (MDRO-R Cefepime, Zosyn ANSON < 64) and yeast, 


   -s/p drain 4/27. C. parapsilosis. s/p drain 5/6 + yeast & high amylase; s/p 

additional drain on 5/8. Drains removed. 


Ascites s/p paracentesis 4/15 & 5/6. C. parapsilosis 


JED. off HD. 


A large fluid collection in the pancreatic bed has slightly decreased in size, 

described below, the pancreas itself is difficult to  visualize, which could be 

due to necrosis or obscuration of pancreatic  parenchyma from the surrounding 

fluid collection.6/15 


- 4/27 status post ROBERT drain placement + C paropsilosis. s/p additional drains 5/ 8


Anemia - S/p PRBCs


Cholelithiasis with thickening of the gallbladder wall.


Leucocytosis improving


JED, hyperkalemia, Metabolic acidosis off dialysis


hypocalcemia 


Prediabetes


HTN


s/p trach


ESRD on HD


Hyperglycemia


severe protein-caloric malnutrition


Moderate to large left pleural effusion with atelectasis and collapse  of most 

of the left lower lobe, stable





FEN - albumin, NS at 80 cc/hr, TPN


will start Mucomyst for secretions


Off antibiotics 


PPX - SCDs, off lovenox currently, high risk for thrombosis but in light of her 

recent anemia and need for transfusion the risks do not outweigh benefits at 

this time. will continue to evaluate on a daily basis


FULL CODE


Dispo - ICU, critically ill


Poor prognosis


























37 MIN CC TIME





History of Present Illness


History of Present Illness


6/8: IR placed drain on 6/7. 4u PRBC after Hb drop. Hb 8.8 today. Off Levophed 

this morning. T-max 100.3. Much more lethargic today. CXR with left sided 

diffuse infiltrates.


6/9: Tachycardic overnight into the 140s. NGT clamped. On BIPAP currently. 

Drains with serosanguinous discharge. WBC 8, Tmax 99.6F.


6/10: Seen on [facility] shield in ICU.  Hypertensive and tachycardic. Labs stable. 

blood stained drainage from drains. Afebrile.


6/11: Seen on [facility] shield in ICU. She is a bit confused, drowsy, but when 

sitting up is conversational and confusion somewhat clears. She is asking for 

more pain medication. Stable drains, still very tachy.Na 147


6/12: Patient vomited overnight. Aspirated. Tried to pull her trach out, she was

told she would die without her trach, she said "I know, I just want to go home".

Hb 7.6. Afebrile, still very tachycardic. 1055ml out of right sided ROBERT drain


6/13: Overnight hypoxic, on BIPAP. CXR with left sided white out lung. Sig

nificant mucous plug suctioned by RT with improvement in her ABG after 2 hours 

this morning. Not really active, tired, lethargic. 890ml out of drains past 24 

hours. On vent. D/w daughter bedside.


6/14: Still on vent overnight with copious pulmonary drainage on suctioning. 

Labs stable, UOP stable 1L. Drain output still significant with 1505mL. She has 

shown her phone password [phone] and made it clear she does not want her daughters 

to access her phone at this time.


6:15: Patient quite frail, she has had such a lengthy hospital stay that she is 

certainly depressed and as documented 3 days ago is wanting to go home. Most 

likely patient is also tired of being hospitalized with an end point no where in

sight. Reassurance and encouragement provided during my visit. Plans for 

thoracentesis later in the day 


6/16: No acute events reported overnight, case discussed with nursing staff 

patient in no acute distress, complaints of the abdomimal pain during my visit, 

patient is quite depressed, reassurance has been provided, discussed with 

nursing staff at bedside, replace electrolytes 


6/17 off vent / on TS , no distress








6/29 /2020


fever overnight, blood cult, back on iv merem


Patient seen and examined ICU BED


plan OR 6/30  for necrosectomy, cholecystectomy and feeding tube placement.


guarded-long-term prognosis 


Sputum from 6/13 - growing PSA - may be colonization I to Meropenem 


Continue meropenem, has MDRP PSAE June 7 from abd


bleeding from Sx. site RLQ and firmness)stable


oncology consulted   


Cholelithiasis. Mild thickened appearance of the gallbladder wall. sono 6/25  

Mild right hydronephrosis.Fluid collection identified anterior to the pancreas. 

   


  


  





6/26/2020


Patient having trouble with secretions this morning, no other complaints, 

discussed with surgical ARNP. Plans for OR on Tuesday. No fever above 99.9 

overnight, remains tachycardic, medications reviewed.





6/27/2020


Patient with thick secretions, no other acute events reported overnight.  

Patient seems to be quite anxious, I have tried to provide reassurance during my

encounter regarding her upcoming surgical procedure.  Patient seems to be quite 

anxious and seems to experience panic attacks when she first wakes up thinking 

that is the day of surgery.  At the present time she seems to be medically 

optimized for planned surgery, discussed with nursing staff at bedside





6/29/2020





Patient seems to be hallucinating and having probably memories from her former 

life prior to this prolonged hospital stay which is 3 months plus in length.  

Immediate plans are for surgical intervention on Tuesday which will consist of a

Whipple procedure.  At the present time the patient remains critically stable 

her prognosis is quite guarded at best.  fever last night  hallucinations 

continues to have chest tube in multiple abdominal drains as well.  All these 

are probable source of infection 


plan OR 6/30  for necrosectomy, cholecystectomy and feeding tube placement.





34 min cc time


  


      




















Date:  Apr 27, 2020





Pre-Op Diagnosis:


Necrotizing pancreatitis





Post-Op Diagnosis:


same





Procedure Performed:


laparoscopic exploration





Surgeon:


Frandy Flores


Asst:  Dr. Luis Santos





Anesthesia Type:


GETA plus local





Blood Loss:


50





Specimans Obtained:


cultures, debris





Findings:


1000 cc ascites suctioned off, cultures sent, diffuse debris, obliteration of 

surgical planes preventing any meaningful exploration




















 


 





 








6/1/2020


Patient seen and examined in the ICU


She appears extremely ill


She is tachypneic at 35 respirations per minute and tachycardic at 132 bpm


She is extremely encephalopathic and shaky


She appears clammy


Chart reviewed


Discussed with RN


Prognosis extremely guarded at best








5/31/2020


Patient still in ICU


Resting with no apparent distress


Chart reviewed








5/30/2020


Patient seen and examined in the ICU


She is wiping her face with a cough


Discussed with RN


Chart reviewed


We hope to get her out of the ICU later today if possible








5/29/2020


Patient seen and examined in the ICU once again


She is back on NG suction


On IV Zosyn


Has IV TPN


Sedated with Precedex but anxious still


Appears somewhat clammy and pale


Chart reviewed


Discussed with RN


She remains critically ill











 


 


BRIEF OPERATIVE NOTE


Pre-Op Diagnosis


Pancreatitis with pseudocysts, suspected infection


Post-Op Diagnosis


same


Procedure Performed


CT abdominal Drains x 3


Surgeon


Hardy


Anesthesia Type:  Conscious Sedation


Findings


3 abdominal drains, 14F, with turbid pancreatic fluid and necrotic debris in 

each.


Complications


No immediate








5/9: Patient today somewhat restless and having bilious secretions from ET tube,

imaging studies ordered, discussed with consultant. Pretty poor prognosis, 

hopefully is not a fistula, poor surgical candidate. 





5/10: Imaging with no acute events, she seems more stable today compared to 

yesterday. Encouraged as much activity as possible patient at high risk for 

severe depression.





Vitals


Vitals





Vital Signs








  Date Time  Temp Pulse Resp B/P (MAP) Pulse Ox O2 Delivery O2 Flow Rate FiO2


 


6/29/20 08:33     100 Tracheal Collar 8.0 


 


6/29/20 08:00 98.0 119 24 113/79 (90)    





 98.0       











Physical Exam


Physical Exam


GENERAL: Patient alert awake comfortable


HEENT: Anicteric, no thrush, NG tube in place


NECK:  Tracheostomy 


LUNGS: Diminished aeration bases, no accessory muscle use - CT on left 


HEART:  S1, S2,regular 


ABDOMEN: Mild distention, bowel sounds present, soft, grimaces to palpation, 

drains x 3


: [place] ( 6/7)


EXTREMITIES: + edema BLE


SKIN: no signs of gen rash 


LUE-PICC  without signs of complications


General:  Alert, Cooperative, No acute distress


Heart:  Regular rate (SR/ST), Other (distant heart sounds)


Lungs:  Other (diminshed in bases)


Abdomen:  Soft, No tenderness, Other (drains with pancreatic necrosis)


Extremities:  No cyanosis, Other (3+ bilateral LE pitting edema)


Skin:  No rashes, No significant lesion





Labs


LABS





Laboratory Tests








Test


 6/28/20


11:52 6/29/20


00:09 6/29/20


05:53 6/29/20


06:00


 


Glucose (Fingerstick)


 128 mg/dL


(70-99) 123 mg/dL


(70-99) 125 mg/dL


(70-99) 





 


Sodium Level


 


 


 


 137 mmol/L


(136-145)


 


Potassium Level


 


 


 


 4.4 mmol/L


(3.5-5.1)


 


Chloride Level


 


 


 


 101 mmol/L


()


 


Carbon Dioxide Level


 


 


 


 35 mmol/L


(21-32)


 


Anion Gap    1 (6-14) 


 


Blood Urea Nitrogen


 


 


 


 16 mg/dL


(7-20)


 


Creatinine


 


 


 


 0.7 mg/dL


(0.6-1.0)


 


Estimated GFR


(Cockcroft-Gault) 


 


 


 88.9 





 


Glucose Level


 


 


 


 129 mg/dL


(70-99)


 


Calcium Level


 


 


 


 10.7 mg/dL


(8.5-10.1)











Assessment and Plan


Assessmemt and Plan


Problems


Medical Problems:


(1) Acute pancreatitis


Status: Acute  





(2) Cholelithiasis


Status: Acute  











Comment


Review of Relevant


I have reviewed the following items josy (where applicable) has been applied.


Labs





Laboratory Tests








Test


 6/27/20


12:15 6/27/20


17:30 6/28/20


00:30 6/28/20


06:06


 


O2 Saturation 95 % (92-99)    


 


Arterial Blood pH


 7.41


(7.35-7.45) 


 


 





 


Arterial Blood pCO2 at


Patient Temp 54 mmHg


(35-46) 


 


 





 


Arterial Blood pO2 at Patient


Temp 79 mmHg


() 


 


 





 


Arterial Blood HCO3


 34 mmol/L


(21-28) 


 


 





 


Arterial Blood Base Excess


 8 mmol/L


(-3-3) 


 


 





 


FiO2 33    


 


Glucose (Fingerstick)


 


 135 mg/dL


(70-99) 121 mg/dL


(70-99) 128 mg/dL


(70-99)


 


Test


 6/28/20


08:15 6/28/20


08:45 6/28/20


11:52 6/29/20


00:09


 


White Blood Count


 12.4 x10^3/uL


(4.0-11.0) 


 


 





 


Red Blood Count


 3.06 x10^6/uL


(3.50-5.40) 


 


 





 


Hemoglobin


 8.5 g/dL


(12.0-15.5) 


 


 





 


Hematocrit


 26.0 %


(36.0-47.0) 


 


 





 


Mean Corpuscular Volume 85 fL ()    


 


Mean Corpuscular Hemoglobin 28 pg (25-35)    


 


Mean Corpuscular Hemoglobin


Concent 33 g/dL


(31-37) 


 


 





 


Red Cell Distribution Width


 17.7 %


(11.5-14.5) 


 


 





 


Platelet Count


 394 x10^3/uL


(140-400) 


 


 





 


Neutrophils (%) (Auto) 78 % (31-73)    


 


Lymphocytes (%) (Auto) 12 % (24-48)    


 


Monocytes (%) (Auto) 8 % (0-9)    


 


Eosinophils (%) (Auto) 2 % (0-3)    


 


Basophils (%) (Auto) 0 % (0-3)    


 


Neutrophils # (Auto)


 9.7 x10^3/uL


(1.8-7.7) 


 


 





 


Lymphocytes # (Auto)


 1.4 x10^3/uL


(1.0-4.8) 


 


 





 


Monocytes # (Auto)


 1.0 x10^3/uL


(0.0-1.1) 


 


 





 


Eosinophils # (Auto)


 0.2 x10^3/uL


(0.0-0.7) 


 


 





 


Basophils # (Auto)


 0.0 x10^3/uL


(0.0-0.2) 


 


 





 


Sodium Level


 137 mmol/L


(136-145) 


 


 





 


Potassium Level


 4.5 mmol/L


(3.5-5.1) 


 


 





 


Chloride Level


 101 mmol/L


() 


 


 





 


Carbon Dioxide Level


 35 mmol/L


(21-32) 


 


 





 


Anion Gap 1 (6-14)    


 


Blood Urea Nitrogen


 16 mg/dL


(7-20) 


 


 





 


Creatinine


 0.6 mg/dL


(0.6-1.0) 


 


 





 


Estimated GFR


(Cockcroft-Gault) 106.3 


 


 


 





 


Glucose Level


 129 mg/dL


(70-99) 


 


 





 


Calcium Level


 10.5 mg/dL


(8.5-10.1) 


 


 





 


Magnesium Level


 2.0 mg/dL


(1.8-2.4) 


 


 





 


O2 Saturation  97 % (92-99)   


 


Arterial Blood pH


 


 7.41


(7.35-7.45) 


 





 


Arterial Blood pCO2 at


Patient Temp 


 55 mmHg


(35-46) 


 





 


Arterial Blood pO2 at Patient


Temp 


 108 mmHg


() 


 





 


Arterial Blood HCO3


 


 34 mmol/L


(21-28) 


 





 


Arterial Blood Base Excess


 


 8 mmol/L


(-3-3) 


 





 


FiO2  33   


 


Glucose (Fingerstick)


 


 


 128 mg/dL


(70-99) 123 mg/dL


(70-99)


 


Test


 6/29/20


05:53 6/29/20


06:00 


 





 


Glucose (Fingerstick)


 125 mg/dL


(70-99) 


 


 





 


Sodium Level


 


 137 mmol/L


(136-145) 


 





 


Potassium Level


 


 4.4 mmol/L


(3.5-5.1) 


 





 


Chloride Level


 


 101 mmol/L


() 


 





 


Carbon Dioxide Level


 


 35 mmol/L


(21-32) 


 





 


Anion Gap  1 (6-14)   


 


Blood Urea Nitrogen


 


 16 mg/dL


(7-20) 


 





 


Creatinine


 


 0.7 mg/dL


(0.6-1.0) 


 





 


Estimated GFR


(Cockcroft-Gault) 


 88.9 


 


 





 


Glucose Level


 


 129 mg/dL


(70-99) 


 





 


Calcium Level


 


 10.7 mg/dL


(8.5-10.1) 


 











Laboratory Tests








Test


 6/28/20


11:52 6/29/20


00:09 6/29/20


05:53 6/29/20


06:00


 


Glucose (Fingerstick)


 128 mg/dL


(70-99) 123 mg/dL


(70-99) 125 mg/dL


(70-99) 





 


Sodium Level


 


 


 


 137 mmol/L


(136-145)


 


Potassium Level


 


 


 


 4.4 mmol/L


(3.5-5.1)


 


Chloride Level


 


 


 


 101 mmol/L


()


 


Carbon Dioxide Level


 


 


 


 35 mmol/L


(21-32)


 


Anion Gap    1 (6-14) 


 


Blood Urea Nitrogen


 


 


 


 16 mg/dL


(7-20)


 


Creatinine


 


 


 


 0.7 mg/dL


(0.6-1.0)


 


Estimated GFR


(Cockcroft-Gault) 


 


 


 88.9 





 


Glucose Level


 


 


 


 129 mg/dL


(70-99)


 


Calcium Level


 


 


 


 10.7 mg/dL


(8.5-10.1)








Microbiology


6/18/20 Blood Culture - Final, Complete


          NO GROWTH AFTER 5 DAYS


6/15/20 Gram Stain - Final, Complete


          


6/15/20 Aerobic and Anaerobic Culture - Final, Complete


          


6/13/20 Gram Stain Evaluation - Final, Complete


          


6/13/20 Respiratory Culture - Final, Complete


          


6/13/20 Antimicrobic Susceptibility - Final, Complete


          


6/7/20 Urine Culture - Final, Complete


         


5/30/20 Gram Stain - Final, Complete


          


5/30/20 Aerobic Culture - Final, Complete


Medications





Current Medications


Sodium Chloride 1,000 ml @  1,000 mls/hr Q1H IV  Last administered on 3/16/20at 

03:00;  Start 3/16/20 at 03:00;  Stop 3/16/20 at 03:59;  Status DC


Ondansetron HCl (Zofran) 4 mg 1X  ONCE IVP  Last administered on 3/16/20at 

03:27;  Start 3/16/20 at 03:00;  Stop 3/16/20 at 03:01;  Status DC


Morphine Sulfate (Morphine Sulfate) 4 mg 1X  ONCE IV ;  Start 3/16/20 at 03:00; 

Stop 3/16/20 at 03:01;  Status Cancel


Ketorolac Tromethamine (Toradol 30mg Vial) 30 mg 1X  ONCE IV  Last administered 

on 3/16/20at 02:54;  Start 3/16/20 at 03:00;  Stop 3/16/20 at 03:01;  Status DC


Fentanyl Citrate (Fentanyl 2ml Vial) 25 mcg 1X  ONCE IVP  Last administered on 

3/16/20at 03:23;  Start 3/16/20 at 03:30;  Stop 3/16/20 at 03:31;  Status DC


Fentanyl Citrate (Fentanyl 2ml Vial) 100 mcg STK-MED ONCE .ROUTE ;  Start 

3/16/20 at 03:18;  Stop 3/16/20 at 03:18;  Status DC


Iohexol (Omnipaque 350 Mg/ml) 90 ml 1X  ONCE IV  Last administered on 3/16/20at 

03:25;  Start 3/16/20 at 03:30;  Stop 3/16/20 at 03:31;  Status DC


Info (CONTRAST GIVEN -- Rx MONITORING) 1 each PRN DAILY  PRN MC SEE COMMENTS;  

Start 3/16/20 at 03:30;  Stop 3/18/20 at 03:29;  Status DC


Hydromorphone HCl (Dilaudid) 0.5 mg 1X  ONCE IV  Last administered on 3/16/20at 

03:55;  Start 3/16/20 at 04:30;  Stop 3/16/20 at 04:32;  Status DC


Ondansetron HCl (Zofran) 4 mg PRN Q8HRS  PRN IV NAUSEA/VOMITING 1ST CHOICE;  

Start 3/16/20 at 05:00;  Stop 3/16/20 at 09:27;  Status DC


Morphine Sulfate (Morphine Sulfate) 2 mg PRN Q2HR  PRN IV SEVERE PAIN 7-10 Last 

administered on 3/17/20at 12:26;  Start 3/16/20 at 05:00;  Stop 3/17/20 at 

14:15;  Status DC


Sodium Chloride 1,000 ml @  125 mls/hr Q8H IV  Last administered on 3/16/20at 

20:56;  Start 3/16/20 at 05:00;  Stop 3/17/20 at 04:59;  Status DC


Hydromorphone HCl (Dilaudid) 0.5 mg PRN Q3HRS  PRN IV SEVERE PAIN 7-10 Last 

administered on 3/17/20at 10:06;  Start 3/16/20 at 05:00;  Stop 3/17/20 at 

12:01;  Status DC


Piperacillin Sod/ Tazobactam Sod 4.5 gm/Sodium Chloride 100 ml @  200 mls/hr 1X 

ONCE IV  Last administered on 3/16/20at 05:44;  Start 3/16/20 at 06:00;  Stop 

3/16/20 at 06:29;  Status DC


Ondansetron HCl (Zofran) 4 mg PRN Q4HRS  PRN IV NAUSEA/VOMITING 1ST CHOICE Last 

administered on 6/27/20at 13:37;  Start 3/16/20 at 09:30


Insulin Human Lispro (HumaLOG) 0-9 UNITS Q6HRS SQ  Last administered on 

6/24/20at 18:05;  Start 3/16/20 at 09:30


Dextrose (Dextrose 50%-Water Syringe) 12.5 gm PRN Q15MIN  PRN IV SEE COMMENTS;  

Start 3/16/20 at 09:30


Pantoprazole Sodium (PROTONIX VIAL for IV PUSH) 40 mg DAILYAC IVP  Last 

administered on 6/29/20at 09:03;  Start 3/16/20 at 11:30


Prochlorperazine Edisylate (Compazine) 10 mg PRN Q6HRS  PRN IV NAUSEA/VOMITING, 

2nd CHOICE Last administered on 6/27/20at 10:53;  Start 3/16/20 at 17:45


Atenolol (Tenormin) 100 mg DAILY PO ;  Start 3/17/20 at 09:00;  Stop 3/16/20 at 

20:08;  Status DC


Metoprolol Tartrate (Lopressor Vial) 2.5 mg Q6HRS IVP  Last administered on 

3/17/20at 05:51;  Start 3/16/20 at 20:15;  Stop 3/17/20 at 10:02;  Status DC


Metoprolol Tartrate (Lopressor Vial) 5 mg Q6HRS IVP  Last administered on 

3/26/20at 00:12;  Start 3/17/20 at 10:15;  Stop 3/28/20 at 08:48;  Status DC


Hydromorphone HCl (Dilaudid) 1 mg PRN Q3HRS  PRN IV SEVERE PAIN 7-10 Last 

administered on 3/23/20at 05:13;  Start 3/17/20 at 12:00;  Stop 3/31/20 at 

00:25;  Status DC


Lidocaine HCl (Buffered Lidocaine 1%) 3 ml STK-MED ONCE .ROUTE ;  Start 3/17/20 

at 12:55;  Stop 3/17/20 at 12:56;  Status DC


Albumin Human 500 ml @  125 mls/hr 1X  ONCE IV  Last administered on 3/17/20at 

14:33;  Start 3/17/20 at 14:30;  Stop 3/17/20 at 18:32;  Status DC


Norepinephrine Bitartrate 8 mg/ Dextrose 258 ml @  17.299 mls/ hr CONT  PRN IV 

PER PROTOCOL Last administered on 4/14/20at 12:48;  Start 3/17/20 at 15:30;  

Stop 4/17/20 at 09:19;  Status DC


Sodium Chloride 1,000 ml @  125 mls/hr Q8H IV  Last administered on 3/17/20at 

21:04;  Start 3/17/20 at 16:00;  Stop 3/18/20 at 02:42;  Status DC


Albumin Human 500 ml @  125 mls/hr PRN BID  PRN IV After every 2L NSS & BP < 

90mm Last administered on 6/6/20at 11:40;  Start 3/17/20 at 16:00


Iohexol (Omnipaque 300 Mg/ml) 60 ml 1X  ONCE IV  Last administered on 3/17/20at 

17:20;  Start 3/17/20 at 17:00;  Stop 3/17/20 at 17:01;  Status DC


Info (CONTRAST GIVEN -- Rx MONITORING) 1 each PRN DAILY  PRN MC SEE COMMENTS;  

Start 3/17/20 at 17:00;  Stop 3/19/20 at 16:59;  Status DC


Meropenem 1 gm/ Sodium Chloride 100 ml @  200 mls/hr Q8HRS IV  Last administered

on 3/18/20at 05:45;  Start 3/17/20 at 20:00;  Stop 3/18/20 at 08:48;  Status DC


Furosemide (Lasix) 40 mg 1X  ONCE IVP  Last administered on 3/17/20at 22:12;  

Start 3/17/20 at 22:30;  Stop 3/17/20 at 22:31;  Status DC


Calcium Chloride 1000 mg/Sodium Chloride 110 ml @  220 mls/hr 1X  ONCE IV  Last 

administered on 3/17/20at 22:11;  Start 3/17/20 at 22:30;  Stop 3/17/20 at 

22:59;  Status DC


Albuterol Sulfate (Ventolin Neb Soln) 2.5 mg 1X  ONCE NEB  Last administered on 

3/18/20at 00:56;  Start 3/17/20 at 22:30;  Stop 3/17/20 at 22:31;  Status DC


Insulin Human Regular (HumuLIN R VIAL) 5 unit 1X  ONCE IV  Last administered on 

3/17/20at 22:14;  Start 3/17/20 at 22:30;  Stop 3/17/20 at 22:31;  Status DC


Magnesium Sulfate 50 ml @ 25 mls/hr 1X  ONCE IV  Last administered on 3/18/20at 

02:57;  Start 3/18/20 at 03:00;  Stop 3/18/20 at 04:59;  Status DC


Calcium Gluconate 1000 mg/Sodium Chloride 110 ml @  220 mls/hr 1X  ONCE IV  Last

administered on 3/18/20at 02:46;  Start 3/18/20 at 03:00;  Stop 3/18/20 at 

03:29;  Status DC


Sodium Chloride 1,000 ml @  200 mls/hr Q5H IV  Last administered on 3/18/20at 

02:46;  Start 3/18/20 at 03:00;  Stop 3/18/20 at 10:21;  Status DC


Calcium Gluconate 1000 mg/Sodium Chloride 110 ml @  220 mls/hr 1X  ONCE IV  Last

administered on 3/18/20at 03:21;  Start 3/18/20 at 03:30;  Stop 3/18/20 at 

03:59;  Status DC


Sodium Bicarbonate 50 meq/Sodium Chloride 1,050 ml @  75 mls/hr Q14H IV  Last 

administered on 3/22/20at 21:10;  Start 3/18/20 at 07:30;  Stop 3/23/20 at 

10:28;  Status DC


Calcium Gluconate 2000 mg/Sodium Chloride 120 ml @  220 mls/hr 1X  ONCE IV  Last

administered on 3/18/20at 09:05;  Start 3/18/20 at 07:30;  Stop 3/18/20 at 

08:02;  Status DC


Lidocaine HCl (Xylocaine-Mpf 1% 2ml Vial) 2 ml STK-MED ONCE .ROUTE ;  Start 

3/18/20 at 08:47;  Stop 3/18/20 at 08:47;  Status DC


Meropenem 500 mg/ Sodium Chloride 50 ml @  100 mls/hr Q12HR IV  Last 

administered on 3/23/20at 21:01;  Start 3/18/20 at 18:00;  Stop 3/24/20 at 07:

58;  Status DC


Lidocaine HCl (Buffered Lidocaine 1%) 3 ml STK-MED ONCE .ROUTE ;  Start 3/18/20 

at 09:46;  Stop 3/18/20 at 09:46;  Status DC


Lidocaine HCl (Buffered Lidocaine 1%) 6 ml 1X  ONCE INJ  Last administered on 

3/18/20at 10:26;  Start 3/18/20 at 10:15;  Stop 3/18/20 at 10:16;  Status DC


Info (Tpn Per Pharmacy) 1 each PRN DAILY  PRN MC SEE COMMENTS Last administered 

on 6/28/20at 10:04;  Start 3/18/20 at 12:00


Sodium Chloride 1,000 ml @  1,000 mls/hr Q1H PRN IV hypotension;  Start 3/18/20 

at 12:07;  Stop 3/18/20 at 18:06;  Status DC


Diphenhydramine HCl (Benadryl) 25 mg 1X PRN  PRN IV ITCHING;  Start 3/18/20 at 

12:15;  Stop 3/19/20 at 12:14;  Status DC


Diphenhydramine HCl (Benadryl) 25 mg 1X PRN  PRN IV ITCHING;  Start 3/18/20 at 

12:15;  Stop 3/19/20 at 12:14;  Status DC


Sodium Chloride 1,000 ml @  400 mls/hr Q2H30M PRN IV PATENCY;  Start 3/18/20 at 

12:07;  Stop 3/19/20 at 00:06;  Status DC


Info (PHARMACY MONITORING -- do not chart) 1 each PRN DAILY  PRN MC SEE 

COMMENTS;  Start 3/18/20 at 12:15;  Stop 3/20/20 at 08:13;  Status DC


Sodium Chloride 90 meq/Calcium Gluconate 10 meq/ Multivitamins 10 ml/Chromium/ 

Copper/Manganese/ Seleni/Zn 1 ml/ Total Parenteral Nutrition/Amino 

Acids/Dextrose/ Fat Emulsion Intravenous 55.005 ml  @ 2.292 mls/hr TPN  CONT IV 

;  Start 3/18/20 at 22:00;  Stop 3/18/20 at 12:33;  Status DC


Info (Tpn Per Pharmacy) 1 each PRN DAILY  PRN MC SEE COMMENTS;  Start 3/18/20 at

12:30;  Status UNV


Sodium Chloride 90 meq/Calcium Gluconate 10 meq/ Multivitamins 10 ml/Chromium/ 

Copper/Manganese/ Seleni/Zn 0.5 ml/ Total Parenteral Nutrition/Amino 

Acids/Dextrose/ Fat Emulsion Intravenous 1,512 ml @  63 mls/hr TPN  CONT IV  L

ast administered on 3/18/20at 22:06;  Start 3/18/20 at 22:00;  Stop 3/19/20 at 

21:59;  Status DC


Calcium Carbonate/ Glycine (Tums) 500 mg PRN AFTMEALHC  PRN PO INDIGESTION;  

Start 3/18/20 at 17:45;  Stop 5/13/20 at 10:25;  Status DC


Calcium Gluconate (Calcium Gluconate) 2,000 mg 1X  ONCE IVP  Last administered 

on 3/19/20at 02:19;  Start 3/19/20 at 02:15;  Stop 3/19/20 at 02:16;  Status DC


Calcium Chloride 3000 mg/Sodium Chloride 1,030 ml @  50 mls/hr [id number] IV  Last 

administered on 3/21/20at 02:17;  Start 3/19/20 at 08:00;  Stop 3/21/20 at 

15:23;  Status DC


Lorazepam (Ativan Inj) 1 mg PRN Q4HRS  PRN IVP ANXIETY / AGITATION, 2nd choic 

Last administered on 4/17/20at 03:51;  Start 3/19/20 at 09:00;  Stop 4/17/20 at 

09:19;  Status DC


Sodium Chloride 1,000 ml @  1,000 mls/hr Q1H PRN IV hypotension;  Start 3/19/20 

at 08:56;  Stop 3/19/20 at 14:55;  Status DC


Albumin Human 200 ml @  200 mls/hr 1X PRN  PRN IV Hypotension;  Start 3/19/20 at

09:00;  Stop 3/19/20 at 14:59;  Status DC


Diphenhydramine HCl (Benadryl) 25 mg 1X PRN  PRN IV ITCHING;  Start 3/19/20 at 

09:00;  Stop 3/20/20 at 08:59;  Status DC


Diphenhydramine HCl (Benadryl) 25 mg 1X PRN  PRN IV ITCHING;  Start 3/19/20 at 

09:00;  Stop 3/20/20 at 08:59;  Status DC


Sodium Chloride 1,000 ml @  400 mls/hr Q2H30M PRN IV PATENCY;  Start 3/19/20 at 

08:56;  Stop 3/19/20 at 20:55;  Status DC


Info (PHARMACY MONITORING -- do not chart) 1 each PRN DAILY  PRN MC SEE 

COMMENTS;  Start 3/19/20 at 09:00;  Status UNV


Info (PHARMACY MONITORING -- do not chart) 1 each PRN DAILY  PRN MC SEE 

COMMENTS;  Start 3/19/20 at 09:00;  Stop 3/20/20 at 08:13;  Status DC


Digoxin (Lanoxin) 500 mcg 1X  ONCE IV  Last administered on 3/19/20at 10:04;  

Start 3/19/20 at 10:00;  Stop 3/19/20 at 10:01;  Status DC


Digoxin (Lanoxin) 125 mcg 1X  ONCE IV  Last administered on 3/19/20at 17:10;  

Start 3/19/20 at 18:00;  Stop 3/19/20 at 18:01;  Status DC


Magnesium Sulfate 100 ml @  25 mls/hr 1X  ONCE IV  Last administered on 

3/19/20at 12:48;  Start 3/19/20 at 13:00;  Stop 3/19/20 at 16:59;  Status DC


Sodium Chloride 90 meq/Magnesium Sulfate 10 meq/ Calcium Gluconate 20 meq/ 

Multivitamins 10 ml/Chromium/ Copper/Manganese/ Seleni/Zn 0.5 ml/ Total 

Parenteral Nutrition/Amino Acids/Dextrose/ Fat Emulsion Intravenous 1,512 ml @  

63 mls/hr TPN  CONT IV  Last administered on 3/19/20at 22:25;  Start 3/19/20 at 

22:00;  Stop 3/20/20 at 21:59;  Status DC


Sodium Chloride 1,000 ml @  1,000 mls/hr Q1H PRN IV hypotension;  Start 3/20/20 

at 08:05;  Stop 3/20/20 at 14:04;  Status DC


Albumin Human 200 ml @  200 mls/hr 1X  ONCE IV  Last administered on 3/20/20at 

08:57;  Start 3/20/20 at 08:15;  Stop 3/20/20 at 09:14;  Status DC


Diphenhydramine HCl (Benadryl) 25 mg 1X PRN  PRN IV ITCHING;  Start 3/20/20 at 

08:15;  Stop 3/21/20 at 08:14;  Status DC


Diphenhydramine HCl (Benadryl) 25 mg 1X PRN  PRN IV ITCHING;  Start 3/20/20 at 

08:15;  Stop 3/21/20 at 08:14;  Status DC


Sodium Chloride 1,000 ml @  400 mls/hr Q2H30M PRN IV PATENCY;  Start 3/20/20 at 

08:05;  Stop 3/20/20 at 20:04;  Status DC


Info (PHARMACY MONITORING -- do not chart) 1 each PRN DAILY  PRN MC SEE 

COMMENTS;  Start 3/20/20 at 08:15;  Stop 3/24/20 at 07:57;  Status DC


Sodium Chloride 90 meq/Potassium Chloride 15 meq/ Potassium Phosphate 10 mmol/ 

Magnesium Sulfate 10 meq/Calcium Gluconate 20 meq/ Multivitamins 10 ml/Chromium/

Copper/Manganese/ Seleni/Zn 0.5 ml/ Total Parenteral Nutrition/Amino 

Acids/Dextrose/ Fat Emulsion Intravenous 1,512 ml @  63 mls/hr TPN  CONT IV  

Last administered on 3/20/20at 21:01;  Start 3/20/20 at 22:00;  Stop 3/21/20 at 

21:59;  Status DC


Potassium Chloride/Water 100 ml @  100 mls/hr 1X  ONCE IV  Last administered on 

3/20/20at 14:09;  Start 3/20/20 at 14:00;  Stop 3/20/20 at 14:59;  Status DC


Benzocaine (Hurricaine One) 1 spray 1X  ONCE MM  Last administered on 3/20/20at 

16:38;  Start 3/20/20 at 14:30;  Stop 3/20/20 at 14:31;  Status DC


Lidocaine HCl (Glydo (Lidocaine) Jelly) 1 thomas 1X  ONCE MM  Last administered on 

3/20/20at 16:38;  Start 3/20/20 at 14:30;  Stop 3/20/20 at 14:31;  Status DC


Linezolid/Dextrose 300 ml @  300 mls/hr Q12HR IV  Last administered on 3/26/20at

21:04;  Start 3/20/20 at 20:00;  Stop 3/27/20 at 07:50;  Status DC


Acetaminophen (Tylenol) 650 mg PRN Q6HRS  PRN PO MILD PAIN / TEMP;  Start 

3/21/20 at 03:30;  Stop 3/21/20 at 03:36;  Status DC


Acetaminophen (Tylenol) 650 mg PRN Q6HRS  PRN PEG MILD PAIN / TEMP Last 

administered on 4/16/20at 19:56;  Start 3/21/20 at 03:36;  Stop 5/13/20 at 

10:25;  Status DC


Sodium Chloride 1,000 ml @  1,000 mls/hr Q1H PRN IV hypotension;  Start 3/21/20 

at 07:50;  Stop 3/21/20 at 13:49;  Status DC


Albumin Human 200 ml @  200 mls/hr 1X PRN  PRN IV Hypotension;  Start 3/21/20 at

08:00;  Stop 3/21/20 at 13:59;  Status DC


Sodium Chloride (Normal Saline Flush) 10 ml 1X PRN  PRN IV AP catheter pack;  

Start 3/21/20 at 08:00;  Stop 3/22/20 at 07:59;  Status DC


Sodium Chloride (Normal Saline Flush) 10 ml 1X PRN  PRN IV  catheter pack;  

Start 3/21/20 at 08:00;  Stop 3/22/20 at 07:59;  Status DC


Sodium Chloride 1,000 ml @  400 mls/hr Q2H30M PRN IV PATENCY;  Start 3/21/20 at 

07:50;  Stop 3/21/20 at 19:49;  Status DC


Info (PHARMACY MONITORING -- do not chart) 1 each PRN DAILY  PRN MC SEE 

COMMENTS;  Start 3/21/20 at 08:00;  Status UNV


Info (PHARMACY MONITORING -- do not chart) 1 each PRN DAILY  PRN MC SEE 

COMMENTS;  Start 3/21/20 at 08:00;  Stop 3/23/20 at 08:25;  Status DC


Sodium Chloride 90 meq/Potassium Chloride 15 meq/ Potassium Phosphate 10 mmol/ 

Magnesium Sulfate 10 meq/Calcium Gluconate 20 meq/ Multivitamins 10 ml/Chromium/

Copper/Manganese/ Seleni/Zn 0.5 ml/ Total Parenteral Nutrition/Amino 

Acids/Dextrose/ Fat Emulsion Intravenous 1,512 ml @  63 mls/hr TPN  CONT IV  

Last administered on 3/21/20at 20:57;  Start 3/21/20 at 22:00;  Stop 3/22/20 at 

21:59;  Status DC


Sodium Chloride 90 meq/Potassium Chloride 15 meq/ Potassium Phosphate 15 mmol/ 

Magnesium Sulfate 10 meq/Calcium Gluconate 20 meq/ Multivitamins 10 ml/Chromium/

Copper/Manganese/ Seleni/Zn 0.5 ml/ Total Parenteral Nutrition/Amino Acids/Dext

verenice/ Fat Emulsion Intravenous 1,512 ml @  63 mls/hr TPN  CONT IV ;  Start 

3/22/20 at 22:00;  Stop 3/22/20 at 14:16;  Status DC


Sodium Chloride 90 meq/Potassium Chloride 15 meq/ Potassium Phosphate 15 mmol/ 

Magnesium Sulfate 10 meq/Calcium Gluconate 20 meq/ Multivitamins 10 ml/Chromium/

Copper/Manganese/ Seleni/Zn 0.5 ml/ Total Parenteral Nutrition/Amino 

Acids/Dextrose/ Fat Emulsion Intravenous 1,200 ml @  50 mls/hr TPN  CONT IV ;  

Start 3/22/20 at 22:00;  Stop 3/22/20 at 14:17;  Status DC


Sodium Chloride 90 meq/Potassium Chloride 15 meq/ Potassium Phosphate 10 mmol/ 

Magnesium Sulfate 10 meq/Calcium Gluconate 20 meq/ Multivitamins 10 ml/Chromium/

Copper/Manganese/ Seleni/Zn 0.5 ml/ Total Parenteral Nutrition/Amino 

Acids/Dextrose/ Fat Emulsion Intravenous 1,200 ml @  50 mls/hr TPN  CONT IV  

Last administered on 3/22/20at 23:29;  Start 3/22/20 at 22:00;  Stop 3/23/20 at 

21:59;  Status DC


Sodium Chloride 1,000 ml @  1,000 mls/hr Q1H PRN IV hypotension;  Start 3/23/20 

at 07:28;  Stop 3/23/20 at 13:27;  Status DC


Albumin Human 200 ml @  200 mls/hr 1X  ONCE IV  Last administered on 3/23/20at 

08:51;  Start 3/23/20 at 07:30;  Stop 3/23/20 at 08:29;  Status DC


Diphenhydramine HCl (Benadryl) 25 mg 1X PRN  PRN IV ITCHING;  Start 3/23/20 at 

07:30;  Stop 3/24/20 at 07:29;  Status DC


Diphenhydramine HCl (Benadryl) 25 mg 1X PRN  PRN IV ITCHING;  Start 3/23/20 at 

07:30;  Stop 3/24/20 at 07:29;  Status DC


Sodium Chloride 1,000 ml @  400 mls/hr Q2H30M PRN IV PATENCY;  Start 3/23/20 at 

07:28;  Stop 3/23/20 at 19:27;  Status DC


Info (PHARMACY MONITORING -- do not chart) 1 each PRN DAILY  PRN MC SEE 

COMMENTS;  Start 3/23/20 at 07:30;  Stop 4/3/20 at 13:01;  Status DC


Metronidazole 100 ml @  100 mls/hr Q6HRS IV  Last administered on 4/8/20at 

06:26;  Start 3/23/20 at 08:30;  Stop 4/8/20 at 09:58;  Status DC


Micafungin Sodium 100 mg/Dextrose 100 ml @  100 mls/hr Q24H IV  Last 

administered on 4/30/20at 08:18;  Start 3/23/20 at 09:00;  Stop 4/30/20 at 

20:58;  Status DC


Propofol 0 ml @ As Directed STK-MED ONCE IV ;  Start 3/23/20 at 07:53;  Stop 

3/23/20 at 07:53;  Status DC


Etomidate (Amidate) 20 mg STK-MED ONCE IV ;  Start 3/23/20 at 07:53;  Stop 

3/23/20 at 07:54;  Status DC


Midazolam HCl (Versed) 5 mg STK-MED ONCE .ROUTE ;  Start 3/23/20 at 07:57;  Stop

3/23/20 at 07:57;  Status DC


Fentanyl Citrate 30 ml @ 0 mls/hr CONT  PRN IV SEE PROTOCOL Last administered on

4/17/20at 06:12;  Start 3/23/20 at 08:15;  Stop 4/17/20 at 09:19;  Status DC


Artificial Tears (Artificial Tears) 1 drop PRN Q1HR  PRN OU DRY EYE, 1st choice;

 Start 3/23/20 at 08:15;  Stop 4/29/20 at 05:31;  Status DC


Midazolam HCl 50 mg/Sodium Chloride 50 ml @ 0 mls/hr CONT  PRN IV SEE PROTOCOL 

Last administered on 3/26/20at 22:39;  Start 3/23/20 at 08:15;  Stop 3/28/20 at 

15:59;  Status DC


Etomidate (Amidate) 8 mg 1X  ONCE IV  Last administered on 3/23/20at 08:33;  

Start 3/23/20 at 08:30;  Stop 3/23/20 at 08:31;  Status DC


Succinylcholine Chloride (Anectine) 120 mg 1X  ONCE IV  Last administered on 

3/23/20at 08:34;  Start 3/23/20 at 08:30;  Stop 3/23/20 at 08:31;  Status DC


Midazolam HCl (Versed) 5 mg 1X  ONCE IV ;  Start 3/23/20 at 08:30;  Stop 3/23/20

at 08:31;  Status DC


Potassium Chloride 15 meq/ Bicarbonate Dialysis Soln w/ out KCl 5,007.5 ml  @ 

1,000 mls/ hr Q5H1M IV  Last administered on 3/24/20at 11:11;  Start 3/23/20 at 

12:00;  Stop 3/24/20 at 11:15;  Status DC


Potassium Chloride 15 meq/ Bicarbonate Dialysis Soln w/ out KCl 5,007.5 ml  @ 

1,000 mls/ hr Q5H1M IV  Last administered on 3/24/20at 11:12;  Start 3/23/20 at 

12:00;  Stop 3/24/20 at 11:17;  Status DC


Potassium Chloride 15 meq/ Bicarbonate Dialysis Soln w/ out KCl 5,007.5 ml  @ 

1,000 mls/ hr Q5H1M IV  Last administered on 3/24/20at 11:11;  Start 3/23/20 at 

12:00;  Stop 3/24/20 at 11:19;  Status DC


Sodium Chloride 90 meq/Potassium Chloride 15 meq/ Potassium Phosphate 10 mmol/ 

Magnesium Sulfate 10 meq/Calcium Gluconate 20 meq/ Multivitamins 10 ml/Chromium/

Copper/Manganese/ Seleni/Zn 0.5 ml/ Total Parenteral Nutrition/Amino 

Acids/Dextrose/ Fat Emulsion Intravenous 1,400 ml @  58.333 mls/ hr TPN  CONT IV

 Last administered on 3/23/20at 21:42;  Start 3/23/20 at 22:00;  Stop 3/24/20 at

21:59;  Status DC


Heparin Sodium (Porcine) (Heparin Sodium) 5,000 unit Q8HRS SQ  Last administered

on 3/28/20at 05:55;  Start 3/23/20 at 15:00;  Stop 3/28/20 at 13:28;  Status DC


Meropenem 500 mg/ Sodium Chloride 50 ml @  100 mls/hr Q6HRS IV  Last 

administered on 3/25/20at 06:00;  Start 3/24/20 at 09:00;  Stop 3/25/20 at 

07:29;  Status DC


Potassium Phosphate 20 mmol/ Sodium Chloride 106.6667 ml @  51.667 m... 1X  ONCE

IV  Last administered on 3/24/20at 11:22;  Start 3/24/20 at 10:15;  Stop 3/24/20

at 12:18;  Status DC


Acetaminophen (Tylenol Supp) 650 mg PRN Q6HRS  PRN TX MILD PAIN / TEMP > 100.3'F

Last administered on 6/28/20at 20:49;  Start 3/24/20 at 10:30


Potassium Chloride/Water 100 ml @  100 mls/hr Q1H IV  Last administered on 

3/24/20at 12:12;  Start 3/24/20 at 11:00;  Stop 3/24/20 at 12:59;  Status DC


Potassium Chloride 20 meq/ Bicarbonate Dialysis Soln w/ out KCl 5,010 ml @  

1,000 mls/hr Q5H1M IV  Last administered on 3/25/20at 08:48;  Start 3/24/20 at 

12:00;  Stop 3/25/20 at 13:03;  Status DC


Potassium Chloride 20 meq/ Bicarbonate Dialysis Soln w/ out KCl 5,010 ml @  

1,000 mls/hr Q5H1M IV  Last administered on 3/29/20at 14:52;  Start 3/24/20 at 

11:30;  Stop 3/29/20 at 19:59;  Status DC


Potassium Chloride 20 meq/ Bicarbonate Dialysis Soln w/ out KCl 5,010 ml @  

1,000 mls/hr Q5H1M IV  Last administered on 3/29/20at 14:53;  Start 3/24/20 at 

11:30;  Stop 3/29/20 at 19:59;  Status DC


Sodium Chloride 90 meq/Potassium Chloride 15 meq/ Potassium Phosphate 15 mmol/ 

Magnesium Sulfate 10 meq/Calcium Gluconate 15 meq/ Multivitamins 10 ml/Chromium/

Copper/Manganese/ Seleni/Zn 0.5 ml/ Total Parenteral Nutrition/Amino 

Acids/Dextrose/ Fat Emulsion Intravenous 1,400 ml @  58.333 mls/ hr TPN  CONT IV

 Last administered on 3/24/20at 22:17;  Start 3/24/20 at 22:00;  Stop 3/25/20 at

21:59;  Status DC


Cefepime HCl (Maxipime) 2 gm Q12HR IVP  Last administered on 4/7/20at 20:56;  

Start 3/25/20 at 09:00;  Stop 4/8/20 at 09:58;  Status DC


Daptomycin 500 mg/ Sodium Chloride 50 ml @  100 mls/hr Q48H IV  Last 

administered on 4/10/20at 09:57;  Start 3/25/20 at 08:30;  Stop 4/10/20 at 

10:07;  Status DC


Lidocaine HCl (Buffered Lidocaine 1%) 3 ml 1X  ONCE INJ  Last administered on 

3/25/20at 10:27;  Start 3/25/20 at 10:30;  Stop 3/25/20 at 10:31;  Status DC


Potassium Phosphate 20 mmol/ Sodium Chloride 106.6667 ml @  51.667 m... 1X  ONCE

IV  Last administered on 3/25/20at 12:51;  Start 3/25/20 at 13:00;  Stop 3/25/20

at 15:03;  Status DC


Sodium Chloride 90 meq/Potassium Chloride 15 meq/ Potassium Phosphate 18 mmol/ 

Magnesium Sulfate 8 meq/Calcium Gluconate 15 meq/ Multivitamins 10 ml/Chromium/ 

Copper/Manganese/ Seleni/Zn 0.5 ml/ Total Parenteral Nutrition/Amino 

Acids/Dextrose/ Fat Emulsion Intravenous 1,400 ml @  58.333 mls/ hr TPN  CONT IV

 Last administered on 3/25/20at 22:16;  Start 3/25/20 at 22:00;  Stop 3/26/20 at

21:59;  Status DC


Potassium Chloride 20 meq/ Bicarbonate Dialysis Soln w/ out KCl 5,010 ml @  

1,000 mls/hr Q5H1M IV  Last administered on 3/29/20at 14:54;  Start 3/25/20 at 

16:00;  Stop 3/29/20 at 19:59;  Status DC


Multi-Ingred Cream/Lotion/Oil/ Oint (Artificial Tears Eye Ointment) 1 thomas PRN Q

1HR  PRN OU DRY EYE, 2nd choice Last administered on 4/13/20at 08:19;  Start 

3/25/20 at 17:30;  Stop 6/3/20 at 14:39;  Status DC


Sodium Chloride 90 meq/Potassium Chloride 15 meq/ Potassium Phosphate 18 mmol/ 

Magnesium Sulfate 8 meq/Calcium Gluconate 15 meq/ Multivitamins 10 ml/Chromium/ 

Copper/Manganese/ Seleni/Zn 0.5 ml/ Total Parenteral Nutrition/Amino 

Acids/Dextrose/ Fat Emulsion Intravenous 1,400 ml @  58.333 mls/ hr TPN  CONT IV

 Last administered on 3/26/20at 22:00;  Start 3/26/20 at 22:00;  Stop 3/27/20 at

21:59;  Status DC


Albumin Human 500 ml @  125 mls/hr 1X  ONCE IV ;  Start 3/26/20 at 14:15;  Stop 

3/26/20 at 18:14;  Status DC


Sodium Chloride 90 meq/Potassium Chloride 15 meq/ Potassium Phosphate 18 mmol/ 

Magnesium Sulfate 8 meq/Calcium Gluconate 15 meq/ Multivitamins 10 ml/Chromium/ 

Copper/Manganese/ Seleni/Zn 0.5 ml/ Insulin Human Regular 10 unit/ Total 

Parenteral Nutrition/Amino Acids/Dextrose/ Fat Emulsion Intravenous 1,400 ml @  

58.333 mls/ hr TPN  CONT IV  Last administered on 3/27/20at 21:43;  Start 

3/27/20 at 22:00;  Stop 3/28/20 at 21:59;  Status DC


Lidocaine HCl (Buffered Lidocaine 1%) 3 ml STK-MED ONCE .ROUTE ;  Start 3/25/20 

at 10:00;  Stop 3/27/20 at 13:57;  Status DC


Midazolam HCl 100 mg/Sodium Chloride 100 ml @ 7 mls/hr CONT  PRN IV SEE PROTOCOL

Last administered on 4/8/20at 15:35;  Start 3/28/20 at 16:00;  Stop 6/3/20 at 

14:38;  Status DC


Sodium Chloride 90 meq/Potassium Chloride 15 meq/ Potassium Phosphate 18 mmol/ 

Magnesium Sulfate 8 meq/Calcium Gluconate 15 meq/ Multivitamins 10 ml/Chromium/ 

Copper/Manganese/ Seleni/Zn 0.5 ml/ Insulin Human Regular 15 unit/ Total 

Parenteral Nutrition/Amino Acids/Dextrose/ Fat Emulsion Intravenous 1,400 ml @  

58.333 mls/ hr TPN  CONT IV  Last administered on 3/28/20at 20:34;  Start 

3/28/20 at 22:00;  Stop 3/29/20 at 21:59;  Status DC


Info ([facility] Electrolyte Protocol) 1 ea CONT PRN  PRN MC PER PROTOCOL;  Start 

3/29/20 at 13:15


Sodium Chloride 90 meq/Potassium Chloride 15 meq/ Potassium Phosphate 18 mmol/ 

Magnesium Sulfate 8 meq/Calcium Gluconate 15 meq/ Multivitamins 10 ml/Chromium/ 

Copper/Manganese/ Seleni/Zn 0.5 ml/ Insulin Human Regular 15 unit/ Total 

Parenteral Nutrition/Amino Acids/Dextrose/ Fat Emulsion Intravenous 1,400 ml @  

58.333 mls/ hr TPN  CONT IV  Last administered on 3/29/20at 22:05;  Start 

3/29/20 at 22:00;  Stop 3/30/20 at 21:59;  Status DC


Potassium Chloride 15 meq/ Bicarbonate Dialysis Soln w/ out KCl 5,007.5 ml  @ 

1,000 mls/ hr Q5H1M IV  Last administered on 4/1/20at 18:14;  Start 3/29/20 at 

20:00;  Stop 4/2/20 at 13:08;  Status DC


Potassium Chloride 15 meq/ Bicarbonate Dialysis Soln w/ out KCl 5,007.5 ml  @ 

1,000 mls/ hr Q5H1M IV  Last administered on 4/1/20at 18:14;  Start 3/29/20 at 

20:00;  Stop 4/2/20 at 13:08;  Status DC


Potassium Chloride 15 meq/ Bicarbonate Dialysis Soln w/ out KCl 5,007.5 ml  @ 

1,000 mls/ hr Q5H1M IV  Last administered on 4/1/20at 18:14;  Start 3/29/20 at 

20:00;  Stop 4/2/20 at 13:08;  Status DC


Iohexol (Omnipaque 240 Mg/ml) 30 ml 1X  ONCE PO  Last administered on 3/30/20at 

11:30;  Start 3/30/20 at 11:30;  Stop 3/30/20 at 11:33;  Status DC


Info (CONTRAST GIVEN -- Rx MONITORING) 1 each PRN DAILY  PRN MC SEE COMMENTS;  

Start 3/30/20 at 11:45;  Stop 4/1/20 at 11:44;  Status DC


Sodium Chloride 90 meq/Potassium Chloride 15 meq/ Potassium Phosphate 18 mmol/ 

Magnesium Sulfate 8 meq/Calcium Gluconate 15 meq/ Multivitamins 10 ml/Chromium/ 

Copper/Manganese/ Seleni/Zn 0.5 ml/ Insulin Human Regular 15 unit/ Total 

Parenteral Nutrition/Amino Acids/Dextrose/ Fat Emulsion Intravenous 1,400 ml @  

58.333 mls/ hr TPN  CONT IV  Last administered on 3/30/20at 21:47;  Start 

3/30/20 at 22:00;  Stop 3/31/20 at 21:59;  Status DC


Sodium Chloride 90 meq/Potassium Chloride 15 meq/ Potassium Phosphate 18 mmol/ 

Magnesium Sulfate 8 meq/Calcium Gluconate 15 meq/ Multivitamins 10 ml/Chromium/ 

Copper/Manganese/ Seleni/Zn 0.5 ml/ Insulin Human Regular 20 unit/ Total 

Parenteral Nutrition/Amino Acids/Dextrose/ Fat Emulsion Intravenous 1,400 ml @  

58.333 mls/ hr TPN  CONT IV  Last administered on 3/31/20at 21:36;  Start 

3/31/20 at 22:00;  Stop 4/1/20 at 21:59;  Status DC


Alteplase, Recombinant (Cathflo For Central Catheter Clearance) 1 mg 1X  ONCE 

INT CAT  Last administered on 3/31/20at 20:03;  Start 3/31/20 at 19:30;  Stop 

3/31/20 at 19:46;  Status DC


Alteplase, Recombinant (Cathflo For Central Catheter Clearance) 1 mg 1X  ONCE 

INT CAT  Last administered on 3/31/20at 22:05;  Start 3/31/20 at 22:00;  Stop 

3/31/20 at 22:01;  Status DC


Sodium Chloride 90 meq/Potassium Chloride 15 meq/ Potassium Phosphate 18 mmol/ 

Magnesium Sulfate 8 meq/Calcium Gluconate 15 meq/ Multivitamins 10 ml/Chromium/ 

Copper/Manganese/ Seleni/Zn 0.5 ml/ Insulin Human Regular 20 unit/ Total 

Parenteral Nutrition/Amino Acids/Dextrose/ Fat Emulsion Intravenous 1,400 ml @  

58.333 mls/ hr TPN  CONT IV  Last administered on 4/1/20at 21:30;  Start 4/1/20 

at 22:00;  Stop 4/2/20 at 21:59;  Status DC


Dexmedetomidine HCl 400 mcg/ Sodium Chloride 100 ml @ 0 mls/hr CONT  PRN IV 

ANXIETY / AGITATION Last administered on 5/30/20at 12:57;  Start 4/2/20 at 

08:15;  Stop 5/30/20 at 18:31;  Status DC


Sodium Chloride 500 ml @  500 mls/hr 1X PRN  PRN IV ELEVATED BP, SEE COMMENTS;  

Start 4/2/20 at 08:15


Atropine Sulfate (ATROPINE 0.5mg SYRINGE) 0.5 mg PRN Q5MIN  PRN IV SEE COMMENTS;

 Start 4/2/20 at 08:15


Furosemide (Lasix) 20 mg 1X  ONCE IVP  Last administered on 4/2/20at 08:19;  

Start 4/2/20 at 08:15;  Stop 4/2/20 at 08:16;  Status DC


Lidocaine HCl (Buffered Lidocaine 1%) 3 ml STK-MED ONCE .ROUTE ;  Start 4/2/20 

at 08:39;  Stop 4/2/20 at 08:39;  Status DC


Lidocaine HCl (Buffered Lidocaine 1%) 6 ml 1X  ONCE INJ  Last administered on 

4/2/20at 09:05;  Start 4/2/20 at 09:00;  Stop 4/2/20 at 09:06;  Status DC


Sodium Chloride 90 meq/Potassium Chloride 15 meq/ Potassium Phosphate 18 mmol/ 

Magnesium Sulfate 8 meq/Calcium Gluconate 15 meq/ Multivitamins 10 ml/Chromium/ 

Copper/Manganese/ Seleni/Zn 0.5 ml/ Insulin Human Regular 20 unit/ Total 

Parenteral Nutrition/Amino Acids/Dextrose/ Fat Emulsion Intravenous 1,400 ml @  

58.333 mls/ hr TPN  CONT IV  Last administered on 4/2/20at 22:45;  Start 4/2/20 

at 22:00;  Stop 4/3/20 at 21:59;  Status DC


Sodium Chloride 1,000 ml @  1,000 mls/hr Q1H PRN IV hypotension;  Start 4/3/20 

at 07:30;  Stop 4/3/20 at 13:29;  Status DC


Albumin Human 200 ml @  200 mls/hr 1X PRN  PRN IV Hypotension Last administered 

on 4/3/20at 09:36;  Start 4/3/20 at 07:30;  Stop 4/3/20 at 13:29;  Status DC


Sodium Chloride (Normal Saline Flush) 10 ml 1X PRN  PRN IV AP catheter pack;  

Start 4/3/20 at 07:30;  Stop 4/3/20 at 21:29;  Status DC


Sodium Chloride (Normal Saline Flush) 10 ml 1X PRN  PRN IV  catheter pack;  S

tart 4/3/20 at 07:30;  Stop 4/4/20 at 07:29;  Status DC


Sodium Chloride 1,000 ml @  400 mls/hr Q2H30M PRN IV PATENCY;  Start 4/3/20 at 

07:30;  Stop 4/3/20 at 19:29;  Status DC


Info (PHARMACY MONITORING -- do not chart) 1 each PRN DAILY  PRN MC SEE COMMENTS

;  Start 4/3/20 at 07:30;  Stop 4/3/20 at 13:02;  Status DC


Info (PHARMACY MONITORING -- do not chart) 1 each PRN DAILY  PRN MC SEE 

COMMENTS;  Start 4/3/20 at 07:30;  Stop 4/5/20 at 12:45;  Status DC


Sodium Chloride 90 meq/Potassium Chloride 15 meq/ Potassium Phosphate 10 mmol/ 

Magnesium Sulfate 8 meq/Calcium Gluconate 15 meq/ Multivitamins 10 ml/Chromium/ 

Copper/Manganese/ Seleni/Zn 0.5 ml/ Insulin Human Regular 25 unit/ Total 

Parenteral Nutrition/Amino Acids/Dextrose/ Fat Emulsion Intravenous 1,400 ml @  

58.333 mls/ hr TPN  CONT IV  Last administered on 4/3/20at 22:19;  Start 4/3/20 

at 22:00;  Stop 4/4/20 at 21:59;  Status DC


Heparin Sodium (Porcine) (Heparin Sodium) 5,000 unit Q12HR SQ  Last administered

on 4/26/20at 08:59;  Start 4/3/20 at 21:00;  Stop 4/26/20 at 10:05;  Status DC


Ondansetron HCl (Zofran) 4 mg PRN Q6HRS  PRN IV NAUSEA/VOMITING;  Start 4/6/20 

at 07:00;  Stop 4/7/20 at 06:59;  Status DC


Fentanyl Citrate (Fentanyl 2ml Vial) 25 mcg PRN Q5MIN  PRN IV MILD PAIN 1-3;  

Start 4/6/20 at 07:00;  Stop 4/7/20 at 06:59;  Status DC


Fentanyl Citrate (Fentanyl 2ml Vial) 50 mcg PRN Q5MIN  PRN IV MODERATE TO SEVERE

PAIN;  Start 4/6/20 at 07:00;  Stop 4/7/20 at 06:59;  Status DC


Ringer's Solution 1,000 ml @  30 mls/hr Q24H IV ;  Start 4/6/20 at 07:00;  Stop 

4/6/20 at 18:59;  Status DC


Lidocaine HCl (Xylocaine-Mpf 1% 2ml Vial) 2 ml PRN 1X  PRN ID PRIOR TO IV START;

 Start 4/6/20 at 07:00;  Stop 4/7/20 at 06:59;  Status DC


Prochlorperazine Edisylate (Compazine) 5 mg PACU PRN  PRN IV NAUSEA, MRX1;  

Start 4/6/20 at 07:00;  Stop 4/7/20 at 06:59;  Status DC


Sodium Chloride 1,000 ml @  1,000 mls/hr Q1H PRN IV hypotension;  Start 4/4/20 

at 09:10;  Stop 4/4/20 at 15:09;  Status DC


Albumin Human 200 ml @  200 mls/hr 1X PRN  PRN IV Hypotension Last administered 

on 4/4/20at 10:10;  Start 4/4/20 at 09:15;  Stop 4/4/20 at 15:14;  Status DC


Sodium Chloride 1,000 ml @  400 mls/hr Q2H30M PRN IV PATENCY;  Start 4/4/20 at 

09:10;  Stop 4/4/20 at 21:09;  Status DC


Info (PHARMACY MONITORING -- do not chart) 1 each PRN DAILY  PRN MC SEE 

COMMENTS;  Start 4/4/20 at 09:15;  Stop 4/5/20 at 12:45;  Status DC


Info (PHARMACY MONITORING -- do not chart) 1 each PRN DAILY  PRN MC SEE 

COMMENTS;  Start 4/4/20 at 09:15;  Stop 4/5/20 at 12:45;  Status DC


Sodium Chloride 90 meq/Potassium Chloride 15 meq/ Potassium Phosphate 10 mmol/ 

Magnesium Sulfate 8 meq/Calcium Gluconate 15 meq/ Multivitamins 10 ml/Chromium/ 

Copper/Manganese/ Seleni/Zn 0.5 ml/ Insulin Human Regular 25 unit/ Total 

Parenteral Nutrition/Amino Acids/Dextrose/ Fat Emulsion Intravenous 1,400 ml @  

58.333 mls/ hr TPN  CONT IV  Last administered on 4/4/20at 22:10;  Start 4/4/20 

at 22:00;  Stop 4/5/20 at 21:59;  Status DC


Magnesium Sulfate 50 ml @ 25 mls/hr PRN DAILY  PRN IV for Mag < 1.7 on am labs 

Last administered on 6/18/20at 10:57;  Start 4/5/20 at 09:15


Sodium Chloride 90 meq/Potassium Chloride 15 meq/ Potassium Phosphate 10 mmol/ 

Magnesium Sulfate 8 meq/Calcium Gluconate 15 meq/ Multivitamins 10 ml/Chromium/ 

Copper/Manganese/ Seleni/Zn 0.5 ml/ Insulin Human Regular 25 unit/ Total Paren

teral Nutrition/Amino Acids/Dextrose/ Fat Emulsion Intravenous 1,400 ml @  

58.333 mls/ hr TPN  CONT IV  Last administered on 4/5/20at 21:20;  Start 4/5/20 

at 22:00;  Stop 4/6/20 at 21:59;  Status DC


Sodium Chloride 1,000 ml @  1,000 mls/hr Q1H PRN IV hypotension;  Start 4/5/20 

at 12:23;  Stop 4/5/20 at 18:22;  Status DC


Albumin Human 200 ml @  200 mls/hr 1X  ONCE IV  Last administered on 4/5/20at 

13:34;  Start 4/5/20 at 12:30;  Stop 4/5/20 at 13:29;  Status DC


Diphenhydramine HCl (Benadryl) 25 mg 1X PRN  PRN IV ITCHING;  Start 4/5/20 at 

12:30;  Stop 4/6/20 at 12:29;  Status DC


Diphenhydramine HCl (Benadryl) 25 mg 1X PRN  PRN IV ITCHING;  Start 4/5/20 at 

12:30;  Stop 4/6/20 at 12:29;  Status DC


Info (PHARMACY MONITORING -- do not chart) 1 each PRN DAILY  PRN MC SEE 

COMMENTS;  Start 4/5/20 at 12:30;  Status Cancel


Bupivacaine HCl/ Epinephrine Bitart (Sensorcain-Epi 0.5%-1:239621 Mpf) 30 ml 

STK-MED ONCE .ROUTE  Last administered on 4/6/20at 11:44;  Start 4/6/20 at 

11:00;  Stop 4/6/20 at 11:01;  Status DC


Cellulose (Surgicel Fibrillar 1x2) 1 each STK-MED ONCE .ROUTE ;  Start 4/6/20 at

11:00;  Stop 4/6/20 at 11:01;  Status DC


Sodium Chloride 90 meq/Potassium Chloride 15 meq/ Potassium Phosphate 10 mmol/ 

Magnesium Sulfate 12 meq/Calcium Gluconate 15 meq/ Multivitamins 10 ml/Chromium/

Copper/Manganese/ Seleni/Zn 0.5 ml/ Insulin Human Regular 25 unit/ Total 

Parenteral Nutrition/Amino Acids/Dextrose/ Fat Emulsion Intravenous 1,400 ml @  

58.333 mls/ hr TPN  CONT IV  Last administered on 4/6/20at 22:24;  Start 4/6/20 

at 22:00;  Stop 4/7/20 at 21:59;  Status DC


Propofol 20 ml @ As Directed STK-MED ONCE IV ;  Start 4/6/20 at 11:07;  Stop 

4/6/20 at 11:07;  Status DC


Cellulose (Surgicel Hemostat 4x8) 1 each STK-MED ONCE .ROUTE  Last administered 

on 4/6/20at 11:44;  Start 4/6/20 at 11:55;  Stop 4/6/20 at 11:56;  Status DC


Sevoflurane (Ultane) 60 ml STK-MED ONCE IH ;  Start 4/6/20 at 12:46;  Stop 

4/6/20 at 12:46;  Status DC


Sodium Chloride 1,000 ml @  1,000 mls/hr Q1H PRN IV hypotension;  Start 4/6/20 

at 13:51;  Stop 4/6/20 at 19:50;  Status DC


Albumin Human 200 ml @  200 mls/hr 1X PRN  PRN IV Hypotension Last administered 

on 4/6/20at 14:51;  Start 4/6/20 at 14:00;  Stop 4/6/20 at 19:59;  Status DC


Diphenhydramine HCl (Benadryl) 25 mg 1X PRN  PRN IV ITCHING;  Start 4/6/20 at 

14:00;  Stop 4/7/20 at 13:59;  Status DC


Diphenhydramine HCl (Benadryl) 25 mg 1X PRN  PRN IV ITCHING;  Start 4/6/20 at 

14:00;  Stop 4/7/20 at 13:59;  Status DC


Sodium Chloride 1,000 ml @  400 mls/hr Q2H30M PRN IV PATENCY;  Start 4/6/20 at 

13:51;  Stop 4/7/20 at 01:50;  Status DC


Info (PHARMACY MONITORING -- do not chart) 1 each PRN DAILY  PRN MC SEE 

COMMENTS;  Start 4/6/20 at 14:00;  Stop 4/9/20 at 08:16;  Status DC


Heparin Sodium (Porcine) (Hep Lock Adult) 500 unit STK-MED ONCE IVP ;  Start 

4/7/20 at 09:29;  Stop 4/7/20 at 09:30;  Status DC


Sodium Chloride 1,000 ml @  1,000 mls/hr Q1H PRN IV hypotension;  Start 4/7/20 

at 10:43;  Stop 4/7/20 at 16:42;  Status DC


Sodium Chloride 1,000 ml @  400 mls/hr Q2H30M PRN IV PATENCY;  Start 4/7/20 at 

10:43;  Stop 4/7/20 at 22:42;  Status DC


Info (PHARMACY MONITORING -- do not chart) 1 each PRN DAILY  PRN MC SEE 

COMMENTS;  Start 4/7/20 at 10:45;  Status UNV


Info (PHARMACY MONITORING -- do not chart) 1 each PRN DAILY  PRN MC SEE 

COMMENTS;  Start 4/7/20 at 10:45;  Status UNV


Sodium Chloride 90 meq/Potassium Chloride 15 meq/ Magnesium Sulfate 12 

meq/Calcium Gluconate 15 meq/ Multivitamins 10 ml/Chromium/ Copper/Manganese/ 

Seleni/Zn 0.5 ml/ Insulin Human Regular 25 unit/ Total Parenteral 

Nutrition/Amino Acids/Dextrose/ Fat Emulsion Intravenous 1,400 ml @  58.333 mls/

hr TPN  CONT IV  Last administered on 4/7/20at 22:13;  Start 4/7/20 at 22:00;  

Stop 4/8/20 at 21:59;  Status DC


Sodium Chloride 1,000 ml @  1,000 mls/hr Q1H PRN IV hypotension;  Start 4/8/20 

at 07:50;  Stop 4/8/20 at 13:49;  Status DC


Albumin Human 200 ml @  200 mls/hr 1X  ONCE IV ;  Start 4/8/20 at 08:00;  Stop 

4/8/20 at 08:53;  Status DC


Diphenhydramine HCl (Benadryl) 25 mg 1X PRN  PRN IV ITCHING;  Start 4/8/20 at 

08:00;  Stop 4/9/20 at 07:59;  Status DC


Diphenhydramine HCl (Benadryl) 25 mg 1X PRN  PRN IV ITCHING;  Start 4/8/20 at 

08:00;  Stop 4/9/20 at 07:59;  Status DC


Info (PHARMACY MONITORING -- do not chart) 1 each PRN DAILY  PRN MC SEE 

COMMENTS;  Start 4/8/20 at 08:00;  Stop 4/9/20 at 08:16;  Status DC


Albumin Human 50 ml @ 50 mls/hr 1X  ONCE IV ;  Start 4/8/20 at 08:53;  Stop 

4/8/20 at 08:56;  Status DC


Albumin Human 200 ml @  50 mls/hr PRN 1X  PRN IV HYPOTENSION Last administered 

on 4/14/20at 11:54;  Start 4/8/20 at 09:00;  Stop 5/21/20 at 11:14;  Status DC


Meropenem 500 mg/ Sodium Chloride 50 ml @  100 mls/hr Q12H IV  Last administered

on 4/28/20at 10:45;  Start 4/8/20 at 10:00;  Stop 4/28/20 at 12:37;  Status DC


Sodium Chloride 90 meq/Magnesium Sulfate 12 meq/ Calcium Gluconate 15 meq/ 

Multivitamins 10 ml/Chromium/ Copper/Manganese/ Seleni/Zn 0.5 ml/ Insulin Human 

Regular 25 unit/ Total Parenteral Nutrition/Amino Acids/Dextrose/ Fat Emulsion 

Intravenous 1,400 ml @  58.333 mls/ hr TPN  CONT IV  Last administered on 

4/8/20at 21:41;  Start 4/8/20 at 22:00;  Stop 4/9/20 at 21:59;  Status DC


Sodium Chloride 1,000 ml @  1,000 mls/hr Q1H PRN IV hypotension;  Start 4/9/20 

at 07:58;  Stop 4/9/20 at 13:57;  Status DC


Albumin Human 200 ml @  200 mls/hr 1X PRN  PRN IV Hypotension Last administered 

on 4/9/20at 09:30;  Start 4/9/20 at 08:00;  Stop 4/9/20 at 13:59;  Status DC


Sodium Chloride 1,000 ml @  400 mls/hr Q2H30M PRN IV PATENCY;  Start 4/9/20 at 

07:58;  Stop 4/9/20 at 19:57;  Status DC


Info (PHARMACY MONITORING -- do not chart) 1 each PRN DAILY  PRN MC SEE 

COMMENTS;  Start 4/9/20 at 08:00;  Status Cancel


Info (PHARMACY MONITORING -- do not chart) 1 each PRN DAILY  PRN MC SEE 

COMMENTS;  Start 4/9/20 at 08:15;  Status UNV


Sodium Chloride 90 meq/Potassium Phosphate 5 mmol/ Magnesium Sulfate 12 

meq/Calcium Gluconate 15 meq/ Multivitamins 10 ml/Chromium/ Copper/Manganese/ 

Seleni/Zn 0.5 ml/ Insulin Human Regular 30 unit/ Total Parenteral 

Nutrition/Amino Acids/Dextrose/ Fat Emulsion Intravenous 1,400 ml @  58.333 mls/

hr TPN  CONT IV  Last administered on 4/9/20at 22:08;  Start 4/9/20 at 22:00;  

Stop 4/10/20 at 21:59;  Status DC


Linezolid/Dextrose 300 ml @  300 mls/hr Q12HR IV  Last administered on 4/20/20at

20:40;  Start 4/10/20 at 11:00;  Stop 4/21/20 at 08:10;  Status DC


Sodium Chloride 90 meq/Potassium Phosphate 15 mmol/ Magnesium Sulfate 12 

meq/Calcium Gluconate 15 meq/ Multivitamins 10 ml/Chromium/ Copper/Manganese/ 

Seleni/Zn 0.5 ml/ Insulin Human Regular 30 unit/ Total Parenteral 

Nutrition/Amino Acids/Dextrose/ Fat Emulsion Intravenous 1,400 ml @  58.333 mls/

hr TPN  CONT IV  Last administered on 4/10/20at 21:49;  Start 4/10/20 at 22:00; 

Stop 4/11/20 at 21:59;  Status DC


Sodium Chloride 90 meq/Potassium Phosphate 15 mmol/ Magnesium Sulfate 12 

meq/Calcium Gluconate 15 meq/ Multivitamins 10 ml/Chromium/ Copper/Manganese/ 

Seleni/Zn 0.5 ml/ Insulin Human Regular 40 unit/ Total Parenteral 

Nutrition/Amino Acids/Dextrose/ Fat Emulsion Intravenous 1,400 ml @  58.333 mls/

hr TPN  CONT IV  Last administered on 4/11/20at 21:21;  Start 4/11/20 at 22:00; 

Stop 4/12/20 at 21:59;  Status DC


Sodium Chloride 1,000 ml @  1,000 mls/hr Q1H PRN IV hypotension;  Start 4/11/20 

at 13:26;  Stop 4/11/20 at 19:25;  Status DC


Albumin Human 200 ml @  200 mls/hr 1X PRN  PRN IV Hypotension Last administered 

on 4/11/20at 15:00;  Start 4/11/20 at 13:30;  Stop 4/11/20 at 19:29;  Status DC


Sodium Chloride (Normal Saline Flush) 10 ml 1X PRN  PRN IV AP catheter pack;  

Start 4/11/20 at 13:30;  Stop 4/12/20 at 13:29;  Status DC


Sodium Chloride (Normal Saline Flush) 10 ml 1X PRN  PRN IV  catheter pack;  

Start 4/11/20 at 13:30;  Stop 4/12/20 at 13:29;  Status DC


Sodium Chloride 1,000 ml @  400 mls/hr Q2H30M PRN IV PATENCY;  Start 4/11/20 at 

13:26;  Stop 4/12/20 at 01:25;  Status DC


Info (PHARMACY MONITORING -- do not chart) 1 each PRN DAILY  PRN MC SEE 

COMMENTS;  Start 4/11/20 at 13:30;  Stop 4/11/20 at 13:33;  Status DC


Info (PHARMACY MONITORING -- do not chart) 1 each PRN DAILY  PRN MC SEE 

COMMENTS;  Start 4/11/20 at 13:30;  Stop 4/11/20 at 13:34;  Status DC


Sodium Chloride 90 meq/Potassium Phosphate 19 mmol/ Magnesium Sulfate 12 

meq/Calcium Gluconate 15 meq/ Multivitamins 10 ml/Chromium/ Copper/Manganese/ 

Seleni/Zn 0.5 ml/ Insulin Human Regular 40 unit/ Total Parenteral 

Nutrition/Amino Acids/Dextrose/ Fat Emulsion Intravenous 1,400 ml @  58.333 mls/

hr TPN  CONT IV  Last administered on 4/12/20at 21:54;  Start 4/12/20 at 22:00; 

Stop 4/13/20 at 21:59;  Status DC


Sodium Chloride 1,000 ml @  1,000 mls/hr Q1H PRN IV hypotension;  Start 4/13/20 

at 09:35;  Stop 4/13/20 at 15:34;  Status DC


Albumin Human 200 ml @  200 mls/hr 1X PRN  PRN IV Hypotension;  Start 4/13/20 at

09:45;  Stop 4/13/20 at 15:44;  Status DC


Diphenhydramine HCl (Benadryl) 25 mg 1X PRN  PRN IV ITCHING;  Start 4/13/20 at 

09:45;  Stop 4/14/20 at 09:44;  Status DC


Diphenhydramine HCl (Benadryl) 25 mg 1X PRN  PRN IV ITCHING;  Start 4/13/20 at 

09:45;  Stop 4/14/20 at 09:44;  Status DC


Sodium Chloride 1,000 ml @  400 mls/hr Q2H30M PRN IV PATENCY;  Start 4/13/20 at 

09:35;  Stop 4/13/20 at 21:34;  Status DC


Info (PHARMACY MONITORING -- do not chart) 1 each PRN DAILY  PRN MC SEE 

COMMENTS;  Start 4/13/20 at 09:45;  Status Cancel


Sodium Chloride 100 meq/Potassium Phosphate 19 mmol/ Magnesium Sulfate 12 

meq/Calcium Gluconate 15 meq/ Multivitamins 10 ml/Chromium/ Copper/Manganese/ 

Seleni/Zn 0.5 ml/ Insulin Human Regular 40 unit/ Potassium Chloride 20 meq/ 

Total Parenteral Nutrition/Amino Acids/Dextrose/ Fat Emulsion Intravenous 1,400 

ml @  58.333 mls/ hr TPN  CONT IV  Last administered on 4/13/20at 22:02;  Start 

4/13/20 at 22:00;  Stop 4/14/20 at 21:59;  Status DC


Furosemide (Lasix) 40 mg 1X  ONCE IVP  Last administered on 4/13/20at 14:39;  

Start 4/13/20 at 14:30;  Stop 4/13/20 at 14:31;  Status DC


Metronidazole 100 ml @  100 mls/hr Q8HRS IV  Last administered on 4/21/20at 

06:04;  Start 4/14/20 at 10:00;  Stop 4/21/20 at 08:10;  Status DC


Sodium Chloride 1,000 ml @  1,000 mls/hr Q1H PRN IV hypotension;  Start 4/14/20 

at 08:00;  Stop 4/14/20 at 13:59;  Status DC


Albumin Human 200 ml @  200 mls/hr 1X PRN  PRN IV Hypotension;  Start 4/14/20 at

08:00;  Stop 4/14/20 at 13:59;  Status DC


Sodium Chloride 1,000 ml @  400 mls/hr Q2H30M PRN IV PATENCY;  Start 4/14/20 at 

08:00;  Stop 4/14/20 at 19:59;  Status DC


Info (PHARMACY MONITORING -- do not chart) 1 each PRN DAILY  PRN MC SEE 

COMMENTS;  Start 4/14/20 at 11:30;  Status UNV


Info (PHARMACY MONITORING -- do not chart) 1 each PRN DAILY  PRN MC SEE 

COMMENTS;  Start 4/14/20 at 11:30;  Stop 4/16/20 at 12:13;  Status DC


Sodium Chloride 100 meq/Potassium Phosphate 19 mmol/ Magnesium Sulfate 12 

meq/Calcium Gluconate 15 meq/ Multivitamins 10 ml/Chromium/ Copper/Manganese/ 

Seleni/Zn 0.5 ml/ Insulin Human Regular 40 unit/ Potassium Chloride 20 meq/ 

Total Parenteral Nutrition/Amino Acids/Dextrose/ Fat Emulsion Intravenous 1,400 

ml @  58.333 mls/ hr TPN  CONT IV  Last administered on 4/14/20at 21:52;  Start 

4/14/20 at 22:00;  Stop 4/15/20 at 21:59;  Status DC


Sodium Chloride (Normal Saline Flush) 10 ml QSHIFT  PRN IV AFTER MEDS AND BLOOD 

DRAWS;  Start 4/14/20 at 15:00;  Stop 5/12/20 at 11:27;  Status DC


Sodium Chloride (Normal Saline Flush) 10 ml PRN Q5MIN  PRN IV AFTER MEDS AND 

BLOOD DRAWS;  Start 4/14/20 at 15:00


Sodium Chloride (Normal Saline Flush) 20 ml PRN Q5MIN  PRN IV AFTER MEDS AND 

BLOOD DRAWS;  Start 4/14/20 at 15:00


Sodium Chloride 100 meq/Potassium Phosphate 19 mmol/ Magnesium Sulfate 12 

meq/Calcium Gluconate 15 meq/ Multivitamins 10 ml/Chromium/ Copper/Manganese/ 

Seleni/Zn 0.5 ml/ Insulin Human Regular 40 unit/ Potassium Chloride 20 meq/ 

Total Parenteral Nutrition/Amino Acids/Dextrose/ Fat Emulsion Intravenous 1,400 

ml @  58.333 mls/ hr TPN  CONT IV  Last administered on 4/15/20at 21:20;  Start 

4/15/20 at 22:00;  Stop 4/16/20 at 21:59;  Status DC


Lidocaine HCl (Buffered Lidocaine 1%) 3 ml STK-MED ONCE .ROUTE ;  Start 4/15/20 

at 13:16;  Stop 4/15/20 at 13:16;  Status DC


Lidocaine HCl (Buffered Lidocaine 1%) 6 ml 1X  ONCE INJ  Last administered on 

4/15/20at 13:45;  Start 4/15/20 at 13:30;  Stop 4/15/20 at 13:31;  Status DC


Albumin Human 100 ml @  100 mls/hr 1X  ONCE IV  Last administered on 4/15/20at 

15:41;  Start 4/15/20 at 15:00;  Stop 4/15/20 at 15:59;  Status DC


Albumin Human 50 ml @ 50 mls/hr 1X  ONCE IV  Last administered on 4/15/20at 

15:00;  Start 4/15/20 at 15:00;  Stop 4/15/20 at 15:59;  Status DC


Info (PHARMACY MONITORING -- do not chart) 1 each PRN DAILY  PRN MC SEE 

COMMENTS;  Start 4/16/20 at 11:30;  Status Cancel


Info (PHARMACY MONITORING -- do not chart) 1 each PRN DAILY  PRN MC SEE 

COMMENTS;  Start 4/16/20 at 11:30;  Status UNV


Sodium Chloride 100 meq/Potassium Phosphate 10 mmol/ Magnesium Sulfate 12 

meq/Calcium Gluconate 15 meq/ Multivitamins 10 ml/Chromium/ Copper/Manganese/ 

Seleni/Zn 0.5 ml/ Insulin Human Regular 35 unit/ Potassium Chloride 20 meq/ 

Total Parenteral Nutrition/Amino Acids/Dextrose/ Fat Emulsion Intravenous 1,400 

ml @  58.333 mls/ hr TPN  CONT IV  Last administered on 4/16/20at 22:10;  Start 

4/16/20 at 22:00;  Stop 4/17/20 at 21:59;  Status DC


Sodium Chloride 100 meq/Potassium Phosphate 5 mmol/ Magnesium Sulfate 12 

meq/Calcium Gluconate 15 meq/ Multivitamins 10 ml/Chromium/ Copper/Manganese/ 

Seleni/Zn 0.5 ml/ Insulin Human Regular 35 unit/ Potassium Chloride 20 meq/ 

Total Parenteral Nutrition/Amino Acids/Dextrose/ Fat Emulsion Intravenous 1,400 

ml @  58.333 mls/ hr TPN  CONT IV  Last administered on 4/17/20at 22:59;  Start 

4/17/20 at 22:00;  Stop 4/18/20 at 21:59;  Status DC


Sodium Chloride 1,000 ml @  1,000 mls/hr Q1H PRN IV hypotension;  Start 4/18/20 

at 08:27;  Stop 4/18/20 at 14:26;  Status DC


Albumin Human 200 ml @  200 mls/hr 1X PRN  PRN IV Hypotension Last administered 

on 4/18/20at 09:18;  Start 4/18/20 at 08:30;  Stop 4/18/20 at 14:29;  Status DC


Sodium Chloride 1,000 ml @  400 mls/hr Q2H30M PRN IV PATENCY;  Start 4/18/20 at 

08:27;  Stop 4/18/20 at 20:26;  Status DC


Info (PHARMACY MONITORING -- do not chart) 1 each PRN DAILY  PRN MC SEE 

COMMENTS;  Start 4/18/20 at 08:30;  Status Cancel


Info (PHARMACY MONITORING -- do not chart) 1 each PRN DAILY  PRN MC SEE 

COMMENTS;  Start 4/18/20 at 08:30;  Stop 4/26/20 at 13:10;  Status DC


Sodium Chloride 100 meq/Potassium Chloride 40 meq/ Magnesium Sulfate 15 

meq/Calcium Gluconate 15 meq/ Multivitamins 10 ml/Chromium/ Copper/Manganese/ 

Seleni/Zn 0.5 ml/ Insulin Human Regular 35 unit/ Total Parenteral 

Nutrition/Amino Acids/Dextrose/ Fat Emulsion Intravenous 1,400 ml @  58.333 mls/

hr TPN  CONT IV  Last administered on 4/18/20at 22:00;  Start 4/18/20 at 22:00; 

Stop 4/19/20 at 21:59;  Status DC


Potassium Chloride/Water 100 ml @  100 mls/hr 1X  ONCE IV  Last administered on 

4/18/20at 17:28;  Start 4/18/20 at 14:45;  Stop 4/18/20 at 15:44;  Status DC


Sodium Chloride 100 meq/Potassium Chloride 40 meq/ Magnesium Sulfate 15 

meq/Calcium Gluconate 15 meq/ Multivitamins 10 ml/Chromium/ Copper/Manganese/ 

Seleni/Zn 0.5 ml/ Insulin Human Regular 35 unit/ Total Parenteral 

Nutrition/Amino Acids/Dextrose/ Fat Emulsion Intravenous 1,400 ml @  58.333 mls/

hr TPN  CONT IV  Last administered on 4/19/20at 22:46;  Start 4/19/20 at 22:00; 

Stop 4/20/20 at 21:59;  Status DC


Sodium Chloride 100 meq/Potassium Chloride 40 meq/ Magnesium Sulfate 20 

meq/Calcium Gluconate 15 meq/ Multivitamins 10 ml/Chromium/ Copper/Manganese/ 

Seleni/Zn 0.5 ml/ Insulin Human Regular 35 unit/ Total Parenteral 

Nutrition/Amino Acids/Dextrose/ Fat Emulsion Intravenous 1,400 ml @  58.333 mls/

hr TPN  CONT IV  Last administered on 4/20/20at 22:31;  Start 4/20/20 at 22:00; 

Stop 4/21/20 at 21:59;  Status DC


Fentanyl Citrate (Fentanyl 2ml Vial) 50 mcg PRN Q2HR  PRN IVP PAIN Last 

administered on 4/27/20at 13:32;  Start 4/20/20 at 21:00;  Stop 4/28/20 at 

12:53;  Status DC


Fentanyl Citrate (Fentanyl 2ml Vial) 25 mcg PRN Q2HR  PRN IVP PAIN;  Start 

4/20/20 at 21:00;  Stop 4/28/20 at 12:54;  Status DC


Enoxaparin Sodium (Lovenox 100mg Syringe) 100 mg Q12HR SQ ;  Start 4/21/20 at 

21:00;  Status UNV


Amino Acids/ Glycerin/ Electrolytes 1,000 ml @  75 mls/hr [id number] IV ;  Start 

4/20/20 at 21:15;  Status UNV


Sodium Chloride 1,000 ml @  1,000 mls/hr Q1H PRN IV hypotension;  Start 4/21/20 

at 07:56;  Stop 4/21/20 at 13:55;  Status DC


Albumin Human 200 ml @  200 mls/hr 1X PRN  PRN IV Hypotension Last administered 

on 4/21/20at 08:40;  Start 4/21/20 at 08:00;  Stop 4/21/20 at 13:59;  Status DC


Sodium Chloride 1,000 ml @  400 mls/hr Q2H30M PRN IV PATENCY;  Start 4/21/20 at 

07:56;  Stop 4/21/20 at 19:55;  Status DC


Info (PHARMACY MONITORING -- do not chart) 1 each PRN DAILY  PRN MC SEE 

COMMENTS;  Start 4/21/20 at 08:00;  Status UNV


Info (PHARMACY MONITORING -- do not chart) 1 each PRN DAILY  PRN MC SEE 

COMMENTS;  Start 4/21/20 at 08:00;  Status UNV


Daptomycin 430 mg/ Sodium Chloride 50 ml @  100 mls/hr Q24H IV  Last 

administered on 4/21/20at 12:35;  Start 4/21/20 at 09:00;  Stop 4/21/20 at 

12:49;  Status DC


Sodium Chloride 100 meq/Potassium Chloride 40 meq/ Magnesium Sulfate 20 me

q/Calcium Gluconate 15 meq/ Multivitamins 10 ml/Chromium/ Copper/Manganese/ 

Seleni/Zn 0.5 ml/ Insulin Human Regular 35 unit/ Total Parenteral 

Nutrition/Amino Acids/Dextrose/ Fat Emulsion Intravenous 1,400 ml @  58.333 mls/

hr TPN  CONT IV  Last administered on 4/21/20at 21:26;  Start 4/21/20 at 22:00; 

Stop 4/22/20 at 21:59;  Status DC


Daptomycin 430 mg/ Sodium Chloride 50 ml @  100 mls/hr Q48H IV ;  Start 4/23/20 

at 09:00;  Stop 4/22/20 at 11:55;  Status DC


Sodium Chloride 100 meq/Potassium Chloride 40 meq/ Magnesium Sulfate 20 

meq/Calcium Gluconate 15 meq/ Multivitamins 10 ml/Chromium/ Copper/Manganese/ 

Seleni/Zn 0.5 ml/ Insulin Human Regular 35 unit/ Total Parenteral 

Nutrition/Amino Acids/Dextrose/ Fat Emulsion Intravenous 1,400 ml @  58.333 mls/

hr TPN  CONT IV  Last administered on 4/22/20at 22:27;  Start 4/22/20 at 22:00; 

Stop 4/23/20 at 21:59;  Status DC


Daptomycin 430 mg/ Sodium Chloride 50 ml @  100 mls/hr Q24H IV  Last 

administered on 4/24/20at 15:07;  Start 4/22/20 at 13:00;  Stop 4/25/20 at 13:15

;  Status DC


Sodium Chloride 100 meq/Potassium Chloride 40 meq/ Magnesium Sulfate 20 

meq/Calcium Gluconate 10 meq/ Multivitamins 10 ml/Chromium/ Copper/Manganese/ 

Seleni/Zn 0.5 ml/ Insulin Human Regular 35 unit/ Total Parenteral 

Nutrition/Amino Acids/Dextrose/ Fat Emulsion Intravenous 1,400 ml @  58.333 mls/

hr TPN  CONT IV  Last administered on 4/24/20at 00:06;  Start 4/23/20 at 22:00; 

Stop 4/24/20 at 21:59;  Status DC


Alteplase, Recombinant (Cathflo For Central Catheter Clearance) 1 mg 1X  ONCE 

INT CAT  Last administered on 4/24/20at 11:44;  Start 4/24/20 at 10:45;  Stop 

4/24/20 at 10:46;  Status DC


Ondansetron HCl (Zofran) 4 mg PRN Q6HRS  PRN IV NAUSEA/VOMITING;  Start 4/27/20 

at 07:00;  Stop 4/28/20 at 06:59;  Status DC


Fentanyl Citrate (Fentanyl 2ml Vial) 25 mcg PRN Q5MIN  PRN IV MILD PAIN 1-3;  

Start 4/27/20 at 07:00;  Stop 4/28/20 at 06:59;  Status DC


Fentanyl Citrate (Fentanyl 2ml Vial) 50 mcg PRN Q5MIN  PRN IV MODERATE TO SEVERE

PAIN Last administered on 4/27/20at 10:17;  Start 4/27/20 at 07:00;  Stop 

4/28/20 at 06:59;  Status DC


Ringer's Solution 1,000 ml @  30 mls/hr Q24H IV ;  Start 4/27/20 at 07:00;  Stop

4/27/20 at 18:59;  Status DC


Lidocaine HCl (Xylocaine-Mpf 1% 2ml Vial) 2 ml PRN 1X  PRN ID PRIOR TO IV START;

 Start 4/27/20 at 07:00;  Stop 4/28/20 at 06:59;  Status DC


Prochlorperazine Edisylate (Compazine) 5 mg PACU PRN  PRN IV NAUSEA, MRX1;  

Start 4/27/20 at 07:00;  Stop 4/28/20 at 06:59;  Status DC


Sodium Acetate 50 meq/Potassium Acetate 55 meq/ Magnesium Sulfate 20 meq/Calcium

Gluconate 10 meq/ Multivitamins 10 ml/Chromium/ Copper/Manganese/ Seleni/Zn 0.5 

ml/ Insulin Human Regular 35 unit/ Total Parenteral Nutrition/Amino 

Acids/Dextrose/ Fat Emulsion Intravenous 1,400 ml @  58.333 mls/ hr TPN  CONT IV

;  Start 4/24/20 at 22:00;  Stop 4/24/20 at 14:15;  Status DC


Sodium Acetate 50 meq/Potassium Acetate 55 meq/ Magnesium Sulfate 20 meq/Calcium

Gluconate 10 meq/ Multivitamins 10 ml/Chromium/ Copper/Manganese/ Seleni/Zn 0.5 

ml/ Insulin Human Regular 35 unit/ Total Parenteral Nutrition/Amino Acids

/Dextrose/ Fat Emulsion Intravenous 1,800 ml @  75 mls/hr TPN  CONT IV  Last 

administered on 4/24/20at 22:38;  Start 4/24/20 at 22:00;  Stop 4/25/20 at 

21:59;  Status DC


Sodium Chloride 1,000 ml @  1,000 mls/hr Q1H PRN IV hypotension;  Start 4/24/20 

at 15:31;  Stop 4/24/20 at 21:30;  Status DC


Diphenhydramine HCl (Benadryl) 25 mg 1X PRN  PRN IV ITCHING;  Start 4/24/20 at 

15:45;  Stop 4/25/20 at 15:44;  Status DC


Diphenhydramine HCl (Benadryl) 25 mg 1X PRN  PRN IV ITCHING;  Start 4/24/20 at 

15:45;  Stop 4/25/20 at 15:44;  Status DC


Sodium Chloride 1,000 ml @  400 mls/hr Q2H30M PRN IV PATENCY;  Start 4/24/20 at 

15:31;  Stop 4/25/20 at 03:30;  Status DC


Info (PHARMACY MONITORING -- do not chart) 1 each PRN DAILY  PRN MC SEE 

COMMENTS;  Start 4/24/20 at 15:45;  Stop 5/26/20 at 14:14;  Status DC


Sodium Acetate 50 meq/Potassium Acetate 55 meq/ Magnesium Sulfate 20 meq/Calcium

Gluconate 10 meq/ Multivitamins 10 ml/Chromium/ Copper/Manganese/ Seleni/Zn 0.5 

ml/ Insulin Human Regular 35 unit/ Total Parenteral Nutrition/Amino 

Acids/Dextrose/ Fat Emulsion Intravenous 1,800 ml @  75 mls/hr TPN  CONT IV  

Last administered on 4/25/20at 22:03;  Start 4/25/20 at 22:00;  Stop 4/26/20 at 

21:59;  Status DC


Daptomycin 430 mg/ Sodium Chloride 50 ml @  100 mls/hr Q24H IV  Last admini

stered on 4/30/20at 13:00;  Start 4/25/20 at 13:00;  Stop 4/30/20 at 20:58;  

Status DC


Heparin Sodium (Porcine) 1000 unit/Sodium Chloride 1,001 ml @  1,001 mls/hr 1X  

ONCE IRR ;  Start 4/27/20 at 06:00;  Stop 4/27/20 at 06:59;  Status DC


Potassium Acetate 55 meq/Magnesium Sulfate 20 meq/ Calcium Gluconate 10 meq/ 

Multivitamins 10 ml/Chromium/ Copper/Manganese/ Seleni/Zn 0.5 ml/ Insulin Human 

Regular 35 unit/ Total Parenteral Nutrition/Amino Acids/Dextrose/ Fat Emulsion 

Intravenous 1,920 ml @  80 mls/hr TPN  CONT IV  Last administered on 4/26/20at 

22:10;  Start 4/26/20 at 22:00;  Stop 4/27/20 at 21:59;  Status DC


Dexamethasone Sodium Phosphate (Decadron) 4 mg STK-MED ONCE .ROUTE ;  Start 

4/27/20 at 10:56;  Stop 4/27/20 at 10:57;  Status DC


Ondansetron HCl (Zofran) 4 mg STK-MED ONCE .ROUTE ;  Start 4/27/20 at 10:56;  

Stop 4/27/20 at 10:57;  Status DC


Rocuronium Bromide (Zemuron) 50 mg STK-MED ONCE .ROUTE ;  Start 4/27/20 at 

10:56;  Stop 4/27/20 at 10:57;  Status DC


Fentanyl Citrate (Fentanyl 2ml Vial) 100 mcg STK-MED ONCE .ROUTE ;  Start 

4/27/20 at 10:56;  Stop 4/27/20 at 10:57;  Status DC


Bupivacaine HCl/ Epinephrine Bitart (Sensorcain-Epi 0.5%-1:059681 Mpf) 30 ml 

STK-MED ONCE .ROUTE  Last administered on 4/27/20at 12:01;  Start 4/27/20 at 

10:58;  Stop 4/27/20 at 10:58;  Status DC


Cellulose (Surgicel Hemostat 2x14) 1 each STK-MED ONCE .ROUTE ;  Start 4/27/20 

at 10:58;  Stop 4/27/20 at 10:59;  Status DC


Iohexol (Omnipaque 300 Mg/ml) 50 ml STK-MED ONCE .ROUTE ;  Start 4/27/20 at 

10:58;  Stop 4/27/20 at 10:59;  Status DC


Cellulose (Surgicel Hemostat 4x8) 1 each STK-MED ONCE .ROUTE ;  Start 4/27/20 at

10:58;  Stop 4/27/20 at 10:59;  Status DC


Bisacodyl (Dulcolax Supp) 10 mg STK-MED ONCE .ROUTE ;  Start 4/27/20 at 10:59;  

Stop 4/27/20 at 10:59;  Status DC


Heparin Sodium (Porcine) 1000 unit/Sodium Chloride 1,001 ml @  1,001 mls/hr 1X  

ONCE IRR ;  Start 4/27/20 at 12:00;  Stop 4/27/20 at 12:59;  Status DC


Propofol 20 ml @ As Directed STK-MED ONCE IV ;  Start 4/27/20 at 11:05;  Stop 

4/27/20 at 11:05;  Status DC


Sevoflurane (Ultane) 90 ml STK-MED ONCE IH ;  Start 4/27/20 at 11:05;  Stop 

4/27/20 at 11:05;  Status DC


Sevoflurane (Ultane) 60 ml STK-MED ONCE IH ;  Start 4/27/20 at 12:26;  Stop 

4/27/20 at 12:27;  Status DC


Propofol 20 ml @ As Directed STK-MED ONCE IV ;  Start 4/27/20 at 12:26;  Stop 

4/27/20 at 12:27;  Status DC


Phenylephrine HCl (PHENYLEPHRINE in 0.9% NACL PF) 1 mg STK-MED ONCE IV ;  Start 

4/27/20 at 12:34;  Stop 4/27/20 at 12:34;  Status DC


Heparin Sodium (Porcine) (Heparin Sodium) 5,000 unit Q12HR SQ  Last administered

on 5/6/20at 20:57;  Start 4/27/20 at 21:00;  Stop 5/7/20 at 09:59;  Status DC


Sodium Chloride (Normal Saline Flush) 3 ml QSHIFT  PRN IV AFTER MEDS AND BLOOD 

DRAWS;  Start 4/27/20 at 13:45


Naloxone HCl (Narcan) 0.4 mg PRN Q2MIN  PRN IV SEE INSTRUCTIONS Last 

administered on 6/6/20at 15:15;  Start 4/27/20 at 13:45


Sodium Chloride 1,000 ml @  25 mls/hr Q24H IV  Last administered on 5/26/20at 

13:37;  Start 4/27/20 at 13:37;  Stop 5/29/20 at 13:09;  Status DC


Naloxone HCl (Narcan) 0.4 mg PRN Q2MIN  PRN IV SEE INSTRUCTIONS;  Start 4/27/20 

at 14:30;  Status UNV


Sodium Chloride 1,000 ml @  25 mls/hr Q24H IV ;  Start 4/27/20 at 14:30;  Status

UNV


Hydromorphone HCl 30 ml @ 0 mls/hr CONT PRN  PRN IV PER PROTOCOL Last 

administered on 5/2/20at 16:08;  Start 4/27/20 at 14:30;  Stop 5/4/20 at 08:55; 

Status DC


Potassium Acetate 55 meq/Magnesium Sulfate 20 meq/ Calcium Gluconate 10 meq/ 

Multivitamins 10 ml/Chromium/ Copper/Manganese/ Seleni/Zn 0.5 ml/ Insulin Human 

Regular 35 unit/ Total Parenteral Nutrition/Amino Acids/Dextrose/ Fat Emulsion 

Intravenous 1,920 ml @  80 mls/hr TPN  CONT IV  Last administered on 4/27/20at 

22:01;  Start 4/27/20 at 22:00;  Stop 4/28/20 at 21:59;  Status DC


Bumetanide (Bumex) 2 mg BID92 IV  Last administered on 5/1/20at 13:50;  Start 

4/28/20 at 14:00;  Stop 5/2/20 at 14:10;  Status DC


Meropenem 1 gm/ Sodium Chloride 100 ml @  200 mls/hr Q8HRS IV  Last administered

on 5/22/20at 05:53;  Start 4/28/20 at 14:00;  Stop 5/22/20 at 09:31;  Status DC


Potassium Acetate 55 meq/Magnesium Sulfate 20 meq/ Calcium Gluconate 10 meq/ 

Multivitamins 10 ml/Chromium/ Copper/Manganese/ Seleni/Zn 0.5 ml/ Insulin Human 

Regular 35 unit/ Total Parenteral Nutrition/Amino Acids/Dextrose/ Fat Emulsion 

Intravenous 1,920 ml @  80 mls/hr TPN  CONT IV  Last administered on 4/28/20at 

22:02;  Start 4/28/20 at 22:00;  Stop 4/29/20 at 21:59;  Status DC


Hydromorphone HCl (Dilaudid Standard PCA) 12 mg STK-MED ONCE IV ;  Start 4/27/20

at 14:35;  Stop 4/28/20 at 13:53;  Status DC


Artificial Tears (Artificial Tears) 1 drop PRN Q15MIN  PRN OU DRY EYE Last 

administered on 6/23/20at 21:17;  Start 4/29/20 at 05:30


Hydromorphone HCl (Dilaudid Standard PCA) 12 mg STK-MED ONCE IV ;  Start 4/28/20

at 12:05;  Stop 4/29/20 at 09:15;  Status DC


Potassium Acetate 65 meq/Magnesium Sulfate 20 meq/ Calcium Gluconate 10 meq/ 

Multivitamins 10 ml/Chromium/ Copper/Manganese/ Seleni/Zn 0.5 ml/ Insulin Human 

Regular 30 unit/ Total Parenteral Nutrition/Amino Acids/Dextrose/ Fat Emulsion 

Intravenous 1,920 ml @  80 mls/hr TPN  CONT IV  Last administered on 4/29/20at 

22:22;  Start 4/29/20 at 22:00;  Stop 4/30/20 at 21:59;  Status DC


Cyclobenzaprine HCl (Flexeril) 10 mg PRN Q6HRS  PRN PO MUSCLE SPASMS;  Start 

4/30/20 at 10:45


Potassium Acetate 55 meq/Magnesium Sulfate 20 meq/ Calcium Gluconate 10 meq/ 

Multivitamins 10 ml/Chromium/ Copper/Manganese/ Seleni/Zn 0.5 ml/ Insulin Human 

Regular 30 unit/ Total Parenteral Nutrition/Amino Acids/Dextrose/ Fat Emulsion 

Intravenous 1,920 ml @  80 mls/hr TPN  CONT IV  Last administered on 5/1/20at 

01:00;  Start 4/30/20 at 22:00;  Stop 5/1/20 at 21:59;  Status DC


Magnesium Sulfate 50 ml @ 25 mls/hr 1X  ONCE IV  Last administered on 4/30/20at 

17:18;  Start 4/30/20 at 12:45;  Stop 4/30/20 at 14:44;  Status DC


Potassium Chloride/Water 100 ml @  100 mls/hr 1X  ONCE IV  Last administered on 

5/1/20at 11:27;  Start 5/1/20 at 12:00;  Stop 5/1/20 at 12:59;  Status DC


Hydromorphone HCl (Dilaudid Standard PCA) 12 mg STK-MED ONCE IV ;  Start 4/29/20

at 10:50;  Stop 5/1/20 at 11:02;  Status DC


Hydromorphone HCl (Dilaudid Standard PCA) 12 mg STK-MED ONCE IV ;  Start 4/30/20

at 13:47;  Stop 5/1/20 at 11:03;  Status DC


Potassium Acetate 30 meq/Magnesium Sulfate 20 meq/ Calcium Gluconate 10 meq/ 

Multivitamins 10 ml/Chromium/ Copper/Manganese/ Seleni/Zn 0.5 ml/ Insulin Human 

Regular 30 unit/ Potassium Chloride 30 meq/ Total Parenteral Nutrition/Amino 

Acids/Dextrose/ Fat Emulsion Intravenous 1,920 ml @  80 mls/hr TPN  CONT IV  

Last administered on 5/1/20at 22:34;  Start 5/1/20 at 22:00;  Stop 5/2/20 at 

21:59;  Status DC


Potassium Chloride/Water 100 ml @  100 mls/hr Q1H IV  Last administered on 

5/2/20at 13:05;  Start 5/2/20 at 07:00;  Stop 5/2/20 at 10:59;  Status DC


Magnesium Sulfate 50 ml @ 25 mls/hr 1X  ONCE IV  Last administered on 5/2/20at 

10:34;  Start 5/2/20 at 10:30;  Stop 5/2/20 at 12:29;  Status DC


Potassium Chloride 75 meq/ Magnesium Sulfate 20 meq/Calcium Gluconate 10 meq/ 

Multivitamins 10 ml/Chromium/ Copper/Manganese/ Seleni/Zn 0.5 ml/ Insulin Human 

Regular 30 unit/ Total Parenteral Nutrition/Amino Acids/Dextrose/ Fat Emulsion 

Intravenous 1,920 ml @  80 mls/hr TPN  CONT IV  Last administered on 5/2/20at 

21:51;  Start 5/2/20 at 22:00;  Stop 5/3/20 at 22:00;  Status DC


Potassium Chloride 75 meq/ Magnesium Sulfate 20 meq/Calcium Gluconate 10 meq/ 

Multivitamins 10 ml/Chromium/ Copper/Manganese/ Seleni/Zn 0.5 ml/ Insulin Human 

Regular 25 unit/ Total Parenteral Nutrition/Amino Acids/Dextrose/ Fat Emulsion 

Intravenous 1,920 ml @  80 mls/hr TPN  CONT IV  Last administered on 5/3/20at 

22:04;  Start 5/3/20 at 22:00;  Stop 5/4/20 at 21:59;  Status DC


Hydromorphone HCl (Dilaudid) 0.4 mg PRN Q4HRS  PRN IVP PAIN Last administered on

5/4/20at 10:57;  Start 5/4/20 at 09:00;  Stop 5/4/20 at 18:59;  Status DC


Micafungin Sodium 100 mg/Dextrose 100 ml @  100 mls/hr Q24H IV  Last 

administered on 5/26/20at 12:17;  Start 5/4/20 at 11:00;  Stop 5/27/20 at 09:59;

 Status DC


Daptomycin 485 mg/ Sodium Chloride 50 ml @  100 mls/hr Q24H IV  Last 

administered on 5/11/20at 13:10;  Start 5/4/20 at 11:00;  Stop 5/12/20 at 07:44;

 Status DC


Potassium Chloride 75 meq/ Magnesium Sulfate 15 meq/Calcium Gluconate 8 meq/ 

Multivitamins 10 ml/Chromium/ Copper/Manganese/ Seleni/Zn 0.5 ml/ Insulin Human 

Regular 25 unit/ Total Parenteral Nutrition/Amino Acids/Dextrose/ Fat Emulsion 

Intravenous 1,920 ml @  80 mls/hr TPN  CONT IV  Last administered on 5/4/20at 

23:08;  Start 5/4/20 at 22:00;  Stop 5/5/20 at 21:59;  Status DC


Haloperidol Lactate (Haldol Inj) 3 mg 1X  ONCE IVP  Last administered on 

5/4/20at 14:37;  Start 5/4/20 at 14:30;  Stop 5/4/20 at 14:31;  Status DC


Hydromorphone HCl (Dilaudid) 1 mg PRN Q4HRS  PRN IVP PAIN Last administered on 

5/18/20at 06:25;  Start 5/4/20 at 19:00;  Stop 5/18/20 at 17:10;  Status DC


Potassium Chloride 75 meq/ Magnesium Sulfate 15 meq/Calcium Gluconate 8 meq/ 

Multivitamins 10 ml/Chromium/ Copper/Manganese/ Seleni/Zn 0.5 ml/ Insulin Human 

Regular 20 unit/ Total Parenteral Nutrition/Amino Acids/Dextrose/ Fat Emulsion 

Intravenous 1,920 ml @  80 mls/hr TPN  CONT IV  Last administered on 5/5/20at 

22:10;  Start 5/5/20 at 22:00;  Stop 5/6/20 at 21:59;  Status DC


Lidocaine HCl (Buffered Lidocaine 1%) 3 ml STK-MED ONCE .ROUTE ;  Start 5/6/20 

at 11:31;  Stop 5/6/20 at 11:31;  Status DC


Lidocaine HCl (Buffered Lidocaine 1%) 3 ml STK-MED ONCE .ROUTE ;  Start 5/6/20 

at 12:28;  Stop 5/6/20 at 12:29;  Status DC


Lidocaine HCl (Buffered Lidocaine 1%) 6 ml 1X  ONCE INJ  Last administered on 

5/6/20at 12:53;  Start 5/6/20 at 12:45;  Stop 5/6/20 at 12:46;  Status DC


Potassium Chloride 75 meq/ Magnesium Sulfate 15 meq/Calcium Gluconate 8 meq/ 

Multivitamins 10 ml/Chromium/ Copper/Manganese/ Seleni/Zn 0.5 ml/ Insulin Human 

Regular 20 unit/ Total Parenteral Nutrition/Amino Acids/Dextrose/ Fat Emulsion 

Intravenous 1,920 ml @  80 mls/hr TPN  CONT IV  Last administered on 5/6/20at 

22:00;  Start 5/6/20 at 22:00;  Stop 5/7/20 at 21:59;  Status DC


Potassium Chloride 75 meq/ Magnesium Sulfate 15 meq/Calcium Gluconate 8 meq/ 

Multivitamins 10 ml/Chromium/ Copper/Manganese/ Seleni/Zn 0.5 ml/ Insulin Human 

Regular 15 unit/ Total Parenteral Nutrition/Amino Acids/Dextrose/ Fat Emulsion 

Intravenous 1,920 ml @  80 mls/hr TPN  CONT IV  Last administered on 5/7/20at 

22:28;  Start 5/7/20 at 22:00;  Stop 5/8/20 at 21:59;  Status DC


Vecuronium Bromide (Norcuron Bolus) 6 mg PRN Q6HRS  PRN IV VENT ASYNCHRONY;  

Start 5/7/20 at 19:15;  Stop 5/7/20 at 19:35;  Status DC


Bumetanide (Bumex) 2 mg 1X  ONCE IV  Last administered on 5/7/20at 22:09;  Start

5/7/20 at 19:45;  Stop 5/7/20 at 19:46;  Status DC


Lidocaine HCl (Buffered Lidocaine 1%) 3 ml STK-MED ONCE .ROUTE ;  Start 5/8/20 

at 07:59;  Stop 5/8/20 at 07:59;  Status DC


Midazolam HCl (Versed) 5 mg STK-MED ONCE .ROUTE ;  Start 5/8/20 at 08:36;  Stop 

5/8/20 at 08:36;  Status DC


Fentanyl Citrate (Fentanyl 5ml Vial) 250 mcg STK-MED ONCE .ROUTE ;  Start 5/8/20

at 08:36;  Stop 5/8/20 at 08:37;  Status DC


Lidocaine HCl (Buffered Lidocaine 1%) 3 ml 1X  ONCE IJ  Last administered on 

5/8/20at 09:30;  Start 5/8/20 at 09:15;  Stop 5/8/20 at 09:16;  Status DC


Midazolam HCl (Versed) 5 mg 1X  ONCE IV  Last administered on 5/8/20at 09:30;  

Start 5/8/20 at 09:15;  Stop 5/8/20 at 09:16;  Status DC


Fentanyl Citrate (Fentanyl 5ml Vial) 250 mcg 1X  ONCE IV  Last administered on 

5/8/20at 09:30;  Start 5/8/20 at 09:15;  Stop 5/8/20 at 09:16;  Status DC


Bumetanide (Bumex) 2 mg DAILY IV  Last administered on 5/18/20at 08:07;  Start 

5/8/20 at 10:00;  Stop 5/18/20 at 17:15;  Status DC


Potassium Chloride 75 meq/ Magnesium Sulfate 15 meq/ Multivitamins 10 

ml/Chromium/ Copper/Manganese/ Seleni/Zn 0.5 ml/ Insulin Human Regular 15 unit/ 

Total Parenteral Nutrition/Amino Acids/Dextrose/ Fat Emulsion Intravenous 1,920 

ml @  80 mls/hr TPN  CONT IV  Last administered on 5/8/20at 21:59;  Start 5/8/20

at 22:00;  Stop 5/9/20 at 21:59;  Status DC


Metoclopramide HCl (Reglan Vial) 10 mg PRN Q3HRS  PRN IVP NAUSEA/VOMITING-3rd 

choice Last administered on 5/14/20at 04:25;  Start 5/9/20 at 16:45


Potassium Chloride 75 meq/ Magnesium Sulfate 15 meq/ Multivitamins 10 

ml/Chromium/ Copper/Manganese/ Seleni/Zn 0.5 ml/ Insulin Human Regular 15 unit/ 

Total Parenteral Nutrition/Amino Acids/Dextrose/ Fat Emulsion Intravenous 1,920 

ml @  80 mls/hr TPN  CONT IV  Last administered on 5/9/20at 22:41;  Start 5/9/20

at 22:00;  Stop 5/10/20 at 21:59;  Status DC


Magnesium Sulfate 50 ml @ 25 mls/hr 1X  ONCE IV  Last administered on 5/10/20at 

10:44;  Start 5/10/20 at 09:00;  Stop 5/10/20 at 10:59;  Status DC


Potassium Chloride/Water 100 ml @  100 mls/hr 1X  ONCE IV  Last administered on 

5/10/20at 09:37;  Start 5/10/20 at 09:00;  Stop 5/10/20 at 09:59;  Status DC


Duloxetine HCl (Cymbalta) 30 mg DAILY PO  Last administered on 5/11/20at 09:48; 

Start 5/10/20 at 14:00;  Stop 5/13/20 at 10:25;  Status DC


Potassium Chloride 80 meq/ Magnesium Sulfate 20 meq/ Multivitamins 10 

ml/Chromium/ Copper/Manganese/ Seleni/Zn 0.5 ml/ Insulin Human Regular 15 unit/ 

Total Parenteral Nutrition/Amino Acids/Dextrose/ Fat Emulsion Intravenous 1,920 

ml @  80 mls/hr TPN  CONT IV  Last administered on 5/10/20at 21:42;  Start 

5/10/20 at 22:00;  Stop 5/11/20 at 21:59;  Status DC


Potassium Chloride 80 meq/ Magnesium Sulfate 20 meq/ Multivitamins 10 

ml/Chromium/ Copper/Manganese/ Seleni/Zn 0.5 ml/ Insulin Human Regular 15 unit/ 

Total Parenteral Nutrition/Amino Acids/Dextrose/ Fat Emulsion Intravenous 1,920 

ml @  80 mls/hr TPN  CONT IV  Last administered on 5/11/20at 22:20;  Start 

5/11/20 at 22:00;  Stop 5/12/20 at 21:59;  Status DC


Lidocaine HCl (Buffered Lidocaine 1%) 3 ml STK-MED ONCE .ROUTE ;  Start 5/12/20 

at 09:54;  Stop 5/12/20 at 09:55;  Status DC


Hydromorphone HCl (Dilaudid Standard PCA) 12 mg STK-MED ONCE IV ;  Start 5/1/20 

at 15:50;  Stop 5/12/20 at 11:24;  Status DC


Potassium Chloride 80 meq/ Magnesium Sulfate 20 meq/ Multivitamins 10 

ml/Chromium/ Copper/Manganese/ Seleni/Zn 0.5 ml/ Insulin Human Regular 15 unit/ 

Total Parenteral Nutrition/Amino Acids/Dextrose/ Fat Emulsion Intravenous 1,920 

ml @  80 mls/hr TPN  CONT IV  Last administered on 5/12/20at 21:40;  Start 

5/12/20 at 22:00;  Stop 5/13/20 at 21:59;  Status DC


Lidocaine HCl (Buffered Lidocaine 1%) 6 ml 1X  ONCE INJ  Last administered on 

5/12/20at 14:15;  Start 5/12/20 at 14:15;  Stop 5/12/20 at 14:16;  Status DC


Potassium Chloride 80 meq/ Magnesium Sulfate 20 meq/ Multivitamins 10 

ml/Chromium/ Copper/Manganese/ Seleni/Zn 1 ml/ Insulin Human Regular 15 unit/ 

Total Parenteral Nutrition/Amino Acids/Dextrose/ Fat Emulsion Intravenous 1,920 

ml @  80 mls/hr TPN  CONT IV  Last administered on 5/13/20at 22:04;  Start 

5/13/20 at 22:00;  Stop 5/14/20 at 21:59;  Status DC


Potassium Chloride/Water 100 ml @  100 mls/hr 1X  ONCE IV  Last administered on 

5/14/20at 11:34;  Start 5/14/20 at 11:00;  Stop 5/14/20 at 11:59;  Status DC


Potassium Chloride 90 meq/ Magnesium Sulfate 20 meq/ Multivitamins 10 

ml/Chromium/ Copper/Manganese/ Seleni/Zn 1 ml/ Insulin Human Regular 15 unit/ 

Total Parenteral Nutrition/Amino Acids/Dextrose/ Fat Emulsion Intravenous 1,920 

ml @  80 mls/hr TPN  CONT IV  Last administered on 5/14/20at 22:57;  Start 

5/14/20 at 22:00;  Stop 5/15/20 at 21:59;  Status DC


Potassium Chloride 90 meq/ Magnesium Sulfate 20 meq/ Multivitamins 10 

ml/Chromium/ Copper/Manganese/ Seleni/Zn 1 ml/ Insulin Human Regular 15 unit/ 

Total Parenteral Nutrition/Amino Acids/Dextrose/ Fat Emulsion Intravenous 1,920 

ml @  80 mls/hr TPN  CONT IV  Last administered on 5/15/20at 22:48;  Start 

5/15/20 at 22:00;  Stop 5/16/20 at 21:59;  Status DC


Potassium Chloride 90 meq/ Magnesium Sulfate 20 meq/ Multivitamins 10 

ml/Chromium/ Copper/Manganese/ Seleni/Zn 1 ml/ Insulin Human Regular 15 unit/ 

Total Parenteral Nutrition/Amino Acids/Dextrose/ Fat Emulsion Intravenous 1,890 

ml @  78.75 mls/ hr TPN  CONT IV  Last administered on 5/16/20at 22:15;  Start 

5/16/20 at 22:00;  Stop 5/17/20 at 21:59;  Status DC


Linezolid/Dextrose 300 ml @  300 mls/hr Q12HR IV  Last administered on 5/19/20at

21:08;  Start 5/17/20 at 09:00;  Stop 5/20/20 at 08:11;  Status DC


Daptomycin 450 mg/ Sodium Chloride 50 ml @  100 mls/hr Q24H IV  Last 

administered on 5/20/20at 09:25;  Start 5/17/20 at 09:00;  Stop 5/21/20 at 

08:30;  Status DC


Potassium Chloride 90 meq/ Magnesium Sulfate 20 meq/ Multivitamins 10 

ml/Chromium/ Copper/Manganese/ Seleni/Zn 1 ml/ Insulin Human Regular 15 unit/ 

Total Parenteral Nutrition/Amino Acids/Dextrose/ Fat Emulsion Intravenous 1,890 

ml @  78.75 mls/ hr TPN  CONT IV  Last administered on 5/17/20at 21:34;  Start 

5/17/20 at 22:00;  Stop 5/18/20 at 21:59;  Status DC


Lorazepam (Ativan Inj) 2 mg STK-MED ONCE .ROUTE ;  Start 5/17/20 at 14:58;  Stop

5/17/20 at 14:58;  Status DC


Metoprolol Tartrate (Lopressor Vial) 5 mg 1X  ONCE IVP  Last administered on 

5/17/20at 15:31;  Start 5/17/20 at 15:15;  Stop 5/17/20 at 15:16;  Status DC


Lorazepam (Ativan Inj) 2 mg 1X  ONCE IVP  Last administered on 5/17/20at 15:30; 

Start 5/17/20 at 15:15;  Stop 5/17/20 at 15:16;  Status DC


Enoxaparin Sodium (Lovenox 40mg Syringe) 40 mg Q24H SQ  Last administered on 

6/5/20at 17:44;  Start 5/17/20 at 17:00;  Stop 6/7/20 at 06:50;  Status DC


Lorazepam (Ativan Inj) 1 mg PRN Q4HRS  PRN IVP ANXIETY / AGITATION MILD-MOD Last

administered on 5/31/20at 15:55;  Start 5/17/20 at 19:15;  Stop 6/2/20 at 11:45;

 Status DC


Lorazepam (Ativan Inj) 2 mg PRN Q4HRS  PRN IVP ANXIETY / AGITATION SEVERE Last 

administered on 6/1/20at 07:55;  Start 5/17/20 at 19:15;  Stop 6/2/20 at 11:45; 

Status DC


Fentanyl Citrate (Fentanyl 2ml Vial) 50 mcg PRN Q4HRS  PRN IVP SEVERE PAIN Last 

administered on 6/13/20at 05:15;  Start 5/18/20 at 13:15;  Stop 6/14/20 at 

09:29;  Status DC


Fentanyl Citrate (Fentanyl 2ml Vial) 25 mcg PRN Q4HRS  PRN IVP MODERATE PAIN 

Last administered on 6/13/20at 00:27;  Start 5/18/20 at 13:15;  Stop 6/14/20 at 

09:30;  Status DC


Potassium Chloride 90 meq/ Magnesium Sulfate 20 meq/ Multivitamins 10 ml

/Chromium/ Copper/Manganese/ Seleni/Zn 1 ml/ Insulin Human Regular 15 unit/ 

Total Parenteral Nutrition/Amino Acids/Dextrose/ Fat Emulsion Intravenous 1,890 

ml @  78.75 mls/ hr TPN  CONT IV  Last administered on 5/18/20at 22:18;  Start 

5/18/20 at 22:00;  Stop 5/19/20 at 21:59;  Status DC


Furosemide (Lasix) 40 mg 1X  ONCE IVP  Last administered on 5/18/20at 21:51;  

Start 5/18/20 at 21:45;  Stop 5/18/20 at 21:48;  Status DC


Albumin Human 100 ml @  100 mls/hr 1X PRN  PRN IV SEE COMMENTS;  Start 5/19/20 

at 01:30


Furosemide (Lasix) 40 mg [id number] IVP  Last administered on 6/3/20at 08:04;  Start 

5/19/20 at 14:00;  Stop 6/3/20 at 13:07;  Status DC


Potassium Chloride 90 meq/ Magnesium Sulfate 20 meq/ Multivitamins 10 

ml/Chromium/ Copper/Manganese/ Seleni/Zn 1 ml/ Insulin Human Regular 15 unit/ 

Total Parenteral Nutrition/Amino Acids/Dextrose/ Fat Emulsion Intravenous 1,800 

ml @  75 mls/hr TPN  CONT IV  Last administered on 5/19/20at 22:31;  Start 

5/19/20 at 22:00;  Stop 5/20/20 at 21:59;  Status DC


Potassium Chloride 90 meq/ Magnesium Sulfate 20 meq/ Multivitamins 10 

ml/Chromium/ Copper/Manganese/ Seleni/Zn 1 ml/ Insulin Human Regular 15 unit/ 

Total Parenteral Nutrition/Amino Acids/Dextrose/ Fat Emulsion Intravenous 1,800 

ml @  75 mls/hr TPN  CONT IV  Last administered on 5/20/20at 22:28;  Start 

5/20/20 at 22:00;  Stop 5/21/20 at 21:59;  Status DC


Potassium Chloride 110 meq/ Magnesium Sulfate 20 meq/ Multivitamins 10 

ml/Chromium/ Copper/Manganese/ Seleni/Zn 1 ml/ Insulin Human Regular 15 unit/ 

Total Parenteral Nutrition/Amino Acids/Dextrose/ Fat Emulsion Intravenous 1,800 

ml @  75 mls/hr TPN  CONT IV  Last administered on 5/21/20at 22:01;  Start 5/2 1/20 at 22:00;  Stop 5/22/20 at 21:59;  Status DC


Saliva Substitute (Biotene Moisturizing Mouth) 2 spray PRN Q15MIN  PRN PO DRY 

MOUTH;  Start 5/21/20 at 11:00


Potassium Chloride 110 meq/ Magnesium Sulfate 20 meq/ Multivitamins 10 

ml/Chromium/ Copper/Manganese/ Seleni/Zn 1 ml/ Insulin Human Regular 15 unit/ 

Total Parenteral Nutrition/Amino Acids/Dextrose/ Fat Emulsion Intravenous 1,800 

ml @  75 mls/hr TPN  CONT IV  Last administered on 5/22/20at 22:21;  Start 

5/22/20 at 22:00;  Stop 5/23/20 at 21:59;  Status DC


Potassium Chloride 110 meq/ Magnesium Sulfate 20 meq/ Multivitamins 10 

ml/Chromium/ Copper/Manganese/ Seleni/Zn 1 ml/ Insulin Human Regular 15 unit/ 

Total Parenteral Nutrition/Amino Acids/Dextrose/ Fat Emulsion Intravenous 1,800 

ml @  75 mls/hr TPN  CONT IV  Last administered on 5/23/20at 22:04;  Start 

5/23/20 at 22:00;  Stop 5/24/20 at 21:59;  Status DC


Potassium Chloride 110 meq/ Magnesium Sulfate 20 meq/ Multivitamins 10 

ml/Chromium/ Copper/Manganese/ Seleni/Zn 1 ml/ Insulin Human Regular 15 unit/ To

[place] Parenteral Nutrition/Amino Acids/Dextrose/ Fat Emulsion Intravenous 1,800 ml

@  75 mls/hr TPN  CONT IV  Last administered on 5/24/20at 22:48;  Start 5/24/20 

at 22:00;  Stop 5/25/20 at 21:59;  Status DC


Potassium Chloride 70 meq/ Magnesium Sulfate 20 meq/ Multivitamins 10 

ml/Chromium/ Copper/Manganese/ Seleni/Zn 1 ml/ Insulin Human Regular 15 unit/ 

Total Parenteral Nutrition/Amino Acids/Dextrose/ Fat Emulsion Intravenous 1,800 

ml @  75 mls/hr TPN  CONT IV  Last administered on 5/25/20at 21:39;  Start 5/2 5/20 at 22:00;  Stop 5/26/20 at 21:59;  Status DC


Meropenem 500 mg/ Sodium Chloride 50 ml @  100 mls/hr Q6HRS IV  Last administe

red on 5/27/20at 06:02;  Start 5/25/20 at 18:00;  Stop 5/27/20 at 09:59;  Status

DC


Barium Sulfate (Varibar Thin Liquid Apple) 148 gm 1X  ONCE PO ;  Start 5/26/20 

at 11:45;  Stop 5/26/20 at 11:49;  Status DC


Potassium Chloride 70 meq/ Magnesium Sulfate 20 meq/ Multivitamins 10 

ml/Chromium/ Copper/Manganese/ Seleni/Zn 1 ml/ Insulin Human Regular 15 unit/ 

Total Parenteral Nutrition/Amino Acids/Dextrose/ Fat Emulsion Intravenous 1,800 

ml @  75 mls/hr TPN  CONT IV  Last administered on 5/26/20at 22:27;  Start 5 /26/20 at 22:00;  Stop 5/27/20 at 21:59;  Status DC


Piperacillin Sod/ Tazobactam Sod 3.375 gm/Sodium Chloride 50 ml @  100 mls/hr 

Q6HRS IV  Last administered on 6/4/20at 06:10;  Start 5/27/20 at 12:00;  Stop 

6/4/20 at 07:26;  Status DC


Potassium Chloride 70 meq/ Magnesium Sulfate 20 meq/ Multivitamins 10 

ml/Chromium/ Copper/Manganese/ Seleni/Zn 1 ml/ Insulin Human Regular 15 unit/ 

Total Parenteral Nutrition/Amino Acids/Dextrose/ Fat Emulsion Intravenous 1,800 

ml @  75 mls/hr TPN  CONT IV  Last administered on 5/27/20at 22:03;  Start 

5/27/20 at 22:00;  Stop 5/28/20 at 21:59;  Status DC


Potassium Chloride 70 meq/ Magnesium Sulfate 20 meq/ Multivitamins 10 

ml/Chromium/ Copper/Manganese/ Seleni/Zn 1 ml/ Insulin Human Regular 15 unit/ 

Total Parenteral Nutrition/Amino Acids/Dextrose/ Fat Emulsion Intravenous 1,800 

ml @  75 mls/hr TPN  CONT IV  Last administered on 5/28/20at 22:33;  Start 5 /28/20 at 22:00;  Stop 5/29/20 at 21:59;  Status DC


Potassium Chloride 70 meq/ Magnesium Sulfate 20 meq/ Multivitamins 10 ml/Chr

omium/ Copper/Manganese/ Seleni/Zn 1 ml/ Insulin Human Regular 15 unit/ Total 

Parenteral Nutrition/Amino Acids/Dextrose/ Fat Emulsion Intravenous 1,800 ml @  

75 mls/hr TPN  CONT IV  Last administered on 5/29/20at 23:13;  Start 5/29/20 at 

22:00;  Stop 5/30/20 at 21:59;  Status DC


Potassium Chloride 80 meq/ Magnesium Sulfate 20 meq/ Multivitamins 10 

ml/Chromium/ Copper/Manganese/ Seleni/Zn 1 ml/ Insulin Human Regular 15 unit/ 

Total Parenteral Nutrition/Amino Acids/Dextrose/ Fat Emulsion Intravenous 1,800 

ml @  75 mls/hr TPN  CONT IV  Last administered on 5/30/20at 22:30;  Start 

5/30/20 at 22:00;  Stop 5/31/20 at 21:59;  Status DC


Potassium Chloride 80 meq/ Magnesium Sulfate 20 meq/ Multivitamins 10 

ml/Chromium/ Copper/Manganese/ Seleni/Zn 1 ml/ Insulin Human Regular 15 unit/ 

Total Parenteral Nutrition/Amino Acids/Dextrose/ Fat Emulsion Intravenous 1,800 

ml @  75 mls/hr TPN  CONT IV  Last administered on 5/31/20at 21:54;  Start 

5/31/20 at 22:00;  Stop 6/1/20 at 21:59;  Status DC


Potassium Chloride/Water 100 ml @  100 mls/hr 1X  ONCE IV  Last administered on 

6/1/20at 10:15;  Start 6/1/20 at 10:00;  Stop 6/1/20 at 10:59;  Status DC


Potassium Chloride 90 meq/ Magnesium Sulfate 20 meq/ Multivitamins 10 

ml/Chromium/ Copper/Manganese/ Seleni/Zn 1 ml/ Insulin Human Regular 20 unit/ 

Total Parenteral Nutrition/Amino Acids/Dextrose/ Fat Emulsion Intravenous 1,800 

ml @  75 mls/hr TPN  CONT IV  Last administered on 6/1/20at 22:28;  Start 6/1/20

at 22:00;  Stop 6/2/20 at 21:59;  Status DC


Potassium Chloride 90 meq/ Magnesium Sulfate 20 meq/ Multivitamins 10 

ml/Chromium/ Copper/Manganese/ Seleni/Zn 1 ml/ Insulin Human Regular 20 unit/ 

Total Parenteral Nutrition/Amino Acids/Dextrose/ Fat Emulsion Intravenous 1,800 

ml @  75 mls/hr TPN  CONT IV  Last administered on 6/2/20at 22:08;  Start 6/2/20

at 22:00;  Stop 6/3/20 at 21:59;  Status DC


Lorazepam (Ativan Inj) 0.25 mg PRN Q4HRS  PRN IVP ANXIETY / AGITATION Last 

administered on 6/27/20at 13:37;  Start 6/3/20 at 07:30


Potassium Chloride 90 meq/ Magnesium Sulfate 20 meq/ Multivitamins 10 

ml/Chromium/ Copper/Manganese/ Seleni/Zn 1 ml/ Insulin Human Regular 20 unit/ 

Total Parenteral Nutrition/Amino Acids/Dextrose/ Fat Emulsion Intravenous 1,800 

ml @  75 mls/hr TPN  CONT IV  Last administered on 6/3/20at 23:13;  Start 6/3/20

at 22:00;  Stop 6/4/20 at 21:59;  Status DC


Furosemide (Lasix) 40 mg DAILY IVP  Last administered on 6/5/20at 11:14;  Start 

6/3/20 at 13:30;  Stop 6/7/20 at 09:12;  Status DC


Fluoxetine HCl (PROzac) 20 mg QHS PEG  Last administered on 6/27/20at 21:52;  

Start 6/4/20 at 21:00


Fentanyl (Duragesic 50mcg/ Hr Patch) 1 patch Q72H TD  Last administered on 

6/4/20at 21:22;  Start 6/4/20 at 21:00;  Stop 6/13/20 at 12:00;  Status DC


Potassium Chloride 40 meq/ Potassium Acetate 60 meq/Magnesium Sulfate 10 meq/ 

Multivitamins 10 ml/Chromium/ Copper/Manganese/ Seleni/Zn 1 ml/ Insulin Human 

Regular 20 unit/ Total Parenteral Nutrition/Amino Acids/Dextrose/ Fat Emulsion 

Intravenous 1,800 ml @  75 mls/hr TPN  CONT IV  Last administered on 6/5/20at 

00:03;  Start 6/4/20 at 22:00;  Stop 6/5/20 at 21:59;  Status DC


Potassium Acetate 80 meq/Magnesium Sulfate 5 meq/ Multivitamins 10 ml/Chromium/ 

Copper/Manganese/ Seleni/Zn 1 ml/ Insulin Human Regular 20 unit/ Total 

Parenteral Nutrition/Amino Acids/Dextrose/ Fat Emulsion Intravenous 1,920 ml @  

80 mls/hr TPN  CONT IV  Last administered on 6/5/20at 21:59;  Start 6/5/20 at 

22:00;  Stop 6/6/20 at 21:59;  Status DC


Potassium Acetate 60 meq/Magnesium Sulfate 5 meq/ Multivitamins 10 ml/Chromium/ 

Copper/Manganese/ Seleni/Zn 1 ml/ Insulin Human Regular 30 unit/ Total 

Parenteral Nutrition/Amino Acids/Dextrose/ Fat Emulsion Intravenous 1,920 ml @  

80 mls/hr TPN  CONT IV  Last administered on 6/6/20at 21:54;  Start 6/6/20 at 

22:00;  Stop 6/7/20 at 21:59;  Status DC


Norepinephrine Bitartrate 8 mg/ Dextrose 258 ml @  13.332 mls/ hr CONT  PRN IV 

PER PROTOCOL Last administered on 6/7/20at 21:46;  Start 6/7/20 at 06:30


Albumin Human 500 ml @  125 mls/hr 1X  ONCE IV  Last administered on 6/7/20at 

08:10;  Start 6/7/20 at 08:15;  Stop 6/7/20 at 12:14;  Status DC


Potassium Acetate 40 meq/Magnesium Sulfate 5 meq/ Multivitamins 10 ml/Chromium/ 

Copper/Manganese/ Seleni/Zn 1 ml/ Insulin Human Regular 30 unit/ Total 

Parenteral Nutrition/Amino Acids/Dextrose/ Fat Emulsion Intravenous 1,920 ml @  

80 mls/hr TPN  CONT IV  Last administered on 6/7/20at 22:23;  Start 6/7/20 at 

22:00;  Stop 6/8/20 at 21:59;  Status DC


Meropenem 1 gm/ Sodium Chloride 100 ml @  200 mls/hr Q8HRS IV ;  Start 6/7/20 at

14:00;  Status Cancel


Meropenem 1 gm/ Sodium Chloride 100 ml @  200 mls/hr Q8HRS IV  Last administered

on 6/7/20at 11:04;  Start 6/7/20 at 10:00;  Stop 6/7/20 at 13:00;  Status DC


Meropenem 1 gm/ Sodium Chloride 100 ml @  200 mls/hr Q12HR IV  Last administered

on 6/25/20at 08:27;  Start 6/7/20 at 21:00;  Stop 6/25/20 at 08:56;  Status DC


Sodium Chloride 1,000 ml @  1,000 mls/hr 1X  ONCE IV  Last administered on 

6/7/20at 11:06;  Start 6/7/20 at 10:45;  Stop 6/7/20 at 11:44;  Status DC


Micafungin Sodium 100 mg/Dextrose 100 ml @  100 mls/hr Q24H IV  Last 

administered on 6/24/20at 12:34;  Start 6/7/20 at 11:00;  Stop 6/25/20 at 08:56;

 Status DC


Daptomycin 410 mg/ Sodium Chloride 50 ml @  100 mls/hr Q24H IV  Last administe

red on 6/9/20at 13:33;  Start 6/7/20 at 14:00;  Stop 6/10/20 at 08:30;  Status 

DC


Midazolam HCl (Versed) 2 mg STK-MED ONCE .ROUTE ;  Start 6/7/20 at 14:47;  Stop 

6/7/20 at 14:48;  Status DC


Fentanyl Citrate (Fentanyl 2ml Vial) 100 mcg STK-MED ONCE .ROUTE ;  Start 6/7/20

at 14:47;  Stop 6/7/20 at 14:48;  Status DC


Flumazenil (Romazicon) 0.5 mg STK-MED ONCE IV ;  Start 6/7/20 at 14:48;  Stop 

6/7/20 at 14:48;  Status DC


Naloxone HCl (Narcan) 0.4 mg STK-MED ONCE .ROUTE ;  Start 6/7/20 at 14:48;  Stop

6/7/20 at 14:48;  Status DC


Lidocaine HCl (Lidocaine 1% 20ml Vial) 20 ml STK-MED ONCE .ROUTE ;  Start 6/7/20

at 14:48;  Stop 6/7/20 at 14:48;  Status DC


Midazolam HCl (Versed) 2 mg 1X  ONCE IV  Last administered on 6/7/20at 15:28;  

Start 6/7/20 at 15:00;  Stop 6/7/20 at 15:01;  Status DC


Fentanyl Citrate (Fentanyl 2ml Vial) 100 mcg 1X  ONCE IV  Last administered on 

6/7/20at 15:28;  Start 6/7/20 at 15:00;  Stop 6/7/20 at 15:01;  Status DC


Lidocaine HCl (Lidocaine 1% 20ml Vial) 20 ml 1X  ONCE INJ  Last administered on 

6/7/20at 15:30;  Start 6/7/20 at 15:00;  Stop 6/7/20 at 15:01;  Status DC


Sodium Chloride 1,000 ml @  100 mls/hr Q10H IV  Last administered on 6/16/20at 

07:30;  Start 6/7/20 at 20:00;  Stop 6/16/20 at 11:26;  Status DC


Sodium Bicarbonate (Sodium Bicarb Adult 8.4% Syr) 50 meq 1X  ONCE IV  Last 

administered on 6/7/20at 21:47;  Start 6/7/20 at 22:00;  Stop 6/7/20 at 22:01;  

Status DC


Potassium Acetate 40 meq/Magnesium Sulfate 5 meq/ Multivitamins 10 ml/Chromium/ 

Copper/Manganese/ Seleni/Zn 1 ml/ Insulin Human Regular 30 unit/ Total 

Parenteral Nutrition/Amino Acids/Dextrose/ Fat Emulsion Intravenous 1,920 ml @  

80 mls/hr TPN  CONT IV  Last administered on 6/8/20at 22:28;  Start 6/8/20 at 

22:00;  Stop 6/9/20 at 21:59;  Status DC


Sodium Chloride 500 ml @  500 mls/hr 1X  ONCE IV  Last administered on 6/9/20at 

06:39;  Start 6/9/20 at 06:45;  Stop 6/9/20 at 07:44;  Status DC


Potassium Acetate 40 meq/Magnesium Sulfate 5 meq/ Multivitamins 10 ml/Chromium/ 

Copper/Manganese/ Seleni/Zn 1 ml/ Insulin Human Regular 30 unit/ Total 

Parenteral Nutrition/Amino Acids/Dextrose/ Fat Emulsion Intravenous 1,920 ml @  

80 mls/hr TPN  CONT IV  Last administered on 6/9/20at 22:03;  Start 6/9/20 at 

22:00;  Stop 6/10/20 at 21:59;  Status DC


Metoprolol Tartrate (Lopressor Vial) 5 mg PRN Q6HRS  PRN IVP HYPERTENSION Last 

administered on 6/18/20at 10:14;  Start 6/10/20 at 09:00


Potassium Acetate 40 meq/Magnesium Sulfate 5 meq/ Multivitamins 10 ml/Chromium/ 

Copper/Manganese/ Seleni/Zn 1 ml/ Insulin Human Regular 30 unit/ Total 

Parenteral Nutrition/Amino Acids/Dextrose/ Fat Emulsion Intravenous 1,920 ml @  

80 mls/hr TPN  CONT IV  Last administered on 6/10/20at 21:26;  Start 6/10/20 at 

22:00;  Stop 6/11/20 at 21:59;  Status DC


Potassium Acetate 40 meq/Magnesium Sulfate 5 meq/ Multivitamins 10 ml/Chromium/ 

Copper/Manganese/ Seleni/Zn 1 ml/ Insulin Human Regular 30 unit/ Total 

Parenteral Nutrition/Amino Acids/Dextrose/ Fat Emulsion Intravenous 1,920 ml @  

80 mls/hr TPN  CONT IV  Last administered on 6/11/20at 23:23;  Start 6/11/20 at 

22:00;  Stop 6/12/20 at 21:59;  Status DC


Potassium Acetate 40 meq/Magnesium Sulfate 5 meq/ Multivitamins 10 ml/Chromium/ 

Copper/Manganese/ Seleni/Zn 1 ml/ Insulin Human Regular 30 unit/ Total 

Parenteral Nutrition/Amino Acids/Dextrose/ Fat Emulsion Intravenous 1,920 ml @  

80 mls/hr TPN  CONT IV  Last administered on 6/12/20at 21:35;  Start 6/12/20 at 

22:00;  Stop 6/13/20 at 21:59;  Status DC


Furosemide (Lasix) 20 mg 1X  ONCE IVP  Last administered on 6/13/20at 06:26;  

Start 6/13/20 at 06:15;  Stop 6/13/20 at 06:16;  Status DC


Methylprednisolone Sodium Succinate (SOLU-Medrol 125MG VIAL) 125 mg 1X  ONCE IV 

Last administered on 6/13/20at 06:26;  Start 6/13/20 at 06:15;  Stop 6/13/20 at 

06:16;  Status DC


Albuterol/ Ipratropium (Duoneb) 3 ml Q4HRS NEB  Last administered on 6/29/20at 

08:32;  Start 6/13/20 at 08:00


Fentanyl Citrate 30 ml @ 0 mls/hr CONT  PRN IV SEE PROTOCOL Last administered on

6/27/20at 23:26;  Start 6/13/20 at 06:00


Propofol 100 ml @ 0 mls/hr CONT  PRN IV SEE PROTOCOL Last administered on 

6/20/20at 23:50;  Start 6/13/20 at 06:00


Fentanyl Citrate (Fentanyl 2ml Vial) 25 mcg PRN Q1HR  PRN IV SEE COMMENTS;  

Start 6/13/20 at 06:00


Fentanyl Citrate (Fentanyl 2ml Vial) 50 mcg PRN Q1HR  PRN IV SEE COMMENTS;  

Start 6/13/20 at 06:00


Chlorhexidine Gluconate (Peridex) 15 ml BID MM ;  Start 6/13/20 at 09:00;  Stop 

6/13/20 at 07:58;  Status DC


Potassium Acetate 40 meq/Magnesium Sulfate 5 meq/ Multivitamins 10 ml/Chromium/ 

Copper/Manganese/ Seleni/Zn 1 ml/ Insulin Human Regular 30 unit/ Total 

Parenteral Nutrition/Amino Acids/Dextrose/ Fat Emulsion Intravenous 1,920 ml @  

80 mls/hr TPN  CONT IV  Last administered on 6/13/20at 21:19;  Start 6/13/20 at 

22:00;  Stop 6/14/20 at 21:59;  Status DC


Acetylcysteine (Mucomyst 20% Resp Treatment) 600 mg BID NEB  Last administered 

on 6/19/20at 09:33;  Start 6/13/20 at 21:00;  Stop 6/19/20 at 10:39;  Status DC


Magnesium Sulfate 100 ml @  25 mls/hr 1X  ONCE IV  Last administered on 

6/13/20at 15:48;  Start 6/13/20 at 15:45;  Stop 6/13/20 at 19:44;  Status DC


Potassium Acetate 40 meq/Magnesium Sulfate 5 meq/ Multivitamins 10 ml/Chromium/ 

Copper/Manganese/ Seleni/Zn 1 ml/ Insulin Human Regular 30 unit/ Total 

Parenteral Nutrition/Amino Acids/Dextrose/ Fat Emulsion Intravenous 1,920 ml @  

80 mls/hr TPN  CONT IV  Last administered on 6/14/20at 21:35;  Start 6/14/20 at 

22:00;  Stop 6/15/20 at 21:59;  Status DC


Potassium Chloride/Water 100 ml @  100 mls/hr Q1H IV  Last administered on 

6/15/20at 08:31;  Start 6/15/20 at 07:00;  Stop 6/15/20 at 08:59;  Status DC


Potassium Acetate 40 meq/Magnesium Sulfate 5 meq/ Multivitamins 10 ml/Chromium/ 

Copper/Manganese/ Seleni/Zn 1 ml/ Insulin Human Regular 30 unit/ Total 

Parenteral Nutrition/Amino Acids/Dextrose/ Fat Emulsion Intravenous 1,920 ml @  

80 mls/hr TPN  CONT IV  Last administered on 6/15/20at 21:54;  Start 6/15/20 at 

22:00;  Stop 6/16/20 at 19:34;  Status DC


Lidocaine HCl (Buffered Lidocaine 1%) 3 ml STK-MED ONCE .ROUTE ;  Start 6/15/20 

at 12:14;  Stop 6/15/20 at 12:14;  Status DC


Lidocaine HCl (Buffered Lidocaine 1%) 3 ml 1X  ONCE IJ  Last administered on 

6/15/20at 13:11;  Start 6/15/20 at 13:00;  Stop 6/15/20 at 13:01;  Status DC


Magnesium Sulfate 50 ml @ 25 mls/hr 1X  ONCE IV ;  Start 6/16/20 at 08:15;  Stop

6/16/20 at 10:14;  Status DC


Potassium Acetate 40 meq/Magnesium Sulfate 10 meq/ Multivitamins 10 ml/Chromium/

Copper/Manganese/ Seleni/Zn 1 ml/ Insulin Human Regular 20 unit/ Total 

Parenteral Nutrition/Amino Acids/Dextrose/ Fat Emulsion Intravenous 1,920 ml @  

80 mls/hr TPN  CONT IV  Last administered on 6/16/20at 21:32;  Start 6/16/20 at 

22:00;  Stop 6/17/20 at 21:59;  Status DC


Potassium Chloride/Water 100 ml @  100 mls/hr Q1H IV  Last administered on 

6/17/20at 09:12;  Start 6/17/20 at 08:00;  Stop 6/17/20 at 09:59;  Status DC


Alteplase, Recombinant (Cathflo For Central Catheter Clearance) 4 mg 1X  ONCE I

NT CAT ;  Start 6/17/20 at 09:15;  Stop 6/17/20 at 09:16;  Status UNV


Alteplase, Recombinant (Cathflo For Central Catheter Clearance) 4 mg 1X  ONCE 

INT CAT ;  Start 6/17/20 at 09:15;  Stop 6/17/20 at 09:16;  Status UNV


Alteplase, Recombinant (Cathflo For Central Catheter Clearance) 4 mg 1X  ONCE 

INT CAT ;  Start 6/17/20 at 09:15;  Stop 6/17/20 at 09:16;  Status UNV


Alteplase, Recombinant 4 mg/ Sodium Chloride 20 ml @ 20 mls/hr 1X  ONCE IV  Last

administered on 6/17/20at 10:10;  Start 6/17/20 at 10:00;  Stop 6/17/20 at 

10:59;  Status DC


Alteplase, Recombinant 4 mg/ Sodium Chloride 20 ml @ 20 mls/hr 1X  ONCE IV  Last

administered on 6/17/20at 10:09;  Start 6/17/20 at 10:00;  Stop 6/17/20 at 

10:59;  Status DC


Alteplase, Recombinant 4 mg/ Sodium Chloride 20 ml @ 20 mls/hr 1X  ONCE IV  Last

administered on 6/17/20at 10:09;  Start 6/17/20 at 10:00;  Stop 6/17/20 at 

10:59;  Status DC


Potassium Acetate 60 meq/Magnesium Sulfate 10 meq/ Multivitamins 10 ml/Chromium/

Copper/Manganese/ Seleni/Zn 1 ml/ Insulin Human Regular 20 unit/ Total 

Parenteral Nutrition/Amino Acids/Dextrose/ Fat Emulsion Intravenous 1,920 ml @  

80 mls/hr TPN  CONT IV  Last administered on 6/17/20at 21:55;  Start 6/17/20 at 

22:00;  Stop 6/18/20 at 21:59;  Status DC


Albumin Human 500 ml @  125 mls/hr 1X  ONCE IV  Last administered on 6/18/20at 

12:01;  Start 6/18/20 at 11:15;  Stop 6/18/20 at 15:14;  Status DC


Sodium Chloride 500 ml @  500 mls/hr 1X  ONCE IV  Last administered on 6/18/20at

13:50;  Start 6/18/20 at 11:15;  Stop 6/18/20 at 12:14;  Status DC


Potassium Acetate 60 meq/Magnesium Sulfate 14 meq/ Multivitamins 10 ml/Chromium/

Copper/Manganese/ Seleni/Zn 1 ml/ Insulin Human Regular 20 unit/ Total 

Parenteral Nutrition/Amino Acids/Dextrose/ Fat Emulsion Intravenous 1,920 ml @  

80 mls/hr TPN  CONT IV  Last administered on 6/18/20at 22:26;  Start 6/18/20 at 

22:00;  Stop 6/19/20 at 21:59;  Status DC


Ciprofloxacin/ Dextrose 200 ml @  200 mls/hr Q12HR IV  Last administered on 

6/25/20at 08:27;  Start 6/18/20 at 21:00;  Stop 6/25/20 at 08:56;  Status DC


Albumin Human 250 ml @  62.5 mls/hr 1X  ONCE IV  Last administered on 6/19/20at 

11:09;  Start 6/19/20 at 11:00;  Stop 6/19/20 at 14:59;  Status DC


Furosemide (Lasix) 20 mg 1X  ONCE IVP  Last administered on 6/19/20at 14:52;  

Start 6/19/20 at 10:45;  Stop 6/19/20 at 10:49;  Status DC


Potassium Acetate 60 meq/Magnesium Sulfate 14 meq/ Multivitamins 10 ml/Chromium/

Copper/Manganese/ Seleni/Zn 1 ml/ Insulin Human Regular 15 unit/ Total 

Parenteral Nutrition/Amino Acids/Dextrose/ Fat Emulsion Intravenous 1,920 ml @  

80 mls/hr TPN  CONT IV  Last administered on 6/19/20at 22:08;  Start 6/19/20 at 

22:00;  Stop 6/20/20 at 21:59;  Status DC


Potassium Acetate 60 meq/Magnesium Sulfate 14 meq/ Multivitamins 10 ml/Chromium/

Copper/Manganese/ Seleni/Zn 1 ml/ Insulin Human Regular 15 unit/ Total 

Parenteral Nutrition/Amino Acids/Dextrose/ Fat Emulsion Intravenous 1,920 ml @  

80 mls/hr TPN  CONT IV  Last administered on 6/20/20at 22:12;  Start 6/20/20 at 

22:00;  Stop 6/21/20 at 21:59;  Status DC


Potassium Acetate 60 meq/Magnesium Sulfate 14 meq/ Multivitamins 10 ml/Chromium/

Copper/Manganese/ Seleni/Zn 1 ml/ Insulin Human Regular 15 unit/ Total 

Parenteral Nutrition/Amino Acids/Dextrose/ Fat Emulsion Intravenous 1,920 ml @  

80 mls/hr TPN  CONT IV  Last administered on 6/21/20at 22:22;  Start 6/21/20 at 

22:00;  Stop 6/22/20 at 21:59;  Status DC


Furosemide (Lasix) 20 mg 1X  ONCE IVP  Last administered on 6/22/20at 11:07;  

Start 6/22/20 at 10:30;  Stop 6/22/20 at 10:34;  Status DC


Potassium Acetate 60 meq/Magnesium Sulfate 14 meq/ Multivitamins 10 ml/Chromium/

Copper/Manganese/ Seleni/Zn 1 ml/ Insulin Human Regular 15 unit/ Sodium Chloride

20 meq/Total Parenteral Nutrition/Amino Acids/Dextrose/ Fat Emulsion Intravenous

1,920 ml @  80 mls/hr TPN  CONT IV  Last administered on 6/22/20at 21:54;  Start

6/22/20 at 22:00;  Stop 6/23/20 at 21:59;  Status DC


Potassium Acetate 30 meq/Magnesium Sulfate 14 meq/ Multivitamins 10 ml/Chromium/

Copper/Manganese/ Seleni/Zn 1 ml/ Insulin Human Regular 15 unit/ Sodium Chloride

20 meq/Potassium Chloride 30 meq/ Total Parenteral Nutrition/Amino 

Acids/Dextrose/ Fat Emulsion Intravenous 1,920 ml @  80 mls/hr TPN  CONT IV  

Last administered on 6/23/20at 21:46;  Start 6/23/20 at 22:00;  Stop 6/24/20 at 

21:59;  Status DC


Sodium Chloride 80 meq/Potassium Chloride 30 meq/ Potassium Acetate 30 

meq/Magnesium Sulfate 14 meq/ Multivitamins 10 ml/Chromium/ Copper/Manganese/ 

Seleni/Zn 1 ml/ Insulin Human Regular 15 unit/ Total Parenteral Nutrition/Amino 

Acids/Dextrose/ Fat Emulsion Intravenous 1,920 ml @  80 mls/hr TPN  CONT IV  

Last administered on 6/24/20at 22:33;  Start 6/24/20 at 22:00;  Stop 6/25/20 at 

21:59;  Status DC


Furosemide (Lasix) 40 mg 1X  ONCE IVP  Last administered on 6/24/20at 16:27;  

Start 6/24/20 at 15:30;  Stop 6/24/20 at 15:33;  Status DC


Albumin Human 250 ml @  62.5 mls/hr 1X  ONCE IV  Last administered on 6/24/20at 

16:27;  Start 6/24/20 at 15:30;  Stop 6/24/20 at 19:29;  Status DC


Sodium Chloride 80 meq/Potassium Chloride 30 meq/ Potassium Acetate 30 

meq/Magnesium Sulfate 14 meq/ Multivitamins 10 ml/Chromium/ Copper/Manganese/ 

Seleni/Zn 1 ml/ Insulin Human Regular 15 unit/ Total Parenteral Nutrition/Amino 

Acids/Dextrose/ Fat Emulsion Intravenous 1,920 ml @  80 mls/hr TPN  CONT IV  

Last administered on 6/25/20at 22:25;  Start 6/25/20 at 22:00;  Stop 6/26/20 at 

21:59;  Status DC


Sodium Chloride 80 meq/Potassium Chloride 30 meq/ Potassium Acetate 30 

meq/Magnesium Sulfate 14 meq/ Multivitamins 10 ml/Chromium/ Copper/Manganese/ 

Seleni/Zn 1 ml/ Insulin Human Regular 15 unit/ Total Parenteral Nutrition/Amino 

Acids/Dextrose/ Fat Emulsion Intravenous 1,920 ml @  80 mls/hr TPN  CONT IV  

Last administered on 6/26/20at 21:32;  Start 6/26/20 at 22:00;  Stop 6/27/20 at 

21:59;  Status DC


Sodium Chloride 80 meq/Potassium Chloride 30 meq/ Potassium Acetate 30 

meq/Magnesium Sulfate 14 meq/ Multivitamins 10 ml/Chromium/ Copper/Manganese/ 

Seleni/Zn 1 ml/ Insulin Human Regular 15 unit/ Total Parenteral Nutrition/Amino 

Acids/Dextrose/ Fat Emulsion Intravenous 1,920 ml @  80 mls/hr TPN  CONT IV  

Last administered on 6/27/20at 21:53;  Start 6/27/20 at 22:00;  Stop 6/28/20 at 

21:59;  Status DC


Acetylcysteine (Mucomyst 20% Resp Treatment) 600 mg RTBID NEB  Last administered

on 6/28/20at 19:40;  Start 6/27/20 at 12:00


Sodium Chloride 80 meq/Potassium Chloride 30 meq/ Potassium Acetate 30 

meq/Magnesium Sulfate 14 meq/ Multivitamins 10 ml/Chromium/ Copper/Manganese/ 

Seleni/Zn 1 ml/ Insulin Human Regular 15 unit/ Total Parenteral Nutrition/Amino 

Acids/Dextrose/ Fat Emulsion Intravenous 1,920 ml @  80 mls/hr TPN  CONT IV  

Last administered on 6/28/20at 22:06;  Start 6/28/20 at 22:00;  Stop 6/29/20 at 

21:59


Meropenem 500 mg/ Sodium Chloride 50 ml @  100 mls/hr Q6HRS IV  Last 

administered on 6/29/20at 05:54;  Start 6/28/20 at 18:00


Daptomycin 500 mg/ Sodium Chloride 50 ml @  100 mls/hr Q24H IV  Last 

administered on 6/28/20at 20:03;  Start 6/28/20 at 19:00





Active Scripts


Active


Reported


Bisoprolol Fumarate 5 Mg Tablet 10 Mg PO DAILY


Vitals/I & O





Vital Sign - Last 24 Hours








 6/28/20 6/28/20 6/28/20 6/28/20





 10:00 11:00 11:58 12:00


 


Temp    98.9





    98.9


 


Pulse 108 106  109


 


Resp 32 28 26


 


B/P (MAP) 130/70 (90) 104/66 (79)  113/72 (86)


 


Pulse Ox 100 100  100


 


O2 Delivery Tracheal Collar Tracheal Collar Trach Collar Tracheal Collar


 


    





    





 6/28/20 6/28/20 6/28/20 6/28/20





 12:15 13:00 14:00 15:00


 


Pulse  109 112 118


 


Resp  24 26 26


 


B/P (MAP)  117/75 (89) 126/75 (92) 121/86 (98)


 


Pulse Ox 100 100 100 100


 


O2 Delivery Tracheal Collar Tracheal Collar Tracheal Collar Tracheal Collar


 


O2 Flow Rate 8.0   





 6/28/20 6/28/20 6/28/20 6/28/20





 16:00 16:00 16:10 17:00


 


Temp    101.3





    101.3


 


Pulse  122  127


 


Resp  24  26


 


B/P (MAP)  138/77 (97)  110/68 (82)


 


Pulse Ox  100 98 100


 


O2 Delivery Trach Collar Tracheal Collar Tracheal Collar Tracheal Collar


 


O2 Flow Rate   8.0 


 


    





    





 6/28/20 6/28/20 6/28/20 6/28/20





 18:00 19:00 19:40 20:00


 


Temp    101.4





    101.4


 


Pulse 128 132  134


 


Resp 28 30  31


 


B/P (MAP) 119/69 (86) 129/80 (96)  133/60 (84)


 


Pulse Ox 100 97 96 93


 


O2 Delivery Tracheal Collar Tracheal Collar Tracheal Collar Tracheal Collar


 


O2 Flow Rate   8.0 


 


    





    





 6/28/20 6/28/20 6/28/20 6/28/20





 20:00 21:00 22:00 23:00


 


Pulse  140 130 120


 


Resp  48 27 30


 


B/P (MAP)  133/81 (98) 122/58 (79) 120/59 (79)


 


Pulse Ox  95 95 92


 


O2 Delivery Trach Collar Tracheal Collar Tracheal Collar Tracheal Collar


 


O2 Flow Rate 8.0   





 6/29/20 6/29/20 6/29/20 6/29/20





 00:00 00:00 01:00 01:12


 


Temp 99.7   





 99.7   


 


Pulse 117  111 


 


Resp 29  37 


 


B/P (MAP) 107/62 (77)  86/64 (71) 


 


Pulse Ox 96  97 96


 


O2 Delivery Tracheal Collar Trach Collar Tracheal Collar Tracheal Collar


 


O2 Flow Rate  8.0  8.0


 


    





    





 6/29/20 6/29/20 6/29/20 6/29/20





 02:00 03:00 04:00 04:00


 


Temp   97.9 





   97.9 


 


Pulse 108 103 103 


 


Resp 37 19 21 


 


B/P (MAP) 102/60 (74) 102/64 (77) 109/65 (80) 


 


Pulse Ox 97 98 99 


 


O2 Delivery Tracheal Collar Tracheal Collar Tracheal Collar Trach Collar


 


O2 Flow Rate    8.0


 


    





    





 6/29/20 6/29/20 6/29/20 6/29/20





 04:15 05:00 06:00 07:00


 


Pulse  102 108 116


 


Resp  26 28 24


 


B/P (MAP)  114/72 (86) 119/89 (99) 120/77 (91)


 


Pulse Ox 100 98 98 98


 


O2 Delivery Tracheal Collar Tracheal Collar Tracheal Collar Tracheal Collar


 


O2 Flow Rate 8.0   





 6/29/20 6/29/20 6/29/20 





 08:00 08:00 08:33 


 


Temp  98.0  





  98.0  


 


Pulse  119  


 


Resp  24  


 


B/P (MAP)  113/79 (90)  


 


Pulse Ox  99 100 


 


O2 Delivery Trach Collar Tracheal Collar Tracheal Collar 


 


O2 Flow Rate   8.0 














Intake and Output   


 


 6/28/20 6/28/20 6/29/20





 14:59 22:59 06:59


 


Intake Total  1066 ml 1232 ml


 


Output Total 355 ml 715 ml 540 ml


 


Balance -355 ml 351 ml 692 ml











Hemodynamically unstable?:  No


Is patient in severe pain?:  No


Is NPO status required?:  No











OVIDIO SARAVIA MD          Jun 29, 2020 09:07

## 2020-06-29 NOTE — PDOC
Objective:


Objective:


D/w nursing - confusion, leakage around tubes/drains.


Tmax 101.4.


Vital Signs:





                                   Vital Signs








  Date Time  Temp Pulse Resp B/P (MAP) Pulse Ox O2 Delivery O2 Flow Rate FiO2


 


6/29/20 10:00  116 30 116/72 (87) 99 Tracheal Collar  


 


6/29/20 08:33       8.0 


 


6/29/20 08:00 98.0       





 98.0       








Labs:





Laboratory Tests








Test


 6/28/20


11:52 6/29/20


00:09 6/29/20


05:53 6/29/20


06:00


 


Glucose (Fingerstick) 128 mg/dL  123 mg/dL  125 mg/dL  


 


Sodium Level    137 mmol/L 


 


Potassium Level    4.4 mmol/L 


 


Chloride Level    101 mmol/L 


 


Carbon Dioxide Level    35 mmol/L 


 


Anion Gap    1 


 


Blood Urea Nitrogen    16 mg/dL 


 


Creatinine    0.7 mg/dL 


 


Estimated GFR


(Cockcroft-Gault) 


 


 


 88.9 





 


Glucose Level    129 mg/dL 


 


Calcium Level    10.7 mg/dL 








Imaging:


CXR 6/28


IMPRESSION:


No significant interval change compared to 6/25/2020.





PE:





GEN: chronically ill


LUNGS: trach collar


HEART: tachycardic on monitor


NEURO/PSYCH: sleeping, not awakened





A/P:


Gallstone pancreatitis





--


Plans for OR tomorrow for necrosectomy, cholecystectomy, feeding tube.





Justicifation of Admission Dx:


Justifications for Admission:


Justification of Admission Dx:  Yes











RAGHAVENDRA MURCIA         Jun 29, 2020 10:23

## 2020-06-29 NOTE — PDOC
Infectious Disease Note


Subjective:


Subjective


Pt drowsy


Pt febrile yesterday


T max 101.4


afebrile this am


Remains on trach collar





Vital Signs:


Vital Signs





Vital Signs








  Date Time  Temp Pulse Resp B/P (MAP) Pulse Ox O2 Delivery O2 Flow Rate FiO2


 


6/29/20 08:33     100 Tracheal Collar 8.0 


 


6/29/20 08:00 98.0 119 24 113/79 (90)    





 98.0       











Physical Exam:


PHYSICAL EXAM


GENERAL: Patient alert awake comfortable


HEENT: Anicteric, no thrush, NG tube in place


NECK:  Tracheostomy 


LUNGS: Diminished aeration bases, no accessory muscle use - CT on left 


HEART:  S1, S2,regular 


ABDOMEN: Mild distention, bowel sounds present, soft, grimaces to palpation, 

drains x 3


: [place] ( 6/7)


EXTREMITIES: + edema BLE


SKIN: no signs of gen rash 


LUE-PICC  without signs of complications





Medications:


Inpatient Meds:





Current Medications








 Medications


  (Trade)  Dose


 Ordered  Sig/Yee  Start Time


 Stop Time Status Last Admin


Dose Admin


 


 Acetaminophen


  (Tylenol Supp)  650 mg  PRN Q6HRS  PRN  3/24/20 10:30


    6/28/20 20:49


650 MG


 


 Acetaminophen


  (Tylenol)  650 mg  PRN Q6HRS  PRN  3/21/20 03:36


 5/13/20 10:25 DC 4/16/20 19:56


650 MG


 


 Acetylcysteine


  (Mucomyst 20%


 Resp Treatment)  600 mg  RTBID  6/27/20 12:00


    6/28/20 19:40


600 MG


 


 Albumin Human  250 ml @ 


 62.5 mls/hr  1X  ONCE  6/24/20 15:30


 6/24/20 19:29 DC 6/24/20 16:27


62.5 MLS/HR


 


 Albuterol Sulfate


  (Ventolin Neb


 Soln)  2.5 mg  1X  ONCE  3/17/20 22:30


 3/17/20 22:31 DC 3/18/20 00:56


2.5 MG


 


 Albuterol/


 Ipratropium


  (Duoneb)  3 ml  Q4HRS  6/13/20 08:00


    6/29/20 08:32


3 ML


 


 Alteplase,


 Recombinant


  (Cathflo For


 Central Catheter


 Clearance)  4 mg  1X  ONCE  6/17/20 09:15


 6/17/20 09:16 UNV  





 


 Alteplase,


 Recombinant 4 mg/


 Sodium Chloride  20 ml @ 20


 mls/hr  1X  ONCE  6/17/20 10:00


 6/17/20 10:59 DC 6/17/20 10:09


20 MLS/HR


 


 Amino Acids/


 Glycerin/


 Electrolytes  1,000 ml @ 


 75 mls/hr  [id number]  4/20/20 21:15


   UNV  





 


 Artificial Tears


  (Artificial


 Tears)  1 drop  PRN Q15MIN  PRN  4/29/20 05:30


    6/23/20 21:17


1 DROP


 


 Atenolol


  (Tenormin)  100 mg  DAILY  3/17/20 09:00


 3/16/20 20:08 DC  





 


 Atropine Sulfate


  (ATROPINE 0.5mg


 SYRINGE)  0.5 mg  PRN Q5MIN  PRN  4/2/20 08:15


     





 


 Barium Sulfate


  (Varibar Thin


 Liquid Apple)  148 gm  1X  ONCE  5/26/20 11:45


 5/26/20 11:49 DC  





 


 Benzocaine


  (Hurricaine One)  1 spray  1X  ONCE  3/20/20 14:30


 3/20/20 14:31 DC 3/20/20 16:38


1 SPRAY


 


 Bisacodyl


  (Dulcolax Supp)  10 mg  STK-MED ONCE  4/27/20 10:59


 4/27/20 10:59 DC  





 


 Bumetanide


  (Bumex)  2 mg  DAILY  5/8/20 10:00


 5/18/20 17:15 DC 5/18/20 08:07


2 MG


 


 Bupivacaine HCl/


 Epinephrine Bitart


  (Sensorcain-Epi


 0.5%-1:690837 Mpf)  30 ml  STK-MED ONCE  4/27/20 10:58


 4/27/20 10:58 DC 4/27/20 12:01


7 ML


 


 Calcium Carbonate/


 Glycine


  (Tums)  500 mg  PRN [id number]  PRN  3/18/20 17:45


 5/13/20 10:25 DC  





 


 Calcium Chloride


 1000 mg/Sodium


 Chloride  110 ml @ 


 220 mls/hr  1X  ONCE  3/17/20 22:30


 3/17/20 22:59 DC 3/17/20 22:11


220 MLS/HR


 


 Calcium Chloride


 3000 mg/Sodium


 Chloride  1,030 ml @ 


 50 mls/hr  [id number]  3/19/20 08:00


 3/21/20 15:23 DC 3/21/20 02:17


50 MLS/HR


 


 Calcium Gluconate


  (Calcium


 Gluconate)  2,000 mg  1X  ONCE  3/19/20 02:15


 3/19/20 02:16 DC 3/19/20 02:19


2,000 MG


 


 Calcium Gluconate


 1000 mg/Sodium


 Chloride  110 ml @ 


 220 mls/hr  1X  ONCE  3/18/20 03:30


 3/18/20 03:59 DC 3/18/20 03:21


220 MLS/HR


 


 Calcium Gluconate


 2000 mg/Sodium


 Chloride  120 ml @ 


 220 mls/hr  1X  ONCE  3/18/20 07:30


 3/18/20 08:02 DC 3/18/20 09:05


220 MLS/HR


 


 Cefepime HCl


  (Maxipime)  2 gm  Q12HR  3/25/20 09:00


 4/8/20 09:58 DC 4/7/20 20:56


2 GM


 


 Cellulose


  (Surgicel


 Fibrillar 1x2)  1 each  STK-MED ONCE  4/6/20 11:00


 4/6/20 11:01 DC  





 


 Cellulose


  (Surgicel


 Hemostat 2x14)  1 each  STK-MED ONCE  4/27/20 10:58


 4/27/20 10:59 DC  





 


 Cellulose


  (Surgicel


 Hemostat 4x8)  1 each  STK-MED ONCE  4/27/20 10:58


 4/27/20 10:59 DC  





 


 Chlorhexidine


 Gluconate


  (Peridex)  15 ml  BID  6/13/20 09:00


 6/13/20 07:58 DC  





 


 Ciprofloxacin/


 Dextrose  200 ml @ 


 200 mls/hr  Q12HR  6/18/20 21:00


 6/25/20 08:56 DC 6/25/20 08:27


200 MLS/HR


 


 Cyclobenzaprine


 HCl


  (Flexeril)  10 mg  PRN Q6HRS  PRN  4/30/20 10:45


     





 


 Daptomycin 410 mg/


 Sodium Chloride  50 ml @ 


 100 mls/hr  Q24H  6/7/20 14:00


 6/10/20 08:30 DC 6/9/20 13:33


100 MLS/HR


 


 Daptomycin 430 mg/


 Sodium Chloride  50 ml @ 


 100 mls/hr  Q24H  4/25/20 13:00


 4/30/20 20:58 DC 4/30/20 13:00


100 MLS/HR


 


 Daptomycin 450 mg/


 Sodium Chloride  50 ml @ 


 100 mls/hr  Q24H  5/17/20 09:00


 5/21/20 08:30 DC 5/20/20 09:25


100 MLS/HR


 


 Daptomycin 485 mg/


 Sodium Chloride  50 ml @ 


 100 mls/hr  Q24H  5/4/20 11:00


 5/12/20 07:44 DC 5/11/20 13:10


100 MLS/HR


 


 Daptomycin 500 mg/


 Sodium Chloride  50 ml @ 


 100 mls/hr  Q24H  6/28/20 19:00


    6/28/20 20:03


100 MLS/HR


 


 Dexamethasone


 Sodium Phosphate


  (Decadron)  4 mg  STK-MED ONCE  4/27/20 10:56


 4/27/20 10:57 DC  





 


 Dexmedetomidine


 HCl 400 mcg/


 Sodium Chloride  100 ml @ 0


 mls/hr  CONT  PRN  4/2/20 08:15


 5/30/20 18:31 DC 5/30/20 12:57


8 MLS/HR


 


 Dextrose


  (Dextrose


 50%-Water Syringe)  12.5 gm  PRN Q15MIN  PRN  3/16/20 09:30


     





 


 Digoxin


  (Lanoxin)  125 mcg  1X  ONCE  3/19/20 18:00


 3/19/20 18:01 DC 3/19/20 17:10


125 MCG


 


 Diphenhydramine


 HCl


  (Benadryl)  25 mg  1X PRN  PRN  4/24/20 15:45


 4/25/20 15:44 DC  





 


 Duloxetine HCl


  (Cymbalta)  30 mg  DAILY  5/10/20 14:00


 5/13/20 10:25 DC 5/11/20 09:48


30 MG


 


 Enoxaparin Sodium


  (Lovenox 100mg


 Syringe)  100 mg  Q12HR  4/21/20 21:00


   UNV  





 


 Enoxaparin Sodium


  (Lovenox 40mg


 Syringe)  40 mg  Q24H  5/17/20 17:00


 6/7/20 06:50 DC 6/5/20 17:44


40 MG


 


 Etomidate


  (Amidate)  8 mg  1X  ONCE  3/23/20 08:30


 3/23/20 08:31 DC 3/23/20 08:33


8 MG


 


 Fentanyl


  (Duragesic 50mcg/


 Hr Patch)  1 patch  Q72H  6/4/20 21:00


 6/13/20 12:00 DC 6/4/20 21:22


1 PATCH


 


 Fentanyl Citrate


  (Fentanyl 2ml


 Vial)  50 mcg  PRN Q1HR  PRN  6/13/20 06:00


     





 


 Fentanyl Citrate


  (Fentanyl 5ml


 Vial)  250 mcg  1X  ONCE  5/8/20 09:15


 5/8/20 09:16 DC 5/8/20 09:30


50 MCG


 


 Flumazenil


  (Romazicon)  0.5 mg  STK-MED ONCE  6/7/20 14:48


 6/7/20 14:48 DC  





 


 Fluoxetine HCl


  (PROzac)  20 mg  QHS  6/4/20 21:00


    6/27/20 21:52


20 MG


 


 Furosemide


  (Lasix)  40 mg  1X  ONCE  6/24/20 15:30


 6/24/20 15:33 DC 6/24/20 16:27


40 MG


 


 Haloperidol


 Lactate


  (Haldol Inj)  3 mg  1X  ONCE  5/4/20 14:30


 5/4/20 14:31 DC 5/4/20 14:37


3 MG


 


 Heparin Sodium


  (Porcine)


  (Hep Lock Adult)  500 unit  STK-MED ONCE  4/7/20 09:29


 4/7/20 09:30 DC  





 


 Heparin Sodium


  (Porcine)


  (Heparin Sodium)  5,000 unit  Q12HR  4/27/20 21:00


 5/7/20 09:59 DC 5/6/20 20:57


5,000 UNIT


 


 Heparin Sodium


  (Porcine) 1000


 unit/Sodium


 Chloride  1,001 ml @ 


 1,001 mls/hr  1X  ONCE  4/27/20 12:00


 4/27/20 12:59 DC  





 


 Hydromorphone HCl


  (Dilaudid


 Standard PCA)  12 mg  STK-MED ONCE  5/1/20 15:50


 5/12/20 11:24 DC  





 


 Hydromorphone HCl


  (Dilaudid)  1 mg  PRN Q4HRS  PRN  5/4/20 19:00


 5/18/20 17:10 DC 5/18/20 06:25


1 MG


 


 Info


  (CONTRAST GIVEN


 -- Rx MONITORING)  1 each  PRN DAILY  PRN  3/30/20 11:45


 4/1/20 11:44 DC  





 


 Info


  (Icu Electrolyte


 Protocol)  1 ea  CONT PRN  PRN  3/29/20 13:15


     





 


 Info


  (PHARMACY


 MONITORING -- do


 not chart)  1 each  PRN DAILY  PRN  4/24/20 15:45


 5/26/20 14:14 DC  





 


 Info


  (Tpn Per


 Pharmacy)  1 each  PRN DAILY  PRN  3/18/20 12:30


   UNV  





 


 Insulin Human


 Lispro


  (HumaLOG)  0-9 UNITS  Q6HRS  3/16/20 09:30


    6/24/20 18:05


4 UNITS


 


 Insulin Human


 Regular


  (HumuLIN R VIAL)  5 unit  1X  ONCE  3/17/20 22:30


 3/17/20 22:31 DC 3/17/20 22:14


5 UNIT


 


 Iohexol


  (Omnipaque 240


 Mg/ml)  30 ml  1X  ONCE  3/30/20 11:30


 3/30/20 11:33 DC 3/30/20 11:30


30 ML


 


 Iohexol


  (Omnipaque 300


 Mg/ml)  50 ml  STK-MED ONCE  4/27/20 10:58


 4/27/20 10:59 DC  





 


 Iohexol


  (Omnipaque 350


 Mg/ml)  90 ml  1X  ONCE  3/16/20 03:30


 3/16/20 03:31 DC 3/16/20 03:25


90 ML


 


 Ketorolac


 Tromethamine


  (Toradol 30mg


 Vial)  30 mg  1X  ONCE  3/16/20 03:00


 3/16/20 03:01 DC 3/16/20 02:54


30 MG


 


 Lidocaine HCl


  (Buffered


 Lidocaine 1%)  3 ml  1X  ONCE  6/15/20 13:00


 6/15/20 13:01 DC 6/15/20 13:11


12 ML


 


 Lidocaine HCl


  (Glydo


  (Lidocaine) Jelly)  1 thomas  1X  ONCE  3/20/20 14:30


 3/20/20 14:31 DC 3/20/20 16:38


1 THOMAS


 


 Lidocaine HCl


  (Lidocaine 1%


 20ml Vial)  20 ml  1X  ONCE  6/7/20 15:00


 6/7/20 15:01 DC 6/7/20 15:30


20 ML


 


 Lidocaine HCl


  (Xylocaine-Mpf


 1% 2ml Vial)  2 ml  PRN 1X  PRN  4/27/20 07:00


 4/28/20 06:59 DC  





 


 Linezolid/Dextrose  300 ml @ 


 300 mls/hr  Q12HR  5/17/20 09:00


 5/20/20 08:11 DC 5/19/20 21:08


300 MLS/HR


 


 Lorazepam


  (Ativan Inj)  0.25 mg  PRN Q4HRS  PRN  6/3/20 07:30


    6/27/20 13:37


0.25 MG


 


 Magnesium Sulfate  50 ml @ 25


 mls/hr  1X  ONCE  6/16/20 08:15


 6/16/20 10:14 DC  





 


 Meropenem 1 gm/


 Sodium Chloride  100 ml @ 


 200 mls/hr  Q12HR  6/7/20 21:00


 6/25/20 08:56 DC 6/25/20 08:27


200 MLS/HR


 


 Meropenem 500 mg/


 Sodium Chloride  50 ml @ 


 100 mls/hr  Q6HRS  6/28/20 18:00


    6/29/20 05:54


100 MLS/HR


 


 Methylprednisolone


 Sodium Succinate


  (SOLU-Medrol


 125MG VIAL)  125 mg  1X  ONCE  6/13/20 06:15


 6/13/20 06:16 DC 6/13/20 06:26


125 MG


 


 Metoclopramide HCl


  (Reglan Vial)  10 mg  PRN Q3HRS  PRN  5/9/20 16:45


    5/14/20 04:25


10 MG


 


 Metoprolol


 Tartrate


  (Lopressor Vial)  5 mg  PRN Q6HRS  PRN  6/10/20 09:00


    6/18/20 10:14


5 MG


 


 Metronidazole  100 ml @ 


 100 mls/hr  Q8HRS  4/14/20 10:00


 4/21/20 08:10 DC 4/21/20 06:04


100 MLS/HR


 


 Micafungin Sodium


 100 mg/Dextrose  100 ml @ 


 100 mls/hr  Q24H  6/7/20 11:00


 6/25/20 08:56 DC 6/24/20 12:34


100 MLS/HR


 


 Midazolam HCl


  (Versed)  2 mg  1X  ONCE  6/7/20 15:00


 6/7/20 15:01 DC 6/7/20 15:28


1 MG


 


 Midazolam HCl 100


 mg/Sodium Chloride  100 ml @ 7


 mls/hr  CONT  PRN  3/28/20 16:00


 6/3/20 14:38 DC 4/8/20 15:35


7 MLS/HR


 


 Midazolam HCl 50


 mg/Sodium Chloride  50 ml @ 0


 mls/hr  CONT  PRN  3/23/20 08:15


 3/28/20 15:59 DC 3/26/20 22:39


7 MLS/HR


 


 Morphine Sulfate


  (Morphine


 Sulfate)  2 mg  PRN Q2HR  PRN  3/16/20 05:00


 3/17/20 14:15 DC 3/17/20 12:26


2 MG


 


 Multi-Ingred


 Cream/Lotion/Oil/


 Oint


  (Artificial


 Tears Eye


 Ointment)  1 thomas  PRN Q1HR  PRN  3/25/20 17:30


 6/3/20 14:39 DC 4/13/20 08:19


1 THOMAS


 


 Naloxone HCl


  (Narcan)  0.4 mg  STK-MED ONCE  6/7/20 14:48


 6/7/20 14:48 DC  





 


 Norepinephrine


 Bitartrate 8 mg/


 Dextrose  258 ml @ 


 13.332 mls/


 hr  CONT  PRN  6/7/20 06:30


    6/7/20 21:46


13.332 MLS/HR


 


 Ondansetron HCl


  (Zofran)  4 mg  STK-MED ONCE  4/27/20 10:56


 4/27/20 10:57 DC  





 


 Pantoprazole


 Sodium


  (PROTONIX VIAL


 for IV PUSH)  40 mg  DAILYAC  3/16/20 11:30


    6/28/20 08:23


40 MG


 


 Phenylephrine HCl


  (PHENYLEPHRINE


 in 0.9% NACL PF)  1 mg  STK-MED ONCE  4/27/20 12:34


 4/27/20 12:34 DC  





 


 Piperacillin Sod/


 Tazobactam Sod


 3.375 gm/Sodium


 Chloride  50 ml @ 


 100 mls/hr  Q6HRS  5/27/20 12:00


 6/4/20 07:26 DC 6/4/20 06:10


100 MLS/HR


 


 Piperacillin Sod/


 Tazobactam Sod


 4.5 gm/Sodium


 Chloride  100 ml @ 


 200 mls/hr  1X  ONCE  3/16/20 06:00


 3/16/20 06:29 DC 3/16/20 05:44


200 MLS/HR


 


 Potassium


 Chloride 110 meq/


 Magnesium Sulfate


 20 meq/


 Multivitamins 10


 ml/Chromium/


 Copper/Manganese/


 Seleni/Zn 1 ml/


 Insulin Human


 Regular 15 unit/


 Total Parenteral


 Nutrition/Amino


 Acids/Dextrose/


 Fat Emulsion


 Intravenous  1,800 ml @ 


 75 mls/hr  TPN  CONT  5/24/20 22:00


 5/25/20 21:59 DC 5/24/20 22:48


75 MLS/HR


 


 Potassium


 Chloride 15 meq/


 Bicarbonate


 Dialysis Soln w/


 out KCl  5,007.5 ml


  @ 1,000 mls/


 hr  Q5H1M  3/29/20 20:00


 4/2/20 13:08 DC 4/1/20 18:14


1,000 MLS/HR


 


 Potassium


 Chloride 20 meq/


 Bicarbonate


 Dialysis Soln w/


 out KCl  5,010 ml @ 


 1,000 mls/hr  Q5H1M  3/25/20 16:00


 3/29/20 19:59 DC 3/29/20 14:54


1,000 MLS/HR


 


 Potassium


 Chloride 40 meq/


 Potassium Acetate


 60 meq/Magnesium


 Sulfate 10 meq/


 Multivitamins 10


 ml/Chromium/


 Copper/Manganese/


 Seleni/Zn 1 ml/


 Insulin Human


 Regular 20 unit/


 Total Parenteral


 Nutrition/Amino


 Acids/Dextrose/


 Fat Emulsion


 Intravenous  1,800 ml @ 


 75 mls/hr  TPN  CONT  6/4/20 22:00


 6/5/20 21:59 DC 6/5/20 00:03


75 MLS/HR


 


 Potassium


 Chloride 70 meq/


 Magnesium Sulfate


 20 meq/


 Multivitamins 10


 ml/Chromium/


 Copper/Manganese/


 Seleni/Zn 1 ml/


 Insulin Human


 Regular 15 unit/


 Total Parenteral


 Nutrition/Amino


 Acids/Dextrose/


 Fat Emulsion


 Intravenous  1,800 ml @ 


 75 mls/hr  TPN  CONT  5/29/20 22:00


 5/30/20 21:59 DC 5/29/20 23:13


75 MLS/HR


 


 Potassium


 Chloride 75 meq/


 Magnesium Sulfate


 15 meq/


 Multivitamins 10


 ml/Chromium/


 Copper/Manganese/


 Seleni/Zn 0.5 ml/


 Insulin Human


 Regular 15 unit/


 Total Parenteral


 Nutrition/Amino


 Acids/Dextrose/


 Fat Emulsion


 Intravenous  1,920 ml @ 


 80 mls/hr  TPN  CONT  5/9/20 22:00


 5/10/20 21:59 DC 5/9/20 22:41


80 MLS/HR


 


 Potassium


 Chloride 75 meq/


 Magnesium Sulfate


 15 meq/Calcium


 Gluconate 8 meq/


 Multivitamins 10


 ml/Chromium/


 Copper/Manganese/


 Seleni/Zn 0.5 ml/


 Insulin Human


 Regular 15 unit/


 Total Parenteral


 Nutrition/Amino


 Acids/Dextrose/


 Fat Emulsion


 Intravenous  1,920 ml @ 


 80 mls/hr  TPN  CONT  5/7/20 22:00


 5/8/20 21:59 DC 5/7/20 22:28


80 MLS/HR


 


 Potassium


 Chloride 75 meq/


 Magnesium Sulfate


 15 meq/Calcium


 Gluconate 8 meq/


 Multivitamins 10


 ml/Chromium/


 Copper/Manganese/


 Seleni/Zn 0.5 ml/


 Insulin Human


 Regular 20 unit/


 Total Parenteral


 Nutrition/Amino


 Acids/Dextrose/


 Fat Emulsion


 Intravenous  1,920 ml @ 


 80 mls/hr  TPN  CONT  5/6/20 22:00


 5/7/20 21:59 DC 5/6/20 22:00


80 MLS/HR


 


 Potassium


 Chloride 75 meq/


 Magnesium Sulfate


 15 meq/Calcium


 Gluconate 8 meq/


 Multivitamins 10


 ml/Chromium/


 Copper/Manganese/


 Seleni/Zn 0.5 ml/


 Insulin Human


 Regular 25 unit/


 Total Parenteral


 Nutrition/Amino


 Acids/Dextrose/


 Fat Emulsion


 Intravenous  1,920 ml @ 


 80 mls/hr  TPN  CONT  5/4/20 22:00


 5/5/20 21:59 DC 5/4/20 23:08


80 MLS/HR


 


 Potassium


 Chloride 75 meq/


 Magnesium Sulfate


 20 meq/Calcium


 Gluconate 10 meq/


 Multivitamins 10


 ml/Chromium/


 Copper/Manganese/


 Seleni/Zn 0.5 ml/


 Insulin Human


 Regular 25 unit/


 Total Parenteral


 Nutrition/Amino


 Acids/Dextrose/


 Fat Emulsion


 Intravenous  1,920 ml @ 


 80 mls/hr  TPN  CONT  5/3/20 22:00


 5/4/20 21:59 DC 5/3/20 22:04


80 MLS/HR


 


 Potassium


 Chloride 75 meq/


 Magnesium Sulfate


 20 meq/Calcium


 Gluconate 10 meq/


 Multivitamins 10


 ml/Chromium/


 Copper/Manganese/


 Seleni/Zn 0.5 ml/


 Insulin Human


 Regular 30 unit/


 Total Parenteral


 Nutrition/Amino


 Acids/Dextrose/


 Fat Emulsion


 Intravenous  1,920 ml @ 


 80 mls/hr  TPN  CONT  5/2/20 22:00


 5/3/20 22:00 DC 5/2/20 21:51


80 MLS/HR


 


 Potassium


 Chloride 80 meq/


 Magnesium Sulfate


 20 meq/


 Multivitamins 10


 ml/Chromium/


 Copper/Manganese/


 Seleni/Zn 0.5 ml/


 Insulin Human


 Regular 15 unit/


 Total Parenteral


 Nutrition/Amino


 Acids/Dextrose/


 Fat Emulsion


 Intravenous  1,920 ml @ 


 80 mls/hr  TPN  CONT  5/12/20 22:00


 5/13/20 21:59 DC 5/12/20 21:40


80 MLS/HR


 


 Potassium


 Chloride 80 meq/


 Magnesium Sulfate


 20 meq/


 Multivitamins 10


 ml/Chromium/


 Copper/Manganese/


 Seleni/Zn 1 ml/


 Insulin Human


 Regular 15 unit/


 Total Parenteral


 Nutrition/Amino


 Acids/Dextrose/


 Fat Emulsion


 Intravenous  1,800 ml @ 


 75 mls/hr  TPN  CONT  5/31/20 22:00


 6/1/20 21:59 DC 5/31/20 21:54


75 MLS/HR


 


 Potassium


 Chloride 90 meq/


 Magnesium Sulfate


 20 meq/


 Multivitamins 10


 ml/Chromium/


 Copper/Manganese/


 Seleni/Zn 1 ml/


 Insulin Human


 Regular 15 unit/


 Total Parenteral


 Nutrition/Amino


 Acids/Dextrose/


 Fat Emulsion


 Intravenous  1,800 ml @ 


 75 mls/hr  TPN  CONT  5/20/20 22:00


 5/21/20 21:59 DC 5/20/20 22:28


75 MLS/HR


 


 Potassium


 Chloride 90 meq/


 Magnesium Sulfate


 20 meq/


 Multivitamins 10


 ml/Chromium/


 Copper/Manganese/


 Seleni/Zn 1 ml/


 Insulin Human


 Regular 20 unit/


 Total Parenteral


 Nutrition/Amino


 Acids/Dextrose/


 Fat Emulsion


 Intravenous  1,800 ml @ 


 75 mls/hr  TPN  CONT  6/3/20 22:00


 6/4/20 21:59 DC 6/3/20 23:13


75 MLS/HR


 


 Potassium


 Chloride/Water  100 ml @ 


 100 mls/hr  Q1H  6/17/20 08:00


 6/17/20 09:59 DC 6/17/20 09:12


100 MLS/HR


 


 Potassium


 Phosphate 20 mmol/


 Sodium Chloride  106.6667


 ml @ 


 51.667 m...  1X  ONCE  3/25/20 13:00


 3/25/20 15:03 DC 3/25/20 12:51


51.667 MLS/HR


 


 Potassium Acetate


 30 meq/Magnesium


 Sulfate 14 meq/


 Multivitamins 10


 ml/Chromium/


 Copper/Manganese/


 Seleni/Zn 1 ml/


 Insulin Human


 Regular 15 unit/


 Sodium Chloride


 20 meq/Potassium


 Chloride 30 meq/


 Total Parenteral


 Nutrition/Amino


 Acids/Dextrose/


 Fat Emulsion


 Intravenous  1,920 ml @ 


 80 mls/hr  TPN  CONT  6/23/20 22:00


 6/24/20 21:59 DC 6/23/20 21:46


80 MLS/HR


 


 Potassium Acetate


 30 meq/Magnesium


 Sulfate 20 meq/


 Calcium Gluconate


 10 meq/


 Multivitamins 10


 ml/Chromium/


 Copper/Manganese/


 Seleni/Zn 0.5 ml/


 Insulin Human


 Regular 30 unit/


 Potassium


 Chloride 30 meq/


 Total Parenteral


 Nutrition/Amino


 Acids/Dextrose/


 Fat Emulsion


 Intravenous  1,920 ml @ 


 80 mls/hr  TPN  CONT  5/1/20 22:00


 5/2/20 21:59 DC 5/1/20 22:34


80 MLS/HR


 


 Potassium Acetate


 40 meq/Magnesium


 Sulfate 10 meq/


 Multivitamins 10


 ml/Chromium/


 Copper/Manganese/


 Seleni/Zn 1 ml/


 Insulin Human


 Regular 20 unit/


 Total Parenteral


 Nutrition/Amino


 Acids/Dextrose/


 Fat Emulsion


 Intravenous  1,920 ml @ 


 80 mls/hr  TPN  CONT  6/16/20 22:00


 6/17/20 21:59 DC 6/16/20 21:32


80 MLS/HR


 


 Potassium Acetate


 40 meq/Magnesium


 Sulfate 5 meq/


 Multivitamins 10


 ml/Chromium/


 Copper/Manganese/


 Seleni/Zn 1 ml/


 Insulin Human


 Regular 30 unit/


 Total Parenteral


 Nutrition/Amino


 Acids/Dextrose/


 Fat Emulsion


 Intravenous  1,920 ml @ 


 80 mls/hr  TPN  CONT  6/15/20 22:00


 6/16/20 19:34 DC 6/15/20 21:54


80 MLS/HR


 


 Potassium Acetate


 55 meq/Magnesium


 Sulfate 20 meq/


 Calcium Gluconate


 10 meq/


 Multivitamins 10


 ml/Chromium/


 Copper/Manganese/


 Seleni/Zn 0.5 ml/


 Insulin Human


 Regular 30 unit/


 Total Parenteral


 Nutrition/Amino


 Acids/Dextrose/


 Fat Emulsion


 Intravenous  1,920 ml @ 


 80 mls/hr  TPN  CONT  4/30/20 22:00


 5/1/20 21:59 DC 5/1/20 01:00


80 MLS/HR


 


 Potassium Acetate


 55 meq/Magnesium


 Sulfate 20 meq/


 Calcium Gluconate


 10 meq/


 Multivitamins 10


 ml/Chromium/


 Copper/Manganese/


 Seleni/Zn 0.5 ml/


 Insulin Human


 Regular 35 unit/


 Total Parenteral


 Nutrition/Amino


 Acids/Dextrose/


 Fat Emulsion


 Intravenous  1,920 ml @ 


 80 mls/hr  TPN  CONT  4/28/20 22:00


 4/29/20 21:59 DC 4/28/20 22:02


80 MLS/HR


 


 Potassium Acetate


 60 meq/Magnesium


 Sulfate 10 meq/


 Multivitamins 10


 ml/Chromium/


 Copper/Manganese/


 Seleni/Zn 1 ml/


 Insulin Human


 Regular 20 unit/


 Total Parenteral


 Nutrition/Amino


 Acids/Dextrose/


 Fat Emulsion


 Intravenous  1,920 ml @ 


 80 mls/hr  TPN  CONT  6/17/20 22:00


 6/18/20 21:59 DC 6/17/20 21:55


80 MLS/HR


 


 Potassium Acetate


 60 meq/Magnesium


 Sulfate 14 meq/


 Multivitamins 10


 ml/Chromium/


 Copper/Manganese/


 Seleni/Zn 1 ml/


 Insulin Human


 Regular 15 unit/


 Sodium Chloride


 20 meq/Total


 Parenteral


 Nutrition/Amino


 Acids/Dextrose/


 Fat Emulsion


 Intravenous  1,920 ml @ 


 80 mls/hr  TPN  CONT  6/22/20 22:00


 6/23/20 21:59 DC 6/22/20 21:54


80 MLS/HR


 


 Potassium Acetate


 60 meq/Magnesium


 Sulfate 14 meq/


 Multivitamins 10


 ml/Chromium/


 Copper/Manganese/


 Seleni/Zn 1 ml/


 Insulin Human


 Regular 15 unit/


 Total Parenteral


 Nutrition/Amino


 Acids/Dextrose/


 Fat Emulsion


 Intravenous  1,920 ml @ 


 80 mls/hr  TPN  CONT  6/21/20 22:00


 6/22/20 21:59 DC 6/21/20 22:22


80 MLS/HR


 


 Potassium Acetate


 60 meq/Magnesium


 Sulfate 14 meq/


 Multivitamins 10


 ml/Chromium/


 Copper/Manganese/


 Seleni/Zn 1 ml/


 Insulin Human


 Regular 20 unit/


 Total Parenteral


 Nutrition/Amino


 Acids/Dextrose/


 Fat Emulsion


 Intravenous  1,920 ml @ 


 80 mls/hr  TPN  CONT  6/18/20 22:00


 6/19/20 21:59 DC 6/18/20 22:26


80 MLS/HR


 


 Potassium Acetate


 60 meq/Magnesium


 Sulfate 5 meq/


 Multivitamins 10


 ml/Chromium/


 Copper/Manganese/


 Seleni/Zn 1 ml/


 Insulin Human


 Regular 30 unit/


 Total Parenteral


 Nutrition/Amino


 Acids/Dextrose/


 Fat Emulsion


 Intravenous  1,920 ml @ 


 80 mls/hr  TPN  CONT  6/6/20 22:00


 6/7/20 21:59 DC 6/6/20 21:54


80 MLS/HR


 


 Potassium Acetate


 65 meq/Magnesium


 Sulfate 20 meq/


 Calcium Gluconate


 10 meq/


 Multivitamins 10


 ml/Chromium/


 Copper/Manganese/


 Seleni/Zn 0.5 ml/


 Insulin Human


 Regular 30 unit/


 Total Parenteral


 Nutrition/Amino


 Acids/Dextrose/


 Fat Emulsion


 Intravenous  1,920 ml @ 


 80 mls/hr  TPN  CONT  4/29/20 22:00


 4/30/20 21:59 DC 4/29/20 22:22


80 MLS/HR


 


 Potassium Acetate


 80 meq/Magnesium


 Sulfate 5 meq/


 Multivitamins 10


 ml/Chromium/


 Copper/Manganese/


 Seleni/Zn 1 ml/


 Insulin Human


 Regular 20 unit/


 Total Parenteral


 Nutrition/Amino


 Acids/Dextrose/


 Fat Emulsion


 Intravenous  1,920 ml @ 


 80 mls/hr  TPN  CONT  6/5/20 22:00


 6/6/20 21:59 DC 6/5/20 21:59


80 MLS/HR


 


 Prochlorperazine


 Edisylate


  (Compazine)  5 mg  PACU PRN  PRN  4/27/20 07:00


 4/28/20 06:59 DC  





 


 Propofol  100 ml @ 0


 mls/hr  CONT  PRN  6/13/20 06:00


    6/20/20 23:50


2.7 MLS/HR


 


 Ringer's Solution  1,000 ml @ 


 30 mls/hr  Q24H  4/27/20 07:00


 4/27/20 18:59 DC  





 


 Rocuronium Bromide


  (Zemuron)  50 mg  STK-MED ONCE  4/27/20 10:56


 4/27/20 10:57 DC  





 


 Saliva Substitute


  (Biotene


 Moisturizing


 Mouth)  2 spray  PRN Q15MIN  PRN  5/21/20 11:00


     





 


 Sevoflurane


  (Ultane)  60 ml  STK-MED ONCE  4/27/20 12:26


 4/27/20 12:27 DC  





 


 Sodium


 Bicarbonate 50


 meq/Sodium


 Chloride  1,050 ml @ 


 75 mls/hr  Q14H  3/18/20 07:30


 3/23/20 10:28 DC 3/22/20 21:10


75 MLS/HR


 


 Sodium Acetate 50


 meq/Potassium


 Acetate 55 meq/


 Magnesium Sulfate


 20 meq/Calcium


 Gluconate 10 meq/


 Multivitamins 10


 ml/Chromium/


 Copper/Manganese/


 Seleni/Zn 0.5 ml/


 Insulin Human


 Regular 35 unit/


 Total Parenteral


 Nutrition/Amino


 Acids/Dextrose/


 Fat Emulsion


 Intravenous  1,800 ml @ 


 75 mls/hr  TPN  CONT  4/25/20 22:00


 4/26/20 21:59 DC 4/25/20 22:03


75 MLS/HR


 


 Sodium Bicarbonate


  (Sodium Bicarb


 Adult 8.4% Syr)  50 meq  1X  ONCE  6/7/20 22:00


 6/7/20 22:01 DC 6/7/20 21:47


50 MEQ


 


 Sodium Chloride


  (Normal Saline


 Flush)  3 ml  QSHIFT  PRN  4/27/20 13:45


     





 


 Sodium Chloride


 80 meq/Potassium


 Chloride 30 meq/


 Potassium Acetate


 30 meq/Magnesium


 Sulfate 14 meq/


 Multivitamins 10


 ml/Chromium/


 Copper/Manganese/


 Seleni/Zn 1 ml/


 Insulin Human


 Regular 15 unit/


 Total Parenteral


 Nutrition/Amino


 Acids/Dextrose/


 Fat Emulsion


 Intravenous  1,920 ml @ 


 80 mls/hr  TPN  CONT  6/28/20 22:00


 6/29/20 21:59  6/28/20 22:06


80 MLS/HR


 


 Sodium Chloride


 90 meq/Calcium


 Gluconate 10 meq/


 Multivitamins 10


 ml/Chromium/


 Copper/Manganese/


 Seleni/Zn 0.5 ml/


 Total Parenteral


 Nutrition/Amino


 Acids/Dextrose/


 Fat Emulsion


 Intravenous  1,512 ml @ 


 63 mls/hr  TPN  CONT  3/18/20 22:00


 3/19/20 21:59 DC 3/18/20 22:06


63 MLS/HR


 


 Sodium Chloride


 90 meq/Calcium


 Gluconate 10 meq/


 Multivitamins 10


 ml/Chromium/


 Copper/Manganese/


 Seleni/Zn 1 ml/


 Total Parenteral


 Nutrition/Amino


 Acids/Dextrose/


 Fat Emulsion


 Intravenous  55.005 ml


  @ 2.292


 mls/hr  TPN  CONT  3/18/20 22:00


 3/18/20 12:33 DC  





 


 Sodium Chloride


 90 meq/Magnesium


 Sulfate 10 meq/


 Calcium Gluconate


 20 meq/


 Multivitamins 10


 ml/Chromium/


 Copper/Manganese/


 Seleni/Zn 0.5 ml/


 Total Parenteral


 Nutrition/Amino


 Acids/Dextrose/


 Fat Emulsion


 Intravenous  1,512 ml @ 


 63 mls/hr  TPN  CONT  3/19/20 22:00


 3/20/20 21:59 DC 3/19/20 22:25


63 MLS/HR


 


 Sodium Chloride


 90 meq/Magnesium


 Sulfate 12 meq/


 Calcium Gluconate


 15 meq/


 Multivitamins 10


 ml/Chromium/


 Copper/Manganese/


 Seleni/Zn 0.5 ml/


 Insulin Human


 Regular 25 unit/


 Total Parenteral


 Nutrition/Amino


 Acids/Dextrose/


 Fat Emulsion


 Intravenous  1,400 ml @ 


 58.333 mls/


 hr  TPN  CONT  4/8/20 22:00


 4/9/20 21:59 DC 4/8/20 21:41


58.333 MLS/HR


 


 Sodium Chloride


 90 meq/Potassium


 Chloride 15 meq/


 Magnesium Sulfate


 12 meq/Calcium


 Gluconate 15 meq/


 Multivitamins 10


 ml/Chromium/


 Copper/Manganese/


 Seleni/Zn 0.5 ml/


 Insulin Human


 Regular 25 unit/


 Total Parenteral


 Nutrition/Amino


 Acids/Dextrose/


 Fat Emulsion


 Intravenous  1,400 ml @ 


 58.333 mls/


 hr  TPN  CONT  4/7/20 22:00


 4/8/20 21:59 DC 4/7/20 22:13


58.333 MLS/HR


 


 Sodium Chloride


 90 meq/Potassium


 Chloride 15 meq/


 Potassium


 Phosphate 10 mmol/


 Magnesium Sulfate


 8 meq/Calcium


 Gluconate 15 meq/


 Multivitamins 10


 ml/Chromium/


 Copper/Manganese/


 Seleni/Zn 0.5 ml/


 Insulin Human


 Regular 25 unit/


 Total Parenteral


 Nutrition/Amino


 Acids/Dextrose/


 Fat Emulsion


 Intravenous  1,400 ml @ 


 58.333 mls/


 hr  TPN  CONT  4/5/20 22:00


 4/6/20 21:59 DC 4/5/20 21:20


58.333 MLS/HR


 


 Sodium Chloride


 90 meq/Potassium


 Chloride 15 meq/


 Potassium


 Phosphate 10 mmol/


 Magnesium Sulfate


 10 meq/Calcium


 Gluconate 20 meq/


 Multivitamins 10


 ml/Chromium/


 Copper/Manganese/


 Seleni/Zn 0.5 ml/


 Total Parenteral


 Nutrition/Amino


 Acids/Dextrose/


 Fat Emulsion


 Intravenous  1,400 ml @ 


 58.333 mls/


 hr  TPN  CONT  3/23/20 22:00


 3/24/20 21:59 DC 3/23/20 21:42


58.333 MLS/HR


 


 Sodium Chloride


 90 meq/Potassium


 Chloride 15 meq/


 Potassium


 Phosphate 10 mmol/


 Magnesium Sulfate


 12 meq/Calcium


 Gluconate 15 meq/


 Multivitamins 10


 ml/Chromium/


 Copper/Manganese/


 Seleni/Zn 0.5 ml/


 Insulin Human


 Regular 25 unit/


 Total Parenteral


 Nutrition/Amino


 Acids/Dextrose/


 Fat Emulsion


 Intravenous  1,400 ml @ 


 58.333 mls/


 hr  TPN  CONT  4/6/20 22:00


 4/7/20 21:59 DC 4/6/20 22:24


58.333 MLS/HR


 


 Sodium Chloride


 90 meq/Potassium


 Chloride 15 meq/


 Potassium


 Phosphate 15 mmol/


 Magnesium Sulfate


 10 meq/Calcium


 Gluconate 15 meq/


 Multivitamins 10


 ml/Chromium/


 Copper/Manganese/


 Seleni/Zn 0.5 ml/


 Total Parenteral


 Nutrition/Amino


 Acids/Dextrose/


 Fat Emulsion


 Intravenous  1,400 ml @ 


 58.333 mls/


 hr  TPN  CONT  3/24/20 22:00


 3/25/20 21:59 DC 3/24/20 22:17


58.333 MLS/HR


 


 Sodium Chloride


 90 meq/Potassium


 Chloride 15 meq/


 Potassium


 Phosphate 15 mmol/


 Magnesium Sulfate


 10 meq/Calcium


 Gluconate 20 meq/


 Multivitamins 10


 ml/Chromium/


 Copper/Manganese/


 Seleni/Zn 0.5 ml/


 Total Parenteral


 Nutrition/Amino


 Acids/Dextrose/


 Fat Emulsion


 Intravenous  1,200 ml @ 


 50 mls/hr  TPN  CONT  3/22/20 22:00


 3/22/20 14:17 DC  





 


 Sodium Chloride


 90 meq/Potassium


 Chloride 15 meq/


 Potassium


 Phosphate 18 mmol/


 Magnesium Sulfate


 8 meq/Calcium


 Gluconate 15 meq/


 Multivitamins 10


 ml/Chromium/


 Copper/Manganese/


 Seleni/Zn 0.5 ml/


 Insulin Human


 Regular 10 unit/


 Total Parenteral


 Nutrition/Amino


 Acids/Dextrose/


 Fat Emulsion


 Intravenous  1,400 ml @ 


 58.333 mls/


 hr  TPN  CONT  3/27/20 22:00


 3/28/20 21:59 DC 3/27/20 21:43


58.333 MLS/HR


 


 Sodium Chloride


 90 meq/Potassium


 Chloride 15 meq/


 Potassium


 Phosphate 18 mmol/


 Magnesium Sulfate


 8 meq/Calcium


 Gluconate 15 meq/


 Multivitamins 10


 ml/Chromium/


 Copper/Manganese/


 Seleni/Zn 0.5 ml/


 Insulin Human


 Regular 15 unit/


 Total Parenteral


 Nutrition/Amino


 Acids/Dextrose/


 Fat Emulsion


 Intravenous  1,400 ml @ 


 58.333 mls/


 hr  TPN  CONT  3/30/20 22:00


 3/31/20 21:59 DC 3/30/20 21:47


58.333 MLS/HR


 


 Sodium Chloride


 90 meq/Potassium


 Chloride 15 meq/


 Potassium


 Phosphate 18 mmol/


 Magnesium Sulfate


 8 meq/Calcium


 Gluconate 15 meq/


 Multivitamins 10


 ml/Chromium/


 Copper/Manganese/


 Seleni/Zn 0.5 ml/


 Insulin Human


 Regular 20 unit/


 Total Parenteral


 Nutrition/Amino


 Acids/Dextrose/


 Fat Emulsion


 Intravenous  1,400 ml @ 


 58.333 mls/


 hr  TPN  CONT  4/2/20 22:00


 4/3/20 21:59 DC 4/2/20 22:45


58.333 MLS/HR


 


 Sodium Chloride


 90 meq/Potassium


 Chloride 15 meq/


 Potassium


 Phosphate 18 mmol/


 Magnesium Sulfate


 8 meq/Calcium


 Gluconate 15 meq/


 Multivitamins 10


 ml/Chromium/


 Copper/Manganese/


 Seleni/Zn 0.5 ml/


 Total Parenteral


 Nutrition/Amino


 Acids/Dextrose/


 Fat Emulsion


 Intravenous  1,400 ml @ 


 58.333 mls/


 hr  TPN  CONT  3/26/20 22:00


 3/27/20 21:59 DC 3/26/20 22:00


58.333 MLS/HR


 


 Sodium Chloride


 90 meq/Potassium


 Phosphate 15 mmol/


 Magnesium Sulfate


 12 meq/Calcium


 Gluconate 15 meq/


 Multivitamins 10


 ml/Chromium/


 Copper/Manganese/


 Seleni/Zn 0.5 ml/


 Insulin Human


 Regular 30 unit/


 Total Parenteral


 Nutrition/Amino


 Acids/Dextrose/


 Fat Emulsion


 Intravenous  1,400 ml @ 


 58.333 mls/


 hr  TPN  CONT  4/10/20 22:00


 4/11/20 21:59 DC 4/10/20 21:49


58.333 MLS/HR


 


 Sodium Chloride


 90 meq/Potassium


 Phosphate 15 mmol/


 Magnesium Sulfate


 12 meq/Calcium


 Gluconate 15 meq/


 Multivitamins 10


 ml/Chromium/


 Copper/Manganese/


 Seleni/Zn 0.5 ml/


 Insulin Human


 Regular 40 unit/


 Total Parenteral


 Nutrition/Amino


 Acids/Dextrose/


 Fat Emulsion


 Intravenous  1,400 ml @ 


 58.333 mls/


 hr  TPN  CONT  4/11/20 22:00


 4/12/20 21:59 DC 4/11/20 21:21


58.333 MLS/HR


 


 Sodium Chloride


 90 meq/Potassium


 Phosphate 19 mmol/


 Magnesium Sulfate


 12 meq/Calcium


 Gluconate 15 meq/


 Multivitamins 10


 ml/Chromium/


 Copper/Manganese/


 Seleni/Zn 0.5 ml/


 Insulin Human


 Regular 40 unit/


 Total Parenteral


 Nutrition/Amino


 Acids/Dextrose/


 Fat Emulsion


 Intravenous  1,400 ml @ 


 58.333 mls/


 hr  TPN  CONT  4/12/20 22:00


 4/13/20 21:59 DC 4/12/20 21:54


58.333 MLS/HR


 


 Sodium Chloride


 90 meq/Potassium


 Phosphate 5 mmol/


 Magnesium Sulfate


 12 meq/Calcium


 Gluconate 15 meq/


 Multivitamins 10


 ml/Chromium/


 Copper/Manganese/


 Seleni/Zn 0.5 ml/


 Insulin Human


 Regular 30 unit/


 Total Parenteral


 Nutrition/Amino


 Acids/Dextrose/


 Fat Emulsion


 Intravenous  1,400 ml @ 


 58.333 mls/


 hr  TPN  CONT  4/9/20 22:00


 4/10/20 21:59 DC 4/9/20 22:08


58.333 MLS/HR


 


 Sodium Chloride


 100 meq/Potassium


 Chloride 40 meq/


 Magnesium Sulfate


 15 meq/Calcium


 Gluconate 15 meq/


 Multivitamins 10


 ml/Chromium/


 Copper/Manganese/


 Seleni/Zn 0.5 ml/


 Insulin Human


 Regular 35 unit/


 Total Parenteral


 Nutrition/Amino


 Acids/Dextrose/


 Fat Emulsion


 Intravenous  1,400 ml @ 


 58.333 mls/


 hr  TPN  CONT  4/19/20 22:00


 4/20/20 21:59 DC 4/19/20 22:46


58.333 MLS/HR


 


 Sodium Chloride


 100 meq/Potassium


 Chloride 40 meq/


 Magnesium Sulfate


 20 meq/Calcium


 Gluconate 10 meq/


 Multivitamins 10


 ml/Chromium/


 Copper/Manganese/


 Seleni/Zn 0.5 ml/


 Insulin Human


 Regular 35 unit/


 Total Parenteral


 Nutrition/Amino


 Acids/Dextrose/


 Fat Emulsion


 Intravenous  1,400 ml @ 


 58.333 mls/


 hr  TPN  CONT  4/23/20 22:00


 4/24/20 21:59 DC 4/24/20 00:06


58.333 MLS/HR


 


 Sodium Chloride


 100 meq/Potassium


 Chloride 40 meq/


 Magnesium Sulfate


 20 meq/Calcium


 Gluconate 15 meq/


 Multivitamins 10


 ml/Chromium/


 Copper/Manganese/


 Seleni/Zn 0.5 ml/


 Insulin Human


 Regular 35 unit/


 Total Parenteral


 Nutrition/Amino


 Acids/Dextrose/


 Fat Emulsion


 Intravenous  1,400 ml @ 


 58.333 mls/


 hr  TPN  CONT  4/22/20 22:00


 4/23/20 21:59 DC 4/22/20 22:27


58.333 MLS/HR


 


 Sodium Chloride


 100 meq/Potassium


 Phosphate 10 mmol/


 Magnesium Sulfate


 12 meq/Calcium


 Gluconate 15 meq/


 Multivitamins 10


 ml/Chromium/


 Copper/Manganese/


 Seleni/Zn 0.5 ml/


 Insulin Human


 Regular 35 unit/


 Potassium


 Chloride 20 meq/


 Total Parenteral


 Nutrition/Amino


 Acids/Dextrose/


 Fat Emulsion


 Intravenous  1,400 ml @ 


 58.333 mls/


 hr  TPN  CONT  4/16/20 22:00


 4/17/20 21:59 DC 4/16/20 22:10


58.333 MLS/HR


 


 Sodium Chloride


 100 meq/Potassium


 Phosphate 19 mmol/


 Magnesium Sulfate


 12 meq/Calcium


 Gluconate 15 meq/


 Multivitamins 10


 ml/Chromium/


 Copper/Manganese/


 Seleni/Zn 0.5 ml/


 Insulin Human


 Regular 40 unit/


 Potassium


 Chloride 20 meq/


 Total Parenteral


 Nutrition/Amino


 Acids/Dextrose/


 Fat Emulsion


 Intravenous  1,400 ml @ 


 58.333 mls/


 hr  TPN  CONT  4/15/20 22:00


 4/16/20 21:59 DC 4/15/20 21:20


58.333 MLS/HR


 


 Sodium Chloride


 100 meq/Potassium


 Phosphate 5 mmol/


 Magnesium Sulfate


 12 meq/Calcium


 Gluconate 15 meq/


 Multivitamins 10


 ml/Chromium/


 Copper/Manganese/


 Seleni/Zn 0.5 ml/


 Insulin Human


 Regular 35 unit/


 Potassium


 Chloride 20 meq/


 Total Parenteral


 Nutrition/Amino


 Acids/Dextrose/


 Fat Emulsion


 Intravenous  1,400 ml @ 


 58.333 mls/


 hr  TPN  CONT  4/17/20 22:00


 4/18/20 21:59 DC 4/17/20 22:59


58.333 MLS/HR


 


 Succinylcholine


 Chloride


  (Anectine)  120 mg  1X  ONCE  3/23/20 08:30


 3/23/20 08:31 DC 3/23/20 08:34


120 MG


 


 Vecuronium Bromide


  (Norcuron Bolus)  6 mg  PRN Q6HRS  PRN  5/7/20 19:15


 5/7/20 19:35 DC  














Labs:


Lab





Laboratory Tests








Test


 6/28/20


11:52 6/29/20


00:09 6/29/20


05:53 6/29/20


06:00


 


Glucose (Fingerstick)


 128 mg/dL


(70-99) 123 mg/dL


(70-99) 125 mg/dL


(70-99) 





 


Sodium Level


 


 


 


 137 mmol/L


(136-145)


 


Potassium Level


 


 


 


 4.4 mmol/L


(3.5-5.1)


 


Chloride Level


 


 


 


 101 mmol/L


()


 


Carbon Dioxide Level


 


 


 


 35 mmol/L


(21-32)


 


Anion Gap    1 (6-14) 


 


Blood Urea Nitrogen


 


 


 


 16 mg/dL


(7-20)


 


Creatinine


 


 


 


 0.7 mg/dL


(0.6-1.0)


 


Estimated GFR


(Cockcroft-Gault) 


 


 


 88.9 





 


Glucose Level


 


 


 


 129 mg/dL


(70-99)


 


Calcium Level


 


 


 


 10.7 mg/dL


(8.5-10.1)








Micro


        NEG ANSON 56


        PSEUDOMONAS AERUGINOSA


        ANTIBIOTIC                        RESULT          INTERPRETATION


        AMIKACIN                          <=16                  S


        AZTREONAM                         >16                   R


        CEFTAZIDIME                       >16                   R


        CIPROFLOXACIN                     <=0.25                S


        CEFEPIME                          16                    I


        GENTAMICIN                        <=2                   S


        LEVOFLOXACIN                      <=0.5                 S














                               ** CONTINUED ON NEXT PAGE **





---

--------------------------------------------------------------------------------


---------





RUN DATE: 06/10/20                  [facility] *LIVE*               

  PAGE 2   


RUN TIME: 1121                            Specimen Inquiry                    


 

--------------------------------------------------------------------------------


------------





SPEC: [id number]    PATIENT: JESENIA BEAN                [id number]  

(Continued)


------------------------------------------------------------------

--------------------------








 

--------------------------------------------------------------------------------


------------





  Procedure                         Result                                      

         


------------------------------------------

--------------------------------------------------





  ANTIMICROBIAL SUSCEPTIBILITY  Preliminary   (continued)


        MEROPENEM                         <=1                   S


        PIPERACILLIN/TAZOBACTAM           64                    S


        TOBRAMYCIN                        <=2                   S


        Unless otherwise specified, Testing Performed by:


        [address] [place] [address]


        For Inquires, the Physician may contact the Microbiology


        department at [phone]





-------------------------------------------------------------------------------

-------------





Objective:


Assessment:


Patient with prolonged hospitalization more than 3 months


Multiple medical problems


Multiple surgical procedures





 





Fever intermittently 


Acute pancreatitis with persistent  necrosis


CT a/p 4/9


    Increased ascites. Persistent evidence of necrotizing pancreatitis with 

fluid and phlegmon


   at the pancreas


   4/27 status post ROBERT drain placement; yeast


   5/6 fluid  candida parapsilosis fluid amylase high


CT 6/7





1.   Sequela of pancreatitis with extensive pseudocysts again 


demonstrated, the right-sided collections are slightly larger since the 


prior exam, the left-sided collections are stable. 


6/7 fluid cult PSAE (MDRO),yeast; treated





Cholelithiasis with thickening of the gallbladder wall.


Leucocytosis 


JED,Hyperkalemia, Metabolic acidosis off dialysis


Acute hypoxic resp failure ,bilateral pleural effusion and atelectasis


hypocalcemia 


Prediabetes


HTN


s/p trach


Pleural effusion status post CTS left side


 


Abdominal fluid culture 6 /7 MDRO Pseudomonas, yeast





Plan:


Plan of Care


cont dapto and merrem ( 6/28)


Monitor labs/cults


General surgery following,plans are for surgery tomorrow


Monitor drain output


Maintain aspiration precaution


Supportive care


Contact isolation for CRE/MDRO





Discussed with nursing staff











IVAN FRANZ MD           Jun 29, 2020 08:56

## 2020-06-30 VITALS — SYSTOLIC BLOOD PRESSURE: 132 MMHG | DIASTOLIC BLOOD PRESSURE: 78 MMHG

## 2020-06-30 VITALS — SYSTOLIC BLOOD PRESSURE: 106 MMHG | DIASTOLIC BLOOD PRESSURE: 66 MMHG

## 2020-06-30 VITALS — SYSTOLIC BLOOD PRESSURE: 166 MMHG | DIASTOLIC BLOOD PRESSURE: 90 MMHG

## 2020-06-30 VITALS — SYSTOLIC BLOOD PRESSURE: 168 MMHG | DIASTOLIC BLOOD PRESSURE: 99 MMHG

## 2020-06-30 VITALS — DIASTOLIC BLOOD PRESSURE: 84 MMHG | SYSTOLIC BLOOD PRESSURE: 114 MMHG

## 2020-06-30 VITALS — DIASTOLIC BLOOD PRESSURE: 73 MMHG | SYSTOLIC BLOOD PRESSURE: 116 MMHG

## 2020-06-30 VITALS — SYSTOLIC BLOOD PRESSURE: 125 MMHG | DIASTOLIC BLOOD PRESSURE: 73 MMHG

## 2020-06-30 VITALS — DIASTOLIC BLOOD PRESSURE: 69 MMHG | SYSTOLIC BLOOD PRESSURE: 109 MMHG

## 2020-06-30 VITALS — DIASTOLIC BLOOD PRESSURE: 77 MMHG | SYSTOLIC BLOOD PRESSURE: 128 MMHG

## 2020-06-30 VITALS — DIASTOLIC BLOOD PRESSURE: 65 MMHG | SYSTOLIC BLOOD PRESSURE: 103 MMHG

## 2020-06-30 VITALS — DIASTOLIC BLOOD PRESSURE: 70 MMHG | SYSTOLIC BLOOD PRESSURE: 114 MMHG

## 2020-06-30 VITALS — SYSTOLIC BLOOD PRESSURE: 112 MMHG

## 2020-06-30 VITALS — DIASTOLIC BLOOD PRESSURE: 75 MMHG | SYSTOLIC BLOOD PRESSURE: 125 MMHG

## 2020-06-30 VITALS — DIASTOLIC BLOOD PRESSURE: 67 MMHG | SYSTOLIC BLOOD PRESSURE: 105 MMHG

## 2020-06-30 VITALS — SYSTOLIC BLOOD PRESSURE: 146 MMHG | DIASTOLIC BLOOD PRESSURE: 86 MMHG

## 2020-06-30 VITALS — SYSTOLIC BLOOD PRESSURE: 123 MMHG | DIASTOLIC BLOOD PRESSURE: 77 MMHG

## 2020-06-30 VITALS — SYSTOLIC BLOOD PRESSURE: 95 MMHG | DIASTOLIC BLOOD PRESSURE: 60 MMHG

## 2020-06-30 VITALS — DIASTOLIC BLOOD PRESSURE: 63 MMHG | SYSTOLIC BLOOD PRESSURE: 105 MMHG

## 2020-06-30 VITALS — SYSTOLIC BLOOD PRESSURE: 100 MMHG | DIASTOLIC BLOOD PRESSURE: 60 MMHG

## 2020-06-30 VITALS — SYSTOLIC BLOOD PRESSURE: 158 MMHG | DIASTOLIC BLOOD PRESSURE: 86 MMHG

## 2020-06-30 VITALS — SYSTOLIC BLOOD PRESSURE: 129 MMHG | DIASTOLIC BLOOD PRESSURE: 79 MMHG

## 2020-06-30 VITALS — DIASTOLIC BLOOD PRESSURE: 71 MMHG | SYSTOLIC BLOOD PRESSURE: 116 MMHG

## 2020-06-30 VITALS — DIASTOLIC BLOOD PRESSURE: 90 MMHG | SYSTOLIC BLOOD PRESSURE: 172 MMHG

## 2020-06-30 VITALS — DIASTOLIC BLOOD PRESSURE: 78 MMHG | SYSTOLIC BLOOD PRESSURE: 126 MMHG

## 2020-06-30 VITALS — DIASTOLIC BLOOD PRESSURE: 99 MMHG | SYSTOLIC BLOOD PRESSURE: 164 MMHG

## 2020-06-30 VITALS — DIASTOLIC BLOOD PRESSURE: 86 MMHG | SYSTOLIC BLOOD PRESSURE: 150 MMHG

## 2020-06-30 VITALS — DIASTOLIC BLOOD PRESSURE: 87 MMHG | SYSTOLIC BLOOD PRESSURE: 145 MMHG

## 2020-06-30 VITALS — SYSTOLIC BLOOD PRESSURE: 142 MMHG | DIASTOLIC BLOOD PRESSURE: 80 MMHG

## 2020-06-30 VITALS — DIASTOLIC BLOOD PRESSURE: 72 MMHG | SYSTOLIC BLOOD PRESSURE: 123 MMHG

## 2020-06-30 VITALS — DIASTOLIC BLOOD PRESSURE: 76 MMHG | SYSTOLIC BLOOD PRESSURE: 126 MMHG

## 2020-06-30 VITALS — DIASTOLIC BLOOD PRESSURE: 86 MMHG | SYSTOLIC BLOOD PRESSURE: 146 MMHG

## 2020-06-30 LAB
ALBUMIN SERPL-MCNC: 1.1 G/DL (ref 3.4–5)
ALBUMIN/GLOB SERPL: 0.3 {RATIO} (ref 1–1.7)
ALP SERPL-CCNC: 118 U/L (ref 46–116)
ALT SERPL-CCNC: 12 U/L (ref 14–59)
ANION GAP SERPL CALC-SCNC: 1 MMOL/L (ref 6–14)
ANION GAP SERPL CALC-SCNC: 16 MMOL/L (ref 6–14)
AST SERPL-CCNC: 18 U/L (ref 15–37)
BASE EXCESS ABG: -13 MMOL/L (ref -3–3)
BASOPHILS # BLD AUTO: 0 X10^3/UL (ref 0–0.2)
BASOPHILS NFR BLD: 0 % (ref 0–3)
BILIRUB SERPL-MCNC: 0.4 MG/DL (ref 0.2–1)
BUN SERPL-MCNC: 11 MG/DL (ref 7–20)
BUN SERPL-MCNC: 14 MG/DL (ref 7–20)
BUN/CREAT SERPL: 28 (ref 6–20)
CALCIUM SERPL-MCNC: 11.1 MG/DL (ref 8.5–10.1)
CALCIUM SERPL-MCNC: 7.5 MG/DL (ref 8.5–10.1)
CHLORIDE SERPL-SCNC: 101 MMOL/L (ref 98–107)
CHLORIDE SERPL-SCNC: 107 MMOL/L (ref 98–107)
CO2 SERPL-SCNC: 15 MMOL/L (ref 21–32)
CO2 SERPL-SCNC: 34 MMOL/L (ref 21–32)
CREAT SERPL-MCNC: 0.5 MG/DL (ref 0.6–1)
CREAT SERPL-MCNC: 0.7 MG/DL (ref 0.6–1)
EOSINOPHIL NFR BLD: 0.2 X10^3/UL (ref 0–0.7)
EOSINOPHIL NFR BLD: 1 % (ref 0–3)
ERYTHROCYTE [DISTWIDTH] IN BLOOD BY AUTOMATED COUNT: 14.8 % (ref 11.5–14.5)
ERYTHROCYTE [DISTWIDTH] IN BLOOD BY AUTOMATED COUNT: 16.7 % (ref 11.5–14.5)
ERYTHROCYTE [DISTWIDTH] IN BLOOD BY AUTOMATED COUNT: 18.2 % (ref 11.5–14.5)
GFR SERPLBLD BASED ON 1.73 SQ M-ARVRAT: 131.1 ML/MIN
GFR SERPLBLD BASED ON 1.73 SQ M-ARVRAT: 88.9 ML/MIN
GLOBULIN SER-MCNC: 3.6 G/DL (ref 2.2–3.8)
GLUCOSE SERPL-MCNC: 136 MG/DL (ref 70–99)
GLUCOSE SERPL-MCNC: 169 MG/DL (ref 70–99)
HCO3 BLDA-SCNC: 13 MMOL/L (ref 21–28)
HCT VFR BLD CALC: 20.9 % (ref 36–47)
HCT VFR BLD CALC: 23.1 % (ref 36–47)
HCT VFR BLD CALC: 25.1 % (ref 36–47)
HGB BLD-MCNC: 6.7 G/DL (ref 12–15.5)
HGB BLD-MCNC: 8.1 G/DL (ref 12–15.5)
HGB BLD-MCNC: 8.2 G/DL (ref 12–15.5)
INSPIRATION SETTING TIME VENT: (no result)
LYMPHOCYTES # BLD: 1.4 X10^3/UL (ref 1–4.8)
LYMPHOCYTES NFR BLD AUTO: 10 % (ref 24–48)
MAGNESIUM SERPL-MCNC: 1.4 MG/DL (ref 1.8–2.4)
MAGNESIUM SERPL-MCNC: 2.1 MG/DL (ref 1.8–2.4)
MCH RBC QN AUTO: 27 PG (ref 25–35)
MCH RBC QN AUTO: 28 PG (ref 25–35)
MCH RBC QN AUTO: 30 PG (ref 25–35)
MCHC RBC AUTO-ENTMCNC: 32 G/DL (ref 31–37)
MCHC RBC AUTO-ENTMCNC: 32 G/DL (ref 31–37)
MCHC RBC AUTO-ENTMCNC: 36 G/DL (ref 31–37)
MCV RBC AUTO: 84 FL (ref 79–100)
MCV RBC AUTO: 85 FL (ref 79–100)
MCV RBC AUTO: 89 FL (ref 79–100)
MONO #: 1.2 X10^3/UL (ref 0–1.1)
MONOCYTES NFR BLD: 8 % (ref 0–9)
NEUT #: 11.9 X10^3/UL (ref 1.8–7.7)
NEUTROPHILS NFR BLD AUTO: 80 % (ref 31–73)
PCO2 BLDA: 32 MMHG (ref 35–46)
PHOSPHATE SERPL-MCNC: 3.9 MG/DL (ref 2.6–4.7)
PLATELET # BLD AUTO: 211 X10^3/UL (ref 140–400)
PLATELET # BLD AUTO: 335 X10^3/UL (ref 140–400)
PLATELET # BLD AUTO: 452 X10^3/UL (ref 140–400)
PO2 BLDA: 374 MMHG (ref 75–108)
POTASSIUM SERPL-SCNC: 4.3 MMOL/L (ref 3.5–5.1)
POTASSIUM SERPL-SCNC: 4.4 MMOL/L (ref 3.5–5.1)
PROT SERPL-MCNC: 4.7 G/DL (ref 6.4–8.2)
RBC # BLD AUTO: 2.36 X10^6/UL (ref 3.5–5.4)
RBC # BLD AUTO: 2.77 X10^6/UL (ref 3.5–5.4)
RBC # BLD AUTO: 2.95 X10^6/UL (ref 3.5–5.4)
SAO2 % BLDA: 99 % (ref 92–99)
SODIUM SERPL-SCNC: 136 MMOL/L (ref 136–145)
SODIUM SERPL-SCNC: 138 MMOL/L (ref 136–145)
TRIGL SERPL-MCNC: 155 MG/DL (ref 0–150)
WBC # BLD AUTO: 14.8 X10^3/UL (ref 4–11)
WBC # BLD AUTO: 21.5 X10^3/UL (ref 4–11)
WBC # BLD AUTO: 49.3 X10^3/UL (ref 4–11)

## 2020-06-30 PROCEDURE — 0DHA0UZ INSERTION OF FEEDING DEVICE INTO JEJUNUM, OPEN APPROACH: ICD-10-PCS | Performed by: SURGERY

## 2020-06-30 PROCEDURE — BF101ZZ FLUOROSCOPY OF BILE DUCTS USING LOW OSMOLAR CONTRAST: ICD-10-PCS | Performed by: SURGERY

## 2020-06-30 PROCEDURE — 0FT40ZZ RESECTION OF GALLBLADDER, OPEN APPROACH: ICD-10-PCS | Performed by: SURGERY

## 2020-06-30 RX ADMIN — DILTIAZEM HYDROCHLORIDE PRN MLS/HR: 5 INJECTION INTRAVENOUS at 15:40

## 2020-06-30 RX ADMIN — MEROPENEM SCH MLS/HR: 500 INJECTION, POWDER, FOR SOLUTION INTRAVENOUS at 21:48

## 2020-06-30 RX ADMIN — DEXTROSE PRN MLS/HR: 50 INJECTION, SOLUTION INTRAVENOUS at 21:49

## 2020-06-30 RX ADMIN — ACETYLCYSTEINE SCH MG: 200 INHALANT RESPIRATORY (INHALATION) at 08:30

## 2020-06-30 RX ADMIN — IPRATROPIUM BROMIDE AND ALBUTEROL SULFATE SCH ML: .5; 3 SOLUTION RESPIRATORY (INHALATION) at 12:00

## 2020-06-30 RX ADMIN — BACITRACIN SCH MLS/HR: 5000 INJECTION, POWDER, FOR SOLUTION INTRAMUSCULAR at 14:33

## 2020-06-30 RX ADMIN — ACETYLCYSTEINE SCH MG: 200 INHALANT RESPIRATORY (INHALATION) at 20:01

## 2020-06-30 RX ADMIN — IPRATROPIUM BROMIDE AND ALBUTEROL SULFATE SCH ML: .5; 3 SOLUTION RESPIRATORY (INHALATION) at 08:30

## 2020-06-30 RX ADMIN — INSULIN LISPRO SCH UNITS: 100 INJECTION, SOLUTION INTRAVENOUS; SUBCUTANEOUS at 18:00

## 2020-06-30 RX ADMIN — MEROPENEM SCH MLS/HR: 500 INJECTION, POWDER, FOR SOLUTION INTRAVENOUS at 00:27

## 2020-06-30 RX ADMIN — IPRATROPIUM BROMIDE AND ALBUTEROL SULFATE SCH ML: .5; 3 SOLUTION RESPIRATORY (INHALATION) at 20:02

## 2020-06-30 RX ADMIN — ALBUMIN (HUMAN) PRN MLS/HR: 12.5 INJECTION, SOLUTION INTRAVENOUS at 16:06

## 2020-06-30 RX ADMIN — DAPTOMYCIN SCH MLS/HR: 500 INJECTION, POWDER, LYOPHILIZED, FOR SOLUTION INTRAVENOUS at 21:48

## 2020-06-30 RX ADMIN — MEROPENEM SCH MLS/HR: 500 INJECTION, POWDER, FOR SOLUTION INTRAVENOUS at 12:04

## 2020-06-30 RX ADMIN — IPRATROPIUM BROMIDE AND ALBUTEROL SULFATE SCH ML: .5; 3 SOLUTION RESPIRATORY (INHALATION) at 16:00

## 2020-06-30 RX ADMIN — ALBUMIN (HUMAN) PRN MLS/HR: 12.5 INJECTION, SOLUTION INTRAVENOUS at 15:41

## 2020-06-30 RX ADMIN — IPRATROPIUM BROMIDE AND ALBUTEROL SULFATE SCH ML: .5; 3 SOLUTION RESPIRATORY (INHALATION) at 04:00

## 2020-06-30 RX ADMIN — AMIODARONE HYDROCHLORIDE PRN MLS/HR: 50 INJECTION, SOLUTION INTRAVENOUS at 15:41

## 2020-06-30 RX ADMIN — Medication PRN EACH: at 09:08

## 2020-06-30 RX ADMIN — DEXTROSE SCH MLS/HR: 50 INJECTION, SOLUTION INTRAVENOUS at 08:21

## 2020-06-30 RX ADMIN — DEXTROSE PRN MLS/HR: 50 INJECTION, SOLUTION INTRAVENOUS at 15:42

## 2020-06-30 RX ADMIN — INSULIN LISPRO SCH UNITS: 100 INJECTION, SOLUTION INTRAVENOUS; SUBCUTANEOUS at 12:00

## 2020-06-30 RX ADMIN — INSULIN LISPRO SCH UNITS: 100 INJECTION, SOLUTION INTRAVENOUS; SUBCUTANEOUS at 00:00

## 2020-06-30 RX ADMIN — PANTOPRAZOLE SODIUM SCH MG: 40 INJECTION, POWDER, FOR SOLUTION INTRAVENOUS at 07:45

## 2020-06-30 RX ADMIN — INSULIN LISPRO SCH UNITS: 100 INJECTION, SOLUTION INTRAVENOUS; SUBCUTANEOUS at 05:36

## 2020-06-30 RX ADMIN — DILTIAZEM HYDROCHLORIDE PRN MLS/HR: 5 INJECTION INTRAVENOUS at 21:49

## 2020-06-30 RX ADMIN — MEROPENEM SCH MLS/HR: 500 INJECTION, POWDER, FOR SOLUTION INTRAVENOUS at 06:14

## 2020-06-30 NOTE — NUR
Patient arrived on unit from OR at 1515 accompanied by OR RN and CRNA. Patient sedated and 
placed on ventilator, managed by Dr. Philip. Dr. Flores at bedside. Patient severely 
hypotensive, 2,000 cc NS given as bolus, 1,000 5% Albumin given, levophed and vasopressin 
started. Stat labs drawn, two units PRBCs ordered as was 2 g Magnesium. Will recheck H&H 
post transfusion and Magnesium routine with morning labs. Dr. Flores gave the order to keep 
patient very sedated overnight to help recover and stabilize blood pressure. Patient has 3 
ROBERT drains and 2 Abramsons drains all with sanguinous fluid, notified Dr. Flores of quantity 
of drainage. Vital signs became more stable post transfusion. Dr. Flores gave updates to 
Beth Manzanares David and Isabelle. Update was also called to Beth via telephone contact 
after stabilization and update on post-op care. Patient's boyfriend Rolf also updated.

## 2020-06-30 NOTE — RAD
Exam: Abdomen one view

 

INDICATION: Postop

 

TECHNIQUE: Supine view the abdomen

 

Comparisons: None

 

FINDINGS:

There is a pigtail drain in the right lower quadrant. Penrose drain likely

over the midline abdomen. There is a left lower quadrant drain. 

Additionally there is a jejunostomy tube which has oral contrast in the 

jejunum.

 

No bowel obstruction identified.

 

Visualized osseous structures are unremarkable.

 

IMPRESSION:

Postoperative changes as described above.

 

Electronically signed by: Dayana Pena MD (6/30/2020 6:05 PM) [id number]

## 2020-06-30 NOTE — PDOC
PULMONARY PROGRESS NOTES


Subjective


Laying in bed, in no respiratory distress





Patient intubated on 3/23 , s/p trach 4/6, 


remains on TS, 


episode of AMS and tachycardia yesterday


Vitals





Vital Signs








  Date Time  Temp Pulse Resp B/P (MAP) Pulse Ox O2 Delivery O2 Flow Rate FiO2


 


6/30/20 07:00  117 21 168/99 (122) 99 Tracheal Collar  


 


6/30/20 04:00       8.0 


 


6/30/20 04:00 98.9       





 98.9       








ROS:  No Nausea, No Chest Pain, No Abdominal Pain


General:  Alert, No acute distress


HEENT:  Other (nc at perrl   nose clear    neck  trach site ok   no lab  no 

thyromegaly)


Lungs:  Other (diminshed in bases)


Cardiovascular:  S1, S2


Abdomen:  Soft, Non-tender, Other (multiple ROBERT drains )


Extremities:  Other (+1 BLE edema)


Skin:  Warm, Dry


Labs





Laboratory Tests








Test


 6/28/20


08:15 6/28/20


08:45 6/28/20


11:52 6/29/20


00:09


 


White Blood Count


 12.4 x10^3/uL


(4.0-11.0) 


 


 





 


Red Blood Count


 3.06 x10^6/uL


(3.50-5.40) 


 


 





 


Hemoglobin


 8.5 g/dL


(12.0-15.5) 


 


 





 


Hematocrit


 26.0 %


(36.0-47.0) 


 


 





 


Mean Corpuscular Volume 85 fL ()    


 


Mean Corpuscular Hemoglobin 28 pg (25-35)    


 


Mean Corpuscular Hemoglobin


Concent 33 g/dL


(31-37) 


 


 





 


Red Cell Distribution Width


 17.7 %


(11.5-14.5) 


 


 





 


Platelet Count


 394 x10^3/uL


(140-400) 


 


 





 


Neutrophils (%) (Auto) 78 % (31-73)    


 


Lymphocytes (%) (Auto) 12 % (24-48)    


 


Monocytes (%) (Auto) 8 % (0-9)    


 


Eosinophils (%) (Auto) 2 % (0-3)    


 


Basophils (%) (Auto) 0 % (0-3)    


 


Neutrophils # (Auto)


 9.7 x10^3/uL


(1.8-7.7) 


 


 





 


Lymphocytes # (Auto)


 1.4 x10^3/uL


(1.0-4.8) 


 


 





 


Monocytes # (Auto)


 1.0 x10^3/uL


(0.0-1.1) 


 


 





 


Eosinophils # (Auto)


 0.2 x10^3/uL


(0.0-0.7) 


 


 





 


Basophils # (Auto)


 0.0 x10^3/uL


(0.0-0.2) 


 


 





 


Sodium Level


 137 mmol/L


(136-145) 


 


 





 


Potassium Level


 4.5 mmol/L


(3.5-5.1) 


 


 





 


Chloride Level


 101 mmol/L


() 


 


 





 


Carbon Dioxide Level


 35 mmol/L


(21-32) 


 


 





 


Anion Gap 1 (6-14)    


 


Blood Urea Nitrogen


 16 mg/dL


(7-20) 


 


 





 


Creatinine


 0.6 mg/dL


(0.6-1.0) 


 


 





 


Estimated GFR


(Cockcroft-Gault) 106.3 


 


 


 





 


Glucose Level


 129 mg/dL


(70-99) 


 


 





 


Calcium Level


 10.5 mg/dL


(8.5-10.1) 


 


 





 


Magnesium Level


 2.0 mg/dL


(1.8-2.4) 


 


 





 


O2 Saturation  97 % (92-99)   


 


Arterial Blood pH


 


 7.41


(7.35-7.45) 


 





 


Arterial Blood pCO2 at


Patient Temp 


 55 mmHg


(35-46) 


 





 


Arterial Blood pO2 at Patient


Temp 


 108 mmHg


() 


 





 


Arterial Blood HCO3


 


 34 mmol/L


(21-28) 


 





 


Arterial Blood Base Excess


 


 8 mmol/L


(-3-3) 


 





 


FiO2  33   


 


Glucose (Fingerstick)


 


 


 128 mg/dL


(70-99) 123 mg/dL


(70-99)


 


Test


 6/29/20


05:53 6/29/20


06:00 6/29/20


11:51 6/30/20


00:24


 


Glucose (Fingerstick)


 125 mg/dL


(70-99) 


 125 mg/dL


(70-99) 128 mg/dL


(70-99)


 


Sodium Level


 


 137 mmol/L


(136-145) 


 





 


Potassium Level


 


 4.4 mmol/L


(3.5-5.1) 


 





 


Chloride Level


 


 101 mmol/L


() 


 





 


Carbon Dioxide Level


 


 35 mmol/L


(21-32) 


 





 


Anion Gap  1 (6-14)   


 


Blood Urea Nitrogen


 


 16 mg/dL


(7-20) 


 





 


Creatinine


 


 0.7 mg/dL


(0.6-1.0) 


 





 


Estimated GFR


(Cockcroft-Gault) 


 88.9 


 


 





 


Glucose Level


 


 129 mg/dL


(70-99) 


 





 


Calcium Level


 


 10.7 mg/dL


(8.5-10.1) 


 





 


Test


 6/30/20


05:25 6/30/20


05:34 


 





 


White Blood Count


 14.8 x10^3/uL


(4.0-11.0) 


 


 





 


Red Blood Count


 2.95 x10^6/uL


(3.50-5.40) 


 


 





 


Hemoglobin


 8.1 g/dL


(12.0-15.5) 


 


 





 


Hematocrit


 25.1 %


(36.0-47.0) 


 


 





 


Mean Corpuscular Volume 85 fL ()    


 


Mean Corpuscular Hemoglobin 27 pg (25-35)    


 


Mean Corpuscular Hemoglobin


Concent 32 g/dL


(31-37) 


 


 





 


Red Cell Distribution Width


 18.2 %


(11.5-14.5) 


 


 





 


Platelet Count


 452 x10^3/uL


(140-400) 


 


 





 


Neutrophils (%) (Auto) 80 % (31-73)    


 


Lymphocytes (%) (Auto) 10 % (24-48)    


 


Monocytes (%) (Auto) 8 % (0-9)    


 


Eosinophils (%) (Auto) 1 % (0-3)    


 


Basophils (%) (Auto) 0 % (0-3)    


 


Neutrophils # (Auto)


 11.9 x10^3/uL


(1.8-7.7) 


 


 





 


Lymphocytes # (Auto)


 1.4 x10^3/uL


(1.0-4.8) 


 


 





 


Monocytes # (Auto)


 1.2 x10^3/uL


(0.0-1.1) 


 


 





 


Eosinophils # (Auto)


 0.2 x10^3/uL


(0.0-0.7) 


 


 





 


Basophils # (Auto)


 0.0 x10^3/uL


(0.0-0.2) 


 


 





 


Sodium Level


 136 mmol/L


(136-145) 


 


 





 


Potassium Level


 4.4 mmol/L


(3.5-5.1) 


 


 





 


Chloride Level


 101 mmol/L


() 


 


 





 


Carbon Dioxide Level


 34 mmol/L


(21-32) 


 


 





 


Anion Gap 1 (6-14)    


 


Blood Urea Nitrogen


 14 mg/dL


(7-20) 


 


 





 


Creatinine


 0.5 mg/dL


(0.6-1.0) 


 


 





 


Estimated GFR


(Cockcroft-Gault) 131.1 


 


 


 





 


BUN/Creatinine Ratio 28 (6-20)    


 


Glucose Level


 136 mg/dL


(70-99) 


 


 





 


Calcium Level


 11.1 mg/dL


(8.5-10.1) 


 


 





 


Phosphorus Level


 3.9 mg/dL


(2.6-4.7) 


 


 





 


Magnesium Level


 2.1 mg/dL


(1.8-2.4) 


 


 





 


Total Bilirubin


 0.4 mg/dL


(0.2-1.0) 


 


 





 


Aspartate Amino Transf


(AST/SGOT) 18 U/L (15-37) 


 


 


 





 


Alanine Aminotransferase


(ALT/SGPT) 12 U/L (14-59) 


 


 


 





 


Alkaline Phosphatase


 118 U/L


() 


 


 





 


Total Protein


 4.7 g/dL


(6.4-8.2) 


 


 





 


Albumin


 1.1 g/dL


(3.4-5.0) 


 


 





 


Albumin/Globulin Ratio 0.3 (1.0-1.7)    


 


Triglycerides Level


 155 mg/dL


(0-150) 


 


 





 


Glucose (Fingerstick)


 


 128 mg/dL


(70-99) 


 











Laboratory Tests








Test


 6/29/20


11:51 6/30/20


00:24 6/30/20


05:25 6/30/20


05:34


 


Glucose (Fingerstick)


 125 mg/dL


(70-99) 128 mg/dL


(70-99) 


 128 mg/dL


(70-99)


 


White Blood Count


 


 


 14.8 x10^3/uL


(4.0-11.0) 





 


Red Blood Count


 


 


 2.95 x10^6/uL


(3.50-5.40) 





 


Hemoglobin


 


 


 8.1 g/dL


(12.0-15.5) 





 


Hematocrit


 


 


 25.1 %


(36.0-47.0) 





 


Mean Corpuscular Volume   85 fL ()  


 


Mean Corpuscular Hemoglobin   27 pg (25-35)  


 


Mean Corpuscular Hemoglobin


Concent 


 


 32 g/dL


(31-37) 





 


Red Cell Distribution Width


 


 


 18.2 %


(11.5-14.5) 





 


Platelet Count


 


 


 452 x10^3/uL


(140-400) 





 


Neutrophils (%) (Auto)   80 % (31-73)  


 


Lymphocytes (%) (Auto)   10 % (24-48)  


 


Monocytes (%) (Auto)   8 % (0-9)  


 


Eosinophils (%) (Auto)   1 % (0-3)  


 


Basophils (%) (Auto)   0 % (0-3)  


 


Neutrophils # (Auto)


 


 


 11.9 x10^3/uL


(1.8-7.7) 





 


Lymphocytes # (Auto)


 


 


 1.4 x10^3/uL


(1.0-4.8) 





 


Monocytes # (Auto)


 


 


 1.2 x10^3/uL


(0.0-1.1) 





 


Eosinophils # (Auto)


 


 


 0.2 x10^3/uL


(0.0-0.7) 





 


Basophils # (Auto)


 


 


 0.0 x10^3/uL


(0.0-0.2) 





 


Sodium Level


 


 


 136 mmol/L


(136-145) 





 


Potassium Level


 


 


 4.4 mmol/L


(3.5-5.1) 





 


Chloride Level


 


 


 101 mmol/L


() 





 


Carbon Dioxide Level


 


 


 34 mmol/L


(21-32) 





 


Anion Gap   1 (6-14)  


 


Blood Urea Nitrogen


 


 


 14 mg/dL


(7-20) 





 


Creatinine


 


 


 0.5 mg/dL


(0.6-1.0) 





 


Estimated GFR


(Cockcroft-Gault) 


 


 131.1 


 





 


BUN/Creatinine Ratio   28 (6-20)  


 


Glucose Level


 


 


 136 mg/dL


(70-99) 





 


Calcium Level


 


 


 11.1 mg/dL


(8.5-10.1) 





 


Phosphorus Level


 


 


 3.9 mg/dL


(2.6-4.7) 





 


Magnesium Level


 


 


 2.1 mg/dL


(1.8-2.4) 





 


Total Bilirubin


 


 


 0.4 mg/dL


(0.2-1.0) 





 


Aspartate Amino Transf


(AST/SGOT) 


 


 18 U/L (15-37) 


 





 


Alanine Aminotransferase


(ALT/SGPT) 


 


 12 U/L (14-59) 


 





 


Alkaline Phosphatase


 


 


 118 U/L


() 





 


Total Protein


 


 


 4.7 g/dL


(6.4-8.2) 





 


Albumin


 


 


 1.1 g/dL


(3.4-5.0) 





 


Albumin/Globulin Ratio   0.3 (1.0-1.7)  


 


Triglycerides Level


 


 


 155 mg/dL


(0-150) 











Medications





Active Scripts








 Medications  Dose


 Route/Sig


 Max Daily Dose Days Date Category


 


 Bisoprolol


 Fumarate 5 Mg


 Tablet  10 Mg


 PO DAILY


   3/16/20 Reported








Comments


 CXR 6/28/20


IMPRESSION:


No significant interval change compared to 6/25/2020.


 











ct abdomen /pelvis 6/6


1. Removal of the percutaneous pigtail drainage catheters since the prior 


exam. Sequela of pancreatitis with extensive pseudocysts again 


demonstrated, the right-sided collections are slightly larger since the 


prior exam, the left-sided collections are stable. See above.


2. Moderate to large left pleural effusion with atelectasis and collapse 


of most of the left lower lobe, stable. Small right pleural effusion is 


stable.


3. Gallstone.





ct chest 6/15 reviewed








 GRAM NEG COCCOBACILLI:MANY


        SQUAMOUS EPI CELL:RARE


        PMN (WBCs):FEW


        Unless otherwise specified, Testing Performed by:


        [address] [address]


        For Inquires, the Physician may contact the Microbiology


        department at [phone]





  RESPIRATORY CULTURE  Final  


        Final





        MANY GRAM NEGATIVE RODS on 06/15/20 at 1107


        FINAL ID= [PSEUDOMONAS AERUGINOSA]


        MICRO CHARGES


        PSEUDOMONAS AERUGINOSA





  ANTIMICROBIAL SUSCEPTIBILITY  Final  


        Comment





        NEG ANSON 56


        PSEUDOMONAS AERUGINOSA


        ANTIBIOTIC                        RESULT          INTERPRETATION


        AMIKACIN                          <=16                  S


        AZTREONAM                         <=4                   S


        CEFTAZIDIME                       <=1                   S


        CIPROFLOXACIN                     <=0.25                S


        CEFEPIME                          <=2                   S


        CEFTAZIDIME/AVIBACTAM             <=4                   S


        GENTAMICIN                        <=2                   S


        LEVOFLOXACIN                      <=0.5                 S





Impression


.


IMPRESSION:


1.  Acute hypoxemic respiratory failure secondary to ARDS status post trach, 

developed anemia 6/7, blood drainage from RLQ abdomen drain site, and 

surrounding firmness  / developed septic shock 6/7 from abdomen source, required

levo 6/7


s/p 3 new drains 6/7 with brown color drainage,  on/off vent , on TS now


2.  Gallstone pancreatitis, now with ongoing bleeding from prior drain. Anemic. 

s/p Tx multiple units over several days


3.  septic shock/sepsis, recurrent 6/7, source abdomen. , off levo now, 


4.  Acute kidney injury-, Off HD--renal function decling. suspect JED on CKD due

to hypotension , improved now


5.  Acute gallstone pancreatitis.


6.  Hypoalbuminemia.


7.  Moderate persistent effusions, s/p left thora  5/12, reaccumulation of left 

effusion. O2 requirement not changed. 


8.  Fever- ,hypotension. suspect recurrent sepsis/ likely pancreatic source.  

Per ID, per surgery--


9.  Chronic anemia-- ongoing / s/p PRBC


10. Covid 19 testing negative


11. Moderate to large ascites-S/P paracentisis


12.S/P paracentisis with 4 liters removed on 4/15/20


13. S/P IR drain placement on 5/8/2020, removal, re inserted 6/7


14. Depression/Anxiety 


15. Increase effusion, ? loculated/ s/p chest tube.. drainage slowing down. 200 

cc /24 hr





Plan


.


Surgery schedule for today


1. TS as tolerated  titrate fio2 to keep sat 94%, off  vent 


2. Follow all cultures/ PSA from pelvic drains. Abx per ID/  thoracentesis 

result transudate, await c/s,


3. Follow surgery recs-- Acute pancreatitis with persistent necrosis ---- 4/27 

status post ROBERT drain placement + C paropsilosis; 5/6 + yeast & high amylase; s/p

additional drains on 5/8, removal of drains and now increase pseudocyst and 

increase fluid collection. likely infected fluid/ sepsis. on BS abx.follow 

surgery rec, planning surgical intervention Tuesday 


4. Follow ID recs for ABX---- currently monitoring off ABX 


5. Follow nephrology recs -----


6. Continue TPN 


7. monitor HGB,  4 units since 6/6--monitor HGB transfuse if less than 8.5 


8 s/p  thoracentesis, 5/12, 3 litres removed, s/p ct on 6/15. ct drainage, 180 

cc over the past 24 hrs,  suction -40, am cxr, flushed chest tube 6/22


9. Pt remains critically ill from severe necrotic pancreatis. Even with surgery 

prognosis grim. Surgery informed family.


10. lasix/albumin prn


11.  sputum gram neg cocobacilli. pan sensitive--per ID


12. Abdominal fluid culture 6 /7 MDRO Pseudomonas, yeast-- per ID





     


DVT/GI PPX: protonix---


PT/OT


D/W RN, RT





CC time 30 minutes











STEVE MIRANDA MD              Jun 30, 2020 08:08

## 2020-06-30 NOTE — NUR
Pharmacy TPN Dosing Note



S: JESENIA BEAN is a 49 year old F Currently receiving Central Continuous TPN started 
03/18/20



B:Pertinent PMH: 

Necrotizing pancreatitis

Height: 5 feet, 8 inches

Weight: 86.5 kg



Current diet: NPO 



LABS:

Sodium:    136 

Potassium: 4.4 

Chloride:  101 

Calcium:   11.1 

Corrected Calcium: 13.42 

Magnesium: 2.1 

CO2:       34 

SCr:       0.5 

Glucose:   136 

Albumin:   1.1 

AST:       18 

ALT:       12 



TPN FORMULA:

TPN TYPE:  Central Continuous

AMINO ACIDS:         80 gm

DEXTROSE:            250 gm

LIPIDS:              20 gm

SODIUM CHLORIDE:     80 mEq

SODIUM ACETATE:      - mEq

SODIUM PHOSPHATE:    - mmol

POTASSIUM CHLORIDE:  30 mEq

POTASSIUM ACETATE:   30 mEq

POTASSIUM PHOSPHATE: - mmol

MAGNESIUM:           14 mEq

CALCIUM:             - mEq

INSULIN:             15 units

MULTIPLE VITAMIN:    10 ml

TRACE ELEMENTS:      1 ml(s)



TPN PLAN:  

Continue same TPN.





R: Continue TPN as above.

Will monitor electrolytes, glucose, and tolerance to TPN.



 CHRISTIANNE MELO, [facility], 06/30/20 0975

## 2020-06-30 NOTE — PDOC
Subjective:


Subjective:


I stopped by while still in ICU before leaving for OR.


Doing okay, squeezes my hand.





Objective:


Objective:


D/w nurse - more alert today.


Vital Signs:





                                   Vital Signs








  Date Time  Temp Pulse Resp B/P (MAP) Pulse Ox O2 Delivery O2 Flow Rate FiO2


 


6/30/20 08:30     99 Tracheal Collar 8.0 


 


6/30/20 08:00 98.9 119 22 128/77 (94)    





 98.9       








Labs:





Laboratory Tests








Test


 6/29/20


11:51 6/30/20


00:24 6/30/20


05:25 6/30/20


05:34


 


Glucose (Fingerstick) 125 mg/dL  128 mg/dL   128 mg/dL 


 


White Blood Count   14.8 x10^3/uL  


 


Red Blood Count   2.95 x10^6/uL  


 


Hemoglobin   8.1 g/dL  


 


Hematocrit   25.1 %  


 


Mean Corpuscular Volume   85 fL  


 


Mean Corpuscular Hemoglobin   27 pg  


 


Mean Corpuscular Hemoglobin


Concent 


 


 32 g/dL 


 





 


Red Cell Distribution Width   18.2 %  


 


Platelet Count   452 x10^3/uL  


 


Neutrophils (%) (Auto)   80 %  


 


Lymphocytes (%) (Auto)   10 %  


 


Monocytes (%) (Auto)   8 %  


 


Eosinophils (%) (Auto)   1 %  


 


Basophils (%) (Auto)   0 %  


 


Neutrophils # (Auto)   11.9 x10^3/uL  


 


Lymphocytes # (Auto)   1.4 x10^3/uL  


 


Monocytes # (Auto)   1.2 x10^3/uL  


 


Eosinophils # (Auto)   0.2 x10^3/uL  


 


Basophils # (Auto)   0.0 x10^3/uL  


 


Sodium Level   136 mmol/L  


 


Potassium Level   4.4 mmol/L  


 


Chloride Level   101 mmol/L  


 


Carbon Dioxide Level   34 mmol/L  


 


Anion Gap   1  


 


Blood Urea Nitrogen   14 mg/dL  


 


Creatinine   0.5 mg/dL  


 


Estimated GFR


(Cockcroft-Gault) 


 


 131.1 


 





 


BUN/Creatinine Ratio   28  


 


Glucose Level   136 mg/dL  


 


Calcium Level   11.1 mg/dL  


 


Phosphorus Level   3.9 mg/dL  


 


Magnesium Level   2.1 mg/dL  


 


Total Bilirubin   0.4 mg/dL  


 


Aspartate Amino Transf


(AST/SGOT) 


 


 18 U/L 


 





 


Alanine Aminotransferase


(ALT/SGPT) 


 


 12 U/L 


 





 


Alkaline Phosphatase   118 U/L  


 


Total Protein   4.7 g/dL  


 


Albumin   1.1 g/dL  


 


Albumin/Globulin Ratio   0.3  


 


Triglycerides Level   155 mg/dL  


 


Test


 6/30/20


09:52 


 


 





 


Glucose (Fingerstick) 105 mg/dL    











PE:





GEN: chronically ill, mother present


LUNGS: trach collar


NEURO/PSYCH: awake and alert





A/P:


Complicated pancreatitis





--


To OR today; will follow.





Justicifation of Admission Dx:


Justifications for Admission:


Justification of Admission Dx:  Yes











RAGHAVENDRA MURCIA         Jun 30, 2020 10:17

## 2020-06-30 NOTE — PDOC4
OPERATIVE NOTE


Date:


Date:  Jun 30, 2020





Pre-Op Diagnosis:


Severe gallstone pancreatitis, necrotizing





Post-Op Diagnosis:


same, gastric outlet obstruction





Procedure Performed:


Exploratory laparotomy, lysis of adhesions, subtotal cholecystectomy with 

cholangiogram, gastrojejunostomy tube placement, pancreatic necrosectomy





Surgeon:


Frandy Zhou





Anesthesia Type:


GETA





Blood Loss:


500





Specimans Obtained:


pancreatic necrosis, gallbladder





Findings:


extremely hostile abdomen secondary to extensive pancreatic necrosis, contracted

gallbladder with stones, normal cholangiogram, gastric outlet obstruction 

secondary to pancreatitis





Complications:


none





Operative Note:


After obtaining informed consent, patient was taken to OR, induced under GETA a

nd prepped in the usual fashion.  Midline incision was made with cautery.  Upon 

entering the abdominal cavity, the viscera was completely locked in.  Extensive,

careful blunt dissection was send to assist in mobilizing viscera, especially 

off fascia.  Small bowel and colon appeared to be normal, although edematous, 

but was not fully dissected out, given patient's overall fragility and 

complexity.  Careful dissection was carried out on lateral flanks, exposing 

bilateral areas of pancreatic necrosis.  Several liters of pancreatic necrosis 

evacuated out.  These areas, as well as accessing mid abdomen window below 

duodenum were used to provide extensive pancreatic necrosectomy.  Extensive 

dissection was performed using blunt dissection and extensive irrigation, to 

remove all non viable tissue.  This material amounted to about 10 cm cubed 

volume.  Diffuse, oozing bleeding noted from all services being raw, but no 

overt bleeding encountered.





Liver was main structure identified, as all other planes obliterated.  Duodenum 

carefully dissected out.  Gallbladder chronic inflamed and difficult to dissect 

out.  Careful dome down dissection performed down to infundibulum.  Given 

chronic inflammation, dissected out cystic duct seemed highly likely for 

inappropriate injury.  As such, gallbladder was amputated near base with 

cautery.  Area of cystic artery controlled with 3 0 vicryl.  Gallbladder sent to

pathology.  Multiple stones were evacuated out from cystic duct remnant.  

Cholangiogram was obtained which demonstrated intact anatomy and no retained 

stones.  Cystic duct remnant was controlled with 3 0 PDS.  19 ROBERT drain was 

placed in this area.  





Distal stomach identified.  Gastrotomy was made.  Duodenum noted to be tight, 

c/w gastric outlet obstruction secondary to pancreatitis.  Jejunal portion of 

feeding tube was introduced into duodenum and beyond.  Balloon of gastric 

portion placed into stomach and 3 0 vicryl pursestring used to secured tube.  

Externally, GJ secured with 3 0 nylon.  





Copious irrigation.  No evidence of bleeding or other pathology noted at time of

closure.  Multiple anitra drains placed as listed below.  Fascia closed with 0

looped PDS.  No evidence of increased abdominal pressure and peak pressures 

remained normal during closure.  Additional 0 PDS fascia retention sutures 

placed.  Skin repaired with 3 0 vicryl and 4 0 monocryl.  Dressing placed.  

Penrose left in wound and secured with 3 0 nylon.





Patient tolerated procedure, but required extensive fluid resuscitation and will

transferred to ICU in intubated condition.  All counts correct.  Wound class is 

4, dirty.





Drains are:  RUQ-ROBERT drain in gallbladder fossa; Right mid abd-anitra drain in 

right flank; RLQ anitra in mid abdomen; penrose in midline, LUQ-

gastrojejunostomy, LLQ 24 ROBERT in right flank











GAMAL ZHOU MD             Jun 30, 2020 15:10

## 2020-06-30 NOTE — NUR
Patient's family in and out of room prior to surgery. Patient left for surgery around 0925 
with OR RN and CRNA.

## 2020-06-30 NOTE — NUR
SS following up with discharge planning. SS reviewed pt chart and discussed with pt RN. Pt 
currently in surgery with Dr. Flores. Pt remains on trach collar and TPN. Pt on IV Meropenem 
and Daptomycin. Pt's family visited with pt prior to surgery. SS will continue to follow for 
discharge planning.

## 2020-06-30 NOTE — PDOC
Infectious Disease Note


Subjective:


Subjective





Pt febrile yesterday again T >101


alert awake,comfortable


awaiting surgery later today


Remains on trach collar





Vital Signs:


Vital Signs





Vital Signs








  Date Time  Temp Pulse Resp B/P (MAP) Pulse Ox O2 Delivery O2 Flow Rate FiO2


 


6/30/20 07:00  117 21 168/99 (122) 99 Tracheal Collar  


 


6/30/20 04:00       8.0 


 


6/30/20 04:00 98.9       





 98.9       











Physical Exam:


PHYSICAL EXAM


GENERAL: Patient alert awake comfortable


HEENT: Anicteric, no thrush, NG tube in place


NECK:  Tracheostomy 


LUNGS: Diminished aeration bases, no accessory muscle use - CT on left 


HEART:  S1, S2,regular 


ABDOMEN: Mild distention, bowel sounds present, soft, grimaces to palpation, 

drains x 3


: [place] ( 6/7)


EXTREMITIES: + edema BLE


SKIN: no signs of gen rash 


LUE-PICC  without signs of complications





Medications:


Inpatient Meds:





Current Medications








 Medications


  (Trade)  Dose


 Ordered  Sig/Yee  Start Time


 Stop Time Status Last Admin


Dose Admin


 


 Acetaminophen


  (Tylenol Supp)  650 mg  PRN Q6HRS  PRN  3/24/20 10:30


    6/29/20 18:16


650 MG


 


 Acetaminophen


  (Tylenol)  650 mg  PRN Q6HRS  PRN  3/21/20 03:36


 5/13/20 10:25 DC 4/16/20 19:56


650 MG


 


 Acetylcysteine


  (Mucomyst 20%


 Resp Treatment)  600 mg  RTBID  6/27/20 12:00


    6/29/20 20:15


600 MG


 


 Albumin Human  250 ml @ 


 62.5 mls/hr  1X  ONCE  6/24/20 15:30


 6/24/20 19:29 DC 6/24/20 16:27


62.5 MLS/HR


 


 Albuterol Sulfate


  (Ventolin Neb


 Soln)  2.5 mg  1X  ONCE  3/17/20 22:30


 3/17/20 22:31 DC 3/18/20 00:56


2.5 MG


 


 Albuterol/


 Ipratropium


  (Duoneb)  3 ml  Q4HRS  6/13/20 08:00


    6/30/20 04:00


3 ML


 


 Alteplase,


 Recombinant


  (Cathflo For


 Central Catheter


 Clearance)  4 mg  1X  ONCE  6/17/20 09:15


 6/17/20 09:16 UNV  





 


 Alteplase,


 Recombinant 4 mg/


 Sodium Chloride  20 ml @ 20


 mls/hr  1X  ONCE  6/17/20 10:00


 6/17/20 10:59 DC 6/17/20 10:09


20 MLS/HR


 


 Amino Acids/


 Glycerin/


 Electrolytes  1,000 ml @ 


 75 mls/hr  [id number]  4/20/20 21:15


   UNV  





 


 Artificial Tears


  (Artificial


 Tears)  1 drop  PRN Q15MIN  PRN  4/29/20 05:30


    6/23/20 21:17


1 DROP


 


 Atenolol


  (Tenormin)  100 mg  DAILY  3/17/20 09:00


 3/16/20 20:08 DC  





 


 Atropine Sulfate


  (ATROPINE 0.5mg


 SYRINGE)  0.5 mg  PRN Q5MIN  PRN  4/2/20 08:15


     





 


 Barium Sulfate


  (Varibar Thin


 Liquid Apple)  148 gm  1X  ONCE  5/26/20 11:45


 5/26/20 11:49 DC  





 


 Benzocaine


  (Hurricaine One)  1 spray  1X  ONCE  3/20/20 14:30


 3/20/20 14:31 DC 3/20/20 16:38


1 SPRAY


 


 Bisacodyl


  (Dulcolax Supp)  10 mg  STK-MED ONCE  4/27/20 10:59


 4/27/20 10:59 DC  





 


 Bumetanide


  (Bumex)  2 mg  DAILY  5/8/20 10:00


 5/18/20 17:15 DC 5/18/20 08:07


2 MG


 


 Bupivacaine HCl/


 Epinephrine Bitart


  (Sensorcain-Epi


 0.5%-1:908731 Mpf)  30 ml  STK-MED ONCE  4/27/20 10:58


 4/27/20 10:58 DC 4/27/20 12:01


7 ML


 


 Calcium Carbonate/


 Glycine


  (Tums)  500 mg  PRN [id number]  PRN  3/18/20 17:45


 5/13/20 10:25 DC  





 


 Calcium Chloride


 1000 mg/Sodium


 Chloride  110 ml @ 


 220 mls/hr  1X  ONCE  3/17/20 22:30


 3/17/20 22:59 DC 3/17/20 22:11


220 MLS/HR


 


 Calcium Chloride


 3000 mg/Sodium


 Chloride  1,030 ml @ 


 50 mls/hr  [id number]  3/19/20 08:00


 3/21/20 15:23 DC 3/21/20 02:17


50 MLS/HR


 


 Calcium Gluconate


  (Calcium


 Gluconate)  2,000 mg  1X  ONCE  3/19/20 02:15


 3/19/20 02:16 DC 3/19/20 02:19


2,000 MG


 


 Calcium Gluconate


 1000 mg/Sodium


 Chloride  110 ml @ 


 220 mls/hr  1X  ONCE  3/18/20 03:30


 3/18/20 03:59 DC 3/18/20 03:21


220 MLS/HR


 


 Calcium Gluconate


 2000 mg/Sodium


 Chloride  120 ml @ 


 220 mls/hr  1X  ONCE  3/18/20 07:30


 3/18/20 08:02 DC 3/18/20 09:05


220 MLS/HR


 


 Cefepime HCl


  (Maxipime)  2 gm  Q12HR  3/25/20 09:00


 4/8/20 09:58 DC 4/7/20 20:56


2 GM


 


 Cellulose


  (Surgicel


 Fibrillar 1x2)  1 each  STK-MED ONCE  4/6/20 11:00


 4/6/20 11:01 DC  





 


 Cellulose


  (Surgicel


 Hemostat 2x14)  1 each  STK-MED ONCE  4/27/20 10:58


 4/27/20 10:59 DC  





 


 Cellulose


  (Surgicel


 Hemostat 4x8)  1 each  STK-MED ONCE  4/27/20 10:58


 4/27/20 10:59 DC  





 


 Chlorhexidine


 Gluconate


  (Peridex)  15 ml  BID  6/13/20 09:00


 6/13/20 07:58 DC  





 


 Ciprofloxacin/


 Dextrose  200 ml @ 


 200 mls/hr  Q12HR  6/18/20 21:00


 6/25/20 08:56 DC 6/25/20 08:27


200 MLS/HR


 


 Cyclobenzaprine


 HCl


  (Flexeril)  10 mg  PRN Q6HRS  PRN  4/30/20 10:45


     





 


 Daptomycin 410 mg/


 Sodium Chloride  50 ml @ 


 100 mls/hr  Q24H  6/7/20 14:00


 6/10/20 08:30 DC 6/9/20 13:33


100 MLS/HR


 


 Daptomycin 430 mg/


 Sodium Chloride  50 ml @ 


 100 mls/hr  Q24H  4/25/20 13:00


 4/30/20 20:58 DC 4/30/20 13:00


100 MLS/HR


 


 Daptomycin 450 mg/


 Sodium Chloride  50 ml @ 


 100 mls/hr  Q24H  5/17/20 09:00


 5/21/20 08:30 DC 5/20/20 09:25


100 MLS/HR


 


 Daptomycin 485 mg/


 Sodium Chloride  50 ml @ 


 100 mls/hr  Q24H  5/4/20 11:00


 5/12/20 07:44 DC 5/11/20 13:10


100 MLS/HR


 


 Daptomycin 500 mg/


 Sodium Chloride  50 ml @ 


 100 mls/hr  Q24H  6/28/20 19:00


    6/29/20 18:19


100 MLS/HR


 


 Dexamethasone


 Sodium Phosphate


  (Decadron)  4 mg  STK-MED ONCE  4/27/20 10:56


 4/27/20 10:57 DC  





 


 Dexmedetomidine


 HCl 400 mcg/


 Sodium Chloride  100 ml @ 0


 mls/hr  CONT  PRN  4/2/20 08:15


 5/30/20 18:31 DC 5/30/20 12:57


8 MLS/HR


 


 Dextrose


  (Dextrose


 50%-Water Syringe)  12.5 gm  PRN Q15MIN  PRN  3/16/20 09:30


     





 


 Digoxin


  (Lanoxin)  125 mcg  1X  ONCE  3/19/20 18:00


 3/19/20 18:01 DC 3/19/20 17:10


125 MCG


 


 Diphenhydramine


 HCl


  (Benadryl)  25 mg  1X PRN  PRN  4/24/20 15:45


 4/25/20 15:44 DC  





 


 Duloxetine HCl


  (Cymbalta)  30 mg  DAILY  5/10/20 14:00


 5/13/20 10:25 DC 5/11/20 09:48


30 MG


 


 Enoxaparin Sodium


  (Lovenox 100mg


 Syringe)  100 mg  Q12HR  4/21/20 21:00


   UNV  





 


 Enoxaparin Sodium


  (Lovenox 40mg


 Syringe)  40 mg  Q24H  5/17/20 17:00


 6/7/20 06:50 DC 6/5/20 17:44


40 MG


 


 Etomidate


  (Amidate)  8 mg  1X  ONCE  3/23/20 08:30


 3/23/20 08:31 DC 3/23/20 08:33


8 MG


 


 Fentanyl


  (Duragesic 50mcg/


 Hr Patch)  1 patch  Q72H  6/4/20 21:00


 6/13/20 12:00 DC 6/4/20 21:22


1 PATCH


 


 Fentanyl Citrate


  (Fentanyl 2ml


 Vial)  100 mcg  STK-MED ONCE  6/30/20 07:44


 6/30/20 07:44 DC  





 


 Fentanyl Citrate


  (Fentanyl 5ml


 Vial)  250 mcg  1X  ONCE  5/8/20 09:15


 5/8/20 09:16 DC 5/8/20 09:30


50 MCG


 


 Flumazenil


  (Romazicon)  0.5 mg  STK-MED ONCE  6/7/20 14:48


 6/7/20 14:48 DC  





 


 Fluoxetine HCl


  (PROzac)  20 mg  QHS  6/4/20 21:00


    6/27/20 21:52


20 MG


 


 Furosemide


  (Lasix)  40 mg  1X  ONCE  6/24/20 15:30


 6/24/20 15:33 DC 6/24/20 16:27


40 MG


 


 Haloperidol


 Lactate


  (Haldol Inj)  3 mg  1X  ONCE  5/4/20 14:30


 5/4/20 14:31 DC 5/4/20 14:37


3 MG


 


 Heparin Sodium


  (Porcine)


  (Hep Lock Adult)  500 unit  STK-MED ONCE  4/7/20 09:29


 4/7/20 09:30 DC  





 


 Heparin Sodium


  (Porcine)


  (Heparin Sodium)  5,000 unit  Q12HR  4/27/20 21:00


 5/7/20 09:59 DC 5/6/20 20:57


5,000 UNIT


 


 Heparin Sodium


  (Porcine) 1000


 unit/Sodium


 Chloride  1,001 ml @ 


 1,001 mls/hr  1X  ONCE  6/30/20 06:00


 6/30/20 06:59 DC  





 


 Hydromorphone HCl


  (Dilaudid


 Standard PCA)  12 mg  STK-MED ONCE  5/1/20 15:50


 5/12/20 11:24 DC  





 


 Hydromorphone HCl


  (Dilaudid)  1 mg  PRN Q4HRS  PRN  5/4/20 19:00


 5/18/20 17:10 DC 5/18/20 06:25


1 MG


 


 Info


  (CONTRAST GIVEN


 -- Rx MONITORING)  1 each  PRN DAILY  PRN  3/30/20 11:45


 4/1/20 11:44 DC  





 


 Info


  (Icu Electrolyte


 Protocol)  1 ea  CONT PRN  PRN  3/29/20 13:15


     





 


 Info


  (PHARMACY


 MONITORING -- do


 not chart)  1 each  PRN DAILY  PRN  4/24/20 15:45


 5/26/20 14:14 DC  





 


 Info


  (Tpn Per


 Pharmacy)  1 each  PRN DAILY  PRN  3/18/20 12:30


   UNV  





 


 Insulin Human


 Lispro


  (HumaLOG)  0-9 UNITS  Q6HRS  3/16/20 09:30


    6/24/20 18:05


4 UNITS


 


 Insulin Human


 Regular


  (HumuLIN R VIAL)  5 unit  1X  ONCE  3/17/20 22:30


 3/17/20 22:31 DC 3/17/20 22:14


5 UNIT


 


 Iohexol


  (Omnipaque 240


 Mg/ml)  30 ml  1X  ONCE  3/30/20 11:30


 3/30/20 11:33 DC 3/30/20 11:30


30 ML


 


 Iohexol


  (Omnipaque 300


 Mg/ml)  50 ml  STK-MED ONCE  4/27/20 10:58


 4/27/20 10:59 DC  





 


 Iohexol


  (Omnipaque 350


 Mg/ml)  90 ml  1X  ONCE  3/16/20 03:30


 3/16/20 03:31 DC 3/16/20 03:25


90 ML


 


 Ketorolac


 Tromethamine


  (Toradol 30mg


 Vial)  30 mg  1X  ONCE  3/16/20 03:00


 3/16/20 03:01 DC 3/16/20 02:54


30 MG


 


 Lidocaine HCl


  (Buffered


 Lidocaine 1%)  3 ml  1X  ONCE  6/15/20 13:00


 6/15/20 13:01 DC 6/15/20 13:11


12 ML


 


 Lidocaine HCl


  (Glydo


  (Lidocaine) Jelly)  1 thomas  1X  ONCE  3/20/20 14:30


 3/20/20 14:31 DC 3/20/20 16:38


1 THOMAS


 


 Lidocaine HCl


  (Lidocaine 1%


 20ml Vial)  20 ml  1X  ONCE  6/7/20 15:00


 6/7/20 15:01 DC 6/7/20 15:30


20 ML


 


 Lidocaine HCl


  (Lidocaine Pf 2%


 Vial)  5 ml  STK-MED ONCE  6/30/20 07:44


 6/30/20 07:44 DC  





 


 Lidocaine HCl


  (Xylocaine-Mpf


 1% 2ml Vial)  2 ml  PRN 1X  PRN  4/27/20 07:00


 4/28/20 06:59 DC  





 


 Linezolid/Dextrose  300 ml @ 


 300 mls/hr  Q12HR  5/17/20 09:00


 5/20/20 08:11 DC 5/19/20 21:08


300 MLS/HR


 


 Lorazepam


  (Ativan Inj)  0.25 mg  PRN Q4HRS  PRN  6/3/20 07:30


    6/27/20 13:37


0.25 MG


 


 Magnesium Sulfate  50 ml @ 25


 mls/hr  1X  ONCE  6/16/20 08:15


 6/16/20 10:14 DC  





 


 Meropenem 1 gm/


 Sodium Chloride  100 ml @ 


 200 mls/hr  Q12HR  6/7/20 21:00


 6/25/20 08:56 DC 6/25/20 08:27


200 MLS/HR


 


 Meropenem 500 mg/


 Sodium Chloride  50 ml @ 


 100 mls/hr  Q6HRS  6/28/20 18:00


    6/30/20 06:14


100 MLS/HR


 


 Methylprednisolone


 Sodium Succinate


  (SOLU-Medrol


 125MG VIAL)  125 mg  1X  ONCE  6/13/20 06:15


 6/13/20 06:16 DC 6/13/20 06:26


125 MG


 


 Metoclopramide HCl


  (Reglan Vial)  10 mg  PRN Q3HRS  PRN  5/9/20 16:45


    5/14/20 04:25


10 MG


 


 Metoprolol


 Tartrate


  (Lopressor Vial)  5 mg  PRN Q6HRS  PRN  6/10/20 09:00


    6/18/20 10:14


5 MG


 


 Metronidazole  100 ml @ 


 100 mls/hr  Q8HRS  4/14/20 10:00


 4/21/20 08:10 DC 4/21/20 06:04


100 MLS/HR


 


 Micafungin Sodium


 100 mg/Dextrose  100 ml @ 


 100 mls/hr  Q24H  6/7/20 11:00


 6/25/20 08:56 DC 6/24/20 12:34


100 MLS/HR


 


 Midazolam HCl


  (Versed)  2 mg  1X  ONCE  6/7/20 15:00


 6/7/20 15:01 DC 6/7/20 15:28


1 MG


 


 Midazolam HCl 100


 mg/Sodium Chloride  100 ml @ 7


 mls/hr  CONT  PRN  3/28/20 16:00


 6/3/20 14:38 DC 4/8/20 15:35


7 MLS/HR


 


 Midazolam HCl 50


 mg/Sodium Chloride  50 ml @ 0


 mls/hr  CONT  PRN  3/23/20 08:15


 3/28/20 15:59 DC 3/26/20 22:39


7 MLS/HR


 


 Morphine Sulfate


  (Morphine


 Sulfate)  2 mg  PRN Q2HR  PRN  3/16/20 05:00


 3/17/20 14:15 DC 3/17/20 12:26


2 MG


 


 Multi-Ingred


 Cream/Lotion/Oil/


 Oint


  (Artificial


 Tears Eye


 Ointment)  1 thomas  PRN Q1HR  PRN  3/25/20 17:30


 6/3/20 14:39 DC 4/13/20 08:19


1 THOMAS


 


 Naloxone HCl


  (Narcan)  0.4 mg  STK-MED ONCE  6/7/20 14:48


 6/7/20 14:48 DC  





 


 Norepinephrine


 Bitartrate 8 mg/


 Dextrose  258 ml @ 


 13.332 mls/


 hr  CONT  PRN  6/7/20 06:30


    6/7/20 21:46


13.332 MLS/HR


 


 Ondansetron HCl


  (Zofran)  4 mg  STK-MED ONCE  4/27/20 10:56


 4/27/20 10:57 DC  





 


 Pantoprazole


 Sodium


  (PROTONIX VIAL


 for IV PUSH)  40 mg  DAILYAC  3/16/20 11:30


    6/30/20 07:45


40 MG


 


 Phenylephrine HCl


  (PHENYLEPHRINE


 in 0.9% NACL PF)  1 mg  STK-MED ONCE  4/27/20 12:34


 4/27/20 12:34 DC  





 


 Piperacillin Sod/


 Tazobactam Sod


 3.375 gm/Sodium


 Chloride  50 ml @ 


 100 mls/hr  Q6HRS  5/27/20 12:00


 6/4/20 07:26 DC 6/4/20 06:10


100 MLS/HR


 


 Piperacillin Sod/


 Tazobactam Sod


 4.5 gm/Sodium


 Chloride  100 ml @ 


 200 mls/hr  1X  ONCE  3/16/20 06:00


 3/16/20 06:29 DC 3/16/20 05:44


200 MLS/HR


 


 Potassium


 Chloride 110 meq/


 Magnesium Sulfate


 20 meq/


 Multivitamins 10


 ml/Chromium/


 Copper/Manganese/


 Seleni/Zn 1 ml/


 Insulin Human


 Regular 15 unit/


 Total Parenteral


 Nutrition/Amino


 Acids/Dextrose/


 Fat Emulsion


 Intravenous  1,800 ml @ 


 75 mls/hr  TPN  CONT  5/24/20 22:00


 5/25/20 21:59 DC 5/24/20 22:48


75 MLS/HR


 


 Potassium


 Chloride 15 meq/


 Bicarbonate


 Dialysis Soln w/


 out KCl  5,007.5 ml


  @ 1,000 mls/


 hr  Q5H1M  3/29/20 20:00


 4/2/20 13:08 DC 4/1/20 18:14


1,000 MLS/HR


 


 Potassium


 Chloride 20 meq/


 Bicarbonate


 Dialysis Soln w/


 out KCl  5,010 ml @ 


 1,000 mls/hr  Q5H1M  3/25/20 16:00


 3/29/20 19:59 DC 3/29/20 14:54


1,000 MLS/HR


 


 Potassium


 Chloride 40 meq/


 Potassium Acetate


 60 meq/Magnesium


 Sulfate 10 meq/


 Multivitamins 10


 ml/Chromium/


 Copper/Manganese/


 Seleni/Zn 1 ml/


 Insulin Human


 Regular 20 unit/


 Total Parenteral


 Nutrition/Amino


 Acids/Dextrose/


 Fat Emulsion


 Intravenous  1,800 ml @ 


 75 mls/hr  TPN  CONT  6/4/20 22:00


 6/5/20 21:59 DC 6/5/20 00:03


75 MLS/HR


 


 Potassium


 Chloride 70 meq/


 Magnesium Sulfate


 20 meq/


 Multivitamins 10


 ml/Chromium/


 Copper/Manganese/


 Seleni/Zn 1 ml/


 Insulin Human


 Regular 15 unit/


 Total Parenteral


 Nutrition/Amino


 Acids/Dextrose/


 Fat Emulsion


 Intravenous  1,800 ml @ 


 75 mls/hr  TPN  CONT  5/29/20 22:00


 5/30/20 21:59 DC 5/29/20 23:13


75 MLS/HR


 


 Potassium


 Chloride 75 meq/


 Magnesium Sulfate


 15 meq/


 Multivitamins 10


 ml/Chromium/


 Copper/Manganese/


 Seleni/Zn 0.5 ml/


 Insulin Human


 Regular 15 unit/


 Total Parenteral


 Nutrition/Amino


 Acids/Dextrose/


 Fat Emulsion


 Intravenous  1,920 ml @ 


 80 mls/hr  TPN  CONT  5/9/20 22:00


 5/10/20 21:59 DC 5/9/20 22:41


80 MLS/HR


 


 Potassium


 Chloride 75 meq/


 Magnesium Sulfate


 15 meq/Calcium


 Gluconate 8 meq/


 Multivitamins 10


 ml/Chromium/


 Copper/Manganese/


 Seleni/Zn 0.5 ml/


 Insulin Human


 Regular 15 unit/


 Total Parenteral


 Nutrition/Amino


 Acids/Dextrose/


 Fat Emulsion


 Intravenous  1,920 ml @ 


 80 mls/hr  TPN  CONT  5/7/20 22:00


 5/8/20 21:59 DC 5/7/20 22:28


80 MLS/HR


 


 Potassium


 Chloride 75 meq/


 Magnesium Sulfate


 15 meq/Calcium


 Gluconate 8 meq/


 Multivitamins 10


 ml/Chromium/


 Copper/Manganese/


 Seleni/Zn 0.5 ml/


 Insulin Human


 Regular 20 unit/


 Total Parenteral


 Nutrition/Amino


 Acids/Dextrose/


 Fat Emulsion


 Intravenous  1,920 ml @ 


 80 mls/hr  TPN  CONT  5/6/20 22:00


 5/7/20 21:59 DC 5/6/20 22:00


80 MLS/HR


 


 Potassium


 Chloride 75 meq/


 Magnesium Sulfate


 15 meq/Calcium


 Gluconate 8 meq/


 Multivitamins 10


 ml/Chromium/


 Copper/Manganese/


 Seleni/Zn 0.5 ml/


 Insulin Human


 Regular 25 unit/


 Total Parenteral


 Nutrition/Amino


 Acids/Dextrose/


 Fat Emulsion


 Intravenous  1,920 ml @ 


 80 mls/hr  TPN  CONT  5/4/20 22:00


 5/5/20 21:59 DC 5/4/20 23:08


80 MLS/HR


 


 Potassium


 Chloride 75 meq/


 Magnesium Sulfate


 20 meq/Calcium


 Gluconate 10 meq/


 Multivitamins 10


 ml/Chromium/


 Copper/Manganese/


 Seleni/Zn 0.5 ml/


 Insulin Human


 Regular 25 unit/


 Total Parenteral


 Nutrition/Amino


 Acids/Dextrose/


 Fat Emulsion


 Intravenous  1,920 ml @ 


 80 mls/hr  TPN  CONT  5/3/20 22:00


 5/4/20 21:59 DC 5/3/20 22:04


80 MLS/HR


 


 Potassium


 Chloride 75 meq/


 Magnesium Sulfate


 20 meq/Calcium


 Gluconate 10 meq/


 Multivitamins 10


 ml/Chromium/


 Copper/Manganese/


 Seleni/Zn 0.5 ml/


 Insulin Human


 Regular 30 unit/


 Total Parenteral


 Nutrition/Amino


 Acids/Dextrose/


 Fat Emulsion


 Intravenous  1,920 ml @ 


 80 mls/hr  TPN  CONT  5/2/20 22:00


 5/3/20 22:00 DC 5/2/20 21:51


80 MLS/HR


 


 Potassium


 Chloride 80 meq/


 Magnesium Sulfate


 20 meq/


 Multivitamins 10


 ml/Chromium/


 Copper/Manganese/


 Seleni/Zn 0.5 ml/


 Insulin Human


 Regular 15 unit/


 Total Parenteral


 Nutrition/Amino


 Acids/Dextrose/


 Fat Emulsion


 Intravenous  1,920 ml @ 


 80 mls/hr  TPN  CONT  5/12/20 22:00


 5/13/20 21:59 DC 5/12/20 21:40


80 MLS/HR


 


 Potassium


 Chloride 80 meq/


 Magnesium Sulfate


 20 meq/


 Multivitamins 10


 ml/Chromium/


 Copper/Manganese/


 Seleni/Zn 1 ml/


 Insulin Human


 Regular 15 unit/


 Total Parenteral


 Nutrition/Amino


 Acids/Dextrose/


 Fat Emulsion


 Intravenous  1,800 ml @ 


 75 mls/hr  TPN  CONT  5/31/20 22:00


 6/1/20 21:59 DC 5/31/20 21:54


75 MLS/HR


 


 Potassium


 Chloride 90 meq/


 Magnesium Sulfate


 20 meq/


 Multivitamins 10


 ml/Chromium/


 Copper/Manganese/


 Seleni/Zn 1 ml/


 Insulin Human


 Regular 15 unit/


 Total Parenteral


 Nutrition/Amino


 Acids/Dextrose/


 Fat Emulsion


 Intravenous  1,800 ml @ 


 75 mls/hr  TPN  CONT  5/20/20 22:00


 5/21/20 21:59 DC 5/20/20 22:28


75 MLS/HR


 


 Potassium


 Chloride 90 meq/


 Magnesium Sulfate


 20 meq/


 Multivitamins 10


 ml/Chromium/


 Copper/Manganese/


 Seleni/Zn 1 ml/


 Insulin Human


 Regular 20 unit/


 Total Parenteral


 Nutrition/Amino


 Acids/Dextrose/


 Fat Emulsion


 Intravenous  1,800 ml @ 


 75 mls/hr  TPN  CONT  6/3/20 22:00


 6/4/20 21:59 DC 6/3/20 23:13


75 MLS/HR


 


 Potassium


 Chloride/Water  100 ml @ 


 100 mls/hr  Q1H  6/17/20 08:00


 6/17/20 09:59 DC 6/17/20 09:12


100 MLS/HR


 


 Potassium


 Phosphate 20 mmol/


 Sodium Chloride  106.6667


 ml @ 


 51.667 m...  1X  ONCE  3/25/20 13:00


 3/25/20 15:03 DC 3/25/20 12:51


51.667 MLS/HR


 


 Potassium Acetate


 30 meq/Magnesium


 Sulfate 14 meq/


 Multivitamins 10


 ml/Chromium/


 Copper/Manganese/


 Seleni/Zn 1 ml/


 Insulin Human


 Regular 15 unit/


 Sodium Chloride


 20 meq/Potassium


 Chloride 30 meq/


 Total Parenteral


 Nutrition/Amino


 Acids/Dextrose/


 Fat Emulsion


 Intravenous  1,920 ml @ 


 80 mls/hr  TPN  CONT  6/23/20 22:00


 6/24/20 21:59 DC 6/23/20 21:46


80 MLS/HR


 


 Potassium Acetate


 30 meq/Magnesium


 Sulfate 20 meq/


 Calcium Gluconate


 10 meq/


 Multivitamins 10


 ml/Chromium/


 Copper/Manganese/


 Seleni/Zn 0.5 ml/


 Insulin Human


 Regular 30 unit/


 Potassium


 Chloride 30 meq/


 Total Parenteral


 Nutrition/Amino


 Acids/Dextrose/


 Fat Emulsion


 Intravenous  1,920 ml @ 


 80 mls/hr  TPN  CONT  5/1/20 22:00


 5/2/20 21:59 DC 5/1/20 22:34


80 MLS/HR


 


 Potassium Acetate


 40 meq/Magnesium


 Sulfate 10 meq/


 Multivitamins 10


 ml/Chromium/


 Copper/Manganese/


 Seleni/Zn 1 ml/


 Insulin Human


 Regular 20 unit/


 Total Parenteral


 Nutrition/Amino


 Acids/Dextrose/


 Fat Emulsion


 Intravenous  1,920 ml @ 


 80 mls/hr  TPN  CONT  6/16/20 22:00


 6/17/20 21:59 DC 6/16/20 21:32


80 MLS/HR


 


 Potassium Acetate


 40 meq/Magnesium


 Sulfate 5 meq/


 Multivitamins 10


 ml/Chromium/


 Copper/Manganese/


 Seleni/Zn 1 ml/


 Insulin Human


 Regular 30 unit/


 Total Parenteral


 Nutrition/Amino


 Acids/Dextrose/


 Fat Emulsion


 Intravenous  1,920 ml @ 


 80 mls/hr  TPN  CONT  6/15/20 22:00


 6/16/20 19:34 DC 6/15/20 21:54


80 MLS/HR


 


 Potassium Acetate


 55 meq/Magnesium


 Sulfate 20 meq/


 Calcium Gluconate


 10 meq/


 Multivitamins 10


 ml/Chromium/


 Copper/Manganese/


 Seleni/Zn 0.5 ml/


 Insulin Human


 Regular 30 unit/


 Total Parenteral


 Nutrition/Amino


 Acids/Dextrose/


 Fat Emulsion


 Intravenous  1,920 ml @ 


 80 mls/hr  TPN  CONT  4/30/20 22:00


 5/1/20 21:59 DC 5/1/20 01:00


80 MLS/HR


 


 Potassium Acetate


 55 meq/Magnesium


 Sulfate 20 meq/


 Calcium Gluconate


 10 meq/


 Multivitamins 10


 ml/Chromium/


 Copper/Manganese/


 Seleni/Zn 0.5 ml/


 Insulin Human


 Regular 35 unit/


 Total Parenteral


 Nutrition/Amino


 Acids/Dextrose/


 Fat Emulsion


 Intravenous  1,920 ml @ 


 80 mls/hr  TPN  CONT  4/28/20 22:00


 4/29/20 21:59 DC 4/28/20 22:02


80 MLS/HR


 


 Potassium Acetate


 60 meq/Magnesium


 Sulfate 10 meq/


 Multivitamins 10


 ml/Chromium/


 Copper/Manganese/


 Seleni/Zn 1 ml/


 Insulin Human


 Regular 20 unit/


 Total Parenteral


 Nutrition/Amino


 Acids/Dextrose/


 Fat Emulsion


 Intravenous  1,920 ml @ 


 80 mls/hr  TPN  CONT  6/17/20 22:00


 6/18/20 21:59 DC 6/17/20 21:55


80 MLS/HR


 


 Potassium Acetate


 60 meq/Magnesium


 Sulfate 14 meq/


 Multivitamins 10


 ml/Chromium/


 Copper/Manganese/


 Seleni/Zn 1 ml/


 Insulin Human


 Regular 15 unit/


 Sodium Chloride


 20 meq/Total


 Parenteral


 Nutrition/Amino


 Acids/Dextrose/


 Fat Emulsion


 Intravenous  1,920 ml @ 


 80 mls/hr  TPN  CONT  6/22/20 22:00


 6/23/20 21:59 DC 6/22/20 21:54


80 MLS/HR


 


 Potassium Acetate


 60 meq/Magnesium


 Sulfate 14 meq/


 Multivitamins 10


 ml/Chromium/


 Copper/Manganese/


 Seleni/Zn 1 ml/


 Insulin Human


 Regular 15 unit/


 Total Parenteral


 Nutrition/Amino


 Acids/Dextrose/


 Fat Emulsion


 Intravenous  1,920 ml @ 


 80 mls/hr  TPN  CONT  6/21/20 22:00


 6/22/20 21:59 DC 6/21/20 22:22


80 MLS/HR


 


 Potassium Acetate


 60 meq/Magnesium


 Sulfate 14 meq/


 Multivitamins 10


 ml/Chromium/


 Copper/Manganese/


 Seleni/Zn 1 ml/


 Insulin Human


 Regular 20 unit/


 Total Parenteral


 Nutrition/Amino


 Acids/Dextrose/


 Fat Emulsion


 Intravenous  1,920 ml @ 


 80 mls/hr  TPN  CONT  6/18/20 22:00


 6/19/20 21:59 DC 6/18/20 22:26


80 MLS/HR


 


 Potassium Acetate


 60 meq/Magnesium


 Sulfate 5 meq/


 Multivitamins 10


 ml/Chromium/


 Copper/Manganese/


 Seleni/Zn 1 ml/


 Insulin Human


 Regular 30 unit/


 Total Parenteral


 Nutrition/Amino


 Acids/Dextrose/


 Fat Emulsion


 Intravenous  1,920 ml @ 


 80 mls/hr  TPN  CONT  6/6/20 22:00


 6/7/20 21:59 DC 6/6/20 21:54


80 MLS/HR


 


 Potassium Acetate


 65 meq/Magnesium


 Sulfate 20 meq/


 Calcium Gluconate


 10 meq/


 Multivitamins 10


 ml/Chromium/


 Copper/Manganese/


 Seleni/Zn 0.5 ml/


 Insulin Human


 Regular 30 unit/


 Total Parenteral


 Nutrition/Amino


 Acids/Dextrose/


 Fat Emulsion


 Intravenous  1,920 ml @ 


 80 mls/hr  TPN  CONT  4/29/20 22:00


 4/30/20 21:59 DC 4/29/20 22:22


80 MLS/HR


 


 Potassium Acetate


 80 meq/Magnesium


 Sulfate 5 meq/


 Multivitamins 10


 ml/Chromium/


 Copper/Manganese/


 Seleni/Zn 1 ml/


 Insulin Human


 Regular 20 unit/


 Total Parenteral


 Nutrition/Amino


 Acids/Dextrose/


 Fat Emulsion


 Intravenous  1,920 ml @ 


 80 mls/hr  TPN  CONT  6/5/20 22:00


 6/6/20 21:59 DC 6/5/20 21:59


80 MLS/HR


 


 Prochlorperazine


 Edisylate


  (Compazine)  5 mg  PACU PRN  PRN  4/27/20 07:00


 4/28/20 06:59 DC  





 


 Propofol


  (Diprivan)  200 mg  STK-MED ONCE  6/30/20 07:44


 6/30/20 07:44 DC  





 


 Ringer's Solution  1,000 ml @ 


 30 mls/hr  Q24H  4/27/20 07:00


 4/27/20 18:59 DC  





 


 Rocuronium Bromide


  (Zemuron)  100 mg  STK-MED ONCE  6/30/20 07:44


 6/30/20 07:44 DC  





 


 Saliva Substitute


  (Biotene


 Moisturizing


 Mouth)  2 spray  PRN Q15MIN  PRN  5/21/20 11:00


     





 


 Sevoflurane


  (Ultane)  60 ml  STK-MED ONCE  4/27/20 12:26


 4/27/20 12:27 DC  





 


 Sodium


 Bicarbonate 50


 meq/Sodium


 Chloride  1,050 ml @ 


 75 mls/hr  Q14H  3/18/20 07:30


 3/23/20 10:28 DC 3/22/20 21:10


75 MLS/HR


 


 Sodium Acetate 50


 meq/Potassium


 Acetate 55 meq/


 Magnesium Sulfate


 20 meq/Calcium


 Gluconate 10 meq/


 Multivitamins 10


 ml/Chromium/


 Copper/Manganese/


 Seleni/Zn 0.5 ml/


 Insulin Human


 Regular 35 unit/


 Total Parenteral


 Nutrition/Amino


 Acids/Dextrose/


 Fat Emulsion


 Intravenous  1,800 ml @ 


 75 mls/hr  TPN  CONT  4/25/20 22:00


 4/26/20 21:59 DC 4/25/20 22:03


75 MLS/HR


 


 Sodium Bicarbonate


  (Sodium Bicarb


 Adult 8.4% Syr)  50 meq  1X  ONCE  6/7/20 22:00


 6/7/20 22:01 DC 6/7/20 21:47


50 MEQ


 


 Sodium Chloride


  (Normal Saline


 Flush)  3 ml  QSHIFT  PRN  4/27/20 13:45


     





 


 Sodium Chloride


 80 meq/Potassium


 Chloride 30 meq/


 Potassium Acetate


 30 meq/Magnesium


 Sulfate 14 meq/


 Multivitamins 10


 ml/Chromium/


 Copper/Manganese/


 Seleni/Zn 1 ml/


 Insulin Human


 Regular 15 unit/


 Total Parenteral


 Nutrition/Amino


 Acids/Dextrose/


 Fat Emulsion


 Intravenous  1,920 ml @ 


 80 mls/hr  TPN  CONT  6/29/20 22:00


 6/30/20 21:59  6/29/20 22:09


80 MLS/HR


 


 Sodium Chloride


 90 meq/Calcium


 Gluconate 10 meq/


 Multivitamins 10


 ml/Chromium/


 Copper/Manganese/


 Seleni/Zn 0.5 ml/


 Total Parenteral


 Nutrition/Amino


 Acids/Dextrose/


 Fat Emulsion


 Intravenous  1,512 ml @ 


 63 mls/hr  TPN  CONT  3/18/20 22:00


 3/19/20 21:59 DC 3/18/20 22:06


63 MLS/HR


 


 Sodium Chloride


 90 meq/Calcium


 Gluconate 10 meq/


 Multivitamins 10


 ml/Chromium/


 Copper/Manganese/


 Seleni/Zn 1 ml/


 Total Parenteral


 Nutrition/Amino


 Acids/Dextrose/


 Fat Emulsion


 Intravenous  55.005 ml


  @ 2.292


 mls/hr  TPN  CONT  3/18/20 22:00


 3/18/20 12:33 DC  





 


 Sodium Chloride


 90 meq/Magnesium


 Sulfate 10 meq/


 Calcium Gluconate


 20 meq/


 Multivitamins 10


 ml/Chromium/


 Copper/Manganese/


 Seleni/Zn 0.5 ml/


 Total Parenteral


 Nutrition/Amino


 Acids/Dextrose/


 Fat Emulsion


 Intravenous  1,512 ml @ 


 63 mls/hr  TPN  CONT  3/19/20 22:00


 3/20/20 21:59 DC 3/19/20 22:25


63 MLS/HR


 


 Sodium Chloride


 90 meq/Magnesium


 Sulfate 12 meq/


 Calcium Gluconate


 15 meq/


 Multivitamins 10


 ml/Chromium/


 Copper/Manganese/


 Seleni/Zn 0.5 ml/


 Insulin Human


 Regular 25 unit/


 Total Parenteral


 Nutrition/Amino


 Acids/Dextrose/


 Fat Emulsion


 Intravenous  1,400 ml @ 


 58.333 mls/


 hr  TPN  CONT  4/8/20 22:00


 4/9/20 21:59 DC 4/8/20 21:41


58.333 MLS/HR


 


 Sodium Chloride


 90 meq/Potassium


 Chloride 15 meq/


 Magnesium Sulfate


 12 meq/Calcium


 Gluconate 15 meq/


 Multivitamins 10


 ml/Chromium/


 Copper/Manganese/


 Seleni/Zn 0.5 ml/


 Insulin Human


 Regular 25 unit/


 Total Parenteral


 Nutrition/Amino


 Acids/Dextrose/


 Fat Emulsion


 Intravenous  1,400 ml @ 


 58.333 mls/


 hr  TPN  CONT  4/7/20 22:00


 4/8/20 21:59 DC 4/7/20 22:13


58.333 MLS/HR


 


 Sodium Chloride


 90 meq/Potassium


 Chloride 15 meq/


 Potassium


 Phosphate 10 mmol/


 Magnesium Sulfate


 8 meq/Calcium


 Gluconate 15 meq/


 Multivitamins 10


 ml/Chromium/


 Copper/Manganese/


 Seleni/Zn 0.5 ml/


 Insulin Human


 Regular 25 unit/


 Total Parenteral


 Nutrition/Amino


 Acids/Dextrose/


 Fat Emulsion


 Intravenous  1,400 ml @ 


 58.333 mls/


 hr  TPN  CONT  4/5/20 22:00


 4/6/20 21:59 DC 4/5/20 21:20


58.333 MLS/HR


 


 Sodium Chloride


 90 meq/Potassium


 Chloride 15 meq/


 Potassium


 Phosphate 10 mmol/


 Magnesium Sulfate


 10 meq/Calcium


 Gluconate 20 meq/


 Multivitamins 10


 ml/Chromium/


 Copper/Manganese/


 Seleni/Zn 0.5 ml/


 Total Parenteral


 Nutrition/Amino


 Acids/Dextrose/


 Fat Emulsion


 Intravenous  1,400 ml @ 


 58.333 mls/


 hr  TPN  CONT  3/23/20 22:00


 3/24/20 21:59 DC 3/23/20 21:42


58.333 MLS/HR


 


 Sodium Chloride


 90 meq/Potassium


 Chloride 15 meq/


 Potassium


 Phosphate 10 mmol/


 Magnesium Sulfate


 12 meq/Calcium


 Gluconate 15 meq/


 Multivitamins 10


 ml/Chromium/


 Copper/Manganese/


 Seleni/Zn 0.5 ml/


 Insulin Human


 Regular 25 unit/


 Total Parenteral


 Nutrition/Amino


 Acids/Dextrose/


 Fat Emulsion


 Intravenous  1,400 ml @ 


 58.333 mls/


 hr  TPN  CONT  4/6/20 22:00


 4/7/20 21:59 DC 4/6/20 22:24


58.333 MLS/HR


 


 Sodium Chloride


 90 meq/Potassium


 Chloride 15 meq/


 Potassium


 Phosphate 15 mmol/


 Magnesium Sulfate


 10 meq/Calcium


 Gluconate 15 meq/


 Multivitamins 10


 ml/Chromium/


 Copper/Manganese/


 Seleni/Zn 0.5 ml/


 Total Parenteral


 Nutrition/Amino


 Acids/Dextrose/


 Fat Emulsion


 Intravenous  1,400 ml @ 


 58.333 mls/


 hr  TPN  CONT  3/24/20 22:00


 3/25/20 21:59 DC 3/24/20 22:17


58.333 MLS/HR


 


 Sodium Chloride


 90 meq/Potassium


 Chloride 15 meq/


 Potassium


 Phosphate 15 mmol/


 Magnesium Sulfate


 10 meq/Calcium


 Gluconate 20 meq/


 Multivitamins 10


 ml/Chromium/


 Copper/Manganese/


 Seleni/Zn 0.5 ml/


 Total Parenteral


 Nutrition/Amino


 Acids/Dextrose/


 Fat Emulsion


 Intravenous  1,200 ml @ 


 50 mls/hr  TPN  CONT  3/22/20 22:00


 3/22/20 14:17 DC  





 


 Sodium Chloride


 90 meq/Potassium


 Chloride 15 meq/


 Potassium


 Phosphate 18 mmol/


 Magnesium Sulfate


 8 meq/Calcium


 Gluconate 15 meq/


 Multivitamins 10


 ml/Chromium/


 Copper/Manganese/


 Seleni/Zn 0.5 ml/


 Insulin Human


 Regular 10 unit/


 Total Parenteral


 Nutrition/Amino


 Acids/Dextrose/


 Fat Emulsion


 Intravenous  1,400 ml @ 


 58.333 mls/


 hr  TPN  CONT  3/27/20 22:00


 3/28/20 21:59 DC 3/27/20 21:43


58.333 MLS/HR


 


 Sodium Chloride


 90 meq/Potassium


 Chloride 15 meq/


 Potassium


 Phosphate 18 mmol/


 Magnesium Sulfate


 8 meq/Calcium


 Gluconate 15 meq/


 Multivitamins 10


 ml/Chromium/


 Copper/Manganese/


 Seleni/Zn 0.5 ml/


 Insulin Human


 Regular 15 unit/


 Total Parenteral


 Nutrition/Amino


 Acids/Dextrose/


 Fat Emulsion


 Intravenous  1,400 ml @ 


 58.333 mls/


 hr  TPN  CONT  3/30/20 22:00


 3/31/20 21:59 DC 3/30/20 21:47


58.333 MLS/HR


 


 Sodium Chloride


 90 meq/Potassium


 Chloride 15 meq/


 Potassium


 Phosphate 18 mmol/


 Magnesium Sulfate


 8 meq/Calcium


 Gluconate 15 meq/


 Multivitamins 10


 ml/Chromium/


 Copper/Manganese/


 Seleni/Zn 0.5 ml/


 Insulin Human


 Regular 20 unit/


 Total Parenteral


 Nutrition/Amino


 Acids/Dextrose/


 Fat Emulsion


 Intravenous  1,400 ml @ 


 58.333 mls/


 hr  TPN  CONT  4/2/20 22:00


 4/3/20 21:59 DC 4/2/20 22:45


58.333 MLS/HR


 


 Sodium Chloride


 90 meq/Potassium


 Chloride 15 meq/


 Potassium


 Phosphate 18 mmol/


 Magnesium Sulfate


 8 meq/Calcium


 Gluconate 15 meq/


 Multivitamins 10


 ml/Chromium/


 Copper/Manganese/


 Seleni/Zn 0.5 ml/


 Total Parenteral


 Nutrition/Amino


 Acids/Dextrose/


 Fat Emulsion


 Intravenous  1,400 ml @ 


 58.333 mls/


 hr  TPN  CONT  3/26/20 22:00


 3/27/20 21:59 DC 3/26/20 22:00


58.333 MLS/HR


 


 Sodium Chloride


 90 meq/Potassium


 Phosphate 15 mmol/


 Magnesium Sulfate


 12 meq/Calcium


 Gluconate 15 meq/


 Multivitamins 10


 ml/Chromium/


 Copper/Manganese/


 Seleni/Zn 0.5 ml/


 Insulin Human


 Regular 30 unit/


 Total Parenteral


 Nutrition/Amino


 Acids/Dextrose/


 Fat Emulsion


 Intravenous  1,400 ml @ 


 58.333 mls/


 hr  TPN  CONT  4/10/20 22:00


 4/11/20 21:59 DC 4/10/20 21:49


58.333 MLS/HR


 


 Sodium Chloride


 90 meq/Potassium


 Phosphate 15 mmol/


 Magnesium Sulfate


 12 meq/Calcium


 Gluconate 15 meq/


 Multivitamins 10


 ml/Chromium/


 Copper/Manganese/


 Seleni/Zn 0.5 ml/


 Insulin Human


 Regular 40 unit/


 Total Parenteral


 Nutrition/Amino


 Acids/Dextrose/


 Fat Emulsion


 Intravenous  1,400 ml @ 


 58.333 mls/


 hr  TPN  CONT  4/11/20 22:00


 4/12/20 21:59 DC 4/11/20 21:21


58.333 MLS/HR


 


 Sodium Chloride


 90 meq/Potassium


 Phosphate 19 mmol/


 Magnesium Sulfate


 12 meq/Calcium


 Gluconate 15 meq/


 Multivitamins 10


 ml/Chromium/


 Copper/Manganese/


 Seleni/Zn 0.5 ml/


 Insulin Human


 Regular 40 unit/


 Total Parenteral


 Nutrition/Amino


 Acids/Dextrose/


 Fat Emulsion


 Intravenous  1,400 ml @ 


 58.333 mls/


 hr  TPN  CONT  4/12/20 22:00


 4/13/20 21:59 DC 4/12/20 21:54


58.333 MLS/HR


 


 Sodium Chloride


 90 meq/Potassium


 Phosphate 5 mmol/


 Magnesium Sulfate


 12 meq/Calcium


 Gluconate 15 meq/


 Multivitamins 10


 ml/Chromium/


 Copper/Manganese/


 Seleni/Zn 0.5 ml/


 Insulin Human


 Regular 30 unit/


 Total Parenteral


 Nutrition/Amino


 Acids/Dextrose/


 Fat Emulsion


 Intravenous  1,400 ml @ 


 58.333 mls/


 hr  TPN  CONT  4/9/20 22:00


 4/10/20 21:59 DC 4/9/20 22:08


58.333 MLS/HR


 


 Sodium Chloride


 100 meq/Potassium


 Chloride 40 meq/


 Magnesium Sulfate


 15 meq/Calcium


 Gluconate 15 meq/


 Multivitamins 10


 ml/Chromium/


 Copper/Manganese/


 Seleni/Zn 0.5 ml/


 Insulin Human


 Regular 35 unit/


 Total Parenteral


 Nutrition/Amino


 Acids/Dextrose/


 Fat Emulsion


 Intravenous  1,400 ml @ 


 58.333 mls/


 hr  TPN  CONT  4/19/20 22:00


 4/20/20 21:59 DC 4/19/20 22:46


58.333 MLS/HR


 


 Sodium Chloride


 100 meq/Potassium


 Chloride 40 meq/


 Magnesium Sulfate


 20 meq/Calcium


 Gluconate 10 meq/


 Multivitamins 10


 ml/Chromium/


 Copper/Manganese/


 Seleni/Zn 0.5 ml/


 Insulin Human


 Regular 35 unit/


 Total Parenteral


 Nutrition/Amino


 Acids/Dextrose/


 Fat Emulsion


 Intravenous  1,400 ml @ 


 58.333 mls/


 hr  TPN  CONT  4/23/20 22:00


 4/24/20 21:59 DC 4/24/20 00:06


58.333 MLS/HR


 


 Sodium Chloride


 100 meq/Potassium


 Chloride 40 meq/


 Magnesium Sulfate


 20 meq/Calcium


 Gluconate 15 meq/


 Multivitamins 10


 ml/Chromium/


 Copper/Manganese/


 Seleni/Zn 0.5 ml/


 Insulin Human


 Regular 35 unit/


 Total Parenteral


 Nutrition/Amino


 Acids/Dextrose/


 Fat Emulsion


 Intravenous  1,400 ml @ 


 58.333 mls/


 hr  TPN  CONT  4/22/20 22:00


 4/23/20 21:59 DC 4/22/20 22:27


58.333 MLS/HR


 


 Sodium Chloride


 100 meq/Potassium


 Phosphate 10 mmol/


 Magnesium Sulfate


 12 meq/Calcium


 Gluconate 15 meq/


 Multivitamins 10


 ml/Chromium/


 Copper/Manganese/


 Seleni/Zn 0.5 ml/


 Insulin Human


 Regular 35 unit/


 Potassium


 Chloride 20 meq/


 Total Parenteral


 Nutrition/Amino


 Acids/Dextrose/


 Fat Emulsion


 Intravenous  1,400 ml @ 


 58.333 mls/


 hr  TPN  CONT  4/16/20 22:00


 4/17/20 21:59 DC 4/16/20 22:10


58.333 MLS/HR


 


 Sodium Chloride


 100 meq/Potassium


 Phosphate 19 mmol/


 Magnesium Sulfate


 12 meq/Calcium


 Gluconate 15 meq/


 Multivitamins 10


 ml/Chromium/


 Copper/Manganese/


 Seleni/Zn 0.5 ml/


 Insulin Human


 Regular 40 unit/


 Potassium


 Chloride 20 meq/


 Total Parenteral


 Nutrition/Amino


 Acids/Dextrose/


 Fat Emulsion


 Intravenous  1,400 ml @ 


 58.333 mls/


 hr  TPN  CONT  4/15/20 22:00


 4/16/20 21:59 DC 4/15/20 21:20


58.333 MLS/HR


 


 Sodium Chloride


 100 meq/Potassium


 Phosphate 5 mmol/


 Magnesium Sulfate


 12 meq/Calcium


 Gluconate 15 meq/


 Multivitamins 10


 ml/Chromium/


 Copper/Manganese/


 Seleni/Zn 0.5 ml/


 Insulin Human


 Regular 35 unit/


 Potassium


 Chloride 20 meq/


 Total Parenteral


 Nutrition/Amino


 Acids/Dextrose/


 Fat Emulsion


 Intravenous  1,400 ml @ 


 58.333 mls/


 hr  TPN  CONT  4/17/20 22:00


 4/18/20 21:59 DC 4/17/20 22:59


58.333 MLS/HR


 


 Succinylcholine


 Chloride


  (Anectine)  120 mg  1X  ONCE  3/23/20 08:30


 3/23/20 08:31 DC 3/23/20 08:34


120 MG


 


 Vecuronium Bromide


  (Norcuron Bolus)  6 mg  PRN Q6HRS  PRN  5/7/20 19:15


 5/7/20 19:35 DC  














Labs:


Lab





Laboratory Tests








Test


 6/29/20


11:51 6/30/20


00:24 6/30/20


05:25 6/30/20


05:34


 


Glucose (Fingerstick)


 125 mg/dL


(70-99) 128 mg/dL


(70-99) 


 128 mg/dL


(70-99)


 


White Blood Count


 


 


 14.8 x10^3/uL


(4.0-11.0) 





 


Red Blood Count


 


 


 2.95 x10^6/uL


(3.50-5.40) 





 


Hemoglobin


 


 


 8.1 g/dL


(12.0-15.5) 





 


Hematocrit


 


 


 25.1 %


(36.0-47.0) 





 


Mean Corpuscular Volume   85 fL ()  


 


Mean Corpuscular Hemoglobin   27 pg (25-35)  


 


Mean Corpuscular Hemoglobin


Concent 


 


 32 g/dL


(31-37) 





 


Red Cell Distribution Width


 


 


 18.2 %


(11.5-14.5) 





 


Platelet Count


 


 


 452 x10^3/uL


(140-400) 





 


Neutrophils (%) (Auto)   80 % (31-73)  


 


Lymphocytes (%) (Auto)   10 % (24-48)  


 


Monocytes (%) (Auto)   8 % (0-9)  


 


Eosinophils (%) (Auto)   1 % (0-3)  


 


Basophils (%) (Auto)   0 % (0-3)  


 


Neutrophils # (Auto)


 


 


 11.9 x10^3/uL


(1.8-7.7) 





 


Lymphocytes # (Auto)


 


 


 1.4 x10^3/uL


(1.0-4.8) 





 


Monocytes # (Auto)


 


 


 1.2 x10^3/uL


(0.0-1.1) 





 


Eosinophils # (Auto)


 


 


 0.2 x10^3/uL


(0.0-0.7) 





 


Basophils # (Auto)


 


 


 0.0 x10^3/uL


(0.0-0.2) 





 


Sodium Level


 


 


 136 mmol/L


(136-145) 





 


Potassium Level


 


 


 4.4 mmol/L


(3.5-5.1) 





 


Chloride Level


 


 


 101 mmol/L


() 





 


Carbon Dioxide Level


 


 


 34 mmol/L


(21-32) 





 


Anion Gap   1 (6-14)  


 


Blood Urea Nitrogen


 


 


 14 mg/dL


(7-20) 





 


Creatinine


 


 


 0.5 mg/dL


(0.6-1.0) 





 


Estimated GFR


(Cockcroft-Gault) 


 


 131.1 


 





 


BUN/Creatinine Ratio   28 (6-20)  


 


Glucose Level


 


 


 136 mg/dL


(70-99) 





 


Calcium Level


 


 


 11.1 mg/dL


(8.5-10.1) 





 


Phosphorus Level


 


 


 3.9 mg/dL


(2.6-4.7) 





 


Magnesium Level


 


 


 2.1 mg/dL


(1.8-2.4) 





 


Total Bilirubin


 


 


 0.4 mg/dL


(0.2-1.0) 





 


Aspartate Amino Transf


(AST/SGOT) 


 


 18 U/L (15-37) 


 





 


Alanine Aminotransferase


(ALT/SGPT) 


 


 12 U/L (14-59) 


 





 


Alkaline Phosphatase


 


 


 118 U/L


() 





 


Total Protein


 


 


 4.7 g/dL


(6.4-8.2) 





 


Albumin


 


 


 1.1 g/dL


(3.4-5.0) 





 


Albumin/Globulin Ratio   0.3 (1.0-1.7)  


 


Triglycerides Level


 


 


 155 mg/dL


(0-150) 











Micro


        NEG ANSON 56


        PSEUDOMONAS AERUGINOSA


        ANTIBIOTIC                        RESULT          INTERPRETATION


        AMIKACIN                          <=16                  S


        AZTREONAM                         >16                   R


        CEFTAZIDIME                       >16                   R


        CIPROFLOXACIN                     <=0.25                S


        CEFEPIME                          16                    I


        GENTAMICIN                        <=2                   S


        LEVOFLOXACIN                      <=0.5                 S














                               ** CONTINUED ON NEXT PAGE **





 

--------------------------------------------------------------------------------


------------





RUN DATE: 06/10/20                  [facility]               

  PAGE 2   


RUN TIME: 1121                            Specimen Inquiry                    


 

--------------------------------------------------------------------------------


------------





SPEC: [id number]    PATIENT: JESENIA BEAN                [id number]  

(Continued)


---------------------------------

-----------------------------------------------------------








------------------------------------------------------------------

--------------------------





  Procedure                         Result                                      

         


---------

--------------------------------------------------------------------------------


---





  ANTIMICROBIAL SUSCEPTIBILITY  Preliminary   (continued)


        MEROPENEM                         <=1                   S


        PIPERACILLIN/TAZOBACTAM           64                    S


        TOBRAMYCIN                        <=2                   S


        Unless otherwise specified, Testing Performed by:


        [facility]


        [address] [place] [address]


        For Inquires, the Physician may contact the Microbiology


        department at [phone]





----------------------------------------------

----------------------------------------------





Objective:


Assessment:


Patient with prolonged hospitalization more than 3 months


Multiple medical problems


Multiple surgical procedures





 





Fever intermittently source likely GI


Acute pancreatitis with persistent  necrosis


CT a/p 4/9


    Increased ascites. Persistent evidence of necrotizing pancreatitis with 

fluid and phlegmon


   at the pancreas


   4/27 status post ROBERT drain placement; yeast


   5/6 fluid  candida parapsilosis fluid amylase high


CT 6/7





1.   Sequela of pancreatitis with extensive pseudocysts again 


demonstrated, the right-sided collections are slightly larger since the 


prior exam, the left-sided collections are stable. 


6/7 fluid cult PSAE (MDRO),yeast; treated





Cholelithiasis with thickening of the gallbladder wall.


Leucocytosis 


JED,Hyperkalemia, Metabolic acidosis off dialysis


Acute hypoxic resp failure ,bilateral pleural effusion and atelectasis


hypocalcemia 


Prediabetes


HTN


s/p trach


Pleural effusion status post CTS left side


 


Abdominal fluid culture 6 /7 MDRO Pseudomonas, yeast





Plan:


Plan of Care


cont dapto and merrem ( 6/28)


add micafungin


Monitor labs/cults


abdominal surgery later today


Supportive care


Contact isolation for CRE/MDRO


D/W Mother at bedside


Discussed with nursing staff











IVAN FRANZ MD           Jun 30, 2020 08:19

## 2020-06-30 NOTE — RAD
EXAM: INTRAOPERATIVE CHOLANGIOGRAM.

 

HISTORY: Intraoperative cholangiogram with cholecystectomy.

 

COMPARISON: None.

 

FINDINGS: 2 fluoroscopic images are obtained intraoperatively during 

injection of the cystic duct remnant after cholecystectomy. There are no 

filling defects to suggest retained stones. The common duct is not 

dilated. Fluoroscopy time 14 seconds.

 

IMPRESSION:

1. No evidence of retained stones.

 

Electronically signed by: ASIF Tripathi MD (6/30/2020 1:48 PM) 

[facility]

## 2020-06-30 NOTE — PDOC
SURGICAL PROGRESS NOTE


Subjective


Pre-Op Note


48 yo F with severe necrotizing pancreatitis, gallstone


TO OR for pancreatic necrosectomy, cholecystectomy and feeding tube placement


Pt awake and alert


D/w pt's family extensively R/R/B/A of surgery.  Risks, including, but not 

limited to:  bleeding, infection, damage to surrounding structures, risk of 

anesthesia, risk of pancreatic fistula, risk of death (at least 5%).


They appear to understand, their questions are answered and they elect to 

proceed.


Vital Signs





Vital Signs








  Date Time  Temp Pulse Resp B/P (MAP) Pulse Ox O2 Delivery O2 Flow Rate FiO2


 


6/30/20 08:30     99 Tracheal Collar 8.0 


 


6/30/20 08:00 98.9 119 22 128/77 (94)    





 98.9       








I&O











Intake and Output 


 


 6/30/20





 07:00


 


Intake Total 2354 ml


 


Output Total 2440 ml


 


Balance -86 ml


 


 


 


IV Total 2354 ml


 


Output Urine Total 1690 ml


 


Gastric Drainage Total 750 ml


 


# Bowel Movements 1








Labs





Laboratory Tests








Test


 6/28/20


11:52 6/29/20


00:09 6/29/20


05:53 6/29/20


06:00


 


Glucose (Fingerstick)


 128 mg/dL


(70-99) 123 mg/dL


(70-99) 125 mg/dL


(70-99) 





 


Sodium Level


 


 


 


 137 mmol/L


(136-145)


 


Potassium Level


 


 


 


 4.4 mmol/L


(3.5-5.1)


 


Chloride Level


 


 


 


 101 mmol/L


()


 


Carbon Dioxide Level


 


 


 


 35 mmol/L


(21-32)


 


Anion Gap    1 (6-14) 


 


Blood Urea Nitrogen


 


 


 


 16 mg/dL


(7-20)


 


Creatinine


 


 


 


 0.7 mg/dL


(0.6-1.0)


 


Estimated GFR


(Cockcroft-Gault) 


 


 


 88.9 





 


Glucose Level


 


 


 


 129 mg/dL


(70-99)


 


Calcium Level


 


 


 


 10.7 mg/dL


(8.5-10.1)


 


Test


 6/29/20


11:51 6/30/20


00:24 6/30/20


05:25 6/30/20


05:34


 


Glucose (Fingerstick)


 125 mg/dL


(70-99) 128 mg/dL


(70-99) 


 128 mg/dL


(70-99)


 


White Blood Count


 


 


 14.8 x10^3/uL


(4.0-11.0) 





 


Red Blood Count


 


 


 2.95 x10^6/uL


(3.50-5.40) 





 


Hemoglobin


 


 


 8.1 g/dL


(12.0-15.5) 





 


Hematocrit


 


 


 25.1 %


(36.0-47.0) 





 


Mean Corpuscular Volume   85 fL ()  


 


Mean Corpuscular Hemoglobin   27 pg (25-35)  


 


Mean Corpuscular Hemoglobin


Concent 


 


 32 g/dL


(31-37) 





 


Red Cell Distribution Width


 


 


 18.2 %


(11.5-14.5) 





 


Platelet Count


 


 


 452 x10^3/uL


(140-400) 





 


Neutrophils (%) (Auto)   80 % (31-73)  


 


Lymphocytes (%) (Auto)   10 % (24-48)  


 


Monocytes (%) (Auto)   8 % (0-9)  


 


Eosinophils (%) (Auto)   1 % (0-3)  


 


Basophils (%) (Auto)   0 % (0-3)  


 


Neutrophils # (Auto)


 


 


 11.9 x10^3/uL


(1.8-7.7) 





 


Lymphocytes # (Auto)


 


 


 1.4 x10^3/uL


(1.0-4.8) 





 


Monocytes # (Auto)


 


 


 1.2 x10^3/uL


(0.0-1.1) 





 


Eosinophils # (Auto)


 


 


 0.2 x10^3/uL


(0.0-0.7) 





 


Basophils # (Auto)


 


 


 0.0 x10^3/uL


(0.0-0.2) 





 


Sodium Level


 


 


 136 mmol/L


(136-145) 





 


Potassium Level


 


 


 4.4 mmol/L


(3.5-5.1) 





 


Chloride Level


 


 


 101 mmol/L


() 





 


Carbon Dioxide Level


 


 


 34 mmol/L


(21-32) 





 


Anion Gap   1 (6-14)  


 


Blood Urea Nitrogen


 


 


 14 mg/dL


(7-20) 





 


Creatinine


 


 


 0.5 mg/dL


(0.6-1.0) 





 


Estimated GFR


(Cockcroft-Gault) 


 


 131.1 


 





 


BUN/Creatinine Ratio   28 (6-20)  


 


Glucose Level


 


 


 136 mg/dL


(70-99) 





 


Calcium Level


 


 


 11.1 mg/dL


(8.5-10.1) 





 


Phosphorus Level


 


 


 3.9 mg/dL


(2.6-4.7) 





 


Magnesium Level


 


 


 2.1 mg/dL


(1.8-2.4) 





 


Total Bilirubin


 


 


 0.4 mg/dL


(0.2-1.0) 





 


Aspartate Amino Transf


(AST/SGOT) 


 


 18 U/L (15-37) 


 





 


Alanine Aminotransferase


(ALT/SGPT) 


 


 12 U/L (14-59) 


 





 


Alkaline Phosphatase


 


 


 118 U/L


() 





 


Total Protein


 


 


 4.7 g/dL


(6.4-8.2) 





 


Albumin


 


 


 1.1 g/dL


(3.4-5.0) 





 


Albumin/Globulin Ratio   0.3 (1.0-1.7)  


 


Triglycerides Level


 


 


 155 mg/dL


(0-150) 











Laboratory Tests








Test


 6/29/20


11:51 6/30/20


00:24 6/30/20


05:25 6/30/20


05:34


 


Glucose (Fingerstick)


 125 mg/dL


(70-99) 128 mg/dL


(70-99) 


 128 mg/dL


(70-99)


 


White Blood Count


 


 


 14.8 x10^3/uL


(4.0-11.0) 





 


Red Blood Count


 


 


 2.95 x10^6/uL


(3.50-5.40) 





 


Hemoglobin


 


 


 8.1 g/dL


(12.0-15.5) 





 


Hematocrit


 


 


 25.1 %


(36.0-47.0) 





 


Mean Corpuscular Volume   85 fL ()  


 


Mean Corpuscular Hemoglobin   27 pg (25-35)  


 


Mean Corpuscular Hemoglobin


Concent 


 


 32 g/dL


(31-37) 





 


Red Cell Distribution Width


 


 


 18.2 %


(11.5-14.5) 





 


Platelet Count


 


 


 452 x10^3/uL


(140-400) 





 


Neutrophils (%) (Auto)   80 % (31-73)  


 


Lymphocytes (%) (Auto)   10 % (24-48)  


 


Monocytes (%) (Auto)   8 % (0-9)  


 


Eosinophils (%) (Auto)   1 % (0-3)  


 


Basophils (%) (Auto)   0 % (0-3)  


 


Neutrophils # (Auto)


 


 


 11.9 x10^3/uL


(1.8-7.7) 





 


Lymphocytes # (Auto)


 


 


 1.4 x10^3/uL


(1.0-4.8) 





 


Monocytes # (Auto)


 


 


 1.2 x10^3/uL


(0.0-1.1) 





 


Eosinophils # (Auto)


 


 


 0.2 x10^3/uL


(0.0-0.7) 





 


Basophils # (Auto)


 


 


 0.0 x10^3/uL


(0.0-0.2) 





 


Sodium Level


 


 


 136 mmol/L


(136-145) 





 


Potassium Level


 


 


 4.4 mmol/L


(3.5-5.1) 





 


Chloride Level


 


 


 101 mmol/L


() 





 


Carbon Dioxide Level


 


 


 34 mmol/L


(21-32) 





 


Anion Gap   1 (6-14)  


 


Blood Urea Nitrogen


 


 


 14 mg/dL


(7-20) 





 


Creatinine


 


 


 0.5 mg/dL


(0.6-1.0) 





 


Estimated GFR


(Cockcroft-Gault) 


 


 131.1 


 





 


BUN/Creatinine Ratio   28 (6-20)  


 


Glucose Level


 


 


 136 mg/dL


(70-99) 





 


Calcium Level


 


 


 11.1 mg/dL


(8.5-10.1) 





 


Phosphorus Level


 


 


 3.9 mg/dL


(2.6-4.7) 





 


Magnesium Level


 


 


 2.1 mg/dL


(1.8-2.4) 





 


Total Bilirubin


 


 


 0.4 mg/dL


(0.2-1.0) 





 


Aspartate Amino Transf


(AST/SGOT) 


 


 18 U/L (15-37) 


 





 


Alanine Aminotransferase


(ALT/SGPT) 


 


 12 U/L (14-59) 


 





 


Alkaline Phosphatase


 


 


 118 U/L


() 





 


Total Protein


 


 


 4.7 g/dL


(6.4-8.2) 





 


Albumin


 


 


 1.1 g/dL


(3.4-5.0) 





 


Albumin/Globulin Ratio   0.3 (1.0-1.7)  


 


Triglycerides Level


 


 


 155 mg/dL


(0-150) 











Problem List


Problems


Medical Problems:


(1) Acute pancreatitis


Status: Acute  





(2) Cholelithiasis


Status: Acute  











Justicifation of Admission Dx:


Justifications for Admission:


Justification of Admission Dx:  Yes











GAMAL ZHOU MD             Jun 30, 2020 09:56

## 2020-06-30 NOTE — PDOC
PROGRESS NOTES


Chief Complaint


Chief Complaint





History of Present Illness


48yo F w/ PMHx HTN, prediabetes who presented the emergency room complaints of 

abdominal pain. Patient described off and on 3 days. She states is constant, 

described as a squeezing sensation in a band-like distribution. + nausea, 

vomiting.  She denies any fever or diarrhea.  Patient denies any abdominal 

surgical procedures.  She stateed is worse with movements, car ride.  Pain 

initially was upper abdomen however now pretty much generalized.  Last bowel 

movement was 3/15/2020. Nothing makes her pain better.  Patient denies any 

shortness of breath.  She does state the pain moves into her chest.  Denies any 

headache or visual changes.


Lipase 58132, , , Bilirubin 1.4.


CT abdomen confirms pancreatic inflammation, peripancreatic fluid and 

inflammatory changes around the pancreas consistent with pancreatitis. 

Cholelithiasis and 1.4cm uterine fibroid as well as possible left salpingitis. 

Admitted for further care


Gallstone pancreatitis with necrosis. 


   -CT A/P 6/6 showed multiple pseudocysts, slight larger on the right. s/p 

drains x 3, 6/7.  + PSAE (MDRO-R Cefepime, Zosyn ANSON < 64) and yeast, 


   -s/p drain 4/27. C. parapsilosis. s/p drain 5/6 + yeast & high amylase; s/p 

additional drain on 5/8. Drains removed. 


Ascites s/p paracentesis 4/15 & 5/6. C. parapsilosis 


JED. off HD. 





Impression and plan:


Acute hypoxic Respiratory failure required  mechanical ventilation


Tracheostomy


bilateral pleural effusions/pulm edema s/p Throacentesis on 6/15/2020


Severe Acute gallstone pancreatitis (not a surgical candidate at this time) with

necrosis


Acute kidney failure now requiring dialysis


Gallstones (Calculus of gallbladder with acute cholecystitis without 

obstruction)


HTN 


Intractable pain


Intractable nausea


Covid 19 negative. 


Acute on chronic anemia 


EEG: No seizure activityFever  - better currently - intermittent could be from 

underlying pancreatitis blood cults 5/4 - neg so far


? Ileus with vomiting


Abd distention - U/S and CT reviewed s/p 0.4 L of opaque, debris-containing 

ascites was removed 5/6


Acute pancreatitis with persistent necrosis


Gallstone pancreatitis with necrosis. 


   -CT A/P 6/6 showed multiple pseudocysts, slight larger on the right. s/p 

drains x 3, 6/7.  + PSAE (MDRO-R Cefepime, Zosyn ANSON < 64) and yeast, 


   -s/p drain 4/27. C. parapsilosis. s/p drain 5/6 + yeast & high amylase; s/p 

additional drain on 5/8. Drains removed. 


Ascites s/p paracentesis 4/15 & 5/6. C. parapsilosis 


JED. off HD. 


A large fluid collection in the pancreatic bed has slightly decreased in size, 

described below, the pancreas itself is difficult to  visualize, which could be 

due to necrosis or obscuration of pancreatic  parenchyma from the surrounding 

fluid collection.6/15 


- 4/27 status post ROBERT drain placement + C paropsilosis. s/p additional drains 5/ 8


Anemia - S/p PRBCs


Cholelithiasis with thickening of the gallbladder wall.


Leucocytosis improving


JED, hyperkalemia, Metabolic acidosis off dialysis


hypocalcemia 


Prediabetes


HTN


s/p trach


ESRD on HD


Hyperglycemia


severe protein-caloric malnutrition


Moderate to large left pleural effusion with atelectasis and collapse  of most 

of the left lower lobe, stable





FEN - albumin, NS at 80 cc/hr, TPN


will start Mucomyst for secretions


Off antibiotics 


PPX - SCDs, off lovenox currently, high risk for thrombosis but in light of her 

recent anemia and need for transfusion the risks do not outweigh benefits at 

this time. will continue to evaluate on a daily basis


FULL CODE


Dispo - ICU, critically ill


Poor prognosis


























37 MIN CC TIME





History of Present Illness


History of Present Illness


6/8: IR placed drain on 6/7. 4u PRBC after Hb drop. Hb 8.8 today. Off Levophed 

this morning. T-max 100.3. Much more lethargic today. CXR with left sided 

diffuse infiltrates.


6/9: Tachycardic overnight into the 140s. NGT clamped. On BIPAP currently. 

Drains with serosanguinous discharge. WBC 8, Tmax 99.6F.


6/10: Seen on [facility] shield in ICU.  Hypertensive and tachycardic. Labs stable. 

blood stained drainage from drains. Afebrile.


6/11: Seen on [facility] shield in ICU. She is a bit confused, drowsy, but when 

sitting up is conversational and confusion somewhat clears. She is asking for 

more pain medication. Stable drains, still very tachy.Na 147


6/12: Patient vomited overnight. Aspirated. Tried to pull her trach out, she was

told she would die without her trach, she said "I know, I just want to go home".

Hb 7.6. Afebrile, still very tachycardic. 1055ml out of right sided ROBERT drain


6/13: Overnight hypoxic, on BIPAP. CXR with left sided white out lung. Sig

nificant mucous plug suctioned by RT with improvement in her ABG after 2 hours 

this morning. Not really active, tired, lethargic. 890ml out of drains past 24 

hours. On vent. D/w daughter bedside.


6/14: Still on vent overnight with copious pulmonary drainage on suctioning. 

Labs stable, UOP stable 1L. Drain output still significant with 1505mL. She has 

shown her phone password [id number] and made it clear she does not want her daughters 

to access her phone at this time.


6:15: Patient quite frail, she has had such a lengthy hospital stay that she is 

certainly depressed and as documented 3 days ago is wanting to go home. Most 

likely patient is also tired of being hospitalized with an end point no where in

sight. Reassurance and encouragement provided during my visit. Plans for 

thoracentesis later in the day 


6/16: No acute events reported overnight, case discussed with nursing staff 

patient in no acute distress, complaints of the abdomimal pain during my visit, 

patient is quite depressed, reassurance has been provided, discussed with 

nursing staff at bedside, replace electrolytes 


6/17 off vent / on TS , no distress








6/30 /2020


fever overnight, blood cult,


cont dapto and merrem ( 6/28)


add micafungin


Patient seen and examined ICU BED


plan OR 6/30  for necrosectomy, cholecystectomy and feeding tube placement.


guarded-long-term prognosis 


Sputum from 6/13 - growing PSA - may be colonization I to Meropenem 


Continue meropenem, has MDRP PSAE June 7 from abd


bleeding from Sx. site RLQ and firmness)stable


oncology consulted   


Cholelithiasis. Mild thickened appearance of the gallbladder wall. sono 6/25  

Mild right hydronephrosis.Fluid collection identified anterior to the pancreas. 

   


  


  





6/26/2020


Patient having trouble with secretions this morning, no other complaints, 

discussed with surgical ARNP. Plans for OR on Tuesday. No fever above 99.9 

overnight, remains tachycardic, medications reviewed.





6/27/2020


Patient with thick secretions, no other acute events reported overnight.  

Patient seems to be quite anxious, I have tried to provide reassurance during my

encounter regarding her upcoming surgical procedure.  Patient seems to be quite 

anxious and seems to experience panic attacks when she first wakes up thinking 

that is the day of surgery.  At the present time she seems to be medically 

optimized for planned surgery, discussed with nursing staff at bedside





6/29/2020





Patient seems to be hallucinating and having probably memories from her former 

life prior to this prolonged hospital stay which is 3 months plus in length.  

Immediate plans are for surgical intervention on Tuesday which will consist of a

Whipple procedure.  At the present time the patient remains critically stable 

her prognosis is quite guarded at best.  fever last night  hallucinations 

continues to have chest tube in multiple abdominal drains as well.  All these 

are probable source of infection 





plan OR 6/30  for necrosectomy, cholecystectomy and feeding tube placement.








37 min cc time


  


      




















Date:  Apr 27, 2020





Pre-Op Diagnosis:


Necrotizing pancreatitis





Post-Op Diagnosis:


same





Procedure Performed:


laparoscopic exploration





Surgeon:


Frandy Flores


Asst:  Dr. Luis Santos





Anesthesia Type:


GETA plus local





Blood Loss:


50





Specimans Obtained:


cultures, debris





Findings:


1000 cc ascites suctioned off, cultures sent, diffuse debris, obliteration of 

surgical planes preventing any meaningful exploration




















 


 





 








6/1/2020


Patient seen and examined in the ICU


She appears extremely ill


She is tachypneic at 35 respirations per minute and tachycardic at 132 bpm


She is extremely encephalopathic and shaky


She appears clammy


Chart reviewed


Discussed with RN


Prognosis extremely guarded at best








5/31/2020


Patient still in ICU


Resting with no apparent distress


Chart reviewed








5/30/2020


Patient seen and examined in the ICU


She is wiping her face with a cough


Discussed with RN


Chart reviewed


We hope to get her out of the ICU later today if possible








5/29/2020


Patient seen and examined in the ICU once again


She is back on NG suction


On IV Zosyn


Has IV TPN


Sedated with Precedex but anxious still


Appears somewhat clammy and pale


Chart reviewed


Discussed with RN


She remains critically ill











 


 


BRIEF OPERATIVE NOTE


Pre-Op Diagnosis


Pancreatitis with pseudocysts, suspected infection


Post-Op Diagnosis


same


Procedure Performed


CT abdominal Drains x 3


Surgeon


Hardy


Anesthesia Type:  Conscious Sedation


Findings


3 abdominal drains, 14F, with turbid pancreatic fluid and necrotic debris in 

each.


Complications


No immediate








5/9: Patient today somewhat restless and having bilious secretions from ET tube,

imaging studies ordered, discussed with consultant. Pretty poor prognosis, 

hopefully is not a fistula, poor surgical candidate. 





5/10: Imaging with no acute events, she seems more stable today compared to yest

tamraay. Encouraged as much activity as possible patient at high risk for severe 

depression.





Vitals


Vitals





Vital Signs








  Date Time  Temp Pulse Resp B/P (MAP) Pulse Ox O2 Delivery O2 Flow Rate FiO2


 


6/30/20 08:30     99 Tracheal Collar 8.0 


 


6/30/20 08:00 98.9 119 22 128/77 (94)    





 98.9       











Physical Exam


Physical Exam


GENERAL: Patient alert awake comfortable


HEENT: Anicteric, no thrush, NG tube in place


NECK:  Tracheostomy 


LUNGS: Diminished aeration bases, no accessory muscle use - CT on left 


HEART:  S1, S2,regular 


ABDOMEN: Mild distention, bowel sounds present, soft, grimaces to palpation, 

drains x 3


: [place] ( 6/7)


EXTREMITIES: + edema BLE


SKIN: no signs of gen rash 


LUE-PICC  without signs of complications


General:  Alert, Oriented X3, Cooperative, No acute distress


Heart:  Regular rate (SR/ST), Other (distant heart sounds)


Lungs:  Other (diminshed in bases)


Abdomen:  Soft, No tenderness, Other (drains with pancreatic necrosis)


Extremities:  No cyanosis, Other (3+ bilateral LE pitting edema)


Skin:  No rashes, No significant lesion





Labs


LABS





Laboratory Tests








Test


 6/29/20


11:51 6/30/20


00:24 6/30/20


05:25 6/30/20


05:34


 


Glucose (Fingerstick)


 125 mg/dL


(70-99) 128 mg/dL


(70-99) 


 128 mg/dL


(70-99)


 


White Blood Count


 


 


 14.8 x10^3/uL


(4.0-11.0) 





 


Red Blood Count


 


 


 2.95 x10^6/uL


(3.50-5.40) 





 


Hemoglobin


 


 


 8.1 g/dL


(12.0-15.5) 





 


Hematocrit


 


 


 25.1 %


(36.0-47.0) 





 


Mean Corpuscular Volume   85 fL ()  


 


Mean Corpuscular Hemoglobin   27 pg (25-35)  


 


Mean Corpuscular Hemoglobin


Concent 


 


 32 g/dL


(31-37) 





 


Red Cell Distribution Width


 


 


 18.2 %


(11.5-14.5) 





 


Platelet Count


 


 


 452 x10^3/uL


(140-400) 





 


Neutrophils (%) (Auto)   80 % (31-73)  


 


Lymphocytes (%) (Auto)   10 % (24-48)  


 


Monocytes (%) (Auto)   8 % (0-9)  


 


Eosinophils (%) (Auto)   1 % (0-3)  


 


Basophils (%) (Auto)   0 % (0-3)  


 


Neutrophils # (Auto)


 


 


 11.9 x10^3/uL


(1.8-7.7) 





 


Lymphocytes # (Auto)


 


 


 1.4 x10^3/uL


(1.0-4.8) 





 


Monocytes # (Auto)


 


 


 1.2 x10^3/uL


(0.0-1.1) 





 


Eosinophils # (Auto)


 


 


 0.2 x10^3/uL


(0.0-0.7) 





 


Basophils # (Auto)


 


 


 0.0 x10^3/uL


(0.0-0.2) 





 


Sodium Level


 


 


 136 mmol/L


(136-145) 





 


Potassium Level


 


 


 4.4 mmol/L


(3.5-5.1) 





 


Chloride Level


 


 


 101 mmol/L


() 





 


Carbon Dioxide Level


 


 


 34 mmol/L


(21-32) 





 


Anion Gap   1 (6-14)  


 


Blood Urea Nitrogen


 


 


 14 mg/dL


(7-20) 





 


Creatinine


 


 


 0.5 mg/dL


(0.6-1.0) 





 


Estimated GFR


(Cockcroft-Gault) 


 


 131.1 


 





 


BUN/Creatinine Ratio   28 (6-20)  


 


Glucose Level


 


 


 136 mg/dL


(70-99) 





 


Calcium Level


 


 


 11.1 mg/dL


(8.5-10.1) 





 


Phosphorus Level


 


 


 3.9 mg/dL


(2.6-4.7) 





 


Magnesium Level


 


 


 2.1 mg/dL


(1.8-2.4) 





 


Total Bilirubin


 


 


 0.4 mg/dL


(0.2-1.0) 





 


Aspartate Amino Transf


(AST/SGOT) 


 


 18 U/L (15-37) 


 





 


Alanine Aminotransferase


(ALT/SGPT) 


 


 12 U/L (14-59) 


 





 


Alkaline Phosphatase


 


 


 118 U/L


() 





 


Total Protein


 


 


 4.7 g/dL


(6.4-8.2) 





 


Albumin


 


 


 1.1 g/dL


(3.4-5.0) 





 


Albumin/Globulin Ratio   0.3 (1.0-1.7)  


 


Triglycerides Level


 


 


 155 mg/dL


(0-150) 














Assessment and Plan


Assessmemt and Plan


Problems


Medical Problems:


(1) Acute pancreatitis


Status: Acute  





(2) Cholelithiasis


Status: Acute  











Comment


Review of Relevant


I have reviewed the following items josy (where applicable) has been applied.


Labs





Laboratory Tests








Test


 6/28/20


11:52 6/29/20


00:09 6/29/20


05:53 6/29/20


06:00


 


Glucose (Fingerstick)


 128 mg/dL


(70-99) 123 mg/dL


(70-99) 125 mg/dL


(70-99) 





 


Sodium Level


 


 


 


 137 mmol/L


(136-145)


 


Potassium Level


 


 


 


 4.4 mmol/L


(3.5-5.1)


 


Chloride Level


 


 


 


 101 mmol/L


()


 


Carbon Dioxide Level


 


 


 


 35 mmol/L


(21-32)


 


Anion Gap    1 (6-14) 


 


Blood Urea Nitrogen


 


 


 


 16 mg/dL


(7-20)


 


Creatinine


 


 


 


 0.7 mg/dL


(0.6-1.0)


 


Estimated GFR


(Cockcroft-Gault) 


 


 


 88.9 





 


Glucose Level


 


 


 


 129 mg/dL


(70-99)


 


Calcium Level


 


 


 


 10.7 mg/dL


(8.5-10.1)


 


Test


 6/29/20


11:51 6/30/20


00:24 6/30/20


05:25 6/30/20


05:34


 


Glucose (Fingerstick)


 125 mg/dL


(70-99) 128 mg/dL


(70-99) 


 128 mg/dL


(70-99)


 


White Blood Count


 


 


 14.8 x10^3/uL


(4.0-11.0) 





 


Red Blood Count


 


 


 2.95 x10^6/uL


(3.50-5.40) 





 


Hemoglobin


 


 


 8.1 g/dL


(12.0-15.5) 





 


Hematocrit


 


 


 25.1 %


(36.0-47.0) 





 


Mean Corpuscular Volume   85 fL ()  


 


Mean Corpuscular Hemoglobin   27 pg (25-35)  


 


Mean Corpuscular Hemoglobin


Concent 


 


 32 g/dL


(31-37) 





 


Red Cell Distribution Width


 


 


 18.2 %


(11.5-14.5) 





 


Platelet Count


 


 


 452 x10^3/uL


(140-400) 





 


Neutrophils (%) (Auto)   80 % (31-73)  


 


Lymphocytes (%) (Auto)   10 % (24-48)  


 


Monocytes (%) (Auto)   8 % (0-9)  


 


Eosinophils (%) (Auto)   1 % (0-3)  


 


Basophils (%) (Auto)   0 % (0-3)  


 


Neutrophils # (Auto)


 


 


 11.9 x10^3/uL


(1.8-7.7) 





 


Lymphocytes # (Auto)


 


 


 1.4 x10^3/uL


(1.0-4.8) 





 


Monocytes # (Auto)


 


 


 1.2 x10^3/uL


(0.0-1.1) 





 


Eosinophils # (Auto)


 


 


 0.2 x10^3/uL


(0.0-0.7) 





 


Basophils # (Auto)


 


 


 0.0 x10^3/uL


(0.0-0.2) 





 


Sodium Level


 


 


 136 mmol/L


(136-145) 





 


Potassium Level


 


 


 4.4 mmol/L


(3.5-5.1) 





 


Chloride Level


 


 


 101 mmol/L


() 





 


Carbon Dioxide Level


 


 


 34 mmol/L


(21-32) 





 


Anion Gap   1 (6-14)  


 


Blood Urea Nitrogen


 


 


 14 mg/dL


(7-20) 





 


Creatinine


 


 


 0.5 mg/dL


(0.6-1.0) 





 


Estimated GFR


(Cockcroft-Gault) 


 


 131.1 


 





 


BUN/Creatinine Ratio   28 (6-20)  


 


Glucose Level


 


 


 136 mg/dL


(70-99) 





 


Calcium Level


 


 


 11.1 mg/dL


(8.5-10.1) 





 


Phosphorus Level


 


 


 3.9 mg/dL


(2.6-4.7) 





 


Magnesium Level


 


 


 2.1 mg/dL


(1.8-2.4) 





 


Total Bilirubin


 


 


 0.4 mg/dL


(0.2-1.0) 





 


Aspartate Amino Transf


(AST/SGOT) 


 


 18 U/L (15-37) 


 





 


Alanine Aminotransferase


(ALT/SGPT) 


 


 12 U/L (14-59) 


 





 


Alkaline Phosphatase


 


 


 118 U/L


() 





 


Total Protein


 


 


 4.7 g/dL


(6.4-8.2) 





 


Albumin


 


 


 1.1 g/dL


(3.4-5.0) 





 


Albumin/Globulin Ratio   0.3 (1.0-1.7)  


 


Triglycerides Level


 


 


 155 mg/dL


(0-150) 











Laboratory Tests








Test


 6/29/20


11:51 6/30/20


00:24 6/30/20


05:25 6/30/20


05:34


 


Glucose (Fingerstick)


 125 mg/dL


(70-99) 128 mg/dL


(70-99) 


 128 mg/dL


(70-99)


 


White Blood Count


 


 


 14.8 x10^3/uL


(4.0-11.0) 





 


Red Blood Count


 


 


 2.95 x10^6/uL


(3.50-5.40) 





 


Hemoglobin


 


 


 8.1 g/dL


(12.0-15.5) 





 


Hematocrit


 


 


 25.1 %


(36.0-47.0) 





 


Mean Corpuscular Volume   85 fL ()  


 


Mean Corpuscular Hemoglobin   27 pg (25-35)  


 


Mean Corpuscular Hemoglobin


Concent 


 


 32 g/dL


(31-37) 





 


Red Cell Distribution Width


 


 


 18.2 %


(11.5-14.5) 





 


Platelet Count


 


 


 452 x10^3/uL


(140-400) 





 


Neutrophils (%) (Auto)   80 % (31-73)  


 


Lymphocytes (%) (Auto)   10 % (24-48)  


 


Monocytes (%) (Auto)   8 % (0-9)  


 


Eosinophils (%) (Auto)   1 % (0-3)  


 


Basophils (%) (Auto)   0 % (0-3)  


 


Neutrophils # (Auto)


 


 


 11.9 x10^3/uL


(1.8-7.7) 





 


Lymphocytes # (Auto)


 


 


 1.4 x10^3/uL


(1.0-4.8) 





 


Monocytes # (Auto)


 


 


 1.2 x10^3/uL


(0.0-1.1) 





 


Eosinophils # (Auto)


 


 


 0.2 x10^3/uL


(0.0-0.7) 





 


Basophils # (Auto)


 


 


 0.0 x10^3/uL


(0.0-0.2) 





 


Sodium Level


 


 


 136 mmol/L


(136-145) 





 


Potassium Level


 


 


 4.4 mmol/L


(3.5-5.1) 





 


Chloride Level


 


 


 101 mmol/L


() 





 


Carbon Dioxide Level


 


 


 34 mmol/L


(21-32) 





 


Anion Gap   1 (6-14)  


 


Blood Urea Nitrogen


 


 


 14 mg/dL


(7-20) 





 


Creatinine


 


 


 0.5 mg/dL


(0.6-1.0) 





 


Estimated GFR


(Cockcroft-Gault) 


 


 131.1 


 





 


BUN/Creatinine Ratio   28 (6-20)  


 


Glucose Level


 


 


 136 mg/dL


(70-99) 





 


Calcium Level


 


 


 11.1 mg/dL


(8.5-10.1) 





 


Phosphorus Level


 


 


 3.9 mg/dL


(2.6-4.7) 





 


Magnesium Level


 


 


 2.1 mg/dL


(1.8-2.4) 





 


Total Bilirubin


 


 


 0.4 mg/dL


(0.2-1.0) 





 


Aspartate Amino Transf


(AST/SGOT) 


 


 18 U/L (15-37) 


 





 


Alanine Aminotransferase


(ALT/SGPT) 


 


 12 U/L (14-59) 


 





 


Alkaline Phosphatase


 


 


 118 U/L


() 





 


Total Protein


 


 


 4.7 g/dL


(6.4-8.2) 





 


Albumin


 


 


 1.1 g/dL


(3.4-5.0) 





 


Albumin/Globulin Ratio   0.3 (1.0-1.7)  


 


Triglycerides Level


 


 


 155 mg/dL


(0-150) 











Microbiology


6/28/20 Blood Culture - Preliminary, Resulted


          NO GROWTH AFTER 1 DAY


6/15/20 Gram Stain - Final, Complete


          


6/15/20 Aerobic and Anaerobic Culture - Final, Complete


          


6/13/20 Gram Stain Evaluation - Final, Complete


          


6/13/20 Respiratory Culture - Final, Complete


          


6/13/20 Antimicrobic Susceptibility - Final, Complete


          


6/7/20 Urine Culture - Final, Complete


         


5/30/20 Gram Stain - Final, Complete


          


5/30/20 Aerobic Culture - Final, Complete


Medications





Current Medications


Sodium Chloride 1,000 ml @  1,000 mls/hr Q1H IV  Last administered on 3/16/20at 

03:00;  Start 3/16/20 at 03:00;  Stop 3/16/20 at 03:59;  Status DC


Ondansetron HCl (Zofran) 4 mg 1X  ONCE IVP  Last administered on 3/16/20at 

03:27;  Start 3/16/20 at 03:00;  Stop 3/16/20 at 03:01;  Status DC


Morphine Sulfate (Morphine Sulfate) 4 mg 1X  ONCE IV ;  Start 3/16/20 at 03:00; 

Stop 3/16/20 at 03:01;  Status Cancel


Ketorolac Tromethamine (Toradol 30mg Vial) 30 mg 1X  ONCE IV  Last administered 

on 3/16/20at 02:54;  Start 3/16/20 at 03:00;  Stop 3/16/20 at 03:01;  Status DC


Fentanyl Citrate (Fentanyl 2ml Vial) 25 mcg 1X  ONCE IVP  Last administered on 

3/16/20at 03:23;  Start 3/16/20 at 03:30;  Stop 3/16/20 at 03:31;  Status DC


Fentanyl Citrate (Fentanyl 2ml Vial) 100 mcg STK-MED ONCE .ROUTE ;  Start 3/16/

20 at 03:18;  Stop 3/16/20 at 03:18;  Status DC


Iohexol (Omnipaque 350 Mg/ml) 90 ml 1X  ONCE IV  Last administered on 3/16/20at 

03:25;  Start 3/16/20 at 03:30;  Stop 3/16/20 at 03:31;  Status DC


Info (CONTRAST GIVEN -- Rx MONITORING) 1 each PRN DAILY  PRN MC SEE COMMENTS;  

Start 3/16/20 at 03:30;  Stop 3/18/20 at 03:29;  Status DC


Hydromorphone HCl (Dilaudid) 0.5 mg 1X  ONCE IV  Last administered on 3/16/20at 

03:55;  Start 3/16/20 at 04:30;  Stop 3/16/20 at 04:32;  Status DC


Ondansetron HCl (Zofran) 4 mg PRN Q8HRS  PRN IV NAUSEA/VOMITING 1ST CHOICE;  

Start 3/16/20 at 05:00;  Stop 3/16/20 at 09:27;  Status DC


Morphine Sulfate (Morphine Sulfate) 2 mg PRN Q2HR  PRN IV SEVERE PAIN 7-10 Last 

administered on 3/17/20at 12:26;  Start 3/16/20 at 05:00;  Stop 3/17/20 at 

14:15;  Status DC


Sodium Chloride 1,000 ml @  125 mls/hr Q8H IV  Last administered on 3/16/20at 

20:56;  Start 3/16/20 at 05:00;  Stop 3/17/20 at 04:59;  Status DC


Hydromorphone HCl (Dilaudid) 0.5 mg PRN Q3HRS  PRN IV SEVERE PAIN 7-10 Last 

administered on 3/17/20at 10:06;  Start 3/16/20 at 05:00;  Stop 3/17/20 at 

12:01;  Status DC


Piperacillin Sod/ Tazobactam Sod 4.5 gm/Sodium Chloride 100 ml @  200 mls/hr 1X 

ONCE IV  Last administered on 3/16/20at 05:44;  Start 3/16/20 at 06:00;  Stop 

3/16/20 at 06:29;  Status DC


Ondansetron HCl (Zofran) 4 mg PRN Q4HRS  PRN IV NAUSEA/VOMITING 1ST CHOICE Last 

administered on 6/27/20at 13:37;  Start 3/16/20 at 09:30


Insulin Human Lispro (HumaLOG) 0-9 UNITS Q6HRS SQ  Last administered on 

6/24/20at 18:05;  Start 3/16/20 at 09:30


Dextrose (Dextrose 50%-Water Syringe) 12.5 gm PRN Q15MIN  PRN IV SEE COMMENTS;  

Start 3/16/20 at 09:30


Pantoprazole Sodium (PROTONIX VIAL for IV PUSH) 40 mg DAILYAC IVP  Last 

administered on 6/30/20at 07:45;  Start 3/16/20 at 11:30


Prochlorperazine Edisylate (Compazine) 10 mg PRN Q6HRS  PRN IV NAUSEA/VOMITING, 

2nd CHOICE Last administered on 6/27/20at 10:53;  Start 3/16/20 at 17:45


Atenolol (Tenormin) 100 mg DAILY PO ;  Start 3/17/20 at 09:00;  Stop 3/16/20 at 

20:08;  Status DC


Metoprolol Tartrate (Lopressor Vial) 2.5 mg Q6HRS IVP  Last administered on 

3/17/20at 05:51;  Start 3/16/20 at 20:15;  Stop 3/17/20 at 10:02;  Status DC


Metoprolol Tartrate (Lopressor Vial) 5 mg Q6HRS IVP  Last administered on 

3/26/20at 00:12;  Start 3/17/20 at 10:15;  Stop 3/28/20 at 08:48;  Status DC


Hydromorphone HCl (Dilaudid) 1 mg PRN Q3HRS  PRN IV SEVERE PAIN 7-10 Last 

administered on 3/23/20at 05:13;  Start 3/17/20 at 12:00;  Stop 3/31/20 at 

00:25;  Status DC


Lidocaine HCl (Buffered Lidocaine 1%) 3 ml STK-MED ONCE .ROUTE ;  Start 3/17/20 

at 12:55;  Stop 3/17/20 at 12:56;  Status DC


Albumin Human 500 ml @  125 mls/hr 1X  ONCE IV  Last administered on 3/17/20at 

14:33;  Start 3/17/20 at 14:30;  Stop 3/17/20 at 18:32;  Status DC


Norepinephrine Bitartrate 8 mg/ Dextrose 258 ml @  17.299 mls/ hr CONT  PRN IV 

PER PROTOCOL Last administered on 4/14/20at 12:48;  Start 3/17/20 at 15:30;  

Stop 4/17/20 at 09:19;  Status DC


Sodium Chloride 1,000 ml @  125 mls/hr Q8H IV  Last administered on 3/17/20at 

21:04;  Start 3/17/20 at 16:00;  Stop 3/18/20 at 02:42;  Status DC


Albumin Human 500 ml @  125 mls/hr PRN BID  PRN IV After every 2L NSS & BP < 

90mm Last administered on 6/6/20at 11:40;  Start 3/17/20 at 16:00


Iohexol (Omnipaque 300 Mg/ml) 60 ml 1X  ONCE IV  Last administered on 3/17/20at 

17:20;  Start 3/17/20 at 17:00;  Stop 3/17/20 at 17:01;  Status DC


Info (CONTRAST GIVEN -- Rx MONITORING) 1 each PRN DAILY  PRN MC SEE COMMENTS;  

Start 3/17/20 at 17:00;  Stop 3/19/20 at 16:59;  Status DC


Meropenem 1 gm/ Sodium Chloride 100 ml @  200 mls/hr Q8HRS IV  Last administered

on 3/18/20at 05:45;  Start 3/17/20 at 20:00;  Stop 3/18/20 at 08:48;  Status DC


Furosemide (Lasix) 40 mg 1X  ONCE IVP  Last administered on 3/17/20at 22:12;  

Start 3/17/20 at 22:30;  Stop 3/17/20 at 22:31;  Status DC


Calcium Chloride 1000 mg/Sodium Chloride 110 ml @  220 mls/hr 1X  ONCE IV  Last 

administered on 3/17/20at 22:11;  Start 3/17/20 at 22:30;  Stop 3/17/20 at 

22:59;  Status DC


Albuterol Sulfate (Ventolin Neb Soln) 2.5 mg 1X  ONCE NEB  Last administered on 

3/18/20at 00:56;  Start 3/17/20 at 22:30;  Stop 3/17/20 at 22:31;  Status DC


Insulin Human Regular (HumuLIN R VIAL) 5 unit 1X  ONCE IV  Last administered on 

3/17/20at 22:14;  Start 3/17/20 at 22:30;  Stop 3/17/20 at 22:31;  Status DC


Magnesium Sulfate 50 ml @ 25 mls/hr 1X  ONCE IV  Last administered on 3/18/20at 

02:57;  Start 3/18/20 at 03:00;  Stop 3/18/20 at 04:59;  Status DC


Calcium Gluconate 1000 mg/Sodium Chloride 110 ml @  220 mls/hr 1X  ONCE IV  Last

administered on 3/18/20at 02:46;  Start 3/18/20 at 03:00;  Stop 3/18/20 at 

03:29;  Status DC


Sodium Chloride 1,000 ml @  200 mls/hr Q5H IV  Last administered on 3/18/20at 

02:46;  Start 3/18/20 at 03:00;  Stop 3/18/20 at 10:21;  Status DC


Calcium Gluconate 1000 mg/Sodium Chloride 110 ml @  220 mls/hr 1X  ONCE IV  Last

administered on 3/18/20at 03:21;  Start 3/18/20 at 03:30;  Stop 3/18/20 at 

03:59;  Status DC


Sodium Bicarbonate 50 meq/Sodium Chloride 1,050 ml @  75 mls/hr Q14H IV  Last 

administered on 3/22/20at 21:10;  Start 3/18/20 at 07:30;  Stop 3/23/20 at 

10:28;  Status DC


Calcium Gluconate 2000 mg/Sodium Chloride 120 ml @  220 mls/hr 1X  ONCE IV  Last

administered on 3/18/20at 09:05;  Start 3/18/20 at 07:30;  Stop 3/18/20 at 

08:02;  Status DC


Lidocaine HCl (Xylocaine-Mpf 1% 2ml Vial) 2 ml STK-MED ONCE .ROUTE ;  Start 3

/18/20 at 08:47;  Stop 3/18/20 at 08:47;  Status DC


Meropenem 500 mg/ Sodium Chloride 50 ml @  100 mls/hr Q12HR IV  Last adminis

tered on 3/23/20at 21:01;  Start 3/18/20 at 18:00;  Stop 3/24/20 at 07:58;  

Status DC


Lidocaine HCl (Buffered Lidocaine 1%) 3 ml STK-MED ONCE .ROUTE ;  Start 3/18/20 

at 09:46;  Stop 3/18/20 at 09:46;  Status DC


Lidocaine HCl (Buffered Lidocaine 1%) 6 ml 1X  ONCE INJ  Last administered on 

3/18/20at 10:26;  Start 3/18/20 at 10:15;  Stop 3/18/20 at 10:16;  Status DC


Info (Tpn Per Pharmacy) 1 each PRN DAILY  PRN MC SEE COMMENTS Last administered 

on 6/30/20at 09:08;  Start 3/18/20 at 12:00


Sodium Chloride 1,000 ml @  1,000 mls/hr Q1H PRN IV hypotension;  Start 3/18/20 

at 12:07;  Stop 3/18/20 at 18:06;  Status DC


Diphenhydramine HCl (Benadryl) 25 mg 1X PRN  PRN IV ITCHING;  Start 3/18/20 at 

12:15;  Stop 3/19/20 at 12:14;  Status DC


Diphenhydramine HCl (Benadryl) 25 mg 1X PRN  PRN IV ITCHING;  Start 3/18/20 at 

12:15;  Stop 3/19/20 at 12:14;  Status DC


Sodium Chloride 1,000 ml @  400 mls/hr Q2H30M PRN IV PATENCY;  Start 3/18/20 at 

12:07;  Stop 3/19/20 at 00:06;  Status DC


Info (PHARMACY MONITORING -- do not chart) 1 each PRN DAILY  PRN MC SEE 

COMMENTS;  Start 3/18/20 at 12:15;  Stop 3/20/20 at 08:13;  Status DC


Sodium Chloride 90 meq/Calcium Gluconate 10 meq/ Multivitamins 10 ml/Chromium/ 

Copper/Manganese/ Seleni/Zn 1 ml/ Total Parenteral Nutrition/Amino Acids/Dextros

e/ Fat Emulsion Intravenous 55.005 ml  @ 2.292 mls/hr TPN  CONT IV ;  Start 

3/18/20 at 22:00;  Stop 3/18/20 at 12:33;  Status DC


Info (Tpn Per Pharmacy) 1 each PRN DAILY  PRN MC SEE COMMENTS;  Start 3/18/20 at

12:30;  Status UNV


Sodium Chloride 90 meq/Calcium Gluconate 10 meq/ Multivitamins 10 ml/Chromium/ 

Copper/Manganese/ Seleni/Zn 0.5 ml/ Total Parenteral Nutrition/Amino Acids/D

extrose/ Fat Emulsion Intravenous 1,512 ml @  63 mls/hr TPN  CONT IV  Last 

administered on 3/18/20at 22:06;  Start 3/18/20 at 22:00;  Stop 3/19/20 at 

21:59;  Status DC


Calcium Carbonate/ Glycine (Tums) 500 mg PRN AFTMEALHC  PRN PO INDIGESTION;  

Start 3/18/20 at 17:45;  Stop 5/13/20 at 10:25;  Status DC


Calcium Gluconate (Calcium Gluconate) 2,000 mg 1X  ONCE IVP  Last administered o

n 3/19/20at 02:19;  Start 3/19/20 at 02:15;  Stop 3/19/20 at 02:16;  Status DC


Calcium Chloride 3000 mg/Sodium Chloride 1,030 ml @  50 mls/hr [id number] IV  Last 

administered on 3/21/20at 02:17;  Start 3/19/20 at 08:00;  Stop 3/21/20 at 

15:23;  Status DC


Lorazepam (Ativan Inj) 1 mg PRN Q4HRS  PRN IVP ANXIETY / AGITATION, 2nd choic 

Last administered on 4/17/20at 03:51;  Start 3/19/20 at 09:00;  Stop 4/17/20 at 

09:19;  Status DC


Sodium Chloride 1,000 ml @  1,000 mls/hr Q1H PRN IV hypotension;  Start 3/19/20 

at 08:56;  Stop 3/19/20 at 14:55;  Status DC


Albumin Human 200 ml @  200 mls/hr 1X PRN  PRN IV Hypotension;  Start 3/19/20 at

09:00;  Stop 3/19/20 at 14:59;  Status DC


Diphenhydramine HCl (Benadryl) 25 mg 1X PRN  PRN IV ITCHING;  Start 3/19/20 at 

09:00;  Stop 3/20/20 at 08:59;  Status DC


Diphenhydramine HCl (Benadryl) 25 mg 1X PRN  PRN IV ITCHING;  Start 3/19/20 at 

09:00;  Stop 3/20/20 at 08:59;  Status DC


Sodium Chloride 1,000 ml @  400 mls/hr Q2H30M PRN IV PATENCY;  Start 3/19/20 at 

08:56;  Stop 3/19/20 at 20:55;  Status DC


Info (PHARMACY MONITORING -- do not chart) 1 each PRN DAILY  PRN MC SEE 

COMMENTS;  Start 3/19/20 at 09:00;  Status UNV


Info (PHARMACY MONITORING -- do not chart) 1 each PRN DAILY  PRN MC SEE 

COMMENTS;  Start 3/19/20 at 09:00;  Stop 3/20/20 at 08:13;  Status DC


Digoxin (Lanoxin) 500 mcg 1X  ONCE IV  Last administered on 3/19/20at 10:04;  

Start 3/19/20 at 10:00;  Stop 3/19/20 at 10:01;  Status DC


Digoxin (Lanoxin) 125 mcg 1X  ONCE IV  Last administered on 3/19/20at 17:10;  

Start 3/19/20 at 18:00;  Stop 3/19/20 at 18:01;  Status DC


Magnesium Sulfate 100 ml @  25 mls/hr 1X  ONCE IV  Last administered on 

3/19/20at 12:48;  Start 3/19/20 at 13:00;  Stop 3/19/20 at 16:59;  Status DC


Sodium Chloride 90 meq/Magnesium Sulfate 10 meq/ Calcium Gluconate 20 meq/ 

Multivitamins 10 ml/Chromium/ Copper/Manganese/ Seleni/Zn 0.5 ml/ Total 

Parenteral Nutrition/Amino Acids/Dextrose/ Fat Emulsion Intravenous 1,512 ml @  

63 mls/hr TPN  CONT IV  Last administered on 3/19/20at 22:25;  Start 3/19/20 at 

22:00;  Stop 3/20/20 at 21:59;  Status DC


Sodium Chloride 1,000 ml @  1,000 mls/hr Q1H PRN IV hypotension;  Start 3/20/20 

at 08:05;  Stop 3/20/20 at 14:04;  Status DC


Albumin Human 200 ml @  200 mls/hr 1X  ONCE IV  Last administered on 3/20/20at 

08:57;  Start 3/20/20 at 08:15;  Stop 3/20/20 at 09:14;  Status DC


Diphenhydramine HCl (Benadryl) 25 mg 1X PRN  PRN IV ITCHING;  Start 3/20/20 at 

08:15;  Stop 3/21/20 at 08:14;  Status DC


Diphenhydramine HCl (Benadryl) 25 mg 1X PRN  PRN IV ITCHING;  Start 3/20/20 at 

08:15;  Stop 3/21/20 at 08:14;  Status DC


Sodium Chloride 1,000 ml @  400 mls/hr Q2H30M PRN IV PATENCY;  Start 3/20/20 at 

08:05;  Stop 3/20/20 at 20:04;  Status DC


Info (PHARMACY MONITORING -- do not chart) 1 each PRN DAILY  PRN MC SEE 

COMMENTS;  Start 3/20/20 at 08:15;  Stop 3/24/20 at 07:57;  Status DC


Sodium Chloride 90 meq/Potassium Chloride 15 meq/ Potassium Phosphate 10 mmol/ 

Magnesium Sulfate 10 meq/Calcium Gluconate 20 meq/ Multivitamins 10 ml/Chromium/

Copper/Manganese/ Seleni/Zn 0.5 ml/ Total Parenteral Nutrition/Amino 

Acids/Dextrose/ Fat Emulsion Intravenous 1,512 ml @  63 mls/hr TPN  CONT IV  

Last administered on 3/20/20at 21:01;  Start 3/20/20 at 22:00;  Stop 3/21/20 at 

21:59;  Status DC


Potassium Chloride/Water 100 ml @  100 mls/hr 1X  ONCE IV  Last administered on 

3/20/20at 14:09;  Start 3/20/20 at 14:00;  Stop 3/20/20 at 14:59;  Status DC


Benzocaine (Hurricaine One) 1 spray 1X  ONCE MM  Last administered on 3/20/20at 

16:38;  Start 3/20/20 at 14:30;  Stop 3/20/20 at 14:31;  Status DC


Lidocaine HCl (Glydo (Lidocaine) Jelly) 1 thomas 1X  ONCE MM  Last administered on 

3/20/20at 16:38;  Start 3/20/20 at 14:30;  Stop 3/20/20 at 14:31;  Status DC


Linezolid/Dextrose 300 ml @  300 mls/hr Q12HR IV  Last administered on 3/26/20at

21:04;  Start 3/20/20 at 20:00;  Stop 3/27/20 at 07:50;  Status DC


Acetaminophen (Tylenol) 650 mg PRN Q6HRS  PRN PO MILD PAIN / TEMP;  Start 

3/21/20 at 03:30;  Stop 3/21/20 at 03:36;  Status DC


Acetaminophen (Tylenol) 650 mg PRN Q6HRS  PRN PEG MILD PAIN / TEMP Last 

administered on 4/16/20at 19:56;  Start 3/21/20 at 03:36;  Stop 5/13/20 at 

10:25;  Status DC


Sodium Chloride 1,000 ml @  1,000 mls/hr Q1H PRN IV hypotension;  Start 3/21/20 

at 07:50;  Stop 3/21/20 at 13:49;  Status DC


Albumin Human 200 ml @  200 mls/hr 1X PRN  PRN IV Hypotension;  Start 3/21/20 at

08:00;  Stop 3/21/20 at 13:59;  Status DC


Sodium Chloride (Normal Saline Flush) 10 ml 1X PRN  PRN IV AP catheter pack;  

Start 3/21/20 at 08:00;  Stop 3/22/20 at 07:59;  Status DC


Sodium Chloride (Normal Saline Flush) 10 ml 1X PRN  PRN IV  catheter pack;  

Start 3/21/20 at 08:00;  Stop 3/22/20 at 07:59;  Status DC


Sodium Chloride 1,000 ml @  400 mls/hr Q2H30M PRN IV PATENCY;  Start 3/21/20 at 

07:50;  Stop 3/21/20 at 19:49;  Status DC


Info (PHARMACY MONITORING -- do not chart) 1 each PRN DAILY  PRN MC SEE 

COMMENTS;  Start 3/21/20 at 08:00;  Status UNV


Info (PHARMACY MONITORING -- do not chart) 1 each PRN DAILY  PRN MC SEE CO

MMENTS;  Start 3/21/20 at 08:00;  Stop 3/23/20 at 08:25;  Status DC


Sodium Chloride 90 meq/Potassium Chloride 15 meq/ Potassium Phosphate 10 mmol/ 

Magnesium Sulfate 10 meq/Calcium Gluconate 20 meq/ Multivitamins 10 ml/Chromium/

Copper/Manganese/ Seleni/Zn 0.5 ml/ Total Parenteral Nutrition/Amino 

Acids/Dextrose/ Fat Emulsion Intravenous 1,512 ml @  63 mls/hr TPN  CONT IV  

Last administered on 3/21/20at 20:57;  Start 3/21/20 at 22:00;  Stop 3/22/20 at 

21:59;  Status DC


Sodium Chloride 90 meq/Potassium Chloride 15 meq/ Potassium Phosphate 15 mmol/ 

Magnesium Sulfate 10 meq/Calcium Gluconate 20 meq/ Multivitamins 10 ml/Chromium/

Copper/Manganese/ Seleni/Zn 0.5 ml/ Total Parenteral Nutrition/Amino 

Acids/Dextrose/ Fat Emulsion Intravenous 1,512 ml @  63 mls/hr TPN  CONT IV ;  

Start 3/22/20 at 22:00;  Stop 3/22/20 at 14:16;  Status DC


Sodium Chloride 90 meq/Potassium Chloride 15 meq/ Potassium Phosphate 15 mmol/ 

Magnesium Sulfate 10 meq/Calcium Gluconate 20 meq/ Multivitamins 10 ml/Chromium/

Copper/Manganese/ Seleni/Zn 0.5 ml/ Total Parenteral Nutrition/Amino 

Acids/Dextrose/ Fat Emulsion Intravenous 1,200 ml @  50 mls/hr TPN  CONT IV ;  

Start 3/22/20 at 22:00;  Stop 3/22/20 at 14:17;  Status DC


Sodium Chloride 90 meq/Potassium Chloride 15 meq/ Potassium Phosphate 10 mmol/ 

Magnesium Sulfate 10 meq/Calcium Gluconate 20 meq/ Multivitamins 10 ml/Chromium/

Copper/Manganese/ Seleni/Zn 0.5 ml/ Total Parenteral Nutrition/Amino 

Acids/Dextrose/ Fat Emulsion Intravenous 1,200 ml @  50 mls/hr TPN  CONT IV  

Last administered on 3/22/20at 23:29;  Start 3/22/20 at 22:00;  Stop 3/23/20 at 

21:59;  Status DC


Sodium Chloride 1,000 ml @  1,000 mls/hr Q1H PRN IV hypotension;  Start 3/23/20 

at 07:28;  Stop 3/23/20 at 13:27;  Status DC


Albumin Human 200 ml @  200 mls/hr 1X  ONCE IV  Last administered on 3/23/20at 

08:51;  Start 3/23/20 at 07:30;  Stop 3/23/20 at 08:29;  Status DC


Diphenhydramine HCl (Benadryl) 25 mg 1X PRN  PRN IV ITCHING;  Start 3/23/20 at 

07:30;  Stop 3/24/20 at 07:29;  Status DC


Diphenhydramine HCl (Benadryl) 25 mg 1X PRN  PRN IV ITCHING;  Start 3/23/20 at 

07:30;  Stop 3/24/20 at 07:29;  Status DC


Sodium Chloride 1,000 ml @  400 mls/hr Q2H30M PRN IV PATENCY;  Start 3/23/20 at 

07:28;  Stop 3/23/20 at 19:27;  Status DC


Info (PHARMACY MONITORING -- do not chart) 1 each PRN DAILY  PRN MC SEE 

COMMENTS;  Start 3/23/20 at 07:30;  Stop 4/3/20 at 13:01;  Status DC


Metronidazole 100 ml @  100 mls/hr Q6HRS IV  Last administered on 4/8/20at 

06:26;  Start 3/23/20 at 08:30;  Stop 4/8/20 at 09:58;  Status DC


Micafungin Sodium 100 mg/Dextrose 100 ml @  100 mls/hr Q24H IV  Last 

administered on 4/30/20at 08:18;  Start 3/23/20 at 09:00;  Stop 4/30/20 at 

20:58;  Status DC


Propofol 0 ml @ As Directed STK-MED ONCE IV ;  Start 3/23/20 at 07:53;  Stop 

3/23/20 at 07:53;  Status DC


Etomidate (Amidate) 20 mg STK-MED ONCE IV ;  Start 3/23/20 at 07:53;  Stop 

3/23/20 at 07:54;  Status DC


Midazolam HCl (Versed) 5 mg STK-MED ONCE .ROUTE ;  Start 3/23/20 at 07:57;  Stop

3/23/20 at 07:57;  Status DC


Fentanyl Citrate 30 ml @ 0 mls/hr CONT  PRN IV SEE PROTOCOL Last administered on

4/17/20at 06:12;  Start 3/23/20 at 08:15;  Stop 4/17/20 at 09:19;  Status DC


Artificial Tears (Artificial Tears) 1 drop PRN Q1HR  PRN OU DRY EYE, 1st choice;

 Start 3/23/20 at 08:15;  Stop 4/29/20 at 05:31;  Status DC


Midazolam HCl 50 mg/Sodium Chloride 50 ml @ 0 mls/hr CONT  PRN IV SEE PROTOCOL 

Last administered on 3/26/20at 22:39;  Start 3/23/20 at 08:15;  Stop 3/28/20 at 

15:59;  Status DC


Etomidate (Amidate) 8 mg 1X  ONCE IV  Last administered on 3/23/20at 08:33;  

Start 3/23/20 at 08:30;  Stop 3/23/20 at 08:31;  Status DC


Succinylcholine Chloride (Anectine) 120 mg 1X  ONCE IV  Last administered on 

3/23/20at 08:34;  Start 3/23/20 at 08:30;  Stop 3/23/20 at 08:31;  Status DC


Midazolam HCl (Versed) 5 mg 1X  ONCE IV ;  Start 3/23/20 at 08:30;  Stop 3/23/20

at 08:31;  Status DC


Potassium Chloride 15 meq/ Bicarbonate Dialysis Soln w/ out KCl 5,007.5 ml  @ 

1,000 mls/ hr Q5H1M IV  Last administered on 3/24/20at 11:11;  Start 3/23/20 at 

12:00;  Stop 3/24/20 at 11:15;  Status DC


Potassium Chloride 15 meq/ Bicarbonate Dialysis Soln w/ out KCl 5,007.5 ml  @ 

1,000 mls/ hr Q5H1M IV  Last administered on 3/24/20at 11:12;  Start 3/23/20 at 

12:00;  Stop 3/24/20 at 11:17;  Status DC


Potassium Chloride 15 meq/ Bicarbonate Dialysis Soln w/ out KCl 5,007.5 ml  @ 

1,000 mls/ hr Q5H1M IV  Last administered on 3/24/20at 11:11;  Start 3/23/20 at 

12:00;  Stop 3/24/20 at 11:19;  Status DC


Sodium Chloride 90 meq/Potassium Chloride 15 meq/ Potassium Phosphate 10 mmol/ 

Magnesium Sulfate 10 meq/Calcium Gluconate 20 meq/ Multivitamins 10 ml/Chromium/

Copper/Manganese/ Seleni/Zn 0.5 ml/ Total Parenteral Nutrition/Amino 

Acids/Dextrose/ Fat Emulsion Intravenous 1,400 ml @  58.333 mls/ hr TPN  CONT IV

 Last administered on 3/23/20at 21:42;  Start 3/23/20 at 22:00;  Stop 3/24/20 at

21:59;  Status DC


Heparin Sodium (Porcine) (Heparin Sodium) 5,000 unit Q8HRS SQ  Last administered

on 3/28/20at 05:55;  Start 3/23/20 at 15:00;  Stop 3/28/20 at 13:28;  Status DC


Meropenem 500 mg/ Sodium Chloride 50 ml @  100 mls/hr Q6HRS IV  Last admi

nistered on 3/25/20at 06:00;  Start 3/24/20 at 09:00;  Stop 3/25/20 at 07:29;  

Status DC


Potassium Phosphate 20 mmol/ Sodium Chloride 106.6667 ml @  51.667 m... 1X  ONCE

IV  Last administered on 3/24/20at 11:22;  Start 3/24/20 at 10:15;  Stop 3/24/20

at 12:18;  Status DC


Acetaminophen (Tylenol Supp) 650 mg PRN Q6HRS  PRN MD MILD PAIN / TEMP > 100.3'F

Last administered on 6/29/20at 18:16;  Start 3/24/20 at 10:30


Potassium Chloride/Water 100 ml @  100 mls/hr Q1H IV  Last administered on 

3/24/20at 12:12;  Start 3/24/20 at 11:00;  Stop 3/24/20 at 12:59;  Status DC


Potassium Chloride 20 meq/ Bicarbonate Dialysis Soln w/ out KCl 5,010 ml @  

1,000 mls/hr Q5H1M IV  Last administered on 3/25/20at 08:48;  Start 3/24/20 at 

12:00;  Stop 3/25/20 at 13:03;  Status DC


Potassium Chloride 20 meq/ Bicarbonate Dialysis Soln w/ out KCl 5,010 ml @  

1,000 mls/hr Q5H1M IV  Last administered on 3/29/20at 14:52;  Start 3/24/20 at 

11:30;  Stop 3/29/20 at 19:59;  Status DC


Potassium Chloride 20 meq/ Bicarbonate Dialysis Soln w/ out KCl 5,010 ml @  

1,000 mls/hr Q5H1M IV  Last administered on 3/29/20at 14:53;  Start 3/24/20 at 

11:30;  Stop 3/29/20 at 19:59;  Status DC


Sodium Chloride 90 meq/Potassium Chloride 15 meq/ Potassium Phosphate 15 mmol/ 

Magnesium Sulfate 10 meq/Calcium Gluconate 15 meq/ Multivitamins 10 ml/Chromium/

Copper/Manganese/ Seleni/Zn 0.5 ml/ Total Parenteral Nutrition/Amino 

Acids/Dextrose/ Fat Emulsion Intravenous 1,400 ml @  58.333 mls/ hr TPN  CONT IV

 Last administered on 3/24/20at 22:17;  Start 3/24/20 at 22:00;  Stop 3/25/20 at

21:59;  Status DC


Cefepime HCl (Maxipime) 2 gm Q12HR IVP  Last administered on 4/7/20at 20:56;  

Start 3/25/20 at 09:00;  Stop 4/8/20 at 09:58;  Status DC


Daptomycin 500 mg/ Sodium Chloride 50 ml @  100 mls/hr Q48H IV  Last 

administered on 4/10/20at 09:57;  Start 3/25/20 at 08:30;  Stop 4/10/20 at 

10:07;  Status DC


Lidocaine HCl (Buffered Lidocaine 1%) 3 ml 1X  ONCE INJ  Last administered on 

3/25/20at 10:27;  Start 3/25/20 at 10:30;  Stop 3/25/20 at 10:31;  Status DC


Potassium Phosphate 20 mmol/ Sodium Chloride 106.6667 ml @  51.667 m... 1X  ONCE

IV  Last administered on 3/25/20at 12:51;  Start 3/25/20 at 13:00;  Stop 3/25/20

at 15:03;  Status DC


Sodium Chloride 90 meq/Potassium Chloride 15 meq/ Potassium Phosphate 18 mmol/ 

Magnesium Sulfate 8 meq/Calcium Gluconate 15 meq/ Multivitamins 10 ml/Chromium/ 

Copper/Manganese/ Seleni/Zn 0.5 ml/ Total Parenteral Nutrition/Amino A

cids/Dextrose/ Fat Emulsion Intravenous 1,400 ml @  58.333 mls/ hr TPN  CONT IV 

Last administered on 3/25/20at 22:16;  Start 3/25/20 at 22:00;  Stop 3/26/20 at 

21:59;  Status DC


Potassium Chloride 20 meq/ Bicarbonate Dialysis Soln w/ out KCl 5,010 ml @  1

,000 mls/hr Q5H1M IV  Last administered on 3/29/20at 14:54;  Start 3/25/20 at 

16:00;  Stop 3/29/20 at 19:59;  Status DC


Multi-Ingred Cream/Lotion/Oil/ Oint (Artificial Tears Eye Ointment) 1 thomas PRN 

Q1HR  PRN OU DRY EYE, 2nd choice Last administered on 4/13/20at 08:19;  Start 

3/25/20 at 17:30;  Stop 6/3/20 at 14:39;  Status DC


Sodium Chloride 90 meq/Potassium Chloride 15 meq/ Potassium Phosphate 18 mmol/ 

Magnesium Sulfate 8 meq/Calcium Gluconate 15 meq/ Multivitamins 10 ml/Chromium/ 

Copper/Manganese/ Seleni/Zn 0.5 ml/ Total Parenteral Nutrition/Amino 

Acids/Dextrose/ Fat Emulsion Intravenous 1,400 ml @  58.333 mls/ hr TPN  CONT IV

 Last administered on 3/26/20at 22:00;  Start 3/26/20 at 22:00;  Stop 3/27/20 at

21:59;  Status DC


Albumin Human 500 ml @  125 mls/hr 1X  ONCE IV ;  Start 3/26/20 at 14:15;  Stop 

3/26/20 at 18:14;  Status DC


Sodium Chloride 90 meq/Potassium Chloride 15 meq/ Potassium Phosphate 18 mmol/ 

Magnesium Sulfate 8 meq/Calcium Gluconate 15 meq/ Multivitamins 10 ml/Chromium/ 

Copper/Manganese/ Seleni/Zn 0.5 ml/ Insulin Human Regular 10 unit/ Total 

Parenteral Nutrition/Amino Acids/Dextrose/ Fat Emulsion Intravenous 1,400 ml @  

58.333 mls/ hr TPN  CONT IV  Last administered on 3/27/20at 21:43;  Start 

3/27/20 at 22:00;  Stop 3/28/20 at 21:59;  Status DC


Lidocaine HCl (Buffered Lidocaine 1%) 3 ml STK-MED ONCE .ROUTE ;  Start 3/25/20 

at 10:00;  Stop 3/27/20 at 13:57;  Status DC


Midazolam HCl 100 mg/Sodium Chloride 100 ml @ 7 mls/hr CONT  PRN IV SEE PROTOCOL

Last administered on 4/8/20at 15:35;  Start 3/28/20 at 16:00;  Stop 6/3/20 at 

14:38;  Status DC


Sodium Chloride 90 meq/Potassium Chloride 15 meq/ Potassium Phosphate 18 mmol/ 

Magnesium Sulfate 8 meq/Calcium Gluconate 15 meq/ Multivitamins 10 ml/Chromium/ 

Copper/Manganese/ Seleni/Zn 0.5 ml/ Insulin Human Regular 15 unit/ Total 

Parenteral Nutrition/Amino Acids/Dextrose/ Fat Emulsion Intravenous 1,400 ml @  

58.333 mls/ hr TPN  CONT IV  Last administered on 3/28/20at 20:34;  Start 

3/28/20 at 22:00;  Stop 3/29/20 at 21:59;  Status DC


Info ([facility] Electrolyte Protocol) 1 ea CONT PRN  PRN MC PER PROTOCOL;  Start 

3/29/20 at 13:15


Sodium Chloride 90 meq/Potassium Chloride 15 meq/ Potassium Phosphate 18 mmol/ 

Magnesium Sulfate 8 meq/Calcium Gluconate 15 meq/ Multivitamins 10 ml/Chromium/ 

Copper/Manganese/ Seleni/Zn 0.5 ml/ Insulin Human Regular 15 unit/ Total 

Parenteral Nutrition/Amino Acids/Dextrose/ Fat Emulsion Intravenous 1,400 ml @  

58.333 mls/ hr TPN  CONT IV  Last administered on 3/29/20at 22:05;  Start 

3/29/20 at 22:00;  Stop 3/30/20 at 21:59;  Status DC


Potassium Chloride 15 meq/ Bicarbonate Dialysis Soln w/ out KCl 5,007.5 ml  @ 

1,000 mls/ hr Q5H1M IV  Last administered on 4/1/20at 18:14;  Start 3/29/20 at 

20:00;  Stop 4/2/20 at 13:08;  Status DC


Potassium Chloride 15 meq/ Bicarbonate Dialysis Soln w/ out KCl 5,007.5 ml  @ 

1,000 mls/ hr Q5H1M IV  Last administered on 4/1/20at 18:14;  Start 3/29/20 at 

20:00;  Stop 4/2/20 at 13:08;  Status DC


Potassium Chloride 15 meq/ Bicarbonate Dialysis Soln w/ out KCl 5,007.5 ml  @ 

1,000 mls/ hr Q5H1M IV  Last administered on 4/1/20at 18:14;  Start 3/29/20 at 

20:00;  Stop 4/2/20 at 13:08;  Status DC


Iohexol (Omnipaque 240 Mg/ml) 30 ml 1X  ONCE PO  Last administered on 3/30/20at 

11:30;  Start 3/30/20 at 11:30;  Stop 3/30/20 at 11:33;  Status DC


Info (CONTRAST GIVEN -- Rx MONITORING) 1 each PRN DAILY  PRN MC SEE COMMENTS;  

Start 3/30/20 at 11:45;  Stop 4/1/20 at 11:44;  Status DC


Sodium Chloride 90 meq/Potassium Chloride 15 meq/ Potassium Phosphate 18 mmol/ 

Magnesium Sulfate 8 meq/Calcium Gluconate 15 meq/ Multivitamins 10 ml/Chromium/ 

Copper/Manganese/ Seleni/Zn 0.5 ml/ Insulin Human Regular 15 unit/ Total 

Parenteral Nutrition/Amino Acids/Dextrose/ Fat Emulsion Intravenous 1,400 ml @  

58.333 mls/ hr TPN  CONT IV  Last administered on 3/30/20at 21:47;  Start 

3/30/20 at 22:00;  Stop 3/31/20 at 21:59;  Status DC


Sodium Chloride 90 meq/Potassium Chloride 15 meq/ Potassium Phosphate 18 mmol/ 

Magnesium Sulfate 8 meq/Calcium Gluconate 15 meq/ Multivitamins 10 ml/Chromium/ 

Copper/Manganese/ Seleni/Zn 0.5 ml/ Insulin Human Regular 20 unit/ Total 

Parenteral Nutrition/Amino Acids/Dextrose/ Fat Emulsion Intravenous 1,400 ml @  

58.333 mls/ hr TPN  CONT IV  Last administered on 3/31/20at 21:36;  Start 

3/31/20 at 22:00;  Stop 4/1/20 at 21:59;  Status DC


Alteplase, Recombinant (Cathflo For Central Catheter Clearance) 1 mg 1X  ONCE 

INT CAT  Last administered on 3/31/20at 20:03;  Start 3/31/20 at 19:30;  Stop 

3/31/20 at 19:46;  Status DC


Alteplase, Recombinant (Cathflo For Central Catheter Clearance) 1 mg 1X  ONCE 

INT CAT  Last administered on 3/31/20at 22:05;  Start 3/31/20 at 22:00;  Stop 

3/31/20 at 22:01;  Status DC


Sodium Chloride 90 meq/Potassium Chloride 15 meq/ Potassium Phosphate 18 mmol/ 

Magnesium Sulfate 8 meq/Calcium Gluconate 15 meq/ Multivitamins 10 ml/Chromium/ 

Copper/Manganese/ Seleni/Zn 0.5 ml/ Insulin Human Regular 20 unit/ Total 

Parenteral Nutrition/Amino Acids/Dextrose/ Fat Emulsion Intravenous 1,400 ml @  

58.333 mls/ hr TPN  CONT IV  Last administered on 4/1/20at 21:30;  Start 4/1/20 

at 22:00;  Stop 4/2/20 at 21:59;  Status DC


Dexmedetomidine HCl 400 mcg/ Sodium Chloride 100 ml @ 0 mls/hr CONT  PRN IV 

ANXIETY / AGITATION Last administered on 5/30/20at 12:57;  Start 4/2/20 at 

08:15;  Stop 5/30/20 at 18:31;  Status DC


Sodium Chloride 500 ml @  500 mls/hr 1X PRN  PRN IV ELEVATED BP, SEE COMMENTS;  

Start 4/2/20 at 08:15


Atropine Sulfate (ATROPINE 0.5mg SYRINGE) 0.5 mg PRN Q5MIN  PRN IV SEE COMMENTS;

 Start 4/2/20 at 08:15


Furosemide (Lasix) 20 mg 1X  ONCE IVP  Last administered on 4/2/20at 08:19;  

Start 4/2/20 at 08:15;  Stop 4/2/20 at 08:16;  Status DC


Lidocaine HCl (Buffered Lidocaine 1%) 3 ml STK-MED ONCE .ROUTE ;  Start 4/2/20 

at 08:39;  Stop 4/2/20 at 08:39;  Status DC


Lidocaine HCl (Buffered Lidocaine 1%) 6 ml 1X  ONCE INJ  Last administered on 

4/2/20at 09:05;  Start 4/2/20 at 09:00;  Stop 4/2/20 at 09:06;  Status DC


Sodium Chloride 90 meq/Potassium Chloride 15 meq/ Potassium Phosphate 18 mmol/ 

Magnesium Sulfate 8 meq/Calcium Gluconate 15 meq/ Multivitamins 10 ml/Chromium/ 

Copper/Manganese/ Seleni/Zn 0.5 ml/ Insulin Human Regular 20 unit/ Total 

Parenteral Nutrition/Amino Acids/Dextrose/ Fat Emulsion Intravenous 1,400 ml @  

58.333 mls/ hr TPN  CONT IV  Last administered on 4/2/20at 22:45;  Start 4/2/20 

at 22:00;  Stop 4/3/20 at 21:59;  Status DC


Sodium Chloride 1,000 ml @  1,000 mls/hr Q1H PRN IV hypotension;  Start 4/3/20 

at 07:30;  Stop 4/3/20 at 13:29;  Status DC


Albumin Human 200 ml @  200 mls/hr 1X PRN  PRN IV Hypotension Last administered 

on 4/3/20at 09:36;  Start 4/3/20 at 07:30;  Stop 4/3/20 at 13:29;  Status DC


Sodium Chloride (Normal Saline Flush) 10 ml 1X PRN  PRN IV AP catheter pack;  

Start 4/3/20 at 07:30;  Stop 4/3/20 at 21:29;  Status DC


Sodium Chloride (Normal Saline Flush) 10 ml 1X PRN  PRN IV  catheter pack;  

Start 4/3/20 at 07:30;  Stop 4/4/20 at 07:29;  Status DC


Sodium Chloride 1,000 ml @  400 mls/hr Q2H30M PRN IV PATENCY;  Start 4/3/20 at 

07:30;  Stop 4/3/20 at 19:29;  Status DC


Info (PHARMACY MONITORING -- do not chart) 1 each PRN DAILY  PRN MC SEE 

COMMENTS;  Start 4/3/20 at 07:30;  Stop 4/3/20 at 13:02;  Status DC


Info (PHARMACY MONITORING -- do not chart) 1 each PRN DAILY  PRN MC SEE 

COMMENTS;  Start 4/3/20 at 07:30;  Stop 4/5/20 at 12:45;  Status DC


Sodium Chloride 90 meq/Potassium Chloride 15 meq/ Potassium Phosphate 10 mmol/ 

Magnesium Sulfate 8 meq/Calcium Gluconate 15 meq/ Multivitamins 10 ml/Chromium/ 

Copper/Manganese/ Seleni/Zn 0.5 ml/ Insulin Human Regular 25 unit/ Total 

Parenteral Nutrition/Amino Acids/Dextrose/ Fat Emulsion Intravenous 1,400 ml @  

58.333 mls/ hr TPN  CONT IV  Last administered on 4/3/20at 22:19;  Start 4/3/20 

at 22:00;  Stop 4/4/20 at 21:59;  Status DC


Heparin Sodium (Porcine) (Heparin Sodium) 5,000 unit Q12HR SQ  Last administered

on 4/26/20at 08:59;  Start 4/3/20 at 21:00;  Stop 4/26/20 at 10:05;  Status DC


Ondansetron HCl (Zofran) 4 mg PRN Q6HRS  PRN IV NAUSEA/VOMITING;  Start 4/6/20 

at 07:00;  Stop 4/7/20 at 06:59;  Status DC


Fentanyl Citrate (Fentanyl 2ml Vial) 25 mcg PRN Q5MIN  PRN IV MILD PAIN 1-3;  

Start 4/6/20 at 07:00;  Stop 4/7/20 at 06:59;  Status DC


Fentanyl Citrate (Fentanyl 2ml Vial) 50 mcg PRN Q5MIN  PRN IV MODERATE TO SEVERE

PAIN;  Start 4/6/20 at 07:00;  Stop 4/7/20 at 06:59;  Status DC


Ringer's Solution 1,000 ml @  30 mls/hr Q24H IV ;  Start 4/6/20 at 07:00;  Stop 

4/6/20 at 18:59;  Status DC


Lidocaine HCl (Xylocaine-Mpf 1% 2ml Vial) 2 ml PRN 1X  PRN ID PRIOR TO IV START;

 Start 4/6/20 at 07:00;  Stop 4/7/20 at 06:59;  Status DC


Prochlorperazine Edisylate (Compazine) 5 mg PACU PRN  PRN IV NAUSEA, MRX1;  

Start 4/6/20 at 07:00;  Stop 4/7/20 at 06:59;  Status DC


Sodium Chloride 1,000 ml @  1,000 mls/hr Q1H PRN IV hypotension;  Start 4/4/20 

at 09:10;  Stop 4/4/20 at 15:09;  Status DC


Albumin Human 200 ml @  200 mls/hr 1X PRN  PRN IV Hypotension Last administered 

on 4/4/20at 10:10;  Start 4/4/20 at 09:15;  Stop 4/4/20 at 15:14;  Status DC


Sodium Chloride 1,000 ml @  400 mls/hr Q2H30M PRN IV PATENCY;  Start 4/4/20 at 

09:10;  Stop 4/4/20 at 21:09;  Status DC


Info (PHARMACY MONITORING -- do not chart) 1 each PRN DAILY  PRN MC SEE 

COMMENTS;  Start 4/4/20 at 09:15;  Stop 4/5/20 at 12:45;  Status DC


Info (PHARMACY MONITORING -- do not chart) 1 each PRN DAILY  PRN MC SEE 

COMMENTS;  Start 4/4/20 at 09:15;  Stop 4/5/20 at 12:45;  Status DC


Sodium Chloride 90 meq/Potassium Chloride 15 meq/ Potassium Phosphate 10 mmol/ 

Magnesium Sulfate 8 meq/Calcium Gluconate 15 meq/ Multivitamins 10 ml/Chromium/ 

Copper/Manganese/ Seleni/Zn 0.5 ml/ Insulin Human Regular 25 unit/ Total 

Parenteral Nutrition/Amino Acids/Dextrose/ Fat Emulsion Intravenous 1,400 ml @  

58.333 mls/ hr TPN  CONT IV  Last administered on 4/4/20at 22:10;  Start 4/4/20 

at 22:00;  Stop 4/5/20 at 21:59;  Status DC


Magnesium Sulfate 50 ml @ 25 mls/hr PRN DAILY  PRN IV for Mag < 1.7 on am labs 

Last administered on 6/18/20at 10:57;  Start 4/5/20 at 09:15


Sodium Chloride 90 meq/Potassium Chloride 15 meq/ Potassium Phosphate 10 mmol/ 

Magnesium Sulfate 8 meq/Calcium Gluconate 15 meq/ Multivitamins 10 ml/Chromium/ 

Copper/Manganese/ Seleni/Zn 0.5 ml/ Insulin Human Regular 25 unit/ Total 

Parenteral Nutrition/Amino Acids/Dextrose/ Fat Emulsion Intravenous 1,400 ml @  

58.333 mls/ hr TPN  CONT IV  Last administered on 4/5/20at 21:20;  Start 4/5/20 

at 22:00;  Stop 4/6/20 at 21:59;  Status DC


Sodium Chloride 1,000 ml @  1,000 mls/hr Q1H PRN IV hypotension;  Start 4/5/20 

at 12:23;  Stop 4/5/20 at 18:22;  Status DC


Albumin Human 200 ml @  200 mls/hr 1X  ONCE IV  Last administered on 4/5/20at 

13:34;  Start 4/5/20 at 12:30;  Stop 4/5/20 at 13:29;  Status DC


Diphenhydramine HCl (Benadryl) 25 mg 1X PRN  PRN IV ITCHING;  Start 4/5/20 at 

12:30;  Stop 4/6/20 at 12:29;  Status DC


Diphenhydramine HCl (Benadryl) 25 mg 1X PRN  PRN IV ITCHING;  Start 4/5/20 at 

12:30;  Stop 4/6/20 at 12:29;  Status DC


Info (PHARMACY MONITORING -- do not chart) 1 each PRN DAILY  PRN MC SEE 

COMMENTS;  Start 4/5/20 at 12:30;  Status Cancel


Bupivacaine HCl/ Epinephrine Bitart (Sensorcain-Epi 0.5%-1:[id number] Mpf) 30 ml 

STK-MED ONCE .ROUTE  Last administered on 4/6/20at 11:44;  Start 4/6/20 at 

11:00;  Stop 4/6/20 at 11:01;  Status DC


Cellulose (Surgicel Fibrillar 1x2) 1 each STK-MED ONCE .ROUTE ;  Start 4/6/20 at

11:00;  Stop 4/6/20 at 11:01;  Status DC


Sodium Chloride 90 meq/Potassium Chloride 15 meq/ Potassium Phosphate 10 mmol/ 

Magnesium Sulfate 12 meq/Calcium Gluconate 15 meq/ Multivitamins 10 ml/Chromium/

Copper/Manganese/ Seleni/Zn 0.5 ml/ Insulin Human Regular 25 unit/ Total 

Parenteral Nutrition/Amino Acids/Dextrose/ Fat Emulsion Intravenous 1,400 ml @  

58.333 mls/ hr TPN  CONT IV  Last administered on 4/6/20at 22:24;  Start 4/6/20 

at 22:00;  Stop 4/7/20 at 21:59;  Status DC


Propofol 20 ml @ As Directed STK-MED ONCE IV ;  Start 4/6/20 at 11:07;  Stop 

4/6/20 at 11:07;  Status DC


Cellulose (Surgicel Hemostat 4x8) 1 each STK-MED ONCE .ROUTE  Last administered 

on 4/6/20at 11:44;  Start 4/6/20 at 11:55;  Stop 4/6/20 at 11:56;  Status DC


Sevoflurane (Ultane) 60 ml STK-MED ONCE IH ;  Start 4/6/20 at 12:46;  Stop 

4/6/20 at 12:46;  Status DC


Sodium Chloride 1,000 ml @  1,000 mls/hr Q1H PRN IV hypotension;  Start 4/6/20 

at 13:51;  Stop 4/6/20 at 19:50;  Status DC


Albumin Human 200 ml @  200 mls/hr 1X PRN  PRN IV Hypotension Last administered 

on 4/6/20at 14:51;  Start 4/6/20 at 14:00;  Stop 4/6/20 at 19:59;  Status DC


Diphenhydramine HCl (Benadryl) 25 mg 1X PRN  PRN IV ITCHING;  Start 4/6/20 at 

14:00;  Stop 4/7/20 at 13:59;  Status DC


Diphenhydramine HCl (Benadryl) 25 mg 1X PRN  PRN IV ITCHING;  Start 4/6/20 at 

14:00;  Stop 4/7/20 at 13:59;  Status DC


Sodium Chloride 1,000 ml @  400 mls/hr Q2H30M PRN IV PATENCY;  Start 4/6/20 at 

13:51;  Stop 4/7/20 at 01:50;  Status DC


Info (PHARMACY MONITORING -- do not chart) 1 each PRN DAILY  PRN MC SEE 

COMMENTS;  Start 4/6/20 at 14:00;  Stop 4/9/20 at 08:16;  Status DC


Heparin Sodium (Porcine) (Hep Lock Adult) 500 unit STK-MED ONCE IVP ;  Start 

4/7/20 at 09:29;  Stop 4/7/20 at 09:30;  Status DC


Sodium Chloride 1,000 ml @  1,000 mls/hr Q1H PRN IV hypotension;  Start 4/7/20 

at 10:43;  Stop 4/7/20 at 16:42;  Status DC


Sodium Chloride 1,000 ml @  400 mls/hr Q2H30M PRN IV PATENCY;  Start 4/7/20 at 

10:43;  Stop 4/7/20 at 22:42;  Status DC


Info (PHARMACY MONITORING -- do not chart) 1 each PRN DAILY  PRN MC SEE 

COMMENTS;  Start 4/7/20 at 10:45;  Status UNV


Info (PHARMACY MONITORING -- do not chart) 1 each PRN DAILY  PRN MC SEE 

COMMENTS;  Start 4/7/20 at 10:45;  Status UNV


Sodium Chloride 90 meq/Potassium Chloride 15 meq/ Magnesium Sulfate 12 

meq/Calcium Gluconate 15 meq/ Multivitamins 10 ml/Chromium/ Copper/Manganese/ 

Seleni/Zn 0.5 ml/ Insulin Human Regular 25 unit/ Total Parenteral 

Nutrition/Amino Acids/Dextrose/ Fat Emulsion Intravenous 1,400 ml @  58.333 mls/

hr TPN  CONT IV  Last administered on 4/7/20at 22:13;  Start 4/7/20 at 22:00;  

Stop 4/8/20 at 21:59;  Status DC


Sodium Chloride 1,000 ml @  1,000 mls/hr Q1H PRN IV hypotension;  Start 4/8/20 

at 07:50;  Stop 4/8/20 at 13:49;  Status DC


Albumin Human 200 ml @  200 mls/hr 1X  ONCE IV ;  Start 4/8/20 at 08:00;  Stop 

4/8/20 at 08:53;  Status DC


Diphenhydramine HCl (Benadryl) 25 mg 1X PRN  PRN IV ITCHING;  Start 4/8/20 at 

08:00;  Stop 4/9/20 at 07:59;  Status DC


Diphenhydramine HCl (Benadryl) 25 mg 1X PRN  PRN IV ITCHING;  Start 4/8/20 at 

08:00;  Stop 4/9/20 at 07:59;  Status DC


Info (PHARMACY MONITORING -- do not chart) 1 each PRN DAILY  PRN MC SEE COMMENT

S;  Start 4/8/20 at 08:00;  Stop 4/9/20 at 08:16;  Status DC


Albumin Human 50 ml @ 50 mls/hr 1X  ONCE IV ;  Start 4/8/20 at 08:53;  Stop 

4/8/20 at 08:56;  Status DC


Albumin Human 200 ml @  50 mls/hr PRN 1X  PRN IV HYPOTENSION Last administered 

on 4/14/20at 11:54;  Start 4/8/20 at 09:00;  Stop 5/21/20 at 11:14;  Status DC


Meropenem 500 mg/ Sodium Chloride 50 ml @  100 mls/hr Q12H IV  Last administered

on 4/28/20at 10:45;  Start 4/8/20 at 10:00;  Stop 4/28/20 at 12:37;  Status DC


Sodium Chloride 90 meq/Magnesium Sulfate 12 meq/ Calcium Gluconate 15 meq/ 

Multivitamins 10 ml/Chromium/ Copper/Manganese/ Seleni/Zn 0.5 ml/ Insulin Human 

Regular 25 unit/ Total Parenteral Nutrition/Amino Acids/Dextrose/ Fat Emulsion 

Intravenous 1,400 ml @  58.333 mls/ hr TPN  CONT IV  Last administered on 

4/8/20at 21:41;  Start 4/8/20 at 22:00;  Stop 4/9/20 at 21:59;  Status DC


Sodium Chloride 1,000 ml @  1,000 mls/hr Q1H PRN IV hypotension;  Start 4/9/20 

at 07:58;  Stop 4/9/20 at 13:57;  Status DC


Albumin Human 200 ml @  200 mls/hr 1X PRN  PRN IV Hypotension Last administered 

on 4/9/20at 09:30;  Start 4/9/20 at 08:00;  Stop 4/9/20 at 13:59;  Status DC


Sodium Chloride 1,000 ml @  400 mls/hr Q2H30M PRN IV PATENCY;  Start 4/9/20 at 

07:58;  Stop 4/9/20 at 19:57;  Status DC


Info (PHARMACY MONITORING -- do not chart) 1 each PRN DAILY  PRN MC SEE 

COMMENTS;  Start 4/9/20 at 08:00;  Status Cancel


Info (PHARMACY MONITORING -- do not chart) 1 each PRN DAILY  PRN MC SEE 

COMMENTS;  Start 4/9/20 at 08:15;  Status UNV


Sodium Chloride 90 meq/Potassium Phosphate 5 mmol/ Magnesium Sulfate 12 

meq/Calcium Gluconate 15 meq/ Multivitamins 10 ml/Chromium/ Copper/Manganese/ 

Seleni/Zn 0.5 ml/ Insulin Human Regular 30 unit/ Total Parenteral 

Nutrition/Amino Acids/Dextrose/ Fat Emulsion Intravenous 1,400 ml @  58.333 mls/

hr TPN  CONT IV  Last administered on 4/9/20at 22:08;  Start 4/9/20 at 22:00;  

Stop 4/10/20 at 21:59;  Status DC


Linezolid/Dextrose 300 ml @  300 mls/hr Q12HR IV  Last administered on 4/20/20at

20:40;  Start 4/10/20 at 11:00;  Stop 4/21/20 at 08:10;  Status DC


Sodium Chloride 90 meq/Potassium Phosphate 15 mmol/ Magnesium Sulfate 12 

meq/Calcium Gluconate 15 meq/ Multivitamins 10 ml/Chromium/ Copper/Manganese/ 

Seleni/Zn 0.5 ml/ Insulin Human Regular 30 unit/ Total Parenteral 

Nutrition/Amino Acids/Dextrose/ Fat Emulsion Intravenous 1,400 ml @  58.333 mls/

hr TPN  CONT IV  Last administered on 4/10/20at 21:49;  Start 4/10/20 at 22:00; 

Stop 4/11/20 at 21:59;  Status DC


Sodium Chloride 90 meq/Potassium Phosphate 15 mmol/ Magnesium Sulfate 12 

meq/Calcium Gluconate 15 meq/ Multivitamins 10 ml/Chromium/ Copper/Manganese/ 

Seleni/Zn 0.5 ml/ Insulin Human Regular 40 unit/ Total Parenteral 

Nutrition/Amino Acids/Dextrose/ Fat Emulsion Intravenous 1,400 ml @  58.333 mls/

hr TPN  CONT IV  Last administered on 4/11/20at 21:21;  Start 4/11/20 at 22:00; 

Stop 4/12/20 at 21:59;  Status DC


Sodium Chloride 1,000 ml @  1,000 mls/hr Q1H PRN IV hypotension;  Start 4/11/20 

at 13:26;  Stop 4/11/20 at 19:25;  Status DC


Albumin Human 200 ml @  200 mls/hr 1X PRN  PRN IV Hypotension Last administered 

on 4/11/20at 15:00;  Start 4/11/20 at 13:30;  Stop 4/11/20 at 19:29;  Status DC


Sodium Chloride (Normal Saline Flush) 10 ml 1X PRN  PRN IV AP catheter pack;  

Start 4/11/20 at 13:30;  Stop 4/12/20 at 13:29;  Status DC


Sodium Chloride (Normal Saline Flush) 10 ml 1X PRN  PRN IV  catheter pack;  

Start 4/11/20 at 13:30;  Stop 4/12/20 at 13:29;  Status DC


Sodium Chloride 1,000 ml @  400 mls/hr Q2H30M PRN IV PATENCY;  Start 4/11/20 at 

13:26;  Stop 4/12/20 at 01:25;  Status DC


Info (PHARMACY MONITORING -- do not chart) 1 each PRN DAILY  PRN MC SEE 

COMMENTS;  Start 4/11/20 at 13:30;  Stop 4/11/20 at 13:33;  Status DC


Info (PHARMACY MONITORING -- do not chart) 1 each PRN DAILY  PRN MC SEE 

COMMENTS;  Start 4/11/20 at 13:30;  Stop 4/11/20 at 13:34;  Status DC


Sodium Chloride 90 meq/Potassium Phosphate 19 mmol/ Magnesium Sulfate 12 

meq/Calcium Gluconate 15 meq/ Multivitamins 10 ml/Chromium/ Copper/Manganese/ 

Seleni/Zn 0.5 ml/ Insulin Human Regular 40 unit/ Total Parenteral 

Nutrition/Amino Acids/Dextrose/ Fat Emulsion Intravenous 1,400 ml @  58.333 mls/

hr TPN  CONT IV  Last administered on 4/12/20at 21:54;  Start 4/12/20 at 22:00; 

Stop 4/13/20 at 21:59;  Status DC


Sodium Chloride 1,000 ml @  1,000 mls/hr Q1H PRN IV hypotension;  Start 4/13/20 

at 09:35;  Stop 4/13/20 at 15:34;  Status DC


Albumin Human 200 ml @  200 mls/hr 1X PRN  PRN IV Hypotension;  Start 4/13/20 at

09:45;  Stop 4/13/20 at 15:44;  Status DC


Diphenhydramine HCl (Benadryl) 25 mg 1X PRN  PRN IV ITCHING;  Start 4/13/20 at 

09:45;  Stop 4/14/20 at 09:44;  Status DC


Diphenhydramine HCl (Benadryl) 25 mg 1X PRN  PRN IV ITCHING;  Start 4/13/20 at 

09:45;  Stop 4/14/20 at 09:44;  Status DC


Sodium Chloride 1,000 ml @  400 mls/hr Q2H30M PRN IV PATENCY;  Start 4/13/20 at 

09:35;  Stop 4/13/20 at 21:34;  Status DC


Info (PHARMACY MONITORING -- do not chart) 1 each PRN DAILY  PRN MC SEE 

COMMENTS;  Start 4/13/20 at 09:45;  Status Cancel


Sodium Chloride 100 meq/Potassium Phosphate 19 mmol/ Magnesium Sulfate 12 

meq/Calcium Gluconate 15 meq/ Multivitamins 10 ml/Chromium/ Copper/Manganese/ 

Seleni/Zn 0.5 ml/ Insulin Human Regular 40 unit/ Potassium Chloride 20 meq/ 

Total Parenteral Nutrition/Amino Acids/Dextrose/ Fat Emulsion Intravenous 1,400 

ml @  58.333 mls/ hr TPN  CONT IV  Last administered on 4/13/20at 22:02;  Start 

4/13/20 at 22:00;  Stop 4/14/20 at 21:59;  Status DC


Furosemide (Lasix) 40 mg 1X  ONCE IVP  Last administered on 4/13/20at 14:39;  

Start 4/13/20 at 14:30;  Stop 4/13/20 at 14:31;  Status DC


Metronidazole 100 ml @  100 mls/hr Q8HRS IV  Last administered on 4/21/20at 

06:04;  Start 4/14/20 at 10:00;  Stop 4/21/20 at 08:10;  Status DC


Sodium Chloride 1,000 ml @  1,000 mls/hr Q1H PRN IV hypotension;  Start 4/14/20 

at 08:00;  Stop 4/14/20 at 13:59;  Status DC


Albumin Human 200 ml @  200 mls/hr 1X PRN  PRN IV Hypotension;  Start 4/14/20 at

08:00;  Stop 4/14/20 at 13:59;  Status DC


Sodium Chloride 1,000 ml @  400 mls/hr Q2H30M PRN IV PATENCY;  Start 4/14/20 at 

08:00;  Stop 4/14/20 at 19:59;  Status DC


Info (PHARMACY MONITORING -- do not chart) 1 each PRN DAILY  PRN MC SEE 

COMMENTS;  Start 4/14/20 at 11:30;  Status UNV


Info (PHARMACY MONITORING -- do not chart) 1 each PRN DAILY  PRN MC SEE 

COMMENTS;  Start 4/14/20 at 11:30;  Stop 4/16/20 at 12:13;  Status DC


Sodium Chloride 100 meq/Potassium Phosphate 19 mmol/ Magnesium Sulfate 12 

meq/Calcium Gluconate 15 meq/ Multivitamins 10 ml/Chromium/ Copper/Manganese/ 

Seleni/Zn 0.5 ml/ Insulin Human Regular 40 unit/ Potassium Chloride 20 meq/ 

Total Parenteral Nutrition/Amino Acids/Dextrose/ Fat Emulsion Intravenous 1,400 

ml @  58.333 mls/ hr TPN  CONT IV  Last administered on 4/14/20at 21:52;  Start 

4/14/20 at 22:00;  Stop 4/15/20 at 21:59;  Status DC


Sodium Chloride (Normal Saline Flush) 10 ml QSHIFT  PRN IV AFTER MEDS AND BLOOD 

DRAWS;  Start 4/14/20 at 15:00;  Stop 5/12/20 at 11:27;  Status DC


Sodium Chloride (Normal Saline Flush) 10 ml PRN Q5MIN  PRN IV AFTER MEDS AND 

BLOOD DRAWS;  Start 4/14/20 at 15:00


Sodium Chloride (Normal Saline Flush) 20 ml PRN Q5MIN  PRN IV AFTER MEDS AND 

BLOOD DRAWS;  Start 4/14/20 at 15:00


Sodium Chloride 100 meq/Potassium Phosphate 19 mmol/ Magnesium Sulfate 12 

meq/Calcium Gluconate 15 meq/ Multivitamins 10 ml/Chromium/ Copper/Manganese/ 

Seleni/Zn 0.5 ml/ Insulin Human Regular 40 unit/ Potassium Chloride 20 meq/ 

Total Parenteral Nutrition/Amino Acids/Dextrose/ Fat Emulsion Intravenous 1,400 

ml @  58.333 mls/ hr TPN  CONT IV  Last administered on 4/15/20at 21:20;  Start 

4/15/20 at 22:00;  Stop 4/16/20 at 21:59;  Status DC


Lidocaine HCl (Buffered Lidocaine 1%) 3 ml STK-MED ONCE .ROUTE ;  Start 4/15/20 

at 13:16;  Stop 4/15/20 at 13:16;  Status DC


Lidocaine HCl (Buffered Lidocaine 1%) 6 ml 1X  ONCE INJ  Last administered on 

4/15/20at 13:45;  Start 4/15/20 at 13:30;  Stop 4/15/20 at 13:31;  Status DC


Albumin Human 100 ml @  100 mls/hr 1X  ONCE IV  Last administered on 4/15/20at 

15:41;  Start 4/15/20 at 15:00;  Stop 4/15/20 at 15:59;  Status DC


Albumin Human 50 ml @ 50 mls/hr 1X  ONCE IV  Last administered on 4/15/20at 

15:00;  Start 4/15/20 at 15:00;  Stop 4/15/20 at 15:59;  Status DC


Info (PHARMACY MONITORING -- do not chart) 1 each PRN DAILY  PRN MC SEE 

COMMENTS;  Start 4/16/20 at 11:30;  Status Cancel


Info (PHARMACY MONITORING -- do not chart) 1 each PRN DAILY  PRN MC SEE 

COMMENTS;  Start 4/16/20 at 11:30;  Status UNV


Sodium Chloride 100 meq/Potassium Phosphate 10 mmol/ Magnesium Sulfate 12 

meq/Calcium Gluconate 15 meq/ Multivitamins 10 ml/Chromium/ Copper/Manganese/ 

Seleni/Zn 0.5 ml/ Insulin Human Regular 35 unit/ Potassium Chloride 20 meq/ 

Total Parenteral Nutrition/Amino Acids/Dextrose/ Fat Emulsion Intravenous 1,400 

ml @  58.333 mls/ hr TPN  CONT IV  Last administered on 4/16/20at 22:10;  Start 

4/16/20 at 22:00;  Stop 4/17/20 at 21:59;  Status DC


Sodium Chloride 100 meq/Potassium Phosphate 5 mmol/ Magnesium Sulfate 12 

meq/Calcium Gluconate 15 meq/ Multivitamins 10 ml/Chromium/ Copper/Manganese/ 

Seleni/Zn 0.5 ml/ Insulin Human Regular 35 unit/ Potassium Chloride 20 meq/ T

otal Parenteral Nutrition/Amino Acids/Dextrose/ Fat Emulsion Intravenous 1,400 

ml @  58.333 mls/ hr TPN  CONT IV  Last administered on 4/17/20at 22:59;  Start 

4/17/20 at 22:00;  Stop 4/18/20 at 21:59;  Status DC


Sodium Chloride 1,000 ml @  1,000 mls/hr Q1H PRN IV hypotension;  Start 4/18/20 

at 08:27;  Stop 4/18/20 at 14:26;  Status DC


Albumin Human 200 ml @  200 mls/hr 1X PRN  PRN IV Hypotension Last administered 

on 4/18/20at 09:18;  Start 4/18/20 at 08:30;  Stop 4/18/20 at 14:29;  Status DC


Sodium Chloride 1,000 ml @  400 mls/hr Q2H30M PRN IV PATENCY;  Start 4/18/20 at 

08:27;  Stop 4/18/20 at 20:26;  Status DC


Info (PHARMACY MONITORING -- do not chart) 1 each PRN DAILY  PRN MC SEE 

COMMENTS;  Start 4/18/20 at 08:30;  Status Cancel


Info (PHARMACY MONITORING -- do not chart) 1 each PRN DAILY  PRN MC SEE 

COMMENTS;  Start 4/18/20 at 08:30;  Stop 4/26/20 at 13:10;  Status DC


Sodium Chloride 100 meq/Potassium Chloride 40 meq/ Magnesium Sulfate 15 

meq/Calcium Gluconate 15 meq/ Multivitamins 10 ml/Chromium/ Copper/Manganese/ 

Seleni/Zn 0.5 ml/ Insulin Human Regular 35 unit/ Total Parenteral 

Nutrition/Amino Acids/Dextrose/ Fat Emulsion Intravenous 1,400 ml @  58.333 mls/

hr TPN  CONT IV  Last administered on 4/18/20at 22:00;  Start 4/18/20 at 22:00; 

Stop 4/19/20 at 21:59;  Status DC


Potassium Chloride/Water 100 ml @  100 mls/hr 1X  ONCE IV  Last administered on 

4/18/20at 17:28;  Start 4/18/20 at 14:45;  Stop 4/18/20 at 15:44;  Status DC


Sodium Chloride 100 meq/Potassium Chloride 40 meq/ Magnesium Sulfate 15 

meq/Calcium Gluconate 15 meq/ Multivitamins 10 ml/Chromium/ Copper/Manganese/ 

Seleni/Zn 0.5 ml/ Insulin Human Regular 35 unit/ Total Parenteral 

Nutrition/Amino Acids/Dextrose/ Fat Emulsion Intravenous 1,400 ml @  58.333 mls/

hr TPN  CONT IV  Last administered on 4/19/20at 22:46;  Start 4/19/20 at 22:00; 

Stop 4/20/20 at 21:59;  Status DC


Sodium Chloride 100 meq/Potassium Chloride 40 meq/ Magnesium Sulfate 20 

meq/Calcium Gluconate 15 meq/ Multivitamins 10 ml/Chromium/ Copper/Manganese/ 

Seleni/Zn 0.5 ml/ Insulin Human Regular 35 unit/ Total Parenteral 

Nutrition/Amino Acids/Dextrose/ Fat Emulsion Intravenous 1,400 ml @  58.333 mls/

hr TPN  CONT IV  Last administered on 4/20/20at 22:31;  Start 4/20/20 at 22:00; 

Stop 4/21/20 at 21:59;  Status DC


Fentanyl Citrate (Fentanyl 2ml Vial) 50 mcg PRN Q2HR  PRN IVP PAIN Last 

administered on 4/27/20at 13:32;  Start 4/20/20 at 21:00;  Stop 4/28/20 at 

12:53;  Status DC


Fentanyl Citrate (Fentanyl 2ml Vial) 25 mcg PRN Q2HR  PRN IVP PAIN;  Start 

4/20/20 at 21:00;  Stop 4/28/20 at 12:54;  Status DC


Enoxaparin Sodium (Lovenox 100mg Syringe) 100 mg Q12HR SQ ;  Start 4/21/20 at 

21:00;  Status UNV


Amino Acids/ Glycerin/ Electrolytes 1,000 ml @  75 mls/hr [id number] IV ;  Start 

4/20/20 at 21:15;  Status UNV


Sodium Chloride 1,000 ml @  1,000 mls/hr Q1H PRN IV hypotension;  Start 4/21/20 

at 07:56;  Stop 4/21/20 at 13:55;  Status DC


Albumin Human 200 ml @  200 mls/hr 1X PRN  PRN IV Hypotension Last administered 

on 4/21/20at 08:40;  Start 4/21/20 at 08:00;  Stop 4/21/20 at 13:59;  Status DC


Sodium Chloride 1,000 ml @  400 mls/hr Q2H30M PRN IV PATENCY;  Start 4/21/20 at 

07:56;  Stop 4/21/20 at 19:55;  Status DC


Info (PHARMACY MONITORING -- do not chart) 1 each PRN DAILY  PRN MC SEE 

COMMENTS;  Start 4/21/20 at 08:00;  Status UNV


Info (PHARMACY MONITORING -- do not chart) 1 each PRN DAILY  PRN MC SEE 

COMMENTS;  Start 4/21/20 at 08:00;  Status UNV


Daptomycin 430 mg/ Sodium Chloride 50 ml @  100 mls/hr Q24H IV  Last 

administered on 4/21/20at 12:35;  Start 4/21/20 at 09:00;  Stop 4/21/20 at 

12:49;  Status DC


Sodium Chloride 100 meq/Potassium Chloride 40 meq/ Magnesium Sulfate 20 

meq/Calcium Gluconate 15 meq/ Multivitamins 10 ml/Chromium/ Copper/Manganese/ 

Seleni/Zn 0.5 ml/ Insulin Human Regular 35 unit/ Total Parenteral 

Nutrition/Amino Acids/Dextrose/ Fat Emulsion Intravenous 1,400 ml @  58.333 mls/

hr TPN  CONT IV  Last administered on 4/21/20at 21:26;  Start 4/21/20 at 22:00; 

Stop 4/22/20 at 21:59;  Status DC


Daptomycin 430 mg/ Sodium Chloride 50 ml @  100 mls/hr Q48H IV ;  Start 4/23/20 

at 09:00;  Stop 4/22/20 at 11:55;  Status DC


Sodium Chloride 100 meq/Potassium Chloride 40 meq/ Magnesium Sulfate 20 

meq/Calcium Gluconate 15 meq/ Multivitamins 10 ml/Chromium/ Copper/Manganese/ 

Seleni/Zn 0.5 ml/ Insulin Human Regular 35 unit/ Total Parenteral 

Nutrition/Amino Acids/Dextrose/ Fat Emulsion Intravenous 1,400 ml @  58.333 mls/

hr TPN  CONT IV  Last administered on 4/22/20at 22:27;  Start 4/22/20 at 22:00; 

Stop 4/23/20 at 21:59;  Status DC


Daptomycin 430 mg/ Sodium Chloride 50 ml @  100 mls/hr Q24H IV  Last administe

red on 4/24/20at 15:07;  Start 4/22/20 at 13:00;  Stop 4/25/20 at 13:15;  Status

DC


Sodium Chloride 100 meq/Potassium Chloride 40 meq/ Magnesium Sulfate 20 

meq/Calcium Gluconate 10 meq/ Multivitamins 10 ml/Chromium/ Copper/Manganese/ 

Seleni/Zn 0.5 ml/ Insulin Human Regular 35 unit/ Total Parenteral 

Nutrition/Amino Acids/Dextrose/ Fat Emulsion Intravenous 1,400 ml @  58.333 mls/

hr TPN  CONT IV  Last administered on 4/24/20at 00:06;  Start 4/23/20 at 22:00; 

Stop 4/24/20 at 21:59;  Status DC


Alteplase, Recombinant (Cathflo For Central Catheter Clearance) 1 mg 1X  ONCE 

INT CAT  Last administered on 4/24/20at 11:44;  Start 4/24/20 at 10:45;  Stop 

4/24/20 at 10:46;  Status DC


Ondansetron HCl (Zofran) 4 mg PRN Q6HRS  PRN IV NAUSEA/VOMITING;  Start 4/27/20 

at 07:00;  Stop 4/28/20 at 06:59;  Status DC


Fentanyl Citrate (Fentanyl 2ml Vial) 25 mcg PRN Q5MIN  PRN IV MILD PAIN 1-3;  

Start 4/27/20 at 07:00;  Stop 4/28/20 at 06:59;  Status DC


Fentanyl Citrate (Fentanyl 2ml Vial) 50 mcg PRN Q5MIN  PRN IV MODERATE TO SEVERE

PAIN Last administered on 4/27/20at 10:17;  Start 4/27/20 at 07:00;  Stop 

4/28/20 at 06:59;  Status DC


Ringer's Solution 1,000 ml @  30 mls/hr Q24H IV ;  Start 4/27/20 at 07:00;  Stop

4/27/20 at 18:59;  Status DC


Lidocaine HCl (Xylocaine-Mpf 1% 2ml Vial) 2 ml PRN 1X  PRN ID PRIOR TO IV START;

 Start 4/27/20 at 07:00;  Stop 4/28/20 at 06:59;  Status DC


Prochlorperazine Edisylate (Compazine) 5 mg PACU PRN  PRN IV NAUSEA, MRX1;  

Start 4/27/20 at 07:00;  Stop 4/28/20 at 06:59;  Status DC


Sodium Acetate 50 meq/Potassium Acetate 55 meq/ Magnesium Sulfate 20 meq/Calcium

Gluconate 10 meq/ Multivitamins 10 ml/Chromium/ Copper/Manganese/ Seleni/Zn 0.5 

ml/ Insulin Human Regular 35 unit/ Total Parenteral Nutrition/Amino 

Acids/Dextrose/ Fat Emulsion Intravenous 1,400 ml @  58.333 mls/ hr TPN  CONT IV

;  Start 4/24/20 at 22:00;  Stop 4/24/20 at 14:15;  Status DC


Sodium Acetate 50 meq/Potassium Acetate 55 meq/ Magnesium Sulfate 20 meq/Calcium

Gluconate 10 meq/ Multivitamins 10 ml/Chromium/ Copper/Manganese/ Seleni/Zn 0.5 

ml/ Insulin Human Regular 35 unit/ Total Parenteral Nutrition/Amino 

Acids/Dextrose/ Fat Emulsion Intravenous 1,800 ml @  75 mls/hr TPN  CONT IV  

Last administered on 4/24/20at 22:38;  Start 4/24/20 at 22:00;  Stop 4/25/20 at 

21:59;  Status DC


Sodium Chloride 1,000 ml @  1,000 mls/hr Q1H PRN IV hypotension;  Start 4/24/20 

at 15:31;  Stop 4/24/20 at 21:30;  Status DC


Diphenhydramine HCl (Benadryl) 25 mg 1X PRN  PRN IV ITCHING;  Start 4/24/20 at 

15:45;  Stop 4/25/20 at 15:44;  Status DC


Diphenhydramine HCl (Benadryl) 25 mg 1X PRN  PRN IV ITCHING;  Start 4/24/20 at 

15:45;  Stop 4/25/20 at 15:44;  Status DC


Sodium Chloride 1,000 ml @  400 mls/hr Q2H30M PRN IV PATENCY;  Start 4/24/20 at 

15:31;  Stop 4/25/20 at 03:30;  Status DC


Info (PHARMACY MONITORING -- do not chart) 1 each PRN DAILY  PRN MC SEE 

COMMENTS;  Start 4/24/20 at 15:45;  Stop 5/26/20 at 14:14;  Status DC


Sodium Acetate 50 meq/Potassium Acetate 55 meq/ Magnesium Sulfate 20 meq/Calcium

Gluconate 10 meq/ Multivitamins 10 ml/Chromium/ Copper/Manganese/ Seleni/Zn 0.5 

ml/ Insulin Human Regular 35 unit/ Total Parenteral Nutrition/Amino 

Acids/Dextrose/ Fat Emulsion Intravenous 1,800 ml @  75 mls/hr TPN  CONT IV  

Last administered on 4/25/20at 22:03;  Start 4/25/20 at 22:00;  Stop 4/26/20 at 

21:59;  Status DC


Daptomycin 430 mg/ Sodium Chloride 50 ml @  100 mls/hr Q24H IV  Last 

administered on 4/30/20at 13:00;  Start 4/25/20 at 13:00;  Stop 4/30/20 at 

20:58;  Status DC


Heparin Sodium (Porcine) 1000 unit/Sodium Chloride 1,001 ml @  1,001 mls/hr 1X  

ONCE IRR ;  Start 4/27/20 at 06:00;  Stop 4/27/20 at 06:59;  Status DC


Potassium Acetate 55 meq/Magnesium Sulfate 20 meq/ Calcium Gluconate 10 meq/ 

Multivitamins 10 ml/Chromium/ Copper/Manganese/ Seleni/Zn 0.5 ml/ Insulin Human 

Regular 35 unit/ Total Parenteral Nutrition/Amino Acids/Dextrose/ Fat Emulsion 

Intravenous 1,920 ml @  80 mls/hr TPN  CONT IV  Last administered on 4/26/20at 

22:10;  Start 4/26/20 at 22:00;  Stop 4/27/20 at 21:59;  Status DC


Dexamethasone Sodium Phosphate (Decadron) 4 mg STK-MED ONCE .ROUTE ;  Start 4 /27/20 at 10:56;  Stop 4/27/20 at 10:57;  Status DC


Ondansetron HCl (Zofran) 4 mg STK-MED ONCE .ROUTE ;  Start 4/27/20 at 10:56;  

Stop 4/27/20 at 10:57;  Status DC


Rocuronium Bromide (Zemuron) 50 mg STK-MED ONCE .ROUTE ;  Start 4/27/20 at 

10:56;  Stop 4/27/20 at 10:57;  Status DC


Fentanyl Citrate (Fentanyl 2ml Vial) 100 mcg STK-MED ONCE .ROUTE ;  Start 

4/27/20 at 10:56;  Stop 4/27/20 at 10:57;  Status DC


Bupivacaine HCl/ Epinephrine Bitart (Sensorcain-Epi 0.5%-1:459304 Mpf) 30 ml 

STK-MED ONCE .ROUTE  Last administered on 4/27/20at 12:01;  Start 4/27/20 at 

10:58;  Stop 4/27/20 at 10:58;  Status DC


Cellulose (Surgicel Hemostat 2x14) 1 each STK-MED ONCE .ROUTE ;  Start 4/27/20 

at 10:58;  Stop 4/27/20 at 10:59;  Status DC


Iohexol (Omnipaque 300 Mg/ml) 50 ml STK-MED ONCE .ROUTE ;  Start 4/27/20 at 

10:58;  Stop 4/27/20 at 10:59;  Status DC


Cellulose (Surgicel Hemostat 4x8) 1 each STK-MED ONCE .ROUTE ;  Start 4/27/20 at

10:58;  Stop 4/27/20 at 10:59;  Status DC


Bisacodyl (Dulcolax Supp) 10 mg STK-MED ONCE .ROUTE ;  Start 4/27/20 at 10:59;  

Stop 4/27/20 at 10:59;  Status DC


Heparin Sodium (Porcine) 1000 unit/Sodium Chloride 1,001 ml @  1,001 mls/hr 1X  

ONCE IRR ;  Start 4/27/20 at 12:00;  Stop 4/27/20 at 12:59;  Status DC


Propofol 20 ml @ As Directed STK-MED ONCE IV ;  Start 4/27/20 at 11:05;  Stop 

4/27/20 at 11:05;  Status DC


Sevoflurane (Ultane) 90 ml STK-MED ONCE IH ;  Start 4/27/20 at 11:05;  Stop 

4/27/20 at 11:05;  Status DC


Sevoflurane (Ultane) 60 ml STK-MED ONCE IH ;  Start 4/27/20 at 12:26;  Stop 

4/27/20 at 12:27;  Status DC


Propofol 20 ml @ As Directed STK-MED ONCE IV ;  Start 4/27/20 at 12:26;  Stop 

4/27/20 at 12:27;  Status DC


Phenylephrine HCl (PHENYLEPHRINE in 0.9% NACL PF) 1 mg STK-MED ONCE IV ;  Start 

4/27/20 at 12:34;  Stop 4/27/20 at 12:34;  Status DC


Heparin Sodium (Porcine) (Heparin Sodium) 5,000 unit Q12HR SQ  Last administered

on 5/6/20at 20:57;  Start 4/27/20 at 21:00;  Stop 5/7/20 at 09:59;  Status DC


Sodium Chloride (Normal Saline Flush) 3 ml QSHIFT  PRN IV AFTER MEDS AND BLOOD 

DRAWS;  Start 4/27/20 at 13:45


Naloxone HCl (Narcan) 0.4 mg PRN Q2MIN  PRN IV SEE INSTRUCTIONS Last 

administered on 6/6/20at 15:15;  Start 4/27/20 at 13:45


Sodium Chloride 1,000 ml @  25 mls/hr Q24H IV  Last administered on 5/26/20at 

13:37;  Start 4/27/20 at 13:37;  Stop 5/29/20 at 13:09;  Status DC


Naloxone HCl (Narcan) 0.4 mg PRN Q2MIN  PRN IV SEE INSTRUCTIONS;  Start 4/27/20 

at 14:30;  Status UNV


Sodium Chloride 1,000 ml @  25 mls/hr Q24H IV ;  Start 4/27/20 at 14:30;  Status

UNV


Hydromorphone HCl 30 ml @ 0 mls/hr CONT PRN  PRN IV PER PROTOCOL Last 

administered on 5/2/20at 16:08;  Start 4/27/20 at 14:30;  Stop 5/4/20 at 08:55; 

Status DC


Potassium Acetate 55 meq/Magnesium Sulfate 20 meq/ Calcium Gluconate 10 meq/ 

Multivitamins 10 ml/Chromium/ Copper/Manganese/ Seleni/Zn 0.5 ml/ Insulin Human 

Regular 35 unit/ Total Parenteral Nutrition/Amino Acids/Dextrose/ Fat Emulsion 

Intravenous 1,920 ml @  80 mls/hr TPN  CONT IV  Last administered on 4/27/20at 

22:01;  Start 4/27/20 at 22:00;  Stop 4/28/20 at 21:59;  Status DC


Bumetanide (Bumex) 2 mg BID92 IV  Last administered on 5/1/20at 13:50;  Start 

4/28/20 at 14:00;  Stop 5/2/20 at 14:10;  Status DC


Meropenem 1 gm/ Sodium Chloride 100 ml @  200 mls/hr Q8HRS IV  Last administered

on 5/22/20at 05:53;  Start 4/28/20 at 14:00;  Stop 5/22/20 at 09:31;  Status DC


Potassium Acetate 55 meq/Magnesium Sulfate 20 meq/ Calcium Gluconate 10 meq/ 

Multivitamins 10 ml/Chromium/ Copper/Manganese/ Seleni/Zn 0.5 ml/ Insulin Human 

Regular 35 unit/ Total Parenteral Nutrition/Amino Acids/Dextrose/ Fat Emulsion 

Intravenous 1,920 ml @  80 mls/hr TPN  CONT IV  Last administered on 4/28/20at 

22:02;  Start 4/28/20 at 22:00;  Stop 4/29/20 at 21:59;  Status DC


Hydromorphone HCl (Dilaudid Standard PCA) 12 mg STK-MED ONCE IV ;  Start 4/27/20

at 14:35;  Stop 4/28/20 at 13:53;  Status DC


Artificial Tears (Artificial Tears) 1 drop PRN Q15MIN  PRN OU DRY EYE Last 

administered on 6/23/20at 21:17;  Start 4/29/20 at 05:30


Hydromorphone HCl (Dilaudid Standard PCA) 12 mg STK-MED ONCE IV ;  Start 4/28/20

at 12:05;  Stop 4/29/20 at 09:15;  Status DC


Potassium Acetate 65 meq/Magnesium Sulfate 20 meq/ Calcium Gluconate 10 meq/ 

Multivitamins 10 ml/Chromium/ Copper/Manganese/ Seleni/Zn 0.5 ml/ Insulin Human 

Regular 30 unit/ Total Parenteral Nutrition/Amino Acids/Dextrose/ Fat Emulsion 

Intravenous 1,920 ml @  80 mls/hr TPN  CONT IV  Last administered on 4/29/20at 

22:22;  Start 4/29/20 at 22:00;  Stop 4/30/20 at 21:59;  Status DC


Cyclobenzaprine HCl (Flexeril) 10 mg PRN Q6HRS  PRN PO MUSCLE SPASMS;  Start 

4/30/20 at 10:45


Potassium Acetate 55 meq/Magnesium Sulfate 20 meq/ Calcium Gluconate 10 meq/ 

Multivitamins 10 ml/Chromium/ Copper/Manganese/ Seleni/Zn 0.5 ml/ Insulin Human 

Regular 30 unit/ Total Parenteral Nutrition/Amino Acids/Dextrose/ Fat Emulsion 

Intravenous 1,920 ml @  80 mls/hr TPN  CONT IV  Last administered on 5/1/20at 

01:00;  Start 4/30/20 at 22:00;  Stop 5/1/20 at 21:59;  Status DC


Magnesium Sulfate 50 ml @ 25 mls/hr 1X  ONCE IV  Last administered on 4/30/20at 

17:18;  Start 4/30/20 at 12:45;  Stop 4/30/20 at 14:44;  Status DC


Potassium Chloride/Water 100 ml @  100 mls/hr 1X  ONCE IV  Last administered on 

5/1/20at 11:27;  Start 5/1/20 at 12:00;  Stop 5/1/20 at 12:59;  Status DC


Hydromorphone HCl (Dilaudid Standard PCA) 12 mg STK-MED ONCE IV ;  Start 4/29/20

at 10:50;  Stop 5/1/20 at 11:02;  Status DC


Hydromorphone HCl (Dilaudid Standard PCA) 12 mg STK-MED ONCE IV ;  Start 4/30/20

at 13:47;  Stop 5/1/20 at 11:03;  Status DC


Potassium Acetate 30 meq/Magnesium Sulfate 20 meq/ Calcium Gluconate 10 meq/ 

Multivitamins 10 ml/Chromium/ Copper/Manganese/ Seleni/Zn 0.5 ml/ Insulin Human 

Regular 30 unit/ Potassium Chloride 30 meq/ Total Parenteral Nutrition/Amino Ac

ids/Dextrose/ Fat Emulsion Intravenous 1,920 ml @  80 mls/hr TPN  CONT IV  Last 

administered on 5/1/20at 22:34;  Start 5/1/20 at 22:00;  Stop 5/2/20 at 21:59;  

Status DC


Potassium Chloride/Water 100 ml @  100 mls/hr Q1H IV  Last administered on 

5/2/20at 13:05;  Start 5/2/20 at 07:00;  Stop 5/2/20 at 10:59;  Status DC


Magnesium Sulfate 50 ml @ 25 mls/hr 1X  ONCE IV  Last administered on 5/2/20at 

10:34;  Start 5/2/20 at 10:30;  Stop 5/2/20 at 12:29;  Status DC


Potassium Chloride 75 meq/ Magnesium Sulfate 20 meq/Calcium Gluconate 10 meq/ 

Multivitamins 10 ml/Chromium/ Copper/Manganese/ Seleni/Zn 0.5 ml/ Insulin Human 

Regular 30 unit/ Total Parenteral Nutrition/Amino Acids/Dextrose/ Fat Emulsion 

Intravenous 1,920 ml @  80 mls/hr TPN  CONT IV  Last administered on 5/2/20at 

21:51;  Start 5/2/20 at 22:00;  Stop 5/3/20 at 22:00;  Status DC


Potassium Chloride 75 meq/ Magnesium Sulfate 20 meq/Calcium Gluconate 10 meq/ 

Multivitamins 10 ml/Chromium/ Copper/Manganese/ Seleni/Zn 0.5 ml/ Insulin Human 

Regular 25 unit/ Total Parenteral Nutrition/Amino Acids/Dextrose/ Fat Emulsion 

Intravenous 1,920 ml @  80 mls/hr TPN  CONT IV  Last administered on 5/3/20at 

22:04;  Start 5/3/20 at 22:00;  Stop 5/4/20 at 21:59;  Status DC


Hydromorphone HCl (Dilaudid) 0.4 mg PRN Q4HRS  PRN IVP PAIN Last administered on

5/4/20at 10:57;  Start 5/4/20 at 09:00;  Stop 5/4/20 at 18:59;  Status DC


Micafungin Sodium 100 mg/Dextrose 100 ml @  100 mls/hr Q24H IV  Last 

administered on 5/26/20at 12:17;  Start 5/4/20 at 11:00;  Stop 5/27/20 at 09:59;

 Status DC


Daptomycin 485 mg/ Sodium Chloride 50 ml @  100 mls/hr Q24H IV  Last 

administered on 5/11/20at 13:10;  Start 5/4/20 at 11:00;  Stop 5/12/20 at 07:44;

 Status DC


Potassium Chloride 75 meq/ Magnesium Sulfate 15 meq/Calcium Gluconate 8 meq/ 

Multivitamins 10 ml/Chromium/ Copper/Manganese/ Seleni/Zn 0.5 ml/ Insulin Human 

Regular 25 unit/ Total Parenteral Nutrition/Amino Acids/Dextrose/ Fat Emulsion 

Intravenous 1,920 ml @  80 mls/hr TPN  CONT IV  Last administered on 5/4/20at 

23:08;  Start 5/4/20 at 22:00;  Stop 5/5/20 at 21:59;  Status DC


Haloperidol Lactate (Haldol Inj) 3 mg 1X  ONCE IVP  Last administered on 

5/4/20at 14:37;  Start 5/4/20 at 14:30;  Stop 5/4/20 at 14:31;  Status DC


Hydromorphone HCl (Dilaudid) 1 mg PRN Q4HRS  PRN IVP PAIN Last administered on 

5/18/20at 06:25;  Start 5/4/20 at 19:00;  Stop 5/18/20 at 17:10;  Status DC


Potassium Chloride 75 meq/ Magnesium Sulfate 15 meq/Calcium Gluconate 8 meq/ 

Multivitamins 10 ml/Chromium/ Copper/Manganese/ Seleni/Zn 0.5 ml/ Insulin Human 

Regular 20 unit/ Total Parenteral Nutrition/Amino Acids/Dextrose/ Fat Emulsion 

Intravenous 1,920 ml @  80 mls/hr TPN  CONT IV  Last administered on 5/5/20at 

22:10;  Start 5/5/20 at 22:00;  Stop 5/6/20 at 21:59;  Status DC


Lidocaine HCl (Buffered Lidocaine 1%) 3 ml STK-MED ONCE .ROUTE ;  Start 5/6/20 

at 11:31;  Stop 5/6/20 at 11:31;  Status DC


Lidocaine HCl (Buffered Lidocaine 1%) 3 ml STK-MED ONCE .ROUTE ;  Start 5/6/20 

at 12:28;  Stop 5/6/20 at 12:29;  Status DC


Lidocaine HCl (Buffered Lidocaine 1%) 6 ml 1X  ONCE INJ  Last administered on 

5/6/20at 12:53;  Start 5/6/20 at 12:45;  Stop 5/6/20 at 12:46;  Status DC


Potassium Chloride 75 meq/ Magnesium Sulfate 15 meq/Calcium Gluconate 8 meq/ 

Multivitamins 10 ml/Chromium/ Copper/Manganese/ Seleni/Zn 0.5 ml/ Insulin Human 

Regular 20 unit/ Total Parenteral Nutrition/Amino Acids/Dextrose/ Fat Emulsion 

Intravenous 1,920 ml @  80 mls/hr TPN  CONT IV  Last administered on 5/6/20at 

22:00;  Start 5/6/20 at 22:00;  Stop 5/7/20 at 21:59;  Status DC


Potassium Chloride 75 meq/ Magnesium Sulfate 15 meq/Calcium Gluconate 8 meq/ 

Multivitamins 10 ml/Chromium/ Copper/Manganese/ Seleni/Zn 0.5 ml/ Insulin Human 

Regular 15 unit/ Total Parenteral Nutrition/Amino Acids/Dextrose/ Fat Emulsion 

Intravenous 1,920 ml @  80 mls/hr TPN  CONT IV  Last administered on 5/7/20at 

22:28;  Start 5/7/20 at 22:00;  Stop 5/8/20 at 21:59;  Status DC


Vecuronium Bromide (Norcuron Bolus) 6 mg PRN Q6HRS  PRN IV VENT ASYNCHRONY;  

Start 5/7/20 at 19:15;  Stop 5/7/20 at 19:35;  Status DC


Bumetanide (Bumex) 2 mg 1X  ONCE IV  Last administered on 5/7/20at 22:09;  Start

5/7/20 at 19:45;  Stop 5/7/20 at 19:46;  Status DC


Lidocaine HCl (Buffered Lidocaine 1%) 3 ml STK-MED ONCE .ROUTE ;  Start 5/8/20 

at 07:59;  Stop 5/8/20 at 07:59;  Status DC


Midazolam HCl (Versed) 5 mg STK-MED ONCE .ROUTE ;  Start 5/8/20 at 08:36;  Stop 

5/8/20 at 08:36;  Status DC


Fentanyl Citrate (Fentanyl 5ml Vial) 250 mcg STK-MED ONCE .ROUTE ;  Start 5/8/20

at 08:36;  Stop 5/8/20 at 08:37;  Status DC


Lidocaine HCl (Buffered Lidocaine 1%) 3 ml 1X  ONCE IJ  Last administered on 

5/8/20at 09:30;  Start 5/8/20 at 09:15;  Stop 5/8/20 at 09:16;  Status DC


Midazolam HCl (Versed) 5 mg 1X  ONCE IV  Last administered on 5/8/20at 09:30;  

Start 5/8/20 at 09:15;  Stop 5/8/20 at 09:16;  Status DC


Fentanyl Citrate (Fentanyl 5ml Vial) 250 mcg 1X  ONCE IV  Last administered on 

5/8/20at 09:30;  Start 5/8/20 at 09:15;  Stop 5/8/20 at 09:16;  Status DC


Bumetanide (Bumex) 2 mg DAILY IV  Last administered on 5/18/20at 08:07;  Start 

5/8/20 at 10:00;  Stop 5/18/20 at 17:15;  Status DC


Potassium Chloride 75 meq/ Magnesium Sulfate 15 meq/ Multivitamins 10 

ml/Chromium/ Copper/Manganese/ Seleni/Zn 0.5 ml/ Insulin Human Regular 15 unit/ 

Total Parenteral Nutrition/Amino Acids/Dextrose/ Fat Emulsion Intravenous 1,920 

ml @  80 mls/hr TPN  CONT IV  Last administered on 5/8/20at 21:59;  Start 5/8/20

at 22:00;  Stop 5/9/20 at 21:59;  Status DC


Metoclopramide HCl (Reglan Vial) 10 mg PRN Q3HRS  PRN IVP NAUSEA/VOMITING-3rd 

choice Last administered on 5/14/20at 04:25;  Start 5/9/20 at 16:45


Potassium Chloride 75 meq/ Magnesium Sulfate 15 meq/ Multivitamins 10 

ml/Chromium/ Copper/Manganese/ Seleni/Zn 0.5 ml/ Insulin Human Regular 15 unit/ 

Total Parenteral Nutrition/Amino Acids/Dextrose/ Fat Emulsion Intravenous 1,920 

ml @  80 mls/hr TPN  CONT IV  Last administered on 5/9/20at 22:41;  Start 5/9/20

at 22:00;  Stop 5/10/20 at 21:59;  Status DC


Magnesium Sulfate 50 ml @ 25 mls/hr 1X  ONCE IV  Last administered on 5/10/20at 

10:44;  Start 5/10/20 at 09:00;  Stop 5/10/20 at 10:59;  Status DC


Potassium Chloride/Water 100 ml @  100 mls/hr 1X  ONCE IV  Last administered on 

5/10/20at 09:37;  Start 5/10/20 at 09:00;  Stop 5/10/20 at 09:59;  Status DC


Duloxetine HCl (Cymbalta) 30 mg DAILY PO  Last administered on 5/11/20at 09:48; 

Start 5/10/20 at 14:00;  Stop 5/13/20 at 10:25;  Status DC


Potassium Chloride 80 meq/ Magnesium Sulfate 20 meq/ Multivitamins 10 

ml/Chromium/ Copper/Manganese/ Seleni/Zn 0.5 ml/ Insulin Human Regular 15 unit/ 

Total Parenteral Nutrition/Amino Acids/Dextrose/ Fat Emulsion Intravenous 1,920 

ml @  80 mls/hr TPN  CONT IV  Last administered on 5/10/20at 21:42;  Start 

5/10/20 at 22:00;  Stop 5/11/20 at 21:59;  Status DC


Potassium Chloride 80 meq/ Magnesium Sulfate 20 meq/ Multivitamins 10 

ml/Chromium/ Copper/Manganese/ Seleni/Zn 0.5 ml/ Insulin Human Regular 15 unit/ 

Total Parenteral Nutrition/Amino Acids/Dextrose/ Fat Emulsion Intravenous 1,920 

ml @  80 mls/hr TPN  CONT IV  Last administered on 5/11/20at 22:20;  Start 

5/11/20 at 22:00;  Stop 5/12/20 at 21:59;  Status DC


Lidocaine HCl (Buffered Lidocaine 1%) 3 ml STK-MED ONCE .ROUTE ;  Start 5/12/20 

at 09:54;  Stop 5/12/20 at 09:55;  Status DC


Hydromorphone HCl (Dilaudid Standard PCA) 12 mg STK-MED ONCE IV ;  Start 5/1/20 

at 15:50;  Stop 5/12/20 at 11:24;  Status DC


Potassium Chloride 80 meq/ Magnesium Sulfate 20 meq/ Multivitamins 10 

ml/Chromium/ Copper/Manganese/ Seleni/Zn 0.5 ml/ Insulin Human Regular 15 unit/ 

Total Parenteral Nutrition/Amino Acids/Dextrose/ Fat Emulsion Intravenous 1,920 

ml @  80 mls/hr TPN  CONT IV  Last administered on 5/12/20at 21:40;  Start 

5/12/20 at 22:00;  Stop 5/13/20 at 21:59;  Status DC


Lidocaine HCl (Buffered Lidocaine 1%) 6 ml 1X  ONCE INJ  Last administered on 

5/12/20at 14:15;  Start 5/12/20 at 14:15;  Stop 5/12/20 at 14:16;  Status DC


Potassium Chloride 80 meq/ Magnesium Sulfate 20 meq/ Multivitamins 10 

ml/Chromium/ Copper/Manganese/ Seleni/Zn 1 ml/ Insulin Human Regular 15 unit/ 

Total Parenteral Nutrition/Amino Acids/Dextrose/ Fat Emulsion Intravenous 1,920 

ml @  80 mls/hr TPN  CONT IV  Last administered on 5/13/20at 22:04;  Start 

5/13/20 at 22:00;  Stop 5/14/20 at 21:59;  Status DC


Potassium Chloride/Water 100 ml @  100 mls/hr 1X  ONCE IV  Last administered on 

5/14/20at 11:34;  Start 5/14/20 at 11:00;  Stop 5/14/20 at 11:59;  Status DC


Potassium Chloride 90 meq/ Magnesium Sulfate 20 meq/ Multivitamins 10 

ml/Chromium/ Copper/Manganese/ Seleni/Zn 1 ml/ Insulin Human Regular 15 unit/ 

Total Parenteral Nutrition/Amino Acids/Dextrose/ Fat Emulsion Intravenous 1,920 

ml @  80 mls/hr TPN  CONT IV  Last administered on 5/14/20at 22:57;  Start 

5/14/20 at 22:00;  Stop 5/15/20 at 21:59;  Status DC


Potassium Chloride 90 meq/ Magnesium Sulfate 20 meq/ Multivitamins 10 ml/C

hromium/ Copper/Manganese/ Seleni/Zn 1 ml/ Insulin Human Regular 15 unit/ Total 

Parenteral Nutrition/Amino Acids/Dextrose/ Fat Emulsion Intravenous 1,920 ml @  

80 mls/hr TPN  CONT IV  Last administered on 5/15/20at 22:48;  Start 5/15/20 at 

22:00;  Stop 5/16/20 at 21:59;  Status DC


Potassium Chloride 90 meq/ Magnesium Sulfate 20 meq/ Multivitamins 10 

ml/Chromium/ Copper/Manganese/ Seleni/Zn 1 ml/ Insulin Human Regular 15 unit/ 

Total Parenteral Nutrition/Amino Acids/Dextrose/ Fat Emulsion Intravenous 1,890 

ml @  78.75 mls/ hr TPN  CONT IV  Last administered on 5/16/20at 22:15;  Start 

5/16/20 at 22:00;  Stop 5/17/20 at 21:59;  Status DC


Linezolid/Dextrose 300 ml @  300 mls/hr Q12HR IV  Last administered on 5/19/20at

21:08;  Start 5/17/20 at 09:00;  Stop 5/20/20 at 08:11;  Status DC


Daptomycin 450 mg/ Sodium Chloride 50 ml @  100 mls/hr Q24H IV  Last 

administered on 5/20/20at 09:25;  Start 5/17/20 at 09:00;  Stop 5/21/20 at 

08:30;  Status DC


Potassium Chloride 90 meq/ Magnesium Sulfate 20 meq/ Multivitamins 10 ml

/Chromium/ Copper/Manganese/ Seleni/Zn 1 ml/ Insulin Human Regular 15 unit/ 

Total Parenteral Nutrition/Amino Acids/Dextrose/ Fat Emulsion Intravenous 1,890 

ml @  78.75 mls/ hr TPN  CONT IV  Last administered on 5/17/20at 21:34;  Start 

5/17/20 at 22:00;  Stop 5/18/20 at 21:59;  Status DC


Lorazepam (Ativan Inj) 2 mg STK-MED ONCE .ROUTE ;  Start 5/17/20 at 14:58;  Stop

5/17/20 at 14:58;  Status DC


Metoprolol Tartrate (Lopressor Vial) 5 mg 1X  ONCE IVP  Last administered on 

5/17/20at 15:31;  Start 5/17/20 at 15:15;  Stop 5/17/20 at 15:16;  Status DC


Lorazepam (Ativan Inj) 2 mg 1X  ONCE IVP  Last administered on 5/17/20at 15:30; 

Start 5/17/20 at 15:15;  Stop 5/17/20 at 15:16;  Status DC


Enoxaparin Sodium (Lovenox 40mg Syringe) 40 mg Q24H SQ  Last administered on 

6/5/20at 17:44;  Start 5/17/20 at 17:00;  Stop 6/7/20 at 06:50;  Status DC


Lorazepam (Ativan Inj) 1 mg PRN Q4HRS  PRN IVP ANXIETY / AGITATION MILD-MOD Last

administered on 5/31/20at 15:55;  Start 5/17/20 at 19:15;  Stop 6/2/20 at 11:45;

 Status DC


Lorazepam (Ativan Inj) 2 mg PRN Q4HRS  PRN IVP ANXIETY / AGITATION SEVERE Last 

administered on 6/1/20at 07:55;  Start 5/17/20 at 19:15;  Stop 6/2/20 at 11:45; 

Status DC


Fentanyl Citrate (Fentanyl 2ml Vial) 50 mcg PRN Q4HRS  PRN IVP SEVERE PAIN Last 

administered on 6/13/20at 05:15;  Start 5/18/20 at 13:15;  Stop 6/14/20 at 

09:29;  Status DC


Fentanyl Citrate (Fentanyl 2ml Vial) 25 mcg PRN Q4HRS  PRN IVP MODERATE PAIN 

Last administered on 6/13/20at 00:27;  Start 5/18/20 at 13:15;  Stop 6/14/20 at 

09:30;  Status DC


Potassium Chloride 90 meq/ Magnesium Sulfate 20 meq/ Multivitamins 10 

ml/Chromium/ Copper/Manganese/ Seleni/Zn 1 ml/ Insulin Human Regular 15 unit/ 

Total Parenteral Nutrition/Amino Acids/Dextrose/ Fat Emulsion Intravenous 1,890 

ml @  78.75 mls/ hr TPN  CONT IV  Last administered on 5/18/20at 22:18;  Start 

5/18/20 at 22:00;  Stop 5/19/20 at 21:59;  Status DC


Furosemide (Lasix) 40 mg 1X  ONCE IVP  Last administered on 5/18/20at 21:51;  

Start 5/18/20 at 21:45;  Stop 5/18/20 at 21:48;  Status DC


Albumin Human 100 ml @  100 mls/hr 1X PRN  PRN IV SEE COMMENTS;  Start 5/19/20 

at 01:30


Furosemide (Lasix) 40 mg [id number] IVP  Last administered on 6/3/20at 08:04;  Start 

5/19/20 at 14:00;  Stop 6/3/20 at 13:07;  Status DC


Potassium Chloride 90 meq/ Magnesium Sulfate 20 meq/ Multivitamins 10 

ml/Chromium/ Copper/Manganese/ Seleni/Zn 1 ml/ Insulin Human Regular 15 unit/ 

Total Parenteral Nutrition/Amino Acids/Dextrose/ Fat Emulsion Intravenous 1,800 

ml @  75 mls/hr TPN  CONT IV  Last administered on 5/19/20at 22:31;  Start 5/19/ 20 at 22:00;  Stop 5/20/20 at 21:59;  Status DC


Potassium Chloride 90 meq/ Magnesium Sulfate 20 meq/ Multivitamins 10 ml/Chromiu

m/ Copper/Manganese/ Seleni/Zn 1 ml/ Insulin Human Regular 15 unit/ Total 

Parenteral Nutrition/Amino Acids/Dextrose/ Fat Emulsion Intravenous 1,800 ml @  

75 mls/hr TPN  CONT IV  Last administered on 5/20/20at 22:28;  Start 5/20/20 at 

22:00;  Stop 5/21/20 at 21:59;  Status DC


Potassium Chloride 110 meq/ Magnesium Sulfate 20 meq/ Multivitamins 10 

ml/Chromium/ Copper/Manganese/ Seleni/Zn 1 ml/ Insulin Human Regular 15 unit/ 

Total Parenteral Nutrition/Amino Acids/Dextrose/ Fat Emulsion Intravenous 1,800 

ml @  75 mls/hr TPN  CONT IV  Last administered on 5/21/20at 22:01;  Start 

5/21/20 at 22:00;  Stop 5/22/20 at 21:59;  Status DC


Saliva Substitute (Biotene Moisturizing Mouth) 2 spray PRN Q15MIN  PRN PO DRY 

MOUTH;  Start 5/21/20 at 11:00


Potassium Chloride 110 meq/ Magnesium Sulfate 20 meq/ Multivitamins 10 

ml/Chromium/ Copper/Manganese/ Seleni/Zn 1 ml/ Insulin Human Regular 15 unit/ 

Total Parenteral Nutrition/Amino Acids/Dextrose/ Fat Emulsion Intravenous 1,800 

ml @  75 mls/hr TPN  CONT IV  Last administered on 5/22/20at 22:21;  Start 

5/22/20 at 22:00;  Stop 5/23/20 at 21:59;  Status DC


Potassium Chloride 110 meq/ Magnesium Sulfate 20 meq/ Multivitamins 10 

ml/Chromium/ Copper/Manganese/ Seleni/Zn 1 ml/ Insulin Human Regular 15 unit/ 

Total Parenteral Nutrition/Amino Acids/Dextrose/ Fat Emulsion Intravenous 1,800 

ml @  75 mls/hr TPN  CONT IV  Last administered on 5/23/20at 22:04;  Start 5/23/ 20 at 22:00;  Stop 5/24/20 at 21:59;  Status DC


Potassium Chloride 110 meq/ Magnesium Sulfate 20 meq/ Multivitamins 10 ml/Chromi

um/ Copper/Manganese/ Seleni/Zn 1 ml/ Insulin Human Regular 15 unit/ Total 

Parenteral Nutrition/Amino Acids/Dextrose/ Fat Emulsion Intravenous 1,800 ml @  

75 mls/hr TPN  CONT IV  Last administered on 5/24/20at 22:48;  Start 5/24/20 at 

22:00;  Stop 5/25/20 at 21:59;  Status DC


Potassium Chloride 70 meq/ Magnesium Sulfate 20 meq/ Multivitamins 10 

ml/Chromium/ Copper/Manganese/ Seleni/Zn 1 ml/ Insulin Human Regular 15 unit/ 

Total Parenteral Nutrition/Amino Acids/Dextrose/ Fat Emulsion Intravenous 1,800 

ml @  75 mls/hr TPN  CONT IV  Last administered on 5/25/20at 21:39;  Start 

5/25/20 at 22:00;  Stop 5/26/20 at 21:59;  Status DC


Meropenem 500 mg/ Sodium Chloride 50 ml @  100 mls/hr Q6HRS IV  Last 

administered on 5/27/20at 06:02;  Start 5/25/20 at 18:00;  Stop 5/27/20 at 

09:59;  Status DC


Barium Sulfate (Varibar Thin Liquid Apple) 148 gm 1X  ONCE PO ;  Start 5/26/20 

at 11:45;  Stop 5/26/20 at 11:49;  Status DC


Potassium Chloride 70 meq/ Magnesium Sulfate 20 meq/ Multivitamins 10 

ml/Chromium/ Copper/Manganese/ Seleni/Zn 1 ml/ Insulin Human Regular 15 unit/ 

Total Parenteral Nutrition/Amino Acids/Dextrose/ Fat Emulsion Intravenous 1,800 

ml @  75 mls/hr TPN  CONT IV  Last administered on 5/26/20at 22:27;  Start 

5/26/20 at 22:00;  Stop 5/27/20 at 21:59;  Status DC


Piperacillin Sod/ Tazobactam Sod 3.375 gm/Sodium Chloride 50 ml @  100 mls/hr 

Q6HRS IV  Last administered on 6/4/20at 06:10;  Start 5/27/20 at 12:00;  Stop 

6/4/20 at 07:26;  Status DC


Potassium Chloride 70 meq/ Magnesium Sulfate 20 meq/ Multivitamins 10 ml/

mium/ Copper/Manganese/ Seleni/Zn 1 ml/ Insulin Human Regular 15 unit/ Total 

Parenteral Nutrition/Amino Acids/Dextrose/ Fat Emulsion Intravenous 1,800 ml @  

75 mls/hr TPN  CONT IV  Last administered on 5/27/20at 22:03;  Start 5/27/20 at 

22:00;  Stop 5/28/20 at 21:59;  Status DC


Potassium Chloride 70 meq/ Magnesium Sulfate 20 meq/ Multivitamins 10 

ml/Chromium/ Copper/Manganese/ Seleni/Zn 1 ml/ Insulin Human Regular 15 unit/ 

Total Parenteral Nutrition/Amino Acids/Dextrose/ Fat Emulsion Intravenous 1,800 

ml @  75 mls/hr TPN  CONT IV  Last administered on 5/28/20at 22:33;  Start 

5/28/20 at 22:00;  Stop 5/29/20 at 21:59;  Status DC


Potassium Chloride 70 meq/ Magnesium Sulfate 20 meq/ Multivitamins 10 

ml/Chromium/ Copper/Manganese/ Seleni/Zn 1 ml/ Insulin Human Regular 15 unit/ 

Total Parenteral Nutrition/Amino Acids/Dextrose/ Fat Emulsion Intravenous 1,800 

ml @  75 mls/hr TPN  CONT IV  Last administered on 5/29/20at 23:13;  Start 

5/29/20 at 22:00;  Stop 5/30/20 at 21:59;  Status DC


Potassium Chloride 80 meq/ Magnesium Sulfate 20 meq/ Multivitamins 10 

ml/Chromium/ Copper/Manganese/ Seleni/Zn 1 ml/ Insulin Human Regular 15 unit/ 

Total Parenteral Nutrition/Amino Acids/Dextrose/ Fat Emulsion Intravenous 1,800 

ml @  75 mls/hr TPN  CONT IV  Last administered on 5/30/20at 22:30;  Start 

5/30/20 at 22:00;  Stop 5/31/20 at 21:59;  Status DC


Potassium Chloride 80 meq/ Magnesium Sulfate 20 meq/ Multivitamins 10 

ml/Chromium/ Copper/Manganese/ Seleni/Zn 1 ml/ Insulin Human Regular 15 unit/ 

Total Parenteral Nutrition/Amino Acids/Dextrose/ Fat Emulsion Intravenous 1,800 

ml @  75 mls/hr TPN  CONT IV  Last administered on 5/31/20at 21:54;  Start 

5/31/20 at 22:00;  Stop 6/1/20 at 21:59;  Status DC


Potassium Chloride/Water 100 ml @  100 mls/hr 1X  ONCE IV  Last administered on 

6/1/20at 10:15;  Start 6/1/20 at 10:00;  Stop 6/1/20 at 10:59;  Status DC


Potassium Chloride 90 meq/ Magnesium Sulfate 20 meq/ Multivitamins 10 

ml/Chromium/ Copper/Manganese/ Seleni/Zn 1 ml/ Insulin Human Regular 20 unit/ 

Total Parenteral Nutrition/Amino Acids/Dextrose/ Fat Emulsion Intravenous 1,800 

ml @  75 mls/hr TPN  CONT IV  Last administered on 6/1/20at 22:28;  Start 6/1/20

at 22:00;  Stop 6/2/20 at 21:59;  Status DC


Potassium Chloride 90 meq/ Magnesium Sulfate 20 meq/ Multivitamins 10 

ml/Chromium/ Copper/Manganese/ Seleni/Zn 1 ml/ Insulin Human Regular 20 unit/ 

Total Parenteral Nutrition/Amino Acids/Dextrose/ Fat Emulsion Intravenous 1,800 

ml @  75 mls/hr TPN  CONT IV  Last administered on 6/2/20at 22:08;  Start 6/2/20

at 22:00;  Stop 6/3/20 at 21:59;  Status DC


Lorazepam (Ativan Inj) 0.25 mg PRN Q4HRS  PRN IVP ANXIETY / AGITATION Last 

administered on 6/27/20at 13:37;  Start 6/3/20 at 07:30


Potassium Chloride 90 meq/ Magnesium Sulfate 20 meq/ Multivitamins 10 

ml/Chromium/ Copper/Manganese/ Seleni/Zn 1 ml/ Insulin Human Regular 20 unit/ 

Total Parenteral Nutrition/Amino Acids/Dextrose/ Fat Emulsion Intravenous 1,800 

ml @  75 mls/hr TPN  CONT IV  Last administered on 6/3/20at 23:13;  Start 6/3/20

at 22:00;  Stop 6/4/20 at 21:59;  Status DC


Furosemide (Lasix) 40 mg DAILY IVP  Last administered on 6/5/20at 11:14;  Start 

6/3/20 at 13:30;  Stop 6/7/20 at 09:12;  Status DC


Fluoxetine HCl (PROzac) 20 mg QHS PEG  Last administered on 6/27/20at 21:52;  

Start 6/4/20 at 21:00


Fentanyl (Duragesic 50mcg/ Hr Patch) 1 patch Q72H TD  Last administered on 

6/4/20at 21:22;  Start 6/4/20 at 21:00;  Stop 6/13/20 at 12:00;  Status DC


Potassium Chloride 40 meq/ Potassium Acetate 60 meq/Magnesium Sulfate 10 meq/ 

Multivitamins 10 ml/Chromium/ Copper/Manganese/ Seleni/Zn 1 ml/ Insulin Human R

egular 20 unit/ Total Parenteral Nutrition/Amino Acids/Dextrose/ Fat Emulsion 

Intravenous 1,800 ml @  75 mls/hr TPN  CONT IV  Last administered on 6/5/20at 

00:03;  Start 6/4/20 at 22:00;  Stop 6/5/20 at 21:59;  Status DC


Potassium Acetate 80 meq/Magnesium Sulfate 5 meq/ Multivitamins 10 ml/Chromium/ 

Copper/Manganese/ Seleni/Zn 1 ml/ Insulin Human Regular 20 unit/ Total 

Parenteral Nutrition/Amino Acids/Dextrose/ Fat Emulsion Intravenous 1,920 ml @  

80 mls/hr TPN  CONT IV  Last administered on 6/5/20at 21:59;  Start 6/5/20 at 

22:00;  Stop 6/6/20 at 21:59;  Status DC


Potassium Acetate 60 meq/Magnesium Sulfate 5 meq/ Multivitamins 10 ml/Chromium/ 

Copper/Manganese/ Seleni/Zn 1 ml/ Insulin Human Regular 30 unit/ Total 

Parenteral Nutrition/Amino Acids/Dextrose/ Fat Emulsion Intravenous 1,920 ml @  

80 mls/hr TPN  CONT IV  Last administered on 6/6/20at 21:54;  Start 6/6/20 at 

22:00;  Stop 6/7/20 at 21:59;  Status DC


Norepinephrine Bitartrate 8 mg/ Dextrose 258 ml @  13.332 mls/ hr CONT  PRN IV 

PER PROTOCOL Last administered on 6/7/20at 21:46;  Start 6/7/20 at 06:30


Albumin Human 500 ml @  125 mls/hr 1X  ONCE IV  Last administered on 6/7/20at 0

8:10;  Start 6/7/20 at 08:15;  Stop 6/7/20 at 12:14;  Status DC


Potassium Acetate 40 meq/Magnesium Sulfate 5 meq/ Multivitamins 10 ml/Chromium/ 

Copper/Manganese/ Seleni/Zn 1 ml/ Insulin Human Regular 30 unit/ Total 

Parenteral Nutrition/Amino Acids/Dextrose/ Fat Emulsion Intravenous 1,920 ml @  

80 mls/hr TPN  CONT IV  Last administered on 6/7/20at 22:23;  Start 6/7/20 at 

22:00;  Stop 6/8/20 at 21:59;  Status DC


Meropenem 1 gm/ Sodium Chloride 100 ml @  200 mls/hr Q8HRS IV ;  Start 6/7/20 at

14:00;  Status Cancel


Meropenem 1 gm/ Sodium Chloride 100 ml @  200 mls/hr Q8HRS IV  Last administered

on 6/7/20at 11:04;  Start 6/7/20 at 10:00;  Stop 6/7/20 at 13:00;  Status DC


Meropenem 1 gm/ Sodium Chloride 100 ml @  200 mls/hr Q12HR IV  Last administered

on 6/25/20at 08:27;  Start 6/7/20 at 21:00;  Stop 6/25/20 at 08:56;  Status DC


Sodium Chloride 1,000 ml @  1,000 mls/hr 1X  ONCE IV  Last administered on 

6/7/20at 11:06;  Start 6/7/20 at 10:45;  Stop 6/7/20 at 11:44;  Status DC


Micafungin Sodium 100 mg/Dextrose 100 ml @  100 mls/hr Q24H IV  Last 

administered on 6/24/20at 12:34;  Start 6/7/20 at 11:00;  Stop 6/25/20 at 08:56;

 Status DC


Daptomycin 410 mg/ Sodium Chloride 50 ml @  100 mls/hr Q24H IV  Last 

administered on 6/9/20at 13:33;  Start 6/7/20 at 14:00;  Stop 6/10/20 at 08:30; 

Status DC


Midazolam HCl (Versed) 2 mg STK-MED ONCE .ROUTE ;  Start 6/7/20 at 14:47;  Stop 

6/7/20 at 14:48;  Status DC


Fentanyl Citrate (Fentanyl 2ml Vial) 100 mcg STK-MED ONCE .ROUTE ;  Start 6/7/20

at 14:47;  Stop 6/7/20 at 14:48;  Status DC


Flumazenil (Romazicon) 0.5 mg STK-MED ONCE IV ;  Start 6/7/20 at 14:48;  Stop 

6/7/20 at 14:48;  Status DC


Naloxone HCl (Narcan) 0.4 mg STK-MED ONCE .ROUTE ;  Start 6/7/20 at 14:48;  Stop

6/7/20 at 14:48;  Status DC


Lidocaine HCl (Lidocaine 1% 20ml Vial) 20 ml STK-MED ONCE .ROUTE ;  Start 6/7/20

at 14:48;  Stop 6/7/20 at 14:48;  Status DC


Midazolam HCl (Versed) 2 mg 1X  ONCE IV  Last administered on 6/7/20at 15:28;  

Start 6/7/20 at 15:00;  Stop 6/7/20 at 15:01;  Status DC


Fentanyl Citrate (Fentanyl 2ml Vial) 100 mcg 1X  ONCE IV  Last administered on 

6/7/20at 15:28;  Start 6/7/20 at 15:00;  Stop 6/7/20 at 15:01;  Status DC


Lidocaine HCl (Lidocaine 1% 20ml Vial) 20 ml 1X  ONCE INJ  Last administered on 

6/7/20at 15:30;  Start 6/7/20 at 15:00;  Stop 6/7/20 at 15:01;  Status DC


Sodium Chloride 1,000 ml @  100 mls/hr Q10H IV  Last administered on 6/16/20at 

07:30;  Start 6/7/20 at 20:00;  Stop 6/16/20 at 11:26;  Status DC


Sodium Bicarbonate (Sodium Bicarb Adult 8.4% Syr) 50 meq 1X  ONCE IV  Last 

administered on 6/7/20at 21:47;  Start 6/7/20 at 22:00;  Stop 6/7/20 at 22:01;  

Status DC


Potassium Acetate 40 meq/Magnesium Sulfate 5 meq/ Multivitamins 10 ml/Chromium/ 

Copper/Manganese/ Seleni/Zn 1 ml/ Insulin Human Regular 30 unit/ Total 

Parenteral Nutrition/Amino Acids/Dextrose/ Fat Emulsion Intravenous 1,920 ml @  

80 mls/hr TPN  CONT IV  Last administered on 6/8/20at 22:28;  Start 6/8/20 at 

22:00;  Stop 6/9/20 at 21:59;  Status DC


Sodium Chloride 500 ml @  500 mls/hr 1X  ONCE IV  Last administered on 6/9/20at 

06:39;  Start 6/9/20 at 06:45;  Stop 6/9/20 at 07:44;  Status DC


Potassium Acetate 40 meq/Magnesium Sulfate 5 meq/ Multivitamins 10 ml/Chromium/ 

Copper/Manganese/ Seleni/Zn 1 ml/ Insulin Human Regular 30 unit/ Total 

Parenteral Nutrition/Amino Acids/Dextrose/ Fat Emulsion Intravenous 1,920 ml @  

80 mls/hr TPN  CONT IV  Last administered on 6/9/20at 22:03;  Start 6/9/20 at 

22:00;  Stop 6/10/20 at 21:59;  Status DC


Metoprolol Tartrate (Lopressor Vial) 5 mg PRN Q6HRS  PRN IVP HYPERTENSION Last 

administered on 6/18/20at 10:14;  Start 6/10/20 at 09:00


Potassium Acetate 40 meq/Magnesium Sulfate 5 meq/ Multivitamins 10 ml/Chromium/ 

Copper/Manganese/ Seleni/Zn 1 ml/ Insulin Human Regular 30 unit/ Total 

Parenteral Nutrition/Amino Acids/Dextrose/ Fat Emulsion Intravenous 1,920 ml @  

80 mls/hr TPN  CONT IV  Last administered on 6/10/20at 21:26;  Start 6/10/20 at 

22:00;  Stop 6/11/20 at 21:59;  Status DC


Potassium Acetate 40 meq/Magnesium Sulfate 5 meq/ Multivitamins 10 ml/Chromium/ 

Copper/Manganese/ Seleni/Zn 1 ml/ Insulin Human Regular 30 unit/ Total 

Parenteral Nutrition/Amino Acids/Dextrose/ Fat Emulsion Intravenous 1,920 ml @  

80 mls/hr TPN  CONT IV  Last administered on 6/11/20at 23:23;  Start 6/11/20 at 

22:00;  Stop 6/12/20 at 21:59;  Status DC


Potassium Acetate 40 meq/Magnesium Sulfate 5 meq/ Multivitamins 10 ml/Chromium/ 

Copper/Manganese/ Seleni/Zn 1 ml/ Insulin Human Regular 30 unit/ Total 

Parenteral Nutrition/Amino Acids/Dextrose/ Fat Emulsion Intravenous 1,920 ml @  

80 mls/hr TPN  CONT IV  Last administered on 6/12/20at 21:35;  Start 6/12/20 at 

22:00;  Stop 6/13/20 at 21:59;  Status DC


Furosemide (Lasix) 20 mg 1X  ONCE IVP  Last administered on 6/13/20at 06:26;  

Start 6/13/20 at 06:15;  Stop 6/13/20 at 06:16;  Status DC


Methylprednisolone Sodium Succinate (SOLU-Medrol 125MG VIAL) 125 mg 1X  ONCE IV 

Last administered on 6/13/20at 06:26;  Start 6/13/20 at 06:15;  Stop 6/13/20 at 

06:16;  Status DC


Albuterol/ Ipratropium (Duoneb) 3 ml Q4HRS NEB  Last administered on 6/30/20at 

08:30;  Start 6/13/20 at 08:00


Fentanyl Citrate 30 ml @ 0 mls/hr CONT  PRN IV SEE PROTOCOL Last administered on

6/29/20at 13:41;  Start 6/13/20 at 06:00


Propofol 100 ml @ 0 mls/hr CONT  PRN IV SEE PROTOCOL Last administered on 

6/20/20at 23:50;  Start 6/13/20 at 06:00


Fentanyl Citrate (Fentanyl 2ml Vial) 25 mcg PRN Q1HR  PRN IV SEE COMMENTS;  

Start 6/13/20 at 06:00


Fentanyl Citrate (Fentanyl 2ml Vial) 50 mcg PRN Q1HR  PRN IV SEE COMMENTS;  

Start 6/13/20 at 06:00


Chlorhexidine Gluconate (Peridex) 15 ml BID MM ;  Start 6/13/20 at 09:00;  Stop 

6/13/20 at 07:58;  Status DC


Potassium Acetate 40 meq/Magnesium Sulfate 5 meq/ Multivitamins 10 ml/Chromium/ 

Copper/Manganese/ Seleni/Zn 1 ml/ Insulin Human Regular 30 unit/ Total 

Parenteral Nutrition/Amino Acids/Dextrose/ Fat Emulsion Intravenous 1,920 ml @  

80 mls/hr TPN  CONT IV  Last administered on 6/13/20at 21:19;  Start 6/13/20 at 

22:00;  Stop 6/14/20 at 21:59;  Status DC


Acetylcysteine (Mucomyst 20% Resp Treatment) 600 mg BID NEB  Last administered 

on 6/19/20at 09:33;  Start 6/13/20 at 21:00;  Stop 6/19/20 at 10:39;  Status DC


Magnesium Sulfate 100 ml @  25 mls/hr 1X  ONCE IV  Last administered on 

6/13/20at 15:48;  Start 6/13/20 at 15:45;  Stop 6/13/20 at 19:44;  Status DC


Potassium Acetate 40 meq/Magnesium Sulfate 5 meq/ Multivitamins 10 ml/Chromium/ 

Copper/Manganese/ Seleni/Zn 1 ml/ Insulin Human Regular 30 unit/ Total 

Parenteral Nutrition/Amino Acids/Dextrose/ Fat Emulsion Intravenous 1,920 ml @  

80 mls/hr TPN  CONT IV  Last administered on 6/14/20at 21:35;  Start 6/14/20 at 

22:00;  Stop 6/15/20 at 21:59;  Status DC


Potassium Chloride/Water 100 ml @  100 mls/hr Q1H IV  Last administered on 

6/15/20at 08:31;  Start 6/15/20 at 07:00;  Stop 6/15/20 at 08:59;  Status DC


Potassium Acetate 40 meq/Magnesium Sulfate 5 meq/ Multivitamins 10 ml/Chromium/ 

Copper/Manganese/ Seleni/Zn 1 ml/ Insulin Human Regular 30 unit/ Total 

Parenteral Nutrition/Amino Acids/Dextrose/ Fat Emulsion Intravenous 1,920 ml @  

80 mls/hr TPN  CONT IV  Last administered on 6/15/20at 21:54;  Start 6/15/20 at 

22:00;  Stop 6/16/20 at 19:34;  Status DC


Lidocaine HCl (Buffered Lidocaine 1%) 3 ml STK-MED ONCE .ROUTE ;  Start 6/15/20 

at 12:14;  Stop 6/15/20 at 12:14;  Status DC


Lidocaine HCl (Buffered Lidocaine 1%) 3 ml 1X  ONCE IJ  Last administered on 

6/15/20at 13:11;  Start 6/15/20 at 13:00;  Stop 6/15/20 at 13:01;  Status DC


Magnesium Sulfate 50 ml @ 25 mls/hr 1X  ONCE IV ;  Start 6/16/20 at 08:15;  Stop

6/16/20 at 10:14;  Status DC


Potassium Acetate 40 meq/Magnesium Sulfate 10 meq/ Multivitamins 10 ml/Chromium/

Copper/Manganese/ Seleni/Zn 1 ml/ Insulin Human Regular 20 unit/ Total 

Parenteral Nutrition/Amino Acids/Dextrose/ Fat Emulsion Intravenous 1,920 ml @  

80 mls/hr TPN  CONT IV  Last administered on 6/16/20at 21:32;  Start 6/16/20 at 

22:00;  Stop 6/17/20 at 21:59;  Status DC


Potassium Chloride/Water 100 ml @  100 mls/hr Q1H IV  Last administered on 

6/17/20at 09:12;  Start 6/17/20 at 08:00;  Stop 6/17/20 at 09:59;  Status DC


Alteplase, Recombinant (Cathflo For Central Catheter Clearance) 4 mg 1X  ONCE 

INT CAT ;  Start 6/17/20 at 09:15;  Stop 6/17/20 at 09:16;  Status UNV


Alteplase, Recombinant (Cathflo For Central Catheter Clearance) 4 mg 1X  ONCE 

INT CAT ;  Start 6/17/20 at 09:15;  Stop 6/17/20 at 09:16;  Status UNV


Alteplase, Recombinant (Cathflo For Central Catheter Clearance) 4 mg 1X  ONCE 

INT CAT ;  Start 6/17/20 at 09:15;  Stop 6/17/20 at 09:16;  Status UNV


Alteplase, Recombinant 4 mg/ Sodium Chloride 20 ml @ 20 mls/hr 1X  ONCE IV  Last

administered on 6/17/20at 10:10;  Start 6/17/20 at 10:00;  Stop 6/17/20 at 

10:59;  Status DC


Alteplase, Recombinant 4 mg/ Sodium Chloride 20 ml @ 20 mls/hr 1X  ONCE IV  Last

administered on 6/17/20at 10:09;  Start 6/17/20 at 10:00;  Stop 6/17/20 at 

10:59;  Status DC


Alteplase, Recombinant 4 mg/ Sodium Chloride 20 ml @ 20 mls/hr 1X  ONCE IV  Last

administered on 6/17/20at 10:09;  Start 6/17/20 at 10:00;  Stop 6/17/20 at 

10:59;  Status DC


Potassium Acetate 60 meq/Magnesium Sulfate 10 meq/ Multivitamins 10 ml/Chromium/

Copper/Manganese/ Seleni/Zn 1 ml/ Insulin Human Regular 20 unit/ Total 

Parenteral Nutrition/Amino Acids/Dextrose/ Fat Emulsion Intravenous 1,920 ml @  

80 mls/hr TPN  CONT IV  Last administered on 6/17/20at 21:55;  Start 6/17/20 at 

22:00;  Stop 6/18/20 at 21:59;  Status DC


Albumin Human 500 ml @  125 mls/hr 1X  ONCE IV  Last administered on 6/18/20at 

12:01;  Start 6/18/20 at 11:15;  Stop 6/18/20 at 15:14;  Status DC


Sodium Chloride 500 ml @  500 mls/hr 1X  ONCE IV  Last administered on 6/18/20at

13:50;  Start 6/18/20 at 11:15;  Stop 6/18/20 at 12:14;  Status DC


Potassium Acetate 60 meq/Magnesium Sulfate 14 meq/ Multivitamins 10 ml/Chromium/

Copper/Manganese/ Seleni/Zn 1 ml/ Insulin Human Regular 20 unit/ Total 

Parenteral Nutrition/Amino Acids/Dextrose/ Fat Emulsion Intravenous 1,920 ml @  

80 mls/hr TPN  CONT IV  Last administered on 6/18/20at 22:26;  Start 6/18/20 at 

22:00;  Stop 6/19/20 at 21:59;  Status DC


Ciprofloxacin/ Dextrose 200 ml @  200 mls/hr Q12HR IV  Last administered on 

6/25/20at 08:27;  Start 6/18/20 at 21:00;  Stop 6/25/20 at 08:56;  Status DC


Albumin Human 250 ml @  62.5 mls/hr 1X  ONCE IV  Last administered on 6/19/20at 

11:09;  Start 6/19/20 at 11:00;  Stop 6/19/20 at 14:59;  Status DC


Furosemide (Lasix) 20 mg 1X  ONCE IVP  Last administered on 6/19/20at 14:52;  

Start 6/19/20 at 10:45;  Stop 6/19/20 at 10:49;  Status DC


Potassium Acetate 60 meq/Magnesium Sulfate 14 meq/ Multivitamins 10 ml/Chromium/

Copper/Manganese/ Seleni/Zn 1 ml/ Insulin Human Regular 15 unit/ Total Parenter

al Nutrition/Amino Acids/Dextrose/ Fat Emulsion Intravenous 1,920 ml @  80 

mls/hr TPN  CONT IV  Last administered on 6/19/20at 22:08;  Start 6/19/20 at 

22:00;  Stop 6/20/20 at 21:59;  Status DC


Potassium Acetate 60 meq/Magnesium Sulfate 14 meq/ Multivitamins 10 ml/Chromium/

Copper/Manganese/ Seleni/Zn 1 ml/ Insulin Human Regular 15 unit/ Total 

Parenteral Nutrition/Amino Acids/Dextrose/ Fat Emulsion Intravenous 1,920 ml @  

80 mls/hr TPN  CONT IV  Last administered on 6/20/20at 22:12;  Start 6/20/20 at 

22:00;  Stop 6/21/20 at 21:59;  Status DC


Potassium Acetate 60 meq/Magnesium Sulfate 14 meq/ Multivitamins 10 ml/Chromium/

Copper/Manganese/ Seleni/Zn 1 ml/ Insulin Human Regular 15 unit/ Total 

Parenteral Nutrition/Amino Acids/Dextrose/ Fat Emulsion Intravenous 1,920 ml @  

80 mls/hr TPN  CONT IV  Last administered on 6/21/20at 22:22;  Start 6/21/20 at 

22:00;  Stop 6/22/20 at 21:59;  Status DC


Furosemide (Lasix) 20 mg 1X  ONCE IVP  Last administered on 6/22/20at 11:07;  

Start 6/22/20 at 10:30;  Stop 6/22/20 at 10:34;  Status DC


Potassium Acetate 60 meq/Magnesium Sulfate 14 meq/ Multivitamins 10 ml/Chromium/

Copper/Manganese/ Seleni/Zn 1 ml/ Insulin Human Regular 15 unit/ Sodium Chloride

20 meq/Total Parenteral Nutrition/Amino Acids/Dextrose/ Fat Emulsion Intravenous

1,920 ml @  80 mls/hr TPN  CONT IV  Last administered on 6/22/20at 21:54;  Start

6/22/20 at 22:00;  Stop 6/23/20 at 21:59;  Status DC


Potassium Acetate 30 meq/Magnesium Sulfate 14 meq/ Multivitamins 10 ml/Chromium/

Copper/Manganese/ Seleni/Zn 1 ml/ Insulin Human Regular 15 unit/ Sodium Chloride

20 meq/Potassium Chloride 30 meq/ Total Parenteral Nutrition/Amino 

Acids/Dextrose/ Fat Emulsion Intravenous 1,920 ml @  80 mls/hr TPN  CONT IV  

Last administered on 6/23/20at 21:46;  Start 6/23/20 at 22:00;  Stop 6/24/20 at 

21:59;  Status DC


Sodium Chloride 80 meq/Potassium Chloride 30 meq/ Potassium Acetate 30 

meq/Magnesium Sulfate 14 meq/ Multivitamins 10 ml/Chromium/ Copper/Manganese/ 

Seleni/Zn 1 ml/ Insulin Human Regular 15 unit/ Total Parenteral Nutrition/Amino 

Acids/Dextrose/ Fat Emulsion Intravenous 1,920 ml @  80 mls/hr TPN  CONT IV  

Last administered on 6/24/20at 22:33;  Start 6/24/20 at 22:00;  Stop 6/25/20 at 

21:59;  Status DC


Furosemide (Lasix) 40 mg 1X  ONCE IVP  Last administered on 6/24/20at 16:27;  St

art 6/24/20 at 15:30;  Stop 6/24/20 at 15:33;  Status DC


Albumin Human 250 ml @  62.5 mls/hr 1X  ONCE IV  Last administered on 6/24/20at 

16:27;  Start 6/24/20 at 15:30;  Stop 6/24/20 at 19:29;  Status DC


Sodium Chloride 80 meq/Potassium Chloride 30 meq/ Potassium Acetate 30 

meq/Magnesium Sulfate 14 meq/ Multivitamins 10 ml/Chromium/ Copper/Manganese/ 

Seleni/Zn 1 ml/ Insulin Human Regular 15 unit/ Total Parenteral Nutrition/Amino 

Acids/Dextrose/ Fat Emulsion Intravenous 1,920 ml @  80 mls/hr TPN  CONT IV  La

st administered on 6/25/20at 22:25;  Start 6/25/20 at 22:00;  Stop 6/26/20 at 

21:59;  Status DC


Sodium Chloride 80 meq/Potassium Chloride 30 meq/ Potassium Acetate 30 

meq/Magnesium Sulfate 14 meq/ Multivitamins 10 ml/Chromium/ Copper/Manganese/ 

Seleni/Zn 1 ml/ Insulin Human Regular 15 unit/ Total Parenteral Nutrition/Amino 

Acids/Dextrose/ Fat Emulsion Intravenous 1,920 ml @  80 mls/hr TPN  CONT IV  

Last administered on 6/26/20at 21:32;  Start 6/26/20 at 22:00;  Stop 6/27/20 at 

21:59;  Status DC


Sodium Chloride 80 meq/Potassium Chloride 30 meq/ Potassium Acetate 30 

meq/Magnesium Sulfate 14 meq/ Multivitamins 10 ml/Chromium/ Copper/Manganese/ 

Seleni/Zn 1 ml/ Insulin Human Regular 15 unit/ Total Parenteral Nutrition/Amino 

Acids/Dextrose/ Fat Emulsion Intravenous 1,920 ml @  80 mls/hr TPN  CONT IV  

Last administered on 6/27/20at 21:53;  Start 6/27/20 at 22:00;  Stop 6/28/20 at 

21:59;  Status DC


Acetylcysteine (Mucomyst 20% Resp Treatment) 600 mg RTBID NEB  Last administered

on 6/30/20at 08:30;  Start 6/27/20 at 12:00


Sodium Chloride 80 meq/Potassium Chloride 30 meq/ Potassium Acetate 30 

meq/Magnesium Sulfate 14 meq/ Multivitamins 10 ml/Chromium/ Copper/Manganese/ 

Seleni/Zn 1 ml/ Insulin Human Regular 15 unit/ Total Parenteral Nutrition/Amino 

Acids/Dextrose/ Fat Emulsion Intravenous 1,920 ml @  80 mls/hr TPN  CONT IV  

Last administered on 6/28/20at 22:06;  Start 6/28/20 at 22:00;  Stop 6/29/20 at 

21:59;  Status DC


Meropenem 500 mg/ Sodium Chloride 50 ml @  100 mls/hr Q6HRS IV  Last 

administered on 6/30/20at 06:14;  Start 6/28/20 at 18:00


Daptomycin 500 mg/ Sodium Chloride 50 ml @  100 mls/hr Q24H IV  Last 

administered on 6/29/20at 18:19;  Start 6/28/20 at 19:00


Sodium Chloride 80 meq/Potassium Chloride 30 meq/ Potassium Acetate 30 

meq/Magnesium Sulfate 14 meq/ Multivitamins 10 ml/Chromium/ Copper/Manganese/ 

Seleni/Zn 1 ml/ Insulin Human Regular 15 unit/ Total Parenteral Nutrition/Amino 

Acids/Dextrose/ Fat Emulsion Intravenous 1,920 ml @  80 mls/hr TPN  CONT IV  

Last administered on 6/29/20at 22:09;  Start 6/29/20 at 22:00;  Stop 6/30/20 at 

21:59


Heparin Sodium (Porcine) 1000 unit/Sodium Chloride 1,001 ml @  1,001 mls/hr 1X  

ONCE IRR ;  Start 6/30/20 at 06:00;  Stop 6/30/20 at 06:59;  Status DC


Propofol (Diprivan) 200 mg STK-MED ONCE IV ;  Start 6/30/20 at 07:44;  Stop 

6/30/20 at 07:44;  Status DC


Lidocaine HCl (Lidocaine Pf 2% Vial) 5 ml STK-MED ONCE .ROUTE ;  Start 6/30/20 

at 07:44;  Stop 6/30/20 at 07:44;  Status DC


Fentanyl Citrate (Fentanyl 2ml Vial) 100 mcg STK-MED ONCE .ROUTE ;  Start 

6/30/20 at 07:44;  Stop 6/30/20 at 07:44;  Status DC


Rocuronium Bromide (Zemuron) 100 mg STK-MED ONCE .ROUTE ;  Start 6/30/20 at 

07:44;  Stop 6/30/20 at 07:44;  Status DC


Micafungin Sodium 100 mg/Dextrose 100 ml @  100 mls/hr Q24H IV  Last 

administered on 6/30/20at 08:21;  Start 6/30/20 at 08:30


Bupivacaine HCl/ Epinephrine Bitart (Sensorcain-Epi 0.5%-1:058444 Mpf) 30 ml 

STK-MED ONCE .ROUTE ;  Start 6/30/20 at 08:34;  Stop 6/30/20 at 08:35;  Status 

DC


Iohexol (Omnipaque 300 Mg/ml) 50 ml STK-MED ONCE .ROUTE ;  Start 6/30/20 at 

08:35;  Stop 6/30/20 at 08:35;  Status DC


Sodium Chloride 80 meq/Potassium Chloride 30 meq/ Potassium Acetate 30 

meq/Magnesium Sulfate 14 meq/ Multivitamins 10 ml/Chromium/ Copper/Manganese/ 

Seleni/Zn 1 ml/ Insulin Human Regular 15 unit/ Total Parenteral Nutrition/Amino 

Acids/Dextrose/ Fat Emulsion Intravenous 1,920 ml @  80 mls/hr TPN  CONT IV ;  

Start 6/30/20 at 22:00;  Stop 7/1/20 at 21:59





Active Scripts


Active


Reported


Bisoprolol Fumarate 5 Mg Tablet 10 Mg PO DAILY


Vitals/I & O





Vital Sign - Last 24 Hours








 6/29/20 6/29/20 6/29/20 6/29/20





 10:00 11:00 11:55 12:00


 


Temp    98.4





    98.4


 


Pulse 116 118  125


 


Resp 30 26  20


 


B/P (MAP) 116/72 (87) 123/73 (90)  131/68 (89)


 


Pulse Ox 99 99 100 99


 


O2 Delivery Tracheal Collar Tracheal Collar Tracheal Collar Tracheal Collar


 


O2 Flow Rate   8.0 


 


    





    





 6/29/20 6/29/20 6/29/20 6/29/20





 12:00 13:00 13:41 14:00


 


Pulse  129  124


 


Resp  27 25 23


 


B/P (MAP)  121/73 (89)  133/88 (103)


 


Pulse Ox  97 97 100


 


O2 Delivery Trach Collar Tracheal Collar Tracheal Collar Tracheal Collar





 6/29/20 6/29/20 6/29/20 6/29/20





 15:00 16:00 16:00 16:22


 


Pulse 133 132  


 


Resp 23 31  


 


B/P (MAP) 134/79 (97) 119/73 (88)  


 


Pulse Ox 100 99  99


 


O2 Delivery Tracheal Collar Tracheal Collar Trach Collar Tracheal Collar


 


O2 Flow Rate    8.0





 6/29/20 6/29/20 6/29/20 6/29/20





 17:00 18:00 19:00 20:00


 


Temp  102.0  





  102.0  


 


Pulse 128 132 130 


 


Resp 29 33 33 


 


B/P (MAP) 112/55 (74) 121/64 (83) 113/68 (83) 


 


Pulse Ox 97 92 96 


 


O2 Delivery Tracheal Collar Tracheal Collar Tracheal Collar Trach Collar


 


O2 Flow Rate    8.0


 


    





    





 6/29/20 6/29/20 6/29/20 6/29/20





 20:00 20:15 21:00 22:00


 


Temp 101.0   





 101.0   


 


Pulse 130  128 118


 


Resp 23  27 22


 


B/P (MAP) 128/75 (92)  110/65 (80) 104/64 (77)


 


Pulse Ox 99 98 96 97


 


O2 Delivery Tracheal Collar Tracheal Collar Tracheal Collar Tracheal Collar


 


O2 Flow Rate  8.0  


 


    





    





 6/29/20 6/29/20 6/30/20 6/30/20





 23:00 23:44 00:00 00:00


 


Temp    98.6





    98.6


 


Pulse 109   112


 


Resp 24   28


 


B/P (MAP) 109/64 (79)   105/63 (77)


 


Pulse Ox 97 99  99


 


O2 Delivery Tracheal Collar Tracheal Collar Trach Collar Tracheal Collar


 


O2 Flow Rate  8.0 8.0 


 


    





    





 6/30/20 6/30/20 6/30/20 6/30/20





 01:00 02:00 03:00 04:00


 


Temp    98.9





    98.9


 


Pulse 107 113 118 114


 


Resp 24 28 21 26


 


B/P (MAP) 95/60 (72) 116/71 (86) 123/72 (89) 123/77 (92)


 


Pulse Ox 100 100 98 97


 


O2 Delivery Tracheal Collar Tracheal Collar Tracheal Collar Tracheal Collar


 


    





    





 6/30/20 6/30/20 6/30/20 6/30/20





 04:00 05:00 06:00 07:00


 


Pulse  114 117 117


 


Resp  29 25 21


 


B/P (MAP)  125/75 (92) 114/84 (94) 168/99 (122)


 


Pulse Ox  99 100 99


 


O2 Delivery Trach Collar Tracheal Collar Tracheal Collar Tracheal Collar


 


O2 Flow Rate 8.0   





 6/30/20 6/30/20  





 08:00 08:30  


 


Temp 98.9   





 98.9   


 


Pulse 119   


 


Resp 22   


 


B/P (MAP) 128/77 (94)   


 


Pulse Ox 99 99  


 


O2 Delivery Tracheal Collar Tracheal Collar  


 


O2 Flow Rate  8.0  














Intake and Output   


 


 6/29/20 6/29/20 6/30/20





 15:00 23:00 07:00


 


Intake Total   2354 ml


 


Output Total 700 ml 635 ml 1105 ml


 


Balance -700 ml -635 ml 1249 ml











Hemodynamically unstable?:  No


Is patient in severe pain?:  No


Is NPO status required?:  No











OVIDIO SARAVIA MD          Jun 30, 2020 09:21

## 2020-07-01 VITALS — DIASTOLIC BLOOD PRESSURE: 66 MMHG | SYSTOLIC BLOOD PRESSURE: 112 MMHG

## 2020-07-01 VITALS — SYSTOLIC BLOOD PRESSURE: 118 MMHG | DIASTOLIC BLOOD PRESSURE: 58 MMHG

## 2020-07-01 VITALS — DIASTOLIC BLOOD PRESSURE: 64 MMHG | SYSTOLIC BLOOD PRESSURE: 91 MMHG

## 2020-07-01 VITALS — SYSTOLIC BLOOD PRESSURE: 105 MMHG | DIASTOLIC BLOOD PRESSURE: 66 MMHG

## 2020-07-01 VITALS — SYSTOLIC BLOOD PRESSURE: 103 MMHG | DIASTOLIC BLOOD PRESSURE: 65 MMHG

## 2020-07-01 VITALS — SYSTOLIC BLOOD PRESSURE: 90 MMHG | DIASTOLIC BLOOD PRESSURE: 48 MMHG

## 2020-07-01 VITALS — DIASTOLIC BLOOD PRESSURE: 61 MMHG | SYSTOLIC BLOOD PRESSURE: 120 MMHG

## 2020-07-01 VITALS — DIASTOLIC BLOOD PRESSURE: 49 MMHG | SYSTOLIC BLOOD PRESSURE: 106 MMHG

## 2020-07-01 VITALS — SYSTOLIC BLOOD PRESSURE: 95 MMHG | DIASTOLIC BLOOD PRESSURE: 60 MMHG

## 2020-07-01 VITALS — SYSTOLIC BLOOD PRESSURE: 92 MMHG | DIASTOLIC BLOOD PRESSURE: 50 MMHG

## 2020-07-01 VITALS — DIASTOLIC BLOOD PRESSURE: 55 MMHG | SYSTOLIC BLOOD PRESSURE: 90 MMHG

## 2020-07-01 VITALS — DIASTOLIC BLOOD PRESSURE: 57 MMHG | SYSTOLIC BLOOD PRESSURE: 97 MMHG

## 2020-07-01 VITALS — SYSTOLIC BLOOD PRESSURE: 117 MMHG | DIASTOLIC BLOOD PRESSURE: 60 MMHG

## 2020-07-01 VITALS — DIASTOLIC BLOOD PRESSURE: 57 MMHG | SYSTOLIC BLOOD PRESSURE: 108 MMHG

## 2020-07-01 VITALS — DIASTOLIC BLOOD PRESSURE: 62 MMHG | SYSTOLIC BLOOD PRESSURE: 103 MMHG

## 2020-07-01 VITALS — DIASTOLIC BLOOD PRESSURE: 66 MMHG | SYSTOLIC BLOOD PRESSURE: 105 MMHG

## 2020-07-01 VITALS — DIASTOLIC BLOOD PRESSURE: 55 MMHG | SYSTOLIC BLOOD PRESSURE: 122 MMHG

## 2020-07-01 VITALS — DIASTOLIC BLOOD PRESSURE: 50 MMHG | SYSTOLIC BLOOD PRESSURE: 86 MMHG

## 2020-07-01 VITALS — SYSTOLIC BLOOD PRESSURE: 91 MMHG | DIASTOLIC BLOOD PRESSURE: 58 MMHG

## 2020-07-01 VITALS — SYSTOLIC BLOOD PRESSURE: 110 MMHG | DIASTOLIC BLOOD PRESSURE: 61 MMHG

## 2020-07-01 VITALS — DIASTOLIC BLOOD PRESSURE: 61 MMHG | SYSTOLIC BLOOD PRESSURE: 104 MMHG

## 2020-07-01 VITALS — SYSTOLIC BLOOD PRESSURE: 96 MMHG | DIASTOLIC BLOOD PRESSURE: 45 MMHG

## 2020-07-01 VITALS — DIASTOLIC BLOOD PRESSURE: 52 MMHG | SYSTOLIC BLOOD PRESSURE: 92 MMHG

## 2020-07-01 VITALS — SYSTOLIC BLOOD PRESSURE: 100 MMHG | DIASTOLIC BLOOD PRESSURE: 61 MMHG

## 2020-07-01 LAB
ANION GAP SERPL CALC-SCNC: 8 MMOL/L (ref 6–14)
BASE EXCESS ABG: -3 MMOL/L (ref -3–3)
BASE EXCESS ABG: 0 MMOL/L (ref -3–3)
BASOPHILS # BLD AUTO: 0.1 X10^3/UL (ref 0–0.2)
BASOPHILS NFR BLD: 0 % (ref 0–3)
BUN SERPL-MCNC: 19 MG/DL (ref 7–20)
CALCIUM SERPL-MCNC: 9.2 MG/DL (ref 8.5–10.1)
CHLORIDE SERPL-SCNC: 104 MMOL/L (ref 98–107)
CO2 SERPL-SCNC: 26 MMOL/L (ref 21–32)
CREAT SERPL-MCNC: 0.8 MG/DL (ref 0.6–1)
EOSINOPHIL NFR BLD: 0 % (ref 0–3)
EOSINOPHIL NFR BLD: 0 X10^3/UL (ref 0–0.7)
ERYTHROCYTE [DISTWIDTH] IN BLOOD BY AUTOMATED COUNT: 14.8 % (ref 11.5–14.5)
GFR SERPLBLD BASED ON 1.73 SQ M-ARVRAT: 76.2 ML/MIN
GLUCOSE SERPL-MCNC: 264 MG/DL (ref 70–99)
HCO3 BLDA-SCNC: 21 MMOL/L (ref 21–28)
HCO3 BLDA-SCNC: 22 MMOL/L (ref 21–28)
HCT VFR BLD CALC: 21.4 % (ref 36–47)
HCT VFR BLD CALC: 21.6 % (ref 36–47)
HGB BLD-MCNC: 7.3 G/DL (ref 12–15.5)
HGB BLD-MCNC: 7.5 G/DL (ref 12–15.5)
INSPIRATION SETTING TIME VENT: 40
INSPIRATION SETTING TIME VENT: 40
LYMPHOCYTES # BLD: 1.5 X10^3/UL (ref 1–4.8)
LYMPHOCYTES NFR BLD AUTO: 8 % (ref 24–48)
MAGNESIUM SERPL-MCNC: 2.1 MG/DL (ref 1.8–2.4)
MCH RBC QN AUTO: 29 PG (ref 25–35)
MCHC RBC AUTO-ENTMCNC: 34 G/DL (ref 31–37)
MCHC RBC AUTO-ENTMCNC: 35 G/DL (ref 31–37)
MCV RBC AUTO: 85 FL (ref 79–100)
MONO #: 1.2 X10^3/UL (ref 0–1.1)
MONOCYTES NFR BLD: 6 % (ref 0–9)
NEUT #: 16.1 X10^3/UL (ref 1.8–7.7)
NEUTROPHILS NFR BLD AUTO: 85 % (ref 31–73)
PCO2 BLDA: 22 MMHG (ref 35–46)
PCO2 BLDA: 38 MMHG (ref 35–46)
PHOSPHATE SERPL-MCNC: 3.5 MG/DL (ref 2.6–4.7)
PLATELET # BLD AUTO: 253 X10^3/UL (ref 140–400)
PO2 BLDA: 111 MMHG (ref 75–108)
PO2 BLDA: 120 MMHG (ref 75–108)
POTASSIUM SERPL-SCNC: 3.9 MMOL/L (ref 3.5–5.1)
RBC # BLD AUTO: 2.55 X10^6/UL (ref 3.5–5.4)
SAO2 % BLDA: 97 % (ref 92–99)
SAO2 % BLDA: 98 % (ref 92–99)
SODIUM SERPL-SCNC: 138 MMOL/L (ref 136–145)
WBC # BLD AUTO: 18.9 X10^3/UL (ref 4–11)

## 2020-07-01 RX ADMIN — PANTOPRAZOLE SODIUM SCH MG: 40 INJECTION, POWDER, FOR SOLUTION INTRAVENOUS at 09:25

## 2020-07-01 RX ADMIN — DEXTROSE SCH MLS/HR: 50 INJECTION, SOLUTION INTRAVENOUS at 09:26

## 2020-07-01 RX ADMIN — INSULIN LISPRO SCH UNITS: 100 INJECTION, SOLUTION INTRAVENOUS; SUBCUTANEOUS at 00:00

## 2020-07-01 RX ADMIN — MEROPENEM SCH MLS/HR: 500 INJECTION, POWDER, FOR SOLUTION INTRAVENOUS at 05:59

## 2020-07-01 RX ADMIN — MEROPENEM SCH MLS/HR: 500 INJECTION, POWDER, FOR SOLUTION INTRAVENOUS at 11:53

## 2020-07-01 RX ADMIN — INSULIN LISPRO SCH UNITS: 100 INJECTION, SOLUTION INTRAVENOUS; SUBCUTANEOUS at 11:58

## 2020-07-01 RX ADMIN — BACITRACIN SCH MLS/HR: 5000 INJECTION, POWDER, FOR SOLUTION INTRAMUSCULAR at 15:28

## 2020-07-01 RX ADMIN — IPRATROPIUM BROMIDE AND ALBUTEROL SULFATE SCH ML: .5; 3 SOLUTION RESPIRATORY (INHALATION) at 23:03

## 2020-07-01 RX ADMIN — DEXTROSE PRN MLS/HR: 50 INJECTION, SOLUTION INTRAVENOUS at 09:24

## 2020-07-01 RX ADMIN — MEROPENEM SCH MLS/HR: 500 INJECTION, POWDER, FOR SOLUTION INTRAVENOUS at 17:50

## 2020-07-01 RX ADMIN — DILTIAZEM HYDROCHLORIDE PRN MLS/HR: 5 INJECTION INTRAVENOUS at 16:09

## 2020-07-01 RX ADMIN — IPRATROPIUM BROMIDE AND ALBUTEROL SULFATE SCH ML: .5; 3 SOLUTION RESPIRATORY (INHALATION) at 00:43

## 2020-07-01 RX ADMIN — DILTIAZEM HYDROCHLORIDE PRN MLS/HR: 5 INJECTION INTRAVENOUS at 09:23

## 2020-07-01 RX ADMIN — MEROPENEM SCH MLS/HR: 500 INJECTION, POWDER, FOR SOLUTION INTRAVENOUS at 23:46

## 2020-07-01 RX ADMIN — DEXTROSE PRN MLS/HR: 50 INJECTION, SOLUTION INTRAVENOUS at 16:09

## 2020-07-01 RX ADMIN — Medication PRN EACH: at 14:50

## 2020-07-01 RX ADMIN — INSULIN LISPRO SCH UNITS: 100 INJECTION, SOLUTION INTRAVENOUS; SUBCUTANEOUS at 17:52

## 2020-07-01 RX ADMIN — IPRATROPIUM BROMIDE AND ALBUTEROL SULFATE SCH ML: .5; 3 SOLUTION RESPIRATORY (INHALATION) at 16:10

## 2020-07-01 RX ADMIN — IPRATROPIUM BROMIDE AND ALBUTEROL SULFATE SCH ML: .5; 3 SOLUTION RESPIRATORY (INHALATION) at 20:00

## 2020-07-01 RX ADMIN — IPRATROPIUM BROMIDE AND ALBUTEROL SULFATE SCH ML: .5; 3 SOLUTION RESPIRATORY (INHALATION) at 08:11

## 2020-07-01 RX ADMIN — INSULIN LISPRO SCH UNITS: 100 INJECTION, SOLUTION INTRAVENOUS; SUBCUTANEOUS at 06:10

## 2020-07-01 RX ADMIN — MEROPENEM SCH MLS/HR: 500 INJECTION, POWDER, FOR SOLUTION INTRAVENOUS at 02:28

## 2020-07-01 RX ADMIN — IPRATROPIUM BROMIDE AND ALBUTEROL SULFATE SCH ML: .5; 3 SOLUTION RESPIRATORY (INHALATION) at 11:49

## 2020-07-01 RX ADMIN — ACETYLCYSTEINE SCH MG: 200 INHALANT RESPIRATORY (INHALATION) at 08:00

## 2020-07-01 RX ADMIN — ENOXAPARIN SODIUM SCH MG: 40 INJECTION SUBCUTANEOUS at 08:00

## 2020-07-01 RX ADMIN — ACETYLCYSTEINE SCH MG: 200 INHALANT RESPIRATORY (INHALATION) at 20:00

## 2020-07-01 RX ADMIN — DAPTOMYCIN SCH MLS/HR: 500 INJECTION, POWDER, LYOPHILIZED, FOR SOLUTION INTRAVENOUS at 19:22

## 2020-07-01 RX ADMIN — IPRATROPIUM BROMIDE AND ALBUTEROL SULFATE SCH ML: .5; 3 SOLUTION RESPIRATORY (INHALATION) at 04:28

## 2020-07-01 NOTE — PDOC
SURGICAL PROGRESS NOTE


Subjective


Pt intubated and sedated, on min pressors


Vital Signs





Vital Signs








  Date Time  Temp Pulse Resp B/P (MAP) Pulse Ox O2 Delivery O2 Flow Rate FiO2


 


7/1/20 14:00  101 12 117/60 (79) 100 Ventilator  


 


7/1/20 12:00 99.6       





 99.6       


 


7/1/20 09:25       40.0 








I&O











Intake and Output 


 


 7/1/20





 07:00


 


Intake Total 5162.4 ml


 


Output Total 3010 ml


 


Balance 2152.4 ml


 


 


 


IV Total 3720.4 ml


 


Blood Product 580 ml


 


Blood Product IV Normal Saline Flush 862 ml


 


Output Urine Total 750 ml


 


Drainage Total 2260 ml








PATIENT HAS A VILLASENOR:  Yes (accurate i and os)


General:  No acute distress


Abdomen:  Soft, No tenderness, Other (drains in place)


Labs





Laboratory Tests








Test


 6/30/20


00:24 6/30/20


05:25 6/30/20


05:34 6/30/20


09:52


 


Glucose (Fingerstick)


 128 mg/dL


(70-99) 


 128 mg/dL


(70-99) 105 mg/dL


(70-99)


 


White Blood Count


 


 14.8 x10^3/uL


(4.0-11.0) 


 





 


Red Blood Count


 


 2.95 x10^6/uL


(3.50-5.40) 


 





 


Hemoglobin


 


 8.1 g/dL


(12.0-15.5) 


 





 


Hematocrit


 


 25.1 %


(36.0-47.0) 


 





 


Mean Corpuscular Volume  85 fL ()   


 


Mean Corpuscular Hemoglobin  27 pg (25-35)   


 


Mean Corpuscular Hemoglobin


Concent 


 32 g/dL


(31-37) 


 





 


Red Cell Distribution Width


 


 18.2 %


(11.5-14.5) 


 





 


Platelet Count


 


 452 x10^3/uL


(140-400) 


 





 


Neutrophils (%) (Auto)  80 % (31-73)   


 


Lymphocytes (%) (Auto)  10 % (24-48)   


 


Monocytes (%) (Auto)  8 % (0-9)   


 


Eosinophils (%) (Auto)  1 % (0-3)   


 


Basophils (%) (Auto)  0 % (0-3)   


 


Neutrophils # (Auto)


 


 11.9 x10^3/uL


(1.8-7.7) 


 





 


Lymphocytes # (Auto)


 


 1.4 x10^3/uL


(1.0-4.8) 


 





 


Monocytes # (Auto)


 


 1.2 x10^3/uL


(0.0-1.1) 


 





 


Eosinophils # (Auto)


 


 0.2 x10^3/uL


(0.0-0.7) 


 





 


Basophils # (Auto)


 


 0.0 x10^3/uL


(0.0-0.2) 


 





 


Sodium Level


 


 136 mmol/L


(136-145) 


 





 


Potassium Level


 


 4.4 mmol/L


(3.5-5.1) 


 





 


Chloride Level


 


 101 mmol/L


() 


 





 


Carbon Dioxide Level


 


 34 mmol/L


(21-32) 


 





 


Anion Gap  1 (6-14)   


 


Blood Urea Nitrogen


 


 14 mg/dL


(7-20) 


 





 


Creatinine


 


 0.5 mg/dL


(0.6-1.0) 


 





 


Estimated GFR


(Cockcroft-Gault) 


 131.1 


 


 





 


BUN/Creatinine Ratio  28 (6-20)   


 


Glucose Level


 


 136 mg/dL


(70-99) 


 





 


Calcium Level


 


 11.1 mg/dL


(8.5-10.1) 


 





 


Phosphorus Level


 


 3.9 mg/dL


(2.6-4.7) 


 





 


Magnesium Level


 


 2.1 mg/dL


(1.8-2.4) 


 





 


Total Bilirubin


 


 0.4 mg/dL


(0.2-1.0) 


 





 


Aspartate Amino Transf


(AST/SGOT) 


 18 U/L (15-37) 


 


 





 


Alanine Aminotransferase


(ALT/SGPT) 


 12 U/L (14-59) 


 


 





 


Alkaline Phosphatase


 


 118 U/L


() 


 





 


Total Protein


 


 4.7 g/dL


(6.4-8.2) 


 





 


Albumin


 


 1.1 g/dL


(3.4-5.0) 


 





 


Albumin/Globulin Ratio  0.3 (1.0-1.7)   


 


Triglycerides Level


 


 155 mg/dL


(0-150) 


 





 


Test


 6/30/20


15:30 6/30/20


16:10 6/30/20


21:30 7/1/20


06:00


 


White Blood Count


 49.3 x10^3/uL


(4.0-11.0) 


 21.5 x10^3/uL


(4.0-11.0) 18.9 x10^3/uL


(4.0-11.0)


 


Red Blood Count


 2.36 x10^6/uL


(3.50-5.40) 


 2.77 x10^6/uL


(3.50-5.40) 2.55 x10^6/uL


(3.50-5.40)


 


Hemoglobin


 6.7 g/dL


(12.0-15.5) 


 8.2 g/dL


(12.0-15.5) 7.5 g/dL


(12.0-15.5)


 


Hematocrit


 20.9 %


(36.0-47.0) 


 23.1 %


(36.0-47.0) 21.6 %


(36.0-47.0)


 


Mean Corpuscular Volume 89 fL ()   84 fL ()  85 fL () 


 


Mean Corpuscular Hemoglobin 28 pg (25-35)   30 pg (25-35)  29 pg (25-35) 


 


Mean Corpuscular Hemoglobin


Concent 32 g/dL


(31-37) 


 36 g/dL


(31-37) 35 g/dL


(31-37)


 


Red Cell Distribution Width


 16.7 %


(11.5-14.5) 


 14.8 %


(11.5-14.5) 14.8 %


(11.5-14.5)


 


Platelet Count


 335 x10^3/uL


(140-400) 


 211 x10^3/uL


(140-400) 253 x10^3/uL


(140-400)


 


Sodium Level


 138 mmol/L


(136-145) 


 


 138 mmol/L


(136-145)


 


Potassium Level


 4.3 mmol/L


(3.5-5.1) 


 


 3.9 mmol/L


(3.5-5.1)


 


Chloride Level


 107 mmol/L


() 


 


 104 mmol/L


()


 


Carbon Dioxide Level


 15 mmol/L


(21-32) 


 


 26 mmol/L


(21-32)


 


Anion Gap 16 (6-14)    8 (6-14) 


 


Blood Urea Nitrogen


 11 mg/dL


(7-20) 


 


 19 mg/dL


(7-20)


 


Creatinine


 0.7 mg/dL


(0.6-1.0) 


 


 0.8 mg/dL


(0.6-1.0)


 


Estimated GFR


(Cockcroft-Gault) 88.9 


 


 


 76.2 





 


Glucose Level


 169 mg/dL


(70-99) 


 


 264 mg/dL


(70-99)


 


Calcium Level


 7.5 mg/dL


(8.5-10.1) 


 


 9.2 mg/dL


(8.5-10.1)


 


Magnesium Level


 1.4 mg/dL


(1.8-2.4) 


 


 2.1 mg/dL


(1.8-2.4)


 


O2 Saturation  99 % (92-99)   


 


Arterial Blood pH


 


 7.25


(7.35-7.45) 


 





 


Arterial Blood pCO2 at


Patient Temp 


 32 mmHg


(35-46) 


 





 


Arterial Blood pO2 at Patient


Temp 


 374 mmHg


() 


 





 


Arterial Blood HCO3


 


 13 mmol/L


(21-28) 


 





 


Arterial Blood Base Excess


 


 -13 mmol/L


(-3-3) 


 





 


FiO2  80%+5   


 


Neutrophils (%) (Auto)    85 % (31-73) 


 


Lymphocytes (%) (Auto)    8 % (24-48) 


 


Monocytes (%) (Auto)    6 % (0-9) 


 


Eosinophils (%) (Auto)    0 % (0-3) 


 


Basophils (%) (Auto)    0 % (0-3) 


 


Neutrophils # (Auto)


 


 


 


 16.1 x10^3/uL


(1.8-7.7)


 


Lymphocytes # (Auto)


 


 


 


 1.5 x10^3/uL


(1.0-4.8)


 


Monocytes # (Auto)


 


 


 


 1.2 x10^3/uL


(0.0-1.1)


 


Eosinophils # (Auto)


 


 


 


 0.0 x10^3/uL


(0.0-0.7)


 


Basophils # (Auto)


 


 


 


 0.1 x10^3/uL


(0.0-0.2)


 


Phosphorus Level


 


 


 


 3.5 mg/dL


(2.6-4.7)


 


Test


 7/1/20


06:06 7/1/20


08:30 7/1/20


11:55 





 


Glucose (Fingerstick)


 249 mg/dL


(70-99) 


 235 mg/dL


(70-99) 





 


O2 Saturation  98 % (92-99)  97 % (92-99)  


 


Arterial Blood pH


 


 7.60


(7.35-7.45) 7.38


(7.35-7.45) 





 


Arterial Blood pCO2 at


Patient Temp 


 22 mmHg


(35-46) 38 mmHg


(35-46) 





 


Arterial Blood pO2 at Patient


Temp 


 120 mmHg


() 111 mmHg


() 





 


Arterial Blood HCO3


 


 21 mmol/L


(21-28) 22 mmol/L


(21-28) 





 


Arterial Blood Base Excess


 


 0 mmol/L


(-3-3) -3 mmol/L


(-3-3) 





 


FiO2  40  40  








Laboratory Tests








Test


 6/30/20


15:30 6/30/20


16:10 6/30/20


21:30 7/1/20


06:00


 


White Blood Count


 49.3 x10^3/uL


(4.0-11.0) 


 21.5 x10^3/uL


(4.0-11.0) 18.9 x10^3/uL


(4.0-11.0)


 


Red Blood Count


 2.36 x10^6/uL


(3.50-5.40) 


 2.77 x10^6/uL


(3.50-5.40) 2.55 x10^6/uL


(3.50-5.40)


 


Hemoglobin


 6.7 g/dL


(12.0-15.5) 


 8.2 g/dL


(12.0-15.5) 7.5 g/dL


(12.0-15.5)


 


Hematocrit


 20.9 %


(36.0-47.0) 


 23.1 %


(36.0-47.0) 21.6 %


(36.0-47.0)


 


Mean Corpuscular Volume 89 fL ()   84 fL ()  85 fL () 


 


Mean Corpuscular Hemoglobin 28 pg (25-35)   30 pg (25-35)  29 pg (25-35) 


 


Mean Corpuscular Hemoglobin


Concent 32 g/dL


(31-37) 


 36 g/dL


(31-37) 35 g/dL


(31-37)


 


Red Cell Distribution Width


 16.7 %


(11.5-14.5) 


 14.8 %


(11.5-14.5) 14.8 %


(11.5-14.5)


 


Platelet Count


 335 x10^3/uL


(140-400) 


 211 x10^3/uL


(140-400) 253 x10^3/uL


(140-400)


 


Sodium Level


 138 mmol/L


(136-145) 


 


 138 mmol/L


(136-145)


 


Potassium Level


 4.3 mmol/L


(3.5-5.1) 


 


 3.9 mmol/L


(3.5-5.1)


 


Chloride Level


 107 mmol/L


() 


 


 104 mmol/L


()


 


Carbon Dioxide Level


 15 mmol/L


(21-32) 


 


 26 mmol/L


(21-32)


 


Anion Gap 16 (6-14)    8 (6-14) 


 


Blood Urea Nitrogen


 11 mg/dL


(7-20) 


 


 19 mg/dL


(7-20)


 


Creatinine


 0.7 mg/dL


(0.6-1.0) 


 


 0.8 mg/dL


(0.6-1.0)


 


Estimated GFR


(Cockcroft-Gault) 88.9 


 


 


 76.2 





 


Glucose Level


 169 mg/dL


(70-99) 


 


 264 mg/dL


(70-99)


 


Calcium Level


 7.5 mg/dL


(8.5-10.1) 


 


 9.2 mg/dL


(8.5-10.1)


 


Magnesium Level


 1.4 mg/dL


(1.8-2.4) 


 


 2.1 mg/dL


(1.8-2.4)


 


O2 Saturation  99 % (92-99)   


 


Arterial Blood pH


 


 7.25


(7.35-7.45) 


 





 


Arterial Blood pCO2 at


Patient Temp 


 32 mmHg


(35-46) 


 





 


Arterial Blood pO2 at Patient


Temp 


 374 mmHg


() 


 





 


Arterial Blood HCO3


 


 13 mmol/L


(21-28) 


 





 


Arterial Blood Base Excess


 


 -13 mmol/L


(-3-3) 


 





 


FiO2  80%+5   


 


Neutrophils (%) (Auto)    85 % (31-73) 


 


Lymphocytes (%) (Auto)    8 % (24-48) 


 


Monocytes (%) (Auto)    6 % (0-9) 


 


Eosinophils (%) (Auto)    0 % (0-3) 


 


Basophils (%) (Auto)    0 % (0-3) 


 


Neutrophils # (Auto)


 


 


 


 16.1 x10^3/uL


(1.8-7.7)


 


Lymphocytes # (Auto)


 


 


 


 1.5 x10^3/uL


(1.0-4.8)


 


Monocytes # (Auto)


 


 


 


 1.2 x10^3/uL


(0.0-1.1)


 


Eosinophils # (Auto)


 


 


 


 0.0 x10^3/uL


(0.0-0.7)


 


Basophils # (Auto)


 


 


 


 0.1 x10^3/uL


(0.0-0.2)


 


Phosphorus Level


 


 


 


 3.5 mg/dL


(2.6-4.7)


 


Test


 7/1/20


06:06 7/1/20


08:30 7/1/20


11:55 





 


Glucose (Fingerstick)


 249 mg/dL


(70-99) 


 235 mg/dL


(70-99) 





 


O2 Saturation  98 % (92-99)  97 % (92-99)  


 


Arterial Blood pH


 


 7.60


(7.35-7.45) 7.38


(7.35-7.45) 





 


Arterial Blood pCO2 at


Patient Temp 


 22 mmHg


(35-46) 38 mmHg


(35-46) 





 


Arterial Blood pO2 at Patient


Temp 


 120 mmHg


() 111 mmHg


() 





 


Arterial Blood HCO3


 


 21 mmol/L


(21-28) 22 mmol/L


(21-28) 





 


Arterial Blood Base Excess


 


 0 mmol/L


(-3-3) -3 mmol/L


(-3-3) 





 


FiO2  40  40  








Problem List


Problems


Medical Problems:


(1) Acute pancreatitis


Status: Acute  





(2) Cholelithiasis


Status: Acute  








Assessment/Plan


s/p xlap-appears improved, cont drains, will start trophic tube feeds.





Justicifation of Admission Dx:


Justifications for Admission:


Justification of Admission Dx:  Yes











GAMAL ZHOU MD              Jul 1, 2020 14:33

## 2020-07-01 NOTE — PDOC
PULMONARY PROGRESS NOTES


Subjective


Laying in bed, in no respiratory distress





Patient intubated on 3/23 , s/p trach 4/6, 


remains on TS, 


episode of AMS and tachycardia yesterday


Vitals





Vital Signs








  Date Time  Temp Pulse Resp B/P (MAP) Pulse Ox O2 Delivery O2 Flow Rate FiO2


 


7/1/20 08:12     100 Ventilator  


 


7/1/20 07:00  104 20 112/66 (81)    


 


7/1/20 04:00 99.8       





 99.8       


 


7/1/20 02:59       8.0 








ROS:  No Nausea, No Chest Pain, No Abdominal Pain


General:  Alert, No acute distress


HEENT:  Other (nc at perrl   nose clear    neck  trach site ok   no lab  no 

thyromegaly)


Lungs:  Other (diminshed in bases)


Cardiovascular:  S1, S2


Abdomen:  Soft, Non-tender, Other (multiple ROBERT drains )


Extremities:  Other (+1 BLE edema)


Skin:  Warm, Dry


Labs





Laboratory Tests








Test


 6/29/20


11:51 6/30/20


00:24 6/30/20


05:25 6/30/20


05:34


 


Glucose (Fingerstick)


 125 mg/dL


(70-99) 128 mg/dL


(70-99) 


 128 mg/dL


(70-99)


 


White Blood Count


 


 


 14.8 x10^3/uL


(4.0-11.0) 





 


Red Blood Count


 


 


 2.95 x10^6/uL


(3.50-5.40) 





 


Hemoglobin


 


 


 8.1 g/dL


(12.0-15.5) 





 


Hematocrit


 


 


 25.1 %


(36.0-47.0) 





 


Mean Corpuscular Volume   85 fL ()  


 


Mean Corpuscular Hemoglobin   27 pg (25-35)  


 


Mean Corpuscular Hemoglobin


Concent 


 


 32 g/dL


(31-37) 





 


Red Cell Distribution Width


 


 


 18.2 %


(11.5-14.5) 





 


Platelet Count


 


 


 452 x10^3/uL


(140-400) 





 


Neutrophils (%) (Auto)   80 % (31-73)  


 


Lymphocytes (%) (Auto)   10 % (24-48)  


 


Monocytes (%) (Auto)   8 % (0-9)  


 


Eosinophils (%) (Auto)   1 % (0-3)  


 


Basophils (%) (Auto)   0 % (0-3)  


 


Neutrophils # (Auto)


 


 


 11.9 x10^3/uL


(1.8-7.7) 





 


Lymphocytes # (Auto)


 


 


 1.4 x10^3/uL


(1.0-4.8) 





 


Monocytes # (Auto)


 


 


 1.2 x10^3/uL


(0.0-1.1) 





 


Eosinophils # (Auto)


 


 


 0.2 x10^3/uL


(0.0-0.7) 





 


Basophils # (Auto)


 


 


 0.0 x10^3/uL


(0.0-0.2) 





 


Sodium Level


 


 


 136 mmol/L


(136-145) 





 


Potassium Level


 


 


 4.4 mmol/L


(3.5-5.1) 





 


Chloride Level


 


 


 101 mmol/L


() 





 


Carbon Dioxide Level


 


 


 34 mmol/L


(21-32) 





 


Anion Gap   1 (6-14)  


 


Blood Urea Nitrogen


 


 


 14 mg/dL


(7-20) 





 


Creatinine


 


 


 0.5 mg/dL


(0.6-1.0) 





 


Estimated GFR


(Cockcroft-Gault) 


 


 131.1 


 





 


BUN/Creatinine Ratio   28 (6-20)  


 


Glucose Level


 


 


 136 mg/dL


(70-99) 





 


Calcium Level


 


 


 11.1 mg/dL


(8.5-10.1) 





 


Phosphorus Level


 


 


 3.9 mg/dL


(2.6-4.7) 





 


Magnesium Level


 


 


 2.1 mg/dL


(1.8-2.4) 





 


Total Bilirubin


 


 


 0.4 mg/dL


(0.2-1.0) 





 


Aspartate Amino Transf


(AST/SGOT) 


 


 18 U/L (15-37) 


 





 


Alanine Aminotransferase


(ALT/SGPT) 


 


 12 U/L (14-59) 


 





 


Alkaline Phosphatase


 


 


 118 U/L


() 





 


Total Protein


 


 


 4.7 g/dL


(6.4-8.2) 





 


Albumin


 


 


 1.1 g/dL


(3.4-5.0) 





 


Albumin/Globulin Ratio   0.3 (1.0-1.7)  


 


Triglycerides Level


 


 


 155 mg/dL


(0-150) 





 


Test


 6/30/20


09:52 6/30/20


15:30 6/30/20


16:10 6/30/20


21:30


 


Glucose (Fingerstick)


 105 mg/dL


(70-99) 


 


 





 


White Blood Count


 


 49.3 x10^3/uL


(4.0-11.0) 


 21.5 x10^3/uL


(4.0-11.0)


 


Red Blood Count


 


 2.36 x10^6/uL


(3.50-5.40) 


 2.77 x10^6/uL


(3.50-5.40)


 


Hemoglobin


 


 6.7 g/dL


(12.0-15.5) 


 8.2 g/dL


(12.0-15.5)


 


Hematocrit


 


 20.9 %


(36.0-47.0) 


 23.1 %


(36.0-47.0)


 


Mean Corpuscular Volume  89 fL ()   84 fL () 


 


Mean Corpuscular Hemoglobin  28 pg (25-35)   30 pg (25-35) 


 


Mean Corpuscular Hemoglobin


Concent 


 32 g/dL


(31-37) 


 36 g/dL


(31-37)


 


Red Cell Distribution Width


 


 16.7 %


(11.5-14.5) 


 14.8 %


(11.5-14.5)


 


Platelet Count


 


 335 x10^3/uL


(140-400) 


 211 x10^3/uL


(140-400)


 


Sodium Level


 


 138 mmol/L


(136-145) 


 





 


Potassium Level


 


 4.3 mmol/L


(3.5-5.1) 


 





 


Chloride Level


 


 107 mmol/L


() 


 





 


Carbon Dioxide Level


 


 15 mmol/L


(21-32) 


 





 


Anion Gap  16 (6-14)   


 


Blood Urea Nitrogen


 


 11 mg/dL


(7-20) 


 





 


Creatinine


 


 0.7 mg/dL


(0.6-1.0) 


 





 


Estimated GFR


(Cockcroft-Gault) 


 88.9 


 


 





 


Glucose Level


 


 169 mg/dL


(70-99) 


 





 


Calcium Level


 


 7.5 mg/dL


(8.5-10.1) 


 





 


Magnesium Level


 


 1.4 mg/dL


(1.8-2.4) 


 





 


O2 Saturation   99 % (92-99)  


 


Arterial Blood pH


 


 


 7.25


(7.35-7.45) 





 


Arterial Blood pCO2 at


Patient Temp 


 


 32 mmHg


(35-46) 





 


Arterial Blood pO2 at Patient


Temp 


 


 374 mmHg


() 





 


Arterial Blood HCO3


 


 


 13 mmol/L


(21-28) 





 


Arterial Blood Base Excess


 


 


 -13 mmol/L


(-3-3) 





 


FiO2   80%+5  


 


Test


 7/1/20


06:00 7/1/20


06:06 


 





 


White Blood Count


 18.9 x10^3/uL


(4.0-11.0) 


 


 





 


Red Blood Count


 2.55 x10^6/uL


(3.50-5.40) 


 


 





 


Hemoglobin


 7.5 g/dL


(12.0-15.5) 


 


 





 


Hematocrit


 21.6 %


(36.0-47.0) 


 


 





 


Mean Corpuscular Volume 85 fL ()    


 


Mean Corpuscular Hemoglobin 29 pg (25-35)    


 


Mean Corpuscular Hemoglobin


Concent 35 g/dL


(31-37) 


 


 





 


Red Cell Distribution Width


 14.8 %


(11.5-14.5) 


 


 





 


Platelet Count


 253 x10^3/uL


(140-400) 


 


 





 


Neutrophils (%) (Auto) 85 % (31-73)    


 


Lymphocytes (%) (Auto) 8 % (24-48)    


 


Monocytes (%) (Auto) 6 % (0-9)    


 


Eosinophils (%) (Auto) 0 % (0-3)    


 


Basophils (%) (Auto) 0 % (0-3)    


 


Neutrophils # (Auto)


 16.1 x10^3/uL


(1.8-7.7) 


 


 





 


Lymphocytes # (Auto)


 1.5 x10^3/uL


(1.0-4.8) 


 


 





 


Monocytes # (Auto)


 1.2 x10^3/uL


(0.0-1.1) 


 


 





 


Eosinophils # (Auto)


 0.0 x10^3/uL


(0.0-0.7) 


 


 





 


Basophils # (Auto)


 0.1 x10^3/uL


(0.0-0.2) 


 


 





 


Sodium Level


 138 mmol/L


(136-145) 


 


 





 


Potassium Level


 3.9 mmol/L


(3.5-5.1) 


 


 





 


Chloride Level


 104 mmol/L


() 


 


 





 


Carbon Dioxide Level


 26 mmol/L


(21-32) 


 


 





 


Anion Gap 8 (6-14)    


 


Blood Urea Nitrogen


 19 mg/dL


(7-20) 


 


 





 


Creatinine


 0.8 mg/dL


(0.6-1.0) 


 


 





 


Estimated GFR


(Cockcroft-Gault) 76.2 


 


 


 





 


Glucose Level


 264 mg/dL


(70-99) 


 


 





 


Calcium Level


 9.2 mg/dL


(8.5-10.1) 


 


 





 


Phosphorus Level


 3.5 mg/dL


(2.6-4.7) 


 


 





 


Magnesium Level


 2.1 mg/dL


(1.8-2.4) 


 


 





 


Glucose (Fingerstick)


 


 249 mg/dL


(70-99) 


 











Laboratory Tests








Test


 6/30/20


09:52 6/30/20


15:30 6/30/20


16:10 6/30/20


21:30


 


Glucose (Fingerstick)


 105 mg/dL


(70-99) 


 


 





 


White Blood Count


 


 49.3 x10^3/uL


(4.0-11.0) 


 21.5 x10^3/uL


(4.0-11.0)


 


Red Blood Count


 


 2.36 x10^6/uL


(3.50-5.40) 


 2.77 x10^6/uL


(3.50-5.40)


 


Hemoglobin


 


 6.7 g/dL


(12.0-15.5) 


 8.2 g/dL


(12.0-15.5)


 


Hematocrit


 


 20.9 %


(36.0-47.0) 


 23.1 %


(36.0-47.0)


 


Mean Corpuscular Volume  89 fL ()   84 fL () 


 


Mean Corpuscular Hemoglobin  28 pg (25-35)   30 pg (25-35) 


 


Mean Corpuscular Hemoglobin


Concent 


 32 g/dL


(31-37) 


 36 g/dL


(31-37)


 


Red Cell Distribution Width


 


 16.7 %


(11.5-14.5) 


 14.8 %


(11.5-14.5)


 


Platelet Count


 


 335 x10^3/uL


(140-400) 


 211 x10^3/uL


(140-400)


 


Sodium Level


 


 138 mmol/L


(136-145) 


 





 


Potassium Level


 


 4.3 mmol/L


(3.5-5.1) 


 





 


Chloride Level


 


 107 mmol/L


() 


 





 


Carbon Dioxide Level


 


 15 mmol/L


(21-32) 


 





 


Anion Gap  16 (6-14)   


 


Blood Urea Nitrogen


 


 11 mg/dL


(7-20) 


 





 


Creatinine


 


 0.7 mg/dL


(0.6-1.0) 


 





 


Estimated GFR


(Cockcroft-Gault) 


 88.9 


 


 





 


Glucose Level


 


 169 mg/dL


(70-99) 


 





 


Calcium Level


 


 7.5 mg/dL


(8.5-10.1) 


 





 


Magnesium Level


 


 1.4 mg/dL


(1.8-2.4) 


 





 


O2 Saturation   99 % (92-99)  


 


Arterial Blood pH


 


 


 7.25


(7.35-7.45) 





 


Arterial Blood pCO2 at


Patient Temp 


 


 32 mmHg


(35-46) 





 


Arterial Blood pO2 at Patient


Temp 


 


 374 mmHg


() 





 


Arterial Blood HCO3


 


 


 13 mmol/L


(21-28) 





 


Arterial Blood Base Excess


 


 


 -13 mmol/L


(-3-3) 





 


FiO2   80%+5  


 


Test


 7/1/20


06:00 7/1/20


06:06 


 





 


White Blood Count


 18.9 x10^3/uL


(4.0-11.0) 


 


 





 


Red Blood Count


 2.55 x10^6/uL


(3.50-5.40) 


 


 





 


Hemoglobin


 7.5 g/dL


(12.0-15.5) 


 


 





 


Hematocrit


 21.6 %


(36.0-47.0) 


 


 





 


Mean Corpuscular Volume 85 fL ()    


 


Mean Corpuscular Hemoglobin 29 pg (25-35)    


 


Mean Corpuscular Hemoglobin


Concent 35 g/dL


(31-37) 


 


 





 


Red Cell Distribution Width


 14.8 %


(11.5-14.5) 


 


 





 


Platelet Count


 253 x10^3/uL


(140-400) 


 


 





 


Neutrophils (%) (Auto) 85 % (31-73)    


 


Lymphocytes (%) (Auto) 8 % (24-48)    


 


Monocytes (%) (Auto) 6 % (0-9)    


 


Eosinophils (%) (Auto) 0 % (0-3)    


 


Basophils (%) (Auto) 0 % (0-3)    


 


Neutrophils # (Auto)


 16.1 x10^3/uL


(1.8-7.7) 


 


 





 


Lymphocytes # (Auto)


 1.5 x10^3/uL


(1.0-4.8) 


 


 





 


Monocytes # (Auto)


 1.2 x10^3/uL


(0.0-1.1) 


 


 





 


Eosinophils # (Auto)


 0.0 x10^3/uL


(0.0-0.7) 


 


 





 


Basophils # (Auto)


 0.1 x10^3/uL


(0.0-0.2) 


 


 





 


Sodium Level


 138 mmol/L


(136-145) 


 


 





 


Potassium Level


 3.9 mmol/L


(3.5-5.1) 


 


 





 


Chloride Level


 104 mmol/L


() 


 


 





 


Carbon Dioxide Level


 26 mmol/L


(21-32) 


 


 





 


Anion Gap 8 (6-14)    


 


Blood Urea Nitrogen


 19 mg/dL


(7-20) 


 


 





 


Creatinine


 0.8 mg/dL


(0.6-1.0) 


 


 





 


Estimated GFR


(Cockcroft-Gault) 76.2 


 


 


 





 


Glucose Level


 264 mg/dL


(70-99) 


 


 





 


Calcium Level


 9.2 mg/dL


(8.5-10.1) 


 


 





 


Phosphorus Level


 3.5 mg/dL


(2.6-4.7) 


 


 





 


Magnesium Level


 2.1 mg/dL


(1.8-2.4) 


 


 





 


Glucose (Fingerstick)


 


 249 mg/dL


(70-99) 


 











Medications





Active Scripts








 Medications  Dose


 Route/Sig


 Max Daily Dose Days Date Category


 


 Bisoprolol


 Fumarate 5 Mg


 Tablet  10 Mg


 PO DAILY


   3/16/20 Reported








Comments


 CXR 6/28/20


IMPRESSION:


No significant interval change compared to 6/25/2020.


 











ct abdomen /pelvis 6/6


1. Removal of the percutaneous pigtail drainage catheters since the prior 


exam. Sequela of pancreatitis with extensive pseudocysts again 


demonstrated, the right-sided collections are slightly larger since the 


prior exam, the left-sided collections are stable. See above.


2. Moderate to large left pleural effusion with atelectasis and collapse 


of most of the left lower lobe, stable. Small right pleural effusion is 


stable.


3. Gallstone.





ct chest 6/15 reviewed








 GRAM NEG COCCOBACILLI:MANY


        SQUAMOUS EPI CELL:RARE


        PMN (WBCs):[id number]


        Unless otherwise specified, Testing Performed by:


        [address] [address]


        For Inquires, the Physician may contact the Microbiology


        department at [phone]





  RESPIRATORY CULTURE  Final  


        Final





        MANY GRAM NEGATIVE RODS on 06/15/20 at 1103


        FINAL ID= [PSEUDOMONAS AERUGINOSA]


        MICRO CHARGES


        PSEUDOMONAS AERUGINOSA





  ANTIMICROBIAL SUSCEPTIBILITY  Final  


        Comment





        NEG ANSON 56


        PSEUDOMONAS AERUGINOSA


        ANTIBIOTIC                        RESULT          INTERPRETATION


        AMIKACIN                          <=16                  S


        AZTREONAM                         <=4                   S


        CEFTAZIDIME                       <=1                   S


        CIPROFLOXACIN                     <=0.25                S


        CEFEPIME                          <=2                   S


        CEFTAZIDIME/AVIBACTAM             <=4                   S


        GENTAMICIN                        <=2                   S


        LEVOFLOXACIN                      <=0.5                 S





Impression


.





IMPRESSION:


1.  Acute hypoxemic respiratory failure secondary to ARDS status post trach, 

developed anemia 6/7, blood drainage from RLQ abdomen drain site, and 

surrounding firmness  / developed septic shock 6/7 from abdomen source, required

levo 6/7


s/p 3 new drains 6/7 with brown color drainage,  on/off vent , on TS now


2.  Gallstone pancreatitis, now with ongoing bleeding from prior drain. Anemic. 

s/p Tx multiple units over several days


3.  septic shock/sepsis, recurrent 6/7, source abdomen. , off levo now, 


4.  Acute kidney injury-, Off HD--renal function decling. suspect JED on CKD due

to hypotension , improved now


5.  Acute gallstone pancreatitis.


6.  Hypoalbuminemia.


7.  Moderate persistent effusions, s/p left thora  5/12, reaccumulation of left 

effusion. O2 requirement not changed. 


8.  Fever- ,hypotension. suspect recurrent sepsis/ likely pancreatic source.  

Per ID, per surgery--


9.  Chronic anemia-- ongoing / s/p PRBC


10. Covid 19 testing negative


11. Moderate to large ascites-S/P paracentisis


12.S/P paracentisis with 4 liters removed on 4/15/20


13. S/P IR drain placement on 5/8/2020, removal, re inserted 6/7


14. Depression/Anxiety 


15. Increase effusion, ? loculated/ s/p chest tube.. drainage slowing down. 200 

cc /24 hr





6/30


Exploratory laparotomy, lysis of adhesions, subtotal cholecystectomy with 

cholangiogram, gastrojejunostomy tube placement, pancreatic necrosectomy





Plan


.


Multiple drains in place OR note





Continue assist control ventilation





Antibiotics per ID





Pressors for hypotension





IV fluids





Follow labs.





CC time 30 minutes











STEVE MIRANDA MD               Jul 1, 2020 08:33

## 2020-07-01 NOTE — PDOC
PROGRESS NOTES


Chief Complaint


Chief Complaint


 


50yo F w/ PMHx HTN, prediabetes who presented the emergency room complaints of 

abdominal pain. Patient described off and on 3 days. She states is constant, 

described as a squeezing sensation in a band-like distribution. + nausea, 

vomiting.  She denies any fever or diarrhea.  Patient denies any abdominal 

surgical procedures.  She stateed is worse with movements, car ride.  Pain 

initially was upper abdomen however now pretty much generalized.  Last bowel 

movement was 3/15/2020. Nothing makes her pain better.  Patient denies any 

shortness of breath.  She does state the pain moves into her chest.  Denies any 

headache or visual changes.


Lipase 12231, , , Bilirubin 1.4.


CT abdomen confirms pancreatic inflammation, peripancreatic fluid and 

inflammatory changes around the pancreas consistent with pancreatitis. 

Cholelithiasis and 1.4cm uterine fibroid as well as possible left salpingitis. 

Admitted for further care


Gallstone pancreatitis with necrosis. 


   -CT A/P 6/6 showed multiple pseudocysts, slight larger on the right. s/p 

drains x 3, 6/7.  + PSAE (MDRO-R Cefepime, Zosyn ANSON < 64) and yeast, 


   -s/p drain 4/27. C. parapsilosis. s/p drain 5/6 + yeast & high amylase; s/p 

additional drain on 5/8. Drains removed. 


Ascites s/p paracentesis 4/15 & 5/6. C. parapsilosis 


JED. off HD. 





Impression and plan:


Acute hypoxic Respiratory failure required  mechanical ventilation


Tracheostomy


bilateral pleural effusions/pulm edema s/p Throacentesis on 6/15/2020


Severe Acute gallstone pancreatitis (not a surgical candidate at this time) with

necrosis


Acute kidney failure now requiring dialysis


Gallstones (Calculus of gallbladder with acute cholecystitis without 

obstruction)


HTN 


Intractable pain


Intractable nausea


Covid 19 negative. 


Acute on chronic anemia 


EEG: No seizure activityFever  - better currently - intermittent could be from 

underlying pancreatitis blood cults 5/4 - neg so far


? Ileus with vomiting


Abd distention - U/S and CT reviewed s/p 0.4 L of opaque, debris-containing 

ascites was removed 5/6


Acute pancreatitis with persistent necrosis


Gallstone pancreatitis with necrosis. 


   -CT A/P 6/6 showed multiple pseudocysts, slight larger on the right. s/p 

drains x 3, 6/7.  + PSAE (MDRO-R Cefepime, Zosyn ANSON < 64) and yeast, 


   -s/p drain 4/27. C. parapsilosis. s/p drain 5/6 + yeast & high amylase; s/p 

additional drain on 5/8. Drains removed. 


Ascites s/p paracentesis 4/15 & 5/6. C. parapsilosis 


JED. off HD. 


A large fluid collection in the pancreatic bed has slightly decreased in size, 

described below, the pancreas itself is difficult to  visualize, which could be 

due to necrosis or obscuration of pancreatic  parenchyma from the surrounding 

fluid collection.6/15 


- 4/27 status post ROBERT drain placement + C paropsilosis. s/p additional drains 

5/8


Anemia - S/p PRBCs


Cholelithiasis with thickening of the gallbladder wall.


Leucocytosis improving


JED, hyperkalemia, Metabolic acidosis off dialysis


hypocalcemia 


Prediabetes


HTN


s/p trach


ESRD on HD


Hyperglycemia


severe protein-caloric malnutrition


Moderate to large left pleural effusion with atelectasis and collapse  of most 

of the left lower lobe, stable


 


Dispo - ICU, critically ill


Poor prognosis





History of Present Illness


History of Present Illness


 7/1,  s/p surg 


BP better,   labile, 


cont IV meds,  IV nutrition


s/p large surgery, pancreas removal, 








6/30 /2020


fever overnight, blood cult,


cont dapto and merrem ( 6/28)


add micafungin


Patient seen and examined ICU BED


plan OR 6/30  for necrosectomy, cholecystectomy and feeding tube placement.


guarded-long-term prognosis 


Sputum from 6/13 - growing PSA - may be colonization I to Meropenem 


Continue meropenem, has MDRP PSAE June 7 from abd


bleeding from Sx. site RLQ and firmness)stable


oncology consulted   


Cholelithiasis. Mild thickened appearance of the gallbladder wall. sono 6/25  

Mild right hydronephrosis.Fluid collection identified anterior to the pancreas. 

   


  


  





6/26/2020


Patient having trouble with secretions this morning, no other complaints, 

discussed with surgical ARNP. Plans for OR on Tuesday. No fever above 99.9 

overnight, remains tachycardic, medications reviewed.





6/27/2020


Patient with thick secretions, no other acute events reported overnight.  

Patient seems to be quite anxious, I have tried to provide reassurance during my

encounter regarding her upcoming surgical procedure.  Patient seems to be quite 

anxious and seems to experience panic attacks when she first wakes up thinking 

that is the day of surgery.  At the present time she seems to be medically 

optimized for planned surgery, discussed with nursing staff at bedside





6/29/2020





Patient seems to be hallucinating and having probably memories from her former 

life prior to this prolonged hospital stay which is 3 months plus in length.  

Immediate plans are for surgical intervention on Tuesday which will consist of a

Whipple procedure.  At the present time the patient remains critically stable 

her prognosis is quite guarded at best.  fever last night  hallucinations co

ntinues to have chest tube in multiple abdominal drains as well.  All these are 

probable source of infection 





plan OR 6/30  for necrosectomy, cholecystectomy and feeding tube placement.








37 min cc time


  


      




















Date:  Apr 27, 2020





Pre-Op Diagnosis:


Necrotizing pancreatitis





Post-Op Diagnosis:


same





Procedure Performed:


laparoscopic exploration





Surgeon:


Frandy Flores


Asst:  Dr. Luis Santos





Anesthesia Type:


GETA plus local





Blood Loss:


50





Specimans Obtained:


cultures, debris





Findings:


1000 cc ascites suctioned off, cultures sent, diffuse debris, obliteration of 

surgical planes preventing any meaningful exploration




















 


 





 








6/1/2020


Patient seen and examined in the ICU


She appears extremely ill


She is tachypneic at 35 respirations per minute and tachycardic at 132 bpm


She is extremely encephalopathic and shaky


She appears clammy


Chart reviewed


Discussed with RN


Prognosis extremely guarded at best








5/31/2020


Patient still in ICU


Resting with no apparent distress


Chart reviewed








5/30/2020


Patient seen and examined in the ICU


She is wiping her face with a cough


Discussed with RN


Chart reviewed


We hope to get her out of the ICU later today if possible








5/29/2020


Patient seen and examined in the ICU once again


She is back on NG suction


On IV Zosyn


Has IV TPN


Sedated with Precedex but anxious still


Appears somewhat clammy and pale


Chart reviewed


Discussed with RN


She remains critically ill











 


 


BRIEF OPERATIVE NOTE


Pre-Op Diagnosis


Pancreatitis with pseudocysts, suspected infection


Post-Op Diagnosis


same


Procedure Performed


CT abdominal Drains x 3


Surgeon


Hardy


Anesthesia Type:  Conscious Sedation


Findings


3 abdominal drains, 14F, with turbid pancreatic fluid and necrotic debris in 

each.


Complications


No immediate








5/9: Patient today somewhat restless and having bilious secretions from ET tube,

imaging studies ordered, discussed with consultant. Pretty poor prognosis, 

hopefully is not a fistula, poor surgical candidate. 





5/10: Imaging with no acute events, she seems more stable today compared to 

yesterday. Encouraged as much activity as possible patient at high risk for 

severe depression.





Vitals


Vitals





Vital Signs








  Date Time  Temp Pulse Resp B/P (MAP) Pulse Ox O2 Delivery O2 Flow Rate FiO2


 


7/1/20 13:00  97 12 97/57 (70) 100 Ventilator  


 


7/1/20 12:00 99.6       





 99.6       


 


7/1/20 09:25       40.0 











Physical Exam


Physical Exam


GENERAL: intubated/sedated


HEENT: Anicteric, no thrush, NG tube in place


NECK:  Tracheostomy +


LUNGS: Diminished aeration bases,  CT on left 


HEART:  S1, S2,regular 


ABDOMEN: Distention, bowel sounds hypoactive soft, grimaces to 

palpation,gildardo x2 


3 ROBERT drains, GT +,


: [place] ( 6/7)


EXTREMITIES: + edema BLE


SKIN: no signs of gen rash 


LUE-PICC  without signs of complications


General:  Alert, Oriented X3, Cooperative, No acute distress


Heart:  Regular rate (SR/ST), Other (distant heart sounds)


Lungs:  Other (diminshed in bases)


Abdomen:  Soft, No tenderness, Other (drains with pancreatic necrosis)


Extremities:  No cyanosis, Other (3+ bilateral LE pitting edema)


Skin:  No rashes, No significant lesion





Labs


LABS





Laboratory Tests








Test


 6/30/20


15:30 6/30/20


16:10 6/30/20


21:30 7/1/20


06:00


 


White Blood Count


 49.3 x10^3/uL


(4.0-11.0) 


 21.5 x10^3/uL


(4.0-11.0) 18.9 x10^3/uL


(4.0-11.0)


 


Red Blood Count


 2.36 x10^6/uL


(3.50-5.40) 


 2.77 x10^6/uL


(3.50-5.40) 2.55 x10^6/uL


(3.50-5.40)


 


Hemoglobin


 6.7 g/dL


(12.0-15.5) 


 8.2 g/dL


(12.0-15.5) 7.5 g/dL


(12.0-15.5)


 


Hematocrit


 20.9 %


(36.0-47.0) 


 23.1 %


(36.0-47.0) 21.6 %


(36.0-47.0)


 


Mean Corpuscular Volume 89 fL ()   84 fL ()  85 fL () 


 


Mean Corpuscular Hemoglobin 28 pg (25-35)   30 pg (25-35)  29 pg (25-35) 


 


Mean Corpuscular Hemoglobin


Concent 32 g/dL


(31-37) 


 36 g/dL


(31-37) 35 g/dL


(31-37)


 


Red Cell Distribution Width


 16.7 %


(11.5-14.5) 


 14.8 %


(11.5-14.5) 14.8 %


(11.5-14.5)


 


Platelet Count


 335 x10^3/uL


(140-400) 


 211 x10^3/uL


(140-400) 253 x10^3/uL


(140-400)


 


Sodium Level


 138 mmol/L


(136-145) 


 


 138 mmol/L


(136-145)


 


Potassium Level


 4.3 mmol/L


(3.5-5.1) 


 


 3.9 mmol/L


(3.5-5.1)


 


Chloride Level


 107 mmol/L


() 


 


 104 mmol/L


()


 


Carbon Dioxide Level


 15 mmol/L


(21-32) 


 


 26 mmol/L


(21-32)


 


Anion Gap 16 (6-14)    8 (6-14) 


 


Blood Urea Nitrogen


 11 mg/dL


(7-20) 


 


 19 mg/dL


(7-20)


 


Creatinine


 0.7 mg/dL


(0.6-1.0) 


 


 0.8 mg/dL


(0.6-1.0)


 


Estimated GFR


(Cockcroft-Gault) 88.9 


 


 


 76.2 





 


Glucose Level


 169 mg/dL


(70-99) 


 


 264 mg/dL


(70-99)


 


Calcium Level


 7.5 mg/dL


(8.5-10.1) 


 


 9.2 mg/dL


(8.5-10.1)


 


Magnesium Level


 1.4 mg/dL


(1.8-2.4) 


 


 2.1 mg/dL


(1.8-2.4)


 


O2 Saturation  99 % (92-99)   


 


Arterial Blood pH


 


 7.25


(7.35-7.45) 


 





 


Arterial Blood pCO2 at


Patient Temp 


 32 mmHg


(35-46) 


 





 


Arterial Blood pO2 at Patient


Temp 


 374 mmHg


() 


 





 


Arterial Blood HCO3


 


 13 mmol/L


(21-28) 


 





 


Arterial Blood Base Excess


 


 -13 mmol/L


(-3-3) 


 





 


FiO2  80%+5   


 


Neutrophils (%) (Auto)    85 % (31-73) 


 


Lymphocytes (%) (Auto)    8 % (24-48) 


 


Monocytes (%) (Auto)    6 % (0-9) 


 


Eosinophils (%) (Auto)    0 % (0-3) 


 


Basophils (%) (Auto)    0 % (0-3) 


 


Neutrophils # (Auto)


 


 


 


 16.1 x10^3/uL


(1.8-7.7)


 


Lymphocytes # (Auto)


 


 


 


 1.5 x10^3/uL


(1.0-4.8)


 


Monocytes # (Auto)


 


 


 


 1.2 x10^3/uL


(0.0-1.1)


 


Eosinophils # (Auto)


 


 


 


 0.0 x10^3/uL


(0.0-0.7)


 


Basophils # (Auto)


 


 


 


 0.1 x10^3/uL


(0.0-0.2)


 


Phosphorus Level


 


 


 


 3.5 mg/dL


(2.6-4.7)


 


Test


 7/1/20


06:06 7/1/20


08:30 7/1/20


11:55 





 


Glucose (Fingerstick)


 249 mg/dL


(70-99) 


 235 mg/dL


(70-99) 





 


O2 Saturation  98 % (92-99)  97 % (92-99)  


 


Arterial Blood pH


 


 7.60


(7.35-7.45) 7.38


(7.35-7.45) 





 


Arterial Blood pCO2 at


Patient Temp 


 22 mmHg


(35-46) 38 mmHg


(35-46) 





 


Arterial Blood pO2 at Patient


Temp 


 120 mmHg


() 111 mmHg


() 





 


Arterial Blood HCO3


 


 21 mmol/L


(21-28) 22 mmol/L


(21-28) 





 


Arterial Blood Base Excess


 


 0 mmol/L


(-3-3) -3 mmol/L


(-3-3) 





 


FiO2  40  40  











Assessment and Plan


Assessmemt and Plan


Problems


Medical Problems:


(1) Acute pancreatitis


Status: Acute  





(2) Cholelithiasis


Status: Acute  











Comment


Review of Relevant


I have reviewed the following items josy (where applicable) has been applied.


Labs





Laboratory Tests








Test


 6/30/20


00:24 6/30/20


05:25 6/30/20


05:34 6/30/20


09:52


 


Glucose (Fingerstick)


 128 mg/dL


(70-99) 


 128 mg/dL


(70-99) 105 mg/dL


(70-99)


 


White Blood Count


 


 14.8 x10^3/uL


(4.0-11.0) 


 





 


Red Blood Count


 


 2.95 x10^6/uL


(3.50-5.40) 


 





 


Hemoglobin


 


 8.1 g/dL


(12.0-15.5) 


 





 


Hematocrit


 


 25.1 %


(36.0-47.0) 


 





 


Mean Corpuscular Volume  85 fL ()   


 


Mean Corpuscular Hemoglobin  27 pg (25-35)   


 


Mean Corpuscular Hemoglobin


Concent 


 32 g/dL


(31-37) 


 





 


Red Cell Distribution Width


 


 18.2 %


(11.5-14.5) 


 





 


Platelet Count


 


 452 x10^3/uL


(140-400) 


 





 


Neutrophils (%) (Auto)  80 % (31-73)   


 


Lymphocytes (%) (Auto)  10 % (24-48)   


 


Monocytes (%) (Auto)  8 % (0-9)   


 


Eosinophils (%) (Auto)  1 % (0-3)   


 


Basophils (%) (Auto)  0 % (0-3)   


 


Neutrophils # (Auto)


 


 11.9 x10^3/uL


(1.8-7.7) 


 





 


Lymphocytes # (Auto)


 


 1.4 x10^3/uL


(1.0-4.8) 


 





 


Monocytes # (Auto)


 


 1.2 x10^3/uL


(0.0-1.1) 


 





 


Eosinophils # (Auto)


 


 0.2 x10^3/uL


(0.0-0.7) 


 





 


Basophils # (Auto)


 


 0.0 x10^3/uL


(0.0-0.2) 


 





 


Sodium Level


 


 136 mmol/L


(136-145) 


 





 


Potassium Level


 


 4.4 mmol/L


(3.5-5.1) 


 





 


Chloride Level


 


 101 mmol/L


() 


 





 


Carbon Dioxide Level


 


 34 mmol/L


(21-32) 


 





 


Anion Gap  1 (6-14)   


 


Blood Urea Nitrogen


 


 14 mg/dL


(7-20) 


 





 


Creatinine


 


 0.5 mg/dL


(0.6-1.0) 


 





 


Estimated GFR


(Cockcroft-Gault) 


 131.1 


 


 





 


BUN/Creatinine Ratio  28 (6-20)   


 


Glucose Level


 


 136 mg/dL


(70-99) 


 





 


Calcium Level


 


 11.1 mg/dL


(8.5-10.1) 


 





 


Phosphorus Level


 


 3.9 mg/dL


(2.6-4.7) 


 





 


Magnesium Level


 


 2.1 mg/dL


(1.8-2.4) 


 





 


Total Bilirubin


 


 0.4 mg/dL


(0.2-1.0) 


 





 


Aspartate Amino Transf


(AST/SGOT) 


 18 U/L (15-37) 


 


 





 


Alanine Aminotransferase


(ALT/SGPT) 


 12 U/L (14-59) 


 


 





 


Alkaline Phosphatase


 


 118 U/L


() 


 





 


Total Protein


 


 4.7 g/dL


(6.4-8.2) 


 





 


Albumin


 


 1.1 g/dL


(3.4-5.0) 


 





 


Albumin/Globulin Ratio  0.3 (1.0-1.7)   


 


Triglycerides Level


 


 155 mg/dL


(0-150) 


 





 


Test


 6/30/20


15:30 6/30/20


16:10 6/30/20


21:30 7/1/20


06:00


 


White Blood Count


 49.3 x10^3/uL


(4.0-11.0) 


 21.5 x10^3/uL


(4.0-11.0) 18.9 x10^3/uL


(4.0-11.0)


 


Red Blood Count


 2.36 x10^6/uL


(3.50-5.40) 


 2.77 x10^6/uL


(3.50-5.40) 2.55 x10^6/uL


(3.50-5.40)


 


Hemoglobin


 6.7 g/dL


(12.0-15.5) 


 8.2 g/dL


(12.0-15.5) 7.5 g/dL


(12.0-15.5)


 


Hematocrit


 20.9 %


(36.0-47.0) 


 23.1 %


(36.0-47.0) 21.6 %


(36.0-47.0)


 


Mean Corpuscular Volume 89 fL ()   84 fL ()  85 fL () 


 


Mean Corpuscular Hemoglobin 28 pg (25-35)   30 pg (25-35)  29 pg (25-35) 


 


Mean Corpuscular Hemoglobin


Concent 32 g/dL


(31-37) 


 36 g/dL


(31-37) 35 g/dL


(31-37)


 


Red Cell Distribution Width


 16.7 %


(11.5-14.5) 


 14.8 %


(11.5-14.5) 14.8 %


(11.5-14.5)


 


Platelet Count


 335 x10^3/uL


(140-400) 


 211 x10^3/uL


(140-400) 253 x10^3/uL


(140-400)


 


Sodium Level


 138 mmol/L


(136-145) 


 


 138 mmol/L


(136-145)


 


Potassium Level


 4.3 mmol/L


(3.5-5.1) 


 


 3.9 mmol/L


(3.5-5.1)


 


Chloride Level


 107 mmol/L


() 


 


 104 mmol/L


()


 


Carbon Dioxide Level


 15 mmol/L


(21-32) 


 


 26 mmol/L


(21-32)


 


Anion Gap 16 (6-14)    8 (6-14) 


 


Blood Urea Nitrogen


 11 mg/dL


(7-20) 


 


 19 mg/dL


(7-20)


 


Creatinine


 0.7 mg/dL


(0.6-1.0) 


 


 0.8 mg/dL


(0.6-1.0)


 


Estimated GFR


(Cockcroft-Gault) 88.9 


 


 


 76.2 





 


Glucose Level


 169 mg/dL


(70-99) 


 


 264 mg/dL


(70-99)


 


Calcium Level


 7.5 mg/dL


(8.5-10.1) 


 


 9.2 mg/dL


(8.5-10.1)


 


Magnesium Level


 1.4 mg/dL


(1.8-2.4) 


 


 2.1 mg/dL


(1.8-2.4)


 


O2 Saturation  99 % (92-99)   


 


Arterial Blood pH


 


 7.25


(7.35-7.45) 


 





 


Arterial Blood pCO2 at


Patient Temp 


 32 mmHg


(35-46) 


 





 


Arterial Blood pO2 at Patient


Temp 


 374 mmHg


() 


 





 


Arterial Blood HCO3


 


 13 mmol/L


(21-28) 


 





 


Arterial Blood Base Excess


 


 -13 mmol/L


(-3-3) 


 





 


FiO2  80%+5   


 


Neutrophils (%) (Auto)    85 % (31-73) 


 


Lymphocytes (%) (Auto)    8 % (24-48) 


 


Monocytes (%) (Auto)    6 % (0-9) 


 


Eosinophils (%) (Auto)    0 % (0-3) 


 


Basophils (%) (Auto)    0 % (0-3) 


 


Neutrophils # (Auto)


 


 


 


 16.1 x10^3/uL


(1.8-7.7)


 


Lymphocytes # (Auto)


 


 


 


 1.5 x10^3/uL


(1.0-4.8)


 


Monocytes # (Auto)


 


 


 


 1.2 x10^3/uL


(0.0-1.1)


 


Eosinophils # (Auto)


 


 


 


 0.0 x10^3/uL


(0.0-0.7)


 


Basophils # (Auto)


 


 


 


 0.1 x10^3/uL


(0.0-0.2)


 


Phosphorus Level


 


 


 


 3.5 mg/dL


(2.6-4.7)


 


Test


 7/1/20


06:06 7/1/20


08:30 7/1/20


11:55 





 


Glucose (Fingerstick)


 249 mg/dL


(70-99) 


 235 mg/dL


(70-99) 





 


O2 Saturation  98 % (92-99)  97 % (92-99)  


 


Arterial Blood pH


 


 7.60


(7.35-7.45) 7.38


(7.35-7.45) 





 


Arterial Blood pCO2 at


Patient Temp 


 22 mmHg


(35-46) 38 mmHg


(35-46) 





 


Arterial Blood pO2 at Patient


Temp 


 120 mmHg


() 111 mmHg


() 





 


Arterial Blood HCO3


 


 21 mmol/L


(21-28) 22 mmol/L


(21-28) 





 


Arterial Blood Base Excess


 


 0 mmol/L


(-3-3) -3 mmol/L


(-3-3) 





 


FiO2  40  40  








Laboratory Tests








Test


 6/30/20


15:30 6/30/20


16:10 6/30/20


21:30 7/1/20


06:00


 


White Blood Count


 49.3 x10^3/uL


(4.0-11.0) 


 21.5 x10^3/uL


(4.0-11.0) 18.9 x10^3/uL


(4.0-11.0)


 


Red Blood Count


 2.36 x10^6/uL


(3.50-5.40) 


 2.77 x10^6/uL


(3.50-5.40) 2.55 x10^6/uL


(3.50-5.40)


 


Hemoglobin


 6.7 g/dL


(12.0-15.5) 


 8.2 g/dL


(12.0-15.5) 7.5 g/dL


(12.0-15.5)


 


Hematocrit


 20.9 %


(36.0-47.0) 


 23.1 %


(36.0-47.0) 21.6 %


(36.0-47.0)


 


Mean Corpuscular Volume 89 fL ()   84 fL ()  85 fL () 


 


Mean Corpuscular Hemoglobin 28 pg (25-35)   30 pg (25-35)  29 pg (25-35) 


 


Mean Corpuscular Hemoglobin


Concent 32 g/dL


(31-37) 


 36 g/dL


(31-37) 35 g/dL


(31-37)


 


Red Cell Distribution Width


 16.7 %


(11.5-14.5) 


 14.8 %


(11.5-14.5) 14.8 %


(11.5-14.5)


 


Platelet Count


 335 x10^3/uL


(140-400) 


 211 x10^3/uL


(140-400) 253 x10^3/uL


(140-400)


 


Sodium Level


 138 mmol/L


(136-145) 


 


 138 mmol/L


(136-145)


 


Potassium Level


 4.3 mmol/L


(3.5-5.1) 


 


 3.9 mmol/L


(3.5-5.1)


 


Chloride Level


 107 mmol/L


() 


 


 104 mmol/L


()


 


Carbon Dioxide Level


 15 mmol/L


(21-32) 


 


 26 mmol/L


(21-32)


 


Anion Gap 16 (6-14)    8 (6-14) 


 


Blood Urea Nitrogen


 11 mg/dL


(7-20) 


 


 19 mg/dL


(7-20)


 


Creatinine


 0.7 mg/dL


(0.6-1.0) 


 


 0.8 mg/dL


(0.6-1.0)


 


Estimated GFR


(Cockcroft-Gault) 88.9 


 


 


 76.2 





 


Glucose Level


 169 mg/dL


(70-99) 


 


 264 mg/dL


(70-99)


 


Calcium Level


 7.5 mg/dL


(8.5-10.1) 


 


 9.2 mg/dL


(8.5-10.1)


 


Magnesium Level


 1.4 mg/dL


(1.8-2.4) 


 


 2.1 mg/dL


(1.8-2.4)


 


O2 Saturation  99 % (92-99)   


 


Arterial Blood pH


 


 7.25


(7.35-7.45) 


 





 


Arterial Blood pCO2 at


Patient Temp 


 32 mmHg


(35-46) 


 





 


Arterial Blood pO2 at Patient


Temp 


 374 mmHg


() 


 





 


Arterial Blood HCO3


 


 13 mmol/L


(21-28) 


 





 


Arterial Blood Base Excess


 


 -13 mmol/L


(-3-3) 


 





 


FiO2  80%+5   


 


Neutrophils (%) (Auto)    85 % (31-73) 


 


Lymphocytes (%) (Auto)    8 % (24-48) 


 


Monocytes (%) (Auto)    6 % (0-9) 


 


Eosinophils (%) (Auto)    0 % (0-3) 


 


Basophils (%) (Auto)    0 % (0-3) 


 


Neutrophils # (Auto)


 


 


 


 16.1 x10^3/uL


(1.8-7.7)


 


Lymphocytes # (Auto)


 


 


 


 1.5 x10^3/uL


(1.0-4.8)


 


Monocytes # (Auto)


 


 


 


 1.2 x10^3/uL


(0.0-1.1)


 


Eosinophils # (Auto)


 


 


 


 0.0 x10^3/uL


(0.0-0.7)


 


Basophils # (Auto)


 


 


 


 0.1 x10^3/uL


(0.0-0.2)


 


Phosphorus Level


 


 


 


 3.5 mg/dL


(2.6-4.7)


 


Test


 7/1/20


06:06 7/1/20


08:30 7/1/20


11:55 





 


Glucose (Fingerstick)


 249 mg/dL


(70-99) 


 235 mg/dL


(70-99) 





 


O2 Saturation  98 % (92-99)  97 % (92-99)  


 


Arterial Blood pH


 


 7.60


(7.35-7.45) 7.38


(7.35-7.45) 





 


Arterial Blood pCO2 at


Patient Temp 


 22 mmHg


(35-46) 38 mmHg


(35-46) 





 


Arterial Blood pO2 at Patient


Temp 


 120 mmHg


() 111 mmHg


() 





 


Arterial Blood HCO3


 


 21 mmol/L


(21-28) 22 mmol/L


(21-28) 





 


Arterial Blood Base Excess


 


 0 mmol/L


(-3-3) -3 mmol/L


(-3-3) 





 


FiO2  40  40  








Microbiology


6/28/20 Blood Culture - Preliminary, Resulted


          NO GROWTH AFTER 2 DAYS


6/15/20 Gram Stain - Final, Complete


          


6/15/20 Aerobic and Anaerobic Culture - Final, Complete


          


6/13/20 Gram Stain Evaluation - Final, Complete


          


6/13/20 Respiratory Culture - Final, Complete


          


6/13/20 Antimicrobic Susceptibility - Final, Complete


          


6/7/20 Urine Culture - Final, Complete


         


5/30/20 Gram Stain - Final, Complete


          


5/30/20 Aerobic Culture - Final, Complete


Medications





Current Medications


Sodium Chloride 1,000 ml @  1,000 mls/hr Q1H IV  Last administered on 3/16/20at 

03:00;  Start 3/16/20 at 03:00;  Stop 3/16/20 at 03:59;  Status DC


Ondansetron HCl (Zofran) 4 mg 1X  ONCE IVP  Last administered on 3/16/20at 

03:27;  Start 3/16/20 at 03:00;  Stop 3/16/20 at 03:01;  Status DC


Morphine Sulfate (Morphine Sulfate) 4 mg 1X  ONCE IV ;  Start 3/16/20 at 03:00; 

Stop 3/16/20 at 03:01;  Status Cancel


Ketorolac Tromethamine (Toradol 30mg Vial) 30 mg 1X  ONCE IV  Last administered 

on 3/16/20at 02:54;  Start 3/16/20 at 03:00;  Stop 3/16/20 at 03:01;  Status DC


Fentanyl Citrate (Fentanyl 2ml Vial) 25 mcg 1X  ONCE IVP  Last administered on 

3/16/20at 03:23;  Start 3/16/20 at 03:30;  Stop 3/16/20 at 03:31;  Status DC


Fentanyl Citrate (Fentanyl 2ml Vial) 100 mcg STK-MED ONCE .ROUTE ;  Start 

3/16/20 at 03:18;  Stop 3/16/20 at 03:18;  Status DC


Iohexol (Omnipaque 350 Mg/ml) 90 ml 1X  ONCE IV  Last administered on 3/16/20at 

03:25;  Start 3/16/20 at 03:30;  Stop 3/16/20 at 03:31;  Status DC


Info (CONTRAST GIVEN -- Rx MONITORING) 1 each PRN DAILY  PRN MC SEE COMMENTS;  

Start 3/16/20 at 03:30;  Stop 3/18/20 at 03:29;  Status DC


Hydromorphone HCl (Dilaudid) 0.5 mg 1X  ONCE IV  Last administered on 3/16/20at 

03:55;  Start 3/16/20 at 04:30;  Stop 3/16/20 at 04:32;  Status DC


Ondansetron HCl (Zofran) 4 mg PRN Q8HRS  PRN IV NAUSEA/VOMITING 1ST CHOICE;  

Start 3/16/20 at 05:00;  Stop 3/16/20 at 09:27;  Status DC


Morphine Sulfate (Morphine Sulfate) 2 mg PRN Q2HR  PRN IV SEVERE PAIN 7-10 Last 

administered on 3/17/20at 12:26;  Start 3/16/20 at 05:00;  Stop 3/17/20 at 

14:15;  Status DC


Sodium Chloride 1,000 ml @  125 mls/hr Q8H IV  Last administered on 3/16/20at 

20:56;  Start 3/16/20 at 05:00;  Stop 3/17/20 at 04:59;  Status DC


Hydromorphone HCl (Dilaudid) 0.5 mg PRN Q3HRS  PRN IV SEVERE PAIN 7-10 Last 

administered on 3/17/20at 10:06;  Start 3/16/20 at 05:00;  Stop 3/17/20 at 

12:01;  Status DC


Piperacillin Sod/ Tazobactam Sod 4.5 gm/Sodium Chloride 100 ml @  200 mls/hr 1X 

ONCE IV  Last administered on 3/16/20at 05:44;  Start 3/16/20 at 06:00;  Stop 

3/16/20 at 06:29;  Status DC


Ondansetron HCl (Zofran) 4 mg PRN Q4HRS  PRN IV NAUSEA/VOMITING 1ST CHOICE Last 

administered on 6/27/20at 13:37;  Start 3/16/20 at 09:30


Insulin Human Lispro (HumaLOG) 0-9 UNITS Q6HRS SQ  Last administered on 7/1/20at

11:58;  Start 3/16/20 at 09:30


Dextrose (Dextrose 50%-Water Syringe) 12.5 gm PRN Q15MIN  PRN IV SEE COMMENTS;  

Start 3/16/20 at 09:30


Pantoprazole Sodium (PROTONIX VIAL for IV PUSH) 40 mg DAILYAC IVP  Last 

administered on 7/1/20at 09:25;  Start 3/16/20 at 11:30


Prochlorperazine Edisylate (Compazine) 10 mg PRN Q6HRS  PRN IV NAUSEA/VOMITING, 

2nd CHOICE Last administered on 6/27/20at 10:53;  Start 3/16/20 at 17:45


Atenolol (Tenormin) 100 mg DAILY PO ;  Start 3/17/20 at 09:00;  Stop 3/16/20 at 

20:08;  Status DC


Metoprolol Tartrate (Lopressor Vial) 2.5 mg Q6HRS IVP  Last administered on 

3/17/20at 05:51;  Start 3/16/20 at 20:15;  Stop 3/17/20 at 10:02;  Status DC


Metoprolol Tartrate (Lopressor Vial) 5 mg Q6HRS IVP  Last administered on 

3/26/20at 00:12;  Start 3/17/20 at 10:15;  Stop 3/28/20 at 08:48;  Status DC


Hydromorphone HCl (Dilaudid) 1 mg PRN Q3HRS  PRN IV SEVERE PAIN 7-10 Last 

administered on 3/23/20at 05:13;  Start 3/17/20 at 12:00;  Stop 3/31/20 at 

00:25;  Status DC


Lidocaine HCl (Buffered Lidocaine 1%) 3 ml STK-MED ONCE .ROUTE ;  Start 3/17/20 

at 12:55;  Stop 3/17/20 at 12:56;  Status DC


Albumin Human 500 ml @  125 mls/hr 1X  ONCE IV  Last administered on 3/17/20at 

14:33;  Start 3/17/20 at 14:30;  Stop 3/17/20 at 18:32;  Status DC


Norepinephrine Bitartrate 8 mg/ Dextrose 258 ml @  17.299 mls/ hr CONT  PRN IV 

PER PROTOCOL Last administered on 4/14/20at 12:48;  Start 3/17/20 at 15:30;  

Stop 4/17/20 at 09:19;  Status DC


Sodium Chloride 1,000 ml @  125 mls/hr Q8H IV  Last administered on 3/17/20at 

21:04;  Start 3/17/20 at 16:00;  Stop 3/18/20 at 02:42;  Status DC


Albumin Human 500 ml @  125 mls/hr PRN BID  PRN IV After every 2L NSS & BP < 

90mm Last administered on 6/30/20at 16:06;  Start 3/17/20 at 16:00


Iohexol (Omnipaque 300 Mg/ml) 60 ml 1X  ONCE IV  Last administered on 3/17/20at 

17:20;  Start 3/17/20 at 17:00;  Stop 3/17/20 at 17:01;  Status DC


Info (CONTRAST GIVEN -- Rx MONITORING) 1 each PRN DAILY  PRN MC SEE COMMENTS;  

Start 3/17/20 at 17:00;  Stop 3/19/20 at 16:59;  Status DC


Meropenem 1 gm/ Sodium Chloride 100 ml @  200 mls/hr Q8HRS IV  Last administered

on 3/18/20at 05:45;  Start 3/17/20 at 20:00;  Stop 3/18/20 at 08:48;  Status DC


Furosemide (Lasix) 40 mg 1X  ONCE IVP  Last administered on 3/17/20at 22:12;  

Start 3/17/20 at 22:30;  Stop 3/17/20 at 22:31;  Status DC


Calcium Chloride 1000 mg/Sodium Chloride 110 ml @  220 mls/hr 1X  ONCE IV  Last 

administered on 3/17/20at 22:11;  Start 3/17/20 at 22:30;  Stop 3/17/20 at 

22:59;  Status DC


Albuterol Sulfate (Ventolin Neb Soln) 2.5 mg 1X  ONCE NEB  Last administered on 

3/18/20at 00:56;  Start 3/17/20 at 22:30;  Stop 3/17/20 at 22:31;  Status DC


Insulin Human Regular (HumuLIN R VIAL) 5 unit 1X  ONCE IV  Last administered on 

3/17/20at 22:14;  Start 3/17/20 at 22:30;  Stop 3/17/20 at 22:31;  Status DC


Magnesium Sulfate 50 ml @ 25 mls/hr 1X  ONCE IV  Last administered on 3/18/20at 

02:57;  Start 3/18/20 at 03:00;  Stop 3/18/20 at 04:59;  Status DC


Calcium Gluconate 1000 mg/Sodium Chloride 110 ml @  220 mls/hr 1X  ONCE IV  Last

administered on 3/18/20at 02:46;  Start 3/18/20 at 03:00;  Stop 3/18/20 at 

03:29;  Status DC


Sodium Chloride 1,000 ml @  200 mls/hr Q5H IV  Last administered on 3/18/20at 

02:46;  Start 3/18/20 at 03:00;  Stop 3/18/20 at 10:21;  Status DC


Calcium Gluconate 1000 mg/Sodium Chloride 110 ml @  220 mls/hr 1X  ONCE IV  Last

administered on 3/18/20at 03:21;  Start 3/18/20 at 03:30;  Stop 3/18/20 at 

03:59;  Status DC


Sodium Bicarbonate 50 meq/Sodium Chloride 1,050 ml @  75 mls/hr Q14H IV  Last 

administered on 3/22/20at 21:10;  Start 3/18/20 at 07:30;  Stop 3/23/20 at 

10:28;  Status DC


Calcium Gluconate 2000 mg/Sodium Chloride 120 ml @  220 mls/hr 1X  ONCE IV  Last

administered on 3/18/20at 09:05;  Start 3/18/20 at 07:30;  Stop 3/18/20 at 

08:02;  Status DC


Lidocaine HCl (Xylocaine-Mpf 1% 2ml Vial) 2 ml STK-MED ONCE .ROUTE ;  Start 

3/18/20 at 08:47;  Stop 3/18/20 at 08:47;  Status DC


Meropenem 500 mg/ Sodium Chloride 50 ml @  100 mls/hr Q12HR IV  Last 

administered on 3/23/20at 21:01;  Start 3/18/20 at 18:00;  Stop 3/24/20 at 

07:58;  Status DC


Lidocaine HCl (Buffered Lidocaine 1%) 3 ml STK-MED ONCE .ROUTE ;  Start 3/18/20 

at 09:46;  Stop 3/18/20 at 09:46;  Status DC


Lidocaine HCl (Buffered Lidocaine 1%) 6 ml 1X  ONCE INJ  Last administered on 

3/18/20at 10:26;  Start 3/18/20 at 10:15;  Stop 3/18/20 at 10:16;  Status DC


Info (Tpn Per Pharmacy) 1 each PRN DAILY  PRN MC SEE COMMENTS Last administered 

on 6/30/20at 09:08;  Start 3/18/20 at 12:00


Sodium Chloride 1,000 ml @  1,000 mls/hr Q1H PRN IV hypotension;  Start 3/18/20 

at 12:07;  Stop 3/18/20 at 18:06;  Status DC


Diphenhydramine HCl (Benadryl) 25 mg 1X PRN  PRN IV ITCHING;  Start 3/18/20 at 

12:15;  Stop 3/19/20 at 12:14;  Status DC


Diphenhydramine HCl (Benadryl) 25 mg 1X PRN  PRN IV ITCHING;  Start 3/18/20 at 

12:15;  Stop 3/19/20 at 12:14;  Status DC


Sodium Chloride 1,000 ml @  400 mls/hr Q2H30M PRN IV PATENCY;  Start 3/18/20 at 

12:07;  Stop 3/19/20 at 00:06;  Status DC


Info (PHARMACY MONITORING -- do not chart) 1 each PRN DAILY  PRN MC SEE 

COMMENTS;  Start 3/18/20 at 12:15;  Stop 3/20/20 at 08:13;  Status DC


Sodium Chloride 90 meq/Calcium Gluconate 10 meq/ Multivitamins 10 ml/Chromium/ 

Copper/Manganese/ Seleni/Zn 1 ml/ Total Parenteral Nutrition/Amino 

Acids/Dextrose/ Fat Emulsion Intravenous 55.005 ml  @ 2.292 mls/hr TPN  CONT IV 

;  Start 3/18/20 at 22:00;  Stop 3/18/20 at 12:33;  Status DC


Info (Tpn Per Pharmacy) 1 each PRN DAILY  PRN MC SEE COMMENTS;  Start 3/18/20 at

12:30;  Status UNV


Sodium Chloride 90 meq/Calcium Gluconate 10 meq/ Multivitamins 10 ml/Chromium/ 

Copper/Manganese/ Seleni/Zn 0.5 ml/ Total Parenteral Nutrition/Amino 

Acids/Dextrose/ Fat Emulsion Intravenous 1,512 ml @  63 mls/hr TPN  CONT IV  

Last administered on 3/18/20at 22:06;  Start 3/18/20 at 22:00;  Stop 3/19/20 at 

21:59;  Status DC


Calcium Carbonate/ Glycine (Tums) 500 mg PRN AFTMEALHC  PRN PO INDIGESTION;  

Start 3/18/20 at 17:45;  Stop 5/13/20 at 10:25;  Status DC


Calcium Gluconate (Calcium Gluconate) 2,000 mg 1X  ONCE IVP  Last administered 

on 3/19/20at 02:19;  Start 3/19/20 at 02:15;  Stop 3/19/20 at 02:16;  Status DC


Calcium Chloride 3000 mg/Sodium Chloride 1,030 ml @  50 mls/hr [id number] IV  Last 

administered on 3/21/20at 02:17;  Start 3/19/20 at 08:00;  Stop 3/21/20 at 

15:23;  Status DC


Lorazepam (Ativan Inj) 1 mg PRN Q4HRS  PRN IVP ANXIETY / AGITATION, 2nd choic 

Last administered on 4/17/20at 03:51;  Start 3/19/20 at 09:00;  Stop 4/17/20 at 

09:19;  Status DC


Sodium Chloride 1,000 ml @  1,000 mls/hr Q1H PRN IV hypotension;  Start 3/19/20 

at 08:56;  Stop 3/19/20 at 14:55;  Status DC


Albumin Human 200 ml @  200 mls/hr 1X PRN  PRN IV Hypotension;  Start 3/19/20 at

09:00;  Stop 3/19/20 at 14:59;  Status DC


Diphenhydramine HCl (Benadryl) 25 mg 1X PRN  PRN IV ITCHING;  Start 3/19/20 at 

09:00;  Stop 3/20/20 at 08:59;  Status DC


Diphenhydramine HCl (Benadryl) 25 mg 1X PRN  PRN IV ITCHING;  Start 3/19/20 at 

09:00;  Stop 3/20/20 at 08:59;  Status DC


Sodium Chloride 1,000 ml @  400 mls/hr Q2H30M PRN IV PATENCY;  Start 3/19/20 at 

08:56;  Stop 3/19/20 at 20:55;  Status DC


Info (PHARMACY MONITORING -- do not chart) 1 each PRN DAILY  PRN MC SEE 

COMMENTS;  Start 3/19/20 at 09:00;  Status UNV


Info (PHARMACY MONITORING -- do not chart) 1 each PRN DAILY  PRN MC SEE 

COMMENTS;  Start 3/19/20 at 09:00;  Stop 3/20/20 at 08:13;  Status DC


Digoxin (Lanoxin) 500 mcg 1X  ONCE IV  Last administered on 3/19/20at 10:04;  

Start 3/19/20 at 10:00;  Stop 3/19/20 at 10:01;  Status DC


Digoxin (Lanoxin) 125 mcg 1X  ONCE IV  Last administered on 3/19/20at 17:10;  

Start 3/19/20 at 18:00;  Stop 3/19/20 at 18:01;  Status DC


Magnesium Sulfate 100 ml @  25 mls/hr 1X  ONCE IV  Last administered on 

3/19/20at 12:48;  Start 3/19/20 at 13:00;  Stop 3/19/20 at 16:59;  Status DC


Sodium Chloride 90 meq/Magnesium Sulfate 10 meq/ Calcium Gluconate 20 meq/ 

Multivitamins 10 ml/Chromium/ Copper/Manganese/ Seleni/Zn 0.5 ml/ Total 

Parenteral Nutrition/Amino Acids/Dextrose/ Fat Emulsion Intravenous 1,512 ml @  

63 mls/hr TPN  CONT IV  Last administered on 3/19/20at 22:25;  Start 3/19/20 at 

22:00;  Stop 3/20/20 at 21:59;  Status DC


Sodium Chloride 1,000 ml @  1,000 mls/hr Q1H PRN IV hypotension;  Start 3/20/20 

at 08:05;  Stop 3/20/20 at 14:04;  Status DC


Albumin Human 200 ml @  200 mls/hr 1X  ONCE IV  Last administered on 3/20/20at 

08:57;  Start 3/20/20 at 08:15;  Stop 3/20/20 at 09:14;  Status DC


Diphenhydramine HCl (Benadryl) 25 mg 1X PRN  PRN IV ITCHING;  Start 3/20/20 at 

08:15;  Stop 3/21/20 at 08:14;  Status DC


Diphenhydramine HCl (Benadryl) 25 mg 1X PRN  PRN IV ITCHING;  Start 3/20/20 at 

08:15;  Stop 3/21/20 at 08:14;  Status DC


Sodium Chloride 1,000 ml @  400 mls/hr Q2H30M PRN IV PATENCY;  Start 3/20/20 at 

08:05;  Stop 3/20/20 at 20:04;  Status DC


Info (PHARMACY MONITORING -- do not chart) 1 each PRN DAILY  PRN MC SEE 

COMMENTS;  Start 3/20/20 at 08:15;  Stop 3/24/20 at 07:57;  Status DC


Sodium Chloride 90 meq/Potassium Chloride 15 meq/ Potassium Phosphate 10 mmol/ 

Magnesium Sulfate 10 meq/Calcium Gluconate 20 meq/ Multivitamins 10 ml/Chromium/

Copper/Manganese/ Seleni/Zn 0.5 ml/ Total Parenteral Nutrition/Amino 

Acids/Dextrose/ Fat Emulsion Intravenous 1,512 ml @  63 mls/hr TPN  CONT IV  

Last administered on 3/20/20at 21:01;  Start 3/20/20 at 22:00;  Stop 3/21/20 at 

21:59;  Status DC


Potassium Chloride/Water 100 ml @  100 mls/hr 1X  ONCE IV  Last administered on 

3/20/20at 14:09;  Start 3/20/20 at 14:00;  Stop 3/20/20 at 14:59;  Status DC


Benzocaine (Hurricaine One) 1 spray 1X  ONCE MM  Last administered on 3/20/20at 

16:38;  Start 3/20/20 at 14:30;  Stop 3/20/20 at 14:31;  Status DC


Lidocaine HCl (Glydo (Lidocaine) Jelly) 1 thomas 1X  ONCE MM  Last administered on 

3/20/20at 16:38;  Start 3/20/20 at 14:30;  Stop 3/20/20 at 14:31;  Status DC


Linezolid/Dextrose 300 ml @  300 mls/hr Q12HR IV  Last administered on 3/26/20at

21:04;  Start 3/20/20 at 20:00;  Stop 3/27/20 at 07:50;  Status DC


Acetaminophen (Tylenol) 650 mg PRN Q6HRS  PRN PO MILD PAIN / TEMP;  Start 

3/21/20 at 03:30;  Stop 3/21/20 at 03:36;  Status DC


Acetaminophen (Tylenol) 650 mg PRN Q6HRS  PRN PEG MILD PAIN / TEMP Last 

administered on 4/16/20at 19:56;  Start 3/21/20 at 03:36;  Stop 5/13/20 at 

10:25;  Status DC


Sodium Chloride 1,000 ml @  1,000 mls/hr Q1H PRN IV hypotension;  Start 3/21/20 

at 07:50;  Stop 3/21/20 at 13:49;  Status DC


Albumin Human 200 ml @  200 mls/hr 1X PRN  PRN IV Hypotension;  Start 3/21/20 at

08:00;  Stop 3/21/20 at 13:59;  Status DC


Sodium Chloride (Normal Saline Flush) 10 ml 1X PRN  PRN IV AP catheter pack;  

Start 3/21/20 at 08:00;  Stop 3/22/20 at 07:59;  Status DC


Sodium Chloride (Normal Saline Flush) 10 ml 1X PRN  PRN IV  catheter pack;  

Start 3/21/20 at 08:00;  Stop 3/22/20 at 07:59;  Status DC


Sodium Chloride 1,000 ml @  400 mls/hr Q2H30M PRN IV PATENCY;  Start 3/21/20 at 

07:50;  Stop 3/21/20 at 19:49;  Status DC


Info (PHARMACY MONITORING -- do not chart) 1 each PRN DAILY  PRN MC SEE 

COMMENTS;  Start 3/21/20 at 08:00;  Status UNV


Info (PHARMACY MONITORING -- do not chart) 1 each PRN DAILY  PRN MC SEE 

COMMENTS;  Start 3/21/20 at 08:00;  Stop 3/23/20 at 08:25;  Status DC


Sodium Chloride 90 meq/Potassium Chloride 15 meq/ Potassium Phosphate 10 mmol/ 

Magnesium Sulfate 10 meq/Calcium Gluconate 20 meq/ Multivitamins 10 ml/Chromium/

Copper/Manganese/ Seleni/Zn 0.5 ml/ Total Parenteral Nutrition/Amino 

Acids/Dextrose/ Fat Emulsion Intravenous 1,512 ml @  63 mls/hr TPN  CONT IV  

Last administered on 3/21/20at 20:57;  Start 3/21/20 at 22:00;  Stop 3/22/20 at 

21:59;  Status DC


Sodium Chloride 90 meq/Potassium Chloride 15 meq/ Potassium Phosphate 15 mmol/ 

Magnesium Sulfate 10 meq/Calcium Gluconate 20 meq/ Multivitamins 10 ml/Chromium/

Copper/Manganese/ Seleni/Zn 0.5 ml/ Total Parenteral Nutrition/Amino 

Acids/Dextrose/ Fat Emulsion Intravenous 1,512 ml @  63 mls/hr TPN  CONT IV ;  

Start 3/22/20 at 22:00;  Stop 3/22/20 at 14:16;  Status DC


Sodium Chloride 90 meq/Potassium Chloride 15 meq/ Potassium Phosphate 15 mmol/ 

Magnesium Sulfate 10 meq/Calcium Gluconate 20 meq/ Multivitamins 10 ml/Chromium/

Copper/Manganese/ Seleni/Zn 0.5 ml/ Total Parenteral Nutrition/Amino 

Acids/Dextrose/ Fat Emulsion Intravenous 1,200 ml @  50 mls/hr TPN  CONT IV ;  

Start 3/22/20 at 22:00;  Stop 3/22/20 at 14:17;  Status DC


Sodium Chloride 90 meq/Potassium Chloride 15 meq/ Potassium Phosphate 10 mmol/ 

Magnesium Sulfate 10 meq/Calcium Gluconate 20 meq/ Multivitamins 10 ml/Chromium/

Copper/Manganese/ Seleni/Zn 0.5 ml/ Total Parenteral Nutrition/Amino 

Acids/Dextrose/ Fat Emulsion Intravenous 1,200 ml @  50 mls/hr TPN  CONT IV  

Last administered on 3/22/20at 23:29;  Start 3/22/20 at 22:00;  Stop 3/23/20 at 

21:59;  Status DC


Sodium Chloride 1,000 ml @  1,000 mls/hr Q1H PRN IV hypotension;  Start 3/23/20 

at 07:28;  Stop 3/23/20 at 13:27;  Status DC


Albumin Human 200 ml @  200 mls/hr 1X  ONCE IV  Last administered on 3/23/20at 

08:51;  Start 3/23/20 at 07:30;  Stop 3/23/20 at 08:29;  Status DC


Diphenhydramine HCl (Benadryl) 25 mg 1X PRN  PRN IV ITCHING;  Start 3/23/20 at 

07:30;  Stop 3/24/20 at 07:29;  Status DC


Diphenhydramine HCl (Benadryl) 25 mg 1X PRN  PRN IV ITCHING;  Start 3/23/20 at 

07:30;  Stop 3/24/20 at 07:29;  Status DC


Sodium Chloride 1,000 ml @  400 mls/hr Q2H30M PRN IV PATENCY;  Start 3/23/20 at 

07:28;  Stop 3/23/20 at 19:27;  Status DC


Info (PHARMACY MONITORING -- do not chart) 1 each PRN DAILY  PRN MC SEE 

COMMENTS;  Start 3/23/20 at 07:30;  Stop 4/3/20 at 13:01;  Status DC


Metronidazole 100 ml @  100 mls/hr Q6HRS IV  Last administered on 4/8/20at 

06:26;  Start 3/23/20 at 08:30;  Stop 4/8/20 at 09:58;  Status DC


Micafungin Sodium 100 mg/Dextrose 100 ml @  100 mls/hr Q24H IV  Last adm

inistered on 4/30/20at 08:18;  Start 3/23/20 at 09:00;  Stop 4/30/20 at 20:58;  

Status DC


Propofol 0 ml @ As Directed STK-MED ONCE IV ;  Start 3/23/20 at 07:53;  Stop 

3/23/20 at 07:53;  Status DC


Etomidate (Amidate) 20 mg STK-MED ONCE IV ;  Start 3/23/20 at 07:53;  Stop 

3/23/20 at 07:54;  Status DC


Midazolam HCl (Versed) 5 mg STK-MED ONCE .ROUTE ;  Start 3/23/20 at 07:57;  Stop

3/23/20 at 07:57;  Status DC


Fentanyl Citrate 30 ml @ 0 mls/hr CONT  PRN IV SEE PROTOCOL Last administered on

4/17/20at 06:12;  Start 3/23/20 at 08:15;  Stop 4/17/20 at 09:19;  Status DC


Artificial Tears (Artificial Tears) 1 drop PRN Q1HR  PRN OU DRY EYE, 1st choice;

 Start 3/23/20 at 08:15;  Stop 4/29/20 at 05:31;  Status DC


Midazolam HCl 50 mg/Sodium Chloride 50 ml @ 0 mls/hr CONT  PRN IV SEE PROTOCOL 

Last administered on 3/26/20at 22:39;  Start 3/23/20 at 08:15;  Stop 3/28/20 at 

15:59;  Status DC


Etomidate (Amidate) 8 mg 1X  ONCE IV  Last administered on 3/23/20at 08:33;  

Start 3/23/20 at 08:30;  Stop 3/23/20 at 08:31;  Status DC


Succinylcholine Chloride (Anectine) 120 mg 1X  ONCE IV  Last administered on 

3/23/20at 08:34;  Start 3/23/20 at 08:30;  Stop 3/23/20 at 08:31;  Status DC


Midazolam HCl (Versed) 5 mg 1X  ONCE IV ;  Start 3/23/20 at 08:30;  Stop 3/23/20

at 08:31;  Status DC


Potassium Chloride 15 meq/ Bicarbonate Dialysis Soln w/ out KCl 5,007.5 ml  @ 

1,000 mls/ hr Q5H1M IV  Last administered on 3/24/20at 11:11;  Start 3/23/20 at 

12:00;  Stop 3/24/20 at 11:15;  Status DC


Potassium Chloride 15 meq/ Bicarbonate Dialysis Soln w/ out KCl 5,007.5 ml  @ 

1,000 mls/ hr Q5H1M IV  Last administered on 3/24/20at 11:12;  Start 3/23/20 at 

12:00;  Stop 3/24/20 at 11:17;  Status DC


Potassium Chloride 15 meq/ Bicarbonate Dialysis Soln w/ out KCl 5,007.5 ml  @ 

1,000 mls/ hr Q5H1M IV  Last administered on 3/24/20at 11:11;  Start 3/23/20 at 

12:00;  Stop 3/24/20 at 11:19;  Status DC


Sodium Chloride 90 meq/Potassium Chloride 15 meq/ Potassium Phosphate 10 mmol/ 

Magnesium Sulfate 10 meq/Calcium Gluconate 20 meq/ Multivitamins 10 ml/Chromium/

Copper/Manganese/ Seleni/Zn 0.5 ml/ Total Parenteral Nutrition/Amino 

Acids/Dextrose/ Fat Emulsion Intravenous 1,400 ml @  58.333 mls/ hr TPN  CONT IV

 Last administered on 3/23/20at 21:42;  Start 3/23/20 at 22:00;  Stop 3/24/20 at

21:59;  Status DC


Heparin Sodium (Porcine) (Heparin Sodium) 5,000 unit Q8HRS SQ  Last administered

on 3/28/20at 05:55;  Start 3/23/20 at 15:00;  Stop 3/28/20 at 13:28;  Status DC


Meropenem 500 mg/ Sodium Chloride 50 ml @  100 mls/hr Q6HRS IV  Last 

administered on 3/25/20at 06:00;  Start 3/24/20 at 09:00;  Stop 3/25/20 at 

07:29;  Status DC


Potassium Phosphate 20 mmol/ Sodium Chloride 106.6667 ml @  51.667 m... 1X  ONCE

IV  Last administered on 3/24/20at 11:22;  Start 3/24/20 at 10:15;  Stop 3/24/20

at 12:18;  Status DC


Acetaminophen (Tylenol Supp) 650 mg PRN Q6HRS  PRN [place] MILD PAIN / TEMP > 100.3'F

Last administered on 6/29/20at 18:16;  Start 3/24/20 at 10:30


Potassium Chloride/Water 100 ml @  100 mls/hr Q1H IV  Last administered on 

3/24/20at 12:12;  Start 3/24/20 at 11:00;  Stop 3/24/20 at 12:59;  Status DC


Potassium Chloride 20 meq/ Bicarbonate Dialysis Soln w/ out KCl 5,010 ml @  

1,000 mls/hr Q5H1M IV  Last administered on 3/25/20at 08:48;  Start 3/24/20 at 

12:00;  Stop 3/25/20 at 13:03;  Status DC


Potassium Chloride 20 meq/ Bicarbonate Dialysis Soln w/ out KCl 5,010 ml @  

1,000 mls/hr Q5H1M IV  Last administered on 3/29/20at 14:52;  Start 3/24/20 at 

11:30;  Stop 3/29/20 at 19:59;  Status DC


Potassium Chloride 20 meq/ Bicarbonate Dialysis Soln w/ out KCl 5,010 ml @  

1,000 mls/hr Q5H1M IV  Last administered on 3/29/20at 14:53;  Start 3/24/20 at 

11:30;  Stop 3/29/20 at 19:59;  Status DC


Sodium Chloride 90 meq/Potassium Chloride 15 meq/ Potassium Phosphate 15 mmol/ 

Magnesium Sulfate 10 meq/Calcium Gluconate 15 meq/ Multivitamins 10 ml/Chromium/

Copper/Manganese/ Seleni/Zn 0.5 ml/ Total Parenteral Nutrition/Amino 

Acids/Dextrose/ Fat Emulsion Intravenous 1,400 ml @  58.333 mls/ hr TPN  CONT IV

 Last administered on 3/24/20at 22:17;  Start 3/24/20 at 22:00;  Stop 3/25/20 at

21:59;  Status DC


Cefepime HCl (Maxipime) 2 gm Q12HR IVP  Last administered on 4/7/20at 20:56;  

Start 3/25/20 at 09:00;  Stop 4/8/20 at 09:58;  Status DC


Daptomycin 500 mg/ Sodium Chloride 50 ml @  100 mls/hr Q48H IV  Last 

administered on 4/10/20at 09:57;  Start 3/25/20 at 08:30;  Stop 4/10/20 at 

10:07;  Status DC


Lidocaine HCl (Buffered Lidocaine 1%) 3 ml 1X  ONCE INJ  Last administered on 

3/25/20at 10:27;  Start 3/25/20 at 10:30;  Stop 3/25/20 at 10:31;  Status DC


Potassium Phosphate 20 mmol/ Sodium Chloride 106.6667 ml @  51.667 m... 1X  ONCE

IV  Last administered on 3/25/20at 12:51;  Start 3/25/20 at 13:00;  Stop 3/25/20

at 15:03;  Status DC


Sodium Chloride 90 meq/Potassium Chloride 15 meq/ Potassium Phosphate 18 mmol/ 

Magnesium Sulfate 8 meq/Calcium Gluconate 15 meq/ Multivitamins 10 ml/Chromium/ 

Copper/Manganese/ Seleni/Zn 0.5 ml/ Total Parenteral Nutrition/Amino 

Acids/Dextrose/ Fat Emulsion Intravenous 1,400 ml @  58.333 mls/ hr TPN  CONT IV

 Last administered on 3/25/20at 22:16;  Start 3/25/20 at 22:00;  Stop 3/26/20 at

21:59;  Status DC


Potassium Chloride 20 meq/ Bicarbonate Dialysis Soln w/ out KCl 5,010 ml @  

1,000 mls/hr Q5H1M IV  Last administered on 3/29/20at 14:54;  Start 3/25/20 at 

16:00;  Stop 3/29/20 at 19:59;  Status DC


Multi-Ingred Cream/Lotion/Oil/ Oint (Artificial Tears Eye Ointment) 1 thomas PRN 

Q1HR  PRN OU DRY EYE, 2nd choice Last administered on 4/13/20at 08:19;  Start 

3/25/20 at 17:30;  Stop 6/3/20 at 14:39;  Status DC


Sodium Chloride 90 meq/Potassium Chloride 15 meq/ Potassium Phosphate 18 mmol/ 

Magnesium Sulfate 8 meq/Calcium Gluconate 15 meq/ Multivitamins 10 ml/Chromium/ 

Copper/Manganese/ Seleni/Zn 0.5 ml/ Total Parenteral Nutrition/Amino 

Acids/Dextrose/ Fat Emulsion Intravenous 1,400 ml @  58.333 mls/ hr TPN  CONT IV

 Last administered on 3/26/20at 22:00;  Start 3/26/20 at 22:00;  Stop 3/27/20 at

21:59;  Status DC


Albumin Human 500 ml @  125 mls/hr 1X  ONCE IV ;  Start 3/26/20 at 14:15;  Stop 

3/26/20 at 18:14;  Status DC


Sodium Chloride 90 meq/Potassium Chloride 15 meq/ Potassium Phosphate 18 mmol/ 

Magnesium Sulfate 8 meq/Calcium Gluconate 15 meq/ Multivitamins 10 ml/Chromium/ 

Copper/Manganese/ Seleni/Zn 0.5 ml/ Insulin Human Regular 10 unit/ Total 

Parenteral Nutrition/Amino Acids/Dextrose/ Fat Emulsion Intravenous 1,400 ml @  

58.333 mls/ hr TPN  CONT IV  Last administered on 3/27/20at 21:43;  Start 

3/27/20 at 22:00;  Stop 3/28/20 at 21:59;  Status DC


Lidocaine HCl (Buffered Lidocaine 1%) 3 ml STK-MED ONCE .ROUTE ;  Start 3/25/20 

at 10:00;  Stop 3/27/20 at 13:57;  Status DC


Midazolam HCl 100 mg/Sodium Chloride 100 ml @ 7 mls/hr CONT  PRN IV SEE PROTOCOL

Last administered on 4/8/20at 15:35;  Start 3/28/20 at 16:00;  Stop 6/3/20 at 

14:38;  Status DC


Sodium Chloride 90 meq/Potassium Chloride 15 meq/ Potassium Phosphate 18 mmol/ 

Magnesium Sulfate 8 meq/Calcium Gluconate 15 meq/ Multivitamins 10 ml/Chromium/ 

Copper/Manganese/ Seleni/Zn 0.5 ml/ Insulin Human Regular 15 unit/ Total 

Parenteral Nutrition/Amino Acids/Dextrose/ Fat Emulsion Intravenous 1,400 ml @  

58.333 mls/ hr TPN  CONT IV  Last administered on 3/28/20at 20:34;  Start 

3/28/20 at 22:00;  Stop 3/29/20 at 21:59;  Status DC


Info ([facility] Electrolyte Protocol) 1 ea CONT PRN  PRN MC PER PROTOCOL;  Start 

3/29/20 at 13:15


Sodium Chloride 90 meq/Potassium Chloride 15 meq/ Potassium Phosphate 18 mmol/ 

Magnesium Sulfate 8 meq/Calcium Gluconate 15 meq/ Multivitamins 10 ml/Chromium/ 

Copper/Manganese/ Seleni/Zn 0.5 ml/ Insulin Human Regular 15 unit/ Total 

Parenteral Nutrition/Amino Acids/Dextrose/ Fat Emulsion Intravenous 1,400 ml @  

58.333 mls/ hr TPN  CONT IV  Last administered on 3/29/20at 22:05;  Start 

3/29/20 at 22:00;  Stop 3/30/20 at 21:59;  Status DC


Potassium Chloride 15 meq/ Bicarbonate Dialysis Soln w/ out KCl 5,007.5 ml  @ 

1,000 mls/ hr Q5H1M IV  Last administered on 4/1/20at 18:14;  Start 3/29/20 at 

20:00;  Stop 4/2/20 at 13:08;  Status DC


Potassium Chloride 15 meq/ Bicarbonate Dialysis Soln w/ out KCl 5,007.5 ml  @ 

1,000 mls/ hr Q5H1M IV  Last administered on 4/1/20at 18:14;  Start 3/29/20 at 

20:00;  Stop 4/2/20 at 13:08;  Status DC


Potassium Chloride 15 meq/ Bicarbonate Dialysis Soln w/ out KCl 5,007.5 ml  @ 

1,000 mls/ hr Q5H1M IV  Last administered on 4/1/20at 18:14;  Start 3/29/20 at 

20:00;  Stop 4/2/20 at 13:08;  Status DC


Iohexol (Omnipaque 240 Mg/ml) 30 ml 1X  ONCE PO  Last administered on 3/30/20at 

11:30;  Start 3/30/20 at 11:30;  Stop 3/30/20 at 11:33;  Status DC


Info (CONTRAST GIVEN -- Rx MONITORING) 1 each PRN DAILY  PRN MC SEE COMMENTS;  

Start 3/30/20 at 11:45;  Stop 4/1/20 at 11:44;  Status DC


Sodium Chloride 90 meq/Potassium Chloride 15 meq/ Potassium Phosphate 18 mmol/ 

Magnesium Sulfate 8 meq/Calcium Gluconate 15 meq/ Multivitamins 10 ml/Chromium/ 

Copper/Manganese/ Seleni/Zn 0.5 ml/ Insulin Human Regular 15 unit/ Total 

Parenteral Nutrition/Amino Acids/Dextrose/ Fat Emulsion Intravenous 1,400 ml @  

58.333 mls/ hr TPN  CONT IV  Last administered on 3/30/20at 21:47;  Start 

3/30/20 at 22:00;  Stop 3/31/20 at 21:59;  Status DC


Sodium Chloride 90 meq/Potassium Chloride 15 meq/ Potassium Phosphate 18 mmol/ 

Magnesium Sulfate 8 meq/Calcium Gluconate 15 meq/ Multivitamins 10 ml/Chromium/ 

Copper/Manganese/ Seleni/Zn 0.5 ml/ Insulin Human Regular 20 unit/ Total 

Parenteral Nutrition/Amino Acids/Dextrose/ Fat Emulsion Intravenous 1,400 ml @  

58.333 mls/ hr TPN  CONT IV  Last administered on 3/31/20at 21:36;  Start 3/31

/20 at 22:00;  Stop 4/1/20 at 21:59;  Status DC


Alteplase, Recombinant (Cathflo For Central Catheter Clearance) 1 mg 1X  ONCE IN

T CAT  Last administered on 3/31/20at 20:03;  Start 3/31/20 at 19:30;  Stop 

3/31/20 at 19:46;  Status DC


Alteplase, Recombinant (Cathflo For Central Catheter Clearance) 1 mg 1X  ONCE 

INT CAT  Last administered on 3/31/20at 22:05;  Start 3/31/20 at 22:00;  Stop 

3/31/20 at 22:01;  Status DC


Sodium Chloride 90 meq/Potassium Chloride 15 meq/ Potassium Phosphate 18 mmol/ 

Magnesium Sulfate 8 meq/Calcium Gluconate 15 meq/ Multivitamins 10 ml/Chromium/ 

Copper/Manganese/ Seleni/Zn 0.5 ml/ Insulin Human Regular 20 unit/ Total 

Parenteral Nutrition/Amino Acids/Dextrose/ Fat Emulsion Intravenous 1,400 ml @  

58.333 mls/ hr TPN  CONT IV  Last administered on 4/1/20at 21:30;  Start 4/1/20 

at 22:00;  Stop 4/2/20 at 21:59;  Status DC


Dexmedetomidine HCl 400 mcg/ Sodium Chloride 100 ml @ 0 mls/hr CONT  PRN IV 

ANXIETY / AGITATION Last administered on 5/30/20at 12:57;  Start 4/2/20 at 

08:15;  Stop 5/30/20 at 18:31;  Status DC


Sodium Chloride 500 ml @  500 mls/hr 1X PRN  PRN IV ELEVATED BP, SEE COMMENTS;  

Start 4/2/20 at 08:15


Atropine Sulfate (ATROPINE 0.5mg SYRINGE) 0.5 mg PRN Q5MIN  PRN IV SEE COMMENTS;

 Start 4/2/20 at 08:15


Furosemide (Lasix) 20 mg 1X  ONCE IVP  Last administered on 4/2/20at 08:19;  

Start 4/2/20 at 08:15;  Stop 4/2/20 at 08:16;  Status DC


Lidocaine HCl (Buffered Lidocaine 1%) 3 ml STK-MED ONCE .ROUTE ;  Start 4/2/20 

at 08:39;  Stop 4/2/20 at 08:39;  Status DC


Lidocaine HCl (Buffered Lidocaine 1%) 6 ml 1X  ONCE INJ  Last administered on 

4/2/20at 09:05;  Start 4/2/20 at 09:00;  Stop 4/2/20 at 09:06;  Status DC


Sodium Chloride 90 meq/Potassium Chloride 15 meq/ Potassium Phosphate 18 mmol/ 

Magnesium Sulfate 8 meq/Calcium Gluconate 15 meq/ Multivitamins 10 ml/Chromium/ 

Copper/Manganese/ Seleni/Zn 0.5 ml/ Insulin Human Regular 20 unit/ Total 

Parenteral Nutrition/Amino Acids/Dextrose/ Fat Emulsion Intravenous 1,400 ml @  

58.333 mls/ hr TPN  CONT IV  Last administered on 4/2/20at 22:45;  Start 4/2/20 

at 22:00;  Stop 4/3/20 at 21:59;  Status DC


Sodium Chloride 1,000 ml @  1,000 mls/hr Q1H PRN IV hypotension;  Start 4/3/20 

at 07:30;  Stop 4/3/20 at 13:29;  Status DC


Albumin Human 200 ml @  200 mls/hr 1X PRN  PRN IV Hypotension Last administered 

on 4/3/20at 09:36;  Start 4/3/20 at 07:30;  Stop 4/3/20 at 13:29;  Status DC


Sodium Chloride (Normal Saline Flush) 10 ml 1X PRN  PRN IV AP catheter pack;  

Start 4/3/20 at 07:30;  Stop 4/3/20 at 21:29;  Status DC


Sodium Chloride (Normal Saline Flush) 10 ml 1X PRN  PRN IV  catheter pack;  

Start 4/3/20 at 07:30;  Stop 4/4/20 at 07:29;  Status DC


Sodium Chloride 1,000 ml @  400 mls/hr Q2H30M PRN IV PATENCY;  Start 4/3/20 at 

07:30;  Stop 4/3/20 at 19:29;  Status DC


Info (PHARMACY MONITORING -- do not chart) 1 each PRN DAILY  PRN MC SEE 

COMMENTS;  Start 4/3/20 at 07:30;  Stop 4/3/20 at 13:02;  Status DC


Info (PHARMACY MONITORING -- do not chart) 1 each PRN DAILY  PRN MC SEE 

COMMENTS;  Start 4/3/20 at 07:30;  Stop 4/5/20 at 12:45;  Status DC


Sodium Chloride 90 meq/Potassium Chloride 15 meq/ Potassium Phosphate 10 mmol/ 

Magnesium Sulfate 8 meq/Calcium Gluconate 15 meq/ Multivitamins 10 ml/Chromium/ 

Copper/Manganese/ Seleni/Zn 0.5 ml/ Insulin Human Regular 25 unit/ Total 

Parenteral Nutrition/Amino Acids/Dextrose/ Fat Emulsion Intravenous 1,400 ml @  

58.333 mls/ hr TPN  CONT IV  Last administered on 4/3/20at 22:19;  Start 4/3/20 

at 22:00;  Stop 4/4/20 at 21:59;  Status DC


Heparin Sodium (Porcine) (Heparin Sodium) 5,000 unit Q12HR SQ  Last administered

on 4/26/20at 08:59;  Start 4/3/20 at 21:00;  Stop 4/26/20 at 10:05;  Status DC


Ondansetron HCl (Zofran) 4 mg PRN Q6HRS  PRN IV NAUSEA/VOMITING;  Start 4/6/20 

at 07:00;  Stop 4/7/20 at 06:59;  Status DC


Fentanyl Citrate (Fentanyl 2ml Vial) 25 mcg PRN Q5MIN  PRN IV MILD PAIN 1-3;  

Start 4/6/20 at 07:00;  Stop 4/7/20 at 06:59;  Status DC


Fentanyl Citrate (Fentanyl 2ml Vial) 50 mcg PRN Q5MIN  PRN IV MODERATE TO SEVERE

PAIN;  Start 4/6/20 at 07:00;  Stop 4/7/20 at 06:59;  Status DC


Ringer's Solution 1,000 ml @  30 mls/hr Q24H IV ;  Start 4/6/20 at 07:00;  Stop 

4/6/20 at 18:59;  Status DC


Lidocaine HCl (Xylocaine-Mpf 1% 2ml Vial) 2 ml PRN 1X  PRN ID PRIOR TO IV START;

 Start 4/6/20 at 07:00;  Stop 4/7/20 at 06:59;  Status DC


Prochlorperazine Edisylate (Compazine) 5 mg PACU PRN  PRN IV NAUSEA, MRX1;  

Start 4/6/20 at 07:00;  Stop 4/7/20 at 06:59;  Status DC


Sodium Chloride 1,000 ml @  1,000 mls/hr Q1H PRN IV hypotension;  Start 4/4/20 

at 09:10;  Stop 4/4/20 at 15:09;  Status DC


Albumin Human 200 ml @  200 mls/hr 1X PRN  PRN IV Hypotension Last administered 

on 4/4/20at 10:10;  Start 4/4/20 at 09:15;  Stop 4/4/20 at 15:14;  Status DC


Sodium Chloride 1,000 ml @  400 mls/hr Q2H30M PRN IV PATENCY;  Start 4/4/20 at 

09:10;  Stop 4/4/20 at 21:09;  Status DC


Info (PHARMACY MONITORING -- do not chart) 1 each PRN DAILY  PRN MC SEE 

COMMENTS;  Start 4/4/20 at 09:15;  Stop 4/5/20 at 12:45;  Status DC


Info (PHARMACY MONITORING -- do not chart) 1 each PRN DAILY  PRN MC SEE 

COMMENTS;  Start 4/4/20 at 09:15;  Stop 4/5/20 at 12:45;  Status DC


Sodium Chloride 90 meq/Potassium Chloride 15 meq/ Potassium Phosphate 10 mmol/ 

Magnesium Sulfate 8 meq/Calcium Gluconate 15 meq/ Multivitamins 10 ml/Chromium/ 

Copper/Manganese/ Seleni/Zn 0.5 ml/ Insulin Human Regular 25 unit/ Total 

Parenteral Nutrition/Amino Acids/Dextrose/ Fat Emulsion Intravenous 1,400 ml @  

58.333 mls/ hr TPN  CONT IV  Last administered on 4/4/20at 22:10;  Start 4/4/20 

at 22:00;  Stop 4/5/20 at 21:59;  Status DC


Magnesium Sulfate 50 ml @ 25 mls/hr PRN DAILY  PRN IV for Mag < 1.7 on am labs 

Last administered on 6/18/20at 10:57;  Start 4/5/20 at 09:15


Sodium Chloride 90 meq/Potassium Chloride 15 meq/ Potassium Phosphate 10 mmol/ 

Magnesium Sulfate 8 meq/Calcium Gluconate 15 meq/ Multivitamins 10 ml/Chromium/ 

Copper/Manganese/ Seleni/Zn 0.5 ml/ Insulin Human Regular 25 unit/ Total 

Parenteral Nutrition/Amino Acids/Dextrose/ Fat Emulsion Intravenous 1,400 ml @  

58.333 mls/ hr TPN  CONT IV  Last administered on 4/5/20at 21:20;  Start 4/5/20 

at 22:00;  Stop 4/6/20 at 21:59;  Status DC


Sodium Chloride 1,000 ml @  1,000 mls/hr Q1H PRN IV hypotension;  Start 4/5/20 

at 12:23;  Stop 4/5/20 at 18:22;  Status DC


Albumin Human 200 ml @  200 mls/hr 1X  ONCE IV  Last administered on 4/5/20at 

13:34;  Start 4/5/20 at 12:30;  Stop 4/5/20 at 13:29;  Status DC


Diphenhydramine HCl (Benadryl) 25 mg 1X PRN  PRN IV ITCHING;  Start 4/5/20 at 

12:30;  Stop 4/6/20 at 12:29;  Status DC


Diphenhydramine HCl (Benadryl) 25 mg 1X PRN  PRN IV ITCHING;  Start 4/5/20 at 

12:30;  Stop 4/6/20 at 12:29;  Status DC


Info (PHARMACY MONITORING -- do not chart) 1 each PRN DAILY  PRN MC SEE 

COMMENTS;  Start 4/5/20 at 12:30;  Status Cancel


Bupivacaine HCl/ Epinephrine Bitart (Sensorcain-Epi 0.5%-1:[id number] Mpf) 30 ml 

STK-MED ONCE .ROUTE  Last administered on 4/6/20at 11:44;  Start 4/6/20 at 

11:00;  Stop 4/6/20 at 11:01;  Status DC


Cellulose (Surgicel Fibrillar 1x2) 1 each STK-MED ONCE .ROUTE ;  Start 4/6/20 at

11:00;  Stop 4/6/20 at 11:01;  Status DC


Sodium Chloride 90 meq/Potassium Chloride 15 meq/ Potassium Phosphate 10 mmol/ 

Magnesium Sulfate 12 meq/Calcium Gluconate 15 meq/ Multivitamins 10 ml/Chromium/

Copper/Manganese/ Seleni/Zn 0.5 ml/ Insulin Human Regular 25 unit/ Total Pare

nteral Nutrition/Amino Acids/Dextrose/ Fat Emulsion Intravenous 1,400 ml @  

58.333 mls/ hr TPN  CONT IV  Last administered on 4/6/20at 22:24;  Start 4/6/20 

at 22:00;  Stop 4/7/20 at 21:59;  Status DC


Propofol 20 ml @ As Directed STK-MED ONCE IV ;  Start 4/6/20 at 11:07;  Stop 

4/6/20 at 11:07;  Status DC


Cellulose (Surgicel Hemostat 4x8) 1 each STK-MED ONCE .ROUTE  Last administered 

on 4/6/20at 11:44;  Start 4/6/20 at 11:55;  Stop 4/6/20 at 11:56;  Status DC


Sevoflurane (Ultane) 60 ml STK-MED ONCE IH ;  Start 4/6/20 at 12:46;  Stop 

4/6/20 at 12:46;  Status DC


Sodium Chloride 1,000 ml @  1,000 mls/hr Q1H PRN IV hypotension;  Start 4/6/20 

at 13:51;  Stop 4/6/20 at 19:50;  Status DC


Albumin Human 200 ml @  200 mls/hr 1X PRN  PRN IV Hypotension Last administered 

on 4/6/20at 14:51;  Start 4/6/20 at 14:00;  Stop 4/6/20 at 19:59;  Status DC


Diphenhydramine HCl (Benadryl) 25 mg 1X PRN  PRN IV ITCHING;  Start 4/6/20 at 

14:00;  Stop 4/7/20 at 13:59;  Status DC


Diphenhydramine HCl (Benadryl) 25 mg 1X PRN  PRN IV ITCHING;  Start 4/6/20 at 

14:00;  Stop 4/7/20 at 13:59;  Status DC


Sodium Chloride 1,000 ml @  400 mls/hr Q2H30M PRN IV PATENCY;  Start 4/6/20 at 

13:51;  Stop 4/7/20 at 01:50;  Status DC


Info (PHARMACY MONITORING -- do not chart) 1 each PRN DAILY  PRN MC SEE VITO

NTS;  Start 4/6/20 at 14:00;  Stop 4/9/20 at 08:16;  Status DC


Heparin Sodium (Porcine) (Hep Lock Adult) 500 unit STK-MED ONCE IVP ;  Start 

4/7/20 at 09:29;  Stop 4/7/20 at 09:30;  Status DC


Sodium Chloride 1,000 ml @  1,000 mls/hr Q1H PRN IV hypotension;  Start 4/7/20 

at 10:43;  Stop 4/7/20 at 16:42;  Status DC


Sodium Chloride 1,000 ml @  400 mls/hr Q2H30M PRN IV PATENCY;  Start 4/7/20 at 

10:43;  Stop 4/7/20 at 22:42;  Status DC


Info (PHARMACY MONITORING -- do not chart) 1 each PRN DAILY  PRN MC SEE MIKEY

TS;  Start 4/7/20 at 10:45;  Status UNV


Info (PHARMACY MONITORING -- do not chart) 1 each PRN DAILY  PRN MC SEE 

COMMENTS;  Start 4/7/20 at 10:45;  Status UNV


Sodium Chloride 90 meq/Potassium Chloride 15 meq/ Magnesium Sulfate 12 

meq/Calcium Gluconate 15 meq/ Multivitamins 10 ml/Chromium/ Copper/Manganese/ 

Seleni/Zn 0.5 ml/ Insulin Human Regular 25 unit/ Total Parenteral Nutrition/A

rajesh Acids/Dextrose/ Fat Emulsion Intravenous 1,400 ml @  58.333 mls/ hr TPN  

CONT IV  Last administered on 4/7/20at 22:13;  Start 4/7/20 at 22:00;  Stop 

4/8/20 at 21:59;  Status DC


Sodium Chloride 1,000 ml @  1,000 mls/hr Q1H PRN IV hypotension;  Start 4/8/20 

at 07:50;  Stop 4/8/20 at 13:49;  Status DC


Albumin Human 200 ml @  200 mls/hr 1X  ONCE IV ;  Start 4/8/20 at 08:00;  Stop 

4/8/20 at 08:53;  Status DC


Diphenhydramine HCl (Benadryl) 25 mg 1X PRN  PRN IV ITCHING;  Start 4/8/20 at 

08:00;  Stop 4/9/20 at 07:59;  Status DC


Diphenhydramine HCl (Benadryl) 25 mg 1X PRN  PRN IV ITCHING;  Start 4/8/20 at 

08:00;  Stop 4/9/20 at 07:59;  Status DC


Info (PHARMACY MONITORING -- do not chart) 1 each PRN DAILY  PRN MC SEE 

COMMENTS;  Start 4/8/20 at 08:00;  Stop 4/9/20 at 08:16;  Status DC


Albumin Human 50 ml @ 50 mls/hr 1X  ONCE IV ;  Start 4/8/20 at 08:53;  Stop 

4/8/20 at 08:56;  Status DC


Albumin Human 200 ml @  50 mls/hr PRN 1X  PRN IV HYPOTENSION Last administered 

on 4/14/20at 11:54;  Start 4/8/20 at 09:00;  Stop 5/21/20 at 11:14;  Status DC


Meropenem 500 mg/ Sodium Chloride 50 ml @  100 mls/hr Q12H IV  Last administered

on 4/28/20at 10:45;  Start 4/8/20 at 10:00;  Stop 4/28/20 at 12:37;  Status DC


Sodium Chloride 90 meq/Magnesium Sulfate 12 meq/ Calcium Gluconate 15 meq/ 

Multivitamins 10 ml/Chromium/ Copper/Manganese/ Seleni/Zn 0.5 ml/ Insulin Human 

Regular 25 unit/ Total Parenteral Nutrition/Amino Acids/Dextrose/ Fat Emulsion 

Intravenous 1,400 ml @  58.333 mls/ hr TPN  CONT IV  Last administered on 

4/8/20at 21:41;  Start 4/8/20 at 22:00;  Stop 4/9/20 at 21:59;  Status DC


Sodium Chloride 1,000 ml @  1,000 mls/hr Q1H PRN IV hypotension;  Start 4/9/20 

at 07:58;  Stop 4/9/20 at 13:57;  Status DC


Albumin Human 200 ml @  200 mls/hr 1X PRN  PRN IV Hypotension Last administered 

on 4/9/20at 09:30;  Start 4/9/20 at 08:00;  Stop 4/9/20 at 13:59;  Status DC


Sodium Chloride 1,000 ml @  400 mls/hr Q2H30M PRN IV PATENCY;  Start 4/9/20 at 

07:58;  Stop 4/9/20 at 19:57;  Status DC


Info (PHARMACY MONITORING -- do not chart) 1 each PRN DAILY  PRN MC SEE 

COMMENTS;  Start 4/9/20 at 08:00;  Status Cancel


Info (PHARMACY MONITORING -- do not chart) 1 each PRN DAILY  PRN MC SEE 

COMMENTS;  Start 4/9/20 at 08:15;  Status UNV


Sodium Chloride 90 meq/Potassium Phosphate 5 mmol/ Magnesium Sulfate 12 

meq/Calcium Gluconate 15 meq/ Multivitamins 10 ml/Chromium/ Copper/Manganese/ 

Seleni/Zn 0.5 ml/ Insulin Human Regular 30 unit/ Total Parenteral 

Nutrition/Amino Acids/Dextrose/ Fat Emulsion Intravenous 1,400 ml @  58.333 mls/

hr TPN  CONT IV  Last administered on 4/9/20at 22:08;  Start 4/9/20 at 22:00;  

Stop 4/10/20 at 21:59;  Status DC


Linezolid/Dextrose 300 ml @  300 mls/hr Q12HR IV  Last administered on 4/20/20at

20:40;  Start 4/10/20 at 11:00;  Stop 4/21/20 at 08:10;  Status DC


Sodium Chloride 90 meq/Potassium Phosphate 15 mmol/ Magnesium Sulfate 12 

meq/Calcium Gluconate 15 meq/ Multivitamins 10 ml/Chromium/ Copper/Manganese/ Se

aram/Zn 0.5 ml/ Insulin Human Regular 30 unit/ Total Parenteral Nutrition/Amino 

Acids/Dextrose/ Fat Emulsion Intravenous 1,400 ml @  58.333 mls/ hr TPN  CONT IV

 Last administered on 4/10/20at 21:49;  Start 4/10/20 at 22:00;  Stop 4/11/20 at

21:59;  Status DC


Sodium Chloride 90 meq/Potassium Phosphate 15 mmol/ Magnesium Sulfate 12 

meq/Calcium Gluconate 15 meq/ Multivitamins 10 ml/Chromium/ Copper/Manganese/ 

Seleni/Zn 0.5 ml/ Insulin Human Regular 40 unit/ Total Parenteral 

Nutrition/Amino Acids/Dextrose/ Fat Emulsion Intravenous 1,400 ml @  58.333 mls/

hr TPN  CONT IV  Last administered on 4/11/20at 21:21;  Start 4/11/20 at 22:00; 

Stop 4/12/20 at 21:59;  Status DC


Sodium Chloride 1,000 ml @  1,000 mls/hr Q1H PRN IV hypotension;  Start 4/11/20 

at 13:26;  Stop 4/11/20 at 19:25;  Status DC


Albumin Human 200 ml @  200 mls/hr 1X PRN  PRN IV Hypotension Last administered 

on 4/11/20at 15:00;  Start 4/11/20 at 13:30;  Stop 4/11/20 at 19:29;  Status DC


Sodium Chloride (Normal Saline Flush) 10 ml 1X PRN  PRN IV AP catheter pack;  

Start 4/11/20 at 13:30;  Stop 4/12/20 at 13:29;  Status DC


Sodium Chloride (Normal Saline Flush) 10 ml 1X PRN  PRN IV  catheter pack;  

Start 4/11/20 at 13:30;  Stop 4/12/20 at 13:29;  Status DC


Sodium Chloride 1,000 ml @  400 mls/hr Q2H30M PRN IV PATENCY;  Start 4/11/20 at 

13:26;  Stop 4/12/20 at 01:25;  Status DC


Info (PHARMACY MONITORING -- do not chart) 1 each PRN DAILY  PRN MC SEE 

COMMENTS;  Start 4/11/20 at 13:30;  Stop 4/11/20 at 13:33;  Status DC


Info (PHARMACY MONITORING -- do not chart) 1 each PRN DAILY  PRN MC SEE 

COMMENTS;  Start 4/11/20 at 13:30;  Stop 4/11/20 at 13:34;  Status DC


Sodium Chloride 90 meq/Potassium Phosphate 19 mmol/ Magnesium Sulfate 12 

meq/Calcium Gluconate 15 meq/ Multivitamins 10 ml/Chromium/ Copper/Manganese/ 

Seleni/Zn 0.5 ml/ Insulin Human Regular 40 unit/ Total Parenteral 

Nutrition/Amino Acids/Dextrose/ Fat Emulsion Intravenous 1,400 ml @  58.333 mls/

hr TPN  CONT IV  Last administered on 4/12/20at 21:54;  Start 4/12/20 at 22:00; 

Stop 4/13/20 at 21:59;  Status DC


Sodium Chloride 1,000 ml @  1,000 mls/hr Q1H PRN IV hypotension;  Start 4/13/20 

at 09:35;  Stop 4/13/20 at 15:34;  Status DC


Albumin Human 200 ml @  200 mls/hr 1X PRN  PRN IV Hypotension;  Start 4/13/20 at

09:45;  Stop 4/13/20 at 15:44;  Status DC


Diphenhydramine HCl (Benadryl) 25 mg 1X PRN  PRN IV ITCHING;  Start 4/13/20 at 

09:45;  Stop 4/14/20 at 09:44;  Status DC


Diphenhydramine HCl (Benadryl) 25 mg 1X PRN  PRN IV ITCHING;  Start 4/13/20 at 

09:45;  Stop 4/14/20 at 09:44;  Status DC


Sodium Chloride 1,000 ml @  400 mls/hr Q2H30M PRN IV PATENCY;  Start 4/13/20 at 

09:35;  Stop 4/13/20 at 21:34;  Status DC


Info (PHARMACY MONITORING -- do not chart) 1 each PRN DAILY  PRN MC SEE 

COMMENTS;  Start 4/13/20 at 09:45;  Status Cancel


Sodium Chloride 100 meq/Potassium Phosphate 19 mmol/ Magnesium Sulfate 12 

meq/Calcium Gluconate 15 meq/ Multivitamins 10 ml/Chromium/ Copper/Manganese/ 

Seleni/Zn 0.5 ml/ Insulin Human Regular 40 unit/ Potassium Chloride 20 meq/ 

Total Parenteral Nutrition/Amino Acids/Dextrose/ Fat Emulsion Intravenous 1,400 

ml @  58.333 mls/ hr TPN  CONT IV  Last administered on 4/13/20at 22:02;  Start 

4/13/20 at 22:00;  Stop 4/14/20 at 21:59;  Status DC


Furosemide (Lasix) 40 mg 1X  ONCE IVP  Last administered on 4/13/20at 14:39;  

Start 4/13/20 at 14:30;  Stop 4/13/20 at 14:31;  Status DC


Metronidazole 100 ml @  100 mls/hr Q8HRS IV  Last administered on 4/21/20at 

06:04;  Start 4/14/20 at 10:00;  Stop 4/21/20 at 08:10;  Status DC


Sodium Chloride 1,000 ml @  1,000 mls/hr Q1H PRN IV hypotension;  Start 4/14/20 

at 08:00;  Stop 4/14/20 at 13:59;  Status DC


Albumin Human 200 ml @  200 mls/hr 1X PRN  PRN IV Hypotension;  Start 4/14/20 at

08:00;  Stop 4/14/20 at 13:59;  Status DC


Sodium Chloride 1,000 ml @  400 mls/hr Q2H30M PRN IV PATENCY;  Start 4/14/20 at 

08:00;  Stop 4/14/20 at 19:59;  Status DC


Info (PHARMACY MONITORING -- do not chart) 1 each PRN DAILY  PRN MC SEE 

COMMENTS;  Start 4/14/20 at 11:30;  Status UNV


Info (PHARMACY MONITORING -- do not chart) 1 each PRN DAILY  PRN MC SEE 

COMMENTS;  Start 4/14/20 at 11:30;  Stop 4/16/20 at 12:13;  Status DC


Sodium Chloride 100 meq/Potassium Phosphate 19 mmol/ Magnesium Sulfate 12 

meq/Calcium Gluconate 15 meq/ Multivitamins 10 ml/Chromium/ Copper/Manganese/ 

Seleni/Zn 0.5 ml/ Insulin Human Regular 40 unit/ Potassium Chloride 20 meq/ 

Total Parenteral Nutrition/Amino Acids/Dextrose/ Fat Emulsion Intravenous 1,400 

ml @  58.333 mls/ hr TPN  CONT IV  Last administered on 4/14/20at 21:52;  Start 

4/14/20 at 22:00;  Stop 4/15/20 at 21:59;  Status DC


Sodium Chloride (Normal Saline Flush) 10 ml QSHIFT  PRN IV AFTER MEDS AND BLOOD 

DRAWS;  Start 4/14/20 at 15:00;  Stop 5/12/20 at 11:27;  Status DC


Sodium Chloride (Normal Saline Flush) 10 ml PRN Q5MIN  PRN IV AFTER MEDS AND 

BLOOD DRAWS;  Start 4/14/20 at 15:00


Sodium Chloride (Normal Saline Flush) 20 ml PRN Q5MIN  PRN IV AFTER MEDS AND 

BLOOD DRAWS;  Start 4/14/20 at 15:00


Sodium Chloride 100 meq/Potassium Phosphate 19 mmol/ Magnesium Sulfate 12 

meq/Calcium Gluconate 15 meq/ Multivitamins 10 ml/Chromium/ Copper/Manganese/ 

Seleni/Zn 0.5 ml/ Insulin Human Regular 40 unit/ Potassium Chloride 20 meq/ 

Total Parenteral Nutrition/Amino Acids/Dextrose/ Fat Emulsion Intravenous 1,400 

ml @  58.333 mls/ hr TPN  CONT IV  Last administered on 4/15/20at 21:20;  Start 

4/15/20 at 22:00;  Stop 4/16/20 at 21:59;  Status DC


Lidocaine HCl (Buffered Lidocaine 1%) 3 ml STK-MED ONCE .ROUTE ;  Start 4/15/20 

at 13:16;  Stop 4/15/20 at 13:16;  Status DC


Lidocaine HCl (Buffered Lidocaine 1%) 6 ml 1X  ONCE INJ  Last administered on 

4/15/20at 13:45;  Start 4/15/20 at 13:30;  Stop 4/15/20 at 13:31;  Status DC


Albumin Human 100 ml @  100 mls/hr 1X  ONCE IV  Last administered on 4/15/20at 

15:41;  Start 4/15/20 at 15:00;  Stop 4/15/20 at 15:59;  Status DC


Albumin Human 50 ml @ 50 mls/hr 1X  ONCE IV  Last administered on 4/15/20at 

15:00;  Start 4/15/20 at 15:00;  Stop 4/15/20 at 15:59;  Status DC


Info (PHARMACY MONITORING -- do not chart) 1 each PRN DAILY  PRN MC SEE 

COMMENTS;  Start 4/16/20 at 11:30;  Status Cancel


Info (PHARMACY MONITORING -- do not chart) 1 each PRN DAILY  PRN MC SEE 

COMMENTS;  Start 4/16/20 at 11:30;  Status UNV


Sodium Chloride 100 meq/Potassium Phosphate 10 mmol/ Magnesium Sulfate 12 

meq/Calcium Gluconate 15 meq/ Multivitamins 10 ml/Chromium/ Copper/Manganese/ 

Seleni/Zn 0.5 ml/ Insulin Human Regular 35 unit/ Potassium Chloride 20 meq/ 

Total Parenteral Nutrition/Amino Acids/Dextrose/ Fat Emulsion Intravenous 1,400 

ml @  58.333 mls/ hr TPN  CONT IV  Last administered on 4/16/20at 22:10;  Start 

4/16/20 at 22:00;  Stop 4/17/20 at 21:59;  Status DC


Sodium Chloride 100 meq/Potassium Phosphate 5 mmol/ Magnesium Sulfate 12 

meq/Calcium Gluconate 15 meq/ Multivitamins 10 ml/Chromium/ Copper/Manganese/ 

Seleni/Zn 0.5 ml/ Insulin Human Regular 35 unit/ Potassium Chloride 20 meq/ 

Total Parenteral Nutrition/Amino Acids/Dextrose/ Fat Emulsion Intravenous 1,400 

ml @  58.333 mls/ hr TPN  CONT IV  Last administered on 4/17/20at 22:59;  Start 

4/17/20 at 22:00;  Stop 4/18/20 at 21:59;  Status DC


Sodium Chloride 1,000 ml @  1,000 mls/hr Q1H PRN IV hypotension;  Start 4/18/20 

at 08:27;  Stop 4/18/20 at 14:26;  Status DC


Albumin Human 200 ml @  200 mls/hr 1X PRN  PRN IV Hypotension Last administered 

on 4/18/20at 09:18;  Start 4/18/20 at 08:30;  Stop 4/18/20 at 14:29;  Status DC


Sodium Chloride 1,000 ml @  400 mls/hr Q2H30M PRN IV PATENCY;  Start 4/18/20 at 

08:27;  Stop 4/18/20 at 20:26;  Status DC


Info (PHARMACY MONITORING -- do not chart) 1 each PRN DAILY  PRN MC SEE 

COMMENTS;  Start 4/18/20 at 08:30;  Status Cancel


Info (PHARMACY MONITORING -- do not chart) 1 each PRN DAILY  PRN MC SEE 

COMMENTS;  Start 4/18/20 at 08:30;  Stop 4/26/20 at 13:10;  Status DC


Sodium Chloride 100 meq/Potassium Chloride 40 meq/ Magnesium Sulfate 15 

meq/Calcium Gluconate 15 meq/ Multivitamins 10 ml/Chromium/ Copper/Manganese/ 

Seleni/Zn 0.5 ml/ Insulin Human Regular 35 unit/ Total Parenteral 

Nutrition/Amino Acids/Dextrose/ Fat Emulsion Intravenous 1,400 ml @  58.333 mls/

hr TPN  CONT IV  Last administered on 4/18/20at 22:00;  Start 4/18/20 at 22:00; 

Stop 4/19/20 at 21:59;  Status DC


Potassium Chloride/Water 100 ml @  100 mls/hr 1X  ONCE IV  Last administered on 

4/18/20at 17:28;  Start 4/18/20 at 14:45;  Stop 4/18/20 at 15:44;  Status DC


Sodium Chloride 100 meq/Potassium Chloride 40 meq/ Magnesium Sulfate 15 

meq/Calcium Gluconate 15 meq/ Multivitamins 10 ml/Chromium/ Copper/Manganese/ 

Seleni/Zn 0.5 ml/ Insulin Human Regular 35 unit/ Total Parenteral 

Nutrition/Amino Acids/Dextrose/ Fat Emulsion Intravenous 1,400 ml @  58.333 mls/

hr TPN  CONT IV  Last administered on 4/19/20at 22:46;  Start 4/19/20 at 22:00; 

Stop 4/20/20 at 21:59;  Status DC


Sodium Chloride 100 meq/Potassium Chloride 40 meq/ Magnesium Sulfate 20 

meq/Calcium Gluconate 15 meq/ Multivitamins 10 ml/Chromium/ Copper/Manganese/ 

Seleni/Zn 0.5 ml/ Insulin Human Regular 35 unit/ Total Parenteral Nutriti

on/Amino Acids/Dextrose/ Fat Emulsion Intravenous 1,400 ml @  58.333 mls/ hr TPN

 CONT IV  Last administered on 4/20/20at 22:31;  Start 4/20/20 at 22:00;  Stop 

4/21/20 at 21:59;  Status DC


Fentanyl Citrate (Fentanyl 2ml Vial) 50 mcg PRN Q2HR  PRN IVP PAIN Last 

administered on 4/27/20at 13:32;  Start 4/20/20 at 21:00;  Stop 4/28/20 at 

12:53;  Status DC


Fentanyl Citrate (Fentanyl 2ml Vial) 25 mcg PRN Q2HR  PRN IVP PAIN;  Start 

4/20/20 at 21:00;  Stop 4/28/20 at 12:54;  Status DC


Enoxaparin Sodium (Lovenox 100mg Syringe) 100 mg Q12HR SQ ;  Start 4/21/20 at 

21:00;  Status UNV


Amino Acids/ Glycerin/ Electrolytes 1,000 ml @  75 mls/hr [id number] IV ;  Start 

4/20/20 at 21:15;  Status UNV


Sodium Chloride 1,000 ml @  1,000 mls/hr Q1H PRN IV hypotension;  Start 4/21/20 

at 07:56;  Stop 4/21/20 at 13:55;  Status DC


Albumin Human 200 ml @  200 mls/hr 1X PRN  PRN IV Hypotension Last administered 

on 4/21/20at 08:40;  Start 4/21/20 at 08:00;  Stop 4/21/20 at 13:59;  Status DC


Sodium Chloride 1,000 ml @  400 mls/hr Q2H30M PRN IV PATENCY;  Start 4/21/20 at 

07:56;  Stop 4/21/20 at 19:55;  Status DC


Info (PHARMACY MONITORING -- do not chart) 1 each PRN DAILY  PRN MC SEE 

COMMENTS;  Start 4/21/20 at 08:00;  Status UNV


Info (PHARMACY MONITORING -- do not chart) 1 each PRN DAILY  PRN MC SEE 

COMMENTS;  Start 4/21/20 at 08:00;  Status UNV


Daptomycin 430 mg/ Sodium Chloride 50 ml @  100 mls/hr Q24H IV  Last adminis

tered on 4/21/20at 12:35;  Start 4/21/20 at 09:00;  Stop 4/21/20 at 12:49;  

Status DC


Sodium Chloride 100 meq/Potassium Chloride 40 meq/ Magnesium Sulfate 20 

meq/Calcium Gluconate 15 meq/ Multivitamins 10 ml/Chromium/ Copper/Manganese/ 

Seleni/Zn 0.5 ml/ Insulin Human Regular 35 unit/ Total Parenteral 

Nutrition/Amino Acids/Dextrose/ Fat Emulsion Intravenous 1,400 ml @  58.333 mls/

hr TPN  CONT IV  Last administered on 4/21/20at 21:26;  Start 4/21/20 at 22:00; 

Stop 4/22/20 at 21:59;  Status DC


Daptomycin 430 mg/ Sodium Chloride 50 ml @  100 mls/hr Q48H IV ;  Start 4/23/20 

at 09:00;  Stop 4/22/20 at 11:55;  Status DC


Sodium Chloride 100 meq/Potassium Chloride 40 meq/ Magnesium Sulfate 20 

meq/Calcium Gluconate 15 meq/ Multivitamins 10 ml/Chromium/ Copper/Manganese/ 

Seleni/Zn 0.5 ml/ Insulin Human Regular 35 unit/ Total Parenteral 

Nutrition/Amino Acids/Dextrose/ Fat Emulsion Intravenous 1,400 ml @  58.333 mls/

hr TPN  CONT IV  Last administered on 4/22/20at 22:27;  Start 4/22/20 at 22:00; 

Stop 4/23/20 at 21:59;  Status DC


Daptomycin 430 mg/ Sodium Chloride 50 ml @  100 mls/hr Q24H IV  Last 

administered on 4/24/20at 15:07;  Start 4/22/20 at 13:00;  Stop 4/25/20 at 

13:15;  Status DC


Sodium Chloride 100 meq/Potassium Chloride 40 meq/ Magnesium Sulfate 20 

meq/Calcium Gluconate 10 meq/ Multivitamins 10 ml/Chromium/ Copper/Manganese/ 

Seleni/Zn 0.5 ml/ Insulin Human Regular 35 unit/ Total Parenteral 

Nutrition/Amino Acids/Dextrose/ Fat Emulsion Intravenous 1,400 ml @  58.333 mls/

hr TPN  CONT IV  Last administered on 4/24/20at 00:06;  Start 4/23/20 at 22:00; 

Stop 4/24/20 at 21:59;  Status DC


Alteplase, Recombinant (Cathflo For Central Catheter Clearance) 1 mg 1X  ONCE 

INT CAT  Last administered on 4/24/20at 11:44;  Start 4/24/20 at 10:45;  Stop 

4/24/20 at 10:46;  Status DC


Ondansetron HCl (Zofran) 4 mg PRN Q6HRS  PRN IV NAUSEA/VOMITING;  Start 4/27/20 

at 07:00;  Stop 4/28/20 at 06:59;  Status DC


Fentanyl Citrate (Fentanyl 2ml Vial) 25 mcg PRN Q5MIN  PRN IV MILD PAIN 1-3;  

Start 4/27/20 at 07:00;  Stop 4/28/20 at 06:59;  Status DC


Fentanyl Citrate (Fentanyl 2ml Vial) 50 mcg PRN Q5MIN  PRN IV MODERATE TO SEVERE

PAIN Last administered on 4/27/20at 10:17;  Start 4/27/20 at 07:00;  Stop 

4/28/20 at 06:59;  Status DC


Ringer's Solution 1,000 ml @  30 mls/hr Q24H IV ;  Start 4/27/20 at 07:00;  Stop

4/27/20 at 18:59;  Status DC


Lidocaine HCl (Xylocaine-Mpf 1% 2ml Vial) 2 ml PRN 1X  PRN ID PRIOR TO IV START;

 Start 4/27/20 at 07:00;  Stop 4/28/20 at 06:59;  Status DC


Prochlorperazine Edisylate (Compazine) 5 mg PACU PRN  PRN IV NAUSEA, MRX1;  

Start 4/27/20 at 07:00;  Stop 4/28/20 at 06:59;  Status DC


Sodium Acetate 50 meq/Potassium Acetate 55 meq/ Magnesium Sulfate 20 meq/Calcium

Gluconate 10 meq/ Multivitamins 10 ml/Chromium/ Copper/Manganese/ Seleni/Zn 0.5 

ml/ Insulin Human Regular 35 unit/ Total Parenteral Nutrition/Amino 

Acids/Dextrose/ Fat Emulsion Intravenous 1,400 ml @  58.333 mls/ hr TPN  CONT IV

;  Start 4/24/20 at 22:00;  Stop 4/24/20 at 14:15;  Status DC


Sodium Acetate 50 meq/Potassium Acetate 55 meq/ Magnesium Sulfate 20 meq/Calcium

Gluconate 10 meq/ Multivitamins 10 ml/Chromium/ Copper/Manganese/ Seleni/Zn 0.5 

ml/ Insulin Human Regular 35 unit/ Total Parenteral Nutrition/Amino 

Acids/Dextrose/ Fat Emulsion Intravenous 1,800 ml @  75 mls/hr TPN  CONT IV  

Last administered on 4/24/20at 22:38;  Start 4/24/20 at 22:00;  Stop 4/25/20 at 

21:59;  Status DC


Sodium Chloride 1,000 ml @  1,000 mls/hr Q1H PRN IV hypotension;  Start 4/24/20 

at 15:31;  Stop 4/24/20 at 21:30;  Status DC


Diphenhydramine HCl (Benadryl) 25 mg 1X PRN  PRN IV ITCHING;  Start 4/24/20 at 

15:45;  Stop 4/25/20 at 15:44;  Status DC


Diphenhydramine HCl (Benadryl) 25 mg 1X PRN  PRN IV ITCHING;  Start 4/24/20 at 

15:45;  Stop 4/25/20 at 15:44;  Status DC


Sodium Chloride 1,000 ml @  400 mls/hr Q2H30M PRN IV PATENCY;  Start 4/24/20 at 

15:31;  Stop 4/25/20 at 03:30;  Status DC


Info (PHARMACY MONITORING -- do not chart) 1 each PRN DAILY  PRN MC SEE COMME

NTS;  Start 4/24/20 at 15:45;  Stop 5/26/20 at 14:14;  Status DC


Sodium Acetate 50 meq/Potassium Acetate 55 meq/ Magnesium Sulfate 20 meq/Calcium

Gluconate 10 meq/ Multivitamins 10 ml/Chromium/ Copper/Manganese/ Seleni/Zn 0.5 

ml/ Insulin Human Regular 35 unit/ Total Parenteral Nutrition/Amino 

Acids/Dextrose/ Fat Emulsion Intravenous 1,800 ml @  75 mls/hr TPN  CONT IV  

Last administered on 4/25/20at 22:03;  Start 4/25/20 at 22:00;  Stop 4/26/20 at 

21:59;  Status DC


Daptomycin 430 mg/ Sodium Chloride 50 ml @  100 mls/hr Q24H IV  Last 

administered on 4/30/20at 13:00;  Start 4/25/20 at 13:00;  Stop 4/30/20 at 

20:58;  Status DC


Heparin Sodium (Porcine) 1000 unit/Sodium Chloride 1,001 ml @  1,001 mls/hr 1X  

ONCE IRR ;  Start 4/27/20 at 06:00;  Stop 4/27/20 at 06:59;  Status DC


Potassium Acetate 55 meq/Magnesium Sulfate 20 meq/ Calcium Gluconate 10 meq/ 

Multivitamins 10 ml/Chromium/ Copper/Manganese/ Seleni/Zn 0.5 ml/ Insulin Human 

Regular 35 unit/ Total Parenteral Nutrition/Amino Acids/Dextrose/ Fat Emulsion 

Intravenous 1,920 ml @  80 mls/hr TPN  CONT IV  Last administered on 4/26/20at 

22:10;  Start 4/26/20 at 22:00;  Stop 4/27/20 at 21:59;  Status DC


Dexamethasone Sodium Phosphate (Decadron) 4 mg STK-MED ONCE .ROUTE ;  Start 

4/27/20 at 10:56;  Stop 4/27/20 at 10:57;  Status DC


Ondansetron HCl (Zofran) 4 mg STK-MED ONCE .ROUTE ;  Start 4/27/20 at 10:56;  

Stop 4/27/20 at 10:57;  Status DC


Rocuronium Bromide (Zemuron) 50 mg STK-MED ONCE .ROUTE ;  Start 4/27/20 at 

10:56;  Stop 4/27/20 at 10:57;  Status DC


Fentanyl Citrate (Fentanyl 2ml Vial) 100 mcg STK-MED ONCE .ROUTE ;  Start 

4/27/20 at 10:56;  Stop 4/27/20 at 10:57;  Status DC


Bupivacaine HCl/ Epinephrine Bitart (Sensorcain-Epi 0.5%-1:[id number] Mpf) 30 ml 

STK-MED ONCE .ROUTE  Last administered on 4/27/20at 12:01;  Start 4/27/20 at 

10:58;  Stop 4/27/20 at 10:58;  Status DC


Cellulose (Surgicel Hemostat 2x14) 1 each STK-MED ONCE .ROUTE ;  Start 4/27/20 

at 10:58;  Stop 4/27/20 at 10:59;  Status DC


Iohexol (Omnipaque 300 Mg/ml) 50 ml STK-MED ONCE .ROUTE ;  Start 4/27/20 at 10:

58;  Stop 4/27/20 at 10:59;  Status DC


Cellulose (Surgicel Hemostat 4x8) 1 each STK-MED ONCE .ROUTE ;  Start 4/27/20 at

10:58;  Stop 4/27/20 at 10:59;  Status DC


Bisacodyl (Dulcolax Supp) 10 mg STK-MED ONCE .ROUTE ;  Start 4/27/20 at 10:59;  

Stop 4/27/20 at 10:59;  Status DC


Heparin Sodium (Porcine) 1000 unit/Sodium Chloride 1,001 ml @  1,001 mls/hr 1X  

ONCE IRR ;  Start 4/27/20 at 12:00;  Stop 4/27/20 at 12:59;  Status DC


Propofol 20 ml @ As Directed STK-MED ONCE IV ;  Start 4/27/20 at 11:05;  Stop 

4/27/20 at 11:05;  Status DC


Sevoflurane (Ultane) 90 ml STK-MED ONCE IH ;  Start 4/27/20 at 11:05;  Stop 

4/27/20 at 11:05;  Status DC


Sevoflurane (Ultane) 60 ml STK-MED ONCE IH ;  Start 4/27/20 at 12:26;  Stop 

4/27/20 at 12:27;  Status DC


Propofol 20 ml @ As Directed STK-MED ONCE IV ;  Start 4/27/20 at 12:26;  Stop 

4/27/20 at 12:27;  Status DC


Phenylephrine HCl (PHENYLEPHRINE in 0.9% NACL PF) 1 mg STK-MED ONCE IV ;  Start 

4/27/20 at 12:34;  Stop 4/27/20 at 12:34;  Status DC


Heparin Sodium (Porcine) (Heparin Sodium) 5,000 unit Q12HR SQ  Last administered

on 5/6/20at 20:57;  Start 4/27/20 at 21:00;  Stop 5/7/20 at 09:59;  Status DC


Sodium Chloride (Normal Saline Flush) 3 ml QSHIFT  PRN IV AFTER MEDS AND BLOOD 

DRAWS;  Start 4/27/20 at 13:45


Naloxone HCl (Narcan) 0.4 mg PRN Q2MIN  PRN IV SEE INSTRUCTIONS Last 

administered on 6/6/20at 15:15;  Start 4/27/20 at 13:45


Sodium Chloride 1,000 ml @  25 mls/hr Q24H IV  Last administered on 5/26/20at 

13:37;  Start 4/27/20 at 13:37;  Stop 5/29/20 at 13:09;  Status DC


Naloxone HCl (Narcan) 0.4 mg PRN Q2MIN  PRN IV SEE INSTRUCTIONS;  Start 4/27/20 

at 14:30;  Status UNV


Sodium Chloride 1,000 ml @  25 mls/hr Q24H IV ;  Start 4/27/20 at 14:30;  Status

UNV


Hydromorphone HCl 30 ml @ 0 mls/hr CONT PRN  PRN IV PER PROTOCOL Last 

administered on 5/2/20at 16:08;  Start 4/27/20 at 14:30;  Stop 5/4/20 at 08:55; 

Status DC


Potassium Acetate 55 meq/Magnesium Sulfate 20 meq/ Calcium Gluconate 10 meq/ 

Multivitamins 10 ml/Chromium/ Copper/Manganese/ Seleni/Zn 0.5 ml/ Insulin Human 

Regular 35 unit/ Total Parenteral Nutrition/Amino Acids/Dextrose/ Fat Emulsion 

Intravenous 1,920 ml @  80 mls/hr TPN  CONT IV  Last administered on 4/27/20at 

22:01;  Start 4/27/20 at 22:00;  Stop 4/28/20 at 21:59;  Status DC


Bumetanide (Bumex) 2 mg [id number] IV  Last administered on 5/1/20at 13:50;  Start 

4/28/20 at 14:00;  Stop 5/2/20 at 14:10;  Status DC


Meropenem 1 gm/ Sodium Chloride 100 ml @  200 mls/hr Q8HRS IV  Last administered

on 5/22/20at 05:53;  Start 4/28/20 at 14:00;  Stop 5/22/20 at 09:31;  Status DC


Potassium Acetate 55 meq/Magnesium Sulfate 20 meq/ Calcium Gluconate 10 meq/ 

Multivitamins 10 ml/Chromium/ Copper/Manganese/ Seleni/Zn 0.5 ml/ Insulin Human 

Regular 35 unit/ Total Parenteral Nutrition/Amino Acids/Dextrose/ Fat Emulsion 

Intravenous 1,920 ml @  80 mls/hr TPN  CONT IV  Last administered on 4/28/20at 

22:02;  Start 4/28/20 at 22:00;  Stop 4/29/20 at 21:59;  Status DC


Hydromorphone HCl (Dilaudid Standard PCA) 12 mg STK-MED ONCE IV ;  Start 4/27/20

at 14:35;  Stop 4/28/20 at 13:53;  Status DC


Artificial Tears (Artificial Tears) 1 drop PRN Q15MIN  PRN OU DRY EYE Last 

administered on 6/23/20at 21:17;  Start 4/29/20 at 05:30


Hydromorphone HCl (Dilaudid Standard PCA) 12 mg STK-MED ONCE IV ;  Start 4/28/20

at 12:05;  Stop 4/29/20 at 09:15;  Status DC


Potassium Acetate 65 meq/Magnesium Sulfate 20 meq/ Calcium Gluconate 10 meq/ 

Multivitamins 10 ml/Chromium/ Copper/Manganese/ Seleni/Zn 0.5 ml/ Insulin Human 

Regular 30 unit/ Total Parenteral Nutrition/Amino Acids/Dextrose/ Fat Emulsion 

Intravenous 1,920 ml @  80 mls/hr TPN  CONT IV  Last administered on 4/29/20at 

22:22;  Start 4/29/20 at 22:00;  Stop 4/30/20 at 21:59;  Status DC


Cyclobenzaprine HCl (Flexeril) 10 mg PRN Q6HRS  PRN PO MUSCLE SPASMS;  Start 

4/30/20 at 10:45


Potassium Acetate 55 meq/Magnesium Sulfate 20 meq/ Calcium Gluconate 10 meq/ 

Multivitamins 10 ml/Chromium/ Copper/Manganese/ Seleni/Zn 0.5 ml/ Insulin Human 

Regular 30 unit/ Total Parenteral Nutrition/Amino Acids/Dextrose/ Fat Emulsion 

Intravenous 1,920 ml @  80 mls/hr TPN  CONT IV  Last administered on 5/1/20at 

01:00;  Start 4/30/20 at 22:00;  Stop 5/1/20 at 21:59;  Status DC


Magnesium Sulfate 50 ml @ 25 mls/hr 1X  ONCE IV  Last administered on 4/30/20at 

17:18;  Start 4/30/20 at 12:45;  Stop 4/30/20 at 14:44;  Status DC


Potassium Chloride/Water 100 ml @  100 mls/hr 1X  ONCE IV  Last administered on 

5/1/20at 11:27;  Start 5/1/20 at 12:00;  Stop 5/1/20 at 12:59;  Status DC


Hydromorphone HCl (Dilaudid Standard PCA) 12 mg STK-MED ONCE IV ;  Start 4/29/20

at 10:50;  Stop 5/1/20 at 11:02;  Status DC


Hydromorphone HCl (Dilaudid Standard PCA) 12 mg STK-MED ONCE IV ;  Start 4/30/20

at 13:47;  Stop 5/1/20 at 11:03;  Status DC


Potassium Acetate 30 meq/Magnesium Sulfate 20 meq/ Calcium Gluconate 10 meq/ 

Multivitamins 10 ml/Chromium/ Copper/Manganese/ Seleni/Zn 0.5 ml/ Insulin Human 

Regular 30 unit/ Potassium Chloride 30 meq/ Total Parenteral Nutrition/Amino 

Acids/Dextrose/ Fat Emulsion Intravenous 1,920 ml @  80 mls/hr TPN  CONT IV  

Last administered on 5/1/20at 22:34;  Start 5/1/20 at 22:00;  Stop 5/2/20 at 

21:59;  Status DC


Potassium Chloride/Water 100 ml @  100 mls/hr Q1H IV  Last administered on 

5/2/20at 13:05;  Start 5/2/20 at 07:00;  Stop 5/2/20 at 10:59;  Status DC


Magnesium Sulfate 50 ml @ 25 mls/hr 1X  ONCE IV  Last administered on 5/2/20at 

10:34;  Start 5/2/20 at 10:30;  Stop 5/2/20 at 12:29;  Status DC


Potassium Chloride 75 meq/ Magnesium Sulfate 20 meq/Calcium Gluconate 10 meq/ 

Multivitamins 10 ml/Chromium/ Copper/Manganese/ Seleni/Zn 0.5 ml/ Insulin Human 

Regular 30 unit/ Total Parenteral Nutrition/Amino Acids/Dextrose/ Fat Emulsion 

Intravenous 1,920 ml @  80 mls/hr TPN  CONT IV  Last administered on 5/2/20at 

21:51;  Start 5/2/20 at 22:00;  Stop 5/3/20 at 22:00;  Status DC


Potassium Chloride 75 meq/ Magnesium Sulfate 20 meq/Calcium Gluconate 10 meq/ 

Multivitamins 10 ml/Chromium/ Copper/Manganese/ Seleni/Zn 0.5 ml/ Insulin Human 

Regular 25 unit/ Total Parenteral Nutrition/Amino Acids/Dextrose/ Fat Emulsion 

Intravenous 1,920 ml @  80 mls/hr TPN  CONT IV  Last administered on 5/3/20at 

22:04;  Start 5/3/20 at 22:00;  Stop 5/4/20 at 21:59;  Status DC


Hydromorphone HCl (Dilaudid) 0.4 mg PRN Q4HRS  PRN IVP PAIN Last administered on

5/4/20at 10:57;  Start 5/4/20 at 09:00;  Stop 5/4/20 at 18:59;  Status DC


Micafungin Sodium 100 mg/Dextrose 100 ml @  100 mls/hr Q24H IV  Last 

administered on 5/26/20at 12:17;  Start 5/4/20 at 11:00;  Stop 5/27/20 at 09:59;

 Status DC


Daptomycin 485 mg/ Sodium Chloride 50 ml @  100 mls/hr Q24H IV  Last 

administered on 5/11/20at 13:10;  Start 5/4/20 at 11:00;  Stop 5/12/20 at 07:44;

 Status DC


Potassium Chloride 75 meq/ Magnesium Sulfate 15 meq/Calcium Gluconate 8 meq/ 

Multivitamins 10 ml/Chromium/ Copper/Manganese/ Seleni/Zn 0.5 ml/ Insulin Human 

Regular 25 unit/ Total Parenteral Nutrition/Amino Acids/Dextrose/ Fat Emulsion 

Intravenous 1,920 ml @  80 mls/hr TPN  CONT IV  Last administered on 5/4/20at 

23:08;  Start 5/4/20 at 22:00;  Stop 5/5/20 at 21:59;  Status DC


Haloperidol Lactate (Haldol Inj) 3 mg 1X  ONCE IVP  Last administered on 

5/4/20at 14:37;  Start 5/4/20 at 14:30;  Stop 5/4/20 at 14:31;  Status DC


Hydromorphone HCl (Dilaudid) 1 mg PRN Q4HRS  PRN IVP PAIN Last administered on 

5/18/20at 06:25;  Start 5/4/20 at 19:00;  Stop 5/18/20 at 17:10;  Status DC


Potassium Chloride 75 meq/ Magnesium Sulfate 15 meq/Calcium Gluconate 8 meq/ 

Multivitamins 10 ml/Chromium/ Copper/Manganese/ Seleni/Zn 0.5 ml/ Insulin Human 

Regular 20 unit/ Total Parenteral Nutrition/Amino Acids/Dextrose/ Fat Emulsion 

Intravenous 1,920 ml @  80 mls/hr TPN  CONT IV  Last administered on 5/5/20at 

22:10;  Start 5/5/20 at 22:00;  Stop 5/6/20 at 21:59;  Status DC


Lidocaine HCl (Buffered Lidocaine 1%) 3 ml STK-MED ONCE .ROUTE ;  Start 5/6/20 

at 11:31;  Stop 5/6/20 at 11:31;  Status DC


Lidocaine HCl (Buffered Lidocaine 1%) 3 ml STK-MED ONCE .ROUTE ;  Start 5/6/20 

at 12:28;  Stop 5/6/20 at 12:29;  Status DC


Lidocaine HCl (Buffered Lidocaine 1%) 6 ml 1X  ONCE INJ  Last administered on 

5/6/20at 12:53;  Start 5/6/20 at 12:45;  Stop 5/6/20 at 12:46;  Status DC


Potassium Chloride 75 meq/ Magnesium Sulfate 15 meq/Calcium Gluconate 8 meq/ 

Multivitamins 10 ml/Chromium/ Copper/Manganese/ Seleni/Zn 0.5 ml/ Insulin Human 

Regular 20 unit/ Total Parenteral Nutrition/Amino Acids/Dextrose/ Fat Emulsion 

Intravenous 1,920 ml @  80 mls/hr TPN  CONT IV  Last administered on 5/6/20at 

22:00;  Start 5/6/20 at 22:00;  Stop 5/7/20 at 21:59;  Status DC


Potassium Chloride 75 meq/ Magnesium Sulfate 15 meq/Calcium Gluconate 8 meq/ 

Multivitamins 10 ml/Chromium/ Copper/Manganese/ Seleni/Zn 0.5 ml/ Insulin Human 

Regular 15 unit/ Total Parenteral Nutrition/Amino Acids/Dextrose/ Fat Emulsion 

Intravenous 1,920 ml @  80 mls/hr TPN  CONT IV  Last administered on 5/7/20at 

22:28;  Start 5/7/20 at 22:00;  Stop 5/8/20 at 21:59;  Status DC


Vecuronium Bromide (Norcuron Bolus) 6 mg PRN Q6HRS  PRN IV VENT ASYNCHRONY;  

Start 5/7/20 at 19:15;  Stop 5/7/20 at 19:35;  Status DC


Bumetanide (Bumex) 2 mg 1X  ONCE IV  Last administered on 5/7/20at 22:09;  Start

5/7/20 at 19:45;  Stop 5/7/20 at 19:46;  Status DC


Lidocaine HCl (Buffered Lidocaine 1%) 3 ml STK-MED ONCE .ROUTE ;  Start 5/8/20 

at 07:59;  Stop 5/8/20 at 07:59;  Status DC


Midazolam HCl (Versed) 5 mg STK-MED ONCE .ROUTE ;  Start 5/8/20 at 08:36;  Stop 

5/8/20 at 08:36;  Status DC


Fentanyl Citrate (Fentanyl 5ml Vial) 250 mcg STK-MED ONCE .ROUTE ;  Start 5/8/20

at 08:36;  Stop 5/8/20 at 08:37;  Status DC


Lidocaine HCl (Buffered Lidocaine 1%) 3 ml 1X  ONCE IJ  Last administered on 

5/8/20at 09:30;  Start 5/8/20 at 09:15;  Stop 5/8/20 at 09:16;  Status DC


Midazolam HCl (Versed) 5 mg 1X  ONCE IV  Last administered on 5/8/20at 09:30;  

Start 5/8/20 at 09:15;  Stop 5/8/20 at 09:16;  Status DC


Fentanyl Citrate (Fentanyl 5ml Vial) 250 mcg 1X  ONCE IV  Last administered on 

5/8/20at 09:30;  Start 5/8/20 at 09:15;  Stop 5/8/20 at 09:16;  Status DC


Bumetanide (Bumex) 2 mg DAILY IV  Last administered on 5/18/20at 08:07;  Start 

5/8/20 at 10:00;  Stop 5/18/20 at 17:15;  Status DC


Potassium Chloride 75 meq/ Magnesium Sulfate 15 meq/ Multivitamins 10 

ml/Chromium/ Copper/Manganese/ Seleni/Zn 0.5 ml/ Insulin Human Regular 15 unit/ 

Total Parenteral Nutrition/Amino Acids/Dextrose/ Fat Emulsion Intravenous 1,920 

ml @  80 mls/hr TPN  CONT IV  Last administered on 5/8/20at 21:59;  Start 5/8/20

at 22:00;  Stop 5/9/20 at 21:59;  Status DC


Metoclopramide HCl (Reglan Vial) 10 mg PRN Q3HRS  PRN IVP NAUSEA/VOMITING-3rd c

hoice Last administered on 5/14/20at 04:25;  Start 5/9/20 at 16:45


Potassium Chloride 75 meq/ Magnesium Sulfate 15 meq/ Multivitamins 10 

ml/Chromium/ Copper/Manganese/ Seleni/Zn 0.5 ml/ Insulin Human Regular 15 unit/ 

Total Parenteral Nutrition/Amino Acids/Dextrose/ Fat Emulsion Intravenous 1,920 

ml @  80 mls/hr TPN  CONT IV  Last administered on 5/9/20at 22:41;  Start 5/9/20

at 22:00;  Stop 5/10/20 at 21:59;  Status DC


Magnesium Sulfate 50 ml @ 25 mls/hr 1X  ONCE IV  Last administered on 5/10/20at 

10:44;  Start 5/10/20 at 09:00;  Stop 5/10/20 at 10:59;  Status DC


Potassium Chloride/Water 100 ml @  100 mls/hr 1X  ONCE IV  Last administered on 

5/10/20at 09:37;  Start 5/10/20 at 09:00;  Stop 5/10/20 at 09:59;  Status DC


Duloxetine HCl (Cymbalta) 30 mg DAILY PO  Last administered on 5/11/20at 09:48; 

Start 5/10/20 at 14:00;  Stop 5/13/20 at 10:25;  Status DC


Potassium Chloride 80 meq/ Magnesium Sulfate 20 meq/ Multivitamins 10 

ml/Chromium/ Copper/Manganese/ Seleni/Zn 0.5 ml/ Insulin Human Regular 15 unit/ 

Total Parenteral Nutrition/Amino Acids/Dextrose/ Fat Emulsion Intravenous 1,920 

ml @  80 mls/hr TPN  CONT IV  Last administered on 5/10/20at 21:42;  Start 

5/10/20 at 22:00;  Stop 5/11/20 at 21:59;  Status DC


Potassium Chloride 80 meq/ Magnesium Sulfate 20 meq/ Multivitamins 10 

ml/Chromium/ Copper/Manganese/ Seleni/Zn 0.5 ml/ Insulin Human Regular 15 unit/ 

Total Parenteral Nutrition/Amino Acids/Dextrose/ Fat Emulsion Intravenous 1,920 

ml @  80 mls/hr TPN  CONT IV  Last administered on 5/11/20at 22:20;  Start 

5/11/20 at 22:00;  Stop 5/12/20 at 21:59;  Status DC


Lidocaine HCl (Buffered Lidocaine 1%) 3 ml STK-MED ONCE .ROUTE ;  Start 5/12/20 

at 09:54;  Stop 5/12/20 at 09:55;  Status DC


Hydromorphone HCl (Dilaudid Standard PCA) 12 mg STK-MED ONCE IV ;  Start 5/1/20 

at 15:50;  Stop 5/12/20 at 11:24;  Status DC


Potassium Chloride 80 meq/ Magnesium Sulfate 20 meq/ Multivitamins 10 

ml/Chromium/ Copper/Manganese/ Seleni/Zn 0.5 ml/ Insulin Human Regular 15 unit/ 

Total Parenteral Nutrition/Amino Acids/Dextrose/ Fat Emulsion Intravenous 1,920 

ml @  80 mls/hr TPN  CONT IV  Last administered on 5/12/20at 21:40;  Start 

5/12/20 at 22:00;  Stop 5/13/20 at 21:59;  Status DC


Lidocaine HCl (Buffered Lidocaine 1%) 6 ml 1X  ONCE INJ  Last administered on 

5/12/20at 14:15;  Start 5/12/20 at 14:15;  Stop 5/12/20 at 14:16;  Status DC


Potassium Chloride 80 meq/ Magnesium Sulfate 20 meq/ Multivitamins 10 

ml/Chromium/ Copper/Manganese/ Seleni/Zn 1 ml/ Insulin Human Regular 15 unit/ 

Total Parenteral Nutrition/Amino Acids/Dextrose/ Fat Emulsion Intravenous 1,920 

ml @  80 mls/hr TPN  CONT IV  Last administered on 5/13/20at 22:04;  Start 

5/13/20 at 22:00;  Stop 5/14/20 at 21:59;  Status DC


Potassium Chloride/Water 100 ml @  100 mls/hr 1X  ONCE IV  Last administered on 

5/14/20at 11:34;  Start 5/14/20 at 11:00;  Stop 5/14/20 at 11:59;  Status DC


Potassium Chloride 90 meq/ Magnesium Sulfate 20 meq/ Multivitamins 10 

ml/Chromium/ Copper/Manganese/ Seleni/Zn 1 ml/ Insulin Human Regular 15 unit/ 

Total Parenteral Nutrition/Amino Acids/Dextrose/ Fat Emulsion Intravenous 1,920 

ml @  80 mls/hr TPN  CONT IV  Last administered on 5/14/20at 22:57;  Start 

5/14/20 at 22:00;  Stop 5/15/20 at 21:59;  Status DC


Potassium Chloride 90 meq/ Magnesium Sulfate 20 meq/ Multivitamins 10 

ml/Chromium/ Copper/Manganese/ Seleni/Zn 1 ml/ Insulin Human Regular 15 unit/ 

Total Parenteral Nutrition/Amino Acids/Dextrose/ Fat Emulsion Intravenous 1,920 

ml @  80 mls/hr TPN  CONT IV  Last administered on 5/15/20at 22:48;  Start 

5/15/20 at 22:00;  Stop 5/16/20 at 21:59;  Status DC


Potassium Chloride 90 meq/ Magnesium Sulfate 20 meq/ Multivitamins 10 

ml/Chromium/ Copper/Manganese/ Seleni/Zn 1 ml/ Insulin Human Regular 15 unit/ 

Total Parenteral Nutrition/Amino Acids/Dextrose/ Fat Emulsion Intravenous 1,890 

ml @  78.75 mls/ hr TPN  CONT IV  Last administered on 5/16/20at 22:15;  Start 

5/16/20 at 22:00;  Stop 5/17/20 at 21:59;  Status DC


Linezolid/Dextrose 300 ml @  300 mls/hr Q12HR IV  Last administered on 5/19/20at

21:08;  Start 5/17/20 at 09:00;  Stop 5/20/20 at 08:11;  Status DC


Daptomycin 450 mg/ Sodium Chloride 50 ml @  100 mls/hr Q24H IV  Last 

administered on 5/20/20at 09:25;  Start 5/17/20 at 09:00;  Stop 5/21/20 at 0

8:30;  Status DC


Potassium Chloride 90 meq/ Magnesium Sulfate 20 meq/ Multivitamins 10 

ml/Chromium/ Copper/Manganese/ Seleni/Zn 1 ml/ Insulin Human Regular 15 unit/ 

Total Parenteral Nutrition/Amino Acids/Dextrose/ Fat Emulsion Intravenous 1,890 

ml @  78.75 mls/ hr TPN  CONT IV  Last administered on 5/17/20at 21:34;  Start 

5/17/20 at 22:00;  Stop 5/18/20 at 21:59;  Status DC


Lorazepam (Ativan Inj) 2 mg STK-MED ONCE .ROUTE ;  Start 5/17/20 at 14:58;  Stop

5/17/20 at 14:58;  Status DC


Metoprolol Tartrate (Lopressor Vial) 5 mg 1X  ONCE IVP  Last administered on 

5/17/20at 15:31;  Start 5/17/20 at 15:15;  Stop 5/17/20 at 15:16;  Status DC


Lorazepam (Ativan Inj) 2 mg 1X  ONCE IVP  Last administered on 5/17/20at 15:30; 

Start 5/17/20 at 15:15;  Stop 5/17/20 at 15:16;  Status DC


Enoxaparin Sodium (Lovenox 40mg Syringe) 40 mg Q24H SQ  Last administered on 

6/5/20at 17:44;  Start 5/17/20 at 17:00;  Stop 6/7/20 at 06:50;  Status DC


Lorazepam (Ativan Inj) 1 mg PRN Q4HRS  PRN IVP ANXIETY / AGITATION MILD-MOD Last

administered on 5/31/20at 15:55;  Start 5/17/20 at 19:15;  Stop 6/2/20 at 11:45;

 Status DC


Lorazepam (Ativan Inj) 2 mg PRN Q4HRS  PRN IVP ANXIETY / AGITATION SEVERE Last 

administered on 6/1/20at 07:55;  Start 5/17/20 at 19:15;  Stop 6/2/20 at 11:45; 

Status DC


Fentanyl Citrate (Fentanyl 2ml Vial) 50 mcg PRN Q4HRS  PRN IVP SEVERE PAIN Last 

administered on 6/13/20at 05:15;  Start 5/18/20 at 13:15;  Stop 6/14/20 at 

09:29;  Status DC


Fentanyl Citrate (Fentanyl 2ml Vial) 25 mcg PRN Q4HRS  PRN IVP MODERATE PAIN 

Last administered on 6/13/20at 00:27;  Start 5/18/20 at 13:15;  Stop 6/14/20 at 

09:30;  Status DC


Potassium Chloride 90 meq/ Magnesium Sulfate 20 meq/ Multivitamins 10 

ml/Chromium/ Copper/Manganese/ Seleni/Zn 1 ml/ Insulin Human Regular 15 unit/ 

Total Parenteral Nutrition/Amino Acids/Dextrose/ Fat Emulsion Intravenous 1,890 

ml @  78.75 mls/ hr TPN  CONT IV  Last administered on 5/18/20at 22:18;  Start 

5/18/20 at 22:00;  Stop 5/19/20 at 21:59;  Status DC


Furosemide (Lasix) 40 mg 1X  ONCE IVP  Last administered on 5/18/20at 21:51;  

Start 5/18/20 at 21:45;  Stop 5/18/20 at 21:48;  Status DC


Albumin Human 100 ml @  100 mls/hr 1X PRN  PRN IV SEE COMMENTS;  Start 5/19/20 

at 01:30


Furosemide (Lasix) 40 mg [id number] IVP  Last administered on 6/3/20at 08:04;  Start 

5/19/20 at 14:00;  Stop 6/3/20 at 13:07;  Status DC


Potassium Chloride 90 meq/ Magnesium Sulfate 20 meq/ Multivitamins 10 

ml/Chromium/ Copper/Manganese/ Seleni/Zn 1 ml/ Insulin Human Regular 15 unit/ 

Total Parenteral Nutrition/Amino Acids/Dextrose/ Fat Emulsion Intravenous 1,800 

ml @  75 mls/hr TPN  CONT IV  Last administered on 5/19/20at 22:31;  Start 

5/19/20 at 22:00;  Stop 5/20/20 at 21:59;  Status DC


Potassium Chloride 90 meq/ Magnesium Sulfate 20 meq/ Multivitamins 10 

ml/Chromium/ Copper/Manganese/ Seleni/Zn 1 ml/ Insulin Human Regular 15 unit/ 

Total Parenteral Nutrition/Amino Acids/Dextrose/ Fat Emulsion Intravenous 1,800 

ml @  75 mls/hr TPN  CONT IV  Last administered on 5/20/20at 22:28;  Start 

5/20/20 at 22:00;  Stop 5/21/20 at 21:59;  Status DC


Potassium Chloride 110 meq/ Magnesium Sulfate 20 meq/ Multivitamins 10 

ml/Chromium/ Copper/Manganese/ Seleni/Zn 1 ml/ Insulin Human Regular 15 unit/ 

Total Parenteral Nutrition/Amino Acids/Dextrose/ Fat Emulsion Intravenous 1,800 

ml @  75 mls/hr TPN  CONT IV  Last administered on 5/21/20at 22:01;  Start 

5/21/20 at 22:00;  Stop 5/22/20 at 21:59;  Status DC


Saliva Substitute (Biotene Moisturizing Mouth) 2 spray PRN Q15MIN  PRN PO DRY 

MOUTH;  Start 5/21/20 at 11:00


Potassium Chloride 110 meq/ Magnesium Sulfate 20 meq/ Multivitamins 10 

ml/Chromium/ Copper/Manganese/ Seleni/Zn 1 ml/ Insulin Human Regular 15 unit/ 

Total Parenteral Nutrition/Amino Acids/Dextrose/ Fat Emulsion Intravenous 1,800 

ml @  75 mls/hr TPN  CONT IV  Last administered on 5/22/20at 22:21;  Start 

5/22/20 at 22:00;  Stop 5/23/20 at 21:59;  Status DC


Potassium Chloride 110 meq/ Magnesium Sulfate 20 meq/ Multivitamins 10 

ml/Chromium/ Copper/Manganese/ Seleni/Zn 1 ml/ Insulin Human Regular 15 unit/ 

Total Parenteral Nutrition/Amino Acids/Dextrose/ Fat Emulsion Intravenous 1,800 

ml @  75 mls/hr TPN  CONT IV  Last administered on 5/23/20at 22:04;  Start 

5/23/20 at 22:00;  Stop 5/24/20 at 21:59;  Status DC


Potassium Chloride 110 meq/ Magnesium Sulfate 20 meq/ Multivitamins 10 

ml/Chromium/ Copper/Manganese/ Seleni/Zn 1 ml/ Insulin Human Regular 15 unit/ 

Total Parenteral Nutrition/Amino Acids/Dextrose/ Fat Emulsion Intravenous 1,800 

ml @  75 mls/hr TPN  CONT IV  Last administered on 5/24/20at 22:48;  Start 

5/24/20 at 22:00;  Stop 5/25/20 at 21:59;  Status DC


Potassium Chloride 70 meq/ Magnesium Sulfate 20 meq/ Multivitamins 10 

ml/Chromium/ Copper/Manganese/ Seleni/Zn 1 ml/ Insulin Human Regular 15 unit/ 

Total Parenteral Nutrition/Amino Acids/Dextrose/ Fat Emulsion Intravenous 1,800 

ml @  75 mls/hr TPN  CONT IV  Last administered on 5/25/20at 21:39;  Start 

5/25/20 at 22:00;  Stop 5/26/20 at 21:59;  Status DC


Meropenem 500 mg/ Sodium Chloride 50 ml @  100 mls/hr Q6HRS IV  Last 

administered on 5/27/20at 06:02;  Start 5/25/20 at 18:00;  Stop 5/27/20 at 

09:59;  Status DC


Barium Sulfate (Varibar Thin Liquid Apple) 148 gm 1X  ONCE PO ;  Start 5/26/20 

at 11:45;  Stop 5/26/20 at 11:49;  Status DC


Potassium Chloride 70 meq/ Magnesium Sulfate 20 meq/ Multivitamins 10 

ml/Chromium/ Copper/Manganese/ Seleni/Zn 1 ml/ Insulin Human Regular 15 unit/ 

Total Parenteral Nutrition/Amino Acids/Dextrose/ Fat Emulsion Intravenous 1,800 

ml @  75 mls/hr TPN  CONT IV  Last administered on 5/26/20at 22:27;  Start 

5/26/20 at 22:00;  Stop 5/27/20 at 21:59;  Status DC


Piperacillin Sod/ Tazobactam Sod 3.375 gm/Sodium Chloride 50 ml @  100 mls/hr 

Q6HRS IV  Last administered on 6/4/20at 06:10;  Start 5/27/20 at 12:00;  Stop 

6/4/20 at 07:26;  Status DC


Potassium Chloride 70 meq/ Magnesium Sulfate 20 meq/ Multivitamins 10 

ml/Chromium/ Copper/Manganese/ Seleni/Zn 1 ml/ Insulin Human Regular 15 unit/ 

Total Parenteral Nutrition/Amino Acids/Dextrose/ Fat Emulsion Intravenous 1,800 

ml @  75 mls/hr TPN  CONT IV  Last administered on 5/27/20at 22:03;  Start 

5/27/20 at 22:00;  Stop 5/28/20 at 21:59;  Status DC


Potassium Chloride 70 meq/ Magnesium Sulfate 20 meq/ Multivitamins 10 

ml/Chromium/ Copper/Manganese/ Seleni/Zn 1 ml/ Insulin Human Regular 15 unit/ 

Total Parenteral Nutrition/Amino Acids/Dextrose/ Fat Emulsion Intravenous 1,800 

ml @  75 mls/hr TPN  CONT IV  Last administered on 5/28/20at 22:33;  Start 

5/28/20 at 22:00;  Stop 5/29/20 at 21:59;  Status DC


Potassium Chloride 70 meq/ Magnesium Sulfate 20 meq/ Multivitamins 10 

ml/Chromium/ Copper/Manganese/ Seleni/Zn 1 ml/ Insulin Human Regular 15 unit/ 

Total Parenteral Nutrition/Amino Acids/Dextrose/ Fat Emulsion Intravenous 1,800 

ml @  75 mls/hr TPN  CONT IV  Last administered on 5/29/20at 23:13;  Start 

5/29/20 at 22:00;  Stop 5/30/20 at 21:59;  Status DC


Potassium Chloride 80 meq/ Magnesium Sulfate 20 meq/ Multivitamins 10 ml

/Chromium/ Copper/Manganese/ Seleni/Zn 1 ml/ Insulin Human Regular 15 unit/ 

Total Parenteral Nutrition/Amino Acids/Dextrose/ Fat Emulsion Intravenous 1,800 

ml @  75 mls/hr TPN  CONT IV  Last administered on 5/30/20at 22:30;  Start 

5/30/20 at 22:00;  Stop 5/31/20 at 21:59;  Status DC


Potassium Chloride 80 meq/ Magnesium Sulfate 20 meq/ Multivitamins 10 

ml/Chromium/ Copper/Manganese/ Seleni/Zn 1 ml/ Insulin Human Regular 15 unit/ 

Total Parenteral Nutrition/Amino Acids/Dextrose/ Fat Emulsion Intravenous 1,800 

ml @  75 mls/hr TPN  CONT IV  Last administered on 5/31/20at 21:54;  Start 

5/31/20 at 22:00;  Stop 6/1/20 at 21:59;  Status DC


Potassium Chloride/Water 100 ml @  100 mls/hr 1X  ONCE IV  Last administered on 

6/1/20at 10:15;  Start 6/1/20 at 10:00;  Stop 6/1/20 at 10:59;  Status DC


Potassium Chloride 90 meq/ Magnesium Sulfate 20 meq/ Multivitamins 10 

ml/Chromium/ Copper/Manganese/ Seleni/Zn 1 ml/ Insulin Human Regular 20 unit/ 

Total Parenteral Nutrition/Amino Acids/Dextrose/ Fat Emulsion Intravenous 1,800 

ml @  75 mls/hr TPN  CONT IV  Last administered on 6/1/20at 22:28;  Start 6/1/20

at 22:00;  Stop 6/2/20 at 21:59;  Status DC


Potassium Chloride 90 meq/ Magnesium Sulfate 20 meq/ Multivitamins 10 

ml/Chromium/ Copper/Manganese/ Seleni/Zn 1 ml/ Insulin Human Regular 20 unit/ 

Total Parenteral Nutrition/Amino Acids/Dextrose/ Fat Emulsion Intravenous 1,800 

ml @  75 mls/hr TPN  CONT IV  Last administered on 6/2/20at 22:08;  Start 6/2/20

at 22:00;  Stop 6/3/20 at 21:59;  Status DC


Lorazepam (Ativan Inj) 0.25 mg PRN Q4HRS  PRN IVP ANXIETY / AGITATION Last 

administered on 6/27/20at 13:37;  Start 6/3/20 at 07:30


Potassium Chloride 90 meq/ Magnesium Sulfate 20 meq/ Multivitamins 10 

ml/Chromium/ Copper/Manganese/ Seleni/Zn 1 ml/ Insulin Human Regular 20 unit/ 

Total Parenteral Nutrition/Amino Acids/Dextrose/ Fat Emulsion Intravenous 1,800 

ml @  75 mls/hr TPN  CONT IV  Last administered on 6/3/20at 23:13;  Start 6/3/20

at 22:00;  Stop 6/4/20 at 21:59;  Status DC


Furosemide (Lasix) 40 mg DAILY IVP  Last administered on 6/5/20at 11:14;  Start 

6/3/20 at 13:30;  Stop 6/7/20 at 09:12;  Status DC


Fluoxetine HCl (PROzac) 20 mg QHS PEG  Last administered on 6/27/20at 21:52;  

Start 6/4/20 at 21:00


Fentanyl (Duragesic 50mcg/ Hr Patch) 1 patch Q72H TD  Last administered on 

6/4/20at 21:22;  Start 6/4/20 at 21:00;  Stop 6/13/20 at 12:00;  Status DC


Potassium Chloride 40 meq/ Potassium Acetate 60 meq/Magnesium Sulfate 10 meq/ 

Multivitamins 10 ml/Chromium/ Copper/Manganese/ Seleni/Zn 1 ml/ Insulin Human 

Regular 20 unit/ Total Parenteral Nutrition/Amino Acids/Dextrose/ Fat Emulsion 

Intravenous 1,800 ml @  75 mls/hr TPN  CONT IV  Last administered on 6/5/20at 

00:03;  Start 6/4/20 at 22:00;  Stop 6/5/20 at 21:59;  Status DC


Potassium Acetate 80 meq/Magnesium Sulfate 5 meq/ Multivitamins 10 ml/Chromium/ 

Copper/Manganese/ Seleni/Zn 1 ml/ Insulin Human Regular 20 unit/ Total 

Parenteral Nutrition/Amino Acids/Dextrose/ Fat Emulsion Intravenous 1,920 ml @  

80 mls/hr TPN  CONT IV  Last administered on 6/5/20at 21:59;  Start 6/5/20 at 

22:00;  Stop 6/6/20 at 21:59;  Status DC


Potassium Acetate 60 meq/Magnesium Sulfate 5 meq/ Multivitamins 10 ml/Chromium/ 

Copper/Manganese/ Seleni/Zn 1 ml/ Insulin Human Regular 30 unit/ Total 

Parenteral Nutrition/Amino Acids/Dextrose/ Fat Emulsion Intravenous 1,920 ml @  

80 mls/hr TPN  CONT IV  Last administered on 6/6/20at 21:54;  Start 6/6/20 at 

22:00;  Stop 6/7/20 at 21:59;  Status DC


Norepinephrine Bitartrate 8 mg/ Dextrose 258 ml @  13.332 mls/ hr CONT  PRN IV 

PER PROTOCOL Last administered on 6/30/20at 15:41;  Start 6/7/20 at 06:30


Albumin Human 500 ml @  125 mls/hr 1X  ONCE IV  Last administered on 6/7/20at 

08:10;  Start 6/7/20 at 08:15;  Stop 6/7/20 at 12:14;  Status DC


Potassium Acetate 40 meq/Magnesium Sulfate 5 meq/ Multivitamins 10 ml/Chromium/ 

Copper/Manganese/ Seleni/Zn 1 ml/ Insulin Human Regular 30 unit/ Total 

Parenteral Nutrition/Amino Acids/Dextrose/ Fat Emulsion Intravenous 1,920 ml @  

80 mls/hr TPN  CONT IV  Last administered on 6/7/20at 22:23;  Start 6/7/20 at 

22:00;  Stop 6/8/20 at 21:59;  Status DC


Meropenem 1 gm/ Sodium Chloride 100 ml @  200 mls/hr Q8HRS IV ;  Start 6/7/20 at

14:00;  Status Cancel


Meropenem 1 gm/ Sodium Chloride 100 ml @  200 mls/hr Q8HRS IV  Last administered

on 6/7/20at 11:04;  Start 6/7/20 at 10:00;  Stop 6/7/20 at 13:00;  Status DC


Meropenem 1 gm/ Sodium Chloride 100 ml @  200 mls/hr Q12HR IV  Last administered

on 6/25/20at 08:27;  Start 6/7/20 at 21:00;  Stop 6/25/20 at 08:56;  Status DC


Sodium Chloride 1,000 ml @  1,000 mls/hr 1X  ONCE IV  Last administered on 

6/7/20at 11:06;  Start 6/7/20 at 10:45;  Stop 6/7/20 at 11:44;  Status DC


Micafungin Sodium 100 mg/Dextrose 100 ml @  100 mls/hr Q24H IV  Last administe

red on 6/24/20at 12:34;  Start 6/7/20 at 11:00;  Stop 6/25/20 at 08:56;  Status 

DC


Daptomycin 410 mg/ Sodium Chloride 50 ml @  100 mls/hr Q24H IV  Last 

administered on 6/9/20at 13:33;  Start 6/7/20 at 14:00;  Stop 6/10/20 at 08:30; 

Status DC


Midazolam HCl (Versed) 2 mg STK-MED ONCE .ROUTE ;  Start 6/7/20 at 14:47;  Stop 

6/7/20 at 14:48;  Status DC


Fentanyl Citrate (Fentanyl 2ml Vial) 100 mcg STK-MED ONCE .ROUTE ;  Start 6/7/20

at 14:47;  Stop 6/7/20 at 14:48;  Status DC


Flumazenil (Romazicon) 0.5 mg STK-MED ONCE IV ;  Start 6/7/20 at 14:48;  Stop 

6/7/20 at 14:48;  Status DC


Naloxone HCl (Narcan) 0.4 mg STK-MED ONCE .ROUTE ;  Start 6/7/20 at 14:48;  Stop

6/7/20 at 14:48;  Status DC


Lidocaine HCl (Lidocaine 1% 20ml Vial) 20 ml STK-MED ONCE .ROUTE ;  Start 6/7/20

at 14:48;  Stop 6/7/20 at 14:48;  Status DC


Midazolam HCl (Versed) 2 mg 1X  ONCE IV  Last administered on 6/7/20at 15:28;  S

tart 6/7/20 at 15:00;  Stop 6/7/20 at 15:01;  Status DC


Fentanyl Citrate (Fentanyl 2ml Vial) 100 mcg 1X  ONCE IV  Last administered on 

6/7/20at 15:28;  Start 6/7/20 at 15:00;  Stop 6/7/20 at 15:01;  Status DC


Lidocaine HCl (Lidocaine 1% 20ml Vial) 20 ml 1X  ONCE INJ  Last administered on 

6/7/20at 15:30;  Start 6/7/20 at 15:00;  Stop 6/7/20 at 15:01;  Status DC


Sodium Chloride 1,000 ml @  100 mls/hr Q10H IV  Last administered on 6/16/20at 

07:30;  Start 6/7/20 at 20:00;  Stop 6/16/20 at 11:26;  Status DC


Sodium Bicarbonate (Sodium Bicarb Adult 8.4% Syr) 50 meq 1X  ONCE IV  Last 

administered on 6/7/20at 21:47;  Start 6/7/20 at 22:00;  Stop 6/7/20 at 22:01;  

Status DC


Potassium Acetate 40 meq/Magnesium Sulfate 5 meq/ Multivitamins 10 ml/Chromium/ 

Copper/Manganese/ Seleni/Zn 1 ml/ Insulin Human Regular 30 unit/ Total 

Parenteral Nutrition/Amino Acids/Dextrose/ Fat Emulsion Intravenous 1,920 ml @  

80 mls/hr TPN  CONT IV  Last administered on 6/8/20at 22:28;  Start 6/8/20 at 

22:00;  Stop 6/9/20 at 21:59;  Status DC


Sodium Chloride 500 ml @  500 mls/hr 1X  ONCE IV  Last administered on 6/9/20at 

06:39;  Start 6/9/20 at 06:45;  Stop 6/9/20 at 07:44;  Status DC


Potassium Acetate 40 meq/Magnesium Sulfate 5 meq/ Multivitamins 10 ml/Chromium/ 

Copper/Manganese/ Seleni/Zn 1 ml/ Insulin Human Regular 30 unit/ Total 

Parenteral Nutrition/Amino Acids/Dextrose/ Fat Emulsion Intravenous 1,920 ml @  

80 mls/hr TPN  CONT IV  Last administered on 6/9/20at 22:03;  Start 6/9/20 at 

22:00;  Stop 6/10/20 at 21:59;  Status DC


Metoprolol Tartrate (Lopressor Vial) 5 mg PRN Q6HRS  PRN IVP HYPERTENSION Last 

administered on 6/18/20at 10:14;  Start 6/10/20 at 09:00


Potassium Acetate 40 meq/Magnesium Sulfate 5 meq/ Multivitamins 10 ml/Chromium/ 

Copper/Manganese/ Seleni/Zn 1 ml/ Insulin Human Regular 30 unit/ Total 

Parenteral Nutrition/Amino Acids/Dextrose/ Fat Emulsion Intravenous 1,920 ml @  

80 mls/hr TPN  CONT IV  Last administered on 6/10/20at 21:26;  Start 6/10/20 at 

22:00;  Stop 6/11/20 at 21:59;  Status DC


Potassium Acetate 40 meq/Magnesium Sulfate 5 meq/ Multivitamins 10 ml/Chromium/ 

Copper/Manganese/ Seleni/Zn 1 ml/ Insulin Human Regular 30 unit/ Total 

Parenteral Nutrition/Amino Acids/Dextrose/ Fat Emulsion Intravenous 1,920 ml @  

80 mls/hr TPN  CONT IV  Last administered on 6/11/20at 23:23;  Start 6/11/20 at 

22:00;  Stop 6/12/20 at 21:59;  Status DC


Potassium Acetate 40 meq/Magnesium Sulfate 5 meq/ Multivitamins 10 ml/Chromium/ 

Copper/Manganese/ Seleni/Zn 1 ml/ Insulin Human Regular 30 unit/ Total 

Parenteral Nutrition/Amino Acids/Dextrose/ Fat Emulsion Intravenous 1,920 ml @  

80 mls/hr TPN  CONT IV  Last administered on 6/12/20at 21:35;  Start 6/12/20 at 

22:00;  Stop 6/13/20 at 21:59;  Status DC


Furosemide (Lasix) 20 mg 1X  ONCE IVP  Last administered on 6/13/20at 06:26;  

Start 6/13/20 at 06:15;  Stop 6/13/20 at 06:16;  Status DC


Methylprednisolone Sodium Succinate (SOLU-Medrol 125MG VIAL) 125 mg 1X  ONCE IV 

Last administered on 6/13/20at 06:26;  Start 6/13/20 at 06:15;  Stop 6/13/20 at 

06:16;  Status DC


Albuterol/ Ipratropium (Duoneb) 3 ml Q4HRS NEB  Last administered on 7/1/20at 

11:49;  Start 6/13/20 at 08:00


Fentanyl Citrate 30 ml @ 0 mls/hr CONT  PRN IV SEE PROTOCOL Last administered on

7/1/20at 09:25;  Start 6/13/20 at 06:00


Propofol 100 ml @ 0 mls/hr CONT  PRN IV SEE PROTOCOL Last administered on 

6/20/20at 23:50;  Start 6/13/20 at 06:00


Fentanyl Citrate (Fentanyl 2ml Vial) 25 mcg PRN Q1HR  PRN IV SEE COMMENTS;  

Start 6/13/20 at 06:00


Fentanyl Citrate (Fentanyl 2ml Vial) 50 mcg PRN Q1HR  PRN IV SEE COMMENTS;  

Start 6/13/20 at 06:00


Chlorhexidine Gluconate (Peridex) 15 ml BID MM ;  Start 6/13/20 at 09:00;  Stop 

6/13/20 at 07:58;  Status DC


Potassium Acetate 40 meq/Magnesium Sulfate 5 meq/ Multivitamins 10 ml/Chromium/ 

Copper/Manganese/ Seleni/Zn 1 ml/ Insulin Human Regular 30 unit/ Total Lisa

ral Nutrition/Amino Acids/Dextrose/ Fat Emulsion Intravenous 1,920 ml @  80 

mls/hr TPN  CONT IV  Last administered on 6/13/20at 21:19;  Start 6/13/20 at 

22:00;  Stop 6/14/20 at 21:59;  Status DC


Acetylcysteine (Mucomyst 20% Resp Treatment) 600 mg BID NEB  Last administered 

on 6/19/20at 09:33;  Start 6/13/20 at 21:00;  Stop 6/19/20 at 10:39;  Status DC


Magnesium Sulfate 100 ml @  25 mls/hr 1X  ONCE IV  Last administered on 

6/13/20at 15:48;  Start 6/13/20 at 15:45;  Stop 6/13/20 at 19:44;  Status DC


Potassium Acetate 40 meq/Magnesium Sulfate 5 meq/ Multivitamins 10 ml/Chromium/ 

Copper/Manganese/ Seleni/Zn 1 ml/ Insulin Human Regular 30 unit/ Total 

Parenteral Nutrition/Amino Acids/Dextrose/ Fat Emulsion Intravenous 1,920 ml @  

80 mls/hr TPN  CONT IV  Last administered on 6/14/20at 21:35;  Start 6/14/20 at 

22:00;  Stop 6/15/20 at 21:59;  Status DC


Potassium Chloride/Water 100 ml @  100 mls/hr Q1H IV  Last administered on 

6/15/20at 08:31;  Start 6/15/20 at 07:00;  Stop 6/15/20 at 08:59;  Status DC


Potassium Acetate 40 meq/Magnesium Sulfate 5 meq/ Multivitamins 10 ml/Chromium/ 

Copper/Manganese/ Seleni/Zn 1 ml/ Insulin Human Regular 30 unit/ Total 

Parenteral Nutrition/Amino Acids/Dextrose/ Fat Emulsion Intravenous 1,920 ml @  

80 mls/hr TPN  CONT IV  Last administered on 6/15/20at 21:54;  Start 6/15/20 at 

22:00;  Stop 6/16/20 at 19:34;  Status DC


Lidocaine HCl (Buffered Lidocaine 1%) 3 ml STK-MED ONCE .ROUTE ;  Start 6/15/20 

at 12:14;  Stop 6/15/20 at 12:14;  Status DC


Lidocaine HCl (Buffered Lidocaine 1%) 3 ml 1X  ONCE IJ  Last administered on 

6/15/20at 13:11;  Start 6/15/20 at 13:00;  Stop 6/15/20 at 13:01;  Status DC


Magnesium Sulfate 50 ml @ 25 mls/hr 1X  ONCE IV ;  Start 6/16/20 at 08:15;  Stop

6/16/20 at 10:14;  Status DC


Potassium Acetate 40 meq/Magnesium Sulfate 10 meq/ Multivitamins 10 ml/Chromium/

Copper/Manganese/ Seleni/Zn 1 ml/ Insulin Human Regular 20 unit/ Total 

Parenteral Nutrition/Amino Acids/Dextrose/ Fat Emulsion Intravenous 1,920 ml @  

80 mls/hr TPN  CONT IV  Last administered on 6/16/20at 21:32;  Start 6/16/20 at 

22:00;  Stop 6/17/20 at 21:59;  Status DC


Potassium Chloride/Water 100 ml @  100 mls/hr Q1H IV  Last administered on 

6/17/20at 09:12;  Start 6/17/20 at 08:00;  Stop 6/17/20 at 09:59;  Status DC


Alteplase, Recombinant (Cathflo For Central Catheter Clearance) 4 mg 1X  ONCE 

INT CAT ;  Start 6/17/20 at 09:15;  Stop 6/17/20 at 09:16;  Status UNV


Alteplase, Recombinant (Cathflo For Central Catheter Clearance) 4 mg 1X  ONCE 

INT CAT ;  Start 6/17/20 at 09:15;  Stop 6/17/20 at 09:16;  Status UNV


Alteplase, Recombinant (Cathflo For Central Catheter Clearance) 4 mg 1X  ONCE 

INT CAT ;  Start 6/17/20 at 09:15;  Stop 6/17/20 at 09:16;  Status UNV


Alteplase, Recombinant 4 mg/ Sodium Chloride 20 ml @ 20 mls/hr 1X  ONCE IV  Last

administered on 6/17/20at 10:10;  Start 6/17/20 at 10:00;  Stop 6/17/20 at 

10:59;  Status DC


Alteplase, Recombinant 4 mg/ Sodium Chloride 20 ml @ 20 mls/hr 1X  ONCE IV  Last

administered on 6/17/20at 10:09;  Start 6/17/20 at 10:00;  Stop 6/17/20 at 

10:59;  Status DC


Alteplase, Recombinant 4 mg/ Sodium Chloride 20 ml @ 20 mls/hr 1X  ONCE IV  Last

administered on 6/17/20at 10:09;  Start 6/17/20 at 10:00;  Stop 6/17/20 at 

10:59;  Status DC


Potassium Acetate 60 meq/Magnesium Sulfate 10 meq/ Multivitamins 10 ml/Chromium/

Copper/Manganese/ Seleni/Zn 1 ml/ Insulin Human Regular 20 unit/ Total 

Parenteral Nutrition/Amino Acids/Dextrose/ Fat Emulsion Intravenous 1,920 ml @  

80 mls/hr TPN  CONT IV  Last administered on 6/17/20at 21:55;  Start 6/17/20 at 

22:00;  Stop 6/18/20 at 21:59;  Status DC


Albumin Human 500 ml @  125 mls/hr 1X  ONCE IV  Last administered on 6/18/20at 

12:01;  Start 6/18/20 at 11:15;  Stop 6/18/20 at 15:14;  Status DC


Sodium Chloride 500 ml @  500 mls/hr 1X  ONCE IV  Last administered on 6/18/20at

13:50;  Start 6/18/20 at 11:15;  Stop 6/18/20 at 12:14;  Status DC


Potassium Acetate 60 meq/Magnesium Sulfate 14 meq/ Multivitamins 10 ml/Chromium/

Copper/Manganese/ Seleni/Zn 1 ml/ Insulin Human Regular 20 unit/ Total 

Parenteral Nutrition/Amino Acids/Dextrose/ Fat Emulsion Intravenous 1,920 ml @  

80 mls/hr TPN  CONT IV  Last administered on 6/18/20at 22:26;  Start 6/18/20 at 

22:00;  Stop 6/19/20 at 21:59;  Status DC


Ciprofloxacin/ Dextrose 200 ml @  200 mls/hr Q12HR IV  Last administered on 

6/25/20at 08:27;  Start 6/18/20 at 21:00;  Stop 6/25/20 at 08:56;  Status DC


Albumin Human 250 ml @  62.5 mls/hr 1X  ONCE IV  Last administered on 6/19/20at 

11:09;  Start 6/19/20 at 11:00;  Stop 6/19/20 at 14:59;  Status DC


Furosemide (Lasix) 20 mg 1X  ONCE IVP  Last administered on 6/19/20at 14:52;  

Start 6/19/20 at 10:45;  Stop 6/19/20 at 10:49;  Status DC


Potassium Acetate 60 meq/Magnesium Sulfate 14 meq/ Multivitamins 10 ml/Chromium/

Copper/Manganese/ Seleni/Zn 1 ml/ Insulin Human Regular 15 unit/ Total 

Parenteral Nutrition/Amino Acids/Dextrose/ Fat Emulsion Intravenous 1,920 ml @  

80 mls/hr TPN  CONT IV  Last administered on 6/19/20at 22:08;  Start 6/19/20 at 

22:00;  Stop 6/20/20 at 21:59;  Status DC


Potassium Acetate 60 meq/Magnesium Sulfate 14 meq/ Multivitamins 10 ml/Chromium/

Copper/Manganese/ Seleni/Zn 1 ml/ Insulin Human Regular 15 unit/ Total 

Parenteral Nutrition/Amino Acids/Dextrose/ Fat Emulsion Intravenous 1,920 ml @  

80 mls/hr TPN  CONT IV  Last administered on 6/20/20at 22:12;  Start 6/20/20 at 

22:00;  Stop 6/21/20 at 21:59;  Status DC


Potassium Acetate 60 meq/Magnesium Sulfate 14 meq/ Multivitamins 10 ml/Chromium/

Copper/Manganese/ Seleni/Zn 1 ml/ Insulin Human Regular 15 unit/ Total 

Parenteral Nutrition/Amino Acids/Dextrose/ Fat Emulsion Intravenous 1,920 ml @  

80 mls/hr TPN  CONT IV  Last administered on 6/21/20at 22:22;  Start 6/21/20 at 

22:00;  Stop 6/22/20 at 21:59;  Status DC


Furosemide (Lasix) 20 mg 1X  ONCE IVP  Last administered on 6/22/20at 11:07;  

Start 6/22/20 at 10:30;  Stop 6/22/20 at 10:34;  Status DC


Potassium Acetate 60 meq/Magnesium Sulfate 14 meq/ Multivitamins 10 ml/Chromium/

Copper/Manganese/ Seleni/Zn 1 ml/ Insulin Human Regular 15 unit/ Sodium Chloride

20 meq/Total Parenteral Nutrition/Amino Acids/Dextrose/ Fat Emulsion Intravenous

1,920 ml @  80 mls/hr TPN  CONT IV  Last administered on 6/22/20at 21:54;  Start

6/22/20 at 22:00;  Stop 6/23/20 at 21:59;  Status DC


Potassium Acetate 30 meq/Magnesium Sulfate 14 meq/ Multivitamins 10 ml/Chromium/

Copper/Manganese/ Seleni/Zn 1 ml/ Insulin Human Regular 15 unit/ Sodium Chloride

20 meq/Potassium Chloride 30 meq/ Total Parenteral Nutrition/Amino 

Acids/Dextrose/ Fat Emulsion Intravenous 1,920 ml @  80 mls/hr TPN  CONT IV  

Last administered on 6/23/20at 21:46;  Start 6/23/20 at 22:00;  Stop 6/24/20 at 

21:59;  Status DC


Sodium Chloride 80 meq/Potassium Chloride 30 meq/ Potassium Acetate 30 

meq/Magnesium Sulfate 14 meq/ Multivitamins 10 ml/Chromium/ Copper/Manganese/ 

Seleni/Zn 1 ml/ Insulin Human Regular 15 unit/ Total Parenteral Nutrition/Amino 

Acids/Dextrose/ Fat Emulsion Intravenous 1,920 ml @  80 mls/hr TPN  CONT IV  

Last administered on 6/24/20at 22:33;  Start 6/24/20 at 22:00;  Stop 6/25/20 at 

21:59;  Status DC


Furosemide (Lasix) 40 mg 1X  ONCE IVP  Last administered on 6/24/20at 16:27;  

Start 6/24/20 at 15:30;  Stop 6/24/20 at 15:33;  Status DC


Albumin Human 250 ml @  62.5 mls/hr 1X  ONCE IV  Last administered on 6/24/20at 

16:27;  Start 6/24/20 at 15:30;  Stop 6/24/20 at 19:29;  Status DC


Sodium Chloride 80 meq/Potassium Chloride 30 meq/ Potassium Acetate 30 

meq/Magnesium Sulfate 14 meq/ Multivitamins 10 ml/Chromium/ Copper/Manganese/ 

Seleni/Zn 1 ml/ Insulin Human Regular 15 unit/ Total Parenteral Nutrition/Amino 

Acids/Dextrose/ Fat Emulsion Intravenous 1,920 ml @  80 mls/hr TPN  CONT IV  

Last administered on 6/25/20at 22:25;  Start 6/25/20 at 22:00;  Stop 6/26/20 at 

21:59;  Status DC


Sodium Chloride 80 meq/Potassium Chloride 30 meq/ Potassium Acetate 30 m

eq/Magnesium Sulfate 14 meq/ Multivitamins 10 ml/Chromium/ Copper/Manganese/ 

Seleni/Zn 1 ml/ Insulin Human Regular 15 unit/ Total Parenteral Nutrition/[facility] 

Acids/Dextrose/ Fat Emulsion Intravenous 1,920 ml @  80 mls/hr TPN  CONT IV  

Last administered on 6/26/20at 21:32;  Start 6/26/20 at 22:00;  Stop 6/27/20 at 

21:59;  Status DC


Sodium Chloride 80 meq/Potassium Chloride 30 meq/ Potassium Acetate 30 

meq/Magnesium Sulfate 14 meq/ Multivitamins 10 ml/Chromium/ Copper/Manganese/ 

Seleni/Zn 1 ml/ Insulin Human Regular 15 unit/ Total Parenteral Nutrition/[facility] 

Acids/Dextrose/ Fat Emulsion Intravenous 1,920 ml @  80 mls/hr TPN  CONT IV  La

st administered on 6/27/20at 21:53;  Start 6/27/20 at 22:00;  Stop 6/28/20 at 

21:59;  Status DC


Acetylcysteine (Mucomyst 20% Resp Treatment) 600 mg RTBID NEB  Last administered

on 7/1/20at 08:00;  Start 6/27/20 at 12:00


Sodium Chloride 80 meq/Potassium Chloride 30 meq/ Potassium Acetate 30 

meq/Magnesium Sulfate 14 meq/ Multivitamins 10 ml/Chromium/ Copper/Manganese/ 

Seleni/Zn 1 ml/ Insulin Human Regular 15 unit/ Total Parenteral Nutrition/[facility] 

Acids/Dextrose/ Fat Emulsion Intravenous 1,920 ml @  80 mls/hr TPN  CONT IV  

Last administered on 6/28/20at 22:06;  Start 6/28/20 at 22:00;  Stop 6/29/20 at 

21:59;  Status DC


Meropenem 500 mg/ Sodium Chloride 50 ml @  100 mls/hr Q6HRS IV  Last 

administered on 7/1/20at 11:53;  Start 6/28/20 at 18:00


Daptomycin 500 mg/ Sodium Chloride 50 ml @  100 mls/hr Q24H IV  Last 

administered on 6/30/20at 21:48;  Start 6/28/20 at 19:00


Sodium Chloride 80 meq/Potassium Chloride 30 meq/ Potassium Acetate 30 

meq/Magnesium Sulfate 14 meq/ Multivitamins 10 ml/Chromium/ Copper/Manganese/ 

Seleni/Zn 1 ml/ Insulin Human Regular 15 unit/ Total Parenteral Nutrition/[facility] 

Acids/Dextrose/ Fat Emulsion Intravenous 1,920 ml @  80 mls/hr TPN  CONT IV  

Last administered on 6/29/20at 22:09;  Start 6/29/20 at 22:00;  Stop 6/30/20 at 

21:59;  Status DC


Heparin Sodium (Porcine) 1000 unit/Sodium Chloride 1,001 ml @  1,001 mls/hr 1X  

ONCE IRR ;  Start 6/30/20 at 06:00;  Stop 6/30/20 at 06:59;  Status DC


Propofol (Diprivan) 200 mg STK-MED ONCE IV ;  Start 6/30/20 at 07:44;  Stop 

6/30/20 at 07:44;  Status DC


Lidocaine HCl (Lidocaine Pf 2% Vial) 5 ml STK-MED ONCE .ROUTE ;  Start 6/30/20 

at 07:44;  Stop 6/30/20 at 07:44;  Status DC


Fentanyl Citrate (Fentanyl 2ml Vial) 100 mcg STK-MED ONCE .ROUTE ;  Start 

6/30/20 at 07:44;  Stop 6/30/20 at 07:44;  Status DC


Rocuronium Bromide (Zemuron) 100 mg STK-MED ONCE .ROUTE ;  Start 6/30/20 at 

07:44;  Stop 6/30/20 at 07:44;  Status DC


Micafungin Sodium 100 mg/Dextrose 100 ml @  100 mls/hr Q24H IV  Last 

administered on 7/1/20at 09:26;  Start 6/30/20 at 08:30


Bupivacaine HCl/ Epinephrine Bitart (Sensorcain-Epi 0.5%-1:088244 Mpf) 30 ml 

STK-MED ONCE .ROUTE ;  Start 6/30/20 at 08:34;  Stop 6/30/20 at 08:35;  Status 

DC


Iohexol (Omnipaque 300 Mg/ml) 50 ml STK-MED ONCE .ROUTE  Last administered on 

6/30/20at 13:30;  Start 6/30/20 at 08:35;  Stop 6/30/20 at 08:35;  Status DC


Sodium Chloride 80 meq/Potassium Chloride 30 meq/ Potassium Acetate 30 

meq/Magnesium Sulfate 14 meq/ Multivitamins 10 ml/Chromium/ Copper/Manganese/ 

Seleni/Zn 1 ml/ Insulin Human Regular 15 unit/ Total Parenteral Nutrition/[facility] 

Acids/Dextrose/ Fat Emulsion Intravenous 1,920 ml @  80 mls/hr TPN  CONT IV  

Last administered on 7/1/20at 01:22;  Start 6/30/20 at 22:00;  Stop 7/1/20 at 

21:59


Phenylephrine HCl (Ken-Synephrine Inj) 10 mg STK-MED ONCE .ROUTE ;  Start 

6/30/20 at 10:15;  Stop 6/30/20 at 10:15;  Status DC


Desflurane (Suprane) 90 ml STK-MED ONCE IH ;  Start 6/30/20 at 10:18;  Stop 

6/30/20 at 10:19;  Status DC


Albumin Human 500 ml @  As Directed STK-MED ONCE IV ;  Start 6/30/20 at 11:06;  

Stop 6/30/20 at 11:06;  Status DC


Vasopressin (Vasostrict) 20 unit STK-MED ONCE .ROUTE ;  Start 6/30/20 at 12:23; 

Stop 6/30/20 at 12:23;  Status DC


Phenylephrine HCl (Ken-Synephrine Inj) 10 mg STK-MED ONCE .ROUTE ;  Start 

6/30/20 at 13:33;  Stop 6/30/20 at 13:33;  Status DC


Phenylephrine HCl (Ken-Synephrine Inj) 10 mg STK-MED ONCE .ROUTE ;  Start 

6/30/20 at 13:33;  Stop 6/30/20 at 13:33;  Status DC


Ondansetron HCl (Zofran) 4 mg STK-MED ONCE .ROUTE ;  Start 6/30/20 at 13:33;  

Stop 6/30/20 at 13:33;  Status DC


Enoxaparin Sodium (Lovenox 40mg Syringe) 40 mg Q24H SQ ;  Start 7/1/20 at 08:00


Sodium Chloride (Normal Saline Flush) 3 ml QSHIFT  PRN IV AFTER MEDS AND BLOOD 

DRAWS;  Start 6/30/20 at 14:45


Naloxone HCl (Narcan) 0.4 mg PRN Q2MIN  PRN IV SEE INSTRUCTIONS;  Start 6/30/20 

at 14:45


Sodium Chloride 1,000 ml @  25 mls/hr Q24H IV ;  Start 6/30/20 at 14:33


Morphine Sulfate (Morphine Sulfate) 1 mg PRN Q1HR  PRN IV PAIN;  Start 6/30/20 

at 14:45


Midazolam HCl 100 mg/Sodium Chloride 100 ml @ 1 mls/hr CONT  PRN IV SEE I/O 

RECORD Last administered on 7/1/20at 09:23;  Start 6/30/20 at 14:45


Phenylephrine HCl (PHENYLEPHRINE in 0.9% NACL PF) 1 mg STK-MED ONCE IV ;  Start 

6/30/20 at 14:44;  Stop 6/30/20 at 14:45;  Status DC


Ephedrine Sulfate (ePHEDrine PF IN SALINE SYRINGE) 50 mg STK-MED ONCE IV ;  

Start 6/30/20 at 14:45;  Stop 6/30/20 at 14:45;  Status DC


Vasopressin 20 unit/Dextrose 101 ml @  12 mls/hr CONT  PRN IV SEE I/O RECORD 

Last administered on 7/1/20at 09:24;  Start 6/30/20 at 15:30


Sodium Chloride 1,000 ml @  1,000 mls/hr 1X  ONCE IV  Last administered on 

6/30/20at 15:42;  Start 6/30/20 at 15:45;  Stop 6/30/20 at 16:44;  Status DC


Albumin Human 500 ml @  125 mls/hr 1X  ONCE IV ;  Start 6/30/20 at 16:00;  Stop 

6/30/20 at 19:59;  Status DC


Albumin Human 500 ml @  125 mls/hr PRN Q1HR  PRN IV PER PROTOCOL;  Start 6/30/20

at 15:45


Magnesium Sulfate 50 ml @ 25 mls/hr 1X  ONCE IV  Last administered on 6/30/20at 

17:02;  Start 6/30/20 at 16:30;  Stop 6/30/20 at 18:29;  Status DC


Sodium Bicarbonate (Sodium Bicarb Adult 8.4% Syr) 50 meq STK-MED ONCE .ROUTE ;  

Start 6/30/20 at 16:20;  Stop 6/30/20 at 16:20;  Status DC


Sodium Bicarbonate (Sodium Bicarb Adult 8.4% Syr) 100 meq 1X  ONCE IV  Last 

administered on 6/30/20at 17:07;  Start 6/30/20 at 16:30;  Stop 6/30/20 at 

16:31;  Status DC


Sodium Bicarbonate 150 meq/Dextrose 1,150 ml @  75 mls/hr 1X  ONCE IV  Last 

administered on 6/30/20at 20:02;  Start 6/30/20 at 16:30;  Stop 7/1/20 at 07:49;

 Status DC





Active Scripts


Active


Reported


Bisoprolol Fumarate 5 Mg Tablet 10 Mg PO DAILY


Vitals/I & O





Vital Sign - Last 24 Hours








 6/30/20 6/30/20 6/30/20 6/30/20





 15:15 15:20 15:22 15:25


 


Temp    97.6





    97.6


 


Pulse 120 140  155


 


Resp 20 20  20


 


B/P (MAP)    


 


Pulse Ox 100 100 100 100


 


O2 Delivery Ventilator Ventilator Ventilator Ventilator


 


    





    





 6/30/20 6/30/20 6/30/20 6/30/20





 15:30 15:40 15:50 16:00


 


Pulse 140 137 138 


 


Resp 20 20 20 


 


B/P (MAP) 112/ 145/87 (106) 164/99 (120) 


 


Pulse Ox 100 100 100 


 


O2 Delivery Ventilator Ventilator Ventilator 





 6/30/20 6/30/20 6/30/20 6/30/20





 16:00 16:00 16:15 16:30


 


Temp   98.5 





   98.5 


 


Pulse 134  132 128


 


Resp 20  20 20


 


B/P (MAP) 146/86 (106)  105/67 (80) 126/78 (94)


 


Pulse Ox 100  100 100


 


O2 Delivery Ventilator Mechanical Ventilator Ventilator Ventilator


 


    





    





 6/30/20 6/30/20 6/30/20 6/30/20





 16:45 17:00 17:09 17:15


 


Temp 98.8   





 98.8   


 


Pulse 120 134  106


 


Resp 20 20  20


 


B/P (MAP) 106/66 (79) 146/86 (106)  129/79 (96)


 


Pulse Ox 100 100 100 100


 


O2 Delivery Ventilator Ventilator Ventilator Ventilator


 


    





    





 6/30/20 6/30/20 6/30/20 6/30/20





 17:30 17:40 17:45 18:00


 


Temp 98.8 98.5  98.7





 98.8 98.5  98.7


 


Pulse 104 102 110 90


 


Resp 20 20 20 20


 


B/P (MAP) 150/86 (107) 158/86 100/60 (73) 172/90 (117)


 


Pulse Ox 100  100 100


 


O2 Delivery Ventilator  Ventilator Ventilator


 


    





    





 6/30/20 6/30/20 6/30/20 6/30/20





 18:15 18:30 18:45 19:00


 


Temp  98.8  





  98.8  


 


Pulse 100 96 92 96


 


Resp 20 20 20 20


 


B/P (MAP) 114/70 (85) 142/80 (100) 166/90 (115) 109/69 (82)


 


Pulse Ox 100 100 100 100


 


O2 Delivery Ventilator Ventilator Ventilator Ventilator


 


    





    





 6/30/20 6/30/20 6/30/20 6/30/20





 20:00 20:00 20:00 20:02


 


Temp 99.6   





 99.6   


 


Pulse 95 95  


 


Resp 20   


 


B/P (MAP) 116/73 (87) 116/73 (87)  


 


Pulse Ox 100   100


 


O2 Delivery Ventilator  Mechanical Ventilator Ventilator


 


    





    





 6/30/20 6/30/20 6/30/20 6/30/20





 21:00 22:00 22:51 23:00


 


Pulse 99 99  100


 


Resp 20 25 25 24


 


B/P (MAP) 125/73 (90) 132/78 (96)  103/65 (78)


 


Pulse Ox 100 100 100 100


 


O2 Delivery Ventilator Ventilator Ventilator Ventilator


 


O2 Flow Rate   8.0 





 6/30/20 7/1/20 7/1/20 7/1/20





 23:21 00:00 00:00 00:00


 


Temp   100.8 





   100.8 


 


Pulse   107 95


 


Resp   27 


 


B/P (MAP)   127/71 (89) 105/66 (79)


 


Pulse Ox 100  100 


 


O2 Delivery Ventilator Mechanical Ventilator Ventilator 


 


O2 Flow Rate 8.0   


 


    





    





 7/1/20 7/1/20 7/1/20 7/1/20





 00:43 01:00 02:00 02:29


 


Pulse  108 110 


 


Resp  26 24 24


 


B/P (MAP)  105/66 (79) 104/61 (75) 


 


Pulse Ox 100 100 100 100


 


O2 Delivery Ventilator Ventilator Ventilator Ventilator


 


O2 Flow Rate    8.0





 7/1/20 7/1/20 7/1/20 7/1/20





 02:59 03:00 04:00 04:00


 


Temp    99.8





    99.8


 


Pulse  105 103 103


 


Resp 24 23  25


 


B/P (MAP)  100/61 (74) 103/62 (76) 103/62 (76)


 


Pulse Ox 100 100  100


 


O2 Delivery Ventilator Ventilator  Ventilator


 


O2 Flow Rate 8.0   


 


    





    





 7/1/20 7/1/20 7/1/20 7/1/20





 04:00 04:18 05:00 06:00


 


Pulse   103 104


 


Resp   24 24


 


B/P (MAP)   95/60 (72) 120/61 (80)


 


Pulse Ox  100 100 100


 


O2 Delivery Mechanical Ventilator Ventilator Ventilator Ventilator





 7/1/20 7/1/20 7/1/20 7/1/20





 07:00 08:00 08:00 08:12


 


Pulse 104   


 


Resp 20   


 


B/P (MAP) 112/66 (81)   


 


Pulse Ox 100   100


 


O2 Delivery Ventilator  Mechanical Ventilator Ventilator





 7/1/20 7/1/20 7/1/20 7/1/20





 09:25 09:51 11:00 11:09


 


Pulse   112 


 


Resp 24  12 12


 


B/P (MAP)   90/48 (62) 


 


Pulse Ox 100 99 100 99


 


O2 Delivery Ventilator Ventilator Ventilator Ventilator


 


O2 Flow Rate 40.0   





 7/1/20 7/1/20 7/1/20 7/1/20





 11:49 12:00 12:00 12:00


 


Temp   99.6 





   99.6 


 


Pulse   99 


 


Resp   12 


 


B/P (MAP)   91/64 (73) 


 


Pulse Ox 99  100 


 


O2 Delivery Ventilator Mechanical Ventilator Ventilator 


 


    





    





 7/1/20   





 13:00   


 


Pulse 97   


 


Resp 12   


 


B/P (MAP) 97/57 (70)   


 


Pulse Ox 100   


 


O2 Delivery Ventilator   














Intake and Output   


 


 6/30/20 6/30/20 7/1/20





 15:00 23:00 07:00


 


Intake Total 150 ml 3884.3 ml 1128.1 ml


 


Output Total 125 ml 1950 ml 935 ml


 


Balance 25 ml 1934.3 ml 193.1 ml











Hemodynamically unstable?:  No


Is patient in severe pain?:  No


Is NPO status required?:  No











MG ARGUETA MD                  Jul 1, 2020 13:34

## 2020-07-01 NOTE — PDOC
Infectious Disease Note


Subjective:


Subjective


Pt underwent Exploratory laparotomy, lysis of adhesions, subtotal 

cholecystectomy with cholangiogram, gastrojejunostomy tube placement, pancreatic

necrosectomy yesterday


T max 100.8


Intubated/sedated


on pressors


s/p 4 Units PRBCs





Vital Signs:


Vital Signs





Vital Signs








  Date Time  Temp Pulse Resp B/P (MAP) Pulse Ox O2 Delivery O2 Flow Rate FiO2


 


7/1/20 07:00  104 20 112/66 (81) 100 Ventilator  


 


7/1/20 04:00 99.8       





 99.8       


 


7/1/20 02:59       8.0 











Physical Exam:


PHYSICAL EXAM


GENERAL: intubated/sedated


HEENT: Anicteric, no thrush, NG tube in place


NECK:  Tracheostomy +


LUNGS: Diminished aeration bases,  CT on left 


HEART:  S1, S2,regular 


ABDOMEN: Distention, bowel sounds hypoactive soft, grimaces to 

palpation,gildardo x2 


3 ROBERT drains, GT +,


: [place] ( 6/7)


EXTREMITIES: + edema BLE


SKIN: no signs of gen rash 


LUE-PICC  without signs of complications





Medications:


Inpatient Meds:





Current Medications








 Medications


  (Trade)  Dose


 Ordered  Sig/Yee  Start Time


 Stop Time Status Last Admin


Dose Admin


 


 Acetaminophen


  (Tylenol Supp)  650 mg  PRN Q6HRS  PRN  3/24/20 10:30


    6/29/20 18:16


650 MG


 


 Acetaminophen


  (Tylenol)  650 mg  PRN Q6HRS  PRN  3/21/20 03:36


 5/13/20 10:25 DC 4/16/20 19:56


650 MG


 


 Acetylcysteine


  (Mucomyst 20%


 Resp Treatment)  600 mg  RTBID  6/27/20 12:00


    6/30/20 20:01


600 MG


 


 Albumin Human  500 ml @ 


 125 mls/hr  PRN Q1HR  PRN  6/30/20 15:45


     





 


 Albuterol Sulfate


  (Ventolin Neb


 Soln)  2.5 mg  1X  ONCE  3/17/20 22:30


 3/17/20 22:31 DC 3/18/20 00:56


2.5 MG


 


 Albuterol/


 Ipratropium


  (Duoneb)  3 ml  Q4HRS  6/13/20 08:00


    7/1/20 04:28


3 ML


 


 Alteplase,


 Recombinant


  (Cathflo For


 Central Catheter


 Clearance)  4 mg  1X  ONCE  6/17/20 09:15


 6/17/20 09:16 UNV  





 


 Alteplase,


 Recombinant 4 mg/


 Sodium Chloride  20 ml @ 20


 mls/hr  1X  ONCE  6/17/20 10:00


 6/17/20 10:59 DC 6/17/20 10:09


20 MLS/HR


 


 Amino Acids/


 Glycerin/


 Electrolytes  1,000 ml @ 


 75 mls/hr  [id number]  4/20/20 21:15


   UNV  





 


 Artificial Tears


  (Artificial


 Tears)  1 drop  PRN Q15MIN  PRN  4/29/20 05:30


    6/23/20 21:17


1 DROP


 


 Atenolol


  (Tenormin)  100 mg  DAILY  3/17/20 09:00


 3/16/20 20:08 DC  





 


 Atropine Sulfate


  (ATROPINE 0.5mg


 SYRINGE)  0.5 mg  PRN Q5MIN  PRN  4/2/20 08:15


     





 


 Barium Sulfate


  (Varibar Thin


 Liquid Apple)  148 gm  1X  ONCE  5/26/20 11:45


 5/26/20 11:49 DC  





 


 Benzocaine


  (Hurricaine One)  1 spray  1X  ONCE  3/20/20 14:30


 3/20/20 14:31 DC 3/20/20 16:38


1 SPRAY


 


 Bisacodyl


  (Dulcolax Supp)  10 mg  STK-MED ONCE  4/27/20 10:59


 4/27/20 10:59 DC  





 


 Bumetanide


  (Bumex)  2 mg  DAILY  5/8/20 10:00


 5/18/20 17:15 DC 5/18/20 08:07


2 MG


 


 Bupivacaine HCl/


 Epinephrine Bitart


  (Sensorcain-Epi


 0.5%-1:294319 Mpf)  30 ml  STK-MED ONCE  6/30/20 08:34


 6/30/20 08:35 DC  





 


 Calcium Carbonate/


 Glycine


  (Tums)  500 mg  PRN [id number]  PRN  3/18/20 17:45


 5/13/20 10:25 DC  





 


 Calcium Chloride


 1000 mg/Sodium


 Chloride  110 ml @ 


 220 mls/hr  1X  ONCE  3/17/20 22:30


 3/17/20 22:59 DC 3/17/20 22:11


220 MLS/HR


 


 Calcium Chloride


 3000 mg/Sodium


 Chloride  1,030 ml @ 


 50 mls/hr  [id number]  3/19/20 08:00


 3/21/20 15:23 DC 3/21/20 02:17


50 MLS/HR


 


 Calcium Gluconate


  (Calcium


 Gluconate)  2,000 mg  1X  ONCE  3/19/20 02:15


 3/19/20 02:16 DC 3/19/20 02:19


2,000 MG


 


 Calcium Gluconate


 1000 mg/Sodium


 Chloride  110 ml @ 


 220 mls/hr  1X  ONCE  3/18/20 03:30


 3/18/20 03:59 DC 3/18/20 03:21


220 MLS/HR


 


 Calcium Gluconate


 2000 mg/Sodium


 Chloride  120 ml @ 


 220 mls/hr  1X  ONCE  3/18/20 07:30


 3/18/20 08:02 DC 3/18/20 09:05


220 MLS/HR


 


 Cefepime HCl


  (Maxipime)  2 gm  Q12HR  3/25/20 09:00


 4/8/20 09:58 DC 4/7/20 20:56


2 GM


 


 Cellulose


  (Surgicel


 Fibrillar 1x2)  1 each  STK-MED ONCE  4/6/20 11:00


 4/6/20 11:01 DC  





 


 Cellulose


  (Surgicel


 Hemostat 2x14)  1 each  STK-MED ONCE  4/27/20 10:58


 4/27/20 10:59 DC  





 


 Cellulose


  (Surgicel


 Hemostat 4x8)  1 each  STK-MED ONCE  4/27/20 10:58


 4/27/20 10:59 DC  





 


 Chlorhexidine


 Gluconate


  (Peridex)  15 ml  BID  6/13/20 09:00


 6/13/20 07:58 DC  





 


 Ciprofloxacin/


 Dextrose  200 ml @ 


 200 mls/hr  Q12HR  6/18/20 21:00


 6/25/20 08:56 DC 6/25/20 08:27


200 MLS/HR


 


 Cyclobenzaprine


 HCl


  (Flexeril)  10 mg  PRN Q6HRS  PRN  4/30/20 10:45


     





 


 Daptomycin 410 mg/


 Sodium Chloride  50 ml @ 


 100 mls/hr  Q24H  6/7/20 14:00


 6/10/20 08:30 DC 6/9/20 13:33


100 MLS/HR


 


 Daptomycin 430 mg/


 Sodium Chloride  50 ml @ 


 100 mls/hr  Q24H  4/25/20 13:00


 4/30/20 20:58 DC 4/30/20 13:00


100 MLS/HR


 


 Daptomycin 450 mg/


 Sodium Chloride  50 ml @ 


 100 mls/hr  Q24H  5/17/20 09:00


 5/21/20 08:30 DC 5/20/20 09:25


100 MLS/HR


 


 Daptomycin 485 mg/


 Sodium Chloride  50 ml @ 


 100 mls/hr  Q24H  5/4/20 11:00


 5/12/20 07:44 DC 5/11/20 13:10


100 MLS/HR


 


 Daptomycin 500 mg/


 Sodium Chloride  50 ml @ 


 100 mls/hr  Q24H  6/28/20 19:00


    6/30/20 21:48


100 MLS/HR


 


 Desflurane


  (Suprane)  90 ml  STK-MED ONCE  6/30/20 10:18


 6/30/20 10:19 DC  





 


 Dexamethasone


 Sodium Phosphate


  (Decadron)  4 mg  STK-MED ONCE  4/27/20 10:56


 4/27/20 10:57 DC  





 


 Dexmedetomidine


 HCl 400 mcg/


 Sodium Chloride  100 ml @ 0


 mls/hr  CONT  PRN  4/2/20 08:15


 5/30/20 18:31 DC 5/30/20 12:57


8 MLS/HR


 


 Dextrose


  (Dextrose


 50%-Water Syringe)  12.5 gm  PRN Q15MIN  PRN  3/16/20 09:30


     





 


 Digoxin


  (Lanoxin)  125 mcg  1X  ONCE  3/19/20 18:00


 3/19/20 18:01 DC 3/19/20 17:10


125 MCG


 


 Diphenhydramine


 HCl


  (Benadryl)  25 mg  1X PRN  PRN  4/24/20 15:45


 4/25/20 15:44 DC  





 


 Duloxetine HCl


  (Cymbalta)  30 mg  DAILY  5/10/20 14:00


 5/13/20 10:25 DC 5/11/20 09:48


30 MG


 


 Enoxaparin Sodium


  (Lovenox 100mg


 Syringe)  100 mg  Q12HR  4/21/20 21:00


   UNV  





 


 Enoxaparin Sodium


  (Lovenox 40mg


 Syringe)  40 mg  Q24H  7/1/20 08:00


     





 


 Ephedrine Sulfate


  (ePHEDrine PF IN


 SALINE SYRINGE)  50 mg  STK-MED ONCE  6/30/20 14:45


 6/30/20 14:45 DC  





 


 Etomidate


  (Amidate)  8 mg  1X  ONCE  3/23/20 08:30


 3/23/20 08:31 DC 3/23/20 08:33


8 MG


 


 Fentanyl


  (Duragesic 50mcg/


 Hr Patch)  1 patch  Q72H  6/4/20 21:00


 6/13/20 12:00 DC 6/4/20 21:22


1 PATCH


 


 Fentanyl Citrate


  (Fentanyl 2ml


 Vial)  100 mcg  STK-MED ONCE  6/30/20 07:44


 6/30/20 07:44 DC  





 


 Fentanyl Citrate


  (Fentanyl 5ml


 Vial)  250 mcg  1X  ONCE  5/8/20 09:15


 5/8/20 09:16 DC 5/8/20 09:30


50 MCG


 


 Flumazenil


  (Romazicon)  0.5 mg  STK-MED ONCE  6/7/20 14:48


 6/7/20 14:48 DC  





 


 Fluoxetine HCl


  (PROzac)  20 mg  QHS  6/4/20 21:00


    6/27/20 21:52


20 MG


 


 Furosemide


  (Lasix)  40 mg  1X  ONCE  6/24/20 15:30


 6/24/20 15:33 DC 6/24/20 16:27


40 MG


 


 Haloperidol


 Lactate


  (Haldol Inj)  3 mg  1X  ONCE  5/4/20 14:30


 5/4/20 14:31 DC 5/4/20 14:37


3 MG


 


 Heparin Sodium


  (Porcine)


  (Hep Lock Adult)  500 unit  STK-MED ONCE  4/7/20 09:29


 4/7/20 09:30 DC  





 


 Heparin Sodium


  (Porcine)


  (Heparin Sodium)  5,000 unit  Q12HR  4/27/20 21:00


 5/7/20 09:59 DC 5/6/20 20:57


5,000 UNIT


 


 Heparin Sodium


  (Porcine) 1000


 unit/Sodium


 Chloride  1,001 ml @ 


 1,001 mls/hr  1X  ONCE  6/30/20 06:00


 6/30/20 06:59 DC  





 


 Hydromorphone HCl


  (Dilaudid


 Standard PCA)  12 mg  STK-MED ONCE  5/1/20 15:50


 5/12/20 11:24 DC  





 


 Hydromorphone HCl


  (Dilaudid)  1 mg  PRN Q4HRS  PRN  5/4/20 19:00


 5/18/20 17:10 DC 5/18/20 06:25


1 MG


 


 Info


  (CONTRAST GIVEN


 -- Rx MONITORING)  1 each  PRN DAILY  PRN  3/30/20 11:45


 4/1/20 11:44 DC  





 


 Info


  (Icu Electrolyte


 Protocol)  1 ea  CONT PRN  PRN  3/29/20 13:15


     





 


 Info


  (PHARMACY


 MONITORING -- do


 not chart)  1 each  PRN DAILY  PRN  4/24/20 15:45


 5/26/20 14:14 DC  





 


 Info


  (Tpn Per


 Pharmacy)  1 each  PRN DAILY  PRN  3/18/20 12:30


   UNV  





 


 Insulin Human


 Lispro


  (HumaLOG)  0-9 UNITS  Q6HRS  3/16/20 09:30


    7/1/20 06:10


5 UNITS


 


 Insulin Human


 Regular


  (HumuLIN R VIAL)  5 unit  1X  ONCE  3/17/20 22:30


 3/17/20 22:31 DC 3/17/20 22:14


5 UNIT


 


 Iohexol


  (Omnipaque 240


 Mg/ml)  30 ml  1X  ONCE  3/30/20 11:30


 3/30/20 11:33 DC 3/30/20 11:30


30 ML


 


 Iohexol


  (Omnipaque 300


 Mg/ml)  50 ml  STK-MED ONCE  6/30/20 08:35


 6/30/20 08:35 DC 6/30/20 13:30


10 ML


 


 Iohexol


  (Omnipaque 350


 Mg/ml)  90 ml  1X  ONCE  3/16/20 03:30


 3/16/20 03:31 DC 3/16/20 03:25


90 ML


 


 Ketorolac


 Tromethamine


  (Toradol 30mg


 Vial)  30 mg  1X  ONCE  3/16/20 03:00


 3/16/20 03:01 DC 3/16/20 02:54


30 MG


 


 Lidocaine HCl


  (Buffered


 Lidocaine 1%)  3 ml  1X  ONCE  6/15/20 13:00


 6/15/20 13:01 DC 6/15/20 13:11


12 ML


 


 Lidocaine HCl


  (Glydo


  (Lidocaine) Jelly)  1 thomas  1X  ONCE  3/20/20 14:30


 3/20/20 14:31 DC 3/20/20 16:38


1 THOMAS


 


 Lidocaine HCl


  (Lidocaine 1%


 20ml Vial)  20 ml  1X  ONCE  6/7/20 15:00


 6/7/20 15:01 DC 6/7/20 15:30


20 ML


 


 Lidocaine HCl


  (Lidocaine Pf 2%


 Vial)  5 ml  STK-MED ONCE  6/30/20 07:44


 6/30/20 07:44 DC  





 


 Lidocaine HCl


  (Xylocaine-Mpf


 1% 2ml Vial)  2 ml  PRN 1X  PRN  4/27/20 07:00


 4/28/20 06:59 DC  





 


 Linezolid/Dextrose  300 ml @ 


 300 mls/hr  Q12HR  5/17/20 09:00


 5/20/20 08:11 DC 5/19/20 21:08


300 MLS/HR


 


 Lorazepam


  (Ativan Inj)  0.25 mg  PRN Q4HRS  PRN  6/3/20 07:30


    6/27/20 13:37


0.25 MG


 


 Magnesium Sulfate  50 ml @ 25


 mls/hr  1X  ONCE  6/30/20 16:30


 6/30/20 18:29 DC 6/30/20 17:02


25 MLS/HR


 


 Meropenem 1 gm/


 Sodium Chloride  100 ml @ 


 200 mls/hr  Q12HR  6/7/20 21:00


 6/25/20 08:56 DC 6/25/20 08:27


200 MLS/HR


 


 Meropenem 500 mg/


 Sodium Chloride  50 ml @ 


 100 mls/hr  Q6HRS  6/28/20 18:00


    7/1/20 05:59


100 MLS/HR


 


 Methylprednisolone


 Sodium Succinate


  (SOLU-Medrol


 125MG VIAL)  125 mg  1X  ONCE  6/13/20 06:15


 6/13/20 06:16 DC 6/13/20 06:26


125 MG


 


 Metoclopramide HCl


  (Reglan Vial)  10 mg  PRN Q3HRS  PRN  5/9/20 16:45


    5/14/20 04:25


10 MG


 


 Metoprolol


 Tartrate


  (Lopressor Vial)  5 mg  PRN Q6HRS  PRN  6/10/20 09:00


    6/18/20 10:14


5 MG


 


 Metronidazole  100 ml @ 


 100 mls/hr  Q8HRS  4/14/20 10:00


 4/21/20 08:10 DC 4/21/20 06:04


100 MLS/HR


 


 Micafungin Sodium


 100 mg/Dextrose  100 ml @ 


 100 mls/hr  Q24H  6/30/20 08:30


    6/30/20 08:21


100 MLS/HR


 


 Midazolam HCl


  (Versed)  2 mg  1X  ONCE  6/7/20 15:00


 6/7/20 15:01 DC 6/7/20 15:28


1 MG


 


 Midazolam HCl 100


 mg/Sodium Chloride  100 ml @ 1


 mls/hr  CONT  PRN  6/30/20 14:45


    6/30/20 21:49


10 MLS/HR


 


 Midazolam HCl 50


 mg/Sodium Chloride  50 ml @ 0


 mls/hr  CONT  PRN  3/23/20 08:15


 3/28/20 15:59 DC 3/26/20 22:39


7 MLS/HR


 


 Morphine Sulfate


  (Morphine


 Sulfate)  1 mg  PRN Q1HR  PRN  6/30/20 14:45


     





 


 Multi-Ingred


 Cream/Lotion/Oil/


 Oint


  (Artificial


 Tears Eye


 Ointment)  1 thomas  PRN Q1HR  PRN  3/25/20 17:30


 6/3/20 14:39 DC 4/13/20 08:19


1 THOMAS


 


 Naloxone HCl


  (Narcan)  0.4 mg  PRN Q2MIN  PRN  6/30/20 14:45


     





 


 Norepinephrine


 Bitartrate 8 mg/


 Dextrose  258 ml @ 


 13.332 mls/


 hr  CONT  PRN  6/7/20 06:30


    6/30/20 15:41


93.325 MLS/HR


 


 Ondansetron HCl


  (Zofran)  4 mg  STK-MED ONCE  6/30/20 13:33


 6/30/20 13:33 DC  





 


 Pantoprazole


 Sodium


  (PROTONIX VIAL


 for IV PUSH)  40 mg  DAILYAC  3/16/20 11:30


    6/30/20 07:45


40 MG


 


 Phenylephrine HCl


  (Ken-Synephrine


 Inj)  10 mg  STK-MED ONCE  6/30/20 13:33


 6/30/20 13:33 DC  





 


 Phenylephrine HCl


  (PHENYLEPHRINE


 in 0.9% NACL PF)  1 mg  STK-MED ONCE  6/30/20 14:44


 6/30/20 14:45 DC  





 


 Piperacillin Sod/


 Tazobactam Sod


 3.375 gm/Sodium


 Chloride  50 ml @ 


 100 mls/hr  Q6HRS  5/27/20 12:00


 6/4/20 07:26 DC 6/4/20 06:10


100 MLS/HR


 


 Piperacillin Sod/


 Tazobactam Sod


 4.5 gm/Sodium


 Chloride  100 ml @ 


 200 mls/hr  1X  ONCE  3/16/20 06:00


 3/16/20 06:29 DC 3/16/20 05:44


200 MLS/HR


 


 Potassium


 Chloride 110 meq/


 Magnesium Sulfate


 20 meq/


 Multivitamins 10


 ml/Chromium/


 Copper/Manganese/


 Seleni/Zn 1 ml/


 Insulin Human


 Regular 15 unit/


 Total Parenteral


 Nutrition/Amino


 Acids/Dextrose/


 Fat Emulsion


 Intravenous  1,800 ml @ 


 75 mls/hr  TPN  CONT  5/24/20 22:00


 5/25/20 21:59 DC 5/24/20 22:48


75 MLS/HR


 


 Potassium


 Chloride 15 meq/


 Bicarbonate


 Dialysis Soln w/


 out KCl  5,007.5 ml


  @ 1,000 mls/


 hr  Q5H1M  3/29/20 20:00


 4/2/20 13:08 DC 4/1/20 18:14


1,000 MLS/HR


 


 Potassium


 Chloride 20 meq/


 Bicarbonate


 Dialysis Soln w/


 out KCl  5,010 ml @ 


 1,000 mls/hr  Q5H1M  3/25/20 16:00


 3/29/20 19:59 DC 3/29/20 14:54


1,000 MLS/HR


 


 Potassium


 Chloride 40 meq/


 Potassium Acetate


 60 meq/Magnesium


 Sulfate 10 meq/


 Multivitamins 10


 ml/Chromium/


 Copper/Manganese/


 Seleni/Zn 1 ml/


 Insulin Human


 Regular 20 unit/


 Total Parenteral


 Nutrition/Amino


 Acids/Dextrose/


 Fat Emulsion


 Intravenous  1,800 ml @ 


 75 mls/hr  TPN  CONT  6/4/20 22:00


 6/5/20 21:59 DC 6/5/20 00:03


75 MLS/HR


 


 Potassium


 Chloride 70 meq/


 Magnesium Sulfate


 20 meq/


 Multivitamins 10


 ml/Chromium/


 Copper/Manganese/


 Seleni/Zn 1 ml/


 Insulin Human


 Regular 15 unit/


 Total Parenteral


 Nutrition/Amino


 Acids/Dextrose/


 Fat Emulsion


 Intravenous  1,800 ml @ 


 75 mls/hr  TPN  CONT  5/29/20 22:00


 5/30/20 21:59 DC 5/29/20 23:13


75 MLS/HR


 


 Potassium


 Chloride 75 meq/


 Magnesium Sulfate


 15 meq/


 Multivitamins 10


 ml/Chromium/


 Copper/Manganese/


 Seleni/Zn 0.5 ml/


 Insulin Human


 Regular 15 unit/


 Total Parenteral


 Nutrition/Amino


 Acids/Dextrose/


 Fat Emulsion


 Intravenous  1,920 ml @ 


 80 mls/hr  TPN  CONT  5/9/20 22:00


 5/10/20 21:59 DC 5/9/20 22:41


80 MLS/HR


 


 Potassium


 Chloride 75 meq/


 Magnesium Sulfate


 15 meq/Calcium


 Gluconate 8 meq/


 Multivitamins 10


 ml/Chromium/


 Copper/Manganese/


 Seleni/Zn 0.5 ml/


 Insulin Human


 Regular 15 unit/


 Total Parenteral


 Nutrition/Amino


 Acids/Dextrose/


 Fat Emulsion


 Intravenous  1,920 ml @ 


 80 mls/hr  TPN  CONT  5/7/20 22:00


 5/8/20 21:59 DC 5/7/20 22:28


80 MLS/HR


 


 Potassium


 Chloride 75 meq/


 Magnesium Sulfate


 15 meq/Calcium


 Gluconate 8 meq/


 Multivitamins 10


 ml/Chromium/


 Copper/Manganese/


 Seleni/Zn 0.5 ml/


 Insulin Human


 Regular 20 unit/


 Total Parenteral


 Nutrition/Amino


 Acids/Dextrose/


 Fat Emulsion


 Intravenous  1,920 ml @ 


 80 mls/hr  TPN  CONT  5/6/20 22:00


 5/7/20 21:59 DC 5/6/20 22:00


80 MLS/HR


 


 Potassium


 Chloride 75 meq/


 Magnesium Sulfate


 15 meq/Calcium


 Gluconate 8 meq/


 Multivitamins 10


 ml/Chromium/


 Copper/Manganese/


 Seleni/Zn 0.5 ml/


 Insulin Human


 Regular 25 unit/


 Total Parenteral


 Nutrition/Amino


 Acids/Dextrose/


 Fat Emulsion


 Intravenous  1,920 ml @ 


 80 mls/hr  TPN  CONT  5/4/20 22:00


 5/5/20 21:59 DC 5/4/20 23:08


80 MLS/HR


 


 Potassium


 Chloride 75 meq/


 Magnesium Sulfate


 20 meq/Calcium


 Gluconate 10 meq/


 Multivitamins 10


 ml/Chromium/


 Copper/Manganese/


 Seleni/Zn 0.5 ml/


 Insulin Human


 Regular 25 unit/


 Total Parenteral


 Nutrition/Amino


 Acids/Dextrose/


 Fat Emulsion


 Intravenous  1,920 ml @ 


 80 mls/hr  TPN  CONT  5/3/20 22:00


 5/4/20 21:59 DC 5/3/20 22:04


80 MLS/HR


 


 Potassium


 Chloride 75 meq/


 Magnesium Sulfate


 20 meq/Calcium


 Gluconate 10 meq/


 Multivitamins 10


 ml/Chromium/


 Copper/Manganese/


 Seleni/Zn 0.5 ml/


 Insulin Human


 Regular 30 unit/


 Total Parenteral


 Nutrition/Amino


 Acids/Dextrose/


 Fat Emulsion


 Intravenous  1,920 ml @ 


 80 mls/hr  TPN  CONT  5/2/20 22:00


 5/3/20 22:00 DC 5/2/20 21:51


80 MLS/HR


 


 Potassium


 Chloride 80 meq/


 Magnesium Sulfate


 20 meq/


 Multivitamins 10


 ml/Chromium/


 Copper/Manganese/


 Seleni/Zn 0.5 ml/


 Insulin Human


 Regular 15 unit/


 Total Parenteral


 Nutrition/Amino


 Acids/Dextrose/


 Fat Emulsion


 Intravenous  1,920 ml @ 


 80 mls/hr  TPN  CONT  5/12/20 22:00


 5/13/20 21:59 DC 5/12/20 21:40


80 MLS/HR


 


 Potassium


 Chloride 80 meq/


 Magnesium Sulfate


 20 meq/


 Multivitamins 10


 ml/Chromium/


 Copper/Manganese/


 Seleni/Zn 1 ml/


 Insulin Human


 Regular 15 unit/


 Total Parenteral


 Nutrition/Amino


 Acids/Dextrose/


 Fat Emulsion


 Intravenous  1,800 ml @ 


 75 mls/hr  TPN  CONT  5/31/20 22:00


 6/1/20 21:59 DC 5/31/20 21:54


75 MLS/HR


 


 Potassium


 Chloride 90 meq/


 Magnesium Sulfate


 20 meq/


 Multivitamins 10


 ml/Chromium/


 Copper/Manganese/


 Seleni/Zn 1 ml/


 Insulin Human


 Regular 15 unit/


 Total Parenteral


 Nutrition/Amino


 Acids/Dextrose/


 Fat Emulsion


 Intravenous  1,800 ml @ 


 75 mls/hr  TPN  CONT  5/20/20 22:00


 5/21/20 21:59 DC 5/20/20 22:28


75 MLS/HR


 


 Potassium


 Chloride 90 meq/


 Magnesium Sulfate


 20 meq/


 Multivitamins 10


 ml/Chromium/


 Copper/Manganese/


 Seleni/Zn 1 ml/


 Insulin Human


 Regular 20 unit/


 Total Parenteral


 Nutrition/Amino


 Acids/Dextrose/


 Fat Emulsion


 Intravenous  1,800 ml @ 


 75 mls/hr  TPN  CONT  6/3/20 22:00


 6/4/20 21:59 DC 6/3/20 23:13


75 MLS/HR


 


 Potassium


 Chloride/Water  100 ml @ 


 100 mls/hr  Q1H  6/17/20 08:00


 6/17/20 09:59 DC 6/17/20 09:12


100 MLS/HR


 


 Potassium


 Phosphate 20 mmol/


 Sodium Chloride  106.6667


 ml @ 


 51.667 m...  1X  ONCE  3/25/20 13:00


 3/25/20 15:03 DC 3/25/20 12:51


51.667 MLS/HR


 


 Potassium Acetate


 30 meq/Magnesium


 Sulfate 14 meq/


 Multivitamins 10


 ml/Chromium/


 Copper/Manganese/


 Seleni/Zn 1 ml/


 Insulin Human


 Regular 15 unit/


 Sodium Chloride


 20 meq/Potassium


 Chloride 30 meq/


 Total Parenteral


 Nutrition/Amino


 Acids/Dextrose/


 Fat Emulsion


 Intravenous  1,920 ml @ 


 80 mls/hr  TPN  CONT  6/23/20 22:00


 6/24/20 21:59 DC 6/23/20 21:46


80 MLS/HR


 


 Potassium Acetate


 30 meq/Magnesium


 Sulfate 20 meq/


 Calcium Gluconate


 10 meq/


 Multivitamins 10


 ml/Chromium/


 Copper/Manganese/


 Seleni/Zn 0.5 ml/


 Insulin Human


 Regular 30 unit/


 Potassium


 Chloride 30 meq/


 Total Parenteral


 Nutrition/Amino


 Acids/Dextrose/


 Fat Emulsion


 Intravenous  1,920 ml @ 


 80 mls/hr  TPN  CONT  5/1/20 22:00


 5/2/20 21:59 DC 5/1/20 22:34


80 MLS/HR


 


 Potassium Acetate


 40 meq/Magnesium


 Sulfate 10 meq/


 Multivitamins 10


 ml/Chromium/


 Copper/Manganese/


 Seleni/Zn 1 ml/


 Insulin Human


 Regular 20 unit/


 Total Parenteral


 Nutrition/Amino


 Acids/Dextrose/


 Fat Emulsion


 Intravenous  1,920 ml @ 


 80 mls/hr  TPN  CONT  6/16/20 22:00


 6/17/20 21:59 DC 6/16/20 21:32


80 MLS/HR


 


 Potassium Acetate


 40 meq/Magnesium


 Sulfate 5 meq/


 Multivitamins 10


 ml/Chromium/


 Copper/Manganese/


 Seleni/Zn 1 ml/


 Insulin Human


 Regular 30 unit/


 Total Parenteral


 Nutrition/Amino


 Acids/Dextrose/


 Fat Emulsion


 Intravenous  1,920 ml @ 


 80 mls/hr  TPN  CONT  6/15/20 22:00


 6/16/20 19:34 DC 6/15/20 21:54


80 MLS/HR


 


 Potassium Acetate


 55 meq/Magnesium


 Sulfate 20 meq/


 Calcium Gluconate


 10 meq/


 Multivitamins 10


 ml/Chromium/


 Copper/Manganese/


 Seleni/Zn 0.5 ml/


 Insulin Human


 Regular 30 unit/


 Total Parenteral


 Nutrition/Amino


 Acids/Dextrose/


 Fat Emulsion


 Intravenous  1,920 ml @ 


 80 mls/hr  TPN  CONT  4/30/20 22:00


 5/1/20 21:59 DC 5/1/20 01:00


80 MLS/HR


 


 Potassium Acetate


 55 meq/Magnesium


 Sulfate 20 meq/


 Calcium Gluconate


 10 meq/


 Multivitamins 10


 ml/Chromium/


 Copper/Manganese/


 Seleni/Zn 0.5 ml/


 Insulin Human


 Regular 35 unit/


 Total Parenteral


 Nutrition/Amino


 Acids/Dextrose/


 Fat Emulsion


 Intravenous  1,920 ml @ 


 80 mls/hr  TPN  CONT  4/28/20 22:00


 4/29/20 21:59 DC 4/28/20 22:02


80 MLS/HR


 


 Potassium Acetate


 60 meq/Magnesium


 Sulfate 10 meq/


 Multivitamins 10


 ml/Chromium/


 Copper/Manganese/


 Seleni/Zn 1 ml/


 Insulin Human


 Regular 20 unit/


 Total Parenteral


 Nutrition/Amino


 Acids/Dextrose/


 Fat Emulsion


 Intravenous  1,920 ml @ 


 80 mls/hr  TPN  CONT  6/17/20 22:00


 6/18/20 21:59 DC 6/17/20 21:55


80 MLS/HR


 


 Potassium Acetate


 60 meq/Magnesium


 Sulfate 14 meq/


 Multivitamins 10


 ml/Chromium/


 Copper/Manganese/


 Seleni/Zn 1 ml/


 Insulin Human


 Regular 15 unit/


 Sodium Chloride


 20 meq/Total


 Parenteral


 Nutrition/Amino


 Acids/Dextrose/


 Fat Emulsion


 Intravenous  1,920 ml @ 


 80 mls/hr  TPN  CONT  6/22/20 22:00


 6/23/20 21:59 DC 6/22/20 21:54


80 MLS/HR


 


 Potassium Acetate


 60 meq/Magnesium


 Sulfate 14 meq/


 Multivitamins 10


 ml/Chromium/


 Copper/Manganese/


 Seleni/Zn 1 ml/


 Insulin Human


 Regular 15 unit/


 Total Parenteral


 Nutrition/Amino


 Acids/Dextrose/


 Fat Emulsion


 Intravenous  1,920 ml @ 


 80 mls/hr  TPN  CONT  6/21/20 22:00


 6/22/20 21:59 DC 6/21/20 22:22


80 MLS/HR


 


 Potassium Acetate


 60 meq/Magnesium


 Sulfate 14 meq/


 Multivitamins 10


 ml/Chromium/


 Copper/Manganese/


 Seleni/Zn 1 ml/


 Insulin Human


 Regular 20 unit/


 Total Parenteral


 Nutrition/Amino


 Acids/Dextrose/


 Fat Emulsion


 Intravenous  1,920 ml @ 


 80 mls/hr  TPN  CONT  6/18/20 22:00


 6/19/20 21:59 DC 6/18/20 22:26


80 MLS/HR


 


 Potassium Acetate


 60 meq/Magnesium


 Sulfate 5 meq/


 Multivitamins 10


 ml/Chromium/


 Copper/Manganese/


 Seleni/Zn 1 ml/


 Insulin Human


 Regular 30 unit/


 Total Parenteral


 Nutrition/Amino


 Acids/Dextrose/


 Fat Emulsion


 Intravenous  1,920 ml @ 


 80 mls/hr  TPN  CONT  6/6/20 22:00


 6/7/20 21:59 DC 6/6/20 21:54


80 MLS/HR


 


 Potassium Acetate


 65 meq/Magnesium


 Sulfate 20 meq/


 Calcium Gluconate


 10 meq/


 Multivitamins 10


 ml/Chromium/


 Copper/Manganese/


 Seleni/Zn 0.5 ml/


 Insulin Human


 Regular 30 unit/


 Total Parenteral


 Nutrition/Amino


 Acids/Dextrose/


 Fat Emulsion


 Intravenous  1,920 ml @ 


 80 mls/hr  TPN  CONT  4/29/20 22:00


 4/30/20 21:59 DC 4/29/20 22:22


80 MLS/HR


 


 Potassium Acetate


 80 meq/Magnesium


 Sulfate 5 meq/


 Multivitamins 10


 ml/Chromium/


 Copper/Manganese/


 Seleni/Zn 1 ml/


 Insulin Human


 Regular 20 unit/


 Total Parenteral


 Nutrition/Amino


 Acids/Dextrose/


 Fat Emulsion


 Intravenous  1,920 ml @ 


 80 mls/hr  TPN  CONT  6/5/20 22:00


 6/6/20 21:59 DC 6/5/20 21:59


80 MLS/HR


 


 Prochlorperazine


 Edisylate


  (Compazine)  5 mg  PACU PRN  PRN  4/27/20 07:00


 4/28/20 06:59 DC  





 


 Propofol


  (Diprivan)  200 mg  STK-MED ONCE  6/30/20 07:44


 6/30/20 07:44 DC  





 


 Ringer's Solution  1,000 ml @ 


 30 mls/hr  Q24H  4/27/20 07:00


 4/27/20 18:59 DC  





 


 Rocuronium Bromide


  (Zemuron)  100 mg  STK-MED ONCE  6/30/20 07:44


 6/30/20 07:44 DC  





 


 Saliva Substitute


  (Biotene


 Moisturizing


 Mouth)  2 spray  PRN Q15MIN  PRN  5/21/20 11:00


     





 


 Sevoflurane


  (Ultane)  60 ml  STK-MED ONCE  4/27/20 12:26


 4/27/20 12:27 DC  





 


 Sodium


 Bicarbonate 150


 meq/Dextrose  1,150 ml @ 


 75 mls/hr  1X  ONCE  6/30/20 16:30


 7/1/20 07:49 DC 6/30/20 20:02


75 MLS/HR


 


 Sodium


 Bicarbonate 50


 meq/Sodium


 Chloride  1,050 ml @ 


 75 mls/hr  Q14H  3/18/20 07:30


 3/23/20 10:28 DC 3/22/20 21:10


75 MLS/HR


 


 Sodium Acetate 50


 meq/Potassium


 Acetate 55 meq/


 Magnesium Sulfate


 20 meq/Calcium


 Gluconate 10 meq/


 Multivitamins 10


 ml/Chromium/


 Copper/Manganese/


 Seleni/Zn 0.5 ml/


 Insulin Human


 Regular 35 unit/


 Total Parenteral


 Nutrition/Amino


 Acids/Dextrose/


 Fat Emulsion


 Intravenous  1,800 ml @ 


 75 mls/hr  TPN  CONT  4/25/20 22:00


 4/26/20 21:59 DC 4/25/20 22:03


75 MLS/HR


 


 Sodium Bicarbonate


  (Sodium Bicarb


 Adult 8.4% Syr)  100 meq  1X  ONCE  6/30/20 16:30


 6/30/20 16:31 DC 6/30/20 17:07


100 MEQ


 


 Sodium Chloride  1,000 ml @ 


 1,000 mls/hr  1X  ONCE  6/30/20 15:45


 6/30/20 16:44 DC 6/30/20 15:42


1,000 MLS/HR


 


 Sodium Chloride


  (Normal Saline


 Flush)  3 ml  QSHIFT  PRN  6/30/20 14:45


     





 


 Sodium Chloride


 80 meq/Potassium


 Chloride 30 meq/


 Potassium Acetate


 30 meq/Magnesium


 Sulfate 14 meq/


 Multivitamins 10


 ml/Chromium/


 Copper/Manganese/


 Seleni/Zn 1 ml/


 Insulin Human


 Regular 15 unit/


 Total Parenteral


 Nutrition/Amino


 Acids/Dextrose/


 Fat Emulsion


 Intravenous  1,920 ml @ 


 80 mls/hr  TPN  CONT  6/30/20 22:00


 7/1/20 21:59  7/1/20 01:22


80 MLS/HR


 


 Sodium Chloride


 90 meq/Calcium


 Gluconate 10 meq/


 Multivitamins 10


 ml/Chromium/


 Copper/Manganese/


 Seleni/Zn 0.5 ml/


 Total Parenteral


 Nutrition/Amino


 Acids/Dextrose/


 Fat Emulsion


 Intravenous  1,512 ml @ 


 63 mls/hr  TPN  CONT  3/18/20 22:00


 3/19/20 21:59 DC 3/18/20 22:06


63 MLS/HR


 


 Sodium Chloride


 90 meq/Calcium


 Gluconate 10 meq/


 Multivitamins 10


 ml/Chromium/


 Copper/Manganese/


 Seleni/Zn 1 ml/


 Total Parenteral


 Nutrition/Amino


 Acids/Dextrose/


 Fat Emulsion


 Intravenous  55.005 ml


  @ 2.292


 mls/hr  TPN  CONT  3/18/20 22:00


 3/18/20 12:33 DC  





 


 Sodium Chloride


 90 meq/Magnesium


 Sulfate 10 meq/


 Calcium Gluconate


 20 meq/


 Multivitamins 10


 ml/Chromium/


 Copper/Manganese/


 Seleni/Zn 0.5 ml/


 Total Parenteral


 Nutrition/Amino


 Acids/Dextrose/


 Fat Emulsion


 Intravenous  1,512 ml @ 


 63 mls/hr  TPN  CONT  3/19/20 22:00


 3/20/20 21:59 DC 3/19/20 22:25


63 MLS/HR


 


 Sodium Chloride


 90 meq/Magnesium


 Sulfate 12 meq/


 Calcium Gluconate


 15 meq/


 Multivitamins 10


 ml/Chromium/


 Copper/Manganese/


 Seleni/Zn 0.5 ml/


 Insulin Human


 Regular 25 unit/


 Total Parenteral


 Nutrition/Amino


 Acids/Dextrose/


 Fat Emulsion


 Intravenous  1,400 ml @ 


 58.333 mls/


 hr  TPN  CONT  4/8/20 22:00


 4/9/20 21:59 DC 4/8/20 21:41


58.333 MLS/HR


 


 Sodium Chloride


 90 meq/Potassium


 Chloride 15 meq/


 Magnesium Sulfate


 12 meq/Calcium


 Gluconate 15 meq/


 Multivitamins 10


 ml/Chromium/


 Copper/Manganese/


 Seleni/Zn 0.5 ml/


 Insulin Human


 Regular 25 unit/


 Total Parenteral


 Nutrition/Amino


 Acids/Dextrose/


 Fat Emulsion


 Intravenous  1,400 ml @ 


 58.333 mls/


 hr  TPN  CONT  4/7/20 22:00


 4/8/20 21:59 DC 4/7/20 22:13


58.333 MLS/HR


 


 Sodium Chloride


 90 meq/Potassium


 Chloride 15 meq/


 Potassium


 Phosphate 10 mmol/


 Magnesium Sulfate


 8 meq/Calcium


 Gluconate 15 meq/


 Multivitamins 10


 ml/Chromium/


 Copper/Manganese/


 Seleni/Zn 0.5 ml/


 Insulin Human


 Regular 25 unit/


 Total Parenteral


 Nutrition/Amino


 Acids/Dextrose/


 Fat Emulsion


 Intravenous  1,400 ml @ 


 58.333 mls/


 hr  TPN  CONT  4/5/20 22:00


 4/6/20 21:59 DC 4/5/20 21:20


58.333 MLS/HR


 


 Sodium Chloride


 90 meq/Potassium


 Chloride 15 meq/


 Potassium


 Phosphate 10 mmol/


 Magnesium Sulfate


 10 meq/Calcium


 Gluconate 20 meq/


 Multivitamins 10


 ml/Chromium/


 Copper/Manganese/


 Seleni/Zn 0.5 ml/


 Total Parenteral


 Nutrition/Amino


 Acids/Dextrose/


 Fat Emulsion


 Intravenous  1,400 ml @ 


 58.333 mls/


 hr  TPN  CONT  3/23/20 22:00


 3/24/20 21:59 DC 3/23/20 21:42


58.333 MLS/HR


 


 Sodium Chloride


 90 meq/Potassium


 Chloride 15 meq/


 Potassium


 Phosphate 10 mmol/


 Magnesium Sulfate


 12 meq/Calcium


 Gluconate 15 meq/


 Multivitamins 10


 ml/Chromium/


 Copper/Manganese/


 Seleni/Zn 0.5 ml/


 Insulin Human


 Regular 25 unit/


 Total Parenteral


 Nutrition/Amino


 Acids/Dextrose/


 Fat Emulsion


 Intravenous  1,400 ml @ 


 58.333 mls/


 hr  TPN  CONT  4/6/20 22:00


 4/7/20 21:59 DC 4/6/20 22:24


58.333 MLS/HR


 


 Sodium Chloride


 90 meq/Potassium


 Chloride 15 meq/


 Potassium


 Phosphate 15 mmol/


 Magnesium Sulfate


 10 meq/Calcium


 Gluconate 15 meq/


 Multivitamins 10


 ml/Chromium/


 Copper/Manganese/


 Seleni/Zn 0.5 ml/


 Total Parenteral


 Nutrition/Amino


 Acids/Dextrose/


 Fat Emulsion


 Intravenous  1,400 ml @ 


 58.333 mls/


 hr  TPN  CONT  3/24/20 22:00


 3/25/20 21:59 DC 3/24/20 22:17


58.333 MLS/HR


 


 Sodium Chloride


 90 meq/Potassium


 Chloride 15 meq/


 Potassium


 Phosphate 15 mmol/


 Magnesium Sulfate


 10 meq/Calcium


 Gluconate 20 meq/


 Multivitamins 10


 ml/Chromium/


 Copper/Manganese/


 Seleni/Zn 0.5 ml/


 Total Parenteral


 Nutrition/Amino


 Acids/Dextrose/


 Fat Emulsion


 Intravenous  1,200 ml @ 


 50 mls/hr  TPN  CONT  3/22/20 22:00


 3/22/20 14:17 DC  





 


 Sodium Chloride


 90 meq/Potassium


 Chloride 15 meq/


 Potassium


 Phosphate 18 mmol/


 Magnesium Sulfate


 8 meq/Calcium


 Gluconate 15 meq/


 Multivitamins 10


 ml/Chromium/


 Copper/Manganese/


 Seleni/Zn 0.5 ml/


 Insulin Human


 Regular 10 unit/


 Total Parenteral


 Nutrition/Amino


 Acids/Dextrose/


 Fat Emulsion


 Intravenous  1,400 ml @ 


 58.333 mls/


 hr  TPN  CONT  3/27/20 22:00


 3/28/20 21:59 DC 3/27/20 21:43


58.333 MLS/HR


 


 Sodium Chloride


 90 meq/Potassium


 Chloride 15 meq/


 Potassium


 Phosphate 18 mmol/


 Magnesium Sulfate


 8 meq/Calcium


 Gluconate 15 meq/


 Multivitamins 10


 ml/Chromium/


 Copper/Manganese/


 Seleni/Zn 0.5 ml/


 Insulin Human


 Regular 15 unit/


 Total Parenteral


 Nutrition/Amino


 Acids/Dextrose/


 Fat Emulsion


 Intravenous  1,400 ml @ 


 58.333 mls/


 hr  TPN  CONT  3/30/20 22:00


 3/31/20 21:59 DC 3/30/20 21:47


58.333 MLS/HR


 


 Sodium Chloride


 90 meq/Potassium


 Chloride 15 meq/


 Potassium


 Phosphate 18 mmol/


 Magnesium Sulfate


 8 meq/Calcium


 Gluconate 15 meq/


 Multivitamins 10


 ml/Chromium/


 Copper/Manganese/


 Seleni/Zn 0.5 ml/


 Insulin Human


 Regular 20 unit/


 Total Parenteral


 Nutrition/Amino


 Acids/Dextrose/


 Fat Emulsion


 Intravenous  1,400 ml @ 


 58.333 mls/


 hr  TPN  CONT  4/2/20 22:00


 4/3/20 21:59 DC 4/2/20 22:45


58.333 MLS/HR


 


 Sodium Chloride


 90 meq/Potassium


 Chloride 15 meq/


 Potassium


 Phosphate 18 mmol/


 Magnesium Sulfate


 8 meq/Calcium


 Gluconate 15 meq/


 Multivitamins 10


 ml/Chromium/


 Copper/Manganese/


 Seleni/Zn 0.5 ml/


 Total Parenteral


 Nutrition/Amino


 Acids/Dextrose/


 Fat Emulsion


 Intravenous  1,400 ml @ 


 58.333 mls/


 hr  TPN  CONT  3/26/20 22:00


 3/27/20 21:59 DC 3/26/20 22:00


58.333 MLS/HR


 


 Sodium Chloride


 90 meq/Potassium


 Phosphate 15 mmol/


 Magnesium Sulfate


 12 meq/Calcium


 Gluconate 15 meq/


 Multivitamins 10


 ml/Chromium/


 Copper/Manganese/


 Seleni/Zn 0.5 ml/


 Insulin Human


 Regular 30 unit/


 Total Parenteral


 Nutrition/Amino


 Acids/Dextrose/


 Fat Emulsion


 Intravenous  1,400 ml @ 


 58.333 mls/


 hr  TPN  CONT  4/10/20 22:00


 4/11/20 21:59 DC 4/10/20 21:49


58.333 MLS/HR


 


 Sodium Chloride


 90 meq/Potassium


 Phosphate 15 mmol/


 Magnesium Sulfate


 12 meq/Calcium


 Gluconate 15 meq/


 Multivitamins 10


 ml/Chromium/


 Copper/Manganese/


 Seleni/Zn 0.5 ml/


 Insulin Human


 Regular 40 unit/


 Total Parenteral


 Nutrition/Amino


 Acids/Dextrose/


 Fat Emulsion


 Intravenous  1,400 ml @ 


 58.333 mls/


 hr  TPN  CONT  4/11/20 22:00


 4/12/20 21:59 DC 4/11/20 21:21


58.333 MLS/HR


 


 Sodium Chloride


 90 meq/Potassium


 Phosphate 19 mmol/


 Magnesium Sulfate


 12 meq/Calcium


 Gluconate 15 meq/


 Multivitamins 10


 ml/Chromium/


 Copper/Manganese/


 Seleni/Zn 0.5 ml/


 Insulin Human


 Regular 40 unit/


 Total Parenteral


 Nutrition/Amino


 Acids/Dextrose/


 Fat Emulsion


 Intravenous  1,400 ml @ 


 58.333 mls/


 hr  TPN  CONT  4/12/20 22:00


 4/13/20 21:59 DC 4/12/20 21:54


58.333 MLS/HR


 


 Sodium Chloride


 90 meq/Potassium


 Phosphate 5 mmol/


 Magnesium Sulfate


 12 meq/Calcium


 Gluconate 15 meq/


 Multivitamins 10


 ml/Chromium/


 Copper/Manganese/


 Seleni/Zn 0.5 ml/


 Insulin Human


 Regular 30 unit/


 Total Parenteral


 Nutrition/Amino


 Acids/Dextrose/


 Fat Emulsion


 Intravenous  1,400 ml @ 


 58.333 mls/


 hr  TPN  CONT  4/9/20 22:00


 4/10/20 21:59 DC 4/9/20 22:08


58.333 MLS/HR


 


 Sodium Chloride


 100 meq/Potassium


 Chloride 40 meq/


 Magnesium Sulfate


 15 meq/Calcium


 Gluconate 15 meq/


 Multivitamins 10


 ml/Chromium/


 Copper/Manganese/


 Seleni/Zn 0.5 ml/


 Insulin Human


 Regular 35 unit/


 Total Parenteral


 Nutrition/Amino


 Acids/Dextrose/


 Fat Emulsion


 Intravenous  1,400 ml @ 


 58.333 mls/


 hr  TPN  CONT  4/19/20 22:00


 4/20/20 21:59 DC 4/19/20 22:46


58.333 MLS/HR


 


 Sodium Chloride


 100 meq/Potassium


 Chloride 40 meq/


 Magnesium Sulfate


 20 meq/Calcium


 Gluconate 10 meq/


 Multivitamins 10


 ml/Chromium/


 Copper/Manganese/


 Seleni/Zn 0.5 ml/


 Insulin Human


 Regular 35 unit/


 Total Parenteral


 Nutrition/Amino


 Acids/Dextrose/


 Fat Emulsion


 Intravenous  1,400 ml @ 


 58.333 mls/


 hr  TPN  CONT  4/23/20 22:00


 4/24/20 21:59 DC 4/24/20 00:06


58.333 MLS/HR


 


 Sodium Chloride


 100 meq/Potassium


 Chloride 40 meq/


 Magnesium Sulfate


 20 meq/Calcium


 Gluconate 15 meq/


 Multivitamins 10


 ml/Chromium/


 Copper/Manganese/


 Seleni/Zn 0.5 ml/


 Insulin Human


 Regular 35 unit/


 Total Parenteral


 Nutrition/Amino


 Acids/Dextrose/


 Fat Emulsion


 Intravenous  1,400 ml @ 


 58.333 mls/


 hr  TPN  CONT  4/22/20 22:00


 4/23/20 21:59 DC 4/22/20 22:27


58.333 MLS/HR


 


 Sodium Chloride


 100 meq/Potassium


 Phosphate 10 mmol/


 Magnesium Sulfate


 12 meq/Calcium


 Gluconate 15 meq/


 Multivitamins 10


 ml/Chromium/


 Copper/Manganese/


 Seleni/Zn 0.5 ml/


 Insulin Human


 Regular 35 unit/


 Potassium


 Chloride 20 meq/


 Total Parenteral


 Nutrition/Amino


 Acids/Dextrose/


 Fat Emulsion


 Intravenous  1,400 ml @ 


 58.333 mls/


 hr  TPN  CONT  4/16/20 22:00


 4/17/20 21:59 DC 4/16/20 22:10


58.333 MLS/HR


 


 Sodium Chloride


 100 meq/Potassium


 Phosphate 19 mmol/


 Magnesium Sulfate


 12 meq/Calcium


 Gluconate 15 meq/


 Multivitamins 10


 ml/Chromium/


 Copper/Manganese/


 Seleni/Zn 0.5 ml/


 Insulin Human


 Regular 40 unit/


 Potassium


 Chloride 20 meq/


 Total Parenteral


 Nutrition/Amino


 Acids/Dextrose/


 Fat Emulsion


 Intravenous  1,400 ml @ 


 58.333 mls/


 hr  TPN  CONT  4/15/20 22:00


 4/16/20 21:59 DC 4/15/20 21:20


58.333 MLS/HR


 


 Sodium Chloride


 100 meq/Potassium


 Phosphate 5 mmol/


 Magnesium Sulfate


 12 meq/Calcium


 Gluconate 15 meq/


 Multivitamins 10


 ml/Chromium/


 Copper/Manganese/


 Seleni/Zn 0.5 ml/


 Insulin Human


 Regular 35 unit/


 Potassium


 Chloride 20 meq/


 Total Parenteral


 Nutrition/Amino


 Acids/Dextrose/


 Fat Emulsion


 Intravenous  1,400 ml @ 


 58.333 mls/


 hr  TPN  CONT  4/17/20 22:00


 4/18/20 21:59 DC 4/17/20 22:59


58.333 MLS/HR


 


 Succinylcholine


 Chloride


  (Anectine)  120 mg  1X  ONCE  3/23/20 08:30


 3/23/20 08:31 DC 3/23/20 08:34


120 MG


 


 Vasopressin


  (Vasostrict)  20 unit  STK-MED ONCE  6/30/20 12:23


 6/30/20 12:23 DC  





 


 Vasopressin 20


 unit/Dextrose  101 ml @ 


 12 mls/hr  CONT  PRN  6/30/20 15:30


    6/30/20 21:49


12 MLS/HR


 


 Vecuronium Bromide


  (Norcuron Bolus)  6 mg  PRN Q6HRS  PRN  5/7/20 19:15


 5/7/20 19:35 DC  














Labs:


Lab





Laboratory Tests








Test


 6/30/20


09:52 6/30/20


15:30 6/30/20


16:10 6/30/20


21:30


 


Glucose (Fingerstick)


 105 mg/dL


(70-99) 


 


 





 


White Blood Count


 


 49.3 x10^3/uL


(4.0-11.0) 


 21.5 x10^3/uL


(4.0-11.0)


 


Red Blood Count


 


 2.36 x10^6/uL


(3.50-5.40) 


 2.77 x10^6/uL


(3.50-5.40)


 


Hemoglobin


 


 6.7 g/dL


(12.0-15.5) 


 8.2 g/dL


(12.0-15.5)


 


Hematocrit


 


 20.9 %


(36.0-47.0) 


 23.1 %


(36.0-47.0)


 


Mean Corpuscular Volume  89 fL ()   84 fL () 


 


Mean Corpuscular Hemoglobin  28 pg (25-35)   30 pg (25-35) 


 


Mean Corpuscular Hemoglobin


Concent 


 32 g/dL


(31-37) 


 36 g/dL


(31-37)


 


Red Cell Distribution Width


 


 16.7 %


(11.5-14.5) 


 14.8 %


(11.5-14.5)


 


Platelet Count


 


 335 x10^3/uL


(140-400) 


 211 x10^3/uL


(140-400)


 


Sodium Level


 


 138 mmol/L


(136-145) 


 





 


Potassium Level


 


 4.3 mmol/L


(3.5-5.1) 


 





 


Chloride Level


 


 107 mmol/L


() 


 





 


Carbon Dioxide Level


 


 15 mmol/L


(21-32) 


 





 


Anion Gap  16 (6-14)   


 


Blood Urea Nitrogen


 


 11 mg/dL


(7-20) 


 





 


Creatinine


 


 0.7 mg/dL


(0.6-1.0) 


 





 


Estimated GFR


(Cockcroft-Gault) 


 88.9 


 


 





 


Glucose Level


 


 169 mg/dL


(70-99) 


 





 


Calcium Level


 


 7.5 mg/dL


(8.5-10.1) 


 





 


Magnesium Level


 


 1.4 mg/dL


(1.8-2.4) 


 





 


O2 Saturation   99 % (92-99)  


 


Arterial Blood pH


 


 


 7.25


(7.35-7.45) 





 


Arterial Blood pCO2 at


Patient Temp 


 


 32 mmHg


(35-46) 





 


Arterial Blood pO2 at Patient


Temp 


 


 374 mmHg


() 





 


Arterial Blood HCO3


 


 


 13 mmol/L


(21-28) 





 


Arterial Blood Base Excess


 


 


 -13 mmol/L


(-3-3) 





 


FiO2   80%+5  


 


Test


 7/1/20


06:00 7/1/20


06:06 


 





 


White Blood Count


 18.9 x10^3/uL


(4.0-11.0) 


 


 





 


Red Blood Count


 2.55 x10^6/uL


(3.50-5.40) 


 


 





 


Hemoglobin


 7.5 g/dL


(12.0-15.5) 


 


 





 


Hematocrit


 21.6 %


(36.0-47.0) 


 


 





 


Mean Corpuscular Volume 85 fL ()    


 


Mean Corpuscular Hemoglobin 29 pg (25-35)    


 


Mean Corpuscular Hemoglobin


Concent 35 g/dL


(31-37) 


 


 





 


Red Cell Distribution Width


 14.8 %


(11.5-14.5) 


 


 





 


Platelet Count


 253 x10^3/uL


(140-400) 


 


 





 


Neutrophils (%) (Auto) 85 % (31-73)    


 


Lymphocytes (%) (Auto) 8 % (24-48)    


 


Monocytes (%) (Auto) 6 % (0-9)    


 


Eosinophils (%) (Auto) 0 % (0-3)    


 


Basophils (%) (Auto) 0 % (0-3)    


 


Neutrophils # (Auto)


 16.1 x10^3/uL


(1.8-7.7) 


 


 





 


Lymphocytes # (Auto)


 1.5 x10^3/uL


(1.0-4.8) 


 


 





 


Monocytes # (Auto)


 1.2 x10^3/uL


(0.0-1.1) 


 


 





 


Eosinophils # (Auto)


 0.0 x10^3/uL


(0.0-0.7) 


 


 





 


Basophils # (Auto)


 0.1 x10^3/uL


(0.0-0.2) 


 


 





 


Sodium Level


 138 mmol/L


(136-145) 


 


 





 


Potassium Level


 3.9 mmol/L


(3.5-5.1) 


 


 





 


Chloride Level


 104 mmol/L


() 


 


 





 


Carbon Dioxide Level


 26 mmol/L


(21-32) 


 


 





 


Anion Gap 8 (6-14)    


 


Blood Urea Nitrogen


 19 mg/dL


(7-20) 


 


 





 


Creatinine


 0.8 mg/dL


(0.6-1.0) 


 


 





 


Estimated GFR


(Cockcroft-Gault) 76.2 


 


 


 





 


Glucose Level


 264 mg/dL


(70-99) 


 


 





 


Calcium Level


 9.2 mg/dL


(8.5-10.1) 


 


 





 


Phosphorus Level


 3.5 mg/dL


(2.6-4.7) 


 


 





 


Magnesium Level


 2.1 mg/dL


(1.8-2.4) 


 


 





 


Glucose (Fingerstick)


 


 249 mg/dL


(70-99) 


 











Micro


        NEG ANSON 56


        PSEUDOMONAS AERUGINOSA


        ANTIBIOTIC                        RESULT          INTERPRETATION


        AMIKACIN                          <=16                  S


        AZTREONAM                         >16                   R


        CEFTAZIDIME                       >16                   R


        CIPROFLOXACIN                     <=0.25                S


        CEFEPIME                          16                    I


        GENTAMICIN                        <=2                   S


        LEVOFLOXACIN                      <=0.5                 S














                               ** CONTINUED ON NEXT PAGE **





 

--------------------------------------------------------------------------------


------------





RUN DATE: 06/10/20                  [facility] *LIVE*               

  PAGE 2   


RUN TIME: 1121                            Specimen Inquiry                    


 

--------------------------------------------------------------------------------


------------





SPEC: [id number]    PATIENT: JESENIA BEAN                [id number]  

(Continued)


-------------------------------------------------------------

-------------------------------








 

--------------------------------------------------------------------------------


------------





  Procedure                         Result                                      

         


-------------------------------------

-------------------------------------------------------





  ANTIMICROBIAL SUSCEPTIBILITY  Preliminary   (continued)


        MEROPENEM                         <=1                   S


        PIPERACILLIN/TAZOBACTAM           64                    S


        TOBRAMYCIN                        <=2                   S


        Unless otherwise specified, Testing Performed by:


        [address] [address]


        For Inquires, the Physician may contact the Microbiology


        department at [phone]





--------------------------------------------------------------------------

------------------





Objective:


Assessment:


Patient with prolonged hospitalization more than 3 months


Multiple medical problems


Multiple surgical procedures





Pt underwent 


  Exploratory laparotomy, lysis of adhesions, subtotal cholecystectomy with 

cholangiogram, gastrojejunostomy tube placement, pancreatic necrosectomy June 30


Leucocytosis Leukomoid reaction likely postoperative


Fever intermittently source likely GI


Acute pancreatitis with persistent  necrosis


CT a/p 4/9


    Increased ascites. Persistent evidence of necrotizing pancreatitis with 

fluid and phlegmon


   at the pancreas


   4/27 status post ROBERT drain placement; yeast


   5/6 fluid  candida parapsilosis fluid amylase high


CT 6/7





1.   Sequela of pancreatitis with extensive pseudocysts again 


demonstrated, the right-sided collections are slightly larger since the 


prior exam, the left-sided collections are stable. 


6/7 fluid cult PSAE (MDRO),yeast; treated





Cholelithiasis with thickening of the gallbladder wall.


Leucocytosis 


JED,Hyperkalemia, Metabolic acidosis off dialysis


Acute hypoxic resp failure ,bilateral pleural effusion and atelectasis


hypocalcemia 


Prediabetes


HTN


s/p trach


Pleural effusion status post CTS left side


 


Abdominal fluid culture 6 /7 MDRO Pseudomonas, yeast





Plan:


Plan of Care


cont dapto , merrem ,micafungin


Monitor labs/cults


wound care /drain management as directed


Supportive care


Contact isolation for CRE/MDRO


Critically ill


Discussed with nursing staff











IVAN FRANZ MD            Jul 1, 2020 08:08

## 2020-07-01 NOTE — NUR
Pharmacy TPN Dosing Note



S: JESENIA BEAN is a 49 year old F Currently receiving Central Continuous TPN started 
03/18/20



B:Pertinent PMH: 

Necrotizing pancreatitis

Height: 5 feet, 8 inches

Weight: 86.562073 kg



Current diet: NPO 



LABS:

Sodium:    138 

Potassium: 3.9 

Chloride:  104 

Calcium:   9.2 

Corrected Calcium: 11.52 

Magnesium: 2.1 

CO2:       34 

SCr:       0.5 

Glucose:   235 

Albumin:   1.1 

AST:       18 

ALT:       12 



TPN FORMULA:

TPN TYPE:  Central Continuous

AMINO ACIDS:         80 gm

DEXTROSE:            250 gm

LIPIDS:              20 gm

SODIUM CHLORIDE:     80 mEq

SODIUM ACETATE:      - mEq

SODIUM PHOSPHATE:    - mmol

POTASSIUM CHLORIDE:  30 mEq

POTASSIUM ACETATE:   30 mEq

POTASSIUM PHOSPHATE: - mmol

MAGNESIUM:           14 mEq

CALCIUM:             - mEq

INSULIN:             15 units

MULTIPLE VITAMIN:    10 ml

TRACE ELEMENTS:      1 ml(s)



TPN PLAN:  

Continue same TPN til tf at goal.





R: Continue TPN at 80 ml/hr tonight

Will monitor electrolytes, glucose, and tolerance to TPN.



 YENIFER STREETER [facility], 07/01/20 1602

## 2020-07-01 NOTE — PDOC
Objective:


Objective:


D/w nurse - on pressors.


S/p transfusions.


Vital Signs:





                                   Vital Signs








  Date Time  Temp Pulse Resp B/P (MAP) Pulse Ox O2 Delivery O2 Flow Rate FiO2


 


7/1/20 11:09   12  99 Ventilator  


 


7/1/20 11:00  112  90/48 (62)    


 


7/1/20 09:25       40.0 


 


7/1/20 04:00 99.8       





 99.8       








Labs:





Laboratory Tests








Test


 6/30/20


15:30 6/30/20


16:10 6/30/20


21:30 7/1/20


06:00


 


White Blood Count 49.3 x10^3/uL   21.5 x10^3/uL  18.9 x10^3/uL 


 


Red Blood Count 2.36 x10^6/uL   2.77 x10^6/uL  2.55 x10^6/uL 


 


Hemoglobin 6.7 g/dL   8.2 g/dL  7.5 g/dL 


 


Hematocrit 20.9 %   23.1 %  21.6 % 


 


Mean Corpuscular Volume 89 fL   84 fL  85 fL 


 


Mean Corpuscular Hemoglobin 28 pg   30 pg  29 pg 


 


Mean Corpuscular Hemoglobin


Concent 32 g/dL 


 


 36 g/dL 


 35 g/dL 





 


Red Cell Distribution Width 16.7 %   14.8 %  14.8 % 


 


Platelet Count 335 x10^3/uL   211 x10^3/uL  253 x10^3/uL 


 


Sodium Level 138 mmol/L    138 mmol/L 


 


Potassium Level 4.3 mmol/L    3.9 mmol/L 


 


Chloride Level 107 mmol/L    104 mmol/L 


 


Carbon Dioxide Level 15 mmol/L    26 mmol/L 


 


Anion Gap 16    8 


 


Blood Urea Nitrogen 11 mg/dL    19 mg/dL 


 


Creatinine 0.7 mg/dL    0.8 mg/dL 


 


Estimated GFR


(Cockcroft-Gault) 88.9 


 


 


 76.2 





 


Glucose Level 169 mg/dL    264 mg/dL 


 


Calcium Level 7.5 mg/dL    9.2 mg/dL 


 


Magnesium Level 1.4 mg/dL    2.1 mg/dL 


 


O2 Saturation  99 %   


 


Arterial Blood pH  7.25   


 


Arterial Blood pCO2 at


Patient Temp 


 32 mmHg 


 


 





 


Arterial Blood pO2 at Patient


Temp 


 374 mmHg 


 


 





 


Arterial Blood HCO3  13 mmol/L   


 


Arterial Blood Base Excess  -13 mmol/L   


 


FiO2  80%+5   


 


Neutrophils (%) (Auto)    85 % 


 


Lymphocytes (%) (Auto)    8 % 


 


Monocytes (%) (Auto)    6 % 


 


Eosinophils (%) (Auto)    0 % 


 


Basophils (%) (Auto)    0 % 


 


Neutrophils # (Auto)    16.1 x10^3/uL 


 


Lymphocytes # (Auto)    1.5 x10^3/uL 


 


Monocytes # (Auto)    1.2 x10^3/uL 


 


Eosinophils # (Auto)    0.0 x10^3/uL 


 


Basophils # (Auto)    0.1 x10^3/uL 


 


Phosphorus Level    3.5 mg/dL 


 


Test


 7/1/20


06:06 7/1/20


08:30 


 





 


Glucose (Fingerstick) 249 mg/dL    


 


O2 Saturation  98 %   


 


Arterial Blood pH  7.60   


 


Arterial Blood pCO2 at


Patient Temp 


 22 mmHg 


 


 





 


Arterial Blood pO2 at Patient


Temp 


 120 mmHg 


 


 





 


Arterial Blood HCO3  21 mmol/L   


 


Arterial Blood Base Excess  0 mmol/L   


 


FiO2  40   








Imaging:


KUB


IMPRESSION:


Postoperative changes as described above.





Cholangiogram


IMPRESSION:


1. No evidence of retained stones.





PE:





LUNGS: trach/vent


HEART: tachycardic


ABD: multiple drains


NEURO/PSYCH: sedated





A/P:


Severe gallstone pancreatitis s/p expl laparotomy, REN, subtotal 

cholecystectomy, gastrojejunostomy placement, pancreatic necrosectomy





--


Continue support.





Justicifation of Admission Dx:


Justifications for Admission:


Justification of Admission Dx:  Yes











RAGHAVENDRA MURCIA          Jul 1, 2020 11:37

## 2020-07-01 NOTE — NUR
SS following up with discharge planning. SS reviewed pt chart and discussed with pt RN. Pt 
had surgery, 6/30/2020. Pt currently on vent with NG tube and J tube. Pt has chest tube, ROBERT 
drains x3, Avi drains x2. Pt on TPN and IV Micafungin, Daptomycin, and Meropenem. Pt 
received two units of blood after surgery. SS will continue to follow for discharge 
planning.

## 2020-07-01 NOTE — NUR
Wound Care

Incisional wound vac paced over midline incision with penrose per Dr Flores. Periwound 
draped, contact layer placed over incision line with silver foam at 125 mmHg continuous 
suction. All drain sponges changed on surrounding drains. Pt has old drain site on proximal 
right side of incision, this too was covered with contact layer and silver foam and placed 
in vac. No other wounds noted on skin inspection. Pt is very edematous. Pt on ICU bed, 
turned to left side with heels floated and all drain tubes adjusted. WC will continue to 
follow for possible changes.

## 2020-07-02 VITALS — SYSTOLIC BLOOD PRESSURE: 97 MMHG | DIASTOLIC BLOOD PRESSURE: 56 MMHG

## 2020-07-02 VITALS — DIASTOLIC BLOOD PRESSURE: 80 MMHG | SYSTOLIC BLOOD PRESSURE: 109 MMHG

## 2020-07-02 VITALS — DIASTOLIC BLOOD PRESSURE: 76 MMHG | SYSTOLIC BLOOD PRESSURE: 88 MMHG

## 2020-07-02 VITALS — DIASTOLIC BLOOD PRESSURE: 58 MMHG | SYSTOLIC BLOOD PRESSURE: 113 MMHG

## 2020-07-02 VITALS — SYSTOLIC BLOOD PRESSURE: 94 MMHG | DIASTOLIC BLOOD PRESSURE: 48 MMHG

## 2020-07-02 VITALS — SYSTOLIC BLOOD PRESSURE: 110 MMHG | DIASTOLIC BLOOD PRESSURE: 67 MMHG

## 2020-07-02 VITALS — SYSTOLIC BLOOD PRESSURE: 92 MMHG | DIASTOLIC BLOOD PRESSURE: 62 MMHG

## 2020-07-02 VITALS — SYSTOLIC BLOOD PRESSURE: 107 MMHG | DIASTOLIC BLOOD PRESSURE: 74 MMHG

## 2020-07-02 VITALS — SYSTOLIC BLOOD PRESSURE: 109 MMHG | DIASTOLIC BLOOD PRESSURE: 53 MMHG

## 2020-07-02 VITALS — DIASTOLIC BLOOD PRESSURE: 74 MMHG | SYSTOLIC BLOOD PRESSURE: 136 MMHG

## 2020-07-02 VITALS — DIASTOLIC BLOOD PRESSURE: 60 MMHG | SYSTOLIC BLOOD PRESSURE: 130 MMHG

## 2020-07-02 VITALS — DIASTOLIC BLOOD PRESSURE: 88 MMHG | SYSTOLIC BLOOD PRESSURE: 156 MMHG

## 2020-07-02 VITALS — DIASTOLIC BLOOD PRESSURE: 91 MMHG | SYSTOLIC BLOOD PRESSURE: 157 MMHG

## 2020-07-02 VITALS — SYSTOLIC BLOOD PRESSURE: 96 MMHG | DIASTOLIC BLOOD PRESSURE: 56 MMHG

## 2020-07-02 VITALS — DIASTOLIC BLOOD PRESSURE: 62 MMHG | SYSTOLIC BLOOD PRESSURE: 134 MMHG

## 2020-07-02 VITALS — DIASTOLIC BLOOD PRESSURE: 71 MMHG | SYSTOLIC BLOOD PRESSURE: 105 MMHG

## 2020-07-02 VITALS — SYSTOLIC BLOOD PRESSURE: 106 MMHG | DIASTOLIC BLOOD PRESSURE: 62 MMHG

## 2020-07-02 VITALS — DIASTOLIC BLOOD PRESSURE: 72 MMHG | SYSTOLIC BLOOD PRESSURE: 123 MMHG

## 2020-07-02 VITALS — DIASTOLIC BLOOD PRESSURE: 56 MMHG | SYSTOLIC BLOOD PRESSURE: 104 MMHG

## 2020-07-02 VITALS — SYSTOLIC BLOOD PRESSURE: 155 MMHG | DIASTOLIC BLOOD PRESSURE: 99 MMHG

## 2020-07-02 VITALS — DIASTOLIC BLOOD PRESSURE: 62 MMHG | SYSTOLIC BLOOD PRESSURE: 120 MMHG

## 2020-07-02 VITALS — SYSTOLIC BLOOD PRESSURE: 113 MMHG | DIASTOLIC BLOOD PRESSURE: 68 MMHG

## 2020-07-02 VITALS — DIASTOLIC BLOOD PRESSURE: 77 MMHG | SYSTOLIC BLOOD PRESSURE: 128 MMHG

## 2020-07-02 VITALS — DIASTOLIC BLOOD PRESSURE: 80 MMHG | SYSTOLIC BLOOD PRESSURE: 122 MMHG

## 2020-07-02 LAB
ANION GAP SERPL CALC-SCNC: 3 MMOL/L (ref 6–14)
BASE EXCESS ABG: -2 MMOL/L (ref -3–3)
BUN SERPL-MCNC: 24 MG/DL (ref 7–20)
CALCIUM SERPL-MCNC: 8.9 MG/DL (ref 8.5–10.1)
CHLORIDE SERPL-SCNC: 103 MMOL/L (ref 98–107)
CO2 SERPL-SCNC: 28 MMOL/L (ref 21–32)
CREAT SERPL-MCNC: 0.7 MG/DL (ref 0.6–1)
ERYTHROCYTE [DISTWIDTH] IN BLOOD BY AUTOMATED COUNT: 14.8 % (ref 11.5–14.5)
GFR SERPLBLD BASED ON 1.73 SQ M-ARVRAT: 88.9 ML/MIN
GLUCOSE SERPL-MCNC: 178 MG/DL (ref 70–99)
HCO3 BLDA-SCNC: 23 MMOL/L (ref 21–28)
HCT VFR BLD CALC: 26.8 % (ref 36–47)
HGB BLD-MCNC: 9 G/DL (ref 12–15.5)
INSPIRATION SETTING TIME VENT: 40
MAGNESIUM SERPL-MCNC: 2.1 MG/DL (ref 1.8–2.4)
MCH RBC QN AUTO: 29 PG (ref 25–35)
MCHC RBC AUTO-ENTMCNC: 34 G/DL (ref 31–37)
MCV RBC AUTO: 88 FL (ref 79–100)
PCO2 BLDA: 41 MMHG (ref 35–46)
PLATELET # BLD AUTO: 278 X10^3/UL (ref 140–400)
PO2 BLDA: 127 MMHG (ref 75–108)
POTASSIUM SERPL-SCNC: 4 MMOL/L (ref 3.5–5.1)
RBC # BLD AUTO: 3.06 X10^6/UL (ref 3.5–5.4)
SAO2 % BLDA: 98 % (ref 92–99)
SODIUM SERPL-SCNC: 134 MMOL/L (ref 136–145)
WBC # BLD AUTO: 38.3 X10^3/UL (ref 4–11)

## 2020-07-02 RX ADMIN — DILTIAZEM HYDROCHLORIDE PRN MLS/HR: 5 INJECTION INTRAVENOUS at 21:38

## 2020-07-02 RX ADMIN — ACETYLCYSTEINE SCH MG: 200 INHALANT RESPIRATORY (INHALATION) at 20:17

## 2020-07-02 RX ADMIN — IPRATROPIUM BROMIDE AND ALBUTEROL SULFATE SCH ML: .5; 3 SOLUTION RESPIRATORY (INHALATION) at 04:30

## 2020-07-02 RX ADMIN — Medication PRN EACH: at 11:40

## 2020-07-02 RX ADMIN — MEROPENEM SCH MLS/HR: 500 INJECTION, POWDER, FOR SOLUTION INTRAVENOUS at 12:31

## 2020-07-02 RX ADMIN — AMIODARONE HYDROCHLORIDE PRN MLS/HR: 50 INJECTION, SOLUTION INTRAVENOUS at 09:09

## 2020-07-02 RX ADMIN — INSULIN LISPRO SCH UNITS: 100 INJECTION, SOLUTION INTRAVENOUS; SUBCUTANEOUS at 06:27

## 2020-07-02 RX ADMIN — DILTIAZEM HYDROCHLORIDE PRN MLS/HR: 5 INJECTION INTRAVENOUS at 02:21

## 2020-07-02 RX ADMIN — INSULIN LISPRO SCH UNITS: 100 INJECTION, SOLUTION INTRAVENOUS; SUBCUTANEOUS at 00:23

## 2020-07-02 RX ADMIN — ACETYLCYSTEINE SCH MG: 200 INHALANT RESPIRATORY (INHALATION) at 08:00

## 2020-07-02 RX ADMIN — MEROPENEM SCH MLS/HR: 500 INJECTION, POWDER, FOR SOLUTION INTRAVENOUS at 18:08

## 2020-07-02 RX ADMIN — PANTOPRAZOLE SODIUM SCH MG: 40 INJECTION, POWDER, FOR SOLUTION INTRAVENOUS at 08:51

## 2020-07-02 RX ADMIN — DAPTOMYCIN SCH MLS/HR: 500 INJECTION, POWDER, LYOPHILIZED, FOR SOLUTION INTRAVENOUS at 21:51

## 2020-07-02 RX ADMIN — Medication PRN EACH: at 11:39

## 2020-07-02 RX ADMIN — DEXTROSE PRN MLS/HR: 50 INJECTION, SOLUTION INTRAVENOUS at 12:31

## 2020-07-02 RX ADMIN — IPRATROPIUM BROMIDE AND ALBUTEROL SULFATE SCH ML: .5; 3 SOLUTION RESPIRATORY (INHALATION) at 08:34

## 2020-07-02 RX ADMIN — INSULIN LISPRO SCH UNITS: 100 INJECTION, SOLUTION INTRAVENOUS; SUBCUTANEOUS at 18:11

## 2020-07-02 RX ADMIN — IPRATROPIUM BROMIDE AND ALBUTEROL SULFATE SCH ML: .5; 3 SOLUTION RESPIRATORY (INHALATION) at 11:35

## 2020-07-02 RX ADMIN — DEXTROSE SCH MLS/HR: 50 INJECTION, SOLUTION INTRAVENOUS at 08:52

## 2020-07-02 RX ADMIN — MEROPENEM SCH MLS/HR: 500 INJECTION, POWDER, FOR SOLUTION INTRAVENOUS at 05:51

## 2020-07-02 RX ADMIN — DEXTROSE PRN MLS/HR: 50 INJECTION, SOLUTION INTRAVENOUS at 02:21

## 2020-07-02 RX ADMIN — DEXTROSE PRN MLS/HR: 50 INJECTION, SOLUTION INTRAVENOUS at 21:38

## 2020-07-02 RX ADMIN — BACITRACIN SCH MLS/HR: 5000 INJECTION, POWDER, FOR SOLUTION INTRAMUSCULAR at 18:08

## 2020-07-02 RX ADMIN — DILTIAZEM HYDROCHLORIDE PRN MLS/HR: 5 INJECTION INTRAVENOUS at 12:31

## 2020-07-02 RX ADMIN — IPRATROPIUM BROMIDE AND ALBUTEROL SULFATE SCH ML: .5; 3 SOLUTION RESPIRATORY (INHALATION) at 20:17

## 2020-07-02 RX ADMIN — INSULIN LISPRO SCH UNITS: 100 INJECTION, SOLUTION INTRAVENOUS; SUBCUTANEOUS at 12:44

## 2020-07-02 RX ADMIN — IPRATROPIUM BROMIDE AND ALBUTEROL SULFATE SCH ML: .5; 3 SOLUTION RESPIRATORY (INHALATION) at 15:48

## 2020-07-02 RX ADMIN — ENOXAPARIN SODIUM SCH MG: 40 INJECTION SUBCUTANEOUS at 08:00

## 2020-07-02 NOTE — PDOC
Objective:


Objective:


Reviewed w/ nurse - on pressor, started tube feeds.


Vital Signs:





                                   Vital Signs








  Date Time  Temp Pulse Resp B/P (MAP) Pulse Ox O2 Delivery O2 Flow Rate FiO2


 


7/2/20 09:35     99 Ventilator  


 


7/2/20 09:00 98.6 87 20 130/60 (83)    





 98.6       


 


7/2/20 06:41       40.0 








Labs:





Laboratory Tests








Test


 7/1/20


11:55 7/1/20


16:30 7/1/20


17:49 7/2/20


00:19


 


O2 Saturation 97 %    


 


Arterial Blood pH 7.38    


 


Arterial Blood pCO2 at


Patient Temp 38 mmHg 


 


 


 





 


Arterial Blood pO2 at Patient


Temp 111 mmHg 


 


 


 





 


Arterial Blood HCO3 22 mmol/L    


 


Arterial Blood Base Excess -3 mmol/L    


 


FiO2 40    


 


Glucose (Fingerstick) 235 mg/dL   201 mg/dL  200 mg/dL 


 


Hemoglobin  7.3 g/dL   


 


Hematocrit  21.4 %   


 


Mean Corpuscular Hemoglobin


Concent 


 34 g/dL 


 


 





 


Test


 7/2/20


06:09 7/2/20


06:10 7/2/20


08:00 





 


Glucose (Fingerstick) 157 mg/dL    


 


White Blood Count  38.3 x10^3/uL   


 


Red Blood Count  3.06 x10^6/uL   


 


Hemoglobin  9.0 g/dL   


 


Hematocrit  26.8 %   


 


Mean Corpuscular Volume  88 fL   


 


Mean Corpuscular Hemoglobin  29 pg   


 


Mean Corpuscular Hemoglobin


Concent 


 34 g/dL 


 


 





 


Red Cell Distribution Width  14.8 %   


 


Platelet Count  278 x10^3/uL   


 


Sodium Level  134 mmol/L   


 


Potassium Level  4.0 mmol/L   


 


Chloride Level  103 mmol/L   


 


Carbon Dioxide Level  28 mmol/L   


 


Anion Gap  3   


 


Blood Urea Nitrogen  24 mg/dL   


 


Creatinine  0.7 mg/dL   


 


Estimated GFR


(Cockcroft-Gault) 


 88.9 


 


 





 


Glucose Level  178 mg/dL   


 


Calcium Level  8.9 mg/dL   


 


Phosphorus Level  4.2 mg/dL   


 


Magnesium Level  2.1 mg/dL   


 


O2 Saturation   98 %  


 


Arterial Blood pH   7.36  


 


Arterial Blood pCO2 at


Patient Temp 


 


 41 mmHg 


 





 


Arterial Blood pO2 at Patient


Temp 


 


 127 mmHg 


 





 


Arterial Blood HCO3   23 mmol/L  


 


Arterial Blood Base Excess   -2 mmol/L  


 


FiO2   40  





ORDERED:  MARJAN/DON/BENJAMÍN                                                        

 


COMMENTS: PANCREATIC NECROSIS                                         


--------------------------------------------------

------------------------------------------





  Procedure                         Result                                      

         


 

--------------------------------------------------------------------------------


------------





  GRAM STAIN  Final  


        Final





        SQUAMOUS EPI CELL:RARE


        PMN (WBCs):FEW


        YEAST:FEW


        Unless otherwise specified, Testing Performed by:


        [address] [address]


        For Inquires, the Physician may contact the Microbiology


        department at [phone]





  ANAEROBIC-AEROBIC CULTURE  


                                PENDING











  BLOOD CULTURE  Preliminary  


        NO GROWTH AFTER 3 DAYS





PE:





GEN: chronically ill


LUNGS: tach/vent


HEART: RRR


ABD: many drains, tube feed running


NEURO/PSYCH: sedated





A/P:


Severe gallstone pancreatitis s/p expl laparotomy, REN, subtotal 

cholecystectomy, gastrojejunostomy placement, pancreatic necrosectomy





--


Supportive care.





Justicifation of Admission Dx:


Justifications for Admission:


Justification of Admission Dx:  Yes











RAGHAVENDRA MURCIA          Jul 2, 2020 10:46

## 2020-07-02 NOTE — NUR
SS following up with discharge planning. SS reviewed pt chart and discussed with pt RN. No 
new changes today. Pt had surgery, 6/30/2020. Pt currently on vent with NG tube and J tube. 
Pt has chest tube, ROBERT drains x3, Avi drains x2. Pt on TPN and IV Micafungin, 
Daptomycin, and Meropenem. Pt received one unit of blood last night. SS will continue to 
follow for discharge planning.

## 2020-07-02 NOTE — PDOC
Infectious Disease Note


Subjective


Subjective


Trach/vent FiO2 40% satting 100%


No fevers last 24 hrs


Tube feedings 10 ml/hr


On vasopressin and low levophed gtt 


No BM





ROS


ROS


unobtainable due to patient's condition





Vital Sign


Vital Signs





Vital Signs








  Date Time  Temp Pulse Resp B/P (MAP) Pulse Ox O2 Delivery O2 Flow Rate FiO2


 


7/2/20 08:37   18  99   


 


7/2/20 08:00  76  109/80 (90)  Ventilator  


 


7/2/20 06:41       40.0 


 


7/2/20 04:00 99.2       





 99.2       











Physical Exam


PHYSICAL EXAM


GENERAL: Sedated, ill appearing


HEENT: Oral cavity dry. + NGT


NECK:  Tracheostomy 


LUNGS: Diminished aeration bases,  CT on left 


HEART:  S1, S2, regular w/ PVCs 


ABDOMEN: Mild distention, bowel sounds hypoactive, soft, gildardo x2, 3 ROBERT 

drains, G-J tube and + wound vac 


: [place] 


EXTREMITIES: Generalized edema, no cyanosis. SCDs & Podus boots bilaterally  


SKIN: warm touch. No signs of rash.  


LUE-PICC, left art-line  without signs of complications





Labs


Lab





Laboratory Tests








Test


 7/1/20


11:55 7/1/20


16:30 7/1/20


17:49 7/2/20


00:19


 


O2 Saturation 97 % (92-99)    


 


Arterial Blood pH


 7.38


(7.35-7.45) 


 


 





 


Arterial Blood pCO2 at


Patient Temp 38 mmHg


(35-46) 


 


 





 


Arterial Blood pO2 at Patient


Temp 111 mmHg


() 


 


 





 


Arterial Blood HCO3


 22 mmol/L


(21-28) 


 


 





 


Arterial Blood Base Excess


 -3 mmol/L


(-3-3) 


 


 





 


FiO2 40    


 


Glucose (Fingerstick)


 235 mg/dL


(70-99) 


 201 mg/dL


(70-99) 200 mg/dL


(70-99)


 


Hemoglobin


 


 7.3 g/dL


(12.0-15.5) 


 





 


Hematocrit


 


 21.4 %


(36.0-47.0) 


 





 


Mean Corpuscular Hemoglobin


Concent 


 34 g/dL


(31-37) 


 





 


Test


 7/2/20


06:09 7/2/20


06:10 


 





 


Glucose (Fingerstick)


 157 mg/dL


(70-99) 


 


 





 


White Blood Count


 


 38.3 x10^3/uL


(4.0-11.0) 


 





 


Red Blood Count


 


 3.06 x10^6/uL


(3.50-5.40) 


 





 


Hemoglobin


 


 9.0 g/dL


(12.0-15.5) 


 





 


Hematocrit


 


 26.8 %


(36.0-47.0) 


 





 


Mean Corpuscular Volume  88 fL ()   


 


Mean Corpuscular Hemoglobin  29 pg (25-35)   


 


Mean Corpuscular Hemoglobin


Concent 


 34 g/dL


(31-37) 


 





 


Red Cell Distribution Width


 


 14.8 %


(11.5-14.5) 


 





 


Platelet Count


 


 278 x10^3/uL


(140-400) 


 





 


Sodium Level


 


 134 mmol/L


(136-145) 


 





 


Potassium Level


 


 4.0 mmol/L


(3.5-5.1) 


 





 


Chloride Level


 


 103 mmol/L


() 


 





 


Carbon Dioxide Level


 


 28 mmol/L


(21-32) 


 





 


Anion Gap  3 (6-14)   


 


Blood Urea Nitrogen


 


 24 mg/dL


(7-20) 


 





 


Creatinine


 


 0.7 mg/dL


(0.6-1.0) 


 





 


Estimated GFR


(Cockcroft-Gault) 


 88.9 


 


 





 


Glucose Level


 


 178 mg/dL


(70-99) 


 





 


Calcium Level


 


 8.9 mg/dL


(8.5-10.1) 


 





 


Phosphorus Level


 


 4.2 mg/dL


(2.6-4.7) 


 





 


Magnesium Level


 


 2.1 mg/dL


(1.8-2.4) 


 











Micro


6/28. BLOOD CULTURE  Preliminary  


        NO GROWTH AFTER 3 DAYS





Objective


Assessment


Patient with prolonged hospitalization more than 3 months


Multiple medical problems


Multiple surgical procedures





S/P Exp. Lap, REN, subtotal cholecystectomy with cholangiogram, G-J tube 

placement & pancreatic necrosectomy on June 30 YEAST


Leucocytosis - leukomoid reaction likely postoperative


Fever intermittently source likely GI


Acute gallstone pancreatitis with persistent necrosis


  -CT a/p 4/9.  Increased ascites. Persistent evidence of necrotizing 

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


           - 4/27. status post ROBERT drain placement; C. parapsilosis. s/p drain 

5/6 + yeast & high amylase; s/p additional drain on 5/8. Drains removed. 


           -5/6. fluid  candida parapsilosis fluid, amylase high


           - 6/6 showed multiple pseudocysts, slight larger on the right. s/p 

drains x 3, 6/7.  + PSAE (MDRO-R Cefepime, Zosyn ANSON < 64) and yeast, 


           -6/7 s/p drain replacement x 3; fluid cult PSAE (MDRO), yeast; 

treated


Ascites s/p paracentesis 4/15 & 5/6. C. parapsilosis 


Cholelithiasis with thickening of the gallbladder wall.


JED, Hyperkalemia, Metabolic acidosis off dialysis


Acute hypoxic resp failure. trach/vent. sputum 6/13  + PSAE (I merrem) 


Pleural effusions s/p left thoracentesis, 5/12. no culture. s/p left chest tube,

6/15 no growth


Hypocalcemia 


Prediabetes


HTN


Anemia s/p RBCs





Plan


Plan of Care


continue dapto, merrem and micafungin


Monitor labs/cults


wound care /drain management as directed





Contact isolation for CRE/MDRO


Critically ill





D/w nursing





Attending Co-Sign


Attending Co-Sign


The patient was seen and interviewed as well as examined at the bedside. The 

chart was reviewed. The case was discussed. Agree with the plan of care.











HAVEN WINTERS         Jul 2, 2020 09:30


RASHAWN ROSEN MD               Jul 2, 2020 15:45

## 2020-07-02 NOTE — PDOC
SURGICAL PROGRESS NOTE


Subjective


Patient is intubated and sedated


Vital Signs





Vital Signs








  Date Time  Temp Pulse Resp B/P (MAP) Pulse Ox O2 Delivery O2 Flow Rate FiO2


 


7/2/20 08:37   18  99   


 


7/2/20 08:00      Mechanical Ventilator  


 


7/2/20 08:00  76  109/80 (90)    


 


7/2/20 06:41       40.0 


 


7/2/20 04:00 99.2       





 99.2       








I&O











Intake and Output 


 


 7/2/20





 07:00


 


Intake Total 4244.80 ml


 


Output Total 2435 ml


 


Balance 1809.80 ml


 


 


 


IV Total 3391.80 ml


 


Tube Feeding 304 ml


 


Blood Product IV Normal Saline Flush 449 ml


 


Other 100 ml


 


Output Urine Total 750 ml


 


Gastric Drainage Total 150 ml


 


Chest Tube Drainage Total 160 ml


 


Drainage Total 1375 ml








PATIENT HAS A VILLASENOR:  Yes


General:  Other (Sedated)


HEENT:  Other (NG tube in place)


Lungs:  Clear to auscultation, Normal air movement


Abdomen:  Other (Abdomen is soft wounds dressed drains in place pancreatic 

drains draining dark fluid tube feeds through her J-tube at 10 cc an hour)


Extremities:  Other (Diffuse edema)


Labs





Laboratory Tests








Test


 6/30/20


09:52 6/30/20


15:30 6/30/20


16:10 6/30/20


21:30


 


Glucose (Fingerstick)


 105 mg/dL


(70-99) 


 


 





 


White Blood Count


 


 49.3 x10^3/uL


(4.0-11.0) 


 21.5 x10^3/uL


(4.0-11.0)


 


Red Blood Count


 


 2.36 x10^6/uL


(3.50-5.40) 


 2.77 x10^6/uL


(3.50-5.40)


 


Hemoglobin


 


 6.7 g/dL


(12.0-15.5) 


 8.2 g/dL


(12.0-15.5)


 


Hematocrit


 


 20.9 %


(36.0-47.0) 


 23.1 %


(36.0-47.0)


 


Mean Corpuscular Volume  89 fL ()   84 fL () 


 


Mean Corpuscular Hemoglobin  28 pg (25-35)   30 pg (25-35) 


 


Mean Corpuscular Hemoglobin


Concent 


 32 g/dL


(31-37) 


 36 g/dL


(31-37)


 


Red Cell Distribution Width


 


 16.7 %


(11.5-14.5) 


 14.8 %


(11.5-14.5)


 


Platelet Count


 


 335 x10^3/uL


(140-400) 


 211 x10^3/uL


(140-400)


 


Sodium Level


 


 138 mmol/L


(136-145) 


 





 


Potassium Level


 


 4.3 mmol/L


(3.5-5.1) 


 





 


Chloride Level


 


 107 mmol/L


() 


 





 


Carbon Dioxide Level


 


 15 mmol/L


(21-32) 


 





 


Anion Gap  16 (6-14)   


 


Blood Urea Nitrogen


 


 11 mg/dL


(7-20) 


 





 


Creatinine


 


 0.7 mg/dL


(0.6-1.0) 


 





 


Estimated GFR


(Cockcroft-Gault) 


 88.9 


 


 





 


Glucose Level


 


 169 mg/dL


(70-99) 


 





 


Calcium Level


 


 7.5 mg/dL


(8.5-10.1) 


 





 


Magnesium Level


 


 1.4 mg/dL


(1.8-2.4) 


 





 


O2 Saturation   99 % (92-99)  


 


Arterial Blood pH


 


 


 7.25


(7.35-7.45) 





 


Arterial Blood pCO2 at


Patient Temp 


 


 32 mmHg


(35-46) 





 


Arterial Blood pO2 at Patient


Temp 


 


 374 mmHg


() 





 


Arterial Blood HCO3


 


 


 13 mmol/L


(21-28) 





 


Arterial Blood Base Excess


 


 


 -13 mmol/L


(-3-3) 





 


FiO2   80%+5  


 


Test


 7/1/20


06:00 7/1/20


06:06 7/1/20


08:30 7/1/20


11:55


 


White Blood Count


 18.9 x10^3/uL


(4.0-11.0) 


 


 





 


Red Blood Count


 2.55 x10^6/uL


(3.50-5.40) 


 


 





 


Hemoglobin


 7.5 g/dL


(12.0-15.5) 


 


 





 


Hematocrit


 21.6 %


(36.0-47.0) 


 


 





 


Mean Corpuscular Volume 85 fL ()    


 


Mean Corpuscular Hemoglobin 29 pg (25-35)    


 


Mean Corpuscular Hemoglobin


Concent 35 g/dL


(31-37) 


 


 





 


Red Cell Distribution Width


 14.8 %


(11.5-14.5) 


 


 





 


Platelet Count


 253 x10^3/uL


(140-400) 


 


 





 


Neutrophils (%) (Auto) 85 % (31-73)    


 


Lymphocytes (%) (Auto) 8 % (24-48)    


 


Monocytes (%) (Auto) 6 % (0-9)    


 


Eosinophils (%) (Auto) 0 % (0-3)    


 


Basophils (%) (Auto) 0 % (0-3)    


 


Neutrophils # (Auto)


 16.1 x10^3/uL


(1.8-7.7) 


 


 





 


Lymphocytes # (Auto)


 1.5 x10^3/uL


(1.0-4.8) 


 


 





 


Monocytes # (Auto)


 1.2 x10^3/uL


(0.0-1.1) 


 


 





 


Eosinophils # (Auto)


 0.0 x10^3/uL


(0.0-0.7) 


 


 





 


Basophils # (Auto)


 0.1 x10^3/uL


(0.0-0.2) 


 


 





 


Sodium Level


 138 mmol/L


(136-145) 


 


 





 


Potassium Level


 3.9 mmol/L


(3.5-5.1) 


 


 





 


Chloride Level


 104 mmol/L


() 


 


 





 


Carbon Dioxide Level


 26 mmol/L


(21-32) 


 


 





 


Anion Gap 8 (6-14)    


 


Blood Urea Nitrogen


 19 mg/dL


(7-20) 


 


 





 


Creatinine


 0.8 mg/dL


(0.6-1.0) 


 


 





 


Estimated GFR


(Cockcroft-Gault) 76.2 


 


 


 





 


Glucose Level


 264 mg/dL


(70-99) 


 


 





 


Calcium Level


 9.2 mg/dL


(8.5-10.1) 


 


 





 


Phosphorus Level


 3.5 mg/dL


(2.6-4.7) 


 


 





 


Magnesium Level


 2.1 mg/dL


(1.8-2.4) 


 


 





 


Glucose (Fingerstick)


 


 249 mg/dL


(70-99) 


 235 mg/dL


(70-99)


 


O2 Saturation   98 % (92-99)  97 % (92-99) 


 


Arterial Blood pH


 


 


 7.60


(7.35-7.45) 7.38


(7.35-7.45)


 


Arterial Blood pCO2 at


Patient Temp 


 


 22 mmHg


(35-46) 38 mmHg


(35-46)


 


Arterial Blood pO2 at Patient


Temp 


 


 120 mmHg


() 111 mmHg


()


 


Arterial Blood HCO3


 


 


 21 mmol/L


(21-28) 22 mmol/L


(21-28)


 


Arterial Blood Base Excess


 


 


 0 mmol/L


(-3-3) -3 mmol/L


(-3-3)


 


FiO2   40  40 


 


Test


 7/1/20


16:30 7/1/20


17:49 7/2/20


00:19 7/2/20


06:09


 


Hemoglobin


 7.3 g/dL


(12.0-15.5) 


 


 





 


Hematocrit


 21.4 %


(36.0-47.0) 


 


 





 


Mean Corpuscular Hemoglobin


Concent 34 g/dL


(31-37) 


 


 





 


Glucose (Fingerstick)


 


 201 mg/dL


(70-99) 200 mg/dL


(70-99) 157 mg/dL


(70-99)


 


Test


 7/2/20


06:10 7/2/20


08:00 


 





 


White Blood Count


 38.3 x10^3/uL


(4.0-11.0) 


 


 





 


Red Blood Count


 3.06 x10^6/uL


(3.50-5.40) 


 


 





 


Hemoglobin


 9.0 g/dL


(12.0-15.5) 


 


 





 


Hematocrit


 26.8 %


(36.0-47.0) 


 


 





 


Mean Corpuscular Volume 88 fL ()    


 


Mean Corpuscular Hemoglobin 29 pg (25-35)    


 


Mean Corpuscular Hemoglobin


Concent 34 g/dL


(31-37) 


 


 





 


Red Cell Distribution Width


 14.8 %


(11.5-14.5) 


 


 





 


Platelet Count


 278 x10^3/uL


(140-400) 


 


 





 


Sodium Level


 134 mmol/L


(136-145) 


 


 





 


Potassium Level


 4.0 mmol/L


(3.5-5.1) 


 


 





 


Chloride Level


 103 mmol/L


() 


 


 





 


Carbon Dioxide Level


 28 mmol/L


(21-32) 


 


 





 


Anion Gap 3 (6-14)    


 


Blood Urea Nitrogen


 24 mg/dL


(7-20) 


 


 





 


Creatinine


 0.7 mg/dL


(0.6-1.0) 


 


 





 


Estimated GFR


(Cockcroft-Gault) 88.9 


 


 


 





 


Glucose Level


 178 mg/dL


(70-99) 


 


 





 


Calcium Level


 8.9 mg/dL


(8.5-10.1) 


 


 





 


Phosphorus Level


 4.2 mg/dL


(2.6-4.7) 


 


 





 


Magnesium Level


 2.1 mg/dL


(1.8-2.4) 


 


 





 


O2 Saturation  98 % (92-99)   


 


Arterial Blood pH


 


 7.36


(7.35-7.45) 


 





 


Arterial Blood pCO2 at


Patient Temp 


 41 mmHg


(35-46) 


 





 


Arterial Blood pO2 at Patient


Temp 


 127 mmHg


() 


 





 


Arterial Blood HCO3


 


 23 mmol/L


(21-28) 


 





 


Arterial Blood Base Excess


 


 -2 mmol/L


(-3-3) 


 





 


FiO2  40   








Laboratory Tests








Test


 7/1/20


11:55 7/1/20


16:30 7/1/20


17:49 7/2/20


00:19


 


O2 Saturation 97 % (92-99)    


 


Arterial Blood pH


 7.38


(7.35-7.45) 


 


 





 


Arterial Blood pCO2 at


Patient Temp 38 mmHg


(35-46) 


 


 





 


Arterial Blood pO2 at Patient


Temp 111 mmHg


() 


 


 





 


Arterial Blood HCO3


 22 mmol/L


(21-28) 


 


 





 


Arterial Blood Base Excess


 -3 mmol/L


(-3-3) 


 


 





 


FiO2 40    


 


Glucose (Fingerstick)


 235 mg/dL


(70-99) 


 201 mg/dL


(70-99) 200 mg/dL


(70-99)


 


Hemoglobin


 


 7.3 g/dL


(12.0-15.5) 


 





 


Hematocrit


 


 21.4 %


(36.0-47.0) 


 





 


Mean Corpuscular Hemoglobin


Concent 


 34 g/dL


(31-37) 


 





 


Test


 7/2/20


06:09 7/2/20


06:10 7/2/20


08:00 





 


Glucose (Fingerstick)


 157 mg/dL


(70-99) 


 


 





 


White Blood Count


 


 38.3 x10^3/uL


(4.0-11.0) 


 





 


Red Blood Count


 


 3.06 x10^6/uL


(3.50-5.40) 


 





 


Hemoglobin


 


 9.0 g/dL


(12.0-15.5) 


 





 


Hematocrit


 


 26.8 %


(36.0-47.0) 


 





 


Mean Corpuscular Volume  88 fL ()   


 


Mean Corpuscular Hemoglobin  29 pg (25-35)   


 


Mean Corpuscular Hemoglobin


Concent 


 34 g/dL


(31-37) 


 





 


Red Cell Distribution Width


 


 14.8 %


(11.5-14.5) 


 





 


Platelet Count


 


 278 x10^3/uL


(140-400) 


 





 


Sodium Level


 


 134 mmol/L


(136-145) 


 





 


Potassium Level


 


 4.0 mmol/L


(3.5-5.1) 


 





 


Chloride Level


 


 103 mmol/L


() 


 





 


Carbon Dioxide Level


 


 28 mmol/L


(21-32) 


 





 


Anion Gap  3 (6-14)   


 


Blood Urea Nitrogen


 


 24 mg/dL


(7-20) 


 





 


Creatinine


 


 0.7 mg/dL


(0.6-1.0) 


 





 


Estimated GFR


(Cockcroft-Gault) 


 88.9 


 


 





 


Glucose Level


 


 178 mg/dL


(70-99) 


 





 


Calcium Level


 


 8.9 mg/dL


(8.5-10.1) 


 





 


Phosphorus Level


 


 4.2 mg/dL


(2.6-4.7) 


 





 


Magnesium Level


 


 2.1 mg/dL


(1.8-2.4) 


 





 


O2 Saturation   98 % (92-99)  


 


Arterial Blood pH


 


 


 7.36


(7.35-7.45) 





 


Arterial Blood pCO2 at


Patient Temp 


 


 41 mmHg


(35-46) 





 


Arterial Blood pO2 at Patient


Temp 


 


 127 mmHg


() 





 


Arterial Blood HCO3


 


 


 23 mmol/L


(21-28) 





 


Arterial Blood Base Excess


 


 


 -2 mmol/L


(-3-3) 





 


FiO2   40  








Problem List


Problems


Medical Problems:


(1) Acute pancreatitis


Status: Acute  





(2) Cholelithiasis


Status: Acute  








Assessment/Plan


Status post pancreatic debridement with drains


Awaiting return of bowel function to increase tube feeds


Supportive care


Wean pressors as tolerated





Justicifation of Admission Dx:


Justifications for Admission:


Justification of Admission Dx:  Yes











OVIDIO MEDINA MD              Jul 2, 2020 09:23

## 2020-07-02 NOTE — PDOC
PULMONARY PROGRESS NOTES


Subjective


Patient had surgery now back on assist control ventilation


Vitals





Vital Signs








  Date Time  Temp Pulse Resp B/P (MAP) Pulse Ox O2 Delivery O2 Flow Rate FiO2


 


7/2/20 09:00 98.6 87 20 130/60 (83) 99 Ventilator  





 98.6       


 


7/2/20 06:41       40.0 








General:  Alert


Lungs:  Crackles


Cardiovascular:  S1, S2


Abdomen:  Soft, Non-tender, Other (multiple ROBERT drains )


Extremities:  Other (+1 BLE edema)


Skin:  Warm


Labs





Laboratory Tests








Test


 6/30/20


09:52 6/30/20


15:30 6/30/20


16:10 6/30/20


21:30


 


Glucose (Fingerstick)


 105 mg/dL


(70-99) 


 


 





 


White Blood Count


 


 49.3 x10^3/uL


(4.0-11.0) 


 21.5 x10^3/uL


(4.0-11.0)


 


Red Blood Count


 


 2.36 x10^6/uL


(3.50-5.40) 


 2.77 x10^6/uL


(3.50-5.40)


 


Hemoglobin


 


 6.7 g/dL


(12.0-15.5) 


 8.2 g/dL


(12.0-15.5)


 


Hematocrit


 


 20.9 %


(36.0-47.0) 


 23.1 %


(36.0-47.0)


 


Mean Corpuscular Volume  89 fL ()   84 fL () 


 


Mean Corpuscular Hemoglobin  28 pg (25-35)   30 pg (25-35) 


 


Mean Corpuscular Hemoglobin


Concent 


 32 g/dL


(31-37) 


 36 g/dL


(31-37)


 


Red Cell Distribution Width


 


 16.7 %


(11.5-14.5) 


 14.8 %


(11.5-14.5)


 


Platelet Count


 


 335 x10^3/uL


(140-400) 


 211 x10^3/uL


(140-400)


 


Sodium Level


 


 138 mmol/L


(136-145) 


 





 


Potassium Level


 


 4.3 mmol/L


(3.5-5.1) 


 





 


Chloride Level


 


 107 mmol/L


() 


 





 


Carbon Dioxide Level


 


 15 mmol/L


(21-32) 


 





 


Anion Gap  16 (6-14)   


 


Blood Urea Nitrogen


 


 11 mg/dL


(7-20) 


 





 


Creatinine


 


 0.7 mg/dL


(0.6-1.0) 


 





 


Estimated GFR


(Cockcroft-Gault) 


 88.9 


 


 





 


Glucose Level


 


 169 mg/dL


(70-99) 


 





 


Calcium Level


 


 7.5 mg/dL


(8.5-10.1) 


 





 


Magnesium Level


 


 1.4 mg/dL


(1.8-2.4) 


 





 


O2 Saturation   99 % (92-99)  


 


Arterial Blood pH


 


 


 7.25


(7.35-7.45) 





 


Arterial Blood pCO2 at


Patient Temp 


 


 32 mmHg


(35-46) 





 


Arterial Blood pO2 at Patient


Temp 


 


 374 mmHg


() 





 


Arterial Blood HCO3


 


 


 13 mmol/L


(21-28) 





 


Arterial Blood Base Excess


 


 


 -13 mmol/L


(-3-3) 





 


FiO2   80%+5  


 


Test


 7/1/20


06:00 7/1/20


06:06 7/1/20


08:30 7/1/20


11:55


 


White Blood Count


 18.9 x10^3/uL


(4.0-11.0) 


 


 





 


Red Blood Count


 2.55 x10^6/uL


(3.50-5.40) 


 


 





 


Hemoglobin


 7.5 g/dL


(12.0-15.5) 


 


 





 


Hematocrit


 21.6 %


(36.0-47.0) 


 


 





 


Mean Corpuscular Volume 85 fL ()    


 


Mean Corpuscular Hemoglobin 29 pg (25-35)    


 


Mean Corpuscular Hemoglobin


Concent 35 g/dL


(31-37) 


 


 





 


Red Cell Distribution Width


 14.8 %


(11.5-14.5) 


 


 





 


Platelet Count


 253 x10^3/uL


(140-400) 


 


 





 


Neutrophils (%) (Auto) 85 % (31-73)    


 


Lymphocytes (%) (Auto) 8 % (24-48)    


 


Monocytes (%) (Auto) 6 % (0-9)    


 


Eosinophils (%) (Auto) 0 % (0-3)    


 


Basophils (%) (Auto) 0 % (0-3)    


 


Neutrophils # (Auto)


 16.1 x10^3/uL


(1.8-7.7) 


 


 





 


Lymphocytes # (Auto)


 1.5 x10^3/uL


(1.0-4.8) 


 


 





 


Monocytes # (Auto)


 1.2 x10^3/uL


(0.0-1.1) 


 


 





 


Eosinophils # (Auto)


 0.0 x10^3/uL


(0.0-0.7) 


 


 





 


Basophils # (Auto)


 0.1 x10^3/uL


(0.0-0.2) 


 


 





 


Sodium Level


 138 mmol/L


(136-145) 


 


 





 


Potassium Level


 3.9 mmol/L


(3.5-5.1) 


 


 





 


Chloride Level


 104 mmol/L


() 


 


 





 


Carbon Dioxide Level


 26 mmol/L


(21-32) 


 


 





 


Anion Gap 8 (6-14)    


 


Blood Urea Nitrogen


 19 mg/dL


(7-20) 


 


 





 


Creatinine


 0.8 mg/dL


(0.6-1.0) 


 


 





 


Estimated GFR


(Cockcroft-Gault) 76.2 


 


 


 





 


Glucose Level


 264 mg/dL


(70-99) 


 


 





 


Calcium Level


 9.2 mg/dL


(8.5-10.1) 


 


 





 


Phosphorus Level


 3.5 mg/dL


(2.6-4.7) 


 


 





 


Magnesium Level


 2.1 mg/dL


(1.8-2.4) 


 


 





 


Glucose (Fingerstick)


 


 249 mg/dL


(70-99) 


 235 mg/dL


(70-99)


 


O2 Saturation   98 % (92-99)  97 % (92-99) 


 


Arterial Blood pH


 


 


 7.60


(7.35-7.45) 7.38


(7.35-7.45)


 


Arterial Blood pCO2 at


Patient Temp 


 


 22 mmHg


(35-46) 38 mmHg


(35-46)


 


Arterial Blood pO2 at Patient


Temp 


 


 120 mmHg


() 111 mmHg


()


 


Arterial Blood HCO3


 


 


 21 mmol/L


(21-28) 22 mmol/L


(21-28)


 


Arterial Blood Base Excess


 


 


 0 mmol/L


(-3-3) -3 mmol/L


(-3-3)


 


FiO2   40  40 


 


Test


 7/1/20


16:30 7/1/20


17:49 7/2/20


00:19 7/2/20


06:09


 


Hemoglobin


 7.3 g/dL


(12.0-15.5) 


 


 





 


Hematocrit


 21.4 %


(36.0-47.0) 


 


 





 


Mean Corpuscular Hemoglobin


Concent 34 g/dL


(31-37) 


 


 





 


Glucose (Fingerstick)


 


 201 mg/dL


(70-99) 200 mg/dL


(70-99) 157 mg/dL


(70-99)


 


Test


 7/2/20


06:10 7/2/20


08:00 


 





 


White Blood Count


 38.3 x10^3/uL


(4.0-11.0) 


 


 





 


Red Blood Count


 3.06 x10^6/uL


(3.50-5.40) 


 


 





 


Hemoglobin


 9.0 g/dL


(12.0-15.5) 


 


 





 


Hematocrit


 26.8 %


(36.0-47.0) 


 


 





 


Mean Corpuscular Volume 88 fL ()    


 


Mean Corpuscular Hemoglobin 29 pg (25-35)    


 


Mean Corpuscular Hemoglobin


Concent 34 g/dL


(31-37) 


 


 





 


Red Cell Distribution Width


 14.8 %


(11.5-14.5) 


 


 





 


Platelet Count


 278 x10^3/uL


(140-400) 


 


 





 


Sodium Level


 134 mmol/L


(136-145) 


 


 





 


Potassium Level


 4.0 mmol/L


(3.5-5.1) 


 


 





 


Chloride Level


 103 mmol/L


() 


 


 





 


Carbon Dioxide Level


 28 mmol/L


(21-32) 


 


 





 


Anion Gap 3 (6-14)    


 


Blood Urea Nitrogen


 24 mg/dL


(7-20) 


 


 





 


Creatinine


 0.7 mg/dL


(0.6-1.0) 


 


 





 


Estimated GFR


(Cockcroft-Gault) 88.9 


 


 


 





 


Glucose Level


 178 mg/dL


(70-99) 


 


 





 


Calcium Level


 8.9 mg/dL


(8.5-10.1) 


 


 





 


Phosphorus Level


 4.2 mg/dL


(2.6-4.7) 


 


 





 


Magnesium Level


 2.1 mg/dL


(1.8-2.4) 


 


 





 


O2 Saturation  98 % (92-99)   


 


Arterial Blood pH


 


 7.36


(7.35-7.45) 


 





 


Arterial Blood pCO2 at


Patient Temp 


 41 mmHg


(35-46) 


 





 


Arterial Blood pO2 at Patient


Temp 


 127 mmHg


() 


 





 


Arterial Blood HCO3


 


 23 mmol/L


(21-28) 


 





 


Arterial Blood Base Excess


 


 -2 mmol/L


(-3-3) 


 





 


FiO2  40   








Laboratory Tests








Test


 7/1/20


11:55 7/1/20


16:30 7/1/20


17:49 7/2/20


00:19


 


O2 Saturation 97 % (92-99)    


 


Arterial Blood pH


 7.38


(7.35-7.45) 


 


 





 


Arterial Blood pCO2 at


Patient Temp 38 mmHg


(35-46) 


 


 





 


Arterial Blood pO2 at Patient


Temp 111 mmHg


() 


 


 





 


Arterial Blood HCO3


 22 mmol/L


(21-28) 


 


 





 


Arterial Blood Base Excess


 -3 mmol/L


(-3-3) 


 


 





 


FiO2 40    


 


Glucose (Fingerstick)


 235 mg/dL


(70-99) 


 201 mg/dL


(70-99) 200 mg/dL


(70-99)


 


Hemoglobin


 


 7.3 g/dL


(12.0-15.5) 


 





 


Hematocrit


 


 21.4 %


(36.0-47.0) 


 





 


Mean Corpuscular Hemoglobin


Concent 


 34 g/dL


(31-37) 


 





 


Test


 7/2/20


06:09 7/2/20


06:10 7/2/20


08:00 





 


Glucose (Fingerstick)


 157 mg/dL


(70-99) 


 


 





 


White Blood Count


 


 38.3 x10^3/uL


(4.0-11.0) 


 





 


Red Blood Count


 


 3.06 x10^6/uL


(3.50-5.40) 


 





 


Hemoglobin


 


 9.0 g/dL


(12.0-15.5) 


 





 


Hematocrit


 


 26.8 %


(36.0-47.0) 


 





 


Mean Corpuscular Volume  88 fL ()   


 


Mean Corpuscular Hemoglobin  29 pg (25-35)   


 


Mean Corpuscular Hemoglobin


Concent 


 34 g/dL


(31-37) 


 





 


Red Cell Distribution Width


 


 14.8 %


(11.5-14.5) 


 





 


Platelet Count


 


 278 x10^3/uL


(140-400) 


 





 


Sodium Level


 


 134 mmol/L


(136-145) 


 





 


Potassium Level


 


 4.0 mmol/L


(3.5-5.1) 


 





 


Chloride Level


 


 103 mmol/L


() 


 





 


Carbon Dioxide Level


 


 28 mmol/L


(21-32) 


 





 


Anion Gap  3 (6-14)   


 


Blood Urea Nitrogen


 


 24 mg/dL


(7-20) 


 





 


Creatinine


 


 0.7 mg/dL


(0.6-1.0) 


 





 


Estimated GFR


(Cockcroft-Gault) 


 88.9 


 


 





 


Glucose Level


 


 178 mg/dL


(70-99) 


 





 


Calcium Level


 


 8.9 mg/dL


(8.5-10.1) 


 





 


Phosphorus Level


 


 4.2 mg/dL


(2.6-4.7) 


 





 


Magnesium Level


 


 2.1 mg/dL


(1.8-2.4) 


 





 


O2 Saturation   98 % (92-99)  


 


Arterial Blood pH


 


 


 7.36


(7.35-7.45) 





 


Arterial Blood pCO2 at


Patient Temp 


 


 41 mmHg


(35-46) 





 


Arterial Blood pO2 at Patient


Temp 


 


 127 mmHg


() 





 


Arterial Blood HCO3


 


 


 23 mmol/L


(21-28) 





 


Arterial Blood Base Excess


 


 


 -2 mmol/L


(-3-3) 





 


FiO2   40  








Medications





Active Scripts








 Medications  Dose


 Route/Sig


 Max Daily Dose Days Date Category


 


 Bisoprolol


 Fumarate 5 Mg


 Tablet  10 Mg


 PO DAILY


   3/16/20 Reported








Comments


 CXR 6/28/20


IMPRESSION:


No significant interval change compared to 6/25/2020.


 











ct abdomen /pelvis 6/6


1. Removal of the percutaneous pigtail drainage catheters since the prior 


exam. Sequela of pancreatitis with extensive pseudocysts again 


demonstrated, the right-sided collections are slightly larger since the 


prior exam, the left-sided collections are stable. See above.


2. Moderate to large left pleural effusion with atelectasis and collapse 


of most of the left lower lobe, stable. Small right pleural effusion is 


stable.


3. Gallstone.





ct chest 6/15 reviewed








 GRAM NEG COCCOBACILLI:MANY


        SQUAMOUS EPI CELL:RARE


        PMN (WBCs):[id number]


        Unless otherwise specified, Testing Performed by:


        [address] [address]


        For Inquires, the Physician may contact the Microbiology


        department at [phone]





  RESPIRATORY CULTURE  Final  


        Final





        MANY GRAM NEGATIVE RODS on 06/15/20 at 1107


        FINAL ID= [PSEUDOMONAS AERUGINOSA]


        MICRO CHARGES


        PSEUDOMONAS AERUGINOSA





  ANTIMICROBIAL SUSCEPTIBILITY  Final  


        Comment





        NEG ANSON 56


        PSEUDOMONAS AERUGINOSA


        ANTIBIOTIC                        RESULT          INTERPRETATION


        AMIKACIN                          <=16                  S


        AZTREONAM                         <=4                   S


        CEFTAZIDIME                       <=1                   S


        CIPROFLOXACIN                     <=0.25                S


        CEFEPIME                          <=2                   S


        CEFTAZIDIME/AVIBACTAM             <=4                   S


        GENTAMICIN                        <=2                   S


        LEVOFLOXACIN                      <=0.5                 S





Impression


.





IMPRESSION:


1.  Acute hypoxemic respiratory failure secondary to ARDS status post trach, 

developed anemia 6/7, blood drainage from RLQ abdomen drain site, and 

surrounding firmness  / developed septic shock 6/7 from abdomen source, required

levo 6/7


s/p 3 new drains 6/7 with brown color drainage,  on/off vent , on TS now


2.  Gallstone pancreatitis, now with ongoing bleeding from prior drain. Anemic. 

s/p Tx multiple units over several days


3.  septic shock/sepsis, recurrent 6/7, source abdomen. , off levo now, 


4.  Acute kidney injury-, Off HD--renal function decling. suspect JED on CKD due

to hypotension , improved now


5.  Acute gallstone pancreatitis.


6.  Hypoalbuminemia.


7.  Moderate persistent effusions, s/p left thora  5/12, reaccumulation of left 

effusion. O2 requirement not changed. 


8.  Fever- ,hypotension. suspect recurrent sepsis/ likely pancreatic source.  

Per ID, per surgery--


9.  Chronic anemia-- ongoing / s/p PRBC


10. Covid 19 testing negative


11. Moderate to large ascites-S/P paracentisis


12.S/P paracentisis with 4 liters removed on 4/15/20


13. S/P IR drain placement on 5/8/2020, removal, re inserted 6/7


14. Depression/Anxiety 


15. Increase effusion, ? loculated/ s/p chest tube.. drainage slowing down. 200 

cc /24 hr





6/30


Exploratory laparotomy, lysis of adhesions, subtotal cholecystectomy with 

cholangiogram, gastrojejunostomy tube placement, pancreatic necrosectomy





Plan


.


We will continue assist control ventilation


Multiple drains in place OR note


Anticoagulation for DVT prophylaxis


Antibiotics per ID


Pressors for hypotension


IV fluids


Follow labs.





CC time 30 minutes











STEVE MIRANDA MD               Jul 2, 2020 09:31

## 2020-07-02 NOTE — PDOC
PROGRESS NOTES


Chief Complaint


Chief Complaint


 


48yo F w/ PMHx HTN, prediabetes who presented the emergency room complaints of 

abdominal pain. Patient described off and on 3 days. She states is constant, 

described as a squeezing sensation in a band-like distribution. + nausea, 

vomiting.  She denies any fever or diarrhea.  Patient denies any abdominal 

surgical procedures.  She stateed is worse with movements, car ride.  Pain 

initially was upper abdomen however now pretty much generalized.  Last bowel 

movement was 3/15/2020. Nothing makes her pain better.  Patient denies any 

shortness of breath.  She does state the pain moves into her chest.  Denies any 

headache or visual changes.


Lipase 13728, , , Bilirubin 1.4.


CT abdomen confirms pancreatic inflammation, peripancreatic fluid and 

inflammatory changes around the pancreas consistent with pancreatitis. 

Cholelithiasis and 1.4cm uterine fibroid as well as possible left salpingitis. 

Admitted for further care


Gallstone pancreatitis with necrosis. 


   -CT A/P 6/6 showed multiple pseudocysts, slight larger on the right. s/p 

drains x 3, 6/7.  + PSAE (MDRO-R Cefepime, Zosyn ANSON < 64) and yeast, 


   -s/p drain 4/27. C. parapsilosis. s/p drain 5/6 + yeast & high amylase; s/p 

additional drain on 5/8. Drains removed. 


Ascites s/p paracentesis 4/15 & 5/6. C. parapsilosis 


JED. off HD. 





Impression and plan:


Acute hypoxic Respiratory failure required  mechanical ventilation


Tracheostomy


bilateral pleural effusions/pulm edema s/p Throacentesis on 6/15/2020


Severe Acute gallstone pancreatitis (not a surgical candidate at this time) with

necrosis


Acute kidney failure now requiring dialysis


Gallstones (Calculus of gallbladder with acute cholecystitis without 

obstruction)


HTN 


Intractable pain


Intractable nausea


Covid 19 negative. 


Acute on chronic anemia 


EEG: No seizure activityFever  - better currently - intermittent could be from 

underlying pancreatitis blood cults 5/4 - neg so far


? Ileus with vomiting


Abd distention - U/S and CT reviewed s/p 0.4 L of opaque, debris-containing 

ascites was removed 5/6


Acute pancreatitis with persistent necrosis


Gallstone pancreatitis with necrosis. 


   -CT A/P 6/6 showed multiple pseudocysts, slight larger on the right. s/p 

drains x 3, 6/7.  + PSAE (MDRO-R Cefepime, Zosyn ANSON < 64) and yeast, 


   -s/p drain 4/27. C. parapsilosis. s/p drain 5/6 + yeast & high amylase; s/p 

additional drain on 5/8. Drains removed. 


Ascites s/p paracentesis 4/15 & 5/6. C. parapsilosis 


JED. off HD. 


A large fluid collection in the pancreatic bed has slightly decreased in size, 

described below, the pancreas itself is difficult to  visualize, which could be 

due to necrosis or obscuration of pancreatic  parenchyma from the surrounding 

fluid collection.6/15 


- 4/27 status post ROBERT drain placement + C paropsilosis. s/p additional drains 

5/8


Anemia - S/p PRBCs


Cholelithiasis with thickening of the gallbladder wall.


Leucocytosis improving


JED, hyperkalemia, Metabolic acidosis off dialysis


hypocalcemia 


Prediabetes


HTN


s/p trach


ESRD on HD


Hyperglycemia


severe protein-caloric malnutrition


Moderate to large left pleural effusion with atelectasis and collapse  of most 

of the left lower lobe, stable


 


Dispo - ICU, critically ill


Poor prognosis





History of Present Illness


History of Present Illness


 7/2, prongonsis poor,   wean pressors as able





,  s/p surg 


BP better,   labile, 


cont IV meds,  IV nutrition


s/p large surgery, pancreas removal, 








6/30 /2020


fever overnight, blood cult,


cont dapto and merrem ( 6/28)


add micafungin


Patient seen and examined ICU BED


plan OR 6/30  for necrosectomy, cholecystectomy and feeding tube placement.


guarded-long-term prognosis 


Sputum from 6/13 - growing PSA - may be colonization I to Meropenem 


Continue meropenem, has MDRP PSAE June 7 from abd


bleeding from Sx. site RLQ and firmness)stable


oncology consulted   


Cholelithiasis. Mild thickened appearance of the gallbladder wall. sono 6/25  

Mild right hydronephrosis.Fluid collection identified anterior to the pancreas. 

   


  


  





6/26/2020


Patient having trouble with secretions this morning, no other complaints, 

discussed with surgical ARNP. Plans for OR on Tuesday. No fever above 99.9 

overnight, remains tachycardic, medications reviewed.





6/27/2020


Patient with thick secretions, no other acute events reported overnight.  

Patient seems to be quite anxious, I have tried to provide reassurance during my

encounter regarding her upcoming surgical procedure.  Patient seems to be quite 

anxious and seems to experience panic attacks when she first wakes up thinking 

that is the day of surgery.  At the present time she seems to be medically 

optimized for planned surgery, discussed with nursing staff at bedside





6/29/2020





Patient seems to be hallucinating and having probably memories from her former 

life prior to this prolonged hospital stay which is 3 months plus in length.  

Immediate plans are for surgical intervention on Tuesday which will consist of a

Whipple procedure.  At the present time the patient remains critically stable 

her prognosis is quite guarded at best.  fever last night  hallucinations 

continues to have chest tube in multiple abdominal drains as well.  All these 

are probable source of infection 





plan OR 6/30  for necrosectomy, cholecystectomy and feeding tube placement.








37 min cc time


  


      




















Date:  Apr 27, 2020





Pre-Op Diagnosis:


Necrotizing pancreatitis





Post-Op Diagnosis:


same





Procedure Performed:


laparoscopic exploration





Surgeon:


Frandy Flores


Asst:  Dr. Luis Santos





Anesthesia Type:


GETA plus local





Blood Loss:


50





Specimans Obtained:


cultures, debris





Findings:


1000 cc ascites suctioned off, cultures sent, diffuse debris, obliteration of 

surgical planes preventing any meaningful exploration




















 


 





 








6/1/2020


Patient seen and examined in the ICU


She appears extremely ill


She is tachypneic at 35 respirations per minute and tachycardic at 132 bpm


She is extremely encephalopathic and shaky


She appears clammy


Chart reviewed


Discussed with RN


Prognosis extremely guarded at best








5/31/2020


Patient still in ICU


Resting with no apparent distress


Chart reviewed








5/30/2020


Patient seen and examined in the ICU


She is wiping her face with a cough


Discussed with RN


Chart reviewed


We hope to get her out of the ICU later today if possible








5/29/2020


Patient seen and examined in the ICU once again


She is back on NG suction


On IV Zosyn


Has IV TPN


Sedated with Precedex but anxious still


Appears somewhat clammy and pale


Chart reviewed


Discussed with RN


She remains critically ill











 


 


BRIEF OPERATIVE NOTE


Pre-Op Diagnosis


Pancreatitis with pseudocysts, suspected infection


Post-Op Diagnosis


same


Procedure Performed


CT abdominal Drains x 3


Surgeon


Hardy


Anesthesia Type:  Conscious Sedation


Findings


3 abdominal drains, 14F, with turbid pancreatic fluid and necrotic debris in 

each.


Complications


No immediate








5/9: Patient today somewhat restless and having bilious secretions from ET tube,

imaging studies ordered, discussed with consultant. Pretty poor prognosis, 

hopefully is not a fistula, poor surgical candidate. 





5/10: Imaging with no acute events, she seems more stable today compared to 

yesterday. Encouraged as much activity as possible patient at high risk for 

severe depression.





Vitals


Vitals





Vital Signs








  Date Time  Temp Pulse Resp B/P (MAP) Pulse Ox O2 Delivery O2 Flow Rate FiO2


 


7/2/20 14:00  98 16 88/76 (80) 99 Ventilator  


 


7/2/20 12:00 97.7       





 97.7       


 


7/2/20 06:41       40.0 











Physical Exam


Physical Exam


GENERAL: Sedated, ill appearing


HEENT: Oral cavity dry. + NGT


NECK:  Tracheostomy 


LUNGS: Diminished aeration bases,  CT on left 


HEART:  S1, S2, regular w/ PVCs 


ABDOMEN: Mild distention, bowel sounds hypoactive, soft, gildardo x2, 3 ROBERT 

drains, G-J tube and + wound vac 


: [place] 


EXTREMITIES: Generalized edema, no cyanosis. SCDs & Podus boots bilaterally  


SKIN: warm touch. No signs of rash.  


LUE-PICC, left art-line  without signs of complications


General:  Other (Sedated)


Heart:  Regular rate (SR/ST), Other (distant heart sounds)


Lungs:  Crackles


Abdomen:  Other (Abdomen is soft wounds dressed drains in place pancreatic 

drains draining dark fluid tube feeds through her J-tube at 10 cc an hour)


Extremities:  Other (Diffuse edema)


Skin:  No rashes, No significant lesion





Labs


LABS





Laboratory Tests








Test


 7/1/20


16:30 7/1/20


17:49 7/2/20


00:19 7/2/20


06:09


 


Hemoglobin


 7.3 g/dL


(12.0-15.5) 


 


 





 


Hematocrit


 21.4 %


(36.0-47.0) 


 


 





 


Mean Corpuscular Hemoglobin


Concent 34 g/dL


(31-37) 


 


 





 


Glucose (Fingerstick)


 


 201 mg/dL


(70-99) 200 mg/dL


(70-99) 157 mg/dL


(70-99)


 


Test


 7/2/20


06:10 7/2/20


08:00 7/2/20


12:43 





 


White Blood Count


 38.3 x10^3/uL


(4.0-11.0) 


 


 





 


Red Blood Count


 3.06 x10^6/uL


(3.50-5.40) 


 


 





 


Hemoglobin


 9.0 g/dL


(12.0-15.5) 


 


 





 


Hematocrit


 26.8 %


(36.0-47.0) 


 


 





 


Mean Corpuscular Volume 88 fL ()    


 


Mean Corpuscular Hemoglobin 29 pg (25-35)    


 


Mean Corpuscular Hemoglobin


Concent 34 g/dL


(31-37) 


 


 





 


Red Cell Distribution Width


 14.8 %


(11.5-14.5) 


 


 





 


Platelet Count


 278 x10^3/uL


(140-400) 


 


 





 


Sodium Level


 134 mmol/L


(136-145) 


 


 





 


Potassium Level


 4.0 mmol/L


(3.5-5.1) 


 


 





 


Chloride Level


 103 mmol/L


() 


 


 





 


Carbon Dioxide Level


 28 mmol/L


(21-32) 


 


 





 


Anion Gap 3 (6-14)    


 


Blood Urea Nitrogen


 24 mg/dL


(7-20) 


 


 





 


Creatinine


 0.7 mg/dL


(0.6-1.0) 


 


 





 


Estimated GFR


(Cockcroft-Gault) 88.9 


 


 


 





 


Glucose Level


 178 mg/dL


(70-99) 


 


 





 


Calcium Level


 8.9 mg/dL


(8.5-10.1) 


 


 





 


Phosphorus Level


 4.2 mg/dL


(2.6-4.7) 


 


 





 


Magnesium Level


 2.1 mg/dL


(1.8-2.4) 


 


 





 


O2 Saturation  98 % (92-99)   


 


Arterial Blood pH


 


 7.36


(7.35-7.45) 


 





 


Arterial Blood pCO2 at


Patient Temp 


 41 mmHg


(35-46) 


 





 


Arterial Blood pO2 at Patient


Temp 


 127 mmHg


() 


 





 


Arterial Blood HCO3


 


 23 mmol/L


(21-28) 


 





 


Arterial Blood Base Excess


 


 -2 mmol/L


(-3-3) 


 





 


FiO2  40   


 


Glucose (Fingerstick)


 


 


 184 mg/dL


(70-99) 














Assessment and Plan


Assessmemt and Plan


Problems


Medical Problems:


(1) Acute pancreatitis


Status: Acute  





(2) Cholelithiasis


Status: Acute  











Comment


Review of Relevant


I have reviewed the following items josy (where applicable) has been applied.


Labs





Laboratory Tests








Test


 6/30/20


15:30 6/30/20


16:10 6/30/20


21:30 7/1/20


06:00


 


White Blood Count


 49.3 x10^3/uL


(4.0-11.0) 


 21.5 x10^3/uL


(4.0-11.0) 18.9 x10^3/uL


(4.0-11.0)


 


Red Blood Count


 2.36 x10^6/uL


(3.50-5.40) 


 2.77 x10^6/uL


(3.50-5.40) 2.55 x10^6/uL


(3.50-5.40)


 


Hemoglobin


 6.7 g/dL


(12.0-15.5) 


 8.2 g/dL


(12.0-15.5) 7.5 g/dL


(12.0-15.5)


 


Hematocrit


 20.9 %


(36.0-47.0) 


 23.1 %


(36.0-47.0) 21.6 %


(36.0-47.0)


 


Mean Corpuscular Volume 89 fL ()   84 fL ()  85 fL () 


 


Mean Corpuscular Hemoglobin 28 pg (25-35)   30 pg (25-35)  29 pg (25-35) 


 


Mean Corpuscular Hemoglobin


Concent 32 g/dL


(31-37) 


 36 g/dL


(31-37) 35 g/dL


(31-37)


 


Red Cell Distribution Width


 16.7 %


(11.5-14.5) 


 14.8 %


(11.5-14.5) 14.8 %


(11.5-14.5)


 


Platelet Count


 335 x10^3/uL


(140-400) 


 211 x10^3/uL


(140-400) 253 x10^3/uL


(140-400)


 


Sodium Level


 138 mmol/L


(136-145) 


 


 138 mmol/L


(136-145)


 


Potassium Level


 4.3 mmol/L


(3.5-5.1) 


 


 3.9 mmol/L


(3.5-5.1)


 


Chloride Level


 107 mmol/L


() 


 


 104 mmol/L


()


 


Carbon Dioxide Level


 15 mmol/L


(21-32) 


 


 26 mmol/L


(21-32)


 


Anion Gap 16 (6-14)    8 (6-14) 


 


Blood Urea Nitrogen


 11 mg/dL


(7-20) 


 


 19 mg/dL


(7-20)


 


Creatinine


 0.7 mg/dL


(0.6-1.0) 


 


 0.8 mg/dL


(0.6-1.0)


 


Estimated GFR


(Cockcroft-Gault) 88.9 


 


 


 76.2 





 


Glucose Level


 169 mg/dL


(70-99) 


 


 264 mg/dL


(70-99)


 


Calcium Level


 7.5 mg/dL


(8.5-10.1) 


 


 9.2 mg/dL


(8.5-10.1)


 


Magnesium Level


 1.4 mg/dL


(1.8-2.4) 


 


 2.1 mg/dL


(1.8-2.4)


 


O2 Saturation  99 % (92-99)   


 


Arterial Blood pH


 


 7.25


(7.35-7.45) 


 





 


Arterial Blood pCO2 at


Patient Temp 


 32 mmHg


(35-46) 


 





 


Arterial Blood pO2 at Patient


Temp 


 374 mmHg


() 


 





 


Arterial Blood HCO3


 


 13 mmol/L


(21-28) 


 





 


Arterial Blood Base Excess


 


 -13 mmol/L


(-3-3) 


 





 


FiO2  80%+5   


 


Neutrophils (%) (Auto)    85 % (31-73) 


 


Lymphocytes (%) (Auto)    8 % (24-48) 


 


Monocytes (%) (Auto)    6 % (0-9) 


 


Eosinophils (%) (Auto)    0 % (0-3) 


 


Basophils (%) (Auto)    0 % (0-3) 


 


Neutrophils # (Auto)


 


 


 


 16.1 x10^3/uL


(1.8-7.7)


 


Lymphocytes # (Auto)


 


 


 


 1.5 x10^3/uL


(1.0-4.8)


 


Monocytes # (Auto)


 


 


 


 1.2 x10^3/uL


(0.0-1.1)


 


Eosinophils # (Auto)


 


 


 


 0.0 x10^3/uL


(0.0-0.7)


 


Basophils # (Auto)


 


 


 


 0.1 x10^3/uL


(0.0-0.2)


 


Phosphorus Level


 


 


 


 3.5 mg/dL


(2.6-4.7)


 


Test


 7/1/20


06:06 7/1/20


08:30 7/1/20


11:55 7/1/20


16:30


 


Glucose (Fingerstick)


 249 mg/dL


(70-99) 


 235 mg/dL


(70-99) 





 


O2 Saturation  98 % (92-99)  97 % (92-99)  


 


Arterial Blood pH


 


 7.60


(7.35-7.45) 7.38


(7.35-7.45) 





 


Arterial Blood pCO2 at


Patient Temp 


 22 mmHg


(35-46) 38 mmHg


(35-46) 





 


Arterial Blood pO2 at Patient


Temp 


 120 mmHg


() 111 mmHg


() 





 


Arterial Blood HCO3


 


 21 mmol/L


(21-28) 22 mmol/L


(21-28) 





 


Arterial Blood Base Excess


 


 0 mmol/L


(-3-3) -3 mmol/L


(-3-3) 





 


FiO2  40  40  


 


Hemoglobin


 


 


 


 7.3 g/dL


(12.0-15.5)


 


Hematocrit


 


 


 


 21.4 %


(36.0-47.0)


 


Mean Corpuscular Hemoglobin


Concent 


 


 


 34 g/dL


(31-37)


 


Test


 7/1/20


17:49 7/2/20


00:19 7/2/20


06:09 7/2/20


06:10


 


Glucose (Fingerstick)


 201 mg/dL


(70-99) 200 mg/dL


(70-99) 157 mg/dL


(70-99) 





 


White Blood Count


 


 


 


 38.3 x10^3/uL


(4.0-11.0)


 


Red Blood Count


 


 


 


 3.06 x10^6/uL


(3.50-5.40)


 


Hemoglobin


 


 


 


 9.0 g/dL


(12.0-15.5)


 


Hematocrit


 


 


 


 26.8 %


(36.0-47.0)


 


Mean Corpuscular Volume    88 fL () 


 


Mean Corpuscular Hemoglobin    29 pg (25-35) 


 


Mean Corpuscular Hemoglobin


Concent 


 


 


 34 g/dL


(31-37)


 


Red Cell Distribution Width


 


 


 


 14.8 %


(11.5-14.5)


 


Platelet Count


 


 


 


 278 x10^3/uL


(140-400)


 


Sodium Level


 


 


 


 134 mmol/L


(136-145)


 


Potassium Level


 


 


 


 4.0 mmol/L


(3.5-5.1)


 


Chloride Level


 


 


 


 103 mmol/L


()


 


Carbon Dioxide Level


 


 


 


 28 mmol/L


(21-32)


 


Anion Gap    3 (6-14) 


 


Blood Urea Nitrogen


 


 


 


 24 mg/dL


(7-20)


 


Creatinine


 


 


 


 0.7 mg/dL


(0.6-1.0)


 


Estimated GFR


(Cockcroft-Gault) 


 


 


 88.9 





 


Glucose Level


 


 


 


 178 mg/dL


(70-99)


 


Calcium Level


 


 


 


 8.9 mg/dL


(8.5-10.1)


 


Phosphorus Level


 


 


 


 4.2 mg/dL


(2.6-4.7)


 


Magnesium Level


 


 


 


 2.1 mg/dL


(1.8-2.4)


 


Test


 7/2/20


08:00 7/2/20


12:43 


 





 


O2 Saturation 98 % (92-99)    


 


Arterial Blood pH


 7.36


(7.35-7.45) 


 


 





 


Arterial Blood pCO2 at


Patient Temp 41 mmHg


(35-46) 


 


 





 


Arterial Blood pO2 at Patient


Temp 127 mmHg


() 


 


 





 


Arterial Blood HCO3


 23 mmol/L


(21-28) 


 


 





 


Arterial Blood Base Excess


 -2 mmol/L


(-3-3) 


 


 





 


FiO2 40    


 


Glucose (Fingerstick)


 


 184 mg/dL


(70-99) 


 











Laboratory Tests








Test


 7/1/20


16:30 7/1/20


17:49 7/2/20


00:19 7/2/20


06:09


 


Hemoglobin


 7.3 g/dL


(12.0-15.5) 


 


 





 


Hematocrit


 21.4 %


(36.0-47.0) 


 


 





 


Mean Corpuscular Hemoglobin


Concent 34 g/dL


(31-37) 


 


 





 


Glucose (Fingerstick)


 


 201 mg/dL


(70-99) 200 mg/dL


(70-99) 157 mg/dL


(70-99)


 


Test


 7/2/20


06:10 7/2/20


08:00 7/2/20


12:43 





 


White Blood Count


 38.3 x10^3/uL


(4.0-11.0) 


 


 





 


Red Blood Count


 3.06 x10^6/uL


(3.50-5.40) 


 


 





 


Hemoglobin


 9.0 g/dL


(12.0-15.5) 


 


 





 


Hematocrit


 26.8 %


(36.0-47.0) 


 


 





 


Mean Corpuscular Volume 88 fL ()    


 


Mean Corpuscular Hemoglobin 29 pg (25-35)    


 


Mean Corpuscular Hemoglobin


Concent 34 g/dL


(31-37) 


 


 





 


Red Cell Distribution Width


 14.8 %


(11.5-14.5) 


 


 





 


Platelet Count


 278 x10^3/uL


(140-400) 


 


 





 


Sodium Level


 134 mmol/L


(136-145) 


 


 





 


Potassium Level


 4.0 mmol/L


(3.5-5.1) 


 


 





 


Chloride Level


 103 mmol/L


() 


 


 





 


Carbon Dioxide Level


 28 mmol/L


(21-32) 


 


 





 


Anion Gap 3 (6-14)    


 


Blood Urea Nitrogen


 24 mg/dL


(7-20) 


 


 





 


Creatinine


 0.7 mg/dL


(0.6-1.0) 


 


 





 


Estimated GFR


(Cockcroft-Gault) 88.9 


 


 


 





 


Glucose Level


 178 mg/dL


(70-99) 


 


 





 


Calcium Level


 8.9 mg/dL


(8.5-10.1) 


 


 





 


Phosphorus Level


 4.2 mg/dL


(2.6-4.7) 


 


 





 


Magnesium Level


 2.1 mg/dL


(1.8-2.4) 


 


 





 


O2 Saturation  98 % (92-99)   


 


Arterial Blood pH


 


 7.36


(7.35-7.45) 


 





 


Arterial Blood pCO2 at


Patient Temp 


 41 mmHg


(35-46) 


 





 


Arterial Blood pO2 at Patient


Temp 


 127 mmHg


() 


 





 


Arterial Blood HCO3


 


 23 mmol/L


(21-28) 


 





 


Arterial Blood Base Excess


 


 -2 mmol/L


(-3-3) 


 





 


FiO2  40   


 


Glucose (Fingerstick)


 


 


 184 mg/dL


(70-99) 











Microbiology


6/30/20 Gram Stain - Final, Resulted


          


6/30/20 Aerobic and Anaerobic Culture, Resulted


          Pending


6/28/20 Blood Culture - Preliminary, Resulted


          NO GROWTH AFTER 3 DAYS


6/15/20 Gram Stain - Final, Complete


          


6/15/20 Aerobic and Anaerobic Culture - Final, Complete


          


6/13/20 Gram Stain Evaluation - Final, Complete


          


6/13/20 Respiratory Culture - Final, Complete


          


6/13/20 Antimicrobic Susceptibility - Final, Complete


          


6/7/20 Urine Culture - Final, Complete


         


5/30/20 Gram Stain - Final, Complete


          


5/30/20 Aerobic Culture - Final, Complete


Medications





Current Medications


Sodium Chloride 1,000 ml @  1,000 mls/hr Q1H IV  Last administered on 3/16/20at 

03:00;  Start 3/16/20 at 03:00;  Stop 3/16/20 at 03:59;  Status DC


Ondansetron HCl (Zofran) 4 mg 1X  ONCE IVP  Last administered on 3/16/20at 

03:27;  Start 3/16/20 at 03:00;  Stop 3/16/20 at 03:01;  Status DC


Morphine Sulfate (Morphine Sulfate) 4 mg 1X  ONCE IV ;  Start 3/16/20 at 03:00; 

Stop 3/16/20 at 03:01;  Status Cancel


Ketorolac Tromethamine (Toradol 30mg Vial) 30 mg 1X  ONCE IV  Last administered 

on 3/16/20at 02:54;  Start 3/16/20 at 03:00;  Stop 3/16/20 at 03:01;  Status DC


Fentanyl Citrate (Fentanyl 2ml Vial) 25 mcg 1X  ONCE IVP  Last administered on 

3/16/20at 03:23;  Start 3/16/20 at 03:30;  Stop 3/16/20 at 03:31;  Status DC


Fentanyl Citrate (Fentanyl 2ml Vial) 100 mcg STK-MED ONCE .ROUTE ;  Start 

3/16/20 at 03:18;  Stop 3/16/20 at 03:18;  Status DC


Iohexol (Omnipaque 350 Mg/ml) 90 ml 1X  ONCE IV  Last administered on 3/16/20at 

03:25;  Start 3/16/20 at 03:30;  Stop 3/16/20 at 03:31;  Status DC


Info (CONTRAST GIVEN -- Rx MONITORING) 1 each PRN DAILY  PRN MC SEE COMMENTS;  

Start 3/16/20 at 03:30;  Stop 3/18/20 at 03:29;  Status DC


Hydromorphone HCl (Dilaudid) 0.5 mg 1X  ONCE IV  Last administered on 3/16/20at 

03:55;  Start 3/16/20 at 04:30;  Stop 3/16/20 at 04:32;  Status DC


Ondansetron HCl (Zofran) 4 mg PRN Q8HRS  PRN IV NAUSEA/VOMITING 1ST CHOICE;  

Start 3/16/20 at 05:00;  Stop 3/16/20 at 09:27;  Status DC


Morphine Sulfate (Morphine Sulfate) 2 mg PRN Q2HR  PRN IV SEVERE PAIN 7-10 Last 

administered on 3/17/20at 12:26;  Start 3/16/20 at 05:00;  Stop 3/17/20 at 

14:15;  Status DC


Sodium Chloride 1,000 ml @  125 mls/hr Q8H IV  Last administered on 3/16/20at 

20:56;  Start 3/16/20 at 05:00;  Stop 3/17/20 at 04:59;  Status DC


Hydromorphone HCl (Dilaudid) 0.5 mg PRN Q3HRS  PRN IV SEVERE PAIN 7-10 Last 

administered on 3/17/20at 10:06;  Start 3/16/20 at 05:00;  Stop 3/17/20 at 

12:01;  Status DC


Piperacillin Sod/ Tazobactam Sod 4.5 gm/Sodium Chloride 100 ml @  200 mls/hr 1X 

ONCE IV  Last administered on 3/16/20at 05:44;  Start 3/16/20 at 06:00;  Stop 

3/16/20 at 06:29;  Status DC


Ondansetron HCl (Zofran) 4 mg PRN Q4HRS  PRN IV NAUSEA/VOMITING 1ST CHOICE Last 

administered on 6/27/20at 13:37;  Start 3/16/20 at 09:30


Insulin Human Lispro (HumaLOG) 0-9 UNITS Q6HRS SQ  Last administered on 7/2/20at

12:44;  Start 3/16/20 at 09:30


Dextrose (Dextrose 50%-Water Syringe) 12.5 gm PRN Q15MIN  PRN IV SEE COMMENTS;  

Start 3/16/20 at 09:30


Pantoprazole Sodium (PROTONIX VIAL for IV PUSH) 40 mg DAILYAC IVP  Last 

administered on 7/2/20at 08:51;  Start 3/16/20 at 11:30


Prochlorperazine Edisylate (Compazine) 10 mg PRN Q6HRS  PRN IV NAUSEA/VOMITING, 

2nd CHOICE Last administered on 6/27/20at 10:53;  Start 3/16/20 at 17:45


Atenolol (Tenormin) 100 mg DAILY PO ;  Start 3/17/20 at 09:00;  Stop 3/16/20 at 

20:08;  Status DC


Metoprolol Tartrate (Lopressor Vial) 2.5 mg Q6HRS IVP  Last administered on 

3/17/20at 05:51;  Start 3/16/20 at 20:15;  Stop 3/17/20 at 10:02;  Status DC


Metoprolol Tartrate (Lopressor Vial) 5 mg Q6HRS IVP  Last administered on 

3/26/20at 00:12;  Start 3/17/20 at 10:15;  Stop 3/28/20 at 08:48;  Status DC


Hydromorphone HCl (Dilaudid) 1 mg PRN Q3HRS  PRN IV SEVERE PAIN 7-10 Last 

administered on 3/23/20at 05:13;  Start 3/17/20 at 12:00;  Stop 3/31/20 at 

00:25;  Status DC


Lidocaine HCl (Buffered Lidocaine 1%) 3 ml STK-MED ONCE .ROUTE ;  Start 3/17/20 

at 12:55;  Stop 3/17/20 at 12:56;  Status DC


Albumin Human 500 ml @  125 mls/hr 1X  ONCE IV  Last administered on 3/17/20at 

14:33;  Start 3/17/20 at 14:30;  Stop 3/17/20 at 18:32;  Status DC


Norepinephrine Bitartrate 8 mg/ Dextrose 258 ml @  17.299 mls/ hr CONT  PRN IV 

PER PROTOCOL Last administered on 4/14/20at 12:48;  Start 3/17/20 at 15:30;  

Stop 4/17/20 at 09:19;  Status DC


Sodium Chloride 1,000 ml @  125 mls/hr Q8H IV  Last administered on 3/17/20at 

21:04;  Start 3/17/20 at 16:00;  Stop 3/18/20 at 02:42;  Status DC


Albumin Human 500 ml @  125 mls/hr PRN BID  PRN IV After every 2L NSS & BP < 

90mm Last administered on 6/30/20at 16:06;  Start 3/17/20 at 16:00


Iohexol (Omnipaque 300 Mg/ml) 60 ml 1X  ONCE IV  Last administered on 3/17/20at 

17:20;  Start 3/17/20 at 17:00;  Stop 3/17/20 at 17:01;  Status DC


Info (CONTRAST GIVEN -- Rx MONITORING) 1 each PRN DAILY  PRN MC SEE COMMENTS;  

Start 3/17/20 at 17:00;  Stop 3/19/20 at 16:59;  Status DC


Meropenem 1 gm/ Sodium Chloride 100 ml @  200 mls/hr Q8HRS IV  Last administered

on 3/18/20at 05:45;  Start 3/17/20 at 20:00;  Stop 3/18/20 at 08:48;  Status DC


Furosemide (Lasix) 40 mg 1X  ONCE IVP  Last administered on 3/17/20at 22:12;  

Start 3/17/20 at 22:30;  Stop 3/17/20 at 22:31;  Status DC


Calcium Chloride 1000 mg/Sodium Chloride 110 ml @  220 mls/hr 1X  ONCE IV  Last 

administered on 3/17/20at 22:11;  Start 3/17/20 at 22:30;  Stop 3/17/20 at 

22:59;  Status DC


Albuterol Sulfate (Ventolin Neb Soln) 2.5 mg 1X  ONCE NEB  Last administered on 

3/18/20at 00:56;  Start 3/17/20 at 22:30;  Stop 3/17/20 at 22:31;  Status DC


Insulin Human Regular (HumuLIN R VIAL) 5 unit 1X  ONCE IV  Last administered on 

3/17/20at 22:14;  Start 3/17/20 at 22:30;  Stop 3/17/20 at 22:31;  Status DC


Magnesium Sulfate 50 ml @ 25 mls/hr 1X  ONCE IV  Last administered on 3/18/20at 

02:57;  Start 3/18/20 at 03:00;  Stop 3/18/20 at 04:59;  Status DC


Calcium Gluconate 1000 mg/Sodium Chloride 110 ml @  220 mls/hr 1X  ONCE IV  Last

administered on 3/18/20at 02:46;  Start 3/18/20 at 03:00;  Stop 3/18/20 at 

03:29;  Status DC


Sodium Chloride 1,000 ml @  200 mls/hr Q5H IV  Last administered on 3/18/20at 

02:46;  Start 3/18/20 at 03:00;  Stop 3/18/20 at 10:21;  Status DC


Calcium Gluconate 1000 mg/Sodium Chloride 110 ml @  220 mls/hr 1X  ONCE IV  Last

administered on 3/18/20at 03:21;  Start 3/18/20 at 03:30;  Stop 3/18/20 at 

03:59;  Status DC


Sodium Bicarbonate 50 meq/Sodium Chloride 1,050 ml @  75 mls/hr Q14H IV  Last 

administered on 3/22/20at 21:10;  Start 3/18/20 at 07:30;  Stop 3/23/20 at 

10:28;  Status DC


Calcium Gluconate 2000 mg/Sodium Chloride 120 ml @  220 mls/hr 1X  ONCE IV  Last

administered on 3/18/20at 09:05;  Start 3/18/20 at 07:30;  Stop 3/18/20 at 08:02

;  Status DC


Lidocaine HCl (Xylocaine-Mpf 1% 2ml Vial) 2 ml STK-MED ONCE .ROUTE ;  Start 

3/18/20 at 08:47;  Stop 3/18/20 at 08:47;  Status DC


Meropenem 500 mg/ Sodium Chloride 50 ml @  100 mls/hr Q12HR IV  Last 

administered on 3/23/20at 21:01;  Start 3/18/20 at 18:00;  Stop 3/24/20 at 

07:58;  Status DC


Lidocaine HCl (Buffered Lidocaine 1%) 3 ml STK-MED ONCE .ROUTE ;  Start 3/18/20 

at 09:46;  Stop 3/18/20 at 09:46;  Status DC


Lidocaine HCl (Buffered Lidocaine 1%) 6 ml 1X  ONCE INJ  Last administered on 

3/18/20at 10:26;  Start 3/18/20 at 10:15;  Stop 3/18/20 at 10:16;  Status DC


Info (Tpn Per Pharmacy) 1 each PRN DAILY  PRN MC SEE COMMENTS Last administered 

on 7/2/20at 11:40;  Start 3/18/20 at 12:00


Sodium Chloride 1,000 ml @  1,000 mls/hr Q1H PRN IV hypotension;  Start 3/18/20 

at 12:07;  Stop 3/18/20 at 18:06;  Status DC


Diphenhydramine HCl (Benadryl) 25 mg 1X PRN  PRN IV ITCHING;  Start 3/18/20 at 

12:15;  Stop 3/19/20 at 12:14;  Status DC


Diphenhydramine HCl (Benadryl) 25 mg 1X PRN  PRN IV ITCHING;  Start 3/18/20 at 

12:15;  Stop 3/19/20 at 12:14;  Status DC


Sodium Chloride 1,000 ml @  400 mls/hr Q2H30M PRN IV PATENCY;  Start 3/18/20 at 

12:07;  Stop 3/19/20 at 00:06;  Status DC


Info (PHARMACY MONITORING -- do not chart) 1 each PRN DAILY  PRN MC SEE 

COMMENTS;  Start 3/18/20 at 12:15;  Stop 3/20/20 at 08:13;  Status DC


Sodium Chloride 90 meq/Calcium Gluconate 10 meq/ Multivitamins 10 ml/Chromium/ 

Copper/Manganese/ Seleni/Zn 1 ml/ Total Parenteral Nutrition/Amino 

Acids/Dextrose/ Fat Emulsion Intravenous 55.005 ml  @ 2.292 mls/hr TPN  CONT IV 

;  Start 3/18/20 at 22:00;  Stop 3/18/20 at 12:33;  Status DC


Info (Tpn Per Pharmacy) 1 each PRN DAILY  PRN MC SEE COMMENTS;  Start 3/18/20 at

12:30;  Status UNV


Sodium Chloride 90 meq/Calcium Gluconate 10 meq/ Multivitamins 10 ml/Chromium/ 

Copper/Manganese/ Seleni/Zn 0.5 ml/ Total Parenteral Nutrition/Amino 

Acids/Dextrose/ Fat Emulsion Intravenous 1,512 ml @  63 mls/hr TPN  CONT IV  

Last administered on 3/18/20at 22:06;  Start 3/18/20 at 22:00;  Stop 3/19/20 at 

21:59;  Status DC


Calcium Carbonate/ Glycine (Tums) 500 mg PRN AFTMEALHC  PRN PO INDIGESTION;  

Start 3/18/20 at 17:45;  Stop 5/13/20 at 10:25;  Status DC


Calcium Gluconate (Calcium Gluconate) 2,000 mg 1X  ONCE IVP  Last administered 

on 3/19/20at 02:19;  Start 3/19/20 at 02:15;  Stop 3/19/20 at 02:16;  Status DC


Calcium Chloride 3000 mg/Sodium Chloride 1,030 ml @  50 mls/hr [id number] IV  Last 

administered on 3/21/20at 02:17;  Start 3/19/20 at 08:00;  Stop 3/21/20 at 

15:23;  Status DC


Lorazepam (Ativan Inj) 1 mg PRN Q4HRS  PRN IVP ANXIETY / AGITATION, 2nd choic 

Last administered on 4/17/20at 03:51;  Start 3/19/20 at 09:00;  Stop 4/17/20 at 

09:19;  Status DC


Sodium Chloride 1,000 ml @  1,000 mls/hr Q1H PRN IV hypotension;  Start 3/19/20 

at 08:56;  Stop 3/19/20 at 14:55;  Status DC


Albumin Human 200 ml @  200 mls/hr 1X PRN  PRN IV Hypotension;  Start 3/19/20 at

09:00;  Stop 3/19/20 at 14:59;  Status DC


Diphenhydramine HCl (Benadryl) 25 mg 1X PRN  PRN IV ITCHING;  Start 3/19/20 at 

09:00;  Stop 3/20/20 at 08:59;  Status DC


Diphenhydramine HCl (Benadryl) 25 mg 1X PRN  PRN IV ITCHING;  Start 3/19/20 at 

09:00;  Stop 3/20/20 at 08:59;  Status DC


Sodium Chloride 1,000 ml @  400 mls/hr Q2H30M PRN IV PATENCY;  Start 3/19/20 at 

08:56;  Stop 3/19/20 at 20:55;  Status DC


Info (PHARMACY MONITORING -- do not chart) 1 each PRN DAILY  PRN MC SEE 

COMMENTS;  Start 3/19/20 at 09:00;  Status UNV


Info (PHARMACY MONITORING -- do not chart) 1 each PRN DAILY  PRN MC SEE 

COMMENTS;  Start 3/19/20 at 09:00;  Stop 3/20/20 at 08:13;  Status DC


Digoxin (Lanoxin) 500 mcg 1X  ONCE IV  Last administered on 3/19/20at 10:04;  

Start 3/19/20 at 10:00;  Stop 3/19/20 at 10:01;  Status DC


Digoxin (Lanoxin) 125 mcg 1X  ONCE IV  Last administered on 3/19/20at 17:10;  

Start 3/19/20 at 18:00;  Stop 3/19/20 at 18:01;  Status DC


Magnesium Sulfate 100 ml @  25 mls/hr 1X  ONCE IV  Last administered on 

3/19/20at 12:48;  Start 3/19/20 at 13:00;  Stop 3/19/20 at 16:59;  Status DC


Sodium Chloride 90 meq/Magnesium Sulfate 10 meq/ Calcium Gluconate 20 meq/ 

Multivitamins 10 ml/Chromium/ Copper/Manganese/ Seleni/Zn 0.5 ml/ Total 

Parenteral Nutrition/Amino Acids/Dextrose/ Fat Emulsion Intravenous 1,512 ml @  

63 mls/hr TPN  CONT IV  Last administered on 3/19/20at 22:25;  Start 3/19/20 at 

22:00;  Stop 3/20/20 at 21:59;  Status DC


Sodium Chloride 1,000 ml @  1,000 mls/hr Q1H PRN IV hypotension;  Start 3/20/20 

at 08:05;  Stop 3/20/20 at 14:04;  Status DC


Albumin Human 200 ml @  200 mls/hr 1X  ONCE IV  Last administered on 3/20/20at 

08:57;  Start 3/20/20 at 08:15;  Stop 3/20/20 at 09:14;  Status DC


Diphenhydramine HCl (Benadryl) 25 mg 1X PRN  PRN IV ITCHING;  Start 3/20/20 at 

08:15;  Stop 3/21/20 at 08:14;  Status DC


Diphenhydramine HCl (Benadryl) 25 mg 1X PRN  PRN IV ITCHING;  Start 3/20/20 at 

08:15;  Stop 3/21/20 at 08:14;  Status DC


Sodium Chloride 1,000 ml @  400 mls/hr Q2H30M PRN IV PATENCY;  Start 3/20/20 at 

08:05;  Stop 3/20/20 at 20:04;  Status DC


Info (PHARMACY MONITORING -- do not chart) 1 each PRN DAILY  PRN MC SEE 

COMMENTS;  Start 3/20/20 at 08:15;  Stop 3/24/20 at 07:57;  Status DC


Sodium Chloride 90 meq/Potassium Chloride 15 meq/ Potassium Phosphate 10 mmol/ 

Magnesium Sulfate 10 meq/Calcium Gluconate 20 meq/ Multivitamins 10 ml/Chromium/

Copper/Manganese/ Seleni/Zn 0.5 ml/ Total Parenteral Nutrition/Amino 

Acids/Dextrose/ Fat Emulsion Intravenous 1,512 ml @  63 mls/hr TPN  CONT IV  

Last administered on 3/20/20at 21:01;  Start 3/20/20 at 22:00;  Stop 3/21/20 at 

21:59;  Status DC


Potassium Chloride/Water 100 ml @  100 mls/hr 1X  ONCE IV  Last administered on 

3/20/20at 14:09;  Start 3/20/20 at 14:00;  Stop 3/20/20 at 14:59;  Status DC


Benzocaine (Hurricaine One) 1 spray 1X  ONCE MM  Last administered on 3/20/20at 

16:38;  Start 3/20/20 at 14:30;  Stop 3/20/20 at 14:31;  Status DC


Lidocaine HCl (Glydo (Lidocaine) Jelly) 1 thomas 1X  ONCE MM  Last administered on 

3/20/20at 16:38;  Start 3/20/20 at 14:30;  Stop 3/20/20 at 14:31;  Status DC


Linezolid/Dextrose 300 ml @  300 mls/hr Q12HR IV  Last administered on 3/26/20at

21:04;  Start 3/20/20 at 20:00;  Stop 3/27/20 at 07:50;  Status DC


Acetaminophen (Tylenol) 650 mg PRN Q6HRS  PRN PO MILD PAIN / TEMP;  Start 

3/21/20 at 03:30;  Stop 3/21/20 at 03:36;  Status DC


Acetaminophen (Tylenol) 650 mg PRN Q6HRS  PRN PEG MILD PAIN / TEMP Last 

administered on 4/16/20at 19:56;  Start 3/21/20 at 03:36;  Stop 5/13/20 at 

10:25;  Status DC


Sodium Chloride 1,000 ml @  1,000 mls/hr Q1H PRN IV hypotension;  Start 3/21/20 

at 07:50;  Stop 3/21/20 at 13:49;  Status DC


Albumin Human 200 ml @  200 mls/hr 1X PRN  PRN IV Hypotension;  Start 3/21/20 at

08:00;  Stop 3/21/20 at 13:59;  Status DC


Sodium Chloride (Normal Saline Flush) 10 ml 1X PRN  PRN IV AP catheter pack;  

Start 3/21/20 at 08:00;  Stop 3/22/20 at 07:59;  Status DC


Sodium Chloride (Normal Saline Flush) 10 ml 1X PRN  PRN IV  catheter pack;  

Start 3/21/20 at 08:00;  Stop 3/22/20 at 07:59;  Status DC


Sodium Chloride 1,000 ml @  400 mls/hr Q2H30M PRN IV PATENCY;  Start 3/21/20 at 

07:50;  Stop 3/21/20 at 19:49;  Status DC


Info (PHARMACY MONITORING -- do not chart) 1 each PRN DAILY  PRN MC SEE 

COMMENTS;  Start 3/21/20 at 08:00;  Status UNV


Info (PHARMACY MONITORING -- do not chart) 1 each PRN DAILY  PRN MC SEE 

COMMENTS;  Start 3/21/20 at 08:00;  Stop 3/23/20 at 08:25;  Status DC


Sodium Chloride 90 meq/Potassium Chloride 15 meq/ Potassium Phosphate 10 mmol/ 

Magnesium Sulfate 10 meq/Calcium Gluconate 20 meq/ Multivitamins 10 ml/Chromium/

Copper/Manganese/ Seleni/Zn 0.5 ml/ Total Parenteral Nutrition/Amino 

Acids/Dextrose/ Fat Emulsion Intravenous 1,512 ml @  63 mls/hr TPN  CONT IV  

Last administered on 3/21/20at 20:57;  Start 3/21/20 at 22:00;  Stop 3/22/20 at 

21:59;  Status DC


Sodium Chloride 90 meq/Potassium Chloride 15 meq/ Potassium Phosphate 15 mmol/ 

Magnesium Sulfate 10 meq/Calcium Gluconate 20 meq/ Multivitamins 10 ml/Chromium/

Copper/Manganese/ Seleni/Zn 0.5 ml/ Total Parenteral Nutrition/Amino 

Acids/Dextrose/ Fat Emulsion Intravenous 1,512 ml @  63 mls/hr TPN  CONT IV ;  

Start 3/22/20 at 22:00;  Stop 3/22/20 at 14:16;  Status DC


Sodium Chloride 90 meq/Potassium Chloride 15 meq/ Potassium Phosphate 15 mmol/ 

Magnesium Sulfate 10 meq/Calcium Gluconate 20 meq/ Multivitamins 10 ml/Chromium/

Copper/Manganese/ Seleni/Zn 0.5 ml/ Total Parenteral Nutrition/Amino 

Acids/Dextrose/ Fat Emulsion Intravenous 1,200 ml @  50 mls/hr TPN  CONT IV ;  

Start 3/22/20 at 22:00;  Stop 3/22/20 at 14:17;  Status DC


Sodium Chloride 90 meq/Potassium Chloride 15 meq/ Potassium Phosphate 10 mmol/ 

Magnesium Sulfate 10 meq/Calcium Gluconate 20 meq/ Multivitamins 10 ml/Chromium/

Copper/Manganese/ Seleni/Zn 0.5 ml/ Total Parenteral Nutrition/Amino 

Acids/Dextrose/ Fat Emulsion Intravenous 1,200 ml @  50 mls/hr TPN  CONT IV  

Last administered on 3/22/20at 23:29;  Start 3/22/20 at 22:00;  Stop 3/23/20 at 

21:59;  Status DC


Sodium Chloride 1,000 ml @  1,000 mls/hr Q1H PRN IV hypotension;  Start 3/23/20 

at 07:28;  Stop 3/23/20 at 13:27;  Status DC


Albumin Human 200 ml @  200 mls/hr 1X  ONCE IV  Last administered on 3/23/20at 

08:51;  Start 3/23/20 at 07:30;  Stop 3/23/20 at 08:29;  Status DC


Diphenhydramine HCl (Benadryl) 25 mg 1X PRN  PRN IV ITCHING;  Start 3/23/20 at 

07:30;  Stop 3/24/20 at 07:29;  Status DC


Diphenhydramine HCl (Benadryl) 25 mg 1X PRN  PRN IV ITCHING;  Start 3/23/20 at 

07:30;  Stop 3/24/20 at 07:29;  Status DC


Sodium Chloride 1,000 ml @  400 mls/hr Q2H30M PRN IV PATENCY;  Start 3/23/20 at 

07:28;  Stop 3/23/20 at 19:27;  Status DC


Info (PHARMACY MONITORING -- do not chart) 1 each PRN DAILY  PRN MC SEE 

COMMENTS;  Start 3/23/20 at 07:30;  Stop 4/3/20 at 13:01;  Status DC


Metronidazole 100 ml @  100 mls/hr Q6HRS IV  Last administered on 4/8/20at 

06:26;  Start 3/23/20 at 08:30;  Stop 4/8/20 at 09:58;  Status DC


Micafungin Sodium 100 mg/Dextrose 100 ml @  100 mls/hr Q24H IV  Last adm

inistered on 4/30/20at 08:18;  Start 3/23/20 at 09:00;  Stop 4/30/20 at 20:58;  

Status DC


Propofol 0 ml @ As Directed STK-MED ONCE IV ;  Start 3/23/20 at 07:53;  Stop 

3/23/20 at 07:53;  Status DC


Etomidate (Amidate) 20 mg STK-MED ONCE IV ;  Start 3/23/20 at 07:53;  Stop 

3/23/20 at 07:54;  Status DC


Midazolam HCl (Versed) 5 mg STK-MED ONCE .ROUTE ;  Start 3/23/20 at 07:57;  Stop

3/23/20 at 07:57;  Status DC


Fentanyl Citrate 30 ml @ 0 mls/hr CONT  PRN IV SEE PROTOCOL Last administered on

4/17/20at 06:12;  Start 3/23/20 at 08:15;  Stop 4/17/20 at 09:19;  Status DC


Artificial Tears (Artificial Tears) 1 drop PRN Q1HR  PRN OU DRY EYE, 1st choice;

 Start 3/23/20 at 08:15;  Stop 4/29/20 at 05:31;  Status DC


Midazolam HCl 50 mg/Sodium Chloride 50 ml @ 0 mls/hr CONT  PRN IV SEE PROTOCOL 

Last administered on 3/26/20at 22:39;  Start 3/23/20 at 08:15;  Stop 3/28/20 at 

15:59;  Status DC


Etomidate (Amidate) 8 mg 1X  ONCE IV  Last administered on 3/23/20at 08:33;  

Start 3/23/20 at 08:30;  Stop 3/23/20 at 08:31;  Status DC


Succinylcholine Chloride (Anectine) 120 mg 1X  ONCE IV  Last administered on 

3/23/20at 08:34;  Start 3/23/20 at 08:30;  Stop 3/23/20 at 08:31;  Status DC


Midazolam HCl (Versed) 5 mg 1X  ONCE IV ;  Start 3/23/20 at 08:30;  Stop 3/23/20

at 08:31;  Status DC


Potassium Chloride 15 meq/ Bicarbonate Dialysis Soln w/ out KCl 5,007.5 ml  @ 

1,000 mls/ hr Q5H1M IV  Last administered on 3/24/20at 11:11;  Start 3/23/20 at 

12:00;  Stop 3/24/20 at 11:15;  Status DC


Potassium Chloride 15 meq/ Bicarbonate Dialysis Soln w/ out KCl 5,007.5 ml  @ 

1,000 mls/ hr Q5H1M IV  Last administered on 3/24/20at 11:12;  Start 3/23/20 at 

12:00;  Stop 3/24/20 at 11:17;  Status DC


Potassium Chloride 15 meq/ Bicarbonate Dialysis Soln w/ out KCl 5,007.5 ml  @ 

1,000 mls/ hr Q5H1M IV  Last administered on 3/24/20at 11:11;  Start 3/23/20 at 

12:00;  Stop 3/24/20 at 11:19;  Status DC


Sodium Chloride 90 meq/Potassium Chloride 15 meq/ Potassium Phosphate 10 mmol/ 

Magnesium Sulfate 10 meq/Calcium Gluconate 20 meq/ Multivitamins 10 ml/Chromium/

Copper/Manganese/ Seleni/Zn 0.5 ml/ Total Parenteral Nutrition/Amino 

Acids/Dextrose/ Fat Emulsion Intravenous 1,400 ml @  58.333 mls/ hr TPN  CONT IV

 Last administered on 3/23/20at 21:42;  Start 3/23/20 at 22:00;  Stop 3/24/20 at

21:59;  Status DC


Heparin Sodium (Porcine) (Heparin Sodium) 5,000 unit Q8HRS SQ  Last administered

on 3/28/20at 05:55;  Start 3/23/20 at 15:00;  Stop 3/28/20 at 13:28;  Status DC


Meropenem 500 mg/ Sodium Chloride 50 ml @  100 mls/hr Q6HRS IV  Last 

administered on 3/25/20at 06:00;  Start 3/24/20 at 09:00;  Stop 3/25/20 at 

07:29;  Status DC


Potassium Phosphate 20 mmol/ Sodium Chloride 106.6667 ml @  51.667 m... 1X  ONCE

IV  Last administered on 3/24/20at 11:22;  Start 3/24/20 at 10:15;  Stop 3/24/20

at 12:18;  Status DC


Acetaminophen (Tylenol Supp) 650 mg PRN Q6HRS  PRN NC MILD PAIN / TEMP > 100.3'F

Last administered on 6/29/20at 18:16;  Start 3/24/20 at 10:30


Potassium Chloride/Water 100 ml @  100 mls/hr Q1H IV  Last administered on 

3/24/20at 12:12;  Start 3/24/20 at 11:00;  Stop 3/24/20 at 12:59;  Status DC


Potassium Chloride 20 meq/ Bicarbonate Dialysis Soln w/ out KCl 5,010 ml @  

1,000 mls/hr Q5H1M IV  Last administered on 3/25/20at 08:48;  Start 3/24/20 at 

12:00;  Stop 3/25/20 at 13:03;  Status DC


Potassium Chloride 20 meq/ Bicarbonate Dialysis Soln w/ out KCl 5,010 ml @  

1,000 mls/hr Q5H1M IV  Last administered on 3/29/20at 14:52;  Start 3/24/20 at 

11:30;  Stop 3/29/20 at 19:59;  Status DC


Potassium Chloride 20 meq/ Bicarbonate Dialysis Soln w/ out KCl 5,010 ml @  

1,000 mls/hr Q5H1M IV  Last administered on 3/29/20at 14:53;  Start 3/24/20 at 

11:30;  Stop 3/29/20 at 19:59;  Status DC


Sodium Chloride 90 meq/Potassium Chloride 15 meq/ Potassium Phosphate 15 mmol/ 

Magnesium Sulfate 10 meq/Calcium Gluconate 15 meq/ Multivitamins 10 ml/Chromium/

Copper/Manganese/ Seleni/Zn 0.5 ml/ Total Parenteral Nutrition/Amino 

Acids/Dextrose/ Fat Emulsion Intravenous 1,400 ml @  58.333 mls/ hr TPN  CONT IV

 Last administered on 3/24/20at 22:17;  Start 3/24/20 at 22:00;  Stop 3/25/20 at

21:59;  Status DC


Cefepime HCl (Maxipime) 2 gm Q12HR IVP  Last administered on 4/7/20at 20:56;  

Start 3/25/20 at 09:00;  Stop 4/8/20 at 09:58;  Status DC


Daptomycin 500 mg/ Sodium Chloride 50 ml @  100 mls/hr Q48H IV  Last 

administered on 4/10/20at 09:57;  Start 3/25/20 at 08:30;  Stop 4/10/20 at 

10:07;  Status DC


Lidocaine HCl (Buffered Lidocaine 1%) 3 ml 1X  ONCE INJ  Last administered on 

3/25/20at 10:27;  Start 3/25/20 at 10:30;  Stop 3/25/20 at 10:31;  Status DC


Potassium Phosphate 20 mmol/ Sodium Chloride 106.6667 ml @  51.667 m... 1X  ONCE

IV  Last administered on 3/25/20at 12:51;  Start 3/25/20 at 13:00;  Stop 3/25/20

at 15:03;  Status DC


Sodium Chloride 90 meq/Potassium Chloride 15 meq/ Potassium Phosphate 18 mmol/ 

Magnesium Sulfate 8 meq/Calcium Gluconate 15 meq/ Multivitamins 10 ml/Chromium/ 

Copper/Manganese/ Seleni/Zn 0.5 ml/ Total Parenteral Nutrition/Amino 

Acids/Dextrose/ Fat Emulsion Intravenous 1,400 ml @  58.333 mls/ hr TPN  CONT IV

 Last administered on 3/25/20at 22:16;  Start 3/25/20 at 22:00;  Stop 3/26/20 at

21:59;  Status DC


Potassium Chloride 20 meq/ Bicarbonate Dialysis Soln w/ out KCl 5,010 ml @  

1,000 mls/hr Q5H1M IV  Last administered on 3/29/20at 14:54;  Start 3/25/20 at 

16:00;  Stop 3/29/20 at 19:59;  Status DC


Multi-Ingred Cream/Lotion/Oil/ Oint (Artificial Tears Eye Ointment) 1 thomas PRN 

Q1HR  PRN OU DRY EYE, 2nd choice Last administered on 4/13/20at 08:19;  Start 

3/25/20 at 17:30;  Stop 6/3/20 at 14:39;  Status DC


Sodium Chloride 90 meq/Potassium Chloride 15 meq/ Potassium Phosphate 18 mmol/ 

Magnesium Sulfate 8 meq/Calcium Gluconate 15 meq/ Multivitamins 10 ml/Chromium/ 

Copper/Manganese/ Seleni/Zn 0.5 ml/ Total Parenteral Nutrition/Amino 

Acids/Dextrose/ Fat Emulsion Intravenous 1,400 ml @  58.333 mls/ hr TPN  CONT IV

 Last administered on 3/26/20at 22:00;  Start 3/26/20 at 22:00;  Stop 3/27/20 at

21:59;  Status DC


Albumin Human 500 ml @  125 mls/hr 1X  ONCE IV ;  Start 3/26/20 at 14:15;  Stop 

3/26/20 at 18:14;  Status DC


Sodium Chloride 90 meq/Potassium Chloride 15 meq/ Potassium Phosphate 18 mmol/ 

Magnesium Sulfate 8 meq/Calcium Gluconate 15 meq/ Multivitamins 10 ml/Chromium/ 

Copper/Manganese/ Seleni/Zn 0.5 ml/ Insulin Human Regular 10 unit/ Total 

Parenteral Nutrition/Amino Acids/Dextrose/ Fat Emulsion Intravenous 1,400 ml @  

58.333 mls/ hr TPN  CONT IV  Last administered on 3/27/20at 21:43;  Start 

3/27/20 at 22:00;  Stop 3/28/20 at 21:59;  Status DC


Lidocaine HCl (Buffered Lidocaine 1%) 3 ml STK-MED ONCE .ROUTE ;  Start 3/25/20 

at 10:00;  Stop 3/27/20 at 13:57;  Status DC


Midazolam HCl 100 mg/Sodium Chloride 100 ml @ 7 mls/hr CONT  PRN IV SEE PROTOCOL

Last administered on 4/8/20at 15:35;  Start 3/28/20 at 16:00;  Stop 6/3/20 at 

14:38;  Status DC


Sodium Chloride 90 meq/Potassium Chloride 15 meq/ Potassium Phosphate 18 mmol/ 

Magnesium Sulfate 8 meq/Calcium Gluconate 15 meq/ Multivitamins 10 ml/Chromium/ 

Copper/Manganese/ Seleni/Zn 0.5 ml/ Insulin Human Regular 15 unit/ Total 

Parenteral Nutrition/Amino Acids/Dextrose/ Fat Emulsion Intravenous 1,400 ml @  

58.333 mls/ hr TPN  CONT IV  Last administered on 3/28/20at 20:34;  Start 

3/28/20 at 22:00;  Stop 3/29/20 at 21:59;  Status DC


Info ([facility] Electrolyte Protocol) 1 ea CONT PRN  PRN MC PER PROTOCOL;  Start 

3/29/20 at 13:15


Sodium Chloride 90 meq/Potassium Chloride 15 meq/ Potassium Phosphate 18 mmol/ 

Magnesium Sulfate 8 meq/Calcium Gluconate 15 meq/ Multivitamins 10 ml/Chromium/ 

Copper/Manganese/ Seleni/Zn 0.5 ml/ Insulin Human Regular 15 unit/ Total 

Parenteral Nutrition/Amino Acids/Dextrose/ Fat Emulsion Intravenous 1,400 ml @  

58.333 mls/ hr TPN  CONT IV  Last administered on 3/29/20at 22:05;  Start 

3/29/20 at 22:00;  Stop 3/30/20 at 21:59;  Status DC


Potassium Chloride 15 meq/ Bicarbonate Dialysis Soln w/ out KCl 5,007.5 ml  @ 

1,000 mls/ hr Q5H1M IV  Last administered on 4/1/20at 18:14;  Start 3/29/20 at 

20:00;  Stop 4/2/20 at 13:08;  Status DC


Potassium Chloride 15 meq/ Bicarbonate Dialysis Soln w/ out KCl 5,007.5 ml  @ 

1,000 mls/ hr Q5H1M IV  Last administered on 4/1/20at 18:14;  Start 3/29/20 at 

20:00;  Stop 4/2/20 at 13:08;  Status DC


Potassium Chloride 15 meq/ Bicarbonate Dialysis Soln w/ out KCl 5,007.5 ml  @ 

1,000 mls/ hr Q5H1M IV  Last administered on 4/1/20at 18:14;  Start 3/29/20 at 

20:00;  Stop 4/2/20 at 13:08;  Status DC


Iohexol (Omnipaque 240 Mg/ml) 30 ml 1X  ONCE PO  Last administered on 3/30/20at 

11:30;  Start 3/30/20 at 11:30;  Stop 3/30/20 at 11:33;  Status DC


Info (CONTRAST GIVEN -- Rx MONITORING) 1 each PRN DAILY  PRN MC SEE COMMENTS;  

Start 3/30/20 at 11:45;  Stop 4/1/20 at 11:44;  Status DC


Sodium Chloride 90 meq/Potassium Chloride 15 meq/ Potassium Phosphate 18 mmol/ 

Magnesium Sulfate 8 meq/Calcium Gluconate 15 meq/ Multivitamins 10 ml/Chromium/ 

Copper/Manganese/ Seleni/Zn 0.5 ml/ Insulin Human Regular 15 unit/ Total 

Parenteral Nutrition/Amino Acids/Dextrose/ Fat Emulsion Intravenous 1,400 ml @  

58.333 mls/ hr TPN  CONT IV  Last administered on 3/30/20at 21:47;  Start 

3/30/20 at 22:00;  Stop 3/31/20 at 21:59;  Status DC


Sodium Chloride 90 meq/Potassium Chloride 15 meq/ Potassium Phosphate 18 mmol/ 

Magnesium Sulfate 8 meq/Calcium Gluconate 15 meq/ Multivitamins 10 ml/Chromium/ 

Copper/Manganese/ Seleni/Zn 0.5 ml/ Insulin Human Regular 20 unit/ Total 

Parenteral Nutrition/Amino Acids/Dextrose/ Fat Emulsion Intravenous 1,400 ml @  

58.333 mls/ hr TPN  CONT IV  Last administered on 3/31/20at 21:36;  Start 3/31

/20 at 22:00;  Stop 4/1/20 at 21:59;  Status DC


Alteplase, Recombinant (Cathflo For Central Catheter Clearance) 1 mg 1X  ONCE IN

T CAT  Last administered on 3/31/20at 20:03;  Start 3/31/20 at 19:30;  Stop 

3/31/20 at 19:46;  Status DC


Alteplase, Recombinant (Cathflo For Central Catheter Clearance) 1 mg 1X  ONCE 

INT CAT  Last administered on 3/31/20at 22:05;  Start 3/31/20 at 22:00;  Stop 

3/31/20 at 22:01;  Status DC


Sodium Chloride 90 meq/Potassium Chloride 15 meq/ Potassium Phosphate 18 mmol/ 

Magnesium Sulfate 8 meq/Calcium Gluconate 15 meq/ Multivitamins 10 ml/Chromium/ 

Copper/Manganese/ Seleni/Zn 0.5 ml/ Insulin Human Regular 20 unit/ Total 

Parenteral Nutrition/Amino Acids/Dextrose/ Fat Emulsion Intravenous 1,400 ml @  

58.333 mls/ hr TPN  CONT IV  Last administered on 4/1/20at 21:30;  Start 4/1/20 

at 22:00;  Stop 4/2/20 at 21:59;  Status DC


Dexmedetomidine HCl 400 mcg/ Sodium Chloride 100 ml @ 0 mls/hr CONT  PRN IV 

ANXIETY / AGITATION Last administered on 5/30/20at 12:57;  Start 4/2/20 at 

08:15;  Stop 5/30/20 at 18:31;  Status DC


Sodium Chloride 500 ml @  500 mls/hr 1X PRN  PRN IV ELEVATED BP, SEE COMMENTS;  

Start 4/2/20 at 08:15


Atropine Sulfate (ATROPINE 0.5mg SYRINGE) 0.5 mg PRN Q5MIN  PRN IV SEE COMMENTS;

 Start 4/2/20 at 08:15


Furosemide (Lasix) 20 mg 1X  ONCE IVP  Last administered on 4/2/20at 08:19;  

Start 4/2/20 at 08:15;  Stop 4/2/20 at 08:16;  Status DC


Lidocaine HCl (Buffered Lidocaine 1%) 3 ml STK-MED ONCE .ROUTE ;  Start 4/2/20 

at 08:39;  Stop 4/2/20 at 08:39;  Status DC


Lidocaine HCl (Buffered Lidocaine 1%) 6 ml 1X  ONCE INJ  Last administered on 

4/2/20at 09:05;  Start 4/2/20 at 09:00;  Stop 4/2/20 at 09:06;  Status DC


Sodium Chloride 90 meq/Potassium Chloride 15 meq/ Potassium Phosphate 18 mmol/ 

Magnesium Sulfate 8 meq/Calcium Gluconate 15 meq/ Multivitamins 10 ml/Chromium/ 

Copper/Manganese/ Seleni/Zn 0.5 ml/ Insulin Human Regular 20 unit/ Total 

Parenteral Nutrition/Amino Acids/Dextrose/ Fat Emulsion Intravenous 1,400 ml @  

58.333 mls/ hr TPN  CONT IV  Last administered on 4/2/20at 22:45;  Start 4/2/20 

at 22:00;  Stop 4/3/20 at 21:59;  Status DC


Sodium Chloride 1,000 ml @  1,000 mls/hr Q1H PRN IV hypotension;  Start 4/3/20 

at 07:30;  Stop 4/3/20 at 13:29;  Status DC


Albumin Human 200 ml @  200 mls/hr 1X PRN  PRN IV Hypotension Last administered 

on 4/3/20at 09:36;  Start 4/3/20 at 07:30;  Stop 4/3/20 at 13:29;  Status DC


Sodium Chloride (Normal Saline Flush) 10 ml 1X PRN  PRN IV AP catheter pack;  

Start 4/3/20 at 07:30;  Stop 4/3/20 at 21:29;  Status DC


Sodium Chloride (Normal Saline Flush) 10 ml 1X PRN  PRN IV  catheter pack;  

Start 4/3/20 at 07:30;  Stop 4/4/20 at 07:29;  Status DC


Sodium Chloride 1,000 ml @  400 mls/hr Q2H30M PRN IV PATENCY;  Start 4/3/20 at 

07:30;  Stop 4/3/20 at 19:29;  Status DC


Info (PHARMACY MONITORING -- do not chart) 1 each PRN DAILY  PRN MC SEE 

COMMENTS;  Start 4/3/20 at 07:30;  Stop 4/3/20 at 13:02;  Status DC


Info (PHARMACY MONITORING -- do not chart) 1 each PRN DAILY  PRN MC SEE 

COMMENTS;  Start 4/3/20 at 07:30;  Stop 4/5/20 at 12:45;  Status DC


Sodium Chloride 90 meq/Potassium Chloride 15 meq/ Potassium Phosphate 10 mmol/ 

Magnesium Sulfate 8 meq/Calcium Gluconate 15 meq/ Multivitamins 10 ml/Chromium/ 

Copper/Manganese/ Seleni/Zn 0.5 ml/ Insulin Human Regular 25 unit/ Total 

Parenteral Nutrition/Amino Acids/Dextrose/ Fat Emulsion Intravenous 1,400 ml @  

58.333 mls/ hr TPN  CONT IV  Last administered on 4/3/20at 22:19;  Start 4/3/20 

at 22:00;  Stop 4/4/20 at 21:59;  Status DC


Heparin Sodium (Porcine) (Heparin Sodium) 5,000 unit Q12HR SQ  Last administered

on 4/26/20at 08:59;  Start 4/3/20 at 21:00;  Stop 4/26/20 at 10:05;  Status DC


Ondansetron HCl (Zofran) 4 mg PRN Q6HRS  PRN IV NAUSEA/VOMITING;  Start 4/6/20 

at 07:00;  Stop 4/7/20 at 06:59;  Status DC


Fentanyl Citrate (Fentanyl 2ml Vial) 25 mcg PRN Q5MIN  PRN IV MILD PAIN 1-3;  

Start 4/6/20 at 07:00;  Stop 4/7/20 at 06:59;  Status DC


Fentanyl Citrate (Fentanyl 2ml Vial) 50 mcg PRN Q5MIN  PRN IV MODERATE TO SEVERE

PAIN;  Start 4/6/20 at 07:00;  Stop 4/7/20 at 06:59;  Status DC


Ringer's Solution 1,000 ml @  30 mls/hr Q24H IV ;  Start 4/6/20 at 07:00;  Stop 

4/6/20 at 18:59;  Status DC


Lidocaine HCl (Xylocaine-Mpf 1% 2ml Vial) 2 ml PRN 1X  PRN ID PRIOR TO IV START;

 Start 4/6/20 at 07:00;  Stop 4/7/20 at 06:59;  Status DC


Prochlorperazine Edisylate (Compazine) 5 mg PACU PRN  PRN IV NAUSEA, MRX1;  

Start 4/6/20 at 07:00;  Stop 4/7/20 at 06:59;  Status DC


Sodium Chloride 1,000 ml @  1,000 mls/hr Q1H PRN IV hypotension;  Start 4/4/20 

at 09:10;  Stop 4/4/20 at 15:09;  Status DC


Albumin Human 200 ml @  200 mls/hr 1X PRN  PRN IV Hypotension Last administered 

on 4/4/20at 10:10;  Start 4/4/20 at 09:15;  Stop 4/4/20 at 15:14;  Status DC


Sodium Chloride 1,000 ml @  400 mls/hr Q2H30M PRN IV PATENCY;  Start 4/4/20 at 

09:10;  Stop 4/4/20 at 21:09;  Status DC


Info (PHARMACY MONITORING -- do not chart) 1 each PRN DAILY  PRN MC SEE 

COMMENTS;  Start 4/4/20 at 09:15;  Stop 4/5/20 at 12:45;  Status DC


Info (PHARMACY MONITORING -- do not chart) 1 each PRN DAILY  PRN MC SEE 

COMMENTS;  Start 4/4/20 at 09:15;  Stop 4/5/20 at 12:45;  Status DC


Sodium Chloride 90 meq/Potassium Chloride 15 meq/ Potassium Phosphate 10 mmol/ 

Magnesium Sulfate 8 meq/Calcium Gluconate 15 meq/ Multivitamins 10 ml/Chromium/ 

Copper/Manganese/ Seleni/Zn 0.5 ml/ Insulin Human Regular 25 unit/ Total 

Parenteral Nutrition/Amino Acids/Dextrose/ Fat Emulsion Intravenous 1,400 ml @  

58.333 mls/ hr TPN  CONT IV  Last administered on 4/4/20at 22:10;  Start 4/4/20 

at 22:00;  Stop 4/5/20 at 21:59;  Status DC


Magnesium Sulfate 50 ml @ 25 mls/hr PRN DAILY  PRN IV for Mag < 1.7 on am labs 

Last administered on 6/18/20at 10:57;  Start 4/5/20 at 09:15


Sodium Chloride 90 meq/Potassium Chloride 15 meq/ Potassium Phosphate 10 mmol/ 

Magnesium Sulfate 8 meq/Calcium Gluconate 15 meq/ Multivitamins 10 ml/Chromium/ 

Copper/Manganese/ Seleni/Zn 0.5 ml/ Insulin Human Regular 25 unit/ Total 

Parenteral Nutrition/Amino Acids/Dextrose/ Fat Emulsion Intravenous 1,400 ml @  

58.333 mls/ hr TPN  CONT IV  Last administered on 4/5/20at 21:20;  Start 4/5/20 

at 22:00;  Stop 4/6/20 at 21:59;  Status DC


Sodium Chloride 1,000 ml @  1,000 mls/hr Q1H PRN IV hypotension;  Start 4/5/20 

at 12:23;  Stop 4/5/20 at 18:22;  Status DC


Albumin Human 200 ml @  200 mls/hr 1X  ONCE IV  Last administered on 4/5/20at 

13:34;  Start 4/5/20 at 12:30;  Stop 4/5/20 at 13:29;  Status DC


Diphenhydramine HCl (Benadryl) 25 mg 1X PRN  PRN IV ITCHING;  Start 4/5/20 at 

12:30;  Stop 4/6/20 at 12:29;  Status DC


Diphenhydramine HCl (Benadryl) 25 mg 1X PRN  PRN IV ITCHING;  Start 4/5/20 at 

12:30;  Stop 4/6/20 at 12:29;  Status DC


Info (PHARMACY MONITORING -- do not chart) 1 each PRN DAILY  PRN MC SEE 

COMMENTS;  Start 4/5/20 at 12:30;  Status Cancel


Bupivacaine HCl/ Epinephrine Bitart (Sensorcain-Epi 0.5%-1:027555 Mpf) 30 ml 

STK-MED ONCE .ROUTE  Last administered on 4/6/20at 11:44;  Start 4/6/20 at 

11:00;  Stop 4/6/20 at 11:01;  Status DC


Cellulose (Surgicel Fibrillar 1x2) 1 each STK-MED ONCE .ROUTE ;  Start 4/6/20 at

11:00;  Stop 4/6/20 at 11:01;  Status DC


Sodium Chloride 90 meq/Potassium Chloride 15 meq/ Potassium Phosphate 10 mmol/ 

Magnesium Sulfate 12 meq/Calcium Gluconate 15 meq/ Multivitamins 10 ml/Chromium/

Copper/Manganese/ Seleni/Zn 0.5 ml/ Insulin Human Regular 25 unit/ Total Pare

nteral Nutrition/Amino Acids/Dextrose/ Fat Emulsion Intravenous 1,400 ml @  

58.333 mls/ hr TPN  CONT IV  Last administered on 4/6/20at 22:24;  Start 4/6/20 

at 22:00;  Stop 4/7/20 at 21:59;  Status DC


Propofol 20 ml @ As Directed STK-MED ONCE IV ;  Start 4/6/20 at 11:07;  Stop 

4/6/20 at 11:07;  Status DC


Cellulose (Surgicel Hemostat 4x8) 1 each STK-MED ONCE .ROUTE  Last administered 

on 4/6/20at 11:44;  Start 4/6/20 at 11:55;  Stop 4/6/20 at 11:56;  Status DC


Sevoflurane (Ultane) 60 ml STK-MED ONCE IH ;  Start 4/6/20 at 12:46;  Stop 

4/6/20 at 12:46;  Status DC


Sodium Chloride 1,000 ml @  1,000 mls/hr Q1H PRN IV hypotension;  Start 4/6/20 

at 13:51;  Stop 4/6/20 at 19:50;  Status DC


Albumin Human 200 ml @  200 mls/hr 1X PRN  PRN IV Hypotension Last administered 

on 4/6/20at 14:51;  Start 4/6/20 at 14:00;  Stop 4/6/20 at 19:59;  Status DC


Diphenhydramine HCl (Benadryl) 25 mg 1X PRN  PRN IV ITCHING;  Start 4/6/20 at 

14:00;  Stop 4/7/20 at 13:59;  Status DC


Diphenhydramine HCl (Benadryl) 25 mg 1X PRN  PRN IV ITCHING;  Start 4/6/20 at 

14:00;  Stop 4/7/20 at 13:59;  Status DC


Sodium Chloride 1,000 ml @  400 mls/hr Q2H30M PRN IV PATENCY;  Start 4/6/20 at 

13:51;  Stop 4/7/20 at 01:50;  Status DC


Info (PHARMACY MONITORING -- do not chart) 1 each PRN DAILY  PRN MC SEE COMME

NTS;  Start 4/6/20 at 14:00;  Stop 4/9/20 at 08:16;  Status DC


Heparin Sodium (Porcine) (Hep Lock Adult) 500 unit STK-MED ONCE IVP ;  Start 

4/7/20 at 09:29;  Stop 4/7/20 at 09:30;  Status DC


Sodium Chloride 1,000 ml @  1,000 mls/hr Q1H PRN IV hypotension;  Start 4/7/20 

at 10:43;  Stop 4/7/20 at 16:42;  Status DC


Sodium Chloride 1,000 ml @  400 mls/hr Q2H30M PRN IV PATENCY;  Start 4/7/20 at 

10:43;  Stop 4/7/20 at 22:42;  Status DC


Info (PHARMACY MONITORING -- do not chart) 1 each PRN DAILY  PRN MC SEE MIKEY

TS;  Start 4/7/20 at 10:45;  Status UNV


Info (PHARMACY MONITORING -- do not chart) 1 each PRN DAILY  PRN MC SEE 

COMMENTS;  Start 4/7/20 at 10:45;  Status UNV


Sodium Chloride 90 meq/Potassium Chloride 15 meq/ Magnesium Sulfate 12 

meq/Calcium Gluconate 15 meq/ Multivitamins 10 ml/Chromium/ Copper/Manganese/ 

Seleni/Zn 0.5 ml/ Insulin Human Regular 25 unit/ Total Parenteral Nutrition/A

rajesh Acids/Dextrose/ Fat Emulsion Intravenous 1,400 ml @  58.333 mls/ hr TPN  

CONT IV  Last administered on 4/7/20at 22:13;  Start 4/7/20 at 22:00;  Stop 

4/8/20 at 21:59;  Status DC


Sodium Chloride 1,000 ml @  1,000 mls/hr Q1H PRN IV hypotension;  Start 4/8/20 

at 07:50;  Stop 4/8/20 at 13:49;  Status DC


Albumin Human 200 ml @  200 mls/hr 1X  ONCE IV ;  Start 4/8/20 at 08:00;  Stop 

4/8/20 at 08:53;  Status DC


Diphenhydramine HCl (Benadryl) 25 mg 1X PRN  PRN IV ITCHING;  Start 4/8/20 at 

08:00;  Stop 4/9/20 at 07:59;  Status DC


Diphenhydramine HCl (Benadryl) 25 mg 1X PRN  PRN IV ITCHING;  Start 4/8/20 at 

08:00;  Stop 4/9/20 at 07:59;  Status DC


Info (PHARMACY MONITORING -- do not chart) 1 each PRN DAILY  PRN MC SEE 

COMMENTS;  Start 4/8/20 at 08:00;  Stop 4/9/20 at 08:16;  Status DC


Albumin Human 50 ml @ 50 mls/hr 1X  ONCE IV ;  Start 4/8/20 at 08:53;  Stop 

4/8/20 at 08:56;  Status DC


Albumin Human 200 ml @  50 mls/hr PRN 1X  PRN IV HYPOTENSION Last administered 

on 4/14/20at 11:54;  Start 4/8/20 at 09:00;  Stop 5/21/20 at 11:14;  Status DC


Meropenem 500 mg/ Sodium Chloride 50 ml @  100 mls/hr Q12H IV  Last administered

on 4/28/20at 10:45;  Start 4/8/20 at 10:00;  Stop 4/28/20 at 12:37;  Status DC


Sodium Chloride 90 meq/Magnesium Sulfate 12 meq/ Calcium Gluconate 15 meq/ 

Multivitamins 10 ml/Chromium/ Copper/Manganese/ Seleni/Zn 0.5 ml/ Insulin Human 

Regular 25 unit/ Total Parenteral Nutrition/Amino Acids/Dextrose/ Fat Emulsion 

Intravenous 1,400 ml @  58.333 mls/ hr TPN  CONT IV  Last administered on 

4/8/20at 21:41;  Start 4/8/20 at 22:00;  Stop 4/9/20 at 21:59;  Status DC


Sodium Chloride 1,000 ml @  1,000 mls/hr Q1H PRN IV hypotension;  Start 4/9/20 

at 07:58;  Stop 4/9/20 at 13:57;  Status DC


Albumin Human 200 ml @  200 mls/hr 1X PRN  PRN IV Hypotension Last administered 

on 4/9/20at 09:30;  Start 4/9/20 at 08:00;  Stop 4/9/20 at 13:59;  Status DC


Sodium Chloride 1,000 ml @  400 mls/hr Q2H30M PRN IV PATENCY;  Start 4/9/20 at 

07:58;  Stop 4/9/20 at 19:57;  Status DC


Info (PHARMACY MONITORING -- do not chart) 1 each PRN DAILY  PRN MC SEE 

COMMENTS;  Start 4/9/20 at 08:00;  Status Cancel


Info (PHARMACY MONITORING -- do not chart) 1 each PRN DAILY  PRN MC SEE 

COMMENTS;  Start 4/9/20 at 08:15;  Status UNV


Sodium Chloride 90 meq/Potassium Phosphate 5 mmol/ Magnesium Sulfate 12 

meq/Calcium Gluconate 15 meq/ Multivitamins 10 ml/Chromium/ Copper/Manganese/ 

Seleni/Zn 0.5 ml/ Insulin Human Regular 30 unit/ Total Parenteral 

Nutrition/Amino Acids/Dextrose/ Fat Emulsion Intravenous 1,400 ml @  58.333 mls/

hr TPN  CONT IV  Last administered on 4/9/20at 22:08;  Start 4/9/20 at 22:00;  

Stop 4/10/20 at 21:59;  Status DC


Linezolid/Dextrose 300 ml @  300 mls/hr Q12HR IV  Last administered on 4/20/20at

20:40;  Start 4/10/20 at 11:00;  Stop 4/21/20 at 08:10;  Status DC


Sodium Chloride 90 meq/Potassium Phosphate 15 mmol/ Magnesium Sulfate 12 

meq/Calcium Gluconate 15 meq/ Multivitamins 10 ml/Chromium/ Copper/Manganese/ Se

aram/Zn 0.5 ml/ Insulin Human Regular 30 unit/ Total Parenteral Nutrition/Amino 

Acids/Dextrose/ Fat Emulsion Intravenous 1,400 ml @  58.333 mls/ hr TPN  CONT IV

 Last administered on 4/10/20at 21:49;  Start 4/10/20 at 22:00;  Stop 4/11/20 at

21:59;  Status DC


Sodium Chloride 90 meq/Potassium Phosphate 15 mmol/ Magnesium Sulfate 12 

meq/Calcium Gluconate 15 meq/ Multivitamins 10 ml/Chromium/ Copper/Manganese/ 

Seleni/Zn 0.5 ml/ Insulin Human Regular 40 unit/ Total Parenteral 

Nutrition/Amino Acids/Dextrose/ Fat Emulsion Intravenous 1,400 ml @  58.333 mls/

hr TPN  CONT IV  Last administered on 4/11/20at 21:21;  Start 4/11/20 at 22:00; 

Stop 4/12/20 at 21:59;  Status DC


Sodium Chloride 1,000 ml @  1,000 mls/hr Q1H PRN IV hypotension;  Start 4/11/20 

at 13:26;  Stop 4/11/20 at 19:25;  Status DC


Albumin Human 200 ml @  200 mls/hr 1X PRN  PRN IV Hypotension Last administered 

on 4/11/20at 15:00;  Start 4/11/20 at 13:30;  Stop 4/11/20 at 19:29;  Status DC


Sodium Chloride (Normal Saline Flush) 10 ml 1X PRN  PRN IV AP catheter pack;  

Start 4/11/20 at 13:30;  Stop 4/12/20 at 13:29;  Status DC


Sodium Chloride (Normal Saline Flush) 10 ml 1X PRN  PRN IV  catheter pack;  

Start 4/11/20 at 13:30;  Stop 4/12/20 at 13:29;  Status DC


Sodium Chloride 1,000 ml @  400 mls/hr Q2H30M PRN IV PATENCY;  Start 4/11/20 at 

13:26;  Stop 4/12/20 at 01:25;  Status DC


Info (PHARMACY MONITORING -- do not chart) 1 each PRN DAILY  PRN MC SEE 

COMMENTS;  Start 4/11/20 at 13:30;  Stop 4/11/20 at 13:33;  Status DC


Info (PHARMACY MONITORING -- do not chart) 1 each PRN DAILY  PRN MC SEE 

COMMENTS;  Start 4/11/20 at 13:30;  Stop 4/11/20 at 13:34;  Status DC


Sodium Chloride 90 meq/Potassium Phosphate 19 mmol/ Magnesium Sulfate 12 

meq/Calcium Gluconate 15 meq/ Multivitamins 10 ml/Chromium/ Copper/Manganese/ 

Seleni/Zn 0.5 ml/ Insulin Human Regular 40 unit/ Total Parenteral 

Nutrition/Amino Acids/Dextrose/ Fat Emulsion Intravenous 1,400 ml @  58.333 mls/

hr TPN  CONT IV  Last administered on 4/12/20at 21:54;  Start 4/12/20 at 22:00; 

Stop 4/13/20 at 21:59;  Status DC


Sodium Chloride 1,000 ml @  1,000 mls/hr Q1H PRN IV hypotension;  Start 4/13/20 

at 09:35;  Stop 4/13/20 at 15:34;  Status DC


Albumin Human 200 ml @  200 mls/hr 1X PRN  PRN IV Hypotension;  Start 4/13/20 at

09:45;  Stop 4/13/20 at 15:44;  Status DC


Diphenhydramine HCl (Benadryl) 25 mg 1X PRN  PRN IV ITCHING;  Start 4/13/20 at 

09:45;  Stop 4/14/20 at 09:44;  Status DC


Diphenhydramine HCl (Benadryl) 25 mg 1X PRN  PRN IV ITCHING;  Start 4/13/20 at 

09:45;  Stop 4/14/20 at 09:44;  Status DC


Sodium Chloride 1,000 ml @  400 mls/hr Q2H30M PRN IV PATENCY;  Start 4/13/20 at 

09:35;  Stop 4/13/20 at 21:34;  Status DC


Info (PHARMACY MONITORING -- do not chart) 1 each PRN DAILY  PRN MC SEE 

COMMENTS;  Start 4/13/20 at 09:45;  Status Cancel


Sodium Chloride 100 meq/Potassium Phosphate 19 mmol/ Magnesium Sulfate 12 

meq/Calcium Gluconate 15 meq/ Multivitamins 10 ml/Chromium/ Copper/Manganese/ 

Seleni/Zn 0.5 ml/ Insulin Human Regular 40 unit/ Potassium Chloride 20 meq/ 

Total Parenteral Nutrition/Amino Acids/Dextrose/ Fat Emulsion Intravenous 1,400 

ml @  58.333 mls/ hr TPN  CONT IV  Last administered on 4/13/20at 22:02;  Start 

4/13/20 at 22:00;  Stop 4/14/20 at 21:59;  Status DC


Furosemide (Lasix) 40 mg 1X  ONCE IVP  Last administered on 4/13/20at 14:39;  

Start 4/13/20 at 14:30;  Stop 4/13/20 at 14:31;  Status DC


Metronidazole 100 ml @  100 mls/hr Q8HRS IV  Last administered on 4/21/20at 

06:04;  Start 4/14/20 at 10:00;  Stop 4/21/20 at 08:10;  Status DC


Sodium Chloride 1,000 ml @  1,000 mls/hr Q1H PRN IV hypotension;  Start 4/14/20 

at 08:00;  Stop 4/14/20 at 13:59;  Status DC


Albumin Human 200 ml @  200 mls/hr 1X PRN  PRN IV Hypotension;  Start 4/14/20 at

08:00;  Stop 4/14/20 at 13:59;  Status DC


Sodium Chloride 1,000 ml @  400 mls/hr Q2H30M PRN IV PATENCY;  Start 4/14/20 at 

08:00;  Stop 4/14/20 at 19:59;  Status DC


Info (PHARMACY MONITORING -- do not chart) 1 each PRN DAILY  PRN MC SEE 

COMMENTS;  Start 4/14/20 at 11:30;  Status UNV


Info (PHARMACY MONITORING -- do not chart) 1 each PRN DAILY  PRN MC SEE 

COMMENTS;  Start 4/14/20 at 11:30;  Stop 4/16/20 at 12:13;  Status DC


Sodium Chloride 100 meq/Potassium Phosphate 19 mmol/ Magnesium Sulfate 12 

meq/Calcium Gluconate 15 meq/ Multivitamins 10 ml/Chromium/ Copper/Manganese/ 

Seleni/Zn 0.5 ml/ Insulin Human Regular 40 unit/ Potassium Chloride 20 meq/ 

Total Parenteral Nutrition/Amino Acids/Dextrose/ Fat Emulsion Intravenous 1,400 

ml @  58.333 mls/ hr TPN  CONT IV  Last administered on 4/14/20at 21:52;  Start 

4/14/20 at 22:00;  Stop 4/15/20 at 21:59;  Status DC


Sodium Chloride (Normal Saline Flush) 10 ml QSHIFT  PRN IV AFTER MEDS AND BLOOD 

DRAWS;  Start 4/14/20 at 15:00;  Stop 5/12/20 at 11:27;  Status DC


Sodium Chloride (Normal Saline Flush) 10 ml PRN Q5MIN  PRN IV AFTER MEDS AND 

BLOOD DRAWS;  Start 4/14/20 at 15:00


Sodium Chloride (Normal Saline Flush) 20 ml PRN Q5MIN  PRN IV AFTER MEDS AND 

BLOOD DRAWS;  Start 4/14/20 at 15:00


Sodium Chloride 100 meq/Potassium Phosphate 19 mmol/ Magnesium Sulfate 12 

meq/Calcium Gluconate 15 meq/ Multivitamins 10 ml/Chromium/ Copper/Manganese/ 

Seleni/Zn 0.5 ml/ Insulin Human Regular 40 unit/ Potassium Chloride 20 meq/ 

Total Parenteral Nutrition/Amino Acids/Dextrose/ Fat Emulsion Intravenous 1,400 

ml @  58.333 mls/ hr TPN  CONT IV  Last administered on 4/15/20at 21:20;  Start 

4/15/20 at 22:00;  Stop 4/16/20 at 21:59;  Status DC


Lidocaine HCl (Buffered Lidocaine 1%) 3 ml STK-MED ONCE .ROUTE ;  Start 4/15/20 

at 13:16;  Stop 4/15/20 at 13:16;  Status DC


Lidocaine HCl (Buffered Lidocaine 1%) 6 ml 1X  ONCE INJ  Last administered on 

4/15/20at 13:45;  Start 4/15/20 at 13:30;  Stop 4/15/20 at 13:31;  Status DC


Albumin Human 100 ml @  100 mls/hr 1X  ONCE IV  Last administered on 4/15/20at 

15:41;  Start 4/15/20 at 15:00;  Stop 4/15/20 at 15:59;  Status DC


Albumin Human 50 ml @ 50 mls/hr 1X  ONCE IV  Last administered on 4/15/20at 

15:00;  Start 4/15/20 at 15:00;  Stop 4/15/20 at 15:59;  Status DC


Info (PHARMACY MONITORING -- do not chart) 1 each PRN DAILY  PRN MC SEE 

COMMENTS;  Start 4/16/20 at 11:30;  Status Cancel


Info (PHARMACY MONITORING -- do not chart) 1 each PRN DAILY  PRN MC SEE 

COMMENTS;  Start 4/16/20 at 11:30;  Status UNV


Sodium Chloride 100 meq/Potassium Phosphate 10 mmol/ Magnesium Sulfate 12 

meq/Calcium Gluconate 15 meq/ Multivitamins 10 ml/Chromium/ Copper/Manganese/ 

Seleni/Zn 0.5 ml/ Insulin Human Regular 35 unit/ Potassium Chloride 20 meq/ 

Total Parenteral Nutrition/Amino Acids/Dextrose/ Fat Emulsion Intravenous 1,400 

ml @  58.333 mls/ hr TPN  CONT IV  Last administered on 4/16/20at 22:10;  Start 

4/16/20 at 22:00;  Stop 4/17/20 at 21:59;  Status DC


Sodium Chloride 100 meq/Potassium Phosphate 5 mmol/ Magnesium Sulfate 12 

meq/Calcium Gluconate 15 meq/ Multivitamins 10 ml/Chromium/ Copper/Manganese/ 

Seleni/Zn 0.5 ml/ Insulin Human Regular 35 unit/ Potassium Chloride 20 meq/ 

Total Parenteral Nutrition/Amino Acids/Dextrose/ Fat Emulsion Intravenous 1,400 

ml @  58.333 mls/ hr TPN  CONT IV  Last administered on 4/17/20at 22:59;  Start 

4/17/20 at 22:00;  Stop 4/18/20 at 21:59;  Status DC


Sodium Chloride 1,000 ml @  1,000 mls/hr Q1H PRN IV hypotension;  Start 4/18/20 

at 08:27;  Stop 4/18/20 at 14:26;  Status DC


Albumin Human 200 ml @  200 mls/hr 1X PRN  PRN IV Hypotension Last administered 

on 4/18/20at 09:18;  Start 4/18/20 at 08:30;  Stop 4/18/20 at 14:29;  Status DC


Sodium Chloride 1,000 ml @  400 mls/hr Q2H30M PRN IV PATENCY;  Start 4/18/20 at 

08:27;  Stop 4/18/20 at 20:26;  Status DC


Info (PHARMACY MONITORING -- do not chart) 1 each PRN DAILY  PRN MC SEE 

COMMENTS;  Start 4/18/20 at 08:30;  Status Cancel


Info (PHARMACY MONITORING -- do not chart) 1 each PRN DAILY  PRN MC SEE 

COMMENTS;  Start 4/18/20 at 08:30;  Stop 4/26/20 at 13:10;  Status DC


Sodium Chloride 100 meq/Potassium Chloride 40 meq/ Magnesium Sulfate 15 

meq/Calcium Gluconate 15 meq/ Multivitamins 10 ml/Chromium/ Copper/Manganese/ 

Seleni/Zn 0.5 ml/ Insulin Human Regular 35 unit/ Total Parenteral 

Nutrition/Amino Acids/Dextrose/ Fat Emulsion Intravenous 1,400 ml @  58.333 mls/

hr TPN  CONT IV  Last administered on 4/18/20at 22:00;  Start 4/18/20 at 22:00; 

Stop 4/19/20 at 21:59;  Status DC


Potassium Chloride/Water 100 ml @  100 mls/hr 1X  ONCE IV  Last administered on 

4/18/20at 17:28;  Start 4/18/20 at 14:45;  Stop 4/18/20 at 15:44;  Status DC


Sodium Chloride 100 meq/Potassium Chloride 40 meq/ Magnesium Sulfate 15 

meq/Calcium Gluconate 15 meq/ Multivitamins 10 ml/Chromium/ Copper/Manganese/ 

Seleni/Zn 0.5 ml/ Insulin Human Regular 35 unit/ Total Parenteral 

Nutrition/Amino Acids/Dextrose/ Fat Emulsion Intravenous 1,400 ml @  58.333 mls/

hr TPN  CONT IV  Last administered on 4/19/20at 22:46;  Start 4/19/20 at 22:00; 

Stop 4/20/20 at 21:59;  Status DC


Sodium Chloride 100 meq/Potassium Chloride 40 meq/ Magnesium Sulfate 20 

meq/Calcium Gluconate 15 meq/ Multivitamins 10 ml/Chromium/ Copper/Manganese/ 

Seleni/Zn 0.5 ml/ Insulin Human Regular 35 unit/ Total Parenteral Nutriti

on/Amino Acids/Dextrose/ Fat Emulsion Intravenous 1,400 ml @  58.333 mls/ hr TPN

 CONT IV  Last administered on 4/20/20at 22:31;  Start 4/20/20 at 22:00;  Stop 

4/21/20 at 21:59;  Status DC


Fentanyl Citrate (Fentanyl 2ml Vial) 50 mcg PRN Q2HR  PRN IVP PAIN Last 

administered on 4/27/20at 13:32;  Start 4/20/20 at 21:00;  Stop 4/28/20 at 

12:53;  Status DC


Fentanyl Citrate (Fentanyl 2ml Vial) 25 mcg PRN Q2HR  PRN IVP PAIN;  Start 

4/20/20 at 21:00;  Stop 4/28/20 at 12:54;  Status DC


Enoxaparin Sodium (Lovenox 100mg Syringe) 100 mg Q12HR SQ ;  Start 4/21/20 at 

21:00;  Status UNV


Amino Acids/ Glycerin/ Electrolytes 1,000 ml @  75 mls/hr [id number] IV ;  Start 

4/20/20 at 21:15;  Status UNV


Sodium Chloride 1,000 ml @  1,000 mls/hr Q1H PRN IV hypotension;  Start 4/21/20 

at 07:56;  Stop 4/21/20 at 13:55;  Status DC


Albumin Human 200 ml @  200 mls/hr 1X PRN  PRN IV Hypotension Last administered 

on 4/21/20at 08:40;  Start 4/21/20 at 08:00;  Stop 4/21/20 at 13:59;  Status DC


Sodium Chloride 1,000 ml @  400 mls/hr Q2H30M PRN IV PATENCY;  Start 4/21/20 at 

07:56;  Stop 4/21/20 at 19:55;  Status DC


Info (PHARMACY MONITORING -- do not chart) 1 each PRN DAILY  PRN MC SEE 

COMMENTS;  Start 4/21/20 at 08:00;  Status UNV


Info (PHARMACY MONITORING -- do not chart) 1 each PRN DAILY  PRN MC SEE 

COMMENTS;  Start 4/21/20 at 08:00;  Status UNV


Daptomycin 430 mg/ Sodium Chloride 50 ml @  100 mls/hr Q24H IV  Last adminis

tered on 4/21/20at 12:35;  Start 4/21/20 at 09:00;  Stop 4/21/20 at 12:49;  

Status DC


Sodium Chloride 100 meq/Potassium Chloride 40 meq/ Magnesium Sulfate 20 

meq/Calcium Gluconate 15 meq/ Multivitamins 10 ml/Chromium/ Copper/Manganese/ 

Seleni/Zn 0.5 ml/ Insulin Human Regular 35 unit/ Total Parenteral 

Nutrition/Amino Acids/Dextrose/ Fat Emulsion Intravenous 1,400 ml @  58.333 mls/

hr TPN  CONT IV  Last administered on 4/21/20at 21:26;  Start 4/21/20 at 22:00; 

Stop 4/22/20 at 21:59;  Status DC


Daptomycin 430 mg/ Sodium Chloride 50 ml @  100 mls/hr Q48H IV ;  Start 4/23/20 

at 09:00;  Stop 4/22/20 at 11:55;  Status DC


Sodium Chloride 100 meq/Potassium Chloride 40 meq/ Magnesium Sulfate 20 

meq/Calcium Gluconate 15 meq/ Multivitamins 10 ml/Chromium/ Copper/Manganese/ 

Seleni/Zn 0.5 ml/ Insulin Human Regular 35 unit/ Total Parenteral 

Nutrition/Amino Acids/Dextrose/ Fat Emulsion Intravenous 1,400 ml @  58.333 mls/

hr TPN  CONT IV  Last administered on 4/22/20at 22:27;  Start 4/22/20 at 22:00; 

Stop 4/23/20 at 21:59;  Status DC


Daptomycin 430 mg/ Sodium Chloride 50 ml @  100 mls/hr Q24H IV  Last 

administered on 4/24/20at 15:07;  Start 4/22/20 at 13:00;  Stop 4/25/20 at 

13:15;  Status DC


Sodium Chloride 100 meq/Potassium Chloride 40 meq/ Magnesium Sulfate 20 

meq/Calcium Gluconate 10 meq/ Multivitamins 10 ml/Chromium/ Copper/Manganese/ 

Seleni/Zn 0.5 ml/ Insulin Human Regular 35 unit/ Total Parenteral 

Nutrition/Amino Acids/Dextrose/ Fat Emulsion Intravenous 1,400 ml @  58.333 mls/

hr TPN  CONT IV  Last administered on 4/24/20at 00:06;  Start 4/23/20 at 22:00; 

Stop 4/24/20 at 21:59;  Status DC


Alteplase, Recombinant (Cathflo For Central Catheter Clearance) 1 mg 1X  ONCE 

INT CAT  Last administered on 4/24/20at 11:44;  Start 4/24/20 at 10:45;  Stop 

4/24/20 at 10:46;  Status DC


Ondansetron HCl (Zofran) 4 mg PRN Q6HRS  PRN IV NAUSEA/VOMITING;  Start 4/27/20 

at 07:00;  Stop 4/28/20 at 06:59;  Status DC


Fentanyl Citrate (Fentanyl 2ml Vial) 25 mcg PRN Q5MIN  PRN IV MILD PAIN 1-3;  

Start 4/27/20 at 07:00;  Stop 4/28/20 at 06:59;  Status DC


Fentanyl Citrate (Fentanyl 2ml Vial) 50 mcg PRN Q5MIN  PRN IV MODERATE TO SEVERE

PAIN Last administered on 4/27/20at 10:17;  Start 4/27/20 at 07:00;  Stop 

4/28/20 at 06:59;  Status DC


Ringer's Solution 1,000 ml @  30 mls/hr Q24H IV ;  Start 4/27/20 at 07:00;  Stop

4/27/20 at 18:59;  Status DC


Lidocaine HCl (Xylocaine-Mpf 1% 2ml Vial) 2 ml PRN 1X  PRN ID PRIOR TO IV START;

 Start 4/27/20 at 07:00;  Stop 4/28/20 at 06:59;  Status DC


Prochlorperazine Edisylate (Compazine) 5 mg PACU PRN  PRN IV NAUSEA, MRX1;  

Start 4/27/20 at 07:00;  Stop 4/28/20 at 06:59;  Status DC


Sodium Acetate 50 meq/Potassium Acetate 55 meq/ Magnesium Sulfate 20 meq/Calcium

Gluconate 10 meq/ Multivitamins 10 ml/Chromium/ Copper/Manganese/ Seleni/Zn 0.5 

ml/ Insulin Human Regular 35 unit/ Total Parenteral Nutrition/Amino 

Acids/Dextrose/ Fat Emulsion Intravenous 1,400 ml @  58.333 mls/ hr TPN  CONT IV

;  Start 4/24/20 at 22:00;  Stop 4/24/20 at 14:15;  Status DC


Sodium Acetate 50 meq/Potassium Acetate 55 meq/ Magnesium Sulfate 20 meq/Calcium

Gluconate 10 meq/ Multivitamins 10 ml/Chromium/ Copper/Manganese/ Seleni/Zn 0.5 

ml/ Insulin Human Regular 35 unit/ Total Parenteral Nutrition/Amino 

Acids/Dextrose/ Fat Emulsion Intravenous 1,800 ml @  75 mls/hr TPN  CONT IV  

Last administered on 4/24/20at 22:38;  Start 4/24/20 at 22:00;  Stop 4/25/20 at 

21:59;  Status DC


Sodium Chloride 1,000 ml @  1,000 mls/hr Q1H PRN IV hypotension;  Start 4/24/20 

at 15:31;  Stop 4/24/20 at 21:30;  Status DC


Diphenhydramine HCl (Benadryl) 25 mg 1X PRN  PRN IV ITCHING;  Start 4/24/20 at 

15:45;  Stop 4/25/20 at 15:44;  Status DC


Diphenhydramine HCl (Benadryl) 25 mg 1X PRN  PRN IV ITCHING;  Start 4/24/20 at 

15:45;  Stop 4/25/20 at 15:44;  Status DC


Sodium Chloride 1,000 ml @  400 mls/hr Q2H30M PRN IV PATENCY;  Start 4/24/20 at 

15:31;  Stop 4/25/20 at 03:30;  Status DC


Info (PHARMACY MONITORING -- do not chart) 1 each PRN DAILY  PRN MC SEE COMME

NTS;  Start 4/24/20 at 15:45;  Stop 5/26/20 at 14:14;  Status DC


Sodium Acetate 50 meq/Potassium Acetate 55 meq/ Magnesium Sulfate 20 meq/Calcium

Gluconate 10 meq/ Multivitamins 10 ml/Chromium/ Copper/Manganese/ Seleni/Zn 0.5 

ml/ Insulin Human Regular 35 unit/ Total Parenteral Nutrition/Amino 

Acids/Dextrose/ Fat Emulsion Intravenous 1,800 ml @  75 mls/hr TPN  CONT IV  

Last administered on 4/25/20at 22:03;  Start 4/25/20 at 22:00;  Stop 4/26/20 at 

21:59;  Status DC


Daptomycin 430 mg/ Sodium Chloride 50 ml @  100 mls/hr Q24H IV  Last 

administered on 4/30/20at 13:00;  Start 4/25/20 at 13:00;  Stop 4/30/20 at 

20:58;  Status DC


Heparin Sodium (Porcine) 1000 unit/Sodium Chloride 1,001 ml @  1,001 mls/hr 1X  

ONCE IRR ;  Start 4/27/20 at 06:00;  Stop 4/27/20 at 06:59;  Status DC


Potassium Acetate 55 meq/Magnesium Sulfate 20 meq/ Calcium Gluconate 10 meq/ 

Multivitamins 10 ml/Chromium/ Copper/Manganese/ Seleni/Zn 0.5 ml/ Insulin Human 

Regular 35 unit/ Total Parenteral Nutrition/Amino Acids/Dextrose/ Fat Emulsion 

Intravenous 1,920 ml @  80 mls/hr TPN  CONT IV  Last administered on 4/26/20at 

22:10;  Start 4/26/20 at 22:00;  Stop 4/27/20 at 21:59;  Status DC


Dexamethasone Sodium Phosphate (Decadron) 4 mg STK-MED ONCE .ROUTE ;  Start 

4/27/20 at 10:56;  Stop 4/27/20 at 10:57;  Status DC


Ondansetron HCl (Zofran) 4 mg STK-MED ONCE .ROUTE ;  Start 4/27/20 at 10:56;  

Stop 4/27/20 at 10:57;  Status DC


Rocuronium Bromide (Zemuron) 50 mg STK-MED ONCE .ROUTE ;  Start 4/27/20 at 

10:56;  Stop 4/27/20 at 10:57;  Status DC


Fentanyl Citrate (Fentanyl 2ml Vial) 100 mcg STK-MED ONCE .ROUTE ;  Start 

4/27/20 at 10:56;  Stop 4/27/20 at 10:57;  Status DC


Bupivacaine HCl/ Epinephrine Bitart (Sensorcain-Epi 0.5%-1:718308 Mpf) 30 ml 

STK-MED ONCE .ROUTE  Last administered on 4/27/20at 12:01;  Start 4/27/20 at 

10:58;  Stop 4/27/20 at 10:58;  Status DC


Cellulose (Surgicel Hemostat 2x14) 1 each STK-MED ONCE .ROUTE ;  Start 4/27/20 

at 10:58;  Stop 4/27/20 at 10:59;  Status DC


Iohexol (Omnipaque 300 Mg/ml) 50 ml STK-MED ONCE .ROUTE ;  Start 4/27/20 at 10:

58;  Stop 4/27/20 at 10:59;  Status DC


Cellulose (Surgicel Hemostat 4x8) 1 each STK-MED ONCE .ROUTE ;  Start 4/27/20 at

10:58;  Stop 4/27/20 at 10:59;  Status DC


Bisacodyl (Dulcolax Supp) 10 mg STK-MED ONCE .ROUTE ;  Start 4/27/20 at 10:59;  

Stop 4/27/20 at 10:59;  Status DC


Heparin Sodium (Porcine) 1000 unit/Sodium Chloride 1,001 ml @  1,001 mls/hr 1X  

ONCE IRR ;  Start 4/27/20 at 12:00;  Stop 4/27/20 at 12:59;  Status DC


Propofol 20 ml @ As Directed STK-MED ONCE IV ;  Start 4/27/20 at 11:05;  Stop 

4/27/20 at 11:05;  Status DC


Sevoflurane (Ultane) 90 ml STK-MED ONCE IH ;  Start 4/27/20 at 11:05;  Stop 

4/27/20 at 11:05;  Status DC


Sevoflurane (Ultane) 60 ml STK-MED ONCE IH ;  Start 4/27/20 at 12:26;  Stop 

4/27/20 at 12:27;  Status DC


Propofol 20 ml @ As Directed STK-MED ONCE IV ;  Start 4/27/20 at 12:26;  Stop 

4/27/20 at 12:27;  Status DC


Phenylephrine HCl (PHENYLEPHRINE in 0.9% NACL PF) 1 mg STK-MED ONCE IV ;  Start 

4/27/20 at 12:34;  Stop 4/27/20 at 12:34;  Status DC


Heparin Sodium (Porcine) (Heparin Sodium) 5,000 unit Q12HR SQ  Last administered

on 5/6/20at 20:57;  Start 4/27/20 at 21:00;  Stop 5/7/20 at 09:59;  Status DC


Sodium Chloride (Normal Saline Flush) 3 ml QSHIFT  PRN IV AFTER MEDS AND BLOOD 

DRAWS;  Start 4/27/20 at 13:45;  Status Cancel


Naloxone HCl (Narcan) 0.4 mg PRN Q2MIN  PRN IV SEE INSTRUCTIONS Last 

administered on 6/6/20at 15:15;  Start 4/27/20 at 13:45;  Stop 7/1/20 at 16:00; 

Status DC


Sodium Chloride 1,000 ml @  25 mls/hr Q24H IV  Last administered on 5/26/20at 

13:37;  Start 4/27/20 at 13:37;  Stop 5/29/20 at 13:09;  Status DC


Naloxone HCl (Narcan) 0.4 mg PRN Q2MIN  PRN IV SEE INSTRUCTIONS;  Start 4/27/20 

at 14:30;  Status UNV


Sodium Chloride 1,000 ml @  25 mls/hr Q24H IV ;  Start 4/27/20 at 14:30;  Status

UNV


Hydromorphone HCl 30 ml @ 0 mls/hr CONT PRN  PRN IV PER PROTOCOL Last 

administered on 5/2/20at 16:08;  Start 4/27/20 at 14:30;  Stop 5/4/20 at 08:55; 

Status DC


Potassium Acetate 55 meq/Magnesium Sulfate 20 meq/ Calcium Gluconate 10 meq/ 

Multivitamins 10 ml/Chromium/ Copper/Manganese/ Seleni/Zn 0.5 ml/ Insulin Human 

Regular 35 unit/ Total Parenteral Nutrition/Amino Acids/Dextrose/ Fat Emulsion 

Intravenous 1,920 ml @  80 mls/hr TPN  CONT IV  Last administered on 4/27/20at 

22:01;  Start 4/27/20 at 22:00;  Stop 4/28/20 at 21:59;  Status DC


Bumetanide (Bumex) 2 mg BID92 IV  Last administered on 5/1/20at 13:50;  Start 

4/28/20 at 14:00;  Stop 5/2/20 at 14:10;  Status DC


Meropenem 1 gm/ Sodium Chloride 100 ml @  200 mls/hr Q8HRS IV  Last administered

on 5/22/20at 05:53;  Start 4/28/20 at 14:00;  Stop 5/22/20 at 09:31;  Status DC


Potassium Acetate 55 meq/Magnesium Sulfate 20 meq/ Calcium Gluconate 10 meq/ 

Multivitamins 10 ml/Chromium/ Copper/Manganese/ Seleni/Zn 0.5 ml/ Insulin Human 

Regular 35 unit/ Total Parenteral Nutrition/Amino Acids/Dextrose/ Fat Emulsion 

Intravenous 1,920 ml @  80 mls/hr TPN  CONT IV  Last administered on 4/28/20at 

22:02;  Start 4/28/20 at 22:00;  Stop 4/29/20 at 21:59;  Status DC


Hydromorphone HCl (Dilaudid Standard PCA) 12 mg STK-MED ONCE IV ;  Start 4/27/20

at 14:35;  Stop 4/28/20 at 13:53;  Status DC


Artificial Tears (Artificial Tears) 1 drop PRN Q15MIN  PRN OU DRY EYE Last 

administered on 6/23/20at 21:17;  Start 4/29/20 at 05:30


Hydromorphone HCl (Dilaudid Standard PCA) 12 mg STK-MED ONCE IV ;  Start 4/28/20

at 12:05;  Stop 4/29/20 at 09:15;  Status DC


Potassium Acetate 65 meq/Magnesium Sulfate 20 meq/ Calcium Gluconate 10 meq/ 

Multivitamins 10 ml/Chromium/ Copper/Manganese/ Seleni/Zn 0.5 ml/ Insulin Human 

Regular 30 unit/ Total Parenteral Nutrition/Amino Acids/Dextrose/ Fat Emulsion 

Intravenous 1,920 ml @  80 mls/hr TPN  CONT IV  Last administered on 4/29/20at 

22:22;  Start 4/29/20 at 22:00;  Stop 4/30/20 at 21:59;  Status DC


Cyclobenzaprine HCl (Flexeril) 10 mg PRN Q6HRS  PRN PO MUSCLE SPASMS;  Start 

4/30/20 at 10:45


Potassium Acetate 55 meq/Magnesium Sulfate 20 meq/ Calcium Gluconate 10 meq/ 

Multivitamins 10 ml/Chromium/ Copper/Manganese/ Seleni/Zn 0.5 ml/ Insulin Human 

Regular 30 unit/ Total Parenteral Nutrition/Amino Acids/Dextrose/ Fat Emulsion 

Intravenous 1,920 ml @  80 mls/hr TPN  CONT IV  Last administered on 5/1/20at 

01:00;  Start 4/30/20 at 22:00;  Stop 5/1/20 at 21:59;  Status DC


Magnesium Sulfate 50 ml @ 25 mls/hr 1X  ONCE IV  Last administered on 4/30/20at 

17:18;  Start 4/30/20 at 12:45;  Stop 4/30/20 at 14:44;  Status DC


Potassium Chloride/Water 100 ml @  100 mls/hr 1X  ONCE IV  Last administered on 

5/1/20at 11:27;  Start 5/1/20 at 12:00;  Stop 5/1/20 at 12:59;  Status DC


Hydromorphone HCl (Dilaudid Standard PCA) 12 mg STK-MED ONCE IV ;  Start 4/29/20

at 10:50;  Stop 5/1/20 at 11:02;  Status DC


Hydromorphone HCl (Dilaudid Standard PCA) 12 mg STK-MED ONCE IV ;  Start 4/30/20

at 13:47;  Stop 5/1/20 at 11:03;  Status DC


Potassium Acetate 30 meq/Magnesium Sulfate 20 meq/ Calcium Gluconate 10 meq/ 

Multivitamins 10 ml/Chromium/ Copper/Manganese/ Seleni/Zn 0.5 ml/ Insulin Human 

Regular 30 unit/ Potassium Chloride 30 meq/ Total Parenteral Nutrition/Amino 

Acids/Dextrose/ Fat Emulsion Intravenous 1,920 ml @  80 mls/hr TPN  CONT IV  

Last administered on 5/1/20at 22:34;  Start 5/1/20 at 22:00;  Stop 5/2/20 at 

21:59;  Status DC


Potassium Chloride/Water 100 ml @  100 mls/hr Q1H IV  Last administered on 

5/2/20at 13:05;  Start 5/2/20 at 07:00;  Stop 5/2/20 at 10:59;  Status DC


Magnesium Sulfate 50 ml @ 25 mls/hr 1X  ONCE IV  Last administered on 5/2/20at 

10:34;  Start 5/2/20 at 10:30;  Stop 5/2/20 at 12:29;  Status DC


Potassium Chloride 75 meq/ Magnesium Sulfate 20 meq/Calcium Gluconate 10 meq/ 

Multivitamins 10 ml/Chromium/ Copper/Manganese/ Seleni/Zn 0.5 ml/ Insulin Human 

Regular 30 unit/ Total Parenteral Nutrition/Amino Acids/Dextrose/ Fat Emulsion 

Intravenous 1,920 ml @  80 mls/hr TPN  CONT IV  Last administered on 5/2/20at 

21:51;  Start 5/2/20 at 22:00;  Stop 5/3/20 at 22:00;  Status DC


Potassium Chloride 75 meq/ Magnesium Sulfate 20 meq/Calcium Gluconate 10 meq/ 

Multivitamins 10 ml/Chromium/ Copper/Manganese/ Seleni/Zn 0.5 ml/ Insulin Human 

Regular 25 unit/ Total Parenteral Nutrition/Amino Acids/Dextrose/ Fat Emulsion 

Intravenous 1,920 ml @  80 mls/hr TPN  CONT IV  Last administered on 5/3/20at 

22:04;  Start 5/3/20 at 22:00;  Stop 5/4/20 at 21:59;  Status DC


Hydromorphone HCl (Dilaudid) 0.4 mg PRN Q4HRS  PRN IVP PAIN Last administered on

5/4/20at 10:57;  Start 5/4/20 at 09:00;  Stop 5/4/20 at 18:59;  Status DC


Micafungin Sodium 100 mg/Dextrose 100 ml @  100 mls/hr Q24H IV  Last 

administered on 5/26/20at 12:17;  Start 5/4/20 at 11:00;  Stop 5/27/20 at 09:59;

 Status DC


Daptomycin 485 mg/ Sodium Chloride 50 ml @  100 mls/hr Q24H IV  Last 

administered on 5/11/20at 13:10;  Start 5/4/20 at 11:00;  Stop 5/12/20 at 07:44;

 Status DC


Potassium Chloride 75 meq/ Magnesium Sulfate 15 meq/Calcium Gluconate 8 meq/ Mul

tivitamins 10 ml/Chromium/ Copper/Manganese/ Seleni/Zn 0.5 ml/ Insulin Human 

Regular 25 unit/ Total Parenteral Nutrition/Amino Acids/Dextrose/ Fat Emulsion 

Intravenous 1,920 ml @  80 mls/hr TPN  CONT IV  Last administered on 5/4/20at 

23:08;  Start 5/4/20 at 22:00;  Stop 5/5/20 at 21:59;  Status DC


Haloperidol Lactate (Haldol Inj) 3 mg 1X  ONCE IVP  Last administered on 

5/4/20at 14:37;  Start 5/4/20 at 14:30;  Stop 5/4/20 at 14:31;  Status DC


Hydromorphone HCl (Dilaudid) 1 mg PRN Q4HRS  PRN IVP PAIN Last administered on 

5/18/20at 06:25;  Start 5/4/20 at 19:00;  Stop 5/18/20 at 17:10;  Status DC


Potassium Chloride 75 meq/ Magnesium Sulfate 15 meq/Calcium Gluconate 8 meq/ 

Multivitamins 10 ml/Chromium/ Copper/Manganese/ Seleni/Zn 0.5 ml/ Insulin Human 

Regular 20 unit/ Total Parenteral Nutrition/Amino Acids/Dextrose/ Fat Emulsion 

Intravenous 1,920 ml @  80 mls/hr TPN  CONT IV  Last administered on 5/5/20at 

22:10;  Start 5/5/20 at 22:00;  Stop 5/6/20 at 21:59;  Status DC


Lidocaine HCl (Buffered Lidocaine 1%) 3 ml STK-MED ONCE .ROUTE ;  Start 5/6/20 

at 11:31;  Stop 5/6/20 at 11:31;  Status DC


Lidocaine HCl (Buffered Lidocaine 1%) 3 ml STK-MED ONCE .ROUTE ;  Start 5/6/20 

at 12:28;  Stop 5/6/20 at 12:29;  Status DC


Lidocaine HCl (Buffered Lidocaine 1%) 6 ml 1X  ONCE INJ  Last administered on 

5/6/20at 12:53;  Start 5/6/20 at 12:45;  Stop 5/6/20 at 12:46;  Status DC


Potassium Chloride 75 meq/ Magnesium Sulfate 15 meq/Calcium Gluconate 8 meq/ 

Multivitamins 10 ml/Chromium/ Copper/Manganese/ Seleni/Zn 0.5 ml/ Insulin Human 

Regular 20 unit/ Total Parenteral Nutrition/Amino Acids/Dextrose/ Fat Emulsion 

Intravenous 1,920 ml @  80 mls/hr TPN  CONT IV  Last administered on 5/6/20at 

22:00;  Start 5/6/20 at 22:00;  Stop 5/7/20 at 21:59;  Status DC


Potassium Chloride 75 meq/ Magnesium Sulfate 15 meq/Calcium Gluconate 8 meq/ 

Multivitamins 10 ml/Chromium/ Copper/Manganese/ Seleni/Zn 0.5 ml/ Insulin Human 

Regular 15 unit/ Total Parenteral Nutrition/Amino Acids/Dextrose/ Fat Emulsion 

Intravenous 1,920 ml @  80 mls/hr TPN  CONT IV  Last administered on 5/7/20at 

22:28;  Start 5/7/20 at 22:00;  Stop 5/8/20 at 21:59;  Status DC


Vecuronium Bromide (Norcuron Bolus) 6 mg PRN Q6HRS  PRN IV VENT ASYNCHRONY;  

Start 5/7/20 at 19:15;  Stop 5/7/20 at 19:35;  Status DC


Bumetanide (Bumex) 2 mg 1X  ONCE IV  Last administered on 5/7/20at 22:09;  Start

5/7/20 at 19:45;  Stop 5/7/20 at 19:46;  Status DC


Lidocaine HCl (Buffered Lidocaine 1%) 3 ml STK-MED ONCE .ROUTE ;  Start 5/8/20 

at 07:59;  Stop 5/8/20 at 07:59;  Status DC


Midazolam HCl (Versed) 5 mg STK-MED ONCE .ROUTE ;  Start 5/8/20 at 08:36;  Stop 

5/8/20 at 08:36;  Status DC


Fentanyl Citrate (Fentanyl 5ml Vial) 250 mcg STK-MED ONCE .ROUTE ;  Start 5/8/20

at 08:36;  Stop 5/8/20 at 08:37;  Status DC


Lidocaine HCl (Buffered Lidocaine 1%) 3 ml 1X  ONCE IJ  Last administered on 

5/8/20at 09:30;  Start 5/8/20 at 09:15;  Stop 5/8/20 at 09:16;  Status DC


Midazolam HCl (Versed) 5 mg 1X  ONCE IV  Last administered on 5/8/20at 09:30;  

Start 5/8/20 at 09:15;  Stop 5/8/20 at 09:16;  Status DC


Fentanyl Citrate (Fentanyl 5ml Vial) 250 mcg 1X  ONCE IV  Last administered on 

5/8/20at 09:30;  Start 5/8/20 at 09:15;  Stop 5/8/20 at 09:16;  Status DC


Bumetanide (Bumex) 2 mg DAILY IV  Last administered on 5/18/20at 08:07;  Start 

5/8/20 at 10:00;  Stop 5/18/20 at 17:15;  Status DC


Potassium Chloride 75 meq/ Magnesium Sulfate 15 meq/ Multivitamins 10 

ml/Chromium/ Copper/Manganese/ Seleni/Zn 0.5 ml/ Insulin Human Regular 15 unit/ 

Total Parenteral Nutrition/Amino Acids/Dextrose/ Fat Emulsion Intravenous 1,920 

ml @  80 mls/hr TPN  CONT IV  Last administered on 5/8/20at 21:59;  Start 5/8/20

at 22:00;  Stop 5/9/20 at 21:59;  Status DC


Metoclopramide HCl (Reglan Vial) 10 mg PRN Q3HRS  PRN IVP NAUSEA/VOMITING-3rd 

choice Last administered on 5/14/20at 04:25;  Start 5/9/20 at 16:45


Potassium Chloride 75 meq/ Magnesium Sulfate 15 meq/ Multivitamins 10 

ml/Chromium/ Copper/Manganese/ Seleni/Zn 0.5 ml/ Insulin Human Regular 15 unit/ 

Total Parenteral Nutrition/Amino Acids/Dextrose/ Fat Emulsion Intravenous 1,920 

ml @  80 mls/hr TPN  CONT IV  Last administered on 5/9/20at 22:41;  Start 5/9/20

at 22:00;  Stop 5/10/20 at 21:59;  Status DC


Magnesium Sulfate 50 ml @ 25 mls/hr 1X  ONCE IV  Last administered on 5/10/20at 

10:44;  Start 5/10/20 at 09:00;  Stop 5/10/20 at 10:59;  Status DC


Potassium Chloride/Water 100 ml @  100 mls/hr 1X  ONCE IV  Last administered on 

5/10/20at 09:37;  Start 5/10/20 at 09:00;  Stop 5/10/20 at 09:59;  Status DC


Duloxetine HCl (Cymbalta) 30 mg DAILY PO  Last administered on 5/11/20at 09:48; 

Start 5/10/20 at 14:00;  Stop 5/13/20 at 10:25;  Status DC


Potassium Chloride 80 meq/ Magnesium Sulfate 20 meq/ Multivitamins 10 

ml/Chromium/ Copper/Manganese/ Seleni/Zn 0.5 ml/ Insulin Human Regular 15 unit/ 

Total Parenteral Nutrition/Amino Acids/Dextrose/ Fat Emulsion Intravenous 1,920 

ml @  80 mls/hr TPN  CONT IV  Last administered on 5/10/20at 21:42;  Start 

5/10/20 at 22:00;  Stop 5/11/20 at 21:59;  Status DC


Potassium Chloride 80 meq/ Magnesium Sulfate 20 meq/ Multivitamins 10 

ml/Chromium/ Copper/Manganese/ Seleni/Zn 0.5 ml/ Insulin Human Regular 15 unit/ 

Total Parenteral Nutrition/Amino Acids/Dextrose/ Fat Emulsion Intravenous 1,920 

ml @  80 mls/hr TPN  CONT IV  Last administered on 5/11/20at 22:20;  Start 

5/11/20 at 22:00;  Stop 5/12/20 at 21:59;  Status DC


Lidocaine HCl (Buffered Lidocaine 1%) 3 ml STK-MED ONCE .ROUTE ;  Start 5/12/20 

at 09:54;  Stop 5/12/20 at 09:55;  Status DC


Hydromorphone HCl (Dilaudid Standard PCA) 12 mg STK-MED ONCE IV ;  Start 5/1/20 

at 15:50;  Stop 5/12/20 at 11:24;  Status DC


Potassium Chloride 80 meq/ Magnesium Sulfate 20 meq/ Multivitamins 10 

ml/Chromium/ Copper/Manganese/ Seleni/Zn 0.5 ml/ Insulin Human Regular 15 unit/ 

Total Parenteral Nutrition/Amino Acids/Dextrose/ Fat Emulsion Intravenous 1,920 

ml @  80 mls/hr TPN  CONT IV  Last administered on 5/12/20at 21:40;  Start 

5/12/20 at 22:00;  Stop 5/13/20 at 21:59;  Status DC


Lidocaine HCl (Buffered Lidocaine 1%) 6 ml 1X  ONCE INJ  Last administered on 

5/12/20at 14:15;  Start 5/12/20 at 14:15;  Stop 5/12/20 at 14:16;  Status DC


Potassium Chloride 80 meq/ Magnesium Sulfate 20 meq/ Multivitamins 10 

ml/Chromium/ Copper/Manganese/ Seleni/Zn 1 ml/ Insulin Human Regular 15 unit/ 

Total Parenteral Nutrition/Amino Acids/Dextrose/ Fat Emulsion Intravenous 1,920 

ml @  80 mls/hr TPN  CONT IV  Last administered on 5/13/20at 22:04;  Start 

5/13/20 at 22:00;  Stop 5/14/20 at 21:59;  Status DC


Potassium Chloride/Water 100 ml @  100 mls/hr 1X  ONCE IV  Last administered on 

5/14/20at 11:34;  Start 5/14/20 at 11:00;  Stop 5/14/20 at 11:59;  Status DC


Potassium Chloride 90 meq/ Magnesium Sulfate 20 meq/ Multivitamins 10 

ml/Chromium/ Copper/Manganese/ Seleni/Zn 1 ml/ Insulin Human Regular 15 unit/ 

Total Parenteral Nutrition/Amino Acids/Dextrose/ Fat Emulsion Intravenous 1,920 

ml @  80 mls/hr TPN  CONT IV  Last administered on 5/14/20at 22:57;  Start 

5/14/20 at 22:00;  Stop 5/15/20 at 21:59;  Status DC


Potassium Chloride 90 meq/ Magnesium Sulfate 20 meq/ Multivitamins 10 

ml/Chromium/ Copper/Manganese/ Seleni/Zn 1 ml/ Insulin Human Regular 15 unit/ 

Total Parenteral Nutrition/Amino Acids/Dextrose/ Fat Emulsion Intravenous 1,920 

ml @  80 mls/hr TPN  CONT IV  Last administered on 5/15/20at 22:48;  Start 

5/15/20 at 22:00;  Stop 5/16/20 at 21:59;  Status DC


Potassium Chloride 90 meq/ Magnesium Sulfate 20 meq/ Multivitamins 10 

ml/Chromium/ Copper/Manganese/ Seleni/Zn 1 ml/ Insulin Human Regular 15 unit/ 

Total Parenteral Nutrition/Amino Acids/Dextrose/ Fat Emulsion Intravenous 1,890 

ml @  78.75 mls/ hr TPN  CONT IV  Last administered on 5/16/20at 22:15;  Start 

5/16/20 at 22:00;  Stop 5/17/20 at 21:59;  Status DC


Linezolid/Dextrose 300 ml @  300 mls/hr Q12HR IV  Last administered on 5/19/20at

21:08;  Start 5/17/20 at 09:00;  Stop 5/20/20 at 08:11;  Status DC


Daptomycin 450 mg/ Sodium Chloride 50 ml @  100 mls/hr Q24H IV  Last 

administered on 5/20/20at 09:25;  Start 5/17/20 at 09:00;  Stop 5/21/20 at 

08:30;  Status DC


Potassium Chloride 90 meq/ Magnesium Sulfate 20 meq/ Multivitamins 10 

ml/Chromium/ Copper/Manganese/ Seleni/Zn 1 ml/ Insulin Human Regular 15 unit/ 

Total Parenteral Nutrition/Amino Acids/Dextrose/ Fat Emulsion Intravenous 1,890 

ml @  78.75 mls/ hr TPN  CONT IV  Last administered on 5/17/20at 21:34;  Start 

5/17/20 at 22:00;  Stop 5/18/20 at 21:59;  Status DC


Lorazepam (Ativan Inj) 2 mg STK-MED ONCE .ROUTE ;  Start 5/17/20 at 14:58;  Stop

5/17/20 at 14:58;  Status DC


Metoprolol Tartrate (Lopressor Vial) 5 mg 1X  ONCE IVP  Last administered on 

5/17/20at 15:31;  Start 5/17/20 at 15:15;  Stop 5/17/20 at 15:16;  Status DC


Lorazepam (Ativan Inj) 2 mg 1X  ONCE IVP  Last administered on 5/17/20at 15:30; 

Start 5/17/20 at 15:15;  Stop 5/17/20 at 15:16;  Status DC


Enoxaparin Sodium (Lovenox 40mg Syringe) 40 mg Q24H SQ  Last administered on 

6/5/20at 17:44;  Start 5/17/20 at 17:00;  Stop 6/7/20 at 06:50;  Status DC


Lorazepam (Ativan Inj) 1 mg PRN Q4HRS  PRN IVP ANXIETY / AGITATION MILD-MOD Last

administered on 5/31/20at 15:55;  Start 5/17/20 at 19:15;  Stop 6/2/20 at 11:45;

 Status DC


Lorazepam (Ativan Inj) 2 mg PRN Q4HRS  PRN IVP ANXIETY / AGITATION SEVERE Last 

administered on 6/1/20at 07:55;  Start 5/17/20 at 19:15;  Stop 6/2/20 at 11:45; 

Status DC


Fentanyl Citrate (Fentanyl 2ml Vial) 50 mcg PRN Q4HRS  PRN IVP SEVERE PAIN Last 

administered on 6/13/20at 05:15;  Start 5/18/20 at 13:15;  Stop 6/14/20 at 

09:29;  Status DC


Fentanyl Citrate (Fentanyl 2ml Vial) 25 mcg PRN Q4HRS  PRN IVP MODERATE PAIN 

Last administered on 6/13/20at 00:27;  Start 5/18/20 at 13:15;  Stop 6/14/20 at 

09:30;  Status DC


Potassium Chloride 90 meq/ Magnesium Sulfate 20 meq/ Multivitamins 10 

ml/Chromium/ Copper/Manganese/ Seleni/Zn 1 ml/ Insulin Human Regular 15 unit/ 

Total Parenteral Nutrition/Amino Acids/Dextrose/ Fat Emulsion Intravenous 1,890 

ml @  78.75 mls/ hr TPN  CONT IV  Last administered on 5/18/20at 22:18;  Start 

5/18/20 at 22:00;  Stop 5/19/20 at 21:59;  Status DC


Furosemide (Lasix) 40 mg 1X  ONCE IVP  Last administered on 5/18/20at 21:51;  

Start 5/18/20 at 21:45;  Stop 5/18/20 at 21:48;  Status DC


Albumin Human 100 ml @  100 mls/hr 1X PRN  PRN IV SEE COMMENTS;  Start 5/19/20 

at 01:30


Furosemide (Lasix) 40 mg [id number] IVP  Last administered on 6/3/20at 08:04;  Start 

5/19/20 at 14:00;  Stop 6/3/20 at 13:07;  Status DC


Potassium Chloride 90 meq/ Magnesium Sulfate 20 meq/ Multivitamins 10 

ml/Chromium/ Copper/Manganese/ Seleni/Zn 1 ml/ Insulin Human Regular 15 unit/ 

Total Parenteral Nutrition/Amino Acids/Dextrose/ Fat Emulsion Intravenous 1,800 

ml @  75 mls/hr TPN  CONT IV  Last administered on 5/19/20at 22:31;  Start 

5/19/20 at 22:00;  Stop 5/20/20 at 21:59;  Status DC


Potassium Chloride 90 meq/ Magnesium Sulfate 20 meq/ Multivitamins 10 

ml/Chromium/ Copper/Manganese/ Seleni/Zn 1 ml/ Insulin Human Regular 15 unit/ 

Total Parenteral Nutrition/Amino Acids/Dextrose/ Fat Emulsion Intravenous 1,800 

ml @  75 mls/hr TPN  CONT IV  Last administered on 5/20/20at 22:28;  Start 

5/20/20 at 22:00;  Stop 5/21/20 at 21:59;  Status DC


Potassium Chloride 110 meq/ Magnesium Sulfate 20 meq/ Multivitamins 10 

ml/Chromium/ Copper/Manganese/ Seleni/Zn 1 ml/ Insulin Human Regular 15 unit/ 

Total Parenteral Nutrition/Amino Acids/Dextrose/ Fat Emulsion Intravenous 1,800 

ml @  75 mls/hr TPN  CONT IV  Last administered on 5/21/20at 22:01;  Start 

5/21/20 at 22:00;  Stop 5/22/20 at 21:59;  Status DC


Saliva Substitute (Biotene Moisturizing Mouth) 2 spray PRN Q15MIN  PRN PO DRY 

MOUTH;  Start 5/21/20 at 11:00


Potassium Chloride 110 meq/ Magnesium Sulfate 20 meq/ Multivitamins 10 

ml/Chromium/ Copper/Manganese/ Seleni/Zn 1 ml/ Insulin Human Regular 15 unit/ 

Total Parenteral Nutrition/Amino Acids/Dextrose/ Fat Emulsion Intravenous 1,800 

ml @  75 mls/hr TPN  CONT IV  Last administered on 5/22/20at 22:21;  Start 

5/22/20 at 22:00;  Stop 5/23/20 at 21:59;  Status DC


Potassium Chloride 110 meq/ Magnesium Sulfate 20 meq/ Multivitamins 10 

ml/Chromium/ Copper/Manganese/ Seleni/Zn 1 ml/ Insulin Human Regular 15 unit/ 

Total Parenteral Nutrition/Amino Acids/Dextrose/ Fat Emulsion Intravenous 1,800 

ml @  75 mls/hr TPN  CONT IV  Last administered on 5/23/20at 22:04;  Start 

5/23/20 at 22:00;  Stop 5/24/20 at 21:59;  Status DC


Potassium Chloride 110 meq/ Magnesium Sulfate 20 meq/ Multivitamins 10 

ml/Chromium/ Copper/Manganese/ Seleni/Zn 1 ml/ Insulin Human Regular 15 unit/ 

Total Parenteral Nutrition/Amino Acids/Dextrose/ Fat Emulsion Intravenous 1,800 

ml @  75 mls/hr TPN  CONT IV  Last administered on 5/24/20at 22:48;  Start 

5/24/20 at 22:00;  Stop 5/25/20 at 21:59;  Status DC


Potassium Chloride 70 meq/ Magnesium Sulfate 20 meq/ Multivitamins 10 

ml/Chromium/ Copper/Manganese/ Seleni/Zn 1 ml/ Insulin Human Regular 15 unit/ 

Total Parenteral Nutrition/Amino Acids/Dextrose/ Fat Emulsion Intravenous 1,800 

ml @  75 mls/hr TPN  CONT IV  Last administered on 5/25/20at 21:39;  Start 

5/25/20 at 22:00;  Stop 5/26/20 at 21:59;  Status DC


Meropenem 500 mg/ Sodium Chloride 50 ml @  100 mls/hr Q6HRS IV  Last 

administered on 5/27/20at 06:02;  Start 5/25/20 at 18:00;  Stop 5/27/20 at 

09:59;  Status DC


Barium Sulfate (Varibar Thin Liquid Apple) 148 gm 1X  ONCE PO ;  Start 5/26/20 

at 11:45;  Stop 5/26/20 at 11:49;  Status DC


Potassium Chloride 70 meq/ Magnesium Sulfate 20 meq/ Multivitamins 10 

ml/Chromium/ Copper/Manganese/ Seleni/Zn 1 ml/ Insulin Human Regular 15 unit/ 

Total Parenteral Nutrition/Amino Acids/Dextrose/ Fat Emulsion Intravenous 1,800 

ml @  75 mls/hr TPN  CONT IV  Last administered on 5/26/20at 22:27;  Start 

5/26/20 at 22:00;  Stop 5/27/20 at 21:59;  Status DC


Piperacillin Sod/ Tazobactam Sod 3.375 gm/Sodium Chloride 50 ml @  100 mls/hr 

Q6HRS IV  Last administered on 6/4/20at 06:10;  Start 5/27/20 at 12:00;  Stop 

6/4/20 at 07:26;  Status DC


Potassium Chloride 70 meq/ Magnesium Sulfate 20 meq/ Multivitamins 10 

ml/Chromium/ Copper/Manganese/ Seleni/Zn 1 ml/ Insulin Human Regular 15 unit/ 

Total Parenteral Nutrition/Amino Acids/Dextrose/ Fat Emulsion Intravenous 1,800 

ml @  75 mls/hr TPN  CONT IV  Last administered on 5/27/20at 22:03;  Start 

5/27/20 at 22:00;  Stop 5/28/20 at 21:59;  Status DC


Potassium Chloride 70 meq/ Magnesium Sulfate 20 meq/ Multivitamins 10 

ml/Chromium/ Copper/Manganese/ Seleni/Zn 1 ml/ Insulin Human Regular 15 unit/ 

Total Parenteral Nutrition/Amino Acids/Dextrose/ Fat Emulsion Intravenous 1,800 

ml @  75 mls/hr TPN  CONT IV  Last administered on 5/28/20at 22:33;  Start 

5/28/20 at 22:00;  Stop 5/29/20 at 21:59;  Status DC


Potassium Chloride 70 meq/ Magnesium Sulfate 20 meq/ Multivitamins 10 

ml/Chromium/ Copper/Manganese/ Seleni/Zn 1 ml/ Insulin Human Regular 15 unit/ 

Total Parenteral Nutrition/Amino Acids/Dextrose/ Fat Emulsion Intravenous 1,800 

ml @  75 mls/hr TPN  CONT IV  Last administered on 5/29/20at 23:13;  Start 

5/29/20 at 22:00;  Stop 5/30/20 at 21:59;  Status DC


Potassium Chloride 80 meq/ Magnesium Sulfate 20 meq/ Multivitamins 10 

ml/Chromium/ Copper/Manganese/ Seleni/Zn 1 ml/ Insulin Human Regular 15 unit/ T

otal Parenteral Nutrition/Amino Acids/Dextrose/ Fat Emulsion Intravenous 1,800 

ml @  75 mls/hr TPN  CONT IV  Last administered on 5/30/20at 22:30;  Start 

5/30/20 at 22:00;  Stop 5/31/20 at 21:59;  Status DC


Potassium Chloride 80 meq/ Magnesium Sulfate 20 meq/ Multivitamins 10 

ml/Chromium/ Copper/Manganese/ Seleni/Zn 1 ml/ Insulin Human Regular 15 unit/ 

Total Parenteral Nutrition/Amino Acids/Dextrose/ Fat Emulsion Intravenous 1,800 

ml @  75 mls/hr TPN  CONT IV  Last administered on 5/31/20at 21:54;  Start 5/ 31/20 at 22:00;  Stop 6/1/20 at 21:59;  Status DC


Potassium Chloride/Water 100 ml @  100 mls/hr 1X  ONCE IV  Last administered on 

6/1/20at 10:15;  Start 6/1/20 at 10:00;  Stop 6/1/20 at 10:59;  Status DC


Potassium Chloride 90 meq/ Magnesium Sulfate 20 meq/ Multivitamins 10 

ml/Chromium/ Copper/Manganese/ Seleni/Zn 1 ml/ Insulin Human Regular 20 unit/ 

Total Parenteral Nutrition/Amino Acids/Dextrose/ Fat Emulsion Intravenous 1,800 

ml @  75 mls/hr TPN  CONT IV  Last administered on 6/1/20at 22:28;  Start 6/1/20

at 22:00;  Stop 6/2/20 at 21:59;  Status DC


Potassium Chloride 90 meq/ Magnesium Sulfate 20 meq/ Multivitamins 10 

ml/Chromium/ Copper/Manganese/ Seleni/Zn 1 ml/ Insulin Human Regular 20 unit/ 

Total Parenteral Nutrition/Amino Acids/Dextrose/ Fat Emulsion Intravenous 1,800 

ml @  75 mls/hr TPN  CONT IV  Last administered on 6/2/20at 22:08;  Start 6/2/20

at 22:00;  Stop 6/3/20 at 21:59;  Status DC


Lorazepam (Ativan Inj) 0.25 mg PRN Q4HRS  PRN IVP ANXIETY / AGITATION Last 

administered on 6/27/20at 13:37;  Start 6/3/20 at 07:30


Potassium Chloride 90 meq/ Magnesium Sulfate 20 meq/ Multivitamins 10 

ml/Chromium/ Copper/Manganese/ Seleni/Zn 1 ml/ Insulin Human Regular 20 unit/ 

Total Parenteral Nutrition/Amino Acids/Dextrose/ Fat Emulsion Intravenous 1,800 

ml @  75 mls/hr TPN  CONT IV  Last administered on 6/3/20at 23:13;  Start 6/3/20

at 22:00;  Stop 6/4/20 at 21:59;  Status DC


Furosemide (Lasix) 40 mg DAILY IVP  Last administered on 6/5/20at 11:14;  Start 

6/3/20 at 13:30;  Stop 6/7/20 at 09:12;  Status DC


Fluoxetine HCl (PROzac) 20 mg QHS PEG  Last administered on 7/1/20at 23:04;  

Start 6/4/20 at 21:00


Fentanyl (Duragesic 50mcg/ Hr Patch) 1 patch Q72H TD  Last administered on 

6/4/20at 21:22;  Start 6/4/20 at 21:00;  Stop 6/13/20 at 12:00;  Status DC


Potassium Chloride 40 meq/ Potassium Acetate 60 meq/Magnesium Sulfate 10 meq/ 

Multivitamins 10 ml/Chromium/ Copper/Manganese/ Seleni/Zn 1 ml/ Insulin Human 

Regular 20 unit/ Total Parenteral Nutrition/Amino Acids/Dextrose/ Fat Emulsion 

Intravenous 1,800 ml @  75 mls/hr TPN  CONT IV  Last administered on 6/5/20at 

00:03;  Start 6/4/20 at 22:00;  Stop 6/5/20 at 21:59;  Status DC


Potassium Acetate 80 meq/Magnesium Sulfate 5 meq/ Multivitamins 10 ml/Chromium/ 

Copper/Manganese/ Seleni/Zn 1 ml/ Insulin Human Regular 20 unit/ Total P

arenteral Nutrition/Amino Acids/Dextrose/ Fat Emulsion Intravenous 1,920 ml @  

80 mls/hr TPN  CONT IV  Last administered on 6/5/20at 21:59;  Start 6/5/20 at 

22:00;  Stop 6/6/20 at 21:59;  Status DC


Potassium Acetate 60 meq/Magnesium Sulfate 5 meq/ Multivitamins 10 ml/Chromium/ 

Copper/Manganese/ Seleni/Zn 1 ml/ Insulin Human Regular 30 unit/ Total 

Parenteral Nutrition/Amino Acids/Dextrose/ Fat Emulsion Intravenous 1,920 ml @  

80 mls/hr TPN  CONT IV  Last administered on 6/6/20at 21:54;  Start 6/6/20 at 

22:00;  Stop 6/7/20 at 21:59;  Status DC


Norepinephrine Bitartrate 8 mg/ Dextrose 258 ml @  13.332 mls/ hr CONT  PRN IV 

PER PROTOCOL Last administered on 7/2/20at 09:09;  Start 6/7/20 at 06:30


Albumin Human 500 ml @  125 mls/hr 1X  ONCE IV  Last administered on 6/7/20at 

08:10;  Start 6/7/20 at 08:15;  Stop 6/7/20 at 12:14;  Status DC


Potassium Acetate 40 meq/Magnesium Sulfate 5 meq/ Multivitamins 10 ml/Chromium/ 

Copper/Manganese/ Seleni/Zn 1 ml/ Insulin Human Regular 30 unit/ Total 

Parenteral Nutrition/Amino Acids/Dextrose/ Fat Emulsion Intravenous 1,920 ml @  

80 mls/hr TPN  CONT IV  Last administered on 6/7/20at 22:23;  Start 6/7/20 at 

22:00;  Stop 6/8/20 at 21:59;  Status DC


Meropenem 1 gm/ Sodium Chloride 100 ml @  200 mls/hr Q8HRS IV ;  Start 6/7/20 at

14:00;  Status Cancel


Meropenem 1 gm/ Sodium Chloride 100 ml @  200 mls/hr Q8HRS IV  Last administered

on 6/7/20at 11:04;  Start 6/7/20 at 10:00;  Stop 6/7/20 at 13:00;  Status DC


Meropenem 1 gm/ Sodium Chloride 100 ml @  200 mls/hr Q12HR IV  Last administered

on 6/25/20at 08:27;  Start 6/7/20 at 21:00;  Stop 6/25/20 at 08:56;  Status DC


Sodium Chloride 1,000 ml @  1,000 mls/hr 1X  ONCE IV  Last administered on 

6/7/20at 11:06;  Start 6/7/20 at 10:45;  Stop 6/7/20 at 11:44;  Status DC


Micafungin Sodium 100 mg/Dextrose 100 ml @  100 mls/hr Q24H IV  Last 

administered on 6/24/20at 12:34;  Start 6/7/20 at 11:00;  Stop 6/25/20 at 08:56;

 Status DC


Daptomycin 410 mg/ Sodium Chloride 50 ml @  100 mls/hr Q24H IV  Last 

administered on 6/9/20at 13:33;  Start 6/7/20 at 14:00;  Stop 6/10/20 at 08:30; 

Status DC


Midazolam HCl (Versed) 2 mg STK-MED ONCE .ROUTE ;  Start 6/7/20 at 14:47;  Stop 

6/7/20 at 14:48;  Status DC


Fentanyl Citrate (Fentanyl 2ml Vial) 100 mcg STK-MED ONCE .ROUTE ;  Start 6/7/20

at 14:47;  Stop 6/7/20 at 14:48;  Status DC


Flumazenil (Romazicon) 0.5 mg STK-MED ONCE IV ;  Start 6/7/20 at 14:48;  Stop 

6/7/20 at 14:48;  Status DC


Naloxone HCl (Narcan) 0.4 mg STK-MED ONCE .ROUTE ;  Start 6/7/20 at 14:48;  Stop

6/7/20 at 14:48;  Status DC


Lidocaine HCl (Lidocaine 1% 20ml Vial) 20 ml STK-MED ONCE .ROUTE ;  Start 6/7/20

at 14:48;  Stop 6/7/20 at 14:48;  Status DC


Midazolam HCl (Versed) 2 mg 1X  ONCE IV  Last administered on 6/7/20at 15:28;  

Start 6/7/20 at 15:00;  Stop 6/7/20 at 15:01;  Status DC


Fentanyl Citrate (Fentanyl 2ml Vial) 100 mcg 1X  ONCE IV  Last administered on 

6/7/20at 15:28;  Start 6/7/20 at 15:00;  Stop 6/7/20 at 15:01;  Status DC


Lidocaine HCl (Lidocaine 1% 20ml Vial) 20 ml 1X  ONCE INJ  Last administered on 

6/7/20at 15:30;  Start 6/7/20 at 15:00;  Stop 6/7/20 at 15:01;  Status DC


Sodium Chloride 1,000 ml @  100 mls/hr Q10H IV  Last administered on 6/16/20at 

07:30;  Start 6/7/20 at 20:00;  Stop 6/16/20 at 11:26;  Status DC


Sodium Bicarbonate (Sodium Bicarb Adult 8.4% Syr) 50 meq 1X  ONCE IV  Last 

administered on 6/7/20at 21:47;  Start 6/7/20 at 22:00;  Stop 6/7/20 at 22:01;  

Status DC


Potassium Acetate 40 meq/Magnesium Sulfate 5 meq/ Multivitamins 10 ml/Chromium/ 

Copper/Manganese/ Seleni/Zn 1 ml/ Insulin Human Regular 30 unit/ Total 

Parenteral Nutrition/Amino Acids/Dextrose/ Fat Emulsion Intravenous 1,920 ml @  

80 mls/hr TPN  CONT IV  Last administered on 6/8/20at 22:28;  Start 6/8/20 at 

22:00;  Stop 6/9/20 at 21:59;  Status DC


Sodium Chloride 500 ml @  500 mls/hr 1X  ONCE IV  Last administered on 6/9/20at 

06:39;  Start 6/9/20 at 06:45;  Stop 6/9/20 at 07:44;  Status DC


Potassium Acetate 40 meq/Magnesium Sulfate 5 meq/ Multivitamins 10 ml/Chromium/ 

Copper/Manganese/ Seleni/Zn 1 ml/ Insulin Human Regular 30 unit/ Total 

Parenteral Nutrition/Amino Acids/Dextrose/ Fat Emulsion Intravenous 1,920 ml @  

80 mls/hr TPN  CONT IV  Last administered on 6/9/20at 22:03;  Start 6/9/20 at 

22:00;  Stop 6/10/20 at 21:59;  Status DC


Metoprolol Tartrate (Lopressor Vial) 5 mg PRN Q6HRS  PRN IVP HYPERTENSION Last 

administered on 6/18/20at 10:14;  Start 6/10/20 at 09:00


Potassium Acetate 40 meq/Magnesium Sulfate 5 meq/ Multivitamins 10 ml/Chromium/ 

Copper/Manganese/ Seleni/Zn 1 ml/ Insulin Human Regular 30 unit/ Total 

Parenteral Nutrition/Amino Acids/Dextrose/ Fat Emulsion Intravenous 1,920 ml @  

80 mls/hr TPN  CONT IV  Last administered on 6/10/20at 21:26;  Start 6/10/20 at 

22:00;  Stop 6/11/20 at 21:59;  Status DC


Potassium Acetate 40 meq/Magnesium Sulfate 5 meq/ Multivitamins 10 ml/Chromium/ 

Copper/Manganese/ Seleni/Zn 1 ml/ Insulin Human Regular 30 unit/ Total 

Parenteral Nutrition/Amino Acids/Dextrose/ Fat Emulsion Intravenous 1,920 ml @  

80 mls/hr TPN  CONT IV  Last administered on 6/11/20at 23:23;  Start 6/11/20 at 

22:00;  Stop 6/12/20 at 21:59;  Status DC


Potassium Acetate 40 meq/Magnesium Sulfate 5 meq/ Multivitamins 10 ml/Chromium/ 

Copper/Manganese/ Seleni/Zn 1 ml/ Insulin Human Regular 30 unit/ Total 

Parenteral Nutrition/Amino Acids/Dextrose/ Fat Emulsion Intravenous 1,920 ml @  

80 mls/hr TPN  CONT IV  Last administered on 6/12/20at 21:35;  Start 6/12/20 at 

22:00;  Stop 6/13/20 at 21:59;  Status DC


Furosemide (Lasix) 20 mg 1X  ONCE IVP  Last administered on 6/13/20at 06:26;  

Start 6/13/20 at 06:15;  Stop 6/13/20 at 06:16;  Status DC


Methylprednisolone Sodium Succinate (SOLU-Medrol 125MG VIAL) 125 mg 1X  ONCE IV 

Last administered on 6/13/20at 06:26;  Start 6/13/20 at 06:15;  Stop 6/13/20 at 

06:16;  Status DC


Albuterol/ Ipratropium (Duoneb) 3 ml Q4HRS NEB  Last administered on 7/2/20at 

11:35;  Start 6/13/20 at 08:00


Fentanyl Citrate 30 ml @ 0 mls/hr CONT  PRN IV SEE PROTOCOL Last administered on

7/2/20at 12:32;  Start 6/13/20 at 06:00


Propofol 100 ml @ 0 mls/hr CONT  PRN IV SEE PROTOCOL Last administered on 

6/20/20at 23:50;  Start 6/13/20 at 06:00


Fentanyl Citrate (Fentanyl 2ml Vial) 25 mcg PRN Q1HR  PRN IV SEE COMMENTS;  

Start 6/13/20 at 06:00


Fentanyl Citrate (Fentanyl 2ml Vial) 50 mcg PRN Q1HR  PRN IV SEE COMMENTS;  

Start 6/13/20 at 06:00


Chlorhexidine Gluconate (Peridex) 15 ml BID MM ;  Start 6/13/20 at 09:00;  Stop 

6/13/20 at 07:58;  Status DC


Potassium Acetate 40 meq/Magnesium Sulfate 5 meq/ Multivitamins 10 ml/Chromium/ 

Copper/Manganese/ Seleni/Zn 1 ml/ Insulin Human Regular 30 unit/ Total 

Parenteral Nutrition/Amino Acids/Dextrose/ Fat Emulsion Intravenous 1,920 ml @  

80 mls/hr TPN  CONT IV  Last administered on 6/13/20at 21:19;  Start 6/13/20 at 

22:00;  Stop 6/14/20 at 21:59;  Status DC


Acetylcysteine (Mucomyst 20% Resp Treatment) 600 mg BID NEB  Last administered 

on 6/19/20at 09:33;  Start 6/13/20 at 21:00;  Stop 6/19/20 at 10:39;  Status DC


Magnesium Sulfate 100 ml @  25 mls/hr 1X  ONCE IV  Last administered on 

6/13/20at 15:48;  Start 6/13/20 at 15:45;  Stop 6/13/20 at 19:44;  Status DC


Potassium Acetate 40 meq/Magnesium Sulfate 5 meq/ Multivitamins 10 ml/Chromium/ 

Copper/Manganese/ Seleni/Zn 1 ml/ Insulin Human Regular 30 unit/ Total 

Parenteral Nutrition/Amino Acids/Dextrose/ Fat Emulsion Intravenous 1,920 ml @  

80 mls/hr TPN  CONT IV  Last administered on 6/14/20at 21:35;  Start 6/14/20 at 

22:00;  Stop 6/15/20 at 21:59;  Status DC


Potassium Chloride/Water 100 ml @  100 mls/hr Q1H IV  Last administered on 

6/15/20at 08:31;  Start 6/15/20 at 07:00;  Stop 6/15/20 at 08:59;  Status DC


Potassium Acetate 40 meq/Magnesium Sulfate 5 meq/ Multivitamins 10 ml/Chromium/ 

Copper/Manganese/ Seleni/Zn 1 ml/ Insulin Human Regular 30 unit/ Total 

Parenteral Nutrition/Amino Acids/Dextrose/ Fat Emulsion Intravenous 1,920 ml @  

80 mls/hr TPN  CONT IV  Last administered on 6/15/20at 21:54;  Start 6/15/20 at 

22:00;  Stop 6/16/20 at 19:34;  Status DC


Lidocaine HCl (Buffered Lidocaine 1%) 3 ml STK-MED ONCE .ROUTE ;  Start 6/15/20 

at 12:14;  Stop 6/15/20 at 12:14;  Status DC


Lidocaine HCl (Buffered Lidocaine 1%) 3 ml 1X  ONCE IJ  Last administered on 

6/15/20at 13:11;  Start 6/15/20 at 13:00;  Stop 6/15/20 at 13:01;  Status DC


Magnesium Sulfate 50 ml @ 25 mls/hr 1X  ONCE IV ;  Start 6/16/20 at 08:15;  Stop

6/16/20 at 10:14;  Status DC


Potassium Acetate 40 meq/Magnesium Sulfate 10 meq/ Multivitamins 10 ml/Chromium/

Copper/Manganese/ Seleni/Zn 1 ml/ Insulin Human Regular 20 unit/ Total 

Parenteral Nutrition/Amino Acids/Dextrose/ Fat Emulsion Intravenous 1,920 ml @  

80 mls/hr TPN  CONT IV  Last administered on 6/16/20at 21:32;  Start 6/16/20 at 

22:00;  Stop 6/17/20 at 21:59;  Status DC


Potassium Chloride/Water 100 ml @  100 mls/hr Q1H IV  Last administered on 

6/17/20at 09:12;  Start 6/17/20 at 08:00;  Stop 6/17/20 at 09:59;  Status DC


Alteplase, Recombinant (Cathflo For Central Catheter Clearance) 4 mg 1X  ONCE 

INT CAT ;  Start 6/17/20 at 09:15;  Stop 6/17/20 at 09:16;  Status UNV


Alteplase, Recombinant (Cathflo For Central Catheter Clearance) 4 mg 1X  ONCE 

INT CAT ;  Start 6/17/20 at 09:15;  Stop 6/17/20 at 09:16;  Status UNV


Alteplase, Recombinant (Cathflo For Central Catheter Clearance) 4 mg 1X  ONCE 

INT CAT ;  Start 6/17/20 at 09:15;  Stop 6/17/20 at 09:16;  Status UNV


Alteplase, Recombinant 4 mg/ Sodium Chloride 20 ml @ 20 mls/hr 1X  ONCE IV  Last

administered on 6/17/20at 10:10;  Start 6/17/20 at 10:00;  Stop 6/17/20 at 

10:59;  Status DC


Alteplase, Recombinant 4 mg/ Sodium Chloride 20 ml @ 20 mls/hr 1X  ONCE IV  Last

administered on 6/17/20at 10:09;  Start 6/17/20 at 10:00;  Stop 6/17/20 at 

10:59;  Status DC


Alteplase, Recombinant 4 mg/ Sodium Chloride 20 ml @ 20 mls/hr 1X  ONCE IV  Last

administered on 6/17/20at 10:09;  Start 6/17/20 at 10:00;  Stop 6/17/20 at 

10:59;  Status DC


Potassium Acetate 60 meq/Magnesium Sulfate 10 meq/ Multivitamins 10 ml/Chromium/

Copper/Manganese/ Seleni/Zn 1 ml/ Insulin Human Regular 20 unit/ Total 

Parenteral Nutrition/Amino Acids/Dextrose/ Fat Emulsion Intravenous 1,920 ml @  

80 mls/hr TPN  CONT IV  Last administered on 6/17/20at 21:55;  Start 6/17/20 at 

22:00;  Stop 6/18/20 at 21:59;  Status DC


Albumin Human 500 ml @  125 mls/hr 1X  ONCE IV  Last administered on 6/18/20at 

12:01;  Start 6/18/20 at 11:15;  Stop 6/18/20 at 15:14;  Status DC


Sodium Chloride 500 ml @  500 mls/hr 1X  ONCE IV  Last administered on 6/18/20at

13:50;  Start 6/18/20 at 11:15;  Stop 6/18/20 at 12:14;  Status DC


Potassium Acetate 60 meq/Magnesium Sulfate 14 meq/ Multivitamins 10 ml/Chromium/

Copper/Manganese/ Seleni/Zn 1 ml/ Insulin Human Regular 20 unit/ Total 

Parenteral Nutrition/Amino Acids/Dextrose/ Fat Emulsion Intravenous 1,920 ml @  

80 mls/hr TPN  CONT IV  Last administered on 6/18/20at 22:26;  Start 6/18/20 at 

22:00;  Stop 6/19/20 at 21:59;  Status DC


Ciprofloxacin/ Dextrose 200 ml @  200 mls/hr Q12HR IV  Last administered on 

6/25/20at 08:27;  Start 6/18/20 at 21:00;  Stop 6/25/20 at 08:56;  Status DC


Albumin Human 250 ml @  62.5 mls/hr 1X  ONCE IV  Last administered on 6/19/20at 

11:09;  Start 6/19/20 at 11:00;  Stop 6/19/20 at 14:59;  Status DC


Furosemide (Lasix) 20 mg 1X  ONCE IVP  Last administered on 6/19/20at 14:52;  

Start 6/19/20 at 10:45;  Stop 6/19/20 at 10:49;  Status DC


Potassium Acetate 60 meq/Magnesium Sulfate 14 meq/ Multivitamins 10 ml/Chromium/

Copper/Manganese/ Seleni/Zn 1 ml/ Insulin Human Regular 15 unit/ Total 

Parenteral Nutrition/Amino Acids/Dextrose/ Fat Emulsion Intravenous 1,920 ml @  

80 mls/hr TPN  CONT IV  Last administered on 6/19/20at 22:08;  Start 6/19/20 at 

22:00;  Stop 6/20/20 at 21:59;  Status DC


Potassium Acetate 60 meq/Magnesium Sulfate 14 meq/ Multivitamins 10 ml/Chromium/

Copper/Manganese/ Seleni/Zn 1 ml/ Insulin Human Regular 15 unit/ Total 

Parenteral Nutrition/Amino Acids/Dextrose/ Fat Emulsion Intravenous 1,920 ml @  

80 mls/hr TPN  CONT IV  Last administered on 6/20/20at 22:12;  Start 6/20/20 at 

22:00;  Stop 6/21/20 at 21:59;  Status DC


Potassium Acetate 60 meq/Magnesium Sulfate 14 meq/ Multivitamins 10 ml/Chromium/

Copper/Manganese/ Seleni/Zn 1 ml/ Insulin Human Regular 15 unit/ Total 

Parenteral Nutrition/Amino Acids/Dextrose/ Fat Emulsion Intravenous 1,920 ml @  

80 mls/hr TPN  CONT IV  Last administered on 6/21/20at 22:22;  Start 6/21/20 at 

22:00;  Stop 6/22/20 at 21:59;  Status DC


Furosemide (Lasix) 20 mg 1X  ONCE IVP  Last administered on 6/22/20at 11:07;  

Start 6/22/20 at 10:30;  Stop 6/22/20 at 10:34;  Status DC


Potassium Acetate 60 meq/Magnesium Sulfate 14 meq/ Multivitamins 10 ml/Chromium/

Copper/Manganese/ Seleni/Zn 1 ml/ Insulin Human Regular 15 unit/ Sodium Chloride

20 meq/Total Parenteral Nutrition/Amino Acids/Dextrose/ Fat Emulsion Intravenous

1,920 ml @  80 mls/hr TPN  CONT IV  Last administered on 6/22/20at 21:54;  Start

6/22/20 at 22:00;  Stop 6/23/20 at 21:59;  Status DC


Potassium Acetate 30 meq/Magnesium Sulfate 14 meq/ Multivitamins 10 ml/Chromium/

Copper/Manganese/ Seleni/Zn 1 ml/ Insulin Human Regular 15 unit/ Sodium Chloride

20 meq/Potassium Chloride 30 meq/ Total Parenteral Nutrition/Amino Acids/Dext

verenice/ Fat Emulsion Intravenous 1,920 ml @  80 mls/hr TPN  CONT IV  Last 

administered on 6/23/20at 21:46;  Start 6/23/20 at 22:00;  Stop 6/24/20 at 

21:59;  Status DC


Sodium Chloride 80 meq/Potassium Chloride 30 meq/ Potassium Acetate 30 

meq/Magnesium Sulfate 14 meq/ Multivitamins 10 ml/Chromium/ Copper/Manganese/ 

Seleni/Zn 1 ml/ Insulin Human Regular 15 unit/ Total Parenteral Nutrition/Amino 

Acids/Dextrose/ Fat Emulsion Intravenous 1,920 ml @  80 mls/hr TPN  CONT IV  

Last administered on 6/24/20at 22:33;  Start 6/24/20 at 22:00;  Stop 6/25/20 at 

21:59;  Status DC


Furosemide (Lasix) 40 mg 1X  ONCE IVP  Last administered on 6/24/20at 16:27;  

Start 6/24/20 at 15:30;  Stop 6/24/20 at 15:33;  Status DC


Albumin Human 250 ml @  62.5 mls/hr 1X  ONCE IV  Last administered on 6/24/20at 

16:27;  Start 6/24/20 at 15:30;  Stop 6/24/20 at 19:29;  Status DC


Sodium Chloride 80 meq/Potassium Chloride 30 meq/ Potassium Acetate 30 

meq/Magnesium Sulfate 14 meq/ Multivitamins 10 ml/Chromium/ Copper/Manganese/ 

Seleni/Zn 1 ml/ Insulin Human Regular 15 unit/ Total Parenteral Nutrition/Amino 

Acids/Dextrose/ Fat Emulsion Intravenous 1,920 ml @  80 mls/hr TPN  CONT IV  

Last administered on 6/25/20at 22:25;  Start 6/25/20 at 22:00;  Stop 6/26/20 at 

21:59;  Status DC


Sodium Chloride 80 meq/Potassium Chloride 30 meq/ Potassium Acetate 30 

meq/Magnesium Sulfate 14 meq/ Multivitamins 10 ml/Chromium/ Copper/Manganese/ Se

aram/Zn 1 ml/ Insulin Human Regular 15 unit/ Total Parenteral Nutrition/Amino 

Acids/Dextrose/ Fat Emulsion Intravenous 1,920 ml @  80 mls/hr TPN  CONT IV  

Last administered on 6/26/20at 21:32;  Start 6/26/20 at 22:00;  Stop 6/27/20 at 

21:59;  Status DC


Sodium Chloride 80 meq/Potassium Chloride 30 meq/ Potassium Acetate 30 

meq/Magnesium Sulfate 14 meq/ Multivitamins 10 ml/Chromium/ Copper/Manganese/ 

Seleni/Zn 1 ml/ Insulin Human Regular 15 unit/ Total Parenteral Nutrition/Amino 

Acids/Dextrose/ Fat Emulsion Intravenous 1,920 ml @  80 mls/hr TPN  CONT IV  

Last administered on 6/27/20at 21:53;  Start 6/27/20 at 22:00;  Stop 6/28/20 at 

21:59;  Status DC


Acetylcysteine (Mucomyst 20% Resp Treatment) 600 mg RTBID NEB  Last administered

on 7/2/20at 08:00;  Start 6/27/20 at 12:00


Sodium Chloride 80 meq/Potassium Chloride 30 meq/ Potassium Acetate 30 

meq/Magnesium Sulfate 14 meq/ Multivitamins 10 ml/Chromium/ Copper/Manganese/ 

Seleni/Zn 1 ml/ Insulin Human Regular 15 unit/ Total Parenteral Nutrition/Amino 

Acids/Dextrose/ Fat Emulsion Intravenous 1,920 ml @  80 mls/hr TPN  CONT IV  

Last administered on 6/28/20at 22:06;  Start 6/28/20 at 22:00;  Stop 6/29/20 at 

21:59;  Status DC


Meropenem 500 mg/ Sodium Chloride 50 ml @  100 mls/hr Q6HRS IV  Last 

administered on 7/2/20at 12:31;  Start 6/28/20 at 18:00


Daptomycin 500 mg/ Sodium Chloride 50 ml @  100 mls/hr Q24H IV  Last 

administered on 7/1/20at 19:22;  Start 6/28/20 at 19:00


Sodium Chloride 80 meq/Potassium Chloride 30 meq/ Potassium Acetate 30 

meq/Magnesium Sulfate 14 meq/ Multivitamins 10 ml/Chromium/ Copper/Manganese/ 

Seleni/Zn 1 ml/ Insulin Human Regular 15 unit/ Total Parenteral Nutrition/Amino 

Acids/Dextrose/ Fat Emulsion Intravenous 1,920 ml @  80 mls/hr TPN  CONT IV  

Last administered on 6/29/20at 22:09;  Start 6/29/20 at 22:00;  Stop 6/30/20 at 

21:59;  Status DC


Heparin Sodium (Porcine) 1000 unit/Sodium Chloride 1,001 ml @  1,001 mls/hr 1X  

ONCE IRR ;  Start 6/30/20 at 06:00;  Stop 6/30/20 at 06:59;  Status DC


Propofol (Diprivan) 200 mg STK-MED ONCE IV ;  Start 6/30/20 at 07:44;  Stop 

6/30/20 at 07:44;  Status DC


Lidocaine HCl (Lidocaine Pf 2% Vial) 5 ml STK-MED ONCE .ROUTE ;  Start 6/30/20 

at 07:44;  Stop 6/30/20 at 07:44;  Status DC


Fentanyl Citrate (Fentanyl 2ml Vial) 100 mcg STK-MED ONCE .ROUTE ;  Start 

6/30/20 at 07:44;  Stop 6/30/20 at 07:44;  Status DC


Rocuronium Bromide (Zemuron) 100 mg STK-MED ONCE .ROUTE ;  Start 6/30/20 at 

07:44;  Stop 6/30/20 at 07:44;  Status DC


Micafungin Sodium 100 mg/Dextrose 100 ml @  100 mls/hr Q24H IV  Last 

administered on 7/2/20at 08:52;  Start 6/30/20 at 08:30


Bupivacaine HCl/ Epinephrine Bitart (Sensorcain-Epi 0.5%-1:761685 Mpf) 30 ml 

STK-MED ONCE .ROUTE ;  Start 6/30/20 at 08:34;  Stop 6/30/20 at 08:35;  Status 

DC


Iohexol (Omnipaque 300 Mg/ml) 50 ml STK-MED ONCE .ROUTE  Last administered on 

6/30/20at 13:30;  Start 6/30/20 at 08:35;  Stop 6/30/20 at 08:35;  Status DC


Sodium Chloride 80 meq/Potassium Chloride 30 meq/ Potassium Acetate 30 

meq/Magnesium Sulfate 14 meq/ Multivitamins 10 ml/Chromium/ Copper/Manganese/ 

Seleni/Zn 1 ml/ Insulin Human Regular 15 unit/ Total Parenteral Nutrition/Amino 

Acids/Dextrose/ Fat Emulsion Intravenous 1,920 ml @  80 mls/hr TPN  CONT IV  

Last administered on 7/1/20at 01:22;  Start 6/30/20 at 22:00;  Stop 7/1/20 at 

21:59;  Status DC


Phenylephrine HCl (Ken-Synephrine Inj) 10 mg STK-MED ONCE .ROUTE ;  Start 

6/30/20 at 10:15;  Stop 6/30/20 at 10:15;  Status DC


Desflurane (Suprane) 90 ml STK-MED ONCE IH ;  Start 6/30/20 at 10:18;  Stop 

6/30/20 at 10:19;  Status DC


Albumin Human 500 ml @  As Directed STK-MED ONCE IV ;  Start 6/30/20 at 11:06;  

Stop 6/30/20 at 11:06;  Status DC


Vasopressin (Vasostrict) 20 unit STK-MED ONCE .ROUTE ;  Start 6/30/20 at 12:23; 

Stop 6/30/20 at 12:23;  Status DC


Phenylephrine HCl (Ken-Synephrine Inj) 10 mg STK-MED ONCE .ROUTE ;  Start 

6/30/20 at 13:33;  Stop 6/30/20 at 13:33;  Status DC


Phenylephrine HCl (Ken-Synephrine Inj) 10 mg STK-MED ONCE .ROUTE ;  Start 6 /30/20 at 13:33;  Stop 6/30/20 at 13:33;  Status DC


Ondansetron HCl (Zofran) 4 mg STK-MED ONCE .ROUTE ;  Start 6/30/20 at 13:33;  

Stop 6/30/20 at 13:33;  Status DC


Enoxaparin Sodium (Lovenox 40mg Syringe) 40 mg Q24H SQ ;  Start 7/1/20 at 08:00


Sodium Chloride (Normal Saline Flush) 3 ml QSHIFT  PRN IV AFTER MEDS AND BLOOD 

DRAWS;  Start 6/30/20 at 14:45


Naloxone HCl (Narcan) 0.4 mg PRN Q2MIN  PRN IV SEE INSTRUCTIONS;  Start 6/30/20 

at 14:45


Sodium Chloride 1,000 ml @  25 mls/hr Q24H IV  Last administered on 7/1/20at 

15:28;  Start 6/30/20 at 14:33


Morphine Sulfate (Morphine Sulfate) 1 mg PRN Q1HR  PRN IV PAIN;  Start 6/30/20 

at 14:45


Midazolam HCl 100 mg/Sodium Chloride 100 ml @ 1 mls/hr CONT  PRN IV SEE I/O 

RECORD Last administered on 7/2/20at 12:31;  Start 6/30/20 at 14:45


Phenylephrine HCl (PHENYLEPHRINE in 0.9% NACL PF) 1 mg STK-MED ONCE IV ;  Start 

6/30/20 at 14:44;  Stop 6/30/20 at 14:45;  Status DC


Ephedrine Sulfate (ePHEDrine PF IN SALINE SYRINGE) 50 mg STK-MED ONCE IV ;  

Start 6/30/20 at 14:45;  Stop 6/30/20 at 14:45;  Status DC


Vasopressin 20 unit/Dextrose 101 ml @  12 mls/hr CONT  PRN IV SEE I/O RECORD 

Last administered on 7/2/20at 12:31;  Start 6/30/20 at 15:30


Sodium Chloride 1,000 ml @  1,000 mls/hr 1X  ONCE IV  Last administered on 

6/30/20at 15:42;  Start 6/30/20 at 15:45;  Stop 6/30/20 at 16:44;  Status DC


Albumin Human 500 ml @  125 mls/hr 1X  ONCE IV ;  Start 6/30/20 at 16:00;  Stop 

6/30/20 at 19:59;  Status DC


Albumin Human 500 ml @  125 mls/hr PRN Q1HR  PRN IV PER PROTOCOL;  Start 6/30/20

at 15:45


Magnesium Sulfate 50 ml @ 25 mls/hr 1X  ONCE IV  Last administered on 6/30/20at 

17:02;  Start 6/30/20 at 16:30;  Stop 6/30/20 at 18:29;  Status DC


Sodium Bicarbonate (Sodium Bicarb Adult 8.4% Syr) 50 meq STK-MED ONCE .ROUTE ;  

Start 6/30/20 at 16:20;  Stop 6/30/20 at 16:20;  Status DC


Sodium Bicarbonate (Sodium Bicarb Adult 8.4% Syr) 100 meq 1X  ONCE IV  Last 

administered on 6/30/20at 17:07;  Start 6/30/20 at 16:30;  Stop 6/30/20 at 

16:31;  Status DC


Sodium Bicarbonate 150 meq/Dextrose 1,150 ml @  75 mls/hr 1X  ONCE IV  Last 

administered on 6/30/20at 20:02;  Start 6/30/20 at 16:30;  Stop 7/1/20 at 07:49;

 Status DC


Sodium Chloride 80 meq/Potassium Chloride 30 meq/ Potassium Acetate 30 

meq/Magnesium Sulfate 14 meq/ Multivitamins 10 ml/Chromium/ Copper/Manganese/ 

Seleni/Zn 1 ml/ Insulin Human Regular 15 unit/ Total Parenteral Nutrition/Amino 

Acids/Dextrose/ Fat Emulsion Intravenous 1,920 ml @  80 mls/hr TPN  CONT IV  

Last administered on 7/1/20at 23:05;  Start 7/1/20 at 22:00;  Stop 7/2/20 at 

21:59


Sodium Chloride 100 meq/Potassium Chloride 30 meq/ Potassium Acetate 30 

meq/Magnesium Sulfate 12 meq/ Multivitamins 10 ml/Chromium/ Copper/Manganese/ 

Seleni/Zn 1 ml/ Insulin Human Regular 15 unit/ Total Parenteral Nutrition/Amino 

Acids/Dextrose/ Fat Emulsion Intravenous 1,920 ml @  80 mls/hr TPN  CONT IV ;  

Start 7/2/20 at 22:00;  Stop 7/3/20 at 21:59





Active Scripts


Active


Reported


Bisoprolol Fumarate 5 Mg Tablet 10 Mg PO DAILY


Vitals/I & O





Vital Sign - Last 24 Hours








 7/1/20 7/1/20 7/1/20 7/1/20





 15:27 15:46 15:46 16:00


 


Temp    99.4





    99.4


 


Pulse    116


 


Resp 12   12


 


B/P (MAP)    106/49 (68)


 


Pulse Ox 94   100


 


O2 Delivery Ventilator  Mechanical Ventilator Ventilator


 


    





    





 7/1/20 7/1/20 7/1/20 7/1/20





 16:11 17:00 18:00 19:00


 


Pulse  110 104 105


 


Resp  12 12 22


 


B/P (MAP)  92/50 (64) 90/55 (67) 91/58 (69)


 


Pulse Ox 96 100 100 100


 


O2 Delivery Ventilator Ventilator Ventilator Ventilator





 7/1/20 7/1/20 7/1/20 7/1/20





 19:27 19:57 20:00 20:00


 


Resp 12 19  


 


B/P (MAP)    


 


Pulse Ox 100 100  


 


O2 Delivery Ventilator Ventilator Mechanical Ventilator 


 


O2 Flow Rate  40.0  





 7/1/20 7/1/20 7/1/20 7/1/20





 20:00 20:23 20:29 20:52


 


Temp 98.1 98.1  98.8





 98.1 98.1  98.8


 


Pulse 103 105  106


 


Resp 23 22  20


 


B/P (MAP) 96/45 (62) 92/52  122/55


 


Pulse Ox 100  100 


 


O2 Delivery Ventilator  Ventilator 


 


    





    





 7/1/20 7/1/20 7/1/20 7/1/20





 21:00 22:00 22:33 23:00


 


Temp   99.1 





   99.1 


 


Pulse 100 104 108 104


 


Resp 22 22 19 15


 


B/P (MAP) 118/58 (78) 110/61 (77) 108/57 103/65 (78)


 


Pulse Ox 100 99  99


 


O2 Delivery Ventilator Ventilator  Ventilator


 


    





    





 7/1/20 7/2/20 7/2/20 7/2/20





 23:02 00:00 00:00 00:01


 


Temp    98.9





    98.9


 


Pulse    104


 


Resp    14


 


B/P (MAP)    96/56 (69)


 


Pulse Ox 97   97


 


O2 Delivery Ventilator  Mechanical Ventilator Ventilator


 


    





    





 7/2/20 7/2/20 7/2/20 7/2/20





 00:48 01:00 01:05 01:18


 


Pulse  104  


 


Resp 16 19  19


 


B/P (MAP)  105/71 (82)  


 


Pulse Ox 97 98 98 98


 


O2 Delivery  Ventilator Ventilator Ventilator


 


O2 Flow Rate 40.0   40.0





 7/2/20 7/2/20 7/2/20 7/2/20





 02:00 03:00 04:00 04:00


 


Temp    99.2





    99.2


 


Pulse 101 98  103


 


Resp 19 17  17


 


B/P (MAP) 97/56 (70) 106/62 (77)  92/62 (72)


 


Pulse Ox 98 99  99


 


O2 Delivery Ventilator Ventilator Mechanical Ventilator Ventilator


 


    





    





 7/2/20 7/2/20 7/2/20 7/2/20





 04:00 04:30 05:00 06:00


 


Pulse   101 92


 


Resp   17 20


 


B/P (MAP)   107/74 (85) 122/80 (94)


 


Pulse Ox  98 99 99


 


O2 Delivery  Ventilator Ventilator Ventilator





 7/2/20 7/2/20 7/2/20 7/2/20





 06:41 07:00 08:00 08:00


 


Pulse  72  76


 


Resp 14 16  20


 


B/P (MAP)  104/56 (72)  109/80 (90)


 


Pulse Ox 99 99  99


 


O2 Delivery  Ventilator  Ventilator


 


O2 Flow Rate 40.0   





 7/2/20 7/2/20 7/2/20 7/2/20





 08:00 08:35 08:37 09:00


 


Temp    98.6





    98.6


 


Pulse    87


 


Resp   18 20


 


B/P (MAP)    130/60 (83)


 


Pulse Ox  99 99 99


 


O2 Delivery Mechanical Ventilator Ventilator  Ventilator


 


    





    





 7/2/20 7/2/20 7/2/20 7/2/20





 09:35 10:00 11:00 11:52


 


Pulse  92 91 


 


Resp  16 16 


 


B/P (MAP)  94/48 (63) 113/58 (76) 


 


Pulse Ox 99 99 99 99


 


O2 Delivery Ventilator Ventilator Ventilator Ventilator





 7/2/20 7/2/20 7/2/20 7/2/20





 12:00 12:00 12:00 12:32


 


Temp  97.7  





  97.7  


 


Pulse  88  


 


Resp  16  16


 


B/P (MAP)  134/62 (86)  


 


Pulse Ox  99  99


 


O2 Delivery Mechanical Ventilator Ventilator  Ventilator


 


    





    





 7/2/20 7/2/20 7/2/20 7/2/20





 13:00 13:07 13:13 14:00


 


Pulse 79   98


 


Resp 16  16 16


 


B/P (MAP) 120/62 (81)   88/76 (80)


 


Pulse Ox 99 100 100 99


 


O2 Delivery Ventilator Ventilator Ventilator Ventilator














Intake and Output   


 


 7/1/20 7/1/20 7/2/20





 15:00 23:00 07:00


 


Intake Total 275 ml 2124.10 ml 1845.7 ml


 


Output Total 650 ml 975 ml 810 ml


 


Balance -375 ml 1149.10 ml 1035.7 ml











Justicifation of Admission Dx:


Justifications for Admission:


Justification of Admission Dx:  Yes











MG ARGUETA MD                  Jul 2, 2020 15:04

## 2020-07-02 NOTE — NUR
Pharmacy TPN Dosing Note



S: JESENIA BEAN is a 49 year old F Currently receiving Central Continuous TPN started 
03/18/20



B:Pertinent PMH: 

Necrotizing pancreatitis

Height: 5 feet, 8 inches

Weight: 96.0 kg



Current diet: NPO 



LABS:

Sodium:    134 

Potassium: 4 

Chloride:  103 

Calcium:   8.9 

Corrected Calcium: 11.22 

Magnesium: 2.1 

CO2:       28 

SCr:       0.5 

Glucose:   235 

Albumin:   1.1 

AST:       18 

ALT:       12 



TPN FORMULA:

TPN TYPE:  Central Continuous

AMINO ACIDS:         80 gm

DEXTROSE:            250 gm

LIPIDS:              20 gm

SODIUM CHLORIDE:     100 mEq

SODIUM ACETATE:      - mEq

SODIUM PHOSPHATE:    - mmol

POTASSIUM CHLORIDE:  30 mEq

POTASSIUM ACETATE:   30 mEq

POTASSIUM PHOSPHATE: - mmol

MAGNESIUM:           12 mEq

CALCIUM:             - mEq

INSULIN:             15 units

MULTIPLE VITAMIN:    10 ml

TRACE ELEMENTS:      1 ml(s)



TPN PLAN:  

increase nacl to 100 meq AND decrease magso4 to 12 meq





R: continue TPN at 80 ml/hr

Will monitor electrolytes, glucose, and tolerance to TPN.



 YENIFER STREETER [facility], 07/02/20 1147

## 2020-07-03 VITALS — SYSTOLIC BLOOD PRESSURE: 114 MMHG | DIASTOLIC BLOOD PRESSURE: 63 MMHG

## 2020-07-03 VITALS — SYSTOLIC BLOOD PRESSURE: 104 MMHG | DIASTOLIC BLOOD PRESSURE: 64 MMHG

## 2020-07-03 VITALS — DIASTOLIC BLOOD PRESSURE: 60 MMHG | SYSTOLIC BLOOD PRESSURE: 105 MMHG

## 2020-07-03 VITALS — SYSTOLIC BLOOD PRESSURE: 108 MMHG | DIASTOLIC BLOOD PRESSURE: 67 MMHG

## 2020-07-03 VITALS — SYSTOLIC BLOOD PRESSURE: 110 MMHG | DIASTOLIC BLOOD PRESSURE: 65 MMHG

## 2020-07-03 VITALS — SYSTOLIC BLOOD PRESSURE: 127 MMHG | DIASTOLIC BLOOD PRESSURE: 75 MMHG

## 2020-07-03 VITALS — DIASTOLIC BLOOD PRESSURE: 63 MMHG | SYSTOLIC BLOOD PRESSURE: 110 MMHG

## 2020-07-03 VITALS — DIASTOLIC BLOOD PRESSURE: 61 MMHG | SYSTOLIC BLOOD PRESSURE: 102 MMHG

## 2020-07-03 VITALS — SYSTOLIC BLOOD PRESSURE: 138 MMHG | DIASTOLIC BLOOD PRESSURE: 73 MMHG

## 2020-07-03 VITALS — SYSTOLIC BLOOD PRESSURE: 105 MMHG | DIASTOLIC BLOOD PRESSURE: 57 MMHG

## 2020-07-03 VITALS — SYSTOLIC BLOOD PRESSURE: 132 MMHG | DIASTOLIC BLOOD PRESSURE: 72 MMHG

## 2020-07-03 VITALS — SYSTOLIC BLOOD PRESSURE: 100 MMHG | DIASTOLIC BLOOD PRESSURE: 57 MMHG

## 2020-07-03 VITALS — SYSTOLIC BLOOD PRESSURE: 102 MMHG | DIASTOLIC BLOOD PRESSURE: 61 MMHG

## 2020-07-03 VITALS — DIASTOLIC BLOOD PRESSURE: 58 MMHG | SYSTOLIC BLOOD PRESSURE: 113 MMHG

## 2020-07-03 VITALS — DIASTOLIC BLOOD PRESSURE: 76 MMHG | SYSTOLIC BLOOD PRESSURE: 131 MMHG

## 2020-07-03 VITALS — DIASTOLIC BLOOD PRESSURE: 74 MMHG | SYSTOLIC BLOOD PRESSURE: 131 MMHG

## 2020-07-03 VITALS — SYSTOLIC BLOOD PRESSURE: 140 MMHG | DIASTOLIC BLOOD PRESSURE: 76 MMHG

## 2020-07-03 VITALS — SYSTOLIC BLOOD PRESSURE: 111 MMHG | DIASTOLIC BLOOD PRESSURE: 69 MMHG

## 2020-07-03 VITALS — DIASTOLIC BLOOD PRESSURE: 56 MMHG | SYSTOLIC BLOOD PRESSURE: 98 MMHG

## 2020-07-03 VITALS — SYSTOLIC BLOOD PRESSURE: 128 MMHG | DIASTOLIC BLOOD PRESSURE: 59 MMHG

## 2020-07-03 VITALS — SYSTOLIC BLOOD PRESSURE: 92 MMHG | DIASTOLIC BLOOD PRESSURE: 52 MMHG

## 2020-07-03 VITALS — SYSTOLIC BLOOD PRESSURE: 103 MMHG | DIASTOLIC BLOOD PRESSURE: 58 MMHG

## 2020-07-03 VITALS — DIASTOLIC BLOOD PRESSURE: 77 MMHG | SYSTOLIC BLOOD PRESSURE: 135 MMHG

## 2020-07-03 VITALS — SYSTOLIC BLOOD PRESSURE: 105 MMHG | DIASTOLIC BLOOD PRESSURE: 60 MMHG

## 2020-07-03 VITALS — DIASTOLIC BLOOD PRESSURE: 75 MMHG | SYSTOLIC BLOOD PRESSURE: 125 MMHG

## 2020-07-03 VITALS — DIASTOLIC BLOOD PRESSURE: 59 MMHG | SYSTOLIC BLOOD PRESSURE: 128 MMHG

## 2020-07-03 VITALS — DIASTOLIC BLOOD PRESSURE: 60 MMHG | SYSTOLIC BLOOD PRESSURE: 116 MMHG

## 2020-07-03 VITALS — DIASTOLIC BLOOD PRESSURE: 64 MMHG | SYSTOLIC BLOOD PRESSURE: 118 MMHG

## 2020-07-03 VITALS — DIASTOLIC BLOOD PRESSURE: 62 MMHG | SYSTOLIC BLOOD PRESSURE: 109 MMHG

## 2020-07-03 VITALS — DIASTOLIC BLOOD PRESSURE: 53 MMHG | SYSTOLIC BLOOD PRESSURE: 94 MMHG

## 2020-07-03 VITALS — SYSTOLIC BLOOD PRESSURE: 121 MMHG | DIASTOLIC BLOOD PRESSURE: 72 MMHG

## 2020-07-03 VITALS — SYSTOLIC BLOOD PRESSURE: 114 MMHG | DIASTOLIC BLOOD PRESSURE: 68 MMHG

## 2020-07-03 VITALS — SYSTOLIC BLOOD PRESSURE: 132 MMHG | DIASTOLIC BLOOD PRESSURE: 70 MMHG

## 2020-07-03 LAB
% BANDS: 7 % (ref 0–9)
% LYMPHS: 4 % (ref 24–48)
% MONOS: 2 % (ref 0–10)
% SEGS: 87 % (ref 35–66)
ALBUMIN SERPL-MCNC: 1 G/DL (ref 3.4–5)
ALBUMIN/GLOB SERPL: 0.4 {RATIO} (ref 1–1.7)
ALP SERPL-CCNC: 199 U/L (ref 46–116)
ALT SERPL-CCNC: 98 U/L (ref 14–59)
ANION GAP SERPL CALC-SCNC: 3 MMOL/L (ref 6–14)
ANISOCYTOSIS BLD QL SMEAR: PRESENT
AST SERPL-CCNC: 42 U/L (ref 15–37)
BASE EXCESS ABG: 1 MMOL/L (ref -3–3)
BASOPHILS # BLD AUTO: 0 X10^3/UL (ref 0–0.2)
BASOPHILS NFR BLD: 0 % (ref 0–3)
BILIRUB SERPL-MCNC: 0.6 MG/DL (ref 0.2–1)
BUN SERPL-MCNC: 20 MG/DL (ref 7–20)
BUN/CREAT SERPL: 33 (ref 6–20)
CALCIUM SERPL-MCNC: 8.6 MG/DL (ref 8.5–10.1)
CHLORIDE SERPL-SCNC: 102 MMOL/L (ref 98–107)
CO2 SERPL-SCNC: 28 MMOL/L (ref 21–32)
CREAT SERPL-MCNC: 0.6 MG/DL (ref 0.6–1)
EOSINOPHIL NFR BLD: 0.3 X10^3/UL (ref 0–0.7)
EOSINOPHIL NFR BLD: 1 % (ref 0–3)
ERYTHROCYTE [DISTWIDTH] IN BLOOD BY AUTOMATED COUNT: 15 % (ref 11.5–14.5)
GFR SERPLBLD BASED ON 1.73 SQ M-ARVRAT: 106.3 ML/MIN
GLOBULIN SER-MCNC: 2.7 G/DL (ref 2.2–3.8)
GLUCOSE SERPL-MCNC: 224 MG/DL (ref 70–99)
HCO3 BLDA-SCNC: 27 MMOL/L (ref 21–28)
HCT VFR BLD CALC: 25 % (ref 36–47)
HGB BLD-MCNC: 8.3 G/DL (ref 12–15.5)
INSPIRATION SETTING TIME VENT: 40
LYMPHOCYTES # BLD: 1.3 X10^3/UL (ref 1–4.8)
LYMPHOCYTES NFR BLD AUTO: 4 % (ref 24–48)
MCH RBC QN AUTO: 29 PG (ref 25–35)
MCHC RBC AUTO-ENTMCNC: 33 G/DL (ref 31–37)
MCV RBC AUTO: 88 FL (ref 79–100)
MONO #: 1.5 X10^3/UL (ref 0–1.1)
MONOCYTES NFR BLD: 5 % (ref 0–9)
NEUT #: 26.6 X10^3/UL (ref 1.8–7.7)
NEUTROPHILS NFR BLD AUTO: 90 % (ref 31–73)
PCO2 BLDA: 48 MMHG (ref 35–46)
PLATELET # BLD AUTO: 260 X10^3/UL (ref 140–400)
PLATELET # BLD EST: ADEQUATE 10*3/UL
PO2 BLDA: 54 MMHG (ref 75–108)
POLYCHROMASIA BLD QL SMEAR: PRESENT
POTASSIUM SERPL-SCNC: 3.9 MMOL/L (ref 3.5–5.1)
PROT SERPL-MCNC: 3.7 G/DL (ref 6.4–8.2)
RBC # BLD AUTO: 2.85 X10^6/UL (ref 3.5–5.4)
SAO2 % BLDA: 85 % (ref 92–99)
SODIUM SERPL-SCNC: 133 MMOL/L (ref 136–145)
WBC # BLD AUTO: 29.7 X10^3/UL (ref 4–11)

## 2020-07-03 RX ADMIN — INSULIN LISPRO SCH UNITS: 100 INJECTION, SOLUTION INTRAVENOUS; SUBCUTANEOUS at 00:00

## 2020-07-03 RX ADMIN — DEXTROSE SCH MLS/HR: 50 INJECTION, SOLUTION INTRAVENOUS at 07:56

## 2020-07-03 RX ADMIN — ACETYLCYSTEINE SCH MG: 200 INHALANT RESPIRATORY (INHALATION) at 08:00

## 2020-07-03 RX ADMIN — DILTIAZEM HYDROCHLORIDE PRN MLS/HR: 5 INJECTION INTRAVENOUS at 10:01

## 2020-07-03 RX ADMIN — DEXTROSE PRN MLS/HR: 50 INJECTION, SOLUTION INTRAVENOUS at 07:26

## 2020-07-03 RX ADMIN — MEROPENEM SCH MLS/HR: 500 INJECTION, POWDER, FOR SOLUTION INTRAVENOUS at 18:34

## 2020-07-03 RX ADMIN — Medication PRN EACH: at 11:45

## 2020-07-03 RX ADMIN — IPRATROPIUM BROMIDE AND ALBUTEROL SULFATE SCH ML: .5; 3 SOLUTION RESPIRATORY (INHALATION) at 00:39

## 2020-07-03 RX ADMIN — BACITRACIN SCH MLS/HR: 5000 INJECTION, POWDER, FOR SOLUTION INTRAMUSCULAR at 14:33

## 2020-07-03 RX ADMIN — DEXTROSE PRN MLS/HR: 50 INJECTION, SOLUTION INTRAVENOUS at 17:39

## 2020-07-03 RX ADMIN — IPRATROPIUM BROMIDE AND ALBUTEROL SULFATE SCH ML: .5; 3 SOLUTION RESPIRATORY (INHALATION) at 19:48

## 2020-07-03 RX ADMIN — INSULIN LISPRO SCH UNITS: 100 INJECTION, SOLUTION INTRAVENOUS; SUBCUTANEOUS at 18:00

## 2020-07-03 RX ADMIN — INSULIN LISPRO SCH UNITS: 100 INJECTION, SOLUTION INTRAVENOUS; SUBCUTANEOUS at 23:59

## 2020-07-03 RX ADMIN — ENOXAPARIN SODIUM SCH MG: 40 INJECTION SUBCUTANEOUS at 07:43

## 2020-07-03 RX ADMIN — MEROPENEM SCH MLS/HR: 500 INJECTION, POWDER, FOR SOLUTION INTRAVENOUS at 12:27

## 2020-07-03 RX ADMIN — MEROPENEM SCH MLS/HR: 500 INJECTION, POWDER, FOR SOLUTION INTRAVENOUS at 00:08

## 2020-07-03 RX ADMIN — IPRATROPIUM BROMIDE AND ALBUTEROL SULFATE SCH ML: .5; 3 SOLUTION RESPIRATORY (INHALATION) at 11:25

## 2020-07-03 RX ADMIN — PANTOPRAZOLE SODIUM SCH MG: 40 INJECTION, POWDER, FOR SOLUTION INTRAVENOUS at 07:56

## 2020-07-03 RX ADMIN — INSULIN LISPRO SCH UNITS: 100 INJECTION, SOLUTION INTRAVENOUS; SUBCUTANEOUS at 12:30

## 2020-07-03 RX ADMIN — INSULIN LISPRO SCH UNITS: 100 INJECTION, SOLUTION INTRAVENOUS; SUBCUTANEOUS at 06:00

## 2020-07-03 RX ADMIN — DILTIAZEM HYDROCHLORIDE PRN MLS/HR: 5 INJECTION INTRAVENOUS at 18:48

## 2020-07-03 RX ADMIN — IPRATROPIUM BROMIDE AND ALBUTEROL SULFATE SCH ML: .5; 3 SOLUTION RESPIRATORY (INHALATION) at 08:00

## 2020-07-03 RX ADMIN — DAPTOMYCIN SCH MLS/HR: 500 INJECTION, POWDER, LYOPHILIZED, FOR SOLUTION INTRAVENOUS at 20:01

## 2020-07-03 RX ADMIN — ACETYLCYSTEINE SCH MG: 200 INHALANT RESPIRATORY (INHALATION) at 19:48

## 2020-07-03 RX ADMIN — MEROPENEM SCH MLS/HR: 500 INJECTION, POWDER, FOR SOLUTION INTRAVENOUS at 05:38

## 2020-07-03 RX ADMIN — IPRATROPIUM BROMIDE AND ALBUTEROL SULFATE SCH ML: .5; 3 SOLUTION RESPIRATORY (INHALATION) at 15:38

## 2020-07-03 RX ADMIN — IPRATROPIUM BROMIDE AND ALBUTEROL SULFATE SCH ML: .5; 3 SOLUTION RESPIRATORY (INHALATION) at 03:55

## 2020-07-03 NOTE — PDOC
SURGICAL PROGRESS NOTE


Subjective


Patient sedated on ventilator


Vital Signs





Vital Signs








  Date Time  Temp Pulse Resp B/P (MAP) Pulse Ox O2 Delivery O2 Flow Rate FiO2


 


7/3/20 09:00  75 14 140/76 (97) 99 Ventilator  


 


7/3/20 08:00 98.8       





 98.8       


 


7/3/20 06:09       40.0 








I&O











Intake and Output 


 


 7/3/20





 07:00


 


Intake Total 2018.79 ml


 


Output Total 2430 ml


 


Balance -411.21 ml


 


 


 


IV Total 1448.79 ml


 


Tube Feeding 420 ml


 


Other 150 ml


 


Output Urine Total 985 ml


 


Chest Tube Drainage Total 150 ml


 


Drainage Total 1295 ml








PATIENT HAS A VILLASENOR:  Yes


General:  Other (Sedated on the ventilator)


Abdomen:  Soft, Other (Wound VAC in place drain output has decreased in all 

drains, awaiting return of bowel function prior to increasing tube feeds)


Labs





Laboratory Tests








Test


 7/1/20


11:55 7/1/20


16:30 7/1/20


17:49 7/2/20


00:19


 


O2 Saturation 97 % (92-99)    


 


Arterial Blood pH


 7.38


(7.35-7.45) 


 


 





 


Arterial Blood pCO2 at


Patient Temp 38 mmHg


(35-46) 


 


 





 


Arterial Blood pO2 at Patient


Temp 111 mmHg


() 


 


 





 


Arterial Blood HCO3


 22 mmol/L


(21-28) 


 


 





 


Arterial Blood Base Excess


 -3 mmol/L


(-3-3) 


 


 





 


FiO2 40    


 


Glucose (Fingerstick)


 235 mg/dL


(70-99) 


 201 mg/dL


(70-99) 200 mg/dL


(70-99)


 


Hemoglobin


 


 7.3 g/dL


(12.0-15.5) 


 





 


Hematocrit


 


 21.4 %


(36.0-47.0) 


 





 


Mean Corpuscular Hemoglobin


Concent 


 34 g/dL


(31-37) 


 





 


Test


 7/2/20


06:09 7/2/20


06:10 7/2/20


08:00 7/2/20


12:43


 


Glucose (Fingerstick)


 157 mg/dL


(70-99) 


 


 184 mg/dL


(70-99)


 


White Blood Count


 


 38.3 x10^3/uL


(4.0-11.0) 


 





 


Red Blood Count


 


 3.06 x10^6/uL


(3.50-5.40) 


 





 


Hemoglobin


 


 9.0 g/dL


(12.0-15.5) 


 





 


Hematocrit


 


 26.8 %


(36.0-47.0) 


 





 


Mean Corpuscular Volume  88 fL ()   


 


Mean Corpuscular Hemoglobin  29 pg (25-35)   


 


Mean Corpuscular Hemoglobin


Concent 


 34 g/dL


(31-37) 


 





 


Red Cell Distribution Width


 


 14.8 %


(11.5-14.5) 


 





 


Platelet Count


 


 278 x10^3/uL


(140-400) 


 





 


Sodium Level


 


 134 mmol/L


(136-145) 


 





 


Potassium Level


 


 4.0 mmol/L


(3.5-5.1) 


 





 


Chloride Level


 


 103 mmol/L


() 


 





 


Carbon Dioxide Level


 


 28 mmol/L


(21-32) 


 





 


Anion Gap  3 (6-14)   


 


Blood Urea Nitrogen


 


 24 mg/dL


(7-20) 


 





 


Creatinine


 


 0.7 mg/dL


(0.6-1.0) 


 





 


Estimated GFR


(Cockcroft-Gault) 


 88.9 


 


 





 


Glucose Level


 


 178 mg/dL


(70-99) 


 





 


Calcium Level


 


 8.9 mg/dL


(8.5-10.1) 


 





 


Phosphorus Level


 


 4.2 mg/dL


(2.6-4.7) 


 





 


Magnesium Level


 


 2.1 mg/dL


(1.8-2.4) 


 





 


O2 Saturation   98 % (92-99)  


 


Arterial Blood pH


 


 


 7.36


(7.35-7.45) 





 


Arterial Blood pCO2 at


Patient Temp 


 


 41 mmHg


(35-46) 





 


Arterial Blood pO2 at Patient


Temp 


 


 127 mmHg


() 





 


Arterial Blood HCO3


 


 


 23 mmol/L


(21-28) 





 


Arterial Blood Base Excess


 


 


 -2 mmol/L


(-3-3) 





 


FiO2   40  


 


Test


 7/2/20


18:10 7/3/20


05:30 7/3/20


06:01 7/3/20


08:00


 


Glucose (Fingerstick)


 176 mg/dL


(70-99) 


 150 mg/dL


(70-99) 





 


White Blood Count


 


 29.7 x10^3/uL


(4.0-11.0) 


 





 


Red Blood Count


 


 2.85 x10^6/uL


(3.50-5.40) 


 





 


Hemoglobin


 


 8.3 g/dL


(12.0-15.5) 


 





 


Hematocrit


 


 25.0 %


(36.0-47.0) 


 





 


Mean Corpuscular Volume  88 fL ()   


 


Mean Corpuscular Hemoglobin  29 pg (25-35)   


 


Mean Corpuscular Hemoglobin


Concent 


 33 g/dL


(31-37) 


 





 


Red Cell Distribution Width


 


 15.0 %


(11.5-14.5) 


 





 


Platelet Count


 


 260 x10^3/uL


(140-400) 


 





 


Neutrophils (%) (Auto)  90 % (31-73)   


 


Lymphocytes (%) (Auto)  4 % (24-48)   


 


Monocytes (%) (Auto)  5 % (0-9)   


 


Eosinophils (%) (Auto)  1 % (0-3)   


 


Basophils (%) (Auto)  0 % (0-3)   


 


Neutrophils # (Auto)


 


 26.6 x10^3/uL


(1.8-7.7) 


 





 


Lymphocytes # (Auto)


 


 1.3 x10^3/uL


(1.0-4.8) 


 





 


Monocytes # (Auto)


 


 1.5 x10^3/uL


(0.0-1.1) 


 





 


Eosinophils # (Auto)


 


 0.3 x10^3/uL


(0.0-0.7) 


 





 


Basophils # (Auto)


 


 0.0 x10^3/uL


(0.0-0.2) 


 





 


Segmented Neutrophils %  87 % (35-66)   


 


Band Neutrophils %  7 % (0-9)   


 


Lymphocytes %  4 % (24-48)   


 


Monocytes %  2 % (0-10)   


 


Platelet Estimate


 


 Adequate


(ADEQUATE) 


 





 


Polychromasia  Present   


 


Anisocytosis  Present   


 


Creatine Kinase


 


 17 U/L


() 


 





 


O2 Saturation    85 % (92-99) 


 


Arterial Blood pH


 


 


 


 7.36


(7.35-7.45)


 


Arterial Blood pCO2 at


Patient Temp 


 


 


 48 mmHg


(35-46)


 


Arterial Blood pO2 at Patient


Temp 


 


 


 54 mmHg


()


 


Arterial Blood HCO3


 


 


 


 27 mmol/L


(21-28)


 


Arterial Blood Base Excess


 


 


 


 1 mmol/L


(-3-3)


 


FiO2    40 








Laboratory Tests








Test


 7/2/20


12:43 7/2/20


18:10 7/3/20


05:30 7/3/20


06:01


 


Glucose (Fingerstick)


 184 mg/dL


(70-99) 176 mg/dL


(70-99) 


 150 mg/dL


(70-99)


 


White Blood Count


 


 


 29.7 x10^3/uL


(4.0-11.0) 





 


Red Blood Count


 


 


 2.85 x10^6/uL


(3.50-5.40) 





 


Hemoglobin


 


 


 8.3 g/dL


(12.0-15.5) 





 


Hematocrit


 


 


 25.0 %


(36.0-47.0) 





 


Mean Corpuscular Volume   88 fL ()  


 


Mean Corpuscular Hemoglobin   29 pg (25-35)  


 


Mean Corpuscular Hemoglobin


Concent 


 


 33 g/dL


(31-37) 





 


Red Cell Distribution Width


 


 


 15.0 %


(11.5-14.5) 





 


Platelet Count


 


 


 260 x10^3/uL


(140-400) 





 


Neutrophils (%) (Auto)   90 % (31-73)  


 


Lymphocytes (%) (Auto)   4 % (24-48)  


 


Monocytes (%) (Auto)   5 % (0-9)  


 


Eosinophils (%) (Auto)   1 % (0-3)  


 


Basophils (%) (Auto)   0 % (0-3)  


 


Neutrophils # (Auto)


 


 


 26.6 x10^3/uL


(1.8-7.7) 





 


Lymphocytes # (Auto)


 


 


 1.3 x10^3/uL


(1.0-4.8) 





 


Monocytes # (Auto)


 


 


 1.5 x10^3/uL


(0.0-1.1) 





 


Eosinophils # (Auto)


 


 


 0.3 x10^3/uL


(0.0-0.7) 





 


Basophils # (Auto)


 


 


 0.0 x10^3/uL


(0.0-0.2) 





 


Segmented Neutrophils %   87 % (35-66)  


 


Band Neutrophils %   7 % (0-9)  


 


Lymphocytes %   4 % (24-48)  


 


Monocytes %   2 % (0-10)  


 


Platelet Estimate


 


 


 Adequate


(ADEQUATE) 





 


Polychromasia   Present  


 


Anisocytosis   Present  


 


Creatine Kinase


 


 


 17 U/L


() 





 


Test


 7/3/20


08:00 


 


 





 


O2 Saturation 85 % (92-99)    


 


Arterial Blood pH


 7.36


(7.35-7.45) 


 


 





 


Arterial Blood pCO2 at


Patient Temp 48 mmHg


(35-46) 


 


 





 


Arterial Blood pO2 at Patient


Temp 54 mmHg


() 


 


 





 


Arterial Blood HCO3


 27 mmol/L


(21-28) 


 


 





 


Arterial Blood Base Excess


 1 mmol/L


(-3-3) 


 


 





 


FiO2 40    








Problem List


Problems


Medical Problems:


(1) Acute pancreatitis


Status: Acute  





(2) Cholelithiasis


Status: Acute  








Assessment/Plan


Status post pancreatic debridement


Hemodynamically stable


Increase tube feeds once bowel function returns


Supportive care





Justicifation of Admission Dx:


Justifications for Admission:


Justification of Admission Dx:  Yes











OVIDIO MEDINA MD              Jul 3, 2020 09:59

## 2020-07-03 NOTE — PDOC
PROGRESS NOTES


Chief Complaint


Chief Complaint


 


Acute hypoxic Respiratory failure required  mechanical ventilation


Tracheostomy


bilateral pleural effusions/pulm edema s/p Throacentesis on 6/15/2020


Severe Acute gallstone pancreatitis (not a surgical candidate at this time) with

necrosis


Acute kidney failure now requiring dialysis


Gallstones (Calculus of gallbladder with acute cholecystitis without 

obstruction)


HTN 


Intractable pain


Intractable nausea


Covid 19 negative. 


Acute on chronic anemia 


EEG: No seizure activityFever  - better currently - intermittent could be from 

underlying pancreatitis blood cults 5/4 - neg so far


? Ileus with vomiting


Abd distention - U/S and CT reviewed s/p 0.4 L of opaque, debris-containing 

ascites was removed 5/6


Acute pancreatitis with persistent necrosis


Gallstone pancreatitis with necrosis. 


   -CT A/P 6/6 showed multiple pseudocysts, slight larger on the right. s/p 

drains x 3, 6/7.  + PSAE (MDRO-R Cefepime, Zosyn ANSON < 64) and yeast, 


   -s/p drain 4/27. C. parapsilosis. s/p drain 5/6 + yeast & high amylase; s/p 

additional drain on 5/8. Drains removed. 


Ascites s/p paracentesis 4/15 & 5/6. C. parapsilosis 


JED. off HD. 


A large fluid collection in the pancreatic bed has slightly decreased in size, 

described below, the pancreas itself is difficult to  visualize, which could be 

due to necrosis or obscuration of pancreatic  parenchyma from the surrounding 

fluid collection.6/15 


- 4/27 status post ROBERT drain placement + C paropsilosis. s/p additional drains 

5/8


Anemia - S/p PRBCs


Cholelithiasis with thickening of the gallbladder wall.


Leucocytosis improving


JED, hyperkalemia, Metabolic acidosis off dialysis


hypocalcemia 


Prediabetes


HTN


s/p trach


ESRD on HD


Hyperglycemia


severe protein-caloric malnutrition


Moderate to large left pleural effusion with atelectasis and collapse  of most 

of the left lower lobe, stable


 


Dispo - ICU, critically ill


Poor prognosis





History of Present Illness


History of Present Illness


7/3, no meaningful change


cont current


2 drains still putting out a lot of fluid








 7/2, prongonsis poor,   wean pressors as able





,  s/p surg





Vitals


Vitals





Vital Signs








  Date Time  Temp Pulse Resp B/P (MAP) Pulse Ox O2 Delivery O2 Flow Rate FiO2


 


7/3/20 15:52      Mechanical Ventilator  


 


7/3/20 15:38     98   


 


7/3/20 15:00  86 14 105/57 (73)    


 


7/3/20 11:30 98.2       





 98.2       


 


7/3/20 06:09       40.0 











Physical Exam


Physical Exam


GENERAL: Sedated, ill appearing


HEENT: Pupils equal, oral cavity dry. + NGT


NECK:  Tracheostomy 


LUNGS: Diminished aeration bases,  CT on left 


HEART:  S1, S2, regular w/ PVCs 


ABDOMEN: Mild distention, bowel sounds hypoactive, soft, gildardo x2, 3 ROBERT 

drains, G-J tube and + wound vac 


: [place] 


EXTREMITIES: Generalized edema, no cyanosis. SCDs & Podus boots bilaterally  


SKIN: warm touch. No signs of rash.  


LUE-PICC without signs of complications 


LUE art-line now out, some mottling left forearm. RP palpable, cap refill brisk.


General:  Other (Sedated on the ventilator)


Heart:  Regular rate (SR/ST), Other (distant heart sounds)


Lungs:  Crackles


Abdomen:  Soft, Other (Wound VAC in place drain output has decreased in all 

drains, awaiting return of bowel function prior to increasing tube feeds)


Extremities:  Other (Diffuse edema)


Skin:  No rashes, No significant lesion





Labs


LABS





Laboratory Tests








Test


 7/2/20


18:10 7/3/20


05:30 7/3/20


06:01 7/3/20


08:00


 


Glucose (Fingerstick)


 176 mg/dL


(70-99) 


 150 mg/dL


(70-99) 





 


White Blood Count


 


 29.7 x10^3/uL


(4.0-11.0) 


 





 


Red Blood Count


 


 2.85 x10^6/uL


(3.50-5.40) 


 





 


Hemoglobin


 


 8.3 g/dL


(12.0-15.5) 


 





 


Hematocrit


 


 25.0 %


(36.0-47.0) 


 





 


Mean Corpuscular Volume  88 fL ()   


 


Mean Corpuscular Hemoglobin  29 pg (25-35)   


 


Mean Corpuscular Hemoglobin


Concent 


 33 g/dL


(31-37) 


 





 


Red Cell Distribution Width


 


 15.0 %


(11.5-14.5) 


 





 


Platelet Count


 


 260 x10^3/uL


(140-400) 


 





 


Neutrophils (%) (Auto)  90 % (31-73)   


 


Lymphocytes (%) (Auto)  4 % (24-48)   


 


Monocytes (%) (Auto)  5 % (0-9)   


 


Eosinophils (%) (Auto)  1 % (0-3)   


 


Basophils (%) (Auto)  0 % (0-3)   


 


Neutrophils # (Auto)


 


 26.6 x10^3/uL


(1.8-7.7) 


 





 


Lymphocytes # (Auto)


 


 1.3 x10^3/uL


(1.0-4.8) 


 





 


Monocytes # (Auto)


 


 1.5 x10^3/uL


(0.0-1.1) 


 





 


Eosinophils # (Auto)


 


 0.3 x10^3/uL


(0.0-0.7) 


 





 


Basophils # (Auto)


 


 0.0 x10^3/uL


(0.0-0.2) 


 





 


Segmented Neutrophils %  87 % (35-66)   


 


Band Neutrophils %  7 % (0-9)   


 


Lymphocytes %  4 % (24-48)   


 


Monocytes %  2 % (0-10)   


 


Platelet Estimate


 


 Adequate


(ADEQUATE) 


 





 


Polychromasia  Present   


 


Anisocytosis  Present   


 


Creatine Kinase


 


 17 U/L


() 


 





 


O2 Saturation    85 % (92-99) 


 


Arterial Blood pH


 


 


 


 7.36


(7.35-7.45)


 


Arterial Blood pCO2 at


Patient Temp 


 


 


 48 mmHg


(35-46)


 


Arterial Blood pO2 at Patient


Temp 


 


 


 54 mmHg


()


 


Arterial Blood HCO3


 


 


 


 27 mmol/L


(21-28)


 


Arterial Blood Base Excess


 


 


 


 1 mmol/L


(-3-3)


 


FiO2    40 


 


Test


 7/3/20


10:16 7/3/20


12:26 


 





 


Sodium Level


 133 mmol/L


(136-145) 


 


 





 


Potassium Level


 3.9 mmol/L


(3.5-5.1) 


 


 





 


Chloride Level


 102 mmol/L


() 


 


 





 


Carbon Dioxide Level


 28 mmol/L


(21-32) 


 


 





 


Anion Gap 3 (6-14)    


 


Blood Urea Nitrogen


 20 mg/dL


(7-20) 


 


 





 


Creatinine


 0.6 mg/dL


(0.6-1.0) 


 


 





 


Estimated GFR


(Cockcroft-Gault) 106.3 


 


 


 





 


BUN/Creatinine Ratio 33 (6-20)    


 


Glucose Level


 224 mg/dL


(70-99) 


 


 





 


Calcium Level


 8.6 mg/dL


(8.5-10.1) 


 


 





 


Total Bilirubin


 0.6 mg/dL


(0.2-1.0) 


 


 





 


Aspartate Amino Transf


(AST/SGOT) 42 U/L (15-37) 


 


 


 





 


Alanine Aminotransferase


(ALT/SGPT) 98 U/L (14-59) 


 


 


 





 


Alkaline Phosphatase


 199 U/L


() 


 


 





 


Total Protein


 3.7 g/dL


(6.4-8.2) 


 


 





 


Albumin


 1.0 g/dL


(3.4-5.0) 


 


 





 


Albumin/Globulin Ratio 0.4 (1.0-1.7)    


 


Glucose (Fingerstick)


 


 179 mg/dL


(70-99) 


 














Assessment and Plan


Assessmemt and Plan


Problems


Medical Problems:


(1) Acute pancreatitis


Status: Acute  





(2) Cholelithiasis


Status: Acute  











Comment


Review of Relevant


I have reviewed the following items josy (where applicable) has been applied.


Labs





Laboratory Tests








Test


 7/1/20


16:30 7/1/20


17:49 7/2/20


00:19 7/2/20


06:09


 


Hemoglobin


 7.3 g/dL


(12.0-15.5) 


 


 





 


Hematocrit


 21.4 %


(36.0-47.0) 


 


 





 


Mean Corpuscular Hemoglobin


Concent 34 g/dL


(31-37) 


 


 





 


Glucose (Fingerstick)


 


 201 mg/dL


(70-99) 200 mg/dL


(70-99) 157 mg/dL


(70-99)


 


Test


 7/2/20


06:10 7/2/20


08:00 7/2/20


12:43 7/2/20


18:10


 


White Blood Count


 38.3 x10^3/uL


(4.0-11.0) 


 


 





 


Red Blood Count


 3.06 x10^6/uL


(3.50-5.40) 


 


 





 


Hemoglobin


 9.0 g/dL


(12.0-15.5) 


 


 





 


Hematocrit


 26.8 %


(36.0-47.0) 


 


 





 


Mean Corpuscular Volume 88 fL ()    


 


Mean Corpuscular Hemoglobin 29 pg (25-35)    


 


Mean Corpuscular Hemoglobin


Concent 34 g/dL


(31-37) 


 


 





 


Red Cell Distribution Width


 14.8 %


(11.5-14.5) 


 


 





 


Platelet Count


 278 x10^3/uL


(140-400) 


 


 





 


Sodium Level


 134 mmol/L


(136-145) 


 


 





 


Potassium Level


 4.0 mmol/L


(3.5-5.1) 


 


 





 


Chloride Level


 103 mmol/L


() 


 


 





 


Carbon Dioxide Level


 28 mmol/L


(21-32) 


 


 





 


Anion Gap 3 (6-14)    


 


Blood Urea Nitrogen


 24 mg/dL


(7-20) 


 


 





 


Creatinine


 0.7 mg/dL


(0.6-1.0) 


 


 





 


Estimated GFR


(Cockcroft-Gault) 88.9 


 


 


 





 


Glucose Level


 178 mg/dL


(70-99) 


 


 





 


Calcium Level


 8.9 mg/dL


(8.5-10.1) 


 


 





 


Phosphorus Level


 4.2 mg/dL


(2.6-4.7) 


 


 





 


Magnesium Level


 2.1 mg/dL


(1.8-2.4) 


 


 





 


O2 Saturation  98 % (92-99)   


 


Arterial Blood pH


 


 7.36


(7.35-7.45) 


 





 


Arterial Blood pCO2 at


Patient Temp 


 41 mmHg


(35-46) 


 





 


Arterial Blood pO2 at Patient


Temp 


 127 mmHg


() 


 





 


Arterial Blood HCO3


 


 23 mmol/L


(21-28) 


 





 


Arterial Blood Base Excess


 


 -2 mmol/L


(-3-3) 


 





 


FiO2  40   


 


Glucose (Fingerstick)


 


 


 184 mg/dL


(70-99) 176 mg/dL


(70-99)


 


Test


 7/3/20


05:30 7/3/20


06:01 7/3/20


08:00 7/3/20


10:16


 


White Blood Count


 29.7 x10^3/uL


(4.0-11.0) 


 


 





 


Red Blood Count


 2.85 x10^6/uL


(3.50-5.40) 


 


 





 


Hemoglobin


 8.3 g/dL


(12.0-15.5) 


 


 





 


Hematocrit


 25.0 %


(36.0-47.0) 


 


 





 


Mean Corpuscular Volume 88 fL ()    


 


Mean Corpuscular Hemoglobin 29 pg (25-35)    


 


Mean Corpuscular Hemoglobin


Concent 33 g/dL


(31-37) 


 


 





 


Red Cell Distribution Width


 15.0 %


(11.5-14.5) 


 


 





 


Platelet Count


 260 x10^3/uL


(140-400) 


 


 





 


Neutrophils (%) (Auto) 90 % (31-73)    


 


Lymphocytes (%) (Auto) 4 % (24-48)    


 


Monocytes (%) (Auto) 5 % (0-9)    


 


Eosinophils (%) (Auto) 1 % (0-3)    


 


Basophils (%) (Auto) 0 % (0-3)    


 


Neutrophils # (Auto)


 26.6 x10^3/uL


(1.8-7.7) 


 


 





 


Lymphocytes # (Auto)


 1.3 x10^3/uL


(1.0-4.8) 


 


 





 


Monocytes # (Auto)


 1.5 x10^3/uL


(0.0-1.1) 


 


 





 


Eosinophils # (Auto)


 0.3 x10^3/uL


(0.0-0.7) 


 


 





 


Basophils # (Auto)


 0.0 x10^3/uL


(0.0-0.2) 


 


 





 


Segmented Neutrophils % 87 % (35-66)    


 


Band Neutrophils % 7 % (0-9)    


 


Lymphocytes % 4 % (24-48)    


 


Monocytes % 2 % (0-10)    


 


Platelet Estimate


 Adequate


(ADEQUATE) 


 


 





 


Polychromasia Present    


 


Anisocytosis Present    


 


Creatine Kinase


 17 U/L


() 


 


 





 


Glucose (Fingerstick)


 


 150 mg/dL


(70-99) 


 





 


O2 Saturation   85 % (92-99)  


 


Arterial Blood pH


 


 


 7.36


(7.35-7.45) 





 


Arterial Blood pCO2 at


Patient Temp 


 


 48 mmHg


(35-46) 





 


Arterial Blood pO2 at Patient


Temp 


 


 54 mmHg


() 





 


Arterial Blood HCO3


 


 


 27 mmol/L


(21-28) 





 


Arterial Blood Base Excess


 


 


 1 mmol/L


(-3-3) 





 


FiO2   40  


 


Sodium Level


 


 


 


 133 mmol/L


(136-145)


 


Potassium Level


 


 


 


 3.9 mmol/L


(3.5-5.1)


 


Chloride Level


 


 


 


 102 mmol/L


()


 


Carbon Dioxide Level


 


 


 


 28 mmol/L


(21-32)


 


Anion Gap    3 (6-14) 


 


Blood Urea Nitrogen


 


 


 


 20 mg/dL


(7-20)


 


Creatinine


 


 


 


 0.6 mg/dL


(0.6-1.0)


 


Estimated GFR


(Cockcroft-Gault) 


 


 


 106.3 





 


BUN/Creatinine Ratio    33 (6-20) 


 


Glucose Level


 


 


 


 224 mg/dL


(70-99)


 


Calcium Level


 


 


 


 8.6 mg/dL


(8.5-10.1)


 


Total Bilirubin


 


 


 


 0.6 mg/dL


(0.2-1.0)


 


Aspartate Amino Transf


(AST/SGOT) 


 


 


 42 U/L (15-37) 





 


Alanine Aminotransferase


(ALT/SGPT) 


 


 


 98 U/L (14-59) 





 


Alkaline Phosphatase


 


 


 


 199 U/L


()


 


Total Protein


 


 


 


 3.7 g/dL


(6.4-8.2)


 


Albumin


 


 


 


 1.0 g/dL


(3.4-5.0)


 


Albumin/Globulin Ratio    0.4 (1.0-1.7) 


 


Test


 7/3/20


12:26 


 


 





 


Glucose (Fingerstick)


 179 mg/dL


(70-99) 


 


 











Laboratory Tests








Test


 7/2/20


18:10 7/3/20


05:30 7/3/20


06:01 7/3/20


08:00


 


Glucose (Fingerstick)


 176 mg/dL


(70-99) 


 150 mg/dL


(70-99) 





 


White Blood Count


 


 29.7 x10^3/uL


(4.0-11.0) 


 





 


Red Blood Count


 


 2.85 x10^6/uL


(3.50-5.40) 


 





 


Hemoglobin


 


 8.3 g/dL


(12.0-15.5) 


 





 


Hematocrit


 


 25.0 %


(36.0-47.0) 


 





 


Mean Corpuscular Volume  88 fL ()   


 


Mean Corpuscular Hemoglobin  29 pg (25-35)   


 


Mean Corpuscular Hemoglobin


Concent 


 33 g/dL


(31-37) 


 





 


Red Cell Distribution Width


 


 15.0 %


(11.5-14.5) 


 





 


Platelet Count


 


 260 x10^3/uL


(140-400) 


 





 


Neutrophils (%) (Auto)  90 % (31-73)   


 


Lymphocytes (%) (Auto)  4 % (24-48)   


 


Monocytes (%) (Auto)  5 % (0-9)   


 


Eosinophils (%) (Auto)  1 % (0-3)   


 


Basophils (%) (Auto)  0 % (0-3)   


 


Neutrophils # (Auto)


 


 26.6 x10^3/uL


(1.8-7.7) 


 





 


Lymphocytes # (Auto)


 


 1.3 x10^3/uL


(1.0-4.8) 


 





 


Monocytes # (Auto)


 


 1.5 x10^3/uL


(0.0-1.1) 


 





 


Eosinophils # (Auto)


 


 0.3 x10^3/uL


(0.0-0.7) 


 





 


Basophils # (Auto)


 


 0.0 x10^3/uL


(0.0-0.2) 


 





 


Segmented Neutrophils %  87 % (35-66)   


 


Band Neutrophils %  7 % (0-9)   


 


Lymphocytes %  4 % (24-48)   


 


Monocytes %  2 % (0-10)   


 


Platelet Estimate


 


 Adequate


(ADEQUATE) 


 





 


Polychromasia  Present   


 


Anisocytosis  Present   


 


Creatine Kinase


 


 17 U/L


() 


 





 


O2 Saturation    85 % (92-99) 


 


Arterial Blood pH


 


 


 


 7.36


(7.35-7.45)


 


Arterial Blood pCO2 at


Patient Temp 


 


 


 48 mmHg


(35-46)


 


Arterial Blood pO2 at Patient


Temp 


 


 


 54 mmHg


()


 


Arterial Blood HCO3


 


 


 


 27 mmol/L


(21-28)


 


Arterial Blood Base Excess


 


 


 


 1 mmol/L


(-3-3)


 


FiO2    40 


 


Test


 7/3/20


10:16 7/3/20


12:26 


 





 


Sodium Level


 133 mmol/L


(136-145) 


 


 





 


Potassium Level


 3.9 mmol/L


(3.5-5.1) 


 


 





 


Chloride Level


 102 mmol/L


() 


 


 





 


Carbon Dioxide Level


 28 mmol/L


(21-32) 


 


 





 


Anion Gap 3 (6-14)    


 


Blood Urea Nitrogen


 20 mg/dL


(7-20) 


 


 





 


Creatinine


 0.6 mg/dL


(0.6-1.0) 


 


 





 


Estimated GFR


(Cockcroft-Gault) 106.3 


 


 


 





 


BUN/Creatinine Ratio 33 (6-20)    


 


Glucose Level


 224 mg/dL


(70-99) 


 


 





 


Calcium Level


 8.6 mg/dL


(8.5-10.1) 


 


 





 


Total Bilirubin


 0.6 mg/dL


(0.2-1.0) 


 


 





 


Aspartate Amino Transf


(AST/SGOT) 42 U/L (15-37) 


 


 


 





 


Alanine Aminotransferase


(ALT/SGPT) 98 U/L (14-59) 


 


 


 





 


Alkaline Phosphatase


 199 U/L


() 


 


 





 


Total Protein


 3.7 g/dL


(6.4-8.2) 


 


 





 


Albumin


 1.0 g/dL


(3.4-5.0) 


 


 





 


Albumin/Globulin Ratio 0.4 (1.0-1.7)    


 


Glucose (Fingerstick)


 


 179 mg/dL


(70-99) 


 











Microbiology


6/30/20 Gram Stain - Final, Resulted


          


6/30/20 Aerobic and Anaerobic Culture - Preliminary, Resulted


          


6/28/20 Blood Culture - Preliminary, Resulted


          NO GROWTH AFTER 4 DAYS


6/15/20 Gram Stain - Final, Complete


          


6/15/20 Aerobic and Anaerobic Culture - Final, Complete


          


6/13/20 Gram Stain Evaluation - Final, Complete


          


6/13/20 Respiratory Culture - Final, Complete


          


6/13/20 Antimicrobic Susceptibility - Final, Complete


          


6/7/20 Urine Culture - Final, Complete


         


5/30/20 Gram Stain - Final, Complete


          


5/30/20 Aerobic Culture - Final, Complete


Medications





Current Medications


Sodium Chloride 1,000 ml @  1,000 mls/hr Q1H IV  Last administered on 3/16/20at 

03:00;  Start 3/16/20 at 03:00;  Stop 3/16/20 at 03:59;  Status DC


Ondansetron HCl (Zofran) 4 mg 1X  ONCE IVP  Last administered on 3/16/20at 

03:27;  Start 3/16/20 at 03:00;  Stop 3/16/20 at 03:01;  Status DC


Morphine Sulfate (Morphine Sulfate) 4 mg 1X  ONCE IV ;  Start 3/16/20 at 03:00; 

Stop 3/16/20 at 03:01;  Status Cancel


Ketorolac Tromethamine (Toradol 30mg Vial) 30 mg 1X  ONCE IV  Last administered 

on 3/16/20at 02:54;  Start 3/16/20 at 03:00;  Stop 3/16/20 at 03:01;  Status DC


Fentanyl Citrate (Fentanyl 2ml Vial) 25 mcg 1X  ONCE IVP  Last administered on 

3/16/20at 03:23;  Start 3/16/20 at 03:30;  Stop 3/16/20 at 03:31;  Status DC


Fentanyl Citrate (Fentanyl 2ml Vial) 100 mcg STK-MED ONCE .ROUTE ;  Start 

3/16/20 at 03:18;  Stop 3/16/20 at 03:18;  Status DC


Iohexol (Omnipaque 350 Mg/ml) 90 ml 1X  ONCE IV  Last administered on 3/16/20at 

03:25;  Start 3/16/20 at 03:30;  Stop 3/16/20 at 03:31;  Status DC


Info (CONTRAST GIVEN -- Rx MONITORING) 1 each PRN DAILY  PRN MC SEE COMMENTS;  

Start 3/16/20 at 03:30;  Stop 3/18/20 at 03:29;  Status DC


Hydromorphone HCl (Dilaudid) 0.5 mg 1X  ONCE IV  Last administered on 3/16/20at 

03:55;  Start 3/16/20 at 04:30;  Stop 3/16/20 at 04:32;  Status DC


Ondansetron HCl (Zofran) 4 mg PRN Q8HRS  PRN IV NAUSEA/VOMITING 1ST CHOICE;  

Start 3/16/20 at 05:00;  Stop 3/16/20 at 09:27;  Status DC


Morphine Sulfate (Morphine Sulfate) 2 mg PRN Q2HR  PRN IV SEVERE PAIN 7-10 Last 

administered on 3/17/20at 12:26;  Start 3/16/20 at 05:00;  Stop 3/17/20 at 

14:15;  Status DC


Sodium Chloride 1,000 ml @  125 mls/hr Q8H IV  Last administered on 3/16/20at 

20:56;  Start 3/16/20 at 05:00;  Stop 3/17/20 at 04:59;  Status DC


Hydromorphone HCl (Dilaudid) 0.5 mg PRN Q3HRS  PRN IV SEVERE PAIN 7-10 Last 

administered on 3/17/20at 10:06;  Start 3/16/20 at 05:00;  Stop 3/17/20 at 

12:01;  Status DC


Piperacillin Sod/ Tazobactam Sod 4.5 gm/Sodium Chloride 100 ml @  200 mls/hr 1X 

ONCE IV  Last administered on 3/16/20at 05:44;  Start 3/16/20 at 06:00;  Stop 

3/16/20 at 06:29;  Status DC


Ondansetron HCl (Zofran) 4 mg PRN Q4HRS  PRN IV NAUSEA/VOMITING 1ST CHOICE Last 

administered on 6/27/20at 13:37;  Start 3/16/20 at 09:30


Insulin Human Lispro (HumaLOG) 0-9 UNITS Q6HRS SQ  Last administered on 7/3/20at

12:30;  Start 3/16/20 at 09:30


Dextrose (Dextrose 50%-Water Syringe) 12.5 gm PRN Q15MIN  PRN IV SEE COMMENTS;  

Start 3/16/20 at 09:30


Pantoprazole Sodium (PROTONIX VIAL for IV PUSH) 40 mg DAILYAC IVP  Last 

administered on 7/3/20at 07:56;  Start 3/16/20 at 11:30


Prochlorperazine Edisylate (Compazine) 10 mg PRN Q6HRS  PRN IV NAUSEA/VOMITING, 

2nd CHOICE Last administered on 6/27/20at 10:53;  Start 3/16/20 at 17:45


Atenolol (Tenormin) 100 mg DAILY PO ;  Start 3/17/20 at 09:00;  Stop 3/16/20 at 

20:08;  Status DC


Metoprolol Tartrate (Lopressor Vial) 2.5 mg Q6HRS IVP  Last administered on 

3/17/20at 05:51;  Start 3/16/20 at 20:15;  Stop 3/17/20 at 10:02;  Status DC


Metoprolol Tartrate (Lopressor Vial) 5 mg Q6HRS IVP  Last administered on 

3/26/20at 00:12;  Start 3/17/20 at 10:15;  Stop 3/28/20 at 08:48;  Status DC


Hydromorphone HCl (Dilaudid) 1 mg PRN Q3HRS  PRN IV SEVERE PAIN 7-10 Last 

administered on 3/23/20at 05:13;  Start 3/17/20 at 12:00;  Stop 3/31/20 at 

00:25;  Status DC


Lidocaine HCl (Buffered Lidocaine 1%) 3 ml STK-MED ONCE .ROUTE ;  Start 3/17/20 

at 12:55;  Stop 3/17/20 at 12:56;  Status DC


Albumin Human 500 ml @  125 mls/hr 1X  ONCE IV  Last administered on 3/17/20at 

14:33;  Start 3/17/20 at 14:30;  Stop 3/17/20 at 18:32;  Status DC


Norepinephrine Bitartrate 8 mg/ Dextrose 258 ml @  17.299 mls/ hr CONT  PRN IV 

PER PROTOCOL Last administered on 4/14/20at 12:48;  Start 3/17/20 at 15:30;  

Stop 4/17/20 at 09:19;  Status DC


Sodium Chloride 1,000 ml @  125 mls/hr Q8H IV  Last administered on 3/17/20at 

21:04;  Start 3/17/20 at 16:00;  Stop 3/18/20 at 02:42;  Status DC


Albumin Human 500 ml @  125 mls/hr PRN BID  PRN IV After every 2L NSS & BP < 

90mm Last administered on 6/30/20at 16:06;  Start 3/17/20 at 16:00;  Stop 7/3/20

at 09:30;  Status DC


Iohexol (Omnipaque 300 Mg/ml) 60 ml 1X  ONCE IV  Last administered on 3/17/20at 

17:20;  Start 3/17/20 at 17:00;  Stop 3/17/20 at 17:01;  Status DC


Info (CONTRAST GIVEN -- Rx MONITORING) 1 each PRN DAILY  PRN MC SEE COMMENTS;  

Start 3/17/20 at 17:00;  Stop 3/19/20 at 16:59;  Status DC


Meropenem 1 gm/ Sodium Chloride 100 ml @  200 mls/hr Q8HRS IV  Last administered

on 3/18/20at 05:45;  Start 3/17/20 at 20:00;  Stop 3/18/20 at 08:48;  Status DC


Furosemide (Lasix) 40 mg 1X  ONCE IVP  Last administered on 3/17/20at 22:12;  

Start 3/17/20 at 22:30;  Stop 3/17/20 at 22:31;  Status DC


Calcium Chloride 1000 mg/Sodium Chloride 110 ml @  220 mls/hr 1X  ONCE IV  Last 

administered on 3/17/20at 22:11;  Start 3/17/20 at 22:30;  Stop 3/17/20 at 

22:59;  Status DC


Albuterol Sulfate (Ventolin Neb Soln) 2.5 mg 1X  ONCE NEB  Last administered on 

3/18/20at 00:56;  Start 3/17/20 at 22:30;  Stop 3/17/20 at 22:31;  Status DC


Insulin Human Regular (HumuLIN R VIAL) 5 unit 1X  ONCE IV  Last administered on 

3/17/20at 22:14;  Start 3/17/20 at 22:30;  Stop 3/17/20 at 22:31;  Status DC


Magnesium Sulfate 50 ml @ 25 mls/hr 1X  ONCE IV  Last administered on 3/18/20at 

02:57;  Start 3/18/20 at 03:00;  Stop 3/18/20 at 04:59;  Status DC


Calcium Gluconate 1000 mg/Sodium Chloride 110 ml @  220 mls/hr 1X  ONCE IV  Last

administered on 3/18/20at 02:46;  Start 3/18/20 at 03:00;  Stop 3/18/20 at 

03:29;  Status DC


Sodium Chloride 1,000 ml @  200 mls/hr Q5H IV  Last administered on 3/18/20at 

02:46;  Start 3/18/20 at 03:00;  Stop 3/18/20 at 10:21;  Status DC


Calcium Gluconate 1000 mg/Sodium Chloride 110 ml @  220 mls/hr 1X  ONCE IV  Last

administered on 3/18/20at 03:21;  Start 3/18/20 at 03:30;  Stop 3/18/20 at 

03:59;  Status DC


Sodium Bicarbonate 50 meq/Sodium Chloride 1,050 ml @  75 mls/hr Q14H IV  Last 

administered on 3/22/20at 21:10;  Start 3/18/20 at 07:30;  Stop 3/23/20 at 

10:28;  Status DC


Calcium Gluconate 2000 mg/Sodium Chloride 120 ml @  220 mls/hr 1X  ONCE IV  Last

administered on 3/18/20at 09:05;  Start 3/18/20 at 07:30;  Stop 3/18/20 at 

08:02;  Status DC


Lidocaine HCl (Xylocaine-Mpf 1% 2ml Vial) 2 ml STK-MED ONCE .ROUTE ;  Start 

3/18/20 at 08:47;  Stop 3/18/20 at 08:47;  Status DC


Meropenem 500 mg/ Sodium Chloride 50 ml @  100 mls/hr Q12HR IV  Last 

administered on 3/23/20at 21:01;  Start 3/18/20 at 18:00;  Stop 3/24/20 at 

07:58;  Status DC


Lidocaine HCl (Buffered Lidocaine 1%) 3 ml STK-MED ONCE .ROUTE ;  Start 3/18/20 

at 09:46;  Stop 3/18/20 at 09:46;  Status DC


Lidocaine HCl (Buffered Lidocaine 1%) 6 ml 1X  ONCE INJ  Last administered on 

3/18/20at 10:26;  Start 3/18/20 at 10:15;  Stop 3/18/20 at 10:16;  Status DC


Info (Tpn Per Pharmacy) 1 each PRN DAILY  PRN MC SEE COMMENTS Last administered 

on 7/3/20at 11:45;  Start 3/18/20 at 12:00


Sodium Chloride 1,000 ml @  1,000 mls/hr Q1H PRN IV hypotension;  Start 3/18/20 

at 12:07;  Stop 3/18/20 at 18:06;  Status DC


Diphenhydramine HCl (Benadryl) 25 mg 1X PRN  PRN IV ITCHING;  Start 3/18/20 at 

12:15;  Stop 3/19/20 at 12:14;  Status DC


Diphenhydramine HCl (Benadryl) 25 mg 1X PRN  PRN IV ITCHING;  Start 3/18/20 at 

12:15;  Stop 3/19/20 at 12:14;  Status DC


Sodium Chloride 1,000 ml @  400 mls/hr Q2H30M PRN IV PATENCY;  Start 3/18/20 at 

12:07;  Stop 3/19/20 at 00:06;  Status DC


Info (PHARMACY MONITORING -- do not chart) 1 each PRN DAILY  PRN MC SEE 

COMMENTS;  Start 3/18/20 at 12:15;  Stop 3/20/20 at 08:13;  Status DC


Sodium Chloride 90 meq/Calcium Gluconate 10 meq/ Multivitamins 10 ml/Chromium/ 

Copper/Manganese/ Seleni/Zn 1 ml/ Total Parenteral Nutrition/Amino 

Acids/Dextrose/ Fat Emulsion Intravenous 55.005 ml  @ 2.292 mls/hr TPN  CONT IV 

;  Start 3/18/20 at 22:00;  Stop 3/18/20 at 12:33;  Status DC


Info (Tpn Per Pharmacy) 1 each PRN DAILY  PRN MC SEE COMMENTS;  Start 3/18/20 at

12:30;  Status UNV


Sodium Chloride 90 meq/Calcium Gluconate 10 meq/ Multivitamins 10 ml/Chromium/ C

opper/Manganese/ Seleni/Zn 0.5 ml/ Total Parenteral Nutrition/Amino 

Acids/Dextrose/ Fat Emulsion Intravenous 1,512 ml @  63 mls/hr TPN  CONT IV  

Last administered on 3/18/20at 22:06;  Start 3/18/20 at 22:00;  Stop 3/19/20 at 

21:59;  Status DC


Calcium Carbonate/ Glycine (Tums) 500 mg PRN AFTMEALHC  PRN PO INDIGESTION;  

Start 3/18/20 at 17:45;  Stop 5/13/20 at 10:25;  Status DC


Calcium Gluconate (Calcium Gluconate) 2,000 mg 1X  ONCE IVP  Last administered 

on 3/19/20at 02:19;  Start 3/19/20 at 02:15;  Stop 3/19/20 at 02:16;  Status DC


Calcium Chloride 3000 mg/Sodium Chloride 1,030 ml @  50 mls/hr [id number] IV  Last 

administered on 3/21/20at 02:17;  Start 3/19/20 at 08:00;  Stop 3/21/20 at 

15:23;  Status DC


Lorazepam (Ativan Inj) 1 mg PRN Q4HRS  PRN IVP ANXIETY / AGITATION, 2nd choic 

Last administered on 4/17/20at 03:51;  Start 3/19/20 at 09:00;  Stop 4/17/20 at 

09:19;  Status DC


Sodium Chloride 1,000 ml @  1,000 mls/hr Q1H PRN IV hypotension;  Start 3/19/20 

at 08:56;  Stop 3/19/20 at 14:55;  Status DC


Albumin Human 200 ml @  200 mls/hr 1X PRN  PRN IV Hypotension;  Start 3/19/20 at

09:00;  Stop 3/19/20 at 14:59;  Status DC


Diphenhydramine HCl (Benadryl) 25 mg 1X PRN  PRN IV ITCHING;  Start 3/19/20 at 

09:00;  Stop 3/20/20 at 08:59;  Status DC


Diphenhydramine HCl (Benadryl) 25 mg 1X PRN  PRN IV ITCHING;  Start 3/19/20 at 

09:00;  Stop 3/20/20 at 08:59;  Status DC


Sodium Chloride 1,000 ml @  400 mls/hr Q2H30M PRN IV PATENCY;  Start 3/19/20 at 

08:56;  Stop 3/19/20 at 20:55;  Status DC


Info (PHARMACY MONITORING -- do not chart) 1 each PRN DAILY  PRN MC SEE 

COMMENTS;  Start 3/19/20 at 09:00;  Status UNV


Info (PHARMACY MONITORING -- do not chart) 1 each PRN DAILY  PRN MC SEE 

COMMENTS;  Start 3/19/20 at 09:00;  Stop 3/20/20 at 08:13;  Status DC


Digoxin (Lanoxin) 500 mcg 1X  ONCE IV  Last administered on 3/19/20at 10:04;  

Start 3/19/20 at 10:00;  Stop 3/19/20 at 10:01;  Status DC


Digoxin (Lanoxin) 125 mcg 1X  ONCE IV  Last administered on 3/19/20at 17:10;  

Start 3/19/20 at 18:00;  Stop 3/19/20 at 18:01;  Status DC


Magnesium Sulfate 100 ml @  25 mls/hr 1X  ONCE IV  Last administered on 

3/19/20at 12:48;  Start 3/19/20 at 13:00;  Stop 3/19/20 at 16:59;  Status DC


Sodium Chloride 90 meq/Magnesium Sulfate 10 meq/ Calcium Gluconate 20 meq/ 

Multivitamins 10 ml/Chromium/ Copper/Manganese/ Seleni/Zn 0.5 ml/ Total 

Parenteral Nutrition/Amino Acids/Dextrose/ Fat Emulsion Intravenous 1,512 ml @  

63 mls/hr TPN  CONT IV  Last administered on 3/19/20at 22:25;  Start 3/19/20 at 

22:00;  Stop 3/20/20 at 21:59;  Status DC


Sodium Chloride 1,000 ml @  1,000 mls/hr Q1H PRN IV hypotension;  Start 3/20/20 

at 08:05;  Stop 3/20/20 at 14:04;  Status DC


Albumin Human 200 ml @  200 mls/hr 1X  ONCE IV  Last administered on 3/20/20at 

08:57;  Start 3/20/20 at 08:15;  Stop 3/20/20 at 09:14;  Status DC


Diphenhydramine HCl (Benadryl) 25 mg 1X PRN  PRN IV ITCHING;  Start 3/20/20 at 

08:15;  Stop 3/21/20 at 08:14;  Status DC


Diphenhydramine HCl (Benadryl) 25 mg 1X PRN  PRN IV ITCHING;  Start 3/20/20 at 

08:15;  Stop 3/21/20 at 08:14;  Status DC


Sodium Chloride 1,000 ml @  400 mls/hr Q2H30M PRN IV PATENCY;  Start 3/20/20 at 

08:05;  Stop 3/20/20 at 20:04;  Status DC


Info (PHARMACY MONITORING -- do not chart) 1 each PRN DAILY  PRN MC SEE 

COMMENTS;  Start 3/20/20 at 08:15;  Stop 3/24/20 at 07:57;  Status DC


Sodium Chloride 90 meq/Potassium Chloride 15 meq/ Potassium Phosphate 10 mmol/ 

Magnesium Sulfate 10 meq/Calcium Gluconate 20 meq/ Multivitamins 10 ml/Chromium/

Copper/Manganese/ Seleni/Zn 0.5 ml/ Total Parenteral Nutrition/Amino 

Acids/Dextrose/ Fat Emulsion Intravenous 1,512 ml @  63 mls/hr TPN  CONT IV  

Last administered on 3/20/20at 21:01;  Start 3/20/20 at 22:00;  Stop 3/21/20 at 

21:59;  Status DC


Potassium Chloride/Water 100 ml @  100 mls/hr 1X  ONCE IV  Last administered on 

3/20/20at 14:09;  Start 3/20/20 at 14:00;  Stop 3/20/20 at 14:59;  Status DC


Benzocaine (Hurricaine One) 1 spray 1X  ONCE MM  Last administered on 3/20/20at 

16:38;  Start 3/20/20 at 14:30;  Stop 3/20/20 at 14:31;  Status DC


Lidocaine HCl (Glydo (Lidocaine) Jelly) 1 thomas 1X  ONCE MM  Last administered on 

3/20/20at 16:38;  Start 3/20/20 at 14:30;  Stop 3/20/20 at 14:31;  Status DC


Linezolid/Dextrose 300 ml @  300 mls/hr Q12HR IV  Last administered on 3/26/20at

21:04;  Start 3/20/20 at 20:00;  Stop 3/27/20 at 07:50;  Status DC


Acetaminophen (Tylenol) 650 mg PRN Q6HRS  PRN PO MILD PAIN / TEMP;  Start 

3/21/20 at 03:30;  Stop 3/21/20 at 03:36;  Status DC


Acetaminophen (Tylenol) 650 mg PRN Q6HRS  PRN PEG MILD PAIN / TEMP Last 

administered on 4/16/20at 19:56;  Start 3/21/20 at 03:36;  Stop 5/13/20 at 

10:25;  Status DC


Sodium Chloride 1,000 ml @  1,000 mls/hr Q1H PRN IV hypotension;  Start 3/21/20 

at 07:50;  Stop 3/21/20 at 13:49;  Status DC


Albumin Human 200 ml @  200 mls/hr 1X PRN  PRN IV Hypotension;  Start 3/21/20 at

08:00;  Stop 3/21/20 at 13:59;  Status DC


Sodium Chloride (Normal Saline Flush) 10 ml 1X PRN  PRN IV AP catheter pack;  

Start 3/21/20 at 08:00;  Stop 3/22/20 at 07:59;  Status DC


Sodium Chloride (Normal Saline Flush) 10 ml 1X PRN  PRN IV  catheter pack;  

Start 3/21/20 at 08:00;  Stop 3/22/20 at 07:59;  Status DC


Sodium Chloride 1,000 ml @  400 mls/hr Q2H30M PRN IV PATENCY;  Start 3/21/20 at 

07:50;  Stop 3/21/20 at 19:49;  Status DC


Info (PHARMACY MONITORING -- do not chart) 1 each PRN DAILY  PRN MC SEE 

COMMENTS;  Start 3/21/20 at 08:00;  Status UNV


Info (PHARMACY MONITORING -- do not chart) 1 each PRN DAILY  PRN MC SEE 

COMMENTS;  Start 3/21/20 at 08:00;  Stop 3/23/20 at 08:25;  Status DC


Sodium Chloride 90 meq/Potassium Chloride 15 meq/ Potassium Phosphate 10 mmol/ 

Magnesium Sulfate 10 meq/Calcium Gluconate 20 meq/ Multivitamins 10 ml/Chromium/

Copper/Manganese/ Seleni/Zn 0.5 ml/ Total Parenteral Nutrition/Amino 

Acids/Dextrose/ Fat Emulsion Intravenous 1,512 ml @  63 mls/hr TPN  CONT IV  

Last administered on 3/21/20at 20:57;  Start 3/21/20 at 22:00;  Stop 3/22/20 at 

21:59;  Status DC


Sodium Chloride 90 meq/Potassium Chloride 15 meq/ Potassium Phosphate 15 mmol/ 

Magnesium Sulfate 10 meq/Calcium Gluconate 20 meq/ Multivitamins 10 ml/Chromium/

Copper/Manganese/ Seleni/Zn 0.5 ml/ Total Parenteral Nutrition/Amino 

Acids/Dextrose/ Fat Emulsion Intravenous 1,512 ml @  63 mls/hr TPN  CONT IV ;  

Start 3/22/20 at 22:00;  Stop 3/22/20 at 14:16;  Status DC


Sodium Chloride 90 meq/Potassium Chloride 15 meq/ Potassium Phosphate 15 mmol/ 

Magnesium Sulfate 10 meq/Calcium Gluconate 20 meq/ Multivitamins 10 ml/Chromium/

Copper/Manganese/ Seleni/Zn 0.5 ml/ Total Parenteral Nutrition/Amino Acids/Dex

trose/ Fat Emulsion Intravenous 1,200 ml @  50 mls/hr TPN  CONT IV ;  Start 

3/22/20 at 22:00;  Stop 3/22/20 at 14:17;  Status DC


Sodium Chloride 90 meq/Potassium Chloride 15 meq/ Potassium Phosphate 10 mmol/ 

Magnesium Sulfate 10 meq/Calcium Gluconate 20 meq/ Multivitamins 10 ml/Chromium/

Copper/Manganese/ Seleni/Zn 0.5 ml/ Total Parenteral Nutrition/Amino 

Acids/Dextrose/ Fat Emulsion Intravenous 1,200 ml @  50 mls/hr TPN  CONT IV  

Last administered on 3/22/20at 23:29;  Start 3/22/20 at 22:00;  Stop 3/23/20 at 

21:59;  Status DC


Sodium Chloride 1,000 ml @  1,000 mls/hr Q1H PRN IV hypotension;  Start 3/23/20 

at 07:28;  Stop 3/23/20 at 13:27;  Status DC


Albumin Human 200 ml @  200 mls/hr 1X  ONCE IV  Last administered on 3/23/20at 

08:51;  Start 3/23/20 at 07:30;  Stop 3/23/20 at 08:29;  Status DC


Diphenhydramine HCl (Benadryl) 25 mg 1X PRN  PRN IV ITCHING;  Start 3/23/20 at 

07:30;  Stop 3/24/20 at 07:29;  Status DC


Diphenhydramine HCl (Benadryl) 25 mg 1X PRN  PRN IV ITCHING;  Start 3/23/20 at 

07:30;  Stop 3/24/20 at 07:29;  Status DC


Sodium Chloride 1,000 ml @  400 mls/hr Q2H30M PRN IV PATENCY;  Start 3/23/20 at 

07:28;  Stop 3/23/20 at 19:27;  Status DC


Info (PHARMACY MONITORING -- do not chart) 1 each PRN DAILY  PRN MC SEE 

COMMENTS;  Start 3/23/20 at 07:30;  Stop 4/3/20 at 13:01;  Status DC


Metronidazole 100 ml @  100 mls/hr Q6HRS IV  Last administered on 4/8/20at 

06:26;  Start 3/23/20 at 08:30;  Stop 4/8/20 at 09:58;  Status DC


Micafungin Sodium 100 mg/Dextrose 100 ml @  100 mls/hr Q24H IV  Last 

administered on 4/30/20at 08:18;  Start 3/23/20 at 09:00;  Stop 4/30/20 at 

20:58;  Status DC


Propofol 0 ml @ As Directed STK-MED ONCE IV ;  Start 3/23/20 at 07:53;  Stop 

3/23/20 at 07:53;  Status DC


Etomidate (Amidate) 20 mg STK-MED ONCE IV ;  Start 3/23/20 at 07:53;  Stop 

3/23/20 at 07:54;  Status DC


Midazolam HCl (Versed) 5 mg STK-MED ONCE .ROUTE ;  Start 3/23/20 at 07:57;  Stop

3/23/20 at 07:57;  Status DC


Fentanyl Citrate 30 ml @ 0 mls/hr CONT  PRN IV SEE PROTOCOL Last administered on

4/17/20at 06:12;  Start 3/23/20 at 08:15;  Stop 4/17/20 at 09:19;  Status DC


Artificial Tears (Artificial Tears) 1 drop PRN Q1HR  PRN OU DRY EYE, 1st choice;

 Start 3/23/20 at 08:15;  Stop 4/29/20 at 05:31;  Status DC


Midazolam HCl 50 mg/Sodium Chloride 50 ml @ 0 mls/hr CONT  PRN IV SEE PROTOCOL 

Last administered on 3/26/20at 22:39;  Start 3/23/20 at 08:15;  Stop 3/28/20 at 

15:59;  Status DC


Etomidate (Amidate) 8 mg 1X  ONCE IV  Last administered on 3/23/20at 08:33;  

Start 3/23/20 at 08:30;  Stop 3/23/20 at 08:31;  Status DC


Succinylcholine Chloride (Anectine) 120 mg 1X  ONCE IV  Last administered on 

3/23/20at 08:34;  Start 3/23/20 at 08:30;  Stop 3/23/20 at 08:31;  Status DC


Midazolam HCl (Versed) 5 mg 1X  ONCE IV ;  Start 3/23/20 at 08:30;  Stop 3/23/20

at 08:31;  Status DC


Potassium Chloride 15 meq/ Bicarbonate Dialysis Soln w/ out KCl 5,007.5 ml  @ 

1,000 mls/ hr Q5H1M IV  Last administered on 3/24/20at 11:11;  Start 3/23/20 at 

12:00;  Stop 3/24/20 at 11:15;  Status DC


Potassium Chloride 15 meq/ Bicarbonate Dialysis Soln w/ out KCl 5,007.5 ml  @ 

1,000 mls/ hr Q5H1M IV  Last administered on 3/24/20at 11:12;  Start 3/23/20 at 

12:00;  Stop 3/24/20 at 11:17;  Status DC


Potassium Chloride 15 meq/ Bicarbonate Dialysis Soln w/ out KCl 5,007.5 ml  @ 

1,000 mls/ hr Q5H1M IV  Last administered on 3/24/20at 11:11;  Start 3/23/20 at 

12:00;  Stop 3/24/20 at 11:19;  Status DC


Sodium Chloride 90 meq/Potassium Chloride 15 meq/ Potassium Phosphate 10 mmol/ 

Magnesium Sulfate 10 meq/Calcium Gluconate 20 meq/ Multivitamins 10 ml/Chromium/

Copper/Manganese/ Seleni/Zn 0.5 ml/ Total Parenteral Nutrition/Amino 

Acids/Dextrose/ Fat Emulsion Intravenous 1,400 ml @  58.333 mls/ hr TPN  CONT IV

 Last administered on 3/23/20at 21:42;  Start 3/23/20 at 22:00;  Stop 3/24/20 at

21:59;  Status DC


Heparin Sodium (Porcine) (Heparin Sodium) 5,000 unit Q8HRS SQ  Last administered

on 3/28/20at 05:55;  Start 3/23/20 at 15:00;  Stop 3/28/20 at 13:28;  Status DC


Meropenem 500 mg/ Sodium Chloride 50 ml @  100 mls/hr Q6HRS IV  Last 

administered on 3/25/20at 06:00;  Start 3/24/20 at 09:00;  Stop 3/25/20 at 

07:29;  Status DC


Potassium Phosphate 20 mmol/ Sodium Chloride 106.6667 ml @  51.667 m... 1X  ONCE

IV  Last administered on 3/24/20at 11:22;  Start 3/24/20 at 10:15;  Stop 3/24/20

at 12:18;  Status DC


Acetaminophen (Tylenol Supp) 650 mg PRN Q6HRS  PRN CA MILD PAIN / TEMP > 100.3'F

Last administered on 6/29/20at 18:16;  Start 3/24/20 at 10:30


Potassium Chloride/Water 100 ml @  100 mls/hr Q1H IV  Last administered on 

3/24/20at 12:12;  Start 3/24/20 at 11:00;  Stop 3/24/20 at 12:59;  Status DC


Potassium Chloride 20 meq/ Bicarbonate Dialysis Soln w/ out KCl 5,010 ml @  

1,000 mls/hr Q5H1M IV  Last administered on 3/25/20at 08:48;  Start 3/24/20 at 

12:00;  Stop 3/25/20 at 13:03;  Status DC


Potassium Chloride 20 meq/ Bicarbonate Dialysis Soln w/ out KCl 5,010 ml @  

1,000 mls/hr Q5H1M IV  Last administered on 3/29/20at 14:52;  Start 3/24/20 at 

11:30;  Stop 3/29/20 at 19:59;  Status DC


Potassium Chloride 20 meq/ Bicarbonate Dialysis Soln w/ out KCl 5,010 ml @  

1,000 mls/hr Q5H1M IV  Last administered on 3/29/20at 14:53;  Start 3/24/20 at 

11:30;  Stop 3/29/20 at 19:59;  Status DC


Sodium Chloride 90 meq/Potassium Chloride 15 meq/ Potassium Phosphate 15 mmol/ 

Magnesium Sulfate 10 meq/Calcium Gluconate 15 meq/ Multivitamins 10 ml/Chromium/

Copper/Manganese/ Seleni/Zn 0.5 ml/ Total Parenteral Nutrition/Amino 

Acids/Dextrose/ Fat Emulsion Intravenous 1,400 ml @  58.333 mls/ hr TPN  CONT IV

 Last administered on 3/24/20at 22:17;  Start 3/24/20 at 22:00;  Stop 3/25/20 at

21:59;  Status DC


Cefepime HCl (Maxipime) 2 gm Q12HR IVP  Last administered on 4/7/20at 20:56;  

Start 3/25/20 at 09:00;  Stop 4/8/20 at 09:58;  Status DC


Daptomycin 500 mg/ Sodium Chloride 50 ml @  100 mls/hr Q48H IV  Last 

administered on 4/10/20at 09:57;  Start 3/25/20 at 08:30;  Stop 4/10/20 at 

10:07;  Status DC


Lidocaine HCl (Buffered Lidocaine 1%) 3 ml 1X  ONCE INJ  Last administered on 

3/25/20at 10:27;  Start 3/25/20 at 10:30;  Stop 3/25/20 at 10:31;  Status DC


Potassium Phosphate 20 mmol/ Sodium Chloride 106.6667 ml @  51.667 m... 1X  ONCE

IV  Last administered on 3/25/20at 12:51;  Start 3/25/20 at 13:00;  Stop 3/25/20

at 15:03;  Status DC


Sodium Chloride 90 meq/Potassium Chloride 15 meq/ Potassium Phosphate 18 mmol/ 

Magnesium Sulfate 8 meq/Calcium Gluconate 15 meq/ Multivitamins 10 ml/Chromium/ 

Copper/Manganese/ Seleni/Zn 0.5 ml/ Total Parenteral Nutrition/Amino 

Acids/Dextrose/ Fat Emulsion Intravenous 1,400 ml @  58.333 mls/ hr TPN  CONT IV

 Last administered on 3/25/20at 22:16;  Start 3/25/20 at 22:00;  Stop 3/26/20 at

21:59;  Status DC


Potassium Chloride 20 meq/ Bicarbonate Dialysis Soln w/ out KCl 5,010 ml @  

1,000 mls/hr Q5H1M IV  Last administered on 3/29/20at 14:54;  Start 3/25/20 at 

16:00;  Stop 3/29/20 at 19:59;  Status DC


Multi-Ingred Cream/Lotion/Oil/ Oint (Artificial Tears Eye Ointment) 1 thomas PRN 

Q1HR  PRN OU DRY EYE, 2nd choice Last administered on 4/13/20at 08:19;  Start 

3/25/20 at 17:30;  Stop 6/3/20 at 14:39;  Status DC


Sodium Chloride 90 meq/Potassium Chloride 15 meq/ Potassium Phosphate 18 mmol/ 

Magnesium Sulfate 8 meq/Calcium Gluconate 15 meq/ Multivitamins 10 ml/Chromium/ 

Copper/Manganese/ Seleni/Zn 0.5 ml/ Total Parenteral Nutrition/Amino Acids/Dex

trose/ Fat Emulsion Intravenous 1,400 ml @  58.333 mls/ hr TPN  CONT IV  Last 

administered on 3/26/20at 22:00;  Start 3/26/20 at 22:00;  Stop 3/27/20 at 

21:59;  Status DC


Albumin Human 500 ml @  125 mls/hr 1X  ONCE IV ;  Start 3/26/20 at 14:15;  Stop 

3/26/20 at 18:14;  Status DC


Sodium Chloride 90 meq/Potassium Chloride 15 meq/ Potassium Phosphate 18 mmol/ 

Magnesium Sulfate 8 meq/Calcium Gluconate 15 meq/ Multivitamins 10 ml/Chromium/ 

Copper/Manganese/ Seleni/Zn 0.5 ml/ Insulin Human Regular 10 unit/ Total 

Parenteral Nutrition/Amino Acids/Dextrose/ Fat Emulsion Intravenous 1,400 ml @  

58.333 mls/ hr TPN  CONT IV  Last administered on 3/27/20at 21:43;  Start 

3/27/20 at 22:00;  Stop 3/28/20 at 21:59;  Status DC


Lidocaine HCl (Buffered Lidocaine 1%) 3 ml STK-MED ONCE .ROUTE ;  Start 3/25/20 

at 10:00;  Stop 3/27/20 at 13:57;  Status DC


Midazolam HCl 100 mg/Sodium Chloride 100 ml @ 7 mls/hr CONT  PRN IV SEE PROTOCOL

Last administered on 4/8/20at 15:35;  Start 3/28/20 at 16:00;  Stop 6/3/20 at 

14:38;  Status DC


Sodium Chloride 90 meq/Potassium Chloride 15 meq/ Potassium Phosphate 18 mmol/ 

Magnesium Sulfate 8 meq/Calcium Gluconate 15 meq/ Multivitamins 10 ml/Chromium/ 

Copper/Manganese/ Seleni/Zn 0.5 ml/ Insulin Human Regular 15 unit/ Total 

Parenteral Nutrition/Amino Acids/Dextrose/ Fat Emulsion Intravenous 1,400 ml @  

58.333 mls/ hr TPN  CONT IV  Last administered on 3/28/20at 20:34;  Start 3

/28/20 at 22:00;  Stop 3/29/20 at 21:59;  Status DC


Info ([facility] Electrolyte Protocol) 1 ea CONT PRN  PRN MC PER PROTOCOL;  Start 3

/29/20 at 13:15


Sodium Chloride 90 meq/Potassium Chloride 15 meq/ Potassium Phosphate 18 mmol/ 

Magnesium Sulfate 8 meq/Calcium Gluconate 15 meq/ Multivitamins 10 ml/Chromium/ 

Copper/Manganese/ Seleni/Zn 0.5 ml/ Insulin Human Regular 15 unit/ Total 

Parenteral Nutrition/Amino Acids/Dextrose/ Fat Emulsion Intravenous 1,400 ml @  

58.333 mls/ hr TPN  CONT IV  Last administered on 3/29/20at 22:05;  Start 

3/29/20 at 22:00;  Stop 3/30/20 at 21:59;  Status DC


Potassium Chloride 15 meq/ Bicarbonate Dialysis Soln w/ out KCl 5,007.5 ml  @ 

1,000 mls/ hr Q5H1M IV  Last administered on 4/1/20at 18:14;  Start 3/29/20 at 

20:00;  Stop 4/2/20 at 13:08;  Status DC


Potassium Chloride 15 meq/ Bicarbonate Dialysis Soln w/ out KCl 5,007.5 ml  @ 

1,000 mls/ hr Q5H1M IV  Last administered on 4/1/20at 18:14;  Start 3/29/20 at 

20:00;  Stop 4/2/20 at 13:08;  Status DC


Potassium Chloride 15 meq/ Bicarbonate Dialysis Soln w/ out KCl 5,007.5 ml  @ 

1,000 mls/ hr Q5H1M IV  Last administered on 4/1/20at 18:14;  Start 3/29/20 at 

20:00;  Stop 4/2/20 at 13:08;  Status DC


Iohexol (Omnipaque 240 Mg/ml) 30 ml 1X  ONCE PO  Last administered on 3/30/20at 

11:30;  Start 3/30/20 at 11:30;  Stop 3/30/20 at 11:33;  Status DC


Info (CONTRAST GIVEN -- Rx MONITORING) 1 each PRN DAILY  PRN MC SEE COMMENTS;  

Start 3/30/20 at 11:45;  Stop 4/1/20 at 11:44;  Status DC


Sodium Chloride 90 meq/Potassium Chloride 15 meq/ Potassium Phosphate 18 mmol/ 

Magnesium Sulfate 8 meq/Calcium Gluconate 15 meq/ Multivitamins 10 ml/Chromium/ 

Copper/Manganese/ Seleni/Zn 0.5 ml/ Insulin Human Regular 15 unit/ Total 

Parenteral Nutrition/Amino Acids/Dextrose/ Fat Emulsion Intravenous 1,400 ml @  

58.333 mls/ hr TPN  CONT IV  Last administered on 3/30/20at 21:47;  Start 

3/30/20 at 22:00;  Stop 3/31/20 at 21:59;  Status DC


Sodium Chloride 90 meq/Potassium Chloride 15 meq/ Potassium Phosphate 18 mmol/ 

Magnesium Sulfate 8 meq/Calcium Gluconate 15 meq/ Multivitamins 10 ml/Chromium/ 

Copper/Manganese/ Seleni/Zn 0.5 ml/ Insulin Human Regular 20 unit/ Total 

Parenteral Nutrition/Amino Acids/Dextrose/ Fat Emulsion Intravenous 1,400 ml @  

58.333 mls/ hr TPN  CONT IV  Last administered on 3/31/20at 21:36;  Start 

3/31/20 at 22:00;  Stop 4/1/20 at 21:59;  Status DC


Alteplase, Recombinant (Cathflo For Central Catheter Clearance) 1 mg 1X  ONCE 

INT CAT  Last administered on 3/31/20at 20:03;  Start 3/31/20 at 19:30;  Stop 

3/31/20 at 19:46;  Status DC


Alteplase, Recombinant (Cathflo For Central Catheter Clearance) 1 mg 1X  ONCE 

INT CAT  Last administered on 3/31/20at 22:05;  Start 3/31/20 at 22:00;  Stop 

3/31/20 at 22:01;  Status DC


Sodium Chloride 90 meq/Potassium Chloride 15 meq/ Potassium Phosphate 18 mmol/ 

Magnesium Sulfate 8 meq/Calcium Gluconate 15 meq/ Multivitamins 10 ml/Chromium/ 

Copper/Manganese/ Seleni/Zn 0.5 ml/ Insulin Human Regular 20 unit/ Total Pare

nteral Nutrition/Amino Acids/Dextrose/ Fat Emulsion Intravenous 1,400 ml @  

58.333 mls/ hr TPN  CONT IV  Last administered on 4/1/20at 21:30;  Start 4/1/20 

at 22:00;  Stop 4/2/20 at 21:59;  Status DC


Dexmedetomidine HCl 400 mcg/ Sodium Chloride 100 ml @ 0 mls/hr CONT  PRN IV 

ANXIETY / AGITATION Last administered on 5/30/20at 12:57;  Start 4/2/20 at 

08:15;  Stop 5/30/20 at 18:31;  Status DC


Sodium Chloride 500 ml @  500 mls/hr 1X PRN  PRN IV ELEVATED BP, SEE COMMENTS;  

Start 4/2/20 at 08:15


Atropine Sulfate (ATROPINE 0.5mg SYRINGE) 0.5 mg PRN Q5MIN  PRN IV SEE COMMENTS;

 Start 4/2/20 at 08:15


Furosemide (Lasix) 20 mg 1X  ONCE IVP  Last administered on 4/2/20at 08:19;  

Start 4/2/20 at 08:15;  Stop 4/2/20 at 08:16;  Status DC


Lidocaine HCl (Buffered Lidocaine 1%) 3 ml STK-MED ONCE .ROUTE ;  Start 4/2/20 

at 08:39;  Stop 4/2/20 at 08:39;  Status DC


Lidocaine HCl (Buffered Lidocaine 1%) 6 ml 1X  ONCE INJ  Last administered on 

4/2/20at 09:05;  Start 4/2/20 at 09:00;  Stop 4/2/20 at 09:06;  Status DC


Sodium Chloride 90 meq/Potassium Chloride 15 meq/ Potassium Phosphate 18 mmol/ 

Magnesium Sulfate 8 meq/Calcium Gluconate 15 meq/ Multivitamins 10 ml/Chromium/ 

Copper/Manganese/ Seleni/Zn 0.5 ml/ Insulin Human Regular 20 unit/ Total 

Parenteral Nutrition/Amino Acids/Dextrose/ Fat Emulsion Intravenous 1,400 ml @  

58.333 mls/ hr TPN  CONT IV  Last administered on 4/2/20at 22:45;  Start 4/2/20 

at 22:00;  Stop 4/3/20 at 21:59;  Status DC


Sodium Chloride 1,000 ml @  1,000 mls/hr Q1H PRN IV hypotension;  Start 4/3/20 

at 07:30;  Stop 4/3/20 at 13:29;  Status DC


Albumin Human 200 ml @  200 mls/hr 1X PRN  PRN IV Hypotension Last administered 

on 4/3/20at 09:36;  Start 4/3/20 at 07:30;  Stop 4/3/20 at 13:29;  Status DC


Sodium Chloride (Normal Saline Flush) 10 ml 1X PRN  PRN IV AP catheter pack;  

Start 4/3/20 at 07:30;  Stop 4/3/20 at 21:29;  Status DC


Sodium Chloride (Normal Saline Flush) 10 ml 1X PRN  PRN IV  catheter pack;  

Start 4/3/20 at 07:30;  Stop 4/4/20 at 07:29;  Status DC


Sodium Chloride 1,000 ml @  400 mls/hr Q2H30M PRN IV PATENCY;  Start 4/3/20 at 

07:30;  Stop 4/3/20 at 19:29;  Status DC


Info (PHARMACY MONITORING -- do not chart) 1 each PRN DAILY  PRN MC SEE 

COMMENTS;  Start 4/3/20 at 07:30;  Stop 4/3/20 at 13:02;  Status DC


Info (PHARMACY MONITORING -- do not chart) 1 each PRN DAILY  PRN MC SEE 

COMMENTS;  Start 4/3/20 at 07:30;  Stop 4/5/20 at 12:45;  Status DC


Sodium Chloride 90 meq/Potassium Chloride 15 meq/ Potassium Phosphate 10 mmol/ 

Magnesium Sulfate 8 meq/Calcium Gluconate 15 meq/ Multivitamins 10 ml/Chromium/ 

Copper/Manganese/ Seleni/Zn 0.5 ml/ Insulin Human Regular 25 unit/ Total 

Parenteral Nutrition/Amino Acids/Dextrose/ Fat Emulsion Intravenous 1,400 ml @  

58.333 mls/ hr TPN  CONT IV  Last administered on 4/3/20at 22:19;  Start 4/3/20 

at 22:00;  Stop 4/4/20 at 21:59;  Status DC


Heparin Sodium (Porcine) (Heparin Sodium) 5,000 unit Q12HR SQ  Last administered

on 4/26/20at 08:59;  Start 4/3/20 at 21:00;  Stop 4/26/20 at 10:05;  Status DC


Ondansetron HCl (Zofran) 4 mg PRN Q6HRS  PRN IV NAUSEA/VOMITING;  Start 4/6/20 

at 07:00;  Stop 4/7/20 at 06:59;  Status DC


Fentanyl Citrate (Fentanyl 2ml Vial) 25 mcg PRN Q5MIN  PRN IV MILD PAIN 1-3;  

Start 4/6/20 at 07:00;  Stop 4/7/20 at 06:59;  Status DC


Fentanyl Citrate (Fentanyl 2ml Vial) 50 mcg PRN Q5MIN  PRN IV MODERATE TO SEVERE

PAIN;  Start 4/6/20 at 07:00;  Stop 4/7/20 at 06:59;  Status DC


Ringer's Solution 1,000 ml @  30 mls/hr Q24H IV ;  Start 4/6/20 at 07:00;  Stop 

4/6/20 at 18:59;  Status DC


Lidocaine HCl (Xylocaine-Mpf 1% 2ml Vial) 2 ml PRN 1X  PRN ID PRIOR TO IV START;

 Start 4/6/20 at 07:00;  Stop 4/7/20 at 06:59;  Status DC


Prochlorperazine Edisylate (Compazine) 5 mg PACU PRN  PRN IV NAUSEA, MRX1;  

Start 4/6/20 at 07:00;  Stop 4/7/20 at 06:59;  Status DC


Sodium Chloride 1,000 ml @  1,000 mls/hr Q1H PRN IV hypotension;  Start 4/4/20 

at 09:10;  Stop 4/4/20 at 15:09;  Status DC


Albumin Human 200 ml @  200 mls/hr 1X PRN  PRN IV Hypotension Last administered 

on 4/4/20at 10:10;  Start 4/4/20 at 09:15;  Stop 4/4/20 at 15:14;  Status DC


Sodium Chloride 1,000 ml @  400 mls/hr Q2H30M PRN IV PATENCY;  Start 4/4/20 at 

09:10;  Stop 4/4/20 at 21:09;  Status DC


Info (PHARMACY MONITORING -- do not chart) 1 each PRN DAILY  PRN MC SEE 

COMMENTS;  Start 4/4/20 at 09:15;  Stop 4/5/20 at 12:45;  Status DC


Info (PHARMACY MONITORING -- do not chart) 1 each PRN DAILY  PRN MC SEE 

COMMENTS;  Start 4/4/20 at 09:15;  Stop 4/5/20 at 12:45;  Status DC


Sodium Chloride 90 meq/Potassium Chloride 15 meq/ Potassium Phosphate 10 mmol/ 

Magnesium Sulfate 8 meq/Calcium Gluconate 15 meq/ Multivitamins 10 ml/Chromium/ 

Copper/Manganese/ Seleni/Zn 0.5 ml/ Insulin Human Regular 25 unit/ Total 

Parenteral Nutrition/Amino Acids/Dextrose/ Fat Emulsion Intravenous 1,400 ml @  

58.333 mls/ hr TPN  CONT IV  Last administered on 4/4/20at 22:10;  Start 4/4/20 

at 22:00;  Stop 4/5/20 at 21:59;  Status DC


Magnesium Sulfate 50 ml @ 25 mls/hr PRN DAILY  PRN IV for Mag < 1.7 on am labs 

Last administered on 6/18/20at 10:57;  Start 4/5/20 at 09:15


Sodium Chloride 90 meq/Potassium Chloride 15 meq/ Potassium Phosphate 10 mmol/ 

Magnesium Sulfate 8 meq/Calcium Gluconate 15 meq/ Multivitamins 10 ml/Chromium/ 

Copper/Manganese/ Seleni/Zn 0.5 ml/ Insulin Human Regular 25 unit/ Total 

Parenteral Nutrition/Amino Acids/Dextrose/ Fat Emulsion Intravenous 1,400 ml @  

58.333 mls/ hr TPN  CONT IV  Last administered on 4/5/20at 21:20;  Start 4/5/20 

at 22:00;  Stop 4/6/20 at 21:59;  Status DC


Sodium Chloride 1,000 ml @  1,000 mls/hr Q1H PRN IV hypotension;  Start 4/5/20 

at 12:23;  Stop 4/5/20 at 18:22;  Status DC


Albumin Human 200 ml @  200 mls/hr 1X  ONCE IV  Last administered on 4/5/20at 

13:34;  Start 4/5/20 at 12:30;  Stop 4/5/20 at 13:29;  Status DC


Diphenhydramine HCl (Benadryl) 25 mg 1X PRN  PRN IV ITCHING;  Start 4/5/20 at 

12:30;  Stop 4/6/20 at 12:29;  Status DC


Diphenhydramine HCl (Benadryl) 25 mg 1X PRN  PRN IV ITCHING;  Start 4/5/20 at 

12:30;  Stop 4/6/20 at 12:29;  Status DC


Info (PHARMACY MONITORING -- do not chart) 1 each PRN DAILY  PRN MC SEE 

COMMENTS;  Start 4/5/20 at 12:30;  Status Cancel


Bupivacaine HCl/ Epinephrine Bitart (Sensorcain-Epi 0.5%-1:443138 Mpf) 30 ml 

STK-MED ONCE .ROUTE  Last administered on 4/6/20at 11:44;  Start 4/6/20 at 

11:00;  Stop 4/6/20 at 11:01;  Status DC


Cellulose (Surgicel Fibrillar 1x2) 1 each STK-MED ONCE .ROUTE ;  Start 4/6/20 at

11:00;  Stop 4/6/20 at 11:01;  Status DC


Sodium Chloride 90 meq/Potassium Chloride 15 meq/ Potassium Phosphate 10 mmol/ 

Magnesium Sulfate 12 meq/Calcium Gluconate 15 meq/ Multivitamins 10 ml/Chromium/

Copper/Manganese/ Seleni/Zn 0.5 ml/ Insulin Human Regular 25 unit/ Total 

Parenteral Nutrition/Amino Acids/Dextrose/ Fat Emulsion Intravenous 1,400 ml @  

58.333 mls/ hr TPN  CONT IV  Last administered on 4/6/20at 22:24;  Start 4/6/20 

at 22:00;  Stop 4/7/20 at 21:59;  Status DC


Propofol 20 ml @ As Directed STK-MED ONCE IV ;  Start 4/6/20 at 11:07;  Stop 

4/6/20 at 11:07;  Status DC


Cellulose (Surgicel Hemostat 4x8) 1 each STK-MED ONCE .ROUTE  Last administered 

on 4/6/20at 11:44;  Start 4/6/20 at 11:55;  Stop 4/6/20 at 11:56;  Status DC


Sevoflurane (Ultane) 60 ml STK-MED ONCE IH ;  Start 4/6/20 at 12:46;  Stop 

4/6/20 at 12:46;  Status DC


Sodium Chloride 1,000 ml @  1,000 mls/hr Q1H PRN IV hypotension;  Start 4/6/20 

at 13:51;  Stop 4/6/20 at 19:50;  Status DC


Albumin Human 200 ml @  200 mls/hr 1X PRN  PRN IV Hypotension Last administered 

on 4/6/20at 14:51;  Start 4/6/20 at 14:00;  Stop 4/6/20 at 19:59;  Status DC


Diphenhydramine HCl (Benadryl) 25 mg 1X PRN  PRN IV ITCHING;  Start 4/6/20 at 

14:00;  Stop 4/7/20 at 13:59;  Status DC


Diphenhydramine HCl (Benadryl) 25 mg 1X PRN  PRN IV ITCHING;  Start 4/6/20 at 

14:00;  Stop 4/7/20 at 13:59;  Status DC


Sodium Chloride 1,000 ml @  400 mls/hr Q2H30M PRN IV PATENCY;  Start 4/6/20 at 

13:51;  Stop 4/7/20 at 01:50;  Status DC


Info (PHARMACY MONITORING -- do not chart) 1 each PRN DAILY  PRN MC SEE 

COMMENTS;  Start 4/6/20 at 14:00;  Stop 4/9/20 at 08:16;  Status DC


Heparin Sodium (Porcine) (Hep Lock Adult) 500 unit STK-MED ONCE IVP ;  Start 4/ 7/20 at 09:29;  Stop 4/7/20 at 09:30;  Status DC


Sodium Chloride 1,000 ml @  1,000 mls/hr Q1H PRN IV hypotension;  Start 4/7/20 

at 10:43;  Stop 4/7/20 at 16:42;  Status DC


Sodium Chloride 1,000 ml @  400 mls/hr Q2H30M PRN IV PATENCY;  Start 4/7/20 at 

10:43;  Stop 4/7/20 at 22:42;  Status DC


Info (PHARMACY MONITORING -- do not chart) 1 each PRN DAILY  PRN MC SEE 

COMMENTS;  Start 4/7/20 at 10:45;  Status UNV


Info (PHARMACY MONITORING -- do not chart) 1 each PRN DAILY  PRN MC SEE 

COMMENTS;  Start 4/7/20 at 10:45;  Status UNV


Sodium Chloride 90 meq/Potassium Chloride 15 meq/ Magnesium Sulfate 12 

meq/Calcium Gluconate 15 meq/ Multivitamins 10 ml/Chromium/ Copper/Manganese/ 

Seleni/Zn 0.5 ml/ Insulin Human Regular 25 unit/ Total Parenteral 

Nutrition/Amino Acids/Dextrose/ Fat Emulsion Intravenous 1,400 ml @  58.333 mls/

hr TPN  CONT IV  Last administered on 4/7/20at 22:13;  Start 4/7/20 at 22:00;  

Stop 4/8/20 at 21:59;  Status DC


Sodium Chloride 1,000 ml @  1,000 mls/hr Q1H PRN IV hypotension;  Start 4/8/20 

at 07:50;  Stop 4/8/20 at 13:49;  Status DC


Albumin Human 200 ml @  200 mls/hr 1X  ONCE IV ;  Start 4/8/20 at 08:00;  Stop 

4/8/20 at 08:53;  Status DC


Diphenhydramine HCl (Benadryl) 25 mg 1X PRN  PRN IV ITCHING;  Start 4/8/20 at 

08:00;  Stop 4/9/20 at 07:59;  Status DC


Diphenhydramine HCl (Benadryl) 25 mg 1X PRN  PRN IV ITCHING;  Start 4/8/20 at 

08:00;  Stop 4/9/20 at 07:59;  Status DC


Info (PHARMACY MONITORING -- do not chart) 1 each PRN DAILY  PRN MC SEE 

COMMENTS;  Start 4/8/20 at 08:00;  Stop 4/9/20 at 08:16;  Status DC


Albumin Human 50 ml @ 50 mls/hr 1X  ONCE IV ;  Start 4/8/20 at 08:53;  Stop 

4/8/20 at 08:56;  Status DC


Albumin Human 200 ml @  50 mls/hr PRN 1X  PRN IV HYPOTENSION Last administered 

on 4/14/20at 11:54;  Start 4/8/20 at 09:00;  Stop 5/21/20 at 11:14;  Status DC


Meropenem 500 mg/ Sodium Chloride 50 ml @  100 mls/hr Q12H IV  Last administered

on 4/28/20at 10:45;  Start 4/8/20 at 10:00;  Stop 4/28/20 at 12:37;  Status DC


Sodium Chloride 90 meq/Magnesium Sulfate 12 meq/ Calcium Gluconate 15 meq/ 

Multivitamins 10 ml/Chromium/ Copper/Manganese/ Seleni/Zn 0.5 ml/ Insulin Human 

Regular 25 unit/ Total Parenteral Nutrition/Amino Acids/Dextrose/ Fat Emulsion 

Intravenous 1,400 ml @  58.333 mls/ hr TPN  CONT IV  Last administered on 

4/8/20at 21:41;  Start 4/8/20 at 22:00;  Stop 4/9/20 at 21:59;  Status DC


Sodium Chloride 1,000 ml @  1,000 mls/hr Q1H PRN IV hypotension;  Start 4/9/20 

at 07:58;  Stop 4/9/20 at 13:57;  Status DC


Albumin Human 200 ml @  200 mls/hr 1X PRN  PRN IV Hypotension Last administered 

on 4/9/20at 09:30;  Start 4/9/20 at 08:00;  Stop 4/9/20 at 13:59;  Status DC


Sodium Chloride 1,000 ml @  400 mls/hr Q2H30M PRN IV PATENCY;  Start 4/9/20 at 

07:58;  Stop 4/9/20 at 19:57;  Status DC


Info (PHARMACY MONITORING -- do not chart) 1 each PRN DAILY  PRN MC SEE 

COMMENTS;  Start 4/9/20 at 08:00;  Status Cancel


Info (PHARMACY MONITORING -- do not chart) 1 each PRN DAILY  PRN MC SEE COM

MENTS;  Start 4/9/20 at 08:15;  Status UNV


Sodium Chloride 90 meq/Potassium Phosphate 5 mmol/ Magnesium Sulfate 12 

meq/Calcium Gluconate 15 meq/ Multivitamins 10 ml/Chromium/ Copper/Manganese/ 

Seleni/Zn 0.5 ml/ Insulin Human Regular 30 unit/ Total Parenteral 

Nutrition/Amino Acids/Dextrose/ Fat Emulsion Intravenous 1,400 ml @  58.333 mls/

hr TPN  CONT IV  Last administered on 4/9/20at 22:08;  Start 4/9/20 at 22:00;  

Stop 4/10/20 at 21:59;  Status DC


Linezolid/Dextrose 300 ml @  300 mls/hr Q12HR IV  Last administered on 4/20/20at

20:40;  Start 4/10/20 at 11:00;  Stop 4/21/20 at 08:10;  Status DC


Sodium Chloride 90 meq/Potassium Phosphate 15 mmol/ Magnesium Sulfate 12 

meq/Calcium Gluconate 15 meq/ Multivitamins 10 ml/Chromium/ Copper/Manganese/ 

Seleni/Zn 0.5 ml/ Insulin Human Regular 30 unit/ Total Parenteral 

Nutrition/Amino Acids/Dextrose/ Fat Emulsion Intravenous 1,400 ml @  58.333 mls/

hr TPN  CONT IV  Last administered on 4/10/20at 21:49;  Start 4/10/20 at 22:00; 

Stop 4/11/20 at 21:59;  Status DC


Sodium Chloride 90 meq/Potassium Phosphate 15 mmol/ Magnesium Sulfate 12 

meq/Calcium Gluconate 15 meq/ Multivitamins 10 ml/Chromium/ Copper/Manganese/ 

Seleni/Zn 0.5 ml/ Insulin Human Regular 40 unit/ Total Parenteral 

Nutrition/Amino Acids/Dextrose/ Fat Emulsion Intravenous 1,400 ml @  58.333 mls/

hr TPN  CONT IV  Last administered on 4/11/20at 21:21;  Start 4/11/20 at 22:00; 

Stop 4/12/20 at 21:59;  Status DC


Sodium Chloride 1,000 ml @  1,000 mls/hr Q1H PRN IV hypotension;  Start 4/11/20 

at 13:26;  Stop 4/11/20 at 19:25;  Status DC


Albumin Human 200 ml @  200 mls/hr 1X PRN  PRN IV Hypotension Last administered 

on 4/11/20at 15:00;  Start 4/11/20 at 13:30;  Stop 4/11/20 at 19:29;  Status DC


Sodium Chloride (Normal Saline Flush) 10 ml 1X PRN  PRN IV AP catheter pack;  St

art 4/11/20 at 13:30;  Stop 4/12/20 at 13:29;  Status DC


Sodium Chloride (Normal Saline Flush) 10 ml 1X PRN  PRN IV  catheter pack;  

Start 4/11/20 at 13:30;  Stop 4/12/20 at 13:29;  Status DC


Sodium Chloride 1,000 ml @  400 mls/hr Q2H30M PRN IV PATENCY;  Start 4/11/20 at 

13:26;  Stop 4/12/20 at 01:25;  Status DC


Info (PHARMACY MONITORING -- do not chart) 1 each PRN DAILY  PRN MC SEE 

COMMENTS;  Start 4/11/20 at 13:30;  Stop 4/11/20 at 13:33;  Status DC


Info (PHARMACY MONITORING -- do not chart) 1 each PRN DAILY  PRN MC SEE 

COMMENTS;  Start 4/11/20 at 13:30;  Stop 4/11/20 at 13:34;  Status DC


Sodium Chloride 90 meq/Potassium Phosphate 19 mmol/ Magnesium Sulfate 12 

meq/Calcium Gluconate 15 meq/ Multivitamins 10 ml/Chromium/ Copper/Manganese/ 

Seleni/Zn 0.5 ml/ Insulin Human Regular 40 unit/ Total Parenteral 

Nutrition/Amino Acids/Dextrose/ Fat Emulsion Intravenous 1,400 ml @  58.333 mls/

hr TPN  CONT IV  Last administered on 4/12/20at 21:54;  Start 4/12/20 at 22:00; 

Stop 4/13/20 at 21:59;  Status DC


Sodium Chloride 1,000 ml @  1,000 mls/hr Q1H PRN IV hypotension;  Start 4/13/20 

at 09:35;  Stop 4/13/20 at 15:34;  Status DC


Albumin Human 200 ml @  200 mls/hr 1X PRN  PRN IV Hypotension;  Start 4/13/20 at

09:45;  Stop 4/13/20 at 15:44;  Status DC


Diphenhydramine HCl (Benadryl) 25 mg 1X PRN  PRN IV ITCHING;  Start 4/13/20 at 

09:45;  Stop 4/14/20 at 09:44;  Status DC


Diphenhydramine HCl (Benadryl) 25 mg 1X PRN  PRN IV ITCHING;  Start 4/13/20 at 

09:45;  Stop 4/14/20 at 09:44;  Status DC


Sodium Chloride 1,000 ml @  400 mls/hr Q2H30M PRN IV PATENCY;  Start 4/13/20 at 

09:35;  Stop 4/13/20 at 21:34;  Status DC


Info (PHARMACY MONITORING -- do not chart) 1 each PRN DAILY  PRN MC SEE 

COMMENTS;  Start 4/13/20 at 09:45;  Status Cancel


Sodium Chloride 100 meq/Potassium Phosphate 19 mmol/ Magnesium Sulfate 12 

meq/Calcium Gluconate 15 meq/ Multivitamins 10 ml/Chromium/ Copper/Manganese/ 

Seleni/Zn 0.5 ml/ Insulin Human Regular 40 unit/ Potassium Chloride 20 meq/ 

Total Parenteral Nutrition/Amino Acids/Dextrose/ Fat Emulsion Intravenous 1,400 

ml @  58.333 mls/ hr TPN  CONT IV  Last administered on 4/13/20at 22:02;  Start 

4/13/20 at 22:00;  Stop 4/14/20 at 21:59;  Status DC


Furosemide (Lasix) 40 mg 1X  ONCE IVP  Last administered on 4/13/20at 14:39;  

Start 4/13/20 at 14:30;  Stop 4/13/20 at 14:31;  Status DC


Metronidazole 100 ml @  100 mls/hr Q8HRS IV  Last administered on 4/21/20at 

06:04;  Start 4/14/20 at 10:00;  Stop 4/21/20 at 08:10;  Status DC


Sodium Chloride 1,000 ml @  1,000 mls/hr Q1H PRN IV hypotension;  Start 4/14/20 

at 08:00;  Stop 4/14/20 at 13:59;  Status DC


Albumin Human 200 ml @  200 mls/hr 1X PRN  PRN IV Hypotension;  Start 4/14/20 at

08:00;  Stop 4/14/20 at 13:59;  Status DC


Sodium Chloride 1,000 ml @  400 mls/hr Q2H30M PRN IV PATENCY;  Start 4/14/20 at 

08:00;  Stop 4/14/20 at 19:59;  Status DC


Info (PHARMACY MONITORING -- do not chart) 1 each PRN DAILY  PRN MC SEE 

COMMENTS;  Start 4/14/20 at 11:30;  Status UNV


Info (PHARMACY MONITORING -- do not chart) 1 each PRN DAILY  PRN MC SEE 

COMMENTS;  Start 4/14/20 at 11:30;  Stop 4/16/20 at 12:13;  Status DC


Sodium Chloride 100 meq/Potassium Phosphate 19 mmol/ Magnesium Sulfate 12 

meq/Calcium Gluconate 15 meq/ Multivitamins 10 ml/Chromium/ Copper/Manganese/ 

Seleni/Zn 0.5 ml/ Insulin Human Regular 40 unit/ Potassium Chloride 20 meq/ T

otal Parenteral Nutrition/Amino Acids/Dextrose/ Fat Emulsion Intravenous 1,400 

ml @  58.333 mls/ hr TPN  CONT IV  Last administered on 4/14/20at 21:52;  Start 

4/14/20 at 22:00;  Stop 4/15/20 at 21:59;  Status DC


Sodium Chloride (Normal Saline Flush) 10 ml QSHIFT  PRN IV AFTER MEDS AND BLOOD 

DRAWS;  Start 4/14/20 at 15:00;  Stop 5/12/20 at 11:27;  Status DC


Sodium Chloride (Normal Saline Flush) 10 ml PRN Q5MIN  PRN IV AFTER MEDS AND 

BLOOD DRAWS;  Start 4/14/20 at 15:00


Sodium Chloride (Normal Saline Flush) 20 ml PRN Q5MIN  PRN IV AFTER MEDS AND 

BLOOD DRAWS;  Start 4/14/20 at 15:00


Sodium Chloride 100 meq/Potassium Phosphate 19 mmol/ Magnesium Sulfate 12 

meq/Calcium Gluconate 15 meq/ Multivitamins 10 ml/Chromium/ Copper/Manganese/ 

Seleni/Zn 0.5 ml/ Insulin Human Regular 40 unit/ Potassium Chloride 20 meq/ 

Total Parenteral Nutrition/Amino Acids/Dextrose/ Fat Emulsion Intravenous 1,400 

ml @  58.333 mls/ hr TPN  CONT IV  Last administered on 4/15/20at 21:20;  Start 

4/15/20 at 22:00;  Stop 4/16/20 at 21:59;  Status DC


Lidocaine HCl (Buffered Lidocaine 1%) 3 ml STK-MED ONCE .ROUTE ;  Start 4/15/20 

at 13:16;  Stop 4/15/20 at 13:16;  Status DC


Lidocaine HCl (Buffered Lidocaine 1%) 6 ml 1X  ONCE INJ  Last administered on 

4/15/20at 13:45;  Start 4/15/20 at 13:30;  Stop 4/15/20 at 13:31;  Status DC


Albumin Human 100 ml @  100 mls/hr 1X  ONCE IV  Last administered on 4/15/20at 

15:41;  Start 4/15/20 at 15:00;  Stop 4/15/20 at 15:59;  Status DC


Albumin Human 50 ml @ 50 mls/hr 1X  ONCE IV  Last administered on 4/15/20at 

15:00;  Start 4/15/20 at 15:00;  Stop 4/15/20 at 15:59;  Status DC


Info (PHARMACY MONITORING -- do not chart) 1 each PRN DAILY  PRN MC SEE 

COMMENTS;  Start 4/16/20 at 11:30;  Status Cancel


Info (PHARMACY MONITORING -- do not chart) 1 each PRN DAILY  PRN MC SEE C

OMMENTS;  Start 4/16/20 at 11:30;  Status UNV


Sodium Chloride 100 meq/Potassium Phosphate 10 mmol/ Magnesium Sulfate 12 

meq/Calcium Gluconate 15 meq/ Multivitamins 10 ml/Chromium/ Copper/Manganese/ 

Seleni/Zn 0.5 ml/ Insulin Human Regular 35 unit/ Potassium Chloride 20 meq/ 

Total Parenteral Nutrition/Amino Acids/Dextrose/ Fat Emulsion Intravenous 1,400 

ml @  58.333 mls/ hr TPN  CONT IV  Last administered on 4/16/20at 22:10;  Start 

4/16/20 at 22:00;  Stop 4/17/20 at 21:59;  Status DC


Sodium Chloride 100 meq/Potassium Phosphate 5 mmol/ Magnesium Sulfate 12 

meq/Calcium Gluconate 15 meq/ Multivitamins 10 ml/Chromium/ Copper/Manganese/ Se

aram/Zn 0.5 ml/ Insulin Human Regular 35 unit/ Potassium Chloride 20 meq/ Total 

Parenteral Nutrition/Amino Acids/Dextrose/ Fat Emulsion Intravenous 1,400 ml @  

58.333 mls/ hr TPN  CONT IV  Last administered on 4/17/20at 22:59;  Start 

4/17/20 at 22:00;  Stop 4/18/20 at 21:59;  Status DC


Sodium Chloride 1,000 ml @  1,000 mls/hr Q1H PRN IV hypotension;  Start 4/18/20 

at 08:27;  Stop 4/18/20 at 14:26;  Status DC


Albumin Human 200 ml @  200 mls/hr 1X PRN  PRN IV Hypotension Last administered 

on 4/18/20at 09:18;  Start 4/18/20 at 08:30;  Stop 4/18/20 at 14:29;  Status DC


Sodium Chloride 1,000 ml @  400 mls/hr Q2H30M PRN IV PATENCY;  Start 4/18/20 at 

08:27;  Stop 4/18/20 at 20:26;  Status DC


Info (PHARMACY MONITORING -- do not chart) 1 each PRN DAILY  PRN MC SEE CO

MMENTS;  Start 4/18/20 at 08:30;  Status Cancel


Info (PHARMACY MONITORING -- do not chart) 1 each PRN DAILY  PRN MC SEE COMMENTS

;  Start 4/18/20 at 08:30;  Stop 4/26/20 at 13:10;  Status DC


Sodium Chloride 100 meq/Potassium Chloride 40 meq/ Magnesium Sulfate 15 

meq/Calcium Gluconate 15 meq/ Multivitamins 10 ml/Chromium/ Copper/Manganese/ 

Seleni/Zn 0.5 ml/ Insulin Human Regular 35 unit/ Total Parenteral 

Nutrition/Amino Acids/Dextrose/ Fat Emulsion Intravenous 1,400 ml @  58.333 mls/

hr TPN  CONT IV  Last administered on 4/18/20at 22:00;  Start 4/18/20 at 22:00; 

Stop 4/19/20 at 21:59;  Status DC


Potassium Chloride/Water 100 ml @  100 mls/hr 1X  ONCE IV  Last administered on 

4/18/20at 17:28;  Start 4/18/20 at 14:45;  Stop 4/18/20 at 15:44;  Status DC


Sodium Chloride 100 meq/Potassium Chloride 40 meq/ Magnesium Sulfate 15 

meq/Calcium Gluconate 15 meq/ Multivitamins 10 ml/Chromium/ Copper/Manganese/ 

Seleni/Zn 0.5 ml/ Insulin Human Regular 35 unit/ Total Parenteral 

Nutrition/Amino Acids/Dextrose/ Fat Emulsion Intravenous 1,400 ml @  58.333 mls/

hr TPN  CONT IV  Last administered on 4/19/20at 22:46;  Start 4/19/20 at 22:00; 

Stop 4/20/20 at 21:59;  Status DC


Sodium Chloride 100 meq/Potassium Chloride 40 meq/ Magnesium Sulfate 20 

meq/Calcium Gluconate 15 meq/ Multivitamins 10 ml/Chromium/ Copper/Manganese/ 

Seleni/Zn 0.5 ml/ Insulin Human Regular 35 unit/ Total Parenteral 

Nutrition/Amino Acids/Dextrose/ Fat Emulsion Intravenous 1,400 ml @  58.333 mls/

hr TPN  CONT IV  Last administered on 4/20/20at 22:31;  Start 4/20/20 at 22:00; 

Stop 4/21/20 at 21:59;  Status DC


Fentanyl Citrate (Fentanyl 2ml Vial) 50 mcg PRN Q2HR  PRN IVP PAIN Last 

administered on 4/27/20at 13:32;  Start 4/20/20 at 21:00;  Stop 4/28/20 at 

12:53;  Status DC


Fentanyl Citrate (Fentanyl 2ml Vial) 25 mcg PRN Q2HR  PRN IVP PAIN;  Start 

4/20/20 at 21:00;  Stop 4/28/20 at 12:54;  Status DC


Enoxaparin Sodium (Lovenox 100mg Syringe) 100 mg Q12HR SQ ;  Start 4/21/20 at 

21:00;  Status UNV


Amino Acids/ Glycerin/ Electrolytes 1,000 ml @  75 mls/hr [id number] IV ;  Start 

4/20/20 at 21:15;  Status UNV


Sodium Chloride 1,000 ml @  1,000 mls/hr Q1H PRN IV hypotension;  Start 4/21/20 

at 07:56;  Stop 4/21/20 at 13:55;  Status DC


Albumin Human 200 ml @  200 mls/hr 1X PRN  PRN IV Hypotension Last administered 

on 4/21/20at 08:40;  Start 4/21/20 at 08:00;  Stop 4/21/20 at 13:59;  Status DC


Sodium Chloride 1,000 ml @  400 mls/hr Q2H30M PRN IV PATENCY;  Start 4/21/20 at 

07:56;  Stop 4/21/20 at 19:55;  Status DC


Info (PHARMACY MONITORING -- do not chart) 1 each PRN DAILY  PRN MC SEE 

COMMENTS;  Start 4/21/20 at 08:00;  Status UNV


Info (PHARMACY MONITORING -- do not chart) 1 each PRN DAILY  PRN MC SEE 

COMMENTS;  Start 4/21/20 at 08:00;  Status UNV


Daptomycin 430 mg/ Sodium Chloride 50 ml @  100 mls/hr Q24H IV  Last 

administered on 4/21/20at 12:35;  Start 4/21/20 at 09:00;  Stop 4/21/20 at 

12:49;  Status DC


Sodium Chloride 100 meq/Potassium Chloride 40 meq/ Magnesium Sulfate 20 

meq/Calcium Gluconate 15 meq/ Multivitamins 10 ml/Chromium/ Copper/Manganese/ 

Seleni/Zn 0.5 ml/ Insulin Human Regular 35 unit/ Total Parenteral 

Nutrition/Amino Acids/Dextrose/ Fat Emulsion Intravenous 1,400 ml @  58.333 mls/

hr TPN  CONT IV  Last administered on 4/21/20at 21:26;  Start 4/21/20 at 22:00; 

Stop 4/22/20 at 21:59;  Status DC


Daptomycin 430 mg/ Sodium Chloride 50 ml @  100 mls/hr Q48H IV ;  Start 4/23/20 

at 09:00;  Stop 4/22/20 at 11:55;  Status DC


Sodium Chloride 100 meq/Potassium Chloride 40 meq/ Magnesium Sulfate 20 

meq/Calcium Gluconate 15 meq/ Multivitamins 10 ml/Chromium/ Copper/Manganese/ 

Seleni/Zn 0.5 ml/ Insulin Human Regular 35 unit/ Total Parenteral 

Nutrition/Amino Acids/Dextrose/ Fat Emulsion Intravenous 1,400 ml @  58.333 mls/

hr TPN  CONT IV  Last administered on 4/22/20at 22:27;  Start 4/22/20 at 22:00; 

Stop 4/23/20 at 21:59;  Status DC


Daptomycin 430 mg/ Sodium Chloride 50 ml @  100 mls/hr Q24H IV  Last 

administered on 4/24/20at 15:07;  Start 4/22/20 at 13:00;  Stop 4/25/20 at 

13:15;  Status DC


Sodium Chloride 100 meq/Potassium Chloride 40 meq/ Magnesium Sulfate 20 

meq/Calcium Gluconate 10 meq/ Multivitamins 10 ml/Chromium/ Copper/Manganese/ 

Seleni/Zn 0.5 ml/ Insulin Human Regular 35 unit/ Total Parenteral Nutrition

/Amino Acids/Dextrose/ Fat Emulsion Intravenous 1,400 ml @  58.333 mls/ hr TPN  

CONT IV  Last administered on 4/24/20at 00:06;  Start 4/23/20 at 22:00;  Stop 

4/24/20 at 21:59;  Status DC


Alteplase, Recombinant (Cathflo For Central Catheter Clearance) 1 mg 1X  ONCE 

INT CAT  Last administered on 4/24/20at 11:44;  Start 4/24/20 at 10:45;  Stop 

4/24/20 at 10:46;  Status DC


Ondansetron HCl (Zofran) 4 mg PRN Q6HRS  PRN IV NAUSEA/VOMITING;  Start 4/27/20 

at 07:00;  Stop 4/28/20 at 06:59;  Status DC


Fentanyl Citrate (Fentanyl 2ml Vial) 25 mcg PRN Q5MIN  PRN IV MILD PAIN 1-3;  

Start 4/27/20 at 07:00;  Stop 4/28/20 at 06:59;  Status DC


Fentanyl Citrate (Fentanyl 2ml Vial) 50 mcg PRN Q5MIN  PRN IV MODERATE TO SEVERE

PAIN Last administered on 4/27/20at 10:17;  Start 4/27/20 at 07:00;  Stop 

4/28/20 at 06:59;  Status DC


Ringer's Solution 1,000 ml @  30 mls/hr Q24H IV ;  Start 4/27/20 at 07:00;  Stop

4/27/20 at 18:59;  Status DC


Lidocaine HCl (Xylocaine-Mpf 1% 2ml Vial) 2 ml PRN 1X  PRN ID PRIOR TO IV START;

 Start 4/27/20 at 07:00;  Stop 4/28/20 at 06:59;  Status DC


Prochlorperazine Edisylate (Compazine) 5 mg PACU PRN  PRN IV NAUSEA, MRX1;  

Start 4/27/20 at 07:00;  Stop 4/28/20 at 06:59;  Status DC


Sodium Acetate 50 meq/Potassium Acetate 55 meq/ Magnesium Sulfate 20 meq/Calcium

Gluconate 10 meq/ Multivitamins 10 ml/Chromium/ Copper/Manganese/ Seleni/Zn 0.5 

ml/ Insulin Human Regular 35 unit/ Total Parenteral Nutrition/Amino 

Acids/Dextrose/ Fat Emulsion Intravenous 1,400 ml @  58.333 mls/ hr TPN  CONT IV

;  Start 4/24/20 at 22:00;  Stop 4/24/20 at 14:15;  Status DC


Sodium Acetate 50 meq/Potassium Acetate 55 meq/ Magnesium Sulfate 20 meq/Calcium

Gluconate 10 meq/ Multivitamins 10 ml/Chromium/ Copper/Manganese/ Seleni/Zn 0.5 

ml/ Insulin Human Regular 35 unit/ Total Parenteral Nutrition/Amino 

Acids/Dextrose/ Fat Emulsion Intravenous 1,800 ml @  75 mls/hr TPN  CONT IV  

Last administered on 4/24/20at 22:38;  Start 4/24/20 at 22:00;  Stop 4/25/20 at 

21:59;  Status DC


Sodium Chloride 1,000 ml @  1,000 mls/hr Q1H PRN IV hypotension;  Start 4/24/20 

at 15:31;  Stop 4/24/20 at 21:30;  Status DC


Diphenhydramine HCl (Benadryl) 25 mg 1X PRN  PRN IV ITCHING;  Start 4/24/20 at 

15:45;  Stop 4/25/20 at 15:44;  Status DC


Diphenhydramine HCl (Benadryl) 25 mg 1X PRN  PRN IV ITCHING;  Start 4/24/20 at 

15:45;  Stop 4/25/20 at 15:44;  Status DC


Sodium Chloride 1,000 ml @  400 mls/hr Q2H30M PRN IV PATENCY;  Start 4/24/20 at 

15:31;  Stop 4/25/20 at 03:30;  Status DC


Info (PHARMACY MONITORING -- do not chart) 1 each PRN DAILY  PRN MC SEE 

COMMENTS;  Start 4/24/20 at 15:45;  Stop 5/26/20 at 14:14;  Status DC


Sodium Acetate 50 meq/Potassium Acetate 55 meq/ Magnesium Sulfate 20 meq/Calcium

Gluconate 10 meq/ Multivitamins 10 ml/Chromium/ Copper/Manganese/ Seleni/Zn 0.5 

ml/ Insulin Human Regular 35 unit/ Total Parenteral Nutrition/Amino 

Acids/Dextrose/ Fat Emulsion Intravenous 1,800 ml @  75 mls/hr TPN  CONT IV  

Last administered on 4/25/20at 22:03;  Start 4/25/20 at 22:00;  Stop 4/26/20 at 

21:59;  Status DC


Daptomycin 430 mg/ Sodium Chloride 50 ml @  100 mls/hr Q24H IV  Last 

administered on 4/30/20at 13:00;  Start 4/25/20 at 13:00;  Stop 4/30/20 at 

20:58;  Status DC


Heparin Sodium (Porcine) 1000 unit/Sodium Chloride 1,001 ml @  1,001 mls/hr 1X  

ONCE IRR ;  Start 4/27/20 at 06:00;  Stop 4/27/20 at 06:59;  Status DC


Potassium Acetate 55 meq/Magnesium Sulfate 20 meq/ Calcium Gluconate 10 meq/ 

Multivitamins 10 ml/Chromium/ Copper/Manganese/ Seleni/Zn 0.5 ml/ Insulin Human 

Regular 35 unit/ Total Parenteral Nutrition/Amino Acids/Dextrose/ Fat Emulsion 

Intravenous 1,920 ml @  80 mls/hr TPN  CONT IV  Last administered on 4/26/20at 

22:10;  Start 4/26/20 at 22:00;  Stop 4/27/20 at 21:59;  Status DC


Dexamethasone Sodium Phosphate (Decadron) 4 mg STK-MED ONCE .ROUTE ;  Start 

4/27/20 at 10:56;  Stop 4/27/20 at 10:57;  Status DC


Ondansetron HCl (Zofran) 4 mg STK-MED ONCE .ROUTE ;  Start 4/27/20 at 10:56;  

Stop 4/27/20 at 10:57;  Status DC


Rocuronium Bromide (Zemuron) 50 mg STK-MED ONCE .ROUTE ;  Start 4/27/20 at 

10:56;  Stop 4/27/20 at 10:57;  Status DC


Fentanyl Citrate (Fentanyl 2ml Vial) 100 mcg STK-MED ONCE .ROUTE ;  Start 

4/27/20 at 10:56;  Stop 4/27/20 at 10:57;  Status DC


Bupivacaine HCl/ Epinephrine Bitart (Sensorcain-Epi 0.5%-1:389468 Mpf) 30 ml 

STK-MED ONCE .ROUTE  Last administered on 4/27/20at 12:01;  Start 4/27/20 at 10:

58;  Stop 4/27/20 at 10:58;  Status DC


Cellulose (Surgicel Hemostat 2x14) 1 each STK-MED ONCE .ROUTE ;  Start 4/27/20 

at 10:58;  Stop 4/27/20 at 10:59;  Status DC


Iohexol (Omnipaque 300 Mg/ml) 50 ml STK-MED ONCE .ROUTE ;  Start 4/27/20 at 

10:58;  Stop 4/27/20 at 10:59;  Status DC


Cellulose (Surgicel Hemostat 4x8) 1 each STK-MED ONCE .ROUTE ;  Start 4/27/20 at

10:58;  Stop 4/27/20 at 10:59;  Status DC


Bisacodyl (Dulcolax Supp) 10 mg STK-MED ONCE .ROUTE ;  Start 4/27/20 at 10:59;  

Stop 4/27/20 at 10:59;  Status DC


Heparin Sodium (Porcine) 1000 unit/Sodium Chloride 1,001 ml @  1,001 mls/hr 1X  

ONCE IRR ;  Start 4/27/20 at 12:00;  Stop 4/27/20 at 12:59;  Status DC


Propofol 20 ml @ As Directed STK-MED ONCE IV ;  Start 4/27/20 at 11:05;  Stop 

4/27/20 at 11:05;  Status DC


Sevoflurane (Ultane) 90 ml STK-MED ONCE IH ;  Start 4/27/20 at 11:05;  Stop 

4/27/20 at 11:05;  Status DC


Sevoflurane (Ultane) 60 ml STK-MED ONCE IH ;  Start 4/27/20 at 12:26;  Stop 

4/27/20 at 12:27;  Status DC


Propofol 20 ml @ As Directed STK-MED ONCE IV ;  Start 4/27/20 at 12:26;  Stop 

4/27/20 at 12:27;  Status DC


Phenylephrine HCl (PHENYLEPHRINE in 0.9% NACL PF) 1 mg STK-MED ONCE IV ;  Start 

4/27/20 at 12:34;  Stop 4/27/20 at 12:34;  Status DC


Heparin Sodium (Porcine) (Heparin Sodium) 5,000 unit Q12HR SQ  Last administered

on 5/6/20at 20:57;  Start 4/27/20 at 21:00;  Stop 5/7/20 at 09:59;  Status DC


Sodium Chloride (Normal Saline Flush) 3 ml QSHIFT  PRN IV AFTER MEDS AND BLOOD 

DRAWS;  Start 4/27/20 at 13:45;  Status Cancel


Naloxone HCl (Narcan) 0.4 mg PRN Q2MIN  PRN IV SEE INSTRUCTIONS Last 

administered on 6/6/20at 15:15;  Start 4/27/20 at 13:45;  Stop 7/1/20 at 16:00; 

Status DC


Sodium Chloride 1,000 ml @  25 mls/hr Q24H IV  Last administered on 5/26/20at 

13:37;  Start 4/27/20 at 13:37;  Stop 5/29/20 at 13:09;  Status DC


Naloxone HCl (Narcan) 0.4 mg PRN Q2MIN  PRN IV SEE INSTRUCTIONS;  Start 4/27/20 

at 14:30;  Status UNV


Sodium Chloride 1,000 ml @  25 mls/hr Q24H IV ;  Start 4/27/20 at 14:30;  Status

UNV


Hydromorphone HCl 30 ml @ 0 mls/hr CONT PRN  PRN IV PER PROTOCOL Last 

administered on 5/2/20at 16:08;  Start 4/27/20 at 14:30;  Stop 5/4/20 at 08:55; 

Status DC


Potassium Acetate 55 meq/Magnesium Sulfate 20 meq/ Calcium Gluconate 10 meq/ 

Multivitamins 10 ml/Chromium/ Copper/Manganese/ Seleni/Zn 0.5 ml/ Insulin Human 

Regular 35 unit/ Total Parenteral Nutrition/Amino Acids/Dextrose/ Fat Emulsion 

Intravenous 1,920 ml @  80 mls/hr TPN  CONT IV  Last administered on 4/27/20at 

22:01;  Start 4/27/20 at 22:00;  Stop 4/28/20 at 21:59;  Status DC


Bumetanide (Bumex) 2 mg BID92 IV  Last administered on 5/1/20at 13:50;  Start 

4/28/20 at 14:00;  Stop 5/2/20 at 14:10;  Status DC


Meropenem 1 gm/ Sodium Chloride 100 ml @  200 mls/hr Q8HRS IV  Last administered

on 5/22/20at 05:53;  Start 4/28/20 at 14:00;  Stop 5/22/20 at 09:31;  Status DC


Potassium Acetate 55 meq/Magnesium Sulfate 20 meq/ Calcium Gluconate 10 meq/ 

Multivitamins 10 ml/Chromium/ Copper/Manganese/ Seleni/Zn 0.5 ml/ Insulin Human 

Regular 35 unit/ Total Parenteral Nutrition/Amino Acids/Dextrose/ Fat Emulsion 

Intravenous 1,920 ml @  80 mls/hr TPN  CONT IV  Last administered on 4/28/20at 

22:02;  Start 4/28/20 at 22:00;  Stop 4/29/20 at 21:59;  Status DC


Hydromorphone HCl (Dilaudid Standard PCA) 12 mg STK-MED ONCE IV ;  Start 4/27/20

at 14:35;  Stop 4/28/20 at 13:53;  Status DC


Artificial Tears (Artificial Tears) 1 drop PRN Q15MIN  PRN OU DRY EYE Last 

administered on 6/23/20at 21:17;  Start 4/29/20 at 05:30


Hydromorphone HCl (Dilaudid Standard PCA) 12 mg STK-MED ONCE IV ;  Start 4/28/20

at 12:05;  Stop 4/29/20 at 09:15;  Status DC


Potassium Acetate 65 meq/Magnesium Sulfate 20 meq/ Calcium Gluconate 10 meq/ 

Multivitamins 10 ml/Chromium/ Copper/Manganese/ Seleni/Zn 0.5 ml/ Insulin Human 

Regular 30 unit/ Total Parenteral Nutrition/Amino Acids/Dextrose/ Fat Emulsion I

ntravenous 1,920 ml @  80 mls/hr TPN  CONT IV  Last administered on 4/29/20at 

22:22;  Start 4/29/20 at 22:00;  Stop 4/30/20 at 21:59;  Status DC


Cyclobenzaprine HCl (Flexeril) 10 mg PRN Q6HRS  PRN PO MUSCLE SPASMS;  Start 

4/30/20 at 10:45


Potassium Acetate 55 meq/Magnesium Sulfate 20 meq/ Calcium Gluconate 10 meq/ 

Multivitamins 10 ml/Chromium/ Copper/Manganese/ Seleni/Zn 0.5 ml/ Insulin Human 

Regular 30 unit/ Total Parenteral Nutrition/Amino Acids/Dextrose/ Fat Emulsion 

Intravenous 1,920 ml @  80 mls/hr TPN  CONT IV  Last administered on 5/1/20at 

01:00;  Start 4/30/20 at 22:00;  Stop 5/1/20 at 21:59;  Status DC


Magnesium Sulfate 50 ml @ 25 mls/hr 1X  ONCE IV  Last administered on 4/30/20at 

17:18;  Start 4/30/20 at 12:45;  Stop 4/30/20 at 14:44;  Status DC


Potassium Chloride/Water 100 ml @  100 mls/hr 1X  ONCE IV  Last administered on 

5/1/20at 11:27;  Start 5/1/20 at 12:00;  Stop 5/1/20 at 12:59;  Status DC


Hydromorphone HCl (Dilaudid Standard PCA) 12 mg STK-MED ONCE IV ;  Start 4/29/20

at 10:50;  Stop 5/1/20 at 11:02;  Status DC


Hydromorphone HCl (Dilaudid Standard PCA) 12 mg STK-MED ONCE IV ;  Start 4/30/20

at 13:47;  Stop 5/1/20 at 11:03;  Status DC


Potassium Acetate 30 meq/Magnesium Sulfate 20 meq/ Calcium Gluconate 10 meq/ 

Multivitamins 10 ml/Chromium/ Copper/Manganese/ Seleni/Zn 0.5 ml/ Insulin Human 

Regular 30 unit/ Potassium Chloride 30 meq/ Total Parenteral Nutrition/Amino 

Acids/Dextrose/ Fat Emulsion Intravenous 1,920 ml @  80 mls/hr TPN  CONT IV  

Last administered on 5/1/20at 22:34;  Start 5/1/20 at 22:00;  Stop 5/2/20 at 

21:59;  Status DC


Potassium Chloride/Water 100 ml @  100 mls/hr Q1H IV  Last administered on 

5/2/20at 13:05;  Start 5/2/20 at 07:00;  Stop 5/2/20 at 10:59;  Status DC


Magnesium Sulfate 50 ml @ 25 mls/hr 1X  ONCE IV  Last administered on 5/2/20at 

10:34;  Start 5/2/20 at 10:30;  Stop 5/2/20 at 12:29;  Status DC


Potassium Chloride 75 meq/ Magnesium Sulfate 20 meq/Calcium Gluconate 10 meq/ 

Multivitamins 10 ml/Chromium/ Copper/Manganese/ Seleni/Zn 0.5 ml/ Insulin Human 

Regular 30 unit/ Total Parenteral Nutrition/Amino Acids/Dextrose/ Fat Emulsion 

Intravenous 1,920 ml @  80 mls/hr TPN  CONT IV  Last administered on 5/2/20at 

21:51;  Start 5/2/20 at 22:00;  Stop 5/3/20 at 22:00;  Status DC


Potassium Chloride 75 meq/ Magnesium Sulfate 20 meq/Calcium Gluconate 10 meq/ 

Multivitamins 10 ml/Chromium/ Copper/Manganese/ Seleni/Zn 0.5 ml/ Insulin Human 

Regular 25 unit/ Total Parenteral Nutrition/Amino Acids/Dextrose/ Fat Emulsion 

Intravenous 1,920 ml @  80 mls/hr TPN  CONT IV  Last administered on 5/3/20at 

22:04;  Start 5/3/20 at 22:00;  Stop 5/4/20 at 21:59;  Status DC


Hydromorphone HCl (Dilaudid) 0.4 mg PRN Q4HRS  PRN IVP PAIN Last administered on

5/4/20at 10:57;  Start 5/4/20 at 09:00;  Stop 5/4/20 at 18:59;  Status DC


Micafungin Sodium 100 mg/Dextrose 100 ml @  100 mls/hr Q24H IV  Last 

administered on 5/26/20at 12:17;  Start 5/4/20 at 11:00;  Stop 5/27/20 at 09:59;

 Status DC


Daptomycin 485 mg/ Sodium Chloride 50 ml @  100 mls/hr Q24H IV  Last 

administered on 5/11/20at 13:10;  Start 5/4/20 at 11:00;  Stop 5/12/20 at 07:44;

 Status DC


Potassium Chloride 75 meq/ Magnesium Sulfate 15 meq/Calcium Gluconate 8 meq/ 

Multivitamins 10 ml/Chromium/ Copper/Manganese/ Seleni/Zn 0.5 ml/ Insulin Human 

Regular 25 unit/ Total Parenteral Nutrition/Amino Acids/Dextrose/ Fat Emulsion 

Intravenous 1,920 ml @  80 mls/hr TPN  CONT IV  Last administered on 5/4/20at 

23:08;  Start 5/4/20 at 22:00;  Stop 5/5/20 at 21:59;  Status DC


Haloperidol Lactate (Haldol Inj) 3 mg 1X  ONCE IVP  Last administered on 

5/4/20at 14:37;  Start 5/4/20 at 14:30;  Stop 5/4/20 at 14:31;  Status DC


Hydromorphone HCl (Dilaudid) 1 mg PRN Q4HRS  PRN IVP PAIN Last administered on 5 /18/20at 06:25;  Start 5/4/20 at 19:00;  Stop 5/18/20 at 17:10;  Status DC


Potassium Chloride 75 meq/ Magnesium Sulfate 15 meq/Calcium Gluconate 8 meq/ 

Multivitamins 10 ml/Chromium/ Copper/Manganese/ Seleni/Zn 0.5 ml/ Insulin Human 

Regular 20 unit/ Total Parenteral Nutrition/Amino Acids/Dextrose/ Fat Emulsion 

Intravenous 1,920 ml @  80 mls/hr TPN  CONT IV  Last administered on 5/5/20at 

22:10;  Start 5/5/20 at 22:00;  Stop 5/6/20 at 21:59;  Status DC


Lidocaine HCl (Buffered Lidocaine 1%) 3 ml STK-MED ONCE .ROUTE ;  Start 5/6/20 

at 11:31;  Stop 5/6/20 at 11:31;  Status DC


Lidocaine HCl (Buffered Lidocaine 1%) 3 ml STK-MED ONCE .ROUTE ;  Start 5/6/20 

at 12:28;  Stop 5/6/20 at 12:29;  Status DC


Lidocaine HCl (Buffered Lidocaine 1%) 6 ml 1X  ONCE INJ  Last administered on 

5/6/20at 12:53;  Start 5/6/20 at 12:45;  Stop 5/6/20 at 12:46;  Status DC


Potassium Chloride 75 meq/ Magnesium Sulfate 15 meq/Calcium Gluconate 8 meq/ 

Multivitamins 10 ml/Chromium/ Copper/Manganese/ Seleni/Zn 0.5 ml/ Insulin Human 

Regular 20 unit/ Total Parenteral Nutrition/Amino Acids/Dextrose/ Fat Emulsion 

Intravenous 1,920 ml @  80 mls/hr TPN  CONT IV  Last administered on 5/6/20at 

22:00;  Start 5/6/20 at 22:00;  Stop 5/7/20 at 21:59;  Status DC


Potassium Chloride 75 meq/ Magnesium Sulfate 15 meq/Calcium Gluconate 8 meq/ 

Multivitamins 10 ml/Chromium/ Copper/Manganese/ Seleni/Zn 0.5 ml/ Insulin Human 

Regular 15 unit/ Total Parenteral Nutrition/Amino Acids/Dextrose/ Fat Emulsion 

Intravenous 1,920 ml @  80 mls/hr TPN  CONT IV  Last administered on 5/7/20at 

22:28;  Start 5/7/20 at 22:00;  Stop 5/8/20 at 21:59;  Status DC


Vecuronium Bromide (Norcuron Bolus) 6 mg PRN Q6HRS  PRN IV VENT ASYNCHRONY;  

Start 5/7/20 at 19:15;  Stop 5/7/20 at 19:35;  Status DC


Bumetanide (Bumex) 2 mg 1X  ONCE IV  Last administered on 5/7/20at 22:09;  Start

5/7/20 at 19:45;  Stop 5/7/20 at 19:46;  Status DC


Lidocaine HCl (Buffered Lidocaine 1%) 3 ml STK-MED ONCE .ROUTE ;  Start 5/8/20 

at 07:59;  Stop 5/8/20 at 07:59;  Status DC


Midazolam HCl (Versed) 5 mg STK-MED ONCE .ROUTE ;  Start 5/8/20 at 08:36;  Stop 

5/8/20 at 08:36;  Status DC


Fentanyl Citrate (Fentanyl 5ml Vial) 250 mcg STK-MED ONCE .ROUTE ;  Start 5/8/20

at 08:36;  Stop 5/8/20 at 08:37;  Status DC


Lidocaine HCl (Buffered Lidocaine 1%) 3 ml 1X  ONCE IJ  Last administered on 

5/8/20at 09:30;  Start 5/8/20 at 09:15;  Stop 5/8/20 at 09:16;  Status DC


Midazolam HCl (Versed) 5 mg 1X  ONCE IV  Last administered on 5/8/20at 09:30;  

Start 5/8/20 at 09:15;  Stop 5/8/20 at 09:16;  Status DC


Fentanyl Citrate (Fentanyl 5ml Vial) 250 mcg 1X  ONCE IV  Last administered on 

5/8/20at 09:30;  Start 5/8/20 at 09:15;  Stop 5/8/20 at 09:16;  Status DC


Bumetanide (Bumex) 2 mg DAILY IV  Last administered on 5/18/20at 08:07;  Start 

5/8/20 at 10:00;  Stop 5/18/20 at 17:15;  Status DC


Potassium Chloride 75 meq/ Magnesium Sulfate 15 meq/ Multivitamins 10 

ml/Chromium/ Copper/Manganese/ Seleni/Zn 0.5 ml/ Insulin Human Regular 15 unit/ 

Total Parenteral Nutrition/Amino Acids/Dextrose/ Fat Emulsion Intravenous 1,920 

ml @  80 mls/hr TPN  CONT IV  Last administered on 5/8/20at 21:59;  Start 5/8/20

at 22:00;  Stop 5/9/20 at 21:59;  Status DC


Metoclopramide HCl (Reglan Vial) 10 mg PRN Q3HRS  PRN IVP NAUSEA/VOMITING-3rd 

choice Last administered on 5/14/20at 04:25;  Start 5/9/20 at 16:45


Potassium Chloride 75 meq/ Magnesium Sulfate 15 meq/ Multivitamins 10 

ml/Chromium/ Copper/Manganese/ Seleni/Zn 0.5 ml/ Insulin Human Regular 15 unit/ 

Total Parenteral Nutrition/Amino Acids/Dextrose/ Fat Emulsion Intravenous 1,920 

ml @  80 mls/hr TPN  CONT IV  Last administered on 5/9/20at 22:41;  Start 5/9/20

at 22:00;  Stop 5/10/20 at 21:59;  Status DC


Magnesium Sulfate 50 ml @ 25 mls/hr 1X  ONCE IV  Last administered on 5/10/20at 

10:44;  Start 5/10/20 at 09:00;  Stop 5/10/20 at 10:59;  Status DC


Potassium Chloride/Water 100 ml @  100 mls/hr 1X  ONCE IV  Last administered on 

5/10/20at 09:37;  Start 5/10/20 at 09:00;  Stop 5/10/20 at 09:59;  Status DC


Duloxetine HCl (Cymbalta) 30 mg DAILY PO  Last administered on 5/11/20at 09:48; 

Start 5/10/20 at 14:00;  Stop 5/13/20 at 10:25;  Status DC


Potassium Chloride 80 meq/ Magnesium Sulfate 20 meq/ Multivitamins 10 

ml/Chromium/ Copper/Manganese/ Seleni/Zn 0.5 ml/ Insulin Human Regular 15 unit/ 

Total Parenteral Nutrition/Amino Acids/Dextrose/ Fat Emulsion Intravenous 1,920 

ml @  80 mls/hr TPN  CONT IV  Last administered on 5/10/20at 21:42;  Start 

5/10/20 at 22:00;  Stop 5/11/20 at 21:59;  Status DC


Potassium Chloride 80 meq/ Magnesium Sulfate 20 meq/ Multivitamins 10 

ml/Chromium/ Copper/Manganese/ Seleni/Zn 0.5 ml/ Insulin Human Regular 15 unit/ 

Total Parenteral Nutrition/Amino Acids/Dextrose/ Fat Emulsion Intravenous 1,920 

ml @  80 mls/hr TPN  CONT IV  Last administered on 5/11/20at 22:20;  Start 

5/11/20 at 22:00;  Stop 5/12/20 at 21:59;  Status DC


Lidocaine HCl (Buffered Lidocaine 1%) 3 ml STK-MED ONCE .ROUTE ;  Start 5/12/20 

at 09:54;  Stop 5/12/20 at 09:55;  Status DC


Hydromorphone HCl (Dilaudid Standard PCA) 12 mg STK-MED ONCE IV ;  Start 5/1/20 

at 15:50;  Stop 5/12/20 at 11:24;  Status DC


Potassium Chloride 80 meq/ Magnesium Sulfate 20 meq/ Multivitamins 10 

ml/Chromium/ Copper/Manganese/ Seleni/Zn 0.5 ml/ Insulin Human Regular 15 unit/ 

Total Parenteral Nutrition/Amino Acids/Dextrose/ Fat Emulsion Intravenous 1,920 

ml @  80 mls/hr TPN  CONT IV  Last administered on 5/12/20at 21:40;  Start 5 /12/20 at 22:00;  Stop 5/13/20 at 21:59;  Status DC


Lidocaine HCl (Buffered Lidocaine 1%) 6 ml 1X  ONCE INJ  Last administered on 

5/12/20at 14:15;  Start 5/12/20 at 14:15;  Stop 5/12/20 at 14:16;  Status DC


Potassium Chloride 80 meq/ Magnesium Sulfate 20 meq/ Multivitamins 10 

ml/Chromium/ Copper/Manganese/ Seleni/Zn 1 ml/ Insulin Human Regular 15 unit/ 

Total Parenteral Nutrition/Amino Acids/Dextrose/ Fat Emulsion Intravenous 1,920 

ml @  80 mls/hr TPN  CONT IV  Last administered on 5/13/20at 22:04;  Start 5/ 13/20 at 22:00;  Stop 5/14/20 at 21:59;  Status DC


Potassium Chloride/Water 100 ml @  100 mls/hr 1X  ONCE IV  Last administered on 

5/14/20at 11:34;  Start 5/14/20 at 11:00;  Stop 5/14/20 at 11:59;  Status DC


Potassium Chloride 90 meq/ Magnesium Sulfate 20 meq/ Multivitamins 10 

ml/Chromium/ Copper/Manganese/ Seleni/Zn 1 ml/ Insulin Human Regular 15 unit/ 

Total Parenteral Nutrition/Amino Acids/Dextrose/ Fat Emulsion Intravenous 1,920 

ml @  80 mls/hr TPN  CONT IV  Last administered on 5/14/20at 22:57;  Start 5 /14/20 at 22:00;  Stop 5/15/20 at 21:59;  Status DC


Potassium Chloride 90 meq/ Magnesium Sulfate 20 meq/ Multivitamins 10 ml/Chr

omium/ Copper/Manganese/ Seleni/Zn 1 ml/ Insulin Human Regular 15 unit/ Total 

Parenteral Nutrition/Amino Acids/Dextrose/ Fat Emulsion Intravenous 1,920 ml @  

80 mls/hr TPN  CONT IV  Last administered on 5/15/20at 22:48;  Start 5/15/20 at 

22:00;  Stop 5/16/20 at 21:59;  Status DC


Potassium Chloride 90 meq/ Magnesium Sulfate 20 meq/ Multivitamins 10 

ml/Chromium/ Copper/Manganese/ Seleni/Zn 1 ml/ Insulin Human Regular 15 unit/ 

Total Parenteral Nutrition/Amino Acids/Dextrose/ Fat Emulsion Intravenous 1,890 

ml @  78.75 mls/ hr TPN  CONT IV  Last administered on 5/16/20at 22:15;  Start 

5/16/20 at 22:00;  Stop 5/17/20 at 21:59;  Status DC


Linezolid/Dextrose 300 ml @  300 mls/hr Q12HR IV  Last administered on 5/19/20at

21:08;  Start 5/17/20 at 09:00;  Stop 5/20/20 at 08:11;  Status DC


Daptomycin 450 mg/ Sodium Chloride 50 ml @  100 mls/hr Q24H IV  Last 

administered on 5/20/20at 09:25;  Start 5/17/20 at 09:00;  Stop 5/21/20 at 

08:30;  Status DC


Potassium Chloride 90 meq/ Magnesium Sulfate 20 meq/ Multivitamins 10 ml/C

hromium/ Copper/Manganese/ Seleni/Zn 1 ml/ Insulin Human Regular 15 unit/ Total 

Parenteral Nutrition/Amino Acids/Dextrose/ Fat Emulsion Intravenous 1,890 ml @  

78.75 mls/ hr TPN  CONT IV  Last administered on 5/17/20at 21:34;  Start 5/17/20

at 22:00;  Stop 5/18/20 at 21:59;  Status DC


Lorazepam (Ativan Inj) 2 mg STK-MED ONCE .ROUTE ;  Start 5/17/20 at 14:58;  Stop

5/17/20 at 14:58;  Status DC


Metoprolol Tartrate (Lopressor Vial) 5 mg 1X  ONCE IVP  Last administered on 

5/17/20at 15:31;  Start 5/17/20 at 15:15;  Stop 5/17/20 at 15:16;  Status DC


Lorazepam (Ativan Inj) 2 mg 1X  ONCE IVP  Last administered on 5/17/20at 15:30; 

Start 5/17/20 at 15:15;  Stop 5/17/20 at 15:16;  Status DC


Enoxaparin Sodium (Lovenox 40mg Syringe) 40 mg Q24H SQ  Last administered on 6 /5/20at 17:44;  Start 5/17/20 at 17:00;  Stop 6/7/20 at 06:50;  Status DC


Lorazepam (Ativan Inj) 1 mg PRN Q4HRS  PRN IVP ANXIETY / AGITATION MILD-MOD Last

administered on 5/31/20at 15:55;  Start 5/17/20 at 19:15;  Stop 6/2/20 at 11:45;

 Status DC


Lorazepam (Ativan Inj) 2 mg PRN Q4HRS  PRN IVP ANXIETY / AGITATION SEVERE Last 

administered on 6/1/20at 07:55;  Start 5/17/20 at 19:15;  Stop 6/2/20 at 11:45; 

Status DC


Fentanyl Citrate (Fentanyl 2ml Vial) 50 mcg PRN Q4HRS  PRN IVP SEVERE PAIN Last 

administered on 6/13/20at 05:15;  Start 5/18/20 at 13:15;  Stop 6/14/20 at 

09:29;  Status DC


Fentanyl Citrate (Fentanyl 2ml Vial) 25 mcg PRN Q4HRS  PRN IVP MODERATE PAIN 

Last administered on 6/13/20at 00:27;  Start 5/18/20 at 13:15;  Stop 6/14/20 at 

09:30;  Status DC


Potassium Chloride 90 meq/ Magnesium Sulfate 20 meq/ Multivitamins 10 

ml/Chromium/ Copper/Manganese/ Seleni/Zn 1 ml/ Insulin Human Regular 15 unit/ 

Total Parenteral Nutrition/Amino Acids/Dextrose/ Fat Emulsion Intravenous 1,890 

ml @  78.75 mls/ hr TPN  CONT IV  Last administered on 5/18/20at 22:18;  Start 

5/18/20 at 22:00;  Stop 5/19/20 at 21:59;  Status DC


Furosemide (Lasix) 40 mg 1X  ONCE IVP  Last administered on 5/18/20at 21:51;  

Start 5/18/20 at 21:45;  Stop 5/18/20 at 21:48;  Status DC


Albumin Human 100 ml @  100 mls/hr 1X PRN  PRN IV SEE COMMENTS;  Start 5/19/20 

at 01:30


Furosemide (Lasix) 40 mg [id number] IVP  Last administered on 6/3/20at 08:04;  Start 

5/19/20 at 14:00;  Stop 6/3/20 at 13:07;  Status DC


Potassium Chloride 90 meq/ Magnesium Sulfate 20 meq/ Multivitamins 10 

ml/Chromium/ Copper/Manganese/ Seleni/Zn 1 ml/ Insulin Human Regular 15 unit/ 

Total Parenteral Nutrition/Amino Acids/Dextrose/ Fat Emulsion Intravenous 1,800 

ml @  75 mls/hr TPN  CONT IV  Last administered on 5/19/20at 22:31;  Start 

5/19/20 at 22:00;  Stop 5/20/20 at 21:59;  Status DC


Potassium Chloride 90 meq/ Magnesium Sulfate 20 meq/ Multivitamins 10 

ml/Chromium/ Copper/Manganese/ Seleni/Zn 1 ml/ Insulin Human Regular 15 unit/ 

Total Parenteral Nutrition/Amino Acids/Dextrose/ Fat Emulsion Intravenous 1,800 

ml @  75 mls/hr TPN  CONT IV  Last administered on 5/20/20at 22:28;  Start 

5/20/20 at 22:00;  Stop 5/21/20 at 21:59;  Status DC


Potassium Chloride 110 meq/ Magnesium Sulfate 20 meq/ Multivitamins 10 

ml/Chromium/ Copper/Manganese/ Seleni/Zn 1 ml/ Insulin Human Regular 15 unit/ 

Total Parenteral Nutrition/Amino Acids/Dextrose/ Fat Emulsion Intravenous 1,800 

ml @  75 mls/hr TPN  CONT IV  Last administered on 5/21/20at 22:01;  Start 

5/21/20 at 22:00;  Stop 5/22/20 at 21:59;  Status DC


Saliva Substitute (Biotene Moisturizing Mouth) 2 spray PRN Q15MIN  PRN PO DRY 

MOUTH;  Start 5/21/20 at 11:00


Potassium Chloride 110 meq/ Magnesium Sulfate 20 meq/ Multivitamins 10 

ml/Chromium/ Copper/Manganese/ Seleni/Zn 1 ml/ Insulin Human Regular 15 unit/ 

Total Parenteral Nutrition/Amino Acids/Dextrose/ Fat Emulsion Intravenous 1,800 

ml @  75 mls/hr TPN  CONT IV  Last administered on 5/22/20at 22:21;  Start 

5/22/20 at 22:00;  Stop 5/23/20 at 21:59;  Status DC


Potassium Chloride 110 meq/ Magnesium Sulfate 20 meq/ Multivitamins 10 

ml/Chromium/ Copper/Manganese/ Seleni/Zn 1 ml/ Insulin Human Regular 15 unit/ 

Total Parenteral Nutrition/Amino Acids/Dextrose/ Fat Emulsion Intravenous 1,800 

ml @  75 mls/hr TPN  CONT IV  Last administered on 5/23/20at 22:04;  Start 

5/23/20 at 22:00;  Stop 5/24/20 at 21:59;  Status DC


Potassium Chloride 110 meq/ Magnesium Sulfate 20 meq/ Multivitamins 10 

ml/Chromium/ Copper/Manganese/ Seleni/Zn 1 ml/ Insulin Human Regular 15 unit/ 

Total Parenteral Nutrition/Amino Acids/Dextrose/ Fat Emulsion Intravenous 1,800 

ml @  75 mls/hr TPN  CONT IV  Last administered on 5/24/20at 22:48;  Start 

5/24/20 at 22:00;  Stop 5/25/20 at 21:59;  Status DC


Potassium Chloride 70 meq/ Magnesium Sulfate 20 meq/ Multivitamins 10 

ml/Chromium/ Copper/Manganese/ Seleni/Zn 1 ml/ Insulin Human Regular 15 unit/ 

Total Parenteral Nutrition/Amino Acids/Dextrose/ Fat Emulsion Intravenous 1,800 

ml @  75 mls/hr TPN  CONT IV  Last administered on 5/25/20at 21:39;  Start 

5/25/20 at 22:00;  Stop 5/26/20 at 21:59;  Status DC


Meropenem 500 mg/ Sodium Chloride 50 ml @  100 mls/hr Q6HRS IV  Last 

administered on 5/27/20at 06:02;  Start 5/25/20 at 18:00;  Stop 5/27/20 at 

09:59;  Status DC


Barium Sulfate (Varibar Thin Liquid Apple) 148 gm 1X  ONCE PO ;  Start 5/26/20 

at 11:45;  Stop 5/26/20 at 11:49;  Status DC


Potassium Chloride 70 meq/ Magnesium Sulfate 20 meq/ Multivitamins 10 

ml/Chromium/ Copper/Manganese/ Seleni/Zn 1 ml/ Insulin Human Regular 15 unit/ 

Total Parenteral Nutrition/Amino Acids/Dextrose/ Fat Emulsion Intravenous 1,800 

ml @  75 mls/hr TPN  CONT IV  Last administered on 5/26/20at 22:27;  Start 

5/26/20 at 22:00;  Stop 5/27/20 at 21:59;  Status DC


Piperacillin Sod/ Tazobactam Sod 3.375 gm/Sodium Chloride 50 ml @  100 mls/hr 

Q6HRS IV  Last administered on 6/4/20at 06:10;  Start 5/27/20 at 12:00;  Stop 

6/4/20 at 07:26;  Status DC


Potassium Chloride 70 meq/ Magnesium Sulfate 20 meq/ Multivitamins 10 ml/Chromi

um/ Copper/Manganese/ Seleni/Zn 1 ml/ Insulin Human Regular 15 unit/ Total 

Parenteral Nutrition/Amino Acids/Dextrose/ Fat Emulsion Intravenous 1,800 ml @  

75 mls/hr TPN  CONT IV  Last administered on 5/27/20at 22:03;  Start 5/27/20 at 

22:00;  Stop 5/28/20 at 21:59;  Status DC


Potassium Chloride 70 meq/ Magnesium Sulfate 20 meq/ Multivitamins 10 

ml/Chromium/ Copper/Manganese/ Seleni/Zn 1 ml/ Insulin Human Regular 15 unit/ 

Total Parenteral Nutrition/Amino Acids/Dextrose/ Fat Emulsion Intravenous 1,800 

ml @  75 mls/hr TPN  CONT IV  Last administered on 5/28/20at 22:33;  Start 

5/28/20 at 22:00;  Stop 5/29/20 at 21:59;  Status DC


Potassium Chloride 70 meq/ Magnesium Sulfate 20 meq/ Multivitamins 10 

ml/Chromium/ Copper/Manganese/ Seleni/Zn 1 ml/ Insulin Human Regular 15 unit/ 

Total Parenteral Nutrition/Amino Acids/Dextrose/ Fat Emulsion Intravenous 1,800 

ml @  75 mls/hr TPN  CONT IV  Last administered on 5/29/20at 23:13;  Start 

5/29/20 at 22:00;  Stop 5/30/20 at 21:59;  Status DC


Potassium Chloride 80 meq/ Magnesium Sulfate 20 meq/ Multivitamins 10 

ml/Chromium/ Copper/Manganese/ Seleni/Zn 1 ml/ Insulin Human Regular 15 unit/ 

Total Parenteral Nutrition/Amino Acids/Dextrose/ Fat Emulsion Intravenous 1,800 

ml @  75 mls/hr TPN  CONT IV  Last administered on 5/30/20at 22:30;  Start 

5/30/20 at 22:00;  Stop 5/31/20 at 21:59;  Status DC


Potassium Chloride 80 meq/ Magnesium Sulfate 20 meq/ Multivitamins 10 

ml/Chromium/ Copper/Manganese/ Seleni/Zn 1 ml/ Insulin Human Regular 15 unit/ 

Total Parenteral Nutrition/Amino Acids/Dextrose/ Fat Emulsion Intravenous 1,800 

ml @  75 mls/hr TPN  CONT IV  Last administered on 5/31/20at 21:54;  Start 

5/31/20 at 22:00;  Stop 6/1/20 at 21:59;  Status DC


Potassium Chloride/Water 100 ml @  100 mls/hr 1X  ONCE IV  Last administered on 

6/1/20at 10:15;  Start 6/1/20 at 10:00;  Stop 6/1/20 at 10:59;  Status DC


Potassium Chloride 90 meq/ Magnesium Sulfate 20 meq/ Multivitamins 10 

ml/Chromium/ Copper/Manganese/ Seleni/Zn 1 ml/ Insulin Human Regular 20 unit/ 

Total Parenteral Nutrition/Amino Acids/Dextrose/ Fat Emulsion Intravenous 1,800 

ml @  75 mls/hr TPN  CONT IV  Last administered on 6/1/20at 22:28;  Start 6/1/20

at 22:00;  Stop 6/2/20 at 21:59;  Status DC


Potassium Chloride 90 meq/ Magnesium Sulfate 20 meq/ Multivitamins 10 

ml/Chromium/ Copper/Manganese/ Seleni/Zn 1 ml/ Insulin Human Regular 20 unit/ 

Total Parenteral Nutrition/Amino Acids/Dextrose/ Fat Emulsion Intravenous 1,800 

ml @  75 mls/hr TPN  CONT IV  Last administered on 6/2/20at 22:08;  Start 6/2/20

at 22:00;  Stop 6/3/20 at 21:59;  Status DC


Lorazepam (Ativan Inj) 0.25 mg PRN Q4HRS  PRN IVP ANXIETY / AGITATION Last 

administered on 6/27/20at 13:37;  Start 6/3/20 at 07:30


Potassium Chloride 90 meq/ Magnesium Sulfate 20 meq/ Multivitamins 10 

ml/Chromium/ Copper/Manganese/ Seleni/Zn 1 ml/ Insulin Human Regular 20 unit/ 

Total Parenteral Nutrition/Amino Acids/Dextrose/ Fat Emulsion Intravenous 1,800 

ml @  75 mls/hr TPN  CONT IV  Last administered on 6/3/20at 23:13;  Start 6/3/20

at 22:00;  Stop 6/4/20 at 21:59;  Status DC


Furosemide (Lasix) 40 mg DAILY IVP  Last administered on 6/5/20at 11:14;  Start 

6/3/20 at 13:30;  Stop 6/7/20 at 09:12;  Status DC


Fluoxetine HCl (PROzac) 20 mg QHS PEG  Last administered on 7/2/20at 21:51;  

Start 6/4/20 at 21:00


Fentanyl (Duragesic 50mcg/ Hr Patch) 1 patch Q72H TD  Last administered on 

6/4/20at 21:22;  Start 6/4/20 at 21:00;  Stop 6/13/20 at 12:00;  Status DC


Potassium Chloride 40 meq/ Potassium Acetate 60 meq/Magnesium Sulfate 10 meq/ 

Multivitamins 10 ml/Chromium/ Copper/Manganese/ Seleni/Zn 1 ml/ Insulin Human 

Regular 20 unit/ Total Parenteral Nutrition/Amino Acids/Dextrose/ Fat Emulsion 

Intravenous 1,800 ml @  75 mls/hr TPN  CONT IV  Last administered on 6/5/20at 

00:03;  Start 6/4/20 at 22:00;  Stop 6/5/20 at 21:59;  Status DC


Potassium Acetate 80 meq/Magnesium Sulfate 5 meq/ Multivitamins 10 ml/Chromium/ 

Copper/Manganese/ Seleni/Zn 1 ml/ Insulin Human Regular 20 unit/ Total 

Parenteral Nutrition/Amino Acids/Dextrose/ Fat Emulsion Intravenous 1,920 ml @  

80 mls/hr TPN  CONT IV  Last administered on 6/5/20at 21:59;  Start 6/5/20 at 

22:00;  Stop 6/6/20 at 21:59;  Status DC


Potassium Acetate 60 meq/Magnesium Sulfate 5 meq/ Multivitamins 10 ml/Chromium/ 

Copper/Manganese/ Seleni/Zn 1 ml/ Insulin Human Regular 30 unit/ Total 

Parenteral Nutrition/Amino Acids/Dextrose/ Fat Emulsion Intravenous 1,920 ml @  

80 mls/hr TPN  CONT IV  Last administered on 6/6/20at 21:54;  Start 6/6/20 at 

22:00;  Stop 6/7/20 at 21:59;  Status DC


Norepinephrine Bitartrate 8 mg/ Dextrose 258 ml @  13.332 mls/ hr CONT  PRN IV 

PER PROTOCOL Last administered on 7/2/20at 09:09;  Start 6/7/20 at 06:30


Albumin Human 500 ml @  125 mls/hr 1X  ONCE IV  Last administered on 6/7/20at 

08:10;  Start 6/7/20 at 08:15;  Stop 6/7/20 at 12:14;  Status DC


Potassium Acetate 40 meq/Magnesium Sulfate 5 meq/ Multivitamins 10 ml/Chromium/ 

Copper/Manganese/ Seleni/Zn 1 ml/ Insulin Human Regular 30 unit/ Total 

Parenteral Nutrition/Amino Acids/Dextrose/ Fat Emulsion Intravenous 1,920 ml @  

80 mls/hr TPN  CONT IV  Last administered on 6/7/20at 22:23;  Start 6/7/20 at 

22:00;  Stop 6/8/20 at 21:59;  Status DC


Meropenem 1 gm/ Sodium Chloride 100 ml @  200 mls/hr Q8HRS IV ;  Start 6/7/20 at

14:00;  Status Cancel


Meropenem 1 gm/ Sodium Chloride 100 ml @  200 mls/hr Q8HRS IV  Last administered

on 6/7/20at 11:04;  Start 6/7/20 at 10:00;  Stop 6/7/20 at 13:00;  Status DC


Meropenem 1 gm/ Sodium Chloride 100 ml @  200 mls/hr Q12HR IV  Last administered

on 6/25/20at 08:27;  Start 6/7/20 at 21:00;  Stop 6/25/20 at 08:56;  Status DC


Sodium Chloride 1,000 ml @  1,000 mls/hr 1X  ONCE IV  Last administered on 

6/7/20at 11:06;  Start 6/7/20 at 10:45;  Stop 6/7/20 at 11:44;  Status DC


Micafungin Sodium 100 mg/Dextrose 100 ml @  100 mls/hr Q24H IV  Last 

administered on 6/24/20at 12:34;  Start 6/7/20 at 11:00;  Stop 6/25/20 at 08:56;

 Status DC


Daptomycin 410 mg/ Sodium Chloride 50 ml @  100 mls/hr Q24H IV  Last 

administered on 6/9/20at 13:33;  Start 6/7/20 at 14:00;  Stop 6/10/20 at 08:30; 

Status DC


Midazolam HCl (Versed) 2 mg STK-MED ONCE .ROUTE ;  Start 6/7/20 at 14:47;  Stop 

6/7/20 at 14:48;  Status DC


Fentanyl Citrate (Fentanyl 2ml Vial) 100 mcg STK-MED ONCE .ROUTE ;  Start 6/7/20

at 14:47;  Stop 6/7/20 at 14:48;  Status DC


Flumazenil (Romazicon) 0.5 mg STK-MED ONCE IV ;  Start 6/7/20 at 14:48;  Stop 

6/7/20 at 14:48;  Status DC


Naloxone HCl (Narcan) 0.4 mg STK-MED ONCE .ROUTE ;  Start 6/7/20 at 14:48;  Stop

6/7/20 at 14:48;  Status DC


Lidocaine HCl (Lidocaine 1% 20ml Vial) 20 ml STK-MED ONCE .ROUTE ;  Start 6/7/20

at 14:48;  Stop 6/7/20 at 14:48;  Status DC


Midazolam HCl (Versed) 2 mg 1X  ONCE IV  Last administered on 6/7/20at 15:28;  

Start 6/7/20 at 15:00;  Stop 6/7/20 at 15:01;  Status DC


Fentanyl Citrate (Fentanyl 2ml Vial) 100 mcg 1X  ONCE IV  Last administered on 

6/7/20at 15:28;  Start 6/7/20 at 15:00;  Stop 6/7/20 at 15:01;  Status DC


Lidocaine HCl (Lidocaine 1% 20ml Vial) 20 ml 1X  ONCE INJ  Last administered on 

6/7/20at 15:30;  Start 6/7/20 at 15:00;  Stop 6/7/20 at 15:01;  Status DC


Sodium Chloride 1,000 ml @  100 mls/hr Q10H IV  Last administered on 6/16/20at 

07:30;  Start 6/7/20 at 20:00;  Stop 6/16/20 at 11:26;  Status DC


Sodium Bicarbonate (Sodium Bicarb Adult 8.4% Syr) 50 meq 1X  ONCE IV  Last 

administered on 6/7/20at 21:47;  Start 6/7/20 at 22:00;  Stop 6/7/20 at 22:01;  

Status DC


Potassium Acetate 40 meq/Magnesium Sulfate 5 meq/ Multivitamins 10 ml/Chromium/ 

Copper/Manganese/ Seleni/Zn 1 ml/ Insulin Human Regular 30 unit/ Total 

Parenteral Nutrition/Amino Acids/Dextrose/ Fat Emulsion Intravenous 1,920 ml @  

80 mls/hr TPN  CONT IV  Last administered on 6/8/20at 22:28;  Start 6/8/20 at 

22:00;  Stop 6/9/20 at 21:59;  Status DC


Sodium Chloride 500 ml @  500 mls/hr 1X  ONCE IV  Last administered on 6/9/20at 

06:39;  Start 6/9/20 at 06:45;  Stop 6/9/20 at 07:44;  Status DC


Potassium Acetate 40 meq/Magnesium Sulfate 5 meq/ Multivitamins 10 ml/Chromium/ 

Copper/Manganese/ Seleni/Zn 1 ml/ Insulin Human Regular 30 unit/ Total 

Parenteral Nutrition/Amino Acids/Dextrose/ Fat Emulsion Intravenous 1,920 ml @  

80 mls/hr TPN  CONT IV  Last administered on 6/9/20at 22:03;  Start 6/9/20 at 

22:00;  Stop 6/10/20 at 21:59;  Status DC


Metoprolol Tartrate (Lopressor Vial) 5 mg PRN Q6HRS  PRN IVP HYPERTENSION Last 

administered on 6/18/20at 10:14;  Start 6/10/20 at 09:00


Potassium Acetate 40 meq/Magnesium Sulfate 5 meq/ Multivitamins 10 ml/Chromium/ 

Copper/Manganese/ Seleni/Zn 1 ml/ Insulin Human Regular 30 unit/ Total 

Parenteral Nutrition/Amino Acids/Dextrose/ Fat Emulsion Intravenous 1,920 ml @  

80 mls/hr TPN  CONT IV  Last administered on 6/10/20at 21:26;  Start 6/10/20 at 

22:00;  Stop 6/11/20 at 21:59;  Status DC


Potassium Acetate 40 meq/Magnesium Sulfate 5 meq/ Multivitamins 10 ml/Chromium/ 

Copper/Manganese/ Seleni/Zn 1 ml/ Insulin Human Regular 30 unit/ Total 

Parenteral Nutrition/Amino Acids/Dextrose/ Fat Emulsion Intravenous 1,920 ml @  

80 mls/hr TPN  CONT IV  Last administered on 6/11/20at 23:23;  Start 6/11/20 at 

22:00;  Stop 6/12/20 at 21:59;  Status DC


Potassium Acetate 40 meq/Magnesium Sulfate 5 meq/ Multivitamins 10 ml/Chromium/ 

Copper/Manganese/ Seleni/Zn 1 ml/ Insulin Human Regular 30 unit/ Total 

Parenteral Nutrition/Amino Acids/Dextrose/ Fat Emulsion Intravenous 1,920 ml @  

80 mls/hr TPN  CONT IV  Last administered on 6/12/20at 21:35;  Start 6/12/20 at 

22:00;  Stop 6/13/20 at 21:59;  Status DC


Furosemide (Lasix) 20 mg 1X  ONCE IVP  Last administered on 6/13/20at 06:26;  

Start 6/13/20 at 06:15;  Stop 6/13/20 at 06:16;  Status DC


Methylprednisolone Sodium Succinate (SOLU-Medrol 125MG VIAL) 125 mg 1X  ONCE IV 

Last administered on 6/13/20at 06:26;  Start 6/13/20 at 06:15;  Stop 6/13/20 at 

06:16;  Status DC


Albuterol/ Ipratropium (Duoneb) 3 ml Q4HRS NEB  Last administered on 7/3/20at 

15:38;  Start 6/13/20 at 08:00


Fentanyl Citrate 30 ml @ 0 mls/hr CONT  PRN IV SEE PROTOCOL Last administered on

7/3/20at 13:55;  Start 6/13/20 at 06:00


Propofol 100 ml @ 0 mls/hr CONT  PRN IV SEE PROTOCOL Last administered on 

6/20/20at 23:50;  Start 6/13/20 at 06:00


Fentanyl Citrate (Fentanyl 2ml Vial) 25 mcg PRN Q1HR  PRN IV SEE COMMENTS;  

Start 6/13/20 at 06:00


Fentanyl Citrate (Fentanyl 2ml Vial) 50 mcg PRN Q1HR  PRN IV SEE COMMENTS;  

Start 6/13/20 at 06:00


Chlorhexidine Gluconate (Peridex) 15 ml BID MM ;  Start 6/13/20 at 09:00;  Stop 

6/13/20 at 07:58;  Status DC


Potassium Acetate 40 meq/Magnesium Sulfate 5 meq/ Multivitamins 10 ml/Chromium/ 

Copper/Manganese/ Seleni/Zn 1 ml/ Insulin Human Regular 30 unit/ Total 

Parenteral Nutrition/Amino Acids/Dextrose/ Fat Emulsion Intravenous 1,920 ml @  

80 mls/hr TPN  CONT IV  Last administered on 6/13/20at 21:19;  Start 6/13/20 at 

22:00;  Stop 6/14/20 at 21:59;  Status DC


Acetylcysteine (Mucomyst 20% Resp Treatment) 600 mg BID NEB  Last administered 

on 6/19/20at 09:33;  Start 6/13/20 at 21:00;  Stop 6/19/20 at 10:39;  Status DC


Magnesium Sulfate 100 ml @  25 mls/hr 1X  ONCE IV  Last administered on 

6/13/20at 15:48;  Start 6/13/20 at 15:45;  Stop 6/13/20 at 19:44;  Status DC


Potassium Acetate 40 meq/Magnesium Sulfate 5 meq/ Multivitamins 10 ml/Chromium/ 

Copper/Manganese/ Seleni/Zn 1 ml/ Insulin Human Regular 30 unit/ Total 

Parenteral Nutrition/Amino Acids/Dextrose/ Fat Emulsion Intravenous 1,920 ml @  

80 mls/hr TPN  CONT IV  Last administered on 6/14/20at 21:35;  Start 6/14/20 at 

22:00;  Stop 6/15/20 at 21:59;  Status DC


Potassium Chloride/Water 100 ml @  100 mls/hr Q1H IV  Last administered on 

6/15/20at 08:31;  Start 6/15/20 at 07:00;  Stop 6/15/20 at 08:59;  Status DC


Potassium Acetate 40 meq/Magnesium Sulfate 5 meq/ Multivitamins 10 ml/Chromium/ 

Copper/Manganese/ Seleni/Zn 1 ml/ Insulin Human Regular 30 unit/ Total 

Parenteral Nutrition/Amino Acids/Dextrose/ Fat Emulsion Intravenous 1,920 ml @  

80 mls/hr TPN  CONT IV  Last administered on 6/15/20at 21:54;  Start 6/15/20 at 

22:00;  Stop 6/16/20 at 19:34;  Status DC


Lidocaine HCl (Buffered Lidocaine 1%) 3 ml STK-MED ONCE .ROUTE ;  Start 6/15/20 

at 12:14;  Stop 6/15/20 at 12:14;  Status DC


Lidocaine HCl (Buffered Lidocaine 1%) 3 ml 1X  ONCE IJ  Last administered on 

6/15/20at 13:11;  Start 6/15/20 at 13:00;  Stop 6/15/20 at 13:01;  Status DC


Magnesium Sulfate 50 ml @ 25 mls/hr 1X  ONCE IV ;  Start 6/16/20 at 08:15;  Stop

6/16/20 at 10:14;  Status DC


Potassium Acetate 40 meq/Magnesium Sulfate 10 meq/ Multivitamins 10 ml/Chromium/

Copper/Manganese/ Seleni/Zn 1 ml/ Insulin Human Regular 20 unit/ Total 

Parenteral Nutrition/Amino Acids/Dextrose/ Fat Emulsion Intravenous 1,920 ml @  

80 mls/hr TPN  CONT IV  Last administered on 6/16/20at 21:32;  Start 6/16/20 at 

22:00;  Stop 6/17/20 at 21:59;  Status DC


Potassium Chloride/Water 100 ml @  100 mls/hr Q1H IV  Last administered on 

6/17/20at 09:12;  Start 6/17/20 at 08:00;  Stop 6/17/20 at 09:59;  Status DC


Alteplase, Recombinant (Cathflo For Central Catheter Clearance) 4 mg 1X  ONCE 

INT CAT ;  Start 6/17/20 at 09:15;  Stop 6/17/20 at 09:16;  Status UNV


Alteplase, Recombinant (Cathflo For Central Catheter Clearance) 4 mg 1X  ONCE 

INT CAT ;  Start 6/17/20 at 09:15;  Stop 6/17/20 at 09:16;  Status UNV


Alteplase, Recombinant (Cathflo For Central Catheter Clearance) 4 mg 1X  ONCE 

INT CAT ;  Start 6/17/20 at 09:15;  Stop 6/17/20 at 09:16;  Status UNV


Alteplase, Recombinant 4 mg/ Sodium Chloride 20 ml @ 20 mls/hr 1X  ONCE IV  Last

administered on 6/17/20at 10:10;  Start 6/17/20 at 10:00;  Stop 6/17/20 at 

10:59;  Status DC


Alteplase, Recombinant 4 mg/ Sodium Chloride 20 ml @ 20 mls/hr 1X  ONCE IV  Last

administered on 6/17/20at 10:09;  Start 6/17/20 at 10:00;  Stop 6/17/20 at 10:5

9;  Status DC


Alteplase, Recombinant 4 mg/ Sodium Chloride 20 ml @ 20 mls/hr 1X  ONCE IV  Last

administered on 6/17/20at 10:09;  Start 6/17/20 at 10:00;  Stop 6/17/20 at 

10:59;  Status DC


Potassium Acetate 60 meq/Magnesium Sulfate 10 meq/ Multivitamins 10 ml/Chromium/

Copper/Manganese/ Seleni/Zn 1 ml/ Insulin Human Regular 20 unit/ Total 

Parenteral Nutrition/Amino Acids/Dextrose/ Fat Emulsion Intravenous 1,920 ml @  

80 mls/hr TPN  CONT IV  Last administered on 6/17/20at 21:55;  Start 6/17/20 at 

22:00;  Stop 6/18/20 at 21:59;  Status DC


Albumin Human 500 ml @  125 mls/hr 1X  ONCE IV  Last administered on 6/18/20at 

12:01;  Start 6/18/20 at 11:15;  Stop 6/18/20 at 15:14;  Status DC


Sodium Chloride 500 ml @  500 mls/hr 1X  ONCE IV  Last administered on 6/18/20at

13:50;  Start 6/18/20 at 11:15;  Stop 6/18/20 at 12:14;  Status DC


Potassium Acetate 60 meq/Magnesium Sulfate 14 meq/ Multivitamins 10 ml/Chromium/

Copper/Manganese/ Seleni/Zn 1 ml/ Insulin Human Regular 20 unit/ Total 

Parenteral Nutrition/Amino Acids/Dextrose/ Fat Emulsion Intravenous 1,920 ml @  

80 mls/hr TPN  CONT IV  Last administered on 6/18/20at 22:26;  Start 6/18/20 at 

22:00;  Stop 6/19/20 at 21:59;  Status DC


Ciprofloxacin/ Dextrose 200 ml @  200 mls/hr Q12HR IV  Last administered on 

6/25/20at 08:27;  Start 6/18/20 at 21:00;  Stop 6/25/20 at 08:56;  Status DC


Albumin Human 250 ml @  62.5 mls/hr 1X  ONCE IV  Last administered on 6/19/20at 

11:09;  Start 6/19/20 at 11:00;  Stop 6/19/20 at 14:59;  Status DC


Furosemide (Lasix) 20 mg 1X  ONCE IVP  Last administered on 6/19/20at 14:52;  

Start 6/19/20 at 10:45;  Stop 6/19/20 at 10:49;  Status DC


Potassium Acetate 60 meq/Magnesium Sulfate 14 meq/ Multivitamins 10 ml/Chromium/

Copper/Manganese/ Seleni/Zn 1 ml/ Insulin Human Regular 15 unit/ Total 

Parenteral Nutrition/Amino Acids/Dextrose/ Fat Emulsion Intravenous 1,920 ml @  

80 mls/hr TPN  CONT IV  Last administered on 6/19/20at 22:08;  Start 6/19/20 at 

22:00;  Stop 6/20/20 at 21:59;  Status DC


Potassium Acetate 60 meq/Magnesium Sulfate 14 meq/ Multivitamins 10 ml/Chromium/

Copper/Manganese/ Seleni/Zn 1 ml/ Insulin Human Regular 15 unit/ Total 

Parenteral Nutrition/Amino Acids/Dextrose/ Fat Emulsion Intravenous 1,920 ml @  

80 mls/hr TPN  CONT IV  Last administered on 6/20/20at 22:12;  Start 6/20/20 at 

22:00;  Stop 6/21/20 at 21:59;  Status DC


Potassium Acetate 60 meq/Magnesium Sulfate 14 meq/ Multivitamins 10 ml/Chromium/

Copper/Manganese/ Seleni/Zn 1 ml/ Insulin Human Regular 15 unit/ Total 

Parenteral Nutrition/Amino Acids/Dextrose/ Fat Emulsion Intravenous 1,920 ml @  

80 mls/hr TPN  CONT IV  Last administered on 6/21/20at 22:22;  Start 6/21/20 at 

22:00;  Stop 6/22/20 at 21:59;  Status DC


Furosemide (Lasix) 20 mg 1X  ONCE IVP  Last administered on 6/22/20at 11:07;  

Start 6/22/20 at 10:30;  Stop 6/22/20 at 10:34;  Status DC


Potassium Acetate 60 meq/Magnesium Sulfate 14 meq/ Multivitamins 10 ml/Chromium/

Copper/Manganese/ Seleni/Zn 1 ml/ Insulin Human Regular 15 unit/ Sodium Chloride

20 meq/Total Parenteral Nutrition/Amino Acids/Dextrose/ Fat Emulsion Intravenous

1,920 ml @  80 mls/hr TPN  CONT IV  Last administered on 6/22/20at 21:54;  Start

6/22/20 at 22:00;  Stop 6/23/20 at 21:59;  Status DC


Potassium Acetate 30 meq/Magnesium Sulfate 14 meq/ Multivitamins 10 ml/Chromium/

Copper/Manganese/ Seleni/Zn 1 ml/ Insulin Human Regular 15 unit/ Sodium Chloride

20 meq/Potassium Chloride 30 meq/ Total Parenteral Nutrition/Amino 

Acids/Dextrose/ Fat Emulsion Intravenous 1,920 ml @  80 mls/hr TPN  CONT IV  

Last administered on 6/23/20at 21:46;  Start 6/23/20 at 22:00;  Stop 6/24/20 at 

21:59;  Status DC


Sodium Chloride 80 meq/Potassium Chloride 30 meq/ Potassium Acetate 30 

meq/Magnesium Sulfate 14 meq/ Multivitamins 10 ml/Chromium/ Copper/Manganese/ 

Seleni/Zn 1 ml/ Insulin Human Regular 15 unit/ Total Parenteral Nutrition/Amino 

Acids/Dextrose/ Fat Emulsion Intravenous 1,920 ml @  80 mls/hr TPN  CONT IV  

Last administered on 6/24/20at 22:33;  Start 6/24/20 at 22:00;  Stop 6/25/20 at 

21:59;  Status DC


Furosemide (Lasix) 40 mg 1X  ONCE IVP  Last administered on 6/24/20at 16:27;  

Start 6/24/20 at 15:30;  Stop 6/24/20 at 15:33;  Status DC


Albumin Human 250 ml @  62.5 mls/hr 1X  ONCE IV  Last administered on 6/24/20at 

16:27;  Start 6/24/20 at 15:30;  Stop 6/24/20 at 19:29;  Status DC


Sodium Chloride 80 meq/Potassium Chloride 30 meq/ Potassium Acetate 30 

meq/Magnesium Sulfate 14 meq/ Multivitamins 10 ml/Chromium/ Copper/Manganese/ 

Seleni/Zn 1 ml/ Insulin Human Regular 15 unit/ Total Parenteral Nutrition/Amino 

Acids/Dextrose/ Fat Emulsion Intravenous 1,920 ml @  80 mls/hr TPN  CONT IV  

Last administered on 6/25/20at 22:25;  Start 6/25/20 at 22:00;  Stop 6/26/20 at 

21:59;  Status DC


Sodium Chloride 80 meq/Potassium Chloride 30 meq/ Potassium Acetate 30 

meq/Magnesium Sulfate 14 meq/ Multivitamins 10 ml/Chromium/ Copper/Manganese/ 

Seleni/Zn 1 ml/ Insulin Human Regular 15 unit/ Total Parenteral Nutrition/Amino 

Acids/Dextrose/ Fat Emulsion Intravenous 1,920 ml @  80 mls/hr TPN  CONT IV  

Last administered on 6/26/20at 21:32;  Start 6/26/20 at 22:00;  Stop 6/27/20 at 

21:59;  Status DC


Sodium Chloride 80 meq/Potassium Chloride 30 meq/ Potassium Acetate 30 

meq/Magnesium Sulfate 14 meq/ Multivitamins 10 ml/Chromium/ Copper/Manganese/ 

Seleni/Zn 1 ml/ Insulin Human Regular 15 unit/ Total Parenteral Nutrition/Amino 

Acids/Dextrose/ Fat Emulsion Intravenous 1,920 ml @  80 mls/hr TPN  CONT IV  

Last administered on 6/27/20at 21:53;  Start 6/27/20 at 22:00;  Stop 6/28/20 at 

21:59;  Status DC


Acetylcysteine (Mucomyst 20% Resp Treatment) 600 mg RTBID NEB  Last administered

on 7/3/20at 08:00;  Start 6/27/20 at 12:00


Sodium Chloride 80 meq/Potassium Chloride 30 meq/ Potassium Acetate 30 

meq/Magnesium Sulfate 14 meq/ Multivitamins 10 ml/Chromium/ Copper/Manganese/ 

Seleni/Zn 1 ml/ Insulin Human Regular 15 unit/ Total Parenteral Nutrition/Amino 

Acids/Dextrose/ Fat Emulsion Intravenous 1,920 ml @  80 mls/hr TPN  CONT IV  

Last administered on 6/28/20at 22:06;  Start 6/28/20 at 22:00;  Stop 6/29/20 at 

21:59;  Status DC


Meropenem 500 mg/ Sodium Chloride 50 ml @  100 mls/hr Q6HRS IV  Last 

administered on 7/3/20at 12:27;  Start 6/28/20 at 18:00


Daptomycin 500 mg/ Sodium Chloride 50 ml @  100 mls/hr Q24H IV  Last 

administered on 7/2/20at 21:51;  Start 6/28/20 at 19:00


Sodium Chloride 80 meq/Potassium Chloride 30 meq/ Potassium Acetate 30 

meq/Magnesium Sulfate 14 meq/ Multivitamins 10 ml/Chromium/ Copper/Manganese/ 

Seleni/Zn 1 ml/ Insulin Human Regular 15 unit/ Total Parenteral Nutrition/Amino 

Acids/Dextrose/ Fat Emulsion Intravenous 1,920 ml @  80 mls/hr TPN  CONT IV  

Last administered on 6/29/20at 22:09;  Start 6/29/20 at 22:00;  Stop 6/30/20 at 

21:59;  Status DC


Heparin Sodium (Porcine) 1000 unit/Sodium Chloride 1,001 ml @  1,001 mls/hr 1X  

ONCE IRR ;  Start 6/30/20 at 06:00;  Stop 6/30/20 at 06:59;  Status DC


Propofol (Diprivan) 200 mg STK-MED ONCE IV ;  Start 6/30/20 at 07:44;  Stop 

6/30/20 at 07:44;  Status DC


Lidocaine HCl (Lidocaine Pf 2% Vial) 5 ml STK-MED ONCE .ROUTE ;  Start 6/30/20 

at 07:44;  Stop 6/30/20 at 07:44;  Status DC


Fentanyl Citrate (Fentanyl 2ml Vial) 100 mcg STK-MED ONCE .ROUTE ;  Start 

6/30/20 at 07:44;  Stop 6/30/20 at 07:44;  Status DC


Rocuronium Bromide (Zemuron) 100 mg STK-MED ONCE .ROUTE ;  Start 6/30/20 at 

07:44;  Stop 6/30/20 at 07:44;  Status DC


Micafungin Sodium 100 mg/Dextrose 100 ml @  100 mls/hr Q24H IV  Last 

administered on 7/3/20at 07:56;  Start 6/30/20 at 08:30


Bupivacaine HCl/ Epinephrine Bitart (Sensorcain-Epi 0.5%-1:[id number] Mpf) 30 ml 

STK-MED ONCE .ROUTE ;  Start 6/30/20 at 08:34;  Stop 6/30/20 at 08:35;  Status 

DC


Iohexol (Omnipaque 300 Mg/ml) 50 ml STK-MED ONCE .ROUTE  Last administered on 

6/30/20at 13:30;  Start 6/30/20 at 08:35;  Stop 6/30/20 at 08:35;  Status DC


Sodium Chloride 80 meq/Potassium Chloride 30 meq/ Potassium Acetate 30 

meq/Magnesium Sulfate 14 meq/ Multivitamins 10 ml/Chromium/ Copper/Manganese/ 

Seleni/Zn 1 ml/ Insulin Human Regular 15 unit/ Total Parenteral Nutrition/Amino 

Acids/Dextrose/ Fat Emulsion Intravenous 1,920 ml @  80 mls/hr TPN  CONT IV  

Last administered on 7/1/20at 01:22;  Start 6/30/20 at 22:00;  Stop 7/1/20 at 

21:59;  Status DC


Phenylephrine HCl (Ken-Synephrine Inj) 10 mg STK-MED ONCE .ROUTE ;  Start 

6/30/20 at 10:15;  Stop 6/30/20 at 10:15;  Status DC


Desflurane (Suprane) 90 ml STK-MED ONCE IH ;  Start 6/30/20 at 10:18;  Stop 

6/30/20 at 10:19;  Status DC


Albumin Human 500 ml @  As Directed STK-MED ONCE IV ;  Start 6/30/20 at 11:06;  

Stop 6/30/20 at 11:06;  Status DC


Vasopressin (Vasostrict) 20 unit STK-MED ONCE .ROUTE ;  Start 6/30/20 at 12:23; 

Stop 6/30/20 at 12:23;  Status DC


Phenylephrine HCl (Ken-Synephrine Inj) 10 mg STK-MED ONCE .ROUTE ;  Start 

6/30/20 at 13:33;  Stop 6/30/20 at 13:33;  Status DC


Phenylephrine HCl (Ken-Synephrine Inj) 10 mg STK-MED ONCE .ROUTE ;  Start 

6/30/20 at 13:33;  Stop 6/30/20 at 13:33;  Status DC


Ondansetron HCl (Zofran) 4 mg STK-MED ONCE .ROUTE ;  Start 6/30/20 at 13:33;  

Stop 6/30/20 at 13:33;  Status DC


Enoxaparin Sodium (Lovenox 40mg Syringe) 40 mg Q24H SQ ;  Start 7/1/20 at 08:00


Sodium Chloride (Normal Saline Flush) 3 ml QSHIFT  PRN IV AFTER MEDS AND BLOOD 

DRAWS;  Start 6/30/20 at 14:45


Naloxone HCl (Narcan) 0.4 mg PRN Q2MIN  PRN IV SEE INSTRUCTIONS;  Start 6/30/20 

at 14:45


Sodium Chloride 1,000 ml @  25 mls/hr Q24H IV  Last administered on 7/2/20at 

18:08;  Start 6/30/20 at 14:33


Morphine Sulfate (Morphine Sulfate) 1 mg PRN Q1HR  PRN IV PAIN;  Start 6/30/20 

at 14:45


Midazolam HCl 100 mg/Sodium Chloride 100 ml @ 1 mls/hr CONT  PRN IV SEE I/O 

RECORD Last administered on 7/3/20at 10:01;  Start 6/30/20 at 14:45


Phenylephrine HCl (PHENYLEPHRINE in 0.9% NACL PF) 1 mg STK-MED ONCE IV ;  Start 

6/30/20 at 14:44;  Stop 6/30/20 at 14:45;  Status DC


Ephedrine Sulfate (ePHEDrine PF IN SALINE SYRINGE) 50 mg STK-MED ONCE IV ;  

Start 6/30/20 at 14:45;  Stop 6/30/20 at 14:45;  Status DC


Vasopressin 20 unit/Dextrose 101 ml @  12 mls/hr CONT  PRN IV SEE I/O RECORD 

Last administered on 7/3/20at 07:26;  Start 6/30/20 at 15:30


Sodium Chloride 1,000 ml @  1,000 mls/hr 1X  ONCE IV  Last administered on 

6/30/20at 15:42;  Start 6/30/20 at 15:45;  Stop 6/30/20 at 16:44;  Status DC


Albumin Human 500 ml @  125 mls/hr 1X  ONCE IV ;  Start 6/30/20 at 16:00;  Stop 

6/30/20 at 19:59;  Status DC


Albumin Human 500 ml @  125 mls/hr PRN Q1HR  PRN IV PER PROTOCOL;  Start 6/30/20

at 15:45


Magnesium Sulfate 50 ml @ 25 mls/hr 1X  ONCE IV  Last administered on 6/30/20at 

17:02;  Start 6/30/20 at 16:30;  Stop 6/30/20 at 18:29;  Status DC


Sodium Bicarbonate (Sodium Bicarb Adult 8.4% Syr) 50 meq STK-MED ONCE .ROUTE ;  

Start 6/30/20 at 16:20;  Stop 6/30/20 at 16:20;  Status DC


Sodium Bicarbonate (Sodium Bicarb Adult 8.4% Syr) 100 meq 1X  ONCE IV  Last 

administered on 6/30/20at 17:07;  Start 6/30/20 at 16:30;  Stop 6/30/20 at 

16:31;  Status DC


Sodium Bicarbonate 150 meq/Dextrose 1,150 ml @  75 mls/hr 1X  ONCE IV  Last 

administered on 6/30/20at 20:02;  Start 6/30/20 at 16:30;  Stop 7/1/20 at 07:49;

 Status DC


Sodium Chloride 80 meq/Potassium Chloride 30 meq/ Potassium Acetate 30 

meq/Magnesium Sulfate 14 meq/ Multivitamins 10 ml/Chromium/ Copper/Manganese/ 

Seleni/Zn 1 ml/ Insulin Human Regular 15 unit/ Total Parenteral Nutrition/Amino 

Acids/Dextrose/ Fat Emulsion Intravenous 1,920 ml @  80 mls/hr TPN  CONT IV  

Last administered on 7/1/20at 23:05;  Start 7/1/20 at 22:00;  Stop 7/2/20 at 

21:59;  Status DC


Sodium Chloride 100 meq/Potassium Chloride 30 meq/ Potassium Acetate 30 

meq/Magnesium Sulfate 12 meq/ Multivitamins 10 ml/Chromium/ Copper/Manganese/ 

Seleni/Zn 1 ml/ Insulin Human Regular 15 unit/ Total Parenteral Nutrition/Amino 

Acids/Dextrose/ Fat Emulsion Intravenous 1,920 ml @  80 mls/hr TPN  CONT IV  

Last administered on 7/2/20at 21:52;  Start 7/2/20 at 22:00;  Stop 7/3/20 at 

21:59


Sodium Chloride 100 meq/Potassium Chloride 30 meq/ Potassium Acetate 30 

meq/Magnesium Sulfate 12 meq/ Multivitamins 10 ml/Chromium/ Copper/Manganese/ 

Seleni/Zn 1 ml/ Insulin Human Regular 15 unit/ Total Parenteral Nutrition/Amino 

Acids/Dextrose/ Fat Emulsion Intravenous 1,920 ml @  80 mls/hr TPN  CONT IV ;  

Start 7/3/20 at 22:00;  Stop 7/4/20 at 21:59





Active Scripts


Active


Reported


Bisoprolol Fumarate 5 Mg Tablet 10 Mg PO DAILY


Vitals/I & O





Vital Sign - Last 24 Hours








 7/2/20 7/2/20 7/2/20 7/2/20





 16:00 16:00 17:00 17:20


 


Temp 97.9   





 97.9   


 


Pulse 96  92 


 


Resp 14  14 


 


B/P (MAP) 110/67 (81)  113/68 (83) 


 


Pulse Ox 99  99 100


 


O2 Delivery Ventilator Mechanical Ventilator Ventilator Ventilator


 


    





    





 7/2/20 7/2/20 7/2/20 7/2/20





 18:00 19:00 20:00 20:00


 


Temp   97.7 





   97.7 


 


Pulse 68 76 86 


 


Resp 16 24 20 


 


B/P (MAP) 123/72 (89) 157/91 (113) 155/99 (117) 


 


Pulse Ox 99 100 98 


 


O2 Delivery Ventilator Ventilator Ventilator Mechanical Ventilator


 


    





    





 7/2/20 7/2/20 7/2/20 7/2/20





 20:15 20:18 21:00 22:00


 


Pulse   81 83


 


Resp   24 18


 


B/P (MAP)   109/53 (71) 156/88 (110)


 


Pulse Ox 100 100 100 100


 


O2 Delivery Ventilator Ventilator Ventilator Ventilator





 7/2/20 7/2/20 7/2/20 7/3/20





 23:00 23:21 23:41 00:00


 


Pulse 90   


 


Resp 18 19 24 


 


B/P (MAP) 128/77 (94)   


 


Pulse Ox 99 100 100 


 


O2 Delivery Ventilator   Mechanical Ventilator


 


O2 Flow Rate  40.0 40.0 





 7/3/20 7/3/20 7/3/20 7/3/20





 00:01 00:10 01:00 02:00


 


Temp 99.4   





 99.4   


 


Pulse 100  82 83


 


Resp 19  19 22


 


B/P (MAP) 135/77 (96)  131/76 (94) 127/75 (92)


 


Pulse Ox 100 100 99 99


 


O2 Delivery Ventilator Ventilator Ventilator Ventilator


 


    





    





 7/3/20 7/3/20 7/3/20 7/3/20





 03:00 03:47 04:00 04:00


 


Temp   98.5 





   98.5 


 


Pulse 83  87 


 


Resp 22  23 


 


B/P (MAP) 114/63 (80)  118/64 (82) 


 


Pulse Ox 99 100 99 


 


O2 Delivery Ventilator Ventilator Ventilator Mechanical Ventilator


 


    





    





 7/3/20 7/3/20 7/3/20 7/3/20





 05:00 05:39 06:00 06:09


 


Pulse 86  83 


 


Resp 25 18 26 26


 


B/P (MAP) 132/72 (92)  113/58 (76) 


 


Pulse Ox 99 100 99 99


 


O2 Delivery Ventilator Ventilator Ventilator 


 


O2 Flow Rate  40.0  40.0





 7/3/20 7/3/20 7/3/20 7/3/20





 07:00 07:15 07:30 07:45


 


Pulse 80 80 80 80


 


Resp 12   


 


B/P (MAP) 128/59 (82) 132/70 (90) 128/59 (82) 131/74 (93)


 


Pulse Ox 99   


 


O2 Delivery Ventilator   





 7/3/20 7/3/20 7/3/20 7/3/20





 08:00 08:00 08:15 08:30


 


Temp  98.8  





  98.8  


 


Pulse  84 90 86


 


Resp  14  


 


B/P (MAP)  102/61 (75) 94/53 (67) 102/61 (75)


 


Pulse Ox  99  


 


O2 Delivery Mechanical Ventilator Ventilator  


 


    





    





 7/3/20 7/3/20 7/3/20 7/3/20





 08:34 08:45 09:00 09:15


 


Pulse  80 75 82


 


Resp   14 


 


B/P (MAP)  108/67 (81) 140/76 (97) 138/73 (94)


 


Pulse Ox 93  99 


 


O2 Delivery Ventilator  Ventilator 





 7/3/20 7/3/20 7/3/20 7/3/20





 09:30 09:45 10:00 11:00


 


Pulse 82 82 82 83


 


Resp   14 14


 


B/P (MAP) 125/75 (92) 110/63 (79) 111/69 (83) 116/60 (78)


 


Pulse Ox   99 99


 


O2 Delivery   Ventilator Ventilator





 7/3/20 7/3/20 7/3/20 7/3/20





 11:11 11:30 12:00 13:00


 


Temp  98.2  





  98.2  


 


Pulse  87  84


 


Resp  14  14


 


B/P (MAP)  104/64 (77)  103/58 (73)


 


Pulse Ox 99 95  98


 


O2 Delivery Ventilator Ventilator Mechanical Ventilator Ventilator


 


    





    





 7/3/20 7/3/20 7/3/20 7/3/20





 13:42 13:55 14:00 14:30


 


Pulse   78 


 


Resp   12 


 


B/P (MAP)   114/68 (83) 


 


Pulse Ox 99 99 98 98


 


O2 Delivery Ventilator Ventilator Ventilator Ventilator





 7/3/20 7/3/20 7/3/20 





 15:00 15:38 15:52 


 


Pulse 86   


 


Resp 14   


 


B/P (MAP) 105/57 (73)   


 


Pulse Ox 98 98  


 


O2 Delivery Ventilator Ventilator Mechanical Ventilator 














Intake and Output   


 


 7/2/20 7/2/20 7/3/20





 15:00 23:00 07:00


 


Intake Total 300 ml 1618.79 ml 100 ml


 


Output Total 545 ml 705 ml 1180 ml


 


Balance -245 ml 913.79 ml -1080 ml











Justicifation of Admission Dx:


Justifications for Admission:


Justification of Admission Dx:  Yes











MG ARGUETA MD                  Jul 3, 2020 15:55

## 2020-07-03 NOTE — PDOC
Infectious Disease Note


Subjective


Subjective


Sedated 


Remains on ventilator support, FiO2 40% 


No fevers last 48 hrs


Tube feedings 


Still on vasopressin and low levophed gtt 


No BM





ROS


ROS


unobtainable montemayor to patient's condition





Vital Sign


Vital Signs





Vital Signs








  Date Time  Temp Pulse Resp B/P (MAP) Pulse Ox O2 Delivery O2 Flow Rate FiO2


 


7/3/20 07:00  80 12 128/59 (82) 99 Ventilator  


 


7/3/20 06:09       40.0 


 


7/3/20 04:00 98.5       





 98.5       











Physical Exam


PHYSICAL EXAM


GENERAL: Sedated, ill appearing


HEENT: Pupils equal, oral cavity dry. + NGT


NECK:  Tracheostomy 


LUNGS: Diminished aeration bases,  CT on left 


HEART:  S1, S2, regular w/ PVCs 


ABDOMEN: Mild distention, bowel sounds hypoactive, soft, gildardo x2, 3 ROBERT 

drains, G-J tube and + wound vac 


: [place] 


EXTREMITIES: Generalized edema, no cyanosis. SCDs & Podus boots bilaterally  


SKIN: warm touch. No signs of rash.  


LUE-PICC without signs of complications 


LUE art-line now out, some mottling left forearm. RP palpable, cap refill brisk.





Labs


Lab





Laboratory Tests








Test


 7/2/20


12:43 7/2/20


18:10 7/3/20


05:30 7/3/20


06:01


 


Glucose (Fingerstick)


 184 mg/dL


(70-99) 176 mg/dL


(70-99) 


 150 mg/dL


(70-99)


 


White Blood Count


 


 


 29.7 x10^3/uL


(4.0-11.0) 





 


Red Blood Count


 


 


 2.85 x10^6/uL


(3.50-5.40) 





 


Hemoglobin


 


 


 8.3 g/dL


(12.0-15.5) 





 


Hematocrit


 


 


 25.0 %


(36.0-47.0) 





 


Mean Corpuscular Volume   88 fL ()  


 


Mean Corpuscular Hemoglobin   29 pg (25-35)  


 


Mean Corpuscular Hemoglobin


Concent 


 


 33 g/dL


(31-37) 





 


Red Cell Distribution Width


 


 


 15.0 %


(11.5-14.5) 





 


Platelet Count


 


 


 260 x10^3/uL


(140-400) 





 


Neutrophils (%) (Auto)   90 % (31-73)  


 


Lymphocytes (%) (Auto)   4 % (24-48)  


 


Monocytes (%) (Auto)   5 % (0-9)  


 


Eosinophils (%) (Auto)   1 % (0-3)  


 


Basophils (%) (Auto)   0 % (0-3)  


 


Neutrophils # (Auto)


 


 


 26.6 x10^3/uL


(1.8-7.7) 





 


Lymphocytes # (Auto)


 


 


 1.3 x10^3/uL


(1.0-4.8) 





 


Monocytes # (Auto)


 


 


 1.5 x10^3/uL


(0.0-1.1) 





 


Eosinophils # (Auto)


 


 


 0.3 x10^3/uL


(0.0-0.7) 





 


Basophils # (Auto)


 


 


 0.0 x10^3/uL


(0.0-0.2) 





 


Segmented Neutrophils %   87 % (35-66)  


 


Band Neutrophils %   7 % (0-9)  


 


Lymphocytes %   4 % (24-48)  


 


Monocytes %   2 % (0-10)  


 


Platelet Estimate


 


 


 Adequate


(ADEQUATE) 





 


Polychromasia   Present  


 


Anisocytosis   Present  








Micro


6/28. BLOOD CULTURE  Preliminary  


        NO GROWTH AFTER 4 DAYS                                 








6/30.  GRAM STAIN  Final  


        Final





        SQUAMOUS EPI CELL:RARE


        PMN (WBCs):FEW


        YEAST:FEW


        


  ANAEROBIC-AEROBIC CULTURE  


                                PENDING





Objective


Assessment


Patient with prolonged hospitalization more than 3 months


Multiple medical problems


Multiple surgical procedures





S/P Exp. Lap, REN, subtotal cholecystectomy with cholangiogram, G-J tube 

placement & pancreatic necrosectomy on June 30 YEAST


Leucocytosis - leukomoid reaction likely postoperative


Fever intermittently source likely GI


Acute gallstone pancreatitis with persistent necrosis


  -CT a/p 4/9.  Increased ascites. Persistent evidence of necrotizing 

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


           - 4/27. status post ROBERT drain placement; C. parapsilosis. s/p drain 

5/6 + yeast & high amylase; s/p additional drain on 5/8. Drains removed. 


           -5/6. fluid  candida parapsilosis fluid, amylase high


           - 6/6 showed multiple pseudocysts, slight larger on the right. s/p 

drains x 3, 6/7.  + PSAE (MDRO-R Cefepime, Zosyn ANSON < 64) and yeast, 


           -6/7 s/p drain replacement x 3; fluid cult PSAE (MDRO), yeast; 

treated


Ascites s/p paracentesis 4/15 & 5/6. C. parapsilosis 


Cholelithiasis with thickening of the gallbladder wall.


JED, Hyperkalemia, Metabolic acidosis off dialysis


Acute hypoxic resp failure. trach/vent. sputum 6/13  + PSAE (I merrem) 


Pleural effusions s/p left thoracentesis, 5/12. no culture. s/p left chest tube,

6/15 no growth


Hypocalcemia 


Prediabetes


HTN


Anemia s/p RBCs





Plan


Plan of Care


continue dapto, merrem and micafungin


Monitor labs/cults


wound care /drain management as directed


Monitor left forearm


Check CK level 





Contact isolation for CRE/MDRO


Critically ill





D/w nursing





Attending Co-Sign


Attending Co-Sign


The patient was seen and interviewed as well as examined at the bedside. The 

chart was reviewed. The case was discussed. Agree with the plan of care.











HAVEN WINTERS         Jul 3, 2020 08:19


RASHAWN ROSEN MD               Jul 3, 2020 14:39

## 2020-07-03 NOTE — NUR
Pharmacy TPN Dosing Note



S: JESENIA BEAN is a 49 year old F Currently receiving Central Continuous TPN started 
03/18/20



B:Pertinent PMH: Necrotizing pancreatitis



LABS:

Sodium:    133 

Potassium: 3.9 

Chloride:  102 

Calcium:   8.6 

Corrected Calcium: 11.00 

Magnesium: 2.1 

CO2:       28 

SCr:       0.6 

Glucose:   150 

Albumin:   1.0 

AST:       42 

ALT:       98 



TPN FORMULA:

TPN TYPE:  Central Continuous

AMINO ACIDS:         80 gm

DEXTROSE:            250 gm

LIPIDS:              20 gm

SODIUM CHLORIDE:     100 mEq

SODIUM ACETATE:      - mEq

SODIUM PHOSPHATE:    - mmol

POTASSIUM CHLORIDE:  30 mEq

POTASSIUM ACETATE:   30 mEq

POTASSIUM PHOSPHATE: - mmol

MAGNESIUM:           12 mEq

CALCIUM:             - mEq

INSULIN:             15 units

MULTIPLE VITAMIN:    10 ml

TRACE ELEMENTS:      1 ml ml(s)



TPN PLAN:  7/3 CONT SAME, REPEAT LABS IN AM





R: Continue TPN 

Will monitor electrolytes, glucose, and tolerance to TPN.



 MURPHY ACOSTA [facility], 07/03/20 1145

## 2020-07-03 NOTE — PDOC
PULMONARY PROGRESS NOTES


Subjective


On assist control ventilation sedated


Vitals





Vital Signs








  Date Time  Temp Pulse Resp B/P (MAP) Pulse Ox O2 Delivery O2 Flow Rate FiO2


 


7/3/20 08:34     100 Ventilator  


 


7/3/20 08:00 98.8 84 14 102/61 (75)    





 98.8       


 


7/3/20 06:09       40.0 








General:  Alert


Lungs:  Crackles


Cardiovascular:  S1, S2


Abdomen:  Soft, Non-tender, Other (multiple ROBERT drains )


Extremities:  Other (+1 BLE edema)


Skin:  Warm


Labs





Laboratory Tests








Test


 7/1/20


11:55 7/1/20


16:30 7/1/20


17:49 7/2/20


00:19


 


O2 Saturation 97 % (92-99)    


 


Arterial Blood pH


 7.38


(7.35-7.45) 


 


 





 


Arterial Blood pCO2 at


Patient Temp 38 mmHg


(35-46) 


 


 





 


Arterial Blood pO2 at Patient


Temp 111 mmHg


() 


 


 





 


Arterial Blood HCO3


 22 mmol/L


(21-28) 


 


 





 


Arterial Blood Base Excess


 -3 mmol/L


(-3-3) 


 


 





 


FiO2 40    


 


Glucose (Fingerstick)


 235 mg/dL


(70-99) 


 201 mg/dL


(70-99) 200 mg/dL


(70-99)


 


Hemoglobin


 


 7.3 g/dL


(12.0-15.5) 


 





 


Hematocrit


 


 21.4 %


(36.0-47.0) 


 





 


Mean Corpuscular Hemoglobin


Concent 


 34 g/dL


(31-37) 


 





 


Test


 7/2/20


06:09 7/2/20


06:10 7/2/20


08:00 7/2/20


12:43


 


Glucose (Fingerstick)


 157 mg/dL


(70-99) 


 


 184 mg/dL


(70-99)


 


White Blood Count


 


 38.3 x10^3/uL


(4.0-11.0) 


 





 


Red Blood Count


 


 3.06 x10^6/uL


(3.50-5.40) 


 





 


Hemoglobin


 


 9.0 g/dL


(12.0-15.5) 


 





 


Hematocrit


 


 26.8 %


(36.0-47.0) 


 





 


Mean Corpuscular Volume  88 fL ()   


 


Mean Corpuscular Hemoglobin  29 pg (25-35)   


 


Mean Corpuscular Hemoglobin


Concent 


 34 g/dL


(31-37) 


 





 


Red Cell Distribution Width


 


 14.8 %


(11.5-14.5) 


 





 


Platelet Count


 


 278 x10^3/uL


(140-400) 


 





 


Sodium Level


 


 134 mmol/L


(136-145) 


 





 


Potassium Level


 


 4.0 mmol/L


(3.5-5.1) 


 





 


Chloride Level


 


 103 mmol/L


() 


 





 


Carbon Dioxide Level


 


 28 mmol/L


(21-32) 


 





 


Anion Gap  3 (6-14)   


 


Blood Urea Nitrogen


 


 24 mg/dL


(7-20) 


 





 


Creatinine


 


 0.7 mg/dL


(0.6-1.0) 


 





 


Estimated GFR


(Cockcroft-Gault) 


 88.9 


 


 





 


Glucose Level


 


 178 mg/dL


(70-99) 


 





 


Calcium Level


 


 8.9 mg/dL


(8.5-10.1) 


 





 


Phosphorus Level


 


 4.2 mg/dL


(2.6-4.7) 


 





 


Magnesium Level


 


 2.1 mg/dL


(1.8-2.4) 


 





 


O2 Saturation   98 % (92-99)  


 


Arterial Blood pH


 


 


 7.36


(7.35-7.45) 





 


Arterial Blood pCO2 at


Patient Temp 


 


 41 mmHg


(35-46) 





 


Arterial Blood pO2 at Patient


Temp 


 


 127 mmHg


() 





 


Arterial Blood HCO3


 


 


 23 mmol/L


(21-28) 





 


Arterial Blood Base Excess


 


 


 -2 mmol/L


(-3-3) 





 


FiO2   40  


 


Test


 7/2/20


18:10 7/3/20


05:30 7/3/20


06:01 7/3/20


08:00


 


Glucose (Fingerstick)


 176 mg/dL


(70-99) 


 150 mg/dL


(70-99) 





 


White Blood Count


 


 29.7 x10^3/uL


(4.0-11.0) 


 





 


Red Blood Count


 


 2.85 x10^6/uL


(3.50-5.40) 


 





 


Hemoglobin


 


 8.3 g/dL


(12.0-15.5) 


 





 


Hematocrit


 


 25.0 %


(36.0-47.0) 


 





 


Mean Corpuscular Volume  88 fL ()   


 


Mean Corpuscular Hemoglobin  29 pg (25-35)   


 


Mean Corpuscular Hemoglobin


Concent 


 33 g/dL


(31-37) 


 





 


Red Cell Distribution Width


 


 15.0 %


(11.5-14.5) 


 





 


Platelet Count


 


 260 x10^3/uL


(140-400) 


 





 


Neutrophils (%) (Auto)  90 % (31-73)   


 


Lymphocytes (%) (Auto)  4 % (24-48)   


 


Monocytes (%) (Auto)  5 % (0-9)   


 


Eosinophils (%) (Auto)  1 % (0-3)   


 


Basophils (%) (Auto)  0 % (0-3)   


 


Neutrophils # (Auto)


 


 26.6 x10^3/uL


(1.8-7.7) 


 





 


Lymphocytes # (Auto)


 


 1.3 x10^3/uL


(1.0-4.8) 


 





 


Monocytes # (Auto)


 


 1.5 x10^3/uL


(0.0-1.1) 


 





 


Eosinophils # (Auto)


 


 0.3 x10^3/uL


(0.0-0.7) 


 





 


Basophils # (Auto)


 


 0.0 x10^3/uL


(0.0-0.2) 


 





 


Segmented Neutrophils %  87 % (35-66)   


 


Band Neutrophils %  7 % (0-9)   


 


Lymphocytes %  4 % (24-48)   


 


Monocytes %  2 % (0-10)   


 


Platelet Estimate


 


 Adequate


(ADEQUATE) 


 





 


Polychromasia  Present   


 


Anisocytosis  Present   


 


Creatine Kinase


 


 17 U/L


() 


 





 


O2 Saturation    85 % (92-99) 


 


Arterial Blood pH


 


 


 


 7.36


(7.35-7.45)


 


Arterial Blood pCO2 at


Patient Temp 


 


 


 48 mmHg


(35-46)


 


Arterial Blood pO2 at Patient


Temp 


 


 


 54 mmHg


()


 


Arterial Blood HCO3


 


 


 


 27 mmol/L


(21-28)


 


Arterial Blood Base Excess


 


 


 


 1 mmol/L


(-3-3)


 


FiO2    40 








Laboratory Tests








Test


 7/2/20


12:43 7/2/20


18:10 7/3/20


05:30 7/3/20


06:01


 


Glucose (Fingerstick)


 184 mg/dL


(70-99) 176 mg/dL


(70-99) 


 150 mg/dL


(70-99)


 


White Blood Count


 


 


 29.7 x10^3/uL


(4.0-11.0) 





 


Red Blood Count


 


 


 2.85 x10^6/uL


(3.50-5.40) 





 


Hemoglobin


 


 


 8.3 g/dL


(12.0-15.5) 





 


Hematocrit


 


 


 25.0 %


(36.0-47.0) 





 


Mean Corpuscular Volume   88 fL ()  


 


Mean Corpuscular Hemoglobin   29 pg (25-35)  


 


Mean Corpuscular Hemoglobin


Concent 


 


 33 g/dL


(31-37) 





 


Red Cell Distribution Width


 


 


 15.0 %


(11.5-14.5) 





 


Platelet Count


 


 


 260 x10^3/uL


(140-400) 





 


Neutrophils (%) (Auto)   90 % (31-73)  


 


Lymphocytes (%) (Auto)   4 % (24-48)  


 


Monocytes (%) (Auto)   5 % (0-9)  


 


Eosinophils (%) (Auto)   1 % (0-3)  


 


Basophils (%) (Auto)   0 % (0-3)  


 


Neutrophils # (Auto)


 


 


 26.6 x10^3/uL


(1.8-7.7) 





 


Lymphocytes # (Auto)


 


 


 1.3 x10^3/uL


(1.0-4.8) 





 


Monocytes # (Auto)


 


 


 1.5 x10^3/uL


(0.0-1.1) 





 


Eosinophils # (Auto)


 


 


 0.3 x10^3/uL


(0.0-0.7) 





 


Basophils # (Auto)


 


 


 0.0 x10^3/uL


(0.0-0.2) 





 


Segmented Neutrophils %   87 % (35-66)  


 


Band Neutrophils %   7 % (0-9)  


 


Lymphocytes %   4 % (24-48)  


 


Monocytes %   2 % (0-10)  


 


Platelet Estimate


 


 


 Adequate


(ADEQUATE) 





 


Polychromasia   Present  


 


Anisocytosis   Present  


 


Creatine Kinase


 


 


 17 U/L


() 





 


Test


 7/3/20


08:00 


 


 





 


O2 Saturation 85 % (92-99)    


 


Arterial Blood pH


 7.36


(7.35-7.45) 


 


 





 


Arterial Blood pCO2 at


Patient Temp 48 mmHg


(35-46) 


 


 





 


Arterial Blood pO2 at Patient


Temp 54 mmHg


() 


 


 





 


Arterial Blood HCO3


 27 mmol/L


(21-28) 


 


 





 


Arterial Blood Base Excess


 1 mmol/L


(-3-3) 


 


 





 


FiO2 40    








Medications





Active Scripts








 Medications  Dose


 Route/Sig


 Max Daily Dose Days Date Category


 


 Bisoprolol


 Fumarate 5 Mg


 Tablet  10 Mg


 PO DAILY


   3/16/20 Reported








Comments


 CXR 6/28/20


IMPRESSION:


No significant interval change compared to 6/25/2020.


 











ct abdomen /pelvis 6/6


1. Removal of the percutaneous pigtail drainage catheters since the prior 


exam. Sequela of pancreatitis with extensive pseudocysts again 


demonstrated, the right-sided collections are slightly larger since the 


prior exam, the left-sided collections are stable. See above.


2. Moderate to large left pleural effusion with atelectasis and collapse 


of most of the left lower lobe, stable. Small right pleural effusion is 


stable.


3. Gallstone.





ct chest 6/15 reviewed








 GRAM NEG COCCOBACILLI:MANY


        SQUAMOUS EPI CELL:RARE


        PMN (WBCs):FEW


        Unless otherwise specified, Testing Performed by:


        [facility]


        [address] [place] [address]


        For Inquires, the Physician may contact the Microbiology


        department at [phone]





  RESPIRATORY CULTURE  Final  


        Final





        MANY GRAM NEGATIVE RODS on 06/15/20 at 1107


        FINAL ID= [PSEUDOMONAS AERUGINOSA]


        MICRO CHARGES


        PSEUDOMONAS AERUGINOSA





  ANTIMICROBIAL SUSCEPTIBILITY  Final  


        Comment





        NEG ANSON 56


        PSEUDOMONAS AERUGINOSA


        ANTIBIOTIC                        RESULT          INTERPRETATION


        AMIKACIN                          <=16                  S


        AZTREONAM                         <=4                   S


        CEFTAZIDIME                       <=1                   S


        CIPROFLOXACIN                     <=0.25                S


        CEFEPIME                          <=2                   S


        CEFTAZIDIME/AVIBACTAM             <=4                   S


        GENTAMICIN                        <=2                   S


        LEVOFLOXACIN                      <=0.5                 S





Impression


.





IMPRESSION:


1.  Acute hypoxemic respiratory failure secondary to ARDS status post trach, 

developed anemia 6/7, blood drainage from RLQ abdomen drain site, and 

surrounding firmness  / developed septic shock 6/7 from abdomen source, required

levo 6/7


s/p 3 new drains 6/7 with brown color drainage,  on/off vent , on TS now


2.  Gallstone pancreatitis, now with ongoing bleeding from prior drain. Anemic. 

s/p Tx multiple units over several days


3.  septic shock/sepsis, recurrent 6/7, source abdomen. , off levo now, 


4.  Acute kidney injury-, Off HD--renal function decling. suspect JED on CKD due

to hypotension , improved now


5.  Acute gallstone pancreatitis.


6.  Hypoalbuminemia.


7.  Moderate persistent effusions, s/p left thora  5/12, reaccumulation of left 

effusion. O2 requirement not changed. 


8.  Fever- ,hypotension. suspect recurrent sepsis/ likely pancreatic source.  

Per ID, per surgery--


9.  Chronic anemia-- ongoing / s/p PRBC


10. Covid 19 testing negative


11. Moderate to large ascites-S/P paracentisis


12.S/P paracentisis with 4 liters removed on 4/15/20


13. S/P IR drain placement on 5/8/2020, removal, re inserted 6/7


14. Depression/Anxiety 


15. Increase effusion, ? loculated/ s/p chest tube.. drainage slowing down. 200 

cc /24 hr





6/30


Exploratory laparotomy, lysis of adhesions, subtotal cholecystectomy with 

cholangiogram, gastrojejunostomy tube placement, pancreatic necrosectomy





Plan


.


Nothing new to add we will continue support 


we will continue assist control ventilation


Multiple drains in place OR note


Anticoagulation for DVT prophylaxis


Antibiotics per ID


Pressors for hypotension


IV fluids


Follow labs.





CC time 30 minutes











STEVE MIRANDA MD               Jul 3, 2020 08:56

## 2020-07-03 NOTE — NUR
Low urine output at the beginning of shift. Flushed pepe without difficulty. Increased 
urine output.  Tracheostomy noted to have greenish yellow discharge oozing around. Trach 
care completed and redness noted around stoma and cannula. Phlange of trach not flush with 
the skin.  Will continue to monitor.

## 2020-07-03 NOTE — PDOC
G I PROGRESS NOTE


Subjective


Intubated/sedated.


Objective


Staff indicate no new issues.  Still high output from retroperitoneal drains.


Physical Exam


Ventilated.


Not distended.


Review of Relevant


I have reviewed the following items josy (where applicable) has been applied.


Labs





Laboratory Tests








Test


 7/1/20


11:55 7/1/20


16:30 7/1/20


17:49 7/2/20


00:19


 


O2 Saturation 97 % (92-99)    


 


Arterial Blood pH


 7.38


(7.35-7.45) 


 


 





 


Arterial Blood pCO2 at


Patient Temp 38 mmHg


(35-46) 


 


 





 


Arterial Blood pO2 at Patient


Temp 111 mmHg


() 


 


 





 


Arterial Blood HCO3


 22 mmol/L


(21-28) 


 


 





 


Arterial Blood Base Excess


 -3 mmol/L


(-3-3) 


 


 





 


FiO2 40    


 


Glucose (Fingerstick)


 235 mg/dL


(70-99) 


 201 mg/dL


(70-99) 200 mg/dL


(70-99)


 


Hemoglobin


 


 7.3 g/dL


(12.0-15.5) 


 





 


Hematocrit


 


 21.4 %


(36.0-47.0) 


 





 


Mean Corpuscular Hemoglobin


Concent 


 34 g/dL


(31-37) 


 





 


Test


 7/2/20


06:09 7/2/20


06:10 7/2/20


08:00 7/2/20


12:43


 


Glucose (Fingerstick)


 157 mg/dL


(70-99) 


 


 184 mg/dL


(70-99)


 


White Blood Count


 


 38.3 x10^3/uL


(4.0-11.0) 


 





 


Red Blood Count


 


 3.06 x10^6/uL


(3.50-5.40) 


 





 


Hemoglobin


 


 9.0 g/dL


(12.0-15.5) 


 





 


Hematocrit


 


 26.8 %


(36.0-47.0) 


 





 


Mean Corpuscular Volume  88 fL ()   


 


Mean Corpuscular Hemoglobin  29 pg (25-35)   


 


Mean Corpuscular Hemoglobin


Concent 


 34 g/dL


(31-37) 


 





 


Red Cell Distribution Width


 


 14.8 %


(11.5-14.5) 


 





 


Platelet Count


 


 278 x10^3/uL


(140-400) 


 





 


Sodium Level


 


 134 mmol/L


(136-145) 


 





 


Potassium Level


 


 4.0 mmol/L


(3.5-5.1) 


 





 


Chloride Level


 


 103 mmol/L


() 


 





 


Carbon Dioxide Level


 


 28 mmol/L


(21-32) 


 





 


Anion Gap  3 (6-14)   


 


Blood Urea Nitrogen


 


 24 mg/dL


(7-20) 


 





 


Creatinine


 


 0.7 mg/dL


(0.6-1.0) 


 





 


Estimated GFR


(Cockcroft-Gault) 


 88.9 


 


 





 


Glucose Level


 


 178 mg/dL


(70-99) 


 





 


Calcium Level


 


 8.9 mg/dL


(8.5-10.1) 


 





 


Phosphorus Level


 


 4.2 mg/dL


(2.6-4.7) 


 





 


Magnesium Level


 


 2.1 mg/dL


(1.8-2.4) 


 





 


O2 Saturation   98 % (92-99)  


 


Arterial Blood pH


 


 


 7.36


(7.35-7.45) 





 


Arterial Blood pCO2 at


Patient Temp 


 


 41 mmHg


(35-46) 





 


Arterial Blood pO2 at Patient


Temp 


 


 127 mmHg


() 





 


Arterial Blood HCO3


 


 


 23 mmol/L


(21-28) 





 


Arterial Blood Base Excess


 


 


 -2 mmol/L


(-3-3) 





 


FiO2   40  


 


Test


 7/2/20


18:10 7/3/20


05:30 7/3/20


06:01 7/3/20


08:00


 


Glucose (Fingerstick)


 176 mg/dL


(70-99) 


 150 mg/dL


(70-99) 





 


White Blood Count


 


 29.7 x10^3/uL


(4.0-11.0) 


 





 


Red Blood Count


 


 2.85 x10^6/uL


(3.50-5.40) 


 





 


Hemoglobin


 


 8.3 g/dL


(12.0-15.5) 


 





 


Hematocrit


 


 25.0 %


(36.0-47.0) 


 





 


Mean Corpuscular Volume  88 fL ()   


 


Mean Corpuscular Hemoglobin  29 pg (25-35)   


 


Mean Corpuscular Hemoglobin


Concent 


 33 g/dL


(31-37) 


 





 


Red Cell Distribution Width


 


 15.0 %


(11.5-14.5) 


 





 


Platelet Count


 


 260 x10^3/uL


(140-400) 


 





 


Neutrophils (%) (Auto)  90 % (31-73)   


 


Lymphocytes (%) (Auto)  4 % (24-48)   


 


Monocytes (%) (Auto)  5 % (0-9)   


 


Eosinophils (%) (Auto)  1 % (0-3)   


 


Basophils (%) (Auto)  0 % (0-3)   


 


Neutrophils # (Auto)


 


 26.6 x10^3/uL


(1.8-7.7) 


 





 


Lymphocytes # (Auto)


 


 1.3 x10^3/uL


(1.0-4.8) 


 





 


Monocytes # (Auto)


 


 1.5 x10^3/uL


(0.0-1.1) 


 





 


Eosinophils # (Auto)


 


 0.3 x10^3/uL


(0.0-0.7) 


 





 


Basophils # (Auto)


 


 0.0 x10^3/uL


(0.0-0.2) 


 





 


Segmented Neutrophils %  87 % (35-66)   


 


Band Neutrophils %  7 % (0-9)   


 


Lymphocytes %  4 % (24-48)   


 


Monocytes %  2 % (0-10)   


 


Platelet Estimate


 


 Adequate


(ADEQUATE) 


 





 


Polychromasia  Present   


 


Anisocytosis  Present   


 


Creatine Kinase


 


 17 U/L


() 


 





 


O2 Saturation    85 % (92-99) 


 


Arterial Blood pH


 


 


 


 7.36


(7.35-7.45)


 


Arterial Blood pCO2 at


Patient Temp 


 


 


 48 mmHg


(35-46)


 


Arterial Blood pO2 at Patient


Temp 


 


 


 54 mmHg


()


 


Arterial Blood HCO3


 


 


 


 27 mmol/L


(21-28)


 


Arterial Blood Base Excess


 


 


 


 1 mmol/L


(-3-3)


 


FiO2    40 


 


Test


 7/3/20


10:16 


 


 





 


Sodium Level


 133 mmol/L


(136-145) 


 


 





 


Potassium Level


 3.9 mmol/L


(3.5-5.1) 


 


 





 


Chloride Level


 102 mmol/L


() 


 


 





 


Carbon Dioxide Level


 28 mmol/L


(21-32) 


 


 





 


Anion Gap 3 (6-14)    


 


Blood Urea Nitrogen


 20 mg/dL


(7-20) 


 


 





 


Creatinine


 0.6 mg/dL


(0.6-1.0) 


 


 





 


Estimated GFR


(Cockcroft-Gault) 106.3 


 


 


 





 


BUN/Creatinine Ratio 33 (6-20)    


 


Glucose Level


 224 mg/dL


(70-99) 


 


 





 


Calcium Level


 8.6 mg/dL


(8.5-10.1) 


 


 





 


Total Bilirubin


 0.6 mg/dL


(0.2-1.0) 


 


 





 


Aspartate Amino Transf


(AST/SGOT) 42 U/L (15-37) 


 


 


 





 


Alanine Aminotransferase


(ALT/SGPT) 98 U/L (14-59) 


 


 


 





 


Alkaline Phosphatase


 199 U/L


() 


 


 





 


Total Protein


 3.7 g/dL


(6.4-8.2) 


 


 





 


Albumin


 1.0 g/dL


(3.4-5.0) 


 


 





 


Albumin/Globulin Ratio 0.4 (1.0-1.7)    








Laboratory Tests








Test


 7/2/20


12:43 7/2/20


18:10 7/3/20


05:30 7/3/20


06:01


 


Glucose (Fingerstick)


 184 mg/dL


(70-99) 176 mg/dL


(70-99) 


 150 mg/dL


(70-99)


 


White Blood Count


 


 


 29.7 x10^3/uL


(4.0-11.0) 





 


Red Blood Count


 


 


 2.85 x10^6/uL


(3.50-5.40) 





 


Hemoglobin


 


 


 8.3 g/dL


(12.0-15.5) 





 


Hematocrit


 


 


 25.0 %


(36.0-47.0) 





 


Mean Corpuscular Volume   88 fL ()  


 


Mean Corpuscular Hemoglobin   29 pg (25-35)  


 


Mean Corpuscular Hemoglobin


Concent 


 


 33 g/dL


(31-37) 





 


Red Cell Distribution Width


 


 


 15.0 %


(11.5-14.5) 





 


Platelet Count


 


 


 260 x10^3/uL


(140-400) 





 


Neutrophils (%) (Auto)   90 % (31-73)  


 


Lymphocytes (%) (Auto)   4 % (24-48)  


 


Monocytes (%) (Auto)   5 % (0-9)  


 


Eosinophils (%) (Auto)   1 % (0-3)  


 


Basophils (%) (Auto)   0 % (0-3)  


 


Neutrophils # (Auto)


 


 


 26.6 x10^3/uL


(1.8-7.7) 





 


Lymphocytes # (Auto)


 


 


 1.3 x10^3/uL


(1.0-4.8) 





 


Monocytes # (Auto)


 


 


 1.5 x10^3/uL


(0.0-1.1) 





 


Eosinophils # (Auto)


 


 


 0.3 x10^3/uL


(0.0-0.7) 





 


Basophils # (Auto)


 


 


 0.0 x10^3/uL


(0.0-0.2) 





 


Segmented Neutrophils %   87 % (35-66)  


 


Band Neutrophils %   7 % (0-9)  


 


Lymphocytes %   4 % (24-48)  


 


Monocytes %   2 % (0-10)  


 


Platelet Estimate


 


 


 Adequate


(ADEQUATE) 





 


Polychromasia   Present  


 


Anisocytosis   Present  


 


Creatine Kinase


 


 


 17 U/L


() 





 


Test


 7/3/20


08:00 7/3/20


10:16 


 





 


O2 Saturation 85 % (92-99)    


 


Arterial Blood pH


 7.36


(7.35-7.45) 


 


 





 


Arterial Blood pCO2 at


Patient Temp 48 mmHg


(35-46) 


 


 





 


Arterial Blood pO2 at Patient


Temp 54 mmHg


() 


 


 





 


Arterial Blood HCO3


 27 mmol/L


(21-28) 


 


 





 


Arterial Blood Base Excess


 1 mmol/L


(-3-3) 


 


 





 


FiO2 40    


 


Sodium Level


 


 133 mmol/L


(136-145) 


 





 


Potassium Level


 


 3.9 mmol/L


(3.5-5.1) 


 





 


Chloride Level


 


 102 mmol/L


() 


 





 


Carbon Dioxide Level


 


 28 mmol/L


(21-32) 


 





 


Anion Gap  3 (6-14)   


 


Blood Urea Nitrogen


 


 20 mg/dL


(7-20) 


 





 


Creatinine


 


 0.6 mg/dL


(0.6-1.0) 


 





 


Estimated GFR


(Cockcroft-Gault) 


 106.3 


 


 





 


BUN/Creatinine Ratio  33 (6-20)   


 


Glucose Level


 


 224 mg/dL


(70-99) 


 





 


Calcium Level


 


 8.6 mg/dL


(8.5-10.1) 


 





 


Total Bilirubin


 


 0.6 mg/dL


(0.2-1.0) 


 





 


Aspartate Amino Transf


(AST/SGOT) 


 42 U/L (15-37) 


 


 





 


Alanine Aminotransferase


(ALT/SGPT) 


 98 U/L (14-59) 


 


 





 


Alkaline Phosphatase


 


 199 U/L


() 


 





 


Total Protein


 


 3.7 g/dL


(6.4-8.2) 


 





 


Albumin


 


 1.0 g/dL


(3.4-5.0) 


 





 


Albumin/Globulin Ratio  0.4 (1.0-1.7)   








Microbiology


6/30/20 Gram Stain - Final, Resulted


          


6/30/20 Aerobic and Anaerobic Culture, Resulted


          Pending


6/28/20 Blood Culture - Preliminary, Resulted


          NO GROWTH AFTER 4 DAYS


6/15/20 Gram Stain - Final, Complete


          


6/15/20 Aerobic and Anaerobic Culture - Final, Complete


          


6/13/20 Gram Stain Evaluation - Final, Complete


          


6/13/20 Respiratory Culture - Final, Complete


          


6/13/20 Antimicrobic Susceptibility - Final, Complete


          


6/7/20 Urine Culture - Final, Complete


         


5/30/20 Gram Stain - Final, Complete


          


5/30/20 Aerobic Culture - Final, Complete


Vitals/I & O





Vital Sign - Last 24 Hours








 7/2/20 7/2/20 7/2/20 7/2/20





 11:52 12:00 12:00 12:00


 


Temp   97.7 





   97.7 


 


Pulse   88 


 


Resp   16 


 


B/P (MAP)   134/62 (86) 


 


Pulse Ox 99  99 


 


O2 Delivery Ventilator Mechanical Ventilator Ventilator 


 


    





    





 7/2/20 7/2/20 7/2/20 7/2/20





 12:32 13:00 13:07 13:13


 


Pulse  79  


 


Resp 16 16  16


 


B/P (MAP)  120/62 (81)  


 


Pulse Ox 99 99 100 100


 


O2 Delivery Ventilator Ventilator Ventilator Ventilator





 7/2/20 7/2/20 7/2/20 7/2/20





 14:00 15:00 15:50 16:00


 


Temp    97.9





    97.9


 


Pulse 98 102  96


 


Resp 16 12  14


 


B/P (MAP) 88/76 (80) 136/74 (94)  110/67 (81)


 


Pulse Ox 99 99 100 99


 


O2 Delivery Ventilator Ventilator Ventilator Ventilator


 


    





    





 7/2/20 7/2/20 7/2/20 7/2/20





 16:00 17:00 17:20 18:00


 


Pulse  92  68


 


Resp  14  16


 


B/P (MAP)  113/68 (83)  123/72 (89)


 


Pulse Ox  99 100 99


 


O2 Delivery Mechanical Ventilator Ventilator Ventilator Ventilator





 7/2/20 7/2/20 7/2/20 7/2/20





 19:00 20:00 20:00 20:15


 


Temp  97.7  





  97.7  


 


Pulse 76 86  


 


Resp 24 20  


 


B/P (MAP) 157/91 (113) 155/99 (117)  


 


Pulse Ox 100 98  100


 


O2 Delivery Ventilator Ventilator Mechanical Ventilator Ventilator


 


    





    





 7/2/20 7/2/20 7/2/20 7/2/20





 20:18 21:00 22:00 23:00


 


Pulse  81 83 90


 


Resp  24 18 18


 


B/P (MAP)  109/53 (71) 156/88 (110) 128/77 (94)


 


Pulse Ox 100 100 100 99


 


O2 Delivery Ventilator Ventilator Ventilator Ventilator





 7/2/20 7/2/20 7/3/20 7/3/20





 23:21 23:41 00:00 00:01


 


Temp    99.4





    99.4


 


Pulse    100


 


Resp 19 24  19


 


B/P (MAP)    135/77 (96)


 


Pulse Ox 100 100  100


 


O2 Delivery   Mechanical Ventilator Ventilator


 


O2 Flow Rate 40.0 40.0  


 


    





    





 7/3/20 7/3/20 7/3/20 7/3/20





 00:10 01:00 02:00 03:00


 


Pulse  82 83 83


 


Resp  19 22 22


 


B/P (MAP)  131/76 (94) 127/75 (92) 114/63 (80)


 


Pulse Ox 100 99 99 99


 


O2 Delivery Ventilator Ventilator Ventilator Ventilator





 7/3/20 7/3/20 7/3/20 7/3/20





 03:47 04:00 04:00 05:00


 


Temp  98.5  





  98.5  


 


Pulse  87  86


 


Resp  23  25


 


B/P (MAP)  118/64 (82)  132/72 (92)


 


Pulse Ox 100 99  99


 


O2 Delivery Ventilator Ventilator Mechanical Ventilator Ventilator


 


    





    





 7/3/20 7/3/20 7/3/20 7/3/20





 05:39 06:00 06:09 07:00


 


Pulse  83  80


 


Resp 18 26 26 12


 


B/P (MAP)  113/58 (76)  128/59 (82)


 


Pulse Ox 100 99 99 99


 


O2 Delivery Ventilator Ventilator  Ventilator


 


O2 Flow Rate 40.0  40.0 





 7/3/20 7/3/20 7/3/20 7/3/20





 07:15 07:30 07:45 08:00


 


Pulse 80 80 80 


 


B/P (MAP) 132/70 (90) 128/59 (82) 131/74 (93) 


 


O2 Delivery    Mechanical Ventilator





 7/3/20 7/3/20 7/3/20 7/3/20





 08:00 08:15 08:30 08:34


 


Temp 98.8   





 98.8   


 


Pulse 84 90 86 


 


Resp 14   


 


B/P (MAP) 102/61 (75) 94/53 (67) 102/61 (75) 


 


Pulse Ox 99   93


 


O2 Delivery Ventilator   Ventilator


 


    





    





 7/3/20 7/3/20 7/3/20 7/3/20





 08:45 09:00 09:15 09:30


 


Pulse 80 75 82 82


 


Resp  14  


 


B/P (MAP) 108/67 (81) 140/76 (97) 138/73 (94) 125/75 (92)


 


Pulse Ox  99  


 


O2 Delivery  Ventilator  





 7/3/20 7/3/20 7/3/20 7/3/20





 09:45 10:00 11:00 11:11


 


Pulse 82 82 83 


 


Resp  14 14 


 


B/P (MAP) 110/63 (79) 111/69 (83) 116/60 (78) 


 


Pulse Ox  99 99 99


 


O2 Delivery  Ventilator Ventilator Ventilator





 7/3/20   





 11:30   


 


Temp 98.2   





 98.2   


 


Pulse 87   


 


Resp 14   


 


B/P (MAP) 104/64 (77)   


 


Pulse Ox 95   


 


O2 Delivery Ventilator   














Intake and Output   


 


 7/2/20 7/2/20 7/3/20





 15:00 23:00 07:00


 


Intake Total 300 ml 1618.79 ml 100 ml


 


Output Total 545 ml 705 ml 1180 ml


 


Balance -245 ml 913.79 ml -1080 ml








Problem List


Problems


Medical Problems:


(1) Acute pancreatitis


Status: Acute  





(2) Cholelithiasis


Status: Acute  





Assessment


Severe biliary pancreatitis, s/p naif and drainage of retroperitoneal fluid 

collections.  If these were communicating pseudocysts, we may have the 

equivalent of pancreatic fistulae.


Plan of Care Note


Check fluid amylase.


Octreotide?  Particularly if very high fluid amylase.





Off the weekend.  Coverage available if needed.





Justicifation of Admission Dx:


Justifications for Admission:


Justification of Admission Dx:  Yes











MARCO ANTONIO LONGO MD           Jul 3, 2020 11:55

## 2020-07-04 VITALS — SYSTOLIC BLOOD PRESSURE: 96 MMHG | DIASTOLIC BLOOD PRESSURE: 57 MMHG

## 2020-07-04 VITALS — SYSTOLIC BLOOD PRESSURE: 97 MMHG | DIASTOLIC BLOOD PRESSURE: 53 MMHG

## 2020-07-04 VITALS — SYSTOLIC BLOOD PRESSURE: 107 MMHG | DIASTOLIC BLOOD PRESSURE: 56 MMHG

## 2020-07-04 VITALS — DIASTOLIC BLOOD PRESSURE: 51 MMHG | SYSTOLIC BLOOD PRESSURE: 92 MMHG

## 2020-07-04 VITALS — DIASTOLIC BLOOD PRESSURE: 64 MMHG | SYSTOLIC BLOOD PRESSURE: 114 MMHG

## 2020-07-04 VITALS — SYSTOLIC BLOOD PRESSURE: 110 MMHG | DIASTOLIC BLOOD PRESSURE: 59 MMHG

## 2020-07-04 VITALS — DIASTOLIC BLOOD PRESSURE: 56 MMHG | SYSTOLIC BLOOD PRESSURE: 98 MMHG

## 2020-07-04 VITALS — SYSTOLIC BLOOD PRESSURE: 102 MMHG | DIASTOLIC BLOOD PRESSURE: 56 MMHG

## 2020-07-04 VITALS — SYSTOLIC BLOOD PRESSURE: 100 MMHG | DIASTOLIC BLOOD PRESSURE: 60 MMHG

## 2020-07-04 VITALS — SYSTOLIC BLOOD PRESSURE: 93 MMHG | DIASTOLIC BLOOD PRESSURE: 50 MMHG

## 2020-07-04 VITALS — DIASTOLIC BLOOD PRESSURE: 43 MMHG | SYSTOLIC BLOOD PRESSURE: 85 MMHG

## 2020-07-04 VITALS — SYSTOLIC BLOOD PRESSURE: 103 MMHG | DIASTOLIC BLOOD PRESSURE: 56 MMHG

## 2020-07-04 VITALS — DIASTOLIC BLOOD PRESSURE: 51 MMHG | SYSTOLIC BLOOD PRESSURE: 90 MMHG

## 2020-07-04 VITALS — DIASTOLIC BLOOD PRESSURE: 57 MMHG | SYSTOLIC BLOOD PRESSURE: 99 MMHG

## 2020-07-04 VITALS — DIASTOLIC BLOOD PRESSURE: 47 MMHG | SYSTOLIC BLOOD PRESSURE: 87 MMHG

## 2020-07-04 VITALS — SYSTOLIC BLOOD PRESSURE: 106 MMHG | DIASTOLIC BLOOD PRESSURE: 57 MMHG

## 2020-07-04 VITALS — DIASTOLIC BLOOD PRESSURE: 48 MMHG | SYSTOLIC BLOOD PRESSURE: 93 MMHG

## 2020-07-04 VITALS — DIASTOLIC BLOOD PRESSURE: 57 MMHG | SYSTOLIC BLOOD PRESSURE: 100 MMHG

## 2020-07-04 VITALS — SYSTOLIC BLOOD PRESSURE: 93 MMHG | DIASTOLIC BLOOD PRESSURE: 47 MMHG

## 2020-07-04 VITALS — SYSTOLIC BLOOD PRESSURE: 106 MMHG | DIASTOLIC BLOOD PRESSURE: 56 MMHG

## 2020-07-04 VITALS — DIASTOLIC BLOOD PRESSURE: 54 MMHG | SYSTOLIC BLOOD PRESSURE: 102 MMHG

## 2020-07-04 VITALS — DIASTOLIC BLOOD PRESSURE: 52 MMHG | SYSTOLIC BLOOD PRESSURE: 94 MMHG

## 2020-07-04 VITALS — SYSTOLIC BLOOD PRESSURE: 98 MMHG | DIASTOLIC BLOOD PRESSURE: 48 MMHG

## 2020-07-04 VITALS — SYSTOLIC BLOOD PRESSURE: 111 MMHG | DIASTOLIC BLOOD PRESSURE: 62 MMHG

## 2020-07-04 VITALS — SYSTOLIC BLOOD PRESSURE: 108 MMHG | DIASTOLIC BLOOD PRESSURE: 59 MMHG

## 2020-07-04 VITALS — SYSTOLIC BLOOD PRESSURE: 101 MMHG | DIASTOLIC BLOOD PRESSURE: 57 MMHG

## 2020-07-04 VITALS — DIASTOLIC BLOOD PRESSURE: 56 MMHG | SYSTOLIC BLOOD PRESSURE: 96 MMHG

## 2020-07-04 VITALS — SYSTOLIC BLOOD PRESSURE: 105 MMHG | DIASTOLIC BLOOD PRESSURE: 56 MMHG

## 2020-07-04 VITALS — SYSTOLIC BLOOD PRESSURE: 100 MMHG | DIASTOLIC BLOOD PRESSURE: 54 MMHG

## 2020-07-04 VITALS — SYSTOLIC BLOOD PRESSURE: 110 MMHG | DIASTOLIC BLOOD PRESSURE: 55 MMHG

## 2020-07-04 VITALS — SYSTOLIC BLOOD PRESSURE: 98 MMHG | DIASTOLIC BLOOD PRESSURE: 67 MMHG

## 2020-07-04 VITALS — SYSTOLIC BLOOD PRESSURE: 99 MMHG | DIASTOLIC BLOOD PRESSURE: 54 MMHG

## 2020-07-04 VITALS — SYSTOLIC BLOOD PRESSURE: 94 MMHG | DIASTOLIC BLOOD PRESSURE: 64 MMHG

## 2020-07-04 LAB
ALBUMIN SERPL-MCNC: 0.9 G/DL (ref 3.4–5)
ALBUMIN/GLOB SERPL: 0.4 {RATIO} (ref 1–1.7)
ALP SERPL-CCNC: 210 U/L (ref 46–116)
ALT SERPL-CCNC: 68 U/L (ref 14–59)
ANION GAP SERPL CALC-SCNC: 2 MMOL/L (ref 6–14)
AST SERPL-CCNC: 28 U/L (ref 15–37)
BASOPHILS # BLD AUTO: 0.1 X10^3/UL (ref 0–0.2)
BASOPHILS NFR BLD: 0 % (ref 0–3)
BILIRUB SERPL-MCNC: 0.6 MG/DL (ref 0.2–1)
BUN SERPL-MCNC: 20 MG/DL (ref 7–20)
BUN/CREAT SERPL: 40 (ref 6–20)
CALCIUM SERPL-MCNC: 8.6 MG/DL (ref 8.5–10.1)
CHLORIDE SERPL-SCNC: 101 MMOL/L (ref 98–107)
CO2 SERPL-SCNC: 29 MMOL/L (ref 21–32)
CREAT SERPL-MCNC: 0.5 MG/DL (ref 0.6–1)
EOSINOPHIL NFR BLD: 0.2 X10^3/UL (ref 0–0.7)
EOSINOPHIL NFR BLD: 1 % (ref 0–3)
ERYTHROCYTE [DISTWIDTH] IN BLOOD BY AUTOMATED COUNT: 15.1 % (ref 11.5–14.5)
GFR SERPLBLD BASED ON 1.73 SQ M-ARVRAT: 131.1 ML/MIN
GLOBULIN SER-MCNC: 2.3 G/DL (ref 2.2–3.8)
GLUCOSE SERPL-MCNC: 130 MG/DL (ref 70–99)
HCT VFR BLD CALC: 22.3 % (ref 36–47)
HGB BLD-MCNC: 7.4 G/DL (ref 12–15.5)
LYMPHOCYTES # BLD: 1.3 X10^3/UL (ref 1–4.8)
LYMPHOCYTES NFR BLD AUTO: 5 % (ref 24–48)
MCH RBC QN AUTO: 30 PG (ref 25–35)
MCHC RBC AUTO-ENTMCNC: 33 G/DL (ref 31–37)
MCV RBC AUTO: 88 FL (ref 79–100)
MONO #: 1.2 X10^3/UL (ref 0–1.1)
MONOCYTES NFR BLD: 5 % (ref 0–9)
NEUT #: 23 X10^3/UL (ref 1.8–7.7)
NEUTROPHILS NFR BLD AUTO: 89 % (ref 31–73)
PLATELET # BLD AUTO: 269 X10^3/UL (ref 140–400)
POTASSIUM SERPL-SCNC: 4.2 MMOL/L (ref 3.5–5.1)
PROT SERPL-MCNC: 3.2 G/DL (ref 6.4–8.2)
RBC # BLD AUTO: 2.52 X10^6/UL (ref 3.5–5.4)
SODIUM SERPL-SCNC: 132 MMOL/L (ref 136–145)
WBC # BLD AUTO: 25.9 X10^3/UL (ref 4–11)

## 2020-07-04 RX ADMIN — ACETYLCYSTEINE SCH MG: 200 INHALANT RESPIRATORY (INHALATION) at 07:42

## 2020-07-04 RX ADMIN — Medication PRN EACH: at 10:33

## 2020-07-04 RX ADMIN — IPRATROPIUM BROMIDE AND ALBUTEROL SULFATE SCH ML: .5; 3 SOLUTION RESPIRATORY (INHALATION) at 23:59

## 2020-07-04 RX ADMIN — INSULIN LISPRO SCH UNITS: 100 INJECTION, SOLUTION INTRAVENOUS; SUBCUTANEOUS at 18:00

## 2020-07-04 RX ADMIN — MEROPENEM SCH MLS/HR: 500 INJECTION, POWDER, FOR SOLUTION INTRAVENOUS at 12:01

## 2020-07-04 RX ADMIN — IPRATROPIUM BROMIDE AND ALBUTEROL SULFATE SCH ML: .5; 3 SOLUTION RESPIRATORY (INHALATION) at 00:00

## 2020-07-04 RX ADMIN — DAPTOMYCIN SCH MLS/HR: 500 INJECTION, POWDER, LYOPHILIZED, FOR SOLUTION INTRAVENOUS at 20:03

## 2020-07-04 RX ADMIN — DEXTROSE PRN MLS/HR: 50 INJECTION, SOLUTION INTRAVENOUS at 02:03

## 2020-07-04 RX ADMIN — ACETYLCYSTEINE SCH MG: 200 INHALANT RESPIRATORY (INHALATION) at 18:32

## 2020-07-04 RX ADMIN — IPRATROPIUM BROMIDE AND ALBUTEROL SULFATE SCH ML: .5; 3 SOLUTION RESPIRATORY (INHALATION) at 12:05

## 2020-07-04 RX ADMIN — IPRATROPIUM BROMIDE AND ALBUTEROL SULFATE SCH ML: .5; 3 SOLUTION RESPIRATORY (INHALATION) at 15:01

## 2020-07-04 RX ADMIN — ENOXAPARIN SODIUM SCH MG: 40 INJECTION SUBCUTANEOUS at 09:35

## 2020-07-04 RX ADMIN — DEXTROSE PRN MLS/HR: 50 INJECTION, SOLUTION INTRAVENOUS at 13:24

## 2020-07-04 RX ADMIN — MEROPENEM SCH MLS/HR: 500 INJECTION, POWDER, FOR SOLUTION INTRAVENOUS at 00:00

## 2020-07-04 RX ADMIN — MEROPENEM SCH MLS/HR: 500 INJECTION, POWDER, FOR SOLUTION INTRAVENOUS at 06:15

## 2020-07-04 RX ADMIN — BACITRACIN SCH MLS/HR: 5000 INJECTION, POWDER, FOR SOLUTION INTRAMUSCULAR at 12:37

## 2020-07-04 RX ADMIN — INSULIN LISPRO SCH UNITS: 100 INJECTION, SOLUTION INTRAVENOUS; SUBCUTANEOUS at 06:00

## 2020-07-04 RX ADMIN — Medication PRN MLS/HR: at 14:00

## 2020-07-04 RX ADMIN — IPRATROPIUM BROMIDE AND ALBUTEROL SULFATE SCH ML: .5; 3 SOLUTION RESPIRATORY (INHALATION) at 18:32

## 2020-07-04 RX ADMIN — MEROPENEM SCH MLS/HR: 500 INJECTION, POWDER, FOR SOLUTION INTRAVENOUS at 18:18

## 2020-07-04 RX ADMIN — INSULIN LISPRO SCH UNITS: 100 INJECTION, SOLUTION INTRAVENOUS; SUBCUTANEOUS at 11:51

## 2020-07-04 RX ADMIN — DEXTROSE SCH MLS/HR: 50 INJECTION, SOLUTION INTRAVENOUS at 07:51

## 2020-07-04 RX ADMIN — IPRATROPIUM BROMIDE AND ALBUTEROL SULFATE SCH ML: .5; 3 SOLUTION RESPIRATORY (INHALATION) at 07:42

## 2020-07-04 RX ADMIN — IPRATROPIUM BROMIDE AND ALBUTEROL SULFATE SCH ML: .5; 3 SOLUTION RESPIRATORY (INHALATION) at 04:00

## 2020-07-04 RX ADMIN — PANTOPRAZOLE SODIUM SCH MG: 40 INJECTION, POWDER, FOR SOLUTION INTRAVENOUS at 07:50

## 2020-07-04 NOTE — PDOC
Infectious Disease Note


Subjective


Subjective


Sedated 


Remains on ventilator support, FiO2 40% 


No fevers last 72 hrs


TPN


Still on vasopressin, off levophed





ROS


ROS


unobtainable due to patient's condition





Vital Sign


Vital Signs





Vital Signs








  Date Time  Temp Pulse Resp B/P (MAP) Pulse Ox O2 Delivery O2 Flow Rate FiO2


 


7/4/20 09:00  90 17 107/56 (73) 99 Ventilator  


 


7/4/20 08:00 98.6       





 98.6       











Physical Exam


PHYSICAL EXAM


GENERAL: Sedated, ill appearing


HEENT: Pupils equal, oral cavity dry. + NGT


NECK:  Tracheostomy 


LUNGS: Diminished aeration bases,  CT on left 


HEART:  S1, S2, regular w/ PVCs 


ABDOMEN: Distentied,  bowel sounds hypoactive, soft, gildardo x2, 3 ROBERT drains, 

G-J tube and + wound vac 


: [place] in place 


EXTREMITIES: Generalized edema, no cyanosis. SCDs & Podus boots bilaterally  


SKIN: warm touch. No signs of rash.  


LUE-PICC without signs of complications 


LUE art-line out, mottling left forearm, some better. RP palpable, cap refill 

brisk.





Labs


Lab





Laboratory Tests








Test


 7/3/20


10:16 7/3/20


12:26 7/3/20


18:06 7/3/20


23:56


 


Sodium Level


 133 mmol/L


(136-145) 


 


 





 


Potassium Level


 3.9 mmol/L


(3.5-5.1) 


 


 





 


Chloride Level


 102 mmol/L


() 


 


 





 


Carbon Dioxide Level


 28 mmol/L


(21-32) 


 


 





 


Anion Gap 3 (6-14)    


 


Blood Urea Nitrogen


 20 mg/dL


(7-20) 


 


 





 


Creatinine


 0.6 mg/dL


(0.6-1.0) 


 


 





 


Estimated GFR


(Cockcroft-Gault) 106.3 


 


 


 





 


BUN/Creatinine Ratio 33 (6-20)    


 


Glucose Level


 224 mg/dL


(70-99) 


 


 





 


Calcium Level


 8.6 mg/dL


(8.5-10.1) 


 


 





 


Total Bilirubin


 0.6 mg/dL


(0.2-1.0) 


 


 





 


Aspartate Amino Transf


(AST/SGOT) 42 U/L (15-37) 


 


 


 





 


Alanine Aminotransferase


(ALT/SGPT) 98 U/L (14-59) 


 


 


 





 


Alkaline Phosphatase


 199 U/L


() 


 


 





 


Total Protein


 3.7 g/dL


(6.4-8.2) 


 


 





 


Albumin


 1.0 g/dL


(3.4-5.0) 


 


 





 


Albumin/Globulin Ratio 0.4 (1.0-1.7)    


 


Glucose (Fingerstick)


 


 179 mg/dL


(70-99) 143 mg/dL


(70-99) 136 mg/dL


(70-99)


 


Test


 7/4/20


06:12 7/4/20


06:15 


 





 


Glucose (Fingerstick)


 141 mg/dL


(70-99) 


 


 





 


White Blood Count


 


 25.9 x10^3/uL


(4.0-11.0) 


 





 


Red Blood Count


 


 2.52 x10^6/uL


(3.50-5.40) 


 





 


Hemoglobin


 


 7.4 g/dL


(12.0-15.5) 


 





 


Hematocrit


 


 22.3 %


(36.0-47.0) 


 





 


Mean Corpuscular Volume  88 fL ()   


 


Mean Corpuscular Hemoglobin  30 pg (25-35)   


 


Mean Corpuscular Hemoglobin


Concent 


 33 g/dL


(31-37) 


 





 


Red Cell Distribution Width


 


 15.1 %


(11.5-14.5) 


 





 


Platelet Count


 


 269 x10^3/uL


(140-400) 


 





 


Neutrophils (%) (Auto)  89 % (31-73)   


 


Lymphocytes (%) (Auto)  5 % (24-48)   


 


Monocytes (%) (Auto)  5 % (0-9)   


 


Eosinophils (%) (Auto)  1 % (0-3)   


 


Basophils (%) (Auto)  0 % (0-3)   


 


Neutrophils # (Auto)


 


 23.0 x10^3/uL


(1.8-7.7) 


 





 


Lymphocytes # (Auto)


 


 1.3 x10^3/uL


(1.0-4.8) 


 





 


Monocytes # (Auto)


 


 1.2 x10^3/uL


(0.0-1.1) 


 





 


Eosinophils # (Auto)


 


 0.2 x10^3/uL


(0.0-0.7) 


 





 


Basophils # (Auto)


 


 0.1 x10^3/uL


(0.0-0.2) 


 





 


Sodium Level


 


 132 mmol/L


(136-145) 


 





 


Potassium Level


 


 4.2 mmol/L


(3.5-5.1) 


 





 


Chloride Level


 


 101 mmol/L


() 


 





 


Carbon Dioxide Level


 


 29 mmol/L


(21-32) 


 





 


Anion Gap  2 (6-14)   


 


Blood Urea Nitrogen


 


 20 mg/dL


(7-20) 


 





 


Creatinine


 


 0.5 mg/dL


(0.6-1.0) 


 





 


Estimated GFR


(Cockcroft-Gault) 


 131.1 


 


 





 


BUN/Creatinine Ratio  40 (6-20)   


 


Glucose Level


 


 130 mg/dL


(70-99) 


 





 


Calcium Level


 


 8.6 mg/dL


(8.5-10.1) 


 





 


Total Bilirubin


 


 0.6 mg/dL


(0.2-1.0) 


 





 


Aspartate Amino Transf


(AST/SGOT) 


 28 U/L (15-37) 


 


 





 


Alanine Aminotransferase


(ALT/SGPT) 


 68 U/L (14-59) 


 


 





 


Alkaline Phosphatase


 


 210 U/L


() 


 





 


Total Protein


 


 3.2 g/dL


(6.4-8.2) 


 





 


Albumin


 


 0.9 g/dL


(3.4-5.0) 


 





 


Albumin/Globulin Ratio  0.4 (1.0-1.7)   








Micro


6/28. BLOOD CULTURE  Preliminary  


        NO GROWTH AFTER 5 DAYS                                 








6/30.  GRAM STAIN  Final  


        Final





        SQUAMOUS EPI CELL:RARE


        PMN (WBCs):FEW


        YEAST:FEW


        


 ANAEROBIC-AEROBIC CULTURE  Preliminary  


        Preliminary





        MANY YEAST on 07/03/20 at 1354


        FINAL ID= [NAHID PARAPSILOSIS]


        NAHID PARAPSILOSIS





Objective


Assessment


Patient with prolonged hospitalization more than 3 months


Multiple medical problems


Multiple surgical procedures





S/P Exp. Lap, REN, subtotal cholecystectomy with cholangiogram, G-J tube 

placement & pancreatic necrosectomy on June 30 C. parapsilosis 


Leucocytosis - leukomoid reaction likely postoperative, improving 


Fever intermittently source likely GI, imrpoved 


Acute gallstone pancreatitis with persistent necrosis


  -CT a/p 4/9.  Increased ascites. Persistent evidence of necrotizing 

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


           - 4/27. status post ROBERT drain placement; C. parapsilosis. s/p drain 

5/6 + yeast & high amylase; s/p additional drain on 5/8. Drains removed. 


           -5/6. fluid  candida parapsilosis fluid, amylase high


           - 6/6 showed multiple pseudocysts, slight larger on the right. s/p 

drains x 3, 6/7.  + PSAE (MDRO-R Cefepime, Zosyn ANSON < 64) and yeast, 


           -6/7 s/p drain replacement x 3; fluid cult PSAE (MDRO), yeast; 

treated


Ascites s/p paracentesis 4/15 & 5/6. C. parapsilosis 


Cholelithiasis with thickening of the gallbladder wall.


JED, Hyperkalemia, Metabolic acidosis off dialysis


Acute hypoxic resp failure. trach/vent. sputum 6/13  + PSAE (I merrem) 


Pleural effusions s/p left thoracentesis, 5/12. no culture. s/p left chest tube,

6/15 no growth


Hypocalcemia 


Prediabetes


HTN


Anemia s/p PRBCs





Plan


Plan of Care


continue dapto, merrem and micafungin


Last CK 17


Monitor labs


wound care /drain management as directed


Monitor left forearm








Contact isolation for CRE/MDRO


Critically ill





D/w nursing 





Patient discussed in detail with NP. Chart reviewed in detail. Above plan co-

formulated and agreed upon with NP on 7/4/2020











HAVEN WINTERS         Jul 4, 2020 09:26


BISMARK HERNANDEZ MD              Jul 5, 2020 19:36

## 2020-07-04 NOTE — NUR
Pharmacy TPN Dosing Note



S: JESENIA BEAN is a 49 year old F Currently receiving Central Continuous TPN started 
03/18/20



B:Pertinent PMH: Necrotizing pancreatitis

Current diet: NPO 



LABS:

Sodium:    132 

Potassium: 4.2 

Chloride:  101 

Calcium:   8.6 

Corrected Calcium: 11.08 

Magnesium: 2.1 

CO2:       29 

SCr:       0.5 

Glucose:   141 

Albumin:   0.9 

AST:       28 

ALT:       68 



TPN FORMULA:

TPN TYPE:  Central Continuous

AMINO ACIDS:         80 gm

DEXTROSE:            250 gm

LIPIDS:              20 gm

SODIUM CHLORIDE:     100 mEq

SODIUM ACETATE:      - mEq

SODIUM PHOSPHATE:    - mmol

POTASSIUM CHLORIDE:  30 mEq

POTASSIUM ACETATE:   30 mEq

POTASSIUM PHOSPHATE: - mmol

MAGNESIUM:           12 mEq

CALCIUM:             - mEq

INSULIN:             15 units

MULTIPLE VITAMIN:    10 ml

TRACE ELEMENTS:      1 ml ml(s)



TPN PLAN:  7/4 decrease rate 2'hyponatremia, REPEAT LABS IN AM





R: Continue TPN 

Will monitor electrolytes, glucose, and tolerance to TPN.



 MURPHY ACOSTA [facility], 07/04/20 1038

## 2020-07-04 NOTE — PDOC
PULMONARY PROGRESS NOTES


Subjective


Remains A/C mode and sedated,


continues with TPN/TF and vasopressin 


S/P Exploratory laparotomy, lysis of adhesions, subtotal cholecystectomy with 

cholangiogram, gastrojejunostomy tube placement, pancreatic necrosectomy on 6/30


no overnight concerns from nursing 





Vitals





Vital Signs








  Date Time  Temp Pulse Resp B/P (MAP) Pulse Ox O2 Delivery O2 Flow Rate FiO2


 


7/4/20 07:00  78 14 99/57 (71) 99 Ventilator  


 


7/4/20 04:00 98.9       





 98.9       








Comments


trach/sedated on vent


Lungs:  Crackles


Cardiovascular:  S1, S2


Abdomen:  Soft, Non-tender, Other (multiple ROBERT drains )


Extremities:  Other (+1 BLE edema)


Skin:  Warm


Labs





Laboratory Tests








Test


 7/2/20


08:00 7/2/20


12:43 7/2/20


18:10 7/3/20


05:30


 


O2 Saturation 98 % (92-99)    


 


Arterial Blood pH


 7.36


(7.35-7.45) 


 


 





 


Arterial Blood pCO2 at


Patient Temp 41 mmHg


(35-46) 


 


 





 


Arterial Blood pO2 at Patient


Temp 127 mmHg


() 


 


 





 


Arterial Blood HCO3


 23 mmol/L


(21-28) 


 


 





 


Arterial Blood Base Excess


 -2 mmol/L


(-3-3) 


 


 





 


FiO2 40    


 


Glucose (Fingerstick)


 


 184 mg/dL


(70-99) 176 mg/dL


(70-99) 





 


White Blood Count


 


 


 


 29.7 x10^3/uL


(4.0-11.0)


 


Red Blood Count


 


 


 


 2.85 x10^6/uL


(3.50-5.40)


 


Hemoglobin


 


 


 


 8.3 g/dL


(12.0-15.5)


 


Hematocrit


 


 


 


 25.0 %


(36.0-47.0)


 


Mean Corpuscular Volume    88 fL () 


 


Mean Corpuscular Hemoglobin    29 pg (25-35) 


 


Mean Corpuscular Hemoglobin


Concent 


 


 


 33 g/dL


(31-37)


 


Red Cell Distribution Width


 


 


 


 15.0 %


(11.5-14.5)


 


Platelet Count


 


 


 


 260 x10^3/uL


(140-400)


 


Neutrophils (%) (Auto)    90 % (31-73) 


 


Lymphocytes (%) (Auto)    4 % (24-48) 


 


Monocytes (%) (Auto)    5 % (0-9) 


 


Eosinophils (%) (Auto)    1 % (0-3) 


 


Basophils (%) (Auto)    0 % (0-3) 


 


Neutrophils # (Auto)


 


 


 


 26.6 x10^3/uL


(1.8-7.7)


 


Lymphocytes # (Auto)


 


 


 


 1.3 x10^3/uL


(1.0-4.8)


 


Monocytes # (Auto)


 


 


 


 1.5 x10^3/uL


(0.0-1.1)


 


Eosinophils # (Auto)


 


 


 


 0.3 x10^3/uL


(0.0-0.7)


 


Basophils # (Auto)


 


 


 


 0.0 x10^3/uL


(0.0-0.2)


 


Segmented Neutrophils %    87 % (35-66) 


 


Band Neutrophils %    7 % (0-9) 


 


Lymphocytes %    4 % (24-48) 


 


Monocytes %    2 % (0-10) 


 


Platelet Estimate


 


 


 


 Adequate


(ADEQUATE)


 


Polychromasia    Present 


 


Anisocytosis    Present 


 


Creatine Kinase


 


 


 


 17 U/L


()


 


Test


 7/3/20


06:01 7/3/20


08:00 7/3/20


10:16 7/3/20


12:26


 


Glucose (Fingerstick)


 150 mg/dL


(70-99) 


 


 179 mg/dL


(70-99)


 


O2 Saturation  85 % (92-99)   


 


Arterial Blood pH


 


 7.36


(7.35-7.45) 


 





 


Arterial Blood pCO2 at


Patient Temp 


 48 mmHg


(35-46) 


 





 


Arterial Blood pO2 at Patient


Temp 


 54 mmHg


() 


 





 


Arterial Blood HCO3


 


 27 mmol/L


(21-28) 


 





 


Arterial Blood Base Excess


 


 1 mmol/L


(-3-3) 


 





 


FiO2  40   


 


Sodium Level


 


 


 133 mmol/L


(136-145) 





 


Potassium Level


 


 


 3.9 mmol/L


(3.5-5.1) 





 


Chloride Level


 


 


 102 mmol/L


() 





 


Carbon Dioxide Level


 


 


 28 mmol/L


(21-32) 





 


Anion Gap   3 (6-14)  


 


Blood Urea Nitrogen


 


 


 20 mg/dL


(7-20) 





 


Creatinine


 


 


 0.6 mg/dL


(0.6-1.0) 





 


Estimated GFR


(Cockcroft-Gault) 


 


 106.3 


 





 


BUN/Creatinine Ratio   33 (6-20)  


 


Glucose Level


 


 


 224 mg/dL


(70-99) 





 


Calcium Level


 


 


 8.6 mg/dL


(8.5-10.1) 





 


Total Bilirubin


 


 


 0.6 mg/dL


(0.2-1.0) 





 


Aspartate Amino Transf


(AST/SGOT) 


 


 42 U/L (15-37) 


 





 


Alanine Aminotransferase


(ALT/SGPT) 


 


 98 U/L (14-59) 


 





 


Alkaline Phosphatase


 


 


 199 U/L


() 





 


Total Protein


 


 


 3.7 g/dL


(6.4-8.2) 





 


Albumin


 


 


 1.0 g/dL


(3.4-5.0) 





 


Albumin/Globulin Ratio   0.4 (1.0-1.7)  


 


Test


 7/3/20


18:06 7/3/20


23:56 7/4/20


06:12 7/4/20


06:15


 


Glucose (Fingerstick)


 143 mg/dL


(70-99) 136 mg/dL


(70-99) 141 mg/dL


(70-99) 





 


White Blood Count


 


 


 


 25.9 x10^3/uL


(4.0-11.0)


 


Red Blood Count


 


 


 


 2.52 x10^6/uL


(3.50-5.40)


 


Hemoglobin


 


 


 


 7.4 g/dL


(12.0-15.5)


 


Hematocrit


 


 


 


 22.3 %


(36.0-47.0)


 


Mean Corpuscular Volume    88 fL () 


 


Mean Corpuscular Hemoglobin    30 pg (25-35) 


 


Mean Corpuscular Hemoglobin


Concent 


 


 


 33 g/dL


(31-37)


 


Red Cell Distribution Width


 


 


 


 15.1 %


(11.5-14.5)


 


Platelet Count


 


 


 


 269 x10^3/uL


(140-400)


 


Neutrophils (%) (Auto)    89 % (31-73) 


 


Lymphocytes (%) (Auto)    5 % (24-48) 


 


Monocytes (%) (Auto)    5 % (0-9) 


 


Eosinophils (%) (Auto)    1 % (0-3) 


 


Basophils (%) (Auto)    0 % (0-3) 


 


Neutrophils # (Auto)


 


 


 


 23.0 x10^3/uL


(1.8-7.7)


 


Lymphocytes # (Auto)


 


 


 


 1.3 x10^3/uL


(1.0-4.8)


 


Monocytes # (Auto)


 


 


 


 1.2 x10^3/uL


(0.0-1.1)


 


Eosinophils # (Auto)


 


 


 


 0.2 x10^3/uL


(0.0-0.7)


 


Basophils # (Auto)


 


 


 


 0.1 x10^3/uL


(0.0-0.2)


 


Sodium Level


 


 


 


 132 mmol/L


(136-145)


 


Potassium Level


 


 


 


 4.2 mmol/L


(3.5-5.1)


 


Chloride Level


 


 


 


 101 mmol/L


()


 


Carbon Dioxide Level


 


 


 


 29 mmol/L


(21-32)


 


Anion Gap    2 (6-14) 


 


Blood Urea Nitrogen


 


 


 


 20 mg/dL


(7-20)


 


Creatinine


 


 


 


 0.5 mg/dL


(0.6-1.0)


 


Estimated GFR


(Cockcroft-Gault) 


 


 


 131.1 





 


BUN/Creatinine Ratio    40 (6-20) 


 


Glucose Level


 


 


 


 130 mg/dL


(70-99)


 


Calcium Level


 


 


 


 8.6 mg/dL


(8.5-10.1)


 


Total Bilirubin


 


 


 


 0.6 mg/dL


(0.2-1.0)


 


Aspartate Amino Transf


(AST/SGOT) 


 


 


 28 U/L (15-37) 





 


Alanine Aminotransferase


(ALT/SGPT) 


 


 


 68 U/L (14-59) 





 


Alkaline Phosphatase


 


 


 


 210 U/L


()


 


Total Protein


 


 


 


 3.2 g/dL


(6.4-8.2)


 


Albumin


 


 


 


 0.9 g/dL


(3.4-5.0)


 


Albumin/Globulin Ratio    0.4 (1.0-1.7) 








Laboratory Tests








Test


 7/3/20


08:00 7/3/20


10:16 7/3/20


12:26 7/3/20


18:06


 


O2 Saturation 85 % (92-99)    


 


Arterial Blood pH


 7.36


(7.35-7.45) 


 


 





 


Arterial Blood pCO2 at


Patient Temp 48 mmHg


(35-46) 


 


 





 


Arterial Blood pO2 at Patient


Temp 54 mmHg


() 


 


 





 


Arterial Blood HCO3


 27 mmol/L


(21-28) 


 


 





 


Arterial Blood Base Excess


 1 mmol/L


(-3-3) 


 


 





 


FiO2 40    


 


Sodium Level


 


 133 mmol/L


(136-145) 


 





 


Potassium Level


 


 3.9 mmol/L


(3.5-5.1) 


 





 


Chloride Level


 


 102 mmol/L


() 


 





 


Carbon Dioxide Level


 


 28 mmol/L


(21-32) 


 





 


Anion Gap  3 (6-14)   


 


Blood Urea Nitrogen


 


 20 mg/dL


(7-20) 


 





 


Creatinine


 


 0.6 mg/dL


(0.6-1.0) 


 





 


Estimated GFR


(Cockcroft-Gault) 


 106.3 


 


 





 


BUN/Creatinine Ratio  33 (6-20)   


 


Glucose Level


 


 224 mg/dL


(70-99) 


 





 


Calcium Level


 


 8.6 mg/dL


(8.5-10.1) 


 





 


Total Bilirubin


 


 0.6 mg/dL


(0.2-1.0) 


 





 


Aspartate Amino Transf


(AST/SGOT) 


 42 U/L (15-37) 


 


 





 


Alanine Aminotransferase


(ALT/SGPT) 


 98 U/L (14-59) 


 


 





 


Alkaline Phosphatase


 


 199 U/L


() 


 





 


Total Protein


 


 3.7 g/dL


(6.4-8.2) 


 





 


Albumin


 


 1.0 g/dL


(3.4-5.0) 


 





 


Albumin/Globulin Ratio  0.4 (1.0-1.7)   


 


Glucose (Fingerstick)


 


 


 179 mg/dL


(70-99) 143 mg/dL


(70-99)


 


Test


 7/3/20


23:56 7/4/20


06:12 7/4/20


06:15 





 


Glucose (Fingerstick)


 136 mg/dL


(70-99) 141 mg/dL


(70-99) 


 





 


White Blood Count


 


 


 25.9 x10^3/uL


(4.0-11.0) 





 


Red Blood Count


 


 


 2.52 x10^6/uL


(3.50-5.40) 





 


Hemoglobin


 


 


 7.4 g/dL


(12.0-15.5) 





 


Hematocrit


 


 


 22.3 %


(36.0-47.0) 





 


Mean Corpuscular Volume   88 fL ()  


 


Mean Corpuscular Hemoglobin   30 pg (25-35)  


 


Mean Corpuscular Hemoglobin


Concent 


 


 33 g/dL


(31-37) 





 


Red Cell Distribution Width


 


 


 15.1 %


(11.5-14.5) 





 


Platelet Count


 


 


 269 x10^3/uL


(140-400) 





 


Neutrophils (%) (Auto)   89 % (31-73)  


 


Lymphocytes (%) (Auto)   5 % (24-48)  


 


Monocytes (%) (Auto)   5 % (0-9)  


 


Eosinophils (%) (Auto)   1 % (0-3)  


 


Basophils (%) (Auto)   0 % (0-3)  


 


Neutrophils # (Auto)


 


 


 23.0 x10^3/uL


(1.8-7.7) 





 


Lymphocytes # (Auto)


 


 


 1.3 x10^3/uL


(1.0-4.8) 





 


Monocytes # (Auto)


 


 


 1.2 x10^3/uL


(0.0-1.1) 





 


Eosinophils # (Auto)


 


 


 0.2 x10^3/uL


(0.0-0.7) 





 


Basophils # (Auto)


 


 


 0.1 x10^3/uL


(0.0-0.2) 





 


Sodium Level


 


 


 132 mmol/L


(136-145) 





 


Potassium Level


 


 


 4.2 mmol/L


(3.5-5.1) 





 


Chloride Level


 


 


 101 mmol/L


() 





 


Carbon Dioxide Level


 


 


 29 mmol/L


(21-32) 





 


Anion Gap   2 (6-14)  


 


Blood Urea Nitrogen


 


 


 20 mg/dL


(7-20) 





 


Creatinine


 


 


 0.5 mg/dL


(0.6-1.0) 





 


Estimated GFR


(Cockcroft-Gault) 


 


 131.1 


 





 


BUN/Creatinine Ratio   40 (6-20)  


 


Glucose Level


 


 


 130 mg/dL


(70-99) 





 


Calcium Level


 


 


 8.6 mg/dL


(8.5-10.1) 





 


Total Bilirubin


 


 


 0.6 mg/dL


(0.2-1.0) 





 


Aspartate Amino Transf


(AST/SGOT) 


 


 28 U/L (15-37) 


 





 


Alanine Aminotransferase


(ALT/SGPT) 


 


 68 U/L (14-59) 


 





 


Alkaline Phosphatase


 


 


 210 U/L


() 





 


Total Protein


 


 


 3.2 g/dL


(6.4-8.2) 





 


Albumin


 


 


 0.9 g/dL


(3.4-5.0) 





 


Albumin/Globulin Ratio   0.4 (1.0-1.7)  








Medications





Active Scripts








 Medications  Dose


 Route/Sig


 Max Daily Dose Days Date Category


 


 Bisoprolol


 Fumarate 5 Mg


 Tablet  10 Mg


 PO DAILY


   3/16/20 Reported








Comments


 CXR 6/28/20


IMPRESSION:


No significant interval change compared to 6/25/2020.


 











ct abdomen /pelvis 6/6


1. Removal of the percutaneous pigtail drainage catheters since the prior 


exam. Sequela of pancreatitis with extensive pseudocysts again 


demonstrated, the right-sided collections are slightly larger since the 


prior exam, the left-sided collections are stable. See above.


2. Moderate to large left pleural effusion with atelectasis and collapse 


of most of the left lower lobe, stable. Small right pleural effusion is 


stable.


3. Gallstone.





ct chest 6/15 reviewed








 GRAM NEG COCCOBACILLI:MANY


        SQUAMOUS EPI CELL:RARE


        PMN (WBCs):FEW


        Unless otherwise specified, Testing Performed by:


        [address] [address]


        For Inquires, the Physician may contact the Microbiology


        department at [phone]





  RESPIRATORY CULTURE  Final  


        Final





        MANY GRAM NEGATIVE RODS on 06/15/20 at 1103


        FINAL ID= [PSEUDOMONAS AERUGINOSA]


        MICRO CHARGES


        PSEUDOMONAS AERUGINOSA





  ANTIMICROBIAL SUSCEPTIBILITY  Final  


        Comment





        NEG ANSON 56


        PSEUDOMONAS AERUGINOSA


        ANTIBIOTIC                        RESULT          INTERPRETATION


        AMIKACIN                          <=16                  S


        AZTREONAM                         <=4                   S


        CEFTAZIDIME                       <=1                   S


        CIPROFLOXACIN                     <=0.25                S


        CEFEPIME                          <=2                   S


        CEFTAZIDIME/AVIBACTAM             <=4                   S


        GENTAMICIN                        <=2                   S


        LEVOFLOXACIN                      <=0.5                 S





Impression


.





IMPRESSION:


1.  Acute hypoxemic respiratory failure secondary to ARDS status post trach, 

developed anemia 6/7, blood drainage from RLQ abdomen drain site, and 

surrounding firmness  / developed septic shock 6/7 from abdomen source, required

levo 6/7


s/p 3 new drains 6/7 with brown color drainage,  


2.  Gallstone pancreatitis, now with ongoing bleeding from prior drain. Anemic. 

s/p Tx multiple units over several days


3.  septic shock/sepsis, recurrent 6/7, source abdomen. ,


4.  Acute kidney injury-, Off HD--renal function decling. suspect JED on CKD due

to hypotension , improved now


5.  Acute gallstone pancreatitis.


6.  Hypoalbuminemia.


7.  Moderate persistent effusions, s/p left thora  5/12, reaccumulation of left 

effusion. O2 requirement not changed. 


8.  Fever- ,hypotension. suspect recurrent sepsis/ likely pancreatic source.  

Per ID, per surgery--


9.  Chronic anemia-- ongoing / s/p PRBC


10. Covid 19 testing negative


11. Moderate to large ascites-S/P paracentisis


12.S/P paracentisis with 4 liters removed on 4/15/20


13. S/P IR drain placement on 5/8/2020, removal, re inserted 6/7


14. Depression/Anxiety 


15. Increase effusion, ? loculated/ s/p chest tube.. drainage slowing down. 





6/30


S/P Exploratory laparotomy, lysis of adhesions, subtotal cholecystectomy with 

cholangiogram, gastrojejunostomy tube placement, pancreatic necrosectomy


leukocytosis- improving





Plan


.


Continue current vent support in A/C mode, folloow ABG and CXR-- not ready for 

weaning 


S/P Exploratory laparotomy, lysis of adhesions, subtotal cholecystectomy with 

cholangiogram, gastrojejunostomy tube placement, pancreatic necrosectomy with 

multiple drains in place-- on 6/30 


ABX per ID 


Follow surgery recs


Continue TF and TPN for nutrition support 


Vasopressors for hypotension to keep MAP above 65 


DVT/GI PPX


D/W RN and RT 








CC time 30 minutes











STEVE MIRANDA MD               Jul 4, 2020 07:38

## 2020-07-04 NOTE — PDOC
SURGICAL PROGRESS NOTE


Subjective


Patient sedated and ventilated


Vital Signs





Vital Signs








  Date Time  Temp Pulse Resp B/P (MAP) Pulse Ox O2 Delivery O2 Flow Rate FiO2


 


7/4/20 09:00  90 17 107/56 (73) 99 Ventilator  


 


7/4/20 08:00 98.6       





 98.6       








I&O











Intake and Output 


 


 7/4/20





 07:00


 


Intake Total 2949.25 ml


 


Output Total 2351 ml


 


Balance 598.25 ml


 


 


 


IV Total 2474.25 ml


 


Tube Feeding 475 ml


 


Output Urine Total 1061 ml


 


Gastric Drainage Total 150 ml


 


Chest Tube Drainage Total 50 ml


 


Drainage Total 1090 ml








PATIENT HAS A VILLASENOR:  Yes


General:  Other (Sedated)


Abdomen:  Soft, No tenderness, Other (Wounds clean dry and intact, large Pleur-

evac drains with minimal output ROBERT drain with large serous output)


Labs





Laboratory Tests








Test


 7/2/20


12:43 7/2/20


18:10 7/3/20


05:30 7/3/20


06:01


 


Glucose (Fingerstick)


 184 mg/dL


(70-99) 176 mg/dL


(70-99) 


 150 mg/dL


(70-99)


 


White Blood Count


 


 


 29.7 x10^3/uL


(4.0-11.0) 





 


Red Blood Count


 


 


 2.85 x10^6/uL


(3.50-5.40) 





 


Hemoglobin


 


 


 8.3 g/dL


(12.0-15.5) 





 


Hematocrit


 


 


 25.0 %


(36.0-47.0) 





 


Mean Corpuscular Volume   88 fL ()  


 


Mean Corpuscular Hemoglobin   29 pg (25-35)  


 


Mean Corpuscular Hemoglobin


Concent 


 


 33 g/dL


(31-37) 





 


Red Cell Distribution Width


 


 


 15.0 %


(11.5-14.5) 





 


Platelet Count


 


 


 260 x10^3/uL


(140-400) 





 


Neutrophils (%) (Auto)   90 % (31-73)  


 


Lymphocytes (%) (Auto)   4 % (24-48)  


 


Monocytes (%) (Auto)   5 % (0-9)  


 


Eosinophils (%) (Auto)   1 % (0-3)  


 


Basophils (%) (Auto)   0 % (0-3)  


 


Neutrophils # (Auto)


 


 


 26.6 x10^3/uL


(1.8-7.7) 





 


Lymphocytes # (Auto)


 


 


 1.3 x10^3/uL


(1.0-4.8) 





 


Monocytes # (Auto)


 


 


 1.5 x10^3/uL


(0.0-1.1) 





 


Eosinophils # (Auto)


 


 


 0.3 x10^3/uL


(0.0-0.7) 





 


Basophils # (Auto)


 


 


 0.0 x10^3/uL


(0.0-0.2) 





 


Segmented Neutrophils %   87 % (35-66)  


 


Band Neutrophils %   7 % (0-9)  


 


Lymphocytes %   4 % (24-48)  


 


Monocytes %   2 % (0-10)  


 


Platelet Estimate


 


 


 Adequate


(ADEQUATE) 





 


Polychromasia   Present  


 


Anisocytosis   Present  


 


Creatine Kinase


 


 


 17 U/L


() 





 


Test


 7/3/20


08:00 7/3/20


10:16 7/3/20


12:26 7/3/20


18:06


 


O2 Saturation 85 % (92-99)    


 


Arterial Blood pH


 7.36


(7.35-7.45) 


 


 





 


Arterial Blood pCO2 at


Patient Temp 48 mmHg


(35-46) 


 


 





 


Arterial Blood pO2 at Patient


Temp 54 mmHg


() 


 


 





 


Arterial Blood HCO3


 27 mmol/L


(21-28) 


 


 





 


Arterial Blood Base Excess


 1 mmol/L


(-3-3) 


 


 





 


FiO2 40    


 


Sodium Level


 


 133 mmol/L


(136-145) 


 





 


Potassium Level


 


 3.9 mmol/L


(3.5-5.1) 


 





 


Chloride Level


 


 102 mmol/L


() 


 





 


Carbon Dioxide Level


 


 28 mmol/L


(21-32) 


 





 


Anion Gap  3 (6-14)   


 


Blood Urea Nitrogen


 


 20 mg/dL


(7-20) 


 





 


Creatinine


 


 0.6 mg/dL


(0.6-1.0) 


 





 


Estimated GFR


(Cockcroft-Gault) 


 106.3 


 


 





 


BUN/Creatinine Ratio  33 (6-20)   


 


Glucose Level


 


 224 mg/dL


(70-99) 


 





 


Calcium Level


 


 8.6 mg/dL


(8.5-10.1) 


 





 


Total Bilirubin


 


 0.6 mg/dL


(0.2-1.0) 


 





 


Aspartate Amino Transf


(AST/SGOT) 


 42 U/L (15-37) 


 


 





 


Alanine Aminotransferase


(ALT/SGPT) 


 98 U/L (14-59) 


 


 





 


Alkaline Phosphatase


 


 199 U/L


() 


 





 


Total Protein


 


 3.7 g/dL


(6.4-8.2) 


 





 


Albumin


 


 1.0 g/dL


(3.4-5.0) 


 





 


Albumin/Globulin Ratio  0.4 (1.0-1.7)   


 


Glucose (Fingerstick)


 


 


 179 mg/dL


(70-99) 143 mg/dL


(70-99)


 


Test


 7/3/20


23:56 7/4/20


06:12 7/4/20


06:15 





 


Glucose (Fingerstick)


 136 mg/dL


(70-99) 141 mg/dL


(70-99) 


 





 


White Blood Count


 


 


 25.9 x10^3/uL


(4.0-11.0) 





 


Red Blood Count


 


 


 2.52 x10^6/uL


(3.50-5.40) 





 


Hemoglobin


 


 


 7.4 g/dL


(12.0-15.5) 





 


Hematocrit


 


 


 22.3 %


(36.0-47.0) 





 


Mean Corpuscular Volume   88 fL ()  


 


Mean Corpuscular Hemoglobin   30 pg (25-35)  


 


Mean Corpuscular Hemoglobin


Concent 


 


 33 g/dL


(31-37) 





 


Red Cell Distribution Width


 


 


 15.1 %


(11.5-14.5) 





 


Platelet Count


 


 


 269 x10^3/uL


(140-400) 





 


Neutrophils (%) (Auto)   89 % (31-73)  


 


Lymphocytes (%) (Auto)   5 % (24-48)  


 


Monocytes (%) (Auto)   5 % (0-9)  


 


Eosinophils (%) (Auto)   1 % (0-3)  


 


Basophils (%) (Auto)   0 % (0-3)  


 


Neutrophils # (Auto)


 


 


 23.0 x10^3/uL


(1.8-7.7) 





 


Lymphocytes # (Auto)


 


 


 1.3 x10^3/uL


(1.0-4.8) 





 


Monocytes # (Auto)


 


 


 1.2 x10^3/uL


(0.0-1.1) 





 


Eosinophils # (Auto)


 


 


 0.2 x10^3/uL


(0.0-0.7) 





 


Basophils # (Auto)


 


 


 0.1 x10^3/uL


(0.0-0.2) 





 


Sodium Level


 


 


 132 mmol/L


(136-145) 





 


Potassium Level


 


 


 4.2 mmol/L


(3.5-5.1) 





 


Chloride Level


 


 


 101 mmol/L


() 





 


Carbon Dioxide Level


 


 


 29 mmol/L


(21-32) 





 


Anion Gap   2 (6-14)  


 


Blood Urea Nitrogen


 


 


 20 mg/dL


(7-20) 





 


Creatinine


 


 


 0.5 mg/dL


(0.6-1.0) 





 


Estimated GFR


(Cockcroft-Gault) 


 


 131.1 


 





 


BUN/Creatinine Ratio   40 (6-20)  


 


Glucose Level


 


 


 130 mg/dL


(70-99) 





 


Calcium Level


 


 


 8.6 mg/dL


(8.5-10.1) 





 


Total Bilirubin


 


 


 0.6 mg/dL


(0.2-1.0) 





 


Aspartate Amino Transf


(AST/SGOT) 


 


 28 U/L (15-37) 


 





 


Alanine Aminotransferase


(ALT/SGPT) 


 


 68 U/L (14-59) 


 





 


Alkaline Phosphatase


 


 


 210 U/L


() 





 


Total Protein


 


 


 3.2 g/dL


(6.4-8.2) 





 


Albumin


 


 


 0.9 g/dL


(3.4-5.0) 





 


Albumin/Globulin Ratio   0.4 (1.0-1.7)  








Laboratory Tests








Test


 7/3/20


10:16 7/3/20


12:26 7/3/20


18:06 7/3/20


23:56


 


Sodium Level


 133 mmol/L


(136-145) 


 


 





 


Potassium Level


 3.9 mmol/L


(3.5-5.1) 


 


 





 


Chloride Level


 102 mmol/L


() 


 


 





 


Carbon Dioxide Level


 28 mmol/L


(21-32) 


 


 





 


Anion Gap 3 (6-14)    


 


Blood Urea Nitrogen


 20 mg/dL


(7-20) 


 


 





 


Creatinine


 0.6 mg/dL


(0.6-1.0) 


 


 





 


Estimated GFR


(Cockcroft-Gault) 106.3 


 


 


 





 


BUN/Creatinine Ratio 33 (6-20)    


 


Glucose Level


 224 mg/dL


(70-99) 


 


 





 


Calcium Level


 8.6 mg/dL


(8.5-10.1) 


 


 





 


Total Bilirubin


 0.6 mg/dL


(0.2-1.0) 


 


 





 


Aspartate Amino Transf


(AST/SGOT) 42 U/L (15-37) 


 


 


 





 


Alanine Aminotransferase


(ALT/SGPT) 98 U/L (14-59) 


 


 


 





 


Alkaline Phosphatase


 199 U/L


() 


 


 





 


Total Protein


 3.7 g/dL


(6.4-8.2) 


 


 





 


Albumin


 1.0 g/dL


(3.4-5.0) 


 


 





 


Albumin/Globulin Ratio 0.4 (1.0-1.7)    


 


Glucose (Fingerstick)


 


 179 mg/dL


(70-99) 143 mg/dL


(70-99) 136 mg/dL


(70-99)


 


Test


 7/4/20


06:12 7/4/20


06:15 


 





 


Glucose (Fingerstick)


 141 mg/dL


(70-99) 


 


 





 


White Blood Count


 


 25.9 x10^3/uL


(4.0-11.0) 


 





 


Red Blood Count


 


 2.52 x10^6/uL


(3.50-5.40) 


 





 


Hemoglobin


 


 7.4 g/dL


(12.0-15.5) 


 





 


Hematocrit


 


 22.3 %


(36.0-47.0) 


 





 


Mean Corpuscular Volume  88 fL ()   


 


Mean Corpuscular Hemoglobin  30 pg (25-35)   


 


Mean Corpuscular Hemoglobin


Concent 


 33 g/dL


(31-37) 


 





 


Red Cell Distribution Width


 


 15.1 %


(11.5-14.5) 


 





 


Platelet Count


 


 269 x10^3/uL


(140-400) 


 





 


Neutrophils (%) (Auto)  89 % (31-73)   


 


Lymphocytes (%) (Auto)  5 % (24-48)   


 


Monocytes (%) (Auto)  5 % (0-9)   


 


Eosinophils (%) (Auto)  1 % (0-3)   


 


Basophils (%) (Auto)  0 % (0-3)   


 


Neutrophils # (Auto)


 


 23.0 x10^3/uL


(1.8-7.7) 


 





 


Lymphocytes # (Auto)


 


 1.3 x10^3/uL


(1.0-4.8) 


 





 


Monocytes # (Auto)


 


 1.2 x10^3/uL


(0.0-1.1) 


 





 


Eosinophils # (Auto)


 


 0.2 x10^3/uL


(0.0-0.7) 


 





 


Basophils # (Auto)


 


 0.1 x10^3/uL


(0.0-0.2) 


 





 


Sodium Level


 


 132 mmol/L


(136-145) 


 





 


Potassium Level


 


 4.2 mmol/L


(3.5-5.1) 


 





 


Chloride Level


 


 101 mmol/L


() 


 





 


Carbon Dioxide Level


 


 29 mmol/L


(21-32) 


 





 


Anion Gap  2 (6-14)   


 


Blood Urea Nitrogen


 


 20 mg/dL


(7-20) 


 





 


Creatinine


 


 0.5 mg/dL


(0.6-1.0) 


 





 


Estimated GFR


(Cockcroft-Gault) 


 131.1 


 


 





 


BUN/Creatinine Ratio  40 (6-20)   


 


Glucose Level


 


 130 mg/dL


(70-99) 


 





 


Calcium Level


 


 8.6 mg/dL


(8.5-10.1) 


 





 


Total Bilirubin


 


 0.6 mg/dL


(0.2-1.0) 


 





 


Aspartate Amino Transf


(AST/SGOT) 


 28 U/L (15-37) 


 


 





 


Alanine Aminotransferase


(ALT/SGPT) 


 68 U/L (14-59) 


 


 





 


Alkaline Phosphatase


 


 210 U/L


() 


 





 


Total Protein


 


 3.2 g/dL


(6.4-8.2) 


 





 


Albumin


 


 0.9 g/dL


(3.4-5.0) 


 





 


Albumin/Globulin Ratio  0.4 (1.0-1.7)   








Problem List


Problems


Medical Problems:


(1) Acute pancreatitis


Status: Acute  





(2) Cholelithiasis


Status: Acute  








Assessment/Plan


Status post pancreatic debridement awaiting return of bowel function prior to 

increase in tube feeds


Continue to wean to off of pressors as tolerated


Supportive care, may start Lovenox prophylaxis DVT





Justicifation of Admission Dx:


Justifications for Admission:


Justification of Admission Dx:  Yes











OVIDIO MEDINA MD              Jul 4, 2020 09:22

## 2020-07-04 NOTE — PDOC
PROGRESS NOTES


Chief Complaint


Chief Complaint


 








Impression and plan:


Acute hypoxic Respiratory failure required  mechanical ventilation


Tracheostomy


bilateral pleural effusions/pulm edema s/p Throacentesis on 6/15/2020


Severe Acute gallstone pancreatitis (not a surgical candidate at this time) with

necrosis


Acute kidney failure now requiring dialysis


Gallstones (Calculus of gallbladder with acute cholecystitis without 

obstruction)


HTN 


Intractable pain


Intractable nausea


Covid 19 negative. 


Acute on chronic anemia 


EEG: No seizure activityFever  - better currently - intermittent could be from 

underlying pancreatitis blood cults 5/4 - neg so far


? Ileus with vomiting


Abd distention - U/S and CT reviewed s/p 0.4 L of opaque, debris-containing 

ascites was removed 5/6


Acute pancreatitis with persistent necrosis


Gallstone pancreatitis with necrosis. 


   -CT A/P 6/6 showed multiple pseudocysts, slight larger on the right. s/p 

drains x 3, 6/7.  + PSAE (MDRO-R Cefepime, Zosyn ANSON < 64) and yeast, 


   -s/p drain 4/27. C. parapsilosis. s/p drain 5/6 + yeast & high amylase; s/p 

additional drain on 5/8. Drains removed. 


Ascites s/p paracentesis 4/15 & 5/6. C. parapsilosis 


JED. off HD. 


A large fluid collection in the pancreatic bed has slightly decreased in size, 

described below, the pancreas itself is difficult to  visualize, which could be 

due to necrosis or obscuration of pancreatic  parenchyma from the surrounding 

fluid collection.6/15 


- 4/27 status post ROBERT drain placement + C paropsilosis. s/p additional drains 

5/8


Anemia - S/p PRBCs


Cholelithiasis with thickening of the gallbladder wall.


Leucocytosis improving


JED, hyperkalemia, Metabolic acidosis off dialysis


hypocalcemia 


Prediabetes


HTN


s/p trach


ESRD on HD


Hyperglycemia


severe protein-caloric malnutrition


Moderate to large left pleural effusion with atelectasis and collapse  of most 

of the left lower lobe, stable


  


Poor prognosis





History of Present Illness


History of Present Illness


 s/p surg for  pancreatic inflammation, peripancreatic fluid and inflammatory 

changes around the pancreas consistent with pancreatitis. 


Gallstone pancreatitis with necrosis.  


  


Necrotizing pancreatitis 


She remains critically ill





Vitals


Vitals





Vital Signs








  Date Time  Temp Pulse Resp B/P (MAP) Pulse Ox O2 Delivery O2 Flow Rate FiO2


 


7/4/20 12:30  86  101/57 (72)    


 


7/4/20 12:19     99 Ventilator  


 


7/4/20 11:57 98.6  18     





 98.6       











Physical Exam


Physical Exam


GENERAL: Sedated, ill appearing


HEENT: Pupils equal, oral cavity dry. + NGT


NECK:  Tracheostomy 


LUNGS: Diminished aeration bases,  CT on left 


HEART:  S1, S2, regular w/ PVCs 


ABDOMEN: Distentied,  bowel sounds hypoactive, soft, gildardo x2, 3 ROBERT drains, 

G-J tube and + wound vac 


: [place] in place 


EXTREMITIES: Generalized edema, no cyanosis. SCDs & Podus boots bilaterally  


SKIN: warm touch. No signs of rash.  


LUE-PICC without signs of complications 


LUE art-line out, mottling left forearm, some better. RP palpable, cap refill 

brisk.


General:  Other (Sedated)


Heart:  Regular rate (SR/ST), Other (distant heart sounds)


Lungs:  Crackles


Abdomen:  Soft, No tenderness, Other (Wounds clean dry and intact, large Pleur-

evac drains with minimal output ROBERT drain with large serous output)


Extremities:  Other (Diffuse edema)


Skin:  No rashes, No significant lesion





Labs


LABS





Laboratory Tests








Test


 7/3/20


18:06 7/3/20


23:56 7/4/20


06:12 7/4/20


06:15


 


Glucose (Fingerstick)


 143 mg/dL


(70-99) 136 mg/dL


(70-99) 141 mg/dL


(70-99) 





 


White Blood Count


 


 


 


 25.9 x10^3/uL


(4.0-11.0)


 


Red Blood Count


 


 


 


 2.52 x10^6/uL


(3.50-5.40)


 


Hemoglobin


 


 


 


 7.4 g/dL


(12.0-15.5)


 


Hematocrit


 


 


 


 22.3 %


(36.0-47.0)


 


Mean Corpuscular Volume    88 fL () 


 


Mean Corpuscular Hemoglobin    30 pg (25-35) 


 


Mean Corpuscular Hemoglobin


Concent 


 


 


 33 g/dL


(31-37)


 


Red Cell Distribution Width


 


 


 


 15.1 %


(11.5-14.5)


 


Platelet Count


 


 


 


 269 x10^3/uL


(140-400)


 


Neutrophils (%) (Auto)    89 % (31-73) 


 


Lymphocytes (%) (Auto)    5 % (24-48) 


 


Monocytes (%) (Auto)    5 % (0-9) 


 


Eosinophils (%) (Auto)    1 % (0-3) 


 


Basophils (%) (Auto)    0 % (0-3) 


 


Neutrophils # (Auto)


 


 


 


 23.0 x10^3/uL


(1.8-7.7)


 


Lymphocytes # (Auto)


 


 


 


 1.3 x10^3/uL


(1.0-4.8)


 


Monocytes # (Auto)


 


 


 


 1.2 x10^3/uL


(0.0-1.1)


 


Eosinophils # (Auto)


 


 


 


 0.2 x10^3/uL


(0.0-0.7)


 


Basophils # (Auto)


 


 


 


 0.1 x10^3/uL


(0.0-0.2)


 


Sodium Level


 


 


 


 132 mmol/L


(136-145)


 


Potassium Level


 


 


 


 4.2 mmol/L


(3.5-5.1)


 


Chloride Level


 


 


 


 101 mmol/L


()


 


Carbon Dioxide Level


 


 


 


 29 mmol/L


(21-32)


 


Anion Gap    2 (6-14) 


 


Blood Urea Nitrogen


 


 


 


 20 mg/dL


(7-20)


 


Creatinine


 


 


 


 0.5 mg/dL


(0.6-1.0)


 


Estimated GFR


(Cockcroft-Gault) 


 


 


 131.1 





 


BUN/Creatinine Ratio    40 (6-20) 


 


Glucose Level


 


 


 


 130 mg/dL


(70-99)


 


Calcium Level


 


 


 


 8.6 mg/dL


(8.5-10.1)


 


Total Bilirubin


 


 


 


 0.6 mg/dL


(0.2-1.0)


 


Aspartate Amino Transf


(AST/SGOT) 


 


 


 28 U/L (15-37) 





 


Alanine Aminotransferase


(ALT/SGPT) 


 


 


 68 U/L (14-59) 





 


Alkaline Phosphatase


 


 


 


 210 U/L


()


 


Total Protein


 


 


 


 3.2 g/dL


(6.4-8.2)


 


Albumin


 


 


 


 0.9 g/dL


(3.4-5.0)


 


Albumin/Globulin Ratio    0.4 (1.0-1.7) 


 


Test


 7/4/20


11:50 


 


 





 


Glucose (Fingerstick)


 149 mg/dL


(70-99) 


 


 














Assessment and Plan


Assessmemt and Plan


Problems


Medical Problems:


(1) Acute pancreatitis


Status: Acute  





(2) Cholelithiasis


Status: Acute  











Comment


Review of Relevant


I have reviewed the following items josy (where applicable) has been applied.


Labs





Laboratory Tests








Test


 7/2/20


18:10 7/3/20


05:30 7/3/20


06:01 7/3/20


08:00


 


Glucose (Fingerstick)


 176 mg/dL


(70-99) 


 150 mg/dL


(70-99) 





 


White Blood Count


 


 29.7 x10^3/uL


(4.0-11.0) 


 





 


Red Blood Count


 


 2.85 x10^6/uL


(3.50-5.40) 


 





 


Hemoglobin


 


 8.3 g/dL


(12.0-15.5) 


 





 


Hematocrit


 


 25.0 %


(36.0-47.0) 


 





 


Mean Corpuscular Volume  88 fL ()   


 


Mean Corpuscular Hemoglobin  29 pg (25-35)   


 


Mean Corpuscular Hemoglobin


Concent 


 33 g/dL


(31-37) 


 





 


Red Cell Distribution Width


 


 15.0 %


(11.5-14.5) 


 





 


Platelet Count


 


 260 x10^3/uL


(140-400) 


 





 


Neutrophils (%) (Auto)  90 % (31-73)   


 


Lymphocytes (%) (Auto)  4 % (24-48)   


 


Monocytes (%) (Auto)  5 % (0-9)   


 


Eosinophils (%) (Auto)  1 % (0-3)   


 


Basophils (%) (Auto)  0 % (0-3)   


 


Neutrophils # (Auto)


 


 26.6 x10^3/uL


(1.8-7.7) 


 





 


Lymphocytes # (Auto)


 


 1.3 x10^3/uL


(1.0-4.8) 


 





 


Monocytes # (Auto)


 


 1.5 x10^3/uL


(0.0-1.1) 


 





 


Eosinophils # (Auto)


 


 0.3 x10^3/uL


(0.0-0.7) 


 





 


Basophils # (Auto)


 


 0.0 x10^3/uL


(0.0-0.2) 


 





 


Segmented Neutrophils %  87 % (35-66)   


 


Band Neutrophils %  7 % (0-9)   


 


Lymphocytes %  4 % (24-48)   


 


Monocytes %  2 % (0-10)   


 


Platelet Estimate


 


 Adequate


(ADEQUATE) 


 





 


Polychromasia  Present   


 


Anisocytosis  Present   


 


Creatine Kinase


 


 17 U/L


() 


 





 


O2 Saturation    85 % (92-99) 


 


Arterial Blood pH


 


 


 


 7.36


(7.35-7.45)


 


Arterial Blood pCO2 at


Patient Temp 


 


 


 48 mmHg


(35-46)


 


Arterial Blood pO2 at Patient


Temp 


 


 


 54 mmHg


()


 


Arterial Blood HCO3


 


 


 


 27 mmol/L


(21-28)


 


Arterial Blood Base Excess


 


 


 


 1 mmol/L


(-3-3)


 


FiO2    40 


 


Test


 7/3/20


10:16 7/3/20


12:26 7/3/20


18:06 7/3/20


23:56


 


Sodium Level


 133 mmol/L


(136-145) 


 


 





 


Potassium Level


 3.9 mmol/L


(3.5-5.1) 


 


 





 


Chloride Level


 102 mmol/L


() 


 


 





 


Carbon Dioxide Level


 28 mmol/L


(21-32) 


 


 





 


Anion Gap 3 (6-14)    


 


Blood Urea Nitrogen


 20 mg/dL


(7-20) 


 


 





 


Creatinine


 0.6 mg/dL


(0.6-1.0) 


 


 





 


Estimated GFR


(Cockcroft-Gault) 106.3 


 


 


 





 


BUN/Creatinine Ratio 33 (6-20)    


 


Glucose Level


 224 mg/dL


(70-99) 


 


 





 


Calcium Level


 8.6 mg/dL


(8.5-10.1) 


 


 





 


Total Bilirubin


 0.6 mg/dL


(0.2-1.0) 


 


 





 


Aspartate Amino Transf


(AST/SGOT) 42 U/L (15-37) 


 


 


 





 


Alanine Aminotransferase


(ALT/SGPT) 98 U/L (14-59) 


 


 


 





 


Alkaline Phosphatase


 199 U/L


() 


 


 





 


Total Protein


 3.7 g/dL


(6.4-8.2) 


 


 





 


Albumin


 1.0 g/dL


(3.4-5.0) 


 


 





 


Albumin/Globulin Ratio 0.4 (1.0-1.7)    


 


Glucose (Fingerstick)


 


 179 mg/dL


(70-99) 143 mg/dL


(70-99) 136 mg/dL


(70-99)


 


Test


 7/4/20


06:12 7/4/20


06:15 7/4/20


11:50 





 


Glucose (Fingerstick)


 141 mg/dL


(70-99) 


 149 mg/dL


(70-99) 





 


White Blood Count


 


 25.9 x10^3/uL


(4.0-11.0) 


 





 


Red Blood Count


 


 2.52 x10^6/uL


(3.50-5.40) 


 





 


Hemoglobin


 


 7.4 g/dL


(12.0-15.5) 


 





 


Hematocrit


 


 22.3 %


(36.0-47.0) 


 





 


Mean Corpuscular Volume  88 fL ()   


 


Mean Corpuscular Hemoglobin  30 pg (25-35)   


 


Mean Corpuscular Hemoglobin


Concent 


 33 g/dL


(31-37) 


 





 


Red Cell Distribution Width


 


 15.1 %


(11.5-14.5) 


 





 


Platelet Count


 


 269 x10^3/uL


(140-400) 


 





 


Neutrophils (%) (Auto)  89 % (31-73)   


 


Lymphocytes (%) (Auto)  5 % (24-48)   


 


Monocytes (%) (Auto)  5 % (0-9)   


 


Eosinophils (%) (Auto)  1 % (0-3)   


 


Basophils (%) (Auto)  0 % (0-3)   


 


Neutrophils # (Auto)


 


 23.0 x10^3/uL


(1.8-7.7) 


 





 


Lymphocytes # (Auto)


 


 1.3 x10^3/uL


(1.0-4.8) 


 





 


Monocytes # (Auto)


 


 1.2 x10^3/uL


(0.0-1.1) 


 





 


Eosinophils # (Auto)


 


 0.2 x10^3/uL


(0.0-0.7) 


 





 


Basophils # (Auto)


 


 0.1 x10^3/uL


(0.0-0.2) 


 





 


Sodium Level


 


 132 mmol/L


(136-145) 


 





 


Potassium Level


 


 4.2 mmol/L


(3.5-5.1) 


 





 


Chloride Level


 


 101 mmol/L


() 


 





 


Carbon Dioxide Level


 


 29 mmol/L


(21-32) 


 





 


Anion Gap  2 (6-14)   


 


Blood Urea Nitrogen


 


 20 mg/dL


(7-20) 


 





 


Creatinine


 


 0.5 mg/dL


(0.6-1.0) 


 





 


Estimated GFR


(Cockcroft-Gault) 


 131.1 


 


 





 


BUN/Creatinine Ratio  40 (6-20)   


 


Glucose Level


 


 130 mg/dL


(70-99) 


 





 


Calcium Level


 


 8.6 mg/dL


(8.5-10.1) 


 





 


Total Bilirubin


 


 0.6 mg/dL


(0.2-1.0) 


 





 


Aspartate Amino Transf


(AST/SGOT) 


 28 U/L (15-37) 


 


 





 


Alanine Aminotransferase


(ALT/SGPT) 


 68 U/L (14-59) 


 


 





 


Alkaline Phosphatase


 


 210 U/L


() 


 





 


Total Protein


 


 3.2 g/dL


(6.4-8.2) 


 





 


Albumin


 


 0.9 g/dL


(3.4-5.0) 


 





 


Albumin/Globulin Ratio  0.4 (1.0-1.7)   








Laboratory Tests








Test


 7/3/20


18:06 7/3/20


23:56 7/4/20


06:12 7/4/20


06:15


 


Glucose (Fingerstick)


 143 mg/dL


(70-99) 136 mg/dL


(70-99) 141 mg/dL


(70-99) 





 


White Blood Count


 


 


 


 25.9 x10^3/uL


(4.0-11.0)


 


Red Blood Count


 


 


 


 2.52 x10^6/uL


(3.50-5.40)


 


Hemoglobin


 


 


 


 7.4 g/dL


(12.0-15.5)


 


Hematocrit


 


 


 


 22.3 %


(36.0-47.0)


 


Mean Corpuscular Volume    88 fL () 


 


Mean Corpuscular Hemoglobin    30 pg (25-35) 


 


Mean Corpuscular Hemoglobin


Concent 


 


 


 33 g/dL


(31-37)


 


Red Cell Distribution Width


 


 


 


 15.1 %


(11.5-14.5)


 


Platelet Count


 


 


 


 269 x10^3/uL


(140-400)


 


Neutrophils (%) (Auto)    89 % (31-73) 


 


Lymphocytes (%) (Auto)    5 % (24-48) 


 


Monocytes (%) (Auto)    5 % (0-9) 


 


Eosinophils (%) (Auto)    1 % (0-3) 


 


Basophils (%) (Auto)    0 % (0-3) 


 


Neutrophils # (Auto)


 


 


 


 23.0 x10^3/uL


(1.8-7.7)


 


Lymphocytes # (Auto)


 


 


 


 1.3 x10^3/uL


(1.0-4.8)


 


Monocytes # (Auto)


 


 


 


 1.2 x10^3/uL


(0.0-1.1)


 


Eosinophils # (Auto)


 


 


 


 0.2 x10^3/uL


(0.0-0.7)


 


Basophils # (Auto)


 


 


 


 0.1 x10^3/uL


(0.0-0.2)


 


Sodium Level


 


 


 


 132 mmol/L


(136-145)


 


Potassium Level


 


 


 


 4.2 mmol/L


(3.5-5.1)


 


Chloride Level


 


 


 


 101 mmol/L


()


 


Carbon Dioxide Level


 


 


 


 29 mmol/L


(21-32)


 


Anion Gap    2 (6-14) 


 


Blood Urea Nitrogen


 


 


 


 20 mg/dL


(7-20)


 


Creatinine


 


 


 


 0.5 mg/dL


(0.6-1.0)


 


Estimated GFR


(Cockcroft-Gault) 


 


 


 131.1 





 


BUN/Creatinine Ratio    40 (6-20) 


 


Glucose Level


 


 


 


 130 mg/dL


(70-99)


 


Calcium Level


 


 


 


 8.6 mg/dL


(8.5-10.1)


 


Total Bilirubin


 


 


 


 0.6 mg/dL


(0.2-1.0)


 


Aspartate Amino Transf


(AST/SGOT) 


 


 


 28 U/L (15-37) 





 


Alanine Aminotransferase


(ALT/SGPT) 


 


 


 68 U/L (14-59) 





 


Alkaline Phosphatase


 


 


 


 210 U/L


()


 


Total Protein


 


 


 


 3.2 g/dL


(6.4-8.2)


 


Albumin


 


 


 


 0.9 g/dL


(3.4-5.0)


 


Albumin/Globulin Ratio    0.4 (1.0-1.7) 


 


Test


 7/4/20


11:50 


 


 





 


Glucose (Fingerstick)


 149 mg/dL


(70-99) 


 


 











Microbiology


6/30/20 Gram Stain - Final, Resulted


          


6/30/20 Aerobic and Anaerobic Culture - Preliminary, Resulted


          


6/28/20 Blood Culture - Final, Complete


          NO GROWTH AFTER 5 DAYS


6/15/20 Gram Stain - Final, Complete


          


6/15/20 Aerobic and Anaerobic Culture - Final, Complete


          


6/13/20 Gram Stain Evaluation - Final, Complete


          


6/13/20 Respiratory Culture - Final, Complete


          


6/13/20 Antimicrobic Susceptibility - Final, Complete


          


6/7/20 Urine Culture - Final, Complete


         


5/30/20 Gram Stain - Final, Complete


          


5/30/20 Aerobic Culture - Final, Complete


Medications





Current Medications


Sodium Chloride 1,000 ml @  1,000 mls/hr Q1H IV  Last administered on 3/16/20at 

03:00;  Start 3/16/20 at 03:00;  Stop 3/16/20 at 03:59;  Status DC


Ondansetron HCl (Zofran) 4 mg 1X  ONCE IVP  Last administered on 3/16/20at 

03:27;  Start 3/16/20 at 03:00;  Stop 3/16/20 at 03:01;  Status DC


Morphine Sulfate (Morphine Sulfate) 4 mg 1X  ONCE IV ;  Start 3/16/20 at 03:00; 

Stop 3/16/20 at 03:01;  Status Cancel


Ketorolac Tromethamine (Toradol 30mg Vial) 30 mg 1X  ONCE IV  Last administered 

on 3/16/20at 02:54;  Start 3/16/20 at 03:00;  Stop 3/16/20 at 03:01;  Status DC


Fentanyl Citrate (Fentanyl 2ml Vial) 25 mcg 1X  ONCE IVP  Last administered on 

3/16/20at 03:23;  Start 3/16/20 at 03:30;  Stop 3/16/20 at 03:31;  Status DC


Fentanyl Citrate (Fentanyl 2ml Vial) 100 mcg STK-MED ONCE .ROUTE ;  Start 

3/16/20 at 03:18;  Stop 3/16/20 at 03:18;  Status DC


Iohexol (Omnipaque 350 Mg/ml) 90 ml 1X  ONCE IV  Last administered on 3/16/20at 

03:25;  Start 3/16/20 at 03:30;  Stop 3/16/20 at 03:31;  Status DC


Info (CONTRAST GIVEN -- Rx MONITORING) 1 each PRN DAILY  PRN MC SEE COMMENTS;  

Start 3/16/20 at 03:30;  Stop 3/18/20 at 03:29;  Status DC


Hydromorphone HCl (Dilaudid) 0.5 mg 1X  ONCE IV  Last administered on 3/16/20at 

03:55;  Start 3/16/20 at 04:30;  Stop 3/16/20 at 04:32;  Status DC


Ondansetron HCl (Zofran) 4 mg PRN Q8HRS  PRN IV NAUSEA/VOMITING 1ST CHOICE;  

Start 3/16/20 at 05:00;  Stop 3/16/20 at 09:27;  Status DC


Morphine Sulfate (Morphine Sulfate) 2 mg PRN Q2HR  PRN IV SEVERE PAIN 7-10 Last 

administered on 3/17/20at 12:26;  Start 3/16/20 at 05:00;  Stop 3/17/20 at 

14:15;  Status DC


Sodium Chloride 1,000 ml @  125 mls/hr Q8H IV  Last administered on 3/16/20at 

20:56;  Start 3/16/20 at 05:00;  Stop 3/17/20 at 04:59;  Status DC


Hydromorphone HCl (Dilaudid) 0.5 mg PRN Q3HRS  PRN IV SEVERE PAIN 7-10 Last 

administered on 3/17/20at 10:06;  Start 3/16/20 at 05:00;  Stop 3/17/20 at 

12:01;  Status DC


Piperacillin Sod/ Tazobactam Sod 4.5 gm/Sodium Chloride 100 ml @  200 mls/hr 1X 

ONCE IV  Last administered on 3/16/20at 05:44;  Start 3/16/20 at 06:00;  Stop 

3/16/20 at 06:29;  Status DC


Ondansetron HCl (Zofran) 4 mg PRN Q4HRS  PRN IV NAUSEA/VOMITING 1ST CHOICE Last 

administered on 6/27/20at 13:37;  Start 3/16/20 at 09:30


Insulin Human Lispro (HumaLOG) 0-9 UNITS Q6HRS SQ  Last administered on 7/3/20at

12:30;  Start 3/16/20 at 09:30


Dextrose (Dextrose 50%-Water Syringe) 12.5 gm PRN Q15MIN  PRN IV SEE COMMENTS;  

Start 3/16/20 at 09:30


Pantoprazole Sodium (PROTONIX VIAL for IV PUSH) 40 mg DAILYAC IVP  Last 

administered on 7/4/20at 07:50;  Start 3/16/20 at 11:30


Prochlorperazine Edisylate (Compazine) 10 mg PRN Q6HRS  PRN IV NAUSEA/VOMITING, 

2nd CHOICE Last administered on 6/27/20at 10:53;  Start 3/16/20 at 17:45


Atenolol (Tenormin) 100 mg DAILY PO ;  Start 3/17/20 at 09:00;  Stop 3/16/20 at 

20:08;  Status DC


Metoprolol Tartrate (Lopressor Vial) 2.5 mg Q6HRS IVP  Last administered on 

3/17/20at 05:51;  Start 3/16/20 at 20:15;  Stop 3/17/20 at 10:02;  Status DC


Metoprolol Tartrate (Lopressor Vial) 5 mg Q6HRS IVP  Last administered on 

3/26/20at 00:12;  Start 3/17/20 at 10:15;  Stop 3/28/20 at 08:48;  Status DC


Hydromorphone HCl (Dilaudid) 1 mg PRN Q3HRS  PRN IV SEVERE PAIN 7-10 Last 

administered on 3/23/20at 05:13;  Start 3/17/20 at 12:00;  Stop 3/31/20 at 

00:25;  Status DC


Lidocaine HCl (Buffered Lidocaine 1%) 3 ml STK-MED ONCE .ROUTE ;  Start 3/17/20 

at 12:55;  Stop 3/17/20 at 12:56;  Status DC


Albumin Human 500 ml @  125 mls/hr 1X  ONCE IV  Last administered on 3/17/20at 

14:33;  Start 3/17/20 at 14:30;  Stop 3/17/20 at 18:32;  Status DC


Norepinephrine Bitartrate 8 mg/ Dextrose 258 ml @  17.299 mls/ hr CONT  PRN IV 

PER PROTOCOL Last administered on 4/14/20at 12:48;  Start 3/17/20 at 15:30;  

Stop 4/17/20 at 09:19;  Status DC


Sodium Chloride 1,000 ml @  125 mls/hr Q8H IV  Last administered on 3/17/20at 

21:04;  Start 3/17/20 at 16:00;  Stop 3/18/20 at 02:42;  Status DC


Albumin Human 500 ml @  125 mls/hr PRN BID  PRN IV After every 2L NSS & BP < 

90mm Last administered on 6/30/20at 16:06;  Start 3/17/20 at 16:00;  Stop 7/3/20

at 09:30;  Status DC


Iohexol (Omnipaque 300 Mg/ml) 60 ml 1X  ONCE IV  Last administered on 3/17/20at 

17:20;  Start 3/17/20 at 17:00;  Stop 3/17/20 at 17:01;  Status DC


Info (CONTRAST GIVEN -- Rx MONITORING) 1 each PRN DAILY  PRN MC SEE COMMENTS;  

Start 3/17/20 at 17:00;  Stop 3/19/20 at 16:59;  Status DC


Meropenem 1 gm/ Sodium Chloride 100 ml @  200 mls/hr Q8HRS IV  Last administered

on 3/18/20at 05:45;  Start 3/17/20 at 20:00;  Stop 3/18/20 at 08:48;  Status DC


Furosemide (Lasix) 40 mg 1X  ONCE IVP  Last administered on 3/17/20at 22:12;  

Start 3/17/20 at 22:30;  Stop 3/17/20 at 22:31;  Status DC


Calcium Chloride 1000 mg/Sodium Chloride 110 ml @  220 mls/hr 1X  ONCE IV  Last 

administered on 3/17/20at 22:11;  Start 3/17/20 at 22:30;  Stop 3/17/20 at 

22:59;  Status DC


Albuterol Sulfate (Ventolin Neb Soln) 2.5 mg 1X  ONCE NEB  Last administered on 

3/18/20at 00:56;  Start 3/17/20 at 22:30;  Stop 3/17/20 at 22:31;  Status DC


Insulin Human Regular (HumuLIN R VIAL) 5 unit 1X  ONCE IV  Last administered on 

3/17/20at 22:14;  Start 3/17/20 at 22:30;  Stop 3/17/20 at 22:31;  Status DC


Magnesium Sulfate 50 ml @ 25 mls/hr 1X  ONCE IV  Last administered on 3/18/20at 

02:57;  Start 3/18/20 at 03:00;  Stop 3/18/20 at 04:59;  Status DC


Calcium Gluconate 1000 mg/Sodium Chloride 110 ml @  220 mls/hr 1X  ONCE IV  Last

administered on 3/18/20at 02:46;  Start 3/18/20 at 03:00;  Stop 3/18/20 at 

03:29;  Status DC


Sodium Chloride 1,000 ml @  200 mls/hr Q5H IV  Last administered on 3/18/20at 

02:46;  Start 3/18/20 at 03:00;  Stop 3/18/20 at 10:21;  Status DC


Calcium Gluconate 1000 mg/Sodium Chloride 110 ml @  220 mls/hr 1X  ONCE IV  Last

administered on 3/18/20at 03:21;  Start 3/18/20 at 03:30;  Stop 3/18/20 at 

03:59;  Status DC


Sodium Bicarbonate 50 meq/Sodium Chloride 1,050 ml @  75 mls/hr Q14H IV  Last 

administered on 3/22/20at 21:10;  Start 3/18/20 at 07:30;  Stop 3/23/20 at 

10:28;  Status DC


Calcium Gluconate 2000 mg/Sodium Chloride 120 ml @  220 mls/hr 1X  ONCE IV  Last

administered on 3/18/20at 09:05;  Start 3/18/20 at 07:30;  Stop 3/18/20 at 

08:02;  Status DC


Lidocaine HCl (Xylocaine-Mpf 1% 2ml Vial) 2 ml STK-MED ONCE .ROUTE ;  Start 

3/18/20 at 08:47;  Stop 3/18/20 at 08:47;  Status DC


Meropenem 500 mg/ Sodium Chloride 50 ml @  100 mls/hr Q12HR IV  Last 

administered on 3/23/20at 21:01;  Start 3/18/20 at 18:00;  Stop 3/24/20 at 

07:58;  Status DC


Lidocaine HCl (Buffered Lidocaine 1%) 3 ml STK-MED ONCE .ROUTE ;  Start 3/18/20 

at 09:46;  Stop 3/18/20 at 09:46;  Status DC


Lidocaine HCl (Buffered Lidocaine 1%) 6 ml 1X  ONCE INJ  Last administered on 

3/18/20at 10:26;  Start 3/18/20 at 10:15;  Stop 3/18/20 at 10:16;  Status DC


Info (Tpn Per Pharmacy) 1 each PRN DAILY  PRN MC SEE COMMENTS Last administered 

on 7/4/20at 10:33;  Start 3/18/20 at 12:00


Sodium Chloride 1,000 ml @  1,000 mls/hr Q1H PRN IV hypotension;  Start 3/18/20 

at 12:07;  Stop 3/18/20 at 18:06;  Status DC


Diphenhydramine HCl (Benadryl) 25 mg 1X PRN  PRN IV ITCHING;  Start 3/18/20 at 

12:15;  Stop 3/19/20 at 12:14;  Status DC


Diphenhydramine HCl (Benadryl) 25 mg 1X PRN  PRN IV ITCHING;  Start 3/18/20 at 

12:15;  Stop 3/19/20 at 12:14;  Status DC


Sodium Chloride 1,000 ml @  400 mls/hr Q2H30M PRN IV PATENCY;  Start 3/18/20 at 

12:07;  Stop 3/19/20 at 00:06;  Status DC


Info (PHARMACY MONITORING -- do not chart) 1 each PRN DAILY  PRN MC SEE 

COMMENTS;  Start 3/18/20 at 12:15;  Stop 3/20/20 at 08:13;  Status DC


Sodium Chloride 90 meq/Calcium Gluconate 10 meq/ Multivitamins 10 ml/Chromium/ 

Copper/Manganese/ Seleni/Zn 1 ml/ Total Parenteral Nutrition/Amino 

Acids/Dextrose/ Fat Emulsion Intravenous 55.005 ml  @ 2.292 mls/hr TPN  CONT IV 

;  Start 3/18/20 at 22:00;  Stop 3/18/20 at 12:33;  Status DC


Info (Tpn Per Pharmacy) 1 each PRN DAILY  PRN MC SEE COMMENTS;  Start 3/18/20 at

12:30;  Status UNV


Sodium Chloride 90 meq/Calcium Gluconate 10 meq/ Multivitamins 10 ml/Chromium/ 

Copper/Manganese/ Seleni/Zn 0.5 ml/ Total Parenteral Nutrition/Amino 

Acids/Dextrose/ Fat Emulsion Intravenous 1,512 ml @  63 mls/hr TPN  CONT IV  

Last administered on 3/18/20at 22:06;  Start 3/18/20 at 22:00;  Stop 3/19/20 at 

21:59;  Status DC


Calcium Carbonate/ Glycine (Tums) 500 mg PRN AFTMEALHC  PRN PO INDIGESTION;  

Start 3/18/20 at 17:45;  Stop 5/13/20 at 10:25;  Status DC


Calcium Gluconate (Calcium Gluconate) 2,000 mg 1X  ONCE IVP  Last administered 

on 3/19/20at 02:19;  Start 3/19/20 at 02:15;  Stop 3/19/20 at 02:16;  Status DC


Calcium Chloride 3000 mg/Sodium Chloride 1,030 ml @  50 mls/hr [id number] IV  Last 

administered on 3/21/20at 02:17;  Start 3/19/20 at 08:00;  Stop 3/21/20 at 

15:23;  Status DC


Lorazepam (Ativan Inj) 1 mg PRN Q4HRS  PRN IVP ANXIETY / AGITATION, 2nd choic 

Last administered on 4/17/20at 03:51;  Start 3/19/20 at 09:00;  Stop 4/17/20 at 

09:19;  Status DC


Sodium Chloride 1,000 ml @  1,000 mls/hr Q1H PRN IV hypotension;  Start 3/19/20 

at 08:56;  Stop 3/19/20 at 14:55;  Status DC


Albumin Human 200 ml @  200 mls/hr 1X PRN  PRN IV Hypotension;  Start 3/19/20 at

09:00;  Stop 3/19/20 at 14:59;  Status DC


Diphenhydramine HCl (Benadryl) 25 mg 1X PRN  PRN IV ITCHING;  Start 3/19/20 at 

09:00;  Stop 3/20/20 at 08:59;  Status DC


Diphenhydramine HCl (Benadryl) 25 mg 1X PRN  PRN IV ITCHING;  Start 3/19/20 at 

09:00;  Stop 3/20/20 at 08:59;  Status DC


Sodium Chloride 1,000 ml @  400 mls/hr Q2H30M PRN IV PATENCY;  Start 3/19/20 at 

08:56;  Stop 3/19/20 at 20:55;  Status DC


Info (PHARMACY MONITORING -- do not chart) 1 each PRN DAILY  PRN MC SEE 

COMMENTS;  Start 3/19/20 at 09:00;  Status UNV


Info (PHARMACY MONITORING -- do not chart) 1 each PRN DAILY  PRN MC SEE 

COMMENTS;  Start 3/19/20 at 09:00;  Stop 3/20/20 at 08:13;  Status DC


Digoxin (Lanoxin) 500 mcg 1X  ONCE IV  Last administered on 3/19/20at 10:04;  

Start 3/19/20 at 10:00;  Stop 3/19/20 at 10:01;  Status DC


Digoxin (Lanoxin) 125 mcg 1X  ONCE IV  Last administered on 3/19/20at 17:10;  

Start 3/19/20 at 18:00;  Stop 3/19/20 at 18:01;  Status DC


Magnesium Sulfate 100 ml @  25 mls/hr 1X  ONCE IV  Last administered on 

3/19/20at 12:48;  Start 3/19/20 at 13:00;  Stop 3/19/20 at 16:59;  Status DC


Sodium Chloride 90 meq/Magnesium Sulfate 10 meq/ Calcium Gluconate 20 meq/ 

Multivitamins 10 ml/Chromium/ Copper/Manganese/ Seleni/Zn 0.5 ml/ Total 

Parenteral Nutrition/Amino Acids/Dextrose/ Fat Emulsion Intravenous 1,512 ml @  

63 mls/hr TPN  CONT IV  Last administered on 3/19/20at 22:25;  Start 3/19/20 at 

22:00;  Stop 3/20/20 at 21:59;  Status DC


Sodium Chloride 1,000 ml @  1,000 mls/hr Q1H PRN IV hypotension;  Start 3/20/20 

at 08:05;  Stop 3/20/20 at 14:04;  Status DC


Albumin Human 200 ml @  200 mls/hr 1X  ONCE IV  Last administered on 3/20/20at 

08:57;  Start 3/20/20 at 08:15;  Stop 3/20/20 at 09:14;  Status DC


Diphenhydramine HCl (Benadryl) 25 mg 1X PRN  PRN IV ITCHING;  Start 3/20/20 at 

08:15;  Stop 3/21/20 at 08:14;  Status DC


Diphenhydramine HCl (Benadryl) 25 mg 1X PRN  PRN IV ITCHING;  Start 3/20/20 at 

08:15;  Stop 3/21/20 at 08:14;  Status DC


Sodium Chloride 1,000 ml @  400 mls/hr Q2H30M PRN IV PATENCY;  Start 3/20/20 at 

08:05;  Stop 3/20/20 at 20:04;  Status DC


Info (PHARMACY MONITORING -- do not chart) 1 each PRN DAILY  PRN MC SEE 

COMMENTS;  Start 3/20/20 at 08:15;  Stop 3/24/20 at 07:57;  Status DC


Sodium Chloride 90 meq/Potassium Chloride 15 meq/ Potassium Phosphate 10 mmol/ 

Magnesium Sulfate 10 meq/Calcium Gluconate 20 meq/ Multivitamins 10 ml/Chromium/

Copper/Manganese/ Seleni/Zn 0.5 ml/ Total Parenteral Nutrition/Amino 

Acids/Dextrose/ Fat Emulsion Intravenous 1,512 ml @  63 mls/hr TPN  CONT IV  

Last administered on 3/20/20at 21:01;  Start 3/20/20 at 22:00;  Stop 3/21/20 at 

21:59;  Status DC


Potassium Chloride/Water 100 ml @  100 mls/hr 1X  ONCE IV  Last administered on 

3/20/20at 14:09;  Start 3/20/20 at 14:00;  Stop 3/20/20 at 14:59;  Status DC


Benzocaine (Hurricaine One) 1 spray 1X  ONCE MM  Last administered on 3/20/20at 

16:38;  Start 3/20/20 at 14:30;  Stop 3/20/20 at 14:31;  Status DC


Lidocaine HCl (Glydo (Lidocaine) Jelly) 1 thomas 1X  ONCE MM  Last administered on 

3/20/20at 16:38;  Start 3/20/20 at 14:30;  Stop 3/20/20 at 14:31;  Status DC


Linezolid/Dextrose 300 ml @  300 mls/hr Q12HR IV  Last administered on 3/26/20at

21:04;  Start 3/20/20 at 20:00;  Stop 3/27/20 at 07:50;  Status DC


Acetaminophen (Tylenol) 650 mg PRN Q6HRS  PRN PO MILD PAIN / TEMP;  Start 

3/21/20 at 03:30;  Stop 3/21/20 at 03:36;  Status DC


Acetaminophen (Tylenol) 650 mg PRN Q6HRS  PRN PEG MILD PAIN / TEMP Last 

administered on 4/16/20at 19:56;  Start 3/21/20 at 03:36;  Stop 5/13/20 at 

10:25;  Status DC


Sodium Chloride 1,000 ml @  1,000 mls/hr Q1H PRN IV hypotension;  Start 3/21/20 

at 07:50;  Stop 3/21/20 at 13:49;  Status DC


Albumin Human 200 ml @  200 mls/hr 1X PRN  PRN IV Hypotension;  Start 3/21/20 at

08:00;  Stop 3/21/20 at 13:59;  Status DC


Sodium Chloride (Normal Saline Flush) 10 ml 1X PRN  PRN IV AP catheter pack;  

Start 3/21/20 at 08:00;  Stop 3/22/20 at 07:59;  Status DC


Sodium Chloride (Normal Saline Flush) 10 ml 1X PRN  PRN IV  catheter pack;  

Start 3/21/20 at 08:00;  Stop 3/22/20 at 07:59;  Status DC


Sodium Chloride 1,000 ml @  400 mls/hr Q2H30M PRN IV PATENCY;  Start 3/21/20 at 

07:50;  Stop 3/21/20 at 19:49;  Status DC


Info (PHARMACY MONITORING -- do not chart) 1 each PRN DAILY  PRN MC SEE 

COMMENTS;  Start 3/21/20 at 08:00;  Status UNV


Info (PHARMACY MONITORING -- do not chart) 1 each PRN DAILY  PRN MC SEE 

COMMENTS;  Start 3/21/20 at 08:00;  Stop 3/23/20 at 08:25;  Status DC


Sodium Chloride 90 meq/Potassium Chloride 15 meq/ Potassium Phosphate 10 mmol/ 

Magnesium Sulfate 10 meq/Calcium Gluconate 20 meq/ Multivitamins 10 ml/Chromium/

Copper/Manganese/ Seleni/Zn 0.5 ml/ Total Parenteral Nutrition/Amino 

Acids/Dextrose/ Fat Emulsion Intravenous 1,512 ml @  63 mls/hr TPN  CONT IV  

Last administered on 3/21/20at 20:57;  Start 3/21/20 at 22:00;  Stop 3/22/20 at 

21:59;  Status DC


Sodium Chloride 90 meq/Potassium Chloride 15 meq/ Potassium Phosphate 15 mmol/ 

Magnesium Sulfate 10 meq/Calcium Gluconate 20 meq/ Multivitamins 10 ml/Chromium/

Copper/Manganese/ Seleni/Zn 0.5 ml/ Total Parenteral Nutrition/Amino 

Acids/Dextrose/ Fat Emulsion Intravenous 1,512 ml @  63 mls/hr TPN  CONT IV ;  

Start 3/22/20 at 22:00;  Stop 3/22/20 at 14:16;  Status DC


Sodium Chloride 90 meq/Potassium Chloride 15 meq/ Potassium Phosphate 15 mmol/ 

Magnesium Sulfate 10 meq/Calcium Gluconate 20 meq/ Multivitamins 10 ml/Chromium/

Copper/Manganese/ Seleni/Zn 0.5 ml/ Total Parenteral Nutrition/Amino 

Acids/Dextrose/ Fat Emulsion Intravenous 1,200 ml @  50 mls/hr TPN  CONT IV ;  

Start 3/22/20 at 22:00;  Stop 3/22/20 at 14:17;  Status DC


Sodium Chloride 90 meq/Potassium Chloride 15 meq/ Potassium Phosphate 10 mmol/ 

Magnesium Sulfate 10 meq/Calcium Gluconate 20 meq/ Multivitamins 10 ml/Chromium/

Copper/Manganese/ Seleni/Zn 0.5 ml/ Total Parenteral Nutrition/Amino 

Acids/Dextrose/ Fat Emulsion Intravenous 1,200 ml @  50 mls/hr TPN  CONT IV  

Last administered on 3/22/20at 23:29;  Start 3/22/20 at 22:00;  Stop 3/23/20 at 

21:59;  Status DC


Sodium Chloride 1,000 ml @  1,000 mls/hr Q1H PRN IV hypotension;  Start 3/23/20 

at 07:28;  Stop 3/23/20 at 13:27;  Status DC


Albumin Human 200 ml @  200 mls/hr 1X  ONCE IV  Last administered on 3/23/20at 

08:51;  Start 3/23/20 at 07:30;  Stop 3/23/20 at 08:29;  Status DC


Diphenhydramine HCl (Benadryl) 25 mg 1X PRN  PRN IV ITCHING;  Start 3/23/20 at 

07:30;  Stop 3/24/20 at 07:29;  Status DC


Diphenhydramine HCl (Benadryl) 25 mg 1X PRN  PRN IV ITCHING;  Start 3/23/20 at 

07:30;  Stop 3/24/20 at 07:29;  Status DC


Sodium Chloride 1,000 ml @  400 mls/hr Q2H30M PRN IV PATENCY;  Start 3/23/20 at 

07:28;  Stop 3/23/20 at 19:27;  Status DC


Info (PHARMACY MONITORING -- do not chart) 1 each PRN DAILY  PRN MC SEE 

COMMENTS;  Start 3/23/20 at 07:30;  Stop 4/3/20 at 13:01;  Status DC


Metronidazole 100 ml @  100 mls/hr Q6HRS IV  Last administered on 4/8/20at 

06:26;  Start 3/23/20 at 08:30;  Stop 4/8/20 at 09:58;  Status DC


Micafungin Sodium 100 mg/Dextrose 100 ml @  100 mls/hr Q24H IV  Last 

administered on 4/30/20at 08:18;  Start 3/23/20 at 09:00;  Stop 4/30/20 at 

20:58;  Status DC


Propofol 0 ml @ As Directed STK-MED ONCE IV ;  Start 3/23/20 at 07:53;  Stop 

3/23/20 at 07:53;  Status DC


Etomidate (Amidate) 20 mg STK-MED ONCE IV ;  Start 3/23/20 at 07:53;  Stop 

3/23/20 at 07:54;  Status DC


Midazolam HCl (Versed) 5 mg STK-MED ONCE .ROUTE ;  Start 3/23/20 at 07:57;  Stop

3/23/20 at 07:57;  Status DC


Fentanyl Citrate 30 ml @ 0 mls/hr CONT  PRN IV SEE PROTOCOL Last administered on

4/17/20at 06:12;  Start 3/23/20 at 08:15;  Stop 4/17/20 at 09:19;  Status DC


Artificial Tears (Artificial Tears) 1 drop PRN Q1HR  PRN OU DRY EYE, 1st choice;

 Start 3/23/20 at 08:15;  Stop 4/29/20 at 05:31;  Status DC


Midazolam HCl 50 mg/Sodium Chloride 50 ml @ 0 mls/hr CONT  PRN IV SEE PROTOCOL 

Last administered on 3/26/20at 22:39;  Start 3/23/20 at 08:15;  Stop 3/28/20 at 

15:59;  Status DC


Etomidate (Amidate) 8 mg 1X  ONCE IV  Last administered on 3/23/20at 08:33;  

Start 3/23/20 at 08:30;  Stop 3/23/20 at 08:31;  Status DC


Succinylcholine Chloride (Anectine) 120 mg 1X  ONCE IV  Last administered on 

3/23/20at 08:34;  Start 3/23/20 at 08:30;  Stop 3/23/20 at 08:31;  Status DC


Midazolam HCl (Versed) 5 mg 1X  ONCE IV ;  Start 3/23/20 at 08:30;  Stop 3/23/20

at 08:31;  Status DC


Potassium Chloride 15 meq/ Bicarbonate Dialysis Soln w/ out KCl 5,007.5 ml  @ 

1,000 mls/ hr Q5H1M IV  Last administered on 3/24/20at 11:11;  Start 3/23/20 at 

12:00;  Stop 3/24/20 at 11:15;  Status DC


Potassium Chloride 15 meq/ Bicarbonate Dialysis Soln w/ out KCl 5,007.5 ml  @ 

1,000 mls/ hr Q5H1M IV  Last administered on 3/24/20at 11:12;  Start 3/23/20 at 

12:00;  Stop 3/24/20 at 11:17;  Status DC


Potassium Chloride 15 meq/ Bicarbonate Dialysis Soln w/ out KCl 5,007.5 ml  @ 

1,000 mls/ hr Q5H1M IV  Last administered on 3/24/20at 11:11;  Start 3/23/20 at 

12:00;  Stop 3/24/20 at 11:19;  Status DC


Sodium Chloride 90 meq/Potassium Chloride 15 meq/ Potassium Phosphate 10 mmol/ 

Magnesium Sulfate 10 meq/Calcium Gluconate 20 meq/ Multivitamins 10 ml/Chromium/

Copper/Manganese/ Seleni/Zn 0.5 ml/ Total Parenteral Nutrition/Amino 

Acids/Dextrose/ Fat Emulsion Intravenous 1,400 ml @  58.333 mls/ hr TPN  CONT IV

 Last administered on 3/23/20at 21:42;  Start 3/23/20 at 22:00;  Stop 3/24/20 at

21:59;  Status DC


Heparin Sodium (Porcine) (Heparin Sodium) 5,000 unit Q8HRS SQ  Last administered

on 3/28/20at 05:55;  Start 3/23/20 at 15:00;  Stop 3/28/20 at 13:28;  Status DC


Meropenem 500 mg/ Sodium Chloride 50 ml @  100 mls/hr Q6HRS IV  Last 

administered on 3/25/20at 06:00;  Start 3/24/20 at 09:00;  Stop 3/25/20 at 

07:29;  Status DC


Potassium Phosphate 20 mmol/ Sodium Chloride 106.6667 ml @  51.667 m... 1X  ONCE

IV  Last administered on 3/24/20at 11:22;  Start 3/24/20 at 10:15;  Stop 3/24/20

at 12:18;  Status DC


Acetaminophen (Tylenol Supp) 650 mg PRN Q6HRS  PRN HI MILD PAIN / TEMP > 100.3'F

Last administered on 6/29/20at 18:16;  Start 3/24/20 at 10:30


Potassium Chloride/Water 100 ml @  100 mls/hr Q1H IV  Last administered on 

3/24/20at 12:12;  Start 3/24/20 at 11:00;  Stop 3/24/20 at 12:59;  Status DC


Potassium Chloride 20 meq/ Bicarbonate Dialysis Soln w/ out KCl 5,010 ml @  

1,000 mls/hr Q5H1M IV  Last administered on 3/25/20at 08:48;  Start 3/24/20 at 

12:00;  Stop 3/25/20 at 13:03;  Status DC


Potassium Chloride 20 meq/ Bicarbonate Dialysis Soln w/ out KCl 5,010 ml @  

1,000 mls/hr Q5H1M IV  Last administered on 3/29/20at 14:52;  Start 3/24/20 at 

11:30;  Stop 3/29/20 at 19:59;  Status DC


Potassium Chloride 20 meq/ Bicarbonate Dialysis Soln w/ out KCl 5,010 ml @  

1,000 mls/hr Q5H1M IV  Last administered on 3/29/20at 14:53;  Start 3/24/20 at 

11:30;  Stop 3/29/20 at 19:59;  Status DC


Sodium Chloride 90 meq/Potassium Chloride 15 meq/ Potassium Phosphate 15 mmol/ 

Magnesium Sulfate 10 meq/Calcium Gluconate 15 meq/ Multivitamins 10 ml/Chromium/

Copper/Manganese/ Seleni/Zn 0.5 ml/ Total Parenteral Nutrition/Amino 

Acids/Dextrose/ Fat Emulsion Intravenous 1,400 ml @  58.333 mls/ hr TPN  CONT IV

 Last administered on 3/24/20at 22:17;  Start 3/24/20 at 22:00;  Stop 3/25/20 at

21:59;  Status DC


Cefepime HCl (Maxipime) 2 gm Q12HR IVP  Last administered on 4/7/20at 20:56;  

Start 3/25/20 at 09:00;  Stop 4/8/20 at 09:58;  Status DC


Daptomycin 500 mg/ Sodium Chloride 50 ml @  100 mls/hr Q48H IV  Last 

administered on 4/10/20at 09:57;  Start 3/25/20 at 08:30;  Stop 4/10/20 at 

10:07;  Status DC


Lidocaine HCl (Buffered Lidocaine 1%) 3 ml 1X  ONCE INJ  Last administered on 

3/25/20at 10:27;  Start 3/25/20 at 10:30;  Stop 3/25/20 at 10:31;  Status DC


Potassium Phosphate 20 mmol/ Sodium Chloride 106.6667 ml @  51.667 m... 1X  ONCE

IV  Last administered on 3/25/20at 12:51;  Start 3/25/20 at 13:00;  Stop 3/25/20

at 15:03;  Status DC


Sodium Chloride 90 meq/Potassium Chloride 15 meq/ Potassium Phosphate 18 mmol/ 

Magnesium Sulfate 8 meq/Calcium Gluconate 15 meq/ Multivitamins 10 ml/Chromium/ 

Copper/Manganese/ Seleni/Zn 0.5 ml/ Total Parenteral Nutrition/Amino 

Acids/Dextrose/ Fat Emulsion Intravenous 1,400 ml @  58.333 mls/ hr TPN  CONT IV

 Last administered on 3/25/20at 22:16;  Start 3/25/20 at 22:00;  Stop 3/26/20 at

21:59;  Status DC


Potassium Chloride 20 meq/ Bicarbonate Dialysis Soln w/ out KCl 5,010 ml @  

1,000 mls/hr Q5H1M IV  Last administered on 3/29/20at 14:54;  Start 3/25/20 at 

16:00;  Stop 3/29/20 at 19:59;  Status DC


Multi-Ingred Cream/Lotion/Oil/ Oint (Artificial Tears Eye Ointment) 1 thomas PRN 

Q1HR  PRN OU DRY EYE, 2nd choice Last administered on 4/13/20at 08:19;  Start 

3/25/20 at 17:30;  Stop 6/3/20 at 14:39;  Status DC


Sodium Chloride 90 meq/Potassium Chloride 15 meq/ Potassium Phosphate 18 mmol/ 

Magnesium Sulfate 8 meq/Calcium Gluconate 15 meq/ Multivitamins 10 ml/Chromium/ 

Copper/Manganese/ Seleni/Zn 0.5 ml/ Total Parenteral Nutrition/Amino 

Acids/Dextrose/ Fat Emulsion Intravenous 1,400 ml @  58.333 mls/ hr TPN  CONT IV

 Last administered on 3/26/20at 22:00;  Start 3/26/20 at 22:00;  Stop 3/27/20 at

21:59;  Status DC


Albumin Human 500 ml @  125 mls/hr 1X  ONCE IV ;  Start 3/26/20 at 14:15;  Stop 

3/26/20 at 18:14;  Status DC


Sodium Chloride 90 meq/Potassium Chloride 15 meq/ Potassium Phosphate 18 mmol/ 

Magnesium Sulfate 8 meq/Calcium Gluconate 15 meq/ Multivitamins 10 ml/Chromium/ 

Copper/Manganese/ Seleni/Zn 0.5 ml/ Insulin Human Regular 10 unit/ Total 

Parenteral Nutrition/Amino Acids/Dextrose/ Fat Emulsion Intravenous 1,400 ml @  

58.333 mls/ hr TPN  CONT IV  Last administered on 3/27/20at 21:43;  Start 

3/27/20 at 22:00;  Stop 3/28/20 at 21:59;  Status DC


Lidocaine HCl (Buffered Lidocaine 1%) 3 ml STK-MED ONCE .ROUTE ;  Start 3/25/20 

at 10:00;  Stop 3/27/20 at 13:57;  Status DC


Midazolam HCl 100 mg/Sodium Chloride 100 ml @ 7 mls/hr CONT  PRN IV SEE PROTOCOL

Last administered on 4/8/20at 15:35;  Start 3/28/20 at 16:00;  Stop 6/3/20 at 

14:38;  Status DC


Sodium Chloride 90 meq/Potassium Chloride 15 meq/ Potassium Phosphate 18 mmol/ 

Magnesium Sulfate 8 meq/Calcium Gluconate 15 meq/ Multivitamins 10 ml/Chromium/ 

Copper/Manganese/ Seleni/Zn 0.5 ml/ Insulin Human Regular 15 unit/ Total 

Parenteral Nutrition/Amino Acids/Dextrose/ Fat Emulsion Intravenous 1,400 ml @  

58.333 mls/ hr TPN  CONT IV  Last administered on 3/28/20at 20:34;  Start 

3/28/20 at 22:00;  Stop 3/29/20 at 21:59;  Status DC


Info ([facility] Electrolyte Protocol) 1 ea CONT PRN  PRN MC PER PROTOCOL;  Start 

3/29/20 at 13:15


Sodium Chloride 90 meq/Potassium Chloride 15 meq/ Potassium Phosphate 18 mmol/ 

Magnesium Sulfate 8 meq/Calcium Gluconate 15 meq/ Multivitamins 10 ml/Chromium/ 

Copper/Manganese/ Seleni/Zn 0.5 ml/ Insulin Human Regular 15 unit/ Total 

Parenteral Nutrition/Amino Acids/Dextrose/ Fat Emulsion Intravenous 1,400 ml @  

58.333 mls/ hr TPN  CONT IV  Last administered on 3/29/20at 22:05;  Start 

3/29/20 at 22:00;  Stop 3/30/20 at 21:59;  Status DC


Potassium Chloride 15 meq/ Bicarbonate Dialysis Soln w/ out KCl 5,007.5 ml  @ 

1,000 mls/ hr Q5H1M IV  Last administered on 4/1/20at 18:14;  Start 3/29/20 at 

20:00;  Stop 4/2/20 at 13:08;  Status DC


Potassium Chloride 15 meq/ Bicarbonate Dialysis Soln w/ out KCl 5,007.5 ml  @ 

1,000 mls/ hr Q5H1M IV  Last administered on 4/1/20at 18:14;  Start 3/29/20 at 

20:00;  Stop 4/2/20 at 13:08;  Status DC


Potassium Chloride 15 meq/ Bicarbonate Dialysis Soln w/ out KCl 5,007.5 ml  @ 1,

000 mls/ hr Q5H1M IV  Last administered on 4/1/20at 18:14;  Start 3/29/20 at 

20:00;  Stop 4/2/20 at 13:08;  Status DC


Iohexol (Omnipaque 240 Mg/ml) 30 ml 1X  ONCE PO  Last administered on 3/30/20at 

11:30;  Start 3/30/20 at 11:30;  Stop 3/30/20 at 11:33;  Status DC


Info (CONTRAST GIVEN -- Rx MONITORING) 1 each PRN DAILY  PRN MC SEE COMMENTS;  

Start 3/30/20 at 11:45;  Stop 4/1/20 at 11:44;  Status DC


Sodium Chloride 90 meq/Potassium Chloride 15 meq/ Potassium Phosphate 18 mmol/ 

Magnesium Sulfate 8 meq/Calcium Gluconate 15 meq/ Multivitamins 10 ml/Chromium/ 

Copper/Manganese/ Seleni/Zn 0.5 ml/ Insulin Human Regular 15 unit/ Total Parent

eral Nutrition/Amino Acids/Dextrose/ Fat Emulsion Intravenous 1,400 ml @  58.333

mls/ hr TPN  CONT IV  Last administered on 3/30/20at 21:47;  Start 3/30/20 at 

22:00;  Stop 3/31/20 at 21:59;  Status DC


Sodium Chloride 90 meq/Potassium Chloride 15 meq/ Potassium Phosphate 18 mmol/ 

Magnesium Sulfate 8 meq/Calcium Gluconate 15 meq/ Multivitamins 10 ml/Chromium/ 

Copper/Manganese/ Seleni/Zn 0.5 ml/ Insulin Human Regular 20 unit/ Total 

Parenteral Nutrition/Amino Acids/Dextrose/ Fat Emulsion Intravenous 1,400 ml @  

58.333 mls/ hr TPN  CONT IV  Last administered on 3/31/20at 21:36;  Start 

3/31/20 at 22:00;  Stop 4/1/20 at 21:59;  Status DC


Alteplase, Recombinant (Cathflo For Central Catheter Clearance) 1 mg 1X  ONCE 

INT CAT  Last administered on 3/31/20at 20:03;  Start 3/31/20 at 19:30;  Stop 

3/31/20 at 19:46;  Status DC


Alteplase, Recombinant (Cathflo For Central Catheter Clearance) 1 mg 1X  ONCE 

INT CAT  Last administered on 3/31/20at 22:05;  Start 3/31/20 at 22:00;  Stop 

3/31/20 at 22:01;  Status DC


Sodium Chloride 90 meq/Potassium Chloride 15 meq/ Potassium Phosphate 18 mmol/ 

Magnesium Sulfate 8 meq/Calcium Gluconate 15 meq/ Multivitamins 10 ml/Chromium/ 

Copper/Manganese/ Seleni/Zn 0.5 ml/ Insulin Human Regular 20 unit/ Total 

Parenteral Nutrition/Amino Acids/Dextrose/ Fat Emulsion Intravenous 1,400 ml @  

58.333 mls/ hr TPN  CONT IV  Last administered on 4/1/20at 21:30;  Start 4/1/20 

at 22:00;  Stop 4/2/20 at 21:59;  Status DC


Dexmedetomidine HCl 400 mcg/ Sodium Chloride 100 ml @ 0 mls/hr CONT  PRN IV 

ANXIETY / AGITATION Last administered on 5/30/20at 12:57;  Start 4/2/20 at 

08:15;  Stop 5/30/20 at 18:31;  Status DC


Sodium Chloride 500 ml @  500 mls/hr 1X PRN  PRN IV ELEVATED BP, SEE COMMENTS;  

Start 4/2/20 at 08:15


Atropine Sulfate (ATROPINE 0.5mg SYRINGE) 0.5 mg PRN Q5MIN  PRN IV SEE COMMENTS;

 Start 4/2/20 at 08:15


Furosemide (Lasix) 20 mg 1X  ONCE IVP  Last administered on 4/2/20at 08:19;  

Start 4/2/20 at 08:15;  Stop 4/2/20 at 08:16;  Status DC


Lidocaine HCl (Buffered Lidocaine 1%) 3 ml STK-MED ONCE .ROUTE ;  Start 4/2/20 

at 08:39;  Stop 4/2/20 at 08:39;  Status DC


Lidocaine HCl (Buffered Lidocaine 1%) 6 ml 1X  ONCE INJ  Last administered on 

4/2/20at 09:05;  Start 4/2/20 at 09:00;  Stop 4/2/20 at 09:06;  Status DC


Sodium Chloride 90 meq/Potassium Chloride 15 meq/ Potassium Phosphate 18 mmol/ 

Magnesium Sulfate 8 meq/Calcium Gluconate 15 meq/ Multivitamins 10 ml/Chromium/ 

Copper/Manganese/ Seleni/Zn 0.5 ml/ Insulin Human Regular 20 unit/ Total 

Parenteral Nutrition/Amino Acids/Dextrose/ Fat Emulsion Intravenous 1,400 ml @  

58.333 mls/ hr TPN  CONT IV  Last administered on 4/2/20at 22:45;  Start 4/2/20 

at 22:00;  Stop 4/3/20 at 21:59;  Status DC


Sodium Chloride 1,000 ml @  1,000 mls/hr Q1H PRN IV hypotension;  Start 4/3/20 

at 07:30;  Stop 4/3/20 at 13:29;  Status DC


Albumin Human 200 ml @  200 mls/hr 1X PRN  PRN IV Hypotension Last administered 

on 4/3/20at 09:36;  Start 4/3/20 at 07:30;  Stop 4/3/20 at 13:29;  Status DC


Sodium Chloride (Normal Saline Flush) 10 ml 1X PRN  PRN IV AP catheter pack;  

Start 4/3/20 at 07:30;  Stop 4/3/20 at 21:29;  Status DC


Sodium Chloride (Normal Saline Flush) 10 ml 1X PRN  PRN IV  catheter pack;  

Start 4/3/20 at 07:30;  Stop 4/4/20 at 07:29;  Status DC


Sodium Chloride 1,000 ml @  400 mls/hr Q2H30M PRN IV PATENCY;  Start 4/3/20 at 

07:30;  Stop 4/3/20 at 19:29;  Status DC


Info (PHARMACY MONITORING -- do not chart) 1 each PRN DAILY  PRN MC SEE 

COMMENTS;  Start 4/3/20 at 07:30;  Stop 4/3/20 at 13:02;  Status DC


Info (PHARMACY MONITORING -- do not chart) 1 each PRN DAILY  PRN MC SEE 

COMMENTS;  Start 4/3/20 at 07:30;  Stop 4/5/20 at 12:45;  Status DC


Sodium Chloride 90 meq/Potassium Chloride 15 meq/ Potassium Phosphate 10 mmol/ 

Magnesium Sulfate 8 meq/Calcium Gluconate 15 meq/ Multivitamins 10 ml/Chromium/ 

Copper/Manganese/ Seleni/Zn 0.5 ml/ Insulin Human Regular 25 unit/ Total 

Parenteral Nutrition/Amino Acids/Dextrose/ Fat Emulsion Intravenous 1,400 ml @  

58.333 mls/ hr TPN  CONT IV  Last administered on 4/3/20at 22:19;  Start 4/3/20 

at 22:00;  Stop 4/4/20 at 21:59;  Status DC


Heparin Sodium (Porcine) (Heparin Sodium) 5,000 unit Q12HR SQ  Last administered

on 4/26/20at 08:59;  Start 4/3/20 at 21:00;  Stop 4/26/20 at 10:05;  Status DC


Ondansetron HCl (Zofran) 4 mg PRN Q6HRS  PRN IV NAUSEA/VOMITING;  Start 4/6/20 

at 07:00;  Stop 4/7/20 at 06:59;  Status DC


Fentanyl Citrate (Fentanyl 2ml Vial) 25 mcg PRN Q5MIN  PRN IV MILD PAIN 1-3;  

Start 4/6/20 at 07:00;  Stop 4/7/20 at 06:59;  Status DC


Fentanyl Citrate (Fentanyl 2ml Vial) 50 mcg PRN Q5MIN  PRN IV MODERATE TO SEVERE

PAIN;  Start 4/6/20 at 07:00;  Stop 4/7/20 at 06:59;  Status DC


Ringer's Solution 1,000 ml @  30 mls/hr Q24H IV ;  Start 4/6/20 at 07:00;  Stop 

4/6/20 at 18:59;  Status DC


Lidocaine HCl (Xylocaine-Mpf 1% 2ml Vial) 2 ml PRN 1X  PRN ID PRIOR TO IV START;

 Start 4/6/20 at 07:00;  Stop 4/7/20 at 06:59;  Status DC


Prochlorperazine Edisylate (Compazine) 5 mg PACU PRN  PRN IV NAUSEA, MRX1;  

Start 4/6/20 at 07:00;  Stop 4/7/20 at 06:59;  Status DC


Sodium Chloride 1,000 ml @  1,000 mls/hr Q1H PRN IV hypotension;  Start 4/4/20 

at 09:10;  Stop 4/4/20 at 15:09;  Status DC


Albumin Human 200 ml @  200 mls/hr 1X PRN  PRN IV Hypotension Last administered 

on 4/4/20at 10:10;  Start 4/4/20 at 09:15;  Stop 4/4/20 at 15:14;  Status DC


Sodium Chloride 1,000 ml @  400 mls/hr Q2H30M PRN IV PATENCY;  Start 4/4/20 at 

09:10;  Stop 4/4/20 at 21:09;  Status DC


Info (PHARMACY MONITORING -- do not chart) 1 each PRN DAILY  PRN MC SEE 

COMMENTS;  Start 4/4/20 at 09:15;  Stop 4/5/20 at 12:45;  Status DC


Info (PHARMACY MONITORING -- do not chart) 1 each PRN DAILY  PRN MC SEE CO

MMENTS;  Start 4/4/20 at 09:15;  Stop 4/5/20 at 12:45;  Status DC


Sodium Chloride 90 meq/Potassium Chloride 15 meq/ Potassium Phosphate 10 mmol/ 

Magnesium Sulfate 8 meq/Calcium Gluconate 15 meq/ Multivitamins 10 ml/Chromium/ 

Copper/Manganese/ Seleni/Zn 0.5 ml/ Insulin Human Regular 25 unit/ Total 

Parenteral Nutrition/Amino Acids/Dextrose/ Fat Emulsion Intravenous 1,400 ml @  

58.333 mls/ hr TPN  CONT IV  Last administered on 4/4/20at 22:10;  Start 4/4/20 

at 22:00;  Stop 4/5/20 at 21:59;  Status DC


Magnesium Sulfate 50 ml @ 25 mls/hr PRN DAILY  PRN IV for Mag < 1.7 on am labs 

Last administered on 6/18/20at 10:57;  Start 4/5/20 at 09:15


Sodium Chloride 90 meq/Potassium Chloride 15 meq/ Potassium Phosphate 10 mmol/ 

Magnesium Sulfate 8 meq/Calcium Gluconate 15 meq/ Multivitamins 10 ml/Chromium/ 

Copper/Manganese/ Seleni/Zn 0.5 ml/ Insulin Human Regular 25 unit/ Total 

Parenteral Nutrition/Amino Acids/Dextrose/ Fat Emulsion Intravenous 1,400 ml @  

58.333 mls/ hr TPN  CONT IV  Last administered on 4/5/20at 21:20;  Start 4/5/20 

at 22:00;  Stop 4/6/20 at 21:59;  Status DC


Sodium Chloride 1,000 ml @  1,000 mls/hr Q1H PRN IV hypotension;  Start 4/5/20 

at 12:23;  Stop 4/5/20 at 18:22;  Status DC


Albumin Human 200 ml @  200 mls/hr 1X  ONCE IV  Last administered on 4/5/20at 1

3:34;  Start 4/5/20 at 12:30;  Stop 4/5/20 at 13:29;  Status DC


Diphenhydramine HCl (Benadryl) 25 mg 1X PRN  PRN IV ITCHING;  Start 4/5/20 at 

12:30;  Stop 4/6/20 at 12:29;  Status DC


Diphenhydramine HCl (Benadryl) 25 mg 1X PRN  PRN IV ITCHING;  Start 4/5/20 at 

12:30;  Stop 4/6/20 at 12:29;  Status DC


Info (PHARMACY MONITORING -- do not chart) 1 each PRN DAILY  PRN MC SEE 

COMMENTS;  Start 4/5/20 at 12:30;  Status Cancel


Bupivacaine HCl/ Epinephrine Bitart (Sensorcain-Epi 0.5%-1:581558 Mpf) 30 ml 

STK-MED ONCE .ROUTE  Last administered on 4/6/20at 11:44;  Start 4/6/20 at 

11:00;  Stop 4/6/20 at 11:01;  Status DC


Cellulose (Surgicel Fibrillar 1x2) 1 each STK-MED ONCE .ROUTE ;  Start 4/6/20 at

11:00;  Stop 4/6/20 at 11:01;  Status DC


Sodium Chloride 90 meq/Potassium Chloride 15 meq/ Potassium Phosphate 10 mmol/ 

Magnesium Sulfate 12 meq/Calcium Gluconate 15 meq/ Multivitamins 10 ml/Chromium/

Copper/Manganese/ Seleni/Zn 0.5 ml/ Insulin Human Regular 25 unit/ Total 

Parenteral Nutrition/Amino Acids/Dextrose/ Fat Emulsion Intravenous 1,400 ml @  

58.333 mls/ hr TPN  CONT IV  Last administered on 4/6/20at 22:24;  Start 4/6/20 

at 22:00;  Stop 4/7/20 at 21:59;  Status DC


Propofol 20 ml @ As Directed STK-MED ONCE IV ;  Start 4/6/20 at 11:07;  Stop 

4/6/20 at 11:07;  Status DC


Cellulose (Surgicel Hemostat 4x8) 1 each STK-MED ONCE .ROUTE  Last administered 

on 4/6/20at 11:44;  Start 4/6/20 at 11:55;  Stop 4/6/20 at 11:56;  Status DC


Sevoflurane (Ultane) 60 ml STK-MED ONCE IH ;  Start 4/6/20 at 12:46;  Stop 

4/6/20 at 12:46;  Status DC


Sodium Chloride 1,000 ml @  1,000 mls/hr Q1H PRN IV hypotension;  Start 4/6/20 

at 13:51;  Stop 4/6/20 at 19:50;  Status DC


Albumin Human 200 ml @  200 mls/hr 1X PRN  PRN IV Hypotension Last administered 

on 4/6/20at 14:51;  Start 4/6/20 at 14:00;  Stop 4/6/20 at 19:59;  Status DC


Diphenhydramine HCl (Benadryl) 25 mg 1X PRN  PRN IV ITCHING;  Start 4/6/20 at 

14:00;  Stop 4/7/20 at 13:59;  Status DC


Diphenhydramine HCl (Benadryl) 25 mg 1X PRN  PRN IV ITCHING;  Start 4/6/20 at 

14:00;  Stop 4/7/20 at 13:59;  Status DC


Sodium Chloride 1,000 ml @  400 mls/hr Q2H30M PRN IV PATENCY;  Start 4/6/20 at 

13:51;  Stop 4/7/20 at 01:50;  Status DC


Info (PHARMACY MONITORING -- do not chart) 1 each PRN DAILY  PRN MC SEE 

COMMENTS;  Start 4/6/20 at 14:00;  Stop 4/9/20 at 08:16;  Status DC


Heparin Sodium (Porcine) (Hep Lock Adult) 500 unit STK-MED ONCE IVP ;  Start 

4/7/20 at 09:29;  Stop 4/7/20 at 09:30;  Status DC


Sodium Chloride 1,000 ml @  1,000 mls/hr Q1H PRN IV hypotension;  Start 4/7/20 

at 10:43;  Stop 4/7/20 at 16:42;  Status DC


Sodium Chloride 1,000 ml @  400 mls/hr Q2H30M PRN IV PATENCY;  Start 4/7/20 at 

10:43;  Stop 4/7/20 at 22:42;  Status DC


Info (PHARMACY MONITORING -- do not chart) 1 each PRN DAILY  PRN MC SEE 

COMMENTS;  Start 4/7/20 at 10:45;  Status UNV


Info (PHARMACY MONITORING -- do not chart) 1 each PRN DAILY  PRN MC SEE 

COMMENTS;  Start 4/7/20 at 10:45;  Status UNV


Sodium Chloride 90 meq/Potassium Chloride 15 meq/ Magnesium Sulfate 12 

meq/Calcium Gluconate 15 meq/ Multivitamins 10 ml/Chromium/ Copper/Manganese/ 

Seleni/Zn 0.5 ml/ Insulin Human Regular 25 unit/ Total Parenteral 

Nutrition/Amino Acids/Dextrose/ Fat Emulsion Intravenous 1,400 ml @  58.333 mls/

hr TPN  CONT IV  Last administered on 4/7/20at 22:13;  Start 4/7/20 at 22:00;  

Stop 4/8/20 at 21:59;  Status DC


Sodium Chloride 1,000 ml @  1,000 mls/hr Q1H PRN IV hypotension;  Start 4/8/20 

at 07:50;  Stop 4/8/20 at 13:49;  Status DC


Albumin Human 200 ml @  200 mls/hr 1X  ONCE IV ;  Start 4/8/20 at 08:00;  Stop 

4/8/20 at 08:53;  Status DC


Diphenhydramine HCl (Benadryl) 25 mg 1X PRN  PRN IV ITCHING;  Start 4/8/20 at 

08:00;  Stop 4/9/20 at 07:59;  Status DC


Diphenhydramine HCl (Benadryl) 25 mg 1X PRN  PRN IV ITCHING;  Start 4/8/20 at 

08:00;  Stop 4/9/20 at 07:59;  Status DC


Info (PHARMACY MONITORING -- do not chart) 1 each PRN DAILY  PRN MC SEE 

COMMENTS;  Start 4/8/20 at 08:00;  Stop 4/9/20 at 08:16;  Status DC


Albumin Human 50 ml @ 50 mls/hr 1X  ONCE IV ;  Start 4/8/20 at 08:53;  Stop 

4/8/20 at 08:56;  Status DC


Albumin Human 200 ml @  50 mls/hr PRN 1X  PRN IV HYPOTENSION Last administered 

on 4/14/20at 11:54;  Start 4/8/20 at 09:00;  Stop 5/21/20 at 11:14;  Status DC


Meropenem 500 mg/ Sodium Chloride 50 ml @  100 mls/hr Q12H IV  Last administered

on 4/28/20at 10:45;  Start 4/8/20 at 10:00;  Stop 4/28/20 at 12:37;  Status DC


Sodium Chloride 90 meq/Magnesium Sulfate 12 meq/ Calcium Gluconate 15 meq/ 

Multivitamins 10 ml/Chromium/ Copper/Manganese/ Seleni/Zn 0.5 ml/ Insulin Human 

Regular 25 unit/ Total Parenteral Nutrition/Amino Acids/Dextrose/ Fat Emulsion 

Intravenous 1,400 ml @  58.333 mls/ hr TPN  CONT IV  Last administered on 

4/8/20at 21:41;  Start 4/8/20 at 22:00;  Stop 4/9/20 at 21:59;  Status DC


Sodium Chloride 1,000 ml @  1,000 mls/hr Q1H PRN IV hypotension;  Start 4/9/20 

at 07:58;  Stop 4/9/20 at 13:57;  Status DC


Albumin Human 200 ml @  200 mls/hr 1X PRN  PRN IV Hypotension Last administered 

on 4/9/20at 09:30;  Start 4/9/20 at 08:00;  Stop 4/9/20 at 13:59;  Status DC


Sodium Chloride 1,000 ml @  400 mls/hr Q2H30M PRN IV PATENCY;  Start 4/9/20 at 

07:58;  Stop 4/9/20 at 19:57;  Status DC


Info (PHARMACY MONITORING -- do not chart) 1 each PRN DAILY  PRN MC SEE 

COMMENTS;  Start 4/9/20 at 08:00;  Status Cancel


Info (PHARMACY MONITORING -- do not chart) 1 each PRN DAILY  PRN MC SEE 

COMMENTS;  Start 4/9/20 at 08:15;  Status UNV


Sodium Chloride 90 meq/Potassium Phosphate 5 mmol/ Magnesium Sulfate 12 

meq/Calcium Gluconate 15 meq/ Multivitamins 10 ml/Chromium/ Copper/Manganese/ 

Seleni/Zn 0.5 ml/ Insulin Human Regular 30 unit/ Total Parenteral 

Nutrition/Amino Acids/Dextrose/ Fat Emulsion Intravenous 1,400 ml @  58.333 mls/

hr TPN  CONT IV  Last administered on 4/9/20at 22:08;  Start 4/9/20 at 22:00;  

Stop 4/10/20 at 21:59;  Status DC


Linezolid/Dextrose 300 ml @  300 mls/hr Q12HR IV  Last administered on 4/20/20at

20:40;  Start 4/10/20 at 11:00;  Stop 4/21/20 at 08:10;  Status DC


Sodium Chloride 90 meq/Potassium Phosphate 15 mmol/ Magnesium Sulfate 12 m

eq/Calcium Gluconate 15 meq/ Multivitamins 10 ml/Chromium/ Copper/Manganese/ 

Seleni/Zn 0.5 ml/ Insulin Human Regular 30 unit/ Total Parenteral 

Nutrition/Amino Acids/Dextrose/ Fat Emulsion Intravenous 1,400 ml @  58.333 mls/

hr TPN  CONT IV  Last administered on 4/10/20at 21:49;  Start 4/10/20 at 22:00; 

Stop 4/11/20 at 21:59;  Status DC


Sodium Chloride 90 meq/Potassium Phosphate 15 mmol/ Magnesium Sulfate 12 

meq/Calcium Gluconate 15 meq/ Multivitamins 10 ml/Chromium/ Copper/Manganese/ 

Seleni/Zn 0.5 ml/ Insulin Human Regular 40 unit/ Total Parenteral 

Nutrition/Amino Acids/Dextrose/ Fat Emulsion Intravenous 1,400 ml @  58.333 mls/

hr TPN  CONT IV  Last administered on 4/11/20at 21:21;  Start 4/11/20 at 22:00; 

Stop 4/12/20 at 21:59;  Status DC


Sodium Chloride 1,000 ml @  1,000 mls/hr Q1H PRN IV hypotension;  Start 4/11/20 

at 13:26;  Stop 4/11/20 at 19:25;  Status DC


Albumin Human 200 ml @  200 mls/hr 1X PRN  PRN IV Hypotension Last administered 

on 4/11/20at 15:00;  Start 4/11/20 at 13:30;  Stop 4/11/20 at 19:29;  Status DC


Sodium Chloride (Normal Saline Flush) 10 ml 1X PRN  PRN IV AP catheter pack;  

Start 4/11/20 at 13:30;  Stop 4/12/20 at 13:29;  Status DC


Sodium Chloride (Normal Saline Flush) 10 ml 1X PRN  PRN IV  catheter pack;  

Start 4/11/20 at 13:30;  Stop 4/12/20 at 13:29;  Status DC


Sodium Chloride 1,000 ml @  400 mls/hr Q2H30M PRN IV PATENCY;  Start 4/11/20 at 

13:26;  Stop 4/12/20 at 01:25;  Status DC


Info (PHARMACY MONITORING -- do not chart) 1 each PRN DAILY  PRN MC SEE 

COMMENTS;  Start 4/11/20 at 13:30;  Stop 4/11/20 at 13:33;  Status DC


Info (PHARMACY MONITORING -- do not chart) 1 each PRN DAILY  PRN MC SEE 

COMMENTS;  Start 4/11/20 at 13:30;  Stop 4/11/20 at 13:34;  Status DC


Sodium Chloride 90 meq/Potassium Phosphate 19 mmol/ Magnesium Sulfate 12 me

q/Calcium Gluconate 15 meq/ Multivitamins 10 ml/Chromium/ Copper/Manganese/ 

Seleni/Zn 0.5 ml/ Insulin Human Regular 40 unit/ Total Parenteral 

Nutrition/Amino Acids/Dextrose/ Fat Emulsion Intravenous 1,400 ml @  58.333 mls/

hr TPN  CONT IV  Last administered on 4/12/20at 21:54;  Start 4/12/20 at 22:00; 

Stop 4/13/20 at 21:59;  Status DC


Sodium Chloride 1,000 ml @  1,000 mls/hr Q1H PRN IV hypotension;  Start 4/13/20 

at 09:35;  Stop 4/13/20 at 15:34;  Status DC


Albumin Human 200 ml @  200 mls/hr 1X PRN  PRN IV Hypotension;  Start 4/13/20 at

09:45;  Stop 4/13/20 at 15:44;  Status DC


Diphenhydramine HCl (Benadryl) 25 mg 1X PRN  PRN IV ITCHING;  Start 4/13/20 at 

09:45;  Stop 4/14/20 at 09:44;  Status DC


Diphenhydramine HCl (Benadryl) 25 mg 1X PRN  PRN IV ITCHING;  Start 4/13/20 at 

09:45;  Stop 4/14/20 at 09:44;  Status DC


Sodium Chloride 1,000 ml @  400 mls/hr Q2H30M PRN IV PATENCY;  Start 4/13/20 at 

09:35;  Stop 4/13/20 at 21:34;  Status DC


Info (PHARMACY MONITORING -- do not chart) 1 each PRN DAILY  PRN MC SEE CO

MMENTS;  Start 4/13/20 at 09:45;  Status Cancel


Sodium Chloride 100 meq/Potassium Phosphate 19 mmol/ Magnesium Sulfate 12 meq/Ca

lcium Gluconate 15 meq/ Multivitamins 10 ml/Chromium/ Copper/Manganese/ 

Seleni/Zn 0.5 ml/ Insulin Human Regular 40 unit/ Potassium Chloride 20 meq/ 

Total Parenteral Nutrition/Amino Acids/Dextrose/ Fat Emulsion Intravenous 1,400 

ml @  58.333 mls/ hr TPN  CONT IV  Last administered on 4/13/20at 22:02;  Start 

4/13/20 at 22:00;  Stop 4/14/20 at 21:59;  Status DC


Furosemide (Lasix) 40 mg 1X  ONCE IVP  Last administered on 4/13/20at 14:39;  

Start 4/13/20 at 14:30;  Stop 4/13/20 at 14:31;  Status DC


Metronidazole 100 ml @  100 mls/hr Q8HRS IV  Last administered on 4/21/20at 

06:04;  Start 4/14/20 at 10:00;  Stop 4/21/20 at 08:10;  Status DC


Sodium Chloride 1,000 ml @  1,000 mls/hr Q1H PRN IV hypotension;  Start 4/14/20 

at 08:00;  Stop 4/14/20 at 13:59;  Status DC


Albumin Human 200 ml @  200 mls/hr 1X PRN  PRN IV Hypotension;  Start 4/14/20 at

08:00;  Stop 4/14/20 at 13:59;  Status DC


Sodium Chloride 1,000 ml @  400 mls/hr Q2H30M PRN IV PATENCY;  Start 4/14/20 at 

08:00;  Stop 4/14/20 at 19:59;  Status DC


Info (PHARMACY MONITORING -- do not chart) 1 each PRN DAILY  PRN MC SEE 

COMMENTS;  Start 4/14/20 at 11:30;  Status UNV


Info (PHARMACY MONITORING -- do not chart) 1 each PRN DAILY  PRN MC SEE 

COMMENTS;  Start 4/14/20 at 11:30;  Stop 4/16/20 at 12:13;  Status DC


Sodium Chloride 100 meq/Potassium Phosphate 19 mmol/ Magnesium Sulfate 12 

meq/Calcium Gluconate 15 meq/ Multivitamins 10 ml/Chromium/ Copper/Manganese/ 

Seleni/Zn 0.5 ml/ Insulin Human Regular 40 unit/ Potassium Chloride 20 meq/ 

Total Parenteral Nutrition/Amino Acids/Dextrose/ Fat Emulsion Intravenous 1,400 

ml @  58.333 mls/ hr TPN  CONT IV  Last administered on 4/14/20at 21:52;  Start 

4/14/20 at 22:00;  Stop 4/15/20 at 21:59;  Status DC


Sodium Chloride (Normal Saline Flush) 10 ml QSHIFT  PRN IV AFTER MEDS AND BLOOD 

DRAWS;  Start 4/14/20 at 15:00;  Stop 5/12/20 at 11:27;  Status DC


Sodium Chloride (Normal Saline Flush) 10 ml PRN Q5MIN  PRN IV AFTER MEDS AND 

BLOOD DRAWS;  Start 4/14/20 at 15:00


Sodium Chloride (Normal Saline Flush) 20 ml PRN Q5MIN  PRN IV AFTER MEDS AND 

BLOOD DRAWS;  Start 4/14/20 at 15:00


Sodium Chloride 100 meq/Potassium Phosphate 19 mmol/ Magnesium Sulfate 12 

meq/Calcium Gluconate 15 meq/ Multivitamins 10 ml/Chromium/ Copper/Manganese/ 

Seleni/Zn 0.5 ml/ Insulin Human Regular 40 unit/ Potassium Chloride 20 meq/ 

Total Parenteral Nutrition/Amino Acids/Dextrose/ Fat Emulsion Intravenous 1,400 

ml @  58.333 mls/ hr TPN  CONT IV  Last administered on 4/15/20at 21:20;  Start 

4/15/20 at 22:00;  Stop 4/16/20 at 21:59;  Status DC


Lidocaine HCl (Buffered Lidocaine 1%) 3 ml STK-MED ONCE .ROUTE ;  Start 4/15/20 

at 13:16;  Stop 4/15/20 at 13:16;  Status DC


Lidocaine HCl (Buffered Lidocaine 1%) 6 ml 1X  ONCE INJ  Last administered on 

4/15/20at 13:45;  Start 4/15/20 at 13:30;  Stop 4/15/20 at 13:31;  Status DC


Albumin Human 100 ml @  100 mls/hr 1X  ONCE IV  Last administered on 4/15/20at 

15:41;  Start 4/15/20 at 15:00;  Stop 4/15/20 at 15:59;  Status DC


Albumin Human 50 ml @ 50 mls/hr 1X  ONCE IV  Last administered on 4/15/20at 

15:00;  Start 4/15/20 at 15:00;  Stop 4/15/20 at 15:59;  Status DC


Info (PHARMACY MONITORING -- do not chart) 1 each PRN DAILY  PRN MC SEE 

COMMENTS;  Start 4/16/20 at 11:30;  Status Cancel


Info (PHARMACY MONITORING -- do not chart) 1 each PRN DAILY  PRN MC SEE 

COMMENTS;  Start 4/16/20 at 11:30;  Status UNV


Sodium Chloride 100 meq/Potassium Phosphate 10 mmol/ Magnesium Sulfate 12 

meq/Calcium Gluconate 15 meq/ Multivitamins 10 ml/Chromium/ Copper/Manganese/ 

Seleni/Zn 0.5 ml/ Insulin Human Regular 35 unit/ Potassium Chloride 20 meq/ 

Total Parenteral Nutrition/Amino Acids/Dextrose/ Fat Emulsion Intravenous 1,400 

ml @  58.333 mls/ hr TPN  CONT IV  Last administered on 4/16/20at 22:10;  Start 

4/16/20 at 22:00;  Stop 4/17/20 at 21:59;  Status DC


Sodium Chloride 100 meq/Potassium Phosphate 5 mmol/ Magnesium Sulfate 12 

meq/Calcium Gluconate 15 meq/ Multivitamins 10 ml/Chromium/ Copper/Manganese/ 

Seleni/Zn 0.5 ml/ Insulin Human Regular 35 unit/ Potassium Chloride 20 meq/ 

Total Parenteral Nutrition/Amino Acids/Dextrose/ Fat Emulsion Intravenous 1,400 

ml @  58.333 mls/ hr TPN  CONT IV  Last administered on 4/17/20at 22:59;  Start 

4/17/20 at 22:00;  Stop 4/18/20 at 21:59;  Status DC


Sodium Chloride 1,000 ml @  1,000 mls/hr Q1H PRN IV hypotension;  Start 4/18/20 

at 08:27;  Stop 4/18/20 at 14:26;  Status DC


Albumin Human 200 ml @  200 mls/hr 1X PRN  PRN IV Hypotension Last administered 

on 4/18/20at 09:18;  Start 4/18/20 at 08:30;  Stop 4/18/20 at 14:29;  Status DC


Sodium Chloride 1,000 ml @  400 mls/hr Q2H30M PRN IV PATENCY;  Start 4/18/20 at 

08:27;  Stop 4/18/20 at 20:26;  Status DC


Info (PHARMACY MONITORING -- do not chart) 1 each PRN DAILY  PRN MC SEE 

COMMENTS;  Start 4/18/20 at 08:30;  Status Cancel


Info (PHARMACY MONITORING -- do not chart) 1 each PRN DAILY  PRN MC SEE 

COMMENTS;  Start 4/18/20 at 08:30;  Stop 4/26/20 at 13:10;  Status DC


Sodium Chloride 100 meq/Potassium Chloride 40 meq/ Magnesium Sulfate 15 

meq/Calcium Gluconate 15 meq/ Multivitamins 10 ml/Chromium/ Copper/Manganese/ 

Seleni/Zn 0.5 ml/ Insulin Human Regular 35 unit/ Total Parenteral Nutrition/Ami

no Acids/Dextrose/ Fat Emulsion Intravenous 1,400 ml @  58.333 mls/ hr TPN  CONT

IV  Last administered on 4/18/20at 22:00;  Start 4/18/20 at 22:00;  Stop 4/19/20

at 21:59;  Status DC


Potassium Chloride/Water 100 ml @  100 mls/hr 1X  ONCE IV  Last administered on 

4/18/20at 17:28;  Start 4/18/20 at 14:45;  Stop 4/18/20 at 15:44;  Status DC


Sodium Chloride 100 meq/Potassium Chloride 40 meq/ Magnesium Sulfate 15 

meq/Calcium Gluconate 15 meq/ Multivitamins 10 ml/Chromium/ Copper/Manganese/ 

Seleni/Zn 0.5 ml/ Insulin Human Regular 35 unit/ Total Parenteral 

Nutrition/Amino Acids/Dextrose/ Fat Emulsion Intravenous 1,400 ml @  58.333 mls/

hr TPN  CONT IV  Last administered on 4/19/20at 22:46;  Start 4/19/20 at 22:00; 

Stop 4/20/20 at 21:59;  Status DC


Sodium Chloride 100 meq/Potassium Chloride 40 meq/ Magnesium Sulfate 20 

meq/Calcium Gluconate 15 meq/ Multivitamins 10 ml/Chromium/ Copper/Manganese/ 

Seleni/Zn 0.5 ml/ Insulin Human Regular 35 unit/ Total Parenteral 

Nutrition/Amino Acids/Dextrose/ Fat Emulsion Intravenous 1,400 ml @  58.333 mls/

hr TPN  CONT IV  Last administered on 4/20/20at 22:31;  Start 4/20/20 at 22:00; 

Stop 4/21/20 at 21:59;  Status DC


Fentanyl Citrate (Fentanyl 2ml Vial) 50 mcg PRN Q2HR  PRN IVP PAIN Last 

administered on 4/27/20at 13:32;  Start 4/20/20 at 21:00;  Stop 4/28/20 at 

12:53;  Status DC


Fentanyl Citrate (Fentanyl 2ml Vial) 25 mcg PRN Q2HR  PRN IVP PAIN;  Start 4/20 /20 at 21:00;  Stop 4/28/20 at 12:54;  Status DC


Enoxaparin Sodium (Lovenox 100mg Syringe) 100 mg Q12HR SQ ;  Start 4/21/20 at 2

1:00;  Status UNV


Amino Acids/ Glycerin/ Electrolytes 1,000 ml @  75 mls/hr [id number] IV ;  Start 

4/20/20 at 21:15;  Status UNV


Sodium Chloride 1,000 ml @  1,000 mls/hr Q1H PRN IV hypotension;  Start 4/21/20 

at 07:56;  Stop 4/21/20 at 13:55;  Status DC


Albumin Human 200 ml @  200 mls/hr 1X PRN  PRN IV Hypotension Last administered 

on 4/21/20at 08:40;  Start 4/21/20 at 08:00;  Stop 4/21/20 at 13:59;  Status DC


Sodium Chloride 1,000 ml @  400 mls/hr Q2H30M PRN IV PATENCY;  Start 4/21/20 at 

07:56;  Stop 4/21/20 at 19:55;  Status DC


Info (PHARMACY MONITORING -- do not chart) 1 each PRN DAILY  PRN MC SEE 

COMMENTS;  Start 4/21/20 at 08:00;  Status UNV


Info (PHARMACY MONITORING -- do not chart) 1 each PRN DAILY  PRN MC SEE 

COMMENTS;  Start 4/21/20 at 08:00;  Status UNV


Daptomycin 430 mg/ Sodium Chloride 50 ml @  100 mls/hr Q24H IV  Last 

administered on 4/21/20at 12:35;  Start 4/21/20 at 09:00;  Stop 4/21/20 at 

12:49;  Status DC


Sodium Chloride 100 meq/Potassium Chloride 40 meq/ Magnesium Sulfate 20 

meq/Calcium Gluconate 15 meq/ Multivitamins 10 ml/Chromium/ Copper/Manganese/ 

Seleni/Zn 0.5 ml/ Insulin Human Regular 35 unit/ Total Parenteral Nut

rition/Amino Acids/Dextrose/ Fat Emulsion Intravenous 1,400 ml @  58.333 mls/ hr

TPN  CONT IV  Last administered on 4/21/20at 21:26;  Start 4/21/20 at 22:00;  

Stop 4/22/20 at 21:59;  Status DC


Daptomycin 430 mg/ Sodium Chloride 50 ml @  100 mls/hr Q48H IV ;  Start 4/23/20 

at 09:00;  Stop 4/22/20 at 11:55;  Status DC


Sodium Chloride 100 meq/Potassium Chloride 40 meq/ Magnesium Sulfate 20 

meq/Calcium Gluconate 15 meq/ Multivitamins 10 ml/Chromium/ Copper/Manganese/ 

Seleni/Zn 0.5 ml/ Insulin Human Regular 35 unit/ Total Parenteral 

Nutrition/Amino Acids/Dextrose/ Fat Emulsion Intravenous 1,400 ml @  58.333 mls/

hr TPN  CONT IV  Last administered on 4/22/20at 22:27;  Start 4/22/20 at 22:00; 

Stop 4/23/20 at 21:59;  Status DC


Daptomycin 430 mg/ Sodium Chloride 50 ml @  100 mls/hr Q24H IV  Last 

administered on 4/24/20at 15:07;  Start 4/22/20 at 13:00;  Stop 4/25/20 at 

13:15;  Status DC


Sodium Chloride 100 meq/Potassium Chloride 40 meq/ Magnesium Sulfate 20 

meq/Calcium Gluconate 10 meq/ Multivitamins 10 ml/Chromium/ Copper/Manganese/ 

Seleni/Zn 0.5 ml/ Insulin Human Regular 35 unit/ Total Parenteral 

Nutrition/Amino Acids/Dextrose/ Fat Emulsion Intravenous 1,400 ml @  58.333 mls/

hr TPN  CONT IV  Last administered on 4/24/20at 00:06;  Start 4/23/20 at 22:00; 

Stop 4/24/20 at 21:59;  Status DC


Alteplase, Recombinant (Cathflo For Central Catheter Clearance) 1 mg 1X  ONCE 

INT CAT  Last administered on 4/24/20at 11:44;  Start 4/24/20 at 10:45;  Stop 

4/24/20 at 10:46;  Status DC


Ondansetron HCl (Zofran) 4 mg PRN Q6HRS  PRN IV NAUSEA/VOMITING;  Start 4/27/20 

at 07:00;  Stop 4/28/20 at 06:59;  Status DC


Fentanyl Citrate (Fentanyl 2ml Vial) 25 mcg PRN Q5MIN  PRN IV MILD PAIN 1-3;  

Start 4/27/20 at 07:00;  Stop 4/28/20 at 06:59;  Status DC


Fentanyl Citrate (Fentanyl 2ml Vial) 50 mcg PRN Q5MIN  PRN IV MODERATE TO SEVERE

PAIN Last administered on 4/27/20at 10:17;  Start 4/27/20 at 07:00;  Stop 

4/28/20 at 06:59;  Status DC


Ringer's Solution 1,000 ml @  30 mls/hr Q24H IV ;  Start 4/27/20 at 07:00;  Stop

4/27/20 at 18:59;  Status DC


Lidocaine HCl (Xylocaine-Mpf 1% 2ml Vial) 2 ml PRN 1X  PRN ID PRIOR TO IV START;

 Start 4/27/20 at 07:00;  Stop 4/28/20 at 06:59;  Status DC


Prochlorperazine Edisylate (Compazine) 5 mg PACU PRN  PRN IV NAUSEA, MRX1;  

Start 4/27/20 at 07:00;  Stop 4/28/20 at 06:59;  Status DC


Sodium Acetate 50 meq/Potassium Acetate 55 meq/ Magnesium Sulfate 20 meq/Calcium

Gluconate 10 meq/ Multivitamins 10 ml/Chromium/ Copper/Manganese/ Seleni/Zn 0.5 

ml/ Insulin Human Regular 35 unit/ Total Parenteral Nutrition/Amino Acids/Dext

verenice/ Fat Emulsion Intravenous 1,400 ml @  58.333 mls/ hr TPN  CONT IV ;  Start 

4/24/20 at 22:00;  Stop 4/24/20 at 14:15;  Status DC


Sodium Acetate 50 meq/Potassium Acetate 55 meq/ Magnesium Sulfate 20 meq/Calcium

Gluconate 10 meq/ Multivitamins 10 ml/Chromium/ Copper/Manganese/ Seleni/Zn 0.5 

ml/ Insulin Human Regular 35 unit/ Total Parenteral Nutrition/Amino 

Acids/Dextrose/ Fat Emulsion Intravenous 1,800 ml @  75 mls/hr TPN  CONT IV  

Last administered on 4/24/20at 22:38;  Start 4/24/20 at 22:00;  Stop 4/25/20 at 

21:59;  Status DC


Sodium Chloride 1,000 ml @  1,000 mls/hr Q1H PRN IV hypotension;  Start 4/24/20 

at 15:31;  Stop 4/24/20 at 21:30;  Status DC


Diphenhydramine HCl (Benadryl) 25 mg 1X PRN  PRN IV ITCHING;  Start 4/24/20 at 

15:45;  Stop 4/25/20 at 15:44;  Status DC


Diphenhydramine HCl (Benadryl) 25 mg 1X PRN  PRN IV ITCHING;  Start 4/24/20 at 

15:45;  Stop 4/25/20 at 15:44;  Status DC


Sodium Chloride 1,000 ml @  400 mls/hr Q2H30M PRN IV PATENCY;  Start 4/24/20 at 

15:31;  Stop 4/25/20 at 03:30;  Status DC


Info (PHARMACY MONITORING -- do not chart) 1 each PRN DAILY  PRN MC SEE 

COMMENTS;  Start 4/24/20 at 15:45;  Stop 5/26/20 at 14:14;  Status DC


Sodium Acetate 50 meq/Potassium Acetate 55 meq/ Magnesium Sulfate 20 meq/Calcium

Gluconate 10 meq/ Multivitamins 10 ml/Chromium/ Copper/Manganese/ Seleni/Zn 0.5 

ml/ Insulin Human Regular 35 unit/ Total Parenteral Nutrition/Amino Acids/Dextr

ose/ Fat Emulsion Intravenous 1,800 ml @  75 mls/hr TPN  CONT IV  Last 

administered on 4/25/20at 22:03;  Start 4/25/20 at 22:00;  Stop 4/26/20 at 

21:59;  Status DC


Daptomycin 430 mg/ Sodium Chloride 50 ml @  100 mls/hr Q24H IV  Last 

administered on 4/30/20at 13:00;  Start 4/25/20 at 13:00;  Stop 4/30/20 at 

20:58;  Status DC


Heparin Sodium (Porcine) 1000 unit/Sodium Chloride 1,001 ml @  1,001 mls/hr 1X  

ONCE IRR ;  Start 4/27/20 at 06:00;  Stop 4/27/20 at 06:59;  Status DC


Potassium Acetate 55 meq/Magnesium Sulfate 20 meq/ Calcium Gluconate 10 meq/ 

Multivitamins 10 ml/Chromium/ Copper/Manganese/ Seleni/Zn 0.5 ml/ Insulin Human 

Regular 35 unit/ Total Parenteral Nutrition/Amino Acids/Dextrose/ Fat Emulsion 

Intravenous 1,920 ml @  80 mls/hr TPN  CONT IV  Last administered on 4/26/20at 

22:10;  Start 4/26/20 at 22:00;  Stop 4/27/20 at 21:59;  Status DC


Dexamethasone Sodium Phosphate (Decadron) 4 mg STK-MED ONCE .ROUTE ;  Start 

4/27/20 at 10:56;  Stop 4/27/20 at 10:57;  Status DC


Ondansetron HCl (Zofran) 4 mg STK-MED ONCE .ROUTE ;  Start 4/27/20 at 10:56;  

Stop 4/27/20 at 10:57;  Status DC


Rocuronium Bromide (Zemuron) 50 mg STK-MED ONCE .ROUTE ;  Start 4/27/20 at 

10:56;  Stop 4/27/20 at 10:57;  Status DC


Fentanyl Citrate (Fentanyl 2ml Vial) 100 mcg STK-MED ONCE .ROUTE ;  Start 

4/27/20 at 10:56;  Stop 4/27/20 at 10:57;  Status DC


Bupivacaine HCl/ Epinephrine Bitart (Sensorcain-Epi 0.5%-1:690074 Mpf) 30 ml 

STK-MED ONCE .ROUTE  Last administered on 4/27/20at 12:01;  Start 4/27/20 at 

10:58;  Stop 4/27/20 at 10:58;  Status DC


Cellulose (Surgicel Hemostat 2x14) 1 each STK-MED ONCE .ROUTE ;  Start 4/27/20 

at 10:58;  Stop 4/27/20 at 10:59;  Status DC


Iohexol (Omnipaque 300 Mg/ml) 50 ml STK-MED ONCE .ROUTE ;  Start 4/27/20 at 

10:58;  Stop 4/27/20 at 10:59;  Status DC


Cellulose (Surgicel Hemostat 4x8) 1 each STK-MED ONCE .ROUTE ;  Start 4/27/20 at

10:58;  Stop 4/27/20 at 10:59;  Status DC


Bisacodyl (Dulcolax Supp) 10 mg STK-MED ONCE .ROUTE ;  Start 4/27/20 at 10:59;  

Stop 4/27/20 at 10:59;  Status DC


Heparin Sodium (Porcine) 1000 unit/Sodium Chloride 1,001 ml @  1,001 mls/hr 1X  

ONCE IRR ;  Start 4/27/20 at 12:00;  Stop 4/27/20 at 12:59;  Status DC


Propofol 20 ml @ As Directed STK-MED ONCE IV ;  Start 4/27/20 at 11:05;  Stop 

4/27/20 at 11:05;  Status DC


Sevoflurane (Ultane) 90 ml STK-MED ONCE IH ;  Start 4/27/20 at 11:05;  Stop 

4/27/20 at 11:05;  Status DC


Sevoflurane (Ultane) 60 ml STK-MED ONCE IH ;  Start 4/27/20 at 12:26;  Stop 

4/27/20 at 12:27;  Status DC


Propofol 20 ml @ As Directed STK-MED ONCE IV ;  Start 4/27/20 at 12:26;  Stop 

4/27/20 at 12:27;  Status DC


Phenylephrine HCl (PHENYLEPHRINE in 0.9% NACL PF) 1 mg STK-MED ONCE IV ;  Start 

4/27/20 at 12:34;  Stop 4/27/20 at 12:34;  Status DC


Heparin Sodium (Porcine) (Heparin Sodium) 5,000 unit Q12HR SQ  Last administered

on 5/6/20at 20:57;  Start 4/27/20 at 21:00;  Stop 5/7/20 at 09:59;  Status DC


Sodium Chloride (Normal Saline Flush) 3 ml QSHIFT  PRN IV AFTER MEDS AND BLOOD 

DRAWS;  Start 4/27/20 at 13:45;  Status Cancel


Naloxone HCl (Narcan) 0.4 mg PRN Q2MIN  PRN IV SEE INSTRUCTIONS Last 

administered on 6/6/20at 15:15;  Start 4/27/20 at 13:45;  Stop 7/1/20 at 16:00; 

Status DC


Sodium Chloride 1,000 ml @  25 mls/hr Q24H IV  Last administered on 5/26/20at 

13:37;  Start 4/27/20 at 13:37;  Stop 5/29/20 at 13:09;  Status DC


Naloxone HCl (Narcan) 0.4 mg PRN Q2MIN  PRN IV SEE INSTRUCTIONS;  Start 4/27/20 

at 14:30;  Status UNV


Sodium Chloride 1,000 ml @  25 mls/hr Q24H IV ;  Start 4/27/20 at 14:30;  Status

UNV


Hydromorphone HCl 30 ml @ 0 mls/hr CONT PRN  PRN IV PER PROTOCOL Last administer

ed on 5/2/20at 16:08;  Start 4/27/20 at 14:30;  Stop 5/4/20 at 08:55;  Status DC


Potassium Acetate 55 meq/Magnesium Sulfate 20 meq/ Calcium Gluconate 10 meq/ 

Multivitamins 10 ml/Chromium/ Copper/Manganese/ Seleni/Zn 0.5 ml/ Insulin Human 

Regular 35 unit/ Total Parenteral Nutrition/Amino Acids/Dextrose/ Fat Emulsion 

Intravenous 1,920 ml @  80 mls/hr TPN  CONT IV  Last administered on 4/27/20at 

22:01;  Start 4/27/20 at 22:00;  Stop 4/28/20 at 21:59;  Status DC


Bumetanide (Bumex) 2 mg [id number] IV  Last administered on 5/1/20at 13:50;  Start 

4/28/20 at 14:00;  Stop 5/2/20 at 14:10;  Status DC


Meropenem 1 gm/ Sodium Chloride 100 ml @  200 mls/hr Q8HRS IV  Last administered

on 5/22/20at 05:53;  Start 4/28/20 at 14:00;  Stop 5/22/20 at 09:31;  Status DC


Potassium Acetate 55 meq/Magnesium Sulfate 20 meq/ Calcium Gluconate 10 meq/ 

Multivitamins 10 ml/Chromium/ Copper/Manganese/ Seleni/Zn 0.5 ml/ Insulin Human 

Regular 35 unit/ Total Parenteral Nutrition/Amino Acids/Dextrose/ Fat Emulsion 

Intravenous 1,920 ml @  80 mls/hr TPN  CONT IV  Last administered on 4/28/20at 

22:02;  Start 4/28/20 at 22:00;  Stop 4/29/20 at 21:59;  Status DC


Hydromorphone HCl (Dilaudid Standard PCA) 12 mg STK-MED ONCE IV ;  Start 4/27/20

at 14:35;  Stop 4/28/20 at 13:53;  Status DC


Artificial Tears (Artificial Tears) 1 drop PRN Q15MIN  PRN OU DRY EYE Last 

administered on 6/23/20at 21:17;  Start 4/29/20 at 05:30


Hydromorphone HCl (Dilaudid Standard PCA) 12 mg STK-MED ONCE IV ;  Start 4/28/20

at 12:05;  Stop 4/29/20 at 09:15;  Status DC


Potassium Acetate 65 meq/Magnesium Sulfate 20 meq/ Calcium Gluconate 10 meq/ 

Multivitamins 10 ml/Chromium/ Copper/Manganese/ Seleni/Zn 0.5 ml/ Insulin Human 

Regular 30 unit/ Total Parenteral Nutrition/Amino Acids/Dextrose/ Fat Emulsion 

Intravenous 1,920 ml @  80 mls/hr TPN  CONT IV  Last administered on 4/29/20at 

22:22;  Start 4/29/20 at 22:00;  Stop 4/30/20 at 21:59;  Status DC


Cyclobenzaprine HCl (Flexeril) 10 mg PRN Q6HRS  PRN PO MUSCLE SPASMS;  Start 

4/30/20 at 10:45


Potassium Acetate 55 meq/Magnesium Sulfate 20 meq/ Calcium Gluconate 10 meq/ 

Multivitamins 10 ml/Chromium/ Copper/Manganese/ Seleni/Zn 0.5 ml/ Insulin Human 

Regular 30 unit/ Total Parenteral Nutrition/Amino Acids/Dextrose/ Fat Emulsion 

Intravenous 1,920 ml @  80 mls/hr TPN  CONT IV  Last administered on 5/1/20at 

01:00;  Start 4/30/20 at 22:00;  Stop 5/1/20 at 21:59;  Status DC


Magnesium Sulfate 50 ml @ 25 mls/hr 1X  ONCE IV  Last administered on 4/30/20at 

17:18;  Start 4/30/20 at 12:45;  Stop 4/30/20 at 14:44;  Status DC


Potassium Chloride/Water 100 ml @  100 mls/hr 1X  ONCE IV  Last administered on 

5/1/20at 11:27;  Start 5/1/20 at 12:00;  Stop 5/1/20 at 12:59;  Status DC


Hydromorphone HCl (Dilaudid Standard PCA) 12 mg STK-MED ONCE IV ;  Start 4/29/20

at 10:50;  Stop 5/1/20 at 11:02;  Status DC


Hydromorphone HCl (Dilaudid Standard PCA) 12 mg STK-MED ONCE IV ;  Start 4/30/20

at 13:47;  Stop 5/1/20 at 11:03;  Status DC


Potassium Acetate 30 meq/Magnesium Sulfate 20 meq/ Calcium Gluconate 10 meq/ 

Multivitamins 10 ml/Chromium/ Copper/Manganese/ Seleni/Zn 0.5 ml/ Insulin Human 

Regular 30 unit/ Potassium Chloride 30 meq/ Total Parenteral Nutrition/Amino 

Acids/Dextrose/ Fat Emulsion Intravenous 1,920 ml @  80 mls/hr TPN  CONT IV  

Last administered on 5/1/20at 22:34;  Start 5/1/20 at 22:00;  Stop 5/2/20 at 

21:59;  Status DC


Potassium Chloride/Water 100 ml @  100 mls/hr Q1H IV  Last administered on 

5/2/20at 13:05;  Start 5/2/20 at 07:00;  Stop 5/2/20 at 10:59;  Status DC


Magnesium Sulfate 50 ml @ 25 mls/hr 1X  ONCE IV  Last administered on 5/2/20at 

10:34;  Start 5/2/20 at 10:30;  Stop 5/2/20 at 12:29;  Status DC


Potassium Chloride 75 meq/ Magnesium Sulfate 20 meq/Calcium Gluconate 10 meq/ 

Multivitamins 10 ml/Chromium/ Copper/Manganese/ Seleni/Zn 0.5 ml/ Insulin Human 

Regular 30 unit/ Total Parenteral Nutrition/Amino Acids/Dextrose/ Fat Emulsion 

Intravenous 1,920 ml @  80 mls/hr TPN  CONT IV  Last administered on 5/2/20at 

21:51;  Start 5/2/20 at 22:00;  Stop 5/3/20 at 22:00;  Status DC


Potassium Chloride 75 meq/ Magnesium Sulfate 20 meq/Calcium Gluconate 10 meq/ 

Multivitamins 10 ml/Chromium/ Copper/Manganese/ Seleni/Zn 0.5 ml/ Insulin Human 

Regular 25 unit/ Total Parenteral Nutrition/Amino Acids/Dextrose/ Fat Emulsion 

Intravenous 1,920 ml @  80 mls/hr TPN  CONT IV  Last administered on 5/3/20at 

22:04;  Start 5/3/20 at 22:00;  Stop 5/4/20 at 21:59;  Status DC


Hydromorphone HCl (Dilaudid) 0.4 mg PRN Q4HRS  PRN IVP PAIN Last administered on

5/4/20at 10:57;  Start 5/4/20 at 09:00;  Stop 5/4/20 at 18:59;  Status DC


Micafungin Sodium 100 mg/Dextrose 100 ml @  100 mls/hr Q24H IV  Last 

administered on 5/26/20at 12:17;  Start 5/4/20 at 11:00;  Stop 5/27/20 at 09:59;

 Status DC


Daptomycin 485 mg/ Sodium Chloride 50 ml @  100 mls/hr Q24H IV  Last 

administered on 5/11/20at 13:10;  Start 5/4/20 at 11:00;  Stop 5/12/20 at 07:44;

 Status DC


Potassium Chloride 75 meq/ Magnesium Sulfate 15 meq/Calcium Gluconate 8 meq/ 

Multivitamins 10 ml/Chromium/ Copper/Manganese/ Seleni/Zn 0.5 ml/ Insulin Human 

Regular 25 unit/ Total Parenteral Nutrition/Amino Acids/Dextrose/ Fat Emulsion 

Intravenous 1,920 ml @  80 mls/hr TPN  CONT IV  Last administered on 5/4/20at 

23:08;  Start 5/4/20 at 22:00;  Stop 5/5/20 at 21:59;  Status DC


Haloperidol Lactate (Haldol Inj) 3 mg 1X  ONCE IVP  Last administered on 

5/4/20at 14:37;  Start 5/4/20 at 14:30;  Stop 5/4/20 at 14:31;  Status DC


Hydromorphone HCl (Dilaudid) 1 mg PRN Q4HRS  PRN IVP PAIN Last administered on 

5/18/20at 06:25;  Start 5/4/20 at 19:00;  Stop 5/18/20 at 17:10;  Status DC


Potassium Chloride 75 meq/ Magnesium Sulfate 15 meq/Calcium Gluconate 8 meq/ 

Multivitamins 10 ml/Chromium/ Copper/Manganese/ Seleni/Zn 0.5 ml/ Insulin Human 

Regular 20 unit/ Total Parenteral Nutrition/Amino Acids/Dextrose/ Fat Emulsion 

Intravenous 1,920 ml @  80 mls/hr TPN  CONT IV  Last administered on 5/5/20at 

22:10;  Start 5/5/20 at 22:00;  Stop 5/6/20 at 21:59;  Status DC


Lidocaine HCl (Buffered Lidocaine 1%) 3 ml STK-MED ONCE .ROUTE ;  Start 5/6/20 

at 11:31;  Stop 5/6/20 at 11:31;  Status DC


Lidocaine HCl (Buffered Lidocaine 1%) 3 ml STK-MED ONCE .ROUTE ;  Start 5/6/20 

at 12:28;  Stop 5/6/20 at 12:29;  Status DC


Lidocaine HCl (Buffered Lidocaine 1%) 6 ml 1X  ONCE INJ  Last administered on 

5/6/20at 12:53;  Start 5/6/20 at 12:45;  Stop 5/6/20 at 12:46;  Status DC


Potassium Chloride 75 meq/ Magnesium Sulfate 15 meq/Calcium Gluconate 8 meq/ Mu

ltivitamins 10 ml/Chromium/ Copper/Manganese/ Seleni/Zn 0.5 ml/ Insulin Human 

Regular 20 unit/ Total Parenteral Nutrition/Amino Acids/Dextrose/ Fat Emulsion 

Intravenous 1,920 ml @  80 mls/hr TPN  CONT IV  Last administered on 5/6/20at 

22:00;  Start 5/6/20 at 22:00;  Stop 5/7/20 at 21:59;  Status DC


Potassium Chloride 75 meq/ Magnesium Sulfate 15 meq/Calcium Gluconate 8 meq/ 

Multivitamins 10 ml/Chromium/ Copper/Manganese/ Seleni/Zn 0.5 ml/ Insulin Human 

Regular 15 unit/ Total Parenteral Nutrition/Amino Acids/Dextrose/ Fat Emulsion 

Intravenous 1,920 ml @  80 mls/hr TPN  CONT IV  Last administered on 5/7/20at 

22:28;  Start 5/7/20 at 22:00;  Stop 5/8/20 at 21:59;  Status DC


Vecuronium Bromide (Norcuron Bolus) 6 mg PRN Q6HRS  PRN IV VENT ASYNCHRONY;  

Start 5/7/20 at 19:15;  Stop 5/7/20 at 19:35;  Status DC


Bumetanide (Bumex) 2 mg 1X  ONCE IV  Last administered on 5/7/20at 22:09;  Start

5/7/20 at 19:45;  Stop 5/7/20 at 19:46;  Status DC


Lidocaine HCl (Buffered Lidocaine 1%) 3 ml STK-MED ONCE .ROUTE ;  Start 5/8/20 

at 07:59;  Stop 5/8/20 at 07:59;  Status DC


Midazolam HCl (Versed) 5 mg STK-MED ONCE .ROUTE ;  Start 5/8/20 at 08:36;  Stop 

5/8/20 at 08:36;  Status DC


Fentanyl Citrate (Fentanyl 5ml Vial) 250 mcg STK-MED ONCE .ROUTE ;  Start 5/8/20

at 08:36;  Stop 5/8/20 at 08:37;  Status DC


Lidocaine HCl (Buffered Lidocaine 1%) 3 ml 1X  ONCE IJ  Last administered on 

5/8/20at 09:30;  Start 5/8/20 at 09:15;  Stop 5/8/20 at 09:16;  Status DC


Midazolam HCl (Versed) 5 mg 1X  ONCE IV  Last administered on 5/8/20at 09:30;  

Start 5/8/20 at 09:15;  Stop 5/8/20 at 09:16;  Status DC


Fentanyl Citrate (Fentanyl 5ml Vial) 250 mcg 1X  ONCE IV  Last administered on 

5/8/20at 09:30;  Start 5/8/20 at 09:15;  Stop 5/8/20 at 09:16;  Status DC


Bumetanide (Bumex) 2 mg DAILY IV  Last administered on 5/18/20at 08:07;  Start 

5/8/20 at 10:00;  Stop 5/18/20 at 17:15;  Status DC


Potassium Chloride 75 meq/ Magnesium Sulfate 15 meq/ Multivitamins 10 

ml/Chromium/ Copper/Manganese/ Seleni/Zn 0.5 ml/ Insulin Human Regular 15 unit/ 

Total Parenteral Nutrition/Amino Acids/Dextrose/ Fat Emulsion Intravenous 1,920 

ml @  80 mls/hr TPN  CONT IV  Last administered on 5/8/20at 21:59;  Start 5/8/20

at 22:00;  Stop 5/9/20 at 21:59;  Status DC


Metoclopramide HCl (Reglan Vial) 10 mg PRN Q3HRS  PRN IVP NAUSEA/VOMITING-3rd 

choice Last administered on 5/14/20at 04:25;  Start 5/9/20 at 16:45


Potassium Chloride 75 meq/ Magnesium Sulfate 15 meq/ Multivitamins 10 

ml/Chromium/ Copper/Manganese/ Seleni/Zn 0.5 ml/ Insulin Human Regular 15 unit/ 

Total Parenteral Nutrition/Amino Acids/Dextrose/ Fat Emulsion Intravenous 1,920 

ml @  80 mls/hr TPN  CONT IV  Last administered on 5/9/20at 22:41;  Start 5/9/20

at 22:00;  Stop 5/10/20 at 21:59;  Status DC


Magnesium Sulfate 50 ml @ 25 mls/hr 1X  ONCE IV  Last administered on 5/10/20at 

10:44;  Start 5/10/20 at 09:00;  Stop 5/10/20 at 10:59;  Status DC


Potassium Chloride/Water 100 ml @  100 mls/hr 1X  ONCE IV  Last administered on 

5/10/20at 09:37;  Start 5/10/20 at 09:00;  Stop 5/10/20 at 09:59;  Status DC


Duloxetine HCl (Cymbalta) 30 mg DAILY PO  Last administered on 5/11/20at 09:48; 

Start 5/10/20 at 14:00;  Stop 5/13/20 at 10:25;  Status DC


Potassium Chloride 80 meq/ Magnesium Sulfate 20 meq/ Multivitamins 10 

ml/Chromium/ Copper/Manganese/ Seleni/Zn 0.5 ml/ Insulin Human Regular 15 unit/ 

Total Parenteral Nutrition/Amino Acids/Dextrose/ Fat Emulsion Intravenous 1,920 

ml @  80 mls/hr TPN  CONT IV  Last administered on 5/10/20at 21:42;  Start 

5/10/20 at 22:00;  Stop 5/11/20 at 21:59;  Status DC


Potassium Chloride 80 meq/ Magnesium Sulfate 20 meq/ Multivitamins 10 

ml/Chromium/ Copper/Manganese/ Seleni/Zn 0.5 ml/ Insulin Human Regular 15 unit/ 

Total Parenteral Nutrition/Amino Acids/Dextrose/ Fat Emulsion Intravenous 1,920 

ml @  80 mls/hr TPN  CONT IV  Last administered on 5/11/20at 22:20;  Start 5/11 /20 at 22:00;  Stop 5/12/20 at 21:59;  Status DC


Lidocaine HCl (Buffered Lidocaine 1%) 3 ml STK-MED ONCE .ROUTE ;  Start 5/12/20 

at 09:54;  Stop 5/12/20 at 09:55;  Status DC


Hydromorphone HCl (Dilaudid Standard PCA) 12 mg STK-MED ONCE IV ;  Start 5/1/20 

at 15:50;  Stop 5/12/20 at 11:24;  Status DC


Potassium Chloride 80 meq/ Magnesium Sulfate 20 meq/ Multivitamins 10 

ml/Chromium/ Copper/Manganese/ Seleni/Zn 0.5 ml/ Insulin Human Regular 15 unit/ 

Total Parenteral Nutrition/Amino Acids/Dextrose/ Fat Emulsion Intravenous 1,920 

ml @  80 mls/hr TPN  CONT IV  Last administered on 5/12/20at 21:40;  Start 

5/12/20 at 22:00;  Stop 5/13/20 at 21:59;  Status DC


Lidocaine HCl (Buffered Lidocaine 1%) 6 ml 1X  ONCE INJ  Last administered on 

5/12/20at 14:15;  Start 5/12/20 at 14:15;  Stop 5/12/20 at 14:16;  Status DC


Potassium Chloride 80 meq/ Magnesium Sulfate 20 meq/ Multivitamins 10 

ml/Chromium/ Copper/Manganese/ Seleni/Zn 1 ml/ Insulin Human Regular 15 unit/ 

Total Parenteral Nutrition/Amino Acids/Dextrose/ Fat Emulsion Intravenous 1,920 

ml @  80 mls/hr TPN  CONT IV  Last administered on 5/13/20at 22:04;  Start 

5/13/20 at 22:00;  Stop 5/14/20 at 21:59;  Status DC


Potassium Chloride/Water 100 ml @  100 mls/hr 1X  ONCE IV  Last administered on 

5/14/20at 11:34;  Start 5/14/20 at 11:00;  Stop 5/14/20 at 11:59;  Status DC


Potassium Chloride 90 meq/ Magnesium Sulfate 20 meq/ Multivitamins 10 

ml/Chromium/ Copper/Manganese/ Seleni/Zn 1 ml/ Insulin Human Regular 15 unit/ 

Total Parenteral Nutrition/Amino Acids/Dextrose/ Fat Emulsion Intravenous 1,920 

ml @  80 mls/hr TPN  CONT IV  Last administered on 5/14/20at 22:57;  Start 

5/14/20 at 22:00;  Stop 5/15/20 at 21:59;  Status DC


Potassium Chloride 90 meq/ Magnesium Sulfate 20 meq/ Multivitamins 10 

ml/Chromium/ Copper/Manganese/ Seleni/Zn 1 ml/ Insulin Human Regular 15 unit/ 

Total Parenteral Nutrition/Amino Acids/Dextrose/ Fat Emulsion Intravenous 1,920 

ml @  80 mls/hr TPN  CONT IV  Last administered on 5/15/20at 22:48;  Start 

5/15/20 at 22:00;  Stop 5/16/20 at 21:59;  Status DC


Potassium Chloride 90 meq/ Magnesium Sulfate 20 meq/ Multivitamins 10 

ml/Chromium/ Copper/Manganese/ Seleni/Zn 1 ml/ Insulin Human Regular 15 unit/ 

Total Parenteral Nutrition/Amino Acids/Dextrose/ Fat Emulsion Intravenous 1,890 

ml @  78.75 mls/ hr TPN  CONT IV  Last administered on 5/16/20at 22:15;  Start 

5/16/20 at 22:00;  Stop 5/17/20 at 21:59;  Status DC


Linezolid/Dextrose 300 ml @  300 mls/hr Q12HR IV  Last administered on 5/19/20at

21:08;  Start 5/17/20 at 09:00;  Stop 5/20/20 at 08:11;  Status DC


Daptomycin 450 mg/ Sodium Chloride 50 ml @  100 mls/hr Q24H IV  Last 

administered on 5/20/20at 09:25;  Start 5/17/20 at 09:00;  Stop 5/21/20 at 

08:30;  Status DC


Potassium Chloride 90 meq/ Magnesium Sulfate 20 meq/ Multivitamins 10 

ml/Chromium/ Copper/Manganese/ Seleni/Zn 1 ml/ Insulin Human Regular 15 unit/ 

Total Parenteral Nutrition/Amino Acids/Dextrose/ Fat Emulsion Intravenous 1,890 

ml @  78.75 mls/ hr TPN  CONT IV  Last administered on 5/17/20at 21:34;  Start 

5/17/20 at 22:00;  Stop 5/18/20 at 21:59;  Status DC


Lorazepam (Ativan Inj) 2 mg STK-MED ONCE .ROUTE ;  Start 5/17/20 at 14:58;  Stop

5/17/20 at 14:58;  Status DC


Metoprolol Tartrate (Lopressor Vial) 5 mg 1X  ONCE IVP  Last administered on 

5/17/20at 15:31;  Start 5/17/20 at 15:15;  Stop 5/17/20 at 15:16;  Status DC


Lorazepam (Ativan Inj) 2 mg 1X  ONCE IVP  Last administered on 5/17/20at 15:30; 

Start 5/17/20 at 15:15;  Stop 5/17/20 at 15:16;  Status DC


Enoxaparin Sodium (Lovenox 40mg Syringe) 40 mg Q24H SQ  Last administered on 

6/5/20at 17:44;  Start 5/17/20 at 17:00;  Stop 6/7/20 at 06:50;  Status DC


Lorazepam (Ativan Inj) 1 mg PRN Q4HRS  PRN IVP ANXIETY / AGITATION MILD-MOD Last

administered on 5/31/20at 15:55;  Start 5/17/20 at 19:15;  Stop 6/2/20 at 11:45;

 Status DC


Lorazepam (Ativan Inj) 2 mg PRN Q4HRS  PRN IVP ANXIETY / AGITATION SEVERE Last 

administered on 6/1/20at 07:55;  Start 5/17/20 at 19:15;  Stop 6/2/20 at 11:45; 

Status DC


Fentanyl Citrate (Fentanyl 2ml Vial) 50 mcg PRN Q4HRS  PRN IVP SEVERE PAIN Last 

administered on 6/13/20at 05:15;  Start 5/18/20 at 13:15;  Stop 6/14/20 at 

09:29;  Status DC


Fentanyl Citrate (Fentanyl 2ml Vial) 25 mcg PRN Q4HRS  PRN IVP MODERATE PAIN 

Last administered on 6/13/20at 00:27;  Start 5/18/20 at 13:15;  Stop 6/14/20 at 

09:30;  Status DC


Potassium Chloride 90 meq/ Magnesium Sulfate 20 meq/ Multivitamins 10 

ml/Chromium/ Copper/Manganese/ Seleni/Zn 1 ml/ Insulin Human Regular 15 unit/ 

Total Parenteral Nutrition/Amino Acids/Dextrose/ Fat Emulsion Intravenous 1,890 

ml @  78.75 mls/ hr TPN  CONT IV  Last administered on 5/18/20at 22:18;  Start 

5/18/20 at 22:00;  Stop 5/19/20 at 21:59;  Status DC


Furosemide (Lasix) 40 mg 1X  ONCE IVP  Last administered on 5/18/20at 21:51;  

Start 5/18/20 at 21:45;  Stop 5/18/20 at 21:48;  Status DC


Albumin Human 100 ml @  100 mls/hr 1X PRN  PRN IV SEE COMMENTS;  Start 5/19/20 

at 01:30


Furosemide (Lasix) 40 mg [id number] IVP  Last administered on 6/3/20at 08:04;  Start 

5/19/20 at 14:00;  Stop 6/3/20 at 13:07;  Status DC


Potassium Chloride 90 meq/ Magnesium Sulfate 20 meq/ Multivitamins 10 

ml/Chromium/ Copper/Manganese/ Seleni/Zn 1 ml/ Insulin Human Regular 15 unit/ 

Total Parenteral Nutrition/Amino Acids/Dextrose/ Fat Emulsion Intravenous 1,800 

ml @  75 mls/hr TPN  CONT IV  Last administered on 5/19/20at 22:31;  Start 

5/19/20 at 22:00;  Stop 5/20/20 at 21:59;  Status DC


Potassium Chloride 90 meq/ Magnesium Sulfate 20 meq/ Multivitamins 10 

ml/Chromium/ Copper/Manganese/ Seleni/Zn 1 ml/ Insulin Human Regular 15 unit/ 

Total Parenteral Nutrition/Amino Acids/Dextrose/ Fat Emulsion Intravenous 1,800 

ml @  75 mls/hr TPN  CONT IV  Last administered on 5/20/20at 22:28;  Start 

5/20/20 at 22:00;  Stop 5/21/20 at 21:59;  Status DC


Potassium Chloride 110 meq/ Magnesium Sulfate 20 meq/ Multivitamins 10 

ml/Chromium/ Copper/Manganese/ Seleni/Zn 1 ml/ Insulin Human Regular 15 unit/ 

Total Parenteral Nutrition/Amino Acids/Dextrose/ Fat Emulsion Intravenous 1,800 

ml @  75 mls/hr TPN  CONT IV  Last administered on 5/21/20at 22:01;  Start 

5/21/20 at 22:00;  Stop 5/22/20 at 21:59;  Status DC


Saliva Substitute (Biotene Moisturizing Mouth) 2 spray PRN Q15MIN  PRN PO DRY 

MOUTH;  Start 5/21/20 at 11:00


Potassium Chloride 110 meq/ Magnesium Sulfate 20 meq/ Multivitamins 10 

ml/Chromium/ Copper/Manganese/ Seleni/Zn 1 ml/ Insulin Human Regular 15 unit/ 

Total Parenteral Nutrition/Amino Acids/Dextrose/ Fat Emulsion Intravenous 1,800 

ml @  75 mls/hr TPN  CONT IV  Last administered on 5/22/20at 22:21;  Start 

5/22/20 at 22:00;  Stop 5/23/20 at 21:59;  Status DC


Potassium Chloride 110 meq/ Magnesium Sulfate 20 meq/ Multivitamins 10 

ml/Chromium/ Copper/Manganese/ Seleni/Zn 1 ml/ Insulin Human Regular 15 unit/ 

Total Parenteral Nutrition/Amino Acids/Dextrose/ Fat Emulsion Intravenous 1,800 

ml @  75 mls/hr TPN  CONT IV  Last administered on 5/23/20at 22:04;  Start 

5/23/20 at 22:00;  Stop 5/24/20 at 21:59;  Status DC


Potassium Chloride 110 meq/ Magnesium Sulfate 20 meq/ Multivitamins 10 

ml/Chromium/ Copper/Manganese/ Seleni/Zn 1 ml/ Insulin Human Regular 15 unit/ 

Total Parenteral Nutrition/Amino Acids/Dextrose/ Fat Emulsion Intravenous 1,800 

ml @  75 mls/hr TPN  CONT IV  Last administered on 5/24/20at 22:48;  Start 

5/24/20 at 22:00;  Stop 5/25/20 at 21:59;  Status DC


Potassium Chloride 70 meq/ Magnesium Sulfate 20 meq/ Multivitamins 10 

ml/Chromium/ Copper/Manganese/ Seleni/Zn 1 ml/ Insulin Human Regular 15 unit/ 

Total Parenteral Nutrition/Amino Acids/Dextrose/ Fat Emulsion Intravenous 1,800 

ml @  75 mls/hr TPN  CONT IV  Last administered on 5/25/20at 21:39;  Start 

5/25/20 at 22:00;  Stop 5/26/20 at 21:59;  Status DC


Meropenem 500 mg/ Sodium Chloride 50 ml @  100 mls/hr Q6HRS IV  Last 

administered on 5/27/20at 06:02;  Start 5/25/20 at 18:00;  Stop 5/27/20 at 

09:59;  Status DC


Barium Sulfate (Varibar Thin Liquid Apple) 148 gm 1X  ONCE PO ;  Start 5/26/20 

at 11:45;  Stop 5/26/20 at 11:49;  Status DC


Potassium Chloride 70 meq/ Magnesium Sulfate 20 meq/ Multivitamins 10 

ml/Chromium/ Copper/Manganese/ Seleni/Zn 1 ml/ Insulin Human Regular 15 unit/ 

Total Parenteral Nutrition/Amino Acids/Dextrose/ Fat Emulsion Intravenous 1,800 

ml @  75 mls/hr TPN  CONT IV  Last administered on 5/26/20at 22:27;  Start 

5/26/20 at 22:00;  Stop 5/27/20 at 21:59;  Status DC


Piperacillin Sod/ Tazobactam Sod 3.375 gm/Sodium Chloride 50 ml @  100 mls/hr 

Q6HRS IV  Last administered on 6/4/20at 06:10;  Start 5/27/20 at 12:00;  Stop 

6/4/20 at 07:26;  Status DC


Potassium Chloride 70 meq/ Magnesium Sulfate 20 meq/ Multivitamins 10 

ml/Chromium/ Copper/Manganese/ Seleni/Zn 1 ml/ Insulin Human Regular 15 unit/ 

Total Parenteral Nutrition/Amino Acids/Dextrose/ Fat Emulsion Intravenous 1,800 

ml @  75 mls/hr TPN  CONT IV  Last administered on 5/27/20at 22:03;  Start 

5/27/20 at 22:00;  Stop 5/28/20 at 21:59;  Status DC


Potassium Chloride 70 meq/ Magnesium Sulfate 20 meq/ Multivitamins 10 

ml/Chromium/ Copper/Manganese/ Seleni/Zn 1 ml/ Insulin Human Regular 15 unit/ 

Total Parenteral Nutrition/Amino Acids/Dextrose/ Fat Emulsion Intravenous 1,800 

ml @  75 mls/hr TPN  CONT IV  Last administered on 5/28/20at 22:33;  Start 

5/28/20 at 22:00;  Stop 5/29/20 at 21:59;  Status DC


Potassium Chloride 70 meq/ Magnesium Sulfate 20 meq/ Multivitamins 10 

ml/Chromium/ Copper/Manganese/ Seleni/Zn 1 ml/ Insulin Human Regular 15 unit/ 

Total Parenteral Nutrition/Amino Acids/Dextrose/ Fat Emulsion Intravenous 1,800 

ml @  75 mls/hr TPN  CONT IV  Last administered on 5/29/20at 23:13;  Start 

5/29/20 at 22:00;  Stop 5/30/20 at 21:59;  Status DC


Potassium Chloride 80 meq/ Magnesium Sulfate 20 meq/ Multivitamins 10 

ml/Chromium/ Copper/Manganese/ Seleni/Zn 1 ml/ Insulin Human Regular 15 unit/ 

Total Parenteral Nutrition/Amino Acids/Dextrose/ Fat Emulsion Intravenous 1,800 

ml @  75 mls/hr TPN  CONT IV  Last administered on 5/30/20at 22:30;  Start 

5/30/20 at 22:00;  Stop 5/31/20 at 21:59;  Status DC


Potassium Chloride 80 meq/ Magnesium Sulfate 20 meq/ Multivitamins 10 

ml/Chromium/ Copper/Manganese/ Seleni/Zn 1 ml/ Insulin Human Regular 15 unit/ 

Total Parenteral Nutrition/Amino Acids/Dextrose/ Fat Emulsion Intravenous 1,800 

ml @  75 mls/hr TPN  CONT IV  Last administered on 5/31/20at 21:54;  Start 

5/31/20 at 22:00;  Stop 6/1/20 at 21:59;  Status DC


Potassium Chloride/Water 100 ml @  100 mls/hr 1X  ONCE IV  Last administered on 

6/1/20at 10:15;  Start 6/1/20 at 10:00;  Stop 6/1/20 at 10:59;  Status DC


Potassium Chloride 90 meq/ Magnesium Sulfate 20 meq/ Multivitamins 10 

ml/Chromium/ Copper/Manganese/ Seleni/Zn 1 ml/ Insulin Human Regular 20 unit/ 

Total Parenteral Nutrition/Amino Acids/Dextrose/ Fat Emulsion Intravenous 1,800 

ml @  75 mls/hr TPN  CONT IV  Last administered on 6/1/20at 22:28;  Start 6/1/20

at 22:00;  Stop 6/2/20 at 21:59;  Status DC


Potassium Chloride 90 meq/ Magnesium Sulfate 20 meq/ Multivitamins 10 

ml/Chromium/ Copper/Manganese/ Seleni/Zn 1 ml/ Insulin Human Regular 20 unit/ 

Total Parenteral Nutrition/Amino Acids/Dextrose/ Fat Emulsion Intravenous 1,800 

ml @  75 mls/hr TPN  CONT IV  Last administered on 6/2/20at 22:08;  Start 6/2/20

at 22:00;  Stop 6/3/20 at 21:59;  Status DC


Lorazepam (Ativan Inj) 0.25 mg PRN Q4HRS  PRN IVP ANXIETY / AGITATION Last 

administered on 6/27/20at 13:37;  Start 6/3/20 at 07:30


Potassium Chloride 90 meq/ Magnesium Sulfate 20 meq/ Multivitamins 10 

ml/Chromium/ Copper/Manganese/ Seleni/Zn 1 ml/ Insulin Human Regular 20 unit/ 

Total Parenteral Nutrition/Amino Acids/Dextrose/ Fat Emulsion Intravenous 1,800 

ml @  75 mls/hr TPN  CONT IV  Last administered on 6/3/20at 23:13;  Start 6/3/20

at 22:00;  Stop 6/4/20 at 21:59;  Status DC


Furosemide (Lasix) 40 mg DAILY IVP  Last administered on 6/5/20at 11:14;  Start 

6/3/20 at 13:30;  Stop 6/7/20 at 09:12;  Status DC


Fluoxetine HCl (PROzac) 20 mg QHS PEG  Last administered on 7/3/20at 20:49;  

Start 6/4/20 at 21:00


Fentanyl (Duragesic 50mcg/ Hr Patch) 1 patch Q72H TD  Last administered on 

6/4/20at 21:22;  Start 6/4/20 at 21:00;  Stop 6/13/20 at 12:00;  Status DC


Potassium Chloride 40 meq/ Potassium Acetate 60 meq/Magnesium Sulfate 10 meq/ 

Multivitamins 10 ml/Chromium/ Copper/Manganese/ Seleni/Zn 1 ml/ Insulin Human 

Regular 20 unit/ Total Parenteral Nutrition/Amino Acids/Dextrose/ Fat Emulsion 

Intravenous 1,800 ml @  75 mls/hr TPN  CONT IV  Last administered on 6/5/20at 

00:03;  Start 6/4/20 at 22:00;  Stop 6/5/20 at 21:59;  Status DC


Potassium Acetate 80 meq/Magnesium Sulfate 5 meq/ Multivitamins 10 ml/Chromium/ 

Copper/Manganese/ Seleni/Zn 1 ml/ Insulin Human Regular 20 unit/ Total 

Parenteral Nutrition/Amino Acids/Dextrose/ Fat Emulsion Intravenous 1,920 ml @  

80 mls/hr TPN  CONT IV  Last administered on 6/5/20at 21:59;  Start 6/5/20 at 

22:00;  Stop 6/6/20 at 21:59;  Status DC


Potassium Acetate 60 meq/Magnesium Sulfate 5 meq/ Multivitamins 10 ml/Chromium/ 

Copper/Manganese/ Seleni/Zn 1 ml/ Insulin Human Regular 30 unit/ Total 

Parenteral Nutrition/Amino Acids/Dextrose/ Fat Emulsion Intravenous 1,920 ml @  

80 mls/hr TPN  CONT IV  Last administered on 6/6/20at 21:54;  Start 6/6/20 at 

22:00;  Stop 6/7/20 at 21:59;  Status DC


Norepinephrine Bitartrate 8 mg/ Dextrose 258 ml @  13.332 mls/ hr CONT  PRN IV 

PER PROTOCOL Last administered on 7/2/20at 09:09;  Start 6/7/20 at 06:30


Albumin Human 500 ml @  125 mls/hr 1X  ONCE IV  Last administered on 6/7/20at 

08:10;  Start 6/7/20 at 08:15;  Stop 6/7/20 at 12:14;  Status DC


Potassium Acetate 40 meq/Magnesium Sulfate 5 meq/ Multivitamins 10 ml/Chromium/ 

Copper/Manganese/ Seleni/Zn 1 ml/ Insulin Human Regular 30 unit/ Total 

Parenteral Nutrition/Amino Acids/Dextrose/ Fat Emulsion Intravenous 1,920 ml @  

80 mls/hr TPN  CONT IV  Last administered on 6/7/20at 22:23;  Start 6/7/20 at 

22:00;  Stop 6/8/20 at 21:59;  Status DC


Meropenem 1 gm/ Sodium Chloride 100 ml @  200 mls/hr Q8HRS IV ;  Start 6/7/20 at

14:00;  Status Cancel


Meropenem 1 gm/ Sodium Chloride 100 ml @  200 mls/hr Q8HRS IV  Last administered

on 6/7/20at 11:04;  Start 6/7/20 at 10:00;  Stop 6/7/20 at 13:00;  Status DC


Meropenem 1 gm/ Sodium Chloride 100 ml @  200 mls/hr Q12HR IV  Last administered

on 6/25/20at 08:27;  Start 6/7/20 at 21:00;  Stop 6/25/20 at 08:56;  Status DC


Sodium Chloride 1,000 ml @  1,000 mls/hr 1X  ONCE IV  Last administered on 

6/7/20at 11:06;  Start 6/7/20 at 10:45;  Stop 6/7/20 at 11:44;  Status DC


Micafungin Sodium 100 mg/Dextrose 100 ml @  100 mls/hr Q24H IV  Last administe

red on 6/24/20at 12:34;  Start 6/7/20 at 11:00;  Stop 6/25/20 at 08:56;  Status 

DC


Daptomycin 410 mg/ Sodium Chloride 50 ml @  100 mls/hr Q24H IV  Last 

administered on 6/9/20at 13:33;  Start 6/7/20 at 14:00;  Stop 6/10/20 at 08:30; 

Status DC


Midazolam HCl (Versed) 2 mg STK-MED ONCE .ROUTE ;  Start 6/7/20 at 14:47;  Stop 

6/7/20 at 14:48;  Status DC


Fentanyl Citrate (Fentanyl 2ml Vial) 100 mcg STK-MED ONCE .ROUTE ;  Start 6/7/20

at 14:47;  Stop 6/7/20 at 14:48;  Status DC


Flumazenil (Romazicon) 0.5 mg STK-MED ONCE IV ;  Start 6/7/20 at 14:48;  Stop 

6/7/20 at 14:48;  Status DC


Naloxone HCl (Narcan) 0.4 mg STK-MED ONCE .ROUTE ;  Start 6/7/20 at 14:48;  Stop

6/7/20 at 14:48;  Status DC


Lidocaine HCl (Lidocaine 1% 20ml Vial) 20 ml STK-MED ONCE .ROUTE ;  Start 6/7/20

at 14:48;  Stop 6/7/20 at 14:48;  Status DC


Midazolam HCl (Versed) 2 mg 1X  ONCE IV  Last administered on 6/7/20at 15:28;  S

tart 6/7/20 at 15:00;  Stop 6/7/20 at 15:01;  Status DC


Fentanyl Citrate (Fentanyl 2ml Vial) 100 mcg 1X  ONCE IV  Last administered on 

6/7/20at 15:28;  Start 6/7/20 at 15:00;  Stop 6/7/20 at 15:01;  Status DC


Lidocaine HCl (Lidocaine 1% 20ml Vial) 20 ml 1X  ONCE INJ  Last administered on 

6/7/20at 15:30;  Start 6/7/20 at 15:00;  Stop 6/7/20 at 15:01;  Status DC


Sodium Chloride 1,000 ml @  100 mls/hr Q10H IV  Last administered on 6/16/20at 

07:30;  Start 6/7/20 at 20:00;  Stop 6/16/20 at 11:26;  Status DC


Sodium Bicarbonate (Sodium Bicarb Adult 8.4% Syr) 50 meq 1X  ONCE IV  Last 

administered on 6/7/20at 21:47;  Start 6/7/20 at 22:00;  Stop 6/7/20 at 22:01;  

Status DC


Potassium Acetate 40 meq/Magnesium Sulfate 5 meq/ Multivitamins 10 ml/Chromium/ 

Copper/Manganese/ Seleni/Zn 1 ml/ Insulin Human Regular 30 unit/ Total 

Parenteral Nutrition/Amino Acids/Dextrose/ Fat Emulsion Intravenous 1,920 ml @  

80 mls/hr TPN  CONT IV  Last administered on 6/8/20at 22:28;  Start 6/8/20 at 

22:00;  Stop 6/9/20 at 21:59;  Status DC


Sodium Chloride 500 ml @  500 mls/hr 1X  ONCE IV  Last administered on 6/9/20at 

06:39;  Start 6/9/20 at 06:45;  Stop 6/9/20 at 07:44;  Status DC


Potassium Acetate 40 meq/Magnesium Sulfate 5 meq/ Multivitamins 10 ml/Chromium/ 

Copper/Manganese/ Seleni/Zn 1 ml/ Insulin Human Regular 30 unit/ Total 

Parenteral Nutrition/Amino Acids/Dextrose/ Fat Emulsion Intravenous 1,920 ml @  

80 mls/hr TPN  CONT IV  Last administered on 6/9/20at 22:03;  Start 6/9/20 at 

22:00;  Stop 6/10/20 at 21:59;  Status DC


Metoprolol Tartrate (Lopressor Vial) 5 mg PRN Q6HRS  PRN IVP HYPERTENSION Last 

administered on 6/18/20at 10:14;  Start 6/10/20 at 09:00


Potassium Acetate 40 meq/Magnesium Sulfate 5 meq/ Multivitamins 10 ml/Chromium/ 

Copper/Manganese/ Seleni/Zn 1 ml/ Insulin Human Regular 30 unit/ Total 

Parenteral Nutrition/Amino Acids/Dextrose/ Fat Emulsion Intravenous 1,920 ml @  

80 mls/hr TPN  CONT IV  Last administered on 6/10/20at 21:26;  Start 6/10/20 at 

22:00;  Stop 6/11/20 at 21:59;  Status DC


Potassium Acetate 40 meq/Magnesium Sulfate 5 meq/ Multivitamins 10 ml/Chromium/ 

Copper/Manganese/ Seleni/Zn 1 ml/ Insulin Human Regular 30 unit/ Total 

Parenteral Nutrition/Amino Acids/Dextrose/ Fat Emulsion Intravenous 1,920 ml @  

80 mls/hr TPN  CONT IV  Last administered on 6/11/20at 23:23;  Start 6/11/20 at 

22:00;  Stop 6/12/20 at 21:59;  Status DC


Potassium Acetate 40 meq/Magnesium Sulfate 5 meq/ Multivitamins 10 ml/Chromium/ 

Copper/Manganese/ Seleni/Zn 1 ml/ Insulin Human Regular 30 unit/ Total 

Parenteral Nutrition/Amino Acids/Dextrose/ Fat Emulsion Intravenous 1,920 ml @  

80 mls/hr TPN  CONT IV  Last administered on 6/12/20at 21:35;  Start 6/12/20 at 

22:00;  Stop 6/13/20 at 21:59;  Status DC


Furosemide (Lasix) 20 mg 1X  ONCE IVP  Last administered on 6/13/20at 06:26;  

Start 6/13/20 at 06:15;  Stop 6/13/20 at 06:16;  Status DC


Methylprednisolone Sodium Succinate (SOLU-Medrol 125MG VIAL) 125 mg 1X  ONCE IV 

Last administered on 6/13/20at 06:26;  Start 6/13/20 at 06:15;  Stop 6/13/20 at 

06:16;  Status DC


Albuterol/ Ipratropium (Duoneb) 3 ml Q4HRS NEB  Last administered on 7/4/20at 

07:42;  Start 6/13/20 at 08:00


Fentanyl Citrate 30 ml @ 0 mls/hr CONT  PRN IV SEE PROTOCOL Last administered on

7/4/20at 08:03;  Start 6/13/20 at 06:00;  Stop 7/4/20 at 12:42;  Status DC


Propofol 100 ml @ 0 mls/hr CONT  PRN IV SEE PROTOCOL Last administered on 

6/20/20at 23:50;  Start 6/13/20 at 06:00


Fentanyl Citrate (Fentanyl 2ml Vial) 25 mcg PRN Q1HR  PRN IV SEE COMMENTS;  

Start 6/13/20 at 06:00


Fentanyl Citrate (Fentanyl 2ml Vial) 50 mcg PRN Q1HR  PRN IV SEE COMMENTS;  

Start 6/13/20 at 06:00


Chlorhexidine Gluconate (Peridex) 15 ml BID MM ;  Start 6/13/20 at 09:00;  Stop 

6/13/20 at 07:58;  Status DC


Potassium Acetate 40 meq/Magnesium Sulfate 5 meq/ Multivitamins 10 ml/Chromium/ 

Copper/Manganese/ Seleni/Zn 1 ml/ Insulin Human Regular 30 unit/ Total 

Parenteral Nutrition/Amino Acids/Dextrose/ Fat Emulsion Intravenous 1,920 ml @  

80 mls/hr TPN  CONT IV  Last administered on 6/13/20at 21:19;  Start 6/13/20 at 

22:00;  Stop 6/14/20 at 21:59;  Status DC


Acetylcysteine (Mucomyst 20% Resp Treatment) 600 mg BID NEB  Last administered 

on 6/19/20at 09:33;  Start 6/13/20 at 21:00;  Stop 6/19/20 at 10:39;  Status DC


Magnesium Sulfate 100 ml @  25 mls/hr 1X  ONCE IV  Last administered on 

6/13/20at 15:48;  Start 6/13/20 at 15:45;  Stop 6/13/20 at 19:44;  Status DC


Potassium Acetate 40 meq/Magnesium Sulfate 5 meq/ Multivitamins 10 ml/Chromium/ 

Copper/Manganese/ Seleni/Zn 1 ml/ Insulin Human Regular 30 unit/ Total 

Parenteral Nutrition/Amino Acids/Dextrose/ Fat Emulsion Intravenous 1,920 ml @  

80 mls/hr TPN  CONT IV  Last administered on 6/14/20at 21:35;  Start 6/14/20 at 

22:00;  Stop 6/15/20 at 21:59;  Status DC


Potassium Chloride/Water 100 ml @  100 mls/hr Q1H IV  Last administered on 

6/15/20at 08:31;  Start 6/15/20 at 07:00;  Stop 6/15/20 at 08:59;  Status DC


Potassium Acetate 40 meq/Magnesium Sulfate 5 meq/ Multivitamins 10 ml/Chromium/ 

Copper/Manganese/ Seleni/Zn 1 ml/ Insulin Human Regular 30 unit/ Total 

Parenteral Nutrition/Amino Acids/Dextrose/ Fat Emulsion Intravenous 1,920 ml @  

80 mls/hr TPN  CONT IV  Last administered on 6/15/20at 21:54;  Start 6/15/20 at 

22:00;  Stop 6/16/20 at 19:34;  Status DC


Lidocaine HCl (Buffered Lidocaine 1%) 3 ml STK-MED ONCE .ROUTE ;  Start 6/15/20 

at 12:14;  Stop 6/15/20 at 12:14;  Status DC


Lidocaine HCl (Buffered Lidocaine 1%) 3 ml 1X  ONCE IJ  Last administered on 

6/15/20at 13:11;  Start 6/15/20 at 13:00;  Stop 6/15/20 at 13:01;  Status DC


Magnesium Sulfate 50 ml @ 25 mls/hr 1X  ONCE IV ;  Start 6/16/20 at 08:15;  Stop

6/16/20 at 10:14;  Status DC


Potassium Acetate 40 meq/Magnesium Sulfate 10 meq/ Multivitamins 10 ml/Chromium/

Copper/Manganese/ Seleni/Zn 1 ml/ Insulin Human Regular 20 unit/ Total 

Parenteral Nutrition/Amino Acids/Dextrose/ Fat Emulsion Intravenous 1,920 ml @  

80 mls/hr TPN  CONT IV  Last administered on 6/16/20at 21:32;  Start 6/16/20 at 

22:00;  Stop 6/17/20 at 21:59;  Status DC


Potassium Chloride/Water 100 ml @  100 mls/hr Q1H IV  Last administered on 

6/17/20at 09:12;  Start 6/17/20 at 08:00;  Stop 6/17/20 at 09:59;  Status DC


Alteplase, Recombinant (Cathflo For Central Catheter Clearance) 4 mg 1X  ONCE 

INT CAT ;  Start 6/17/20 at 09:15;  Stop 6/17/20 at 09:16;  Status UNV


Alteplase, Recombinant (Cathflo For Central Catheter Clearance) 4 mg 1X  ONCE 

INT CAT ;  Start 6/17/20 at 09:15;  Stop 6/17/20 at 09:16;  Status UNV


Alteplase, Recombinant (Cathflo For Central Catheter Clearance) 4 mg 1X  ONCE 

INT CAT ;  Start 6/17/20 at 09:15;  Stop 6/17/20 at 09:16;  Status UNV


Alteplase, Recombinant 4 mg/ Sodium Chloride 20 ml @ 20 mls/hr 1X  ONCE IV  Last

administered on 6/17/20at 10:10;  Start 6/17/20 at 10:00;  Stop 6/17/20 at 

10:59;  Status DC


Alteplase, Recombinant 4 mg/ Sodium Chloride 20 ml @ 20 mls/hr 1X  ONCE IV  Last

administered on 6/17/20at 10:09;  Start 6/17/20 at 10:00;  Stop 6/17/20 at 10:5

9;  Status DC


Alteplase, Recombinant 4 mg/ Sodium Chloride 20 ml @ 20 mls/hr 1X  ONCE IV  Last

administered on 6/17/20at 10:09;  Start 6/17/20 at 10:00;  Stop 6/17/20 at 

10:59;  Status DC


Potassium Acetate 60 meq/Magnesium Sulfate 10 meq/ Multivitamins 10 ml/Chromium/

Copper/Manganese/ Seleni/Zn 1 ml/ Insulin Human Regular 20 unit/ Total 

Parenteral Nutrition/Amino Acids/Dextrose/ Fat Emulsion Intravenous 1,920 ml @  

80 mls/hr TPN  CONT IV  Last administered on 6/17/20at 21:55;  Start 6/17/20 at 

22:00;  Stop 6/18/20 at 21:59;  Status DC


Albumin Human 500 ml @  125 mls/hr 1X  ONCE IV  Last administered on 6/18/20at 

12:01;  Start 6/18/20 at 11:15;  Stop 6/18/20 at 15:14;  Status DC


Sodium Chloride 500 ml @  500 mls/hr 1X  ONCE IV  Last administered on 6/18/20at

13:50;  Start 6/18/20 at 11:15;  Stop 6/18/20 at 12:14;  Status DC


Potassium Acetate 60 meq/Magnesium Sulfate 14 meq/ Multivitamins 10 ml/Chromium/

Copper/Manganese/ Seleni/Zn 1 ml/ Insulin Human Regular 20 unit/ Total 

Parenteral Nutrition/Amino Acids/Dextrose/ Fat Emulsion Intravenous 1,920 ml @  

80 mls/hr TPN  CONT IV  Last administered on 6/18/20at 22:26;  Start 6/18/20 at 

22:00;  Stop 6/19/20 at 21:59;  Status DC


Ciprofloxacin/ Dextrose 200 ml @  200 mls/hr Q12HR IV  Last administered on 

6/25/20at 08:27;  Start 6/18/20 at 21:00;  Stop 6/25/20 at 08:56;  Status DC


Albumin Human 250 ml @  62.5 mls/hr 1X  ONCE IV  Last administered on 6/19/20at 

11:09;  Start 6/19/20 at 11:00;  Stop 6/19/20 at 14:59;  Status DC


Furosemide (Lasix) 20 mg 1X  ONCE IVP  Last administered on 6/19/20at 14:52;  

Start 6/19/20 at 10:45;  Stop 6/19/20 at 10:49;  Status DC


Potassium Acetate 60 meq/Magnesium Sulfate 14 meq/ Multivitamins 10 ml/Chromium/

Copper/Manganese/ Seleni/Zn 1 ml/ Insulin Human Regular 15 unit/ Total 

Parenteral Nutrition/Amino Acids/Dextrose/ Fat Emulsion Intravenous 1,920 ml @  

80 mls/hr TPN  CONT IV  Last administered on 6/19/20at 22:08;  Start 6/19/20 at 

22:00;  Stop 6/20/20 at 21:59;  Status DC


Potassium Acetate 60 meq/Magnesium Sulfate 14 meq/ Multivitamins 10 ml/Chromium/

Copper/Manganese/ Seleni/Zn 1 ml/ Insulin Human Regular 15 unit/ Total 

Parenteral Nutrition/Amino Acids/Dextrose/ Fat Emulsion Intravenous 1,920 ml @  

80 mls/hr TPN  CONT IV  Last administered on 6/20/20at 22:12;  Start 6/20/20 at 

22:00;  Stop 6/21/20 at 21:59;  Status DC


Potassium Acetate 60 meq/Magnesium Sulfate 14 meq/ Multivitamins 10 ml/Chromium/

Copper/Manganese/ Seleni/Zn 1 ml/ Insulin Human Regular 15 unit/ Total 

Parenteral Nutrition/Amino Acids/Dextrose/ Fat Emulsion Intravenous 1,920 ml @  

80 mls/hr TPN  CONT IV  Last administered on 6/21/20at 22:22;  Start 6/21/20 at 

22:00;  Stop 6/22/20 at 21:59;  Status DC


Furosemide (Lasix) 20 mg 1X  ONCE IVP  Last administered on 6/22/20at 11:07;  

Start 6/22/20 at 10:30;  Stop 6/22/20 at 10:34;  Status DC


Potassium Acetate 60 meq/Magnesium Sulfate 14 meq/ Multivitamins 10 ml/Chromium/

Copper/Manganese/ Seleni/Zn 1 ml/ Insulin Human Regular 15 unit/ Sodium Chloride

20 meq/Total Parenteral Nutrition/Amino Acids/Dextrose/ Fat Emulsion Intravenous

1,920 ml @  80 mls/hr TPN  CONT IV  Last administered on 6/22/20at 21:54;  Start

6/22/20 at 22:00;  Stop 6/23/20 at 21:59;  Status DC


Potassium Acetate 30 meq/Magnesium Sulfate 14 meq/ Multivitamins 10 ml/Chromium/

Copper/Manganese/ Seleni/Zn 1 ml/ Insulin Human Regular 15 unit/ Sodium Chloride

20 meq/Potassium Chloride 30 meq/ Total Parenteral Nutrition/Amino 

Acids/Dextrose/ Fat Emulsion Intravenous 1,920 ml @  80 mls/hr TPN  CONT IV  

Last administered on 6/23/20at 21:46;  Start 6/23/20 at 22:00;  Stop 6/24/20 at 

21:59;  Status DC


Sodium Chloride 80 meq/Potassium Chloride 30 meq/ Potassium Acetate 30 

meq/Magnesium Sulfate 14 meq/ Multivitamins 10 ml/Chromium/ Copper/Manganese/ 

Seleni/Zn 1 ml/ Insulin Human Regular 15 unit/ Total Parenteral Nutrition/Amino 

Acids/Dextrose/ Fat Emulsion Intravenous 1,920 ml @  80 mls/hr TPN  CONT IV  

Last administered on 6/24/20at 22:33;  Start 6/24/20 at 22:00;  Stop 6/25/20 at 

21:59;  Status DC


Furosemide (Lasix) 40 mg 1X  ONCE IVP  Last administered on 6/24/20at 16:27;  

Start 6/24/20 at 15:30;  Stop 6/24/20 at 15:33;  Status DC


Albumin Human 250 ml @  62.5 mls/hr 1X  ONCE IV  Last administered on 6/24/20at 

16:27;  Start 6/24/20 at 15:30;  Stop 6/24/20 at 19:29;  Status DC


Sodium Chloride 80 meq/Potassium Chloride 30 meq/ Potassium Acetate 30 

meq/Magnesium Sulfate 14 meq/ Multivitamins 10 ml/Chromium/ Copper/Manganese/ 

Seleni/Zn 1 ml/ Insulin Human Regular 15 unit/ Total Parenteral Nutrition/Amino 

Acids/Dextrose/ Fat Emulsion Intravenous 1,920 ml @  80 mls/hr TPN  CONT IV  

Last administered on 6/25/20at 22:25;  Start 6/25/20 at 22:00;  Stop 6/26/20 at 

21:59;  Status DC


Sodium Chloride 80 meq/Potassium Chloride 30 meq/ Potassium Acetate 30 

meq/Magnesium Sulfate 14 meq/ Multivitamins 10 ml/Chromium/ Copper/Manganese/ 

Seleni/Zn 1 ml/ Insulin Human Regular 15 unit/ Total Parenteral Nutrition/Amino 

Acids/Dextrose/ Fat Emulsion Intravenous 1,920 ml @  80 mls/hr TPN  CONT IV  

Last administered on 6/26/20at 21:32;  Start 6/26/20 at 22:00;  Stop 6/27/20 at 

21:59;  Status DC


Sodium Chloride 80 meq/Potassium Chloride 30 meq/ Potassium Acetate 30 

meq/Magnesium Sulfate 14 meq/ Multivitamins 10 ml/Chromium/ Copper/Manganese/ 

Seleni/Zn 1 ml/ Insulin Human Regular 15 unit/ Total Parenteral Nutrition/Amino 

Acids/Dextrose/ Fat Emulsion Intravenous 1,920 ml @  80 mls/hr TPN  CONT IV  

Last administered on 6/27/20at 21:53;  Start 6/27/20 at 22:00;  Stop 6/28/20 at 

21:59;  Status DC


Acetylcysteine (Mucomyst 20% Resp Treatment) 600 mg RTBID NEB  Last administered

on 7/4/20at 07:42;  Start 6/27/20 at 12:00


Sodium Chloride 80 meq/Potassium Chloride 30 meq/ Potassium Acetate 30 

meq/Magnesium Sulfate 14 meq/ Multivitamins 10 ml/Chromium/ Copper/Manganese/ 

Seleni/Zn 1 ml/ Insulin Human Regular 15 unit/ Total Parenteral Nutrition/Amino 

Acids/Dextrose/ Fat Emulsion Intravenous 1,920 ml @  80 mls/hr TPN  CONT IV  

Last administered on 6/28/20at 22:06;  Start 6/28/20 at 22:00;  Stop 6/29/20 at 

21:59;  Status DC


Meropenem 500 mg/ Sodium Chloride 50 ml @  100 mls/hr Q6HRS IV  Last 

administered on 7/4/20at 12:01;  Start 6/28/20 at 18:00


Daptomycin 500 mg/ Sodium Chloride 50 ml @  100 mls/hr Q24H IV  Last 

administered on 7/3/20at 20:01;  Start 6/28/20 at 19:00


Sodium Chloride 80 meq/Potassium Chloride 30 meq/ Potassium Acetate 30 

meq/Magnesium Sulfate 14 meq/ Multivitamins 10 ml/Chromium/ Copper/Manganese/ 

Seleni/Zn 1 ml/ Insulin Human Regular 15 unit/ Total Parenteral Nutrition/Amino 

Acids/Dextrose/ Fat Emulsion Intravenous 1,920 ml @  80 mls/hr TPN  CONT IV  

Last administered on 6/29/20at 22:09;  Start 6/29/20 at 22:00;  Stop 6/30/20 at 

21:59;  Status DC


Heparin Sodium (Porcine) 1000 unit/Sodium Chloride 1,001 ml @  1,001 mls/hr 1X  

ONCE IRR ;  Start 6/30/20 at 06:00;  Stop 6/30/20 at 06:59;  Status DC


Propofol (Diprivan) 200 mg STK-MED ONCE IV ;  Start 6/30/20 at 07:44;  Stop 

6/30/20 at 07:44;  Status DC


Lidocaine HCl (Lidocaine Pf 2% Vial) 5 ml STK-MED ONCE .ROUTE ;  Start 6/30/20 

at 07:44;  Stop 6/30/20 at 07:44;  Status DC


Fentanyl Citrate (Fentanyl 2ml Vial) 100 mcg STK-MED ONCE .ROUTE ;  Start 

6/30/20 at 07:44;  Stop 6/30/20 at 07:44;  Status DC


Rocuronium Bromide (Zemuron) 100 mg STK-MED ONCE .ROUTE ;  Start 6/30/20 at 

07:44;  Stop 6/30/20 at 07:44;  Status DC


Micafungin Sodium 100 mg/Dextrose 100 ml @  100 mls/hr Q24H IV  Last 

administered on 7/4/20at 07:51;  Start 6/30/20 at 08:30


Bupivacaine HCl/ Epinephrine Bitart (Sensorcain-Epi 0.5%-1:[id number] Mpf) 30 ml 

STK-MED ONCE .ROUTE ;  Start 6/30/20 at 08:34;  Stop 6/30/20 at 08:35;  Status 

DC


Iohexol (Omnipaque 300 Mg/ml) 50 ml STK-MED ONCE .ROUTE  Last administered on 

6/30/20at 13:30;  Start 6/30/20 at 08:35;  Stop 6/30/20 at 08:35;  Status DC


Sodium Chloride 80 meq/Potassium Chloride 30 meq/ Potassium Acetate 30 

meq/Magnesium Sulfate 14 meq/ Multivitamins 10 ml/Chromium/ Copper/Manganese/ 

Seleni/Zn 1 ml/ Insulin Human Regular 15 unit/ Total Parenteral Nutrition/Amino 

Acids/Dextrose/ Fat Emulsion Intravenous 1,920 ml @  80 mls/hr TPN  CONT IV  

Last administered on 7/1/20at 01:22;  Start 6/30/20 at 22:00;  Stop 7/1/20 at 

21:59;  Status DC


Phenylephrine HCl (Ken-Synephrine Inj) 10 mg STK-MED ONCE .ROUTE ;  Start 

6/30/20 at 10:15;  Stop 6/30/20 at 10:15;  Status DC


Desflurane (Suprane) 90 ml STK-MED ONCE IH ;  Start 6/30/20 at 10:18;  Stop 

6/30/20 at 10:19;  Status DC


Albumin Human 500 ml @  As Directed STK-MED ONCE IV ;  Start 6/30/20 at 11:06;  

Stop 6/30/20 at 11:06;  Status DC


Vasopressin (Vasostrict) 20 unit STK-MED ONCE .ROUTE ;  Start 6/30/20 at 12:23; 

Stop 6/30/20 at 12:23;  Status DC


Phenylephrine HCl (Ken-Synephrine Inj) 10 mg STK-MED ONCE .ROUTE ;  Start 

6/30/20 at 13:33;  Stop 6/30/20 at 13:33;  Status DC


Phenylephrine HCl (Ken-Synephrine Inj) 10 mg STK-MED ONCE .ROUTE ;  Start 

6/30/20 at 13:33;  Stop 6/30/20 at 13:33;  Status DC


Ondansetron HCl (Zofran) 4 mg STK-MED ONCE .ROUTE ;  Start 6/30/20 at 13:33;  

Stop 6/30/20 at 13:33;  Status DC


Enoxaparin Sodium (Lovenox 40mg Syringe) 40 mg Q24H SQ  Last administered on 

7/4/20at 09:35;  Start 7/1/20 at 08:00


Sodium Chloride (Normal Saline Flush) 3 ml QSHIFT  PRN IV AFTER MEDS AND BLOOD 

DRAWS;  Start 6/30/20 at 14:45


Naloxone HCl (Narcan) 0.4 mg PRN Q2MIN  PRN IV SEE INSTRUCTIONS;  Start 6/30/20 

at 14:45


Sodium Chloride 1,000 ml @  25 mls/hr Q24H IV  Last administered on 7/4/20at 

12:37;  Start 6/30/20 at 14:33


Morphine Sulfate (Morphine Sulfate) 1 mg PRN Q1HR  PRN IV PAIN;  Start 6/30/20 

at 14:45


Midazolam HCl 100 mg/Sodium Chloride 100 ml @ 1 mls/hr CONT  PRN IV SEE I/O 

RECORD Last administered on 7/3/20at 18:48;  Start 6/30/20 at 14:45


Phenylephrine HCl (PHENYLEPHRINE in 0.9% NACL PF) 1 mg STK-MED ONCE IV ;  Start 

6/30/20 at 14:44;  Stop 6/30/20 at 14:45;  Status DC


Ephedrine Sulfate (ePHEDrine PF IN SALINE SYRINGE) 50 mg STK-MED ONCE IV ;  

Start 6/30/20 at 14:45;  Stop 6/30/20 at 14:45;  Status DC


Vasopressin 20 unit/Dextrose 101 ml @  12 mls/hr CONT  PRN IV SEE I/O RECORD La

st administered on 7/4/20at 02:03;  Start 6/30/20 at 15:30


Sodium Chloride 1,000 ml @  1,000 mls/hr 1X  ONCE IV  Last administered on 

6/30/20at 15:42;  Start 6/30/20 at 15:45;  Stop 6/30/20 at 16:44;  Status DC


Albumin Human 500 ml @  125 mls/hr 1X  ONCE IV ;  Start 6/30/20 at 16:00;  Stop 

6/30/20 at 19:59;  Status DC


Albumin Human 500 ml @  125 mls/hr PRN Q1HR  PRN IV PER PROTOCOL;  Start 6/30/20

at 15:45


Magnesium Sulfate 50 ml @ 25 mls/hr 1X  ONCE IV  Last administered on 6/30/20at 

17:02;  Start 6/30/20 at 16:30;  Stop 6/30/20 at 18:29;  Status DC


Sodium Bicarbonate (Sodium Bicarb Adult 8.4% Syr) 50 meq STK-MED ONCE .ROUTE ;  

Start 6/30/20 at 16:20;  Stop 6/30/20 at 16:20;  Status DC


Sodium Bicarbonate (Sodium Bicarb Adult 8.4% Syr) 100 meq 1X  ONCE IV  Last 

administered on 6/30/20at 17:07;  Start 6/30/20 at 16:30;  Stop 6/30/20 at 

16:31;  Status DC


Sodium Bicarbonate 150 meq/Dextrose 1,150 ml @  75 mls/hr 1X  ONCE IV  Last 

administered on 6/30/20at 20:02;  Start 6/30/20 at 16:30;  Stop 7/1/20 at 07:49;

 Status DC


Sodium Chloride 80 meq/Potassium Chloride 30 meq/ Potassium Acetate 30 

meq/Magnesium Sulfate 14 meq/ Multivitamins 10 ml/Chromium/ Copper/Manganese/ 

Seleni/Zn 1 ml/ Insulin Human Regular 15 unit/ Total Parenteral Nutrition/Amino 

Acids/Dextrose/ Fat Emulsion Intravenous 1,920 ml @  80 mls/hr TPN  CONT IV  

Last administered on 7/1/20at 23:05;  Start 7/1/20 at 22:00;  Stop 7/2/20 at 

21:59;  Status DC


Sodium Chloride 100 meq/Potassium Chloride 30 meq/ Potassium Acetate 30 

meq/Magnesium Sulfate 12 meq/ Multivitamins 10 ml/Chromium/ Copper/Manganese/ 

Seleni/Zn 1 ml/ Insulin Human Regular 15 unit/ Total Parenteral Nutrition/Amino 

Acids/Dextrose/ Fat Emulsion Intravenous 1,920 ml @  80 mls/hr TPN  CONT IV  

Last administered on 7/2/20at 21:52;  Start 7/2/20 at 22:00;  Stop 7/3/20 at 

21:59;  Status DC


Sodium Chloride 100 meq/Potassium Chloride 30 meq/ Potassium Acetate 30 

meq/Magnesium Sulfate 12 meq/ Multivitamins 10 ml/Chromium/ Copper/Manganese/ 

Seleni/Zn 1 ml/ Insulin Human Regular 15 unit/ Total Parenteral Nutrition/Amino 

Acids/Dextrose/ Fat Emulsion Intravenous 1,920 ml @  80 mls/hr TPN  CONT IV  

Last administered on 7/3/20at 21:46;  Start 7/3/20 at 22:00;  Stop 7/4/20 at 

21:59


Sodium Chloride 100 meq/Potassium Chloride 30 meq/ Potassium Acetate 30 meq/Ma

gnesium Sulfate 12 meq/ Multivitamins 10 ml/Chromium/ Copper/Manganese/ 

Seleni/Zn 1 ml/ Insulin Human Regular 15 unit/ Total Parenteral Nutrition/Amino 

Acids/Dextrose/ Fat Emulsion Intravenous 1,800 ml @  75 mls/hr TPN  CONT IV ;  

Start 7/4/20 at 22:00;  Stop 7/5/20 at 21:59


Fentanyl Citrate 55 ml @ 0 mls/hr CONT  PRN IV SEE COMMENTS;  Start 7/4/20 at 

13:00





Active Scripts


Active


Reported


Bisoprolol Fumarate 5 Mg Tablet 10 Mg PO DAILY


Vitals/I & O





Vital Sign - Last 24 Hours








 7/3/20 7/3/20 7/3/20 7/3/20





 13:42 13:55 14:00 14:30


 


Pulse   78 


 


Resp   12 


 


B/P (MAP)   114/68 (83) 


 


Pulse Ox 99 99 98 98


 


O2 Delivery Ventilator Ventilator Ventilator Ventilator





 7/3/20 7/3/20 7/3/20 7/3/20





 15:00 15:38 15:52 16:00


 


Temp    98.2





    98.2


 


Pulse 86   96


 


Resp 14   12


 


B/P (MAP) 105/57 (73)   100/57 (71)


 


Pulse Ox 98 98  99


 


O2 Delivery Ventilator Ventilator Mechanical Ventilator Ventilator


 


    





    





 7/3/20 7/3/20 7/3/20 7/3/20





 17:00 17:40 18:00 19:00


 


Pulse 90  91 93


 


Resp 14  15 15


 


B/P (MAP) 98/56 (70)  110/65 (80) 105/60 (75)


 


Pulse Ox 97 95 98 98


 


O2 Delivery Ventilator Ventilator Ventilator Ventilator





 7/3/20 7/3/20 7/3/20 7/3/20





 19:48 20:00 20:00 21:00


 


Pulse  92  90


 


Resp  15  15


 


B/P (MAP)  105/60 (75)  121/72 (88)


 


Pulse Ox 98 98  98


 


O2 Delivery Ventilator Ventilator Mechanical Ventilator Ventilator





 7/3/20 7/3/20 7/4/20 7/4/20





 22:00 23:00 00:00 00:00


 


Temp   98.7 





   98.7 


 


Pulse 93 92 93 


 


Resp 15 15 15 


 


B/P (MAP) 109/62 (78) 92/52 (65) 96/57 (70) 


 


Pulse Ox 98 98 98 


 


O2 Delivery Ventilator Ventilator Ventilator Mechanical Ventilator


 


    





    





 7/4/20 7/4/20 7/4/20 7/4/20





 00:37 01:00 02:00 03:00


 


Pulse  82 84 85


 


Resp  15 15 15


 


B/P (MAP)  96/56 (69) 94/64 (74) 98/67 (77)


 


Pulse Ox 98 98 98 98


 


O2 Delivery Ventilator Ventilator Ventilator Ventilator





 7/4/20 7/4/20 7/4/20 7/4/20





 04:00 04:00 05:00 06:00


 


Temp  98.9  





  98.9  


 


Pulse  94 90 81


 


Resp  15 15 15


 


B/P (MAP)  106/57 (73) 106/56 (73) 93/48 (63)


 


Pulse Ox  98 98 98


 


O2 Delivery Mechanical Ventilator Ventilator Ventilator Ventilator


 


    





    





 7/4/20 7/4/20 7/4/20 7/4/20





 07:00 07:33 08:00 08:00


 


Temp    98.6





    98.6


 


Pulse 78   89


 


Resp 14   19


 


B/P (MAP) 99/57 (71)   100/57 (71)


 


Pulse Ox 99 100  100


 


O2 Delivery Ventilator Ventilator Mechanical Ventilator Ventilator


 


    





    





 7/4/20 7/4/20 7/4/20 7/4/20





 08:03 08:30 08:43 09:00


 


Pulse    90


 


Resp    17


 


B/P (MAP)    107/56 (73)


 


Pulse Ox 99 99 100 99


 


O2 Delivery Ventilator Ventilator Ventilator Ventilator





 7/4/20 7/4/20 7/4/20 7/4/20





 09:15 09:30 09:45 10:00


 


Pulse 85 84 81 81


 


Resp    16


 


B/P (MAP) 110/59 (76) 107/56 (73) 103/56 (72) 100/59 (73)


 


Pulse Ox    100


 


O2 Delivery    Ventilator





 7/4/20 7/4/20 7/4/20 7/4/20





 10:15 10:30 10:45 11:00


 


Pulse 87 85 86 86


 


B/P (MAP) 99/54 (69) 100/60 (73) 98/56 (70) 97/53 (68)





 7/4/20 7/4/20 7/4/20 7/4/20





 11:15 11:40 11:51 11:57


 


Temp    98.6





    98.6


 


Pulse 93 93  90


 


Resp    18


 


B/P (MAP) 102/56 (71) 93/47 (62)  92/51 (65)


 


Pulse Ox    99


 


O2 Delivery   Mechanical Ventilator Ventilator


 


    





    





 7/4/20 7/4/20 7/4/20 7/4/20





 12:00 12:15 12:19 12:30


 


Pulse  86  86


 


B/P (MAP) 87/47 (60) 90/51 (64)  101/57 (72)


 


Pulse Ox   99 


 


O2 Delivery   Ventilator 














Intake and Output   


 


 7/3/20 7/3/20 7/4/20





 15:00 23:00 07:00


 


Intake Total 100 ml 1699.25 ml 1150.0 ml


 


Output Total 626 ml 1195 ml 530 ml


 


Balance -526 ml 504.25 ml 620.0 ml











Justicifation of Admission Dx:


Justifications for Admission:


Justification of Admission Dx:  Yes











MG ARGUETA MD                  Jul 4, 2020 13:21

## 2020-07-05 VITALS — SYSTOLIC BLOOD PRESSURE: 95 MMHG | DIASTOLIC BLOOD PRESSURE: 54 MMHG

## 2020-07-05 VITALS — SYSTOLIC BLOOD PRESSURE: 102 MMHG | DIASTOLIC BLOOD PRESSURE: 52 MMHG

## 2020-07-05 VITALS — DIASTOLIC BLOOD PRESSURE: 58 MMHG | SYSTOLIC BLOOD PRESSURE: 95 MMHG

## 2020-07-05 VITALS — DIASTOLIC BLOOD PRESSURE: 48 MMHG | SYSTOLIC BLOOD PRESSURE: 81 MMHG

## 2020-07-05 VITALS — SYSTOLIC BLOOD PRESSURE: 107 MMHG | DIASTOLIC BLOOD PRESSURE: 68 MMHG

## 2020-07-05 VITALS — DIASTOLIC BLOOD PRESSURE: 48 MMHG | SYSTOLIC BLOOD PRESSURE: 96 MMHG

## 2020-07-05 VITALS — SYSTOLIC BLOOD PRESSURE: 97 MMHG | DIASTOLIC BLOOD PRESSURE: 49 MMHG

## 2020-07-05 VITALS — SYSTOLIC BLOOD PRESSURE: 101 MMHG | DIASTOLIC BLOOD PRESSURE: 53 MMHG

## 2020-07-05 VITALS — DIASTOLIC BLOOD PRESSURE: 61 MMHG | SYSTOLIC BLOOD PRESSURE: 97 MMHG

## 2020-07-05 VITALS — SYSTOLIC BLOOD PRESSURE: 95 MMHG | DIASTOLIC BLOOD PRESSURE: 53 MMHG

## 2020-07-05 VITALS — SYSTOLIC BLOOD PRESSURE: 103 MMHG | DIASTOLIC BLOOD PRESSURE: 60 MMHG

## 2020-07-05 VITALS — DIASTOLIC BLOOD PRESSURE: 51 MMHG | SYSTOLIC BLOOD PRESSURE: 95 MMHG

## 2020-07-05 VITALS — SYSTOLIC BLOOD PRESSURE: 105 MMHG | DIASTOLIC BLOOD PRESSURE: 59 MMHG

## 2020-07-05 VITALS — DIASTOLIC BLOOD PRESSURE: 51 MMHG | SYSTOLIC BLOOD PRESSURE: 101 MMHG

## 2020-07-05 VITALS — DIASTOLIC BLOOD PRESSURE: 55 MMHG | SYSTOLIC BLOOD PRESSURE: 102 MMHG

## 2020-07-05 VITALS — SYSTOLIC BLOOD PRESSURE: 97 MMHG | DIASTOLIC BLOOD PRESSURE: 48 MMHG

## 2020-07-05 VITALS — SYSTOLIC BLOOD PRESSURE: 117 MMHG | DIASTOLIC BLOOD PRESSURE: 74 MMHG

## 2020-07-05 VITALS — DIASTOLIC BLOOD PRESSURE: 49 MMHG | SYSTOLIC BLOOD PRESSURE: 91 MMHG

## 2020-07-05 VITALS — DIASTOLIC BLOOD PRESSURE: 48 MMHG | SYSTOLIC BLOOD PRESSURE: 93 MMHG

## 2020-07-05 VITALS — DIASTOLIC BLOOD PRESSURE: 58 MMHG | SYSTOLIC BLOOD PRESSURE: 93 MMHG

## 2020-07-05 VITALS — DIASTOLIC BLOOD PRESSURE: 74 MMHG | SYSTOLIC BLOOD PRESSURE: 97 MMHG

## 2020-07-05 VITALS — DIASTOLIC BLOOD PRESSURE: 49 MMHG | SYSTOLIC BLOOD PRESSURE: 90 MMHG

## 2020-07-05 VITALS — SYSTOLIC BLOOD PRESSURE: 104 MMHG | DIASTOLIC BLOOD PRESSURE: 60 MMHG

## 2020-07-05 VITALS — SYSTOLIC BLOOD PRESSURE: 100 MMHG | DIASTOLIC BLOOD PRESSURE: 52 MMHG

## 2020-07-05 VITALS — SYSTOLIC BLOOD PRESSURE: 90 MMHG | DIASTOLIC BLOOD PRESSURE: 49 MMHG

## 2020-07-05 LAB
ANION GAP SERPL CALC-SCNC: 3 MMOL/L (ref 6–14)
BUN SERPL-MCNC: 21 MG/DL (ref 7–20)
CALCIUM SERPL-MCNC: 8.8 MG/DL (ref 8.5–10.1)
CHLORIDE SERPL-SCNC: 102 MMOL/L (ref 98–107)
CO2 SERPL-SCNC: 29 MMOL/L (ref 21–32)
CREAT SERPL-MCNC: 0.6 MG/DL (ref 0.6–1)
GFR SERPLBLD BASED ON 1.73 SQ M-ARVRAT: 106.3 ML/MIN
GLUCOSE SERPL-MCNC: 126 MG/DL (ref 70–99)
POTASSIUM SERPL-SCNC: 4.7 MMOL/L (ref 3.5–5.1)
SODIUM SERPL-SCNC: 134 MMOL/L (ref 136–145)

## 2020-07-05 RX ADMIN — ENOXAPARIN SODIUM SCH MG: 40 INJECTION SUBCUTANEOUS at 08:21

## 2020-07-05 RX ADMIN — MEROPENEM SCH MLS/HR: 500 INJECTION, POWDER, FOR SOLUTION INTRAVENOUS at 12:48

## 2020-07-05 RX ADMIN — ACETYLCYSTEINE SCH MG: 200 INHALANT RESPIRATORY (INHALATION) at 08:45

## 2020-07-05 RX ADMIN — IPRATROPIUM BROMIDE AND ALBUTEROL SULFATE SCH ML: .5; 3 SOLUTION RESPIRATORY (INHALATION) at 15:13

## 2020-07-05 RX ADMIN — ACETYLCYSTEINE SCH MG: 200 INHALANT RESPIRATORY (INHALATION) at 20:00

## 2020-07-05 RX ADMIN — IPRATROPIUM BROMIDE AND ALBUTEROL SULFATE SCH ML: .5; 3 SOLUTION RESPIRATORY (INHALATION) at 04:00

## 2020-07-05 RX ADMIN — MEROPENEM SCH MLS/HR: 500 INJECTION, POWDER, FOR SOLUTION INTRAVENOUS at 05:59

## 2020-07-05 RX ADMIN — INSULIN LISPRO SCH UNITS: 100 INJECTION, SOLUTION INTRAVENOUS; SUBCUTANEOUS at 12:50

## 2020-07-05 RX ADMIN — MEROPENEM SCH MLS/HR: 500 INJECTION, POWDER, FOR SOLUTION INTRAVENOUS at 00:22

## 2020-07-05 RX ADMIN — INSULIN LISPRO SCH UNITS: 100 INJECTION, SOLUTION INTRAVENOUS; SUBCUTANEOUS at 23:54

## 2020-07-05 RX ADMIN — DEXTROSE PRN MLS/HR: 50 INJECTION, SOLUTION INTRAVENOUS at 21:23

## 2020-07-05 RX ADMIN — DEXTROSE PRN MLS/HR: 50 INJECTION, SOLUTION INTRAVENOUS at 08:14

## 2020-07-05 RX ADMIN — PANTOPRAZOLE SODIUM SCH MG: 40 INJECTION, POWDER, FOR SOLUTION INTRAVENOUS at 08:19

## 2020-07-05 RX ADMIN — DAPTOMYCIN SCH MLS/HR: 500 INJECTION, POWDER, LYOPHILIZED, FOR SOLUTION INTRAVENOUS at 19:21

## 2020-07-05 RX ADMIN — INSULIN LISPRO SCH UNITS: 100 INJECTION, SOLUTION INTRAVENOUS; SUBCUTANEOUS at 18:00

## 2020-07-05 RX ADMIN — DEXTROSE SCH MLS/HR: 50 INJECTION, SOLUTION INTRAVENOUS at 08:22

## 2020-07-05 RX ADMIN — IPRATROPIUM BROMIDE AND ALBUTEROL SULFATE SCH ML: .5; 3 SOLUTION RESPIRATORY (INHALATION) at 08:45

## 2020-07-05 RX ADMIN — Medication PRN EACH: at 09:19

## 2020-07-05 RX ADMIN — IPRATROPIUM BROMIDE AND ALBUTEROL SULFATE SCH ML: .5; 3 SOLUTION RESPIRATORY (INHALATION) at 23:52

## 2020-07-05 RX ADMIN — INSULIN LISPRO SCH UNITS: 100 INJECTION, SOLUTION INTRAVENOUS; SUBCUTANEOUS at 05:59

## 2020-07-05 RX ADMIN — INSULIN LISPRO SCH UNITS: 100 INJECTION, SOLUTION INTRAVENOUS; SUBCUTANEOUS at 00:00

## 2020-07-05 RX ADMIN — MEROPENEM SCH MLS/HR: 500 INJECTION, POWDER, FOR SOLUTION INTRAVENOUS at 18:01

## 2020-07-05 RX ADMIN — IPRATROPIUM BROMIDE AND ALBUTEROL SULFATE SCH ML: .5; 3 SOLUTION RESPIRATORY (INHALATION) at 20:00

## 2020-07-05 RX ADMIN — IPRATROPIUM BROMIDE AND ALBUTEROL SULFATE SCH ML: .5; 3 SOLUTION RESPIRATORY (INHALATION) at 11:27

## 2020-07-05 RX ADMIN — Medication PRN MLS/HR: at 17:13

## 2020-07-05 RX ADMIN — DEXTROSE PRN MLS/HR: 50 INJECTION, SOLUTION INTRAVENOUS at 14:31

## 2020-07-05 RX ADMIN — MEROPENEM SCH MLS/HR: 500 INJECTION, POWDER, FOR SOLUTION INTRAVENOUS at 23:54

## 2020-07-05 RX ADMIN — IPRATROPIUM BROMIDE AND ALBUTEROL SULFATE SCH ML: .5; 3 SOLUTION RESPIRATORY (INHALATION) at 11:25

## 2020-07-05 NOTE — NUR
Pharmacy TPN Dosing Note



S: JESENIA BEAN is a 49 year old F Currently receiving Central Continuous TPN started 
03/18/20



B:Pertinent PMH: Necrotizing pancreatitis

Current diet: NPO 



LABS:

Sodium:    134 

Potassium: 4.7 

Chloride:  102 

Calcium:   8.8 

Corrected Calcium: 11.28 

Magnesium: 2.1 

CO2:       29 

SCr:       0.6 

Glucose:   126 

Albumin:   0.9 

AST:       28 

ALT:       68 



TPN FORMULA:

TPN TYPE:  Central Continuous

AMINO ACIDS:         80 gm

DEXTROSE:            250 gm

LIPIDS:              20 gm

SODIUM CHLORIDE:     100 mEq

SODIUM ACETATE:      - mEq

SODIUM PHOSPHATE:    - mmol

POTASSIUM CHLORIDE:  30 mEq

POTASSIUM ACETATE:   30 mEq

POTASSIUM PHOSPHATE: - mmol

MAGNESIUM:           12 mEq

CALCIUM:             - mEq

INSULIN:             15 units

MULTIPLE VITAMIN:    10 ml

TRACE ELEMENTS:      1 ml ml(s)



TPN PLAN:  7/5 further decrease rate 2'hyponatremia, REPEAT LABS IN AM





R: Continue TPN 

Will monitor electrolytes, glucose, and tolerance to TPN.



 MURPHY ACOSTA [facility], 07/05/20 0989

## 2020-07-05 NOTE — PDOC
SURGICAL PROGRESS NOTE


Subjective


Sedated and ventilated


Vital Signs





Vital Signs








  Date Time  Temp Pulse Resp B/P (MAP) Pulse Ox O2 Delivery O2 Flow Rate FiO2


 


7/5/20 08:00      Mechanical Ventilator  


 


7/5/20 07:00  94 14 96/48 (64) 100   


 


7/5/20 04:00 98.7       





 98.7       








I&O











Intake and Output 


 


 7/5/20





 07:00


 


Intake Total 3424.2 ml


 


Output Total 2742 ml


 


Balance 682.2 ml


 


 


 


Intake Oral 0 ml


 


IV Total 2558.2 ml


 


Tube Feeding 716 ml


 


Other 150 ml


 


Output Urine Total 1317 ml


 


Gastric Drainage Total 200 ml


 


Chest Tube Drainage Total 130 ml


 


Drainage Total 1095 ml








PATIENT HAS A VILLASENOR:  Yes


General:  Other (Sedated)


Abdomen:  Normal bowel sounds, Soft, Other (Wounds clean dry and intact with the

wound VAC.  Pleur-evac drains with minimal output ROBERT drain with serous output 

minimal NG tube output)


Labs





Laboratory Tests








Test


 7/3/20


10:16 7/3/20


12:26 7/3/20


18:06 7/3/20


23:56


 


Sodium Level


 133 mmol/L


(136-145) 


 


 





 


Potassium Level


 3.9 mmol/L


(3.5-5.1) 


 


 





 


Chloride Level


 102 mmol/L


() 


 


 





 


Carbon Dioxide Level


 28 mmol/L


(21-32) 


 


 





 


Anion Gap 3 (6-14)    


 


Blood Urea Nitrogen


 20 mg/dL


(7-20) 


 


 





 


Creatinine


 0.6 mg/dL


(0.6-1.0) 


 


 





 


Estimated GFR


(Cockcroft-Gault) 106.3 


 


 


 





 


BUN/Creatinine Ratio 33 (6-20)    


 


Glucose Level


 224 mg/dL


(70-99) 


 


 





 


Calcium Level


 8.6 mg/dL


(8.5-10.1) 


 


 





 


Total Bilirubin


 0.6 mg/dL


(0.2-1.0) 


 


 





 


Aspartate Amino Transf


(AST/SGOT) 42 U/L (15-37) 


 


 


 





 


Alanine Aminotransferase


(ALT/SGPT) 98 U/L (14-59) 


 


 


 





 


Alkaline Phosphatase


 199 U/L


() 


 


 





 


Total Protein


 3.7 g/dL


(6.4-8.2) 


 


 





 


Albumin


 1.0 g/dL


(3.4-5.0) 


 


 





 


Albumin/Globulin Ratio 0.4 (1.0-1.7)    


 


Glucose (Fingerstick)


 


 179 mg/dL


(70-99) 143 mg/dL


(70-99) 136 mg/dL


(70-99)


 


Test


 7/4/20


06:12 7/4/20


06:15 7/4/20


11:50 7/4/20


18:09


 


Glucose (Fingerstick)


 141 mg/dL


(70-99) 


 149 mg/dL


(70-99) 127 mg/dL


(70-99)


 


White Blood Count


 


 25.9 x10^3/uL


(4.0-11.0) 


 





 


Red Blood Count


 


 2.52 x10^6/uL


(3.50-5.40) 


 





 


Hemoglobin


 


 7.4 g/dL


(12.0-15.5) 


 





 


Hematocrit


 


 22.3 %


(36.0-47.0) 


 





 


Mean Corpuscular Volume  88 fL ()   


 


Mean Corpuscular Hemoglobin  30 pg (25-35)   


 


Mean Corpuscular Hemoglobin


Concent 


 33 g/dL


(31-37) 


 





 


Red Cell Distribution Width


 


 15.1 %


(11.5-14.5) 


 





 


Platelet Count


 


 269 x10^3/uL


(140-400) 


 





 


Neutrophils (%) (Auto)  89 % (31-73)   


 


Lymphocytes (%) (Auto)  5 % (24-48)   


 


Monocytes (%) (Auto)  5 % (0-9)   


 


Eosinophils (%) (Auto)  1 % (0-3)   


 


Basophils (%) (Auto)  0 % (0-3)   


 


Neutrophils # (Auto)


 


 23.0 x10^3/uL


(1.8-7.7) 


 





 


Lymphocytes # (Auto)


 


 1.3 x10^3/uL


(1.0-4.8) 


 





 


Monocytes # (Auto)


 


 1.2 x10^3/uL


(0.0-1.1) 


 





 


Eosinophils # (Auto)


 


 0.2 x10^3/uL


(0.0-0.7) 


 





 


Basophils # (Auto)


 


 0.1 x10^3/uL


(0.0-0.2) 


 





 


Sodium Level


 


 132 mmol/L


(136-145) 


 





 


Potassium Level


 


 4.2 mmol/L


(3.5-5.1) 


 





 


Chloride Level


 


 101 mmol/L


() 


 





 


Carbon Dioxide Level


 


 29 mmol/L


(21-32) 


 





 


Anion Gap  2 (6-14)   


 


Blood Urea Nitrogen


 


 20 mg/dL


(7-20) 


 





 


Creatinine


 


 0.5 mg/dL


(0.6-1.0) 


 





 


Estimated GFR


(Cockcroft-Gault) 


 131.1 


 


 





 


BUN/Creatinine Ratio  40 (6-20)   


 


Glucose Level


 


 130 mg/dL


(70-99) 


 





 


Calcium Level


 


 8.6 mg/dL


(8.5-10.1) 


 





 


Total Bilirubin


 


 0.6 mg/dL


(0.2-1.0) 


 





 


Aspartate Amino Transf


(AST/SGOT) 


 28 U/L (15-37) 


 


 





 


Alanine Aminotransferase


(ALT/SGPT) 


 68 U/L (14-59) 


 


 





 


Alkaline Phosphatase


 


 210 U/L


() 


 





 


Total Protein


 


 3.2 g/dL


(6.4-8.2) 


 





 


Albumin


 


 0.9 g/dL


(3.4-5.0) 


 





 


Albumin/Globulin Ratio  0.4 (1.0-1.7)   


 


Test


 7/5/20


00:20 7/5/20


05:57 7/5/20


06:00 





 


Glucose (Fingerstick)


 126 mg/dL


(70-99) 120 mg/dL


(70-99) 


 





 


Sodium Level


 


 


 134 mmol/L


(136-145) 





 


Potassium Level


 


 


 4.7 mmol/L


(3.5-5.1) 





 


Chloride Level


 


 


 102 mmol/L


() 





 


Carbon Dioxide Level


 


 


 29 mmol/L


(21-32) 





 


Anion Gap   3 (6-14)  


 


Blood Urea Nitrogen


 


 


 21 mg/dL


(7-20) 





 


Creatinine


 


 


 0.6 mg/dL


(0.6-1.0) 





 


Estimated GFR


(Cockcroft-Gault) 


 


 106.3 


 





 


Glucose Level


 


 


 126 mg/dL


(70-99) 





 


Calcium Level


 


 


 8.8 mg/dL


(8.5-10.1) 











Laboratory Tests








Test


 7/4/20


11:50 7/4/20


18:09 7/5/20


00:20 7/5/20


05:57


 


Glucose (Fingerstick)


 149 mg/dL


(70-99) 127 mg/dL


(70-99) 126 mg/dL


(70-99) 120 mg/dL


(70-99)


 


Test


 7/5/20


06:00 


 


 





 


Sodium Level


 134 mmol/L


(136-145) 


 


 





 


Potassium Level


 4.7 mmol/L


(3.5-5.1) 


 


 





 


Chloride Level


 102 mmol/L


() 


 


 





 


Carbon Dioxide Level


 29 mmol/L


(21-32) 


 


 





 


Anion Gap 3 (6-14)    


 


Blood Urea Nitrogen


 21 mg/dL


(7-20) 


 


 





 


Creatinine


 0.6 mg/dL


(0.6-1.0) 


 


 





 


Estimated GFR


(Cockcroft-Gault) 106.3 


 


 


 





 


Glucose Level


 126 mg/dL


(70-99) 


 


 





 


Calcium Level


 8.8 mg/dL


(8.5-10.1) 


 


 











Problem List


Problems


Medical Problems:


(1) Acute pancreatitis


Status: Acute  





(2) Cholelithiasis


Status: Acute  








Assessment/Plan


Status post debridement of pancreas


Off pressors at this point blood pressure running 80s we will restart 

vasopressin at low-dose


Should be able to increase tube feeds tomorrow per Dr. Olivera


Supportive care





Justicifation of Admission Dx:


Justifications for Admission:


Justification of Admission Dx:  Yes











OVIDIO MEDINA MD              Jul 5, 2020 08:19

## 2020-07-05 NOTE — PDOC
PULMONARY PROGRESS NOTES


Subjective


Remains A/C mode and sedated, Fi02 40% and PEEP of 5 


continues with TPN/TF and vasopressin 


S/P Exploratory laparotomy, lysis of adhesions, subtotal cholecystectomy with 

cholangiogram, gastrojejunostomy tube placement, pancreatic necrosectomy on 6/30


no overnight concerns from nursing 





Vitals





Vital Signs








  Date Time  Temp Pulse Resp B/P (MAP) Pulse Ox O2 Delivery O2 Flow Rate FiO2


 


7/5/20 08:50     98 Ventilator  


 


7/5/20 07:00  94 14 96/48 (64)    


 


7/5/20 04:00 98.7       





 98.7       








Comments


trach/sedated on vent


Lungs:  Crackles


Cardiovascular:  S1, S2


Abdomen:  Soft, Non-tender, Other (multiple ROBERT drains )


Extremities:  Other (+1 BLE edema)


Skin:  Warm


Labs





Laboratory Tests








Test


 7/3/20


10:16 7/3/20


12:26 7/3/20


18:06 7/3/20


23:56


 


Sodium Level


 133 mmol/L


(136-145) 


 


 





 


Potassium Level


 3.9 mmol/L


(3.5-5.1) 


 


 





 


Chloride Level


 102 mmol/L


() 


 


 





 


Carbon Dioxide Level


 28 mmol/L


(21-32) 


 


 





 


Anion Gap 3 (6-14)    


 


Blood Urea Nitrogen


 20 mg/dL


(7-20) 


 


 





 


Creatinine


 0.6 mg/dL


(0.6-1.0) 


 


 





 


Estimated GFR


(Cockcroft-Gault) 106.3 


 


 


 





 


BUN/Creatinine Ratio 33 (6-20)    


 


Glucose Level


 224 mg/dL


(70-99) 


 


 





 


Calcium Level


 8.6 mg/dL


(8.5-10.1) 


 


 





 


Total Bilirubin


 0.6 mg/dL


(0.2-1.0) 


 


 





 


Aspartate Amino Transf


(AST/SGOT) 42 U/L (15-37) 


 


 


 





 


Alanine Aminotransferase


(ALT/SGPT) 98 U/L (14-59) 


 


 


 





 


Alkaline Phosphatase


 199 U/L


() 


 


 





 


Total Protein


 3.7 g/dL


(6.4-8.2) 


 


 





 


Albumin


 1.0 g/dL


(3.4-5.0) 


 


 





 


Albumin/Globulin Ratio 0.4 (1.0-1.7)    


 


Glucose (Fingerstick)


 


 179 mg/dL


(70-99) 143 mg/dL


(70-99) 136 mg/dL


(70-99)


 


Test


 7/4/20


06:12 7/4/20


06:15 7/4/20


11:50 7/4/20


18:09


 


Glucose (Fingerstick)


 141 mg/dL


(70-99) 


 149 mg/dL


(70-99) 127 mg/dL


(70-99)


 


White Blood Count


 


 25.9 x10^3/uL


(4.0-11.0) 


 





 


Red Blood Count


 


 2.52 x10^6/uL


(3.50-5.40) 


 





 


Hemoglobin


 


 7.4 g/dL


(12.0-15.5) 


 





 


Hematocrit


 


 22.3 %


(36.0-47.0) 


 





 


Mean Corpuscular Volume  88 fL ()   


 


Mean Corpuscular Hemoglobin  30 pg (25-35)   


 


Mean Corpuscular Hemoglobin


Concent 


 33 g/dL


(31-37) 


 





 


Red Cell Distribution Width


 


 15.1 %


(11.5-14.5) 


 





 


Platelet Count


 


 269 x10^3/uL


(140-400) 


 





 


Neutrophils (%) (Auto)  89 % (31-73)   


 


Lymphocytes (%) (Auto)  5 % (24-48)   


 


Monocytes (%) (Auto)  5 % (0-9)   


 


Eosinophils (%) (Auto)  1 % (0-3)   


 


Basophils (%) (Auto)  0 % (0-3)   


 


Neutrophils # (Auto)


 


 23.0 x10^3/uL


(1.8-7.7) 


 





 


Lymphocytes # (Auto)


 


 1.3 x10^3/uL


(1.0-4.8) 


 





 


Monocytes # (Auto)


 


 1.2 x10^3/uL


(0.0-1.1) 


 





 


Eosinophils # (Auto)


 


 0.2 x10^3/uL


(0.0-0.7) 


 





 


Basophils # (Auto)


 


 0.1 x10^3/uL


(0.0-0.2) 


 





 


Sodium Level


 


 132 mmol/L


(136-145) 


 





 


Potassium Level


 


 4.2 mmol/L


(3.5-5.1) 


 





 


Chloride Level


 


 101 mmol/L


() 


 





 


Carbon Dioxide Level


 


 29 mmol/L


(21-32) 


 





 


Anion Gap  2 (6-14)   


 


Blood Urea Nitrogen


 


 20 mg/dL


(7-20) 


 





 


Creatinine


 


 0.5 mg/dL


(0.6-1.0) 


 





 


Estimated GFR


(Cockcroft-Gault) 


 131.1 


 


 





 


BUN/Creatinine Ratio  40 (6-20)   


 


Glucose Level


 


 130 mg/dL


(70-99) 


 





 


Calcium Level


 


 8.6 mg/dL


(8.5-10.1) 


 





 


Total Bilirubin


 


 0.6 mg/dL


(0.2-1.0) 


 





 


Aspartate Amino Transf


(AST/SGOT) 


 28 U/L (15-37) 


 


 





 


Alanine Aminotransferase


(ALT/SGPT) 


 68 U/L (14-59) 


 


 





 


Alkaline Phosphatase


 


 210 U/L


() 


 





 


Total Protein


 


 3.2 g/dL


(6.4-8.2) 


 





 


Albumin


 


 0.9 g/dL


(3.4-5.0) 


 





 


Albumin/Globulin Ratio  0.4 (1.0-1.7)   


 


Test


 7/5/20


00:20 7/5/20


05:57 7/5/20


06:00 





 


Glucose (Fingerstick)


 126 mg/dL


(70-99) 120 mg/dL


(70-99) 


 





 


Sodium Level


 


 


 134 mmol/L


(136-145) 





 


Potassium Level


 


 


 4.7 mmol/L


(3.5-5.1) 





 


Chloride Level


 


 


 102 mmol/L


() 





 


Carbon Dioxide Level


 


 


 29 mmol/L


(21-32) 





 


Anion Gap   3 (6-14)  


 


Blood Urea Nitrogen


 


 


 21 mg/dL


(7-20) 





 


Creatinine


 


 


 0.6 mg/dL


(0.6-1.0) 





 


Estimated GFR


(Cockcroft-Gault) 


 


 106.3 


 





 


Glucose Level


 


 


 126 mg/dL


(70-99) 





 


Calcium Level


 


 


 8.8 mg/dL


(8.5-10.1) 











Laboratory Tests








Test


 7/4/20


11:50 7/4/20


18:09 7/5/20


00:20 7/5/20


05:57


 


Glucose (Fingerstick)


 149 mg/dL


(70-99) 127 mg/dL


(70-99) 126 mg/dL


(70-99) 120 mg/dL


(70-99)


 


Test


 7/5/20


06:00 


 


 





 


Sodium Level


 134 mmol/L


(136-145) 


 


 





 


Potassium Level


 4.7 mmol/L


(3.5-5.1) 


 


 





 


Chloride Level


 102 mmol/L


() 


 


 





 


Carbon Dioxide Level


 29 mmol/L


(21-32) 


 


 





 


Anion Gap 3 (6-14)    


 


Blood Urea Nitrogen


 21 mg/dL


(7-20) 


 


 





 


Creatinine


 0.6 mg/dL


(0.6-1.0) 


 


 





 


Estimated GFR


(Cockcroft-Gault) 106.3 


 


 


 





 


Glucose Level


 126 mg/dL


(70-99) 


 


 





 


Calcium Level


 8.8 mg/dL


(8.5-10.1) 


 


 











Medications





Active Scripts








 Medications  Dose


 Route/Sig


 Max Daily Dose Days Date Category


 


 Bisoprolol


 Fumarate 5 Mg


 Tablet  10 Mg


 PO DAILY


   3/16/20 Reported








Comments


 CXR 6/28/20


IMPRESSION:


No significant interval change compared to 6/25/2020.


 











ct abdomen /pelvis 6/6


1. Removal of the percutaneous pigtail drainage catheters since the prior 


exam. Sequela of pancreatitis with extensive pseudocysts again 


demonstrated, the right-sided collections are slightly larger since the 


prior exam, the left-sided collections are stable. See above.


2. Moderate to large left pleural effusion with atelectasis and collapse 


of most of the left lower lobe, stable. Small right pleural effusion is 


stable.


3. Gallstone.





ct chest 6/15 reviewed








 GRAM NEG COCCOBACILLI:MANY


        SQUAMOUS EPI CELL:RARE


        PMN (WBCs):[id number]


        Unless otherwise specified, Testing Performed by:


        [address] [id number]


        For Inquires, the Physician may contact the Microbiology


        department at [phone]





  RESPIRATORY CULTURE  Final  


        Final





        MANY GRAM NEGATIVE RODS on 06/15/20 at 110


        FINAL ID= [PSEUDOMONAS AERUGINOSA]


        MICRO CHARGES


        PSEUDOMONAS AERUGINOSA





  ANTIMICROBIAL SUSCEPTIBILITY  Final  


        Comment





        NEG ANSON 56


        PSEUDOMONAS AERUGINOSA


        ANTIBIOTIC                        RESULT          INTERPRETATION


        AMIKACIN                          <=16                  S


        AZTREONAM                         <=4                   S


        CEFTAZIDIME                       <=1                   S


        CIPROFLOXACIN                     <=0.25                S


        CEFEPIME                          <=2                   S


        CEFTAZIDIME/AVIBACTAM             <=4                   S


        GENTAMICIN                        <=2                   S


        LEVOFLOXACIN                      <=0.5                 S





Impression


.





IMPRESSION:


1.  Acute hypoxemic respiratory failure secondary to ARDS status post trach, 

developed anemia 6/7, blood drainage from RLQ abdomen drain site, and 

surrounding firmness  / developed septic shock 6/7 from abdomen source, required

levo 6/7


s/p 3 new drains 6/7 with brown color drainage,  


2.  Gallstone pancreatitis, now with ongoing bleeding from prior drain. Anemic. 

s/p Tx multiple units over several days


3.  septic shock/sepsis, recurrent 6/7, source abdomen. ,


4.  Acute kidney injury-, Off HD--renal function decling. suspect JED on CKD due

to hypotension , improved now


5.  Acute gallstone pancreatitis.


6.  Hypoalbuminemia.


7.  Moderate persistent effusions, s/p left thora  5/12, reaccumulation of left 

effusion. O2 requirement not changed. 


8.  Fever- ,hypotension. suspect recurrent sepsis/ likely pancreatic source.  

Per ID, per surgery--


9.  Chronic anemia-- ongoing / s/p PRBC


10. Covid 19 testing negative


11. Moderate to large ascites-S/P paracentisis


12.S/P paracentisis with 4 liters removed on 4/15/20


13. S/P IR drain placement on 5/8/2020, removal, re inserted 6/7


14. Depression/Anxiety 


15. Increase effusion, ? loculated/ s/p chest tube.. drainage slowing down. 





6/30


S/P Exploratory laparotomy, lysis of adhesions, subtotal cholecystectomy with 

cholangiogram, gastrojejunostomy tube placement, pancreatic necrosectomy


leukocytosis- improving





Plan


.


Case discussed with NP, and RN.  See below


Continue current vent support in A/C mode, follow ABG and CXR-- Plan for CPAP 

early this week 


S/P Exploratory laparotomy, lysis of adhesions, subtotal cholecystectomy with 

cholangiogram, gastrojejunostomy tube placement, pancreatic necrosectomy with 

multiple drains in place-- on 6/30 


ABX per ID 


Follow surgery recs


Continue TF and TPN for nutrition support 


Vasopressors for hypotension to keep MAP above 65 


DVT/GI PPX


D/W RN and RT 








CC time 30 minutes











STEVE MIRANDA MD               Jul 5, 2020 09:15

## 2020-07-05 NOTE — PDOC
PROGRESS NOTES


Chief Complaint


Chief Complaint


 


Acute hypoxic Respiratory failure required  mechanical ventilation


Tracheostomy


bilateral pleural effusions/pulm edema s/p Throacentesis on 6/15/2020


Severe Acute gallstone pancreatitis (not a surgical candidate at this time) with

necrosis


Acute kidney failure now requiring dialysis


Gallstones (Calculus of gallbladder with acute cholecystitis without 

obstruction)


HTN 


Intractable pain


Intractable nausea


Covid 19 negative. 


Acute on chronic anemia 


EEG: No seizure activityFever  - better currently - intermittent could be from 

underlying pancreatitis blood cults 5/4 - neg so far


? Ileus with vomiting


Abd distention - U/S and CT reviewed s/p 0.4 L of opaque, debris-containing 

ascites was removed 5/6


Acute pancreatitis with persistent necrosis


Gallstone pancreatitis with necrosis. 


   -CT A/P 6/6 showed multiple pseudocysts, slight larger on the right. s/p 

drains x 3, 6/7.  + PSAE (MDRO-R Cefepime, Zosyn ANSON < 64) and yeast, 


   -s/p drain 4/27. C. parapsilosis. s/p drain 5/6 + yeast & high amylase; s/p 

additional drain on 5/8. Drains removed. 


Ascites s/p paracentesis 4/15 & 5/6. C. parapsilosis 


JED. off HD. 


A large fluid collection in the pancreatic bed has slightly decreased in size, 

described below, the pancreas itself is difficult to  visualize, which could be 

due to necrosis or obscuration of pancreatic  parenchyma from the surrounding 

fluid collection.6/15 


- 4/27 status post ROBERT drain placement + C paropsilosis. s/p additional drains 

5/8


Anemia - S/p PRBCs


Cholelithiasis with thickening of the gallbladder wall.


Leucocytosis improving


JED, hyperkalemia, Metabolic acidosis off dialysis


hypocalcemia 


Prediabetes


HTN


s/p trach


ESRD on HD


Hyperglycemia


severe protein-caloric malnutrition


Moderate to large left pleural effusion with atelectasis and collapse  of most 

of the left lower lobe, stable


 


Dispo - ICU, critically ill


Poor prognosis





History of Present Illness


History of Present Illness


7/5


cont current


prognosis poor,





Vitals


Vitals





Vital Signs








  Date Time  Temp Pulse Resp B/P (MAP) Pulse Ox O2 Delivery O2 Flow Rate FiO2


 


7/5/20 15:13     100 Ventilator  


 


7/5/20 14:00  88 12 102/55 (71)    


 


7/5/20 12:00 98.2       





 98.2       











Physical Exam


Physical Exam


GENERAL: Sedated, ill appearing


HEENT: Pupils equal, oral cavity dry. + NGT


NECK:  Tracheostomy 


LUNGS: Diminished aeration bases,  CT on left 


HEART:  S1, S2, regular w/ PVCs 


ABDOMEN: Distended,  bowel sounds hypoactive, soft, richardson x 2, 3 ROBERT drains, G-J

tube and + wound vac 


: [place] in place 


EXTREMITIES: Generalized edema, no cyanosis. SCDs & Podus boots bilaterally  


SKIN: warm touch. No signs of rash.  


LUE-PICC without signs of complications 


LUE art-line out, mottling left forearm, some better. RP palpable, cap refill 

brisk.


General:  Other (Sedated)


Heart:  Regular rate (SR/ST), Other (distant heart sounds)


Lungs:  Crackles


Abdomen:  Normal bowel sounds, Soft, Other (Wounds clean dry and intact with the

wound VAC.  Pleur-evac drains with minimal output ROBERT drain with serous output 

minimal NG tube output)


Extremities:  Other (Diffuse edema)


Skin:  No rashes, No significant lesion





Labs


LABS





Laboratory Tests








Test


 7/4/20


18:09 7/5/20


00:20 7/5/20


05:57 7/5/20


06:00


 


Glucose (Fingerstick)


 127 mg/dL


(70-99) 126 mg/dL


(70-99) 120 mg/dL


(70-99) 





 


Sodium Level


 


 


 


 134 mmol/L


(136-145)


 


Potassium Level


 


 


 


 4.7 mmol/L


(3.5-5.1)


 


Chloride Level


 


 


 


 102 mmol/L


()


 


Carbon Dioxide Level


 


 


 


 29 mmol/L


(21-32)


 


Anion Gap    3 (6-14) 


 


Blood Urea Nitrogen


 


 


 


 21 mg/dL


(7-20)


 


Creatinine


 


 


 


 0.6 mg/dL


(0.6-1.0)


 


Estimated GFR


(Cockcroft-Gault) 


 


 


 106.3 





 


Glucose Level


 


 


 


 126 mg/dL


(70-99)


 


Calcium Level


 


 


 


 8.8 mg/dL


(8.5-10.1)


 


Test


 7/5/20


12:49 


 


 





 


Glucose (Fingerstick)


 169 mg/dL


(70-99) 


 


 














Assessment and Plan


Assessmemt and Plan


Problems


Medical Problems:


(1) Acute pancreatitis


Status: Acute  





(2) Cholelithiasis


Status: Acute  











Comment


Review of Relevant


I have reviewed the following items josy (where applicable) has been applied.


Labs





Laboratory Tests








Test


 7/3/20


18:06 7/3/20


23:56 7/4/20


06:12 7/4/20


06:15


 


Glucose (Fingerstick)


 143 mg/dL


(70-99) 136 mg/dL


(70-99) 141 mg/dL


(70-99) 





 


White Blood Count


 


 


 


 25.9 x10^3/uL


(4.0-11.0)


 


Red Blood Count


 


 


 


 2.52 x10^6/uL


(3.50-5.40)


 


Hemoglobin


 


 


 


 7.4 g/dL


(12.0-15.5)


 


Hematocrit


 


 


 


 22.3 %


(36.0-47.0)


 


Mean Corpuscular Volume    88 fL () 


 


Mean Corpuscular Hemoglobin    30 pg (25-35) 


 


Mean Corpuscular Hemoglobin


Concent 


 


 


 33 g/dL


(31-37)


 


Red Cell Distribution Width


 


 


 


 15.1 %


(11.5-14.5)


 


Platelet Count


 


 


 


 269 x10^3/uL


(140-400)


 


Neutrophils (%) (Auto)    89 % (31-73) 


 


Lymphocytes (%) (Auto)    5 % (24-48) 


 


Monocytes (%) (Auto)    5 % (0-9) 


 


Eosinophils (%) (Auto)    1 % (0-3) 


 


Basophils (%) (Auto)    0 % (0-3) 


 


Neutrophils # (Auto)


 


 


 


 23.0 x10^3/uL


(1.8-7.7)


 


Lymphocytes # (Auto)


 


 


 


 1.3 x10^3/uL


(1.0-4.8)


 


Monocytes # (Auto)


 


 


 


 1.2 x10^3/uL


(0.0-1.1)


 


Eosinophils # (Auto)


 


 


 


 0.2 x10^3/uL


(0.0-0.7)


 


Basophils # (Auto)


 


 


 


 0.1 x10^3/uL


(0.0-0.2)


 


Sodium Level


 


 


 


 132 mmol/L


(136-145)


 


Potassium Level


 


 


 


 4.2 mmol/L


(3.5-5.1)


 


Chloride Level


 


 


 


 101 mmol/L


()


 


Carbon Dioxide Level


 


 


 


 29 mmol/L


(21-32)


 


Anion Gap    2 (6-14) 


 


Blood Urea Nitrogen


 


 


 


 20 mg/dL


(7-20)


 


Creatinine


 


 


 


 0.5 mg/dL


(0.6-1.0)


 


Estimated GFR


(Cockcroft-Gault) 


 


 


 131.1 





 


BUN/Creatinine Ratio    40 (6-20) 


 


Glucose Level


 


 


 


 130 mg/dL


(70-99)


 


Calcium Level


 


 


 


 8.6 mg/dL


(8.5-10.1)


 


Total Bilirubin


 


 


 


 0.6 mg/dL


(0.2-1.0)


 


Aspartate Amino Transf


(AST/SGOT) 


 


 


 28 U/L (15-37) 





 


Alanine Aminotransferase


(ALT/SGPT) 


 


 


 68 U/L (14-59) 





 


Alkaline Phosphatase


 


 


 


 210 U/L


()


 


Total Protein


 


 


 


 3.2 g/dL


(6.4-8.2)


 


Albumin


 


 


 


 0.9 g/dL


(3.4-5.0)


 


Albumin/Globulin Ratio    0.4 (1.0-1.7) 


 


Test


 7/4/20


11:50 7/4/20


18:09 7/5/20


00:20 7/5/20


05:57


 


Glucose (Fingerstick)


 149 mg/dL


(70-99) 127 mg/dL


(70-99) 126 mg/dL


(70-99) 120 mg/dL


(70-99)


 


Test


 7/5/20


06:00 7/5/20


12:49 


 





 


Sodium Level


 134 mmol/L


(136-145) 


 


 





 


Potassium Level


 4.7 mmol/L


(3.5-5.1) 


 


 





 


Chloride Level


 102 mmol/L


() 


 


 





 


Carbon Dioxide Level


 29 mmol/L


(21-32) 


 


 





 


Anion Gap 3 (6-14)    


 


Blood Urea Nitrogen


 21 mg/dL


(7-20) 


 


 





 


Creatinine


 0.6 mg/dL


(0.6-1.0) 


 


 





 


Estimated GFR


(Cockcroft-Gault) 106.3 


 


 


 





 


Glucose Level


 126 mg/dL


(70-99) 


 


 





 


Calcium Level


 8.8 mg/dL


(8.5-10.1) 


 


 





 


Glucose (Fingerstick)


 


 169 mg/dL


(70-99) 


 











Laboratory Tests








Test


 7/4/20


18:09 7/5/20


00:20 7/5/20


05:57 7/5/20


06:00


 


Glucose (Fingerstick)


 127 mg/dL


(70-99) 126 mg/dL


(70-99) 120 mg/dL


(70-99) 





 


Sodium Level


 


 


 


 134 mmol/L


(136-145)


 


Potassium Level


 


 


 


 4.7 mmol/L


(3.5-5.1)


 


Chloride Level


 


 


 


 102 mmol/L


()


 


Carbon Dioxide Level


 


 


 


 29 mmol/L


(21-32)


 


Anion Gap    3 (6-14) 


 


Blood Urea Nitrogen


 


 


 


 21 mg/dL


(7-20)


 


Creatinine


 


 


 


 0.6 mg/dL


(0.6-1.0)


 


Estimated GFR


(Cockcroft-Gault) 


 


 


 106.3 





 


Glucose Level


 


 


 


 126 mg/dL


(70-99)


 


Calcium Level


 


 


 


 8.8 mg/dL


(8.5-10.1)


 


Test


 7/5/20


12:49 


 


 





 


Glucose (Fingerstick)


 169 mg/dL


(70-99) 


 


 











Microbiology


6/30/20 Gram Stain - Final, Resulted


          


6/30/20 Aerobic and Anaerobic Culture - Preliminary, Resulted


          


6/30/20 Antimicrobic Susceptibility - Preliminary, Resulted


          


6/28/20 Blood Culture - Final, Complete


          NO GROWTH AFTER 5 DAYS


6/15/20 Gram Stain - Final, Complete


          


6/15/20 Aerobic and Anaerobic Culture - Final, Complete


          


6/13/20 Gram Stain Evaluation - Final, Complete


          


6/13/20 Respiratory Culture - Final, Complete


          


6/13/20 Antimicrobic Susceptibility - Final, Complete


          


6/7/20 Urine Culture - Final, Complete


         


5/30/20 Gram Stain - Final, Complete


          


5/30/20 Aerobic Culture - Final, Complete


Medications





Current Medications


Sodium Chloride 1,000 ml @  1,000 mls/hr Q1H IV  Last administered on 3/16/20at 

03:00;  Start 3/16/20 at 03:00;  Stop 3/16/20 at 03:59;  Status DC


Ondansetron HCl (Zofran) 4 mg 1X  ONCE IVP  Last administered on 3/16/20at 

03:27;  Start 3/16/20 at 03:00;  Stop 3/16/20 at 03:01;  Status DC


Morphine Sulfate (Morphine Sulfate) 4 mg 1X  ONCE IV ;  Start 3/16/20 at 03:00; 

Stop 3/16/20 at 03:01;  Status Cancel


Ketorolac Tromethamine (Toradol 30mg Vial) 30 mg 1X  ONCE IV  Last administered 

on 3/16/20at 02:54;  Start 3/16/20 at 03:00;  Stop 3/16/20 at 03:01;  Status DC


Fentanyl Citrate (Fentanyl 2ml Vial) 25 mcg 1X  ONCE IVP  Last administered on 

3/16/20at 03:23;  Start 3/16/20 at 03:30;  Stop 3/16/20 at 03:31;  Status DC


Fentanyl Citrate (Fentanyl 2ml Vial) 100 mcg STK-MED ONCE .ROUTE ;  Start 

3/16/20 at 03:18;  Stop 3/16/20 at 03:18;  Status DC


Iohexol (Omnipaque 350 Mg/ml) 90 ml 1X  ONCE IV  Last administered on 3/16/20at 

03:25;  Start 3/16/20 at 03:30;  Stop 3/16/20 at 03:31;  Status DC


Info (CONTRAST GIVEN -- Rx MONITORING) 1 each PRN DAILY  PRN MC SEE COMMENTS;  

Start 3/16/20 at 03:30;  Stop 3/18/20 at 03:29;  Status DC


Hydromorphone HCl (Dilaudid) 0.5 mg 1X  ONCE IV  Last administered on 3/16/20at 

03:55;  Start 3/16/20 at 04:30;  Stop 3/16/20 at 04:32;  Status DC


Ondansetron HCl (Zofran) 4 mg PRN Q8HRS  PRN IV NAUSEA/VOMITING 1ST CHOICE;  

Start 3/16/20 at 05:00;  Stop 3/16/20 at 09:27;  Status DC


Morphine Sulfate (Morphine Sulfate) 2 mg PRN Q2HR  PRN IV SEVERE PAIN 7-10 Last 

administered on 3/17/20at 12:26;  Start 3/16/20 at 05:00;  Stop 3/17/20 at 

14:15;  Status DC


Sodium Chloride 1,000 ml @  125 mls/hr Q8H IV  Last administered on 3/16/20at 

20:56;  Start 3/16/20 at 05:00;  Stop 3/17/20 at 04:59;  Status DC


Hydromorphone HCl (Dilaudid) 0.5 mg PRN Q3HRS  PRN IV SEVERE PAIN 7-10 Last 

administered on 3/17/20at 10:06;  Start 3/16/20 at 05:00;  Stop 3/17/20 at 

12:01;  Status DC


Piperacillin Sod/ Tazobactam Sod 4.5 gm/Sodium Chloride 100 ml @  200 mls/hr 1X 

ONCE IV  Last administered on 3/16/20at 05:44;  Start 3/16/20 at 06:00;  Stop 

3/16/20 at 06:29;  Status DC


Ondansetron HCl (Zofran) 4 mg PRN Q4HRS  PRN IV NAUSEA/VOMITING 1ST CHOICE Last 

administered on 6/27/20at 13:37;  Start 3/16/20 at 09:30


Insulin Human Lispro (HumaLOG) 0-9 UNITS Q6HRS SQ  Last administered on 7/5/20at

12:50;  Start 3/16/20 at 09:30


Dextrose (Dextrose 50%-Water Syringe) 12.5 gm PRN Q15MIN  PRN IV SEE COMMENTS;  

Start 3/16/20 at 09:30


Pantoprazole Sodium (PROTONIX VIAL for IV PUSH) 40 mg DAILYAC IVP  Last 

administered on 7/5/20at 08:19;  Start 3/16/20 at 11:30


Prochlorperazine Edisylate (Compazine) 10 mg PRN Q6HRS  PRN IV NAUSEA/VOMITING, 

2nd CHOICE Last administered on 6/27/20at 10:53;  Start 3/16/20 at 17:45


Atenolol (Tenormin) 100 mg DAILY PO ;  Start 3/17/20 at 09:00;  Stop 3/16/20 at 

20:08;  Status DC


Metoprolol Tartrate (Lopressor Vial) 2.5 mg Q6HRS IVP  Last administered on 

3/17/20at 05:51;  Start 3/16/20 at 20:15;  Stop 3/17/20 at 10:02;  Status DC


Metoprolol Tartrate (Lopressor Vial) 5 mg Q6HRS IVP  Last administered on 

3/26/20at 00:12;  Start 3/17/20 at 10:15;  Stop 3/28/20 at 08:48;  Status DC


Hydromorphone HCl (Dilaudid) 1 mg PRN Q3HRS  PRN IV SEVERE PAIN 7-10 Last 

administered on 3/23/20at 05:13;  Start 3/17/20 at 12:00;  Stop 3/31/20 at 

00:25;  Status DC


Lidocaine HCl (Buffered Lidocaine 1%) 3 ml STK-MED ONCE .ROUTE ;  Start 3/17/20 

at 12:55;  Stop 3/17/20 at 12:56;  Status DC


Albumin Human 500 ml @  125 mls/hr 1X  ONCE IV  Last administered on 3/17/20at 

14:33;  Start 3/17/20 at 14:30;  Stop 3/17/20 at 18:32;  Status DC


Norepinephrine Bitartrate 8 mg/ Dextrose 258 ml @  17.299 mls/ hr CONT  PRN IV 

PER PROTOCOL Last administered on 4/14/20at 12:48;  Start 3/17/20 at 15:30;  

Stop 4/17/20 at 09:19;  Status DC


Sodium Chloride 1,000 ml @  125 mls/hr Q8H IV  Last administered on 3/17/20at 

21:04;  Start 3/17/20 at 16:00;  Stop 3/18/20 at 02:42;  Status DC


Albumin Human 500 ml @  125 mls/hr PRN BID  PRN IV After every 2L NSS & BP < 

90mm Last administered on 6/30/20at 16:06;  Start 3/17/20 at 16:00;  Stop 7/3/20

at 09:30;  Status DC


Iohexol (Omnipaque 300 Mg/ml) 60 ml 1X  ONCE IV  Last administered on 3/17/20at 

17:20;  Start 3/17/20 at 17:00;  Stop 3/17/20 at 17:01;  Status DC


Info (CONTRAST GIVEN -- Rx MONITORING) 1 each PRN DAILY  PRN MC SEE COMMENTS;  

Start 3/17/20 at 17:00;  Stop 3/19/20 at 16:59;  Status DC


Meropenem 1 gm/ Sodium Chloride 100 ml @  200 mls/hr Q8HRS IV  Last administered

on 3/18/20at 05:45;  Start 3/17/20 at 20:00;  Stop 3/18/20 at 08:48;  Status DC


Furosemide (Lasix) 40 mg 1X  ONCE IVP  Last administered on 3/17/20at 22:12;  

Start 3/17/20 at 22:30;  Stop 3/17/20 at 22:31;  Status DC


Calcium Chloride 1000 mg/Sodium Chloride 110 ml @  220 mls/hr 1X  ONCE IV  Last 

administered on 3/17/20at 22:11;  Start 3/17/20 at 22:30;  Stop 3/17/20 at 

22:59;  Status DC


Albuterol Sulfate (Ventolin Neb Soln) 2.5 mg 1X  ONCE NEB  Last administered on 

3/18/20at 00:56;  Start 3/17/20 at 22:30;  Stop 3/17/20 at 22:31;  Status DC


Insulin Human Regular (HumuLIN R VIAL) 5 unit 1X  ONCE IV  Last administered on 

3/17/20at 22:14;  Start 3/17/20 at 22:30;  Stop 3/17/20 at 22:31;  Status DC


Magnesium Sulfate 50 ml @ 25 mls/hr 1X  ONCE IV  Last administered on 3/18/20at 

02:57;  Start 3/18/20 at 03:00;  Stop 3/18/20 at 04:59;  Status DC


Calcium Gluconate 1000 mg/Sodium Chloride 110 ml @  220 mls/hr 1X  ONCE IV  Last

administered on 3/18/20at 02:46;  Start 3/18/20 at 03:00;  Stop 3/18/20 at 

03:29;  Status DC


Sodium Chloride 1,000 ml @  200 mls/hr Q5H IV  Last administered on 3/18/20at 0

2:46;  Start 3/18/20 at 03:00;  Stop 3/18/20 at 10:21;  Status DC


Calcium Gluconate 1000 mg/Sodium Chloride 110 ml @  220 mls/hr 1X  ONCE IV  Last

administered on 3/18/20at 03:21;  Start 3/18/20 at 03:30;  Stop 3/18/20 at 

03:59;  Status DC


Sodium Bicarbonate 50 meq/Sodium Chloride 1,050 ml @  75 mls/hr Q14H IV  Last 

administered on 3/22/20at 21:10;  Start 3/18/20 at 07:30;  Stop 3/23/20 at 

10:28;  Status DC


Calcium Gluconate 2000 mg/Sodium Chloride 120 ml @  220 mls/hr 1X  ONCE IV  Last

administered on 3/18/20at 09:05;  Start 3/18/20 at 07:30;  Stop 3/18/20 at 

08:02;  Status DC


Lidocaine HCl (Xylocaine-Mpf 1% 2ml Vial) 2 ml STK-MED ONCE .ROUTE ;  Start 

3/18/20 at 08:47;  Stop 3/18/20 at 08:47;  Status DC


Meropenem 500 mg/ Sodium Chloride 50 ml @  100 mls/hr Q12HR IV  Last 

administered on 3/23/20at 21:01;  Start 3/18/20 at 18:00;  Stop 3/24/20 at 

07:58;  Status DC


Lidocaine HCl (Buffered Lidocaine 1%) 3 ml STK-MED ONCE .ROUTE ;  Start 3/18/20 

at 09:46;  Stop 3/18/20 at 09:46;  Status DC


Lidocaine HCl (Buffered Lidocaine 1%) 6 ml 1X  ONCE INJ  Last administered on 

3/18/20at 10:26;  Start 3/18/20 at 10:15;  Stop 3/18/20 at 10:16;  Status DC


Info (Tpn Per Pharmacy) 1 each PRN DAILY  PRN MC SEE COMMENTS Last administered 

on 7/5/20at 09:19;  Start 3/18/20 at 12:00


Sodium Chloride 1,000 ml @  1,000 mls/hr Q1H PRN IV hypotension;  Start 3/18/20 

at 12:07;  Stop 3/18/20 at 18:06;  Status DC


Diphenhydramine HCl (Benadryl) 25 mg 1X PRN  PRN IV ITCHING;  Start 3/18/20 at 

12:15;  Stop 3/19/20 at 12:14;  Status DC


Diphenhydramine HCl (Benadryl) 25 mg 1X PRN  PRN IV ITCHING;  Start 3/18/20 at 

12:15;  Stop 3/19/20 at 12:14;  Status DC


Sodium Chloride 1,000 ml @  400 mls/hr Q2H30M PRN IV PATENCY;  Start 3/18/20 at 

12:07;  Stop 3/19/20 at 00:06;  Status DC


Info (PHARMACY MONITORING -- do not chart) 1 each PRN DAILY  PRN MC SEE 

COMMENTS;  Start 3/18/20 at 12:15;  Stop 3/20/20 at 08:13;  Status DC


Sodium Chloride 90 meq/Calcium Gluconate 10 meq/ Multivitamins 10 ml/Chromium/ 

Copper/Manganese/ Seleni/Zn 1 ml/ Total Parenteral Nutrition/Amino 

Acids/Dextrose/ Fat Emulsion Intravenous 55.005 ml  @ 2.292 mls/hr TPN  CONT IV 

;  Start 3/18/20 at 22:00;  Stop 3/18/20 at 12:33;  Status DC


Info (Tpn Per Pharmacy) 1 each PRN DAILY  PRN MC SEE COMMENTS;  Start 3/18/20 at

12:30;  Status UNV


Sodium Chloride 90 meq/Calcium Gluconate 10 meq/ Multivitamins 10 ml/Chromium/ 

Copper/Manganese/ Seleni/Zn 0.5 ml/ Total Parenteral Nutrition/Amino 

Acids/Dextrose/ Fat Emulsion Intravenous 1,512 ml @  63 mls/hr TPN  CONT IV  

Last administered on 3/18/20at 22:06;  Start 3/18/20 at 22:00;  Stop 3/19/20 at 

21:59;  Status DC


Calcium Carbonate/ Glycine (Tums) 500 mg PRN AFTMEALHC  PRN PO INDIGESTION;  

Start 3/18/20 at 17:45;  Stop 5/13/20 at 10:25;  Status DC


Calcium Gluconate (Calcium Gluconate) 2,000 mg 1X  ONCE IVP  Last administered 

on 3/19/20at 02:19;  Start 3/19/20 at 02:15;  Stop 3/19/20 at 02:16;  Status DC


Calcium Chloride 3000 mg/Sodium Chloride 1,030 ml @  50 mls/hr [id number] IV  Last 

administered on 3/21/20at 02:17;  Start 3/19/20 at 08:00;  Stop 3/21/20 at 1

5:23;  Status DC


Lorazepam (Ativan Inj) 1 mg PRN Q4HRS  PRN IVP ANXIETY / AGITATION, 2nd choic 

Last administered on 4/17/20at 03:51;  Start 3/19/20 at 09:00;  Stop 4/17/20 at 

09:19;  Status DC


Sodium Chloride 1,000 ml @  1,000 mls/hr Q1H PRN IV hypotension;  Start 3/19/20 

at 08:56;  Stop 3/19/20 at 14:55;  Status DC


Albumin Human 200 ml @  200 mls/hr 1X PRN  PRN IV Hypotension;  Start 3/19/20 at

09:00;  Stop 3/19/20 at 14:59;  Status DC


Diphenhydramine HCl (Benadryl) 25 mg 1X PRN  PRN IV ITCHING;  Start 3/19/20 at 

09:00;  Stop 3/20/20 at 08:59;  Status DC


Diphenhydramine HCl (Benadryl) 25 mg 1X PRN  PRN IV ITCHING;  Start 3/19/20 at 

09:00;  Stop 3/20/20 at 08:59;  Status DC


Sodium Chloride 1,000 ml @  400 mls/hr Q2H30M PRN IV PATENCY;  Start 3/19/20 at 

08:56;  Stop 3/19/20 at 20:55;  Status DC


Info (PHARMACY MONITORING -- do not chart) 1 each PRN DAILY  PRN MC SEE 

COMMENTS;  Start 3/19/20 at 09:00;  Status UNV


Info (PHARMACY MONITORING -- do not chart) 1 each PRN DAILY  PRN MC SEE 

COMMENTS;  Start 3/19/20 at 09:00;  Stop 3/20/20 at 08:13;  Status DC


Digoxin (Lanoxin) 500 mcg 1X  ONCE IV  Last administered on 3/19/20at 10:04;  

Start 3/19/20 at 10:00;  Stop 3/19/20 at 10:01;  Status DC


Digoxin (Lanoxin) 125 mcg 1X  ONCE IV  Last administered on 3/19/20at 17:10;  

Start 3/19/20 at 18:00;  Stop 3/19/20 at 18:01;  Status DC


Magnesium Sulfate 100 ml @  25 mls/hr 1X  ONCE IV  Last administered on 

3/19/20at 12:48;  Start 3/19/20 at 13:00;  Stop 3/19/20 at 16:59;  Status DC


Sodium Chloride 90 meq/Magnesium Sulfate 10 meq/ Calcium Gluconate 20 meq/ 

Multivitamins 10 ml/Chromium/ Copper/Manganese/ Seleni/Zn 0.5 ml/ Total 

Parenteral Nutrition/Amino Acids/Dextrose/ Fat Emulsion Intravenous 1,512 ml @  

63 mls/hr TPN  CONT IV  Last administered on 3/19/20at 22:25;  Start 3/19/20 at 

22:00;  Stop 3/20/20 at 21:59;  Status DC


Sodium Chloride 1,000 ml @  1,000 mls/hr Q1H PRN IV hypotension;  Start 3/20/20 

at 08:05;  Stop 3/20/20 at 14:04;  Status DC


Albumin Human 200 ml @  200 mls/hr 1X  ONCE IV  Last administered on 3/20/20at 

08:57;  Start 3/20/20 at 08:15;  Stop 3/20/20 at 09:14;  Status DC


Diphenhydramine HCl (Benadryl) 25 mg 1X PRN  PRN IV ITCHING;  Start 3/20/20 at 

08:15;  Stop 3/21/20 at 08:14;  Status DC


Diphenhydramine HCl (Benadryl) 25 mg 1X PRN  PRN IV ITCHING;  Start 3/20/20 at 

08:15;  Stop 3/21/20 at 08:14;  Status DC


Sodium Chloride 1,000 ml @  400 mls/hr Q2H30M PRN IV PATENCY;  Start 3/20/20 at 

08:05;  Stop 3/20/20 at 20:04;  Status DC


Info (PHARMACY MONITORING -- do not chart) 1 each PRN DAILY  PRN MC SEE 

COMMENTS;  Start 3/20/20 at 08:15;  Stop 3/24/20 at 07:57;  Status DC


Sodium Chloride 90 meq/Potassium Chloride 15 meq/ Potassium Phosphate 10 mmol/ 

Magnesium Sulfate 10 meq/Calcium Gluconate 20 meq/ Multivitamins 10 ml/Chromium/

Copper/Manganese/ Seleni/Zn 0.5 ml/ Total Parenteral Nutrition/Amino 

Acids/Dextrose/ Fat Emulsion Intravenous 1,512 ml @  63 mls/hr TPN  CONT IV  

Last administered on 3/20/20at 21:01;  Start 3/20/20 at 22:00;  Stop 3/21/20 at 

21:59;  Status DC


Potassium Chloride/Water 100 ml @  100 mls/hr 1X  ONCE IV  Last administered on 

3/20/20at 14:09;  Start 3/20/20 at 14:00;  Stop 3/20/20 at 14:59;  Status DC


Benzocaine (Hurricaine One) 1 spray 1X  ONCE MM  Last administered on 3/20/20at 

16:38;  Start 3/20/20 at 14:30;  Stop 3/20/20 at 14:31;  Status DC


Lidocaine HCl (Glydo (Lidocaine) Jelly) 1 thomas 1X  ONCE MM  Last administered on 

3/20/20at 16:38;  Start 3/20/20 at 14:30;  Stop 3/20/20 at 14:31;  Status DC


Linezolid/Dextrose 300 ml @  300 mls/hr Q12HR IV  Last administered on 3/26/20at

21:04;  Start 3/20/20 at 20:00;  Stop 3/27/20 at 07:50;  Status DC


Acetaminophen (Tylenol) 650 mg PRN Q6HRS  PRN PO MILD PAIN / TEMP;  Start 

3/21/20 at 03:30;  Stop 3/21/20 at 03:36;  Status DC


Acetaminophen (Tylenol) 650 mg PRN Q6HRS  PRN PEG MILD PAIN / TEMP Last 

administered on 4/16/20at 19:56;  Start 3/21/20 at 03:36;  Stop 5/13/20 at 

10:25;  Status DC


Sodium Chloride 1,000 ml @  1,000 mls/hr Q1H PRN IV hypotension;  Start 3/21/20 

at 07:50;  Stop 3/21/20 at 13:49;  Status DC


Albumin Human 200 ml @  200 mls/hr 1X PRN  PRN IV Hypotension;  Start 3/21/20 at

08:00;  Stop 3/21/20 at 13:59;  Status DC


Sodium Chloride (Normal Saline Flush) 10 ml 1X PRN  PRN IV AP catheter pack;  

Start 3/21/20 at 08:00;  Stop 3/22/20 at 07:59;  Status DC


Sodium Chloride (Normal Saline Flush) 10 ml 1X PRN  PRN IV  catheter pack;  

Start 3/21/20 at 08:00;  Stop 3/22/20 at 07:59;  Status DC


Sodium Chloride 1,000 ml @  400 mls/hr Q2H30M PRN IV PATENCY;  Start 3/21/20 at 

07:50;  Stop 3/21/20 at 19:49;  Status DC


Info (PHARMACY MONITORING -- do not chart) 1 each PRN DAILY  PRN MC SEE 

COMMENTS;  Start 3/21/20 at 08:00;  Status UNV


Info (PHARMACY MONITORING -- do not chart) 1 each PRN DAILY  PRN MC SEE 

COMMENTS;  Start 3/21/20 at 08:00;  Stop 3/23/20 at 08:25;  Status DC


Sodium Chloride 90 meq/Potassium Chloride 15 meq/ Potassium Phosphate 10 mmol/ 

Magnesium Sulfate 10 meq/Calcium Gluconate 20 meq/ Multivitamins 10 ml/Chromium/

Copper/Manganese/ Seleni/Zn 0.5 ml/ Total Parenteral Nutrition/Amino 

Acids/Dextrose/ Fat Emulsion Intravenous 1,512 ml @  63 mls/hr TPN  CONT IV  

Last administered on 3/21/20at 20:57;  Start 3/21/20 at 22:00;  Stop 3/22/20 at 

21:59;  Status DC


Sodium Chloride 90 meq/Potassium Chloride 15 meq/ Potassium Phosphate 15 mmol/ 

Magnesium Sulfate 10 meq/Calcium Gluconate 20 meq/ Multivitamins 10 ml/Chromium/

Copper/Manganese/ Seleni/Zn 0.5 ml/ Total Parenteral Nutrition/Amino 

Acids/Dextrose/ Fat Emulsion Intravenous 1,512 ml @  63 mls/hr TPN  CONT IV ;  

Start 3/22/20 at 22:00;  Stop 3/22/20 at 14:16;  Status DC


Sodium Chloride 90 meq/Potassium Chloride 15 meq/ Potassium Phosphate 15 mmol/ 

Magnesium Sulfate 10 meq/Calcium Gluconate 20 meq/ Multivitamins 10 ml/Chromium/

Copper/Manganese/ Seleni/Zn 0.5 ml/ Total Parenteral Nutrition/Amino 

Acids/Dextrose/ Fat Emulsion Intravenous 1,200 ml @  50 mls/hr TPN  CONT IV ;  

Start 3/22/20 at 22:00;  Stop 3/22/20 at 14:17;  Status DC


Sodium Chloride 90 meq/Potassium Chloride 15 meq/ Potassium Phosphate 10 mmol/ 

Magnesium Sulfate 10 meq/Calcium Gluconate 20 meq/ Multivitamins 10 ml/Chromium/

Copper/Manganese/ Seleni/Zn 0.5 ml/ Total Parenteral Nutrition/Amino 

Acids/Dextrose/ Fat Emulsion Intravenous 1,200 ml @  50 mls/hr TPN  CONT IV  Las

t administered on 3/22/20at 23:29;  Start 3/22/20 at 22:00;  Stop 3/23/20 at 

21:59;  Status DC


Sodium Chloride 1,000 ml @  1,000 mls/hr Q1H PRN IV hypotension;  Start 3/23/20 

at 07:28;  Stop 3/23/20 at 13:27;  Status DC


Albumin Human 200 ml @  200 mls/hr 1X  ONCE IV  Last administered on 3/23/20at 

08:51;  Start 3/23/20 at 07:30;  Stop 3/23/20 at 08:29;  Status DC


Diphenhydramine HCl (Benadryl) 25 mg 1X PRN  PRN IV ITCHING;  Start 3/23/20 at 

07:30;  Stop 3/24/20 at 07:29;  Status DC


Diphenhydramine HCl (Benadryl) 25 mg 1X PRN  PRN IV ITCHING;  Start 3/23/20 at 

07:30;  Stop 3/24/20 at 07:29;  Status DC


Sodium Chloride 1,000 ml @  400 mls/hr Q2H30M PRN IV PATENCY;  Start 3/23/20 at 

07:28;  Stop 3/23/20 at 19:27;  Status DC


Info (PHARMACY MONITORING -- do not chart) 1 each PRN DAILY  PRN MC SEE 

COMMENTS;  Start 3/23/20 at 07:30;  Stop 4/3/20 at 13:01;  Status DC


Metronidazole 100 ml @  100 mls/hr Q6HRS IV  Last administered on 4/8/20at 

06:26;  Start 3/23/20 at 08:30;  Stop 4/8/20 at 09:58;  Status DC


Micafungin Sodium 100 mg/Dextrose 100 ml @  100 mls/hr Q24H IV  Last 

administered on 4/30/20at 08:18;  Start 3/23/20 at 09:00;  Stop 4/30/20 at 

20:58;  Status DC


Propofol 0 ml @ As Directed STK-MED ONCE IV ;  Start 3/23/20 at 07:53;  Stop 

3/23/20 at 07:53;  Status DC


Etomidate (Amidate) 20 mg STK-MED ONCE IV ;  Start 3/23/20 at 07:53;  Stop 

3/23/20 at 07:54;  Status DC


Midazolam HCl (Versed) 5 mg STK-MED ONCE .ROUTE ;  Start 3/23/20 at 07:57;  Stop

3/23/20 at 07:57;  Status DC


Fentanyl Citrate 30 ml @ 0 mls/hr CONT  PRN IV SEE PROTOCOL Last administered on

4/17/20at 06:12;  Start 3/23/20 at 08:15;  Stop 4/17/20 at 09:19;  Status DC


Artificial Tears (Artificial Tears) 1 drop PRN Q1HR  PRN OU DRY EYE, 1st choice;

 Start 3/23/20 at 08:15;  Stop 4/29/20 at 05:31;  Status DC


Midazolam HCl 50 mg/Sodium Chloride 50 ml @ 0 mls/hr CONT  PRN IV SEE PROTOCOL 

Last administered on 3/26/20at 22:39;  Start 3/23/20 at 08:15;  Stop 3/28/20 at 

15:59;  Status DC


Etomidate (Amidate) 8 mg 1X  ONCE IV  Last administered on 3/23/20at 08:33;  S

tart 3/23/20 at 08:30;  Stop 3/23/20 at 08:31;  Status DC


Succinylcholine Chloride (Anectine) 120 mg 1X  ONCE IV  Last administered on 

3/23/20at 08:34;  Start 3/23/20 at 08:30;  Stop 3/23/20 at 08:31;  Status DC


Midazolam HCl (Versed) 5 mg 1X  ONCE IV ;  Start 3/23/20 at 08:30;  Stop 3/23/20

at 08:31;  Status DC


Potassium Chloride 15 meq/ Bicarbonate Dialysis Soln w/ out KCl 5,007.5 ml  @ 

1,000 mls/ hr Q5H1M IV  Last administered on 3/24/20at 11:11;  Start 3/23/20 at 

12:00;  Stop 3/24/20 at 11:15;  Status DC


Potassium Chloride 15 meq/ Bicarbonate Dialysis Soln w/ out KCl 5,007.5 ml  @ 

1,000 mls/ hr Q5H1M IV  Last administered on 3/24/20at 11:12;  Start 3/23/20 at 

12:00;  Stop 3/24/20 at 11:17;  Status DC


Potassium Chloride 15 meq/ Bicarbonate Dialysis Soln w/ out KCl 5,007.5 ml  @ 

1,000 mls/ hr Q5H1M IV  Last administered on 3/24/20at 11:11;  Start 3/23/20 at 

12:00;  Stop 3/24/20 at 11:19;  Status DC


Sodium Chloride 90 meq/Potassium Chloride 15 meq/ Potassium Phosphate 10 mmol/ 

Magnesium Sulfate 10 meq/Calcium Gluconate 20 meq/ Multivitamins 10 ml/Chromium/

Copper/Manganese/ Seleni/Zn 0.5 ml/ Total Parenteral Nutrition/Amino Acids/Dex

trose/ Fat Emulsion Intravenous 1,400 ml @  58.333 mls/ hr TPN  CONT IV  Last 

administered on 3/23/20at 21:42;  Start 3/23/20 at 22:00;  Stop 3/24/20 at 

21:59;  Status DC


Heparin Sodium (Porcine) (Heparin Sodium) 5,000 unit Q8HRS SQ  Last administered

on 3/28/20at 05:55;  Start 3/23/20 at 15:00;  Stop 3/28/20 at 13:28;  Status DC


Meropenem 500 mg/ Sodium Chloride 50 ml @  100 mls/hr Q6HRS IV  Last 

administered on 3/25/20at 06:00;  Start 3/24/20 at 09:00;  Stop 3/25/20 at 

07:29;  Status DC


Potassium Phosphate 20 mmol/ Sodium Chloride 106.6667 ml @  51.667 m... 1X  ONCE

IV  Last administered on 3/24/20at 11:22;  Start 3/24/20 at 10:15;  Stop 3/24/20

at 12:18;  Status DC


Acetaminophen (Tylenol Supp) 650 mg PRN Q6HRS  PRN MT MILD PAIN / TEMP > 100.3'F

Last administered on 6/29/20at 18:16;  Start 3/24/20 at 10:30


Potassium Chloride/Water 100 ml @  100 mls/hr Q1H IV  Last administered on 3

/24/20at 12:12;  Start 3/24/20 at 11:00;  Stop 3/24/20 at 12:59;  Status DC


Potassium Chloride 20 meq/ Bicarbonate Dialysis Soln w/ out KCl 5,010 ml @  

1,000 mls/hr Q5H1M IV  Last administered on 3/25/20at 08:48;  Start 3/24/20 at 

12:00;  Stop 3/25/20 at 13:03;  Status DC


Potassium Chloride 20 meq/ Bicarbonate Dialysis Soln w/ out KCl 5,010 ml @  

1,000 mls/hr Q5H1M IV  Last administered on 3/29/20at 14:52;  Start 3/24/20 at 

11:30;  Stop 3/29/20 at 19:59;  Status DC


Potassium Chloride 20 meq/ Bicarbonate Dialysis Soln w/ out KCl 5,010 ml @  

1,000 mls/hr Q5H1M IV  Last administered on 3/29/20at 14:53;  Start 3/24/20 at 

11:30;  Stop 3/29/20 at 19:59;  Status DC


Sodium Chloride 90 meq/Potassium Chloride 15 meq/ Potassium Phosphate 15 mmol/ 

Magnesium Sulfate 10 meq/Calcium Gluconate 15 meq/ Multivitamins 10 ml/Chromium/

Copper/Manganese/ Seleni/Zn 0.5 ml/ Total Parenteral Nutrition/Amino 

Acids/Dextrose/ Fat Emulsion Intravenous 1,400 ml @  58.333 mls/ hr TPN  CONT IV

 Last administered on 3/24/20at 22:17;  Start 3/24/20 at 22:00;  Stop 3/25/20 at

21:59;  Status DC


Cefepime HCl (Maxipime) 2 gm Q12HR IVP  Last administered on 4/7/20at 20:56;  

Start 3/25/20 at 09:00;  Stop 4/8/20 at 09:58;  Status DC


Daptomycin 500 mg/ Sodium Chloride 50 ml @  100 mls/hr Q48H IV  Last 

administered on 4/10/20at 09:57;  Start 3/25/20 at 08:30;  Stop 4/10/20 at 

10:07;  Status DC


Lidocaine HCl (Buffered Lidocaine 1%) 3 ml 1X  ONCE INJ  Last administered on 

3/25/20at 10:27;  Start 3/25/20 at 10:30;  Stop 3/25/20 at 10:31;  Status DC


Potassium Phosphate 20 mmol/ Sodium Chloride 106.6667 ml @  51.667 m... 1X  ONCE

IV  Last administered on 3/25/20at 12:51;  Start 3/25/20 at 13:00;  Stop 3/25/20

at 15:03;  Status DC


Sodium Chloride 90 meq/Potassium Chloride 15 meq/ Potassium Phosphate 18 mmol/ M

agnesium Sulfate 8 meq/Calcium Gluconate 15 meq/ Multivitamins 10 ml/Chromium/ 

Copper/Manganese/ Seleni/Zn 0.5 ml/ Total Parenteral Nutrition/Amino 

Acids/Dextrose/ Fat Emulsion Intravenous 1,400 ml @  58.333 mls/ hr TPN  CONT IV

 Last administered on 3/25/20at 22:16;  Start 3/25/20 at 22:00;  Stop 3/26/20 at

21:59;  Status DC


Potassium Chloride 20 meq/ Bicarbonate Dialysis Soln w/ out KCl 5,010 ml @  

1,000 mls/hr Q5H1M IV  Last administered on 3/29/20at 14:54;  Start 3/25/20 at 

16:00;  Stop 3/29/20 at 19:59;  Status DC


Multi-Ingred Cream/Lotion/Oil/ Oint (Artificial Tears Eye Ointment) 1 thomas PRN 

Q1HR  PRN OU DRY EYE, 2nd choice Last administered on 4/13/20at 08:19;  Start 

3/25/20 at 17:30;  Stop 6/3/20 at 14:39;  Status DC


Sodium Chloride 90 meq/Potassium Chloride 15 meq/ Potassium Phosphate 18 mmol/ 

Magnesium Sulfate 8 meq/Calcium Gluconate 15 meq/ Multivitamins 10 ml/Chromium/ 

Copper/Manganese/ Seleni/Zn 0.5 ml/ Total Parenteral Nutrition/Amino 

Acids/Dextrose/ Fat Emulsion Intravenous 1,400 ml @  58.333 mls/ hr TPN  CONT IV

 Last administered on 3/26/20at 22:00;  Start 3/26/20 at 22:00;  Stop 3/27/20 at

21:59;  Status DC


Albumin Human 500 ml @  125 mls/hr 1X  ONCE IV ;  Start 3/26/20 at 14:15;  Stop 

3/26/20 at 18:14;  Status DC


Sodium Chloride 90 meq/Potassium Chloride 15 meq/ Potassium Phosphate 18 mmol/ 

Magnesium Sulfate 8 meq/Calcium Gluconate 15 meq/ Multivitamins 10 ml/Chromium/ 

Copper/Manganese/ Seleni/Zn 0.5 ml/ Insulin Human Regular 10 unit/ Total 

Parenteral Nutrition/Amino Acids/Dextrose/ Fat Emulsion Intravenous 1,400 ml @  

58.333 mls/ hr TPN  CONT IV  Last administered on 3/27/20at 21:43;  Start 

3/27/20 at 22:00;  Stop 3/28/20 at 21:59;  Status DC


Lidocaine HCl (Buffered Lidocaine 1%) 3 ml STK-MED ONCE .ROUTE ;  Start 3/25/20 

at 10:00;  Stop 3/27/20 at 13:57;  Status DC


Midazolam HCl 100 mg/Sodium Chloride 100 ml @ 7 mls/hr CONT  PRN IV SEE PROTOCOL

Last administered on 4/8/20at 15:35;  Start 3/28/20 at 16:00;  Stop 6/3/20 at 

14:38;  Status DC


Sodium Chloride 90 meq/Potassium Chloride 15 meq/ Potassium Phosphate 18 mmol/ 

Magnesium Sulfate 8 meq/Calcium Gluconate 15 meq/ Multivitamins 10 ml/Chromium/ 

Copper/Manganese/ Seleni/Zn 0.5 ml/ Insulin Human Regular 15 unit/ Total 

Parenteral Nutrition/Amino Acids/Dextrose/ Fat Emulsion Intravenous 1,400 ml @  

58.333 mls/ hr TPN  CONT IV  Last administered on 3/28/20at 20:34;  Start 

3/28/20 at 22:00;  Stop 3/29/20 at 21:59;  Status DC


Info ([facility] Electrolyte Protocol) 1 ea CONT PRN  PRN MC PER PROTOCOL;  Start 

3/29/20 at 13:15


Sodium Chloride 90 meq/Potassium Chloride 15 meq/ Potassium Phosphate 18 mmol/ 

Magnesium Sulfate 8 meq/Calcium Gluconate 15 meq/ Multivitamins 10 ml/Chromium/ 

Copper/Manganese/ Seleni/Zn 0.5 ml/ Insulin Human Regular 15 unit/ Total 

Parenteral Nutrition/Amino Acids/Dextrose/ Fat Emulsion Intravenous 1,400 ml @  

58.333 mls/ hr TPN  CONT IV  Last administered on 3/29/20at 22:05;  Start 

3/29/20 at 22:00;  Stop 3/30/20 at 21:59;  Status DC


Potassium Chloride 15 meq/ Bicarbonate Dialysis Soln w/ out KCl 5,007.5 ml  @ 

1,000 mls/ hr Q5H1M IV  Last administered on 4/1/20at 18:14;  Start 3/29/20 at 

20:00;  Stop 4/2/20 at 13:08;  Status DC


Potassium Chloride 15 meq/ Bicarbonate Dialysis Soln w/ out KCl 5,007.5 ml  @ 

1,000 mls/ hr Q5H1M IV  Last administered on 4/1/20at 18:14;  Start 3/29/20 at 

20:00;  Stop 4/2/20 at 13:08;  Status DC


Potassium Chloride 15 meq/ Bicarbonate Dialysis Soln w/ out KCl 5,007.5 ml  @ 

1,000 mls/ hr Q5H1M IV  Last administered on 4/1/20at 18:14;  Start 3/29/20 at 

20:00;  Stop 4/2/20 at 13:08;  Status DC


Iohexol (Omnipaque 240 Mg/ml) 30 ml 1X  ONCE PO  Last administered on 3/30/20at 

11:30;  Start 3/30/20 at 11:30;  Stop 3/30/20 at 11:33;  Status DC


Info (CONTRAST GIVEN -- Rx MONITORING) 1 each PRN DAILY  PRN MC SEE COMMENTS;  

Start 3/30/20 at 11:45;  Stop 4/1/20 at 11:44;  Status DC


Sodium Chloride 90 meq/Potassium Chloride 15 meq/ Potassium Phosphate 18 mmol/ 

Magnesium Sulfate 8 meq/Calcium Gluconate 15 meq/ Multivitamins 10 ml/Chromium/ 

Copper/Manganese/ Seleni/Zn 0.5 ml/ Insulin Human Regular 15 unit/ Total 

Parenteral Nutrition/Amino Acids/Dextrose/ Fat Emulsion Intravenous 1,400 ml @  

58.333 mls/ hr TPN  CONT IV  Last administered on 3/30/20at 21:47;  Start 

3/30/20 at 22:00;  Stop 3/31/20 at 21:59;  Status DC


Sodium Chloride 90 meq/Potassium Chloride 15 meq/ Potassium Phosphate 18 mmol/ 

Magnesium Sulfate 8 meq/Calcium Gluconate 15 meq/ Multivitamins 10 ml/Chromium/ 

Copper/Manganese/ Seleni/Zn 0.5 ml/ Insulin Human Regular 20 unit/ Total 

Parenteral Nutrition/Amino Acids/Dextrose/ Fat Emulsion Intravenous 1,400 ml @  

58.333 mls/ hr TPN  CONT IV  Last administered on 3/31/20at 21:36;  Start 

3/31/20 at 22:00;  Stop 4/1/20 at 21:59;  Status DC


Alteplase, Recombinant (Cathflo For Central Catheter Clearance) 1 mg 1X  ONCE 

INT CAT  Last administered on 3/31/20at 20:03;  Start 3/31/20 at 19:30;  Stop 

3/31/20 at 19:46;  Status DC


Alteplase, Recombinant (Cathflo For Central Catheter Clearance) 1 mg 1X  ONCE 

INT CAT  Last administered on 3/31/20at 22:05;  Start 3/31/20 at 22:00;  Stop 

3/31/20 at 22:01;  Status DC


Sodium Chloride 90 meq/Potassium Chloride 15 meq/ Potassium Phosphate 18 mmol/ 

Magnesium Sulfate 8 meq/Calcium Gluconate 15 meq/ Multivitamins 10 ml/Chromium/ 

Copper/Manganese/ Seleni/Zn 0.5 ml/ Insulin Human Regular 20 unit/ Total 

Parenteral Nutrition/Amino Acids/Dextrose/ Fat Emulsion Intravenous 1,400 ml @  

58.333 mls/ hr TPN  CONT IV  Last administered on 4/1/20at 21:30;  Start 4/1/20 

at 22:00;  Stop 4/2/20 at 21:59;  Status DC


Dexmedetomidine HCl 400 mcg/ Sodium Chloride 100 ml @ 0 mls/hr CONT  PRN IV 

ANXIETY / AGITATION Last administered on 5/30/20at 12:57;  Start 4/2/20 at 

08:15;  Stop 5/30/20 at 18:31;  Status DC


Sodium Chloride 500 ml @  500 mls/hr 1X PRN  PRN IV ELEVATED BP, SEE COMMENTS;  

Start 4/2/20 at 08:15


Atropine Sulfate (ATROPINE 0.5mg SYRINGE) 0.5 mg PRN Q5MIN  PRN IV SEE COMMENTS;

 Start 4/2/20 at 08:15


Furosemide (Lasix) 20 mg 1X  ONCE IVP  Last administered on 4/2/20at 08:19;  

Start 4/2/20 at 08:15;  Stop 4/2/20 at 08:16;  Status DC


Lidocaine HCl (Buffered Lidocaine 1%) 3 ml STK-MED ONCE .ROUTE ;  Start 4/2/20 

at 08:39;  Stop 4/2/20 at 08:39;  Status DC


Lidocaine HCl (Buffered Lidocaine 1%) 6 ml 1X  ONCE INJ  Last administered on 

4/2/20at 09:05;  Start 4/2/20 at 09:00;  Stop 4/2/20 at 09:06;  Status DC


Sodium Chloride 90 meq/Potassium Chloride 15 meq/ Potassium Phosphate 18 mmol/ 

Magnesium Sulfate 8 meq/Calcium Gluconate 15 meq/ Multivitamins 10 ml/Chromium/ 

Copper/Manganese/ Seleni/Zn 0.5 ml/ Insulin Human Regular 20 unit/ Total 

Parenteral Nutrition/Amino Acids/Dextrose/ Fat Emulsion Intravenous 1,400 ml @  

58.333 mls/ hr TPN  CONT IV  Last administered on 4/2/20at 22:45;  Start 4/2/20 

at 22:00;  Stop 4/3/20 at 21:59;  Status DC


Sodium Chloride 1,000 ml @  1,000 mls/hr Q1H PRN IV hypotension;  Start 4/3/20 

at 07:30;  Stop 4/3/20 at 13:29;  Status DC


Albumin Human 200 ml @  200 mls/hr 1X PRN  PRN IV Hypotension Last administered 

on 4/3/20at 09:36;  Start 4/3/20 at 07:30;  Stop 4/3/20 at 13:29;  Status DC


Sodium Chloride (Normal Saline Flush) 10 ml 1X PRN  PRN IV AP catheter pack;  

Start 4/3/20 at 07:30;  Stop 4/3/20 at 21:29;  Status DC


Sodium Chloride (Normal Saline Flush) 10 ml 1X PRN  PRN IV  catheter pack;  

Start 4/3/20 at 07:30;  Stop 4/4/20 at 07:29;  Status DC


Sodium Chloride 1,000 ml @  400 mls/hr Q2H30M PRN IV PATENCY;  Start 4/3/20 at 

07:30;  Stop 4/3/20 at 19:29;  Status DC


Info (PHARMACY MONITORING -- do not chart) 1 each PRN DAILY  PRN MC SEE 

COMMENTS;  Start 4/3/20 at 07:30;  Stop 4/3/20 at 13:02;  Status DC


Info (PHARMACY MONITORING -- do not chart) 1 each PRN DAILY  PRN MC SEE 

COMMENTS;  Start 4/3/20 at 07:30;  Stop 4/5/20 at 12:45;  Status DC


Sodium Chloride 90 meq/Potassium Chloride 15 meq/ Potassium Phosphate 10 mmol/ 

Magnesium Sulfate 8 meq/Calcium Gluconate 15 meq/ Multivitamins 10 ml/Chromium/ 

Copper/Manganese/ Seleni/Zn 0.5 ml/ Insulin Human Regular 25 unit/ Total 

Parenteral Nutrition/Amino Acids/Dextrose/ Fat Emulsion Intravenous 1,400 ml @  

58.333 mls/ hr TPN  CONT IV  Last administered on 4/3/20at 22:19;  Start 4/3/20 

at 22:00;  Stop 4/4/20 at 21:59;  Status DC


Heparin Sodium (Porcine) (Heparin Sodium) 5,000 unit Q12HR SQ  Last administered

on 4/26/20at 08:59;  Start 4/3/20 at 21:00;  Stop 4/26/20 at 10:05;  Status DC


Ondansetron HCl (Zofran) 4 mg PRN Q6HRS  PRN IV NAUSEA/VOMITING;  Start 4/6/20 

at 07:00;  Stop 4/7/20 at 06:59;  Status DC


Fentanyl Citrate (Fentanyl 2ml Vial) 25 mcg PRN Q5MIN  PRN IV MILD PAIN 1-3;  

Start 4/6/20 at 07:00;  Stop 4/7/20 at 06:59;  Status DC


Fentanyl Citrate (Fentanyl 2ml Vial) 50 mcg PRN Q5MIN  PRN IV MODERATE TO SEVERE

PAIN;  Start 4/6/20 at 07:00;  Stop 4/7/20 at 06:59;  Status DC


Ringer's Solution 1,000 ml @  30 mls/hr Q24H IV ;  Start 4/6/20 at 07:00;  Stop 

4/6/20 at 18:59;  Status DC


Lidocaine HCl (Xylocaine-Mpf 1% 2ml Vial) 2 ml PRN 1X  PRN ID PRIOR TO IV START;

 Start 4/6/20 at 07:00;  Stop 4/7/20 at 06:59;  Status DC


Prochlorperazine Edisylate (Compazine) 5 mg PACU PRN  PRN IV NAUSEA, MRX1;  

Start 4/6/20 at 07:00;  Stop 4/7/20 at 06:59;  Status DC


Sodium Chloride 1,000 ml @  1,000 mls/hr Q1H PRN IV hypotension;  Start 4/4/20 

at 09:10;  Stop 4/4/20 at 15:09;  Status DC


Albumin Human 200 ml @  200 mls/hr 1X PRN  PRN IV Hypotension Last administered 

on 4/4/20at 10:10;  Start 4/4/20 at 09:15;  Stop 4/4/20 at 15:14;  Status DC


Sodium Chloride 1,000 ml @  400 mls/hr Q2H30M PRN IV PATENCY;  Start 4/4/20 at 

09:10;  Stop 4/4/20 at 21:09;  Status DC


Info (PHARMACY MONITORING -- do not chart) 1 each PRN DAILY  PRN MC SEE 

COMMENTS;  Start 4/4/20 at 09:15;  Stop 4/5/20 at 12:45;  Status DC


Info (PHARMACY MONITORING -- do not chart) 1 each PRN DAILY  PRN MC SEE 

COMMENTS;  Start 4/4/20 at 09:15;  Stop 4/5/20 at 12:45;  Status DC


Sodium Chloride 90 meq/Potassium Chloride 15 meq/ Potassium Phosphate 10 mmol/ 

Magnesium Sulfate 8 meq/Calcium Gluconate 15 meq/ Multivitamins 10 ml/Chromium/ 

Copper/Manganese/ Seleni/Zn 0.5 ml/ Insulin Human Regular 25 unit/ Total 

Parenteral Nutrition/Amino Acids/Dextrose/ Fat Emulsion Intravenous 1,400 ml @  

58.333 mls/ hr TPN  CONT IV  Last administered on 4/4/20at 22:10;  Start 4/4/20 

at 22:00;  Stop 4/5/20 at 21:59;  Status DC


Magnesium Sulfate 50 ml @ 25 mls/hr PRN DAILY  PRN IV for Mag < 1.7 on am labs 

Last administered on 6/18/20at 10:57;  Start 4/5/20 at 09:15


Sodium Chloride 90 meq/Potassium Chloride 15 meq/ Potassium Phosphate 10 mmol/ 

Magnesium Sulfate 8 meq/Calcium Gluconate 15 meq/ Multivitamins 10 ml/Chromium/ 

Copper/Manganese/ Seleni/Zn 0.5 ml/ Insulin Human Regular 25 unit/ Total 

Parenteral Nutrition/Amino Acids/Dextrose/ Fat Emulsion Intravenous 1,400 ml @  

58.333 mls/ hr TPN  CONT IV  Last administered on 4/5/20at 21:20;  Start 4/5/20 

at 22:00;  Stop 4/6/20 at 21:59;  Status DC


Sodium Chloride 1,000 ml @  1,000 mls/hr Q1H PRN IV hypotension;  Start 4/5/20 

at 12:23;  Stop 4/5/20 at 18:22;  Status DC


Albumin Human 200 ml @  200 mls/hr 1X  ONCE IV  Last administered on 4/5/20at 

13:34;  Start 4/5/20 at 12:30;  Stop 4/5/20 at 13:29;  Status DC


Diphenhydramine HCl (Benadryl) 25 mg 1X PRN  PRN IV ITCHING;  Start 4/5/20 at 

12:30;  Stop 4/6/20 at 12:29;  Status DC


Diphenhydramine HCl (Benadryl) 25 mg 1X PRN  PRN IV ITCHING;  Start 4/5/20 at 

12:30;  Stop 4/6/20 at 12:29;  Status DC


Info (PHARMACY MONITORING -- do not chart) 1 each PRN DAILY  PRN MC SEE 

COMMENTS;  Start 4/5/20 at 12:30;  Status Cancel


Bupivacaine HCl/ Epinephrine Bitart (Sensorcain-Epi 0.5%-1:[id number] Mpf) 30 ml 

STK-MED ONCE .ROUTE  Last administered on 4/6/20at 11:44;  Start 4/6/20 at 

11:00;  Stop 4/6/20 at 11:01;  Status DC


Cellulose (Surgicel Fibrillar 1x2) 1 each STK-MED ONCE .ROUTE ;  Start 4/6/20 at

11:00;  Stop 4/6/20 at 11:01;  Status DC


Sodium Chloride 90 meq/Potassium Chloride 15 meq/ Potassium Phosphate 10 mmol/ 

Magnesium Sulfate 12 meq/Calcium Gluconate 15 meq/ Multivitamins 10 ml/Chromium/

Copper/Manganese/ Seleni/Zn 0.5 ml/ Insulin Human Regular 25 unit/ Total 

Parenteral Nutrition/Amino Acids/Dextrose/ Fat Emulsion Intravenous 1,400 ml @  

58.333 mls/ hr TPN  CONT IV  Last administered on 4/6/20at 22:24;  Start 4/6/20 

at 22:00;  Stop 4/7/20 at 21:59;  Status DC


Propofol 20 ml @ As Directed STK-MED ONCE IV ;  Start 4/6/20 at 11:07;  Stop 

4/6/20 at 11:07;  Status DC


Cellulose (Surgicel Hemostat 4x8) 1 each STK-MED ONCE .ROUTE  Last administered 

on 4/6/20at 11:44;  Start 4/6/20 at 11:55;  Stop 4/6/20 at 11:56;  Status DC


Sevoflurane (Ultane) 60 ml STK-MED ONCE IH ;  Start 4/6/20 at 12:46;  Stop 

4/6/20 at 12:46;  Status DC


Sodium Chloride 1,000 ml @  1,000 mls/hr Q1H PRN IV hypotension;  Start 4/6/20 

at 13:51;  Stop 4/6/20 at 19:50;  Status DC


Albumin Human 200 ml @  200 mls/hr 1X PRN  PRN IV Hypotension Last administered 

on 4/6/20at 14:51;  Start 4/6/20 at 14:00;  Stop 4/6/20 at 19:59;  Status DC


Diphenhydramine HCl (Benadryl) 25 mg 1X PRN  PRN IV ITCHING;  Start 4/6/20 at 

14:00;  Stop 4/7/20 at 13:59;  Status DC


Diphenhydramine HCl (Benadryl) 25 mg 1X PRN  PRN IV ITCHING;  Start 4/6/20 at 

14:00;  Stop 4/7/20 at 13:59;  Status DC


Sodium Chloride 1,000 ml @  400 mls/hr Q2H30M PRN IV PATENCY;  Start 4/6/20 at 

13:51;  Stop 4/7/20 at 01:50;  Status DC


Info (PHARMACY MONITORING -- do not chart) 1 each PRN DAILY  PRN MC SEE 

COMMENTS;  Start 4/6/20 at 14:00;  Stop 4/9/20 at 08:16;  Status DC


Heparin Sodium (Porcine) (Hep Lock Adult) 500 unit STK-MED ONCE IVP ;  Start 

4/7/20 at 09:29;  Stop 4/7/20 at 09:30;  Status DC


Sodium Chloride 1,000 ml @  1,000 mls/hr Q1H PRN IV hypotension;  Start 4/7/20 

at 10:43;  Stop 4/7/20 at 16:42;  Status DC


Sodium Chloride 1,000 ml @  400 mls/hr Q2H30M PRN IV PATENCY;  Start 4/7/20 at 

10:43;  Stop 4/7/20 at 22:42;  Status DC


Info (PHARMACY MONITORING -- do not chart) 1 each PRN DAILY  PRN MC SEE 

COMMENTS;  Start 4/7/20 at 10:45;  Status UNV


Info (PHARMACY MONITORING -- do not chart) 1 each PRN DAILY  PRN MC SEE 

COMMENTS;  Start 4/7/20 at 10:45;  Status UNV


Sodium Chloride 90 meq/Potassium Chloride 15 meq/ Magnesium Sulfate 12 

meq/Calcium Gluconate 15 meq/ Multivitamins 10 ml/Chromium/ Copper/Manganese/ 

Seleni/Zn 0.5 ml/ Insulin Human Regular 25 unit/ Total Parenteral 

Nutrition/Amino Acids/Dextrose/ Fat Emulsion Intravenous 1,400 ml @  58.333 mls/

hr TPN  CONT IV  Last administered on 4/7/20at 22:13;  Start 4/7/20 at 22:00;  

Stop 4/8/20 at 21:59;  Status DC


Sodium Chloride 1,000 ml @  1,000 mls/hr Q1H PRN IV hypotension;  Start 4/8/20 

at 07:50;  Stop 4/8/20 at 13:49;  Status DC


Albumin Human 200 ml @  200 mls/hr 1X  ONCE IV ;  Start 4/8/20 at 08:00;  Stop 

4/8/20 at 08:53;  Status DC


Diphenhydramine HCl (Benadryl) 25 mg 1X PRN  PRN IV ITCHING;  Start 4/8/20 at 

08:00;  Stop 4/9/20 at 07:59;  Status DC


Diphenhydramine HCl (Benadryl) 25 mg 1X PRN  PRN IV ITCHING;  Start 4/8/20 at 

08:00;  Stop 4/9/20 at 07:59;  Status DC


Info (PHARMACY MONITORING -- do not chart) 1 each PRN DAILY  PRN MC SEE 

COMMENTS;  Start 4/8/20 at 08:00;  Stop 4/9/20 at 08:16;  Status DC


Albumin Human 50 ml @ 50 mls/hr 1X  ONCE IV ;  Start 4/8/20 at 08:53;  Stop 

4/8/20 at 08:56;  Status DC


Albumin Human 200 ml @  50 mls/hr PRN 1X  PRN IV HYPOTENSION Last administered 

on 4/14/20at 11:54;  Start 4/8/20 at 09:00;  Stop 5/21/20 at 11:14;  Status DC


Meropenem 500 mg/ Sodium Chloride 50 ml @  100 mls/hr Q12H IV  Last administered

on 4/28/20at 10:45;  Start 4/8/20 at 10:00;  Stop 4/28/20 at 12:37;  Status DC


Sodium Chloride 90 meq/Magnesium Sulfate 12 meq/ Calcium Gluconate 15 meq/ 

Multivitamins 10 ml/Chromium/ Copper/Manganese/ Seleni/Zn 0.5 ml/ Insulin Human 

Regular 25 unit/ Total Parenteral Nutrition/Amino Acids/Dextrose/ Fat Emulsion 

Intravenous 1,400 ml @  58.333 mls/ hr TPN  CONT IV  Last administered on 

4/8/20at 21:41;  Start 4/8/20 at 22:00;  Stop 4/9/20 at 21:59;  Status DC


Sodium Chloride 1,000 ml @  1,000 mls/hr Q1H PRN IV hypotension;  Start 4/9/20 

at 07:58;  Stop 4/9/20 at 13:57;  Status DC


Albumin Human 200 ml @  200 mls/hr 1X PRN  PRN IV Hypotension Last administered 

on 4/9/20at 09:30;  Start 4/9/20 at 08:00;  Stop 4/9/20 at 13:59;  Status DC


Sodium Chloride 1,000 ml @  400 mls/hr Q2H30M PRN IV PATENCY;  Start 4/9/20 at 

07:58;  Stop 4/9/20 at 19:57;  Status DC


Info (PHARMACY MONITORING -- do not chart) 1 each PRN DAILY  PRN MC SEE 

COMMENTS;  Start 4/9/20 at 08:00;  Status Cancel


Info (PHARMACY MONITORING -- do not chart) 1 each PRN DAILY  PRN MC SEE 

COMMENTS;  Start 4/9/20 at 08:15;  Status UNV


Sodium Chloride 90 meq/Potassium Phosphate 5 mmol/ Magnesium Sulfate 12 

meq/Calcium Gluconate 15 meq/ Multivitamins 10 ml/Chromium/ Copper/Manganese/ 

Seleni/Zn 0.5 ml/ Insulin Human Regular 30 unit/ Total Parenteral 

Nutrition/Amino Acids/Dextrose/ Fat Emulsion Intravenous 1,400 ml @  58.333 mls/

hr TPN  CONT IV  Last administered on 4/9/20at 22:08;  Start 4/9/20 at 22:00;  

Stop 4/10/20 at 21:59;  Status DC


Linezolid/Dextrose 300 ml @  300 mls/hr Q12HR IV  Last administered on 4/20/20at

20:40;  Start 4/10/20 at 11:00;  Stop 4/21/20 at 08:10;  Status DC


Sodium Chloride 90 meq/Potassium Phosphate 15 mmol/ Magnesium Sulfate 12 

meq/Calcium Gluconate 15 meq/ Multivitamins 10 ml/Chromium/ Copper/Manganese/ 

Seleni/Zn 0.5 ml/ Insulin Human Regular 30 unit/ Total Parenteral Nutr

ition/Amino Acids/Dextrose/ Fat Emulsion Intravenous 1,400 ml @  58.333 mls/ hr 

TPN  CONT IV  Last administered on 4/10/20at 21:49;  Start 4/10/20 at 22:00;  

Stop 4/11/20 at 21:59;  Status DC


Sodium Chloride 90 meq/Potassium Phosphate 15 mmol/ Magnesium Sulfate 12 

meq/Calcium Gluconate 15 meq/ Multivitamins 10 ml/Chromium/ Copper/Manganese/ 

Seleni/Zn 0.5 ml/ Insulin Human Regular 40 unit/ Total Parenteral 

Nutrition/Amino Acids/Dextrose/ Fat Emulsion Intravenous 1,400 ml @  58.333 mls/

hr TPN  CONT IV  Last administered on 4/11/20at 21:21;  Start 4/11/20 at 22:00; 

Stop 4/12/20 at 21:59;  Status DC


Sodium Chloride 1,000 ml @  1,000 mls/hr Q1H PRN IV hypotension;  Start 4/11/20 

at 13:26;  Stop 4/11/20 at 19:25;  Status DC


Albumin Human 200 ml @  200 mls/hr 1X PRN  PRN IV Hypotension Last administered 

on 4/11/20at 15:00;  Start 4/11/20 at 13:30;  Stop 4/11/20 at 19:29;  Status DC


Sodium Chloride (Normal Saline Flush) 10 ml 1X PRN  PRN IV AP catheter pack;  

Start 4/11/20 at 13:30;  Stop 4/12/20 at 13:29;  Status DC


Sodium Chloride (Normal Saline Flush) 10 ml 1X PRN  PRN IV  catheter pack;  

Start 4/11/20 at 13:30;  Stop 4/12/20 at 13:29;  Status DC


Sodium Chloride 1,000 ml @  400 mls/hr Q2H30M PRN IV PATENCY;  Start 4/11/20 at 

13:26;  Stop 4/12/20 at 01:25;  Status DC


Info (PHARMACY MONITORING -- do not chart) 1 each PRN DAILY  PRN MC SEE 

COMMENTS;  Start 4/11/20 at 13:30;  Stop 4/11/20 at 13:33;  Status DC


Info (PHARMACY MONITORING -- do not chart) 1 each PRN DAILY  PRN MC SEE 

COMMENTS;  Start 4/11/20 at 13:30;  Stop 4/11/20 at 13:34;  Status DC


Sodium Chloride 90 meq/Potassium Phosphate 19 mmol/ Magnesium Sulfate 12 

meq/Calcium Gluconate 15 meq/ Multivitamins 10 ml/Chromium/ Copper/Manganese/ 

Seleni/Zn 0.5 ml/ Insulin Human Regular 40 unit/ Total Parenteral Nutri

tion/Amino Acids/Dextrose/ Fat Emulsion Intravenous 1,400 ml @  58.333 mls/ hr 

TPN  CONT IV  Last administered on 4/12/20at 21:54;  Start 4/12/20 at 22:00;  

Stop 4/13/20 at 21:59;  Status DC


Sodium Chloride 1,000 ml @  1,000 mls/hr Q1H PRN IV hypotension;  Start 4/13/20 

at 09:35;  Stop 4/13/20 at 15:34;  Status DC


Albumin Human 200 ml @  200 mls/hr 1X PRN  PRN IV Hypotension;  Start 4/13/20 at

09:45;  Stop 4/13/20 at 15:44;  Status DC


Diphenhydramine HCl (Benadryl) 25 mg 1X PRN  PRN IV ITCHING;  Start 4/13/20 at 

09:45;  Stop 4/14/20 at 09:44;  Status DC


Diphenhydramine HCl (Benadryl) 25 mg 1X PRN  PRN IV ITCHING;  Start 4/13/20 at 0

9:45;  Stop 4/14/20 at 09:44;  Status DC


Sodium Chloride 1,000 ml @  400 mls/hr Q2H30M PRN IV PATENCY;  Start 4/13/20 at 

09:35;  Stop 4/13/20 at 21:34;  Status DC


Info (PHARMACY MONITORING -- do not chart) 1 each PRN DAILY  PRN MC SEE 

COMMENTS;  Start 4/13/20 at 09:45;  Status Cancel


Sodium Chloride 100 meq/Potassium Phosphate 19 mmol/ Magnesium Sulfate 12 

meq/Calcium Gluconate 15 meq/ Multivitamins 10 ml/Chromium/ Copper/Manganese/ 

Seleni/Zn 0.5 ml/ Insulin Human Regular 40 unit/ Potassium Chloride 20 meq/ 

Total Parenteral Nutrition/Amino Acids/Dextrose/ Fat Emulsion Intravenous 1,400 

ml @  58.333 mls/ hr TPN  CONT IV  Last administered on 4/13/20at 22:02;  Start 

4/13/20 at 22:00;  Stop 4/14/20 at 21:59;  Status DC


Furosemide (Lasix) 40 mg 1X  ONCE IVP  Last administered on 4/13/20at 14:39;  

Start 4/13/20 at 14:30;  Stop 4/13/20 at 14:31;  Status DC


Metronidazole 100 ml @  100 mls/hr Q8HRS IV  Last administered on 4/21/20at 

06:04;  Start 4/14/20 at 10:00;  Stop 4/21/20 at 08:10;  Status DC


Sodium Chloride 1,000 ml @  1,000 mls/hr Q1H PRN IV hypotension;  Start 4/14/20 

at 08:00;  Stop 4/14/20 at 13:59;  Status DC


Albumin Human 200 ml @  200 mls/hr 1X PRN  PRN IV Hypotension;  Start 4/14/20 at

08:00;  Stop 4/14/20 at 13:59;  Status DC


Sodium Chloride 1,000 ml @  400 mls/hr Q2H30M PRN IV PATENCY;  Start 4/14/20 at 

08:00;  Stop 4/14/20 at 19:59;  Status DC


Info (PHARMACY MONITORING -- do not chart) 1 each PRN DAILY  PRN MC SEE 

COMMENTS;  Start 4/14/20 at 11:30;  Status UNV


Info (PHARMACY MONITORING -- do not chart) 1 each PRN DAILY  PRN MC SEE 

COMMENTS;  Start 4/14/20 at 11:30;  Stop 4/16/20 at 12:13;  Status DC


Sodium Chloride 100 meq/Potassium Phosphate 19 mmol/ Magnesium Sulfate 12 

meq/Calcium Gluconate 15 meq/ Multivitamins 10 ml/Chromium/ Copper/Manganese/ 

Seleni/Zn 0.5 ml/ Insulin Human Regular 40 unit/ Potassium Chloride 20 meq/ 

Total Parenteral Nutrition/Amino Acids/Dextrose/ Fat Emulsion Intravenous 1,400 

ml @  58.333 mls/ hr TPN  CONT IV  Last administered on 4/14/20at 21:52;  Start 

4/14/20 at 22:00;  Stop 4/15/20 at 21:59;  Status DC


Sodium Chloride (Normal Saline Flush) 10 ml QSHIFT  PRN IV AFTER MEDS AND BLOOD 

DRAWS;  Start 4/14/20 at 15:00;  Stop 5/12/20 at 11:27;  Status DC


Sodium Chloride (Normal Saline Flush) 10 ml PRN Q5MIN  PRN IV AFTER MEDS AND BL

OOD DRAWS;  Start 4/14/20 at 15:00


Sodium Chloride (Normal Saline Flush) 20 ml PRN Q5MIN  PRN IV AFTER MEDS AND 

BLOOD DRAWS;  Start 4/14/20 at 15:00


Sodium Chloride 100 meq/Potassium Phosphate 19 mmol/ Magnesium Sulfate 12 

meq/Calcium Gluconate 15 meq/ Multivitamins 10 ml/Chromium/ Copper/Manganese/ 

Seleni/Zn 0.5 ml/ Insulin Human Regular 40 unit/ Potassium Chloride 20 meq/ 

Total Parenteral Nutrition/Amino Acids/Dextrose/ Fat Emulsion Intravenous 1,400 

ml @  58.333 mls/ hr TPN  CONT IV  Last administered on 4/15/20at 21:20;  Start 

4/15/20 at 22:00;  Stop 4/16/20 at 21:59;  Status DC


Lidocaine HCl (Buffered Lidocaine 1%) 3 ml STK-MED ONCE .ROUTE ;  Start 4/15/20 

at 13:16;  Stop 4/15/20 at 13:16;  Status DC


Lidocaine HCl (Buffered Lidocaine 1%) 6 ml 1X  ONCE INJ  Last administered on 

4/15/20at 13:45;  Start 4/15/20 at 13:30;  Stop 4/15/20 at 13:31;  Status DC


Albumin Human 100 ml @  100 mls/hr 1X  ONCE IV  Last administered on 4/15/20at 

15:41;  Start 4/15/20 at 15:00;  Stop 4/15/20 at 15:59;  Status DC


Albumin Human 50 ml @ 50 mls/hr 1X  ONCE IV  Last administered on 4/15/20at 

15:00;  Start 4/15/20 at 15:00;  Stop 4/15/20 at 15:59;  Status DC


Info (PHARMACY MONITORING -- do not chart) 1 each PRN DAILY  PRN MC SEE 

COMMENTS;  Start 4/16/20 at 11:30;  Status Cancel


Info (PHARMACY MONITORING -- do not chart) 1 each PRN DAILY  PRN MC SEE 

COMMENTS;  Start 4/16/20 at 11:30;  Status UNV


Sodium Chloride 100 meq/Potassium Phosphate 10 mmol/ Magnesium Sulfate 12 

meq/Calcium Gluconate 15 meq/ Multivitamins 10 ml/Chromium/ Copper/Manganese/ 

Seleni/Zn 0.5 ml/ Insulin Human Regular 35 unit/ Potassium Chloride 20 meq/ 

Total Parenteral Nutrition/Amino Acids/Dextrose/ Fat Emulsion Intravenous 1,400 

ml @  58.333 mls/ hr TPN  CONT IV  Last administered on 4/16/20at 22:10;  Start 

4/16/20 at 22:00;  Stop 4/17/20 at 21:59;  Status DC


Sodium Chloride 100 meq/Potassium Phosphate 5 mmol/ Magnesium Sulfate 12 meq/Ca

lcium Gluconate 15 meq/ Multivitamins 10 ml/Chromium/ Copper/Manganese/ 

Seleni/Zn 0.5 ml/ Insulin Human Regular 35 unit/ Potassium Chloride 20 meq/ 

Total Parenteral Nutrition/Amino Acids/Dextrose/ Fat Emulsion Intravenous 1,400 

ml @  58.333 mls/ hr TPN  CONT IV  Last administered on 4/17/20at 22:59;  Start 

4/17/20 at 22:00;  Stop 4/18/20 at 21:59;  Status DC


Sodium Chloride 1,000 ml @  1,000 mls/hr Q1H PRN IV hypotension;  Start 4/18/20 

at 08:27;  Stop 4/18/20 at 14:26;  Status DC


Albumin Human 200 ml @  200 mls/hr 1X PRN  PRN IV Hypotension Last administered 

on 4/18/20at 09:18;  Start 4/18/20 at 08:30;  Stop 4/18/20 at 14:29;  Status DC


Sodium Chloride 1,000 ml @  400 mls/hr Q2H30M PRN IV PATENCY;  Start 4/18/20 at 

08:27;  Stop 4/18/20 at 20:26;  Status DC


Info (PHARMACY MONITORING -- do not chart) 1 each PRN DAILY  PRN MC SEE 

COMMENTS;  Start 4/18/20 at 08:30;  Status Cancel


Info (PHARMACY MONITORING -- do not chart) 1 each PRN DAILY  PRN MC SEE 

COMMENTS;  Start 4/18/20 at 08:30;  Stop 4/26/20 at 13:10;  Status DC


Sodium Chloride 100 meq/Potassium Chloride 40 meq/ Magnesium Sulfate 15 

meq/Calcium Gluconate 15 meq/ Multivitamins 10 ml/Chromium/ Copper/Manganese/ 

Seleni/Zn 0.5 ml/ Insulin Human Regular 35 unit/ Total Parenteral 

Nutrition/Amino Acids/Dextrose/ Fat Emulsion Intravenous 1,400 ml @  58.333 mls/

hr TPN  CONT IV  Last administered on 4/18/20at 22:00;  Start 4/18/20 at 22:00; 

Stop 4/19/20 at 21:59;  Status DC


Potassium Chloride/Water 100 ml @  100 mls/hr 1X  ONCE IV  Last administered on 

4/18/20at 17:28;  Start 4/18/20 at 14:45;  Stop 4/18/20 at 15:44;  Status DC


Sodium Chloride 100 meq/Potassium Chloride 40 meq/ Magnesium Sulfate 15 

meq/Calcium Gluconate 15 meq/ Multivitamins 10 ml/Chromium/ Copper/Manganese/ 

Seleni/Zn 0.5 ml/ Insulin Human Regular 35 unit/ Total Parenteral 

Nutrition/Amino Acids/Dextrose/ Fat Emulsion Intravenous 1,400 ml @  58.333 mls/

hr TPN  CONT IV  Last administered on 4/19/20at 22:46;  Start 4/19/20 at 22:00; 

Stop 4/20/20 at 21:59;  Status DC


Sodium Chloride 100 meq/Potassium Chloride 40 meq/ Magnesium Sulfate 20 

meq/Calcium Gluconate 15 meq/ Multivitamins 10 ml/Chromium/ Copper/Manganese/ 

Seleni/Zn 0.5 ml/ Insulin Human Regular 35 unit/ Total Parenteral 

Nutrition/Amino Acids/Dextrose/ Fat Emulsion Intravenous 1,400 ml @  58.333 mls/

hr TPN  CONT IV  Last administered on 4/20/20at 22:31;  Start 4/20/20 at 22:00; 

Stop 4/21/20 at 21:59;  Status DC


Fentanyl Citrate (Fentanyl 2ml Vial) 50 mcg PRN Q2HR  PRN IVP PAIN Last 

administered on 4/27/20at 13:32;  Start 4/20/20 at 21:00;  Stop 4/28/20 at 

12:53;  Status DC


Fentanyl Citrate (Fentanyl 2ml Vial) 25 mcg PRN Q2HR  PRN IVP PAIN;  Start 

4/20/20 at 21:00;  Stop 4/28/20 at 12:54;  Status DC


Enoxaparin Sodium (Lovenox 100mg Syringe) 100 mg Q12HR SQ ;  Start 4/21/20 at 

21:00;  Status UNV


Amino Acids/ Glycerin/ Electrolytes 1,000 ml @  75 mls/hr [id number] IV ;  Start 

4/20/20 at 21:15;  Status UNV


Sodium Chloride 1,000 ml @  1,000 mls/hr Q1H PRN IV hypotension;  Start 4/21/20 

at 07:56;  Stop 4/21/20 at 13:55;  Status DC


Albumin Human 200 ml @  200 mls/hr 1X PRN  PRN IV Hypotension Last administered 

on 4/21/20at 08:40;  Start 4/21/20 at 08:00;  Stop 4/21/20 at 13:59;  Status DC


Sodium Chloride 1,000 ml @  400 mls/hr Q2H30M PRN IV PATENCY;  Start 4/21/20 at 

07:56;  Stop 4/21/20 at 19:55;  Status DC


Info (PHARMACY MONITORING -- do not chart) 1 each PRN DAILY  PRN MC SEE 

COMMENTS;  Start 4/21/20 at 08:00;  Status UNV


Info (PHARMACY MONITORING -- do not chart) 1 each PRN DAILY  PRN MC SEE 

COMMENTS;  Start 4/21/20 at 08:00;  Status UNV


Daptomycin 430 mg/ Sodium Chloride 50 ml @  100 mls/hr Q24H IV  Last 

administered on 4/21/20at 12:35;  Start 4/21/20 at 09:00;  Stop 4/21/20 at 

12:49;  Status DC


Sodium Chloride 100 meq/Potassium Chloride 40 meq/ Magnesium Sulfate 20 

meq/Calcium Gluconate 15 meq/ Multivitamins 10 ml/Chromium/ Copper/Manganese/ 

Seleni/Zn 0.5 ml/ Insulin Human Regular 35 unit/ Total Parenteral 

Nutrition/Amino Acids/Dextrose/ Fat Emulsion Intravenous 1,400 ml @  58.333 mls/

hr TPN  CONT IV  Last administered on 4/21/20at 21:26;  Start 4/21/20 at 22:00; 

Stop 4/22/20 at 21:59;  Status DC


Daptomycin 430 mg/ Sodium Chloride 50 ml @  100 mls/hr Q48H IV ;  Start 4/23/20 

at 09:00;  Stop 4/22/20 at 11:55;  Status DC


Sodium Chloride 100 meq/Potassium Chloride 40 meq/ Magnesium Sulfate 20 

meq/Calcium Gluconate 15 meq/ Multivitamins 10 ml/Chromium/ Copper/Manganese/ 

Seleni/Zn 0.5 ml/ Insulin Human Regular 35 unit/ Total Parenteral 

Nutrition/Amino Acids/Dextrose/ Fat Emulsion Intravenous 1,400 ml @  58.333 mls/

hr TPN  CONT IV  Last administered on 4/22/20at 22:27;  Start 4/22/20 at 22:00; 

Stop 4/23/20 at 21:59;  Status DC


Daptomycin 430 mg/ Sodium Chloride 50 ml @  100 mls/hr Q24H IV  Last 

administered on 4/24/20at 15:07;  Start 4/22/20 at 13:00;  Stop 4/25/20 at 

13:15;  Status DC


Sodium Chloride 100 meq/Potassium Chloride 40 meq/ Magnesium Sulfate 20 

meq/Calcium Gluconate 10 meq/ Multivitamins 10 ml/Chromium/ Copper/Manganese/ S

haley/Zn 0.5 ml/ Insulin Human Regular 35 unit/ Total Parenteral Nutrition/Amino

Acids/Dextrose/ Fat Emulsion Intravenous 1,400 ml @  58.333 mls/ hr TPN  CONT IV

 Last administered on 4/24/20at 00:06;  Start 4/23/20 at 22:00;  Stop 4/24/20 at

21:59;  Status DC


Alteplase, Recombinant (Cathflo For Central Catheter Clearance) 1 mg 1X  ONCE 

INT CAT  Last administered on 4/24/20at 11:44;  Start 4/24/20 at 10:45;  Stop 

4/24/20 at 10:46;  Status DC


Ondansetron HCl (Zofran) 4 mg PRN Q6HRS  PRN IV NAUSEA/VOMITING;  Start 4/27/20 

at 07:00;  Stop 4/28/20 at 06:59;  Status DC


Fentanyl Citrate (Fentanyl 2ml Vial) 25 mcg PRN Q5MIN  PRN IV MILD PAIN 1-3;  

Start 4/27/20 at 07:00;  Stop 4/28/20 at 06:59;  Status DC


Fentanyl Citrate (Fentanyl 2ml Vial) 50 mcg PRN Q5MIN  PRN IV MODERATE TO SEVERE

PAIN Last administered on 4/27/20at 10:17;  Start 4/27/20 at 07:00;  Stop 

4/28/20 at 06:59;  Status DC


Ringer's Solution 1,000 ml @  30 mls/hr Q24H IV ;  Start 4/27/20 at 07:00;  Stop

4/27/20 at 18:59;  Status DC


Lidocaine HCl (Xylocaine-Mpf 1% 2ml Vial) 2 ml PRN 1X  PRN ID PRIOR TO IV START;

 Start 4/27/20 at 07:00;  Stop 4/28/20 at 06:59;  Status DC


Prochlorperazine Edisylate (Compazine) 5 mg PACU PRN  PRN IV NAUSEA, MRX1;  

Start 4/27/20 at 07:00;  Stop 4/28/20 at 06:59;  Status DC


Sodium Acetate 50 meq/Potassium Acetate 55 meq/ Magnesium Sulfate 20 meq/Calcium

Gluconate 10 meq/ Multivitamins 10 ml/Chromium/ Copper/Manganese/ Seleni/Zn 0.5 

ml/ Insulin Human Regular 35 unit/ Total Parenteral Nutrition/Amino 

Acids/Dextrose/ Fat Emulsion Intravenous 1,400 ml @  58.333 mls/ hr TPN  CONT IV

;  Start 4/24/20 at 22:00;  Stop 4/24/20 at 14:15;  Status DC


Sodium Acetate 50 meq/Potassium Acetate 55 meq/ Magnesium Sulfate 20 meq/Calcium

Gluconate 10 meq/ Multivitamins 10 ml/Chromium/ Copper/Manganese/ Seleni/Zn 0.5 

ml/ Insulin Human Regular 35 unit/ Total Parenteral Nutrition/Amino 

Acids/Dextrose/ Fat Emulsion Intravenous 1,800 ml @  75 mls/hr TPN  CONT IV  

Last administered on 4/24/20at 22:38;  Start 4/24/20 at 22:00;  Stop 4/25/20 at 

21:59;  Status DC


Sodium Chloride 1,000 ml @  1,000 mls/hr Q1H PRN IV hypotension;  Start 4/24/20 

at 15:31;  Stop 4/24/20 at 21:30;  Status DC


Diphenhydramine HCl (Benadryl) 25 mg 1X PRN  PRN IV ITCHING;  Start 4/24/20 at 

15:45;  Stop 4/25/20 at 15:44;  Status DC


Diphenhydramine HCl (Benadryl) 25 mg 1X PRN  PRN IV ITCHING;  Start 4/24/20 at 

15:45;  Stop 4/25/20 at 15:44;  Status DC


Sodium Chloride 1,000 ml @  400 mls/hr Q2H30M PRN IV PATENCY;  Start 4/24/20 at 

15:31;  Stop 4/25/20 at 03:30;  Status DC


Info (PHARMACY MONITORING -- do not chart) 1 each PRN DAILY  PRN MC SEE 

COMMENTS;  Start 4/24/20 at 15:45;  Stop 5/26/20 at 14:14;  Status DC


Sodium Acetate 50 meq/Potassium Acetate 55 meq/ Magnesium Sulfate 20 meq/Calcium

Gluconate 10 meq/ Multivitamins 10 ml/Chromium/ Copper/Manganese/ Seleni/Zn 0.5 

ml/ Insulin Human Regular 35 unit/ Total Parenteral Nutrition/Amino 

Acids/Dextrose/ Fat Emulsion Intravenous 1,800 ml @  75 mls/hr TPN  CONT IV  

Last administered on 4/25/20at 22:03;  Start 4/25/20 at 22:00;  Stop 4/26/20 at 

21:59;  Status DC


Daptomycin 430 mg/ Sodium Chloride 50 ml @  100 mls/hr Q24H IV  Last 

administered on 4/30/20at 13:00;  Start 4/25/20 at 13:00;  Stop 4/30/20 at 

20:58;  Status DC


Heparin Sodium (Porcine) 1000 unit/Sodium Chloride 1,001 ml @  1,001 mls/hr 1X  

ONCE IRR ;  Start 4/27/20 at 06:00;  Stop 4/27/20 at 06:59;  Status DC


Potassium Acetate 55 meq/Magnesium Sulfate 20 meq/ Calcium Gluconate 10 meq/ 

Multivitamins 10 ml/Chromium/ Copper/Manganese/ Seleni/Zn 0.5 ml/ Insulin Human 

Regular 35 unit/ Total Parenteral Nutrition/Amino Acids/Dextrose/ Fat Emulsion 

Intravenous 1,920 ml @  80 mls/hr TPN  CONT IV  Last administered on 4/26/20at 

22:10;  Start 4/26/20 at 22:00;  Stop 4/27/20 at 21:59;  Status DC


Dexamethasone Sodium Phosphate (Decadron) 4 mg STK-MED ONCE .ROUTE ;  Start 

4/27/20 at 10:56;  Stop 4/27/20 at 10:57;  Status DC


Ondansetron HCl (Zofran) 4 mg STK-MED ONCE .ROUTE ;  Start 4/27/20 at 10:56;  

Stop 4/27/20 at 10:57;  Status DC


Rocuronium Bromide (Zemuron) 50 mg STK-MED ONCE .ROUTE ;  Start 4/27/20 at 

10:56;  Stop 4/27/20 at 10:57;  Status DC


Fentanyl Citrate (Fentanyl 2ml Vial) 100 mcg STK-MED ONCE .ROUTE ;  Start 

4/27/20 at 10:56;  Stop 4/27/20 at 10:57;  Status DC


Bupivacaine HCl/ Epinephrine Bitart (Sensorcain-Epi 0.5%-1:825754 Mpf) 30 ml 

STK-MED ONCE .ROUTE  Last administered on 4/27/20at 12:01;  Start 4/27/20 at 

10:58;  Stop 4/27/20 at 10:58;  Status DC


Cellulose (Surgicel Hemostat 2x14) 1 each STK-MED ONCE .ROUTE ;  Start 4/27/20 

at 10:58;  Stop 4/27/20 at 10:59;  Status DC


Iohexol (Omnipaque 300 Mg/ml) 50 ml STK-MED ONCE .ROUTE ;  Start 4/27/20 at 

10:58;  Stop 4/27/20 at 10:59;  Status DC


Cellulose (Surgicel Hemostat 4x8) 1 each STK-MED ONCE .ROUTE ;  Start 4/27/20 at

10:58;  Stop 4/27/20 at 10:59;  Status DC


Bisacodyl (Dulcolax Supp) 10 mg STK-MED ONCE .ROUTE ;  Start 4/27/20 at 10:59;  

Stop 4/27/20 at 10:59;  Status DC


Heparin Sodium (Porcine) 1000 unit/Sodium Chloride 1,001 ml @  1,001 mls/hr 1X  

ONCE IRR ;  Start 4/27/20 at 12:00;  Stop 4/27/20 at 12:59;  Status DC


Propofol 20 ml @ As Directed STK-MED ONCE IV ;  Start 4/27/20 at 11:05;  Stop 

4/27/20 at 11:05;  Status DC


Sevoflurane (Ultane) 90 ml STK-MED ONCE IH ;  Start 4/27/20 at 11:05;  Stop 

4/27/20 at 11:05;  Status DC


Sevoflurane (Ultane) 60 ml STK-MED ONCE IH ;  Start 4/27/20 at 12:26;  Stop 

4/27/20 at 12:27;  Status DC


Propofol 20 ml @ As Directed STK-MED ONCE IV ;  Start 4/27/20 at 12:26;  Stop 

4/27/20 at 12:27;  Status DC


Phenylephrine HCl (PHENYLEPHRINE in 0.9% NACL PF) 1 mg STK-MED ONCE IV ;  Start 

4/27/20 at 12:34;  Stop 4/27/20 at 12:34;  Status DC


Heparin Sodium (Porcine) (Heparin Sodium) 5,000 unit Q12HR SQ  Last administered

on 5/6/20at 20:57;  Start 4/27/20 at 21:00;  Stop 5/7/20 at 09:59;  Status DC


Sodium Chloride (Normal Saline Flush) 3 ml QSHIFT  PRN IV AFTER MEDS AND BLOOD 

DRAWS;  Start 4/27/20 at 13:45;  Status Cancel


Naloxone HCl (Narcan) 0.4 mg PRN Q2MIN  PRN IV SEE INSTRUCTIONS Last administer

ed on 6/6/20at 15:15;  Start 4/27/20 at 13:45;  Stop 7/1/20 at 16:00;  Status DC


Sodium Chloride 1,000 ml @  25 mls/hr Q24H IV  Last administered on 5/26/20at 

13:37;  Start 4/27/20 at 13:37;  Stop 5/29/20 at 13:09;  Status DC


Naloxone HCl (Narcan) 0.4 mg PRN Q2MIN  PRN IV SEE INSTRUCTIONS;  Start 4/27/20 

at 14:30;  Status UNV


Sodium Chloride 1,000 ml @  25 mls/hr Q24H IV ;  Start 4/27/20 at 14:30;  Status

UNV


Hydromorphone HCl 30 ml @ 0 mls/hr CONT PRN  PRN IV PER PROTOCOL Last 

administered on 5/2/20at 16:08;  Start 4/27/20 at 14:30;  Stop 5/4/20 at 08:55; 

Status DC


Potassium Acetate 55 meq/Magnesium Sulfate 20 meq/ Calcium Gluconate 10 meq/ 

Multivitamins 10 ml/Chromium/ Copper/Manganese/ Seleni/Zn 0.5 ml/ Insulin Human 

Regular 35 unit/ Total Parenteral Nutrition/Amino Acids/Dextrose/ Fat Emulsion 

Intravenous 1,920 ml @  80 mls/hr TPN  CONT IV  Last administered on 4/27/20at 

22:01;  Start 4/27/20 at 22:00;  Stop 4/28/20 at 21:59;  Status DC


Bumetanide (Bumex) 2 mg [id number] IV  Last administered on 5/1/20at 13:50;  Start 

4/28/20 at 14:00;  Stop 5/2/20 at 14:10;  Status DC


Meropenem 1 gm/ Sodium Chloride 100 ml @  200 mls/hr Q8HRS IV  Last administered

on 5/22/20at 05:53;  Start 4/28/20 at 14:00;  Stop 5/22/20 at 09:31;  Status DC


Potassium Acetate 55 meq/Magnesium Sulfate 20 meq/ Calcium Gluconate 10 meq/ 

Multivitamins 10 ml/Chromium/ Copper/Manganese/ Seleni/Zn 0.5 ml/ Insulin Human 

Regular 35 unit/ Total Parenteral Nutrition/Amino Acids/Dextrose/ Fat Emulsion 

Intravenous 1,920 ml @  80 mls/hr TPN  CONT IV  Last administered on 4/28/20at 

22:02;  Start 4/28/20 at 22:00;  Stop 4/29/20 at 21:59;  Status DC


Hydromorphone HCl (Dilaudid Standard PCA) 12 mg STK-MED ONCE IV ;  Start 4/27/20

at 14:35;  Stop 4/28/20 at 13:53;  Status DC


Artificial Tears (Artificial Tears) 1 drop PRN Q15MIN  PRN OU DRY EYE Last 

administered on 6/23/20at 21:17;  Start 4/29/20 at 05:30


Hydromorphone HCl (Dilaudid Standard PCA) 12 mg STK-MED ONCE IV ;  Start 4/28/20

at 12:05;  Stop 4/29/20 at 09:15;  Status DC


Potassium Acetate 65 meq/Magnesium Sulfate 20 meq/ Calcium Gluconate 10 meq/ 

Multivitamins 10 ml/Chromium/ Copper/Manganese/ Seleni/Zn 0.5 ml/ Insulin Human 

Regular 30 unit/ Total Parenteral Nutrition/Amino Acids/Dextrose/ Fat Emulsion 

Intravenous 1,920 ml @  80 mls/hr TPN  CONT IV  Last administered on 4/29/20at 

22:22;  Start 4/29/20 at 22:00;  Stop 4/30/20 at 21:59;  Status DC


Cyclobenzaprine HCl (Flexeril) 10 mg PRN Q6HRS  PRN PO MUSCLE SPASMS;  Start 

4/30/20 at 10:45


Potassium Acetate 55 meq/Magnesium Sulfate 20 meq/ Calcium Gluconate 10 meq/ 

Multivitamins 10 ml/Chromium/ Copper/Manganese/ Seleni/Zn 0.5 ml/ Insulin Human 

Regular 30 unit/ Total Parenteral Nutrition/Amino Acids/Dextrose/ Fat Emulsion 

Intravenous 1,920 ml @  80 mls/hr TPN  CONT IV  Last administered on 5/1/20at 

01:00;  Start 4/30/20 at 22:00;  Stop 5/1/20 at 21:59;  Status DC


Magnesium Sulfate 50 ml @ 25 mls/hr 1X  ONCE IV  Last administered on 4/30/20at 

17:18;  Start 4/30/20 at 12:45;  Stop 4/30/20 at 14:44;  Status DC


Potassium Chloride/Water 100 ml @  100 mls/hr 1X  ONCE IV  Last administered on 

5/1/20at 11:27;  Start 5/1/20 at 12:00;  Stop 5/1/20 at 12:59;  Status DC


Hydromorphone HCl (Dilaudid Standard PCA) 12 mg STK-MED ONCE IV ;  Start 4/29/20

at 10:50;  Stop 5/1/20 at 11:02;  Status DC


Hydromorphone HCl (Dilaudid Standard PCA) 12 mg STK-MED ONCE IV ;  Start 4/30/20

at 13:47;  Stop 5/1/20 at 11:03;  Status DC


Potassium Acetate 30 meq/Magnesium Sulfate 20 meq/ Calcium Gluconate 10 meq/ 

Multivitamins 10 ml/Chromium/ Copper/Manganese/ Seleni/Zn 0.5 ml/ Insulin Human 

Regular 30 unit/ Potassium Chloride 30 meq/ Total Parenteral Nutrition/Amino 

Acids/Dextrose/ Fat Emulsion Intravenous 1,920 ml @  80 mls/hr TPN  CONT IV  

Last administered on 5/1/20at 22:34;  Start 5/1/20 at 22:00;  Stop 5/2/20 at 

21:59;  Status DC


Potassium Chloride/Water 100 ml @  100 mls/hr Q1H IV  Last administered on 

5/2/20at 13:05;  Start 5/2/20 at 07:00;  Stop 5/2/20 at 10:59;  Status DC


Magnesium Sulfate 50 ml @ 25 mls/hr 1X  ONCE IV  Last administered on 5/2/20at 

10:34;  Start 5/2/20 at 10:30;  Stop 5/2/20 at 12:29;  Status DC


Potassium Chloride 75 meq/ Magnesium Sulfate 20 meq/Calcium Gluconate 10 meq/ 

Multivitamins 10 ml/Chromium/ Copper/Manganese/ Seleni/Zn 0.5 ml/ Insulin Human 

Regular 30 unit/ Total Parenteral Nutrition/Amino Acids/Dextrose/ Fat Emulsion 

Intravenous 1,920 ml @  80 mls/hr TPN  CONT IV  Last administered on 5/2/20at 

21:51;  Start 5/2/20 at 22:00;  Stop 5/3/20 at 22:00;  Status DC


Potassium Chloride 75 meq/ Magnesium Sulfate 20 meq/Calcium Gluconate 10 meq/ 

Multivitamins 10 ml/Chromium/ Copper/Manganese/ Seleni/Zn 0.5 ml/ Insulin Human 

Regular 25 unit/ Total Parenteral Nutrition/Amino Acids/Dextrose/ Fat Emulsion 

Intravenous 1,920 ml @  80 mls/hr TPN  CONT IV  Last administered on 5/3/20at 

22:04;  Start 5/3/20 at 22:00;  Stop 5/4/20 at 21:59;  Status DC


Hydromorphone HCl (Dilaudid) 0.4 mg PRN Q4HRS  PRN IVP PAIN Last administered on

5/4/20at 10:57;  Start 5/4/20 at 09:00;  Stop 5/4/20 at 18:59;  Status DC


Micafungin Sodium 100 mg/Dextrose 100 ml @  100 mls/hr Q24H IV  Last 

administered on 5/26/20at 12:17;  Start 5/4/20 at 11:00;  Stop 5/27/20 at 09:59;

 Status DC


Daptomycin 485 mg/ Sodium Chloride 50 ml @  100 mls/hr Q24H IV  Last 

administered on 5/11/20at 13:10;  Start 5/4/20 at 11:00;  Stop 5/12/20 at 07:44;

 Status DC


Potassium Chloride 75 meq/ Magnesium Sulfate 15 meq/Calcium Gluconate 8 meq/ 

Multivitamins 10 ml/Chromium/ Copper/Manganese/ Seleni/Zn 0.5 ml/ Insulin Human 

Regular 25 unit/ Total Parenteral Nutrition/Amino Acids/Dextrose/ Fat Emulsion 

Intravenous 1,920 ml @  80 mls/hr TPN  CONT IV  Last administered on 5/4/20at 

23:08;  Start 5/4/20 at 22:00;  Stop 5/5/20 at 21:59;  Status DC


Haloperidol Lactate (Haldol Inj) 3 mg 1X  ONCE IVP  Last administered on 

5/4/20at 14:37;  Start 5/4/20 at 14:30;  Stop 5/4/20 at 14:31;  Status DC


Hydromorphone HCl (Dilaudid) 1 mg PRN Q4HRS  PRN IVP PAIN Last administered on 

5/18/20at 06:25;  Start 5/4/20 at 19:00;  Stop 5/18/20 at 17:10;  Status DC


Potassium Chloride 75 meq/ Magnesium Sulfate 15 meq/Calcium Gluconate 8 meq/ 

Multivitamins 10 ml/Chromium/ Copper/Manganese/ Seleni/Zn 0.5 ml/ Insulin Human 

Regular 20 unit/ Total Parenteral Nutrition/Amino Acids/Dextrose/ Fat Emulsion 

Intravenous 1,920 ml @  80 mls/hr TPN  CONT IV  Last administered on 5/5/20at 

22:10;  Start 5/5/20 at 22:00;  Stop 5/6/20 at 21:59;  Status DC


Lidocaine HCl (Buffered Lidocaine 1%) 3 ml STK-MED ONCE .ROUTE ;  Start 5/6/20 

at 11:31;  Stop 5/6/20 at 11:31;  Status DC


Lidocaine HCl (Buffered Lidocaine 1%) 3 ml STK-MED ONCE .ROUTE ;  Start 5/6/20 

at 12:28;  Stop 5/6/20 at 12:29;  Status DC


Lidocaine HCl (Buffered Lidocaine 1%) 6 ml 1X  ONCE INJ  Last administered on 

5/6/20at 12:53;  Start 5/6/20 at 12:45;  Stop 5/6/20 at 12:46;  Status DC


Potassium Chloride 75 meq/ Magnesium Sulfate 15 meq/Calcium Gluconate 8 meq/ 

Multivitamins 10 ml/Chromium/ Copper/Manganese/ Seleni/Zn 0.5 ml/ Insulin Human 

Regular 20 unit/ Total Parenteral Nutrition/Amino Acids/Dextrose/ Fat Emulsion 

Intravenous 1,920 ml @  80 mls/hr TPN  CONT IV  Last administered on 5/6/20at 

22:00;  Start 5/6/20 at 22:00;  Stop 5/7/20 at 21:59;  Status DC


Potassium Chloride 75 meq/ Magnesium Sulfate 15 meq/Calcium Gluconate 8 meq/ 

Multivitamins 10 ml/Chromium/ Copper/Manganese/ Seleni/Zn 0.5 ml/ Insulin Human 

Regular 15 unit/ Total Parenteral Nutrition/Amino Acids/Dextrose/ Fat Emulsion 

Intravenous 1,920 ml @  80 mls/hr TPN  CONT IV  Last administered on 5/7/20at 

22:28;  Start 5/7/20 at 22:00;  Stop 5/8/20 at 21:59;  Status DC


Vecuronium Bromide (Norcuron Bolus) 6 mg PRN Q6HRS  PRN IV VENT ASYNCHRONY;  

Start 5/7/20 at 19:15;  Stop 5/7/20 at 19:35;  Status DC


Bumetanide (Bumex) 2 mg 1X  ONCE IV  Last administered on 5/7/20at 22:09;  Start

5/7/20 at 19:45;  Stop 5/7/20 at 19:46;  Status DC


Lidocaine HCl (Buffered Lidocaine 1%) 3 ml STK-MED ONCE .ROUTE ;  Start 5/8/20 

at 07:59;  Stop 5/8/20 at 07:59;  Status DC


Midazolam HCl (Versed) 5 mg STK-MED ONCE .ROUTE ;  Start 5/8/20 at 08:36;  Stop 

5/8/20 at 08:36;  Status DC


Fentanyl Citrate (Fentanyl 5ml Vial) 250 mcg STK-MED ONCE .ROUTE ;  Start 5/8/20

at 08:36;  Stop 5/8/20 at 08:37;  Status DC


Lidocaine HCl (Buffered Lidocaine 1%) 3 ml 1X  ONCE IJ  Last administered on 

5/8/20at 09:30;  Start 5/8/20 at 09:15;  Stop 5/8/20 at 09:16;  Status DC


Midazolam HCl (Versed) 5 mg 1X  ONCE IV  Last administered on 5/8/20at 09:30;  

Start 5/8/20 at 09:15;  Stop 5/8/20 at 09:16;  Status DC


Fentanyl Citrate (Fentanyl 5ml Vial) 250 mcg 1X  ONCE IV  Last administered on 

5/8/20at 09:30;  Start 5/8/20 at 09:15;  Stop 5/8/20 at 09:16;  Status DC


Bumetanide (Bumex) 2 mg DAILY IV  Last administered on 5/18/20at 08:07;  Start 

5/8/20 at 10:00;  Stop 5/18/20 at 17:15;  Status DC


Potassium Chloride 75 meq/ Magnesium Sulfate 15 meq/ Multivitamins 10 

ml/Chromium/ Copper/Manganese/ Seleni/Zn 0.5 ml/ Insulin Human Regular 15 unit/ 

Total Parenteral Nutrition/Amino Acids/Dextrose/ Fat Emulsion Intravenous 1,920 

ml @  80 mls/hr TPN  CONT IV  Last administered on 5/8/20at 21:59;  Start 5/8/20

at 22:00;  Stop 5/9/20 at 21:59;  Status DC


Metoclopramide HCl (Reglan Vial) 10 mg PRN Q3HRS  PRN IVP NAUSEA/VOMITING-3rd 

choice Last administered on 5/14/20at 04:25;  Start 5/9/20 at 16:45


Potassium Chloride 75 meq/ Magnesium Sulfate 15 meq/ Multivitamins 10 ml/Chrom

ium/ Copper/Manganese/ Seleni/Zn 0.5 ml/ Insulin Human Regular 15 unit/ Total 

Parenteral Nutrition/Amino Acids/Dextrose/ Fat Emulsion Intravenous 1,920 ml @  

80 mls/hr TPN  CONT IV  Last administered on 5/9/20at 22:41;  Start 5/9/20 at 

22:00;  Stop 5/10/20 at 21:59;  Status DC


Magnesium Sulfate 50 ml @ 25 mls/hr 1X  ONCE IV  Last administered on 5/10/20at 

10:44;  Start 5/10/20 at 09:00;  Stop 5/10/20 at 10:59;  Status DC


Potassium Chloride/Water 100 ml @  100 mls/hr 1X  ONCE IV  Last administered on 

5/10/20at 09:37;  Start 5/10/20 at 09:00;  Stop 5/10/20 at 09:59;  Status DC


Duloxetine HCl (Cymbalta) 30 mg DAILY PO  Last administered on 5/11/20at 09:48; 

Start 5/10/20 at 14:00;  Stop 5/13/20 at 10:25;  Status DC


Potassium Chloride 80 meq/ Magnesium Sulfate 20 meq/ Multivitamins 10 

ml/Chromium/ Copper/Manganese/ Seleni/Zn 0.5 ml/ Insulin Human Regular 15 unit/ 

Total Parenteral Nutrition/Amino Acids/Dextrose/ Fat Emulsion Intravenous 1,920 

ml @  80 mls/hr TPN  CONT IV  Last administered on 5/10/20at 21:42;  Start 

5/10/20 at 22:00;  Stop 5/11/20 at 21:59;  Status DC


Potassium Chloride 80 meq/ Magnesium Sulfate 20 meq/ Multivitamins 10 ml

/Chromium/ Copper/Manganese/ Seleni/Zn 0.5 ml/ Insulin Human Regular 15 unit/ 

Total Parenteral Nutrition/Amino Acids/Dextrose/ Fat Emulsion Intravenous 1,920 

ml @  80 mls/hr TPN  CONT IV  Last administered on 5/11/20at 22:20;  Start 

5/11/20 at 22:00;  Stop 5/12/20 at 21:59;  Status DC


Lidocaine HCl (Buffered Lidocaine 1%) 3 ml STK-MED ONCE .ROUTE ;  Start 5/12/20 

at 09:54;  Stop 5/12/20 at 09:55;  Status DC


Hydromorphone HCl (Dilaudid Standard PCA) 12 mg STK-MED ONCE IV ;  Start 5/1/20 

at 15:50;  Stop 5/12/20 at 11:24;  Status DC


Potassium Chloride 80 meq/ Magnesium Sulfate 20 meq/ Multivitamins 10 

ml/Chromium/ Copper/Manganese/ Seleni/Zn 0.5 ml/ Insulin Human Regular 15 unit/ 

Total Parenteral Nutrition/Amino Acids/Dextrose/ Fat Emulsion Intravenous 1,920 

ml @  80 mls/hr TPN  CONT IV  Last administered on 5/12/20at 21:40;  Start 

5/12/20 at 22:00;  Stop 5/13/20 at 21:59;  Status DC


Lidocaine HCl (Buffered Lidocaine 1%) 6 ml 1X  ONCE INJ  Last administered on 

5/12/20at 14:15;  Start 5/12/20 at 14:15;  Stop 5/12/20 at 14:16;  Status DC


Potassium Chloride 80 meq/ Magnesium Sulfate 20 meq/ Multivitamins 10 

ml/Chromium/ Copper/Manganese/ Seleni/Zn 1 ml/ Insulin Human Regular 15 unit/ 

Total Parenteral Nutrition/Amino Acids/Dextrose/ Fat Emulsion Intravenous 1,920 

ml @  80 mls/hr TPN  CONT IV  Last administered on 5/13/20at 22:04;  Start 

5/13/20 at 22:00;  Stop 5/14/20 at 21:59;  Status DC


Potassium Chloride/Water 100 ml @  100 mls/hr 1X  ONCE IV  Last administered on 

5/14/20at 11:34;  Start 5/14/20 at 11:00;  Stop 5/14/20 at 11:59;  Status DC


Potassium Chloride 90 meq/ Magnesium Sulfate 20 meq/ Multivitamins 10 

ml/Chromium/ Copper/Manganese/ Seleni/Zn 1 ml/ Insulin Human Regular 15 unit/ 

Total Parenteral Nutrition/Amino Acids/Dextrose/ Fat Emulsion Intravenous 1,920 

ml @  80 mls/hr TPN  CONT IV  Last administered on 5/14/20at 22:57;  Start 

5/14/20 at 22:00;  Stop 5/15/20 at 21:59;  Status DC


Potassium Chloride 90 meq/ Magnesium Sulfate 20 meq/ Multivitamins 10 

ml/Chromium/ Copper/Manganese/ Seleni/Zn 1 ml/ Insulin Human Regular 15 unit/ 

Total Parenteral Nutrition/Amino Acids/Dextrose/ Fat Emulsion Intravenous 1,920 

ml @  80 mls/hr TPN  CONT IV  Last administered on 5/15/20at 22:48;  Start 

5/15/20 at 22:00;  Stop 5/16/20 at 21:59;  Status DC


Potassium Chloride 90 meq/ Magnesium Sulfate 20 meq/ Multivitamins 10 

ml/Chromium/ Copper/Manganese/ Seleni/Zn 1 ml/ Insulin Human Regular 15 unit/ 

Total Parenteral Nutrition/Amino Acids/Dextrose/ Fat Emulsion Intravenous 1,890 

ml @  78.75 mls/ hr TPN  CONT IV  Last administered on 5/16/20at 22:15;  Start 

5/16/20 at 22:00;  Stop 5/17/20 at 21:59;  Status DC


Linezolid/Dextrose 300 ml @  300 mls/hr Q12HR IV  Last administered on 5/19/20at

21:08;  Start 5/17/20 at 09:00;  Stop 5/20/20 at 08:11;  Status DC


Daptomycin 450 mg/ Sodium Chloride 50 ml @  100 mls/hr Q24H IV  Last 

administered on 5/20/20at 09:25;  Start 5/17/20 at 09:00;  Stop 5/21/20 at 

08:30;  Status DC


Potassium Chloride 90 meq/ Magnesium Sulfate 20 meq/ Multivitamins 10 

ml/Chromium/ Copper/Manganese/ Seleni/Zn 1 ml/ Insulin Human Regular 15 unit/ 

Total Parenteral Nutrition/Amino Acids/Dextrose/ Fat Emulsion Intravenous 1,890 

ml @  78.75 mls/ hr TPN  CONT IV  Last administered on 5/17/20at 21:34;  Start 

5/17/20 at 22:00;  Stop 5/18/20 at 21:59;  Status DC


Lorazepam (Ativan Inj) 2 mg STK-MED ONCE .ROUTE ;  Start 5/17/20 at 14:58;  Stop

5/17/20 at 14:58;  Status DC


Metoprolol Tartrate (Lopressor Vial) 5 mg 1X  ONCE IVP  Last administered on 

5/17/20at 15:31;  Start 5/17/20 at 15:15;  Stop 5/17/20 at 15:16;  Status DC


Lorazepam (Ativan Inj) 2 mg 1X  ONCE IVP  Last administered on 5/17/20at 15:30; 

Start 5/17/20 at 15:15;  Stop 5/17/20 at 15:16;  Status DC


Enoxaparin Sodium (Lovenox 40mg Syringe) 40 mg Q24H SQ  Last administered on 

6/5/20at 17:44;  Start 5/17/20 at 17:00;  Stop 6/7/20 at 06:50;  Status DC


Lorazepam (Ativan Inj) 1 mg PRN Q4HRS  PRN IVP ANXIETY / AGITATION MILD-MOD Last

administered on 5/31/20at 15:55;  Start 5/17/20 at 19:15;  Stop 6/2/20 at 11:45;

 Status DC


Lorazepam (Ativan Inj) 2 mg PRN Q4HRS  PRN IVP ANXIETY / AGITATION SEVERE Last 

administered on 6/1/20at 07:55;  Start 5/17/20 at 19:15;  Stop 6/2/20 at 11:45; 

Status DC


Fentanyl Citrate (Fentanyl 2ml Vial) 50 mcg PRN Q4HRS  PRN IVP SEVERE PAIN Last 

administered on 6/13/20at 05:15;  Start 5/18/20 at 13:15;  Stop 6/14/20 at 

09:29;  Status DC


Fentanyl Citrate (Fentanyl 2ml Vial) 25 mcg PRN Q4HRS  PRN IVP MODERATE PAIN 

Last administered on 6/13/20at 00:27;  Start 5/18/20 at 13:15;  Stop 6/14/20 at 

09:30;  Status DC


Potassium Chloride 90 meq/ Magnesium Sulfate 20 meq/ Multivitamins 10 

ml/Chromium/ Copper/Manganese/ Seleni/Zn 1 ml/ Insulin Human Regular 15 unit/ 

Total Parenteral Nutrition/Amino Acids/Dextrose/ Fat Emulsion Intravenous 1,890 

ml @  78.75 mls/ hr TPN  CONT IV  Last administered on 5/18/20at 22:18;  Start 

5/18/20 at 22:00;  Stop 5/19/20 at 21:59;  Status DC


Furosemide (Lasix) 40 mg 1X  ONCE IVP  Last administered on 5/18/20at 21:51;  

Start 5/18/20 at 21:45;  Stop 5/18/20 at 21:48;  Status DC


Albumin Human 100 ml @  100 mls/hr 1X PRN  PRN IV SEE COMMENTS;  Start 5/19/20 

at 01:30


Furosemide (Lasix) 40 mg [id number] IVP  Last administered on 6/3/20at 08:04;  Start 

5/19/20 at 14:00;  Stop 6/3/20 at 13:07;  Status DC


Potassium Chloride 90 meq/ Magnesium Sulfate 20 meq/ Multivitamins 10 

ml/Chromium/ Copper/Manganese/ Seleni/Zn 1 ml/ Insulin Human Regular 15 unit/ 

Total Parenteral Nutrition/Amino Acids/Dextrose/ Fat Emulsion Intravenous 1,800 

ml @  75 mls/hr TPN  CONT IV  Last administered on 5/19/20at 22:31;  Start 

5/19/20 at 22:00;  Stop 5/20/20 at 21:59;  Status DC


Potassium Chloride 90 meq/ Magnesium Sulfate 20 meq/ Multivitamins 10 

ml/Chromium/ Copper/Manganese/ Seleni/Zn 1 ml/ Insulin Human Regular 15 unit/ 

Total Parenteral Nutrition/Amino Acids/Dextrose/ Fat Emulsion Intravenous 1,800 

ml @  75 mls/hr TPN  CONT IV  Last administered on 5/20/20at 22:28;  Start 

5/20/20 at 22:00;  Stop 5/21/20 at 21:59;  Status DC


Potassium Chloride 110 meq/ Magnesium Sulfate 20 meq/ Multivitamins 10 

ml/Chromium/ Copper/Manganese/ Seleni/Zn 1 ml/ Insulin Human Regular 15 unit/ 

Total Parenteral Nutrition/Amino Acids/Dextrose/ Fat Emulsion Intravenous 1,800 

ml @  75 mls/hr TPN  CONT IV  Last administered on 5/21/20at 22:01;  Start 

5/21/20 at 22:00;  Stop 5/22/20 at 21:59;  Status DC


Saliva Substitute (Biotene Moisturizing Mouth) 2 spray PRN Q15MIN  PRN PO DRY 

MOUTH;  Start 5/21/20 at 11:00


Potassium Chloride 110 meq/ Magnesium Sulfate 20 meq/ Multivitamins 10 

ml/Chromium/ Copper/Manganese/ Seleni/Zn 1 ml/ Insulin Human Regular 15 unit/ 

Total Parenteral Nutrition/Amino Acids/Dextrose/ Fat Emulsion Intravenous 1,800 

ml @  75 mls/hr TPN  CONT IV  Last administered on 5/22/20at 22:21;  Start 

5/22/20 at 22:00;  Stop 5/23/20 at 21:59;  Status DC


Potassium Chloride 110 meq/ Magnesium Sulfate 20 meq/ Multivitamins 10 

ml/Chromium/ Copper/Manganese/ Seleni/Zn 1 ml/ Insulin Human Regular 15 unit/ 

Total Parenteral Nutrition/Amino Acids/Dextrose/ Fat Emulsion Intravenous 1,800 

ml @  75 mls/hr TPN  CONT IV  Last administered on 5/23/20at 22:04;  Start 

5/23/20 at 22:00;  Stop 5/24/20 at 21:59;  Status DC


Potassium Chloride 110 meq/ Magnesium Sulfate 20 meq/ Multivitamins 10 

ml/Chromium/ Copper/Manganese/ Seleni/Zn 1 ml/ Insulin Human Regular 15 unit/ 

Total Parenteral Nutrition/Amino Acids/Dextrose/ Fat Emulsion Intravenous 1,800 

ml @  75 mls/hr TPN  CONT IV  Last administered on 5/24/20at 22:48;  Start 

5/24/20 at 22:00;  Stop 5/25/20 at 21:59;  Status DC


Potassium Chloride 70 meq/ Magnesium Sulfate 20 meq/ Multivitamins 10 

ml/Chromium/ Copper/Manganese/ Seleni/Zn 1 ml/ Insulin Human Regular 15 unit/ 

Total Parenteral Nutrition/Amino Acids/Dextrose/ Fat Emulsion Intravenous 1,800 

ml @  75 mls/hr TPN  CONT IV  Last administered on 5/25/20at 21:39;  Start 

5/25/20 at 22:00;  Stop 5/26/20 at 21:59;  Status DC


Meropenem 500 mg/ Sodium Chloride 50 ml @  100 mls/hr Q6HRS IV  Last 

administered on 5/27/20at 06:02;  Start 5/25/20 at 18:00;  Stop 5/27/20 at 

09:59;  Status DC


Barium Sulfate (Varibar Thin Liquid Apple) 148 gm 1X  ONCE PO ;  Start 5/26/20 

at 11:45;  Stop 5/26/20 at 11:49;  Status DC


Potassium Chloride 70 meq/ Magnesium Sulfate 20 meq/ Multivitamins 10 

ml/Chromium/ Copper/Manganese/ Seleni/Zn 1 ml/ Insulin Human Regular 15 unit/ 

Total Parenteral Nutrition/Amino Acids/Dextrose/ Fat Emulsion Intravenous 1,800 

ml @  75 mls/hr TPN  CONT IV  Last administered on 5/26/20at 22:27;  Start 

5/26/20 at 22:00;  Stop 5/27/20 at 21:59;  Status DC


Piperacillin Sod/ Tazobactam Sod 3.375 gm/Sodium Chloride 50 ml @  100 mls/hr 

Q6HRS IV  Last administered on 6/4/20at 06:10;  Start 5/27/20 at 12:00;  Stop 

6/4/20 at 07:26;  Status DC


Potassium Chloride 70 meq/ Magnesium Sulfate 20 meq/ Multivitamins 10 

ml/Chromium/ Copper/Manganese/ Seleni/Zn 1 ml/ Insulin Human Regular 15 unit/ 

Total Parenteral Nutrition/Amino Acids/Dextrose/ Fat Emulsion Intravenous 1,800 

ml @  75 mls/hr TPN  CONT IV  Last administered on 5/27/20at 22:03;  Start 

5/27/20 at 22:00;  Stop 5/28/20 at 21:59;  Status DC


Potassium Chloride 70 meq/ Magnesium Sulfate 20 meq/ Multivitamins 10 

ml/Chromium/ Copper/Manganese/ Seleni/Zn 1 ml/ Insulin Human Regular 15 unit/ 

Total Parenteral Nutrition/Amino Acids/Dextrose/ Fat Emulsion Intravenous 1,800 

ml @  75 mls/hr TPN  CONT IV  Last administered on 5/28/20at 22:33;  Start 

5/28/20 at 22:00;  Stop 5/29/20 at 21:59;  Status DC


Potassium Chloride 70 meq/ Magnesium Sulfate 20 meq/ Multivitamins 10 

ml/Chromium/ Copper/Manganese/ Seleni/Zn 1 ml/ Insulin Human Regular 15 unit/ 

Total Parenteral Nutrition/Amino Acids/Dextrose/ Fat Emulsion Intravenous 1,800 

ml @  75 mls/hr TPN  CONT IV  Last administered on 5/29/20at 23:13;  Start 

5/29/20 at 22:00;  Stop 5/30/20 at 21:59;  Status DC


Potassium Chloride 80 meq/ Magnesium Sulfate 20 meq/ Multivitamins 10 

ml/Chromium/ Copper/Manganese/ Seleni/Zn 1 ml/ Insulin Human Regular 15 unit/ 

Total Parenteral Nutrition/Amino Acids/Dextrose/ Fat Emulsion Intravenous 1,800 

ml @  75 mls/hr TPN  CONT IV  Last administered on 5/30/20at 22:30;  Start 

5/30/20 at 22:00;  Stop 5/31/20 at 21:59;  Status DC


Potassium Chloride 80 meq/ Magnesium Sulfate 20 meq/ Multivitamins 10 

ml/Chromium/ Copper/Manganese/ Seleni/Zn 1 ml/ Insulin Human Regular 15 unit/ 

Total Parenteral Nutrition/Amino Acids/Dextrose/ Fat Emulsion Intravenous 1,800 

ml @  75 mls/hr TPN  CONT IV  Last administered on 5/31/20at 21:54;  Start 

5/31/20 at 22:00;  Stop 6/1/20 at 21:59;  Status DC


Potassium Chloride/Water 100 ml @  100 mls/hr 1X  ONCE IV  Last administered on 

6/1/20at 10:15;  Start 6/1/20 at 10:00;  Stop 6/1/20 at 10:59;  Status DC


Potassium Chloride 90 meq/ Magnesium Sulfate 20 meq/ Multivitamins 10 

ml/Chromium/ Copper/Manganese/ Seleni/Zn 1 ml/ Insulin Human Regular 20 unit/ 

Total Parenteral Nutrition/Amino Acids/Dextrose/ Fat Emulsion Intravenous 1,800 

ml @  75 mls/hr TPN  CONT IV  Last administered on 6/1/20at 22:28;  Start 6/1/20

at 22:00;  Stop 6/2/20 at 21:59;  Status DC


Potassium Chloride 90 meq/ Magnesium Sulfate 20 meq/ Multivitamins 10 

ml/Chromium/ Copper/Manganese/ Seleni/Zn 1 ml/ Insulin Human Regular 20 unit/ 

Total Parenteral Nutrition/Amino Acids/Dextrose/ Fat Emulsion Intravenous 1,800 

ml @  75 mls/hr TPN  CONT IV  Last administered on 6/2/20at 22:08;  Start 6/2/20

at 22:00;  Stop 6/3/20 at 21:59;  Status DC


Lorazepam (Ativan Inj) 0.25 mg PRN Q4HRS  PRN IVP ANXIETY / AGITATION Last 

administered on 6/27/20at 13:37;  Start 6/3/20 at 07:30


Potassium Chloride 90 meq/ Magnesium Sulfate 20 meq/ Multivitamins 10 

ml/Chromium/ Copper/Manganese/ Seleni/Zn 1 ml/ Insulin Human Regular 20 unit/ 

Total Parenteral Nutrition/Amino Acids/Dextrose/ Fat Emulsion Intravenous 1,800 

ml @  75 mls/hr TPN  CONT IV  Last administered on 6/3/20at 23:13;  Start 6/3/20

at 22:00;  Stop 6/4/20 at 21:59;  Status DC


Furosemide (Lasix) 40 mg DAILY IVP  Last administered on 6/5/20at 11:14;  Start 

6/3/20 at 13:30;  Stop 6/7/20 at 09:12;  Status DC


Fluoxetine HCl (PROzac) 20 mg QHS PEG  Last administered on 7/4/20at 20:32;  St

art 6/4/20 at 21:00


Fentanyl (Duragesic 50mcg/ Hr Patch) 1 patch Q72H TD  Last administered on 

6/4/20at 21:22;  Start 6/4/20 at 21:00;  Stop 6/13/20 at 12:00;  Status DC


Potassium Chloride 40 meq/ Potassium Acetate 60 meq/Magnesium Sulfate 10 meq/ 

Multivitamins 10 ml/Chromium/ Copper/Manganese/ Seleni/Zn 1 ml/ Insulin Human 

Regular 20 unit/ Total Parenteral Nutrition/Amino Acids/Dextrose/ Fat Emulsion 

Intravenous 1,800 ml @  75 mls/hr TPN  CONT IV  Last administered on 6/5/20at 

00:03;  Start 6/4/20 at 22:00;  Stop 6/5/20 at 21:59;  Status DC


Potassium Acetate 80 meq/Magnesium Sulfate 5 meq/ Multivitamins 10 ml/Chromium/ 

Copper/Manganese/ Seleni/Zn 1 ml/ Insulin Human Regular 20 unit/ Total 

Parenteral Nutrition/Amino Acids/Dextrose/ Fat Emulsion Intravenous 1,920 ml @  

80 mls/hr TPN  CONT IV  Last administered on 6/5/20at 21:59;  Start 6/5/20 at 

22:00;  Stop 6/6/20 at 21:59;  Status DC


Potassium Acetate 60 meq/Magnesium Sulfate 5 meq/ Multivitamins 10 ml/Chromium/ 

Copper/Manganese/ Seleni/Zn 1 ml/ Insulin Human Regular 30 unit/ Total 

Parenteral Nutrition/Amino Acids/Dextrose/ Fat Emulsion Intravenous 1,920 ml @  

80 mls/hr TPN  CONT IV  Last administered on 6/6/20at 21:54;  Start 6/6/20 at 

22:00;  Stop 6/7/20 at 21:59;  Status DC


Norepinephrine Bitartrate 8 mg/ Dextrose 258 ml @  13.332 mls/ hr CONT  PRN IV 

PER PROTOCOL Last administered on 7/2/20at 09:09;  Start 6/7/20 at 06:30


Albumin Human 500 ml @  125 mls/hr 1X  ONCE IV  Last administered on 6/7/20at 

08:10;  Start 6/7/20 at 08:15;  Stop 6/7/20 at 12:14;  Status DC


Potassium Acetate 40 meq/Magnesium Sulfate 5 meq/ Multivitamins 10 ml/Chromium/ 

Copper/Manganese/ Seleni/Zn 1 ml/ Insulin Human Regular 30 unit/ Total 

Parenteral Nutrition/Amino Acids/Dextrose/ Fat Emulsion Intravenous 1,920 ml @  

80 mls/hr TPN  CONT IV  Last administered on 6/7/20at 22:23;  Start 6/7/20 at 

22:00;  Stop 6/8/20 at 21:59;  Status DC


Meropenem 1 gm/ Sodium Chloride 100 ml @  200 mls/hr Q8HRS IV ;  Start 6/7/20 at

14:00;  Status Cancel


Meropenem 1 gm/ Sodium Chloride 100 ml @  200 mls/hr Q8HRS IV  Last administered

on 6/7/20at 11:04;  Start 6/7/20 at 10:00;  Stop 6/7/20 at 13:00;  Status DC


Meropenem 1 gm/ Sodium Chloride 100 ml @  200 mls/hr Q12HR IV  Last administered

on 6/25/20at 08:27;  Start 6/7/20 at 21:00;  Stop 6/25/20 at 08:56;  Status DC


Sodium Chloride 1,000 ml @  1,000 mls/hr 1X  ONCE IV  Last administered on 

6/7/20at 11:06;  Start 6/7/20 at 10:45;  Stop 6/7/20 at 11:44;  Status DC


Micafungin Sodium 100 mg/Dextrose 100 ml @  100 mls/hr Q24H IV  Last 

administered on 6/24/20at 12:34;  Start 6/7/20 at 11:00;  Stop 6/25/20 at 08:56;

 Status DC


Daptomycin 410 mg/ Sodium Chloride 50 ml @  100 mls/hr Q24H IV  Last 

administered on 6/9/20at 13:33;  Start 6/7/20 at 14:00;  Stop 6/10/20 at 08:30; 

Status DC


Midazolam HCl (Versed) 2 mg STK-MED ONCE .ROUTE ;  Start 6/7/20 at 14:47;  Stop 

6/7/20 at 14:48;  Status DC


Fentanyl Citrate (Fentanyl 2ml Vial) 100 mcg STK-MED ONCE .ROUTE ;  Start 6/7/20

at 14:47;  Stop 6/7/20 at 14:48;  Status DC


Flumazenil (Romazicon) 0.5 mg STK-MED ONCE IV ;  Start 6/7/20 at 14:48;  Stop 

6/7/20 at 14:48;  Status DC


Naloxone HCl (Narcan) 0.4 mg STK-MED ONCE .ROUTE ;  Start 6/7/20 at 14:48;  Stop

6/7/20 at 14:48;  Status DC


Lidocaine HCl (Lidocaine 1% 20ml Vial) 20 ml STK-MED ONCE .ROUTE ;  Start 6/7/20

at 14:48;  Stop 6/7/20 at 14:48;  Status DC


Midazolam HCl (Versed) 2 mg 1X  ONCE IV  Last administered on 6/7/20at 15:28;  

Start 6/7/20 at 15:00;  Stop 6/7/20 at 15:01;  Status DC


Fentanyl Citrate (Fentanyl 2ml Vial) 100 mcg 1X  ONCE IV  Last administered on 

6/7/20at 15:28;  Start 6/7/20 at 15:00;  Stop 6/7/20 at 15:01;  Status DC


Lidocaine HCl (Lidocaine 1% 20ml Vial) 20 ml 1X  ONCE INJ  Last administered on 

6/7/20at 15:30;  Start 6/7/20 at 15:00;  Stop 6/7/20 at 15:01;  Status DC


Sodium Chloride 1,000 ml @  100 mls/hr Q10H IV  Last administered on 6/16/20at 

07:30;  Start 6/7/20 at 20:00;  Stop 6/16/20 at 11:26;  Status DC


Sodium Bicarbonate (Sodium Bicarb Adult 8.4% Syr) 50 meq 1X  ONCE IV  Last 

administered on 6/7/20at 21:47;  Start 6/7/20 at 22:00;  Stop 6/7/20 at 22:01;  

Status DC


Potassium Acetate 40 meq/Magnesium Sulfate 5 meq/ Multivitamins 10 ml/Chromium/ 

Copper/Manganese/ Seleni/Zn 1 ml/ Insulin Human Regular 30 unit/ Total 

Parenteral Nutrition/Amino Acids/Dextrose/ Fat Emulsion Intravenous 1,920 ml @  

80 mls/hr TPN  CONT IV  Last administered on 6/8/20at 22:28;  Start 6/8/20 at 

22:00;  Stop 6/9/20 at 21:59;  Status DC


Sodium Chloride 500 ml @  500 mls/hr 1X  ONCE IV  Last administered on 6/9/20at 

06:39;  Start 6/9/20 at 06:45;  Stop 6/9/20 at 07:44;  Status DC


Potassium Acetate 40 meq/Magnesium Sulfate 5 meq/ Multivitamins 10 ml/Chromium/ 

Copper/Manganese/ Seleni/Zn 1 ml/ Insulin Human Regular 30 unit/ Total 

Parenteral Nutrition/Amino Acids/Dextrose/ Fat Emulsion Intravenous 1,920 ml @  

80 mls/hr TPN  CONT IV  Last administered on 6/9/20at 22:03;  Start 6/9/20 at 

22:00;  Stop 6/10/20 at 21:59;  Status DC


Metoprolol Tartrate (Lopressor Vial) 5 mg PRN Q6HRS  PRN IVP HYPERTENSION Last 

administered on 6/18/20at 10:14;  Start 6/10/20 at 09:00


Potassium Acetate 40 meq/Magnesium Sulfate 5 meq/ Multivitamins 10 ml/Chromium/ 

Copper/Manganese/ Seleni/Zn 1 ml/ Insulin Human Regular 30 unit/ Total Paren

teral Nutrition/Amino Acids/Dextrose/ Fat Emulsion Intravenous 1,920 ml @  80 

mls/hr TPN  CONT IV  Last administered on 6/10/20at 21:26;  Start 6/10/20 at 

22:00;  Stop 6/11/20 at 21:59;  Status DC


Potassium Acetate 40 meq/Magnesium Sulfate 5 meq/ Multivitamins 10 ml/Chromium/ 

Copper/Manganese/ Seleni/Zn 1 ml/ Insulin Human Regular 30 unit/ Total 

Parenteral Nutrition/Amino Acids/Dextrose/ Fat Emulsion Intravenous 1,920 ml @  

80 mls/hr TPN  CONT IV  Last administered on 6/11/20at 23:23;  Start 6/11/20 at 

22:00;  Stop 6/12/20 at 21:59;  Status DC


Potassium Acetate 40 meq/Magnesium Sulfate 5 meq/ Multivitamins 10 ml/Chromium/ 

Copper/Manganese/ Seleni/Zn 1 ml/ Insulin Human Regular 30 unit/ Total 

Parenteral Nutrition/Amino Acids/Dextrose/ Fat Emulsion Intravenous 1,920 ml @  

80 mls/hr TPN  CONT IV  Last administered on 6/12/20at 21:35;  Start 6/12/20 at 

22:00;  Stop 6/13/20 at 21:59;  Status DC


Furosemide (Lasix) 20 mg 1X  ONCE IVP  Last administered on 6/13/20at 06:26;  

Start 6/13/20 at 06:15;  Stop 6/13/20 at 06:16;  Status DC


Methylprednisolone Sodium Succinate (SOLU-Medrol 125MG VIAL) 125 mg 1X  ONCE IV 

Last administered on 6/13/20at 06:26;  Start 6/13/20 at 06:15;  Stop 6/13/20 at 

06:16;  Status DC


Albuterol/ Ipratropium (Duoneb) 3 ml Q4HRS NEB  Last administered on 7/5/20at 

15:13;  Start 6/13/20 at 08:00


Fentanyl Citrate 30 ml @ 0 mls/hr CONT  PRN IV SEE PROTOCOL Last administered on

7/4/20at 08:03;  Start 6/13/20 at 06:00;  Stop 7/4/20 at 12:42;  Status DC


Propofol 100 ml @ 0 mls/hr CONT  PRN IV SEE PROTOCOL Last administered on 

6/20/20at 23:50;  Start 6/13/20 at 06:00


Fentanyl Citrate (Fentanyl 2ml Vial) 25 mcg PRN Q1HR  PRN IV SEE COMMENTS;  

Start 6/13/20 at 06:00


Fentanyl Citrate (Fentanyl 2ml Vial) 50 mcg PRN Q1HR  PRN IV SEE COMMENTS;  

Start 6/13/20 at 06:00


Chlorhexidine Gluconate (Peridex) 15 ml BID MM ;  Start 6/13/20 at 09:00;  Stop 

6/13/20 at 07:58;  Status DC


Potassium Acetate 40 meq/Magnesium Sulfate 5 meq/ Multivitamins 10 ml/Chromium/ 

Copper/Manganese/ Seleni/Zn 1 ml/ Insulin Human Regular 30 unit/ Total 

Parenteral Nutrition/Amino Acids/Dextrose/ Fat Emulsion Intravenous 1,920 ml @  

80 mls/hr TPN  CONT IV  Last administered on 6/13/20at 21:19;  Start 6/13/20 at 

22:00;  Stop 6/14/20 at 21:59;  Status DC


Acetylcysteine (Mucomyst 20% Resp Treatment) 600 mg BID NEB  Last administered 

on 6/19/20at 09:33;  Start 6/13/20 at 21:00;  Stop 6/19/20 at 10:39;  Status DC


Magnesium Sulfate 100 ml @  25 mls/hr 1X  ONCE IV  Last administered on 

6/13/20at 15:48;  Start 6/13/20 at 15:45;  Stop 6/13/20 at 19:44;  Status DC


Potassium Acetate 40 meq/Magnesium Sulfate 5 meq/ Multivitamins 10 ml/Chromium/ 

Copper/Manganese/ Seleni/Zn 1 ml/ Insulin Human Regular 30 unit/ Total 

Parenteral Nutrition/Amino Acids/Dextrose/ Fat Emulsion Intravenous 1,920 ml @  

80 mls/hr TPN  CONT IV  Last administered on 6/14/20at 21:35;  Start 6/14/20 at 

22:00;  Stop 6/15/20 at 21:59;  Status DC


Potassium Chloride/Water 100 ml @  100 mls/hr Q1H IV  Last administered on 

6/15/20at 08:31;  Start 6/15/20 at 07:00;  Stop 6/15/20 at 08:59;  Status DC


Potassium Acetate 40 meq/Magnesium Sulfate 5 meq/ Multivitamins 10 ml/Chromium/ 

Copper/Manganese/ Seleni/Zn 1 ml/ Insulin Human Regular 30 unit/ Total 

Parenteral Nutrition/Amino Acids/Dextrose/ Fat Emulsion Intravenous 1,920 ml @  

80 mls/hr TPN  CONT IV  Last administered on 6/15/20at 21:54;  Start 6/15/20 at 

22:00;  Stop 6/16/20 at 19:34;  Status DC


Lidocaine HCl (Buffered Lidocaine 1%) 3 ml STK-MED ONCE .ROUTE ;  Start 6/15/20 

at 12:14;  Stop 6/15/20 at 12:14;  Status DC


Lidocaine HCl (Buffered Lidocaine 1%) 3 ml 1X  ONCE IJ  Last administered on 

6/15/20at 13:11;  Start 6/15/20 at 13:00;  Stop 6/15/20 at 13:01;  Status DC


Magnesium Sulfate 50 ml @ 25 mls/hr 1X  ONCE IV ;  Start 6/16/20 at 08:15;  Stop

6/16/20 at 10:14;  Status DC


Potassium Acetate 40 meq/Magnesium Sulfate 10 meq/ Multivitamins 10 ml/Chromium/

Copper/Manganese/ Seleni/Zn 1 ml/ Insulin Human Regular 20 unit/ Total Pa

renteral Nutrition/Amino Acids/Dextrose/ Fat Emulsion Intravenous 1,920 ml @  80

mls/hr TPN  CONT IV  Last administered on 6/16/20at 21:32;  Start 6/16/20 at 

22:00;  Stop 6/17/20 at 21:59;  Status DC


Potassium Chloride/Water 100 ml @  100 mls/hr Q1H IV  Last administered on 

6/17/20at 09:12;  Start 6/17/20 at 08:00;  Stop 6/17/20 at 09:59;  Status DC


Alteplase, Recombinant (Cathflo For Central Catheter Clearance) 4 mg 1X  ONCE 

INT CAT ;  Start 6/17/20 at 09:15;  Stop 6/17/20 at 09:16;  Status UNV


Alteplase, Recombinant (Cathflo For Central Catheter Clearance) 4 mg 1X  ONCE 

INT CAT ;  Start 6/17/20 at 09:15;  Stop 6/17/20 at 09:16;  Status UNV


Alteplase, Recombinant (Cathflo For Central Catheter Clearance) 4 mg 1X  ONCE 

INT CAT ;  Start 6/17/20 at 09:15;  Stop 6/17/20 at 09:16;  Status UNV


Alteplase, Recombinant 4 mg/ Sodium Chloride 20 ml @ 20 mls/hr 1X  ONCE IV  Last

administered on 6/17/20at 10:10;  Start 6/17/20 at 10:00;  Stop 6/17/20 at 

10:59;  Status DC


Alteplase, Recombinant 4 mg/ Sodium Chloride 20 ml @ 20 mls/hr 1X  ONCE IV  Last

administered on 6/17/20at 10:09;  Start 6/17/20 at 10:00;  Stop 6/17/20 at 

10:59;  Status DC


Alteplase, Recombinant 4 mg/ Sodium Chloride 20 ml @ 20 mls/hr 1X  ONCE IV  Last

administered on 6/17/20at 10:09;  Start 6/17/20 at 10:00;  Stop 6/17/20 at 

10:59;  Status DC


Potassium Acetate 60 meq/Magnesium Sulfate 10 meq/ Multivitamins 10 ml/Chromium/

Copper/Manganese/ Seleni/Zn 1 ml/ Insulin Human Regular 20 unit/ Total 

Parenteral Nutrition/Amino Acids/Dextrose/ Fat Emulsion Intravenous 1,920 ml @  

80 mls/hr TPN  CONT IV  Last administered on 6/17/20at 21:55;  Start 6/17/20 at 

22:00;  Stop 6/18/20 at 21:59;  Status DC


Albumin Human 500 ml @  125 mls/hr 1X  ONCE IV  Last administered on 6/18/20at 

12:01;  Start 6/18/20 at 11:15;  Stop 6/18/20 at 15:14;  Status DC


Sodium Chloride 500 ml @  500 mls/hr 1X  ONCE IV  Last administered on 6/18/20at

13:50;  Start 6/18/20 at 11:15;  Stop 6/18/20 at 12:14;  Status DC


Potassium Acetate 60 meq/Magnesium Sulfate 14 meq/ Multivitamins 10 ml/Chromium/

Copper/Manganese/ Seleni/Zn 1 ml/ Insulin Human Regular 20 unit/ Total 

Parenteral Nutrition/Amino Acids/Dextrose/ Fat Emulsion Intravenous 1,920 ml @  

80 mls/hr TPN  CONT IV  Last administered on 6/18/20at 22:26;  Start 6/18/20 at 

22:00;  Stop 6/19/20 at 21:59;  Status DC


Ciprofloxacin/ Dextrose 200 ml @  200 mls/hr Q12HR IV  Last administered on 

6/25/20at 08:27;  Start 6/18/20 at 21:00;  Stop 6/25/20 at 08:56;  Status DC


Albumin Human 250 ml @  62.5 mls/hr 1X  ONCE IV  Last administered on 6/19/20at 

11:09;  Start 6/19/20 at 11:00;  Stop 6/19/20 at 14:59;  Status DC


Furosemide (Lasix) 20 mg 1X  ONCE IVP  Last administered on 6/19/20at 14:52;  

Start 6/19/20 at 10:45;  Stop 6/19/20 at 10:49;  Status DC


Potassium Acetate 60 meq/Magnesium Sulfate 14 meq/ Multivitamins 10 ml/Chromium/

Copper/Manganese/ Seleni/Zn 1 ml/ Insulin Human Regular 15 unit/ Total 

Parenteral Nutrition/Amino Acids/Dextrose/ Fat Emulsion Intravenous 1,920 ml @  

80 mls/hr TPN  CONT IV  Last administered on 6/19/20at 22:08;  Start 6/19/20 at 

22:00;  Stop 6/20/20 at 21:59;  Status DC


Potassium Acetate 60 meq/Magnesium Sulfate 14 meq/ Multivitamins 10 ml/Chromium/

Copper/Manganese/ Seleni/Zn 1 ml/ Insulin Human Regular 15 unit/ Total 

Parenteral Nutrition/Amino Acids/Dextrose/ Fat Emulsion Intravenous 1,920 ml @  

80 mls/hr TPN  CONT IV  Last administered on 6/20/20at 22:12;  Start 6/20/20 at 

22:00;  Stop 6/21/20 at 21:59;  Status DC


Potassium Acetate 60 meq/Magnesium Sulfate 14 meq/ Multivitamins 10 ml/Chromium/

Copper/Manganese/ Seleni/Zn 1 ml/ Insulin Human Regular 15 unit/ Total 

Parenteral Nutrition/Amino Acids/Dextrose/ Fat Emulsion Intravenous 1,920 ml @  

80 mls/hr TPN  CONT IV  Last administered on 6/21/20at 22:22;  Start 6/21/20 at 

22:00;  Stop 6/22/20 at 21:59;  Status DC


Furosemide (Lasix) 20 mg 1X  ONCE IVP  Last administered on 6/22/20at 11:07;  

Start 6/22/20 at 10:30;  Stop 6/22/20 at 10:34;  Status DC


Potassium Acetate 60 meq/Magnesium Sulfate 14 meq/ Multivitamins 10 ml/Chromium/

Copper/Manganese/ Seleni/Zn 1 ml/ Insulin Human Regular 15 unit/ Sodium Chloride

20 meq/Total Parenteral Nutrition/Amino Acids/Dextrose/ Fat Emulsion Intravenous

1,920 ml @  80 mls/hr TPN  CONT IV  Last administered on 6/22/20at 21:54;  Start

6/22/20 at 22:00;  Stop 6/23/20 at 21:59;  Status DC


Potassium Acetate 30 meq/Magnesium Sulfate 14 meq/ Multivitamins 10 ml/Chromium/

Copper/Manganese/ Seleni/Zn 1 ml/ Insulin Human Regular 15 unit/ Sodium Chloride

20 meq/Potassium Chloride 30 meq/ Total Parenteral Nutrition/Amino 

Acids/Dextrose/ Fat Emulsion Intravenous 1,920 ml @  80 mls/hr TPN  CONT IV  

Last administered on 6/23/20at 21:46;  Start 6/23/20 at 22:00;  Stop 6/24/20 at 

21:59;  Status DC


Sodium Chloride 80 meq/Potassium Chloride 30 meq/ Potassium Acetate 30 

meq/Magnesium Sulfate 14 meq/ Multivitamins 10 ml/Chromium/ Copper/Manganese/ 

Seleni/Zn 1 ml/ Insulin Human Regular 15 unit/ Total Parenteral Nutrition/Amino 

Acids/Dextrose/ Fat Emulsion Intravenous 1,920 ml @  80 mls/hr TPN  CONT IV  

Last administered on 6/24/20at 22:33;  Start 6/24/20 at 22:00;  Stop 6/25/20 at 

21:59;  Status DC


Furosemide (Lasix) 40 mg 1X  ONCE IVP  Last administered on 6/24/20at 16:27;  

Start 6/24/20 at 15:30;  Stop 6/24/20 at 15:33;  Status DC


Albumin Human 250 ml @  62.5 mls/hr 1X  ONCE IV  Last administered on 6/24/20at 

16:27;  Start 6/24/20 at 15:30;  Stop 6/24/20 at 19:29;  Status DC


Sodium Chloride 80 meq/Potassium Chloride 30 meq/ Potassium Acetate 30 meq/M

agnesium Sulfate 14 meq/ Multivitamins 10 ml/Chromium/ Copper/Manganese/ 

Seleni/Zn 1 ml/ Insulin Human Regular 15 unit/ Total Parenteral Nutrition/Amino 

Acids/Dextrose/ Fat Emulsion Intravenous 1,920 ml @  80 mls/hr TPN  CONT IV  

Last administered on 6/25/20at 22:25;  Start 6/25/20 at 22:00;  Stop 6/26/20 at 

21:59;  Status DC


Sodium Chloride 80 meq/Potassium Chloride 30 meq/ Potassium Acetate 30 

meq/Magnesium Sulfate 14 meq/ Multivitamins 10 ml/Chromium/ Copper/Manganese/ 

Seleni/Zn 1 ml/ Insulin Human Regular 15 unit/ Total Parenteral Nutrition/Amino 

Acids/Dextrose/ Fat Emulsion Intravenous 1,920 ml @  80 mls/hr TPN  CONT IV  

Last administered on 6/26/20at 21:32;  Start 6/26/20 at 22:00;  Stop 6/27/20 at 

21:59;  Status DC


Sodium Chloride 80 meq/Potassium Chloride 30 meq/ Potassium Acetate 30 

meq/Magnesium Sulfate 14 meq/ Multivitamins 10 ml/Chromium/ Copper/Manganese/ 

Seleni/Zn 1 ml/ Insulin Human Regular 15 unit/ Total Parenteral Nutrition/Amino 

Acids/Dextrose/ Fat Emulsion Intravenous 1,920 ml @  80 mls/hr TPN  CONT IV  

Last administered on 6/27/20at 21:53;  Start 6/27/20 at 22:00;  Stop 6/28/20 at 

21:59;  Status DC


Acetylcysteine (Mucomyst 20% Resp Treatment) 600 mg RTBID NEB  Last administered

on 7/5/20at 08:45;  Start 6/27/20 at 12:00


Sodium Chloride 80 meq/Potassium Chloride 30 meq/ Potassium Acetate 30 

meq/Magnesium Sulfate 14 meq/ Multivitamins 10 ml/Chromium/ Copper/Manganese/ 

Seleni/Zn 1 ml/ Insulin Human Regular 15 unit/ Total Parenteral Nutrition/Amino 

Acids/Dextrose/ Fat Emulsion Intravenous 1,920 ml @  80 mls/hr TPN  CONT IV  

Last administered on 6/28/20at 22:06;  Start 6/28/20 at 22:00;  Stop 6/29/20 at 

21:59;  Status DC


Meropenem 500 mg/ Sodium Chloride 50 ml @  100 mls/hr Q6HRS IV  Last 

administered on 7/5/20at 12:48;  Start 6/28/20 at 18:00


Daptomycin 500 mg/ Sodium Chloride 50 ml @  100 mls/hr Q24H IV  Last 

administered on 7/4/20at 20:03;  Start 6/28/20 at 19:00


Sodium Chloride 80 meq/Potassium Chloride 30 meq/ Potassium Acetate 30 

meq/Magnesium Sulfate 14 meq/ Multivitamins 10 ml/Chromium/ Copper/Manganese/ 

Seleni/Zn 1 ml/ Insulin Human Regular 15 unit/ Total Parenteral Nutrition/Amino 

Acids/Dextrose/ Fat Emulsion Intravenous 1,920 ml @  80 mls/hr TPN  CONT IV  

Last administered on 6/29/20at 22:09;  Start 6/29/20 at 22:00;  Stop 6/30/20 at 

21:59;  Status DC


Heparin Sodium (Porcine) 1000 unit/Sodium Chloride 1,001 ml @  1,001 mls/hr 1X  

ONCE IRR ;  Start 6/30/20 at 06:00;  Stop 6/30/20 at 06:59;  Status DC


Propofol (Diprivan) 200 mg STK-MED ONCE IV ;  Start 6/30/20 at 07:44;  Stop 

6/30/20 at 07:44;  Status DC


Lidocaine HCl (Lidocaine Pf 2% Vial) 5 ml STK-MED ONCE .ROUTE ;  Start 6/30/20 

at 07:44;  Stop 6/30/20 at 07:44;  Status DC


Fentanyl Citrate (Fentanyl 2ml Vial) 100 mcg STK-MED ONCE .ROUTE ;  Start 

6/30/20 at 07:44;  Stop 6/30/20 at 07:44;  Status DC


Rocuronium Bromide (Zemuron) 100 mg STK-MED ONCE .ROUTE ;  Start 6/30/20 at 

07:44;  Stop 6/30/20 at 07:44;  Status DC


Micafungin Sodium 100 mg/Dextrose 100 ml @  100 mls/hr Q24H IV  Last 

administered on 7/5/20at 08:22;  Start 6/30/20 at 08:30


Bupivacaine HCl/ Epinephrine Bitart (Sensorcain-Epi 0.5%-1:[id number] Mpf) 30 ml 

STK-MED ONCE .ROUTE ;  Start 6/30/20 at 08:34;  Stop 6/30/20 at 08:35;  Status 

DC


Iohexol (Omnipaque 300 Mg/ml) 50 ml STK-MED ONCE .ROUTE  Last administered on 

6/30/20at 13:30;  Start 6/30/20 at 08:35;  Stop 6/30/20 at 08:35;  Status DC


Sodium Chloride 80 meq/Potassium Chloride 30 meq/ Potassium Acetate 30 meq

/Magnesium Sulfate 14 meq/ Multivitamins 10 ml/Chromium/ Copper/Manganese/ 

Seleni/Zn 1 ml/ Insulin Human Regular 15 unit/ Total Parenteral Nutrition/Amino 

Acids/Dextrose/ Fat Emulsion Intravenous 1,920 ml @  80 mls/hr TPN  CONT IV  

Last administered on 7/1/20at 01:22;  Start 6/30/20 at 22:00;  Stop 7/1/20 at 

21:59;  Status DC


Phenylephrine HCl (Ken-Synephrine Inj) 10 mg STK-MED ONCE .ROUTE ;  Start 

6/30/20 at 10:15;  Stop 6/30/20 at 10:15;  Status DC


Desflurane (Suprane) 90 ml STK-MED ONCE IH ;  Start 6/30/20 at 10:18;  Stop 

6/30/20 at 10:19;  Status DC


Albumin Human 500 ml @  As Directed STK-MED ONCE IV ;  Start 6/30/20 at 11:06;  

Stop 6/30/20 at 11:06;  Status DC


Vasopressin (Vasostrict) 20 unit STK-MED ONCE .ROUTE ;  Start 6/30/20 at 12:23; 

Stop 6/30/20 at 12:23;  Status DC


Phenylephrine HCl (Ken-Synephrine Inj) 10 mg STK-MED ONCE .ROUTE ;  Start 

6/30/20 at 13:33;  Stop 6/30/20 at 13:33;  Status DC


Phenylephrine HCl (Ken-Synephrine Inj) 10 mg STK-MED ONCE .ROUTE ;  Start 

6/30/20 at 13:33;  Stop 6/30/20 at 13:33;  Status DC


Ondansetron HCl (Zofran) 4 mg STK-MED ONCE .ROUTE ;  Start 6/30/20 at 13:33;  

Stop 6/30/20 at 13:33;  Status DC


Enoxaparin Sodium (Lovenox 40mg Syringe) 40 mg Q24H SQ  Last administered on 

7/5/20at 08:21;  Start 7/1/20 at 08:00


Sodium Chloride (Normal Saline Flush) 3 ml QSHIFT  PRN IV AFTER MEDS AND BLOOD 

DRAWS;  Start 6/30/20 at 14:45


Naloxone HCl (Narcan) 0.4 mg PRN Q2MIN  PRN IV SEE INSTRUCTIONS;  Start 6/30/20 

at 14:45


Sodium Chloride 1,000 ml @  25 mls/hr Q24H IV  Last administered on 7/4/20at 

12:37;  Start 6/30/20 at 14:33


Morphine Sulfate (Morphine Sulfate) 1 mg PRN Q1HR  PRN IV PAIN;  Start 6/30/20 

at 14:45


Midazolam HCl 100 mg/Sodium Chloride 100 ml @ 1 mls/hr CONT  PRN IV SEE I/O 

RECORD Last administered on 7/3/20at 18:48;  Start 6/30/20 at 14:45


Phenylephrine HCl (PHENYLEPHRINE in 0.9% NACL PF) 1 mg STK-MED ONCE IV ;  Start 

6/30/20 at 14:44;  Stop 6/30/20 at 14:45;  Status DC


Ephedrine Sulfate (ePHEDrine PF IN SALINE SYRINGE) 50 mg STK-MED ONCE IV ;  

Start 6/30/20 at 14:45;  Stop 6/30/20 at 14:45;  Status DC


Vasopressin 20 unit/Dextrose 101 ml @  12 mls/hr CONT  PRN IV SEE I/O RECORD 

Last administered on 7/5/20at 14:31;  Start 6/30/20 at 15:30


Sodium Chloride 1,000 ml @  1,000 mls/hr 1X  ONCE IV  Last administered on 

6/30/20at 15:42;  Start 6/30/20 at 15:45;  Stop 6/30/20 at 16:44;  Status DC


Albumin Human 500 ml @  125 mls/hr 1X  ONCE IV ;  Start 6/30/20 at 16:00;  Stop 

6/30/20 at 19:59;  Status DC


Albumin Human 500 ml @  125 mls/hr PRN Q1HR  PRN IV PER PROTOCOL;  Start 6/30/20

at 15:45


Magnesium Sulfate 50 ml @ 25 mls/hr 1X  ONCE IV  Last administered on 6/30/20at 

17:02;  Start 6/30/20 at 16:30;  Stop 6/30/20 at 18:29;  Status DC


Sodium Bicarbonate (Sodium Bicarb Adult 8.4% Syr) 50 meq STK-MED ONCE .ROUTE ;  

Start 6/30/20 at 16:20;  Stop 6/30/20 at 16:20;  Status DC


Sodium Bicarbonate (Sodium Bicarb Adult 8.4% Syr) 100 meq 1X  ONCE IV  Last 

administered on 6/30/20at 17:07;  Start 6/30/20 at 16:30;  Stop 6/30/20 at 

16:31;  Status DC


Sodium Bicarbonate 150 meq/Dextrose 1,150 ml @  75 mls/hr 1X  ONCE IV  Last 

administered on 6/30/20at 20:02;  Start 6/30/20 at 16:30;  Stop 7/1/20 at 07:49;

 Status DC


Sodium Chloride 80 meq/Potassium Chloride 30 meq/ Potassium Acetate 30 

meq/Magnesium Sulfate 14 meq/ Multivitamins 10 ml/Chromium/ Copper/Manganese/ 

Seleni/Zn 1 ml/ Insulin Human Regular 15 unit/ Total Parenteral Nutrition/Amino 

Acids/Dextrose/ Fat Emulsion Intravenous 1,920 ml @  80 mls/hr TPN  CONT IV  

Last administered on 7/1/20at 23:05;  Start 7/1/20 at 22:00;  Stop 7/2/20 at 

21:59;  Status DC


Sodium Chloride 100 meq/Potassium Chloride 30 meq/ Potassium Acetate 30 

meq/Magnesium Sulfate 12 meq/ Multivitamins 10 ml/Chromium/ Copper/Manganese/ 

Seleni/Zn 1 ml/ Insulin Human Regular 15 unit/ Total Parenteral Nutrition/Amino 

Acids/Dextrose/ Fat Emulsion Intravenous 1,920 ml @  80 mls/hr TPN  CONT IV  

Last administered on 7/2/20at 21:52;  Start 7/2/20 at 22:00;  Stop 7/3/20 at 

21:59;  Status DC


Sodium Chloride 100 meq/Potassium Chloride 30 meq/ Potassium Acetate 30 

meq/Magnesium Sulfate 12 meq/ Multivitamins 10 ml/Chromium/ Copper/Manganese/ 

Seleni/Zn 1 ml/ Insulin Human Regular 15 unit/ Total Parenteral Nutrition/Amino 

Acids/Dextrose/ Fat Emulsion Intravenous 1,920 ml @  80 mls/hr TPN  CONT IV  

Last administered on 7/3/20at 21:46;  Start 7/3/20 at 22:00;  Stop 7/4/20 at 

21:59;  Status DC


Sodium Chloride 100 meq/Potassium Chloride 30 meq/ Potassium Acetate 30 

meq/Magnesium Sulfate 12 meq/ Multivitamins 10 ml/Chromium/ Copper/Manganese/ 

Seleni/Zn 1 ml/ Insulin Human Regular 15 unit/ Total Parenteral Nutrition/Amino 

Acids/Dextrose/ Fat Emulsion Intravenous 1,800 ml @  75 mls/hr TPN  CONT IV  

Last administered on 7/4/20at 22:04;  Start 7/4/20 at 22:00;  Stop 7/5/20 at 

21:59


Fentanyl Citrate 55 ml @ 0 mls/hr CONT  PRN IV SEE COMMENTS Last administered on

7/4/20at 14:00;  Start 7/4/20 at 13:00


Sodium Chloride 100 meq/Potassium Chloride 30 meq/ Potassium Acetate 30 

meq/Magnesium Sulfate 12 meq/ Multivitamins 10 ml/Chromium/ Copper/Manganese/ 

Seleni/Zn 1 ml/ Insulin Human Regular 15 unit/ Total Parenteral Nutrition/Amino 

Acids/Dextrose/ Fat Emulsion Intravenous 1,680 ml @  70 mls/hr TPN  CONT IV ;  

Start 7/5/20 at 22:00;  Stop 7/6/20 at 21:59





Active Scripts


Active


Reported


Bisoprolol Fumarate 5 Mg Tablet 10 Mg PO DAILY


Vitals/I & O





Vital Sign - Last 24 Hours








 7/4/20 7/4/20 7/4/20 7/4/20





 16:00 16:00 17:00 17:03


 


Temp  99.0  





  99.0  


 


Pulse  86 88 


 


Resp  18 18 


 


B/P (MAP)  114/64 (81) 111/62 (78) 


 


Pulse Ox  100 100 99


 


O2 Delivery Mechanical Ventilator Ventilator Ventilator Ventilator


 


    





    





 7/4/20 7/4/20 7/4/20 7/4/20





 17:30 18:00 18:28 19:00


 


Pulse 86 82  88


 


Resp 17 17  21


 


B/P (MAP) 110/59 (76) 94/52 (66)  108/59 (75)


 


Pulse Ox 100 100 100 100


 


O2 Delivery Ventilator Ventilator Ventilator Ventilator





 7/4/20 7/4/20 7/4/20 7/4/20





 20:00 20:00 21:00 22:00


 


Temp 98.0   





 98.0   


 


Pulse 86  101 105


 


Resp 21  21 17


 


B/P (MAP) 110/55 (73)  93/50 (64) 85/43 (57)


 


Pulse Ox 100  100 100


 


O2 Delivery Ventilator Mechanical Ventilator Ventilator Ventilator


 


    





    





 7/4/20 7/4/20 7/5/20 7/5/20





 23:00 23:59 00:00 00:00


 


Temp    98.8





    98.8


 


Pulse 102   101


 


Resp 21   14


 


B/P (MAP) 98/48 (65)   101/51 (68)


 


Pulse Ox 100 100  100


 


O2 Delivery Ventilator Ventilator Mechanical Ventilator Ventilator


 


    





    





 7/5/20 7/5/20 7/5/20 7/5/20





 01:00 02:00 03:00 04:00


 


Pulse 96 96 93 


 


Resp 17 17 15 


 


B/P (MAP) 97/48 (64) 93/48 (63) 90/49 (63) 


 


Pulse Ox 100 100 100 


 


O2 Delivery Ventilator Ventilator Ventilator Mechanical Ventilator





 7/5/20 7/5/20 7/5/20 7/5/20





 04:00 04:16 05:00 06:00


 


Temp 98.7   





 98.7   


 


Pulse 96  102 95


 


Resp 15  15 15


 


B/P (MAP) 91/49 (63)  90/49 (63) 101/53 (69)


 


Pulse Ox 100 100 100 100


 


O2 Delivery Ventilator Ventilator Ventilator Ventilator


 


    





    





 7/5/20 7/5/20 7/5/20 7/5/20





 07:00 07:30 08:00 08:00


 


Temp   98.2 





   98.2 


 


Pulse 94 92 96 


 


Resp 14 13 14 


 


B/P (MAP) 96/48 (64) 95/53 (67) 81/48 (59) 


 


Pulse Ox 100 99 100 


 


O2 Delivery Ventilator Ventilator Ventilator Mechanical Ventilator


 


    





    





 7/5/20 7/5/20 7/5/20 7/5/20





 08:30 08:45 08:50 09:00


 


Pulse 92   88


 


Resp 14   12


 


B/P (MAP) 95/53 (67)   103/60 (74)


 


Pulse Ox 97 98 98 98


 


O2 Delivery Ventilator Ventilator Ventilator Ventilator





 7/5/20 7/5/20 7/5/20 7/5/20





 10:00 11:00 11:28 12:00


 


Pulse 92 90  


 


Resp 13 13  


 


B/P (MAP) 95/51 (66) 100/52 (68)  


 


Pulse Ox 98 100 99 


 


O2 Delivery Ventilator Ventilator Ventilator Mechanical Ventilator





 7/5/20 7/5/20 7/5/20 7/5/20





 12:00 13:00 14:00 15:13


 


Temp 98.2   





 98.2   


 


Pulse 82 84 88 


 


Resp 13 13 12 


 


B/P (MAP) 102/52 (69) 97/49 (65) 102/55 (71) 


 


Pulse Ox 100 100 100 100


 


O2 Delivery Ventilator Ventilator Ventilator Ventilator














Intake and Output   


 


 7/4/20 7/4/20 7/5/20





 14:59 22:59 06:59


 


Intake Total 100 ml 1902 ml 1422.2 ml


 


Output Total 785 ml 1067 ml 890 ml


 


Balance -685 ml 835 ml 532.2 ml











Justicifation of Admission Dx:


Justifications for Admission:


Justification of Admission Dx:  Yes











MG ARGUETA MD                  Jul 5, 2020 15:46

## 2020-07-05 NOTE — PDOC
Infectious Disease Note


Subjective


Subjective


Sedated 


Remains on ventilator support, FiO2 40% 


No fevers last 72 hrs


TPN


Still on vasopressin, off levophed





ROS


ROS


unobtainable





Vital Sign


Vital Signs





Vital Signs








  Date Time  Temp Pulse Resp B/P (MAP) Pulse Ox O2 Delivery O2 Flow Rate FiO2


 


7/5/20 08:50     98 Ventilator  


 


7/5/20 07:00  94 14 96/48 (64)    


 


7/5/20 04:00 98.7       





 98.7       











Physical Exam


PHYSICAL EXAM


GENERAL: Sedated, ill appearing


HEENT: Pupils equal, oral cavity dry. + NGT


NECK:  Tracheostomy 


LUNGS: Diminished aeration bases,  CT on left 


HEART:  S1, S2, regular w/ PVCs 


ABDOMEN: Distended,  bowel sounds hypoactive, soft, richardson x 2, 3 ROBERT drains, G-J

tube and + wound vac 


: [place] in place 


EXTREMITIES: Generalized edema, no cyanosis. SCDs & Podus boots bilaterally  


SKIN: warm touch. No signs of rash.  


LUE-PICC without signs of complications 


LUE art-line out, mottling left forearm, some better. RP palpable, cap refill 

brisk.





Labs


Lab





Laboratory Tests








Test


 7/4/20


11:50 7/4/20


18:09 7/5/20


00:20 7/5/20


05:57


 


Glucose (Fingerstick)


 149 mg/dL


(70-99) 127 mg/dL


(70-99) 126 mg/dL


(70-99) 120 mg/dL


(70-99)


 


Test


 7/5/20


06:00 


 


 





 


Sodium Level


 134 mmol/L


(136-145) 


 


 





 


Potassium Level


 4.7 mmol/L


(3.5-5.1) 


 


 





 


Chloride Level


 102 mmol/L


() 


 


 





 


Carbon Dioxide Level


 29 mmol/L


(21-32) 


 


 





 


Anion Gap 3 (6-14)    


 


Blood Urea Nitrogen


 21 mg/dL


(7-20) 


 


 





 


Creatinine


 0.6 mg/dL


(0.6-1.0) 


 


 





 


Estimated GFR


(Cockcroft-Gault) 106.3 


 


 


 





 


Glucose Level


 126 mg/dL


(70-99) 


 


 





 


Calcium Level


 8.8 mg/dL


(8.5-10.1) 


 


 











Micro


6/28. BLOOD CULTURE  Preliminary  


        NO GROWTH AFTER 5 DAYS                                 








6/30.  GRAM STAIN  Final  


        Final





        SQUAMOUS EPI CELL:RARE


        PMN (WBCs):FEW


        YEAST:FEW


        


 ANAEROBIC-AEROBIC CULTURE  Preliminary  


        Preliminary





       ANAEROBIC-AEROBIC CULTURE  Preliminary  


        Preliminary





        MANY YEAST on 07/03/20 at 1354


        FINAL ID= [NAHID PARAPSILOSIS]


        FEW


        FINAL ID= [PSEUDOMONAS AERUGINOSA]


        NAHID PARAPSILOSIS


        PSEUDOMONAS AERUGINOSA





Objective


Assessment


Patient with prolonged hospitalization more than 3 months


Multiple medical problems


Multiple surgical procedures





S/P Exp. Lap, REN, subtotal cholecystectomy with cholangiogram, G-J tube 

placement & pancreatic necrosectomy on June 30 C. parapsilosis & PSAE 


Leucocytosis - leukomoid reaction likely postoperative, improving 


Fever intermittently source likely GI, imrpoved 


Acute gallstone pancreatitis with persistent necrosis


  -CT a/p 4/9.  Increased ascites. Persistent evidence of necrotizing 

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


           - 4/27. status post ROBERT drain placement; C. parapsilosis. s/p drain 

5/6 + yeast & high amylase; s/p additional drain on 5/8. Drains removed. 


           -5/6. fluid  candida parapsilosis fluid, amylase high


           - 6/6 showed multiple pseudocysts, slight larger on the right. s/p 

drains x 3, 6/7.  + PSAE (MDRO-R Cefepime, Zosyn ANSON < 64) and yeast, 


           -6/7 s/p drain replacement x 3; fluid cult PSAE (MDRO), yeast; 

treated


Ascites s/p paracentesis 4/15 & 5/6. C. parapsilosis 


Cholelithiasis with thickening of the gallbladder wall.


JED, Hyperkalemia, Metabolic acidosis off dialysis


Acute hypoxic resp failure. trach/vent. sputum 6/13  + PSAE (I merrem) 


Pleural effusions s/p left thoracentesis, 5/12. no culture. s/p left chest tube,

6/15 no growth


Hypocalcemia 


Prediabetes


HTN


Anemia s/p PRBCs





Plan


Plan of Care


continue dapto, merrem and micafungin


Last CK 17


Monitor labs


f/u cultures/susceptibilities still pending 


wound care /drain management as directed


Monitor left forearm








Contact isolation for CRE/MDRO


Critically ill





D/w nursing 





Patient discussed in detail with NP. Chart reviewed in detail. Above plan co-

formulated and agreed upon with NP on 7/4/2020











HAVEN WINTERS         Jul 5, 2020 09:10


BISMARK HERNANDEZ MD              Jul 5, 2020 19:37

## 2020-07-06 VITALS — SYSTOLIC BLOOD PRESSURE: 106 MMHG | DIASTOLIC BLOOD PRESSURE: 57 MMHG

## 2020-07-06 VITALS — SYSTOLIC BLOOD PRESSURE: 135 MMHG | DIASTOLIC BLOOD PRESSURE: 83 MMHG

## 2020-07-06 VITALS — SYSTOLIC BLOOD PRESSURE: 124 MMHG | DIASTOLIC BLOOD PRESSURE: 76 MMHG

## 2020-07-06 VITALS — SYSTOLIC BLOOD PRESSURE: 111 MMHG | DIASTOLIC BLOOD PRESSURE: 70 MMHG

## 2020-07-06 VITALS — SYSTOLIC BLOOD PRESSURE: 156 MMHG | DIASTOLIC BLOOD PRESSURE: 100 MMHG

## 2020-07-06 VITALS — DIASTOLIC BLOOD PRESSURE: 97 MMHG | SYSTOLIC BLOOD PRESSURE: 165 MMHG

## 2020-07-06 VITALS — SYSTOLIC BLOOD PRESSURE: 104 MMHG | DIASTOLIC BLOOD PRESSURE: 52 MMHG

## 2020-07-06 VITALS — DIASTOLIC BLOOD PRESSURE: 67 MMHG | SYSTOLIC BLOOD PRESSURE: 118 MMHG

## 2020-07-06 VITALS — DIASTOLIC BLOOD PRESSURE: 63 MMHG | SYSTOLIC BLOOD PRESSURE: 101 MMHG

## 2020-07-06 VITALS — SYSTOLIC BLOOD PRESSURE: 108 MMHG | DIASTOLIC BLOOD PRESSURE: 68 MMHG

## 2020-07-06 VITALS — DIASTOLIC BLOOD PRESSURE: 58 MMHG | SYSTOLIC BLOOD PRESSURE: 106 MMHG

## 2020-07-06 VITALS — SYSTOLIC BLOOD PRESSURE: 126 MMHG | DIASTOLIC BLOOD PRESSURE: 76 MMHG

## 2020-07-06 VITALS — DIASTOLIC BLOOD PRESSURE: 70 MMHG | SYSTOLIC BLOOD PRESSURE: 127 MMHG

## 2020-07-06 VITALS — SYSTOLIC BLOOD PRESSURE: 96 MMHG | DIASTOLIC BLOOD PRESSURE: 63 MMHG

## 2020-07-06 VITALS — DIASTOLIC BLOOD PRESSURE: 70 MMHG | SYSTOLIC BLOOD PRESSURE: 121 MMHG

## 2020-07-06 VITALS — DIASTOLIC BLOOD PRESSURE: 54 MMHG | SYSTOLIC BLOOD PRESSURE: 92 MMHG

## 2020-07-06 VITALS — DIASTOLIC BLOOD PRESSURE: 56 MMHG | SYSTOLIC BLOOD PRESSURE: 96 MMHG

## 2020-07-06 VITALS — DIASTOLIC BLOOD PRESSURE: 67 MMHG | SYSTOLIC BLOOD PRESSURE: 105 MMHG

## 2020-07-06 VITALS — SYSTOLIC BLOOD PRESSURE: 94 MMHG | DIASTOLIC BLOOD PRESSURE: 51 MMHG

## 2020-07-06 VITALS — DIASTOLIC BLOOD PRESSURE: 83 MMHG | SYSTOLIC BLOOD PRESSURE: 133 MMHG

## 2020-07-06 VITALS — DIASTOLIC BLOOD PRESSURE: 81 MMHG | SYSTOLIC BLOOD PRESSURE: 136 MMHG

## 2020-07-06 VITALS — DIASTOLIC BLOOD PRESSURE: 72 MMHG | SYSTOLIC BLOOD PRESSURE: 124 MMHG

## 2020-07-06 VITALS — DIASTOLIC BLOOD PRESSURE: 84 MMHG | SYSTOLIC BLOOD PRESSURE: 149 MMHG

## 2020-07-06 LAB
ANION GAP SERPL CALC-SCNC: 3 MMOL/L (ref 6–14)
BASOPHILS # BLD AUTO: 0 X10^3/UL (ref 0–0.2)
BASOPHILS NFR BLD: 0 % (ref 0–3)
BUN SERPL-MCNC: 19 MG/DL (ref 7–20)
CALCIUM SERPL-MCNC: 9.2 MG/DL (ref 8.5–10.1)
CHLORIDE SERPL-SCNC: 103 MMOL/L (ref 98–107)
CO2 SERPL-SCNC: 29 MMOL/L (ref 21–32)
CREAT SERPL-MCNC: 0.5 MG/DL (ref 0.6–1)
EOSINOPHIL NFR BLD: 0.2 X10^3/UL (ref 0–0.7)
EOSINOPHIL NFR BLD: 1 % (ref 0–3)
ERYTHROCYTE [DISTWIDTH] IN BLOOD BY AUTOMATED COUNT: 14.5 % (ref 11.5–14.5)
GFR SERPLBLD BASED ON 1.73 SQ M-ARVRAT: 131.1 ML/MIN
GLUCOSE SERPL-MCNC: 155 MG/DL (ref 70–99)
HCT VFR BLD CALC: 23.4 % (ref 36–47)
HGB BLD-MCNC: 7.7 G/DL (ref 12–15.5)
LYMPHOCYTES # BLD: 1.2 X10^3/UL (ref 1–4.8)
LYMPHOCYTES NFR BLD AUTO: 7 % (ref 24–48)
MAGNESIUM SERPL-MCNC: 1.8 MG/DL (ref 1.8–2.4)
MCH RBC QN AUTO: 30 PG (ref 25–35)
MCHC RBC AUTO-ENTMCNC: 33 G/DL (ref 31–37)
MCV RBC AUTO: 89 FL (ref 79–100)
MONO #: 1 X10^3/UL (ref 0–1.1)
MONOCYTES NFR BLD: 6 % (ref 0–9)
NEUT #: 15.5 X10^3/UL (ref 1.8–7.7)
NEUTROPHILS NFR BLD AUTO: 86 % (ref 31–73)
PHOSPHATE SERPL-MCNC: 3.4 MG/DL (ref 2.6–4.7)
PLATELET # BLD AUTO: 375 X10^3/UL (ref 140–400)
POTASSIUM SERPL-SCNC: 4.5 MMOL/L (ref 3.5–5.1)
RBC # BLD AUTO: 2.62 X10^6/UL (ref 3.5–5.4)
SODIUM SERPL-SCNC: 135 MMOL/L (ref 136–145)
TRIGL SERPL-MCNC: 207 MG/DL (ref 0–150)
WBC # BLD AUTO: 18 X10^3/UL (ref 4–11)

## 2020-07-06 RX ADMIN — INSULIN LISPRO SCH UNITS: 100 INJECTION, SOLUTION INTRAVENOUS; SUBCUTANEOUS at 16:52

## 2020-07-06 RX ADMIN — IPRATROPIUM BROMIDE AND ALBUTEROL SULFATE SCH ML: .5; 3 SOLUTION RESPIRATORY (INHALATION) at 19:52

## 2020-07-06 RX ADMIN — IPRATROPIUM BROMIDE AND ALBUTEROL SULFATE SCH ML: .5; 3 SOLUTION RESPIRATORY (INHALATION) at 15:28

## 2020-07-06 RX ADMIN — MEROPENEM SCH MLS/HR: 500 INJECTION, POWDER, FOR SOLUTION INTRAVENOUS at 23:54

## 2020-07-06 RX ADMIN — Medication PRN EACH: at 12:17

## 2020-07-06 RX ADMIN — DAPTOMYCIN SCH MLS/HR: 500 INJECTION, POWDER, LYOPHILIZED, FOR SOLUTION INTRAVENOUS at 21:47

## 2020-07-06 RX ADMIN — INSULIN LISPRO SCH UNITS: 100 INJECTION, SOLUTION INTRAVENOUS; SUBCUTANEOUS at 06:26

## 2020-07-06 RX ADMIN — MEROPENEM SCH MLS/HR: 500 INJECTION, POWDER, FOR SOLUTION INTRAVENOUS at 16:52

## 2020-07-06 RX ADMIN — MEROPENEM SCH MLS/HR: 500 INJECTION, POWDER, FOR SOLUTION INTRAVENOUS at 12:46

## 2020-07-06 RX ADMIN — ENOXAPARIN SODIUM SCH MG: 40 INJECTION SUBCUTANEOUS at 08:39

## 2020-07-06 RX ADMIN — DEXTROSE SCH MLS/HR: 50 INJECTION, SOLUTION INTRAVENOUS at 08:39

## 2020-07-06 RX ADMIN — DEXTROSE PRN MLS/HR: 50 INJECTION, SOLUTION INTRAVENOUS at 17:27

## 2020-07-06 RX ADMIN — PANTOPRAZOLE SODIUM SCH MG: 40 INJECTION, POWDER, FOR SOLUTION INTRAVENOUS at 08:39

## 2020-07-06 RX ADMIN — MEROPENEM SCH MLS/HR: 500 INJECTION, POWDER, FOR SOLUTION INTRAVENOUS at 06:23

## 2020-07-06 RX ADMIN — ACETYLCYSTEINE SCH MG: 200 INHALANT RESPIRATORY (INHALATION) at 19:52

## 2020-07-06 RX ADMIN — INSULIN LISPRO SCH UNITS: 100 INJECTION, SOLUTION INTRAVENOUS; SUBCUTANEOUS at 12:46

## 2020-07-06 RX ADMIN — IPRATROPIUM BROMIDE AND ALBUTEROL SULFATE SCH ML: .5; 3 SOLUTION RESPIRATORY (INHALATION) at 08:00

## 2020-07-06 RX ADMIN — IPRATROPIUM BROMIDE AND ALBUTEROL SULFATE SCH ML: .5; 3 SOLUTION RESPIRATORY (INHALATION) at 11:34

## 2020-07-06 RX ADMIN — Medication PRN MLS/HR: at 23:55

## 2020-07-06 RX ADMIN — IPRATROPIUM BROMIDE AND ALBUTEROL SULFATE SCH ML: .5; 3 SOLUTION RESPIRATORY (INHALATION) at 03:44

## 2020-07-06 RX ADMIN — ACETYLCYSTEINE SCH MG: 200 INHALANT RESPIRATORY (INHALATION) at 08:00

## 2020-07-06 NOTE — NUR
SS following up with discharge planning. SS reviewed pt chart and discussed with pt RN. No 
new changes at this time. Pt remains on the vent at this time. Pt continues on TPN, IV 
Meropenem, Micafungin, and Daptomycin. Pt continues to have all drains and tubes to include 
three ROBERT drains, two Avi drains, and chest tube. Pt remains Full Code. SS will continue 
to follow for discharge planning.

## 2020-07-06 NOTE — PDOC
SURGICAL PROGRESS NOTE


Subjective


in bed, tolerating TF


+ stool yesterday


Vital Signs





Vital Signs








  Date Time  Temp Pulse Resp B/P (MAP) Pulse Ox O2 Delivery O2 Flow Rate FiO2


 


7/6/20 11:34     99 Ventilator  


 


7/6/20 11:00  74 22 136/81 (99)    


 


7/6/20 08:00 97.8       





 97.8       








I&O











Intake and Output 


 


 7/6/20





 07:00


 


Intake Total 2975 ml


 


Output Total 3130 ml


 


Balance -155 ml


 


 


 


IV Total 2305 ml


 


Tube Feeding 420 ml


 


Other 250 ml


 


Output Urine Total 1890 ml


 


Chest Tube Drainage Total 100 ml


 


Drainage Total 1140 ml








PATIENT HAS A VILLASENOR:  Yes


General:  Cooperative, No acute distress


Abdomen:  Soft, Other (drains in place)


Labs





Laboratory Tests








Test


 7/4/20


18:09 7/5/20


00:20 7/5/20


05:57 7/5/20


06:00


 


Glucose (Fingerstick)


 127 mg/dL


(70-99) 126 mg/dL


(70-99) 120 mg/dL


(70-99) 





 


Sodium Level


 


 


 


 134 mmol/L


(136-145)


 


Potassium Level


 


 


 


 4.7 mmol/L


(3.5-5.1)


 


Chloride Level


 


 


 


 102 mmol/L


()


 


Carbon Dioxide Level


 


 


 


 29 mmol/L


(21-32)


 


Anion Gap    3 (6-14) 


 


Blood Urea Nitrogen


 


 


 


 21 mg/dL


(7-20)


 


Creatinine


 


 


 


 0.6 mg/dL


(0.6-1.0)


 


Estimated GFR


(Cockcroft-Gault) 


 


 


 106.3 





 


Glucose Level


 


 


 


 126 mg/dL


(70-99)


 


Calcium Level


 


 


 


 8.8 mg/dL


(8.5-10.1)


 


Test


 7/5/20


12:49 7/5/20


18:06 7/5/20


23:53 7/6/20


06:15


 


Glucose (Fingerstick)


 169 mg/dL


(70-99) 132 mg/dL


(70-99) 147 mg/dL


(70-99) 





 


White Blood Count


 


 


 


 18.0 x10^3/uL


(4.0-11.0)


 


Red Blood Count


 


 


 


 2.62 x10^6/uL


(3.50-5.40)


 


Hemoglobin


 


 


 


 7.7 g/dL


(12.0-15.5)


 


Hematocrit


 


 


 


 23.4 %


(36.0-47.0)


 


Mean Corpuscular Volume    89 fL () 


 


Mean Corpuscular Hemoglobin    30 pg (25-35) 


 


Mean Corpuscular Hemoglobin


Concent 


 


 


 33 g/dL


(31-37)


 


Red Cell Distribution Width


 


 


 


 14.5 %


(11.5-14.5)


 


Platelet Count


 


 


 


 375 x10^3/uL


(140-400)


 


Neutrophils (%) (Auto)    86 % (31-73) 


 


Lymphocytes (%) (Auto)    7 % (24-48) 


 


Monocytes (%) (Auto)    6 % (0-9) 


 


Eosinophils (%) (Auto)    1 % (0-3) 


 


Basophils (%) (Auto)    0 % (0-3) 


 


Neutrophils # (Auto)


 


 


 


 15.5 x10^3/uL


(1.8-7.7)


 


Lymphocytes # (Auto)


 


 


 


 1.2 x10^3/uL


(1.0-4.8)


 


Monocytes # (Auto)


 


 


 


 1.0 x10^3/uL


(0.0-1.1)


 


Eosinophils # (Auto)


 


 


 


 0.2 x10^3/uL


(0.0-0.7)


 


Basophils # (Auto)


 


 


 


 0.0 x10^3/uL


(0.0-0.2)


 


Sodium Level


 


 


 


 135 mmol/L


(136-145)


 


Potassium Level


 


 


 


 4.5 mmol/L


(3.5-5.1)


 


Chloride Level


 


 


 


 103 mmol/L


()


 


Carbon Dioxide Level


 


 


 


 29 mmol/L


(21-32)


 


Anion Gap    3 (6-14) 


 


Blood Urea Nitrogen


 


 


 


 19 mg/dL


(7-20)


 


Creatinine


 


 


 


 0.5 mg/dL


(0.6-1.0)


 


Estimated GFR


(Cockcroft-Gault) 


 


 


 131.1 





 


Glucose Level


 


 


 


 155 mg/dL


(70-99)


 


Calcium Level


 


 


 


 9.2 mg/dL


(8.5-10.1)


 


Phosphorus Level


 


 


 


 3.4 mg/dL


(2.6-4.7)


 


Magnesium Level


 


 


 


 1.8 mg/dL


(1.8-2.4)


 


Triglycerides Level


 


 


 


 207 mg/dL


(0-150)


 


Test


 7/6/20


06:21 7/6/20


11:56 


 





 


Glucose (Fingerstick)


 151 mg/dL


(70-99) 154 mg/dL


(70-99) 


 











Laboratory Tests








Test


 7/5/20


12:49 7/5/20


18:06 7/5/20


23:53 7/6/20


06:15


 


Glucose (Fingerstick)


 169 mg/dL


(70-99) 132 mg/dL


(70-99) 147 mg/dL


(70-99) 





 


White Blood Count


 


 


 


 18.0 x10^3/uL


(4.0-11.0)


 


Red Blood Count


 


 


 


 2.62 x10^6/uL


(3.50-5.40)


 


Hemoglobin


 


 


 


 7.7 g/dL


(12.0-15.5)


 


Hematocrit


 


 


 


 23.4 %


(36.0-47.0)


 


Mean Corpuscular Volume    89 fL () 


 


Mean Corpuscular Hemoglobin    30 pg (25-35) 


 


Mean Corpuscular Hemoglobin


Concent 


 


 


 33 g/dL


(31-37)


 


Red Cell Distribution Width


 


 


 


 14.5 %


(11.5-14.5)


 


Platelet Count


 


 


 


 375 x10^3/uL


(140-400)


 


Neutrophils (%) (Auto)    86 % (31-73) 


 


Lymphocytes (%) (Auto)    7 % (24-48) 


 


Monocytes (%) (Auto)    6 % (0-9) 


 


Eosinophils (%) (Auto)    1 % (0-3) 


 


Basophils (%) (Auto)    0 % (0-3) 


 


Neutrophils # (Auto)


 


 


 


 15.5 x10^3/uL


(1.8-7.7)


 


Lymphocytes # (Auto)


 


 


 


 1.2 x10^3/uL


(1.0-4.8)


 


Monocytes # (Auto)


 


 


 


 1.0 x10^3/uL


(0.0-1.1)


 


Eosinophils # (Auto)


 


 


 


 0.2 x10^3/uL


(0.0-0.7)


 


Basophils # (Auto)


 


 


 


 0.0 x10^3/uL


(0.0-0.2)


 


Sodium Level


 


 


 


 135 mmol/L


(136-145)


 


Potassium Level


 


 


 


 4.5 mmol/L


(3.5-5.1)


 


Chloride Level


 


 


 


 103 mmol/L


()


 


Carbon Dioxide Level


 


 


 


 29 mmol/L


(21-32)


 


Anion Gap    3 (6-14) 


 


Blood Urea Nitrogen


 


 


 


 19 mg/dL


(7-20)


 


Creatinine


 


 


 


 0.5 mg/dL


(0.6-1.0)


 


Estimated GFR


(Cockcroft-Gault) 


 


 


 131.1 





 


Glucose Level


 


 


 


 155 mg/dL


(70-99)


 


Calcium Level


 


 


 


 9.2 mg/dL


(8.5-10.1)


 


Phosphorus Level


 


 


 


 3.4 mg/dL


(2.6-4.7)


 


Magnesium Level


 


 


 


 1.8 mg/dL


(1.8-2.4)


 


Triglycerides Level


 


 


 


 207 mg/dL


(0-150)


 


Test


 7/6/20


06:21 7/6/20


11:56 


 





 


Glucose (Fingerstick)


 151 mg/dL


(70-99) 154 mg/dL


(70-99) 


 











Problem List


Problems


Medical Problems:


(1) Acute pancreatitis


Status: Acute  





(2) Cholelithiasis


Status: Acute  








Assessment/Plan


will begin increasing TFs





Justicifation of Admission Dx:


Justifications for Admission:


Justification of Admission Dx:  Yes











SAURAV NASH APRN             Jul 6, 2020 12:27

## 2020-07-06 NOTE — PDOC
Infectious Disease Note


Subjective


Subjective


Sedated 


Remains on ventilator support, FiO2 40% 5 PEEP


No fevers last 72 hrs


TPN


Still on vasopressin, off levophed





ROS


ROS


Unable to obtain





Vital Sign


Vital Signs





Vital Signs








  Date Time  Temp Pulse Resp B/P (MAP) Pulse Ox O2 Delivery O2 Flow Rate FiO2


 


7/6/20 06:00  80 22 106/57 (73) 100 Ventilator  


 


7/6/20 04:00 98.8       





 98.8       











Physical Exam


PHYSICAL EXAM


GENERAL: Sedated, ill appearing


HEENT: Pupils equal, oral cavity dry. + NGT


NECK:  Tracheostomy 


LUNGS: Diminished aeration bases,  CT on left 


HEART:  S1, S2, regular w/ PVCs 


ABDOMEN: Sightly Distended,  bowel sounds hypoactive, soft, richardson x 2, 3 ROBERT 

drains, G-J tube and + wound vac 


: [place] in place 


EXTREMITIES: Generalized edema, no cyanosis. SCDs & Podus boots bilaterally  


SKIN: warm touch. No signs of rash.  


LUE-PICC without signs of complications 


LUE art-line out, mottling left forearm, some better. RP palpable, cap refill 

brisk.





Labs


Lab





Laboratory Tests








Test


 7/5/20


12:49 7/5/20


18:06 7/5/20


23:53 7/6/20


06:15


 


Glucose (Fingerstick)


 169 mg/dL


(70-99) 132 mg/dL


(70-99) 147 mg/dL


(70-99) 





 


White Blood Count


 


 


 


 18.0 x10^3/uL


(4.0-11.0)


 


Red Blood Count


 


 


 


 2.62 x10^6/uL


(3.50-5.40)


 


Hemoglobin


 


 


 


 7.7 g/dL


(12.0-15.5)


 


Hematocrit


 


 


 


 23.4 %


(36.0-47.0)


 


Mean Corpuscular Volume    89 fL () 


 


Mean Corpuscular Hemoglobin    30 pg (25-35) 


 


Mean Corpuscular Hemoglobin


Concent 


 


 


 33 g/dL


(31-37)


 


Red Cell Distribution Width


 


 


 


 14.5 %


(11.5-14.5)


 


Platelet Count


 


 


 


 375 x10^3/uL


(140-400)


 


Neutrophils (%) (Auto)    86 % (31-73) 


 


Lymphocytes (%) (Auto)    7 % (24-48) 


 


Monocytes (%) (Auto)    6 % (0-9) 


 


Eosinophils (%) (Auto)    1 % (0-3) 


 


Basophils (%) (Auto)    0 % (0-3) 


 


Neutrophils # (Auto)


 


 


 


 15.5 x10^3/uL


(1.8-7.7)


 


Lymphocytes # (Auto)


 


 


 


 1.2 x10^3/uL


(1.0-4.8)


 


Monocytes # (Auto)


 


 


 


 1.0 x10^3/uL


(0.0-1.1)


 


Eosinophils # (Auto)


 


 


 


 0.2 x10^3/uL


(0.0-0.7)


 


Basophils # (Auto)


 


 


 


 0.0 x10^3/uL


(0.0-0.2)


 


Sodium Level


 


 


 


 135 mmol/L


(136-145)


 


Potassium Level


 


 


 


 4.5 mmol/L


(3.5-5.1)


 


Chloride Level


 


 


 


 103 mmol/L


()


 


Carbon Dioxide Level


 


 


 


 29 mmol/L


(21-32)


 


Anion Gap    3 (6-14) 


 


Blood Urea Nitrogen


 


 


 


 19 mg/dL


(7-20)


 


Creatinine


 


 


 


 0.5 mg/dL


(0.6-1.0)


 


Estimated GFR


(Cockcroft-Gault) 


 


 


 131.1 





 


Glucose Level


 


 


 


 155 mg/dL


(70-99)


 


Calcium Level


 


 


 


 9.2 mg/dL


(8.5-10.1)


 


Phosphorus Level


 


 


 


 3.4 mg/dL


(2.6-4.7)


 


Magnesium Level


 


 


 


 1.8 mg/dL


(1.8-2.4)


 


Triglycerides Level


 


 


 


 207 mg/dL


(0-150)


 


Test


 7/6/20


06:21 


 


 





 


Glucose (Fingerstick)


 151 mg/dL


(70-99) 


 


 











Micro


6/7 





GRAM STAIN  Final  


        Final





        GRAM NEGATIVE RODS:MODERATE


        SQUAMOUS EPI CELL:NOT APPLICABLE


        PMN (WBCs):RARE


        YEAST:MODERATE


        Unless otherwise specified, Testing Performed by:


        [address] [address]


        For Inquires, the Physician may contact the Microbiology


        department at [phone]





  ANAEROBIC-AEROBIC CULTURE  Preliminary  


        Preliminary





        MANY GRAM NEGATIVE RODS on 06/09/20 at 1158


        FINAL ID= [PSEUDOMONAS AERUGINOSA]


        PSEUDOMONAS AERUGINOSA





  ANTIMICROBIAL SUSCEPTIBILITY  Preliminary  


        Comment





        NEG ANSON 56


        PSEUDOMONAS AERUGINOSA


        ANTIBIOTIC                        RESULT          INTERPRETATION


        AMIKACIN                          <=16                  S


        AZTREONAM                         >16                   R


        CEFTAZIDIME                       >16                   R


        CIPROFLOXACIN                     <=0.25                S


        CEFEPIME                          16                    I


        CEFTAZIDIME/AVIBACTAM             <=4                   S


        GENTAMICIN                        <=2                   S














                               ** CONTINUED ON NEXT PAGE **





 

--------------------------------------------------------------------------------


------------





RUN DATE: 06/11/20                  [facility] *LIVE*               

  PAGE 2   


RUN TIME: 1016                            Specimen Inquiry                    


 

--------------------------------------------------------------------------------


------------





SPEC: [id number]    PATIENT: JESENIA BEAN                [id number]  

(Continued)


-----------------------------------------

---------------------------------------------------








--------------------------------------------------------------------------

------------------





  Procedure                         Result                                      

         


-----------------

---------------------------------------------------------------------------





  ANTIMICROBIAL SUSCEPTIBILITY  Preliminary   (continued)


        LEVOFLOXACIN                      <=0.5                 S


        MEROPENEM                         <=1                   S


        PIPERACILLIN/TAZOBACTAM           64                    S


        TOBRAMYCIN                        <=2                   S


        Unless otherwise specified, Testing Performed by:


        [facility]


        [address] [place] [address]


        For Inquires, the Physician may contact the Microbiology


        department at [phone]











CT Scan 6/6





IMPRESSION:


1. Removal of the percutaneous pigtail drainage catheters since the prior 


exam. Sequela of pancreatitis with extensive pseudocysts again 


demonstrated, the right-sided collections are slightly larger since the 


prior exam, the left-sided collections are stable. See above.


2. Moderate to large left pleural effusion with atelectasis and collapse 


of most of the left lower lobe, stable. Small right pleural effusion is 


stable.


3. Gallstone.





Objective


Assessment


Patient with prolonged hospitalization more than 3 months


Multiple medical problems


Multiple surgical procedures





S/P Exp. Lap, REN, subtotal cholecystectomy with cholangiogram, G-J tube 

placement & pancreatic necrosectomy on June 30 YEAST/PSA 


Leucocytosis - leukomoid reaction likely postoperative - improving


Fever intermittently source likely GI


Acute gallstone pancreatitis with persistent necrosis


  -CT a/p 4/9.  Increased ascites. Persistent evidence of necrotizing 

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


           - 4/27. status post ROBERT drain placement; C. parapsilosis. s/p drain 

5/6 + yeast & high amylase; s/p additional drain on 5/8. Drains removed. 


           -5/6. fluid  candida parapsilosis fluid, amylase high


           - 6/6 showed multiple pseudocysts, slight larger on the right. s/p 

drains x 3, 6/7.  + PSAE (MDRO-R Cefepime, Zosyn ANSON < 64) and yeast, 


           -6/7 s/p drain replacement x 3; fluid cult PSAE (MDRO), yeast; 

treated


Ascites s/p paracentesis 4/15 & 5/6. C. parapsilosis 


Cholelithiasis with thickening of the gallbladder wall.


JED, Hyperkalemia, Metabolic acidosis off dialysis


Acute hypoxic resp failure. trach/vent. sputum 6/13  + PSAE (I merrem) 


Pleural effusions s/p left thoracentesis, 5/12. no culture. s/p left chest tube,

6/15 no growth


Hypocalcemia 


Prediabetes


HTN


Anemia s/p RBCs





Plan


Plan of Care





PSA from 6/30 I to Meropenem but she is AF and WBC improving so will try to hold

changing abx to Cipro and or Avycaz to try and save potential abx options


continue dapto, merrem and micafungin


Last CK 17


Monitor labs


f/u cultures/susceptibilities still pending 


wound care /drain management as directed


Monitor left forearm








Contact isolation for CRE/MDRO


Critically ill





D/w nursing











RASHAWN ROSEN MD               Jul 6, 2020 07:44

## 2020-07-06 NOTE — PDOC
Objective:


Objective:


D/w nurse - stooled yesterday, otherwise stable.


Note plans to increase tube feeds.


Vital Signs:





                                   Vital Signs








  Date Time  Temp Pulse Resp B/P (MAP) Pulse Ox O2 Delivery O2 Flow Rate FiO2


 


7/6/20 11:34     99 Ventilator  


 


7/6/20 11:00  74 22 136/81 (99)    


 


7/6/20 08:00 97.8       





 97.8       








Labs:





Laboratory Tests








Test


 7/5/20


18:06 7/5/20


23:53 7/6/20


06:21 7/6/20


11:56


 


Glucose (Fingerstick)


 132 mg/dL


(70-99) 147 mg/dL


(70-99) 151 mg/dL


(70-99) 154 mg/dL


(70-99)





Please see EMR for additional labs - unable to include in note at this time.





PE:





GEN: intubated


LUNGS: vent


ABD: drains including NG w/ bilious fluid


NEURO/PSYCH: sedated





A/P:


Severe gallstone pancreatitis s/p subtotal cholecystectomy, gastrojejunostomy 

placement, pancreatic necrosectomy





--


***Seen this morning when [employer] unavailable**


Continue support per GI.





Justicifation of Admission Dx:


Justifications for Admission:


Justification of Admission Dx:  Yes











RAGHAVENDRA MURCIA          Jul 6, 2020 12:57

## 2020-07-06 NOTE — PATHOLOGY
[facility] Accession Number: [id number]

.                                                                01

Material submitted:                                        .

PART A: pancreas - PANCREATIC NECROSECTOMY

PART B: gallbladder - GALLBLADDER AND CONTENTS

.                                                                01

Clinical history:                                          .

Cholecystitis, cholelithiasis, pancreatitis, sepsis

.                                                                02

**********************************************************************

Diagnosis:

A.  Pancreatic necrosectomy:

- Consistent with acute necrotizing and focal chronic pancreatitis.  See

comment.

.

B.  Gallbladder, open cholecystectomy:

- Cholelithiasis.

- Chronic and focal mild acute cholecystitis.

(JPM:alex; 07/06/2020)

S  07/06/2020  1432 Local

**********************************************************************

.                                                                02

Comment:

Section of the pancreatic necrosectomy reveal segments of necrotic tissue

showing extensive fat necrosis with focal hemorrhage and acute

inflammation.  There are also a few segments of reactive fibrous tissue

showing chronic inflammation.  There is no recognizable pancreatic

parenchyma.  The findings are consistent with an acute necrotizing and

focal chronic pancreatitis.  There is no evidence of malignancy.

(JPM:alex; 07/06/2020)

.                                                                02

Electronically signed:                                     .

Jonah Tello MD, Pathologist

NPI- [id number]

.                                                                01

Gross description:                                         .

A.  The specimen is received in formalin, labeled "Edel Krishna,

pancreatic necrosectomy".  Received is a 20.2 x 17.8 x 4.7 cm aggregate of

red-brown friable material admixed with blood coagulum, pale tan

possible pancreatic tissue, and bright yellow lobulated tissue.  Sectioning

through the possible pancreatic tissue reveals pale-tan, friable to

necrotic cut surfaces.  The specimen is submitted representatively in

cassettes A1 through A4.

.

B.  The specimen is received in formalin, labeled "Edel Krishna,

gallbladder and contents".  Received is a previously opened gallbladder

measuring 5.5 x 4.0 x 2.8 cm in greatest dimensions displaying a gray-tan

serosal surface.  The gallbladder neck and proximal margin is not

discernible grossly.  Opening the specimen reveals a velvety, light tan to

necrotic-appearing mucosa with a gallbladder wall thickness of 0.1 cm.

Calculi are present displaying a black and nodular appearance, and no

masses or lesions are noted grossly.  The specimen is submitted

representatively in cassette B1.

(CAA; 7/2/2020)

QAC/QAC  07/06/2020  1429 Local

.                                                                02

Pathologist provided ICD-10:

K85.91, K80.12

.                                                                02

CPT                                                        .

087311, [id number]

Specimen Comment: A courtesy copy of this report has been sent to [phone], [phone]

Specimen Comment: [id number], [phone]

Specimen Comment: Report sent to ,DR MASON / DR HERNANDEZ

***Performed at:  01

   [facility]

   [address], [place]  [id number]

   MD Duke Lozano MD Phone:  [phone]

***Performed at:  02

   [facility]

   [address] [place], [place]  [id number]

   MD Jonah Tello MD Phone:  [phone]

## 2020-07-06 NOTE — PDOC
PROGRESS NOTES


Assessment


Problems


Medical Problems:


(1) Acute pancreatitis


Status: Acute  





(2) Cholelithiasis


Status: Acute  





Critical illness neuromyopathy


Single seizure on 3/6, no recurrence.


Metabolic encephalopathy.


Fevers.


Metabolic acidosis.


Diffuse pulmonary infiltrate.


Pleural effusion.


Pancreatitis, necrotizing.


Gallstone.


Leukocytosis.


Lymphopenia.


Electrolytes imbalances.


Hyperglycemia.


DM.


HTN.


HLD.


Anemia.


Abnormal CXR.


Obesity.


Ascites and pleural effusion


Ileus with vomiting


Anemia 


JED


Hyperkalemia


Metabolic acidosis


Hypertension


S/P trache, back on vent


Anemia


S/P IR drain placement, 6/7


Hypotension, intermittently requiring Levophed


She had exploratory laparotomy, lysis of adhesions, subtotal cholecystectomy 

with cholangiogram, gastrojejunostomy tube placement, pancreatic necrosectomy on

6/30


Plan


Keppra if she has further seizures.


Treat medical diseases.


Subjective


None


Objective





Vital Signs








  Date Time  Temp Pulse Resp B/P (MAP) Pulse Ox O2 Delivery O2 Flow Rate FiO2


 


7/6/20 08:07     100 Ventilator  


 


7/6/20 07:00  76 22 126/76 (93)    


 


7/6/20 04:00 98.8       





 98.8       














Intake and Output 


 


 7/6/20





 07:00


 


Intake Total 2975 ml


 


Output Total 3130 ml


 


Balance -155 ml


 


 


 


IV Total 2305 ml


 


Tube Feeding 420 ml


 


Other 250 ml


 


Output Urine Total 1890 ml


 


Chest Tube Drainage Total 100 ml


 


Drainage Total 1140 ml








PHYSICAL EXAM


Sedated, on vent


PERRL.


EOMI.


CN: no focal findings.


Muscle tone: normal.


Muscle strength:  minimal pain response


DTR: 1+


Plantar reflex: Silent


Gait: not examined in bed.


Sensory exam:  not testable


Cerebellar:  not testable


Review of Relevant


I have reviewed the following items josy (where applicable) has been applied.


Labs





Laboratory Tests








Test


 7/4/20


11:50 7/4/20


18:09 7/5/20


00:20 7/5/20


05:57


 


Glucose (Fingerstick)


 149 mg/dL


(70-99) 127 mg/dL


(70-99) 126 mg/dL


(70-99) 120 mg/dL


(70-99)


 


Test


 7/5/20


06:00 7/5/20


12:49 7/5/20


18:06 7/5/20


23:53


 


Sodium Level


 134 mmol/L


(136-145) 


 


 





 


Potassium Level


 4.7 mmol/L


(3.5-5.1) 


 


 





 


Chloride Level


 102 mmol/L


() 


 


 





 


Carbon Dioxide Level


 29 mmol/L


(21-32) 


 


 





 


Anion Gap 3 (6-14)    


 


Blood Urea Nitrogen


 21 mg/dL


(7-20) 


 


 





 


Creatinine


 0.6 mg/dL


(0.6-1.0) 


 


 





 


Estimated GFR


(Cockcroft-Gault) 106.3 


 


 


 





 


Glucose Level


 126 mg/dL


(70-99) 


 


 





 


Calcium Level


 8.8 mg/dL


(8.5-10.1) 


 


 





 


Glucose (Fingerstick)


 


 169 mg/dL


(70-99) 132 mg/dL


(70-99) 147 mg/dL


(70-99)


 


Test


 7/6/20


06:15 7/6/20


06:21 


 





 


White Blood Count


 18.0 x10^3/uL


(4.0-11.0) 


 


 





 


Red Blood Count


 2.62 x10^6/uL


(3.50-5.40) 


 


 





 


Hemoglobin


 7.7 g/dL


(12.0-15.5) 


 


 





 


Hematocrit


 23.4 %


(36.0-47.0) 


 


 





 


Mean Corpuscular Volume 89 fL ()    


 


Mean Corpuscular Hemoglobin 30 pg (25-35)    


 


Mean Corpuscular Hemoglobin


Concent 33 g/dL


(31-37) 


 


 





 


Red Cell Distribution Width


 14.5 %


(11.5-14.5) 


 


 





 


Platelet Count


 375 x10^3/uL


(140-400) 


 


 





 


Neutrophils (%) (Auto) 86 % (31-73)    


 


Lymphocytes (%) (Auto) 7 % (24-48)    


 


Monocytes (%) (Auto) 6 % (0-9)    


 


Eosinophils (%) (Auto) 1 % (0-3)    


 


Basophils (%) (Auto) 0 % (0-3)    


 


Neutrophils # (Auto)


 15.5 x10^3/uL


(1.8-7.7) 


 


 





 


Lymphocytes # (Auto)


 1.2 x10^3/uL


(1.0-4.8) 


 


 





 


Monocytes # (Auto)


 1.0 x10^3/uL


(0.0-1.1) 


 


 





 


Eosinophils # (Auto)


 0.2 x10^3/uL


(0.0-0.7) 


 


 





 


Basophils # (Auto)


 0.0 x10^3/uL


(0.0-0.2) 


 


 





 


Sodium Level


 135 mmol/L


(136-145) 


 


 





 


Potassium Level


 4.5 mmol/L


(3.5-5.1) 


 


 





 


Chloride Level


 103 mmol/L


() 


 


 





 


Carbon Dioxide Level


 29 mmol/L


(21-32) 


 


 





 


Anion Gap 3 (6-14)    


 


Blood Urea Nitrogen


 19 mg/dL


(7-20) 


 


 





 


Creatinine


 0.5 mg/dL


(0.6-1.0) 


 


 





 


Estimated GFR


(Cockcroft-Gault) 131.1 


 


 


 





 


Glucose Level


 155 mg/dL


(70-99) 


 


 





 


Calcium Level


 9.2 mg/dL


(8.5-10.1) 


 


 





 


Phosphorus Level


 3.4 mg/dL


(2.6-4.7) 


 


 





 


Magnesium Level


 1.8 mg/dL


(1.8-2.4) 


 


 





 


Triglycerides Level


 207 mg/dL


(0-150) 


 


 





 


Glucose (Fingerstick)


 


 151 mg/dL


(70-99) 


 











Laboratory Tests








Test


 7/5/20


12:49 7/5/20


18:06 7/5/20


23:53 7/6/20


06:15


 


Glucose (Fingerstick)


 169 mg/dL


(70-99) 132 mg/dL


(70-99) 147 mg/dL


(70-99) 





 


White Blood Count


 


 


 


 18.0 x10^3/uL


(4.0-11.0)


 


Red Blood Count


 


 


 


 2.62 x10^6/uL


(3.50-5.40)


 


Hemoglobin


 


 


 


 7.7 g/dL


(12.0-15.5)


 


Hematocrit


 


 


 


 23.4 %


(36.0-47.0)


 


Mean Corpuscular Volume    89 fL () 


 


Mean Corpuscular Hemoglobin    30 pg (25-35) 


 


Mean Corpuscular Hemoglobin


Concent 


 


 


 33 g/dL


(31-37)


 


Red Cell Distribution Width


 


 


 


 14.5 %


(11.5-14.5)


 


Platelet Count


 


 


 


 375 x10^3/uL


(140-400)


 


Neutrophils (%) (Auto)    86 % (31-73) 


 


Lymphocytes (%) (Auto)    7 % (24-48) 


 


Monocytes (%) (Auto)    6 % (0-9) 


 


Eosinophils (%) (Auto)    1 % (0-3) 


 


Basophils (%) (Auto)    0 % (0-3) 


 


Neutrophils # (Auto)


 


 


 


 15.5 x10^3/uL


(1.8-7.7)


 


Lymphocytes # (Auto)


 


 


 


 1.2 x10^3/uL


(1.0-4.8)


 


Monocytes # (Auto)


 


 


 


 1.0 x10^3/uL


(0.0-1.1)


 


Eosinophils # (Auto)


 


 


 


 0.2 x10^3/uL


(0.0-0.7)


 


Basophils # (Auto)


 


 


 


 0.0 x10^3/uL


(0.0-0.2)


 


Sodium Level


 


 


 


 135 mmol/L


(136-145)


 


Potassium Level


 


 


 


 4.5 mmol/L


(3.5-5.1)


 


Chloride Level


 


 


 


 103 mmol/L


()


 


Carbon Dioxide Level


 


 


 


 29 mmol/L


(21-32)


 


Anion Gap    3 (6-14) 


 


Blood Urea Nitrogen


 


 


 


 19 mg/dL


(7-20)


 


Creatinine


 


 


 


 0.5 mg/dL


(0.6-1.0)


 


Estimated GFR


(Cockcroft-Gault) 


 


 


 131.1 





 


Glucose Level


 


 


 


 155 mg/dL


(70-99)


 


Calcium Level


 


 


 


 9.2 mg/dL


(8.5-10.1)


 


Phosphorus Level


 


 


 


 3.4 mg/dL


(2.6-4.7)


 


Magnesium Level


 


 


 


 1.8 mg/dL


(1.8-2.4)


 


Triglycerides Level


 


 


 


 207 mg/dL


(0-150)


 


Test


 7/6/20


06:21 


 


 





 


Glucose (Fingerstick)


 151 mg/dL


(70-99) 


 


 











Microbiology


6/30/20 Gram Stain - Final, Resulted


          


6/30/20 Aerobic and Anaerobic Culture - Preliminary, Resulted


          


6/30/20 Antimicrobic Susceptibility - Preliminary, Resulted


          


6/28/20 Blood Culture - Final, Complete


          NO GROWTH AFTER 5 DAYS


6/15/20 Gram Stain - Final, Complete


          


6/15/20 Aerobic and Anaerobic Culture - Final, Complete


          


6/13/20 Gram Stain Evaluation - Final, Complete


          


6/13/20 Respiratory Culture - Final, Complete


          


6/13/20 Antimicrobic Susceptibility - Final, Complete


          


6/7/20 Urine Culture - Final, Complete


         


5/30/20 Gram Stain - Final, Complete


          


5/30/20 Aerobic Culture - Final, Complete


Medications





Current Medications


Sodium Chloride 1,000 ml @  1,000 mls/hr Q1H IV  Last administered on 3/16/20at 

03:00;  Start 3/16/20 at 03:00;  Stop 3/16/20 at 03:59;  Status DC


Ondansetron HCl (Zofran) 4 mg 1X  ONCE IVP  Last administered on 3/16/20at 

03:27;  Start 3/16/20 at 03:00;  Stop 3/16/20 at 03:01;  Status DC


Morphine Sulfate (Morphine Sulfate) 4 mg 1X  ONCE IV ;  Start 3/16/20 at 03:00; 

Stop 3/16/20 at 03:01;  Status Cancel


Ketorolac Tromethamine (Toradol 30mg Vial) 30 mg 1X  ONCE IV  Last administered 

on 3/16/20at 02:54;  Start 3/16/20 at 03:00;  Stop 3/16/20 at 03:01;  Status DC


Fentanyl Citrate (Fentanyl 2ml Vial) 25 mcg 1X  ONCE IVP  Last administered on 

3/16/20at 03:23;  Start 3/16/20 at 03:30;  Stop 3/16/20 at 03:31;  Status DC


Fentanyl Citrate (Fentanyl 2ml Vial) 100 mcg STK-MED ONCE .ROUTE ;  Start 

3/16/20 at 03:18;  Stop 3/16/20 at 03:18;  Status DC


Iohexol (Omnipaque 350 Mg/ml) 90 ml 1X  ONCE IV  Last administered on 3/16/20at 

03:25;  Start 3/16/20 at 03:30;  Stop 3/16/20 at 03:31;  Status DC


Info (CONTRAST GIVEN -- Rx MONITORING) 1 each PRN DAILY  PRN MC SEE COMMENTS;  

Start 3/16/20 at 03:30;  Stop 3/18/20 at 03:29;  Status DC


Hydromorphone HCl (Dilaudid) 0.5 mg 1X  ONCE IV  Last administered on 3/16/20at 

03:55;  Start 3/16/20 at 04:30;  Stop 3/16/20 at 04:32;  Status DC


Ondansetron HCl (Zofran) 4 mg PRN Q8HRS  PRN IV NAUSEA/VOMITING 1ST CHOICE;  

Start 3/16/20 at 05:00;  Stop 3/16/20 at 09:27;  Status DC


Morphine Sulfate (Morphine Sulfate) 2 mg PRN Q2HR  PRN IV SEVERE PAIN 7-10 Last 

administered on 3/17/20at 12:26;  Start 3/16/20 at 05:00;  Stop 3/17/20 at 

14:15;  Status DC


Sodium Chloride 1,000 ml @  125 mls/hr Q8H IV  Last administered on 3/16/20at 

20:56;  Start 3/16/20 at 05:00;  Stop 3/17/20 at 04:59;  Status DC


Hydromorphone HCl (Dilaudid) 0.5 mg PRN Q3HRS  PRN IV SEVERE PAIN 7-10 Last 

administered on 3/17/20at 10:06;  Start 3/16/20 at 05:00;  Stop 3/17/20 at 

12:01;  Status DC


Piperacillin Sod/ Tazobactam Sod 4.5 gm/Sodium Chloride 100 ml @  200 mls/hr 1X 

ONCE IV  Last administered on 3/16/20at 05:44;  Start 3/16/20 at 06:00;  Stop 

3/16/20 at 06:29;  Status DC


Ondansetron HCl (Zofran) 4 mg PRN Q4HRS  PRN IV NAUSEA/VOMITING 1ST CHOICE Last 

administered on 6/27/20at 13:37;  Start 3/16/20 at 09:30


Insulin Human Lispro (HumaLOG) 0-9 UNITS Q6HRS SQ  Last administered on 7/6/20at

06:26;  Start 3/16/20 at 09:30


Dextrose (Dextrose 50%-Water Syringe) 12.5 gm PRN Q15MIN  PRN IV SEE COMMENTS;  

Start 3/16/20 at 09:30


Pantoprazole Sodium (PROTONIX VIAL for IV PUSH) 40 mg DAILYAC IVP  Last 

administered on 7/6/20at 08:39;  Start 3/16/20 at 11:30


Prochlorperazine Edisylate (Compazine) 10 mg PRN Q6HRS  PRN IV NAUSEA/VOMITING, 

2nd CHOICE Last administered on 6/27/20at 10:53;  Start 3/16/20 at 17:45


Atenolol (Tenormin) 100 mg DAILY PO ;  Start 3/17/20 at 09:00;  Stop 3/16/20 at 

20:08;  Status DC


Metoprolol Tartrate (Lopressor Vial) 2.5 mg Q6HRS IVP  Last administered on 

3/17/20at 05:51;  Start 3/16/20 at 20:15;  Stop 3/17/20 at 10:02;  Status DC


Metoprolol Tartrate (Lopressor Vial) 5 mg Q6HRS IVP  Last administered on 

3/26/20at 00:12;  Start 3/17/20 at 10:15;  Stop 3/28/20 at 08:48;  Status DC


Hydromorphone HCl (Dilaudid) 1 mg PRN Q3HRS  PRN IV SEVERE PAIN 7-10 Last 

administered on 3/23/20at 05:13;  Start 3/17/20 at 12:00;  Stop 3/31/20 at 

00:25;  Status DC


Lidocaine HCl (Buffered Lidocaine 1%) 3 ml STK-MED ONCE .ROUTE ;  Start 3/17/20 

at 12:55;  Stop 3/17/20 at 12:56;  Status DC


Albumin Human 500 ml @  125 mls/hr 1X  ONCE IV  Last administered on 3/17/20at 

14:33;  Start 3/17/20 at 14:30;  Stop 3/17/20 at 18:32;  Status DC


Norepinephrine Bitartrate 8 mg/ Dextrose 258 ml @  17.299 mls/ hr CONT  PRN IV 

PER PROTOCOL Last administered on 4/14/20at 12:48;  Start 3/17/20 at 15:30;  

Stop 4/17/20 at 09:19;  Status DC


Sodium Chloride 1,000 ml @  125 mls/hr Q8H IV  Last administered on 3/17/20at 

21:04;  Start 3/17/20 at 16:00;  Stop 3/18/20 at 02:42;  Status DC


Albumin Human 500 ml @  125 mls/hr PRN BID  PRN IV After every 2L NSS & BP < 

90mm Last administered on 6/30/20at 16:06;  Start 3/17/20 at 16:00;  Stop 7/3/20

at 09:30;  Status DC


Iohexol (Omnipaque 300 Mg/ml) 60 ml 1X  ONCE IV  Last administered on 3/17/20at 

17:20;  Start 3/17/20 at 17:00;  Stop 3/17/20 at 17:01;  Status DC


Info (CONTRAST GIVEN -- Rx MONITORING) 1 each PRN DAILY  PRN MC SEE COMMENTS;  

Start 3/17/20 at 17:00;  Stop 3/19/20 at 16:59;  Status DC


Meropenem 1 gm/ Sodium Chloride 100 ml @  200 mls/hr Q8HRS IV  Last administered

on 3/18/20at 05:45;  Start 3/17/20 at 20:00;  Stop 3/18/20 at 08:48;  Status DC


Furosemide (Lasix) 40 mg 1X  ONCE IVP  Last administered on 3/17/20at 22:12;  S

tart 3/17/20 at 22:30;  Stop 3/17/20 at 22:31;  Status DC


Calcium Chloride 1000 mg/Sodium Chloride 110 ml @  220 mls/hr 1X  ONCE IV  Last 

administered on 3/17/20at 22:11;  Start 3/17/20 at 22:30;  Stop 3/17/20 at 

22:59;  Status DC


Albuterol Sulfate (Ventolin Neb Soln) 2.5 mg 1X  ONCE NEB  Last administered on 

3/18/20at 00:56;  Start 3/17/20 at 22:30;  Stop 3/17/20 at 22:31;  Status DC


Insulin Human Regular (HumuLIN R VIAL) 5 unit 1X  ONCE IV  Last administered on 

3/17/20at 22:14;  Start 3/17/20 at 22:30;  Stop 3/17/20 at 22:31;  Status DC


Magnesium Sulfate 50 ml @ 25 mls/hr 1X  ONCE IV  Last administered on 3/18/20at 

02:57;  Start 3/18/20 at 03:00;  Stop 3/18/20 at 04:59;  Status DC


Calcium Gluconate 1000 mg/Sodium Chloride 110 ml @  220 mls/hr 1X  ONCE IV  Last

administered on 3/18/20at 02:46;  Start 3/18/20 at 03:00;  Stop 3/18/20 at 

03:29;  Status DC


Sodium Chloride 1,000 ml @  200 mls/hr Q5H IV  Last administered on 3/18/20at 

02:46;  Start 3/18/20 at 03:00;  Stop 3/18/20 at 10:21;  Status DC


Calcium Gluconate 1000 mg/Sodium Chloride 110 ml @  220 mls/hr 1X  ONCE IV  Last

administered on 3/18/20at 03:21;  Start 3/18/20 at 03:30;  Stop 3/18/20 at 

03:59;  Status DC


Sodium Bicarbonate 50 meq/Sodium Chloride 1,050 ml @  75 mls/hr Q14H IV  Last 

administered on 3/22/20at 21:10;  Start 3/18/20 at 07:30;  Stop 3/23/20 at 

10:28;  Status DC


Calcium Gluconate 2000 mg/Sodium Chloride 120 ml @  220 mls/hr 1X  ONCE IV  Last

administered on 3/18/20at 09:05;  Start 3/18/20 at 07:30;  Stop 3/18/20 at 

08:02;  Status DC


Lidocaine HCl (Xylocaine-Mpf 1% 2ml Vial) 2 ml STK-MED ONCE .ROUTE ;  Start 

3/18/20 at 08:47;  Stop 3/18/20 at 08:47;  Status DC


Meropenem 500 mg/ Sodium Chloride 50 ml @  100 mls/hr Q12HR IV  Last 

administered on 3/23/20at 21:01;  Start 3/18/20 at 18:00;  Stop 3/24/20 at 

07:58;  Status DC


Lidocaine HCl (Buffered Lidocaine 1%) 3 ml STK-MED ONCE .ROUTE ;  Start 3/18/20 

at 09:46;  Stop 3/18/20 at 09:46;  Status DC


Lidocaine HCl (Buffered Lidocaine 1%) 6 ml 1X  ONCE INJ  Last administered on 

3/18/20at 10:26;  Start 3/18/20 at 10:15;  Stop 3/18/20 at 10:16;  Status DC


Info (Tpn Per Pharmacy) 1 each PRN DAILY  PRN MC SEE COMMENTS Last administered 

on 7/5/20at 09:19;  Start 3/18/20 at 12:00


Sodium Chloride 1,000 ml @  1,000 mls/hr Q1H PRN IV hypotension;  Start 3/18/20 

at 12:07;  Stop 3/18/20 at 18:06;  Status DC


Diphenhydramine HCl (Benadryl) 25 mg 1X PRN  PRN IV ITCHING;  Start 3/18/20 at 

12:15;  Stop 3/19/20 at 12:14;  Status DC


Diphenhydramine HCl (Benadryl) 25 mg 1X PRN  PRN IV ITCHING;  Start 3/18/20 at 

12:15;  Stop 3/19/20 at 12:14;  Status DC


Sodium Chloride 1,000 ml @  400 mls/hr Q2H30M PRN IV PATENCY;  Start 3/18/20 at 

12:07;  Stop 3/19/20 at 00:06;  Status DC


Info (PHARMACY MONITORING -- do not chart) 1 each PRN DAILY  PRN MC SEE 

COMMENTS;  Start 3/18/20 at 12:15;  Stop 3/20/20 at 08:13;  Status DC


Sodium Chloride 90 meq/Calcium Gluconate 10 meq/ Multivitamins 10 ml/Chromium/ 

Copper/Manganese/ Seleni/Zn 1 ml/ Total Parenteral Nutrition/Amino 

Acids/Dextrose/ Fat Emulsion Intravenous 55.005 ml  @ 2.292 mls/hr TPN  CONT IV 

;  Start 3/18/20 at 22:00;  Stop 3/18/20 at 12:33;  Status DC


Info (Tpn Per Pharmacy) 1 each PRN DAILY  PRN MC SEE COMMENTS;  Start 3/18/20 at

12:30;  Status UNV


Sodium Chloride 90 meq/Calcium Gluconate 10 meq/ Multivitamins 10 ml/Chromium/ 

Copper/Manganese/ Seleni/Zn 0.5 ml/ Total Parenteral Nutrition/Amino 

Acids/Dextrose/ Fat Emulsion Intravenous 1,512 ml @  63 mls/hr TPN  CONT IV  

Last administered on 3/18/20at 22:06;  Start 3/18/20 at 22:00;  Stop 3/19/20 at 

21:59;  Status DC


Calcium Carbonate/ Glycine (Tums) 500 mg PRN AFTMEALHC  PRN PO INDIGESTION;  

Start 3/18/20 at 17:45;  Stop 5/13/20 at 10:25;  Status DC


Calcium Gluconate (Calcium Gluconate) 2,000 mg 1X  ONCE IVP  Last administered 

on 3/19/20at 02:19;  Start 3/19/20 at 02:15;  Stop 3/19/20 at 02:16;  Status DC


Calcium Chloride 3000 mg/Sodium Chloride 1,030 ml @  50 mls/hr [id number] IV  Last 

administered on 3/21/20at 02:17;  Start 3/19/20 at 08:00;  Stop 3/21/20 at 

15:23;  Status DC


Lorazepam (Ativan Inj) 1 mg PRN Q4HRS  PRN IVP ANXIETY / AGITATION, 2nd choic 

Last administered on 4/17/20at 03:51;  Start 3/19/20 at 09:00;  Stop 4/17/20 at 

09:19;  Status DC


Sodium Chloride 1,000 ml @  1,000 mls/hr Q1H PRN IV hypotension;  Start 3/19/20 

at 08:56;  Stop 3/19/20 at 14:55;  Status DC


Albumin Human 200 ml @  200 mls/hr 1X PRN  PRN IV Hypotension;  Start 3/19/20 at

09:00;  Stop 3/19/20 at 14:59;  Status DC


Diphenhydramine HCl (Benadryl) 25 mg 1X PRN  PRN IV ITCHING;  Start 3/19/20 at 

09:00;  Stop 3/20/20 at 08:59;  Status DC


Diphenhydramine HCl (Benadryl) 25 mg 1X PRN  PRN IV ITCHING;  Start 3/19/20 at 

09:00;  Stop 3/20/20 at 08:59;  Status DC


Sodium Chloride 1,000 ml @  400 mls/hr Q2H30M PRN IV PATENCY;  Start 3/19/20 at 

08:56;  Stop 3/19/20 at 20:55;  Status DC


Info (PHARMACY MONITORING -- do not chart) 1 each PRN DAILY  PRN MC SEE 

COMMENTS;  Start 3/19/20 at 09:00;  Status UNV


Info (PHARMACY MONITORING -- do not chart) 1 each PRN DAILY  PRN MC SEE 

COMMENTS;  Start 3/19/20 at 09:00;  Stop 3/20/20 at 08:13;  Status DC


Digoxin (Lanoxin) 500 mcg 1X  ONCE IV  Last administered on 3/19/20at 10:04;  

Start 3/19/20 at 10:00;  Stop 3/19/20 at 10:01;  Status DC


Digoxin (Lanoxin) 125 mcg 1X  ONCE IV  Last administered on 3/19/20at 17:10;  

Start 3/19/20 at 18:00;  Stop 3/19/20 at 18:01;  Status DC


Magnesium Sulfate 100 ml @  25 mls/hr 1X  ONCE IV  Last administered on 

3/19/20at 12:48;  Start 3/19/20 at 13:00;  Stop 3/19/20 at 16:59;  Status DC


Sodium Chloride 90 meq/Magnesium Sulfate 10 meq/ Calcium Gluconate 20 meq/ 

Multivitamins 10 ml/Chromium/ Copper/Manganese/ Seleni/Zn 0.5 ml/ Total 

Parenteral Nutrition/Amino Acids/Dextrose/ Fat Emulsion Intravenous 1,512 ml @  

63 mls/hr TPN  CONT IV  Last administered on 3/19/20at 22:25;  Start 3/19/20 at 

22:00;  Stop 3/20/20 at 21:59;  Status DC


Sodium Chloride 1,000 ml @  1,000 mls/hr Q1H PRN IV hypotension;  Start 3/20/20 

at 08:05;  Stop 3/20/20 at 14:04;  Status DC


Albumin Human 200 ml @  200 mls/hr 1X  ONCE IV  Last administered on 3/20/20at 

08:57;  Start 3/20/20 at 08:15;  Stop 3/20/20 at 09:14;  Status DC


Diphenhydramine HCl (Benadryl) 25 mg 1X PRN  PRN IV ITCHING;  Start 3/20/20 at 

08:15;  Stop 3/21/20 at 08:14;  Status DC


Diphenhydramine HCl (Benadryl) 25 mg 1X PRN  PRN IV ITCHING;  Start 3/20/20 at 

08:15;  Stop 3/21/20 at 08:14;  Status DC


Sodium Chloride 1,000 ml @  400 mls/hr Q2H30M PRN IV PATENCY;  Start 3/20/20 at 

08:05;  Stop 3/20/20 at 20:04;  Status DC


Info (PHARMACY MONITORING -- do not chart) 1 each PRN DAILY  PRN MC SEE 

COMMENTS;  Start 3/20/20 at 08:15;  Stop 3/24/20 at 07:57;  Status DC


Sodium Chloride 90 meq/Potassium Chloride 15 meq/ Potassium Phosphate 10 mmol/ 

Magnesium Sulfate 10 meq/Calcium Gluconate 20 meq/ Multivitamins 10 ml/Chromium/

Copper/Manganese/ Seleni/Zn 0.5 ml/ Total Parenteral Nutrition/Amino 

Acids/Dextrose/ Fat Emulsion Intravenous 1,512 ml @  63 mls/hr TPN  CONT IV  

Last administered on 3/20/20at 21:01;  Start 3/20/20 at 22:00;  Stop 3/21/20 at 

21:59;  Status DC


Potassium Chloride/Water 100 ml @  100 mls/hr 1X  ONCE IV  Last administered on 

3/20/20at 14:09;  Start 3/20/20 at 14:00;  Stop 3/20/20 at 14:59;  Status DC


Benzocaine (Hurricaine One) 1 spray 1X  ONCE MM  Last administered on 3/20/20at 

16:38;  Start 3/20/20 at 14:30;  Stop 3/20/20 at 14:31;  Status DC


Lidocaine HCl (Glydo (Lidocaine) Jelly) 1 thomas 1X  ONCE MM  Last administered on 

3/20/20at 16:38;  Start 3/20/20 at 14:30;  Stop 3/20/20 at 14:31;  Status DC


Linezolid/Dextrose 300 ml @  300 mls/hr Q12HR IV  Last administered on 3/26/20at

21:04;  Start 3/20/20 at 20:00;  Stop 3/27/20 at 07:50;  Status DC


Acetaminophen (Tylenol) 650 mg PRN Q6HRS  PRN PO MILD PAIN / TEMP;  Start 

3/21/20 at 03:30;  Stop 3/21/20 at 03:36;  Status DC


Acetaminophen (Tylenol) 650 mg PRN Q6HRS  PRN PEG MILD PAIN / TEMP Last 

administered on 4/16/20at 19:56;  Start 3/21/20 at 03:36;  Stop 5/13/20 at 

10:25;  Status DC


Sodium Chloride 1,000 ml @  1,000 mls/hr Q1H PRN IV hypotension;  Start 3/21/20 

at 07:50;  Stop 3/21/20 at 13:49;  Status DC


Albumin Human 200 ml @  200 mls/hr 1X PRN  PRN IV Hypotension;  Start 3/21/20 at

08:00;  Stop 3/21/20 at 13:59;  Status DC


Sodium Chloride (Normal Saline Flush) 10 ml 1X PRN  PRN IV AP catheter pack;  

Start 3/21/20 at 08:00;  Stop 3/22/20 at 07:59;  Status DC


Sodium Chloride (Normal Saline Flush) 10 ml 1X PRN  PRN IV  catheter pack;  

Start 3/21/20 at 08:00;  Stop 3/22/20 at 07:59;  Status DC


Sodium Chloride 1,000 ml @  400 mls/hr Q2H30M PRN IV PATENCY;  Start 3/21/20 at 

07:50;  Stop 3/21/20 at 19:49;  Status DC


Info (PHARMACY MONITORING -- do not chart) 1 each PRN DAILY  PRN MC SEE 

COMMENTS;  Start 3/21/20 at 08:00;  Status UNV


Info (PHARMACY MONITORING -- do not chart) 1 each PRN DAILY  PRN MC SEE 

COMMENTS;  Start 3/21/20 at 08:00;  Stop 3/23/20 at 08:25;  Status DC


Sodium Chloride 90 meq/Potassium Chloride 15 meq/ Potassium Phosphate 10 mmol/ 

Magnesium Sulfate 10 meq/Calcium Gluconate 20 meq/ Multivitamins 10 ml/Chromium/

Copper/Manganese/ Seleni/Zn 0.5 ml/ Total Parenteral Nutrition/Amino 

Acids/Dextrose/ Fat Emulsion Intravenous 1,512 ml @  63 mls/hr TPN  CONT IV  

Last administered on 3/21/20at 20:57;  Start 3/21/20 at 22:00;  Stop 3/22/20 at 

21:59;  Status DC


Sodium Chloride 90 meq/Potassium Chloride 15 meq/ Potassium Phosphate 15 mmol/ 

Magnesium Sulfate 10 meq/Calcium Gluconate 20 meq/ Multivitamins 10 ml/Chromium/

Copper/Manganese/ Seleni/Zn 0.5 ml/ Total Parenteral Nutrition/Amino 

Acids/Dextrose/ Fat Emulsion Intravenous 1,512 ml @  63 mls/hr TPN  CONT IV ;  

Start 3/22/20 at 22:00;  Stop 3/22/20 at 14:16;  Status DC


Sodium Chloride 90 meq/Potassium Chloride 15 meq/ Potassium Phosphate 15 mmol/ 

Magnesium Sulfate 10 meq/Calcium Gluconate 20 meq/ Multivitamins 10 ml/Chromium/

Copper/Manganese/ Seleni/Zn 0.5 ml/ Total Parenteral Nutrition/Amino 

Acids/Dextrose/ Fat Emulsion Intravenous 1,200 ml @  50 mls/hr TPN  CONT IV ;  

Start 3/22/20 at 22:00;  Stop 3/22/20 at 14:17;  Status DC


Sodium Chloride 90 meq/Potassium Chloride 15 meq/ Potassium Phosphate 10 mmol/ 

Magnesium Sulfate 10 meq/Calcium Gluconate 20 meq/ Multivitamins 10 ml/Chromium/

Copper/Manganese/ Seleni/Zn 0.5 ml/ Total Parenteral Nutrition/Amino 

Acids/Dextrose/ Fat Emulsion Intravenous 1,200 ml @  50 mls/hr TPN  CONT IV  

Last administered on 3/22/20at 23:29;  Start 3/22/20 at 22:00;  Stop 3/23/20 at 

21:59;  Status DC


Sodium Chloride 1,000 ml @  1,000 mls/hr Q1H PRN IV hypotension;  Start 3/23/20 

at 07:28;  Stop 3/23/20 at 13:27;  Status DC


Albumin Human 200 ml @  200 mls/hr 1X  ONCE IV  Last administered on 3/23/20at 

08:51;  Start 3/23/20 at 07:30;  Stop 3/23/20 at 08:29;  Status DC


Diphenhydramine HCl (Benadryl) 25 mg 1X PRN  PRN IV ITCHING;  Start 3/23/20 at 

07:30;  Stop 3/24/20 at 07:29;  Status DC


Diphenhydramine HCl (Benadryl) 25 mg 1X PRN  PRN IV ITCHING;  Start 3/23/20 at 

07:30;  Stop 3/24/20 at 07:29;  Status DC


Sodium Chloride 1,000 ml @  400 mls/hr Q2H30M PRN IV PATENCY;  Start 3/23/20 at 

07:28;  Stop 3/23/20 at 19:27;  Status DC


Info (PHARMACY MONITORING -- do not chart) 1 each PRN DAILY  PRN MC SEE 

COMMENTS;  Start 3/23/20 at 07:30;  Stop 4/3/20 at 13:01;  Status DC


Metronidazole 100 ml @  100 mls/hr Q6HRS IV  Last administered on 4/8/20at 

06:26;  Start 3/23/20 at 08:30;  Stop 4/8/20 at 09:58;  Status DC


Micafungin Sodium 100 mg/Dextrose 100 ml @  100 mls/hr Q24H IV  Last 

administered on 4/30/20at 08:18;  Start 3/23/20 at 09:00;  Stop 4/30/20 at 

20:58;  Status DC


Propofol 0 ml @ As Directed STK-MED ONCE IV ;  Start 3/23/20 at 07:53;  Stop 

3/23/20 at 07:53;  Status DC


Etomidate (Amidate) 20 mg STK-MED ONCE IV ;  Start 3/23/20 at 07:53;  Stop 3/23

/20 at 07:54;  Status DC


Midazolam HCl (Versed) 5 mg STK-MED ONCE .ROUTE ;  Start 3/23/20 at 07:57;  Stop

3/23/20 at 07:57;  Status DC


Fentanyl Citrate 30 ml @ 0 mls/hr CONT  PRN IV SEE PROTOCOL Last administered on

4/17/20at 06:12;  Start 3/23/20 at 08:15;  Stop 4/17/20 at 09:19;  Status DC


Artificial Tears (Artificial Tears) 1 drop PRN Q1HR  PRN OU DRY EYE, 1st choice;

 Start 3/23/20 at 08:15;  Stop 4/29/20 at 05:31;  Status DC


Midazolam HCl 50 mg/Sodium Chloride 50 ml @ 0 mls/hr CONT  PRN IV SEE PROTOCOL 

Last administered on 3/26/20at 22:39;  Start 3/23/20 at 08:15;  Stop 3/28/20 at 

15:59;  Status DC


Etomidate (Amidate) 8 mg 1X  ONCE IV  Last administered on 3/23/20at 08:33;  

Start 3/23/20 at 08:30;  Stop 3/23/20 at 08:31;  Status DC


Succinylcholine Chloride (Anectine) 120 mg 1X  ONCE IV  Last administered on 

3/23/20at 08:34;  Start 3/23/20 at 08:30;  Stop 3/23/20 at 08:31;  Status DC


Midazolam HCl (Versed) 5 mg 1X  ONCE IV ;  Start 3/23/20 at 08:30;  Stop 3/23/20

at 08:31;  Status DC


Potassium Chloride 15 meq/ Bicarbonate Dialysis Soln w/ out KCl 5,007.5 ml  @ 

1,000 mls/ hr Q5H1M IV  Last administered on 3/24/20at 11:11;  Start 3/23/20 at 

12:00;  Stop 3/24/20 at 11:15;  Status DC


Potassium Chloride 15 meq/ Bicarbonate Dialysis Soln w/ out KCl 5,007.5 ml  @ 

1,000 mls/ hr Q5H1M IV  Last administered on 3/24/20at 11:12;  Start 3/23/20 at 

12:00;  Stop 3/24/20 at 11:17;  Status DC


Potassium Chloride 15 meq/ Bicarbonate Dialysis Soln w/ out KCl 5,007.5 ml  @ 

1,000 mls/ hr Q5H1M IV  Last administered on 3/24/20at 11:11;  Start 3/23/20 at 

12:00;  Stop 3/24/20 at 11:19;  Status DC


Sodium Chloride 90 meq/Potassium Chloride 15 meq/ Potassium Phosphate 10 mmol/ 

Magnesium Sulfate 10 meq/Calcium Gluconate 20 meq/ Multivitamins 10 ml/Chromium/

Copper/Manganese/ Seleni/Zn 0.5 ml/ Total Parenteral Nutrition/Amino 

Acids/Dextrose/ Fat Emulsion Intravenous 1,400 ml @  58.333 mls/ hr TPN  CONT IV

 Last administered on 3/23/20at 21:42;  Start 3/23/20 at 22:00;  Stop 3/24/20 at

21:59;  Status DC


Heparin Sodium (Porcine) (Heparin Sodium) 5,000 unit Q8HRS SQ  Last administered

on 3/28/20at 05:55;  Start 3/23/20 at 15:00;  Stop 3/28/20 at 13:28;  Status DC


Meropenem 500 mg/ Sodium Chloride 50 ml @  100 mls/hr Q6HRS IV  Last 

administered on 3/25/20at 06:00;  Start 3/24/20 at 09:00;  Stop 3/25/20 at 

07:29;  Status DC


Potassium Phosphate 20 mmol/ Sodium Chloride 106.6667 ml @  51.667 m... 1X  ONCE

IV  Last administered on 3/24/20at 11:22;  Start 3/24/20 at 10:15;  Stop 3/24/20

at 12:18;  Status DC


Acetaminophen (Tylenol Supp) 650 mg PRN Q6HRS  PRN MS MILD PAIN / TEMP > 100.3'F

Last administered on 6/29/20at 18:16;  Start 3/24/20 at 10:30


Potassium Chloride/Water 100 ml @  100 mls/hr Q1H IV  Last administered on 

3/24/20at 12:12;  Start 3/24/20 at 11:00;  Stop 3/24/20 at 12:59;  Status DC


Potassium Chloride 20 meq/ Bicarbonate Dialysis Soln w/ out KCl 5,010 ml @  

1,000 mls/hr Q5H1M IV  Last administered on 3/25/20at 08:48;  Start 3/24/20 at 

12:00;  Stop 3/25/20 at 13:03;  Status DC


Potassium Chloride 20 meq/ Bicarbonate Dialysis Soln w/ out KCl 5,010 ml @  

1,000 mls/hr Q5H1M IV  Last administered on 3/29/20at 14:52;  Start 3/24/20 at 

11:30;  Stop 3/29/20 at 19:59;  Status DC


Potassium Chloride 20 meq/ Bicarbonate Dialysis Soln w/ out KCl 5,010 ml @  

1,000 mls/hr Q5H1M IV  Last administered on 3/29/20at 14:53;  Start 3/24/20 at 

11:30;  Stop 3/29/20 at 19:59;  Status DC


Sodium Chloride 90 meq/Potassium Chloride 15 meq/ Potassium Phosphate 15 mmol/ 

Magnesium Sulfate 10 meq/Calcium Gluconate 15 meq/ Multivitamins 10 ml/Chromium/

Copper/Manganese/ Seleni/Zn 0.5 ml/ Total Parenteral Nutrition/Amino 

Acids/Dextrose/ Fat Emulsion Intravenous 1,400 ml @  58.333 mls/ hr TPN  CONT IV

 Last administered on 3/24/20at 22:17;  Start 3/24/20 at 22:00;  Stop 3/25/20 at

21:59;  Status DC


Cefepime HCl (Maxipime) 2 gm Q12HR IVP  Last administered on 4/7/20at 20:56;  

Start 3/25/20 at 09:00;  Stop 4/8/20 at 09:58;  Status DC


Daptomycin 500 mg/ Sodium Chloride 50 ml @  100 mls/hr Q48H IV  Last 

administered on 4/10/20at 09:57;  Start 3/25/20 at 08:30;  Stop 4/10/20 at 10:

07;  Status DC


Lidocaine HCl (Buffered Lidocaine 1%) 3 ml 1X  ONCE INJ  Last administered on 

3/25/20at 10:27;  Start 3/25/20 at 10:30;  Stop 3/25/20 at 10:31;  Status DC


Potassium Phosphate 20 mmol/ Sodium Chloride 106.6667 ml @  51.667 m... 1X  ONCE

IV  Last administered on 3/25/20at 12:51;  Start 3/25/20 at 13:00;  Stop 3/25/20

at 15:03;  Status DC


Sodium Chloride 90 meq/Potassium Chloride 15 meq/ Potassium Phosphate 18 mmol/ 

Magnesium Sulfate 8 meq/Calcium Gluconate 15 meq/ Multivitamins 10 ml/Chromium/ 

Copper/Manganese/ Seleni/Zn 0.5 ml/ Total Parenteral Nutrition/Amino 

Acids/Dextrose/ Fat Emulsion Intravenous 1,400 ml @  58.333 mls/ hr TPN  CONT IV

 Last administered on 3/25/20at 22:16;  Start 3/25/20 at 22:00;  Stop 3/26/20 at

21:59;  Status DC


Potassium Chloride 20 meq/ Bicarbonate Dialysis Soln w/ out KCl 5,010 ml @  

1,000 mls/hr Q5H1M IV  Last administered on 3/29/20at 14:54;  Start 3/25/20 at 

16:00;  Stop 3/29/20 at 19:59;  Status DC


Multi-Ingred Cream/Lotion/Oil/ Oint (Artificial Tears Eye Ointment) 1 thomas PRN 

Q1HR  PRN OU DRY EYE, 2nd choice Last administered on 4/13/20at 08:19;  Start 

3/25/20 at 17:30;  Stop 6/3/20 at 14:39;  Status DC


Sodium Chloride 90 meq/Potassium Chloride 15 meq/ Potassium Phosphate 18 mmol/ 

Magnesium Sulfate 8 meq/Calcium Gluconate 15 meq/ Multivitamins 10 ml/Chromium/ 

Copper/Manganese/ Seleni/Zn 0.5 ml/ Total Parenteral Nutrition/Amino 

Acids/Dextrose/ Fat Emulsion Intravenous 1,400 ml @  58.333 mls/ hr TPN  CONT IV

 Last administered on 3/26/20at 22:00;  Start 3/26/20 at 22:00;  Stop 3/27/20 at

21:59;  Status DC


Albumin Human 500 ml @  125 mls/hr 1X  ONCE IV ;  Start 3/26/20 at 14:15;  Stop 

3/26/20 at 18:14;  Status DC


Sodium Chloride 90 meq/Potassium Chloride 15 meq/ Potassium Phosphate 18 mmol/ 

Magnesium Sulfate 8 meq/Calcium Gluconate 15 meq/ Multivitamins 10 ml/Chromium/ 

Copper/Manganese/ Seleni/Zn 0.5 ml/ Insulin Human Regular 10 unit/ Total Paren

teral Nutrition/Amino Acids/Dextrose/ Fat Emulsion Intravenous 1,400 ml @  

58.333 mls/ hr TPN  CONT IV  Last administered on 3/27/20at 21:43;  Start 

3/27/20 at 22:00;  Stop 3/28/20 at 21:59;  Status DC


Lidocaine HCl (Buffered Lidocaine 1%) 3 ml STK-MED ONCE .ROUTE ;  Start 3/25/20 

at 10:00;  Stop 3/27/20 at 13:57;  Status DC


Midazolam HCl 100 mg/Sodium Chloride 100 ml @ 7 mls/hr CONT  PRN IV SEE PROTOCOL

Last administered on 4/8/20at 15:35;  Start 3/28/20 at 16:00;  Stop 6/3/20 at 

14:38;  Status DC


Sodium Chloride 90 meq/Potassium Chloride 15 meq/ Potassium Phosphate 18 mmol/ 

Magnesium Sulfate 8 meq/Calcium Gluconate 15 meq/ Multivitamins 10 ml/Chromium/ 

Copper/Manganese/ Seleni/Zn 0.5 ml/ Insulin Human Regular 15 unit/ Total 

Parenteral Nutrition/Amino Acids/Dextrose/ Fat Emulsion Intravenous 1,400 ml @  

58.333 mls/ hr TPN  CONT IV  Last administered on 3/28/20at 20:34;  Start 3/28/2

0 at 22:00;  Stop 3/29/20 at 21:59;  Status DC


Info ([facility] Electrolyte Protocol) 1 ea CONT PRN  PRN MC PER PROTOCOL;  Start 

3/29/20 at 13:15


Sodium Chloride 90 meq/Potassium Chloride 15 meq/ Potassium Phosphate 18 mmol/ 

Magnesium Sulfate 8 meq/Calcium Gluconate 15 meq/ Multivitamins 10 ml/Chromium/ 

Copper/Manganese/ Seleni/Zn 0.5 ml/ Insulin Human Regular 15 unit/ Total 

Parenteral Nutrition/Amino Acids/Dextrose/ Fat Emulsion Intravenous 1,400 ml @  

58.333 mls/ hr TPN  CONT IV  Last administered on 3/29/20at 22:05;  Start 

3/29/20 at 22:00;  Stop 3/30/20 at 21:59;  Status DC


Potassium Chloride 15 meq/ Bicarbonate Dialysis Soln w/ out KCl 5,007.5 ml  @ 

1,000 mls/ hr Q5H1M IV  Last administered on 4/1/20at 18:14;  Start 3/29/20 at 

20:00;  Stop 4/2/20 at 13:08;  Status DC


Potassium Chloride 15 meq/ Bicarbonate Dialysis Soln w/ out KCl 5,007.5 ml  @ 

1,000 mls/ hr Q5H1M IV  Last administered on 4/1/20at 18:14;  Start 3/29/20 at 

20:00;  Stop 4/2/20 at 13:08;  Status DC


Potassium Chloride 15 meq/ Bicarbonate Dialysis Soln w/ out KCl 5,007.5 ml  @ 

1,000 mls/ hr Q5H1M IV  Last administered on 4/1/20at 18:14;  Start 3/29/20 at 

20:00;  Stop 4/2/20 at 13:08;  Status DC


Iohexol (Omnipaque 240 Mg/ml) 30 ml 1X  ONCE PO  Last administered on 3/30/20at 

11:30;  Start 3/30/20 at 11:30;  Stop 3/30/20 at 11:33;  Status DC


Info (CONTRAST GIVEN -- Rx MONITORING) 1 each PRN DAILY  PRN MC SEE COMMENTS;  

Start 3/30/20 at 11:45;  Stop 4/1/20 at 11:44;  Status DC


Sodium Chloride 90 meq/Potassium Chloride 15 meq/ Potassium Phosphate 18 mmol/ M

agnesium Sulfate 8 meq/Calcium Gluconate 15 meq/ Multivitamins 10 ml/Chromium/ 

Copper/Manganese/ Seleni/Zn 0.5 ml/ Insulin Human Regular 15 unit/ Total 

Parenteral Nutrition/Amino Acids/Dextrose/ Fat Emulsion Intravenous 1,400 ml @  

58.333 mls/ hr TPN  CONT IV  Last administered on 3/30/20at 21:47;  Start 

3/30/20 at 22:00;  Stop 3/31/20 at 21:59;  Status DC


Sodium Chloride 90 meq/Potassium Chloride 15 meq/ Potassium Phosphate 18 mmol/ 

Magnesium Sulfate 8 meq/Calcium Gluconate 15 meq/ Multivitamins 10 ml/Chromium/ 

Copper/Manganese/ Seleni/Zn 0.5 ml/ Insulin Human Regular 20 unit/ Total 

Parenteral Nutrition/Amino Acids/Dextrose/ Fat Emulsion Intravenous 1,400 ml @  

58.333 mls/ hr TPN  CONT IV  Last administered on 3/31/20at 21:36;  Start 

3/31/20 at 22:00;  Stop 4/1/20 at 21:59;  Status DC


Alteplase, Recombinant (Cathflo For Central Catheter Clearance) 1 mg 1X  ONCE 

INT CAT  Last administered on 3/31/20at 20:03;  Start 3/31/20 at 19:30;  Stop 

3/31/20 at 19:46;  Status DC


Alteplase, Recombinant (Cathflo For Central Catheter Clearance) 1 mg 1X  ONCE 

INT CAT  Last administered on 3/31/20at 22:05;  Start 3/31/20 at 22:00;  Stop 

3/31/20 at 22:01;  Status DC


Sodium Chloride 90 meq/Potassium Chloride 15 meq/ Potassium Phosphate 18 mmol/ 

Magnesium Sulfate 8 meq/Calcium Gluconate 15 meq/ Multivitamins 10 ml/Chromium/ 

Copper/Manganese/ Seleni/Zn 0.5 ml/ Insulin Human Regular 20 unit/ Total 

Parenteral Nutrition/Amino Acids/Dextrose/ Fat Emulsion Intravenous 1,400 ml @  

58.333 mls/ hr TPN  CONT IV  Last administered on 4/1/20at 21:30;  Start 4/1/20 

at 22:00;  Stop 4/2/20 at 21:59;  Status DC


Dexmedetomidine HCl 400 mcg/ Sodium Chloride 100 ml @ 0 mls/hr CONT  PRN IV 

ANXIETY / AGITATION Last administered on 5/30/20at 12:57;  Start 4/2/20 at 

08:15;  Stop 5/30/20 at 18:31;  Status DC


Sodium Chloride 500 ml @  500 mls/hr 1X PRN  PRN IV ELEVATED BP, SEE COMMENTS;  

Start 4/2/20 at 08:15


Atropine Sulfate (ATROPINE 0.5mg SYRINGE) 0.5 mg PRN Q5MIN  PRN IV SEE COMMENTS;

 Start 4/2/20 at 08:15


Furosemide (Lasix) 20 mg 1X  ONCE IVP  Last administered on 4/2/20at 08:19;  

Start 4/2/20 at 08:15;  Stop 4/2/20 at 08:16;  Status DC


Lidocaine HCl (Buffered Lidocaine 1%) 3 ml STK-MED ONCE .ROUTE ;  Start 4/2/20 

at 08:39;  Stop 4/2/20 at 08:39;  Status DC


Lidocaine HCl (Buffered Lidocaine 1%) 6 ml 1X  ONCE INJ  Last administered on 

4/2/20at 09:05;  Start 4/2/20 at 09:00;  Stop 4/2/20 at 09:06;  Status DC


Sodium Chloride 90 meq/Potassium Chloride 15 meq/ Potassium Phosphate 18 mmol/ 

Magnesium Sulfate 8 meq/Calcium Gluconate 15 meq/ Multivitamins 10 ml/Chromium/ 

Copper/Manganese/ Seleni/Zn 0.5 ml/ Insulin Human Regular 20 unit/ Total 

Parenteral Nutrition/Amino Acids/Dextrose/ Fat Emulsion Intravenous 1,400 ml @  

58.333 mls/ hr TPN  CONT IV  Last administered on 4/2/20at 22:45;  Start 4/2/20 

at 22:00;  Stop 4/3/20 at 21:59;  Status DC


Sodium Chloride 1,000 ml @  1,000 mls/hr Q1H PRN IV hypotension;  Start 4/3/20 

at 07:30;  Stop 4/3/20 at 13:29;  Status DC


Albumin Human 200 ml @  200 mls/hr 1X PRN  PRN IV Hypotension Last administered 

on 4/3/20at 09:36;  Start 4/3/20 at 07:30;  Stop 4/3/20 at 13:29;  Status DC


Sodium Chloride (Normal Saline Flush) 10 ml 1X PRN  PRN IV AP catheter pack;  

Start 4/3/20 at 07:30;  Stop 4/3/20 at 21:29;  Status DC


Sodium Chloride (Normal Saline Flush) 10 ml 1X PRN  PRN IV  catheter pack;  

Start 4/3/20 at 07:30;  Stop 4/4/20 at 07:29;  Status DC


Sodium Chloride 1,000 ml @  400 mls/hr Q2H30M PRN IV PATENCY;  Start 4/3/20 at 

07:30;  Stop 4/3/20 at 19:29;  Status DC


Info (PHARMACY MONITORING -- do not chart) 1 each PRN DAILY  PRN MC SEE 

COMMENTS;  Start 4/3/20 at 07:30;  Stop 4/3/20 at 13:02;  Status DC


Info (PHARMACY MONITORING -- do not chart) 1 each PRN DAILY  PRN MC SEE 

COMMENTS;  Start 4/3/20 at 07:30;  Stop 4/5/20 at 12:45;  Status DC


Sodium Chloride 90 meq/Potassium Chloride 15 meq/ Potassium Phosphate 10 mmol/ 

Magnesium Sulfate 8 meq/Calcium Gluconate 15 meq/ Multivitamins 10 ml/Chromium/ 

Copper/Manganese/ Seleni/Zn 0.5 ml/ Insulin Human Regular 25 unit/ Total 

Parenteral Nutrition/Amino Acids/Dextrose/ Fat Emulsion Intravenous 1,400 ml @  

58.333 mls/ hr TPN  CONT IV  Last administered on 4/3/20at 22:19;  Start 4/3/20 

at 22:00;  Stop 4/4/20 at 21:59;  Status DC


Heparin Sodium (Porcine) (Heparin Sodium) 5,000 unit Q12HR SQ  Last administered

on 4/26/20at 08:59;  Start 4/3/20 at 21:00;  Stop 4/26/20 at 10:05;  Status DC


Ondansetron HCl (Zofran) 4 mg PRN Q6HRS  PRN IV NAUSEA/VOMITING;  Start 4/6/20 

at 07:00;  Stop 4/7/20 at 06:59;  Status DC


Fentanyl Citrate (Fentanyl 2ml Vial) 25 mcg PRN Q5MIN  PRN IV MILD PAIN 1-3;  

Start 4/6/20 at 07:00;  Stop 4/7/20 at 06:59;  Status DC


Fentanyl Citrate (Fentanyl 2ml Vial) 50 mcg PRN Q5MIN  PRN IV MODERATE TO SEVERE

PAIN;  Start 4/6/20 at 07:00;  Stop 4/7/20 at 06:59;  Status DC


Ringer's Solution 1,000 ml @  30 mls/hr Q24H IV ;  Start 4/6/20 at 07:00;  Stop 

4/6/20 at 18:59;  Status DC


Lidocaine HCl (Xylocaine-Mpf 1% 2ml Vial) 2 ml PRN 1X  PRN ID PRIOR TO IV START;

 Start 4/6/20 at 07:00;  Stop 4/7/20 at 06:59;  Status DC


Prochlorperazine Edisylate (Compazine) 5 mg PACU PRN  PRN IV NAUSEA, MRX1;  S

tart 4/6/20 at 07:00;  Stop 4/7/20 at 06:59;  Status DC


Sodium Chloride 1,000 ml @  1,000 mls/hr Q1H PRN IV hypotension;  Start 4/4/20 

at 09:10;  Stop 4/4/20 at 15:09;  Status DC


Albumin Human 200 ml @  200 mls/hr 1X PRN  PRN IV Hypotension Last administered 

on 4/4/20at 10:10;  Start 4/4/20 at 09:15;  Stop 4/4/20 at 15:14;  Status DC


Sodium Chloride 1,000 ml @  400 mls/hr Q2H30M PRN IV PATENCY;  Start 4/4/20 at 

09:10;  Stop 4/4/20 at 21:09;  Status DC


Info (PHARMACY MONITORING -- do not chart) 1 each PRN DAILY  PRN MC SEE 

COMMENTS;  Start 4/4/20 at 09:15;  Stop 4/5/20 at 12:45;  Status DC


Info (PHARMACY MONITORING -- do not chart) 1 each PRN DAILY  PRN MC SEE 

COMMENTS;  Start 4/4/20 at 09:15;  Stop 4/5/20 at 12:45;  Status DC


Sodium Chloride 90 meq/Potassium Chloride 15 meq/ Potassium Phosphate 10 mmol/ 

Magnesium Sulfate 8 meq/Calcium Gluconate 15 meq/ Multivitamins 10 ml/Chromium/ 

Copper/Manganese/ Seleni/Zn 0.5 ml/ Insulin Human Regular 25 unit/ Total 

Parenteral Nutrition/Amino Acids/Dextrose/ Fat Emulsion Intravenous 1,400 ml @  

58.333 mls/ hr TPN  CONT IV  Last administered on 4/4/20at 22:10;  Start 4/4/20 

at 22:00;  Stop 4/5/20 at 21:59;  Status DC


Magnesium Sulfate 50 ml @ 25 mls/hr PRN DAILY  PRN IV for Mag < 1.7 on am labs 

Last administered on 6/18/20at 10:57;  Start 4/5/20 at 09:15


Sodium Chloride 90 meq/Potassium Chloride 15 meq/ Potassium Phosphate 10 mmol/ 

Magnesium Sulfate 8 meq/Calcium Gluconate 15 meq/ Multivitamins 10 ml/Chromium/ 

Copper/Manganese/ Seleni/Zn 0.5 ml/ Insulin Human Regular 25 unit/ Total 

Parenteral Nutrition/Amino Acids/Dextrose/ Fat Emulsion Intravenous 1,400 ml @  

58.333 mls/ hr TPN  CONT IV  Last administered on 4/5/20at 21:20;  Start 4/5/20 

at 22:00;  Stop 4/6/20 at 21:59;  Status DC


Sodium Chloride 1,000 ml @  1,000 mls/hr Q1H PRN IV hypotension;  Start 4/5/20 

at 12:23;  Stop 4/5/20 at 18:22;  Status DC


Albumin Human 200 ml @  200 mls/hr 1X  ONCE IV  Last administered on 4/5/20at 

13:34;  Start 4/5/20 at 12:30;  Stop 4/5/20 at 13:29;  Status DC


Diphenhydramine HCl (Benadryl) 25 mg 1X PRN  PRN IV ITCHING;  Start 4/5/20 at 

12:30;  Stop 4/6/20 at 12:29;  Status DC


Diphenhydramine HCl (Benadryl) 25 mg 1X PRN  PRN IV ITCHING;  Start 4/5/20 at 

12:30;  Stop 4/6/20 at 12:29;  Status DC


Info (PHARMACY MONITORING -- do not chart) 1 each PRN DAILY  PRN MC SEE 

COMMENTS;  Start 4/5/20 at 12:30;  Status Cancel


Bupivacaine HCl/ Epinephrine Bitart (Sensorcain-Epi 0.5%-1:988549 Mpf) 30 ml 

STK-MED ONCE .ROUTE  Last administered on 4/6/20at 11:44;  Start 4/6/20 at 

11:00;  Stop 4/6/20 at 11:01;  Status DC


Cellulose (Surgicel Fibrillar 1x2) 1 each STK-MED ONCE .ROUTE ;  Start 4/6/20 at

11:00;  Stop 4/6/20 at 11:01;  Status DC


Sodium Chloride 90 meq/Potassium Chloride 15 meq/ Potassium Phosphate 10 mmol/ 

Magnesium Sulfate 12 meq/Calcium Gluconate 15 meq/ Multivitamins 10 ml/Chromium/

Copper/Manganese/ Seleni/Zn 0.5 ml/ Insulin Human Regular 25 unit/ Total 

Parenteral Nutrition/Amino Acids/Dextrose/ Fat Emulsion Intravenous 1,400 ml @  

58.333 mls/ hr TPN  CONT IV  Last administered on 4/6/20at 22:24;  Start 4/6/20 

at 22:00;  Stop 4/7/20 at 21:59;  Status DC


Propofol 20 ml @ As Directed STK-MED ONCE IV ;  Start 4/6/20 at 11:07;  Stop 

4/6/20 at 11:07;  Status DC


Cellulose (Surgicel Hemostat 4x8) 1 each STK-MED ONCE .ROUTE  Last administered 

on 4/6/20at 11:44;  Start 4/6/20 at 11:55;  Stop 4/6/20 at 11:56;  Status DC


Sevoflurane (Ultane) 60 ml STK-MED ONCE IH ;  Start 4/6/20 at 12:46;  Stop 

4/6/20 at 12:46;  Status DC


Sodium Chloride 1,000 ml @  1,000 mls/hr Q1H PRN IV hypotension;  Start 4/6/20 

at 13:51;  Stop 4/6/20 at 19:50;  Status DC


Albumin Human 200 ml @  200 mls/hr 1X PRN  PRN IV Hypotension Last administered 

on 4/6/20at 14:51;  Start 4/6/20 at 14:00;  Stop 4/6/20 at 19:59;  Status DC


Diphenhydramine HCl (Benadryl) 25 mg 1X PRN  PRN IV ITCHING;  Start 4/6/20 at 

14:00;  Stop 4/7/20 at 13:59;  Status DC


Diphenhydramine HCl (Benadryl) 25 mg 1X PRN  PRN IV ITCHING;  Start 4/6/20 at 1

4:00;  Stop 4/7/20 at 13:59;  Status DC


Sodium Chloride 1,000 ml @  400 mls/hr Q2H30M PRN IV PATENCY;  Start 4/6/20 at 

13:51;  Stop 4/7/20 at 01:50;  Status DC


Info (PHARMACY MONITORING -- do not chart) 1 each PRN DAILY  PRN MC SEE 

COMMENTS;  Start 4/6/20 at 14:00;  Stop 4/9/20 at 08:16;  Status DC


Heparin Sodium (Porcine) (Hep Lock Adult) 500 unit STK-MED ONCE IVP ;  Start 

4/7/20 at 09:29;  Stop 4/7/20 at 09:30;  Status DC


Sodium Chloride 1,000 ml @  1,000 mls/hr Q1H PRN IV hypotension;  Start 4/7/20 

at 10:43;  Stop 4/7/20 at 16:42;  Status DC


Sodium Chloride 1,000 ml @  400 mls/hr Q2H30M PRN IV PATENCY;  Start 4/7/20 at 

10:43;  Stop 4/7/20 at 22:42;  Status DC


Info (PHARMACY MONITORING -- do not chart) 1 each PRN DAILY  PRN MC SEE 

COMMENTS;  Start 4/7/20 at 10:45;  Status UNV


Info (PHARMACY MONITORING -- do not chart) 1 each PRN DAILY  PRN MC SEE 

COMMENTS;  Start 4/7/20 at 10:45;  Status UNV


Sodium Chloride 90 meq/Potassium Chloride 15 meq/ Magnesium Sulfate 12 

meq/Calcium Gluconate 15 meq/ Multivitamins 10 ml/Chromium/ Copper/Manganese/ 

Seleni/Zn 0.5 ml/ Insulin Human Regular 25 unit/ Total Parenteral 

Nutrition/Amino Acids/Dextrose/ Fat Emulsion Intravenous 1,400 ml @  58.333 mls/

hr TPN  CONT IV  Last administered on 4/7/20at 22:13;  Start 4/7/20 at 22:00;  

Stop 4/8/20 at 21:59;  Status DC


Sodium Chloride 1,000 ml @  1,000 mls/hr Q1H PRN IV hypotension;  Start 4/8/20 

at 07:50;  Stop 4/8/20 at 13:49;  Status DC


Albumin Human 200 ml @  200 mls/hr 1X  ONCE IV ;  Start 4/8/20 at 08:00;  Stop 

4/8/20 at 08:53;  Status DC


Diphenhydramine HCl (Benadryl) 25 mg 1X PRN  PRN IV ITCHING;  Start 4/8/20 at 

08:00;  Stop 4/9/20 at 07:59;  Status DC


Diphenhydramine HCl (Benadryl) 25 mg 1X PRN  PRN IV ITCHING;  Start 4/8/20 at 

08:00;  Stop 4/9/20 at 07:59;  Status DC


Info (PHARMACY MONITORING -- do not chart) 1 each PRN DAILY  PRN MC SEE 

COMMENTS;  Start 4/8/20 at 08:00;  Stop 4/9/20 at 08:16;  Status DC


Albumin Human 50 ml @ 50 mls/hr 1X  ONCE IV ;  Start 4/8/20 at 08:53;  Stop 

4/8/20 at 08:56;  Status DC


Albumin Human 200 ml @  50 mls/hr PRN 1X  PRN IV HYPOTENSION Last administered 

on 4/14/20at 11:54;  Start 4/8/20 at 09:00;  Stop 5/21/20 at 11:14;  Status DC


Meropenem 500 mg/ Sodium Chloride 50 ml @  100 mls/hr Q12H IV  Last administered

on 4/28/20at 10:45;  Start 4/8/20 at 10:00;  Stop 4/28/20 at 12:37;  Status DC


Sodium Chloride 90 meq/Magnesium Sulfate 12 meq/ Calcium Gluconate 15 meq/ 

Multivitamins 10 ml/Chromium/ Copper/Manganese/ Seleni/Zn 0.5 ml/ Insulin Human 

Regular 25 unit/ Total Parenteral Nutrition/Amino Acids/Dextrose/ Fat Emulsion 

Intravenous 1,400 ml @  58.333 mls/ hr TPN  CONT IV  Last administered on 

4/8/20at 21:41;  Start 4/8/20 at 22:00;  Stop 4/9/20 at 21:59;  Status DC


Sodium Chloride 1,000 ml @  1,000 mls/hr Q1H PRN IV hypotension;  Start 4/9/20 

at 07:58;  Stop 4/9/20 at 13:57;  Status DC


Albumin Human 200 ml @  200 mls/hr 1X PRN  PRN IV Hypotension Last administered 

on 4/9/20at 09:30;  Start 4/9/20 at 08:00;  Stop 4/9/20 at 13:59;  Status DC


Sodium Chloride 1,000 ml @  400 mls/hr Q2H30M PRN IV PATENCY;  Start 4/9/20 at 

07:58;  Stop 4/9/20 at 19:57;  Status DC


Info (PHARMACY MONITORING -- do not chart) 1 each PRN DAILY  PRN MC SEE C

OMMENTS;  Start 4/9/20 at 08:00;  Status Cancel


Info (PHARMACY MONITORING -- do not chart) 1 each PRN DAILY  PRN MC SEE COMMENTS

;  Start 4/9/20 at 08:15;  Status UNV


Sodium Chloride 90 meq/Potassium Phosphate 5 mmol/ Magnesium Sulfate 12 

meq/Calcium Gluconate 15 meq/ Multivitamins 10 ml/Chromium/ Copper/Manganese/ 

Seleni/Zn 0.5 ml/ Insulin Human Regular 30 unit/ Total Parenteral Nu

trition/Amino Acids/Dextrose/ Fat Emulsion Intravenous 1,400 ml @  58.333 mls/ 

hr TPN  CONT IV  Last administered on 4/9/20at 22:08;  Start 4/9/20 at 22:00;  

Stop 4/10/20 at 21:59;  Status DC


Linezolid/Dextrose 300 ml @  300 mls/hr Q12HR IV  Last administered on 4/20/20at

20:40;  Start 4/10/20 at 11:00;  Stop 4/21/20 at 08:10;  Status DC


Sodium Chloride 90 meq/Potassium Phosphate 15 mmol/ Magnesium Sulfate 12 

meq/Calcium Gluconate 15 meq/ Multivitamins 10 ml/Chromium/ Copper/Manganese/ 

Seleni/Zn 0.5 ml/ Insulin Human Regular 30 unit/ Total Parenteral 

Nutrition/Amino Acids/Dextrose/ Fat Emulsion Intravenous 1,400 ml @  58.333 mls/

hr TPN  CONT IV  Last administered on 4/10/20at 21:49;  Start 4/10/20 at 22:00; 

Stop 4/11/20 at 21:59;  Status DC


Sodium Chloride 90 meq/Potassium Phosphate 15 mmol/ Magnesium Sulfate 12 

meq/Calcium Gluconate 15 meq/ Multivitamins 10 ml/Chromium/ Copper/Manganese/ 

Seleni/Zn 0.5 ml/ Insulin Human Regular 40 unit/ Total Parenteral 

Nutrition/Amino Acids/Dextrose/ Fat Emulsion Intravenous 1,400 ml @  58.333 mls/

hr TPN  CONT IV  Last administered on 4/11/20at 21:21;  Start 4/11/20 at 22:00; 

Stop 4/12/20 at 21:59;  Status DC


Sodium Chloride 1,000 ml @  1,000 mls/hr Q1H PRN IV hypotension;  Start 4/11/20 

at 13:26;  Stop 4/11/20 at 19:25;  Status DC


Albumin Human 200 ml @  200 mls/hr 1X PRN  PRN IV Hypotension Last administered 

on 4/11/20at 15:00;  Start 4/11/20 at 13:30;  Stop 4/11/20 at 19:29;  Status DC


Sodium Chloride (Normal Saline Flush) 10 ml 1X PRN  PRN IV AP catheter pack;  

Start 4/11/20 at 13:30;  Stop 4/12/20 at 13:29;  Status DC


Sodium Chloride (Normal Saline Flush) 10 ml 1X PRN  PRN IV  catheter pack;  

Start 4/11/20 at 13:30;  Stop 4/12/20 at 13:29;  Status DC


Sodium Chloride 1,000 ml @  400 mls/hr Q2H30M PRN IV PATENCY;  Start 4/11/20 at 

13:26;  Stop 4/12/20 at 01:25;  Status DC


Info (PHARMACY MONITORING -- do not chart) 1 each PRN DAILY  PRN MC SEE C

OMMENTS;  Start 4/11/20 at 13:30;  Stop 4/11/20 at 13:33;  Status DC


Info (PHARMACY MONITORING -- do not chart) 1 each PRN DAILY  PRN MC SEE 

COMMENTS;  Start 4/11/20 at 13:30;  Stop 4/11/20 at 13:34;  Status DC


Sodium Chloride 90 meq/Potassium Phosphate 19 mmol/ Magnesium Sulfate 12 

meq/Calcium Gluconate 15 meq/ Multivitamins 10 ml/Chromium/ Copper/Manganese/ 

Seleni/Zn 0.5 ml/ Insulin Human Regular 40 unit/ Total Parenteral 

Nutrition/Amino Acids/Dextrose/ Fat Emulsion Intravenous 1,400 ml @  58.333 mls/

hr TPN  CONT IV  Last administered on 4/12/20at 21:54;  Start 4/12/20 at 22:00; 

Stop 4/13/20 at 21:59;  Status DC


Sodium Chloride 1,000 ml @  1,000 mls/hr Q1H PRN IV hypotension;  Start 4/13/20 

at 09:35;  Stop 4/13/20 at 15:34;  Status DC


Albumin Human 200 ml @  200 mls/hr 1X PRN  PRN IV Hypotension;  Start 4/13/20 at

09:45;  Stop 4/13/20 at 15:44;  Status DC


Diphenhydramine HCl (Benadryl) 25 mg 1X PRN  PRN IV ITCHING;  Start 4/13/20 at 

09:45;  Stop 4/14/20 at 09:44;  Status DC


Diphenhydramine HCl (Benadryl) 25 mg 1X PRN  PRN IV ITCHING;  Start 4/13/20 at 

09:45;  Stop 4/14/20 at 09:44;  Status DC


Sodium Chloride 1,000 ml @  400 mls/hr Q2H30M PRN IV PATENCY;  Start 4/13/20 at 

09:35;  Stop 4/13/20 at 21:34;  Status DC


Info (PHARMACY MONITORING -- do not chart) 1 each PRN DAILY  PRN MC SEE 

COMMENTS;  Start 4/13/20 at 09:45;  Status Cancel


Sodium Chloride 100 meq/Potassium Phosphate 19 mmol/ Magnesium Sulfate 12 

meq/Calcium Gluconate 15 meq/ Multivitamins 10 ml/Chromium/ Copper/Manganese/ 

Seleni/Zn 0.5 ml/ Insulin Human Regular 40 unit/ Potassium Chloride 20 meq/ 

Total Parenteral Nutrition/Amino Acids/Dextrose/ Fat Emulsion Intravenous 1,400 

ml @  58.333 mls/ hr TPN  CONT IV  Last administered on 4/13/20at 22:02;  Start 

4/13/20 at 22:00;  Stop 4/14/20 at 21:59;  Status DC


Furosemide (Lasix) 40 mg 1X  ONCE IVP  Last administered on 4/13/20at 14:39;  

Start 4/13/20 at 14:30;  Stop 4/13/20 at 14:31;  Status DC


Metronidazole 100 ml @  100 mls/hr Q8HRS IV  Last administered on 4/21/20at 

06:04;  Start 4/14/20 at 10:00;  Stop 4/21/20 at 08:10;  Status DC


Sodium Chloride 1,000 ml @  1,000 mls/hr Q1H PRN IV hypotension;  Start 4/14/20 

at 08:00;  Stop 4/14/20 at 13:59;  Status DC


Albumin Human 200 ml @  200 mls/hr 1X PRN  PRN IV Hypotension;  Start 4/14/20 at

08:00;  Stop 4/14/20 at 13:59;  Status DC


Sodium Chloride 1,000 ml @  400 mls/hr Q2H30M PRN IV PATENCY;  Start 4/14/20 at 

08:00;  Stop 4/14/20 at 19:59;  Status DC


Info (PHARMACY MONITORING -- do not chart) 1 each PRN DAILY  PRN MC SEE 

COMMENTS;  Start 4/14/20 at 11:30;  Status UNV


Info (PHARMACY MONITORING -- do not chart) 1 each PRN DAILY  PRN MC SEE 

COMMENTS;  Start 4/14/20 at 11:30;  Stop 4/16/20 at 12:13;  Status DC


Sodium Chloride 100 meq/Potassium Phosphate 19 mmol/ Magnesium Sulfate 12 

meq/Calcium Gluconate 15 meq/ Multivitamins 10 ml/Chromium/ Copper/Manganese/ 

Seleni/Zn 0.5 ml/ Insulin Human Regular 40 unit/ Potassium Chloride 20 meq/ 

Total Parenteral Nutrition/Amino Acids/Dextrose/ Fat Emulsion Intravenous 1,400 

ml @  58.333 mls/ hr TPN  CONT IV  Last administered on 4/14/20at 21:52;  Start 

4/14/20 at 22:00;  Stop 4/15/20 at 21:59;  Status DC


Sodium Chloride (Normal Saline Flush) 10 ml QSHIFT  PRN IV AFTER MEDS AND BLOOD 

DRAWS;  Start 4/14/20 at 15:00;  Stop 5/12/20 at 11:27;  Status DC


Sodium Chloride (Normal Saline Flush) 10 ml PRN Q5MIN  PRN IV AFTER MEDS AND 

BLOOD DRAWS;  Start 4/14/20 at 15:00


Sodium Chloride (Normal Saline Flush) 20 ml PRN Q5MIN  PRN IV AFTER MEDS AND 

BLOOD DRAWS;  Start 4/14/20 at 15:00


Sodium Chloride 100 meq/Potassium Phosphate 19 mmol/ Magnesium Sulfate 12 

meq/Calcium Gluconate 15 meq/ Multivitamins 10 ml/Chromium/ Copper/Manganese/ 

Seleni/Zn 0.5 ml/ Insulin Human Regular 40 unit/ Potassium Chloride 20 meq/ 

Total Parenteral Nutrition/Amino Acids/Dextrose/ Fat Emulsion Intravenous 1,400 

ml @  58.333 mls/ hr TPN  CONT IV  Last administered on 4/15/20at 21:20;  Start 

4/15/20 at 22:00;  Stop 4/16/20 at 21:59;  Status DC


Lidocaine HCl (Buffered Lidocaine 1%) 3 ml STK-MED ONCE .ROUTE ;  Start 4/15/20 

at 13:16;  Stop 4/15/20 at 13:16;  Status DC


Lidocaine HCl (Buffered Lidocaine 1%) 6 ml 1X  ONCE INJ  Last administered on 

4/15/20at 13:45;  Start 4/15/20 at 13:30;  Stop 4/15/20 at 13:31;  Status DC


Albumin Human 100 ml @  100 mls/hr 1X  ONCE IV  Last administered on 4/15/20at 

15:41;  Start 4/15/20 at 15:00;  Stop 4/15/20 at 15:59;  Status DC


Albumin Human 50 ml @ 50 mls/hr 1X  ONCE IV  Last administered on 4/15/20at 

15:00;  Start 4/15/20 at 15:00;  Stop 4/15/20 at 15:59;  Status DC


Info (PHARMACY MONITORING -- do not chart) 1 each PRN DAILY  PRN MC SEE 

COMMENTS;  Start 4/16/20 at 11:30;  Status Cancel


Info (PHARMACY MONITORING -- do not chart) 1 each PRN DAILY  PRN MC SEE MIKEY DISLA;  Start 4/16/20 at 11:30;  Status UNV


Sodium Chloride 100 meq/Potassium Phosphate 10 mmol/ Magnesium Sulfate 12 

meq/Calcium Gluconate 15 meq/ Multivitamins 10 ml/Chromium/ Copper/Manganese/ 

Seleni/Zn 0.5 ml/ Insulin Human Regular 35 unit/ Potassium Chloride 20 meq/ 

Total Parenteral Nutrition/Amino Acids/Dextrose/ Fat Emulsion Intravenous 1,400 

ml @  58.333 mls/ hr TPN  CONT IV  Last administered on 4/16/20at 22:10;  Start 

4/16/20 at 22:00;  Stop 4/17/20 at 21:59;  Status DC


Sodium Chloride 100 meq/Potassium Phosphate 5 mmol/ Magnesium Sulfate 12 

meq/Calcium Gluconate 15 meq/ Multivitamins 10 ml/Chromium/ Copper/Manganese/ 

Seleni/Zn 0.5 ml/ Insulin Human Regular 35 unit/ Potassium Chloride 20 meq/ 

Total Parenteral Nutrition/Amino Acids/Dextrose/ Fat Emulsion Intravenous 1,400 

ml @  58.333 mls/ hr TPN  CONT IV  Last administered on 4/17/20at 22:59;  Start 

4/17/20 at 22:00;  Stop 4/18/20 at 21:59;  Status DC


Sodium Chloride 1,000 ml @  1,000 mls/hr Q1H PRN IV hypotension;  Start 4/18/20 

at 08:27;  Stop 4/18/20 at 14:26;  Status DC


Albumin Human 200 ml @  200 mls/hr 1X PRN  PRN IV Hypotension Last administered 

on 4/18/20at 09:18;  Start 4/18/20 at 08:30;  Stop 4/18/20 at 14:29;  Status DC


Sodium Chloride 1,000 ml @  400 mls/hr Q2H30M PRN IV PATENCY;  Start 4/18/20 at 

08:27;  Stop 4/18/20 at 20:26;  Status DC


Info (PHARMACY MONITORING -- do not chart) 1 each PRN DAILY  PRN MC SEE COMMENT

S;  Start 4/18/20 at 08:30;  Status Cancel


Info (PHARMACY MONITORING -- do not chart) 1 each PRN DAILY  PRN MC SEE 

COMMENTS;  Start 4/18/20 at 08:30;  Stop 4/26/20 at 13:10;  Status DC


Sodium Chloride 100 meq/Potassium Chloride 40 meq/ Magnesium Sulfate 15 

meq/Calcium Gluconate 15 meq/ Multivitamins 10 ml/Chromium/ Copper/Manganese/ 

Seleni/Zn 0.5 ml/ Insulin Human Regular 35 unit/ Total Parenteral 

Nutrition/Amino Acids/Dextrose/ Fat Emulsion Intravenous 1,400 ml @  58.333 mls/

hr TPN  CONT IV  Last administered on 4/18/20at 22:00;  Start 4/18/20 at 22:00; 

Stop 4/19/20 at 21:59;  Status DC


Potassium Chloride/Water 100 ml @  100 mls/hr 1X  ONCE IV  Last administered on 

4/18/20at 17:28;  Start 4/18/20 at 14:45;  Stop 4/18/20 at 15:44;  Status DC


Sodium Chloride 100 meq/Potassium Chloride 40 meq/ Magnesium Sulfate 15 

meq/Calcium Gluconate 15 meq/ Multivitamins 10 ml/Chromium/ Copper/Manganese/ 

Seleni/Zn 0.5 ml/ Insulin Human Regular 35 unit/ Total Parenteral 

Nutrition/Amino Acids/Dextrose/ Fat Emulsion Intravenous 1,400 ml @  58.333 mls/

hr TPN  CONT IV  Last administered on 4/19/20at 22:46;  Start 4/19/20 at 22:00; 

Stop 4/20/20 at 21:59;  Status DC


Sodium Chloride 100 meq/Potassium Chloride 40 meq/ Magnesium Sulfate 20 

meq/Calcium Gluconate 15 meq/ Multivitamins 10 ml/Chromium/ Copper/Manganese/ 

Seleni/Zn 0.5 ml/ Insulin Human Regular 35 unit/ Total Parenteral 

Nutrition/Amino Acids/Dextrose/ Fat Emulsion Intravenous 1,400 ml @  58.333 mls/

hr TPN  CONT IV  Last administered on 4/20/20at 22:31;  Start 4/20/20 at 22:00; 

Stop 4/21/20 at 21:59;  Status DC


Fentanyl Citrate (Fentanyl 2ml Vial) 50 mcg PRN Q2HR  PRN IVP PAIN Last 

administered on 4/27/20at 13:32;  Start 4/20/20 at 21:00;  Stop 4/28/20 at 

12:53;  Status DC


Fentanyl Citrate (Fentanyl 2ml Vial) 25 mcg PRN Q2HR  PRN IVP PAIN;  Start 

4/20/20 at 21:00;  Stop 4/28/20 at 12:54;  Status DC


Enoxaparin Sodium (Lovenox 100mg Syringe) 100 mg Q12HR SQ ;  Start 4/21/20 at 

21:00;  Status UNV


Amino Acids/ Glycerin/ Electrolytes 1,000 ml @  75 mls/hr [id number] IV ;  Start 4/ 20/20 at 21:15;  Status UNV


Sodium Chloride 1,000 ml @  1,000 mls/hr Q1H PRN IV hypotension;  Start 4/21/20 

at 07:56;  Stop 4/21/20 at 13:55;  Status DC


Albumin Human 200 ml @  200 mls/hr 1X PRN  PRN IV Hypotension Last administered 

on 4/21/20at 08:40;  Start 4/21/20 at 08:00;  Stop 4/21/20 at 13:59;  Status DC


Sodium Chloride 1,000 ml @  400 mls/hr Q2H30M PRN IV PATENCY;  Start 4/21/20 at 

07:56;  Stop 4/21/20 at 19:55;  Status DC


Info (PHARMACY MONITORING -- do not chart) 1 each PRN DAILY  PRN MC SEE COMME

NTS;  Start 4/21/20 at 08:00;  Status UNV


Info (PHARMACY MONITORING -- do not chart) 1 each PRN DAILY  PRN MC SEE 

COMMENTS;  Start 4/21/20 at 08:00;  Status UNV


Daptomycin 430 mg/ Sodium Chloride 50 ml @  100 mls/hr Q24H IV  Last 

administered on 4/21/20at 12:35;  Start 4/21/20 at 09:00;  Stop 4/21/20 at 

12:49;  Status DC


Sodium Chloride 100 meq/Potassium Chloride 40 meq/ Magnesium Sulfate 20 

meq/Calcium Gluconate 15 meq/ Multivitamins 10 ml/Chromium/ Copper/Manganese/ 

Seleni/Zn 0.5 ml/ Insulin Human Regular 35 unit/ Total Parenteral 

Nutrition/Amino Acids/Dextrose/ Fat Emulsion Intravenous 1,400 ml @  58.333 mls/

hr TPN  CONT IV  Last administered on 4/21/20at 21:26;  Start 4/21/20 at 22:00; 

Stop 4/22/20 at 21:59;  Status DC


Daptomycin 430 mg/ Sodium Chloride 50 ml @  100 mls/hr Q48H IV ;  Start 4/23/20 

at 09:00;  Stop 4/22/20 at 11:55;  Status DC


Sodium Chloride 100 meq/Potassium Chloride 40 meq/ Magnesium Sulfate 20 

meq/Calcium Gluconate 15 meq/ Multivitamins 10 ml/Chromium/ Copper/Manganese/ 

Seleni/Zn 0.5 ml/ Insulin Human Regular 35 unit/ Total Parenteral 

Nutrition/Amino Acids/Dextrose/ Fat Emulsion Intravenous 1,400 ml @  58.333 mls/

hr TPN  CONT IV  Last administered on 4/22/20at 22:27;  Start 4/22/20 at 22:00; 

Stop 4/23/20 at 21:59;  Status DC


Daptomycin 430 mg/ Sodium Chloride 50 ml @  100 mls/hr Q24H IV  Last 

administered on 4/24/20at 15:07;  Start 4/22/20 at 13:00;  Stop 4/25/20 at 

13:15;  Status DC


Sodium Chloride 100 meq/Potassium Chloride 40 meq/ Magnesium Sulfate 20 

meq/Calcium Gluconate 10 meq/ Multivitamins 10 ml/Chromium/ Copper/Manganese/ 

Seleni/Zn 0.5 ml/ Insulin Human Regular 35 unit/ Total Parenteral Nutrition/Amin

o Acids/Dextrose/ Fat Emulsion Intravenous 1,400 ml @  58.333 mls/ hr TPN  CONT 

IV  Last administered on 4/24/20at 00:06;  Start 4/23/20 at 22:00;  Stop 4/24/20

at 21:59;  Status DC


Alteplase, Recombinant (Cathflo For Central Catheter Clearance) 1 mg 1X  ONCE 

INT CAT  Last administered on 4/24/20at 11:44;  Start 4/24/20 at 10:45;  Stop 

4/24/20 at 10:46;  Status DC


Ondansetron HCl (Zofran) 4 mg PRN Q6HRS  PRN IV NAUSEA/VOMITING;  Start 4/27/20 

at 07:00;  Stop 4/28/20 at 06:59;  Status DC


Fentanyl Citrate (Fentanyl 2ml Vial) 25 mcg PRN Q5MIN  PRN IV MILD PAIN 1-3;  

Start 4/27/20 at 07:00;  Stop 4/28/20 at 06:59;  Status DC


Fentanyl Citrate (Fentanyl 2ml Vial) 50 mcg PRN Q5MIN  PRN IV MODERATE TO SEVERE

PAIN Last administered on 4/27/20at 10:17;  Start 4/27/20 at 07:00;  Stop 

4/28/20 at 06:59;  Status DC


Ringer's Solution 1,000 ml @  30 mls/hr Q24H IV ;  Start 4/27/20 at 07:00;  Stop

4/27/20 at 18:59;  Status DC


Lidocaine HCl (Xylocaine-Mpf 1% 2ml Vial) 2 ml PRN 1X  PRN ID PRIOR TO IV START;

 Start 4/27/20 at 07:00;  Stop 4/28/20 at 06:59;  Status DC


Prochlorperazine Edisylate (Compazine) 5 mg PACU PRN  PRN IV NAUSEA, MRX1;  

Start 4/27/20 at 07:00;  Stop 4/28/20 at 06:59;  Status DC


Sodium Acetate 50 meq/Potassium Acetate 55 meq/ Magnesium Sulfate 20 meq/Calcium

Gluconate 10 meq/ Multivitamins 10 ml/Chromium/ Copper/Manganese/ Seleni/Zn 0.5 

ml/ Insulin Human Regular 35 unit/ Total Parenteral Nutrition/Amino 

Acids/Dextrose/ Fat Emulsion Intravenous 1,400 ml @  58.333 mls/ hr TPN  CONT IV

;  Start 4/24/20 at 22:00;  Stop 4/24/20 at 14:15;  Status DC


Sodium Acetate 50 meq/Potassium Acetate 55 meq/ Magnesium Sulfate 20 meq/Calcium

Gluconate 10 meq/ Multivitamins 10 ml/Chromium/ Copper/Manganese/ Seleni/Zn 0.5 

ml/ Insulin Human Regular 35 unit/ Total Parenteral Nutrition/Amino 

Acids/Dextrose/ Fat Emulsion Intravenous 1,800 ml @  75 mls/hr TPN  CONT IV  

Last administered on 4/24/20at 22:38;  Start 4/24/20 at 22:00;  Stop 4/25/20 at 

21:59;  Status DC


Sodium Chloride 1,000 ml @  1,000 mls/hr Q1H PRN IV hypotension;  Start 4/24/20 

at 15:31;  Stop 4/24/20 at 21:30;  Status DC


Diphenhydramine HCl (Benadryl) 25 mg 1X PRN  PRN IV ITCHING;  Start 4/24/20 at 

15:45;  Stop 4/25/20 at 15:44;  Status DC


Diphenhydramine HCl (Benadryl) 25 mg 1X PRN  PRN IV ITCHING;  Start 4/24/20 at 

15:45;  Stop 4/25/20 at 15:44;  Status DC


Sodium Chloride 1,000 ml @  400 mls/hr Q2H30M PRN IV PATENCY;  Start 4/24/20 at 

15:31;  Stop 4/25/20 at 03:30;  Status DC


Info (PHARMACY MONITORING -- do not chart) 1 each PRN DAILY  PRN MC SEE 

COMMENTS;  Start 4/24/20 at 15:45;  Stop 5/26/20 at 14:14;  Status DC


Sodium Acetate 50 meq/Potassium Acetate 55 meq/ Magnesium Sulfate 20 meq/Calcium

Gluconate 10 meq/ Multivitamins 10 ml/Chromium/ Copper/Manganese/ Seleni/Zn 0.5 

ml/ Insulin Human Regular 35 unit/ Total Parenteral Nutrition/Amino 

Acids/Dextrose/ Fat Emulsion Intravenous 1,800 ml @  75 mls/hr TPN  CONT IV  

Last administered on 4/25/20at 22:03;  Start 4/25/20 at 22:00;  Stop 4/26/20 at 

21:59;  Status DC


Daptomycin 430 mg/ Sodium Chloride 50 ml @  100 mls/hr Q24H IV  Last 

administered on 4/30/20at 13:00;  Start 4/25/20 at 13:00;  Stop 4/30/20 at 

20:58;  Status DC


Heparin Sodium (Porcine) 1000 unit/Sodium Chloride 1,001 ml @  1,001 mls/hr 1X  

ONCE IRR ;  Start 4/27/20 at 06:00;  Stop 4/27/20 at 06:59;  Status DC


Potassium Acetate 55 meq/Magnesium Sulfate 20 meq/ Calcium Gluconate 10 meq/ 

Multivitamins 10 ml/Chromium/ Copper/Manganese/ Seleni/Zn 0.5 ml/ Insulin Human 

Regular 35 unit/ Total Parenteral Nutrition/Amino Acids/Dextrose/ Fat Emulsion 

Intravenous 1,920 ml @  80 mls/hr TPN  CONT IV  Last administered on 4/26/20at 

22:10;  Start 4/26/20 at 22:00;  Stop 4/27/20 at 21:59;  Status DC


Dexamethasone Sodium Phosphate (Decadron) 4 mg STK-MED ONCE .ROUTE ;  Start 

4/27/20 at 10:56;  Stop 4/27/20 at 10:57;  Status DC


Ondansetron HCl (Zofran) 4 mg STK-MED ONCE .ROUTE ;  Start 4/27/20 at 10:56;  

Stop 4/27/20 at 10:57;  Status DC


Rocuronium Bromide (Zemuron) 50 mg STK-MED ONCE .ROUTE ;  Start 4/27/20 at 

10:56;  Stop 4/27/20 at 10:57;  Status DC


Fentanyl Citrate (Fentanyl 2ml Vial) 100 mcg STK-MED ONCE .ROUTE ;  Start 

4/27/20 at 10:56;  Stop 4/27/20 at 10:57;  Status DC


Bupivacaine HCl/ Epinephrine Bitart (Sensorcain-Epi 0.5%-1:600733 Mpf) 30 ml 

STK-MED ONCE .ROUTE  Last administered on 4/27/20at 12:01;  Start 4/27/20 at 

10:58;  Stop 4/27/20 at 10:58;  Status DC


Cellulose (Surgicel Hemostat 2x14) 1 each STK-MED ONCE .ROUTE ;  Start 4/27/20 

at 10:58;  Stop 4/27/20 at 10:59;  Status DC


Iohexol (Omnipaque 300 Mg/ml) 50 ml STK-MED ONCE .ROUTE ;  Start 4/27/20 at 

10:58;  Stop 4/27/20 at 10:59;  Status DC


Cellulose (Surgicel Hemostat 4x8) 1 each STK-MED ONCE .ROUTE ;  Start 4/27/20 at

10:58;  Stop 4/27/20 at 10:59;  Status DC


Bisacodyl (Dulcolax Supp) 10 mg STK-MED ONCE .ROUTE ;  Start 4/27/20 at 10:59;  

Stop 4/27/20 at 10:59;  Status DC


Heparin Sodium (Porcine) 1000 unit/Sodium Chloride 1,001 ml @  1,001 mls/hr 1X  

ONCE IRR ;  Start 4/27/20 at 12:00;  Stop 4/27/20 at 12:59;  Status DC


Propofol 20 ml @ As Directed STK-MED ONCE IV ;  Start 4/27/20 at 11:05;  Stop 

4/27/20 at 11:05;  Status DC


Sevoflurane (Ultane) 90 ml STK-MED ONCE IH ;  Start 4/27/20 at 11:05;  Stop 

4/27/20 at 11:05;  Status DC


Sevoflurane (Ultane) 60 ml STK-MED ONCE IH ;  Start 4/27/20 at 12:26;  Stop 

4/27/20 at 12:27;  Status DC


Propofol 20 ml @ As Directed STK-MED ONCE IV ;  Start 4/27/20 at 12:26;  Stop 

4/27/20 at 12:27;  Status DC


Phenylephrine HCl (PHENYLEPHRINE in 0.9% NACL PF) 1 mg STK-MED ONCE IV ;  Start 

4/27/20 at 12:34;  Stop 4/27/20 at 12:34;  Status DC


Heparin Sodium (Porcine) (Heparin Sodium) 5,000 unit Q12HR SQ  Last administered

on 5/6/20at 20:57;  Start 4/27/20 at 21:00;  Stop 5/7/20 at 09:59;  Status DC


Sodium Chloride (Normal Saline Flush) 3 ml QSHIFT  PRN IV AFTER MEDS AND BLOOD 

DRAWS;  Start 4/27/20 at 13:45;  Status Cancel


Naloxone HCl (Narcan) 0.4 mg PRN Q2MIN  PRN IV SEE INSTRUCTIONS Last 

administered on 6/6/20at 15:15;  Start 4/27/20 at 13:45;  Stop 7/1/20 at 16:00; 

Status DC


Sodium Chloride 1,000 ml @  25 mls/hr Q24H IV  Last administered on 5/26/20at 

13:37;  Start 4/27/20 at 13:37;  Stop 5/29/20 at 13:09;  Status DC


Naloxone HCl (Narcan) 0.4 mg PRN Q2MIN  PRN IV SEE INSTRUCTIONS;  Start 4/27/20 

at 14:30;  Status UNV


Sodium Chloride 1,000 ml @  25 mls/hr Q24H IV ;  Start 4/27/20 at 14:30;  Status

UNV


Hydromorphone HCl 30 ml @ 0 mls/hr CONT PRN  PRN IV PER PROTOCOL Last 

administered on 5/2/20at 16:08;  Start 4/27/20 at 14:30;  Stop 5/4/20 at 08:55; 

Status DC


Potassium Acetate 55 meq/Magnesium Sulfate 20 meq/ Calcium Gluconate 10 meq/ 

Multivitamins 10 ml/Chromium/ Copper/Manganese/ Seleni/Zn 0.5 ml/ Insulin Human 

Regular 35 unit/ Total Parenteral Nutrition/Amino Acids/Dextrose/ Fat Emulsion 

Intravenous 1,920 ml @  80 mls/hr TPN  CONT IV  Last administered on 4/27/20at 

22:01;  Start 4/27/20 at 22:00;  Stop 4/28/20 at 21:59;  Status DC


Bumetanide (Bumex) 2 mg [id number] IV  Last administered on 5/1/20at 13:50;  Start 

4/28/20 at 14:00;  Stop 5/2/20 at 14:10;  Status DC


Meropenem 1 gm/ Sodium Chloride 100 ml @  200 mls/hr Q8HRS IV  Last administered

on 5/22/20at 05:53;  Start 4/28/20 at 14:00;  Stop 5/22/20 at 09:31;  Status DC


Potassium Acetate 55 meq/Magnesium Sulfate 20 meq/ Calcium Gluconate 10 meq/ 

Multivitamins 10 ml/Chromium/ Copper/Manganese/ Seleni/Zn 0.5 ml/ Insulin Human 

Regular 35 unit/ Total Parenteral Nutrition/Amino Acids/Dextrose/ Fat Emulsion 

Intravenous 1,920 ml @  80 mls/hr TPN  CONT IV  Last administered on 4/28/20at 

22:02;  Start 4/28/20 at 22:00;  Stop 4/29/20 at 21:59;  Status DC


Hydromorphone HCl (Dilaudid Standard PCA) 12 mg STK-MED ONCE IV ;  Start 4/27/20

at 14:35;  Stop 4/28/20 at 13:53;  Status DC


Artificial Tears (Artificial Tears) 1 drop PRN Q15MIN  PRN OU DRY EYE Last 

administered on 6/23/20at 21:17;  Start 4/29/20 at 05:30


Hydromorphone HCl (Dilaudid Standard PCA) 12 mg STK-MED ONCE IV ;  Start 4/28/20

at 12:05;  Stop 4/29/20 at 09:15;  Status DC


Potassium Acetate 65 meq/Magnesium Sulfate 20 meq/ Calcium Gluconate 10 meq/ 

Multivitamins 10 ml/Chromium/ Copper/Manganese/ Seleni/Zn 0.5 ml/ Insulin Human 

Regular 30 unit/ Total Parenteral Nutrition/Amino Acids/Dextrose/ Fat Emulsion 

Intravenous 1,920 ml @  80 mls/hr TPN  CONT IV  Last administered on 4/29/20at 

22:22;  Start 4/29/20 at 22:00;  Stop 4/30/20 at 21:59;  Status DC


Cyclobenzaprine HCl (Flexeril) 10 mg PRN Q6HRS  PRN PO MUSCLE SPASMS;  Start 

4/30/20 at 10:45


Potassium Acetate 55 meq/Magnesium Sulfate 20 meq/ Calcium Gluconate 10 meq/ 

Multivitamins 10 ml/Chromium/ Copper/Manganese/ Seleni/Zn 0.5 ml/ Insulin Human 

Regular 30 unit/ Total Parenteral Nutrition/Amino Acids/Dextrose/ Fat Emulsion 

Intravenous 1,920 ml @  80 mls/hr TPN  CONT IV  Last administered on 5/1/20at 

01:00;  Start 4/30/20 at 22:00;  Stop 5/1/20 at 21:59;  Status DC


Magnesium Sulfate 50 ml @ 25 mls/hr 1X  ONCE IV  Last administered on 4/30/20at 

17:18;  Start 4/30/20 at 12:45;  Stop 4/30/20 at 14:44;  Status DC


Potassium Chloride/Water 100 ml @  100 mls/hr 1X  ONCE IV  Last administered on 

5/1/20at 11:27;  Start 5/1/20 at 12:00;  Stop 5/1/20 at 12:59;  Status DC


Hydromorphone HCl (Dilaudid Standard PCA) 12 mg STK-MED ONCE IV ;  Start 4/29/20

at 10:50;  Stop 5/1/20 at 11:02;  Status DC


Hydromorphone HCl (Dilaudid Standard PCA) 12 mg STK-MED ONCE IV ;  Start 4/30/20

at 13:47;  Stop 5/1/20 at 11:03;  Status DC


Potassium Acetate 30 meq/Magnesium Sulfate 20 meq/ Calcium Gluconate 10 meq/ 

Multivitamins 10 ml/Chromium/ Copper/Manganese/ Seleni/Zn 0.5 ml/ Insulin Human 

Regular 30 unit/ Potassium Chloride 30 meq/ Total Parenteral Nutrition/Amino 

Acids/Dextrose/ Fat Emulsion Intravenous 1,920 ml @  80 mls/hr TPN  CONT IV  

Last administered on 5/1/20at 22:34;  Start 5/1/20 at 22:00;  Stop 5/2/20 at 

21:59;  Status DC


Potassium Chloride/Water 100 ml @  100 mls/hr Q1H IV  Last administered on 

5/2/20at 13:05;  Start 5/2/20 at 07:00;  Stop 5/2/20 at 10:59;  Status DC


Magnesium Sulfate 50 ml @ 25 mls/hr 1X  ONCE IV  Last administered on 5/2/20at 

10:34;  Start 5/2/20 at 10:30;  Stop 5/2/20 at 12:29;  Status DC


Potassium Chloride 75 meq/ Magnesium Sulfate 20 meq/Calcium Gluconate 10 meq/ 

Multivitamins 10 ml/Chromium/ Copper/Manganese/ Seleni/Zn 0.5 ml/ Insulin Human 

Regular 30 unit/ Total Parenteral Nutrition/Amino Acids/Dextrose/ Fat Emulsion 

Intravenous 1,920 ml @  80 mls/hr TPN  CONT IV  Last administered on 5/2/20at 

21:51;  Start 5/2/20 at 22:00;  Stop 5/3/20 at 22:00;  Status DC


Potassium Chloride 75 meq/ Magnesium Sulfate 20 meq/Calcium Gluconate 10 meq/ 

Multivitamins 10 ml/Chromium/ Copper/Manganese/ Seleni/Zn 0.5 ml/ Insulin Human 

Regular 25 unit/ Total Parenteral Nutrition/Amino Acids/Dextrose/ Fat Emulsion 

Intravenous 1,920 ml @  80 mls/hr TPN  CONT IV  Last administered on 5/3/20at 2

2:04;  Start 5/3/20 at 22:00;  Stop 5/4/20 at 21:59;  Status DC


Hydromorphone HCl (Dilaudid) 0.4 mg PRN Q4HRS  PRN IVP PAIN Last administered on

5/4/20at 10:57;  Start 5/4/20 at 09:00;  Stop 5/4/20 at 18:59;  Status DC


Micafungin Sodium 100 mg/Dextrose 100 ml @  100 mls/hr Q24H IV  Last 

administered on 5/26/20at 12:17;  Start 5/4/20 at 11:00;  Stop 5/27/20 at 09:59;

 Status DC


Daptomycin 485 mg/ Sodium Chloride 50 ml @  100 mls/hr Q24H IV  Last 

administered on 5/11/20at 13:10;  Start 5/4/20 at 11:00;  Stop 5/12/20 at 07:44;

 Status DC


Potassium Chloride 75 meq/ Magnesium Sulfate 15 meq/Calcium Gluconate 8 meq/ 

Multivitamins 10 ml/Chromium/ Copper/Manganese/ Seleni/Zn 0.5 ml/ Insulin Human 

Regular 25 unit/ Total Parenteral Nutrition/Amino Acids/Dextrose/ Fat Emulsion 

Intravenous 1,920 ml @  80 mls/hr TPN  CONT IV  Last administered on 5/4/20at 

23:08;  Start 5/4/20 at 22:00;  Stop 5/5/20 at 21:59;  Status DC


Haloperidol Lactate (Haldol Inj) 3 mg 1X  ONCE IVP  Last administered on 

5/4/20at 14:37;  Start 5/4/20 at 14:30;  Stop 5/4/20 at 14:31;  Status DC


Hydromorphone HCl (Dilaudid) 1 mg PRN Q4HRS  PRN IVP PAIN Last administered on 

5/18/20at 06:25;  Start 5/4/20 at 19:00;  Stop 5/18/20 at 17:10;  Status DC


Potassium Chloride 75 meq/ Magnesium Sulfate 15 meq/Calcium Gluconate 8 meq/ 

Multivitamins 10 ml/Chromium/ Copper/Manganese/ Seleni/Zn 0.5 ml/ Insulin Human 

Regular 20 unit/ Total Parenteral Nutrition/Amino Acids/Dextrose/ Fat Emulsion 

Intravenous 1,920 ml @  80 mls/hr TPN  CONT IV  Last administered on 5/5/20at 

22:10;  Start 5/5/20 at 22:00;  Stop 5/6/20 at 21:59;  Status DC


Lidocaine HCl (Buffered Lidocaine 1%) 3 ml STK-MED ONCE .ROUTE ;  Start 5/6/20 

at 11:31;  Stop 5/6/20 at 11:31;  Status DC


Lidocaine HCl (Buffered Lidocaine 1%) 3 ml STK-MED ONCE .ROUTE ;  Start 5/6/20 

at 12:28;  Stop 5/6/20 at 12:29;  Status DC


Lidocaine HCl (Buffered Lidocaine 1%) 6 ml 1X  ONCE INJ  Last administered on 

5/6/20at 12:53;  Start 5/6/20 at 12:45;  Stop 5/6/20 at 12:46;  Status DC


Potassium Chloride 75 meq/ Magnesium Sulfate 15 meq/Calcium Gluconate 8 meq/ 

Multivitamins 10 ml/Chromium/ Copper/Manganese/ Seleni/Zn 0.5 ml/ Insulin Human 

Regular 20 unit/ Total Parenteral Nutrition/Amino Acids/Dextrose/ Fat Emulsion 

Intravenous 1,920 ml @  80 mls/hr TPN  CONT IV  Last administered on 5/6/20at 

22:00;  Start 5/6/20 at 22:00;  Stop 5/7/20 at 21:59;  Status DC


Potassium Chloride 75 meq/ Magnesium Sulfate 15 meq/Calcium Gluconate 8 meq/ 

Multivitamins 10 ml/Chromium/ Copper/Manganese/ Seleni/Zn 0.5 ml/ Insulin Human 

Regular 15 unit/ Total Parenteral Nutrition/Amino Acids/Dextrose/ Fat Emulsion 

Intravenous 1,920 ml @  80 mls/hr TPN  CONT IV  Last administered on 5/7/20at 

22:28;  Start 5/7/20 at 22:00;  Stop 5/8/20 at 21:59;  Status DC


Vecuronium Bromide (Norcuron Bolus) 6 mg PRN Q6HRS  PRN IV VENT ASYNCHRONY;  

Start 5/7/20 at 19:15;  Stop 5/7/20 at 19:35;  Status DC


Bumetanide (Bumex) 2 mg 1X  ONCE IV  Last administered on 5/7/20at 22:09;  Start

5/7/20 at 19:45;  Stop 5/7/20 at 19:46;  Status DC


Lidocaine HCl (Buffered Lidocaine 1%) 3 ml STK-MED ONCE .ROUTE ;  Start 5/8/20 

at 07:59;  Stop 5/8/20 at 07:59;  Status DC


Midazolam HCl (Versed) 5 mg STK-MED ONCE .ROUTE ;  Start 5/8/20 at 08:36;  Stop 

5/8/20 at 08:36;  Status DC


Fentanyl Citrate (Fentanyl 5ml Vial) 250 mcg STK-MED ONCE .ROUTE ;  Start 5/8/20

at 08:36;  Stop 5/8/20 at 08:37;  Status DC


Lidocaine HCl (Buffered Lidocaine 1%) 3 ml 1X  ONCE IJ  Last administered on 

5/8/20at 09:30;  Start 5/8/20 at 09:15;  Stop 5/8/20 at 09:16;  Status DC


Midazolam HCl (Versed) 5 mg 1X  ONCE IV  Last administered on 5/8/20at 09:30;  

Start 5/8/20 at 09:15;  Stop 5/8/20 at 09:16;  Status DC


Fentanyl Citrate (Fentanyl 5ml Vial) 250 mcg 1X  ONCE IV  Last administered on 

5/8/20at 09:30;  Start 5/8/20 at 09:15;  Stop 5/8/20 at 09:16;  Status DC


Bumetanide (Bumex) 2 mg DAILY IV  Last administered on 5/18/20at 08:07;  Start 

5/8/20 at 10:00;  Stop 5/18/20 at 17:15;  Status DC


Potassium Chloride 75 meq/ Magnesium Sulfate 15 meq/ Multivitamins 10 

ml/Chromium/ Copper/Manganese/ Seleni/Zn 0.5 ml/ Insulin Human Regular 15 unit/ 

Total Parenteral Nutrition/Amino Acids/Dextrose/ Fat Emulsion Intravenous 1,920 

ml @  80 mls/hr TPN  CONT IV  Last administered on 5/8/20at 21:59;  Start 5/8/20

at 22:00;  Stop 5/9/20 at 21:59;  Status DC


Metoclopramide HCl (Reglan Vial) 10 mg PRN Q3HRS  PRN IVP NAUSEA/VOMITING-3rd 

choice Last administered on 5/14/20at 04:25;  Start 5/9/20 at 16:45


Potassium Chloride 75 meq/ Magnesium Sulfate 15 meq/ Multivitamins 10 

ml/Chromium/ Copper/Manganese/ Seleni/Zn 0.5 ml/ Insulin Human Regular 15 unit/ 

Total Parenteral Nutrition/Amino Acids/Dextrose/ Fat Emulsion Intravenous 1,920 

ml @  80 mls/hr TPN  CONT IV  Last administered on 5/9/20at 22:41;  Start 5/9/20

at 22:00;  Stop 5/10/20 at 21:59;  Status DC


Magnesium Sulfate 50 ml @ 25 mls/hr 1X  ONCE IV  Last administered on 5/10/20at 

10:44;  Start 5/10/20 at 09:00;  Stop 5/10/20 at 10:59;  Status DC


Potassium Chloride/Water 100 ml @  100 mls/hr 1X  ONCE IV  Last administered on 

5/10/20at 09:37;  Start 5/10/20 at 09:00;  Stop 5/10/20 at 09:59;  Status DC


Duloxetine HCl (Cymbalta) 30 mg DAILY PO  Last administered on 5/11/20at 09:48; 

Start 5/10/20 at 14:00;  Stop 5/13/20 at 10:25;  Status DC


Potassium Chloride 80 meq/ Magnesium Sulfate 20 meq/ Multivitamins 10 

ml/Chromium/ Copper/Manganese/ Seleni/Zn 0.5 ml/ Insulin Human Regular 15 unit/ 

Total Parenteral Nutrition/Amino Acids/Dextrose/ Fat Emulsion Intravenous 1,920 

ml @  80 mls/hr TPN  CONT IV  Last administered on 5/10/20at 21:42;  Start 

5/10/20 at 22:00;  Stop 5/11/20 at 21:59;  Status DC


Potassium Chloride 80 meq/ Magnesium Sulfate 20 meq/ Multivitamins 10 

ml/Chromium/ Copper/Manganese/ Seleni/Zn 0.5 ml/ Insulin Human Regular 15 unit/ 

Total Parenteral Nutrition/Amino Acids/Dextrose/ Fat Emulsion Intravenous 1,920 

ml @  80 mls/hr TPN  CONT IV  Last administered on 5/11/20at 22:20;  Start 

5/11/20 at 22:00;  Stop 5/12/20 at 21:59;  Status DC


Lidocaine HCl (Buffered Lidocaine 1%) 3 ml STK-MED ONCE .ROUTE ;  Start 5/12/20 

at 09:54;  Stop 5/12/20 at 09:55;  Status DC


Hydromorphone HCl (Dilaudid Standard PCA) 12 mg STK-MED ONCE IV ;  Start 5/1/20 

at 15:50;  Stop 5/12/20 at 11:24;  Status DC


Potassium Chloride 80 meq/ Magnesium Sulfate 20 meq/ Multivitamins 10 

ml/Chromium/ Copper/Manganese/ Seleni/Zn 0.5 ml/ Insulin Human Regular 15 unit/ 

Total Parenteral Nutrition/Amino Acids/Dextrose/ Fat Emulsion Intravenous 1,920 

ml @  80 mls/hr TPN  CONT IV  Last administered on 5/12/20at 21:40;  Start 

5/12/20 at 22:00;  Stop 5/13/20 at 21:59;  Status DC


Lidocaine HCl (Buffered Lidocaine 1%) 6 ml 1X  ONCE INJ  Last administered on 

5/12/20at 14:15;  Start 5/12/20 at 14:15;  Stop 5/12/20 at 14:16;  Status DC


Potassium Chloride 80 meq/ Magnesium Sulfate 20 meq/ Multivitamins 10 

ml/Chromium/ Copper/Manganese/ Seleni/Zn 1 ml/ Insulin Human Regular 15 unit/ 

Total Parenteral Nutrition/Amino Acids/Dextrose/ Fat Emulsion Intravenous 1,920 

ml @  80 mls/hr TPN  CONT IV  Last administered on 5/13/20at 22:04;  Start 

5/13/20 at 22:00;  Stop 5/14/20 at 21:59;  Status DC


Potassium Chloride/Water 100 ml @  100 mls/hr 1X  ONCE IV  Last administered on 

5/14/20at 11:34;  Start 5/14/20 at 11:00;  Stop 5/14/20 at 11:59;  Status DC


Potassium Chloride 90 meq/ Magnesium Sulfate 20 meq/ Multivitamins 10 

ml/Chromium/ Copper/Manganese/ Seleni/Zn 1 ml/ Insulin Human Regular 15 unit/ 

Total Parenteral Nutrition/Amino Acids/Dextrose/ Fat Emulsion Intravenous 1,920 

ml @  80 mls/hr TPN  CONT IV  Last administered on 5/14/20at 22:57;  Start 

5/14/20 at 22:00;  Stop 5/15/20 at 21:59;  Status DC


Potassium Chloride 90 meq/ Magnesium Sulfate 20 meq/ Multivitamins 10 

ml/Chromium/ Copper/Manganese/ Seleni/Zn 1 ml/ Insulin Human Regular 15 unit/ 

Total Parenteral Nutrition/Amino Acids/Dextrose/ Fat Emulsion Intravenous 1,920 

ml @  80 mls/hr TPN  CONT IV  Last administered on 5/15/20at 22:48;  Start 

5/15/20 at 22:00;  Stop 5/16/20 at 21:59;  Status DC


Potassium Chloride 90 meq/ Magnesium Sulfate 20 meq/ Multivitamins 10 

ml/Chromium/ Copper/Manganese/ Seleni/Zn 1 ml/ Insulin Human Regular 15 unit/ 

Total Parenteral Nutrition/Amino Acids/Dextrose/ Fat Emulsion Intravenous 1,890 

ml @  78.75 mls/ hr TPN  CONT IV  Last administered on 5/16/20at 22:15;  Start 

5/16/20 at 22:00;  Stop 5/17/20 at 21:59;  Status DC


Linezolid/Dextrose 300 ml @  300 mls/hr Q12HR IV  Last administered on 5/19/20at

21:08;  Start 5/17/20 at 09:00;  Stop 5/20/20 at 08:11;  Status DC


Daptomycin 450 mg/ Sodium Chloride 50 ml @  100 mls/hr Q24H IV  Last 

administered on 5/20/20at 09:25;  Start 5/17/20 at 09:00;  Stop 5/21/20 at 

08:30;  Status DC


Potassium Chloride 90 meq/ Magnesium Sulfate 20 meq/ Multivitamins 10 ml/Chromi

um/ Copper/Manganese/ Seleni/Zn 1 ml/ Insulin Human Regular 15 unit/ Total 

Parenteral Nutrition/Amino Acids/Dextrose/ Fat Emulsion Intravenous 1,890 ml @  

78.75 mls/ hr TPN  CONT IV  Last administered on 5/17/20at 21:34;  Start 5/17/20

at 22:00;  Stop 5/18/20 at 21:59;  Status DC


Lorazepam (Ativan Inj) 2 mg STK-MED ONCE .ROUTE ;  Start 5/17/20 at 14:58;  Stop

5/17/20 at 14:58;  Status DC


Metoprolol Tartrate (Lopressor Vial) 5 mg 1X  ONCE IVP  Last administered on 

5/17/20at 15:31;  Start 5/17/20 at 15:15;  Stop 5/17/20 at 15:16;  Status DC


Lorazepam (Ativan Inj) 2 mg 1X  ONCE IVP  Last administered on 5/17/20at 15:30; 

Start 5/17/20 at 15:15;  Stop 5/17/20 at 15:16;  Status DC


Enoxaparin Sodium (Lovenox 40mg Syringe) 40 mg Q24H SQ  Last administered on 

6/5/20at 17:44;  Start 5/17/20 at 17:00;  Stop 6/7/20 at 06:50;  Status DC


Lorazepam (Ativan Inj) 1 mg PRN Q4HRS  PRN IVP ANXIETY / AGITATION MILD-MOD Last

administered on 5/31/20at 15:55;  Start 5/17/20 at 19:15;  Stop 6/2/20 at 11:45;

 Status DC


Lorazepam (Ativan Inj) 2 mg PRN Q4HRS  PRN IVP ANXIETY / AGITATION SEVERE Last 

administered on 6/1/20at 07:55;  Start 5/17/20 at 19:15;  Stop 6/2/20 at 11:45; 

Status DC


Fentanyl Citrate (Fentanyl 2ml Vial) 50 mcg PRN Q4HRS  PRN IVP SEVERE PAIN Last 

administered on 6/13/20at 05:15;  Start 5/18/20 at 13:15;  Stop 6/14/20 at 

09:29;  Status DC


Fentanyl Citrate (Fentanyl 2ml Vial) 25 mcg PRN Q4HRS  PRN IVP MODERATE PAIN 

Last administered on 6/13/20at 00:27;  Start 5/18/20 at 13:15;  Stop 6/14/20 at 

09:30;  Status DC


Potassium Chloride 90 meq/ Magnesium Sulfate 20 meq/ Multivitamins 10 

ml/Chromium/ Copper/Manganese/ Seleni/Zn 1 ml/ Insulin Human Regular 15 unit/ 

Total Parenteral Nutrition/Amino Acids/Dextrose/ Fat Emulsion Intravenous 1,890 

ml @  78.75 mls/ hr TPN  CONT IV  Last administered on 5/18/20at 22:18;  Start 

5/18/20 at 22:00;  Stop 5/19/20 at 21:59;  Status DC


Furosemide (Lasix) 40 mg 1X  ONCE IVP  Last administered on 5/18/20at 21:51;  

Start 5/18/20 at 21:45;  Stop 5/18/20 at 21:48;  Status DC


Albumin Human 100 ml @  100 mls/hr 1X PRN  PRN IV SEE COMMENTS;  Start 5/19/20 

at 01:30


Furosemide (Lasix) 40 mg [id number] IVP  Last administered on 6/3/20at 08:04;  Start 

5/19/20 at 14:00;  Stop 6/3/20 at 13:07;  Status DC


Potassium Chloride 90 meq/ Magnesium Sulfate 20 meq/ Multivitamins 10 

ml/Chromium/ Copper/Manganese/ Seleni/Zn 1 ml/ Insulin Human Regular 15 unit/ 

Total Parenteral Nutrition/Amino Acids/Dextrose/ Fat Emulsion Intravenous 1,800 

ml @  75 mls/hr TPN  CONT IV  Last administered on 5/19/20at 22:31;  Start 

5/19/20 at 22:00;  Stop 5/20/20 at 21:59;  Status DC


Potassium Chloride 90 meq/ Magnesium Sulfate 20 meq/ Multivitamins 10 

ml/Chromium/ Copper/Manganese/ Seleni/Zn 1 ml/ Insulin Human Regular 15 unit/ 

Total Parenteral Nutrition/Amino Acids/Dextrose/ Fat Emulsion Intravenous 1,800 

ml @  75 mls/hr TPN  CONT IV  Last administered on 5/20/20at 22:28;  Start 

5/20/20 at 22:00;  Stop 5/21/20 at 21:59;  Status DC


Potassium Chloride 110 meq/ Magnesium Sulfate 20 meq/ Multivitamins 10 

ml/Chromium/ Copper/Manganese/ Seleni/Zn 1 ml/ Insulin Human Regular 15 unit/ 

Total Parenteral Nutrition/Amino Acids/Dextrose/ Fat Emulsion Intravenous 1,800 

ml @  75 mls/hr TPN  CONT IV  Last administered on 5/21/20at 22:01;  Start 

5/21/20 at 22:00;  Stop 5/22/20 at 21:59;  Status DC


Saliva Substitute (Biotene Moisturizing Mouth) 2 spray PRN Q15MIN  PRN PO DRY 

MOUTH;  Start 5/21/20 at 11:00


Potassium Chloride 110 meq/ Magnesium Sulfate 20 meq/ Multivitamins 10 

ml/Chromium/ Copper/Manganese/ Seleni/Zn 1 ml/ Insulin Human Regular 15 unit/ 

Total Parenteral Nutrition/Amino Acids/Dextrose/ Fat Emulsion Intravenous 1,800 

ml @  75 mls/hr TPN  CONT IV  Last administered on 5/22/20at 22:21;  Start 

5/22/20 at 22:00;  Stop 5/23/20 at 21:59;  Status DC


Potassium Chloride 110 meq/ Magnesium Sulfate 20 meq/ Multivitamins 10 

ml/Chromium/ Copper/Manganese/ Seleni/Zn 1 ml/ Insulin Human Regular 15 unit/ 

Total Parenteral Nutrition/Amino Acids/Dextrose/ Fat Emulsion Intravenous 1,800 

ml @  75 mls/hr TPN  CONT IV  Last administered on 5/23/20at 22:04;  Start 

5/23/20 at 22:00;  Stop 5/24/20 at 21:59;  Status DC


Potassium Chloride 110 meq/ Magnesium Sulfate 20 meq/ Multivitamins 10 

ml/Chromium/ Copper/Manganese/ Seleni/Zn 1 ml/ Insulin Human Regular 15 unit/ 

Total Parenteral Nutrition/Amino Acids/Dextrose/ Fat Emulsion Intravenous 1,800 

ml @  75 mls/hr TPN  CONT IV  Last administered on 5/24/20at 22:48;  Start 

5/24/20 at 22:00;  Stop 5/25/20 at 21:59;  Status DC


Potassium Chloride 70 meq/ Magnesium Sulfate 20 meq/ Multivitamins 10 

ml/Chromium/ Copper/Manganese/ Seleni/Zn 1 ml/ Insulin Human Regular 15 unit/ 

Total Parenteral Nutrition/Amino Acids/Dextrose/ Fat Emulsion Intravenous 1,800 

ml @  75 mls/hr TPN  CONT IV  Last administered on 5/25/20at 21:39;  Start 

5/25/20 at 22:00;  Stop 5/26/20 at 21:59;  Status DC


Meropenem 500 mg/ Sodium Chloride 50 ml @  100 mls/hr Q6HRS IV  Last 

administered on 5/27/20at 06:02;  Start 5/25/20 at 18:00;  Stop 5/27/20 at 

09:59;  Status DC


Barium Sulfate (Varibar Thin Liquid Apple) 148 gm 1X  ONCE PO ;  Start 5/26/20 

at 11:45;  Stop 5/26/20 at 11:49;  Status DC


Potassium Chloride 70 meq/ Magnesium Sulfate 20 meq/ Multivitamins 10 

ml/Chromium/ Copper/Manganese/ Seleni/Zn 1 ml/ Insulin Human Regular 15 unit/ 

Total Parenteral Nutrition/Amino Acids/Dextrose/ Fat Emulsion Intravenous 1,800 

ml @  75 mls/hr TPN  CONT IV  Last administered on 5/26/20at 22:27;  Start 

5/26/20 at 22:00;  Stop 5/27/20 at 21:59;  Status DC


Piperacillin Sod/ Tazobactam Sod 3.375 gm/Sodium Chloride 50 ml @  100 mls/hr 

Q6HRS IV  Last administered on 6/4/20at 06:10;  Start 5/27/20 at 12:00;  Stop 

6/4/20 at 07:26;  Status DC


Potassium Chloride 70 meq/ Magnesium Sulfate 20 meq/ Multivitamins 10 

ml/Chromium/ Copper/Manganese/ Seleni/Zn 1 ml/ Insulin Human Regular 15 unit/ 

Total Parenteral Nutrition/Amino Acids/Dextrose/ Fat Emulsion Intravenous 1,800 

ml @  75 mls/hr TPN  CONT IV  Last administered on 5/27/20at 22:03;  Start 

5/27/20 at 22:00;  Stop 5/28/20 at 21:59;  Status DC


Potassium Chloride 70 meq/ Magnesium Sulfate 20 meq/ Multivitamins 10 

ml/Chromium/ Copper/Manganese/ Seleni/Zn 1 ml/ Insulin Human Regular 15 unit/ 

Total Parenteral Nutrition/Amino Acids/Dextrose/ Fat Emulsion Intravenous 1,800 

ml @  75 mls/hr TPN  CONT IV  Last administered on 5/28/20at 22:33;  Start 

5/28/20 at 22:00;  Stop 5/29/20 at 21:59;  Status DC


Potassium Chloride 70 meq/ Magnesium Sulfate 20 meq/ Multivitamins 10 

ml/Chromium/ Copper/Manganese/ Seleni/Zn 1 ml/ Insulin Human Regular 15 unit/ 

Total Parenteral Nutrition/Amino Acids/Dextrose/ Fat Emulsion Intravenous 1,800 

ml @  75 mls/hr TPN  CONT IV  Last administered on 5/29/20at 23:13;  Start 

5/29/20 at 22:00;  Stop 5/30/20 at 21:59;  Status DC


Potassium Chloride 80 meq/ Magnesium Sulfate 20 meq/ Multivitamins 10 

ml/Chromium/ Copper/Manganese/ Seleni/Zn 1 ml/ Insulin Human Regular 15 unit/ 

Total Parenteral Nutrition/Amino Acids/Dextrose/ Fat Emulsion Intravenous 1,800 

ml @  75 mls/hr TPN  CONT IV  Last administered on 5/30/20at 22:30;  Start 

5/30/20 at 22:00;  Stop 5/31/20 at 21:59;  Status DC


Potassium Chloride 80 meq/ Magnesium Sulfate 20 meq/ Multivitamins 10 

ml/Chromium/ Copper/Manganese/ Seleni/Zn 1 ml/ Insulin Human Regular 15 unit/ 

Total Parenteral Nutrition/Amino Acids/Dextrose/ Fat Emulsion Intravenous 1,800 

ml @  75 mls/hr TPN  CONT IV  Last administered on 5/31/20at 21:54;  Start 

5/31/20 at 22:00;  Stop 6/1/20 at 21:59;  Status DC


Potassium Chloride/Water 100 ml @  100 mls/hr 1X  ONCE IV  Last administered on 

6/1/20at 10:15;  Start 6/1/20 at 10:00;  Stop 6/1/20 at 10:59;  Status DC


Potassium Chloride 90 meq/ Magnesium Sulfate 20 meq/ Multivitamins 10 m

l/Chromium/ Copper/Manganese/ Seleni/Zn 1 ml/ Insulin Human Regular 20 unit/ 

Total Parenteral Nutrition/Amino Acids/Dextrose/ Fat Emulsion Intravenous 1,800 

ml @  75 mls/hr TPN  CONT IV  Last administered on 6/1/20at 22:28;  Start 6/1/20

at 22:00;  Stop 6/2/20 at 21:59;  Status DC


Potassium Chloride 90 meq/ Magnesium Sulfate 20 meq/ Multivitamins 10 

ml/Chromium/ Copper/Manganese/ Seleni/Zn 1 ml/ Insulin Human Regular 20 unit/ 

Total Parenteral Nutrition/Amino Acids/Dextrose/ Fat Emulsion Intravenous 1,800 

ml @  75 mls/hr TPN  CONT IV  Last administered on 6/2/20at 22:08;  Start 6/2/20

at 22:00;  Stop 6/3/20 at 21:59;  Status DC


Lorazepam (Ativan Inj) 0.25 mg PRN Q4HRS  PRN IVP ANXIETY / AGITATION Last 

administered on 6/27/20at 13:37;  Start 6/3/20 at 07:30


Potassium Chloride 90 meq/ Magnesium Sulfate 20 meq/ Multivitamins 10 

ml/Chromium/ Copper/Manganese/ Seleni/Zn 1 ml/ Insulin Human Regular 20 unit/ 

Total Parenteral Nutrition/Amino Acids/Dextrose/ Fat Emulsion Intravenous 1,800 

ml @  75 mls/hr TPN  CONT IV  Last administered on 6/3/20at 23:13;  Start 6/3/20

at 22:00;  Stop 6/4/20 at 21:59;  Status DC


Furosemide (Lasix) 40 mg DAILY IVP  Last administered on 6/5/20at 11:14;  Start 

6/3/20 at 13:30;  Stop 6/7/20 at 09:12;  Status DC


Fluoxetine HCl (PROzac) 20 mg QHS PEG  Last administered on 7/5/20at 21:14;  

Start 6/4/20 at 21:00


Fentanyl (Duragesic 50mcg/ Hr Patch) 1 patch Q72H TD  Last administered on 

6/4/20at 21:22;  Start 6/4/20 at 21:00;  Stop 6/13/20 at 12:00;  Status DC


Potassium Chloride 40 meq/ Potassium Acetate 60 meq/Magnesium Sulfate 10 meq/ 

Multivitamins 10 ml/Chromium/ Copper/Manganese/ Seleni/Zn 1 ml/ Insulin Human 

Regular 20 unit/ Total Parenteral Nutrition/Amino Acids/Dextrose/ Fat Emulsion 

Intravenous 1,800 ml @  75 mls/hr TPN  CONT IV  Last administered on 6/5/20at 

00:03;  Start 6/4/20 at 22:00;  Stop 6/5/20 at 21:59;  Status DC


Potassium Acetate 80 meq/Magnesium Sulfate 5 meq/ Multivitamins 10 ml/Chromium/ 

Copper/Manganese/ Seleni/Zn 1 ml/ Insulin Human Regular 20 unit/ Total 

Parenteral Nutrition/Amino Acids/Dextrose/ Fat Emulsion Intravenous 1,920 ml @  

80 mls/hr TPN  CONT IV  Last administered on 6/5/20at 21:59;  Start 6/5/20 at 

22:00;  Stop 6/6/20 at 21:59;  Status DC


Potassium Acetate 60 meq/Magnesium Sulfate 5 meq/ Multivitamins 10 ml/Chromium/ 

Copper/Manganese/ Seleni/Zn 1 ml/ Insulin Human Regular 30 unit/ Total 

Parenteral Nutrition/Amino Acids/Dextrose/ Fat Emulsion Intravenous 1,920 ml @  

80 mls/hr TPN  CONT IV  Last administered on 6/6/20at 21:54;  Start 6/6/20 at 

22:00;  Stop 6/7/20 at 21:59;  Status DC


Norepinephrine Bitartrate 8 mg/ Dextrose 258 ml @  13.332 mls/ hr CONT  PRN IV 

PER PROTOCOL Last administered on 7/2/20at 09:09;  Start 6/7/20 at 06:30


Albumin Human 500 ml @  125 mls/hr 1X  ONCE IV  Last administered on 6/7/20at 

08:10;  Start 6/7/20 at 08:15;  Stop 6/7/20 at 12:14;  Status DC


Potassium Acetate 40 meq/Magnesium Sulfate 5 meq/ Multivitamins 10 ml/Chromium/ 

Copper/Manganese/ Seleni/Zn 1 ml/ Insulin Human Regular 30 unit/ Total 

Parenteral Nutrition/Amino Acids/Dextrose/ Fat Emulsion Intravenous 1,920 ml @  

80 mls/hr TPN  CONT IV  Last administered on 6/7/20at 22:23;  Start 6/7/20 at 

22:00;  Stop 6/8/20 at 21:59;  Status DC


Meropenem 1 gm/ Sodium Chloride 100 ml @  200 mls/hr Q8HRS IV ;  Start 6/7/20 at

14:00;  Status Cancel


Meropenem 1 gm/ Sodium Chloride 100 ml @  200 mls/hr Q8HRS IV  Last administered

on 6/7/20at 11:04;  Start 6/7/20 at 10:00;  Stop 6/7/20 at 13:00;  Status DC


Meropenem 1 gm/ Sodium Chloride 100 ml @  200 mls/hr Q12HR IV  Last administered

on 6/25/20at 08:27;  Start 6/7/20 at 21:00;  Stop 6/25/20 at 08:56;  Status DC


Sodium Chloride 1,000 ml @  1,000 mls/hr 1X  ONCE IV  Last administered on 

6/7/20at 11:06;  Start 6/7/20 at 10:45;  Stop 6/7/20 at 11:44;  Status DC


Micafungin Sodium 100 mg/Dextrose 100 ml @  100 mls/hr Q24H IV  Last 

administered on 6/24/20at 12:34;  Start 6/7/20 at 11:00;  Stop 6/25/20 at 08:56;

 Status DC


Daptomycin 410 mg/ Sodium Chloride 50 ml @  100 mls/hr Q24H IV  Last 

administered on 6/9/20at 13:33;  Start 6/7/20 at 14:00;  Stop 6/10/20 at 08:30; 

Status DC


Midazolam HCl (Versed) 2 mg STK-MED ONCE .ROUTE ;  Start 6/7/20 at 14:47;  Stop 

6/7/20 at 14:48;  Status DC


Fentanyl Citrate (Fentanyl 2ml Vial) 100 mcg STK-MED ONCE .ROUTE ;  Start 6/7/20

at 14:47;  Stop 6/7/20 at 14:48;  Status DC


Flumazenil (Romazicon) 0.5 mg STK-MED ONCE IV ;  Start 6/7/20 at 14:48;  Stop 

6/7/20 at 14:48;  Status DC


Naloxone HCl (Narcan) 0.4 mg STK-MED ONCE .ROUTE ;  Start 6/7/20 at 14:48;  Stop

6/7/20 at 14:48;  Status DC


Lidocaine HCl (Lidocaine 1% 20ml Vial) 20 ml STK-MED ONCE .ROUTE ;  Start 6/7/20

at 14:48;  Stop 6/7/20 at 14:48;  Status DC


Midazolam HCl (Versed) 2 mg 1X  ONCE IV  Last administered on 6/7/20at 15:28;  

Start 6/7/20 at 15:00;  Stop 6/7/20 at 15:01;  Status DC


Fentanyl Citrate (Fentanyl 2ml Vial) 100 mcg 1X  ONCE IV  Last administered on 

6/7/20at 15:28;  Start 6/7/20 at 15:00;  Stop 6/7/20 at 15:01;  Status DC


Lidocaine HCl (Lidocaine 1% 20ml Vial) 20 ml 1X  ONCE INJ  Last administered on 

6/7/20at 15:30;  Start 6/7/20 at 15:00;  Stop 6/7/20 at 15:01;  Status DC


Sodium Chloride 1,000 ml @  100 mls/hr Q10H IV  Last administered on 6/16/20at 

07:30;  Start 6/7/20 at 20:00;  Stop 6/16/20 at 11:26;  Status DC


Sodium Bicarbonate (Sodium Bicarb Adult 8.4% Syr) 50 meq 1X  ONCE IV  Last 

administered on 6/7/20at 21:47;  Start 6/7/20 at 22:00;  Stop 6/7/20 at 22:01;  

Status DC


Potassium Acetate 40 meq/Magnesium Sulfate 5 meq/ Multivitamins 10 ml/Chromium/ 

Copper/Manganese/ Seleni/Zn 1 ml/ Insulin Human Regular 30 unit/ Total 

Parenteral Nutrition/Amino Acids/Dextrose/ Fat Emulsion Intravenous 1,920 ml @  

80 mls/hr TPN  CONT IV  Last administered on 6/8/20at 22:28;  Start 6/8/20 at 

22:00;  Stop 6/9/20 at 21:59;  Status DC


Sodium Chloride 500 ml @  500 mls/hr 1X  ONCE IV  Last administered on 6/9/20at 

06:39;  Start 6/9/20 at 06:45;  Stop 6/9/20 at 07:44;  Status DC


Potassium Acetate 40 meq/Magnesium Sulfate 5 meq/ Multivitamins 10 ml/Chromium/ 

Copper/Manganese/ Seleni/Zn 1 ml/ Insulin Human Regular 30 unit/ Total 

Parenteral Nutrition/Amino Acids/Dextrose/ Fat Emulsion Intravenous 1,920 ml @  

80 mls/hr TPN  CONT IV  Last administered on 6/9/20at 22:03;  Start 6/9/20 at 

22:00;  Stop 6/10/20 at 21:59;  Status DC


Metoprolol Tartrate (Lopressor Vial) 5 mg PRN Q6HRS  PRN IVP HYPERTENSION Last 

administered on 6/18/20at 10:14;  Start 6/10/20 at 09:00


Potassium Acetate 40 meq/Magnesium Sulfate 5 meq/ Multivitamins 10 ml/Chromium/ 

Copper/Manganese/ Seleni/Zn 1 ml/ Insulin Human Regular 30 unit/ Total 

Parenteral Nutrition/Amino Acids/Dextrose/ Fat Emulsion Intravenous 1,920 ml @  

80 mls/hr TPN  CONT IV  Last administered on 6/10/20at 21:26;  Start 6/10/20 at 

22:00;  Stop 6/11/20 at 21:59;  Status DC


Potassium Acetate 40 meq/Magnesium Sulfate 5 meq/ Multivitamins 10 ml/Chromium/ 

Copper/Manganese/ Seleni/Zn 1 ml/ Insulin Human Regular 30 unit/ Total 

Parenteral Nutrition/Amino Acids/Dextrose/ Fat Emulsion Intravenous 1,920 ml @  

80 mls/hr TPN  CONT IV  Last administered on 6/11/20at 23:23;  Start 6/11/20 at 

22:00;  Stop 6/12/20 at 21:59;  Status DC


Potassium Acetate 40 meq/Magnesium Sulfate 5 meq/ Multivitamins 10 ml/Chromium/ 

Copper/Manganese/ Seleni/Zn 1 ml/ Insulin Human Regular 30 unit/ Total 

Parenteral Nutrition/Amino Acids/Dextrose/ Fat Emulsion Intravenous 1,920 ml @  

80 mls/hr TPN  CONT IV  Last administered on 6/12/20at 21:35;  Start 6/12/20 at 

22:00;  Stop 6/13/20 at 21:59;  Status DC


Furosemide (Lasix) 20 mg 1X  ONCE IVP  Last administered on 6/13/20at 06:26;  

Start 6/13/20 at 06:15;  Stop 6/13/20 at 06:16;  Status DC


Methylprednisolone Sodium Succinate (SOLU-Medrol 125MG VIAL) 125 mg 1X  ONCE IV 

Last administered on 6/13/20at 06:26;  Start 6/13/20 at 06:15;  Stop 6/13/20 at 

06:16;  Status DC


Albuterol/ Ipratropium (Duoneb) 3 ml Q4HRS NEB  Last administered on 7/6/20at 

08:00;  Start 6/13/20 at 08:00


Fentanyl Citrate 30 ml @ 0 mls/hr CONT  PRN IV SEE PROTOCOL Last administered on

7/4/20at 08:03;  Start 6/13/20 at 06:00;  Stop 7/4/20 at 12:42;  Status DC


Propofol 100 ml @ 0 mls/hr CONT  PRN IV SEE PROTOCOL Last administered on 

6/20/20at 23:50;  Start 6/13/20 at 06:00


Fentanyl Citrate (Fentanyl 2ml Vial) 25 mcg PRN Q1HR  PRN IV SEE COMMENTS;  

Start 6/13/20 at 06:00


Fentanyl Citrate (Fentanyl 2ml Vial) 50 mcg PRN Q1HR  PRN IV SEE COMMENTS;  

Start 6/13/20 at 06:00


Chlorhexidine Gluconate (Peridex) 15 ml BID MM ;  Start 6/13/20 at 09:00;  Stop 

6/13/20 at 07:58;  Status DC


Potassium Acetate 40 meq/Magnesium Sulfate 5 meq/ Multivitamins 10 ml/Chromium/ 

Copper/Manganese/ Seleni/Zn 1 ml/ Insulin Human Regular 30 unit/ Total 

Parenteral Nutrition/Amino Acids/Dextrose/ Fat Emulsion Intravenous 1,920 ml @  

80 mls/hr TPN  CONT IV  Last administered on 6/13/20at 21:19;  Start 6/13/20 at 

22:00;  Stop 6/14/20 at 21:59;  Status DC


Acetylcysteine (Mucomyst 20% Resp Treatment) 600 mg BID NEB  Last administered 

on 6/19/20at 09:33;  Start 6/13/20 at 21:00;  Stop 6/19/20 at 10:39;  Status DC


Magnesium Sulfate 100 ml @  25 mls/hr 1X  ONCE IV  Last administered on 

6/13/20at 15:48;  Start 6/13/20 at 15:45;  Stop 6/13/20 at 19:44;  Status DC


Potassium Acetate 40 meq/Magnesium Sulfate 5 meq/ Multivitamins 10 ml/Chromium/ 

Copper/Manganese/ Seleni/Zn 1 ml/ Insulin Human Regular 30 unit/ Total 

Parenteral Nutrition/Amino Acids/Dextrose/ Fat Emulsion Intravenous 1,920 ml @  

80 mls/hr TPN  CONT IV  Last administered on 6/14/20at 21:35;  Start 6/14/20 at 

22:00;  Stop 6/15/20 at 21:59;  Status DC


Potassium Chloride/Water 100 ml @  100 mls/hr Q1H IV  Last administered on 

6/15/20at 08:31;  Start 6/15/20 at 07:00;  Stop 6/15/20 at 08:59;  Status DC


Potassium Acetate 40 meq/Magnesium Sulfate 5 meq/ Multivitamins 10 ml/Chromium/ 

Copper/Manganese/ Seleni/Zn 1 ml/ Insulin Human Regular 30 unit/ Total 

Parenteral Nutrition/Amino Acids/Dextrose/ Fat Emulsion Intravenous 1,920 ml @  

80 mls/hr TPN  CONT IV  Last administered on 6/15/20at 21:54;  Start 6/15/20 at 

22:00;  Stop 6/16/20 at 19:34;  Status DC


Lidocaine HCl (Buffered Lidocaine 1%) 3 ml STK-MED ONCE .ROUTE ;  Start 6/15/20 

at 12:14;  Stop 6/15/20 at 12:14;  Status DC


Lidocaine HCl (Buffered Lidocaine 1%) 3 ml 1X  ONCE IJ  Last administered on 

6/15/20at 13:11;  Start 6/15/20 at 13:00;  Stop 6/15/20 at 13:01;  Status DC


Magnesium Sulfate 50 ml @ 25 mls/hr 1X  ONCE IV ;  Start 6/16/20 at 08:15;  Stop

6/16/20 at 10:14;  Status DC


Potassium Acetate 40 meq/Magnesium Sulfate 10 meq/ Multivitamins 10 ml/Chromium/

Copper/Manganese/ Seleni/Zn 1 ml/ Insulin Human Regular 20 unit/ Total 

Parenteral Nutrition/Amino Acids/Dextrose/ Fat Emulsion Intravenous 1,920 ml @  

80 mls/hr TPN  CONT IV  Last administered on 6/16/20at 21:32;  Start 6/16/20 at 

22:00;  Stop 6/17/20 at 21:59;  Status DC


Potassium Chloride/Water 100 ml @  100 mls/hr Q1H IV  Last administered on 

6/17/20at 09:12;  Start 6/17/20 at 08:00;  Stop 6/17/20 at 09:59;  Status DC


Alteplase, Recombinant (Cathflo For Central Catheter Clearance) 4 mg 1X  ONCE 

INT CAT ;  Start 6/17/20 at 09:15;  Stop 6/17/20 at 09:16;  Status UNV


Alteplase, Recombinant (Cathflo For Central Catheter Clearance) 4 mg 1X  ONCE 

INT CAT ;  Start 6/17/20 at 09:15;  Stop 6/17/20 at 09:16;  Status UNV


Alteplase, Recombinant (Cathflo For Central Catheter Clearance) 4 mg 1X  ONCE 

INT CAT ;  Start 6/17/20 at 09:15;  Stop 6/17/20 at 09:16;  Status UNV


Alteplase, Recombinant 4 mg/ Sodium Chloride 20 ml @ 20 mls/hr 1X  ONCE IV  Last

administered on 6/17/20at 10:10;  Start 6/17/20 at 10:00;  Stop 6/17/20 at 

10:59;  Status DC


Alteplase, Recombinant 4 mg/ Sodium Chloride 20 ml @ 20 mls/hr 1X  ONCE IV  Last

administered on 6/17/20at 10:09;  Start 6/17/20 at 10:00;  Stop 6/17/20 at 

10:59;  Status DC


Alteplase, Recombinant 4 mg/ Sodium Chloride 20 ml @ 20 mls/hr 1X  ONCE IV  Last

administered on 6/17/20at 10:09;  Start 6/17/20 at 10:00;  Stop 6/17/20 at 

10:59;  Status DC


Potassium Acetate 60 meq/Magnesium Sulfate 10 meq/ Multivitamins 10 ml/Chromium/

Copper/Manganese/ Seleni/Zn 1 ml/ Insulin Human Regular 20 unit/ Total 

Parenteral Nutrition/Amino Acids/Dextrose/ Fat Emulsion Intravenous 1,920 ml @  

80 mls/hr TPN  CONT IV  Last administered on 6/17/20at 21:55;  Start 6/17/20 at 

22:00;  Stop 6/18/20 at 21:59;  Status DC


Albumin Human 500 ml @  125 mls/hr 1X  ONCE IV  Last administered on 6/18/20at 

12:01;  Start 6/18/20 at 11:15;  Stop 6/18/20 at 15:14;  Status DC


Sodium Chloride 500 ml @  500 mls/hr 1X  ONCE IV  Last administered on 6/18/20at

13:50;  Start 6/18/20 at 11:15;  Stop 6/18/20 at 12:14;  Status DC


Potassium Acetate 60 meq/Magnesium Sulfate 14 meq/ Multivitamins 10 ml/Chromium/

Copper/Manganese/ Seleni/Zn 1 ml/ Insulin Human Regular 20 unit/ Total Parent

eral Nutrition/Amino Acids/Dextrose/ Fat Emulsion Intravenous 1,920 ml @  80 

mls/hr TPN  CONT IV  Last administered on 6/18/20at 22:26;  Start 6/18/20 at 

22:00;  Stop 6/19/20 at 21:59;  Status DC


Ciprofloxacin/ Dextrose 200 ml @  200 mls/hr Q12HR IV  Last administered on 

6/25/20at 08:27;  Start 6/18/20 at 21:00;  Stop 6/25/20 at 08:56;  Status DC


Albumin Human 250 ml @  62.5 mls/hr 1X  ONCE IV  Last administered on 6/19/20at 

11:09;  Start 6/19/20 at 11:00;  Stop 6/19/20 at 14:59;  Status DC


Furosemide (Lasix) 20 mg 1X  ONCE IVP  Last administered on 6/19/20at 14:52;  

Start 6/19/20 at 10:45;  Stop 6/19/20 at 10:49;  Status DC


Potassium Acetate 60 meq/Magnesium Sulfate 14 meq/ Multivitamins 10 ml/Chromium/

Copper/Manganese/ Seleni/Zn 1 ml/ Insulin Human Regular 15 unit/ Total 

Parenteral Nutrition/Amino Acids/Dextrose/ Fat Emulsion Intravenous 1,920 ml @  

80 mls/hr TPN  CONT IV  Last administered on 6/19/20at 22:08;  Start 6/19/20 at 

22:00;  Stop 6/20/20 at 21:59;  Status DC


Potassium Acetate 60 meq/Magnesium Sulfate 14 meq/ Multivitamins 10 ml/Chromium/

Copper/Manganese/ Seleni/Zn 1 ml/ Insulin Human Regular 15 unit/ Total 

Parenteral Nutrition/Amino Acids/Dextrose/ Fat Emulsion Intravenous 1,920 ml @  

80 mls/hr TPN  CONT IV  Last administered on 6/20/20at 22:12;  Start 6/20/20 at 

22:00;  Stop 6/21/20 at 21:59;  Status DC


Potassium Acetate 60 meq/Magnesium Sulfate 14 meq/ Multivitamins 10 ml/Chromium/

Copper/Manganese/ Seleni/Zn 1 ml/ Insulin Human Regular 15 unit/ Total 

Parenteral Nutrition/Amino Acids/Dextrose/ Fat Emulsion Intravenous 1,920 ml @  

80 mls/hr TPN  CONT IV  Last administered on 6/21/20at 22:22;  Start 6/21/20 at 

22:00;  Stop 6/22/20 at 21:59;  Status DC


Furosemide (Lasix) 20 mg 1X  ONCE IVP  Last administered on 6/22/20at 11:07;  

Start 6/22/20 at 10:30;  Stop 6/22/20 at 10:34;  Status DC


Potassium Acetate 60 meq/Magnesium Sulfate 14 meq/ Multivitamins 10 ml/Chromium/

Copper/Manganese/ Seleni/Zn 1 ml/ Insulin Human Regular 15 unit/ Sodium Chloride

20 meq/Total Parenteral Nutrition/Amino Acids/Dextrose/ Fat Emulsion Intravenous

1,920 ml @  80 mls/hr TPN  CONT IV  Last administered on 6/22/20at 21:54;  Start

6/22/20 at 22:00;  Stop 6/23/20 at 21:59;  Status DC


Potassium Acetate 30 meq/Magnesium Sulfate 14 meq/ Multivitamins 10 ml/Chromium/

Copper/Manganese/ Seleni/Zn 1 ml/ Insulin Human Regular 15 unit/ Sodium Chloride

20 meq/Potassium Chloride 30 meq/ Total Parenteral Nutrition/Amino 

Acids/Dextrose/ Fat Emulsion Intravenous 1,920 ml @  80 mls/hr TPN  CONT IV  

Last administered on 6/23/20at 21:46;  Start 6/23/20 at 22:00;  Stop 6/24/20 at 

21:59;  Status DC


Sodium Chloride 80 meq/Potassium Chloride 30 meq/ Potassium Acetate 30 meq/

Magnesium Sulfate 14 meq/ Multivitamins 10 ml/Chromium/ Copper/Manganese/ 

Seleni/Zn 1 ml/ Insulin Human Regular 15 unit/ Total Parenteral Nutrition/Amino 

Acids/Dextrose/ Fat Emulsion Intravenous 1,920 ml @  80 mls/hr TPN  CONT IV  

Last administered on 6/24/20at 22:33;  Start 6/24/20 at 22:00;  Stop 6/25/20 at 

21:59;  Status DC


Furosemide (Lasix) 40 mg 1X  ONCE IVP  Last administered on 6/24/20at 16:27;  

Start 6/24/20 at 15:30;  Stop 6/24/20 at 15:33;  Status DC


Albumin Human 250 ml @  62.5 mls/hr 1X  ONCE IV  Last administered on 6/24/20at 

16:27;  Start 6/24/20 at 15:30;  Stop 6/24/20 at 19:29;  Status DC


Sodium Chloride 80 meq/Potassium Chloride 30 meq/ Potassium Acetate 30 

meq/Magnesium Sulfate 14 meq/ Multivitamins 10 ml/Chromium/ Copper/Manganese/ 

Seleni/Zn 1 ml/ Insulin Human Regular 15 unit/ Total Parenteral Nutrition/Amino 

Acids/Dextrose/ Fat Emulsion Intravenous 1,920 ml @  80 mls/hr TPN  CONT IV  

Last administered on 6/25/20at 22:25;  Start 6/25/20 at 22:00;  Stop 6/26/20 at 

21:59;  Status DC


Sodium Chloride 80 meq/Potassium Chloride 30 meq/ Potassium Acetate 30 

meq/Magnesium Sulfate 14 meq/ Multivitamins 10 ml/Chromium/ Copper/Manganese/ 

Seleni/Zn 1 ml/ Insulin Human Regular 15 unit/ Total Parenteral Nutrition/Amino 

Acids/Dextrose/ Fat Emulsion Intravenous 1,920 ml @  80 mls/hr TPN  CONT IV  

Last administered on 6/26/20at 21:32;  Start 6/26/20 at 22:00;  Stop 6/27/20 at 

21:59;  Status DC


Sodium Chloride 80 meq/Potassium Chloride 30 meq/ Potassium Acetate 30 

meq/Magnesium Sulfate 14 meq/ Multivitamins 10 ml/Chromium/ Copper/Manganese/ 

Seleni/Zn 1 ml/ Insulin Human Regular 15 unit/ Total Parenteral Nutrition/Amino 

Acids/Dextrose/ Fat Emulsion Intravenous 1,920 ml @  80 mls/hr TPN  CONT IV  

Last administered on 6/27/20at 21:53;  Start 6/27/20 at 22:00;  Stop 6/28/20 at 

21:59;  Status DC


Acetylcysteine (Mucomyst 20% Resp Treatment) 600 mg RTBID NEB  Last administered

on 7/6/20at 08:00;  Start 6/27/20 at 12:00


Sodium Chloride 80 meq/Potassium Chloride 30 meq/ Potassium Acetate 30 

meq/Magnesium Sulfate 14 meq/ Multivitamins 10 ml/Chromium/ Copper/Manganese/ 

Seleni/Zn 1 ml/ Insulin Human Regular 15 unit/ Total Parenteral Nutrition/Amino 

Acids/Dextrose/ Fat Emulsion Intravenous 1,920 ml @  80 mls/hr TPN  CONT IV  

Last administered on 6/28/20at 22:06;  Start 6/28/20 at 22:00;  Stop 6/29/20 at 

21:59;  Status DC


Meropenem 500 mg/ Sodium Chloride 50 ml @  100 mls/hr Q6HRS IV  Last administer

ed on 7/6/20at 06:23;  Start 6/28/20 at 18:00


Daptomycin 500 mg/ Sodium Chloride 50 ml @  100 mls/hr Q24H IV  Last 

administered on 7/5/20at 19:21;  Start 6/28/20 at 19:00


Sodium Chloride 80 meq/Potassium Chloride 30 meq/ Potassium Acetate 30 

meq/Magnesium Sulfate 14 meq/ Multivitamins 10 ml/Chromium/ Copper/Manganese/ 

Seleni/Zn 1 ml/ Insulin Human Regular 15 unit/ Total Parenteral Nutrition/Amino 

Acids/Dextrose/ Fat Emulsion Intravenous 1,920 ml @  80 mls/hr TPN  CONT IV  

Last administered on 6/29/20at 22:09;  Start 6/29/20 at 22:00;  Stop 6/30/20 at 

21:59;  Status DC


Heparin Sodium (Porcine) 1000 unit/Sodium Chloride 1,001 ml @  1,001 mls/hr 1X  

ONCE IRR ;  Start 6/30/20 at 06:00;  Stop 6/30/20 at 06:59;  Status DC


Propofol (Diprivan) 200 mg STK-MED ONCE IV ;  Start 6/30/20 at 07:44;  Stop 

6/30/20 at 07:44;  Status DC


Lidocaine HCl (Lidocaine Pf 2% Vial) 5 ml STK-MED ONCE .ROUTE ;  Start 6/30/20 

at 07:44;  Stop 6/30/20 at 07:44;  Status DC


Fentanyl Citrate (Fentanyl 2ml Vial) 100 mcg STK-MED ONCE .ROUTE ;  Start 

6/30/20 at 07:44;  Stop 6/30/20 at 07:44;  Status DC


Rocuronium Bromide (Zemuron) 100 mg STK-MED ONCE .ROUTE ;  Start 6/30/20 at 

07:44;  Stop 6/30/20 at 07:44;  Status DC


Micafungin Sodium 100 mg/Dextrose 100 ml @  100 mls/hr Q24H IV  Last administere

d on 7/6/20at 08:39;  Start 6/30/20 at 08:30


Bupivacaine HCl/ Epinephrine Bitart (Sensorcain-Epi 0.5%-1:[id number] Mpf) 30 ml 

STK-MED ONCE .ROUTE ;  Start 6/30/20 at 08:34;  Stop 6/30/20 at 08:35;  Status 

DC


Iohexol (Omnipaque 300 Mg/ml) 50 ml STK-MED ONCE .ROUTE  Last administered on 

6/30/20at 13:30;  Start 6/30/20 at 08:35;  Stop 6/30/20 at 08:35;  Status DC


Sodium Chloride 80 meq/Potassium Chloride 30 meq/ Potassium Acetate 30 

meq/Magnesium Sulfate 14 meq/ Multivitamins 10 ml/Chromium/ Copper/Manganese/ 

Seleni/Zn 1 ml/ Insulin Human Regular 15 unit/ Total Parenteral Nutrition/Amino 

Acids/Dextrose/ Fat Emulsion Intravenous 1,920 ml @  80 mls/hr TPN  CONT IV  

Last administered on 7/1/20at 01:22;  Start 6/30/20 at 22:00;  Stop 7/1/20 at 

21:59;  Status DC


Phenylephrine HCl (Ken-Synephrine Inj) 10 mg STK-MED ONCE .ROUTE ;  Start 

6/30/20 at 10:15;  Stop 6/30/20 at 10:15;  Status DC


Desflurane (Suprane) 90 ml STK-MED ONCE IH ;  Start 6/30/20 at 10:18;  Stop 

6/30/20 at 10:19;  Status DC


Albumin Human 500 ml @  As Directed STK-MED ONCE IV ;  Start 6/30/20 at 11:06;  

Stop 6/30/20 at 11:06;  Status DC


Vasopressin (Vasostrict) 20 unit STK-MED ONCE .ROUTE ;  Start 6/30/20 at 12:23; 

Stop 6/30/20 at 12:23;  Status DC


Phenylephrine HCl (Ken-Synephrine Inj) 10 mg STK-MED ONCE .ROUTE ;  Start 

6/30/20 at 13:33;  Stop 6/30/20 at 13:33;  Status DC


Phenylephrine HCl (Ken-Synephrine Inj) 10 mg STK-MED ONCE .ROUTE ;  Start 

6/30/20 at 13:33;  Stop 6/30/20 at 13:33;  Status DC


Ondansetron HCl (Zofran) 4 mg STK-MED ONCE .ROUTE ;  Start 6/30/20 at 13:33;  

Stop 6/30/20 at 13:33;  Status DC


Enoxaparin Sodium (Lovenox 40mg Syringe) 40 mg Q24H SQ  Last administered on 

7/6/20at 08:39;  Start 7/1/20 at 08:00


Sodium Chloride (Normal Saline Flush) 3 ml QSHIFT  PRN IV AFTER MEDS AND BLOOD 

DRAWS;  Start 6/30/20 at 14:45


Naloxone HCl (Narcan) 0.4 mg PRN Q2MIN  PRN IV SEE INSTRUCTIONS;  Start 6/30/20 

at 14:45


Sodium Chloride 1,000 ml @  25 mls/hr Q24H IV  Last administered on 7/4/20at 

12:37;  Start 6/30/20 at 14:33


Morphine Sulfate (Morphine Sulfate) 1 mg PRN Q1HR  PRN IV PAIN;  Start 6/30/20 

at 14:45


Midazolam HCl 100 mg/Sodium Chloride 100 ml @ 1 mls/hr CONT  PRN IV SEE I/O R

ECORD Last administered on 7/3/20at 18:48;  Start 6/30/20 at 14:45


Phenylephrine HCl (PHENYLEPHRINE in 0.9% NACL PF) 1 mg STK-MED ONCE IV ;  Start 

6/30/20 at 14:44;  Stop 6/30/20 at 14:45;  Status DC


Ephedrine Sulfate (ePHEDrine PF IN SALINE SYRINGE) 50 mg STK-MED ONCE IV ;  

Start 6/30/20 at 14:45;  Stop 6/30/20 at 14:45;  Status DC


Vasopressin 20 unit/Dextrose 101 ml @  12 mls/hr CONT  PRN IV SEE I/O RECORD 

Last administered on 7/5/20at 21:23;  Start 6/30/20 at 15:30


Sodium Chloride 1,000 ml @  1,000 mls/hr 1X  ONCE IV  Last administered on 

6/30/20at 15:42;  Start 6/30/20 at 15:45;  Stop 6/30/20 at 16:44;  Status DC


Albumin Human 500 ml @  125 mls/hr 1X  ONCE IV ;  Start 6/30/20 at 16:00;  Stop 

6/30/20 at 19:59;  Status DC


Albumin Human 500 ml @  125 mls/hr PRN Q1HR  PRN IV PER PROTOCOL;  Start 6/30/20

at 15:45


Magnesium Sulfate 50 ml @ 25 mls/hr 1X  ONCE IV  Last administered on 6/30/20at 

17:02;  Start 6/30/20 at 16:30;  Stop 6/30/20 at 18:29;  Status DC


Sodium Bicarbonate (Sodium Bicarb Adult 8.4% Syr) 50 meq STK-MED ONCE .ROUTE ;  

Start 6/30/20 at 16:20;  Stop 6/30/20 at 16:20;  Status DC


Sodium Bicarbonate (Sodium Bicarb Adult 8.4% Syr) 100 meq 1X  ONCE IV  Last 

administered on 6/30/20at 17:07;  Start 6/30/20 at 16:30;  Stop 6/30/20 at 

16:31;  Status DC


Sodium Bicarbonate 150 meq/Dextrose 1,150 ml @  75 mls/hr 1X  ONCE IV  Last 

administered on 6/30/20at 20:02;  Start 6/30/20 at 16:30;  Stop 7/1/20 at 07:49;

 Status DC


Sodium Chloride 80 meq/Potassium Chloride 30 meq/ Potassium Acetate 30 

meq/Magnesium Sulfate 14 meq/ Multivitamins 10 ml/Chromium/ Copper/Manganese/ 

Seleni/Zn 1 ml/ Insulin Human Regular 15 unit/ Total Parenteral Nutrition/Amino 

Acids/Dextrose/ Fat Emulsion Intravenous 1,920 ml @  80 mls/hr TPN  CONT IV  

Last administered on 7/1/20at 23:05;  Start 7/1/20 at 22:00;  Stop 7/2/20 at 

21:59;  Status DC


Sodium Chloride 100 meq/Potassium Chloride 30 meq/ Potassium Acetate 30 

meq/Magnesium Sulfate 12 meq/ Multivitamins 10 ml/Chromium/ Copper/Manganese/ 

Seleni/Zn 1 ml/ Insulin Human Regular 15 unit/ Total Parenteral Nutrition/Amino 

Acids/Dextrose/ Fat Emulsion Intravenous 1,920 ml @  80 mls/hr TPN  CONT IV  

Last administered on 7/2/20at 21:52;  Start 7/2/20 at 22:00;  Stop 7/3/20 at 

21:59;  Status DC


Sodium Chloride 100 meq/Potassium Chloride 30 meq/ Potassium Acetate 30 

meq/Magnesium Sulfate 12 meq/ Multivitamins 10 ml/Chromium/ Copper/Manganese/ 

Seleni/Zn 1 ml/ Insulin Human Regular 15 unit/ Total Parenteral Nutrition/Amino 

Acids/Dextrose/ Fat Emulsion Intravenous 1,920 ml @  80 mls/hr TPN  CONT IV  Las

t administered on 7/3/20at 21:46;  Start 7/3/20 at 22:00;  Stop 7/4/20 at 21:59;

 Status DC


Sodium Chloride 100 meq/Potassium Chloride 30 meq/ Potassium Acetate 30 

meq/Magnesium Sulfate 12 meq/ Multivitamins 10 ml/Chromium/ Copper/Manganese/ 

Seleni/Zn 1 ml/ Insulin Human Regular 15 unit/ Total Parenteral Nutrition/Amino 

Acids/Dextrose/ Fat Emulsion Intravenous 1,800 ml @  75 mls/hr TPN  CONT IV  

Last administered on 7/4/20at 22:04;  Start 7/4/20 at 22:00;  Stop 7/5/20 at 

21:59;  Status DC


Fentanyl Citrate 55 ml @ 0 mls/hr CONT  PRN IV SEE COMMENTS Last administered on

7/5/20at 17:13;  Start 7/4/20 at 13:00


Sodium Chloride 100 meq/Potassium Chloride 30 meq/ Potassium Acetate 30 

meq/Magnesium Sulfate 12 meq/ Multivitamins 10 ml/Chromium/ Copper/Manganese/ 

Seleni/Zn 1 ml/ Insulin Human Regular 15 unit/ Total Parenteral Nutrition/Amino 

Acids/Dextrose/ Fat Emulsion Intravenous 1,680 ml @  70 mls/hr TPN  CONT IV  

Last administered on 7/5/20at 21:23;  Start 7/5/20 at 22:00;  Stop 7/6/20 at 

21:59





Active Scripts


Active


Reported


Bisoprolol Fumarate 5 Mg Tablet 10 Mg PO DAILY


Vitals/I & O





Vital Sign - Last 24 Hours








 7/5/20 7/5/20 7/5/20 7/5/20





 10:00 11:00 11:28 12:00


 


Pulse 92 90  


 


Resp 13 13  


 


B/P (MAP) 95/51 (66) 100/52 (68)  


 


Pulse Ox 98 100 99 


 


O2 Delivery Ventilator Ventilator Ventilator Mechanical Ventilator





 7/5/20 7/5/20 7/5/20 7/5/20





 12:00 13:00 14:00 15:00


 


Temp 98.2   





 98.2   


 


Pulse 82 84 88 80


 


Resp 13 13 12 22


 


B/P (MAP) 102/52 (69) 97/49 (65) 102/55 (71) 95/54 (68)


 


Pulse Ox 100 100 100 100


 


O2 Delivery Ventilator Ventilator Ventilator Ventilator


 


    





    





 7/5/20 7/5/20 7/5/20 7/5/20





 15:13 16:00 16:00 17:00


 


Temp  98.7  





  98.7  


 


Pulse  84  9


 


Resp  21  19


 


B/P (MAP)  117/74 (88)  97/74 (82)


 


Pulse Ox 100 100  94


 


O2 Delivery Ventilator Ventilator Mechanical Ventilator Ventilator


 


    





    





 7/5/20 7/5/20 7/5/20 7/5/20





 17:13 17:52 18:00 19:00


 


Pulse   98 89


 


Resp   20 26


 


B/P (MAP)   97/61 (73) 95/58 (70)


 


Pulse Ox 97 100 98 98


 


O2 Delivery Ventilator Ventilator Ventilator Ventilator





 7/5/20 7/5/20 7/5/20 7/5/20





 20:00 20:00 20:40 20:43


 


Temp  99.8  





  99.8  


 


Pulse  79  


 


Resp  14  


 


B/P (MAP)  107/68 (81)  


 


Pulse Ox  98 100 100


 


O2 Delivery Mechanical Ventilator Ventilator Ventilator Ventilator


 


    





    





 7/5/20 7/5/20 7/5/20 7/5/20





 21:00 22:00 23:00 23:52


 


Pulse 83 85 82 


 


Resp 23 15 16 


 


B/P (MAP) 105/59 (74) 93/58 (70) 104/60 (75) 


 


Pulse Ox 100 100 100 100


 


O2 Delivery Ventilator Ventilator Ventilator Ventilator





 7/6/20 7/6/20 7/6/20 7/6/20





 00:00 00:00 01:00 02:00


 


Temp  99.0  





  99.0  


 


Pulse  101 85 79


 


Resp  22 22 27


 


B/P (MAP)  92/54 (67) 94/51 (65) 96/56 (69)


 


Pulse Ox  100 100 100


 


O2 Delivery Mechanical Ventilator Ventilator Ventilator Ventilator


 


    





    





 7/6/20 7/6/20 7/6/20 7/6/20





 03:00 03:44 04:00 04:00


 


Temp    98.8





    98.8


 


Pulse 93   90


 


Resp 22   22


 


B/P (MAP) 101/63 (76)   105/67 (80)


 


Pulse Ox 100 100  100


 


O2 Delivery Ventilator Ventilator Mechanical Ventilator Ventilator


 


    





    





 7/6/20 7/6/20 7/6/20 7/6/20





 05:00 06:00 07:00 08:00


 


Pulse 112 80 76 


 


Resp 22 22 22 


 


B/P (MAP)  106/57 (73) 126/76 (93) 


 


Pulse Ox 100 100 100 


 


O2 Delivery Ventilator Ventilator Ventilator Mechanical Ventilator





 7/6/20   





 08:07   


 


Pulse Ox 100   


 


O2 Delivery Ventilator   














Intake and Output   


 


 7/5/20 7/5/20 7/6/20





 15:00 23:00 07:00


 


Intake Total 150 ml 1348 ml 1477 ml


 


Output Total 1125 ml 785 ml 1220 ml


 


Balance -975 ml 563 ml 257 ml











Justicifation of Admission Dx:


Justifications for Admission:


Justification of Admission Dx:  Yes











CLEMENTINA PANTOJA MD            Jul 6, 2020 09:38

## 2020-07-06 NOTE — PDOC
PULMONARY PROGRESS NOTES


Subjective


On assist control ventilation sedated


Vitals





Vital Signs








  Date Time  Temp Pulse Resp B/P (MAP) Pulse Ox O2 Delivery O2 Flow Rate FiO2


 


7/6/20 08:07     100 Ventilator  


 


7/6/20 07:00  76 22 126/76 (93)    


 


7/6/20 04:00 98.8       





 98.8       








Comments


trach/sedated on vent


Lungs:  Crackles


Cardiovascular:  S1, S2


Abdomen:  Soft, Non-tender, Other (multiple ROBERT drains )


Extremities:  Other (+1 BLE edema)


Skin:  Warm


Labs





Laboratory Tests








Test


 7/4/20


11:50 7/4/20


18:09 7/5/20


00:20 7/5/20


05:57


 


Glucose (Fingerstick)


 149 mg/dL


(70-99) 127 mg/dL


(70-99) 126 mg/dL


(70-99) 120 mg/dL


(70-99)


 


Test


 7/5/20


06:00 7/5/20


12:49 7/5/20


18:06 7/5/20


23:53


 


Sodium Level


 134 mmol/L


(136-145) 


 


 





 


Potassium Level


 4.7 mmol/L


(3.5-5.1) 


 


 





 


Chloride Level


 102 mmol/L


() 


 


 





 


Carbon Dioxide Level


 29 mmol/L


(21-32) 


 


 





 


Anion Gap 3 (6-14)    


 


Blood Urea Nitrogen


 21 mg/dL


(7-20) 


 


 





 


Creatinine


 0.6 mg/dL


(0.6-1.0) 


 


 





 


Estimated GFR


(Cockcroft-Gault) 106.3 


 


 


 





 


Glucose Level


 126 mg/dL


(70-99) 


 


 





 


Calcium Level


 8.8 mg/dL


(8.5-10.1) 


 


 





 


Glucose (Fingerstick)


 


 169 mg/dL


(70-99) 132 mg/dL


(70-99) 147 mg/dL


(70-99)


 


Test


 7/6/20


06:15 7/6/20


06:21 


 





 


White Blood Count


 18.0 x10^3/uL


(4.0-11.0) 


 


 





 


Red Blood Count


 2.62 x10^6/uL


(3.50-5.40) 


 


 





 


Hemoglobin


 7.7 g/dL


(12.0-15.5) 


 


 





 


Hematocrit


 23.4 %


(36.0-47.0) 


 


 





 


Mean Corpuscular Volume 89 fL ()    


 


Mean Corpuscular Hemoglobin 30 pg (25-35)    


 


Mean Corpuscular Hemoglobin


Concent 33 g/dL


(31-37) 


 


 





 


Red Cell Distribution Width


 14.5 %


(11.5-14.5) 


 


 





 


Platelet Count


 375 x10^3/uL


(140-400) 


 


 





 


Neutrophils (%) (Auto) 86 % (31-73)    


 


Lymphocytes (%) (Auto) 7 % (24-48)    


 


Monocytes (%) (Auto) 6 % (0-9)    


 


Eosinophils (%) (Auto) 1 % (0-3)    


 


Basophils (%) (Auto) 0 % (0-3)    


 


Neutrophils # (Auto)


 15.5 x10^3/uL


(1.8-7.7) 


 


 





 


Lymphocytes # (Auto)


 1.2 x10^3/uL


(1.0-4.8) 


 


 





 


Monocytes # (Auto)


 1.0 x10^3/uL


(0.0-1.1) 


 


 





 


Eosinophils # (Auto)


 0.2 x10^3/uL


(0.0-0.7) 


 


 





 


Basophils # (Auto)


 0.0 x10^3/uL


(0.0-0.2) 


 


 





 


Sodium Level


 135 mmol/L


(136-145) 


 


 





 


Potassium Level


 4.5 mmol/L


(3.5-5.1) 


 


 





 


Chloride Level


 103 mmol/L


() 


 


 





 


Carbon Dioxide Level


 29 mmol/L


(21-32) 


 


 





 


Anion Gap 3 (6-14)    


 


Blood Urea Nitrogen


 19 mg/dL


(7-20) 


 


 





 


Creatinine


 0.5 mg/dL


(0.6-1.0) 


 


 





 


Estimated GFR


(Cockcroft-Gault) 131.1 


 


 


 





 


Glucose Level


 155 mg/dL


(70-99) 


 


 





 


Calcium Level


 9.2 mg/dL


(8.5-10.1) 


 


 





 


Phosphorus Level


 3.4 mg/dL


(2.6-4.7) 


 


 





 


Magnesium Level


 1.8 mg/dL


(1.8-2.4) 


 


 





 


Triglycerides Level


 207 mg/dL


(0-150) 


 


 





 


Glucose (Fingerstick)


 


 151 mg/dL


(70-99) 


 











Laboratory Tests








Test


 7/5/20


12:49 7/5/20


18:06 7/5/20


23:53 7/6/20


06:15


 


Glucose (Fingerstick)


 169 mg/dL


(70-99) 132 mg/dL


(70-99) 147 mg/dL


(70-99) 





 


White Blood Count


 


 


 


 18.0 x10^3/uL


(4.0-11.0)


 


Red Blood Count


 


 


 


 2.62 x10^6/uL


(3.50-5.40)


 


Hemoglobin


 


 


 


 7.7 g/dL


(12.0-15.5)


 


Hematocrit


 


 


 


 23.4 %


(36.0-47.0)


 


Mean Corpuscular Volume    89 fL () 


 


Mean Corpuscular Hemoglobin    30 pg (25-35) 


 


Mean Corpuscular Hemoglobin


Concent 


 


 


 33 g/dL


(31-37)


 


Red Cell Distribution Width


 


 


 


 14.5 %


(11.5-14.5)


 


Platelet Count


 


 


 


 375 x10^3/uL


(140-400)


 


Neutrophils (%) (Auto)    86 % (31-73) 


 


Lymphocytes (%) (Auto)    7 % (24-48) 


 


Monocytes (%) (Auto)    6 % (0-9) 


 


Eosinophils (%) (Auto)    1 % (0-3) 


 


Basophils (%) (Auto)    0 % (0-3) 


 


Neutrophils # (Auto)


 


 


 


 15.5 x10^3/uL


(1.8-7.7)


 


Lymphocytes # (Auto)


 


 


 


 1.2 x10^3/uL


(1.0-4.8)


 


Monocytes # (Auto)


 


 


 


 1.0 x10^3/uL


(0.0-1.1)


 


Eosinophils # (Auto)


 


 


 


 0.2 x10^3/uL


(0.0-0.7)


 


Basophils # (Auto)


 


 


 


 0.0 x10^3/uL


(0.0-0.2)


 


Sodium Level


 


 


 


 135 mmol/L


(136-145)


 


Potassium Level


 


 


 


 4.5 mmol/L


(3.5-5.1)


 


Chloride Level


 


 


 


 103 mmol/L


()


 


Carbon Dioxide Level


 


 


 


 29 mmol/L


(21-32)


 


Anion Gap    3 (6-14) 


 


Blood Urea Nitrogen


 


 


 


 19 mg/dL


(7-20)


 


Creatinine


 


 


 


 0.5 mg/dL


(0.6-1.0)


 


Estimated GFR


(Cockcroft-Gault) 


 


 


 131.1 





 


Glucose Level


 


 


 


 155 mg/dL


(70-99)


 


Calcium Level


 


 


 


 9.2 mg/dL


(8.5-10.1)


 


Phosphorus Level


 


 


 


 3.4 mg/dL


(2.6-4.7)


 


Magnesium Level


 


 


 


 1.8 mg/dL


(1.8-2.4)


 


Triglycerides Level


 


 


 


 207 mg/dL


(0-150)


 


Test


 7/6/20


06:21 


 


 





 


Glucose (Fingerstick)


 151 mg/dL


(70-99) 


 


 











Medications





Active Scripts








 Medications  Dose


 Route/Sig


 Max Daily Dose Days Date Category


 


 Bisoprolol


 Fumarate 5 Mg


 Tablet  10 Mg


 PO DAILY


   3/16/20 Reported








Comments


 CXR 6/28/20


IMPRESSION:


No significant interval change compared to 6/25/2020.


 











ct abdomen /pelvis 6/6


1. Removal of the percutaneous pigtail drainage catheters since the prior 


exam. Sequela of pancreatitis with extensive pseudocysts again 


demonstrated, the right-sided collections are slightly larger since the 


prior exam, the left-sided collections are stable. See above.


2. Moderate to large left pleural effusion with atelectasis and collapse 


of most of the left lower lobe, stable. Small right pleural effusion is 


stable.


3. Gallstone.





ct chest 6/15 reviewed








 GRAM NEG COCCOBACILLI:MANY


        SQUAMOUS EPI CELL:RARE


        PMN (WBCs):FEW


        Unless otherwise specified, Testing Performed by:


        [address] [address]


        For Inquires, the Physician may contact the Microbiology


        department at [phone]





  RESPIRATORY CULTURE  Final  


        Final





        MANY GRAM NEGATIVE RODS on 06/15/20 at 1107


        FINAL ID= [PSEUDOMONAS AERUGINOSA]


        MICRO CHARGES


        PSEUDOMONAS AERUGINOSA





  ANTIMICROBIAL SUSCEPTIBILITY  Final  


        Comment





        NEG ANSON 56


        PSEUDOMONAS AERUGINOSA


        ANTIBIOTIC                        RESULT          INTERPRETATION


        AMIKACIN                          <=16                  S


        AZTREONAM                         <=4                   S


        CEFTAZIDIME                       <=1                   S


        CIPROFLOXACIN                     <=0.25                S


        CEFEPIME                          <=2                   S


        CEFTAZIDIME/AVIBACTAM             <=4                   S


        GENTAMICIN                        <=2                   S


        LEVOFLOXACIN                      <=0.5                 S





Impression


.





IMPRESSION:


1.  Acute hypoxemic respiratory failure secondary to ARDS status post trach, 

developed anemia 6/7, blood drainage from RLQ abdomen drain site, and 

surrounding firmness  / developed septic shock 6/7 from abdomen source, required

levo 6/7


s/p 3 new drains 6/7 with brown color drainage,  


2.  Gallstone pancreatitis, now with ongoing bleeding from prior drain. Anemic. 

s/p Tx multiple units over several days


3.  septic shock/sepsis, recurrent 6/7, source abdomen. ,


4.  Acute kidney injury-, Off HD--renal function decling. suspect JED on CKD due

to hypotension , improved now


5.  Acute gallstone pancreatitis.


6.  Hypoalbuminemia.


7.  Moderate persistent effusions, s/p left thora  5/12, reaccumulation of left 

effusion. O2 requirement not changed. 


8.  Fever- ,hypotension. suspect recurrent sepsis/ likely pancreatic source.  

Per ID, per surgery--


9.  Chronic anemia-- ongoing / s/p PRBC


10. Covid 19 testing negative


11. Moderate to large ascites-S/P paracentisis


12.S/P paracentisis with 4 liters removed on 4/15/20


13. S/P IR drain placement on 5/8/2020, removal, re inserted 6/7


14. Depression/Anxiety 


15. Increase effusion, ? loculated/ s/p chest tube.. drainage slowing down. 





6/30


S/P Exploratory laparotomy, lysis of adhesions, subtotal cholecystectomy with 

cholangiogram, gastrojejunostomy tube placement, pancreatic necrosectomy


leukocytosis- improving





Plan


.


Will discontinue sedation and try pressure support as tolerated


S/P Exploratory laparotomy, lysis of adhesions, subtotal cholecystectomy with 

cholangiogram, gastrojejunostomy tube placement, pancreatic necrosectomy with 

multiple drains in place-- on 6/30 


ABX per ID 


Follow surgery recs


Continue TF and TPN for nutrition support 


Vasopressors for hypotension to keep MAP above 65 


DVT/GI PPX


D/W RN and RT 








CC time 30 minutes











STEVE MIRANDA MD               Jul 6, 2020 08:59

## 2020-07-06 NOTE — NUR
Pharmacy Vancomycin Dosing Note



S:Consulted to monitor and dose vancomycin started .



O:JESENIA BEAN is a 49 year old F with  .



Height: 5 feet, 8 inches

Weight: 103.011501 kg

Ideal Body Weight: 

Adjusted Body Weight: 

Dosing Weight: 



Other Antibiotics: 

Pharmacy TPN Dosing Note



S: JESENIA BEAN is a 49 year old F Currently receiving Central Continuous TPN started 
03/18/20



B:Pertinent PMH: 

Necrotizing pancreatitis

Height: 5 feet, 8 inches

Weight: 103.429468 kg



Current diet: NPO 



LABS:

Sodium:    135 

Potassium: 4.5 

Chloride:  103 

Calcium:   9.2 

Corrected Calcium: 11.68 

Magnesium: 1.8 

CO2:       29 

SCr:       0.6 

Glucose:   151 

Albumin:   0.9 

AST:       28 

ALT:       68 



TPN FORMULA:

TPN TYPE:  Central Continuous

AMINO ACIDS:         80 gm

DEXTROSE:            250 gm

LIPIDS:              20 gm

SODIUM CHLORIDE:     110 mEq

SODIUM ACETATE:      - mEq

SODIUM PHOSPHATE:    - mmol

POTASSIUM CHLORIDE:  30 mEq

POTASSIUM ACETATE:   30 mEq

POTASSIUM PHOSPHATE: - mmol

MAGNESIUM:           15 mEq

CALCIUM:             - mEq

INSULIN:             15 units

MULTIPLE VITAMIN:    10 ml

TRACE ELEMENTS:      1 ml ml(s)



TPN PLAN:  

increase NACL  MEQ AND MAGSO4 TO 15 MEQ





R: Continue TPN AT 70 ML/HR

Will monitor electrolytes, glucose, and tolerance to TPN.



 YENIFER STREETER RPH, 07/06/20 [id number]

LABS:

Last BUN: 

Last Creatinine: 0.6 

Creatinine Clearance: mL/min

Last WBC: 

Last Procalcitonin: 

Tmax (past 24 hours): 



Microbiology: 



I/O: 



Drug Levels:

Last  level:  on  at 

Last dose given  at 



Vancomycin Dosing:

Loading Dose:  x1

Dosing Weight: 

Target Trough: 





A: Based on: []





P: 1. [g RX.ACTION] Vancomycin  IV 

   2. Follow up  level on  at 

   3. Pharmacy will continue to monitor, follow and adjust therapy as needed.

 

 YENIFER STREETER RPH, 07/06/20 [id number]

## 2020-07-07 VITALS — SYSTOLIC BLOOD PRESSURE: 144 MMHG | DIASTOLIC BLOOD PRESSURE: 99 MMHG

## 2020-07-07 VITALS — DIASTOLIC BLOOD PRESSURE: 64 MMHG | SYSTOLIC BLOOD PRESSURE: 110 MMHG

## 2020-07-07 VITALS — DIASTOLIC BLOOD PRESSURE: 77 MMHG | SYSTOLIC BLOOD PRESSURE: 141 MMHG

## 2020-07-07 VITALS — SYSTOLIC BLOOD PRESSURE: 139 MMHG | DIASTOLIC BLOOD PRESSURE: 52 MMHG

## 2020-07-07 VITALS — SYSTOLIC BLOOD PRESSURE: 99 MMHG | DIASTOLIC BLOOD PRESSURE: 54 MMHG

## 2020-07-07 VITALS — SYSTOLIC BLOOD PRESSURE: 132 MMHG | DIASTOLIC BLOOD PRESSURE: 68 MMHG

## 2020-07-07 VITALS — SYSTOLIC BLOOD PRESSURE: 111 MMHG | DIASTOLIC BLOOD PRESSURE: 66 MMHG

## 2020-07-07 VITALS — SYSTOLIC BLOOD PRESSURE: 93 MMHG | DIASTOLIC BLOOD PRESSURE: 51 MMHG

## 2020-07-07 VITALS — SYSTOLIC BLOOD PRESSURE: 119 MMHG | DIASTOLIC BLOOD PRESSURE: 76 MMHG

## 2020-07-07 VITALS — SYSTOLIC BLOOD PRESSURE: 109 MMHG | DIASTOLIC BLOOD PRESSURE: 56 MMHG

## 2020-07-07 VITALS — SYSTOLIC BLOOD PRESSURE: 119 MMHG | DIASTOLIC BLOOD PRESSURE: 66 MMHG

## 2020-07-07 VITALS — DIASTOLIC BLOOD PRESSURE: 73 MMHG | SYSTOLIC BLOOD PRESSURE: 109 MMHG

## 2020-07-07 VITALS — DIASTOLIC BLOOD PRESSURE: 81 MMHG | SYSTOLIC BLOOD PRESSURE: 131 MMHG

## 2020-07-07 VITALS — SYSTOLIC BLOOD PRESSURE: 104 MMHG | DIASTOLIC BLOOD PRESSURE: 52 MMHG

## 2020-07-07 VITALS — DIASTOLIC BLOOD PRESSURE: 51 MMHG | SYSTOLIC BLOOD PRESSURE: 98 MMHG

## 2020-07-07 VITALS — DIASTOLIC BLOOD PRESSURE: 76 MMHG | SYSTOLIC BLOOD PRESSURE: 133 MMHG

## 2020-07-07 VITALS — SYSTOLIC BLOOD PRESSURE: 95 MMHG | DIASTOLIC BLOOD PRESSURE: 74 MMHG

## 2020-07-07 VITALS — DIASTOLIC BLOOD PRESSURE: 79 MMHG | SYSTOLIC BLOOD PRESSURE: 122 MMHG

## 2020-07-07 VITALS — SYSTOLIC BLOOD PRESSURE: 134 MMHG | DIASTOLIC BLOOD PRESSURE: 92 MMHG

## 2020-07-07 VITALS — DIASTOLIC BLOOD PRESSURE: 83 MMHG | SYSTOLIC BLOOD PRESSURE: 129 MMHG

## 2020-07-07 VITALS — SYSTOLIC BLOOD PRESSURE: 131 MMHG | DIASTOLIC BLOOD PRESSURE: 73 MMHG

## 2020-07-07 VITALS — SYSTOLIC BLOOD PRESSURE: 95 MMHG | DIASTOLIC BLOOD PRESSURE: 52 MMHG

## 2020-07-07 VITALS — DIASTOLIC BLOOD PRESSURE: 52 MMHG | SYSTOLIC BLOOD PRESSURE: 95 MMHG

## 2020-07-07 VITALS — DIASTOLIC BLOOD PRESSURE: 77 MMHG | SYSTOLIC BLOOD PRESSURE: 118 MMHG

## 2020-07-07 LAB
BASOPHILS # BLD AUTO: 0 X10^3/UL (ref 0–0.2)
BASOPHILS NFR BLD: 0 % (ref 0–3)
EOSINOPHIL NFR BLD: 0.2 X10^3/UL (ref 0–0.7)
EOSINOPHIL NFR BLD: 1 % (ref 0–3)
ERYTHROCYTE [DISTWIDTH] IN BLOOD BY AUTOMATED COUNT: 14.8 % (ref 11.5–14.5)
HCT VFR BLD CALC: 22.5 % (ref 36–47)
HGB BLD-MCNC: 7.5 G/DL (ref 12–15.5)
LYMPHOCYTES # BLD: 1 X10^3/UL (ref 1–4.8)
LYMPHOCYTES NFR BLD AUTO: 7 % (ref 24–48)
MCH RBC QN AUTO: 30 PG (ref 25–35)
MCHC RBC AUTO-ENTMCNC: 33 G/DL (ref 31–37)
MCV RBC AUTO: 89 FL (ref 79–100)
MONO #: 0.9 X10^3/UL (ref 0–1.1)
MONOCYTES NFR BLD: 6 % (ref 0–9)
NEUT #: 12.9 X10^3/UL (ref 1.8–7.7)
NEUTROPHILS NFR BLD AUTO: 86 % (ref 31–73)
PLATELET # BLD AUTO: 414 X10^3/UL (ref 140–400)
RBC # BLD AUTO: 2.54 X10^6/UL (ref 3.5–5.4)
WBC # BLD AUTO: 15 X10^3/UL (ref 4–11)

## 2020-07-07 RX ADMIN — ACETYLCYSTEINE SCH MG: 200 INHALANT RESPIRATORY (INHALATION) at 08:00

## 2020-07-07 RX ADMIN — IPRATROPIUM BROMIDE AND ALBUTEROL SULFATE SCH ML: .5; 3 SOLUTION RESPIRATORY (INHALATION) at 03:58

## 2020-07-07 RX ADMIN — MEROPENEM SCH MLS/HR: 500 INJECTION, POWDER, FOR SOLUTION INTRAVENOUS at 12:22

## 2020-07-07 RX ADMIN — IPRATROPIUM BROMIDE AND ALBUTEROL SULFATE SCH ML: .5; 3 SOLUTION RESPIRATORY (INHALATION) at 15:15

## 2020-07-07 RX ADMIN — INSULIN LISPRO SCH UNITS: 100 INJECTION, SOLUTION INTRAVENOUS; SUBCUTANEOUS at 05:37

## 2020-07-07 RX ADMIN — IPRATROPIUM BROMIDE AND ALBUTEROL SULFATE SCH ML: .5; 3 SOLUTION RESPIRATORY (INHALATION) at 00:00

## 2020-07-07 RX ADMIN — DEXTROSE SCH MLS/HR: 50 INJECTION, SOLUTION INTRAVENOUS at 08:33

## 2020-07-07 RX ADMIN — INSULIN LISPRO SCH UNITS: 100 INJECTION, SOLUTION INTRAVENOUS; SUBCUTANEOUS at 00:00

## 2020-07-07 RX ADMIN — IPRATROPIUM BROMIDE AND ALBUTEROL SULFATE SCH ML: .5; 3 SOLUTION RESPIRATORY (INHALATION) at 12:05

## 2020-07-07 RX ADMIN — INSULIN LISPRO SCH UNITS: 100 INJECTION, SOLUTION INTRAVENOUS; SUBCUTANEOUS at 17:51

## 2020-07-07 RX ADMIN — DEXTROSE PRN MLS/HR: 50 INJECTION, SOLUTION INTRAVENOUS at 04:17

## 2020-07-07 RX ADMIN — INSULIN LISPRO SCH UNITS: 100 INJECTION, SOLUTION INTRAVENOUS; SUBCUTANEOUS at 12:24

## 2020-07-07 RX ADMIN — IPRATROPIUM BROMIDE AND ALBUTEROL SULFATE SCH ML: .5; 3 SOLUTION RESPIRATORY (INHALATION) at 08:59

## 2020-07-07 RX ADMIN — IPRATROPIUM BROMIDE AND ALBUTEROL SULFATE SCH ML: .5; 3 SOLUTION RESPIRATORY (INHALATION) at 20:27

## 2020-07-07 RX ADMIN — MEROPENEM SCH MLS/HR: 500 INJECTION, POWDER, FOR SOLUTION INTRAVENOUS at 05:34

## 2020-07-07 RX ADMIN — IPRATROPIUM BROMIDE AND ALBUTEROL SULFATE SCH ML: .5; 3 SOLUTION RESPIRATORY (INHALATION) at 23:43

## 2020-07-07 RX ADMIN — ENOXAPARIN SODIUM SCH MG: 40 INJECTION SUBCUTANEOUS at 08:33

## 2020-07-07 RX ADMIN — Medication PRN EACH: at 12:05

## 2020-07-07 RX ADMIN — MEROPENEM SCH MLS/HR: 500 INJECTION, POWDER, FOR SOLUTION INTRAVENOUS at 17:51

## 2020-07-07 RX ADMIN — ACETYLCYSTEINE SCH MG: 200 INHALANT RESPIRATORY (INHALATION) at 20:27

## 2020-07-07 RX ADMIN — PANTOPRAZOLE SODIUM SCH MG: 40 INJECTION, POWDER, FOR SOLUTION INTRAVENOUS at 08:32

## 2020-07-07 RX ADMIN — BACITRACIN SCH MLS/HR: 5000 INJECTION, POWDER, FOR SOLUTION INTRAMUSCULAR at 21:31

## 2020-07-07 NOTE — NUR
Pharmacy TPN Dosing Note



S: JESENIA BEAN is a 49 year old F Currently receiving Central Continuous TPN started 
03/18/20



B:Pertinent PMH: Necrotizing pancreatitis

Height: 5 feet, 8 inches

Weight: 95.0 kg



Current diet: NPO 



LABS:

Sodium:    135 

Potassium: 4.5 

Chloride:  103 

Calcium:   9.2 

Corrected Calcium: 11.68 

Magnesium: 1.8 

CO2:       29 

SCr:       0.6 

Glucose:   151 

Albumin:   0.9 

AST:       28 

ALT:       68 



TPN FORMULA:

TPN TYPE:  Central Continuous

AMINO ACIDS:         80 gm

DEXTROSE:            250 gm

LIPIDS:              20 gm



SODIUM CHLORIDE:     110 mEq

POTASSIUM CHLORIDE:  30 mEq

POTASSIUM ACETATE:   30 mEq

MAGNESIUM:           15 mEq

INSULIN:             15 units

MULTIPLE VITAMIN:    10 ml

TRACE ELEMENTS:      1 ml 



TPN PLAN:  No labs today. Cont same formula.





R: Continue TPN 

Will monitor electrolytes, glucose, and tolerance to TPN.



 Maribell Vazquez RPH, 07/07/20 [id number]

## 2020-07-07 NOTE — PDOC
Objective:


Objective:


D/w nurse - will wake up, not following commands, tolerating tube feeds, weaning

vasopressin.


Vital Signs:





                                   Vital Signs








  Date Time  Temp Pulse Resp B/P (MAP) Pulse Ox O2 Delivery O2 Flow Rate FiO2


 


7/7/20 11:00  105 14 109/56 (73) 100 Ventilator  


 


7/7/20 08:00 97.8       





 97.8       


 


7/7/20 00:25       40.0 








Labs:





Laboratory Tests








Test


 7/6/20


11:56 7/6/20


16:51 7/7/20


00:00 7/7/20


05:32


 


Glucose (Fingerstick)


 154 mg/dL


(70-99) 153 mg/dL


(70-99) 123 mg/dL


(70-99) 159 mg/dL


(70-99)











PE:





GEN: chronically ill


LUNGS: trach/vent, clear


HEART: mildly tachycardic


ABD: drains... G tube bilious, J tube w/ feeds


NEURO/PSYCH: sleeping





A/P:


Severe gallstone pancreatitis s/p subtotal cholecystectomy, gastrojejunostomy 

placement, pancreatic necrosectomy





--


Continue support per GI.





Justicifation of Admission Dx:


Justifications for Admission:


Justification of Admission Dx:  Yes











RAGHAVENDRA MURCIA          Jul 7, 2020 11:27

## 2020-07-07 NOTE — PDOC
PROGRESS NOTES


Chief Complaint


Chief Complaint


late entry, pt seen 7.6,  janel, discussed wtih RN, then the computers went down





Acute hypoxic Respiratory failure required  mechanical ventilation


Tracheostomy


bilateral pleural effusions/pulm edema s/p Throacentesis on 6/15/2020


Severe Acute gallstone pancreatitis (not a surgical candidate at this time) with

necrosis


Acute kidney failure now requiring dialysis


Gallstones (Calculus of gallbladder with acute cholecystitis without 

obstruction)


HTN 


Intractable pain


Intractable nausea


Covid 19 negative. 


Acute on chronic anemia 


EEG: No seizure activityFever  - better currently - intermittent could be from 

underlying pancreatitis blood cults 5/4 - neg so far


? Ileus with vomiting


Abd distention - U/S and CT reviewed s/p 0.4 L of opaque, debris-containing 

ascites was removed 5/6


Acute pancreatitis with persistent necrosis


Gallstone pancreatitis with necrosis. 


   -CT A/P 6/6 showed multiple pseudocysts, slight larger on the right. s/p 

drains x 3, 6/7.  + PSAE (MDRO-R Cefepime, Zosyn ANSON < 64) and yeast, 


   -s/p drain 4/27. C. parapsilosis. s/p drain 5/6 + yeast & high amylase; s/p 

additional drain on 5/8. Drains removed. 


Ascites s/p paracentesis 4/15 & 5/6. C. parapsilosis 


JED. off HD. 


A large fluid collection in the pancreatic bed has slightly decreased in size, 

described below, the pancreas itself is difficult to  visualize, which could be 

due to necrosis or obscuration of pancreatic  parenchyma from the surrounding 

fluid collection.6/15 


- 4/27 status post ROBERT drain placement + C paropsilosis. s/p additional drains 

5/8


Anemia - S/p PRBCs


Cholelithiasis with thickening of the gallbladder wall.


Leucocytosis improving


JED, hyperkalemia, Metabolic acidosis off dialysis


hypocalcemia 


Prediabetes


HTN


s/p trach


ESRD on HD


Hyperglycemia


severe protein-caloric malnutrition


Moderate to large left pleural effusion with atelectasis and collapse  of most 

of the left lower lobe, stable


 


Dispo - ICU, critically ill


Poor prognosis





History of Present Illness


History of Present Illness


7/7,  awake on vent


weaning sedation


cont other, 





.    seen and evaluated,   still with marked drainage


tube cont


 


prognosis still poor, but improving slowly





Vitals


Vitals





Vital Signs








  Date Time  Temp Pulse Resp B/P (MAP) Pulse Ox O2 Delivery O2 Flow Rate FiO2


 


7/7/20 14:00  103 14 119/76 (90) 100 Ventilator  


 


7/7/20 12:00 96.9       





 96.9       


 


7/7/20 00:25       40.0 











Physical Exam


Physical Exam


GENERAL: Alert but not responsive. ill appearing


HEENT: Pupils equal, oral cavity dry. + NGT


NECK:  Tracheostomy 


LUNGS: Diminished aeration bases,  CT on left 


HEART:  S1, S2, regular w/ PVCs 


ABDOMEN: Sightly Distended,  bowel sounds hypoactive, soft, richardson x 2, 3 ROBERT 

drains, G-J tube and + wound vac 


: [place] in place 


EXTREMITIES: Generalized edema, no cyanosis. SCDs & Podus boots bilaterally  


SKIN: warm touch. No signs of rash.  


LUE-PICC without signs of complications 


LUE art-line out, mottling left forearm about ole art-line site is improving, 

some better. RP palpable, cap refill brisk.


NEURO: alert but not responsive - ? tracking


General:  Alert, No acute distress


Heart:  Regular rate (SR/ST), Other (distant heart sounds)


Lungs:  Crackles


Abdomen:  Soft, Other (tubes in place, G-tube portion with bilious output, santosh J

tube feeds, various drains with decreasing output)


Extremities:  Other (Diffuse edema)


Skin:  No rashes, No significant lesion





Labs


LABS





Laboratory Tests








Test


 7/6/20


16:51 7/7/20


00:00 7/7/20


05:20 7/7/20


05:32


 


Glucose (Fingerstick)


 153 mg/dL


(70-99) 123 mg/dL


(70-99) 


 159 mg/dL


(70-99)


 


White Blood Count


 


 


 15.0 x10^3/uL


(4.0-11.0) 





 


Red Blood Count


 


 


 2.54 x10^6/uL


(3.50-5.40) 





 


Hemoglobin


 


 


 7.5 g/dL


(12.0-15.5) 





 


Hematocrit


 


 


 22.5 %


(36.0-47.0) 





 


Mean Corpuscular Volume   89 fL ()  


 


Mean Corpuscular Hemoglobin   30 pg (25-35)  


 


Mean Corpuscular Hemoglobin


Concent 


 


 33 g/dL


(31-37) 





 


Red Cell Distribution Width


 


 


 14.8 %


(11.5-14.5) 





 


Platelet Count


 


 


 414 x10^3/uL


(140-400) 





 


Neutrophils (%) (Auto)   86 % (31-73)  


 


Lymphocytes (%) (Auto)   7 % (24-48)  


 


Monocytes (%) (Auto)   6 % (0-9)  


 


Eosinophils (%) (Auto)   1 % (0-3)  


 


Basophils (%) (Auto)   0 % (0-3)  


 


Neutrophils # (Auto)


 


 


 12.9 x10^3/uL


(1.8-7.7) 





 


Lymphocytes # (Auto)


 


 


 1.0 x10^3/uL


(1.0-4.8) 





 


Monocytes # (Auto)


 


 


 0.9 x10^3/uL


(0.0-1.1) 





 


Eosinophils # (Auto)


 


 


 0.2 x10^3/uL


(0.0-0.7) 





 


Basophils # (Auto)


 


 


 0.0 x10^3/uL


(0.0-0.2) 





 


Test


 7/7/20


12:21 


 


 





 


Glucose (Fingerstick)


 164 mg/dL


(70-99) 


 


 














Assessment and Plan


Assessmemt and Plan


Problems


Medical Problems:


(1) Acute pancreatitis


Status: Acute  





(2) Cholelithiasis


Status: Acute  











Comment


Review of Relevant


I have reviewed the following items josy (where applicable) has been applied.


Labs





Laboratory Tests








Test


 7/5/20


18:06 7/5/20


23:53 7/6/20


06:15 7/6/20


06:21


 


Glucose (Fingerstick)


 132 mg/dL


(70-99) 147 mg/dL


(70-99) 


 151 mg/dL


(70-99)


 


White Blood Count


 


 


 18.0 x10^3/uL


(4.0-11.0) 





 


Red Blood Count


 


 


 2.62 x10^6/uL


(3.50-5.40) 





 


Hemoglobin


 


 


 7.7 g/dL


(12.0-15.5) 





 


Hematocrit


 


 


 23.4 %


(36.0-47.0) 





 


Mean Corpuscular Volume   89 fL ()  


 


Mean Corpuscular Hemoglobin   30 pg (25-35)  


 


Mean Corpuscular Hemoglobin


Concent 


 


 33 g/dL


(31-37) 





 


Red Cell Distribution Width


 


 


 14.5 %


(11.5-14.5) 





 


Platelet Count


 


 


 375 x10^3/uL


(140-400) 





 


Neutrophils (%) (Auto)   86 % (31-73)  


 


Lymphocytes (%) (Auto)   7 % (24-48)  


 


Monocytes (%) (Auto)   6 % (0-9)  


 


Eosinophils (%) (Auto)   1 % (0-3)  


 


Basophils (%) (Auto)   0 % (0-3)  


 


Neutrophils # (Auto)


 


 


 15.5 x10^3/uL


(1.8-7.7) 





 


Lymphocytes # (Auto)


 


 


 1.2 x10^3/uL


(1.0-4.8) 





 


Monocytes # (Auto)


 


 


 1.0 x10^3/uL


(0.0-1.1) 





 


Eosinophils # (Auto)


 


 


 0.2 x10^3/uL


(0.0-0.7) 





 


Basophils # (Auto)


 


 


 0.0 x10^3/uL


(0.0-0.2) 





 


Sodium Level


 


 


 135 mmol/L


(136-145) 





 


Potassium Level


 


 


 4.5 mmol/L


(3.5-5.1) 





 


Chloride Level


 


 


 103 mmol/L


() 





 


Carbon Dioxide Level


 


 


 29 mmol/L


(21-32) 





 


Anion Gap   3 (6-14)  


 


Blood Urea Nitrogen


 


 


 19 mg/dL


(7-20) 





 


Creatinine


 


 


 0.5 mg/dL


(0.6-1.0) 





 


Estimated GFR


(Cockcroft-Gault) 


 


 131.1 


 





 


Glucose Level


 


 


 155 mg/dL


(70-99) 





 


Calcium Level


 


 


 9.2 mg/dL


(8.5-10.1) 





 


Phosphorus Level


 


 


 3.4 mg/dL


(2.6-4.7) 





 


Magnesium Level


 


 


 1.8 mg/dL


(1.8-2.4) 





 


Triglycerides Level


 


 


 207 mg/dL


(0-150) 





 


Test


 7/6/20


11:56 7/6/20


16:51 7/7/20


00:00 7/7/20


05:20


 


Glucose (Fingerstick)


 154 mg/dL


(70-99) 153 mg/dL


(70-99) 123 mg/dL


(70-99) 





 


White Blood Count


 


 


 


 15.0 x10^3/uL


(4.0-11.0)


 


Red Blood Count


 


 


 


 2.54 x10^6/uL


(3.50-5.40)


 


Hemoglobin


 


 


 


 7.5 g/dL


(12.0-15.5)


 


Hematocrit


 


 


 


 22.5 %


(36.0-47.0)


 


Mean Corpuscular Volume    89 fL () 


 


Mean Corpuscular Hemoglobin    30 pg (25-35) 


 


Mean Corpuscular Hemoglobin


Concent 


 


 


 33 g/dL


(31-37)


 


Red Cell Distribution Width


 


 


 


 14.8 %


(11.5-14.5)


 


Platelet Count


 


 


 


 414 x10^3/uL


(140-400)


 


Neutrophils (%) (Auto)    86 % (31-73) 


 


Lymphocytes (%) (Auto)    7 % (24-48) 


 


Monocytes (%) (Auto)    6 % (0-9) 


 


Eosinophils (%) (Auto)    1 % (0-3) 


 


Basophils (%) (Auto)    0 % (0-3) 


 


Neutrophils # (Auto)


 


 


 


 12.9 x10^3/uL


(1.8-7.7)


 


Lymphocytes # (Auto)


 


 


 


 1.0 x10^3/uL


(1.0-4.8)


 


Monocytes # (Auto)


 


 


 


 0.9 x10^3/uL


(0.0-1.1)


 


Eosinophils # (Auto)


 


 


 


 0.2 x10^3/uL


(0.0-0.7)


 


Basophils # (Auto)


 


 


 


 0.0 x10^3/uL


(0.0-0.2)


 


Test


 7/7/20


05:32 7/7/20


12:21 


 





 


Glucose (Fingerstick)


 159 mg/dL


(70-99) 164 mg/dL


(70-99) 


 











Laboratory Tests








Test


 7/6/20


16:51 7/7/20


00:00 7/7/20


05:20 7/7/20


05:32


 


Glucose (Fingerstick)


 153 mg/dL


(70-99) 123 mg/dL


(70-99) 


 159 mg/dL


(70-99)


 


White Blood Count


 


 


 15.0 x10^3/uL


(4.0-11.0) 





 


Red Blood Count


 


 


 2.54 x10^6/uL


(3.50-5.40) 





 


Hemoglobin


 


 


 7.5 g/dL


(12.0-15.5) 





 


Hematocrit


 


 


 22.5 %


(36.0-47.0) 





 


Mean Corpuscular Volume   89 fL ()  


 


Mean Corpuscular Hemoglobin   30 pg (25-35)  


 


Mean Corpuscular Hemoglobin


Concent 


 


 33 g/dL


(31-37) 





 


Red Cell Distribution Width


 


 


 14.8 %


(11.5-14.5) 





 


Platelet Count


 


 


 414 x10^3/uL


(140-400) 





 


Neutrophils (%) (Auto)   86 % (31-73)  


 


Lymphocytes (%) (Auto)   7 % (24-48)  


 


Monocytes (%) (Auto)   6 % (0-9)  


 


Eosinophils (%) (Auto)   1 % (0-3)  


 


Basophils (%) (Auto)   0 % (0-3)  


 


Neutrophils # (Auto)


 


 


 12.9 x10^3/uL


(1.8-7.7) 





 


Lymphocytes # (Auto)


 


 


 1.0 x10^3/uL


(1.0-4.8) 





 


Monocytes # (Auto)


 


 


 0.9 x10^3/uL


(0.0-1.1) 





 


Eosinophils # (Auto)


 


 


 0.2 x10^3/uL


(0.0-0.7) 





 


Basophils # (Auto)


 


 


 0.0 x10^3/uL


(0.0-0.2) 





 


Test


 7/7/20


12:21 


 


 





 


Glucose (Fingerstick)


 164 mg/dL


(70-99) 


 


 











Microbiology


6/30/20 Gram Stain - Final, Complete


          


6/30/20 Aerobic and Anaerobic Culture - Final, Complete


          


6/30/20 Antimicrobic Susceptibility - Final, Complete


          


6/28/20 Blood Culture - Final, Complete


          NO GROWTH AFTER 5 DAYS


6/15/20 Gram Stain - Final, Complete


          


6/15/20 Aerobic and Anaerobic Culture - Final, Complete


          


6/13/20 Gram Stain Evaluation - Final, Complete


          


6/13/20 Respiratory Culture - Final, Complete


          


6/13/20 Antimicrobic Susceptibility - Final, Complete


          


6/7/20 Urine Culture - Final, Complete


         


5/30/20 Gram Stain - Final, Complete


          


5/30/20 Aerobic Culture - Final, Complete


Medications





Current Medications


Sodium Chloride 1,000 ml @  1,000 mls/hr Q1H IV  Last administered on 3/16/20at 

03:00;  Start 3/16/20 at 03:00;  Stop 3/16/20 at 03:59;  Status DC


Ondansetron HCl (Zofran) 4 mg 1X  ONCE IVP  Last administered on 3/16/20at 

03:27;  Start 3/16/20 at 03:00;  Stop 3/16/20 at 03:01;  Status DC


Morphine Sulfate (Morphine Sulfate) 4 mg 1X  ONCE IV ;  Start 3/16/20 at 03:00; 

Stop 3/16/20 at 03:01;  Status Cancel


Ketorolac Tromethamine (Toradol 30mg Vial) 30 mg 1X  ONCE IV  Last administered 

on 3/16/20at 02:54;  Start 3/16/20 at 03:00;  Stop 3/16/20 at 03:01;  Status DC


Fentanyl Citrate (Fentanyl 2ml Vial) 25 mcg 1X  ONCE IVP  Last administered on 

3/16/20at 03:23;  Start 3/16/20 at 03:30;  Stop 3/16/20 at 03:31;  Status DC


Fentanyl Citrate (Fentanyl 2ml Vial) 100 mcg STK-MED ONCE .ROUTE ;  Start 

3/16/20 at 03:18;  Stop 3/16/20 at 03:18;  Status DC


Iohexol (Omnipaque 350 Mg/ml) 90 ml 1X  ONCE IV  Last administered on 3/16/20at 

03:25;  Start 3/16/20 at 03:30;  Stop 3/16/20 at 03:31;  Status DC


Info (CONTRAST GIVEN -- Rx MONITORING) 1 each PRN DAILY  PRN MC SEE COMMENTS;  

Start 3/16/20 at 03:30;  Stop 3/18/20 at 03:29;  Status DC


Hydromorphone HCl (Dilaudid) 0.5 mg 1X  ONCE IV  Last administered on 3/16/20at 

03:55;  Start 3/16/20 at 04:30;  Stop 3/16/20 at 04:32;  Status DC


Ondansetron HCl (Zofran) 4 mg PRN Q8HRS  PRN IV NAUSEA/VOMITING 1ST CHOICE;  

Start 3/16/20 at 05:00;  Stop 3/16/20 at 09:27;  Status DC


Morphine Sulfate (Morphine Sulfate) 2 mg PRN Q2HR  PRN IV SEVERE PAIN 7-10 Last 

administered on 3/17/20at 12:26;  Start 3/16/20 at 05:00;  Stop 3/17/20 at 

14:15;  Status DC


Sodium Chloride 1,000 ml @  125 mls/hr Q8H IV  Last administered on 3/16/20at 

20:56;  Start 3/16/20 at 05:00;  Stop 3/17/20 at 04:59;  Status DC


Hydromorphone HCl (Dilaudid) 0.5 mg PRN Q3HRS  PRN IV SEVERE PAIN 7-10 Last 

administered on 3/17/20at 10:06;  Start 3/16/20 at 05:00;  Stop 3/17/20 at 

12:01;  Status DC


Piperacillin Sod/ Tazobactam Sod 4.5 gm/Sodium Chloride 100 ml @  200 mls/hr 1X 

ONCE IV  Last administered on 3/16/20at 05:44;  Start 3/16/20 at 06:00;  Stop 

3/16/20 at 06:29;  Status DC


Ondansetron HCl (Zofran) 4 mg PRN Q4HRS  PRN IV NAUSEA/VOMITING 1ST CHOICE Last 

administered on 6/27/20at 13:37;  Start 3/16/20 at 09:30


Insulin Human Lispro (HumaLOG) 0-9 UNITS Q6HRS SQ  Last administered on 7/7/20at

12:24;  Start 3/16/20 at 09:30


Dextrose (Dextrose 50%-Water Syringe) 12.5 gm PRN Q15MIN  PRN IV SEE COMMENTS;  

Start 3/16/20 at 09:30


Pantoprazole Sodium (PROTONIX VIAL for IV PUSH) 40 mg DAILYAC IVP  Last 

administered on 7/7/20at 08:32;  Start 3/16/20 at 11:30


Prochlorperazine Edisylate (Compazine) 10 mg PRN Q6HRS  PRN IV NAUSEA/VOMITING, 

2nd CHOICE Last administered on 6/27/20at 10:53;  Start 3/16/20 at 17:45


Atenolol (Tenormin) 100 mg DAILY PO ;  Start 3/17/20 at 09:00;  Stop 3/16/20 at 

20:08;  Status DC


Metoprolol Tartrate (Lopressor Vial) 2.5 mg Q6HRS IVP  Last administered on 

3/17/20at 05:51;  Start 3/16/20 at 20:15;  Stop 3/17/20 at 10:02;  Status DC


Metoprolol Tartrate (Lopressor Vial) 5 mg Q6HRS IVP  Last administered on 

3/26/20at 00:12;  Start 3/17/20 at 10:15;  Stop 3/28/20 at 08:48;  Status DC


Hydromorphone HCl (Dilaudid) 1 mg PRN Q3HRS  PRN IV SEVERE PAIN 7-10 Last a

dministered on 3/23/20at 05:13;  Start 3/17/20 at 12:00;  Stop 3/31/20 at 00:25;

 Status DC


Lidocaine HCl (Buffered Lidocaine 1%) 3 ml STK-MED ONCE .ROUTE ;  Start 3/17/20 

at 12:55;  Stop 3/17/20 at 12:56;  Status DC


Albumin Human 500 ml @  125 mls/hr 1X  ONCE IV  Last administered on 3/17/20at 

14:33;  Start 3/17/20 at 14:30;  Stop 3/17/20 at 18:32;  Status DC


Norepinephrine Bitartrate 8 mg/ Dextrose 258 ml @  17.299 mls/ hr CONT  PRN IV 

PER PROTOCOL Last administered on 4/14/20at 12:48;  Start 3/17/20 at 15:30;  

Stop 4/17/20 at 09:19;  Status DC


Sodium Chloride 1,000 ml @  125 mls/hr Q8H IV  Last administered on 3/17/20at 

21:04;  Start 3/17/20 at 16:00;  Stop 3/18/20 at 02:42;  Status DC


Albumin Human 500 ml @  125 mls/hr PRN BID  PRN IV After every 2L NSS & BP < 

90mm Last administered on 6/30/20at 16:06;  Start 3/17/20 at 16:00;  Stop 7/3/20

at 09:30;  Status DC


Iohexol (Omnipaque 300 Mg/ml) 60 ml 1X  ONCE IV  Last administered on 3/17/20at 

17:20;  Start 3/17/20 at 17:00;  Stop 3/17/20 at 17:01;  Status DC


Info (CONTRAST GIVEN -- Rx MONITORING) 1 each PRN DAILY  PRN MC SEE COMMENTS;  

Start 3/17/20 at 17:00;  Stop 3/19/20 at 16:59;  Status DC


Meropenem 1 gm/ Sodium Chloride 100 ml @  200 mls/hr Q8HRS IV  Last administered

on 3/18/20at 05:45;  Start 3/17/20 at 20:00;  Stop 3/18/20 at 08:48;  Status DC


Furosemide (Lasix) 40 mg 1X  ONCE IVP  Last administered on 3/17/20at 22:12;  

Start 3/17/20 at 22:30;  Stop 3/17/20 at 22:31;  Status DC


Calcium Chloride 1000 mg/Sodium Chloride 110 ml @  220 mls/hr 1X  ONCE IV  Last 

administered on 3/17/20at 22:11;  Start 3/17/20 at 22:30;  Stop 3/17/20 at 

22:59;  Status DC


Albuterol Sulfate (Ventolin Neb Soln) 2.5 mg 1X  ONCE NEB  Last administered on 

3/18/20at 00:56;  Start 3/17/20 at 22:30;  Stop 3/17/20 at 22:31;  Status DC


Insulin Human Regular (HumuLIN R VIAL) 5 unit 1X  ONCE IV  Last administered on 

3/17/20at 22:14;  Start 3/17/20 at 22:30;  Stop 3/17/20 at 22:31;  Status DC


Magnesium Sulfate 50 ml @ 25 mls/hr 1X  ONCE IV  Last administered on 3/18/20at 

02:57;  Start 3/18/20 at 03:00;  Stop 3/18/20 at 04:59;  Status DC


Calcium Gluconate 1000 mg/Sodium Chloride 110 ml @  220 mls/hr 1X  ONCE IV  Last

administered on 3/18/20at 02:46;  Start 3/18/20 at 03:00;  Stop 3/18/20 at 

03:29;  Status DC


Sodium Chloride 1,000 ml @  200 mls/hr Q5H IV  Last administered on 3/18/20at 

02:46;  Start 3/18/20 at 03:00;  Stop 3/18/20 at 10:21;  Status DC


Calcium Gluconate 1000 mg/Sodium Chloride 110 ml @  220 mls/hr 1X  ONCE IV  Last

administered on 3/18/20at 03:21;  Start 3/18/20 at 03:30;  Stop 3/18/20 at 

03:59;  Status DC


Sodium Bicarbonate 50 meq/Sodium Chloride 1,050 ml @  75 mls/hr Q14H IV  Last 

administered on 3/22/20at 21:10;  Start 3/18/20 at 07:30;  Stop 3/23/20 at 

10:28;  Status DC


Calcium Gluconate 2000 mg/Sodium Chloride 120 ml @  220 mls/hr 1X  ONCE IV  Last

administered on 3/18/20at 09:05;  Start 3/18/20 at 07:30;  Stop 3/18/20 at 

08:02;  Status DC


Lidocaine HCl (Xylocaine-Mpf 1% 2ml Vial) 2 ml STK-MED ONCE .ROUTE ;  Start 

3/18/20 at 08:47;  Stop 3/18/20 at 08:47;  Status DC


Meropenem 500 mg/ Sodium Chloride 50 ml @  100 mls/hr Q12HR IV  Last 

administered on 3/23/20at 21:01;  Start 3/18/20 at 18:00;  Stop 3/24/20 at 

07:58;  Status DC


Lidocaine HCl (Buffered Lidocaine 1%) 3 ml STK-MED ONCE .ROUTE ;  Start 3/18/20 

at 09:46;  Stop 3/18/20 at 09:46;  Status DC


Lidocaine HCl (Buffered Lidocaine 1%) 6 ml 1X  ONCE INJ  Last administered on 

3/18/20at 10:26;  Start 3/18/20 at 10:15;  Stop 3/18/20 at 10:16;  Status DC


Info (Tpn Per Pharmacy) 1 each PRN DAILY  PRN MC SEE COMMENTS Last administered 

on 7/7/20at 12:05;  Start 3/18/20 at 12:00


Sodium Chloride 1,000 ml @  1,000 mls/hr Q1H PRN IV hypotension;  Start 3/18/20 

at 12:07;  Stop 3/18/20 at 18:06;  Status DC


Diphenhydramine HCl (Benadryl) 25 mg 1X PRN  PRN IV ITCHING;  Start 3/18/20 at 

12:15;  Stop 3/19/20 at 12:14;  Status DC


Diphenhydramine HCl (Benadryl) 25 mg 1X PRN  PRN IV ITCHING;  Start 3/18/20 at 

12:15;  Stop 3/19/20 at 12:14;  Status DC


Sodium Chloride 1,000 ml @  400 mls/hr Q2H30M PRN IV PATENCY;  Start 3/18/20 at 

12:07;  Stop 3/19/20 at 00:06;  Status DC


Info (PHARMACY MONITORING -- do not chart) 1 each PRN DAILY  PRN MC SEE 

COMMENTS;  Start 3/18/20 at 12:15;  Stop 3/20/20 at 08:13;  Status DC


Sodium Chloride 90 meq/Calcium Gluconate 10 meq/ Multivitamins 10 ml/Chromium/ 

Copper/Manganese/ Seleni/Zn 1 ml/ Total Parenteral Nutrition/Amino 

Acids/Dextrose/ Fat Emulsion Intravenous 55.005 ml  @ 2.292 mls/hr TPN  CONT IV 

;  Start 3/18/20 at 22:00;  Stop 3/18/20 at 12:33;  Status DC


Info (Tpn Per Pharmacy) 1 each PRN DAILY  PRN MC SEE COMMENTS;  Start 3/18/20 at

12:30;  Status UNV


Sodium Chloride 90 meq/Calcium Gluconate 10 meq/ Multivitamins 10 ml/Chromium/ 

Copper/Manganese/ Seleni/Zn 0.5 ml/ Total Parenteral Nutrition/Amino 

Acids/Dextrose/ Fat Emulsion Intravenous 1,512 ml @  63 mls/hr TPN  CONT IV  

Last administered on 3/18/20at 22:06;  Start 3/18/20 at 22:00;  Stop 3/19/20 at 

21:59;  Status DC


Calcium Carbonate/ Glycine (Tums) 500 mg PRN AFTMEALHC  PRN PO INDIGESTION;  

Start 3/18/20 at 17:45;  Stop 5/13/20 at 10:25;  Status DC


Calcium Gluconate (Calcium Gluconate) 2,000 mg 1X  ONCE IVP  Last administered 

on 3/19/20at 02:19;  Start 3/19/20 at 02:15;  Stop 3/19/20 at 02:16;  Status DC


Calcium Chloride 3000 mg/Sodium Chloride 1,030 ml @  50 mls/hr [id number] IV  Last 

administered on 3/21/20at 02:17;  Start 3/19/20 at 08:00;  Stop 3/21/20 at 

15:23;  Status DC


Lorazepam (Ativan Inj) 1 mg PRN Q4HRS  PRN IVP ANXIETY / AGITATION, 2nd choic 

Last administered on 4/17/20at 03:51;  Start 3/19/20 at 09:00;  Stop 4/17/20 at 

09:19;  Status DC


Sodium Chloride 1,000 ml @  1,000 mls/hr Q1H PRN IV hypotension;  Start 3/19/20 

at 08:56;  Stop 3/19/20 at 14:55;  Status DC


Albumin Human 200 ml @  200 mls/hr 1X PRN  PRN IV Hypotension;  Start 3/19/20 at

09:00;  Stop 3/19/20 at 14:59;  Status DC


Diphenhydramine HCl (Benadryl) 25 mg 1X PRN  PRN IV ITCHING;  Start 3/19/20 at 

09:00;  Stop 3/20/20 at 08:59;  Status DC


Diphenhydramine HCl (Benadryl) 25 mg 1X PRN  PRN IV ITCHING;  Start 3/19/20 at 

09:00;  Stop 3/20/20 at 08:59;  Status DC


Sodium Chloride 1,000 ml @  400 mls/hr Q2H30M PRN IV PATENCY;  Start 3/19/20 at 

08:56;  Stop 3/19/20 at 20:55;  Status DC


Info (PHARMACY MONITORING -- do not chart) 1 each PRN DAILY  PRN MC SEE 

COMMENTS;  Start 3/19/20 at 09:00;  Status UNV


Info (PHARMACY MONITORING -- do not chart) 1 each PRN DAILY  PRN MC SEE 

COMMENTS;  Start 3/19/20 at 09:00;  Stop 3/20/20 at 08:13;  Status DC


Digoxin (Lanoxin) 500 mcg 1X  ONCE IV  Last administered on 3/19/20at 10:04;  

Start 3/19/20 at 10:00;  Stop 3/19/20 at 10:01;  Status DC


Digoxin (Lanoxin) 125 mcg 1X  ONCE IV  Last administered on 3/19/20at 17:10;  

Start 3/19/20 at 18:00;  Stop 3/19/20 at 18:01;  Status DC


Magnesium Sulfate 100 ml @  25 mls/hr 1X  ONCE IV  Last administered on 

3/19/20at 12:48;  Start 3/19/20 at 13:00;  Stop 3/19/20 at 16:59;  Status DC


Sodium Chloride 90 meq/Magnesium Sulfate 10 meq/ Calcium Gluconate 20 meq/ 

Multivitamins 10 ml/Chromium/ Copper/Manganese/ Seleni/Zn 0.5 ml/ Total 

Parenteral Nutrition/Amino Acids/Dextrose/ Fat Emulsion Intravenous 1,512 ml @  

63 mls/hr TPN  CONT IV  Last administered on 3/19/20at 22:25;  Start 3/19/20 at 

22:00;  Stop 3/20/20 at 21:59;  Status DC


Sodium Chloride 1,000 ml @  1,000 mls/hr Q1H PRN IV hypotension;  Start 3/20/20 

at 08:05;  Stop 3/20/20 at 14:04;  Status DC


Albumin Human 200 ml @  200 mls/hr 1X  ONCE IV  Last administered on 3/20/20at 

08:57;  Start 3/20/20 at 08:15;  Stop 3/20/20 at 09:14;  Status DC


Diphenhydramine HCl (Benadryl) 25 mg 1X PRN  PRN IV ITCHING;  Start 3/20/20 at 

08:15;  Stop 3/21/20 at 08:14;  Status DC


Diphenhydramine HCl (Benadryl) 25 mg 1X PRN  PRN IV ITCHING;  Start 3/20/20 at 

08:15;  Stop 3/21/20 at 08:14;  Status DC


Sodium Chloride 1,000 ml @  400 mls/hr Q2H30M PRN IV PATENCY;  Start 3/20/20 at 

08:05;  Stop 3/20/20 at 20:04;  Status DC


Info (PHARMACY MONITORING -- do not chart) 1 each PRN DAILY  PRN MC SEE 

COMMENTS;  Start 3/20/20 at 08:15;  Stop 3/24/20 at 07:57;  Status DC


Sodium Chloride 90 meq/Potassium Chloride 15 meq/ Potassium Phosphate 10 mmol/ 

Magnesium Sulfate 10 meq/Calcium Gluconate 20 meq/ Multivitamins 10 ml/Chromium/

Copper/Manganese/ Seleni/Zn 0.5 ml/ Total Parenteral Nutrition/Amino 

Acids/Dextrose/ Fat Emulsion Intravenous 1,512 ml @  63 mls/hr TPN  CONT IV  

Last administered on 3/20/20at 21:01;  Start 3/20/20 at 22:00;  Stop 3/21/20 at 

21:59;  Status DC


Potassium Chloride/Water 100 ml @  100 mls/hr 1X  ONCE IV  Last administered on 

3/20/20at 14:09;  Start 3/20/20 at 14:00;  Stop 3/20/20 at 14:59;  Status DC


Benzocaine (Hurricaine One) 1 spray 1X  ONCE MM  Last administered on 3/20/20at 

16:38;  Start 3/20/20 at 14:30;  Stop 3/20/20 at 14:31;  Status DC


Lidocaine HCl (Glydo (Lidocaine) Jelly) 1 thomas 1X  ONCE MM  Last administered on 

3/20/20at 16:38;  Start 3/20/20 at 14:30;  Stop 3/20/20 at 14:31;  Status DC


Linezolid/Dextrose 300 ml @  300 mls/hr Q12HR IV  Last administered on 3/26/20at

21:04;  Start 3/20/20 at 20:00;  Stop 3/27/20 at 07:50;  Status DC


Acetaminophen (Tylenol) 650 mg PRN Q6HRS  PRN PO MILD PAIN / TEMP;  Start 

3/21/20 at 03:30;  Stop 3/21/20 at 03:36;  Status DC


Acetaminophen (Tylenol) 650 mg PRN Q6HRS  PRN PEG MILD PAIN / TEMP Last 

administered on 4/16/20at 19:56;  Start 3/21/20 at 03:36;  Stop 5/13/20 at 

10:25;  Status DC


Sodium Chloride 1,000 ml @  1,000 mls/hr Q1H PRN IV hypotension;  Start 3/21/20 

at 07:50;  Stop 3/21/20 at 13:49;  Status DC


Albumin Human 200 ml @  200 mls/hr 1X PRN  PRN IV Hypotension;  Start 3/21/20 at

08:00;  Stop 3/21/20 at 13:59;  Status DC


Sodium Chloride (Normal Saline Flush) 10 ml 1X PRN  PRN IV AP catheter pack;  

Start 3/21/20 at 08:00;  Stop 3/22/20 at 07:59;  Status DC


Sodium Chloride (Normal Saline Flush) 10 ml 1X PRN  PRN IV  catheter pack;  

Start 3/21/20 at 08:00;  Stop 3/22/20 at 07:59;  Status DC


Sodium Chloride 1,000 ml @  400 mls/hr Q2H30M PRN IV PATENCY;  Start 3/21/20 at 

07:50;  Stop 3/21/20 at 19:49;  Status DC


Info (PHARMACY MONITORING -- do not chart) 1 each PRN DAILY  PRN MC SEE 

COMMENTS;  Start 3/21/20 at 08:00;  Status UNV


Info (PHARMACY MONITORING -- do not chart) 1 each PRN DAILY  PRN MC SEE 

COMMENTS;  Start 3/21/20 at 08:00;  Stop 3/23/20 at 08:25;  Status DC


Sodium Chloride 90 meq/Potassium Chloride 15 meq/ Potassium Phosphate 10 mmol/ 

Magnesium Sulfate 10 meq/Calcium Gluconate 20 meq/ Multivitamins 10 ml/Chromium/

Copper/Manganese/ Seleni/Zn 0.5 ml/ Total Parenteral Nutrition/Amino 

Acids/Dextrose/ Fat Emulsion Intravenous 1,512 ml @  63 mls/hr TPN  CONT IV  

Last administered on 3/21/20at 20:57;  Start 3/21/20 at 22:00;  Stop 3/22/20 at 

21:59;  Status DC


Sodium Chloride 90 meq/Potassium Chloride 15 meq/ Potassium Phosphate 15 mmol/ 

Magnesium Sulfate 10 meq/Calcium Gluconate 20 meq/ Multivitamins 10 ml/Chromium/

Copper/Manganese/ Seleni/Zn 0.5 ml/ Total Parenteral Nutrition/Amino Aci

ds/Dextrose/ Fat Emulsion Intravenous 1,512 ml @  63 mls/hr TPN  CONT IV ;  

Start 3/22/20 at 22:00;  Stop 3/22/20 at 14:16;  Status DC


Sodium Chloride 90 meq/Potassium Chloride 15 meq/ Potassium Phosphate 15 mmol/ 

Magnesium Sulfate 10 meq/Calcium Gluconate 20 meq/ Multivitamins 10 ml/Chromium/

Copper/Manganese/ Seleni/Zn 0.5 ml/ Total Parenteral Nutrition/Amino 

Acids/Dextrose/ Fat Emulsion Intravenous 1,200 ml @  50 mls/hr TPN  CONT IV ;  

Start 3/22/20 at 22:00;  Stop 3/22/20 at 14:17;  Status DC


Sodium Chloride 90 meq/Potassium Chloride 15 meq/ Potassium Phosphate 10 mmol/ 

Magnesium Sulfate 10 meq/Calcium Gluconate 20 meq/ Multivitamins 10 ml/Chromium/

Copper/Manganese/ Seleni/Zn 0.5 ml/ Total Parenteral Nutrition/Amino 

Acids/Dextrose/ Fat Emulsion Intravenous 1,200 ml @  50 mls/hr TPN  CONT IV  

Last administered on 3/22/20at 23:29;  Start 3/22/20 at 22:00;  Stop 3/23/20 at 

21:59;  Status DC


Sodium Chloride 1,000 ml @  1,000 mls/hr Q1H PRN IV hypotension;  Start 3/23/20 

at 07:28;  Stop 3/23/20 at 13:27;  Status DC


Albumin Human 200 ml @  200 mls/hr 1X  ONCE IV  Last administered on 3/23/20at 

08:51;  Start 3/23/20 at 07:30;  Stop 3/23/20 at 08:29;  Status DC


Diphenhydramine HCl (Benadryl) 25 mg 1X PRN  PRN IV ITCHING;  Start 3/23/20 at 

07:30;  Stop 3/24/20 at 07:29;  Status DC


Diphenhydramine HCl (Benadryl) 25 mg 1X PRN  PRN IV ITCHING;  Start 3/23/20 at 

07:30;  Stop 3/24/20 at 07:29;  Status DC


Sodium Chloride 1,000 ml @  400 mls/hr Q2H30M PRN IV PATENCY;  Start 3/23/20 at 

07:28;  Stop 3/23/20 at 19:27;  Status DC


Info (PHARMACY MONITORING -- do not chart) 1 each PRN DAILY  PRN MC SEE 

COMMENTS;  Start 3/23/20 at 07:30;  Stop 4/3/20 at 13:01;  Status DC


Metronidazole 100 ml @  100 mls/hr Q6HRS IV  Last administered on 4/8/20at 

06:26;  Start 3/23/20 at 08:30;  Stop 4/8/20 at 09:58;  Status DC


Micafungin Sodium 100 mg/Dextrose 100 ml @  100 mls/hr Q24H IV  Last 

administered on 4/30/20at 08:18;  Start 3/23/20 at 09:00;  Stop 4/30/20 at 

20:58;  Status DC


Propofol 0 ml @ As Directed STK-MED ONCE IV ;  Start 3/23/20 at 07:53;  Stop 

3/23/20 at 07:53;  Status DC


Etomidate (Amidate) 20 mg STK-MED ONCE IV ;  Start 3/23/20 at 07:53;  Stop 

3/23/20 at 07:54;  Status DC


Midazolam HCl (Versed) 5 mg STK-MED ONCE .ROUTE ;  Start 3/23/20 at 07:57;  Stop

3/23/20 at 07:57;  Status DC


Fentanyl Citrate 30 ml @ 0 mls/hr CONT  PRN IV SEE PROTOCOL Last administered on

4/17/20at 06:12;  Start 3/23/20 at 08:15;  Stop 4/17/20 at 09:19;  Status DC


Artificial Tears (Artificial Tears) 1 drop PRN Q1HR  PRN OU DRY EYE, 1st choice;

 Start 3/23/20 at 08:15;  Stop 4/29/20 at 05:31;  Status DC


Midazolam HCl 50 mg/Sodium Chloride 50 ml @ 0 mls/hr CONT  PRN IV SEE PROTOCOL 

Last administered on 3/26/20at 22:39;  Start 3/23/20 at 08:15;  Stop 3/28/20 at 

15:59;  Status DC


Etomidate (Amidate) 8 mg 1X  ONCE IV  Last administered on 3/23/20at 08:33;  

Start 3/23/20 at 08:30;  Stop 3/23/20 at 08:31;  Status DC


Succinylcholine Chloride (Anectine) 120 mg 1X  ONCE IV  Last administered on 

3/23/20at 08:34;  Start 3/23/20 at 08:30;  Stop 3/23/20 at 08:31;  Status DC


Midazolam HCl (Versed) 5 mg 1X  ONCE IV ;  Start 3/23/20 at 08:30;  Stop 3/23/20

at 08:31;  Status DC


Potassium Chloride 15 meq/ Bicarbonate Dialysis Soln w/ out KCl 5,007.5 ml  @ 

1,000 mls/ hr Q5H1M IV  Last administered on 3/24/20at 11:11;  Start 3/23/20 at 

12:00;  Stop 3/24/20 at 11:15;  Status DC


Potassium Chloride 15 meq/ Bicarbonate Dialysis Soln w/ out KCl 5,007.5 ml  @ 

1,000 mls/ hr Q5H1M IV  Last administered on 3/24/20at 11:12;  Start 3/23/20 at 

12:00;  Stop 3/24/20 at 11:17;  Status DC


Potassium Chloride 15 meq/ Bicarbonate Dialysis Soln w/ out KCl 5,007.5 ml  @ 

1,000 mls/ hr Q5H1M IV  Last administered on 3/24/20at 11:11;  Start 3/23/20 at 

12:00;  Stop 3/24/20 at 11:19;  Status DC


Sodium Chloride 90 meq/Potassium Chloride 15 meq/ Potassium Phosphate 10 mmol/ 

Magnesium Sulfate 10 meq/Calcium Gluconate 20 meq/ Multivitamins 10 ml/Chromium/

Copper/Manganese/ Seleni/Zn 0.5 ml/ Total Parenteral Nutrition/Amino 

Acids/Dextrose/ Fat Emulsion Intravenous 1,400 ml @  58.333 mls/ hr TPN  CONT IV

 Last administered on 3/23/20at 21:42;  Start 3/23/20 at 22:00;  Stop 3/24/20 at

21:59;  Status DC


Heparin Sodium (Porcine) (Heparin Sodium) 5,000 unit Q8HRS SQ  Last administered

on 3/28/20at 05:55;  Start 3/23/20 at 15:00;  Stop 3/28/20 at 13:28;  Status DC


Meropenem 500 mg/ Sodium Chloride 50 ml @  100 mls/hr Q6HRS IV  Last 

administered on 3/25/20at 06:00;  Start 3/24/20 at 09:00;  Stop 3/25/20 at 

07:29;  Status DC


Potassium Phosphate 20 mmol/ Sodium Chloride 106.6667 ml @  51.667 m... 1X  ONCE

IV  Last administered on 3/24/20at 11:22;  Start 3/24/20 at 10:15;  Stop 3/24/20

at 12:18;  Status DC


Acetaminophen (Tylenol Supp) 650 mg PRN Q6HRS  PRN MA MILD PAIN / TEMP > 100.3'F

Last administered on 6/29/20at 18:16;  Start 3/24/20 at 10:30


Potassium Chloride/Water 100 ml @  100 mls/hr Q1H IV  Last administered on 

3/24/20at 12:12;  Start 3/24/20 at 11:00;  Stop 3/24/20 at 12:59;  Status DC


Potassium Chloride 20 meq/ Bicarbonate Dialysis Soln w/ out KCl 5,010 ml @  

1,000 mls/hr Q5H1M IV  Last administered on 3/25/20at 08:48;  Start 3/24/20 at 

12:00;  Stop 3/25/20 at 13:03;  Status DC


Potassium Chloride 20 meq/ Bicarbonate Dialysis Soln w/ out KCl 5,010 ml @  

1,000 mls/hr Q5H1M IV  Last administered on 3/29/20at 14:52;  Start 3/24/20 at 

11:30;  Stop 3/29/20 at 19:59;  Status DC


Potassium Chloride 20 meq/ Bicarbonate Dialysis Soln w/ out KCl 5,010 ml @  

1,000 mls/hr Q5H1M IV  Last administered on 3/29/20at 14:53;  Start 3/24/20 at 

11:30;  Stop 3/29/20 at 19:59;  Status DC


Sodium Chloride 90 meq/Potassium Chloride 15 meq/ Potassium Phosphate 15 mmol/ 

Magnesium Sulfate 10 meq/Calcium Gluconate 15 meq/ Multivitamins 10 ml/Chromium/

Copper/Manganese/ Seleni/Zn 0.5 ml/ Total Parenteral Nutrition/Amino 

Acids/Dextrose/ Fat Emulsion Intravenous 1,400 ml @  58.333 mls/ hr TPN  CONT IV

 Last administered on 3/24/20at 22:17;  Start 3/24/20 at 22:00;  Stop 3/25/20 at

21:59;  Status DC


Cefepime HCl (Maxipime) 2 gm Q12HR IVP  Last administered on 4/7/20at 20:56;  

Start 3/25/20 at 09:00;  Stop 4/8/20 at 09:58;  Status DC


Daptomycin 500 mg/ Sodium Chloride 50 ml @  100 mls/hr Q48H IV  Last 

administered on 4/10/20at 09:57;  Start 3/25/20 at 08:30;  Stop 4/10/20 at 

10:07;  Status DC


Lidocaine HCl (Buffered Lidocaine 1%) 3 ml 1X  ONCE INJ  Last administered on 

3/25/20at 10:27;  Start 3/25/20 at 10:30;  Stop 3/25/20 at 10:31;  Status DC


Potassium Phosphate 20 mmol/ Sodium Chloride 106.6667 ml @  51.667 m... 1X  ONCE

IV  Last administered on 3/25/20at 12:51;  Start 3/25/20 at 13:00;  Stop 3/25/20

at 15:03;  Status DC


Sodium Chloride 90 meq/Potassium Chloride 15 meq/ Potassium Phosphate 18 mmol/ 

Magnesium Sulfate 8 meq/Calcium Gluconate 15 meq/ Multivitamins 10 ml/Chromium/ 

Copper/Manganese/ Seleni/Zn 0.5 ml/ Total Parenteral Nutrition/Amino 

Acids/Dextrose/ Fat Emulsion Intravenous 1,400 ml @  58.333 mls/ hr TPN  CONT IV

 Last administered on 3/25/20at 22:16;  Start 3/25/20 at 22:00;  Stop 3/26/20 at

21:59;  Status DC


Potassium Chloride 20 meq/ Bicarbonate Dialysis Soln w/ out KCl 5,010 ml @  

1,000 mls/hr Q5H1M IV  Last administered on 3/29/20at 14:54;  Start 3/25/20 at 

16:00;  Stop 3/29/20 at 19:59;  Status DC


Multi-Ingred Cream/Lotion/Oil/ Oint (Artificial Tears Eye Ointment) 1 thomas PRN 

Q1HR  PRN OU DRY EYE, 2nd choice Last administered on 4/13/20at 08:19;  Start 

3/25/20 at 17:30;  Stop 6/3/20 at 14:39;  Status DC


Sodium Chloride 90 meq/Potassium Chloride 15 meq/ Potassium Phosphate 18 mmol/ 

Magnesium Sulfate 8 meq/Calcium Gluconate 15 meq/ Multivitamins 10 ml/Chromium/ 

Copper/Manganese/ Seleni/Zn 0.5 ml/ Total Parenteral Nutrition/Amino 

Acids/Dextrose/ Fat Emulsion Intravenous 1,400 ml @  58.333 mls/ hr TPN  CONT IV

 Last administered on 3/26/20at 22:00;  Start 3/26/20 at 22:00;  Stop 3/27/20 at

21:59;  Status DC


Albumin Human 500 ml @  125 mls/hr 1X  ONCE IV ;  Start 3/26/20 at 14:15;  Stop 

3/26/20 at 18:14;  Status DC


Sodium Chloride 90 meq/Potassium Chloride 15 meq/ Potassium Phosphate 18 mmol/ 

Magnesium Sulfate 8 meq/Calcium Gluconate 15 meq/ Multivitamins 10 ml/Chromium/ 

Copper/Manganese/ Seleni/Zn 0.5 ml/ Insulin Human Regular 10 unit/ Total 

Parenteral Nutrition/Amino Acids/Dextrose/ Fat Emulsion Intravenous 1,400 ml @  

58.333 mls/ hr TPN  CONT IV  Last administered on 3/27/20at 21:43;  Start 

3/27/20 at 22:00;  Stop 3/28/20 at 21:59;  Status DC


Lidocaine HCl (Buffered Lidocaine 1%) 3 ml STK-MED ONCE .ROUTE ;  Start 3/25/20 

at 10:00;  Stop 3/27/20 at 13:57;  Status DC


Midazolam HCl 100 mg/Sodium Chloride 100 ml @ 7 mls/hr CONT  PRN IV SEE PROTOCOL

Last administered on 4/8/20at 15:35;  Start 3/28/20 at 16:00;  Stop 6/3/20 at 

14:38;  Status DC


Sodium Chloride 90 meq/Potassium Chloride 15 meq/ Potassium Phosphate 18 mmol/ 

Magnesium Sulfate 8 meq/Calcium Gluconate 15 meq/ Multivitamins 10 ml/Chromium/ 

Copper/Manganese/ Seleni/Zn 0.5 ml/ Insulin Human Regular 15 unit/ Total 

Parenteral Nutrition/Amino Acids/Dextrose/ Fat Emulsion Intravenous 1,400 ml @  

58.333 mls/ hr TPN  CONT IV  Last administered on 3/28/20at 20:34;  Start 

3/28/20 at 22:00;  Stop 3/29/20 at 21:59;  Status DC


Info ([facility] Electrolyte Protocol) 1 ea CONT PRN  PRN MC PER PROTOCOL;  Start 

3/29/20 at 13:15


Sodium Chloride 90 meq/Potassium Chloride 15 meq/ Potassium Phosphate 18 mmol/ 

Magnesium Sulfate 8 meq/Calcium Gluconate 15 meq/ Multivitamins 10 ml/Chromium/ 

Copper/Manganese/ Seleni/Zn 0.5 ml/ Insulin Human Regular 15 unit/ Total 

Parenteral Nutrition/Amino Acids/Dextrose/ Fat Emulsion Intravenous 1,400 ml @  

58.333 mls/ hr TPN  CONT IV  Last administered on 3/29/20at 22:05;  Start 

3/29/20 at 22:00;  Stop 3/30/20 at 21:59;  Status DC


Potassium Chloride 15 meq/ Bicarbonate Dialysis Soln w/ out KCl 5,007.5 ml  @ 

1,000 mls/ hr Q5H1M IV  Last administered on 4/1/20at 18:14;  Start 3/29/20 at 

20:00;  Stop 4/2/20 at 13:08;  Status DC


Potassium Chloride 15 meq/ Bicarbonate Dialysis Soln w/ out KCl 5,007.5 ml  @ 

1,000 mls/ hr Q5H1M IV  Last administered on 4/1/20at 18:14;  Start 3/29/20 at 

20:00;  Stop 4/2/20 at 13:08;  Status DC


Potassium Chloride 15 meq/ Bicarbonate Dialysis Soln w/ out KCl 5,007.5 ml  @ 

1,000 mls/ hr Q5H1M IV  Last administered on 4/1/20at 18:14;  Start 3/29/20 at 

20:00;  Stop 4/2/20 at 13:08;  Status DC


Iohexol (Omnipaque 240 Mg/ml) 30 ml 1X  ONCE PO  Last administered on 3/30/20at 

11:30;  Start 3/30/20 at 11:30;  Stop 3/30/20 at 11:33;  Status DC


Info (CONTRAST GIVEN -- Rx MONITORING) 1 each PRN DAILY  PRN MC SEE COMMENTS;  

Start 3/30/20 at 11:45;  Stop 4/1/20 at 11:44;  Status DC


Sodium Chloride 90 meq/Potassium Chloride 15 meq/ Potassium Phosphate 18 mmol/ 

Magnesium Sulfate 8 meq/Calcium Gluconate 15 meq/ Multivitamins 10 ml/Chromium/ 

Copper/Manganese/ Seleni/Zn 0.5 ml/ Insulin Human Regular 15 unit/ Total 

Parenteral Nutrition/Amino Acids/Dextrose/ Fat Emulsion Intravenous 1,400 ml @  

58.333 mls/ hr TPN  CONT IV  Last administered on 3/30/20at 21:47;  Start 

3/30/20 at 22:00;  Stop 3/31/20 at 21:59;  Status DC


Sodium Chloride 90 meq/Potassium Chloride 15 meq/ Potassium Phosphate 18 mmol/ 

Magnesium Sulfate 8 meq/Calcium Gluconate 15 meq/ Multivitamins 10 ml/Chromium/ 

Copper/Manganese/ Seleni/Zn 0.5 ml/ Insulin Human Regular 20 unit/ Total 

Parenteral Nutrition/Amino Acids/Dextrose/ Fat Emulsion Intravenous 1,400 ml @  

58.333 mls/ hr TPN  CONT IV  Last administered on 3/31/20at 21:36;  Start 

3/31/20 at 22:00;  Stop 4/1/20 at 21:59;  Status DC


Alteplase, Recombinant (Cathflo For Central Catheter Clearance) 1 mg 1X  ONCE 

INT CAT  Last administered on 3/31/20at 20:03;  Start 3/31/20 at 19:30;  Stop 

3/31/20 at 19:46;  Status DC


Alteplase, Recombinant (Cathflo For Central Catheter Clearance) 1 mg 1X  ONCE 

INT CAT  Last administered on 3/31/20at 22:05;  Start 3/31/20 at 22:00;  Stop 

3/31/20 at 22:01;  Status DC


Sodium Chloride 90 meq/Potassium Chloride 15 meq/ Potassium Phosphate 18 mmol/ 

Magnesium Sulfate 8 meq/Calcium Gluconate 15 meq/ Multivitamins 10 ml/Chromium/ 

Copper/Manganese/ Seleni/Zn 0.5 ml/ Insulin Human Regular 20 unit/ Total 

Parenteral Nutrition/Amino Acids/Dextrose/ Fat Emulsion Intravenous 1,400 ml @  

58.333 mls/ hr TPN  CONT IV  Last administered on 4/1/20at 21:30;  Start 4/1/20 

at 22:00;  Stop 4/2/20 at 21:59;  Status DC


Dexmedetomidine HCl 400 mcg/ Sodium Chloride 100 ml @ 0 mls/hr CONT  PRN IV 

ANXIETY / AGITATION Last administered on 5/30/20at 12:57;  Start 4/2/20 at 

08:15;  Stop 5/30/20 at 18:31;  Status DC


Sodium Chloride 500 ml @  500 mls/hr 1X PRN  PRN IV ELEVATED BP, SEE COMMENTS;  

Start 4/2/20 at 08:15


Atropine Sulfate (ATROPINE 0.5mg SYRINGE) 0.5 mg PRN Q5MIN  PRN IV SEE COMMENTS;

 Start 4/2/20 at 08:15


Furosemide (Lasix) 20 mg 1X  ONCE IVP  Last administered on 4/2/20at 08:19;  

Start 4/2/20 at 08:15;  Stop 4/2/20 at 08:16;  Status DC


Lidocaine HCl (Buffered Lidocaine 1%) 3 ml STK-MED ONCE .ROUTE ;  Start 4/2/20 

at 08:39;  Stop 4/2/20 at 08:39;  Status DC


Lidocaine HCl (Buffered Lidocaine 1%) 6 ml 1X  ONCE INJ  Last administered on 

4/2/20at 09:05;  Start 4/2/20 at 09:00;  Stop 4/2/20 at 09:06;  Status DC


Sodium Chloride 90 meq/Potassium Chloride 15 meq/ Potassium Phosphate 18 mmol/ 

Magnesium Sulfate 8 meq/Calcium Gluconate 15 meq/ Multivitamins 10 ml/Chromium/ 

Copper/Manganese/ Seleni/Zn 0.5 ml/ Insulin Human Regular 20 unit/ Total 

Parenteral Nutrition/Amino Acids/Dextrose/ Fat Emulsion Intravenous 1,400 ml @  

58.333 mls/ hr TPN  CONT IV  Last administered on 4/2/20at 22:45;  Start 4/2/20 

at 22:00;  Stop 4/3/20 at 21:59;  Status DC


Sodium Chloride 1,000 ml @  1,000 mls/hr Q1H PRN IV hypotension;  Start 4/3/20 

at 07:30;  Stop 4/3/20 at 13:29;  Status DC


Albumin Human 200 ml @  200 mls/hr 1X PRN  PRN IV Hypotension Last administered 

on 4/3/20at 09:36;  Start 4/3/20 at 07:30;  Stop 4/3/20 at 13:29;  Status DC


Sodium Chloride (Normal Saline Flush) 10 ml 1X PRN  PRN IV AP catheter pack;  S

tart 4/3/20 at 07:30;  Stop 4/3/20 at 21:29;  Status DC


Sodium Chloride (Normal Saline Flush) 10 ml 1X PRN  PRN IV  catheter pack;  

Start 4/3/20 at 07:30;  Stop 4/4/20 at 07:29;  Status DC


Sodium Chloride 1,000 ml @  400 mls/hr Q2H30M PRN IV PATENCY;  Start 4/3/20 at 

07:30;  Stop 4/3/20 at 19:29;  Status DC


Info (PHARMACY MONITORING -- do not chart) 1 each PRN DAILY  PRN MC SEE C

OMMENTS;  Start 4/3/20 at 07:30;  Stop 4/3/20 at 13:02;  Status DC


Info (PHARMACY MONITORING -- do not chart) 1 each PRN DAILY  PRN MC SEE 

COMMENTS;  Start 4/3/20 at 07:30;  Stop 4/5/20 at 12:45;  Status DC


Sodium Chloride 90 meq/Potassium Chloride 15 meq/ Potassium Phosphate 10 mmol/ 

Magnesium Sulfate 8 meq/Calcium Gluconate 15 meq/ Multivitamins 10 ml/Chromium/ 

Copper/Manganese/ Seleni/Zn 0.5 ml/ Insulin Human Regular 25 unit/ Total 

Parenteral Nutrition/Amino Acids/Dextrose/ Fat Emulsion Intravenous 1,400 ml @  

58.333 mls/ hr TPN  CONT IV  Last administered on 4/3/20at 22:19;  Start 4/3/20 

at 22:00;  Stop 4/4/20 at 21:59;  Status DC


Heparin Sodium (Porcine) (Heparin Sodium) 5,000 unit Q12HR SQ  Last administered

on 4/26/20at 08:59;  Start 4/3/20 at 21:00;  Stop 4/26/20 at 10:05;  Status DC


Ondansetron HCl (Zofran) 4 mg PRN Q6HRS  PRN IV NAUSEA/VOMITING;  Start 4/6/20 

at 07:00;  Stop 4/7/20 at 06:59;  Status DC


Fentanyl Citrate (Fentanyl 2ml Vial) 25 mcg PRN Q5MIN  PRN IV MILD PAIN 1-3;  

Start 4/6/20 at 07:00;  Stop 4/7/20 at 06:59;  Status DC


Fentanyl Citrate (Fentanyl 2ml Vial) 50 mcg PRN Q5MIN  PRN IV MODERATE TO SEVERE

PAIN;  Start 4/6/20 at 07:00;  Stop 4/7/20 at 06:59;  Status DC


Ringer's Solution 1,000 ml @  30 mls/hr Q24H IV ;  Start 4/6/20 at 07:00;  Stop 

4/6/20 at 18:59;  Status DC


Lidocaine HCl (Xylocaine-Mpf 1% 2ml Vial) 2 ml PRN 1X  PRN ID PRIOR TO IV START;

 Start 4/6/20 at 07:00;  Stop 4/7/20 at 06:59;  Status DC


Prochlorperazine Edisylate (Compazine) 5 mg PACU PRN  PRN IV NAUSEA, MRX1;  

Start 4/6/20 at 07:00;  Stop 4/7/20 at 06:59;  Status DC


Sodium Chloride 1,000 ml @  1,000 mls/hr Q1H PRN IV hypotension;  Start 4/4/20 

at 09:10;  Stop 4/4/20 at 15:09;  Status DC


Albumin Human 200 ml @  200 mls/hr 1X PRN  PRN IV Hypotension Last administered 

on 4/4/20at 10:10;  Start 4/4/20 at 09:15;  Stop 4/4/20 at 15:14;  Status DC


Sodium Chloride 1,000 ml @  400 mls/hr Q2H30M PRN IV PATENCY;  Start 4/4/20 at 

09:10;  Stop 4/4/20 at 21:09;  Status DC


Info (PHARMACY MONITORING -- do not chart) 1 each PRN DAILY  PRN MC SEE 

COMMENTS;  Start 4/4/20 at 09:15;  Stop 4/5/20 at 12:45;  Status DC


Info (PHARMACY MONITORING -- do not chart) 1 each PRN DAILY  PRN MC SEE 

COMMENTS;  Start 4/4/20 at 09:15;  Stop 4/5/20 at 12:45;  Status DC


Sodium Chloride 90 meq/Potassium Chloride 15 meq/ Potassium Phosphate 10 mmol/ 

Magnesium Sulfate 8 meq/Calcium Gluconate 15 meq/ Multivitamins 10 ml/Chromium/ 

Copper/Manganese/ Seleni/Zn 0.5 ml/ Insulin Human Regular 25 unit/ Total 

Parenteral Nutrition/Amino Acids/Dextrose/ Fat Emulsion Intravenous 1,400 ml @  

58.333 mls/ hr TPN  CONT IV  Last administered on 4/4/20at 22:10;  Start 4/4/20 

at 22:00;  Stop 4/5/20 at 21:59;  Status DC


Magnesium Sulfate 50 ml @ 25 mls/hr PRN DAILY  PRN IV for Mag < 1.7 on am labs 

Last administered on 6/18/20at 10:57;  Start 4/5/20 at 09:15


Sodium Chloride 90 meq/Potassium Chloride 15 meq/ Potassium Phosphate 10 mmol/ 

Magnesium Sulfate 8 meq/Calcium Gluconate 15 meq/ Multivitamins 10 ml/Chromium/ 

Copper/Manganese/ Seleni/Zn 0.5 ml/ Insulin Human Regular 25 unit/ Total 

Parenteral Nutrition/Amino Acids/Dextrose/ Fat Emulsion Intravenous 1,400 ml @  

58.333 mls/ hr TPN  CONT IV  Last administered on 4/5/20at 21:20;  Start 4/5/20 

at 22:00;  Stop 4/6/20 at 21:59;  Status DC


Sodium Chloride 1,000 ml @  1,000 mls/hr Q1H PRN IV hypotension;  Start 4/5/20 

at 12:23;  Stop 4/5/20 at 18:22;  Status DC


Albumin Human 200 ml @  200 mls/hr 1X  ONCE IV  Last administered on 4/5/20at 

13:34;  Start 4/5/20 at 12:30;  Stop 4/5/20 at 13:29;  Status DC


Diphenhydramine HCl (Benadryl) 25 mg 1X PRN  PRN IV ITCHING;  Start 4/5/20 at 

12:30;  Stop 4/6/20 at 12:29;  Status DC


Diphenhydramine HCl (Benadryl) 25 mg 1X PRN  PRN IV ITCHING;  Start 4/5/20 at 

12:30;  Stop 4/6/20 at 12:29;  Status DC


Info (PHARMACY MONITORING -- do not chart) 1 each PRN DAILY  PRN MC SEE 

COMMENTS;  Start 4/5/20 at 12:30;  Status Cancel


Bupivacaine HCl/ Epinephrine Bitart (Sensorcain-Epi 0.5%-1:[id number] Mpf) 30 ml 

STK-MED ONCE .ROUTE  Last administered on 4/6/20at 11:44;  Start 4/6/20 at 

11:00;  Stop 4/6/20 at 11:01;  Status DC


Cellulose (Surgicel Fibrillar 1x2) 1 each STK-MED ONCE .ROUTE ;  Start 4/6/20 at

11:00;  Stop 4/6/20 at 11:01;  Status DC


Sodium Chloride 90 meq/Potassium Chloride 15 meq/ Potassium Phosphate 10 mmol/ 

Magnesium Sulfate 12 meq/Calcium Gluconate 15 meq/ Multivitamins 10 ml/Chromium/

Copper/Manganese/ Seleni/Zn 0.5 ml/ Insulin Human Regular 25 unit/ Total 

Parenteral Nutrition/Amino Acids/Dextrose/ Fat Emulsion Intravenous 1,400 ml @  

58.333 mls/ hr TPN  CONT IV  Last administered on 4/6/20at 22:24;  Start 4/6/20 

at 22:00;  Stop 4/7/20 at 21:59;  Status DC


Propofol 20 ml @ As Directed STK-MED ONCE IV ;  Start 4/6/20 at 11:07;  Stop 

4/6/20 at 11:07;  Status DC


Cellulose (Surgicel Hemostat 4x8) 1 each STK-MED ONCE .ROUTE  Last administered 

on 4/6/20at 11:44;  Start 4/6/20 at 11:55;  Stop 4/6/20 at 11:56;  Status DC


Sevoflurane (Ultane) 60 ml STK-MED ONCE IH ;  Start 4/6/20 at 12:46;  Stop 

4/6/20 at 12:46;  Status DC


Sodium Chloride 1,000 ml @  1,000 mls/hr Q1H PRN IV hypotension;  Start 4/6/20 

at 13:51;  Stop 4/6/20 at 19:50;  Status DC


Albumin Human 200 ml @  200 mls/hr 1X PRN  PRN IV Hypotension Last administered 

on 4/6/20at 14:51;  Start 4/6/20 at 14:00;  Stop 4/6/20 at 19:59;  Status DC


Diphenhydramine HCl (Benadryl) 25 mg 1X PRN  PRN IV ITCHING;  Start 4/6/20 at 

14:00;  Stop 4/7/20 at 13:59;  Status DC


Diphenhydramine HCl (Benadryl) 25 mg 1X PRN  PRN IV ITCHING;  Start 4/6/20 at 

14:00;  Stop 4/7/20 at 13:59;  Status DC


Sodium Chloride 1,000 ml @  400 mls/hr Q2H30M PRN IV PATENCY;  Start 4/6/20 at 

13:51;  Stop 4/7/20 at 01:50;  Status DC


Info (PHARMACY MONITORING -- do not chart) 1 each PRN DAILY  PRN MC SEE 

COMMENTS;  Start 4/6/20 at 14:00;  Stop 4/9/20 at 08:16;  Status DC


Heparin Sodium (Porcine) (Hep Lock Adult) 500 unit STK-MED ONCE IVP ;  Start 

4/7/20 at 09:29;  Stop 4/7/20 at 09:30;  Status DC


Sodium Chloride 1,000 ml @  1,000 mls/hr Q1H PRN IV hypotension;  Start 4/7/20 

at 10:43;  Stop 4/7/20 at 16:42;  Status DC


Sodium Chloride 1,000 ml @  400 mls/hr Q2H30M PRN IV PATENCY;  Start 4/7/20 at 

10:43;  Stop 4/7/20 at 22:42;  Status DC


Info (PHARMACY MONITORING -- do not chart) 1 each PRN DAILY  PRN MC SEE 

COMMENTS;  Start 4/7/20 at 10:45;  Status UNV


Info (PHARMACY MONITORING -- do not chart) 1 each PRN DAILY  PRN MC SEE 

COMMENTS;  Start 4/7/20 at 10:45;  Status UNV


Sodium Chloride 90 meq/Potassium Chloride 15 meq/ Magnesium Sulfate 12 

meq/Calcium Gluconate 15 meq/ Multivitamins 10 ml/Chromium/ Copper/Manganese/ 

Seleni/Zn 0.5 ml/ Insulin Human Regular 25 unit/ Total Parenteral 

Nutrition/Amino Acids/Dextrose/ Fat Emulsion Intravenous 1,400 ml @  58.333 mls/

hr TPN  CONT IV  Last administered on 4/7/20at 22:13;  Start 4/7/20 at 22:00;  

Stop 4/8/20 at 21:59;  Status DC


Sodium Chloride 1,000 ml @  1,000 mls/hr Q1H PRN IV hypotension;  Start 4/8/20 

at 07:50;  Stop 4/8/20 at 13:49;  Status DC


Albumin Human 200 ml @  200 mls/hr 1X  ONCE IV ;  Start 4/8/20 at 08:00;  Stop 

4/8/20 at 08:53;  Status DC


Diphenhydramine HCl (Benadryl) 25 mg 1X PRN  PRN IV ITCHING;  Start 4/8/20 at 

08:00;  Stop 4/9/20 at 07:59;  Status DC


Diphenhydramine HCl (Benadryl) 25 mg 1X PRN  PRN IV ITCHING;  Start 4/8/20 at 

08:00;  Stop 4/9/20 at 07:59;  Status DC


Info (PHARMACY MONITORING -- do not chart) 1 each PRN DAILY  PRN MC SEE 

COMMENTS;  Start 4/8/20 at 08:00;  Stop 4/9/20 at 08:16;  Status DC


Albumin Human 50 ml @ 50 mls/hr 1X  ONCE IV ;  Start 4/8/20 at 08:53;  Stop 

4/8/20 at 08:56;  Status DC


Albumin Human 200 ml @  50 mls/hr PRN 1X  PRN IV HYPOTENSION Last administered 

on 4/14/20at 11:54;  Start 4/8/20 at 09:00;  Stop 5/21/20 at 11:14;  Status DC


Meropenem 500 mg/ Sodium Chloride 50 ml @  100 mls/hr Q12H IV  Last administered

on 4/28/20at 10:45;  Start 4/8/20 at 10:00;  Stop 4/28/20 at 12:37;  Status DC


Sodium Chloride 90 meq/Magnesium Sulfate 12 meq/ Calcium Gluconate 15 meq/ 

Multivitamins 10 ml/Chromium/ Copper/Manganese/ Seleni/Zn 0.5 ml/ Insulin Human 

Regular 25 unit/ Total Parenteral Nutrition/Amino Acids/Dextrose/ Fat Emulsion 

Intravenous 1,400 ml @  58.333 mls/ hr TPN  CONT IV  Last administered on 

4/8/20at 21:41;  Start 4/8/20 at 22:00;  Stop 4/9/20 at 21:59;  Status DC


Sodium Chloride 1,000 ml @  1,000 mls/hr Q1H PRN IV hypotension;  Start 4/9/20 

at 07:58;  Stop 4/9/20 at 13:57;  Status DC


Albumin Human 200 ml @  200 mls/hr 1X PRN  PRN IV Hypotension Last administered 

on 4/9/20at 09:30;  Start 4/9/20 at 08:00;  Stop 4/9/20 at 13:59;  Status DC


Sodium Chloride 1,000 ml @  400 mls/hr Q2H30M PRN IV PATENCY;  Start 4/9/20 at 

07:58;  Stop 4/9/20 at 19:57;  Status DC


Info (PHARMACY MONITORING -- do not chart) 1 each PRN DAILY  PRN MC SEE COMMENT

S;  Start 4/9/20 at 08:00;  Status Cancel


Info (PHARMACY MONITORING -- do not chart) 1 each PRN DAILY  PRN MC SEE 

COMMENTS;  Start 4/9/20 at 08:15;  Status UNV


Sodium Chloride 90 meq/Potassium Phosphate 5 mmol/ Magnesium Sulfate 12 

meq/Calcium Gluconate 15 meq/ Multivitamins 10 ml/Chromium/ Copper/Manganese/ 

Seleni/Zn 0.5 ml/ Insulin Human Regular 30 unit/ Total Parenteral Nutritio

n/Amino Acids/Dextrose/ Fat Emulsion Intravenous 1,400 ml @  58.333 mls/ hr TPN 

CONT IV  Last administered on 4/9/20at 22:08;  Start 4/9/20 at 22:00;  Stop 

4/10/20 at 21:59;  Status DC


Linezolid/Dextrose 300 ml @  300 mls/hr Q12HR IV  Last administered on 4/20/20at

20:40;  Start 4/10/20 at 11:00;  Stop 4/21/20 at 08:10;  Status DC


Sodium Chloride 90 meq/Potassium Phosphate 15 mmol/ Magnesium Sulfate 12 

meq/Calcium Gluconate 15 meq/ Multivitamins 10 ml/Chromium/ Copper/Manganese/ 

Seleni/Zn 0.5 ml/ Insulin Human Regular 30 unit/ Total Parenteral 

Nutrition/Amino Acids/Dextrose/ Fat Emulsion Intravenous 1,400 ml @  58.333 mls/

hr TPN  CONT IV  Last administered on 4/10/20at 21:49;  Start 4/10/20 at 22:00; 

Stop 4/11/20 at 21:59;  Status DC


Sodium Chloride 90 meq/Potassium Phosphate 15 mmol/ Magnesium Sulfate 12 

meq/Calcium Gluconate 15 meq/ Multivitamins 10 ml/Chromium/ Copper/Manganese/ 

Seleni/Zn 0.5 ml/ Insulin Human Regular 40 unit/ Total Parenteral 

Nutrition/Amino Acids/Dextrose/ Fat Emulsion Intravenous 1,400 ml @  58.333 mls/

hr TPN  CONT IV  Last administered on 4/11/20at 21:21;  Start 4/11/20 at 22:00; 

Stop 4/12/20 at 21:59;  Status DC


Sodium Chloride 1,000 ml @  1,000 mls/hr Q1H PRN IV hypotension;  Start 4/11/20 

at 13:26;  Stop 4/11/20 at 19:25;  Status DC


Albumin Human 200 ml @  200 mls/hr 1X PRN  PRN IV Hypotension Last administered 

on 4/11/20at 15:00;  Start 4/11/20 at 13:30;  Stop 4/11/20 at 19:29;  Status DC


Sodium Chloride (Normal Saline Flush) 10 ml 1X PRN  PRN IV AP catheter pack;  

Start 4/11/20 at 13:30;  Stop 4/12/20 at 13:29;  Status DC


Sodium Chloride (Normal Saline Flush) 10 ml 1X PRN  PRN IV  catheter pack;  

Start 4/11/20 at 13:30;  Stop 4/12/20 at 13:29;  Status DC


Sodium Chloride 1,000 ml @  400 mls/hr Q2H30M PRN IV PATENCY;  Start 4/11/20 at 

13:26;  Stop 4/12/20 at 01:25;  Status DC


Info (PHARMACY MONITORING -- do not chart) 1 each PRN DAILY  PRN MC SEE COMMENT

S;  Start 4/11/20 at 13:30;  Stop 4/11/20 at 13:33;  Status DC


Info (PHARMACY MONITORING -- do not chart) 1 each PRN DAILY  PRN MC SEE 

COMMENTS;  Start 4/11/20 at 13:30;  Stop 4/11/20 at 13:34;  Status DC


Sodium Chloride 90 meq/Potassium Phosphate 19 mmol/ Magnesium Sulfate 12 

meq/Calcium Gluconate 15 meq/ Multivitamins 10 ml/Chromium/ Copper/Manganese/ 

Seleni/Zn 0.5 ml/ Insulin Human Regular 40 unit/ Total Parenteral 

Nutrition/Amino Acids/Dextrose/ Fat Emulsion Intravenous 1,400 ml @  58.333 mls/

hr TPN  CONT IV  Last administered on 4/12/20at 21:54;  Start 4/12/20 at 22:00; 

Stop 4/13/20 at 21:59;  Status DC


Sodium Chloride 1,000 ml @  1,000 mls/hr Q1H PRN IV hypotension;  Start 4/13/20 

at 09:35;  Stop 4/13/20 at 15:34;  Status DC


Albumin Human 200 ml @  200 mls/hr 1X PRN  PRN IV Hypotension;  Start 4/13/20 at

09:45;  Stop 4/13/20 at 15:44;  Status DC


Diphenhydramine HCl (Benadryl) 25 mg 1X PRN  PRN IV ITCHING;  Start 4/13/20 at 

09:45;  Stop 4/14/20 at 09:44;  Status DC


Diphenhydramine HCl (Benadryl) 25 mg 1X PRN  PRN IV ITCHING;  Start 4/13/20 at 

09:45;  Stop 4/14/20 at 09:44;  Status DC


Sodium Chloride 1,000 ml @  400 mls/hr Q2H30M PRN IV PATENCY;  Start 4/13/20 at 

09:35;  Stop 4/13/20 at 21:34;  Status DC


Info (PHARMACY MONITORING -- do not chart) 1 each PRN DAILY  PRN MC SEE 

COMMENTS;  Start 4/13/20 at 09:45;  Status Cancel


Sodium Chloride 100 meq/Potassium Phosphate 19 mmol/ Magnesium Sulfate 12 

meq/Calcium Gluconate 15 meq/ Multivitamins 10 ml/Chromium/ Copper/Manganese/ 

Seleni/Zn 0.5 ml/ Insulin Human Regular 40 unit/ Potassium Chloride 20 meq/ 

Total Parenteral Nutrition/Amino Acids/Dextrose/ Fat Emulsion Intravenous 1,400 

ml @  58.333 mls/ hr TPN  CONT IV  Last administered on 4/13/20at 22:02;  Start 

4/13/20 at 22:00;  Stop 4/14/20 at 21:59;  Status DC


Furosemide (Lasix) 40 mg 1X  ONCE IVP  Last administered on 4/13/20at 14:39;  

Start 4/13/20 at 14:30;  Stop 4/13/20 at 14:31;  Status DC


Metronidazole 100 ml @  100 mls/hr Q8HRS IV  Last administered on 4/21/20at 

06:04;  Start 4/14/20 at 10:00;  Stop 4/21/20 at 08:10;  Status DC


Sodium Chloride 1,000 ml @  1,000 mls/hr Q1H PRN IV hypotension;  Start 4/14/20 

at 08:00;  Stop 4/14/20 at 13:59;  Status DC


Albumin Human 200 ml @  200 mls/hr 1X PRN  PRN IV Hypotension;  Start 4/14/20 at

08:00;  Stop 4/14/20 at 13:59;  Status DC


Sodium Chloride 1,000 ml @  400 mls/hr Q2H30M PRN IV PATENCY;  Start 4/14/20 at 

08:00;  Stop 4/14/20 at 19:59;  Status DC


Info (PHARMACY MONITORING -- do not chart) 1 each PRN DAILY  PRN MC SEE 

COMMENTS;  Start 4/14/20 at 11:30;  Status UNV


Info (PHARMACY MONITORING -- do not chart) 1 each PRN DAILY  PRN MC SEE 

COMMENTS;  Start 4/14/20 at 11:30;  Stop 4/16/20 at 12:13;  Status DC


Sodium Chloride 100 meq/Potassium Phosphate 19 mmol/ Magnesium Sulfate 12 

meq/Calcium Gluconate 15 meq/ Multivitamins 10 ml/Chromium/ Copper/Manganese/ 

Seleni/Zn 0.5 ml/ Insulin Human Regular 40 unit/ Potassium Chloride 20 meq/ 

Total Parenteral Nutrition/Amino Acids/Dextrose/ Fat Emulsion Intravenous 1,400 

ml @  58.333 mls/ hr TPN  CONT IV  Last administered on 4/14/20at 21:52;  Start 

4/14/20 at 22:00;  Stop 4/15/20 at 21:59;  Status DC


Sodium Chloride (Normal Saline Flush) 10 ml QSHIFT  PRN IV AFTER MEDS AND BLOOD 

DRAWS;  Start 4/14/20 at 15:00;  Stop 5/12/20 at 11:27;  Status DC


Sodium Chloride (Normal Saline Flush) 10 ml PRN Q5MIN  PRN IV AFTER MEDS AND 

BLOOD DRAWS;  Start 4/14/20 at 15:00


Sodium Chloride (Normal Saline Flush) 20 ml PRN Q5MIN  PRN IV AFTER MEDS AND 

BLOOD DRAWS;  Start 4/14/20 at 15:00


Sodium Chloride 100 meq/Potassium Phosphate 19 mmol/ Magnesium Sulfate 12 

meq/Calcium Gluconate 15 meq/ Multivitamins 10 ml/Chromium/ Copper/Manganese/ 

Seleni/Zn 0.5 ml/ Insulin Human Regular 40 unit/ Potassium Chloride 20 meq/ 

Total Parenteral Nutrition/Amino Acids/Dextrose/ Fat Emulsion Intravenous 1,400 

ml @  58.333 mls/ hr TPN  CONT IV  Last administered on 4/15/20at 21:20;  Start 

4/15/20 at 22:00;  Stop 4/16/20 at 21:59;  Status DC


Lidocaine HCl (Buffered Lidocaine 1%) 3 ml STK-MED ONCE .ROUTE ;  Start 4/15/20 

at 13:16;  Stop 4/15/20 at 13:16;  Status DC


Lidocaine HCl (Buffered Lidocaine 1%) 6 ml 1X  ONCE INJ  Last administered on 

4/15/20at 13:45;  Start 4/15/20 at 13:30;  Stop 4/15/20 at 13:31;  Status DC


Albumin Human 100 ml @  100 mls/hr 1X  ONCE IV  Last administered on 4/15/20at 

15:41;  Start 4/15/20 at 15:00;  Stop 4/15/20 at 15:59;  Status DC


Albumin Human 50 ml @ 50 mls/hr 1X  ONCE IV  Last administered on 4/15/20at 

15:00;  Start 4/15/20 at 15:00;  Stop 4/15/20 at 15:59;  Status DC


Info (PHARMACY MONITORING -- do not chart) 1 each PRN DAILY  PRN MC SEE MIKEY

TS;  Start 4/16/20 at 11:30;  Status Cancel


Info (PHARMACY MONITORING -- do not chart) 1 each PRN DAILY  PRN MC SEE 

COMMENTS;  Start 4/16/20 at 11:30;  Status UNV


Sodium Chloride 100 meq/Potassium Phosphate 10 mmol/ Magnesium Sulfate 12 

meq/Calcium Gluconate 15 meq/ Multivitamins 10 ml/Chromium/ Copper/Manganese/ 

Seleni/Zn 0.5 ml/ Insulin Human Regular 35 unit/ Potassium Chloride 20 meq/ 

Total Parenteral Nutrition/Amino Acids/Dextrose/ Fat Emulsion Intravenous 1,400 

ml @  58.333 mls/ hr TPN  CONT IV  Last administered on 4/16/20at 22:10;  Start 

4/16/20 at 22:00;  Stop 4/17/20 at 21:59;  Status DC


Sodium Chloride 100 meq/Potassium Phosphate 5 mmol/ Magnesium Sulfate 12 

meq/Calcium Gluconate 15 meq/ Multivitamins 10 ml/Chromium/ Copper/Manganese/ 

Seleni/Zn 0.5 ml/ Insulin Human Regular 35 unit/ Potassium Chloride 20 meq/ 

Total Parenteral Nutrition/Amino Acids/Dextrose/ Fat Emulsion Intravenous 1,400 

ml @  58.333 mls/ hr TPN  CONT IV  Last administered on 4/17/20at 22:59;  Start 

4/17/20 at 22:00;  Stop 4/18/20 at 21:59;  Status DC


Sodium Chloride 1,000 ml @  1,000 mls/hr Q1H PRN IV hypotension;  Start 4/18/20 

at 08:27;  Stop 4/18/20 at 14:26;  Status DC


Albumin Human 200 ml @  200 mls/hr 1X PRN  PRN IV Hypotension Last administered 

on 4/18/20at 09:18;  Start 4/18/20 at 08:30;  Stop 4/18/20 at 14:29;  Status DC


Sodium Chloride 1,000 ml @  400 mls/hr Q2H30M PRN IV PATENCY;  Start 4/18/20 at 

08:27;  Stop 4/18/20 at 20:26;  Status DC


Info (PHARMACY MONITORING -- do not chart) 1 each PRN DAILY  PRN MC SEE 

COMMENTS;  Start 4/18/20 at 08:30;  Status Cancel


Info (PHARMACY MONITORING -- do not chart) 1 each PRN DAILY  PRN MC SEE 

COMMENTS;  Start 4/18/20 at 08:30;  Stop 4/26/20 at 13:10;  Status DC


Sodium Chloride 100 meq/Potassium Chloride 40 meq/ Magnesium Sulfate 15 meq/C

alcium Gluconate 15 meq/ Multivitamins 10 ml/Chromium/ Copper/Manganese/ 

Seleni/Zn 0.5 ml/ Insulin Human Regular 35 unit/ Total Parenteral 

Nutrition/Amino Acids/Dextrose/ Fat Emulsion Intravenous 1,400 ml @  58.333 mls/

hr TPN  CONT IV  Last administered on 4/18/20at 22:00;  Start 4/18/20 at 22:00; 

Stop 4/19/20 at 21:59;  Status DC


Potassium Chloride/Water 100 ml @  100 mls/hr 1X  ONCE IV  Last administered on 

4/18/20at 17:28;  Start 4/18/20 at 14:45;  Stop 4/18/20 at 15:44;  Status DC


Sodium Chloride 100 meq/Potassium Chloride 40 meq/ Magnesium Sulfate 15 

meq/Calcium Gluconate 15 meq/ Multivitamins 10 ml/Chromium/ Copper/Manganese/ 

Seleni/Zn 0.5 ml/ Insulin Human Regular 35 unit/ Total Parenteral 

Nutrition/Amino Acids/Dextrose/ Fat Emulsion Intravenous 1,400 ml @  58.333 mls/

hr TPN  CONT IV  Last administered on 4/19/20at 22:46;  Start 4/19/20 at 22:00; 

Stop 4/20/20 at 21:59;  Status DC


Sodium Chloride 100 meq/Potassium Chloride 40 meq/ Magnesium Sulfate 20 

meq/Calcium Gluconate 15 meq/ Multivitamins 10 ml/Chromium/ Copper/Manganese/ 

Seleni/Zn 0.5 ml/ Insulin Human Regular 35 unit/ Total Parenteral 

Nutrition/Amino Acids/Dextrose/ Fat Emulsion Intravenous 1,400 ml @  58.333 mls/

hr TPN  CONT IV  Last administered on 4/20/20at 22:31;  Start 4/20/20 at 22:00; 

Stop 4/21/20 at 21:59;  Status DC


Fentanyl Citrate (Fentanyl 2ml Vial) 50 mcg PRN Q2HR  PRN IVP PAIN Last 

administered on 4/27/20at 13:32;  Start 4/20/20 at 21:00;  Stop 4/28/20 at 

12:53;  Status DC


Fentanyl Citrate (Fentanyl 2ml Vial) 25 mcg PRN Q2HR  PRN IVP PAIN;  Start 

4/20/20 at 21:00;  Stop 4/28/20 at 12:54;  Status DC


Enoxaparin Sodium (Lovenox 100mg Syringe) 100 mg Q12HR SQ ;  Start 4/21/20 at 

21:00;  Status UNV


Amino Acids/ Glycerin/ Electrolytes 1,000 ml @  75 mls/hr [id number] IV ;  Start 

4/20/20 at 21:15;  Status UNV


Sodium Chloride 1,000 ml @  1,000 mls/hr Q1H PRN IV hypotension;  Start 4/21/20 

at 07:56;  Stop 4/21/20 at 13:55;  Status DC


Albumin Human 200 ml @  200 mls/hr 1X PRN  PRN IV Hypotension Last administered 

on 4/21/20at 08:40;  Start 4/21/20 at 08:00;  Stop 4/21/20 at 13:59;  Status DC


Sodium Chloride 1,000 ml @  400 mls/hr Q2H30M PRN IV PATENCY;  Start 4/21/20 at 

07:56;  Stop 4/21/20 at 19:55;  Status DC


Info (PHARMACY MONITORING -- do not chart) 1 each PRN DAILY  PRN MC SEE 

COMMENTS;  Start 4/21/20 at 08:00;  Status UNV


Info (PHARMACY MONITORING -- do not chart) 1 each PRN DAILY  PRN MC SEE 

COMMENTS;  Start 4/21/20 at 08:00;  Status UNV


Daptomycin 430 mg/ Sodium Chloride 50 ml @  100 mls/hr Q24H IV  Last 

administered on 4/21/20at 12:35;  Start 4/21/20 at 09:00;  Stop 4/21/20 at 

12:49;  Status DC


Sodium Chloride 100 meq/Potassium Chloride 40 meq/ Magnesium Sulfate 20 

meq/Calcium Gluconate 15 meq/ Multivitamins 10 ml/Chromium/ Copper/Manganese/ 

Seleni/Zn 0.5 ml/ Insulin Human Regular 35 unit/ Total Parenteral 

Nutrition/Amino Acids/Dextrose/ Fat Emulsion Intravenous 1,400 ml @  58.333 mls/

hr TPN  CONT IV  Last administered on 4/21/20at 21:26;  Start 4/21/20 at 22:00; 

Stop 4/22/20 at 21:59;  Status DC


Daptomycin 430 mg/ Sodium Chloride 50 ml @  100 mls/hr Q48H IV ;  Start 4/23/20 

at 09:00;  Stop 4/22/20 at 11:55;  Status DC


Sodium Chloride 100 meq/Potassium Chloride 40 meq/ Magnesium Sulfate 20 

meq/Calcium Gluconate 15 meq/ Multivitamins 10 ml/Chromium/ Copper/Manganese/ 

Seleni/Zn 0.5 ml/ Insulin Human Regular 35 unit/ Total Parenteral 

Nutrition/Amino Acids/Dextrose/ Fat Emulsion Intravenous 1,400 ml @  58.333 mls/

hr TPN  CONT IV  Last administered on 4/22/20at 22:27;  Start 4/22/20 at 22:00; 

Stop 4/23/20 at 21:59;  Status DC


Daptomycin 430 mg/ Sodium Chloride 50 ml @  100 mls/hr Q24H IV  Last 

administered on 4/24/20at 15:07;  Start 4/22/20 at 13:00;  Stop 4/25/20 at 

13:15;  Status DC


Sodium Chloride 100 meq/Potassium Chloride 40 meq/ Magnesium Sulfate 20 

meq/Calcium Gluconate 10 meq/ Multivitamins 10 ml/Chromium/ Copper/Manganese/ 

Seleni/Zn 0.5 ml/ Insulin Human Regular 35 unit/ Total Parenteral 

Nutrition/Amino Acids/Dextrose/ Fat Emulsion Intravenous 1,400 ml @  58.333 mls/

hr TPN  CONT IV  Last administered on 4/24/20at 00:06;  Start 4/23/20 at 22:00; 

Stop 4/24/20 at 21:59;  Status DC


Alteplase, Recombinant (Cathflo For Central Catheter Clearance) 1 mg 1X  ONCE IN

T CAT  Last administered on 4/24/20at 11:44;  Start 4/24/20 at 10:45;  Stop 

4/24/20 at 10:46;  Status DC


Ondansetron HCl (Zofran) 4 mg PRN Q6HRS  PRN IV NAUSEA/VOMITING;  Start 4/27/20 

at 07:00;  Stop 4/28/20 at 06:59;  Status DC


Fentanyl Citrate (Fentanyl 2ml Vial) 25 mcg PRN Q5MIN  PRN IV MILD PAIN 1-3;  

Start 4/27/20 at 07:00;  Stop 4/28/20 at 06:59;  Status DC


Fentanyl Citrate (Fentanyl 2ml Vial) 50 mcg PRN Q5MIN  PRN IV MODERATE TO SEVERE

PAIN Last administered on 4/27/20at 10:17;  Start 4/27/20 at 07:00;  Stop 

4/28/20 at 06:59;  Status DC


Ringer's Solution 1,000 ml @  30 mls/hr Q24H IV ;  Start 4/27/20 at 07:00;  Stop

4/27/20 at 18:59;  Status DC


Lidocaine HCl (Xylocaine-Mpf 1% 2ml Vial) 2 ml PRN 1X  PRN ID PRIOR TO IV START;

 Start 4/27/20 at 07:00;  Stop 4/28/20 at 06:59;  Status DC


Prochlorperazine Edisylate (Compazine) 5 mg PACU PRN  PRN IV NAUSEA, MRX1;  

Start 4/27/20 at 07:00;  Stop 4/28/20 at 06:59;  Status DC


Sodium Acetate 50 meq/Potassium Acetate 55 meq/ Magnesium Sulfate 20 meq/Calcium

Gluconate 10 meq/ Multivitamins 10 ml/Chromium/ Copper/Manganese/ Seleni/Zn 0.5 

ml/ Insulin Human Regular 35 unit/ Total Parenteral Nutrition/Amino 

Acids/Dextrose/ Fat Emulsion Intravenous 1,400 ml @  58.333 mls/ hr TPN  CONT IV

;  Start 4/24/20 at 22:00;  Stop 4/24/20 at 14:15;  Status DC


Sodium Acetate 50 meq/Potassium Acetate 55 meq/ Magnesium Sulfate 20 meq/Calcium

Gluconate 10 meq/ Multivitamins 10 ml/Chromium/ Copper/Manganese/ Seleni/Zn 0.5 

ml/ Insulin Human Regular 35 unit/ Total Parenteral Nutrition/Amino 

Acids/Dextrose/ Fat Emulsion Intravenous 1,800 ml @  75 mls/hr TPN  CONT IV  

Last administered on 4/24/20at 22:38;  Start 4/24/20 at 22:00;  Stop 4/25/20 at 

21:59;  Status DC


Sodium Chloride 1,000 ml @  1,000 mls/hr Q1H PRN IV hypotension;  Start 4/24/20 

at 15:31;  Stop 4/24/20 at 21:30;  Status DC


Diphenhydramine HCl (Benadryl) 25 mg 1X PRN  PRN IV ITCHING;  Start 4/24/20 at 

15:45;  Stop 4/25/20 at 15:44;  Status DC


Diphenhydramine HCl (Benadryl) 25 mg 1X PRN  PRN IV ITCHING;  Start 4/24/20 at 

15:45;  Stop 4/25/20 at 15:44;  Status DC


Sodium Chloride 1,000 ml @  400 mls/hr Q2H30M PRN IV PATENCY;  Start 4/24/20 at 

15:31;  Stop 4/25/20 at 03:30;  Status DC


Info (PHARMACY MONITORING -- do not chart) 1 each PRN DAILY  PRN MC SEE 

COMMENTS;  Start 4/24/20 at 15:45;  Stop 5/26/20 at 14:14;  Status DC


Sodium Acetate 50 meq/Potassium Acetate 55 meq/ Magnesium Sulfate 20 meq/Calcium

Gluconate 10 meq/ Multivitamins 10 ml/Chromium/ Copper/Manganese/ Seleni/Zn 0.5 

ml/ Insulin Human Regular 35 unit/ Total Parenteral Nutrition/Amino 

Acids/Dextrose/ Fat Emulsion Intravenous 1,800 ml @  75 mls/hr TPN  CONT IV  

Last administered on 4/25/20at 22:03;  Start 4/25/20 at 22:00;  Stop 4/26/20 at 

21:59;  Status DC


Daptomycin 430 mg/ Sodium Chloride 50 ml @  100 mls/hr Q24H IV  Last 

administered on 4/30/20at 13:00;  Start 4/25/20 at 13:00;  Stop 4/30/20 at 

20:58;  Status DC


Heparin Sodium (Porcine) 1000 unit/Sodium Chloride 1,001 ml @  1,001 mls/hr 1X  

ONCE IRR ;  Start 4/27/20 at 06:00;  Stop 4/27/20 at 06:59;  Status DC


Potassium Acetate 55 meq/Magnesium Sulfate 20 meq/ Calcium Gluconate 10 meq/ 

Multivitamins 10 ml/Chromium/ Copper/Manganese/ Seleni/Zn 0.5 ml/ Insulin Human 

Regular 35 unit/ Total Parenteral Nutrition/Amino Acids/Dextrose/ Fat Emulsion 

Intravenous 1,920 ml @  80 mls/hr TPN  CONT IV  Last administered on 4/26/20at 

22:10;  Start 4/26/20 at 22:00;  Stop 4/27/20 at 21:59;  Status DC


Dexamethasone Sodium Phosphate (Decadron) 4 mg STK-MED ONCE .ROUTE ;  Start 

4/27/20 at 10:56;  Stop 4/27/20 at 10:57;  Status DC


Ondansetron HCl (Zofran) 4 mg STK-MED ONCE .ROUTE ;  Start 4/27/20 at 10:56;  

Stop 4/27/20 at 10:57;  Status DC


Rocuronium Bromide (Zemuron) 50 mg STK-MED ONCE .ROUTE ;  Start 4/27/20 at 

10:56;  Stop 4/27/20 at 10:57;  Status DC


Fentanyl Citrate (Fentanyl 2ml Vial) 100 mcg STK-MED ONCE .ROUTE ;  Start 

4/27/20 at 10:56;  Stop 4/27/20 at 10:57;  Status DC


Bupivacaine HCl/ Epinephrine Bitart (Sensorcain-Epi 0.5%-1:425903 Mpf) 30 ml 

STK-MED ONCE .ROUTE  Last administered on 4/27/20at 12:01;  Start 4/27/20 at 

10:58;  Stop 4/27/20 at 10:58;  Status DC


Cellulose (Surgicel Hemostat 2x14) 1 each STK-MED ONCE .ROUTE ;  Start 4/27/20 

at 10:58;  Stop 4/27/20 at 10:59;  Status DC


Iohexol (Omnipaque 300 Mg/ml) 50 ml STK-MED ONCE .ROUTE ;  Start 4/27/20 at 

10:58;  Stop 4/27/20 at 10:59;  Status DC


Cellulose (Surgicel Hemostat 4x8) 1 each STK-MED ONCE .ROUTE ;  Start 4/27/20 at

10:58;  Stop 4/27/20 at 10:59;  Status DC


Bisacodyl (Dulcolax Supp) 10 mg STK-MED ONCE .ROUTE ;  Start 4/27/20 at 10:59;  

Stop 4/27/20 at 10:59;  Status DC


Heparin Sodium (Porcine) 1000 unit/Sodium Chloride 1,001 ml @  1,001 mls/hr 1X  

ONCE IRR ;  Start 4/27/20 at 12:00;  Stop 4/27/20 at 12:59;  Status DC


Propofol 20 ml @ As Directed STK-MED ONCE IV ;  Start 4/27/20 at 11:05;  Stop 

4/27/20 at 11:05;  Status DC


Sevoflurane (Ultane) 90 ml STK-MED ONCE IH ;  Start 4/27/20 at 11:05;  Stop 

4/27/20 at 11:05;  Status DC


Sevoflurane (Ultane) 60 ml STK-MED ONCE IH ;  Start 4/27/20 at 12:26;  Stop 

4/27/20 at 12:27;  Status DC


Propofol 20 ml @ As Directed STK-MED ONCE IV ;  Start 4/27/20 at 12:26;  Stop 

4/27/20 at 12:27;  Status DC


Phenylephrine HCl (PHENYLEPHRINE in 0.9% NACL PF) 1 mg STK-MED ONCE IV ;  Start 

4/27/20 at 12:34;  Stop 4/27/20 at 12:34;  Status DC


Heparin Sodium (Porcine) (Heparin Sodium) 5,000 unit Q12HR SQ  Last administered

on 5/6/20at 20:57;  Start 4/27/20 at 21:00;  Stop 5/7/20 at 09:59;  Status DC


Sodium Chloride (Normal Saline Flush) 3 ml QSHIFT  PRN IV AFTER MEDS AND BLOOD 

DRAWS;  Start 4/27/20 at 13:45;  Status Cancel


Naloxone HCl (Narcan) 0.4 mg PRN Q2MIN  PRN IV SEE INSTRUCTIONS Last 

administered on 6/6/20at 15:15;  Start 4/27/20 at 13:45;  Stop 7/1/20 at 16:00; 

Status DC


Sodium Chloride 1,000 ml @  25 mls/hr Q24H IV  Last administered on 5/26/20at 

13:37;  Start 4/27/20 at 13:37;  Stop 5/29/20 at 13:09;  Status DC


Naloxone HCl (Narcan) 0.4 mg PRN Q2MIN  PRN IV SEE INSTRUCTIONS;  Start 4/27/20 

at 14:30;  Status UNV


Sodium Chloride 1,000 ml @  25 mls/hr Q24H IV ;  Start 4/27/20 at 14:30;  Status

UNV


Hydromorphone HCl 30 ml @ 0 mls/hr CONT PRN  PRN IV PER PROTOCOL Last 

administered on 5/2/20at 16:08;  Start 4/27/20 at 14:30;  Stop 5/4/20 at 08:55; 

Status DC


Potassium Acetate 55 meq/Magnesium Sulfate 20 meq/ Calcium Gluconate 10 meq/ 

Multivitamins 10 ml/Chromium/ Copper/Manganese/ Seleni/Zn 0.5 ml/ Insulin Human 

Regular 35 unit/ Total Parenteral Nutrition/Amino Acids/Dextrose/ Fat Emulsion 

Intravenous 1,920 ml @  80 mls/hr TPN  CONT IV  Last administered on 4/27/20at 

22:01;  Start 4/27/20 at 22:00;  Stop 4/28/20 at 21:59;  Status DC


Bumetanide (Bumex) 2 mg [id number] IV  Last administered on 5/1/20at 13:50;  Start 

4/28/20 at 14:00;  Stop 5/2/20 at 14:10;  Status DC


Meropenem 1 gm/ Sodium Chloride 100 ml @  200 mls/hr Q8HRS IV  Last administered

on 5/22/20at 05:53;  Start 4/28/20 at 14:00;  Stop 5/22/20 at 09:31;  Status DC


Potassium Acetate 55 meq/Magnesium Sulfate 20 meq/ Calcium Gluconate 10 meq/ 

Multivitamins 10 ml/Chromium/ Copper/Manganese/ Seleni/Zn 0.5 ml/ Insulin Human 

Regular 35 unit/ Total Parenteral Nutrition/Amino Acids/Dextrose/ Fat Emulsion 

Intravenous 1,920 ml @  80 mls/hr TPN  CONT IV  Last administered on 4/28/20at 

22:02;  Start 4/28/20 at 22:00;  Stop 4/29/20 at 21:59;  Status DC


Hydromorphone HCl (Dilaudid Standard PCA) 12 mg STK-MED ONCE IV ;  Start 4/27/20

at 14:35;  Stop 4/28/20 at 13:53;  Status DC


Artificial Tears (Artificial Tears) 1 drop PRN Q15MIN  PRN OU DRY EYE Last 

administered on 6/23/20at 21:17;  Start 4/29/20 at 05:30


Hydromorphone HCl (Dilaudid Standard PCA) 12 mg STK-MED ONCE IV ;  Start 4/28/20

at 12:05;  Stop 4/29/20 at 09:15;  Status DC


Potassium Acetate 65 meq/Magnesium Sulfate 20 meq/ Calcium Gluconate 10 meq/ 

Multivitamins 10 ml/Chromium/ Copper/Manganese/ Seleni/Zn 0.5 ml/ Insulin Human 

Regular 30 unit/ Total Parenteral Nutrition/Amino Acids/Dextrose/ Fat Emulsion 

Intravenous 1,920 ml @  80 mls/hr TPN  CONT IV  Last administered on 4/29/20at 

22:22;  Start 4/29/20 at 22:00;  Stop 4/30/20 at 21:59;  Status DC


Cyclobenzaprine HCl (Flexeril) 10 mg PRN Q6HRS  PRN PO MUSCLE SPASMS;  Start 

4/30/20 at 10:45


Potassium Acetate 55 meq/Magnesium Sulfate 20 meq/ Calcium Gluconate 10 meq/ 

Multivitamins 10 ml/Chromium/ Copper/Manganese/ Seleni/Zn 0.5 ml/ Insulin Human 

Regular 30 unit/ Total Parenteral Nutrition/Amino Acids/Dextrose/ Fat Emulsion 

Intravenous 1,920 ml @  80 mls/hr TPN  CONT IV  Last administered on 5/1/20at 

01:00;  Start 4/30/20 at 22:00;  Stop 5/1/20 at 21:59;  Status DC


Magnesium Sulfate 50 ml @ 25 mls/hr 1X  ONCE IV  Last administered on 4/30/20at 

17:18;  Start 4/30/20 at 12:45;  Stop 4/30/20 at 14:44;  Status DC


Potassium Chloride/Water 100 ml @  100 mls/hr 1X  ONCE IV  Last administered on 

5/1/20at 11:27;  Start 5/1/20 at 12:00;  Stop 5/1/20 at 12:59;  Status DC


Hydromorphone HCl (Dilaudid Standard PCA) 12 mg STK-MED ONCE IV ;  Start 4/29/20

at 10:50;  Stop 5/1/20 at 11:02;  Status DC


Hydromorphone HCl (Dilaudid Standard PCA) 12 mg STK-MED ONCE IV ;  Start 4/30/20

at 13:47;  Stop 5/1/20 at 11:03;  Status DC


Potassium Acetate 30 meq/Magnesium Sulfate 20 meq/ Calcium Gluconate 10 meq/ 

Multivitamins 10 ml/Chromium/ Copper/Manganese/ Seleni/Zn 0.5 ml/ Insulin Human 

Regular 30 unit/ Potassium Chloride 30 meq/ Total Parenteral Nutrition/Amino 

Acids/Dextrose/ Fat Emulsion Intravenous 1,920 ml @  80 mls/hr TPN  CONT IV  

Last administered on 5/1/20at 22:34;  Start 5/1/20 at 22:00;  Stop 5/2/20 at 

21:59;  Status DC


Potassium Chloride/Water 100 ml @  100 mls/hr Q1H IV  Last administered on 

5/2/20at 13:05;  Start 5/2/20 at 07:00;  Stop 5/2/20 at 10:59;  Status DC


Magnesium Sulfate 50 ml @ 25 mls/hr 1X  ONCE IV  Last administered on 5/2/20at 

10:34;  Start 5/2/20 at 10:30;  Stop 5/2/20 at 12:29;  Status DC


Potassium Chloride 75 meq/ Magnesium Sulfate 20 meq/Calcium Gluconate 10 meq/ 

Multivitamins 10 ml/Chromium/ Copper/Manganese/ Seleni/Zn 0.5 ml/ Insulin Human 

Regular 30 unit/ Total Parenteral Nutrition/Amino Acids/Dextrose/ Fat Emulsion 

Intravenous 1,920 ml @  80 mls/hr TPN  CONT IV  Last administered on 5/2/20at 

21:51;  Start 5/2/20 at 22:00;  Stop 5/3/20 at 22:00;  Status DC


Potassium Chloride 75 meq/ Magnesium Sulfate 20 meq/Calcium Gluconate 10 meq/ 

Multivitamins 10 ml/Chromium/ Copper/Manganese/ Seleni/Zn 0.5 ml/ Insulin Human 

Regular 25 unit/ Total Parenteral Nutrition/Amino Acids/Dextrose/ Fat Emulsion 

Intravenous 1,920 ml @  80 mls/hr TPN  CONT IV  Last administered on 5/3/20at 

22:04;  Start 5/3/20 at 22:00;  Stop 5/4/20 at 21:59;  Status DC


Hydromorphone HCl (Dilaudid) 0.4 mg PRN Q4HRS  PRN IVP PAIN Last administered on

5/4/20at 10:57;  Start 5/4/20 at 09:00;  Stop 5/4/20 at 18:59;  Status DC


Micafungin Sodium 100 mg/Dextrose 100 ml @  100 mls/hr Q24H IV  Last 

administered on 5/26/20at 12:17;  Start 5/4/20 at 11:00;  Stop 5/27/20 at 09:59;

 Status DC


Daptomycin 485 mg/ Sodium Chloride 50 ml @  100 mls/hr Q24H IV  Last 

administered on 5/11/20at 13:10;  Start 5/4/20 at 11:00;  Stop 5/12/20 at 07:44;

 Status DC


Potassium Chloride 75 meq/ Magnesium Sulfate 15 meq/Calcium Gluconate 8 meq/ 

Multivitamins 10 ml/Chromium/ Copper/Manganese/ Seleni/Zn 0.5 ml/ Insulin Human 

Regular 25 unit/ Total Parenteral Nutrition/Amino Acids/Dextrose/ Fat Emulsion 

Intravenous 1,920 ml @  80 mls/hr TPN  CONT IV  Last administered on 5/4/20at 

23:08;  Start 5/4/20 at 22:00;  Stop 5/5/20 at 21:59;  Status DC


Haloperidol Lactate (Haldol Inj) 3 mg 1X  ONCE IVP  Last administered on 

5/4/20at 14:37;  Start 5/4/20 at 14:30;  Stop 5/4/20 at 14:31;  Status DC


Hydromorphone HCl (Dilaudid) 1 mg PRN Q4HRS  PRN IVP PAIN Last administered on 

5/18/20at 06:25;  Start 5/4/20 at 19:00;  Stop 5/18/20 at 17:10;  Status DC


Potassium Chloride 75 meq/ Magnesium Sulfate 15 meq/Calcium Gluconate 8 meq/ 

Multivitamins 10 ml/Chromium/ Copper/Manganese/ Seleni/Zn 0.5 ml/ Insulin Human 

Regular 20 unit/ Total Parenteral Nutrition/Amino Acids/Dextrose/ Fat Emulsion 

Intravenous 1,920 ml @  80 mls/hr TPN  CONT IV  Last administered on 5/5/20at 

22:10;  Start 5/5/20 at 22:00;  Stop 5/6/20 at 21:59;  Status DC


Lidocaine HCl (Buffered Lidocaine 1%) 3 ml STK-MED ONCE .ROUTE ;  Start 5/6/20 

at 11:31;  Stop 5/6/20 at 11:31;  Status DC


Lidocaine HCl (Buffered Lidocaine 1%) 3 ml STK-MED ONCE .ROUTE ;  Start 5/6/20 

at 12:28;  Stop 5/6/20 at 12:29;  Status DC


Lidocaine HCl (Buffered Lidocaine 1%) 6 ml 1X  ONCE INJ  Last administered on 5 /6/20at 12:53;  Start 5/6/20 at 12:45;  Stop 5/6/20 at 12:46;  Status DC


Potassium Chloride 75 meq/ Magnesium Sulfate 15 meq/Calcium Gluconate 8 meq/ 

Multivitamins 10 ml/Chromium/ Copper/Manganese/ Seleni/Zn 0.5 ml/ Insulin Human 

Regular 20 unit/ Total Parenteral Nutrition/Amino Acids/Dextrose/ Fat Emulsion 

Intravenous 1,920 ml @  80 mls/hr TPN  CONT IV  Last administered on 5/6/20at 

22:00;  Start 5/6/20 at 22:00;  Stop 5/7/20 at 21:59;  Status DC


Potassium Chloride 75 meq/ Magnesium Sulfate 15 meq/Calcium Gluconate 8 meq/ 

Multivitamins 10 ml/Chromium/ Copper/Manganese/ Seleni/Zn 0.5 ml/ Insulin Human 

Regular 15 unit/ Total Parenteral Nutrition/Amino Acids/Dextrose/ Fat Emulsion 

Intravenous 1,920 ml @  80 mls/hr TPN  CONT IV  Last administered on 5/7/20at 22

:28;  Start 5/7/20 at 22:00;  Stop 5/8/20 at 21:59;  Status DC


Vecuronium Bromide (Norcuron Bolus) 6 mg PRN Q6HRS  PRN IV VENT ASYNCHRONY;  

Start 5/7/20 at 19:15;  Stop 5/7/20 at 19:35;  Status DC


Bumetanide (Bumex) 2 mg 1X  ONCE IV  Last administered on 5/7/20at 22:09;  Start

5/7/20 at 19:45;  Stop 5/7/20 at 19:46;  Status DC


Lidocaine HCl (Buffered Lidocaine 1%) 3 ml STK-MED ONCE .ROUTE ;  Start 5/8/20 

at 07:59;  Stop 5/8/20 at 07:59;  Status DC


Midazolam HCl (Versed) 5 mg STK-MED ONCE .ROUTE ;  Start 5/8/20 at 08:36;  Stop 

5/8/20 at 08:36;  Status DC


Fentanyl Citrate (Fentanyl 5ml Vial) 250 mcg STK-MED ONCE .ROUTE ;  Start 5/8/20

at 08:36;  Stop 5/8/20 at 08:37;  Status DC


Lidocaine HCl (Buffered Lidocaine 1%) 3 ml 1X  ONCE IJ  Last administered on 

5/8/20at 09:30;  Start 5/8/20 at 09:15;  Stop 5/8/20 at 09:16;  Status DC


Midazolam HCl (Versed) 5 mg 1X  ONCE IV  Last administered on 5/8/20at 09:30;  

Start 5/8/20 at 09:15;  Stop 5/8/20 at 09:16;  Status DC


Fentanyl Citrate (Fentanyl 5ml Vial) 250 mcg 1X  ONCE IV  Last administered on 

5/8/20at 09:30;  Start 5/8/20 at 09:15;  Stop 5/8/20 at 09:16;  Status DC


Bumetanide (Bumex) 2 mg DAILY IV  Last administered on 5/18/20at 08:07;  Start 

5/8/20 at 10:00;  Stop 5/18/20 at 17:15;  Status DC


Potassium Chloride 75 meq/ Magnesium Sulfate 15 meq/ Multivitamins 10 

ml/Chromium/ Copper/Manganese/ Seleni/Zn 0.5 ml/ Insulin Human Regular 15 unit/ 

Total Parenteral Nutrition/Amino Acids/Dextrose/ Fat Emulsion Intravenous 1,920 

ml @  80 mls/hr TPN  CONT IV  Last administered on 5/8/20at 21:59;  Start 5/8/20

at 22:00;  Stop 5/9/20 at 21:59;  Status DC


Metoclopramide HCl (Reglan Vial) 10 mg PRN Q3HRS  PRN IVP NAUSEA/VOMITING-3rd 

choice Last administered on 5/14/20at 04:25;  Start 5/9/20 at 16:45


Potassium Chloride 75 meq/ Magnesium Sulfate 15 meq/ Multivitamins 10 

ml/Chromium/ Copper/Manganese/ Seleni/Zn 0.5 ml/ Insulin Human Regular 15 unit/ 

Total Parenteral Nutrition/Amino Acids/Dextrose/ Fat Emulsion Intravenous 1,920 

ml @  80 mls/hr TPN  CONT IV  Last administered on 5/9/20at 22:41;  Start 5/9/20

at 22:00;  Stop 5/10/20 at 21:59;  Status DC


Magnesium Sulfate 50 ml @ 25 mls/hr 1X  ONCE IV  Last administered on 5/10/20at 

10:44;  Start 5/10/20 at 09:00;  Stop 5/10/20 at 10:59;  Status DC


Potassium Chloride/Water 100 ml @  100 mls/hr 1X  ONCE IV  Last administered on 

5/10/20at 09:37;  Start 5/10/20 at 09:00;  Stop 5/10/20 at 09:59;  Status DC


Duloxetine HCl (Cymbalta) 30 mg DAILY PO  Last administered on 5/11/20at 09:48; 

Start 5/10/20 at 14:00;  Stop 5/13/20 at 10:25;  Status DC


Potassium Chloride 80 meq/ Magnesium Sulfate 20 meq/ Multivitamins 10 

ml/Chromium/ Copper/Manganese/ Seleni/Zn 0.5 ml/ Insulin Human Regular 15 unit/ 

Total Parenteral Nutrition/Amino Acids/Dextrose/ Fat Emulsion Intravenous 1,920 

ml @  80 mls/hr TPN  CONT IV  Last administered on 5/10/20at 21:42;  Start 

5/10/20 at 22:00;  Stop 5/11/20 at 21:59;  Status DC


Potassium Chloride 80 meq/ Magnesium Sulfate 20 meq/ Multivitamins 10 

ml/Chromium/ Copper/Manganese/ Seleni/Zn 0.5 ml/ Insulin Human Regular 15 unit/ 

Total Parenteral Nutrition/Amino Acids/Dextrose/ Fat Emulsion Intravenous 1,920 

ml @  80 mls/hr TPN  CONT IV  Last administered on 5/11/20at 22:20;  Start 

5/11/20 at 22:00;  Stop 5/12/20 at 21:59;  Status DC


Lidocaine HCl (Buffered Lidocaine 1%) 3 ml STK-MED ONCE .ROUTE ;  Start 5/12/20 

at 09:54;  Stop 5/12/20 at 09:55;  Status DC


Hydromorphone HCl (Dilaudid Standard PCA) 12 mg STK-MED ONCE IV ;  Start 5/1/20 

at 15:50;  Stop 5/12/20 at 11:24;  Status DC


Potassium Chloride 80 meq/ Magnesium Sulfate 20 meq/ Multivitamins 10 

ml/Chromium/ Copper/Manganese/ Seleni/Zn 0.5 ml/ Insulin Human Regular 15 unit/ 

Total Parenteral Nutrition/Amino Acids/Dextrose/ Fat Emulsion Intravenous 1,920 

ml @  80 mls/hr TPN  CONT IV  Last administered on 5/12/20at 21:40;  Start 

5/12/20 at 22:00;  Stop 5/13/20 at 21:59;  Status DC


Lidocaine HCl (Buffered Lidocaine 1%) 6 ml 1X  ONCE INJ  Last administered on 

5/12/20at 14:15;  Start 5/12/20 at 14:15;  Stop 5/12/20 at 14:16;  Status DC


Potassium Chloride 80 meq/ Magnesium Sulfate 20 meq/ Multivitamins 10 

ml/Chromium/ Copper/Manganese/ Seleni/Zn 1 ml/ Insulin Human Regular 15 unit/ 

Total Parenteral Nutrition/Amino Acids/Dextrose/ Fat Emulsion Intravenous 1,920 

ml @  80 mls/hr TPN  CONT IV  Last administered on 5/13/20at 22:04;  Start 

5/13/20 at 22:00;  Stop 5/14/20 at 21:59;  Status DC


Potassium Chloride/Water 100 ml @  100 mls/hr 1X  ONCE IV  Last administered on 

5/14/20at 11:34;  Start 5/14/20 at 11:00;  Stop 5/14/20 at 11:59;  Status DC


Potassium Chloride 90 meq/ Magnesium Sulfate 20 meq/ Multivitamins 10 

ml/Chromium/ Copper/Manganese/ Seleni/Zn 1 ml/ Insulin Human Regular 15 unit/ 

Total Parenteral Nutrition/Amino Acids/Dextrose/ Fat Emulsion Intravenous 1,920 

ml @  80 mls/hr TPN  CONT IV  Last administered on 5/14/20at 22:57;  Start 

5/14/20 at 22:00;  Stop 5/15/20 at 21:59;  Status DC


Potassium Chloride 90 meq/ Magnesium Sulfate 20 meq/ Multivitamins 10 

ml/Chromium/ Copper/Manganese/ Seleni/Zn 1 ml/ Insulin Human Regular 15 unit/ 

Total Parenteral Nutrition/Amino Acids/Dextrose/ Fat Emulsion Intravenous 1,920 

ml @  80 mls/hr TPN  CONT IV  Last administered on 5/15/20at 22:48;  Start 

5/15/20 at 22:00;  Stop 5/16/20 at 21:59;  Status DC


Potassium Chloride 90 meq/ Magnesium Sulfate 20 meq/ Multivitamins 10 

ml/Chromium/ Copper/Manganese/ Seleni/Zn 1 ml/ Insulin Human Regular 15 unit/ 

Total Parenteral Nutrition/Amino Acids/Dextrose/ Fat Emulsion Intravenous 1,890 

ml @  78.75 mls/ hr TPN  CONT IV  Last administered on 5/16/20at 22:15;  Start 

5/16/20 at 22:00;  Stop 5/17/20 at 21:59;  Status DC


Linezolid/Dextrose 300 ml @  300 mls/hr Q12HR IV  Last administered on 5/19/20at

21:08;  Start 5/17/20 at 09:00;  Stop 5/20/20 at 08:11;  Status DC


Daptomycin 450 mg/ Sodium Chloride 50 ml @  100 mls/hr Q24H IV  Last 

administered on 5/20/20at 09:25;  Start 5/17/20 at 09:00;  Stop 5/21/20 at 

08:30;  Status DC


Potassium Chloride 90 meq/ Magnesium Sulfate 20 meq/ Multivitamins 10 

ml/Chromium/ Copper/Manganese/ Seleni/Zn 1 ml/ Insulin Human Regular 15 unit/ 

Total Parenteral Nutrition/Amino Acids/Dextrose/ Fat Emulsion Intravenous 1,890 

ml @  78.75 mls/ hr TPN  CONT IV  Last administered on 5/17/20at 21:34;  Start 

5/17/20 at 22:00;  Stop 5/18/20 at 21:59;  Status DC


Lorazepam (Ativan Inj) 2 mg STK-MED ONCE .ROUTE ;  Start 5/17/20 at 14:58;  Stop

5/17/20 at 14:58;  Status DC


Metoprolol Tartrate (Lopressor Vial) 5 mg 1X  ONCE IVP  Last administered on 

5/17/20at 15:31;  Start 5/17/20 at 15:15;  Stop 5/17/20 at 15:16;  Status DC


Lorazepam (Ativan Inj) 2 mg 1X  ONCE IVP  Last administered on 5/17/20at 15:30; 

Start 5/17/20 at 15:15;  Stop 5/17/20 at 15:16;  Status DC


Enoxaparin Sodium (Lovenox 40mg Syringe) 40 mg Q24H SQ  Last administered on 

6/5/20at 17:44;  Start 5/17/20 at 17:00;  Stop 6/7/20 at 06:50;  Status DC


Lorazepam (Ativan Inj) 1 mg PRN Q4HRS  PRN IVP ANXIETY / AGITATION MILD-MOD Last

administered on 5/31/20at 15:55;  Start 5/17/20 at 19:15;  Stop 6/2/20 at 11:45;

 Status DC


Lorazepam (Ativan Inj) 2 mg PRN Q4HRS  PRN IVP ANXIETY / AGITATION SEVERE Last 

administered on 6/1/20at 07:55;  Start 5/17/20 at 19:15;  Stop 6/2/20 at 11:45; 

Status DC


Fentanyl Citrate (Fentanyl 2ml Vial) 50 mcg PRN Q4HRS  PRN IVP SEVERE PAIN Last 

administered on 6/13/20at 05:15;  Start 5/18/20 at 13:15;  Stop 6/14/20 at 

09:29;  Status DC


Fentanyl Citrate (Fentanyl 2ml Vial) 25 mcg PRN Q4HRS  PRN IVP MODERATE PAIN 

Last administered on 6/13/20at 00:27;  Start 5/18/20 at 13:15;  Stop 6/14/20 at 

09:30;  Status DC


Potassium Chloride 90 meq/ Magnesium Sulfate 20 meq/ Multivitamins 10 

ml/Chromium/ Copper/Manganese/ Seleni/Zn 1 ml/ Insulin Human Regular 15 unit/ 

Total Parenteral Nutrition/Amino Acids/Dextrose/ Fat Emulsion Intravenous 1,890 

ml @  78.75 mls/ hr TPN  CONT IV  Last administered on 5/18/20at 22:18;  Start 

5/18/20 at 22:00;  Stop 5/19/20 at 21:59;  Status DC


Furosemide (Lasix) 40 mg 1X  ONCE IVP  Last administered on 5/18/20at 21:51;  

Start 5/18/20 at 21:45;  Stop 5/18/20 at 21:48;  Status DC


Albumin Human 100 ml @  100 mls/hr 1X PRN  PRN IV SEE COMMENTS;  Start 5/19/20 

at 01:30


Furosemide (Lasix) 40 mg [id number] IVP  Last administered on 6/3/20at 08:04;  Start 

5/19/20 at 14:00;  Stop 6/3/20 at 13:07;  Status DC


Potassium Chloride 90 meq/ Magnesium Sulfate 20 meq/ Multivitamins 10 

ml/Chromium/ Copper/Manganese/ Seleni/Zn 1 ml/ Insulin Human Regular 15 unit/ 

Total Parenteral Nutrition/Amino Acids/Dextrose/ Fat Emulsion Intravenous 1,800 

ml @  75 mls/hr TPN  CONT IV  Last administered on 5/19/20at 22:31;  Start 

5/19/20 at 22:00;  Stop 5/20/20 at 21:59;  Status DC


Potassium Chloride 90 meq/ Magnesium Sulfate 20 meq/ Multivitamins 10 

ml/Chromium/ Copper/Manganese/ Seleni/Zn 1 ml/ Insulin Human Regular 15 unit/ 

Total Parenteral Nutrition/Amino Acids/Dextrose/ Fat Emulsion Intravenous 1,800 

ml @  75 mls/hr TPN  CONT IV  Last administered on 5/20/20at 22:28;  Start 

5/20/20 at 22:00;  Stop 5/21/20 at 21:59;  Status DC


Potassium Chloride 110 meq/ Magnesium Sulfate 20 meq/ Multivitamins 10 

ml/Chromium/ Copper/Manganese/ Seleni/Zn 1 ml/ Insulin Human Regular 15 unit/ 

Total Parenteral Nutrition/Amino Acids/Dextrose/ Fat Emulsion Intravenous 1,800 

ml @  75 mls/hr TPN  CONT IV  Last administered on 5/21/20at 22:01;  Start 

5/21/20 at 22:00;  Stop 5/22/20 at 21:59;  Status DC


Saliva Substitute (Biotene Moisturizing Mouth) 2 spray PRN Q15MIN  PRN PO DRY 

MOUTH;  Start 5/21/20 at 11:00


Potassium Chloride 110 meq/ Magnesium Sulfate 20 meq/ Multivitamins 10 

ml/Chromium/ Copper/Manganese/ Seleni/Zn 1 ml/ Insulin Human Regular 15 unit/ 

Total Parenteral Nutrition/Amino Acids/Dextrose/ Fat Emulsion Intravenous 1,800 

ml @  75 mls/hr TPN  CONT IV  Last administered on 5/22/20at 22:21;  Start 

5/22/20 at 22:00;  Stop 5/23/20 at 21:59;  Status DC


Potassium Chloride 110 meq/ Magnesium Sulfate 20 meq/ Multivitamins 10 

ml/Chromium/ Copper/Manganese/ Seleni/Zn 1 ml/ Insulin Human Regular 15 unit/ 

Total Parenteral Nutrition/Amino Acids/Dextrose/ Fat Emulsion Intravenous 1,800 

ml @  75 mls/hr TPN  CONT IV  Last administered on 5/23/20at 22:04;  Start 

5/23/20 at 22:00;  Stop 5/24/20 at 21:59;  Status DC


Potassium Chloride 110 meq/ Magnesium Sulfate 20 meq/ Multivitamins 10 

ml/Chromium/ Copper/Manganese/ Seleni/Zn 1 ml/ Insulin Human Regular 15 unit/ 

Total Parenteral Nutrition/Amino Acids/Dextrose/ Fat Emulsion Intravenous 1,800 

ml @  75 mls/hr TPN  CONT IV  Last administered on 5/24/20at 22:48;  Start 

5/24/20 at 22:00;  Stop 5/25/20 at 21:59;  Status DC


Potassium Chloride 70 meq/ Magnesium Sulfate 20 meq/ Multivitamins 10 

ml/Chromium/ Copper/Manganese/ Seleni/Zn 1 ml/ Insulin Human Regular 15 unit/ 

Total Parenteral Nutrition/Amino Acids/Dextrose/ Fat Emulsion Intravenous 1,800 

ml @  75 mls/hr TPN  CONT IV  Last administered on 5/25/20at 21:39;  Start 

5/25/20 at 22:00;  Stop 5/26/20 at 21:59;  Status DC


Meropenem 500 mg/ Sodium Chloride 50 ml @  100 mls/hr Q6HRS IV  Last 

administered on 5/27/20at 06:02;  Start 5/25/20 at 18:00;  Stop 5/27/20 at 09:5

9;  Status DC


Barium Sulfate (Varibar Thin Liquid Apple) 148 gm 1X  ONCE PO ;  Start 5/26/20 

at 11:45;  Stop 5/26/20 at 11:49;  Status DC


Potassium Chloride 70 meq/ Magnesium Sulfate 20 meq/ Multivitamins 10 

ml/Chromium/ Copper/Manganese/ Seleni/Zn 1 ml/ Insulin Human Regular 15 unit/ 

Total Parenteral Nutrition/Amino Acids/Dextrose/ Fat Emulsion Intravenous 1,800 

ml @  75 mls/hr TPN  CONT IV  Last administered on 5/26/20at 22:27;  Start 

5/26/20 at 22:00;  Stop 5/27/20 at 21:59;  Status DC


Piperacillin Sod/ Tazobactam Sod 3.375 gm/Sodium Chloride 50 ml @  100 mls/hr 

Q6HRS IV  Last administered on 6/4/20at 06:10;  Start 5/27/20 at 12:00;  Stop 

6/4/20 at 07:26;  Status DC


Potassium Chloride 70 meq/ Magnesium Sulfate 20 meq/ Multivitamins 10 

ml/Chromium/ Copper/Manganese/ Seleni/Zn 1 ml/ Insulin Human Regular 15 unit/ 

Total Parenteral Nutrition/Amino Acids/Dextrose/ Fat Emulsion Intravenous 1,800 

ml @  75 mls/hr TPN  CONT IV  Last administered on 5/27/20at 22:03;  Start 

5/27/20 at 22:00;  Stop 5/28/20 at 21:59;  Status DC


Potassium Chloride 70 meq/ Magnesium Sulfate 20 meq/ Multivitamins 10 

ml/Chromium/ Copper/Manganese/ Seleni/Zn 1 ml/ Insulin Human Regular 15 unit/ 

Total Parenteral Nutrition/Amino Acids/Dextrose/ Fat Emulsion Intravenous 1,800 

ml @  75 mls/hr TPN  CONT IV  Last administered on 5/28/20at 22:33;  Start 

5/28/20 at 22:00;  Stop 5/29/20 at 21:59;  Status DC


Potassium Chloride 70 meq/ Magnesium Sulfate 20 meq/ Multivitamins 10 

ml/Chromium/ Copper/Manganese/ Seleni/Zn 1 ml/ Insulin Human Regular 15 unit/ 

Total Parenteral Nutrition/Amino Acids/Dextrose/ Fat Emulsion Intravenous 1,800 

ml @  75 mls/hr TPN  CONT IV  Last administered on 5/29/20at 23:13;  Start 

5/29/20 at 22:00;  Stop 5/30/20 at 21:59;  Status DC


Potassium Chloride 80 meq/ Magnesium Sulfate 20 meq/ Multivitamins 10 

ml/Chromium/ Copper/Manganese/ Seleni/Zn 1 ml/ Insulin Human Regular 15 unit/ 

Total Parenteral Nutrition/Amino Acids/Dextrose/ Fat Emulsion Intravenous 1,800 

ml @  75 mls/hr TPN  CONT IV  Last administered on 5/30/20at 22:30;  Start 

5/30/20 at 22:00;  Stop 5/31/20 at 21:59;  Status DC


Potassium Chloride 80 meq/ Magnesium Sulfate 20 meq/ Multivitamins 10 ml/C

hromium/ Copper/Manganese/ Seleni/Zn 1 ml/ Insulin Human Regular 15 unit/ Total 

Parenteral Nutrition/Amino Acids/Dextrose/ Fat Emulsion Intravenous 1,800 ml @  

75 mls/hr TPN  CONT IV  Last administered on 5/31/20at 21:54;  Start 5/31/20 at 

22:00;  Stop 6/1/20 at 21:59;  Status DC


Potassium Chloride/Water 100 ml @  100 mls/hr 1X  ONCE IV  Last administered on 

6/1/20at 10:15;  Start 6/1/20 at 10:00;  Stop 6/1/20 at 10:59;  Status DC


Potassium Chloride 90 meq/ Magnesium Sulfate 20 meq/ Multivitamins 10 ml/

mium/ Copper/Manganese/ Seleni/Zn 1 ml/ Insulin Human Regular 20 unit/ Total 

Parenteral Nutrition/Amino Acids/Dextrose/ Fat Emulsion Intravenous 1,800 ml @  

75 mls/hr TPN  CONT IV  Last administered on 6/1/20at 22:28;  Start 6/1/20 at 

22:00;  Stop 6/2/20 at 21:59;  Status DC


Potassium Chloride 90 meq/ Magnesium Sulfate 20 meq/ Multivitamins 10 

ml/Chromium/ Copper/Manganese/ Seleni/Zn 1 ml/ Insulin Human Regular 20 unit/ 

Total Parenteral Nutrition/Amino Acids/Dextrose/ Fat Emulsion Intravenous 1,800 

ml @  75 mls/hr TPN  CONT IV  Last administered on 6/2/20at 22:08;  Start 6/2/20

at 22:00;  Stop 6/3/20 at 21:59;  Status DC


Lorazepam (Ativan Inj) 0.25 mg PRN Q4HRS  PRN IVP ANXIETY / AGITATION Last 

administered on 6/27/20at 13:37;  Start 6/3/20 at 07:30


Potassium Chloride 90 meq/ Magnesium Sulfate 20 meq/ Multivitamins 10 

ml/Chromium/ Copper/Manganese/ Seleni/Zn 1 ml/ Insulin Human Regular 20 unit/ 

Total Parenteral Nutrition/Amino Acids/Dextrose/ Fat Emulsion Intravenous 1,800 

ml @  75 mls/hr TPN  CONT IV  Last administered on 6/3/20at 23:13;  Start 6/3/20

at 22:00;  Stop 6/4/20 at 21:59;  Status DC


Furosemide (Lasix) 40 mg DAILY IVP  Last administered on 6/5/20at 11:14;  Start 

6/3/20 at 13:30;  Stop 6/7/20 at 09:12;  Status DC


Fluoxetine HCl (PROzac) 20 mg QHS PEG  Last administered on 7/6/20at 21:47;  

Start 6/4/20 at 21:00


Fentanyl (Duragesic 50mcg/ Hr Patch) 1 patch Q72H TD  Last administered on 

6/4/20at 21:22;  Start 6/4/20 at 21:00;  Stop 6/13/20 at 12:00;  Status DC


Potassium Chloride 40 meq/ Potassium Acetate 60 meq/Magnesium Sulfate 10 meq/ 

Multivitamins 10 ml/Chromium/ Copper/Manganese/ Seleni/Zn 1 ml/ Insulin Human 

Regular 20 unit/ Total Parenteral Nutrition/Amino Acids/Dextrose/ Fat Emulsion 

Intravenous 1,800 ml @  75 mls/hr TPN  CONT IV  Last administered on 6/5/20at 

00:03;  Start 6/4/20 at 22:00;  Stop 6/5/20 at 21:59;  Status DC


Potassium Acetate 80 meq/Magnesium Sulfate 5 meq/ Multivitamins 10 ml/Chromium/ 

Copper/Manganese/ Seleni/Zn 1 ml/ Insulin Human Regular 20 unit/ Total 

Parenteral Nutrition/Amino Acids/Dextrose/ Fat Emulsion Intravenous 1,920 ml @  

80 mls/hr TPN  CONT IV  Last administered on 6/5/20at 21:59;  Start 6/5/20 at 

22:00;  Stop 6/6/20 at 21:59;  Status DC


Potassium Acetate 60 meq/Magnesium Sulfate 5 meq/ Multivitamins 10 ml/Chromium/ 

Copper/Manganese/ Seleni/Zn 1 ml/ Insulin Human Regular 30 unit/ Total 

Parenteral Nutrition/Amino Acids/Dextrose/ Fat Emulsion Intravenous 1,920 ml @  

80 mls/hr TPN  CONT IV  Last administered on 6/6/20at 21:54;  Start 6/6/20 at 

22:00;  Stop 6/7/20 at 21:59;  Status DC


Norepinephrine Bitartrate 8 mg/ Dextrose 258 ml @  13.332 mls/ hr CONT  PRN IV 

PER PROTOCOL Last administered on 7/2/20at 09:09;  Start 6/7/20 at 06:30


Albumin Human 500 ml @  125 mls/hr 1X  ONCE IV  Last administered on 6/7/20at 

08:10;  Start 6/7/20 at 08:15;  Stop 6/7/20 at 12:14;  Status DC


Potassium Acetate 40 meq/Magnesium Sulfate 5 meq/ Multivitamins 10 ml/Chromium/ 

Copper/Manganese/ Seleni/Zn 1 ml/ Insulin Human Regular 30 unit/ Total 

Parenteral Nutrition/Amino Acids/Dextrose/ Fat Emulsion Intravenous 1,920 ml @  

80 mls/hr TPN  CONT IV  Last administered on 6/7/20at 22:23;  Start 6/7/20 at 

22:00;  Stop 6/8/20 at 21:59;  Status DC


Meropenem 1 gm/ Sodium Chloride 100 ml @  200 mls/hr Q8HRS IV ;  Start 6/7/20 at

14:00;  Status Cancel


Meropenem 1 gm/ Sodium Chloride 100 ml @  200 mls/hr Q8HRS IV  Last administered

on 6/7/20at 11:04;  Start 6/7/20 at 10:00;  Stop 6/7/20 at 13:00;  Status DC


Meropenem 1 gm/ Sodium Chloride 100 ml @  200 mls/hr Q12HR IV  Last administered

on 6/25/20at 08:27;  Start 6/7/20 at 21:00;  Stop 6/25/20 at 08:56;  Status DC


Sodium Chloride 1,000 ml @  1,000 mls/hr 1X  ONCE IV  Last administered on 

6/7/20at 11:06;  Start 6/7/20 at 10:45;  Stop 6/7/20 at 11:44;  Status DC


Micafungin Sodium 100 mg/Dextrose 100 ml @  100 mls/hr Q24H IV  Last 

administered on 6/24/20at 12:34;  Start 6/7/20 at 11:00;  Stop 6/25/20 at 08:56;

 Status DC


Daptomycin 410 mg/ Sodium Chloride 50 ml @  100 mls/hr Q24H IV  Last 

administered on 6/9/20at 13:33;  Start 6/7/20 at 14:00;  Stop 6/10/20 at 08:30; 

Status DC


Midazolam HCl (Versed) 2 mg STK-MED ONCE .ROUTE ;  Start 6/7/20 at 14:47;  Stop 

6/7/20 at 14:48;  Status DC


Fentanyl Citrate (Fentanyl 2ml Vial) 100 mcg STK-MED ONCE .ROUTE ;  Start 6/7/20

at 14:47;  Stop 6/7/20 at 14:48;  Status DC


Flumazenil (Romazicon) 0.5 mg STK-MED ONCE IV ;  Start 6/7/20 at 14:48;  Stop 

6/7/20 at 14:48;  Status DC


Naloxone HCl (Narcan) 0.4 mg STK-MED ONCE .ROUTE ;  Start 6/7/20 at 14:48;  Stop

6/7/20 at 14:48;  Status DC


Lidocaine HCl (Lidocaine 1% 20ml Vial) 20 ml STK-MED ONCE .ROUTE ;  Start 6/7/20

at 14:48;  Stop 6/7/20 at 14:48;  Status DC


Midazolam HCl (Versed) 2 mg 1X  ONCE IV  Last administered on 6/7/20at 15:28;  

Start 6/7/20 at 15:00;  Stop 6/7/20 at 15:01;  Status DC


Fentanyl Citrate (Fentanyl 2ml Vial) 100 mcg 1X  ONCE IV  Last administered on 

6/7/20at 15:28;  Start 6/7/20 at 15:00;  Stop 6/7/20 at 15:01;  Status DC


Lidocaine HCl (Lidocaine 1% 20ml Vial) 20 ml 1X  ONCE INJ  Last administered on 

6/7/20at 15:30;  Start 6/7/20 at 15:00;  Stop 6/7/20 at 15:01;  Status DC


Sodium Chloride 1,000 ml @  100 mls/hr Q10H IV  Last administered on 6/16/20at 

07:30;  Start 6/7/20 at 20:00;  Stop 6/16/20 at 11:26;  Status DC


Sodium Bicarbonate (Sodium Bicarb Adult 8.4% Syr) 50 meq 1X  ONCE IV  Last 

administered on 6/7/20at 21:47;  Start 6/7/20 at 22:00;  Stop 6/7/20 at 22:01;  

Status DC


Potassium Acetate 40 meq/Magnesium Sulfate 5 meq/ Multivitamins 10 ml/Chromium/ 

Copper/Manganese/ Seleni/Zn 1 ml/ Insulin Human Regular 30 unit/ Total 

Parenteral Nutrition/Amino Acids/Dextrose/ Fat Emulsion Intravenous 1,920 ml @  

80 mls/hr TPN  CONT IV  Last administered on 6/8/20at 22:28;  Start 6/8/20 at 

22:00;  Stop 6/9/20 at 21:59;  Status DC


Sodium Chloride 500 ml @  500 mls/hr 1X  ONCE IV  Last administered on 6/9/20at 

06:39;  Start 6/9/20 at 06:45;  Stop 6/9/20 at 07:44;  Status DC


Potassium Acetate 40 meq/Magnesium Sulfate 5 meq/ Multivitamins 10 ml/Chromium/ 

Copper/Manganese/ Seleni/Zn 1 ml/ Insulin Human Regular 30 unit/ Total 

Parenteral Nutrition/Amino Acids/Dextrose/ Fat Emulsion Intravenous 1,920 ml @  

80 mls/hr TPN  CONT IV  Last administered on 6/9/20at 22:03;  Start 6/9/20 at 

22:00;  Stop 6/10/20 at 21:59;  Status DC


Metoprolol Tartrate (Lopressor Vial) 5 mg PRN Q6HRS  PRN IVP HYPERTENSION Last 

administered on 6/18/20at 10:14;  Start 6/10/20 at 09:00


Potassium Acetate 40 meq/Magnesium Sulfate 5 meq/ Multivitamins 10 ml/Chromium/ 

Copper/Manganese/ Seleni/Zn 1 ml/ Insulin Human Regular 30 unit/ Total 

Parenteral Nutrition/Amino Acids/Dextrose/ Fat Emulsion Intravenous 1,920 ml @  

80 mls/hr TPN  CONT IV  Last administered on 6/10/20at 21:26;  Start 6/10/20 at 

22:00;  Stop 6/11/20 at 21:59;  Status DC


Potassium Acetate 40 meq/Magnesium Sulfate 5 meq/ Multivitamins 10 ml/Chromium/ 

Copper/Manganese/ Seleni/Zn 1 ml/ Insulin Human Regular 30 unit/ Total 

Parenteral Nutrition/Amino Acids/Dextrose/ Fat Emulsion Intravenous 1,920 ml @  

80 mls/hr TPN  CONT IV  Last administered on 6/11/20at 23:23;  Start 6/11/20 at 

22:00;  Stop 6/12/20 at 21:59;  Status DC


Potassium Acetate 40 meq/Magnesium Sulfate 5 meq/ Multivitamins 10 ml/Chromium/ 

Copper/Manganese/ Seleni/Zn 1 ml/ Insulin Human Regular 30 unit/ Total 

Parenteral Nutrition/Amino Acids/Dextrose/ Fat Emulsion Intravenous 1,920 ml @  

80 mls/hr TPN  CONT IV  Last administered on 6/12/20at 21:35;  Start 6/12/20 at 

22:00;  Stop 6/13/20 at 21:59;  Status DC


Furosemide (Lasix) 20 mg 1X  ONCE IVP  Last administered on 6/13/20at 06:26;  

Start 6/13/20 at 06:15;  Stop 6/13/20 at 06:16;  Status DC


Methylprednisolone Sodium Succinate (SOLU-Medrol 125MG VIAL) 125 mg 1X  ONCE IV 

Last administered on 6/13/20at 06:26;  Start 6/13/20 at 06:15;  Stop 6/13/20 at 

06:16;  Status DC


Albuterol/ Ipratropium (Duoneb) 3 ml Q4HRS NEB  Last administered on 7/7/20at 

12:05;  Start 6/13/20 at 08:00


Fentanyl Citrate 30 ml @ 0 mls/hr CONT  PRN IV SEE PROTOCOL Last administered on

7/4/20at 08:03;  Start 6/13/20 at 06:00;  Stop 7/4/20 at 12:42;  Status DC


Propofol 100 ml @ 0 mls/hr CONT  PRN IV SEE PROTOCOL Last administered on 

6/20/20at 23:50;  Start 6/13/20 at 06:00


Fentanyl Citrate (Fentanyl 2ml Vial) 25 mcg PRN Q1HR  PRN IV SEE COMMENTS;  

Start 6/13/20 at 06:00


Fentanyl Citrate (Fentanyl 2ml Vial) 50 mcg PRN Q1HR  PRN IV SEE COMMENTS;  

Start 6/13/20 at 06:00


Chlorhexidine Gluconate (Peridex) 15 ml BID MM ;  Start 6/13/20 at 09:00;  Stop 

6/13/20 at 07:58;  Status DC


Potassium Acetate 40 meq/Magnesium Sulfate 5 meq/ Multivitamins 10 ml/Chromium/ 

Copper/Manganese/ Seleni/Zn 1 ml/ Insulin Human Regular 30 unit/ Total 

Parenteral Nutrition/Amino Acids/Dextrose/ Fat Emulsion Intravenous 1,920 ml @  

80 mls/hr TPN  CONT IV  Last administered on 6/13/20at 21:19;  Start 6/13/20 at 

22:00;  Stop 6/14/20 at 21:59;  Status DC


Acetylcysteine (Mucomyst 20% Resp Treatment) 600 mg BID NEB  Last administered 

on 6/19/20at 09:33;  Start 6/13/20 at 21:00;  Stop 6/19/20 at 10:39;  Status DC


Magnesium Sulfate 100 ml @  25 mls/hr 1X  ONCE IV  Last administered on 

6/13/20at 15:48;  Start 6/13/20 at 15:45;  Stop 6/13/20 at 19:44;  Status DC


Potassium Acetate 40 meq/Magnesium Sulfate 5 meq/ Multivitamins 10 ml/Chromium/ 

Copper/Manganese/ Seleni/Zn 1 ml/ Insulin Human Regular 30 unit/ Total 

Parenteral Nutrition/Amino Acids/Dextrose/ Fat Emulsion Intravenous 1,920 ml @  

80 mls/hr TPN  CONT IV  Last administered on 6/14/20at 21:35;  Start 6/14/20 at 

22:00;  Stop 6/15/20 at 21:59;  Status DC


Potassium Chloride/Water 100 ml @  100 mls/hr Q1H IV  Last administered on 

6/15/20at 08:31;  Start 6/15/20 at 07:00;  Stop 6/15/20 at 08:59;  Status DC


Potassium Acetate 40 meq/Magnesium Sulfate 5 meq/ Multivitamins 10 ml/Chromium/ 

Copper/Manganese/ Seleni/Zn 1 ml/ Insulin Human Regular 30 unit/ Total 

Parenteral Nutrition/Amino Acids/Dextrose/ Fat Emulsion Intravenous 1,920 ml @  

80 mls/hr TPN  CONT IV  Last administered on 6/15/20at 21:54;  Start 6/15/20 at 

22:00;  Stop 6/16/20 at 19:34;  Status DC


Lidocaine HCl (Buffered Lidocaine 1%) 3 ml STK-MED ONCE .ROUTE ;  Start 6/15/20 

at 12:14;  Stop 6/15/20 at 12:14;  Status DC


Lidocaine HCl (Buffered Lidocaine 1%) 3 ml 1X  ONCE IJ  Last administered on 

6/15/20at 13:11;  Start 6/15/20 at 13:00;  Stop 6/15/20 at 13:01;  Status DC


Magnesium Sulfate 50 ml @ 25 mls/hr 1X  ONCE IV ;  Start 6/16/20 at 08:15;  Stop

6/16/20 at 10:14;  Status DC


Potassium Acetate 40 meq/Magnesium Sulfate 10 meq/ Multivitamins 10 ml/Chromium/

Copper/Manganese/ Seleni/Zn 1 ml/ Insulin Human Regular 20 unit/ Total 

Parenteral Nutrition/Amino Acids/Dextrose/ Fat Emulsion Intravenous 1,920 ml @  

80 mls/hr TPN  CONT IV  Last administered on 6/16/20at 21:32;  Start 6/16/20 at 

22:00;  Stop 6/17/20 at 21:59;  Status DC


Potassium Chloride/Water 100 ml @  100 mls/hr Q1H IV  Last administered on 

6/17/20at 09:12;  Start 6/17/20 at 08:00;  Stop 6/17/20 at 09:59;  Status DC


Alteplase, Recombinant (Cathflo For Central Catheter Clearance) 4 mg 1X  ONCE 

INT CAT ;  Start 6/17/20 at 09:15;  Stop 6/17/20 at 09:16;  Status UNV


Alteplase, Recombinant (Cathflo For Central Catheter Clearance) 4 mg 1X  ONCE 

INT CAT ;  Start 6/17/20 at 09:15;  Stop 6/17/20 at 09:16;  Status UNV


Alteplase, Recombinant (Cathflo For Central Catheter Clearance) 4 mg 1X  ONCE 

INT CAT ;  Start 6/17/20 at 09:15;  Stop 6/17/20 at 09:16;  Status UNV


Alteplase, Recombinant 4 mg/ Sodium Chloride 20 ml @ 20 mls/hr 1X  ONCE IV  Last

administered on 6/17/20at 10:10;  Start 6/17/20 at 10:00;  Stop 6/17/20 at 

10:59;  Status DC


Alteplase, Recombinant 4 mg/ Sodium Chloride 20 ml @ 20 mls/hr 1X  ONCE IV  Last

administered on 6/17/20at 10:09;  Start 6/17/20 at 10:00;  Stop 6/17/20 at 

10:59;  Status DC


Alteplase, Recombinant 4 mg/ Sodium Chloride 20 ml @ 20 mls/hr 1X  ONCE IV  Last

administered on 6/17/20at 10:09;  Start 6/17/20 at 10:00;  Stop 6/17/20 at 

10:59;  Status DC


Potassium Acetate 60 meq/Magnesium Sulfate 10 meq/ Multivitamins 10 ml/Chromium/

Copper/Manganese/ Seleni/Zn 1 ml/ Insulin Human Regular 20 unit/ Total 

Parenteral Nutrition/Amino Acids/Dextrose/ Fat Emulsion Intravenous 1,920 ml @  

80 mls/hr TPN  CONT IV  Last administered on 6/17/20at 21:55;  Start 6/17/20 at 

22:00;  Stop 6/18/20 at 21:59;  Status DC


Albumin Human 500 ml @  125 mls/hr 1X  ONCE IV  Last administered on 6/18/20at 

12:01;  Start 6/18/20 at 11:15;  Stop 6/18/20 at 15:14;  Status DC


Sodium Chloride 500 ml @  500 mls/hr 1X  ONCE IV  Last administered on 6/18/20at

13:50;  Start 6/18/20 at 11:15;  Stop 6/18/20 at 12:14;  Status DC


Potassium Acetate 60 meq/Magnesium Sulfate 14 meq/ Multivitamins 10 ml/Chromium/

Copper/Manganese/ Seleni/Zn 1 ml/ Insulin Human Regular 20 unit/ Total 

Parenteral Nutrition/Amino Acids/Dextrose/ Fat Emulsion Intravenous 1,920 ml @  

80 mls/hr TPN  CONT IV  Last administered on 6/18/20at 22:26;  Start 6/18/20 at 

22:00;  Stop 6/19/20 at 21:59;  Status DC


Ciprofloxacin/ Dextrose 200 ml @  200 mls/hr Q12HR IV  Last administered on 

6/25/20at 08:27;  Start 6/18/20 at 21:00;  Stop 6/25/20 at 08:56;  Status DC


Albumin Human 250 ml @  62.5 mls/hr 1X  ONCE IV  Last administered on 6/19/20at 

11:09;  Start 6/19/20 at 11:00;  Stop 6/19/20 at 14:59;  Status DC


Furosemide (Lasix) 20 mg 1X  ONCE IVP  Last administered on 6/19/20at 14:52;  

Start 6/19/20 at 10:45;  Stop 6/19/20 at 10:49;  Status DC


Potassium Acetate 60 meq/Magnesium Sulfate 14 meq/ Multivitamins 10 ml/Chromium/

Copper/Manganese/ Seleni/Zn 1 ml/ Insulin Human Regular 15 unit/ Total 

Parenteral Nutrition/Amino Acids/Dextrose/ Fat Emulsion Intravenous 1,920 ml @  

80 mls/hr TPN  CONT IV  Last administered on 6/19/20at 22:08;  Start 6/19/20 at 

22:00;  Stop 6/20/20 at 21:59;  Status DC


Potassium Acetate 60 meq/Magnesium Sulfate 14 meq/ Multivitamins 10 ml/Chromium/

Copper/Manganese/ Seleni/Zn 1 ml/ Insulin Human Regular 15 unit/ Total 

Parenteral Nutrition/Amino Acids/Dextrose/ Fat Emulsion Intravenous 1,920 ml @  

80 mls/hr TPN  CONT IV  Last administered on 6/20/20at 22:12;  Start 6/20/20 at 

22:00;  Stop 6/21/20 at 21:59;  Status DC


Potassium Acetate 60 meq/Magnesium Sulfate 14 meq/ Multivitamins 10 ml/Chromium/

Copper/Manganese/ Seleni/Zn 1 ml/ Insulin Human Regular 15 unit/ Total 

Parenteral Nutrition/Amino Acids/Dextrose/ Fat Emulsion Intravenous 1,920 ml @  

80 mls/hr TPN  CONT IV  Last administered on 6/21/20at 22:22;  Start 6/21/20 at 

22:00;  Stop 6/22/20 at 21:59;  Status DC


Furosemide (Lasix) 20 mg 1X  ONCE IVP  Last administered on 6/22/20at 11:07;  

Start 6/22/20 at 10:30;  Stop 6/22/20 at 10:34;  Status DC


Potassium Acetate 60 meq/Magnesium Sulfate 14 meq/ Multivitamins 10 ml/Chromium/

Copper/Manganese/ Seleni/Zn 1 ml/ Insulin Human Regular 15 unit/ Sodium Chloride

20 meq/Total Parenteral Nutrition/Amino Acids/Dextrose/ Fat Emulsion Intravenous

1,920 ml @  80 mls/hr TPN  CONT IV  Last administered on 6/22/20at 21:54;  Start

6/22/20 at 22:00;  Stop 6/23/20 at 21:59;  Status DC


Potassium Acetate 30 meq/Magnesium Sulfate 14 meq/ Multivitamins 10 ml/Chromium/

Copper/Manganese/ Seleni/Zn 1 ml/ Insulin Human Regular 15 unit/ Sodium Chloride

20 meq/Potassium Chloride 30 meq/ Total Parenteral Nutrition/Amino 

Acids/Dextrose/ Fat Emulsion Intravenous 1,920 ml @  80 mls/hr TPN  CONT IV  

Last administered on 6/23/20at 21:46;  Start 6/23/20 at 22:00;  Stop 6/24/20 at 

21:59;  Status DC


Sodium Chloride 80 meq/Potassium Chloride 30 meq/ Potassium Acetate 30 

meq/Magnesium Sulfate 14 meq/ Multivitamins 10 ml/Chromium/ Copper/Manganese/ 

Seleni/Zn 1 ml/ Insulin Human Regular 15 unit/ Total Parenteral Nutrition/Amino 

Acids/Dextrose/ Fat Emulsion Intravenous 1,920 ml @  80 mls/hr TPN  CONT IV  

Last administered on 6/24/20at 22:33;  Start 6/24/20 at 22:00;  Stop 6/25/20 at 

21:59;  Status DC


Furosemide (Lasix) 40 mg 1X  ONCE IVP  Last administered on 6/24/20at 16:27;  

Start 6/24/20 at 15:30;  Stop 6/24/20 at 15:33;  Status DC


Albumin Human 250 ml @  62.5 mls/hr 1X  ONCE IV  Last administered on 6/24/20at 

16:27;  Start 6/24/20 at 15:30;  Stop 6/24/20 at 19:29;  Status DC


Sodium Chloride 80 meq/Potassium Chloride 30 meq/ Potassium Acetate 30 

meq/Magnesium Sulfate 14 meq/ Multivitamins 10 ml/Chromium/ Copper/Manganese/ 

Seleni/Zn 1 ml/ Insulin Human Regular 15 unit/ Total Parenteral Nutrition/Amino 

Acids/Dextrose/ Fat Emulsion Intravenous 1,920 ml @  80 mls/hr TPN  CONT IV  

Last administered on 6/25/20at 22:25;  Start 6/25/20 at 22:00;  Stop 6/26/20 at 

21:59;  Status DC


Sodium Chloride 80 meq/Potassium Chloride 30 meq/ Potassium Acetate 30 

meq/Magnesium Sulfate 14 meq/ Multivitamins 10 ml/Chromium/ Copper/Manganese/ 

Seleni/Zn 1 ml/ Insulin Human Regular 15 unit/ Total Parenteral Nutrition/Amino 

Acids/Dextrose/ Fat Emulsion Intravenous 1,920 ml @  80 mls/hr TPN  CONT IV  

Last administered on 6/26/20at 21:32;  Start 6/26/20 at 22:00;  Stop 6/27/20 at 

21:59;  Status DC


Sodium Chloride 80 meq/Potassium Chloride 30 meq/ Potassium Acetate 30 

meq/Magnesium Sulfate 14 meq/ Multivitamins 10 ml/Chromium/ Copper/Manganese/ 

Seleni/Zn 1 ml/ Insulin Human Regular 15 unit/ Total Parenteral Nutrition/Amino 

Acids/Dextrose/ Fat Emulsion Intravenous 1,920 ml @  80 mls/hr TPN  CONT IV  

Last administered on 6/27/20at 21:53;  Start 6/27/20 at 22:00;  Stop 6/28/20 at 

21:59;  Status DC


Acetylcysteine (Mucomyst 20% Resp Treatment) 600 mg RTBID NEB  Last administered

on 7/7/20at 08:00;  Start 6/27/20 at 12:00


Sodium Chloride 80 meq/Potassium Chloride 30 meq/ Potassium Acetate 30 

meq/Magnesium Sulfate 14 meq/ Multivitamins 10 ml/Chromium/ Copper/Manganese/ 

Seleni/Zn 1 ml/ Insulin Human Regular 15 unit/ Total Parenteral Nutrition/Amino 

Acids/Dextrose/ Fat Emulsion Intravenous 1,920 ml @  80 mls/hr TPN  CONT IV  

Last administered on 6/28/20at 22:06;  Start 6/28/20 at 22:00;  Stop 6/29/20 at 

21:59;  Status DC


Meropenem 500 mg/ Sodium Chloride 50 ml @  100 mls/hr Q6HRS IV  Last 

administered on 7/7/20at 12:22;  Start 6/28/20 at 18:00


Daptomycin 500 mg/ Sodium Chloride 50 ml @  100 mls/hr Q24H IV  Last 

administered on 7/6/20at 21:47;  Start 6/28/20 at 19:00;  Stop 7/7/20 at 08:13; 

Status DC


Sodium Chloride 80 meq/Potassium Chloride 30 meq/ Potassium Acetate 30 

meq/Magnesium Sulfate 14 meq/ Multivitamins 10 ml/Chromium/ Copper/Manganese/ 

Seleni/Zn 1 ml/ Insulin Human Regular 15 unit/ Total Parenteral Nutrition/Amino 

Acids/Dextrose/ Fat Emulsion Intravenous 1,920 ml @  80 mls/hr TPN  CONT IV  

Last administered on 6/29/20at 22:09;  Start 6/29/20 at 22:00;  Stop 6/30/20 at 

21:59;  Status DC


Heparin Sodium (Porcine) 1000 unit/Sodium Chloride 1,001 ml @  1,001 mls/hr 1X  

ONCE IRR ;  Start 6/30/20 at 06:00;  Stop 6/30/20 at 06:59;  Status DC


Propofol (Diprivan) 200 mg STK-MED ONCE IV ;  Start 6/30/20 at 07:44;  Stop 

6/30/20 at 07:44;  Status DC


Lidocaine HCl (Lidocaine Pf 2% Vial) 5 ml STK-MED ONCE .ROUTE ;  Start 6/30/20 

at 07:44;  Stop 6/30/20 at 07:44;  Status DC


Fentanyl Citrate (Fentanyl 2ml Vial) 100 mcg STK-MED ONCE .ROUTE ;  Start 

6/30/20 at 07:44;  Stop 6/30/20 at 07:44;  Status DC


Rocuronium Bromide (Zemuron) 100 mg STK-MED ONCE .ROUTE ;  Start 6/30/20 at 

07:44;  Stop 6/30/20 at 07:44;  Status DC


Micafungin Sodium 100 mg/Dextrose 100 ml @  100 mls/hr Q24H IV  Last 

administered on 7/7/20at 08:33;  Start 6/30/20 at 08:30


Bupivacaine HCl/ Epinephrine Bitart (Sensorcain-Epi 0.5%-1:065424 Mpf) 30 ml 

STK-MED ONCE .ROUTE ;  Start 6/30/20 at 08:34;  Stop 6/30/20 at 08:35;  Status 

DC


Iohexol (Omnipaque 300 Mg/ml) 50 ml STK-MED ONCE .ROUTE  Last administered on 

6/30/20at 13:30;  Start 6/30/20 at 08:35;  Stop 6/30/20 at 08:35;  Status DC


Sodium Chloride 80 meq/Potassium Chloride 30 meq/ Potassium Acetate 30 

meq/Magnesium Sulfate 14 meq/ Multivitamins 10 ml/Chromium/ Copper/Manganese/ 

Seleni/Zn 1 ml/ Insulin Human Regular 15 unit/ Total Parenteral Nutrition/Amino 

Acids/Dextrose/ Fat Emulsion Intravenous 1,920 ml @  80 mls/hr TPN  CONT IV  

Last administered on 7/1/20at 01:22;  Start 6/30/20 at 22:00;  Stop 7/1/20 at 

21:59;  Status DC


Phenylephrine HCl (Ken-Synephrine Inj) 10 mg STK-MED ONCE .ROUTE ;  Start 

6/30/20 at 10:15;  Stop 6/30/20 at 10:15;  Status DC


Desflurane (Suprane) 90 ml STK-MED ONCE IH ;  Start 6/30/20 at 10:18;  Stop 

6/30/20 at 10:19;  Status DC


Albumin Human 500 ml @  As Directed STK-MED ONCE IV ;  Start 6/30/20 at 11:06;  

Stop 6/30/20 at 11:06;  Status DC


Vasopressin (Vasostrict) 20 unit STK-MED ONCE .ROUTE ;  Start 6/30/20 at 12:23; 

Stop 6/30/20 at 12:23;  Status DC


Phenylephrine HCl (Ken-Synephrine Inj) 10 mg STK-MED ONCE .ROUTE ;  Start 

6/30/20 at 13:33;  Stop 6/30/20 at 13:33;  Status DC


Phenylephrine HCl (Ken-Synephrine Inj) 10 mg STK-MED ONCE .ROUTE ;  Start 

6/30/20 at 13:33;  Stop 6/30/20 at 13:33;  Status DC


Ondansetron HCl (Zofran) 4 mg STK-MED ONCE .ROUTE ;  Start 6/30/20 at 13:33;  

Stop 6/30/20 at 13:33;  Status DC


Enoxaparin Sodium (Lovenox 40mg Syringe) 40 mg Q24H SQ  Last administered on 

7/7/20at 08:33;  Start 7/1/20 at 08:00


Sodium Chloride (Normal Saline Flush) 3 ml QSHIFT  PRN IV AFTER MEDS AND BLOOD 

DRAWS;  Start 6/30/20 at 14:45


Naloxone HCl (Narcan) 0.4 mg PRN Q2MIN  PRN IV SEE INSTRUCTIONS;  Start 6/30/20 

at 14:45


Sodium Chloride 1,000 ml @  25 mls/hr Q24H IV  Last administered on 7/4/20at 

12:37;  Start 6/30/20 at 14:33


Morphine Sulfate (Morphine Sulfate) 1 mg PRN Q1HR  PRN IV PAIN;  Start 6/30/20 

at 14:45


Midazolam HCl 100 mg/Sodium Chloride 100 ml @ 1 mls/hr CONT  PRN IV SEE I/O 

RECORD Last administered on 7/3/20at 18:48;  Start 6/30/20 at 14:45


Phenylephrine HCl (PHENYLEPHRINE in 0.9% NACL PF) 1 mg STK-MED ONCE IV ;  Start 

6/30/20 at 14:44;  Stop 6/30/20 at 14:45;  Status DC


Ephedrine Sulfate (ePHEDrine PF IN SALINE SYRINGE) 50 mg STK-MED ONCE IV ;  

Start 6/30/20 at 14:45;  Stop 6/30/20 at 14:45;  Status DC


Vasopressin 20 unit/Dextrose 101 ml @  12 mls/hr CONT  PRN IV SEE I/O RECORD 

Last administered on 7/7/20at 04:17;  Start 6/30/20 at 15:30


Sodium Chloride 1,000 ml @  1,000 mls/hr 1X  ONCE IV  Last administered on 

6/30/20at 15:42;  Start 6/30/20 at 15:45;  Stop 6/30/20 at 16:44;  Status DC


Albumin Human 500 ml @  125 mls/hr 1X  ONCE IV ;  Start 6/30/20 at 16:00;  Stop 

6/30/20 at 19:59;  Status DC


Albumin Human 500 ml @  125 mls/hr PRN Q1HR  PRN IV PER PROTOCOL;  Start 6/30/20

at 15:45


Magnesium Sulfate 50 ml @ 25 mls/hr 1X  ONCE IV  Last administered on 6/30/20at 

17:02;  Start 6/30/20 at 16:30;  Stop 6/30/20 at 18:29;  Status DC


Sodium Bicarbonate (Sodium Bicarb Adult 8.4% Syr) 50 meq STK-MED ONCE .ROUTE ;  

Start 6/30/20 at 16:20;  Stop 6/30/20 at 16:20;  Status DC


Sodium Bicarbonate (Sodium Bicarb Adult 8.4% Syr) 100 meq 1X  ONCE IV  Last 

administered on 6/30/20at 17:07;  Start 6/30/20 at 16:30;  Stop 6/30/20 at 

16:31;  Status DC


Sodium Bicarbonate 150 meq/Dextrose 1,150 ml @  75 mls/hr 1X  ONCE IV  Last 

administered on 6/30/20at 20:02;  Start 6/30/20 at 16:30;  Stop 7/1/20 at 07:49;

 Status DC


Sodium Chloride 80 meq/Potassium Chloride 30 meq/ Potassium Acetate 30 

meq/Magnesium Sulfate 14 meq/ Multivitamins 10 ml/Chromium/ Copper/Manganese/ 

Seleni/Zn 1 ml/ Insulin Human Regular 15 unit/ Total Parenteral Nutrition/Amino 

Acids/Dextrose/ Fat Emulsion Intravenous 1,920 ml @  80 mls/hr TPN  CONT IV  

Last administered on 7/1/20at 23:05;  Start 7/1/20 at 22:00;  Stop 7/2/20 at 

21:59;  Status DC


Sodium Chloride 100 meq/Potassium Chloride 30 meq/ Potassium Acetate 30 

meq/Magnesium Sulfate 12 meq/ Multivitamins 10 ml/Chromium/ Copper/Manganese/ 

Seleni/Zn 1 ml/ Insulin Human Regular 15 unit/ Total Parenteral Nutrition/Amino 

Acids/Dextrose/ Fat Emulsion Intravenous 1,920 ml @  80 mls/hr TPN  CONT IV  

Last administered on 7/2/20at 21:52;  Start 7/2/20 at 22:00;  Stop 7/3/20 at 

21:59;  Status DC


Sodium Chloride 100 meq/Potassium Chloride 30 meq/ Potassium Acetate 30 

meq/Magnesium Sulfate 12 meq/ Multivitamins 10 ml/Chromium/ Copper/Manganese/ 

Seleni/Zn 1 ml/ Insulin Human Regular 15 unit/ Total Parenteral Nutrition/Amino 

Acids/Dextrose/ Fat Emulsion Intravenous 1,920 ml @  80 mls/hr TPN  CONT IV  

Last administered on 7/3/20at 21:46;  Start 7/3/20 at 22:00;  Stop 7/4/20 at 

21:59;  Status DC


Sodium Chloride 100 meq/Potassium Chloride 30 meq/ Potassium Acetate 30 

meq/Magnesium Sulfate 12 meq/ Multivitamins 10 ml/Chromium/ Copper/Manganese/ 

Seleni/Zn 1 ml/ Insulin Human Regular 15 unit/ Total Parenteral Nutrition/Amino 

Acids/Dextrose/ Fat Emulsion Intravenous 1,800 ml @  75 mls/hr TPN  CONT IV  

Last administered on 7/4/20at 22:04;  Start 7/4/20 at 22:00;  Stop 7/5/20 at 

21:59;  Status DC


Fentanyl Citrate 55 ml @ 0 mls/hr CONT  PRN IV SEE COMMENTS Last administered on

7/6/20at 23:55;  Start 7/4/20 at 13:00


Sodium Chloride 100 meq/Potassium Chloride 30 meq/ Potassium Acetate 30 

meq/Magnesium Sulfate 12 meq/ Multivitamins 10 ml/Chromium/ Copper/Manganese/ 

Seleni/Zn 1 ml/ Insulin Human Regular 15 unit/ Total Parenteral Nutrition/Amino 

Acids/Dextrose/ Fat Emulsion Intravenous 1,680 ml @  70 mls/hr TPN  CONT IV  

Last administered on 7/5/20at 21:23;  Start 7/5/20 at 22:00;  Stop 7/6/20 at 

21:59;  Status DC


Sodium Chloride 110 meq/Potassium Chloride 30 meq/ Potassium Acetate 30 

meq/Magnesium Sulfate 15 meq/ Multivitamins 10 ml/Chromium/ Copper/Manganese/ 

Seleni/Zn 1 ml/ Insulin Human Regular 15 unit/ Total Parenteral Nutrition/Amino 

Acids/Dextrose/ Fat Emulsion Intravenous 1,680 ml @  70 mls/hr TPN  CONT IV  

Last administered on 7/6/20at 21:48;  Start 7/6/20 at 22:00;  Stop 7/7/20 at 

21:59


Sodium Chloride 110 meq/Potassium Chloride 30 meq/ Potassium Acetate 30 

meq/Magnesium Sulfate 15 meq/ Multivitamins 10 ml/Chromium/ Copper/Manganese/ 

Seleni/Zn 1 ml/ Insulin Human Regular 15 unit/ Total Parenteral Nutrition/Amino 

Acids/Dextrose/ Fat Emulsion Intravenous 1,680 ml @  70 mls/hr TPN  CONT IV ;  

Start 7/7/20 at 22:00;  Stop 7/8/20 at 21:59





Active Scripts


Active


Reported


Bisoprolol Fumarate 5 Mg Tablet 10 Mg PO DAILY


Vitals/I & O





Vital Sign - Last 24 Hours








 7/6/20 7/6/20 7/6/20 7/6/20





 15:00 15:28 16:00 16:00


 


Temp   98.4 





   98.4 


 


Pulse 78  100 


 


Resp 22  22 


 


B/P (MAP) 121/70 (87)  124/76 (92) 


 


Pulse Ox 100 99 100 


 


O2 Delivery Ventilator Ventilator Ventilator Mechanical Ventilator


 


    





    





 7/6/20 7/6/20 7/6/20 7/6/20





 17:00 17:24 18:00 19:00


 


Pulse 84  82 78


 


Resp 22  22 14


 


B/P (MAP) 104/52 (69)  106/58 (74) 96/63 (74)


 


Pulse Ox 100 99 100 100


 


O2 Delivery Ventilator Ventilator Ventilator Ventilator





 7/6/20 7/6/20 7/6/20 7/6/20





 19:54 19:58 20:00 20:00


 


Temp   98.0 





   98.0 


 


Pulse   80 


 


Resp   20 


 


B/P (MAP)   111/70 (84) 


 


Pulse Ox 99 100 100 


 


O2 Delivery Ventilator Ventilator Ventilator Mechanical Ventilator


 


    





    





 7/6/20 7/6/20 7/6/20 7/6/20





 21:00 22:00 23:00 23:51


 


Pulse 92 89 88 


 


Resp 22 25 16 


 


B/P (MAP) 165/97 (119) 149/84 (105) 156/100 (118) 


 


Pulse Ox 100 100 100 100


 


O2 Delivery Ventilator Ventilator Ventilator Ventilator





 7/6/20 7/7/20 7/7/20 7/7/20





 23:55 00:00 00:01 00:25


 


Temp   100.1 





   100.1 


 


Pulse   121 


 


Resp 25  23 15


 


B/P (MAP)   141/77 (98) 


 


Pulse Ox 100  100 100


 


O2 Delivery  Mechanical Ventilator Ventilator Ventilator


 


O2 Flow Rate 40.0   40.0


 


    





    





 7/7/20 7/7/20 7/7/20 7/7/20





 01:00 02:00 03:00 03:48


 


Pulse 114 80 110 


 


Resp 14 12 16 


 


B/P (MAP) 98/51 (67) 93/51 (65) 129/83 (98) 


 


Pulse Ox 100 99 95 100


 


O2 Delivery Ventilator Ventilator Ventilator Ventilator





 7/7/20 7/7/20 7/7/20 7/7/20





 04:00 04:00 05:00 06:00


 


Temp  98.1  





  98.1  


 


Pulse  96 97 79


 


Resp  19 16 24


 


B/P (MAP)  132/68 (89) 109/73 (85) 134/92 (106)


 


Pulse Ox  95 100 100


 


O2 Delivery Mechanical Ventilator Ventilator Ventilator Ventilator


 


    





    





 7/7/20 7/7/20 7/7/20 7/7/20





 07:00 08:00 08:00 09:00


 


Temp  97.8  





  97.8  


 


Pulse 91 98  91


 


Resp 19 14  14


 


B/P (MAP) 144/99 (114) 133/76 (95)  118/77 (91)


 


Pulse Ox 100 98  100


 


O2 Delivery Ventilator Ventilator Mechanical Ventilator Ventilator


 


    





    





 7/7/20 7/7/20 7/7/20 7/7/20





 09:00 10:00 11:00 12:00


 


Temp    96.9





    96.9


 


Pulse  86 105 112


 


Resp  14 14 14


 


B/P (MAP)  131/73 (92) 109/56 (73) 122/79 (93)


 


Pulse Ox 100 100 100 99


 


O2 Delivery Ventilator Ventilator Ventilator Ventilator


 


    





    





 7/7/20 7/7/20 7/7/20 7/7/20





 12:00 12:05 13:00 14:00


 


Pulse   107 103


 


Resp   14 14


 


B/P (MAP)   111/66 (81) 119/76 (90)


 


Pulse Ox  100 100 100


 


O2 Delivery Mechanical Ventilator Ventilator Ventilator Ventilator














Intake and Output   


 


 7/6/20 7/6/20 7/7/20





 15:00 23:00 07:00


 


Intake Total 300 ml 1743 ml 1430.4 ml


 


Output Total 560 ml 1405 ml 1380 ml


 


Balance -260 ml 338 ml 50.4 ml











Justicifation of Admission Dx:


Justifications for Admission:


Justification of Admission Dx:  Yes











MG ARGUETA MD                  Jul 7, 2020 14:08

## 2020-07-07 NOTE — PDOC
SURGICAL PROGRESS NOTE


Subjective


Pt awake on vent


Vital Signs





Vital Signs








  Date Time  Temp Pulse Resp B/P (MAP) Pulse Ox O2 Delivery O2 Flow Rate FiO2


 


7/7/20 09:00     100 Ventilator  


 


7/7/20 09:00  91 14 118/77 (91)    


 


7/7/20 08:00 97.8       





 97.8       


 


7/7/20 00:25       40.0 








I&O











Intake and Output 


 


 7/7/20





 07:00


 


Intake Total 3473.4 ml


 


Output Total 3345 ml


 


Balance 128.4 ml


 


 


 


IV Total 2532.4 ml


 


Tube Feeding 691 ml


 


Blood Product IV Normal Saline Flush 100 ml


 


Other 150 ml


 


Output Urine Total 1670 ml


 


Chest Tube Drainage Total 80 ml


 


Drainage Total 1595 ml








PATIENT HAS A VILLASENOR:  Yes (accurate i and os)


General:  Alert, No acute distress


Abdomen:  Soft, Other (tubes in place, G-tube portion with bilious output, santosh J

tube feeds, various drains with decreasing output)


Labs





Laboratory Tests








Test


 7/5/20


12:49 7/5/20


18:06 7/5/20


23:53 7/6/20


06:15


 


Glucose (Fingerstick)


 169 mg/dL


(70-99) 132 mg/dL


(70-99) 147 mg/dL


(70-99) 





 


White Blood Count


 


 


 


 18.0 x10^3/uL


(4.0-11.0)


 


Red Blood Count


 


 


 


 2.62 x10^6/uL


(3.50-5.40)


 


Hemoglobin


 


 


 


 7.7 g/dL


(12.0-15.5)


 


Hematocrit


 


 


 


 23.4 %


(36.0-47.0)


 


Mean Corpuscular Volume    89 fL () 


 


Mean Corpuscular Hemoglobin    30 pg (25-35) 


 


Mean Corpuscular Hemoglobin


Concent 


 


 


 33 g/dL


(31-37)


 


Red Cell Distribution Width


 


 


 


 14.5 %


(11.5-14.5)


 


Platelet Count


 


 


 


 375 x10^3/uL


(140-400)


 


Neutrophils (%) (Auto)    86 % (31-73) 


 


Lymphocytes (%) (Auto)    7 % (24-48) 


 


Monocytes (%) (Auto)    6 % (0-9) 


 


Eosinophils (%) (Auto)    1 % (0-3) 


 


Basophils (%) (Auto)    0 % (0-3) 


 


Neutrophils # (Auto)


 


 


 


 15.5 x10^3/uL


(1.8-7.7)


 


Lymphocytes # (Auto)


 


 


 


 1.2 x10^3/uL


(1.0-4.8)


 


Monocytes # (Auto)


 


 


 


 1.0 x10^3/uL


(0.0-1.1)


 


Eosinophils # (Auto)


 


 


 


 0.2 x10^3/uL


(0.0-0.7)


 


Basophils # (Auto)


 


 


 


 0.0 x10^3/uL


(0.0-0.2)


 


Sodium Level


 


 


 


 135 mmol/L


(136-145)


 


Potassium Level


 


 


 


 4.5 mmol/L


(3.5-5.1)


 


Chloride Level


 


 


 


 103 mmol/L


()


 


Carbon Dioxide Level


 


 


 


 29 mmol/L


(21-32)


 


Anion Gap    3 (6-14) 


 


Blood Urea Nitrogen


 


 


 


 19 mg/dL


(7-20)


 


Creatinine


 


 


 


 0.5 mg/dL


(0.6-1.0)


 


Estimated GFR


(Cockcroft-Gault) 


 


 


 131.1 





 


Glucose Level


 


 


 


 155 mg/dL


(70-99)


 


Calcium Level


 


 


 


 9.2 mg/dL


(8.5-10.1)


 


Phosphorus Level


 


 


 


 3.4 mg/dL


(2.6-4.7)


 


Magnesium Level


 


 


 


 1.8 mg/dL


(1.8-2.4)


 


Triglycerides Level


 


 


 


 207 mg/dL


(0-150)


 


Test


 7/6/20


06:21 7/6/20


11:56 7/6/20


16:51 7/7/20


00:00


 


Glucose (Fingerstick)


 151 mg/dL


(70-99) 154 mg/dL


(70-99) 153 mg/dL


(70-99) 123 mg/dL


(70-99)


 


Test


 7/7/20


05:20 7/7/20


05:32 


 





 


White Blood Count


 15.0 x10^3/uL


(4.0-11.0) 


 


 





 


Red Blood Count


 2.54 x10^6/uL


(3.50-5.40) 


 


 





 


Hemoglobin


 7.5 g/dL


(12.0-15.5) 


 


 





 


Hematocrit


 22.5 %


(36.0-47.0) 


 


 





 


Mean Corpuscular Volume 89 fL ()    


 


Mean Corpuscular Hemoglobin 30 pg (25-35)    


 


Mean Corpuscular Hemoglobin


Concent 33 g/dL


(31-37) 


 


 





 


Red Cell Distribution Width


 14.8 %


(11.5-14.5) 


 


 





 


Platelet Count


 414 x10^3/uL


(140-400) 


 


 





 


Neutrophils (%) (Auto) 86 % (31-73)    


 


Lymphocytes (%) (Auto) 7 % (24-48)    


 


Monocytes (%) (Auto) 6 % (0-9)    


 


Eosinophils (%) (Auto) 1 % (0-3)    


 


Basophils (%) (Auto) 0 % (0-3)    


 


Neutrophils # (Auto)


 12.9 x10^3/uL


(1.8-7.7) 


 


 





 


Lymphocytes # (Auto)


 1.0 x10^3/uL


(1.0-4.8) 


 


 





 


Monocytes # (Auto)


 0.9 x10^3/uL


(0.0-1.1) 


 


 





 


Eosinophils # (Auto)


 0.2 x10^3/uL


(0.0-0.7) 


 


 





 


Basophils # (Auto)


 0.0 x10^3/uL


(0.0-0.2) 


 


 





 


Glucose (Fingerstick)


 


 159 mg/dL


(70-99) 


 











Laboratory Tests








Test


 7/6/20


11:56 7/6/20


16:51 7/7/20


00:00 7/7/20


05:20


 


Glucose (Fingerstick)


 154 mg/dL


(70-99) 153 mg/dL


(70-99) 123 mg/dL


(70-99) 





 


White Blood Count


 


 


 


 15.0 x10^3/uL


(4.0-11.0)


 


Red Blood Count


 


 


 


 2.54 x10^6/uL


(3.50-5.40)


 


Hemoglobin


 


 


 


 7.5 g/dL


(12.0-15.5)


 


Hematocrit


 


 


 


 22.5 %


(36.0-47.0)


 


Mean Corpuscular Volume    89 fL () 


 


Mean Corpuscular Hemoglobin    30 pg (25-35) 


 


Mean Corpuscular Hemoglobin


Concent 


 


 


 33 g/dL


(31-37)


 


Red Cell Distribution Width


 


 


 


 14.8 %


(11.5-14.5)


 


Platelet Count


 


 


 


 414 x10^3/uL


(140-400)


 


Neutrophils (%) (Auto)    86 % (31-73) 


 


Lymphocytes (%) (Auto)    7 % (24-48) 


 


Monocytes (%) (Auto)    6 % (0-9) 


 


Eosinophils (%) (Auto)    1 % (0-3) 


 


Basophils (%) (Auto)    0 % (0-3) 


 


Neutrophils # (Auto)


 


 


 


 12.9 x10^3/uL


(1.8-7.7)


 


Lymphocytes # (Auto)


 


 


 


 1.0 x10^3/uL


(1.0-4.8)


 


Monocytes # (Auto)


 


 


 


 0.9 x10^3/uL


(0.0-1.1)


 


Eosinophils # (Auto)


 


 


 


 0.2 x10^3/uL


(0.0-0.7)


 


Basophils # (Auto)


 


 


 


 0.0 x10^3/uL


(0.0-0.2)


 


Test


 7/7/20


05:32 


 


 





 


Glucose (Fingerstick)


 159 mg/dL


(70-99) 


 


 











Problem List


Problems


Medical Problems:


(1) Acute pancreatitis


Status: Acute  





(2) Cholelithiasis


Status: Acute  








Assessment/Plan


s/p xlap


cont supportive care


cont tube feeds.





Justicifation of Admission Dx:


Justifications for Admission:


Justification of Admission Dx:  Yes











GAMAL ZHOU MD              Jul 7, 2020 09:50

## 2020-07-07 NOTE — NUR
SS following up with discharge planning. SS reviewed pt chart and discussed with pt RN. Pt 
remains on the vent at this time. Pt on TPN, IV Micafungin, and IV Meropenem. Pt has J Tube 
and all drains remain. Pt off Vasopressin. SS will continue to follow for discharge 
planning.

## 2020-07-07 NOTE — PDOC
PROGRESS NOTES


Chief Complaint


Chief Complaint


late entry, pt seen 7.6,  janel, discussed wtih RN, then the computers went down





Acute hypoxic Respiratory failure required  mechanical ventilation


Tracheostomy


bilateral pleural effusions/pulm edema s/p Throacentesis on 6/15/2020


Severe Acute gallstone pancreatitis (not a surgical candidate at this time) with

necrosis


Acute kidney failure now requiring dialysis


Gallstones (Calculus of gallbladder with acute cholecystitis without 

obstruction)


HTN 


Intractable pain


Intractable nausea


Covid 19 negative. 


Acute on chronic anemia 


EEG: No seizure activityFever  - better currently - intermittent could be from 

underlying pancreatitis blood cults 5/4 - neg so far


? Ileus with vomiting


Abd distention - U/S and CT reviewed s/p 0.4 L of opaque, debris-containing 

ascites was removed 5/6


Acute pancreatitis with persistent necrosis


Gallstone pancreatitis with necrosis. 


   -CT A/P 6/6 showed multiple pseudocysts, slight larger on the right. s/p 

drains x 3, 6/7.  + PSAE (MDRO-R Cefepime, Zosyn ANSON < 64) and yeast, 


   -s/p drain 4/27. C. parapsilosis. s/p drain 5/6 + yeast & high amylase; s/p 

additional drain on 5/8. Drains removed. 


Ascites s/p paracentesis 4/15 & 5/6. C. parapsilosis 


JED. off HD. 


A large fluid collection in the pancreatic bed has slightly decreased in size, 

described below, the pancreas itself is difficult to  visualize, which could be 

due to necrosis or obscuration of pancreatic  parenchyma from the surrounding 

fluid collection.6/15 


- 4/27 status post ROBERT drain placement + C paropsilosis. s/p additional drains 

5/8


Anemia - S/p PRBCs


Cholelithiasis with thickening of the gallbladder wall.


Leucocytosis improving


JED, hyperkalemia, Metabolic acidosis off dialysis


hypocalcemia 


Prediabetes


HTN


s/p trach


ESRD on HD


Hyperglycemia


severe protein-caloric malnutrition


Moderate to large left pleural effusion with atelectasis and collapse  of most 

of the left lower lobe, stable


 


Dispo - ICU, critically ill


Poor prognosis





History of Present Illness


History of Present Illness


7/6.    seen and evaluated,   still with marked drainage


tube cont





7/5


cont current


prognosis poor,





Vitals


Vitals





Vital Signs








  Date Time  Temp Pulse Resp B/P (MAP) Pulse Ox O2 Delivery O2 Flow Rate FiO2


 


7/7/20 07:00  91 19 144/99 (114) 100 Ventilator  


 


7/7/20 04:00 98.1       





 98.1       


 


7/7/20 00:25       40.0 











Physical Exam


Physical Exam


GENERAL: Alert but not responsive. ill appearing


HEENT: Pupils equal, oral cavity dry. + NGT


NECK:  Tracheostomy 


LUNGS: Diminished aeration bases,  CT on left 


HEART:  S1, S2, regular w/ PVCs 


ABDOMEN: Sightly Distended,  bowel sounds hypoactive, soft, richardson x 2, 3 ROBERT 

drains, G-J tube and + wound vac 


: [place] in place 


EXTREMITIES: Generalized edema, no cyanosis. SCDs & Podus boots bilaterally  


SKIN: warm touch. No signs of rash.  


LUE-PICC without signs of complications 


LUE art-line out, mottling left forearm about ole art-line site is improving, 

some better. RP palpable, cap refill brisk.


NEURO: alert but not responsive - ? tracking


General:  Cooperative, No acute distress


Heart:  Regular rate (SR/ST), Other (distant heart sounds)


Lungs:  Crackles


Abdomen:  Soft, Other (drains in place)


Extremities:  Other (Diffuse edema)


Skin:  No rashes, No significant lesion





Labs


LABS





Laboratory Tests








Test


 7/6/20


11:56 7/6/20


16:51 7/7/20


00:00 7/7/20


05:20


 


Glucose (Fingerstick)


 154 mg/dL


(70-99) 153 mg/dL


(70-99) 123 mg/dL


(70-99) 





 


White Blood Count


 


 


 


 15.0 x10^3/uL


(4.0-11.0)


 


Red Blood Count


 


 


 


 2.54 x10^6/uL


(3.50-5.40)


 


Hemoglobin


 


 


 


 7.5 g/dL


(12.0-15.5)


 


Hematocrit


 


 


 


 22.5 %


(36.0-47.0)


 


Mean Corpuscular Volume    89 fL () 


 


Mean Corpuscular Hemoglobin    30 pg (25-35) 


 


Mean Corpuscular Hemoglobin


Concent 


 


 


 33 g/dL


(31-37)


 


Red Cell Distribution Width


 


 


 


 14.8 %


(11.5-14.5)


 


Platelet Count


 


 


 


 414 x10^3/uL


(140-400)


 


Neutrophils (%) (Auto)    86 % (31-73) 


 


Lymphocytes (%) (Auto)    7 % (24-48) 


 


Monocytes (%) (Auto)    6 % (0-9) 


 


Eosinophils (%) (Auto)    1 % (0-3) 


 


Basophils (%) (Auto)    0 % (0-3) 


 


Neutrophils # (Auto)


 


 


 


 12.9 x10^3/uL


(1.8-7.7)


 


Lymphocytes # (Auto)


 


 


 


 1.0 x10^3/uL


(1.0-4.8)


 


Monocytes # (Auto)


 


 


 


 0.9 x10^3/uL


(0.0-1.1)


 


Eosinophils # (Auto)


 


 


 


 0.2 x10^3/uL


(0.0-0.7)


 


Basophils # (Auto)


 


 


 


 0.0 x10^3/uL


(0.0-0.2)


 


Test


 7/7/20


05:32 


 


 





 


Glucose (Fingerstick)


 159 mg/dL


(70-99) 


 


 














Assessment and Plan


Assessmemt and Plan


Problems


Medical Problems:


(1) Acute pancreatitis


Status: Acute  





(2) Cholelithiasis


Status: Acute  











Comment


Review of Relevant


I have reviewed the following items josy (where applicable) has been applied.


Labs





Laboratory Tests








Test


 7/5/20


12:49 7/5/20


18:06 7/5/20


23:53 7/6/20


06:15


 


Glucose (Fingerstick)


 169 mg/dL


(70-99) 132 mg/dL


(70-99) 147 mg/dL


(70-99) 





 


White Blood Count


 


 


 


 18.0 x10^3/uL


(4.0-11.0)


 


Red Blood Count


 


 


 


 2.62 x10^6/uL


(3.50-5.40)


 


Hemoglobin


 


 


 


 7.7 g/dL


(12.0-15.5)


 


Hematocrit


 


 


 


 23.4 %


(36.0-47.0)


 


Mean Corpuscular Volume    89 fL () 


 


Mean Corpuscular Hemoglobin    30 pg (25-35) 


 


Mean Corpuscular Hemoglobin


Concent 


 


 


 33 g/dL


(31-37)


 


Red Cell Distribution Width


 


 


 


 14.5 %


(11.5-14.5)


 


Platelet Count


 


 


 


 375 x10^3/uL


(140-400)


 


Neutrophils (%) (Auto)    86 % (31-73) 


 


Lymphocytes (%) (Auto)    7 % (24-48) 


 


Monocytes (%) (Auto)    6 % (0-9) 


 


Eosinophils (%) (Auto)    1 % (0-3) 


 


Basophils (%) (Auto)    0 % (0-3) 


 


Neutrophils # (Auto)


 


 


 


 15.5 x10^3/uL


(1.8-7.7)


 


Lymphocytes # (Auto)


 


 


 


 1.2 x10^3/uL


(1.0-4.8)


 


Monocytes # (Auto)


 


 


 


 1.0 x10^3/uL


(0.0-1.1)


 


Eosinophils # (Auto)


 


 


 


 0.2 x10^3/uL


(0.0-0.7)


 


Basophils # (Auto)


 


 


 


 0.0 x10^3/uL


(0.0-0.2)


 


Sodium Level


 


 


 


 135 mmol/L


(136-145)


 


Potassium Level


 


 


 


 4.5 mmol/L


(3.5-5.1)


 


Chloride Level


 


 


 


 103 mmol/L


()


 


Carbon Dioxide Level


 


 


 


 29 mmol/L


(21-32)


 


Anion Gap    3 (6-14) 


 


Blood Urea Nitrogen


 


 


 


 19 mg/dL


(7-20)


 


Creatinine


 


 


 


 0.5 mg/dL


(0.6-1.0)


 


Estimated GFR


(Cockcroft-Gault) 


 


 


 131.1 





 


Glucose Level


 


 


 


 155 mg/dL


(70-99)


 


Calcium Level


 


 


 


 9.2 mg/dL


(8.5-10.1)


 


Phosphorus Level


 


 


 


 3.4 mg/dL


(2.6-4.7)


 


Magnesium Level


 


 


 


 1.8 mg/dL


(1.8-2.4)


 


Triglycerides Level


 


 


 


 207 mg/dL


(0-150)


 


Test


 7/6/20


06:21 7/6/20


11:56 7/6/20


16:51 7/7/20


00:00


 


Glucose (Fingerstick)


 151 mg/dL


(70-99) 154 mg/dL


(70-99) 153 mg/dL


(70-99) 123 mg/dL


(70-99)


 


Test


 7/7/20


05:20 7/7/20


05:32 


 





 


White Blood Count


 15.0 x10^3/uL


(4.0-11.0) 


 


 





 


Red Blood Count


 2.54 x10^6/uL


(3.50-5.40) 


 


 





 


Hemoglobin


 7.5 g/dL


(12.0-15.5) 


 


 





 


Hematocrit


 22.5 %


(36.0-47.0) 


 


 





 


Mean Corpuscular Volume 89 fL ()    


 


Mean Corpuscular Hemoglobin 30 pg (25-35)    


 


Mean Corpuscular Hemoglobin


Concent 33 g/dL


(31-37) 


 


 





 


Red Cell Distribution Width


 14.8 %


(11.5-14.5) 


 


 





 


Platelet Count


 414 x10^3/uL


(140-400) 


 


 





 


Neutrophils (%) (Auto) 86 % (31-73)    


 


Lymphocytes (%) (Auto) 7 % (24-48)    


 


Monocytes (%) (Auto) 6 % (0-9)    


 


Eosinophils (%) (Auto) 1 % (0-3)    


 


Basophils (%) (Auto) 0 % (0-3)    


 


Neutrophils # (Auto)


 12.9 x10^3/uL


(1.8-7.7) 


 


 





 


Lymphocytes # (Auto)


 1.0 x10^3/uL


(1.0-4.8) 


 


 





 


Monocytes # (Auto)


 0.9 x10^3/uL


(0.0-1.1) 


 


 





 


Eosinophils # (Auto)


 0.2 x10^3/uL


(0.0-0.7) 


 


 





 


Basophils # (Auto)


 0.0 x10^3/uL


(0.0-0.2) 


 


 





 


Glucose (Fingerstick)


 


 159 mg/dL


(70-99) 


 











Laboratory Tests








Test


 7/6/20


11:56 7/6/20


16:51 7/7/20


00:00 7/7/20


05:20


 


Glucose (Fingerstick)


 154 mg/dL


(70-99) 153 mg/dL


(70-99) 123 mg/dL


(70-99) 





 


White Blood Count


 


 


 


 15.0 x10^3/uL


(4.0-11.0)


 


Red Blood Count


 


 


 


 2.54 x10^6/uL


(3.50-5.40)


 


Hemoglobin


 


 


 


 7.5 g/dL


(12.0-15.5)


 


Hematocrit


 


 


 


 22.5 %


(36.0-47.0)


 


Mean Corpuscular Volume    89 fL () 


 


Mean Corpuscular Hemoglobin    30 pg (25-35) 


 


Mean Corpuscular Hemoglobin


Concent 


 


 


 33 g/dL


(31-37)


 


Red Cell Distribution Width


 


 


 


 14.8 %


(11.5-14.5)


 


Platelet Count


 


 


 


 414 x10^3/uL


(140-400)


 


Neutrophils (%) (Auto)    86 % (31-73) 


 


Lymphocytes (%) (Auto)    7 % (24-48) 


 


Monocytes (%) (Auto)    6 % (0-9) 


 


Eosinophils (%) (Auto)    1 % (0-3) 


 


Basophils (%) (Auto)    0 % (0-3) 


 


Neutrophils # (Auto)


 


 


 


 12.9 x10^3/uL


(1.8-7.7)


 


Lymphocytes # (Auto)


 


 


 


 1.0 x10^3/uL


(1.0-4.8)


 


Monocytes # (Auto)


 


 


 


 0.9 x10^3/uL


(0.0-1.1)


 


Eosinophils # (Auto)


 


 


 


 0.2 x10^3/uL


(0.0-0.7)


 


Basophils # (Auto)


 


 


 


 0.0 x10^3/uL


(0.0-0.2)


 


Test


 7/7/20


05:32 


 


 





 


Glucose (Fingerstick)


 159 mg/dL


(70-99) 


 


 











Microbiology


6/30/20 Gram Stain - Final, Complete


          


6/30/20 Aerobic and Anaerobic Culture - Final, Complete


          


6/30/20 Antimicrobic Susceptibility - Final, Complete


          


6/28/20 Blood Culture - Final, Complete


          NO GROWTH AFTER 5 DAYS


6/15/20 Gram Stain - Final, Complete


          


6/15/20 Aerobic and Anaerobic Culture - Final, Complete


          


6/13/20 Gram Stain Evaluation - Final, Complete


          


6/13/20 Respiratory Culture - Final, Complete


          


6/13/20 Antimicrobic Susceptibility - Final, Complete


          


6/7/20 Urine Culture - Final, Complete


         


5/30/20 Gram Stain - Final, Complete


          


5/30/20 Aerobic Culture - Final, Complete


Medications





Current Medications


Sodium Chloride 1,000 ml @  1,000 mls/hr Q1H IV  Last administered on 3/16/20at 

03:00;  Start 3/16/20 at 03:00;  Stop 3/16/20 at 03:59;  Status DC


Ondansetron HCl (Zofran) 4 mg 1X  ONCE IVP  Last administered on 3/16/20at 

03:27;  Start 3/16/20 at 03:00;  Stop 3/16/20 at 03:01;  Status DC


Morphine Sulfate (Morphine Sulfate) 4 mg 1X  ONCE IV ;  Start 3/16/20 at 03:00; 

Stop 3/16/20 at 03:01;  Status Cancel


Ketorolac Tromethamine (Toradol 30mg Vial) 30 mg 1X  ONCE IV  Last administered 

on 3/16/20at 02:54;  Start 3/16/20 at 03:00;  Stop 3/16/20 at 03:01;  Status DC


Fentanyl Citrate (Fentanyl 2ml Vial) 25 mcg 1X  ONCE IVP  Last administered on 

3/16/20at 03:23;  Start 3/16/20 at 03:30;  Stop 3/16/20 at 03:31;  Status DC


Fentanyl Citrate (Fentanyl 2ml Vial) 100 mcg STK-MED ONCE .ROUTE ;  Start 

3/16/20 at 03:18;  Stop 3/16/20 at 03:18;  Status DC


Iohexol (Omnipaque 350 Mg/ml) 90 ml 1X  ONCE IV  Last administered on 3/16/20at 

03:25;  Start 3/16/20 at 03:30;  Stop 3/16/20 at 03:31;  Status DC


Info (CONTRAST GIVEN -- Rx MONITORING) 1 each PRN DAILY  PRN MC SEE COMMENTS;  

Start 3/16/20 at 03:30;  Stop 3/18/20 at 03:29;  Status DC


Hydromorphone HCl (Dilaudid) 0.5 mg 1X  ONCE IV  Last administered on 3/16/20at 

03:55;  Start 3/16/20 at 04:30;  Stop 3/16/20 at 04:32;  Status DC


Ondansetron HCl (Zofran) 4 mg PRN Q8HRS  PRN IV NAUSEA/VOMITING 1ST CHOICE;  

Start 3/16/20 at 05:00;  Stop 3/16/20 at 09:27;  Status DC


Morphine Sulfate (Morphine Sulfate) 2 mg PRN Q2HR  PRN IV SEVERE PAIN 7-10 Last 

administered on 3/17/20at 12:26;  Start 3/16/20 at 05:00;  Stop 3/17/20 at 

14:15;  Status DC


Sodium Chloride 1,000 ml @  125 mls/hr Q8H IV  Last administered on 3/16/20at 

20:56;  Start 3/16/20 at 05:00;  Stop 3/17/20 at 04:59;  Status DC


Hydromorphone HCl (Dilaudid) 0.5 mg PRN Q3HRS  PRN IV SEVERE PAIN 7-10 Last 

administered on 3/17/20at 10:06;  Start 3/16/20 at 05:00;  Stop 3/17/20 at 

12:01;  Status DC


Piperacillin Sod/ Tazobactam Sod 4.5 gm/Sodium Chloride 100 ml @  200 mls/hr 1X 

ONCE IV  Last administered on 3/16/20at 05:44;  Start 3/16/20 at 06:00;  Stop 

3/16/20 at 06:29;  Status DC


Ondansetron HCl (Zofran) 4 mg PRN Q4HRS  PRN IV NAUSEA/VOMITING 1ST CHOICE Last 

administered on 6/27/20at 13:37;  Start 3/16/20 at 09:30


Insulin Human Lispro (HumaLOG) 0-9 UNITS Q6HRS SQ  Last administered on 7/7/20at

05:37;  Start 3/16/20 at 09:30


Dextrose (Dextrose 50%-Water Syringe) 12.5 gm PRN Q15MIN  PRN IV SEE COMMENTS;  

Start 3/16/20 at 09:30


Pantoprazole Sodium (PROTONIX VIAL for IV PUSH) 40 mg DAILYAC IVP  Last 

administered on 7/7/20at 08:32;  Start 3/16/20 at 11:30


Prochlorperazine Edisylate (Compazine) 10 mg PRN Q6HRS  PRN IV NAUSEA/VOMITING, 

2nd CHOICE Last administered on 6/27/20at 10:53;  Start 3/16/20 at 17:45


Atenolol (Tenormin) 100 mg DAILY PO ;  Start 3/17/20 at 09:00;  Stop 3/16/20 at 

20:08;  Status DC


Metoprolol Tartrate (Lopressor Vial) 2.5 mg Q6HRS IVP  Last administered on 

3/17/20at 05:51;  Start 3/16/20 at 20:15;  Stop 3/17/20 at 10:02;  Status DC


Metoprolol Tartrate (Lopressor Vial) 5 mg Q6HRS IVP  Last administered on 

3/26/20at 00:12;  Start 3/17/20 at 10:15;  Stop 3/28/20 at 08:48;  Status DC


Hydromorphone HCl (Dilaudid) 1 mg PRN Q3HRS  PRN IV SEVERE PAIN 7-10 Last 

administered on 3/23/20at 05:13;  Start 3/17/20 at 12:00;  Stop 3/31/20 at 

00:25;  Status DC


Lidocaine HCl (Buffered Lidocaine 1%) 3 ml STK-MED ONCE .ROUTE ;  Start 3/17/20 

at 12:55;  Stop 3/17/20 at 12:56;  Status DC


Albumin Human 500 ml @  125 mls/hr 1X  ONCE IV  Last administered on 3/17/20at 

14:33;  Start 3/17/20 at 14:30;  Stop 3/17/20 at 18:32;  Status DC


Norepinephrine Bitartrate 8 mg/ Dextrose 258 ml @  17.299 mls/ hr CONT  PRN IV 

PER PROTOCOL Last administered on 4/14/20at 12:48;  Start 3/17/20 at 15:30;  

Stop 4/17/20 at 09:19;  Status DC


Sodium Chloride 1,000 ml @  125 mls/hr Q8H IV  Last administered on 3/17/20at 

21:04;  Start 3/17/20 at 16:00;  Stop 3/18/20 at 02:42;  Status DC


Albumin Human 500 ml @  125 mls/hr PRN BID  PRN IV After every 2L NSS & BP < 

90mm Last administered on 6/30/20at 16:06;  Start 3/17/20 at 16:00;  Stop 7/3/20

at 09:30;  Status DC


Iohexol (Omnipaque 300 Mg/ml) 60 ml 1X  ONCE IV  Last administered on 3/17/20at 

17:20;  Start 3/17/20 at 17:00;  Stop 3/17/20 at 17:01;  Status DC


Info (CONTRAST GIVEN -- Rx MONITORING) 1 each PRN DAILY  PRN MC SEE COMMENTS;  

Start 3/17/20 at 17:00;  Stop 3/19/20 at 16:59;  Status DC


Meropenem 1 gm/ Sodium Chloride 100 ml @  200 mls/hr Q8HRS IV  Last administered

on 3/18/20at 05:45;  Start 3/17/20 at 20:00;  Stop 3/18/20 at 08:48;  Status DC


Furosemide (Lasix) 40 mg 1X  ONCE IVP  Last administered on 3/17/20at 22:12;  

Start 3/17/20 at 22:30;  Stop 3/17/20 at 22:31;  Status DC


Calcium Chloride 1000 mg/Sodium Chloride 110 ml @  220 mls/hr 1X  ONCE IV  Last 

administered on 3/17/20at 22:11;  Start 3/17/20 at 22:30;  Stop 3/17/20 at 

22:59;  Status DC


Albuterol Sulfate (Ventolin Neb Soln) 2.5 mg 1X  ONCE NEB  Last administered on 

3/18/20at 00:56;  Start 3/17/20 at 22:30;  Stop 3/17/20 at 22:31;  Status DC


Insulin Human Regular (HumuLIN R VIAL) 5 unit 1X  ONCE IV  Last administered on 

3/17/20at 22:14;  Start 3/17/20 at 22:30;  Stop 3/17/20 at 22:31;  Status DC


Magnesium Sulfate 50 ml @ 25 mls/hr 1X  ONCE IV  Last administered on 3/18/20at 

02:57;  Start 3/18/20 at 03:00;  Stop 3/18/20 at 04:59;  Status DC


Calcium Gluconate 1000 mg/Sodium Chloride 110 ml @  220 mls/hr 1X  ONCE IV  Last

administered on 3/18/20at 02:46;  Start 3/18/20 at 03:00;  Stop 3/18/20 at 

03:29;  Status DC


Sodium Chloride 1,000 ml @  200 mls/hr Q5H IV  Last administered on 3/18/20at 

02:46;  Start 3/18/20 at 03:00;  Stop 3/18/20 at 10:21;  Status DC


Calcium Gluconate 1000 mg/Sodium Chloride 110 ml @  220 mls/hr 1X  ONCE IV  Last

administered on 3/18/20at 03:21;  Start 3/18/20 at 03:30;  Stop 3/18/20 at 

03:59;  Status DC


Sodium Bicarbonate 50 meq/Sodium Chloride 1,050 ml @  75 mls/hr Q14H IV  Last 

administered on 3/22/20at 21:10;  Start 3/18/20 at 07:30;  Stop 3/23/20 at 

10:28;  Status DC


Calcium Gluconate 2000 mg/Sodium Chloride 120 ml @  220 mls/hr 1X  ONCE IV  Last

administered on 3/18/20at 09:05;  Start 3/18/20 at 07:30;  Stop 3/18/20 at 

08:02;  Status DC


Lidocaine HCl (Xylocaine-Mpf 1% 2ml Vial) 2 ml STK-MED ONCE .ROUTE ;  Start 

3/18/20 at 08:47;  Stop 3/18/20 at 08:47;  Status DC


Meropenem 500 mg/ Sodium Chloride 50 ml @  100 mls/hr Q12HR IV  Last 

administered on 3/23/20at 21:01;  Start 3/18/20 at 18:00;  Stop 3/24/20 at 

07:58;  Status DC


Lidocaine HCl (Buffered Lidocaine 1%) 3 ml STK-MED ONCE .ROUTE ;  Start 3/18/20 

at 09:46;  Stop 3/18/20 at 09:46;  Status DC


Lidocaine HCl (Buffered Lidocaine 1%) 6 ml 1X  ONCE INJ  Last administered on 

3/18/20at 10:26;  Start 3/18/20 at 10:15;  Stop 3/18/20 at 10:16;  Status DC


Info (Tpn Per Pharmacy) 1 each PRN DAILY  PRN MC SEE COMMENTS Last administered 

on 7/6/20at 12:17;  Start 3/18/20 at 12:00


Sodium Chloride 1,000 ml @  1,000 mls/hr Q1H PRN IV hypotension;  Start 3/18/20 

at 12:07;  Stop 3/18/20 at 18:06;  Status DC


Diphenhydramine HCl (Benadryl) 25 mg 1X PRN  PRN IV ITCHING;  Start 3/18/20 at 

12:15;  Stop 3/19/20 at 12:14;  Status DC


Diphenhydramine HCl (Benadryl) 25 mg 1X PRN  PRN IV ITCHING;  Start 3/18/20 at 

12:15;  Stop 3/19/20 at 12:14;  Status DC


Sodium Chloride 1,000 ml @  400 mls/hr Q2H30M PRN IV PATENCY;  Start 3/18/20 at 

12:07;  Stop 3/19/20 at 00:06;  Status DC


Info (PHARMACY MONITORING -- do not chart) 1 each PRN DAILY  PRN MC SEE 

COMMENTS;  Start 3/18/20 at 12:15;  Stop 3/20/20 at 08:13;  Status DC


Sodium Chloride 90 meq/Calcium Gluconate 10 meq/ Multivitamins 10 ml/Chromium/ 

Copper/Manganese/ Seleni/Zn 1 ml/ Total Parenteral Nutrition/Amino 

Acids/Dextrose/ Fat Emulsion Intravenous 55.005 ml  @ 2.292 mls/hr TPN  CONT IV 

;  Start 3/18/20 at 22:00;  Stop 3/18/20 at 12:33;  Status DC


Info (Tpn Per Pharmacy) 1 each PRN DAILY  PRN MC SEE COMMENTS;  Start 3/18/20 at

12:30;  Status UNV


Sodium Chloride 90 meq/Calcium Gluconate 10 meq/ Multivitamins 10 ml/Chromium/ 

Copper/Manganese/ Seleni/Zn 0.5 ml/ Total Parenteral Nutrition/Amino 

Acids/Dextrose/ Fat Emulsion Intravenous 1,512 ml @  63 mls/hr TPN  CONT IV  

Last administered on 3/18/20at 22:06;  Start 3/18/20 at 22:00;  Stop 3/19/20 at 

21:59;  Status DC


Calcium Carbonate/ Glycine (Tums) 500 mg PRN AFTMEALHC  PRN PO INDIGESTION;  

Start 3/18/20 at 17:45;  Stop 5/13/20 at 10:25;  Status DC


Calcium Gluconate (Calcium Gluconate) 2,000 mg 1X  ONCE IVP  Last administered 

on 3/19/20at 02:19;  Start 3/19/20 at 02:15;  Stop 3/19/20 at 02:16;  Status DC


Calcium Chloride 3000 mg/Sodium Chloride 1,030 ml @  50 mls/hr [id number] IV  Last 

administered on 3/21/20at 02:17;  Start 3/19/20 at 08:00;  Stop 3/21/20 at 

15:23;  Status DC


Lorazepam (Ativan Inj) 1 mg PRN Q4HRS  PRN IVP ANXIETY / AGITATION, 2nd choic 

Last administered on 4/17/20at 03:51;  Start 3/19/20 at 09:00;  Stop 4/17/20 at 

09:19;  Status DC


Sodium Chloride 1,000 ml @  1,000 mls/hr Q1H PRN IV hypotension;  Start 3/19/20 

at 08:56;  Stop 3/19/20 at 14:55;  Status DC


Albumin Human 200 ml @  200 mls/hr 1X PRN  PRN IV Hypotension;  Start 3/19/20 at

09:00;  Stop 3/19/20 at 14:59;  Status DC


Diphenhydramine HCl (Benadryl) 25 mg 1X PRN  PRN IV ITCHING;  Start 3/19/20 at 

09:00;  Stop 3/20/20 at 08:59;  Status DC


Diphenhydramine HCl (Benadryl) 25 mg 1X PRN  PRN IV ITCHING;  Start 3/19/20 at 

09:00;  Stop 3/20/20 at 08:59;  Status DC


Sodium Chloride 1,000 ml @  400 mls/hr Q2H30M PRN IV PATENCY;  Start 3/19/20 at 

08:56;  Stop 3/19/20 at 20:55;  Status DC


Info (PHARMACY MONITORING -- do not chart) 1 each PRN DAILY  PRN MC SEE 

COMMENTS;  Start 3/19/20 at 09:00;  Status UNV


Info (PHARMACY MONITORING -- do not chart) 1 each PRN DAILY  PRN MC SEE CO

MMENTS;  Start 3/19/20 at 09:00;  Stop 3/20/20 at 08:13;  Status DC


Digoxin (Lanoxin) 500 mcg 1X  ONCE IV  Last administered on 3/19/20at 10:04;  

Start 3/19/20 at 10:00;  Stop 3/19/20 at 10:01;  Status DC


Digoxin (Lanoxin) 125 mcg 1X  ONCE IV  Last administered on 3/19/20at 17:10;  

Start 3/19/20 at 18:00;  Stop 3/19/20 at 18:01;  Status DC


Magnesium Sulfate 100 ml @  25 mls/hr 1X  ONCE IV  Last administered on 

3/19/20at 12:48;  Start 3/19/20 at 13:00;  Stop 3/19/20 at 16:59;  Status DC


Sodium Chloride 90 meq/Magnesium Sulfate 10 meq/ Calcium Gluconate 20 meq/ 

Multivitamins 10 ml/Chromium/ Copper/Manganese/ Seleni/Zn 0.5 ml/ Total Pa

renteral Nutrition/Amino Acids/Dextrose/ Fat Emulsion Intravenous 1,512 ml @  63

mls/hr TPN  CONT IV  Last administered on 3/19/20at 22:25;  Start 3/19/20 at 

22:00;  Stop 3/20/20 at 21:59;  Status DC


Sodium Chloride 1,000 ml @  1,000 mls/hr Q1H PRN IV hypotension;  Start 3/20/20 

at 08:05;  Stop 3/20/20 at 14:04;  Status DC


Albumin Human 200 ml @  200 mls/hr 1X  ONCE IV  Last administered on 3/20/20at 

08:57;  Start 3/20/20 at 08:15;  Stop 3/20/20 at 09:14;  Status DC


Diphenhydramine HCl (Benadryl) 25 mg 1X PRN  PRN IV ITCHING;  Start 3/20/20 at 

08:15;  Stop 3/21/20 at 08:14;  Status DC


Diphenhydramine HCl (Benadryl) 25 mg 1X PRN  PRN IV ITCHING;  Start 3/20/20 at 

08:15;  Stop 3/21/20 at 08:14;  Status DC


Sodium Chloride 1,000 ml @  400 mls/hr Q2H30M PRN IV PATENCY;  Start 3/20/20 at 

08:05;  Stop 3/20/20 at 20:04;  Status DC


Info (PHARMACY MONITORING -- do not chart) 1 each PRN DAILY  PRN MC SEE 

COMMENTS;  Start 3/20/20 at 08:15;  Stop 3/24/20 at 07:57;  Status DC


Sodium Chloride 90 meq/Potassium Chloride 15 meq/ Potassium Phosphate 10 mmol/ 

Magnesium Sulfate 10 meq/Calcium Gluconate 20 meq/ Multivitamins 10 ml/Chromium/

Copper/Manganese/ Seleni/Zn 0.5 ml/ Total Parenteral Nutrition/Amino 

Acids/Dextrose/ Fat Emulsion Intravenous 1,512 ml @  63 mls/hr TPN  CONT IV  

Last administered on 3/20/20at 21:01;  Start 3/20/20 at 22:00;  Stop 3/21/20 at 

21:59;  Status DC


Potassium Chloride/Water 100 ml @  100 mls/hr 1X  ONCE IV  Last administered on 

3/20/20at 14:09;  Start 3/20/20 at 14:00;  Stop 3/20/20 at 14:59;  Status DC


Benzocaine (Hurricaine One) 1 spray 1X  ONCE MM  Last administered on 3/20/20at 

16:38;  Start 3/20/20 at 14:30;  Stop 3/20/20 at 14:31;  Status DC


Lidocaine HCl (Glydo (Lidocaine) Jelly) 1 thomas 1X  ONCE MM  Last administered on 

3/20/20at 16:38;  Start 3/20/20 at 14:30;  Stop 3/20/20 at 14:31;  Status DC


Linezolid/Dextrose 300 ml @  300 mls/hr Q12HR IV  Last administered on 3/26/20at

21:04;  Start 3/20/20 at 20:00;  Stop 3/27/20 at 07:50;  Status DC


Acetaminophen (Tylenol) 650 mg PRN Q6HRS  PRN PO MILD PAIN / TEMP;  Start 

3/21/20 at 03:30;  Stop 3/21/20 at 03:36;  Status DC


Acetaminophen (Tylenol) 650 mg PRN Q6HRS  PRN PEG MILD PAIN / TEMP Last 

administered on 4/16/20at 19:56;  Start 3/21/20 at 03:36;  Stop 5/13/20 at 

10:25;  Status DC


Sodium Chloride 1,000 ml @  1,000 mls/hr Q1H PRN IV hypotension;  Start 3/21/20 

at 07:50;  Stop 3/21/20 at 13:49;  Status DC


Albumin Human 200 ml @  200 mls/hr 1X PRN  PRN IV Hypotension;  Start 3/21/20 at

08:00;  Stop 3/21/20 at 13:59;  Status DC


Sodium Chloride (Normal Saline Flush) 10 ml 1X PRN  PRN IV AP catheter pack;  

Start 3/21/20 at 08:00;  Stop 3/22/20 at 07:59;  Status DC


Sodium Chloride (Normal Saline Flush) 10 ml 1X PRN  PRN IV  catheter pack;  

Start 3/21/20 at 08:00;  Stop 3/22/20 at 07:59;  Status DC


Sodium Chloride 1,000 ml @  400 mls/hr Q2H30M PRN IV PATENCY;  Start 3/21/20 at 

07:50;  Stop 3/21/20 at 19:49;  Status DC


Info (PHARMACY MONITORING -- do not chart) 1 each PRN DAILY  PRN MC SEE 

COMMENTS;  Start 3/21/20 at 08:00;  Status UNV


Info (PHARMACY MONITORING -- do not chart) 1 each PRN DAILY  PRN MC SEE 

COMMENTS;  Start 3/21/20 at 08:00;  Stop 3/23/20 at 08:25;  Status DC


Sodium Chloride 90 meq/Potassium Chloride 15 meq/ Potassium Phosphate 10 mmol/ 

Magnesium Sulfate 10 meq/Calcium Gluconate 20 meq/ Multivitamins 10 ml/Chromium/

Copper/Manganese/ Seleni/Zn 0.5 ml/ Total Parenteral Nutrition/Amino 

Acids/Dextrose/ Fat Emulsion Intravenous 1,512 ml @  63 mls/hr TPN  CONT IV  

Last administered on 3/21/20at 20:57;  Start 3/21/20 at 22:00;  Stop 3/22/20 at 

21:59;  Status DC


Sodium Chloride 90 meq/Potassium Chloride 15 meq/ Potassium Phosphate 15 mmol/ 

Magnesium Sulfate 10 meq/Calcium Gluconate 20 meq/ Multivitamins 10 ml/Chromium/

Copper/Manganese/ Seleni/Zn 0.5 ml/ Total Parenteral Nutrition/Amino 

Acids/Dextrose/ Fat Emulsion Intravenous 1,512 ml @  63 mls/hr TPN  CONT IV ;  

Start 3/22/20 at 22:00;  Stop 3/22/20 at 14:16;  Status DC


Sodium Chloride 90 meq/Potassium Chloride 15 meq/ Potassium Phosphate 15 mmol/ 

Magnesium Sulfate 10 meq/Calcium Gluconate 20 meq/ Multivitamins 10 ml/Chromium/

Copper/Manganese/ Seleni/Zn 0.5 ml/ Total Parenteral Nutrition/Amino 

Acids/Dextrose/ Fat Emulsion Intravenous 1,200 ml @  50 mls/hr TPN  CONT IV ;  

Start 3/22/20 at 22:00;  Stop 3/22/20 at 14:17;  Status DC


Sodium Chloride 90 meq/Potassium Chloride 15 meq/ Potassium Phosphate 10 mmol/ 

Magnesium Sulfate 10 meq/Calcium Gluconate 20 meq/ Multivitamins 10 ml/Chromium/

Copper/Manganese/ Seleni/Zn 0.5 ml/ Total Parenteral Nutrition/Amino 

Acids/Dextrose/ Fat Emulsion Intravenous 1,200 ml @  50 mls/hr TPN  CONT IV  

Last administered on 3/22/20at 23:29;  Start 3/22/20 at 22:00;  Stop 3/23/20 at 

21:59;  Status DC


Sodium Chloride 1,000 ml @  1,000 mls/hr Q1H PRN IV hypotension;  Start 3/23/20 

at 07:28;  Stop 3/23/20 at 13:27;  Status DC


Albumin Human 200 ml @  200 mls/hr 1X  ONCE IV  Last administered on 3/23/20at 

08:51;  Start 3/23/20 at 07:30;  Stop 3/23/20 at 08:29;  Status DC


Diphenhydramine HCl (Benadryl) 25 mg 1X PRN  PRN IV ITCHING;  Start 3/23/20 at 

07:30;  Stop 3/24/20 at 07:29;  Status DC


Diphenhydramine HCl (Benadryl) 25 mg 1X PRN  PRN IV ITCHING;  Start 3/23/20 at 

07:30;  Stop 3/24/20 at 07:29;  Status DC


Sodium Chloride 1,000 ml @  400 mls/hr Q2H30M PRN IV PATENCY;  Start 3/23/20 at 

07:28;  Stop 3/23/20 at 19:27;  Status DC


Info (PHARMACY MONITORING -- do not chart) 1 each PRN DAILY  PRN MC SEE 

COMMENTS;  Start 3/23/20 at 07:30;  Stop 4/3/20 at 13:01;  Status DC


Metronidazole 100 ml @  100 mls/hr Q6HRS IV  Last administered on 4/8/20at 

06:26;  Start 3/23/20 at 08:30;  Stop 4/8/20 at 09:58;  Status DC


Micafungin Sodium 100 mg/Dextrose 100 ml @  100 mls/hr Q24H IV  Last 

administered on 4/30/20at 08:18;  Start 3/23/20 at 09:00;  Stop 4/30/20 at 

20:58;  Status DC


Propofol 0 ml @ As Directed STK-MED ONCE IV ;  Start 3/23/20 at 07:53;  Stop 3/2

3/20 at 07:53;  Status DC


Etomidate (Amidate) 20 mg STK-MED ONCE IV ;  Start 3/23/20 at 07:53;  Stop 

3/23/20 at 07:54;  Status DC


Midazolam HCl (Versed) 5 mg STK-MED ONCE .ROUTE ;  Start 3/23/20 at 07:57;  Stop

3/23/20 at 07:57;  Status DC


Fentanyl Citrate 30 ml @ 0 mls/hr CONT  PRN IV SEE PROTOCOL Last administered on

4/17/20at 06:12;  Start 3/23/20 at 08:15;  Stop 4/17/20 at 09:19;  Status DC


Artificial Tears (Artificial Tears) 1 drop PRN Q1HR  PRN OU DRY EYE, 1st choice;

 Start 3/23/20 at 08:15;  Stop 4/29/20 at 05:31;  Status DC


Midazolam HCl 50 mg/Sodium Chloride 50 ml @ 0 mls/hr CONT  PRN IV SEE PROTOCOL 

Last administered on 3/26/20at 22:39;  Start 3/23/20 at 08:15;  Stop 3/28/20 at 

15:59;  Status DC


Etomidate (Amidate) 8 mg 1X  ONCE IV  Last administered on 3/23/20at 08:33;  

Start 3/23/20 at 08:30;  Stop 3/23/20 at 08:31;  Status DC


Succinylcholine Chloride (Anectine) 120 mg 1X  ONCE IV  Last administered on 

3/23/20at 08:34;  Start 3/23/20 at 08:30;  Stop 3/23/20 at 08:31;  Status DC


Midazolam HCl (Versed) 5 mg 1X  ONCE IV ;  Start 3/23/20 at 08:30;  Stop 3/23/20

at 08:31;  Status DC


Potassium Chloride 15 meq/ Bicarbonate Dialysis Soln w/ out KCl 5,007.5 ml  @ 

1,000 mls/ hr Q5H1M IV  Last administered on 3/24/20at 11:11;  Start 3/23/20 at 

12:00;  Stop 3/24/20 at 11:15;  Status DC


Potassium Chloride 15 meq/ Bicarbonate Dialysis Soln w/ out KCl 5,007.5 ml  @ 

1,000 mls/ hr Q5H1M IV  Last administered on 3/24/20at 11:12;  Start 3/23/20 at 

12:00;  Stop 3/24/20 at 11:17;  Status DC


Potassium Chloride 15 meq/ Bicarbonate Dialysis Soln w/ out KCl 5,007.5 ml  @ 

1,000 mls/ hr Q5H1M IV  Last administered on 3/24/20at 11:11;  Start 3/23/20 at 

12:00;  Stop 3/24/20 at 11:19;  Status DC


Sodium Chloride 90 meq/Potassium Chloride 15 meq/ Potassium Phosphate 10 mmol/ 

Magnesium Sulfate 10 meq/Calcium Gluconate 20 meq/ Multivitamins 10 ml/Chromium/

Copper/Manganese/ Seleni/Zn 0.5 ml/ Total Parenteral Nutrition/Amino 

Acids/Dextrose/ Fat Emulsion Intravenous 1,400 ml @  58.333 mls/ hr TPN  CONT IV

 Last administered on 3/23/20at 21:42;  Start 3/23/20 at 22:00;  Stop 3/24/20 at

21:59;  Status DC


Heparin Sodium (Porcine) (Heparin Sodium) 5,000 unit Q8HRS SQ  Last administered

on 3/28/20at 05:55;  Start 3/23/20 at 15:00;  Stop 3/28/20 at 13:28;  Status DC


Meropenem 500 mg/ Sodium Chloride 50 ml @  100 mls/hr Q6HRS IV  Last 

administered on 3/25/20at 06:00;  Start 3/24/20 at 09:00;  Stop 3/25/20 at 

07:29;  Status DC


Potassium Phosphate 20 mmol/ Sodium Chloride 106.6667 ml @  51.667 m... 1X  ONCE

IV  Last administered on 3/24/20at 11:22;  Start 3/24/20 at 10:15;  Stop 3/24/20

at 12:18;  Status DC


Acetaminophen (Tylenol Supp) 650 mg PRN Q6HRS  PRN IN MILD PAIN / TEMP > 100.3'F

Last administered on 6/29/20at 18:16;  Start 3/24/20 at 10:30


Potassium Chloride/Water 100 ml @  100 mls/hr Q1H IV  Last administered on 

3/24/20at 12:12;  Start 3/24/20 at 11:00;  Stop 3/24/20 at 12:59;  Status DC


Potassium Chloride 20 meq/ Bicarbonate Dialysis Soln w/ out KCl 5,010 ml @  

1,000 mls/hr Q5H1M IV  Last administered on 3/25/20at 08:48;  Start 3/24/20 at 

12:00;  Stop 3/25/20 at 13:03;  Status DC


Potassium Chloride 20 meq/ Bicarbonate Dialysis Soln w/ out KCl 5,010 ml @  

1,000 mls/hr Q5H1M IV  Last administered on 3/29/20at 14:52;  Start 3/24/20 at 

11:30;  Stop 3/29/20 at 19:59;  Status DC


Potassium Chloride 20 meq/ Bicarbonate Dialysis Soln w/ out KCl 5,010 ml @  

1,000 mls/hr Q5H1M IV  Last administered on 3/29/20at 14:53;  Start 3/24/20 at 

11:30;  Stop 3/29/20 at 19:59;  Status DC


Sodium Chloride 90 meq/Potassium Chloride 15 meq/ Potassium Phosphate 15 mmol/ 

Magnesium Sulfate 10 meq/Calcium Gluconate 15 meq/ Multivitamins 10 ml/Chromium/

Copper/Manganese/ Seleni/Zn 0.5 ml/ Total Parenteral Nutrition/Amino 

Acids/Dextrose/ Fat Emulsion Intravenous 1,400 ml @  58.333 mls/ hr TPN  CONT IV

 Last administered on 3/24/20at 22:17;  Start 3/24/20 at 22:00;  Stop 3/25/20 at

21:59;  Status DC


Cefepime HCl (Maxipime) 2 gm Q12HR IVP  Last administered on 4/7/20at 20:56;  

Start 3/25/20 at 09:00;  Stop 4/8/20 at 09:58;  Status DC


Daptomycin 500 mg/ Sodium Chloride 50 ml @  100 mls/hr Q48H IV  Last 

administered on 4/10/20at 09:57;  Start 3/25/20 at 08:30;  Stop 4/10/20 at 

10:07;  Status DC


Lidocaine HCl (Buffered Lidocaine 1%) 3 ml 1X  ONCE INJ  Last administered on 

3/25/20at 10:27;  Start 3/25/20 at 10:30;  Stop 3/25/20 at 10:31;  Status DC


Potassium Phosphate 20 mmol/ Sodium Chloride 106.6667 ml @  51.667 m... 1X  ONCE

IV  Last administered on 3/25/20at 12:51;  Start 3/25/20 at 13:00;  Stop 3/25/20

at 15:03;  Status DC


Sodium Chloride 90 meq/Potassium Chloride 15 meq/ Potassium Phosphate 18 mmol/ 

Magnesium Sulfate 8 meq/Calcium Gluconate 15 meq/ Multivitamins 10 ml/Chromium/ 

Copper/Manganese/ Seleni/Zn 0.5 ml/ Total Parenteral Nutrition/Amino 

Acids/Dextrose/ Fat Emulsion Intravenous 1,400 ml @  58.333 mls/ hr TPN  CONT IV

 Last administered on 3/25/20at 22:16;  Start 3/25/20 at 22:00;  Stop 3/26/20 at

21:59;  Status DC


Potassium Chloride 20 meq/ Bicarbonate Dialysis Soln w/ out KCl 5,010 ml @  

1,000 mls/hr Q5H1M IV  Last administered on 3/29/20at 14:54;  Start 3/25/20 at 

16:00;  Stop 3/29/20 at 19:59;  Status DC


Multi-Ingred Cream/Lotion/Oil/ Oint (Artificial Tears Eye Ointment) 1 thomas PRN 

Q1HR  PRN OU DRY EYE, 2nd choice Last administered on 4/13/20at 08:19;  Start 

3/25/20 at 17:30;  Stop 6/3/20 at 14:39;  Status DC


Sodium Chloride 90 meq/Potassium Chloride 15 meq/ Potassium Phosphate 18 mmol/ 

Magnesium Sulfate 8 meq/Calcium Gluconate 15 meq/ Multivitamins 10 ml/Chromium/ 

Copper/Manganese/ Seleni/Zn 0.5 ml/ Total Parenteral Nutrition/Amino 

Acids/Dextrose/ Fat Emulsion Intravenous 1,400 ml @  58.333 mls/ hr TPN  CONT IV

 Last administered on 3/26/20at 22:00;  Start 3/26/20 at 22:00;  Stop 3/27/20 at

21:59;  Status DC


Albumin Human 500 ml @  125 mls/hr 1X  ONCE IV ;  Start 3/26/20 at 14:15;  Stop 

3/26/20 at 18:14;  Status DC


Sodium Chloride 90 meq/Potassium Chloride 15 meq/ Potassium Phosphate 18 mmol/ 

Magnesium Sulfate 8 meq/Calcium Gluconate 15 meq/ Multivitamins 10 ml/Chromium/ 

Copper/Manganese/ Seleni/Zn 0.5 ml/ Insulin Human Regular 10 unit/ Total 

Parenteral Nutrition/Amino Acids/Dextrose/ Fat Emulsion Intravenous 1,400 ml @  

58.333 mls/ hr TPN  CONT IV  Last administered on 3/27/20at 21:43;  Start 3/

27/20 at 22:00;  Stop 3/28/20 at 21:59;  Status DC


Lidocaine HCl (Buffered Lidocaine 1%) 3 ml STK-MED ONCE .ROUTE ;  Start 3/25/20 

at 10:00;  Stop 3/27/20 at 13:57;  Status DC


Midazolam HCl 100 mg/Sodium Chloride 100 ml @ 7 mls/hr CONT  PRN IV SEE PROTOCOL

Last administered on 4/8/20at 15:35;  Start 3/28/20 at 16:00;  Stop 6/3/20 at 

14:38;  Status DC


Sodium Chloride 90 meq/Potassium Chloride 15 meq/ Potassium Phosphate 18 mmol/ 

Magnesium Sulfate 8 meq/Calcium Gluconate 15 meq/ Multivitamins 10 ml/Chromium/ 

Copper/Manganese/ Seleni/Zn 0.5 ml/ Insulin Human Regular 15 unit/ Total 

Parenteral Nutrition/Amino Acids/Dextrose/ Fat Emulsion Intravenous 1,400 ml @  

58.333 mls/ hr TPN  CONT IV  Last administered on 3/28/20at 20:34;  Start 

3/28/20 at 22:00;  Stop 3/29/20 at 21:59;  Status DC


Info ([facility] Electrolyte Protocol) 1 ea CONT PRN  PRN MC PER PROTOCOL;  Start 

3/29/20 at 13:15


Sodium Chloride 90 meq/Potassium Chloride 15 meq/ Potassium Phosphate 18 mmol/ 

Magnesium Sulfate 8 meq/Calcium Gluconate 15 meq/ Multivitamins 10 ml/Chromium/ 

Copper/Manganese/ Seleni/Zn 0.5 ml/ Insulin Human Regular 15 unit/ Total 

Parenteral Nutrition/Amino Acids/Dextrose/ Fat Emulsion Intravenous 1,400 ml @  

58.333 mls/ hr TPN  CONT IV  Last administered on 3/29/20at 22:05;  Start 

3/29/20 at 22:00;  Stop 3/30/20 at 21:59;  Status DC


Potassium Chloride 15 meq/ Bicarbonate Dialysis Soln w/ out KCl 5,007.5 ml  @ 

1,000 mls/ hr Q5H1M IV  Last administered on 4/1/20at 18:14;  Start 3/29/20 at 

20:00;  Stop 4/2/20 at 13:08;  Status DC


Potassium Chloride 15 meq/ Bicarbonate Dialysis Soln w/ out KCl 5,007.5 ml  @ 

1,000 mls/ hr Q5H1M IV  Last administered on 4/1/20at 18:14;  Start 3/29/20 at 

20:00;  Stop 4/2/20 at 13:08;  Status DC


Potassium Chloride 15 meq/ Bicarbonate Dialysis Soln w/ out KCl 5,007.5 ml  @ 

1,000 mls/ hr Q5H1M IV  Last administered on 4/1/20at 18:14;  Start 3/29/20 at 

20:00;  Stop 4/2/20 at 13:08;  Status DC


Iohexol (Omnipaque 240 Mg/ml) 30 ml 1X  ONCE PO  Last administered on 3/30/20at 

11:30;  Start 3/30/20 at 11:30;  Stop 3/30/20 at 11:33;  Status DC


Info (CONTRAST GIVEN -- Rx MONITORING) 1 each PRN DAILY  PRN MC SEE COMMENTS;  

Start 3/30/20 at 11:45;  Stop 4/1/20 at 11:44;  Status DC


Sodium Chloride 90 meq/Potassium Chloride 15 meq/ Potassium Phosphate 18 mmol/ 

Magnesium Sulfate 8 meq/Calcium Gluconate 15 meq/ Multivitamins 10 ml/Chromium/ 

Copper/Manganese/ Seleni/Zn 0.5 ml/ Insulin Human Regular 15 unit/ Total 

Parenteral Nutrition/Amino Acids/Dextrose/ Fat Emulsion Intravenous 1,400 ml @  

58.333 mls/ hr TPN  CONT IV  Last administered on 3/30/20at 21:47;  Start 

3/30/20 at 22:00;  Stop 3/31/20 at 21:59;  Status DC


Sodium Chloride 90 meq/Potassium Chloride 15 meq/ Potassium Phosphate 18 mmol/ 

Magnesium Sulfate 8 meq/Calcium Gluconate 15 meq/ Multivitamins 10 ml/Chromium/ 

Copper/Manganese/ Seleni/Zn 0.5 ml/ Insulin Human Regular 20 unit/ Total 

Parenteral Nutrition/Amino Acids/Dextrose/ Fat Emulsion Intravenous 1,400 ml @  

58.333 mls/ hr TPN  CONT IV  Last administered on 3/31/20at 21:36;  Start 

3/31/20 at 22:00;  Stop 4/1/20 at 21:59;  Status DC


Alteplase, Recombinant (Cathflo For Central Catheter Clearance) 1 mg 1X  ONCE 

INT CAT  Last administered on 3/31/20at 20:03;  Start 3/31/20 at 19:30;  Stop 

3/31/20 at 19:46;  Status DC


Alteplase, Recombinant (Cathflo For Central Catheter Clearance) 1 mg 1X  ONCE 

INT CAT  Last administered on 3/31/20at 22:05;  Start 3/31/20 at 22:00;  Stop 

3/31/20 at 22:01;  Status DC


Sodium Chloride 90 meq/Potassium Chloride 15 meq/ Potassium Phosphate 18 mmol/ 

Magnesium Sulfate 8 meq/Calcium Gluconate 15 meq/ Multivitamins 10 ml/Chromium/ 

Copper/Manganese/ Seleni/Zn 0.5 ml/ Insulin Human Regular 20 unit/ Total 

Parenteral Nutrition/Amino Acids/Dextrose/ Fat Emulsion Intravenous 1,400 ml @  

58.333 mls/ hr TPN  CONT IV  Last administered on 4/1/20at 21:30;  Start 4/1/20 

at 22:00;  Stop 4/2/20 at 21:59;  Status DC


Dexmedetomidine HCl 400 mcg/ Sodium Chloride 100 ml @ 0 mls/hr CONT  PRN IV 

ANXIETY / AGITATION Last administered on 5/30/20at 12:57;  Start 4/2/20 at 

08:15;  Stop 5/30/20 at 18:31;  Status DC


Sodium Chloride 500 ml @  500 mls/hr 1X PRN  PRN IV ELEVATED BP, SEE COMMENTS;  

Start 4/2/20 at 08:15


Atropine Sulfate (ATROPINE 0.5mg SYRINGE) 0.5 mg PRN Q5MIN  PRN IV SEE COMMENTS;

 Start 4/2/20 at 08:15


Furosemide (Lasix) 20 mg 1X  ONCE IVP  Last administered on 4/2/20at 08:19;  

Start 4/2/20 at 08:15;  Stop 4/2/20 at 08:16;  Status DC


Lidocaine HCl (Buffered Lidocaine 1%) 3 ml STK-MED ONCE .ROUTE ;  Start 4/2/20 

at 08:39;  Stop 4/2/20 at 08:39;  Status DC


Lidocaine HCl (Buffered Lidocaine 1%) 6 ml 1X  ONCE INJ  Last administered on 

4/2/20at 09:05;  Start 4/2/20 at 09:00;  Stop 4/2/20 at 09:06;  Status DC


Sodium Chloride 90 meq/Potassium Chloride 15 meq/ Potassium Phosphate 18 mmol/ 

Magnesium Sulfate 8 meq/Calcium Gluconate 15 meq/ Multivitamins 10 ml/Chromium/ 

Copper/Manganese/ Seleni/Zn 0.5 ml/ Insulin Human Regular 20 unit/ Total 

Parenteral Nutrition/Amino Acids/Dextrose/ Fat Emulsion Intravenous 1,400 ml @  

58.333 mls/ hr TPN  CONT IV  Last administered on 4/2/20at 22:45;  Start 4/2/20 

at 22:00;  Stop 4/3/20 at 21:59;  Status DC


Sodium Chloride 1,000 ml @  1,000 mls/hr Q1H PRN IV hypotension;  Start 4/3/20 

at 07:30;  Stop 4/3/20 at 13:29;  Status DC


Albumin Human 200 ml @  200 mls/hr 1X PRN  PRN IV Hypotension Last administered 

on 4/3/20at 09:36;  Start 4/3/20 at 07:30;  Stop 4/3/20 at 13:29;  Status DC


Sodium Chloride (Normal Saline Flush) 10 ml 1X PRN  PRN IV AP catheter pack;  

Start 4/3/20 at 07:30;  Stop 4/3/20 at 21:29;  Status DC


Sodium Chloride (Normal Saline Flush) 10 ml 1X PRN  PRN IV  catheter pack;  

Start 4/3/20 at 07:30;  Stop 4/4/20 at 07:29;  Status DC


Sodium Chloride 1,000 ml @  400 mls/hr Q2H30M PRN IV PATENCY;  Start 4/3/20 at 

07:30;  Stop 4/3/20 at 19:29;  Status DC


Info (PHARMACY MONITORING -- do not chart) 1 each PRN DAILY  PRN MC SEE 

COMMENTS;  Start 4/3/20 at 07:30;  Stop 4/3/20 at 13:02;  Status DC


Info (PHARMACY MONITORING -- do not chart) 1 each PRN DAILY  PRN MC SEE MIKEY

TS;  Start 4/3/20 at 07:30;  Stop 4/5/20 at 12:45;  Status DC


Sodium Chloride 90 meq/Potassium Chloride 15 meq/ Potassium Phosphate 10 mmol/ 

Magnesium Sulfate 8 meq/Calcium Gluconate 15 meq/ Multivitamins 10 ml/Chromium/ 

Copper/Manganese/ Seleni/Zn 0.5 ml/ Insulin Human Regular 25 unit/ Total 

Parenteral Nutrition/Amino Acids/Dextrose/ Fat Emulsion Intravenous 1,400 ml @  

58.333 mls/ hr TPN  CONT IV  Last administered on 4/3/20at 22:19;  Start 4/3/20 

at 22:00;  Stop 4/4/20 at 21:59;  Status DC


Heparin Sodium (Porcine) (Heparin Sodium) 5,000 unit Q12HR SQ  Last administered

on 4/26/20at 08:59;  Start 4/3/20 at 21:00;  Stop 4/26/20 at 10:05;  Status DC


Ondansetron HCl (Zofran) 4 mg PRN Q6HRS  PRN IV NAUSEA/VOMITING;  Start 4/6/20 

at 07:00;  Stop 4/7/20 at 06:59;  Status DC


Fentanyl Citrate (Fentanyl 2ml Vial) 25 mcg PRN Q5MIN  PRN IV MILD PAIN 1-3;  

Start 4/6/20 at 07:00;  Stop 4/7/20 at 06:59;  Status DC


Fentanyl Citrate (Fentanyl 2ml Vial) 50 mcg PRN Q5MIN  PRN IV MODERATE TO SEVERE

PAIN;  Start 4/6/20 at 07:00;  Stop 4/7/20 at 06:59;  Status DC


Ringer's Solution 1,000 ml @  30 mls/hr Q24H IV ;  Start 4/6/20 at 07:00;  Stop 

4/6/20 at 18:59;  Status DC


Lidocaine HCl (Xylocaine-Mpf 1% 2ml Vial) 2 ml PRN 1X  PRN ID PRIOR TO IV START;

 Start 4/6/20 at 07:00;  Stop 4/7/20 at 06:59;  Status DC


Prochlorperazine Edisylate (Compazine) 5 mg PACU PRN  PRN IV NAUSEA, MRX1;  

Start 4/6/20 at 07:00;  Stop 4/7/20 at 06:59;  Status DC


Sodium Chloride 1,000 ml @  1,000 mls/hr Q1H PRN IV hypotension;  Start 4/4/20 

at 09:10;  Stop 4/4/20 at 15:09;  Status DC


Albumin Human 200 ml @  200 mls/hr 1X PRN  PRN IV Hypotension Last administered 

on 4/4/20at 10:10;  Start 4/4/20 at 09:15;  Stop 4/4/20 at 15:14;  Status DC


Sodium Chloride 1,000 ml @  400 mls/hr Q2H30M PRN IV PATENCY;  Start 4/4/20 at 

09:10;  Stop 4/4/20 at 21:09;  Status DC


Info (PHARMACY MONITORING -- do not chart) 1 each PRN DAILY  PRN MC SEE COMMENT

S;  Start 4/4/20 at 09:15;  Stop 4/5/20 at 12:45;  Status DC


Info (PHARMACY MONITORING -- do not chart) 1 each PRN DAILY  PRN MC SEE 

COMMENTS;  Start 4/4/20 at 09:15;  Stop 4/5/20 at 12:45;  Status DC


Sodium Chloride 90 meq/Potassium Chloride 15 meq/ Potassium Phosphate 10 mmol/ 

Magnesium Sulfate 8 meq/Calcium Gluconate 15 meq/ Multivitamins 10 ml/Chromium/ 

Copper/Manganese/ Seleni/Zn 0.5 ml/ Insulin Human Regular 25 unit/ Total 

Parenteral Nutrition/Amino Acids/Dextrose/ Fat Emulsion Intravenous 1,400 ml @  

58.333 mls/ hr TPN  CONT IV  Last administered on 4/4/20at 22:10;  Start 4/4/20 

at 22:00;  Stop 4/5/20 at 21:59;  Status DC


Magnesium Sulfate 50 ml @ 25 mls/hr PRN DAILY  PRN IV for Mag < 1.7 on am labs 

Last administered on 6/18/20at 10:57;  Start 4/5/20 at 09:15


Sodium Chloride 90 meq/Potassium Chloride 15 meq/ Potassium Phosphate 10 mmol/ 

Magnesium Sulfate 8 meq/Calcium Gluconate 15 meq/ Multivitamins 10 ml/Chromium/ 

Copper/Manganese/ Seleni/Zn 0.5 ml/ Insulin Human Regular 25 unit/ Total 

Parenteral Nutrition/Amino Acids/Dextrose/ Fat Emulsion Intravenous 1,400 ml @  

58.333 mls/ hr TPN  CONT IV  Last administered on 4/5/20at 21:20;  Start 4/5/20 

at 22:00;  Stop 4/6/20 at 21:59;  Status DC


Sodium Chloride 1,000 ml @  1,000 mls/hr Q1H PRN IV hypotension;  Start 4/5/20 

at 12:23;  Stop 4/5/20 at 18:22;  Status DC


Albumin Human 200 ml @  200 mls/hr 1X  ONCE IV  Last administered on 4/5/20at 

13:34;  Start 4/5/20 at 12:30;  Stop 4/5/20 at 13:29;  Status DC


Diphenhydramine HCl (Benadryl) 25 mg 1X PRN  PRN IV ITCHING;  Start 4/5/20 at 

12:30;  Stop 4/6/20 at 12:29;  Status DC


Diphenhydramine HCl (Benadryl) 25 mg 1X PRN  PRN IV ITCHING;  Start 4/5/20 at 

12:30;  Stop 4/6/20 at 12:29;  Status DC


Info (PHARMACY MONITORING -- do not chart) 1 each PRN DAILY  PRN MC SEE COM

MENTS;  Start 4/5/20 at 12:30;  Status Cancel


Bupivacaine HCl/ Epinephrine Bitart (Sensorcain-Epi 0.5%-1:[id number] Mpf) 30 ml 

STK-MED ONCE .ROUTE  Last administered on 4/6/20at 11:44;  Start 4/6/20 at 

11:00;  Stop 4/6/20 at 11:01;  Status DC


Cellulose (Surgicel Fibrillar 1x2) 1 each STK-MED ONCE .ROUTE ;  Start 4/6/20 at

11:00;  Stop 4/6/20 at 11:01;  Status DC


Sodium Chloride 90 meq/Potassium Chloride 15 meq/ Potassium Phosphate 10 mmol/ 

Magnesium Sulfate 12 meq/Calcium Gluconate 15 meq/ Multivitamins 10 ml/Chromium/

Copper/Manganese/ Seleni/Zn 0.5 ml/ Insulin Human Regular 25 unit/ Total 

Parenteral Nutrition/Amino Acids/Dextrose/ Fat Emulsion Intravenous 1,400 ml @  

58.333 mls/ hr TPN  CONT IV  Last administered on 4/6/20at 22:24;  Start 4/6/20 

at 22:00;  Stop 4/7/20 at 21:59;  Status DC


Propofol 20 ml @ As Directed STK-MED ONCE IV ;  Start 4/6/20 at 11:07;  Stop 

4/6/20 at 11:07;  Status DC


Cellulose (Surgicel Hemostat 4x8) 1 each STK-MED ONCE .ROUTE  Last administered 

on 4/6/20at 11:44;  Start 4/6/20 at 11:55;  Stop 4/6/20 at 11:56;  Status DC


Sevoflurane (Ultane) 60 ml STK-MED ONCE IH ;  Start 4/6/20 at 12:46;  Stop 

4/6/20 at 12:46;  Status DC


Sodium Chloride 1,000 ml @  1,000 mls/hr Q1H PRN IV hypotension;  Start 4/6/20 

at 13:51;  Stop 4/6/20 at 19:50;  Status DC


Albumin Human 200 ml @  200 mls/hr 1X PRN  PRN IV Hypotension Last administered 

on 4/6/20at 14:51;  Start 4/6/20 at 14:00;  Stop 4/6/20 at 19:59;  Status DC


Diphenhydramine HCl (Benadryl) 25 mg 1X PRN  PRN IV ITCHING;  Start 4/6/20 at 

14:00;  Stop 4/7/20 at 13:59;  Status DC


Diphenhydramine HCl (Benadryl) 25 mg 1X PRN  PRN IV ITCHING;  Start 4/6/20 at 

14:00;  Stop 4/7/20 at 13:59;  Status DC


Sodium Chloride 1,000 ml @  400 mls/hr Q2H30M PRN IV PATENCY;  Start 4/6/20 at 

13:51;  Stop 4/7/20 at 01:50;  Status DC


Info (PHARMACY MONITORING -- do not chart) 1 each PRN DAILY  PRN MC SEE 

COMMENTS;  Start 4/6/20 at 14:00;  Stop 4/9/20 at 08:16;  Status DC


Heparin Sodium (Porcine) (Hep Lock Adult) 500 unit STK-MED ONCE IVP ;  Start 

4/7/20 at 09:29;  Stop 4/7/20 at 09:30;  Status DC


Sodium Chloride 1,000 ml @  1,000 mls/hr Q1H PRN IV hypotension;  Start 4/7/20 

at 10:43;  Stop 4/7/20 at 16:42;  Status DC


Sodium Chloride 1,000 ml @  400 mls/hr Q2H30M PRN IV PATENCY;  Start 4/7/20 at 

10:43;  Stop 4/7/20 at 22:42;  Status DC


Info (PHARMACY MONITORING -- do not chart) 1 each PRN DAILY  PRN MC SEE 

COMMENTS;  Start 4/7/20 at 10:45;  Status UNV


Info (PHARMACY MONITORING -- do not chart) 1 each PRN DAILY  PRN MC SEE 

COMMENTS;  Start 4/7/20 at 10:45;  Status UNV


Sodium Chloride 90 meq/Potassium Chloride 15 meq/ Magnesium Sulfate 12 

meq/Calcium Gluconate 15 meq/ Multivitamins 10 ml/Chromium/ Copper/Manganese/ 

Seleni/Zn 0.5 ml/ Insulin Human Regular 25 unit/ Total Parenteral 

Nutrition/Amino Acids/Dextrose/ Fat Emulsion Intravenous 1,400 ml @  58.333 mls/

hr TPN  CONT IV  Last administered on 4/7/20at 22:13;  Start 4/7/20 at 22:00;  

Stop 4/8/20 at 21:59;  Status DC


Sodium Chloride 1,000 ml @  1,000 mls/hr Q1H PRN IV hypotension;  Start 4/8/20 

at 07:50;  Stop 4/8/20 at 13:49;  Status DC


Albumin Human 200 ml @  200 mls/hr 1X  ONCE IV ;  Start 4/8/20 at 08:00;  Stop 

4/8/20 at 08:53;  Status DC


Diphenhydramine HCl (Benadryl) 25 mg 1X PRN  PRN IV ITCHING;  Start 4/8/20 at 

08:00;  Stop 4/9/20 at 07:59;  Status DC


Diphenhydramine HCl (Benadryl) 25 mg 1X PRN  PRN IV ITCHING;  Start 4/8/20 at 

08:00;  Stop 4/9/20 at 07:59;  Status DC


Info (PHARMACY MONITORING -- do not chart) 1 each PRN DAILY  PRN MC SEE 

COMMENTS;  Start 4/8/20 at 08:00;  Stop 4/9/20 at 08:16;  Status DC


Albumin Human 50 ml @ 50 mls/hr 1X  ONCE IV ;  Start 4/8/20 at 08:53;  Stop 

4/8/20 at 08:56;  Status DC


Albumin Human 200 ml @  50 mls/hr PRN 1X  PRN IV HYPOTENSION Last administered 

on 4/14/20at 11:54;  Start 4/8/20 at 09:00;  Stop 5/21/20 at 11:14;  Status DC


Meropenem 500 mg/ Sodium Chloride 50 ml @  100 mls/hr Q12H IV  Last administered

on 4/28/20at 10:45;  Start 4/8/20 at 10:00;  Stop 4/28/20 at 12:37;  Status DC


Sodium Chloride 90 meq/Magnesium Sulfate 12 meq/ Calcium Gluconate 15 meq/ 

Multivitamins 10 ml/Chromium/ Copper/Manganese/ Seleni/Zn 0.5 ml/ Insulin Human 

Regular 25 unit/ Total Parenteral Nutrition/Amino Acids/Dextrose/ Fat Emulsion 

Intravenous 1,400 ml @  58.333 mls/ hr TPN  CONT IV  Last administered on 

4/8/20at 21:41;  Start 4/8/20 at 22:00;  Stop 4/9/20 at 21:59;  Status DC


Sodium Chloride 1,000 ml @  1,000 mls/hr Q1H PRN IV hypotension;  Start 4/9/20 

at 07:58;  Stop 4/9/20 at 13:57;  Status DC


Albumin Human 200 ml @  200 mls/hr 1X PRN  PRN IV Hypotension Last administered 

on 4/9/20at 09:30;  Start 4/9/20 at 08:00;  Stop 4/9/20 at 13:59;  Status DC


Sodium Chloride 1,000 ml @  400 mls/hr Q2H30M PRN IV PATENCY;  Start 4/9/20 at 

07:58;  Stop 4/9/20 at 19:57;  Status DC


Info (PHARMACY MONITORING -- do not chart) 1 each PRN DAILY  PRN MC SEE 

COMMENTS;  Start 4/9/20 at 08:00;  Status Cancel


Info (PHARMACY MONITORING -- do not chart) 1 each PRN DAILY  PRN MC SEE 

COMMENTS;  Start 4/9/20 at 08:15;  Status UNV


Sodium Chloride 90 meq/Potassium Phosphate 5 mmol/ Magnesium Sulfate 12 

meq/Calcium Gluconate 15 meq/ Multivitamins 10 ml/Chromium/ Copper/Manganese/ 

Seleni/Zn 0.5 ml/ Insulin Human Regular 30 unit/ Total Parenteral 

Nutrition/Amino Acids/Dextrose/ Fat Emulsion Intravenous 1,400 ml @  58.333 mls/

hr TPN  CONT IV  Last administered on 4/9/20at 22:08;  Start 4/9/20 at 22:00;  

Stop 4/10/20 at 21:59;  Status DC


Linezolid/Dextrose 300 ml @  300 mls/hr Q12HR IV  Last administered on 4/20/20at

20:40;  Start 4/10/20 at 11:00;  Stop 4/21/20 at 08:10;  Status DC


Sodium Chloride 90 meq/Potassium Phosphate 15 mmol/ Magnesium Sulfate 12 

meq/Calcium Gluconate 15 meq/ Multivitamins 10 ml/Chromium/ Copper/Manganese/ 

Seleni/Zn 0.5 ml/ Insulin Human Regular 30 unit/ Total Parenteral 

Nutrition/Amino Acids/Dextrose/ Fat Emulsion Intravenous 1,400 ml @  58.333 mls/

hr TPN  CONT IV  Last administered on 4/10/20at 21:49;  Start 4/10/20 at 22:00; 

Stop 4/11/20 at 21:59;  Status DC


Sodium Chloride 90 meq/Potassium Phosphate 15 mmol/ Magnesium Sulfate 12 

meq/Calcium Gluconate 15 meq/ Multivitamins 10 ml/Chromium/ Copper/Manganese/ 

Seleni/Zn 0.5 ml/ Insulin Human Regular 40 unit/ Total Parenteral 

Nutrition/Amino Acids/Dextrose/ Fat Emulsion Intravenous 1,400 ml @  58.333 mls/

hr TPN  CONT IV  Last administered on 4/11/20at 21:21;  Start 4/11/20 at 22:00; 

Stop 4/12/20 at 21:59;  Status DC


Sodium Chloride 1,000 ml @  1,000 mls/hr Q1H PRN IV hypotension;  Start 4/11/20 

at 13:26;  Stop 4/11/20 at 19:25;  Status DC


Albumin Human 200 ml @  200 mls/hr 1X PRN  PRN IV Hypotension Last administered 

on 4/11/20at 15:00;  Start 4/11/20 at 13:30;  Stop 4/11/20 at 19:29;  Status DC


Sodium Chloride (Normal Saline Flush) 10 ml 1X PRN  PRN IV AP catheter pack;  

Start 4/11/20 at 13:30;  Stop 4/12/20 at 13:29;  Status DC


Sodium Chloride (Normal Saline Flush) 10 ml 1X PRN  PRN IV  catheter pack;  

Start 4/11/20 at 13:30;  Stop 4/12/20 at 13:29;  Status DC


Sodium Chloride 1,000 ml @  400 mls/hr Q2H30M PRN IV PATENCY;  Start 4/11/20 at 

13:26;  Stop 4/12/20 at 01:25;  Status DC


Info (PHARMACY MONITORING -- do not chart) 1 each PRN DAILY  PRN MC SEE 

COMMENTS;  Start 4/11/20 at 13:30;  Stop 4/11/20 at 13:33;  Status DC


Info (PHARMACY MONITORING -- do not chart) 1 each PRN DAILY  PRN MC SEE 

COMMENTS;  Start 4/11/20 at 13:30;  Stop 4/11/20 at 13:34;  Status DC


Sodium Chloride 90 meq/Potassium Phosphate 19 mmol/ Magnesium Sulfate 12 

meq/Calcium Gluconate 15 meq/ Multivitamins 10 ml/Chromium/ Copper/Manganese/ 

Seleni/Zn 0.5 ml/ Insulin Human Regular 40 unit/ Total Parenteral 

Nutrition/Amino Acids/Dextrose/ Fat Emulsion Intravenous 1,400 ml @  58.333 mls/

hr TPN  CONT IV  Last administered on 4/12/20at 21:54;  Start 4/12/20 at 22:00; 

Stop 4/13/20 at 21:59;  Status DC


Sodium Chloride 1,000 ml @  1,000 mls/hr Q1H PRN IV hypotension;  Start 4/13/20 

at 09:35;  Stop 4/13/20 at 15:34;  Status DC


Albumin Human 200 ml @  200 mls/hr 1X PRN  PRN IV Hypotension;  Start 4/13/20 at

09:45;  Stop 4/13/20 at 15:44;  Status DC


Diphenhydramine HCl (Benadryl) 25 mg 1X PRN  PRN IV ITCHING;  Start 4/13/20 at 

09:45;  Stop 4/14/20 at 09:44;  Status DC


Diphenhydramine HCl (Benadryl) 25 mg 1X PRN  PRN IV ITCHING;  Start 4/13/20 at 

09:45;  Stop 4/14/20 at 09:44;  Status DC


Sodium Chloride 1,000 ml @  400 mls/hr Q2H30M PRN IV PATENCY;  Start 4/13/20 at 

09:35;  Stop 4/13/20 at 21:34;  Status DC


Info (PHARMACY MONITORING -- do not chart) 1 each PRN DAILY  PRN MC SEE 

COMMENTS;  Start 4/13/20 at 09:45;  Status Cancel


Sodium Chloride 100 meq/Potassium Phosphate 19 mmol/ Magnesium Sulfate 12 

meq/Calcium Gluconate 15 meq/ Multivitamins 10 ml/Chromium/ Copper/Manganese/ 

Seleni/Zn 0.5 ml/ Insulin Human Regular 40 unit/ Potassium Chloride 20 meq/ 

Total Parenteral Nutrition/Amino Acids/Dextrose/ Fat Emulsion Intravenous 1,400 

ml @  58.333 mls/ hr TPN  CONT IV  Last administered on 4/13/20at 22:02;  Start 

4/13/20 at 22:00;  Stop 4/14/20 at 21:59;  Status DC


Furosemide (Lasix) 40 mg 1X  ONCE IVP  Last administered on 4/13/20at 14:39;  

Start 4/13/20 at 14:30;  Stop 4/13/20 at 14:31;  Status DC


Metronidazole 100 ml @  100 mls/hr Q8HRS IV  Last administered on 4/21/20at 

06:04;  Start 4/14/20 at 10:00;  Stop 4/21/20 at 08:10;  Status DC


Sodium Chloride 1,000 ml @  1,000 mls/hr Q1H PRN IV hypotension;  Start 4/14/20 

at 08:00;  Stop 4/14/20 at 13:59;  Status DC


Albumin Human 200 ml @  200 mls/hr 1X PRN  PRN IV Hypotension;  Start 4/14/20 at

08:00;  Stop 4/14/20 at 13:59;  Status DC


Sodium Chloride 1,000 ml @  400 mls/hr Q2H30M PRN IV PATENCY;  Start 4/14/20 at 

08:00;  Stop 4/14/20 at 19:59;  Status DC


Info (PHARMACY MONITORING -- do not chart) 1 each PRN DAILY  PRN MC SEE 

COMMENTS;  Start 4/14/20 at 11:30;  Status UNV


Info (PHARMACY MONITORING -- do not chart) 1 each PRN DAILY  PRN MC SEE 

COMMENTS;  Start 4/14/20 at 11:30;  Stop 4/16/20 at 12:13;  Status DC


Sodium Chloride 100 meq/Potassium Phosphate 19 mmol/ Magnesium Sulfate 12 

meq/Calcium Gluconate 15 meq/ Multivitamins 10 ml/Chromium/ Copper/Manganese/ 

Seleni/Zn 0.5 ml/ Insulin Human Regular 40 unit/ Potassium Chloride 20 meq/ 

Total Parenteral Nutrition/Amino Acids/Dextrose/ Fat Emulsion Intravenous 1,400 

ml @  58.333 mls/ hr TPN  CONT IV  Last administered on 4/14/20at 21:52;  Start 

4/14/20 at 22:00;  Stop 4/15/20 at 21:59;  Status DC


Sodium Chloride (Normal Saline Flush) 10 ml QSHIFT  PRN IV AFTER MEDS AND BLOOD 

DRAWS;  Start 4/14/20 at 15:00;  Stop 5/12/20 at 11:27;  Status DC


Sodium Chloride (Normal Saline Flush) 10 ml PRN Q5MIN  PRN IV AFTER MEDS AND 

BLOOD DRAWS;  Start 4/14/20 at 15:00


Sodium Chloride (Normal Saline Flush) 20 ml PRN Q5MIN  PRN IV AFTER MEDS AND 

BLOOD DRAWS;  Start 4/14/20 at 15:00


Sodium Chloride 100 meq/Potassium Phosphate 19 mmol/ Magnesium Sulfate 12 

meq/Calcium Gluconate 15 meq/ Multivitamins 10 ml/Chromium/ Copper/Manganese/ 

Seleni/Zn 0.5 ml/ Insulin Human Regular 40 unit/ Potassium Chloride 20 meq/ 

Total Parenteral Nutrition/Amino Acids/Dextrose/ Fat Emulsion Intravenous 1,400 

ml @  58.333 mls/ hr TPN  CONT IV  Last administered on 4/15/20at 21:20;  Start 

4/15/20 at 22:00;  Stop 4/16/20 at 21:59;  Status DC


Lidocaine HCl (Buffered Lidocaine 1%) 3 ml STK-MED ONCE .ROUTE ;  Start 4/15/20 

at 13:16;  Stop 4/15/20 at 13:16;  Status DC


Lidocaine HCl (Buffered Lidocaine 1%) 6 ml 1X  ONCE INJ  Last administered on 

4/15/20at 13:45;  Start 4/15/20 at 13:30;  Stop 4/15/20 at 13:31;  Status DC


Albumin Human 100 ml @  100 mls/hr 1X  ONCE IV  Last administered on 4/15/20at 

15:41;  Start 4/15/20 at 15:00;  Stop 4/15/20 at 15:59;  Status DC


Albumin Human 50 ml @ 50 mls/hr 1X  ONCE IV  Last administered on 4/15/20at 

15:00;  Start 4/15/20 at 15:00;  Stop 4/15/20 at 15:59;  Status DC


Info (PHARMACY MONITORING -- do not chart) 1 each PRN DAILY  PRN MC SEE 

COMMENTS;  Start 4/16/20 at 11:30;  Status Cancel


Info (PHARMACY MONITORING -- do not chart) 1 each PRN DAILY  PRN MC SEE 

COMMENTS;  Start 4/16/20 at 11:30;  Status UNV


Sodium Chloride 100 meq/Potassium Phosphate 10 mmol/ Magnesium Sulfate 12 

meq/Calcium Gluconate 15 meq/ Multivitamins 10 ml/Chromium/ Copper/Manganese/ 

Seleni/Zn 0.5 ml/ Insulin Human Regular 35 unit/ Potassium Chloride 20 meq/ 

Total Parenteral Nutrition/Amino Acids/Dextrose/ Fat Emulsion Intravenous 1,400 

ml @  58.333 mls/ hr TPN  CONT IV  Last administered on 4/16/20at 22:10;  Start 

4/16/20 at 22:00;  Stop 4/17/20 at 21:59;  Status DC


Sodium Chloride 100 meq/Potassium Phosphate 5 mmol/ Magnesium Sulfate 12 

meq/Calcium Gluconate 15 meq/ Multivitamins 10 ml/Chromium/ Copper/Manganese/ 

Seleni/Zn 0.5 ml/ Insulin Human Regular 35 unit/ Potassium Chloride 20 meq/ 

Total Parenteral Nutrition/Amino Acids/Dextrose/ Fat Emulsion Intravenous 1,400 

ml @  58.333 mls/ hr TPN  CONT IV  Last administered on 4/17/20at 22:59;  Start 

4/17/20 at 22:00;  Stop 4/18/20 at 21:59;  Status DC


Sodium Chloride 1,000 ml @  1,000 mls/hr Q1H PRN IV hypotension;  Start 4/18/20 

at 08:27;  Stop 4/18/20 at 14:26;  Status DC


Albumin Human 200 ml @  200 mls/hr 1X PRN  PRN IV Hypotension Last administered 

on 4/18/20at 09:18;  Start 4/18/20 at 08:30;  Stop 4/18/20 at 14:29;  Status DC


Sodium Chloride 1,000 ml @  400 mls/hr Q2H30M PRN IV PATENCY;  Start 4/18/20 at 

08:27;  Stop 4/18/20 at 20:26;  Status DC


Info (PHARMACY MONITORING -- do not chart) 1 each PRN DAILY  PRN MC SEE 

COMMENTS;  Start 4/18/20 at 08:30;  Status Cancel


Info (PHARMACY MONITORING -- do not chart) 1 each PRN DAILY  PRN MC SEE 

COMMENTS;  Start 4/18/20 at 08:30;  Stop 4/26/20 at 13:10;  Status DC


Sodium Chloride 100 meq/Potassium Chloride 40 meq/ Magnesium Sulfate 15 

meq/Calcium Gluconate 15 meq/ Multivitamins 10 ml/Chromium/ Copper/Manganese/ 

Seleni/Zn 0.5 ml/ Insulin Human Regular 35 unit/ Total Parenteral Nutrit

ion/Amino Acids/Dextrose/ Fat Emulsion Intravenous 1,400 ml @  58.333 mls/ hr 

TPN  CONT IV  Last administered on 4/18/20at 22:00;  Start 4/18/20 at 22:00;  

Stop 4/19/20 at 21:59;  Status DC


Potassium Chloride/Water 100 ml @  100 mls/hr 1X  ONCE IV  Last administered on 

4/18/20at 17:28;  Start 4/18/20 at 14:45;  Stop 4/18/20 at 15:44;  Status DC


Sodium Chloride 100 meq/Potassium Chloride 40 meq/ Magnesium Sulfate 15 

meq/Calcium Gluconate 15 meq/ Multivitamins 10 ml/Chromium/ Copper/Manganese/ 

Seleni/Zn 0.5 ml/ Insulin Human Regular 35 unit/ Total Parenteral 

Nutrition/Amino Acids/Dextrose/ Fat Emulsion Intravenous 1,400 ml @  58.333 mls/

hr TPN  CONT IV  Last administered on 4/19/20at 22:46;  Start 4/19/20 at 22:00; 

Stop 4/20/20 at 21:59;  Status DC


Sodium Chloride 100 meq/Potassium Chloride 40 meq/ Magnesium Sulfate 20 

meq/Calcium Gluconate 15 meq/ Multivitamins 10 ml/Chromium/ Copper/Manganese/ 

Seleni/Zn 0.5 ml/ Insulin Human Regular 35 unit/ Total Parenteral 

Nutrition/Amino Acids/Dextrose/ Fat Emulsion Intravenous 1,400 ml @  58.333 mls/

hr TPN  CONT IV  Last administered on 4/20/20at 22:31;  Start 4/20/20 at 22:00; 

Stop 4/21/20 at 21:59;  Status DC


Fentanyl Citrate (Fentanyl 2ml Vial) 50 mcg PRN Q2HR  PRN IVP PAIN Last 

administered on 4/27/20at 13:32;  Start 4/20/20 at 21:00;  Stop 4/28/20 at 

12:53;  Status DC


Fentanyl Citrate (Fentanyl 2ml Vial) 25 mcg PRN Q2HR  PRN IVP PAIN;  Start 

4/20/20 at 21:00;  Stop 4/28/20 at 12:54;  Status DC


Enoxaparin Sodium (Lovenox 100mg Syringe) 100 mg Q12HR SQ ;  Start 4/21/20 at 

21:00;  Status UNV


Amino Acids/ Glycerin/ Electrolytes 1,000 ml @  75 mls/hr [id number] IV ;  Start 

4/20/20 at 21:15;  Status UNV


Sodium Chloride 1,000 ml @  1,000 mls/hr Q1H PRN IV hypotension;  Start 4/21/20 

at 07:56;  Stop 4/21/20 at 13:55;  Status DC


Albumin Human 200 ml @  200 mls/hr 1X PRN  PRN IV Hypotension Last administered 

on 4/21/20at 08:40;  Start 4/21/20 at 08:00;  Stop 4/21/20 at 13:59;  Status DC


Sodium Chloride 1,000 ml @  400 mls/hr Q2H30M PRN IV PATENCY;  Start 4/21/20 at 

07:56;  Stop 4/21/20 at 19:55;  Status DC


Info (PHARMACY MONITORING -- do not chart) 1 each PRN DAILY  PRN MC SEE 

COMMENTS;  Start 4/21/20 at 08:00;  Status UNV


Info (PHARMACY MONITORING -- do not chart) 1 each PRN DAILY  PRN MC SEE 

COMMENTS;  Start 4/21/20 at 08:00;  Status UNV


Daptomycin 430 mg/ Sodium Chloride 50 ml @  100 mls/hr Q24H IV  Last 

administered on 4/21/20at 12:35;  Start 4/21/20 at 09:00;  Stop 4/21/20 at 

12:49;  Status DC


Sodium Chloride 100 meq/Potassium Chloride 40 meq/ Magnesium Sulfate 20 

meq/Calcium Gluconate 15 meq/ Multivitamins 10 ml/Chromium/ Copper/Manganese/ 

Seleni/Zn 0.5 ml/ Insulin Human Regular 35 unit/ Total Parenteral 

Nutrition/Amino Acids/Dextrose/ Fat Emulsion Intravenous 1,400 ml @  58.333 mls/

hr TPN  CONT IV  Last administered on 4/21/20at 21:26;  Start 4/21/20 at 22:00; 

Stop 4/22/20 at 21:59;  Status DC


Daptomycin 430 mg/ Sodium Chloride 50 ml @  100 mls/hr Q48H IV ;  Start 4/23/20 

at 09:00;  Stop 4/22/20 at 11:55;  Status DC


Sodium Chloride 100 meq/Potassium Chloride 40 meq/ Magnesium Sulfate 20 

meq/Calcium Gluconate 15 meq/ Multivitamins 10 ml/Chromium/ Copper/Manganese/ 

Seleni/Zn 0.5 ml/ Insulin Human Regular 35 unit/ Total Parenteral 

Nutrition/Amino Acids/Dextrose/ Fat Emulsion Intravenous 1,400 ml @  58.333 mls/

hr TPN  CONT IV  Last administered on 4/22/20at 22:27;  Start 4/22/20 at 22:00; 

Stop 4/23/20 at 21:59;  Status DC


Daptomycin 430 mg/ Sodium Chloride 50 ml @  100 mls/hr Q24H IV  Last 

administered on 4/24/20at 15:07;  Start 4/22/20 at 13:00;  Stop 4/25/20 at 

13:15;  Status DC


Sodium Chloride 100 meq/Potassium Chloride 40 meq/ Magnesium Sulfate 20 

meq/Calcium Gluconate 10 meq/ Multivitamins 10 ml/Chromium/ Copper/Manganese/ 

Seleni/Zn 0.5 ml/ Insulin Human Regular 35 unit/ Total Parenteral 

Nutrition/Amino Acids/Dextrose/ Fat Emulsion Intravenous 1,400 ml @  58.333 mls/

hr TPN  CONT IV  Last administered on 4/24/20at 00:06;  Start 4/23/20 at 22:00; 

Stop 4/24/20 at 21:59;  Status DC


Alteplase, Recombinant (Cathflo For Central Catheter Clearance) 1 mg 1X  ONCE 

INT CAT  Last administered on 4/24/20at 11:44;  Start 4/24/20 at 10:45;  Stop 

4/24/20 at 10:46;  Status DC


Ondansetron HCl (Zofran) 4 mg PRN Q6HRS  PRN IV NAUSEA/VOMITING;  Start 4/27/20 

at 07:00;  Stop 4/28/20 at 06:59;  Status DC


Fentanyl Citrate (Fentanyl 2ml Vial) 25 mcg PRN Q5MIN  PRN IV MILD PAIN 1-3;  

Start 4/27/20 at 07:00;  Stop 4/28/20 at 06:59;  Status DC


Fentanyl Citrate (Fentanyl 2ml Vial) 50 mcg PRN Q5MIN  PRN IV MODERATE TO SEVERE

PAIN Last administered on 4/27/20at 10:17;  Start 4/27/20 at 07:00;  Stop 

4/28/20 at 06:59;  Status DC


Ringer's Solution 1,000 ml @  30 mls/hr Q24H IV ;  Start 4/27/20 at 07:00;  Stop

4/27/20 at 18:59;  Status DC


Lidocaine HCl (Xylocaine-Mpf 1% 2ml Vial) 2 ml PRN 1X  PRN ID PRIOR TO IV START;

 Start 4/27/20 at 07:00;  Stop 4/28/20 at 06:59;  Status DC


Prochlorperazine Edisylate (Compazine) 5 mg PACU PRN  PRN IV NAUSEA, MRX1;  

Start 4/27/20 at 07:00;  Stop 4/28/20 at 06:59;  Status DC


Sodium Acetate 50 meq/Potassium Acetate 55 meq/ Magnesium Sulfate 20 meq/Calcium

Gluconate 10 meq/ Multivitamins 10 ml/Chromium/ Copper/Manganese/ Seleni/Zn 0.5 

ml/ Insulin Human Regular 35 unit/ Total Parenteral Nutrition/Amino Aci

ds/Dextrose/ Fat Emulsion Intravenous 1,400 ml @  58.333 mls/ hr TPN  CONT IV ; 

Start 4/24/20 at 22:00;  Stop 4/24/20 at 14:15;  Status DC


Sodium Acetate 50 meq/Potassium Acetate 55 meq/ Magnesium Sulfate 20 meq/Calcium

Gluconate 10 meq/ Multivitamins 10 ml/Chromium/ Copper/Manganese/ Seleni/Zn 0.5 

ml/ Insulin Human Regular 35 unit/ Total Parenteral Nutrition/Amino 

Acids/Dextrose/ Fat Emulsion Intravenous 1,800 ml @  75 mls/hr TPN  CONT IV  

Last administered on 4/24/20at 22:38;  Start 4/24/20 at 22:00;  Stop 4/25/20 at 

21:59;  Status DC


Sodium Chloride 1,000 ml @  1,000 mls/hr Q1H PRN IV hypotension;  Start 4/24/20 

at 15:31;  Stop 4/24/20 at 21:30;  Status DC


Diphenhydramine HCl (Benadryl) 25 mg 1X PRN  PRN IV ITCHING;  Start 4/24/20 at 

15:45;  Stop 4/25/20 at 15:44;  Status DC


Diphenhydramine HCl (Benadryl) 25 mg 1X PRN  PRN IV ITCHING;  Start 4/24/20 at 

15:45;  Stop 4/25/20 at 15:44;  Status DC


Sodium Chloride 1,000 ml @  400 mls/hr Q2H30M PRN IV PATENCY;  Start 4/24/20 at 

15:31;  Stop 4/25/20 at 03:30;  Status DC


Info (PHARMACY MONITORING -- do not chart) 1 each PRN DAILY  PRN MC SEE 

COMMENTS;  Start 4/24/20 at 15:45;  Stop 5/26/20 at 14:14;  Status DC


Sodium Acetate 50 meq/Potassium Acetate 55 meq/ Magnesium Sulfate 20 meq/Calcium

Gluconate 10 meq/ Multivitamins 10 ml/Chromium/ Copper/Manganese/ Seleni/Zn 0.5 

ml/ Insulin Human Regular 35 unit/ Total Parenteral Nutrition/Amino Acid

s/Dextrose/ Fat Emulsion Intravenous 1,800 ml @  75 mls/hr TPN  CONT IV  Last 

administered on 4/25/20at 22:03;  Start 4/25/20 at 22:00;  Stop 4/26/20 at 

21:59;  Status DC


Daptomycin 430 mg/ Sodium Chloride 50 ml @  100 mls/hr Q24H IV  Last 

administered on 4/30/20at 13:00;  Start 4/25/20 at 13:00;  Stop 4/30/20 at 

20:58;  Status DC


Heparin Sodium (Porcine) 1000 unit/Sodium Chloride 1,001 ml @  1,001 mls/hr 1X  

ONCE IRR ;  Start 4/27/20 at 06:00;  Stop 4/27/20 at 06:59;  Status DC


Potassium Acetate 55 meq/Magnesium Sulfate 20 meq/ Calcium Gluconate 10 meq/ 

Multivitamins 10 ml/Chromium/ Copper/Manganese/ Seleni/Zn 0.5 ml/ Insulin Human 

Regular 35 unit/ Total Parenteral Nutrition/Amino Acids/Dextrose/ Fat Emulsion 

Intravenous 1,920 ml @  80 mls/hr TPN  CONT IV  Last administered on 4/26/20at 

22:10;  Start 4/26/20 at 22:00;  Stop 4/27/20 at 21:59;  Status DC


Dexamethasone Sodium Phosphate (Decadron) 4 mg STK-MED ONCE .ROUTE ;  Start 

4/27/20 at 10:56;  Stop 4/27/20 at 10:57;  Status DC


Ondansetron HCl (Zofran) 4 mg STK-MED ONCE .ROUTE ;  Start 4/27/20 at 10:56;  

Stop 4/27/20 at 10:57;  Status DC


Rocuronium Bromide (Zemuron) 50 mg STK-MED ONCE .ROUTE ;  Start 4/27/20 at 

10:56;  Stop 4/27/20 at 10:57;  Status DC


Fentanyl Citrate (Fentanyl 2ml Vial) 100 mcg STK-MED ONCE .ROUTE ;  Start 

4/27/20 at 10:56;  Stop 4/27/20 at 10:57;  Status DC


Bupivacaine HCl/ Epinephrine Bitart (Sensorcain-Epi 0.5%-1:318227 Mpf) 30 ml 

STK-MED ONCE .ROUTE  Last administered on 4/27/20at 12:01;  Start 4/27/20 at 

10:58;  Stop 4/27/20 at 10:58;  Status DC


Cellulose (Surgicel Hemostat 2x14) 1 each STK-MED ONCE .ROUTE ;  Start 4/27/20 

at 10:58;  Stop 4/27/20 at 10:59;  Status DC


Iohexol (Omnipaque 300 Mg/ml) 50 ml STK-MED ONCE .ROUTE ;  Start 4/27/20 at 

10:58;  Stop 4/27/20 at 10:59;  Status DC


Cellulose (Surgicel Hemostat 4x8) 1 each STK-MED ONCE .ROUTE ;  Start 4/27/20 at

10:58;  Stop 4/27/20 at 10:59;  Status DC


Bisacodyl (Dulcolax Supp) 10 mg STK-MED ONCE .ROUTE ;  Start 4/27/20 at 10:59;  

Stop 4/27/20 at 10:59;  Status DC


Heparin Sodium (Porcine) 1000 unit/Sodium Chloride 1,001 ml @  1,001 mls/hr 1X  

ONCE IRR ;  Start 4/27/20 at 12:00;  Stop 4/27/20 at 12:59;  Status DC


Propofol 20 ml @ As Directed STK-MED ONCE IV ;  Start 4/27/20 at 11:05;  Stop 

4/27/20 at 11:05;  Status DC


Sevoflurane (Ultane) 90 ml STK-MED ONCE IH ;  Start 4/27/20 at 11:05;  Stop 

4/27/20 at 11:05;  Status DC


Sevoflurane (Ultane) 60 ml STK-MED ONCE IH ;  Start 4/27/20 at 12:26;  Stop 

4/27/20 at 12:27;  Status DC


Propofol 20 ml @ As Directed STK-MED ONCE IV ;  Start 4/27/20 at 12:26;  Stop 

4/27/20 at 12:27;  Status DC


Phenylephrine HCl (PHENYLEPHRINE in 0.9% NACL PF) 1 mg STK-MED ONCE IV ;  Start 

4/27/20 at 12:34;  Stop 4/27/20 at 12:34;  Status DC


Heparin Sodium (Porcine) (Heparin Sodium) 5,000 unit Q12HR SQ  Last administered

on 5/6/20at 20:57;  Start 4/27/20 at 21:00;  Stop 5/7/20 at 09:59;  Status DC


Sodium Chloride (Normal Saline Flush) 3 ml QSHIFT  PRN IV AFTER MEDS AND BLOOD 

DRAWS;  Start 4/27/20 at 13:45;  Status Cancel


Naloxone HCl (Narcan) 0.4 mg PRN Q2MIN  PRN IV SEE INSTRUCTIONS Last 

administered on 6/6/20at 15:15;  Start 4/27/20 at 13:45;  Stop 7/1/20 at 16:00; 

Status DC


Sodium Chloride 1,000 ml @  25 mls/hr Q24H IV  Last administered on 5/26/20at 

13:37;  Start 4/27/20 at 13:37;  Stop 5/29/20 at 13:09;  Status DC


Naloxone HCl (Narcan) 0.4 mg PRN Q2MIN  PRN IV SEE INSTRUCTIONS;  Start 4/27/20 

at 14:30;  Status UNV


Sodium Chloride 1,000 ml @  25 mls/hr Q24H IV ;  Start 4/27/20 at 14:30;  Status

UNV


Hydromorphone HCl 30 ml @ 0 mls/hr CONT PRN  PRN IV PER PROTOCOL Last adm

inistered on 5/2/20at 16:08;  Start 4/27/20 at 14:30;  Stop 5/4/20 at 08:55;  

Status DC


Potassium Acetate 55 meq/Magnesium Sulfate 20 meq/ Calcium Gluconate 10 meq/ 

Multivitamins 10 ml/Chromium/ Copper/Manganese/ Seleni/Zn 0.5 ml/ Insulin Human 

Regular 35 unit/ Total Parenteral Nutrition/Amino Acids/Dextrose/ Fat Emulsion 

Intravenous 1,920 ml @  80 mls/hr TPN  CONT IV  Last administered on 4/27/20at 

22:01;  Start 4/27/20 at 22:00;  Stop 4/28/20 at 21:59;  Status DC


Bumetanide (Bumex) 2 mg [id number] IV  Last administered on 5/1/20at 13:50;  Start 

4/28/20 at 14:00;  Stop 5/2/20 at 14:10;  Status DC


Meropenem 1 gm/ Sodium Chloride 100 ml @  200 mls/hr Q8HRS IV  Last administered

on 5/22/20at 05:53;  Start 4/28/20 at 14:00;  Stop 5/22/20 at 09:31;  Status DC


Potassium Acetate 55 meq/Magnesium Sulfate 20 meq/ Calcium Gluconate 10 meq/ 

Multivitamins 10 ml/Chromium/ Copper/Manganese/ Seleni/Zn 0.5 ml/ Insulin Human 

Regular 35 unit/ Total Parenteral Nutrition/Amino Acids/Dextrose/ Fat Emulsion 

Intravenous 1,920 ml @  80 mls/hr TPN  CONT IV  Last administered on 4/28/20at 

22:02;  Start 4/28/20 at 22:00;  Stop 4/29/20 at 21:59;  Status DC


Hydromorphone HCl (Dilaudid Standard PCA) 12 mg STK-MED ONCE IV ;  Start 4/27/20

at 14:35;  Stop 4/28/20 at 13:53;  Status DC


Artificial Tears (Artificial Tears) 1 drop PRN Q15MIN  PRN OU DRY EYE Last 

administered on 6/23/20at 21:17;  Start 4/29/20 at 05:30


Hydromorphone HCl (Dilaudid Standard PCA) 12 mg STK-MED ONCE IV ;  Start 4/28/20

at 12:05;  Stop 4/29/20 at 09:15;  Status DC


Potassium Acetate 65 meq/Magnesium Sulfate 20 meq/ Calcium Gluconate 10 meq/ 

Multivitamins 10 ml/Chromium/ Copper/Manganese/ Seleni/Zn 0.5 ml/ Insulin Human 

Regular 30 unit/ Total Parenteral Nutrition/Amino Acids/Dextrose/ Fat Emulsion 

Intravenous 1,920 ml @  80 mls/hr TPN  CONT IV  Last administered on 4/29/20at 

22:22;  Start 4/29/20 at 22:00;  Stop 4/30/20 at 21:59;  Status DC


Cyclobenzaprine HCl (Flexeril) 10 mg PRN Q6HRS  PRN PO MUSCLE SPASMS;  Start 

4/30/20 at 10:45


Potassium Acetate 55 meq/Magnesium Sulfate 20 meq/ Calcium Gluconate 10 meq/ 

Multivitamins 10 ml/Chromium/ Copper/Manganese/ Seleni/Zn 0.5 ml/ Insulin Human 

Regular 30 unit/ Total Parenteral Nutrition/Amino Acids/Dextrose/ Fat Emulsion 

Intravenous 1,920 ml @  80 mls/hr TPN  CONT IV  Last administered on 5/1/20at 

01:00;  Start 4/30/20 at 22:00;  Stop 5/1/20 at 21:59;  Status DC


Magnesium Sulfate 50 ml @ 25 mls/hr 1X  ONCE IV  Last administered on 4/30/20at 

17:18;  Start 4/30/20 at 12:45;  Stop 4/30/20 at 14:44;  Status DC


Potassium Chloride/Water 100 ml @  100 mls/hr 1X  ONCE IV  Last administered on 

5/1/20at 11:27;  Start 5/1/20 at 12:00;  Stop 5/1/20 at 12:59;  Status DC


Hydromorphone HCl (Dilaudid Standard PCA) 12 mg STK-MED ONCE IV ;  Start 4/29/20

at 10:50;  Stop 5/1/20 at 11:02;  Status DC


Hydromorphone HCl (Dilaudid Standard PCA) 12 mg STK-MED ONCE IV ;  Start 4/30/20

at 13:47;  Stop 5/1/20 at 11:03;  Status DC


Potassium Acetate 30 meq/Magnesium Sulfate 20 meq/ Calcium Gluconate 10 meq/ 

Multivitamins 10 ml/Chromium/ Copper/Manganese/ Seleni/Zn 0.5 ml/ Insulin Human 

Regular 30 unit/ Potassium Chloride 30 meq/ Total Parenteral Nutrition/Amino 

Acids/Dextrose/ Fat Emulsion Intravenous 1,920 ml @  80 mls/hr TPN  CONT IV  

Last administered on 5/1/20at 22:34;  Start 5/1/20 at 22:00;  Stop 5/2/20 at 

21:59;  Status DC


Potassium Chloride/Water 100 ml @  100 mls/hr Q1H IV  Last administered on 

5/2/20at 13:05;  Start 5/2/20 at 07:00;  Stop 5/2/20 at 10:59;  Status DC


Magnesium Sulfate 50 ml @ 25 mls/hr 1X  ONCE IV  Last administered on 5/2/20at 

10:34;  Start 5/2/20 at 10:30;  Stop 5/2/20 at 12:29;  Status DC


Potassium Chloride 75 meq/ Magnesium Sulfate 20 meq/Calcium Gluconate 10 meq/ M

ultivitamins 10 ml/Chromium/ Copper/Manganese/ Seleni/Zn 0.5 ml/ Insulin Human 

Regular 30 unit/ Total Parenteral Nutrition/Amino Acids/Dextrose/ Fat Emulsion 

Intravenous 1,920 ml @  80 mls/hr TPN  CONT IV  Last administered on 5/2/20at 

21:51;  Start 5/2/20 at 22:00;  Stop 5/3/20 at 22:00;  Status DC


Potassium Chloride 75 meq/ Magnesium Sulfate 20 meq/Calcium Gluconate 10 meq/ 

Multivitamins 10 ml/Chromium/ Copper/Manganese/ Seleni/Zn 0.5 ml/ Insulin Human 

Regular 25 unit/ Total Parenteral Nutrition/Amino Acids/Dextrose/ Fat Emulsion 

Intravenous 1,920 ml @  80 mls/hr TPN  CONT IV  Last administered on 5/3/20at 

22:04;  Start 5/3/20 at 22:00;  Stop 5/4/20 at 21:59;  Status DC


Hydromorphone HCl (Dilaudid) 0.4 mg PRN Q4HRS  PRN IVP PAIN Last administered on

5/4/20at 10:57;  Start 5/4/20 at 09:00;  Stop 5/4/20 at 18:59;  Status DC


Micafungin Sodium 100 mg/Dextrose 100 ml @  100 mls/hr Q24H IV  Last 

administered on 5/26/20at 12:17;  Start 5/4/20 at 11:00;  Stop 5/27/20 at 09:59;

 Status DC


Daptomycin 485 mg/ Sodium Chloride 50 ml @  100 mls/hr Q24H IV  Last 

administered on 5/11/20at 13:10;  Start 5/4/20 at 11:00;  Stop 5/12/20 at 07:44;

 Status DC


Potassium Chloride 75 meq/ Magnesium Sulfate 15 meq/Calcium Gluconate 8 meq/ 

Multivitamins 10 ml/Chromium/ Copper/Manganese/ Seleni/Zn 0.5 ml/ Insulin Human 

Regular 25 unit/ Total Parenteral Nutrition/Amino Acids/Dextrose/ Fat Emulsion 

Intravenous 1,920 ml @  80 mls/hr TPN  CONT IV  Last administered on 5/4/20at 

23:08;  Start 5/4/20 at 22:00;  Stop 5/5/20 at 21:59;  Status DC


Haloperidol Lactate (Haldol Inj) 3 mg 1X  ONCE IVP  Last administered on 

5/4/20at 14:37;  Start 5/4/20 at 14:30;  Stop 5/4/20 at 14:31;  Status DC


Hydromorphone HCl (Dilaudid) 1 mg PRN Q4HRS  PRN IVP PAIN Last administered on 

5/18/20at 06:25;  Start 5/4/20 at 19:00;  Stop 5/18/20 at 17:10;  Status DC


Potassium Chloride 75 meq/ Magnesium Sulfate 15 meq/Calcium Gluconate 8 meq/ 

Multivitamins 10 ml/Chromium/ Copper/Manganese/ Seleni/Zn 0.5 ml/ Insulin Human 

Regular 20 unit/ Total Parenteral Nutrition/Amino Acids/Dextrose/ Fat Emulsion 

Intravenous 1,920 ml @  80 mls/hr TPN  CONT IV  Last administered on 5/5/20at 

22:10;  Start 5/5/20 at 22:00;  Stop 5/6/20 at 21:59;  Status DC


Lidocaine HCl (Buffered Lidocaine 1%) 3 ml STK-MED ONCE .ROUTE ;  Start 5/6/20 

at 11:31;  Stop 5/6/20 at 11:31;  Status DC


Lidocaine HCl (Buffered Lidocaine 1%) 3 ml STK-MED ONCE .ROUTE ;  Start 5/6/20 

at 12:28;  Stop 5/6/20 at 12:29;  Status DC


Lidocaine HCl (Buffered Lidocaine 1%) 6 ml 1X  ONCE INJ  Last administered on 

5/6/20at 12:53;  Start 5/6/20 at 12:45;  Stop 5/6/20 at 12:46;  Status DC


Potassium Chloride 75 meq/ Magnesium Sulfate 15 meq/Calcium Gluconate 8 meq/ 

Multivitamins 10 ml/Chromium/ Copper/Manganese/ Seleni/Zn 0.5 ml/ Insulin Human 

Regular 20 unit/ Total Parenteral Nutrition/Amino Acids/Dextrose/ Fat Emulsion 

Intravenous 1,920 ml @  80 mls/hr TPN  CONT IV  Last administered on 5/6/20at 

22:00;  Start 5/6/20 at 22:00;  Stop 5/7/20 at 21:59;  Status DC


Potassium Chloride 75 meq/ Magnesium Sulfate 15 meq/Calcium Gluconate 8 meq/ 

Multivitamins 10 ml/Chromium/ Copper/Manganese/ Seleni/Zn 0.5 ml/ Insulin Human 

Regular 15 unit/ Total Parenteral Nutrition/Amino Acids/Dextrose/ Fat Emulsion 

Intravenous 1,920 ml @  80 mls/hr TPN  CONT IV  Last administered on 5/7/20at 

22:28;  Start 5/7/20 at 22:00;  Stop 5/8/20 at 21:59;  Status DC


Vecuronium Bromide (Norcuron Bolus) 6 mg PRN Q6HRS  PRN IV VENT ASYNCHRONY;  

Start 5/7/20 at 19:15;  Stop 5/7/20 at 19:35;  Status DC


Bumetanide (Bumex) 2 mg 1X  ONCE IV  Last administered on 5/7/20at 22:09;  Start

5/7/20 at 19:45;  Stop 5/7/20 at 19:46;  Status DC


Lidocaine HCl (Buffered Lidocaine 1%) 3 ml STK-MED ONCE .ROUTE ;  Start 5/8/20 

at 07:59;  Stop 5/8/20 at 07:59;  Status DC


Midazolam HCl (Versed) 5 mg STK-MED ONCE .ROUTE ;  Start 5/8/20 at 08:36;  Stop 

5/8/20 at 08:36;  Status DC


Fentanyl Citrate (Fentanyl 5ml Vial) 250 mcg STK-MED ONCE .ROUTE ;  Start 5/8/20

at 08:36;  Stop 5/8/20 at 08:37;  Status DC


Lidocaine HCl (Buffered Lidocaine 1%) 3 ml 1X  ONCE IJ  Last administered on 

5/8/20at 09:30;  Start 5/8/20 at 09:15;  Stop 5/8/20 at 09:16;  Status DC


Midazolam HCl (Versed) 5 mg 1X  ONCE IV  Last administered on 5/8/20at 09:30;  

Start 5/8/20 at 09:15;  Stop 5/8/20 at 09:16;  Status DC


Fentanyl Citrate (Fentanyl 5ml Vial) 250 mcg 1X  ONCE IV  Last administered on 

5/8/20at 09:30;  Start 5/8/20 at 09:15;  Stop 5/8/20 at 09:16;  Status DC


Bumetanide (Bumex) 2 mg DAILY IV  Last administered on 5/18/20at 08:07;  Start 

5/8/20 at 10:00;  Stop 5/18/20 at 17:15;  Status DC


Potassium Chloride 75 meq/ Magnesium Sulfate 15 meq/ Multivitamins 10 

ml/Chromium/ Copper/Manganese/ Seleni/Zn 0.5 ml/ Insulin Human Regular 15 unit/ 

Total Parenteral Nutrition/Amino Acids/Dextrose/ Fat Emulsion Intravenous 1,920 

ml @  80 mls/hr TPN  CONT IV  Last administered on 5/8/20at 21:59;  Start 5/8/20

at 22:00;  Stop 5/9/20 at 21:59;  Status DC


Metoclopramide HCl (Reglan Vial) 10 mg PRN Q3HRS  PRN IVP NAUSEA/VOMITING-3rd 

choice Last administered on 5/14/20at 04:25;  Start 5/9/20 at 16:45


Potassium Chloride 75 meq/ Magnesium Sulfate 15 meq/ Multivitamins 10 

ml/Chromium/ Copper/Manganese/ Seleni/Zn 0.5 ml/ Insulin Human Regular 15 unit/ 

Total Parenteral Nutrition/Amino Acids/Dextrose/ Fat Emulsion Intravenous 1,920 

ml @  80 mls/hr TPN  CONT IV  Last administered on 5/9/20at 22:41;  Start 5/9/20

at 22:00;  Stop 5/10/20 at 21:59;  Status DC


Magnesium Sulfate 50 ml @ 25 mls/hr 1X  ONCE IV  Last administered on 5/10/20at 

10:44;  Start 5/10/20 at 09:00;  Stop 5/10/20 at 10:59;  Status DC


Potassium Chloride/Water 100 ml @  100 mls/hr 1X  ONCE IV  Last administered on 

5/10/20at 09:37;  Start 5/10/20 at 09:00;  Stop 5/10/20 at 09:59;  Status DC


Duloxetine HCl (Cymbalta) 30 mg DAILY PO  Last administered on 5/11/20at 09:48; 

Start 5/10/20 at 14:00;  Stop 5/13/20 at 10:25;  Status DC


Potassium Chloride 80 meq/ Magnesium Sulfate 20 meq/ Multivitamins 10 

ml/Chromium/ Copper/Manganese/ Seleni/Zn 0.5 ml/ Insulin Human Regular 15 unit/ 

Total Parenteral Nutrition/Amino Acids/Dextrose/ Fat Emulsion Intravenous 1,920 

ml @  80 mls/hr TPN  CONT IV  Last administered on 5/10/20at 21:42;  Start 

5/10/20 at 22:00;  Stop 5/11/20 at 21:59;  Status DC


Potassium Chloride 80 meq/ Magnesium Sulfate 20 meq/ Multivitamins 10 

ml/Chromium/ Copper/Manganese/ Seleni/Zn 0.5 ml/ Insulin Human Regular 15 unit/ 

Total Parenteral Nutrition/Amino Acids/Dextrose/ Fat Emulsion Intravenous 1,920 

ml @  80 mls/hr TPN  CONT IV  Last administered on 5/11/20at 22:20;  Start 

5/11/20 at 22:00;  Stop 5/12/20 at 21:59;  Status DC


Lidocaine HCl (Buffered Lidocaine 1%) 3 ml STK-MED ONCE .ROUTE ;  Start 5/12/20 

at 09:54;  Stop 5/12/20 at 09:55;  Status DC


Hydromorphone HCl (Dilaudid Standard PCA) 12 mg STK-MED ONCE IV ;  Start 5/1/20 

at 15:50;  Stop 5/12/20 at 11:24;  Status DC


Potassium Chloride 80 meq/ Magnesium Sulfate 20 meq/ Multivitamins 10 ml/Chrom

ium/ Copper/Manganese/ Seleni/Zn 0.5 ml/ Insulin Human Regular 15 unit/ Total 

Parenteral Nutrition/Amino Acids/Dextrose/ Fat Emulsion Intravenous 1,920 ml @  

80 mls/hr TPN  CONT IV  Last administered on 5/12/20at 21:40;  Start 5/12/20 at 

22:00;  Stop 5/13/20 at 21:59;  Status DC


Lidocaine HCl (Buffered Lidocaine 1%) 6 ml 1X  ONCE INJ  Last administered on 

5/12/20at 14:15;  Start 5/12/20 at 14:15;  Stop 5/12/20 at 14:16;  Status DC


Potassium Chloride 80 meq/ Magnesium Sulfate 20 meq/ Multivitamins 10 ml/

mium/ Copper/Manganese/ Seleni/Zn 1 ml/ Insulin Human Regular 15 unit/ Total 

Parenteral Nutrition/Amino Acids/Dextrose/ Fat Emulsion Intravenous 1,920 ml @  

80 mls/hr TPN  CONT IV  Last administered on 5/13/20at 22:04;  Start 5/13/20 at 

22:00;  Stop 5/14/20 at 21:59;  Status DC


Potassium Chloride/Water 100 ml @  100 mls/hr 1X  ONCE IV  Last administered on 

5/14/20at 11:34;  Start 5/14/20 at 11:00;  Stop 5/14/20 at 11:59;  Status DC


Potassium Chloride 90 meq/ Magnesium Sulfate 20 meq/ Multivitamins 10 ml/Chr

omium/ Copper/Manganese/ Seleni/Zn 1 ml/ Insulin Human Regular 15 unit/ Total 

Parenteral Nutrition/Amino Acids/Dextrose/ Fat Emulsion Intravenous 1,920 ml @  

80 mls/hr TPN  CONT IV  Last administered on 5/14/20at 22:57;  Start 5/14/20 at 

22:00;  Stop 5/15/20 at 21:59;  Status DC


Potassium Chloride 90 meq/ Magnesium Sulfate 20 meq/ Multivitamins 10 

ml/Chromium/ Copper/Manganese/ Seleni/Zn 1 ml/ Insulin Human Regular 15 unit/ 

Total Parenteral Nutrition/Amino Acids/Dextrose/ Fat Emulsion Intravenous 1,920 

ml @  80 mls/hr TPN  CONT IV  Last administered on 5/15/20at 22:48;  Start 

5/15/20 at 22:00;  Stop 5/16/20 at 21:59;  Status DC


Potassium Chloride 90 meq/ Magnesium Sulfate 20 meq/ Multivitamins 10 

ml/Chromium/ Copper/Manganese/ Seleni/Zn 1 ml/ Insulin Human Regular 15 unit/ 

Total Parenteral Nutrition/Amino Acids/Dextrose/ Fat Emulsion Intravenous 1,890 

ml @  78.75 mls/ hr TPN  CONT IV  Last administered on 5/16/20at 22:15;  Start 

5/16/20 at 22:00;  Stop 5/17/20 at 21:59;  Status DC


Linezolid/Dextrose 300 ml @  300 mls/hr Q12HR IV  Last administered on 5/19/20at

21:08;  Start 5/17/20 at 09:00;  Stop 5/20/20 at 08:11;  Status DC


Daptomycin 450 mg/ Sodium Chloride 50 ml @  100 mls/hr Q24H IV  Last 

administered on 5/20/20at 09:25;  Start 5/17/20 at 09:00;  Stop 5/21/20 at 

08:30;  Status DC


Potassium Chloride 90 meq/ Magnesium Sulfate 20 meq/ Multivitamins 10 

ml/Chromium/ Copper/Manganese/ Seleni/Zn 1 ml/ Insulin Human Regular 15 unit/ 

Total Parenteral Nutrition/Amino Acids/Dextrose/ Fat Emulsion Intravenous 1,890 

ml @  78.75 mls/ hr TPN  CONT IV  Last administered on 5/17/20at 21:34;  Start 

5/17/20 at 22:00;  Stop 5/18/20 at 21:59;  Status DC


Lorazepam (Ativan Inj) 2 mg STK-MED ONCE .ROUTE ;  Start 5/17/20 at 14:58;  Stop

5/17/20 at 14:58;  Status DC


Metoprolol Tartrate (Lopressor Vial) 5 mg 1X  ONCE IVP  Last administered on 

5/17/20at 15:31;  Start 5/17/20 at 15:15;  Stop 5/17/20 at 15:16;  Status DC


Lorazepam (Ativan Inj) 2 mg 1X  ONCE IVP  Last administered on 5/17/20at 15:30; 

Start 5/17/20 at 15:15;  Stop 5/17/20 at 15:16;  Status DC


Enoxaparin Sodium (Lovenox 40mg Syringe) 40 mg Q24H SQ  Last administered on 

6/5/20at 17:44;  Start 5/17/20 at 17:00;  Stop 6/7/20 at 06:50;  Status DC


Lorazepam (Ativan Inj) 1 mg PRN Q4HRS  PRN IVP ANXIETY / AGITATION MILD-MOD Last

administered on 5/31/20at 15:55;  Start 5/17/20 at 19:15;  Stop 6/2/20 at 11:45;

 Status DC


Lorazepam (Ativan Inj) 2 mg PRN Q4HRS  PRN IVP ANXIETY / AGITATION SEVERE Last 

administered on 6/1/20at 07:55;  Start 5/17/20 at 19:15;  Stop 6/2/20 at 11:45; 

Status DC


Fentanyl Citrate (Fentanyl 2ml Vial) 50 mcg PRN Q4HRS  PRN IVP SEVERE PAIN Last 

administered on 6/13/20at 05:15;  Start 5/18/20 at 13:15;  Stop 6/14/20 at 

09:29;  Status DC


Fentanyl Citrate (Fentanyl 2ml Vial) 25 mcg PRN Q4HRS  PRN IVP MODERATE PAIN L

ast administered on 6/13/20at 00:27;  Start 5/18/20 at 13:15;  Stop 6/14/20 at 

09:30;  Status DC


Potassium Chloride 90 meq/ Magnesium Sulfate 20 meq/ Multivitamins 10 

ml/Chromium/ Copper/Manganese/ Seleni/Zn 1 ml/ Insulin Human Regular 15 unit/ 

Total Parenteral Nutrition/Amino Acids/Dextrose/ Fat Emulsion Intravenous 1,890 

ml @  78.75 mls/ hr TPN  CONT IV  Last administered on 5/18/20at 22:18;  Start 

5/18/20 at 22:00;  Stop 5/19/20 at 21:59;  Status DC


Furosemide (Lasix) 40 mg 1X  ONCE IVP  Last administered on 5/18/20at 21:51;  

Start 5/18/20 at 21:45;  Stop 5/18/20 at 21:48;  Status DC


Albumin Human 100 ml @  100 mls/hr 1X PRN  PRN IV SEE COMMENTS;  Start 5/19/20 

at 01:30


Furosemide (Lasix) 40 mg [id number] IVP  Last administered on 6/3/20at 08:04;  Start 

5/19/20 at 14:00;  Stop 6/3/20 at 13:07;  Status DC


Potassium Chloride 90 meq/ Magnesium Sulfate 20 meq/ Multivitamins 10 

ml/Chromium/ Copper/Manganese/ Seleni/Zn 1 ml/ Insulin Human Regular 15 unit/ 

Total Parenteral Nutrition/Amino Acids/Dextrose/ Fat Emulsion Intravenous 1,800 

ml @  75 mls/hr TPN  CONT IV  Last administered on 5/19/20at 22:31;  Start 

5/19/20 at 22:00;  Stop 5/20/20 at 21:59;  Status DC


Potassium Chloride 90 meq/ Magnesium Sulfate 20 meq/ Multivitamins 10 

ml/Chromium/ Copper/Manganese/ Seleni/Zn 1 ml/ Insulin Human Regular 15 unit/ 

Total Parenteral Nutrition/Amino Acids/Dextrose/ Fat Emulsion Intravenous 1,800 

ml @  75 mls/hr TPN  CONT IV  Last administered on 5/20/20at 22:28;  Start 

5/20/20 at 22:00;  Stop 5/21/20 at 21:59;  Status DC


Potassium Chloride 110 meq/ Magnesium Sulfate 20 meq/ Multivitamins 10 

ml/Chromium/ Copper/Manganese/ Seleni/Zn 1 ml/ Insulin Human Regular 15 unit/ 

Total Parenteral Nutrition/Amino Acids/Dextrose/ Fat Emulsion Intravenous 1,800 

ml @  75 mls/hr TPN  CONT IV  Last administered on 5/21/20at 22:01;  Start 

5/21/20 at 22:00;  Stop 5/22/20 at 21:59;  Status DC


Saliva Substitute (Biotene Moisturizing Mouth) 2 spray PRN Q15MIN  PRN PO DRY 

MOUTH;  Start 5/21/20 at 11:00


Potassium Chloride 110 meq/ Magnesium Sulfate 20 meq/ Multivitamins 10 

ml/Chromium/ Copper/Manganese/ Seleni/Zn 1 ml/ Insulin Human Regular 15 unit/ 

Total Parenteral Nutrition/Amino Acids/Dextrose/ Fat Emulsion Intravenous 1,800 

ml @  75 mls/hr TPN  CONT IV  Last administered on 5/22/20at 22:21;  Start 

5/22/20 at 22:00;  Stop 5/23/20 at 21:59;  Status DC


Potassium Chloride 110 meq/ Magnesium Sulfate 20 meq/ Multivitamins 10 

ml/Chromium/ Copper/Manganese/ Seleni/Zn 1 ml/ Insulin Human Regular 15 unit/ 

Total Parenteral Nutrition/Amino Acids/Dextrose/ Fat Emulsion Intravenous 1,800 

ml @  75 mls/hr TPN  CONT IV  Last administered on 5/23/20at 22:04;  Start 

5/23/20 at 22:00;  Stop 5/24/20 at 21:59;  Status DC


Potassium Chloride 110 meq/ Magnesium Sulfate 20 meq/ Multivitamins 10 

ml/Chromium/ Copper/Manganese/ Seleni/Zn 1 ml/ Insulin Human Regular 15 unit/ 

Total Parenteral Nutrition/Amino Acids/Dextrose/ Fat Emulsion Intravenous 1,800 

ml @  75 mls/hr TPN  CONT IV  Last administered on 5/24/20at 22:48;  Start 

5/24/20 at 22:00;  Stop 5/25/20 at 21:59;  Status DC


Potassium Chloride 70 meq/ Magnesium Sulfate 20 meq/ Multivitamins 10 

ml/Chromium/ Copper/Manganese/ Seleni/Zn 1 ml/ Insulin Human Regular 15 unit/ 

Total Parenteral Nutrition/Amino Acids/Dextrose/ Fat Emulsion Intravenous 1,800 

ml @  75 mls/hr TPN  CONT IV  Last administered on 5/25/20at 21:39;  Start 

5/25/20 at 22:00;  Stop 5/26/20 at 21:59;  Status DC


Meropenem 500 mg/ Sodium Chloride 50 ml @  100 mls/hr Q6HRS IV  Last 

administered on 5/27/20at 06:02;  Start 5/25/20 at 18:00;  Stop 5/27/20 at 

09:59;  Status DC


Barium Sulfate (Varibar Thin Liquid Apple) 148 gm 1X  ONCE PO ;  Start 5/26/20 

at 11:45;  Stop 5/26/20 at 11:49;  Status DC


Potassium Chloride 70 meq/ Magnesium Sulfate 20 meq/ Multivitamins 10 

ml/Chromium/ Copper/Manganese/ Seleni/Zn 1 ml/ Insulin Human Regular 15 unit/ 

Total Parenteral Nutrition/Amino Acids/Dextrose/ Fat Emulsion Intravenous 1,800 

ml @  75 mls/hr TPN  CONT IV  Last administered on 5/26/20at 22:27;  Start 

5/26/20 at 22:00;  Stop 5/27/20 at 21:59;  Status DC


Piperacillin Sod/ Tazobactam Sod 3.375 gm/Sodium Chloride 50 ml @  100 mls/hr 

Q6HRS IV  Last administered on 6/4/20at 06:10;  Start 5/27/20 at 12:00;  Stop 

6/4/20 at 07:26;  Status DC


Potassium Chloride 70 meq/ Magnesium Sulfate 20 meq/ Multivitamins 10 

ml/Chromium/ Copper/Manganese/ Seleni/Zn 1 ml/ Insulin Human Regular 15 unit/ 

Total Parenteral Nutrition/Amino Acids/Dextrose/ Fat Emulsion Intravenous 1,800 

ml @  75 mls/hr TPN  CONT IV  Last administered on 5/27/20at 22:03;  Start 

5/27/20 at 22:00;  Stop 5/28/20 at 21:59;  Status DC


Potassium Chloride 70 meq/ Magnesium Sulfate 20 meq/ Multivitamins 10 

ml/Chromium/ Copper/Manganese/ Seleni/Zn 1 ml/ Insulin Human Regular 15 unit/ 

Total Parenteral Nutrition/Amino Acids/Dextrose/ Fat Emulsion Intravenous 1,800 

ml @  75 mls/hr TPN  CONT IV  Last administered on 5/28/20at 22:33;  Start 

5/28/20 at 22:00;  Stop 5/29/20 at 21:59;  Status DC


Potassium Chloride 70 meq/ Magnesium Sulfate 20 meq/ Multivitamins 10 

ml/Chromium/ Copper/Manganese/ Seleni/Zn 1 ml/ Insulin Human Regular 15 unit/ 

Total Parenteral Nutrition/Amino Acids/Dextrose/ Fat Emulsion Intravenous 1,800 

ml @  75 mls/hr TPN  CONT IV  Last administered on 5/29/20at 23:13;  Start 

5/29/20 at 22:00;  Stop 5/30/20 at 21:59;  Status DC


Potassium Chloride 80 meq/ Magnesium Sulfate 20 meq/ Multivitamins 10 

ml/Chromium/ Copper/Manganese/ Seleni/Zn 1 ml/ Insulin Human Regular 15 unit/ 

Total Parenteral Nutrition/Amino Acids/Dextrose/ Fat Emulsion Intravenous 1,800 

ml @  75 mls/hr TPN  CONT IV  Last administered on 5/30/20at 22:30;  Start 

5/30/20 at 22:00;  Stop 5/31/20 at 21:59;  Status DC


Potassium Chloride 80 meq/ Magnesium Sulfate 20 meq/ Multivitamins 10 

ml/Chromium/ Copper/Manganese/ Seleni/Zn 1 ml/ Insulin Human Regular 15 unit/ 

Total Parenteral Nutrition/Amino Acids/Dextrose/ Fat Emulsion Intravenous 1,800 

ml @  75 mls/hr TPN  CONT IV  Last administered on 5/31/20at 21:54;  Start 

5/31/20 at 22:00;  Stop 6/1/20 at 21:59;  Status DC


Potassium Chloride/Water 100 ml @  100 mls/hr 1X  ONCE IV  Last administered on 

6/1/20at 10:15;  Start 6/1/20 at 10:00;  Stop 6/1/20 at 10:59;  Status DC


Potassium Chloride 90 meq/ Magnesium Sulfate 20 meq/ Multivitamins 10 

ml/Chromium/ Copper/Manganese/ Seleni/Zn 1 ml/ Insulin Human Regular 20 unit/ 

Total Parenteral Nutrition/Amino Acids/Dextrose/ Fat Emulsion Intravenous 1,800 

ml @  75 mls/hr TPN  CONT IV  Last administered on 6/1/20at 22:28;  Start 6/1/20

at 22:00;  Stop 6/2/20 at 21:59;  Status DC


Potassium Chloride 90 meq/ Magnesium Sulfate 20 meq/ Multivitamins 10 

ml/Chromium/ Copper/Manganese/ Seleni/Zn 1 ml/ Insulin Human Regular 20 unit/ 

Total Parenteral Nutrition/Amino Acids/Dextrose/ Fat Emulsion Intravenous 1,800 

ml @  75 mls/hr TPN  CONT IV  Last administered on 6/2/20at 22:08;  Start 6/2/20

at 22:00;  Stop 6/3/20 at 21:59;  Status DC


Lorazepam (Ativan Inj) 0.25 mg PRN Q4HRS  PRN IVP ANXIETY / AGITATION Last 

administered on 6/27/20at 13:37;  Start 6/3/20 at 07:30


Potassium Chloride 90 meq/ Magnesium Sulfate 20 meq/ Multivitamins 10 

ml/Chromium/ Copper/Manganese/ Seleni/Zn 1 ml/ Insulin Human Regular 20 unit/ 

Total Parenteral Nutrition/Amino Acids/Dextrose/ Fat Emulsion Intravenous 1,800 

ml @  75 mls/hr TPN  CONT IV  Last administered on 6/3/20at 23:13;  Start 6/3/20

at 22:00;  Stop 6/4/20 at 21:59;  Status DC


Furosemide (Lasix) 40 mg DAILY IVP  Last administered on 6/5/20at 11:14;  Start 

6/3/20 at 13:30;  Stop 6/7/20 at 09:12;  Status DC


Fluoxetine HCl (PROzac) 20 mg QHS PEG  Last administered on 7/6/20at 21:47;  

Start 6/4/20 at 21:00


Fentanyl (Duragesic 50mcg/ Hr Patch) 1 patch Q72H TD  Last administered on 

6/4/20at 21:22;  Start 6/4/20 at 21:00;  Stop 6/13/20 at 12:00;  Status DC


Potassium Chloride 40 meq/ Potassium Acetate 60 meq/Magnesium Sulfate 10 meq/ 

Multivitamins 10 ml/Chromium/ Copper/Manganese/ Seleni/Zn 1 ml/ Insulin Human 

Regular 20 unit/ Total Parenteral Nutrition/Amino Acids/Dextrose/ Fat Emulsion 

Intravenous 1,800 ml @  75 mls/hr TPN  CONT IV  Last administered on 6/5/20at 

00:03;  Start 6/4/20 at 22:00;  Stop 6/5/20 at 21:59;  Status DC


Potassium Acetate 80 meq/Magnesium Sulfate 5 meq/ Multivitamins 10 ml/Chromium/ 

Copper/Manganese/ Seleni/Zn 1 ml/ Insulin Human Regular 20 unit/ Total 

Parenteral Nutrition/Amino Acids/Dextrose/ Fat Emulsion Intravenous 1,920 ml @  

80 mls/hr TPN  CONT IV  Last administered on 6/5/20at 21:59;  Start 6/5/20 at 

22:00;  Stop 6/6/20 at 21:59;  Status DC


Potassium Acetate 60 meq/Magnesium Sulfate 5 meq/ Multivitamins 10 ml/Chromium/ 

Copper/Manganese/ Seleni/Zn 1 ml/ Insulin Human Regular 30 unit/ Total 

Parenteral Nutrition/Amino Acids/Dextrose/ Fat Emulsion Intravenous 1,920 ml @  

80 mls/hr TPN  CONT IV  Last administered on 6/6/20at 21:54;  Start 6/6/20 at 

22:00;  Stop 6/7/20 at 21:59;  Status DC


Norepinephrine Bitartrate 8 mg/ Dextrose 258 ml @  13.332 mls/ hr CONT  PRN IV 

PER PROTOCOL Last administered on 7/2/20at 09:09;  Start 6/7/20 at 06:30


Albumin Human 500 ml @  125 mls/hr 1X  ONCE IV  Last administered on 6/7/20at 

08:10;  Start 6/7/20 at 08:15;  Stop 6/7/20 at 12:14;  Status DC


Potassium Acetate 40 meq/Magnesium Sulfate 5 meq/ Multivitamins 10 ml/Chromium/ 

Copper/Manganese/ Seleni/Zn 1 ml/ Insulin Human Regular 30 unit/ Total 

Parenteral Nutrition/Amino Acids/Dextrose/ Fat Emulsion Intravenous 1,920 ml @  

80 mls/hr TPN  CONT IV  Last administered on 6/7/20at 22:23;  Start 6/7/20 at 

22:00;  Stop 6/8/20 at 21:59;  Status DC


Meropenem 1 gm/ Sodium Chloride 100 ml @  200 mls/hr Q8HRS IV ;  Start 6/7/20 at

14:00;  Status Cancel


Meropenem 1 gm/ Sodium Chloride 100 ml @  200 mls/hr Q8HRS IV  Last administered

on 6/7/20at 11:04;  Start 6/7/20 at 10:00;  Stop 6/7/20 at 13:00;  Status DC


Meropenem 1 gm/ Sodium Chloride 100 ml @  200 mls/hr Q12HR IV  Last administered

on 6/25/20at 08:27;  Start 6/7/20 at 21:00;  Stop 6/25/20 at 08:56;  Status DC


Sodium Chloride 1,000 ml @  1,000 mls/hr 1X  ONCE IV  Last administered on 

6/7/20at 11:06;  Start 6/7/20 at 10:45;  Stop 6/7/20 at 11:44;  Status DC


Micafungin Sodium 100 mg/Dextrose 100 ml @  100 mls/hr Q24H IV  Last ad

ministered on 6/24/20at 12:34;  Start 6/7/20 at 11:00;  Stop 6/25/20 at 08:56;  

Status DC


Daptomycin 410 mg/ Sodium Chloride 50 ml @  100 mls/hr Q24H IV  Last 

administered on 6/9/20at 13:33;  Start 6/7/20 at 14:00;  Stop 6/10/20 at 08:30; 

Status DC


Midazolam HCl (Versed) 2 mg STK-MED ONCE .ROUTE ;  Start 6/7/20 at 14:47;  Stop 

6/7/20 at 14:48;  Status DC


Fentanyl Citrate (Fentanyl 2ml Vial) 100 mcg STK-MED ONCE .ROUTE ;  Start 6/7/20

at 14:47;  Stop 6/7/20 at 14:48;  Status DC


Flumazenil (Romazicon) 0.5 mg STK-MED ONCE IV ;  Start 6/7/20 at 14:48;  Stop 

6/7/20 at 14:48;  Status DC


Naloxone HCl (Narcan) 0.4 mg STK-MED ONCE .ROUTE ;  Start 6/7/20 at 14:48;  Stop

6/7/20 at 14:48;  Status DC


Lidocaine HCl (Lidocaine 1% 20ml Vial) 20 ml STK-MED ONCE .ROUTE ;  Start 6/7/20

at 14:48;  Stop 6/7/20 at 14:48;  Status DC


Midazolam HCl (Versed) 2 mg 1X  ONCE IV  Last administered on 6/7/20at 15:28;  

Start 6/7/20 at 15:00;  Stop 6/7/20 at 15:01;  Status DC


Fentanyl Citrate (Fentanyl 2ml Vial) 100 mcg 1X  ONCE IV  Last administered on 

6/7/20at 15:28;  Start 6/7/20 at 15:00;  Stop 6/7/20 at 15:01;  Status DC


Lidocaine HCl (Lidocaine 1% 20ml Vial) 20 ml 1X  ONCE INJ  Last administered on 

6/7/20at 15:30;  Start 6/7/20 at 15:00;  Stop 6/7/20 at 15:01;  Status DC


Sodium Chloride 1,000 ml @  100 mls/hr Q10H IV  Last administered on 6/16/20at 

07:30;  Start 6/7/20 at 20:00;  Stop 6/16/20 at 11:26;  Status DC


Sodium Bicarbonate (Sodium Bicarb Adult 8.4% Syr) 50 meq 1X  ONCE IV  Last 

administered on 6/7/20at 21:47;  Start 6/7/20 at 22:00;  Stop 6/7/20 at 22:01;  

Status DC


Potassium Acetate 40 meq/Magnesium Sulfate 5 meq/ Multivitamins 10 ml/Chromium/ 

Copper/Manganese/ Seleni/Zn 1 ml/ Insulin Human Regular 30 unit/ Total 

Parenteral Nutrition/Amino Acids/Dextrose/ Fat Emulsion Intravenous 1,920 ml @  

80 mls/hr TPN  CONT IV  Last administered on 6/8/20at 22:28;  Start 6/8/20 at 

22:00;  Stop 6/9/20 at 21:59;  Status DC


Sodium Chloride 500 ml @  500 mls/hr 1X  ONCE IV  Last administered on 6/9/20at 

06:39;  Start 6/9/20 at 06:45;  Stop 6/9/20 at 07:44;  Status DC


Potassium Acetate 40 meq/Magnesium Sulfate 5 meq/ Multivitamins 10 ml/Chromium/ 

Copper/Manganese/ Seleni/Zn 1 ml/ Insulin Human Regular 30 unit/ Total 

Parenteral Nutrition/Amino Acids/Dextrose/ Fat Emulsion Intravenous 1,920 ml @  

80 mls/hr TPN  CONT IV  Last administered on 6/9/20at 22:03;  Start 6/9/20 at 

22:00;  Stop 6/10/20 at 21:59;  Status DC


Metoprolol Tartrate (Lopressor Vial) 5 mg PRN Q6HRS  PRN IVP HYPERTENSION Last 

administered on 6/18/20at 10:14;  Start 6/10/20 at 09:00


Potassium Acetate 40 meq/Magnesium Sulfate 5 meq/ Multivitamins 10 ml/Chromium/ 

Copper/Manganese/ Seleni/Zn 1 ml/ Insulin Human Regular 30 unit/ Total 

Parenteral Nutrition/Amino Acids/Dextrose/ Fat Emulsion Intravenous 1,920 ml @  

80 mls/hr TPN  CONT IV  Last administered on 6/10/20at 21:26;  Start 6/10/20 at 

22:00;  Stop 6/11/20 at 21:59;  Status DC


Potassium Acetate 40 meq/Magnesium Sulfate 5 meq/ Multivitamins 10 ml/Chromium/ 

Copper/Manganese/ Seleni/Zn 1 ml/ Insulin Human Regular 30 unit/ Total 

Parenteral Nutrition/Amino Acids/Dextrose/ Fat Emulsion Intravenous 1,920 ml @  

80 mls/hr TPN  CONT IV  Last administered on 6/11/20at 23:23;  Start 6/11/20 at 

22:00;  Stop 6/12/20 at 21:59;  Status DC


Potassium Acetate 40 meq/Magnesium Sulfate 5 meq/ Multivitamins 10 ml/Chromium/ 

Copper/Manganese/ Seleni/Zn 1 ml/ Insulin Human Regular 30 unit/ Total 

Parenteral Nutrition/Amino Acids/Dextrose/ Fat Emulsion Intravenous 1,920 ml @  

80 mls/hr TPN  CONT IV  Last administered on 6/12/20at 21:35;  Start 6/12/20 at 

22:00;  Stop 6/13/20 at 21:59;  Status DC


Furosemide (Lasix) 20 mg 1X  ONCE IVP  Last administered on 6/13/20at 06:26;  

Start 6/13/20 at 06:15;  Stop 6/13/20 at 06:16;  Status DC


Methylprednisolone Sodium Succinate (SOLU-Medrol 125MG VIAL) 125 mg 1X  ONCE IV 

Last administered on 6/13/20at 06:26;  Start 6/13/20 at 06:15;  Stop 6/13/20 at 

06:16;  Status DC


Albuterol/ Ipratropium (Duoneb) 3 ml Q4HRS NEB  Last administered on 7/7/20at 

03:58;  Start 6/13/20 at 08:00


Fentanyl Citrate 30 ml @ 0 mls/hr CONT  PRN IV SEE PROTOCOL Last administered on

7/4/20at 08:03;  Start 6/13/20 at 06:00;  Stop 7/4/20 at 12:42;  Status DC


Propofol 100 ml @ 0 mls/hr CONT  PRN IV SEE PROTOCOL Last administered on 

6/20/20at 23:50;  Start 6/13/20 at 06:00


Fentanyl Citrate (Fentanyl 2ml Vial) 25 mcg PRN Q1HR  PRN IV SEE COMMENTS;  

Start 6/13/20 at 06:00


Fentanyl Citrate (Fentanyl 2ml Vial) 50 mcg PRN Q1HR  PRN IV SEE COMMENTS;  

Start 6/13/20 at 06:00


Chlorhexidine Gluconate (Peridex) 15 ml BID MM ;  Start 6/13/20 at 09:00;  Stop 

6/13/20 at 07:58;  Status DC


Potassium Acetate 40 meq/Magnesium Sulfate 5 meq/ Multivitamins 10 ml/Chromium/ 

Copper/Manganese/ Seleni/Zn 1 ml/ Insulin Human Regular 30 unit/ Total 

Parenteral Nutrition/Amino Acids/Dextrose/ Fat Emulsion Intravenous 1,920 ml @  

80 mls/hr TPN  CONT IV  Last administered on 6/13/20at 21:19;  Start 6/13/20 at 

22:00;  Stop 6/14/20 at 21:59;  Status DC


Acetylcysteine (Mucomyst 20% Resp Treatment) 600 mg BID NEB  Last administered 

on 6/19/20at 09:33;  Start 6/13/20 at 21:00;  Stop 6/19/20 at 10:39;  Status DC


Magnesium Sulfate 100 ml @  25 mls/hr 1X  ONCE IV  Last administered on 

6/13/20at 15:48;  Start 6/13/20 at 15:45;  Stop 6/13/20 at 19:44;  Status DC


Potassium Acetate 40 meq/Magnesium Sulfate 5 meq/ Multivitamins 10 ml/Chromium/ 

Copper/Manganese/ Seleni/Zn 1 ml/ Insulin Human Regular 30 unit/ Total 

Parenteral Nutrition/Amino Acids/Dextrose/ Fat Emulsion Intravenous 1,920 ml @  

80 mls/hr TPN  CONT IV  Last administered on 6/14/20at 21:35;  Start 6/14/20 at 

22:00;  Stop 6/15/20 at 21:59;  Status DC


Potassium Chloride/Water 100 ml @  100 mls/hr Q1H IV  Last administered on 

6/15/20at 08:31;  Start 6/15/20 at 07:00;  Stop 6/15/20 at 08:59;  Status DC


Potassium Acetate 40 meq/Magnesium Sulfate 5 meq/ Multivitamins 10 ml/Chromium/ 

Copper/Manganese/ Seleni/Zn 1 ml/ Insulin Human Regular 30 unit/ Total 

Parenteral Nutrition/Amino Acids/Dextrose/ Fat Emulsion Intravenous 1,920 ml @  

80 mls/hr TPN  CONT IV  Last administered on 6/15/20at 21:54;  Start 6/15/20 at 

22:00;  Stop 6/16/20 at 19:34;  Status DC


Lidocaine HCl (Buffered Lidocaine 1%) 3 ml STK-MED ONCE .ROUTE ;  Start 6/15/20 

at 12:14;  Stop 6/15/20 at 12:14;  Status DC


Lidocaine HCl (Buffered Lidocaine 1%) 3 ml 1X  ONCE IJ  Last administered on 

6/15/20at 13:11;  Start 6/15/20 at 13:00;  Stop 6/15/20 at 13:01;  Status DC


Magnesium Sulfate 50 ml @ 25 mls/hr 1X  ONCE IV ;  Start 6/16/20 at 08:15;  Stop

6/16/20 at 10:14;  Status DC


Potassium Acetate 40 meq/Magnesium Sulfate 10 meq/ Multivitamins 10 ml/Chromium/

Copper/Manganese/ Seleni/Zn 1 ml/ Insulin Human Regular 20 unit/ Total 

Parenteral Nutrition/Amino Acids/Dextrose/ Fat Emulsion Intravenous 1,920 ml @  

80 mls/hr TPN  CONT IV  Last administered on 6/16/20at 21:32;  Start 6/16/20 at 

22:00;  Stop 6/17/20 at 21:59;  Status DC


Potassium Chloride/Water 100 ml @  100 mls/hr Q1H IV  Last administered on 

6/17/20at 09:12;  Start 6/17/20 at 08:00;  Stop 6/17/20 at 09:59;  Status DC


Alteplase, Recombinant (Cathflo For Central Catheter Clearance) 4 mg 1X  ONCE 

INT CAT ;  Start 6/17/20 at 09:15;  Stop 6/17/20 at 09:16;  Status UNV


Alteplase, Recombinant (Cathflo For Central Catheter Clearance) 4 mg 1X  ONCE 

INT CAT ;  Start 6/17/20 at 09:15;  Stop 6/17/20 at 09:16;  Status UNV


Alteplase, Recombinant (Cathflo For Central Catheter Clearance) 4 mg 1X  ONCE 

INT CAT ;  Start 6/17/20 at 09:15;  Stop 6/17/20 at 09:16;  Status UNV


Alteplase, Recombinant 4 mg/ Sodium Chloride 20 ml @ 20 mls/hr 1X  ONCE IV  Last

administered on 6/17/20at 10:10;  Start 6/17/20 at 10:00;  Stop 6/17/20 at 

10:59;  Status DC


Alteplase, Recombinant 4 mg/ Sodium Chloride 20 ml @ 20 mls/hr 1X  ONCE IV  Last

administered on 6/17/20at 10:09;  Start 6/17/20 at 10:00;  Stop 6/17/20 at 

10:59;  Status DC


Alteplase, Recombinant 4 mg/ Sodium Chloride 20 ml @ 20 mls/hr 1X  ONCE IV  Last

administered on 6/17/20at 10:09;  Start 6/17/20 at 10:00;  Stop 6/17/20 at 

10:59;  Status DC


Potassium Acetate 60 meq/Magnesium Sulfate 10 meq/ Multivitamins 10 ml/Chromium/

Copper/Manganese/ Seleni/Zn 1 ml/ Insulin Human Regular 20 unit/ Total 

Parenteral Nutrition/Amino Acids/Dextrose/ Fat Emulsion Intravenous 1,920 ml @  

80 mls/hr TPN  CONT IV  Last administered on 6/17/20at 21:55;  Start 6/17/20 at 

22:00;  Stop 6/18/20 at 21:59;  Status DC


Albumin Human 500 ml @  125 mls/hr 1X  ONCE IV  Last administered on 6/18/20at 

12:01;  Start 6/18/20 at 11:15;  Stop 6/18/20 at 15:14;  Status DC


Sodium Chloride 500 ml @  500 mls/hr 1X  ONCE IV  Last administered on 6/18/20at

13:50;  Start 6/18/20 at 11:15;  Stop 6/18/20 at 12:14;  Status DC


Potassium Acetate 60 meq/Magnesium Sulfate 14 meq/ Multivitamins 10 ml/Chromium/

Copper/Manganese/ Seleni/Zn 1 ml/ Insulin Human Regular 20 unit/ Total 

Parenteral Nutrition/Amino Acids/Dextrose/ Fat Emulsion Intravenous 1,920 ml @  

80 mls/hr TPN  CONT IV  Last administered on 6/18/20at 22:26;  Start 6/18/20 at 

22:00;  Stop 6/19/20 at 21:59;  Status DC


Ciprofloxacin/ Dextrose 200 ml @  200 mls/hr Q12HR IV  Last administered on 

6/25/20at 08:27;  Start 6/18/20 at 21:00;  Stop 6/25/20 at 08:56;  Status DC


Albumin Human 250 ml @  62.5 mls/hr 1X  ONCE IV  Last administered on 6/19/20at 

11:09;  Start 6/19/20 at 11:00;  Stop 6/19/20 at 14:59;  Status DC


Furosemide (Lasix) 20 mg 1X  ONCE IVP  Last administered on 6/19/20at 14:52;  

Start 6/19/20 at 10:45;  Stop 6/19/20 at 10:49;  Status DC


Potassium Acetate 60 meq/Magnesium Sulfate 14 meq/ Multivitamins 10 ml/Chromium/

Copper/Manganese/ Seleni/Zn 1 ml/ Insulin Human Regular 15 unit/ Total Parent

eral Nutrition/Amino Acids/Dextrose/ Fat Emulsion Intravenous 1,920 ml @  80 

mls/hr TPN  CONT IV  Last administered on 6/19/20at 22:08;  Start 6/19/20 at 

22:00;  Stop 6/20/20 at 21:59;  Status DC


Potassium Acetate 60 meq/Magnesium Sulfate 14 meq/ Multivitamins 10 ml/Chromium/

Copper/Manganese/ Seleni/Zn 1 ml/ Insulin Human Regular 15 unit/ Total 

Parenteral Nutrition/Amino Acids/Dextrose/ Fat Emulsion Intravenous 1,920 ml @  

80 mls/hr TPN  CONT IV  Last administered on 6/20/20at 22:12;  Start 6/20/20 at 

22:00;  Stop 6/21/20 at 21:59;  Status DC


Potassium Acetate 60 meq/Magnesium Sulfate 14 meq/ Multivitamins 10 ml/Chromium/

Copper/Manganese/ Seleni/Zn 1 ml/ Insulin Human Regular 15 unit/ Total 

Parenteral Nutrition/Amino Acids/Dextrose/ Fat Emulsion Intravenous 1,920 ml @  

80 mls/hr TPN  CONT IV  Last administered on 6/21/20at 22:22;  Start 6/21/20 at 

22:00;  Stop 6/22/20 at 21:59;  Status DC


Furosemide (Lasix) 20 mg 1X  ONCE IVP  Last administered on 6/22/20at 11:07;  

Start 6/22/20 at 10:30;  Stop 6/22/20 at 10:34;  Status DC


Potassium Acetate 60 meq/Magnesium Sulfate 14 meq/ Multivitamins 10 ml/Chromium/

Copper/Manganese/ Seleni/Zn 1 ml/ Insulin Human Regular 15 unit/ Sodium Chloride

20 meq/Total Parenteral Nutrition/Amino Acids/Dextrose/ Fat Emulsion Intravenous

1,920 ml @  80 mls/hr TPN  CONT IV  Last administered on 6/22/20at 21:54;  Start

6/22/20 at 22:00;  Stop 6/23/20 at 21:59;  Status DC


Potassium Acetate 30 meq/Magnesium Sulfate 14 meq/ Multivitamins 10 ml/Chromium/

Copper/Manganese/ Seleni/Zn 1 ml/ Insulin Human Regular 15 unit/ Sodium Chloride

20 meq/Potassium Chloride 30 meq/ Total Parenteral Nutrition/Amino 

Acids/Dextrose/ Fat Emulsion Intravenous 1,920 ml @  80 mls/hr TPN  CONT IV  

Last administered on 6/23/20at 21:46;  Start 6/23/20 at 22:00;  Stop 6/24/20 at 

21:59;  Status DC


Sodium Chloride 80 meq/Potassium Chloride 30 meq/ Potassium Acetate 30 

meq/Magnesium Sulfate 14 meq/ Multivitamins 10 ml/Chromium/ Copper/Manganese/ 

Seleni/Zn 1 ml/ Insulin Human Regular 15 unit/ Total Parenteral Nutrition/Amino 

Acids/Dextrose/ Fat Emulsion Intravenous 1,920 ml @  80 mls/hr TPN  CONT IV  

Last administered on 6/24/20at 22:33;  Start 6/24/20 at 22:00;  Stop 6/25/20 at 

21:59;  Status DC


Furosemide (Lasix) 40 mg 1X  ONCE IVP  Last administered on 6/24/20at 16:27;  

Start 6/24/20 at 15:30;  Stop 6/24/20 at 15:33;  Status DC


Albumin Human 250 ml @  62.5 mls/hr 1X  ONCE IV  Last administered on 6/24/20at 

16:27;  Start 6/24/20 at 15:30;  Stop 6/24/20 at 19:29;  Status DC


Sodium Chloride 80 meq/Potassium Chloride 30 meq/ Potassium Acetate 30 

meq/Magnesium Sulfate 14 meq/ Multivitamins 10 ml/Chromium/ Copper/Manganese/ 

Seleni/Zn 1 ml/ Insulin Human Regular 15 unit/ Total Parenteral Nutrition/Amino 

Acids/Dextrose/ Fat Emulsion Intravenous 1,920 ml @  80 mls/hr TPN  CONT IV  

Last administered on 6/25/20at 22:25;  Start 6/25/20 at 22:00;  Stop 6/26/20 at 

21:59;  Status DC


Sodium Chloride 80 meq/Potassium Chloride 30 meq/ Potassium Acetate 30 

meq/Magnesium Sulfate 14 meq/ Multivitamins 10 ml/Chromium/ Copper/Manganese/ 

Seleni/Zn 1 ml/ Insulin Human Regular 15 unit/ Total Parenteral Nutrition/Amino 

Acids/Dextrose/ Fat Emulsion Intravenous 1,920 ml @  80 mls/hr TPN  CONT IV  

Last administered on 6/26/20at 21:32;  Start 6/26/20 at 22:00;  Stop 6/27/20 at 

21:59;  Status DC


Sodium Chloride 80 meq/Potassium Chloride 30 meq/ Potassium Acetate 30 

meq/Magnesium Sulfate 14 meq/ Multivitamins 10 ml/Chromium/ Copper/Manganese/ 

Seleni/Zn 1 ml/ Insulin Human Regular 15 unit/ Total Parenteral Nutrition/Amino 

Acids/Dextrose/ Fat Emulsion Intravenous 1,920 ml @  80 mls/hr TPN  CONT IV  

Last administered on 6/27/20at 21:53;  Start 6/27/20 at 22:00;  Stop 6/28/20 at 

21:59;  Status DC


Acetylcysteine (Mucomyst 20% Resp Treatment) 600 mg RTBID NEB  Last administered

on 7/6/20at 19:52;  Start 6/27/20 at 12:00


Sodium Chloride 80 meq/Potassium Chloride 30 meq/ Potassium Acetate 30 

meq/Magnesium Sulfate 14 meq/ Multivitamins 10 ml/Chromium/ Copper/Manganese/ 

Seleni/Zn 1 ml/ Insulin Human Regular 15 unit/ Total Parenteral Nutrition/Amino 

Acids/Dextrose/ Fat Emulsion Intravenous 1,920 ml @  80 mls/hr TPN  CONT IV  

Last administered on 6/28/20at 22:06;  Start 6/28/20 at 22:00;  Stop 6/29/20 at 

21:59;  Status DC


Meropenem 500 mg/ Sodium Chloride 50 ml @  100 mls/hr Q6HRS IV  Last 

administered on 7/7/20at 05:34;  Start 6/28/20 at 18:00


Daptomycin 500 mg/ Sodium Chloride 50 ml @  100 mls/hr Q24H IV  Last 

administered on 7/6/20at 21:47;  Start 6/28/20 at 19:00;  Stop 7/7/20 at 08:13; 

Status DC


Sodium Chloride 80 meq/Potassium Chloride 30 meq/ Potassium Acetate 30 me

q/Magnesium Sulfate 14 meq/ Multivitamins 10 ml/Chromium/ Copper/Manganese/ 

Seleni/Zn 1 ml/ Insulin Human Regular 15 unit/ Total Parenteral Nutrition/Amino 

Acids/Dextrose/ Fat Emulsion Intravenous 1,920 ml @  80 mls/hr TPN  CONT IV  

Last administered on 6/29/20at 22:09;  Start 6/29/20 at 22:00;  Stop 6/30/20 at 

21:59;  Status DC


Heparin Sodium (Porcine) 1000 unit/Sodium Chloride 1,001 ml @  1,001 mls/hr 1X  

ONCE IRR ;  Start 6/30/20 at 06:00;  Stop 6/30/20 at 06:59;  Status DC


Propofol (Diprivan) 200 mg STK-MED ONCE IV ;  Start 6/30/20 at 07:44;  Stop 

6/30/20 at 07:44;  Status DC


Lidocaine HCl (Lidocaine Pf 2% Vial) 5 ml STK-MED ONCE .ROUTE ;  Start 6/30/20 

at 07:44;  Stop 6/30/20 at 07:44;  Status DC


Fentanyl Citrate (Fentanyl 2ml Vial) 100 mcg STK-MED ONCE .ROUTE ;  Start 

6/30/20 at 07:44;  Stop 6/30/20 at 07:44;  Status DC


Rocuronium Bromide (Zemuron) 100 mg STK-MED ONCE .ROUTE ;  Start 6/30/20 at 

07:44;  Stop 6/30/20 at 07:44;  Status DC


Micafungin Sodium 100 mg/Dextrose 100 ml @  100 mls/hr Q24H IV  Last 

administered on 7/7/20at 08:33;  Start 6/30/20 at 08:30


Bupivacaine HCl/ Epinephrine Bitart (Sensorcain-Epi 0.5%-1:[id number] Mpf) 30 ml 

STK-MED ONCE .ROUTE ;  Start 6/30/20 at 08:34;  Stop 6/30/20 at 08:35;  Status 

DC


Iohexol (Omnipaque 300 Mg/ml) 50 ml STK-MED ONCE .ROUTE  Last administered on 

6/30/20at 13:30;  Start 6/30/20 at 08:35;  Stop 6/30/20 at 08:35;  Status DC


Sodium Chloride 80 meq/Potassium Chloride 30 meq/ Potassium Acetate 30 

meq/Magnesium Sulfate 14 meq/ Multivitamins 10 ml/Chromium/ Copper/Manganese/ 

Seleni/Zn 1 ml/ Insulin Human Regular 15 unit/ Total Parenteral Nutrition/Amino 

Acids/Dextrose/ Fat Emulsion Intravenous 1,920 ml @  80 mls/hr TPN  CONT IV  

Last administered on 7/1/20at 01:22;  Start 6/30/20 at 22:00;  Stop 7/1/20 at 

21:59;  Status DC


Phenylephrine HCl (Ken-Synephrine Inj) 10 mg STK-MED ONCE .ROUTE ;  Start 

6/30/20 at 10:15;  Stop 6/30/20 at 10:15;  Status DC


Desflurane (Suprane) 90 ml STK-MED ONCE IH ;  Start 6/30/20 at 10:18;  Stop 

6/30/20 at 10:19;  Status DC


Albumin Human 500 ml @  As Directed STK-MED ONCE IV ;  Start 6/30/20 at 11:06;  

Stop 6/30/20 at 11:06;  Status DC


Vasopressin (Vasostrict) 20 unit STK-MED ONCE .ROUTE ;  Start 6/30/20 at 12:23; 

Stop 6/30/20 at 12:23;  Status DC


Phenylephrine HCl (Ken-Synephrine Inj) 10 mg STK-MED ONCE .ROUTE ;  Start 

6/30/20 at 13:33;  Stop 6/30/20 at 13:33;  Status DC


Phenylephrine HCl (Ken-Synephrine Inj) 10 mg STK-MED ONCE .ROUTE ;  Start 

6/30/20 at 13:33;  Stop 6/30/20 at 13:33;  Status DC


Ondansetron HCl (Zofran) 4 mg STK-MED ONCE .ROUTE ;  Start 6/30/20 at 13:33;  

Stop 6/30/20 at 13:33;  Status DC


Enoxaparin Sodium (Lovenox 40mg Syringe) 40 mg Q24H SQ  Last administered on 

7/7/20at 08:33;  Start 7/1/20 at 08:00


Sodium Chloride (Normal Saline Flush) 3 ml QSHIFT  PRN IV AFTER MEDS AND BLOOD 

DRAWS;  Start 6/30/20 at 14:45


Naloxone HCl (Narcan) 0.4 mg PRN Q2MIN  PRN IV SEE INSTRUCTIONS;  Start 6/30/20 

at 14:45


Sodium Chloride 1,000 ml @  25 mls/hr Q24H IV  Last administered on 7/4/20at 

12:37;  Start 6/30/20 at 14:33


Morphine Sulfate (Morphine Sulfate) 1 mg PRN Q1HR  PRN IV PAIN;  Start 6/30/20 

at 14:45


Midazolam HCl 100 mg/Sodium Chloride 100 ml @ 1 mls/hr CONT  PRN IV SEE I/O 

RECORD Last administered on 7/3/20at 18:48;  Start 6/30/20 at 14:45


Phenylephrine HCl (PHENYLEPHRINE in 0.9% NACL PF) 1 mg STK-MED ONCE IV ;  Start 

6/30/20 at 14:44;  Stop 6/30/20 at 14:45;  Status DC


Ephedrine Sulfate (ePHEDrine PF IN SALINE SYRINGE) 50 mg STK-MED ONCE IV ;  

Start 6/30/20 at 14:45;  Stop 6/30/20 at 14:45;  Status DC


Vasopressin 20 unit/Dextrose 101 ml @  12 mls/hr CONT  PRN IV SEE I/O RECORD 

Last administered on 7/7/20at 04:17;  Start 6/30/20 at 15:30


Sodium Chloride 1,000 ml @  1,000 mls/hr 1X  ONCE IV  Last administered on 

6/30/20at 15:42;  Start 6/30/20 at 15:45;  Stop 6/30/20 at 16:44;  Status DC


Albumin Human 500 ml @  125 mls/hr 1X  ONCE IV ;  Start 6/30/20 at 16:00;  Stop 

6/30/20 at 19:59;  Status DC


Albumin Human 500 ml @  125 mls/hr PRN Q1HR  PRN IV PER PROTOCOL;  Start 6/30/20

at 15:45


Magnesium Sulfate 50 ml @ 25 mls/hr 1X  ONCE IV  Last administered on 6/30/20at 

17:02;  Start 6/30/20 at 16:30;  Stop 6/30/20 at 18:29;  Status DC


Sodium Bicarbonate (Sodium Bicarb Adult 8.4% Syr) 50 meq STK-MED ONCE .ROUTE ;  

Start 6/30/20 at 16:20;  Stop 6/30/20 at 16:20;  Status DC


Sodium Bicarbonate (Sodium Bicarb Adult 8.4% Syr) 100 meq 1X  ONCE IV  Last 

administered on 6/30/20at 17:07;  Start 6/30/20 at 16:30;  Stop 6/30/20 at 

16:31;  Status DC


Sodium Bicarbonate 150 meq/Dextrose 1,150 ml @  75 mls/hr 1X  ONCE IV  Last 

administered on 6/30/20at 20:02;  Start 6/30/20 at 16:30;  Stop 7/1/20 at 07:49;

 Status DC


Sodium Chloride 80 meq/Potassium Chloride 30 meq/ Potassium Acetate 30 meq/Ma

gnesium Sulfate 14 meq/ Multivitamins 10 ml/Chromium/ Copper/Manganese/ 

Seleni/Zn 1 ml/ Insulin Human Regular 15 unit/ Total Parenteral Nutrition/Amino 

Acids/Dextrose/ Fat Emulsion Intravenous 1,920 ml @  80 mls/hr TPN  CONT IV  

Last administered on 7/1/20at 23:05;  Start 7/1/20 at 22:00;  Stop 7/2/20 at 

21:59;  Status DC


Sodium Chloride 100 meq/Potassium Chloride 30 meq/ Potassium Acetate 30 

meq/Magnesium Sulfate 12 meq/ Multivitamins 10 ml/Chromium/ Copper/Manganese/ 

Seleni/Zn 1 ml/ Insulin Human Regular 15 unit/ Total Parenteral Nutrition/Amino 

Acids/Dextrose/ Fat Emulsion Intravenous 1,920 ml @  80 mls/hr TPN  CONT IV  

Last administered on 7/2/20at 21:52;  Start 7/2/20 at 22:00;  Stop 7/3/20 at 

21:59;  Status DC


Sodium Chloride 100 meq/Potassium Chloride 30 meq/ Potassium Acetate 30 

meq/Magnesium Sulfate 12 meq/ Multivitamins 10 ml/Chromium/ Copper/Manganese/ 

Seleni/Zn 1 ml/ Insulin Human Regular 15 unit/ Total Parenteral Nutrition/Amino 

Acids/Dextrose/ Fat Emulsion Intravenous 1,920 ml @  80 mls/hr TPN  CONT IV  

Last administered on 7/3/20at 21:46;  Start 7/3/20 at 22:00;  Stop 7/4/20 at 

21:59;  Status DC


Sodium Chloride 100 meq/Potassium Chloride 30 meq/ Potassium Acetate 30 

meq/Magnesium Sulfate 12 meq/ Multivitamins 10 ml/Chromium/ Copper/Manganese/ Se

aram/Zn 1 ml/ Insulin Human Regular 15 unit/ Total Parenteral Nutrition/Amino 

Acids/Dextrose/ Fat Emulsion Intravenous 1,800 ml @  75 mls/hr TPN  CONT IV  

Last administered on 7/4/20at 22:04;  Start 7/4/20 at 22:00;  Stop 7/5/20 at 

21:59;  Status DC


Fentanyl Citrate 55 ml @ 0 mls/hr CONT  PRN IV SEE COMMENTS Last administered on

7/6/20at 23:55;  Start 7/4/20 at 13:00


Sodium Chloride 100 meq/Potassium Chloride 30 meq/ Potassium Acetate 30 

meq/Magnesium Sulfate 12 meq/ Multivitamins 10 ml/Chromium/ Copper/Manganese/ 

Seleni/Zn 1 ml/ Insulin Human Regular 15 unit/ Total Parenteral Nutrition/Amino 

Acids/Dextrose/ Fat Emulsion Intravenous 1,680 ml @  70 mls/hr TPN  CONT IV  

Last administered on 7/5/20at 21:23;  Start 7/5/20 at 22:00;  Stop 7/6/20 at 

21:59;  Status DC


Sodium Chloride 110 meq/Potassium Chloride 30 meq/ Potassium Acetate 30 

meq/Magnesium Sulfate 15 meq/ Multivitamins 10 ml/Chromium/ Copper/Manganese/ 

Seleni/Zn 1 ml/ Insulin Human Regular 15 unit/ Total Parenteral Nutrition/Amino 

Acids/Dextrose/ Fat Emulsion Intravenous 1,680 ml @  70 mls/hr TPN  CONT IV  

Last administered on 7/6/20at 21:48;  Start 7/6/20 at 22:00;  Stop 7/7/20 at 

21:59





Active Scripts


Active


Reported


Bisoprolol Fumarate 5 Mg Tablet 10 Mg PO DAILY


Vitals/I & O





Vital Sign - Last 24 Hours








 7/6/20 7/6/20 7/6/20 7/6/20





 09:00 10:00 11:00 11:34


 


Pulse 80 74 74 


 


Resp 22 22 22 


 


B/P (MAP) 133/83 (100) 124/72 (89) 136/81 (99) 


 


Pulse Ox 100 100 100 99


 


O2 Delivery Ventilator Ventilator Ventilator Ventilator





 7/6/20 7/6/20 7/6/20 7/6/20





 12:00 12:00 13:00 13:35


 


Temp  98.0  





  98.0  


 


Pulse  88 82 


 


Resp  22 22 


 


B/P (MAP)  135/83 (100) 108/68 (81) 


 


Pulse Ox  100 100 99


 


O2 Delivery Mechanical Ventilator Ventilator Ventilator Ventilator


 


    





    





 7/6/20 7/6/20 7/6/20 7/6/20





 14:00 15:00 15:28 16:00


 


Temp    98.4





    98.4


 


Pulse 82 78  100


 


Resp 22 22  22


 


B/P (MAP) 118/67 (84) 121/70 (87)  124/76 (92)


 


Pulse Ox 100 100 99 100


 


O2 Delivery Ventilator Ventilator Ventilator Ventilator


 


    





    





 7/6/20 7/6/20 7/6/20 7/6/20





 16:00 17:00 17:24 18:00


 


Pulse  84  82


 


Resp  22  22


 


B/P (MAP)  104/52 (69)  106/58 (74)


 


Pulse Ox  100 99 100


 


O2 Delivery Mechanical Ventilator Ventilator Ventilator Ventilator





 7/6/20 7/6/20 7/6/20 7/6/20





 19:00 19:54 19:58 20:00


 


Temp    98.0





    98.0


 


Pulse 78   80


 


Resp 14   20


 


B/P (MAP) 96/63 (74)   111/70 (84)


 


Pulse Ox 100 99 100 100


 


O2 Delivery Ventilator Ventilator Ventilator Ventilator


 


    





    





 7/6/20 7/6/20 7/6/20 7/6/20





 20:00 21:00 22:00 23:00


 


Pulse  92 89 88


 


Resp  22 25 16


 


B/P (MAP)  165/97 (119) 149/84 (105) 156/100 (118)


 


Pulse Ox  100 100 100


 


O2 Delivery Mechanical Ventilator Ventilator Ventilator Ventilator





 7/6/20 7/6/20 7/7/20 7/7/20





 23:51 23:55 00:00 00:01


 


Temp    100.1





    100.1


 


Pulse    121


 


Resp  25  23


 


B/P (MAP)    141/77 (98)


 


Pulse Ox 100 100  100


 


O2 Delivery Ventilator  Mechanical Ventilator Ventilator


 


O2 Flow Rate  40.0  


 


    





    





 7/7/20 7/7/20 7/7/20 7/7/20





 00:25 01:00 02:00 03:00


 


Pulse  114 80 110


 


Resp 15 14 12 16


 


B/P (MAP)  98/51 (67) 93/51 (65) 129/83 (98)


 


Pulse Ox 100 100 99 95


 


O2 Delivery Ventilator Ventilator Ventilator Ventilator


 


O2 Flow Rate 40.0   





 7/7/20 7/7/20 7/7/20 7/7/20





 03:48 04:00 04:00 05:00


 


Temp   98.1 





   98.1 


 


Pulse   96 97


 


Resp   19 16


 


B/P (MAP)   132/68 (89) 109/73 (85)


 


Pulse Ox 100  95 100


 


O2 Delivery Ventilator Mechanical Ventilator Ventilator Ventilator


 


    





    





 7/7/20 7/7/20  





 06:00 07:00  


 


Pulse 79 91  


 


Resp 24 19  


 


B/P (MAP) 134/92 (106) 144/99 (114)  


 


Pulse Ox 100 100  


 


O2 Delivery Ventilator Ventilator  














Intake and Output   


 


 7/6/20 7/6/20 7/7/20





 15:00 23:00 07:00


 


Intake Total 300 ml 1743 ml 1430.4 ml


 


Output Total 560 ml 1405 ml 1380 ml


 


Balance -260 ml 338 ml 50.4 ml











Justicifation of Admission Dx:


Justifications for Admission:


Justification of Admission Dx:  Yes











MG ARGUETA MD                  Jul 7, 2020 08:48

## 2020-07-07 NOTE — PDOC
PULMONARY PROGRESS NOTES


Subjective


On assist control ventilation sedated


Vitals





Vital Signs








  Date Time  Temp Pulse Resp B/P (MAP) Pulse Ox O2 Delivery O2 Flow Rate FiO2


 


7/7/20 07:00  91 19 144/99 (114) 100 Ventilator  


 


7/7/20 04:00 98.1       





 98.1       


 


7/7/20 00:25       40.0 








Comments


trach/sedated on vent


Lungs:  Crackles


Cardiovascular:  S1, S2


Abdomen:  Soft, Non-tender, Other (multiple ROBERT drains )


Extremities:  Other (+1 BLE edema)


Skin:  Warm


Labs





Laboratory Tests








Test


 7/5/20


12:49 7/5/20


18:06 7/5/20


23:53 7/6/20


06:15


 


Glucose (Fingerstick)


 169 mg/dL


(70-99) 132 mg/dL


(70-99) 147 mg/dL


(70-99) 





 


White Blood Count


 


 


 


 18.0 x10^3/uL


(4.0-11.0)


 


Red Blood Count


 


 


 


 2.62 x10^6/uL


(3.50-5.40)


 


Hemoglobin


 


 


 


 7.7 g/dL


(12.0-15.5)


 


Hematocrit


 


 


 


 23.4 %


(36.0-47.0)


 


Mean Corpuscular Volume    89 fL () 


 


Mean Corpuscular Hemoglobin    30 pg (25-35) 


 


Mean Corpuscular Hemoglobin


Concent 


 


 


 33 g/dL


(31-37)


 


Red Cell Distribution Width


 


 


 


 14.5 %


(11.5-14.5)


 


Platelet Count


 


 


 


 375 x10^3/uL


(140-400)


 


Neutrophils (%) (Auto)    86 % (31-73) 


 


Lymphocytes (%) (Auto)    7 % (24-48) 


 


Monocytes (%) (Auto)    6 % (0-9) 


 


Eosinophils (%) (Auto)    1 % (0-3) 


 


Basophils (%) (Auto)    0 % (0-3) 


 


Neutrophils # (Auto)


 


 


 


 15.5 x10^3/uL


(1.8-7.7)


 


Lymphocytes # (Auto)


 


 


 


 1.2 x10^3/uL


(1.0-4.8)


 


Monocytes # (Auto)


 


 


 


 1.0 x10^3/uL


(0.0-1.1)


 


Eosinophils # (Auto)


 


 


 


 0.2 x10^3/uL


(0.0-0.7)


 


Basophils # (Auto)


 


 


 


 0.0 x10^3/uL


(0.0-0.2)


 


Sodium Level


 


 


 


 135 mmol/L


(136-145)


 


Potassium Level


 


 


 


 4.5 mmol/L


(3.5-5.1)


 


Chloride Level


 


 


 


 103 mmol/L


()


 


Carbon Dioxide Level


 


 


 


 29 mmol/L


(21-32)


 


Anion Gap    3 (6-14) 


 


Blood Urea Nitrogen


 


 


 


 19 mg/dL


(7-20)


 


Creatinine


 


 


 


 0.5 mg/dL


(0.6-1.0)


 


Estimated GFR


(Cockcroft-Gault) 


 


 


 131.1 





 


Glucose Level


 


 


 


 155 mg/dL


(70-99)


 


Calcium Level


 


 


 


 9.2 mg/dL


(8.5-10.1)


 


Phosphorus Level


 


 


 


 3.4 mg/dL


(2.6-4.7)


 


Magnesium Level


 


 


 


 1.8 mg/dL


(1.8-2.4)


 


Triglycerides Level


 


 


 


 207 mg/dL


(0-150)


 


Test


 7/6/20


06:21 7/6/20


11:56 7/6/20


16:51 7/7/20


00:00


 


Glucose (Fingerstick)


 151 mg/dL


(70-99) 154 mg/dL


(70-99) 153 mg/dL


(70-99) 123 mg/dL


(70-99)


 


Test


 7/7/20


05:20 7/7/20


05:32 


 





 


White Blood Count


 15.0 x10^3/uL


(4.0-11.0) 


 


 





 


Red Blood Count


 2.54 x10^6/uL


(3.50-5.40) 


 


 





 


Hemoglobin


 7.5 g/dL


(12.0-15.5) 


 


 





 


Hematocrit


 22.5 %


(36.0-47.0) 


 


 





 


Mean Corpuscular Volume 89 fL ()    


 


Mean Corpuscular Hemoglobin 30 pg (25-35)    


 


Mean Corpuscular Hemoglobin


Concent 33 g/dL


(31-37) 


 


 





 


Red Cell Distribution Width


 14.8 %


(11.5-14.5) 


 


 





 


Platelet Count


 414 x10^3/uL


(140-400) 


 


 





 


Neutrophils (%) (Auto) 86 % (31-73)    


 


Lymphocytes (%) (Auto) 7 % (24-48)    


 


Monocytes (%) (Auto) 6 % (0-9)    


 


Eosinophils (%) (Auto) 1 % (0-3)    


 


Basophils (%) (Auto) 0 % (0-3)    


 


Neutrophils # (Auto)


 12.9 x10^3/uL


(1.8-7.7) 


 


 





 


Lymphocytes # (Auto)


 1.0 x10^3/uL


(1.0-4.8) 


 


 





 


Monocytes # (Auto)


 0.9 x10^3/uL


(0.0-1.1) 


 


 





 


Eosinophils # (Auto)


 0.2 x10^3/uL


(0.0-0.7) 


 


 





 


Basophils # (Auto)


 0.0 x10^3/uL


(0.0-0.2) 


 


 





 


Glucose (Fingerstick)


 


 159 mg/dL


(70-99) 


 











Laboratory Tests








Test


 7/6/20


11:56 7/6/20


16:51 7/7/20


00:00 7/7/20


05:20


 


Glucose (Fingerstick)


 154 mg/dL


(70-99) 153 mg/dL


(70-99) 123 mg/dL


(70-99) 





 


White Blood Count


 


 


 


 15.0 x10^3/uL


(4.0-11.0)


 


Red Blood Count


 


 


 


 2.54 x10^6/uL


(3.50-5.40)


 


Hemoglobin


 


 


 


 7.5 g/dL


(12.0-15.5)


 


Hematocrit


 


 


 


 22.5 %


(36.0-47.0)


 


Mean Corpuscular Volume    89 fL () 


 


Mean Corpuscular Hemoglobin    30 pg (25-35) 


 


Mean Corpuscular Hemoglobin


Concent 


 


 


 33 g/dL


(31-37)


 


Red Cell Distribution Width


 


 


 


 14.8 %


(11.5-14.5)


 


Platelet Count


 


 


 


 414 x10^3/uL


(140-400)


 


Neutrophils (%) (Auto)    86 % (31-73) 


 


Lymphocytes (%) (Auto)    7 % (24-48) 


 


Monocytes (%) (Auto)    6 % (0-9) 


 


Eosinophils (%) (Auto)    1 % (0-3) 


 


Basophils (%) (Auto)    0 % (0-3) 


 


Neutrophils # (Auto)


 


 


 


 12.9 x10^3/uL


(1.8-7.7)


 


Lymphocytes # (Auto)


 


 


 


 1.0 x10^3/uL


(1.0-4.8)


 


Monocytes # (Auto)


 


 


 


 0.9 x10^3/uL


(0.0-1.1)


 


Eosinophils # (Auto)


 


 


 


 0.2 x10^3/uL


(0.0-0.7)


 


Basophils # (Auto)


 


 


 


 0.0 x10^3/uL


(0.0-0.2)


 


Test


 7/7/20


05:32 


 


 





 


Glucose (Fingerstick)


 159 mg/dL


(70-99) 


 


 











Medications





Active Scripts








 Medications  Dose


 Route/Sig


 Max Daily Dose Days Date Category


 


 Bisoprolol


 Fumarate 5 Mg


 Tablet  10 Mg


 PO DAILY


   3/16/20 Reported








Comments


 CXR 6/28/20


IMPRESSION:


No significant interval change compared to 6/25/2020.


 











ct abdomen /pelvis 6/6


1. Removal of the percutaneous pigtail drainage catheters since the prior 


exam. Sequela of pancreatitis with extensive pseudocysts again 


demonstrated, the right-sided collections are slightly larger since the 


prior exam, the left-sided collections are stable. See above.


2. Moderate to large left pleural effusion with atelectasis and collapse 


of most of the left lower lobe, stable. Small right pleural effusion is 


stable.


3. Gallstone.





ct chest 6/15 reviewed








 GRAM NEG COCCOBACILLI:MANY


        SQUAMOUS EPI CELL:RARE


        PMN (WBCs):FEW


        Unless otherwise specified, Testing Performed by:


        [address] [address]


        For Inquires, the Physician may contact the Microbiology


        department at [phone]





  RESPIRATORY CULTURE  Final  


        Final





        MANY GRAM NEGATIVE RODS on 06/15/20 at 1107


        FINAL ID= [PSEUDOMONAS AERUGINOSA]


        MICRO CHARGES


        PSEUDOMONAS AERUGINOSA





  ANTIMICROBIAL SUSCEPTIBILITY  Final  


        Comment





        NEG ANSON 56


        PSEUDOMONAS AERUGINOSA


        ANTIBIOTIC                        RESULT          INTERPRETATION


        AMIKACIN                          <=16                  S


        AZTREONAM                         <=4                   S


        CEFTAZIDIME                       <=1                   S


        CIPROFLOXACIN                     <=0.25                S


        CEFEPIME                          <=2                   S


        CEFTAZIDIME/AVIBACTAM             <=4                   S


        GENTAMICIN                        <=2                   S


        LEVOFLOXACIN                      <=0.5                 S





Impression


.





IMPRESSION:


1.  Acute hypoxemic respiratory failure secondary to ARDS status post trach, 

developed anemia 6/7, blood drainage from RLQ abdomen drain site, and 

surrounding firmness  / developed septic shock 6/7 from abdomen source, required

levo 6/7


s/p 3 new drains 6/7 with brown color drainage,  


2.  Gallstone pancreatitis, now with ongoing bleeding from prior drain. Anemic. 

s/p Tx multiple units over several days


3.  septic shock/sepsis, recurrent 6/7, source abdomen. ,


4.  Acute kidney injury-, Off HD--renal function decling. suspect JED on CKD due

to hypotension , improved now


5.  Acute gallstone pancreatitis.


6.  Hypoalbuminemia.


7.  Moderate persistent effusions, s/p left thora  5/12, reaccumulation of left 

effusion. O2 requirement not changed. 


8.  Fever- ,hypotension. suspect recurrent sepsis/ likely pancreatic source.  

Per ID, per surgery--


9.  Chronic anemia-- ongoing / s/p PRBC


10. Covid 19 testing negative


11. Moderate to large ascites-S/P paracentisis


12.S/P paracentisis with 4 liters removed on 4/15/20


13. S/P IR drain placement on 5/8/2020, removal, re inserted 6/7


14. Depression/Anxiety 


15. Increase effusion, ? loculated/ s/p chest tube.. drainage slowing down. 





6/30


S/P Exploratory laparotomy, lysis of adhesions, subtotal cholecystectomy with 

cholangiogram, gastrojejunostomy tube placement, pancreatic necrosectomy


leukocytosis- improving





Plan


.


Patient slightly more awake today following some commands


Will discontinue sedation and try pressure support as tolerated


S/P Exploratory laparotomy, lysis of adhesions, subtotal cholecystectomy with 

cholangiogram, gastrojejunostomy tube placement, pancreatic necrosectomy with 

multiple drains in place-- on 6/30 


ABX per ID 


Follow surgery recs


Continue TF and TPN for nutrition support 


Vasopressors for hypotension to keep MAP above 65 


DVT/GI PPX


D/W RN and RT 








CC time 30 minutes











STEVE MIRANDA MD               Jul 7, 2020 08:43

## 2020-07-07 NOTE — PDOC
Infectious Disease Note


Subjective


Subjective


Sedated 


Remains on ventilator support, FiO2 40% 5 PEEP


TPN


Still on vasopressin, off levophed





ROS


ROS


unable to obtain





Vital Sign


Vital Signs





Vital Signs








  Date Time  Temp Pulse Resp B/P (MAP) Pulse Ox O2 Delivery O2 Flow Rate FiO2


 


7/7/20 06:00  79 24 134/92 (106) 100 Ventilator  


 


7/7/20 04:00 98.1       





 98.1       


 


7/7/20 00:25       40.0 











Physical Exam


PHYSICAL EXAM


GENERAL: Alert but not responsive. ill appearing


HEENT: Pupils equal, oral cavity dry. + NGT


NECK:  Tracheostomy 


LUNGS: Diminished aeration bases,  CT on left 


HEART:  S1, S2, regular w/ PVCs 


ABDOMEN: Sightly Distended,  bowel sounds hypoactive, soft, richardson x 2, 3 ROBERT 

drains, G-J tube and + wound vac 


: [place] in place 


EXTREMITIES: Generalized edema, no cyanosis. SCDs & Podus boots bilaterally  


SKIN: warm touch. No signs of rash.  


LUE-PICC without signs of complications 


LUE art-line out, mottling left forearm about ole art-line site is improving, 

some better. RP palpable, cap refill brisk.


NEURO: alert but not responsive - ? tracking





Labs


Lab





Laboratory Tests








Test


 7/6/20


11:56 7/6/20


16:51 7/7/20


00:00 7/7/20


05:20


 


Glucose (Fingerstick)


 154 mg/dL


(70-99) 153 mg/dL


(70-99) 123 mg/dL


(70-99) 





 


White Blood Count


 


 


 


 15.0 x10^3/uL


(4.0-11.0)


 


Red Blood Count


 


 


 


 2.54 x10^6/uL


(3.50-5.40)


 


Hemoglobin


 


 


 


 7.5 g/dL


(12.0-15.5)


 


Hematocrit


 


 


 


 22.5 %


(36.0-47.0)


 


Mean Corpuscular Volume    89 fL () 


 


Mean Corpuscular Hemoglobin    30 pg (25-35) 


 


Mean Corpuscular Hemoglobin


Concent 


 


 


 33 g/dL


(31-37)


 


Red Cell Distribution Width


 


 


 


 14.8 %


(11.5-14.5)


 


Platelet Count


 


 


 


 414 x10^3/uL


(140-400)


 


Neutrophils (%) (Auto)    86 % (31-73) 


 


Lymphocytes (%) (Auto)    7 % (24-48) 


 


Monocytes (%) (Auto)    6 % (0-9) 


 


Eosinophils (%) (Auto)    1 % (0-3) 


 


Basophils (%) (Auto)    0 % (0-3) 


 


Neutrophils # (Auto)


 


 


 


 12.9 x10^3/uL


(1.8-7.7)


 


Lymphocytes # (Auto)


 


 


 


 1.0 x10^3/uL


(1.0-4.8)


 


Monocytes # (Auto)


 


 


 


 0.9 x10^3/uL


(0.0-1.1)


 


Eosinophils # (Auto)


 


 


 


 0.2 x10^3/uL


(0.0-0.7)


 


Basophils # (Auto)


 


 


 


 0.0 x10^3/uL


(0.0-0.2)


 


Test


 7/7/20


05:32 


 


 





 


Glucose (Fingerstick)


 159 mg/dL


(70-99) 


 


 











Micro


6/7 





GRAM STAIN  Final  


        Final





        GRAM NEGATIVE RODS:MODERATE


        SQUAMOUS EPI CELL:NOT APPLICABLE


        PMN (WBCs):RARE


        YEAST:MODERATE


        Unless otherwise specified, Testing Performed by:


        [address] [address]


        For Inquires, the Physician may contact the Microbiology


        department at [phone]





  ANAEROBIC-AEROBIC CULTURE  Preliminary  


        Preliminary





        MANY GRAM NEGATIVE RODS on 06/09/20 at 1158


        FINAL ID= [PSEUDOMONAS AERUGINOSA]


        PSEUDOMONAS AERUGINOSA





  ANTIMICROBIAL SUSCEPTIBILITY  Preliminary  


        Comment





        NEG ANSON 56


        PSEUDOMONAS AERUGINOSA


        ANTIBIOTIC                        RESULT          INTERPRETATION


        AMIKACIN                          <=16                  S


        AZTREONAM                         >16                   R


        CEFTAZIDIME                       >16                   R


        CIPROFLOXACIN                     <=0.25                S


        CEFEPIME                          16                    I


        CEFTAZIDIME/AVIBACTAM             <=4                   S


        GENTAMICIN                        <=2                   S














                               ** CONTINUED ON NEXT PAGE **





---------------------------------------------------

-----------------------------------------





RUN DATE: 06/11/20                  [facility] *LIVE*               

  PAGE 2   


RUN TIME: 1016                            Specimen Inquiry                    


-----------------------------------------

---------------------------------------------------





SPEC: [id number]    PATIENT: JESENIA BEAN                [id number]  

(Continued)


 

--------------------------------------------------------------------------------


------------








-------------------

-------------------------------------------------------------------------





  Procedure                         Result                                      

         


 

--------------------------------------------------------------------------------


------------





  ANTIMICROBIAL SUSCEPTIBILITY  Preliminary   (continued)


        LEVOFLOXACIN                      <=0.5                 S


        MEROPENEM                         <=1                   S


        PIPERACILLIN/TAZOBACTAM           64                    S


        TOBRAMYCIN                        <=2                   S


        Unless otherwise specified, Testing Performed by:


        [address] [address]


        For Inquires, the Physician may contact the Microbiology


        department at [phone]











CT Scan 6/6





IMPRESSION:


1. Removal of the percutaneous pigtail drainage catheters since the prior 


exam. Sequela of pancreatitis with extensive pseudocysts again 


demonstrated, the right-sided collections are slightly larger since the 


prior exam, the left-sided collections are stable. See above.


2. Moderate to large left pleural effusion with atelectasis and collapse 


of most of the left lower lobe, stable. Small right pleural effusion is 


stable.


3. Gallstone.





Objective


Assessment


Patient with prolonged hospitalization more than 3 months


Multiple medical problems


Multiple surgical procedures





Off Versed 7/4


S/P Exp. Lap, REN, subtotal cholecystectomy with cholangiogram, G-J tube 

placement & pancreatic necrosectomy on June 30 YEAST/PSA 


Leukocytosis - leukomoid reaction likely postoperative - improving


Fever intermittently source likely GI


Acute gallstone pancreatitis with persistent necrosis


  -CT a/p 4/9.  Increased ascites. Persistent evidence of necrotizing 

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


           - 4/27. status post ROBERT drain placement; C. parapsilosis. s/p drain 

5/6 + yeast & high amylase; s/p additional drain on 5/8. Drains removed. 


           -5/6. fluid  candida parapsilosis fluid, amylase high


           - 6/6 showed multiple pseudocysts, slight larger on the right. s/p 

drains x 3, 6/7.  + PSAE (MDRO-R Cefepime, Zosyn ANSON < 64) and yeast, 


           -6/7 s/p drain replacement x 3; fluid cult PSAE (MDRO), yeast; 

treated


Ascites s/p paracentesis 4/15 & 5/6. C. parapsilosis 


Cholelithiasis with thickening of the gallbladder wall.


JED, Hyperkalemia, Metabolic acidosis off dialysis


Acute hypoxic resp failure. trach/vent. sputum 6/13  + PSAE (I merrem) 


Pleural effusions s/p left thoracentesis, 5/12. no culture. s/p left chest tube,

6/15 no growth


Hypocalcemia 


Prediabetes


HTN


Anemia s/p RBCs





Plan


Plan of Care





PSA from 6/30 I to Meropenem but WBC improving so will try to hold changing abx 

to Cipro and or Avycaz to try and save potential abx options


continue merrem and micafungin but d/c Dapto


Monitor Mental status ? coming off sedation


Monitor labs


f/u cultures/susceptibilities still pending 


wound care /drain management as directed


Monitor left forearm








Contact isolation for CRE/MDRO


Critically ill





D/w nursing











RASHAWN ROSEN MD               Jul 7, 2020 07:14

## 2020-07-08 VITALS — SYSTOLIC BLOOD PRESSURE: 135 MMHG | DIASTOLIC BLOOD PRESSURE: 67 MMHG

## 2020-07-08 VITALS — SYSTOLIC BLOOD PRESSURE: 135 MMHG | DIASTOLIC BLOOD PRESSURE: 77 MMHG

## 2020-07-08 VITALS — DIASTOLIC BLOOD PRESSURE: 77 MMHG | SYSTOLIC BLOOD PRESSURE: 123 MMHG

## 2020-07-08 VITALS — DIASTOLIC BLOOD PRESSURE: 75 MMHG | SYSTOLIC BLOOD PRESSURE: 139 MMHG

## 2020-07-08 VITALS — SYSTOLIC BLOOD PRESSURE: 133 MMHG | DIASTOLIC BLOOD PRESSURE: 74 MMHG

## 2020-07-08 VITALS — DIASTOLIC BLOOD PRESSURE: 82 MMHG | SYSTOLIC BLOOD PRESSURE: 141 MMHG

## 2020-07-08 VITALS — DIASTOLIC BLOOD PRESSURE: 82 MMHG | SYSTOLIC BLOOD PRESSURE: 139 MMHG

## 2020-07-08 VITALS — SYSTOLIC BLOOD PRESSURE: 132 MMHG | DIASTOLIC BLOOD PRESSURE: 83 MMHG

## 2020-07-08 VITALS — DIASTOLIC BLOOD PRESSURE: 81 MMHG | SYSTOLIC BLOOD PRESSURE: 140 MMHG

## 2020-07-08 VITALS — DIASTOLIC BLOOD PRESSURE: 57 MMHG | SYSTOLIC BLOOD PRESSURE: 107 MMHG

## 2020-07-08 VITALS — SYSTOLIC BLOOD PRESSURE: 144 MMHG | DIASTOLIC BLOOD PRESSURE: 78 MMHG

## 2020-07-08 VITALS — DIASTOLIC BLOOD PRESSURE: 60 MMHG | SYSTOLIC BLOOD PRESSURE: 120 MMHG

## 2020-07-08 VITALS — DIASTOLIC BLOOD PRESSURE: 81 MMHG | SYSTOLIC BLOOD PRESSURE: 137 MMHG

## 2020-07-08 VITALS — SYSTOLIC BLOOD PRESSURE: 146 MMHG | DIASTOLIC BLOOD PRESSURE: 82 MMHG

## 2020-07-08 VITALS — DIASTOLIC BLOOD PRESSURE: 81 MMHG | SYSTOLIC BLOOD PRESSURE: 134 MMHG

## 2020-07-08 VITALS — SYSTOLIC BLOOD PRESSURE: 150 MMHG | DIASTOLIC BLOOD PRESSURE: 76 MMHG

## 2020-07-08 VITALS — SYSTOLIC BLOOD PRESSURE: 138 MMHG | DIASTOLIC BLOOD PRESSURE: 79 MMHG

## 2020-07-08 VITALS — SYSTOLIC BLOOD PRESSURE: 136 MMHG | DIASTOLIC BLOOD PRESSURE: 70 MMHG

## 2020-07-08 VITALS — SYSTOLIC BLOOD PRESSURE: 149 MMHG | DIASTOLIC BLOOD PRESSURE: 94 MMHG

## 2020-07-08 VITALS — DIASTOLIC BLOOD PRESSURE: 69 MMHG | SYSTOLIC BLOOD PRESSURE: 126 MMHG

## 2020-07-08 VITALS — DIASTOLIC BLOOD PRESSURE: 81 MMHG | SYSTOLIC BLOOD PRESSURE: 158 MMHG

## 2020-07-08 VITALS — SYSTOLIC BLOOD PRESSURE: 135 MMHG | DIASTOLIC BLOOD PRESSURE: 78 MMHG

## 2020-07-08 LAB
ALBUMIN SERPL-MCNC: 1.1 G/DL (ref 3.4–5)
ALBUMIN/GLOB SERPL: 0.3 {RATIO} (ref 1–1.7)
ALP SERPL-CCNC: 239 U/L (ref 46–116)
ALT SERPL-CCNC: 37 U/L (ref 14–59)
ANION GAP SERPL CALC-SCNC: 3 MMOL/L (ref 6–14)
AST SERPL-CCNC: 25 U/L (ref 15–37)
BASOPHILS # BLD AUTO: 0 X10^3/UL (ref 0–0.2)
BASOPHILS NFR BLD: 0 % (ref 0–3)
BILIRUB SERPL-MCNC: 0.2 MG/DL (ref 0.2–1)
BUN SERPL-MCNC: 15 MG/DL (ref 7–20)
BUN/CREAT SERPL: 30 (ref 6–20)
CALCIUM SERPL-MCNC: 10.2 MG/DL (ref 8.5–10.1)
CHLORIDE SERPL-SCNC: 109 MMOL/L (ref 98–107)
CO2 SERPL-SCNC: 31 MMOL/L (ref 21–32)
CREAT SERPL-MCNC: 0.5 MG/DL (ref 0.6–1)
EOSINOPHIL NFR BLD: 0.2 X10^3/UL (ref 0–0.7)
EOSINOPHIL NFR BLD: 1 % (ref 0–3)
ERYTHROCYTE [DISTWIDTH] IN BLOOD BY AUTOMATED COUNT: 15.1 % (ref 11.5–14.5)
GFR SERPLBLD BASED ON 1.73 SQ M-ARVRAT: 131.1 ML/MIN
GLOBULIN SER-MCNC: 4 G/DL (ref 2.2–3.8)
GLUCOSE SERPL-MCNC: 139 MG/DL (ref 70–99)
HCT VFR BLD CALC: 26.3 % (ref 36–47)
HGB BLD-MCNC: 8.7 G/DL (ref 12–15.5)
LYMPHOCYTES # BLD: 1.4 X10^3/UL (ref 1–4.8)
LYMPHOCYTES NFR BLD AUTO: 9 % (ref 24–48)
MCH RBC QN AUTO: 30 PG (ref 25–35)
MCHC RBC AUTO-ENTMCNC: 33 G/DL (ref 31–37)
MCV RBC AUTO: 89 FL (ref 79–100)
MONO #: 1 X10^3/UL (ref 0–1.1)
MONOCYTES NFR BLD: 7 % (ref 0–9)
NEUT #: 12.8 X10^3/UL (ref 1.8–7.7)
NEUTROPHILS NFR BLD AUTO: 83 % (ref 31–73)
PLATELET # BLD AUTO: 597 X10^3/UL (ref 140–400)
POTASSIUM SERPL-SCNC: 4.2 MMOL/L (ref 3.5–5.1)
PROT SERPL-MCNC: 5.1 G/DL (ref 6.4–8.2)
RBC # BLD AUTO: 2.95 X10^6/UL (ref 3.5–5.4)
SODIUM SERPL-SCNC: 143 MMOL/L (ref 136–145)
WBC # BLD AUTO: 15.4 X10^3/UL (ref 4–11)

## 2020-07-08 RX ADMIN — INSULIN LISPRO SCH UNITS: 100 INJECTION, SOLUTION INTRAVENOUS; SUBCUTANEOUS at 17:44

## 2020-07-08 RX ADMIN — INSULIN LISPRO SCH UNITS: 100 INJECTION, SOLUTION INTRAVENOUS; SUBCUTANEOUS at 00:49

## 2020-07-08 RX ADMIN — Medication PRN EACH: at 13:27

## 2020-07-08 RX ADMIN — IPRATROPIUM BROMIDE AND ALBUTEROL SULFATE SCH ML: .5; 3 SOLUTION RESPIRATORY (INHALATION) at 20:18

## 2020-07-08 RX ADMIN — IPRATROPIUM BROMIDE AND ALBUTEROL SULFATE SCH ML: .5; 3 SOLUTION RESPIRATORY (INHALATION) at 08:26

## 2020-07-08 RX ADMIN — BACITRACIN SCH MLS/HR: 5000 INJECTION, POWDER, FOR SOLUTION INTRAMUSCULAR at 21:15

## 2020-07-08 RX ADMIN — MEROPENEM SCH MLS/HR: 500 INJECTION, POWDER, FOR SOLUTION INTRAVENOUS at 17:44

## 2020-07-08 RX ADMIN — ONDANSETRON PRN MG: 2 INJECTION INTRAMUSCULAR; INTRAVENOUS at 08:07

## 2020-07-08 RX ADMIN — INSULIN LISPRO SCH UNITS: 100 INJECTION, SOLUTION INTRAVENOUS; SUBCUTANEOUS at 11:54

## 2020-07-08 RX ADMIN — ENOXAPARIN SODIUM SCH MG: 40 INJECTION SUBCUTANEOUS at 08:08

## 2020-07-08 RX ADMIN — DEXTROSE SCH MLS/HR: 50 INJECTION, SOLUTION INTRAVENOUS at 08:07

## 2020-07-08 RX ADMIN — INSULIN LISPRO SCH UNITS: 100 INJECTION, SOLUTION INTRAVENOUS; SUBCUTANEOUS at 06:00

## 2020-07-08 RX ADMIN — IPRATROPIUM BROMIDE AND ALBUTEROL SULFATE SCH ML: .5; 3 SOLUTION RESPIRATORY (INHALATION) at 12:06

## 2020-07-08 RX ADMIN — IPRATROPIUM BROMIDE AND ALBUTEROL SULFATE SCH ML: .5; 3 SOLUTION RESPIRATORY (INHALATION) at 03:53

## 2020-07-08 RX ADMIN — CYCLOBENZAPRINE HYDROCHLORIDE PRN MG: 10 TABLET, FILM COATED ORAL at 21:14

## 2020-07-08 RX ADMIN — MEROPENEM SCH MLS/HR: 500 INJECTION, POWDER, FOR SOLUTION INTRAVENOUS at 00:46

## 2020-07-08 RX ADMIN — ACETYLCYSTEINE SCH MG: 200 INHALANT RESPIRATORY (INHALATION) at 20:00

## 2020-07-08 RX ADMIN — PANTOPRAZOLE SODIUM SCH MG: 40 INJECTION, POWDER, FOR SOLUTION INTRAVENOUS at 08:07

## 2020-07-08 RX ADMIN — MEROPENEM SCH MLS/HR: 500 INJECTION, POWDER, FOR SOLUTION INTRAVENOUS at 11:53

## 2020-07-08 RX ADMIN — IPRATROPIUM BROMIDE AND ALBUTEROL SULFATE SCH ML: .5; 3 SOLUTION RESPIRATORY (INHALATION) at 23:49

## 2020-07-08 RX ADMIN — MEROPENEM SCH MLS/HR: 500 INJECTION, POWDER, FOR SOLUTION INTRAVENOUS at 06:21

## 2020-07-08 RX ADMIN — ACETYLCYSTEINE SCH MG: 200 INHALANT RESPIRATORY (INHALATION) at 08:25

## 2020-07-08 RX ADMIN — IPRATROPIUM BROMIDE AND ALBUTEROL SULFATE SCH ML: .5; 3 SOLUTION RESPIRATORY (INHALATION) at 16:03

## 2020-07-08 NOTE — NUR
PT/OT at bedside working with pt. Pt sat at the edge of bed, on 10L/40% trach shield. 
Tolerated well. All tubes secured.

## 2020-07-08 NOTE — NUR
SS following up with discharge planning. SS reviewed pt chart and discussed with pt RN. Pt 
is now on trach collar. Pt on TPN, Micafungin, and Meropenem. All drains and tubes remain at 
this time. Pt has J tube and Chest tube. Pt has three ROBERT drains and two Avi drains. Pt 
is self pay pt. SS will continue to follow for discharge planning.

## 2020-07-08 NOTE — PDOC
PULMONARY PROGRESS NOTES


Subjective


Currently on pressure support of 12 large amounts of secretions being coughed up


Vitals





Vital Signs








  Date Time  Temp Pulse Resp B/P (MAP) Pulse Ox O2 Delivery O2 Flow Rate FiO2


 


7/8/20 07:00  109 16 140/81 (100) 100 Ventilator  


 


7/8/20 04:00 98.7       





 98.7       








Comments


trach/sedated on vent


Lungs:  Crackles


Cardiovascular:  S1, S2


Abdomen:  Soft, Non-tender, Other (multiple ROBERT drains )


Extremities:  Other (+1 BLE edema)


Skin:  Warm


Labs





Laboratory Tests








Test


 7/6/20


11:56 7/6/20


16:51 7/7/20


00:00 7/7/20


05:20


 


Glucose (Fingerstick)


 154 mg/dL


(70-99) 153 mg/dL


(70-99) 123 mg/dL


(70-99) 





 


White Blood Count


 


 


 


 15.0 x10^3/uL


(4.0-11.0)


 


Red Blood Count


 


 


 


 2.54 x10^6/uL


(3.50-5.40)


 


Hemoglobin


 


 


 


 7.5 g/dL


(12.0-15.5)


 


Hematocrit


 


 


 


 22.5 %


(36.0-47.0)


 


Mean Corpuscular Volume    89 fL () 


 


Mean Corpuscular Hemoglobin    30 pg (25-35) 


 


Mean Corpuscular Hemoglobin


Concent 


 


 


 33 g/dL


(31-37)


 


Red Cell Distribution Width


 


 


 


 14.8 %


(11.5-14.5)


 


Platelet Count


 


 


 


 414 x10^3/uL


(140-400)


 


Neutrophils (%) (Auto)    86 % (31-73) 


 


Lymphocytes (%) (Auto)    7 % (24-48) 


 


Monocytes (%) (Auto)    6 % (0-9) 


 


Eosinophils (%) (Auto)    1 % (0-3) 


 


Basophils (%) (Auto)    0 % (0-3) 


 


Neutrophils # (Auto)


 


 


 


 12.9 x10^3/uL


(1.8-7.7)


 


Lymphocytes # (Auto)


 


 


 


 1.0 x10^3/uL


(1.0-4.8)


 


Monocytes # (Auto)


 


 


 


 0.9 x10^3/uL


(0.0-1.1)


 


Eosinophils # (Auto)


 


 


 


 0.2 x10^3/uL


(0.0-0.7)


 


Basophils # (Auto)


 


 


 


 0.0 x10^3/uL


(0.0-0.2)


 


Test


 7/7/20


05:32 7/7/20


12:21 7/7/20


17:50 7/8/20


00:45


 


Glucose (Fingerstick)


 159 mg/dL


(70-99) 164 mg/dL


(70-99) 135 mg/dL


(70-99) 186 mg/dL


(70-99)


 


Test


 7/8/20


06:20 7/8/20


06:24 


 





 


White Blood Count


 15.4 x10^3/uL


(4.0-11.0) 


 


 





 


Red Blood Count


 2.95 x10^6/uL


(3.50-5.40) 


 


 





 


Hemoglobin


 8.7 g/dL


(12.0-15.5) 


 


 





 


Hematocrit


 26.3 %


(36.0-47.0) 


 


 





 


Mean Corpuscular Volume 89 fL ()    


 


Mean Corpuscular Hemoglobin 30 pg (25-35)    


 


Mean Corpuscular Hemoglobin


Concent 33 g/dL


(31-37) 


 


 





 


Red Cell Distribution Width


 15.1 %


(11.5-14.5) 


 


 





 


Platelet Count


 597 x10^3/uL


(140-400) 


 


 





 


Neutrophils (%) (Auto) 83 % (31-73)    


 


Lymphocytes (%) (Auto) 9 % (24-48)    


 


Monocytes (%) (Auto) 7 % (0-9)    


 


Eosinophils (%) (Auto) 1 % (0-3)    


 


Basophils (%) (Auto) 0 % (0-3)    


 


Neutrophils # (Auto)


 12.8 x10^3/uL


(1.8-7.7) 


 


 





 


Lymphocytes # (Auto)


 1.4 x10^3/uL


(1.0-4.8) 


 


 





 


Monocytes # (Auto)


 1.0 x10^3/uL


(0.0-1.1) 


 


 





 


Eosinophils # (Auto)


 0.2 x10^3/uL


(0.0-0.7) 


 


 





 


Basophils # (Auto)


 0.0 x10^3/uL


(0.0-0.2) 


 


 





 


Sodium Level


 143 mmol/L


(136-145) 


 


 





 


Potassium Level


 4.2 mmol/L


(3.5-5.1) 


 


 





 


Chloride Level


 109 mmol/L


() 


 


 





 


Carbon Dioxide Level


 31 mmol/L


(21-32) 


 


 





 


Anion Gap 3 (6-14)    


 


Blood Urea Nitrogen


 15 mg/dL


(7-20) 


 


 





 


Creatinine


 0.5 mg/dL


(0.6-1.0) 


 


 





 


Estimated GFR


(Cockcroft-Gault) 131.1 


 


 


 





 


BUN/Creatinine Ratio 30 (6-20)    


 


Glucose Level


 139 mg/dL


(70-99) 


 


 





 


Calcium Level


 10.2 mg/dL


(8.5-10.1) 


 


 





 


Total Bilirubin


 0.2 mg/dL


(0.2-1.0) 


 


 





 


Aspartate Amino Transf


(AST/SGOT) 25 U/L (15-37) 


 


 


 





 


Alanine Aminotransferase


(ALT/SGPT) 37 U/L (14-59) 


 


 


 





 


Alkaline Phosphatase


 239 U/L


() 


 


 





 


Total Protein


 5.1 g/dL


(6.4-8.2) 


 


 





 


Albumin


 1.1 g/dL


(3.4-5.0) 


 


 





 


Albumin/Globulin Ratio 0.3 (1.0-1.7)    


 


Glucose (Fingerstick)


 


 134 mg/dL


(70-99) 


 











Laboratory Tests








Test


 7/7/20


12:21 7/7/20


17:50 7/8/20


00:45 7/8/20


06:20


 


Glucose (Fingerstick)


 164 mg/dL


(70-99) 135 mg/dL


(70-99) 186 mg/dL


(70-99) 





 


White Blood Count


 


 


 


 15.4 x10^3/uL


(4.0-11.0)


 


Red Blood Count


 


 


 


 2.95 x10^6/uL


(3.50-5.40)


 


Hemoglobin


 


 


 


 8.7 g/dL


(12.0-15.5)


 


Hematocrit


 


 


 


 26.3 %


(36.0-47.0)


 


Mean Corpuscular Volume    89 fL () 


 


Mean Corpuscular Hemoglobin    30 pg (25-35) 


 


Mean Corpuscular Hemoglobin


Concent 


 


 


 33 g/dL


(31-37)


 


Red Cell Distribution Width


 


 


 


 15.1 %


(11.5-14.5)


 


Platelet Count


 


 


 


 597 x10^3/uL


(140-400)


 


Neutrophils (%) (Auto)    83 % (31-73) 


 


Lymphocytes (%) (Auto)    9 % (24-48) 


 


Monocytes (%) (Auto)    7 % (0-9) 


 


Eosinophils (%) (Auto)    1 % (0-3) 


 


Basophils (%) (Auto)    0 % (0-3) 


 


Neutrophils # (Auto)


 


 


 


 12.8 x10^3/uL


(1.8-7.7)


 


Lymphocytes # (Auto)


 


 


 


 1.4 x10^3/uL


(1.0-4.8)


 


Monocytes # (Auto)


 


 


 


 1.0 x10^3/uL


(0.0-1.1)


 


Eosinophils # (Auto)


 


 


 


 0.2 x10^3/uL


(0.0-0.7)


 


Basophils # (Auto)


 


 


 


 0.0 x10^3/uL


(0.0-0.2)


 


Sodium Level


 


 


 


 143 mmol/L


(136-145)


 


Potassium Level


 


 


 


 4.2 mmol/L


(3.5-5.1)


 


Chloride Level


 


 


 


 109 mmol/L


()


 


Carbon Dioxide Level


 


 


 


 31 mmol/L


(21-32)


 


Anion Gap    3 (6-14) 


 


Blood Urea Nitrogen


 


 


 


 15 mg/dL


(7-20)


 


Creatinine


 


 


 


 0.5 mg/dL


(0.6-1.0)


 


Estimated GFR


(Cockcroft-Gault) 


 


 


 131.1 





 


BUN/Creatinine Ratio    30 (6-20) 


 


Glucose Level


 


 


 


 139 mg/dL


(70-99)


 


Calcium Level


 


 


 


 10.2 mg/dL


(8.5-10.1)


 


Total Bilirubin


 


 


 


 0.2 mg/dL


(0.2-1.0)


 


Aspartate Amino Transf


(AST/SGOT) 


 


 


 25 U/L (15-37) 





 


Alanine Aminotransferase


(ALT/SGPT) 


 


 


 37 U/L (14-59) 





 


Alkaline Phosphatase


 


 


 


 239 U/L


()


 


Total Protein


 


 


 


 5.1 g/dL


(6.4-8.2)


 


Albumin


 


 


 


 1.1 g/dL


(3.4-5.0)


 


Albumin/Globulin Ratio    0.3 (1.0-1.7) 


 


Test


 7/8/20


06:24 


 


 





 


Glucose (Fingerstick)


 134 mg/dL


(70-99) 


 


 











Medications





Active Scripts








 Medications  Dose


 Route/Sig


 Max Daily Dose Days Date Category


 


 Bisoprolol


 Fumarate 5 Mg


 Tablet  10 Mg


 PO DAILY


   3/16/20 Reported








Comments


 CXR 6/28/20


IMPRESSION:


No significant interval change compared to 6/25/2020.


 











ct abdomen /pelvis 6/6


1. Removal of the percutaneous pigtail drainage catheters since the prior 


exam. Sequela of pancreatitis with extensive pseudocysts again 


demonstrated, the right-sided collections are slightly larger since the 


prior exam, the left-sided collections are stable. See above.


2. Moderate to large left pleural effusion with atelectasis and collapse 


of most of the left lower lobe, stable. Small right pleural effusion is 


stable.


3. Gallstone.





ct chest 6/15 reviewed








 GRAM NEG COCCOBACILLI:MANY


        SQUAMOUS EPI CELL:RARE


        PMN (WBCs):FEW


        Unless otherwise specified, Testing Performed by:


        [facility]


        [address] [place] [address]


        For Inquires, the Physician may contact the Microbiology


        department at [phone]





  RESPIRATORY CULTURE  Final  


        Final





        MANY GRAM NEGATIVE RODS on 06/15/20 at 1107


        FINAL ID= [PSEUDOMONAS AERUGINOSA]


        MICRO CHARGES


        PSEUDOMONAS AERUGINOSA





  ANTIMICROBIAL SUSCEPTIBILITY  Final  


        Comment





        NEG ANSON 56


        PSEUDOMONAS AERUGINOSA


        ANTIBIOTIC                        RESULT          INTERPRETATION


        AMIKACIN                          <=16                  S


        AZTREONAM                         <=4                   S


        CEFTAZIDIME                       <=1                   S


        CIPROFLOXACIN                     <=0.25                S


        CEFEPIME                          <=2                   S


        CEFTAZIDIME/AVIBACTAM             <=4                   S


        GENTAMICIN                        <=2                   S


        LEVOFLOXACIN                      <=0.5                 S





Impression


.





IMPRESSION:


1.  Acute hypoxemic respiratory failure secondary to ARDS status post trach, 

developed anemia 6/7, blood drainage from RLQ abdomen drain site, and 

surrounding firmness  / developed septic shock 6/7 from abdomen source, required

levo 6/7


s/p 3 new drains 6/7 with brown color drainage,  


2.  Gallstone pancreatitis, now with ongoing bleeding from prior drain. Anemic. 

s/p Tx multiple units over several days


3.  septic shock/sepsis, recurrent 6/7, source abdomen. ,


4.  Acute kidney injury-, Off HD--renal function decling. suspect JED on CKD due

to hypotension , improved now


5.  Acute gallstone pancreatitis.


6.  Hypoalbuminemia.


7.  Moderate persistent effusions, s/p left thora  5/12, reaccumulation of left 

effusion. O2 requirement not changed. 


8.  Fever- ,hypotension. suspect recurrent sepsis/ likely pancreatic source.  

Per ID, per surgery--


9.  Chronic anemia-- ongoing / s/p PRBC


10. Covid 19 testing negative


11. Moderate to large ascites-S/P paracentisis


12.S/P paracentisis with 4 liters removed on 4/15/20


13. S/P IR drain placement on 5/8/2020, removal, re inserted 6/7


14. Depression/Anxiety 


15. Increase effusion, ? loculated/ s/p chest tube.. drainage slowing down. 





6/30


S/P Exploratory laparotomy, lysis of adhesions, subtotal cholecystectomy with 

cholangiogram, gastrojejunostomy tube placement, pancreatic necrosectomy


leukocytosis- improving





Plan


.


Discontinue ventilator trial of trach shield


Back on pressure support if she does  not tolerate trach


Will discontinue sedation and try pressure support as tolerated


S/P Exploratory laparotomy, lysis of adhesions, subtotal cholecystectomy with 

cholangiogram, gastrojejunostomy tube placement, pancreatic necrosectomy with 

multiple drains in place-- on 6/30 


ABX per ID 


Follow surgery recs


Continue TF and TPN for nutrition support 


Vasopressors for hypotension to keep MAP above 65 


DVT/GI PPX


D/W RN and RT 








CC time 30 minutes











STEVE MIRANDA MD               Jul 8, 2020 08:36

## 2020-07-08 NOTE — PDOC
SURGICAL PROGRESS NOTE


Subjective


asleep during visit


Vital Signs





Vital Signs








  Date Time  Temp Pulse Resp B/P (MAP) Pulse Ox O2 Delivery O2 Flow Rate FiO2


 


7/8/20 09:00  115 24 135/67 (89) 100 Ventilator  


 


7/8/20 08:00 99.4       





 99.4       








I&O











Intake and Output 


 


 7/8/20





 07:00


 


Intake Total 3245.6 ml


 


Output Total 3850 ml


 


Balance -604.4 ml


 


 


 


IV Total 2500.6 ml


 


Tube Feeding 645 ml


 


Other 100 ml


 


Output Urine Total 3325 ml


 


Gastric Drainage Total 0 ml


 


Drainage Total 525 ml








PATIENT HAS A VILLASENOR:  Yes


General:  Cooperative, No acute distress


HEENT:  Other (trach in place)


Abdomen:  Soft, Other (multiple drains )


Labs





Laboratory Tests








Test


 7/6/20


11:56 7/6/20


16:51 7/7/20


00:00 7/7/20


05:20


 


Glucose (Fingerstick)


 154 mg/dL


(70-99) 153 mg/dL


(70-99) 123 mg/dL


(70-99) 





 


White Blood Count


 


 


 


 15.0 x10^3/uL


(4.0-11.0)


 


Red Blood Count


 


 


 


 2.54 x10^6/uL


(3.50-5.40)


 


Hemoglobin


 


 


 


 7.5 g/dL


(12.0-15.5)


 


Hematocrit


 


 


 


 22.5 %


(36.0-47.0)


 


Mean Corpuscular Volume    89 fL () 


 


Mean Corpuscular Hemoglobin    30 pg (25-35) 


 


Mean Corpuscular Hemoglobin


Concent 


 


 


 33 g/dL


(31-37)


 


Red Cell Distribution Width


 


 


 


 14.8 %


(11.5-14.5)


 


Platelet Count


 


 


 


 414 x10^3/uL


(140-400)


 


Neutrophils (%) (Auto)    86 % (31-73) 


 


Lymphocytes (%) (Auto)    7 % (24-48) 


 


Monocytes (%) (Auto)    6 % (0-9) 


 


Eosinophils (%) (Auto)    1 % (0-3) 


 


Basophils (%) (Auto)    0 % (0-3) 


 


Neutrophils # (Auto)


 


 


 


 12.9 x10^3/uL


(1.8-7.7)


 


Lymphocytes # (Auto)


 


 


 


 1.0 x10^3/uL


(1.0-4.8)


 


Monocytes # (Auto)


 


 


 


 0.9 x10^3/uL


(0.0-1.1)


 


Eosinophils # (Auto)


 


 


 


 0.2 x10^3/uL


(0.0-0.7)


 


Basophils # (Auto)


 


 


 


 0.0 x10^3/uL


(0.0-0.2)


 


Test


 7/7/20


05:32 7/7/20


12:21 7/7/20


17:50 7/8/20


00:45


 


Glucose (Fingerstick)


 159 mg/dL


(70-99) 164 mg/dL


(70-99) 135 mg/dL


(70-99) 186 mg/dL


(70-99)


 


Test


 7/8/20


06:20 7/8/20


06:24 


 





 


White Blood Count


 15.4 x10^3/uL


(4.0-11.0) 


 


 





 


Red Blood Count


 2.95 x10^6/uL


(3.50-5.40) 


 


 





 


Hemoglobin


 8.7 g/dL


(12.0-15.5) 


 


 





 


Hematocrit


 26.3 %


(36.0-47.0) 


 


 





 


Mean Corpuscular Volume 89 fL ()    


 


Mean Corpuscular Hemoglobin 30 pg (25-35)    


 


Mean Corpuscular Hemoglobin


Concent 33 g/dL


(31-37) 


 


 





 


Red Cell Distribution Width


 15.1 %


(11.5-14.5) 


 


 





 


Platelet Count


 597 x10^3/uL


(140-400) 


 


 





 


Neutrophils (%) (Auto) 83 % (31-73)    


 


Lymphocytes (%) (Auto) 9 % (24-48)    


 


Monocytes (%) (Auto) 7 % (0-9)    


 


Eosinophils (%) (Auto) 1 % (0-3)    


 


Basophils (%) (Auto) 0 % (0-3)    


 


Neutrophils # (Auto)


 12.8 x10^3/uL


(1.8-7.7) 


 


 





 


Lymphocytes # (Auto)


 1.4 x10^3/uL


(1.0-4.8) 


 


 





 


Monocytes # (Auto)


 1.0 x10^3/uL


(0.0-1.1) 


 


 





 


Eosinophils # (Auto)


 0.2 x10^3/uL


(0.0-0.7) 


 


 





 


Basophils # (Auto)


 0.0 x10^3/uL


(0.0-0.2) 


 


 





 


Sodium Level


 143 mmol/L


(136-145) 


 


 





 


Potassium Level


 4.2 mmol/L


(3.5-5.1) 


 


 





 


Chloride Level


 109 mmol/L


() 


 


 





 


Carbon Dioxide Level


 31 mmol/L


(21-32) 


 


 





 


Anion Gap 3 (6-14)    


 


Blood Urea Nitrogen


 15 mg/dL


(7-20) 


 


 





 


Creatinine


 0.5 mg/dL


(0.6-1.0) 


 


 





 


Estimated GFR


(Cockcroft-Gault) 131.1 


 


 


 





 


BUN/Creatinine Ratio 30 (6-20)    


 


Glucose Level


 139 mg/dL


(70-99) 


 


 





 


Calcium Level


 10.2 mg/dL


(8.5-10.1) 


 


 





 


Total Bilirubin


 0.2 mg/dL


(0.2-1.0) 


 


 





 


Aspartate Amino Transf


(AST/SGOT) 25 U/L (15-37) 


 


 


 





 


Alanine Aminotransferase


(ALT/SGPT) 37 U/L (14-59) 


 


 


 





 


Alkaline Phosphatase


 239 U/L


() 


 


 





 


Total Protein


 5.1 g/dL


(6.4-8.2) 


 


 





 


Albumin


 1.1 g/dL


(3.4-5.0) 


 


 





 


Albumin/Globulin Ratio 0.3 (1.0-1.7)    


 


Glucose (Fingerstick)


 


 134 mg/dL


(70-99) 


 











Laboratory Tests








Test


 7/7/20


12:21 7/7/20


17:50 7/8/20


00:45 7/8/20


06:20


 


Glucose (Fingerstick)


 164 mg/dL


(70-99) 135 mg/dL


(70-99) 186 mg/dL


(70-99) 





 


White Blood Count


 


 


 


 15.4 x10^3/uL


(4.0-11.0)


 


Red Blood Count


 


 


 


 2.95 x10^6/uL


(3.50-5.40)


 


Hemoglobin


 


 


 


 8.7 g/dL


(12.0-15.5)


 


Hematocrit


 


 


 


 26.3 %


(36.0-47.0)


 


Mean Corpuscular Volume    89 fL () 


 


Mean Corpuscular Hemoglobin    30 pg (25-35) 


 


Mean Corpuscular Hemoglobin


Concent 


 


 


 33 g/dL


(31-37)


 


Red Cell Distribution Width


 


 


 


 15.1 %


(11.5-14.5)


 


Platelet Count


 


 


 


 597 x10^3/uL


(140-400)


 


Neutrophils (%) (Auto)    83 % (31-73) 


 


Lymphocytes (%) (Auto)    9 % (24-48) 


 


Monocytes (%) (Auto)    7 % (0-9) 


 


Eosinophils (%) (Auto)    1 % (0-3) 


 


Basophils (%) (Auto)    0 % (0-3) 


 


Neutrophils # (Auto)


 


 


 


 12.8 x10^3/uL


(1.8-7.7)


 


Lymphocytes # (Auto)


 


 


 


 1.4 x10^3/uL


(1.0-4.8)


 


Monocytes # (Auto)


 


 


 


 1.0 x10^3/uL


(0.0-1.1)


 


Eosinophils # (Auto)


 


 


 


 0.2 x10^3/uL


(0.0-0.7)


 


Basophils # (Auto)


 


 


 


 0.0 x10^3/uL


(0.0-0.2)


 


Sodium Level


 


 


 


 143 mmol/L


(136-145)


 


Potassium Level


 


 


 


 4.2 mmol/L


(3.5-5.1)


 


Chloride Level


 


 


 


 109 mmol/L


()


 


Carbon Dioxide Level


 


 


 


 31 mmol/L


(21-32)


 


Anion Gap    3 (6-14) 


 


Blood Urea Nitrogen


 


 


 


 15 mg/dL


(7-20)


 


Creatinine


 


 


 


 0.5 mg/dL


(0.6-1.0)


 


Estimated GFR


(Cockcroft-Gault) 


 


 


 131.1 





 


BUN/Creatinine Ratio    30 (6-20) 


 


Glucose Level


 


 


 


 139 mg/dL


(70-99)


 


Calcium Level


 


 


 


 10.2 mg/dL


(8.5-10.1)


 


Total Bilirubin


 


 


 


 0.2 mg/dL


(0.2-1.0)


 


Aspartate Amino Transf


(AST/SGOT) 


 


 


 25 U/L (15-37) 





 


Alanine Aminotransferase


(ALT/SGPT) 


 


 


 37 U/L (14-59) 





 


Alkaline Phosphatase


 


 


 


 239 U/L


()


 


Total Protein


 


 


 


 5.1 g/dL


(6.4-8.2)


 


Albumin


 


 


 


 1.1 g/dL


(3.4-5.0)


 


Albumin/Globulin Ratio    0.3 (1.0-1.7) 


 


Test


 7/8/20


06:24 


 


 





 


Glucose (Fingerstick)


 134 mg/dL


(70-99) 


 


 











Problem List


Problems


Medical Problems:


(1) Acute pancreatitis


Status: Acute  





(2) Cholelithiasis


Status: Acute  








Assessment/Plan


continue care





Justicifation of Admission Dx:


Justifications for Admission:


Justification of Admission Dx:  Yes











SAURAV NASH APRN             Jul 8, 2020 10:27

## 2020-07-08 NOTE — PDOC
Infectious Disease Note


Subjective


Subjective


alert and coughing - Nodding


Some nausea


Remains on ventilator support, FiO2 40% 5 PEEP


TPN


off vasopressin, off levophed





ROS


ROS


Difficult to completely obtain as only nods periodically





Vital Sign


Vital Signs





Vital Signs








  Date Time  Temp Pulse Resp B/P (MAP) Pulse Ox O2 Delivery O2 Flow Rate FiO2


 


7/8/20 06:00  107 20 136/70 (92) 100 Ventilator  


 


7/8/20 04:00 98.7       





 98.7       











Physical Exam


PHYSICAL EXAM


GENERAL: Alert, NAD tired appearing


HEENT: Pupils equal, oral cavity dry. + NGT


NECK:  Tracheostomy 


LUNGS: Diminished aeration bases,  CT on left 


HEART:  S1, S2, regular w/ PVCs 


ABDOMEN: Sightly Distended,  bowel sounds hypoactive, soft, richardson x 2, 3 ROBERT dr siu, G-J tube and + wound vac 


: [place] in place 


EXTREMITIES: Generalized edema, no cyanosis. SCDs & Podus boots bilaterally  


SKIN: warm touch. No signs of rash.  


LUE-PICC without signs of complications 


LUE art-line out, mottling left forearm about ole art-line site is improving. RP

palpable, cap refill brisk.


NEURO: alert and some responsive -  tracking





Labs


Lab





Laboratory Tests








Test


 7/7/20


12:21 7/7/20


17:50 7/8/20


00:45 7/8/20


06:20


 


Glucose (Fingerstick)


 164 mg/dL


(70-99) 135 mg/dL


(70-99) 186 mg/dL


(70-99) 





 


Sodium Level


 


 


 


 143 mmol/L


(136-145)


 


Potassium Level


 


 


 


 4.2 mmol/L


(3.5-5.1)


 


Chloride Level


 


 


 


 109 mmol/L


()


 


Carbon Dioxide Level


 


 


 


 31 mmol/L


(21-32)


 


Anion Gap    3 (6-14) 


 


Blood Urea Nitrogen


 


 


 


 15 mg/dL


(7-20)


 


Creatinine


 


 


 


 0.5 mg/dL


(0.6-1.0)


 


Estimated GFR


(Cockcroft-Gault) 


 


 


 131.1 





 


BUN/Creatinine Ratio    30 (6-20) 


 


Glucose Level


 


 


 


 139 mg/dL


(70-99)


 


Calcium Level


 


 


 


 10.2 mg/dL


(8.5-10.1)


 


Total Bilirubin


 


 


 


 0.2 mg/dL


(0.2-1.0)


 


Aspartate Amino Transf


(AST/SGOT) 


 


 


 25 U/L (15-37) 





 


Alanine Aminotransferase


(ALT/SGPT) 


 


 


 37 U/L (14-59) 





 


Alkaline Phosphatase


 


 


 


 239 U/L


()


 


Total Protein


 


 


 


 5.1 g/dL


(6.4-8.2)


 


Albumin


 


 


 


 1.1 g/dL


(3.4-5.0)


 


Albumin/Globulin Ratio    0.3 (1.0-1.7) 


 


Test


 7/8/20


06:24 


 


 





 


Glucose (Fingerstick)


 134 mg/dL


(70-99) 


 


 











Micro


6/7 





GRAM STAIN  Final  


        Final





        GRAM NEGATIVE RODS:MODERATE


        SQUAMOUS EPI CELL:NOT APPLICABLE


        PMN (WBCs):RARE


        YEAST:MODERATE


        Unless otherwise specified, Testing Performed by:


        [facility]


        [address] [place] [address]


        For Inquires, the Physician may contact the Microbiology


        department at [phone]





  ANAEROBIC-AEROBIC CULTURE  Preliminary  


        Preliminary





        MANY GRAM NEGATIVE RODS on 06/09/20 at 1158


        FINAL ID= [PSEUDOMONAS AERUGINOSA]


        PSEUDOMONAS AERUGINOSA





  ANTIMICROBIAL SUSCEPTIBILITY  Preliminary  


        Comment





        NEG ANSON 56


        PSEUDOMONAS AERUGINOSA


        ANTIBIOTIC                        RESULT          INTERPRETATION


        AMIKACIN                          <=16                  S


        AZTREONAM                         >16                   R


        CEFTAZIDIME                       >16                   R


        CIPROFLOXACIN                     <=0.25                S


        CEFEPIME                          16                    I


        CEFTAZIDIME/AVIBACTAM             <=4                   S


        GENTAMICIN                        <=2                   S














                               ** CONTINUED ON NEXT PAGE **





-----------

--------------------------------------------------------------------------------


-





RUN DATE: 06/11/20                  [facility] *LIVE*               

  PAGE 2   


RUN TIME: 1016                            Specimen Inquiry                    


-

--------------------------------------------------------------------------------


-----------





SPEC: [id number]    PATIENT: JESENIA BEAN                [id number]  

(Continued)


--------------------------------------------------------------------------

------------------








 

--------------------------------------------------------------------------------


------------





  Procedure                         Result                                      

         


--------------------------------------------------

------------------------------------------





  ANTIMICROBIAL SUSCEPTIBILITY  Preliminary   (continued)


        LEVOFLOXACIN                      <=0.5                 S


        MEROPENEM                         <=1                   S


        PIPERACILLIN/TAZOBACTAM           64                    S


        TOBRAMYCIN                        <=2                   S


        Unless otherwise specified, Testing Performed by:


        [address] [address]


        For Inquires, the Physician may contact the Microbiology


        department at [phone]











CT Scan 6/6





IMPRESSION:


1. Removal of the percutaneous pigtail drainage catheters since the prior 


exam. Sequela of pancreatitis with extensive pseudocysts again 


demonstrated, the right-sided collections are slightly larger since the 


prior exam, the left-sided collections are stable. See above.


2. Moderate to large left pleural effusion with atelectasis and collapse 


of most of the left lower lobe, stable. Small right pleural effusion is 


stable.


3. Gallstone.





Objective


Assessment


Patient with prolonged hospitalization more than 3 months


Multiple medical problems


Multiple surgical procedures





Off Versed 7/4


S/P Exp. Lap, REN, subtotal cholecystectomy with cholangiogram, G-J tube 

placement & pancreatic necrosectomy on June 30 YEAST/PSA 


Leukocytosis - mild increase today but all parameters are up


Fever intermittently source likely GI


Severe protein malnutrition


Acute gallstone pancreatitis with persistent necrosis


  -CT a/p 4/9.  Increased ascites. Persistent evidence of necrotizing 

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


           - 4/27. status post ROBERT drain placement; C. parapsilosis. s/p drain 

5/6 + yeast & high amylase; s/p additional drain on 5/8. Drains removed. 


           -5/6. fluid  candida parapsilosis fluid, amylase high


           - 6/6 showed multiple pseudocysts, slight larger on the right. s/p 

drains x 3, 6/7.  + PSAE (MDRO-R Cefepime, Zosyn ANSON < 64) and yeast, 


           -6/7 s/p drain replacement x 3; fluid cult PSAE (MDRO), yeast; 

treated


Ascites s/p paracentesis 4/15 & 5/6. C. parapsilosis 


Cholelithiasis with thickening of the gallbladder wall.


JED, Hyperkalemia, Metabolic acidosis off dialysis


Acute hypoxic resp failure. trach/vent. sputum 6/13  + PSAE (I merrem) 


Pleural effusions s/p left thoracentesis, 5/12. no culture. s/p left chest tube,

6/15 no growth


Hypocalcemia 


Prediabetes


HTN


Anemia s/p RBCs





Plan


Plan of Care





PSA from 6/30 I to Meropenem but WBC stable so will try to hold changing abx to 

Cipro and or Avycaz to try and save potential abx options


continue merrem and micafungin but d/c Dapto 7/7


Monitor labs in am


f/u cultures/susceptibilities still pending 


wound care /drain management as directed











Contact isolation for CRE/MDRO


Critically ill





D/w nursing











RASHAWN ROSEN MD               Jul 8, 2020 07:16

## 2020-07-08 NOTE — PDOC
G I PROGRESS NOTE


Subjective


Asleep on ventilator.  Did not awaken.


Physical Exam


Trached


Tachy.


Abdomen with multiple drains.  Output from larger drains shows downward trend.





Path noted.


Review of Relevant


I have reviewed the following items josy (where applicable) has been applied.


Labs





Laboratory Tests








Test


 7/6/20


11:56 7/6/20


16:51 7/7/20


00:00 7/7/20


05:20


 


Glucose (Fingerstick)


 154 mg/dL


(70-99) 153 mg/dL


(70-99) 123 mg/dL


(70-99) 





 


White Blood Count


 


 


 


 15.0 x10^3/uL


(4.0-11.0)


 


Red Blood Count


 


 


 


 2.54 x10^6/uL


(3.50-5.40)


 


Hemoglobin


 


 


 


 7.5 g/dL


(12.0-15.5)


 


Hematocrit


 


 


 


 22.5 %


(36.0-47.0)


 


Mean Corpuscular Volume    89 fL () 


 


Mean Corpuscular Hemoglobin    30 pg (25-35) 


 


Mean Corpuscular Hemoglobin


Concent 


 


 


 33 g/dL


(31-37)


 


Red Cell Distribution Width


 


 


 


 14.8 %


(11.5-14.5)


 


Platelet Count


 


 


 


 414 x10^3/uL


(140-400)


 


Neutrophils (%) (Auto)    86 % (31-73) 


 


Lymphocytes (%) (Auto)    7 % (24-48) 


 


Monocytes (%) (Auto)    6 % (0-9) 


 


Eosinophils (%) (Auto)    1 % (0-3) 


 


Basophils (%) (Auto)    0 % (0-3) 


 


Neutrophils # (Auto)


 


 


 


 12.9 x10^3/uL


(1.8-7.7)


 


Lymphocytes # (Auto)


 


 


 


 1.0 x10^3/uL


(1.0-4.8)


 


Monocytes # (Auto)


 


 


 


 0.9 x10^3/uL


(0.0-1.1)


 


Eosinophils # (Auto)


 


 


 


 0.2 x10^3/uL


(0.0-0.7)


 


Basophils # (Auto)


 


 


 


 0.0 x10^3/uL


(0.0-0.2)


 


Test


 7/7/20


05:32 7/7/20


12:21 7/7/20


17:50 7/8/20


00:45


 


Glucose (Fingerstick)


 159 mg/dL


(70-99) 164 mg/dL


(70-99) 135 mg/dL


(70-99) 186 mg/dL


(70-99)


 


Test


 7/8/20


06:20 7/8/20


06:24 


 





 


White Blood Count


 15.4 x10^3/uL


(4.0-11.0) 


 


 





 


Red Blood Count


 2.95 x10^6/uL


(3.50-5.40) 


 


 





 


Hemoglobin


 8.7 g/dL


(12.0-15.5) 


 


 





 


Hematocrit


 26.3 %


(36.0-47.0) 


 


 





 


Mean Corpuscular Volume 89 fL ()    


 


Mean Corpuscular Hemoglobin 30 pg (25-35)    


 


Mean Corpuscular Hemoglobin


Concent 33 g/dL


(31-37) 


 


 





 


Red Cell Distribution Width


 15.1 %


(11.5-14.5) 


 


 





 


Platelet Count


 597 x10^3/uL


(140-400) 


 


 





 


Neutrophils (%) (Auto) 83 % (31-73)    


 


Lymphocytes (%) (Auto) 9 % (24-48)    


 


Monocytes (%) (Auto) 7 % (0-9)    


 


Eosinophils (%) (Auto) 1 % (0-3)    


 


Basophils (%) (Auto) 0 % (0-3)    


 


Neutrophils # (Auto)


 12.8 x10^3/uL


(1.8-7.7) 


 


 





 


Lymphocytes # (Auto)


 1.4 x10^3/uL


(1.0-4.8) 


 


 





 


Monocytes # (Auto)


 1.0 x10^3/uL


(0.0-1.1) 


 


 





 


Eosinophils # (Auto)


 0.2 x10^3/uL


(0.0-0.7) 


 


 





 


Basophils # (Auto)


 0.0 x10^3/uL


(0.0-0.2) 


 


 





 


Sodium Level


 143 mmol/L


(136-145) 


 


 





 


Potassium Level


 4.2 mmol/L


(3.5-5.1) 


 


 





 


Chloride Level


 109 mmol/L


() 


 


 





 


Carbon Dioxide Level


 31 mmol/L


(21-32) 


 


 





 


Anion Gap 3 (6-14)    


 


Blood Urea Nitrogen


 15 mg/dL


(7-20) 


 


 





 


Creatinine


 0.5 mg/dL


(0.6-1.0) 


 


 





 


Estimated GFR


(Cockcroft-Gault) 131.1 


 


 


 





 


BUN/Creatinine Ratio 30 (6-20)    


 


Glucose Level


 139 mg/dL


(70-99) 


 


 





 


Calcium Level


 10.2 mg/dL


(8.5-10.1) 


 


 





 


Total Bilirubin


 0.2 mg/dL


(0.2-1.0) 


 


 





 


Aspartate Amino Transf


(AST/SGOT) 25 U/L (15-37) 


 


 


 





 


Alanine Aminotransferase


(ALT/SGPT) 37 U/L (14-59) 


 


 


 





 


Alkaline Phosphatase


 239 U/L


() 


 


 





 


Total Protein


 5.1 g/dL


(6.4-8.2) 


 


 





 


Albumin


 1.1 g/dL


(3.4-5.0) 


 


 





 


Albumin/Globulin Ratio 0.3 (1.0-1.7)    


 


Glucose (Fingerstick)


 


 134 mg/dL


(70-99) 


 











Laboratory Tests








Test


 7/7/20


12:21 7/7/20


17:50 7/8/20


00:45 7/8/20


06:20


 


Glucose (Fingerstick)


 164 mg/dL


(70-99) 135 mg/dL


(70-99) 186 mg/dL


(70-99) 





 


White Blood Count


 


 


 


 15.4 x10^3/uL


(4.0-11.0)


 


Red Blood Count


 


 


 


 2.95 x10^6/uL


(3.50-5.40)


 


Hemoglobin


 


 


 


 8.7 g/dL


(12.0-15.5)


 


Hematocrit


 


 


 


 26.3 %


(36.0-47.0)


 


Mean Corpuscular Volume    89 fL () 


 


Mean Corpuscular Hemoglobin    30 pg (25-35) 


 


Mean Corpuscular Hemoglobin


Concent 


 


 


 33 g/dL


(31-37)


 


Red Cell Distribution Width


 


 


 


 15.1 %


(11.5-14.5)


 


Platelet Count


 


 


 


 597 x10^3/uL


(140-400)


 


Neutrophils (%) (Auto)    83 % (31-73) 


 


Lymphocytes (%) (Auto)    9 % (24-48) 


 


Monocytes (%) (Auto)    7 % (0-9) 


 


Eosinophils (%) (Auto)    1 % (0-3) 


 


Basophils (%) (Auto)    0 % (0-3) 


 


Neutrophils # (Auto)


 


 


 


 12.8 x10^3/uL


(1.8-7.7)


 


Lymphocytes # (Auto)


 


 


 


 1.4 x10^3/uL


(1.0-4.8)


 


Monocytes # (Auto)


 


 


 


 1.0 x10^3/uL


(0.0-1.1)


 


Eosinophils # (Auto)


 


 


 


 0.2 x10^3/uL


(0.0-0.7)


 


Basophils # (Auto)


 


 


 


 0.0 x10^3/uL


(0.0-0.2)


 


Sodium Level


 


 


 


 143 mmol/L


(136-145)


 


Potassium Level


 


 


 


 4.2 mmol/L


(3.5-5.1)


 


Chloride Level


 


 


 


 109 mmol/L


()


 


Carbon Dioxide Level


 


 


 


 31 mmol/L


(21-32)


 


Anion Gap    3 (6-14) 


 


Blood Urea Nitrogen


 


 


 


 15 mg/dL


(7-20)


 


Creatinine


 


 


 


 0.5 mg/dL


(0.6-1.0)


 


Estimated GFR


(Cockcroft-Gault) 


 


 


 131.1 





 


BUN/Creatinine Ratio    30 (6-20) 


 


Glucose Level


 


 


 


 139 mg/dL


(70-99)


 


Calcium Level


 


 


 


 10.2 mg/dL


(8.5-10.1)


 


Total Bilirubin


 


 


 


 0.2 mg/dL


(0.2-1.0)


 


Aspartate Amino Transf


(AST/SGOT) 


 


 


 25 U/L (15-37) 





 


Alanine Aminotransferase


(ALT/SGPT) 


 


 


 37 U/L (14-59) 





 


Alkaline Phosphatase


 


 


 


 239 U/L


()


 


Total Protein


 


 


 


 5.1 g/dL


(6.4-8.2)


 


Albumin


 


 


 


 1.1 g/dL


(3.4-5.0)


 


Albumin/Globulin Ratio    0.3 (1.0-1.7) 


 


Test


 7/8/20


06:24 


 


 





 


Glucose (Fingerstick)


 134 mg/dL


(70-99) 


 


 











Microbiology


6/30/20 Gram Stain - Final, Complete


          


6/30/20 Aerobic and Anaerobic Culture - Final, Complete


          


6/30/20 Antimicrobic Susceptibility - Final, Complete


          


6/28/20 Blood Culture - Final, Complete


          NO GROWTH AFTER 5 DAYS


6/15/20 Gram Stain - Final, Complete


          


6/15/20 Aerobic and Anaerobic Culture - Final, Complete


          


6/13/20 Gram Stain Evaluation - Final, Complete


          


6/13/20 Respiratory Culture - Final, Complete


          


6/13/20 Antimicrobic Susceptibility - Final, Complete


          


6/7/20 Urine Culture - Final, Complete


         


5/30/20 Gram Stain - Final, Complete


          


5/30/20 Aerobic Culture - Final, Complete


Vitals/I & O





Vital Sign - Last 24 Hours








 7/7/20 7/7/20 7/7/20 7/7/20





 12:00 12:00 12:05 13:00


 


Temp 96.9   





 96.9   


 


Pulse 112   107


 


Resp 14   14


 


B/P (MAP) 122/79 (93)   111/66 (81)


 


Pulse Ox 99  100 100


 


O2 Delivery Ventilator Mechanical Ventilator Ventilator Ventilator


 


    





    





 7/7/20 7/7/20 7/7/20 7/7/20





 14:00 15:00 15:16 16:00


 


Pulse 103 104  


 


Resp 14 14  


 


B/P (MAP) 119/76 (90) 119/66 (83)  


 


Pulse Ox 100 99 99 


 


O2 Delivery Ventilator Ventilator Ventilator Mechanical Ventilator





 7/7/20 7/7/20 7/7/20 7/7/20





 16:00 17:00 18:00 18:10


 


Temp 97.8   





 97.8   


 


Pulse 107 102 97 


 


Resp 14 26 24 


 


B/P (MAP) 110/64 (79) 99/54 (69) 95/52 (66) 


 


Pulse Ox 99 98 99 100


 


O2 Delivery Ventilator Ventilator Ventilator Ventilator


 


    





    





 7/7/20 7/7/20 7/7/20 7/7/20





 19:00 20:00 20:00 20:21


 


Temp  98.5  





  98.5  


 


Pulse 103 107  


 


Resp 18 21  


 


B/P (MAP) 104/55 (71) 111/70 (84)  


 


Pulse Ox 99 99  99


 


O2 Delivery Ventilator Ventilator Mechanical Ventilator Ventilator


 


    





    





 7/7/20 7/7/20 7/7/20 7/7/20





 21:00 22:00 23:00 23:43


 


Pulse 112 114 114 


 


Resp 17 17 22 


 


B/P (MAP) 131/81 (98) 127/74 (91) 139/55 (83) 


 


Pulse Ox 99 99 100 99


 


O2 Delivery Ventilator Ventilator Ventilator Ventilator





 7/8/20 7/8/20 7/8/20 7/8/20





 00:00 00:00 01:00 02:00


 


Temp 98.3   





 98.3   


 


Pulse 103  109 112


 


Resp 24  24 18


 


B/P (MAP) 107/57 (74)  135/77 (96) 123/77 (92)


 


Pulse Ox 99  99 100


 


O2 Delivery Ventilator Mechanical Ventilator Ventilator Ventilator


 


    





    





 7/8/20 7/8/20 7/8/20 7/8/20





 03:00 03:53 04:00 04:00


 


Temp    98.7





    98.7


 


Pulse 112   112


 


Resp 18   18


 


B/P (MAP) 123/77 (92)   133/74 (93)


 


Pulse Ox 100 100  100


 


O2 Delivery Ventilator Ventilator Mechanical Ventilator Ventilator


 


    





    





 7/8/20 7/8/20 7/8/20 7/8/20





 05:00 06:00 07:00 08:00


 


Pulse 112 107 109 


 


Resp 23 20 16 


 


B/P (MAP) 134/81 (98) 136/70 (92) 140/81 (100) 


 


Pulse Ox 100 100 100 


 


O2 Delivery Ventilator Ventilator Ventilator Mechanical Ventilator





 7/8/20 7/8/20 7/8/20 7/8/20





 08:00 08:27 09:00 10:00


 


Temp 99.4   





 99.4   


 


Pulse 116  115 110


 


Resp 18  24 45


 


B/P (MAP) 149/94 (112)  135/67 (89) 126/69 (88)


 


Pulse Ox 100 100 100 100


 


O2 Delivery Ventilator Ventilator Ventilator Ventilator














Intake and Output   


 


 7/7/20 7/7/20 7/8/20





 15:00 23:00 07:00


 


Intake Total 270 ml 2141.9 ml 833.7 ml


 


Output Total 1370 ml 1310 ml 1170 ml


 


Balance -1100 ml 831.9 ml -336.3 ml








Problem List


Problems


Medical Problems:


(1) Acute pancreatitis


Status: Acute  





(2) Cholelithiasis


Status: Acute  





Assessment


Biliary pancreatitis, post necrosectomy/naif.  Remains critically ill.


Plan of Care Note


Continue support.





Justicifation of Admission Dx:


Justifications for Admission:


Justification of Admission Dx:  Yes











MARCO ANTONIO LONGO MD           Jul 8, 2020 11:10

## 2020-07-08 NOTE — NUR
Pharmacy TPN Dosing Note



S: JESENIA BEAN is a 49 year old F Currently receiving Central Continuous TPN started 
03/18/20



B:Pertinent PMH: 

Necrotizing pancreatitis

Height: 5 feet, 8 inches

Weight: 95.0 kg



Current diet: NPO 



LABS:

Sodium:    143 

Potassium: 1.2 

Chloride:  109 

Calcium:   10.2 

Corrected Calcium: 12.68 

Magnesium: 1.8 

CO2:       31 

SCr:       0.6 

Glucose:   134-186 

Albumin:   0.9 

AST:       25 

ALT:       37 



TPN FORMULA:

TPN TYPE:  Central Continuous

AMINO ACIDS:         80 gm

DEXTROSE:            250 gm

LIPIDS:              20 gm

SODIUM CHLORIDE:     110 mEq

SODIUM ACETATE:      - mEq

SODIUM PHOSPHATE:    - mmol

POTASSIUM CHLORIDE:  30 mEq

POTASSIUM ACETATE:   30 mEq

POTASSIUM PHOSPHATE: - mmol

MAGNESIUM:           15 mEq

CALCIUM:             - mEq

INSULIN:             15 units

MULTIPLE VITAMIN:    10 ml

TRACE ELEMENTS:      1 ml ml(s)



TPN PLAN:  

Labs stable. Per dietary rec, stop TPN when TF up to 45 ml/hr. Currently

at 40 ml/hr. BMP in AM.





R: Continue TPN AS ABOVE.

Will monitor electrolytes, glucose, and tolerance to TPN.



 CANDACE BELTRE [facility], 07/08/20 [id number]

## 2020-07-08 NOTE — PDOC
PROGRESS NOTES


Chief Complaint


Chief Complaint


A/P


Acute hypoxic Respiratory failure required  mechanical ventilation


Tracheostomy


bilateral pleural effusions/pulm edema s/p Throacentesis on 6/15/2020


Severe Acute gallstone pancreatitis (not a surgical candidate at this time) with

necrosis


Acute kidney failure now requiring dialysis


Gallstones (Calculus of gallbladder with acute cholecystitis without 

obstruction)


HTN 


Intractable pain


Intractable nausea


Covid 19 negative. 


Acute on chronic anemia 


EEG: No seizure activityFever  - better currently - intermittent could be from 

underlying pancreatitis blood cults 5/4 - neg so far


? Ileus with vomiting


Abd distention - U/S and CT reviewed s/p 0.4 L of opaque, debris-containing 

ascites was removed 5/6


Acute pancreatitis with persistent necrosis


Gallstone pancreatitis with necrosis. 


   -CT A/P 6/6 showed multiple pseudocysts, slight larger on the right. s/p 

drains x 3, 6/7.  + PSAE (MDRO-R Cefepime, Zosyn ANSON < 64) and yeast, 


   -s/p drain 4/27. C. parapsilosis. s/p drain 5/6 + yeast & high amylase; s/p 

additional drain on 5/8. Drains removed. 


Ascites s/p paracentesis 4/15 & 5/6. C. parapsilosis 


JED. off HD. 


A large fluid collection in the pancreatic bed has slightly decreased in size, 

described below, the pancreas itself is difficult to  visualize, which could be 

due to necrosis or obscuration of pancreatic  parenchyma from the surrounding 

fluid collection.6/15 


- 4/27 status post ROBERT drain placement + C paropsilosis. s/p additional drains 

5/8


Anemia - S/p PRBCs


Cholelithiasis with thickening of the gallbladder wall.


Leucocytosis improving


JED, hyperkalemia, Metabolic acidosis off dialysis


hypocalcemia 


Prediabetes


HTN


s/p trach


ESRD on HD


Hyperglycemia


severe protein-caloric malnutrition


Moderate to large left pleural effusion with atelectasis and collapse  of most 

of the left lower lobe, stable


 


Dispo - ICU, critically ill


Poor prognosis





History of Present Illness


History of Present Illness


6/8: IR placed drain on 6/7. 4u PRBC after Hb drop. Hb 8.8 today. Off Levophed 

this morning. T-max 100.3. Much more lethargic today. CXR with left sided 

diffuse infiltrates.


6/9: Tachycardic overnight into the 140s. NGT clamped. On BIPAP currently. 

Drains with serosanguinous discharge. WBC 8, Tmax 99.6F.


6/10: Seen on trach shield in ICU.  Hypertensive and tachycardic. Labs stable. 

blood stained drainage from drains. Afebrile.


6/11: Seen on trach shield in ICU. She is a bit confused, drowsy, but when 

sitting up is conversational and confusion somewhat clears. She is asking for 

more pain medication. Stable drains, still very tachy.Na 147


6/12: Patient vomited overnight. Aspirated. Tried to pull her trach out, she was

told she would die without her trach, she said "I know, I just want to go home".

Hb 7.6. Afebrile, still very tachycardic. 1055ml out of right sided ROBERT drain


6/13: Overnight hypoxic, on BIPAP. CXR with left sided white out lung. 

Significant mucous plug suctioned by RT with improvement in her ABG after 2 

hours this morning. Not really active, tired, lethargic. 890ml out of drains 

past 24 hours. On vent. D/w daughter bedside.


6/29: To OR for pancreatic necrosectomy, cholecystectomy, lysis of adhesions, 

gastrostomy tube placement





7/7,  awake on vent, weaning sedation, cont other, still with marked drainage





Still on fentanyl. WBC 15.4, Hb 8.7 Metabolic panel WNL except calcium 10.2 with

albumin 1.1. She awakens off sedation, still connected to vent currently.


 


prognosis still poor, but improving slowly





Vitals


Vitals





Vital Signs








  Date Time  Temp Pulse Resp B/P (MAP) Pulse Ox O2 Delivery O2 Flow Rate FiO2


 


7/8/20 08:27     100 Ventilator  


 


7/8/20 07:00  109 16 140/81 (100)    


 


7/8/20 04:00 98.7       





 98.7       











Physical Exam


Physical Exam


GENERAL: Alert, NAD tired appearing


HEENT: Pupils equal, oral cavity dry. + NGT


NECK:  Tracheostomy 


LUNGS: Diminished aeration bases,  CT on left 


HEART:  S1, S2, regular w/ PVCs 


ABDOMEN: Sightly Distended,  bowel sounds hypoactive, soft, richardson x 2, 3 ROBERT 

drains, G-J tube and + wound vac 


: [place] in place 


EXTREMITIES: Generalized edema, no cyanosis. SCDs & Podus boots bilaterally  


SKIN: warm touch. No signs of rash.  


LUE-PICC without signs of complications 


LUE art-line out, mottling left forearm about ole art-line site is improving. RP

palpable, cap refill brisk.


NEURO: alert and some responsive -  tracking


General:  Alert, No acute distress


Heart:  Regular rate (SR/ST), Other (distant heart sounds)


Lungs:  Crackles


Abdomen:  Soft, Other (tubes in place, G-tube portion with bilious output, santosh J

tube feeds, various drains with decreasing output)


Extremities:  Other (Diffuse edema)


Skin:  No rashes, No significant lesion





Labs


LABS





Laboratory Tests








Test


 7/7/20


12:21 7/7/20


17:50 7/8/20


00:45 7/8/20


06:20


 


Glucose (Fingerstick)


 164 mg/dL


(70-99) 135 mg/dL


(70-99) 186 mg/dL


(70-99) 





 


White Blood Count


 


 


 


 15.4 x10^3/uL


(4.0-11.0)


 


Red Blood Count


 


 


 


 2.95 x10^6/uL


(3.50-5.40)


 


Hemoglobin


 


 


 


 8.7 g/dL


(12.0-15.5)


 


Hematocrit


 


 


 


 26.3 %


(36.0-47.0)


 


Mean Corpuscular Volume    89 fL () 


 


Mean Corpuscular Hemoglobin    30 pg (25-35) 


 


Mean Corpuscular Hemoglobin


Concent 


 


 


 33 g/dL


(31-37)


 


Red Cell Distribution Width


 


 


 


 15.1 %


(11.5-14.5)


 


Platelet Count


 


 


 


 597 x10^3/uL


(140-400)


 


Neutrophils (%) (Auto)    83 % (31-73) 


 


Lymphocytes (%) (Auto)    9 % (24-48) 


 


Monocytes (%) (Auto)    7 % (0-9) 


 


Eosinophils (%) (Auto)    1 % (0-3) 


 


Basophils (%) (Auto)    0 % (0-3) 


 


Neutrophils # (Auto)


 


 


 


 12.8 x10^3/uL


(1.8-7.7)


 


Lymphocytes # (Auto)


 


 


 


 1.4 x10^3/uL


(1.0-4.8)


 


Monocytes # (Auto)


 


 


 


 1.0 x10^3/uL


(0.0-1.1)


 


Eosinophils # (Auto)


 


 


 


 0.2 x10^3/uL


(0.0-0.7)


 


Basophils # (Auto)


 


 


 


 0.0 x10^3/uL


(0.0-0.2)


 


Sodium Level


 


 


 


 143 mmol/L


(136-145)


 


Potassium Level


 


 


 


 4.2 mmol/L


(3.5-5.1)


 


Chloride Level


 


 


 


 109 mmol/L


()


 


Carbon Dioxide Level


 


 


 


 31 mmol/L


(21-32)


 


Anion Gap    3 (6-14) 


 


Blood Urea Nitrogen


 


 


 


 15 mg/dL


(7-20)


 


Creatinine


 


 


 


 0.5 mg/dL


(0.6-1.0)


 


Estimated GFR


(Cockcroft-Gault) 


 


 


 131.1 





 


BUN/Creatinine Ratio    30 (6-20) 


 


Glucose Level


 


 


 


 139 mg/dL


(70-99)


 


Calcium Level


 


 


 


 10.2 mg/dL


(8.5-10.1)


 


Total Bilirubin


 


 


 


 0.2 mg/dL


(0.2-1.0)


 


Aspartate Amino Transf


(AST/SGOT) 


 


 


 25 U/L (15-37) 





 


Alanine Aminotransferase


(ALT/SGPT) 


 


 


 37 U/L (14-59) 





 


Alkaline Phosphatase


 


 


 


 239 U/L


()


 


Total Protein


 


 


 


 5.1 g/dL


(6.4-8.2)


 


Albumin


 


 


 


 1.1 g/dL


(3.4-5.0)


 


Albumin/Globulin Ratio    0.3 (1.0-1.7) 


 


Test


 7/8/20


06:24 


 


 





 


Glucose (Fingerstick)


 134 mg/dL


(70-99) 


 


 














Assessment and Plan


Assessmemt and Plan


Problems


Medical Problems:


(1) Acute pancreatitis


Status: Acute  





(2) Cholelithiasis


Status: Acute  











Comment


Review of Relevant


I have reviewed the following items josy (where applicable) has been applied.


Labs





Laboratory Tests








Test


 7/6/20


11:56 7/6/20


16:51 7/7/20


00:00 7/7/20


05:20


 


Glucose (Fingerstick)


 154 mg/dL


(70-99) 153 mg/dL


(70-99) 123 mg/dL


(70-99) 





 


White Blood Count


 


 


 


 15.0 x10^3/uL


(4.0-11.0)


 


Red Blood Count


 


 


 


 2.54 x10^6/uL


(3.50-5.40)


 


Hemoglobin


 


 


 


 7.5 g/dL


(12.0-15.5)


 


Hematocrit


 


 


 


 22.5 %


(36.0-47.0)


 


Mean Corpuscular Volume    89 fL () 


 


Mean Corpuscular Hemoglobin    30 pg (25-35) 


 


Mean Corpuscular Hemoglobin


Concent 


 


 


 33 g/dL


(31-37)


 


Red Cell Distribution Width


 


 


 


 14.8 %


(11.5-14.5)


 


Platelet Count


 


 


 


 414 x10^3/uL


(140-400)


 


Neutrophils (%) (Auto)    86 % (31-73) 


 


Lymphocytes (%) (Auto)    7 % (24-48) 


 


Monocytes (%) (Auto)    6 % (0-9) 


 


Eosinophils (%) (Auto)    1 % (0-3) 


 


Basophils (%) (Auto)    0 % (0-3) 


 


Neutrophils # (Auto)


 


 


 


 12.9 x10^3/uL


(1.8-7.7)


 


Lymphocytes # (Auto)


 


 


 


 1.0 x10^3/uL


(1.0-4.8)


 


Monocytes # (Auto)


 


 


 


 0.9 x10^3/uL


(0.0-1.1)


 


Eosinophils # (Auto)


 


 


 


 0.2 x10^3/uL


(0.0-0.7)


 


Basophils # (Auto)


 


 


 


 0.0 x10^3/uL


(0.0-0.2)


 


Test


 7/7/20


05:32 7/7/20


12:21 7/7/20


17:50 7/8/20


00:45


 


Glucose (Fingerstick)


 159 mg/dL


(70-99) 164 mg/dL


(70-99) 135 mg/dL


(70-99) 186 mg/dL


(70-99)


 


Test


 7/8/20


06:20 7/8/20


06:24 


 





 


White Blood Count


 15.4 x10^3/uL


(4.0-11.0) 


 


 





 


Red Blood Count


 2.95 x10^6/uL


(3.50-5.40) 


 


 





 


Hemoglobin


 8.7 g/dL


(12.0-15.5) 


 


 





 


Hematocrit


 26.3 %


(36.0-47.0) 


 


 





 


Mean Corpuscular Volume 89 fL ()    


 


Mean Corpuscular Hemoglobin 30 pg (25-35)    


 


Mean Corpuscular Hemoglobin


Concent 33 g/dL


(31-37) 


 


 





 


Red Cell Distribution Width


 15.1 %


(11.5-14.5) 


 


 





 


Platelet Count


 597 x10^3/uL


(140-400) 


 


 





 


Neutrophils (%) (Auto) 83 % (31-73)    


 


Lymphocytes (%) (Auto) 9 % (24-48)    


 


Monocytes (%) (Auto) 7 % (0-9)    


 


Eosinophils (%) (Auto) 1 % (0-3)    


 


Basophils (%) (Auto) 0 % (0-3)    


 


Neutrophils # (Auto)


 12.8 x10^3/uL


(1.8-7.7) 


 


 





 


Lymphocytes # (Auto)


 1.4 x10^3/uL


(1.0-4.8) 


 


 





 


Monocytes # (Auto)


 1.0 x10^3/uL


(0.0-1.1) 


 


 





 


Eosinophils # (Auto)


 0.2 x10^3/uL


(0.0-0.7) 


 


 





 


Basophils # (Auto)


 0.0 x10^3/uL


(0.0-0.2) 


 


 





 


Sodium Level


 143 mmol/L


(136-145) 


 


 





 


Potassium Level


 4.2 mmol/L


(3.5-5.1) 


 


 





 


Chloride Level


 109 mmol/L


() 


 


 





 


Carbon Dioxide Level


 31 mmol/L


(21-32) 


 


 





 


Anion Gap 3 (6-14)    


 


Blood Urea Nitrogen


 15 mg/dL


(7-20) 


 


 





 


Creatinine


 0.5 mg/dL


(0.6-1.0) 


 


 





 


Estimated GFR


(Cockcroft-Gault) 131.1 


 


 


 





 


BUN/Creatinine Ratio 30 (6-20)    


 


Glucose Level


 139 mg/dL


(70-99) 


 


 





 


Calcium Level


 10.2 mg/dL


(8.5-10.1) 


 


 





 


Total Bilirubin


 0.2 mg/dL


(0.2-1.0) 


 


 





 


Aspartate Amino Transf


(AST/SGOT) 25 U/L (15-37) 


 


 


 





 


Alanine Aminotransferase


(ALT/SGPT) 37 U/L (14-59) 


 


 


 





 


Alkaline Phosphatase


 239 U/L


() 


 


 





 


Total Protein


 5.1 g/dL


(6.4-8.2) 


 


 





 


Albumin


 1.1 g/dL


(3.4-5.0) 


 


 





 


Albumin/Globulin Ratio 0.3 (1.0-1.7)    


 


Glucose (Fingerstick)


 


 134 mg/dL


(70-99) 


 











Laboratory Tests








Test


 7/7/20


12:21 7/7/20


17:50 7/8/20


00:45 7/8/20


06:20


 


Glucose (Fingerstick)


 164 mg/dL


(70-99) 135 mg/dL


(70-99) 186 mg/dL


(70-99) 





 


White Blood Count


 


 


 


 15.4 x10^3/uL


(4.0-11.0)


 


Red Blood Count


 


 


 


 2.95 x10^6/uL


(3.50-5.40)


 


Hemoglobin


 


 


 


 8.7 g/dL


(12.0-15.5)


 


Hematocrit


 


 


 


 26.3 %


(36.0-47.0)


 


Mean Corpuscular Volume    89 fL () 


 


Mean Corpuscular Hemoglobin    30 pg (25-35) 


 


Mean Corpuscular Hemoglobin


Concent 


 


 


 33 g/dL


(31-37)


 


Red Cell Distribution Width


 


 


 


 15.1 %


(11.5-14.5)


 


Platelet Count


 


 


 


 597 x10^3/uL


(140-400)


 


Neutrophils (%) (Auto)    83 % (31-73) 


 


Lymphocytes (%) (Auto)    9 % (24-48) 


 


Monocytes (%) (Auto)    7 % (0-9) 


 


Eosinophils (%) (Auto)    1 % (0-3) 


 


Basophils (%) (Auto)    0 % (0-3) 


 


Neutrophils # (Auto)


 


 


 


 12.8 x10^3/uL


(1.8-7.7)


 


Lymphocytes # (Auto)


 


 


 


 1.4 x10^3/uL


(1.0-4.8)


 


Monocytes # (Auto)


 


 


 


 1.0 x10^3/uL


(0.0-1.1)


 


Eosinophils # (Auto)


 


 


 


 0.2 x10^3/uL


(0.0-0.7)


 


Basophils # (Auto)


 


 


 


 0.0 x10^3/uL


(0.0-0.2)


 


Sodium Level


 


 


 


 143 mmol/L


(136-145)


 


Potassium Level


 


 


 


 4.2 mmol/L


(3.5-5.1)


 


Chloride Level


 


 


 


 109 mmol/L


()


 


Carbon Dioxide Level


 


 


 


 31 mmol/L


(21-32)


 


Anion Gap    3 (6-14) 


 


Blood Urea Nitrogen


 


 


 


 15 mg/dL


(7-20)


 


Creatinine


 


 


 


 0.5 mg/dL


(0.6-1.0)


 


Estimated GFR


(Cockcroft-Gault) 


 


 


 131.1 





 


BUN/Creatinine Ratio    30 (6-20) 


 


Glucose Level


 


 


 


 139 mg/dL


(70-99)


 


Calcium Level


 


 


 


 10.2 mg/dL


(8.5-10.1)


 


Total Bilirubin


 


 


 


 0.2 mg/dL


(0.2-1.0)


 


Aspartate Amino Transf


(AST/SGOT) 


 


 


 25 U/L (15-37) 





 


Alanine Aminotransferase


(ALT/SGPT) 


 


 


 37 U/L (14-59) 





 


Alkaline Phosphatase


 


 


 


 239 U/L


()


 


Total Protein


 


 


 


 5.1 g/dL


(6.4-8.2)


 


Albumin


 


 


 


 1.1 g/dL


(3.4-5.0)


 


Albumin/Globulin Ratio    0.3 (1.0-1.7) 


 


Test


 7/8/20


06:24 


 


 





 


Glucose (Fingerstick)


 134 mg/dL


(70-99) 


 


 











Microbiology


6/30/20 Gram Stain - Final, Complete


          


6/30/20 Aerobic and Anaerobic Culture - Final, Complete


          


6/30/20 Antimicrobic Susceptibility - Final, Complete


          


6/28/20 Blood Culture - Final, Complete


          NO GROWTH AFTER 5 DAYS


6/15/20 Gram Stain - Final, Complete


          


6/15/20 Aerobic and Anaerobic Culture - Final, Complete


          


6/13/20 Gram Stain Evaluation - Final, Complete


          


6/13/20 Respiratory Culture - Final, Complete


          


6/13/20 Antimicrobic Susceptibility - Final, Complete


          


6/7/20 Urine Culture - Final, Complete


         


5/30/20 Gram Stain - Final, Complete


          


5/30/20 Aerobic Culture - Final, Complete


Medications





Current Medications


Sodium Chloride 1,000 ml @  1,000 mls/hr Q1H IV  Last administered on 3/16/20at 

03:00;  Start 3/16/20 at 03:00;  Stop 3/16/20 at 03:59;  Status DC


Ondansetron HCl (Zofran) 4 mg 1X  ONCE IVP  Last administered on 3/16/20at 

03:27;  Start 3/16/20 at 03:00;  Stop 3/16/20 at 03:01;  Status DC


Morphine Sulfate (Morphine Sulfate) 4 mg 1X  ONCE IV ;  Start 3/16/20 at 03:00; 

Stop 3/16/20 at 03:01;  Status Cancel


Ketorolac Tromethamine (Toradol 30mg Vial) 30 mg 1X  ONCE IV  Last administered 

on 3/16/20at 02:54;  Start 3/16/20 at 03:00;  Stop 3/16/20 at 03:01;  Status DC


Fentanyl Citrate (Fentanyl 2ml Vial) 25 mcg 1X  ONCE IVP  Last administered on 

3/16/20at 03:23;  Start 3/16/20 at 03:30;  Stop 3/16/20 at 03:31;  Status DC


Fentanyl Citrate (Fentanyl 2ml Vial) 100 mcg STK-MED ONCE .ROUTE ;  Start 

3/16/20 at 03:18;  Stop 3/16/20 at 03:18;  Status DC


Iohexol (Omnipaque 350 Mg/ml) 90 ml 1X  ONCE IV  Last administered on 3/16/20at 

03:25;  Start 3/16/20 at 03:30;  Stop 3/16/20 at 03:31;  Status DC


Info (CONTRAST GIVEN -- Rx MONITORING) 1 each PRN DAILY  PRN MC SEE COMMENTS;  

Start 3/16/20 at 03:30;  Stop 3/18/20 at 03:29;  Status DC


Hydromorphone HCl (Dilaudid) 0.5 mg 1X  ONCE IV  Last administered on 3/16/20at 

03:55;  Start 3/16/20 at 04:30;  Stop 3/16/20 at 04:32;  Status DC


Ondansetron HCl (Zofran) 4 mg PRN Q8HRS  PRN IV NAUSEA/VOMITING 1ST CHOICE;  

Start 3/16/20 at 05:00;  Stop 3/16/20 at 09:27;  Status DC


Morphine Sulfate (Morphine Sulfate) 2 mg PRN Q2HR  PRN IV SEVERE PAIN 7-10 Last 

administered on 3/17/20at 12:26;  Start 3/16/20 at 05:00;  Stop 3/17/20 at 

14:15;  Status DC


Sodium Chloride 1,000 ml @  125 mls/hr Q8H IV  Last administered on 3/16/20at 

20:56;  Start 3/16/20 at 05:00;  Stop 3/17/20 at 04:59;  Status DC


Hydromorphone HCl (Dilaudid) 0.5 mg PRN Q3HRS  PRN IV SEVERE PAIN 7-10 Last 

administered on 3/17/20at 10:06;  Start 3/16/20 at 05:00;  Stop 3/17/20 at 

12:01;  Status DC


Piperacillin Sod/ Tazobactam Sod 4.5 gm/Sodium Chloride 100 ml @  200 mls/hr 1X 

ONCE IV  Last administered on 3/16/20at 05:44;  Start 3/16/20 at 06:00;  Stop 

3/16/20 at 06:29;  Status DC


Ondansetron HCl (Zofran) 4 mg PRN Q4HRS  PRN IV NAUSEA/VOMITING 1ST CHOICE Last 

administered on 7/8/20at 08:07;  Start 3/16/20 at 09:30


Insulin Human Lispro (HumaLOG) 0-9 UNITS Q6HRS SQ  Last administered on 7/8/20at

00:49;  Start 3/16/20 at 09:30


Dextrose (Dextrose 50%-Water Syringe) 12.5 gm PRN Q15MIN  PRN IV SEE COMMENTS;  

Start 3/16/20 at 09:30


Pantoprazole Sodium (PROTONIX VIAL for IV PUSH) 40 mg DAILYAC IVP  Last ad

ministered on 7/8/20at 08:07;  Start 3/16/20 at 11:30


Prochlorperazine Edisylate (Compazine) 10 mg PRN Q6HRS  PRN IV NAUSEA/VOMITING, 

2nd CHOICE Last administered on 6/27/20at 10:53;  Start 3/16/20 at 17:45


Atenolol (Tenormin) 100 mg DAILY PO ;  Start 3/17/20 at 09:00;  Stop 3/16/20 at 

20:08;  Status DC


Metoprolol Tartrate (Lopressor Vial) 2.5 mg Q6HRS IVP  Last administered on 3/1

7/20at 05:51;  Start 3/16/20 at 20:15;  Stop 3/17/20 at 10:02;  Status DC


Metoprolol Tartrate (Lopressor Vial) 5 mg Q6HRS IVP  Last administered on 

3/26/20at 00:12;  Start 3/17/20 at 10:15;  Stop 3/28/20 at 08:48;  Status DC


Hydromorphone HCl (Dilaudid) 1 mg PRN Q3HRS  PRN IV SEVERE PAIN 7-10 Last 

administered on 3/23/20at 05:13;  Start 3/17/20 at 12:00;  Stop 3/31/20 at 

00:25;  Status DC


Lidocaine HCl (Buffered Lidocaine 1%) 3 ml STK-MED ONCE .ROUTE ;  Start 3/17/20 

at 12:55;  Stop 3/17/20 at 12:56;  Status DC


Albumin Human 500 ml @  125 mls/hr 1X  ONCE IV  Last administered on 3/17/20at 

14:33;  Start 3/17/20 at 14:30;  Stop 3/17/20 at 18:32;  Status DC


Norepinephrine Bitartrate 8 mg/ Dextrose 258 ml @  17.299 mls/ hr CONT  PRN IV 

PER PROTOCOL Last administered on 4/14/20at 12:48;  Start 3/17/20 at 15:30;  

Stop 4/17/20 at 09:19;  Status DC


Sodium Chloride 1,000 ml @  125 mls/hr Q8H IV  Last administered on 3/17/20at 

21:04;  Start 3/17/20 at 16:00;  Stop 3/18/20 at 02:42;  Status DC


Albumin Human 500 ml @  125 mls/hr PRN BID  PRN IV After every 2L NSS & BP < 

90mm Last administered on 6/30/20at 16:06;  Start 3/17/20 at 16:00;  Stop 7/3/20

at 09:30;  Status DC


Iohexol (Omnipaque 300 Mg/ml) 60 ml 1X  ONCE IV  Last administered on 3/17/20at 

17:20;  Start 3/17/20 at 17:00;  Stop 3/17/20 at 17:01;  Status DC


Info (CONTRAST GIVEN -- Rx MONITORING) 1 each PRN DAILY  PRN MC SEE COMMENTS;  

Start 3/17/20 at 17:00;  Stop 3/19/20 at 16:59;  Status DC


Meropenem 1 gm/ Sodium Chloride 100 ml @  200 mls/hr Q8HRS IV  Last administered

on 3/18/20at 05:45;  Start 3/17/20 at 20:00;  Stop 3/18/20 at 08:48;  Status DC


Furosemide (Lasix) 40 mg 1X  ONCE IVP  Last administered on 3/17/20at 22:12;  

Start 3/17/20 at 22:30;  Stop 3/17/20 at 22:31;  Status DC


Calcium Chloride 1000 mg/Sodium Chloride 110 ml @  220 mls/hr 1X  ONCE IV  Last 

administered on 3/17/20at 22:11;  Start 3/17/20 at 22:30;  Stop 3/17/20 at 22:59

;  Status DC


Albuterol Sulfate (Ventolin Neb Soln) 2.5 mg 1X  ONCE NEB  Last administered on 

3/18/20at 00:56;  Start 3/17/20 at 22:30;  Stop 3/17/20 at 22:31;  Status DC


Insulin Human Regular (HumuLIN R VIAL) 5 unit 1X  ONCE IV  Last administered on 

3/17/20at 22:14;  Start 3/17/20 at 22:30;  Stop 3/17/20 at 22:31;  Status DC


Magnesium Sulfate 50 ml @ 25 mls/hr 1X  ONCE IV  Last administered on 3/18/20at 

02:57;  Start 3/18/20 at 03:00;  Stop 3/18/20 at 04:59;  Status DC


Calcium Gluconate 1000 mg/Sodium Chloride 110 ml @  220 mls/hr 1X  ONCE IV  Last

administered on 3/18/20at 02:46;  Start 3/18/20 at 03:00;  Stop 3/18/20 at 

03:29;  Status DC


Sodium Chloride 1,000 ml @  200 mls/hr Q5H IV  Last administered on 3/18/20at 

02:46;  Start 3/18/20 at 03:00;  Stop 3/18/20 at 10:21;  Status DC


Calcium Gluconate 1000 mg/Sodium Chloride 110 ml @  220 mls/hr 1X  ONCE IV  Last

administered on 3/18/20at 03:21;  Start 3/18/20 at 03:30;  Stop 3/18/20 at 

03:59;  Status DC


Sodium Bicarbonate 50 meq/Sodium Chloride 1,050 ml @  75 mls/hr Q14H IV  Last 

administered on 3/22/20at 21:10;  Start 3/18/20 at 07:30;  Stop 3/23/20 at 

10:28;  Status DC


Calcium Gluconate 2000 mg/Sodium Chloride 120 ml @  220 mls/hr 1X  ONCE IV  Last

administered on 3/18/20at 09:05;  Start 3/18/20 at 07:30;  Stop 3/18/20 at 

08:02;  Status DC


Lidocaine HCl (Xylocaine-Mpf 1% 2ml Vial) 2 ml STK-MED ONCE .ROUTE ;  Start 

3/18/20 at 08:47;  Stop 3/18/20 at 08:47;  Status DC


Meropenem 500 mg/ Sodium Chloride 50 ml @  100 mls/hr Q12HR IV  Last 

administered on 3/23/20at 21:01;  Start 3/18/20 at 18:00;  Stop 3/24/20 at 

07:58;  Status DC


Lidocaine HCl (Buffered Lidocaine 1%) 3 ml STK-MED ONCE .ROUTE ;  Start 3/18/20 

at 09:46;  Stop 3/18/20 at 09:46;  Status DC


Lidocaine HCl (Buffered Lidocaine 1%) 6 ml 1X  ONCE INJ  Last administered on 

3/18/20at 10:26;  Start 3/18/20 at 10:15;  Stop 3/18/20 at 10:16;  Status DC


Info (Tpn Per Pharmacy) 1 each PRN DAILY  PRN MC SEE COMMENTS Last administered 

on 7/7/20at 12:05;  Start 3/18/20 at 12:00


Sodium Chloride 1,000 ml @  1,000 mls/hr Q1H PRN IV hypotension;  Start 3/18/20 

at 12:07;  Stop 3/18/20 at 18:06;  Status DC


Diphenhydramine HCl (Benadryl) 25 mg 1X PRN  PRN IV ITCHING;  Start 3/18/20 at 

12:15;  Stop 3/19/20 at 12:14;  Status DC


Diphenhydramine HCl (Benadryl) 25 mg 1X PRN  PRN IV ITCHING;  Start 3/18/20 at 

12:15;  Stop 3/19/20 at 12:14;  Status DC


Sodium Chloride 1,000 ml @  400 mls/hr Q2H30M PRN IV PATENCY;  Start 3/18/20 at 

12:07;  Stop 3/19/20 at 00:06;  Status DC


Info (PHARMACY MONITORING -- do not chart) 1 each PRN DAILY  PRN MC SEE 

COMMENTS;  Start 3/18/20 at 12:15;  Stop 3/20/20 at 08:13;  Status DC


Sodium Chloride 90 meq/Calcium Gluconate 10 meq/ Multivitamins 10 ml/Chromium/ 

Copper/Manganese/ Seleni/Zn 1 ml/ Total Parenteral Nutrition/Amino 

Acids/Dextrose/ Fat Emulsion Intravenous 55.005 ml  @ 2.292 mls/hr TPN  CONT IV 

;  Start 3/18/20 at 22:00;  Stop 3/18/20 at 12:33;  Status DC


Info (Tpn Per Pharmacy) 1 each PRN DAILY  PRN MC SEE COMMENTS;  Start 3/18/20 at

12:30;  Status UNV


Sodium Chloride 90 meq/Calcium Gluconate 10 meq/ Multivitamins 10 ml/Chromium/ 

Copper/Manganese/ Seleni/Zn 0.5 ml/ Total Parenteral Nutrition/Amino 

Acids/Dextrose/ Fat Emulsion Intravenous 1,512 ml @  63 mls/hr TPN  CONT IV  

Last administered on 3/18/20at 22:06;  Start 3/18/20 at 22:00;  Stop 3/19/20 at 

21:59;  Status DC


Calcium Carbonate/ Glycine (Tums) 500 mg PRN AFTMEALHC  PRN PO INDIGESTION;  

Start 3/18/20 at 17:45;  Stop 5/13/20 at 10:25;  Status DC


Calcium Gluconate (Calcium Gluconate) 2,000 mg 1X  ONCE IVP  Last administered 

on 3/19/20at 02:19;  Start 3/19/20 at 02:15;  Stop 3/19/20 at 02:16;  Status DC


Calcium Chloride 3000 mg/Sodium Chloride 1,030 ml @  50 mls/hr [id number] IV  Last 

administered on 3/21/20at 02:17;  Start 3/19/20 at 08:00;  Stop 3/21/20 at 

15:23;  Status DC


Lorazepam (Ativan Inj) 1 mg PRN Q4HRS  PRN IVP ANXIETY / AGITATION, 2nd choic 

Last administered on 4/17/20at 03:51;  Start 3/19/20 at 09:00;  Stop 4/17/20 at 

09:19;  Status DC


Sodium Chloride 1,000 ml @  1,000 mls/hr Q1H PRN IV hypotension;  Start 3/19/20 

at 08:56;  Stop 3/19/20 at 14:55;  Status DC


Albumin Human 200 ml @  200 mls/hr 1X PRN  PRN IV Hypotension;  Start 3/19/20 at

09:00;  Stop 3/19/20 at 14:59;  Status DC


Diphenhydramine HCl (Benadryl) 25 mg 1X PRN  PRN IV ITCHING;  Start 3/19/20 at 

09:00;  Stop 3/20/20 at 08:59;  Status DC


Diphenhydramine HCl (Benadryl) 25 mg 1X PRN  PRN IV ITCHING;  Start 3/19/20 at 

09:00;  Stop 3/20/20 at 08:59;  Status DC


Sodium Chloride 1,000 ml @  400 mls/hr Q2H30M PRN IV PATENCY;  Start 3/19/20 at 

08:56;  Stop 3/19/20 at 20:55;  Status DC


Info (PHARMACY MONITORING -- do not chart) 1 each PRN DAILY  PRN MC SEE 

COMMENTS;  Start 3/19/20 at 09:00;  Status UNV


Info (PHARMACY MONITORING -- do not chart) 1 each PRN DAILY  PRN MC SEE 

COMMENTS;  Start 3/19/20 at 09:00;  Stop 3/20/20 at 08:13;  Status DC


Digoxin (Lanoxin) 500 mcg 1X  ONCE IV  Last administered on 3/19/20at 10:04;  

Start 3/19/20 at 10:00;  Stop 3/19/20 at 10:01;  Status DC


Digoxin (Lanoxin) 125 mcg 1X  ONCE IV  Last administered on 3/19/20at 17:10;  

Start 3/19/20 at 18:00;  Stop 3/19/20 at 18:01;  Status DC


Magnesium Sulfate 100 ml @  25 mls/hr 1X  ONCE IV  Last administered on 

3/19/20at 12:48;  Start 3/19/20 at 13:00;  Stop 3/19/20 at 16:59;  Status DC


Sodium Chloride 90 meq/Magnesium Sulfate 10 meq/ Calcium Gluconate 20 meq/ 

Multivitamins 10 ml/Chromium/ Copper/Manganese/ Seleni/Zn 0.5 ml/ Total 

Parenteral Nutrition/Amino Acids/Dextrose/ Fat Emulsion Intravenous 1,512 ml @  

63 mls/hr TPN  CONT IV  Last administered on 3/19/20at 22:25;  Start 3/19/20 at 

22:00;  Stop 3/20/20 at 21:59;  Status DC


Sodium Chloride 1,000 ml @  1,000 mls/hr Q1H PRN IV hypotension;  Start 3/20/20 

at 08:05;  Stop 3/20/20 at 14:04;  Status DC


Albumin Human 200 ml @  200 mls/hr 1X  ONCE IV  Last administered on 3/20/20at 

08:57;  Start 3/20/20 at 08:15;  Stop 3/20/20 at 09:14;  Status DC


Diphenhydramine HCl (Benadryl) 25 mg 1X PRN  PRN IV ITCHING;  Start 3/20/20 at 

08:15;  Stop 3/21/20 at 08:14;  Status DC


Diphenhydramine HCl (Benadryl) 25 mg 1X PRN  PRN IV ITCHING;  Start 3/20/20 at 

08:15;  Stop 3/21/20 at 08:14;  Status DC


Sodium Chloride 1,000 ml @  400 mls/hr Q2H30M PRN IV PATENCY;  Start 3/20/20 at 

08:05;  Stop 3/20/20 at 20:04;  Status DC


Info (PHARMACY MONITORING -- do not chart) 1 each PRN DAILY  PRN MC SEE 

COMMENTS;  Start 3/20/20 at 08:15;  Stop 3/24/20 at 07:57;  Status DC


Sodium Chloride 90 meq/Potassium Chloride 15 meq/ Potassium Phosphate 10 mmol/ 

Magnesium Sulfate 10 meq/Calcium Gluconate 20 meq/ Multivitamins 10 ml/Chromium/

Copper/Manganese/ Seleni/Zn 0.5 ml/ Total Parenteral Nutrition/Amino 

Acids/Dextrose/ Fat Emulsion Intravenous 1,512 ml @  63 mls/hr TPN  CONT IV  

Last administered on 3/20/20at 21:01;  Start 3/20/20 at 22:00;  Stop 3/21/20 at 

21:59;  Status DC


Potassium Chloride/Water 100 ml @  100 mls/hr 1X  ONCE IV  Last administered on 

3/20/20at 14:09;  Start 3/20/20 at 14:00;  Stop 3/20/20 at 14:59;  Status DC


Benzocaine (Hurricaine One) 1 spray 1X  ONCE MM  Last administered on 3/20/20at 

16:38;  Start 3/20/20 at 14:30;  Stop 3/20/20 at 14:31;  Status DC


Lidocaine HCl (Glydo (Lidocaine) Jelly) 1 thomas 1X  ONCE MM  Last administered on 

3/20/20at 16:38;  Start 3/20/20 at 14:30;  Stop 3/20/20 at 14:31;  Status DC


Linezolid/Dextrose 300 ml @  300 mls/hr Q12HR IV  Last administered on 3/26/20at

21:04;  Start 3/20/20 at 20:00;  Stop 3/27/20 at 07:50;  Status DC


Acetaminophen (Tylenol) 650 mg PRN Q6HRS  PRN PO MILD PAIN / TEMP;  Start 

3/21/20 at 03:30;  Stop 3/21/20 at 03:36;  Status DC


Acetaminophen (Tylenol) 650 mg PRN Q6HRS  PRN PEG MILD PAIN / TEMP Last 

administered on 4/16/20at 19:56;  Start 3/21/20 at 03:36;  Stop 5/13/20 at 

10:25;  Status DC


Sodium Chloride 1,000 ml @  1,000 mls/hr Q1H PRN IV hypotension;  Start 3/21/20 

at 07:50;  Stop 3/21/20 at 13:49;  Status DC


Albumin Human 200 ml @  200 mls/hr 1X PRN  PRN IV Hypotension;  Start 3/21/20 at

08:00;  Stop 3/21/20 at 13:59;  Status DC


Sodium Chloride (Normal Saline Flush) 10 ml 1X PRN  PRN IV AP catheter pack;  

Start 3/21/20 at 08:00;  Stop 3/22/20 at 07:59;  Status DC


Sodium Chloride (Normal Saline Flush) 10 ml 1X PRN  PRN IV  catheter pack;  

Start 3/21/20 at 08:00;  Stop 3/22/20 at 07:59;  Status DC


Sodium Chloride 1,000 ml @  400 mls/hr Q2H30M PRN IV PATENCY;  Start 3/21/20 at 

07:50;  Stop 3/21/20 at 19:49;  Status DC


Info (PHARMACY MONITORING -- do not chart) 1 each PRN DAILY  PRN MC SEE 

COMMENTS;  Start 3/21/20 at 08:00;  Status UNV


Info (PHARMACY MONITORING -- do not chart) 1 each PRN DAILY  PRN MC SEE 

COMMENTS;  Start 3/21/20 at 08:00;  Stop 3/23/20 at 08:25;  Status DC


Sodium Chloride 90 meq/Potassium Chloride 15 meq/ Potassium Phosphate 10 mmol/ M

agnesium Sulfate 10 meq/Calcium Gluconate 20 meq/ Multivitamins 10 ml/Chromium/ 

Copper/Manganese/ Seleni/Zn 0.5 ml/ Total Parenteral Nutrition/Amino 

Acids/Dextrose/ Fat Emulsion Intravenous 1,512 ml @  63 mls/hr TPN  CONT IV  

Last administered on 3/21/20at 20:57;  Start 3/21/20 at 22:00;  Stop 3/22/20 at 

21:59;  Status DC


Sodium Chloride 90 meq/Potassium Chloride 15 meq/ Potassium Phosphate 15 mmol/ 

Magnesium Sulfate 10 meq/Calcium Gluconate 20 meq/ Multivitamins 10 ml/Chromium/

Copper/Manganese/ Seleni/Zn 0.5 ml/ Total Parenteral Nutrition/Amino 

Acids/Dextrose/ Fat Emulsion Intravenous 1,512 ml @  63 mls/hr TPN  CONT IV ;  

Start 3/22/20 at 22:00;  Stop 3/22/20 at 14:16;  Status DC


Sodium Chloride 90 meq/Potassium Chloride 15 meq/ Potassium Phosphate 15 mmol/ 

Magnesium Sulfate 10 meq/Calcium Gluconate 20 meq/ Multivitamins 10 ml/Chromium/

Copper/Manganese/ Seleni/Zn 0.5 ml/ Total Parenteral Nutrition/Amino 

Acids/Dextrose/ Fat Emulsion Intravenous 1,200 ml @  50 mls/hr TPN  CONT IV ;  

Start 3/22/20 at 22:00;  Stop 3/22/20 at 14:17;  Status DC


Sodium Chloride 90 meq/Potassium Chloride 15 meq/ Potassium Phosphate 10 mmol/ 

Magnesium Sulfate 10 meq/Calcium Gluconate 20 meq/ Multivitamins 10 ml/Chromium/

Copper/Manganese/ Seleni/Zn 0.5 ml/ Total Parenteral Nutrition/Amino 

Acids/Dextrose/ Fat Emulsion Intravenous 1,200 ml @  50 mls/hr TPN  CONT IV  

Last administered on 3/22/20at 23:29;  Start 3/22/20 at 22:00;  Stop 3/23/20 at 

21:59;  Status DC


Sodium Chloride 1,000 ml @  1,000 mls/hr Q1H PRN IV hypotension;  Start 3/23/20 

at 07:28;  Stop 3/23/20 at 13:27;  Status DC


Albumin Human 200 ml @  200 mls/hr 1X  ONCE IV  Last administered on 3/23/20at 

08:51;  Start 3/23/20 at 07:30;  Stop 3/23/20 at 08:29;  Status DC


Diphenhydramine HCl (Benadryl) 25 mg 1X PRN  PRN IV ITCHING;  Start 3/23/20 at 

07:30;  Stop 3/24/20 at 07:29;  Status DC


Diphenhydramine HCl (Benadryl) 25 mg 1X PRN  PRN IV ITCHING;  Start 3/23/20 at 

07:30;  Stop 3/24/20 at 07:29;  Status DC


Sodium Chloride 1,000 ml @  400 mls/hr Q2H30M PRN IV PATENCY;  Start 3/23/20 at 

07:28;  Stop 3/23/20 at 19:27;  Status DC


Info (PHARMACY MONITORING -- do not chart) 1 each PRN DAILY  PRN MC SEE COMMENT

S;  Start 3/23/20 at 07:30;  Stop 4/3/20 at 13:01;  Status DC


Metronidazole 100 ml @  100 mls/hr Q6HRS IV  Last administered on 4/8/20at 

06:26;  Start 3/23/20 at 08:30;  Stop 4/8/20 at 09:58;  Status DC


Micafungin Sodium 100 mg/Dextrose 100 ml @  100 mls/hr Q24H IV  Last 

administered on 4/30/20at 08:18;  Start 3/23/20 at 09:00;  Stop 4/30/20 at 

20:58;  Status DC


Propofol 0 ml @ As Directed STK-MED ONCE IV ;  Start 3/23/20 at 07:53;  Stop 

3/23/20 at 07:53;  Status DC


Etomidate (Amidate) 20 mg STK-MED ONCE IV ;  Start 3/23/20 at 07:53;  Stop 

3/23/20 at 07:54;  Status DC


Midazolam HCl (Versed) 5 mg STK-MED ONCE .ROUTE ;  Start 3/23/20 at 07:57;  Stop

3/23/20 at 07:57;  Status DC


Fentanyl Citrate 30 ml @ 0 mls/hr CONT  PRN IV SEE PROTOCOL Last administered on

4/17/20at 06:12;  Start 3/23/20 at 08:15;  Stop 4/17/20 at 09:19;  Status DC


Artificial Tears (Artificial Tears) 1 drop PRN Q1HR  PRN OU DRY EYE, 1st choice;

 Start 3/23/20 at 08:15;  Stop 4/29/20 at 05:31;  Status DC


Midazolam HCl 50 mg/Sodium Chloride 50 ml @ 0 mls/hr CONT  PRN IV SEE PROTOCOL 

Last administered on 3/26/20at 22:39;  Start 3/23/20 at 08:15;  Stop 3/28/20 at 

15:59;  Status DC


Etomidate (Amidate) 8 mg 1X  ONCE IV  Last administered on 3/23/20at 08:33;  

Start 3/23/20 at 08:30;  Stop 3/23/20 at 08:31;  Status DC


Succinylcholine Chloride (Anectine) 120 mg 1X  ONCE IV  Last administered on 

3/23/20at 08:34;  Start 3/23/20 at 08:30;  Stop 3/23/20 at 08:31;  Status DC


Midazolam HCl (Versed) 5 mg 1X  ONCE IV ;  Start 3/23/20 at 08:30;  Stop 3/23/20

at 08:31;  Status DC


Potassium Chloride 15 meq/ Bicarbonate Dialysis Soln w/ out KCl 5,007.5 ml  @ 

1,000 mls/ hr Q5H1M IV  Last administered on 3/24/20at 11:11;  Start 3/23/20 at 

12:00;  Stop 3/24/20 at 11:15;  Status DC


Potassium Chloride 15 meq/ Bicarbonate Dialysis Soln w/ out KCl 5,007.5 ml  @ 

1,000 mls/ hr Q5H1M IV  Last administered on 3/24/20at 11:12;  Start 3/23/20 at 

12:00;  Stop 3/24/20 at 11:17;  Status DC


Potassium Chloride 15 meq/ Bicarbonate Dialysis Soln w/ out KCl 5,007.5 ml  @ 

1,000 mls/ hr Q5H1M IV  Last administered on 3/24/20at 11:11;  Start 3/23/20 at 

12:00;  Stop 3/24/20 at 11:19;  Status DC


Sodium Chloride 90 meq/Potassium Chloride 15 meq/ Potassium Phosphate 10 mmol/ 

Magnesium Sulfate 10 meq/Calcium Gluconate 20 meq/ Multivitamins 10 ml/Chromium/

Copper/Manganese/ Seleni/Zn 0.5 ml/ Total Parenteral Nutrition/Amino 

Acids/Dextrose/ Fat Emulsion Intravenous 1,400 ml @  58.333 mls/ hr TPN  CONT IV

 Last administered on 3/23/20at 21:42;  Start 3/23/20 at 22:00;  Stop 3/24/20 at

21:59;  Status DC


Heparin Sodium (Porcine) (Heparin Sodium) 5,000 unit Q8HRS SQ  Last administered

on 3/28/20at 05:55;  Start 3/23/20 at 15:00;  Stop 3/28/20 at 13:28;  Status DC


Meropenem 500 mg/ Sodium Chloride 50 ml @  100 mls/hr Q6HRS IV  Last 

administered on 3/25/20at 06:00;  Start 3/24/20 at 09:00;  Stop 3/25/20 at 

07:29;  Status DC


Potassium Phosphate 20 mmol/ Sodium Chloride 106.6667 ml @  51.667 m... 1X  ONCE

IV  Last administered on 3/24/20at 11:22;  Start 3/24/20 at 10:15;  Stop 3/24/20

at 12:18;  Status DC


Acetaminophen (Tylenol Supp) 650 mg PRN Q6HRS  PRN NH MILD PAIN / TEMP > 100.3'F

Last administered on 6/29/20at 18:16;  Start 3/24/20 at 10:30


Potassium Chloride/Water 100 ml @  100 mls/hr Q1H IV  Last administered on 

3/24/20at 12:12;  Start 3/24/20 at 11:00;  Stop 3/24/20 at 12:59;  Status DC


Potassium Chloride 20 meq/ Bicarbonate Dialysis Soln w/ out KCl 5,010 ml @  

1,000 mls/hr Q5H1M IV  Last administered on 3/25/20at 08:48;  Start 3/24/20 at 

12:00;  Stop 3/25/20 at 13:03;  Status DC


Potassium Chloride 20 meq/ Bicarbonate Dialysis Soln w/ out KCl 5,010 ml @  

1,000 mls/hr Q5H1M IV  Last administered on 3/29/20at 14:52;  Start 3/24/20 at 

11:30;  Stop 3/29/20 at 19:59;  Status DC


Potassium Chloride 20 meq/ Bicarbonate Dialysis Soln w/ out KCl 5,010 ml @  

1,000 mls/hr Q5H1M IV  Last administered on 3/29/20at 14:53;  Start 3/24/20 at 

11:30;  Stop 3/29/20 at 19:59;  Status DC


Sodium Chloride 90 meq/Potassium Chloride 15 meq/ Potassium Phosphate 15 mmol/ 

Magnesium Sulfate 10 meq/Calcium Gluconate 15 meq/ Multivitamins 10 ml/Chromium/

Copper/Manganese/ Seleni/Zn 0.5 ml/ Total Parenteral Nutrition/Amino 

Acids/Dextrose/ Fat Emulsion Intravenous 1,400 ml @  58.333 mls/ hr TPN  CONT IV

 Last administered on 3/24/20at 22:17;  Start 3/24/20 at 22:00;  Stop 3/25/20 at

21:59;  Status DC


Cefepime HCl (Maxipime) 2 gm Q12HR IVP  Last administered on 4/7/20at 20:56;  

Start 3/25/20 at 09:00;  Stop 4/8/20 at 09:58;  Status DC


Daptomycin 500 mg/ Sodium Chloride 50 ml @  100 mls/hr Q48H IV  Last 

administered on 4/10/20at 09:57;  Start 3/25/20 at 08:30;  Stop 4/10/20 at 

10:07;  Status DC


Lidocaine HCl (Buffered Lidocaine 1%) 3 ml 1X  ONCE INJ  Last administered on 

3/25/20at 10:27;  Start 3/25/20 at 10:30;  Stop 3/25/20 at 10:31;  Status DC


Potassium Phosphate 20 mmol/ Sodium Chloride 106.6667 ml @  51.667 m... 1X  ONCE

IV  Last administered on 3/25/20at 12:51;  Start 3/25/20 at 13:00;  Stop 3/25/20

at 15:03;  Status DC


Sodium Chloride 90 meq/Potassium Chloride 15 meq/ Potassium Phosphate 18 mmol/ 

Magnesium Sulfate 8 meq/Calcium Gluconate 15 meq/ Multivitamins 10 ml/Chromium/ 

Copper/Manganese/ Seleni/Zn 0.5 ml/ Total Parenteral Nutrition/Amino 

Acids/Dextrose/ Fat Emulsion Intravenous 1,400 ml @  58.333 mls/ hr TPN  CONT IV

 Last administered on 3/25/20at 22:16;  Start 3/25/20 at 22:00;  Stop 3/26/20 at

21:59;  Status DC


Potassium Chloride 20 meq/ Bicarbonate Dialysis Soln w/ out KCl 5,010 ml @  

1,000 mls/hr Q5H1M IV  Last administered on 3/29/20at 14:54;  Start 3/25/20 at 

16:00;  Stop 3/29/20 at 19:59;  Status DC


Multi-Ingred Cream/Lotion/Oil/ Oint (Artificial Tears Eye Ointment) 1 thomas PRN 

Q1HR  PRN OU DRY EYE, 2nd choice Last administered on 4/13/20at 08:19;  Start 

3/25/20 at 17:30;  Stop 6/3/20 at 14:39;  Status DC


Sodium Chloride 90 meq/Potassium Chloride 15 meq/ Potassium Phosphate 18 mmol/ 

Magnesium Sulfate 8 meq/Calcium Gluconate 15 meq/ Multivitamins 10 ml/Chromium/ 

Copper/Manganese/ Seleni/Zn 0.5 ml/ Total Parenteral Nutrition/Amino 

Acids/Dextrose/ Fat Emulsion Intravenous 1,400 ml @  58.333 mls/ hr TPN  CONT IV

 Last administered on 3/26/20at 22:00;  Start 3/26/20 at 22:00;  Stop 3/27/20 at

21:59;  Status DC


Albumin Human 500 ml @  125 mls/hr 1X  ONCE IV ;  Start 3/26/20 at 14:15;  Stop 

3/26/20 at 18:14;  Status DC


Sodium Chloride 90 meq/Potassium Chloride 15 meq/ Potassium Phosphate 18 mmol/ 

Magnesium Sulfate 8 meq/Calcium Gluconate 15 meq/ Multivitamins 10 ml/Chromium/ 

Copper/Manganese/ Seleni/Zn 0.5 ml/ Insulin Human Regular 10 unit/ Total 

Parenteral Nutrition/Amino Acids/Dextrose/ Fat Emulsion Intravenous 1,400 ml @  

58.333 mls/ hr TPN  CONT IV  Last administered on 3/27/20at 21:43;  Start 

3/27/20 at 22:00;  Stop 3/28/20 at 21:59;  Status DC


Lidocaine HCl (Buffered Lidocaine 1%) 3 ml STK-MED ONCE .ROUTE ;  Start 3/25/20 

at 10:00;  Stop 3/27/20 at 13:57;  Status DC


Midazolam HCl 100 mg/Sodium Chloride 100 ml @ 7 mls/hr CONT  PRN IV SEE PROTOCOL

Last administered on 4/8/20at 15:35;  Start 3/28/20 at 16:00;  Stop 6/3/20 at 

14:38;  Status DC


Sodium Chloride 90 meq/Potassium Chloride 15 meq/ Potassium Phosphate 18 mmol/ 

Magnesium Sulfate 8 meq/Calcium Gluconate 15 meq/ Multivitamins 10 ml/Chromium/ 

Copper/Manganese/ Seleni/Zn 0.5 ml/ Insulin Human Regular 15 unit/ Total 

Parenteral Nutrition/Amino Acids/Dextrose/ Fat Emulsion Intravenous 1,400 ml @  

58.333 mls/ hr TPN  CONT IV  Last administered on 3/28/20at 20:34;  Start 

3/28/20 at 22:00;  Stop 3/29/20 at 21:59;  Status DC


Info ([facility] Electrolyte Protocol) 1 ea CONT PRN  PRN MC PER PROTOCOL;  Start 

3/29/20 at 13:15


Sodium Chloride 90 meq/Potassium Chloride 15 meq/ Potassium Phosphate 18 mmol/ 

Magnesium Sulfate 8 meq/Calcium Gluconate 15 meq/ Multivitamins 10 ml/Chromium/ 

Copper/Manganese/ Seleni/Zn 0.5 ml/ Insulin Human Regular 15 unit/ Total 

Parenteral Nutrition/Amino Acids/Dextrose/ Fat Emulsion Intravenous 1,400 ml @  

58.333 mls/ hr TPN  CONT IV  Last administered on 3/29/20at 22:05;  Start 

3/29/20 at 22:00;  Stop 3/30/20 at 21:59;  Status DC


Potassium Chloride 15 meq/ Bicarbonate Dialysis Soln w/ out KCl 5,007.5 ml  @ 

1,000 mls/ hr Q5H1M IV  Last administered on 4/1/20at 18:14;  Start 3/29/20 at 

20:00;  Stop 4/2/20 at 13:08;  Status DC


Potassium Chloride 15 meq/ Bicarbonate Dialysis Soln w/ out KCl 5,007.5 ml  @ 

1,000 mls/ hr Q5H1M IV  Last administered on 4/1/20at 18:14;  Start 3/29/20 at 

20:00;  Stop 4/2/20 at 13:08;  Status DC


Potassium Chloride 15 meq/ Bicarbonate Dialysis Soln w/ out KCl 5,007.5 ml  @ 

1,000 mls/ hr Q5H1M IV  Last administered on 4/1/20at 18:14;  Start 3/29/20 at 

20:00;  Stop 4/2/20 at 13:08;  Status DC


Iohexol (Omnipaque 240 Mg/ml) 30 ml 1X  ONCE PO  Last administered on 3/30/20at 

11:30;  Start 3/30/20 at 11:30;  Stop 3/30/20 at 11:33;  Status DC


Info (CONTRAST GIVEN -- Rx MONITORING) 1 each PRN DAILY  PRN MC SEE COMMENTS;  

Start 3/30/20 at 11:45;  Stop 4/1/20 at 11:44;  Status DC


Sodium Chloride 90 meq/Potassium Chloride 15 meq/ Potassium Phosphate 18 mmol/ 

Magnesium Sulfate 8 meq/Calcium Gluconate 15 meq/ Multivitamins 10 ml/Chromium/ 

Copper/Manganese/ Seleni/Zn 0.5 ml/ Insulin Human Regular 15 unit/ Total 

Parenteral Nutrition/Amino Acids/Dextrose/ Fat Emulsion Intravenous 1,400 ml @  

58.333 mls/ hr TPN  CONT IV  Last administered on 3/30/20at 21:47;  Start 

3/30/20 at 22:00;  Stop 3/31/20 at 21:59;  Status DC


Sodium Chloride 90 meq/Potassium Chloride 15 meq/ Potassium Phosphate 18 mmol/ 

Magnesium Sulfate 8 meq/Calcium Gluconate 15 meq/ Multivitamins 10 ml/Chromium/ 

Copper/Manganese/ Seleni/Zn 0.5 ml/ Insulin Human Regular 20 unit/ Total 

Parenteral Nutrition/Amino Acids/Dextrose/ Fat Emulsion Intravenous 1,400 ml @  

58.333 mls/ hr TPN  CONT IV  Last administered on 3/31/20at 21:36;  Start 

3/31/20 at 22:00;  Stop 4/1/20 at 21:59;  Status DC


Alteplase, Recombinant (Cathflo For Central Catheter Clearance) 1 mg 1X  ONCE 

INT CAT  Last administered on 3/31/20at 20:03;  Start 3/31/20 at 19:30;  Stop 

3/31/20 at 19:46;  Status DC


Alteplase, Recombinant (Cathflo For Central Catheter Clearance) 1 mg 1X  ONCE 

INT CAT  Last administered on 3/31/20at 22:05;  Start 3/31/20 at 22:00;  Stop 

3/31/20 at 22:01;  Status DC


Sodium Chloride 90 meq/Potassium Chloride 15 meq/ Potassium Phosphate 18 mmol/ 

Magnesium Sulfate 8 meq/Calcium Gluconate 15 meq/ Multivitamins 10 ml/Chromium/ 

Copper/Manganese/ Seleni/Zn 0.5 ml/ Insulin Human Regular 20 unit/ Total 

Parenteral Nutrition/Amino Acids/Dextrose/ Fat Emulsion Intravenous 1,400 ml @  

58.333 mls/ hr TPN  CONT IV  Last administered on 4/1/20at 21:30;  Start 4/1/20 

at 22:00;  Stop 4/2/20 at 21:59;  Status DC


Dexmedetomidine HCl 400 mcg/ Sodium Chloride 100 ml @ 0 mls/hr CONT  PRN IV 

ANXIETY / AGITATION Last administered on 5/30/20at 12:57;  Start 4/2/20 at 

08:15;  Stop 5/30/20 at 18:31;  Status DC


Sodium Chloride 500 ml @  500 mls/hr 1X PRN  PRN IV ELEVATED BP, SEE COMMENTS;  

Start 4/2/20 at 08:15


Atropine Sulfate (ATROPINE 0.5mg SYRINGE) 0.5 mg PRN Q5MIN  PRN IV SEE COMMENTS;

 Start 4/2/20 at 08:15


Furosemide (Lasix) 20 mg 1X  ONCE IVP  Last administered on 4/2/20at 08:19;  

Start 4/2/20 at 08:15;  Stop 4/2/20 at 08:16;  Status DC


Lidocaine HCl (Buffered Lidocaine 1%) 3 ml STK-MED ONCE .ROUTE ;  Start 4/2/20 

at 08:39;  Stop 4/2/20 at 08:39;  Status DC


Lidocaine HCl (Buffered Lidocaine 1%) 6 ml 1X  ONCE INJ  Last administered on 

4/2/20at 09:05;  Start 4/2/20 at 09:00;  Stop 4/2/20 at 09:06;  Status DC


Sodium Chloride 90 meq/Potassium Chloride 15 meq/ Potassium Phosphate 18 mmol/ 

Magnesium Sulfate 8 meq/Calcium Gluconate 15 meq/ Multivitamins 10 ml/Chromium/ 

Copper/Manganese/ Seleni/Zn 0.5 ml/ Insulin Human Regular 20 unit/ Total Lisa

ral Nutrition/Amino Acids/Dextrose/ Fat Emulsion Intravenous 1,400 ml @  58.333 

mls/ hr TPN  CONT IV  Last administered on 4/2/20at 22:45;  Start 4/2/20 at 

22:00;  Stop 4/3/20 at 21:59;  Status DC


Sodium Chloride 1,000 ml @  1,000 mls/hr Q1H PRN IV hypotension;  Start 4/3/20 

at 07:30;  Stop 4/3/20 at 13:29;  Status DC


Albumin Human 200 ml @  200 mls/hr 1X PRN  PRN IV Hypotension Last administered 

on 4/3/20at 09:36;  Start 4/3/20 at 07:30;  Stop 4/3/20 at 13:29;  Status DC


Sodium Chloride (Normal Saline Flush) 10 ml 1X PRN  PRN IV AP catheter pack;  

Start 4/3/20 at 07:30;  Stop 4/3/20 at 21:29;  Status DC


Sodium Chloride (Normal Saline Flush) 10 ml 1X PRN  PRN IV  catheter pack;  

Start 4/3/20 at 07:30;  Stop 4/4/20 at 07:29;  Status DC


Sodium Chloride 1,000 ml @  400 mls/hr Q2H30M PRN IV PATENCY;  Start 4/3/20 at 

07:30;  Stop 4/3/20 at 19:29;  Status DC


Info (PHARMACY MONITORING -- do not chart) 1 each PRN DAILY  PRN MC SEE 

COMMENTS;  Start 4/3/20 at 07:30;  Stop 4/3/20 at 13:02;  Status DC


Info (PHARMACY MONITORING -- do not chart) 1 each PRN DAILY  PRN MC SEE 

COMMENTS;  Start 4/3/20 at 07:30;  Stop 4/5/20 at 12:45;  Status DC


Sodium Chloride 90 meq/Potassium Chloride 15 meq/ Potassium Phosphate 10 mmol/ 

Magnesium Sulfate 8 meq/Calcium Gluconate 15 meq/ Multivitamins 10 ml/Chromium/ 

Copper/Manganese/ Seleni/Zn 0.5 ml/ Insulin Human Regular 25 unit/ Total 

Parenteral Nutrition/Amino Acids/Dextrose/ Fat Emulsion Intravenous 1,400 ml @  

58.333 mls/ hr TPN  CONT IV  Last administered on 4/3/20at 22:19;  Start 4/3/20 

at 22:00;  Stop 4/4/20 at 21:59;  Status DC


Heparin Sodium (Porcine) (Heparin Sodium) 5,000 unit Q12HR SQ  Last administered

on 4/26/20at 08:59;  Start 4/3/20 at 21:00;  Stop 4/26/20 at 10:05;  Status DC


Ondansetron HCl (Zofran) 4 mg PRN Q6HRS  PRN IV NAUSEA/VOMITING;  Start 4/6/20 

at 07:00;  Stop 4/7/20 at 06:59;  Status DC


Fentanyl Citrate (Fentanyl 2ml Vial) 25 mcg PRN Q5MIN  PRN IV MILD PAIN 1-3;  

Start 4/6/20 at 07:00;  Stop 4/7/20 at 06:59;  Status DC


Fentanyl Citrate (Fentanyl 2ml Vial) 50 mcg PRN Q5MIN  PRN IV MODERATE TO SEVERE

PAIN;  Start 4/6/20 at 07:00;  Stop 4/7/20 at 06:59;  Status DC


Ringer's Solution 1,000 ml @  30 mls/hr Q24H IV ;  Start 4/6/20 at 07:00;  Stop 

4/6/20 at 18:59;  Status DC


Lidocaine HCl (Xylocaine-Mpf 1% 2ml Vial) 2 ml PRN 1X  PRN ID PRIOR TO IV START;

 Start 4/6/20 at 07:00;  Stop 4/7/20 at 06:59;  Status DC


Prochlorperazine Edisylate (Compazine) 5 mg PACU PRN  PRN IV NAUSEA, MRX1;  

Start 4/6/20 at 07:00;  Stop 4/7/20 at 06:59;  Status DC


Sodium Chloride 1,000 ml @  1,000 mls/hr Q1H PRN IV hypotension;  Start 4/4/20 

at 09:10;  Stop 4/4/20 at 15:09;  Status DC


Albumin Human 200 ml @  200 mls/hr 1X PRN  PRN IV Hypotension Last administered 

on 4/4/20at 10:10;  Start 4/4/20 at 09:15;  Stop 4/4/20 at 15:14;  Status DC


Sodium Chloride 1,000 ml @  400 mls/hr Q2H30M PRN IV PATENCY;  Start 4/4/20 at 

09:10;  Stop 4/4/20 at 21:09;  Status DC


Info (PHARMACY MONITORING -- do not chart) 1 each PRN DAILY  PRN MC SEE 

COMMENTS;  Start 4/4/20 at 09:15;  Stop 4/5/20 at 12:45;  Status DC


Info (PHARMACY MONITORING -- do not chart) 1 each PRN DAILY  PRN MC SEE 

COMMENTS;  Start 4/4/20 at 09:15;  Stop 4/5/20 at 12:45;  Status DC


Sodium Chloride 90 meq/Potassium Chloride 15 meq/ Potassium Phosphate 10 mmol/ 

Magnesium Sulfate 8 meq/Calcium Gluconate 15 meq/ Multivitamins 10 ml/Chromium/ 

Copper/Manganese/ Seleni/Zn 0.5 ml/ Insulin Human Regular 25 unit/ Total 

Parenteral Nutrition/Amino Acids/Dextrose/ Fat Emulsion Intravenous 1,400 ml @  

58.333 mls/ hr TPN  CONT IV  Last administered on 4/4/20at 22:10;  Start 4/4/20 

at 22:00;  Stop 4/5/20 at 21:59;  Status DC


Magnesium Sulfate 50 ml @ 25 mls/hr PRN DAILY  PRN IV for Mag < 1.7 on am labs 

Last administered on 6/18/20at 10:57;  Start 4/5/20 at 09:15


Sodium Chloride 90 meq/Potassium Chloride 15 meq/ Potassium Phosphate 10 mmol/ 

Magnesium Sulfate 8 meq/Calcium Gluconate 15 meq/ Multivitamins 10 ml/Chromium/ 

Copper/Manganese/ Seleni/Zn 0.5 ml/ Insulin Human Regular 25 unit/ Total 

Parenteral Nutrition/Amino Acids/Dextrose/ Fat Emulsion Intravenous 1,400 ml @  

58.333 mls/ hr TPN  CONT IV  Last administered on 4/5/20at 21:20;  Start 4/5/20 

at 22:00;  Stop 4/6/20 at 21:59;  Status DC


Sodium Chloride 1,000 ml @  1,000 mls/hr Q1H PRN IV hypotension;  Start 4/5/20 

at 12:23;  Stop 4/5/20 at 18:22;  Status DC


Albumin Human 200 ml @  200 mls/hr 1X  ONCE IV  Last administered on 4/5/20at 

13:34;  Start 4/5/20 at 12:30;  Stop 4/5/20 at 13:29;  Status DC


Diphenhydramine HCl (Benadryl) 25 mg 1X PRN  PRN IV ITCHING;  Start 4/5/20 at 

12:30;  Stop 4/6/20 at 12:29;  Status DC


Diphenhydramine HCl (Benadryl) 25 mg 1X PRN  PRN IV ITCHING;  Start 4/5/20 at 

12:30;  Stop 4/6/20 at 12:29;  Status DC


Info (PHARMACY MONITORING -- do not chart) 1 each PRN DAILY  PRN MC SEE 

COMMENTS;  Start 4/5/20 at 12:30;  Status Cancel


Bupivacaine HCl/ Epinephrine Bitart (Sensorcain-Epi 0.5%-1:803664 Mpf) 30 ml 

STK-MED ONCE .ROUTE  Last administered on 4/6/20at 11:44;  Start 4/6/20 at 

11:00;  Stop 4/6/20 at 11:01;  Status DC


Cellulose (Surgicel Fibrillar 1x2) 1 each STK-MED ONCE .ROUTE ;  Start 4/6/20 at

11:00;  Stop 4/6/20 at 11:01;  Status DC


Sodium Chloride 90 meq/Potassium Chloride 15 meq/ Potassium Phosphate 10 mmol/ 

Magnesium Sulfate 12 meq/Calcium Gluconate 15 meq/ Multivitamins 10 ml/Chromium/

Copper/Manganese/ Seleni/Zn 0.5 ml/ Insulin Human Regular 25 unit/ Total 

Parenteral Nutrition/Amino Acids/Dextrose/ Fat Emulsion Intravenous 1,400 ml @  

58.333 mls/ hr TPN  CONT IV  Last administered on 4/6/20at 22:24;  Start 4/6/20 

at 22:00;  Stop 4/7/20 at 21:59;  Status DC


Propofol 20 ml @ As Directed STK-MED ONCE IV ;  Start 4/6/20 at 11:07;  Stop 

4/6/20 at 11:07;  Status DC


Cellulose (Surgicel Hemostat 4x8) 1 each STK-MED ONCE .ROUTE  Last administered 

on 4/6/20at 11:44;  Start 4/6/20 at 11:55;  Stop 4/6/20 at 11:56;  Status DC


Sevoflurane (Ultane) 60 ml STK-MED ONCE IH ;  Start 4/6/20 at 12:46;  Stop 

4/6/20 at 12:46;  Status DC


Sodium Chloride 1,000 ml @  1,000 mls/hr Q1H PRN IV hypotension;  Start 4/6/20 

at 13:51;  Stop 4/6/20 at 19:50;  Status DC


Albumin Human 200 ml @  200 mls/hr 1X PRN  PRN IV Hypotension Last administered 

on 4/6/20at 14:51;  Start 4/6/20 at 14:00;  Stop 4/6/20 at 19:59;  Status DC


Diphenhydramine HCl (Benadryl) 25 mg 1X PRN  PRN IV ITCHING;  Start 4/6/20 at 

14:00;  Stop 4/7/20 at 13:59;  Status DC


Diphenhydramine HCl (Benadryl) 25 mg 1X PRN  PRN IV ITCHING;  Start 4/6/20 at 

14:00;  Stop 4/7/20 at 13:59;  Status DC


Sodium Chloride 1,000 ml @  400 mls/hr Q2H30M PRN IV PATENCY;  Start 4/6/20 at 

13:51;  Stop 4/7/20 at 01:50;  Status DC


Info (PHARMACY MONITORING -- do not chart) 1 each PRN DAILY  PRN MC SEE 

COMMENTS;  Start 4/6/20 at 14:00;  Stop 4/9/20 at 08:16;  Status DC


Heparin Sodium (Porcine) (Hep Lock Adult) 500 unit STK-MED ONCE IVP ;  Start 

4/7/20 at 09:29;  Stop 4/7/20 at 09:30;  Status DC


Sodium Chloride 1,000 ml @  1,000 mls/hr Q1H PRN IV hypotension;  Start 4/7/20 

at 10:43;  Stop 4/7/20 at 16:42;  Status DC


Sodium Chloride 1,000 ml @  400 mls/hr Q2H30M PRN IV PATENCY;  Start 4/7/20 at 

10:43;  Stop 4/7/20 at 22:42;  Status DC


Info (PHARMACY MONITORING -- do not chart) 1 each PRN DAILY  PRN MC SEE 

COMMENTS;  Start 4/7/20 at 10:45;  Status UNV


Info (PHARMACY MONITORING -- do not chart) 1 each PRN DAILY  PRN MC SEE 

COMMENTS;  Start 4/7/20 at 10:45;  Status UNV


Sodium Chloride 90 meq/Potassium Chloride 15 meq/ Magnesium Sulfate 12 meq/Calc

ium Gluconate 15 meq/ Multivitamins 10 ml/Chromium/ Copper/Manganese/ Seleni/Zn 

0.5 ml/ Insulin Human Regular 25 unit/ Total Parenteral Nutrition/Amino 

Acids/Dextrose/ Fat Emulsion Intravenous 1,400 ml @  58.333 mls/ hr TPN  CONT IV

 Last administered on 4/7/20at 22:13;  Start 4/7/20 at 22:00;  Stop 4/8/20 at 

21:59;  Status DC


Sodium Chloride 1,000 ml @  1,000 mls/hr Q1H PRN IV hypotension;  Start 4/8/20 

at 07:50;  Stop 4/8/20 at 13:49;  Status DC


Albumin Human 200 ml @  200 mls/hr 1X  ONCE IV ;  Start 4/8/20 at 08:00;  Stop 

4/8/20 at 08:53;  Status DC


Diphenhydramine HCl (Benadryl) 25 mg 1X PRN  PRN IV ITCHING;  Start 4/8/20 at 

08:00;  Stop 4/9/20 at 07:59;  Status DC


Diphenhydramine HCl (Benadryl) 25 mg 1X PRN  PRN IV ITCHING;  Start 4/8/20 at 

08:00;  Stop 4/9/20 at 07:59;  Status DC


Info (PHARMACY MONITORING -- do not chart) 1 each PRN DAILY  PRN MC SEE 

COMMENTS;  Start 4/8/20 at 08:00;  Stop 4/9/20 at 08:16;  Status DC


Albumin Human 50 ml @ 50 mls/hr 1X  ONCE IV ;  Start 4/8/20 at 08:53;  Stop 

4/8/20 at 08:56;  Status DC


Albumin Human 200 ml @  50 mls/hr PRN 1X  PRN IV HYPOTENSION Last administered 

on 4/14/20at 11:54;  Start 4/8/20 at 09:00;  Stop 5/21/20 at 11:14;  Status DC


Meropenem 500 mg/ Sodium Chloride 50 ml @  100 mls/hr Q12H IV  Last administered

on 4/28/20at 10:45;  Start 4/8/20 at 10:00;  Stop 4/28/20 at 12:37;  Status DC


Sodium Chloride 90 meq/Magnesium Sulfate 12 meq/ Calcium Gluconate 15 meq/ 

Multivitamins 10 ml/Chromium/ Copper/Manganese/ Seleni/Zn 0.5 ml/ Insulin Human 

Regular 25 unit/ Total Parenteral Nutrition/Amino Acids/Dextrose/ Fat Emulsion 

Intravenous 1,400 ml @  58.333 mls/ hr TPN  CONT IV  Last administered on 

4/8/20at 21:41;  Start 4/8/20 at 22:00;  Stop 4/9/20 at 21:59;  Status DC


Sodium Chloride 1,000 ml @  1,000 mls/hr Q1H PRN IV hypotension;  Start 4/9/20 

at 07:58;  Stop 4/9/20 at 13:57;  Status DC


Albumin Human 200 ml @  200 mls/hr 1X PRN  PRN IV Hypotension Last administered 

on 4/9/20at 09:30;  Start 4/9/20 at 08:00;  Stop 4/9/20 at 13:59;  Status DC


Sodium Chloride 1,000 ml @  400 mls/hr Q2H30M PRN IV PATENCY;  Start 4/9/20 at 

07:58;  Stop 4/9/20 at 19:57;  Status DC


Info (PHARMACY MONITORING -- do not chart) 1 each PRN DAILY  PRN MC SEE 

COMMENTS;  Start 4/9/20 at 08:00;  Status Cancel


Info (PHARMACY MONITORING -- do not chart) 1 each PRN DAILY  PRN MC SEE 

COMMENTS;  Start 4/9/20 at 08:15;  Status UNV


Sodium Chloride 90 meq/Potassium Phosphate 5 mmol/ Magnesium Sulfate 12 

meq/Calcium Gluconate 15 meq/ Multivitamins 10 ml/Chromium/ Copper/Manganese/ 

Seleni/Zn 0.5 ml/ Insulin Human Regular 30 unit/ Total Parenteral 

Nutrition/Amino Acids/Dextrose/ Fat Emulsion Intravenous 1,400 ml @  58.333 mls/

hr TPN  CONT IV  Last administered on 4/9/20at 22:08;  Start 4/9/20 at 22:00;  

Stop 4/10/20 at 21:59;  Status DC


Linezolid/Dextrose 300 ml @  300 mls/hr Q12HR IV  Last administered on 4/20/20at

20:40;  Start 4/10/20 at 11:00;  Stop 4/21/20 at 08:10;  Status DC


Sodium Chloride 90 meq/Potassium Phosphate 15 mmol/ Magnesium Sulfate 12 

meq/Calcium Gluconate 15 meq/ Multivitamins 10 ml/Chromium/ Copper/Manganese/ 

Seleni/Zn 0.5 ml/ Insulin Human Regular 30 unit/ Total Parenteral 

Nutrition/Amino Acids/Dextrose/ Fat Emulsion Intravenous 1,400 ml @  58.333 mls/

hr TPN  CONT IV  Last administered on 4/10/20at 21:49;  Start 4/10/20 at 22:00; 

Stop 4/11/20 at 21:59;  Status DC


Sodium Chloride 90 meq/Potassium Phosphate 15 mmol/ Magnesium Sulfate 12 

meq/Calcium Gluconate 15 meq/ Multivitamins 10 ml/Chromium/ Copper/Manganese/ 

Seleni/Zn 0.5 ml/ Insulin Human Regular 40 unit/ Total Parenteral 

Nutrition/Amino Acids/Dextrose/ Fat Emulsion Intravenous 1,400 ml @  58.333 mls/

hr TPN  CONT IV  Last administered on 4/11/20at 21:21;  Start 4/11/20 at 22:00; 

Stop 4/12/20 at 21:59;  Status DC


Sodium Chloride 1,000 ml @  1,000 mls/hr Q1H PRN IV hypotension;  Start 4/11/20 

at 13:26;  Stop 4/11/20 at 19:25;  Status DC


Albumin Human 200 ml @  200 mls/hr 1X PRN  PRN IV Hypotension Last administered 

on 4/11/20at 15:00;  Start 4/11/20 at 13:30;  Stop 4/11/20 at 19:29;  Status DC


Sodium Chloride (Normal Saline Flush) 10 ml 1X PRN  PRN IV AP catheter pack;  

Start 4/11/20 at 13:30;  Stop 4/12/20 at 13:29;  Status DC


Sodium Chloride (Normal Saline Flush) 10 ml 1X PRN  PRN IV  catheter pack;  

Start 4/11/20 at 13:30;  Stop 4/12/20 at 13:29;  Status DC


Sodium Chloride 1,000 ml @  400 mls/hr Q2H30M PRN IV PATENCY;  Start 4/11/20 at 

13:26;  Stop 4/12/20 at 01:25;  Status DC


Info (PHARMACY MONITORING -- do not chart) 1 each PRN DAILY  PRN MC SEE 

COMMENTS;  Start 4/11/20 at 13:30;  Stop 4/11/20 at 13:33;  Status DC


Info (PHARMACY MONITORING -- do not chart) 1 each PRN DAILY  PRN MC SEE 

COMMENTS;  Start 4/11/20 at 13:30;  Stop 4/11/20 at 13:34;  Status DC


Sodium Chloride 90 meq/Potassium Phosphate 19 mmol/ Magnesium Sulfate 12 

meq/Calcium Gluconate 15 meq/ Multivitamins 10 ml/Chromium/ Copper/Manganese/ 

Seleni/Zn 0.5 ml/ Insulin Human Regular 40 unit/ Total Parenteral 

Nutrition/Amino Acids/Dextrose/ Fat Emulsion Intravenous 1,400 ml @  58.333 mls/

hr TPN  CONT IV  Last administered on 4/12/20at 21:54;  Start 4/12/20 at 22:00; 

Stop 4/13/20 at 21:59;  Status DC


Sodium Chloride 1,000 ml @  1,000 mls/hr Q1H PRN IV hypotension;  Start 4/13/20 

at 09:35;  Stop 4/13/20 at 15:34;  Status DC


Albumin Human 200 ml @  200 mls/hr 1X PRN  PRN IV Hypotension;  Start 4/13/20 at

09:45;  Stop 4/13/20 at 15:44;  Status DC


Diphenhydramine HCl (Benadryl) 25 mg 1X PRN  PRN IV ITCHING;  Start 4/13/20 at 

09:45;  Stop 4/14/20 at 09:44;  Status DC


Diphenhydramine HCl (Benadryl) 25 mg 1X PRN  PRN IV ITCHING;  Start 4/13/20 at 

09:45;  Stop 4/14/20 at 09:44;  Status DC


Sodium Chloride 1,000 ml @  400 mls/hr Q2H30M PRN IV PATENCY;  Start 4/13/20 at 

09:35;  Stop 4/13/20 at 21:34;  Status DC


Info (PHARMACY MONITORING -- do not chart) 1 each PRN DAILY  PRN MC SEE 

COMMENTS;  Start 4/13/20 at 09:45;  Status Cancel


Sodium Chloride 100 meq/Potassium Phosphate 19 mmol/ Magnesium Sulfate 12 

meq/Calcium Gluconate 15 meq/ Multivitamins 10 ml/Chromium/ Copper/Manganese/ 

Seleni/Zn 0.5 ml/ Insulin Human Regular 40 unit/ Potassium Chloride 20 meq/ 

Total Parenteral Nutrition/Amino Acids/Dextrose/ Fat Emulsion Intravenous 1,400 

ml @  58.333 mls/ hr TPN  CONT IV  Last administered on 4/13/20at 22:02;  Start 

4/13/20 at 22:00;  Stop 4/14/20 at 21:59;  Status DC


Furosemide (Lasix) 40 mg 1X  ONCE IVP  Last administered on 4/13/20at 14:39;  

Start 4/13/20 at 14:30;  Stop 4/13/20 at 14:31;  Status DC


Metronidazole 100 ml @  100 mls/hr Q8HRS IV  Last administered on 4/21/20at 

06:04;  Start 4/14/20 at 10:00;  Stop 4/21/20 at 08:10;  Status DC


Sodium Chloride 1,000 ml @  1,000 mls/hr Q1H PRN IV hypotension;  Start 4/14/20 

at 08:00;  Stop 4/14/20 at 13:59;  Status DC


Albumin Human 200 ml @  200 mls/hr 1X PRN  PRN IV Hypotension;  Start 4/14/20 at

08:00;  Stop 4/14/20 at 13:59;  Status DC


Sodium Chloride 1,000 ml @  400 mls/hr Q2H30M PRN IV PATENCY;  Start 4/14/20 at 

08:00;  Stop 4/14/20 at 19:59;  Status DC


Info (PHARMACY MONITORING -- do not chart) 1 each PRN DAILY  PRN MC SEE C

OMMENTS;  Start 4/14/20 at 11:30;  Status UNV


Info (PHARMACY MONITORING -- do not chart) 1 each PRN DAILY  PRN MC SEE 

COMMENTS;  Start 4/14/20 at 11:30;  Stop 4/16/20 at 12:13;  Status DC


Sodium Chloride 100 meq/Potassium Phosphate 19 mmol/ Magnesium Sulfate 12 

meq/Calcium Gluconate 15 meq/ Multivitamins 10 ml/Chromium/ Copper/Manganese/ 

Seleni/Zn 0.5 ml/ Insulin Human Regular 40 unit/ Potassium Chloride 20 meq/ 

Total Parenteral Nutrition/Amino Acids/Dextrose/ Fat Emulsion Intravenous 1,400 

ml @  58.333 mls/ hr TPN  CONT IV  Last administered on 4/14/20at 21:52;  Start 

4/14/20 at 22:00;  Stop 4/15/20 at 21:59;  Status DC


Sodium Chloride (Normal Saline Flush) 10 ml QSHIFT  PRN IV AFTER MEDS AND BLOOD 

DRAWS;  Start 4/14/20 at 15:00;  Stop 5/12/20 at 11:27;  Status DC


Sodium Chloride (Normal Saline Flush) 10 ml PRN Q5MIN  PRN IV AFTER MEDS AND 

BLOOD DRAWS;  Start 4/14/20 at 15:00


Sodium Chloride (Normal Saline Flush) 20 ml PRN Q5MIN  PRN IV AFTER MEDS AND 

BLOOD DRAWS;  Start 4/14/20 at 15:00


Sodium Chloride 100 meq/Potassium Phosphate 19 mmol/ Magnesium Sulfate 12 

meq/Calcium Gluconate 15 meq/ Multivitamins 10 ml/Chromium/ Copper/Manganese/ 

Seleni/Zn 0.5 ml/ Insulin Human Regular 40 unit/ Potassium Chloride 20 meq/ 

Total Parenteral Nutrition/Amino Acids/Dextrose/ Fat Emulsion Intravenous 1,400 

ml @  58.333 mls/ hr TPN  CONT IV  Last administered on 4/15/20at 21:20;  Start 

4/15/20 at 22:00;  Stop 4/16/20 at 21:59;  Status DC


Lidocaine HCl (Buffered Lidocaine 1%) 3 ml STK-MED ONCE .ROUTE ;  Start 4/15/20 

at 13:16;  Stop 4/15/20 at 13:16;  Status DC


Lidocaine HCl (Buffered Lidocaine 1%) 6 ml 1X  ONCE INJ  Last administered on 

4/15/20at 13:45;  Start 4/15/20 at 13:30;  Stop 4/15/20 at 13:31;  Status DC


Albumin Human 100 ml @  100 mls/hr 1X  ONCE IV  Last administered on 4/15/20at 

15:41;  Start 4/15/20 at 15:00;  Stop 4/15/20 at 15:59;  Status DC


Albumin Human 50 ml @ 50 mls/hr 1X  ONCE IV  Last administered on 4/15/20at 

15:00;  Start 4/15/20 at 15:00;  Stop 4/15/20 at 15:59;  Status DC


Info (PHARMACY MONITORING -- do not chart) 1 each PRN DAILY  PRN MC SEE 

COMMENTS;  Start 4/16/20 at 11:30;  Status Cancel


Info (PHARMACY MONITORING -- do not chart) 1 each PRN DAILY  PRN MC SEE 

COMMENTS;  Start 4/16/20 at 11:30;  Status UNV


Sodium Chloride 100 meq/Potassium Phosphate 10 mmol/ Magnesium Sulfate 12 

meq/Calcium Gluconate 15 meq/ Multivitamins 10 ml/Chromium/ Copper/Manganese/ 

Seleni/Zn 0.5 ml/ Insulin Human Regular 35 unit/ Potassium Chloride 20 meq/ 

Total Parenteral Nutrition/Amino Acids/Dextrose/ Fat Emulsion Intravenous 1,400 

ml @  58.333 mls/ hr TPN  CONT IV  Last administered on 4/16/20at 22:10;  Start 

4/16/20 at 22:00;  Stop 4/17/20 at 21:59;  Status DC


Sodium Chloride 100 meq/Potassium Phosphate 5 mmol/ Magnesium Sulfate 12 

meq/Calcium Gluconate 15 meq/ Multivitamins 10 ml/Chromium/ Copper/Manganese/ 

Seleni/Zn 0.5 ml/ Insulin Human Regular 35 unit/ Potassium Chloride 20 meq/ 

Total Parenteral Nutrition/Amino Acids/Dextrose/ Fat Emulsion Intravenous 1,400 

ml @  58.333 mls/ hr TPN  CONT IV  Last administered on 4/17/20at 22:59;  Start 

4/17/20 at 22:00;  Stop 4/18/20 at 21:59;  Status DC


Sodium Chloride 1,000 ml @  1,000 mls/hr Q1H PRN IV hypotension;  Start 4/18/20 

at 08:27;  Stop 4/18/20 at 14:26;  Status DC


Albumin Human 200 ml @  200 mls/hr 1X PRN  PRN IV Hypotension Last administered 

on 4/18/20at 09:18;  Start 4/18/20 at 08:30;  Stop 4/18/20 at 14:29;  Status DC


Sodium Chloride 1,000 ml @  400 mls/hr Q2H30M PRN IV PATENCY;  Start 4/18/20 at 

08:27;  Stop 4/18/20 at 20:26;  Status DC


Info (PHARMACY MONITORING -- do not chart) 1 each PRN DAILY  PRN MC SEE 

COMMENTS;  Start 4/18/20 at 08:30;  Status Cancel


Info (PHARMACY MONITORING -- do not chart) 1 each PRN DAILY  PRN MC SEE 

COMMENTS;  Start 4/18/20 at 08:30;  Stop 4/26/20 at 13:10;  Status DC


Sodium Chloride 100 meq/Potassium Chloride 40 meq/ Magnesium Sulfate 15 

meq/Calcium Gluconate 15 meq/ Multivitamins 10 ml/Chromium/ Copper/Manganese/ 

Seleni/Zn 0.5 ml/ Insulin Human Regular 35 unit/ Total Parenteral 

Nutrition/Amino Acids/Dextrose/ Fat Emulsion Intravenous 1,400 ml @  58.333 mls/

hr TPN  CONT IV  Last administered on 4/18/20at 22:00;  Start 4/18/20 at 22:00; 

Stop 4/19/20 at 21:59;  Status DC


Potassium Chloride/Water 100 ml @  100 mls/hr 1X  ONCE IV  Last administered on 

4/18/20at 17:28;  Start 4/18/20 at 14:45;  Stop 4/18/20 at 15:44;  Status DC


Sodium Chloride 100 meq/Potassium Chloride 40 meq/ Magnesium Sulfate 15 

meq/Calcium Gluconate 15 meq/ Multivitamins 10 ml/Chromium/ Copper/Manganese/ 

Seleni/Zn 0.5 ml/ Insulin Human Regular 35 unit/ Total Parenteral 

Nutrition/Amino Acids/Dextrose/ Fat Emulsion Intravenous 1,400 ml @  58.333 mls/

hr TPN  CONT IV  Last administered on 4/19/20at 22:46;  Start 4/19/20 at 22:00; 

Stop 4/20/20 at 21:59;  Status DC


Sodium Chloride 100 meq/Potassium Chloride 40 meq/ Magnesium Sulfate 20 meq/

Calcium Gluconate 15 meq/ Multivitamins 10 ml/Chromium/ Copper/Manganese/ 

Seleni/Zn 0.5 ml/ Insulin Human Regular 35 unit/ Total Parenteral 

Nutrition/Amino Acids/Dextrose/ Fat Emulsion Intravenous 1,400 ml @  58.333 mls/

hr TPN  CONT IV  Last administered on 4/20/20at 22:31;  Start 4/20/20 at 22:00; 

Stop 4/21/20 at 21:59;  Status DC


Fentanyl Citrate (Fentanyl 2ml Vial) 50 mcg PRN Q2HR  PRN IVP PAIN Last 

administered on 4/27/20at 13:32;  Start 4/20/20 at 21:00;  Stop 4/28/20 at 

12:53;  Status DC


Fentanyl Citrate (Fentanyl 2ml Vial) 25 mcg PRN Q2HR  PRN IVP PAIN;  Start 

4/20/20 at 21:00;  Stop 4/28/20 at 12:54;  Status DC


Enoxaparin Sodium (Lovenox 100mg Syringe) 100 mg Q12HR SQ ;  Start 4/21/20 at 

21:00;  Status UNV


Amino Acids/ Glycerin/ Electrolytes 1,000 ml @  75 mls/hr [id number] IV ;  Start 

4/20/20 at 21:15;  Status UNV


Sodium Chloride 1,000 ml @  1,000 mls/hr Q1H PRN IV hypotension;  Start 4/21/20 

at 07:56;  Stop 4/21/20 at 13:55;  Status DC


Albumin Human 200 ml @  200 mls/hr 1X PRN  PRN IV Hypotension Last administered 

on 4/21/20at 08:40;  Start 4/21/20 at 08:00;  Stop 4/21/20 at 13:59;  Status DC


Sodium Chloride 1,000 ml @  400 mls/hr Q2H30M PRN IV PATENCY;  Start 4/21/20 at 

07:56;  Stop 4/21/20 at 19:55;  Status DC


Info (PHARMACY MONITORING -- do not chart) 1 each PRN DAILY  PRN MC SEE 

COMMENTS;  Start 4/21/20 at 08:00;  Status UNV


Info (PHARMACY MONITORING -- do not chart) 1 each PRN DAILY  PRN MC SEE 

COMMENTS;  Start 4/21/20 at 08:00;  Status UNV


Daptomycin 430 mg/ Sodium Chloride 50 ml @  100 mls/hr Q24H IV  Last 

administered on 4/21/20at 12:35;  Start 4/21/20 at 09:00;  Stop 4/21/20 at 

12:49;  Status DC


Sodium Chloride 100 meq/Potassium Chloride 40 meq/ Magnesium Sulfate 20 

meq/Calcium Gluconate 15 meq/ Multivitamins 10 ml/Chromium/ Copper/Manganese/ S

haley/Zn 0.5 ml/ Insulin Human Regular 35 unit/ Total Parenteral Nutrition/Amino

Acids/Dextrose/ Fat Emulsion Intravenous 1,400 ml @  58.333 mls/ hr TPN  CONT IV

 Last administered on 4/21/20at 21:26;  Start 4/21/20 at 22:00;  Stop 4/22/20 at

21:59;  Status DC


Daptomycin 430 mg/ Sodium Chloride 50 ml @  100 mls/hr Q48H IV ;  Start 4/23/20 

at 09:00;  Stop 4/22/20 at 11:55;  Status DC


Sodium Chloride 100 meq/Potassium Chloride 40 meq/ Magnesium Sulfate 20 

meq/Calcium Gluconate 15 meq/ Multivitamins 10 ml/Chromium/ Copper/Manganese/ 

Seleni/Zn 0.5 ml/ Insulin Human Regular 35 unit/ Total Parenteral 

Nutrition/Amino Acids/Dextrose/ Fat Emulsion Intravenous 1,400 ml @  58.333 mls/

hr TPN  CONT IV  Last administered on 4/22/20at 22:27;  Start 4/22/20 at 22:00; 

Stop 4/23/20 at 21:59;  Status DC


Daptomycin 430 mg/ Sodium Chloride 50 ml @  100 mls/hr Q24H IV  Last 

administered on 4/24/20at 15:07;  Start 4/22/20 at 13:00;  Stop 4/25/20 at 

13:15;  Status DC


Sodium Chloride 100 meq/Potassium Chloride 40 meq/ Magnesium Sulfate 20 

meq/Calcium Gluconate 10 meq/ Multivitamins 10 ml/Chromium/ Copper/Manganese/ 

Seleni/Zn 0.5 ml/ Insulin Human Regular 35 unit/ Total Parenteral 

Nutrition/Amino Acids/Dextrose/ Fat Emulsion Intravenous 1,400 ml @  58.333 mls/

hr TPN  CONT IV  Last administered on 4/24/20at 00:06;  Start 4/23/20 at 22:00; 

Stop 4/24/20 at 21:59;  Status DC


Alteplase, Recombinant (Cathflo For Central Catheter Clearance) 1 mg 1X  ONCE 

INT CAT  Last administered on 4/24/20at 11:44;  Start 4/24/20 at 10:45;  Stop 

4/24/20 at 10:46;  Status DC


Ondansetron HCl (Zofran) 4 mg PRN Q6HRS  PRN IV NAUSEA/VOMITING;  Start 4/27/20 

at 07:00;  Stop 4/28/20 at 06:59;  Status DC


Fentanyl Citrate (Fentanyl 2ml Vial) 25 mcg PRN Q5MIN  PRN IV MILD PAIN 1-3;  

Start 4/27/20 at 07:00;  Stop 4/28/20 at 06:59;  Status DC


Fentanyl Citrate (Fentanyl 2ml Vial) 50 mcg PRN Q5MIN  PRN IV MODERATE TO SEVERE

PAIN Last administered on 4/27/20at 10:17;  Start 4/27/20 at 07:00;  Stop 

4/28/20 at 06:59;  Status DC


Ringer's Solution 1,000 ml @  30 mls/hr Q24H IV ;  Start 4/27/20 at 07:00;  Stop

4/27/20 at 18:59;  Status DC


Lidocaine HCl (Xylocaine-Mpf 1% 2ml Vial) 2 ml PRN 1X  PRN ID PRIOR TO IV START;

 Start 4/27/20 at 07:00;  Stop 4/28/20 at 06:59;  Status DC


Prochlorperazine Edisylate (Compazine) 5 mg PACU PRN  PRN IV NAUSEA, MRX1;  

Start 4/27/20 at 07:00;  Stop 4/28/20 at 06:59;  Status DC


Sodium Acetate 50 meq/Potassium Acetate 55 meq/ Magnesium Sulfate 20 meq/Calcium

Gluconate 10 meq/ Multivitamins 10 ml/Chromium/ Copper/Manganese/ Seleni/Zn 0.5 

ml/ Insulin Human Regular 35 unit/ Total Parenteral Nutrition/Amino 

Acids/Dextrose/ Fat Emulsion Intravenous 1,400 ml @  58.333 mls/ hr TPN  CONT IV

;  Start 4/24/20 at 22:00;  Stop 4/24/20 at 14:15;  Status DC


Sodium Acetate 50 meq/Potassium Acetate 55 meq/ Magnesium Sulfate 20 meq/Calcium

Gluconate 10 meq/ Multivitamins 10 ml/Chromium/ Copper/Manganese/ Seleni/Zn 0.5 

ml/ Insulin Human Regular 35 unit/ Total Parenteral Nutrition/Amino 

Acids/Dextrose/ Fat Emulsion Intravenous 1,800 ml @  75 mls/hr TPN  CONT IV  

Last administered on 4/24/20at 22:38;  Start 4/24/20 at 22:00;  Stop 4/25/20 at 

21:59;  Status DC


Sodium Chloride 1,000 ml @  1,000 mls/hr Q1H PRN IV hypotension;  Start 4/24/20 

at 15:31;  Stop 4/24/20 at 21:30;  Status DC


Diphenhydramine HCl (Benadryl) 25 mg 1X PRN  PRN IV ITCHING;  Start 4/24/20 at 

15:45;  Stop 4/25/20 at 15:44;  Status DC


Diphenhydramine HCl (Benadryl) 25 mg 1X PRN  PRN IV ITCHING;  Start 4/24/20 at 

15:45;  Stop 4/25/20 at 15:44;  Status DC


Sodium Chloride 1,000 ml @  400 mls/hr Q2H30M PRN IV PATENCY;  Start 4/24/20 at 

15:31;  Stop 4/25/20 at 03:30;  Status DC


Info (PHARMACY MONITORING -- do not chart) 1 each PRN DAILY  PRN MC SEE 

COMMENTS;  Start 4/24/20 at 15:45;  Stop 5/26/20 at 14:14;  Status DC


Sodium Acetate 50 meq/Potassium Acetate 55 meq/ Magnesium Sulfate 20 meq/Calcium

Gluconate 10 meq/ Multivitamins 10 ml/Chromium/ Copper/Manganese/ Seleni/Zn 0.5 

ml/ Insulin Human Regular 35 unit/ Total Parenteral Nutrition/Amino 

Acids/Dextrose/ Fat Emulsion Intravenous 1,800 ml @  75 mls/hr TPN  CONT IV  

Last administered on 4/25/20at 22:03;  Start 4/25/20 at 22:00;  Stop 4/26/20 at 

21:59;  Status DC


Daptomycin 430 mg/ Sodium Chloride 50 ml @  100 mls/hr Q24H IV  Last 

administered on 4/30/20at 13:00;  Start 4/25/20 at 13:00;  Stop 4/30/20 at 

20:58;  Status DC


Heparin Sodium (Porcine) 1000 unit/Sodium Chloride 1,001 ml @  1,001 mls/hr 1X  

ONCE IRR ;  Start 4/27/20 at 06:00;  Stop 4/27/20 at 06:59;  Status DC


Potassium Acetate 55 meq/Magnesium Sulfate 20 meq/ Calcium Gluconate 10 meq/ 

Multivitamins 10 ml/Chromium/ Copper/Manganese/ Seleni/Zn 0.5 ml/ Insulin Human 

Regular 35 unit/ Total Parenteral Nutrition/Amino Acids/Dextrose/ Fat Emulsion 

Intravenous 1,920 ml @  80 mls/hr TPN  CONT IV  Last administered on 4/26/20at 

22:10;  Start 4/26/20 at 22:00;  Stop 4/27/20 at 21:59;  Status DC


Dexamethasone Sodium Phosphate (Decadron) 4 mg STK-MED ONCE .ROUTE ;  Start 

4/27/20 at 10:56;  Stop 4/27/20 at 10:57;  Status DC


Ondansetron HCl (Zofran) 4 mg STK-MED ONCE .ROUTE ;  Start 4/27/20 at 10:56;  

Stop 4/27/20 at 10:57;  Status DC


Rocuronium Bromide (Zemuron) 50 mg STK-MED ONCE .ROUTE ;  Start 4/27/20 at 

10:56;  Stop 4/27/20 at 10:57;  Status DC


Fentanyl Citrate (Fentanyl 2ml Vial) 100 mcg STK-MED ONCE .ROUTE ;  Start 

4/27/20 at 10:56;  Stop 4/27/20 at 10:57;  Status DC


Bupivacaine HCl/ Epinephrine Bitart (Sensorcain-Epi 0.5%-1:382155 Mpf) 30 ml 

STK-MED ONCE .ROUTE  Last administered on 4/27/20at 12:01;  Start 4/27/20 at 

10:58;  Stop 4/27/20 at 10:58;  Status DC


Cellulose (Surgicel Hemostat 2x14) 1 each STK-MED ONCE .ROUTE ;  Start 4/27/20 

at 10:58;  Stop 4/27/20 at 10:59;  Status DC


Iohexol (Omnipaque 300 Mg/ml) 50 ml STK-MED ONCE .ROUTE ;  Start 4/27/20 at 

10:58;  Stop 4/27/20 at 10:59;  Status DC


Cellulose (Surgicel Hemostat 4x8) 1 each STK-MED ONCE .ROUTE ;  Start 4/27/20 at

10:58;  Stop 4/27/20 at 10:59;  Status DC


Bisacodyl (Dulcolax Supp) 10 mg STK-MED ONCE .ROUTE ;  Start 4/27/20 at 10:59;  

Stop 4/27/20 at 10:59;  Status DC


Heparin Sodium (Porcine) 1000 unit/Sodium Chloride 1,001 ml @  1,001 mls/hr 1X  

ONCE IRR ;  Start 4/27/20 at 12:00;  Stop 4/27/20 at 12:59;  Status DC


Propofol 20 ml @ As Directed STK-MED ONCE IV ;  Start 4/27/20 at 11:05;  Stop 

4/27/20 at 11:05;  Status DC


Sevoflurane (Ultane) 90 ml STK-MED ONCE IH ;  Start 4/27/20 at 11:05;  Stop 

4/27/20 at 11:05;  Status DC


Sevoflurane (Ultane) 60 ml STK-MED ONCE IH ;  Start 4/27/20 at 12:26;  Stop 

4/27/20 at 12:27;  Status DC


Propofol 20 ml @ As Directed STK-MED ONCE IV ;  Start 4/27/20 at 12:26;  Stop 

4/27/20 at 12:27;  Status DC


Phenylephrine HCl (PHENYLEPHRINE in 0.9% NACL PF) 1 mg STK-MED ONCE IV ;  Start 

4/27/20 at 12:34;  Stop 4/27/20 at 12:34;  Status DC


Heparin Sodium (Porcine) (Heparin Sodium) 5,000 unit Q12HR SQ  Last administered

on 5/6/20at 20:57;  Start 4/27/20 at 21:00;  Stop 5/7/20 at 09:59;  Status DC


Sodium Chloride (Normal Saline Flush) 3 ml QSHIFT  PRN IV AFTER MEDS AND BLOOD 

DRAWS;  Start 4/27/20 at 13:45;  Status Cancel


Naloxone HCl (Narcan) 0.4 mg PRN Q2MIN  PRN IV SEE INSTRUCTIONS Last 

administered on 6/6/20at 15:15;  Start 4/27/20 at 13:45;  Stop 7/1/20 at 16:00; 

Status DC


Sodium Chloride 1,000 ml @  25 mls/hr Q24H IV  Last administered on 5/26/20at 

13:37;  Start 4/27/20 at 13:37;  Stop 5/29/20 at 13:09;  Status DC


Naloxone HCl (Narcan) 0.4 mg PRN Q2MIN  PRN IV SEE INSTRUCTIONS;  Start 4/27/20 

at 14:30;  Status UNV


Sodium Chloride 1,000 ml @  25 mls/hr Q24H IV ;  Start 4/27/20 at 14:30;  Status

UNV


Hydromorphone HCl 30 ml @ 0 mls/hr CONT PRN  PRN IV PER PROTOCOL Last 

administered on 5/2/20at 16:08;  Start 4/27/20 at 14:30;  Stop 5/4/20 at 08:55; 

Status DC


Potassium Acetate 55 meq/Magnesium Sulfate 20 meq/ Calcium Gluconate 10 meq/ 

Multivitamins 10 ml/Chromium/ Copper/Manganese/ Seleni/Zn 0.5 ml/ Insulin Human 

Regular 35 unit/ Total Parenteral Nutrition/Amino Acids/Dextrose/ Fat Emulsion 

Intravenous 1,920 ml @  80 mls/hr TPN  CONT IV  Last administered on 4/27/20at 

22:01;  Start 4/27/20 at 22:00;  Stop 4/28/20 at 21:59;  Status DC


Bumetanide (Bumex) 2 mg [id number] IV  Last administered on 5/1/20at 13:50;  Start 

4/28/20 at 14:00;  Stop 5/2/20 at 14:10;  Status DC


Meropenem 1 gm/ Sodium Chloride 100 ml @  200 mls/hr Q8HRS IV  Last administered

on 5/22/20at 05:53;  Start 4/28/20 at 14:00;  Stop 5/22/20 at 09:31;  Status DC


Potassium Acetate 55 meq/Magnesium Sulfate 20 meq/ Calcium Gluconate 10 meq/ 

Multivitamins 10 ml/Chromium/ Copper/Manganese/ Seleni/Zn 0.5 ml/ Insulin Human 

Regular 35 unit/ Total Parenteral Nutrition/Amino Acids/Dextrose/ Fat Emulsion 

Intravenous 1,920 ml @  80 mls/hr TPN  CONT IV  Last administered on 4/28/20at 

22:02;  Start 4/28/20 at 22:00;  Stop 4/29/20 at 21:59;  Status DC


Hydromorphone HCl (Dilaudid Standard PCA) 12 mg STK-MED ONCE IV ;  Start 4/27/20

at 14:35;  Stop 4/28/20 at 13:53;  Status DC


Artificial Tears (Artificial Tears) 1 drop PRN Q15MIN  PRN OU DRY EYE Last adm

inistered on 6/23/20at 21:17;  Start 4/29/20 at 05:30


Hydromorphone HCl (Dilaudid Standard PCA) 12 mg STK-MED ONCE IV ;  Start 4/28/20

at 12:05;  Stop 4/29/20 at 09:15;  Status DC


Potassium Acetate 65 meq/Magnesium Sulfate 20 meq/ Calcium Gluconate 10 meq/ 

Multivitamins 10 ml/Chromium/ Copper/Manganese/ Seleni/Zn 0.5 ml/ Insulin Human 

Regular 30 unit/ Total Parenteral Nutrition/Amino Acids/Dextrose/ Fat Emulsion 

Intravenous 1,920 ml @  80 mls/hr TPN  CONT IV  Last administered on 4/29/20at 

22:22;  Start 4/29/20 at 22:00;  Stop 4/30/20 at 21:59;  Status DC


Cyclobenzaprine HCl (Flexeril) 10 mg PRN Q6HRS  PRN PO MUSCLE SPASMS;  Start 4 /30/20 at 10:45


Potassium Acetate 55 meq/Magnesium Sulfate 20 meq/ Calcium Gluconate 10 meq/ 

Multivitamins 10 ml/Chromium/ Copper/Manganese/ Seleni/Zn 0.5 ml/ Insulin Human 

Regular 30 unit/ Total Parenteral Nutrition/Amino Acids/Dextrose/ Fat Emulsion 

Intravenous 1,920 ml @  80 mls/hr TPN  CONT IV  Last administered on 5/1/20at 

01:00;  Start 4/30/20 at 22:00;  Stop 5/1/20 at 21:59;  Status DC


Magnesium Sulfate 50 ml @ 25 mls/hr 1X  ONCE IV  Last administered on 4/30/20at 

17:18;  Start 4/30/20 at 12:45;  Stop 4/30/20 at 14:44;  Status DC


Potassium Chloride/Water 100 ml @  100 mls/hr 1X  ONCE IV  Last administered on 

5/1/20at 11:27;  Start 5/1/20 at 12:00;  Stop 5/1/20 at 12:59;  Status DC


Hydromorphone HCl (Dilaudid Standard PCA) 12 mg STK-MED ONCE IV ;  Start 4/29/20

at 10:50;  Stop 5/1/20 at 11:02;  Status DC


Hydromorphone HCl (Dilaudid Standard PCA) 12 mg STK-MED ONCE IV ;  Start 4/30/20

at 13:47;  Stop 5/1/20 at 11:03;  Status DC


Potassium Acetate 30 meq/Magnesium Sulfate 20 meq/ Calcium Gluconate 10 meq/ 

Multivitamins 10 ml/Chromium/ Copper/Manganese/ Seleni/Zn 0.5 ml/ Insulin Human 

Regular 30 unit/ Potassium Chloride 30 meq/ Total Parenteral Nutrition/Amino 

Acids/Dextrose/ Fat Emulsion Intravenous 1,920 ml @  80 mls/hr TPN  CONT IV  

Last administered on 5/1/20at 22:34;  Start 5/1/20 at 22:00;  Stop 5/2/20 at 

21:59;  Status DC


Potassium Chloride/Water 100 ml @  100 mls/hr Q1H IV  Last administered on 

5/2/20at 13:05;  Start 5/2/20 at 07:00;  Stop 5/2/20 at 10:59;  Status DC


Magnesium Sulfate 50 ml @ 25 mls/hr 1X  ONCE IV  Last administered on 5/2/20at 

10:34;  Start 5/2/20 at 10:30;  Stop 5/2/20 at 12:29;  Status DC


Potassium Chloride 75 meq/ Magnesium Sulfate 20 meq/Calcium Gluconate 10 meq/ 

Multivitamins 10 ml/Chromium/ Copper/Manganese/ Seleni/Zn 0.5 ml/ Insulin Human 

Regular 30 unit/ Total Parenteral Nutrition/Amino Acids/Dextrose/ Fat Emulsion 

Intravenous 1,920 ml @  80 mls/hr TPN  CONT IV  Last administered on 5/2/20at 

21:51;  Start 5/2/20 at 22:00;  Stop 5/3/20 at 22:00;  Status DC


Potassium Chloride 75 meq/ Magnesium Sulfate 20 meq/Calcium Gluconate 10 meq/ 

Multivitamins 10 ml/Chromium/ Copper/Manganese/ Seleni/Zn 0.5 ml/ Insulin Human 

Regular 25 unit/ Total Parenteral Nutrition/Amino Acids/Dextrose/ Fat Emulsion 

Intravenous 1,920 ml @  80 mls/hr TPN  CONT IV  Last administered on 5/3/20at 

22:04;  Start 5/3/20 at 22:00;  Stop 5/4/20 at 21:59;  Status DC


Hydromorphone HCl (Dilaudid) 0.4 mg PRN Q4HRS  PRN IVP PAIN Last administered on

5/4/20at 10:57;  Start 5/4/20 at 09:00;  Stop 5/4/20 at 18:59;  Status DC


Micafungin Sodium 100 mg/Dextrose 100 ml @  100 mls/hr Q24H IV  Last 

administered on 5/26/20at 12:17;  Start 5/4/20 at 11:00;  Stop 5/27/20 at 09:59;

 Status DC


Daptomycin 485 mg/ Sodium Chloride 50 ml @  100 mls/hr Q24H IV  Last 

administered on 5/11/20at 13:10;  Start 5/4/20 at 11:00;  Stop 5/12/20 at 07:44;

 Status DC


Potassium Chloride 75 meq/ Magnesium Sulfate 15 meq/Calcium Gluconate 8 meq/ 

Multivitamins 10 ml/Chromium/ Copper/Manganese/ Seleni/Zn 0.5 ml/ Insulin Human 

Regular 25 unit/ Total Parenteral Nutrition/Amino Acids/Dextrose/ Fat Emulsion 

Intravenous 1,920 ml @  80 mls/hr TPN  CONT IV  Last administered on 5/4/20at 

23:08;  Start 5/4/20 at 22:00;  Stop 5/5/20 at 21:59;  Status DC


Haloperidol Lactate (Haldol Inj) 3 mg 1X  ONCE IVP  Last administered on 

5/4/20at 14:37;  Start 5/4/20 at 14:30;  Stop 5/4/20 at 14:31;  Status DC


Hydromorphone HCl (Dilaudid) 1 mg PRN Q4HRS  PRN IVP PAIN Last administered on 

5/18/20at 06:25;  Start 5/4/20 at 19:00;  Stop 5/18/20 at 17:10;  Status DC


Potassium Chloride 75 meq/ Magnesium Sulfate 15 meq/Calcium Gluconate 8 meq/ 

Multivitamins 10 ml/Chromium/ Copper/Manganese/ Seleni/Zn 0.5 ml/ Insulin Human 

Regular 20 unit/ Total Parenteral Nutrition/Amino Acids/Dextrose/ Fat Emulsion 

Intravenous 1,920 ml @  80 mls/hr TPN  CONT IV  Last administered on 5/5/20at 

22:10;  Start 5/5/20 at 22:00;  Stop 5/6/20 at 21:59;  Status DC


Lidocaine HCl (Buffered Lidocaine 1%) 3 ml STK-MED ONCE .ROUTE ;  Start 5/6/20 

at 11:31;  Stop 5/6/20 at 11:31;  Status DC


Lidocaine HCl (Buffered Lidocaine 1%) 3 ml STK-MED ONCE .ROUTE ;  Start 5/6/20 

at 12:28;  Stop 5/6/20 at 12:29;  Status DC


Lidocaine HCl (Buffered Lidocaine 1%) 6 ml 1X  ONCE INJ  Last administered on 

5/6/20at 12:53;  Start 5/6/20 at 12:45;  Stop 5/6/20 at 12:46;  Status DC


Potassium Chloride 75 meq/ Magnesium Sulfate 15 meq/Calcium Gluconate 8 meq/ 

Multivitamins 10 ml/Chromium/ Copper/Manganese/ Seleni/Zn 0.5 ml/ Insulin Human 

Regular 20 unit/ Total Parenteral Nutrition/Amino Acids/Dextrose/ Fat Emulsion 

Intravenous 1,920 ml @  80 mls/hr TPN  CONT IV  Last administered on 5/6/20at 

22:00;  Start 5/6/20 at 22:00;  Stop 5/7/20 at 21:59;  Status DC


Potassium Chloride 75 meq/ Magnesium Sulfate 15 meq/Calcium Gluconate 8 meq/ 

Multivitamins 10 ml/Chromium/ Copper/Manganese/ Seleni/Zn 0.5 ml/ Insulin Human 

Regular 15 unit/ Total Parenteral Nutrition/Amino Acids/Dextrose/ Fat Emulsion 

Intravenous 1,920 ml @  80 mls/hr TPN  CONT IV  Last administered on 5/7/20at 

22:28;  Start 5/7/20 at 22:00;  Stop 5/8/20 at 21:59;  Status DC


Vecuronium Bromide (Norcuron Bolus) 6 mg PRN Q6HRS  PRN IV VENT ASYNCHRONY;  

Start 5/7/20 at 19:15;  Stop 5/7/20 at 19:35;  Status DC


Bumetanide (Bumex) 2 mg 1X  ONCE IV  Last administered on 5/7/20at 22:09;  Start

5/7/20 at 19:45;  Stop 5/7/20 at 19:46;  Status DC


Lidocaine HCl (Buffered Lidocaine 1%) 3 ml STK-MED ONCE .ROUTE ;  Start 5/8/20 

at 07:59;  Stop 5/8/20 at 07:59;  Status DC


Midazolam HCl (Versed) 5 mg STK-MED ONCE .ROUTE ;  Start 5/8/20 at 08:36;  Stop 

5/8/20 at 08:36;  Status DC


Fentanyl Citrate (Fentanyl 5ml Vial) 250 mcg STK-MED ONCE .ROUTE ;  Start 5/8/20

at 08:36;  Stop 5/8/20 at 08:37;  Status DC


Lidocaine HCl (Buffered Lidocaine 1%) 3 ml 1X  ONCE IJ  Last administered on 

5/8/20at 09:30;  Start 5/8/20 at 09:15;  Stop 5/8/20 at 09:16;  Status DC


Midazolam HCl (Versed) 5 mg 1X  ONCE IV  Last administered on 5/8/20at 09:30;  

Start 5/8/20 at 09:15;  Stop 5/8/20 at 09:16;  Status DC


Fentanyl Citrate (Fentanyl 5ml Vial) 250 mcg 1X  ONCE IV  Last administered on 

5/8/20at 09:30;  Start 5/8/20 at 09:15;  Stop 5/8/20 at 09:16;  Status DC


Bumetanide (Bumex) 2 mg DAILY IV  Last administered on 5/18/20at 08:07;  Start 

5/8/20 at 10:00;  Stop 5/18/20 at 17:15;  Status DC


Potassium Chloride 75 meq/ Magnesium Sulfate 15 meq/ Multivitamins 10 

ml/Chromium/ Copper/Manganese/ Seleni/Zn 0.5 ml/ Insulin Human Regular 15 unit/ 

Total Parenteral Nutrition/Amino Acids/Dextrose/ Fat Emulsion Intravenous 1,920 

ml @  80 mls/hr TPN  CONT IV  Last administered on 5/8/20at 21:59;  Start 5/8/20

at 22:00;  Stop 5/9/20 at 21:59;  Status DC


Metoclopramide HCl (Reglan Vial) 10 mg PRN Q3HRS  PRN IVP NAUSEA/VOMITING-3rd 

choice Last administered on 5/14/20at 04:25;  Start 5/9/20 at 16:45


Potassium Chloride 75 meq/ Magnesium Sulfate 15 meq/ Multivitamins 10 

ml/Chromium/ Copper/Manganese/ Seleni/Zn 0.5 ml/ Insulin Human Regular 15 unit/ 

Total Parenteral Nutrition/Amino Acids/Dextrose/ Fat Emulsion Intravenous 1,920 

ml @  80 mls/hr TPN  CONT IV  Last administered on 5/9/20at 22:41;  Start 5/9/20

at 22:00;  Stop 5/10/20 at 21:59;  Status DC


Magnesium Sulfate 50 ml @ 25 mls/hr 1X  ONCE IV  Last administered on 5/10/20at 

10:44;  Start 5/10/20 at 09:00;  Stop 5/10/20 at 10:59;  Status DC


Potassium Chloride/Water 100 ml @  100 mls/hr 1X  ONCE IV  Last administered on 

5/10/20at 09:37;  Start 5/10/20 at 09:00;  Stop 5/10/20 at 09:59;  Status DC


Duloxetine HCl (Cymbalta) 30 mg DAILY PO  Last administered on 5/11/20at 09:48; 

Start 5/10/20 at 14:00;  Stop 5/13/20 at 10:25;  Status DC


Potassium Chloride 80 meq/ Magnesium Sulfate 20 meq/ Multivitamins 10 

ml/Chromium/ Copper/Manganese/ Seleni/Zn 0.5 ml/ Insulin Human Regular 15 unit/ 

Total Parenteral Nutrition/Amino Acids/Dextrose/ Fat Emulsion Intravenous 1,920 

ml @  80 mls/hr TPN  CONT IV  Last administered on 5/10/20at 21:42;  Start 

5/10/20 at 22:00;  Stop 5/11/20 at 21:59;  Status DC


Potassium Chloride 80 meq/ Magnesium Sulfate 20 meq/ Multivitamins 10 

ml/Chromium/ Copper/Manganese/ Seleni/Zn 0.5 ml/ Insulin Human Regular 15 unit/ 

Total Parenteral Nutrition/Amino Acids/Dextrose/ Fat Emulsion Intravenous 1,920 

ml @  80 mls/hr TPN  CONT IV  Last administered on 5/11/20at 22:20;  Start 

5/11/20 at 22:00;  Stop 5/12/20 at 21:59;  Status DC


Lidocaine HCl (Buffered Lidocaine 1%) 3 ml STK-MED ONCE .ROUTE ;  Start 5/12/20 

at 09:54;  Stop 5/12/20 at 09:55;  Status DC


Hydromorphone HCl (Dilaudid Standard PCA) 12 mg STK-MED ONCE IV ;  Start 5/1/20 

at 15:50;  Stop 5/12/20 at 11:24;  Status DC


Potassium Chloride 80 meq/ Magnesium Sulfate 20 meq/ Multivitamins 10 

ml/Chromium/ Copper/Manganese/ Seleni/Zn 0.5 ml/ Insulin Human Regular 15 unit/ 

Total Parenteral Nutrition/Amino Acids/Dextrose/ Fat Emulsion Intravenous 1,920 

ml @  80 mls/hr TPN  CONT IV  Last administered on 5/12/20at 21:40;  Start 

5/12/20 at 22:00;  Stop 5/13/20 at 21:59;  Status DC


Lidocaine HCl (Buffered Lidocaine 1%) 6 ml 1X  ONCE INJ  Last administered on 

5/12/20at 14:15;  Start 5/12/20 at 14:15;  Stop 5/12/20 at 14:16;  Status DC


Potassium Chloride 80 meq/ Magnesium Sulfate 20 meq/ Multivitamins 10 

ml/Chromium/ Copper/Manganese/ Seleni/Zn 1 ml/ Insulin Human Regular 15 unit/ 

Total Parenteral Nutrition/Amino Acids/Dextrose/ Fat Emulsion Intravenous 1,920 

ml @  80 mls/hr TPN  CONT IV  Last administered on 5/13/20at 22:04;  Start 

5/13/20 at 22:00;  Stop 5/14/20 at 21:59;  Status DC


Potassium Chloride/Water 100 ml @  100 mls/hr 1X  ONCE IV  Last administered on 

5/14/20at 11:34;  Start 5/14/20 at 11:00;  Stop 5/14/20 at 11:59;  Status DC


Potassium Chloride 90 meq/ Magnesium Sulfate 20 meq/ Multivitamins 10 

ml/Chromium/ Copper/Manganese/ Seleni/Zn 1 ml/ Insulin Human Regular 15 unit/ 

Total Parenteral Nutrition/Amino Acids/Dextrose/ Fat Emulsion Intravenous 1,920 

ml @  80 mls/hr TPN  CONT IV  Last administered on 5/14/20at 22:57;  Start 

5/14/20 at 22:00;  Stop 5/15/20 at 21:59;  Status DC


Potassium Chloride 90 meq/ Magnesium Sulfate 20 meq/ Multivitamins 10 

ml/Chromium/ Copper/Manganese/ Seleni/Zn 1 ml/ Insulin Human Regular 15 unit/ 

Total Parenteral Nutrition/Amino Acids/Dextrose/ Fat Emulsion Intravenous 1,920 

ml @  80 mls/hr TPN  CONT IV  Last administered on 5/15/20at 22:48;  Start 

5/15/20 at 22:00;  Stop 5/16/20 at 21:59;  Status DC


Potassium Chloride 90 meq/ Magnesium Sulfate 20 meq/ Multivitamins 10 m

l/Chromium/ Copper/Manganese/ Seleni/Zn 1 ml/ Insulin Human Regular 15 unit/ 

Total Parenteral Nutrition/Amino Acids/Dextrose/ Fat Emulsion Intravenous 1,890 

ml @  78.75 mls/ hr TPN  CONT IV  Last administered on 5/16/20at 22:15;  Start 

5/16/20 at 22:00;  Stop 5/17/20 at 21:59;  Status DC


Linezolid/Dextrose 300 ml @  300 mls/hr Q12HR IV  Last administered on 5/19/20at

21:08;  Start 5/17/20 at 09:00;  Stop 5/20/20 at 08:11;  Status DC


Daptomycin 450 mg/ Sodium Chloride 50 ml @  100 mls/hr Q24H IV  Last admini

stered on 5/20/20at 09:25;  Start 5/17/20 at 09:00;  Stop 5/21/20 at 08:30;  

Status DC


Potassium Chloride 90 meq/ Magnesium Sulfate 20 meq/ Multivitamins 10 

ml/Chromium/ Copper/Manganese/ Seleni/Zn 1 ml/ Insulin Human Regular 15 unit/ 

Total Parenteral Nutrition/Amino Acids/Dextrose/ Fat Emulsion Intravenous 1,890 

ml @  78.75 mls/ hr TPN  CONT IV  Last administered on 5/17/20at 21:34;  Start 

5/17/20 at 22:00;  Stop 5/18/20 at 21:59;  Status DC


Lorazepam (Ativan Inj) 2 mg STK-MED ONCE .ROUTE ;  Start 5/17/20 at 14:58;  Stop

5/17/20 at 14:58;  Status DC


Metoprolol Tartrate (Lopressor Vial) 5 mg 1X  ONCE IVP  Last administered on 

5/17/20at 15:31;  Start 5/17/20 at 15:15;  Stop 5/17/20 at 15:16;  Status DC


Lorazepam (Ativan Inj) 2 mg 1X  ONCE IVP  Last administered on 5/17/20at 15:30; 

Start 5/17/20 at 15:15;  Stop 5/17/20 at 15:16;  Status DC


Enoxaparin Sodium (Lovenox 40mg Syringe) 40 mg Q24H SQ  Last administered on 

6/5/20at 17:44;  Start 5/17/20 at 17:00;  Stop 6/7/20 at 06:50;  Status DC


Lorazepam (Ativan Inj) 1 mg PRN Q4HRS  PRN IVP ANXIETY / AGITATION MILD-MOD Last

administered on 5/31/20at 15:55;  Start 5/17/20 at 19:15;  Stop 6/2/20 at 11:45;

 Status DC


Lorazepam (Ativan Inj) 2 mg PRN Q4HRS  PRN IVP ANXIETY / AGITATION SEVERE Last 

administered on 6/1/20at 07:55;  Start 5/17/20 at 19:15;  Stop 6/2/20 at 11:45; 

Status DC


Fentanyl Citrate (Fentanyl 2ml Vial) 50 mcg PRN Q4HRS  PRN IVP SEVERE PAIN Last 

administered on 6/13/20at 05:15;  Start 5/18/20 at 13:15;  Stop 6/14/20 at 

09:29;  Status DC


Fentanyl Citrate (Fentanyl 2ml Vial) 25 mcg PRN Q4HRS  PRN IVP MODERATE PAIN 

Last administered on 6/13/20at 00:27;  Start 5/18/20 at 13:15;  Stop 6/14/20 at 

09:30;  Status DC


Potassium Chloride 90 meq/ Magnesium Sulfate 20 meq/ Multivitamins 10 

ml/Chromium/ Copper/Manganese/ Seleni/Zn 1 ml/ Insulin Human Regular 15 unit/ 

Total Parenteral Nutrition/Amino Acids/Dextrose/ Fat Emulsion Intravenous 1,890 

ml @  78.75 mls/ hr TPN  CONT IV  Last administered on 5/18/20at 22:18;  Start 

5/18/20 at 22:00;  Stop 5/19/20 at 21:59;  Status DC


Furosemide (Lasix) 40 mg 1X  ONCE IVP  Last administered on 5/18/20at 21:51;  

Start 5/18/20 at 21:45;  Stop 5/18/20 at 21:48;  Status DC


Albumin Human 100 ml @  100 mls/hr 1X PRN  PRN IV SEE COMMENTS;  Start 5/19/20 

at 01:30


Furosemide (Lasix) 40 mg [id number] IVP  Last administered on 6/3/20at 08:04;  Start 

5/19/20 at 14:00;  Stop 6/3/20 at 13:07;  Status DC


Potassium Chloride 90 meq/ Magnesium Sulfate 20 meq/ Multivitamins 10 

ml/Chromium/ Copper/Manganese/ Seleni/Zn 1 ml/ Insulin Human Regular 15 unit/ T

otal Parenteral Nutrition/Amino Acids/Dextrose/ Fat Emulsion Intravenous 1,800 

ml @  75 mls/hr TPN  CONT IV  Last administered on 5/19/20at 22:31;  Start 

5/19/20 at 22:00;  Stop 5/20/20 at 21:59;  Status DC


Potassium Chloride 90 meq/ Magnesium Sulfate 20 meq/ Multivitamins 10 

ml/Chromium/ Copper/Manganese/ Seleni/Zn 1 ml/ Insulin Human Regular 15 unit/ 

Total Parenteral Nutrition/Amino Acids/Dextrose/ Fat Emulsion Intravenous 1,800 

ml @  75 mls/hr TPN  CONT IV  Last administered on 5/20/20at 22:28;  Start 5/ 20/20 at 22:00;  Stop 5/21/20 at 21:59;  Status DC


Potassium Chloride 110 meq/ Magnesium Sulfate 20 meq/ Multivitamins 10 ml/Chr

omium/ Copper/Manganese/ Seleni/Zn 1 ml/ Insulin Human Regular 15 unit/ Total 

Parenteral Nutrition/Amino Acids/Dextrose/ Fat Emulsion Intravenous 1,800 ml @  

75 mls/hr TPN  CONT IV  Last administered on 5/21/20at 22:01;  Start 5/21/20 at 

22:00;  Stop 5/22/20 at 21:59;  Status DC


Saliva Substitute (Biotene Moisturizing Mouth) 2 spray PRN Q15MIN  PRN PO DRY 

MOUTH;  Start 5/21/20 at 11:00


Potassium Chloride 110 meq/ Magnesium Sulfate 20 meq/ Multivitamins 10 

ml/Chromium/ Copper/Manganese/ Seleni/Zn 1 ml/ Insulin Human Regular 15 unit/ 

Total Parenteral Nutrition/Amino Acids/Dextrose/ Fat Emulsion Intravenous 1,800 

ml @  75 mls/hr TPN  CONT IV  Last administered on 5/22/20at 22:21;  Start 

5/22/20 at 22:00;  Stop 5/23/20 at 21:59;  Status DC


Potassium Chloride 110 meq/ Magnesium Sulfate 20 meq/ Multivitamins 10 

ml/Chromium/ Copper/Manganese/ Seleni/Zn 1 ml/ Insulin Human Regular 15 unit/ T

otal Parenteral Nutrition/Amino Acids/Dextrose/ Fat Emulsion Intravenous 1,800 

ml @  75 mls/hr TPN  CONT IV  Last administered on 5/23/20at 22:04;  Start 

5/23/20 at 22:00;  Stop 5/24/20 at 21:59;  Status DC


Potassium Chloride 110 meq/ Magnesium Sulfate 20 meq/ Multivitamins 10 

ml/Chromium/ Copper/Manganese/ Seleni/Zn 1 ml/ Insulin Human Regular 15 unit/ 

Total Parenteral Nutrition/Amino Acids/Dextrose/ Fat Emulsion Intravenous 1,800 

ml @  75 mls/hr TPN  CONT IV  Last administered on 5/24/20at 22:48;  Start 5 /24/20 at 22:00;  Stop 5/25/20 at 21:59;  Status DC


Potassium Chloride 70 meq/ Magnesium Sulfate 20 meq/ Multivitamins 10 ml/Chr

omium/ Copper/Manganese/ Seleni/Zn 1 ml/ Insulin Human Regular 15 unit/ Total 

Parenteral Nutrition/Amino Acids/Dextrose/ Fat Emulsion Intravenous 1,800 ml @  

75 mls/hr TPN  CONT IV  Last administered on 5/25/20at 21:39;  Start 5/25/20 at 

22:00;  Stop 5/26/20 at 21:59;  Status DC


Meropenem 500 mg/ Sodium Chloride 50 ml @  100 mls/hr Q6HRS IV  Last 

administered on 5/27/20at 06:02;  Start 5/25/20 at 18:00;  Stop 5/27/20 at 

09:59;  Status DC


Barium Sulfate (Varibar Thin Liquid Apple) 148 gm 1X  ONCE PO ;  Start 5/26/20 

at 11:45;  Stop 5/26/20 at 11:49;  Status DC


Potassium Chloride 70 meq/ Magnesium Sulfate 20 meq/ Multivitamins 10 ml/C

hromium/ Copper/Manganese/ Seleni/Zn 1 ml/ Insulin Human Regular 15 unit/ Total 

Parenteral Nutrition/Amino Acids/Dextrose/ Fat Emulsion Intravenous 1,800 ml @  

75 mls/hr TPN  CONT IV  Last administered on 5/26/20at 22:27;  Start 5/26/20 at 

22:00;  Stop 5/27/20 at 21:59;  Status DC


Piperacillin Sod/ Tazobactam Sod 3.375 gm/Sodium Chloride 50 ml @  100 mls/hr 

Q6HRS IV  Last administered on 6/4/20at 06:10;  Start 5/27/20 at 12:00;  Stop 

6/4/20 at 07:26;  Status DC


Potassium Chloride 70 meq/ Magnesium Sulfate 20 meq/ Multivitamins 10 

ml/Chromium/ Copper/Manganese/ Seleni/Zn 1 ml/ Insulin Human Regular 15 unit/ 

Total Parenteral Nutrition/Amino Acids/Dextrose/ Fat Emulsion Intravenous 1,800 

ml @  75 mls/hr TPN  CONT IV  Last administered on 5/27/20at 22:03;  Start 

5/27/20 at 22:00;  Stop 5/28/20 at 21:59;  Status DC


Potassium Chloride 70 meq/ Magnesium Sulfate 20 meq/ Multivitamins 10 ml/C

hromium/ Copper/Manganese/ Seleni/Zn 1 ml/ Insulin Human Regular 15 unit/ Total 

Parenteral Nutrition/Amino Acids/Dextrose/ Fat Emulsion Intravenous 1,800 ml @  

75 mls/hr TPN  CONT IV  Last administered on 5/28/20at 22:33;  Start 5/28/20 at 

22:00;  Stop 5/29/20 at 21:59;  Status DC


Potassium Chloride 70 meq/ Magnesium Sulfate 20 meq/ Multivitamins 10 

ml/Chromium/ Copper/Manganese/ Seleni/Zn 1 ml/ Insulin Human Regular 15 unit/ 

Total Parenteral Nutrition/Amino Acids/Dextrose/ Fat Emulsion Intravenous 1,800 

ml @  75 mls/hr TPN  CONT IV  Last administered on 5/29/20at 23:13;  Start 

5/29/20 at 22:00;  Stop 5/30/20 at 21:59;  Status DC


Potassium Chloride 80 meq/ Magnesium Sulfate 20 meq/ Multivitamins 10 

ml/Chromium/ Copper/Manganese/ Seleni/Zn 1 ml/ Insulin Human Regular 15 unit/ 

Total Parenteral Nutrition/Amino Acids/Dextrose/ Fat Emulsion Intravenous 1,800 

ml @  75 mls/hr TPN  CONT IV  Last administered on 5/30/20at 22:30;  Start 

5/30/20 at 22:00;  Stop 5/31/20 at 21:59;  Status DC


Potassium Chloride 80 meq/ Magnesium Sulfate 20 meq/ Multivitamins 10 

ml/Chromium/ Copper/Manganese/ Seleni/Zn 1 ml/ Insulin Human Regular 15 unit/ 

Total Parenteral Nutrition/Amino Acids/Dextrose/ Fat Emulsion Intravenous 1,800 

ml @  75 mls/hr TPN  CONT IV  Last administered on 5/31/20at 21:54;  Start 

5/31/20 at 22:00;  Stop 6/1/20 at 21:59;  Status DC


Potassium Chloride/Water 100 ml @  100 mls/hr 1X  ONCE IV  Last administered on 

6/1/20at 10:15;  Start 6/1/20 at 10:00;  Stop 6/1/20 at 10:59;  Status DC


Potassium Chloride 90 meq/ Magnesium Sulfate 20 meq/ Multivitamins 10 

ml/Chromium/ Copper/Manganese/ Seleni/Zn 1 ml/ Insulin Human Regular 20 unit/ 

Total Parenteral Nutrition/Amino Acids/Dextrose/ Fat Emulsion Intravenous 1,800 

ml @  75 mls/hr TPN  CONT IV  Last administered on 6/1/20at 22:28;  Start 6/1/20

at 22:00;  Stop 6/2/20 at 21:59;  Status DC


Potassium Chloride 90 meq/ Magnesium Sulfate 20 meq/ Multivitamins 10 

ml/Chromium/ Copper/Manganese/ Seleni/Zn 1 ml/ Insulin Human Regular 20 unit/ 

Total Parenteral Nutrition/Amino Acids/Dextrose/ Fat Emulsion Intravenous 1,800 

ml @  75 mls/hr TPN  CONT IV  Last administered on 6/2/20at 22:08;  Start 6/2/20

at 22:00;  Stop 6/3/20 at 21:59;  Status DC


Lorazepam (Ativan Inj) 0.25 mg PRN Q4HRS  PRN IVP ANXIETY / AGITATION Last 

administered on 6/27/20at 13:37;  Start 6/3/20 at 07:30


Potassium Chloride 90 meq/ Magnesium Sulfate 20 meq/ Multivitamins 10 

ml/Chromium/ Copper/Manganese/ Seleni/Zn 1 ml/ Insulin Human Regular 20 unit/ 

Total Parenteral Nutrition/Amino Acids/Dextrose/ Fat Emulsion Intravenous 1,800 

ml @  75 mls/hr TPN  CONT IV  Last administered on 6/3/20at 23:13;  Start 6/3/20

at 22:00;  Stop 6/4/20 at 21:59;  Status DC


Furosemide (Lasix) 40 mg DAILY IVP  Last administered on 6/5/20at 11:14;  Start 

6/3/20 at 13:30;  Stop 6/7/20 at 09:12;  Status DC


Fluoxetine HCl (PROzac) 20 mg QHS PEG  Last administered on 7/7/20at 21:31;  

Start 6/4/20 at 21:00


Fentanyl (Duragesic 50mcg/ Hr Patch) 1 patch Q72H TD  Last administered on 

6/4/20at 21:22;  Start 6/4/20 at 21:00;  Stop 6/13/20 at 12:00;  Status DC


Potassium Chloride 40 meq/ Potassium Acetate 60 meq/Magnesium Sulfate 10 meq/ 

Multivitamins 10 ml/Chromium/ Copper/Manganese/ Seleni/Zn 1 ml/ Insulin Human 

Regular 20 unit/ Total Parenteral Nutrition/Amino Acids/Dextrose/ Fat Emulsion 

Intravenous 1,800 ml @  75 mls/hr TPN  CONT IV  Last administered on 6/5/20at 

00:03;  Start 6/4/20 at 22:00;  Stop 6/5/20 at 21:59;  Status DC


Potassium Acetate 80 meq/Magnesium Sulfate 5 meq/ Multivitamins 10 ml/Chromium/ 

Copper/Manganese/ Seleni/Zn 1 ml/ Insulin Human Regular 20 unit/ Total 

Parenteral Nutrition/Amino Acids/Dextrose/ Fat Emulsion Intravenous 1,920 ml @  

80 mls/hr TPN  CONT IV  Last administered on 6/5/20at 21:59;  Start 6/5/20 at 

22:00;  Stop 6/6/20 at 21:59;  Status DC


Potassium Acetate 60 meq/Magnesium Sulfate 5 meq/ Multivitamins 10 ml/Chromium/ 

Copper/Manganese/ Seleni/Zn 1 ml/ Insulin Human Regular 30 unit/ Total 

Parenteral Nutrition/Amino Acids/Dextrose/ Fat Emulsion Intravenous 1,920 ml @  

80 mls/hr TPN  CONT IV  Last administered on 6/6/20at 21:54;  Start 6/6/20 at 

22:00;  Stop 6/7/20 at 21:59;  Status DC


Norepinephrine Bitartrate 8 mg/ Dextrose 258 ml @  13.332 mls/ hr CONT  PRN IV 

PER PROTOCOL Last administered on 7/2/20at 09:09;  Start 6/7/20 at 06:30


Albumin Human 500 ml @  125 mls/hr 1X  ONCE IV  Last administered on 6/7/20at 

08:10;  Start 6/7/20 at 08:15;  Stop 6/7/20 at 12:14;  Status DC


Potassium Acetate 40 meq/Magnesium Sulfate 5 meq/ Multivitamins 10 ml/Chromium/ 

Copper/Manganese/ Seleni/Zn 1 ml/ Insulin Human Regular 30 unit/ Total 

Parenteral Nutrition/Amino Acids/Dextrose/ Fat Emulsion Intravenous 1,920 ml @  

80 mls/hr TPN  CONT IV  Last administered on 6/7/20at 22:23;  Start 6/7/20 at 

22:00;  Stop 6/8/20 at 21:59;  Status DC


Meropenem 1 gm/ Sodium Chloride 100 ml @  200 mls/hr Q8HRS IV ;  Start 6/7/20 at

14:00;  Status Cancel


Meropenem 1 gm/ Sodium Chloride 100 ml @  200 mls/hr Q8HRS IV  Last administered

on 6/7/20at 11:04;  Start 6/7/20 at 10:00;  Stop 6/7/20 at 13:00;  Status DC


Meropenem 1 gm/ Sodium Chloride 100 ml @  200 mls/hr Q12HR IV  Last administered

on 6/25/20at 08:27;  Start 6/7/20 at 21:00;  Stop 6/25/20 at 08:56;  Status DC


Sodium Chloride 1,000 ml @  1,000 mls/hr 1X  ONCE IV  Last administered on 

6/7/20at 11:06;  Start 6/7/20 at 10:45;  Stop 6/7/20 at 11:44;  Status DC


Micafungin Sodium 100 mg/Dextrose 100 ml @  100 mls/hr Q24H IV  Last 

administered on 6/24/20at 12:34;  Start 6/7/20 at 11:00;  Stop 6/25/20 at 08:56;

 Status DC


Daptomycin 410 mg/ Sodium Chloride 50 ml @  100 mls/hr Q24H IV  Last 

administered on 6/9/20at 13:33;  Start 6/7/20 at 14:00;  Stop 6/10/20 at 08:30; 

Status DC


Midazolam HCl (Versed) 2 mg STK-MED ONCE .ROUTE ;  Start 6/7/20 at 14:47;  Stop 

6/7/20 at 14:48;  Status DC


Fentanyl Citrate (Fentanyl 2ml Vial) 100 mcg STK-MED ONCE .ROUTE ;  Start 6/7/20

at 14:47;  Stop 6/7/20 at 14:48;  Status DC


Flumazenil (Romazicon) 0.5 mg STK-MED ONCE IV ;  Start 6/7/20 at 14:48;  Stop 

6/7/20 at 14:48;  Status DC


Naloxone HCl (Narcan) 0.4 mg STK-MED ONCE .ROUTE ;  Start 6/7/20 at 14:48;  Stop

6/7/20 at 14:48;  Status DC


Lidocaine HCl (Lidocaine 1% 20ml Vial) 20 ml STK-MED ONCE .ROUTE ;  Start 6/7/20

at 14:48;  Stop 6/7/20 at 14:48;  Status DC


Midazolam HCl (Versed) 2 mg 1X  ONCE IV  Last administered on 6/7/20at 15:28;  

Start 6/7/20 at 15:00;  Stop 6/7/20 at 15:01;  Status DC


Fentanyl Citrate (Fentanyl 2ml Vial) 100 mcg 1X  ONCE IV  Last administered on 

6/7/20at 15:28;  Start 6/7/20 at 15:00;  Stop 6/7/20 at 15:01;  Status DC


Lidocaine HCl (Lidocaine 1% 20ml Vial) 20 ml 1X  ONCE INJ  Last administered on 

6/7/20at 15:30;  Start 6/7/20 at 15:00;  Stop 6/7/20 at 15:01;  Status DC


Sodium Chloride 1,000 ml @  100 mls/hr Q10H IV  Last administered on 6/16/20at 

07:30;  Start 6/7/20 at 20:00;  Stop 6/16/20 at 11:26;  Status DC


Sodium Bicarbonate (Sodium Bicarb Adult 8.4% Syr) 50 meq 1X  ONCE IV  Last 

administered on 6/7/20at 21:47;  Start 6/7/20 at 22:00;  Stop 6/7/20 at 22:01;  

Status DC


Potassium Acetate 40 meq/Magnesium Sulfate 5 meq/ Multivitamins 10 ml/Chromium/ 

Copper/Manganese/ Seleni/Zn 1 ml/ Insulin Human Regular 30 unit/ Total Pare

nteral Nutrition/Amino Acids/Dextrose/ Fat Emulsion Intravenous 1,920 ml @  80 

mls/hr TPN  CONT IV  Last administered on 6/8/20at 22:28;  Start 6/8/20 at 

22:00;  Stop 6/9/20 at 21:59;  Status DC


Sodium Chloride 500 ml @  500 mls/hr 1X  ONCE IV  Last administered on 6/9/20at 

06:39;  Start 6/9/20 at 06:45;  Stop 6/9/20 at 07:44;  Status DC


Potassium Acetate 40 meq/Magnesium Sulfate 5 meq/ Multivitamins 10 ml/Chromium/ 

Copper/Manganese/ Seleni/Zn 1 ml/ Insulin Human Regular 30 unit/ Total 

Parenteral Nutrition/Amino Acids/Dextrose/ Fat Emulsion Intravenous 1,920 ml @  

80 mls/hr TPN  CONT IV  Last administered on 6/9/20at 22:03;  Start 6/9/20 at 

22:00;  Stop 6/10/20 at 21:59;  Status DC


Metoprolol Tartrate (Lopressor Vial) 5 mg PRN Q6HRS  PRN IVP HYPERTENSION Last 

administered on 6/18/20at 10:14;  Start 6/10/20 at 09:00


Potassium Acetate 40 meq/Magnesium Sulfate 5 meq/ Multivitamins 10 ml/Chromium/ 

Copper/Manganese/ Seleni/Zn 1 ml/ Insulin Human Regular 30 unit/ Total 

Parenteral Nutrition/Amino Acids/Dextrose/ Fat Emulsion Intravenous 1,920 ml @  

80 mls/hr TPN  CONT IV  Last administered on 6/10/20at 21:26;  Start 6/10/20 at 

22:00;  Stop 6/11/20 at 21:59;  Status DC


Potassium Acetate 40 meq/Magnesium Sulfate 5 meq/ Multivitamins 10 ml/Chromium/ 

Copper/Manganese/ Seleni/Zn 1 ml/ Insulin Human Regular 30 unit/ Total 

Parenteral Nutrition/Amino Acids/Dextrose/ Fat Emulsion Intravenous 1,920 ml @  

80 mls/hr TPN  CONT IV  Last administered on 6/11/20at 23:23;  Start 6/11/20 at 

22:00;  Stop 6/12/20 at 21:59;  Status DC


Potassium Acetate 40 meq/Magnesium Sulfate 5 meq/ Multivitamins 10 ml/Chromium/ 

Copper/Manganese/ Seleni/Zn 1 ml/ Insulin Human Regular 30 unit/ Total 

Parenteral Nutrition/Amino Acids/Dextrose/ Fat Emulsion Intravenous 1,920 ml @  

80 mls/hr TPN  CONT IV  Last administered on 6/12/20at 21:35;  Start 6/12/20 at 

22:00;  Stop 6/13/20 at 21:59;  Status DC


Furosemide (Lasix) 20 mg 1X  ONCE IVP  Last administered on 6/13/20at 06:26;  

Start 6/13/20 at 06:15;  Stop 6/13/20 at 06:16;  Status DC


Methylprednisolone Sodium Succinate (SOLU-Medrol 125MG VIAL) 125 mg 1X  ONCE IV 

Last administered on 6/13/20at 06:26;  Start 6/13/20 at 06:15;  Stop 6/13/20 at 

06:16;  Status DC


Albuterol/ Ipratropium (Duoneb) 3 ml Q4HRS NEB  Last administered on 7/8/20at 

08:26;  Start 6/13/20 at 08:00


Fentanyl Citrate 30 ml @ 0 mls/hr CONT  PRN IV SEE PROTOCOL Last administered on

7/4/20at 08:03;  Start 6/13/20 at 06:00;  Stop 7/4/20 at 12:42;  Status DC


Propofol 100 ml @ 0 mls/hr CONT  PRN IV SEE PROTOCOL Last administered on 

6/20/20at 23:50;  Start 6/13/20 at 06:00


Fentanyl Citrate (Fentanyl 2ml Vial) 25 mcg PRN Q1HR  PRN IV SEE COMMENTS;  

Start 6/13/20 at 06:00


Fentanyl Citrate (Fentanyl 2ml Vial) 50 mcg PRN Q1HR  PRN IV SEE COMMENTS;  

Start 6/13/20 at 06:00


Chlorhexidine Gluconate (Peridex) 15 ml BID MM ;  Start 6/13/20 at 09:00;  Stop 

6/13/20 at 07:58;  Status DC


Potassium Acetate 40 meq/Magnesium Sulfate 5 meq/ Multivitamins 10 ml/Chromium/ 

Copper/Manganese/ Seleni/Zn 1 ml/ Insulin Human Regular 30 unit/ Total 

Parenteral Nutrition/Amino Acids/Dextrose/ Fat Emulsion Intravenous 1,920 ml @  

80 mls/hr TPN  CONT IV  Last administered on 6/13/20at 21:19;  Start 6/13/20 at 

22:00;  Stop 6/14/20 at 21:59;  Status DC


Acetylcysteine (Mucomyst 20% Resp Treatment) 600 mg BID NEB  Last administered 

on 6/19/20at 09:33;  Start 6/13/20 at 21:00;  Stop 6/19/20 at 10:39;  Status DC


Magnesium Sulfate 100 ml @  25 mls/hr 1X  ONCE IV  Last administered on 

6/13/20at 15:48;  Start 6/13/20 at 15:45;  Stop 6/13/20 at 19:44;  Status DC


Potassium Acetate 40 meq/Magnesium Sulfate 5 meq/ Multivitamins 10 ml/Chromium/ 

Copper/Manganese/ Seleni/Zn 1 ml/ Insulin Human Regular 30 unit/ Total 

Parenteral Nutrition/Amino Acids/Dextrose/ Fat Emulsion Intravenous 1,920 ml @  

80 mls/hr TPN  CONT IV  Last administered on 6/14/20at 21:35;  Start 6/14/20 at 

22:00;  Stop 6/15/20 at 21:59;  Status DC


Potassium Chloride/Water 100 ml @  100 mls/hr Q1H IV  Last administered on 

6/15/20at 08:31;  Start 6/15/20 at 07:00;  Stop 6/15/20 at 08:59;  Status DC


Potassium Acetate 40 meq/Magnesium Sulfate 5 meq/ Multivitamins 10 ml/Chromium/ 

Copper/Manganese/ Seleni/Zn 1 ml/ Insulin Human Regular 30 unit/ Total 

Parenteral Nutrition/Amino Acids/Dextrose/ Fat Emulsion Intravenous 1,920 ml @  

80 mls/hr TPN  CONT IV  Last administered on 6/15/20at 21:54;  Start 6/15/20 at 

22:00;  Stop 6/16/20 at 19:34;  Status DC


Lidocaine HCl (Buffered Lidocaine 1%) 3 ml STK-MED ONCE .ROUTE ;  Start 6/15/20 

at 12:14;  Stop 6/15/20 at 12:14;  Status DC


Lidocaine HCl (Buffered Lidocaine 1%) 3 ml 1X  ONCE IJ  Last administered on 

6/15/20at 13:11;  Start 6/15/20 at 13:00;  Stop 6/15/20 at 13:01;  Status DC


Magnesium Sulfate 50 ml @ 25 mls/hr 1X  ONCE IV ;  Start 6/16/20 at 08:15;  Stop

6/16/20 at 10:14;  Status DC


Potassium Acetate 40 meq/Magnesium Sulfate 10 meq/ Multivitamins 10 ml/Chromium/

Copper/Manganese/ Seleni/Zn 1 ml/ Insulin Human Regular 20 unit/ Total 

Parenteral Nutrition/Amino Acids/Dextrose/ Fat Emulsion Intravenous 1,920 ml @  

80 mls/hr TPN  CONT IV  Last administered on 6/16/20at 21:32;  Start 6/16/20 at 

22:00;  Stop 6/17/20 at 21:59;  Status DC


Potassium Chloride/Water 100 ml @  100 mls/hr Q1H IV  Last administered on 

6/17/20at 09:12;  Start 6/17/20 at 08:00;  Stop 6/17/20 at 09:59;  Status DC


Alteplase, Recombinant (Cathflo For Central Catheter Clearance) 4 mg 1X  ONCE 

INT CAT ;  Start 6/17/20 at 09:15;  Stop 6/17/20 at 09:16;  Status UNV


Alteplase, Recombinant (Cathflo For Central Catheter Clearance) 4 mg 1X  ONCE 

INT CAT ;  Start 6/17/20 at 09:15;  Stop 6/17/20 at 09:16;  Status UNV


Alteplase, Recombinant (Cathflo For Central Catheter Clearance) 4 mg 1X  ONCE 

INT CAT ;  Start 6/17/20 at 09:15;  Stop 6/17/20 at 09:16;  Status UNV


Alteplase, Recombinant 4 mg/ Sodium Chloride 20 ml @ 20 mls/hr 1X  ONCE IV  Last

administered on 6/17/20at 10:10;  Start 6/17/20 at 10:00;  Stop 6/17/20 at 

10:59;  Status DC


Alteplase, Recombinant 4 mg/ Sodium Chloride 20 ml @ 20 mls/hr 1X  ONCE IV  Last

administered on 6/17/20at 10:09;  Start 6/17/20 at 10:00;  Stop 6/17/20 at 

10:59;  Status DC


Alteplase, Recombinant 4 mg/ Sodium Chloride 20 ml @ 20 mls/hr 1X  ONCE IV  Last

administered on 6/17/20at 10:09;  Start 6/17/20 at 10:00;  Stop 6/17/20 at 

10:59;  Status DC


Potassium Acetate 60 meq/Magnesium Sulfate 10 meq/ Multivitamins 10 ml/Chromium/

Copper/Manganese/ Seleni/Zn 1 ml/ Insulin Human Regular 20 unit/ Total 

Parenteral Nutrition/Amino Acids/Dextrose/ Fat Emulsion Intravenous 1,920 ml @  

80 mls/hr TPN  CONT IV  Last administered on 6/17/20at 21:55;  Start 6/17/20 at 

22:00;  Stop 6/18/20 at 21:59;  Status DC


Albumin Human 500 ml @  125 mls/hr 1X  ONCE IV  Last administered on 6/18/20at 

12:01;  Start 6/18/20 at 11:15;  Stop 6/18/20 at 15:14;  Status DC


Sodium Chloride 500 ml @  500 mls/hr 1X  ONCE IV  Last administered on 6/18/20at

13:50;  Start 6/18/20 at 11:15;  Stop 6/18/20 at 12:14;  Status DC


Potassium Acetate 60 meq/Magnesium Sulfate 14 meq/ Multivitamins 10 ml/Chromium/

Copper/Manganese/ Seleni/Zn 1 ml/ Insulin Human Regular 20 unit/ Total 

Parenteral Nutrition/Amino Acids/Dextrose/ Fat Emulsion Intravenous 1,920 ml @  

80 mls/hr TPN  CONT IV  Last administered on 6/18/20at 22:26;  Start 6/18/20 at 

22:00;  Stop 6/19/20 at 21:59;  Status DC


Ciprofloxacin/ Dextrose 200 ml @  200 mls/hr Q12HR IV  Last administered on 

6/25/20at 08:27;  Start 6/18/20 at 21:00;  Stop 6/25/20 at 08:56;  Status DC


Albumin Human 250 ml @  62.5 mls/hr 1X  ONCE IV  Last administered on 6/19/20at 

11:09;  Start 6/19/20 at 11:00;  Stop 6/19/20 at 14:59;  Status DC


Furosemide (Lasix) 20 mg 1X  ONCE IVP  Last administered on 6/19/20at 14:52;  

Start 6/19/20 at 10:45;  Stop 6/19/20 at 10:49;  Status DC


Potassium Acetate 60 meq/Magnesium Sulfate 14 meq/ Multivitamins 10 ml/Chromium/

Copper/Manganese/ Seleni/Zn 1 ml/ Insulin Human Regular 15 unit/ Total 

Parenteral Nutrition/Amino Acids/Dextrose/ Fat Emulsion Intravenous 1,920 ml @  

80 mls/hr TPN  CONT IV  Last administered on 6/19/20at 22:08;  Start 6/19/20 at 

22:00;  Stop 6/20/20 at 21:59;  Status DC


Potassium Acetate 60 meq/Magnesium Sulfate 14 meq/ Multivitamins 10 ml/Chromium/

Copper/Manganese/ Seleni/Zn 1 ml/ Insulin Human Regular 15 unit/ Total 

Parenteral Nutrition/Amino Acids/Dextrose/ Fat Emulsion Intravenous 1,920 ml @  

80 mls/hr TPN  CONT IV  Last administered on 6/20/20at 22:12;  Start 6/20/20 at 

22:00;  Stop 6/21/20 at 21:59;  Status DC


Potassium Acetate 60 meq/Magnesium Sulfate 14 meq/ Multivitamins 10 ml/Chromium/

Copper/Manganese/ Seleni/Zn 1 ml/ Insulin Human Regular 15 unit/ Total Paren

teral Nutrition/Amino Acids/Dextrose/ Fat Emulsion Intravenous 1,920 ml @  80 

mls/hr TPN  CONT IV  Last administered on 6/21/20at 22:22;  Start 6/21/20 at 

22:00;  Stop 6/22/20 at 21:59;  Status DC


Furosemide (Lasix) 20 mg 1X  ONCE IVP  Last administered on 6/22/20at 11:07;  

Start 6/22/20 at 10:30;  Stop 6/22/20 at 10:34;  Status DC


Potassium Acetate 60 meq/Magnesium Sulfate 14 meq/ Multivitamins 10 ml/Chromium/

Copper/Manganese/ Seleni/Zn 1 ml/ Insulin Human Regular 15 unit/ Sodium Chloride

20 meq/Total Parenteral Nutrition/Amino Acids/Dextrose/ Fat Emulsion Intravenous

1,920 ml @  80 mls/hr TPN  CONT IV  Last administered on 6/22/20at 21:54;  Start

6/22/20 at 22:00;  Stop 6/23/20 at 21:59;  Status DC


Potassium Acetate 30 meq/Magnesium Sulfate 14 meq/ Multivitamins 10 ml/Chromium/

Copper/Manganese/ Seleni/Zn 1 ml/ Insulin Human Regular 15 unit/ Sodium Chloride

20 meq/Potassium Chloride 30 meq/ Total Parenteral Nutrition/Amino 

Acids/Dextrose/ Fat Emulsion Intravenous 1,920 ml @  80 mls/hr TPN  CONT IV  

Last administered on 6/23/20at 21:46;  Start 6/23/20 at 22:00;  Stop 6/24/20 at 

21:59;  Status DC


Sodium Chloride 80 meq/Potassium Chloride 30 meq/ Potassium Acetate 30 

meq/Magnesium Sulfate 14 meq/ Multivitamins 10 ml/Chromium/ Copper/Manganese/ 

Seleni/Zn 1 ml/ Insulin Human Regular 15 unit/ Total Parenteral Nutrition/Amino 

Acids/Dextrose/ Fat Emulsion Intravenous 1,920 ml @  80 mls/hr TPN  CONT IV  

Last administered on 6/24/20at 22:33;  Start 6/24/20 at 22:00;  Stop 6/25/20 at 

21:59;  Status DC


Furosemide (Lasix) 40 mg 1X  ONCE IVP  Last administered on 6/24/20at 16:27;  

Start 6/24/20 at 15:30;  Stop 6/24/20 at 15:33;  Status DC


Albumin Human 250 ml @  62.5 mls/hr 1X  ONCE IV  Last administered on 6/24/20at 

16:27;  Start 6/24/20 at 15:30;  Stop 6/24/20 at 19:29;  Status DC


Sodium Chloride 80 meq/Potassium Chloride 30 meq/ Potassium Acetate 30 

meq/Magnesium Sulfate 14 meq/ Multivitamins 10 ml/Chromium/ Copper/Manganese/ 

Seleni/Zn 1 ml/ Insulin Human Regular 15 unit/ Total Parenteral Nutrition/Amino 

Acids/Dextrose/ Fat Emulsion Intravenous 1,920 ml @  80 mls/hr TPN  CONT IV  

Last administered on 6/25/20at 22:25;  Start 6/25/20 at 22:00;  Stop 6/26/20 at 

21:59;  Status DC


Sodium Chloride 80 meq/Potassium Chloride 30 meq/ Potassium Acetate 30 

meq/Magnesium Sulfate 14 meq/ Multivitamins 10 ml/Chromium/ Copper/Manganese/ 

Seleni/Zn 1 ml/ Insulin Human Regular 15 unit/ Total Parenteral Nutrition/Amino 

Acids/Dextrose/ Fat Emulsion Intravenous 1,920 ml @  80 mls/hr TPN  CONT IV  

Last administered on 6/26/20at 21:32;  Start 6/26/20 at 22:00;  Stop 6/27/20 at 

21:59;  Status DC


Sodium Chloride 80 meq/Potassium Chloride 30 meq/ Potassium Acetate 30 meq/Ma

gnesium Sulfate 14 meq/ Multivitamins 10 ml/Chromium/ Copper/Manganese/ 

Seleni/Zn 1 ml/ Insulin Human Regular 15 unit/ Total Parenteral Nutrition/Amino 

Acids/Dextrose/ Fat Emulsion Intravenous 1,920 ml @  80 mls/hr TPN  CONT IV  

Last administered on 6/27/20at 21:53;  Start 6/27/20 at 22:00;  Stop 6/28/20 at 

21:59;  Status DC


Acetylcysteine (Mucomyst 20% Resp Treatment) 600 mg RTBID NEB  Last administered

on 7/8/20at 08:25;  Start 6/27/20 at 12:00


Sodium Chloride 80 meq/Potassium Chloride 30 meq/ Potassium Acetate 30 

meq/Magnesium Sulfate 14 meq/ Multivitamins 10 ml/Chromium/ Copper/Manganese/ 

Seleni/Zn 1 ml/ Insulin Human Regular 15 unit/ Total Parenteral Nutrition/Amino 

Acids/Dextrose/ Fat Emulsion Intravenous 1,920 ml @  80 mls/hr TPN  CONT IV  

Last administered on 6/28/20at 22:06;  Start 6/28/20 at 22:00;  Stop 6/29/20 at 

21:59;  Status DC


Meropenem 500 mg/ Sodium Chloride 50 ml @  100 mls/hr Q6HRS IV  Last 

administered on 7/8/20at 06:21;  Start 6/28/20 at 18:00


Daptomycin 500 mg/ Sodium Chloride 50 ml @  100 mls/hr Q24H IV  Last 

administered on 7/6/20at 21:47;  Start 6/28/20 at 19:00;  Stop 7/7/20 at 08:13; 

Status DC


Sodium Chloride 80 meq/Potassium Chloride 30 meq/ Potassium Acetate 30 

meq/Magnesium Sulfate 14 meq/ Multivitamins 10 ml/Chromium/ Copper/Manganese/ 

Seleni/Zn 1 ml/ Insulin Human Regular 15 unit/ Total Parenteral Nutrition/Amino 

Acids/Dextrose/ Fat Emulsion Intravenous 1,920 ml @  80 mls/hr TPN  CONT IV  

Last administered on 6/29/20at 22:09;  Start 6/29/20 at 22:00;  Stop 6/30/20 at 

21:59;  Status DC


Heparin Sodium (Porcine) 1000 unit/Sodium Chloride 1,001 ml @  1,001 mls/hr 1X  

ONCE IRR ;  Start 6/30/20 at 06:00;  Stop 6/30/20 at 06:59;  Status DC


Propofol (Diprivan) 200 mg STK-MED ONCE IV ;  Start 6/30/20 at 07:44;  Stop 

6/30/20 at 07:44;  Status DC


Lidocaine HCl (Lidocaine Pf 2% Vial) 5 ml STK-MED ONCE .ROUTE ;  Start 6/30/20 

at 07:44;  Stop 6/30/20 at 07:44;  Status DC


Fentanyl Citrate (Fentanyl 2ml Vial) 100 mcg STK-MED ONCE .ROUTE ;  Start 

6/30/20 at 07:44;  Stop 6/30/20 at 07:44;  Status DC


Rocuronium Bromide (Zemuron) 100 mg STK-MED ONCE .ROUTE ;  Start 6/30/20 at 

07:44;  Stop 6/30/20 at 07:44;  Status DC


Micafungin Sodium 100 mg/Dextrose 100 ml @  100 mls/hr Q24H IV  Last 

administered on 7/8/20at 08:07;  Start 6/30/20 at 08:30


Bupivacaine HCl/ Epinephrine Bitart (Sensorcain-Epi 0.5%-1:[id number] Mpf) 30 ml 

STK-MED ONCE .ROUTE ;  Start 6/30/20 at 08:34;  Stop 6/30/20 at 08:35;  Status 

DC


Iohexol (Omnipaque 300 Mg/ml) 50 ml STK-MED ONCE .ROUTE  Last administered on 

6/30/20at 13:30;  Start 6/30/20 at 08:35;  Stop 6/30/20 at 08:35;  Status DC


Sodium Chloride 80 meq/Potassium Chloride 30 meq/ Potassium Acetate 30 meq/Magne

sium Sulfate 14 meq/ Multivitamins 10 ml/Chromium/ Copper/Manganese/ Seleni/Zn 1

ml/ Insulin Human Regular 15 unit/ Total Parenteral Nutrition/Amino 

Acids/Dextrose/ Fat Emulsion Intravenous 1,920 ml @  80 mls/hr TPN  CONT IV  

Last administered on 7/1/20at 01:22;  Start 6/30/20 at 22:00;  Stop 7/1/20 at 

21:59;  Status DC


Phenylephrine HCl (Ken-Synephrine Inj) 10 mg STK-MED ONCE .ROUTE ;  Start 

6/30/20 at 10:15;  Stop 6/30/20 at 10:15;  Status DC


Desflurane (Suprane) 90 ml STK-MED ONCE IH ;  Start 6/30/20 at 10:18;  Stop 

6/30/20 at 10:19;  Status DC


Albumin Human 500 ml @  As Directed STK-MED ONCE IV ;  Start 6/30/20 at 11:06;  

Stop 6/30/20 at 11:06;  Status DC


Vasopressin (Vasostrict) 20 unit STK-MED ONCE .ROUTE ;  Start 6/30/20 at 12:23; 

Stop 6/30/20 at 12:23;  Status DC


Phenylephrine HCl (Ken-Synephrine Inj) 10 mg STK-MED ONCE .ROUTE ;  Start 

6/30/20 at 13:33;  Stop 6/30/20 at 13:33;  Status DC


Phenylephrine HCl (Ken-Synephrine Inj) 10 mg STK-MED ONCE .ROUTE ;  Start 

6/30/20 at 13:33;  Stop 6/30/20 at 13:33;  Status DC


Ondansetron HCl (Zofran) 4 mg STK-MED ONCE .ROUTE ;  Start 6/30/20 at 13:33;  

Stop 6/30/20 at 13:33;  Status DC


Enoxaparin Sodium (Lovenox 40mg Syringe) 40 mg Q24H SQ  Last administered on 

7/8/20at 08:08;  Start 7/1/20 at 08:00


Sodium Chloride (Normal Saline Flush) 3 ml QSHIFT  PRN IV AFTER MEDS AND BLOOD 

DRAWS;  Start 6/30/20 at 14:45


Naloxone HCl (Narcan) 0.4 mg PRN Q2MIN  PRN IV SEE INSTRUCTIONS;  Start 6/30/20 

at 14:45


Sodium Chloride 1,000 ml @  25 mls/hr Q24H IV  Last administered on 7/7/20at 

21:31;  Start 6/30/20 at 14:33


Morphine Sulfate (Morphine Sulfate) 1 mg PRN Q1HR  PRN IV PAIN;  Start 6/30/20 

at 14:45


Midazolam HCl 100 mg/Sodium Chloride 100 ml @ 1 mls/hr CONT  PRN IV SEE I/O 

RECORD Last administered on 7/3/20at 18:48;  Start 6/30/20 at 14:45


Phenylephrine HCl (PHENYLEPHRINE in 0.9% NACL PF) 1 mg STK-MED ONCE IV ;  Start 

6/30/20 at 14:44;  Stop 6/30/20 at 14:45;  Status DC


Ephedrine Sulfate (ePHEDrine PF IN SALINE SYRINGE) 50 mg STK-MED ONCE IV ;  

Start 6/30/20 at 14:45;  Stop 6/30/20 at 14:45;  Status DC


Vasopressin 20 unit/Dextrose 101 ml @  12 mls/hr CONT  PRN IV SEE I/O RECORD 

Last administered on 7/7/20at 04:17;  Start 6/30/20 at 15:30


Sodium Chloride 1,000 ml @  1,000 mls/hr 1X  ONCE IV  Last administered on 

6/30/20at 15:42;  Start 6/30/20 at 15:45;  Stop 6/30/20 at 16:44;  Status DC


Albumin Human 500 ml @  125 mls/hr 1X  ONCE IV ;  Start 6/30/20 at 16:00;  Stop 

6/30/20 at 19:59;  Status DC


Albumin Human 500 ml @  125 mls/hr PRN Q1HR  PRN IV PER PROTOCOL;  Start 6/30/20

at 15:45


Magnesium Sulfate 50 ml @ 25 mls/hr 1X  ONCE IV  Last administered on 6/30/20at 

17:02;  Start 6/30/20 at 16:30;  Stop 6/30/20 at 18:29;  Status DC


Sodium Bicarbonate (Sodium Bicarb Adult 8.4% Syr) 50 meq STK-MED ONCE .ROUTE ;  

Start 6/30/20 at 16:20;  Stop 6/30/20 at 16:20;  Status DC


Sodium Bicarbonate (Sodium Bicarb Adult 8.4% Syr) 100 meq 1X  ONCE IV  Last 

administered on 6/30/20at 17:07;  Start 6/30/20 at 16:30;  Stop 6/30/20 at 

16:31;  Status DC


Sodium Bicarbonate 150 meq/Dextrose 1,150 ml @  75 mls/hr 1X  ONCE IV  Last 

administered on 6/30/20at 20:02;  Start 6/30/20 at 16:30;  Stop 7/1/20 at 07:49;

 Status DC


Sodium Chloride 80 meq/Potassium Chloride 30 meq/ Potassium Acetate 30 

meq/Magnesium Sulfate 14 meq/ Multivitamins 10 ml/Chromium/ Copper/Manganese/ 

Seleni/Zn 1 ml/ Insulin Human Regular 15 unit/ Total Parenteral Nutrition/Amino 

Acids/Dextrose/ Fat Emulsion Intravenous 1,920 ml @  80 mls/hr TPN  CONT IV  

Last administered on 7/1/20at 23:05;  Start 7/1/20 at 22:00;  Stop 7/2/20 at 

21:59;  Status DC


Sodium Chloride 100 meq/Potassium Chloride 30 meq/ Potassium Acetate 30 

meq/Magnesium Sulfate 12 meq/ Multivitamins 10 ml/Chromium/ Copper/Manganese/ 

Seleni/Zn 1 ml/ Insulin Human Regular 15 unit/ Total Parenteral Nutrition/Amino 

Acids/Dextrose/ Fat Emulsion Intravenous 1,920 ml @  80 mls/hr TPN  CONT IV  

Last administered on 7/2/20at 21:52;  Start 7/2/20 at 22:00;  Stop 7/3/20 at 

21:59;  Status DC


Sodium Chloride 100 meq/Potassium Chloride 30 meq/ Potassium Acetate 30 

meq/Magnesium Sulfate 12 meq/ Multivitamins 10 ml/Chromium/ Copper/Manganese/ 

Seleni/Zn 1 ml/ Insulin Human Regular 15 unit/ Total Parenteral Nutrition/Amino 

Acids/Dextrose/ Fat Emulsion Intravenous 1,920 ml @  80 mls/hr TPN  CONT IV  

Last administered on 7/3/20at 21:46;  Start 7/3/20 at 22:00;  Stop 7/4/20 at 

21:59;  Status DC


Sodium Chloride 100 meq/Potassium Chloride 30 meq/ Potassium Acetate 30 

meq/Magnesium Sulfate 12 meq/ Multivitamins 10 ml/Chromium/ Copper/Manganese/ 

Seleni/Zn 1 ml/ Insulin Human Regular 15 unit/ Total Parenteral Nutrition/Amino 

Acids/Dextrose/ Fat Emulsion Intravenous 1,800 ml @  75 mls/hr TPN  CONT IV  

Last administered on 7/4/20at 22:04;  Start 7/4/20 at 22:00;  Stop 7/5/20 at 

21:59;  Status DC


Fentanyl Citrate 55 ml @ 0 mls/hr CONT  PRN IV SEE COMMENTS Last administered on

7/6/20at 23:55;  Start 7/4/20 at 13:00


Sodium Chloride 100 meq/Potassium Chloride 30 meq/ Potassium Acetate 30 

meq/Magnesium Sulfate 12 meq/ Multivitamins 10 ml/Chromium/ Copper/Manganese/ 

Seleni/Zn 1 ml/ Insulin Human Regular 15 unit/ Total Parenteral Nutrition/Amino 

Acids/Dextrose/ Fat Emulsion Intravenous 1,680 ml @  70 mls/hr TPN  CONT IV  

Last administered on 7/5/20at 21:23;  Start 7/5/20 at 22:00;  Stop 7/6/20 at 

21:59;  Status DC


Sodium Chloride 110 meq/Potassium Chloride 30 meq/ Potassium Acetate 30 

meq/Magnesium Sulfate 15 meq/ Multivitamins 10 ml/Chromium/ Copper/Manganese/ 

Seleni/Zn 1 ml/ Insulin Human Regular 15 unit/ Total Parenteral Nutrition/Amino 

Acids/Dextrose/ Fat Emulsion Intravenous 1,680 ml @  70 mls/hr TPN  CONT IV  

Last administered on 7/6/20at 21:48;  Start 7/6/20 at 22:00;  Stop 7/7/20 at 

21:59;  Status DC


Sodium Chloride 110 meq/Potassium Chloride 30 meq/ Potassium Acetate 30 

meq/Magnesium Sulfate 15 meq/ Multivitamins 10 ml/Chromium/ Copper/Manganese/ 

Seleni/Zn 1 ml/ Insulin Human Regular 15 unit/ Total Parenteral Nutrition/Amino 

Acids/Dextrose/ Fat Emulsion Intravenous 1,680 ml @  70 mls/hr TPN  CONT IV  

Last administered on 7/7/20at 21:33;  Start 7/7/20 at 22:00;  Stop 7/8/20 at 

21:59





Active Scripts


Active


Reported


Bisoprolol Fumarate 5 Mg Tablet 10 Mg PO DAILY


Vitals/I & O





Vital Sign - Last 24 Hours








 7/7/20 7/7/20 7/7/20 7/7/20





 10:00 11:00 12:00 12:00


 


Temp   96.9 





   96.9 


 


Pulse 86 105 112 


 


Resp 14 14 14 


 


B/P (MAP) 131/73 (92) 109/56 (73) 122/79 (93) 


 


Pulse Ox 100 100 99 


 


O2 Delivery Ventilator Ventilator Ventilator Mechanical Ventilator


 


    





    





 7/7/20 7/7/20 7/7/20 7/7/20





 12:05 13:00 14:00 15:00


 


Pulse  107 103 104


 


Resp  14 14 14


 


B/P (MAP)  111/66 (81) 119/76 (90) 119/66 (83)


 


Pulse Ox 100 100 100 99


 


O2 Delivery Ventilator Ventilator Ventilator Ventilator





 7/7/20 7/7/20 7/7/20 7/7/20





 15:16 16:00 16:00 17:00


 


Temp   97.8 





   97.8 


 


Pulse   107 102


 


Resp   14 26


 


B/P (MAP)   110/64 (79) 99/54 (69)


 


Pulse Ox 99  99 98


 


O2 Delivery Ventilator Mechanical Ventilator Ventilator Ventilator


 


    





    





 7/7/20 7/7/20 7/7/20 7/7/20





 18:00 18:10 19:00 20:00


 


Temp    98.5





    98.5


 


Pulse 97  103 107


 


Resp 24  18 21


 


B/P (MAP) 95/52 (66)  104/55 (71) 111/70 (84)


 


Pulse Ox 99 100 99 99


 


O2 Delivery Ventilator Ventilator Ventilator Ventilator


 


    





    





 7/7/20 7/7/20 7/7/20 7/7/20





 20:00 20:21 21:00 22:00


 


Pulse   112 114


 


Resp   17 17


 


B/P (MAP)   131/81 (98) 127/74 (91)


 


Pulse Ox  99 99 99


 


O2 Delivery Mechanical Ventilator Ventilator Ventilator Ventilator





 7/7/20 7/7/20 7/8/20 7/8/20





 23:00 23:43 00:00 00:00


 


Temp   98.3 





   98.3 


 


Pulse 114  103 


 


Resp 22  24 


 


B/P (MAP) 139/55 (83)  107/57 (74) 


 


Pulse Ox 100 99 99 


 


O2 Delivery Ventilator Ventilator Ventilator Mechanical Ventilator


 


    





    





 7/8/20 7/8/20 7/8/20 7/8/20





 01:00 02:00 03:00 03:53


 


Pulse 109 112 112 


 


Resp 24 18 18 


 


B/P (MAP) 135/77 (96) 123/77 (92) 123/77 (92) 


 


Pulse Ox 99 100 100 100


 


O2 Delivery Ventilator Ventilator Ventilator Ventilator





 7/8/20 7/8/20 7/8/20 7/8/20





 04:00 04:00 05:00 06:00


 


Temp  98.7  





  98.7  


 


Pulse  112 112 107


 


Resp  18 23 20


 


B/P (MAP)  133/74 (93) 134/81 (98) 136/70 (92)


 


Pulse Ox  100 100 100


 


O2 Delivery Mechanical Ventilator Ventilator Ventilator Ventilator


 


    





    





 7/8/20 7/8/20  





 07:00 08:27  


 


Pulse 109   


 


Resp 16   


 


B/P (MAP) 140/81 (100)   


 


Pulse Ox 100 100  


 


O2 Delivery Ventilator Ventilator  














Intake and Output   


 


 7/7/20 7/7/20 7/8/20





 15:00 23:00 07:00


 


Intake Total 270 ml 2141.9 ml 833.7 ml


 


Output Total 1370 ml 1310 ml 1170 ml


 


Balance -1100 ml 831.9 ml -336.3 ml











Justicifation of Admission Dx:


Justifications for Admission:


Justification of Admission Dx:  Yes











GAETANO GONCALVES MD         Jul 8, 2020 09:06

## 2020-07-08 NOTE — NUR
Wound Care



Incisional wound vac changed over the midline incision.  Penrose in place with scant 
drainage at base of incision. Periwound draped, contact layer placed over incision line with 
silver foam at 125 mmHg continuous suction. Reinforced placed drain sponges. Pt continues to 
exhibit edema.. Pt on ICU bed, turned to left side with heels floated and all drain tubes 
adjusted. WC will continue to follow for possible changes.

## 2020-07-09 VITALS — SYSTOLIC BLOOD PRESSURE: 141 MMHG | DIASTOLIC BLOOD PRESSURE: 88 MMHG

## 2020-07-09 VITALS — DIASTOLIC BLOOD PRESSURE: 72 MMHG | SYSTOLIC BLOOD PRESSURE: 128 MMHG

## 2020-07-09 VITALS — SYSTOLIC BLOOD PRESSURE: 141 MMHG | DIASTOLIC BLOOD PRESSURE: 86 MMHG

## 2020-07-09 VITALS — DIASTOLIC BLOOD PRESSURE: 80 MMHG | SYSTOLIC BLOOD PRESSURE: 151 MMHG

## 2020-07-09 VITALS — SYSTOLIC BLOOD PRESSURE: 156 MMHG | DIASTOLIC BLOOD PRESSURE: 80 MMHG

## 2020-07-09 VITALS — DIASTOLIC BLOOD PRESSURE: 95 MMHG | SYSTOLIC BLOOD PRESSURE: 151 MMHG

## 2020-07-09 VITALS — SYSTOLIC BLOOD PRESSURE: 159 MMHG | DIASTOLIC BLOOD PRESSURE: 91 MMHG

## 2020-07-09 VITALS — DIASTOLIC BLOOD PRESSURE: 91 MMHG | SYSTOLIC BLOOD PRESSURE: 153 MMHG

## 2020-07-09 VITALS — SYSTOLIC BLOOD PRESSURE: 147 MMHG | DIASTOLIC BLOOD PRESSURE: 97 MMHG

## 2020-07-09 VITALS — SYSTOLIC BLOOD PRESSURE: 152 MMHG | DIASTOLIC BLOOD PRESSURE: 96 MMHG

## 2020-07-09 VITALS — SYSTOLIC BLOOD PRESSURE: 131 MMHG | DIASTOLIC BLOOD PRESSURE: 79 MMHG

## 2020-07-09 VITALS — SYSTOLIC BLOOD PRESSURE: 148 MMHG | DIASTOLIC BLOOD PRESSURE: 85 MMHG

## 2020-07-09 VITALS — DIASTOLIC BLOOD PRESSURE: 69 MMHG | SYSTOLIC BLOOD PRESSURE: 109 MMHG

## 2020-07-09 VITALS — DIASTOLIC BLOOD PRESSURE: 92 MMHG | SYSTOLIC BLOOD PRESSURE: 146 MMHG

## 2020-07-09 VITALS — SYSTOLIC BLOOD PRESSURE: 137 MMHG | DIASTOLIC BLOOD PRESSURE: 93 MMHG

## 2020-07-09 VITALS — DIASTOLIC BLOOD PRESSURE: 71 MMHG | SYSTOLIC BLOOD PRESSURE: 141 MMHG

## 2020-07-09 VITALS — DIASTOLIC BLOOD PRESSURE: 76 MMHG | SYSTOLIC BLOOD PRESSURE: 145 MMHG

## 2020-07-09 VITALS — DIASTOLIC BLOOD PRESSURE: 81 MMHG | SYSTOLIC BLOOD PRESSURE: 136 MMHG

## 2020-07-09 VITALS — SYSTOLIC BLOOD PRESSURE: 125 MMHG | DIASTOLIC BLOOD PRESSURE: 72 MMHG

## 2020-07-09 LAB
ANION GAP SERPL CALC-SCNC: 3 MMOL/L (ref 6–14)
BASOPHILS # BLD AUTO: 0 X10^3/UL (ref 0–0.2)
BASOPHILS NFR BLD: 0 % (ref 0–3)
BUN SERPL-MCNC: 14 MG/DL (ref 7–20)
CALCIUM SERPL-MCNC: 9.8 MG/DL (ref 8.5–10.1)
CHLORIDE SERPL-SCNC: 110 MMOL/L (ref 98–107)
CO2 SERPL-SCNC: 32 MMOL/L (ref 21–32)
CREAT SERPL-MCNC: 0.5 MG/DL (ref 0.6–1)
EOSINOPHIL NFR BLD: 0.2 X10^3/UL (ref 0–0.7)
EOSINOPHIL NFR BLD: 2 % (ref 0–3)
ERYTHROCYTE [DISTWIDTH] IN BLOOD BY AUTOMATED COUNT: 15.1 % (ref 11.5–14.5)
GFR SERPLBLD BASED ON 1.73 SQ M-ARVRAT: 131.1 ML/MIN
GLUCOSE SERPL-MCNC: 150 MG/DL (ref 70–99)
HCT VFR BLD CALC: 23.5 % (ref 36–47)
HGB BLD-MCNC: 7.9 G/DL (ref 12–15.5)
LYMPHOCYTES # BLD: 1.1 X10^3/UL (ref 1–4.8)
LYMPHOCYTES NFR BLD AUTO: 8 % (ref 24–48)
MAGNESIUM SERPL-MCNC: 2 MG/DL (ref 1.8–2.4)
MCH RBC QN AUTO: 30 PG (ref 25–35)
MCHC RBC AUTO-ENTMCNC: 34 G/DL (ref 31–37)
MCV RBC AUTO: 89 FL (ref 79–100)
MONO #: 1 X10^3/UL (ref 0–1.1)
MONOCYTES NFR BLD: 7 % (ref 0–9)
NEUT #: 11.1 X10^3/UL (ref 1.8–7.7)
NEUTROPHILS NFR BLD AUTO: 83 % (ref 31–73)
PHOSPHATE SERPL-MCNC: 3.6 MG/DL (ref 2.6–4.7)
PLATELET # BLD AUTO: 559 X10^3/UL (ref 140–400)
POTASSIUM SERPL-SCNC: 4.2 MMOL/L (ref 3.5–5.1)
RBC # BLD AUTO: 2.64 X10^6/UL (ref 3.5–5.4)
SODIUM SERPL-SCNC: 145 MMOL/L (ref 136–145)
WBC # BLD AUTO: 13.4 X10^3/UL (ref 4–11)

## 2020-07-09 RX ADMIN — ACETYLCYSTEINE SCH MG: 200 INHALANT RESPIRATORY (INHALATION) at 08:25

## 2020-07-09 RX ADMIN — Medication PRN EACH: at 13:13

## 2020-07-09 RX ADMIN — IPRATROPIUM BROMIDE AND ALBUTEROL SULFATE SCH ML: .5; 3 SOLUTION RESPIRATORY (INHALATION) at 04:23

## 2020-07-09 RX ADMIN — MEROPENEM SCH MLS/HR: 500 INJECTION, POWDER, FOR SOLUTION INTRAVENOUS at 06:12

## 2020-07-09 RX ADMIN — ENOXAPARIN SODIUM SCH MG: 40 INJECTION SUBCUTANEOUS at 09:11

## 2020-07-09 RX ADMIN — Medication PRN EACH: at 12:36

## 2020-07-09 RX ADMIN — BACITRACIN SCH MLS/HR: 5000 INJECTION, POWDER, FOR SOLUTION INTRAMUSCULAR at 14:33

## 2020-07-09 RX ADMIN — MEROPENEM SCH MLS/HR: 500 INJECTION, POWDER, FOR SOLUTION INTRAVENOUS at 17:50

## 2020-07-09 RX ADMIN — PANTOPRAZOLE SODIUM SCH MG: 40 INJECTION, POWDER, FOR SOLUTION INTRAVENOUS at 09:15

## 2020-07-09 RX ADMIN — INSULIN LISPRO SCH UNITS: 100 INJECTION, SOLUTION INTRAVENOUS; SUBCUTANEOUS at 06:00

## 2020-07-09 RX ADMIN — CYCLOBENZAPRINE HYDROCHLORIDE PRN MG: 10 TABLET, FILM COATED ORAL at 22:36

## 2020-07-09 RX ADMIN — INSULIN LISPRO SCH UNITS: 100 INJECTION, SOLUTION INTRAVENOUS; SUBCUTANEOUS at 00:00

## 2020-07-09 RX ADMIN — IPRATROPIUM BROMIDE AND ALBUTEROL SULFATE SCH ML: .5; 3 SOLUTION RESPIRATORY (INHALATION) at 19:21

## 2020-07-09 RX ADMIN — ACETYLCYSTEINE SCH MG: 200 INHALANT RESPIRATORY (INHALATION) at 19:21

## 2020-07-09 RX ADMIN — DEXTROSE SCH MLS/HR: 50 INJECTION, SOLUTION INTRAVENOUS at 09:15

## 2020-07-09 RX ADMIN — ONDANSETRON PRN MG: 2 INJECTION INTRAMUSCULAR; INTRAVENOUS at 09:14

## 2020-07-09 RX ADMIN — IPRATROPIUM BROMIDE AND ALBUTEROL SULFATE SCH ML: .5; 3 SOLUTION RESPIRATORY (INHALATION) at 15:13

## 2020-07-09 RX ADMIN — INSULIN LISPRO SCH UNITS: 100 INJECTION, SOLUTION INTRAVENOUS; SUBCUTANEOUS at 12:00

## 2020-07-09 RX ADMIN — ONDANSETRON PRN MG: 2 INJECTION INTRAMUSCULAR; INTRAVENOUS at 22:37

## 2020-07-09 RX ADMIN — MEROPENEM SCH MLS/HR: 500 INJECTION, POWDER, FOR SOLUTION INTRAVENOUS at 12:45

## 2020-07-09 RX ADMIN — MEROPENEM SCH MLS/HR: 500 INJECTION, POWDER, FOR SOLUTION INTRAVENOUS at 00:06

## 2020-07-09 RX ADMIN — IPRATROPIUM BROMIDE AND ALBUTEROL SULFATE SCH ML: .5; 3 SOLUTION RESPIRATORY (INHALATION) at 23:36

## 2020-07-09 RX ADMIN — IPRATROPIUM BROMIDE AND ALBUTEROL SULFATE SCH ML: .5; 3 SOLUTION RESPIRATORY (INHALATION) at 11:50

## 2020-07-09 RX ADMIN — IPRATROPIUM BROMIDE AND ALBUTEROL SULFATE SCH ML: .5; 3 SOLUTION RESPIRATORY (INHALATION) at 08:25

## 2020-07-09 NOTE — PDOC
PULMONARY PROGRESS NOTES


Subjective


Patient on trach shield doing well no respiratory distress


Vitals





Vital Signs








  Date Time  Temp Pulse Resp B/P (MAP) Pulse Ox O2 Delivery O2 Flow Rate FiO2


 


7/9/20 08:20     100 Ventilator  


 


7/9/20 06:00  102 31 136/81 (99)    


 


7/9/20 04:00 98.5       





 98.5       


 


7/9/20 02:00       10.0 








Lungs:  Crackles


Cardiovascular:  S1, S2


Abdomen:  Soft, Non-tender, Other (multiple ROBERT drains )


Extremities:  Other (+1 BLE edema)


Skin:  Warm


Labs





Laboratory Tests








Test


 7/7/20


12:21 7/7/20


17:50 7/8/20


00:45 7/8/20


06:20


 


Glucose (Fingerstick)


 164 mg/dL


(70-99) 135 mg/dL


(70-99) 186 mg/dL


(70-99) 





 


White Blood Count


 


 


 


 15.4 x10^3/uL


(4.0-11.0)


 


Red Blood Count


 


 


 


 2.95 x10^6/uL


(3.50-5.40)


 


Hemoglobin


 


 


 


 8.7 g/dL


(12.0-15.5)


 


Hematocrit


 


 


 


 26.3 %


(36.0-47.0)


 


Mean Corpuscular Volume    89 fL () 


 


Mean Corpuscular Hemoglobin    30 pg (25-35) 


 


Mean Corpuscular Hemoglobin


Concent 


 


 


 33 g/dL


(31-37)


 


Red Cell Distribution Width


 


 


 


 15.1 %


(11.5-14.5)


 


Platelet Count


 


 


 


 597 x10^3/uL


(140-400)


 


Neutrophils (%) (Auto)    83 % (31-73) 


 


Lymphocytes (%) (Auto)    9 % (24-48) 


 


Monocytes (%) (Auto)    7 % (0-9) 


 


Eosinophils (%) (Auto)    1 % (0-3) 


 


Basophils (%) (Auto)    0 % (0-3) 


 


Neutrophils # (Auto)


 


 


 


 12.8 x10^3/uL


(1.8-7.7)


 


Lymphocytes # (Auto)


 


 


 


 1.4 x10^3/uL


(1.0-4.8)


 


Monocytes # (Auto)


 


 


 


 1.0 x10^3/uL


(0.0-1.1)


 


Eosinophils # (Auto)


 


 


 


 0.2 x10^3/uL


(0.0-0.7)


 


Basophils # (Auto)


 


 


 


 0.0 x10^3/uL


(0.0-0.2)


 


Sodium Level


 


 


 


 143 mmol/L


(136-145)


 


Potassium Level


 


 


 


 4.2 mmol/L


(3.5-5.1)


 


Chloride Level


 


 


 


 109 mmol/L


()


 


Carbon Dioxide Level


 


 


 


 31 mmol/L


(21-32)


 


Anion Gap    3 (6-14) 


 


Blood Urea Nitrogen


 


 


 


 15 mg/dL


(7-20)


 


Creatinine


 


 


 


 0.5 mg/dL


(0.6-1.0)


 


Estimated GFR


(Cockcroft-Gault) 


 


 


 131.1 





 


BUN/Creatinine Ratio    30 (6-20) 


 


Glucose Level


 


 


 


 139 mg/dL


(70-99)


 


Calcium Level


 


 


 


 10.2 mg/dL


(8.5-10.1)


 


Total Bilirubin


 


 


 


 0.2 mg/dL


(0.2-1.0)


 


Aspartate Amino Transf


(AST/SGOT) 


 


 


 25 U/L (15-37) 





 


Alanine Aminotransferase


(ALT/SGPT) 


 


 


 37 U/L (14-59) 





 


Alkaline Phosphatase


 


 


 


 239 U/L


()


 


Total Protein


 


 


 


 5.1 g/dL


(6.4-8.2)


 


Albumin


 


 


 


 1.1 g/dL


(3.4-5.0)


 


Albumin/Globulin Ratio    0.3 (1.0-1.7) 


 


Test


 7/8/20


06:24 7/8/20


11:52 7/8/20


17:42 7/9/20


00:05


 


Glucose (Fingerstick)


 134 mg/dL


(70-99) 163 mg/dL


(70-99) 150 mg/dL


(70-99) 144 mg/dL


(70-99)


 


Test


 7/9/20


05:35 7/9/20


05:50 


 





 


White Blood Count


 13.4 x10^3/uL


(4.0-11.0) 


 


 





 


Red Blood Count


 2.64 x10^6/uL


(3.50-5.40) 


 


 





 


Hemoglobin


 7.9 g/dL


(12.0-15.5) 


 


 





 


Hematocrit


 23.5 %


(36.0-47.0) 


 


 





 


Mean Corpuscular Volume 89 fL ()    


 


Mean Corpuscular Hemoglobin 30 pg (25-35)    


 


Mean Corpuscular Hemoglobin


Concent 34 g/dL


(31-37) 


 


 





 


Red Cell Distribution Width


 15.1 %


(11.5-14.5) 


 


 





 


Platelet Count


 559 x10^3/uL


(140-400) 


 


 





 


Neutrophils (%) (Auto) 83 % (31-73)    


 


Lymphocytes (%) (Auto) 8 % (24-48)    


 


Monocytes (%) (Auto) 7 % (0-9)    


 


Eosinophils (%) (Auto) 2 % (0-3)    


 


Basophils (%) (Auto) 0 % (0-3)    


 


Neutrophils # (Auto)


 11.1 x10^3/uL


(1.8-7.7) 


 


 





 


Lymphocytes # (Auto)


 1.1 x10^3/uL


(1.0-4.8) 


 


 





 


Monocytes # (Auto)


 1.0 x10^3/uL


(0.0-1.1) 


 


 





 


Eosinophils # (Auto)


 0.2 x10^3/uL


(0.0-0.7) 


 


 





 


Basophils # (Auto)


 0.0 x10^3/uL


(0.0-0.2) 


 


 





 


Sodium Level


 145 mmol/L


(136-145) 


 


 





 


Potassium Level


 4.2 mmol/L


(3.5-5.1) 


 


 





 


Chloride Level


 110 mmol/L


() 


 


 





 


Carbon Dioxide Level


 32 mmol/L


(21-32) 


 


 





 


Anion Gap 3 (6-14)    


 


Blood Urea Nitrogen


 14 mg/dL


(7-20) 


 


 





 


Creatinine


 0.5 mg/dL


(0.6-1.0) 


 


 





 


Estimated GFR


(Cockcroft-Gault) 131.1 


 


 


 





 


Glucose Level


 150 mg/dL


(70-99) 


 


 





 


Calcium Level


 9.8 mg/dL


(8.5-10.1) 


 


 





 


Phosphorus Level


 3.6 mg/dL


(2.6-4.7) 


 


 





 


Magnesium Level


 2.0 mg/dL


(1.8-2.4) 


 


 





 


Glucose (Fingerstick)


 


 144 mg/dL


(70-99) 


 











Laboratory Tests








Test


 7/8/20


11:52 7/8/20


17:42 7/9/20


00:05 7/9/20


05:35


 


Glucose (Fingerstick)


 163 mg/dL


(70-99) 150 mg/dL


(70-99) 144 mg/dL


(70-99) 





 


White Blood Count


 


 


 


 13.4 x10^3/uL


(4.0-11.0)


 


Red Blood Count


 


 


 


 2.64 x10^6/uL


(3.50-5.40)


 


Hemoglobin


 


 


 


 7.9 g/dL


(12.0-15.5)


 


Hematocrit


 


 


 


 23.5 %


(36.0-47.0)


 


Mean Corpuscular Volume    89 fL () 


 


Mean Corpuscular Hemoglobin    30 pg (25-35) 


 


Mean Corpuscular Hemoglobin


Concent 


 


 


 34 g/dL


(31-37)


 


Red Cell Distribution Width


 


 


 


 15.1 %


(11.5-14.5)


 


Platelet Count


 


 


 


 559 x10^3/uL


(140-400)


 


Neutrophils (%) (Auto)    83 % (31-73) 


 


Lymphocytes (%) (Auto)    8 % (24-48) 


 


Monocytes (%) (Auto)    7 % (0-9) 


 


Eosinophils (%) (Auto)    2 % (0-3) 


 


Basophils (%) (Auto)    0 % (0-3) 


 


Neutrophils # (Auto)


 


 


 


 11.1 x10^3/uL


(1.8-7.7)


 


Lymphocytes # (Auto)


 


 


 


 1.1 x10^3/uL


(1.0-4.8)


 


Monocytes # (Auto)


 


 


 


 1.0 x10^3/uL


(0.0-1.1)


 


Eosinophils # (Auto)


 


 


 


 0.2 x10^3/uL


(0.0-0.7)


 


Basophils # (Auto)


 


 


 


 0.0 x10^3/uL


(0.0-0.2)


 


Sodium Level


 


 


 


 145 mmol/L


(136-145)


 


Potassium Level


 


 


 


 4.2 mmol/L


(3.5-5.1)


 


Chloride Level


 


 


 


 110 mmol/L


()


 


Carbon Dioxide Level


 


 


 


 32 mmol/L


(21-32)


 


Anion Gap    3 (6-14) 


 


Blood Urea Nitrogen


 


 


 


 14 mg/dL


(7-20)


 


Creatinine


 


 


 


 0.5 mg/dL


(0.6-1.0)


 


Estimated GFR


(Cockcroft-Gault) 


 


 


 131.1 





 


Glucose Level


 


 


 


 150 mg/dL


(70-99)


 


Calcium Level


 


 


 


 9.8 mg/dL


(8.5-10.1)


 


Phosphorus Level


 


 


 


 3.6 mg/dL


(2.6-4.7)


 


Magnesium Level


 


 


 


 2.0 mg/dL


(1.8-2.4)


 


Test


 7/9/20


05:50 


 


 





 


Glucose (Fingerstick)


 144 mg/dL


(70-99) 


 


 











Medications





Active Scripts








 Medications  Dose


 Route/Sig


 Max Daily Dose Days Date Category


 


 Bisoprolol


 Fumarate 5 Mg


 Tablet  10 Mg


 PO DAILY


   3/16/20 Reported








Comments


 CXR 6/28/20


IMPRESSION:


No significant interval change compared to 6/25/2020.


 











ct abdomen /pelvis 6/6


1. Removal of the percutaneous pigtail drainage catheters since the prior 


exam. Sequela of pancreatitis with extensive pseudocysts again 


demonstrated, the right-sided collections are slightly larger since the 


prior exam, the left-sided collections are stable. See above.


2. Moderate to large left pleural effusion with atelectasis and collapse 


of most of the left lower lobe, stable. Small right pleural effusion is 


stable.


3. Gallstone.





ct chest 6/15 reviewed








 GRAM NEG COCCOBACILLI:MANY


        SQUAMOUS EPI CELL:RARE


        PMN (WBCs):FEW


        Unless otherwise specified, Testing Performed by:


        [facility]


        [address] [place] [address]


        For Inquires, the Physician may contact the Microbiology


        department at [phone]





  RESPIRATORY CULTURE  Final  


        Final





        MANY GRAM NEGATIVE RODS on 06/15/20 at 1107


        FINAL ID= [PSEUDOMONAS AERUGINOSA]


        MICRO CHARGES


        PSEUDOMONAS AERUGINOSA





  ANTIMICROBIAL SUSCEPTIBILITY  Final  


        Comment





        NEG ANSON 56


        PSEUDOMONAS AERUGINOSA


        ANTIBIOTIC                        RESULT          INTERPRETATION


        AMIKACIN                          <=16                  S


        AZTREONAM                         <=4                   S


        CEFTAZIDIME                       <=1                   S


        CIPROFLOXACIN                     <=0.25                S


        CEFEPIME                          <=2                   S


        CEFTAZIDIME/AVIBACTAM             <=4                   S


        GENTAMICIN                        <=2                   S


        LEVOFLOXACIN                      <=0.5                 S





Impression


.





IMPRESSION:


1.  Acute hypoxemic respiratory failure secondary to ARDS status post trach, 

developed anemia 6/7, blood drainage from RLQ abdomen drain site, and 

surrounding firmness  / developed septic shock 6/7 from abdomen source, required

levo 6/7


s/p 3 new drains 6/7 with brown color drainage,  


2.  Gallstone pancreatitis, now with ongoing bleeding from prior drain. Anemic. 

s/p Tx multiple units over several days


3.  septic shock/sepsis, recurrent 6/7, source abdomen. ,


4.  Acute kidney injury-, Off HD--renal function decling. suspect JED on CKD due

to hypotension , improved now


5.  Acute gallstone pancreatitis.


6.  Hypoalbuminemia.


7.  Moderate persistent effusions, s/p left thora  5/12, reaccumulation of left 

effusion. O2 requirement not changed. 


8.  Fever- ,hypotension. suspect recurrent sepsis/ likely pancreatic source.  

Per ID, per surgery--


9.  Chronic anemia-- ongoing / s/p PRBC


10. Covid 19 testing negative


11. Moderate to large ascites-S/P paracentisis


12.S/P paracentisis with 4 liters removed on 4/15/20


13. S/P IR drain placement on 5/8/2020, removal, re inserted 6/7


14. Depression/Anxiety 


15. Increase effusion, ? loculated/ s/p chest tube.. drainage slowing down. 





6/30


S/P Exploratory laparotomy, lysis of adhesions, subtotal cholecystectomy with 

cholangiogram, gastrojejunostomy tube placement, pancreatic necrosectomy


leukocytosis- improving





Plan


.


We will continue off mechanical ventilation


Follow surgery input


DVT GI prophylaxis


ABX per ID 


Continue TF and TPN for nutrition support 


DVT/GI PPX


D/W RN and RT 








CC time 30 minutes











STEVE MIRANDA MD               Jul 9, 2020 08:39

## 2020-07-09 NOTE — NUR
Pharmacy TPN Dosing Note



S: JESENIA BEAN is a 49 year old F Currently receiving Central Continuous TPN started 
03/18/20



B:Pertinent PMH: Necrotizing pancreatitis

Height: 5 feet, 8 inches

Weight: 94.210473 kg



Current diet: NPO 



LABS:

Sodium:    145 

Potassium: 4.2 

Chloride:  110 

Calcium:   9.8 

Corrected Calcium: 12.12 

Magnesium: 2 

CO2:       32 

SCr:       0.5 

Glucose:   144-150 

Albumin:   1.1 

AST:       25 

ALT:       37 



TPN FORMULA:

TPN TYPE:  Central Continuous

AMINO ACIDS:         40 gm

DEXTROSE:            225 gm

LIPIDS:              20 gm

SODIUM CHLORIDE:     90 mEq

SODIUM ACETATE:      - mEq

SODIUM PHOSPHATE:    - mmol

POTASSIUM CHLORIDE:  30 mEq

POTASSIUM ACETATE:   30 mEq

POTASSIUM PHOSPHATE: - mmol

MAGNESIUM:           15 mEq

CALCIUM:             - mEq

INSULIN:             15 units

MULTIPLE VITAMIN:    10 ml

TRACE ELEMENTS:      1 ml ml(s)



TPN PLAN:  

-Macros adjusted per dietary recommendations to 40 grams AA, 225 grams

Dextrose and 20 grams lipids.

-Sodium, chloride and bicarbonate trending up. Tube feeds held and then

restarted overnight due to abdominal distension. At this time plan to

decrease sodium chloride to 90 mEq/bag.

-Per dietary rec, stop TPN when TF up to 45 ml/hr. Currently

at 20 ml/hr. Plan to continue TPN at this time.

-BMP in AM.





R: Continue TPN with adjustments to macros and slight decrease in sodium chloride. 

Will monitor electrolytes, glucose, and tolerance to TPN.



 BRANDI CORDOBA, [facility], 07/09/20 4550

## 2020-07-09 NOTE — PDOC
Infectious Disease Note


Subjective


Subjective


alert and ok- Nodding


Occ nausea and pain


Remains on ventilator support, FiO2 40% 5 PEEP


TPN


off vasopressin, off levophed





ROS


ROS


difficult to completely obtain





Vital Sign


Vital Signs





Vital Signs








  Date Time  Temp Pulse Resp B/P (MAP) Pulse Ox O2 Delivery O2 Flow Rate FiO2


 


7/9/20 06:00  102 31 136/81 (99) 100 Ventilator  


 


7/9/20 04:00 98.5       





 98.5       


 


7/9/20 02:00       10.0 











Physical Exam


PHYSICAL EXAM


GENERAL: Alert, NAD tired appearing


HEENT: Pupils equal, oral cavity dry. + NGT


NECK:  Tracheostomy 


LUNGS: Diminished aeration bases,  CT on left 


HEART:  S1, S2, regular w/ PVCs 


ABDOMEN: Sightly more distended,  bowel sounds hypoactive, soft, richardson x 2, 3 

ROBERT drains, G-J tube and + wound vac 


: [place] in place 


EXTREMITIES: Generalized edema, no cyanosis. SCDs & Podus boots bilaterally  


SKIN: warm touch. No signs of rash.  


LUE-PICC without signs of complications 


LUE art-line out, mottling left forearm about ole art-line site is improving. RP

palpable, cap refill brisk.


NEURO: alert and some responsive -  tracking





Labs


Lab





Laboratory Tests








Test


 7/8/20


11:52 7/8/20


17:42 7/9/20


00:05 7/9/20


05:35


 


Glucose (Fingerstick)


 163 mg/dL


(70-99) 150 mg/dL


(70-99) 144 mg/dL


(70-99) 





 


White Blood Count


 


 


 


 13.4 x10^3/uL


(4.0-11.0)


 


Red Blood Count


 


 


 


 2.64 x10^6/uL


(3.50-5.40)


 


Hemoglobin


 


 


 


 7.9 g/dL


(12.0-15.5)


 


Hematocrit


 


 


 


 23.5 %


(36.0-47.0)


 


Mean Corpuscular Volume    89 fL () 


 


Mean Corpuscular Hemoglobin    30 pg (25-35) 


 


Mean Corpuscular Hemoglobin


Concent 


 


 


 34 g/dL


(31-37)


 


Red Cell Distribution Width


 


 


 


 15.1 %


(11.5-14.5)


 


Platelet Count


 


 


 


 559 x10^3/uL


(140-400)


 


Neutrophils (%) (Auto)    83 % (31-73) 


 


Lymphocytes (%) (Auto)    8 % (24-48) 


 


Monocytes (%) (Auto)    7 % (0-9) 


 


Eosinophils (%) (Auto)    2 % (0-3) 


 


Basophils (%) (Auto)    0 % (0-3) 


 


Neutrophils # (Auto)


 


 


 


 11.1 x10^3/uL


(1.8-7.7)


 


Lymphocytes # (Auto)


 


 


 


 1.1 x10^3/uL


(1.0-4.8)


 


Monocytes # (Auto)


 


 


 


 1.0 x10^3/uL


(0.0-1.1)


 


Eosinophils # (Auto)


 


 


 


 0.2 x10^3/uL


(0.0-0.7)


 


Basophils # (Auto)


 


 


 


 0.0 x10^3/uL


(0.0-0.2)


 


Sodium Level


 


 


 


 145 mmol/L


(136-145)


 


Potassium Level


 


 


 


 4.2 mmol/L


(3.5-5.1)


 


Chloride Level


 


 


 


 110 mmol/L


()


 


Carbon Dioxide Level


 


 


 


 32 mmol/L


(21-32)


 


Anion Gap    3 (6-14) 


 


Blood Urea Nitrogen


 


 


 


 14 mg/dL


(7-20)


 


Creatinine


 


 


 


 0.5 mg/dL


(0.6-1.0)


 


Estimated GFR


(Cockcroft-Gault) 


 


 


 131.1 





 


Glucose Level


 


 


 


 150 mg/dL


(70-99)


 


Calcium Level


 


 


 


 9.8 mg/dL


(8.5-10.1)


 


Phosphorus Level


 


 


 


 3.6 mg/dL


(2.6-4.7)


 


Magnesium Level


 


 


 


 2.0 mg/dL


(1.8-2.4)


 


Test


 7/9/20


05:50 


 


 





 


Glucose (Fingerstick)


 144 mg/dL


(70-99) 


 


 











Micro


6/7 





GRAM STAIN  Final  


        Final





        GRAM NEGATIVE RODS:MODERATE


        SQUAMOUS EPI CELL:NOT APPLICABLE


        PMN (WBCs):RARE


        YEAST:MODERATE


        Unless otherwise specified, Testing Performed by:


        [address] [address]


        For Inquires, the Physician may contact the Microbiology


        department at [phone]





  ANAEROBIC-AEROBIC CULTURE  Preliminary  


        Preliminary





        MANY GRAM NEGATIVE RODS on 06/09/20 at 115


        FINAL ID= [PSEUDOMONAS AERUGINOSA]


        PSEUDOMONAS AERUGINOSA





  ANTIMICROBIAL SUSCEPTIBILITY  Preliminary  


        Comment





        NEG ANSON 56


        PSEUDOMONAS AERUGINOSA


        ANTIBIOTIC                        RESULT          INTERPRETATION


        AMIKACIN                          <=16                  S


        AZTREONAM                         >16                   R


        CEFTAZIDIME                       >16                   R


        CIPROFLOXACIN                     <=0.25                S


        CEFEPIME                          16                    I


        CEFTAZIDIME/AVIBACTAM             <=4                   S


        GENTAMICIN                        <=2                   S














                               ** CONTINUED ON NEXT PAGE **





---------------------------

-----------------------------------------------------------------





RUN DATE: 06/11/20                  [facility] *LIVE*               

  PAGE 2   


RUN TIME: 1016                            Specimen Inquiry                    


-----------------

---------------------------------------------------------------------------





SPEC: [id number]    PATIENT: JESENIA BEAN                [id number]  

(Continued)


 

--------------------------------------------------------------------------------


------------








 

--------------------------------------------------------------------------------


------------





  Procedure                         Result                                      

         


------------------------------------------------------------------

--------------------------





  ANTIMICROBIAL SUSCEPTIBILITY  Preliminary   (continued)


        LEVOFLOXACIN                      <=0.5                 S


        MEROPENEM                         <=1                   S


        PIPERACILLIN/TAZOBACTAM           64                    S


        TOBRAMYCIN                        <=2                   S


        Unless otherwise specified, Testing Performed by:


        [facility]


        [address] [place] [address]


        For Inquires, the Physician may contact the Microbiology


        department at [phone]











CT Scan 6/6





IMPRESSION:


1. Removal of the percutaneous pigtail drainage catheters since the prior 


exam. Sequela of pancreatitis with extensive pseudocysts again 


demonstrated, the right-sided collections are slightly larger since the 


prior exam, the left-sided collections are stable. See above.


2. Moderate to large left pleural effusion with atelectasis and collapse 


of most of the left lower lobe, stable. Small right pleural effusion is 


stable.


3. Gallstone.





Objective


Assessment


Patient with prolonged hospitalization more than 3 months


Multiple medical problems


Multiple surgical procedures





Off Versed 7/4


S/P Exp. Lap, REN, subtotal cholecystectomy with cholangiogram, G-J tube 

placement & pancreatic necrosectomy on June 30 YEAST/PSA 


Leukocytosis - mild increase today but all parameters are up


Fever intermittently source likely GI


Severe protein malnutrition


Acute gallstone pancreatitis with persistent necrosis


  -CT a/p 4/9.  Increased ascites. Persistent evidence of necrotizing 

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


           - 4/27. status post ROBERT drain placement; C. parapsilosis. s/p drain 

5/6 + yeast & high amylase; s/p additional drain on 5/8. Drains removed. 


           -5/6. fluid  candida parapsilosis fluid, amylase high


           - 6/6 showed multiple pseudocysts, slight larger on the right. s/p 

drains x 3, 6/7.  + PSAE (MDRO-R Cefepime, Zosyn ANSON < 64) and yeast, 


           -6/7 s/p drain replacement x 3; fluid cult PSAE (MDRO), yeast; 

treated


Ascites s/p paracentesis 4/15 & 5/6. C. parapsilosis 


Cholelithiasis with thickening of the gallbladder wall.


JED, Hyperkalemia, Metabolic acidosis off dialysis


Acute hypoxic resp failure. trach/vent. sputum 6/13  + PSAE (I merrem) 


Pleural effusions s/p left thoracentesis, 5/12. no culture. s/p left chest tube,

6/15 no growth


Hypocalcemia 


Prediabetes


HTN


Anemia s/p RBCs





Plan


Plan of Care





PSA from 6/30 I to Meropenem but WBC improving so will try to hold changing abx 

to Cipro and or Avycaz to try and save potential abx options


Monitor Abd distension ? mild increase today


continue merrem and micafungin but


d/c'd Dapto 7/7


Monitor labs in am


wound care /drain management as directed











Contact isolation for CRE/MDRO


Critically ill





D/w nursing











RASHAWN ROSEN MD               Jul 9, 2020 07:23

## 2020-07-09 NOTE — PDOC
SURGICAL PROGRESS NOTE


Subjective


up in chair


Vital Signs





Vital Signs








  Date Time  Temp Pulse Resp B/P (MAP) Pulse Ox O2 Delivery O2 Flow Rate FiO2


 


7/9/20 08:30     100 Tracheal Collar 10.0 


 


7/9/20 06:00  102 31 136/81 (99)    


 


7/9/20 04:00 98.5       





 98.5       








I&O











Intake and Output 


 


 7/9/20





 07:00


 


Intake Total 4245.3 ml


 


Output Total 3630 ml


 


Balance 615.3 ml


 


 


 


IV Total 2346.3 ml


 


Tube Feeding 1549 ml


 


Other 350 ml


 


Output Urine Total 1520 ml


 


Gastric Drainage Total 15 ml


 


Chest Tube Drainage Total 40 ml


 


Drainage Total 2055 ml








PATIENT HAS A VILLASENOR:  Yes


General:  Cooperative, No acute distress


Abdomen:  Soft, Other (multiple drains )


Labs





Laboratory Tests








Test


 7/7/20


12:21 7/7/20


17:50 7/8/20


00:45 7/8/20


06:20


 


Glucose (Fingerstick)


 164 mg/dL


(70-99) 135 mg/dL


(70-99) 186 mg/dL


(70-99) 





 


White Blood Count


 


 


 


 15.4 x10^3/uL


(4.0-11.0)


 


Red Blood Count


 


 


 


 2.95 x10^6/uL


(3.50-5.40)


 


Hemoglobin


 


 


 


 8.7 g/dL


(12.0-15.5)


 


Hematocrit


 


 


 


 26.3 %


(36.0-47.0)


 


Mean Corpuscular Volume    89 fL () 


 


Mean Corpuscular Hemoglobin    30 pg (25-35) 


 


Mean Corpuscular Hemoglobin


Concent 


 


 


 33 g/dL


(31-37)


 


Red Cell Distribution Width


 


 


 


 15.1 %


(11.5-14.5)


 


Platelet Count


 


 


 


 597 x10^3/uL


(140-400)


 


Neutrophils (%) (Auto)    83 % (31-73) 


 


Lymphocytes (%) (Auto)    9 % (24-48) 


 


Monocytes (%) (Auto)    7 % (0-9) 


 


Eosinophils (%) (Auto)    1 % (0-3) 


 


Basophils (%) (Auto)    0 % (0-3) 


 


Neutrophils # (Auto)


 


 


 


 12.8 x10^3/uL


(1.8-7.7)


 


Lymphocytes # (Auto)


 


 


 


 1.4 x10^3/uL


(1.0-4.8)


 


Monocytes # (Auto)


 


 


 


 1.0 x10^3/uL


(0.0-1.1)


 


Eosinophils # (Auto)


 


 


 


 0.2 x10^3/uL


(0.0-0.7)


 


Basophils # (Auto)


 


 


 


 0.0 x10^3/uL


(0.0-0.2)


 


Sodium Level


 


 


 


 143 mmol/L


(136-145)


 


Potassium Level


 


 


 


 4.2 mmol/L


(3.5-5.1)


 


Chloride Level


 


 


 


 109 mmol/L


()


 


Carbon Dioxide Level


 


 


 


 31 mmol/L


(21-32)


 


Anion Gap    3 (6-14) 


 


Blood Urea Nitrogen


 


 


 


 15 mg/dL


(7-20)


 


Creatinine


 


 


 


 0.5 mg/dL


(0.6-1.0)


 


Estimated GFR


(Cockcroft-Gault) 


 


 


 131.1 





 


BUN/Creatinine Ratio    30 (6-20) 


 


Glucose Level


 


 


 


 139 mg/dL


(70-99)


 


Calcium Level


 


 


 


 10.2 mg/dL


(8.5-10.1)


 


Total Bilirubin


 


 


 


 0.2 mg/dL


(0.2-1.0)


 


Aspartate Amino Transf


(AST/SGOT) 


 


 


 25 U/L (15-37) 





 


Alanine Aminotransferase


(ALT/SGPT) 


 


 


 37 U/L (14-59) 





 


Alkaline Phosphatase


 


 


 


 239 U/L


()


 


Total Protein


 


 


 


 5.1 g/dL


(6.4-8.2)


 


Albumin


 


 


 


 1.1 g/dL


(3.4-5.0)


 


Albumin/Globulin Ratio    0.3 (1.0-1.7) 


 


Test


 7/8/20


06:24 7/8/20


11:52 7/8/20


17:42 7/9/20


00:05


 


Glucose (Fingerstick)


 134 mg/dL


(70-99) 163 mg/dL


(70-99) 150 mg/dL


(70-99) 144 mg/dL


(70-99)


 


Test


 7/9/20


05:35 7/9/20


05:50 


 





 


White Blood Count


 13.4 x10^3/uL


(4.0-11.0) 


 


 





 


Red Blood Count


 2.64 x10^6/uL


(3.50-5.40) 


 


 





 


Hemoglobin


 7.9 g/dL


(12.0-15.5) 


 


 





 


Hematocrit


 23.5 %


(36.0-47.0) 


 


 





 


Mean Corpuscular Volume 89 fL ()    


 


Mean Corpuscular Hemoglobin 30 pg (25-35)    


 


Mean Corpuscular Hemoglobin


Concent 34 g/dL


(31-37) 


 


 





 


Red Cell Distribution Width


 15.1 %


(11.5-14.5) 


 


 





 


Platelet Count


 559 x10^3/uL


(140-400) 


 


 





 


Neutrophils (%) (Auto) 83 % (31-73)    


 


Lymphocytes (%) (Auto) 8 % (24-48)    


 


Monocytes (%) (Auto) 7 % (0-9)    


 


Eosinophils (%) (Auto) 2 % (0-3)    


 


Basophils (%) (Auto) 0 % (0-3)    


 


Neutrophils # (Auto)


 11.1 x10^3/uL


(1.8-7.7) 


 


 





 


Lymphocytes # (Auto)


 1.1 x10^3/uL


(1.0-4.8) 


 


 





 


Monocytes # (Auto)


 1.0 x10^3/uL


(0.0-1.1) 


 


 





 


Eosinophils # (Auto)


 0.2 x10^3/uL


(0.0-0.7) 


 


 





 


Basophils # (Auto)


 0.0 x10^3/uL


(0.0-0.2) 


 


 





 


Sodium Level


 145 mmol/L


(136-145) 


 


 





 


Potassium Level


 4.2 mmol/L


(3.5-5.1) 


 


 





 


Chloride Level


 110 mmol/L


() 


 


 





 


Carbon Dioxide Level


 32 mmol/L


(21-32) 


 


 





 


Anion Gap 3 (6-14)    


 


Blood Urea Nitrogen


 14 mg/dL


(7-20) 


 


 





 


Creatinine


 0.5 mg/dL


(0.6-1.0) 


 


 





 


Estimated GFR


(Cockcroft-Gault) 131.1 


 


 


 





 


Glucose Level


 150 mg/dL


(70-99) 


 


 





 


Calcium Level


 9.8 mg/dL


(8.5-10.1) 


 


 





 


Phosphorus Level


 3.6 mg/dL


(2.6-4.7) 


 


 





 


Magnesium Level


 2.0 mg/dL


(1.8-2.4) 


 


 





 


Glucose (Fingerstick)


 


 144 mg/dL


(70-99) 


 











Laboratory Tests








Test


 7/8/20


11:52 7/8/20


17:42 7/9/20


00:05 7/9/20


05:35


 


Glucose (Fingerstick)


 163 mg/dL


(70-99) 150 mg/dL


(70-99) 144 mg/dL


(70-99) 





 


White Blood Count


 


 


 


 13.4 x10^3/uL


(4.0-11.0)


 


Red Blood Count


 


 


 


 2.64 x10^6/uL


(3.50-5.40)


 


Hemoglobin


 


 


 


 7.9 g/dL


(12.0-15.5)


 


Hematocrit


 


 


 


 23.5 %


(36.0-47.0)


 


Mean Corpuscular Volume    89 fL () 


 


Mean Corpuscular Hemoglobin    30 pg (25-35) 


 


Mean Corpuscular Hemoglobin


Concent 


 


 


 34 g/dL


(31-37)


 


Red Cell Distribution Width


 


 


 


 15.1 %


(11.5-14.5)


 


Platelet Count


 


 


 


 559 x10^3/uL


(140-400)


 


Neutrophils (%) (Auto)    83 % (31-73) 


 


Lymphocytes (%) (Auto)    8 % (24-48) 


 


Monocytes (%) (Auto)    7 % (0-9) 


 


Eosinophils (%) (Auto)    2 % (0-3) 


 


Basophils (%) (Auto)    0 % (0-3) 


 


Neutrophils # (Auto)


 


 


 


 11.1 x10^3/uL


(1.8-7.7)


 


Lymphocytes # (Auto)


 


 


 


 1.1 x10^3/uL


(1.0-4.8)


 


Monocytes # (Auto)


 


 


 


 1.0 x10^3/uL


(0.0-1.1)


 


Eosinophils # (Auto)


 


 


 


 0.2 x10^3/uL


(0.0-0.7)


 


Basophils # (Auto)


 


 


 


 0.0 x10^3/uL


(0.0-0.2)


 


Sodium Level


 


 


 


 145 mmol/L


(136-145)


 


Potassium Level


 


 


 


 4.2 mmol/L


(3.5-5.1)


 


Chloride Level


 


 


 


 110 mmol/L


()


 


Carbon Dioxide Level


 


 


 


 32 mmol/L


(21-32)


 


Anion Gap    3 (6-14) 


 


Blood Urea Nitrogen


 


 


 


 14 mg/dL


(7-20)


 


Creatinine


 


 


 


 0.5 mg/dL


(0.6-1.0)


 


Estimated GFR


(Cockcroft-Gault) 


 


 


 131.1 





 


Glucose Level


 


 


 


 150 mg/dL


(70-99)


 


Calcium Level


 


 


 


 9.8 mg/dL


(8.5-10.1)


 


Phosphorus Level


 


 


 


 3.6 mg/dL


(2.6-4.7)


 


Magnesium Level


 


 


 


 2.0 mg/dL


(1.8-2.4)


 


Test


 7/9/20


05:50 


 


 





 


Glucose (Fingerstick)


 144 mg/dL


(70-99) 


 


 











Problem List


Problems


Medical Problems:


(1) Acute pancreatitis


Status: Acute  





(2) Cholelithiasis


Status: Acute  








Assessment/Plan


supportive measures





Justicifation of Admission Dx:


Justifications for Admission:


Justification of Admission Dx:  Yes











SAURAV NASH APRN             Jul 9, 2020 10:55

## 2020-07-09 NOTE — PDOC
PROGRESS NOTES


Chief Complaint


Chief Complaint


A/P


Acute hypoxic Respiratory failure required  mechanical ventilation


Tracheostomy


bilateral pleural effusions/pulm edema s/p Throacentesis on 6/15/2020


Severe Acute gallstone pancreatitis (not a surgical candidate at this time) with

necrosis


Acute kidney failure now requiring dialysis


Gallstones (Calculus of gallbladder with acute cholecystitis without 

obstruction)


HTN 


Intractable pain


Intractable nausea


Covid 19 negative. 


Acute on chronic anemia 


EEG: No seizure activityFever  - better currently - intermittent could be from 

underlying pancreatitis blood cults 5/4 - neg so far


? Ileus with vomiting


Abd distention - U/S and CT reviewed s/p 0.4 L of opaque, debris-containing 

ascites was removed 5/6


Acute pancreatitis with persistent necrosis


Gallstone pancreatitis with necrosis. 


   -CT A/P 6/6 showed multiple pseudocysts, slight larger on the right. s/p 

drains x 3, 6/7.  + PSAE (MDRO-R Cefepime, Zosyn ANSON < 64) and yeast, 


   -s/p drain 4/27. C. parapsilosis. s/p drain 5/6 + yeast & high amylase; s/p 

additional drain on 5/8. Drains removed. 


Ascites s/p paracentesis 4/15 & 5/6. C. parapsilosis 


JED. off HD. 


A large fluid collection in the pancreatic bed has slightly decreased in size, 

described below, the pancreas itself is difficult to  visualize, which could be 

due to necrosis or obscuration of pancreatic  parenchyma from the surrounding 

fluid collection.6/15 


- 4/27 status post ROBERT drain placement + C paropsilosis. s/p additional drains 

5/8


Anemia - S/p PRBCs


Cholelithiasis with thickening of the gallbladder wall.


Leucocytosis improving


JED, hyperkalemia, Metabolic acidosis off dialysis


hypocalcemia 


Prediabetes


HTN


s/p trach


ESRD on HD


Hyperglycemia


severe protein-caloric malnutrition


Moderate to large left pleural effusion with atelectasis and collapse  of most 

of the left lower lobe, stable


 


Dispo - ICU, critically ill


Poor prognosis





History of Present Illness


History of Present Illness


6/8: IR placed drain on 6/7. 4u PRBC after Hb drop. Hb 8.8 today. Off Levophed 

this morning. T-max 100.3. Much more lethargic today. CXR with left sided 

diffuse infiltrates.


6/9: Tachycardic overnight into the 140s. NGT clamped. On BIPAP currently. 

Drains with serosanguinous discharge. WBC 8, Tmax 99.6F.


6/10: Seen on trach shield in ICU.  Hypertensive and tachycardic. Labs stable. 

blood stained drainage from drains. Afebrile.


6/11: Seen on trach shield in ICU. She is a bit confused, drowsy, but when 

sitting up is conversational and confusion somewhat clears. She is asking for 

more pain medication. Stable drains, still very tachy.Na 147


6/12: Patient vomited overnight. Aspirated. Tried to pull her trach out, she was

told she would die without her trach, she said "I know, I just want to go home".

Hb 7.6. Afebrile, still very tachycardic. 1055ml out of right sided ROBERT drain


6/13: Overnight hypoxic, on BIPAP. CXR with left sided white out lung. 

Significant mucous plug suctioned by RT with improvement in her ABG after 2 

hours this morning. Not really active, tired, lethargic. 890ml out of drains 

past 24 hours. On vent. D/w daughter bedside.


6/29: To OR for pancreatic necrosectomy, cholecystectomy, lysis of adhesions, 

gastrostomy tube placement





7/7,  awake on vent, weaning sedation, cont other, still with marked drainage


7/8: Still on fentanyl. WBC 15.4, Hb 8.7 Metabolic panel WNL except calcium 10.2

with albumin 1.1. She awakens off sedation, still connected to vent currently.


 


FiO2 40% 5 PEEP, WBC 13.4, Hb 7.9, platelets 551.  Still on fentanyl, she is 

working with PT.





prognosis still poor, but improving slowly





Vitals


Vitals





Vital Signs








  Date Time  Temp Pulse Resp B/P (MAP) Pulse Ox O2 Delivery O2 Flow Rate FiO2


 


7/9/20 06:00  102 31 136/81 (99) 100 Ventilator  


 


7/9/20 04:00 98.5       





 98.5       


 


7/9/20 02:00       10.0 











Physical Exam


Physical Exam


GENERAL: Alert, NAD tired appearing


HEENT: Pupils equal, oral cavity dry. + NGT


NECK:  Tracheostomy 


LUNGS: Diminished aeration bases,  CT on left 


HEART:  S1, S2, regular w/ PVCs 


ABDOMEN: Sightly Distended,  bowel sounds hypoactive, soft, richardson x 2, 3 ROBERT 

drains, G-J tube and + wound vac 


: [place] in place 


EXTREMITIES: Generalized edema, no cyanosis. SCDs & Podus boots bilaterally  


SKIN: warm touch. No signs of rash.  


LUE-PICC without signs of complications 


LUE art-line out, mottling left forearm about ole art-line site is improving. RP

palpable, cap refill brisk.


NEURO: alert and some responsive -  tracking


General:  Cooperative, No acute distress


Heart:  Regular rate (SR/ST), Other (distant heart sounds)


Lungs:  Crackles


Abdomen:  Soft, Other (multiple drains )


Extremities:  Other (Diffuse edema)


Skin:  No rashes, No significant lesion





Labs


LABS





Laboratory Tests








Test


 7/8/20


11:52 7/8/20


17:42 7/9/20


00:05 7/9/20


05:35


 


Glucose (Fingerstick)


 163 mg/dL


(70-99) 150 mg/dL


(70-99) 144 mg/dL


(70-99) 





 


White Blood Count


 


 


 


 13.4 x10^3/uL


(4.0-11.0)


 


Red Blood Count


 


 


 


 2.64 x10^6/uL


(3.50-5.40)


 


Hemoglobin


 


 


 


 7.9 g/dL


(12.0-15.5)


 


Hematocrit


 


 


 


 23.5 %


(36.0-47.0)


 


Mean Corpuscular Volume    89 fL () 


 


Mean Corpuscular Hemoglobin    30 pg (25-35) 


 


Mean Corpuscular Hemoglobin


Concent 


 


 


 34 g/dL


(31-37)


 


Red Cell Distribution Width


 


 


 


 15.1 %


(11.5-14.5)


 


Platelet Count


 


 


 


 559 x10^3/uL


(140-400)


 


Neutrophils (%) (Auto)    83 % (31-73) 


 


Lymphocytes (%) (Auto)    8 % (24-48) 


 


Monocytes (%) (Auto)    7 % (0-9) 


 


Eosinophils (%) (Auto)    2 % (0-3) 


 


Basophils (%) (Auto)    0 % (0-3) 


 


Neutrophils # (Auto)


 


 


 


 11.1 x10^3/uL


(1.8-7.7)


 


Lymphocytes # (Auto)


 


 


 


 1.1 x10^3/uL


(1.0-4.8)


 


Monocytes # (Auto)


 


 


 


 1.0 x10^3/uL


(0.0-1.1)


 


Eosinophils # (Auto)


 


 


 


 0.2 x10^3/uL


(0.0-0.7)


 


Basophils # (Auto)


 


 


 


 0.0 x10^3/uL


(0.0-0.2)


 


Sodium Level


 


 


 


 145 mmol/L


(136-145)


 


Potassium Level


 


 


 


 4.2 mmol/L


(3.5-5.1)


 


Chloride Level


 


 


 


 110 mmol/L


()


 


Carbon Dioxide Level


 


 


 


 32 mmol/L


(21-32)


 


Anion Gap    3 (6-14) 


 


Blood Urea Nitrogen


 


 


 


 14 mg/dL


(7-20)


 


Creatinine


 


 


 


 0.5 mg/dL


(0.6-1.0)


 


Estimated GFR


(Cockcroft-Gault) 


 


 


 131.1 





 


Glucose Level


 


 


 


 150 mg/dL


(70-99)


 


Calcium Level


 


 


 


 9.8 mg/dL


(8.5-10.1)


 


Phosphorus Level


 


 


 


 3.6 mg/dL


(2.6-4.7)


 


Magnesium Level


 


 


 


 2.0 mg/dL


(1.8-2.4)


 


Test


 7/9/20


05:50 


 


 





 


Glucose (Fingerstick)


 144 mg/dL


(70-99) 


 


 














Assessment and Plan


Assessmemt and Plan


Problems


Medical Problems:


(1) Acute pancreatitis


Status: Acute  





(2) Cholelithiasis


Status: Acute  











Comment


Review of Relevant


I have reviewed the following items josy (where applicable) has been applied.


Labs





Laboratory Tests








Test


 7/7/20


12:21 7/7/20


17:50 7/8/20


00:45 7/8/20


06:20


 


Glucose (Fingerstick)


 164 mg/dL


(70-99) 135 mg/dL


(70-99) 186 mg/dL


(70-99) 





 


White Blood Count


 


 


 


 15.4 x10^3/uL


(4.0-11.0)


 


Red Blood Count


 


 


 


 2.95 x10^6/uL


(3.50-5.40)


 


Hemoglobin


 


 


 


 8.7 g/dL


(12.0-15.5)


 


Hematocrit


 


 


 


 26.3 %


(36.0-47.0)


 


Mean Corpuscular Volume    89 fL () 


 


Mean Corpuscular Hemoglobin    30 pg (25-35) 


 


Mean Corpuscular Hemoglobin


Concent 


 


 


 33 g/dL


(31-37)


 


Red Cell Distribution Width


 


 


 


 15.1 %


(11.5-14.5)


 


Platelet Count


 


 


 


 597 x10^3/uL


(140-400)


 


Neutrophils (%) (Auto)    83 % (31-73) 


 


Lymphocytes (%) (Auto)    9 % (24-48) 


 


Monocytes (%) (Auto)    7 % (0-9) 


 


Eosinophils (%) (Auto)    1 % (0-3) 


 


Basophils (%) (Auto)    0 % (0-3) 


 


Neutrophils # (Auto)


 


 


 


 12.8 x10^3/uL


(1.8-7.7)


 


Lymphocytes # (Auto)


 


 


 


 1.4 x10^3/uL


(1.0-4.8)


 


Monocytes # (Auto)


 


 


 


 1.0 x10^3/uL


(0.0-1.1)


 


Eosinophils # (Auto)


 


 


 


 0.2 x10^3/uL


(0.0-0.7)


 


Basophils # (Auto)


 


 


 


 0.0 x10^3/uL


(0.0-0.2)


 


Sodium Level


 


 


 


 143 mmol/L


(136-145)


 


Potassium Level


 


 


 


 4.2 mmol/L


(3.5-5.1)


 


Chloride Level


 


 


 


 109 mmol/L


()


 


Carbon Dioxide Level


 


 


 


 31 mmol/L


(21-32)


 


Anion Gap    3 (6-14) 


 


Blood Urea Nitrogen


 


 


 


 15 mg/dL


(7-20)


 


Creatinine


 


 


 


 0.5 mg/dL


(0.6-1.0)


 


Estimated GFR


(Cockcroft-Gault) 


 


 


 131.1 





 


BUN/Creatinine Ratio    30 (6-20) 


 


Glucose Level


 


 


 


 139 mg/dL


(70-99)


 


Calcium Level


 


 


 


 10.2 mg/dL


(8.5-10.1)


 


Total Bilirubin


 


 


 


 0.2 mg/dL


(0.2-1.0)


 


Aspartate Amino Transf


(AST/SGOT) 


 


 


 25 U/L (15-37) 





 


Alanine Aminotransferase


(ALT/SGPT) 


 


 


 37 U/L (14-59) 





 


Alkaline Phosphatase


 


 


 


 239 U/L


()


 


Total Protein


 


 


 


 5.1 g/dL


(6.4-8.2)


 


Albumin


 


 


 


 1.1 g/dL


(3.4-5.0)


 


Albumin/Globulin Ratio    0.3 (1.0-1.7) 


 


Test


 7/8/20


06:24 7/8/20


11:52 7/8/20


17:42 7/9/20


00:05


 


Glucose (Fingerstick)


 134 mg/dL


(70-99) 163 mg/dL


(70-99) 150 mg/dL


(70-99) 144 mg/dL


(70-99)


 


Test


 7/9/20


05:35 7/9/20


05:50 


 





 


White Blood Count


 13.4 x10^3/uL


(4.0-11.0) 


 


 





 


Red Blood Count


 2.64 x10^6/uL


(3.50-5.40) 


 


 





 


Hemoglobin


 7.9 g/dL


(12.0-15.5) 


 


 





 


Hematocrit


 23.5 %


(36.0-47.0) 


 


 





 


Mean Corpuscular Volume 89 fL ()    


 


Mean Corpuscular Hemoglobin 30 pg (25-35)    


 


Mean Corpuscular Hemoglobin


Concent 34 g/dL


(31-37) 


 


 





 


Red Cell Distribution Width


 15.1 %


(11.5-14.5) 


 


 





 


Platelet Count


 559 x10^3/uL


(140-400) 


 


 





 


Neutrophils (%) (Auto) 83 % (31-73)    


 


Lymphocytes (%) (Auto) 8 % (24-48)    


 


Monocytes (%) (Auto) 7 % (0-9)    


 


Eosinophils (%) (Auto) 2 % (0-3)    


 


Basophils (%) (Auto) 0 % (0-3)    


 


Neutrophils # (Auto)


 11.1 x10^3/uL


(1.8-7.7) 


 


 





 


Lymphocytes # (Auto)


 1.1 x10^3/uL


(1.0-4.8) 


 


 





 


Monocytes # (Auto)


 1.0 x10^3/uL


(0.0-1.1) 


 


 





 


Eosinophils # (Auto)


 0.2 x10^3/uL


(0.0-0.7) 


 


 





 


Basophils # (Auto)


 0.0 x10^3/uL


(0.0-0.2) 


 


 





 


Sodium Level


 145 mmol/L


(136-145) 


 


 





 


Potassium Level


 4.2 mmol/L


(3.5-5.1) 


 


 





 


Chloride Level


 110 mmol/L


() 


 


 





 


Carbon Dioxide Level


 32 mmol/L


(21-32) 


 


 





 


Anion Gap 3 (6-14)    


 


Blood Urea Nitrogen


 14 mg/dL


(7-20) 


 


 





 


Creatinine


 0.5 mg/dL


(0.6-1.0) 


 


 





 


Estimated GFR


(Cockcroft-Gault) 131.1 


 


 


 





 


Glucose Level


 150 mg/dL


(70-99) 


 


 





 


Calcium Level


 9.8 mg/dL


(8.5-10.1) 


 


 





 


Phosphorus Level


 3.6 mg/dL


(2.6-4.7) 


 


 





 


Magnesium Level


 2.0 mg/dL


(1.8-2.4) 


 


 





 


Glucose (Fingerstick)


 


 144 mg/dL


(70-99) 


 











Laboratory Tests








Test


 7/8/20


11:52 7/8/20


17:42 7/9/20


00:05 7/9/20


05:35


 


Glucose (Fingerstick)


 163 mg/dL


(70-99) 150 mg/dL


(70-99) 144 mg/dL


(70-99) 





 


White Blood Count


 


 


 


 13.4 x10^3/uL


(4.0-11.0)


 


Red Blood Count


 


 


 


 2.64 x10^6/uL


(3.50-5.40)


 


Hemoglobin


 


 


 


 7.9 g/dL


(12.0-15.5)


 


Hematocrit


 


 


 


 23.5 %


(36.0-47.0)


 


Mean Corpuscular Volume    89 fL () 


 


Mean Corpuscular Hemoglobin    30 pg (25-35) 


 


Mean Corpuscular Hemoglobin


Concent 


 


 


 34 g/dL


(31-37)


 


Red Cell Distribution Width


 


 


 


 15.1 %


(11.5-14.5)


 


Platelet Count


 


 


 


 559 x10^3/uL


(140-400)


 


Neutrophils (%) (Auto)    83 % (31-73) 


 


Lymphocytes (%) (Auto)    8 % (24-48) 


 


Monocytes (%) (Auto)    7 % (0-9) 


 


Eosinophils (%) (Auto)    2 % (0-3) 


 


Basophils (%) (Auto)    0 % (0-3) 


 


Neutrophils # (Auto)


 


 


 


 11.1 x10^3/uL


(1.8-7.7)


 


Lymphocytes # (Auto)


 


 


 


 1.1 x10^3/uL


(1.0-4.8)


 


Monocytes # (Auto)


 


 


 


 1.0 x10^3/uL


(0.0-1.1)


 


Eosinophils # (Auto)


 


 


 


 0.2 x10^3/uL


(0.0-0.7)


 


Basophils # (Auto)


 


 


 


 0.0 x10^3/uL


(0.0-0.2)


 


Sodium Level


 


 


 


 145 mmol/L


(136-145)


 


Potassium Level


 


 


 


 4.2 mmol/L


(3.5-5.1)


 


Chloride Level


 


 


 


 110 mmol/L


()


 


Carbon Dioxide Level


 


 


 


 32 mmol/L


(21-32)


 


Anion Gap    3 (6-14) 


 


Blood Urea Nitrogen


 


 


 


 14 mg/dL


(7-20)


 


Creatinine


 


 


 


 0.5 mg/dL


(0.6-1.0)


 


Estimated GFR


(Cockcroft-Gault) 


 


 


 131.1 





 


Glucose Level


 


 


 


 150 mg/dL


(70-99)


 


Calcium Level


 


 


 


 9.8 mg/dL


(8.5-10.1)


 


Phosphorus Level


 


 


 


 3.6 mg/dL


(2.6-4.7)


 


Magnesium Level


 


 


 


 2.0 mg/dL


(1.8-2.4)


 


Test


 7/9/20


05:50 


 


 





 


Glucose (Fingerstick)


 144 mg/dL


(70-99) 


 


 











Microbiology


6/30/20 Gram Stain - Final, Complete


          


6/30/20 Aerobic and Anaerobic Culture - Final, Complete


          


6/30/20 Antimicrobic Susceptibility - Final, Complete


          


6/28/20 Blood Culture - Final, Complete


          NO GROWTH AFTER 5 DAYS


6/15/20 Gram Stain - Final, Complete


          


6/15/20 Aerobic and Anaerobic Culture - Final, Complete


          


6/13/20 Gram Stain Evaluation - Final, Complete


          


6/13/20 Respiratory Culture - Final, Complete


          


6/13/20 Antimicrobic Susceptibility - Final, Complete


          


6/7/20 Urine Culture - Final, Complete


         


5/30/20 Gram Stain - Final, Complete


          


5/30/20 Aerobic Culture - Final, Complete


Medications





Current Medications


Sodium Chloride 1,000 ml @  1,000 mls/hr Q1H IV  Last administered on 3/16/20at 

03:00;  Start 3/16/20 at 03:00;  Stop 3/16/20 at 03:59;  Status DC


Ondansetron HCl (Zofran) 4 mg 1X  ONCE IVP  Last administered on 3/16/20at 

03:27;  Start 3/16/20 at 03:00;  Stop 3/16/20 at 03:01;  Status DC


Morphine Sulfate (Morphine Sulfate) 4 mg 1X  ONCE IV ;  Start 3/16/20 at 03:00; 

Stop 3/16/20 at 03:01;  Status Cancel


Ketorolac Tromethamine (Toradol 30mg Vial) 30 mg 1X  ONCE IV  Last administered 

on 3/16/20at 02:54;  Start 3/16/20 at 03:00;  Stop 3/16/20 at 03:01;  Status DC


Fentanyl Citrate (Fentanyl 2ml Vial) 25 mcg 1X  ONCE IVP  Last administered on 

3/16/20at 03:23;  Start 3/16/20 at 03:30;  Stop 3/16/20 at 03:31;  Status DC


Fentanyl Citrate (Fentanyl 2ml Vial) 100 mcg STK-MED ONCE .ROUTE ;  Start 

3/16/20 at 03:18;  Stop 3/16/20 at 03:18;  Status DC


Iohexol (Omnipaque 350 Mg/ml) 90 ml 1X  ONCE IV  Last administered on 3/16/20at 

03:25;  Start 3/16/20 at 03:30;  Stop 3/16/20 at 03:31;  Status DC


Info (CONTRAST GIVEN -- Rx MONITORING) 1 each PRN DAILY  PRN MC SEE COMMENTS;  

Start 3/16/20 at 03:30;  Stop 3/18/20 at 03:29;  Status DC


Hydromorphone HCl (Dilaudid) 0.5 mg 1X  ONCE IV  Last administered on 3/16/20at 

03:55;  Start 3/16/20 at 04:30;  Stop 3/16/20 at 04:32;  Status DC


Ondansetron HCl (Zofran) 4 mg PRN Q8HRS  PRN IV NAUSEA/VOMITING 1ST CHOICE;  

Start 3/16/20 at 05:00;  Stop 3/16/20 at 09:27;  Status DC


Morphine Sulfate (Morphine Sulfate) 2 mg PRN Q2HR  PRN IV SEVERE PAIN 7-10 Last 

administered on 3/17/20at 12:26;  Start 3/16/20 at 05:00;  Stop 3/17/20 at 

14:15;  Status DC


Sodium Chloride 1,000 ml @  125 mls/hr Q8H IV  Last administered on 3/16/20at 

20:56;  Start 3/16/20 at 05:00;  Stop 3/17/20 at 04:59;  Status DC


Hydromorphone HCl (Dilaudid) 0.5 mg PRN Q3HRS  PRN IV SEVERE PAIN 7-10 Last 

administered on 3/17/20at 10:06;  Start 3/16/20 at 05:00;  Stop 3/17/20 at 

12:01;  Status DC


Piperacillin Sod/ Tazobactam Sod 4.5 gm/Sodium Chloride 100 ml @  200 mls/hr 1X 

ONCE IV  Last administered on 3/16/20at 05:44;  Start 3/16/20 at 06:00;  Stop 

3/16/20 at 06:29;  Status DC


Ondansetron HCl (Zofran) 4 mg PRN Q4HRS  PRN IV NAUSEA/VOMITING 1ST CHOICE Last 

administered on 7/8/20at 08:07;  Start 3/16/20 at 09:30


Insulin Human Lispro (HumaLOG) 0-9 UNITS Q6HRS SQ  Last administered on 7/8/20at

11:54;  Start 3/16/20 at 09:30


Dextrose (Dextrose 50%-Water Syringe) 12.5 gm PRN Q15MIN  PRN IV SEE COMMENTS;  

Start 3/16/20 at 09:30


Pantoprazole Sodium (PROTONIX VIAL for IV PUSH) 40 mg DAILYAC IVP  Last 

administered on 7/8/20at 08:07;  Start 3/16/20 at 11:30


Prochlorperazine Edisylate (Compazine) 10 mg PRN Q6HRS  PRN IV NAUSEA/VOMITING, 

2nd CHOICE Last administered on 6/27/20at 10:53;  Start 3/16/20 at 17:45


Atenolol (Tenormin) 100 mg DAILY PO ;  Start 3/17/20 at 09:00;  Stop 3/16/20 at 

20:08;  Status DC


Metoprolol Tartrate (Lopressor Vial) 2.5 mg Q6HRS IVP  Last administered on 

3/17/20at 05:51;  Start 3/16/20 at 20:15;  Stop 3/17/20 at 10:02;  Status DC


Metoprolol Tartrate (Lopressor Vial) 5 mg Q6HRS IVP  Last administered on 

3/26/20at 00:12;  Start 3/17/20 at 10:15;  Stop 3/28/20 at 08:48;  Status DC


Hydromorphone HCl (Dilaudid) 1 mg PRN Q3HRS  PRN IV SEVERE PAIN 7-10 Last 

administered on 3/23/20at 05:13;  Start 3/17/20 at 12:00;  Stop 3/31/20 at 

00:25;  Status DC


Lidocaine HCl (Buffered Lidocaine 1%) 3 ml STK-MED ONCE .ROUTE ;  Start 3/17/20 

at 12:55;  Stop 3/17/20 at 12:56;  Status DC


Albumin Human 500 ml @  125 mls/hr 1X  ONCE IV  Last administered on 3/17/20at 

14:33;  Start 3/17/20 at 14:30;  Stop 3/17/20 at 18:32;  Status DC


Norepinephrine Bitartrate 8 mg/ Dextrose 258 ml @  17.299 mls/ hr CONT  PRN IV 

PER PROTOCOL Last administered on 4/14/20at 12:48;  Start 3/17/20 at 15:30;  

Stop 4/17/20 at 09:19;  Status DC


Sodium Chloride 1,000 ml @  125 mls/hr Q8H IV  Last administered on 3/17/20at 

21:04;  Start 3/17/20 at 16:00;  Stop 3/18/20 at 02:42;  Status DC


Albumin Human 500 ml @  125 mls/hr PRN BID  PRN IV After every 2L NSS & BP < 

90mm Last administered on 6/30/20at 16:06;  Start 3/17/20 at 16:00;  Stop 7/3/20

at 09:30;  Status DC


Iohexol (Omnipaque 300 Mg/ml) 60 ml 1X  ONCE IV  Last administered on 3/17/20at 

17:20;  Start 3/17/20 at 17:00;  Stop 3/17/20 at 17:01;  Status DC


Info (CONTRAST GIVEN -- Rx MONITORING) 1 each PRN DAILY  PRN MC SEE COMMENTS;  

Start 3/17/20 at 17:00;  Stop 3/19/20 at 16:59;  Status DC


Meropenem 1 gm/ Sodium Chloride 100 ml @  200 mls/hr Q8HRS IV  Last administered

on 3/18/20at 05:45;  Start 3/17/20 at 20:00;  Stop 3/18/20 at 08:48;  Status DC


Furosemide (Lasix) 40 mg 1X  ONCE IVP  Last administered on 3/17/20at 22:12;  

Start 3/17/20 at 22:30;  Stop 3/17/20 at 22:31;  Status DC


Calcium Chloride 1000 mg/Sodium Chloride 110 ml @  220 mls/hr 1X  ONCE IV  Last 

administered on 3/17/20at 22:11;  Start 3/17/20 at 22:30;  Stop 3/17/20 at 

22:59;  Status DC


Albuterol Sulfate (Ventolin Neb Soln) 2.5 mg 1X  ONCE NEB  Last administered on 

3/18/20at 00:56;  Start 3/17/20 at 22:30;  Stop 3/17/20 at 22:31;  Status DC


Insulin Human Regular (HumuLIN R VIAL) 5 unit 1X  ONCE IV  Last administered on 

3/17/20at 22:14;  Start 3/17/20 at 22:30;  Stop 3/17/20 at 22:31;  Status DC


Magnesium Sulfate 50 ml @ 25 mls/hr 1X  ONCE IV  Last administered on 3/18/20at 

02:57;  Start 3/18/20 at 03:00;  Stop 3/18/20 at 04:59;  Status DC


Calcium Gluconate 1000 mg/Sodium Chloride 110 ml @  220 mls/hr 1X  ONCE IV  Last

administered on 3/18/20at 02:46;  Start 3/18/20 at 03:00;  Stop 3/18/20 at 

03:29;  Status DC


Sodium Chloride 1,000 ml @  200 mls/hr Q5H IV  Last administered on 3/18/20at 

02:46;  Start 3/18/20 at 03:00;  Stop 3/18/20 at 10:21;  Status DC


Calcium Gluconate 1000 mg/Sodium Chloride 110 ml @  220 mls/hr 1X  ONCE IV  Last

administered on 3/18/20at 03:21;  Start 3/18/20 at 03:30;  Stop 3/18/20 at 

03:59;  Status DC


Sodium Bicarbonate 50 meq/Sodium Chloride 1,050 ml @  75 mls/hr Q14H IV  Last 

administered on 3/22/20at 21:10;  Start 3/18/20 at 07:30;  Stop 3/23/20 at 

10:28;  Status DC


Calcium Gluconate 2000 mg/Sodium Chloride 120 ml @  220 mls/hr 1X  ONCE IV  Last

administered on 3/18/20at 09:05;  Start 3/18/20 at 07:30;  Stop 3/18/20 at 

08:02;  Status DC


Lidocaine HCl (Xylocaine-Mpf 1% 2ml Vial) 2 ml STK-MED ONCE .ROUTE ;  Start 

3/18/20 at 08:47;  Stop 3/18/20 at 08:47;  Status DC


Meropenem 500 mg/ Sodium Chloride 50 ml @  100 mls/hr Q12HR IV  Last 

administered on 3/23/20at 21:01;  Start 3/18/20 at 18:00;  Stop 3/24/20 at 

07:58;  Status DC


Lidocaine HCl (Buffered Lidocaine 1%) 3 ml STK-MED ONCE .ROUTE ;  Start 3/18/20 

at 09:46;  Stop 3/18/20 at 09:46;  Status DC


Lidocaine HCl (Buffered Lidocaine 1%) 6 ml 1X  ONCE INJ  Last administered on 

3/18/20at 10:26;  Start 3/18/20 at 10:15;  Stop 3/18/20 at 10:16;  Status DC


Info (Tpn Per Pharmacy) 1 each PRN DAILY  PRN MC SEE COMMENTS Last administered 

on 7/8/20at 13:27;  Start 3/18/20 at 12:00


Sodium Chloride 1,000 ml @  1,000 mls/hr Q1H PRN IV hypotension;  Start 3/18/20 

at 12:07;  Stop 3/18/20 at 18:06;  Status DC


Diphenhydramine HCl (Benadryl) 25 mg 1X PRN  PRN IV ITCHING;  Start 3/18/20 at 

12:15;  Stop 3/19/20 at 12:14;  Status DC


Diphenhydramine HCl (Benadryl) 25 mg 1X PRN  PRN IV ITCHING;  Start 3/18/20 at 1

2:15;  Stop 3/19/20 at 12:14;  Status DC


Sodium Chloride 1,000 ml @  400 mls/hr Q2H30M PRN IV PATENCY;  Start 3/18/20 at 

12:07;  Stop 3/19/20 at 00:06;  Status DC


Info (PHARMACY MONITORING -- do not chart) 1 each PRN DAILY  PRN MC SEE 

COMMENTS;  Start 3/18/20 at 12:15;  Stop 3/20/20 at 08:13;  Status DC


Sodium Chloride 90 meq/Calcium Gluconate 10 meq/ Multivitamins 10 ml/Chromium/ 

Copper/Manganese/ Seleni/Zn 1 ml/ Total Parenteral Nutrition/Amino 

Acids/Dextrose/ Fat Emulsion Intravenous 55.005 ml  @ 2.292 mls/hr TPN  CONT IV 

;  Start 3/18/20 at 22:00;  Stop 3/18/20 at 12:33;  Status DC


Info (Tpn Per Pharmacy) 1 each PRN DAILY  PRN MC SEE COMMENTS;  Start 3/18/20 at

12:30;  Status UNV


Sodium Chloride 90 meq/Calcium Gluconate 10 meq/ Multivitamins 10 ml/Chromium/ 

Copper/Manganese/ Seleni/Zn 0.5 ml/ Total Parenteral Nutrition/Amino 

Acids/Dextrose/ Fat Emulsion Intravenous 1,512 ml @  63 mls/hr TPN  CONT IV  

Last administered on 3/18/20at 22:06;  Start 3/18/20 at 22:00;  Stop 3/19/20 at 

21:59;  Status DC


Calcium Carbonate/ Glycine (Tums) 500 mg PRN AFTMEALHC  PRN PO INDIGESTION;  

Start 3/18/20 at 17:45;  Stop 5/13/20 at 10:25;  Status DC


Calcium Gluconate (Calcium Gluconate) 2,000 mg 1X  ONCE IVP  Last administered 

on 3/19/20at 02:19;  Start 3/19/20 at 02:15;  Stop 3/19/20 at 02:16;  Status DC


Calcium Chloride 3000 mg/Sodium Chloride 1,030 ml @  50 mls/hr [id number] IV  Last 

administered on 3/21/20at 02:17;  Start 3/19/20 at 08:00;  Stop 3/21/20 at 

15:23;  Status DC


Lorazepam (Ativan Inj) 1 mg PRN Q4HRS  PRN IVP ANXIETY / AGITATION, 2nd choic 

Last administered on 4/17/20at 03:51;  Start 3/19/20 at 09:00;  Stop 4/17/20 at 

09:19;  Status DC


Sodium Chloride 1,000 ml @  1,000 mls/hr Q1H PRN IV hypotension;  Start 3/19/20 

at 08:56;  Stop 3/19/20 at 14:55;  Status DC


Albumin Human 200 ml @  200 mls/hr 1X PRN  PRN IV Hypotension;  Start 3/19/20 at

09:00;  Stop 3/19/20 at 14:59;  Status DC


Diphenhydramine HCl (Benadryl) 25 mg 1X PRN  PRN IV ITCHING;  Start 3/19/20 at 

09:00;  Stop 3/20/20 at 08:59;  Status DC


Diphenhydramine HCl (Benadryl) 25 mg 1X PRN  PRN IV ITCHING;  Start 3/19/20 at 

09:00;  Stop 3/20/20 at 08:59;  Status DC


Sodium Chloride 1,000 ml @  400 mls/hr Q2H30M PRN IV PATENCY;  Start 3/19/20 at 

08:56;  Stop 3/19/20 at 20:55;  Status DC


Info (PHARMACY MONITORING -- do not chart) 1 each PRN DAILY  PRN MC SEE 

COMMENTS;  Start 3/19/20 at 09:00;  Status UNV


Info (PHARMACY MONITORING -- do not chart) 1 each PRN DAILY  PRN MC SEE 

COMMENTS;  Start 3/19/20 at 09:00;  Stop 3/20/20 at 08:13;  Status DC


Digoxin (Lanoxin) 500 mcg 1X  ONCE IV  Last administered on 3/19/20at 10:04;  

Start 3/19/20 at 10:00;  Stop 3/19/20 at 10:01;  Status DC


Digoxin (Lanoxin) 125 mcg 1X  ONCE IV  Last administered on 3/19/20at 17:10;  

Start 3/19/20 at 18:00;  Stop 3/19/20 at 18:01;  Status DC


Magnesium Sulfate 100 ml @  25 mls/hr 1X  ONCE IV  Last administered on 

3/19/20at 12:48;  Start 3/19/20 at 13:00;  Stop 3/19/20 at 16:59;  Status DC


Sodium Chloride 90 meq/Magnesium Sulfate 10 meq/ Calcium Gluconate 20 meq/ 

Multivitamins 10 ml/Chromium/ Copper/Manganese/ Seleni/Zn 0.5 ml/ Total 

Parenteral Nutrition/Amino Acids/Dextrose/ Fat Emulsion Intravenous 1,512 ml @  

63 mls/hr TPN  CONT IV  Last administered on 3/19/20at 22:25;  Start 3/19/20 at 

22:00;  Stop 3/20/20 at 21:59;  Status DC


Sodium Chloride 1,000 ml @  1,000 mls/hr Q1H PRN IV hypotension;  Start 3/20/20 

at 08:05;  Stop 3/20/20 at 14:04;  Status DC


Albumin Human 200 ml @  200 mls/hr 1X  ONCE IV  Last administered on 3/20/20at 

08:57;  Start 3/20/20 at 08:15;  Stop 3/20/20 at 09:14;  Status DC


Diphenhydramine HCl (Benadryl) 25 mg 1X PRN  PRN IV ITCHING;  Start 3/20/20 at 

08:15;  Stop 3/21/20 at 08:14;  Status DC


Diphenhydramine HCl (Benadryl) 25 mg 1X PRN  PRN IV ITCHING;  Start 3/20/20 at 

08:15;  Stop 3/21/20 at 08:14;  Status DC


Sodium Chloride 1,000 ml @  400 mls/hr Q2H30M PRN IV PATENCY;  Start 3/20/20 at 

08:05;  Stop 3/20/20 at 20:04;  Status DC


Info (PHARMACY MONITORING -- do not chart) 1 each PRN DAILY  PRN MC SEE 

COMMENTS;  Start 3/20/20 at 08:15;  Stop 3/24/20 at 07:57;  Status DC


Sodium Chloride 90 meq/Potassium Chloride 15 meq/ Potassium Phosphate 10 mmol/ 

Magnesium Sulfate 10 meq/Calcium Gluconate 20 meq/ Multivitamins 10 ml/Chromium/

Copper/Manganese/ Seleni/Zn 0.5 ml/ Total Parenteral Nutrition/Amino 

Acids/Dextrose/ Fat Emulsion Intravenous 1,512 ml @  63 mls/hr TPN  CONT IV  

Last administered on 3/20/20at 21:01;  Start 3/20/20 at 22:00;  Stop 3/21/20 at 

21:59;  Status DC


Potassium Chloride/Water 100 ml @  100 mls/hr 1X  ONCE IV  Last administered on 

3/20/20at 14:09;  Start 3/20/20 at 14:00;  Stop 3/20/20 at 14:59;  Status DC


Benzocaine (Hurricaine One) 1 spray 1X  ONCE MM  Last administered on 3/20/20at 

16:38;  Start 3/20/20 at 14:30;  Stop 3/20/20 at 14:31;  Status DC


Lidocaine HCl (Glydo (Lidocaine) Jelly) 1 thomas 1X  ONCE MM  Last administered on 

3/20/20at 16:38;  Start 3/20/20 at 14:30;  Stop 3/20/20 at 14:31;  Status DC


Linezolid/Dextrose 300 ml @  300 mls/hr Q12HR IV  Last administered on 3/26/20at

21:04;  Start 3/20/20 at 20:00;  Stop 3/27/20 at 07:50;  Status DC


Acetaminophen (Tylenol) 650 mg PRN Q6HRS  PRN PO MILD PAIN / TEMP;  Start 

3/21/20 at 03:30;  Stop 3/21/20 at 03:36;  Status DC


Acetaminophen (Tylenol) 650 mg PRN Q6HRS  PRN PEG MILD PAIN / TEMP Last 

administered on 4/16/20at 19:56;  Start 3/21/20 at 03:36;  Stop 5/13/20 at 

10:25;  Status DC


Sodium Chloride 1,000 ml @  1,000 mls/hr Q1H PRN IV hypotension;  Start 3/21/20 

at 07:50;  Stop 3/21/20 at 13:49;  Status DC


Albumin Human 200 ml @  200 mls/hr 1X PRN  PRN IV Hypotension;  Start 3/21/20 at

08:00;  Stop 3/21/20 at 13:59;  Status DC


Sodium Chloride (Normal Saline Flush) 10 ml 1X PRN  PRN IV AP catheter pack;  

Start 3/21/20 at 08:00;  Stop 3/22/20 at 07:59;  Status DC


Sodium Chloride (Normal Saline Flush) 10 ml 1X PRN  PRN IV  catheter pack;  

Start 3/21/20 at 08:00;  Stop 3/22/20 at 07:59;  Status DC


Sodium Chloride 1,000 ml @  400 mls/hr Q2H30M PRN IV PATENCY;  Start 3/21/20 at 

07:50;  Stop 3/21/20 at 19:49;  Status DC


Info (PHARMACY MONITORING -- do not chart) 1 each PRN DAILY  PRN MC SEE 

COMMENTS;  Start 3/21/20 at 08:00;  Status UNV


Info (PHARMACY MONITORING -- do not chart) 1 each PRN DAILY  PRN MC SEE 

COMMENTS;  Start 3/21/20 at 08:00;  Stop 3/23/20 at 08:25;  Status DC


Sodium Chloride 90 meq/Potassium Chloride 15 meq/ Potassium Phosphate 10 mmol/ 

Magnesium Sulfate 10 meq/Calcium Gluconate 20 meq/ Multivitamins 10 ml/Chromium/

Copper/Manganese/ Seleni/Zn 0.5 ml/ Total Parenteral Nutrition/Amino Acids/D

extrose/ Fat Emulsion Intravenous 1,512 ml @  63 mls/hr TPN  CONT IV  Last 

administered on 3/21/20at 20:57;  Start 3/21/20 at 22:00;  Stop 3/22/20 at 

21:59;  Status DC


Sodium Chloride 90 meq/Potassium Chloride 15 meq/ Potassium Phosphate 15 mmol/ 

Magnesium Sulfate 10 meq/Calcium Gluconate 20 meq/ Multivitamins 10 ml/Chromium/

Copper/Manganese/ Seleni/Zn 0.5 ml/ Total Parenteral Nutrition/Amino 

Acids/Dextrose/ Fat Emulsion Intravenous 1,512 ml @  63 mls/hr TPN  CONT IV ;  

Start 3/22/20 at 22:00;  Stop 3/22/20 at 14:16;  Status DC


Sodium Chloride 90 meq/Potassium Chloride 15 meq/ Potassium Phosphate 15 mmol/ 

Magnesium Sulfate 10 meq/Calcium Gluconate 20 meq/ Multivitamins 10 ml/Chromium/

Copper/Manganese/ Seleni/Zn 0.5 ml/ Total Parenteral Nutrition/Amino Acid

s/Dextrose/ Fat Emulsion Intravenous 1,200 ml @  50 mls/hr TPN  CONT IV ;  Start

3/22/20 at 22:00;  Stop 3/22/20 at 14:17;  Status DC


Sodium Chloride 90 meq/Potassium Chloride 15 meq/ Potassium Phosphate 10 mmol/ 

Magnesium Sulfate 10 meq/Calcium Gluconate 20 meq/ Multivitamins 10 ml/Chromium/

Copper/Manganese/ Seleni/Zn 0.5 ml/ Total Parenteral Nutrition/Amino 

Acids/Dextrose/ Fat Emulsion Intravenous 1,200 ml @  50 mls/hr TPN  CONT IV  

Last administered on 3/22/20at 23:29;  Start 3/22/20 at 22:00;  Stop 3/23/20 at 

21:59;  Status DC


Sodium Chloride 1,000 ml @  1,000 mls/hr Q1H PRN IV hypotension;  Start 3/23/20 

at 07:28;  Stop 3/23/20 at 13:27;  Status DC


Albumin Human 200 ml @  200 mls/hr 1X  ONCE IV  Last administered on 3/23/20at 

08:51;  Start 3/23/20 at 07:30;  Stop 3/23/20 at 08:29;  Status DC


Diphenhydramine HCl (Benadryl) 25 mg 1X PRN  PRN IV ITCHING;  Start 3/23/20 at 

07:30;  Stop 3/24/20 at 07:29;  Status DC


Diphenhydramine HCl (Benadryl) 25 mg 1X PRN  PRN IV ITCHING;  Start 3/23/20 at 

07:30;  Stop 3/24/20 at 07:29;  Status DC


Sodium Chloride 1,000 ml @  400 mls/hr Q2H30M PRN IV PATENCY;  Start 3/23/20 at 

07:28;  Stop 3/23/20 at 19:27;  Status DC


Info (PHARMACY MONITORING -- do not chart) 1 each PRN DAILY  PRN MC SEE 

COMMENTS;  Start 3/23/20 at 07:30;  Stop 4/3/20 at 13:01;  Status DC


Metronidazole 100 ml @  100 mls/hr Q6HRS IV  Last administered on 4/8/20at 

06:26;  Start 3/23/20 at 08:30;  Stop 4/8/20 at 09:58;  Status DC


Micafungin Sodium 100 mg/Dextrose 100 ml @  100 mls/hr Q24H IV  Last 

administered on 4/30/20at 08:18;  Start 3/23/20 at 09:00;  Stop 4/30/20 at 

20:58;  Status DC


Propofol 0 ml @ As Directed STK-MED ONCE IV ;  Start 3/23/20 at 07:53;  Stop 

3/23/20 at 07:53;  Status DC


Etomidate (Amidate) 20 mg STK-MED ONCE IV ;  Start 3/23/20 at 07:53;  Stop 

3/23/20 at 07:54;  Status DC


Midazolam HCl (Versed) 5 mg STK-MED ONCE .ROUTE ;  Start 3/23/20 at 07:57;  Stop

3/23/20 at 07:57;  Status DC


Fentanyl Citrate 30 ml @ 0 mls/hr CONT  PRN IV SEE PROTOCOL Last administered on

4/17/20at 06:12;  Start 3/23/20 at 08:15;  Stop 4/17/20 at 09:19;  Status DC


Artificial Tears (Artificial Tears) 1 drop PRN Q1HR  PRN OU DRY EYE, 1st choice;

 Start 3/23/20 at 08:15;  Stop 4/29/20 at 05:31;  Status DC


Midazolam HCl 50 mg/Sodium Chloride 50 ml @ 0 mls/hr CONT  PRN IV SEE PROTOCOL 

Last administered on 3/26/20at 22:39;  Start 3/23/20 at 08:15;  Stop 3/28/20 at 

15:59;  Status DC


Etomidate (Amidate) 8 mg 1X  ONCE IV  Last administered on 3/23/20at 08:33;  

Start 3/23/20 at 08:30;  Stop 3/23/20 at 08:31;  Status DC


Succinylcholine Chloride (Anectine) 120 mg 1X  ONCE IV  Last administered on 

3/23/20at 08:34;  Start 3/23/20 at 08:30;  Stop 3/23/20 at 08:31;  Status DC


Midazolam HCl (Versed) 5 mg 1X  ONCE IV ;  Start 3/23/20 at 08:30;  Stop 3/23/20

at 08:31;  Status DC


Potassium Chloride 15 meq/ Bicarbonate Dialysis Soln w/ out KCl 5,007.5 ml  @ 

1,000 mls/ hr Q5H1M IV  Last administered on 3/24/20at 11:11;  Start 3/23/20 at 

12:00;  Stop 3/24/20 at 11:15;  Status DC


Potassium Chloride 15 meq/ Bicarbonate Dialysis Soln w/ out KCl 5,007.5 ml  @ 

1,000 mls/ hr Q5H1M IV  Last administered on 3/24/20at 11:12;  Start 3/23/20 at 

12:00;  Stop 3/24/20 at 11:17;  Status DC


Potassium Chloride 15 meq/ Bicarbonate Dialysis Soln w/ out KCl 5,007.5 ml  @ 

1,000 mls/ hr Q5H1M IV  Last administered on 3/24/20at 11:11;  Start 3/23/20 at 

12:00;  Stop 3/24/20 at 11:19;  Status DC


Sodium Chloride 90 meq/Potassium Chloride 15 meq/ Potassium Phosphate 10 mmol/ 

Magnesium Sulfate 10 meq/Calcium Gluconate 20 meq/ Multivitamins 10 ml/Chromium/

Copper/Manganese/ Seleni/Zn 0.5 ml/ Total Parenteral Nutrition/Amino 

Acids/Dextrose/ Fat Emulsion Intravenous 1,400 ml @  58.333 mls/ hr TPN  CONT IV

 Last administered on 3/23/20at 21:42;  Start 3/23/20 at 22:00;  Stop 3/24/20 at

21:59;  Status DC


Heparin Sodium (Porcine) (Heparin Sodium) 5,000 unit Q8HRS SQ  Last administered

on 3/28/20at 05:55;  Start 3/23/20 at 15:00;  Stop 3/28/20 at 13:28;  Status DC


Meropenem 500 mg/ Sodium Chloride 50 ml @  100 mls/hr Q6HRS IV  Last 

administered on 3/25/20at 06:00;  Start 3/24/20 at 09:00;  Stop 3/25/20 at 

07:29;  Status DC


Potassium Phosphate 20 mmol/ Sodium Chloride 106.6667 ml @  51.667 m... 1X  ONCE

IV  Last administered on 3/24/20at 11:22;  Start 3/24/20 at 10:15;  Stop 3/24/20

at 12:18;  Status DC


Acetaminophen (Tylenol Supp) 650 mg PRN Q6HRS  PRN UT MILD PAIN / TEMP > 100.3'F

Last administered on 6/29/20at 18:16;  Start 3/24/20 at 10:30


Potassium Chloride/Water 100 ml @  100 mls/hr Q1H IV  Last administered on 

3/24/20at 12:12;  Start 3/24/20 at 11:00;  Stop 3/24/20 at 12:59;  Status DC


Potassium Chloride 20 meq/ Bicarbonate Dialysis Soln w/ out KCl 5,010 ml @  

1,000 mls/hr Q5H1M IV  Last administered on 3/25/20at 08:48;  Start 3/24/20 at 

12:00;  Stop 3/25/20 at 13:03;  Status DC


Potassium Chloride 20 meq/ Bicarbonate Dialysis Soln w/ out KCl 5,010 ml @  

1,000 mls/hr Q5H1M IV  Last administered on 3/29/20at 14:52;  Start 3/24/20 at 

11:30;  Stop 3/29/20 at 19:59;  Status DC


Potassium Chloride 20 meq/ Bicarbonate Dialysis Soln w/ out KCl 5,010 ml @  

1,000 mls/hr Q5H1M IV  Last administered on 3/29/20at 14:53;  Start 3/24/20 at 

11:30;  Stop 3/29/20 at 19:59;  Status DC


Sodium Chloride 90 meq/Potassium Chloride 15 meq/ Potassium Phosphate 15 mmol/ 

Magnesium Sulfate 10 meq/Calcium Gluconate 15 meq/ Multivitamins 10 ml/Chromium/

Copper/Manganese/ Seleni/Zn 0.5 ml/ Total Parenteral Nutrition/Amino 

Acids/Dextrose/ Fat Emulsion Intravenous 1,400 ml @  58.333 mls/ hr TPN  CONT IV

 Last administered on 3/24/20at 22:17;  Start 3/24/20 at 22:00;  Stop 3/25/20 at

21:59;  Status DC


Cefepime HCl (Maxipime) 2 gm Q12HR IVP  Last administered on 4/7/20at 20:56;  

Start 3/25/20 at 09:00;  Stop 4/8/20 at 09:58;  Status DC


Daptomycin 500 mg/ Sodium Chloride 50 ml @  100 mls/hr Q48H IV  Last 

administered on 4/10/20at 09:57;  Start 3/25/20 at 08:30;  Stop 4/10/20 at 

10:07;  Status DC


Lidocaine HCl (Buffered Lidocaine 1%) 3 ml 1X  ONCE INJ  Last administered on 

3/25/20at 10:27;  Start 3/25/20 at 10:30;  Stop 3/25/20 at 10:31;  Status DC


Potassium Phosphate 20 mmol/ Sodium Chloride 106.6667 ml @  51.667 m... 1X  ONCE

IV  Last administered on 3/25/20at 12:51;  Start 3/25/20 at 13:00;  Stop 3/25/20

at 15:03;  Status DC


Sodium Chloride 90 meq/Potassium Chloride 15 meq/ Potassium Phosphate 18 mmol/ 

Magnesium Sulfate 8 meq/Calcium Gluconate 15 meq/ Multivitamins 10 ml/Chromium/ 

Copper/Manganese/ Seleni/Zn 0.5 ml/ Total Parenteral Nutrition/Amino 

Acids/Dextrose/ Fat Emulsion Intravenous 1,400 ml @  58.333 mls/ hr TPN  CONT IV

 Last administered on 3/25/20at 22:16;  Start 3/25/20 at 22:00;  Stop 3/26/20 at

21:59;  Status DC


Potassium Chloride 20 meq/ Bicarbonate Dialysis Soln w/ out KCl 5,010 ml @  

1,000 mls/hr Q5H1M IV  Last administered on 3/29/20at 14:54;  Start 3/25/20 at 

16:00;  Stop 3/29/20 at 19:59;  Status DC


Multi-Ingred Cream/Lotion/Oil/ Oint (Artificial Tears Eye Ointment) 1 thomas PRN 

Q1HR  PRN OU DRY EYE, 2nd choice Last administered on 4/13/20at 08:19;  Start 

3/25/20 at 17:30;  Stop 6/3/20 at 14:39;  Status DC


Sodium Chloride 90 meq/Potassium Chloride 15 meq/ Potassium Phosphate 18 mmol/ 

Magnesium Sulfate 8 meq/Calcium Gluconate 15 meq/ Multivitamins 10 ml/Chromium/ 

Copper/Manganese/ Seleni/Zn 0.5 ml/ Total Parenteral Nutrition/Amino Acid

s/Dextrose/ Fat Emulsion Intravenous 1,400 ml @  58.333 mls/ hr TPN  CONT IV  

Last administered on 3/26/20at 22:00;  Start 3/26/20 at 22:00;  Stop 3/27/20 at 

21:59;  Status DC


Albumin Human 500 ml @  125 mls/hr 1X  ONCE IV ;  Start 3/26/20 at 14:15;  Stop 

3/26/20 at 18:14;  Status DC


Sodium Chloride 90 meq/Potassium Chloride 15 meq/ Potassium Phosphate 18 mmol/ 

Magnesium Sulfate 8 meq/Calcium Gluconate 15 meq/ Multivitamins 10 ml/Chromium/ 

Copper/Manganese/ Seleni/Zn 0.5 ml/ Insulin Human Regular 10 unit/ Total 

Parenteral Nutrition/Amino Acids/Dextrose/ Fat Emulsion Intravenous 1,400 ml @  

58.333 mls/ hr TPN  CONT IV  Last administered on 3/27/20at 21:43;  Start 

3/27/20 at 22:00;  Stop 3/28/20 at 21:59;  Status DC


Lidocaine HCl (Buffered Lidocaine 1%) 3 ml STK-MED ONCE .ROUTE ;  Start 3/25/20 

at 10:00;  Stop 3/27/20 at 13:57;  Status DC


Midazolam HCl 100 mg/Sodium Chloride 100 ml @ 7 mls/hr CONT  PRN IV SEE PROTOCOL

Last administered on 4/8/20at 15:35;  Start 3/28/20 at 16:00;  Stop 6/3/20 at 

14:38;  Status DC


Sodium Chloride 90 meq/Potassium Chloride 15 meq/ Potassium Phosphate 18 mmol/ 

Magnesium Sulfate 8 meq/Calcium Gluconate 15 meq/ Multivitamins 10 ml/Chromium/ 

Copper/Manganese/ Seleni/Zn 0.5 ml/ Insulin Human Regular 15 unit/ Total 

Parenteral Nutrition/Amino Acids/Dextrose/ Fat Emulsion Intravenous 1,400 ml @  

58.333 mls/ hr TPN  CONT IV  Last administered on 3/28/20at 20:34;  Start 

3/28/20 at 22:00;  Stop 3/29/20 at 21:59;  Status DC


Info ([facility] Electrolyte Protocol) 1 ea CONT PRN  PRN MC PER PROTOCOL;  Start 

3/29/20 at 13:15


Sodium Chloride 90 meq/Potassium Chloride 15 meq/ Potassium Phosphate 18 mmol/ 

Magnesium Sulfate 8 meq/Calcium Gluconate 15 meq/ Multivitamins 10 ml/Chromium/ 

Copper/Manganese/ Seleni/Zn 0.5 ml/ Insulin Human Regular 15 unit/ Total Par

enteral Nutrition/Amino Acids/Dextrose/ Fat Emulsion Intravenous 1,400 ml @  

58.333 mls/ hr TPN  CONT IV  Last administered on 3/29/20at 22:05;  Start 

3/29/20 at 22:00;  Stop 3/30/20 at 21:59;  Status DC


Potassium Chloride 15 meq/ Bicarbonate Dialysis Soln w/ out KCl 5,007.5 ml  @ 

1,000 mls/ hr Q5H1M IV  Last administered on 4/1/20at 18:14;  Start 3/29/20 at 

20:00;  Stop 4/2/20 at 13:08;  Status DC


Potassium Chloride 15 meq/ Bicarbonate Dialysis Soln w/ out KCl 5,007.5 ml  @ 

1,000 mls/ hr Q5H1M IV  Last administered on 4/1/20at 18:14;  Start 3/29/20 at 

20:00;  Stop 4/2/20 at 13:08;  Status DC


Potassium Chloride 15 meq/ Bicarbonate Dialysis Soln w/ out KCl 5,007.5 ml  @ 

1,000 mls/ hr Q5H1M IV  Last administered on 4/1/20at 18:14;  Start 3/29/20 at 

20:00;  Stop 4/2/20 at 13:08;  Status DC


Iohexol (Omnipaque 240 Mg/ml) 30 ml 1X  ONCE PO  Last administered on 3/30/20at 

11:30;  Start 3/30/20 at 11:30;  Stop 3/30/20 at 11:33;  Status DC


Info (CONTRAST GIVEN -- Rx MONITORING) 1 each PRN DAILY  PRN MC SEE COMMENTS;  

Start 3/30/20 at 11:45;  Stop 4/1/20 at 11:44;  Status DC


Sodium Chloride 90 meq/Potassium Chloride 15 meq/ Potassium Phosphate 18 mmol/ 

Magnesium Sulfate 8 meq/Calcium Gluconate 15 meq/ Multivitamins 10 ml/Chromium/ 

Copper/Manganese/ Seleni/Zn 0.5 ml/ Insulin Human Regular 15 unit/ Total 

Parenteral Nutrition/Amino Acids/Dextrose/ Fat Emulsion Intravenous 1,400 ml @  

58.333 mls/ hr TPN  CONT IV  Last administered on 3/30/20at 21:47;  Start 

3/30/20 at 22:00;  Stop 3/31/20 at 21:59;  Status DC


Sodium Chloride 90 meq/Potassium Chloride 15 meq/ Potassium Phosphate 18 mmol/ 

Magnesium Sulfate 8 meq/Calcium Gluconate 15 meq/ Multivitamins 10 ml/Chromium/ 

Copper/Manganese/ Seleni/Zn 0.5 ml/ Insulin Human Regular 20 unit/ Total 

Parenteral Nutrition/Amino Acids/Dextrose/ Fat Emulsion Intravenous 1,400 ml @  

58.333 mls/ hr TPN  CONT IV  Last administered on 3/31/20at 21:36;  Start 

3/31/20 at 22:00;  Stop 4/1/20 at 21:59;  Status DC


Alteplase, Recombinant (Cathflo For Central Catheter Clearance) 1 mg 1X  ONCE 

INT CAT  Last administered on 3/31/20at 20:03;  Start 3/31/20 at 19:30;  Stop 

3/31/20 at 19:46;  Status DC


Alteplase, Recombinant (Cathflo For Central Catheter Clearance) 1 mg 1X  ONCE 

INT CAT  Last administered on 3/31/20at 22:05;  Start 3/31/20 at 22:00;  Stop 

3/31/20 at 22:01;  Status DC


Sodium Chloride 90 meq/Potassium Chloride 15 meq/ Potassium Phosphate 18 mmol/ 

Magnesium Sulfate 8 meq/Calcium Gluconate 15 meq/ Multivitamins 10 ml/Chromium/ 

Copper/Manganese/ Seleni/Zn 0.5 ml/ Insulin Human Regular 20 unit/ Total 

Parenteral Nutrition/Amino Acids/Dextrose/ Fat Emulsion Intravenous 1,400 ml @  

58.333 mls/ hr TPN  CONT IV  Last administered on 4/1/20at 21:30;  Start 4/1/20 

at 22:00;  Stop 4/2/20 at 21:59;  Status DC


Dexmedetomidine HCl 400 mcg/ Sodium Chloride 100 ml @ 0 mls/hr CONT  PRN IV 

ANXIETY / AGITATION Last administered on 5/30/20at 12:57;  Start 4/2/20 at 

08:15;  Stop 5/30/20 at 18:31;  Status DC


Sodium Chloride 500 ml @  500 mls/hr 1X PRN  PRN IV ELEVATED BP, SEE COMMENTS;  

Start 4/2/20 at 08:15


Atropine Sulfate (ATROPINE 0.5mg SYRINGE) 0.5 mg PRN Q5MIN  PRN IV SEE COMMENTS;

 Start 4/2/20 at 08:15


Furosemide (Lasix) 20 mg 1X  ONCE IVP  Last administered on 4/2/20at 08:19;  

Start 4/2/20 at 08:15;  Stop 4/2/20 at 08:16;  Status DC


Lidocaine HCl (Buffered Lidocaine 1%) 3 ml STK-MED ONCE .ROUTE ;  Start 4/2/20 

at 08:39;  Stop 4/2/20 at 08:39;  Status DC


Lidocaine HCl (Buffered Lidocaine 1%) 6 ml 1X  ONCE INJ  Last administered on 

4/2/20at 09:05;  Start 4/2/20 at 09:00;  Stop 4/2/20 at 09:06;  Status DC


Sodium Chloride 90 meq/Potassium Chloride 15 meq/ Potassium Phosphate 18 mmol/ 

Magnesium Sulfate 8 meq/Calcium Gluconate 15 meq/ Multivitamins 10 ml/Chromium/ 

Copper/Manganese/ Seleni/Zn 0.5 ml/ Insulin Human Regular 20 unit/ Total 

Parenteral Nutrition/Amino Acids/Dextrose/ Fat Emulsion Intravenous 1,400 ml @  

58.333 mls/ hr TPN  CONT IV  Last administered on 4/2/20at 22:45;  Start 4/2/20 

at 22:00;  Stop 4/3/20 at 21:59;  Status DC


Sodium Chloride 1,000 ml @  1,000 mls/hr Q1H PRN IV hypotension;  Start 4/3/20 

at 07:30;  Stop 4/3/20 at 13:29;  Status DC


Albumin Human 200 ml @  200 mls/hr 1X PRN  PRN IV Hypotension Last administered 

on 4/3/20at 09:36;  Start 4/3/20 at 07:30;  Stop 4/3/20 at 13:29;  Status DC


Sodium Chloride (Normal Saline Flush) 10 ml 1X PRN  PRN IV AP catheter pack;  

Start 4/3/20 at 07:30;  Stop 4/3/20 at 21:29;  Status DC


Sodium Chloride (Normal Saline Flush) 10 ml 1X PRN  PRN IV  catheter pack;  

Start 4/3/20 at 07:30;  Stop 4/4/20 at 07:29;  Status DC


Sodium Chloride 1,000 ml @  400 mls/hr Q2H30M PRN IV PATENCY;  Start 4/3/20 at 

07:30;  Stop 4/3/20 at 19:29;  Status DC


Info (PHARMACY MONITORING -- do not chart) 1 each PRN DAILY  PRN MC SEE 

COMMENTS;  Start 4/3/20 at 07:30;  Stop 4/3/20 at 13:02;  Status DC


Info (PHARMACY MONITORING -- do not chart) 1 each PRN DAILY  PRN MC SEE 

COMMENTS;  Start 4/3/20 at 07:30;  Stop 4/5/20 at 12:45;  Status DC


Sodium Chloride 90 meq/Potassium Chloride 15 meq/ Potassium Phosphate 10 mmol/ 

Magnesium Sulfate 8 meq/Calcium Gluconate 15 meq/ Multivitamins 10 ml/Chromium/ 

Copper/Manganese/ Seleni/Zn 0.5 ml/ Insulin Human Regular 25 unit/ Total 

Parenteral Nutrition/Amino Acids/Dextrose/ Fat Emulsion Intravenous 1,400 ml @  

58.333 mls/ hr TPN  CONT IV  Last administered on 4/3/20at 22:19;  Start 4/3/20 

at 22:00;  Stop 4/4/20 at 21:59;  Status DC


Heparin Sodium (Porcine) (Heparin Sodium) 5,000 unit Q12HR SQ  Last administered

on 4/26/20at 08:59;  Start 4/3/20 at 21:00;  Stop 4/26/20 at 10:05;  Status DC


Ondansetron HCl (Zofran) 4 mg PRN Q6HRS  PRN IV NAUSEA/VOMITING;  Start 4/6/20 

at 07:00;  Stop 4/7/20 at 06:59;  Status DC


Fentanyl Citrate (Fentanyl 2ml Vial) 25 mcg PRN Q5MIN  PRN IV MILD PAIN 1-3;  

Start 4/6/20 at 07:00;  Stop 4/7/20 at 06:59;  Status DC


Fentanyl Citrate (Fentanyl 2ml Vial) 50 mcg PRN Q5MIN  PRN IV MODERATE TO SEVERE

PAIN;  Start 4/6/20 at 07:00;  Stop 4/7/20 at 06:59;  Status DC


Ringer's Solution 1,000 ml @  30 mls/hr Q24H IV ;  Start 4/6/20 at 07:00;  Stop 

4/6/20 at 18:59;  Status DC


Lidocaine HCl (Xylocaine-Mpf 1% 2ml Vial) 2 ml PRN 1X  PRN ID PRIOR TO IV START;

 Start 4/6/20 at 07:00;  Stop 4/7/20 at 06:59;  Status DC


Prochlorperazine Edisylate (Compazine) 5 mg PACU PRN  PRN IV NAUSEA, MRX1;  

Start 4/6/20 at 07:00;  Stop 4/7/20 at 06:59;  Status DC


Sodium Chloride 1,000 ml @  1,000 mls/hr Q1H PRN IV hypotension;  Start 4/4/20 

at 09:10;  Stop 4/4/20 at 15:09;  Status DC


Albumin Human 200 ml @  200 mls/hr 1X PRN  PRN IV Hypotension Last administered 

on 4/4/20at 10:10;  Start 4/4/20 at 09:15;  Stop 4/4/20 at 15:14;  Status DC


Sodium Chloride 1,000 ml @  400 mls/hr Q2H30M PRN IV PATENCY;  Start 4/4/20 at 

09:10;  Stop 4/4/20 at 21:09;  Status DC


Info (PHARMACY MONITORING -- do not chart) 1 each PRN DAILY  PRN MC SEE 

COMMENTS;  Start 4/4/20 at 09:15;  Stop 4/5/20 at 12:45;  Status DC


Info (PHARMACY MONITORING -- do not chart) 1 each PRN DAILY  PRN MC SEE 

COMMENTS;  Start 4/4/20 at 09:15;  Stop 4/5/20 at 12:45;  Status DC


Sodium Chloride 90 meq/Potassium Chloride 15 meq/ Potassium Phosphate 10 mmol/ 

Magnesium Sulfate 8 meq/Calcium Gluconate 15 meq/ Multivitamins 10 ml/Chromium/ 

Copper/Manganese/ Seleni/Zn 0.5 ml/ Insulin Human Regular 25 unit/ Total 

Parenteral Nutrition/Amino Acids/Dextrose/ Fat Emulsion Intravenous 1,400 ml @  

58.333 mls/ hr TPN  CONT IV  Last administered on 4/4/20at 22:10;  Start 4/4/20 

at 22:00;  Stop 4/5/20 at 21:59;  Status DC


Magnesium Sulfate 50 ml @ 25 mls/hr PRN DAILY  PRN IV for Mag < 1.7 on am labs 

Last administered on 6/18/20at 10:57;  Start 4/5/20 at 09:15


Sodium Chloride 90 meq/Potassium Chloride 15 meq/ Potassium Phosphate 10 mmol/ 

Magnesium Sulfate 8 meq/Calcium Gluconate 15 meq/ Multivitamins 10 ml/Chromium/ 

Copper/Manganese/ Seleni/Zn 0.5 ml/ Insulin Human Regular 25 unit/ Total 

Parenteral Nutrition/Amino Acids/Dextrose/ Fat Emulsion Intravenous 1,400 ml @  

58.333 mls/ hr TPN  CONT IV  Last administered on 4/5/20at 21:20;  Start 4/5/20 

at 22:00;  Stop 4/6/20 at 21:59;  Status DC


Sodium Chloride 1,000 ml @  1,000 mls/hr Q1H PRN IV hypotension;  Start 4/5/20 

at 12:23;  Stop 4/5/20 at 18:22;  Status DC


Albumin Human 200 ml @  200 mls/hr 1X  ONCE IV  Last administered on 4/5/20at 

13:34;  Start 4/5/20 at 12:30;  Stop 4/5/20 at 13:29;  Status DC


Diphenhydramine HCl (Benadryl) 25 mg 1X PRN  PRN IV ITCHING;  Start 4/5/20 at 

12:30;  Stop 4/6/20 at 12:29;  Status DC


Diphenhydramine HCl (Benadryl) 25 mg 1X PRN  PRN IV ITCHING;  Start 4/5/20 at 

12:30;  Stop 4/6/20 at 12:29;  Status DC


Info (PHARMACY MONITORING -- do not chart) 1 each PRN DAILY  PRN MC SEE 

COMMENTS;  Start 4/5/20 at 12:30;  Status Cancel


Bupivacaine HCl/ Epinephrine Bitart (Sensorcain-Epi 0.5%-1:428515 Mpf) 30 ml 

STK-MED ONCE .ROUTE  Last administered on 4/6/20at 11:44;  Start 4/6/20 at 

11:00;  Stop 4/6/20 at 11:01;  Status DC


Cellulose (Surgicel Fibrillar 1x2) 1 each STK-MED ONCE .ROUTE ;  Start 4/6/20 at

11:00;  Stop 4/6/20 at 11:01;  Status DC


Sodium Chloride 90 meq/Potassium Chloride 15 meq/ Potassium Phosphate 10 mmol/ 

Magnesium Sulfate 12 meq/Calcium Gluconate 15 meq/ Multivitamins 10 ml/Chromium/

Copper/Manganese/ Seleni/Zn 0.5 ml/ Insulin Human Regular 25 unit/ Total 

Parenteral Nutrition/Amino Acids/Dextrose/ Fat Emulsion Intravenous 1,400 ml @  

58.333 mls/ hr TPN  CONT IV  Last administered on 4/6/20at 22:24;  Start 4/6/20 

at 22:00;  Stop 4/7/20 at 21:59;  Status DC


Propofol 20 ml @ As Directed STK-MED ONCE IV ;  Start 4/6/20 at 11:07;  Stop 

4/6/20 at 11:07;  Status DC


Cellulose (Surgicel Hemostat 4x8) 1 each STK-MED ONCE .ROUTE  Last administered 

on 4/6/20at 11:44;  Start 4/6/20 at 11:55;  Stop 4/6/20 at 11:56;  Status DC


Sevoflurane (Ultane) 60 ml STK-MED ONCE IH ;  Start 4/6/20 at 12:46;  Stop 

4/6/20 at 12:46;  Status DC


Sodium Chloride 1,000 ml @  1,000 mls/hr Q1H PRN IV hypotension;  Start 4/6/20 

at 13:51;  Stop 4/6/20 at 19:50;  Status DC


Albumin Human 200 ml @  200 mls/hr 1X PRN  PRN IV Hypotension Last administered 

on 4/6/20at 14:51;  Start 4/6/20 at 14:00;  Stop 4/6/20 at 19:59;  Status DC


Diphenhydramine HCl (Benadryl) 25 mg 1X PRN  PRN IV ITCHING;  Start 4/6/20 at 

14:00;  Stop 4/7/20 at 13:59;  Status DC


Diphenhydramine HCl (Benadryl) 25 mg 1X PRN  PRN IV ITCHING;  Start 4/6/20 at 

14:00;  Stop 4/7/20 at 13:59;  Status DC


Sodium Chloride 1,000 ml @  400 mls/hr Q2H30M PRN IV PATENCY;  Start 4/6/20 at 

13:51;  Stop 4/7/20 at 01:50;  Status DC


Info (PHARMACY MONITORING -- do not chart) 1 each PRN DAILY  PRN MC SEE 

COMMENTS;  Start 4/6/20 at 14:00;  Stop 4/9/20 at 08:16;  Status DC


Heparin Sodium (Porcine) (Hep Lock Adult) 500 unit STK-MED ONCE IVP ;  Start 

4/7/20 at 09:29;  Stop 4/7/20 at 09:30;  Status DC


Sodium Chloride 1,000 ml @  1,000 mls/hr Q1H PRN IV hypotension;  Start 4/7/20 

at 10:43;  Stop 4/7/20 at 16:42;  Status DC


Sodium Chloride 1,000 ml @  400 mls/hr Q2H30M PRN IV PATENCY;  Start 4/7/20 at 

10:43;  Stop 4/7/20 at 22:42;  Status DC


Info (PHARMACY MONITORING -- do not chart) 1 each PRN DAILY  PRN MC SEE 

COMMENTS;  Start 4/7/20 at 10:45;  Status UNV


Info (PHARMACY MONITORING -- do not chart) 1 each PRN DAILY  PRN MC SEE 

COMMENTS;  Start 4/7/20 at 10:45;  Status UNV


Sodium Chloride 90 meq/Potassium Chloride 15 meq/ Magnesium Sulfate 12 

meq/Calcium Gluconate 15 meq/ Multivitamins 10 ml/Chromium/ Copper/Manganese/ 

Seleni/Zn 0.5 ml/ Insulin Human Regular 25 unit/ Total Parenteral 

Nutrition/Amino Acids/Dextrose/ Fat Emulsion Intravenous 1,400 ml @  58.333 mls/

hr TPN  CONT IV  Last administered on 4/7/20at 22:13;  Start 4/7/20 at 22:00;  

Stop 4/8/20 at 21:59;  Status DC


Sodium Chloride 1,000 ml @  1,000 mls/hr Q1H PRN IV hypotension;  Start 4/8/20 

at 07:50;  Stop 4/8/20 at 13:49;  Status DC


Albumin Human 200 ml @  200 mls/hr 1X  ONCE IV ;  Start 4/8/20 at 08:00;  Stop 

4/8/20 at 08:53;  Status DC


Diphenhydramine HCl (Benadryl) 25 mg 1X PRN  PRN IV ITCHING;  Start 4/8/20 at 

08:00;  Stop 4/9/20 at 07:59;  Status DC


Diphenhydramine HCl (Benadryl) 25 mg 1X PRN  PRN IV ITCHING;  Start 4/8/20 at 

08:00;  Stop 4/9/20 at 07:59;  Status DC


Info (PHARMACY MONITORING -- do not chart) 1 each PRN DAILY  PRN MC SEE 

COMMENTS;  Start 4/8/20 at 08:00;  Stop 4/9/20 at 08:16;  Status DC


Albumin Human 50 ml @ 50 mls/hr 1X  ONCE IV ;  Start 4/8/20 at 08:53;  Stop 

4/8/20 at 08:56;  Status DC


Albumin Human 200 ml @  50 mls/hr PRN 1X  PRN IV HYPOTENSION Last administered 

on 4/14/20at 11:54;  Start 4/8/20 at 09:00;  Stop 5/21/20 at 11:14;  Status DC


Meropenem 500 mg/ Sodium Chloride 50 ml @  100 mls/hr Q12H IV  Last administered

on 4/28/20at 10:45;  Start 4/8/20 at 10:00;  Stop 4/28/20 at 12:37;  Status DC


Sodium Chloride 90 meq/Magnesium Sulfate 12 meq/ Calcium Gluconate 15 meq/ Mu

ltivitamins 10 ml/Chromium/ Copper/Manganese/ Seleni/Zn 0.5 ml/ Insulin Human 

Regular 25 unit/ Total Parenteral Nutrition/Amino Acids/Dextrose/ Fat Emulsion 

Intravenous 1,400 ml @  58.333 mls/ hr TPN  CONT IV  Last administered on 

4/8/20at 21:41;  Start 4/8/20 at 22:00;  Stop 4/9/20 at 21:59;  Status DC


Sodium Chloride 1,000 ml @  1,000 mls/hr Q1H PRN IV hypotension;  Start 4/9/20 

at 07:58;  Stop 4/9/20 at 13:57;  Status DC


Albumin Human 200 ml @  200 mls/hr 1X PRN  PRN IV Hypotension Last administered 

on 4/9/20at 09:30;  Start 4/9/20 at 08:00;  Stop 4/9/20 at 13:59;  Status DC


Sodium Chloride 1,000 ml @  400 mls/hr Q2H30M PRN IV PATENCY;  Start 4/9/20 at 

07:58;  Stop 4/9/20 at 19:57;  Status DC


Info (PHARMACY MONITORING -- do not chart) 1 each PRN DAILY  PRN MC SEE 

COMMENTS;  Start 4/9/20 at 08:00;  Status Cancel


Info (PHARMACY MONITORING -- do not chart) 1 each PRN DAILY  PRN MC SEE 

COMMENTS;  Start 4/9/20 at 08:15;  Status UNV


Sodium Chloride 90 meq/Potassium Phosphate 5 mmol/ Magnesium Sulfate 12 meq/Calc

ium Gluconate 15 meq/ Multivitamins 10 ml/Chromium/ Copper/Manganese/ Seleni/Zn 

0.5 ml/ Insulin Human Regular 30 unit/ Total Parenteral Nutrition/Amino 

Acids/Dextrose/ Fat Emulsion Intravenous 1,400 ml @  58.333 mls/ hr TPN  CONT IV

 Last administered on 4/9/20at 22:08;  Start 4/9/20 at 22:00;  Stop 4/10/20 at 

21:59;  Status DC


Linezolid/Dextrose 300 ml @  300 mls/hr Q12HR IV  Last administered on 4/20/20at

20:40;  Start 4/10/20 at 11:00;  Stop 4/21/20 at 08:10;  Status DC


Sodium Chloride 90 meq/Potassium Phosphate 15 mmol/ Magnesium Sulfate 12 

meq/Calcium Gluconate 15 meq/ Multivitamins 10 ml/Chromium/ Copper/Manganese/ 

Seleni/Zn 0.5 ml/ Insulin Human Regular 30 unit/ Total Parenteral 

Nutrition/Amino Acids/Dextrose/ Fat Emulsion Intravenous 1,400 ml @  58.333 mls/

hr TPN  CONT IV  Last administered on 4/10/20at 21:49;  Start 4/10/20 at 22:00; 

Stop 4/11/20 at 21:59;  Status DC


Sodium Chloride 90 meq/Potassium Phosphate 15 mmol/ Magnesium Sulfate 12 

meq/Calcium Gluconate 15 meq/ Multivitamins 10 ml/Chromium/ Copper/Manganese/ 

Seleni/Zn 0.5 ml/ Insulin Human Regular 40 unit/ Total Parenteral 

Nutrition/Amino Acids/Dextrose/ Fat Emulsion Intravenous 1,400 ml @  58.333 mls/

hr TPN  CONT IV  Last administered on 4/11/20at 21:21;  Start 4/11/20 at 22:00; 

Stop 4/12/20 at 21:59;  Status DC


Sodium Chloride 1,000 ml @  1,000 mls/hr Q1H PRN IV hypotension;  Start 4/11/20 

at 13:26;  Stop 4/11/20 at 19:25;  Status DC


Albumin Human 200 ml @  200 mls/hr 1X PRN  PRN IV Hypotension Last administered 

on 4/11/20at 15:00;  Start 4/11/20 at 13:30;  Stop 4/11/20 at 19:29;  Status DC


Sodium Chloride (Normal Saline Flush) 10 ml 1X PRN  PRN IV AP catheter pack;  

Start 4/11/20 at 13:30;  Stop 4/12/20 at 13:29;  Status DC


Sodium Chloride (Normal Saline Flush) 10 ml 1X PRN  PRN IV  catheter pack;  

Start 4/11/20 at 13:30;  Stop 4/12/20 at 13:29;  Status DC


Sodium Chloride 1,000 ml @  400 mls/hr Q2H30M PRN IV PATENCY;  Start 4/11/20 at 

13:26;  Stop 4/12/20 at 01:25;  Status DC


Info (PHARMACY MONITORING -- do not chart) 1 each PRN DAILY  PRN MC SEE 

COMMENTS;  Start 4/11/20 at 13:30;  Stop 4/11/20 at 13:33;  Status DC


Info (PHARMACY MONITORING -- do not chart) 1 each PRN DAILY  PRN MC SEE 

COMMENTS;  Start 4/11/20 at 13:30;  Stop 4/11/20 at 13:34;  Status DC


Sodium Chloride 90 meq/Potassium Phosphate 19 mmol/ Magnesium Sulfate 12 

meq/Calcium Gluconate 15 meq/ Multivitamins 10 ml/Chromium/ Copper/Manganese/ 

Seleni/Zn 0.5 ml/ Insulin Human Regular 40 unit/ Total Parenteral 

Nutrition/Amino Acids/Dextrose/ Fat Emulsion Intravenous 1,400 ml @  58.333 mls/

hr TPN  CONT IV  Last administered on 4/12/20at 21:54;  Start 4/12/20 at 22:00; 

Stop 4/13/20 at 21:59;  Status DC


Sodium Chloride 1,000 ml @  1,000 mls/hr Q1H PRN IV hypotension;  Start 4/13/20 

at 09:35;  Stop 4/13/20 at 15:34;  Status DC


Albumin Human 200 ml @  200 mls/hr 1X PRN  PRN IV Hypotension;  Start 4/13/20 at

09:45;  Stop 4/13/20 at 15:44;  Status DC


Diphenhydramine HCl (Benadryl) 25 mg 1X PRN  PRN IV ITCHING;  Start 4/13/20 at 

09:45;  Stop 4/14/20 at 09:44;  Status DC


Diphenhydramine HCl (Benadryl) 25 mg 1X PRN  PRN IV ITCHING;  Start 4/13/20 at 

09:45;  Stop 4/14/20 at 09:44;  Status DC


Sodium Chloride 1,000 ml @  400 mls/hr Q2H30M PRN IV PATENCY;  Start 4/13/20 at 

09:35;  Stop 4/13/20 at 21:34;  Status DC


Info (PHARMACY MONITORING -- do not chart) 1 each PRN DAILY  PRN MC SEE 

COMMENTS;  Start 4/13/20 at 09:45;  Status Cancel


Sodium Chloride 100 meq/Potassium Phosphate 19 mmol/ Magnesium Sulfate 12 

meq/Calcium Gluconate 15 meq/ Multivitamins 10 ml/Chromium/ Copper/Manganese/ 

Seleni/Zn 0.5 ml/ Insulin Human Regular 40 unit/ Potassium Chloride 20 meq/ 

Total Parenteral Nutrition/Amino Acids/Dextrose/ Fat Emulsion Intravenous 1,400 

ml @  58.333 mls/ hr TPN  CONT IV  Last administered on 4/13/20at 22:02;  Start 

4/13/20 at 22:00;  Stop 4/14/20 at 21:59;  Status DC


Furosemide (Lasix) 40 mg 1X  ONCE IVP  Last administered on 4/13/20at 14:39;  

Start 4/13/20 at 14:30;  Stop 4/13/20 at 14:31;  Status DC


Metronidazole 100 ml @  100 mls/hr Q8HRS IV  Last administered on 4/21/20at 

06:04;  Start 4/14/20 at 10:00;  Stop 4/21/20 at 08:10;  Status DC


Sodium Chloride 1,000 ml @  1,000 mls/hr Q1H PRN IV hypotension;  Start 4/14/20 

at 08:00;  Stop 4/14/20 at 13:59;  Status DC


Albumin Human 200 ml @  200 mls/hr 1X PRN  PRN IV Hypotension;  Start 4/14/20 at

08:00;  Stop 4/14/20 at 13:59;  Status DC


Sodium Chloride 1,000 ml @  400 mls/hr Q2H30M PRN IV PATENCY;  Start 4/14/20 at 

08:00;  Stop 4/14/20 at 19:59;  Status DC


Info (PHARMACY MONITORING -- do not chart) 1 each PRN DAILY  PRN MC SEE 

COMMENTS;  Start 4/14/20 at 11:30;  Status UNV


Info (PHARMACY MONITORING -- do not chart) 1 each PRN DAILY  PRN MC SEE 

COMMENTS;  Start 4/14/20 at 11:30;  Stop 4/16/20 at 12:13;  Status DC


Sodium Chloride 100 meq/Potassium Phosphate 19 mmol/ Magnesium Sulfate 12 

meq/Calcium Gluconate 15 meq/ Multivitamins 10 ml/Chromium/ Copper/Manganese/ 

Seleni/Zn 0.5 ml/ Insulin Human Regular 40 unit/ Potassium Chloride 20 meq/ 

Total Parenteral Nutrition/Amino Acids/Dextrose/ Fat Emulsion Intravenous 1,400 

ml @  58.333 mls/ hr TPN  CONT IV  Last administered on 4/14/20at 21:52;  Start 

4/14/20 at 22:00;  Stop 4/15/20 at 21:59;  Status DC


Sodium Chloride (Normal Saline Flush) 10 ml QSHIFT  PRN IV AFTER MEDS AND BLOOD 

DRAWS;  Start 4/14/20 at 15:00;  Stop 5/12/20 at 11:27;  Status DC


Sodium Chloride (Normal Saline Flush) 10 ml PRN Q5MIN  PRN IV AFTER MEDS AND 

BLOOD DRAWS;  Start 4/14/20 at 15:00


Sodium Chloride (Normal Saline Flush) 20 ml PRN Q5MIN  PRN IV AFTER MEDS AND 

BLOOD DRAWS;  Start 4/14/20 at 15:00


Sodium Chloride 100 meq/Potassium Phosphate 19 mmol/ Magnesium Sulfate 12 

meq/Calcium Gluconate 15 meq/ Multivitamins 10 ml/Chromium/ Copper/Manganese/ 

Seleni/Zn 0.5 ml/ Insulin Human Regular 40 unit/ Potassium Chloride 20 meq/ 

Total Parenteral Nutrition/Amino Acids/Dextrose/ Fat Emulsion Intravenous 1,400 

ml @  58.333 mls/ hr TPN  CONT IV  Last administered on 4/15/20at 21:20;  Start 

4/15/20 at 22:00;  Stop 4/16/20 at 21:59;  Status DC


Lidocaine HCl (Buffered Lidocaine 1%) 3 ml STK-MED ONCE .ROUTE ;  Start 4/15/20 

at 13:16;  Stop 4/15/20 at 13:16;  Status DC


Lidocaine HCl (Buffered Lidocaine 1%) 6 ml 1X  ONCE INJ  Last administered on 

4/15/20at 13:45;  Start 4/15/20 at 13:30;  Stop 4/15/20 at 13:31;  Status DC


Albumin Human 100 ml @  100 mls/hr 1X  ONCE IV  Last administered on 4/15/20at 

15:41;  Start 4/15/20 at 15:00;  Stop 4/15/20 at 15:59;  Status DC


Albumin Human 50 ml @ 50 mls/hr 1X  ONCE IV  Last administered on 4/15/20at 1

5:00;  Start 4/15/20 at 15:00;  Stop 4/15/20 at 15:59;  Status DC


Info (PHARMACY MONITORING -- do not chart) 1 each PRN DAILY  PRN MC SEE 

COMMENTS;  Start 4/16/20 at 11:30;  Status Cancel


Info (PHARMACY MONITORING -- do not chart) 1 each PRN DAILY  PRN MC SEE 

COMMENTS;  Start 4/16/20 at 11:30;  Status UNV


Sodium Chloride 100 meq/Potassium Phosphate 10 mmol/ Magnesium Sulfate 12 meq

/Calcium Gluconate 15 meq/ Multivitamins 10 ml/Chromium/ Copper/Manganese/ 

Seleni/Zn 0.5 ml/ Insulin Human Regular 35 unit/ Potassium Chloride 20 meq/ 

Total Parenteral Nutrition/Amino Acids/Dextrose/ Fat Emulsion Intravenous 1,400 

ml @  58.333 mls/ hr TPN  CONT IV  Last administered on 4/16/20at 22:10;  Start 

4/16/20 at 22:00;  Stop 4/17/20 at 21:59;  Status DC


Sodium Chloride 100 meq/Potassium Phosphate 5 mmol/ Magnesium Sulfate 12 

meq/Calcium Gluconate 15 meq/ Multivitamins 10 ml/Chromium/ Copper/Manganese/ 

Seleni/Zn 0.5 ml/ Insulin Human Regular 35 unit/ Potassium Chloride 20 meq/ 

Total Parenteral Nutrition/Amino Acids/Dextrose/ Fat Emulsion Intravenous 1,400 

ml @  58.333 mls/ hr TPN  CONT IV  Last administered on 4/17/20at 22:59;  Start 

4/17/20 at 22:00;  Stop 4/18/20 at 21:59;  Status DC


Sodium Chloride 1,000 ml @  1,000 mls/hr Q1H PRN IV hypotension;  Start 4/18/20 

at 08:27;  Stop 4/18/20 at 14:26;  Status DC


Albumin Human 200 ml @  200 mls/hr 1X PRN  PRN IV Hypotension Last administered 

on 4/18/20at 09:18;  Start 4/18/20 at 08:30;  Stop 4/18/20 at 14:29;  Status DC


Sodium Chloride 1,000 ml @  400 mls/hr Q2H30M PRN IV PATENCY;  Start 4/18/20 at 

08:27;  Stop 4/18/20 at 20:26;  Status DC


Info (PHARMACY MONITORING -- do not chart) 1 each PRN DAILY  PRN MC SEE 

COMMENTS;  Start 4/18/20 at 08:30;  Status Cancel


Info (PHARMACY MONITORING -- do not chart) 1 each PRN DAILY  PRN MC SEE COM

MENTS;  Start 4/18/20 at 08:30;  Stop 4/26/20 at 13:10;  Status DC


Sodium Chloride 100 meq/Potassium Chloride 40 meq/ Magnesium Sulfate 15 

meq/Calcium Gluconate 15 meq/ Multivitamins 10 ml/Chromium/ Copper/Manganese/ 

Seleni/Zn 0.5 ml/ Insulin Human Regular 35 unit/ Total Parenteral 

Nutrition/Amino Acids/Dextrose/ Fat Emulsion Intravenous 1,400 ml @  58.333 mls/

hr TPN  CONT IV  Last administered on 4/18/20at 22:00;  Start 4/18/20 at 22:00; 

Stop 4/19/20 at 21:59;  Status DC


Potassium Chloride/Water 100 ml @  100 mls/hr 1X  ONCE IV  Last administered on 

4/18/20at 17:28;  Start 4/18/20 at 14:45;  Stop 4/18/20 at 15:44;  Status DC


Sodium Chloride 100 meq/Potassium Chloride 40 meq/ Magnesium Sulfate 15 

meq/Calcium Gluconate 15 meq/ Multivitamins 10 ml/Chromium/ Copper/Manganese/ 

Seleni/Zn 0.5 ml/ Insulin Human Regular 35 unit/ Total Parenteral 

Nutrition/Amino Acids/Dextrose/ Fat Emulsion Intravenous 1,400 ml @  58.333 mls/

hr TPN  CONT IV  Last administered on 4/19/20at 22:46;  Start 4/19/20 at 22:00; 

Stop 4/20/20 at 21:59;  Status DC


Sodium Chloride 100 meq/Potassium Chloride 40 meq/ Magnesium Sulfate 20 

meq/Calcium Gluconate 15 meq/ Multivitamins 10 ml/Chromium/ Copper/Manganese/ 

Seleni/Zn 0.5 ml/ Insulin Human Regular 35 unit/ Total Parenteral 

Nutrition/Amino Acids/Dextrose/ Fat Emulsion Intravenous 1,400 ml @  58.333 mls/

hr TPN  CONT IV  Last administered on 4/20/20at 22:31;  Start 4/20/20 at 22:00; 

Stop 4/21/20 at 21:59;  Status DC


Fentanyl Citrate (Fentanyl 2ml Vial) 50 mcg PRN Q2HR  PRN IVP PAIN Last admi

nistered on 4/27/20at 13:32;  Start 4/20/20 at 21:00;  Stop 4/28/20 at 12:53;  

Status DC


Fentanyl Citrate (Fentanyl 2ml Vial) 25 mcg PRN Q2HR  PRN IVP PAIN;  Start 

4/20/20 at 21:00;  Stop 4/28/20 at 12:54;  Status DC


Enoxaparin Sodium (Lovenox 100mg Syringe) 100 mg Q12HR SQ ;  Start 4/21/20 at 

21:00;  Status UNV


Amino Acids/ Glycerin/ Electrolytes 1,000 ml @  75 mls/hr Q04D71M IV ;  Start 

4/20/20 at 21:15;  Status UNV


Sodium Chloride 1,000 ml @  1,000 mls/hr Q1H PRN IV hypotension;  Start 4/21/20 

at 07:56;  Stop 4/21/20 at 13:55;  Status DC


Albumin Human 200 ml @  200 mls/hr 1X PRN  PRN IV Hypotension Last administered 

on 4/21/20at 08:40;  Start 4/21/20 at 08:00;  Stop 4/21/20 at 13:59;  Status DC


Sodium Chloride 1,000 ml @  400 mls/hr Q2H30M PRN IV PATENCY;  Start 4/21/20 at 

07:56;  Stop 4/21/20 at 19:55;  Status DC


Info (PHARMACY MONITORING -- do not chart) 1 each PRN DAILY  PRN MC SEE 

COMMENTS;  Start 4/21/20 at 08:00;  Status UNV


Info (PHARMACY MONITORING -- do not chart) 1 each PRN DAILY  PRN MC SEE COMM

ENTS;  Start 4/21/20 at 08:00;  Status UNV


Daptomycin 430 mg/ Sodium Chloride 50 ml @  100 mls/hr Q24H IV  Last 

administered on 4/21/20at 12:35;  Start 4/21/20 at 09:00;  Stop 4/21/20 at 

12:49;  Status DC


Sodium Chloride 100 meq/Potassium Chloride 40 meq/ Magnesium Sulfate 20 

meq/Calcium Gluconate 15 meq/ Multivitamins 10 ml/Chromium/ Copper/Manganese/ 

Seleni/Zn 0.5 ml/ Insulin Human Regular 35 unit/ Total Parenteral 

Nutrition/Amino Acids/Dextrose/ Fat Emulsion Intravenous 1,400 ml @  58.333 mls/

hr TPN  CONT IV  Last administered on 4/21/20at 21:26;  Start 4/21/20 at 22:00; 

Stop 4/22/20 at 21:59;  Status DC


Daptomycin 430 mg/ Sodium Chloride 50 ml @  100 mls/hr Q48H IV ;  Start 4/23/20 

at 09:00;  Stop 4/22/20 at 11:55;  Status DC


Sodium Chloride 100 meq/Potassium Chloride 40 meq/ Magnesium Sulfate 20 

meq/Calcium Gluconate 15 meq/ Multivitamins 10 ml/Chromium/ Copper/Manganese/ 

Seleni/Zn 0.5 ml/ Insulin Human Regular 35 unit/ Total Parenteral 

Nutrition/Amino Acids/Dextrose/ Fat Emulsion Intravenous 1,400 ml @  58.333 mls/

hr TPN  CONT IV  Last administered on 4/22/20at 22:27;  Start 4/22/20 at 22:00; 

Stop 4/23/20 at 21:59;  Status DC


Daptomycin 430 mg/ Sodium Chloride 50 ml @  100 mls/hr Q24H IV  Last 

administered on 4/24/20at 15:07;  Start 4/22/20 at 13:00;  Stop 4/25/20 at 

13:15;  Status DC


Sodium Chloride 100 meq/Potassium Chloride 40 meq/ Magnesium Sulfate 20 

meq/Calcium Gluconate 10 meq/ Multivitamins 10 ml/Chromium/ Copper/Manganese/ 

Seleni/Zn 0.5 ml/ Insulin Human Regular 35 unit/ Total Parenteral Nutr

ition/Amino Acids/Dextrose/ Fat Emulsion Intravenous 1,400 ml @  58.333 mls/ hr 

TPN  CONT IV  Last administered on 4/24/20at 00:06;  Start 4/23/20 at 22:00;  

Stop 4/24/20 at 21:59;  Status DC


Alteplase, Recombinant (Cathflo For Central Catheter Clearance) 1 mg 1X  ONCE 

INT CAT  Last administered on 4/24/20at 11:44;  Start 4/24/20 at 10:45;  Stop 

4/24/20 at 10:46;  Status DC


Ondansetron HCl (Zofran) 4 mg PRN Q6HRS  PRN IV NAUSEA/VOMITING;  Start 4/27/20 

at 07:00;  Stop 4/28/20 at 06:59;  Status DC


Fentanyl Citrate (Fentanyl 2ml Vial) 25 mcg PRN Q5MIN  PRN IV MILD PAIN 1-3;  

Start 4/27/20 at 07:00;  Stop 4/28/20 at 06:59;  Status DC


Fentanyl Citrate (Fentanyl 2ml Vial) 50 mcg PRN Q5MIN  PRN IV MODERATE TO SEVERE

PAIN Last administered on 4/27/20at 10:17;  Start 4/27/20 at 07:00;  Stop 

4/28/20 at 06:59;  Status DC


Ringer's Solution 1,000 ml @  30 mls/hr Q24H IV ;  Start 4/27/20 at 07:00;  Stop

4/27/20 at 18:59;  Status DC


Lidocaine HCl (Xylocaine-Mpf 1% 2ml Vial) 2 ml PRN 1X  PRN ID PRIOR TO IV START;

 Start 4/27/20 at 07:00;  Stop 4/28/20 at 06:59;  Status DC


Prochlorperazine Edisylate (Compazine) 5 mg PACU PRN  PRN IV NAUSEA, MRX1;  St

art 4/27/20 at 07:00;  Stop 4/28/20 at 06:59;  Status DC


Sodium Acetate 50 meq/Potassium Acetate 55 meq/ Magnesium Sulfate 20 meq/Calcium

Gluconate 10 meq/ Multivitamins 10 ml/Chromium/ Copper/Manganese/ Seleni/Zn 0.5 

ml/ Insulin Human Regular 35 unit/ Total Parenteral Nutrition/Amino 

Acids/Dextrose/ Fat Emulsion Intravenous 1,400 ml @  58.333 mls/ hr TPN  CONT IV

;  Start 4/24/20 at 22:00;  Stop 4/24/20 at 14:15;  Status DC


Sodium Acetate 50 meq/Potassium Acetate 55 meq/ Magnesium Sulfate 20 meq/Calcium

Gluconate 10 meq/ Multivitamins 10 ml/Chromium/ Copper/Manganese/ Seleni/Zn 0.5 

ml/ Insulin Human Regular 35 unit/ Total Parenteral Nutrition/Amino 

Acids/Dextrose/ Fat Emulsion Intravenous 1,800 ml @  75 mls/hr TPN  CONT IV  

Last administered on 4/24/20at 22:38;  Start 4/24/20 at 22:00;  Stop 4/25/20 at 

21:59;  Status DC


Sodium Chloride 1,000 ml @  1,000 mls/hr Q1H PRN IV hypotension;  Start 4/24/20 

at 15:31;  Stop 4/24/20 at 21:30;  Status DC


Diphenhydramine HCl (Benadryl) 25 mg 1X PRN  PRN IV ITCHING;  Start 4/24/20 at 

15:45;  Stop 4/25/20 at 15:44;  Status DC


Diphenhydramine HCl (Benadryl) 25 mg 1X PRN  PRN IV ITCHING;  Start 4/24/20 at 

15:45;  Stop 4/25/20 at 15:44;  Status DC


Sodium Chloride 1,000 ml @  400 mls/hr Q2H30M PRN IV PATENCY;  Start 4/24/20 at 

15:31;  Stop 4/25/20 at 03:30;  Status DC


Info (PHARMACY MONITORING -- do not chart) 1 each PRN DAILY  PRN MC SEE 

COMMENTS;  Start 4/24/20 at 15:45;  Stop 5/26/20 at 14:14;  Status DC


Sodium Acetate 50 meq/Potassium Acetate 55 meq/ Magnesium Sulfate 20 meq/Calcium

Gluconate 10 meq/ Multivitamins 10 ml/Chromium/ Copper/Manganese/ Seleni/Zn 0.5 

ml/ Insulin Human Regular 35 unit/ Total Parenteral Nutrition/Amino 

Acids/Dextrose/ Fat Emulsion Intravenous 1,800 ml @  75 mls/hr TPN  CONT IV  

Last administered on 4/25/20at 22:03;  Start 4/25/20 at 22:00;  Stop 4/26/20 at 

21:59;  Status DC


Daptomycin 430 mg/ Sodium Chloride 50 ml @  100 mls/hr Q24H IV  Last 

administered on 4/30/20at 13:00;  Start 4/25/20 at 13:00;  Stop 4/30/20 at 

20:58;  Status DC


Heparin Sodium (Porcine) 1000 unit/Sodium Chloride 1,001 ml @  1,001 mls/hr 1X  

ONCE IRR ;  Start 4/27/20 at 06:00;  Stop 4/27/20 at 06:59;  Status DC


Potassium Acetate 55 meq/Magnesium Sulfate 20 meq/ Calcium Gluconate 10 meq/ 

Multivitamins 10 ml/Chromium/ Copper/Manganese/ Seleni/Zn 0.5 ml/ Insulin Human 

Regular 35 unit/ Total Parenteral Nutrition/Amino Acids/Dextrose/ Fat Emulsion 

Intravenous 1,920 ml @  80 mls/hr TPN  CONT IV  Last administered on 4/26/20at 

22:10;  Start 4/26/20 at 22:00;  Stop 4/27/20 at 21:59;  Status DC


Dexamethasone Sodium Phosphate (Decadron) 4 mg STK-MED ONCE .ROUTE ;  Start 

4/27/20 at 10:56;  Stop 4/27/20 at 10:57;  Status DC


Ondansetron HCl (Zofran) 4 mg STK-MED ONCE .ROUTE ;  Start 4/27/20 at 10:56;  

Stop 4/27/20 at 10:57;  Status DC


Rocuronium Bromide (Zemuron) 50 mg STK-MED ONCE .ROUTE ;  Start 4/27/20 at 

10:56;  Stop 4/27/20 at 10:57;  Status DC


Fentanyl Citrate (Fentanyl 2ml Vial) 100 mcg STK-MED ONCE .ROUTE ;  Start 

4/27/20 at 10:56;  Stop 4/27/20 at 10:57;  Status DC


Bupivacaine HCl/ Epinephrine Bitart (Sensorcain-Epi 0.5%-1:032665 Mpf) 30 ml 

STK-MED ONCE .ROUTE  Last administered on 4/27/20at 12:01;  Start 4/27/20 at 

10:58;  Stop 4/27/20 at 10:58;  Status DC


Cellulose (Surgicel Hemostat 2x14) 1 each STK-MED ONCE .ROUTE ;  Start 4/27/20 

at 10:58;  Stop 4/27/20 at 10:59;  Status DC


Iohexol (Omnipaque 300 Mg/ml) 50 ml STK-MED ONCE .ROUTE ;  Start 4/27/20 at 

10:58;  Stop 4/27/20 at 10:59;  Status DC


Cellulose (Surgicel Hemostat 4x8) 1 each STK-MED ONCE .ROUTE ;  Start 4/27/20 at

10:58;  Stop 4/27/20 at 10:59;  Status DC


Bisacodyl (Dulcolax Supp) 10 mg STK-MED ONCE .ROUTE ;  Start 4/27/20 at 10:59;  

Stop 4/27/20 at 10:59;  Status DC


Heparin Sodium (Porcine) 1000 unit/Sodium Chloride 1,001 ml @  1,001 mls/hr 1X  

ONCE IRR ;  Start 4/27/20 at 12:00;  Stop 4/27/20 at 12:59;  Status DC


Propofol 20 ml @ As Directed STK-MED ONCE IV ;  Start 4/27/20 at 11:05;  Stop 

4/27/20 at 11:05;  Status DC


Sevoflurane (Ultane) 90 ml STK-MED ONCE IH ;  Start 4/27/20 at 11:05;  Stop 

4/27/20 at 11:05;  Status DC


Sevoflurane (Ultane) 60 ml STK-MED ONCE IH ;  Start 4/27/20 at 12:26;  Stop 

4/27/20 at 12:27;  Status DC


Propofol 20 ml @ As Directed STK-MED ONCE IV ;  Start 4/27/20 at 12:26;  Stop 

4/27/20 at 12:27;  Status DC


Phenylephrine HCl (PHENYLEPHRINE in 0.9% NACL PF) 1 mg STK-MED ONCE IV ;  Start 

4/27/20 at 12:34;  Stop 4/27/20 at 12:34;  Status DC


Heparin Sodium (Porcine) (Heparin Sodium) 5,000 unit Q12HR SQ  Last administered

on 5/6/20at 20:57;  Start 4/27/20 at 21:00;  Stop 5/7/20 at 09:59;  Status DC


Sodium Chloride (Normal Saline Flush) 3 ml QSHIFT  PRN IV AFTER MEDS AND BLOOD 

DRAWS;  Start 4/27/20 at 13:45;  Status Cancel


Naloxone HCl (Narcan) 0.4 mg PRN Q2MIN  PRN IV SEE INSTRUCTIONS Last 

administered on 6/6/20at 15:15;  Start 4/27/20 at 13:45;  Stop 7/1/20 at 16:00; 

Status DC


Sodium Chloride 1,000 ml @  25 mls/hr Q24H IV  Last administered on 5/26/20at 

13:37;  Start 4/27/20 at 13:37;  Stop 5/29/20 at 13:09;  Status DC


Naloxone HCl (Narcan) 0.4 mg PRN Q2MIN  PRN IV SEE INSTRUCTIONS;  Start 4/27/20 

at 14:30;  Status UNV


Sodium Chloride 1,000 ml @  25 mls/hr Q24H IV ;  Start 4/27/20 at 14:30;  Status

UNV


Hydromorphone HCl 30 ml @ 0 mls/hr CONT PRN  PRN IV PER PROTOCOL Last 

administered on 5/2/20at 16:08;  Start 4/27/20 at 14:30;  Stop 5/4/20 at 08:55; 

Status DC


Potassium Acetate 55 meq/Magnesium Sulfate 20 meq/ Calcium Gluconate 10 meq/ 

Multivitamins 10 ml/Chromium/ Copper/Manganese/ Seleni/Zn 0.5 ml/ Insulin Human 

Regular 35 unit/ Total Parenteral Nutrition/Amino Acids/Dextrose/ Fat Emulsion 

Intravenous 1,920 ml @  80 mls/hr TPN  CONT IV  Last administered on 4/27/20at 

22:01;  Start 4/27/20 at 22:00;  Stop 4/28/20 at 21:59;  Status DC


Bumetanide (Bumex) 2 mg [id number] IV  Last administered on 5/1/20at 13:50;  Start 

4/28/20 at 14:00;  Stop 5/2/20 at 14:10;  Status DC


Meropenem 1 gm/ Sodium Chloride 100 ml @  200 mls/hr Q8HRS IV  Last administered

on 5/22/20at 05:53;  Start 4/28/20 at 14:00;  Stop 5/22/20 at 09:31;  Status DC


Potassium Acetate 55 meq/Magnesium Sulfate 20 meq/ Calcium Gluconate 10 meq/ 

Multivitamins 10 ml/Chromium/ Copper/Manganese/ Seleni/Zn 0.5 ml/ Insulin Human 

Regular 35 unit/ Total Parenteral Nutrition/Amino Acids/Dextrose/ Fat Emulsion 

Intravenous 1,920 ml @  80 mls/hr TPN  CONT IV  Last administered on 4/28/20at 

22:02;  Start 4/28/20 at 22:00;  Stop 4/29/20 at 21:59;  Status DC


Hydromorphone HCl (Dilaudid Standard PCA) 12 mg STK-MED ONCE IV ;  Start 4/27/20

at 14:35;  Stop 4/28/20 at 13:53;  Status DC


Artificial Tears (Artificial Tears) 1 drop PRN Q15MIN  PRN OU DRY EYE Last 

administered on 6/23/20at 21:17;  Start 4/29/20 at 05:30


Hydromorphone HCl (Dilaudid Standard PCA) 12 mg STK-MED ONCE IV ;  Start 4/28/20

at 12:05;  Stop 4/29/20 at 09:15;  Status DC


Potassium Acetate 65 meq/Magnesium Sulfate 20 meq/ Calcium Gluconate 10 meq/ 

Multivitamins 10 ml/Chromium/ Copper/Manganese/ Seleni/Zn 0.5 ml/ Insulin Human 

Regular 30 unit/ Total Parenteral Nutrition/Amino Acids/Dextrose/ Fat Emulsion 

Intravenous 1,920 ml @  80 mls/hr TPN  CONT IV  Last administered on 4/29/20at 

22:22;  Start 4/29/20 at 22:00;  Stop 4/30/20 at 21:59;  Status DC


Cyclobenzaprine HCl (Flexeril) 10 mg PRN Q6HRS  PRN PO MUSCLE SPASMS Last 

administered on 7/8/20at 21:14;  Start 4/30/20 at 10:45


Potassium Acetate 55 meq/Magnesium Sulfate 20 meq/ Calcium Gluconate 10 meq/ 

Multivitamins 10 ml/Chromium/ Copper/Manganese/ Seleni/Zn 0.5 ml/ Insulin Human 

Regular 30 unit/ Total Parenteral Nutrition/Amino Acids/Dextrose/ Fat Emulsion 

Intravenous 1,920 ml @  80 mls/hr TPN  CONT IV  Last administered on 5/1/20at 

01:00;  Start 4/30/20 at 22:00;  Stop 5/1/20 at 21:59;  Status DC


Magnesium Sulfate 50 ml @ 25 mls/hr 1X  ONCE IV  Last administered on 4/30/20at 

17:18;  Start 4/30/20 at 12:45;  Stop 4/30/20 at 14:44;  Status DC


Potassium Chloride/Water 100 ml @  100 mls/hr 1X  ONCE IV  Last administered on 

5/1/20at 11:27;  Start 5/1/20 at 12:00;  Stop 5/1/20 at 12:59;  Status DC


Hydromorphone HCl (Dilaudid Standard PCA) 12 mg STK-MED ONCE IV ;  Start 4/29/20

at 10:50;  Stop 5/1/20 at 11:02;  Status DC


Hydromorphone HCl (Dilaudid Standard PCA) 12 mg STK-MED ONCE IV ;  Start 4/30/20

at 13:47;  Stop 5/1/20 at 11:03;  Status DC


Potassium Acetate 30 meq/Magnesium Sulfate 20 meq/ Calcium Gluconate 10 meq/ 

Multivitamins 10 ml/Chromium/ Copper/Manganese/ Seleni/Zn 0.5 ml/ Insulin Human 

Regular 30 unit/ Potassium Chloride 30 meq/ Total Parenteral Nutrition/Amino 

Acids/Dextrose/ Fat Emulsion Intravenous 1,920 ml @  80 mls/hr TPN  CONT IV  

Last administered on 5/1/20at 22:34;  Start 5/1/20 at 22:00;  Stop 5/2/20 at 

21:59;  Status DC


Potassium Chloride/Water 100 ml @  100 mls/hr Q1H IV  Last administered on 

5/2/20at 13:05;  Start 5/2/20 at 07:00;  Stop 5/2/20 at 10:59;  Status DC


Magnesium Sulfate 50 ml @ 25 mls/hr 1X  ONCE IV  Last administered on 5/2/20at 

10:34;  Start 5/2/20 at 10:30;  Stop 5/2/20 at 12:29;  Status DC


Potassium Chloride 75 meq/ Magnesium Sulfate 20 meq/Calcium Gluconate 10 meq/ 

Multivitamins 10 ml/Chromium/ Copper/Manganese/ Seleni/Zn 0.5 ml/ Insulin Human 

Regular 30 unit/ Total Parenteral Nutrition/Amino Acids/Dextrose/ Fat Emulsion 

Intravenous 1,920 ml @  80 mls/hr TPN  CONT IV  Last administered on 5/2/20at 

21:51;  Start 5/2/20 at 22:00;  Stop 5/3/20 at 22:00;  Status DC


Potassium Chloride 75 meq/ Magnesium Sulfate 20 meq/Calcium Gluconate 10 meq/ 

Multivitamins 10 ml/Chromium/ Copper/Manganese/ Seleni/Zn 0.5 ml/ Insulin Human 

Regular 25 unit/ Total Parenteral Nutrition/Amino Acids/Dextrose/ Fat Emulsion 

Intravenous 1,920 ml @  80 mls/hr TPN  CONT IV  Last administered on 5/3/20at 

22:04;  Start 5/3/20 at 22:00;  Stop 5/4/20 at 21:59;  Status DC


Hydromorphone HCl (Dilaudid) 0.4 mg PRN Q4HRS  PRN IVP PAIN Last administered on

5/4/20at 10:57;  Start 5/4/20 at 09:00;  Stop 5/4/20 at 18:59;  Status DC


Micafungin Sodium 100 mg/Dextrose 100 ml @  100 mls/hr Q24H IV  Last 

administered on 5/26/20at 12:17;  Start 5/4/20 at 11:00;  Stop 5/27/20 at 09:59;

 Status DC


Daptomycin 485 mg/ Sodium Chloride 50 ml @  100 mls/hr Q24H IV  Last 

administered on 5/11/20at 13:10;  Start 5/4/20 at 11:00;  Stop 5/12/20 at 07:44;

 Status DC


Potassium Chloride 75 meq/ Magnesium Sulfate 15 meq/Calcium Gluconate 8 meq/ 

Multivitamins 10 ml/Chromium/ Copper/Manganese/ Seleni/Zn 0.5 ml/ Insulin Human 

Regular 25 unit/ Total Parenteral Nutrition/Amino Acids/Dextrose/ Fat Emulsion 

Intravenous 1,920 ml @  80 mls/hr TPN  CONT IV  Last administered on 5/4/20at 

23:08;  Start 5/4/20 at 22:00;  Stop 5/5/20 at 21:59;  Status DC


Haloperidol Lactate (Haldol Inj) 3 mg 1X  ONCE IVP  Last administered on 

5/4/20at 14:37;  Start 5/4/20 at 14:30;  Stop 5/4/20 at 14:31;  Status DC


Hydromorphone HCl (Dilaudid) 1 mg PRN Q4HRS  PRN IVP PAIN Last administered on 

5/18/20at 06:25;  Start 5/4/20 at 19:00;  Stop 5/18/20 at 17:10;  Status DC


Potassium Chloride 75 meq/ Magnesium Sulfate 15 meq/Calcium Gluconate 8 meq/ 

Multivitamins 10 ml/Chromium/ Copper/Manganese/ Seleni/Zn 0.5 ml/ Insulin Human 

Regular 20 unit/ Total Parenteral Nutrition/Amino Acids/Dextrose/ Fat Emulsion 

Intravenous 1,920 ml @  80 mls/hr TPN  CONT IV  Last administered on 5/5/20at 

22:10;  Start 5/5/20 at 22:00;  Stop 5/6/20 at 21:59;  Status DC


Lidocaine HCl (Buffered Lidocaine 1%) 3 ml STK-MED ONCE .ROUTE ;  Start 5/6/20 

at 11:31;  Stop 5/6/20 at 11:31;  Status DC


Lidocaine HCl (Buffered Lidocaine 1%) 3 ml STK-MED ONCE .ROUTE ;  Start 5/6/20 

at 12:28;  Stop 5/6/20 at 12:29;  Status DC


Lidocaine HCl (Buffered Lidocaine 1%) 6 ml 1X  ONCE INJ  Last administered on 

5/6/20at 12:53;  Start 5/6/20 at 12:45;  Stop 5/6/20 at 12:46;  Status DC


Potassium Chloride 75 meq/ Magnesium Sulfate 15 meq/Calcium Gluconate 8 meq/ 

Multivitamins 10 ml/Chromium/ Copper/Manganese/ Seleni/Zn 0.5 ml/ Insulin Human 

Regular 20 unit/ Total Parenteral Nutrition/Amino Acids/Dextrose/ Fat Emulsion 

Intravenous 1,920 ml @  80 mls/hr TPN  CONT IV  Last administered on 5/6/20at 

22:00;  Start 5/6/20 at 22:00;  Stop 5/7/20 at 21:59;  Status DC


Potassium Chloride 75 meq/ Magnesium Sulfate 15 meq/Calcium Gluconate 8 meq/ 

Multivitamins 10 ml/Chromium/ Copper/Manganese/ Seleni/Zn 0.5 ml/ Insulin Human 

Regular 15 unit/ Total Parenteral Nutrition/Amino Acids/Dextrose/ Fat Emulsion 

Intravenous 1,920 ml @  80 mls/hr TPN  CONT IV  Last administered on 5/7/20at 

22:28;  Start 5/7/20 at 22:00;  Stop 5/8/20 at 21:59;  Status DC


Vecuronium Bromide (Norcuron Bolus) 6 mg PRN Q6HRS  PRN IV VENT ASYNCHRONY;  

Start 5/7/20 at 19:15;  Stop 5/7/20 at 19:35;  Status DC


Bumetanide (Bumex) 2 mg 1X  ONCE IV  Last administered on 5/7/20at 22:09;  Start

5/7/20 at 19:45;  Stop 5/7/20 at 19:46;  Status DC


Lidocaine HCl (Buffered Lidocaine 1%) 3 ml STK-MED ONCE .ROUTE ;  Start 5/8/20 

at 07:59;  Stop 5/8/20 at 07:59;  Status DC


Midazolam HCl (Versed) 5 mg STK-MED ONCE .ROUTE ;  Start 5/8/20 at 08:36;  Stop 

5/8/20 at 08:36;  Status DC


Fentanyl Citrate (Fentanyl 5ml Vial) 250 mcg STK-MED ONCE .ROUTE ;  Start 5/8/20

at 08:36;  Stop 5/8/20 at 08:37;  Status DC


Lidocaine HCl (Buffered Lidocaine 1%) 3 ml 1X  ONCE IJ  Last administered on 

5/8/20at 09:30;  Start 5/8/20 at 09:15;  Stop 5/8/20 at 09:16;  Status DC


Midazolam HCl (Versed) 5 mg 1X  ONCE IV  Last administered on 5/8/20at 09:30;  

Start 5/8/20 at 09:15;  Stop 5/8/20 at 09:16;  Status DC


Fentanyl Citrate (Fentanyl 5ml Vial) 250 mcg 1X  ONCE IV  Last administered on 

5/8/20at 09:30;  Start 5/8/20 at 09:15;  Stop 5/8/20 at 09:16;  Status DC


Bumetanide (Bumex) 2 mg DAILY IV  Last administered on 5/18/20at 08:07;  Start 

5/8/20 at 10:00;  Stop 5/18/20 at 17:15;  Status DC


Potassium Chloride 75 meq/ Magnesium Sulfate 15 meq/ Multivitamins 10 

ml/Chromium/ Copper/Manganese/ Seleni/Zn 0.5 ml/ Insulin Human Regular 15 unit/ 

Total Parenteral Nutrition/Amino Acids/Dextrose/ Fat Emulsion Intravenous 1,920 

ml @  80 mls/hr TPN  CONT IV  Last administered on 5/8/20at 21:59;  Start 5/8/20

at 22:00;  Stop 5/9/20 at 21:59;  Status DC


Metoclopramide HCl (Reglan Vial) 10 mg PRN Q3HRS  PRN IVP NAUSEA/VOMITING-3rd 

choice Last administered on 5/14/20at 04:25;  Start 5/9/20 at 16:45


Potassium Chloride 75 meq/ Magnesium Sulfate 15 meq/ Multivitamins 10 

ml/Chromium/ Copper/Manganese/ Seleni/Zn 0.5 ml/ Insulin Human Regular 15 unit/ 

Total Parenteral Nutrition/Amino Acids/Dextrose/ Fat Emulsion Intravenous 1,920 

ml @  80 mls/hr TPN  CONT IV  Last administered on 5/9/20at 22:41;  Start 5/9/20

at 22:00;  Stop 5/10/20 at 21:59;  Status DC


Magnesium Sulfate 50 ml @ 25 mls/hr 1X  ONCE IV  Last administered on 5/10/20at 

10:44;  Start 5/10/20 at 09:00;  Stop 5/10/20 at 10:59;  Status DC


Potassium Chloride/Water 100 ml @  100 mls/hr 1X  ONCE IV  Last administered on 

5/10/20at 09:37;  Start 5/10/20 at 09:00;  Stop 5/10/20 at 09:59;  Status DC


Duloxetine HCl (Cymbalta) 30 mg DAILY PO  Last administered on 5/11/20at 09:48; 

Start 5/10/20 at 14:00;  Stop 5/13/20 at 10:25;  Status DC


Potassium Chloride 80 meq/ Magnesium Sulfate 20 meq/ Multivitamins 10 ml/Chromi

um/ Copper/Manganese/ Seleni/Zn 0.5 ml/ Insulin Human Regular 15 unit/ Total 

Parenteral Nutrition/Amino Acids/Dextrose/ Fat Emulsion Intravenous 1,920 ml @  

80 mls/hr TPN  CONT IV  Last administered on 5/10/20at 21:42;  Start 5/10/20 at 

22:00;  Stop 5/11/20 at 21:59;  Status DC


Potassium Chloride 80 meq/ Magnesium Sulfate 20 meq/ Multivitamins 10 

ml/Chromium/ Copper/Manganese/ Seleni/Zn 0.5 ml/ Insulin Human Regular 15 unit/ 

Total Parenteral Nutrition/Amino Acids/Dextrose/ Fat Emulsion Intravenous 1,920 

ml @  80 mls/hr TPN  CONT IV  Last administered on 5/11/20at 22:20;  Start 

5/11/20 at 22:00;  Stop 5/12/20 at 21:59;  Status DC


Lidocaine HCl (Buffered Lidocaine 1%) 3 ml STK-MED ONCE .ROUTE ;  Start 5/12/20 

at 09:54;  Stop 5/12/20 at 09:55;  Status DC


Hydromorphone HCl (Dilaudid Standard PCA) 12 mg STK-MED ONCE IV ;  Start 5/1/20 

at 15:50;  Stop 5/12/20 at 11:24;  Status DC


Potassium Chloride 80 meq/ Magnesium Sulfate 20 meq/ Multivitamins 10 

ml/Chromium/ Copper/Manganese/ Seleni/Zn 0.5 ml/ Insulin Human Regular 15 unit/ 

Total Parenteral Nutrition/Amino Acids/Dextrose/ Fat Emulsion Intravenous 1,920 

ml @  80 mls/hr TPN  CONT IV  Last administered on 5/12/20at 21:40;  Start 

5/12/20 at 22:00;  Stop 5/13/20 at 21:59;  Status DC


Lidocaine HCl (Buffered Lidocaine 1%) 6 ml 1X  ONCE INJ  Last administered on 

5/12/20at 14:15;  Start 5/12/20 at 14:15;  Stop 5/12/20 at 14:16;  Status DC


Potassium Chloride 80 meq/ Magnesium Sulfate 20 meq/ Multivitamins 10 

ml/Chromium/ Copper/Manganese/ Seleni/Zn 1 ml/ Insulin Human Regular 15 unit/ 

Total Parenteral Nutrition/Amino Acids/Dextrose/ Fat Emulsion Intravenous 1,920 

ml @  80 mls/hr TPN  CONT IV  Last administered on 5/13/20at 22:04;  Start 

5/13/20 at 22:00;  Stop 5/14/20 at 21:59;  Status DC


Potassium Chloride/Water 100 ml @  100 mls/hr 1X  ONCE IV  Last administered on 

5/14/20at 11:34;  Start 5/14/20 at 11:00;  Stop 5/14/20 at 11:59;  Status DC


Potassium Chloride 90 meq/ Magnesium Sulfate 20 meq/ Multivitamins 10 

ml/Chromium/ Copper/Manganese/ Seleni/Zn 1 ml/ Insulin Human Regular 15 unit/ 

Total Parenteral Nutrition/Amino Acids/Dextrose/ Fat Emulsion Intravenous 1,920 

ml @  80 mls/hr TPN  CONT IV  Last administered on 5/14/20at 22:57;  Start 

5/14/20 at 22:00;  Stop 5/15/20 at 21:59;  Status DC


Potassium Chloride 90 meq/ Magnesium Sulfate 20 meq/ Multivitamins 10 

ml/Chromium/ Copper/Manganese/ Seleni/Zn 1 ml/ Insulin Human Regular 15 unit/ 

Total Parenteral Nutrition/Amino Acids/Dextrose/ Fat Emulsion Intravenous 1,920 

ml @  80 mls/hr TPN  CONT IV  Last administered on 5/15/20at 22:48;  Start 

5/15/20 at 22:00;  Stop 5/16/20 at 21:59;  Status DC


Potassium Chloride 90 meq/ Magnesium Sulfate 20 meq/ Multivitamins 10 

ml/Chromium/ Copper/Manganese/ Seleni/Zn 1 ml/ Insulin Human Regular 15 unit/ 

Total Parenteral Nutrition/Amino Acids/Dextrose/ Fat Emulsion Intravenous 1,890 

ml @  78.75 mls/ hr TPN  CONT IV  Last administered on 5/16/20at 22:15;  Start 

5/16/20 at 22:00;  Stop 5/17/20 at 21:59;  Status DC


Linezolid/Dextrose 300 ml @  300 mls/hr Q12HR IV  Last administered on 5/19/20at

21:08;  Start 5/17/20 at 09:00;  Stop 5/20/20 at 08:11;  Status DC


Daptomycin 450 mg/ Sodium Chloride 50 ml @  100 mls/hr Q24H IV  Last 

administered on 5/20/20at 09:25;  Start 5/17/20 at 09:00;  Stop 5/21/20 at 

08:30;  Status DC


Potassium Chloride 90 meq/ Magnesium Sulfate 20 meq/ Multivitamins 10 

ml/Chromium/ Copper/Manganese/ Seleni/Zn 1 ml/ Insulin Human Regular 15 unit/ 

Total Parenteral Nutrition/Amino Acids/Dextrose/ Fat Emulsion Intravenous 1,890 

ml @  78.75 mls/ hr TPN  CONT IV  Last administered on 5/17/20at 21:34;  Start 

5/17/20 at 22:00;  Stop 5/18/20 at 21:59;  Status DC


Lorazepam (Ativan Inj) 2 mg STK-MED ONCE .ROUTE ;  Start 5/17/20 at 14:58;  Stop

5/17/20 at 14:58;  Status DC


Metoprolol Tartrate (Lopressor Vial) 5 mg 1X  ONCE IVP  Last administered on 

5/17/20at 15:31;  Start 5/17/20 at 15:15;  Stop 5/17/20 at 15:16;  Status DC


Lorazepam (Ativan Inj) 2 mg 1X  ONCE IVP  Last administered on 5/17/20at 15:30; 

Start 5/17/20 at 15:15;  Stop 5/17/20 at 15:16;  Status DC


Enoxaparin Sodium (Lovenox 40mg Syringe) 40 mg Q24H SQ  Last administered on 

6/5/20at 17:44;  Start 5/17/20 at 17:00;  Stop 6/7/20 at 06:50;  Status DC


Lorazepam (Ativan Inj) 1 mg PRN Q4HRS  PRN IVP ANXIETY / AGITATION MILD-MOD Last

administered on 5/31/20at 15:55;  Start 5/17/20 at 19:15;  Stop 6/2/20 at 11:45;

 Status DC


Lorazepam (Ativan Inj) 2 mg PRN Q4HRS  PRN IVP ANXIETY / AGITATION SEVERE Last 

administered on 6/1/20at 07:55;  Start 5/17/20 at 19:15;  Stop 6/2/20 at 11:45; 

Status DC


Fentanyl Citrate (Fentanyl 2ml Vial) 50 mcg PRN Q4HRS  PRN IVP SEVERE PAIN Last 

administered on 6/13/20at 05:15;  Start 5/18/20 at 13:15;  Stop 6/14/20 at 

09:29;  Status DC


Fentanyl Citrate (Fentanyl 2ml Vial) 25 mcg PRN Q4HRS  PRN IVP MODERATE PAIN 

Last administered on 6/13/20at 00:27;  Start 5/18/20 at 13:15;  Stop 6/14/20 at 

09:30;  Status DC


Potassium Chloride 90 meq/ Magnesium Sulfate 20 meq/ Multivitamins 10 

ml/Chromium/ Copper/Manganese/ Seleni/Zn 1 ml/ Insulin Human Regular 15 unit/ 

Total Parenteral Nutrition/Amino Acids/Dextrose/ Fat Emulsion Intravenous 1,890 

ml @  78.75 mls/ hr TPN  CONT IV  Last administered on 5/18/20at 22:18;  Start 

5/18/20 at 22:00;  Stop 5/19/20 at 21:59;  Status DC


Furosemide (Lasix) 40 mg 1X  ONCE IVP  Last administered on 5/18/20at 21:51;  

Start 5/18/20 at 21:45;  Stop 5/18/20 at 21:48;  Status DC


Albumin Human 100 ml @  100 mls/hr 1X PRN  PRN IV SEE COMMENTS;  Start 5/19/20 

at 01:30


Furosemide (Lasix) 40 mg [id number] IVP  Last administered on 6/3/20at 08:04;  Start 

5/19/20 at 14:00;  Stop 6/3/20 at 13:07;  Status DC


Potassium Chloride 90 meq/ Magnesium Sulfate 20 meq/ Multivitamins 10 

ml/Chromium/ Copper/Manganese/ Seleni/Zn 1 ml/ Insulin Human Regular 15 unit/ 

Total Parenteral Nutrition/Amino Acids/Dextrose/ Fat Emulsion Intravenous 1,800 

ml @  75 mls/hr TPN  CONT IV  Last administered on 5/19/20at 22:31;  Start 

5/19/20 at 22:00;  Stop 5/20/20 at 21:59;  Status DC


Potassium Chloride 90 meq/ Magnesium Sulfate 20 meq/ Multivitamins 10 

ml/Chromium/ Copper/Manganese/ Seleni/Zn 1 ml/ Insulin Human Regular 15 unit/ 

Total Parenteral Nutrition/Amino Acids/Dextrose/ Fat Emulsion Intravenous 1,800 

ml @  75 mls/hr TPN  CONT IV  Last administered on 5/20/20at 22:28;  Start 

5/20/20 at 22:00;  Stop 5/21/20 at 21:59;  Status DC


Potassium Chloride 110 meq/ Magnesium Sulfate 20 meq/ Multivitamins 10 

ml/Chromium/ Copper/Manganese/ Seleni/Zn 1 ml/ Insulin Human Regular 15 unit/ 

Total Parenteral Nutrition/Amino Acids/Dextrose/ Fat Emulsion Intravenous 1,800 

ml @  75 mls/hr TPN  CONT IV  Last administered on 5/21/20at 22:01;  Start 

5/21/20 at 22:00;  Stop 5/22/20 at 21:59;  Status DC


Saliva Substitute (Biotene Moisturizing Mouth) 2 spray PRN Q15MIN  PRN PO DRY 

MOUTH;  Start 5/21/20 at 11:00


Potassium Chloride 110 meq/ Magnesium Sulfate 20 meq/ Multivitamins 10 

ml/Chromium/ Copper/Manganese/ Seleni/Zn 1 ml/ Insulin Human Regular 15 unit/ 

Total Parenteral Nutrition/Amino Acids/Dextrose/ Fat Emulsion Intravenous 1,800 

ml @  75 mls/hr TPN  CONT IV  Last administered on 5/22/20at 22:21;  Start 

5/22/20 at 22:00;  Stop 5/23/20 at 21:59;  Status DC


Potassium Chloride 110 meq/ Magnesium Sulfate 20 meq/ Multivitamins 10 

ml/Chromium/ Copper/Manganese/ Seleni/Zn 1 ml/ Insulin Human Regular 15 unit/ 

Total Parenteral Nutrition/Amino Acids/Dextrose/ Fat Emulsion Intravenous 1,800 

ml @  75 mls/hr TPN  CONT IV  Last administered on 5/23/20at 22:04;  Start 

5/23/20 at 22:00;  Stop 5/24/20 at 21:59;  Status DC


Potassium Chloride 110 meq/ Magnesium Sulfate 20 meq/ Multivitamins 10 

ml/Chromium/ Copper/Manganese/ Seleni/Zn 1 ml/ Insulin Human Regular 15 unit/ 

Total Parenteral Nutrition/Amino Acids/Dextrose/ Fat Emulsion Intravenous 1,800 

ml @  75 mls/hr TPN  CONT IV  Last administered on 5/24/20at 22:48;  Start 

5/24/20 at 22:00;  Stop 5/25/20 at 21:59;  Status DC


Potassium Chloride 70 meq/ Magnesium Sulfate 20 meq/ Multivitamins 10 

ml/Chromium/ Copper/Manganese/ Seleni/Zn 1 ml/ Insulin Human Regular 15 unit/ 

Total Parenteral Nutrition/Amino Acids/Dextrose/ Fat Emulsion Intravenous 1,800 

ml @  75 mls/hr TPN  CONT IV  Last administered on 5/25/20at 21:39;  Start 

5/25/20 at 22:00;  Stop 5/26/20 at 21:59;  Status DC


Meropenem 500 mg/ Sodium Chloride 50 ml @  100 mls/hr Q6HRS IV  Last 

administered on 5/27/20at 06:02;  Start 5/25/20 at 18:00;  Stop 5/27/20 at 

09:59;  Status DC


Barium Sulfate (Varibar Thin Liquid Apple) 148 gm 1X  ONCE PO ;  Start 5/26/20 

at 11:45;  Stop 5/26/20 at 11:49;  Status DC


Potassium Chloride 70 meq/ Magnesium Sulfate 20 meq/ Multivitamins 10 

ml/Chromium/ Copper/Manganese/ Seleni/Zn 1 ml/ Insulin Human Regular 15 unit/ 

Total Parenteral Nutrition/Amino Acids/Dextrose/ Fat Emulsion Intravenous 1,800 

ml @  75 mls/hr TPN  CONT IV  Last administered on 5/26/20at 22:27;  Start 

5/26/20 at 22:00;  Stop 5/27/20 at 21:59;  Status DC


Piperacillin Sod/ Tazobactam Sod 3.375 gm/Sodium Chloride 50 ml @  100 mls/hr 

Q6HRS IV  Last administered on 6/4/20at 06:10;  Start 5/27/20 at 12:00;  Stop 

6/4/20 at 07:26;  Status DC


Potassium Chloride 70 meq/ Magnesium Sulfate 20 meq/ Multivitamins 10 

ml/Chromium/ Copper/Manganese/ Seleni/Zn 1 ml/ Insulin Human Regular 15 unit/ 

Total Parenteral Nutrition/Amino Acids/Dextrose/ Fat Emulsion Intravenous 1,800 

ml @  75 mls/hr TPN  CONT IV  Last administered on 5/27/20at 22:03;  Start 

5/27/20 at 22:00;  Stop 5/28/20 at 21:59;  Status DC


Potassium Chloride 70 meq/ Magnesium Sulfate 20 meq/ Multivitamins 10 

ml/Chromium/ Copper/Manganese/ Seleni/Zn 1 ml/ Insulin Human Regular 15 unit/ 

Total Parenteral Nutrition/Amino Acids/Dextrose/ Fat Emulsion Intravenous 1,800 

ml @  75 mls/hr TPN  CONT IV  Last administered on 5/28/20at 22:33;  Start 

5/28/20 at 22:00;  Stop 5/29/20 at 21:59;  Status DC


Potassium Chloride 70 meq/ Magnesium Sulfate 20 meq/ Multivitamins 10 

ml/Chromium/ Copper/Manganese/ Seleni/Zn 1 ml/ Insulin Human Regular 15 unit/ 

Total Parenteral Nutrition/Amino Acids/Dextrose/ Fat Emulsion Intravenous 1,800 

ml @  75 mls/hr TPN  CONT IV  Last administered on 5/29/20at 23:13;  Start 

5/29/20 at 22:00;  Stop 5/30/20 at 21:59;  Status DC


Potassium Chloride 80 meq/ Magnesium Sulfate 20 meq/ Multivitamins 10 

ml/Chromium/ Copper/Manganese/ Seleni/Zn 1 ml/ Insulin Human Regular 15 unit/ 

Total Parenteral Nutrition/Amino Acids/Dextrose/ Fat Emulsion Intravenous 1,800 

ml @  75 mls/hr TPN  CONT IV  Last administered on 5/30/20at 22:30;  Start 

5/30/20 at 22:00;  Stop 5/31/20 at 21:59;  Status DC


Potassium Chloride 80 meq/ Magnesium Sulfate 20 meq/ Multivitamins 10 

ml/Chromium/ Copper/Manganese/ Seleni/Zn 1 ml/ Insulin Human Regular 15 unit/ To

[place] Parenteral Nutrition/Amino Acids/Dextrose/ Fat Emulsion Intravenous 1,800 ml

@  75 mls/hr TPN  CONT IV  Last administered on 5/31/20at 21:54;  Start 5/31/20 

at 22:00;  Stop 6/1/20 at 21:59;  Status DC


Potassium Chloride/Water 100 ml @  100 mls/hr 1X  ONCE IV  Last administered on 

6/1/20at 10:15;  Start 6/1/20 at 10:00;  Stop 6/1/20 at 10:59;  Status DC


Potassium Chloride 90 meq/ Magnesium Sulfate 20 meq/ Multivitamins 10 

ml/Chromium/ Copper/Manganese/ Seleni/Zn 1 ml/ Insulin Human Regular 20 unit/ 

Total Parenteral Nutrition/Amino Acids/Dextrose/ Fat Emulsion Intravenous 1,800 

ml @  75 mls/hr TPN  CONT IV  Last administered on 6/1/20at 22:28;  Start 6/1/20

at 22:00;  Stop 6/2/20 at 21:59;  Status DC


Potassium Chloride 90 meq/ Magnesium Sulfate 20 meq/ Multivitamins 10 

ml/Chromium/ Copper/Manganese/ Seleni/Zn 1 ml/ Insulin Human Regular 20 unit/ 

Total Parenteral Nutrition/Amino Acids/Dextrose/ Fat Emulsion Intravenous 1,800 

ml @  75 mls/hr TPN  CONT IV  Last administered on 6/2/20at 22:08;  Start 6/2/20

at 22:00;  Stop 6/3/20 at 21:59;  Status DC


Lorazepam (Ativan Inj) 0.25 mg PRN Q4HRS  PRN IVP ANXIETY / AGITATION Last 

administered on 6/27/20at 13:37;  Start 6/3/20 at 07:30


Potassium Chloride 90 meq/ Magnesium Sulfate 20 meq/ Multivitamins 10 

ml/Chromium/ Copper/Manganese/ Seleni/Zn 1 ml/ Insulin Human Regular 20 unit/ 

Total Parenteral Nutrition/Amino Acids/Dextrose/ Fat Emulsion Intravenous 1,800 

ml @  75 mls/hr TPN  CONT IV  Last administered on 6/3/20at 23:13;  Start 6/3/20

at 22:00;  Stop 6/4/20 at 21:59;  Status DC


Furosemide (Lasix) 40 mg DAILY IVP  Last administered on 6/5/20at 11:14;  Start 

6/3/20 at 13:30;  Stop 6/7/20 at 09:12;  Status DC


Fluoxetine HCl (PROzac) 20 mg QHS PEG  Last administered on 7/8/20at 21:05;  

Start 6/4/20 at 21:00


Fentanyl (Duragesic 50mcg/ Hr Patch) 1 patch Q72H TD  Last administered on 

6/4/20at 21:22;  Start 6/4/20 at 21:00;  Stop 6/13/20 at 12:00;  Status DC


Potassium Chloride 40 meq/ Potassium Acetate 60 meq/Magnesium Sulfate 10 meq/ 

Multivitamins 10 ml/Chromium/ Copper/Manganese/ Seleni/Zn 1 ml/ Insulin Human 

Regular 20 unit/ Total Parenteral Nutrition/Amino Acids/Dextrose/ Fat Emulsion 

Intravenous 1,800 ml @  75 mls/hr TPN  CONT IV  Last administered on 6/5/20at 

00:03;  Start 6/4/20 at 22:00;  Stop 6/5/20 at 21:59;  Status DC


Potassium Acetate 80 meq/Magnesium Sulfate 5 meq/ Multivitamins 10 ml/Chromium/ 

Copper/Manganese/ Seleni/Zn 1 ml/ Insulin Human Regular 20 unit/ Total 

Parenteral Nutrition/Amino Acids/Dextrose/ Fat Emulsion Intravenous 1,920 ml @  

80 mls/hr TPN  CONT IV  Last administered on 6/5/20at 21:59;  Start 6/5/20 at 

22:00;  Stop 6/6/20 at 21:59;  Status DC


Potassium Acetate 60 meq/Magnesium Sulfate 5 meq/ Multivitamins 10 ml/Chromium/ 

Copper/Manganese/ Seleni/Zn 1 ml/ Insulin Human Regular 30 unit/ Total Parenter

al Nutrition/Amino Acids/Dextrose/ Fat Emulsion Intravenous 1,920 ml @  80 

mls/hr TPN  CONT IV  Last administered on 6/6/20at 21:54;  Start 6/6/20 at 

22:00;  Stop 6/7/20 at 21:59;  Status DC


Norepinephrine Bitartrate 8 mg/ Dextrose 258 ml @  13.332 mls/ hr CONT  PRN IV 

PER PROTOCOL Last administered on 7/2/20at 09:09;  Start 6/7/20 at 06:30


Albumin Human 500 ml @  125 mls/hr 1X  ONCE IV  Last administered on 6/7/20at 

08:10;  Start 6/7/20 at 08:15;  Stop 6/7/20 at 12:14;  Status DC


Potassium Acetate 40 meq/Magnesium Sulfate 5 meq/ Multivitamins 10 ml/Chromium/ 

Copper/Manganese/ Seleni/Zn 1 ml/ Insulin Human Regular 30 unit/ Total Par

enteral Nutrition/Amino Acids/Dextrose/ Fat Emulsion Intravenous 1,920 ml @  80 

mls/hr TPN  CONT IV  Last administered on 6/7/20at 22:23;  Start 6/7/20 at 

22:00;  Stop 6/8/20 at 21:59;  Status DC


Meropenem 1 gm/ Sodium Chloride 100 ml @  200 mls/hr Q8HRS IV ;  Start 6/7/20 at

14:00;  Status Cancel


Meropenem 1 gm/ Sodium Chloride 100 ml @  200 mls/hr Q8HRS IV  Last administered

on 6/7/20at 11:04;  Start 6/7/20 at 10:00;  Stop 6/7/20 at 13:00;  Status DC


Meropenem 1 gm/ Sodium Chloride 100 ml @  200 mls/hr Q12HR IV  Last administered

on 6/25/20at 08:27;  Start 6/7/20 at 21:00;  Stop 6/25/20 at 08:56;  Status DC


Sodium Chloride 1,000 ml @  1,000 mls/hr 1X  ONCE IV  Last administered on 

6/7/20at 11:06;  Start 6/7/20 at 10:45;  Stop 6/7/20 at 11:44;  Status DC


Micafungin Sodium 100 mg/Dextrose 100 ml @  100 mls/hr Q24H IV  Last 

administered on 6/24/20at 12:34;  Start 6/7/20 at 11:00;  Stop 6/25/20 at 08:56;

 Status DC


Daptomycin 410 mg/ Sodium Chloride 50 ml @  100 mls/hr Q24H IV  Last 

administered on 6/9/20at 13:33;  Start 6/7/20 at 14:00;  Stop 6/10/20 at 08:30; 

Status DC


Midazolam HCl (Versed) 2 mg STK-MED ONCE .ROUTE ;  Start 6/7/20 at 14:47;  Stop 

6/7/20 at 14:48;  Status DC


Fentanyl Citrate (Fentanyl 2ml Vial) 100 mcg STK-MED ONCE .ROUTE ;  Start 6/7/20

at 14:47;  Stop 6/7/20 at 14:48;  Status DC


Flumazenil (Romazicon) 0.5 mg STK-MED ONCE IV ;  Start 6/7/20 at 14:48;  Stop 

6/7/20 at 14:48;  Status DC


Naloxone HCl (Narcan) 0.4 mg STK-MED ONCE .ROUTE ;  Start 6/7/20 at 14:48;  Stop

6/7/20 at 14:48;  Status DC


Lidocaine HCl (Lidocaine 1% 20ml Vial) 20 ml STK-MED ONCE .ROUTE ;  Start 6/7/20

at 14:48;  Stop 6/7/20 at 14:48;  Status DC


Midazolam HCl (Versed) 2 mg 1X  ONCE IV  Last administered on 6/7/20at 15:28;  

Start 6/7/20 at 15:00;  Stop 6/7/20 at 15:01;  Status DC


Fentanyl Citrate (Fentanyl 2ml Vial) 100 mcg 1X  ONCE IV  Last administered on 

6/7/20at 15:28;  Start 6/7/20 at 15:00;  Stop 6/7/20 at 15:01;  Status DC


Lidocaine HCl (Lidocaine 1% 20ml Vial) 20 ml 1X  ONCE INJ  Last administered on 

6/7/20at 15:30;  Start 6/7/20 at 15:00;  Stop 6/7/20 at 15:01;  Status DC


Sodium Chloride 1,000 ml @  100 mls/hr Q10H IV  Last administered on 6/16/20at 

07:30;  Start 6/7/20 at 20:00;  Stop 6/16/20 at 11:26;  Status DC


Sodium Bicarbonate (Sodium Bicarb Adult 8.4% Syr) 50 meq 1X  ONCE IV  Last 

administered on 6/7/20at 21:47;  Start 6/7/20 at 22:00;  Stop 6/7/20 at 22:01;  

Status DC


Potassium Acetate 40 meq/Magnesium Sulfate 5 meq/ Multivitamins 10 ml/Chromium/ 

Copper/Manganese/ Seleni/Zn 1 ml/ Insulin Human Regular 30 unit/ Total 

Parenteral Nutrition/Amino Acids/Dextrose/ Fat Emulsion Intravenous 1,920 ml @  

80 mls/hr TPN  CONT IV  Last administered on 6/8/20at 22:28;  Start 6/8/20 at 

22:00;  Stop 6/9/20 at 21:59;  Status DC


Sodium Chloride 500 ml @  500 mls/hr 1X  ONCE IV  Last administered on 6/9/20at 

06:39;  Start 6/9/20 at 06:45;  Stop 6/9/20 at 07:44;  Status DC


Potassium Acetate 40 meq/Magnesium Sulfate 5 meq/ Multivitamins 10 ml/Chromium/ 

Copper/Manganese/ Seleni/Zn 1 ml/ Insulin Human Regular 30 unit/ Total 

Parenteral Nutrition/Amino Acids/Dextrose/ Fat Emulsion Intravenous 1,920 ml @  

80 mls/hr TPN  CONT IV  Last administered on 6/9/20at 22:03;  Start 6/9/20 at 

22:00;  Stop 6/10/20 at 21:59;  Status DC


Metoprolol Tartrate (Lopressor Vial) 5 mg PRN Q6HRS  PRN IVP HYPERTENSION Last 

administered on 6/18/20at 10:14;  Start 6/10/20 at 09:00


Potassium Acetate 40 meq/Magnesium Sulfate 5 meq/ Multivitamins 10 ml/Chromium/ 

Copper/Manganese/ Seleni/Zn 1 ml/ Insulin Human Regular 30 unit/ Total 

Parenteral Nutrition/Amino Acids/Dextrose/ Fat Emulsion Intravenous 1,920 ml @  

80 mls/hr TPN  CONT IV  Last administered on 6/10/20at 21:26;  Start 6/10/20 at 

22:00;  Stop 6/11/20 at 21:59;  Status DC


Potassium Acetate 40 meq/Magnesium Sulfate 5 meq/ Multivitamins 10 ml/Chromium/ 

Copper/Manganese/ Seleni/Zn 1 ml/ Insulin Human Regular 30 unit/ Total 

Parenteral Nutrition/Amino Acids/Dextrose/ Fat Emulsion Intravenous 1,920 ml @  

80 mls/hr TPN  CONT IV  Last administered on 6/11/20at 23:23;  Start 6/11/20 at 

22:00;  Stop 6/12/20 at 21:59;  Status DC


Potassium Acetate 40 meq/Magnesium Sulfate 5 meq/ Multivitamins 10 ml/Chromium/ 

Copper/Manganese/ Seleni/Zn 1 ml/ Insulin Human Regular 30 unit/ Total 

Parenteral Nutrition/Amino Acids/Dextrose/ Fat Emulsion Intravenous 1,920 ml @  

80 mls/hr TPN  CONT IV  Last administered on 6/12/20at 21:35;  Start 6/12/20 at 

22:00;  Stop 6/13/20 at 21:59;  Status DC


Furosemide (Lasix) 20 mg 1X  ONCE IVP  Last administered on 6/13/20at 06:26;  

Start 6/13/20 at 06:15;  Stop 6/13/20 at 06:16;  Status DC


Methylprednisolone Sodium Succinate (SOLU-Medrol 125MG VIAL) 125 mg 1X  ONCE IV 

Last administered on 6/13/20at 06:26;  Start 6/13/20 at 06:15;  Stop 6/13/20 at 

06:16;  Status DC


Albuterol/ Ipratropium (Duoneb) 3 ml Q4HRS NEB  Last administered on 7/9/20at 

04:23;  Start 6/13/20 at 08:00


Fentanyl Citrate 30 ml @ 0 mls/hr CONT  PRN IV SEE PROTOCOL Last administered on

7/4/20at 08:03;  Start 6/13/20 at 06:00;  Stop 7/4/20 at 12:42;  Status DC


Propofol 100 ml @ 0 mls/hr CONT  PRN IV SEE PROTOCOL Last administered on 

6/20/20at 23:50;  Start 6/13/20 at 06:00


Fentanyl Citrate (Fentanyl 2ml Vial) 25 mcg PRN Q1HR  PRN IV SEE COMMENTS;  

Start 6/13/20 at 06:00


Fentanyl Citrate (Fentanyl 2ml Vial) 50 mcg PRN Q1HR  PRN IV SEE COMMENTS;  

Start 6/13/20 at 06:00


Chlorhexidine Gluconate (Peridex) 15 ml BID MM ;  Start 6/13/20 at 09:00;  Stop 

6/13/20 at 07:58;  Status DC


Potassium Acetate 40 meq/Magnesium Sulfate 5 meq/ Multivitamins 10 ml/Chromium/ 

Copper/Manganese/ Seleni/Zn 1 ml/ Insulin Human Regular 30 unit/ Total 

Parenteral Nutrition/Amino Acids/Dextrose/ Fat Emulsion Intravenous 1,920 ml @  

80 mls/hr TPN  CONT IV  Last administered on 6/13/20at 21:19;  Start 6/13/20 at 

22:00;  Stop 6/14/20 at 21:59;  Status DC


Acetylcysteine (Mucomyst 20% Resp Treatment) 600 mg BID NEB  Last administered 

on 6/19/20at 09:33;  Start 6/13/20 at 21:00;  Stop 6/19/20 at 10:39;  Status DC


Magnesium Sulfate 100 ml @  25 mls/hr 1X  ONCE IV  Last administered on 

6/13/20at 15:48;  Start 6/13/20 at 15:45;  Stop 6/13/20 at 19:44;  Status DC


Potassium Acetate 40 meq/Magnesium Sulfate 5 meq/ Multivitamins 10 ml/Chromium/ 

Copper/Manganese/ Seleni/Zn 1 ml/ Insulin Human Regular 30 unit/ Total Par

enteral Nutrition/Amino Acids/Dextrose/ Fat Emulsion Intravenous 1,920 ml @  80 

mls/hr TPN  CONT IV  Last administered on 6/14/20at 21:35;  Start 6/14/20 at 

22:00;  Stop 6/15/20 at 21:59;  Status DC


Potassium Chloride/Water 100 ml @  100 mls/hr Q1H IV  Last administered on 

6/15/20at 08:31;  Start 6/15/20 at 07:00;  Stop 6/15/20 at 08:59;  Status DC


Potassium Acetate 40 meq/Magnesium Sulfate 5 meq/ Multivitamins 10 ml/Chromium/ 

Copper/Manganese/ Seleni/Zn 1 ml/ Insulin Human Regular 30 unit/ Total 

Parenteral Nutrition/Amino Acids/Dextrose/ Fat Emulsion Intravenous 1,920 ml @  

80 mls/hr TPN  CONT IV  Last administered on 6/15/20at 21:54;  Start 6/15/20 at 

22:00;  Stop 6/16/20 at 19:34;  Status DC


Lidocaine HCl (Buffered Lidocaine 1%) 3 ml STK-MED ONCE .ROUTE ;  Start 6/15/20 

at 12:14;  Stop 6/15/20 at 12:14;  Status DC


Lidocaine HCl (Buffered Lidocaine 1%) 3 ml 1X  ONCE IJ  Last administered on 

6/15/20at 13:11;  Start 6/15/20 at 13:00;  Stop 6/15/20 at 13:01;  Status DC


Magnesium Sulfate 50 ml @ 25 mls/hr 1X  ONCE IV ;  Start 6/16/20 at 08:15;  Stop

6/16/20 at 10:14;  Status DC


Potassium Acetate 40 meq/Magnesium Sulfate 10 meq/ Multivitamins 10 ml/Chromium/

Copper/Manganese/ Seleni/Zn 1 ml/ Insulin Human Regular 20 unit/ Total 

Parenteral Nutrition/Amino Acids/Dextrose/ Fat Emulsion Intravenous 1,920 ml @  

80 mls/hr TPN  CONT IV  Last administered on 6/16/20at 21:32;  Start 6/16/20 at 

22:00;  Stop 6/17/20 at 21:59;  Status DC


Potassium Chloride/Water 100 ml @  100 mls/hr Q1H IV  Last administered on 

6/17/20at 09:12;  Start 6/17/20 at 08:00;  Stop 6/17/20 at 09:59;  Status DC


Alteplase, Recombinant (Cathflo For Central Catheter Clearance) 4 mg 1X  ONCE 

INT CAT ;  Start 6/17/20 at 09:15;  Stop 6/17/20 at 09:16;  Status UNV


Alteplase, Recombinant (Cathflo For Central Catheter Clearance) 4 mg 1X  ONCE 

INT CAT ;  Start 6/17/20 at 09:15;  Stop 6/17/20 at 09:16;  Status UNV


Alteplase, Recombinant (Cathflo For Central Catheter Clearance) 4 mg 1X  ONCE 

INT CAT ;  Start 6/17/20 at 09:15;  Stop 6/17/20 at 09:16;  Status UNV


Alteplase, Recombinant 4 mg/ Sodium Chloride 20 ml @ 20 mls/hr 1X  ONCE IV  Last

administered on 6/17/20at 10:10;  Start 6/17/20 at 10:00;  Stop 6/17/20 at 

10:59;  Status DC


Alteplase, Recombinant 4 mg/ Sodium Chloride 20 ml @ 20 mls/hr 1X  ONCE IV  Last

administered on 6/17/20at 10:09;  Start 6/17/20 at 10:00;  Stop 6/17/20 at 

10:59;  Status DC


Alteplase, Recombinant 4 mg/ Sodium Chloride 20 ml @ 20 mls/hr 1X  ONCE IV  Last

administered on 6/17/20at 10:09;  Start 6/17/20 at 10:00;  Stop 6/17/20 at 

10:59;  Status DC


Potassium Acetate 60 meq/Magnesium Sulfate 10 meq/ Multivitamins 10 ml/Chromium/

Copper/Manganese/ Seleni/Zn 1 ml/ Insulin Human Regular 20 unit/ Total 

Parenteral Nutrition/Amino Acids/Dextrose/ Fat Emulsion Intravenous 1,920 ml @  

80 mls/hr TPN  CONT IV  Last administered on 6/17/20at 21:55;  Start 6/17/20 at 

22:00;  Stop 6/18/20 at 21:59;  Status DC


Albumin Human 500 ml @  125 mls/hr 1X  ONCE IV  Last administered on 6/18/20at 

12:01;  Start 6/18/20 at 11:15;  Stop 6/18/20 at 15:14;  Status DC


Sodium Chloride 500 ml @  500 mls/hr 1X  ONCE IV  Last administered on 6/18/20at

13:50;  Start 6/18/20 at 11:15;  Stop 6/18/20 at 12:14;  Status DC


Potassium Acetate 60 meq/Magnesium Sulfate 14 meq/ Multivitamins 10 ml/Chromium/

Copper/Manganese/ Seleni/Zn 1 ml/ Insulin Human Regular 20 unit/ Total 

Parenteral Nutrition/Amino Acids/Dextrose/ Fat Emulsion Intravenous 1,920 ml @  

80 mls/hr TPN  CONT IV  Last administered on 6/18/20at 22:26;  Start 6/18/20 at 

22:00;  Stop 6/19/20 at 21:59;  Status DC


Ciprofloxacin/ Dextrose 200 ml @  200 mls/hr Q12HR IV  Last administered on 

6/25/20at 08:27;  Start 6/18/20 at 21:00;  Stop 6/25/20 at 08:56;  Status DC


Albumin Human 250 ml @  62.5 mls/hr 1X  ONCE IV  Last administered on 6/19/20at 

11:09;  Start 6/19/20 at 11:00;  Stop 6/19/20 at 14:59;  Status DC


Furosemide (Lasix) 20 mg 1X  ONCE IVP  Last administered on 6/19/20at 14:52;  

Start 6/19/20 at 10:45;  Stop 6/19/20 at 10:49;  Status DC


Potassium Acetate 60 meq/Magnesium Sulfate 14 meq/ Multivitamins 10 ml/Chromium/

Copper/Manganese/ Seleni/Zn 1 ml/ Insulin Human Regular 15 unit/ Total 

Parenteral Nutrition/Amino Acids/Dextrose/ Fat Emulsion Intravenous 1,920 ml @  

80 mls/hr TPN  CONT IV  Last administered on 6/19/20at 22:08;  Start 6/19/20 at 

22:00;  Stop 6/20/20 at 21:59;  Status DC


Potassium Acetate 60 meq/Magnesium Sulfate 14 meq/ Multivitamins 10 ml/Chromium/

Copper/Manganese/ Seleni/Zn 1 ml/ Insulin Human Regular 15 unit/ Total 

Parenteral Nutrition/Amino Acids/Dextrose/ Fat Emulsion Intravenous 1,920 ml @  

80 mls/hr TPN  CONT IV  Last administered on 6/20/20at 22:12;  Start 6/20/20 at 

22:00;  Stop 6/21/20 at 21:59;  Status DC


Potassium Acetate 60 meq/Magnesium Sulfate 14 meq/ Multivitamins 10 ml/Chromium/

Copper/Manganese/ Seleni/Zn 1 ml/ Insulin Human Regular 15 unit/ Total 

Parenteral Nutrition/Amino Acids/Dextrose/ Fat Emulsion Intravenous 1,920 ml @  

80 mls/hr TPN  CONT IV  Last administered on 6/21/20at 22:22;  Start 6/21/20 at 

22:00;  Stop 6/22/20 at 21:59;  Status DC


Furosemide (Lasix) 20 mg 1X  ONCE IVP  Last administered on 6/22/20at 11:07;  

Start 6/22/20 at 10:30;  Stop 6/22/20 at 10:34;  Status DC


Potassium Acetate 60 meq/Magnesium Sulfate 14 meq/ Multivitamins 10 ml/Chromium/

Copper/Manganese/ Seleni/Zn 1 ml/ Insulin Human Regular 15 unit/ Sodium Chloride

20 meq/Total Parenteral Nutrition/Amino Acids/Dextrose/ Fat Emulsion Intravenous

1,920 ml @  80 mls/hr TPN  CONT IV  Last administered on 6/22/20at 21:54;  Start

6/22/20 at 22:00;  Stop 6/23/20 at 21:59;  Status DC


Potassium Acetate 30 meq/Magnesium Sulfate 14 meq/ Multivitamins 10 ml/Chromium/

Copper/Manganese/ Seleni/Zn 1 ml/ Insulin Human Regular 15 unit/ Sodium Chloride

20 meq/Potassium Chloride 30 meq/ Total Parenteral Nutrition/Amino 

Acids/Dextrose/ Fat Emulsion Intravenous 1,920 ml @  80 mls/hr TPN  CONT IV  

Last administered on 6/23/20at 21:46;  Start 6/23/20 at 22:00;  Stop 6/24/20 at 

21:59;  Status DC


Sodium Chloride 80 meq/Potassium Chloride 30 meq/ Potassium Acetate 30 

meq/Magnesium Sulfate 14 meq/ Multivitamins 10 ml/Chromium/ Copper/Manganese/ 

Seleni/Zn 1 ml/ Insulin Human Regular 15 unit/ Total Parenteral Nutrition/Amino 

Acids/Dextrose/ Fat Emulsion Intravenous 1,920 ml @  80 mls/hr TPN  CONT IV  

Last administered on 6/24/20at 22:33;  Start 6/24/20 at 22:00;  Stop 6/25/20 at 

21:59;  Status DC


Furosemide (Lasix) 40 mg 1X  ONCE IVP  Last administered on 6/24/20at 16:27;  

Start 6/24/20 at 15:30;  Stop 6/24/20 at 15:33;  Status DC


Albumin Human 250 ml @  62.5 mls/hr 1X  ONCE IV  Last administered on 6/24/20at 

16:27;  Start 6/24/20 at 15:30;  Stop 6/24/20 at 19:29;  Status DC


Sodium Chloride 80 meq/Potassium Chloride 30 meq/ Potassium Acetate 30 

meq/Magnesium Sulfate 14 meq/ Multivitamins 10 ml/Chromium/ Copper/Manganese/ 

Seleni/Zn 1 ml/ Insulin Human Regular 15 unit/ Total Parenteral Nutrition/Amino 

Acids/Dextrose/ Fat Emulsion Intravenous 1,920 ml @  80 mls/hr TPN  CONT IV  

Last administered on 6/25/20at 22:25;  Start 6/25/20 at 22:00;  Stop 6/26/20 at 

21:59;  Status DC


Sodium Chloride 80 meq/Potassium Chloride 30 meq/ Potassium Acetate 30 

meq/Magnesium Sulfate 14 meq/ Multivitamins 10 ml/Chromium/ Copper/Manganese/ 

Seleni/Zn 1 ml/ Insulin Human Regular 15 unit/ Total Parenteral Nutrition/Amino 

Acids/Dextrose/ Fat Emulsion Intravenous 1,920 ml @  80 mls/hr TPN  CONT IV  

Last administered on 6/26/20at 21:32;  Start 6/26/20 at 22:00;  Stop 6/27/20 at 

21:59;  Status DC


Sodium Chloride 80 meq/Potassium Chloride 30 meq/ Potassium Acetate 30 

meq/Magnesium Sulfate 14 meq/ Multivitamins 10 ml/Chromium/ Copper/Manganese/ 

Seleni/Zn 1 ml/ Insulin Human Regular 15 unit/ Total Parenteral Nutrition/Amino 

Acids/Dextrose/ Fat Emulsion Intravenous 1,920 ml @  80 mls/hr TPN  CONT IV  

Last administered on 6/27/20at 21:53;  Start 6/27/20 at 22:00;  Stop 6/28/20 at 

21:59;  Status DC


Acetylcysteine (Mucomyst 20% Resp Treatment) 600 mg RTBID NEB  Last administered

on 7/8/20at 20:00;  Start 6/27/20 at 12:00


Sodium Chloride 80 meq/Potassium Chloride 30 meq/ Potassium Acetate 30 

meq/Magnesium Sulfate 14 meq/ Multivitamins 10 ml/Chromium/ Copper/Manganese/ 

Seleni/Zn 1 ml/ Insulin Human Regular 15 unit/ Total Parenteral Nutrition/Amino 

Acids/Dextrose/ Fat Emulsion Intravenous 1,920 ml @  80 mls/hr TPN  CONT IV  Las

t administered on 6/28/20at 22:06;  Start 6/28/20 at 22:00;  Stop 6/29/20 at 

21:59;  Status DC


Meropenem 500 mg/ Sodium Chloride 50 ml @  100 mls/hr Q6HRS IV  Last 

administered on 7/9/20at 06:12;  Start 6/28/20 at 18:00


Daptomycin 500 mg/ Sodium Chloride 50 ml @  100 mls/hr Q24H IV  Last 

administered on 7/6/20at 21:47;  Start 6/28/20 at 19:00;  Stop 7/7/20 at 08:13; 

Status DC


Sodium Chloride 80 meq/Potassium Chloride 30 meq/ Potassium Acetate 30 

meq/Magnesium Sulfate 14 meq/ Multivitamins 10 ml/Chromium/ Copper/Manganese/ 

Seleni/Zn 1 ml/ Insulin Human Regular 15 unit/ Total Parenteral Nutrition/Amino 

Acids/Dextrose/ Fat Emulsion Intravenous 1,920 ml @  80 mls/hr TPN  CONT IV  

Last administered on 6/29/20at 22:09;  Start 6/29/20 at 22:00;  Stop 6/30/20 at 

21:59;  Status DC


Heparin Sodium (Porcine) 1000 unit/Sodium Chloride 1,001 ml @  1,001 mls/hr 1X  

ONCE IRR ;  Start 6/30/20 at 06:00;  Stop 6/30/20 at 06:59;  Status DC


Propofol (Diprivan) 200 mg STK-MED ONCE IV ;  Start 6/30/20 at 07:44;  Stop 

6/30/20 at 07:44;  Status DC


Lidocaine HCl (Lidocaine Pf 2% Vial) 5 ml STK-MED ONCE .ROUTE ;  Start 6/30/20 

at 07:44;  Stop 6/30/20 at 07:44;  Status DC


Fentanyl Citrate (Fentanyl 2ml Vial) 100 mcg STK-MED ONCE .ROUTE ;  Start 

6/30/20 at 07:44;  Stop 6/30/20 at 07:44;  Status DC


Rocuronium Bromide (Zemuron) 100 mg STK-MED ONCE .ROUTE ;  Start 6/30/20 at 

07:44;  Stop 6/30/20 at 07:44;  Status DC


Micafungin Sodium 100 mg/Dextrose 100 ml @  100 mls/hr Q24H IV  Last 

administered on 7/8/20at 08:07;  Start 6/30/20 at 08:30


Bupivacaine HCl/ Epinephrine Bitart (Sensorcain-Epi 0.5%-1:[id number] Mpf) 30 ml 

STK-MED ONCE .ROUTE ;  Start 6/30/20 at 08:34;  Stop 6/30/20 at 08:35;  Status 

DC


Iohexol (Omnipaque 300 Mg/ml) 50 ml STK-MED ONCE .ROUTE  Last administered on 

6/30/20at 13:30;  Start 6/30/20 at 08:35;  Stop 6/30/20 at 08:35;  Status DC


Sodium Chloride 80 meq/Potassium Chloride 30 meq/ Potassium Acetate 30 m

eq/Magnesium Sulfate 14 meq/ Multivitamins 10 ml/Chromium/ Copper/Manganese/ 

Seleni/Zn 1 ml/ Insulin Human Regular 15 unit/ Total Parenteral Nutrition/Amino 

Acids/Dextrose/ Fat Emulsion Intravenous 1,920 ml @  80 mls/hr TPN  CONT IV  

Last administered on 7/1/20at 01:22;  Start 6/30/20 at 22:00;  Stop 7/1/20 at 

21:59;  Status DC


Phenylephrine HCl (Ken-Synephrine Inj) 10 mg STK-MED ONCE .ROUTE ;  Start 

6/30/20 at 10:15;  Stop 6/30/20 at 10:15;  Status DC


Desflurane (Suprane) 90 ml STK-MED ONCE IH ;  Start 6/30/20 at 10:18;  Stop 

6/30/20 at 10:19;  Status DC


Albumin Human 500 ml @  As Directed STK-MED ONCE IV ;  Start 6/30/20 at 11:06;  

Stop 6/30/20 at 11:06;  Status DC


Vasopressin (Vasostrict) 20 unit STK-MED ONCE .ROUTE ;  Start 6/30/20 at 12:23; 

Stop 6/30/20 at 12:23;  Status DC


Phenylephrine HCl (Ken-Synephrine Inj) 10 mg STK-MED ONCE .ROUTE ;  Start 

6/30/20 at 13:33;  Stop 6/30/20 at 13:33;  Status DC


Phenylephrine HCl (Ken-Synephrine Inj) 10 mg STK-MED ONCE .ROUTE ;  Start 

6/30/20 at 13:33;  Stop 6/30/20 at 13:33;  Status DC


Ondansetron HCl (Zofran) 4 mg STK-MED ONCE .ROUTE ;  Start 6/30/20 at 13:33;  

Stop 6/30/20 at 13:33;  Status DC


Enoxaparin Sodium (Lovenox 40mg Syringe) 40 mg Q24H SQ  Last administered on 

7/8/20at 08:08;  Start 7/1/20 at 08:00


Sodium Chloride (Normal Saline Flush) 3 ml QSHIFT  PRN IV AFTER MEDS AND BLOOD 

DRAWS;  Start 6/30/20 at 14:45


Naloxone HCl (Narcan) 0.4 mg PRN Q2MIN  PRN IV SEE INSTRUCTIONS;  Start 6/30/20 

at 14:45


Sodium Chloride 1,000 ml @  25 mls/hr Q24H IV  Last administered on 7/8/20at 

21:15;  Start 6/30/20 at 14:33


Morphine Sulfate (Morphine Sulfate) 1 mg PRN Q1HR  PRN IV PAIN;  Start 6/30/20 

at 14:45


Midazolam HCl 100 mg/Sodium Chloride 100 ml @ 1 mls/hr CONT  PRN IV SEE I/O 

RECORD Last administered on 7/3/20at 18:48;  Start 6/30/20 at 14:45


Phenylephrine HCl (PHENYLEPHRINE in 0.9% NACL PF) 1 mg STK-MED ONCE IV ;  Start 

6/30/20 at 14:44;  Stop 6/30/20 at 14:45;  Status DC


Ephedrine Sulfate (ePHEDrine PF IN SALINE SYRINGE) 50 mg STK-MED ONCE IV ;  

Start 6/30/20 at 14:45;  Stop 6/30/20 at 14:45;  Status DC


Vasopressin 20 unit/Dextrose 101 ml @  12 mls/hr CONT  PRN IV SEE I/O RECORD 

Last administered on 7/7/20at 04:17;  Start 6/30/20 at 15:30


Sodium Chloride 1,000 ml @  1,000 mls/hr 1X  ONCE IV  Last administered on 

6/30/20at 15:42;  Start 6/30/20 at 15:45;  Stop 6/30/20 at 16:44;  Status DC


Albumin Human 500 ml @  125 mls/hr 1X  ONCE IV ;  Start 6/30/20 at 16:00;  Stop 

6/30/20 at 19:59;  Status DC


Albumin Human 500 ml @  125 mls/hr PRN Q1HR  PRN IV PER PROTOCOL;  Start 6/30/20

at 15:45


Magnesium Sulfate 50 ml @ 25 mls/hr 1X  ONCE IV  Last administered on 6/30/20at 

17:02;  Start 6/30/20 at 16:30;  Stop 6/30/20 at 18:29;  Status DC


Sodium Bicarbonate (Sodium Bicarb Adult 8.4% Syr) 50 meq STK-MED ONCE .ROUTE ;  

Start 6/30/20 at 16:20;  Stop 6/30/20 at 16:20;  Status DC


Sodium Bicarbonate (Sodium Bicarb Adult 8.4% Syr) 100 meq 1X  ONCE IV  Last 

administered on 6/30/20at 17:07;  Start 6/30/20 at 16:30;  Stop 6/30/20 at 

16:31;  Status DC


Sodium Bicarbonate 150 meq/Dextrose 1,150 ml @  75 mls/hr 1X  ONCE IV  Last 

administered on 6/30/20at 20:02;  Start 6/30/20 at 16:30;  Stop 7/1/20 at 07:49;

 Status DC


Sodium Chloride 80 meq/Potassium Chloride 30 meq/ Potassium Acetate 30 

meq/Magnesium Sulfate 14 meq/ Multivitamins 10 ml/Chromium/ Copper/Manganese/ 

Seleni/Zn 1 ml/ Insulin Human Regular 15 unit/ Total Parenteral Nutrition/Amino 

Acids/Dextrose/ Fat Emulsion Intravenous 1,920 ml @  80 mls/hr TPN  CONT IV  

Last administered on 7/1/20at 23:05;  Start 7/1/20 at 22:00;  Stop 7/2/20 at 

21:59;  Status DC


Sodium Chloride 100 meq/Potassium Chloride 30 meq/ Potassium Acetate 30 

meq/Magnesium Sulfate 12 meq/ Multivitamins 10 ml/Chromium/ Copper/Manganese/ 

Seleni/Zn 1 ml/ Insulin Human Regular 15 unit/ Total Parenteral Nutrition/Amino 

Acids/Dextrose/ Fat Emulsion Intravenous 1,920 ml @  80 mls/hr TPN  CONT IV  

Last administered on 7/2/20at 21:52;  Start 7/2/20 at 22:00;  Stop 7/3/20 at 

21:59;  Status DC


Sodium Chloride 100 meq/Potassium Chloride 30 meq/ Potassium Acetate 30 

meq/Magnesium Sulfate 12 meq/ Multivitamins 10 ml/Chromium/ Copper/Manganese/ 

Seleni/Zn 1 ml/ Insulin Human Regular 15 unit/ Total Parenteral Nutrition/Amino 

Acids/Dextrose/ Fat Emulsion Intravenous 1,920 ml @  80 mls/hr TPN  CONT IV  

Last administered on 7/3/20at 21:46;  Start 7/3/20 at 22:00;  Stop 7/4/20 at 

21:59;  Status DC


Sodium Chloride 100 meq/Potassium Chloride 30 meq/ Potassium Acetate 30 

meq/Magnesium Sulfate 12 meq/ Multivitamins 10 ml/Chromium/ Copper/Manganese/ 

Seleni/Zn 1 ml/ Insulin Human Regular 15 unit/ Total Parenteral Nutrition/Amino 

Acids/Dextrose/ Fat Emulsion Intravenous 1,800 ml @  75 mls/hr TPN  CONT IV  

Last administered on 7/4/20at 22:04;  Start 7/4/20 at 22:00;  Stop 7/5/20 at 

21:59;  Status DC


Fentanyl Citrate 55 ml @ 0 mls/hr CONT  PRN IV SEE COMMENTS Last administered on

7/6/20at 23:55;  Start 7/4/20 at 13:00


Sodium Chloride 100 meq/Potassium Chloride 30 meq/ Potassium Acetate 30 m

eq/Magnesium Sulfate 12 meq/ Multivitamins 10 ml/Chromium/ Copper/Manganese/ 

Seleni/Zn 1 ml/ Insulin Human Regular 15 unit/ Total Parenteral Nutrition/Amino 

Acids/Dextrose/ Fat Emulsion Intravenous 1,680 ml @  70 mls/hr TPN  CONT IV  

Last administered on 7/5/20at 21:23;  Start 7/5/20 at 22:00;  Stop 7/6/20 at 

21:59;  Status DC


Sodium Chloride 110 meq/Potassium Chloride 30 meq/ Potassium Acetate 30 

meq/Magnesium Sulfate 15 meq/ Multivitamins 10 ml/Chromium/ Copper/Manganese/ 

Seleni/Zn 1 ml/ Insulin Human Regular 15 unit/ Total Parenteral Nutrition/Amino 

Acids/Dextrose/ Fat Emulsion Intravenous 1,680 ml @  70 mls/hr TPN  CONT IV  

Last administered on 7/6/20at 21:48;  Start 7/6/20 at 22:00;  Stop 7/7/20 at 

21:59;  Status DC


Sodium Chloride 110 meq/Potassium Chloride 30 meq/ Potassium Acetate 30 

meq/Magnesium Sulfate 15 meq/ Multivitamins 10 ml/Chromium/ Copper/Manganese/ 

Seleni/Zn 1 ml/ Insulin Human Regular 15 unit/ Total Parenteral Nutrition/Amino 

Acids/Dextrose/ Fat Emulsion Intravenous 1,680 ml @  70 mls/hr TPN  CONT IV  

Last administered on 7/7/20at 21:33;  Start 7/7/20 at 22:00;  Stop 7/8/20 at 

21:59;  Status DC


Sodium Chloride 110 meq/Potassium Chloride 30 meq/ Potassium Acetate 30 

meq/Magnesium Sulfate 15 meq/ Multivitamins 10 ml/Chromium/ Copper/Manganese/ 

Seleni/Zn 1 ml/ Insulin Human Regular 15 unit/ Total Parenteral Nutrition/Amino 

Acids/Dextrose/ Fat Emulsion Intravenous 1,680 ml @  70 mls/hr TPN  CONT IV  

Last administered on 7/8/20at 21:51;  Start 7/8/20 at 22:00;  Stop 7/9/20 at 

21:59





Active Scripts


Active


Reported


Bisoprolol Fumarate 5 Mg Tablet 10 Mg PO DAILY


Vitals/I & O





Vital Sign - Last 24 Hours








 7/8/20 7/8/20 7/8/20 7/8/20





 08:00 08:00 08:27 09:00


 


Temp  99.4  





  99.4  


 


Pulse  116  115


 


Resp  18  24


 


B/P (MAP)  149/94 (112)  135/67 (89)


 


Pulse Ox  100 100 100


 


O2 Delivery Mechanical Ventilator Ventilator Ventilator Ventilator


 


    





    





 7/8/20 7/8/20 7/8/20 7/8/20





 10:00 11:00 12:00 12:00


 


Temp    99.3





    99.3


 


Pulse 110 118  121


 


Resp 45 27  26


 


B/P (MAP) 126/69 (88) 137/81 (99)  139/82 (101)


 


Pulse Ox 100 100  100


 


O2 Delivery Ventilator Ventilator Trach Collar Tracheal Collar


 


O2 Flow Rate   10.0 10.0


 


    





    





 7/8/20 7/8/20 7/8/20 7/8/20





 12:07 13:00 14:00 15:00


 


Pulse  118 116 114


 


Resp  28 29 26


 


B/P (MAP)  132/83 (99) 144/78 (100) 120/60 (80)


 


Pulse Ox 99 100 100 100


 


O2 Delivery Tracheal Collar Tracheal Collar Tracheal Collar Tracheal Collar


 


O2 Flow Rate 10.0 10.0 10.0 10.0





 7/8/20 7/8/20 7/8/20 7/8/20





 16:00 16:00 16:17 17:00


 


Temp 98.9   





 98.9   


 


Pulse 114   115


 


Resp 27   27


 


B/P (MAP) 141/82 (101)   138/79 (98)


 


Pulse Ox 100  99 98


 


O2 Delivery Tracheal Collar Trach Collar Tracheal Collar Tracheal Collar


 


O2 Flow Rate 10.0 10.0 10.0 10.0


 


    





    





 7/8/20 7/8/20 7/8/20 7/8/20





 18:00 19:00 20:00 20:00


 


Temp    99.0





    99.0


 


Pulse 112 116  115


 


Resp 25 25  28


 


B/P (MAP) 139/75 (96) 140/81 (100)  150/76 (100)


 


Pulse Ox 100 99  100


 


O2 Delivery Tracheal Collar Tracheal Collar Trach Collar Tracheal Collar


 


O2 Flow Rate 10.0 10.0 10.0 10.0


 


    





    





 7/8/20 7/8/20 7/8/20 7/8/20





 20:20 20:32 21:00 22:00


 


Pulse   116 119


 


Resp   24 29


 


B/P (MAP)   146/82 (103) 158/81 (106)


 


Pulse Ox 100 100 99 99


 


O2 Delivery Tracheal Collar Tracheal Collar Tracheal Collar Tracheal Collar


 


O2 Flow Rate 10.0 10.0 10.0 10.0





 7/8/20 7/8/20 7/9/20 7/9/20





 23:00 23:50 00:00 00:01


 


Temp    98.4





    98.4


 


Pulse 118   108


 


Resp 31   24


 


B/P (MAP) 135/78 (97)   131/79 (96)


 


Pulse Ox 99 100  99


 


O2 Delivery Tracheal Collar Tracheal Collar Trach Collar Tracheal Collar


 


O2 Flow Rate 10.0 10.0 10.0 10.0


 


    





    





 7/9/20 7/9/20 7/9/20 7/9/20





 01:00 01:30 02:00 03:00


 


Pulse 107  105 102


 


Resp 19  20 21


 


B/P (MAP) 125/72 (89)  109/69 (82) 128/72 (90)


 


Pulse Ox 99 100 99 100


 


O2 Delivery Tracheal Collar Ventilator Tracheal Collar Ventilator


 


O2 Flow Rate 10.0  10.0 





 7/9/20 7/9/20 7/9/20 7/9/20





 04:00 04:00 04:24 05:00


 


Temp  98.5  





  98.5  


 


Pulse  101  102


 


Resp  20  22


 


B/P (MAP)  137/93 (108)  145/76 (99)


 


Pulse Ox  100 100 100


 


O2 Delivery Mechanical Ventilator Ventilator Ventilator Ventilator


 


    





    





 7/9/20   





 06:00   


 


Pulse 102   


 


Resp 31   


 


B/P (MAP) 136/81 (99)   


 


Pulse Ox 100   


 


O2 Delivery Ventilator   














Intake and Output   


 


 7/8/20 7/8/20 7/9/20





 15:00 23:00 07:00


 


Intake Total 250 ml 2254.2 ml 1741.1 ml


 


Output Total 520 ml 1895 ml 1215 ml


 


Balance -270 ml 359.2 ml 526.1 ml











Justicifation of Admission Dx:


Justifications for Admission:


Justification of Admission Dx:  Yes











GAETANO GONCALVES MD         Jul 9, 2020 07:52

## 2020-07-09 NOTE — PDOC
Objective:


Objective:


+BM


Vital Signs:





                                   Vital Signs








  Date Time  Temp Pulse Resp B/P (MAP) Pulse Ox O2 Delivery O2 Flow Rate FiO2


 


7/9/20 08:30     100 Tracheal Collar 10.0 


 


7/9/20 06:00  102 31 136/81 (99)    


 


7/9/20 04:00 98.5       





 98.5       








Labs:





Laboratory Tests








Test


 7/8/20


11:52 7/8/20


17:42 7/9/20


00:05 7/9/20


05:35


 


Glucose (Fingerstick) 163 mg/dL  150 mg/dL  144 mg/dL  


 


White Blood Count    13.4 x10^3/uL 


 


Red Blood Count    2.64 x10^6/uL 


 


Hemoglobin    7.9 g/dL 


 


Hematocrit    23.5 % 


 


Mean Corpuscular Volume    89 fL 


 


Mean Corpuscular Hemoglobin    30 pg 


 


Mean Corpuscular Hemoglobin


Concent 


 


 


 34 g/dL 





 


Red Cell Distribution Width    15.1 % 


 


Platelet Count    559 x10^3/uL 


 


Neutrophils (%) (Auto)    83 % 


 


Lymphocytes (%) (Auto)    8 % 


 


Monocytes (%) (Auto)    7 % 


 


Eosinophils (%) (Auto)    2 % 


 


Basophils (%) (Auto)    0 % 


 


Neutrophils # (Auto)    11.1 x10^3/uL 


 


Lymphocytes # (Auto)    1.1 x10^3/uL 


 


Monocytes # (Auto)    1.0 x10^3/uL 


 


Eosinophils # (Auto)    0.2 x10^3/uL 


 


Basophils # (Auto)    0.0 x10^3/uL 


 


Sodium Level    145 mmol/L 


 


Potassium Level    4.2 mmol/L 


 


Chloride Level    110 mmol/L 


 


Carbon Dioxide Level    32 mmol/L 


 


Anion Gap    3 


 


Blood Urea Nitrogen    14 mg/dL 


 


Creatinine    0.5 mg/dL 


 


Estimated GFR


(Cockcroft-Gault) 


 


 


 131.1 





 


Glucose Level    150 mg/dL 


 


Calcium Level    9.8 mg/dL 


 


Phosphorus Level    3.6 mg/dL 


 


Magnesium Level    2.0 mg/dL 


 


Test


 7/9/20


05:50 


 


 





 


Glucose (Fingerstick) 144 mg/dL    





  FUNGAL CULTURE,OTHER  Final  


        Final report





  JESSICA CULT RES 1  Final  


        Candida parapsilosis





PE:





GEN: resting - therapy present to slide to chair


LUNGS: trach collar


HEART: mildly tachy


ABD: G-J tube, drains, wound vac





A/P:


S/p pancreatic necrosectomy





--


Supportive care.





Justicifation of Admission Dx:


Justifications for Admission:


Justification of Admission Dx:  Yes











RAGHAVENDRA MURCIA          Jul 9, 2020 09:44

## 2020-07-09 NOTE — NUR
SS following up with discharge planning. SS reviewed pt chart and discussed with pt RN. Pt 
on trach collar. Pt on TPN, Micafungin, and Meropenem. All drains and tubes remain at this 
time. Pt has J tube and Chest tube. Pt has three ROBERT drains and two Avi drains. Pt is 
self pay pt. Per RN, they are trying to taper TPN. SS will continue to follow for discharge 
planning.

## 2020-07-10 VITALS — SYSTOLIC BLOOD PRESSURE: 142 MMHG | DIASTOLIC BLOOD PRESSURE: 83 MMHG

## 2020-07-10 VITALS — SYSTOLIC BLOOD PRESSURE: 171 MMHG | DIASTOLIC BLOOD PRESSURE: 98 MMHG

## 2020-07-10 VITALS — SYSTOLIC BLOOD PRESSURE: 165 MMHG | DIASTOLIC BLOOD PRESSURE: 92 MMHG

## 2020-07-10 VITALS — DIASTOLIC BLOOD PRESSURE: 82 MMHG | SYSTOLIC BLOOD PRESSURE: 147 MMHG

## 2020-07-10 VITALS — DIASTOLIC BLOOD PRESSURE: 103 MMHG | SYSTOLIC BLOOD PRESSURE: 165 MMHG

## 2020-07-10 VITALS — SYSTOLIC BLOOD PRESSURE: 163 MMHG | DIASTOLIC BLOOD PRESSURE: 99 MMHG

## 2020-07-10 VITALS — SYSTOLIC BLOOD PRESSURE: 167 MMHG | DIASTOLIC BLOOD PRESSURE: 106 MMHG

## 2020-07-10 VITALS — DIASTOLIC BLOOD PRESSURE: 99 MMHG | SYSTOLIC BLOOD PRESSURE: 171 MMHG

## 2020-07-10 VITALS — DIASTOLIC BLOOD PRESSURE: 78 MMHG | SYSTOLIC BLOOD PRESSURE: 144 MMHG

## 2020-07-10 VITALS — DIASTOLIC BLOOD PRESSURE: 101 MMHG | SYSTOLIC BLOOD PRESSURE: 181 MMHG

## 2020-07-10 VITALS — SYSTOLIC BLOOD PRESSURE: 148 MMHG | DIASTOLIC BLOOD PRESSURE: 88 MMHG

## 2020-07-10 VITALS — DIASTOLIC BLOOD PRESSURE: 76 MMHG | SYSTOLIC BLOOD PRESSURE: 143 MMHG

## 2020-07-10 VITALS — SYSTOLIC BLOOD PRESSURE: 147 MMHG | DIASTOLIC BLOOD PRESSURE: 51 MMHG

## 2020-07-10 VITALS — DIASTOLIC BLOOD PRESSURE: 91 MMHG | SYSTOLIC BLOOD PRESSURE: 156 MMHG

## 2020-07-10 VITALS — SYSTOLIC BLOOD PRESSURE: 172 MMHG | DIASTOLIC BLOOD PRESSURE: 102 MMHG

## 2020-07-10 VITALS — DIASTOLIC BLOOD PRESSURE: 93 MMHG | SYSTOLIC BLOOD PRESSURE: 166 MMHG

## 2020-07-10 VITALS — SYSTOLIC BLOOD PRESSURE: 171 MMHG | DIASTOLIC BLOOD PRESSURE: 100 MMHG

## 2020-07-10 VITALS — SYSTOLIC BLOOD PRESSURE: 158 MMHG | DIASTOLIC BLOOD PRESSURE: 92 MMHG

## 2020-07-10 VITALS — SYSTOLIC BLOOD PRESSURE: 159 MMHG | DIASTOLIC BLOOD PRESSURE: 96 MMHG

## 2020-07-10 VITALS — SYSTOLIC BLOOD PRESSURE: 165 MMHG | DIASTOLIC BLOOD PRESSURE: 109 MMHG

## 2020-07-10 VITALS — DIASTOLIC BLOOD PRESSURE: 79 MMHG | SYSTOLIC BLOOD PRESSURE: 148 MMHG

## 2020-07-10 LAB
ANION GAP SERPL CALC-SCNC: 4 MMOL/L (ref 6–14)
BASOPHILS # BLD AUTO: 0.1 X10^3/UL (ref 0–0.2)
BASOPHILS NFR BLD: 0 % (ref 0–3)
BUN SERPL-MCNC: 15 MG/DL (ref 7–20)
CALCIUM SERPL-MCNC: 9.9 MG/DL (ref 8.5–10.1)
CHLORIDE SERPL-SCNC: 107 MMOL/L (ref 98–107)
CO2 SERPL-SCNC: 32 MMOL/L (ref 21–32)
CREAT SERPL-MCNC: 0.5 MG/DL (ref 0.6–1)
EOSINOPHIL NFR BLD: 0.2 X10^3/UL (ref 0–0.7)
EOSINOPHIL NFR BLD: 1 % (ref 0–3)
ERYTHROCYTE [DISTWIDTH] IN BLOOD BY AUTOMATED COUNT: 15 % (ref 11.5–14.5)
GFR SERPLBLD BASED ON 1.73 SQ M-ARVRAT: 131.1 ML/MIN
GLUCOSE SERPL-MCNC: 151 MG/DL (ref 70–99)
HCT VFR BLD CALC: 25.4 % (ref 36–47)
HGB BLD-MCNC: 8.4 G/DL (ref 12–15.5)
LYMPHOCYTES # BLD: 1.2 X10^3/UL (ref 1–4.8)
LYMPHOCYTES NFR BLD AUTO: 8 % (ref 24–48)
MCH RBC QN AUTO: 29 PG (ref 25–35)
MCHC RBC AUTO-ENTMCNC: 33 G/DL (ref 31–37)
MCV RBC AUTO: 89 FL (ref 79–100)
MONO #: 1 X10^3/UL (ref 0–1.1)
MONOCYTES NFR BLD: 7 % (ref 0–9)
NEUT #: 12.9 X10^3/UL (ref 1.8–7.7)
NEUTROPHILS NFR BLD AUTO: 84 % (ref 31–73)
PLATELET # BLD AUTO: 681 X10^3/UL (ref 140–400)
POTASSIUM SERPL-SCNC: 4.1 MMOL/L (ref 3.5–5.1)
RBC # BLD AUTO: 2.86 X10^6/UL (ref 3.5–5.4)
SODIUM SERPL-SCNC: 143 MMOL/L (ref 136–145)
WBC # BLD AUTO: 15.4 X10^3/UL (ref 4–11)

## 2020-07-10 RX ADMIN — FENTANYL CITRATE PRN MCG: 50 INJECTION INTRAMUSCULAR; INTRAVENOUS at 22:00

## 2020-07-10 RX ADMIN — CYCLOBENZAPRINE HYDROCHLORIDE PRN MG: 10 TABLET, FILM COATED ORAL at 08:31

## 2020-07-10 RX ADMIN — CYCLOBENZAPRINE HYDROCHLORIDE PRN MG: 10 TABLET, FILM COATED ORAL at 19:12

## 2020-07-10 RX ADMIN — IPRATROPIUM BROMIDE AND ALBUTEROL SULFATE SCH ML: .5; 3 SOLUTION RESPIRATORY (INHALATION) at 07:27

## 2020-07-10 RX ADMIN — PANTOPRAZOLE SODIUM SCH MG: 40 INJECTION, POWDER, FOR SOLUTION INTRAVENOUS at 08:31

## 2020-07-10 RX ADMIN — INSULIN LISPRO SCH UNITS: 100 INJECTION, SOLUTION INTRAVENOUS; SUBCUTANEOUS at 12:00

## 2020-07-10 RX ADMIN — BACITRACIN SCH MLS/HR: 5000 INJECTION, POWDER, FOR SOLUTION INTRAMUSCULAR at 14:33

## 2020-07-10 RX ADMIN — FENTANYL SCH PATCH: 12.5 PATCH TRANSDERMAL at 18:03

## 2020-07-10 RX ADMIN — MEROPENEM SCH MLS/HR: 500 INJECTION, POWDER, FOR SOLUTION INTRAVENOUS at 14:09

## 2020-07-10 RX ADMIN — Medication PRN EACH: at 10:31

## 2020-07-10 RX ADMIN — ENOXAPARIN SODIUM SCH MG: 40 INJECTION SUBCUTANEOUS at 08:30

## 2020-07-10 RX ADMIN — INSULIN LISPRO SCH UNITS: 100 INJECTION, SOLUTION INTRAVENOUS; SUBCUTANEOUS at 18:00

## 2020-07-10 RX ADMIN — IPRATROPIUM BROMIDE AND ALBUTEROL SULFATE SCH ML: .5; 3 SOLUTION RESPIRATORY (INHALATION) at 20:06

## 2020-07-10 RX ADMIN — DEXTROSE SCH MLS/HR: 50 INJECTION, SOLUTION INTRAVENOUS at 11:59

## 2020-07-10 RX ADMIN — MEROPENEM SCH MLS/HR: 500 INJECTION, POWDER, FOR SOLUTION INTRAVENOUS at 07:43

## 2020-07-10 RX ADMIN — IPRATROPIUM BROMIDE AND ALBUTEROL SULFATE SCH ML: .5; 3 SOLUTION RESPIRATORY (INHALATION) at 04:00

## 2020-07-10 RX ADMIN — ONDANSETRON PRN MG: 2 INJECTION INTRAMUSCULAR; INTRAVENOUS at 18:26

## 2020-07-10 RX ADMIN — INSULIN LISPRO SCH UNITS: 100 INJECTION, SOLUTION INTRAVENOUS; SUBCUTANEOUS at 06:00

## 2020-07-10 RX ADMIN — IPRATROPIUM BROMIDE AND ALBUTEROL SULFATE SCH ML: .5; 3 SOLUTION RESPIRATORY (INHALATION) at 11:12

## 2020-07-10 RX ADMIN — ACETYLCYSTEINE SCH MG: 200 INHALANT RESPIRATORY (INHALATION) at 07:29

## 2020-07-10 RX ADMIN — ACETYLCYSTEINE SCH MG: 200 INHALANT RESPIRATORY (INHALATION) at 20:07

## 2020-07-10 RX ADMIN — IPRATROPIUM BROMIDE AND ALBUTEROL SULFATE SCH ML: .5; 3 SOLUTION RESPIRATORY (INHALATION) at 15:38

## 2020-07-10 RX ADMIN — INSULIN LISPRO SCH UNITS: 100 INJECTION, SOLUTION INTRAVENOUS; SUBCUTANEOUS at 00:00

## 2020-07-10 RX ADMIN — MEROPENEM SCH MLS/HR: 500 INJECTION, POWDER, FOR SOLUTION INTRAVENOUS at 04:57

## 2020-07-10 RX ADMIN — METOPROLOL TARTRATE PRN MG: 5 INJECTION INTRAVENOUS at 08:32

## 2020-07-10 RX ADMIN — MEROPENEM SCH MLS/HR: 500 INJECTION, POWDER, FOR SOLUTION INTRAVENOUS at 18:02

## 2020-07-10 NOTE — PDOC
Infectious Disease Note


Subjective


Subjective


More alert


Occ nausea and pain


Remains on ventilator support, FiO2 40% 5 PEEP


TPN


off vasopressin, off levophed





Vital Sign


Vital Signs





Vital Signs








  Date Time  Temp Pulse Resp B/P (MAP) Pulse Ox O2 Delivery O2 Flow Rate FiO2


 


7/10/20 06:00  109 36 181/101 (127) 100 Venturi Mask  


 


7/10/20 04:32       10.0 


 


7/10/20 04:00 99.3       





 99.3       











Physical Exam


PHYSICAL EXAM


GENERAL: Alert, NAD tired appearing but some better


HEENT: Pupils equal, oral cavity dry. + NGT


NECK:  Tracheostomy 


LUNGS: Diminished aeration bases,  CT on left 


HEART:  S1, S2, regular w/ PVCs 


ABDOMEN: Sightly less distended, bowel sounds hypoactive, soft, richardson x 2, 3 ROBERT

drains, G-J tube and + wound vac 


: [place] in place 


EXTREMITIES: Generalized edema, no cyanosis. SCDs & Podus boots bilaterally  


SKIN: warm touch. No signs of rash.  


LUE-PICC without signs of complications 


LUE art-line out, mottling left forearm about ole art-line site is improving. RP

palpable, cap refill brisk.


NEURO: alert and some responsive -  tracking





Labs


Lab





Laboratory Tests








Test


 7/9/20


12:48 7/9/20


23:04 7/10/20


05:50 7/10/20


06:07


 


Glucose (Fingerstick)


 131 mg/dL


(70-99) 138 mg/dL


(70-99) 


 103 mg/dL


(70-99)


 


White Blood Count


 


 


 15.4 x10^3/uL


(4.0-11.0) 





 


Red Blood Count


 


 


 2.86 x10^6/uL


(3.50-5.40) 





 


Hemoglobin


 


 


 8.4 g/dL


(12.0-15.5) 





 


Hematocrit


 


 


 25.4 %


(36.0-47.0) 





 


Mean Corpuscular Volume   89 fL ()  


 


Mean Corpuscular Hemoglobin   29 pg (25-35)  


 


Mean Corpuscular Hemoglobin


Concent 


 


 33 g/dL


(31-37) 





 


Red Cell Distribution Width


 


 


 15.0 %


(11.5-14.5) 





 


Platelet Count


 


 


 681 x10^3/uL


(140-400) 





 


Neutrophils (%) (Auto)   84 % (31-73)  


 


Lymphocytes (%) (Auto)   8 % (24-48)  


 


Monocytes (%) (Auto)   7 % (0-9)  


 


Eosinophils (%) (Auto)   1 % (0-3)  


 


Basophils (%) (Auto)   0 % (0-3)  


 


Neutrophils # (Auto)


 


 


 12.9 x10^3/uL


(1.8-7.7) 





 


Lymphocytes # (Auto)


 


 


 1.2 x10^3/uL


(1.0-4.8) 





 


Monocytes # (Auto)


 


 


 1.0 x10^3/uL


(0.0-1.1) 





 


Eosinophils # (Auto)


 


 


 0.2 x10^3/uL


(0.0-0.7) 





 


Basophils # (Auto)


 


 


 0.1 x10^3/uL


(0.0-0.2) 





 


Sodium Level


 


 


 143 mmol/L


(136-145) 





 


Potassium Level


 


 


 4.1 mmol/L


(3.5-5.1) 





 


Chloride Level


 


 


 107 mmol/L


() 





 


Carbon Dioxide Level


 


 


 32 mmol/L


(21-32) 





 


Anion Gap   4 (6-14)  


 


Blood Urea Nitrogen


 


 


 15 mg/dL


(7-20) 





 


Creatinine


 


 


 0.5 mg/dL


(0.6-1.0) 





 


Estimated GFR


(Cockcroft-Gault) 


 


 131.1 


 





 


Glucose Level


 


 


 151 mg/dL


(70-99) 





 


Calcium Level


 


 


 9.9 mg/dL


(8.5-10.1) 











Micro


6/7 





GRAM STAIN  Final  


        Final





        GRAM NEGATIVE RODS:MODERATE


        SQUAMOUS EPI CELL:NOT APPLICABLE


        PMN (WBCs):RARE


        YEAST:MODERATE


        Unless otherwise specified, Testing Performed by:


        [address] [address]


        For Inquires, the Physician may contact the Microbiology


        department at [phone]





  ANAEROBIC-AEROBIC CULTURE  Preliminary  


        Preliminary





        MANY GRAM NEGATIVE RODS on 06/09/20 at 2980


        FINAL ID= [PSEUDOMONAS AERUGINOSA]


        PSEUDOMONAS AERUGINOSA





  ANTIMICROBIAL SUSCEPTIBILITY  Preliminary  


        Comment





        NEG ANSON 56


        PSEUDOMONAS AERUGINOSA


        ANTIBIOTIC                        RESULT          INTERPRETATION


        AMIKACIN                          <=16                  S


        AZTREONAM                         >16                   R


        CEFTAZIDIME                       >16                   R


        CIPROFLOXACIN                     <=0.25                S


        CEFEPIME                          16                    I


        CEFTAZIDIME/AVIBACTAM             <=4                   S


        GENTAMICIN                        <=2                   S














                               ** CONTINUED ON NEXT PAGE **





 

--------------------------------------------------------------------------------


------------





RUN DATE: 06/11/20                  [facility] *LIVE*               

  PAGE 2   


RUN TIME: 1016                            Specimen Inquiry                    


 

--------------------------------------------------------------------------------


------------





SPEC: [id number]    PATIENT: JESENIA BEAN KEYON                [id number]  

(Continued)


------------------------------------------------------------

--------------------------------








 

--------------------------------------------------------------------------------


------------





  Procedure                         Result                                      

         


------------------------------------

--------------------------------------------------------





  ANTIMICROBIAL SUSCEPTIBILITY  Preliminary   (continued)


        LEVOFLOXACIN                      <=0.5                 S


        MEROPENEM                         <=1                   S


        PIPERACILLIN/TAZOBACTAM           64                    S


        TOBRAMYCIN                        <=2                   S


        Unless otherwise specified, Testing Performed by:


        [facility]


        [address] [place] [address]


        For Inquires, the Physician may contact the Microbiology


        department at [phone]











CT Scan 6/6





IMPRESSION:


1. Removal of the percutaneous pigtail drainage catheters since the prior 


exam. Sequela of pancreatitis with extensive pseudocysts again 


demonstrated, the right-sided collections are slightly larger since the 


prior exam, the left-sided collections are stable. See above.


2. Moderate to large left pleural effusion with atelectasis and collapse 


of most of the left lower lobe, stable. Small right pleural effusion is 


stable.


3. Gallstone.





Objective


Assessment


Patient with prolonged hospitalization more than 3 months


Multiple medical problems


Multiple surgical procedures





Loose stool - occ stooling expected as part of normal bowel function but c-diff 

sent


S/P Exp. Lap, REN, subtotal cholecystectomy with cholangiogram, G-J tube 

placement & pancreatic necrosectomy on June 30 YEAST parapsilosis/PSA 


Leukocytosis - mild increase today but all parameters are up


Fever intermittently source likely GI


Severe protein malnutrition


Acute gallstone pancreatitis with persistent necrosis


  -CT a/p 4/9.  Increased ascites. Persistent evidence of necrotizing 

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


           - 4/27. status post ROBERT drain placement; C. parapsilosis. s/p drain 

5/6 + yeast & high amylase; s/p additional drain on 5/8. Drains removed. 


           -5/6. fluid  candida parapsilosis fluid, amylase high


           - 6/6 showed multiple pseudocysts, slight larger on the right. s/p 

drains x 3, 6/7.  + PSAE (MDRO-R Cefepime, Zosyn ANSON < 64) and yeast, 


           -6/7 s/p drain replacement x 3; fluid cult PSAE (MDRO), yeast; 

treated


Ascites s/p paracentesis 4/15 & 5/6. C. parapsilosis 


Cholelithiasis with thickening of the gallbladder wall.


JED, Hyperkalemia, Metabolic acidosis off dialysis


Acute hypoxic resp failure. trach/vent. sputum 6/13  + PSAE (I merrem) 


Pleural effusions s/p left thoracentesis, 5/12. no culture. s/p left chest tube,

6/15 no growth


Hypocalcemia 


Prediabetes


HTN


Anemia s/p RBCs





Plan


Plan of Care





F/u C-diff


PSA from 6/30 I to Meropenem but WBC improving so will try to hold changing abx 

to Cipro and or Avycaz to try and save potential abx options


Monitor Abd distension ? mild improvement today ? with BM


continue merrem and micafungin but


d/c'd Dapto 7/7


Monitor labs in am


wound care /drain management as directed











Contact isolation for CRE/MDRO


Critically ill





D/w nursing











RASHAWN ROSEN MD              Jul 10, 2020 07:34

## 2020-07-10 NOTE — PDOC
Objective:


Objective:


D/w nurse - checking C Diff, tolerating tube feeds.


Vital Signs:





                                   Vital Signs








  Date Time  Temp Pulse Resp B/P (MAP) Pulse Ox O2 Delivery O2 Flow Rate FiO2


 


7/10/20 08:32  108  172/101    


 


7/10/20 07:29     98 Tracheal Collar 10.0 


 


7/10/20 06:00   36     


 


7/10/20 04:00 99.3       





 99.3       








Labs:





Laboratory Tests








Test


 7/9/20


12:48 7/9/20


23:04 7/10/20


05:50 7/10/20


06:07


 


Glucose (Fingerstick) 131 mg/dL  138 mg/dL   103 mg/dL 


 


White Blood Count   15.4 x10^3/uL  


 


Red Blood Count   2.86 x10^6/uL  


 


Hemoglobin   8.4 g/dL  


 


Hematocrit   25.4 %  


 


Mean Corpuscular Volume   89 fL  


 


Mean Corpuscular Hemoglobin   29 pg  


 


Mean Corpuscular Hemoglobin


Concent 


 


 33 g/dL 


 





 


Red Cell Distribution Width   15.0 %  


 


Platelet Count   681 x10^3/uL  


 


Neutrophils (%) (Auto)   84 %  


 


Lymphocytes (%) (Auto)   8 %  


 


Monocytes (%) (Auto)   7 %  


 


Eosinophils (%) (Auto)   1 %  


 


Basophils (%) (Auto)   0 %  


 


Neutrophils # (Auto)   12.9 x10^3/uL  


 


Lymphocytes # (Auto)   1.2 x10^3/uL  


 


Monocytes # (Auto)   1.0 x10^3/uL  


 


Eosinophils # (Auto)   0.2 x10^3/uL  


 


Basophils # (Auto)   0.1 x10^3/uL  


 


Sodium Level   143 mmol/L  


 


Potassium Level   4.1 mmol/L  


 


Chloride Level   107 mmol/L  


 


Carbon Dioxide Level   32 mmol/L  


 


Anion Gap   4  


 


Blood Urea Nitrogen   15 mg/dL  


 


Creatinine   0.5 mg/dL  


 


Estimated GFR


(Cockcroft-Gault) 


 


 131.1 


 





 


Glucose Level   151 mg/dL  


 


Calcium Level   9.9 mg/dL  











PE:





GEN: therapy present, going to try to stand up, many tubes





A/P:


S/p pancreatic necrosectomy





--


Continue support.





Justicifation of Admission Dx:


Justifications for Admission:


Justification of Admission Dx:  Yes











RAGHAVENDRA MURCIA         Jul 10, 2020 10:10

## 2020-07-10 NOTE — PDOC
SURGICAL PROGRESS NOTE


Subjective


Patient no longer sedated little communication


Vital Signs





Vital Signs








  Date Time  Temp Pulse Resp B/P (MAP) Pulse Ox O2 Delivery O2 Flow Rate FiO2


 


7/10/20 08:32  108  172/101    


 


7/10/20 07:29     98 Tracheal Collar 10.0 


 


7/10/20 06:00   36     


 


7/10/20 04:00 99.3       





 99.3       








I&O











Intake and Output 


 


 7/10/20





 07:00


 


Intake Total 1422 ml


 


Output Total 2275 ml


 


Balance -853 ml


 


 


 


IV Total 1122 ml


 


Tube Feeding 300 ml


 


Other 0 ml


 


Output Urine Total 1335 ml


 


Chest Tube Drainage Total 110 ml


 


Drainage Total 830 ml








PATIENT HAS A VILLASENOR:  Yes


General:  Cooperative, mild distress


Abdomen:  Normal bowel sounds, Soft, No tenderness, Other (Multiple drains with 

output no significant changes)


Labs





Laboratory Tests








Test


 7/8/20


11:52 7/8/20


17:42 7/9/20


00:05 7/9/20


05:35


 


Glucose (Fingerstick)


 163 mg/dL


(70-99) 150 mg/dL


(70-99) 144 mg/dL


(70-99) 





 


White Blood Count


 


 


 


 13.4 x10^3/uL


(4.0-11.0)


 


Red Blood Count


 


 


 


 2.64 x10^6/uL


(3.50-5.40)


 


Hemoglobin


 


 


 


 7.9 g/dL


(12.0-15.5)


 


Hematocrit


 


 


 


 23.5 %


(36.0-47.0)


 


Mean Corpuscular Volume    89 fL () 


 


Mean Corpuscular Hemoglobin    30 pg (25-35) 


 


Mean Corpuscular Hemoglobin


Concent 


 


 


 34 g/dL


(31-37)


 


Red Cell Distribution Width


 


 


 


 15.1 %


(11.5-14.5)


 


Platelet Count


 


 


 


 559 x10^3/uL


(140-400)


 


Neutrophils (%) (Auto)    83 % (31-73) 


 


Lymphocytes (%) (Auto)    8 % (24-48) 


 


Monocytes (%) (Auto)    7 % (0-9) 


 


Eosinophils (%) (Auto)    2 % (0-3) 


 


Basophils (%) (Auto)    0 % (0-3) 


 


Neutrophils # (Auto)


 


 


 


 11.1 x10^3/uL


(1.8-7.7)


 


Lymphocytes # (Auto)


 


 


 


 1.1 x10^3/uL


(1.0-4.8)


 


Monocytes # (Auto)


 


 


 


 1.0 x10^3/uL


(0.0-1.1)


 


Eosinophils # (Auto)


 


 


 


 0.2 x10^3/uL


(0.0-0.7)


 


Basophils # (Auto)


 


 


 


 0.0 x10^3/uL


(0.0-0.2)


 


Sodium Level


 


 


 


 145 mmol/L


(136-145)


 


Potassium Level


 


 


 


 4.2 mmol/L


(3.5-5.1)


 


Chloride Level


 


 


 


 110 mmol/L


()


 


Carbon Dioxide Level


 


 


 


 32 mmol/L


(21-32)


 


Anion Gap    3 (6-14) 


 


Blood Urea Nitrogen


 


 


 


 14 mg/dL


(7-20)


 


Creatinine


 


 


 


 0.5 mg/dL


(0.6-1.0)


 


Estimated GFR


(Cockcroft-Gault) 


 


 


 131.1 





 


Glucose Level


 


 


 


 150 mg/dL


(70-99)


 


Calcium Level


 


 


 


 9.8 mg/dL


(8.5-10.1)


 


Phosphorus Level


 


 


 


 3.6 mg/dL


(2.6-4.7)


 


Magnesium Level


 


 


 


 2.0 mg/dL


(1.8-2.4)


 


Test


 7/9/20


05:50 7/9/20


12:48 7/9/20


23:04 7/10/20


05:50


 


Glucose (Fingerstick)


 144 mg/dL


(70-99) 131 mg/dL


(70-99) 138 mg/dL


(70-99) 





 


White Blood Count


 


 


 


 15.4 x10^3/uL


(4.0-11.0)


 


Red Blood Count


 


 


 


 2.86 x10^6/uL


(3.50-5.40)


 


Hemoglobin


 


 


 


 8.4 g/dL


(12.0-15.5)


 


Hematocrit


 


 


 


 25.4 %


(36.0-47.0)


 


Mean Corpuscular Volume    89 fL () 


 


Mean Corpuscular Hemoglobin    29 pg (25-35) 


 


Mean Corpuscular Hemoglobin


Concent 


 


 


 33 g/dL


(31-37)


 


Red Cell Distribution Width


 


 


 


 15.0 %


(11.5-14.5)


 


Platelet Count


 


 


 


 681 x10^3/uL


(140-400)


 


Neutrophils (%) (Auto)    84 % (31-73) 


 


Lymphocytes (%) (Auto)    8 % (24-48) 


 


Monocytes (%) (Auto)    7 % (0-9) 


 


Eosinophils (%) (Auto)    1 % (0-3) 


 


Basophils (%) (Auto)    0 % (0-3) 


 


Neutrophils # (Auto)


 


 


 


 12.9 x10^3/uL


(1.8-7.7)


 


Lymphocytes # (Auto)


 


 


 


 1.2 x10^3/uL


(1.0-4.8)


 


Monocytes # (Auto)


 


 


 


 1.0 x10^3/uL


(0.0-1.1)


 


Eosinophils # (Auto)


 


 


 


 0.2 x10^3/uL


(0.0-0.7)


 


Basophils # (Auto)


 


 


 


 0.1 x10^3/uL


(0.0-0.2)


 


Sodium Level


 


 


 


 143 mmol/L


(136-145)


 


Potassium Level


 


 


 


 4.1 mmol/L


(3.5-5.1)


 


Chloride Level


 


 


 


 107 mmol/L


()


 


Carbon Dioxide Level


 


 


 


 32 mmol/L


(21-32)


 


Anion Gap    4 (6-14) 


 


Blood Urea Nitrogen


 


 


 


 15 mg/dL


(7-20)


 


Creatinine


 


 


 


 0.5 mg/dL


(0.6-1.0)


 


Estimated GFR


(Cockcroft-Gault) 


 


 


 131.1 





 


Glucose Level


 


 


 


 151 mg/dL


(70-99)


 


Calcium Level


 


 


 


 9.9 mg/dL


(8.5-10.1)


 


Test


 7/10/20


06:07 


 


 





 


Glucose (Fingerstick)


 103 mg/dL


(70-99) 


 


 











Laboratory Tests








Test


 7/9/20


12:48 7/9/20


23:04 7/10/20


05:50 7/10/20


06:07


 


Glucose (Fingerstick)


 131 mg/dL


(70-99) 138 mg/dL


(70-99) 


 103 mg/dL


(70-99)


 


White Blood Count


 


 


 15.4 x10^3/uL


(4.0-11.0) 





 


Red Blood Count


 


 


 2.86 x10^6/uL


(3.50-5.40) 





 


Hemoglobin


 


 


 8.4 g/dL


(12.0-15.5) 





 


Hematocrit


 


 


 25.4 %


(36.0-47.0) 





 


Mean Corpuscular Volume   89 fL ()  


 


Mean Corpuscular Hemoglobin   29 pg (25-35)  


 


Mean Corpuscular Hemoglobin


Concent 


 


 33 g/dL


(31-37) 





 


Red Cell Distribution Width


 


 


 15.0 %


(11.5-14.5) 





 


Platelet Count


 


 


 681 x10^3/uL


(140-400) 





 


Neutrophils (%) (Auto)   84 % (31-73)  


 


Lymphocytes (%) (Auto)   8 % (24-48)  


 


Monocytes (%) (Auto)   7 % (0-9)  


 


Eosinophils (%) (Auto)   1 % (0-3)  


 


Basophils (%) (Auto)   0 % (0-3)  


 


Neutrophils # (Auto)


 


 


 12.9 x10^3/uL


(1.8-7.7) 





 


Lymphocytes # (Auto)


 


 


 1.2 x10^3/uL


(1.0-4.8) 





 


Monocytes # (Auto)


 


 


 1.0 x10^3/uL


(0.0-1.1) 





 


Eosinophils # (Auto)


 


 


 0.2 x10^3/uL


(0.0-0.7) 





 


Basophils # (Auto)


 


 


 0.1 x10^3/uL


(0.0-0.2) 





 


Sodium Level


 


 


 143 mmol/L


(136-145) 





 


Potassium Level


 


 


 4.1 mmol/L


(3.5-5.1) 





 


Chloride Level


 


 


 107 mmol/L


() 





 


Carbon Dioxide Level


 


 


 32 mmol/L


(21-32) 





 


Anion Gap   4 (6-14)  


 


Blood Urea Nitrogen


 


 


 15 mg/dL


(7-20) 





 


Creatinine


 


 


 0.5 mg/dL


(0.6-1.0) 





 


Estimated GFR


(Cockcroft-Gault) 


 


 131.1 


 





 


Glucose Level


 


 


 151 mg/dL


(70-99) 





 


Calcium Level


 


 


 9.9 mg/dL


(8.5-10.1) 











Problem List


Problems


Medical Problems:


(1) Acute pancreatitis


Status: Acute  





(2) Cholelithiasis


Status: Acute  








Assessment/Plan


Status post pancreatic debridement continue drains


Tube feeds at 30 cc an hour


Continue supportive care





Justicifation of Admission Dx:


Justifications for Admission:


Justification of Admission Dx:  Yes











OVIDIO MEDINA MD             Jul 10, 2020 09:21

## 2020-07-10 NOTE — NUR
Pharmacy TPN Dosing Note



S: JESENIA BEAN is a 49 year old F Currently receiving Central Continuous TPN started 
03/18/20



B:Pertinent PMH: Necrotizing pancreatitis

Height: 5 feet, 8 inches

Weight: 94.338904 kg



Current diet: NPO 



LABS:

Sodium:    143 

Potassium: 4.1 

Chloride:  107 

Calcium:   9.9 

Corrected Calcium: 12.22 

Magnesium: 2 

CO2:       32 

SCr:       0.5 

Glucose:   103-151 

Albumin:   1.1 

AST:       25 

ALT:       37 



TPN FORMULA:

TPN TYPE:  Central Continuous

AMINO ACIDS:         40 gm

DEXTROSE:            225 gm

LIPIDS:              20 gm

SODIUM CHLORIDE:     90 mEq

SODIUM ACETATE:      - mEq

SODIUM PHOSPHATE:    - mmol

POTASSIUM CHLORIDE:  30 mEq

POTASSIUM ACETATE:   30 mEq

POTASSIUM PHOSPHATE: - mmol

MAGNESIUM:           15 mEq

CALCIUM:             - mEq

INSULIN:             15 units

MULTIPLE VITAMIN:    10 ml

TRACE ELEMENTS:      1 ml ml(s)



TPN PLAN:  

-Continue macros per dietary recommendations

-Sodium and chloride trending down. Bicarb remains the same. Patient

tolerated TF overnight at rate of 30 ml/hr. Plan to increase TF rate

throughout the day today. Noted patient had watery BM.

C. Diff pending.

-No changes to be made to TPN at this time.

-BMP in AM.





R: Continue current TPN. 

Will monitor electrolytes, glucose, and tolerance to TPN.



 BRANDI CORDOBA [facility], 07/10/20 [id number]

## 2020-07-10 NOTE — NUR
Liquid stool. Sample sent to lab for possible c-diff.

Patient more awake and states she wants to see her "friends", and stated [place]. Easily 
reoriented. Reports feeling "restless" and requested pain medicine. Advised of continuous 
fentanyl drip. 



Requesting her phone, unable to locate. Chargers noted and placed in plastic bag. Nothing 
found in belongings bag.

## 2020-07-10 NOTE — PDOC
PROGRESS NOTES


Chief Complaint


Chief Complaint


A/P


Acute hypoxic Respiratory failure required  mechanical ventilation


Tracheostomy


bilateral pleural effusions/pulm edema s/p Throacentesis on 6/15/2020


Severe Acute gallstone pancreatitis (not a surgical candidate at this time) with

necrosis


Acute kidney failure now requiring dialysis


Gallstones (Calculus of gallbladder with acute cholecystitis without 

obstruction)


HTN 


Intractable pain


Intractable nausea


Covid 19 negative. 


Acute on chronic anemia 


EEG: No seizure activityFever  - better currently - intermittent could be from 

underlying pancreatitis blood cults 5/4 - neg so far


? Ileus with vomiting


Abd distention - U/S and CT reviewed s/p 0.4 L of opaque, debris-containing 

ascites was removed 5/6


Acute pancreatitis with persistent necrosis


Gallstone pancreatitis with necrosis. 


   -CT A/P 6/6 showed multiple pseudocysts, slight larger on the right. s/p 

drains x 3, 6/7.  + PSAE (MDRO-R Cefepime, Zosyn ANSON < 64) and yeast, 


   -s/p drain 4/27. C. parapsilosis. s/p drain 5/6 + yeast & high amylase; s/p 

additional drain on 5/8. Drains removed. 


Ascites s/p paracentesis 4/15 & 5/6. C. parapsilosis 


JED. off HD. 


A large fluid collection in the pancreatic bed has slightly decreased in size, 

described below, the pancreas itself is difficult to  visualize, which could be 

due to necrosis or obscuration of pancreatic  parenchyma from the surrounding 

fluid collection.6/15 


- 4/27 status post ROBERT drain placement + C paropsilosis. s/p additional drains 

5/8


Anemia - S/p PRBCs


Cholelithiasis with thickening of the gallbladder wall.


Leucocytosis improving


JED, hyperkalemia, Metabolic acidosis off dialysis


hypocalcemia 


Prediabetes


HTN


s/p trach


ESRD on HD


Hyperglycemia


severe protein-caloric malnutrition


Moderate to large left pleural effusion with atelectasis and collapse  of most 

of the left lower lobe, stable


 


Dispo - ICU, critically ill


Poor prognosis





History of Present Illness


History of Present Illness


6/8: IR placed drain on 6/7. 4u PRBC after Hb drop. Hb 8.8 today. Off Levophed 

this morning. T-max 100.3. Much more lethargic today. CXR with left sided 

diffuse infiltrates.


6/9: Tachycardic overnight into the 140s. NGT clamped. On BIPAP currently. 

Drains with serosanguinous discharge. WBC 8, Tmax 99.6F.


6/10: Seen on trach shield in ICU.  Hypertensive and tachycardic. Labs stable. 

blood stained drainage from drains. Afebrile.


6/11: Seen on trach shield in ICU. She is a bit confused, drowsy, but when 

sitting up is conversational and confusion somewhat clears. She is asking for 

more pain medication. Stable drains, still very tachy.Na 147


6/12: Patient vomited overnight. Aspirated. Tried to pull her trach out, she was

told she would die without her trach, she said "I know, I just want to go home".

Hb 7.6. Afebrile, still very tachycardic. 1055ml out of right sided ROBERT drain


6/13: Overnight hypoxic, on BIPAP. CXR with left sided white out lung. 

Significant mucous plug suctioned by RT with improvement in her ABG after 2 

hours this morning. Not really active, tired, lethargic. 890ml out of drains 

past 24 hours. On vent. D/w daughter bedside.


6/29: To OR for pancreatic necrosectomy, cholecystectomy, lysis of adhesions, 

gastrostomy tube placement





7/7,  awake on vent, weaning sedation, cont other, still with marked drainage


7/8: Still on fentanyl. WBC 15.4, Hb 8.7 Metabolic panel WNL except calcium 10.2

with albumin 1.1. She awakens off sedation, still connected to vent currently.


7/9: FiO2 40% 5 PEEP, WBC 13.4, Hb 7.9, platelets 551.  Still on fentanyl, she 

is working with PT.





Off vent during the day. Working with PT. Labs stable. Hb to 8.5. Working with 

PT/OT. She has been suctioning quite a bit of respiratory secretions as well as 

loose bowels. C diff pending.





prognosis still poor, but improving slowly





Vitals


Vitals





Vital Signs








  Date Time  Temp Pulse Resp B/P (MAP) Pulse Ox O2 Delivery O2 Flow Rate FiO2


 


7/10/20 08:32  108  172/101    


 


7/10/20 07:29     98 Tracheal Collar 10.0 


 


7/10/20 06:00   36     


 


7/10/20 04:00 99.3       





 99.3       











Physical Exam


Physical Exam


GENERAL: Alert, NAD tired appearing but some better


HEENT: Pupils equal, oral cavity dry. + NGT


NECK:  Tracheostomy 


LUNGS: Diminished aeration bases,  CT on left 


HEART:  S1, S2, regular w/ PVCs 


ABDOMEN: Sightly less distended, bowel sounds hypoactive, soft, richardson x 2, 3 ROBERT

drains, G-J tube and + wound vac 


: [place] in place 


EXTREMITIES: Generalized edema, no cyanosis. SCDs & Podus boots bilaterally  


SKIN: warm touch. No signs of rash.  


LUE-PICC without signs of complications 


LUE art-line out, mottling left forearm about ole art-line site is improving. RP

palpable, cap refill brisk.


NEURO: alert and some responsive -  tracking


General:  Cooperative, No acute distress


Heart:  Regular rate (SR/ST), Other (distant heart sounds)


Lungs:  Crackles


Abdomen:  Soft, Other (multiple drains )


Extremities:  Other (Diffuse edema)


Skin:  No rashes, No significant lesion





Labs


LABS





Laboratory Tests








Test


 7/9/20


12:48 7/9/20


23:04 7/10/20


05:50 7/10/20


06:07


 


Glucose (Fingerstick)


 131 mg/dL


(70-99) 138 mg/dL


(70-99) 


 103 mg/dL


(70-99)


 


White Blood Count


 


 


 15.4 x10^3/uL


(4.0-11.0) 





 


Red Blood Count


 


 


 2.86 x10^6/uL


(3.50-5.40) 





 


Hemoglobin


 


 


 8.4 g/dL


(12.0-15.5) 





 


Hematocrit


 


 


 25.4 %


(36.0-47.0) 





 


Mean Corpuscular Volume   89 fL ()  


 


Mean Corpuscular Hemoglobin   29 pg (25-35)  


 


Mean Corpuscular Hemoglobin


Concent 


 


 33 g/dL


(31-37) 





 


Red Cell Distribution Width


 


 


 15.0 %


(11.5-14.5) 





 


Platelet Count


 


 


 681 x10^3/uL


(140-400) 





 


Neutrophils (%) (Auto)   84 % (31-73)  


 


Lymphocytes (%) (Auto)   8 % (24-48)  


 


Monocytes (%) (Auto)   7 % (0-9)  


 


Eosinophils (%) (Auto)   1 % (0-3)  


 


Basophils (%) (Auto)   0 % (0-3)  


 


Neutrophils # (Auto)


 


 


 12.9 x10^3/uL


(1.8-7.7) 





 


Lymphocytes # (Auto)


 


 


 1.2 x10^3/uL


(1.0-4.8) 





 


Monocytes # (Auto)


 


 


 1.0 x10^3/uL


(0.0-1.1) 





 


Eosinophils # (Auto)


 


 


 0.2 x10^3/uL


(0.0-0.7) 





 


Basophils # (Auto)


 


 


 0.1 x10^3/uL


(0.0-0.2) 





 


Sodium Level


 


 


 143 mmol/L


(136-145) 





 


Potassium Level


 


 


 4.1 mmol/L


(3.5-5.1) 





 


Chloride Level


 


 


 107 mmol/L


() 





 


Carbon Dioxide Level


 


 


 32 mmol/L


(21-32) 





 


Anion Gap   4 (6-14)  


 


Blood Urea Nitrogen


 


 


 15 mg/dL


(7-20) 





 


Creatinine


 


 


 0.5 mg/dL


(0.6-1.0) 





 


Estimated GFR


(Cockcroft-Gault) 


 


 131.1 


 





 


Glucose Level


 


 


 151 mg/dL


(70-99) 





 


Calcium Level


 


 


 9.9 mg/dL


(8.5-10.1) 














Assessment and Plan


Assessmemt and Plan


Problems


Medical Problems:


(1) Acute pancreatitis


Status: Acute  





(2) Cholelithiasis


Status: Acute  











Comment


Review of Relevant


I have reviewed the following items josy (where applicable) has been applied.


Labs





Laboratory Tests








Test


 7/8/20


11:52 7/8/20


17:42 7/9/20


00:05 7/9/20


05:35


 


Glucose (Fingerstick)


 163 mg/dL


(70-99) 150 mg/dL


(70-99) 144 mg/dL


(70-99) 





 


White Blood Count


 


 


 


 13.4 x10^3/uL


(4.0-11.0)


 


Red Blood Count


 


 


 


 2.64 x10^6/uL


(3.50-5.40)


 


Hemoglobin


 


 


 


 7.9 g/dL


(12.0-15.5)


 


Hematocrit


 


 


 


 23.5 %


(36.0-47.0)


 


Mean Corpuscular Volume    89 fL () 


 


Mean Corpuscular Hemoglobin    30 pg (25-35) 


 


Mean Corpuscular Hemoglobin


Concent 


 


 


 34 g/dL


(31-37)


 


Red Cell Distribution Width


 


 


 


 15.1 %


(11.5-14.5)


 


Platelet Count


 


 


 


 559 x10^3/uL


(140-400)


 


Neutrophils (%) (Auto)    83 % (31-73) 


 


Lymphocytes (%) (Auto)    8 % (24-48) 


 


Monocytes (%) (Auto)    7 % (0-9) 


 


Eosinophils (%) (Auto)    2 % (0-3) 


 


Basophils (%) (Auto)    0 % (0-3) 


 


Neutrophils # (Auto)


 


 


 


 11.1 x10^3/uL


(1.8-7.7)


 


Lymphocytes # (Auto)


 


 


 


 1.1 x10^3/uL


(1.0-4.8)


 


Monocytes # (Auto)


 


 


 


 1.0 x10^3/uL


(0.0-1.1)


 


Eosinophils # (Auto)


 


 


 


 0.2 x10^3/uL


(0.0-0.7)


 


Basophils # (Auto)


 


 


 


 0.0 x10^3/uL


(0.0-0.2)


 


Sodium Level


 


 


 


 145 mmol/L


(136-145)


 


Potassium Level


 


 


 


 4.2 mmol/L


(3.5-5.1)


 


Chloride Level


 


 


 


 110 mmol/L


()


 


Carbon Dioxide Level


 


 


 


 32 mmol/L


(21-32)


 


Anion Gap    3 (6-14) 


 


Blood Urea Nitrogen


 


 


 


 14 mg/dL


(7-20)


 


Creatinine


 


 


 


 0.5 mg/dL


(0.6-1.0)


 


Estimated GFR


(Cockcroft-Gault) 


 


 


 131.1 





 


Glucose Level


 


 


 


 150 mg/dL


(70-99)


 


Calcium Level


 


 


 


 9.8 mg/dL


(8.5-10.1)


 


Phosphorus Level


 


 


 


 3.6 mg/dL


(2.6-4.7)


 


Magnesium Level


 


 


 


 2.0 mg/dL


(1.8-2.4)


 


Test


 7/9/20


05:50 7/9/20


12:48 7/9/20


23:04 7/10/20


05:50


 


Glucose (Fingerstick)


 144 mg/dL


(70-99) 131 mg/dL


(70-99) 138 mg/dL


(70-99) 





 


White Blood Count


 


 


 


 15.4 x10^3/uL


(4.0-11.0)


 


Red Blood Count


 


 


 


 2.86 x10^6/uL


(3.50-5.40)


 


Hemoglobin


 


 


 


 8.4 g/dL


(12.0-15.5)


 


Hematocrit


 


 


 


 25.4 %


(36.0-47.0)


 


Mean Corpuscular Volume    89 fL () 


 


Mean Corpuscular Hemoglobin    29 pg (25-35) 


 


Mean Corpuscular Hemoglobin


Concent 


 


 


 33 g/dL


(31-37)


 


Red Cell Distribution Width


 


 


 


 15.0 %


(11.5-14.5)


 


Platelet Count


 


 


 


 681 x10^3/uL


(140-400)


 


Neutrophils (%) (Auto)    84 % (31-73) 


 


Lymphocytes (%) (Auto)    8 % (24-48) 


 


Monocytes (%) (Auto)    7 % (0-9) 


 


Eosinophils (%) (Auto)    1 % (0-3) 


 


Basophils (%) (Auto)    0 % (0-3) 


 


Neutrophils # (Auto)


 


 


 


 12.9 x10^3/uL


(1.8-7.7)


 


Lymphocytes # (Auto)


 


 


 


 1.2 x10^3/uL


(1.0-4.8)


 


Monocytes # (Auto)


 


 


 


 1.0 x10^3/uL


(0.0-1.1)


 


Eosinophils # (Auto)


 


 


 


 0.2 x10^3/uL


(0.0-0.7)


 


Basophils # (Auto)


 


 


 


 0.1 x10^3/uL


(0.0-0.2)


 


Sodium Level


 


 


 


 143 mmol/L


(136-145)


 


Potassium Level


 


 


 


 4.1 mmol/L


(3.5-5.1)


 


Chloride Level


 


 


 


 107 mmol/L


()


 


Carbon Dioxide Level


 


 


 


 32 mmol/L


(21-32)


 


Anion Gap    4 (6-14) 


 


Blood Urea Nitrogen


 


 


 


 15 mg/dL


(7-20)


 


Creatinine


 


 


 


 0.5 mg/dL


(0.6-1.0)


 


Estimated GFR


(Cockcroft-Gault) 


 


 


 131.1 





 


Glucose Level


 


 


 


 151 mg/dL


(70-99)


 


Calcium Level


 


 


 


 9.9 mg/dL


(8.5-10.1)


 


Test


 7/10/20


06:07 


 


 





 


Glucose (Fingerstick)


 103 mg/dL


(70-99) 


 


 











Laboratory Tests








Test


 7/9/20


12:48 7/9/20


23:04 7/10/20


05:50 7/10/20


06:07


 


Glucose (Fingerstick)


 131 mg/dL


(70-99) 138 mg/dL


(70-99) 


 103 mg/dL


(70-99)


 


White Blood Count


 


 


 15.4 x10^3/uL


(4.0-11.0) 





 


Red Blood Count


 


 


 2.86 x10^6/uL


(3.50-5.40) 





 


Hemoglobin


 


 


 8.4 g/dL


(12.0-15.5) 





 


Hematocrit


 


 


 25.4 %


(36.0-47.0) 





 


Mean Corpuscular Volume   89 fL ()  


 


Mean Corpuscular Hemoglobin   29 pg (25-35)  


 


Mean Corpuscular Hemoglobin


Concent 


 


 33 g/dL


(31-37) 





 


Red Cell Distribution Width


 


 


 15.0 %


(11.5-14.5) 





 


Platelet Count


 


 


 681 x10^3/uL


(140-400) 





 


Neutrophils (%) (Auto)   84 % (31-73)  


 


Lymphocytes (%) (Auto)   8 % (24-48)  


 


Monocytes (%) (Auto)   7 % (0-9)  


 


Eosinophils (%) (Auto)   1 % (0-3)  


 


Basophils (%) (Auto)   0 % (0-3)  


 


Neutrophils # (Auto)


 


 


 12.9 x10^3/uL


(1.8-7.7) 





 


Lymphocytes # (Auto)


 


 


 1.2 x10^3/uL


(1.0-4.8) 





 


Monocytes # (Auto)


 


 


 1.0 x10^3/uL


(0.0-1.1) 





 


Eosinophils # (Auto)


 


 


 0.2 x10^3/uL


(0.0-0.7) 





 


Basophils # (Auto)


 


 


 0.1 x10^3/uL


(0.0-0.2) 





 


Sodium Level


 


 


 143 mmol/L


(136-145) 





 


Potassium Level


 


 


 4.1 mmol/L


(3.5-5.1) 





 


Chloride Level


 


 


 107 mmol/L


() 





 


Carbon Dioxide Level


 


 


 32 mmol/L


(21-32) 





 


Anion Gap   4 (6-14)  


 


Blood Urea Nitrogen


 


 


 15 mg/dL


(7-20) 





 


Creatinine


 


 


 0.5 mg/dL


(0.6-1.0) 





 


Estimated GFR


(Cockcroft-Gault) 


 


 131.1 


 





 


Glucose Level


 


 


 151 mg/dL


(70-99) 





 


Calcium Level


 


 


 9.9 mg/dL


(8.5-10.1) 











Microbiology


6/30/20 Gram Stain - Final, Complete


          


6/30/20 Aerobic and Anaerobic Culture - Final, Complete


          


6/30/20 Antimicrobic Susceptibility - Final, Complete


          


6/28/20 Blood Culture - Final, Complete


          NO GROWTH AFTER 5 DAYS


6/15/20 Gram Stain - Final, Complete


          


6/15/20 Aerobic and Anaerobic Culture - Final, Complete


          


6/13/20 Gram Stain Evaluation - Final, Complete


          


6/13/20 Respiratory Culture - Final, Complete


          


6/13/20 Antimicrobic Susceptibility - Final, Complete


          


6/7/20 Urine Culture - Final, Complete


         


5/30/20 Gram Stain - Final, Complete


          


5/30/20 Aerobic Culture - Final, Complete


Medications





Current Medications


Sodium Chloride 1,000 ml @  1,000 mls/hr Q1H IV  Last administered on 3/16/20at 

03:00;  Start 3/16/20 at 03:00;  Stop 3/16/20 at 03:59;  Status DC


Ondansetron HCl (Zofran) 4 mg 1X  ONCE IVP  Last administered on 3/16/20at 

03:27;  Start 3/16/20 at 03:00;  Stop 3/16/20 at 03:01;  Status DC


Morphine Sulfate (Morphine Sulfate) 4 mg 1X  ONCE IV ;  Start 3/16/20 at 03:00; 

Stop 3/16/20 at 03:01;  Status Cancel


Ketorolac Tromethamine (Toradol 30mg Vial) 30 mg 1X  ONCE IV  Last administered 

on 3/16/20at 02:54;  Start 3/16/20 at 03:00;  Stop 3/16/20 at 03:01;  Status DC


Fentanyl Citrate (Fentanyl 2ml Vial) 25 mcg 1X  ONCE IVP  Last administered on 

3/16/20at 03:23;  Start 3/16/20 at 03:30;  Stop 3/16/20 at 03:31;  Status DC


Fentanyl Citrate (Fentanyl 2ml Vial) 100 mcg STK-MED ONCE .ROUTE ;  Start 

3/16/20 at 03:18;  Stop 3/16/20 at 03:18;  Status DC


Iohexol (Omnipaque 350 Mg/ml) 90 ml 1X  ONCE IV  Last administered on 3/16/20at 

03:25;  Start 3/16/20 at 03:30;  Stop 3/16/20 at 03:31;  Status DC


Info (CONTRAST GIVEN -- Rx MONITORING) 1 each PRN DAILY  PRN MC SEE COMMENTS;  

Start 3/16/20 at 03:30;  Stop 3/18/20 at 03:29;  Status DC


Hydromorphone HCl (Dilaudid) 0.5 mg 1X  ONCE IV  Last administered on 3/16/20at 

03:55;  Start 3/16/20 at 04:30;  Stop 3/16/20 at 04:32;  Status DC


Ondansetron HCl (Zofran) 4 mg PRN Q8HRS  PRN IV NAUSEA/VOMITING 1ST CHOICE;  

Start 3/16/20 at 05:00;  Stop 3/16/20 at 09:27;  Status DC


Morphine Sulfate (Morphine Sulfate) 2 mg PRN Q2HR  PRN IV SEVERE PAIN 7-10 Last 

administered on 3/17/20at 12:26;  Start 3/16/20 at 05:00;  Stop 3/17/20 at 

14:15;  Status DC


Sodium Chloride 1,000 ml @  125 mls/hr Q8H IV  Last administered on 3/16/20at 

20:56;  Start 3/16/20 at 05:00;  Stop 3/17/20 at 04:59;  Status DC


Hydromorphone HCl (Dilaudid) 0.5 mg PRN Q3HRS  PRN IV SEVERE PAIN 7-10 Last 

administered on 3/17/20at 10:06;  Start 3/16/20 at 05:00;  Stop 3/17/20 at 

12:01;  Status DC


Piperacillin Sod/ Tazobactam Sod 4.5 gm/Sodium Chloride 100 ml @  200 mls/hr 1X 

ONCE IV  Last administered on 3/16/20at 05:44;  Start 3/16/20 at 06:00;  Stop 

3/16/20 at 06:29;  Status DC


Ondansetron HCl (Zofran) 4 mg PRN Q4HRS  PRN IV NAUSEA/VOMITING 1ST CHOICE Last 

administered on 7/9/20at 22:37;  Start 3/16/20 at 09:30


Insulin Human Lispro (HumaLOG) 0-9 UNITS Q6HRS SQ  Last administered on 7/8/20at

11:54;  Start 3/16/20 at 09:30


Dextrose (Dextrose 50%-Water Syringe) 12.5 gm PRN Q15MIN  PRN IV SEE COMMENTS;  

Start 3/16/20 at 09:30


Pantoprazole Sodium (PROTONIX VIAL for IV PUSH) 40 mg DAILYAC IVP  Last admi

nistered on 7/10/20at 08:31;  Start 3/16/20 at 11:30


Prochlorperazine Edisylate (Compazine) 10 mg PRN Q6HRS  PRN IV NAUSEA/VOMITING, 

2nd CHOICE Last administered on 6/27/20at 10:53;  Start 3/16/20 at 17:45


Atenolol (Tenormin) 100 mg DAILY PO ;  Start 3/17/20 at 09:00;  Stop 3/16/20 at 

20:08;  Status DC


Metoprolol Tartrate (Lopressor Vial) 2.5 mg Q6HRS IVP  Last administered on 3/17

/20at 05:51;  Start 3/16/20 at 20:15;  Stop 3/17/20 at 10:02;  Status DC


Metoprolol Tartrate (Lopressor Vial) 5 mg Q6HRS IVP  Last administered on 

3/26/20at 00:12;  Start 3/17/20 at 10:15;  Stop 3/28/20 at 08:48;  Status DC


Hydromorphone HCl (Dilaudid) 1 mg PRN Q3HRS  PRN IV SEVERE PAIN 7-10 Last 

administered on 3/23/20at 05:13;  Start 3/17/20 at 12:00;  Stop 3/31/20 at 

00:25;  Status DC


Lidocaine HCl (Buffered Lidocaine 1%) 3 ml STK-MED ONCE .ROUTE ;  Start 3/17/20 

at 12:55;  Stop 3/17/20 at 12:56;  Status DC


Albumin Human 500 ml @  125 mls/hr 1X  ONCE IV  Last administered on 3/17/20at 

14:33;  Start 3/17/20 at 14:30;  Stop 3/17/20 at 18:32;  Status DC


Norepinephrine Bitartrate 8 mg/ Dextrose 258 ml @  17.299 mls/ hr CONT  PRN IV 

PER PROTOCOL Last administered on 4/14/20at 12:48;  Start 3/17/20 at 15:30;  

Stop 4/17/20 at 09:19;  Status DC


Sodium Chloride 1,000 ml @  125 mls/hr Q8H IV  Last administered on 3/17/20at 

21:04;  Start 3/17/20 at 16:00;  Stop 3/18/20 at 02:42;  Status DC


Albumin Human 500 ml @  125 mls/hr PRN BID  PRN IV After every 2L NSS & BP < 

90mm Last administered on 6/30/20at 16:06;  Start 3/17/20 at 16:00;  Stop 7/3/20

at 09:30;  Status DC


Iohexol (Omnipaque 300 Mg/ml) 60 ml 1X  ONCE IV  Last administered on 3/17/20at 

17:20;  Start 3/17/20 at 17:00;  Stop 3/17/20 at 17:01;  Status DC


Info (CONTRAST GIVEN -- Rx MONITORING) 1 each PRN DAILY  PRN MC SEE COMMENTS;  

Start 3/17/20 at 17:00;  Stop 3/19/20 at 16:59;  Status DC


Meropenem 1 gm/ Sodium Chloride 100 ml @  200 mls/hr Q8HRS IV  Last administered

on 3/18/20at 05:45;  Start 3/17/20 at 20:00;  Stop 3/18/20 at 08:48;  Status DC


Furosemide (Lasix) 40 mg 1X  ONCE IVP  Last administered on 3/17/20at 22:12;  

Start 3/17/20 at 22:30;  Stop 3/17/20 at 22:31;  Status DC


Calcium Chloride 1000 mg/Sodium Chloride 110 ml @  220 mls/hr 1X  ONCE IV  Last 

administered on 3/17/20at 22:11;  Start 3/17/20 at 22:30;  Stop 3/17/20 at 

22:59;  Status DC


Albuterol Sulfate (Ventolin Neb Soln) 2.5 mg 1X  ONCE NEB  Last administered on 

3/18/20at 00:56;  Start 3/17/20 at 22:30;  Stop 3/17/20 at 22:31;  Status DC


Insulin Human Regular (HumuLIN R VIAL) 5 unit 1X  ONCE IV  Last administered on 

3/17/20at 22:14;  Start 3/17/20 at 22:30;  Stop 3/17/20 at 22:31;  Status DC


Magnesium Sulfate 50 ml @ 25 mls/hr 1X  ONCE IV  Last administered on 3/18/20at 

02:57;  Start 3/18/20 at 03:00;  Stop 3/18/20 at 04:59;  Status DC


Calcium Gluconate 1000 mg/Sodium Chloride 110 ml @  220 mls/hr 1X  ONCE IV  Last

administered on 3/18/20at 02:46;  Start 3/18/20 at 03:00;  Stop 3/18/20 at 

03:29;  Status DC


Sodium Chloride 1,000 ml @  200 mls/hr Q5H IV  Last administered on 3/18/20at 

02:46;  Start 3/18/20 at 03:00;  Stop 3/18/20 at 10:21;  Status DC


Calcium Gluconate 1000 mg/Sodium Chloride 110 ml @  220 mls/hr 1X  ONCE IV  Last

administered on 3/18/20at 03:21;  Start 3/18/20 at 03:30;  Stop 3/18/20 at 

03:59;  Status DC


Sodium Bicarbonate 50 meq/Sodium Chloride 1,050 ml @  75 mls/hr Q14H IV  Last 

administered on 3/22/20at 21:10;  Start 3/18/20 at 07:30;  Stop 3/23/20 at 

10:28;  Status DC


Calcium Gluconate 2000 mg/Sodium Chloride 120 ml @  220 mls/hr 1X  ONCE IV  Last

administered on 3/18/20at 09:05;  Start 3/18/20 at 07:30;  Stop 3/18/20 at 

08:02;  Status DC


Lidocaine HCl (Xylocaine-Mpf 1% 2ml Vial) 2 ml STK-MED ONCE .ROUTE ;  Start 

3/18/20 at 08:47;  Stop 3/18/20 at 08:47;  Status DC


Meropenem 500 mg/ Sodium Chloride 50 ml @  100 mls/hr Q12HR IV  Last 

administered on 3/23/20at 21:01;  Start 3/18/20 at 18:00;  Stop 3/24/20 at 

07:58;  Status DC


Lidocaine HCl (Buffered Lidocaine 1%) 3 ml STK-MED ONCE .ROUTE ;  Start 3/18/20 

at 09:46;  Stop 3/18/20 at 09:46;  Status DC


Lidocaine HCl (Buffered Lidocaine 1%) 6 ml 1X  ONCE INJ  Last administered on 

3/18/20at 10:26;  Start 3/18/20 at 10:15;  Stop 3/18/20 at 10:16;  Status DC


Info (Tpn Per Pharmacy) 1 each PRN DAILY  PRN MC SEE COMMENTS Last administered 

on 7/9/20at 13:13;  Start 3/18/20 at 12:00


Sodium Chloride 1,000 ml @  1,000 mls/hr Q1H PRN IV hypotension;  Start 3/18/20 

at 12:07;  Stop 3/18/20 at 18:06;  Status DC


Diphenhydramine HCl (Benadryl) 25 mg 1X PRN  PRN IV ITCHING;  Start 3/18/20 at 

12:15;  Stop 3/19/20 at 12:14;  Status DC


Diphenhydramine HCl (Benadryl) 25 mg 1X PRN  PRN IV ITCHING;  Start 3/18/20 at 

12:15;  Stop 3/19/20 at 12:14;  Status DC


Sodium Chloride 1,000 ml @  400 mls/hr Q2H30M PRN IV PATENCY;  Start 3/18/20 at 

12:07;  Stop 3/19/20 at 00:06;  Status DC


Info (PHARMACY MONITORING -- do not chart) 1 each PRN DAILY  PRN MC SEE 

COMMENTS;  Start 3/18/20 at 12:15;  Stop 3/20/20 at 08:13;  Status DC


Sodium Chloride 90 meq/Calcium Gluconate 10 meq/ Multivitamins 10 ml/Chromium/ 

Copper/Manganese/ Seleni/Zn 1 ml/ Total Parenteral Nutrition/Amino 

Acids/Dextrose/ Fat Emulsion Intravenous 55.005 ml  @ 2.292 mls/hr TPN  CONT IV 

;  Start 3/18/20 at 22:00;  Stop 3/18/20 at 12:33;  Status DC


Info (Tpn Per Pharmacy) 1 each PRN DAILY  PRN MC SEE COMMENTS;  Start 3/18/20 at

12:30;  Status UNV


Sodium Chloride 90 meq/Calcium Gluconate 10 meq/ Multivitamins 10 ml/Chromium/ 

Copper/Manganese/ Seleni/Zn 0.5 ml/ Total Parenteral Nutrition/Amino 

Acids/Dextrose/ Fat Emulsion Intravenous 1,512 ml @  63 mls/hr TPN  CONT IV  

Last administered on 3/18/20at 22:06;  Start 3/18/20 at 22:00;  Stop 3/19/20 at 

21:59;  Status DC


Calcium Carbonate/ Glycine (Tums) 500 mg PRN AFTMEALHC  PRN PO INDIGESTION;  

Start 3/18/20 at 17:45;  Stop 5/13/20 at 10:25;  Status DC


Calcium Gluconate (Calcium Gluconate) 2,000 mg 1X  ONCE IVP  Last administered 

on 3/19/20at 02:19;  Start 3/19/20 at 02:15;  Stop 3/19/20 at 02:16;  Status DC


Calcium Chloride 3000 mg/Sodium Chloride 1,030 ml @  50 mls/hr [id number] IV  Last 

administered on 3/21/20at 02:17;  Start 3/19/20 at 08:00;  Stop 3/21/20 at 

15:23;  Status DC


Lorazepam (Ativan Inj) 1 mg PRN Q4HRS  PRN IVP ANXIETY / AGITATION, 2nd choic 

Last administered on 4/17/20at 03:51;  Start 3/19/20 at 09:00;  Stop 4/17/20 at 

09:19;  Status DC


Sodium Chloride 1,000 ml @  1,000 mls/hr Q1H PRN IV hypotension;  Start 3/19/20 

at 08:56;  Stop 3/19/20 at 14:55;  Status DC


Albumin Human 200 ml @  200 mls/hr 1X PRN  PRN IV Hypotension;  Start 3/19/20 at

09:00;  Stop 3/19/20 at 14:59;  Status DC


Diphenhydramine HCl (Benadryl) 25 mg 1X PRN  PRN IV ITCHING;  Start 3/19/20 at 

09:00;  Stop 3/20/20 at 08:59;  Status DC


Diphenhydramine HCl (Benadryl) 25 mg 1X PRN  PRN IV ITCHING;  Start 3/19/20 at 

09:00;  Stop 3/20/20 at 08:59;  Status DC


Sodium Chloride 1,000 ml @  400 mls/hr Q2H30M PRN IV PATENCY;  Start 3/19/20 at 

08:56;  Stop 3/19/20 at 20:55;  Status DC


Info (PHARMACY MONITORING -- do not chart) 1 each PRN DAILY  PRN MC SEE 

COMMENTS;  Start 3/19/20 at 09:00;  Status UNV


Info (PHARMACY MONITORING -- do not chart) 1 each PRN DAILY  PRN MC SEE 

COMMENTS;  Start 3/19/20 at 09:00;  Stop 3/20/20 at 08:13;  Status DC


Digoxin (Lanoxin) 500 mcg 1X  ONCE IV  Last administered on 3/19/20at 10:04;  

Start 3/19/20 at 10:00;  Stop 3/19/20 at 10:01;  Status DC


Digoxin (Lanoxin) 125 mcg 1X  ONCE IV  Last administered on 3/19/20at 17:10;  

Start 3/19/20 at 18:00;  Stop 3/19/20 at 18:01;  Status DC


Magnesium Sulfate 100 ml @  25 mls/hr 1X  ONCE IV  Last administered on 

3/19/20at 12:48;  Start 3/19/20 at 13:00;  Stop 3/19/20 at 16:59;  Status DC


Sodium Chloride 90 meq/Magnesium Sulfate 10 meq/ Calcium Gluconate 20 meq/ 

Multivitamins 10 ml/Chromium/ Copper/Manganese/ Seleni/Zn 0.5 ml/ Total 

Parenteral Nutrition/Amino Acids/Dextrose/ Fat Emulsion Intravenous 1,512 ml @  

63 mls/hr TPN  CONT IV  Last administered on 3/19/20at 22:25;  Start 3/19/20 at 

22:00;  Stop 3/20/20 at 21:59;  Status DC


Sodium Chloride 1,000 ml @  1,000 mls/hr Q1H PRN IV hypotension;  Start 3/20/20 

at 08:05;  Stop 3/20/20 at 14:04;  Status DC


Albumin Human 200 ml @  200 mls/hr 1X  ONCE IV  Last administered on 3/20/20at 

08:57;  Start 3/20/20 at 08:15;  Stop 3/20/20 at 09:14;  Status DC


Diphenhydramine HCl (Benadryl) 25 mg 1X PRN  PRN IV ITCHING;  Start 3/20/20 at 

08:15;  Stop 3/21/20 at 08:14;  Status DC


Diphenhydramine HCl (Benadryl) 25 mg 1X PRN  PRN IV ITCHING;  Start 3/20/20 at 

08:15;  Stop 3/21/20 at 08:14;  Status DC


Sodium Chloride 1,000 ml @  400 mls/hr Q2H30M PRN IV PATENCY;  Start 3/20/20 at 

08:05;  Stop 3/20/20 at 20:04;  Status DC


Info (PHARMACY MONITORING -- do not chart) 1 each PRN DAILY  PRN MC SEE 

COMMENTS;  Start 3/20/20 at 08:15;  Stop 3/24/20 at 07:57;  Status DC


Sodium Chloride 90 meq/Potassium Chloride 15 meq/ Potassium Phosphate 10 mmol/ 

Magnesium Sulfate 10 meq/Calcium Gluconate 20 meq/ Multivitamins 10 ml/Chromium/

Copper/Manganese/ Seleni/Zn 0.5 ml/ Total Parenteral Nutrition/Amino 

Acids/Dextrose/ Fat Emulsion Intravenous 1,512 ml @  63 mls/hr TPN  CONT IV  

Last administered on 3/20/20at 21:01;  Start 3/20/20 at 22:00;  Stop 3/21/20 at 

21:59;  Status DC


Potassium Chloride/Water 100 ml @  100 mls/hr 1X  ONCE IV  Last administered on 

3/20/20at 14:09;  Start 3/20/20 at 14:00;  Stop 3/20/20 at 14:59;  Status DC


Benzocaine (Hurricaine One) 1 spray 1X  ONCE MM  Last administered on 3/20/20at 

16:38;  Start 3/20/20 at 14:30;  Stop 3/20/20 at 14:31;  Status DC


Lidocaine HCl (Glydo (Lidocaine) Jelly) 1 thomas 1X  ONCE MM  Last administered on 

3/20/20at 16:38;  Start 3/20/20 at 14:30;  Stop 3/20/20 at 14:31;  Status DC


Linezolid/Dextrose 300 ml @  300 mls/hr Q12HR IV  Last administered on 3/26/20at

21:04;  Start 3/20/20 at 20:00;  Stop 3/27/20 at 07:50;  Status DC


Acetaminophen (Tylenol) 650 mg PRN Q6HRS  PRN PO MILD PAIN / TEMP;  Start 

3/21/20 at 03:30;  Stop 3/21/20 at 03:36;  Status DC


Acetaminophen (Tylenol) 650 mg PRN Q6HRS  PRN PEG MILD PAIN / TEMP Last 

administered on 4/16/20at 19:56;  Start 3/21/20 at 03:36;  Stop 5/13/20 at 

10:25;  Status DC


Sodium Chloride 1,000 ml @  1,000 mls/hr Q1H PRN IV hypotension;  Start 3/21/20 

at 07:50;  Stop 3/21/20 at 13:49;  Status DC


Albumin Human 200 ml @  200 mls/hr 1X PRN  PRN IV Hypotension;  Start 3/21/20 at

08:00;  Stop 3/21/20 at 13:59;  Status DC


Sodium Chloride (Normal Saline Flush) 10 ml 1X PRN  PRN IV AP catheter pack;  

Start 3/21/20 at 08:00;  Stop 3/22/20 at 07:59;  Status DC


Sodium Chloride (Normal Saline Flush) 10 ml 1X PRN  PRN IV  catheter pack;  

Start 3/21/20 at 08:00;  Stop 3/22/20 at 07:59;  Status DC


Sodium Chloride 1,000 ml @  400 mls/hr Q2H30M PRN IV PATENCY;  Start 3/21/20 at 

07:50;  Stop 3/21/20 at 19:49;  Status DC


Info (PHARMACY MONITORING -- do not chart) 1 each PRN DAILY  PRN MC SEE 

COMMENTS;  Start 3/21/20 at 08:00;  Status UNV


Info (PHARMACY MONITORING -- do not chart) 1 each PRN DAILY  PRN MC SEE 

COMMENTS;  Start 3/21/20 at 08:00;  Stop 3/23/20 at 08:25;  Status DC


Sodium Chloride 90 meq/Potassium Chloride 15 meq/ Potassium Phosphate 10 mmol/ 

Magnesium Sulfate 10 meq/Calcium Gluconate 20 meq/ Multivitamins 10 ml/Chromium/

Copper/Manganese/ Seleni/Zn 0.5 ml/ Total Parenteral Nutrition/Amino 

Acids/Dextrose/ Fat Emulsion Intravenous 1,512 ml @  63 mls/hr TPN  CONT IV  

Last administered on 3/21/20at 20:57;  Start 3/21/20 at 22:00;  Stop 3/22/20 at 

21:59;  Status DC


Sodium Chloride 90 meq/Potassium Chloride 15 meq/ Potassium Phosphate 15 mmol/ 

Magnesium Sulfate 10 meq/Calcium Gluconate 20 meq/ Multivitamins 10 ml/Chromium/

Copper/Manganese/ Seleni/Zn 0.5 ml/ Total Parenteral Nutrition/Amino 

Acids/Dextrose/ Fat Emulsion Intravenous 1,512 ml @  63 mls/hr TPN  CONT IV ;  

Start 3/22/20 at 22:00;  Stop 3/22/20 at 14:16;  Status DC


Sodium Chloride 90 meq/Potassium Chloride 15 meq/ Potassium Phosphate 15 mmol/ 

Magnesium Sulfate 10 meq/Calcium Gluconate 20 meq/ Multivitamins 10 ml/Chromium/

Copper/Manganese/ Seleni/Zn 0.5 ml/ Total Parenteral Nutrition/Amino 

Acids/Dextrose/ Fat Emulsion Intravenous 1,200 ml @  50 mls/hr TPN  CONT IV ;  

Start 3/22/20 at 22:00;  Stop 3/22/20 at 14:17;  Status DC


Sodium Chloride 90 meq/Potassium Chloride 15 meq/ Potassium Phosphate 10 mmol/ 

Magnesium Sulfate 10 meq/Calcium Gluconate 20 meq/ Multivitamins 10 ml/Chromium/

Copper/Manganese/ Seleni/Zn 0.5 ml/ Total Parenteral Nutrition/Amino 

Acids/Dextrose/ Fat Emulsion Intravenous 1,200 ml @  50 mls/hr TPN  CONT IV  

Last administered on 3/22/20at 23:29;  Start 3/22/20 at 22:00;  Stop 3/23/20 at 

21:59;  Status DC


Sodium Chloride 1,000 ml @  1,000 mls/hr Q1H PRN IV hypotension;  Start 3/23/20 

at 07:28;  Stop 3/23/20 at 13:27;  Status DC


Albumin Human 200 ml @  200 mls/hr 1X  ONCE IV  Last administered on 3/23/20at 

08:51;  Start 3/23/20 at 07:30;  Stop 3/23/20 at 08:29;  Status DC


Diphenhydramine HCl (Benadryl) 25 mg 1X PRN  PRN IV ITCHING;  Start 3/23/20 at 

07:30;  Stop 3/24/20 at 07:29;  Status DC


Diphenhydramine HCl (Benadryl) 25 mg 1X PRN  PRN IV ITCHING;  Start 3/23/20 at 

07:30;  Stop 3/24/20 at 07:29;  Status DC


Sodium Chloride 1,000 ml @  400 mls/hr Q2H30M PRN IV PATENCY;  Start 3/23/20 at 

07:28;  Stop 3/23/20 at 19:27;  Status DC


Info (PHARMACY MONITORING -- do not chart) 1 each PRN DAILY  PRN MC SEE COMMENTS

;  Start 3/23/20 at 07:30;  Stop 4/3/20 at 13:01;  Status DC


Metronidazole 100 ml @  100 mls/hr Q6HRS IV  Last administered on 4/8/20at 

06:26;  Start 3/23/20 at 08:30;  Stop 4/8/20 at 09:58;  Status DC


Micafungin Sodium 100 mg/Dextrose 100 ml @  100 mls/hr Q24H IV  Last 

administered on 4/30/20at 08:18;  Start 3/23/20 at 09:00;  Stop 4/30/20 at 

20:58;  Status DC


Propofol 0 ml @ As Directed STK-MED ONCE IV ;  Start 3/23/20 at 07:53;  Stop 

3/23/20 at 07:53;  Status DC


Etomidate (Amidate) 20 mg STK-MED ONCE IV ;  Start 3/23/20 at 07:53;  Stop 

3/23/20 at 07:54;  Status DC


Midazolam HCl (Versed) 5 mg STK-MED ONCE .ROUTE ;  Start 3/23/20 at 07:57;  Stop

3/23/20 at 07:57;  Status DC


Fentanyl Citrate 30 ml @ 0 mls/hr CONT  PRN IV SEE PROTOCOL Last administered on

4/17/20at 06:12;  Start 3/23/20 at 08:15;  Stop 4/17/20 at 09:19;  Status DC


Artificial Tears (Artificial Tears) 1 drop PRN Q1HR  PRN OU DRY EYE, 1st choice;

 Start 3/23/20 at 08:15;  Stop 4/29/20 at 05:31;  Status DC


Midazolam HCl 50 mg/Sodium Chloride 50 ml @ 0 mls/hr CONT  PRN IV SEE PROTOCOL 

Last administered on 3/26/20at 22:39;  Start 3/23/20 at 08:15;  Stop 3/28/20 at 

15:59;  Status DC


Etomidate (Amidate) 8 mg 1X  ONCE IV  Last administered on 3/23/20at 08:33;  

Start 3/23/20 at 08:30;  Stop 3/23/20 at 08:31;  Status DC


Succinylcholine Chloride (Anectine) 120 mg 1X  ONCE IV  Last administered on 

3/23/20at 08:34;  Start 3/23/20 at 08:30;  Stop 3/23/20 at 08:31;  Status DC


Midazolam HCl (Versed) 5 mg 1X  ONCE IV ;  Start 3/23/20 at 08:30;  Stop 3/23/20

at 08:31;  Status DC


Potassium Chloride 15 meq/ Bicarbonate Dialysis Soln w/ out KCl 5,007.5 ml  @ 

1,000 mls/ hr Q5H1M IV  Last administered on 3/24/20at 11:11;  Start 3/23/20 at 

12:00;  Stop 3/24/20 at 11:15;  Status DC


Potassium Chloride 15 meq/ Bicarbonate Dialysis Soln w/ out KCl 5,007.5 ml  @ 

1,000 mls/ hr Q5H1M IV  Last administered on 3/24/20at 11:12;  Start 3/23/20 at 

12:00;  Stop 3/24/20 at 11:17;  Status DC


Potassium Chloride 15 meq/ Bicarbonate Dialysis Soln w/ out KCl 5,007.5 ml  @ 

1,000 mls/ hr Q5H1M IV  Last administered on 3/24/20at 11:11;  Start 3/23/20 at 

12:00;  Stop 3/24/20 at 11:19;  Status DC


Sodium Chloride 90 meq/Potassium Chloride 15 meq/ Potassium Phosphate 10 mmol/ 

Magnesium Sulfate 10 meq/Calcium Gluconate 20 meq/ Multivitamins 10 ml/Chromium/

Copper/Manganese/ Seleni/Zn 0.5 ml/ Total Parenteral Nutrition/Amino 

Acids/Dextrose/ Fat Emulsion Intravenous 1,400 ml @  58.333 mls/ hr TPN  CONT IV

 Last administered on 3/23/20at 21:42;  Start 3/23/20 at 22:00;  Stop 3/24/20 at

21:59;  Status DC


Heparin Sodium (Porcine) (Heparin Sodium) 5,000 unit Q8HRS SQ  Last administered

on 3/28/20at 05:55;  Start 3/23/20 at 15:00;  Stop 3/28/20 at 13:28;  Status DC


Meropenem 500 mg/ Sodium Chloride 50 ml @  100 mls/hr Q6HRS IV  Last 

administered on 3/25/20at 06:00;  Start 3/24/20 at 09:00;  Stop 3/25/20 at 

07:29;  Status DC


Potassium Phosphate 20 mmol/ Sodium Chloride 106.6667 ml @  51.667 m... 1X  ONCE

IV  Last administered on 3/24/20at 11:22;  Start 3/24/20 at 10:15;  Stop 3/24/20

at 12:18;  Status DC


Acetaminophen (Tylenol Supp) 650 mg PRN Q6HRS  PRN IL MILD PAIN / TEMP > 100.3'F

Last administered on 6/29/20at 18:16;  Start 3/24/20 at 10:30


Potassium Chloride/Water 100 ml @  100 mls/hr Q1H IV  Last administered on 

3/24/20at 12:12;  Start 3/24/20 at 11:00;  Stop 3/24/20 at 12:59;  Status DC


Potassium Chloride 20 meq/ Bicarbonate Dialysis Soln w/ out KCl 5,010 ml @  

1,000 mls/hr Q5H1M IV  Last administered on 3/25/20at 08:48;  Start 3/24/20 at 

12:00;  Stop 3/25/20 at 13:03;  Status DC


Potassium Chloride 20 meq/ Bicarbonate Dialysis Soln w/ out KCl 5,010 ml @  

1,000 mls/hr Q5H1M IV  Last administered on 3/29/20at 14:52;  Start 3/24/20 at 

11:30;  Stop 3/29/20 at 19:59;  Status DC


Potassium Chloride 20 meq/ Bicarbonate Dialysis Soln w/ out KCl 5,010 ml @  

1,000 mls/hr Q5H1M IV  Last administered on 3/29/20at 14:53;  Start 3/24/20 at 

11:30;  Stop 3/29/20 at 19:59;  Status DC


Sodium Chloride 90 meq/Potassium Chloride 15 meq/ Potassium Phosphate 15 mmol/ 

Magnesium Sulfate 10 meq/Calcium Gluconate 15 meq/ Multivitamins 10 ml/Chromium/

Copper/Manganese/ Seleni/Zn 0.5 ml/ Total Parenteral Nutrition/Amino 

Acids/Dextrose/ Fat Emulsion Intravenous 1,400 ml @  58.333 mls/ hr TPN  CONT IV

 Last administered on 3/24/20at 22:17;  Start 3/24/20 at 22:00;  Stop 3/25/20 at

21:59;  Status DC


Cefepime HCl (Maxipime) 2 gm Q12HR IVP  Last administered on 4/7/20at 20:56;  

Start 3/25/20 at 09:00;  Stop 4/8/20 at 09:58;  Status DC


Daptomycin 500 mg/ Sodium Chloride 50 ml @  100 mls/hr Q48H IV  Last 

administered on 4/10/20at 09:57;  Start 3/25/20 at 08:30;  Stop 4/10/20 at 

10:07;  Status DC


Lidocaine HCl (Buffered Lidocaine 1%) 3 ml 1X  ONCE INJ  Last administered on 

3/25/20at 10:27;  Start 3/25/20 at 10:30;  Stop 3/25/20 at 10:31;  Status DC


Potassium Phosphate 20 mmol/ Sodium Chloride 106.6667 ml @  51.667 m... 1X  ONCE

IV  Last administered on 3/25/20at 12:51;  Start 3/25/20 at 13:00;  Stop 3/25/20

at 15:03;  Status DC


Sodium Chloride 90 meq/Potassium Chloride 15 meq/ Potassium Phosphate 18 mmol/ 

Magnesium Sulfate 8 meq/Calcium Gluconate 15 meq/ Multivitamins 10 ml/Chromium/ 

Copper/Manganese/ Seleni/Zn 0.5 ml/ Total Parenteral Nutrition/Amino 

Acids/Dextrose/ Fat Emulsion Intravenous 1,400 ml @  58.333 mls/ hr TPN  CONT IV

 Last administered on 3/25/20at 22:16;  Start 3/25/20 at 22:00;  Stop 3/26/20 at

21:59;  Status DC


Potassium Chloride 20 meq/ Bicarbonate Dialysis Soln w/ out KCl 5,010 ml @  

1,000 mls/hr Q5H1M IV  Last administered on 3/29/20at 14:54;  Start 3/25/20 at 

16:00;  Stop 3/29/20 at 19:59;  Status DC


Multi-Ingred Cream/Lotion/Oil/ Oint (Artificial Tears Eye Ointment) 1 thomas PRN 

Q1HR  PRN OU DRY EYE, 2nd choice Last administered on 4/13/20at 08:19;  Start 3

/25/20 at 17:30;  Stop 6/3/20 at 14:39;  Status DC


Sodium Chloride 90 meq/Potassium Chloride 15 meq/ Potassium Phosphate 18 mmol/ 

Magnesium Sulfate 8 meq/Calcium Gluconate 15 meq/ Multivitamins 10 ml/Chromium/ 

Copper/Manganese/ Seleni/Zn 0.5 ml/ Total Parenteral Nutrition/Amino 

Acids/Dextrose/ Fat Emulsion Intravenous 1,400 ml @  58.333 mls/ hr TPN  CONT IV

 Last administered on 3/26/20at 22:00;  Start 3/26/20 at 22:00;  Stop 3/27/20 at

21:59;  Status DC


Albumin Human 500 ml @  125 mls/hr 1X  ONCE IV ;  Start 3/26/20 at 14:15;  Stop 

3/26/20 at 18:14;  Status DC


Sodium Chloride 90 meq/Potassium Chloride 15 meq/ Potassium Phosphate 18 mmol/ 

Magnesium Sulfate 8 meq/Calcium Gluconate 15 meq/ Multivitamins 10 ml/Chromium/ 

Copper/Manganese/ Seleni/Zn 0.5 ml/ Insulin Human Regular 10 unit/ Total 

Parenteral Nutrition/Amino Acids/Dextrose/ Fat Emulsion Intravenous 1,400 ml @  

58.333 mls/ hr TPN  CONT IV  Last administered on 3/27/20at 21:43;  Start 

3/27/20 at 22:00;  Stop 3/28/20 at 21:59;  Status DC


Lidocaine HCl (Buffered Lidocaine 1%) 3 ml STK-MED ONCE .ROUTE ;  Start 3/25/20 

at 10:00;  Stop 3/27/20 at 13:57;  Status DC


Midazolam HCl 100 mg/Sodium Chloride 100 ml @ 7 mls/hr CONT  PRN IV SEE PROTOCOL

Last administered on 4/8/20at 15:35;  Start 3/28/20 at 16:00;  Stop 6/3/20 at 

14:38;  Status DC


Sodium Chloride 90 meq/Potassium Chloride 15 meq/ Potassium Phosphate 18 mmol/ 

Magnesium Sulfate 8 meq/Calcium Gluconate 15 meq/ Multivitamins 10 ml/Chromium/ 

Copper/Manganese/ Seleni/Zn 0.5 ml/ Insulin Human Regular 15 unit/ Total 

Parenteral Nutrition/Amino Acids/Dextrose/ Fat Emulsion Intravenous 1,400 ml @  

58.333 mls/ hr TPN  CONT IV  Last administered on 3/28/20at 20:34;  Start 

3/28/20 at 22:00;  Stop 3/29/20 at 21:59;  Status DC


Info ([facility] Electrolyte Protocol) 1 ea CONT PRN  PRN MC PER PROTOCOL;  Start 

3/29/20 at 13:15


Sodium Chloride 90 meq/Potassium Chloride 15 meq/ Potassium Phosphate 18 mmol/ M

agnesium Sulfate 8 meq/Calcium Gluconate 15 meq/ Multivitamins 10 ml/Chromium/ 

Copper/Manganese/ Seleni/Zn 0.5 ml/ Insulin Human Regular 15 unit/ Total 

Parenteral Nutrition/Amino Acids/Dextrose/ Fat Emulsion Intravenous 1,400 ml @  

58.333 mls/ hr TPN  CONT IV  Last administered on 3/29/20at 22:05;  Start 

3/29/20 at 22:00;  Stop 3/30/20 at 21:59;  Status DC


Potassium Chloride 15 meq/ Bicarbonate Dialysis Soln w/ out KCl 5,007.5 ml  @ 

1,000 mls/ hr Q5H1M IV  Last administered on 4/1/20at 18:14;  Start 3/29/20 at 

20:00;  Stop 4/2/20 at 13:08;  Status DC


Potassium Chloride 15 meq/ Bicarbonate Dialysis Soln w/ out KCl 5,007.5 ml  @ 

1,000 mls/ hr Q5H1M IV  Last administered on 4/1/20at 18:14;  Start 3/29/20 at 

20:00;  Stop 4/2/20 at 13:08;  Status DC


Potassium Chloride 15 meq/ Bicarbonate Dialysis Soln w/ out KCl 5,007.5 ml  @ 

1,000 mls/ hr Q5H1M IV  Last administered on 4/1/20at 18:14;  Start 3/29/20 at 

20:00;  Stop 4/2/20 at 13:08;  Status DC


Iohexol (Omnipaque 240 Mg/ml) 30 ml 1X  ONCE PO  Last administered on 3/30/20at 

11:30;  Start 3/30/20 at 11:30;  Stop 3/30/20 at 11:33;  Status DC


Info (CONTRAST GIVEN -- Rx MONITORING) 1 each PRN DAILY  PRN MC SEE COMMENTS;  

Start 3/30/20 at 11:45;  Stop 4/1/20 at 11:44;  Status DC


Sodium Chloride 90 meq/Potassium Chloride 15 meq/ Potassium Phosphate 18 mmol/ 

Magnesium Sulfate 8 meq/Calcium Gluconate 15 meq/ Multivitamins 10 ml/Chromium/ 

Copper/Manganese/ Seleni/Zn 0.5 ml/ Insulin Human Regular 15 unit/ Total 

Parenteral Nutrition/Amino Acids/Dextrose/ Fat Emulsion Intravenous 1,400 ml @  

58.333 mls/ hr TPN  CONT IV  Last administered on 3/30/20at 21:47;  Start 

3/30/20 at 22:00;  Stop 3/31/20 at 21:59;  Status DC


Sodium Chloride 90 meq/Potassium Chloride 15 meq/ Potassium Phosphate 18 mmol/ 

Magnesium Sulfate 8 meq/Calcium Gluconate 15 meq/ Multivitamins 10 ml/Chromium/ 

Copper/Manganese/ Seleni/Zn 0.5 ml/ Insulin Human Regular 20 unit/ Total 

Parenteral Nutrition/Amino Acids/Dextrose/ Fat Emulsion Intravenous 1,400 ml @  

58.333 mls/ hr TPN  CONT IV  Last administered on 3/31/20at 21:36;  Start 

3/31/20 at 22:00;  Stop 4/1/20 at 21:59;  Status DC


Alteplase, Recombinant (Cathflo For Central Catheter Clearance) 1 mg 1X  ONCE 

INT CAT  Last administered on 3/31/20at 20:03;  Start 3/31/20 at 19:30;  Stop 

3/31/20 at 19:46;  Status DC


Alteplase, Recombinant (Cathflo For Central Catheter Clearance) 1 mg 1X  ONCE 

INT CAT  Last administered on 3/31/20at 22:05;  Start 3/31/20 at 22:00;  Stop 

3/31/20 at 22:01;  Status DC


Sodium Chloride 90 meq/Potassium Chloride 15 meq/ Potassium Phosphate 18 mmol/ 

Magnesium Sulfate 8 meq/Calcium Gluconate 15 meq/ Multivitamins 10 ml/Chromium/ 

Copper/Manganese/ Seleni/Zn 0.5 ml/ Insulin Human Regular 20 unit/ Total 

Parenteral Nutrition/Amino Acids/Dextrose/ Fat Emulsion Intravenous 1,400 ml @  

58.333 mls/ hr TPN  CONT IV  Last administered on 4/1/20at 21:30;  Start 4/1/20 

at 22:00;  Stop 4/2/20 at 21:59;  Status DC


Dexmedetomidine HCl 400 mcg/ Sodium Chloride 100 ml @ 0 mls/hr CONT  PRN IV 

ANXIETY / AGITATION Last administered on 5/30/20at 12:57;  Start 4/2/20 at 0

8:15;  Stop 5/30/20 at 18:31;  Status DC


Sodium Chloride 500 ml @  500 mls/hr 1X PRN  PRN IV ELEVATED BP, SEE COMMENTS;  

Start 4/2/20 at 08:15


Atropine Sulfate (ATROPINE 0.5mg SYRINGE) 0.5 mg PRN Q5MIN  PRN IV SEE COMMENTS;

 Start 4/2/20 at 08:15


Furosemide (Lasix) 20 mg 1X  ONCE IVP  Last administered on 4/2/20at 08:19;  

Start 4/2/20 at 08:15;  Stop 4/2/20 at 08:16;  Status DC


Lidocaine HCl (Buffered Lidocaine 1%) 3 ml STK-MED ONCE .ROUTE ;  Start 4/2/20 

at 08:39;  Stop 4/2/20 at 08:39;  Status DC


Lidocaine HCl (Buffered Lidocaine 1%) 6 ml 1X  ONCE INJ  Last administered on 

4/2/20at 09:05;  Start 4/2/20 at 09:00;  Stop 4/2/20 at 09:06;  Status DC


Sodium Chloride 90 meq/Potassium Chloride 15 meq/ Potassium Phosphate 18 mmol/ 

Magnesium Sulfate 8 meq/Calcium Gluconate 15 meq/ Multivitamins 10 ml/Chromium/ 

Copper/Manganese/ Seleni/Zn 0.5 ml/ Insulin Human Regular 20 unit/ Total 

Parenteral Nutrition/Amino Acids/Dextrose/ Fat Emulsion Intravenous 1,400 ml @  

58.333 mls/ hr TPN  CONT IV  Last administered on 4/2/20at 22:45;  Start 4/2/20 

at 22:00;  Stop 4/3/20 at 21:59;  Status DC


Sodium Chloride 1,000 ml @  1,000 mls/hr Q1H PRN IV hypotension;  Start 4/3/20 

at 07:30;  Stop 4/3/20 at 13:29;  Status DC


Albumin Human 200 ml @  200 mls/hr 1X PRN  PRN IV Hypotension Last administered 

on 4/3/20at 09:36;  Start 4/3/20 at 07:30;  Stop 4/3/20 at 13:29;  Status DC


Sodium Chloride (Normal Saline Flush) 10 ml 1X PRN  PRN IV AP catheter pack;  

Start 4/3/20 at 07:30;  Stop 4/3/20 at 21:29;  Status DC


Sodium Chloride (Normal Saline Flush) 10 ml 1X PRN  PRN IV  catheter pack;  

Start 4/3/20 at 07:30;  Stop 4/4/20 at 07:29;  Status DC


Sodium Chloride 1,000 ml @  400 mls/hr Q2H30M PRN IV PATENCY;  Start 4/3/20 at 

07:30;  Stop 4/3/20 at 19:29;  Status DC


Info (PHARMACY MONITORING -- do not chart) 1 each PRN DAILY  PRN MC SEE 

COMMENTS;  Start 4/3/20 at 07:30;  Stop 4/3/20 at 13:02;  Status DC


Info (PHARMACY MONITORING -- do not chart) 1 each PRN DAILY  PRN MC SEE 

COMMENTS;  Start 4/3/20 at 07:30;  Stop 4/5/20 at 12:45;  Status DC


Sodium Chloride 90 meq/Potassium Chloride 15 meq/ Potassium Phosphate 10 mmol/ 

Magnesium Sulfate 8 meq/Calcium Gluconate 15 meq/ Multivitamins 10 ml/Chromium/ 

Copper/Manganese/ Seleni/Zn 0.5 ml/ Insulin Human Regular 25 unit/ Total 

Parenteral Nutrition/Amino Acids/Dextrose/ Fat Emulsion Intravenous 1,400 ml @  

58.333 mls/ hr TPN  CONT IV  Last administered on 4/3/20at 22:19;  Start 4/3/20 

at 22:00;  Stop 4/4/20 at 21:59;  Status DC


Heparin Sodium (Porcine) (Heparin Sodium) 5,000 unit Q12HR SQ  Last administered

on 4/26/20at 08:59;  Start 4/3/20 at 21:00;  Stop 4/26/20 at 10:05;  Status DC


Ondansetron HCl (Zofran) 4 mg PRN Q6HRS  PRN IV NAUSEA/VOMITING;  Start 4/6/20 

at 07:00;  Stop 4/7/20 at 06:59;  Status DC


Fentanyl Citrate (Fentanyl 2ml Vial) 25 mcg PRN Q5MIN  PRN IV MILD PAIN 1-3;  

Start 4/6/20 at 07:00;  Stop 4/7/20 at 06:59;  Status DC


Fentanyl Citrate (Fentanyl 2ml Vial) 50 mcg PRN Q5MIN  PRN IV MODERATE TO SEVERE

PAIN;  Start 4/6/20 at 07:00;  Stop 4/7/20 at 06:59;  Status DC


Ringer's Solution 1,000 ml @  30 mls/hr Q24H IV ;  Start 4/6/20 at 07:00;  Stop 

4/6/20 at 18:59;  Status DC


Lidocaine HCl (Xylocaine-Mpf 1% 2ml Vial) 2 ml PRN 1X  PRN ID PRIOR TO IV START;

 Start 4/6/20 at 07:00;  Stop 4/7/20 at 06:59;  Status DC


Prochlorperazine Edisylate (Compazine) 5 mg PACU PRN  PRN IV NAUSEA, MRX1;  

Start 4/6/20 at 07:00;  Stop 4/7/20 at 06:59;  Status DC


Sodium Chloride 1,000 ml @  1,000 mls/hr Q1H PRN IV hypotension;  Start 4/4/20 

at 09:10;  Stop 4/4/20 at 15:09;  Status DC


Albumin Human 200 ml @  200 mls/hr 1X PRN  PRN IV Hypotension Last administered 

on 4/4/20at 10:10;  Start 4/4/20 at 09:15;  Stop 4/4/20 at 15:14;  Status DC


Sodium Chloride 1,000 ml @  400 mls/hr Q2H30M PRN IV PATENCY;  Start 4/4/20 at 

09:10;  Stop 4/4/20 at 21:09;  Status DC


Info (PHARMACY MONITORING -- do not chart) 1 each PRN DAILY  PRN MC SEE 

COMMENTS;  Start 4/4/20 at 09:15;  Stop 4/5/20 at 12:45;  Status DC


Info (PHARMACY MONITORING -- do not chart) 1 each PRN DAILY  PRN MC SEE 

COMMENTS;  Start 4/4/20 at 09:15;  Stop 4/5/20 at 12:45;  Status DC


Sodium Chloride 90 meq/Potassium Chloride 15 meq/ Potassium Phosphate 10 mmol/ 

Magnesium Sulfate 8 meq/Calcium Gluconate 15 meq/ Multivitamins 10 ml/Chromium/ 

Copper/Manganese/ Seleni/Zn 0.5 ml/ Insulin Human Regular 25 unit/ Total 

Parenteral Nutrition/Amino Acids/Dextrose/ Fat Emulsion Intravenous 1,400 ml @  

58.333 mls/ hr TPN  CONT IV  Last administered on 4/4/20at 22:10;  Start 4/4/20 

at 22:00;  Stop 4/5/20 at 21:59;  Status DC


Magnesium Sulfate 50 ml @ 25 mls/hr PRN DAILY  PRN IV for Mag < 1.7 on am labs 

Last administered on 6/18/20at 10:57;  Start 4/5/20 at 09:15


Sodium Chloride 90 meq/Potassium Chloride 15 meq/ Potassium Phosphate 10 mmol/ 

Magnesium Sulfate 8 meq/Calcium Gluconate 15 meq/ Multivitamins 10 ml/Chromium/ 

Copper/Manganese/ Seleni/Zn 0.5 ml/ Insulin Human Regular 25 unit/ Total 

Parenteral Nutrition/Amino Acids/Dextrose/ Fat Emulsion Intravenous 1,400 ml @  

58.333 mls/ hr TPN  CONT IV  Last administered on 4/5/20at 21:20;  Start 4/5/20 

at 22:00;  Stop 4/6/20 at 21:59;  Status DC


Sodium Chloride 1,000 ml @  1,000 mls/hr Q1H PRN IV hypotension;  Start 4/5/20 

at 12:23;  Stop 4/5/20 at 18:22;  Status DC


Albumin Human 200 ml @  200 mls/hr 1X  ONCE IV  Last administered on 4/5/20at 

13:34;  Start 4/5/20 at 12:30;  Stop 4/5/20 at 13:29;  Status DC


Diphenhydramine HCl (Benadryl) 25 mg 1X PRN  PRN IV ITCHING;  Start 4/5/20 at 

12:30;  Stop 4/6/20 at 12:29;  Status DC


Diphenhydramine HCl (Benadryl) 25 mg 1X PRN  PRN IV ITCHING;  Start 4/5/20 at 

12:30;  Stop 4/6/20 at 12:29;  Status DC


Info (PHARMACY MONITORING -- do not chart) 1 each PRN DAILY  PRN MC SEE 

COMMENTS;  Start 4/5/20 at 12:30;  Status Cancel


Bupivacaine HCl/ Epinephrine Bitart (Sensorcain-Epi 0.5%-1:[id number] Mpf) 30 ml 

STK-MED ONCE .ROUTE  Last administered on 4/6/20at 11:44;  Start 4/6/20 at 

11:00;  Stop 4/6/20 at 11:01;  Status DC


Cellulose (Surgicel Fibrillar 1x2) 1 each STK-MED ONCE .ROUTE ;  Start 4/6/20 at

11:00;  Stop 4/6/20 at 11:01;  Status DC


Sodium Chloride 90 meq/Potassium Chloride 15 meq/ Potassium Phosphate 10 mmol/ 

Magnesium Sulfate 12 meq/Calcium Gluconate 15 meq/ Multivitamins 10 ml/Chromium/

Copper/Manganese/ Seleni/Zn 0.5 ml/ Insulin Human Regular 25 unit/ Total 

Parenteral Nutrition/Amino Acids/Dextrose/ Fat Emulsion Intravenous 1,400 ml @  

58.333 mls/ hr TPN  CONT IV  Last administered on 4/6/20at 22:24;  Start 4/6/20 

at 22:00;  Stop 4/7/20 at 21:59;  Status DC


Propofol 20 ml @ As Directed STK-MED ONCE IV ;  Start 4/6/20 at 11:07;  Stop 

4/6/20 at 11:07;  Status DC


Cellulose (Surgicel Hemostat 4x8) 1 each STK-MED ONCE .ROUTE  Last administered 

on 4/6/20at 11:44;  Start 4/6/20 at 11:55;  Stop 4/6/20 at 11:56;  Status DC


Sevoflurane (Ultane) 60 ml STK-MED ONCE IH ;  Start 4/6/20 at 12:46;  Stop 

4/6/20 at 12:46;  Status DC


Sodium Chloride 1,000 ml @  1,000 mls/hr Q1H PRN IV hypotension;  Start 4/6/20 

at 13:51;  Stop 4/6/20 at 19:50;  Status DC


Albumin Human 200 ml @  200 mls/hr 1X PRN  PRN IV Hypotension Last administered 

on 4/6/20at 14:51;  Start 4/6/20 at 14:00;  Stop 4/6/20 at 19:59;  Status DC


Diphenhydramine HCl (Benadryl) 25 mg 1X PRN  PRN IV ITCHING;  Start 4/6/20 at 

14:00;  Stop 4/7/20 at 13:59;  Status DC


Diphenhydramine HCl (Benadryl) 25 mg 1X PRN  PRN IV ITCHING;  Start 4/6/20 at 

14:00;  Stop 4/7/20 at 13:59;  Status DC


Sodium Chloride 1,000 ml @  400 mls/hr Q2H30M PRN IV PATENCY;  Start 4/6/20 at 

13:51;  Stop 4/7/20 at 01:50;  Status DC


Info (PHARMACY MONITORING -- do not chart) 1 each PRN DAILY  PRN MC SEE 

COMMENTS;  Start 4/6/20 at 14:00;  Stop 4/9/20 at 08:16;  Status DC


Heparin Sodium (Porcine) (Hep Lock Adult) 500 unit STK-MED ONCE IVP ;  Start 

4/7/20 at 09:29;  Stop 4/7/20 at 09:30;  Status DC


Sodium Chloride 1,000 ml @  1,000 mls/hr Q1H PRN IV hypotension;  Start 4/7/20 

at 10:43;  Stop 4/7/20 at 16:42;  Status DC


Sodium Chloride 1,000 ml @  400 mls/hr Q2H30M PRN IV PATENCY;  Start 4/7/20 at 

10:43;  Stop 4/7/20 at 22:42;  Status DC


Info (PHARMACY MONITORING -- do not chart) 1 each PRN DAILY  PRN MC SEE 

COMMENTS;  Start 4/7/20 at 10:45;  Status UNV


Info (PHARMACY MONITORING -- do not chart) 1 each PRN DAILY  PRN MC SEE 

COMMENTS;  Start 4/7/20 at 10:45;  Status UNV


Sodium Chloride 90 meq/Potassium Chloride 15 meq/ Magnesium Sulfate 12 meq/Calci

um Gluconate 15 meq/ Multivitamins 10 ml/Chromium/ Copper/Manganese/ Seleni/Zn 

0.5 ml/ Insulin Human Regular 25 unit/ Total Parenteral Nutrition/Amino 

Acids/Dextrose/ Fat Emulsion Intravenous 1,400 ml @  58.333 mls/ hr TPN  CONT IV

 Last administered on 4/7/20at 22:13;  Start 4/7/20 at 22:00;  Stop 4/8/20 at 

21:59;  Status DC


Sodium Chloride 1,000 ml @  1,000 mls/hr Q1H PRN IV hypotension;  Start 4/8/20 

at 07:50;  Stop 4/8/20 at 13:49;  Status DC


Albumin Human 200 ml @  200 mls/hr 1X  ONCE IV ;  Start 4/8/20 at 08:00;  Stop 

4/8/20 at 08:53;  Status DC


Diphenhydramine HCl (Benadryl) 25 mg 1X PRN  PRN IV ITCHING;  Start 4/8/20 at 

08:00;  Stop 4/9/20 at 07:59;  Status DC


Diphenhydramine HCl (Benadryl) 25 mg 1X PRN  PRN IV ITCHING;  Start 4/8/20 at 

08:00;  Stop 4/9/20 at 07:59;  Status DC


Info (PHARMACY MONITORING -- do not chart) 1 each PRN DAILY  PRN MC SEE 

COMMENTS;  Start 4/8/20 at 08:00;  Stop 4/9/20 at 08:16;  Status DC


Albumin Human 50 ml @ 50 mls/hr 1X  ONCE IV ;  Start 4/8/20 at 08:53;  Stop 

4/8/20 at 08:56;  Status DC


Albumin Human 200 ml @  50 mls/hr PRN 1X  PRN IV HYPOTENSION Last administered 

on 4/14/20at 11:54;  Start 4/8/20 at 09:00;  Stop 5/21/20 at 11:14;  Status DC


Meropenem 500 mg/ Sodium Chloride 50 ml @  100 mls/hr Q12H IV  Last administered

on 4/28/20at 10:45;  Start 4/8/20 at 10:00;  Stop 4/28/20 at 12:37;  Status DC


Sodium Chloride 90 meq/Magnesium Sulfate 12 meq/ Calcium Gluconate 15 meq/ 

Multivitamins 10 ml/Chromium/ Copper/Manganese/ Seleni/Zn 0.5 ml/ Insulin Human 

Regular 25 unit/ Total Parenteral Nutrition/Amino Acids/Dextrose/ Fat Emulsion 

Intravenous 1,400 ml @  58.333 mls/ hr TPN  CONT IV  Last administered on 

4/8/20at 21:41;  Start 4/8/20 at 22:00;  Stop 4/9/20 at 21:59;  Status DC


Sodium Chloride 1,000 ml @  1,000 mls/hr Q1H PRN IV hypotension;  Start 4/9/20 

at 07:58;  Stop 4/9/20 at 13:57;  Status DC


Albumin Human 200 ml @  200 mls/hr 1X PRN  PRN IV Hypotension Last administered 

on 4/9/20at 09:30;  Start 4/9/20 at 08:00;  Stop 4/9/20 at 13:59;  Status DC


Sodium Chloride 1,000 ml @  400 mls/hr Q2H30M PRN IV PATENCY;  Start 4/9/20 at 

07:58;  Stop 4/9/20 at 19:57;  Status DC


Info (PHARMACY MONITORING -- do not chart) 1 each PRN DAILY  PRN MC SEE 

COMMENTS;  Start 4/9/20 at 08:00;  Status Cancel


Info (PHARMACY MONITORING -- do not chart) 1 each PRN DAILY  PRN MC SEE 

COMMENTS;  Start 4/9/20 at 08:15;  Status UNV


Sodium Chloride 90 meq/Potassium Phosphate 5 mmol/ Magnesium Sulfate 12 

meq/Calcium Gluconate 15 meq/ Multivitamins 10 ml/Chromium/ Copper/Manganese/ 

Seleni/Zn 0.5 ml/ Insulin Human Regular 30 unit/ Total Parenteral 

Nutrition/Amino Acids/Dextrose/ Fat Emulsion Intravenous 1,400 ml @  58.333 mls/

hr TPN  CONT IV  Last administered on 4/9/20at 22:08;  Start 4/9/20 at 22:00;  

Stop 4/10/20 at 21:59;  Status DC


Linezolid/Dextrose 300 ml @  300 mls/hr Q12HR IV  Last administered on 4/20/20at

20:40;  Start 4/10/20 at 11:00;  Stop 4/21/20 at 08:10;  Status DC


Sodium Chloride 90 meq/Potassium Phosphate 15 mmol/ Magnesium Sulfate 12 

meq/Calcium Gluconate 15 meq/ Multivitamins 10 ml/Chromium/ Copper/Manganese/ 

Seleni/Zn 0.5 ml/ Insulin Human Regular 30 unit/ Total Parenteral 

Nutrition/Amino Acids/Dextrose/ Fat Emulsion Intravenous 1,400 ml @  58.333 mls/

hr TPN  CONT IV  Last administered on 4/10/20at 21:49;  Start 4/10/20 at 22:00; 

Stop 4/11/20 at 21:59;  Status DC


Sodium Chloride 90 meq/Potassium Phosphate 15 mmol/ Magnesium Sulfate 12 

meq/Calcium Gluconate 15 meq/ Multivitamins 10 ml/Chromium/ Copper/Manganese/ 

Seleni/Zn 0.5 ml/ Insulin Human Regular 40 unit/ Total Parenteral 

Nutrition/Amino Acids/Dextrose/ Fat Emulsion Intravenous 1,400 ml @  58.333 mls/

hr TPN  CONT IV  Last administered on 4/11/20at 21:21;  Start 4/11/20 at 22:00; 

Stop 4/12/20 at 21:59;  Status DC


Sodium Chloride 1,000 ml @  1,000 mls/hr Q1H PRN IV hypotension;  Start 4/11/20 

at 13:26;  Stop 4/11/20 at 19:25;  Status DC


Albumin Human 200 ml @  200 mls/hr 1X PRN  PRN IV Hypotension Last administered 

on 4/11/20at 15:00;  Start 4/11/20 at 13:30;  Stop 4/11/20 at 19:29;  Status DC


Sodium Chloride (Normal Saline Flush) 10 ml 1X PRN  PRN IV AP catheter pack;  

Start 4/11/20 at 13:30;  Stop 4/12/20 at 13:29;  Status DC


Sodium Chloride (Normal Saline Flush) 10 ml 1X PRN  PRN IV  catheter pack;  

Start 4/11/20 at 13:30;  Stop 4/12/20 at 13:29;  Status DC


Sodium Chloride 1,000 ml @  400 mls/hr Q2H30M PRN IV PATENCY;  Start 4/11/20 at 

13:26;  Stop 4/12/20 at 01:25;  Status DC


Info (PHARMACY MONITORING -- do not chart) 1 each PRN DAILY  PRN MC SEE 

COMMENTS;  Start 4/11/20 at 13:30;  Stop 4/11/20 at 13:33;  Status DC


Info (PHARMACY MONITORING -- do not chart) 1 each PRN DAILY  PRN MC SEE 

COMMENTS;  Start 4/11/20 at 13:30;  Stop 4/11/20 at 13:34;  Status DC


Sodium Chloride 90 meq/Potassium Phosphate 19 mmol/ Magnesium Sulfate 12 

meq/Calcium Gluconate 15 meq/ Multivitamins 10 ml/Chromium/ Copper/Manganese/ 

Seleni/Zn 0.5 ml/ Insulin Human Regular 40 unit/ Total Parenteral 

Nutrition/Amino Acids/Dextrose/ Fat Emulsion Intravenous 1,400 ml @  58.333 mls/

hr TPN  CONT IV  Last administered on 4/12/20at 21:54;  Start 4/12/20 at 22:00; 

Stop 4/13/20 at 21:59;  Status DC


Sodium Chloride 1,000 ml @  1,000 mls/hr Q1H PRN IV hypotension;  Start 4/13/20 

at 09:35;  Stop 4/13/20 at 15:34;  Status DC


Albumin Human 200 ml @  200 mls/hr 1X PRN  PRN IV Hypotension;  Start 4/13/20 at

09:45;  Stop 4/13/20 at 15:44;  Status DC


Diphenhydramine HCl (Benadryl) 25 mg 1X PRN  PRN IV ITCHING;  Start 4/13/20 at 

09:45;  Stop 4/14/20 at 09:44;  Status DC


Diphenhydramine HCl (Benadryl) 25 mg 1X PRN  PRN IV ITCHING;  Start 4/13/20 at 

09:45;  Stop 4/14/20 at 09:44;  Status DC


Sodium Chloride 1,000 ml @  400 mls/hr Q2H30M PRN IV PATENCY;  Start 4/13/20 at 

09:35;  Stop 4/13/20 at 21:34;  Status DC


Info (PHARMACY MONITORING -- do not chart) 1 each PRN DAILY  PRN MC SEE 

COMMENTS;  Start 4/13/20 at 09:45;  Status Cancel


Sodium Chloride 100 meq/Potassium Phosphate 19 mmol/ Magnesium Sulfate 12 

meq/Calcium Gluconate 15 meq/ Multivitamins 10 ml/Chromium/ Copper/Manganese/ 

Seleni/Zn 0.5 ml/ Insulin Human Regular 40 unit/ Potassium Chloride 20 meq/ 

Total Parenteral Nutrition/Amino Acids/Dextrose/ Fat Emulsion Intravenous 1,400 

ml @  58.333 mls/ hr TPN  CONT IV  Last administered on 4/13/20at 22:02;  Start 

4/13/20 at 22:00;  Stop 4/14/20 at 21:59;  Status DC


Furosemide (Lasix) 40 mg 1X  ONCE IVP  Last administered on 4/13/20at 14:39;  

Start 4/13/20 at 14:30;  Stop 4/13/20 at 14:31;  Status DC


Metronidazole 100 ml @  100 mls/hr Q8HRS IV  Last administered on 4/21/20at 

06:04;  Start 4/14/20 at 10:00;  Stop 4/21/20 at 08:10;  Status DC


Sodium Chloride 1,000 ml @  1,000 mls/hr Q1H PRN IV hypotension;  Start 4/14/20 

at 08:00;  Stop 4/14/20 at 13:59;  Status DC


Albumin Human 200 ml @  200 mls/hr 1X PRN  PRN IV Hypotension;  Start 4/14/20 at

08:00;  Stop 4/14/20 at 13:59;  Status DC


Sodium Chloride 1,000 ml @  400 mls/hr Q2H30M PRN IV PATENCY;  Start 4/14/20 at 

08:00;  Stop 4/14/20 at 19:59;  Status DC


Info (PHARMACY MONITORING -- do not chart) 1 each PRN DAILY  PRN MC SEE CO

MMENTS;  Start 4/14/20 at 11:30;  Status UNV


Info (PHARMACY MONITORING -- do not chart) 1 each PRN DAILY  PRN MC SEE 

COMMENTS;  Start 4/14/20 at 11:30;  Stop 4/16/20 at 12:13;  Status DC


Sodium Chloride 100 meq/Potassium Phosphate 19 mmol/ Magnesium Sulfate 12 

meq/Calcium Gluconate 15 meq/ Multivitamins 10 ml/Chromium/ Copper/Manganese/ 

Seleni/Zn 0.5 ml/ Insulin Human Regular 40 unit/ Potassium Chloride 20 meq/ 

Total Parenteral Nutrition/Amino Acids/Dextrose/ Fat Emulsion Intravenous 1,400 

ml @  58.333 mls/ hr TPN  CONT IV  Last administered on 4/14/20at 21:52;  Start 

4/14/20 at 22:00;  Stop 4/15/20 at 21:59;  Status DC


Sodium Chloride (Normal Saline Flush) 10 ml QSHIFT  PRN IV AFTER MEDS AND BLOOD 

DRAWS;  Start 4/14/20 at 15:00;  Stop 5/12/20 at 11:27;  Status DC


Sodium Chloride (Normal Saline Flush) 10 ml PRN Q5MIN  PRN IV AFTER MEDS AND 

BLOOD DRAWS;  Start 4/14/20 at 15:00


Sodium Chloride (Normal Saline Flush) 20 ml PRN Q5MIN  PRN IV AFTER MEDS AND 

BLOOD DRAWS;  Start 4/14/20 at 15:00


Sodium Chloride 100 meq/Potassium Phosphate 19 mmol/ Magnesium Sulfate 12 

meq/Calcium Gluconate 15 meq/ Multivitamins 10 ml/Chromium/ Copper/Manganese/ 

Seleni/Zn 0.5 ml/ Insulin Human Regular 40 unit/ Potassium Chloride 20 meq/ 

Total Parenteral Nutrition/Amino Acids/Dextrose/ Fat Emulsion Intravenous 1,400 

ml @  58.333 mls/ hr TPN  CONT IV  Last administered on 4/15/20at 21:20;  Start 

4/15/20 at 22:00;  Stop 4/16/20 at 21:59;  Status DC


Lidocaine HCl (Buffered Lidocaine 1%) 3 ml STK-MED ONCE .ROUTE ;  Start 4/15/20 

at 13:16;  Stop 4/15/20 at 13:16;  Status DC


Lidocaine HCl (Buffered Lidocaine 1%) 6 ml 1X  ONCE INJ  Last administered on 

4/15/20at 13:45;  Start 4/15/20 at 13:30;  Stop 4/15/20 at 13:31;  Status DC


Albumin Human 100 ml @  100 mls/hr 1X  ONCE IV  Last administered on 4/15/20at 

15:41;  Start 4/15/20 at 15:00;  Stop 4/15/20 at 15:59;  Status DC


Albumin Human 50 ml @ 50 mls/hr 1X  ONCE IV  Last administered on 4/15/20at 

15:00;  Start 4/15/20 at 15:00;  Stop 4/15/20 at 15:59;  Status DC


Info (PHARMACY MONITORING -- do not chart) 1 each PRN DAILY  PRN MC SEE 

COMMENTS;  Start 4/16/20 at 11:30;  Status Cancel


Info (PHARMACY MONITORING -- do not chart) 1 each PRN DAILY  PRN MC SEE 

COMMENTS;  Start 4/16/20 at 11:30;  Status UNV


Sodium Chloride 100 meq/Potassium Phosphate 10 mmol/ Magnesium Sulfate 12 

meq/Calcium Gluconate 15 meq/ Multivitamins 10 ml/Chromium/ Copper/Manganese/ 

Seleni/Zn 0.5 ml/ Insulin Human Regular 35 unit/ Potassium Chloride 20 meq/ 

Total Parenteral Nutrition/Amino Acids/Dextrose/ Fat Emulsion Intravenous 1,400 

ml @  58.333 mls/ hr TPN  CONT IV  Last administered on 4/16/20at 22:10;  Start 

4/16/20 at 22:00;  Stop 4/17/20 at 21:59;  Status DC


Sodium Chloride 100 meq/Potassium Phosphate 5 mmol/ Magnesium Sulfate 12 

meq/Calcium Gluconate 15 meq/ Multivitamins 10 ml/Chromium/ Copper/Manganese/ 

Seleni/Zn 0.5 ml/ Insulin Human Regular 35 unit/ Potassium Chloride 20 meq/ 

Total Parenteral Nutrition/Amino Acids/Dextrose/ Fat Emulsion Intravenous 1,400 

ml @  58.333 mls/ hr TPN  CONT IV  Last administered on 4/17/20at 22:59;  Start 

4/17/20 at 22:00;  Stop 4/18/20 at 21:59;  Status DC


Sodium Chloride 1,000 ml @  1,000 mls/hr Q1H PRN IV hypotension;  Start 4/18/20 

at 08:27;  Stop 4/18/20 at 14:26;  Status DC


Albumin Human 200 ml @  200 mls/hr 1X PRN  PRN IV Hypotension Last administered 

on 4/18/20at 09:18;  Start 4/18/20 at 08:30;  Stop 4/18/20 at 14:29;  Status DC


Sodium Chloride 1,000 ml @  400 mls/hr Q2H30M PRN IV PATENCY;  Start 4/18/20 at 

08:27;  Stop 4/18/20 at 20:26;  Status DC


Info (PHARMACY MONITORING -- do not chart) 1 each PRN DAILY  PRN MC SEE 

COMMENTS;  Start 4/18/20 at 08:30;  Status Cancel


Info (PHARMACY MONITORING -- do not chart) 1 each PRN DAILY  PRN MC SEE 

COMMENTS;  Start 4/18/20 at 08:30;  Stop 4/26/20 at 13:10;  Status DC


Sodium Chloride 100 meq/Potassium Chloride 40 meq/ Magnesium Sulfate 15 

meq/Calcium Gluconate 15 meq/ Multivitamins 10 ml/Chromium/ Copper/Manganese/ 

Seleni/Zn 0.5 ml/ Insulin Human Regular 35 unit/ Total Parenteral 

Nutrition/Amino Acids/Dextrose/ Fat Emulsion Intravenous 1,400 ml @  58.333 mls/

hr TPN  CONT IV  Last administered on 4/18/20at 22:00;  Start 4/18/20 at 22:00; 

Stop 4/19/20 at 21:59;  Status DC


Potassium Chloride/Water 100 ml @  100 mls/hr 1X  ONCE IV  Last administered on 

4/18/20at 17:28;  Start 4/18/20 at 14:45;  Stop 4/18/20 at 15:44;  Status DC


Sodium Chloride 100 meq/Potassium Chloride 40 meq/ Magnesium Sulfate 15 

meq/Calcium Gluconate 15 meq/ Multivitamins 10 ml/Chromium/ Copper/Manganese/ 

Seleni/Zn 0.5 ml/ Insulin Human Regular 35 unit/ Total Parenteral 

Nutrition/Amino Acids/Dextrose/ Fat Emulsion Intravenous 1,400 ml @  58.333 mls/

hr TPN  CONT IV  Last administered on 4/19/20at 22:46;  Start 4/19/20 at 22:00; 

Stop 4/20/20 at 21:59;  Status DC


Sodium Chloride 100 meq/Potassium Chloride 40 meq/ Magnesium Sulfate 20 meq/C

alcium Gluconate 15 meq/ Multivitamins 10 ml/Chromium/ Copper/Manganese/ 

Seleni/Zn 0.5 ml/ Insulin Human Regular 35 unit/ Total Parenteral 

Nutrition/Amino Acids/Dextrose/ Fat Emulsion Intravenous 1,400 ml @  58.333 mls/

hr TPN  CONT IV  Last administered on 4/20/20at 22:31;  Start 4/20/20 at 22:00; 

Stop 4/21/20 at 21:59;  Status DC


Fentanyl Citrate (Fentanyl 2ml Vial) 50 mcg PRN Q2HR  PRN IVP PAIN Last 

administered on 4/27/20at 13:32;  Start 4/20/20 at 21:00;  Stop 4/28/20 at 

12:53;  Status DC


Fentanyl Citrate (Fentanyl 2ml Vial) 25 mcg PRN Q2HR  PRN IVP PAIN;  Start 

4/20/20 at 21:00;  Stop 4/28/20 at 12:54;  Status DC


Enoxaparin Sodium (Lovenox 100mg Syringe) 100 mg Q12HR SQ ;  Start 4/21/20 at 

21:00;  Status UNV


Amino Acids/ Glycerin/ Electrolytes 1,000 ml @  75 mls/hr [id number] IV ;  Start 

4/20/20 at 21:15;  Status UNV


Sodium Chloride 1,000 ml @  1,000 mls/hr Q1H PRN IV hypotension;  Start 4/21/20 

at 07:56;  Stop 4/21/20 at 13:55;  Status DC


Albumin Human 200 ml @  200 mls/hr 1X PRN  PRN IV Hypotension Last administered 

on 4/21/20at 08:40;  Start 4/21/20 at 08:00;  Stop 4/21/20 at 13:59;  Status DC


Sodium Chloride 1,000 ml @  400 mls/hr Q2H30M PRN IV PATENCY;  Start 4/21/20 at 

07:56;  Stop 4/21/20 at 19:55;  Status DC


Info (PHARMACY MONITORING -- do not chart) 1 each PRN DAILY  PRN MC SEE 

COMMENTS;  Start 4/21/20 at 08:00;  Status UNV


Info (PHARMACY MONITORING -- do not chart) 1 each PRN DAILY  PRN MC SEE 

COMMENTS;  Start 4/21/20 at 08:00;  Status UNV


Daptomycin 430 mg/ Sodium Chloride 50 ml @  100 mls/hr Q24H IV  Last 

administered on 4/21/20at 12:35;  Start 4/21/20 at 09:00;  Stop 4/21/20 at 

12:49;  Status DC


Sodium Chloride 100 meq/Potassium Chloride 40 meq/ Magnesium Sulfate 20 

meq/Calcium Gluconate 15 meq/ Multivitamins 10 ml/Chromium/ Copper/Manganese/ Se

aram/Zn 0.5 ml/ Insulin Human Regular 35 unit/ Total Parenteral Nutrition/Amino 

Acids/Dextrose/ Fat Emulsion Intravenous 1,400 ml @  58.333 mls/ hr TPN  CONT IV

 Last administered on 4/21/20at 21:26;  Start 4/21/20 at 22:00;  Stop 4/22/20 at

21:59;  Status DC


Daptomycin 430 mg/ Sodium Chloride 50 ml @  100 mls/hr Q48H IV ;  Start 4/23/20 

at 09:00;  Stop 4/22/20 at 11:55;  Status DC


Sodium Chloride 100 meq/Potassium Chloride 40 meq/ Magnesium Sulfate 20 

meq/Calcium Gluconate 15 meq/ Multivitamins 10 ml/Chromium/ Copper/Manganese/ 

Seleni/Zn 0.5 ml/ Insulin Human Regular 35 unit/ Total Parenteral 

Nutrition/Amino Acids/Dextrose/ Fat Emulsion Intravenous 1,400 ml @  58.333 mls/

hr TPN  CONT IV  Last administered on 4/22/20at 22:27;  Start 4/22/20 at 22:00; 

Stop 4/23/20 at 21:59;  Status DC


Daptomycin 430 mg/ Sodium Chloride 50 ml @  100 mls/hr Q24H IV  Last 

administered on 4/24/20at 15:07;  Start 4/22/20 at 13:00;  Stop 4/25/20 at 

13:15;  Status DC


Sodium Chloride 100 meq/Potassium Chloride 40 meq/ Magnesium Sulfate 20 

meq/Calcium Gluconate 10 meq/ Multivitamins 10 ml/Chromium/ Copper/Manganese/ 

Seleni/Zn 0.5 ml/ Insulin Human Regular 35 unit/ Total Parenteral 

Nutrition/Amino Acids/Dextrose/ Fat Emulsion Intravenous 1,400 ml @  58.333 mls/

hr TPN  CONT IV  Last administered on 4/24/20at 00:06;  Start 4/23/20 at 22:00; 

Stop 4/24/20 at 21:59;  Status DC


Alteplase, Recombinant (Cathflo For Central Catheter Clearance) 1 mg 1X  ONCE 

INT CAT  Last administered on 4/24/20at 11:44;  Start 4/24/20 at 10:45;  Stop 

4/24/20 at 10:46;  Status DC


Ondansetron HCl (Zofran) 4 mg PRN Q6HRS  PRN IV NAUSEA/VOMITING;  Start 4/27/20 

at 07:00;  Stop 4/28/20 at 06:59;  Status DC


Fentanyl Citrate (Fentanyl 2ml Vial) 25 mcg PRN Q5MIN  PRN IV MILD PAIN 1-3;  

Start 4/27/20 at 07:00;  Stop 4/28/20 at 06:59;  Status DC


Fentanyl Citrate (Fentanyl 2ml Vial) 50 mcg PRN Q5MIN  PRN IV MODERATE TO SEVERE

PAIN Last administered on 4/27/20at 10:17;  Start 4/27/20 at 07:00;  Stop 

4/28/20 at 06:59;  Status DC


Ringer's Solution 1,000 ml @  30 mls/hr Q24H IV ;  Start 4/27/20 at 07:00;  Stop

4/27/20 at 18:59;  Status DC


Lidocaine HCl (Xylocaine-Mpf 1% 2ml Vial) 2 ml PRN 1X  PRN ID PRIOR TO IV START;

 Start 4/27/20 at 07:00;  Stop 4/28/20 at 06:59;  Status DC


Prochlorperazine Edisylate (Compazine) 5 mg PACU PRN  PRN IV NAUSEA, MRX1;  

Start 4/27/20 at 07:00;  Stop 4/28/20 at 06:59;  Status DC


Sodium Acetate 50 meq/Potassium Acetate 55 meq/ Magnesium Sulfate 20 meq/Calcium

Gluconate 10 meq/ Multivitamins 10 ml/Chromium/ Copper/Manganese/ Seleni/Zn 0.5 

ml/ Insulin Human Regular 35 unit/ Total Parenteral Nutrition/Amino 

Acids/Dextrose/ Fat Emulsion Intravenous 1,400 ml @  58.333 mls/ hr TPN  CONT IV

;  Start 4/24/20 at 22:00;  Stop 4/24/20 at 14:15;  Status DC


Sodium Acetate 50 meq/Potassium Acetate 55 meq/ Magnesium Sulfate 20 meq/Calcium

Gluconate 10 meq/ Multivitamins 10 ml/Chromium/ Copper/Manganese/ Seleni/Zn 0.5 

ml/ Insulin Human Regular 35 unit/ Total Parenteral Nutrition/Amino 

Acids/Dextrose/ Fat Emulsion Intravenous 1,800 ml @  75 mls/hr TPN  CONT IV  

Last administered on 4/24/20at 22:38;  Start 4/24/20 at 22:00;  Stop 4/25/20 at 

21:59;  Status DC


Sodium Chloride 1,000 ml @  1,000 mls/hr Q1H PRN IV hypotension;  Start 4/24/20 

at 15:31;  Stop 4/24/20 at 21:30;  Status DC


Diphenhydramine HCl (Benadryl) 25 mg 1X PRN  PRN IV ITCHING;  Start 4/24/20 at 

15:45;  Stop 4/25/20 at 15:44;  Status DC


Diphenhydramine HCl (Benadryl) 25 mg 1X PRN  PRN IV ITCHING;  Start 4/24/20 at 

15:45;  Stop 4/25/20 at 15:44;  Status DC


Sodium Chloride 1,000 ml @  400 mls/hr Q2H30M PRN IV PATENCY;  Start 4/24/20 at 

15:31;  Stop 4/25/20 at 03:30;  Status DC


Info (PHARMACY MONITORING -- do not chart) 1 each PRN DAILY  PRN MC SEE 

COMMENTS;  Start 4/24/20 at 15:45;  Stop 5/26/20 at 14:14;  Status DC


Sodium Acetate 50 meq/Potassium Acetate 55 meq/ Magnesium Sulfate 20 meq/Calcium

Gluconate 10 meq/ Multivitamins 10 ml/Chromium/ Copper/Manganese/ Seleni/Zn 0.5 

ml/ Insulin Human Regular 35 unit/ Total Parenteral Nutrition/Amino 

Acids/Dextrose/ Fat Emulsion Intravenous 1,800 ml @  75 mls/hr TPN  CONT IV  

Last administered on 4/25/20at 22:03;  Start 4/25/20 at 22:00;  Stop 4/26/20 at 

21:59;  Status DC


Daptomycin 430 mg/ Sodium Chloride 50 ml @  100 mls/hr Q24H IV  Last 

administered on 4/30/20at 13:00;  Start 4/25/20 at 13:00;  Stop 4/30/20 at 

20:58;  Status DC


Heparin Sodium (Porcine) 1000 unit/Sodium Chloride 1,001 ml @  1,001 mls/hr 1X  

ONCE IRR ;  Start 4/27/20 at 06:00;  Stop 4/27/20 at 06:59;  Status DC


Potassium Acetate 55 meq/Magnesium Sulfate 20 meq/ Calcium Gluconate 10 meq/ 

Multivitamins 10 ml/Chromium/ Copper/Manganese/ Seleni/Zn 0.5 ml/ Insulin Human 

Regular 35 unit/ Total Parenteral Nutrition/Amino Acids/Dextrose/ Fat Emulsion 

Intravenous 1,920 ml @  80 mls/hr TPN  CONT IV  Last administered on 4/26/20at 

22:10;  Start 4/26/20 at 22:00;  Stop 4/27/20 at 21:59;  Status DC


Dexamethasone Sodium Phosphate (Decadron) 4 mg STK-MED ONCE .ROUTE ;  Start 

4/27/20 at 10:56;  Stop 4/27/20 at 10:57;  Status DC


Ondansetron HCl (Zofran) 4 mg STK-MED ONCE .ROUTE ;  Start 4/27/20 at 10:56;  

Stop 4/27/20 at 10:57;  Status DC


Rocuronium Bromide (Zemuron) 50 mg STK-MED ONCE .ROUTE ;  Start 4/27/20 at 

10:56;  Stop 4/27/20 at 10:57;  Status DC


Fentanyl Citrate (Fentanyl 2ml Vial) 100 mcg STK-MED ONCE .ROUTE ;  Start 

4/27/20 at 10:56;  Stop 4/27/20 at 10:57;  Status DC


Bupivacaine HCl/ Epinephrine Bitart (Sensorcain-Epi 0.5%-1:228684 Mpf) 30 ml 

STK-MED ONCE .ROUTE  Last administered on 4/27/20at 12:01;  Start 4/27/20 at 

10:58;  Stop 4/27/20 at 10:58;  Status DC


Cellulose (Surgicel Hemostat 2x14) 1 each STK-MED ONCE .ROUTE ;  Start 4/27/20 

at 10:58;  Stop 4/27/20 at 10:59;  Status DC


Iohexol (Omnipaque 300 Mg/ml) 50 ml STK-MED ONCE .ROUTE ;  Start 4/27/20 at 

10:58;  Stop 4/27/20 at 10:59;  Status DC


Cellulose (Surgicel Hemostat 4x8) 1 each STK-MED ONCE .ROUTE ;  Start 4/27/20 at

10:58;  Stop 4/27/20 at 10:59;  Status DC


Bisacodyl (Dulcolax Supp) 10 mg STK-MED ONCE .ROUTE ;  Start 4/27/20 at 10:59;  

Stop 4/27/20 at 10:59;  Status DC


Heparin Sodium (Porcine) 1000 unit/Sodium Chloride 1,001 ml @  1,001 mls/hr 1X  

ONCE IRR ;  Start 4/27/20 at 12:00;  Stop 4/27/20 at 12:59;  Status DC


Propofol 20 ml @ As Directed STK-MED ONCE IV ;  Start 4/27/20 at 11:05;  Stop 

4/27/20 at 11:05;  Status DC


Sevoflurane (Ultane) 90 ml STK-MED ONCE IH ;  Start 4/27/20 at 11:05;  Stop 

4/27/20 at 11:05;  Status DC


Sevoflurane (Ultane) 60 ml STK-MED ONCE IH ;  Start 4/27/20 at 12:26;  Stop 

4/27/20 at 12:27;  Status DC


Propofol 20 ml @ As Directed STK-MED ONCE IV ;  Start 4/27/20 at 12:26;  Stop 

4/27/20 at 12:27;  Status DC


Phenylephrine HCl (PHENYLEPHRINE in 0.9% NACL PF) 1 mg STK-MED ONCE IV ;  Start 

4/27/20 at 12:34;  Stop 4/27/20 at 12:34;  Status DC


Heparin Sodium (Porcine) (Heparin Sodium) 5,000 unit Q12HR SQ  Last administered

on 5/6/20at 20:57;  Start 4/27/20 at 21:00;  Stop 5/7/20 at 09:59;  Status DC


Sodium Chloride (Normal Saline Flush) 3 ml QSHIFT  PRN IV AFTER MEDS AND BLOOD 

DRAWS;  Start 4/27/20 at 13:45;  Status Cancel


Naloxone HCl (Narcan) 0.4 mg PRN Q2MIN  PRN IV SEE INSTRUCTIONS Last 

administered on 6/6/20at 15:15;  Start 4/27/20 at 13:45;  Stop 7/1/20 at 16:00; 

Status DC


Sodium Chloride 1,000 ml @  25 mls/hr Q24H IV  Last administered on 5/26/20at 

13:37;  Start 4/27/20 at 13:37;  Stop 5/29/20 at 13:09;  Status DC


Naloxone HCl (Narcan) 0.4 mg PRN Q2MIN  PRN IV SEE INSTRUCTIONS;  Start 4/27/20 

at 14:30;  Status UNV


Sodium Chloride 1,000 ml @  25 mls/hr Q24H IV ;  Start 4/27/20 at 14:30;  Status

UNV


Hydromorphone HCl 30 ml @ 0 mls/hr CONT PRN  PRN IV PER PROTOCOL Last 

administered on 5/2/20at 16:08;  Start 4/27/20 at 14:30;  Stop 5/4/20 at 08:55; 

Status DC


Potassium Acetate 55 meq/Magnesium Sulfate 20 meq/ Calcium Gluconate 10 meq/ 

Multivitamins 10 ml/Chromium/ Copper/Manganese/ Seleni/Zn 0.5 ml/ Insulin Human 

Regular 35 unit/ Total Parenteral Nutrition/Amino Acids/Dextrose/ Fat Emulsion 

Intravenous 1,920 ml @  80 mls/hr TPN  CONT IV  Last administered on 4/27/20at 

22:01;  Start 4/27/20 at 22:00;  Stop 4/28/20 at 21:59;  Status DC


Bumetanide (Bumex) 2 mg [id number] IV  Last administered on 5/1/20at 13:50;  Start 

4/28/20 at 14:00;  Stop 5/2/20 at 14:10;  Status DC


Meropenem 1 gm/ Sodium Chloride 100 ml @  200 mls/hr Q8HRS IV  Last administered

on 5/22/20at 05:53;  Start 4/28/20 at 14:00;  Stop 5/22/20 at 09:31;  Status DC


Potassium Acetate 55 meq/Magnesium Sulfate 20 meq/ Calcium Gluconate 10 meq/ 

Multivitamins 10 ml/Chromium/ Copper/Manganese/ Seleni/Zn 0.5 ml/ Insulin Human 

Regular 35 unit/ Total Parenteral Nutrition/Amino Acids/Dextrose/ Fat Emulsion 

Intravenous 1,920 ml @  80 mls/hr TPN  CONT IV  Last administered on 4/28/20at 

22:02;  Start 4/28/20 at 22:00;  Stop 4/29/20 at 21:59;  Status DC


Hydromorphone HCl (Dilaudid Standard PCA) 12 mg STK-MED ONCE IV ;  Start 4/27/20

at 14:35;  Stop 4/28/20 at 13:53;  Status DC


Artificial Tears (Artificial Tears) 1 drop PRN Q15MIN  PRN OU DRY EYE Last admi

nistered on 6/23/20at 21:17;  Start 4/29/20 at 05:30


Hydromorphone HCl (Dilaudid Standard PCA) 12 mg STK-MED ONCE IV ;  Start 4/28/20

at 12:05;  Stop 4/29/20 at 09:15;  Status DC


Potassium Acetate 65 meq/Magnesium Sulfate 20 meq/ Calcium Gluconate 10 meq/ 

Multivitamins 10 ml/Chromium/ Copper/Manganese/ Seleni/Zn 0.5 ml/ Insulin Human 

Regular 30 unit/ Total Parenteral Nutrition/Amino Acids/Dextrose/ Fat Emulsion 

Intravenous 1,920 ml @  80 mls/hr TPN  CONT IV  Last administered on 4/29/20at 

22:22;  Start 4/29/20 at 22:00;  Stop 4/30/20 at 21:59;  Status DC


Cyclobenzaprine HCl (Flexeril) 10 mg PRN Q6HRS  PRN PO MUSCLE SPASMS Last admin

istered on 7/10/20at 08:31;  Start 4/30/20 at 10:45


Potassium Acetate 55 meq/Magnesium Sulfate 20 meq/ Calcium Gluconate 10 meq/ 

Multivitamins 10 ml/Chromium/ Copper/Manganese/ Seleni/Zn 0.5 ml/ Insulin Human 

Regular 30 unit/ Total Parenteral Nutrition/Amino Acids/Dextrose/ Fat Emulsion 

Intravenous 1,920 ml @  80 mls/hr TPN  CONT IV  Last administered on 5/1/20at 

01:00;  Start 4/30/20 at 22:00;  Stop 5/1/20 at 21:59;  Status DC


Magnesium Sulfate 50 ml @ 25 mls/hr 1X  ONCE IV  Last administered on 4/30/20at 

17:18;  Start 4/30/20 at 12:45;  Stop 4/30/20 at 14:44;  Status DC


Potassium Chloride/Water 100 ml @  100 mls/hr 1X  ONCE IV  Last administered on 

5/1/20at 11:27;  Start 5/1/20 at 12:00;  Stop 5/1/20 at 12:59;  Status DC


Hydromorphone HCl (Dilaudid Standard PCA) 12 mg STK-MED ONCE IV ;  Start 4/29/20

at 10:50;  Stop 5/1/20 at 11:02;  Status DC


Hydromorphone HCl (Dilaudid Standard PCA) 12 mg STK-MED ONCE IV ;  Start 4/30/20

at 13:47;  Stop 5/1/20 at 11:03;  Status DC


Potassium Acetate 30 meq/Magnesium Sulfate 20 meq/ Calcium Gluconate 10 meq/ 

Multivitamins 10 ml/Chromium/ Copper/Manganese/ Seleni/Zn 0.5 ml/ Insulin Human 

Regular 30 unit/ Potassium Chloride 30 meq/ Total Parenteral Nutrition/Amino 

Acids/Dextrose/ Fat Emulsion Intravenous 1,920 ml @  80 mls/hr TPN  CONT IV  

Last administered on 5/1/20at 22:34;  Start 5/1/20 at 22:00;  Stop 5/2/20 at 

21:59;  Status DC


Potassium Chloride/Water 100 ml @  100 mls/hr Q1H IV  Last administered on 

5/2/20at 13:05;  Start 5/2/20 at 07:00;  Stop 5/2/20 at 10:59;  Status DC


Magnesium Sulfate 50 ml @ 25 mls/hr 1X  ONCE IV  Last administered on 5/2/20at 

10:34;  Start 5/2/20 at 10:30;  Stop 5/2/20 at 12:29;  Status DC


Potassium Chloride 75 meq/ Magnesium Sulfate 20 meq/Calcium Gluconate 10 meq/ 

Multivitamins 10 ml/Chromium/ Copper/Manganese/ Seleni/Zn 0.5 ml/ Insulin Human 

Regular 30 unit/ Total Parenteral Nutrition/Amino Acids/Dextrose/ Fat Emulsion I

ntravenous 1,920 ml @  80 mls/hr TPN  CONT IV  Last administered on 5/2/20at 

21:51;  Start 5/2/20 at 22:00;  Stop 5/3/20 at 22:00;  Status DC


Potassium Chloride 75 meq/ Magnesium Sulfate 20 meq/Calcium Gluconate 10 meq/ 

Multivitamins 10 ml/Chromium/ Copper/Manganese/ Seleni/Zn 0.5 ml/ Insulin Human 

Regular 25 unit/ Total Parenteral Nutrition/Amino Acids/Dextrose/ Fat Emulsion 

Intravenous 1,920 ml @  80 mls/hr TPN  CONT IV  Last administered on 5/3/20at 

22:04;  Start 5/3/20 at 22:00;  Stop 5/4/20 at 21:59;  Status DC


Hydromorphone HCl (Dilaudid) 0.4 mg PRN Q4HRS  PRN IVP PAIN Last administered on

5/4/20at 10:57;  Start 5/4/20 at 09:00;  Stop 5/4/20 at 18:59;  Status DC


Micafungin Sodium 100 mg/Dextrose 100 ml @  100 mls/hr Q24H IV  Last 

administered on 5/26/20at 12:17;  Start 5/4/20 at 11:00;  Stop 5/27/20 at 09:59;

 Status DC


Daptomycin 485 mg/ Sodium Chloride 50 ml @  100 mls/hr Q24H IV  Last 

administered on 5/11/20at 13:10;  Start 5/4/20 at 11:00;  Stop 5/12/20 at 07:44;

 Status DC


Potassium Chloride 75 meq/ Magnesium Sulfate 15 meq/Calcium Gluconate 8 meq/ 

Multivitamins 10 ml/Chromium/ Copper/Manganese/ Seleni/Zn 0.5 ml/ Insulin Human 

Regular 25 unit/ Total Parenteral Nutrition/Amino Acids/Dextrose/ Fat Emulsion 

Intravenous 1,920 ml @  80 mls/hr TPN  CONT IV  Last administered on 5/4/20at 

23:08;  Start 5/4/20 at 22:00;  Stop 5/5/20 at 21:59;  Status DC


Haloperidol Lactate (Haldol Inj) 3 mg 1X  ONCE IVP  Last administered on 

5/4/20at 14:37;  Start 5/4/20 at 14:30;  Stop 5/4/20 at 14:31;  Status DC


Hydromorphone HCl (Dilaudid) 1 mg PRN Q4HRS  PRN IVP PAIN Last administered on 

5/18/20at 06:25;  Start 5/4/20 at 19:00;  Stop 5/18/20 at 17:10;  Status DC


Potassium Chloride 75 meq/ Magnesium Sulfate 15 meq/Calcium Gluconate 8 meq/ 

Multivitamins 10 ml/Chromium/ Copper/Manganese/ Seleni/Zn 0.5 ml/ Insulin Human 

Regular 20 unit/ Total Parenteral Nutrition/Amino Acids/Dextrose/ Fat Emulsion 

Intravenous 1,920 ml @  80 mls/hr TPN  CONT IV  Last administered on 5/5/20at 

22:10;  Start 5/5/20 at 22:00;  Stop 5/6/20 at 21:59;  Status DC


Lidocaine HCl (Buffered Lidocaine 1%) 3 ml STK-MED ONCE .ROUTE ;  Start 5/6/20 

at 11:31;  Stop 5/6/20 at 11:31;  Status DC


Lidocaine HCl (Buffered Lidocaine 1%) 3 ml STK-MED ONCE .ROUTE ;  Start 5/6/20 

at 12:28;  Stop 5/6/20 at 12:29;  Status DC


Lidocaine HCl (Buffered Lidocaine 1%) 6 ml 1X  ONCE INJ  Last administered on 

5/6/20at 12:53;  Start 5/6/20 at 12:45;  Stop 5/6/20 at 12:46;  Status DC


Potassium Chloride 75 meq/ Magnesium Sulfate 15 meq/Calcium Gluconate 8 meq/ 

Multivitamins 10 ml/Chromium/ Copper/Manganese/ Seleni/Zn 0.5 ml/ Insulin Human 

Regular 20 unit/ Total Parenteral Nutrition/Amino Acids/Dextrose/ Fat Emulsion 

Intravenous 1,920 ml @  80 mls/hr TPN  CONT IV  Last administered on 5/6/20at 

22:00;  Start 5/6/20 at 22:00;  Stop 5/7/20 at 21:59;  Status DC


Potassium Chloride 75 meq/ Magnesium Sulfate 15 meq/Calcium Gluconate 8 meq/ 

Multivitamins 10 ml/Chromium/ Copper/Manganese/ Seleni/Zn 0.5 ml/ Insulin Human 

Regular 15 unit/ Total Parenteral Nutrition/Amino Acids/Dextrose/ Fat Emulsion 

Intravenous 1,920 ml @  80 mls/hr TPN  CONT IV  Last administered on 5/7/20at 

22:28;  Start 5/7/20 at 22:00;  Stop 5/8/20 at 21:59;  Status DC


Vecuronium Bromide (Norcuron Bolus) 6 mg PRN Q6HRS  PRN IV VENT ASYNCHRONY;  

Start 5/7/20 at 19:15;  Stop 5/7/20 at 19:35;  Status DC


Bumetanide (Bumex) 2 mg 1X  ONCE IV  Last administered on 5/7/20at 22:09;  Start

5/7/20 at 19:45;  Stop 5/7/20 at 19:46;  Status DC


Lidocaine HCl (Buffered Lidocaine 1%) 3 ml STK-MED ONCE .ROUTE ;  Start 5/8/20 

at 07:59;  Stop 5/8/20 at 07:59;  Status DC


Midazolam HCl (Versed) 5 mg STK-MED ONCE .ROUTE ;  Start 5/8/20 at 08:36;  Stop 

5/8/20 at 08:36;  Status DC


Fentanyl Citrate (Fentanyl 5ml Vial) 250 mcg STK-MED ONCE .ROUTE ;  Start 5/8/20

at 08:36;  Stop 5/8/20 at 08:37;  Status DC


Lidocaine HCl (Buffered Lidocaine 1%) 3 ml 1X  ONCE IJ  Last administered on 

5/8/20at 09:30;  Start 5/8/20 at 09:15;  Stop 5/8/20 at 09:16;  Status DC


Midazolam HCl (Versed) 5 mg 1X  ONCE IV  Last administered on 5/8/20at 09:30;  

Start 5/8/20 at 09:15;  Stop 5/8/20 at 09:16;  Status DC


Fentanyl Citrate (Fentanyl 5ml Vial) 250 mcg 1X  ONCE IV  Last administered on 

5/8/20at 09:30;  Start 5/8/20 at 09:15;  Stop 5/8/20 at 09:16;  Status DC


Bumetanide (Bumex) 2 mg DAILY IV  Last administered on 5/18/20at 08:07;  Start 

5/8/20 at 10:00;  Stop 5/18/20 at 17:15;  Status DC


Potassium Chloride 75 meq/ Magnesium Sulfate 15 meq/ Multivitamins 10 ml

/Chromium/ Copper/Manganese/ Seleni/Zn 0.5 ml/ Insulin Human Regular 15 unit/ 

Total Parenteral Nutrition/Amino Acids/Dextrose/ Fat Emulsion Intravenous 1,920 

ml @  80 mls/hr TPN  CONT IV  Last administered on 5/8/20at 21:59;  Start 5/8/20

at 22:00;  Stop 5/9/20 at 21:59;  Status DC


Metoclopramide HCl (Reglan Vial) 10 mg PRN Q3HRS  PRN IVP NAUSEA/VOMITING-3rd 

choice Last administered on 5/14/20at 04:25;  Start 5/9/20 at 16:45


Potassium Chloride 75 meq/ Magnesium Sulfate 15 meq/ Multivitamins 10 

ml/Chromium/ Copper/Manganese/ Seleni/Zn 0.5 ml/ Insulin Human Regular 15 unit/ 

Total Parenteral Nutrition/Amino Acids/Dextrose/ Fat Emulsion Intravenous 1,920 

ml @  80 mls/hr TPN  CONT IV  Last administered on 5/9/20at 22:41;  Start 5/9/20

at 22:00;  Stop 5/10/20 at 21:59;  Status DC


Magnesium Sulfate 50 ml @ 25 mls/hr 1X  ONCE IV  Last administered on 5/10/20at 

10:44;  Start 5/10/20 at 09:00;  Stop 5/10/20 at 10:59;  Status DC


Potassium Chloride/Water 100 ml @  100 mls/hr 1X  ONCE IV  Last administered on 

5/10/20at 09:37;  Start 5/10/20 at 09:00;  Stop 5/10/20 at 09:59;  Status DC


Duloxetine HCl (Cymbalta) 30 mg DAILY PO  Last administered on 5/11/20at 09:48; 

Start 5/10/20 at 14:00;  Stop 5/13/20 at 10:25;  Status DC


Potassium Chloride 80 meq/ Magnesium Sulfate 20 meq/ Multivitamins 10 

ml/Chromium/ Copper/Manganese/ Seleni/Zn 0.5 ml/ Insulin Human Regular 15 unit/ 

Total Parenteral Nutrition/Amino Acids/Dextrose/ Fat Emulsion Intravenous 1,920 

ml @  80 mls/hr TPN  CONT IV  Last administered on 5/10/20at 21:42;  Start 5/

10/20 at 22:00;  Stop 5/11/20 at 21:59;  Status DC


Potassium Chloride 80 meq/ Magnesium Sulfate 20 meq/ Multivitamins 10 ml/

mium/ Copper/Manganese/ Seleni/Zn 0.5 ml/ Insulin Human Regular 15 unit/ Total 

Parenteral Nutrition/Amino Acids/Dextrose/ Fat Emulsion Intravenous 1,920 ml @  

80 mls/hr TPN  CONT IV  Last administered on 5/11/20at 22:20;  Start 5/11/20 at 

22:00;  Stop 5/12/20 at 21:59;  Status DC


Lidocaine HCl (Buffered Lidocaine 1%) 3 ml [place]-MED ONCE .ROUTE ;  Start 5/12/20 

at 09:54;  Stop 5/12/20 at 09:55;  Status DC


Hydromorphone HCl (Dilaudid Standard PCA) 12 mg STK-MED ONCE IV ;  Start 5/1/20 

at 15:50;  Stop 5/12/20 at 11:24;  Status DC


Potassium Chloride 80 meq/ Magnesium Sulfate 20 meq/ Multivitamins 10 

ml/Chromium/ Copper/Manganese/ Seleni/Zn 0.5 ml/ Insulin Human Regular 15 unit/ 

Total Parenteral Nutrition/Amino Acids/Dextrose/ Fat Emulsion Intravenous 1,920 

ml @  80 mls/hr TPN  CONT IV  Last administered on 5/12/20at 21:40;  Start 

5/12/20 at 22:00;  Stop 5/13/20 at 21:59;  Status DC


Lidocaine HCl (Buffered Lidocaine 1%) 6 ml 1X  ONCE INJ  Last administered on 

5/12/20at 14:15;  Start 5/12/20 at 14:15;  Stop 5/12/20 at 14:16;  Status DC


Potassium Chloride 80 meq/ Magnesium Sulfate 20 meq/ Multivitamins 10 

ml/Chromium/ Copper/Manganese/ Seleni/Zn 1 ml/ Insulin Human Regular 15 unit/ 

Total Parenteral Nutrition/Amino Acids/Dextrose/ Fat Emulsion Intravenous 1,920 

ml @  80 mls/hr TPN  CONT IV  Last administered on 5/13/20at 22:04;  Start 

5/13/20 at 22:00;  Stop 5/14/20 at 21:59;  Status DC


Potassium Chloride/Water 100 ml @  100 mls/hr 1X  ONCE IV  Last administered on 

5/14/20at 11:34;  Start 5/14/20 at 11:00;  Stop 5/14/20 at 11:59;  Status DC


Potassium Chloride 90 meq/ Magnesium Sulfate 20 meq/ Multivitamins 10 

ml/Chromium/ Copper/Manganese/ Seleni/Zn 1 ml/ Insulin Human Regular 15 unit/ 

Total Parenteral Nutrition/Amino Acids/Dextrose/ Fat Emulsion Intravenous 1,920 

ml @  80 mls/hr TPN  CONT IV  Last administered on 5/14/20at 22:57;  Start 

5/14/20 at 22:00;  Stop 5/15/20 at 21:59;  Status DC


Potassium Chloride 90 meq/ Magnesium Sulfate 20 meq/ Multivitamins 10 

ml/Chromium/ Copper/Manganese/ Seleni/Zn 1 ml/ Insulin Human Regular 15 unit/ 

Total Parenteral Nutrition/Amino Acids/Dextrose/ Fat Emulsion Intravenous 1,920 

ml @  80 mls/hr TPN  CONT IV  Last administered on 5/15/20at 22:48;  Start 

5/15/20 at 22:00;  Stop 5/16/20 at 21:59;  Status DC


Potassium Chloride 90 meq/ Magnesium Sulfate 20 meq/ Multivitamins 10 

ml/Chromium/ Copper/Manganese/ Seleni/Zn 1 ml/ Insulin Human Regular 15 unit/ 

Total Parenteral Nutrition/Amino Acids/Dextrose/ Fat Emulsion Intravenous 1,890 

ml @  78.75 mls/ hr TPN  CONT IV  Last administered on 5/16/20at 22:15;  Start 

5/16/20 at 22:00;  Stop 5/17/20 at 21:59;  Status DC


Linezolid/Dextrose 300 ml @  300 mls/hr Q12HR IV  Last administered on 5/19/20at

21:08;  Start 5/17/20 at 09:00;  Stop 5/20/20 at 08:11;  Status DC


Daptomycin 450 mg/ Sodium Chloride 50 ml @  100 mls/hr Q24H IV  Last 

administered on 5/20/20at 09:25;  Start 5/17/20 at 09:00;  Stop 5/21/20 at 

08:30;  Status DC


Potassium Chloride 90 meq/ Magnesium Sulfate 20 meq/ Multivitamins 10 

ml/Chromium/ Copper/Manganese/ Seleni/Zn 1 ml/ Insulin Human Regular 15 unit/ 

Total Parenteral Nutrition/Amino Acids/Dextrose/ Fat Emulsion Intravenous 1,890 

ml @  78.75 mls/ hr TPN  CONT IV  Last administered on 5/17/20at 21:34;  Start 

5/17/20 at 22:00;  Stop 5/18/20 at 21:59;  Status DC


Lorazepam (Ativan Inj) 2 mg STK-MED ONCE .ROUTE ;  Start 5/17/20 at 14:58;  Stop

5/17/20 at 14:58;  Status DC


Metoprolol Tartrate (Lopressor Vial) 5 mg 1X  ONCE IVP  Last administered on 

5/17/20at 15:31;  Start 5/17/20 at 15:15;  Stop 5/17/20 at 15:16;  Status DC


Lorazepam (Ativan Inj) 2 mg 1X  ONCE IVP  Last administered on 5/17/20at 15:30; 

Start 5/17/20 at 15:15;  Stop 5/17/20 at 15:16;  Status DC


Enoxaparin Sodium (Lovenox 40mg Syringe) 40 mg Q24H SQ  Last administered on 

6/5/20at 17:44;  Start 5/17/20 at 17:00;  Stop 6/7/20 at 06:50;  Status DC


Lorazepam (Ativan Inj) 1 mg PRN Q4HRS  PRN IVP ANXIETY / AGITATION MILD-MOD Last

administered on 5/31/20at 15:55;  Start 5/17/20 at 19:15;  Stop 6/2/20 at 11:45;

 Status DC


Lorazepam (Ativan Inj) 2 mg PRN Q4HRS  PRN IVP ANXIETY / AGITATION SEVERE Last 

administered on 6/1/20at 07:55;  Start 5/17/20 at 19:15;  Stop 6/2/20 at 11:45; 

Status DC


Fentanyl Citrate (Fentanyl 2ml Vial) 50 mcg PRN Q4HRS  PRN IVP SEVERE PAIN Last 

administered on 6/13/20at 05:15;  Start 5/18/20 at 13:15;  Stop 6/14/20 at 

09:29;  Status DC


Fentanyl Citrate (Fentanyl 2ml Vial) 25 mcg PRN Q4HRS  PRN IVP MODERATE PAIN 

Last administered on 6/13/20at 00:27;  Start 5/18/20 at 13:15;  Stop 6/14/20 at 

09:30;  Status DC


Potassium Chloride 90 meq/ Magnesium Sulfate 20 meq/ Multivitamins 10 

ml/Chromium/ Copper/Manganese/ Seleni/Zn 1 ml/ Insulin Human Regular 15 unit/ 

Total Parenteral Nutrition/Amino Acids/Dextrose/ Fat Emulsion Intravenous 1,890 

ml @  78.75 mls/ hr TPN  CONT IV  Last administered on 5/18/20at 22:18;  Start 

5/18/20 at 22:00;  Stop 5/19/20 at 21:59;  Status DC


Furosemide (Lasix) 40 mg 1X  ONCE IVP  Last administered on 5/18/20at 21:51;  

Start 5/18/20 at 21:45;  Stop 5/18/20 at 21:48;  Status DC


Albumin Human 100 ml @  100 mls/hr 1X PRN  PRN IV SEE COMMENTS;  Start 5/19/20 

at 01:30


Furosemide (Lasix) 40 mg [id number] IVP  Last administered on 6/3/20at 08:04;  Start 

5/19/20 at 14:00;  Stop 6/3/20 at 13:07;  Status DC


Potassium Chloride 90 meq/ Magnesium Sulfate 20 meq/ Multivitamins 10 

ml/Chromium/ Copper/Manganese/ Seleni/Zn 1 ml/ Insulin Human Regular 15 unit/ 

Total Parenteral Nutrition/Amino Acids/Dextrose/ Fat Emulsion Intravenous 1,800 

ml @  75 mls/hr TPN  CONT IV  Last administered on 5/19/20at 22:31;  Start 

5/19/20 at 22:00;  Stop 5/20/20 at 21:59;  Status DC


Potassium Chloride 90 meq/ Magnesium Sulfate 20 meq/ Multivitamins 10 

ml/Chromium/ Copper/Manganese/ Seleni/Zn 1 ml/ Insulin Human Regular 15 unit/ 

Total Parenteral Nutrition/Amino Acids/Dextrose/ Fat Emulsion Intravenous 1,800 

ml @  75 mls/hr TPN  CONT IV  Last administered on 5/20/20at 22:28;  Start 

5/20/20 at 22:00;  Stop 5/21/20 at 21:59;  Status DC


Potassium Chloride 110 meq/ Magnesium Sulfate 20 meq/ Multivitamins 10 

ml/Chromium/ Copper/Manganese/ Seleni/Zn 1 ml/ Insulin Human Regular 15 unit/ 

Total Parenteral Nutrition/Amino Acids/Dextrose/ Fat Emulsion Intravenous 1,800 

ml @  75 mls/hr TPN  CONT IV  Last administered on 5/21/20at 22:01;  Start 

5/21/20 at 22:00;  Stop 5/22/20 at 21:59;  Status DC


Saliva Substitute (Biotene Moisturizing Mouth) 2 spray PRN Q15MIN  PRN PO DRY 

MOUTH;  Start 5/21/20 at 11:00


Potassium Chloride 110 meq/ Magnesium Sulfate 20 meq/ Multivitamins 10 

ml/Chromium/ Copper/Manganese/ Seleni/Zn 1 ml/ Insulin Human Regular 15 unit/ 

Total Parenteral Nutrition/Amino Acids/Dextrose/ Fat Emulsion Intravenous 1,800 

ml @  75 mls/hr TPN  CONT IV  Last administered on 5/22/20at 22:21;  Start 

5/22/20 at 22:00;  Stop 5/23/20 at 21:59;  Status DC


Potassium Chloride 110 meq/ Magnesium Sulfate 20 meq/ Multivitamins 10 

ml/Chromium/ Copper/Manganese/ Seleni/Zn 1 ml/ Insulin Human Regular 15 unit/ 

Total Parenteral Nutrition/Amino Acids/Dextrose/ Fat Emulsion Intravenous 1,800 

ml @  75 mls/hr TPN  CONT IV  Last administered on 5/23/20at 22:04;  Start 

5/23/20 at 22:00;  Stop 5/24/20 at 21:59;  Status DC


Potassium Chloride 110 meq/ Magnesium Sulfate 20 meq/ Multivitamins 10 

ml/Chromium/ Copper/Manganese/ Seleni/Zn 1 ml/ Insulin Human Regular 15 unit/ 

Total Parenteral Nutrition/Amino Acids/Dextrose/ Fat Emulsion Intravenous 1,800 

ml @  75 mls/hr TPN  CONT IV  Last administered on 5/24/20at 22:48;  Start 

5/24/20 at 22:00;  Stop 5/25/20 at 21:59;  Status DC


Potassium Chloride 70 meq/ Magnesium Sulfate 20 meq/ Multivitamins 10 

ml/Chromium/ Copper/Manganese/ Seleni/Zn 1 ml/ Insulin Human Regular 15 unit/ 

Total Parenteral Nutrition/Amino Acids/Dextrose/ Fat Emulsion Intravenous 1,800 

ml @  75 mls/hr TPN  CONT IV  Last administered on 5/25/20at 21:39;  Start 

5/25/20 at 22:00;  Stop 5/26/20 at 21:59;  Status DC


Meropenem 500 mg/ Sodium Chloride 50 ml @  100 mls/hr Q6HRS IV  Last 

administered on 5/27/20at 06:02;  Start 5/25/20 at 18:00;  Stop 5/27/20 at 

09:59;  Status DC


Barium Sulfate (Varibar Thin Liquid Apple) 148 gm 1X  ONCE PO ;  Start 5/26/20 

at 11:45;  Stop 5/26/20 at 11:49;  Status DC


Potassium Chloride 70 meq/ Magnesium Sulfate 20 meq/ Multivitamins 10 

ml/Chromium/ Copper/Manganese/ Seleni/Zn 1 ml/ Insulin Human Regular 15 unit/ 

Total Parenteral Nutrition/Amino Acids/Dextrose/ Fat Emulsion Intravenous 1,800 

ml @  75 mls/hr TPN  CONT IV  Last administered on 5/26/20at 22:27;  Start 

5/26/20 at 22:00;  Stop 5/27/20 at 21:59;  Status DC


Piperacillin Sod/ Tazobactam Sod 3.375 gm/Sodium Chloride 50 ml @  100 mls/hr 

Q6HRS IV  Last administered on 6/4/20at 06:10;  Start 5/27/20 at 12:00;  Stop 

6/4/20 at 07:26;  Status DC


Potassium Chloride 70 meq/ Magnesium Sulfate 20 meq/ Multivitamins 10 

ml/Chromium/ Copper/Manganese/ Seleni/Zn 1 ml/ Insulin Human Regular 15 unit/ 

Total Parenteral Nutrition/Amino Acids/Dextrose/ Fat Emulsion Intravenous 1,800 

ml @  75 mls/hr TPN  CONT IV  Last administered on 5/27/20at 22:03;  Start 

5/27/20 at 22:00;  Stop 5/28/20 at 21:59;  Status DC


Potassium Chloride 70 meq/ Magnesium Sulfate 20 meq/ Multivitamins 10 

ml/Chromium/ Copper/Manganese/ Seleni/Zn 1 ml/ Insulin Human Regular 15 unit/ 

Total Parenteral Nutrition/Amino Acids/Dextrose/ Fat Emulsion Intravenous 1,800 

ml @  75 mls/hr TPN  CONT IV  Last administered on 5/28/20at 22:33;  Start 

5/28/20 at 22:00;  Stop 5/29/20 at 21:59;  Status DC


Potassium Chloride 70 meq/ Magnesium Sulfate 20 meq/ Multivitamins 10 

ml/Chromium/ Copper/Manganese/ Seleni/Zn 1 ml/ Insulin Human Regular 15 unit/ 

Total Parenteral Nutrition/Amino Acids/Dextrose/ Fat Emulsion Intravenous 1,800 

ml @  75 mls/hr TPN  CONT IV  Last administered on 5/29/20at 23:13;  Start 

5/29/20 at 22:00;  Stop 5/30/20 at 21:59;  Status DC


Potassium Chloride 80 meq/ Magnesium Sulfate 20 meq/ Multivitamins 10 

ml/Chromium/ Copper/Manganese/ Seleni/Zn 1 ml/ Insulin Human Regular 15 unit/ 

Total Parenteral Nutrition/Amino Acids/Dextrose/ Fat Emulsion Intravenous 1,800 

ml @  75 mls/hr TPN  CONT IV  Last administered on 5/30/20at 22:30;  Start 

5/30/20 at 22:00;  Stop 5/31/20 at 21:59;  Status DC


Potassium Chloride 80 meq/ Magnesium Sulfate 20 meq/ Multivitamins 10 

ml/Chromium/ Copper/Manganese/ Seleni/Zn 1 ml/ Insulin Human Regular 15 unit/ 

Total Parenteral Nutrition/Amino Acids/Dextrose/ Fat Emulsion Intravenous 1,800 

ml @  75 mls/hr TPN  CONT IV  Last administered on 5/31/20at 21:54;  Start 

5/31/20 at 22:00;  Stop 6/1/20 at 21:59;  Status DC


Potassium Chloride/Water 100 ml @  100 mls/hr 1X  ONCE IV  Last administered on 

6/1/20at 10:15;  Start 6/1/20 at 10:00;  Stop 6/1/20 at 10:59;  Status DC


Potassium Chloride 90 meq/ Magnesium Sulfate 20 meq/ Multivitamins 10 

ml/Chromium/ Copper/Manganese/ Seleni/Zn 1 ml/ Insulin Human Regular 20 unit/ 

Total Parenteral Nutrition/Amino Acids/Dextrose/ Fat Emulsion Intravenous 1,800 

ml @  75 mls/hr TPN  CONT IV  Last administered on 6/1/20at 22:28;  Start 6/1/20

at 22:00;  Stop 6/2/20 at 21:59;  Status DC


Potassium Chloride 90 meq/ Magnesium Sulfate 20 meq/ Multivitamins 10 

ml/Chromium/ Copper/Manganese/ Seleni/Zn 1 ml/ Insulin Human Regular 20 unit/ 

Total Parenteral Nutrition/Amino Acids/Dextrose/ Fat Emulsion Intravenous 1,800 

ml @  75 mls/hr TPN  CONT IV  Last administered on 6/2/20at 22:08;  Start 6/2/20

at 22:00;  Stop 6/3/20 at 21:59;  Status DC


Lorazepam (Ativan Inj) 0.25 mg PRN Q4HRS  PRN IVP ANXIETY / AGITATION Last 

administered on 6/27/20at 13:37;  Start 6/3/20 at 07:30


Potassium Chloride 90 meq/ Magnesium Sulfate 20 meq/ Multivitamins 10 

ml/Chromium/ Copper/Manganese/ Seleni/Zn 1 ml/ Insulin Human Regular 20 unit/ 

Total Parenteral Nutrition/Amino Acids/Dextrose/ Fat Emulsion Intravenous 1,800 

ml @  75 mls/hr TPN  CONT IV  Last administered on 6/3/20at 23:13;  Start 6/3/20

at 22:00;  Stop 6/4/20 at 21:59;  Status DC


Furosemide (Lasix) 40 mg DAILY IVP  Last administered on 6/5/20at 11:14;  Start 

6/3/20 at 13:30;  Stop 6/7/20 at 09:12;  Status DC


Fluoxetine HCl (PROzac) 20 mg QHS PEG  Last administered on 7/9/20at 22:36;  

Start 6/4/20 at 21:00


Fentanyl (Duragesic 50mcg/ Hr Patch) 1 patch Q72H TD  Last administered on 

6/4/20at 21:22;  Start 6/4/20 at 21:00;  Stop 6/13/20 at 12:00;  Status DC


Potassium Chloride 40 meq/ Potassium Acetate 60 meq/Magnesium Sulfate 10 meq/ 

Multivitamins 10 ml/Chromium/ Copper/Manganese/ Seleni/Zn 1 ml/ Insulin Human 

Regular 20 unit/ Total Parenteral Nutrition/Amino Acids/Dextrose/ Fat Emulsion 

Intravenous 1,800 ml @  75 mls/hr TPN  CONT IV  Last administered on 6/5/20at 

00:03;  Start 6/4/20 at 22:00;  Stop 6/5/20 at 21:59;  Status DC


Potassium Acetate 80 meq/Magnesium Sulfate 5 meq/ Multivitamins 10 ml/Chromium/ 

Copper/Manganese/ Seleni/Zn 1 ml/ Insulin Human Regular 20 unit/ Total 

Parenteral Nutrition/Amino Acids/Dextrose/ Fat Emulsion Intravenous 1,920 ml @  

80 mls/hr TPN  CONT IV  Last administered on 6/5/20at 21:59;  Start 6/5/20 at 

22:00;  Stop 6/6/20 at 21:59;  Status DC


Potassium Acetate 60 meq/Magnesium Sulfate 5 meq/ Multivitamins 10 ml/Chromium/ 

Copper/Manganese/ Seleni/Zn 1 ml/ Insulin Human Regular 30 unit/ Total 

Parenteral Nutrition/Amino Acids/Dextrose/ Fat Emulsion Intravenous 1,920 ml @  

80 mls/hr TPN  CONT IV  Last administered on 6/6/20at 21:54;  Start 6/6/20 at 

22:00;  Stop 6/7/20 at 21:59;  Status DC


Norepinephrine Bitartrate 8 mg/ Dextrose 258 ml @  13.332 mls/ hr CONT  PRN IV 

PER PROTOCOL Last administered on 7/2/20at 09:09;  Start 6/7/20 at 06:30


Albumin Human 500 ml @  125 mls/hr 1X  ONCE IV  Last administered on 6/7/20at 

08:10;  Start 6/7/20 at 08:15;  Stop 6/7/20 at 12:14;  Status DC


Potassium Acetate 40 meq/Magnesium Sulfate 5 meq/ Multivitamins 10 ml/Chromium/ 

Copper/Manganese/ Seleni/Zn 1 ml/ Insulin Human Regular 30 unit/ Total 

Parenteral Nutrition/Amino Acids/Dextrose/ Fat Emulsion Intravenous 1,920 ml @  

80 mls/hr TPN  CONT IV  Last administered on 6/7/20at 22:23;  Start 6/7/20 at 

22:00;  Stop 6/8/20 at 21:59;  Status DC


Meropenem 1 gm/ Sodium Chloride 100 ml @  200 mls/hr Q8HRS IV ;  Start 6/7/20 at

14:00;  Status Cancel


Meropenem 1 gm/ Sodium Chloride 100 ml @  200 mls/hr Q8HRS IV  Last administered

on 6/7/20at 11:04;  Start 6/7/20 at 10:00;  Stop 6/7/20 at 13:00;  Status DC


Meropenem 1 gm/ Sodium Chloride 100 ml @  200 mls/hr Q12HR IV  Last administered

on 6/25/20at 08:27;  Start 6/7/20 at 21:00;  Stop 6/25/20 at 08:56;  Status DC


Sodium Chloride 1,000 ml @  1,000 mls/hr 1X  ONCE IV  Last administered on 

6/7/20at 11:06;  Start 6/7/20 at 10:45;  Stop 6/7/20 at 11:44;  Status DC


Micafungin Sodium 100 mg/Dextrose 100 ml @  100 mls/hr Q24H IV  Last 

administered on 6/24/20at 12:34;  Start 6/7/20 at 11:00;  Stop 6/25/20 at 08:56;

 Status DC


Daptomycin 410 mg/ Sodium Chloride 50 ml @  100 mls/hr Q24H IV  Last 

administered on 6/9/20at 13:33;  Start 6/7/20 at 14:00;  Stop 6/10/20 at 08:30; 

Status DC


Midazolam HCl (Versed) 2 mg STK-MED ONCE .ROUTE ;  Start 6/7/20 at 14:47;  Stop 

6/7/20 at 14:48;  Status DC


Fentanyl Citrate (Fentanyl 2ml Vial) 100 mcg STK-MED ONCE .ROUTE ;  Start 6/7/20

at 14:47;  Stop 6/7/20 at 14:48;  Status DC


Flumazenil (Romazicon) 0.5 mg STK-MED ONCE IV ;  Start 6/7/20 at 14:48;  Stop 

6/7/20 at 14:48;  Status DC


Naloxone HCl (Narcan) 0.4 mg STK-MED ONCE .ROUTE ;  Start 6/7/20 at 14:48;  Stop

6/7/20 at 14:48;  Status DC


Lidocaine HCl (Lidocaine 1% 20ml Vial) 20 ml STK-MED ONCE .ROUTE ;  Start 6/7/20

at 14:48;  Stop 6/7/20 at 14:48;  Status DC


Midazolam HCl (Versed) 2 mg 1X  ONCE IV  Last administered on 6/7/20at 15:28;  

Start 6/7/20 at 15:00;  Stop 6/7/20 at 15:01;  Status DC


Fentanyl Citrate (Fentanyl 2ml Vial) 100 mcg 1X  ONCE IV  Last administered on 

6/7/20at 15:28;  Start 6/7/20 at 15:00;  Stop 6/7/20 at 15:01;  Status DC


Lidocaine HCl (Lidocaine 1% 20ml Vial) 20 ml 1X  ONCE INJ  Last administered on 

6/7/20at 15:30;  Start 6/7/20 at 15:00;  Stop 6/7/20 at 15:01;  Status DC


Sodium Chloride 1,000 ml @  100 mls/hr Q10H IV  Last administered on 6/16/20at 

07:30;  Start 6/7/20 at 20:00;  Stop 6/16/20 at 11:26;  Status DC


Sodium Bicarbonate (Sodium Bicarb Adult 8.4% Syr) 50 meq 1X  ONCE IV  Last 

administered on 6/7/20at 21:47;  Start 6/7/20 at 22:00;  Stop 6/7/20 at 22:01;  

Status DC


Potassium Acetate 40 meq/Magnesium Sulfate 5 meq/ Multivitamins 10 ml/Chromium/ 

Copper/Manganese/ Seleni/Zn 1 ml/ Insulin Human Regular 30 unit/ Total 

Parenteral Nutrition/Amino Acids/Dextrose/ Fat Emulsion Intravenous 1,920 ml @  

80 mls/hr TPN  CONT IV  Last administered on 6/8/20at 22:28;  Start 6/8/20 at 

22:00;  Stop 6/9/20 at 21:59;  Status DC


Sodium Chloride 500 ml @  500 mls/hr 1X  ONCE IV  Last administered on 6/9/20at 

06:39;  Start 6/9/20 at 06:45;  Stop 6/9/20 at 07:44;  Status DC


Potassium Acetate 40 meq/Magnesium Sulfate 5 meq/ Multivitamins 10 ml/Chromium/ 

Copper/Manganese/ Seleni/Zn 1 ml/ Insulin Human Regular 30 unit/ Total 

Parenteral Nutrition/Amino Acids/Dextrose/ Fat Emulsion Intravenous 1,920 ml @  

80 mls/hr TPN  CONT IV  Last administered on 6/9/20at 22:03;  Start 6/9/20 at 

22:00;  Stop 6/10/20 at 21:59;  Status DC


Metoprolol Tartrate (Lopressor Vial) 5 mg PRN Q6HRS  PRN IVP HYPERTENSION Last 

administered on 7/10/20at 08:32;  Start 6/10/20 at 09:00


Potassium Acetate 40 meq/Magnesium Sulfate 5 meq/ Multivitamins 10 ml/Chromium/ 

Copper/Manganese/ Seleni/Zn 1 ml/ Insulin Human Regular 30 unit/ Total 

Parenteral Nutrition/Amino Acids/Dextrose/ Fat Emulsion Intravenous 1,920 ml @  

80 mls/hr TPN  CONT IV  Last administered on 6/10/20at 21:26;  Start 6/10/20 at 

22:00;  Stop 6/11/20 at 21:59;  Status DC


Potassium Acetate 40 meq/Magnesium Sulfate 5 meq/ Multivitamins 10 ml/Chromium/ 

Copper/Manganese/ Seleni/Zn 1 ml/ Insulin Human Regular 30 unit/ Total 

Parenteral Nutrition/Amino Acids/Dextrose/ Fat Emulsion Intravenous 1,920 ml @  

80 mls/hr TPN  CONT IV  Last administered on 6/11/20at 23:23;  Start 6/11/20 at 

22:00;  Stop 6/12/20 at 21:59;  Status DC


Potassium Acetate 40 meq/Magnesium Sulfate 5 meq/ Multivitamins 10 ml/Chromium/ 

Copper/Manganese/ Seleni/Zn 1 ml/ Insulin Human Regular 30 unit/ Total 

Parenteral Nutrition/Amino Acids/Dextrose/ Fat Emulsion Intravenous 1,920 ml @  

80 mls/hr TPN  CONT IV  Last administered on 6/12/20at 21:35;  Start 6/12/20 at 

22:00;  Stop 6/13/20 at 21:59;  Status DC


Furosemide (Lasix) 20 mg 1X  ONCE IVP  Last administered on 6/13/20at 06:26;  

Start 6/13/20 at 06:15;  Stop 6/13/20 at 06:16;  Status DC


Methylprednisolone Sodium Succinate (SOLU-Medrol 125MG VIAL) 125 mg 1X  ONCE IV 

Last administered on 6/13/20at 06:26;  Start 6/13/20 at 06:15;  Stop 6/13/20 at 

06:16;  Status DC


Albuterol/ Ipratropium (Duoneb) 3 ml Q4HRS NEB  Last administered on 7/10/20at 

07:27;  Start 6/13/20 at 08:00


Fentanyl Citrate 30 ml @ 0 mls/hr CONT  PRN IV SEE PROTOCOL Last administered on

7/4/20at 08:03;  Start 6/13/20 at 06:00;  Stop 7/4/20 at 12:42;  Status DC


Propofol 100 ml @ 0 mls/hr CONT  PRN IV SEE PROTOCOL Last administered on 

6/20/20at 23:50;  Start 6/13/20 at 06:00


Fentanyl Citrate (Fentanyl 2ml Vial) 25 mcg PRN Q1HR  PRN IV SEE COMMENTS;  

Start 6/13/20 at 06:00


Fentanyl Citrate (Fentanyl 2ml Vial) 50 mcg PRN Q1HR  PRN IV SEE COMMENTS;  

Start 6/13/20 at 06:00


Chlorhexidine Gluconate (Peridex) 15 ml BID MM ;  Start 6/13/20 at 09:00;  Stop 

6/13/20 at 07:58;  Status DC


Potassium Acetate 40 meq/Magnesium Sulfate 5 meq/ Multivitamins 10 ml/Chromium/ 

Copper/Manganese/ Seleni/Zn 1 ml/ Insulin Human Regular 30 unit/ Total 

Parenteral Nutrition/Amino Acids/Dextrose/ Fat Emulsion Intravenous 1,920 ml @  

80 mls/hr TPN  CONT IV  Last administered on 6/13/20at 21:19;  Start 6/13/20 at 

22:00;  Stop 6/14/20 at 21:59;  Status DC


Acetylcysteine (Mucomyst 20% Resp Treatment) 600 mg BID NEB  Last administered 

on 6/19/20at 09:33;  Start 6/13/20 at 21:00;  Stop 6/19/20 at 10:39;  Status DC


Magnesium Sulfate 100 ml @  25 mls/hr 1X  ONCE IV  Last administered on 

6/13/20at 15:48;  Start 6/13/20 at 15:45;  Stop 6/13/20 at 19:44;  Status DC


Potassium Acetate 40 meq/Magnesium Sulfate 5 meq/ Multivitamins 10 ml/Chromium/ 

Copper/Manganese/ Seleni/Zn 1 ml/ Insulin Human Regular 30 unit/ Total 

Parenteral Nutrition/Amino Acids/Dextrose/ Fat Emulsion Intravenous 1,920 ml @  

80 mls/hr TPN  CONT IV  Last administered on 6/14/20at 21:35;  Start 6/14/20 at 

22:00;  Stop 6/15/20 at 21:59;  Status DC


Potassium Chloride/Water 100 ml @  100 mls/hr Q1H IV  Last administered on 

6/15/20at 08:31;  Start 6/15/20 at 07:00;  Stop 6/15/20 at 08:59;  Status DC


Potassium Acetate 40 meq/Magnesium Sulfate 5 meq/ Multivitamins 10 ml/Chromium/ 

Copper/Manganese/ Seleni/Zn 1 ml/ Insulin Human Regular 30 unit/ Total 

Parenteral Nutrition/Amino Acids/Dextrose/ Fat Emulsion Intravenous 1,920 ml @  

80 mls/hr TPN  CONT IV  Last administered on 6/15/20at 21:54;  Start 6/15/20 at 

22:00;  Stop 6/16/20 at 19:34;  Status DC


Lidocaine HCl (Buffered Lidocaine 1%) 3 ml STK-MED ONCE .ROUTE ;  Start 6/15/20 

at 12:14;  Stop 6/15/20 at 12:14;  Status DC


Lidocaine HCl (Buffered Lidocaine 1%) 3 ml 1X  ONCE IJ  Last administered on 

6/15/20at 13:11;  Start 6/15/20 at 13:00;  Stop 6/15/20 at 13:01;  Status DC


Magnesium Sulfate 50 ml @ 25 mls/hr 1X  ONCE IV ;  Start 6/16/20 at 08:15;  Stop

6/16/20 at 10:14;  Status DC


Potassium Acetate 40 meq/Magnesium Sulfate 10 meq/ Multivitamins 10 ml/Chromium/

Copper/Manganese/ Seleni/Zn 1 ml/ Insulin Human Regular 20 unit/ Total Parent

eral Nutrition/Amino Acids/Dextrose/ Fat Emulsion Intravenous 1,920 ml @  80 

mls/hr TPN  CONT IV  Last administered on 6/16/20at 21:32;  Start 6/16/20 at 

22:00;  Stop 6/17/20 at 21:59;  Status DC


Potassium Chloride/Water 100 ml @  100 mls/hr Q1H IV  Last administered on 

6/17/20at 09:12;  Start 6/17/20 at 08:00;  Stop 6/17/20 at 09:59;  Status DC


Alteplase, Recombinant (Cathflo For Central Catheter Clearance) 4 mg 1X  ONCE 

INT CAT ;  Start 6/17/20 at 09:15;  Stop 6/17/20 at 09:16;  Status UNV


Alteplase, Recombinant (Cathflo For Central Catheter Clearance) 4 mg 1X  ONCE 

INT CAT ;  Start 6/17/20 at 09:15;  Stop 6/17/20 at 09:16;  Status UNV


Alteplase, Recombinant (Cathflo For Central Catheter Clearance) 4 mg 1X  ONCE 

INT CAT ;  Start 6/17/20 at 09:15;  Stop 6/17/20 at 09:16;  Status UNV


Alteplase, Recombinant 4 mg/ Sodium Chloride 20 ml @ 20 mls/hr 1X  ONCE IV  Last

administered on 6/17/20at 10:10;  Start 6/17/20 at 10:00;  Stop 6/17/20 at 

10:59;  Status DC


Alteplase, Recombinant 4 mg/ Sodium Chloride 20 ml @ 20 mls/hr 1X  ONCE IV  Last

administered on 6/17/20at 10:09;  Start 6/17/20 at 10:00;  Stop 6/17/20 at 

10:59;  Status DC


Alteplase, Recombinant 4 mg/ Sodium Chloride 20 ml @ 20 mls/hr 1X  ONCE IV  Last

administered on 6/17/20at 10:09;  Start 6/17/20 at 10:00;  Stop 6/17/20 at 

10:59;  Status DC


Potassium Acetate 60 meq/Magnesium Sulfate 10 meq/ Multivitamins 10 ml/Chromium/

Copper/Manganese/ Seleni/Zn 1 ml/ Insulin Human Regular 20 unit/ Total 

Parenteral Nutrition/Amino Acids/Dextrose/ Fat Emulsion Intravenous 1,920 ml @  

80 mls/hr TPN  CONT IV  Last administered on 6/17/20at 21:55;  Start 6/17/20 at 

22:00;  Stop 6/18/20 at 21:59;  Status DC


Albumin Human 500 ml @  125 mls/hr 1X  ONCE IV  Last administered on 6/18/20at 

12:01;  Start 6/18/20 at 11:15;  Stop 6/18/20 at 15:14;  Status DC


Sodium Chloride 500 ml @  500 mls/hr 1X  ONCE IV  Last administered on 6/18/20at

13:50;  Start 6/18/20 at 11:15;  Stop 6/18/20 at 12:14;  Status DC


Potassium Acetate 60 meq/Magnesium Sulfate 14 meq/ Multivitamins 10 ml/Chromium/

Copper/Manganese/ Seleni/Zn 1 ml/ Insulin Human Regular 20 unit/ Total 

Parenteral Nutrition/Amino Acids/Dextrose/ Fat Emulsion Intravenous 1,920 ml @  

80 mls/hr TPN  CONT IV  Last administered on 6/18/20at 22:26;  Start 6/18/20 at 

22:00;  Stop 6/19/20 at 21:59;  Status DC


Ciprofloxacin/ Dextrose 200 ml @  200 mls/hr Q12HR IV  Last administered on 

6/25/20at 08:27;  Start 6/18/20 at 21:00;  Stop 6/25/20 at 08:56;  Status DC


Albumin Human 250 ml @  62.5 mls/hr 1X  ONCE IV  Last administered on 6/19/20at 

11:09;  Start 6/19/20 at 11:00;  Stop 6/19/20 at 14:59;  Status DC


Furosemide (Lasix) 20 mg 1X  ONCE IVP  Last administered on 6/19/20at 14:52;  

Start 6/19/20 at 10:45;  Stop 6/19/20 at 10:49;  Status DC


Potassium Acetate 60 meq/Magnesium Sulfate 14 meq/ Multivitamins 10 ml/Chromium/

Copper/Manganese/ Seleni/Zn 1 ml/ Insulin Human Regular 15 unit/ Total 

Parenteral Nutrition/Amino Acids/Dextrose/ Fat Emulsion Intravenous 1,920 ml @  

80 mls/hr TPN  CONT IV  Last administered on 6/19/20at 22:08;  Start 6/19/20 at 

22:00;  Stop 6/20/20 at 21:59;  Status DC


Potassium Acetate 60 meq/Magnesium Sulfate 14 meq/ Multivitamins 10 ml/Chromium/

Copper/Manganese/ Seleni/Zn 1 ml/ Insulin Human Regular 15 unit/ Total 

Parenteral Nutrition/Amino Acids/Dextrose/ Fat Emulsion Intravenous 1,920 ml @  

80 mls/hr TPN  CONT IV  Last administered on 6/20/20at 22:12;  Start 6/20/20 at 

22:00;  Stop 6/21/20 at 21:59;  Status DC


Potassium Acetate 60 meq/Magnesium Sulfate 14 meq/ Multivitamins 10 ml/Chromium/

Copper/Manganese/ Seleni/Zn 1 ml/ Insulin Human Regular 15 unit/ Total 

Parenteral Nutrition/Amino Acids/Dextrose/ Fat Emulsion Intravenous 1,920 ml @  

80 mls/hr TPN  CONT IV  Last administered on 6/21/20at 22:22;  Start 6/21/20 at 

22:00;  Stop 6/22/20 at 21:59;  Status DC


Furosemide (Lasix) 20 mg 1X  ONCE IVP  Last administered on 6/22/20at 11:07;  

Start 6/22/20 at 10:30;  Stop 6/22/20 at 10:34;  Status DC


Potassium Acetate 60 meq/Magnesium Sulfate 14 meq/ Multivitamins 10 ml/Chromium/

Copper/Manganese/ Seleni/Zn 1 ml/ Insulin Human Regular 15 unit/ Sodium Chloride

20 meq/Total Parenteral Nutrition/Amino Acids/Dextrose/ Fat Emulsion Intravenous

1,920 ml @  80 mls/hr TPN  CONT IV  Last administered on 6/22/20at 21:54;  Start

6/22/20 at 22:00;  Stop 6/23/20 at 21:59;  Status DC


Potassium Acetate 30 meq/Magnesium Sulfate 14 meq/ Multivitamins 10 ml/Chromium/

Copper/Manganese/ Seleni/Zn 1 ml/ Insulin Human Regular 15 unit/ Sodium Chloride

20 meq/Potassium Chloride 30 meq/ Total Parenteral Nutrition/Amino 

Acids/Dextrose/ Fat Emulsion Intravenous 1,920 ml @  80 mls/hr TPN  CONT IV  

Last administered on 6/23/20at 21:46;  Start 6/23/20 at 22:00;  Stop 6/24/20 at 

21:59;  Status DC


Sodium Chloride 80 meq/Potassium Chloride 30 meq/ Potassium Acetate 30 

meq/Magnesium Sulfate 14 meq/ Multivitamins 10 ml/Chromium/ Copper/Manganese/ 

Seleni/Zn 1 ml/ Insulin Human Regular 15 unit/ Total Parenteral Nutrition/Amino 

Acids/Dextrose/ Fat Emulsion Intravenous 1,920 ml @  80 mls/hr TPN  CONT IV  

Last administered on 6/24/20at 22:33;  Start 6/24/20 at 22:00;  Stop 6/25/20 at 

21:59;  Status DC


Furosemide (Lasix) 40 mg 1X  ONCE IVP  Last administered on 6/24/20at 16:27;  

Start 6/24/20 at 15:30;  Stop 6/24/20 at 15:33;  Status DC


Albumin Human 250 ml @  62.5 mls/hr 1X  ONCE IV  Last administered on 6/24/20at 

16:27;  Start 6/24/20 at 15:30;  Stop 6/24/20 at 19:29;  Status DC


Sodium Chloride 80 meq/Potassium Chloride 30 meq/ Potassium Acetate 30 meq/Magne

sium Sulfate 14 meq/ Multivitamins 10 ml/Chromium/ Copper/Manganese/ Seleni/Zn 1

ml/ Insulin Human Regular 15 unit/ Total Parenteral Nutrition/Amino 

Acids/Dextrose/ Fat Emulsion Intravenous 1,920 ml @  80 mls/hr TPN  CONT IV  

Last administered on 6/25/20at 22:25;  Start 6/25/20 at 22:00;  Stop 6/26/20 at 

21:59;  Status DC


Sodium Chloride 80 meq/Potassium Chloride 30 meq/ Potassium Acetate 30 

meq/Magnesium Sulfate 14 meq/ Multivitamins 10 ml/Chromium/ Copper/Manganese/ 

Seleni/Zn 1 ml/ Insulin Human Regular 15 unit/ Total Parenteral Nutrition/Amino 

Acids/Dextrose/ Fat Emulsion Intravenous 1,920 ml @  80 mls/hr TPN  CONT IV  

Last administered on 6/26/20at 21:32;  Start 6/26/20 at 22:00;  Stop 6/27/20 at 

21:59;  Status DC


Sodium Chloride 80 meq/Potassium Chloride 30 meq/ Potassium Acetate 30 

meq/Magnesium Sulfate 14 meq/ Multivitamins 10 ml/Chromium/ Copper/Manganese/ 

Seleni/Zn 1 ml/ Insulin Human Regular 15 unit/ Total Parenteral Nutrition/Amino 

Acids/Dextrose/ Fat Emulsion Intravenous 1,920 ml @  80 mls/hr TPN  CONT IV  

Last administered on 6/27/20at 21:53;  Start 6/27/20 at 22:00;  Stop 6/28/20 at 

21:59;  Status DC


Acetylcysteine (Mucomyst 20% Resp Treatment) 600 mg RTBID NEB  Last administered

on 7/10/20at 07:29;  Start 6/27/20 at 12:00


Sodium Chloride 80 meq/Potassium Chloride 30 meq/ Potassium Acetate 30 

meq/Magnesium Sulfate 14 meq/ Multivitamins 10 ml/Chromium/ Copper/Manganese/ Se

aram/Zn 1 ml/ Insulin Human Regular 15 unit/ Total Parenteral Nutrition/Amino 

Acids/Dextrose/ Fat Emulsion Intravenous 1,920 ml @  80 mls/hr TPN  CONT IV  

Last administered on 6/28/20at 22:06;  Start 6/28/20 at 22:00;  Stop 6/29/20 at 

21:59;  Status DC


Meropenem 500 mg/ Sodium Chloride 50 ml @  100 mls/hr Q6HRS IV  Last 

administered on 7/10/20at 07:43;  Start 6/28/20 at 18:00


Daptomycin 500 mg/ Sodium Chloride 50 ml @  100 mls/hr Q24H IV  Last 

administered on 7/6/20at 21:47;  Start 6/28/20 at 19:00;  Stop 7/7/20 at 08:13; 

Status DC


Sodium Chloride 80 meq/Potassium Chloride 30 meq/ Potassium Acetate 30 

meq/Magnesium Sulfate 14 meq/ Multivitamins 10 ml/Chromium/ Copper/Manganese/ 

Seleni/Zn 1 ml/ Insulin Human Regular 15 unit/ Total Parenteral Nutrition/Amino 

Acids/Dextrose/ Fat Emulsion Intravenous 1,920 ml @  80 mls/hr TPN  CONT IV  

Last administered on 6/29/20at 22:09;  Start 6/29/20 at 22:00;  Stop 6/30/20 at 

21:59;  Status DC


Heparin Sodium (Porcine) 1000 unit/Sodium Chloride 1,001 ml @  1,001 mls/hr 1X  

ONCE IRR ;  Start 6/30/20 at 06:00;  Stop 6/30/20 at 06:59;  Status DC


Propofol (Diprivan) 200 mg STK-MED ONCE IV ;  Start 6/30/20 at 07:44;  Stop 

6/30/20 at 07:44;  Status DC


Lidocaine HCl (Lidocaine Pf 2% Vial) 5 ml STK-MED ONCE .ROUTE ;  Start 6/30/20 

at 07:44;  Stop 6/30/20 at 07:44;  Status DC


Fentanyl Citrate (Fentanyl 2ml Vial) 100 mcg STK-MED ONCE .ROUTE ;  Start 

6/30/20 at 07:44;  Stop 6/30/20 at 07:44;  Status DC


Rocuronium Bromide (Zemuron) 100 mg STK-MED ONCE .ROUTE ;  Start 6/30/20 at 

07:44;  Stop 6/30/20 at 07:44;  Status DC


Micafungin Sodium 100 mg/Dextrose 100 ml @  100 mls/hr Q24H IV  Last 

administered on 7/9/20at 09:15;  Start 6/30/20 at 08:30


Bupivacaine HCl/ Epinephrine Bitart (Sensorcain-Epi 0.5%-1:[id number] Mpf) 30 ml 

STK-MED ONCE .ROUTE ;  Start 6/30/20 at 08:34;  Stop 6/30/20 at 08:35;  Status 

DC


Iohexol (Omnipaque 300 Mg/ml) 50 ml STK-MED ONCE .ROUTE  Last administered on 

6/30/20at 13:30;  Start 6/30/20 at 08:35;  Stop 6/30/20 at 08:35;  Status DC


Sodium Chloride 80 meq/Potassium Chloride 30 meq/ Potassium Acetate 30 

meq/Magnesium Sulfate 14 meq/ Multivitamins 10 ml/Chromium/ Copper/Manganese/ 

Seleni/Zn 1 ml/ Insulin Human Regular 15 unit/ Total Parenteral Nutrition/Amino 

Acids/Dextrose/ Fat Emulsion Intravenous 1,920 ml @  80 mls/hr TPN  CONT IV  

Last administered on 7/1/20at 01:22;  Start 6/30/20 at 22:00;  Stop 7/1/20 at 

21:59;  Status DC


Phenylephrine HCl (Ken-Synephrine Inj) 10 mg STK-MED ONCE .ROUTE ;  Start 

6/30/20 at 10:15;  Stop 6/30/20 at 10:15;  Status DC


Desflurane (Suprane) 90 ml STK-MED ONCE IH ;  Start 6/30/20 at 10:18;  Stop 

6/30/20 at 10:19;  Status DC


Albumin Human 500 ml @  As Directed STK-MED ONCE IV ;  Start 6/30/20 at 11:06;  

Stop 6/30/20 at 11:06;  Status DC


Vasopressin (Vasostrict) 20 unit STK-MED ONCE .ROUTE ;  Start 6/30/20 at 12:23; 

Stop 6/30/20 at 12:23;  Status DC


Phenylephrine HCl (Ken-Synephrine Inj) 10 mg STK-MED ONCE .ROUTE ;  Start 

6/30/20 at 13:33;  Stop 6/30/20 at 13:33;  Status DC


Phenylephrine HCl (Ken-Synephrine Inj) 10 mg STK-MED ONCE .ROUTE ;  Start 6 /30/20 at 13:33;  Stop 6/30/20 at 13:33;  Status DC


Ondansetron HCl (Zofran) 4 mg STK-MED ONCE .ROUTE ;  Start 6/30/20 at 13:33;  

Stop 6/30/20 at 13:33;  Status DC


Enoxaparin Sodium (Lovenox 40mg Syringe) 40 mg Q24H SQ  Last administered on 

7/10/20at 08:30;  Start 7/1/20 at 08:00


Sodium Chloride (Normal Saline Flush) 3 ml QSHIFT  PRN IV AFTER MEDS AND BLOOD 

DRAWS;  Start 6/30/20 at 14:45


Naloxone HCl (Narcan) 0.4 mg PRN Q2MIN  PRN IV SEE INSTRUCTIONS;  Start 6/30/20 

at 14:45


Sodium Chloride 1,000 ml @  25 mls/hr Q24H IV  Last administered on 7/8/20at 

21:15;  Start 6/30/20 at 14:33


Morphine Sulfate (Morphine Sulfate) 1 mg PRN Q1HR  PRN IV PAIN;  Start 6/30/20 

at 14:45


Midazolam HCl 100 mg/Sodium Chloride 100 ml @ 1 mls/hr CONT  PRN IV SEE I/O 

RECORD Last administered on 7/3/20at 18:48;  Start 6/30/20 at 14:45


Phenylephrine HCl (PHENYLEPHRINE in 0.9% NACL PF) 1 mg STK-MED ONCE IV ;  Start 

6/30/20 at 14:44;  Stop 6/30/20 at 14:45;  Status DC


Ephedrine Sulfate (ePHEDrine PF IN SALINE SYRINGE) 50 mg STK-MED ONCE IV ;  

Start 6/30/20 at 14:45;  Stop 6/30/20 at 14:45;  Status DC


Vasopressin 20 unit/Dextrose 101 ml @  12 mls/hr CONT  PRN IV SEE I/O RECORD 

Last administered on 7/7/20at 04:17;  Start 6/30/20 at 15:30


Sodium Chloride 1,000 ml @  1,000 mls/hr 1X  ONCE IV  Last administered on 

6/30/20at 15:42;  Start 6/30/20 at 15:45;  Stop 6/30/20 at 16:44;  Status DC


Albumin Human 500 ml @  125 mls/hr 1X  ONCE IV ;  Start 6/30/20 at 16:00;  Stop 

6/30/20 at 19:59;  Status DC


Albumin Human 500 ml @  125 mls/hr PRN Q1HR  PRN IV PER PROTOCOL;  Start 6/30/20

at 15:45


Magnesium Sulfate 50 ml @ 25 mls/hr 1X  ONCE IV  Last administered on 6/30/20at 

17:02;  Start 6/30/20 at 16:30;  Stop 6/30/20 at 18:29;  Status DC


Sodium Bicarbonate (Sodium Bicarb Adult 8.4% Syr) 50 meq STK-MED ONCE .ROUTE ;  

Start 6/30/20 at 16:20;  Stop 6/30/20 at 16:20;  Status DC


Sodium Bicarbonate (Sodium Bicarb Adult 8.4% Syr) 100 meq 1X  ONCE IV  Last 

administered on 6/30/20at 17:07;  Start 6/30/20 at 16:30;  Stop 6/30/20 at 

16:31;  Status DC


Sodium Bicarbonate 150 meq/Dextrose 1,150 ml @  75 mls/hr 1X  ONCE IV  Last 

administered on 6/30/20at 20:02;  Start 6/30/20 at 16:30;  Stop 7/1/20 at 07:49;

 Status DC


Sodium Chloride 80 meq/Potassium Chloride 30 meq/ Potassium Acetate 30 

meq/Magnesium Sulfate 14 meq/ Multivitamins 10 ml/Chromium/ Copper/Manganese/ 

Seleni/Zn 1 ml/ Insulin Human Regular 15 unit/ Total Parenteral Nutrition/Amino 

Acids/Dextrose/ Fat Emulsion Intravenous 1,920 ml @  80 mls/hr TPN  CONT IV  

Last administered on 7/1/20at 23:05;  Start 7/1/20 at 22:00;  Stop 7/2/20 at 

21:59;  Status DC


Sodium Chloride 100 meq/Potassium Chloride 30 meq/ Potassium Acetate 30 

meq/Magnesium Sulfate 12 meq/ Multivitamins 10 ml/Chromium/ Copper/Manganese/ 

Seleni/Zn 1 ml/ Insulin Human Regular 15 unit/ Total Parenteral Nutrition/Amino 

Acids/Dextrose/ Fat Emulsion Intravenous 1,920 ml @  80 mls/hr TPN  CONT IV  

Last administered on 7/2/20at 21:52;  Start 7/2/20 at 22:00;  Stop 7/3/20 at 

21:59;  Status DC


Sodium Chloride 100 meq/Potassium Chloride 30 meq/ Potassium Acetate 30 

meq/Magnesium Sulfate 12 meq/ Multivitamins 10 ml/Chromium/ Copper/Manganese/ 

Seleni/Zn 1 ml/ Insulin Human Regular 15 unit/ Total Parenteral Nutrition/Amino 

Acids/Dextrose/ Fat Emulsion Intravenous 1,920 ml @  80 mls/hr TPN  CONT IV  

Last administered on 7/3/20at 21:46;  Start 7/3/20 at 22:00;  Stop 7/4/20 at 

21:59;  Status DC


Sodium Chloride 100 meq/Potassium Chloride 30 meq/ Potassium Acetate 30 

meq/Magnesium Sulfate 12 meq/ Multivitamins 10 ml/Chromium/ Copper/Manganese/ 

Seleni/Zn 1 ml/ Insulin Human Regular 15 unit/ Total Parenteral Nutrition/Amino 

Acids/Dextrose/ Fat Emulsion Intravenous 1,800 ml @  75 mls/hr TPN  CONT IV  

Last administered on 7/4/20at 22:04;  Start 7/4/20 at 22:00;  Stop 7/5/20 at 

21:59;  Status DC


Fentanyl Citrate 55 ml @ 0 mls/hr CONT  PRN IV SEE COMMENTS Last administered on

7/6/20at 23:55;  Start 7/4/20 at 13:00;  Stop 7/9/20 at 17:28;  Status DC


Sodium Chloride 100 meq/Potassium Chloride 30 meq/ Potassium Acetate 30 

meq/Magnesium Sulfate 12 meq/ Multivitamins 10 ml/Chromium/ Copper/Manganese/ 

Seleni/Zn 1 ml/ Insulin Human Regular 15 unit/ Total Parenteral Nutrition/Amino 

Acids/Dextrose/ Fat Emulsion Intravenous 1,680 ml @  70 mls/hr TPN  CONT IV  

Last administered on 7/5/20at 21:23;  Start 7/5/20 at 22:00;  Stop 7/6/20 at 

21:59;  Status DC


Sodium Chloride 110 meq/Potassium Chloride 30 meq/ Potassium Acetate 30 

meq/Magnesium Sulfate 15 meq/ Multivitamins 10 ml/Chromium/ Copper/Manganese/ 

Seleni/Zn 1 ml/ Insulin Human Regular 15 unit/ Total Parenteral Nutrition/Amino 

Acids/Dextrose/ Fat Emulsion Intravenous 1,680 ml @  70 mls/hr TPN  CONT IV  

Last administered on 7/6/20at 21:48;  Start 7/6/20 at 22:00;  Stop 7/7/20 at 

21:59;  Status DC


Sodium Chloride 110 meq/Potassium Chloride 30 meq/ Potassium Acetate 30 

meq/Magnesium Sulfate 15 meq/ Multivitamins 10 ml/Chromium/ Copper/Manganese/ 

Seleni/Zn 1 ml/ Insulin Human Regular 15 unit/ Total Parenteral Nutrition/Amino 

Acids/Dextrose/ Fat Emulsion Intravenous 1,680 ml @  70 mls/hr TPN  CONT IV  

Last administered on 7/7/20at 21:33;  Start 7/7/20 at 22:00;  Stop 7/8/20 at 

21:59;  Status DC


Sodium Chloride 110 meq/Potassium Chloride 30 meq/ Potassium Acetate 30 meq/Magn

esium Sulfate 15 meq/ Multivitamins 10 ml/Chromium/ Copper/Manganese/ Seleni/Zn 

1 ml/ Insulin Human Regular 15 unit/ Total Parenteral Nutrition/Amino 

Acids/Dextrose/ Fat Emulsion Intravenous 1,680 ml @  70 mls/hr TPN  CONT IV  

Last administered on 7/8/20at 21:51;  Start 7/8/20 at 22:00;  Stop 7/9/20 at 

21:59;  Status DC


Sodium Chloride 90 meq/Potassium Chloride 30 meq/ Potassium Acetate 30 

meq/Magnesium Sulfate 15 meq/ Multivitamins 10 ml/Chromium/ Copper/Manganese/ 

Seleni/Zn 1 ml/ Insulin Human Regular 15 unit/ Total Parenteral Nutrition/Amino 

Acids/Dextrose/ Fat Emulsion Intravenous 1,680 ml @  70 mls/hr TPN  CONT IV  

Last administered on 7/9/20at 22:38;  Start 7/9/20 at 22:00;  Stop 7/10/20 at 

21:59


Fentanyl Citrate 30 ml @ 0 mls/hr CONT  PRN IV SEE I/O RECORD;  Start 7/9/20 at 

17:30


Fentanyl (Duragesic 12mcg/ Hr Patch) 1 patch Q3DAYS TD ;  Start 7/10/20 at 09:00





Active Scripts


Active


Reported


Bisoprolol Fumarate 5 Mg Tablet 10 Mg PO DAILY


Vitals/I & O





Vital Sign - Last 24 Hours








 7/9/20 7/9/20 7/9/20 7/9/20





 10:00 11:00 11:50 12:00


 


Temp    99.9





    99.9


 


Pulse 110 104  112


 


Resp 24 24  28


 


B/P (MAP) 151/80 (103) 156/80 (105)  141/88 (105)


 


Pulse Ox 100 100 99 100


 


O2 Delivery Venturi Mask Venturi Mask Tracheal Collar Ventilator


 


O2 Flow Rate   10.0 


 


    





    





 7/9/20 7/9/20 7/9/20 7/9/20





 12:00 13:00 14:00 15:00


 


Pulse  112 120 106


 


Resp  24 24 24


 


B/P (MAP)  146/92 (110)  


 


Pulse Ox  100 100 100


 


O2 Delivery Trach Collar Venturi Mask Venturi Mask Venturi Mask


 


O2 Flow Rate 10.0   





 7/9/20 7/9/20 7/9/20 7/9/20





 15:13 16:00 16:00 17:00


 


Temp  98.9  





  98.9  


 


Pulse  114  106


 


Resp  24  24


 


B/P (MAP)  148/85 (106)  


 


Pulse Ox 100 100  100


 


O2 Delivery Tracheal Collar Venturi Mask Trach Collar Venturi Mask


 


O2 Flow Rate 10.0  10.0 


 


    





    





 7/9/20 7/9/20 7/9/20 7/9/20





 18:00 19:00 19:23 20:00


 


Temp    98.6





    98.6


 


Pulse 114 113  111


 


Resp 24 36  40


 


B/P (MAP)  147/97 (114)  151/95 (113)


 


Pulse Ox 100 100 100 100


 


O2 Delivery Venturi Mask Venturi Mask Tracheal Collar Venturi Mask


 


O2 Flow Rate   10.0 


 


    





    





 7/9/20 7/9/20 7/9/20 7/9/20





 20:00 21:00 22:00 23:00


 


Pulse  110 107 106


 


Resp  34 26 25


 


B/P (MAP)  152/96 (114) 159/91 (113) 141/71 (94)


 


Pulse Ox  100 100 100


 


O2 Delivery Trach Collar Venturi Mask Venturi Mask Venturi Mask


 


O2 Flow Rate 10.0   





 7/9/20 7/10/20 7/10/20 7/10/20





 23:38 00:00 00:00 01:00


 


Temp   98.5 





   98.5 


 


Pulse   108 110


 


Resp   33 32


 


B/P (MAP)   143/76 (98) 144/78 (100)


 


Pulse Ox 98  100 100


 


O2 Delivery Tracheal Collar Trach Collar Venturi Mask Venturi Mask


 


O2 Flow Rate 10.0 10.0  


 


    





    





 7/10/20 7/10/20 7/10/20 7/10/20





 02:00 03:00 04:00 04:00


 


Temp    99.3





    99.3


 


Pulse 109 119  103


 


Resp 32 32  34


 


B/P (MAP) 148/79 (102) 142/83 (102)  147/82 (103)


 


Pulse Ox 100 100  100


 


O2 Delivery Venturi Mask Venturi Mask Trach Collar Venturi Mask


 


O2 Flow Rate   10.0 


 


    





    





 7/10/20 7/10/20 7/10/20 7/10/20





 04:32 05:00 06:00 07:29


 


Pulse  108 109 


 


Resp  34 36 


 


B/P (MAP)  147/51 (83) 181/101 (127) 


 


Pulse Ox 98 100 100 98


 


O2 Delivery Tracheal Collar Venturi Mask Venturi Mask Tracheal Collar


 


O2 Flow Rate 10.0   10.0





 7/10/20   





 08:32   


 


Pulse 108   


 


B/P (MAP) 172/101   














Intake and Output   


 


 7/9/20 7/9/20 7/10/20





 15:00 23:00 07:00


 


Intake Total 100 ml 1222 ml 100 ml


 


Output Total 240 ml 1160 ml 875 ml


 


Balance -140 ml 62 ml -775 ml











Justicifation of Admission Dx:


Justifications for Admission:


Justification of Admission Dx:  Yes











GAETANO GONCALVES MD        Jul 10, 2020 09:11

## 2020-07-10 NOTE — NUR
SS following up with discharge planning. SS reviewed pt chart and discussed with pt RN. Pt 
working with PT/OT now. Pt max assist with PT/OT but was able to pivot some. Pt remains on 
TPN, IV Micafungin, and IV Meropenem. Pt continues to have chest tube and J tube. All drains 
remain at this time. Pt on trach collar. Pt remains self pay. SS will continue to follow for 
discharge planning.

## 2020-07-10 NOTE — NUR
Pt more awake and mouthing words today. Stood for pivot to chair with max assist. Visited 
with mother at bedside. Fent changed to patch. Pt able to cough secretions out trach. Vane PM 
valve for 8 min. Trach shield 40% all night and today. Low grade fever. All drains intact. 
no watery stools today.

## 2020-07-10 NOTE — PDOC
PULMONARY PROGRESS NOTES


Subjective


Patient sitting up in the chair trach shield in place she is awake alert 

following commands no respiratory complaint


Vitals





Vital Signs








  Date Time  Temp Pulse Resp B/P (MAP) Pulse Ox O2 Delivery O2 Flow Rate FiO2


 


7/10/20 08:32  108  172/101    


 


7/10/20 07:29     98 Tracheal Collar 10.0 


 


7/10/20 06:00   36     


 


7/10/20 04:00 99.3       





 99.3       








Lungs:  Crackles


Cardiovascular:  S1, S2


Abdomen:  Soft, Non-tender, Other (multiple ROBERT drains )


Extremities:  Other (+1 BLE edema)


Skin:  Warm


Labs





Laboratory Tests








Test


 7/8/20


11:52 7/8/20


17:42 7/9/20


00:05 7/9/20


05:35


 


Glucose (Fingerstick)


 163 mg/dL


(70-99) 150 mg/dL


(70-99) 144 mg/dL


(70-99) 





 


White Blood Count


 


 


 


 13.4 x10^3/uL


(4.0-11.0)


 


Red Blood Count


 


 


 


 2.64 x10^6/uL


(3.50-5.40)


 


Hemoglobin


 


 


 


 7.9 g/dL


(12.0-15.5)


 


Hematocrit


 


 


 


 23.5 %


(36.0-47.0)


 


Mean Corpuscular Volume    89 fL () 


 


Mean Corpuscular Hemoglobin    30 pg (25-35) 


 


Mean Corpuscular Hemoglobin


Concent 


 


 


 34 g/dL


(31-37)


 


Red Cell Distribution Width


 


 


 


 15.1 %


(11.5-14.5)


 


Platelet Count


 


 


 


 559 x10^3/uL


(140-400)


 


Neutrophils (%) (Auto)    83 % (31-73) 


 


Lymphocytes (%) (Auto)    8 % (24-48) 


 


Monocytes (%) (Auto)    7 % (0-9) 


 


Eosinophils (%) (Auto)    2 % (0-3) 


 


Basophils (%) (Auto)    0 % (0-3) 


 


Neutrophils # (Auto)


 


 


 


 11.1 x10^3/uL


(1.8-7.7)


 


Lymphocytes # (Auto)


 


 


 


 1.1 x10^3/uL


(1.0-4.8)


 


Monocytes # (Auto)


 


 


 


 1.0 x10^3/uL


(0.0-1.1)


 


Eosinophils # (Auto)


 


 


 


 0.2 x10^3/uL


(0.0-0.7)


 


Basophils # (Auto)


 


 


 


 0.0 x10^3/uL


(0.0-0.2)


 


Sodium Level


 


 


 


 145 mmol/L


(136-145)


 


Potassium Level


 


 


 


 4.2 mmol/L


(3.5-5.1)


 


Chloride Level


 


 


 


 110 mmol/L


()


 


Carbon Dioxide Level


 


 


 


 32 mmol/L


(21-32)


 


Anion Gap    3 (6-14) 


 


Blood Urea Nitrogen


 


 


 


 14 mg/dL


(7-20)


 


Creatinine


 


 


 


 0.5 mg/dL


(0.6-1.0)


 


Estimated GFR


(Cockcroft-Gault) 


 


 


 131.1 





 


Glucose Level


 


 


 


 150 mg/dL


(70-99)


 


Calcium Level


 


 


 


 9.8 mg/dL


(8.5-10.1)


 


Phosphorus Level


 


 


 


 3.6 mg/dL


(2.6-4.7)


 


Magnesium Level


 


 


 


 2.0 mg/dL


(1.8-2.4)


 


Test


 7/9/20


05:50 7/9/20


12:48 7/9/20


23:04 7/10/20


05:50


 


Glucose (Fingerstick)


 144 mg/dL


(70-99) 131 mg/dL


(70-99) 138 mg/dL


(70-99) 





 


White Blood Count


 


 


 


 15.4 x10^3/uL


(4.0-11.0)


 


Red Blood Count


 


 


 


 2.86 x10^6/uL


(3.50-5.40)


 


Hemoglobin


 


 


 


 8.4 g/dL


(12.0-15.5)


 


Hematocrit


 


 


 


 25.4 %


(36.0-47.0)


 


Mean Corpuscular Volume    89 fL () 


 


Mean Corpuscular Hemoglobin    29 pg (25-35) 


 


Mean Corpuscular Hemoglobin


Concent 


 


 


 33 g/dL


(31-37)


 


Red Cell Distribution Width


 


 


 


 15.0 %


(11.5-14.5)


 


Platelet Count


 


 


 


 681 x10^3/uL


(140-400)


 


Neutrophils (%) (Auto)    84 % (31-73) 


 


Lymphocytes (%) (Auto)    8 % (24-48) 


 


Monocytes (%) (Auto)    7 % (0-9) 


 


Eosinophils (%) (Auto)    1 % (0-3) 


 


Basophils (%) (Auto)    0 % (0-3) 


 


Neutrophils # (Auto)


 


 


 


 12.9 x10^3/uL


(1.8-7.7)


 


Lymphocytes # (Auto)


 


 


 


 1.2 x10^3/uL


(1.0-4.8)


 


Monocytes # (Auto)


 


 


 


 1.0 x10^3/uL


(0.0-1.1)


 


Eosinophils # (Auto)


 


 


 


 0.2 x10^3/uL


(0.0-0.7)


 


Basophils # (Auto)


 


 


 


 0.1 x10^3/uL


(0.0-0.2)


 


Sodium Level


 


 


 


 143 mmol/L


(136-145)


 


Potassium Level


 


 


 


 4.1 mmol/L


(3.5-5.1)


 


Chloride Level


 


 


 


 107 mmol/L


()


 


Carbon Dioxide Level


 


 


 


 32 mmol/L


(21-32)


 


Anion Gap    4 (6-14) 


 


Blood Urea Nitrogen


 


 


 


 15 mg/dL


(7-20)


 


Creatinine


 


 


 


 0.5 mg/dL


(0.6-1.0)


 


Estimated GFR


(Cockcroft-Gault) 


 


 


 131.1 





 


Glucose Level


 


 


 


 151 mg/dL


(70-99)


 


Calcium Level


 


 


 


 9.9 mg/dL


(8.5-10.1)


 


Test


 7/10/20


06:07 


 


 





 


Glucose (Fingerstick)


 103 mg/dL


(70-99) 


 


 











Laboratory Tests








Test


 7/9/20


12:48 7/9/20


23:04 7/10/20


05:50 7/10/20


06:07


 


Glucose (Fingerstick)


 131 mg/dL


(70-99) 138 mg/dL


(70-99) 


 103 mg/dL


(70-99)


 


White Blood Count


 


 


 15.4 x10^3/uL


(4.0-11.0) 





 


Red Blood Count


 


 


 2.86 x10^6/uL


(3.50-5.40) 





 


Hemoglobin


 


 


 8.4 g/dL


(12.0-15.5) 





 


Hematocrit


 


 


 25.4 %


(36.0-47.0) 





 


Mean Corpuscular Volume   89 fL ()  


 


Mean Corpuscular Hemoglobin   29 pg (25-35)  


 


Mean Corpuscular Hemoglobin


Concent 


 


 33 g/dL


(31-37) 





 


Red Cell Distribution Width


 


 


 15.0 %


(11.5-14.5) 





 


Platelet Count


 


 


 681 x10^3/uL


(140-400) 





 


Neutrophils (%) (Auto)   84 % (31-73)  


 


Lymphocytes (%) (Auto)   8 % (24-48)  


 


Monocytes (%) (Auto)   7 % (0-9)  


 


Eosinophils (%) (Auto)   1 % (0-3)  


 


Basophils (%) (Auto)   0 % (0-3)  


 


Neutrophils # (Auto)


 


 


 12.9 x10^3/uL


(1.8-7.7) 





 


Lymphocytes # (Auto)


 


 


 1.2 x10^3/uL


(1.0-4.8) 





 


Monocytes # (Auto)


 


 


 1.0 x10^3/uL


(0.0-1.1) 





 


Eosinophils # (Auto)


 


 


 0.2 x10^3/uL


(0.0-0.7) 





 


Basophils # (Auto)


 


 


 0.1 x10^3/uL


(0.0-0.2) 





 


Sodium Level


 


 


 143 mmol/L


(136-145) 





 


Potassium Level


 


 


 4.1 mmol/L


(3.5-5.1) 





 


Chloride Level


 


 


 107 mmol/L


() 





 


Carbon Dioxide Level


 


 


 32 mmol/L


(21-32) 





 


Anion Gap   4 (6-14)  


 


Blood Urea Nitrogen


 


 


 15 mg/dL


(7-20) 





 


Creatinine


 


 


 0.5 mg/dL


(0.6-1.0) 





 


Estimated GFR


(Cockcroft-Gault) 


 


 131.1 


 





 


Glucose Level


 


 


 151 mg/dL


(70-99) 





 


Calcium Level


 


 


 9.9 mg/dL


(8.5-10.1) 











Medications





Active Scripts








 Medications  Dose


 Route/Sig


 Max Daily Dose Days Date Category


 


 Bisoprolol


 Fumarate 5 Mg


 Tablet  10 Mg


 PO DAILY


   3/16/20 Reported








Comments


 CXR 6/28/20


IMPRESSION:


No significant interval change compared to 6/25/2020.


 











ct abdomen /pelvis 6/6


1. Removal of the percutaneous pigtail drainage catheters since the prior 


exam. Sequela of pancreatitis with extensive pseudocysts again 


demonstrated, the right-sided collections are slightly larger since the 


prior exam, the left-sided collections are stable. See above.


2. Moderate to large left pleural effusion with atelectasis and collapse 


of most of the left lower lobe, stable. Small right pleural effusion is 


stable.


3. Gallstone.





ct chest 6/15 reviewed








 GRAM NEG COCCOBACILLI:MANY


        SQUAMOUS EPI CELL:RARE


        PMN (WBCs):FEW


        Unless otherwise specified, Testing Performed by:


        [address] [address]


        For Inquires, the Physician may contact the Microbiology


        department at [phone]





  RESPIRATORY CULTURE  Final  


        Final





        MANY GRAM NEGATIVE RODS on 06/15/20 at 1107


        FINAL ID= [PSEUDOMONAS AERUGINOSA]


        MICRO CHARGES


        PSEUDOMONAS AERUGINOSA





  ANTIMICROBIAL SUSCEPTIBILITY  Final  


        Comment





        NEG ANSON 56


        PSEUDOMONAS AERUGINOSA


        ANTIBIOTIC                        RESULT          INTERPRETATION


        AMIKACIN                          <=16                  S


        AZTREONAM                         <=4                   S


        CEFTAZIDIME                       <=1                   S


        CIPROFLOXACIN                     <=0.25                S


        CEFEPIME                          <=2                   S


        CEFTAZIDIME/AVIBACTAM             <=4                   S


        GENTAMICIN                        <=2                   S


        LEVOFLOXACIN                      <=0.5                 S





Impression


.





IMPRESSION:


1.  Acute hypoxemic respiratory failure secondary to ARDS status post trach, 

developed anemia 6/7, blood drainage from RLQ abdomen drain site, and 

surrounding firmness  / developed septic shock 6/7 from abdomen source, required

levo 6/7


s/p 3 new drains 6/7 with brown color drainage,  


2.  Gallstone pancreatitis, now with ongoing bleeding from prior drain. Anemic. 

s/p Tx multiple units over several days


3.  septic shock/sepsis, recurrent 6/7, source abdomen. ,


4.  Acute kidney injury-, Off HD--renal function decling. suspect JED on CKD due

to hypotension , improved now


5.  Acute gallstone pancreatitis.


6.  Hypoalbuminemia.


7.  Moderate persistent effusions, s/p left thora  5/12, reaccumulation of left 

effusion. O2 requirement not changed. 


8.  Fever- ,hypotension. suspect recurrent sepsis/ likely pancreatic source.  

Per ID, per surgery--


9.  Chronic anemia-- ongoing / s/p PRBC


10. Covid 19 testing negative


11. Moderate to large ascites-S/P paracentisis


12.S/P paracentisis with 4 liters removed on 4/15/20


13. S/P IR drain placement on 5/8/2020, removal, re inserted 6/7


14. Depression/Anxiety 


15. Increase effusion, ? loculated/ s/p chest tube.. drainage slowing down. 





6/30


S/P Exploratory laparotomy, lysis of adhesions, subtotal cholecystectomy with 

cholangiogram, gastrojejunostomy tube placement, pancreatic necrosectomy


leukocytosis- improving





Plan


.


Continue trach shield as tolerated


Up to chair


PT OT


Follow surgery input


DVT GI prophylaxis


ABX per ID 


Continue TF and TPN for nutrition support 


DVT/GI PPX


D/W RN and RT 








CC time 30 minutes











STEVE MIRANDA MD              Jul 10, 2020 08:36

## 2020-07-11 VITALS — SYSTOLIC BLOOD PRESSURE: 156 MMHG | DIASTOLIC BLOOD PRESSURE: 90 MMHG

## 2020-07-11 VITALS — DIASTOLIC BLOOD PRESSURE: 105 MMHG | SYSTOLIC BLOOD PRESSURE: 189 MMHG

## 2020-07-11 VITALS — DIASTOLIC BLOOD PRESSURE: 87 MMHG | SYSTOLIC BLOOD PRESSURE: 163 MMHG

## 2020-07-11 VITALS — DIASTOLIC BLOOD PRESSURE: 80 MMHG | SYSTOLIC BLOOD PRESSURE: 138 MMHG

## 2020-07-11 VITALS — SYSTOLIC BLOOD PRESSURE: 143 MMHG | DIASTOLIC BLOOD PRESSURE: 85 MMHG

## 2020-07-11 VITALS — DIASTOLIC BLOOD PRESSURE: 98 MMHG | SYSTOLIC BLOOD PRESSURE: 163 MMHG

## 2020-07-11 VITALS — DIASTOLIC BLOOD PRESSURE: 86 MMHG | SYSTOLIC BLOOD PRESSURE: 158 MMHG

## 2020-07-11 VITALS — DIASTOLIC BLOOD PRESSURE: 100 MMHG | SYSTOLIC BLOOD PRESSURE: 178 MMHG

## 2020-07-11 VITALS — DIASTOLIC BLOOD PRESSURE: 81 MMHG | SYSTOLIC BLOOD PRESSURE: 147 MMHG

## 2020-07-11 VITALS — DIASTOLIC BLOOD PRESSURE: 94 MMHG | SYSTOLIC BLOOD PRESSURE: 160 MMHG

## 2020-07-11 VITALS — DIASTOLIC BLOOD PRESSURE: 95 MMHG | SYSTOLIC BLOOD PRESSURE: 152 MMHG

## 2020-07-11 VITALS — SYSTOLIC BLOOD PRESSURE: 164 MMHG | DIASTOLIC BLOOD PRESSURE: 100 MMHG

## 2020-07-11 VITALS — DIASTOLIC BLOOD PRESSURE: 91 MMHG | SYSTOLIC BLOOD PRESSURE: 162 MMHG

## 2020-07-11 VITALS — SYSTOLIC BLOOD PRESSURE: 175 MMHG | DIASTOLIC BLOOD PRESSURE: 102 MMHG

## 2020-07-11 VITALS — SYSTOLIC BLOOD PRESSURE: 156 MMHG | DIASTOLIC BLOOD PRESSURE: 86 MMHG

## 2020-07-11 VITALS — DIASTOLIC BLOOD PRESSURE: 96 MMHG | SYSTOLIC BLOOD PRESSURE: 156 MMHG

## 2020-07-11 VITALS — DIASTOLIC BLOOD PRESSURE: 94 MMHG | SYSTOLIC BLOOD PRESSURE: 154 MMHG

## 2020-07-11 VITALS — SYSTOLIC BLOOD PRESSURE: 171 MMHG | DIASTOLIC BLOOD PRESSURE: 100 MMHG

## 2020-07-11 VITALS — DIASTOLIC BLOOD PRESSURE: 82 MMHG | SYSTOLIC BLOOD PRESSURE: 150 MMHG

## 2020-07-11 VITALS — DIASTOLIC BLOOD PRESSURE: 82 MMHG | SYSTOLIC BLOOD PRESSURE: 151 MMHG

## 2020-07-11 VITALS — DIASTOLIC BLOOD PRESSURE: 95 MMHG | SYSTOLIC BLOOD PRESSURE: 151 MMHG

## 2020-07-11 VITALS — SYSTOLIC BLOOD PRESSURE: 161 MMHG | DIASTOLIC BLOOD PRESSURE: 95 MMHG

## 2020-07-11 VITALS — DIASTOLIC BLOOD PRESSURE: 93 MMHG | SYSTOLIC BLOOD PRESSURE: 145 MMHG

## 2020-07-11 VITALS — SYSTOLIC BLOOD PRESSURE: 142 MMHG | DIASTOLIC BLOOD PRESSURE: 87 MMHG

## 2020-07-11 VITALS — DIASTOLIC BLOOD PRESSURE: 97 MMHG | SYSTOLIC BLOOD PRESSURE: 150 MMHG

## 2020-07-11 LAB
ANION GAP SERPL CALC-SCNC: 4 MMOL/L (ref 6–14)
BASOPHILS # BLD AUTO: 0 X10^3/UL (ref 0–0.2)
BASOPHILS NFR BLD: 0 % (ref 0–3)
BUN SERPL-MCNC: 13 MG/DL (ref 7–20)
CALCIUM SERPL-MCNC: 9.4 MG/DL (ref 8.5–10.1)
CHLORIDE SERPL-SCNC: 107 MMOL/L (ref 98–107)
CO2 SERPL-SCNC: 31 MMOL/L (ref 21–32)
CREAT SERPL-MCNC: 0.5 MG/DL (ref 0.6–1)
EOSINOPHIL NFR BLD: 0.1 X10^3/UL (ref 0–0.7)
EOSINOPHIL NFR BLD: 1 % (ref 0–3)
ERYTHROCYTE [DISTWIDTH] IN BLOOD BY AUTOMATED COUNT: 14.8 % (ref 11.5–14.5)
GFR SERPLBLD BASED ON 1.73 SQ M-ARVRAT: 131.1 ML/MIN
GLUCOSE SERPL-MCNC: 169 MG/DL (ref 70–99)
HCT VFR BLD CALC: 26.6 % (ref 36–47)
HGB BLD-MCNC: 8.8 G/DL (ref 12–15.5)
LYMPHOCYTES # BLD: 0.9 X10^3/UL (ref 1–4.8)
LYMPHOCYTES NFR BLD AUTO: 6 % (ref 24–48)
MCH RBC QN AUTO: 29 PG (ref 25–35)
MCHC RBC AUTO-ENTMCNC: 33 G/DL (ref 31–37)
MCV RBC AUTO: 89 FL (ref 79–100)
MONO #: 1 X10^3/UL (ref 0–1.1)
MONOCYTES NFR BLD: 7 % (ref 0–9)
NEUT #: 13.9 X10^3/UL (ref 1.8–7.7)
NEUTROPHILS NFR BLD AUTO: 87 % (ref 31–73)
PLATELET # BLD AUTO: 728 X10^3/UL (ref 140–400)
POTASSIUM SERPL-SCNC: 4.7 MMOL/L (ref 3.5–5.1)
RBC # BLD AUTO: 2.99 X10^6/UL (ref 3.5–5.4)
SODIUM SERPL-SCNC: 142 MMOL/L (ref 136–145)
WBC # BLD AUTO: 16 X10^3/UL (ref 4–11)

## 2020-07-11 RX ADMIN — FENTANYL CITRATE PRN MCG: 50 INJECTION INTRAMUSCULAR; INTRAVENOUS at 14:09

## 2020-07-11 RX ADMIN — FENTANYL CITRATE PRN MCG: 50 INJECTION INTRAMUSCULAR; INTRAVENOUS at 18:20

## 2020-07-11 RX ADMIN — PANTOPRAZOLE SODIUM SCH MG: 40 INJECTION, POWDER, FOR SOLUTION INTRAVENOUS at 08:03

## 2020-07-11 RX ADMIN — INSULIN LISPRO SCH UNITS: 100 INJECTION, SOLUTION INTRAVENOUS; SUBCUTANEOUS at 11:18

## 2020-07-11 RX ADMIN — BACITRACIN SCH MLS/HR: 5000 INJECTION, POWDER, FOR SOLUTION INTRAMUSCULAR at 14:33

## 2020-07-11 RX ADMIN — IPRATROPIUM BROMIDE AND ALBUTEROL SULFATE SCH ML: .5; 3 SOLUTION RESPIRATORY (INHALATION) at 08:40

## 2020-07-11 RX ADMIN — IPRATROPIUM BROMIDE AND ALBUTEROL SULFATE SCH ML: .5; 3 SOLUTION RESPIRATORY (INHALATION) at 00:00

## 2020-07-11 RX ADMIN — MEROPENEM SCH MLS/HR: 500 INJECTION, POWDER, FOR SOLUTION INTRAVENOUS at 05:34

## 2020-07-11 RX ADMIN — ONDANSETRON PRN MG: 2 INJECTION INTRAMUSCULAR; INTRAVENOUS at 22:53

## 2020-07-11 RX ADMIN — ACETYLCYSTEINE SCH MG: 200 INHALANT RESPIRATORY (INHALATION) at 20:00

## 2020-07-11 RX ADMIN — MEROPENEM SCH MLS/HR: 500 INJECTION, POWDER, FOR SOLUTION INTRAVENOUS at 17:29

## 2020-07-11 RX ADMIN — INSULIN LISPRO SCH UNITS: 100 INJECTION, SOLUTION INTRAVENOUS; SUBCUTANEOUS at 00:00

## 2020-07-11 RX ADMIN — MEROPENEM SCH MLS/HR: 500 INJECTION, POWDER, FOR SOLUTION INTRAVENOUS at 00:40

## 2020-07-11 RX ADMIN — ENOXAPARIN SODIUM SCH MG: 40 INJECTION SUBCUTANEOUS at 08:04

## 2020-07-11 RX ADMIN — IPRATROPIUM BROMIDE AND ALBUTEROL SULFATE SCH ML: .5; 3 SOLUTION RESPIRATORY (INHALATION) at 12:37

## 2020-07-11 RX ADMIN — FENTANYL CITRATE PRN MCG: 50 INJECTION INTRAMUSCULAR; INTRAVENOUS at 09:27

## 2020-07-11 RX ADMIN — IPRATROPIUM BROMIDE AND ALBUTEROL SULFATE SCH ML: .5; 3 SOLUTION RESPIRATORY (INHALATION) at 19:52

## 2020-07-11 RX ADMIN — INSULIN LISPRO SCH UNITS: 100 INJECTION, SOLUTION INTRAVENOUS; SUBCUTANEOUS at 06:17

## 2020-07-11 RX ADMIN — ONDANSETRON PRN MG: 2 INJECTION INTRAMUSCULAR; INTRAVENOUS at 01:30

## 2020-07-11 RX ADMIN — FENTANYL CITRATE PRN MCG: 50 INJECTION INTRAMUSCULAR; INTRAVENOUS at 21:50

## 2020-07-11 RX ADMIN — ACETYLCYSTEINE SCH MG: 200 INHALANT RESPIRATORY (INHALATION) at 08:39

## 2020-07-11 RX ADMIN — METOPROLOL TARTRATE PRN MG: 5 INJECTION INTRAVENOUS at 14:08

## 2020-07-11 RX ADMIN — IPRATROPIUM BROMIDE AND ALBUTEROL SULFATE SCH ML: .5; 3 SOLUTION RESPIRATORY (INHALATION) at 16:06

## 2020-07-11 RX ADMIN — DEXTROSE SCH MLS/HR: 50 INJECTION, SOLUTION INTRAVENOUS at 09:26

## 2020-07-11 RX ADMIN — INSULIN LISPRO SCH UNITS: 100 INJECTION, SOLUTION INTRAVENOUS; SUBCUTANEOUS at 17:29

## 2020-07-11 RX ADMIN — FENTANYL CITRATE PRN MCG: 50 INJECTION INTRAMUSCULAR; INTRAVENOUS at 04:36

## 2020-07-11 RX ADMIN — IPRATROPIUM BROMIDE AND ALBUTEROL SULFATE SCH ML: .5; 3 SOLUTION RESPIRATORY (INHALATION) at 04:00

## 2020-07-11 RX ADMIN — METOPROLOL TARTRATE PRN MG: 5 INJECTION INTRAVENOUS at 08:03

## 2020-07-11 RX ADMIN — ONDANSETRON PRN MG: 2 INJECTION INTRAMUSCULAR; INTRAVENOUS at 14:08

## 2020-07-11 RX ADMIN — MEROPENEM SCH MLS/HR: 500 INJECTION, POWDER, FOR SOLUTION INTRAVENOUS at 11:20

## 2020-07-11 RX ADMIN — Medication PRN EACH: at 13:21

## 2020-07-11 NOTE — NUR
Pharmacy TPN Dosing Note



S: JESENIA BEAN is a 49 year old F Currently receiving Central Continuous TPN started 
03/18/20



B:Pertinent PMH: 

Necrotizing pancreatitis

Height: 5 feet, 8 inches

Weight: 100.0 kg



Current diet: NPO 



LABS:

Sodium:    142 

Potassium: 4.7 

Chloride:  107 

Calcium:   9.4 

Corrected Calcium: 11.72 

Magnesium: 2 

CO2:       31 

SCr:       0.5 

Glucose:   109 

Albumin:   1.1 

AST:       25 

ALT:       37 



TPN FORMULA:

TPN TYPE:  Central Continuous

AMINO ACIDS:         40 gm

DEXTROSE:            225 gm

LIPIDS:              20 gm

SODIUM CHLORIDE:     90 mEq

SODIUM ACETATE:      - mEq

SODIUM PHOSPHATE:    - mmol

POTASSIUM CHLORIDE:  30 mEq

POTASSIUM ACETATE:   30 mEq

POTASSIUM PHOSPHATE: - mmol

MAGNESIUM:           15 mEq

CALCIUM:             - mEq

INSULIN:             15 units

MULTIPLE VITAMIN:    10 ml

TRACE ELEMENTS:      1 ml ml(s)



TPN PLAN:  

continue tpn tonight.





R: Continue TPN AT SAME RATE.

Will monitor electrolytes, glucose, and tolerance to TPN.



 YENIFER STREETER [facility], 07/11/20 [id number]

## 2020-07-11 NOTE — PDOC
PROGRESS NOTES


Chief Complaint


Chief Complaint


A/P


Acute hypoxic Respiratory failure required  mechanical ventilation


Tracheostomy


bilateral pleural effusions/pulm edema s/p Throacentesis on 6/15/2020


Severe Acute gallstone pancreatitis (not a surgical candidate at this time) with

necrosis


Acute kidney failure now requiring dialysis


Gallstones (Calculus of gallbladder with acute cholecystitis without 

obstruction)


HTN 


Intractable pain


Intractable nausea


Covid 19 negative. 


Acute on chronic anemia 


EEG: No seizure activityFever  - better currently - intermittent could be from 

underlying pancreatitis blood cults 5/4 - neg so far


? Ileus with vomiting


Abd distention - U/S and CT reviewed s/p 0.4 L of opaque, debris-containing 

ascites was removed 5/6


Acute pancreatitis with persistent necrosis


Gallstone pancreatitis with necrosis. 


   -CT A/P 6/6 showed multiple pseudocysts, slight larger on the right. s/p 

drains x 3, 6/7.  + PSAE (MDRO-R Cefepime, Zosyn ANSON < 64) and yeast, 


   -s/p drain 4/27. C. parapsilosis. s/p drain 5/6 + yeast & high amylase; s/p 

additional drain on 5/8. Drains removed. 


Ascites s/p paracentesis 4/15 & 5/6. C. parapsilosis 


JED. off HD. 


A large fluid collection in the pancreatic bed has slightly decreased in size, 

described below, the pancreas itself is difficult to  visualize, which could be 

due to necrosis or obscuration of pancreatic  parenchyma from the surrounding 

fluid collection.6/15 


- 4/27 status post ROBERT drain placement + C paropsilosis. s/p additional drains 

5/8


Anemia - S/p PRBCs


Cholelithiasis with thickening of the gallbladder wall.


Leucocytosis improving


JED, hyperkalemia, Metabolic acidosis off dialysis


hypocalcemia 


Prediabetes


HTN


s/p trach


ESRD on HD


Hyperglycemia


severe protein-caloric malnutrition


Moderate to large left pleural effusion with atelectasis and collapse  of most 

of the left lower lobe, stable


 


Dispo - ICU, critically ill


Poor prognosis





History of Present Illness


History of Present Illness


6/8: IR placed drain on 6/7. 4u PRBC after Hb drop. Hb 8.8 today. Off Levophed 

this morning. T-max 100.3. Much more lethargic today. CXR with left sided 

diffuse infiltrates.


6/9: Tachycardic overnight into the 140s. NGT clamped. On BIPAP currently. 

Drains with serosanguinous discharge. WBC 8, Tmax 99.6F.


6/10: Seen on trach shield in ICU.  Hypertensive and tachycardic. Labs stable. 

blood stained drainage from drains. Afebrile.


6/11: Seen on trach shield in ICU. She is a bit confused, drowsy, but when 

sitting up is conversational and confusion somewhat clears. She is asking for 

more pain medication. Stable drains, still very tachy.Na 147


6/12: Patient vomited overnight. Aspirated. Tried to pull her trach out, she was

told she would die without her trach, she said "I know, I just want to go home".

Hb 7.6. Afebrile, still very tachycardic. 1055ml out of right sided ROBERT drain


6/13: Overnight hypoxic, on BIPAP. CXR with left sided white out lung. 

Significant mucous plug suctioned by RT with improvement in her ABG after 2 

hours this morning. Not really active, tired, lethargic. 890ml out of drains 

past 24 hours. On vent. D/w daughter bedside.


6/29: To OR for pancreatic necrosectomy, cholecystectomy, lysis of adhesions, 

gastrostomy tube placement





7/7,  awake on vent, weaning sedation, cont other, still with marked drainage


7/8: Still on fentanyl. WBC 15.4, Hb 8.7 Metabolic panel WNL except calcium 10.2

with albumin 1.1. She awakens off sedation, still connected to vent currently.


7/9: FiO2 40% 5 PEEP, WBC 13.4, Hb 7.9, platelets 551.  Still on fentanyl, she 

is working with PT.


7/10: Off vent during the day. Working with PT. Labs stable. Hb to 8.5. Working 

with PT/OT. She has been suctioning quite a bit of respiratory secretions as 

well as loose bowels. C diff pending.





Afebrile, still with loose bowels overnight C. difficile negative.  WBC up to 16

coughing up thick yellow sputum able to cough out her trach when taken off trach

shield she still has O2 saturations 97%.  Gastrostomy tube to gravity with 

fairly significant drainage that appears to be tube feeds and gastric 

secretions.  She notes pain is better controlled with the fentanyl patch.  Blood

pressure elevated heart rate elevated is on metoprolol.





prognosis still poor, but improving slowly


Cont ICU





Vitals


Vitals





Vital Signs








  Date Time  Temp Pulse Resp B/P (MAP) Pulse Ox O2 Delivery O2 Flow Rate FiO2


 


7/11/20 08:03  126  163/87    


 


7/11/20 06:00   31  100 Trach shield  


 


7/11/20 05:06       10.0 


 


7/11/20 04:00 99.6       





 99.6       











Physical Exam


Physical Exam


GENERAL: Alert, NAD tired appearing but some better


HEENT: Pupils equal, oral cavity dry. + NGT


NECK:  Tracheostomy 


LUNGS: Diminished aeration bases,  CT on left 


HEART:  S1, S2, regular w/ PVCs 


ABDOMEN: Sightly less distended, bowel sounds hypoactive, soft, richardson x 2, 3 ROBERT

drains, G-J tube and + wound vac 


: [place] in place 


EXTREMITIES: Generalized edema, no cyanosis. SCDs & Podus boots bilaterally  


SKIN: warm touch. No signs of rash.  


LUE-PICC without signs of complications 


LUE art-line out, mottling left forearm about ole art-line site is improving. RP

palpable, cap refill brisk.


NEURO: alert and some responsive -  tracking


General:  Cooperative, mild distress


Heart:  Regular rate (SR/ST), Other (distant heart sounds)


Lungs:  Crackles


Abdomen:  Normal bowel sounds, Soft, No tenderness, Other (Multiple drains with 

output no significant changes)


Extremities:  Other (Diffuse edema)


Skin:  No rashes, No significant lesion





Labs


LABS





Laboratory Tests








Test


 7/10/20


18:00 7/11/20


00:38 7/11/20


06:10


 


Glucose (Fingerstick)


 138 mg/dL


(70-99) 147 mg/dL


(70-99) 





 


White Blood Count


 


 


 16.0 x10^3/uL


(4.0-11.0)


 


Red Blood Count


 


 


 2.99 x10^6/uL


(3.50-5.40)


 


Hemoglobin


 


 


 8.8 g/dL


(12.0-15.5)


 


Hematocrit


 


 


 26.6 %


(36.0-47.0)


 


Mean Corpuscular Volume   89 fL () 


 


Mean Corpuscular Hemoglobin   29 pg (25-35) 


 


Mean Corpuscular Hemoglobin


Concent 


 


 33 g/dL


(31-37)


 


Red Cell Distribution Width


 


 


 14.8 %


(11.5-14.5)


 


Platelet Count


 


 


 728 x10^3/uL


(140-400)


 


Neutrophils (%) (Auto)   87 % (31-73) 


 


Lymphocytes (%) (Auto)   6 % (24-48) 


 


Monocytes (%) (Auto)   7 % (0-9) 


 


Eosinophils (%) (Auto)   1 % (0-3) 


 


Basophils (%) (Auto)   0 % (0-3) 


 


Neutrophils # (Auto)


 


 


 13.9 x10^3/uL


(1.8-7.7)


 


Lymphocytes # (Auto)


 


 


 0.9 x10^3/uL


(1.0-4.8)


 


Monocytes # (Auto)


 


 


 1.0 x10^3/uL


(0.0-1.1)


 


Eosinophils # (Auto)


 


 


 0.1 x10^3/uL


(0.0-0.7)


 


Basophils # (Auto)


 


 


 0.0 x10^3/uL


(0.0-0.2)


 


Sodium Level


 


 


 142 mmol/L


(136-145)


 


Potassium Level


 


 


 4.7 mmol/L


(3.5-5.1)


 


Chloride Level


 


 


 107 mmol/L


()


 


Carbon Dioxide Level


 


 


 31 mmol/L


(21-32)


 


Anion Gap   4 (6-14) 


 


Blood Urea Nitrogen


 


 


 13 mg/dL


(7-20)


 


Creatinine


 


 


 0.5 mg/dL


(0.6-1.0)


 


Estimated GFR


(Cockcroft-Gault) 


 


 131.1 





 


Glucose Level


 


 


 169 mg/dL


(70-99)


 


Calcium Level


 


 


 9.4 mg/dL


(8.5-10.1)











Assessment and Plan


Assessmemt and Plan


Problems


Medical Problems:


(1) Acute pancreatitis


Status: Acute  





(2) Cholelithiasis


Status: Acute  











Comment


Review of Relevant


I have reviewed the following items josy (where applicable) has been applied.


Labs





Laboratory Tests








Test


 7/9/20


12:48 7/9/20


23:04 7/10/20


05:30 7/10/20


05:50


 


Glucose (Fingerstick)


 131 mg/dL


(70-99) 138 mg/dL


(70-99) 


 





 


Clostridium difficile Toxin


(PCR) 


 


 Negative


(NEGATIVE) 





 


White Blood Count


 


 


 


 15.4 x10^3/uL


(4.0-11.0)


 


Red Blood Count


 


 


 


 2.86 x10^6/uL


(3.50-5.40)


 


Hemoglobin


 


 


 


 8.4 g/dL


(12.0-15.5)


 


Hematocrit


 


 


 


 25.4 %


(36.0-47.0)


 


Mean Corpuscular Volume    89 fL () 


 


Mean Corpuscular Hemoglobin    29 pg (25-35) 


 


Mean Corpuscular Hemoglobin


Concent 


 


 


 33 g/dL


(31-37)


 


Red Cell Distribution Width


 


 


 


 15.0 %


(11.5-14.5)


 


Platelet Count


 


 


 


 681 x10^3/uL


(140-400)


 


Neutrophils (%) (Auto)    84 % (31-73) 


 


Lymphocytes (%) (Auto)    8 % (24-48) 


 


Monocytes (%) (Auto)    7 % (0-9) 


 


Eosinophils (%) (Auto)    1 % (0-3) 


 


Basophils (%) (Auto)    0 % (0-3) 


 


Neutrophils # (Auto)


 


 


 


 12.9 x10^3/uL


(1.8-7.7)


 


Lymphocytes # (Auto)


 


 


 


 1.2 x10^3/uL


(1.0-4.8)


 


Monocytes # (Auto)


 


 


 


 1.0 x10^3/uL


(0.0-1.1)


 


Eosinophils # (Auto)


 


 


 


 0.2 x10^3/uL


(0.0-0.7)


 


Basophils # (Auto)


 


 


 


 0.1 x10^3/uL


(0.0-0.2)


 


Sodium Level


 


 


 


 143 mmol/L


(136-145)


 


Potassium Level


 


 


 


 4.1 mmol/L


(3.5-5.1)


 


Chloride Level


 


 


 


 107 mmol/L


()


 


Carbon Dioxide Level


 


 


 


 32 mmol/L


(21-32)


 


Anion Gap    4 (6-14) 


 


Blood Urea Nitrogen


 


 


 


 15 mg/dL


(7-20)


 


Creatinine


 


 


 


 0.5 mg/dL


(0.6-1.0)


 


Estimated GFR


(Cockcroft-Gault) 


 


 


 131.1 





 


Glucose Level


 


 


 


 151 mg/dL


(70-99)


 


Calcium Level


 


 


 


 9.9 mg/dL


(8.5-10.1)


 


Test


 7/10/20


06:07 7/10/20


18:00 7/11/20


00:38 7/11/20


06:10


 


Glucose (Fingerstick)


 103 mg/dL


(70-99) 138 mg/dL


(70-99) 147 mg/dL


(70-99) 





 


White Blood Count


 


 


 


 16.0 x10^3/uL


(4.0-11.0)


 


Red Blood Count


 


 


 


 2.99 x10^6/uL


(3.50-5.40)


 


Hemoglobin


 


 


 


 8.8 g/dL


(12.0-15.5)


 


Hematocrit


 


 


 


 26.6 %


(36.0-47.0)


 


Mean Corpuscular Volume    89 fL () 


 


Mean Corpuscular Hemoglobin    29 pg (25-35) 


 


Mean Corpuscular Hemoglobin


Concent 


 


 


 33 g/dL


(31-37)


 


Red Cell Distribution Width


 


 


 


 14.8 %


(11.5-14.5)


 


Platelet Count


 


 


 


 728 x10^3/uL


(140-400)


 


Neutrophils (%) (Auto)    87 % (31-73) 


 


Lymphocytes (%) (Auto)    6 % (24-48) 


 


Monocytes (%) (Auto)    7 % (0-9) 


 


Eosinophils (%) (Auto)    1 % (0-3) 


 


Basophils (%) (Auto)    0 % (0-3) 


 


Neutrophils # (Auto)


 


 


 


 13.9 x10^3/uL


(1.8-7.7)


 


Lymphocytes # (Auto)


 


 


 


 0.9 x10^3/uL


(1.0-4.8)


 


Monocytes # (Auto)


 


 


 


 1.0 x10^3/uL


(0.0-1.1)


 


Eosinophils # (Auto)


 


 


 


 0.1 x10^3/uL


(0.0-0.7)


 


Basophils # (Auto)


 


 


 


 0.0 x10^3/uL


(0.0-0.2)


 


Sodium Level


 


 


 


 142 mmol/L


(136-145)


 


Potassium Level


 


 


 


 4.7 mmol/L


(3.5-5.1)


 


Chloride Level


 


 


 


 107 mmol/L


()


 


Carbon Dioxide Level


 


 


 


 31 mmol/L


(21-32)


 


Anion Gap    4 (6-14) 


 


Blood Urea Nitrogen


 


 


 


 13 mg/dL


(7-20)


 


Creatinine


 


 


 


 0.5 mg/dL


(0.6-1.0)


 


Estimated GFR


(Cockcroft-Gault) 


 


 


 131.1 





 


Glucose Level


 


 


 


 169 mg/dL


(70-99)


 


Calcium Level


 


 


 


 9.4 mg/dL


(8.5-10.1)








Laboratory Tests








Test


 7/10/20


18:00 7/11/20


00:38 7/11/20


06:10


 


Glucose (Fingerstick)


 138 mg/dL


(70-99) 147 mg/dL


(70-99) 





 


White Blood Count


 


 


 16.0 x10^3/uL


(4.0-11.0)


 


Red Blood Count


 


 


 2.99 x10^6/uL


(3.50-5.40)


 


Hemoglobin


 


 


 8.8 g/dL


(12.0-15.5)


 


Hematocrit


 


 


 26.6 %


(36.0-47.0)


 


Mean Corpuscular Volume   89 fL () 


 


Mean Corpuscular Hemoglobin   29 pg (25-35) 


 


Mean Corpuscular Hemoglobin


Concent 


 


 33 g/dL


(31-37)


 


Red Cell Distribution Width


 


 


 14.8 %


(11.5-14.5)


 


Platelet Count


 


 


 728 x10^3/uL


(140-400)


 


Neutrophils (%) (Auto)   87 % (31-73) 


 


Lymphocytes (%) (Auto)   6 % (24-48) 


 


Monocytes (%) (Auto)   7 % (0-9) 


 


Eosinophils (%) (Auto)   1 % (0-3) 


 


Basophils (%) (Auto)   0 % (0-3) 


 


Neutrophils # (Auto)


 


 


 13.9 x10^3/uL


(1.8-7.7)


 


Lymphocytes # (Auto)


 


 


 0.9 x10^3/uL


(1.0-4.8)


 


Monocytes # (Auto)


 


 


 1.0 x10^3/uL


(0.0-1.1)


 


Eosinophils # (Auto)


 


 


 0.1 x10^3/uL


(0.0-0.7)


 


Basophils # (Auto)


 


 


 0.0 x10^3/uL


(0.0-0.2)


 


Sodium Level


 


 


 142 mmol/L


(136-145)


 


Potassium Level


 


 


 4.7 mmol/L


(3.5-5.1)


 


Chloride Level


 


 


 107 mmol/L


()


 


Carbon Dioxide Level


 


 


 31 mmol/L


(21-32)


 


Anion Gap   4 (6-14) 


 


Blood Urea Nitrogen


 


 


 13 mg/dL


(7-20)


 


Creatinine


 


 


 0.5 mg/dL


(0.6-1.0)


 


Estimated GFR


(Cockcroft-Gault) 


 


 131.1 





 


Glucose Level


 


 


 169 mg/dL


(70-99)


 


Calcium Level


 


 


 9.4 mg/dL


(8.5-10.1)








Microbiology


6/30/20 Gram Stain - Final, Complete


          


6/30/20 Aerobic and Anaerobic Culture - Final, Complete


          


6/30/20 Antimicrobic Susceptibility - Final, Complete


          


6/28/20 Blood Culture - Final, Complete


          NO GROWTH AFTER 5 DAYS


6/15/20 Gram Stain - Final, Complete


          


6/15/20 Aerobic and Anaerobic Culture - Final, Complete


          


6/13/20 Gram Stain Evaluation - Final, Complete


          


6/13/20 Respiratory Culture - Final, Complete


          


6/13/20 Antimicrobic Susceptibility - Final, Complete


          


6/7/20 Urine Culture - Final, Complete


         


5/30/20 Gram Stain - Final, Complete


          


5/30/20 Aerobic Culture - Final, Complete


Medications





Current Medications


Sodium Chloride 1,000 ml @  1,000 mls/hr Q1H IV  Last administered on 3/16/20at 

03:00;  Start 3/16/20 at 03:00;  Stop 3/16/20 at 03:59;  Status DC


Ondansetron HCl (Zofran) 4 mg 1X  ONCE IVP  Last administered on 3/16/20at 

03:27;  Start 3/16/20 at 03:00;  Stop 3/16/20 at 03:01;  Status DC


Morphine Sulfate (Morphine Sulfate) 4 mg 1X  ONCE IV ;  Start 3/16/20 at 03:00; 

Stop 3/16/20 at 03:01;  Status Cancel


Ketorolac Tromethamine (Toradol 30mg Vial) 30 mg 1X  ONCE IV  Last administered 

on 3/16/20at 02:54;  Start 3/16/20 at 03:00;  Stop 3/16/20 at 03:01;  Status DC


Fentanyl Citrate (Fentanyl 2ml Vial) 25 mcg 1X  ONCE IVP  Last administered on 

3/16/20at 03:23;  Start 3/16/20 at 03:30;  Stop 3/16/20 at 03:31;  Status DC


Fentanyl Citrate (Fentanyl 2ml Vial) 100 mcg STK-MED ONCE .ROUTE ;  Start 

3/16/20 at 03:18;  Stop 3/16/20 at 03:18;  Status DC


Iohexol (Omnipaque 350 Mg/ml) 90 ml 1X  ONCE IV  Last administered on 3/16/20at 

03:25;  Start 3/16/20 at 03:30;  Stop 3/16/20 at 03:31;  Status DC


Info (CONTRAST GIVEN -- Rx MONITORING) 1 each PRN DAILY  PRN MC SEE COMMENTS;  

Start 3/16/20 at 03:30;  Stop 3/18/20 at 03:29;  Status DC


Hydromorphone HCl (Dilaudid) 0.5 mg 1X  ONCE IV  Last administered on 3/16/20at 

03:55;  Start 3/16/20 at 04:30;  Stop 3/16/20 at 04:32;  Status DC


Ondansetron HCl (Zofran) 4 mg PRN Q8HRS  PRN IV NAUSEA/VOMITING 1ST CHOICE;  

Start 3/16/20 at 05:00;  Stop 3/16/20 at 09:27;  Status DC


Morphine Sulfate (Morphine Sulfate) 2 mg PRN Q2HR  PRN IV SEVERE PAIN 7-10 Last 

administered on 3/17/20at 12:26;  Start 3/16/20 at 05:00;  Stop 3/17/20 at 

14:15;  Status DC


Sodium Chloride 1,000 ml @  125 mls/hr Q8H IV  Last administered on 3/16/20at 

20:56;  Start 3/16/20 at 05:00;  Stop 3/17/20 at 04:59;  Status DC


Hydromorphone HCl (Dilaudid) 0.5 mg PRN Q3HRS  PRN IV SEVERE PAIN 7-10 Last 

administered on 3/17/20at 10:06;  Start 3/16/20 at 05:00;  Stop 3/17/20 at 12:0

1;  Status DC


Piperacillin Sod/ Tazobactam Sod 4.5 gm/Sodium Chloride 100 ml @  200 mls/hr 1X 

ONCE IV  Last administered on 3/16/20at 05:44;  Start 3/16/20 at 06:00;  Stop 

3/16/20 at 06:29;  Status DC


Ondansetron HCl (Zofran) 4 mg PRN Q4HRS  PRN IV NAUSEA/VOMITING 1ST CHOICE Last 

administered on 7/11/20at 01:30;  Start 3/16/20 at 09:30


Insulin Human Lispro (HumaLOG) 0-9 UNITS Q6HRS SQ  Last administered on 

7/11/20at 06:17;  Start 3/16/20 at 09:30


Dextrose (Dextrose 50%-Water Syringe) 12.5 gm PRN Q15MIN  PRN IV SEE COMMENTS;  

Start 3/16/20 at 09:30


Pantoprazole Sodium (PROTONIX VIAL for IV PUSH) 40 mg DAILYAC IVP  Last 

administered on 7/11/20at 08:03;  Start 3/16/20 at 11:30


Prochlorperazine Edisylate (Compazine) 10 mg PRN Q6HRS  PRN IV NAUSEA/VOMITING, 

2nd CHOICE Last administered on 6/27/20at 10:53;  Start 3/16/20 at 17:45


Atenolol (Tenormin) 100 mg DAILY PO ;  Start 3/17/20 at 09:00;  Stop 3/16/20 at 

20:08;  Status DC


Metoprolol Tartrate (Lopressor Vial) 2.5 mg Q6HRS IVP  Last administered on 

3/17/20at 05:51;  Start 3/16/20 at 20:15;  Stop 3/17/20 at 10:02;  Status DC


Metoprolol Tartrate (Lopressor Vial) 5 mg Q6HRS IVP  Last administered on 

3/26/20at 00:12;  Start 3/17/20 at 10:15;  Stop 3/28/20 at 08:48;  Status DC


Hydromorphone HCl (Dilaudid) 1 mg PRN Q3HRS  PRN IV SEVERE PAIN 7-10 Last 

administered on 3/23/20at 05:13;  Start 3/17/20 at 12:00;  Stop 3/31/20 at 

00:25;  Status DC


Lidocaine HCl (Buffered Lidocaine 1%) 3 ml STK-MED ONCE .ROUTE ;  Start 3/17/20 

at 12:55;  Stop 3/17/20 at 12:56;  Status DC


Albumin Human 500 ml @  125 mls/hr 1X  ONCE IV  Last administered on 3/17/20at 

14:33;  Start 3/17/20 at 14:30;  Stop 3/17/20 at 18:32;  Status DC


Norepinephrine Bitartrate 8 mg/ Dextrose 258 ml @  17.299 mls/ hr CONT  PRN IV 

PER PROTOCOL Last administered on 4/14/20at 12:48;  Start 3/17/20 at 15:30;  

Stop 4/17/20 at 09:19;  Status DC


Sodium Chloride 1,000 ml @  125 mls/hr Q8H IV  Last administered on 3/17/20at 

21:04;  Start 3/17/20 at 16:00;  Stop 3/18/20 at 02:42;  Status DC


Albumin Human 500 ml @  125 mls/hr PRN BID  PRN IV After every 2L NSS & BP < 

90mm Last administered on 6/30/20at 16:06;  Start 3/17/20 at 16:00;  Stop 7/3/20

at 09:30;  Status DC


Iohexol (Omnipaque 300 Mg/ml) 60 ml 1X  ONCE IV  Last administered on 3/17/20at 

17:20;  Start 3/17/20 at 17:00;  Stop 3/17/20 at 17:01;  Status DC


Info (CONTRAST GIVEN -- Rx MONITORING) 1 each PRN DAILY  PRN MC SEE COMMENTS;  

Start 3/17/20 at 17:00;  Stop 3/19/20 at 16:59;  Status DC


Meropenem 1 gm/ Sodium Chloride 100 ml @  200 mls/hr Q8HRS IV  Last administered

on 3/18/20at 05:45;  Start 3/17/20 at 20:00;  Stop 3/18/20 at 08:48;  Status DC


Furosemide (Lasix) 40 mg 1X  ONCE IVP  Last administered on 3/17/20at 22:12;  

Start 3/17/20 at 22:30;  Stop 3/17/20 at 22:31;  Status DC


Calcium Chloride 1000 mg/Sodium Chloride 110 ml @  220 mls/hr 1X  ONCE IV  Last 

administered on 3/17/20at 22:11;  Start 3/17/20 at 22:30;  Stop 3/17/20 at 

22:59;  Status DC


Albuterol Sulfate (Ventolin Neb Soln) 2.5 mg 1X  ONCE NEB  Last administered on 

3/18/20at 00:56;  Start 3/17/20 at 22:30;  Stop 3/17/20 at 22:31;  Status DC


Insulin Human Regular (HumuLIN R VIAL) 5 unit 1X  ONCE IV  Last administered on 

3/17/20at 22:14;  Start 3/17/20 at 22:30;  Stop 3/17/20 at 22:31;  Status DC


Magnesium Sulfate 50 ml @ 25 mls/hr 1X  ONCE IV  Last administered on 3/18/20at 

02:57;  Start 3/18/20 at 03:00;  Stop 3/18/20 at 04:59;  Status DC


Calcium Gluconate 1000 mg/Sodium Chloride 110 ml @  220 mls/hr 1X  ONCE IV  Last

administered on 3/18/20at 02:46;  Start 3/18/20 at 03:00;  Stop 3/18/20 at 

03:29;  Status DC


Sodium Chloride 1,000 ml @  200 mls/hr Q5H IV  Last administered on 3/18/20at 

02:46;  Start 3/18/20 at 03:00;  Stop 3/18/20 at 10:21;  Status DC


Calcium Gluconate 1000 mg/Sodium Chloride 110 ml @  220 mls/hr 1X  ONCE IV  Last

administered on 3/18/20at 03:21;  Start 3/18/20 at 03:30;  Stop 3/18/20 at 

03:59;  Status DC


Sodium Bicarbonate 50 meq/Sodium Chloride 1,050 ml @  75 mls/hr Q14H IV  Last 

administered on 3/22/20at 21:10;  Start 3/18/20 at 07:30;  Stop 3/23/20 at 

10:28;  Status DC


Calcium Gluconate 2000 mg/Sodium Chloride 120 ml @  220 mls/hr 1X  ONCE IV  Last

administered on 3/18/20at 09:05;  Start 3/18/20 at 07:30;  Stop 3/18/20 at 

08:02;  Status DC


Lidocaine HCl (Xylocaine-Mpf 1% 2ml Vial) 2 ml STK-MED ONCE .ROUTE ;  Start 

3/18/20 at 08:47;  Stop 3/18/20 at 08:47;  Status DC


Meropenem 500 mg/ Sodium Chloride 50 ml @  100 mls/hr Q12HR IV  Last 

administered on 3/23/20at 21:01;  Start 3/18/20 at 18:00;  Stop 3/24/20 at 

07:58;  Status DC


Lidocaine HCl (Buffered Lidocaine 1%) 3 ml STK-MED ONCE .ROUTE ;  Start 3/18/20 

at 09:46;  Stop 3/18/20 at 09:46;  Status DC


Lidocaine HCl (Buffered Lidocaine 1%) 6 ml 1X  ONCE INJ  Last administered on 

3/18/20at 10:26;  Start 3/18/20 at 10:15;  Stop 3/18/20 at 10:16;  Status DC


Info (Tpn Per Pharmacy) 1 each PRN DAILY  PRN MC SEE COMMENTS Last administered 

on 7/10/20at 10:31;  Start 3/18/20 at 12:00


Sodium Chloride 1,000 ml @  1,000 mls/hr Q1H PRN IV hypotension;  Start 3/18/20 

at 12:07;  Stop 3/18/20 at 18:06;  Status DC


Diphenhydramine HCl (Benadryl) 25 mg 1X PRN  PRN IV ITCHING;  Start 3/18/20 at 

12:15;  Stop 3/19/20 at 12:14;  Status DC


Diphenhydramine HCl (Benadryl) 25 mg 1X PRN  PRN IV ITCHING;  Start 3/18/20 at 

12:15;  Stop 3/19/20 at 12:14;  Status DC


Sodium Chloride 1,000 ml @  400 mls/hr Q2H30M PRN IV PATENCY;  Start 3/18/20 at 

12:07;  Stop 3/19/20 at 00:06;  Status DC


Info (PHARMACY MONITORING -- do not chart) 1 each PRN DAILY  PRN MC SEE CO

MMENTS;  Start 3/18/20 at 12:15;  Stop 3/20/20 at 08:13;  Status DC


Sodium Chloride 90 meq/Calcium Gluconate 10 meq/ Multivitamins 10 ml/Chromium/ 

Copper/Manganese/ Seleni/Zn 1 ml/ Total Parenteral Nutrition/Amino 

Acids/Dextrose/ Fat Emulsion Intravenous 55.005 ml  @ 2.292 mls/hr TPN  CONT IV 

;  Start 3/18/20 at 22:00;  Stop 3/18/20 at 12:33;  Status DC


Info (Tpn Per Pharmacy) 1 each PRN DAILY  PRN MC SEE COMMENTS;  Start 3/18/20 at

12:30;  Status UNV


Sodium Chloride 90 meq/Calcium Gluconate 10 meq/ Multivitamins 10 ml/Chromium/ 

Copper/Manganese/ Seleni/Zn 0.5 ml/ Total Parenteral Nutrition/Amino 

Acids/Dextrose/ Fat Emulsion Intravenous 1,512 ml @  63 mls/hr TPN  CONT IV  

Last administered on 3/18/20at 22:06;  Start 3/18/20 at 22:00;  Stop 3/19/20 at 

21:59;  Status DC


Calcium Carbonate/ Glycine (Tums) 500 mg PRN AFTMEALHC  PRN PO INDIGESTION;  

Start 3/18/20 at 17:45;  Stop 5/13/20 at 10:25;  Status DC


Calcium Gluconate (Calcium Gluconate) 2,000 mg 1X  ONCE IVP  Last administered 

on 3/19/20at 02:19;  Start 3/19/20 at 02:15;  Stop 3/19/20 at 02:16;  Status DC


Calcium Chloride 3000 mg/Sodium Chloride 1,030 ml @  50 mls/hr [id number] IV  Last 

administered on 3/21/20at 02:17;  Start 3/19/20 at 08:00;  Stop 3/21/20 at 

15:23;  Status DC


Lorazepam (Ativan Inj) 1 mg PRN Q4HRS  PRN IVP ANXIETY / AGITATION, 2nd choic 

Last administered on 4/17/20at 03:51;  Start 3/19/20 at 09:00;  Stop 4/17/20 at 

09:19;  Status DC


Sodium Chloride 1,000 ml @  1,000 mls/hr Q1H PRN IV hypotension;  Start 3/19/20 

at 08:56;  Stop 3/19/20 at 14:55;  Status DC


Albumin Human 200 ml @  200 mls/hr 1X PRN  PRN IV Hypotension;  Start 3/19/20 at

09:00;  Stop 3/19/20 at 14:59;  Status DC


Diphenhydramine HCl (Benadryl) 25 mg 1X PRN  PRN IV ITCHING;  Start 3/19/20 at 

09:00;  Stop 3/20/20 at 08:59;  Status DC


Diphenhydramine HCl (Benadryl) 25 mg 1X PRN  PRN IV ITCHING;  Start 3/19/20 at 

09:00;  Stop 3/20/20 at 08:59;  Status DC


Sodium Chloride 1,000 ml @  400 mls/hr Q2H30M PRN IV PATENCY;  Start 3/19/20 at 

08:56;  Stop 3/19/20 at 20:55;  Status DC


Info (PHARMACY MONITORING -- do not chart) 1 each PRN DAILY  PRN MC SEE 

COMMENTS;  Start 3/19/20 at 09:00;  Status UNV


Info (PHARMACY MONITORING -- do not chart) 1 each PRN DAILY  PRN MC SEE 

COMMENTS;  Start 3/19/20 at 09:00;  Stop 3/20/20 at 08:13;  Status DC


Digoxin (Lanoxin) 500 mcg 1X  ONCE IV  Last administered on 3/19/20at 10:04;  

Start 3/19/20 at 10:00;  Stop 3/19/20 at 10:01;  Status DC


Digoxin (Lanoxin) 125 mcg 1X  ONCE IV  Last administered on 3/19/20at 17:10;  

Start 3/19/20 at 18:00;  Stop 3/19/20 at 18:01;  Status DC


Magnesium Sulfate 100 ml @  25 mls/hr 1X  ONCE IV  Last administered on 

3/19/20at 12:48;  Start 3/19/20 at 13:00;  Stop 3/19/20 at 16:59;  Status DC


Sodium Chloride 90 meq/Magnesium Sulfate 10 meq/ Calcium Gluconate 20 meq/ 

Multivitamins 10 ml/Chromium/ Copper/Manganese/ Seleni/Zn 0.5 ml/ Total 

Parenteral Nutrition/Amino Acids/Dextrose/ Fat Emulsion Intravenous 1,512 ml @  

63 mls/hr TPN  CONT IV  Last administered on 3/19/20at 22:25;  Start 3/19/20 at 

22:00;  Stop 3/20/20 at 21:59;  Status DC


Sodium Chloride 1,000 ml @  1,000 mls/hr Q1H PRN IV hypotension;  Start 3/20/20 

at 08:05;  Stop 3/20/20 at 14:04;  Status DC


Albumin Human 200 ml @  200 mls/hr 1X  ONCE IV  Last administered on 3/20/20at 

08:57;  Start 3/20/20 at 08:15;  Stop 3/20/20 at 09:14;  Status DC


Diphenhydramine HCl (Benadryl) 25 mg 1X PRN  PRN IV ITCHING;  Start 3/20/20 at 

08:15;  Stop 3/21/20 at 08:14;  Status DC


Diphenhydramine HCl (Benadryl) 25 mg 1X PRN  PRN IV ITCHING;  Start 3/20/20 at 

08:15;  Stop 3/21/20 at 08:14;  Status DC


Sodium Chloride 1,000 ml @  400 mls/hr Q2H30M PRN IV PATENCY;  Start 3/20/20 at 

08:05;  Stop 3/20/20 at 20:04;  Status DC


Info (PHARMACY MONITORING -- do not chart) 1 each PRN DAILY  PRN MC SEE 

COMMENTS;  Start 3/20/20 at 08:15;  Stop 3/24/20 at 07:57;  Status DC


Sodium Chloride 90 meq/Potassium Chloride 15 meq/ Potassium Phosphate 10 mmol/ 

Magnesium Sulfate 10 meq/Calcium Gluconate 20 meq/ Multivitamins 10 ml/Chromium/

Copper/Manganese/ Seleni/Zn 0.5 ml/ Total Parenteral Nutrition/Amino 

Acids/Dextrose/ Fat Emulsion Intravenous 1,512 ml @  63 mls/hr TPN  CONT IV  

Last administered on 3/20/20at 21:01;  Start 3/20/20 at 22:00;  Stop 3/21/20 at 

21:59;  Status DC


Potassium Chloride/Water 100 ml @  100 mls/hr 1X  ONCE IV  Last administered on 

3/20/20at 14:09;  Start 3/20/20 at 14:00;  Stop 3/20/20 at 14:59;  Status DC


Benzocaine (Hurricaine One) 1 spray 1X  ONCE MM  Last administered on 3/20/20at 

16:38;  Start 3/20/20 at 14:30;  Stop 3/20/20 at 14:31;  Status DC


Lidocaine HCl (Glydo (Lidocaine) Jelly) 1 thomas 1X  ONCE MM  Last administered on 

3/20/20at 16:38;  Start 3/20/20 at 14:30;  Stop 3/20/20 at 14:31;  Status DC


Linezolid/Dextrose 300 ml @  300 mls/hr Q12HR IV  Last administered on 3/26/20at

21:04;  Start 3/20/20 at 20:00;  Stop 3/27/20 at 07:50;  Status DC


Acetaminophen (Tylenol) 650 mg PRN Q6HRS  PRN PO MILD PAIN / TEMP;  Start 

3/21/20 at 03:30;  Stop 3/21/20 at 03:36;  Status DC


Acetaminophen (Tylenol) 650 mg PRN Q6HRS  PRN PEG MILD PAIN / TEMP Last 

administered on 4/16/20at 19:56;  Start 3/21/20 at 03:36;  Stop 5/13/20 at 

10:25;  Status DC


Sodium Chloride 1,000 ml @  1,000 mls/hr Q1H PRN IV hypotension;  Start 3/21/20 

at 07:50;  Stop 3/21/20 at 13:49;  Status DC


Albumin Human 200 ml @  200 mls/hr 1X PRN  PRN IV Hypotension;  Start 3/21/20 at

08:00;  Stop 3/21/20 at 13:59;  Status DC


Sodium Chloride (Normal Saline Flush) 10 ml 1X PRN  PRN IV AP catheter pack;  

Start 3/21/20 at 08:00;  Stop 3/22/20 at 07:59;  Status DC


Sodium Chloride (Normal Saline Flush) 10 ml 1X PRN  PRN IV  catheter pack;  

Start 3/21/20 at 08:00;  Stop 3/22/20 at 07:59;  Status DC


Sodium Chloride 1,000 ml @  400 mls/hr Q2H30M PRN IV PATENCY;  Start 3/21/20 at 

07:50;  Stop 3/21/20 at 19:49;  Status DC


Info (PHARMACY MONITORING -- do not chart) 1 each PRN DAILY  PRN MC SEE 

COMMENTS;  Start 3/21/20 at 08:00;  Status UNV


Info (PHARMACY MONITORING -- do not chart) 1 each PRN DAILY  PRN MC SEE 

COMMENTS;  Start 3/21/20 at 08:00;  Stop 3/23/20 at 08:25;  Status DC


Sodium Chloride 90 meq/Potassium Chloride 15 meq/ Potassium Phosphate 10 mmol/ 

Magnesium Sulfate 10 meq/Calcium Gluconate 20 meq/ Multivitamins 10 ml/Chromium/

Copper/Manganese/ Seleni/Zn 0.5 ml/ Total Parenteral Nutrition/Amino 

Acids/Dextrose/ Fat Emulsion Intravenous 1,512 ml @  63 mls/hr TPN  CONT IV  

Last administered on 3/21/20at 20:57;  Start 3/21/20 at 22:00;  Stop 3/22/20 at 

21:59;  Status DC


Sodium Chloride 90 meq/Potassium Chloride 15 meq/ Potassium Phosphate 15 mmol/ 

Magnesium Sulfate 10 meq/Calcium Gluconate 20 meq/ Multivitamins 10 ml/Chromium/

Copper/Manganese/ Seleni/Zn 0.5 ml/ Total Parenteral Nutrition/Amino 

Acids/Dextrose/ Fat Emulsion Intravenous 1,512 ml @  63 mls/hr TPN  CONT IV ;  

Start 3/22/20 at 22:00;  Stop 3/22/20 at 14:16;  Status DC


Sodium Chloride 90 meq/Potassium Chloride 15 meq/ Potassium Phosphate 15 mmol/ 

Magnesium Sulfate 10 meq/Calcium Gluconate 20 meq/ Multivitamins 10 ml/Chromium/

Copper/Manganese/ Seleni/Zn 0.5 ml/ Total Parenteral Nutrition/Amino 

Acids/Dextrose/ Fat Emulsion Intravenous 1,200 ml @  50 mls/hr TPN  CONT IV ;  

Start 3/22/20 at 22:00;  Stop 3/22/20 at 14:17;  Status DC


Sodium Chloride 90 meq/Potassium Chloride 15 meq/ Potassium Phosphate 10 mmol/ 

Magnesium Sulfate 10 meq/Calcium Gluconate 20 meq/ Multivitamins 10 ml/Chromium/

Copper/Manganese/ Seleni/Zn 0.5 ml/ Total Parenteral Nutrition/Amino Ac

ids/Dextrose/ Fat Emulsion Intravenous 1,200 ml @  50 mls/hr TPN  CONT IV  Last 

administered on 3/22/20at 23:29;  Start 3/22/20 at 22:00;  Stop 3/23/20 at 

21:59;  Status DC


Sodium Chloride 1,000 ml @  1,000 mls/hr Q1H PRN IV hypotension;  Start 3/23/20 

at 07:28;  Stop 3/23/20 at 13:27;  Status DC


Albumin Human 200 ml @  200 mls/hr 1X  ONCE IV  Last administered on 3/23/20at 

08:51;  Start 3/23/20 at 07:30;  Stop 3/23/20 at 08:29;  Status DC


Diphenhydramine HCl (Benadryl) 25 mg 1X PRN  PRN IV ITCHING;  Start 3/23/20 at 

07:30;  Stop 3/24/20 at 07:29;  Status DC


Diphenhydramine HCl (Benadryl) 25 mg 1X PRN  PRN IV ITCHING;  Start 3/23/20 at 

07:30;  Stop 3/24/20 at 07:29;  Status DC


Sodium Chloride 1,000 ml @  400 mls/hr Q2H30M PRN IV PATENCY;  Start 3/23/20 at 

07:28;  Stop 3/23/20 at 19:27;  Status DC


Info (PHARMACY MONITORING -- do not chart) 1 each PRN DAILY  PRN MC SEE 

COMMENTS;  Start 3/23/20 at 07:30;  Stop 4/3/20 at 13:01;  Status DC


Metronidazole 100 ml @  100 mls/hr Q6HRS IV  Last administered on 4/8/20at 

06:26;  Start 3/23/20 at 08:30;  Stop 4/8/20 at 09:58;  Status DC


Micafungin Sodium 100 mg/Dextrose 100 ml @  100 mls/hr Q24H IV  Last 

administered on 4/30/20at 08:18;  Start 3/23/20 at 09:00;  Stop 4/30/20 at 

20:58;  Status DC


Propofol 0 ml @ As Directed STK-MED ONCE IV ;  Start 3/23/20 at 07:53;  Stop 

3/23/20 at 07:53;  Status DC


Etomidate (Amidate) 20 mg STK-MED ONCE IV ;  Start 3/23/20 at 07:53;  Stop 

3/23/20 at 07:54;  Status DC


Midazolam HCl (Versed) 5 mg STK-MED ONCE .ROUTE ;  Start 3/23/20 at 07:57;  Stop

3/23/20 at 07:57;  Status DC


Fentanyl Citrate 30 ml @ 0 mls/hr CONT  PRN IV SEE PROTOCOL Last administered on

4/17/20at 06:12;  Start 3/23/20 at 08:15;  Stop 4/17/20 at 09:19;  Status DC


Artificial Tears (Artificial Tears) 1 drop PRN Q1HR  PRN OU DRY EYE, 1st choice;

 Start 3/23/20 at 08:15;  Stop 4/29/20 at 05:31;  Status DC


Midazolam HCl 50 mg/Sodium Chloride 50 ml @ 0 mls/hr CONT  PRN IV SEE PROTOCOL 

Last administered on 3/26/20at 22:39;  Start 3/23/20 at 08:15;  Stop 3/28/20 at 

15:59;  Status DC


Etomidate (Amidate) 8 mg 1X  ONCE IV  Last administered on 3/23/20at 08:33;  

Start 3/23/20 at 08:30;  Stop 3/23/20 at 08:31;  Status DC


Succinylcholine Chloride (Anectine) 120 mg 1X  ONCE IV  Last administered on 

3/23/20at 08:34;  Start 3/23/20 at 08:30;  Stop 3/23/20 at 08:31;  Status DC


Midazolam HCl (Versed) 5 mg 1X  ONCE IV ;  Start 3/23/20 at 08:30;  Stop 3/23/20

at 08:31;  Status DC


Potassium Chloride 15 meq/ Bicarbonate Dialysis Soln w/ out KCl 5,007.5 ml  @ 

1,000 mls/ hr Q5H1M IV  Last administered on 3/24/20at 11:11;  Start 3/23/20 at 

12:00;  Stop 3/24/20 at 11:15;  Status DC


Potassium Chloride 15 meq/ Bicarbonate Dialysis Soln w/ out KCl 5,007.5 ml  @ 

1,000 mls/ hr Q5H1M IV  Last administered on 3/24/20at 11:12;  Start 3/23/20 at 

12:00;  Stop 3/24/20 at 11:17;  Status DC


Potassium Chloride 15 meq/ Bicarbonate Dialysis Soln w/ out KCl 5,007.5 ml  @ 

1,000 mls/ hr Q5H1M IV  Last administered on 3/24/20at 11:11;  Start 3/23/20 at 

12:00;  Stop 3/24/20 at 11:19;  Status DC


Sodium Chloride 90 meq/Potassium Chloride 15 meq/ Potassium Phosphate 10 mmol/ 

Magnesium Sulfate 10 meq/Calcium Gluconate 20 meq/ Multivitamins 10 ml/Chromium/

Copper/Manganese/ Seleni/Zn 0.5 ml/ Total Parenteral Nutrition/Amino 

Acids/Dextrose/ Fat Emulsion Intravenous 1,400 ml @  58.333 mls/ hr TPN  CONT IV

 Last administered on 3/23/20at 21:42;  Start 3/23/20 at 22:00;  Stop 3/24/20 at

21:59;  Status DC


Heparin Sodium (Porcine) (Heparin Sodium) 5,000 unit Q8HRS SQ  Last administered

on 3/28/20at 05:55;  Start 3/23/20 at 15:00;  Stop 3/28/20 at 13:28;  Status DC


Meropenem 500 mg/ Sodium Chloride 50 ml @  100 mls/hr Q6HRS IV  Last 

administered on 3/25/20at 06:00;  Start 3/24/20 at 09:00;  Stop 3/25/20 at 

07:29;  Status DC


Potassium Phosphate 20 mmol/ Sodium Chloride 106.6667 ml @  51.667 m... 1X  ONCE

IV  Last administered on 3/24/20at 11:22;  Start 3/24/20 at 10:15;  Stop 3/24/20

at 12:18;  Status DC


Acetaminophen (Tylenol Supp) 650 mg PRN Q6HRS  PRN NH MILD PAIN / TEMP > 100.3'F

Last administered on 6/29/20at 18:16;  Start 3/24/20 at 10:30


Potassium Chloride/Water 100 ml @  100 mls/hr Q1H IV  Last administered on 

3/24/20at 12:12;  Start 3/24/20 at 11:00;  Stop 3/24/20 at 12:59;  Status DC


Potassium Chloride 20 meq/ Bicarbonate Dialysis Soln w/ out KCl 5,010 ml @  

1,000 mls/hr Q5H1M IV  Last administered on 3/25/20at 08:48;  Start 3/24/20 at 

12:00;  Stop 3/25/20 at 13:03;  Status DC


Potassium Chloride 20 meq/ Bicarbonate Dialysis Soln w/ out KCl 5,010 ml @  

1,000 mls/hr Q5H1M IV  Last administered on 3/29/20at 14:52;  Start 3/24/20 at 

11:30;  Stop 3/29/20 at 19:59;  Status DC


Potassium Chloride 20 meq/ Bicarbonate Dialysis Soln w/ out KCl 5,010 ml @  

1,000 mls/hr Q5H1M IV  Last administered on 3/29/20at 14:53;  Start 3/24/20 at 

11:30;  Stop 3/29/20 at 19:59;  Status DC


Sodium Chloride 90 meq/Potassium Chloride 15 meq/ Potassium Phosphate 15 mmol/ 

Magnesium Sulfate 10 meq/Calcium Gluconate 15 meq/ Multivitamins 10 ml/Chromium/

Copper/Manganese/ Seleni/Zn 0.5 ml/ Total Parenteral Nutrition/Amino 

Acids/Dextrose/ Fat Emulsion Intravenous 1,400 ml @  58.333 mls/ hr TPN  CONT IV

 Last administered on 3/24/20at 22:17;  Start 3/24/20 at 22:00;  Stop 3/25/20 at

21:59;  Status DC


Cefepime HCl (Maxipime) 2 gm Q12HR IVP  Last administered on 4/7/20at 20:56;  

Start 3/25/20 at 09:00;  Stop 4/8/20 at 09:58;  Status DC


Daptomycin 500 mg/ Sodium Chloride 50 ml @  100 mls/hr Q48H IV  Last 

administered on 4/10/20at 09:57;  Start 3/25/20 at 08:30;  Stop 4/10/20 at 

10:07;  Status DC


Lidocaine HCl (Buffered Lidocaine 1%) 3 ml 1X  ONCE INJ  Last administered on 

3/25/20at 10:27;  Start 3/25/20 at 10:30;  Stop 3/25/20 at 10:31;  Status DC


Potassium Phosphate 20 mmol/ Sodium Chloride 106.6667 ml @  51.667 m... 1X  ONCE

IV  Last administered on 3/25/20at 12:51;  Start 3/25/20 at 13:00;  Stop 3/25/20

at 15:03;  Status DC


Sodium Chloride 90 meq/Potassium Chloride 15 meq/ Potassium Phosphate 18 mmol/ 

Magnesium Sulfate 8 meq/Calcium Gluconate 15 meq/ Multivitamins 10 ml/Chromium/ 

Copper/Manganese/ Seleni/Zn 0.5 ml/ Total Parenteral Nutrition/Amino 

Acids/Dextrose/ Fat Emulsion Intravenous 1,400 ml @  58.333 mls/ hr TPN  CONT IV

 Last administered on 3/25/20at 22:16;  Start 3/25/20 at 22:00;  Stop 3/26/20 at

21:59;  Status DC


Potassium Chloride 20 meq/ Bicarbonate Dialysis Soln w/ out KCl 5,010 ml @  

1,000 mls/hr Q5H1M IV  Last administered on 3/29/20at 14:54;  Start 3/25/20 at 

16:00;  Stop 3/29/20 at 19:59;  Status DC


Multi-Ingred Cream/Lotion/Oil/ Oint (Artificial Tears Eye Ointment) 1 thomas PRN 

Q1HR  PRN OU DRY EYE, 2nd choice Last administered on 4/13/20at 08:19;  Start 

3/25/20 at 17:30;  Stop 6/3/20 at 14:39;  Status DC


Sodium Chloride 90 meq/Potassium Chloride 15 meq/ Potassium Phosphate 18 mmol/ 

Magnesium Sulfate 8 meq/Calcium Gluconate 15 meq/ Multivitamins 10 ml/Chromium/ 

Copper/Manganese/ Seleni/Zn 0.5 ml/ Total Parenteral Nutrition/Amino 

Acids/Dextrose/ Fat Emulsion Intravenous 1,400 ml @  58.333 mls/ hr TPN  CONT IV

 Last administered on 3/26/20at 22:00;  Start 3/26/20 at 22:00;  Stop 3/27/20 at

21:59;  Status DC


Albumin Human 500 ml @  125 mls/hr 1X  ONCE IV ;  Start 3/26/20 at 14:15;  Stop 

3/26/20 at 18:14;  Status DC


Sodium Chloride 90 meq/Potassium Chloride 15 meq/ Potassium Phosphate 18 mmol/ M

agnesium Sulfate 8 meq/Calcium Gluconate 15 meq/ Multivitamins 10 ml/Chromium/ 

Copper/Manganese/ Seleni/Zn 0.5 ml/ Insulin Human Regular 10 unit/ Total 

Parenteral Nutrition/Amino Acids/Dextrose/ Fat Emulsion Intravenous 1,400 ml @  

58.333 mls/ hr TPN  CONT IV  Last administered on 3/27/20at 21:43;  Start 

3/27/20 at 22:00;  Stop 3/28/20 at 21:59;  Status DC


Lidocaine HCl (Buffered Lidocaine 1%) 3 ml STK-MED ONCE .ROUTE ;  Start 3/25/20 

at 10:00;  Stop 3/27/20 at 13:57;  Status DC


Midazolam HCl 100 mg/Sodium Chloride 100 ml @ 7 mls/hr CONT  PRN IV SEE PROTOCOL

Last administered on 4/8/20at 15:35;  Start 3/28/20 at 16:00;  Stop 6/3/20 at 

14:38;  Status DC


Sodium Chloride 90 meq/Potassium Chloride 15 meq/ Potassium Phosphate 18 mmol/ 

Magnesium Sulfate 8 meq/Calcium Gluconate 15 meq/ Multivitamins 10 ml/Chromium/ 

Copper/Manganese/ Seleni/Zn 0.5 ml/ Insulin Human Regular 15 unit/ Total Par

enteral Nutrition/Amino Acids/Dextrose/ Fat Emulsion Intravenous 1,400 ml @  

58.333 mls/ hr TPN  CONT IV  Last administered on 3/28/20at 20:34;  Start 

3/28/20 at 22:00;  Stop 3/29/20 at 21:59;  Status DC


Info ([facility] Electrolyte Protocol) 1 ea CONT PRN  PRN MC PER PROTOCOL;  Start 

3/29/20 at 13:15


Sodium Chloride 90 meq/Potassium Chloride 15 meq/ Potassium Phosphate 18 mmol/ 

Magnesium Sulfate 8 meq/Calcium Gluconate 15 meq/ Multivitamins 10 ml/Chromium/ 

Copper/Manganese/ Seleni/Zn 0.5 ml/ Insulin Human Regular 15 unit/ Total 

Parenteral Nutrition/Amino Acids/Dextrose/ Fat Emulsion Intravenous 1,400 ml @  

58.333 mls/ hr TPN  CONT IV  Last administered on 3/29/20at 22:05;  Start 

3/29/20 at 22:00;  Stop 3/30/20 at 21:59;  Status DC


Potassium Chloride 15 meq/ Bicarbonate Dialysis Soln w/ out KCl 5,007.5 ml  @ 

1,000 mls/ hr Q5H1M IV  Last administered on 4/1/20at 18:14;  Start 3/29/20 at 

20:00;  Stop 4/2/20 at 13:08;  Status DC


Potassium Chloride 15 meq/ Bicarbonate Dialysis Soln w/ out KCl 5,007.5 ml  @ 

1,000 mls/ hr Q5H1M IV  Last administered on 4/1/20at 18:14;  Start 3/29/20 at 

20:00;  Stop 4/2/20 at 13:08;  Status DC


Potassium Chloride 15 meq/ Bicarbonate Dialysis Soln w/ out KCl 5,007.5 ml  @ 

1,000 mls/ hr Q5H1M IV  Last administered on 4/1/20at 18:14;  Start 3/29/20 at 

20:00;  Stop 4/2/20 at 13:08;  Status DC


Iohexol (Omnipaque 240 Mg/ml) 30 ml 1X  ONCE PO  Last administered on 3/30/20at 

11:30;  Start 3/30/20 at 11:30;  Stop 3/30/20 at 11:33;  Status DC


Info (CONTRAST GIVEN -- Rx MONITORING) 1 each PRN DAILY  PRN MC SEE COMMENTS;  

Start 3/30/20 at 11:45;  Stop 4/1/20 at 11:44;  Status DC


Sodium Chloride 90 meq/Potassium Chloride 15 meq/ Potassium Phosphate 18 mmol/ 

Magnesium Sulfate 8 meq/Calcium Gluconate 15 meq/ Multivitamins 10 ml/Chromium/ 

Copper/Manganese/ Seleni/Zn 0.5 ml/ Insulin Human Regular 15 unit/ Total 

Parenteral Nutrition/Amino Acids/Dextrose/ Fat Emulsion Intravenous 1,400 ml @  

58.333 mls/ hr TPN  CONT IV  Last administered on 3/30/20at 21:47;  Start 

3/30/20 at 22:00;  Stop 3/31/20 at 21:59;  Status DC


Sodium Chloride 90 meq/Potassium Chloride 15 meq/ Potassium Phosphate 18 mmol/ 

Magnesium Sulfate 8 meq/Calcium Gluconate 15 meq/ Multivitamins 10 ml/Chromium/ 

Copper/Manganese/ Seleni/Zn 0.5 ml/ Insulin Human Regular 20 unit/ Total 

Parenteral Nutrition/Amino Acids/Dextrose/ Fat Emulsion Intravenous 1,400 ml @  

58.333 mls/ hr TPN  CONT IV  Last administered on 3/31/20at 21:36;  Start 

3/31/20 at 22:00;  Stop 4/1/20 at 21:59;  Status DC


Alteplase, Recombinant (Cathflo For Central Catheter Clearance) 1 mg 1X  ONCE 

INT CAT  Last administered on 3/31/20at 20:03;  Start 3/31/20 at 19:30;  Stop 

3/31/20 at 19:46;  Status DC


Alteplase, Recombinant (Cathflo For Central Catheter Clearance) 1 mg 1X  ONCE 

INT CAT  Last administered on 3/31/20at 22:05;  Start 3/31/20 at 22:00;  Stop 

3/31/20 at 22:01;  Status DC


Sodium Chloride 90 meq/Potassium Chloride 15 meq/ Potassium Phosphate 18 mmol/ 

Magnesium Sulfate 8 meq/Calcium Gluconate 15 meq/ Multivitamins 10 ml/Chromium/ 

Copper/Manganese/ Seleni/Zn 0.5 ml/ Insulin Human Regular 20 unit/ Total 

Parenteral Nutrition/Amino Acids/Dextrose/ Fat Emulsion Intravenous 1,400 ml @  

58.333 mls/ hr TPN  CONT IV  Last administered on 4/1/20at 21:30;  Start 4/1/20 

at 22:00;  Stop 4/2/20 at 21:59;  Status DC


Dexmedetomidine HCl 400 mcg/ Sodium Chloride 100 ml @ 0 mls/hr CONT  PRN IV 

ANXIETY / AGITATION Last administered on 5/30/20at 12:57;  Start 4/2/20 at 

08:15;  Stop 5/30/20 at 18:31;  Status DC


Sodium Chloride 500 ml @  500 mls/hr 1X PRN  PRN IV ELEVATED BP, SEE COMMENTS;  

Start 4/2/20 at 08:15


Atropine Sulfate (ATROPINE 0.5mg SYRINGE) 0.5 mg PRN Q5MIN  PRN IV SEE COMMENTS;

 Start 4/2/20 at 08:15


Furosemide (Lasix) 20 mg 1X  ONCE IVP  Last administered on 4/2/20at 08:19;  

Start 4/2/20 at 08:15;  Stop 4/2/20 at 08:16;  Status DC


Lidocaine HCl (Buffered Lidocaine 1%) 3 ml STK-MED ONCE .ROUTE ;  Start 4/2/20 

at 08:39;  Stop 4/2/20 at 08:39;  Status DC


Lidocaine HCl (Buffered Lidocaine 1%) 6 ml 1X  ONCE INJ  Last administered on 

4/2/20at 09:05;  Start 4/2/20 at 09:00;  Stop 4/2/20 at 09:06;  Status DC


Sodium Chloride 90 meq/Potassium Chloride 15 meq/ Potassium Phosphate 18 mmol/ 

Magnesium Sulfate 8 meq/Calcium Gluconate 15 meq/ Multivitamins 10 ml/Chromium/ 

Copper/Manganese/ Seleni/Zn 0.5 ml/ Insulin Human Regular 20 unit/ Total 

Parenteral Nutrition/Amino Acids/Dextrose/ Fat Emulsion Intravenous 1,400 ml @  

58.333 mls/ hr TPN  CONT IV  Last administered on 4/2/20at 22:45;  Start 4/2/20 

at 22:00;  Stop 4/3/20 at 21:59;  Status DC


Sodium Chloride 1,000 ml @  1,000 mls/hr Q1H PRN IV hypotension;  Start 4/3/20 

at 07:30;  Stop 4/3/20 at 13:29;  Status DC


Albumin Human 200 ml @  200 mls/hr 1X PRN  PRN IV Hypotension Last administered 

on 4/3/20at 09:36;  Start 4/3/20 at 07:30;  Stop 4/3/20 at 13:29;  Status DC


Sodium Chloride (Normal Saline Flush) 10 ml 1X PRN  PRN IV AP catheter pack;  

Start 4/3/20 at 07:30;  Stop 4/3/20 at 21:29;  Status DC


Sodium Chloride (Normal Saline Flush) 10 ml 1X PRN  PRN IV  catheter pack;  

Start 4/3/20 at 07:30;  Stop 4/4/20 at 07:29;  Status DC


Sodium Chloride 1,000 ml @  400 mls/hr Q2H30M PRN IV PATENCY;  Start 4/3/20 at 

07:30;  Stop 4/3/20 at 19:29;  Status DC


Info (PHARMACY MONITORING -- do not chart) 1 each PRN DAILY  PRN MC SEE 

COMMENTS;  Start 4/3/20 at 07:30;  Stop 4/3/20 at 13:02;  Status DC


Info (PHARMACY MONITORING -- do not chart) 1 each PRN DAILY  PRN MC SEE 

COMMENTS;  Start 4/3/20 at 07:30;  Stop 4/5/20 at 12:45;  Status DC


Sodium Chloride 90 meq/Potassium Chloride 15 meq/ Potassium Phosphate 10 mmol/ 

Magnesium Sulfate 8 meq/Calcium Gluconate 15 meq/ Multivitamins 10 ml/Chromium/ 

Copper/Manganese/ Seleni/Zn 0.5 ml/ Insulin Human Regular 25 unit/ Total 

Parenteral Nutrition/Amino Acids/Dextrose/ Fat Emulsion Intravenous 1,400 ml @  

58.333 mls/ hr TPN  CONT IV  Last administered on 4/3/20at 22:19;  Start 4/3/20 

at 22:00;  Stop 4/4/20 at 21:59;  Status DC


Heparin Sodium (Porcine) (Heparin Sodium) 5,000 unit Q12HR SQ  Last administered

on 4/26/20at 08:59;  Start 4/3/20 at 21:00;  Stop 4/26/20 at 10:05;  Status DC


Ondansetron HCl (Zofran) 4 mg PRN Q6HRS  PRN IV NAUSEA/VOMITING;  Start 4/6/20 

at 07:00;  Stop 4/7/20 at 06:59;  Status DC


Fentanyl Citrate (Fentanyl 2ml Vial) 25 mcg PRN Q5MIN  PRN IV MILD PAIN 1-3;  

Start 4/6/20 at 07:00;  Stop 4/7/20 at 06:59;  Status DC


Fentanyl Citrate (Fentanyl 2ml Vial) 50 mcg PRN Q5MIN  PRN IV MODERATE TO SEVERE

PAIN;  Start 4/6/20 at 07:00;  Stop 4/7/20 at 06:59;  Status DC


Ringer's Solution 1,000 ml @  30 mls/hr Q24H IV ;  Start 4/6/20 at 07:00;  Stop 

4/6/20 at 18:59;  Status DC


Lidocaine HCl (Xylocaine-Mpf 1% 2ml Vial) 2 ml PRN 1X  PRN ID PRIOR TO IV START;

 Start 4/6/20 at 07:00;  Stop 4/7/20 at 06:59;  Status DC


Prochlorperazine Edisylate (Compazine) 5 mg PACU PRN  PRN IV NAUSEA, MRX1;  

Start 4/6/20 at 07:00;  Stop 4/7/20 at 06:59;  Status DC


Sodium Chloride 1,000 ml @  1,000 mls/hr Q1H PRN IV hypotension;  Start 4/4/20 

at 09:10;  Stop 4/4/20 at 15:09;  Status DC


Albumin Human 200 ml @  200 mls/hr 1X PRN  PRN IV Hypotension Last administered 

on 4/4/20at 10:10;  Start 4/4/20 at 09:15;  Stop 4/4/20 at 15:14;  Status DC


Sodium Chloride 1,000 ml @  400 mls/hr Q2H30M PRN IV PATENCY;  Start 4/4/20 at 

09:10;  Stop 4/4/20 at 21:09;  Status DC


Info (PHARMACY MONITORING -- do not chart) 1 each PRN DAILY  PRN MC SEE 

COMMENTS;  Start 4/4/20 at 09:15;  Stop 4/5/20 at 12:45;  Status DC


Info (PHARMACY MONITORING -- do not chart) 1 each PRN DAILY  PRN MC SEE 

COMMENTS;  Start 4/4/20 at 09:15;  Stop 4/5/20 at 12:45;  Status DC


Sodium Chloride 90 meq/Potassium Chloride 15 meq/ Potassium Phosphate 10 mmol/ 

Magnesium Sulfate 8 meq/Calcium Gluconate 15 meq/ Multivitamins 10 ml/Chromium/ 

Copper/Manganese/ Seleni/Zn 0.5 ml/ Insulin Human Regular 25 unit/ Total 

Parenteral Nutrition/Amino Acids/Dextrose/ Fat Emulsion Intravenous 1,400 ml @  

58.333 mls/ hr TPN  CONT IV  Last administered on 4/4/20at 22:10;  Start 4/4/20 

at 22:00;  Stop 4/5/20 at 21:59;  Status DC


Magnesium Sulfate 50 ml @ 25 mls/hr PRN DAILY  PRN IV for Mag < 1.7 on am labs 

Last administered on 6/18/20at 10:57;  Start 4/5/20 at 09:15


Sodium Chloride 90 meq/Potassium Chloride 15 meq/ Potassium Phosphate 10 mmol/ 

Magnesium Sulfate 8 meq/Calcium Gluconate 15 meq/ Multivitamins 10 ml/Chromium/ 

Copper/Manganese/ Seleni/Zn 0.5 ml/ Insulin Human Regular 25 unit/ Total 

Parenteral Nutrition/Amino Acids/Dextrose/ Fat Emulsion Intravenous 1,400 ml @  

58.333 mls/ hr TPN  CONT IV  Last administered on 4/5/20at 21:20;  Start 4/5/20 

at 22:00;  Stop 4/6/20 at 21:59;  Status DC


Sodium Chloride 1,000 ml @  1,000 mls/hr Q1H PRN IV hypotension;  Start 4/5/20 

at 12:23;  Stop 4/5/20 at 18:22;  Status DC


Albumin Human 200 ml @  200 mls/hr 1X  ONCE IV  Last administered on 4/5/20at 

13:34;  Start 4/5/20 at 12:30;  Stop 4/5/20 at 13:29;  Status DC


Diphenhydramine HCl (Benadryl) 25 mg 1X PRN  PRN IV ITCHING;  Start 4/5/20 at 

12:30;  Stop 4/6/20 at 12:29;  Status DC


Diphenhydramine HCl (Benadryl) 25 mg 1X PRN  PRN IV ITCHING;  Start 4/5/20 at 

12:30;  Stop 4/6/20 at 12:29;  Status DC


Info (PHARMACY MONITORING -- do not chart) 1 each PRN DAILY  PRN MC SEE 

COMMENTS;  Start 4/5/20 at 12:30;  Status Cancel


Bupivacaine HCl/ Epinephrine Bitart (Sensorcain-Epi 0.5%-1:381913 Mpf) 30 ml 

STK-MED ONCE .ROUTE  Last administered on 4/6/20at 11:44;  Start 4/6/20 at 

11:00;  Stop 4/6/20 at 11:01;  Status DC


Cellulose (Surgicel Fibrillar 1x2) 1 each STK-MED ONCE .ROUTE ;  Start 4/6/20 at

11:00;  Stop 4/6/20 at 11:01;  Status DC


Sodium Chloride 90 meq/Potassium Chloride 15 meq/ Potassium Phosphate 10 mmol/ 

Magnesium Sulfate 12 meq/Calcium Gluconate 15 meq/ Multivitamins 10 ml/Chromium/

Copper/Manganese/ Seleni/Zn 0.5 ml/ Insulin Human Regular 25 unit/ Total 

Parenteral Nutrition/Amino Acids/Dextrose/ Fat Emulsion Intravenous 1,400 ml @  

58.333 mls/ hr TPN  CONT IV  Last administered on 4/6/20at 22:24;  Start 4/6/20 

at 22:00;  Stop 4/7/20 at 21:59;  Status DC


Propofol 20 ml @ As Directed STK-MED ONCE IV ;  Start 4/6/20 at 11:07;  Stop 

4/6/20 at 11:07;  Status DC


Cellulose (Surgicel Hemostat 4x8) 1 each STK-MED ONCE .ROUTE  Last administered 

on 4/6/20at 11:44;  Start 4/6/20 at 11:55;  Stop 4/6/20 at 11:56;  Status DC


Sevoflurane (Ultane) 60 ml STK-MED ONCE IH ;  Start 4/6/20 at 12:46;  Stop 

4/6/20 at 12:46;  Status DC


Sodium Chloride 1,000 ml @  1,000 mls/hr Q1H PRN IV hypotension;  Start 4/6/20 

at 13:51;  Stop 4/6/20 at 19:50;  Status DC


Albumin Human 200 ml @  200 mls/hr 1X PRN  PRN IV Hypotension Last administered 

on 4/6/20at 14:51;  Start 4/6/20 at 14:00;  Stop 4/6/20 at 19:59;  Status DC


Diphenhydramine HCl (Benadryl) 25 mg 1X PRN  PRN IV ITCHING;  Start 4/6/20 at 

14:00;  Stop 4/7/20 at 13:59;  Status DC


Diphenhydramine HCl (Benadryl) 25 mg 1X PRN  PRN IV ITCHING;  Start 4/6/20 at 

14:00;  Stop 4/7/20 at 13:59;  Status DC


Sodium Chloride 1,000 ml @  400 mls/hr Q2H30M PRN IV PATENCY;  Start 4/6/20 at 

13:51;  Stop 4/7/20 at 01:50;  Status DC


Info (PHARMACY MONITORING -- do not chart) 1 each PRN DAILY  PRN MC SEE 

COMMENTS;  Start 4/6/20 at 14:00;  Stop 4/9/20 at 08:16;  Status DC


Heparin Sodium (Porcine) (Hep Lock Adult) 500 unit STK-MED ONCE IVP ;  Start 

4/7/20 at 09:29;  Stop 4/7/20 at 09:30;  Status DC


Sodium Chloride 1,000 ml @  1,000 mls/hr Q1H PRN IV hypotension;  Start 4/7/20 

at 10:43;  Stop 4/7/20 at 16:42;  Status DC


Sodium Chloride 1,000 ml @  400 mls/hr Q2H30M PRN IV PATENCY;  Start 4/7/20 at 

10:43;  Stop 4/7/20 at 22:42;  Status DC


Info (PHARMACY MONITORING -- do not chart) 1 each PRN DAILY  PRN MC SEE 

COMMENTS;  Start 4/7/20 at 10:45;  Status UNV


Info (PHARMACY MONITORING -- do not chart) 1 each PRN DAILY  PRN MC SEE MIKEY

TS;  Start 4/7/20 at 10:45;  Status UNV


Sodium Chloride 90 meq/Potassium Chloride 15 meq/ Magnesium Sulfate 12 

meq/Calcium Gluconate 15 meq/ Multivitamins 10 ml/Chromium/ Copper/Manganese/ 

Seleni/Zn 0.5 ml/ Insulin Human Regular 25 unit/ Total Parenteral N

utrition/Amino Acids/Dextrose/ Fat Emulsion Intravenous 1,400 ml @  58.333 mls/ 

hr TPN  CONT IV  Last administered on 4/7/20at 22:13;  Start 4/7/20 at 22:00;  

Stop 4/8/20 at 21:59;  Status DC


Sodium Chloride 1,000 ml @  1,000 mls/hr Q1H PRN IV hypotension;  Start 4/8/20 

at 07:50;  Stop 4/8/20 at 13:49;  Status DC


Albumin Human 200 ml @  200 mls/hr 1X  ONCE IV ;  Start 4/8/20 at 08:00;  Stop 

4/8/20 at 08:53;  Status DC


Diphenhydramine HCl (Benadryl) 25 mg 1X PRN  PRN IV ITCHING;  Start 4/8/20 at 

08:00;  Stop 4/9/20 at 07:59;  Status DC


Diphenhydramine HCl (Benadryl) 25 mg 1X PRN  PRN IV ITCHING;  Start 4/8/20 at 

08:00;  Stop 4/9/20 at 07:59;  Status DC


Info (PHARMACY MONITORING -- do not chart) 1 each PRN DAILY  PRN MC SEE 

COMMENTS;  Start 4/8/20 at 08:00;  Stop 4/9/20 at 08:16;  Status DC


Albumin Human 50 ml @ 50 mls/hr 1X  ONCE IV ;  Start 4/8/20 at 08:53;  Stop 

4/8/20 at 08:56;  Status DC


Albumin Human 200 ml @  50 mls/hr PRN 1X  PRN IV HYPOTENSION Last administered 

on 4/14/20at 11:54;  Start 4/8/20 at 09:00;  Stop 5/21/20 at 11:14;  Status DC


Meropenem 500 mg/ Sodium Chloride 50 ml @  100 mls/hr Q12H IV  Last administered

on 4/28/20at 10:45;  Start 4/8/20 at 10:00;  Stop 4/28/20 at 12:37;  Status DC


Sodium Chloride 90 meq/Magnesium Sulfate 12 meq/ Calcium Gluconate 15 meq/ 

Multivitamins 10 ml/Chromium/ Copper/Manganese/ Seleni/Zn 0.5 ml/ Insulin Human 

Regular 25 unit/ Total Parenteral Nutrition/Amino Acids/Dextrose/ Fat Emulsion 

Intravenous 1,400 ml @  58.333 mls/ hr TPN  CONT IV  Last administered on 4/8/20

at 21:41;  Start 4/8/20 at 22:00;  Stop 4/9/20 at 21:59;  Status DC


Sodium Chloride 1,000 ml @  1,000 mls/hr Q1H PRN IV hypotension;  Start 4/9/20 

at 07:58;  Stop 4/9/20 at 13:57;  Status DC


Albumin Human 200 ml @  200 mls/hr 1X PRN  PRN IV Hypotension Last administered 

on 4/9/20at 09:30;  Start 4/9/20 at 08:00;  Stop 4/9/20 at 13:59;  Status DC


Sodium Chloride 1,000 ml @  400 mls/hr Q2H30M PRN IV PATENCY;  Start 4/9/20 at 

07:58;  Stop 4/9/20 at 19:57;  Status DC


Info (PHARMACY MONITORING -- do not chart) 1 each PRN DAILY  PRN MC SEE 

COMMENTS;  Start 4/9/20 at 08:00;  Status Cancel


Info (PHARMACY MONITORING -- do not chart) 1 each PRN DAILY  PRN MC SEE 

COMMENTS;  Start 4/9/20 at 08:15;  Status UNV


Sodium Chloride 90 meq/Potassium Phosphate 5 mmol/ Magnesium Sulfate 12 

meq/Calcium Gluconate 15 meq/ Multivitamins 10 ml/Chromium/ Copper/Manganese/ 

Seleni/Zn 0.5 ml/ Insulin Human Regular 30 unit/ Total Parenteral 

Nutrition/Amino Acids/Dextrose/ Fat Emulsion Intravenous 1,400 ml @  58.333 mls/

hr TPN  CONT IV  Last administered on 4/9/20at 22:08;  Start 4/9/20 at 22:00;  

Stop 4/10/20 at 21:59;  Status DC


Linezolid/Dextrose 300 ml @  300 mls/hr Q12HR IV  Last administered on 4/20/20at

20:40;  Start 4/10/20 at 11:00;  Stop 4/21/20 at 08:10;  Status DC


Sodium Chloride 90 meq/Potassium Phosphate 15 mmol/ Magnesium Sulfate 12 

meq/Calcium Gluconate 15 meq/ Multivitamins 10 ml/Chromium/ Copper/Manganese/ 

Seleni/Zn 0.5 ml/ Insulin Human Regular 30 unit/ Total Parenteral 

Nutrition/Amino Acids/Dextrose/ Fat Emulsion Intravenous 1,400 ml @  58.333 mls/

hr TPN  CONT IV  Last administered on 4/10/20at 21:49;  Start 4/10/20 at 22:00; 

Stop 4/11/20 at 21:59;  Status DC


Sodium Chloride 90 meq/Potassium Phosphate 15 mmol/ Magnesium Sulfate 12 

meq/Calcium Gluconate 15 meq/ Multivitamins 10 ml/Chromium/ Copper/Manganese/ 

Seleni/Zn 0.5 ml/ Insulin Human Regular 40 unit/ Total Parenteral 

Nutrition/Amino Acids/Dextrose/ Fat Emulsion Intravenous 1,400 ml @  58.333 mls/

hr TPN  CONT IV  Last administered on 4/11/20at 21:21;  Start 4/11/20 at 22:00; 

Stop 4/12/20 at 21:59;  Status DC


Sodium Chloride 1,000 ml @  1,000 mls/hr Q1H PRN IV hypotension;  Start 4/11/20 

at 13:26;  Stop 4/11/20 at 19:25;  Status DC


Albumin Human 200 ml @  200 mls/hr 1X PRN  PRN IV Hypotension Last administered 

on 4/11/20at 15:00;  Start 4/11/20 at 13:30;  Stop 4/11/20 at 19:29;  Status DC


Sodium Chloride (Normal Saline Flush) 10 ml 1X PRN  PRN IV AP catheter pack;  

Start 4/11/20 at 13:30;  Stop 4/12/20 at 13:29;  Status DC


Sodium Chloride (Normal Saline Flush) 10 ml 1X PRN  PRN IV  catheter pack;  

Start 4/11/20 at 13:30;  Stop 4/12/20 at 13:29;  Status DC


Sodium Chloride 1,000 ml @  400 mls/hr Q2H30M PRN IV PATENCY;  Start 4/11/20 at 

13:26;  Stop 4/12/20 at 01:25;  Status DC


Info (PHARMACY MONITORING -- do not chart) 1 each PRN DAILY  PRN MC SEE 

COMMENTS;  Start 4/11/20 at 13:30;  Stop 4/11/20 at 13:33;  Status DC


Info (PHARMACY MONITORING -- do not chart) 1 each PRN DAILY  PRN MC SEE 

COMMENTS;  Start 4/11/20 at 13:30;  Stop 4/11/20 at 13:34;  Status DC


Sodium Chloride 90 meq/Potassium Phosphate 19 mmol/ Magnesium Sulfate 12 

meq/Calcium Gluconate 15 meq/ Multivitamins 10 ml/Chromium/ Copper/Manganese/ 

Seleni/Zn 0.5 ml/ Insulin Human Regular 40 unit/ Total Parenteral 

Nutrition/Amino Acids/Dextrose/ Fat Emulsion Intravenous 1,400 ml @  58.333 mls/

hr TPN  CONT IV  Last administered on 4/12/20at 21:54;  Start 4/12/20 at 22:00; 

Stop 4/13/20 at 21:59;  Status DC


Sodium Chloride 1,000 ml @  1,000 mls/hr Q1H PRN IV hypotension;  Start 4/13/20 

at 09:35;  Stop 4/13/20 at 15:34;  Status DC


Albumin Human 200 ml @  200 mls/hr 1X PRN  PRN IV Hypotension;  Start 4/13/20 at

09:45;  Stop 4/13/20 at 15:44;  Status DC


Diphenhydramine HCl (Benadryl) 25 mg 1X PRN  PRN IV ITCHING;  Start 4/13/20 at 

09:45;  Stop 4/14/20 at 09:44;  Status DC


Diphenhydramine HCl (Benadryl) 25 mg 1X PRN  PRN IV ITCHING;  Start 4/13/20 at 

09:45;  Stop 4/14/20 at 09:44;  Status DC


Sodium Chloride 1,000 ml @  400 mls/hr Q2H30M PRN IV PATENCY;  Start 4/13/20 at 

09:35;  Stop 4/13/20 at 21:34;  Status DC


Info (PHARMACY MONITORING -- do not chart) 1 each PRN DAILY  PRN MC SEE 

COMMENTS;  Start 4/13/20 at 09:45;  Status Cancel


Sodium Chloride 100 meq/Potassium Phosphate 19 mmol/ Magnesium Sulfate 12 

meq/Calcium Gluconate 15 meq/ Multivitamins 10 ml/Chromium/ Copper/Manganese/ 

Seleni/Zn 0.5 ml/ Insulin Human Regular 40 unit/ Potassium Chloride 20 meq/ 

Total Parenteral Nutrition/Amino Acids/Dextrose/ Fat Emulsion Intravenous 1,400 

ml @  58.333 mls/ hr TPN  CONT IV  Last administered on 4/13/20at 22:02;  Start 

4/13/20 at 22:00;  Stop 4/14/20 at 21:59;  Status DC


Furosemide (Lasix) 40 mg 1X  ONCE IVP  Last administered on 4/13/20at 14:39;  

Start 4/13/20 at 14:30;  Stop 4/13/20 at 14:31;  Status DC


Metronidazole 100 ml @  100 mls/hr Q8HRS IV  Last administered on 4/21/20at 

06:04;  Start 4/14/20 at 10:00;  Stop 4/21/20 at 08:10;  Status DC


Sodium Chloride 1,000 ml @  1,000 mls/hr Q1H PRN IV hypotension;  Start 4/14/20 

at 08:00;  Stop 4/14/20 at 13:59;  Status DC


Albumin Human 200 ml @  200 mls/hr 1X PRN  PRN IV Hypotension;  Start 4/14/20 at

08:00;  Stop 4/14/20 at 13:59;  Status DC


Sodium Chloride 1,000 ml @  400 mls/hr Q2H30M PRN IV PATENCY;  Start 4/14/20 at 

08:00;  Stop 4/14/20 at 19:59;  Status DC


Info (PHARMACY MONITORING -- do not chart) 1 each PRN DAILY  PRN MC SEE 

COMMENTS;  Start 4/14/20 at 11:30;  Status UNV


Info (PHARMACY MONITORING -- do not chart) 1 each PRN DAILY  PRN MC SEE 

COMMENTS;  Start 4/14/20 at 11:30;  Stop 4/16/20 at 12:13;  Status DC


Sodium Chloride 100 meq/Potassium Phosphate 19 mmol/ Magnesium Sulfate 12 meq

/Calcium Gluconate 15 meq/ Multivitamins 10 ml/Chromium/ Copper/Manganese/ 

Seleni/Zn 0.5 ml/ Insulin Human Regular 40 unit/ Potassium Chloride 20 meq/ 

Total Parenteral Nutrition/Amino Acids/Dextrose/ Fat Emulsion Intravenous 1,400 

ml @  58.333 mls/ hr TPN  CONT IV  Last administered on 4/14/20at 21:52;  Start 

4/14/20 at 22:00;  Stop 4/15/20 at 21:59;  Status DC


Sodium Chloride (Normal Saline Flush) 10 ml QSHIFT  PRN IV AFTER MEDS AND BLOOD 

DRAWS;  Start 4/14/20 at 15:00;  Stop 5/12/20 at 11:27;  Status DC


Sodium Chloride (Normal Saline Flush) 10 ml PRN Q5MIN  PRN IV AFTER MEDS AND 

BLOOD DRAWS;  Start 4/14/20 at 15:00


Sodium Chloride (Normal Saline Flush) 20 ml PRN Q5MIN  PRN IV AFTER MEDS AND 

BLOOD DRAWS;  Start 4/14/20 at 15:00


Sodium Chloride 100 meq/Potassium Phosphate 19 mmol/ Magnesium Sulfate 12 

meq/Calcium Gluconate 15 meq/ Multivitamins 10 ml/Chromium/ Copper/Manganese/ 

Seleni/Zn 0.5 ml/ Insulin Human Regular 40 unit/ Potassium Chloride 20 meq/ 

Total Parenteral Nutrition/Amino Acids/Dextrose/ Fat Emulsion Intravenous 1,400 

ml @  58.333 mls/ hr TPN  CONT IV  Last administered on 4/15/20at 21:20;  Start 

4/15/20 at 22:00;  Stop 4/16/20 at 21:59;  Status DC


Lidocaine HCl (Buffered Lidocaine 1%) 3 ml STK-MED ONCE .ROUTE ;  Start 4/15/20 

at 13:16;  Stop 4/15/20 at 13:16;  Status DC


Lidocaine HCl (Buffered Lidocaine 1%) 6 ml 1X  ONCE INJ  Last administered on 

4/15/20at 13:45;  Start 4/15/20 at 13:30;  Stop 4/15/20 at 13:31;  Status DC


Albumin Human 100 ml @  100 mls/hr 1X  ONCE IV  Last administered on 4/15/20at 

15:41;  Start 4/15/20 at 15:00;  Stop 4/15/20 at 15:59;  Status DC


Albumin Human 50 ml @ 50 mls/hr 1X  ONCE IV  Last administered on 4/15/20at 

15:00;  Start 4/15/20 at 15:00;  Stop 4/15/20 at 15:59;  Status DC


Info (PHARMACY MONITORING -- do not chart) 1 each PRN DAILY  PRN MC SEE 

COMMENTS;  Start 4/16/20 at 11:30;  Status Cancel


Info (PHARMACY MONITORING -- do not chart) 1 each PRN DAILY  PRN MC SEE 

COMMENTS;  Start 4/16/20 at 11:30;  Status UNV


Sodium Chloride 100 meq/Potassium Phosphate 10 mmol/ Magnesium Sulfate 12 

meq/Calcium Gluconate 15 meq/ Multivitamins 10 ml/Chromium/ Copper/Manganese/ 

Seleni/Zn 0.5 ml/ Insulin Human Regular 35 unit/ Potassium Chloride 20 meq/ 

Total Parenteral Nutrition/Amino Acids/Dextrose/ Fat Emulsion Intravenous 1,400 

ml @  58.333 mls/ hr TPN  CONT IV  Last administered on 4/16/20at 22:10;  Start 

4/16/20 at 22:00;  Stop 4/17/20 at 21:59;  Status DC


Sodium Chloride 100 meq/Potassium Phosphate 5 mmol/ Magnesium Sulfate 12 

meq/Calcium Gluconate 15 meq/ Multivitamins 10 ml/Chromium/ Copper/Manganese/ 

Seleni/Zn 0.5 ml/ Insulin Human Regular 35 unit/ Potassium Chloride 20 meq/ 

Total Parenteral Nutrition/Amino Acids/Dextrose/ Fat Emulsion Intravenous 1,400 

ml @  58.333 mls/ hr TPN  CONT IV  Last administered on 4/17/20at 22:59;  Start 

4/17/20 at 22:00;  Stop 4/18/20 at 21:59;  Status DC


Sodium Chloride 1,000 ml @  1,000 mls/hr Q1H PRN IV hypotension;  Start 4/18/20 

at 08:27;  Stop 4/18/20 at 14:26;  Status DC


Albumin Human 200 ml @  200 mls/hr 1X PRN  PRN IV Hypotension Last administered 

on 4/18/20at 09:18;  Start 4/18/20 at 08:30;  Stop 4/18/20 at 14:29;  Status DC


Sodium Chloride 1,000 ml @  400 mls/hr Q2H30M PRN IV PATENCY;  Start 4/18/20 at 

08:27;  Stop 4/18/20 at 20:26;  Status DC


Info (PHARMACY MONITORING -- do not chart) 1 each PRN DAILY  PRN MC SEE 

COMMENTS;  Start 4/18/20 at 08:30;  Status Cancel


Info (PHARMACY MONITORING -- do not chart) 1 each PRN DAILY  PRN MC SEE 

COMMENTS;  Start 4/18/20 at 08:30;  Stop 4/26/20 at 13:10;  Status DC


Sodium Chloride 100 meq/Potassium Chloride 40 meq/ Magnesium Sulfate 15 

meq/Calcium Gluconate 15 meq/ Multivitamins 10 ml/Chromium/ Copper/Manganese/ 

Seleni/Zn 0.5 ml/ Insulin Human Regular 35 unit/ Total Parenteral 

Nutrition/Amino Acids/Dextrose/ Fat Emulsion Intravenous 1,400 ml @  58.333 mls/

hr TPN  CONT IV  Last administered on 4/18/20at 22:00;  Start 4/18/20 at 22:00; 

Stop 4/19/20 at 21:59;  Status DC


Potassium Chloride/Water 100 ml @  100 mls/hr 1X  ONCE IV  Last administered on 

4/18/20at 17:28;  Start 4/18/20 at 14:45;  Stop 4/18/20 at 15:44;  Status DC


Sodium Chloride 100 meq/Potassium Chloride 40 meq/ Magnesium Sulfate 15 

meq/Calcium Gluconate 15 meq/ Multivitamins 10 ml/Chromium/ Copper/Manganese/ 

Seleni/Zn 0.5 ml/ Insulin Human Regular 35 unit/ Total Parenteral 

Nutrition/Amino Acids/Dextrose/ Fat Emulsion Intravenous 1,400 ml @  58.333 mls/

hr TPN  CONT IV  Last administered on 4/19/20at 22:46;  Start 4/19/20 at 22:00; 

Stop 4/20/20 at 21:59;  Status DC


Sodium Chloride 100 meq/Potassium Chloride 40 meq/ Magnesium Sulfate 20 

meq/Calcium Gluconate 15 meq/ Multivitamins 10 ml/Chromium/ Copper/Manganese/ 

Seleni/Zn 0.5 ml/ Insulin Human Regular 35 unit/ Total Parenteral 

Nutrition/Amino Acids/Dextrose/ Fat Emulsion Intravenous 1,400 ml @  58.333 mls/

hr TPN  CONT IV  Last administered on 4/20/20at 22:31;  Start 4/20/20 at 22:00; 

Stop 4/21/20 at 21:59;  Status DC


Fentanyl Citrate (Fentanyl 2ml Vial) 50 mcg PRN Q2HR  PRN IVP PAIN Last 

administered on 4/27/20at 13:32;  Start 4/20/20 at 21:00;  Stop 4/28/20 at 

12:53;  Status DC


Fentanyl Citrate (Fentanyl 2ml Vial) 25 mcg PRN Q2HR  PRN IVP PAIN;  Start 

4/20/20 at 21:00;  Stop 4/28/20 at 12:54;  Status DC


Enoxaparin Sodium (Lovenox 100mg Syringe) 100 mg Q12HR SQ ;  Start 4/21/20 at 

21:00;  Status UNV


Amino Acids/ Glycerin/ Electrolytes 1,000 ml @  75 mls/hr [id number] IV ;  Start 

4/20/20 at 21:15;  Status UNV


Sodium Chloride 1,000 ml @  1,000 mls/hr Q1H PRN IV hypotension;  Start 4/21/20 

at 07:56;  Stop 4/21/20 at 13:55;  Status DC


Albumin Human 200 ml @  200 mls/hr 1X PRN  PRN IV Hypotension Last administered 

on 4/21/20at 08:40;  Start 4/21/20 at 08:00;  Stop 4/21/20 at 13:59;  Status DC


Sodium Chloride 1,000 ml @  400 mls/hr Q2H30M PRN IV PATENCY;  Start 4/21/20 at 

07:56;  Stop 4/21/20 at 19:55;  Status DC


Info (PHARMACY MONITORING -- do not chart) 1 each PRN DAILY  PRN MC SEE 

COMMENTS;  Start 4/21/20 at 08:00;  Status UNV


Info (PHARMACY MONITORING -- do not chart) 1 each PRN DAILY  PRN MC SEE 

COMMENTS;  Start 4/21/20 at 08:00;  Status UNV


Daptomycin 430 mg/ Sodium Chloride 50 ml @  100 mls/hr Q24H IV  Last 

administered on 4/21/20at 12:35;  Start 4/21/20 at 09:00;  Stop 4/21/20 at 

12:49;  Status DC


Sodium Chloride 100 meq/Potassium Chloride 40 meq/ Magnesium Sulfate 20 

meq/Calcium Gluconate 15 meq/ Multivitamins 10 ml/Chromium/ Copper/Manganese/ 

Seleni/Zn 0.5 ml/ Insulin Human Regular 35 unit/ Total Parenteral 

Nutrition/Amino Acids/Dextrose/ Fat Emulsion Intravenous 1,400 ml @  58.333 mls/

hr TPN  CONT IV  Last administered on 4/21/20at 21:26;  Start 4/21/20 at 22:00; 

Stop 4/22/20 at 21:59;  Status DC


Daptomycin 430 mg/ Sodium Chloride 50 ml @  100 mls/hr Q48H IV ;  Start 4/23/20 

at 09:00;  Stop 4/22/20 at 11:55;  Status DC


Sodium Chloride 100 meq/Potassium Chloride 40 meq/ Magnesium Sulfate 20 

meq/Calcium Gluconate 15 meq/ Multivitamins 10 ml/Chromium/ Copper/Manganese/ 

Seleni/Zn 0.5 ml/ Insulin Human Regular 35 unit/ Total Parenteral 

Nutrition/Amino Acids/Dextrose/ Fat Emulsion Intravenous 1,400 ml @  58.333 mls/

hr TPN  CONT IV  Last administered on 4/22/20at 22:27;  Start 4/22/20 at 22:00; 

Stop 4/23/20 at 21:59;  Status DC


Daptomycin 430 mg/ Sodium Chloride 50 ml @  100 mls/hr Q24H IV  Last 

administered on 4/24/20at 15:07;  Start 4/22/20 at 13:00;  Stop 4/25/20 at 

13:15;  Status DC


Sodium Chloride 100 meq/Potassium Chloride 40 meq/ Magnesium Sulfate 20 m

eq/Calcium Gluconate 10 meq/ Multivitamins 10 ml/Chromium/ Copper/Manganese/ 

Seleni/Zn 0.5 ml/ Insulin Human Regular 35 unit/ Total Parenteral 

Nutrition/Amino Acids/Dextrose/ Fat Emulsion Intravenous 1,400 ml @  58.333 mls/

hr TPN  CONT IV  Last administered on 4/24/20at 00:06;  Start 4/23/20 at 22:00; 

Stop 4/24/20 at 21:59;  Status DC


Alteplase, Recombinant (Cathflo For Central Catheter Clearance) 1 mg 1X  ONCE 

INT CAT  Last administered on 4/24/20at 11:44;  Start 4/24/20 at 10:45;  Stop 

4/24/20 at 10:46;  Status DC


Ondansetron HCl (Zofran) 4 mg PRN Q6HRS  PRN IV NAUSEA/VOMITING;  Start 4/27/20 

at 07:00;  Stop 4/28/20 at 06:59;  Status DC


Fentanyl Citrate (Fentanyl 2ml Vial) 25 mcg PRN Q5MIN  PRN IV MILD PAIN 1-3;  

Start 4/27/20 at 07:00;  Stop 4/28/20 at 06:59;  Status DC


Fentanyl Citrate (Fentanyl 2ml Vial) 50 mcg PRN Q5MIN  PRN IV MODERATE TO SEVERE

PAIN Last administered on 4/27/20at 10:17;  Start 4/27/20 at 07:00;  Stop 

4/28/20 at 06:59;  Status DC


Ringer's Solution 1,000 ml @  30 mls/hr Q24H IV ;  Start 4/27/20 at 07:00;  Stop

4/27/20 at 18:59;  Status DC


Lidocaine HCl (Xylocaine-Mpf 1% 2ml Vial) 2 ml PRN 1X  PRN ID PRIOR TO IV START;

 Start 4/27/20 at 07:00;  Stop 4/28/20 at 06:59;  Status DC


Prochlorperazine Edisylate (Compazine) 5 mg PACU PRN  PRN IV NAUSEA, MRX1;  

Start 4/27/20 at 07:00;  Stop 4/28/20 at 06:59;  Status DC


Sodium Acetate 50 meq/Potassium Acetate 55 meq/ Magnesium Sulfate 20 meq/Calcium

Gluconate 10 meq/ Multivitamins 10 ml/Chromium/ Copper/Manganese/ Seleni/Zn 0.5 

ml/ Insulin Human Regular 35 unit/ Total Parenteral Nutrition/Amino 

Acids/Dextrose/ Fat Emulsion Intravenous 1,400 ml @  58.333 mls/ hr TPN  CONT IV

;  Start 4/24/20 at 22:00;  Stop 4/24/20 at 14:15;  Status DC


Sodium Acetate 50 meq/Potassium Acetate 55 meq/ Magnesium Sulfate 20 meq/Calcium

Gluconate 10 meq/ Multivitamins 10 ml/Chromium/ Copper/Manganese/ Seleni/Zn 0.5 

ml/ Insulin Human Regular 35 unit/ Total Parenteral Nutrition/Amino 

Acids/Dextrose/ Fat Emulsion Intravenous 1,800 ml @  75 mls/hr TPN  CONT IV  

Last administered on 4/24/20at 22:38;  Start 4/24/20 at 22:00;  Stop 4/25/20 at 

21:59;  Status DC


Sodium Chloride 1,000 ml @  1,000 mls/hr Q1H PRN IV hypotension;  Start 4/24/20 

at 15:31;  Stop 4/24/20 at 21:30;  Status DC


Diphenhydramine HCl (Benadryl) 25 mg 1X PRN  PRN IV ITCHING;  Start 4/24/20 at 

15:45;  Stop 4/25/20 at 15:44;  Status DC


Diphenhydramine HCl (Benadryl) 25 mg 1X PRN  PRN IV ITCHING;  Start 4/24/20 at 

15:45;  Stop 4/25/20 at 15:44;  Status DC


Sodium Chloride 1,000 ml @  400 mls/hr Q2H30M PRN IV PATENCY;  Start 4/24/20 at 

15:31;  Stop 4/25/20 at 03:30;  Status DC


Info (PHARMACY MONITORING -- do not chart) 1 each PRN DAILY  PRN MC SEE 

COMMENTS;  Start 4/24/20 at 15:45;  Stop 5/26/20 at 14:14;  Status DC


Sodium Acetate 50 meq/Potassium Acetate 55 meq/ Magnesium Sulfate 20 meq/Calcium

Gluconate 10 meq/ Multivitamins 10 ml/Chromium/ Copper/Manganese/ Seleni/Zn 0.5 

ml/ Insulin Human Regular 35 unit/ Total Parenteral Nutrition/Amino 

Acids/Dextrose/ Fat Emulsion Intravenous 1,800 ml @  75 mls/hr TPN  CONT IV  Las

t administered on 4/25/20at 22:03;  Start 4/25/20 at 22:00;  Stop 4/26/20 at 

21:59;  Status DC


Daptomycin 430 mg/ Sodium Chloride 50 ml @  100 mls/hr Q24H IV  Last 

administered on 4/30/20at 13:00;  Start 4/25/20 at 13:00;  Stop 4/30/20 at 

20:58;  Status DC


Heparin Sodium (Porcine) 1000 unit/Sodium Chloride 1,001 ml @  1,001 mls/hr 1X  

ONCE IRR ;  Start 4/27/20 at 06:00;  Stop 4/27/20 at 06:59;  Status DC


Potassium Acetate 55 meq/Magnesium Sulfate 20 meq/ Calcium Gluconate 10 meq/ 

Multivitamins 10 ml/Chromium/ Copper/Manganese/ Seleni/Zn 0.5 ml/ Insulin Human 

Regular 35 unit/ Total Parenteral Nutrition/Amino Acids/Dextrose/ Fat Emulsion 

Intravenous 1,920 ml @  80 mls/hr TPN  CONT IV  Last administered on 4/26/20at 

22:10;  Start 4/26/20 at 22:00;  Stop 4/27/20 at 21:59;  Status DC


Dexamethasone Sodium Phosphate (Decadron) 4 mg STK-MED ONCE .ROUTE ;  Start 

4/27/20 at 10:56;  Stop 4/27/20 at 10:57;  Status DC


Ondansetron HCl (Zofran) 4 mg STK-MED ONCE .ROUTE ;  Start 4/27/20 at 10:56;  

Stop 4/27/20 at 10:57;  Status DC


Rocuronium Bromide (Zemuron) 50 mg STK-MED ONCE .ROUTE ;  Start 4/27/20 at 

10:56;  Stop 4/27/20 at 10:57;  Status DC


Fentanyl Citrate (Fentanyl 2ml Vial) 100 mcg STK-MED ONCE .ROUTE ;  Start 

4/27/20 at 10:56;  Stop 4/27/20 at 10:57;  Status DC


Bupivacaine HCl/ Epinephrine Bitart (Sensorcain-Epi 0.5%-1:496120 Mpf) 30 ml 

STK-MED ONCE .ROUTE  Last administered on 4/27/20at 12:01;  Start 4/27/20 at 

10:58;  Stop 4/27/20 at 10:58;  Status DC


Cellulose (Surgicel Hemostat 2x14) 1 each STK-MED ONCE .ROUTE ;  Start 4/27/20 

at 10:58;  Stop 4/27/20 at 10:59;  Status DC


Iohexol (Omnipaque 300 Mg/ml) 50 ml STK-MED ONCE .ROUTE ;  Start 4/27/20 at 

10:58;  Stop 4/27/20 at 10:59;  Status DC


Cellulose (Surgicel Hemostat 4x8) 1 each STK-MED ONCE .ROUTE ;  Start 4/27/20 at

10:58;  Stop 4/27/20 at 10:59;  Status DC


Bisacodyl (Dulcolax Supp) 10 mg STK-MED ONCE .ROUTE ;  Start 4/27/20 at 10:59;  

Stop 4/27/20 at 10:59;  Status DC


Heparin Sodium (Porcine) 1000 unit/Sodium Chloride 1,001 ml @  1,001 mls/hr 1X  

ONCE IRR ;  Start 4/27/20 at 12:00;  Stop 4/27/20 at 12:59;  Status DC


Propofol 20 ml @ As Directed STK-MED ONCE IV ;  Start 4/27/20 at 11:05;  Stop 

4/27/20 at 11:05;  Status DC


Sevoflurane (Ultane) 90 ml STK-MED ONCE IH ;  Start 4/27/20 at 11:05;  Stop 

4/27/20 at 11:05;  Status DC


Sevoflurane (Ultane) 60 ml STK-MED ONCE IH ;  Start 4/27/20 at 12:26;  Stop 

4/27/20 at 12:27;  Status DC


Propofol 20 ml @ As Directed STK-MED ONCE IV ;  Start 4/27/20 at 12:26;  Stop 

4/27/20 at 12:27;  Status DC


Phenylephrine HCl (PHENYLEPHRINE in 0.9% NACL PF) 1 mg STK-MED ONCE IV ;  Start 

4/27/20 at 12:34;  Stop 4/27/20 at 12:34;  Status DC


Heparin Sodium (Porcine) (Heparin Sodium) 5,000 unit Q12HR SQ  Last administered

on 5/6/20at 20:57;  Start 4/27/20 at 21:00;  Stop 5/7/20 at 09:59;  Status DC


Sodium Chloride (Normal Saline Flush) 3 ml QSHIFT  PRN IV AFTER MEDS AND BLOOD 

DRAWS;  Start 4/27/20 at 13:45;  Status Cancel


Naloxone HCl (Narcan) 0.4 mg PRN Q2MIN  PRN IV SEE INSTRUCTIONS Last 

administered on 6/6/20at 15:15;  Start 4/27/20 at 13:45;  Stop 7/1/20 at 16:00; 

Status DC


Sodium Chloride 1,000 ml @  25 mls/hr Q24H IV  Last administered on 5/26/20at 

13:37;  Start 4/27/20 at 13:37;  Stop 5/29/20 at 13:09;  Status DC


Naloxone HCl (Narcan) 0.4 mg PRN Q2MIN  PRN IV SEE INSTRUCTIONS;  Start 4/27/20 

at 14:30;  Status UNV


Sodium Chloride 1,000 ml @  25 mls/hr Q24H IV ;  Start 4/27/20 at 14:30;  Status

UNV


Hydromorphone HCl 30 ml @ 0 mls/hr CONT PRN  PRN IV PER PROTOCOL Last 

administered on 5/2/20at 16:08;  Start 4/27/20 at 14:30;  Stop 5/4/20 at 08:55; 

Status DC


Potassium Acetate 55 meq/Magnesium Sulfate 20 meq/ Calcium Gluconate 10 meq/ 

Multivitamins 10 ml/Chromium/ Copper/Manganese/ Seleni/Zn 0.5 ml/ Insulin Human 

Regular 35 unit/ Total Parenteral Nutrition/Amino Acids/Dextrose/ Fat Emulsion 

Intravenous 1,920 ml @  80 mls/hr TPN  CONT IV  Last administered on 4/27/20at 

22:01;  Start 4/27/20 at 22:00;  Stop 4/28/20 at 21:59;  Status DC


Bumetanide (Bumex) 2 mg [id number] IV  Last administered on 5/1/20at 13:50;  Start 

4/28/20 at 14:00;  Stop 5/2/20 at 14:10;  Status DC


Meropenem 1 gm/ Sodium Chloride 100 ml @  200 mls/hr Q8HRS IV  Last administered

on 5/22/20at 05:53;  Start 4/28/20 at 14:00;  Stop 5/22/20 at 09:31;  Status DC


Potassium Acetate 55 meq/Magnesium Sulfate 20 meq/ Calcium Gluconate 10 meq/ 

Multivitamins 10 ml/Chromium/ Copper/Manganese/ Seleni/Zn 0.5 ml/ Insulin Human 

Regular 35 unit/ Total Parenteral Nutrition/Amino Acids/Dextrose/ Fat Emulsion 

Intravenous 1,920 ml @  80 mls/hr TPN  CONT IV  Last administered on 4/28/20at 

22:02;  Start 4/28/20 at 22:00;  Stop 4/29/20 at 21:59;  Status DC


Hydromorphone HCl (Dilaudid Standard PCA) 12 mg STK-MED ONCE IV ;  Start 4/27/20

at 14:35;  Stop 4/28/20 at 13:53;  Status DC


Artificial Tears (Artificial Tears) 1 drop PRN Q15MIN  PRN OU DRY EYE Last 

administered on 6/23/20at 21:17;  Start 4/29/20 at 05:30


Hydromorphone HCl (Dilaudid Standard PCA) 12 mg STK-MED ONCE IV ;  Start 4/28/20

at 12:05;  Stop 4/29/20 at 09:15;  Status DC


Potassium Acetate 65 meq/Magnesium Sulfate 20 meq/ Calcium Gluconate 10 meq/ 

Multivitamins 10 ml/Chromium/ Copper/Manganese/ Seleni/Zn 0.5 ml/ Insulin Human 

Regular 30 unit/ Total Parenteral Nutrition/Amino Acids/Dextrose/ Fat Emulsion 

Intravenous 1,920 ml @  80 mls/hr TPN  CONT IV  Last administered on 4/29/20at 

22:22;  Start 4/29/20 at 22:00;  Stop 4/30/20 at 21:59;  Status DC


Cyclobenzaprine HCl (Flexeril) 10 mg PRN Q6HRS  PRN PO MUSCLE SPASMS Last 

administered on 7/10/20at 19:12;  Start 4/30/20 at 10:45


Potassium Acetate 55 meq/Magnesium Sulfate 20 meq/ Calcium Gluconate 10 meq/ 

Multivitamins 10 ml/Chromium/ Copper/Manganese/ Seleni/Zn 0.5 ml/ Insulin Human 

Regular 30 unit/ Total Parenteral Nutrition/Amino Acids/Dextrose/ Fat Emulsion 

Intravenous 1,920 ml @  80 mls/hr TPN  CONT IV  Last administered on 5/1/20at 

01:00;  Start 4/30/20 at 22:00;  Stop 5/1/20 at 21:59;  Status DC


Magnesium Sulfate 50 ml @ 25 mls/hr 1X  ONCE IV  Last administered on 4/30/20at 

17:18;  Start 4/30/20 at 12:45;  Stop 4/30/20 at 14:44;  Status DC


Potassium Chloride/Water 100 ml @  100 mls/hr 1X  ONCE IV  Last administered on 

5/1/20at 11:27;  Start 5/1/20 at 12:00;  Stop 5/1/20 at 12:59;  Status DC


Hydromorphone HCl (Dilaudid Standard PCA) 12 mg STK-MED ONCE IV ;  Start 4/29/20

at 10:50;  Stop 5/1/20 at 11:02;  Status DC


Hydromorphone HCl (Dilaudid Standard PCA) 12 mg STK-MED ONCE IV ;  Start 4/30/20

at 13:47;  Stop 5/1/20 at 11:03;  Status DC


Potassium Acetate 30 meq/Magnesium Sulfate 20 meq/ Calcium Gluconate 10 meq/ 

Multivitamins 10 ml/Chromium/ Copper/Manganese/ Seleni/Zn 0.5 ml/ Insulin Human 

Regular 30 unit/ Potassium Chloride 30 meq/ Total Parenteral Nutrition/Amino 

Acids/Dextrose/ Fat Emulsion Intravenous 1,920 ml @  80 mls/hr TPN  CONT IV  

Last administered on 5/1/20at 22:34;  Start 5/1/20 at 22:00;  Stop 5/2/20 at 

21:59;  Status DC


Potassium Chloride/Water 100 ml @  100 mls/hr Q1H IV  Last administered on 

5/2/20at 13:05;  Start 5/2/20 at 07:00;  Stop 5/2/20 at 10:59;  Status DC


Magnesium Sulfate 50 ml @ 25 mls/hr 1X  ONCE IV  Last administered on 5/2/20at 

10:34;  Start 5/2/20 at 10:30;  Stop 5/2/20 at 12:29;  Status DC


Potassium Chloride 75 meq/ Magnesium Sulfate 20 meq/Calcium Gluconate 10 meq/ 

Multivitamins 10 ml/Chromium/ Copper/Manganese/ Seleni/Zn 0.5 ml/ Insulin Human 

Regular 30 unit/ Total Parenteral Nutrition/Amino Acids/Dextrose/ Fat Emulsion 

Intravenous 1,920 ml @  80 mls/hr TPN  CONT IV  Last administered on 5/2/20at 

21:51;  Start 5/2/20 at 22:00;  Stop 5/3/20 at 22:00;  Status DC


Potassium Chloride 75 meq/ Magnesium Sulfate 20 meq/Calcium Gluconate 10 meq/ 

Multivitamins 10 ml/Chromium/ Copper/Manganese/ Seleni/Zn 0.5 ml/ Insulin Human 

Regular 25 unit/ Total Parenteral Nutrition/Amino Acids/Dextrose/ Fat Emulsion 

Intravenous 1,920 ml @  80 mls/hr TPN  CONT IV  Last administered on 5/3/20at 

22:04;  Start 5/3/20 at 22:00;  Stop 5/4/20 at 21:59;  Status DC


Hydromorphone HCl (Dilaudid) 0.4 mg PRN Q4HRS  PRN IVP PAIN Last administered on

5/4/20at 10:57;  Start 5/4/20 at 09:00;  Stop 5/4/20 at 18:59;  Status DC


Micafungin Sodium 100 mg/Dextrose 100 ml @  100 mls/hr Q24H IV  Last 

administered on 5/26/20at 12:17;  Start 5/4/20 at 11:00;  Stop 5/27/20 at 09:59;

 Status DC


Daptomycin 485 mg/ Sodium Chloride 50 ml @  100 mls/hr Q24H IV  Last 

administered on 5/11/20at 13:10;  Start 5/4/20 at 11:00;  Stop 5/12/20 at 07:44;

 Status DC


Potassium Chloride 75 meq/ Magnesium Sulfate 15 meq/Calcium Gluconate 8 meq/ 

Multivitamins 10 ml/Chromium/ Copper/Manganese/ Seleni/Zn 0.5 ml/ Insulin Human 

Regular 25 unit/ Total Parenteral Nutrition/Amino Acids/Dextrose/ Fat Emulsion 

Intravenous 1,920 ml @  80 mls/hr TPN  CONT IV  Last administered on 5/4/20at 

23:08;  Start 5/4/20 at 22:00;  Stop 5/5/20 at 21:59;  Status DC


Haloperidol Lactate (Haldol Inj) 3 mg 1X  ONCE IVP  Last administered on 

5/4/20at 14:37;  Start 5/4/20 at 14:30;  Stop 5/4/20 at 14:31;  Status DC


Hydromorphone HCl (Dilaudid) 1 mg PRN Q4HRS  PRN IVP PAIN Last administered on 

5/18/20at 06:25;  Start 5/4/20 at 19:00;  Stop 5/18/20 at 17:10;  Status DC


Potassium Chloride 75 meq/ Magnesium Sulfate 15 meq/Calcium Gluconate 8 meq/ 

Multivitamins 10 ml/Chromium/ Copper/Manganese/ Seleni/Zn 0.5 ml/ Insulin Human 

Regular 20 unit/ Total Parenteral Nutrition/Amino Acids/Dextrose/ Fat Emulsion 

Intravenous 1,920 ml @  80 mls/hr TPN  CONT IV  Last administered on 5/5/20at 

22:10;  Start 5/5/20 at 22:00;  Stop 5/6/20 at 21:59;  Status DC


Lidocaine HCl (Buffered Lidocaine 1%) 3 ml STK-MED ONCE .ROUTE ;  Start 5/6/20 

at 11:31;  Stop 5/6/20 at 11:31;  Status DC


Lidocaine HCl (Buffered Lidocaine 1%) 3 ml STK-MED ONCE .ROUTE ;  Start 5/6/20 

at 12:28;  Stop 5/6/20 at 12:29;  Status DC


Lidocaine HCl (Buffered Lidocaine 1%) 6 ml 1X  ONCE INJ  Last administered on 

5/6/20at 12:53;  Start 5/6/20 at 12:45;  Stop 5/6/20 at 12:46;  Status DC


Potassium Chloride 75 meq/ Magnesium Sulfate 15 meq/Calcium Gluconate 8 meq/ 

Multivitamins 10 ml/Chromium/ Copper/Manganese/ Seleni/Zn 0.5 ml/ Insulin Human 

Regular 20 unit/ Total Parenteral Nutrition/Amino Acids/Dextrose/ Fat Emulsion 

Intravenous 1,920 ml @  80 mls/hr TPN  CONT IV  Last administered on 5/6/20at 

22:00;  Start 5/6/20 at 22:00;  Stop 5/7/20 at 21:59;  Status DC


Potassium Chloride 75 meq/ Magnesium Sulfate 15 meq/Calcium Gluconate 8 meq/ 

Multivitamins 10 ml/Chromium/ Copper/Manganese/ Seleni/Zn 0.5 ml/ Insulin Human 

Regular 15 unit/ Total Parenteral Nutrition/Amino Acids/Dextrose/ Fat Emulsion 

Intravenous 1,920 ml @  80 mls/hr TPN  CONT IV  Last administered on 5/7/20at 

22:28;  Start 5/7/20 at 22:00;  Stop 5/8/20 at 21:59;  Status DC


Vecuronium Bromide (Norcuron Bolus) 6 mg PRN Q6HRS  PRN IV VENT ASYNCHRONY;  

Start 5/7/20 at 19:15;  Stop 5/7/20 at 19:35;  Status DC


Bumetanide (Bumex) 2 mg 1X  ONCE IV  Last administered on 5/7/20at 22:09;  Start

5/7/20 at 19:45;  Stop 5/7/20 at 19:46;  Status DC


Lidocaine HCl (Buffered Lidocaine 1%) 3 ml STK-MED ONCE .ROUTE ;  Start 5/8/20 

at 07:59;  Stop 5/8/20 at 07:59;  Status DC


Midazolam HCl (Versed) 5 mg STK-MED ONCE .ROUTE ;  Start 5/8/20 at 08:36;  Stop 

5/8/20 at 08:36;  Status DC


Fentanyl Citrate (Fentanyl 5ml Vial) 250 mcg STK-MED ONCE .ROUTE ;  Start 5/8/20

at 08:36;  Stop 5/8/20 at 08:37;  Status DC


Lidocaine HCl (Buffered Lidocaine 1%) 3 ml 1X  ONCE IJ  Last administered on 

5/8/20at 09:30;  Start 5/8/20 at 09:15;  Stop 5/8/20 at 09:16;  Status DC


Midazolam HCl (Versed) 5 mg 1X  ONCE IV  Last administered on 5/8/20at 09:30;  

Start 5/8/20 at 09:15;  Stop 5/8/20 at 09:16;  Status DC


Fentanyl Citrate (Fentanyl 5ml Vial) 250 mcg 1X  ONCE IV  Last administered on 

5/8/20at 09:30;  Start 5/8/20 at 09:15;  Stop 5/8/20 at 09:16;  Status DC


Bumetanide (Bumex) 2 mg DAILY IV  Last administered on 5/18/20at 08:07;  Start 

5/8/20 at 10:00;  Stop 5/18/20 at 17:15;  Status DC


Potassium Chloride 75 meq/ Magnesium Sulfate 15 meq/ Multivitamins 10 ml/Chromiu

m/ Copper/Manganese/ Seleni/Zn 0.5 ml/ Insulin Human Regular 15 unit/ Total 

Parenteral Nutrition/Amino Acids/Dextrose/ Fat Emulsion Intravenous 1,920 ml @  

80 mls/hr TPN  CONT IV  Last administered on 5/8/20at 21:59;  Start 5/8/20 at 

22:00;  Stop 5/9/20 at 21:59;  Status DC


Metoclopramide HCl (Reglan Vial) 10 mg PRN Q3HRS  PRN IVP NAUSEA/VOMITING-3rd 

choice Last administered on 5/14/20at 04:25;  Start 5/9/20 at 16:45


Potassium Chloride 75 meq/ Magnesium Sulfate 15 meq/ Multivitamins 10 

ml/Chromium/ Copper/Manganese/ Seleni/Zn 0.5 ml/ Insulin Human Regular 15 unit/ 

Total Parenteral Nutrition/Amino Acids/Dextrose/ Fat Emulsion Intravenous 1,920 

ml @  80 mls/hr TPN  CONT IV  Last administered on 5/9/20at 22:41;  Start 5/9/20

at 22:00;  Stop 5/10/20 at 21:59;  Status DC


Magnesium Sulfate 50 ml @ 25 mls/hr 1X  ONCE IV  Last administered on 5/10/20at 

10:44;  Start 5/10/20 at 09:00;  Stop 5/10/20 at 10:59;  Status DC


Potassium Chloride/Water 100 ml @  100 mls/hr 1X  ONCE IV  Last administered on 

5/10/20at 09:37;  Start 5/10/20 at 09:00;  Stop 5/10/20 at 09:59;  Status DC


Duloxetine HCl (Cymbalta) 30 mg DAILY PO  Last administered on 5/11/20at 09:48; 

Start 5/10/20 at 14:00;  Stop 5/13/20 at 10:25;  Status DC


Potassium Chloride 80 meq/ Magnesium Sulfate 20 meq/ Multivitamins 10 

ml/Chromium/ Copper/Manganese/ Seleni/Zn 0.5 ml/ Insulin Human Regular 15 unit/ 

Total Parenteral Nutrition/Amino Acids/Dextrose/ Fat Emulsion Intravenous 1,920 

ml @  80 mls/hr TPN  CONT IV  Last administered on 5/10/20at 21:42;  Start 

5/10/20 at 22:00;  Stop 5/11/20 at 21:59;  Status DC


Potassium Chloride 80 meq/ Magnesium Sulfate 20 meq/ Multivitamins 10 

ml/Chromium/ Copper/Manganese/ Seleni/Zn 0.5 ml/ Insulin Human Regular 15 unit/ 

Total Parenteral Nutrition/Amino Acids/Dextrose/ Fat Emulsion Intravenous 1,920 

ml @  80 mls/hr TPN  CONT IV  Last administered on 5/11/20at 22:20;  Start 

5/11/20 at 22:00;  Stop 5/12/20 at 21:59;  Status DC


Lidocaine HCl (Buffered Lidocaine 1%) 3 ml [employer]-MED ONCE .ROUTE ;  Start 5/12/20 

at 09:54;  Stop 5/12/20 at 09:55;  Status DC


Hydromorphone HCl (Dilaudid Standard PCA) 12 mg STK-MED ONCE IV ;  Start 5/1/20 

at 15:50;  Stop 5/12/20 at 11:24;  Status DC


Potassium Chloride 80 meq/ Magnesium Sulfate 20 meq/ Multivitamins 10 

ml/Chromium/ Copper/Manganese/ Seleni/Zn 0.5 ml/ Insulin Human Regular 15 unit/ 

Total Parenteral Nutrition/Amino Acids/Dextrose/ Fat Emulsion Intravenous 1,920 

ml @  80 mls/hr TPN  CONT IV  Last administered on 5/12/20at 21:40;  Start 

5/12/20 at 22:00;  Stop 5/13/20 at 21:59;  Status DC


Lidocaine HCl (Buffered Lidocaine 1%) 6 ml 1X  ONCE INJ  Last administered on 

5/12/20at 14:15;  Start 5/12/20 at 14:15;  Stop 5/12/20 at 14:16;  Status DC


Potassium Chloride 80 meq/ Magnesium Sulfate 20 meq/ Multivitamins 10 

ml/Chromium/ Copper/Manganese/ Seleni/Zn 1 ml/ Insulin Human Regular 15 unit/ 

Total Parenteral Nutrition/Amino Acids/Dextrose/ Fat Emulsion Intravenous 1,920 

ml @  80 mls/hr TPN  CONT IV  Last administered on 5/13/20at 22:04;  Start 

5/13/20 at 22:00;  Stop 5/14/20 at 21:59;  Status DC


Potassium Chloride/Water 100 ml @  100 mls/hr 1X  ONCE IV  Last administered on 

5/14/20at 11:34;  Start 5/14/20 at 11:00;  Stop 5/14/20 at 11:59;  Status DC


Potassium Chloride 90 meq/ Magnesium Sulfate 20 meq/ Multivitamins 10 

ml/Chromium/ Copper/Manganese/ Seleni/Zn 1 ml/ Insulin Human Regular 15 unit/ 

Total Parenteral Nutrition/Amino Acids/Dextrose/ Fat Emulsion Intravenous 1,920 

ml @  80 mls/hr TPN  CONT IV  Last administered on 5/14/20at 22:57;  Start 

5/14/20 at 22:00;  Stop 5/15/20 at 21:59;  Status DC


Potassium Chloride 90 meq/ Magnesium Sulfate 20 meq/ Multivitamins 10 

ml/Chromium/ Copper/Manganese/ Seleni/Zn 1 ml/ Insulin Human Regular 15 unit/ 

Total Parenteral Nutrition/Amino Acids/Dextrose/ Fat Emulsion Intravenous 1,920 

ml @  80 mls/hr TPN  CONT IV  Last administered on 5/15/20at 22:48;  Start 

5/15/20 at 22:00;  Stop 5/16/20 at 21:59;  Status DC


Potassium Chloride 90 meq/ Magnesium Sulfate 20 meq/ Multivitamins 10 

ml/Chromium/ Copper/Manganese/ Seleni/Zn 1 ml/ Insulin Human Regular 15 unit/ 

Total Parenteral Nutrition/Amino Acids/Dextrose/ Fat Emulsion Intravenous 1,890 

ml @  78.75 mls/ hr TPN  CONT IV  Last administered on 5/16/20at 22:15;  Start 

5/16/20 at 22:00;  Stop 5/17/20 at 21:59;  Status DC


Linezolid/Dextrose 300 ml @  300 mls/hr Q12HR IV  Last administered on 5/19/20at

21:08;  Start 5/17/20 at 09:00;  Stop 5/20/20 at 08:11;  Status DC


Daptomycin 450 mg/ Sodium Chloride 50 ml @  100 mls/hr Q24H IV  Last 

administered on 5/20/20at 09:25;  Start 5/17/20 at 09:00;  Stop 5/21/20 at 

08:30;  Status DC


Potassium Chloride 90 meq/ Magnesium Sulfate 20 meq/ Multivitamins 10 

ml/Chromium/ Copper/Manganese/ Seleni/Zn 1 ml/ Insulin Human Regular 15 unit/ 

Total Parenteral Nutrition/Amino Acids/Dextrose/ Fat Emulsion Intravenous 1,890 

ml @  78.75 mls/ hr TPN  CONT IV  Last administered on 5/17/20at 21:34;  Start 

5/17/20 at 22:00;  Stop 5/18/20 at 21:59;  Status DC


Lorazepam (Ativan Inj) 2 mg STK-MED ONCE .ROUTE ;  Start 5/17/20 at 14:58;  Stop

5/17/20 at 14:58;  Status DC


Metoprolol Tartrate (Lopressor Vial) 5 mg 1X  ONCE IVP  Last administered on 

5/17/20at 15:31;  Start 5/17/20 at 15:15;  Stop 5/17/20 at 15:16;  Status DC


Lorazepam (Ativan Inj) 2 mg 1X  ONCE IVP  Last administered on 5/17/20at 15:30; 

Start 5/17/20 at 15:15;  Stop 5/17/20 at 15:16;  Status DC


Enoxaparin Sodium (Lovenox 40mg Syringe) 40 mg Q24H SQ  Last administered on 

6/5/20at 17:44;  Start 5/17/20 at 17:00;  Stop 6/7/20 at 06:50;  Status DC


Lorazepam (Ativan Inj) 1 mg PRN Q4HRS  PRN IVP ANXIETY / AGITATION MILD-MOD Last

administered on 5/31/20at 15:55;  Start 5/17/20 at 19:15;  Stop 6/2/20 at 11:45;

 Status DC


Lorazepam (Ativan Inj) 2 mg PRN Q4HRS  PRN IVP ANXIETY / AGITATION SEVERE Last 

administered on 6/1/20at 07:55;  Start 5/17/20 at 19:15;  Stop 6/2/20 at 11:45; 

Status DC


Fentanyl Citrate (Fentanyl 2ml Vial) 50 mcg PRN Q4HRS  PRN IVP SEVERE PAIN Last 

administered on 6/13/20at 05:15;  Start 5/18/20 at 13:15;  Stop 6/14/20 at 

09:29;  Status DC


Fentanyl Citrate (Fentanyl 2ml Vial) 25 mcg PRN Q4HRS  PRN IVP MODERATE PAIN 

Last administered on 6/13/20at 00:27;  Start 5/18/20 at 13:15;  Stop 6/14/20 at 

09:30;  Status DC


Potassium Chloride 90 meq/ Magnesium Sulfate 20 meq/ Multivitamins 10 

ml/Chromium/ Copper/Manganese/ Seleni/Zn 1 ml/ Insulin Human Regular 15 unit/ 

Total Parenteral Nutrition/Amino Acids/Dextrose/ Fat Emulsion Intravenous 1,890 

ml @  78.75 mls/ hr TPN  CONT IV  Last administered on 5/18/20at 22:18;  Start 

5/18/20 at 22:00;  Stop 5/19/20 at 21:59;  Status DC


Furosemide (Lasix) 40 mg 1X  ONCE IVP  Last administered on 5/18/20at 21:51;  

Start 5/18/20 at 21:45;  Stop 5/18/20 at 21:48;  Status DC


Albumin Human 100 ml @  100 mls/hr 1X PRN  PRN IV SEE COMMENTS;  Start 5/19/20 

at 01:30


Furosemide (Lasix) 40 mg [id number] IVP  Last administered on 6/3/20at 08:04;  Start 

5/19/20 at 14:00;  Stop 6/3/20 at 13:07;  Status DC


Potassium Chloride 90 meq/ Magnesium Sulfate 20 meq/ Multivitamins 10 

ml/Chromium/ Copper/Manganese/ Seleni/Zn 1 ml/ Insulin Human Regular 15 unit/ 

Total Parenteral Nutrition/Amino Acids/Dextrose/ Fat Emulsion Intravenous 1,800 

ml @  75 mls/hr TPN  CONT IV  Last administered on 5/19/20at 22:31;  Start 

5/19/20 at 22:00;  Stop 5/20/20 at 21:59;  Status DC


Potassium Chloride 90 meq/ Magnesium Sulfate 20 meq/ Multivitamins 10 

ml/Chromium/ Copper/Manganese/ Seleni/Zn 1 ml/ Insulin Human Regular 15 unit/ T

otal Parenteral Nutrition/Amino Acids/Dextrose/ Fat Emulsion Intravenous 1,800 

ml @  75 mls/hr TPN  CONT IV  Last administered on 5/20/20at 22:28;  Start 

5/20/20 at 22:00;  Stop 5/21/20 at 21:59;  Status DC


Potassium Chloride 110 meq/ Magnesium Sulfate 20 meq/ Multivitamins 10 

ml/Chromium/ Copper/Manganese/ Seleni/Zn 1 ml/ Insulin Human Regular 15 unit/ 

Total Parenteral Nutrition/Amino Acids/Dextrose/ Fat Emulsion Intravenous 1,800 

ml @  75 mls/hr TPN  CONT IV  Last administered on 5/21/20at 22:01;  Start 5 /21/20 at 22:00;  Stop 5/22/20 at 21:59;  Status DC


Saliva Substitute (Biotene Moisturizing Mouth) 2 spray PRN Q15MIN  PRN PO DRY 

MOUTH;  Start 5/21/20 at 11:00


Potassium Chloride 110 meq/ Magnesium Sulfate 20 meq/ Multivitamins 10 

ml/Chromium/ Copper/Manganese/ Seleni/Zn 1 ml/ Insulin Human Regular 15 unit/ 

Total Parenteral Nutrition/Amino Acids/Dextrose/ Fat Emulsion Intravenous 1,800 

ml @  75 mls/hr TPN  CONT IV  Last administered on 5/22/20at 22:21;  Start 

5/22/20 at 22:00;  Stop 5/23/20 at 21:59;  Status DC


Potassium Chloride 110 meq/ Magnesium Sulfate 20 meq/ Multivitamins 10 

ml/Chromium/ Copper/Manganese/ Seleni/Zn 1 ml/ Insulin Human Regular 15 unit/ 

Total Parenteral Nutrition/Amino Acids/Dextrose/ Fat Emulsion Intravenous 1,800 

ml @  75 mls/hr TPN  CONT IV  Last administered on 5/23/20at 22:04;  Start 

5/23/20 at 22:00;  Stop 5/24/20 at 21:59;  Status DC


Potassium Chloride 110 meq/ Magnesium Sulfate 20 meq/ Multivitamins 10 

ml/Chromium/ Copper/Manganese/ Seleni/Zn 1 ml/ Insulin Human Regular 15 unit/ 

Total Parenteral Nutrition/Amino Acids/Dextrose/ Fat Emulsion Intravenous 1,800 

ml @  75 mls/hr TPN  CONT IV  Last administered on 5/24/20at 22:48;  Start 

5/24/20 at 22:00;  Stop 5/25/20 at 21:59;  Status DC


Potassium Chloride 70 meq/ Magnesium Sulfate 20 meq/ Multivitamins 10 

ml/Chromium/ Copper/Manganese/ Seleni/Zn 1 ml/ Insulin Human Regular 15 unit/ 

Total Parenteral Nutrition/Amino Acids/Dextrose/ Fat Emulsion Intravenous 1,800 

ml @  75 mls/hr TPN  CONT IV  Last administered on 5/25/20at 21:39;  Start 5 /25/20 at 22:00;  Stop 5/26/20 at 21:59;  Status DC


Meropenem 500 mg/ Sodium Chloride 50 ml @  100 mls/hr Q6HRS IV  Last adminis

tered on 5/27/20at 06:02;  Start 5/25/20 at 18:00;  Stop 5/27/20 at 09:59;  

Status DC


Barium Sulfate (Varibar Thin Liquid Apple) 148 gm 1X  ONCE PO ;  Start 5/26/20 

at 11:45;  Stop 5/26/20 at 11:49;  Status DC


Potassium Chloride 70 meq/ Magnesium Sulfate 20 meq/ Multivitamins 10 

ml/Chromium/ Copper/Manganese/ Seleni/Zn 1 ml/ Insulin Human Regular 15 unit/ 

Total Parenteral Nutrition/Amino Acids/Dextrose/ Fat Emulsion Intravenous 1,800 

ml @  75 mls/hr TPN  CONT IV  Last administered on 5/26/20at 22:27;  Start 

5/26/20 at 22:00;  Stop 5/27/20 at 21:59;  Status DC


Piperacillin Sod/ Tazobactam Sod 3.375 gm/Sodium Chloride 50 ml @  100 mls/hr 

Q6HRS IV  Last administered on 6/4/20at 06:10;  Start 5/27/20 at 12:00;  Stop 

6/4/20 at 07:26;  Status DC


Potassium Chloride 70 meq/ Magnesium Sulfate 20 meq/ Multivitamins 10 

ml/Chromium/ Copper/Manganese/ Seleni/Zn 1 ml/ Insulin Human Regular 15 unit/ 

Total Parenteral Nutrition/Amino Acids/Dextrose/ Fat Emulsion Intravenous 1,800 

ml @  75 mls/hr TPN  CONT IV  Last administered on 5/27/20at 22:03;  Start 

5/27/20 at 22:00;  Stop 5/28/20 at 21:59;  Status DC


Potassium Chloride 70 meq/ Magnesium Sulfate 20 meq/ Multivitamins 10 

ml/Chromium/ Copper/Manganese/ Seleni/Zn 1 ml/ Insulin Human Regular 15 unit/ 

Total Parenteral Nutrition/Amino Acids/Dextrose/ Fat Emulsion Intravenous 1,800 

ml @  75 mls/hr TPN  CONT IV  Last administered on 5/28/20at 22:33;  Start 

5/28/20 at 22:00;  Stop 5/29/20 at 21:59;  Status DC


Potassium Chloride 70 meq/ Magnesium Sulfate 20 meq/ Multivitamins 10 ml/C

hromium/ Copper/Manganese/ Seleni/Zn 1 ml/ Insulin Human Regular 15 unit/ Total 

Parenteral Nutrition/Amino Acids/Dextrose/ Fat Emulsion Intravenous 1,800 ml @  

75 mls/hr TPN  CONT IV  Last administered on 5/29/20at 23:13;  Start 5/29/20 at 

22:00;  Stop 5/30/20 at 21:59;  Status DC


Potassium Chloride 80 meq/ Magnesium Sulfate 20 meq/ Multivitamins 10 

ml/Chromium/ Copper/Manganese/ Seleni/Zn 1 ml/ Insulin Human Regular 15 unit/ 

Total Parenteral Nutrition/Amino Acids/Dextrose/ Fat Emulsion Intravenous 1,800 

ml @  75 mls/hr TPN  CONT IV  Last administered on 5/30/20at 22:30;  Start 

5/30/20 at 22:00;  Stop 5/31/20 at 21:59;  Status DC


Potassium Chloride 80 meq/ Magnesium Sulfate 20 meq/ Multivitamins 10 

ml/Chromium/ Copper/Manganese/ Seleni/Zn 1 ml/ Insulin Human Regular 15 unit/ 

Total Parenteral Nutrition/Amino Acids/Dextrose/ Fat Emulsion Intravenous 1,800 

ml @  75 mls/hr TPN  CONT IV  Last administered on 5/31/20at 21:54;  Start 

5/31/20 at 22:00;  Stop 6/1/20 at 21:59;  Status DC


Potassium Chloride/Water 100 ml @  100 mls/hr 1X  ONCE IV  Last administered on 

6/1/20at 10:15;  Start 6/1/20 at 10:00;  Stop 6/1/20 at 10:59;  Status DC


Potassium Chloride 90 meq/ Magnesium Sulfate 20 meq/ Multivitamins 10 

ml/Chromium/ Copper/Manganese/ Seleni/Zn 1 ml/ Insulin Human Regular 20 unit/ 

Total Parenteral Nutrition/Amino Acids/Dextrose/ Fat Emulsion Intravenous 1,800 

ml @  75 mls/hr TPN  CONT IV  Last administered on 6/1/20at 22:28;  Start 6/1/20

at 22:00;  Stop 6/2/20 at 21:59;  Status DC


Potassium Chloride 90 meq/ Magnesium Sulfate 20 meq/ Multivitamins 10 

ml/Chromium/ Copper/Manganese/ Seleni/Zn 1 ml/ Insulin Human Regular 20 unit/ 

Total Parenteral Nutrition/Amino Acids/Dextrose/ Fat Emulsion Intravenous 1,800 

ml @  75 mls/hr TPN  CONT IV  Last administered on 6/2/20at 22:08;  Start 6/2/20

at 22:00;  Stop 6/3/20 at 21:59;  Status DC


Lorazepam (Ativan Inj) 0.25 mg PRN Q4HRS  PRN IVP ANXIETY / AGITATION Last 

administered on 7/11/20at 05:34;  Start 6/3/20 at 07:30


Potassium Chloride 90 meq/ Magnesium Sulfate 20 meq/ Multivitamins 10 

ml/Chromium/ Copper/Manganese/ Seleni/Zn 1 ml/ Insulin Human Regular 20 unit/ 

Total Parenteral Nutrition/Amino Acids/Dextrose/ Fat Emulsion Intravenous 1,800 

ml @  75 mls/hr TPN  CONT IV  Last administered on 6/3/20at 23:13;  Start 6/3/20

at 22:00;  Stop 6/4/20 at 21:59;  Status DC


Furosemide (Lasix) 40 mg DAILY IVP  Last administered on 6/5/20at 11:14;  Start 

6/3/20 at 13:30;  Stop 6/7/20 at 09:12;  Status DC


Fluoxetine HCl (PROzac) 20 mg QHS PEG  Last administered on 7/10/20at 20:40;  

Start 6/4/20 at 21:00


Fentanyl (Duragesic 50mcg/ Hr Patch) 1 patch Q72H TD  Last administered on 

6/4/20at 21:22;  Start 6/4/20 at 21:00;  Stop 6/13/20 at 12:00;  Status DC


Potassium Chloride 40 meq/ Potassium Acetate 60 meq/Magnesium Sulfate 10 meq/ 

Multivitamins 10 ml/Chromium/ Copper/Manganese/ Seleni/Zn 1 ml/ Insulin Human 

Regular 20 unit/ Total Parenteral Nutrition/Amino Acids/Dextrose/ Fat Emulsion 

Intravenous 1,800 ml @  75 mls/hr TPN  CONT IV  Last administered on 6/5/20at 

00:03;  Start 6/4/20 at 22:00;  Stop 6/5/20 at 21:59;  Status DC


Potassium Acetate 80 meq/Magnesium Sulfate 5 meq/ Multivitamins 10 ml/Chromium/ 

Copper/Manganese/ Seleni/Zn 1 ml/ Insulin Human Regular 20 unit/ Total 

Parenteral Nutrition/Amino Acids/Dextrose/ Fat Emulsion Intravenous 1,920 ml @  

80 mls/hr TPN  CONT IV  Last administered on 6/5/20at 21:59;  Start 6/5/20 at 

22:00;  Stop 6/6/20 at 21:59;  Status DC


Potassium Acetate 60 meq/Magnesium Sulfate 5 meq/ Multivitamins 10 ml/Chromium/ 

Copper/Manganese/ Seleni/Zn 1 ml/ Insulin Human Regular 30 unit/ Total 

Parenteral Nutrition/Amino Acids/Dextrose/ Fat Emulsion Intravenous 1,920 ml @  

80 mls/hr TPN  CONT IV  Last administered on 6/6/20at 21:54;  Start 6/6/20 at 

22:00;  Stop 6/7/20 at 21:59;  Status DC


Norepinephrine Bitartrate 8 mg/ Dextrose 258 ml @  13.332 mls/ hr CONT  PRN IV 

PER PROTOCOL Last administered on 7/2/20at 09:09;  Start 6/7/20 at 06:30


Albumin Human 500 ml @  125 mls/hr 1X  ONCE IV  Last administered on 6/7/20at 

08:10;  Start 6/7/20 at 08:15;  Stop 6/7/20 at 12:14;  Status DC


Potassium Acetate 40 meq/Magnesium Sulfate 5 meq/ Multivitamins 10 ml/Chromium/ 

Copper/Manganese/ Seleni/Zn 1 ml/ Insulin Human Regular 30 unit/ Total 

Parenteral Nutrition/Amino Acids/Dextrose/ Fat Emulsion Intravenous 1,920 ml @  

80 mls/hr TPN  CONT IV  Last administered on 6/7/20at 22:23;  Start 6/7/20 at 

22:00;  Stop 6/8/20 at 21:59;  Status DC


Meropenem 1 gm/ Sodium Chloride 100 ml @  200 mls/hr Q8HRS IV ;  Start 6/7/20 at

14:00;  Status Cancel


Meropenem 1 gm/ Sodium Chloride 100 ml @  200 mls/hr Q8HRS IV  Last administered

on 6/7/20at 11:04;  Start 6/7/20 at 10:00;  Stop 6/7/20 at 13:00;  Status DC


Meropenem 1 gm/ Sodium Chloride 100 ml @  200 mls/hr Q12HR IV  Last administered

on 6/25/20at 08:27;  Start 6/7/20 at 21:00;  Stop 6/25/20 at 08:56;  Status DC


Sodium Chloride 1,000 ml @  1,000 mls/hr 1X  ONCE IV  Last administered on 

6/7/20at 11:06;  Start 6/7/20 at 10:45;  Stop 6/7/20 at 11:44;  Status DC


Micafungin Sodium 100 mg/Dextrose 100 ml @  100 mls/hr Q24H IV  Last 

administered on 6/24/20at 12:34;  Start 6/7/20 at 11:00;  Stop 6/25/20 at 08:56;

 Status DC


Daptomycin 410 mg/ Sodium Chloride 50 ml @  100 mls/hr Q24H IV  Last adminis

tered on 6/9/20at 13:33;  Start 6/7/20 at 14:00;  Stop 6/10/20 at 08:30;  Status

DC


Midazolam HCl (Versed) 2 mg STK-MED ONCE .ROUTE ;  Start 6/7/20 at 14:47;  Stop 

6/7/20 at 14:48;  Status DC


Fentanyl Citrate (Fentanyl 2ml Vial) 100 mcg STK-MED ONCE .ROUTE ;  Start 6/7/20

at 14:47;  Stop 6/7/20 at 14:48;  Status DC


Flumazenil (Romazicon) 0.5 mg STK-MED ONCE IV ;  Start 6/7/20 at 14:48;  Stop 

6/7/20 at 14:48;  Status DC


Naloxone HCl (Narcan) 0.4 mg STK-MED ONCE .ROUTE ;  Start 6/7/20 at 14:48;  Stop

6/7/20 at 14:48;  Status DC


Lidocaine HCl (Lidocaine 1% 20ml Vial) 20 ml STK-MED ONCE .ROUTE ;  Start 6/7/20

at 14:48;  Stop 6/7/20 at 14:48;  Status DC


Midazolam HCl (Versed) 2 mg 1X  ONCE IV  Last administered on 6/7/20at 15:28;  

Start 6/7/20 at 15:00;  Stop 6/7/20 at 15:01;  Status DC


Fentanyl Citrate (Fentanyl 2ml Vial) 100 mcg 1X  ONCE IV  Last administered on 

6/7/20at 15:28;  Start 6/7/20 at 15:00;  Stop 6/7/20 at 15:01;  Status DC


Lidocaine HCl (Lidocaine 1% 20ml Vial) 20 ml 1X  ONCE INJ  Last administered on 

6/7/20at 15:30;  Start 6/7/20 at 15:00;  Stop 6/7/20 at 15:01;  Status DC


Sodium Chloride 1,000 ml @  100 mls/hr Q10H IV  Last administered on 6/16/20at 

07:30;  Start 6/7/20 at 20:00;  Stop 6/16/20 at 11:26;  Status DC


Sodium Bicarbonate (Sodium Bicarb Adult 8.4% Syr) 50 meq 1X  ONCE IV  Last 

administered on 6/7/20at 21:47;  Start 6/7/20 at 22:00;  Stop 6/7/20 at 22:01;  

Status DC


Potassium Acetate 40 meq/Magnesium Sulfate 5 meq/ Multivitamins 10 ml/Chromium/ 

Copper/Manganese/ Seleni/Zn 1 ml/ Insulin Human Regular 30 unit/ Total 

Parenteral Nutrition/Amino Acids/Dextrose/ Fat Emulsion Intravenous 1,920 ml @  

80 mls/hr TPN  CONT IV  Last administered on 6/8/20at 22:28;  Start 6/8/20 at 

22:00;  Stop 6/9/20 at 21:59;  Status DC


Sodium Chloride 500 ml @  500 mls/hr 1X  ONCE IV  Last administered on 6/9/20at 

06:39;  Start 6/9/20 at 06:45;  Stop 6/9/20 at 07:44;  Status DC


Potassium Acetate 40 meq/Magnesium Sulfate 5 meq/ Multivitamins 10 ml/Chromium/ 

Copper/Manganese/ Seleni/Zn 1 ml/ Insulin Human Regular 30 unit/ Total 

Parenteral Nutrition/Amino Acids/Dextrose/ Fat Emulsion Intravenous 1,920 ml @  

80 mls/hr TPN  CONT IV  Last administered on 6/9/20at 22:03;  Start 6/9/20 at 

22:00;  Stop 6/10/20 at 21:59;  Status DC


Metoprolol Tartrate (Lopressor Vial) 5 mg PRN Q6HRS  PRN IVP HYPERTENSION Last 

administered on 7/11/20at 08:03;  Start 6/10/20 at 09:00


Potassium Acetate 40 meq/Magnesium Sulfate 5 meq/ Multivitamins 10 ml/Chromium/ 

Copper/Manganese/ Seleni/Zn 1 ml/ Insulin Human Regular 30 unit/ Total 

Parenteral Nutrition/Amino Acids/Dextrose/ Fat Emulsion Intravenous 1,920 ml @  

80 mls/hr TPN  CONT IV  Last administered on 6/10/20at 21:26;  Start 6/10/20 at 

22:00;  Stop 6/11/20 at 21:59;  Status DC


Potassium Acetate 40 meq/Magnesium Sulfate 5 meq/ Multivitamins 10 ml/Chromium/ 

Copper/Manganese/ Seleni/Zn 1 ml/ Insulin Human Regular 30 unit/ Total 

Parenteral Nutrition/Amino Acids/Dextrose/ Fat Emulsion Intravenous 1,920 ml @  

80 mls/hr TPN  CONT IV  Last administered on 6/11/20at 23:23;  Start 6/11/20 at 

22:00;  Stop 6/12/20 at 21:59;  Status DC


Potassium Acetate 40 meq/Magnesium Sulfate 5 meq/ Multivitamins 10 ml/Chromium/ 

Copper/Manganese/ Seleni/Zn 1 ml/ Insulin Human Regular 30 unit/ Total 

Parenteral Nutrition/Amino Acids/Dextrose/ Fat Emulsion Intravenous 1,920 ml @  

80 mls/hr TPN  CONT IV  Last administered on 6/12/20at 21:35;  Start 6/12/20 at 

22:00;  Stop 6/13/20 at 21:59;  Status DC


Furosemide (Lasix) 20 mg 1X  ONCE IVP  Last administered on 6/13/20at 06:26;  

Start 6/13/20 at 06:15;  Stop 6/13/20 at 06:16;  Status DC


Methylprednisolone Sodium Succinate (SOLU-Medrol 125MG VIAL) 125 mg 1X  ONCE IV 

Last administered on 6/13/20at 06:26;  Start 6/13/20 at 06:15;  Stop 6/13/20 at 

06:16;  Status DC


Albuterol/ Ipratropium (Duoneb) 3 ml Q4HRS NEB  Last administered on 7/10/20at 

20:06;  Start 6/13/20 at 08:00


Fentanyl Citrate 30 ml @ 0 mls/hr CONT  PRN IV SEE PROTOCOL Last administered on

7/4/20at 08:03;  Start 6/13/20 at 06:00;  Stop 7/4/20 at 12:42;  Status DC


Propofol 100 ml @ 0 mls/hr CONT  PRN IV SEE PROTOCOL Last administered on 

6/20/20at 23:50;  Start 6/13/20 at 06:00


Fentanyl Citrate (Fentanyl 2ml Vial) 25 mcg PRN Q1HR  PRN IV SEE COMMENTS;  

Start 6/13/20 at 06:00


Fentanyl Citrate (Fentanyl 2ml Vial) 50 mcg PRN Q1HR  PRN IV SEE COMMENTS Last 

administered on 7/11/20at 04:36;  Start 6/13/20 at 06:00


Chlorhexidine Gluconate (Peridex) 15 ml BID MM ;  Start 6/13/20 at 09:00;  Stop 

6/13/20 at 07:58;  Status DC


Potassium Acetate 40 meq/Magnesium Sulfate 5 meq/ Multivitamins 10 ml/Chromium/ 

Copper/Manganese/ Seleni/Zn 1 ml/ Insulin Human Regular 30 unit/ Total 

Parenteral Nutrition/Amino Acids/Dextrose/ Fat Emulsion Intravenous 1,920 ml @  

80 mls/hr TPN  CONT IV  Last administered on 6/13/20at 21:19;  Start 6/13/20 at 

22:00;  Stop 6/14/20 at 21:59;  Status DC


Acetylcysteine (Mucomyst 20% Resp Treatment) 600 mg BID NEB  Last administered 

on 6/19/20at 09:33;  Start 6/13/20 at 21:00;  Stop 6/19/20 at 10:39;  Status DC


Magnesium Sulfate 100 ml @  25 mls/hr 1X  ONCE IV  Last administered on 

6/13/20at 15:48;  Start 6/13/20 at 15:45;  Stop 6/13/20 at 19:44;  Status DC


Potassium Acetate 40 meq/Magnesium Sulfate 5 meq/ Multivitamins 10 ml/Chromium/ 

Copper/Manganese/ Seleni/Zn 1 ml/ Insulin Human Regular 30 unit/ Total 

Parenteral Nutrition/Amino Acids/Dextrose/ Fat Emulsion Intravenous 1,920 ml @  

80 mls/hr TPN  CONT IV  Last administered on 6/14/20at 21:35;  Start 6/14/20 at 

22:00;  Stop 6/15/20 at 21:59;  Status DC


Potassium Chloride/Water 100 ml @  100 mls/hr Q1H IV  Last administered on 

6/15/20at 08:31;  Start 6/15/20 at 07:00;  Stop 6/15/20 at 08:59;  Status DC


Potassium Acetate 40 meq/Magnesium Sulfate 5 meq/ Multivitamins 10 ml/Chromium/ 

Copper/Manganese/ Seleni/Zn 1 ml/ Insulin Human Regular 30 unit/ Total 

Parenteral Nutrition/Amino Acids/Dextrose/ Fat Emulsion Intravenous 1,920 ml @  

80 mls/hr TPN  CONT IV  Last administered on 6/15/20at 21:54;  Start 6/15/20 at 

22:00;  Stop 6/16/20 at 19:34;  Status DC


Lidocaine HCl (Buffered Lidocaine 1%) 3 ml STK-MED ONCE .ROUTE ;  Start 6/15/20 

at 12:14;  Stop 6/15/20 at 12:14;  Status DC


Lidocaine HCl (Buffered Lidocaine 1%) 3 ml 1X  ONCE IJ  Last administered on 

6/15/20at 13:11;  Start 6/15/20 at 13:00;  Stop 6/15/20 at 13:01;  Status DC


Magnesium Sulfate 50 ml @ 25 mls/hr 1X  ONCE IV ;  Start 6/16/20 at 08:15;  Stop

6/16/20 at 10:14;  Status DC


Potassium Acetate 40 meq/Magnesium Sulfate 10 meq/ Multivitamins 10 ml/Chromium/

Copper/Manganese/ Seleni/Zn 1 ml/ Insulin Human Regular 20 unit/ Total 

Parenteral Nutrition/Amino Acids/Dextrose/ Fat Emulsion Intravenous 1,920 ml @  

80 mls/hr TPN  CONT IV  Last administered on 6/16/20at 21:32;  Start 6/16/20 at 

22:00;  Stop 6/17/20 at 21:59;  Status DC


Potassium Chloride/Water 100 ml @  100 mls/hr Q1H IV  Last administered on 

6/17/20at 09:12;  Start 6/17/20 at 08:00;  Stop 6/17/20 at 09:59;  Status DC


Alteplase, Recombinant (Cathflo For Central Catheter Clearance) 4 mg 1X  ONCE 

INT CAT ;  Start 6/17/20 at 09:15;  Stop 6/17/20 at 09:16;  Status UNV


Alteplase, Recombinant (Cathflo For Central Catheter Clearance) 4 mg 1X  ONCE 

INT CAT ;  Start 6/17/20 at 09:15;  Stop 6/17/20 at 09:16;  Status UNV


Alteplase, Recombinant (Cathflo For Central Catheter Clearance) 4 mg 1X  ONCE 

INT CAT ;  Start 6/17/20 at 09:15;  Stop 6/17/20 at 09:16;  Status UNV


Alteplase, Recombinant 4 mg/ Sodium Chloride 20 ml @ 20 mls/hr 1X  ONCE IV  Last

administered on 6/17/20at 10:10;  Start 6/17/20 at 10:00;  Stop 6/17/20 at 

10:59;  Status DC


Alteplase, Recombinant 4 mg/ Sodium Chloride 20 ml @ 20 mls/hr 1X  ONCE IV  Last

administered on 6/17/20at 10:09;  Start 6/17/20 at 10:00;  Stop 6/17/20 at 

10:59;  Status DC


Alteplase, Recombinant 4 mg/ Sodium Chloride 20 ml @ 20 mls/hr 1X  ONCE IV  Last

administered on 6/17/20at 10:09;  Start 6/17/20 at 10:00;  Stop 6/17/20 at 

10:59;  Status DC


Potassium Acetate 60 meq/Magnesium Sulfate 10 meq/ Multivitamins 10 ml/Chromium/

Copper/Manganese/ Seleni/Zn 1 ml/ Insulin Human Regular 20 unit/ Total 

Parenteral Nutrition/Amino Acids/Dextrose/ Fat Emulsion Intravenous 1,920 ml @  

80 mls/hr TPN  CONT IV  Last administered on 6/17/20at 21:55;  Start 6/17/20 at 

22:00;  Stop 6/18/20 at 21:59;  Status DC


Albumin Human 500 ml @  125 mls/hr 1X  ONCE IV  Last administered on 6/18/20at 

12:01;  Start 6/18/20 at 11:15;  Stop 6/18/20 at 15:14;  Status DC


Sodium Chloride 500 ml @  500 mls/hr 1X  ONCE IV  Last administered on 6/18/20at

13:50;  Start 6/18/20 at 11:15;  Stop 6/18/20 at 12:14;  Status DC


Potassium Acetate 60 meq/Magnesium Sulfate 14 meq/ Multivitamins 10 ml/Chromium/

Copper/Manganese/ Seleni/Zn 1 ml/ Insulin Human Regular 20 unit/ Total 

Parenteral Nutrition/Amino Acids/Dextrose/ Fat Emulsion Intravenous 1,920 ml @  

80 mls/hr TPN  CONT IV  Last administered on 6/18/20at 22:26;  Start 6/18/20 at 

22:00;  Stop 6/19/20 at 21:59;  Status DC


Ciprofloxacin/ Dextrose 200 ml @  200 mls/hr Q12HR IV  Last administered on 

6/25/20at 08:27;  Start 6/18/20 at 21:00;  Stop 6/25/20 at 08:56;  Status DC


Albumin Human 250 ml @  62.5 mls/hr 1X  ONCE IV  Last administered on 6/19/20at 

11:09;  Start 6/19/20 at 11:00;  Stop 6/19/20 at 14:59;  Status DC


Furosemide (Lasix) 20 mg 1X  ONCE IVP  Last administered on 6/19/20at 14:52;  

Start 6/19/20 at 10:45;  Stop 6/19/20 at 10:49;  Status DC


Potassium Acetate 60 meq/Magnesium Sulfate 14 meq/ Multivitamins 10 ml/Chromium/

Copper/Manganese/ Seleni/Zn 1 ml/ Insulin Human Regular 15 unit/ Total Parent

eral Nutrition/Amino Acids/Dextrose/ Fat Emulsion Intravenous 1,920 ml @  80 

mls/hr TPN  CONT IV  Last administered on 6/19/20at 22:08;  Start 6/19/20 at 

22:00;  Stop 6/20/20 at 21:59;  Status DC


Potassium Acetate 60 meq/Magnesium Sulfate 14 meq/ Multivitamins 10 ml/Chromium/

Copper/Manganese/ Seleni/Zn 1 ml/ Insulin Human Regular 15 unit/ Total 

Parenteral Nutrition/Amino Acids/Dextrose/ Fat Emulsion Intravenous 1,920 ml @  

80 mls/hr TPN  CONT IV  Last administered on 6/20/20at 22:12;  Start 6/20/20 at 

22:00;  Stop 6/21/20 at 21:59;  Status DC


Potassium Acetate 60 meq/Magnesium Sulfate 14 meq/ Multivitamins 10 ml/Chromium/

Copper/Manganese/ Seleni/Zn 1 ml/ Insulin Human Regular 15 unit/ Total 

Parenteral Nutrition/Amino Acids/Dextrose/ Fat Emulsion Intravenous 1,920 ml @  

80 mls/hr TPN  CONT IV  Last administered on 6/21/20at 22:22;  Start 6/21/20 at 

22:00;  Stop 6/22/20 at 21:59;  Status DC


Furosemide (Lasix) 20 mg 1X  ONCE IVP  Last administered on 6/22/20at 11:07;  

Start 6/22/20 at 10:30;  Stop 6/22/20 at 10:34;  Status DC


Potassium Acetate 60 meq/Magnesium Sulfate 14 meq/ Multivitamins 10 ml/Chromium/

Copper/Manganese/ Seleni/Zn 1 ml/ Insulin Human Regular 15 unit/ Sodium Chloride

20 meq/Total Parenteral Nutrition/Amino Acids/Dextrose/ Fat Emulsion Intravenous

1,920 ml @  80 mls/hr TPN  CONT IV  Last administered on 6/22/20at 21:54;  Start

6/22/20 at 22:00;  Stop 6/23/20 at 21:59;  Status DC


Potassium Acetate 30 meq/Magnesium Sulfate 14 meq/ Multivitamins 10 ml/Chromium/

Copper/Manganese/ Seleni/Zn 1 ml/ Insulin Human Regular 15 unit/ Sodium Chloride

20 meq/Potassium Chloride 30 meq/ Total Parenteral Nutrition/Amino 

Acids/Dextrose/ Fat Emulsion Intravenous 1,920 ml @  80 mls/hr TPN  CONT IV  

Last administered on 6/23/20at 21:46;  Start 6/23/20 at 22:00;  Stop 6/24/20 at 

21:59;  Status DC


Sodium Chloride 80 meq/Potassium Chloride 30 meq/ Potassium Acetate 30 

meq/Magnesium Sulfate 14 meq/ Multivitamins 10 ml/Chromium/ Copper/Manganese/ 

Seleni/Zn 1 ml/ Insulin Human Regular 15 unit/ Total Parenteral Nutrition/Amino 

Acids/Dextrose/ Fat Emulsion Intravenous 1,920 ml @  80 mls/hr TPN  CONT IV  

Last administered on 6/24/20at 22:33;  Start 6/24/20 at 22:00;  Stop 6/25/20 at 

21:59;  Status DC


Furosemide (Lasix) 40 mg 1X  ONCE IVP  Last administered on 6/24/20at 16:27;  

Start 6/24/20 at 15:30;  Stop 6/24/20 at 15:33;  Status DC


Albumin Human 250 ml @  62.5 mls/hr 1X  ONCE IV  Last administered on 6/24/20at 

16:27;  Start 6/24/20 at 15:30;  Stop 6/24/20 at 19:29;  Status DC


Sodium Chloride 80 meq/Potassium Chloride 30 meq/ Potassium Acetate 30 

meq/Magnesium Sulfate 14 meq/ Multivitamins 10 ml/Chromium/ Copper/Manganese/ 

Seleni/Zn 1 ml/ Insulin Human Regular 15 unit/ Total Parenteral Nutrition/Amino 

Acids/Dextrose/ Fat Emulsion Intravenous 1,920 ml @  80 mls/hr TPN  CONT IV  

Last administered on 6/25/20at 22:25;  Start 6/25/20 at 22:00;  Stop 6/26/20 at 

21:59;  Status DC


Sodium Chloride 80 meq/Potassium Chloride 30 meq/ Potassium Acetate 30 

meq/Magnesium Sulfate 14 meq/ Multivitamins 10 ml/Chromium/ Copper/Manganese/ 

Seleni/Zn 1 ml/ Insulin Human Regular 15 unit/ Total Parenteral Nutrition/Amino 

Acids/Dextrose/ Fat Emulsion Intravenous 1,920 ml @  80 mls/hr TPN  CONT IV  

Last administered on 6/26/20at 21:32;  Start 6/26/20 at 22:00;  Stop 6/27/20 at 

21:59;  Status DC


Sodium Chloride 80 meq/Potassium Chloride 30 meq/ Potassium Acetate 30 

meq/Magnesium Sulfate 14 meq/ Multivitamins 10 ml/Chromium/ Copper/Manganese/ 

Seleni/Zn 1 ml/ Insulin Human Regular 15 unit/ Total Parenteral Nutrition/Amino 

Acids/Dextrose/ Fat Emulsion Intravenous 1,920 ml @  80 mls/hr TPN  CONT IV  

Last administered on 6/27/20at 21:53;  Start 6/27/20 at 22:00;  Stop 6/28/20 at 

21:59;  Status DC


Acetylcysteine (Mucomyst 20% Resp Treatment) 600 mg RTBID NEB  Last administered

on 7/10/20at 20:07;  Start 6/27/20 at 12:00


Sodium Chloride 80 meq/Potassium Chloride 30 meq/ Potassium Acetate 30 

meq/Magnesium Sulfate 14 meq/ Multivitamins 10 ml/Chromium/ Copper/Manganese/ 

Seleni/Zn 1 ml/ Insulin Human Regular 15 unit/ Total Parenteral Nutrition/Amino 

Acids/Dextrose/ Fat Emulsion Intravenous 1,920 ml @  80 mls/hr TPN  CONT IV  

Last administered on 6/28/20at 22:06;  Start 6/28/20 at 22:00;  Stop 6/29/20 at 

21:59;  Status DC


Meropenem 500 mg/ Sodium Chloride 50 ml @  100 mls/hr Q6HRS IV  Last 

administered on 7/11/20at 05:34;  Start 6/28/20 at 18:00


Daptomycin 500 mg/ Sodium Chloride 50 ml @  100 mls/hr Q24H IV  Last 

administered on 7/6/20at 21:47;  Start 6/28/20 at 19:00;  Stop 7/7/20 at 08:13; 

Status DC


Sodium Chloride 80 meq/Potassium Chloride 30 meq/ Potassium Acetate 30 

meq/Magnesium Sulfate 14 meq/ Multivitamins 10 ml/Chromium/ Copper/Manganese/ 

Seleni/Zn 1 ml/ Insulin Human Regular 15 unit/ Total Parenteral Nutrition/Amino 

Acids/Dextrose/ Fat Emulsion Intravenous 1,920 ml @  80 mls/hr TPN  CONT IV  

Last administered on 6/29/20at 22:09;  Start 6/29/20 at 22:00;  Stop 6/30/20 at 

21:59;  Status DC


Heparin Sodium (Porcine) 1000 unit/Sodium Chloride 1,001 ml @  1,001 mls/hr 1X  

ONCE IRR ;  Start 6/30/20 at 06:00;  Stop 6/30/20 at 06:59;  Status DC


Propofol (Diprivan) 200 mg STK-MED ONCE IV ;  Start 6/30/20 at 07:44;  Stop 

6/30/20 at 07:44;  Status DC


Lidocaine HCl (Lidocaine Pf 2% Vial) 5 ml STK-MED ONCE .ROUTE ;  Start 6/30/20 

at 07:44;  Stop 6/30/20 at 07:44;  Status DC


Fentanyl Citrate (Fentanyl 2ml Vial) 100 mcg STK-MED ONCE .ROUTE ;  Start 

6/30/20 at 07:44;  Stop 6/30/20 at 07:44;  Status DC


Rocuronium Bromide (Zemuron) 100 mg STK-MED ONCE .ROUTE ;  Start 6/30/20 at 

07:44;  Stop 6/30/20 at 07:44;  Status DC


Micafungin Sodium 100 mg/Dextrose 100 ml @  100 mls/hr Q24H IV  Last 

administered on 7/10/20at 11:59;  Start 6/30/20 at 08:30


Bupivacaine HCl/ Epinephrine Bitart (Sensorcain-Epi 0.5%-1:278009 Mpf) 30 ml 

STK-MED ONCE .ROUTE ;  Start 6/30/20 at 08:34;  Stop 6/30/20 at 08:35;  Status 

DC


Iohexol (Omnipaque 300 Mg/ml) 50 ml STK-MED ONCE .ROUTE  Last administered on 

6/30/20at 13:30;  Start 6/30/20 at 08:35;  Stop 6/30/20 at 08:35;  Status DC


Sodium Chloride 80 meq/Potassium Chloride 30 meq/ Potassium Acetate 30 

meq/Magnesium Sulfate 14 meq/ Multivitamins 10 ml/Chromium/ Copper/Manganese/ 

Seleni/Zn 1 ml/ Insulin Human Regular 15 unit/ Total Parenteral Nutrition/Amino 

Acids/Dextrose/ Fat Emulsion Intravenous 1,920 ml @  80 mls/hr TPN  CONT IV  

Last administered on 7/1/20at 01:22;  Start 6/30/20 at 22:00;  Stop 7/1/20 at 

21:59;  Status DC


Phenylephrine HCl (Ken-Synephrine Inj) 10 mg STK-MED ONCE .ROUTE ;  Start 

6/30/20 at 10:15;  Stop 6/30/20 at 10:15;  Status DC


Desflurane (Suprane) 90 ml STK-MED ONCE IH ;  Start 6/30/20 at 10:18;  Stop 

6/30/20 at 10:19;  Status DC


Albumin Human 500 ml @  As Directed STK-MED ONCE IV ;  Start 6/30/20 at 11:06;  

Stop 6/30/20 at 11:06;  Status DC


Vasopressin (Vasostrict) 20 unit STK-MED ONCE .ROUTE ;  Start 6/30/20 at 12:23; 

Stop 6/30/20 at 12:23;  Status DC


Phenylephrine HCl (Ken-Synephrine Inj) 10 mg STK-MED ONCE .ROUTE ;  Start 

6/30/20 at 13:33;  Stop 6/30/20 at 13:33;  Status DC


Phenylephrine HCl (Ekn-Synephrine Inj) 10 mg STK-MED ONCE .ROUTE ;  Start 

6/30/20 at 13:33;  Stop 6/30/20 at 13:33;  Status DC


Ondansetron HCl (Zofran) 4 mg STK-MED ONCE .ROUTE ;  Start 6/30/20 at 13:33;  

Stop 6/30/20 at 13:33;  Status DC


Enoxaparin Sodium (Lovenox 40mg Syringe) 40 mg Q24H SQ  Last administered on 

7/11/20at 08:04;  Start 7/1/20 at 08:00


Sodium Chloride (Normal Saline Flush) 3 ml QSHIFT  PRN IV AFTER MEDS AND BLOOD 

DRAWS;  Start 6/30/20 at 14:45


Naloxone HCl (Narcan) 0.4 mg PRN Q2MIN  PRN IV SEE INSTRUCTIONS;  Start 6/30/20 

at 14:45


Sodium Chloride 1,000 ml @  25 mls/hr Q24H IV  Last administered on 7/8/20at 

21:15;  Start 6/30/20 at 14:33


Morphine Sulfate (Morphine Sulfate) 1 mg PRN Q1HR  PRN IV PAIN;  Start 6/30/20 

at 14:45


Midazolam HCl 100 mg/Sodium Chloride 100 ml @ 1 mls/hr CONT  PRN IV SEE I/O 

RECORD Last administered on 7/3/20at 18:48;  Start 6/30/20 at 14:45


Phenylephrine HCl (PHENYLEPHRINE in 0.9% NACL PF) 1 mg STK-MED ONCE IV ;  Start 

6/30/20 at 14:44;  Stop 6/30/20 at 14:45;  Status DC


Ephedrine Sulfate (ePHEDrine PF IN SALINE SYRINGE) 50 mg STK-MED ONCE IV ;  

Start 6/30/20 at 14:45;  Stop 6/30/20 at 14:45;  Status DC


Vasopressin 20 unit/Dextrose 101 ml @  12 mls/hr CONT  PRN IV SEE I/O RECORD 

Last administered on 7/7/20at 04:17;  Start 6/30/20 at 15:30


Sodium Chloride 1,000 ml @  1,000 mls/hr 1X  ONCE IV  Last administered on 

6/30/20at 15:42;  Start 6/30/20 at 15:45;  Stop 6/30/20 at 16:44;  Status DC


Albumin Human 500 ml @  125 mls/hr 1X  ONCE IV ;  Start 6/30/20 at 16:00;  Stop 

6/30/20 at 19:59;  Status DC


Albumin Human 500 ml @  125 mls/hr PRN Q1HR  PRN IV PER PROTOCOL;  Start 6/30/20

at 15:45


Magnesium Sulfate 50 ml @ 25 mls/hr 1X  ONCE IV  Last administered on 6/30/20at 

17:02;  Start 6/30/20 at 16:30;  Stop 6/30/20 at 18:29;  Status DC


Sodium Bicarbonate (Sodium Bicarb Adult 8.4% Syr) 50 meq STK-MED ONCE .ROUTE ;  

Start 6/30/20 at 16:20;  Stop 6/30/20 at 16:20;  Status DC


Sodium Bicarbonate (Sodium Bicarb Adult 8.4% Syr) 100 meq 1X  ONCE IV  Last 

administered on 6/30/20at 17:07;  Start 6/30/20 at 16:30;  Stop 6/30/20 at 16:

31;  Status DC


Sodium Bicarbonate 150 meq/Dextrose 1,150 ml @  75 mls/hr 1X  ONCE IV  Last 

administered on 6/30/20at 20:02;  Start 6/30/20 at 16:30;  Stop 7/1/20 at 07:49;

 Status DC


Sodium Chloride 80 meq/Potassium Chloride 30 meq/ Potassium Acetate 30 me

q/Magnesium Sulfate 14 meq/ Multivitamins 10 ml/Chromium/ Copper/Manganese/ 

Seleni/Zn 1 ml/ Insulin Human Regular 15 unit/ Total Parenteral Nutrition/Amino 

Acids/Dextrose/ Fat Emulsion Intravenous 1,920 ml @  80 mls/hr TPN  CONT IV  

Last administered on 7/1/20at 23:05;  Start 7/1/20 at 22:00;  Stop 7/2/20 at 

21:59;  Status DC


Sodium Chloride 100 meq/Potassium Chloride 30 meq/ Potassium Acetate 30 

meq/Magnesium Sulfate 12 meq/ Multivitamins 10 ml/Chromium/ Copper/Manganese/ 

Seleni/Zn 1 ml/ Insulin Human Regular 15 unit/ Total Parenteral Nutrition/Amino 

Acids/Dextrose/ Fat Emulsion Intravenous 1,920 ml @  80 mls/hr TPN  CONT IV  

Last administered on 7/2/20at 21:52;  Start 7/2/20 at 22:00;  Stop 7/3/20 at 

21:59;  Status DC


Sodium Chloride 100 meq/Potassium Chloride 30 meq/ Potassium Acetate 30 

meq/Magnesium Sulfate 12 meq/ Multivitamins 10 ml/Chromium/ Copper/Manganese/ 

Seleni/Zn 1 ml/ Insulin Human Regular 15 unit/ Total Parenteral Nutrition/Amino 

Acids/Dextrose/ Fat Emulsion Intravenous 1,920 ml @  80 mls/hr TPN  CONT IV  

Last administered on 7/3/20at 21:46;  Start 7/3/20 at 22:00;  Stop 7/4/20 at 

21:59;  Status DC


Sodium Chloride 100 meq/Potassium Chloride 30 meq/ Potassium Acetate 30 

meq/Magnesium Sulfate 12 meq/ Multivitamins 10 ml/Chromium/ Copper/Manganese/ 

Seleni/Zn 1 ml/ Insulin Human Regular 15 unit/ Total Parenteral Nutrition/Amino 

Acids/Dextrose/ Fat Emulsion Intravenous 1,800 ml @  75 mls/hr TPN  CONT IV  

Last administered on 7/4/20at 22:04;  Start 7/4/20 at 22:00;  Stop 7/5/20 at 

21:59;  Status DC


Fentanyl Citrate 55 ml @ 0 mls/hr CONT  PRN IV SEE COMMENTS Last administered on

7/6/20at 23:55;  Start 7/4/20 at 13:00;  Stop 7/9/20 at 17:28;  Status DC


Sodium Chloride 100 meq/Potassium Chloride 30 meq/ Potassium Acetate 30 

meq/Magnesium Sulfate 12 meq/ Multivitamins 10 ml/Chromium/ Copper/Manganese/ 

Seleni/Zn 1 ml/ Insulin Human Regular 15 unit/ Total Parenteral Nutrition/Amino 

Acids/Dextrose/ Fat Emulsion Intravenous 1,680 ml @  70 mls/hr TPN  CONT IV  

Last administered on 7/5/20at 21:23;  Start 7/5/20 at 22:00;  Stop 7/6/20 at 

21:59;  Status DC


Sodium Chloride 110 meq/Potassium Chloride 30 meq/ Potassium Acetate 30 

meq/Magnesium Sulfate 15 meq/ Multivitamins 10 ml/Chromium/ Copper/Manganese/ 

Seleni/Zn 1 ml/ Insulin Human Regular 15 unit/ Total Parenteral Nutrition/Amino 

Acids/Dextrose/ Fat Emulsion Intravenous 1,680 ml @  70 mls/hr TPN  CONT IV  

Last administered on 7/6/20at 21:48;  Start 7/6/20 at 22:00;  Stop 7/7/20 at 

21:59;  Status DC


Sodium Chloride 110 meq/Potassium Chloride 30 meq/ Potassium Acetate 30 

meq/Magnesium Sulfate 15 meq/ Multivitamins 10 ml/Chromium/ Copper/Manganese/ 

Seleni/Zn 1 ml/ Insulin Human Regular 15 unit/ Total Parenteral Nutrition/Amino 

Acids/Dextrose/ Fat Emulsion Intravenous 1,680 ml @  70 mls/hr TPN  CONT IV  

Last administered on 7/7/20at 21:33;  Start 7/7/20 at 22:00;  Stop 7/8/20 at 

21:59;  Status DC


Sodium Chloride 110 meq/Potassium Chloride 30 meq/ Potassium Acetate 30 

meq/Magnesium Sulfate 15 meq/ Multivitamins 10 ml/Chromium/ Copper/Manganese/ 

Seleni/Zn 1 ml/ Insulin Human Regular 15 unit/ Total Parenteral Nutrition/Amino 

Acids/Dextrose/ Fat Emulsion Intravenous 1,680 ml @  70 mls/hr TPN  CONT IV  

Last administered on 7/8/20at 21:51;  Start 7/8/20 at 22:00;  Stop 7/9/20 at 

21:59;  Status DC


Sodium Chloride 90 meq/Potassium Chloride 30 meq/ Potassium Acetate 30 

meq/Magnesium Sulfate 15 meq/ Multivitamins 10 ml/Chromium/ Copper/Manganese/ 

Seleni/Zn 1 ml/ Insulin Human Regular 15 unit/ Total Parenteral Nutrition/Amino 

Acids/Dextrose/ Fat Emulsion Intravenous 1,680 ml @  70 mls/hr TPN  CONT IV  

Last administered on 7/9/20at 22:38;  Start 7/9/20 at 22:00;  Stop 7/10/20 at 

21:59;  Status DC


Fentanyl Citrate 30 ml @ 0 mls/hr CONT  PRN IV SEE I/O RECORD;  Start 7/9/20 at 

17:30


Fentanyl (Duragesic 12mcg/ Hr Patch) 1 patch Q3DAYS TD  Last administered on 

7/10/20at 18:03;  Start 7/10/20 at 09:00


Sodium Chloride 90 meq/Potassium Chloride 30 meq/ Potassium Acetate 30 

meq/Magnesium Sulfate 15 meq/ Multivitamins 10 ml/Chromium/ Copper/Manganese/ 

Seleni/Zn 1 ml/ Insulin Human Regular 15 unit/ Total Parenteral Nutrition/Amino 

Acids/Dextrose/ Fat Emulsion Intravenous 1,680 ml @  70 mls/hr TPN  CONT IV  

Last administered on 7/10/20at 21:59;  Start 7/10/20 at 22:00;  Stop 7/11/20 at 

21:59





Active Scripts


Active


Reported


Bisoprolol Fumarate 5 Mg Tablet 10 Mg PO DAILY


Vitals/I & O





Vital Sign - Last 24 Hours








 7/10/20 7/10/20 7/10/20 7/10/20





 08:32 09:00 10:00 11:00


 


Pulse 108 96 96 94


 


Resp  24 24 24


 


B/P (MAP) 172/101 165/109 (127)  


 


Pulse Ox  100 100 100


 


O2 Delivery  Venturi Mask Venturi Mask Venturi Mask





 7/10/20 7/10/20 7/10/20 7/10/20





 11:16 12:00 12:00 13:00


 


Temp   99.0 





   99.0 


 


Pulse   102 104


 


Resp   24 24


 


B/P (MAP)   171/98 (122) 167/106 (126)


 


Pulse Ox 100  100 100


 


O2 Delivery Tracheal Collar Trach Collar Venturi Mask Venturi Mask


 


O2 Flow Rate 10.0 10.0  


 


    





    





 7/10/20 7/10/20 7/10/20 7/10/20





 14:00 15:00 15:40 16:00


 


Pulse 102 100  


 


Resp 22 22  


 


B/P (MAP) 159/96 (117) 156/91 (112)  


 


Pulse Ox 100 100 100 


 


O2 Delivery Venturi Mask Venturi Mask Tracheal Collar Trach Collar


 


O2 Flow Rate   10.0 10.0





 7/10/20 7/10/20 7/10/20 7/10/20





 16:00 17:00 18:00 18:03


 


Temp  99.0  





  99.0  


 


Pulse 100   


 


Resp 22 20 20 


 


B/P (MAP) 171/100 (123) 165/92 (116) 166/93 (117) 


 


Pulse Ox 100 100 100 


 


O2 Delivery Venturi Mask Venturi Mask Venturi Mask Venturi Mask


 


    





    





 7/10/20 7/10/20 7/10/20 7/10/20





 19:00 20:00 20:00 20:07


 


Temp 99.4   





 99.4   


 


Pulse 120 108  


 


Resp 20 20  


 


B/P (MAP) 158/92 (114) 171/99 (123)  


 


Pulse Ox 100 100  100


 


O2 Delivery Trach shield Trach shield Trach Collar Tracheal Collar


 


O2 Flow Rate   10.0 10.0


 


    





    





 7/10/20 7/10/20 7/10/20 7/10/20





 21:00 22:00 22:00 22:03


 


Pulse 112  110 


 


Resp 20 24 24 24


 


B/P (MAP) 163/99 (120)  165/103 (123) 


 


Pulse Ox 100 100 100 99


 


O2 Delivery Trach shield Tracheal Collar Trach shield Tracheal Collar


 


O2 Flow Rate  10.0  10.0





 7/10/20 7/10/20 7/11/20 7/11/20





 22:30 23:00 00:00 00:00


 


Temp    99.3





    99.3


 


Pulse  107  104


 


Resp 25 24  24


 


B/P (MAP)  148/88 (108)  156/90 (112)


 


Pulse Ox 99 100  100


 


O2 Delivery Tracheal Collar Trach shield Trach Collar Trach shield


 


O2 Flow Rate 10.0  10.0 


 


    





    





 7/11/20 7/11/20 7/11/20 7/11/20





 01:00 02:00 03:00 04:00


 


Pulse 108 114 112 


 


Resp 24 24 29 


 


B/P (MAP) 163/98 (119) 161/95 (117) 160/94 (116) 


 


Pulse Ox 100 100 100 


 


O2 Delivery Trach shield Trach shield Trach shield Trach Collar


 


O2 Flow Rate    10.0





 7/11/20 7/11/20 7/11/20 7/11/20





 04:00 04:36 05:00 05:06


 


Temp 99.6   





 99.6   


 


Pulse 115  115 


 


Resp 34 32 34 33


 


B/P (MAP) 189/105 (133)  162/91 (114) 


 


Pulse Ox 100 98 100 98


 


O2 Delivery Trach shield Tracheal Collar Trach shield Tracheal Collar


 


O2 Flow Rate  10.0  10.0


 


    





    





 7/11/20 7/11/20  





 06:00 08:03  


 


Pulse 120 126  


 


Resp 31   


 


B/P (MAP) 145/93 (110) 163/87  


 


Pulse Ox 100   


 


O2 Delivery Trach shield   














Intake and Output   


 


 7/10/20 7/10/20 7/11/20





 15:00 23:00 07:00


 


Intake Total 150 ml 873 ml 1686 ml


 


Output Total 400 ml 1245 ml 1405 ml


 


Balance -250 ml -372 ml 281 ml











Justicifation of Admission Dx:


Justifications for Admission:


Justification of Admission Dx:  Yes











GAETANO GONCALVES MD        Jul 11, 2020 08:28

## 2020-07-11 NOTE — PDOC
SURGICAL PROGRESS NOTE


Subjective


Pt awake alert on trach shield


Vital Signs





Vital Signs








  Date Time  Temp Pulse Resp B/P (MAP) Pulse Ox O2 Delivery O2 Flow Rate FiO2


 


7/11/20 13:00  126 40 147/81 (103) 98 Trach shield  


 


7/11/20 12:51       6.0 


 


7/11/20 12:00 99.6       





 99.6       








I&O











Intake and Output 


 


 7/11/20





 07:00


 


Intake Total 2709 ml


 


Output Total 3210 ml


 


Balance -501 ml


 


 


 


IV Total 1649 ml


 


Tube Feeding 960 ml


 


Other 100 ml


 


Output Urine Total 1330 ml


 


Chest Tube Drainage Total 80 ml


 


Drainage Total 1800 ml








General:  Alert, No acute distress, Other (mildly diaphortic)


Abdomen:  Soft, No tenderness, Other


Labs





Laboratory Tests








Test


 7/9/20


23:04 7/10/20


05:30 7/10/20


05:50 7/10/20


06:07


 


Glucose (Fingerstick)


 138 mg/dL


(70-99) 


 


 103 mg/dL


(70-99)


 


Clostridium difficile Toxin


(PCR) 


 Negative


(NEGATIVE) 


 





 


White Blood Count


 


 


 15.4 x10^3/uL


(4.0-11.0) 





 


Red Blood Count


 


 


 2.86 x10^6/uL


(3.50-5.40) 





 


Hemoglobin


 


 


 8.4 g/dL


(12.0-15.5) 





 


Hematocrit


 


 


 25.4 %


(36.0-47.0) 





 


Mean Corpuscular Volume   89 fL ()  


 


Mean Corpuscular Hemoglobin   29 pg (25-35)  


 


Mean Corpuscular Hemoglobin


Concent 


 


 33 g/dL


(31-37) 





 


Red Cell Distribution Width


 


 


 15.0 %


(11.5-14.5) 





 


Platelet Count


 


 


 681 x10^3/uL


(140-400) 





 


Neutrophils (%) (Auto)   84 % (31-73)  


 


Lymphocytes (%) (Auto)   8 % (24-48)  


 


Monocytes (%) (Auto)   7 % (0-9)  


 


Eosinophils (%) (Auto)   1 % (0-3)  


 


Basophils (%) (Auto)   0 % (0-3)  


 


Neutrophils # (Auto)


 


 


 12.9 x10^3/uL


(1.8-7.7) 





 


Lymphocytes # (Auto)


 


 


 1.2 x10^3/uL


(1.0-4.8) 





 


Monocytes # (Auto)


 


 


 1.0 x10^3/uL


(0.0-1.1) 





 


Eosinophils # (Auto)


 


 


 0.2 x10^3/uL


(0.0-0.7) 





 


Basophils # (Auto)


 


 


 0.1 x10^3/uL


(0.0-0.2) 





 


Sodium Level


 


 


 143 mmol/L


(136-145) 





 


Potassium Level


 


 


 4.1 mmol/L


(3.5-5.1) 





 


Chloride Level


 


 


 107 mmol/L


() 





 


Carbon Dioxide Level


 


 


 32 mmol/L


(21-32) 





 


Anion Gap   4 (6-14)  


 


Blood Urea Nitrogen


 


 


 15 mg/dL


(7-20) 





 


Creatinine


 


 


 0.5 mg/dL


(0.6-1.0) 





 


Estimated GFR


(Cockcroft-Gault) 


 


 131.1 


 





 


Glucose Level


 


 


 151 mg/dL


(70-99) 





 


Calcium Level


 


 


 9.9 mg/dL


(8.5-10.1) 





 


Test


 7/10/20


18:00 7/11/20


00:38 7/11/20


06:10 7/11/20


06:14


 


Glucose (Fingerstick)


 138 mg/dL


(70-99) 147 mg/dL


(70-99) 


 159 mg/dL


(70-99)


 


White Blood Count


 


 


 16.0 x10^3/uL


(4.0-11.0) 





 


Red Blood Count


 


 


 2.99 x10^6/uL


(3.50-5.40) 





 


Hemoglobin


 


 


 8.8 g/dL


(12.0-15.5) 





 


Hematocrit


 


 


 26.6 %


(36.0-47.0) 





 


Mean Corpuscular Volume   89 fL ()  


 


Mean Corpuscular Hemoglobin   29 pg (25-35)  


 


Mean Corpuscular Hemoglobin


Concent 


 


 33 g/dL


(31-37) 





 


Red Cell Distribution Width


 


 


 14.8 %


(11.5-14.5) 





 


Platelet Count


 


 


 728 x10^3/uL


(140-400) 





 


Neutrophils (%) (Auto)   87 % (31-73)  


 


Lymphocytes (%) (Auto)   6 % (24-48)  


 


Monocytes (%) (Auto)   7 % (0-9)  


 


Eosinophils (%) (Auto)   1 % (0-3)  


 


Basophils (%) (Auto)   0 % (0-3)  


 


Neutrophils # (Auto)


 


 


 13.9 x10^3/uL


(1.8-7.7) 





 


Lymphocytes # (Auto)


 


 


 0.9 x10^3/uL


(1.0-4.8) 





 


Monocytes # (Auto)


 


 


 1.0 x10^3/uL


(0.0-1.1) 





 


Eosinophils # (Auto)


 


 


 0.1 x10^3/uL


(0.0-0.7) 





 


Basophils # (Auto)


 


 


 0.0 x10^3/uL


(0.0-0.2) 





 


Sodium Level


 


 


 142 mmol/L


(136-145) 





 


Potassium Level


 


 


 4.7 mmol/L


(3.5-5.1) 





 


Chloride Level


 


 


 107 mmol/L


() 





 


Carbon Dioxide Level


 


 


 31 mmol/L


(21-32) 





 


Anion Gap   4 (6-14)  


 


Blood Urea Nitrogen


 


 


 13 mg/dL


(7-20) 





 


Creatinine


 


 


 0.5 mg/dL


(0.6-1.0) 





 


Estimated GFR


(Cockcroft-Gault) 


 


 131.1 


 





 


Glucose Level


 


 


 169 mg/dL


(70-99) 





 


Calcium Level


 


 


 9.4 mg/dL


(8.5-10.1) 





 


Test


 7/11/20


11:17 


 


 





 


Glucose (Fingerstick)


 144 mg/dL


(70-99) 


 


 











Laboratory Tests








Test


 7/10/20


18:00 7/11/20


00:38 7/11/20


06:10 7/11/20


06:14


 


Glucose (Fingerstick)


 138 mg/dL


(70-99) 147 mg/dL


(70-99) 


 159 mg/dL


(70-99)


 


White Blood Count


 


 


 16.0 x10^3/uL


(4.0-11.0) 





 


Red Blood Count


 


 


 2.99 x10^6/uL


(3.50-5.40) 





 


Hemoglobin


 


 


 8.8 g/dL


(12.0-15.5) 





 


Hematocrit


 


 


 26.6 %


(36.0-47.0) 





 


Mean Corpuscular Volume   89 fL ()  


 


Mean Corpuscular Hemoglobin   29 pg (25-35)  


 


Mean Corpuscular Hemoglobin


Concent 


 


 33 g/dL


(31-37) 





 


Red Cell Distribution Width


 


 


 14.8 %


(11.5-14.5) 





 


Platelet Count


 


 


 728 x10^3/uL


(140-400) 





 


Neutrophils (%) (Auto)   87 % (31-73)  


 


Lymphocytes (%) (Auto)   6 % (24-48)  


 


Monocytes (%) (Auto)   7 % (0-9)  


 


Eosinophils (%) (Auto)   1 % (0-3)  


 


Basophils (%) (Auto)   0 % (0-3)  


 


Neutrophils # (Auto)


 


 


 13.9 x10^3/uL


(1.8-7.7) 





 


Lymphocytes # (Auto)


 


 


 0.9 x10^3/uL


(1.0-4.8) 





 


Monocytes # (Auto)


 


 


 1.0 x10^3/uL


(0.0-1.1) 





 


Eosinophils # (Auto)


 


 


 0.1 x10^3/uL


(0.0-0.7) 





 


Basophils # (Auto)


 


 


 0.0 x10^3/uL


(0.0-0.2) 





 


Sodium Level


 


 


 142 mmol/L


(136-145) 





 


Potassium Level


 


 


 4.7 mmol/L


(3.5-5.1) 





 


Chloride Level


 


 


 107 mmol/L


() 





 


Carbon Dioxide Level


 


 


 31 mmol/L


(21-32) 





 


Anion Gap   4 (6-14)  


 


Blood Urea Nitrogen


 


 


 13 mg/dL


(7-20) 





 


Creatinine


 


 


 0.5 mg/dL


(0.6-1.0) 





 


Estimated GFR


(Cockcroft-Gault) 


 


 131.1 


 





 


Glucose Level


 


 


 169 mg/dL


(70-99) 





 


Calcium Level


 


 


 9.4 mg/dL


(8.5-10.1) 





 


Test


 7/11/20


11:17 


 


 





 


Glucose (Fingerstick)


 144 mg/dL


(70-99) 


 


 











Problem List


Problems


Medical Problems:


(1) Acute pancreatitis


Status: Acute  





(2) Cholelithiasis


Status: Acute  








Assessment/Plan


s/p necrosectomy


cont drains and supportive care


maintain slow tube feeds and G-tube to dependent drainage.





Justicifation of Admission Dx:


Justifications for Admission:


Justification of Admission Dx:  Yes











GAMAL ZHOU MD             Jul 11, 2020 13:52

## 2020-07-11 NOTE — PDOC
Infectious Disease Note


Subjective


Subjective


More alert.  Seems a little confused


Occ nausea and pain


Off vent


TPN


off vasopressin, off levophed





Vital Sign


Vital Signs





Vital Signs








  Date Time  Temp Pulse Resp B/P (MAP) Pulse Ox O2 Delivery O2 Flow Rate FiO2


 


7/11/20 08:03  126  163/87    


 


7/11/20 06:00   31  100 Trach shield  


 


7/11/20 05:06       10.0 


 


7/11/20 04:00 99.6       





 99.6       











Physical Exam


PHYSICAL EXAM


GENERAL: Alert, NAD tired appearing but some better


HEENT: Pupils equal, oral cavity dry. + NGT


NECK:  Tracheostomy 


LUNGS: Diminished aeration bases,  CT on left 


HEART:  S1, S2, regular w/ PVCs 


ABDOMEN: Sightly less distended, bowel sounds hypoactive, soft, richardson x 2, 3 ROBERT

drains, G-J tube and + wound vac 


: [place] in place 


EXTREMITIES: Generalized edema, no cyanosis. SCDs & Podus boots bilaterally  


SKIN: warm touch. No signs of rash.  


LUE-PICC without signs of complications 


LUE art-line out, mottling left forearm about ole art-line site is improving. RP

palpable, cap refill brisk.


NEURO: alert and some responsive -  tracking





Labs


Lab





Laboratory Tests








Test


 7/10/20


18:00 7/11/20


00:38 7/11/20


06:10


 


Glucose (Fingerstick)


 138 mg/dL


(70-99) 147 mg/dL


(70-99) 





 


White Blood Count


 


 


 16.0 x10^3/uL


(4.0-11.0)


 


Red Blood Count


 


 


 2.99 x10^6/uL


(3.50-5.40)


 


Hemoglobin


 


 


 8.8 g/dL


(12.0-15.5)


 


Hematocrit


 


 


 26.6 %


(36.0-47.0)


 


Mean Corpuscular Volume   89 fL () 


 


Mean Corpuscular Hemoglobin   29 pg (25-35) 


 


Mean Corpuscular Hemoglobin


Concent 


 


 33 g/dL


(31-37)


 


Red Cell Distribution Width


 


 


 14.8 %


(11.5-14.5)


 


Platelet Count


 


 


 728 x10^3/uL


(140-400)


 


Neutrophils (%) (Auto)   87 % (31-73) 


 


Lymphocytes (%) (Auto)   6 % (24-48) 


 


Monocytes (%) (Auto)   7 % (0-9) 


 


Eosinophils (%) (Auto)   1 % (0-3) 


 


Basophils (%) (Auto)   0 % (0-3) 


 


Neutrophils # (Auto)


 


 


 13.9 x10^3/uL


(1.8-7.7)


 


Lymphocytes # (Auto)


 


 


 0.9 x10^3/uL


(1.0-4.8)


 


Monocytes # (Auto)


 


 


 1.0 x10^3/uL


(0.0-1.1)


 


Eosinophils # (Auto)


 


 


 0.1 x10^3/uL


(0.0-0.7)


 


Basophils # (Auto)


 


 


 0.0 x10^3/uL


(0.0-0.2)


 


Sodium Level


 


 


 142 mmol/L


(136-145)


 


Potassium Level


 


 


 4.7 mmol/L


(3.5-5.1)


 


Chloride Level


 


 


 107 mmol/L


()


 


Carbon Dioxide Level


 


 


 31 mmol/L


(21-32)


 


Anion Gap   4 (6-14) 


 


Blood Urea Nitrogen


 


 


 13 mg/dL


(7-20)


 


Creatinine


 


 


 0.5 mg/dL


(0.6-1.0)


 


Estimated GFR


(Cockcroft-Gault) 


 


 131.1 





 


Glucose Level


 


 


 169 mg/dL


(70-99)


 


Calcium Level


 


 


 9.4 mg/dL


(8.5-10.1)








Micro


6/7 





GRAM STAIN  Final  


        Final





        GRAM NEGATIVE RODS:MODERATE


        SQUAMOUS EPI CELL:NOT APPLICABLE


        PMN (WBCs):RARE


        YEAST:MODERATE


        Unless otherwise specified, Testing Performed by:


        [address] [address]


        For Inquires, the Physician may contact the Microbiology


        department at [phone]





  ANAEROBIC-AEROBIC CULTURE  Preliminary  


        Preliminary





        MANY GRAM NEGATIVE RODS on 06/09/20 at 1158


        FINAL ID= [PSEUDOMONAS AERUGINOSA]


        PSEUDOMONAS AERUGINOSA





  ANTIMICROBIAL SUSCEPTIBILITY  Preliminary  


        Comment





        NEG ANSON 56


        PSEUDOMONAS AERUGINOSA


        ANTIBIOTIC                        RESULT          INTERPRETATION


        AMIKACIN                          <=16                  S


        AZTREONAM                         >16                   R


        CEFTAZIDIME                       >16                   R


        CIPROFLOXACIN                     <=0.25                S


        CEFEPIME                          16                    I


        CEFTAZIDIME/AVIBACTAM             <=4                   S


        GENTAMICIN                        <=2                   S














                               ** CONTINUED ON NEXT PAGE **





------------------------------------------------------------------------

--------------------





RUN DATE: 06/11/20                  [facility] *LIVE*               

  PAGE 2   


RUN TIME: 1016                            Specimen Inquiry                    


--------------------------------------------------------------

------------------------------





SPEC: [id number]    PATIENT: JESENIA BEAN                [id number]  

(Continued)


-------

--------------------------------------------------------------------------------


-----








----------------------------------------

----------------------------------------------------





  Procedure                         Result                                      

         


 

--------------------------------------------------------------------------------


------------





  ANTIMICROBIAL SUSCEPTIBILITY  Preliminary   (continued)


        LEVOFLOXACIN                      <=0.5                 S


        MEROPENEM                         <=1                   S


        PIPERACILLIN/TAZOBACTAM           64                    S


        TOBRAMYCIN                        <=2                   S


        Unless otherwise specified, Testing Performed by:


        [facility]


        [address] [place] [address]


        For Inquires, the Physician may contact the Microbiology


        department at [phone]











CT Scan 6/6





IMPRESSION:


1. Removal of the percutaneous pigtail drainage catheters since the prior 


exam. Sequela of pancreatitis with extensive pseudocysts again 


demonstrated, the right-sided collections are slightly larger since the 


prior exam, the left-sided collections are stable. See above.


2. Moderate to large left pleural effusion with atelectasis and collapse 


of most of the left lower lobe, stable. Small right pleural effusion is 


stable.


3. Gallstone.





Objective


Assessment


Patient with prolonged hospitalization more than 3 months


Multiple medical problems


Multiple surgical procedures





Loose stool - occ stooling expected as part of normal bowel function but c-diff 

sent


S/P Exp. Lap, REN, subtotal cholecystectomy with cholangiogram, G-J tube 

placement & pancreatic necrosectomy on June 30 YEAST parapsilosis/PSA 


Leukocytosis - mild increase today but all parameters are up


Fever intermittently source likely GI


Severe protein malnutrition


Acute gallstone pancreatitis with persistent necrosis


  -CT a/p 4/9.  Increased ascites. Persistent evidence of necrotizing 

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


           - 4/27. status post ROBERT drain placement; C. parapsilosis. s/p drain 

5/6 + yeast & high amylase; s/p additional drain on 5/8. Drains removed. 


           -5/6. fluid  candida parapsilosis fluid, amylase high


           - 6/6 showed multiple pseudocysts, slight larger on the right. s/p 

drains x 3, 6/7.  + PSAE (MDRO-R Cefepime, Zosyn ANSON < 64) and yeast, 


           -6/7 s/p drain replacement x 3; fluid cult PSAE (MDRO), yeast; treate

d


Ascites s/p paracentesis 4/15 & 5/6. C. parapsilosis 


Cholelithiasis with thickening of the gallbladder wall.


JED, Hyperkalemia, Metabolic acidosis off dialysis


Acute hypoxic resp failure. trach/vent. sputum 6/13  + PSAE (I merrem) 


Pleural effusions s/p left thoracentesis, 5/12. no culture. s/p left chest tube,

6/15 no growth


Hypocalcemia 


Prediabetes


HTN


Anemia s/p RBCs





Plan


Plan of Care





F/u C-diff - neg


PSA from 6/30 I to Meropenem but WBC improving so will try to hold changing abx 

to Cipro and or Avycaz to try and save potential abx options


Monitor Abd distension ? mild improvement today ? with BM


continue merrem and micafungin but


d/c'd Dapto 7/7


Monitor labs in am - if WBC trends may need CT scan


wound care /drain management as directed











Contact isolation for CRE/MDRO


Critically ill





D/w nursing











RASHAWN ROSEN MD              Jul 11, 2020 08:44

## 2020-07-11 NOTE — NUR
Patient coughing up what appeared to gastric content through her trach. It was yellow in 
color/same color that is in her G tube bag. G tube put out 450ml at this time from the start 
of shift. TF turned down to 10ml/hr. Dr Flores aware and ok with TF at 10ml/hr.

## 2020-07-11 NOTE — PDOC
PULMONARY PROGRESS NOTES


Subjective


trach, on tm, no sob  is weak


Vitals





Vital Signs








  Date Time  Temp Pulse Resp B/P (MAP) Pulse Ox O2 Delivery O2 Flow Rate FiO2


 


7/11/20 10:29   23  98 Tracheal Collar 6.0 


 


7/11/20 10:00  118  156/96 (116)    


 


7/11/20 08:00 99.0       





 99.0       








General:  Alert


HEENT:  Other (trach site ok)


Lungs:  Crackles


Cardiovascular:  S1, S2


Abdomen:  Soft, Non-tender, Other (multiple ROBERT drains )


Neuro Exam:  Alert


Extremities:  Other (+1 BLE edema)


Skin:  Warm


Labs





Laboratory Tests








Test


 7/9/20


12:48 7/9/20


23:04 7/10/20


05:30 7/10/20


05:50


 


Glucose (Fingerstick)


 131 mg/dL


(70-99) 138 mg/dL


(70-99) 


 





 


Clostridium difficile Toxin


(PCR) 


 


 Negative


(NEGATIVE) 





 


White Blood Count


 


 


 


 15.4 x10^3/uL


(4.0-11.0)


 


Red Blood Count


 


 


 


 2.86 x10^6/uL


(3.50-5.40)


 


Hemoglobin


 


 


 


 8.4 g/dL


(12.0-15.5)


 


Hematocrit


 


 


 


 25.4 %


(36.0-47.0)


 


Mean Corpuscular Volume    89 fL () 


 


Mean Corpuscular Hemoglobin    29 pg (25-35) 


 


Mean Corpuscular Hemoglobin


Concent 


 


 


 33 g/dL


(31-37)


 


Red Cell Distribution Width


 


 


 


 15.0 %


(11.5-14.5)


 


Platelet Count


 


 


 


 681 x10^3/uL


(140-400)


 


Neutrophils (%) (Auto)    84 % (31-73) 


 


Lymphocytes (%) (Auto)    8 % (24-48) 


 


Monocytes (%) (Auto)    7 % (0-9) 


 


Eosinophils (%) (Auto)    1 % (0-3) 


 


Basophils (%) (Auto)    0 % (0-3) 


 


Neutrophils # (Auto)


 


 


 


 12.9 x10^3/uL


(1.8-7.7)


 


Lymphocytes # (Auto)


 


 


 


 1.2 x10^3/uL


(1.0-4.8)


 


Monocytes # (Auto)


 


 


 


 1.0 x10^3/uL


(0.0-1.1)


 


Eosinophils # (Auto)


 


 


 


 0.2 x10^3/uL


(0.0-0.7)


 


Basophils # (Auto)


 


 


 


 0.1 x10^3/uL


(0.0-0.2)


 


Sodium Level


 


 


 


 143 mmol/L


(136-145)


 


Potassium Level


 


 


 


 4.1 mmol/L


(3.5-5.1)


 


Chloride Level


 


 


 


 107 mmol/L


()


 


Carbon Dioxide Level


 


 


 


 32 mmol/L


(21-32)


 


Anion Gap    4 (6-14) 


 


Blood Urea Nitrogen


 


 


 


 15 mg/dL


(7-20)


 


Creatinine


 


 


 


 0.5 mg/dL


(0.6-1.0)


 


Estimated GFR


(Cockcroft-Gault) 


 


 


 131.1 





 


Glucose Level


 


 


 


 151 mg/dL


(70-99)


 


Calcium Level


 


 


 


 9.9 mg/dL


(8.5-10.1)


 


Test


 7/10/20


06:07 7/10/20


18:00 7/11/20


00:38 7/11/20


06:10


 


Glucose (Fingerstick)


 103 mg/dL


(70-99) 138 mg/dL


(70-99) 147 mg/dL


(70-99) 





 


White Blood Count


 


 


 


 16.0 x10^3/uL


(4.0-11.0)


 


Red Blood Count


 


 


 


 2.99 x10^6/uL


(3.50-5.40)


 


Hemoglobin


 


 


 


 8.8 g/dL


(12.0-15.5)


 


Hematocrit


 


 


 


 26.6 %


(36.0-47.0)


 


Mean Corpuscular Volume    89 fL () 


 


Mean Corpuscular Hemoglobin    29 pg (25-35) 


 


Mean Corpuscular Hemoglobin


Concent 


 


 


 33 g/dL


(31-37)


 


Red Cell Distribution Width


 


 


 


 14.8 %


(11.5-14.5)


 


Platelet Count


 


 


 


 728 x10^3/uL


(140-400)


 


Neutrophils (%) (Auto)    87 % (31-73) 


 


Lymphocytes (%) (Auto)    6 % (24-48) 


 


Monocytes (%) (Auto)    7 % (0-9) 


 


Eosinophils (%) (Auto)    1 % (0-3) 


 


Basophils (%) (Auto)    0 % (0-3) 


 


Neutrophils # (Auto)


 


 


 


 13.9 x10^3/uL


(1.8-7.7)


 


Lymphocytes # (Auto)


 


 


 


 0.9 x10^3/uL


(1.0-4.8)


 


Monocytes # (Auto)


 


 


 


 1.0 x10^3/uL


(0.0-1.1)


 


Eosinophils # (Auto)


 


 


 


 0.1 x10^3/uL


(0.0-0.7)


 


Basophils # (Auto)


 


 


 


 0.0 x10^3/uL


(0.0-0.2)


 


Sodium Level


 


 


 


 142 mmol/L


(136-145)


 


Potassium Level


 


 


 


 4.7 mmol/L


(3.5-5.1)


 


Chloride Level


 


 


 


 107 mmol/L


()


 


Carbon Dioxide Level


 


 


 


 31 mmol/L


(21-32)


 


Anion Gap    4 (6-14) 


 


Blood Urea Nitrogen


 


 


 


 13 mg/dL


(7-20)


 


Creatinine


 


 


 


 0.5 mg/dL


(0.6-1.0)


 


Estimated GFR


(Cockcroft-Gault) 


 


 


 131.1 





 


Glucose Level


 


 


 


 169 mg/dL


(70-99)


 


Calcium Level


 


 


 


 9.4 mg/dL


(8.5-10.1)


 


Test


 7/11/20


06:14 7/11/20


11:17 


 





 


Glucose (Fingerstick)


 159 mg/dL


(70-99) 144 mg/dL


(70-99) 


 











Laboratory Tests








Test


 7/10/20


18:00 7/11/20


00:38 7/11/20


06:10 7/11/20


06:14


 


Glucose (Fingerstick)


 138 mg/dL


(70-99) 147 mg/dL


(70-99) 


 159 mg/dL


(70-99)


 


White Blood Count


 


 


 16.0 x10^3/uL


(4.0-11.0) 





 


Red Blood Count


 


 


 2.99 x10^6/uL


(3.50-5.40) 





 


Hemoglobin


 


 


 8.8 g/dL


(12.0-15.5) 





 


Hematocrit


 


 


 26.6 %


(36.0-47.0) 





 


Mean Corpuscular Volume   89 fL ()  


 


Mean Corpuscular Hemoglobin   29 pg (25-35)  


 


Mean Corpuscular Hemoglobin


Concent 


 


 33 g/dL


(31-37) 





 


Red Cell Distribution Width


 


 


 14.8 %


(11.5-14.5) 





 


Platelet Count


 


 


 728 x10^3/uL


(140-400) 





 


Neutrophils (%) (Auto)   87 % (31-73)  


 


Lymphocytes (%) (Auto)   6 % (24-48)  


 


Monocytes (%) (Auto)   7 % (0-9)  


 


Eosinophils (%) (Auto)   1 % (0-3)  


 


Basophils (%) (Auto)   0 % (0-3)  


 


Neutrophils # (Auto)


 


 


 13.9 x10^3/uL


(1.8-7.7) 





 


Lymphocytes # (Auto)


 


 


 0.9 x10^3/uL


(1.0-4.8) 





 


Monocytes # (Auto)


 


 


 1.0 x10^3/uL


(0.0-1.1) 





 


Eosinophils # (Auto)


 


 


 0.1 x10^3/uL


(0.0-0.7) 





 


Basophils # (Auto)


 


 


 0.0 x10^3/uL


(0.0-0.2) 





 


Sodium Level


 


 


 142 mmol/L


(136-145) 





 


Potassium Level


 


 


 4.7 mmol/L


(3.5-5.1) 





 


Chloride Level


 


 


 107 mmol/L


() 





 


Carbon Dioxide Level


 


 


 31 mmol/L


(21-32) 





 


Anion Gap   4 (6-14)  


 


Blood Urea Nitrogen


 


 


 13 mg/dL


(7-20) 





 


Creatinine


 


 


 0.5 mg/dL


(0.6-1.0) 





 


Estimated GFR


(Cockcroft-Gault) 


 


 131.1 


 





 


Glucose Level


 


 


 169 mg/dL


(70-99) 





 


Calcium Level


 


 


 9.4 mg/dL


(8.5-10.1) 





 


Test


 7/11/20


11:17 


 


 





 


Glucose (Fingerstick)


 144 mg/dL


(70-99) 


 


 











Medications





Active Scripts








 Medications  Dose


 Route/Sig


 Max Daily Dose Days Date Category


 


 Bisoprolol


 Fumarate 5 Mg


 Tablet  10 Mg


 PO DAILY


   3/16/20 Reported








Comments


 CXR reviewed


IMPRESSION:


No significant interval change compared to 6/25/2020.


 











ct abdomen /pelvis 6/6


1. Removal of the percutaneous pigtail drainage catheters since the prior 


exam. Sequela of pancreatitis with extensive pseudocysts again 


demonstrated, the right-sided collections are slightly larger since the 


prior exam, the left-sided collections are stable. See above.


2. Moderate to large left pleural effusion with atelectasis and collapse 


of most of the left lower lobe, stable. Small right pleural effusion is 


stable.


3. Gallstone.





ct chest 6/15 reviewed








 GRAM NEG COCCOBACILLI:MANY


        SQUAMOUS EPI CELL:RARE


        PMN (WBCs):[id number]


        Unless otherwise specified, Testing Performed by:


        [address] [address]


        For Inquires, the Physician may contact the Microbiology


        department at [phone]





  RESPIRATORY CULTURE  Final  


        Final





        MANY GRAM NEGATIVE RODS on 06/15/20 at 1107


        FINAL ID= [PSEUDOMONAS AERUGINOSA]


        MICRO CHARGES


        PSEUDOMONAS AERUGINOSA





  ANTIMICROBIAL SUSCEPTIBILITY  Final  


        Comment





        NEG ANSON 56


        PSEUDOMONAS AERUGINOSA


        ANTIBIOTIC                        RESULT          INTERPRETATION


        AMIKACIN                          <=16                  S


        AZTREONAM                         <=4                   S


        CEFTAZIDIME                       <=1                   S


        CIPROFLOXACIN                     <=0.25                S


        CEFEPIME                          <=2                   S


        CEFTAZIDIME/AVIBACTAM             <=4                   S


        GENTAMICIN                        <=2                   S


        LEVOFLOXACIN                      <=0.5                 S





Impression


.





IMPRESSION:


1.  Acute hypoxemic respiratory failure secondary to ARDS status post trach, 

developed anemia 6/7, blood drainage from RLQ abdomen drain site, and 

surrounding firmness  / developed septic shock 6/7 from abdomen source, required

levo 6/7


s/p 3 new drains 6/7 with brown color drainage,  


2.  Gallstone pancreatitis, now with ongoing bleeding from prior drain. Anemic. 

s/p Tx multiple units over several days


3.  septic shock/sepsis, recurrent 6/7, source abdomen. ,


4.  Acute kidney injury-, Off HD--renal function decling. suspect JED on CKD due

to hypotension , improved now


5.  Acute gallstone pancreatitis.


6.  Hypoalbuminemia.


7.  Moderate persistent effusions, s/p left thora  5/12, reaccumulation of left 

effusion. O2 requirement not changed. 


8.  Fever- ,hypotension. suspect recurrent sepsis/ likely pancreatic source.  

Per ID, per surgery--


9.  Chronic anemia-- ongoing / s/p PRBC


10. Covid 19 testing negative


11. Moderate to large ascites-S/P paracentisis


12.S/P paracentisis with 4 liters removed on 4/15/20


13. S/P IR drain placement on 5/8/2020, removal, re inserted 6/7


14. Depression/Anxiety 


15. Increase effusion, ? loculated/ s/p chest tube.. drainage slowing down. 





6/30


S/P Exploratory laparotomy, lysis of adhesions, subtotal cholecystectomy with 

cholangiogram, gastrojejunostomy tube placement, pancreatic necrosectomy


leukocytosis- improving





Plan


.


cont trach, 02 titration 


Up to chair


PT OT


Follow surgery input


DVT GI prophylaxis


ABX per ID 


Continue TF and TPN for nutrition support 


DVT/GI PPX


D/W RN and RT, pts daughter











MAN BLAKE MD               Jul 11, 2020 11:53

## 2020-07-12 VITALS — DIASTOLIC BLOOD PRESSURE: 90 MMHG | SYSTOLIC BLOOD PRESSURE: 151 MMHG

## 2020-07-12 VITALS — SYSTOLIC BLOOD PRESSURE: 113 MMHG | DIASTOLIC BLOOD PRESSURE: 91 MMHG

## 2020-07-12 VITALS — DIASTOLIC BLOOD PRESSURE: 89 MMHG | SYSTOLIC BLOOD PRESSURE: 149 MMHG

## 2020-07-12 VITALS — SYSTOLIC BLOOD PRESSURE: 129 MMHG | DIASTOLIC BLOOD PRESSURE: 76 MMHG

## 2020-07-12 VITALS — SYSTOLIC BLOOD PRESSURE: 117 MMHG | DIASTOLIC BLOOD PRESSURE: 66 MMHG

## 2020-07-12 VITALS — DIASTOLIC BLOOD PRESSURE: 89 MMHG | SYSTOLIC BLOOD PRESSURE: 155 MMHG

## 2020-07-12 VITALS — SYSTOLIC BLOOD PRESSURE: 156 MMHG | DIASTOLIC BLOOD PRESSURE: 93 MMHG

## 2020-07-12 VITALS — SYSTOLIC BLOOD PRESSURE: 152 MMHG | DIASTOLIC BLOOD PRESSURE: 83 MMHG

## 2020-07-12 VITALS — DIASTOLIC BLOOD PRESSURE: 92 MMHG | SYSTOLIC BLOOD PRESSURE: 167 MMHG

## 2020-07-12 VITALS — SYSTOLIC BLOOD PRESSURE: 146 MMHG | DIASTOLIC BLOOD PRESSURE: 87 MMHG

## 2020-07-12 VITALS — SYSTOLIC BLOOD PRESSURE: 138 MMHG | DIASTOLIC BLOOD PRESSURE: 76 MMHG

## 2020-07-12 VITALS — DIASTOLIC BLOOD PRESSURE: 71 MMHG | SYSTOLIC BLOOD PRESSURE: 116 MMHG

## 2020-07-12 VITALS — SYSTOLIC BLOOD PRESSURE: 142 MMHG | DIASTOLIC BLOOD PRESSURE: 90 MMHG

## 2020-07-12 VITALS — DIASTOLIC BLOOD PRESSURE: 83 MMHG | SYSTOLIC BLOOD PRESSURE: 152 MMHG

## 2020-07-12 VITALS — DIASTOLIC BLOOD PRESSURE: 90 MMHG | SYSTOLIC BLOOD PRESSURE: 162 MMHG

## 2020-07-12 VITALS — SYSTOLIC BLOOD PRESSURE: 152 MMHG | DIASTOLIC BLOOD PRESSURE: 84 MMHG

## 2020-07-12 VITALS — DIASTOLIC BLOOD PRESSURE: 91 MMHG | SYSTOLIC BLOOD PRESSURE: 150 MMHG

## 2020-07-12 VITALS — SYSTOLIC BLOOD PRESSURE: 153 MMHG | DIASTOLIC BLOOD PRESSURE: 91 MMHG

## 2020-07-12 VITALS — SYSTOLIC BLOOD PRESSURE: 119 MMHG | DIASTOLIC BLOOD PRESSURE: 73 MMHG

## 2020-07-12 VITALS — DIASTOLIC BLOOD PRESSURE: 82 MMHG | SYSTOLIC BLOOD PRESSURE: 151 MMHG

## 2020-07-12 VITALS — DIASTOLIC BLOOD PRESSURE: 80 MMHG | SYSTOLIC BLOOD PRESSURE: 149 MMHG

## 2020-07-12 VITALS — SYSTOLIC BLOOD PRESSURE: 133 MMHG | DIASTOLIC BLOOD PRESSURE: 78 MMHG

## 2020-07-12 VITALS — DIASTOLIC BLOOD PRESSURE: 86 MMHG | SYSTOLIC BLOOD PRESSURE: 132 MMHG

## 2020-07-12 VITALS — SYSTOLIC BLOOD PRESSURE: 144 MMHG | DIASTOLIC BLOOD PRESSURE: 81 MMHG

## 2020-07-12 LAB
ALBUMIN SERPL-MCNC: 1.5 G/DL (ref 3.4–5)
ALBUMIN/GLOB SERPL: 0.5 {RATIO} (ref 1–1.7)
ALP SERPL-CCNC: 156 U/L (ref 46–116)
ALT SERPL-CCNC: 35 U/L (ref 14–59)
ANION GAP SERPL CALC-SCNC: 6 MMOL/L (ref 6–14)
AST SERPL-CCNC: 23 U/L (ref 15–37)
BASOPHILS # BLD AUTO: 0 X10^3/UL (ref 0–0.2)
BASOPHILS NFR BLD: 0 % (ref 0–3)
BILIRUB SERPL-MCNC: 0.5 MG/DL (ref 0.2–1)
BUN SERPL-MCNC: 11 MG/DL (ref 7–20)
BUN/CREAT SERPL: 22 (ref 6–20)
CALCIUM SERPL-MCNC: 9.3 MG/DL (ref 8.5–10.1)
CHLORIDE SERPL-SCNC: 103 MMOL/L (ref 98–107)
CO2 SERPL-SCNC: 30 MMOL/L (ref 21–32)
CREAT SERPL-MCNC: 0.5 MG/DL (ref 0.6–1)
EOSINOPHIL NFR BLD: 0.1 X10^3/UL (ref 0–0.7)
EOSINOPHIL NFR BLD: 1 % (ref 0–3)
ERYTHROCYTE [DISTWIDTH] IN BLOOD BY AUTOMATED COUNT: 15 % (ref 11.5–14.5)
GFR SERPLBLD BASED ON 1.73 SQ M-ARVRAT: 131.1 ML/MIN
GLOBULIN SER-MCNC: 3.3 G/DL (ref 2.2–3.8)
GLUCOSE SERPL-MCNC: 122 MG/DL (ref 70–99)
HCT VFR BLD CALC: 26.3 % (ref 36–47)
HGB BLD-MCNC: 8.8 G/DL (ref 12–15.5)
LYMPHOCYTES # BLD: 1.9 X10^3/UL (ref 1–4.8)
LYMPHOCYTES NFR BLD AUTO: 10 % (ref 24–48)
MCH RBC QN AUTO: 29 PG (ref 25–35)
MCHC RBC AUTO-ENTMCNC: 33 G/DL (ref 31–37)
MCV RBC AUTO: 88 FL (ref 79–100)
MONO #: 1.3 X10^3/UL (ref 0–1.1)
MONOCYTES NFR BLD: 7 % (ref 0–9)
NEUT #: 15.1 X10^3/UL (ref 1.8–7.7)
NEUTROPHILS NFR BLD AUTO: 82 % (ref 31–73)
PLATELET # BLD AUTO: 693 X10^3/UL (ref 140–400)
POTASSIUM SERPL-SCNC: 4.6 MMOL/L (ref 3.5–5.1)
PROT SERPL-MCNC: 4.8 G/DL (ref 6.4–8.2)
RBC # BLD AUTO: 2.99 X10^6/UL (ref 3.5–5.4)
SODIUM SERPL-SCNC: 139 MMOL/L (ref 136–145)
WBC # BLD AUTO: 18.5 X10^3/UL (ref 4–11)

## 2020-07-12 RX ADMIN — IPRATROPIUM BROMIDE AND ALBUTEROL SULFATE SCH ML: .5; 3 SOLUTION RESPIRATORY (INHALATION) at 04:00

## 2020-07-12 RX ADMIN — INSULIN LISPRO SCH UNITS: 100 INJECTION, SOLUTION INTRAVENOUS; SUBCUTANEOUS at 12:43

## 2020-07-12 RX ADMIN — MEROPENEM SCH MLS/HR: 500 INJECTION, POWDER, FOR SOLUTION INTRAVENOUS at 17:59

## 2020-07-12 RX ADMIN — DEXTROSE SCH MLS/HR: 50 INJECTION, SOLUTION INTRAVENOUS at 08:19

## 2020-07-12 RX ADMIN — CIPROFLOXACIN SCH MLS/HR: 2 INJECTION, SOLUTION INTRAVENOUS at 21:23

## 2020-07-12 RX ADMIN — FENTANYL CITRATE PRN MCG: 50 INJECTION INTRAMUSCULAR; INTRAVENOUS at 08:22

## 2020-07-12 RX ADMIN — CIPROFLOXACIN SCH MLS/HR: 2 INJECTION, SOLUTION INTRAVENOUS at 10:34

## 2020-07-12 RX ADMIN — FENTANYL CITRATE PRN MCG: 50 INJECTION INTRAMUSCULAR; INTRAVENOUS at 02:46

## 2020-07-12 RX ADMIN — ONDANSETRON PRN MG: 2 INJECTION INTRAMUSCULAR; INTRAVENOUS at 14:27

## 2020-07-12 RX ADMIN — MEROPENEM SCH MLS/HR: 500 INJECTION, POWDER, FOR SOLUTION INTRAVENOUS at 12:40

## 2020-07-12 RX ADMIN — ACETAMINOPHEN PRN MG: 650 SUPPOSITORY RECTAL at 04:42

## 2020-07-12 RX ADMIN — INSULIN LISPRO SCH UNITS: 100 INJECTION, SOLUTION INTRAVENOUS; SUBCUTANEOUS at 06:00

## 2020-07-12 RX ADMIN — Medication PRN EACH: at 09:52

## 2020-07-12 RX ADMIN — MEROPENEM SCH MLS/HR: 500 INJECTION, POWDER, FOR SOLUTION INTRAVENOUS at 07:29

## 2020-07-12 RX ADMIN — IPRATROPIUM BROMIDE AND ALBUTEROL SULFATE SCH ML: .5; 3 SOLUTION RESPIRATORY (INHALATION) at 23:14

## 2020-07-12 RX ADMIN — ONDANSETRON PRN MG: 2 INJECTION INTRAMUSCULAR; INTRAVENOUS at 08:22

## 2020-07-12 RX ADMIN — ENOXAPARIN SODIUM SCH MG: 40 INJECTION SUBCUTANEOUS at 08:20

## 2020-07-12 RX ADMIN — IPRATROPIUM BROMIDE AND ALBUTEROL SULFATE SCH ML: .5; 3 SOLUTION RESPIRATORY (INHALATION) at 20:17

## 2020-07-12 RX ADMIN — VANCOMYCIN HYDROCHLORIDE PRN EACH: 1 INJECTION, POWDER, LYOPHILIZED, FOR SOLUTION INTRAVENOUS at 16:07

## 2020-07-12 RX ADMIN — INSULIN LISPRO SCH UNITS: 100 INJECTION, SOLUTION INTRAVENOUS; SUBCUTANEOUS at 00:00

## 2020-07-12 RX ADMIN — METOPROLOL TARTRATE PRN MG: 5 INJECTION INTRAVENOUS at 18:01

## 2020-07-12 RX ADMIN — ONDANSETRON PRN MG: 2 INJECTION INTRAMUSCULAR; INTRAVENOUS at 19:55

## 2020-07-12 RX ADMIN — ACETYLCYSTEINE SCH MG: 200 INHALANT RESPIRATORY (INHALATION) at 08:00

## 2020-07-12 RX ADMIN — METOPROLOL TARTRATE PRN MG: 5 INJECTION INTRAVENOUS at 08:21

## 2020-07-12 RX ADMIN — BACITRACIN SCH MLS/HR: 5000 INJECTION, POWDER, FOR SOLUTION INTRAMUSCULAR at 14:27

## 2020-07-12 RX ADMIN — PANTOPRAZOLE SODIUM SCH MG: 40 INJECTION, POWDER, FOR SOLUTION INTRAVENOUS at 08:18

## 2020-07-12 RX ADMIN — ACETAMINOPHEN PRN MG: 650 SUPPOSITORY RECTAL at 17:59

## 2020-07-12 RX ADMIN — VANCOMYCIN HYDROCHLORIDE PRN EACH: 1 INJECTION, POWDER, LYOPHILIZED, FOR SOLUTION INTRAVENOUS at 09:34

## 2020-07-12 RX ADMIN — IPRATROPIUM BROMIDE AND ALBUTEROL SULFATE SCH ML: .5; 3 SOLUTION RESPIRATORY (INHALATION) at 00:11

## 2020-07-12 RX ADMIN — FENTANYL CITRATE PRN MCG: 50 INJECTION INTRAMUSCULAR; INTRAVENOUS at 04:41

## 2020-07-12 RX ADMIN — IPRATROPIUM BROMIDE AND ALBUTEROL SULFATE SCH ML: .5; 3 SOLUTION RESPIRATORY (INHALATION) at 15:33

## 2020-07-12 RX ADMIN — FENTANYL CITRATE PRN MCG: 50 INJECTION INTRAMUSCULAR; INTRAVENOUS at 12:42

## 2020-07-12 RX ADMIN — MEROPENEM SCH MLS/HR: 500 INJECTION, POWDER, FOR SOLUTION INTRAVENOUS at 00:27

## 2020-07-12 RX ADMIN — FENTANYL CITRATE PRN MCG: 50 INJECTION INTRAMUSCULAR; INTRAVENOUS at 18:02

## 2020-07-12 RX ADMIN — ACETYLCYSTEINE SCH MG: 200 INHALANT RESPIRATORY (INHALATION) at 20:18

## 2020-07-12 RX ADMIN — PROCHLORPERAZINE EDISYLATE PRN MG: 5 INJECTION INTRAMUSCULAR; INTRAVENOUS at 15:48

## 2020-07-12 RX ADMIN — IPRATROPIUM BROMIDE AND ALBUTEROL SULFATE SCH ML: .5; 3 SOLUTION RESPIRATORY (INHALATION) at 12:00

## 2020-07-12 RX ADMIN — IPRATROPIUM BROMIDE AND ALBUTEROL SULFATE SCH ML: .5; 3 SOLUTION RESPIRATORY (INHALATION) at 08:52

## 2020-07-12 RX ADMIN — INSULIN LISPRO SCH UNITS: 100 INJECTION, SOLUTION INTRAVENOUS; SUBCUTANEOUS at 18:00

## 2020-07-12 RX ADMIN — VANCOMYCIN HYDROCHLORIDE SCH MLS/HR: 1 INJECTION, POWDER, FOR SOLUTION INTRAVENOUS at 18:00

## 2020-07-12 NOTE — NUR
Pharmacy TPN Dosing Note



S: JESENIA BEAN is a 49 year old F Currently receiving Central Continuous TPN started 
03/18/20



B:Pertinent PMH: 

Necrotizing pancreatitis

Height: 5 feet, 8 inches

Weight: 97.5 kg



Current diet: NPO 



LABS:

Sodium:    139 

Potassium: 4.6 

Chloride:  103 

Calcium:   9.3 

Corrected Calcium: 11.62 

Magnesium: 2 

CO2:       31 

SCr:       0.5 

Glucose:   122 

Albumin:   1.1 

AST:       25 

ALT:       37 



TPN FORMULA:

TPN TYPE:  Central Continuous

AMINO ACIDS:         40 gm

DEXTROSE:            225 gm

LIPIDS:              20 gm

SODIUM CHLORIDE:     90 mEq

SODIUM ACETATE:      - mEq

SODIUM PHOSPHATE:    - mmol

POTASSIUM CHLORIDE:  30 mEq

POTASSIUM ACETATE:   30 mEq

POTASSIUM PHOSPHATE: - mmol

MAGNESIUM:           15 mEq

CALCIUM:             - mEq

INSULIN:             15 units

MULTIPLE VITAMIN:    10 ml

TRACE ELEMENTS:      1 ml ml(s)



TPN PLAN: LAB STABLE,

CONT SAME TPN.





R: Continue TPN AT 70 ML/HR

Will monitor electrolytes, glucose, and tolerance to TPN.



 YENIFER STREETER [facility], 07/12/20 [id number]

## 2020-07-12 NOTE — NUR
Pt. progressively more tachycardic and tachypneic throughout the shift.  ~ Decided to place 
pt. on vent to rest for the remainder of the shift, upon connecting to vent pt. began 
retching continuously, mouthing to "take it off" disconnected from vent and returned to 
trach shield at previous settings.  At 0400 assessment pt. flushed and diaphoretic, temp 
101.9, tachycardic in the 140s with RR in the mid 40s, VT acetaminophen administered, paged 
Dr. Stephens who returned call, updated on the events of the shift and current assessment, 
orders received.  Phoned Rolf (boyfriend) and updated on current pt. condition.  Immediately 
prior to CT pt coughing large amount of dark green liquid from trach, tracheal suctioning 
performed and CT completed.

## 2020-07-12 NOTE — PDOC
PROGRESS NOTES


Chief Complaint


Chief Complaint


A/P


Acute hypoxic Respiratory failure required  mechanical ventilation


Tracheostomy


bilateral pleural effusions/pulm edema s/p Throacentesis on 6/15/2020


Severe Acute gallstone pancreatitis (not a surgical candidate at this time) with

necrosis


Acute kidney failure now requiring dialysis


Gallstones (Calculus of gallbladder with acute cholecystitis without 

obstruction)


HTN 


Intractable pain


Intractable nausea


Covid 19 negative. 


Acute on chronic anemia 


EEG: No seizure activityFever  - better currently - intermittent could be from 

underlying pancreatitis blood cults 5/4 - neg so far


? Ileus with vomiting


Abd distention - U/S and CT reviewed s/p 0.4 L of opaque, debris-containing 

ascites was removed 5/6


Acute pancreatitis with persistent necrosis


Gallstone pancreatitis with necrosis. 


   -CT A/P 6/6 showed multiple pseudocysts, slight larger on the right. s/p 

drains x 3, 6/7.  + PSAE (MDRO-R Cefepime, Zosyn ANSON < 64) and yeast, 


   -s/p drain 4/27. C. parapsilosis. s/p drain 5/6 + yeast & high amylase; s/p 

additional drain on 5/8. Drains removed. 


Ascites s/p paracentesis 4/15 & 5/6. C. parapsilosis 


JED. off HD. 


A large fluid collection in the pancreatic bed has slightly decreased in size, 

described below, the pancreas itself is difficult to  visualize, which could be 

due to necrosis or obscuration of pancreatic  parenchyma from the surrounding 

fluid collection.6/15 


- 4/27 status post ROBERT drain placement + C paropsilosis. s/p additional drains 

5/8


Anemia - S/p PRBCs


Cholelithiasis with thickening of the gallbladder wall.


Leucocytosis improving


EJD, hyperkalemia, Metabolic acidosis off dialysis


hypocalcemia 


Prediabetes


HTN


s/p trach


ESRD on HD


Hyperglycemia


severe protein-caloric malnutrition


Moderate to large left pleural effusion with atelectasis and collapse  of most 

of the left lower lobe, stable


 


Dispo - ICU, critically ill


Poor prognosis





History of Present Illness


History of Present Illness


6/8: IR placed drain on 6/7. 4u PRBC after Hb drop. Hb 8.8 today. Off Levophed 

this morning. T-max 100.3. Much more lethargic today. CXR with left sided 

diffuse infiltrates.


6/9: Tachycardic overnight into the 140s. NGT clamped. On BIPAP currently. 

Drains with serosanguinous discharge. WBC 8, Tmax 99.6F.


6/10: Seen on trach shield in ICU.  Hypertensive and tachycardic. Labs stable. 

blood stained drainage from drains. Afebrile.


6/11: Seen on trach shield in ICU. She is a bit confused, drowsy, but when 

sitting up is conversational and confusion somewhat clears. She is asking for 

more pain medication. Stable drains, still very tachy.Na 147


6/12: Patient vomited overnight. Aspirated. Tried to pull her trach out, she was

told she would die without her trach, she said "I know, I just want to go home".

Hb 7.6. Afebrile, still very tachycardic. 1055ml out of right sided ROBERT drain


6/13: Overnight hypoxic, on BIPAP. CXR with left sided white out lung. 

Significant mucous plug suctioned by RT with improvement in her ABG after 2 

hours this morning. Not really active, tired, lethargic. 890ml out of drains 

past 24 hours. On vent. D/w daughter bedside.


6/29: To OR for pancreatic necrosectomy, cholecystectomy, lysis of adhesions, 

gastrostomy tube placement





7/7,  awake on vent, weaning sedation, cont other, still with marked drainage


7/8: Still on fentanyl. WBC 15.4, Hb 8.7 Metabolic panel WNL except calcium 10.2

with albumin 1.1. She awakens off sedation, still connected to vent currently.


7/9: FiO2 40% 5 PEEP, WBC 13.4, Hb 7.9, platelets 551.  Still on fentanyl, she 

is working with PT.


7/10: Off vent during the day. Working with PT. Labs stable. Hb to 8.5. Working 

with PT/OT. She has been suctioning quite a bit of respiratory secretions as 

well as loose bowels. C diff pending.


7/11: Afebrile, still with loose bowels overnight C. difficile negative.  WBC up

to 16 coughing up thick yellow sputum able to cough out her trach when taken off

trach shield she still has O2 saturations 97%.  Gastrostomy tube to gravity with

fairly significant drainage that appears to be tube feeds and gastric 

secretions.  She notes pain is better controlled with the fentanyl patch.  Blood

pressure elevated heart rate elevated is on metoprolol.





Overnight febrile 101.9F, coughing up more thick yellow and green secretions. 

FiO2 33% with 94% O2 saturations. Had x3 BM yesterday. Cultures sent for sputum,

fungal and blood cultures





prognosis still poor, but improving slowly


Cont ICU





Vitals


Vitals





Vital Signs








  Date Time  Temp Pulse Resp B/P (MAP) Pulse Ox O2 Delivery O2 Flow Rate FiO2


 


7/12/20 14:00 99.2 123 36 132/86 (101) 95 Trach shield 9.0 





 99.2       











Physical Exam


Physical Exam


GENERAL: Propped up in bed, awake, weak appearing 


HEENT: Pupils equal, oral cavity dry. NGT out


NECK:  Tracheostomy 


LUNGS: Diminished aeration bases,  CT on left 


HEART:  S1, S2, regular, tachy 


ABDOMEN: Sightly less distended, bowel sounds hypoactive, soft, richardson x 2, 3 ROBERT

drains, G-J tube and + wound vac 


: [place] in place 


EXTREMITIES: Generalized edema, no cyanosis. SCDs & Podus boots bilaterally, 


SKIN: warm touch. No signs of rash.  


NEURO: awake, mouthing some words, tracking 


LUE-PICC without signs of complications 


LUE art-line out, mottling about old art-line site is improving. RP palpable, 

cap refill brisk.


General:  Alert, Cooperative, mild distress


Heart:  Regular rate (SR/ST), Other (distant heart sounds)


Lungs:  Crackles


Abdomen:  Soft, No tenderness, Other (drains in place with pancreatic necrosis)


Extremities:  Other (Diffuse edema)


Skin:  No rashes, No significant lesion





Labs


LABS





Laboratory Tests








Test


 7/11/20


17:27 7/12/20


00:26 7/12/20


05:15 7/12/20


12:38


 


Glucose (Fingerstick)


 116 mg/dL


(70-99) 120 mg/dL


(70-99) 


 302 mg/dL


(70-99)


 


White Blood Count


 


 


 18.5 x10^3/uL


(4.0-11.0) 





 


Red Blood Count


 


 


 2.99 x10^6/uL


(3.50-5.40) 





 


Hemoglobin


 


 


 8.8 g/dL


(12.0-15.5) 





 


Hematocrit


 


 


 26.3 %


(36.0-47.0) 





 


Mean Corpuscular Volume   88 fL ()  


 


Mean Corpuscular Hemoglobin   29 pg (25-35)  


 


Mean Corpuscular Hemoglobin


Concent 


 


 33 g/dL


(31-37) 





 


Red Cell Distribution Width


 


 


 15.0 %


(11.5-14.5) 





 


Platelet Count


 


 


 693 x10^3/uL


(140-400) 





 


Neutrophils (%) (Auto)   82 % (31-73)  


 


Lymphocytes (%) (Auto)   10 % (24-48)  


 


Monocytes (%) (Auto)   7 % (0-9)  


 


Eosinophils (%) (Auto)   1 % (0-3)  


 


Basophils (%) (Auto)   0 % (0-3)  


 


Neutrophils # (Auto)


 


 


 15.1 x10^3/uL


(1.8-7.7) 





 


Lymphocytes # (Auto)


 


 


 1.9 x10^3/uL


(1.0-4.8) 





 


Monocytes # (Auto)


 


 


 1.3 x10^3/uL


(0.0-1.1) 





 


Eosinophils # (Auto)


 


 


 0.1 x10^3/uL


(0.0-0.7) 





 


Basophils # (Auto)


 


 


 0.0 x10^3/uL


(0.0-0.2) 





 


Sodium Level


 


 


 139 mmol/L


(136-145) 





 


Potassium Level


 


 


 4.6 mmol/L


(3.5-5.1) 





 


Chloride Level


 


 


 103 mmol/L


() 





 


Carbon Dioxide Level


 


 


 30 mmol/L


(21-32) 





 


Anion Gap   6 (6-14)  


 


Blood Urea Nitrogen


 


 


 11 mg/dL


(7-20) 





 


Creatinine


 


 


 0.5 mg/dL


(0.6-1.0) 





 


Estimated GFR


(Cockcroft-Gault) 


 


 131.1 


 





 


BUN/Creatinine Ratio   22 (6-20)  


 


Glucose Level


 


 


 122 mg/dL


(70-99) 





 


Calcium Level


 


 


 9.3 mg/dL


(8.5-10.1) 





 


Total Bilirubin


 


 


 0.5 mg/dL


(0.2-1.0) 





 


Aspartate Amino Transf


(AST/SGOT) 


 


 23 U/L (15-37) 


 





 


Alanine Aminotransferase


(ALT/SGPT) 


 


 35 U/L (14-59) 


 





 


Alkaline Phosphatase


 


 


 156 U/L


() 





 


Total Protein


 


 


 4.8 g/dL


(6.4-8.2) 





 


Albumin


 


 


 1.5 g/dL


(3.4-5.0) 





 


Albumin/Globulin Ratio   0.5 (1.0-1.7)  











Assessment and Plan


Assessmemt and Plan


Problems


Medical Problems:


(1) Acute pancreatitis


Status: Acute  





(2) Cholelithiasis


Status: Acute  











Comment


Review of Relevant


I have reviewed the following items josy (where applicable) has been applied.


Labs





Laboratory Tests








Test


 7/10/20


18:00 7/11/20


00:38 7/11/20


06:10 7/11/20


06:14


 


Glucose (Fingerstick)


 138 mg/dL


(70-99) 147 mg/dL


(70-99) 


 159 mg/dL


(70-99)


 


White Blood Count


 


 


 16.0 x10^3/uL


(4.0-11.0) 





 


Red Blood Count


 


 


 2.99 x10^6/uL


(3.50-5.40) 





 


Hemoglobin


 


 


 8.8 g/dL


(12.0-15.5) 





 


Hematocrit


 


 


 26.6 %


(36.0-47.0) 





 


Mean Corpuscular Volume   89 fL ()  


 


Mean Corpuscular Hemoglobin   29 pg (25-35)  


 


Mean Corpuscular Hemoglobin


Concent 


 


 33 g/dL


(31-37) 





 


Red Cell Distribution Width


 


 


 14.8 %


(11.5-14.5) 





 


Platelet Count


 


 


 728 x10^3/uL


(140-400) 





 


Neutrophils (%) (Auto)   87 % (31-73)  


 


Lymphocytes (%) (Auto)   6 % (24-48)  


 


Monocytes (%) (Auto)   7 % (0-9)  


 


Eosinophils (%) (Auto)   1 % (0-3)  


 


Basophils (%) (Auto)   0 % (0-3)  


 


Neutrophils # (Auto)


 


 


 13.9 x10^3/uL


(1.8-7.7) 





 


Lymphocytes # (Auto)


 


 


 0.9 x10^3/uL


(1.0-4.8) 





 


Monocytes # (Auto)


 


 


 1.0 x10^3/uL


(0.0-1.1) 





 


Eosinophils # (Auto)


 


 


 0.1 x10^3/uL


(0.0-0.7) 





 


Basophils # (Auto)


 


 


 0.0 x10^3/uL


(0.0-0.2) 





 


Sodium Level


 


 


 142 mmol/L


(136-145) 





 


Potassium Level


 


 


 4.7 mmol/L


(3.5-5.1) 





 


Chloride Level


 


 


 107 mmol/L


() 





 


Carbon Dioxide Level


 


 


 31 mmol/L


(21-32) 





 


Anion Gap   4 (6-14)  


 


Blood Urea Nitrogen


 


 


 13 mg/dL


(7-20) 





 


Creatinine


 


 


 0.5 mg/dL


(0.6-1.0) 





 


Estimated GFR


(Cockcroft-Gault) 


 


 131.1 


 





 


Glucose Level


 


 


 169 mg/dL


(70-99) 





 


Calcium Level


 


 


 9.4 mg/dL


(8.5-10.1) 





 


Test


 7/11/20


11:17 7/11/20


17:27 7/12/20


00:26 7/12/20


05:15


 


Glucose (Fingerstick)


 144 mg/dL


(70-99) 116 mg/dL


(70-99) 120 mg/dL


(70-99) 





 


White Blood Count


 


 


 


 18.5 x10^3/uL


(4.0-11.0)


 


Red Blood Count


 


 


 


 2.99 x10^6/uL


(3.50-5.40)


 


Hemoglobin


 


 


 


 8.8 g/dL


(12.0-15.5)


 


Hematocrit


 


 


 


 26.3 %


(36.0-47.0)


 


Mean Corpuscular Volume    88 fL () 


 


Mean Corpuscular Hemoglobin    29 pg (25-35) 


 


Mean Corpuscular Hemoglobin


Concent 


 


 


 33 g/dL


(31-37)


 


Red Cell Distribution Width


 


 


 


 15.0 %


(11.5-14.5)


 


Platelet Count


 


 


 


 693 x10^3/uL


(140-400)


 


Neutrophils (%) (Auto)    82 % (31-73) 


 


Lymphocytes (%) (Auto)    10 % (24-48) 


 


Monocytes (%) (Auto)    7 % (0-9) 


 


Eosinophils (%) (Auto)    1 % (0-3) 


 


Basophils (%) (Auto)    0 % (0-3) 


 


Neutrophils # (Auto)


 


 


 


 15.1 x10^3/uL


(1.8-7.7)


 


Lymphocytes # (Auto)


 


 


 


 1.9 x10^3/uL


(1.0-4.8)


 


Monocytes # (Auto)


 


 


 


 1.3 x10^3/uL


(0.0-1.1)


 


Eosinophils # (Auto)


 


 


 


 0.1 x10^3/uL


(0.0-0.7)


 


Basophils # (Auto)


 


 


 


 0.0 x10^3/uL


(0.0-0.2)


 


Sodium Level


 


 


 


 139 mmol/L


(136-145)


 


Potassium Level


 


 


 


 4.6 mmol/L


(3.5-5.1)


 


Chloride Level


 


 


 


 103 mmol/L


()


 


Carbon Dioxide Level


 


 


 


 30 mmol/L


(21-32)


 


Anion Gap    6 (6-14) 


 


Blood Urea Nitrogen


 


 


 


 11 mg/dL


(7-20)


 


Creatinine


 


 


 


 0.5 mg/dL


(0.6-1.0)


 


Estimated GFR


(Cockcroft-Gault) 


 


 


 131.1 





 


BUN/Creatinine Ratio    22 (6-20) 


 


Glucose Level


 


 


 


 122 mg/dL


(70-99)


 


Calcium Level


 


 


 


 9.3 mg/dL


(8.5-10.1)


 


Total Bilirubin


 


 


 


 0.5 mg/dL


(0.2-1.0)


 


Aspartate Amino Transf


(AST/SGOT) 


 


 


 23 U/L (15-37) 





 


Alanine Aminotransferase


(ALT/SGPT) 


 


 


 35 U/L (14-59) 





 


Alkaline Phosphatase


 


 


 


 156 U/L


()


 


Total Protein


 


 


 


 4.8 g/dL


(6.4-8.2)


 


Albumin


 


 


 


 1.5 g/dL


(3.4-5.0)


 


Albumin/Globulin Ratio    0.5 (1.0-1.7) 


 


Test


 7/12/20


12:38 


 


 





 


Glucose (Fingerstick)


 302 mg/dL


(70-99) 


 


 











Laboratory Tests








Test


 7/11/20


17:27 7/12/20


00:26 7/12/20


05:15 7/12/20


12:38


 


Glucose (Fingerstick)


 116 mg/dL


(70-99) 120 mg/dL


(70-99) 


 302 mg/dL


(70-99)


 


White Blood Count


 


 


 18.5 x10^3/uL


(4.0-11.0) 





 


Red Blood Count


 


 


 2.99 x10^6/uL


(3.50-5.40) 





 


Hemoglobin


 


 


 8.8 g/dL


(12.0-15.5) 





 


Hematocrit


 


 


 26.3 %


(36.0-47.0) 





 


Mean Corpuscular Volume   88 fL ()  


 


Mean Corpuscular Hemoglobin   29 pg (25-35)  


 


Mean Corpuscular Hemoglobin


Concent 


 


 33 g/dL


(31-37) 





 


Red Cell Distribution Width


 


 


 15.0 %


(11.5-14.5) 





 


Platelet Count


 


 


 693 x10^3/uL


(140-400) 





 


Neutrophils (%) (Auto)   82 % (31-73)  


 


Lymphocytes (%) (Auto)   10 % (24-48)  


 


Monocytes (%) (Auto)   7 % (0-9)  


 


Eosinophils (%) (Auto)   1 % (0-3)  


 


Basophils (%) (Auto)   0 % (0-3)  


 


Neutrophils # (Auto)


 


 


 15.1 x10^3/uL


(1.8-7.7) 





 


Lymphocytes # (Auto)


 


 


 1.9 x10^3/uL


(1.0-4.8) 





 


Monocytes # (Auto)


 


 


 1.3 x10^3/uL


(0.0-1.1) 





 


Eosinophils # (Auto)


 


 


 0.1 x10^3/uL


(0.0-0.7) 





 


Basophils # (Auto)


 


 


 0.0 x10^3/uL


(0.0-0.2) 





 


Sodium Level


 


 


 139 mmol/L


(136-145) 





 


Potassium Level


 


 


 4.6 mmol/L


(3.5-5.1) 





 


Chloride Level


 


 


 103 mmol/L


() 





 


Carbon Dioxide Level


 


 


 30 mmol/L


(21-32) 





 


Anion Gap   6 (6-14)  


 


Blood Urea Nitrogen


 


 


 11 mg/dL


(7-20) 





 


Creatinine


 


 


 0.5 mg/dL


(0.6-1.0) 





 


Estimated GFR


(Cockcroft-Gault) 


 


 131.1 


 





 


BUN/Creatinine Ratio   22 (6-20)  


 


Glucose Level


 


 


 122 mg/dL


(70-99) 





 


Calcium Level


 


 


 9.3 mg/dL


(8.5-10.1) 





 


Total Bilirubin


 


 


 0.5 mg/dL


(0.2-1.0) 





 


Aspartate Amino Transf


(AST/SGOT) 


 


 23 U/L (15-37) 


 





 


Alanine Aminotransferase


(ALT/SGPT) 


 


 35 U/L (14-59) 


 





 


Alkaline Phosphatase


 


 


 156 U/L


() 





 


Total Protein


 


 


 4.8 g/dL


(6.4-8.2) 





 


Albumin


 


 


 1.5 g/dL


(3.4-5.0) 





 


Albumin/Globulin Ratio   0.5 (1.0-1.7)  








Microbiology


6/30/20 Gram Stain - Final, Complete


          


6/30/20 Aerobic and Anaerobic Culture - Final, Complete


          


6/30/20 Antimicrobic Susceptibility - Final, Complete


          


6/28/20 Blood Culture - Final, Complete


          NO GROWTH AFTER 5 DAYS


6/15/20 Gram Stain - Final, Complete


          


6/15/20 Aerobic and Anaerobic Culture - Final, Complete


          


6/13/20 Gram Stain Evaluation - Final, Complete


          


6/13/20 Respiratory Culture - Final, Complete


          


6/13/20 Antimicrobic Susceptibility - Final, Complete


          


6/7/20 Urine Culture - Final, Complete


         


5/30/20 Gram Stain - Final, Complete


          


5/30/20 Aerobic Culture - Final, Complete


Medications





Current Medications


Sodium Chloride 1,000 ml @  1,000 mls/hr Q1H IV  Last administered on 3/16/20at 

03:00;  Start 3/16/20 at 03:00;  Stop 3/16/20 at 03:59;  Status DC


Ondansetron HCl (Zofran) 4 mg 1X  ONCE IVP  Last administered on 3/16/20at 

03:27;  Start 3/16/20 at 03:00;  Stop 3/16/20 at 03:01;  Status DC


Morphine Sulfate (Morphine Sulfate) 4 mg 1X  ONCE IV ;  Start 3/16/20 at 03:00; 

Stop 3/16/20 at 03:01;  Status Cancel


Ketorolac Tromethamine (Toradol 30mg Vial) 30 mg 1X  ONCE IV  Last administered 

on 3/16/20at 02:54;  Start 3/16/20 at 03:00;  Stop 3/16/20 at 03:01;  Status DC


Fentanyl Citrate (Fentanyl 2ml Vial) 25 mcg 1X  ONCE IVP  Last administered on 

3/16/20at 03:23;  Start 3/16/20 at 03:30;  Stop 3/16/20 at 03:31;  Status DC


Fentanyl Citrate (Fentanyl 2ml Vial) 100 mcg STK-MED ONCE .ROUTE ;  Start 

3/16/20 at 03:18;  Stop 3/16/20 at 03:18;  Status DC


Iohexol (Omnipaque 350 Mg/ml) 90 ml 1X  ONCE IV  Last administered on 3/16/20at 

03:25;  Start 3/16/20 at 03:30;  Stop 3/16/20 at 03:31;  Status DC


Info (CONTRAST GIVEN -- Rx MONITORING) 1 each PRN DAILY  PRN MC SEE COMMENTS;  

Start 3/16/20 at 03:30;  Stop 3/18/20 at 03:29;  Status DC


Hydromorphone HCl (Dilaudid) 0.5 mg 1X  ONCE IV  Last administered on 3/16/20at 

03:55;  Start 3/16/20 at 04:30;  Stop 3/16/20 at 04:32;  Status DC


Ondansetron HCl (Zofran) 4 mg PRN Q8HRS  PRN IV NAUSEA/VOMITING 1ST CHOICE;  

Start 3/16/20 at 05:00;  Stop 3/16/20 at 09:27;  Status DC


Morphine Sulfate (Morphine Sulfate) 2 mg PRN Q2HR  PRN IV SEVERE PAIN 7-10 Last 

administered on 3/17/20at 12:26;  Start 3/16/20 at 05:00;  Stop 3/17/20 at 

14:15;  Status DC


Sodium Chloride 1,000 ml @  125 mls/hr Q8H IV  Last administered on 3/16/20at 

20:56;  Start 3/16/20 at 05:00;  Stop 3/17/20 at 04:59;  Status DC


Hydromorphone HCl (Dilaudid) 0.5 mg PRN Q3HRS  PRN IV SEVERE PAIN 7-10 Last 

administered on 3/17/20at 10:06;  Start 3/16/20 at 05:00;  Stop 3/17/20 at 

12:01;  Status DC


Piperacillin Sod/ Tazobactam Sod 4.5 gm/Sodium Chloride 100 ml @  200 mls/hr 1X 

ONCE IV  Last administered on 3/16/20at 05:44;  Start 3/16/20 at 06:00;  Stop 

3/16/20 at 06:29;  Status DC


Ondansetron HCl (Zofran) 4 mg PRN Q4HRS  PRN IV NAUSEA/VOMITING 1ST CHOICE Last 

administered on 7/12/20at 08:22;  Start 3/16/20 at 09:30


Insulin Human Lispro (HumaLOG) 0-9 UNITS Q6HRS SQ  Last administered on 

7/12/20at 12:43;  Start 3/16/20 at 09:30


Dextrose (Dextrose 50%-Water Syringe) 12.5 gm PRN Q15MIN  PRN IV SEE COMMENTS;  

Start 3/16/20 at 09:30


Pantoprazole Sodium (PROTONIX VIAL for IV PUSH) 40 mg DAILYAC IVP  Last 

administered on 7/12/20at 08:18;  Start 3/16/20 at 11:30


Prochlorperazine Edisylate (Compazine) 10 mg PRN Q6HRS  PRN IV NAUSEA/VOMITING, 

2nd CHOICE Last administered on 6/27/20at 10:53;  Start 3/16/20 at 17:45


Atenolol (Tenormin) 100 mg DAILY PO ;  Start 3/17/20 at 09:00;  Stop 3/16/20 at 

20:08;  Status DC


Metoprolol Tartrate (Lopressor Vial) 2.5 mg Q6HRS IVP  Last administered on 

3/17/20at 05:51;  Start 3/16/20 at 20:15;  Stop 3/17/20 at 10:02;  Status DC


Metoprolol Tartrate (Lopressor Vial) 5 mg Q6HRS IVP  Last administered on 3/

26/20at 00:12;  Start 3/17/20 at 10:15;  Stop 3/28/20 at 08:48;  Status DC


Hydromorphone HCl (Dilaudid) 1 mg PRN Q3HRS  PRN IV SEVERE PAIN 7-10 Last 

administered on 3/23/20at 05:13;  Start 3/17/20 at 12:00;  Stop 3/31/20 at 

00:25;  Status DC


Lidocaine HCl (Buffered Lidocaine 1%) 3 ml STK-MED ONCE .ROUTE ;  Start 3/17/20 

at 12:55;  Stop 3/17/20 at 12:56;  Status DC


Albumin Human 500 ml @  125 mls/hr 1X  ONCE IV  Last administered on 3/17/20at 

14:33;  Start 3/17/20 at 14:30;  Stop 3/17/20 at 18:32;  Status DC


Norepinephrine Bitartrate 8 mg/ Dextrose 258 ml @  17.299 mls/ hr CONT  PRN IV 

PER PROTOCOL Last administered on 4/14/20at 12:48;  Start 3/17/20 at 15:30;  

Stop 4/17/20 at 09:19;  Status DC


Sodium Chloride 1,000 ml @  125 mls/hr Q8H IV  Last administered on 3/17/20at 

21:04;  Start 3/17/20 at 16:00;  Stop 3/18/20 at 02:42;  Status DC


Albumin Human 500 ml @  125 mls/hr PRN BID  PRN IV After every 2L NSS & BP < 

90mm Last administered on 6/30/20at 16:06;  Start 3/17/20 at 16:00;  Stop 7/3/20

at 09:30;  Status DC


Iohexol (Omnipaque 300 Mg/ml) 60 ml 1X  ONCE IV  Last administered on 3/17/20at 

17:20;  Start 3/17/20 at 17:00;  Stop 3/17/20 at 17:01;  Status DC


Info (CONTRAST GIVEN -- Rx MONITORING) 1 each PRN DAILY  PRN MC SEE COMMENTS;  

Start 3/17/20 at 17:00;  Stop 3/19/20 at 16:59;  Status DC


Meropenem 1 gm/ Sodium Chloride 100 ml @  200 mls/hr Q8HRS IV  Last administered

on 3/18/20at 05:45;  Start 3/17/20 at 20:00;  Stop 3/18/20 at 08:48;  Status DC


Furosemide (Lasix) 40 mg 1X  ONCE IVP  Last administered on 3/17/20at 22:12;  

Start 3/17/20 at 22:30;  Stop 3/17/20 at 22:31;  Status DC


Calcium Chloride 1000 mg/Sodium Chloride 110 ml @  220 mls/hr 1X  ONCE IV  Last 

administered on 3/17/20at 22:11;  Start 3/17/20 at 22:30;  Stop 3/17/20 at 

22:59;  Status DC


Albuterol Sulfate (Ventolin Neb Soln) 2.5 mg 1X  ONCE NEB  Last administered on 

3/18/20at 00:56;  Start 3/17/20 at 22:30;  Stop 3/17/20 at 22:31;  Status DC


Insulin Human Regular (HumuLIN R VIAL) 5 unit 1X  ONCE IV  Last administered on 

3/17/20at 22:14;  Start 3/17/20 at 22:30;  Stop 3/17/20 at 22:31;  Status DC


Magnesium Sulfate 50 ml @ 25 mls/hr 1X  ONCE IV  Last administered on 3/18/20at 

02:57;  Start 3/18/20 at 03:00;  Stop 3/18/20 at 04:59;  Status DC


Calcium Gluconate 1000 mg/Sodium Chloride 110 ml @  220 mls/hr 1X  ONCE IV  Last

administered on 3/18/20at 02:46;  Start 3/18/20 at 03:00;  Stop 3/18/20 at 

03:29;  Status DC


Sodium Chloride 1,000 ml @  200 mls/hr Q5H IV  Last administered on 3/18/20at 

02:46;  Start 3/18/20 at 03:00;  Stop 3/18/20 at 10:21;  Status DC


Calcium Gluconate 1000 mg/Sodium Chloride 110 ml @  220 mls/hr 1X  ONCE IV  Last

administered on 3/18/20at 03:21;  Start 3/18/20 at 03:30;  Stop 3/18/20 at 

03:59;  Status DC


Sodium Bicarbonate 50 meq/Sodium Chloride 1,050 ml @  75 mls/hr Q14H IV  Last a

dministered on 3/22/20at 21:10;  Start 3/18/20 at 07:30;  Stop 3/23/20 at 10:28;

 Status DC


Calcium Gluconate 2000 mg/Sodium Chloride 120 ml @  220 mls/hr 1X  ONCE IV  Last

administered on 3/18/20at 09:05;  Start 3/18/20 at 07:30;  Stop 3/18/20 at 

08:02;  Status DC


Lidocaine HCl (Xylocaine-Mpf 1% 2ml Vial) 2 ml STK-MED ONCE .ROUTE ;  Start 

3/18/20 at 08:47;  Stop 3/18/20 at 08:47;  Status DC


Meropenem 500 mg/ Sodium Chloride 50 ml @  100 mls/hr Q12HR IV  Last 

administered on 3/23/20at 21:01;  Start 3/18/20 at 18:00;  Stop 3/24/20 at 

07:58;  Status DC


Lidocaine HCl (Buffered Lidocaine 1%) 3 ml STK-MED ONCE .ROUTE ;  Start 3/18/20 

at 09:46;  Stop 3/18/20 at 09:46;  Status DC


Lidocaine HCl (Buffered Lidocaine 1%) 6 ml 1X  ONCE INJ  Last administered on 

3/18/20at 10:26;  Start 3/18/20 at 10:15;  Stop 3/18/20 at 10:16;  Status DC


Info (Tpn Per Pharmacy) 1 each PRN DAILY  PRN MC SEE COMMENTS Last administered 

on 7/12/20at 09:52;  Start 3/18/20 at 12:00


Sodium Chloride 1,000 ml @  1,000 mls/hr Q1H PRN IV hypotension;  Start 3/18/20 

at 12:07;  Stop 3/18/20 at 18:06;  Status DC


Diphenhydramine HCl (Benadryl) 25 mg 1X PRN  PRN IV ITCHING;  Start 3/18/20 at 

12:15;  Stop 3/19/20 at 12:14;  Status DC


Diphenhydramine HCl (Benadryl) 25 mg 1X PRN  PRN IV ITCHING;  Start 3/18/20 at 

12:15;  Stop 3/19/20 at 12:14;  Status DC


Sodium Chloride 1,000 ml @  400 mls/hr Q2H30M PRN IV PATENCY;  Start 3/18/20 at 

12:07;  Stop 3/19/20 at 00:06;  Status DC


Info (PHARMACY MONITORING -- do not chart) 1 each PRN DAILY  PRN MC SEE 

COMMENTS;  Start 3/18/20 at 12:15;  Stop 3/20/20 at 08:13;  Status DC


Sodium Chloride 90 meq/Calcium Gluconate 10 meq/ Multivitamins 10 ml/Chromium/ 

Copper/Manganese/ Seleni/Zn 1 ml/ Total Parenteral Nutrition/Amino 

Acids/Dextrose/ Fat Emulsion Intravenous 55.005 ml  @ 2.292 mls/hr TPN  CONT IV 

;  Start 3/18/20 at 22:00;  Stop 3/18/20 at 12:33;  Status DC


Info (Tpn Per Pharmacy) 1 each PRN DAILY  PRN MC SEE COMMENTS;  Start 3/18/20 at

12:30;  Status UNV


Sodium Chloride 90 meq/Calcium Gluconate 10 meq/ Multivitamins 10 ml/Chromium/ 

Copper/Manganese/ Seleni/Zn 0.5 ml/ Total Parenteral Nutrition/Amino 

Acids/Dextrose/ Fat Emulsion Intravenous 1,512 ml @  63 mls/hr TPN  CONT IV  

Last administered on 3/18/20at 22:06;  Start 3/18/20 at 22:00;  Stop 3/19/20 at 

21:59;  Status DC


Calcium Carbonate/ Glycine (Tums) 500 mg PRN AFTMEALHC  PRN PO INDIGESTION;  

Start 3/18/20 at 17:45;  Stop 5/13/20 at 10:25;  Status DC


Calcium Gluconate (Calcium Gluconate) 2,000 mg 1X  ONCE IVP  Last administered 

on 3/19/20at 02:19;  Start 3/19/20 at 02:15;  Stop 3/19/20 at 02:16;  Status DC


Calcium Chloride 3000 mg/Sodium Chloride 1,030 ml @  50 mls/hr [id number] IV  Last 

administered on 3/21/20at 02:17;  Start 3/19/20 at 08:00;  Stop 3/21/20 at 

15:23;  Status DC


Lorazepam (Ativan Inj) 1 mg PRN Q4HRS  PRN IVP ANXIETY / AGITATION, 2nd choic 

Last administered on 4/17/20at 03:51;  Start 3/19/20 at 09:00;  Stop 4/17/20 at 

09:19;  Status DC


Sodium Chloride 1,000 ml @  1,000 mls/hr Q1H PRN IV hypotension;  Start 3/19/20 

at 08:56;  Stop 3/19/20 at 14:55;  Status DC


Albumin Human 200 ml @  200 mls/hr 1X PRN  PRN IV Hypotension;  Start 3/19/20 at

09:00;  Stop 3/19/20 at 14:59;  Status DC


Diphenhydramine HCl (Benadryl) 25 mg 1X PRN  PRN IV ITCHING;  Start 3/19/20 at 

09:00;  Stop 3/20/20 at 08:59;  Status DC


Diphenhydramine HCl (Benadryl) 25 mg 1X PRN  PRN IV ITCHING;  Start 3/19/20 at 

09:00;  Stop 3/20/20 at 08:59;  Status DC


Sodium Chloride 1,000 ml @  400 mls/hr Q2H30M PRN IV PATENCY;  Start 3/19/20 at 

08:56;  Stop 3/19/20 at 20:55;  Status DC


Info (PHARMACY MONITORING -- do not chart) 1 each PRN DAILY  PRN MC SEE 

COMMENTS;  Start 3/19/20 at 09:00;  Status UNV


Info (PHARMACY MONITORING -- do not chart) 1 each PRN DAILY  PRN MC SEE 

COMMENTS;  Start 3/19/20 at 09:00;  Stop 3/20/20 at 08:13;  Status DC


Digoxin (Lanoxin) 500 mcg 1X  ONCE IV  Last administered on 3/19/20at 10:04;  

Start 3/19/20 at 10:00;  Stop 3/19/20 at 10:01;  Status DC


Digoxin (Lanoxin) 125 mcg 1X  ONCE IV  Last administered on 3/19/20at 17:10;  

Start 3/19/20 at 18:00;  Stop 3/19/20 at 18:01;  Status DC


Magnesium Sulfate 100 ml @  25 mls/hr 1X  ONCE IV  Last administered on 

3/19/20at 12:48;  Start 3/19/20 at 13:00;  Stop 3/19/20 at 16:59;  Status DC


Sodium Chloride 90 meq/Magnesium Sulfate 10 meq/ Calcium Gluconate 20 meq/ 

Multivitamins 10 ml/Chromium/ Copper/Manganese/ Seleni/Zn 0.5 ml/ Total 

Parenteral Nutrition/Amino Acids/Dextrose/ Fat Emulsion Intravenous 1,512 ml @  

63 mls/hr TPN  CONT IV  Last administered on 3/19/20at 22:25;  Start 3/19/20 at 

22:00;  Stop 3/20/20 at 21:59;  Status DC


Sodium Chloride 1,000 ml @  1,000 mls/hr Q1H PRN IV hypotension;  Start 3/20/20 

at 08:05;  Stop 3/20/20 at 14:04;  Status DC


Albumin Human 200 ml @  200 mls/hr 1X  ONCE IV  Last administered on 3/20/20at 

08:57;  Start 3/20/20 at 08:15;  Stop 3/20/20 at 09:14;  Status DC


Diphenhydramine HCl (Benadryl) 25 mg 1X PRN  PRN IV ITCHING;  Start 3/20/20 at 

08:15;  Stop 3/21/20 at 08:14;  Status DC


Diphenhydramine HCl (Benadryl) 25 mg 1X PRN  PRN IV ITCHING;  Start 3/20/20 at 

08:15;  Stop 3/21/20 at 08:14;  Status DC


Sodium Chloride 1,000 ml @  400 mls/hr Q2H30M PRN IV PATENCY;  Start 3/20/20 at 

08:05;  Stop 3/20/20 at 20:04;  Status DC


Info (PHARMACY MONITORING -- do not chart) 1 each PRN DAILY  PRN MC SEE 

COMMENTS;  Start 3/20/20 at 08:15;  Stop 3/24/20 at 07:57;  Status DC


Sodium Chloride 90 meq/Potassium Chloride 15 meq/ Potassium Phosphate 10 mmol/ 

Magnesium Sulfate 10 meq/Calcium Gluconate 20 meq/ Multivitamins 10 ml/Chromium/

Copper/Manganese/ Seleni/Zn 0.5 ml/ Total Parenteral Nutrition/Amino 

Acids/Dextrose/ Fat Emulsion Intravenous 1,512 ml @  63 mls/hr TPN  CONT IV  

Last administered on 3/20/20at 21:01;  Start 3/20/20 at 22:00;  Stop 3/21/20 at 

21:59;  Status DC


Potassium Chloride/Water 100 ml @  100 mls/hr 1X  ONCE IV  Last administered on 

3/20/20at 14:09;  Start 3/20/20 at 14:00;  Stop 3/20/20 at 14:59;  Status DC


Benzocaine (Hurricaine One) 1 spray 1X  ONCE MM  Last administered on 3/20/20at 

16:38;  Start 3/20/20 at 14:30;  Stop 3/20/20 at 14:31;  Status DC


Lidocaine HCl (Glydo (Lidocaine) Jelly) 1 thomas 1X  ONCE MM  Last administered on 

3/20/20at 16:38;  Start 3/20/20 at 14:30;  Stop 3/20/20 at 14:31;  Status DC


Linezolid/Dextrose 300 ml @  300 mls/hr Q12HR IV  Last administered on 3/26/20at

21:04;  Start 3/20/20 at 20:00;  Stop 3/27/20 at 07:50;  Status DC


Acetaminophen (Tylenol) 650 mg PRN Q6HRS  PRN PO MILD PAIN / TEMP;  Start 

3/21/20 at 03:30;  Stop 3/21/20 at 03:36;  Status DC


Acetaminophen (Tylenol) 650 mg PRN Q6HRS  PRN PEG MILD PAIN / TEMP Last 

administered on 4/16/20at 19:56;  Start 3/21/20 at 03:36;  Stop 5/13/20 at 

10:25;  Status DC


Sodium Chloride 1,000 ml @  1,000 mls/hr Q1H PRN IV hypotension;  Start 3/21/20 

at 07:50;  Stop 3/21/20 at 13:49;  Status DC


Albumin Human 200 ml @  200 mls/hr 1X PRN  PRN IV Hypotension;  Start 3/21/20 at

08:00;  Stop 3/21/20 at 13:59;  Status DC


Sodium Chloride (Normal Saline Flush) 10 ml 1X PRN  PRN IV AP catheter pack;  

Start 3/21/20 at 08:00;  Stop 3/22/20 at 07:59;  Status DC


Sodium Chloride (Normal Saline Flush) 10 ml 1X PRN  PRN IV  catheter pack;  

Start 3/21/20 at 08:00;  Stop 3/22/20 at 07:59;  Status DC


Sodium Chloride 1,000 ml @  400 mls/hr Q2H30M PRN IV PATENCY;  Start 3/21/20 at 

07:50;  Stop 3/21/20 at 19:49;  Status DC


Info (PHARMACY MONITORING -- do not chart) 1 each PRN DAILY  PRN MC SEE 

COMMENTS;  Start 3/21/20 at 08:00;  Status UNV


Info (PHARMACY MONITORING -- do not chart) 1 each PRN DAILY  PRN MC SEE 

COMMENTS;  Start 3/21/20 at 08:00;  Stop 3/23/20 at 08:25;  Status DC


Sodium Chloride 90 meq/Potassium Chloride 15 meq/ Potassium Phosphate 10 mmol/ 

Magnesium Sulfate 10 meq/Calcium Gluconate 20 meq/ Multivitamins 10 ml/Chromium/

Copper/Manganese/ Seleni/Zn 0.5 ml/ Total Parenteral Nutrition/Amino 

Acids/Dextrose/ Fat Emulsion Intravenous 1,512 ml @  63 mls/hr TPN  CONT IV  

Last administered on 3/21/20at 20:57;  Start 3/21/20 at 22:00;  Stop 3/22/20 at 

21:59;  Status DC


Sodium Chloride 90 meq/Potassium Chloride 15 meq/ Potassium Phosphate 15 mmol/ 

Magnesium Sulfate 10 meq/Calcium Gluconate 20 meq/ Multivitamins 10 ml/Chromium/

Copper/Manganese/ Seleni/Zn 0.5 ml/ Total Parenteral Nutrition/Amino 

Acids/Dextrose/ Fat Emulsion Intravenous 1,512 ml @  63 mls/hr TPN  CONT IV ;  

Start 3/22/20 at 22:00;  Stop 3/22/20 at 14:16;  Status DC


Sodium Chloride 90 meq/Potassium Chloride 15 meq/ Potassium Phosphate 15 mmol/ 

Magnesium Sulfate 10 meq/Calcium Gluconate 20 meq/ Multivitamins 10 ml/Chromium/

Copper/Manganese/ Seleni/Zn 0.5 ml/ Total Parenteral Nutrition/Amino 

Acids/Dextrose/ Fat Emulsion Intravenous 1,200 ml @  50 mls/hr TPN  CONT IV ;  

Start 3/22/20 at 22:00;  Stop 3/22/20 at 14:17;  Status DC


Sodium Chloride 90 meq/Potassium Chloride 15 meq/ Potassium Phosphate 10 mmol/ 

Magnesium Sulfate 10 meq/Calcium Gluconate 20 meq/ Multivitamins 10 ml/Chromium/

Copper/Manganese/ Seleni/Zn 0.5 ml/ Total Parenteral Nutrition/Amino 

Acids/Dextrose/ Fat Emulsion Intravenous 1,200 ml @  50 mls/hr TPN  CONT IV  

Last administered on 3/22/20at 23:29;  Start 3/22/20 at 22:00;  Stop 3/23/20 at 

21:59;  Status DC


Sodium Chloride 1,000 ml @  1,000 mls/hr Q1H PRN IV hypotension;  Start 3/23/20 

at 07:28;  Stop 3/23/20 at 13:27;  Status DC


Albumin Human 200 ml @  200 mls/hr 1X  ONCE IV  Last administered on 3/23/20at 

08:51;  Start 3/23/20 at 07:30;  Stop 3/23/20 at 08:29;  Status DC


Diphenhydramine HCl (Benadryl) 25 mg 1X PRN  PRN IV ITCHING;  Start 3/23/20 at 0

7:30;  Stop 3/24/20 at 07:29;  Status DC


Diphenhydramine HCl (Benadryl) 25 mg 1X PRN  PRN IV ITCHING;  Start 3/23/20 at 

07:30;  Stop 3/24/20 at 07:29;  Status DC


Sodium Chloride 1,000 ml @  400 mls/hr Q2H30M PRN IV PATENCY;  Start 3/23/20 at 

07:28;  Stop 3/23/20 at 19:27;  Status DC


Info (PHARMACY MONITORING -- do not chart) 1 each PRN DAILY  PRN MC SEE 

COMMENTS;  Start 3/23/20 at 07:30;  Stop 4/3/20 at 13:01;  Status DC


Metronidazole 100 ml @  100 mls/hr Q6HRS IV  Last administered on 4/8/20at 

06:26;  Start 3/23/20 at 08:30;  Stop 4/8/20 at 09:58;  Status DC


Micafungin Sodium 100 mg/Dextrose 100 ml @  100 mls/hr Q24H IV  Last admin

istered on 4/30/20at 08:18;  Start 3/23/20 at 09:00;  Stop 4/30/20 at 20:58;  

Status DC


Propofol 0 ml @ As Directed STK-MED ONCE IV ;  Start 3/23/20 at 07:53;  Stop 

3/23/20 at 07:53;  Status DC


Etomidate (Amidate) 20 mg STK-MED ONCE IV ;  Start 3/23/20 at 07:53;  Stop 

3/23/20 at 07:54;  Status DC


Midazolam HCl (Versed) 5 mg STK-MED ONCE .ROUTE ;  Start 3/23/20 at 07:57;  Stop

3/23/20 at 07:57;  Status DC


Fentanyl Citrate 30 ml @ 0 mls/hr CONT  PRN IV SEE PROTOCOL Last administered on

4/17/20at 06:12;  Start 3/23/20 at 08:15;  Stop 4/17/20 at 09:19;  Status DC


Artificial Tears (Artificial Tears) 1 drop PRN Q1HR  PRN OU DRY EYE, 1st choice;

 Start 3/23/20 at 08:15;  Stop 4/29/20 at 05:31;  Status DC


Midazolam HCl 50 mg/Sodium Chloride 50 ml @ 0 mls/hr CONT  PRN IV SEE PROTOCOL 

Last administered on 3/26/20at 22:39;  Start 3/23/20 at 08:15;  Stop 3/28/20 at 

15:59;  Status DC


Etomidate (Amidate) 8 mg 1X  ONCE IV  Last administered on 3/23/20at 08:33;  

Start 3/23/20 at 08:30;  Stop 3/23/20 at 08:31;  Status DC


Succinylcholine Chloride (Anectine) 120 mg 1X  ONCE IV  Last administered on 

3/23/20at 08:34;  Start 3/23/20 at 08:30;  Stop 3/23/20 at 08:31;  Status DC


Midazolam HCl (Versed) 5 mg 1X  ONCE IV ;  Start 3/23/20 at 08:30;  Stop 3/23/20

at 08:31;  Status DC


Potassium Chloride 15 meq/ Bicarbonate Dialysis Soln w/ out KCl 5,007.5 ml  @ 

1,000 mls/ hr Q5H1M IV  Last administered on 3/24/20at 11:11;  Start 3/23/20 at 

12:00;  Stop 3/24/20 at 11:15;  Status DC


Potassium Chloride 15 meq/ Bicarbonate Dialysis Soln w/ out KCl 5,007.5 ml  @ 

1,000 mls/ hr Q5H1M IV  Last administered on 3/24/20at 11:12;  Start 3/23/20 at 

12:00;  Stop 3/24/20 at 11:17;  Status DC


Potassium Chloride 15 meq/ Bicarbonate Dialysis Soln w/ out KCl 5,007.5 ml  @ 

1,000 mls/ hr Q5H1M IV  Last administered on 3/24/20at 11:11;  Start 3/23/20 at 

12:00;  Stop 3/24/20 at 11:19;  Status DC


Sodium Chloride 90 meq/Potassium Chloride 15 meq/ Potassium Phosphate 10 mmol/ 

Magnesium Sulfate 10 meq/Calcium Gluconate 20 meq/ Multivitamins 10 ml/Chromium/

Copper/Manganese/ Seleni/Zn 0.5 ml/ Total Parenteral Nutrition/Amino 

Acids/Dextrose/ Fat Emulsion Intravenous 1,400 ml @  58.333 mls/ hr TPN  CONT IV

 Last administered on 3/23/20at 21:42;  Start 3/23/20 at 22:00;  Stop 3/24/20 at

21:59;  Status DC


Heparin Sodium (Porcine) (Heparin Sodium) 5,000 unit Q8HRS SQ  Last administered

on 3/28/20at 05:55;  Start 3/23/20 at 15:00;  Stop 3/28/20 at 13:28;  Status DC


Meropenem 500 mg/ Sodium Chloride 50 ml @  100 mls/hr Q6HRS IV  Last 

administered on 3/25/20at 06:00;  Start 3/24/20 at 09:00;  Stop 3/25/20 at 

07:29;  Status DC


Potassium Phosphate 20 mmol/ Sodium Chloride 106.6667 ml @  51.667 m... 1X  ONCE

IV  Last administered on 3/24/20at 11:22;  Start 3/24/20 at 10:15;  Stop 3/24/20

at 12:18;  Status DC


Acetaminophen (Tylenol Supp) 650 mg PRN Q6HRS  PRN OK MILD PAIN / TEMP > 100.3'F

Last administered on 7/12/20at 04:42;  Start 3/24/20 at 10:30


Potassium Chloride/Water 100 ml @  100 mls/hr Q1H IV  Last administered on 

3/24/20at 12:12;  Start 3/24/20 at 11:00;  Stop 3/24/20 at 12:59;  Status DC


Potassium Chloride 20 meq/ Bicarbonate Dialysis Soln w/ out KCl 5,010 ml @  

1,000 mls/hr Q5H1M IV  Last administered on 3/25/20at 08:48;  Start 3/24/20 at 

12:00;  Stop 3/25/20 at 13:03;  Status DC


Potassium Chloride 20 meq/ Bicarbonate Dialysis Soln w/ out KCl 5,010 ml @  

1,000 mls/hr Q5H1M IV  Last administered on 3/29/20at 14:52;  Start 3/24/20 at 

11:30;  Stop 3/29/20 at 19:59;  Status DC


Potassium Chloride 20 meq/ Bicarbonate Dialysis Soln w/ out KCl 5,010 ml @  

1,000 mls/hr Q5H1M IV  Last administered on 3/29/20at 14:53;  Start 3/24/20 at 

11:30;  Stop 3/29/20 at 19:59;  Status DC


Sodium Chloride 90 meq/Potassium Chloride 15 meq/ Potassium Phosphate 15 mmol/ 

Magnesium Sulfate 10 meq/Calcium Gluconate 15 meq/ Multivitamins 10 ml/Chromium/

Copper/Manganese/ Seleni/Zn 0.5 ml/ Total Parenteral Nutrition/Amino 

Acids/Dextrose/ Fat Emulsion Intravenous 1,400 ml @  58.333 mls/ hr TPN  CONT IV

 Last administered on 3/24/20at 22:17;  Start 3/24/20 at 22:00;  Stop 3/25/20 at

21:59;  Status DC


Cefepime HCl (Maxipime) 2 gm Q12HR IVP  Last administered on 4/7/20at 20:56;  

Start 3/25/20 at 09:00;  Stop 4/8/20 at 09:58;  Status DC


Daptomycin 500 mg/ Sodium Chloride 50 ml @  100 mls/hr Q48H IV  Last 

administered on 4/10/20at 09:57;  Start 3/25/20 at 08:30;  Stop 4/10/20 at 

10:07;  Status DC


Lidocaine HCl (Buffered Lidocaine 1%) 3 ml 1X  ONCE INJ  Last administered on 

3/25/20at 10:27;  Start 3/25/20 at 10:30;  Stop 3/25/20 at 10:31;  Status DC


Potassium Phosphate 20 mmol/ Sodium Chloride 106.6667 ml @  51.667 m... 1X  ONCE

IV  Last administered on 3/25/20at 12:51;  Start 3/25/20 at 13:00;  Stop 3/25/20

at 15:03;  Status DC


Sodium Chloride 90 meq/Potassium Chloride 15 meq/ Potassium Phosphate 18 mmol/ 

Magnesium Sulfate 8 meq/Calcium Gluconate 15 meq/ Multivitamins 10 ml/Chromium/ 

Copper/Manganese/ Seleni/Zn 0.5 ml/ Total Parenteral Nutrition/Amino 

Acids/Dextrose/ Fat Emulsion Intravenous 1,400 ml @  58.333 mls/ hr TPN  CONT IV

 Last administered on 3/25/20at 22:16;  Start 3/25/20 at 22:00;  Stop 3/26/20 at

21:59;  Status DC


Potassium Chloride 20 meq/ Bicarbonate Dialysis Soln w/ out KCl 5,010 ml @  

1,000 mls/hr Q5H1M IV  Last administered on 3/29/20at 14:54;  Start 3/25/20 at 

16:00;  Stop 3/29/20 at 19:59;  Status DC


Multi-Ingred Cream/Lotion/Oil/ Oint (Artificial Tears Eye Ointment) 1 thomas PRN 

Q1HR  PRN OU DRY EYE, 2nd choice Last administered on 4/13/20at 08:19;  Start 

3/25/20 at 17:30;  Stop 6/3/20 at 14:39;  Status DC


Sodium Chloride 90 meq/Potassium Chloride 15 meq/ Potassium Phosphate 18 mmol/ 

Magnesium Sulfate 8 meq/Calcium Gluconate 15 meq/ Multivitamins 10 ml/Chromium/ 

Copper/Manganese/ Seleni/Zn 0.5 ml/ Total Parenteral Nutrition/Amino 

Acids/Dextrose/ Fat Emulsion Intravenous 1,400 ml @  58.333 mls/ hr TPN  CONT IV

 Last administered on 3/26/20at 22:00;  Start 3/26/20 at 22:00;  Stop 3/27/20 at

21:59;  Status DC


Albumin Human 500 ml @  125 mls/hr 1X  ONCE IV ;  Start 3/26/20 at 14:15;  Stop 

3/26/20 at 18:14;  Status DC


Sodium Chloride 90 meq/Potassium Chloride 15 meq/ Potassium Phosphate 18 mmol/ 

Magnesium Sulfate 8 meq/Calcium Gluconate 15 meq/ Multivitamins 10 ml/Chromium/ 

Copper/Manganese/ Seleni/Zn 0.5 ml/ Insulin Human Regular 10 unit/ Total 

Parenteral Nutrition/Amino Acids/Dextrose/ Fat Emulsion Intravenous 1,400 ml @  

58.333 mls/ hr TPN  CONT IV  Last administered on 3/27/20at 21:43;  Start 

3/27/20 at 22:00;  Stop 3/28/20 at 21:59;  Status DC


Lidocaine HCl (Buffered Lidocaine 1%) 3 ml STK-MED ONCE .ROUTE ;  Start 3/25/20 

at 10:00;  Stop 3/27/20 at 13:57;  Status DC


Midazolam HCl 100 mg/Sodium Chloride 100 ml @ 7 mls/hr CONT  PRN IV SEE PROTOCOL

Last administered on 4/8/20at 15:35;  Start 3/28/20 at 16:00;  Stop 6/3/20 at 

14:38;  Status DC


Sodium Chloride 90 meq/Potassium Chloride 15 meq/ Potassium Phosphate 18 mmol/ 

Magnesium Sulfate 8 meq/Calcium Gluconate 15 meq/ Multivitamins 10 ml/Chromium/ 

Copper/Manganese/ Seleni/Zn 0.5 ml/ Insulin Human Regular 15 unit/ Total 

Parenteral Nutrition/Amino Acids/Dextrose/ Fat Emulsion Intravenous 1,400 ml @  

58.333 mls/ hr TPN  CONT IV  Last administered on 3/28/20at 20:34;  Start 

3/28/20 at 22:00;  Stop 3/29/20 at 21:59;  Status DC


Info ([facility] Electrolyte Protocol) 1 ea CONT PRN  PRN MC PER PROTOCOL;  Start 

3/29/20 at 13:15


Sodium Chloride 90 meq/Potassium Chloride 15 meq/ Potassium Phosphate 18 mmol/ 

Magnesium Sulfate 8 meq/Calcium Gluconate 15 meq/ Multivitamins 10 ml/Chromium/ 

Copper/Manganese/ Seleni/Zn 0.5 ml/ Insulin Human Regular 15 unit/ Total 

Parenteral Nutrition/Amino Acids/Dextrose/ Fat Emulsion Intravenous 1,400 ml @  

58.333 mls/ hr TPN  CONT IV  Last administered on 3/29/20at 22:05;  Start 

3/29/20 at 22:00;  Stop 3/30/20 at 21:59;  Status DC


Potassium Chloride 15 meq/ Bicarbonate Dialysis Soln w/ out KCl 5,007.5 ml  @ 

1,000 mls/ hr Q5H1M IV  Last administered on 4/1/20at 18:14;  Start 3/29/20 at 

20:00;  Stop 4/2/20 at 13:08;  Status DC


Potassium Chloride 15 meq/ Bicarbonate Dialysis Soln w/ out KCl 5,007.5 ml  @ 

1,000 mls/ hr Q5H1M IV  Last administered on 4/1/20at 18:14;  Start 3/29/20 at 

20:00;  Stop 4/2/20 at 13:08;  Status DC


Potassium Chloride 15 meq/ Bicarbonate Dialysis Soln w/ out KCl 5,007.5 ml  @ 

1,000 mls/ hr Q5H1M IV  Last administered on 4/1/20at 18:14;  Start 3/29/20 at 

20:00;  Stop 4/2/20 at 13:08;  Status DC


Iohexol (Omnipaque 240 Mg/ml) 30 ml 1X  ONCE PO  Last administered on 3/30/20at 

11:30;  Start 3/30/20 at 11:30;  Stop 3/30/20 at 11:33;  Status DC


Info (CONTRAST GIVEN -- Rx MONITORING) 1 each PRN DAILY  PRN MC SEE COMMENTS;  

Start 3/30/20 at 11:45;  Stop 4/1/20 at 11:44;  Status DC


Sodium Chloride 90 meq/Potassium Chloride 15 meq/ Potassium Phosphate 18 mmol/ 

Magnesium Sulfate 8 meq/Calcium Gluconate 15 meq/ Multivitamins 10 ml/Chromium/ 

Copper/Manganese/ Seleni/Zn 0.5 ml/ Insulin Human Regular 15 unit/ Total 

Parenteral Nutrition/Amino Acids/Dextrose/ Fat Emulsion Intravenous 1,400 ml @  

58.333 mls/ hr TPN  CONT IV  Last administered on 3/30/20at 21:47;  Start 

3/30/20 at 22:00;  Stop 3/31/20 at 21:59;  Status DC


Sodium Chloride 90 meq/Potassium Chloride 15 meq/ Potassium Phosphate 18 mmol/ 

Magnesium Sulfate 8 meq/Calcium Gluconate 15 meq/ Multivitamins 10 ml/Chromium/ 

Copper/Manganese/ Seleni/Zn 0.5 ml/ Insulin Human Regular 20 unit/ Total 

Parenteral Nutrition/Amino Acids/Dextrose/ Fat Emulsion Intravenous 1,400 ml @  

58.333 mls/ hr TPN  CONT IV  Last administered on 3/31/20at 21:36;  Start 3/31/2

0 at 22:00;  Stop 4/1/20 at 21:59;  Status DC


Alteplase, Recombinant (Cathflo For Central Catheter Clearance) 1 mg 1X  ONCE 

INT CAT  Last administered on 3/31/20at 20:03;  Start 3/31/20 at 19:30;  Stop 

3/31/20 at 19:46;  Status DC


Alteplase, Recombinant (Cathflo For Central Catheter Clearance) 1 mg 1X  ONCE 

INT CAT  Last administered on 3/31/20at 22:05;  Start 3/31/20 at 22:00;  Stop 

3/31/20 at 22:01;  Status DC


Sodium Chloride 90 meq/Potassium Chloride 15 meq/ Potassium Phosphate 18 mmol/ 

Magnesium Sulfate 8 meq/Calcium Gluconate 15 meq/ Multivitamins 10 ml/Chromium/ 

Copper/Manganese/ Seleni/Zn 0.5 ml/ Insulin Human Regular 20 unit/ Total 

Parenteral Nutrition/Amino Acids/Dextrose/ Fat Emulsion Intravenous 1,400 ml @  

58.333 mls/ hr TPN  CONT IV  Last administered on 4/1/20at 21:30;  Start 4/1/20 

at 22:00;  Stop 4/2/20 at 21:59;  Status DC


Dexmedetomidine HCl 400 mcg/ Sodium Chloride 100 ml @ 0 mls/hr CONT  PRN IV 

ANXIETY / AGITATION Last administered on 5/30/20at 12:57;  Start 4/2/20 at 

08:15;  Stop 5/30/20 at 18:31;  Status DC


Sodium Chloride 500 ml @  500 mls/hr 1X PRN  PRN IV ELEVATED BP, SEE COMMENTS;  

Start 4/2/20 at 08:15


Atropine Sulfate (ATROPINE 0.5mg SYRINGE) 0.5 mg PRN Q5MIN  PRN IV SEE COMMENTS;

 Start 4/2/20 at 08:15


Furosemide (Lasix) 20 mg 1X  ONCE IVP  Last administered on 4/2/20at 08:19;  

Start 4/2/20 at 08:15;  Stop 4/2/20 at 08:16;  Status DC


Lidocaine HCl (Buffered Lidocaine 1%) 3 ml STK-MED ONCE .ROUTE ;  Start 4/2/20 

at 08:39;  Stop 4/2/20 at 08:39;  Status DC


Lidocaine HCl (Buffered Lidocaine 1%) 6 ml 1X  ONCE INJ  Last administered on 

4/2/20at 09:05;  Start 4/2/20 at 09:00;  Stop 4/2/20 at 09:06;  Status DC


Sodium Chloride 90 meq/Potassium Chloride 15 meq/ Potassium Phosphate 18 mmol/ 

Magnesium Sulfate 8 meq/Calcium Gluconate 15 meq/ Multivitamins 10 ml/Chromium/ 

Copper/Manganese/ Seleni/Zn 0.5 ml/ Insulin Human Regular 20 unit/ Total 

Parenteral Nutrition/Amino Acids/Dextrose/ Fat Emulsion Intravenous 1,400 ml @  

58.333 mls/ hr TPN  CONT IV  Last administered on 4/2/20at 22:45;  Start 4/2/20 

at 22:00;  Stop 4/3/20 at 21:59;  Status DC


Sodium Chloride 1,000 ml @  1,000 mls/hr Q1H PRN IV hypotension;  Start 4/3/20 a

t 07:30;  Stop 4/3/20 at 13:29;  Status DC


Albumin Human 200 ml @  200 mls/hr 1X PRN  PRN IV Hypotension Last administered 

on 4/3/20at 09:36;  Start 4/3/20 at 07:30;  Stop 4/3/20 at 13:29;  Status DC


Sodium Chloride (Normal Saline Flush) 10 ml 1X PRN  PRN IV AP catheter pack;  

Start 4/3/20 at 07:30;  Stop 4/3/20 at 21:29;  Status DC


Sodium Chloride (Normal Saline Flush) 10 ml 1X PRN  PRN IV  catheter pack;  

Start 4/3/20 at 07:30;  Stop 4/4/20 at 07:29;  Status DC


Sodium Chloride 1,000 ml @  400 mls/hr Q2H30M PRN IV PATENCY;  Start 4/3/20 at 

07:30;  Stop 4/3/20 at 19:29;  Status DC


Info (PHARMACY MONITORING -- do not chart) 1 each PRN DAILY  PRN MC SEE 

COMMENTS;  Start 4/3/20 at 07:30;  Stop 4/3/20 at 13:02;  Status DC


Info (PHARMACY MONITORING -- do not chart) 1 each PRN DAILY  PRN MC SEE 

COMMENTS;  Start 4/3/20 at 07:30;  Stop 4/5/20 at 12:45;  Status DC


Sodium Chloride 90 meq/Potassium Chloride 15 meq/ Potassium Phosphate 10 mmol/ 

Magnesium Sulfate 8 meq/Calcium Gluconate 15 meq/ Multivitamins 10 ml/Chromium/ 

Copper/Manganese/ Seleni/Zn 0.5 ml/ Insulin Human Regular 25 unit/ Total 

Parenteral Nutrition/Amino Acids/Dextrose/ Fat Emulsion Intravenous 1,400 ml @  

58.333 mls/ hr TPN  CONT IV  Last administered on 4/3/20at 22:19;  Start 4/3/20 

at 22:00;  Stop 4/4/20 at 21:59;  Status DC


Heparin Sodium (Porcine) (Heparin Sodium) 5,000 unit Q12HR SQ  Last administered

on 4/26/20at 08:59;  Start 4/3/20 at 21:00;  Stop 4/26/20 at 10:05;  Status DC


Ondansetron HCl (Zofran) 4 mg PRN Q6HRS  PRN IV NAUSEA/VOMITING;  Start 4/6/20 

at 07:00;  Stop 4/7/20 at 06:59;  Status DC


Fentanyl Citrate (Fentanyl 2ml Vial) 25 mcg PRN Q5MIN  PRN IV MILD PAIN 1-3;  

Start 4/6/20 at 07:00;  Stop 4/7/20 at 06:59;  Status DC


Fentanyl Citrate (Fentanyl 2ml Vial) 50 mcg PRN Q5MIN  PRN IV MODERATE TO SEVERE

PAIN;  Start 4/6/20 at 07:00;  Stop 4/7/20 at 06:59;  Status DC


Ringer's Solution 1,000 ml @  30 mls/hr Q24H IV ;  Start 4/6/20 at 07:00;  Stop 

4/6/20 at 18:59;  Status DC


Lidocaine HCl (Xylocaine-Mpf 1% 2ml Vial) 2 ml PRN 1X  PRN ID PRIOR TO IV START;

 Start 4/6/20 at 07:00;  Stop 4/7/20 at 06:59;  Status DC


Prochlorperazine Edisylate (Compazine) 5 mg PACU PRN  PRN IV NAUSEA, MRX1;  

Start 4/6/20 at 07:00;  Stop 4/7/20 at 06:59;  Status DC


Sodium Chloride 1,000 ml @  1,000 mls/hr Q1H PRN IV hypotension;  Start 4/4/20 

at 09:10;  Stop 4/4/20 at 15:09;  Status DC


Albumin Human 200 ml @  200 mls/hr 1X PRN  PRN IV Hypotension Last administered 

on 4/4/20at 10:10;  Start 4/4/20 at 09:15;  Stop 4/4/20 at 15:14;  Status DC


Sodium Chloride 1,000 ml @  400 mls/hr Q2H30M PRN IV PATENCY;  Start 4/4/20 at 

09:10;  Stop 4/4/20 at 21:09;  Status DC


Info (PHARMACY MONITORING -- do not chart) 1 each PRN DAILY  PRN MC SEE 

COMMENTS;  Start 4/4/20 at 09:15;  Stop 4/5/20 at 12:45;  Status DC


Info (PHARMACY MONITORING -- do not chart) 1 each PRN DAILY  PRN MC SEE 

COMMENTS;  Start 4/4/20 at 09:15;  Stop 4/5/20 at 12:45;  Status DC


Sodium Chloride 90 meq/Potassium Chloride 15 meq/ Potassium Phosphate 10 mmol/ 

Magnesium Sulfate 8 meq/Calcium Gluconate 15 meq/ Multivitamins 10 ml/Chromium/ 

Copper/Manganese/ Seleni/Zn 0.5 ml/ Insulin Human Regular 25 unit/ Total 

Parenteral Nutrition/Amino Acids/Dextrose/ Fat Emulsion Intravenous 1,400 ml @  

58.333 mls/ hr TPN  CONT IV  Last administered on 4/4/20at 22:10;  Start 4/4/20 

at 22:00;  Stop 4/5/20 at 21:59;  Status DC


Magnesium Sulfate 50 ml @ 25 mls/hr PRN DAILY  PRN IV for Mag < 1.7 on am labs 

Last administered on 6/18/20at 10:57;  Start 4/5/20 at 09:15


Sodium Chloride 90 meq/Potassium Chloride 15 meq/ Potassium Phosphate 10 mmol/ 

Magnesium Sulfate 8 meq/Calcium Gluconate 15 meq/ Multivitamins 10 ml/Chromium/ 

Copper/Manganese/ Seleni/Zn 0.5 ml/ Insulin Human Regular 25 unit/ Total 

Parenteral Nutrition/Amino Acids/Dextrose/ Fat Emulsion Intravenous 1,400 ml @  

58.333 mls/ hr TPN  CONT IV  Last administered on 4/5/20at 21:20;  Start 4/5/20 

at 22:00;  Stop 4/6/20 at 21:59;  Status DC


Sodium Chloride 1,000 ml @  1,000 mls/hr Q1H PRN IV hypotension;  Start 4/5/20 

at 12:23;  Stop 4/5/20 at 18:22;  Status DC


Albumin Human 200 ml @  200 mls/hr 1X  ONCE IV  Last administered on 4/5/20at 

13:34;  Start 4/5/20 at 12:30;  Stop 4/5/20 at 13:29;  Status DC


Diphenhydramine HCl (Benadryl) 25 mg 1X PRN  PRN IV ITCHING;  Start 4/5/20 at 

12:30;  Stop 4/6/20 at 12:29;  Status DC


Diphenhydramine HCl (Benadryl) 25 mg 1X PRN  PRN IV ITCHING;  Start 4/5/20 at 

12:30;  Stop 4/6/20 at 12:29;  Status DC


Info (PHARMACY MONITORING -- do not chart) 1 each PRN DAILY  PRN MC SEE 

COMMENTS;  Start 4/5/20 at 12:30;  Status Cancel


Bupivacaine HCl/ Epinephrine Bitart (Sensorcain-Epi 0.5%-1:310399 Mpf) 30 ml 

STK-MED ONCE .ROUTE  Last administered on 4/6/20at 11:44;  Start 4/6/20 at 

11:00;  Stop 4/6/20 at 11:01;  Status DC


Cellulose (Surgicel Fibrillar 1x2) 1 each STK-MED ONCE .ROUTE ;  Start 4/6/20 at

11:00;  Stop 4/6/20 at 11:01;  Status DC


Sodium Chloride 90 meq/Potassium Chloride 15 meq/ Potassium Phosphate 10 mmol/ 

Magnesium Sulfate 12 meq/Calcium Gluconate 15 meq/ Multivitamins 10 ml/Chromium/

Copper/Manganese/ Seleni/Zn 0.5 ml/ Insulin Human Regular 25 unit/ Total Parent

eral Nutrition/Amino Acids/Dextrose/ Fat Emulsion Intravenous 1,400 ml @  58.333

mls/ hr TPN  CONT IV  Last administered on 4/6/20at 22:24;  Start 4/6/20 at 

22:00;  Stop 4/7/20 at 21:59;  Status DC


Propofol 20 ml @ As Directed STK-MED ONCE IV ;  Start 4/6/20 at 11:07;  Stop 

4/6/20 at 11:07;  Status DC


Cellulose (Surgicel Hemostat 4x8) 1 each STK-MED ONCE .ROUTE  Last administered 

on 4/6/20at 11:44;  Start 4/6/20 at 11:55;  Stop 4/6/20 at 11:56;  Status DC


Sevoflurane (Ultane) 60 ml STK-MED ONCE IH ;  Start 4/6/20 at 12:46;  Stop 

4/6/20 at 12:46;  Status DC


Sodium Chloride 1,000 ml @  1,000 mls/hr Q1H PRN IV hypotension;  Start 4/6/20 

at 13:51;  Stop 4/6/20 at 19:50;  Status DC


Albumin Human 200 ml @  200 mls/hr 1X PRN  PRN IV Hypotension Last administered 

on 4/6/20at 14:51;  Start 4/6/20 at 14:00;  Stop 4/6/20 at 19:59;  Status DC


Diphenhydramine HCl (Benadryl) 25 mg 1X PRN  PRN IV ITCHING;  Start 4/6/20 at 

14:00;  Stop 4/7/20 at 13:59;  Status DC


Diphenhydramine HCl (Benadryl) 25 mg 1X PRN  PRN IV ITCHING;  Start 4/6/20 at 

14:00;  Stop 4/7/20 at 13:59;  Status DC


Sodium Chloride 1,000 ml @  400 mls/hr Q2H30M PRN IV PATENCY;  Start 4/6/20 at 

13:51;  Stop 4/7/20 at 01:50;  Status DC


Info (PHARMACY MONITORING -- do not chart) 1 each PRN DAILY  PRN MC SEE COMMENT

S;  Start 4/6/20 at 14:00;  Stop 4/9/20 at 08:16;  Status DC


Heparin Sodium (Porcine) (Hep Lock Adult) 500 unit STK-MED ONCE IVP ;  Start 

4/7/20 at 09:29;  Stop 4/7/20 at 09:30;  Status DC


Sodium Chloride 1,000 ml @  1,000 mls/hr Q1H PRN IV hypotension;  Start 4/7/20 

at 10:43;  Stop 4/7/20 at 16:42;  Status DC


Sodium Chloride 1,000 ml @  400 mls/hr Q2H30M PRN IV PATENCY;  Start 4/7/20 at 

10:43;  Stop 4/7/20 at 22:42;  Status DC


Info (PHARMACY MONITORING -- do not chart) 1 each PRN DAILY  PRN MC SEE COMMENTS

;  Start 4/7/20 at 10:45;  Status UNV


Info (PHARMACY MONITORING -- do not chart) 1 each PRN DAILY  PRN MC SEE 

COMMENTS;  Start 4/7/20 at 10:45;  Status UNV


Sodium Chloride 90 meq/Potassium Chloride 15 meq/ Magnesium Sulfate 12 

meq/Calcium Gluconate 15 meq/ Multivitamins 10 ml/Chromium/ Copper/Manganese/ 

Seleni/Zn 0.5 ml/ Insulin Human Regular 25 unit/ Total Parenteral Nutrition/Ami

no Acids/Dextrose/ Fat Emulsion Intravenous 1,400 ml @  58.333 mls/ hr TPN  CONT

IV  Last administered on 4/7/20at 22:13;  Start 4/7/20 at 22:00;  Stop 4/8/20 at

21:59;  Status DC


Sodium Chloride 1,000 ml @  1,000 mls/hr Q1H PRN IV hypotension;  Start 4/8/20 

at 07:50;  Stop 4/8/20 at 13:49;  Status DC


Albumin Human 200 ml @  200 mls/hr 1X  ONCE IV ;  Start 4/8/20 at 08:00;  Stop 

4/8/20 at 08:53;  Status DC


Diphenhydramine HCl (Benadryl) 25 mg 1X PRN  PRN IV ITCHING;  Start 4/8/20 at 

08:00;  Stop 4/9/20 at 07:59;  Status DC


Diphenhydramine HCl (Benadryl) 25 mg 1X PRN  PRN IV ITCHING;  Start 4/8/20 at 

08:00;  Stop 4/9/20 at 07:59;  Status DC


Info (PHARMACY MONITORING -- do not chart) 1 each PRN DAILY  PRN MC SEE 

COMMENTS;  Start 4/8/20 at 08:00;  Stop 4/9/20 at 08:16;  Status DC


Albumin Human 50 ml @ 50 mls/hr 1X  ONCE IV ;  Start 4/8/20 at 08:53;  Stop 

4/8/20 at 08:56;  Status DC


Albumin Human 200 ml @  50 mls/hr PRN 1X  PRN IV HYPOTENSION Last administered 

on 4/14/20at 11:54;  Start 4/8/20 at 09:00;  Stop 5/21/20 at 11:14;  Status DC


Meropenem 500 mg/ Sodium Chloride 50 ml @  100 mls/hr Q12H IV  Last administered

on 4/28/20at 10:45;  Start 4/8/20 at 10:00;  Stop 4/28/20 at 12:37;  Status DC


Sodium Chloride 90 meq/Magnesium Sulfate 12 meq/ Calcium Gluconate 15 meq/ 

Multivitamins 10 ml/Chromium/ Copper/Manganese/ Seleni/Zn 0.5 ml/ Insulin Human 

Regular 25 unit/ Total Parenteral Nutrition/Amino Acids/Dextrose/ Fat Emulsion 

Intravenous 1,400 ml @  58.333 mls/ hr TPN  CONT IV  Last administered on 

4/8/20at 21:41;  Start 4/8/20 at 22:00;  Stop 4/9/20 at 21:59;  Status DC


Sodium Chloride 1,000 ml @  1,000 mls/hr Q1H PRN IV hypotension;  Start 4/9/20 

at 07:58;  Stop 4/9/20 at 13:57;  Status DC


Albumin Human 200 ml @  200 mls/hr 1X PRN  PRN IV Hypotension Last administered 

on 4/9/20at 09:30;  Start 4/9/20 at 08:00;  Stop 4/9/20 at 13:59;  Status DC


Sodium Chloride 1,000 ml @  400 mls/hr Q2H30M PRN IV PATENCY;  Start 4/9/20 at 

07:58;  Stop 4/9/20 at 19:57;  Status DC


Info (PHARMACY MONITORING -- do not chart) 1 each PRN DAILY  PRN MC SEE 

COMMENTS;  Start 4/9/20 at 08:00;  Status Cancel


Info (PHARMACY MONITORING -- do not chart) 1 each PRN DAILY  PRN MC SEE 

COMMENTS;  Start 4/9/20 at 08:15;  Status UNV


Sodium Chloride 90 meq/Potassium Phosphate 5 mmol/ Magnesium Sulfate 12 m

eq/Calcium Gluconate 15 meq/ Multivitamins 10 ml/Chromium/ Copper/Manganese/ 

Seleni/Zn 0.5 ml/ Insulin Human Regular 30 unit/ Total Parenteral 

Nutrition/Amino Acids/Dextrose/ Fat Emulsion Intravenous 1,400 ml @  58.333 mls/

hr TPN  CONT IV  Last administered on 4/9/20at 22:08;  Start 4/9/20 at 22:00;  

Stop 4/10/20 at 21:59;  Status DC


Linezolid/Dextrose 300 ml @  300 mls/hr Q12HR IV  Last administered on 4/20/20at

20:40;  Start 4/10/20 at 11:00;  Stop 4/21/20 at 08:10;  Status DC


Sodium Chloride 90 meq/Potassium Phosphate 15 mmol/ Magnesium Sulfate 12 

meq/Calcium Gluconate 15 meq/ Multivitamins 10 ml/Chromium/ Copper/Manganese/ 

Seleni/Zn 0.5 ml/ Insulin Human Regular 30 unit/ Total Parenteral 

Nutrition/Amino Acids/Dextrose/ Fat Emulsion Intravenous 1,400 ml @  58.333 mls/

hr TPN  CONT IV  Last administered on 4/10/20at 21:49;  Start 4/10/20 at 22:00; 

Stop 4/11/20 at 21:59;  Status DC


Sodium Chloride 90 meq/Potassium Phosphate 15 mmol/ Magnesium Sulfate 12 

meq/Calcium Gluconate 15 meq/ Multivitamins 10 ml/Chromium/ Copper/Manganese/ 

Seleni/Zn 0.5 ml/ Insulin Human Regular 40 unit/ Total Parenteral 

Nutrition/Amino Acids/Dextrose/ Fat Emulsion Intravenous 1,400 ml @  58.333 mls/

hr TPN  CONT IV  Last administered on 4/11/20at 21:21;  Start 4/11/20 at 22:00; 

Stop 4/12/20 at 21:59;  Status DC


Sodium Chloride 1,000 ml @  1,000 mls/hr Q1H PRN IV hypotension;  Start 4/11/20 

at 13:26;  Stop 4/11/20 at 19:25;  Status DC


Albumin Human 200 ml @  200 mls/hr 1X PRN  PRN IV Hypotension Last administered 

on 4/11/20at 15:00;  Start 4/11/20 at 13:30;  Stop 4/11/20 at 19:29;  Status DC


Sodium Chloride (Normal Saline Flush) 10 ml 1X PRN  PRN IV AP catheter pack;  

Start 4/11/20 at 13:30;  Stop 4/12/20 at 13:29;  Status DC


Sodium Chloride (Normal Saline Flush) 10 ml 1X PRN  PRN IV  catheter pack;  

Start 4/11/20 at 13:30;  Stop 4/12/20 at 13:29;  Status DC


Sodium Chloride 1,000 ml @  400 mls/hr Q2H30M PRN IV PATENCY;  Start 4/11/20 at 

13:26;  Stop 4/12/20 at 01:25;  Status DC


Info (PHARMACY MONITORING -- do not chart) 1 each PRN DAILY  PRN MC SEE 

COMMENTS;  Start 4/11/20 at 13:30;  Stop 4/11/20 at 13:33;  Status DC


Info (PHARMACY MONITORING -- do not chart) 1 each PRN DAILY  PRN MC SEE 

COMMENTS;  Start 4/11/20 at 13:30;  Stop 4/11/20 at 13:34;  Status DC


Sodium Chloride 90 meq/Potassium Phosphate 19 mmol/ Magnesium Sulfate 12 

meq/Calcium Gluconate 15 meq/ Multivitamins 10 ml/Chromium/ Copper/Manganese/ 

Seleni/Zn 0.5 ml/ Insulin Human Regular 40 unit/ Total Parenteral 

Nutrition/Amino Acids/Dextrose/ Fat Emulsion Intravenous 1,400 ml @  58.333 mls/

hr TPN  CONT IV  Last administered on 4/12/20at 21:54;  Start 4/12/20 at 22:00; 

Stop 4/13/20 at 21:59;  Status DC


Sodium Chloride 1,000 ml @  1,000 mls/hr Q1H PRN IV hypotension;  Start 4/13/20 

at 09:35;  Stop 4/13/20 at 15:34;  Status DC


Albumin Human 200 ml @  200 mls/hr 1X PRN  PRN IV Hypotension;  Start 4/13/20 at

09:45;  Stop 4/13/20 at 15:44;  Status DC


Diphenhydramine HCl (Benadryl) 25 mg 1X PRN  PRN IV ITCHING;  Start 4/13/20 at 

09:45;  Stop 4/14/20 at 09:44;  Status DC


Diphenhydramine HCl (Benadryl) 25 mg 1X PRN  PRN IV ITCHING;  Start 4/13/20 at 

09:45;  Stop 4/14/20 at 09:44;  Status DC


Sodium Chloride 1,000 ml @  400 mls/hr Q2H30M PRN IV PATENCY;  Start 4/13/20 at 

09:35;  Stop 4/13/20 at 21:34;  Status DC


Info (PHARMACY MONITORING -- do not chart) 1 each PRN DAILY  PRN MC SEE 

COMMENTS;  Start 4/13/20 at 09:45;  Status Cancel


Sodium Chloride 100 meq/Potassium Phosphate 19 mmol/ Magnesium Sulfate 12 

meq/Calcium Gluconate 15 meq/ Multivitamins 10 ml/Chromium/ Copper/Manganese/ 

Seleni/Zn 0.5 ml/ Insulin Human Regular 40 unit/ Potassium Chloride 20 meq/ 

Total Parenteral Nutrition/Amino Acids/Dextrose/ Fat Emulsion Intravenous 1,400 

ml @  58.333 mls/ hr TPN  CONT IV  Last administered on 4/13/20at 22:02;  Start 

4/13/20 at 22:00;  Stop 4/14/20 at 21:59;  Status DC


Furosemide (Lasix) 40 mg 1X  ONCE IVP  Last administered on 4/13/20at 14:39;  

Start 4/13/20 at 14:30;  Stop 4/13/20 at 14:31;  Status DC


Metronidazole 100 ml @  100 mls/hr Q8HRS IV  Last administered on 4/21/20at 

06:04;  Start 4/14/20 at 10:00;  Stop 4/21/20 at 08:10;  Status DC


Sodium Chloride 1,000 ml @  1,000 mls/hr Q1H PRN IV hypotension;  Start 4/14/20 

at 08:00;  Stop 4/14/20 at 13:59;  Status DC


Albumin Human 200 ml @  200 mls/hr 1X PRN  PRN IV Hypotension;  Start 4/14/20 at

08:00;  Stop 4/14/20 at 13:59;  Status DC


Sodium Chloride 1,000 ml @  400 mls/hr Q2H30M PRN IV PATENCY;  Start 4/14/20 at 

08:00;  Stop 4/14/20 at 19:59;  Status DC


Info (PHARMACY MONITORING -- do not chart) 1 each PRN DAILY  PRN MC SEE 

COMMENTS;  Start 4/14/20 at 11:30;  Status UNV


Info (PHARMACY MONITORING -- do not chart) 1 each PRN DAILY  PRN MC SEE 

COMMENTS;  Start 4/14/20 at 11:30;  Stop 4/16/20 at 12:13;  Status DC


Sodium Chloride 100 meq/Potassium Phosphate 19 mmol/ Magnesium Sulfate 12 

meq/Calcium Gluconate 15 meq/ Multivitamins 10 ml/Chromium/ Copper/Manganese/ 

Seleni/Zn 0.5 ml/ Insulin Human Regular 40 unit/ Potassium Chloride 20 meq/ 

Total Parenteral Nutrition/Amino Acids/Dextrose/ Fat Emulsion Intravenous 1,400 

ml @  58.333 mls/ hr TPN  CONT IV  Last administered on 4/14/20at 21:52;  Start 

4/14/20 at 22:00;  Stop 4/15/20 at 21:59;  Status DC


Sodium Chloride (Normal Saline Flush) 10 ml QSHIFT  PRN IV AFTER MEDS AND BLOOD 

DRAWS;  Start 4/14/20 at 15:00;  Stop 5/12/20 at 11:27;  Status DC


Sodium Chloride (Normal Saline Flush) 10 ml PRN Q5MIN  PRN IV AFTER MEDS AND 

BLOOD DRAWS;  Start 4/14/20 at 15:00


Sodium Chloride (Normal Saline Flush) 20 ml PRN Q5MIN  PRN IV AFTER MEDS AND 

BLOOD DRAWS;  Start 4/14/20 at 15:00


Sodium Chloride 100 meq/Potassium Phosphate 19 mmol/ Magnesium Sulfate 12 

meq/Calcium Gluconate 15 meq/ Multivitamins 10 ml/Chromium/ Copper/Manganese/ 

Seleni/Zn 0.5 ml/ Insulin Human Regular 40 unit/ Potassium Chloride 20 meq/ 

Total Parenteral Nutrition/Amino Acids/Dextrose/ Fat Emulsion Intravenous 1,400 

ml @  58.333 mls/ hr TPN  CONT IV  Last administered on 4/15/20at 21:20;  Start 

4/15/20 at 22:00;  Stop 4/16/20 at 21:59;  Status DC


Lidocaine HCl (Buffered Lidocaine 1%) 3 ml STK-MED ONCE .ROUTE ;  Start 4/15/20 

at 13:16;  Stop 4/15/20 at 13:16;  Status DC


Lidocaine HCl (Buffered Lidocaine 1%) 6 ml 1X  ONCE INJ  Last administered on 

4/15/20at 13:45;  Start 4/15/20 at 13:30;  Stop 4/15/20 at 13:31;  Status DC


Albumin Human 100 ml @  100 mls/hr 1X  ONCE IV  Last administered on 4/15/20at 

15:41;  Start 4/15/20 at 15:00;  Stop 4/15/20 at 15:59;  Status DC


Albumin Human 50 ml @ 50 mls/hr 1X  ONCE IV  Last administered on 4/15/20at 

15:00;  Start 4/15/20 at 15:00;  Stop 4/15/20 at 15:59;  Status DC


Info (PHARMACY MONITORING -- do not chart) 1 each PRN DAILY  PRN MC SEE 

COMMENTS;  Start 4/16/20 at 11:30;  Status Cancel


Info (PHARMACY MONITORING -- do not chart) 1 each PRN DAILY  PRN MC SEE 

COMMENTS;  Start 4/16/20 at 11:30;  Status UNV


Sodium Chloride 100 meq/Potassium Phosphate 10 mmol/ Magnesium Sulfate 12 

meq/Calcium Gluconate 15 meq/ Multivitamins 10 ml/Chromium/ Copper/Manganese/ 

Seleni/Zn 0.5 ml/ Insulin Human Regular 35 unit/ Potassium Chloride 20 meq/ 

Total Parenteral Nutrition/Amino Acids/Dextrose/ Fat Emulsion Intravenous 1,400 

ml @  58.333 mls/ hr TPN  CONT IV  Last administered on 4/16/20at 22:10;  Start 

4/16/20 at 22:00;  Stop 4/17/20 at 21:59;  Status DC


Sodium Chloride 100 meq/Potassium Phosphate 5 mmol/ Magnesium Sulfate 12 

meq/Calcium Gluconate 15 meq/ Multivitamins 10 ml/Chromium/ Copper/Manganese/ 

Seleni/Zn 0.5 ml/ Insulin Human Regular 35 unit/ Potassium Chloride 20 meq/ 

Total Parenteral Nutrition/Amino Acids/Dextrose/ Fat Emulsion Intravenous 1,400 

ml @  58.333 mls/ hr TPN  CONT IV  Last administered on 4/17/20at 22:59;  Start 

4/17/20 at 22:00;  Stop 4/18/20 at 21:59;  Status DC


Sodium Chloride 1,000 ml @  1,000 mls/hr Q1H PRN IV hypotension;  Start 4/18/20 

at 08:27;  Stop 4/18/20 at 14:26;  Status DC


Albumin Human 200 ml @  200 mls/hr 1X PRN  PRN IV Hypotension Last administered 

on 4/18/20at 09:18;  Start 4/18/20 at 08:30;  Stop 4/18/20 at 14:29;  Status DC


Sodium Chloride 1,000 ml @  400 mls/hr Q2H30M PRN IV PATENCY;  Start 4/18/20 at 

08:27;  Stop 4/18/20 at 20:26;  Status DC


Info (PHARMACY MONITORING -- do not chart) 1 each PRN DAILY  PRN MC SEE 

COMMENTS;  Start 4/18/20 at 08:30;  Status Cancel


Info (PHARMACY MONITORING -- do not chart) 1 each PRN DAILY  PRN MC SEE 

COMMENTS;  Start 4/18/20 at 08:30;  Stop 4/26/20 at 13:10;  Status DC


Sodium Chloride 100 meq/Potassium Chloride 40 meq/ Magnesium Sulfate 15 

meq/Calcium Gluconate 15 meq/ Multivitamins 10 ml/Chromium/ Copper/Manganese/ 

Seleni/Zn 0.5 ml/ Insulin Human Regular 35 unit/ Total Parenteral 

Nutrition/Amino Acids/Dextrose/ Fat Emulsion Intravenous 1,400 ml @  58.333 mls/

hr TPN  CONT IV  Last administered on 4/18/20at 22:00;  Start 4/18/20 at 22:00; 

Stop 4/19/20 at 21:59;  Status DC


Potassium Chloride/Water 100 ml @  100 mls/hr 1X  ONCE IV  Last administered on 

4/18/20at 17:28;  Start 4/18/20 at 14:45;  Stop 4/18/20 at 15:44;  Status DC


Sodium Chloride 100 meq/Potassium Chloride 40 meq/ Magnesium Sulfate 15 

meq/Calcium Gluconate 15 meq/ Multivitamins 10 ml/Chromium/ Copper/Manganese/ 

Seleni/Zn 0.5 ml/ Insulin Human Regular 35 unit/ Total Parenteral 

Nutrition/Amino Acids/Dextrose/ Fat Emulsion Intravenous 1,400 ml @  58.333 mls/

hr TPN  CONT IV  Last administered on 4/19/20at 22:46;  Start 4/19/20 at 22:00; 

Stop 4/20/20 at 21:59;  Status DC


Sodium Chloride 100 meq/Potassium Chloride 40 meq/ Magnesium Sulfate 20 

meq/Calcium Gluconate 15 meq/ Multivitamins 10 ml/Chromium/ Copper/Manganese/ 

Seleni/Zn 0.5 ml/ Insulin Human Regular 35 unit/ Total Parenteral Nutrition

/Amino Acids/Dextrose/ Fat Emulsion Intravenous 1,400 ml @  58.333 mls/ hr TPN  

CONT IV  Last administered on 4/20/20at 22:31;  Start 4/20/20 at 22:00;  Stop 

4/21/20 at 21:59;  Status DC


Fentanyl Citrate (Fentanyl 2ml Vial) 50 mcg PRN Q2HR  PRN IVP PAIN Last 

administered on 4/27/20at 13:32;  Start 4/20/20 at 21:00;  Stop 4/28/20 at 

12:53;  Status DC


Fentanyl Citrate (Fentanyl 2ml Vial) 25 mcg PRN Q2HR  PRN IVP PAIN;  Start 

4/20/20 at 21:00;  Stop 4/28/20 at 12:54;  Status DC


Enoxaparin Sodium (Lovenox 100mg Syringe) 100 mg Q12HR SQ ;  Start 4/21/20 at 

21:00;  Status UNV


Amino Acids/ Glycerin/ Electrolytes 1,000 ml @  75 mls/hr [id number] IV ;  Start 

4/20/20 at 21:15;  Status UNV


Sodium Chloride 1,000 ml @  1,000 mls/hr Q1H PRN IV hypotension;  Start 4/21/20 

at 07:56;  Stop 4/21/20 at 13:55;  Status DC


Albumin Human 200 ml @  200 mls/hr 1X PRN  PRN IV Hypotension Last administered 

on 4/21/20at 08:40;  Start 4/21/20 at 08:00;  Stop 4/21/20 at 13:59;  Status DC


Sodium Chloride 1,000 ml @  400 mls/hr Q2H30M PRN IV PATENCY;  Start 4/21/20 at 

07:56;  Stop 4/21/20 at 19:55;  Status DC


Info (PHARMACY MONITORING -- do not chart) 1 each PRN DAILY  PRN MC SEE 

COMMENTS;  Start 4/21/20 at 08:00;  Status UNV


Info (PHARMACY MONITORING -- do not chart) 1 each PRN DAILY  PRN MC SEE 

COMMENTS;  Start 4/21/20 at 08:00;  Status UNV


Daptomycin 430 mg/ Sodium Chloride 50 ml @  100 mls/hr Q24H IV  Last administe

red on 4/21/20at 12:35;  Start 4/21/20 at 09:00;  Stop 4/21/20 at 12:49;  Status

DC


Sodium Chloride 100 meq/Potassium Chloride 40 meq/ Magnesium Sulfate 20 

meq/Calcium Gluconate 15 meq/ Multivitamins 10 ml/Chromium/ Copper/Manganese/ 

Seleni/Zn 0.5 ml/ Insulin Human Regular 35 unit/ Total Parenteral 

Nutrition/Amino Acids/Dextrose/ Fat Emulsion Intravenous 1,400 ml @  58.333 mls/

hr TPN  CONT IV  Last administered on 4/21/20at 21:26;  Start 4/21/20 at 22:00; 

Stop 4/22/20 at 21:59;  Status DC


Daptomycin 430 mg/ Sodium Chloride 50 ml @  100 mls/hr Q48H IV ;  Start 4/23/20 

at 09:00;  Stop 4/22/20 at 11:55;  Status DC


Sodium Chloride 100 meq/Potassium Chloride 40 meq/ Magnesium Sulfate 20 

meq/Calcium Gluconate 15 meq/ Multivitamins 10 ml/Chromium/ Copper/Manganese/ 

Seleni/Zn 0.5 ml/ Insulin Human Regular 35 unit/ Total Parenteral 

Nutrition/Amino Acids/Dextrose/ Fat Emulsion Intravenous 1,400 ml @  58.333 mls/

hr TPN  CONT IV  Last administered on 4/22/20at 22:27;  Start 4/22/20 at 22:00; 

Stop 4/23/20 at 21:59;  Status DC


Daptomycin 430 mg/ Sodium Chloride 50 ml @  100 mls/hr Q24H IV  Last 

administered on 4/24/20at 15:07;  Start 4/22/20 at 13:00;  Stop 4/25/20 at 

13:15;  Status DC


Sodium Chloride 100 meq/Potassium Chloride 40 meq/ Magnesium Sulfate 20 

meq/Calcium Gluconate 10 meq/ Multivitamins 10 ml/Chromium/ Copper/Manganese/ 

Seleni/Zn 0.5 ml/ Insulin Human Regular 35 unit/ Total Parenteral 

Nutrition/Amino Acids/Dextrose/ Fat Emulsion Intravenous 1,400 ml @  58.333 mls/

hr TPN  CONT IV  Last administered on 4/24/20at 00:06;  Start 4/23/20 at 22:00; 

Stop 4/24/20 at 21:59;  Status DC


Alteplase, Recombinant (Cathflo For Central Catheter Clearance) 1 mg 1X  ONCE 

INT CAT  Last administered on 4/24/20at 11:44;  Start 4/24/20 at 10:45;  Stop 

4/24/20 at 10:46;  Status DC


Ondansetron HCl (Zofran) 4 mg PRN Q6HRS  PRN IV NAUSEA/VOMITING;  Start 4/27/20 

at 07:00;  Stop 4/28/20 at 06:59;  Status DC


Fentanyl Citrate (Fentanyl 2ml Vial) 25 mcg PRN Q5MIN  PRN IV MILD PAIN 1-3;  

Start 4/27/20 at 07:00;  Stop 4/28/20 at 06:59;  Status DC


Fentanyl Citrate (Fentanyl 2ml Vial) 50 mcg PRN Q5MIN  PRN IV MODERATE TO SEVERE

PAIN Last administered on 4/27/20at 10:17;  Start 4/27/20 at 07:00;  Stop 

4/28/20 at 06:59;  Status DC


Ringer's Solution 1,000 ml @  30 mls/hr Q24H IV ;  Start 4/27/20 at 07:00;  Stop

4/27/20 at 18:59;  Status DC


Lidocaine HCl (Xylocaine-Mpf 1% 2ml Vial) 2 ml PRN 1X  PRN ID PRIOR TO IV START;

 Start 4/27/20 at 07:00;  Stop 4/28/20 at 06:59;  Status DC


Prochlorperazine Edisylate (Compazine) 5 mg PACU PRN  PRN IV NAUSEA, MRX1;  

Start 4/27/20 at 07:00;  Stop 4/28/20 at 06:59;  Status DC


Sodium Acetate 50 meq/Potassium Acetate 55 meq/ Magnesium Sulfate 20 meq/Calcium

Gluconate 10 meq/ Multivitamins 10 ml/Chromium/ Copper/Manganese/ Seleni/Zn 0.5 

ml/ Insulin Human Regular 35 unit/ Total Parenteral Nutrition/Amino 

Acids/Dextrose/ Fat Emulsion Intravenous 1,400 ml @  58.333 mls/ hr TPN  CONT IV

;  Start 4/24/20 at 22:00;  Stop 4/24/20 at 14:15;  Status DC


Sodium Acetate 50 meq/Potassium Acetate 55 meq/ Magnesium Sulfate 20 meq/Calcium

Gluconate 10 meq/ Multivitamins 10 ml/Chromium/ Copper/Manganese/ Seleni/Zn 0.5 

ml/ Insulin Human Regular 35 unit/ Total Parenteral Nutrition/Amino 

Acids/Dextrose/ Fat Emulsion Intravenous 1,800 ml @  75 mls/hr TPN  CONT IV  

Last administered on 4/24/20at 22:38;  Start 4/24/20 at 22:00;  Stop 4/25/20 at 

21:59;  Status DC


Sodium Chloride 1,000 ml @  1,000 mls/hr Q1H PRN IV hypotension;  Start 4/24/20 

at 15:31;  Stop 4/24/20 at 21:30;  Status DC


Diphenhydramine HCl (Benadryl) 25 mg 1X PRN  PRN IV ITCHING;  Start 4/24/20 at 

15:45;  Stop 4/25/20 at 15:44;  Status DC


Diphenhydramine HCl (Benadryl) 25 mg 1X PRN  PRN IV ITCHING;  Start 4/24/20 at 

15:45;  Stop 4/25/20 at 15:44;  Status DC


Sodium Chloride 1,000 ml @  400 mls/hr Q2H30M PRN IV PATENCY;  Start 4/24/20 at 

15:31;  Stop 4/25/20 at 03:30;  Status DC


Info (PHARMACY MONITORING -- do not chart) 1 each PRN DAILY  PRN MC SEE COMMENT

S;  Start 4/24/20 at 15:45;  Stop 5/26/20 at 14:14;  Status DC


Sodium Acetate 50 meq/Potassium Acetate 55 meq/ Magnesium Sulfate 20 meq/Calcium

Gluconate 10 meq/ Multivitamins 10 ml/Chromium/ Copper/Manganese/ Seleni/Zn 0.5 

ml/ Insulin Human Regular 35 unit/ Total Parenteral Nutrition/Amino 

Acids/Dextrose/ Fat Emulsion Intravenous 1,800 ml @  75 mls/hr TPN  CONT IV  

Last administered on 4/25/20at 22:03;  Start 4/25/20 at 22:00;  Stop 4/26/20 at 

21:59;  Status DC


Daptomycin 430 mg/ Sodium Chloride 50 ml @  100 mls/hr Q24H IV  Last 

administered on 4/30/20at 13:00;  Start 4/25/20 at 13:00;  Stop 4/30/20 at 

20:58;  Status DC


Heparin Sodium (Porcine) 1000 unit/Sodium Chloride 1,001 ml @  1,001 mls/hr 1X  

ONCE IRR ;  Start 4/27/20 at 06:00;  Stop 4/27/20 at 06:59;  Status DC


Potassium Acetate 55 meq/Magnesium Sulfate 20 meq/ Calcium Gluconate 10 meq/ 

Multivitamins 10 ml/Chromium/ Copper/Manganese/ Seleni/Zn 0.5 ml/ Insulin Human 

Regular 35 unit/ Total Parenteral Nutrition/Amino Acids/Dextrose/ Fat Emulsion 

Intravenous 1,920 ml @  80 mls/hr TPN  CONT IV  Last administered on 4/26/20at 

22:10;  Start 4/26/20 at 22:00;  Stop 4/27/20 at 21:59;  Status DC


Dexamethasone Sodium Phosphate (Decadron) 4 mg STK-MED ONCE .ROUTE ;  Start 

4/27/20 at 10:56;  Stop 4/27/20 at 10:57;  Status DC


Ondansetron HCl (Zofran) 4 mg STK-MED ONCE .ROUTE ;  Start 4/27/20 at 10:56;  

Stop 4/27/20 at 10:57;  Status DC


Rocuronium Bromide (Zemuron) 50 mg STK-MED ONCE .ROUTE ;  Start 4/27/20 at 

10:56;  Stop 4/27/20 at 10:57;  Status DC


Fentanyl Citrate (Fentanyl 2ml Vial) 100 mcg STK-MED ONCE .ROUTE ;  Start 

4/27/20 at 10:56;  Stop 4/27/20 at 10:57;  Status DC


Bupivacaine HCl/ Epinephrine Bitart (Sensorcain-Epi 0.5%-1:627348 Mpf) 30 ml 

STK-MED ONCE .ROUTE  Last administered on 4/27/20at 12:01;  Start 4/27/20 at 

10:58;  Stop 4/27/20 at 10:58;  Status DC


Cellulose (Surgicel Hemostat 2x14) 1 each STK-MED ONCE .ROUTE ;  Start 4/27/20 

at 10:58;  Stop 4/27/20 at 10:59;  Status DC


Iohexol (Omnipaque 300 Mg/ml) 50 ml STK-MED ONCE .ROUTE ;  Start 4/27/20 at 

10:58;  Stop 4/27/20 at 10:59;  Status DC


Cellulose (Surgicel Hemostat 4x8) 1 each STK-MED ONCE .ROUTE ;  Start 4/27/20 at

10:58;  Stop 4/27/20 at 10:59;  Status DC


Bisacodyl (Dulcolax Supp) 10 mg STK-MED ONCE .ROUTE ;  Start 4/27/20 at 10:59;  

Stop 4/27/20 at 10:59;  Status DC


Heparin Sodium (Porcine) 1000 unit/Sodium Chloride 1,001 ml @  1,001 mls/hr 1X  

ONCE IRR ;  Start 4/27/20 at 12:00;  Stop 4/27/20 at 12:59;  Status DC


Propofol 20 ml @ As Directed STK-MED ONCE IV ;  Start 4/27/20 at 11:05;  Stop 

4/27/20 at 11:05;  Status DC


Sevoflurane (Ultane) 90 ml STK-MED ONCE IH ;  Start 4/27/20 at 11:05;  Stop 

4/27/20 at 11:05;  Status DC


Sevoflurane (Ultane) 60 ml STK-MED ONCE IH ;  Start 4/27/20 at 12:26;  Stop 

4/27/20 at 12:27;  Status DC


Propofol 20 ml @ As Directed STK-MED ONCE IV ;  Start 4/27/20 at 12:26;  Stop 

4/27/20 at 12:27;  Status DC


Phenylephrine HCl (PHENYLEPHRINE in 0.9% NACL PF) 1 mg STK-MED ONCE IV ;  Start 

4/27/20 at 12:34;  Stop 4/27/20 at 12:34;  Status DC


Heparin Sodium (Porcine) (Heparin Sodium) 5,000 unit Q12HR SQ  Last administered

on 5/6/20at 20:57;  Start 4/27/20 at 21:00;  Stop 5/7/20 at 09:59;  Status DC


Sodium Chloride (Normal Saline Flush) 3 ml QSHIFT  PRN IV AFTER MEDS AND BLOOD 

DRAWS;  Start 4/27/20 at 13:45;  Status Cancel


Naloxone HCl (Narcan) 0.4 mg PRN Q2MIN  PRN IV SEE INSTRUCTIONS Last 

administered on 6/6/20at 15:15;  Start 4/27/20 at 13:45;  Stop 7/1/20 at 16:00; 

Status DC


Sodium Chloride 1,000 ml @  25 mls/hr Q24H IV  Last administered on 5/26/20at 

13:37;  Start 4/27/20 at 13:37;  Stop 5/29/20 at 13:09;  Status DC


Naloxone HCl (Narcan) 0.4 mg PRN Q2MIN  PRN IV SEE INSTRUCTIONS;  Start 4/27/20 

at 14:30;  Status UNV


Sodium Chloride 1,000 ml @  25 mls/hr Q24H IV ;  Start 4/27/20 at 14:30;  Status

UNV


Hydromorphone HCl 30 ml @ 0 mls/hr CONT PRN  PRN IV PER PROTOCOL Last 

administered on 5/2/20at 16:08;  Start 4/27/20 at 14:30;  Stop 5/4/20 at 08:55; 

Status DC


Potassium Acetate 55 meq/Magnesium Sulfate 20 meq/ Calcium Gluconate 10 meq/ 

Multivitamins 10 ml/Chromium/ Copper/Manganese/ Seleni/Zn 0.5 ml/ Insulin Human 

Regular 35 unit/ Total Parenteral Nutrition/Amino Acids/Dextrose/ Fat Emulsion 

Intravenous 1,920 ml @  80 mls/hr TPN  CONT IV  Last administered on 4/27/20at 

22:01;  Start 4/27/20 at 22:00;  Stop 4/28/20 at 21:59;  Status DC


Bumetanide (Bumex) 2 mg BID92 IV  Last administered on 5/1/20at 13:50;  Start 

4/28/20 at 14:00;  Stop 5/2/20 at 14:10;  Status DC


Meropenem 1 gm/ Sodium Chloride 100 ml @  200 mls/hr Q8HRS IV  Last administered

on 5/22/20at 05:53;  Start 4/28/20 at 14:00;  Stop 5/22/20 at 09:31;  Status DC


Potassium Acetate 55 meq/Magnesium Sulfate 20 meq/ Calcium Gluconate 10 meq/ 

Multivitamins 10 ml/Chromium/ Copper/Manganese/ Seleni/Zn 0.5 ml/ Insulin Human 

Regular 35 unit/ Total Parenteral Nutrition/Amino Acids/Dextrose/ Fat Emulsion 

Intravenous 1,920 ml @  80 mls/hr TPN  CONT IV  Last administered on 4/28/20at 

22:02;  Start 4/28/20 at 22:00;  Stop 4/29/20 at 21:59;  Status DC


Hydromorphone HCl (Dilaudid Standard PCA) 12 mg STK-MED ONCE IV ;  Start 4/27/20

at 14:35;  Stop 4/28/20 at 13:53;  Status DC


Artificial Tears (Artificial Tears) 1 drop PRN Q15MIN  PRN OU DRY EYE Last 

administered on 6/23/20at 21:17;  Start 4/29/20 at 05:30


Hydromorphone HCl (Dilaudid Standard PCA) 12 mg STK-MED ONCE IV ;  Start 4/28/20

at 12:05;  Stop 4/29/20 at 09:15;  Status DC


Potassium Acetate 65 meq/Magnesium Sulfate 20 meq/ Calcium Gluconate 10 meq/ 

Multivitamins 10 ml/Chromium/ Copper/Manganese/ Seleni/Zn 0.5 ml/ Insulin Human 

Regular 30 unit/ Total Parenteral Nutrition/Amino Acids/Dextrose/ Fat Emulsion 

Intravenous 1,920 ml @  80 mls/hr TPN  CONT IV  Last administered on 4/29/20at 

22:22;  Start 4/29/20 at 22:00;  Stop 4/30/20 at 21:59;  Status DC


Cyclobenzaprine HCl (Flexeril) 10 mg PRN Q6HRS  PRN PO MUSCLE SPASMS Last 

administered on 7/10/20at 19:12;  Start 4/30/20 at 10:45


Potassium Acetate 55 meq/Magnesium Sulfate 20 meq/ Calcium Gluconate 10 meq/ 

Multivitamins 10 ml/Chromium/ Copper/Manganese/ Seleni/Zn 0.5 ml/ Insulin Human 

Regular 30 unit/ Total Parenteral Nutrition/Amino Acids/Dextrose/ Fat Emulsion 

Intravenous 1,920 ml @  80 mls/hr TPN  CONT IV  Last administered on 5/1/20at 

01:00;  Start 4/30/20 at 22:00;  Stop 5/1/20 at 21:59;  Status DC


Magnesium Sulfate 50 ml @ 25 mls/hr 1X  ONCE IV  Last administered on 4/30/20at 

17:18;  Start 4/30/20 at 12:45;  Stop 4/30/20 at 14:44;  Status DC


Potassium Chloride/Water 100 ml @  100 mls/hr 1X  ONCE IV  Last administered on 

5/1/20at 11:27;  Start 5/1/20 at 12:00;  Stop 5/1/20 at 12:59;  Status DC


Hydromorphone HCl (Dilaudid Standard PCA) 12 mg STK-MED ONCE IV ;  Start 4/29/20

at 10:50;  Stop 5/1/20 at 11:02;  Status DC


Hydromorphone HCl (Dilaudid Standard PCA) 12 mg STK-MED ONCE IV ;  Start 4/30/20

at 13:47;  Stop 5/1/20 at 11:03;  Status DC


Potassium Acetate 30 meq/Magnesium Sulfate 20 meq/ Calcium Gluconate 10 meq/ 

Multivitamins 10 ml/Chromium/ Copper/Manganese/ Seleni/Zn 0.5 ml/ Insulin Human 

Regular 30 unit/ Potassium Chloride 30 meq/ Total Parenteral Nutrition/Amino 

Acids/Dextrose/ Fat Emulsion Intravenous 1,920 ml @  80 mls/hr TPN  CONT IV  

Last administered on 5/1/20at 22:34;  Start 5/1/20 at 22:00;  Stop 5/2/20 at 

21:59;  Status DC


Potassium Chloride/Water 100 ml @  100 mls/hr Q1H IV  Last administered on 

5/2/20at 13:05;  Start 5/2/20 at 07:00;  Stop 5/2/20 at 10:59;  Status DC


Magnesium Sulfate 50 ml @ 25 mls/hr 1X  ONCE IV  Last administered on 5/2/20at 

10:34;  Start 5/2/20 at 10:30;  Stop 5/2/20 at 12:29;  Status DC


Potassium Chloride 75 meq/ Magnesium Sulfate 20 meq/Calcium Gluconate 10 meq/ 

Multivitamins 10 ml/Chromium/ Copper/Manganese/ Seleni/Zn 0.5 ml/ Insulin Human 

Regular 30 unit/ Total Parenteral Nutrition/Amino Acids/Dextrose/ Fat Emulsion 

Intravenous 1,920 ml @  80 mls/hr TPN  CONT IV  Last administered on 5/2/20at 

21:51;  Start 5/2/20 at 22:00;  Stop 5/3/20 at 22:00;  Status DC


Potassium Chloride 75 meq/ Magnesium Sulfate 20 meq/Calcium Gluconate 10 meq/ 

Multivitamins 10 ml/Chromium/ Copper/Manganese/ Seleni/Zn 0.5 ml/ Insulin Human 

Regular 25 unit/ Total Parenteral Nutrition/Amino Acids/Dextrose/ Fat Emulsion 

Intravenous 1,920 ml @  80 mls/hr TPN  CONT IV  Last administered on 5/3/20at 

22:04;  Start 5/3/20 at 22:00;  Stop 5/4/20 at 21:59;  Status DC


Hydromorphone HCl (Dilaudid) 0.4 mg PRN Q4HRS  PRN IVP PAIN Last administered on

5/4/20at 10:57;  Start 5/4/20 at 09:00;  Stop 5/4/20 at 18:59;  Status DC


Micafungin Sodium 100 mg/Dextrose 100 ml @  100 mls/hr Q24H IV  Last 

administered on 5/26/20at 12:17;  Start 5/4/20 at 11:00;  Stop 5/27/20 at 09:59;

 Status DC


Daptomycin 485 mg/ Sodium Chloride 50 ml @  100 mls/hr Q24H IV  Last administe

red on 5/11/20at 13:10;  Start 5/4/20 at 11:00;  Stop 5/12/20 at 07:44;  Status 

DC


Potassium Chloride 75 meq/ Magnesium Sulfate 15 meq/Calcium Gluconate 8 meq/ 

Multivitamins 10 ml/Chromium/ Copper/Manganese/ Seleni/Zn 0.5 ml/ Insulin Human 

Regular 25 unit/ Total Parenteral Nutrition/Amino Acids/Dextrose/ Fat Emulsion 

Intravenous 1,920 ml @  80 mls/hr TPN  CONT IV  Last administered on 5/4/20at 

23:08;  Start 5/4/20 at 22:00;  Stop 5/5/20 at 21:59;  Status DC


Haloperidol Lactate (Haldol Inj) 3 mg 1X  ONCE IVP  Last administered on 

5/4/20at 14:37;  Start 5/4/20 at 14:30;  Stop 5/4/20 at 14:31;  Status DC


Hydromorphone HCl (Dilaudid) 1 mg PRN Q4HRS  PRN IVP PAIN Last administered on 

5/18/20at 06:25;  Start 5/4/20 at 19:00;  Stop 5/18/20 at 17:10;  Status DC


Potassium Chloride 75 meq/ Magnesium Sulfate 15 meq/Calcium Gluconate 8 meq/ 

Multivitamins 10 ml/Chromium/ Copper/Manganese/ Seleni/Zn 0.5 ml/ Insulin Human 

Regular 20 unit/ Total Parenteral Nutrition/Amino Acids/Dextrose/ Fat Emulsion 

Intravenous 1,920 ml @  80 mls/hr TPN  CONT IV  Last administered on 5/5/20at 

22:10;  Start 5/5/20 at 22:00;  Stop 5/6/20 at 21:59;  Status DC


Lidocaine HCl (Buffered Lidocaine 1%) 3 ml STK-MED ONCE .ROUTE ;  Start 5/6/20 

at 11:31;  Stop 5/6/20 at 11:31;  Status DC


Lidocaine HCl (Buffered Lidocaine 1%) 3 ml STK-MED ONCE .ROUTE ;  Start 5/6/20 

at 12:28;  Stop 5/6/20 at 12:29;  Status DC


Lidocaine HCl (Buffered Lidocaine 1%) 6 ml 1X  ONCE INJ  Last administered on 

5/6/20at 12:53;  Start 5/6/20 at 12:45;  Stop 5/6/20 at 12:46;  Status DC


Potassium Chloride 75 meq/ Magnesium Sulfate 15 meq/Calcium Gluconate 8 meq/ 

Multivitamins 10 ml/Chromium/ Copper/Manganese/ Seleni/Zn 0.5 ml/ Insulin Human 

Regular 20 unit/ Total Parenteral Nutrition/Amino Acids/Dextrose/ Fat Emulsion 

Intravenous 1,920 ml @  80 mls/hr TPN  CONT IV  Last administered on 5/6/20at 

22:00;  Start 5/6/20 at 22:00;  Stop 5/7/20 at 21:59;  Status DC


Potassium Chloride 75 meq/ Magnesium Sulfate 15 meq/Calcium Gluconate 8 meq/ 

Multivitamins 10 ml/Chromium/ Copper/Manganese/ Seleni/Zn 0.5 ml/ Insulin Human 

Regular 15 unit/ Total Parenteral Nutrition/Amino Acids/Dextrose/ Fat Emulsion 

Intravenous 1,920 ml @  80 mls/hr TPN  CONT IV  Last administered on 5/7/20at 

22:28;  Start 5/7/20 at 22:00;  Stop 5/8/20 at 21:59;  Status DC


Vecuronium Bromide (Norcuron Bolus) 6 mg PRN Q6HRS  PRN IV VENT ASYNCHRONY;  

Start 5/7/20 at 19:15;  Stop 5/7/20 at 19:35;  Status DC


Bumetanide (Bumex) 2 mg 1X  ONCE IV  Last administered on 5/7/20at 22:09;  Start

5/7/20 at 19:45;  Stop 5/7/20 at 19:46;  Status DC


Lidocaine HCl (Buffered Lidocaine 1%) 3 ml STK-MED ONCE .ROUTE ;  Start 5/8/20 

at 07:59;  Stop 5/8/20 at 07:59;  Status DC


Midazolam HCl (Versed) 5 mg STK-MED ONCE .ROUTE ;  Start 5/8/20 at 08:36;  Stop 

5/8/20 at 08:36;  Status DC


Fentanyl Citrate (Fentanyl 5ml Vial) 250 mcg STK-MED ONCE .ROUTE ;  Start 5/8/20

at 08:36;  Stop 5/8/20 at 08:37;  Status DC


Lidocaine HCl (Buffered Lidocaine 1%) 3 ml 1X  ONCE IJ  Last administered on 

5/8/20at 09:30;  Start 5/8/20 at 09:15;  Stop 5/8/20 at 09:16;  Status DC


Midazolam HCl (Versed) 5 mg 1X  ONCE IV  Last administered on 5/8/20at 09:30;  

Start 5/8/20 at 09:15;  Stop 5/8/20 at 09:16;  Status DC


Fentanyl Citrate (Fentanyl 5ml Vial) 250 mcg 1X  ONCE IV  Last administered on 

5/8/20at 09:30;  Start 5/8/20 at 09:15;  Stop 5/8/20 at 09:16;  Status DC


Bumetanide (Bumex) 2 mg DAILY IV  Last administered on 5/18/20at 08:07;  Start 

5/8/20 at 10:00;  Stop 5/18/20 at 17:15;  Status DC


Potassium Chloride 75 meq/ Magnesium Sulfate 15 meq/ Multivitamins 10 

ml/Chromium/ Copper/Manganese/ Seleni/Zn 0.5 ml/ Insulin Human Regular 15 unit/ 

Total Parenteral Nutrition/Amino Acids/Dextrose/ Fat Emulsion Intravenous 1,920 

ml @  80 mls/hr TPN  CONT IV  Last administered on 5/8/20at 21:59;  Start 5/8/20

at 22:00;  Stop 5/9/20 at 21:59;  Status DC


Metoclopramide HCl (Reglan Vial) 10 mg PRN Q3HRS  PRN IVP NAUSEA/VOMITING-3rd 

choice Last administered on 5/14/20at 04:25;  Start 5/9/20 at 16:45


Potassium Chloride 75 meq/ Magnesium Sulfate 15 meq/ Multivitamins 10 

ml/Chromium/ Copper/Manganese/ Seleni/Zn 0.5 ml/ Insulin Human Regular 15 unit/ 

Total Parenteral Nutrition/Amino Acids/Dextrose/ Fat Emulsion Intravenous 1,920 

ml @  80 mls/hr TPN  CONT IV  Last administered on 5/9/20at 22:41;  Start 5/9/20

at 22:00;  Stop 5/10/20 at 21:59;  Status DC


Magnesium Sulfate 50 ml @ 25 mls/hr 1X  ONCE IV  Last administered on 5/10/20at 

10:44;  Start 5/10/20 at 09:00;  Stop 5/10/20 at 10:59;  Status DC


Potassium Chloride/Water 100 ml @  100 mls/hr 1X  ONCE IV  Last administered on 

5/10/20at 09:37;  Start 5/10/20 at 09:00;  Stop 5/10/20 at 09:59;  Status DC


Duloxetine HCl (Cymbalta) 30 mg DAILY PO  Last administered on 5/11/20at 09:48; 

Start 5/10/20 at 14:00;  Stop 5/13/20 at 10:25;  Status DC


Potassium Chloride 80 meq/ Magnesium Sulfate 20 meq/ Multivitamins 10 m

l/Chromium/ Copper/Manganese/ Seleni/Zn 0.5 ml/ Insulin Human Regular 15 unit/ 

Total Parenteral Nutrition/Amino Acids/Dextrose/ Fat Emulsion Intravenous 1,920 

ml @  80 mls/hr TPN  CONT IV  Last administered on 5/10/20at 21:42;  Start 

5/10/20 at 22:00;  Stop 5/11/20 at 21:59;  Status DC


Potassium Chloride 80 meq/ Magnesium Sulfate 20 meq/ Multivitamins 10 

ml/Chromium/ Copper/Manganese/ Seleni/Zn 0.5 ml/ Insulin Human Regular 15 unit/ 

Total Parenteral Nutrition/Amino Acids/Dextrose/ Fat Emulsion Intravenous 1,920 

ml @  80 mls/hr TPN  CONT IV  Last administered on 5/11/20at 22:20;  Start 

5/11/20 at 22:00;  Stop 5/12/20 at 21:59;  Status DC


Lidocaine HCl (Buffered Lidocaine 1%) 3 ml STK-MED ONCE .ROUTE ;  Start 5/12/20 

at 09:54;  Stop 5/12/20 at 09:55;  Status DC


Hydromorphone HCl (Dilaudid Standard PCA) 12 mg STK-MED ONCE IV ;  Start 5/1/20 

at 15:50;  Stop 5/12/20 at 11:24;  Status DC


Potassium Chloride 80 meq/ Magnesium Sulfate 20 meq/ Multivitamins 10 

ml/Chromium/ Copper/Manganese/ Seleni/Zn 0.5 ml/ Insulin Human Regular 15 unit/ 

Total Parenteral Nutrition/Amino Acids/Dextrose/ Fat Emulsion Intravenous 1,920 

ml @  80 mls/hr TPN  CONT IV  Last administered on 5/12/20at 21:40;  Start 

5/12/20 at 22:00;  Stop 5/13/20 at 21:59;  Status DC


Lidocaine HCl (Buffered Lidocaine 1%) 6 ml 1X  ONCE INJ  Last administered on 

5/12/20at 14:15;  Start 5/12/20 at 14:15;  Stop 5/12/20 at 14:16;  Status DC


Potassium Chloride 80 meq/ Magnesium Sulfate 20 meq/ Multivitamins 10 

ml/Chromium/ Copper/Manganese/ Seleni/Zn 1 ml/ Insulin Human Regular 15 unit/ 

Total Parenteral Nutrition/Amino Acids/Dextrose/ Fat Emulsion Intravenous 1,920 

ml @  80 mls/hr TPN  CONT IV  Last administered on 5/13/20at 22:04;  Start 

5/13/20 at 22:00;  Stop 5/14/20 at 21:59;  Status DC


Potassium Chloride/Water 100 ml @  100 mls/hr 1X  ONCE IV  Last administered on 

5/14/20at 11:34;  Start 5/14/20 at 11:00;  Stop 5/14/20 at 11:59;  Status DC


Potassium Chloride 90 meq/ Magnesium Sulfate 20 meq/ Multivitamins 10 

ml/Chromium/ Copper/Manganese/ Seleni/Zn 1 ml/ Insulin Human Regular 15 unit/ 

Total Parenteral Nutrition/Amino Acids/Dextrose/ Fat Emulsion Intravenous 1,920 

ml @  80 mls/hr TPN  CONT IV  Last administered on 5/14/20at 22:57;  Start 

5/14/20 at 22:00;  Stop 5/15/20 at 21:59;  Status DC


Potassium Chloride 90 meq/ Magnesium Sulfate 20 meq/ Multivitamins 10 

ml/Chromium/ Copper/Manganese/ Seleni/Zn 1 ml/ Insulin Human Regular 15 unit/ 

Total Parenteral Nutrition/Amino Acids/Dextrose/ Fat Emulsion Intravenous 1,920 

ml @  80 mls/hr TPN  CONT IV  Last administered on 5/15/20at 22:48;  Start 

5/15/20 at 22:00;  Stop 5/16/20 at 21:59;  Status DC


Potassium Chloride 90 meq/ Magnesium Sulfate 20 meq/ Multivitamins 10 

ml/Chromium/ Copper/Manganese/ Seleni/Zn 1 ml/ Insulin Human Regular 15 unit/ 

Total Parenteral Nutrition/Amino Acids/Dextrose/ Fat Emulsion Intravenous 1,890 

ml @  78.75 mls/ hr TPN  CONT IV  Last administered on 5/16/20at 22:15;  Start 

5/16/20 at 22:00;  Stop 5/17/20 at 21:59;  Status DC


Linezolid/Dextrose 300 ml @  300 mls/hr Q12HR IV  Last administered on 5/19/20at

21:08;  Start 5/17/20 at 09:00;  Stop 5/20/20 at 08:11;  Status DC


Daptomycin 450 mg/ Sodium Chloride 50 ml @  100 mls/hr Q24H IV  Last 

administered on 5/20/20at 09:25;  Start 5/17/20 at 09:00;  Stop 5/21/20 at 

08:30;  Status DC


Potassium Chloride 90 meq/ Magnesium Sulfate 20 meq/ Multivitamins 10 

ml/Chromium/ Copper/Manganese/ Seleni/Zn 1 ml/ Insulin Human Regular 15 unit/ 

Total Parenteral Nutrition/Amino Acids/Dextrose/ Fat Emulsion Intravenous 1,890 

ml @  78.75 mls/ hr TPN  CONT IV  Last administered on 5/17/20at 21:34;  Start 

5/17/20 at 22:00;  Stop 5/18/20 at 21:59;  Status DC


Lorazepam (Ativan Inj) 2 mg STK-MED ONCE .ROUTE ;  Start 5/17/20 at 14:58;  Stop

5/17/20 at 14:58;  Status DC


Metoprolol Tartrate (Lopressor Vial) 5 mg 1X  ONCE IVP  Last administered on 

5/17/20at 15:31;  Start 5/17/20 at 15:15;  Stop 5/17/20 at 15:16;  Status DC


Lorazepam (Ativan Inj) 2 mg 1X  ONCE IVP  Last administered on 5/17/20at 15:30; 

Start 5/17/20 at 15:15;  Stop 5/17/20 at 15:16;  Status DC


Enoxaparin Sodium (Lovenox 40mg Syringe) 40 mg Q24H SQ  Last administered on 

6/5/20at 17:44;  Start 5/17/20 at 17:00;  Stop 6/7/20 at 06:50;  Status DC


Lorazepam (Ativan Inj) 1 mg PRN Q4HRS  PRN IVP ANXIETY / AGITATION MILD-MOD Last

administered on 5/31/20at 15:55;  Start 5/17/20 at 19:15;  Stop 6/2/20 at 11:45;

 Status DC


Lorazepam (Ativan Inj) 2 mg PRN Q4HRS  PRN IVP ANXIETY / AGITATION SEVERE Last 

administered on 6/1/20at 07:55;  Start 5/17/20 at 19:15;  Stop 6/2/20 at 11:45; 

Status DC


Fentanyl Citrate (Fentanyl 2ml Vial) 50 mcg PRN Q4HRS  PRN IVP SEVERE PAIN Last 

administered on 6/13/20at 05:15;  Start 5/18/20 at 13:15;  Stop 6/14/20 at 

09:29;  Status DC


Fentanyl Citrate (Fentanyl 2ml Vial) 25 mcg PRN Q4HRS  PRN IVP MODERATE PAIN 

Last administered on 6/13/20at 00:27;  Start 5/18/20 at 13:15;  Stop 6/14/20 at 

09:30;  Status DC


Potassium Chloride 90 meq/ Magnesium Sulfate 20 meq/ Multivitamins 10 

ml/Chromium/ Copper/Manganese/ Seleni/Zn 1 ml/ Insulin Human Regular 15 unit/ 

Total Parenteral Nutrition/Amino Acids/Dextrose/ Fat Emulsion Intravenous 1,890 

ml @  78.75 mls/ hr TPN  CONT IV  Last administered on 5/18/20at 22:18;  Start 

5/18/20 at 22:00;  Stop 5/19/20 at 21:59;  Status DC


Furosemide (Lasix) 40 mg 1X  ONCE IVP  Last administered on 5/18/20at 21:51;  

Start 5/18/20 at 21:45;  Stop 5/18/20 at 21:48;  Status DC


Albumin Human 100 ml @  100 mls/hr 1X PRN  PRN IV SEE COMMENTS;  Start 5/19/20 

at 01:30


Furosemide (Lasix) 40 mg [id number] IVP  Last administered on 6/3/20at 08:04;  Start 

5/19/20 at 14:00;  Stop 6/3/20 at 13:07;  Status DC


Potassium Chloride 90 meq/ Magnesium Sulfate 20 meq/ Multivitamins 10 

ml/Chromium/ Copper/Manganese/ Seleni/Zn 1 ml/ Insulin Human Regular 15 unit/ 

Total Parenteral Nutrition/Amino Acids/Dextrose/ Fat Emulsion Intravenous 1,800 

ml @  75 mls/hr TPN  CONT IV  Last administered on 5/19/20at 22:31;  Start 

5/19/20 at 22:00;  Stop 5/20/20 at 21:59;  Status DC


Potassium Chloride 90 meq/ Magnesium Sulfate 20 meq/ Multivitamins 10 

ml/Chromium/ Copper/Manganese/ Seleni/Zn 1 ml/ Insulin Human Regular 15 unit/ 

Total Parenteral Nutrition/Amino Acids/Dextrose/ Fat Emulsion Intravenous 1,800 

ml @  75 mls/hr TPN  CONT IV  Last administered on 5/20/20at 22:28;  Start 

5/20/20 at 22:00;  Stop 5/21/20 at 21:59;  Status DC


Potassium Chloride 110 meq/ Magnesium Sulfate 20 meq/ Multivitamins 10 

ml/Chromium/ Copper/Manganese/ Seleni/Zn 1 ml/ Insulin Human Regular 15 unit/ 

Total Parenteral Nutrition/Amino Acids/Dextrose/ Fat Emulsion Intravenous 1,800 

ml @  75 mls/hr TPN  CONT IV  Last administered on 5/21/20at 22:01;  Start 

5/21/20 at 22:00;  Stop 5/22/20 at 21:59;  Status DC


Saliva Substitute (Biotene Moisturizing Mouth) 2 spray PRN Q15MIN  PRN PO DRY 

MOUTH;  Start 5/21/20 at 11:00


Potassium Chloride 110 meq/ Magnesium Sulfate 20 meq/ Multivitamins 10 

ml/Chromium/ Copper/Manganese/ Seleni/Zn 1 ml/ Insulin Human Regular 15 unit/ 

Total Parenteral Nutrition/Amino Acids/Dextrose/ Fat Emulsion Intravenous 1,800 

ml @  75 mls/hr TPN  CONT IV  Last administered on 5/22/20at 22:21;  Start 

5/22/20 at 22:00;  Stop 5/23/20 at 21:59;  Status DC


Potassium Chloride 110 meq/ Magnesium Sulfate 20 meq/ Multivitamins 10 

ml/Chromium/ Copper/Manganese/ Seleni/Zn 1 ml/ Insulin Human Regular 15 unit/ 

Total Parenteral Nutrition/Amino Acids/Dextrose/ Fat Emulsion Intravenous 1,800 

ml @  75 mls/hr TPN  CONT IV  Last administered on 5/23/20at 22:04;  Start 

5/23/20 at 22:00;  Stop 5/24/20 at 21:59;  Status DC


Potassium Chloride 110 meq/ Magnesium Sulfate 20 meq/ Multivitamins 10 

ml/Chromium/ Copper/Manganese/ Seleni/Zn 1 ml/ Insulin Human Regular 15 unit/ 

Total Parenteral Nutrition/Amino Acids/Dextrose/ Fat Emulsion Intravenous 1,800 

ml @  75 mls/hr TPN  CONT IV  Last administered on 5/24/20at 22:48;  Start 

5/24/20 at 22:00;  Stop 5/25/20 at 21:59;  Status DC


Potassium Chloride 70 meq/ Magnesium Sulfate 20 meq/ Multivitamins 10 

ml/Chromium/ Copper/Manganese/ Seleni/Zn 1 ml/ Insulin Human Regular 15 unit/ 

Total Parenteral Nutrition/Amino Acids/Dextrose/ Fat Emulsion Intravenous 1,800 

ml @  75 mls/hr TPN  CONT IV  Last administered on 5/25/20at 21:39;  Start 

5/25/20 at 22:00;  Stop 5/26/20 at 21:59;  Status DC


Meropenem 500 mg/ Sodium Chloride 50 ml @  100 mls/hr Q6HRS IV  Last 

administered on 5/27/20at 06:02;  Start 5/25/20 at 18:00;  Stop 5/27/20 at 

09:59;  Status DC


Barium Sulfate (Varibar Thin Liquid Apple) 148 gm 1X  ONCE PO ;  Start 5/26/20 

at 11:45;  Stop 5/26/20 at 11:49;  Status DC


Potassium Chloride 70 meq/ Magnesium Sulfate 20 meq/ Multivitamins 10 

ml/Chromium/ Copper/Manganese/ Seleni/Zn 1 ml/ Insulin Human Regular 15 unit/ 

Total Parenteral Nutrition/Amino Acids/Dextrose/ Fat Emulsion Intravenous 1,800 

ml @  75 mls/hr TPN  CONT IV  Last administered on 5/26/20at 22:27;  Start 

5/26/20 at 22:00;  Stop 5/27/20 at 21:59;  Status DC


Piperacillin Sod/ Tazobactam Sod 3.375 gm/Sodium Chloride 50 ml @  100 mls/hr 

Q6HRS IV  Last administered on 6/4/20at 06:10;  Start 5/27/20 at 12:00;  Stop 

6/4/20 at 07:26;  Status DC


Potassium Chloride 70 meq/ Magnesium Sulfate 20 meq/ Multivitamins 10 

ml/Chromium/ Copper/Manganese/ Seleni/Zn 1 ml/ Insulin Human Regular 15 unit/ 

Total Parenteral Nutrition/Amino Acids/Dextrose/ Fat Emulsion Intravenous 1,800 

ml @  75 mls/hr TPN  CONT IV  Last administered on 5/27/20at 22:03;  Start 

5/27/20 at 22:00;  Stop 5/28/20 at 21:59;  Status DC


Potassium Chloride 70 meq/ Magnesium Sulfate 20 meq/ Multivitamins 10 

ml/Chromium/ Copper/Manganese/ Seleni/Zn 1 ml/ Insulin Human Regular 15 unit/ 

Total Parenteral Nutrition/Amino Acids/Dextrose/ Fat Emulsion Intravenous 1,800 

ml @  75 mls/hr TPN  CONT IV  Last administered on 5/28/20at 22:33;  Start 

5/28/20 at 22:00;  Stop 5/29/20 at 21:59;  Status DC


Potassium Chloride 70 meq/ Magnesium Sulfate 20 meq/ Multivitamins 10 

ml/Chromium/ Copper/Manganese/ Seleni/Zn 1 ml/ Insulin Human Regular 15 unit/ 

Total Parenteral Nutrition/Amino Acids/Dextrose/ Fat Emulsion Intravenous 1,800 

ml @  75 mls/hr TPN  CONT IV  Last administered on 5/29/20at 23:13;  Start 

5/29/20 at 22:00;  Stop 5/30/20 at 21:59;  Status DC


Potassium Chloride 80 meq/ Magnesium Sulfate 20 meq/ Multivitamins 10 

ml/Chromium/ Copper/Manganese/ Seleni/Zn 1 ml/ Insulin Human Regular 15 unit/ 

Total Parenteral Nutrition/Amino Acids/Dextrose/ Fat Emulsion Intravenous 1,800 

ml @  75 mls/hr TPN  CONT IV  Last administered on 5/30/20at 22:30;  Start 

5/30/20 at 22:00;  Stop 5/31/20 at 21:59;  Status DC


Potassium Chloride 80 meq/ Magnesium Sulfate 20 meq/ Multivitamins 10 

ml/Chromium/ Copper/Manganese/ Seleni/Zn 1 ml/ Insulin Human Regular 15 unit/ 

Total Parenteral Nutrition/Amino Acids/Dextrose/ Fat Emulsion Intravenous 1,800 

ml @  75 mls/hr TPN  CONT IV  Last administered on 5/31/20at 21:54;  Start 

5/31/20 at 22:00;  Stop 6/1/20 at 21:59;  Status DC


Potassium Chloride/Water 100 ml @  100 mls/hr 1X  ONCE IV  Last administered on 

6/1/20at 10:15;  Start 6/1/20 at 10:00;  Stop 6/1/20 at 10:59;  Status DC


Potassium Chloride 90 meq/ Magnesium Sulfate 20 meq/ Multivitamins 10 

ml/Chromium/ Copper/Manganese/ Seleni/Zn 1 ml/ Insulin Human Regular 20 unit/ 

Total Parenteral Nutrition/Amino Acids/Dextrose/ Fat Emulsion Intravenous 1,800 

ml @  75 mls/hr TPN  CONT IV  Last administered on 6/1/20at 22:28;  Start 6/1/20

at 22:00;  Stop 6/2/20 at 21:59;  Status DC


Potassium Chloride 90 meq/ Magnesium Sulfate 20 meq/ Multivitamins 10 

ml/Chromium/ Copper/Manganese/ Seleni/Zn 1 ml/ Insulin Human Regular 20 unit/ 

Total Parenteral Nutrition/Amino Acids/Dextrose/ Fat Emulsion Intravenous 1,800 

ml @  75 mls/hr TPN  CONT IV  Last administered on 6/2/20at 22:08;  Start 6/2/20

at 22:00;  Stop 6/3/20 at 21:59;  Status DC


Lorazepam (Ativan Inj) 0.25 mg PRN Q4HRS  PRN IVP ANXIETY / AGITATION Last admi

nistered on 7/12/20at 00:27;  Start 6/3/20 at 07:30


Potassium Chloride 90 meq/ Magnesium Sulfate 20 meq/ Multivitamins 10 ml/Chr

omium/ Copper/Manganese/ Seleni/Zn 1 ml/ Insulin Human Regular 20 unit/ Total 

Parenteral Nutrition/Amino Acids/Dextrose/ Fat Emulsion Intravenous 1,800 ml @  

75 mls/hr TPN  CONT IV  Last administered on 6/3/20at 23:13;  Start 6/3/20 at 

22:00;  Stop 6/4/20 at 21:59;  Status DC


Furosemide (Lasix) 40 mg DAILY IVP  Last administered on 6/5/20at 11:14;  Start 

6/3/20 at 13:30;  Stop 6/7/20 at 09:12;  Status DC


Fluoxetine HCl (PROzac) 20 mg QHS PEG  Last administered on 7/11/20at 21:27;  

Start 6/4/20 at 21:00


Fentanyl (Duragesic 50mcg/ Hr Patch) 1 patch Q72H TD  Last administered on 

6/4/20at 21:22;  Start 6/4/20 at 21:00;  Stop 6/13/20 at 12:00;  Status DC


Potassium Chloride 40 meq/ Potassium Acetate 60 meq/Magnesium Sulfate 10 meq/ 

Multivitamins 10 ml/Chromium/ Copper/Manganese/ Seleni/Zn 1 ml/ Insulin Human 

Regular 20 unit/ Total Parenteral Nutrition/Amino Acids/Dextrose/ Fat Emulsion 

Intravenous 1,800 ml @  75 mls/hr TPN  CONT IV  Last administered on 6/5/20at 

00:03;  Start 6/4/20 at 22:00;  Stop 6/5/20 at 21:59;  Status DC


Potassium Acetate 80 meq/Magnesium Sulfate 5 meq/ Multivitamins 10 ml/Chromium/ 

Copper/Manganese/ Seleni/Zn 1 ml/ Insulin Human Regular 20 unit/ Total 

Parenteral Nutrition/Amino Acids/Dextrose/ Fat Emulsion Intravenous 1,920 ml @  

80 mls/hr TPN  CONT IV  Last administered on 6/5/20at 21:59;  Start 6/5/20 at 

22:00;  Stop 6/6/20 at 21:59;  Status DC


Potassium Acetate 60 meq/Magnesium Sulfate 5 meq/ Multivitamins 10 ml/Chromium/ 

Copper/Manganese/ Seleni/Zn 1 ml/ Insulin Human Regular 30 unit/ Total 

Parenteral Nutrition/Amino Acids/Dextrose/ Fat Emulsion Intravenous 1,920 ml @  

80 mls/hr TPN  CONT IV  Last administered on 6/6/20at 21:54;  Start 6/6/20 at 

22:00;  Stop 6/7/20 at 21:59;  Status DC


Norepinephrine Bitartrate 8 mg/ Dextrose 258 ml @  13.332 mls/ hr CONT  PRN IV 

PER PROTOCOL Last administered on 7/2/20at 09:09;  Start 6/7/20 at 06:30


Albumin Human 500 ml @  125 mls/hr 1X  ONCE IV  Last administered on 6/7/20at 

08:10;  Start 6/7/20 at 08:15;  Stop 6/7/20 at 12:14;  Status DC


Potassium Acetate 40 meq/Magnesium Sulfate 5 meq/ Multivitamins 10 ml/Chromium/ 

Copper/Manganese/ Seleni/Zn 1 ml/ Insulin Human Regular 30 unit/ Total 

Parenteral Nutrition/Amino Acids/Dextrose/ Fat Emulsion Intravenous 1,920 ml @  

80 mls/hr TPN  CONT IV  Last administered on 6/7/20at 22:23;  Start 6/7/20 at 

22:00;  Stop 6/8/20 at 21:59;  Status DC


Meropenem 1 gm/ Sodium Chloride 100 ml @  200 mls/hr Q8HRS IV ;  Start 6/7/20 at

14:00;  Status Cancel


Meropenem 1 gm/ Sodium Chloride 100 ml @  200 mls/hr Q8HRS IV  Last administered

on 6/7/20at 11:04;  Start 6/7/20 at 10:00;  Stop 6/7/20 at 13:00;  Status DC


Meropenem 1 gm/ Sodium Chloride 100 ml @  200 mls/hr Q12HR IV  Last administered

on 6/25/20at 08:27;  Start 6/7/20 at 21:00;  Stop 6/25/20 at 08:56;  Status DC


Sodium Chloride 1,000 ml @  1,000 mls/hr 1X  ONCE IV  Last administered on 

6/7/20at 11:06;  Start 6/7/20 at 10:45;  Stop 6/7/20 at 11:44;  Status DC


Micafungin Sodium 100 mg/Dextrose 100 ml @  100 mls/hr Q24H IV  Last 

administered on 6/24/20at 12:34;  Start 6/7/20 at 11:00;  Stop 6/25/20 at 08:56;

 Status DC


Daptomycin 410 mg/ Sodium Chloride 50 ml @  100 mls/hr Q24H IV  Last 

administered on 6/9/20at 13:33;  Start 6/7/20 at 14:00;  Stop 6/10/20 at 08:30; 

Status DC


Midazolam HCl (Versed) 2 mg STK-MED ONCE .ROUTE ;  Start 6/7/20 at 14:47;  Stop 

6/7/20 at 14:48;  Status DC


Fentanyl Citrate (Fentanyl 2ml Vial) 100 mcg STK-MED ONCE .ROUTE ;  Start 6/7/20

at 14:47;  Stop 6/7/20 at 14:48;  Status DC


Flumazenil (Romazicon) 0.5 mg STK-MED ONCE IV ;  Start 6/7/20 at 14:48;  Stop 6 /7/20 at 14:48;  Status DC


Naloxone HCl (Narcan) 0.4 mg STK-MED ONCE .ROUTE ;  Start 6/7/20 at 14:48;  Stop

6/7/20 at 14:48;  Status DC


Lidocaine HCl (Lidocaine 1% 20ml Vial) 20 ml STK-MED ONCE .ROUTE ;  Start 6/7/20

at 14:48;  Stop 6/7/20 at 14:48;  Status DC


Midazolam HCl (Versed) 2 mg 1X  ONCE IV  Last administered on 6/7/20at 15:28;  

Start 6/7/20 at 15:00;  Stop 6/7/20 at 15:01;  Status DC


Fentanyl Citrate (Fentanyl 2ml Vial) 100 mcg 1X  ONCE IV  Last administered on 

6/7/20at 15:28;  Start 6/7/20 at 15:00;  Stop 6/7/20 at 15:01;  Status DC


Lidocaine HCl (Lidocaine 1% 20ml Vial) 20 ml 1X  ONCE INJ  Last administered on 

6/7/20at 15:30;  Start 6/7/20 at 15:00;  Stop 6/7/20 at 15:01;  Status DC


Sodium Chloride 1,000 ml @  100 mls/hr Q10H IV  Last administered on 6/16/20at 

07:30;  Start 6/7/20 at 20:00;  Stop 6/16/20 at 11:26;  Status DC


Sodium Bicarbonate (Sodium Bicarb Adult 8.4% Syr) 50 meq 1X  ONCE IV  Last 

administered on 6/7/20at 21:47;  Start 6/7/20 at 22:00;  Stop 6/7/20 at 22:01;  

Status DC


Potassium Acetate 40 meq/Magnesium Sulfate 5 meq/ Multivitamins 10 ml/Chromium/ 

Copper/Manganese/ Seleni/Zn 1 ml/ Insulin Human Regular 30 unit/ Total 

Parenteral Nutrition/Amino Acids/Dextrose/ Fat Emulsion Intravenous 1,920 ml @  

80 mls/hr TPN  CONT IV  Last administered on 6/8/20at 22:28;  Start 6/8/20 at 

22:00;  Stop 6/9/20 at 21:59;  Status DC


Sodium Chloride 500 ml @  500 mls/hr 1X  ONCE IV  Last administered on 6/9/20at 

06:39;  Start 6/9/20 at 06:45;  Stop 6/9/20 at 07:44;  Status DC


Potassium Acetate 40 meq/Magnesium Sulfate 5 meq/ Multivitamins 10 ml/Chromium/ 

Copper/Manganese/ Seleni/Zn 1 ml/ Insulin Human Regular 30 unit/ Total Pare

nteral Nutrition/Amino Acids/Dextrose/ Fat Emulsion Intravenous 1,920 ml @  80 

mls/hr TPN  CONT IV  Last administered on 6/9/20at 22:03;  Start 6/9/20 at 

22:00;  Stop 6/10/20 at 21:59;  Status DC


Metoprolol Tartrate (Lopressor Vial) 5 mg PRN Q6HRS  PRN IVP HYPERTENSION Last 

administered on 7/12/20at 08:21;  Start 6/10/20 at 09:00


Potassium Acetate 40 meq/Magnesium Sulfate 5 meq/ Multivitamins 10 ml/Chromium/ 

Copper/Manganese/ Seleni/Zn 1 ml/ Insulin Human Regular 30 unit/ Total 

Parenteral Nutrition/Amino Acids/Dextrose/ Fat Emulsion Intravenous 1,920 ml @  

80 mls/hr TPN  CONT IV  Last administered on 6/10/20at 21:26;  Start 6/10/20 at 

22:00;  Stop 6/11/20 at 21:59;  Status DC


Potassium Acetate 40 meq/Magnesium Sulfate 5 meq/ Multivitamins 10 ml/Chromium/ 

Copper/Manganese/ Seleni/Zn 1 ml/ Insulin Human Regular 30 unit/ Total 

Parenteral Nutrition/Amino Acids/Dextrose/ Fat Emulsion Intravenous 1,920 ml @  

80 mls/hr TPN  CONT IV  Last administered on 6/11/20at 23:23;  Start 6/11/20 at 

22:00;  Stop 6/12/20 at 21:59;  Status DC


Potassium Acetate 40 meq/Magnesium Sulfate 5 meq/ Multivitamins 10 ml/Chromium/ 

Copper/Manganese/ Seleni/Zn 1 ml/ Insulin Human Regular 30 unit/ Total 

Parenteral Nutrition/Amino Acids/Dextrose/ Fat Emulsion Intravenous 1,920 ml @  

80 mls/hr TPN  CONT IV  Last administered on 6/12/20at 21:35;  Start 6/12/20 at 

22:00;  Stop 6/13/20 at 21:59;  Status DC


Furosemide (Lasix) 20 mg 1X  ONCE IVP  Last administered on 6/13/20at 06:26;  

Start 6/13/20 at 06:15;  Stop 6/13/20 at 06:16;  Status DC


Methylprednisolone Sodium Succinate (SOLU-Medrol 125MG VIAL) 125 mg 1X  ONCE IV 

Last administered on 6/13/20at 06:26;  Start 6/13/20 at 06:15;  Stop 6/13/20 at 

06:16;  Status DC


Albuterol/ Ipratropium (Duoneb) 3 ml Q4HRS NEB  Last administered on 7/12/20at 

12:00;  Start 6/13/20 at 08:00


Fentanyl Citrate 30 ml @ 0 mls/hr CONT  PRN IV SEE PROTOCOL Last administered on

7/4/20at 08:03;  Start 6/13/20 at 06:00;  Stop 7/4/20 at 12:42;  Status DC


Propofol 100 ml @ 0 mls/hr CONT  PRN IV SEE PROTOCOL Last administered on 6 /20/20at 23:50;  Start 6/13/20 at 06:00


Fentanyl Citrate (Fentanyl 2ml Vial) 25 mcg PRN Q1HR  PRN IV SEE COMMENTS Last 

administered on 7/11/20at 14:09;  Start 6/13/20 at 06:00


Fentanyl Citrate (Fentanyl 2ml Vial) 50 mcg PRN Q1HR  PRN IV SEE COMMENTS Last a

dministered on 7/12/20at 12:42;  Start 6/13/20 at 06:00


Chlorhexidine Gluconate (Peridex) 15 ml BID MM ;  Start 6/13/20 at 09:00;  Stop 

6/13/20 at 07:58;  Status DC


Potassium Acetate 40 meq/Magnesium Sulfate 5 meq/ Multivitamins 10 ml/Chromium/ 

Copper/Manganese/ Seleni/Zn 1 ml/ Insulin Human Regular 30 unit/ Total 

Parenteral Nutrition/Amino Acids/Dextrose/ Fat Emulsion Intravenous 1,920 ml @  

80 mls/hr TPN  CONT IV  Last administered on 6/13/20at 21:19;  Start 6/13/20 at 

22:00;  Stop 6/14/20 at 21:59;  Status DC


Acetylcysteine (Mucomyst 20% Resp Treatment) 600 mg BID NEB  Last administered 

on 6/19/20at 09:33;  Start 6/13/20 at 21:00;  Stop 6/19/20 at 10:39;  Status DC


Magnesium Sulfate 100 ml @  25 mls/hr 1X  ONCE IV  Last administered on 

6/13/20at 15:48;  Start 6/13/20 at 15:45;  Stop 6/13/20 at 19:44;  Status DC


Potassium Acetate 40 meq/Magnesium Sulfate 5 meq/ Multivitamins 10 ml/Chromium/ 

Copper/Manganese/ Seleni/Zn 1 ml/ Insulin Human Regular 30 unit/ Total 

Parenteral Nutrition/Amino Acids/Dextrose/ Fat Emulsion Intravenous 1,920 ml @  

80 mls/hr TPN  CONT IV  Last administered on 6/14/20at 21:35;  Start 6/14/20 at 

22:00;  Stop 6/15/20 at 21:59;  Status DC


Potassium Chloride/Water 100 ml @  100 mls/hr Q1H IV  Last administered on 

6/15/20at 08:31;  Start 6/15/20 at 07:00;  Stop 6/15/20 at 08:59;  Status DC


Potassium Acetate 40 meq/Magnesium Sulfate 5 meq/ Multivitamins 10 ml/Chromium/ 

Copper/Manganese/ Seleni/Zn 1 ml/ Insulin Human Regular 30 unit/ Total 

Parenteral Nutrition/Amino Acids/Dextrose/ Fat Emulsion Intravenous 1,920 ml @  

80 mls/hr TPN  CONT IV  Last administered on 6/15/20at 21:54;  Start 6/15/20 at 

22:00;  Stop 6/16/20 at 19:34;  Status DC


Lidocaine HCl (Buffered Lidocaine 1%) 3 ml STK-MED ONCE .ROUTE ;  Start 6/15/20 

at 12:14;  Stop 6/15/20 at 12:14;  Status DC


Lidocaine HCl (Buffered Lidocaine 1%) 3 ml 1X  ONCE IJ  Last administered on 

6/15/20at 13:11;  Start 6/15/20 at 13:00;  Stop 6/15/20 at 13:01;  Status DC


Magnesium Sulfate 50 ml @ 25 mls/hr 1X  ONCE IV ;  Start 6/16/20 at 08:15;  Stop

6/16/20 at 10:14;  Status DC


Potassium Acetate 40 meq/Magnesium Sulfate 10 meq/ Multivitamins 10 ml/Chromium/

Copper/Manganese/ Seleni/Zn 1 ml/ Insulin Human Regular 20 unit/ Total 

Parenteral Nutrition/Amino Acids/Dextrose/ Fat Emulsion Intravenous 1,920 ml @  

80 mls/hr TPN  CONT IV  Last administered on 6/16/20at 21:32;  Start 6/16/20 at 

22:00;  Stop 6/17/20 at 21:59;  Status DC


Potassium Chloride/Water 100 ml @  100 mls/hr Q1H IV  Last administered on 

6/17/20at 09:12;  Start 6/17/20 at 08:00;  Stop 6/17/20 at 09:59;  Status DC


Alteplase, Recombinant (Cathflo For Central Catheter Clearance) 4 mg 1X  ONCE 

INT CAT ;  Start 6/17/20 at 09:15;  Stop 6/17/20 at 09:16;  Status UNV


Alteplase, Recombinant (Cathflo For Central Catheter Clearance) 4 mg 1X  ONCE 

INT CAT ;  Start 6/17/20 at 09:15;  Stop 6/17/20 at 09:16;  Status UNV


Alteplase, Recombinant (Cathflo For Central Catheter Clearance) 4 mg 1X  ONCE 

INT CAT ;  Start 6/17/20 at 09:15;  Stop 6/17/20 at 09:16;  Status UNV


Alteplase, Recombinant 4 mg/ Sodium Chloride 20 ml @ 20 mls/hr 1X  ONCE IV  Last

administered on 6/17/20at 10:10;  Start 6/17/20 at 10:00;  Stop 6/17/20 at 

10:59;  Status DC


Alteplase, Recombinant 4 mg/ Sodium Chloride 20 ml @ 20 mls/hr 1X  ONCE IV  Last

administered on 6/17/20at 10:09;  Start 6/17/20 at 10:00;  Stop 6/17/20 at 

10:59;  Status DC


Alteplase, Recombinant 4 mg/ Sodium Chloride 20 ml @ 20 mls/hr 1X  ONCE IV  Last

administered on 6/17/20at 10:09;  Start 6/17/20 at 10:00;  Stop 6/17/20 at 

10:59;  Status DC


Potassium Acetate 60 meq/Magnesium Sulfate 10 meq/ Multivitamins 10 ml/Chromium/

Copper/Manganese/ Seleni/Zn 1 ml/ Insulin Human Regular 20 unit/ Total 

Parenteral Nutrition/Amino Acids/Dextrose/ Fat Emulsion Intravenous 1,920 ml @  

80 mls/hr TPN  CONT IV  Last administered on 6/17/20at 21:55;  Start 6/17/20 at 

22:00;  Stop 6/18/20 at 21:59;  Status DC


Albumin Human 500 ml @  125 mls/hr 1X  ONCE IV  Last administered on 6/18/20at 

12:01;  Start 6/18/20 at 11:15;  Stop 6/18/20 at 15:14;  Status DC


Sodium Chloride 500 ml @  500 mls/hr 1X  ONCE IV  Last administered on 6/18/20at

13:50;  Start 6/18/20 at 11:15;  Stop 6/18/20 at 12:14;  Status DC


Potassium Acetate 60 meq/Magnesium Sulfate 14 meq/ Multivitamins 10 ml/Chromium/

Copper/Manganese/ Seleni/Zn 1 ml/ Insulin Human Regular 20 unit/ Total Pare

nteral Nutrition/Amino Acids/Dextrose/ Fat Emulsion Intravenous 1,920 ml @  80 

mls/hr TPN  CONT IV  Last administered on 6/18/20at 22:26;  Start 6/18/20 at 

22:00;  Stop 6/19/20 at 21:59;  Status DC


Ciprofloxacin/ Dextrose 200 ml @  200 mls/hr Q12HR IV  Last administered on 

6/25/20at 08:27;  Start 6/18/20 at 21:00;  Stop 6/25/20 at 08:56;  Status DC


Albumin Human 250 ml @  62.5 mls/hr 1X  ONCE IV  Last administered on 6/19/20at 

11:09;  Start 6/19/20 at 11:00;  Stop 6/19/20 at 14:59;  Status DC


Furosemide (Lasix) 20 mg 1X  ONCE IVP  Last administered on 6/19/20at 14:52;  

Start 6/19/20 at 10:45;  Stop 6/19/20 at 10:49;  Status DC


Potassium Acetate 60 meq/Magnesium Sulfate 14 meq/ Multivitamins 10 ml/Chromium/

Copper/Manganese/ Seleni/Zn 1 ml/ Insulin Human Regular 15 unit/ Total 

Parenteral Nutrition/Amino Acids/Dextrose/ Fat Emulsion Intravenous 1,920 ml @  

80 mls/hr TPN  CONT IV  Last administered on 6/19/20at 22:08;  Start 6/19/20 at 

22:00;  Stop 6/20/20 at 21:59;  Status DC


Potassium Acetate 60 meq/Magnesium Sulfate 14 meq/ Multivitamins 10 ml/Chromium/

Copper/Manganese/ Seleni/Zn 1 ml/ Insulin Human Regular 15 unit/ Total 

Parenteral Nutrition/Amino Acids/Dextrose/ Fat Emulsion Intravenous 1,920 ml @  

80 mls/hr TPN  CONT IV  Last administered on 6/20/20at 22:12;  Start 6/20/20 at 

22:00;  Stop 6/21/20 at 21:59;  Status DC


Potassium Acetate 60 meq/Magnesium Sulfate 14 meq/ Multivitamins 10 ml/Chromium/

Copper/Manganese/ Seleni/Zn 1 ml/ Insulin Human Regular 15 unit/ Total 

Parenteral Nutrition/Amino Acids/Dextrose/ Fat Emulsion Intravenous 1,920 ml @  

80 mls/hr TPN  CONT IV  Last administered on 6/21/20at 22:22;  Start 6/21/20 at 

22:00;  Stop 6/22/20 at 21:59;  Status DC


Furosemide (Lasix) 20 mg 1X  ONCE IVP  Last administered on 6/22/20at 11:07;  

Start 6/22/20 at 10:30;  Stop 6/22/20 at 10:34;  Status DC


Potassium Acetate 60 meq/Magnesium Sulfate 14 meq/ Multivitamins 10 ml/Chromium/

Copper/Manganese/ Seleni/Zn 1 ml/ Insulin Human Regular 15 unit/ Sodium Chloride

20 meq/Total Parenteral Nutrition/Amino Acids/Dextrose/ Fat Emulsion Intravenous

1,920 ml @  80 mls/hr TPN  CONT IV  Last administered on 6/22/20at 21:54;  Start

6/22/20 at 22:00;  Stop 6/23/20 at 21:59;  Status DC


Potassium Acetate 30 meq/Magnesium Sulfate 14 meq/ Multivitamins 10 ml/Chromium/

Copper/Manganese/ Seleni/Zn 1 ml/ Insulin Human Regular 15 unit/ Sodium Chloride

20 meq/Potassium Chloride 30 meq/ Total Parenteral Nutrition/Amino 

Acids/Dextrose/ Fat Emulsion Intravenous 1,920 ml @  80 mls/hr TPN  CONT IV  

Last administered on 6/23/20at 21:46;  Start 6/23/20 at 22:00;  Stop 6/24/20 at 

21:59;  Status DC


Sodium Chloride 80 meq/Potassium Chloride 30 meq/ Potassium Acetate 30 me

q/Magnesium Sulfate 14 meq/ Multivitamins 10 ml/Chromium/ Copper/Manganese/ 

Seleni/Zn 1 ml/ Insulin Human Regular 15 unit/ Total Parenteral Nutrition/Amino 

Acids/Dextrose/ Fat Emulsion Intravenous 1,920 ml @  80 mls/hr TPN  CONT IV  

Last administered on 6/24/20at 22:33;  Start 6/24/20 at 22:00;  Stop 6/25/20 at 

21:59;  Status DC


Furosemide (Lasix) 40 mg 1X  ONCE IVP  Last administered on 6/24/20at 16:27;  

Start 6/24/20 at 15:30;  Stop 6/24/20 at 15:33;  Status DC


Albumin Human 250 ml @  62.5 mls/hr 1X  ONCE IV  Last administered on 6/24/20at 

16:27;  Start 6/24/20 at 15:30;  Stop 6/24/20 at 19:29;  Status DC


Sodium Chloride 80 meq/Potassium Chloride 30 meq/ Potassium Acetate 30 

meq/Magnesium Sulfate 14 meq/ Multivitamins 10 ml/Chromium/ Copper/Manganese/ 

Seleni/Zn 1 ml/ Insulin Human Regular 15 unit/ Total Parenteral Nutrition/Amino 

Acids/Dextrose/ Fat Emulsion Intravenous 1,920 ml @  80 mls/hr TPN  CONT IV  

Last administered on 6/25/20at 22:25;  Start 6/25/20 at 22:00;  Stop 6/26/20 at 

21:59;  Status DC


Sodium Chloride 80 meq/Potassium Chloride 30 meq/ Potassium Acetate 30 

meq/Magnesium Sulfate 14 meq/ Multivitamins 10 ml/Chromium/ Copper/Manganese/ 

Seleni/Zn 1 ml/ Insulin Human Regular 15 unit/ Total Parenteral Nutrition/Amino 

Acids/Dextrose/ Fat Emulsion Intravenous 1,920 ml @  80 mls/hr TPN  CONT IV  

Last administered on 6/26/20at 21:32;  Start 6/26/20 at 22:00;  Stop 6/27/20 at 

21:59;  Status DC


Sodium Chloride 80 meq/Potassium Chloride 30 meq/ Potassium Acetate 30 

meq/Magnesium Sulfate 14 meq/ Multivitamins 10 ml/Chromium/ Copper/Manganese/ 

Seleni/Zn 1 ml/ Insulin Human Regular 15 unit/ Total Parenteral Nutrition/Amino 

Acids/Dextrose/ Fat Emulsion Intravenous 1,920 ml @  80 mls/hr TPN  CONT IV  

Last administered on 6/27/20at 21:53;  Start 6/27/20 at 22:00;  Stop 6/28/20 at 

21:59;  Status DC


Acetylcysteine (Mucomyst 20% Resp Treatment) 600 mg RTBID NEB  Last administered

on 7/12/20at 08:00;  Start 6/27/20 at 12:00


Sodium Chloride 80 meq/Potassium Chloride 30 meq/ Potassium Acetate 30 

meq/Magnesium Sulfate 14 meq/ Multivitamins 10 ml/Chromium/ Copper/Manganese/ 

Seleni/Zn 1 ml/ Insulin Human Regular 15 unit/ Total Parenteral Nutrition/Amino 

Acids/Dextrose/ Fat Emulsion Intravenous 1,920 ml @  80 mls/hr TPN  CONT IV  

Last administered on 6/28/20at 22:06;  Start 6/28/20 at 22:00;  Stop 6/29/20 at 

21:59;  Status DC


Meropenem 500 mg/ Sodium Chloride 50 ml @  100 mls/hr Q6HRS IV  Last adminis

tered on 7/12/20at 12:40;  Start 6/28/20 at 18:00


Daptomycin 500 mg/ Sodium Chloride 50 ml @  100 mls/hr Q24H IV  Last administe

red on 7/6/20at 21:47;  Start 6/28/20 at 19:00;  Stop 7/7/20 at 08:13;  Status 

DC


Sodium Chloride 80 meq/Potassium Chloride 30 meq/ Potassium Acetate 30 

meq/Magnesium Sulfate 14 meq/ Multivitamins 10 ml/Chromium/ Copper/Manganese/ 

Seleni/Zn 1 ml/ Insulin Human Regular 15 unit/ Total Parenteral Nutrition/Amino 

Acids/Dextrose/ Fat Emulsion Intravenous 1,920 ml @  80 mls/hr TPN  CONT IV  

Last administered on 6/29/20at 22:09;  Start 6/29/20 at 22:00;  Stop 6/30/20 at 

21:59;  Status DC


Heparin Sodium (Porcine) 1000 unit/Sodium Chloride 1,001 ml @  1,001 mls/hr 1X  

ONCE IRR ;  Start 6/30/20 at 06:00;  Stop 6/30/20 at 06:59;  Status DC


Propofol (Diprivan) 200 mg STK-MED ONCE IV ;  Start 6/30/20 at 07:44;  Stop 

6/30/20 at 07:44;  Status DC


Lidocaine HCl (Lidocaine Pf 2% Vial) 5 ml STK-MED ONCE .ROUTE ;  Start 6/30/20 

at 07:44;  Stop 6/30/20 at 07:44;  Status DC


Fentanyl Citrate (Fentanyl 2ml Vial) 100 mcg STK-MED ONCE .ROUTE ;  Start 

6/30/20 at 07:44;  Stop 6/30/20 at 07:44;  Status DC


Rocuronium Bromide (Zemuron) 100 mg STK-MED ONCE .ROUTE ;  Start 6/30/20 at 

07:44;  Stop 6/30/20 at 07:44;  Status DC


Micafungin Sodium 100 mg/Dextrose 100 ml @  100 mls/hr Q24H IV  Last 

administered on 7/12/20at 08:19;  Start 6/30/20 at 08:30


Bupivacaine HCl/ Epinephrine Bitart (Sensorcain-Epi 0.5%-1:[id number] Mpf) 30 ml 

STK-MED ONCE .ROUTE ;  Start 6/30/20 at 08:34;  Stop 6/30/20 at 08:35;  Status 

DC


Iohexol (Omnipaque 300 Mg/ml) 50 ml STK-MED ONCE .ROUTE  Last administered on 

6/30/20at 13:30;  Start 6/30/20 at 08:35;  Stop 6/30/20 at 08:35;  Status DC


Sodium Chloride 80 meq/Potassium Chloride 30 meq/ Potassium Acetate 30 

meq/Magnesium Sulfate 14 meq/ Multivitamins 10 ml/Chromium/ Copper/Manganese/ 

Seleni/Zn 1 ml/ Insulin Human Regular 15 unit/ Total Parenteral Nutrition/Amino 

Acids/Dextrose/ Fat Emulsion Intravenous 1,920 ml @  80 mls/hr TPN  CONT IV  

Last administered on 7/1/20at 01:22;  Start 6/30/20 at 22:00;  Stop 7/1/20 at 

21:59;  Status DC


Phenylephrine HCl (Ken-Synephrine Inj) 10 mg STK-MED ONCE .ROUTE ;  Start 

6/30/20 at 10:15;  Stop 6/30/20 at 10:15;  Status DC


Desflurane (Suprane) 90 ml STK-MED ONCE IH ;  Start 6/30/20 at 10:18;  Stop 

6/30/20 at 10:19;  Status DC


Albumin Human 500 ml @  As Directed STK-MED ONCE IV ;  Start 6/30/20 at 11:06;  

Stop 6/30/20 at 11:06;  Status DC


Vasopressin (Vasostrict) 20 unit STK-MED ONCE .ROUTE ;  Start 6/30/20 at 12:23; 

Stop 6/30/20 at 12:23;  Status DC


Phenylephrine HCl (Ken-Synephrine Inj) 10 mg STK-MED ONCE .ROUTE ;  Start 

6/30/20 at 13:33;  Stop 6/30/20 at 13:33;  Status DC


Phenylephrine HCl (Ken-Synephrine Inj) 10 mg STK-MED ONCE .ROUTE ;  Start 

6/30/20 at 13:33;  Stop 6/30/20 at 13:33;  Status DC


Ondansetron HCl (Zofran) 4 mg STK-MED ONCE .ROUTE ;  Start 6/30/20 at 13:33;  

Stop 6/30/20 at 13:33;  Status DC


Enoxaparin Sodium (Lovenox 40mg Syringe) 40 mg Q24H SQ  Last administered on 

7/12/20at 08:20;  Start 7/1/20 at 08:00


Sodium Chloride (Normal Saline Flush) 3 ml QSHIFT  PRN IV AFTER MEDS AND BLOOD 

DRAWS;  Start 6/30/20 at 14:45


Naloxone HCl (Narcan) 0.4 mg PRN Q2MIN  PRN IV SEE INSTRUCTIONS;  Start 6/30/20 

at 14:45


Sodium Chloride 1,000 ml @  25 mls/hr Q24H IV  Last administered on 7/8/20at 

21:15;  Start 6/30/20 at 14:33


Morphine Sulfate (Morphine Sulfate) 1 mg PRN Q1HR  PRN IV PAIN;  Start 6/30/20 

at 14:45


Midazolam HCl 100 mg/Sodium Chloride 100 ml @ 1 mls/hr CONT  PRN IV SEE I/O 

RECORD Last administered on 7/3/20at 18:48;  Start 6/30/20 at 14:45


Phenylephrine HCl (PHENYLEPHRINE in 0.9% NACL PF) 1 mg STK-MED ONCE IV ;  Start 

6/30/20 at 14:44;  Stop 6/30/20 at 14:45;  Status DC


Ephedrine Sulfate (ePHEDrine PF IN SALINE SYRINGE) 50 mg STK-MED ONCE IV ;  

Start 6/30/20 at 14:45;  Stop 6/30/20 at 14:45;  Status DC


Vasopressin 20 unit/Dextrose 101 ml @  12 mls/hr CONT  PRN IV SEE I/O RECORD 

Last administered on 7/7/20at 04:17;  Start 6/30/20 at 15:30


Sodium Chloride 1,000 ml @  1,000 mls/hr 1X  ONCE IV  Last administered on 

6/30/20at 15:42;  Start 6/30/20 at 15:45;  Stop 6/30/20 at 16:44;  Status DC


Albumin Human 500 ml @  125 mls/hr 1X  ONCE IV ;  Start 6/30/20 at 16:00;  Stop 

6/30/20 at 19:59;  Status DC


Albumin Human 500 ml @  125 mls/hr PRN Q1HR  PRN IV PER PROTOCOL;  Start 6/30/20

at 15:45


Magnesium Sulfate 50 ml @ 25 mls/hr 1X  ONCE IV  Last administered on 6/30/20at 

17:02;  Start 6/30/20 at 16:30;  Stop 6/30/20 at 18:29;  Status DC


Sodium Bicarbonate (Sodium Bicarb Adult 8.4% Syr) 50 meq STK-MED ONCE .ROUTE ;  

Start 6/30/20 at 16:20;  Stop 6/30/20 at 16:20;  Status DC


Sodium Bicarbonate (Sodium Bicarb Adult 8.4% Syr) 100 meq 1X  ONCE IV  Last 

administered on 6/30/20at 17:07;  Start 6/30/20 at 16:30;  Stop 6/30/20 at 

16:31;  Status DC


Sodium Bicarbonate 150 meq/Dextrose 1,150 ml @  75 mls/hr 1X  ONCE IV  Last 

administered on 6/30/20at 20:02;  Start 6/30/20 at 16:30;  Stop 7/1/20 at 07:49;

 Status DC


Sodium Chloride 80 meq/Potassium Chloride 30 meq/ Potassium Acetate 30 

meq/Magnesium Sulfate 14 meq/ Multivitamins 10 ml/Chromium/ Copper/Manganese/ 

Seleni/Zn 1 ml/ Insulin Human Regular 15 unit/ Total Parenteral Nutrition/Amino 

Acids/Dextrose/ Fat Emulsion Intravenous 1,920 ml @  80 mls/hr TPN  CONT IV  

Last administered on 7/1/20at 23:05;  Start 7/1/20 at 22:00;  Stop 7/2/20 at 

21:59;  Status DC


Sodium Chloride 100 meq/Potassium Chloride 30 meq/ Potassium Acetate 30 

meq/Magnesium Sulfate 12 meq/ Multivitamins 10 ml/Chromium/ Copper/Manganese/ 

Seleni/Zn 1 ml/ Insulin Human Regular 15 unit/ Total Parenteral Nutrition/Amino 

Acids/Dextrose/ Fat Emulsion Intravenous 1,920 ml @  80 mls/hr TPN  CONT IV  

Last administered on 7/2/20at 21:52;  Start 7/2/20 at 22:00;  Stop 7/3/20 at 

21:59;  Status DC


Sodium Chloride 100 meq/Potassium Chloride 30 meq/ Potassium Acetate 30 

meq/Magnesium Sulfate 12 meq/ Multivitamins 10 ml/Chromium/ Copper/Manganese/ 

Seleni/Zn 1 ml/ Insulin Human Regular 15 unit/ Total Parenteral Nutrition/Amino 

Acids/Dextrose/ Fat Emulsion Intravenous 1,920 ml @  80 mls/hr TPN  CONT IV  

Last administered on 7/3/20at 21:46;  Start 7/3/20 at 22:00;  Stop 7/4/20 at 

21:59;  Status DC


Sodium Chloride 100 meq/Potassium Chloride 30 meq/ Potassium Acetate 30 

meq/Magnesium Sulfate 12 meq/ Multivitamins 10 ml/Chromium/ Copper/Manganese/ 

Seleni/Zn 1 ml/ Insulin Human Regular 15 unit/ Total Parenteral Nutrition/Amino 

Acids/Dextrose/ Fat Emulsion Intravenous 1,800 ml @  75 mls/hr TPN  CONT IV  

Last administered on 7/4/20at 22:04;  Start 7/4/20 at 22:00;  Stop 7/5/20 at 

21:59;  Status DC


Fentanyl Citrate 55 ml @ 0 mls/hr CONT  PRN IV SEE COMMENTS Last administered on

7/6/20at 23:55;  Start 7/4/20 at 13:00;  Stop 7/9/20 at 17:28;  Status DC


Sodium Chloride 100 meq/Potassium Chloride 30 meq/ Potassium Acetate 30 meq/Ma

gnesium Sulfate 12 meq/ Multivitamins 10 ml/Chromium/ Copper/Manganese/ 

Seleni/Zn 1 ml/ Insulin Human Regular 15 unit/ Total Parenteral Nutrition/Amino 

Acids/Dextrose/ Fat Emulsion Intravenous 1,680 ml @  70 mls/hr TPN  CONT IV  

Last administered on 7/5/20at 21:23;  Start 7/5/20 at 22:00;  Stop 7/6/20 at 

21:59;  Status DC


Sodium Chloride 110 meq/Potassium Chloride 30 meq/ Potassium Acetate 30 

meq/Magnesium Sulfate 15 meq/ Multivitamins 10 ml/Chromium/ Copper/Manganese/ 

Seleni/Zn 1 ml/ Insulin Human Regular 15 unit/ Total Parenteral Nutrition/Amino 

Acids/Dextrose/ Fat Emulsion Intravenous 1,680 ml @  70 mls/hr TPN  CONT IV  

Last administered on 7/6/20at 21:48;  Start 7/6/20 at 22:00;  Stop 7/7/20 at 

21:59;  Status DC


Sodium Chloride 110 meq/Potassium Chloride 30 meq/ Potassium Acetate 30 

meq/Magnesium Sulfate 15 meq/ Multivitamins 10 ml/Chromium/ Copper/Manganese/ 

Seleni/Zn 1 ml/ Insulin Human Regular 15 unit/ Total Parenteral Nutrition/Amino 

Acids/Dextrose/ Fat Emulsion Intravenous 1,680 ml @  70 mls/hr TPN  CONT IV  

Last administered on 7/7/20at 21:33;  Start 7/7/20 at 22:00;  Stop 7/8/20 at 

21:59;  Status DC


Sodium Chloride 110 meq/Potassium Chloride 30 meq/ Potassium Acetate 30 

meq/Magnesium Sulfate 15 meq/ Multivitamins 10 ml/Chromium/ Copper/Manganese/ Se

aram/Zn 1 ml/ Insulin Human Regular 15 unit/ Total Parenteral Nutrition/Amino 

Acids/Dextrose/ Fat Emulsion Intravenous 1,680 ml @  70 mls/hr TPN  CONT IV  

Last administered on 7/8/20at 21:51;  Start 7/8/20 at 22:00;  Stop 7/9/20 at 

21:59;  Status DC


Sodium Chloride 90 meq/Potassium Chloride 30 meq/ Potassium Acetate 30 

meq/Magnesium Sulfate 15 meq/ Multivitamins 10 ml/Chromium/ Copper/Manganese/ 

Seleni/Zn 1 ml/ Insulin Human Regular 15 unit/ Total Parenteral Nutrition/Amino 

Acids/Dextrose/ Fat Emulsion Intravenous 1,680 ml @  70 mls/hr TPN  CONT IV  

Last administered on 7/9/20at 22:38;  Start 7/9/20 at 22:00;  Stop 7/10/20 at 

21:59;  Status DC


Fentanyl Citrate 30 ml @ 0 mls/hr CONT  PRN IV SEE I/O RECORD;  Start 7/9/20 at 

17:30


Fentanyl (Duragesic 12mcg/ Hr Patch) 1 patch Q3DAYS TD  Last administered on 

7/10/20at 18:03;  Start 7/10/20 at 09:00


Sodium Chloride 90 meq/Potassium Chloride 30 meq/ Potassium Acetate 30 

meq/Magnesium Sulfate 15 meq/ Multivitamins 10 ml/Chromium/ Copper/Manganese/ 

Seleni/Zn 1 ml/ Insulin Human Regular 15 unit/ Total Parenteral Nutrition/Amino 

Acids/Dextrose/ Fat Emulsion Intravenous 1,680 ml @  70 mls/hr TPN  CONT IV  

Last administered on 7/10/20at 21:59;  Start 7/10/20 at 22:00;  Stop 7/11/20 at 

21:59;  Status DC


Sodium Chloride 90 meq/Potassium Chloride 30 meq/ Potassium Acetate 30 

meq/Magnesium Sulfate 15 meq/ Multivitamins 10 ml/Chromium/ Copper/Manganese/ 

Seleni/Zn 1 ml/ Insulin Human Regular 15 unit/ Total Parenteral Nutrition/Amino 

Acids/Dextrose/ Fat Emulsion Intravenous 1,680 ml @  70 mls/hr TPN  CONT IV  

Last administered on 7/11/20at 21:35;  Start 7/11/20 at 22:00;  Stop 7/12/20 at 

21:59


Vancomycin HCl (Vanco Per Pharmacy) 1 each PRN DAILY  PRN MC SEE COMMENTS Last 

administered on 7/12/20at 09:34;  Start 7/12/20 at 09:15


Ciprofloxacin/ Dextrose 200 ml @  200 mls/hr Q12HR IV  Last administered on 

7/12/20at 10:34;  Start 7/12/20 at 10:00


Vancomycin HCl 2 gm/Sodium Chloride 500 ml @  250 mls/hr 1X  ONCE IV  Last 

administered on 7/12/20at 10:34;  Start 7/12/20 at 10:00;  Stop 7/12/20 at 

11:59;  Status DC


Sodium Chloride 90 meq/Potassium Chloride 30 meq/ Potassium Acetate 30 

meq/Magnesium Sulfate 15 meq/ Multivitamins 10 ml/Chromium/ Copper/Manganese/ 

Seleni/Zn 1 ml/ Insulin Human Regular 15 unit/ Total Parenteral Nutrition/Amino 

Acids/Dextrose/ Fat Emulsion Intravenous 1,680 ml @  70 mls/hr TPN  CONT IV ;  

Start 7/12/20 at 22:00;  Stop 7/13/20 at 21:59





Active Scripts


Active


Reported


Bisoprolol Fumarate 5 Mg Tablet 10 Mg PO DAILY


Vitals/I & O





Vital Sign - Last 24 Hours








 7/11/20 7/11/20 7/11/20 7/11/20





 14:59 15:00 15:37 16:00


 


Temp    99.4





    99.4


 


Pulse  111  114


 


Resp 32 34  16


 


B/P (MAP)  152/95 (114)  142/87 (105)


 


Pulse Ox 98 99  99


 


O2 Delivery Tracheal Collar Trach shield Trach Collar Trach shield


 


O2 Flow Rate 6.0  6.0 


 


    





    





 7/11/20 7/11/20 7/11/20 7/11/20





 16:22 17:00 18:00 18:20


 


Pulse  122 124 


 


Resp  36 38 36


 


B/P (MAP)  150/82 (104) 178/100 (126) 


 


Pulse Ox 100 99 92 


 


O2 Delivery Tracheal Collar Trach shield Trach shield Tracheal Collar


 


O2 Flow Rate 6.0   6.0





 7/11/20 7/11/20 7/11/20 7/11/20





 18:30 18:58 19:00 19:52


 


Pulse 119  120 


 


Resp  36 35 


 


B/P (MAP) 138/80 (99)  156/86 (109) 


 


Pulse Ox  99 100 100


 


O2 Delivery  Tracheal Collar Trach shield Tracheal Collar


 


O2 Flow Rate  6.0  8.0





 7/11/20 7/11/20 7/11/20 7/11/20





 20:00 20:00 21:00 21:50


 


Temp   100.1 





   100.1 


 


Pulse 118  126 


 


Resp 36  39 38


 


B/P (MAP) 158/86 (110)  175/102 (126) 


 


Pulse Ox 100  100 98


 


O2 Delivery Trach shield Trach Collar Trach shield Tracheal Collar


 


O2 Flow Rate  6.0  6.0


 


    





    





 7/11/20 7/11/20 7/11/20 7/12/20





 22:00 22:20 23:00 00:00


 


Pulse 126  126 


 


Resp 36 31 40 


 


B/P (MAP) 151/82 (105)  143/85 (104) 


 


Pulse Ox 100 98 100 


 


O2 Delivery Trach shield Tracheal Collar Trach shield Trach Collar


 


O2 Flow Rate  6.0  6.0





 7/12/20 7/12/20 7/12/20 7/12/20





 00:00 00:12 01:00 02:00


 


Temp    99.4





    99.4


 


Pulse 132  133 135


 


Resp 38  38 44


 


B/P (MAP) 151/90 (110)  155/89 (111) 149/89 (109)


 


Pulse Ox 96 100 96 97


 


O2 Delivery Trach shield Tracheal Collar Trach shield Trach shield


 


O2 Flow Rate  8.0  


 


    





    





 7/12/20 7/12/20 7/12/20 7/12/20





 02:46 03:00 03:16 04:00


 


Pulse  141  139


 


Resp 37 44 44 44


 


B/P (MAP)  152/83 (106)  156/93 (114)


 


Pulse Ox 97 97 95 95


 


O2 Delivery Tracheal Collar Trach shield Tracheal Collar Trach shield


 


O2 Flow Rate 6.0  6.0 





 7/12/20 7/12/20 7/12/20 7/12/20





 04:00 04:41 05:00 05:11


 


Temp   101.9 





   101.9 


 


Pulse   148 


 


Resp  44 44 


 


B/P (MAP)   152/83 (106) 


 


Pulse Ox  94 95 95


 


O2 Delivery Trach Collar Tracheal Collar Trach shield Tracheal Collar


 


O2 Flow Rate 6.0 6.0  6.0


 


    





    





 7/12/20 7/12/20 7/12/20 7/12/20





 06:00 07:00 08:00 08:00


 


Temp    98.4





    98.4


 


Pulse 146 146  142


 


Resp 45 42  42


 


B/P (MAP) 162/90 (114) 167/92 (117)  151/82 (105)


 


Pulse Ox 93 92  93


 


O2 Delivery Trach shield Trach shield Trach Collar Trach shield


 


O2 Flow Rate  9.0 9.0 9.0


 


    





    





 7/12/20 7/12/20 7/12/20 7/12/20





 08:21 08:22 08:52 09:00


 


Pulse 141   


 


Resp  42  36


 


B/P (MAP) 151/82   


 


Pulse Ox  93 96 


 


O2 Delivery  Tracheal Collar Tracheal Collar Tracheal Collar


 


O2 Flow Rate  6.0 8.0 6.0





 7/12/20 7/12/20 7/12/20 7/12/20





 09:00 10:00 11:00 11:50


 


Pulse 118 122 124 


 


Resp 40 41 41 


 


B/P (MAP) 142/90 (107) 138/76 (96) 150/91 (110) 


 


Pulse Ox 99 97 98 


 


O2 Delivery Trach shield Trach shield Trach shield Trach Collar


 


O2 Flow Rate 9.0 9.0 9.0 9.0





 7/12/20 7/12/20 7/12/20 7/12/20





 12:00 12:06 12:42 13:00


 


Temp 100.0   





 100.0   


 


Pulse 127   126


 


Resp 38  40 38


 


B/P (MAP) 133/78 (96)   146/87 (106)


 


Pulse Ox 100 97 100 100


 


O2 Delivery Trach shield Tracheal Collar Tracheal Collar Trach shield


 


O2 Flow Rate 9.0 8.0 9.0 9.0


 


    





    





 7/12/20 7/12/20  





 13:15 14:00  


 


Temp  99.2  





  99.2  


 


Pulse  123  


 


Resp 33 36  


 


B/P (MAP)  132/86 (101)  


 


Pulse Ox  95  


 


O2 Delivery Tracheal Collar Trach shield  


 


O2 Flow Rate 9.0 9.0  














Intake and Output   


 


 7/11/20 7/11/20 7/12/20





 15:00 23:00 07:00


 


Intake Total 300 ml 1080 ml 100 ml


 


Output Total 1345 ml 915 ml 735 ml


 


Balance -1045 ml 165 ml -635 ml











Justicifation of Admission Dx:


Justifications for Admission:


Justification of Admission Dx:  Yes











GAETANO GONCALVES MD        Jul 12, 2020 14:24

## 2020-07-12 NOTE — PDOC
SURGICAL PROGRESS NOTE


Subjective


Pt awake on vent, trying to communicate, fevers last night, concern for 

aspiration, tube feeds stopped


Vital Signs





Vital Signs








  Date Time  Temp Pulse Resp B/P (MAP) Pulse Ox O2 Delivery O2 Flow Rate FiO2


 


7/12/20 09:00   36   Tracheal Collar 6.0 


 


7/12/20 08:52     96   


 


7/12/20 08:21  141  151/82    


 


7/12/20 05:00 101.9       





 101.9       








I&O











Intake and Output 


 


 7/12/20





 07:00


 


Intake Total 1480 ml


 


Output Total 2995 ml


 


Balance -1515 ml


 


 


 


IV Total 1180 ml


 


Tube Feeding 300 ml


 


Output Urine Total 1700 ml


 


Gastric Drainage Total 900 ml


 


Chest Tube Drainage Total 0 ml


 


Drainage Total 395 ml








General:  Alert, Cooperative, mild distress


Abdomen:  Soft, No tenderness, Other (drains in place with pancreatic necrosis)


Labs





Laboratory Tests








Test


 7/10/20


18:00 7/11/20


00:38 7/11/20


06:10 7/11/20


06:14


 


Glucose (Fingerstick)


 138 mg/dL


(70-99) 147 mg/dL


(70-99) 


 159 mg/dL


(70-99)


 


White Blood Count


 


 


 16.0 x10^3/uL


(4.0-11.0) 





 


Red Blood Count


 


 


 2.99 x10^6/uL


(3.50-5.40) 





 


Hemoglobin


 


 


 8.8 g/dL


(12.0-15.5) 





 


Hematocrit


 


 


 26.6 %


(36.0-47.0) 





 


Mean Corpuscular Volume   89 fL ()  


 


Mean Corpuscular Hemoglobin   29 pg (25-35)  


 


Mean Corpuscular Hemoglobin


Concent 


 


 33 g/dL


(31-37) 





 


Red Cell Distribution Width


 


 


 14.8 %


(11.5-14.5) 





 


Platelet Count


 


 


 728 x10^3/uL


(140-400) 





 


Neutrophils (%) (Auto)   87 % (31-73)  


 


Lymphocytes (%) (Auto)   6 % (24-48)  


 


Monocytes (%) (Auto)   7 % (0-9)  


 


Eosinophils (%) (Auto)   1 % (0-3)  


 


Basophils (%) (Auto)   0 % (0-3)  


 


Neutrophils # (Auto)


 


 


 13.9 x10^3/uL


(1.8-7.7) 





 


Lymphocytes # (Auto)


 


 


 0.9 x10^3/uL


(1.0-4.8) 





 


Monocytes # (Auto)


 


 


 1.0 x10^3/uL


(0.0-1.1) 





 


Eosinophils # (Auto)


 


 


 0.1 x10^3/uL


(0.0-0.7) 





 


Basophils # (Auto)


 


 


 0.0 x10^3/uL


(0.0-0.2) 





 


Sodium Level


 


 


 142 mmol/L


(136-145) 





 


Potassium Level


 


 


 4.7 mmol/L


(3.5-5.1) 





 


Chloride Level


 


 


 107 mmol/L


() 





 


Carbon Dioxide Level


 


 


 31 mmol/L


(21-32) 





 


Anion Gap   4 (6-14)  


 


Blood Urea Nitrogen


 


 


 13 mg/dL


(7-20) 





 


Creatinine


 


 


 0.5 mg/dL


(0.6-1.0) 





 


Estimated GFR


(Cockcroft-Gault) 


 


 131.1 


 





 


Glucose Level


 


 


 169 mg/dL


(70-99) 





 


Calcium Level


 


 


 9.4 mg/dL


(8.5-10.1) 





 


Test


 7/11/20


11:17 7/11/20


17:27 7/12/20


00:26 7/12/20


05:15


 


Glucose (Fingerstick)


 144 mg/dL


(70-99) 116 mg/dL


(70-99) 120 mg/dL


(70-99) 





 


White Blood Count


 


 


 


 18.5 x10^3/uL


(4.0-11.0)


 


Red Blood Count


 


 


 


 2.99 x10^6/uL


(3.50-5.40)


 


Hemoglobin


 


 


 


 8.8 g/dL


(12.0-15.5)


 


Hematocrit


 


 


 


 26.3 %


(36.0-47.0)


 


Mean Corpuscular Volume    88 fL () 


 


Mean Corpuscular Hemoglobin    29 pg (25-35) 


 


Mean Corpuscular Hemoglobin


Concent 


 


 


 33 g/dL


(31-37)


 


Red Cell Distribution Width


 


 


 


 15.0 %


(11.5-14.5)


 


Platelet Count


 


 


 


 693 x10^3/uL


(140-400)


 


Neutrophils (%) (Auto)    82 % (31-73) 


 


Lymphocytes (%) (Auto)    10 % (24-48) 


 


Monocytes (%) (Auto)    7 % (0-9) 


 


Eosinophils (%) (Auto)    1 % (0-3) 


 


Basophils (%) (Auto)    0 % (0-3) 


 


Neutrophils # (Auto)


 


 


 


 15.1 x10^3/uL


(1.8-7.7)


 


Lymphocytes # (Auto)


 


 


 


 1.9 x10^3/uL


(1.0-4.8)


 


Monocytes # (Auto)


 


 


 


 1.3 x10^3/uL


(0.0-1.1)


 


Eosinophils # (Auto)


 


 


 


 0.1 x10^3/uL


(0.0-0.7)


 


Basophils # (Auto)


 


 


 


 0.0 x10^3/uL


(0.0-0.2)


 


Sodium Level


 


 


 


 139 mmol/L


(136-145)


 


Potassium Level


 


 


 


 4.6 mmol/L


(3.5-5.1)


 


Chloride Level


 


 


 


 103 mmol/L


()


 


Carbon Dioxide Level


 


 


 


 30 mmol/L


(21-32)


 


Anion Gap    6 (6-14) 


 


Blood Urea Nitrogen


 


 


 


 11 mg/dL


(7-20)


 


Creatinine


 


 


 


 0.5 mg/dL


(0.6-1.0)


 


Estimated GFR


(Cockcroft-Gault) 


 


 


 131.1 





 


BUN/Creatinine Ratio    22 (6-20) 


 


Glucose Level


 


 


 


 122 mg/dL


(70-99)


 


Calcium Level


 


 


 


 9.3 mg/dL


(8.5-10.1)


 


Total Bilirubin


 


 


 


 0.5 mg/dL


(0.2-1.0)


 


Aspartate Amino Transf


(AST/SGOT) 


 


 


 23 U/L (15-37) 





 


Alanine Aminotransferase


(ALT/SGPT) 


 


 


 35 U/L (14-59) 





 


Alkaline Phosphatase


 


 


 


 156 U/L


()


 


Total Protein


 


 


 


 4.8 g/dL


(6.4-8.2)


 


Albumin


 


 


 


 1.5 g/dL


(3.4-5.0)


 


Albumin/Globulin Ratio    0.5 (1.0-1.7) 








Laboratory Tests








Test


 7/11/20


17:27 7/12/20


00:26 7/12/20


05:15


 


Glucose (Fingerstick)


 116 mg/dL


(70-99) 120 mg/dL


(70-99) 





 


White Blood Count


 


 


 18.5 x10^3/uL


(4.0-11.0)


 


Red Blood Count


 


 


 2.99 x10^6/uL


(3.50-5.40)


 


Hemoglobin


 


 


 8.8 g/dL


(12.0-15.5)


 


Hematocrit


 


 


 26.3 %


(36.0-47.0)


 


Mean Corpuscular Volume   88 fL () 


 


Mean Corpuscular Hemoglobin   29 pg (25-35) 


 


Mean Corpuscular Hemoglobin


Concent 


 


 33 g/dL


(31-37)


 


Red Cell Distribution Width


 


 


 15.0 %


(11.5-14.5)


 


Platelet Count


 


 


 693 x10^3/uL


(140-400)


 


Neutrophils (%) (Auto)   82 % (31-73) 


 


Lymphocytes (%) (Auto)   10 % (24-48) 


 


Monocytes (%) (Auto)   7 % (0-9) 


 


Eosinophils (%) (Auto)   1 % (0-3) 


 


Basophils (%) (Auto)   0 % (0-3) 


 


Neutrophils # (Auto)


 


 


 15.1 x10^3/uL


(1.8-7.7)


 


Lymphocytes # (Auto)


 


 


 1.9 x10^3/uL


(1.0-4.8)


 


Monocytes # (Auto)


 


 


 1.3 x10^3/uL


(0.0-1.1)


 


Eosinophils # (Auto)


 


 


 0.1 x10^3/uL


(0.0-0.7)


 


Basophils # (Auto)


 


 


 0.0 x10^3/uL


(0.0-0.2)


 


Sodium Level


 


 


 139 mmol/L


(136-145)


 


Potassium Level


 


 


 4.6 mmol/L


(3.5-5.1)


 


Chloride Level


 


 


 103 mmol/L


()


 


Carbon Dioxide Level


 


 


 30 mmol/L


(21-32)


 


Anion Gap   6 (6-14) 


 


Blood Urea Nitrogen


 


 


 11 mg/dL


(7-20)


 


Creatinine


 


 


 0.5 mg/dL


(0.6-1.0)


 


Estimated GFR


(Cockcroft-Gault) 


 


 131.1 





 


BUN/Creatinine Ratio   22 (6-20) 


 


Glucose Level


 


 


 122 mg/dL


(70-99)


 


Calcium Level


 


 


 9.3 mg/dL


(8.5-10.1)


 


Total Bilirubin


 


 


 0.5 mg/dL


(0.2-1.0)


 


Aspartate Amino Transf


(AST/SGOT) 


 


 23 U/L (15-37) 





 


Alanine Aminotransferase


(ALT/SGPT) 


 


 35 U/L (14-59) 





 


Alkaline Phosphatase


 


 


 156 U/L


()


 


Total Protein


 


 


 4.8 g/dL


(6.4-8.2)


 


Albumin


 


 


 1.5 g/dL


(3.4-5.0)


 


Albumin/Globulin Ratio   0.5 (1.0-1.7) 








Problem List


Problems


Medical Problems:


(1) Acute pancreatitis


Status: Acute  





(2) Cholelithiasis


Status: Acute  








Assessment/Plan


CT with improved pancreatic fluid collections


suspect component of aspiration and agree with holding tube feeds


cont TPN


will need to change/reposition G-J tube at some point, but relatively fresh


cont abx and pulm support





Justicifation of Admission Dx:


Justifications for Admission:


Justification of Admission Dx:  Yes











GAMAL ZHOU MD             Jul 12, 2020 11:24

## 2020-07-12 NOTE — NUR
Pharmacy Vancomycin Dosing Note



S:Consulted to monitor and dose vancomycin started 07/12/20.



O:JESENIA BEAN is a 49 year old F with 

Abscess

HCAP .



Height: 5 feet, 8 inches

Weight: 97.5 kg

Ideal Body Weight: 63.90 

Adjusted Body Weight: 77.34 

Dosing Weight: Actual



Other Antibiotics: 

CIPRO, MERREM, MICAFUGIN



LABS:

Last BUN: 11 

Last Creatinine: 0.5 

Creatinine Clearance: >100 mL/min

Last WBC: 18.5 

Last Procalcitonin: 

Tmax (past 24 hours): 101.9 



Microbiology: 

PENDING



I/O: 1480/2995 



Drug Levels:

Last  level:  on  at 

Last dose given 07/12/20 at 1000 



Vancomycin Dosing:

Loading Dose: 2000 mg x1

Dosing Weight: Actual

Target Trough: 15-20





A: Based on: WEIGHT AND RENAL FUNCTION, VANCOMYCIN 2GM IV BOLUS GIVEN,





P: 1. Begin Vancomycin 1500 mg IV q8h

   2. Follow up Trough level on 07/13/20 at 1000 

   3. Pharmacy will continue to monitor, follow and adjust therapy as needed.

 

 YENIFER STREETER [facility], 07/12/20 [id number]

## 2020-07-12 NOTE — PDOC
PULMONARY PROGRESS NOTES


Subjective


trach, on tm, no sob  is weak, fever 101.9, ? aspirated


Vitals





Vital Signs








  Date Time  Temp Pulse Resp B/P (MAP) Pulse Ox O2 Delivery O2 Flow Rate FiO2


 


7/12/20 09:00   36   Tracheal Collar 6.0 


 


7/12/20 08:52     96   


 


7/12/20 08:21  141  151/82    


 


7/12/20 05:00 101.9       





 101.9       








General:  Alert


HEENT:  Other (trach site ok)


Lungs:  Crackles


Cardiovascular:  S1, S2


Abdomen:  Soft, Non-tender, Other (multiple ROBERT drains )


Neuro Exam:  Alert


Extremities:  Other (+1 BLE edema)


Skin:  Warm


Labs





Laboratory Tests








Test


 7/10/20


18:00 7/11/20


00:38 7/11/20


06:10 7/11/20


06:14


 


Glucose (Fingerstick)


 138 mg/dL


(70-99) 147 mg/dL


(70-99) 


 159 mg/dL


(70-99)


 


White Blood Count


 


 


 16.0 x10^3/uL


(4.0-11.0) 





 


Red Blood Count


 


 


 2.99 x10^6/uL


(3.50-5.40) 





 


Hemoglobin


 


 


 8.8 g/dL


(12.0-15.5) 





 


Hematocrit


 


 


 26.6 %


(36.0-47.0) 





 


Mean Corpuscular Volume   89 fL ()  


 


Mean Corpuscular Hemoglobin   29 pg (25-35)  


 


Mean Corpuscular Hemoglobin


Concent 


 


 33 g/dL


(31-37) 





 


Red Cell Distribution Width


 


 


 14.8 %


(11.5-14.5) 





 


Platelet Count


 


 


 728 x10^3/uL


(140-400) 





 


Neutrophils (%) (Auto)   87 % (31-73)  


 


Lymphocytes (%) (Auto)   6 % (24-48)  


 


Monocytes (%) (Auto)   7 % (0-9)  


 


Eosinophils (%) (Auto)   1 % (0-3)  


 


Basophils (%) (Auto)   0 % (0-3)  


 


Neutrophils # (Auto)


 


 


 13.9 x10^3/uL


(1.8-7.7) 





 


Lymphocytes # (Auto)


 


 


 0.9 x10^3/uL


(1.0-4.8) 





 


Monocytes # (Auto)


 


 


 1.0 x10^3/uL


(0.0-1.1) 





 


Eosinophils # (Auto)


 


 


 0.1 x10^3/uL


(0.0-0.7) 





 


Basophils # (Auto)


 


 


 0.0 x10^3/uL


(0.0-0.2) 





 


Sodium Level


 


 


 142 mmol/L


(136-145) 





 


Potassium Level


 


 


 4.7 mmol/L


(3.5-5.1) 





 


Chloride Level


 


 


 107 mmol/L


() 





 


Carbon Dioxide Level


 


 


 31 mmol/L


(21-32) 





 


Anion Gap   4 (6-14)  


 


Blood Urea Nitrogen


 


 


 13 mg/dL


(7-20) 





 


Creatinine


 


 


 0.5 mg/dL


(0.6-1.0) 





 


Estimated GFR


(Cockcroft-Gault) 


 


 131.1 


 





 


Glucose Level


 


 


 169 mg/dL


(70-99) 





 


Calcium Level


 


 


 9.4 mg/dL


(8.5-10.1) 





 


Test


 7/11/20


11:17 7/11/20


17:27 7/12/20


00:26 7/12/20


05:15


 


Glucose (Fingerstick)


 144 mg/dL


(70-99) 116 mg/dL


(70-99) 120 mg/dL


(70-99) 





 


White Blood Count


 


 


 


 18.5 x10^3/uL


(4.0-11.0)


 


Red Blood Count


 


 


 


 2.99 x10^6/uL


(3.50-5.40)


 


Hemoglobin


 


 


 


 8.8 g/dL


(12.0-15.5)


 


Hematocrit


 


 


 


 26.3 %


(36.0-47.0)


 


Mean Corpuscular Volume    88 fL () 


 


Mean Corpuscular Hemoglobin    29 pg (25-35) 


 


Mean Corpuscular Hemoglobin


Concent 


 


 


 33 g/dL


(31-37)


 


Red Cell Distribution Width


 


 


 


 15.0 %


(11.5-14.5)


 


Platelet Count


 


 


 


 693 x10^3/uL


(140-400)


 


Neutrophils (%) (Auto)    82 % (31-73) 


 


Lymphocytes (%) (Auto)    10 % (24-48) 


 


Monocytes (%) (Auto)    7 % (0-9) 


 


Eosinophils (%) (Auto)    1 % (0-3) 


 


Basophils (%) (Auto)    0 % (0-3) 


 


Neutrophils # (Auto)


 


 


 


 15.1 x10^3/uL


(1.8-7.7)


 


Lymphocytes # (Auto)


 


 


 


 1.9 x10^3/uL


(1.0-4.8)


 


Monocytes # (Auto)


 


 


 


 1.3 x10^3/uL


(0.0-1.1)


 


Eosinophils # (Auto)


 


 


 


 0.1 x10^3/uL


(0.0-0.7)


 


Basophils # (Auto)


 


 


 


 0.0 x10^3/uL


(0.0-0.2)


 


Sodium Level


 


 


 


 139 mmol/L


(136-145)


 


Potassium Level


 


 


 


 4.6 mmol/L


(3.5-5.1)


 


Chloride Level


 


 


 


 103 mmol/L


()


 


Carbon Dioxide Level


 


 


 


 30 mmol/L


(21-32)


 


Anion Gap    6 (6-14) 


 


Blood Urea Nitrogen


 


 


 


 11 mg/dL


(7-20)


 


Creatinine


 


 


 


 0.5 mg/dL


(0.6-1.0)


 


Estimated GFR


(Cockcroft-Gault) 


 


 


 131.1 





 


BUN/Creatinine Ratio    22 (6-20) 


 


Glucose Level


 


 


 


 122 mg/dL


(70-99)


 


Calcium Level


 


 


 


 9.3 mg/dL


(8.5-10.1)


 


Total Bilirubin


 


 


 


 0.5 mg/dL


(0.2-1.0)


 


Aspartate Amino Transf


(AST/SGOT) 


 


 


 23 U/L (15-37) 





 


Alanine Aminotransferase


(ALT/SGPT) 


 


 


 35 U/L (14-59) 





 


Alkaline Phosphatase


 


 


 


 156 U/L


()


 


Total Protein


 


 


 


 4.8 g/dL


(6.4-8.2)


 


Albumin


 


 


 


 1.5 g/dL


(3.4-5.0)


 


Albumin/Globulin Ratio    0.5 (1.0-1.7) 








Laboratory Tests








Test


 7/11/20


11:17 7/11/20


17:27 7/12/20


00:26 7/12/20


05:15


 


Glucose (Fingerstick)


 144 mg/dL


(70-99) 116 mg/dL


(70-99) 120 mg/dL


(70-99) 





 


White Blood Count


 


 


 


 18.5 x10^3/uL


(4.0-11.0)


 


Red Blood Count


 


 


 


 2.99 x10^6/uL


(3.50-5.40)


 


Hemoglobin


 


 


 


 8.8 g/dL


(12.0-15.5)


 


Hematocrit


 


 


 


 26.3 %


(36.0-47.0)


 


Mean Corpuscular Volume    88 fL () 


 


Mean Corpuscular Hemoglobin    29 pg (25-35) 


 


Mean Corpuscular Hemoglobin


Concent 


 


 


 33 g/dL


(31-37)


 


Red Cell Distribution Width


 


 


 


 15.0 %


(11.5-14.5)


 


Platelet Count


 


 


 


 693 x10^3/uL


(140-400)


 


Neutrophils (%) (Auto)    82 % (31-73) 


 


Lymphocytes (%) (Auto)    10 % (24-48) 


 


Monocytes (%) (Auto)    7 % (0-9) 


 


Eosinophils (%) (Auto)    1 % (0-3) 


 


Basophils (%) (Auto)    0 % (0-3) 


 


Neutrophils # (Auto)


 


 


 


 15.1 x10^3/uL


(1.8-7.7)


 


Lymphocytes # (Auto)


 


 


 


 1.9 x10^3/uL


(1.0-4.8)


 


Monocytes # (Auto)


 


 


 


 1.3 x10^3/uL


(0.0-1.1)


 


Eosinophils # (Auto)


 


 


 


 0.1 x10^3/uL


(0.0-0.7)


 


Basophils # (Auto)


 


 


 


 0.0 x10^3/uL


(0.0-0.2)


 


Sodium Level


 


 


 


 139 mmol/L


(136-145)


 


Potassium Level


 


 


 


 4.6 mmol/L


(3.5-5.1)


 


Chloride Level


 


 


 


 103 mmol/L


()


 


Carbon Dioxide Level


 


 


 


 30 mmol/L


(21-32)


 


Anion Gap    6 (6-14) 


 


Blood Urea Nitrogen


 


 


 


 11 mg/dL


(7-20)


 


Creatinine


 


 


 


 0.5 mg/dL


(0.6-1.0)


 


Estimated GFR


(Cockcroft-Gault) 


 


 


 131.1 





 


BUN/Creatinine Ratio    22 (6-20) 


 


Glucose Level


 


 


 


 122 mg/dL


(70-99)


 


Calcium Level


 


 


 


 9.3 mg/dL


(8.5-10.1)


 


Total Bilirubin


 


 


 


 0.5 mg/dL


(0.2-1.0)


 


Aspartate Amino Transf


(AST/SGOT) 


 


 


 23 U/L (15-37) 





 


Alanine Aminotransferase


(ALT/SGPT) 


 


 


 35 U/L (14-59) 





 


Alkaline Phosphatase


 


 


 


 156 U/L


()


 


Total Protein


 


 


 


 4.8 g/dL


(6.4-8.2)


 


Albumin


 


 


 


 1.5 g/dL


(3.4-5.0)


 


Albumin/Globulin Ratio    0.5 (1.0-1.7) 








Medications





Active Scripts








 Medications  Dose


 Route/Sig


 Max Daily Dose Days Date Category


 


 Bisoprolol


 Fumarate 5 Mg


 Tablet  10 Mg


 PO DAILY


   3/16/20 Reported








Comments


ct reviewed 7/12/20, Decreased left-sided effusion after catheter placement. The




right-sided effusion has increased as has atelectasis.


 





 


There has been exchange or placement of multiple drainage 


tubes and a gastrojejunostomy tube. Both collections are smaller. No 


significant new abdominal fluid collection is seen. The jejunal component 


of the gastrojejunostomy tube appears to be looped in the proximal small 


bowel. 











ct abdomen /pelvis 6/6


1. Removal of the percutaneous pigtail drainage catheters since the prior 


exam. Sequela of pancreatitis with extensive pseudocysts again 


demonstrated, the right-sided collections are slightly larger since the 


prior exam, the left-sided collections are stable. See above.


2. Moderate to large left pleural effusion with atelectasis and collapse 


of most of the left lower lobe, stable. Small right pleural effusion is 


stable.


3. Gallstone.





ct chest 6/15 reviewed








 GRAM NEG COCCOBACILLI:MANY


        SQUAMOUS EPI CELL:RARE


        PMN (WBCs):FEW


        Unless otherwise specified, Testing Performed by:


        [address] [address]


        For Inquires, the Physician may contact the Microbiology


        department at [phone]





  RESPIRATORY CULTURE  Final  


        Final





        MANY GRAM NEGATIVE RODS on 06/15/20 at 1107


        FINAL ID= [PSEUDOMONAS AERUGINOSA]


        MICRO CHARGES


        PSEUDOMONAS AERUGINOSA





  ANTIMICROBIAL SUSCEPTIBILITY  Final  


        Comment





        NEG ANSON 56


        PSEUDOMONAS AERUGINOSA


        ANTIBIOTIC                        RESULT          INTERPRETATION


        AMIKACIN                          <=16                  S


        AZTREONAM                         <=4                   S


        CEFTAZIDIME                       <=1                   S


        CIPROFLOXACIN                     <=0.25                S


        CEFEPIME                          <=2                   S


        CEFTAZIDIME/AVIBACTAM             <=4                   S


        GENTAMICIN                        <=2                   S


        LEVOFLOXACIN                      <=0.5                 S





Impression


.





IMPRESSION:


1.  Acute hypoxemic respiratory failure secondary to ARDS status post trach, 

developed anemia 6/7, blood drainage from RLQ abdomen drain site, and 

surrounding firmness  / developed septic shock 6/7 from abdomen source, required

levo 6/7


s/p 3 new drains 6/7 with brown color drainage,  


2.  Gallstone pancreatitis, now with ongoing bleeding from prior drain. Anemic. 

s/p Tx multiple units over several days


3.  septic shock/sepsis, recurrent 6/7, source abdomen. new fever  ? aspiration 

pneumonitis/pneumonia


4.  Acute kidney injury-, Off HD--renal function decling. suspect JED on CKD due

to hypotension , improved now


5.  Acute gallstone pancreatitis.


6.  Hypoalbuminemia.


7.  Moderate persistent effusions, s/p left thora  5/12, reaccumulation of left 

effusion. O2 requirement not changed. 


8.  Fever- ,hypotension. suspect recurrent sepsis/ likely pancreatic source.  

Per ID, per surgery--


9.  Chronic anemia-- ongoing / s/p PRBC


10. Covid 19 testing negative


11. Moderate to large ascites-S/P paracentisis


12.S/P paracentisis with 4 liters removed on 4/15/20


13. S/P IR drain placement on 5/8/2020, removal, re inserted 6/7


14. Depression/Anxiety 


15. Increase effusion, ? loculated/ s/p chest tube.. drainage slowing down. 





6/30


S/P Exploratory laparotomy, lysis of adhesions, subtotal cholecystectomy with 

cholangiogram, gastrojejunostomy tube placement, pancreatic necrosectomy


leukocytosis- improving





Plan


.


cont trach, 02 titration 


tf on hold per surgery,? aspiration   has new fever


PT OT


Follow surgery input


DVT GI prophylaxis


ABX per ID 


merrem and micafungin 


Added vanc and cipro per id


f/u BC /resp cultures 


Continue TPN for nutrition support 


DVT/GI PPX


D/W RN and RT,











MAN BLAKE MD               Jul 12, 2020 10:42

## 2020-07-12 NOTE — RAD
The abdomen and pelvis without contrast 7/12/2020.

 

Reason for exam: Symptoms of infection. History of pancreatitis.

 

CT images were made through the chest abdomen and pelvis without oral or 

IV contrast. Exposure: One or more of the following individualized dose 

reduction techniques were utilized for this examination:  1. Automated 

exposure control  2. Adjustment of the mA and/or kV according to patient 

size  3. Use of iterative reconstruction technique. Comparison is made 

with a study of 6/15/2020.

 

CT chest findings: Bilateral pleural effusions persist along with adjacent

atelectasis. There is now a pigtail drainage catheter on the left 

posteriorly, and the amount of fluid is significantly reduced here. The 

right-sided effusion has increased some as has the adjacent atelectasis. 

The aerated portions of the lungs are relatively clear. A tracheostomy 

tube remains in place. No new enlarged lymph nodes are seen.

 

IMPRESSION: Decreased left-sided effusion after catheter placement. The 

right-sided effusion has increased as has atelectasis.

 

CT abdomen and pelvis: The liver and spleen are homogeneous in density. 

Evaluation of the solid organs is limited without IV contrast. There is 

now a drainage tube along the undersurface of the liver extending to the 

area of the gallbladder fossa. An NG tube present on the prior study has 

been removed. A pigtail drain that was present within a collection 

anterior to the pancreas also has been removed. There is now a larger bore

drainage tube entering the right abdomen and extending into the pancreatic

bed. A gastrostomy tube enters the left abdomen and lies in the stomach 

with an apparent jejunostomy tube looped in the distal duodenum and 

proximal jejunum. There are additional drains bilaterally in the abdominal

flanks. A pigtail catheter present previously on the left in this region 

has been removed. There is still a pigtail drain within the right iliac 

fossa. The collection in the region of the pancreatic bed has decreased in

size. AP thickness is about 2.8 cm compared with 5.3 cm previously. The 

collection in the left abdominal flank has decreased as well. Mediolateral

thickness is about 4.5 cm compared with about 6.5 cm previously. Right 

flank collection also appears smaller, particularly in its anterior aspect

near the kidney and proximal duodenum. Evaluation for smaller fluid 

collections is somewhat limited by lack of oral contrast. There is 

suggestion of a loculated collection in the upper anterior pelvis that is 

grossly similar to the prior exam. It shows a small amount of internal 

air, presumably from aspiration or presence of drainage tube. This likely 

communicates with the left flank collection. Images through the pelvis 

also show some fluid in the cul-de-sac that has similar volume. There is 

no evidence of bowel obstruction.

 

IMPRESSION: There has been exchange or placement of multiple drainage 

tubes and a gastrojejunostomy tube. Both collections are smaller. No 

significant new abdominal fluid collection is seen. The jejunal component 

of the gastrojejunostomy tube appears to be looped in the proximal small 

bowel.

 

Electronically signed by: Kirk Kamara Jr., MD (7/12/2020 7:14 AM) 

[id number]

## 2020-07-12 NOTE — NUR
1500 patient RR consistently 35-40's for 12 hrs. Seemed to be wearing out on the trach 
shield. Considering s/s of aspirating gastric contents yesterday and early this morning, 
decided to placed patient back on ventillator; PS 18/5 35% O2. Will monitor.

## 2020-07-12 NOTE — PDOC
Infectious Disease Note


Subjective


Subjective


She is requesting a pillow


+ tracheal secretions-bile color 


FiO2 33% satting 94%


Few BM yesterday per nursing 


Fever 101.9 - CT C/A/P done. cultures ordered





ROS


ROS


as mentioned above





Vital Sign


Vital Signs





Vital Signs








  Date Time  Temp Pulse Resp B/P (MAP) Pulse Ox O2 Delivery O2 Flow Rate FiO2


 


7/12/20 06:00  146 45 162/90 (114) 93 Trach shield  


 


7/12/20 05:11       6.0 


 


7/12/20 05:00 101.9       





 101.9       











Physical Exam


PHYSICAL EXAM


GENERAL: Propped up in bed, awake, weak appearing 


HEENT: Pupils equal, oral cavity dry. NGT out


NECK:  Tracheostomy 


LUNGS: Diminished aeration bases,  CT on left 


HEART:  S1, S2, regular, tachy 


ABDOMEN: Sightly less distended, bowel sounds hypoactive, soft, richardson x 2, 3 ROBERT

drains, G-J tube and + wound vac 


: [place] in place 


EXTREMITIES: Generalized edema, no cyanosis. SCDs & Podus boots bilaterally, 


SKIN: warm touch. No signs of rash.  


NEURO: awake, mouthing some words, tracking 


LUE-PICC without signs of complications 


LUE art-line out, mottling about old art-line site is improving. RP palpable, 

cap refill brisk.





Labs


Lab





Laboratory Tests








Test


 7/11/20


11:17 7/11/20


17:27 7/12/20


00:26 7/12/20


05:15


 


Glucose (Fingerstick)


 144 mg/dL


(70-99) 116 mg/dL


(70-99) 120 mg/dL


(70-99) 





 


White Blood Count


 


 


 


 18.5 x10^3/uL


(4.0-11.0)


 


Red Blood Count


 


 


 


 2.99 x10^6/uL


(3.50-5.40)


 


Hemoglobin


 


 


 


 8.8 g/dL


(12.0-15.5)


 


Hematocrit


 


 


 


 26.3 %


(36.0-47.0)


 


Mean Corpuscular Volume    88 fL () 


 


Mean Corpuscular Hemoglobin    29 pg (25-35) 


 


Mean Corpuscular Hemoglobin


Concent 


 


 


 33 g/dL


(31-37)


 


Red Cell Distribution Width


 


 


 


 15.0 %


(11.5-14.5)


 


Platelet Count


 


 


 


 693 x10^3/uL


(140-400)


 


Neutrophils (%) (Auto)    82 % (31-73) 


 


Lymphocytes (%) (Auto)    10 % (24-48) 


 


Monocytes (%) (Auto)    7 % (0-9) 


 


Eosinophils (%) (Auto)    1 % (0-3) 


 


Basophils (%) (Auto)    0 % (0-3) 


 


Neutrophils # (Auto)


 


 


 


 15.1 x10^3/uL


(1.8-7.7)


 


Lymphocytes # (Auto)


 


 


 


 1.9 x10^3/uL


(1.0-4.8)


 


Monocytes # (Auto)


 


 


 


 1.3 x10^3/uL


(0.0-1.1)


 


Eosinophils # (Auto)


 


 


 


 0.1 x10^3/uL


(0.0-0.7)


 


Basophils # (Auto)


 


 


 


 0.0 x10^3/uL


(0.0-0.2)


 


Sodium Level


 


 


 


 139 mmol/L


(136-145)


 


Potassium Level


 


 


 


 4.6 mmol/L


(3.5-5.1)


 


Chloride Level


 


 


 


 103 mmol/L


()


 


Carbon Dioxide Level


 


 


 


 30 mmol/L


(21-32)


 


Anion Gap    6 (6-14) 


 


Blood Urea Nitrogen


 


 


 


 11 mg/dL


(7-20)


 


Creatinine


 


 


 


 0.5 mg/dL


(0.6-1.0)


 


Estimated GFR


(Cockcroft-Gault) 


 


 


 131.1 





 


BUN/Creatinine Ratio    22 (6-20) 


 


Glucose Level


 


 


 


 122 mg/dL


(70-99)


 


Calcium Level


 


 


 


 9.3 mg/dL


(8.5-10.1)


 


Total Bilirubin


 


 


 


 0.5 mg/dL


(0.2-1.0)


 


Aspartate Amino Transf


(AST/SGOT) 


 


 


 23 U/L (15-37) 





 


Alanine Aminotransferase


(ALT/SGPT) 


 


 


 35 U/L (14-59) 





 


Alkaline Phosphatase


 


 


 


 156 U/L


()


 


Total Protein


 


 


 


 4.8 g/dL


(6.4-8.2)


 


Albumin


 


 


 


 1.5 g/dL


(3.4-5.0)


 


Albumin/Globulin Ratio    0.5 (1.0-1.7) 








CT A/P, 7/12


CT chest findings: Bilateral pleural effusions persist along with adjacent


atelectasis. There is now a pigtail drainage catheter on the left 


posteriorly, and the amount of fluid is significantly reduced here. The 


right-sided effusion has increased some as has the adjacent atelectasis. 


The aerated portions of the lungs are relatively clear. A tracheostomy 


tube remains in place. No new enlarged lymph nodes are seen.





IMPRESSION: There has been exchange or placement of multiple drainage 


tubes and a gastrojejunostomy tube. Both collections are smaller. No 


significant new abdominal fluid collection is seen. The jejunal component 


of the gastrojejunostomy tube appears to be looped in the proximal small 


bowel.


Micro


6/28. BLOOD CULTURE  Preliminary  


        NO GROWTH AFTER 5 DAYS                                 








6/30.  GRAM STAIN  Final  


        Final





        SQUAMOUS EPI CELL:RARE


        PMN (WBCs):FEW


        YEAST:FEW


        


 ANAEROBIC-AEROBIC CULTURE  Preliminary  


        Preliminary





       ANAEROBIC-AEROBIC CULTURE  Preliminary  


        Preliminary





        MANY YEAST on 07/03/20 at 1354


        FINAL ID= [NAHID PARAPSILOSIS]


        FEW


        FINAL ID= [PSEUDOMONAS AERUGINOSA]


        NAHID PARAPSILOSIS


        PSEUDOMONAS AERUGINOSA





Objective


Assessment


Patient with prolonged hospitalization more than 3 months


Multiple medical problems


Multiple surgical procedures





S/P Exp. 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 

parapsilosis & PSAE (I-merrem/ceftazidime/AZT/cefepime))


Leucocytosis -trending upward 


Fever 


Acute gallstone pancreatitis with persistent necrosis


  - 4/9.  CT A/P Increased ascites. Persistent evidence of necrotizing 

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


  - 4/27. status post ROBERT drain placement; C. parapsilosis. s/p drain 5/6 + yeast

& high amylase; s/p additional drain on 5/8. Drains removed. 


  -5/6. fluid  candida parapsilosis fluid, amylase high


  - 6/6 showed multiple pseudocysts, slight larger on the right. s/p drains x 3,

6/7.  + PSAE (MDRO-R Cefepime, Zosyn ANSON < 64) and yeast, 


  -6/7 s/p drain replacement x 3; fluid cult PSAE (MDRO), yeast; treated


  -7/12 CT A/P shows smaller fluid collections.         


Ascites s/p paracentesis 4/15 & 5/6. C. parapsilosis 


Cholelithiasis with thickening of the gallbladder wall.


JED, Hyperkalemia, Metabolic acidosis off dialysis


Acute hypoxic resp failure. trach/vent. sputum 6/13  + PSAE (I merrem) 


Pleural effusions s/p left thoracentesis, 5/12. no culture. s/p left chest tube,

6/15 no growth


Hypocalcemia 


Prediabetes


HTN


Anemia s/p PRBCs





Plan


Plan of Care


merrem and micafungin 


Add vanc per pharmacy protocal and cipro per Dr. Rosen


f/u BC /resp cultures 


Labs in am


wound care /drain management as directed


Contact isolation for CRE/MDRO


D/w nursing 





Critically ill





Mild Icreased Resp rate - d/w RN f/u cults





Attending Co-Sign


Attending Co-Sign


The patient was seen and interviewed as well as examined at the bedside. The 

chart was reviewed. The case was discussed. Agree with the plan of care.











HAVEN WINTERS        Jul 12, 2020 07:55


RASHAWN ROSEN MD              Jul 12, 2020 15:24

## 2020-07-13 VITALS — DIASTOLIC BLOOD PRESSURE: 77 MMHG | SYSTOLIC BLOOD PRESSURE: 119 MMHG

## 2020-07-13 VITALS — DIASTOLIC BLOOD PRESSURE: 73 MMHG | SYSTOLIC BLOOD PRESSURE: 111 MMHG

## 2020-07-13 VITALS — DIASTOLIC BLOOD PRESSURE: 96 MMHG | SYSTOLIC BLOOD PRESSURE: 154 MMHG

## 2020-07-13 VITALS — DIASTOLIC BLOOD PRESSURE: 84 MMHG | SYSTOLIC BLOOD PRESSURE: 149 MMHG

## 2020-07-13 VITALS — SYSTOLIC BLOOD PRESSURE: 110 MMHG | DIASTOLIC BLOOD PRESSURE: 60 MMHG

## 2020-07-13 VITALS — SYSTOLIC BLOOD PRESSURE: 123 MMHG | DIASTOLIC BLOOD PRESSURE: 78 MMHG

## 2020-07-13 VITALS — DIASTOLIC BLOOD PRESSURE: 74 MMHG | SYSTOLIC BLOOD PRESSURE: 148 MMHG

## 2020-07-13 VITALS — DIASTOLIC BLOOD PRESSURE: 75 MMHG | SYSTOLIC BLOOD PRESSURE: 124 MMHG

## 2020-07-13 VITALS — DIASTOLIC BLOOD PRESSURE: 69 MMHG | SYSTOLIC BLOOD PRESSURE: 108 MMHG

## 2020-07-13 VITALS — SYSTOLIC BLOOD PRESSURE: 138 MMHG | DIASTOLIC BLOOD PRESSURE: 79 MMHG

## 2020-07-13 VITALS — SYSTOLIC BLOOD PRESSURE: 135 MMHG | DIASTOLIC BLOOD PRESSURE: 77 MMHG

## 2020-07-13 VITALS — DIASTOLIC BLOOD PRESSURE: 75 MMHG | SYSTOLIC BLOOD PRESSURE: 140 MMHG

## 2020-07-13 VITALS — SYSTOLIC BLOOD PRESSURE: 125 MMHG | DIASTOLIC BLOOD PRESSURE: 77 MMHG

## 2020-07-13 VITALS — SYSTOLIC BLOOD PRESSURE: 142 MMHG | DIASTOLIC BLOOD PRESSURE: 97 MMHG

## 2020-07-13 VITALS — SYSTOLIC BLOOD PRESSURE: 119 MMHG | DIASTOLIC BLOOD PRESSURE: 78 MMHG

## 2020-07-13 VITALS — DIASTOLIC BLOOD PRESSURE: 66 MMHG | SYSTOLIC BLOOD PRESSURE: 113 MMHG

## 2020-07-13 VITALS — SYSTOLIC BLOOD PRESSURE: 134 MMHG | DIASTOLIC BLOOD PRESSURE: 94 MMHG

## 2020-07-13 VITALS — SYSTOLIC BLOOD PRESSURE: 138 MMHG | DIASTOLIC BLOOD PRESSURE: 98 MMHG

## 2020-07-13 VITALS — DIASTOLIC BLOOD PRESSURE: 86 MMHG | SYSTOLIC BLOOD PRESSURE: 140 MMHG

## 2020-07-13 VITALS — SYSTOLIC BLOOD PRESSURE: 135 MMHG | DIASTOLIC BLOOD PRESSURE: 80 MMHG

## 2020-07-13 VITALS — SYSTOLIC BLOOD PRESSURE: 131 MMHG | DIASTOLIC BLOOD PRESSURE: 85 MMHG

## 2020-07-13 VITALS — SYSTOLIC BLOOD PRESSURE: 140 MMHG | DIASTOLIC BLOOD PRESSURE: 95 MMHG

## 2020-07-13 VITALS — DIASTOLIC BLOOD PRESSURE: 81 MMHG | SYSTOLIC BLOOD PRESSURE: 116 MMHG

## 2020-07-13 VITALS — DIASTOLIC BLOOD PRESSURE: 86 MMHG | SYSTOLIC BLOOD PRESSURE: 134 MMHG

## 2020-07-13 LAB
ANION GAP SERPL CALC-SCNC: 4 MMOL/L (ref 6–14)
BASOPHILS # BLD AUTO: 0 X10^3/UL (ref 0–0.2)
BASOPHILS NFR BLD: 0 % (ref 0–3)
BUN SERPL-MCNC: 9 MG/DL (ref 7–20)
CALCIUM SERPL-MCNC: 8.5 MG/DL (ref 8.5–10.1)
CHLORIDE SERPL-SCNC: 104 MMOL/L (ref 98–107)
CO2 SERPL-SCNC: 29 MMOL/L (ref 21–32)
CREAT SERPL-MCNC: 0.5 MG/DL (ref 0.6–1)
EOSINOPHIL NFR BLD: 0.2 X10^3/UL (ref 0–0.7)
EOSINOPHIL NFR BLD: 2 % (ref 0–3)
ERYTHROCYTE [DISTWIDTH] IN BLOOD BY AUTOMATED COUNT: 14.9 % (ref 11.5–14.5)
GFR SERPLBLD BASED ON 1.73 SQ M-ARVRAT: 131.1 ML/MIN
GLUCOSE SERPL-MCNC: 113 MG/DL (ref 70–99)
HCT VFR BLD CALC: 21.7 % (ref 36–47)
HGB BLD-MCNC: 7.4 G/DL (ref 12–15.5)
LYMPHOCYTES # BLD: 0.8 X10^3/UL (ref 1–4.8)
LYMPHOCYTES NFR BLD AUTO: 8 % (ref 24–48)
MAGNESIUM SERPL-MCNC: 2.1 MG/DL (ref 1.8–2.4)
MCH RBC QN AUTO: 30 PG (ref 25–35)
MCHC RBC AUTO-ENTMCNC: 34 G/DL (ref 31–37)
MCV RBC AUTO: 88 FL (ref 79–100)
MONO #: 0.8 X10^3/UL (ref 0–1.1)
MONOCYTES NFR BLD: 8 % (ref 0–9)
NEUT #: 8.7 X10^3/UL (ref 1.8–7.7)
NEUTROPHILS NFR BLD AUTO: 83 % (ref 31–73)
PHOSPHATE SERPL-MCNC: 3.8 MG/DL (ref 2.6–4.7)
PLATELET # BLD AUTO: 543 X10^3/UL (ref 140–400)
POTASSIUM SERPL-SCNC: 4.3 MMOL/L (ref 3.5–5.1)
RBC # BLD AUTO: 2.48 X10^6/UL (ref 3.5–5.4)
SODIUM SERPL-SCNC: 137 MMOL/L (ref 136–145)
VANC TR: 25 MCG/ML (ref 10–20)
WBC # BLD AUTO: 10.5 X10^3/UL (ref 4–11)

## 2020-07-13 RX ADMIN — INSULIN LISPRO SCH UNITS: 100 INJECTION, SOLUTION INTRAVENOUS; SUBCUTANEOUS at 00:00

## 2020-07-13 RX ADMIN — IPRATROPIUM BROMIDE AND ALBUTEROL SULFATE SCH ML: .5; 3 SOLUTION RESPIRATORY (INHALATION) at 08:27

## 2020-07-13 RX ADMIN — INSULIN LISPRO SCH UNITS: 100 INJECTION, SOLUTION INTRAVENOUS; SUBCUTANEOUS at 12:00

## 2020-07-13 RX ADMIN — ENOXAPARIN SODIUM SCH MG: 40 INJECTION SUBCUTANEOUS at 09:13

## 2020-07-13 RX ADMIN — ACETYLCYSTEINE SCH MG: 200 INHALANT RESPIRATORY (INHALATION) at 08:28

## 2020-07-13 RX ADMIN — PROCHLORPERAZINE EDISYLATE PRN MG: 5 INJECTION INTRAMUSCULAR; INTRAVENOUS at 20:17

## 2020-07-13 RX ADMIN — MEROPENEM SCH MLS/HR: 500 INJECTION, POWDER, FOR SOLUTION INTRAVENOUS at 05:38

## 2020-07-13 RX ADMIN — ONDANSETRON PRN MG: 2 INJECTION INTRAMUSCULAR; INTRAVENOUS at 17:18

## 2020-07-13 RX ADMIN — DEXTROSE SCH MLS/HR: 50 INJECTION, SOLUTION INTRAVENOUS at 08:28

## 2020-07-13 RX ADMIN — BACITRACIN SCH MLS/HR: 5000 INJECTION, POWDER, FOR SOLUTION INTRAMUSCULAR at 14:33

## 2020-07-13 RX ADMIN — IPRATROPIUM BROMIDE AND ALBUTEROL SULFATE SCH ML: .5; 3 SOLUTION RESPIRATORY (INHALATION) at 04:40

## 2020-07-13 RX ADMIN — ONDANSETRON PRN MG: 2 INJECTION INTRAMUSCULAR; INTRAVENOUS at 04:30

## 2020-07-13 RX ADMIN — CIPROFLOXACIN SCH MLS/HR: 2 INJECTION, SOLUTION INTRAVENOUS at 08:31

## 2020-07-13 RX ADMIN — FENTANYL CITRATE PRN MCG: 50 INJECTION INTRAMUSCULAR; INTRAVENOUS at 20:07

## 2020-07-13 RX ADMIN — MEROPENEM SCH MLS/HR: 500 INJECTION, POWDER, FOR SOLUTION INTRAVENOUS at 17:19

## 2020-07-13 RX ADMIN — IPRATROPIUM BROMIDE AND ALBUTEROL SULFATE SCH ML: .5; 3 SOLUTION RESPIRATORY (INHALATION) at 19:54

## 2020-07-13 RX ADMIN — FENTANYL CITRATE PRN MCG: 50 INJECTION INTRAMUSCULAR; INTRAVENOUS at 05:39

## 2020-07-13 RX ADMIN — MEROPENEM SCH MLS/HR: 500 INJECTION, POWDER, FOR SOLUTION INTRAVENOUS at 11:46

## 2020-07-13 RX ADMIN — IPRATROPIUM BROMIDE AND ALBUTEROL SULFATE SCH ML: .5; 3 SOLUTION RESPIRATORY (INHALATION) at 16:02

## 2020-07-13 RX ADMIN — ACETYLCYSTEINE SCH MG: 200 INHALANT RESPIRATORY (INHALATION) at 19:55

## 2020-07-13 RX ADMIN — ONDANSETRON PRN MG: 2 INJECTION INTRAMUSCULAR; INTRAVENOUS at 12:18

## 2020-07-13 RX ADMIN — VANCOMYCIN HYDROCHLORIDE PRN EACH: 1 INJECTION, POWDER, LYOPHILIZED, FOR SOLUTION INTRAVENOUS at 13:00

## 2020-07-13 RX ADMIN — VANCOMYCIN HYDROCHLORIDE SCH MLS/HR: 1 INJECTION, POWDER, FOR SOLUTION INTRAVENOUS at 03:08

## 2020-07-13 RX ADMIN — INSULIN LISPRO SCH UNITS: 100 INJECTION, SOLUTION INTRAVENOUS; SUBCUTANEOUS at 05:38

## 2020-07-13 RX ADMIN — INSULIN LISPRO SCH UNITS: 100 INJECTION, SOLUTION INTRAVENOUS; SUBCUTANEOUS at 18:00

## 2020-07-13 RX ADMIN — FENTANYL SCH PATCH: 12.5 PATCH TRANSDERMAL at 10:01

## 2020-07-13 RX ADMIN — ACETAMINOPHEN PRN MG: 650 SUPPOSITORY RECTAL at 20:17

## 2020-07-13 RX ADMIN — Medication PRN EACH: at 11:15

## 2020-07-13 RX ADMIN — IPRATROPIUM BROMIDE AND ALBUTEROL SULFATE SCH ML: .5; 3 SOLUTION RESPIRATORY (INHALATION) at 12:00

## 2020-07-13 RX ADMIN — MEROPENEM SCH MLS/HR: 500 INJECTION, POWDER, FOR SOLUTION INTRAVENOUS at 00:34

## 2020-07-13 RX ADMIN — CIPROFLOXACIN SCH MLS/HR: 2 INJECTION, SOLUTION INTRAVENOUS at 21:19

## 2020-07-13 RX ADMIN — PANTOPRAZOLE SODIUM SCH MG: 40 INJECTION, POWDER, FOR SOLUTION INTRAVENOUS at 09:13

## 2020-07-13 NOTE — RAD
AP portable chest  radiograph 7/13/2020

 

Clinical History: Respiratory failure..

 

An AP erect portable digital radiograph of the chest was obtained.

 

Comparison study is dated 6/28/2020.

 

The tracheostomy tube and left arm PICC and the left-sided pleural 

drainage catheter are unchanged in position. The cardiac silhouette is 

mildly enlarged. The thoracic aorta is tortuous. There are small bilateral

pleural effusions essentially unchanged. Left lower lobe atelectasis 

and/or infiltrate is unchanged. Improving right lower lobe atelectasis 

and/or infiltrate is noted. No pneumothorax is seen. The osseous 

structures are unchanged.

 

Impression: Improving right lower lobe atelectasis and/or infiltrate.

 

Electronically signed by: Luis M López MD (7/13/2020 6:08 AM) [id number]

## 2020-07-13 NOTE — PDOC
PROGRESS NOTES


Assessment


Problems


Medical Problems:


(1) Acute pancreatitis


Status: Acute  





(2) Cholelithiasis


Status: Acute  





Critical illness neuromyopathy


Single seizure on 3/6, no recurrence.


Metabolic encephalopathy.


Fevers.


Metabolic acidosis.


Diffuse pulmonary infiltrate.


Pleural effusion.


Pancreatitis, necrotizing.


Gallstone.


Leukocytosis.


Lymphopenia.


Electrolytes imbalances.


Hyperglycemia.


DM.


HTN.


HLD.


Anemia.


Abnormal CXR.


Obesity.


Ascites and pleural effusion


Ileus with vomiting


Anemia 


JED


Hyperkalemia


Metabolic acidosis


Hypertension


S/P trache, back on vent


Anemia


S/P IR drain placement, 6/7


Hypotension, intermittently requiring Levophed


She had exploratory laparotomy, lysis of adhesions, subtotal cholecystectomy 

with cholangiogram, gastrojejunostomy tube placement, pancreatic necrosectomy on

6/30


Plan


Keppra if she has further seizures.


Treat medical diseases.


Subjective


none


Objective





Vital Signs








  Date Time  Temp Pulse Resp B/P (MAP) Pulse Ox O2 Delivery O2 Flow Rate FiO2


 


7/13/20 08:28     97 Ventilator  


 


7/13/20 06:00  116 20 148/74 (98)    


 


7/13/20 04:00 100.3       





 100.3       


 


7/12/20 14:00       9.0 














Intake and Output 


 


 7/13/20





 07:00


 


Intake Total 2834 ml


 


Output Total 3070 ml


 


Balance -236 ml


 


 


 


IV Total 2784 ml


 


Tube Feeding 50 ml


 


Output Urine Total 1130 ml


 


Gastric Drainage Total 400 ml


 


Drainage Total 1540 ml








PHYSICAL EXAM


Sedated (fentanyl patch), on vent


PERRL.


EOMI.


CN: no focal findings.


Muscle tone: normal.


Muscle strength:  minimal pain response


DTR: 1+


Plantar reflex: Silent


Gait: not examined in bed.


Sensory exam:  not testable


Cerebellar:  not testable


Review of Relevant


I have reviewed the following items josy (where applicable) has been applied.


Labs





Laboratory Tests








Test


 7/11/20


11:17 7/11/20


17:27 7/12/20


00:26 7/12/20


05:15


 


Glucose (Fingerstick)


 144 mg/dL


(70-99) 116 mg/dL


(70-99) 120 mg/dL


(70-99) 





 


White Blood Count


 


 


 


 18.5 x10^3/uL


(4.0-11.0)


 


Red Blood Count


 


 


 


 2.99 x10^6/uL


(3.50-5.40)


 


Hemoglobin


 


 


 


 8.8 g/dL


(12.0-15.5)


 


Hematocrit


 


 


 


 26.3 %


(36.0-47.0)


 


Mean Corpuscular Volume    88 fL () 


 


Mean Corpuscular Hemoglobin    29 pg (25-35) 


 


Mean Corpuscular Hemoglobin


Concent 


 


 


 33 g/dL


(31-37)


 


Red Cell Distribution Width


 


 


 


 15.0 %


(11.5-14.5)


 


Platelet Count


 


 


 


 693 x10^3/uL


(140-400)


 


Neutrophils (%) (Auto)    82 % (31-73) 


 


Lymphocytes (%) (Auto)    10 % (24-48) 


 


Monocytes (%) (Auto)    7 % (0-9) 


 


Eosinophils (%) (Auto)    1 % (0-3) 


 


Basophils (%) (Auto)    0 % (0-3) 


 


Neutrophils # (Auto)


 


 


 


 15.1 x10^3/uL


(1.8-7.7)


 


Lymphocytes # (Auto)


 


 


 


 1.9 x10^3/uL


(1.0-4.8)


 


Monocytes # (Auto)


 


 


 


 1.3 x10^3/uL


(0.0-1.1)


 


Eosinophils # (Auto)


 


 


 


 0.1 x10^3/uL


(0.0-0.7)


 


Basophils # (Auto)


 


 


 


 0.0 x10^3/uL


(0.0-0.2)


 


Sodium Level


 


 


 


 139 mmol/L


(136-145)


 


Potassium Level


 


 


 


 4.6 mmol/L


(3.5-5.1)


 


Chloride Level


 


 


 


 103 mmol/L


()


 


Carbon Dioxide Level


 


 


 


 30 mmol/L


(21-32)


 


Anion Gap    6 (6-14) 


 


Blood Urea Nitrogen


 


 


 


 11 mg/dL


(7-20)


 


Creatinine


 


 


 


 0.5 mg/dL


(0.6-1.0)


 


Estimated GFR


(Cockcroft-Gault) 


 


 


 131.1 





 


BUN/Creatinine Ratio    22 (6-20) 


 


Glucose Level


 


 


 


 122 mg/dL


(70-99)


 


Calcium Level


 


 


 


 9.3 mg/dL


(8.5-10.1)


 


Total Bilirubin


 


 


 


 0.5 mg/dL


(0.2-1.0)


 


Aspartate Amino Transf


(AST/SGOT) 


 


 


 23 U/L (15-37) 





 


Alanine Aminotransferase


(ALT/SGPT) 


 


 


 35 U/L (14-59) 





 


Alkaline Phosphatase


 


 


 


 156 U/L


()


 


Total Protein


 


 


 


 4.8 g/dL


(6.4-8.2)


 


Albumin


 


 


 


 1.5 g/dL


(3.4-5.0)


 


Albumin/Globulin Ratio    0.5 (1.0-1.7) 


 


Test


 7/12/20


12:38 7/12/20


17:58 7/13/20


00:21 7/13/20


05:35


 


Glucose (Fingerstick)


 302 mg/dL


(70-99) 106 mg/dL


(70-99) 124 mg/dL


(70-99) 





 


White Blood Count


 


 


 


 10.5 x10^3/uL


(4.0-11.0)


 


Red Blood Count


 


 


 


 2.48 x10^6/uL


(3.50-5.40)


 


Hemoglobin


 


 


 


 7.4 g/dL


(12.0-15.5)


 


Hematocrit


 


 


 


 21.7 %


(36.0-47.0)


 


Mean Corpuscular Volume    88 fL () 


 


Mean Corpuscular Hemoglobin    30 pg (25-35) 


 


Mean Corpuscular Hemoglobin


Concent 


 


 


 34 g/dL


(31-37)


 


Red Cell Distribution Width


 


 


 


 14.9 %


(11.5-14.5)


 


Platelet Count


 


 


 


 543 x10^3/uL


(140-400)


 


Neutrophils (%) (Auto)    83 % (31-73) 


 


Lymphocytes (%) (Auto)    8 % (24-48) 


 


Monocytes (%) (Auto)    8 % (0-9) 


 


Eosinophils (%) (Auto)    2 % (0-3) 


 


Basophils (%) (Auto)    0 % (0-3) 


 


Neutrophils # (Auto)


 


 


 


 8.7 x10^3/uL


(1.8-7.7)


 


Lymphocytes # (Auto)


 


 


 


 0.8 x10^3/uL


(1.0-4.8)


 


Monocytes # (Auto)


 


 


 


 0.8 x10^3/uL


(0.0-1.1)


 


Eosinophils # (Auto)


 


 


 


 0.2 x10^3/uL


(0.0-0.7)


 


Basophils # (Auto)


 


 


 


 0.0 x10^3/uL


(0.0-0.2)


 


Sodium Level


 


 


 


 137 mmol/L


(136-145)


 


Potassium Level


 


 


 


 4.3 mmol/L


(3.5-5.1)


 


Chloride Level


 


 


 


 104 mmol/L


()


 


Carbon Dioxide Level


 


 


 


 29 mmol/L


(21-32)


 


Anion Gap    4 (6-14) 


 


Blood Urea Nitrogen    9 mg/dL (7-20) 


 


Creatinine


 


 


 


 0.5 mg/dL


(0.6-1.0)


 


Estimated GFR


(Cockcroft-Gault) 


 


 


 131.1 





 


Glucose Level


 


 


 


 113 mg/dL


(70-99)


 


Calcium Level


 


 


 


 8.5 mg/dL


(8.5-10.1)


 


Phosphorus Level


 


 


 


 3.8 mg/dL


(2.6-4.7)


 


Magnesium Level


 


 


 


 2.1 mg/dL


(1.8-2.4)


 


Test


 7/13/20


05:37 


 


 





 


Glucose (Fingerstick)


 110 mg/dL


(70-99) 


 


 











Laboratory Tests








Test


 7/12/20


12:38 7/12/20


17:58 7/13/20


00:21 7/13/20


05:35


 


Glucose (Fingerstick)


 302 mg/dL


(70-99) 106 mg/dL


(70-99) 124 mg/dL


(70-99) 





 


White Blood Count


 


 


 


 10.5 x10^3/uL


(4.0-11.0)


 


Red Blood Count


 


 


 


 2.48 x10^6/uL


(3.50-5.40)


 


Hemoglobin


 


 


 


 7.4 g/dL


(12.0-15.5)


 


Hematocrit


 


 


 


 21.7 %


(36.0-47.0)


 


Mean Corpuscular Volume    88 fL () 


 


Mean Corpuscular Hemoglobin    30 pg (25-35) 


 


Mean Corpuscular Hemoglobin


Concent 


 


 


 34 g/dL


(31-37)


 


Red Cell Distribution Width


 


 


 


 14.9 %


(11.5-14.5)


 


Platelet Count


 


 


 


 543 x10^3/uL


(140-400)


 


Neutrophils (%) (Auto)    83 % (31-73) 


 


Lymphocytes (%) (Auto)    8 % (24-48) 


 


Monocytes (%) (Auto)    8 % (0-9) 


 


Eosinophils (%) (Auto)    2 % (0-3) 


 


Basophils (%) (Auto)    0 % (0-3) 


 


Neutrophils # (Auto)


 


 


 


 8.7 x10^3/uL


(1.8-7.7)


 


Lymphocytes # (Auto)


 


 


 


 0.8 x10^3/uL


(1.0-4.8)


 


Monocytes # (Auto)


 


 


 


 0.8 x10^3/uL


(0.0-1.1)


 


Eosinophils # (Auto)


 


 


 


 0.2 x10^3/uL


(0.0-0.7)


 


Basophils # (Auto)


 


 


 


 0.0 x10^3/uL


(0.0-0.2)


 


Sodium Level


 


 


 


 137 mmol/L


(136-145)


 


Potassium Level


 


 


 


 4.3 mmol/L


(3.5-5.1)


 


Chloride Level


 


 


 


 104 mmol/L


()


 


Carbon Dioxide Level


 


 


 


 29 mmol/L


(21-32)


 


Anion Gap    4 (6-14) 


 


Blood Urea Nitrogen    9 mg/dL (7-20) 


 


Creatinine


 


 


 


 0.5 mg/dL


(0.6-1.0)


 


Estimated GFR


(Cockcroft-Gault) 


 


 


 131.1 





 


Glucose Level


 


 


 


 113 mg/dL


(70-99)


 


Calcium Level


 


 


 


 8.5 mg/dL


(8.5-10.1)


 


Phosphorus Level


 


 


 


 3.8 mg/dL


(2.6-4.7)


 


Magnesium Level


 


 


 


 2.1 mg/dL


(1.8-2.4)


 


Test


 7/13/20


05:37 


 


 





 


Glucose (Fingerstick)


 110 mg/dL


(70-99) 


 


 











Microbiology


7/12/20 Blood Culture - Preliminary, Resulted


          NO GROWTH AFTER 1 DAY


6/30/20 Gram Stain - Final, Complete


          


6/30/20 Aerobic and Anaerobic Culture - Final, Complete


          


6/30/20 Antimicrobic Susceptibility - Final, Complete


          


6/15/20 Gram Stain - Final, Complete


          


6/15/20 Aerobic and Anaerobic Culture - Final, Complete


          


6/13/20 Gram Stain Evaluation - Final, Complete


          


6/13/20 Respiratory Culture - Final, Complete


          


6/13/20 Antimicrobic Susceptibility - Final, Complete


          


6/7/20 Urine Culture - Final, Complete


         


5/30/20 Gram Stain - Final, Complete


          


5/30/20 Aerobic Culture - Final, Complete


Medications





Current Medications


Sodium Chloride 1,000 ml @  1,000 mls/hr Q1H IV  Last administered on 3/16/20at 

03:00;  Start 3/16/20 at 03:00;  Stop 3/16/20 at 03:59;  Status DC


Ondansetron HCl (Zofran) 4 mg 1X  ONCE IVP  Last administered on 3/16/20at 

03:27;  Start 3/16/20 at 03:00;  Stop 3/16/20 at 03:01;  Status DC


Morphine Sulfate (Morphine Sulfate) 4 mg 1X  ONCE IV ;  Start 3/16/20 at 03:00; 

Stop 3/16/20 at 03:01;  Status Cancel


Ketorolac Tromethamine (Toradol 30mg Vial) 30 mg 1X  ONCE IV  Last administered 

on 3/16/20at 02:54;  Start 3/16/20 at 03:00;  Stop 3/16/20 at 03:01;  Status DC


Fentanyl Citrate (Fentanyl 2ml Vial) 25 mcg 1X  ONCE IVP  Last administered on 

3/16/20at 03:23;  Start 3/16/20 at 03:30;  Stop 3/16/20 at 03:31;  Status DC


Fentanyl Citrate (Fentanyl 2ml Vial) 100 mcg STK-MED ONCE .ROUTE ;  Start 

3/16/20 at 03:18;  Stop 3/16/20 at 03:18;  Status DC


Iohexol (Omnipaque 350 Mg/ml) 90 ml 1X  ONCE IV  Last administered on 3/16/20at 

03:25;  Start 3/16/20 at 03:30;  Stop 3/16/20 at 03:31;  Status DC


Info (CONTRAST GIVEN -- Rx MONITORING) 1 each PRN DAILY  PRN MC SEE COMMENTS;  

Start 3/16/20 at 03:30;  Stop 3/18/20 at 03:29;  Status DC


Hydromorphone HCl (Dilaudid) 0.5 mg 1X  ONCE IV  Last administered on 3/16/20at 

03:55;  Start 3/16/20 at 04:30;  Stop 3/16/20 at 04:32;  Status DC


Ondansetron HCl (Zofran) 4 mg PRN Q8HRS  PRN IV NAUSEA/VOMITING 1ST CHOICE;  

Start 3/16/20 at 05:00;  Stop 3/16/20 at 09:27;  Status DC


Morphine Sulfate (Morphine Sulfate) 2 mg PRN Q2HR  PRN IV SEVERE PAIN 7-10 Last 

administered on 3/17/20at 12:26;  Start 3/16/20 at 05:00;  Stop 3/17/20 at 

14:15;  Status DC


Sodium Chloride 1,000 ml @  125 mls/hr Q8H IV  Last administered on 3/16/20at 

20:56;  Start 3/16/20 at 05:00;  Stop 3/17/20 at 04:59;  Status DC


Hydromorphone HCl (Dilaudid) 0.5 mg PRN Q3HRS  PRN IV SEVERE PAIN 7-10 Last 

administered on 3/17/20at 10:06;  Start 3/16/20 at 05:00;  Stop 3/17/20 at 

12:01;  Status DC


Piperacillin Sod/ Tazobactam Sod 4.5 gm/Sodium Chloride 100 ml @  200 mls/hr 1X 

ONCE IV  Last administered on 3/16/20at 05:44;  Start 3/16/20 at 06:00;  Stop 

3/16/20 at 06:29;  Status DC


Ondansetron HCl (Zofran) 4 mg PRN Q4HRS  PRN IV NAUSEA/VOMITING 1ST CHOICE Last 

administered on 7/13/20at 04:30;  Start 3/16/20 at 09:30


Insulin Human Lispro (HumaLOG) 0-9 UNITS Q6HRS SQ  Last administered on 

7/12/20at 12:43;  Start 3/16/20 at 09:30


Dextrose (Dextrose 50%-Water Syringe) 12.5 gm PRN Q15MIN  PRN IV SEE COMMENTS;  

Start 3/16/20 at 09:30


Pantoprazole Sodium (PROTONIX VIAL for IV PUSH) 40 mg DAILYAC IVP  Last 

administered on 7/13/20at 09:13;  Start 3/16/20 at 11:30


Prochlorperazine Edisylate (Compazine) 10 mg PRN Q6HRS  PRN IV NAUSEA/VOMITING, 

2nd CHOICE Last administered on 7/12/20at 15:48;  Start 3/16/20 at 17:45


Atenolol (Tenormin) 100 mg DAILY PO ;  Start 3/17/20 at 09:00;  Stop 3/16/20 at 

20:08;  Status DC


Metoprolol Tartrate (Lopressor Vial) 2.5 mg Q6HRS IVP  Last administered on 

3/17/20at 05:51;  Start 3/16/20 at 20:15;  Stop 3/17/20 at 10:02;  Status DC


Metoprolol Tartrate (Lopressor Vial) 5 mg Q6HRS IVP  Last administered on 

3/26/20at 00:12;  Start 3/17/20 at 10:15;  Stop 3/28/20 at 08:48;  Status DC


Hydromorphone HCl (Dilaudid) 1 mg PRN Q3HRS  PRN IV SEVERE PAIN 7-10 Last 

administered on 3/23/20at 05:13;  Start 3/17/20 at 12:00;  Stop 3/31/20 at 

00:25;  Status DC


Lidocaine HCl (Buffered Lidocaine 1%) 3 ml STK-MED ONCE .ROUTE ;  Start 3/17/20 

at 12:55;  Stop 3/17/20 at 12:56;  Status DC


Albumin Human 500 ml @  125 mls/hr 1X  ONCE IV  Last administered on 3/17/20at 

14:33;  Start 3/17/20 at 14:30;  Stop 3/17/20 at 18:32;  Status DC


Norepinephrine Bitartrate 8 mg/ Dextrose 258 ml @  17.299 mls/ hr CONT  PRN IV 

PER PROTOCOL Last administered on 4/14/20at 12:48;  Start 3/17/20 at 15:30;  

Stop 4/17/20 at 09:19;  Status DC


Sodium Chloride 1,000 ml @  125 mls/hr Q8H IV  Last administered on 3/17/20at 

21:04;  Start 3/17/20 at 16:00;  Stop 3/18/20 at 02:42;  Status DC


Albumin Human 500 ml @  125 mls/hr PRN BID  PRN IV After every 2L NSS & BP < 

90mm Last administered on 6/30/20at 16:06;  Start 3/17/20 at 16:00;  Stop 7/3/20

at 09:30;  Status DC


Iohexol (Omnipaque 300 Mg/ml) 60 ml 1X  ONCE IV  Last administered on 3/17/20at 

17:20;  Start 3/17/20 at 17:00;  Stop 3/17/20 at 17:01;  Status DC


Info (CONTRAST GIVEN -- Rx MONITORING) 1 each PRN DAILY  PRN MC SEE COMMENTS;  

Start 3/17/20 at 17:00;  Stop 3/19/20 at 16:59;  Status DC


Meropenem 1 gm/ Sodium Chloride 100 ml @  200 mls/hr Q8HRS IV  Last administered

on 3/18/20at 05:45;  Start 3/17/20 at 20:00;  Stop 3/18/20 at 08:48;  Status DC


Furosemide (Lasix) 40 mg 1X  ONCE IVP  Last administered on 3/17/20at 22:12;  

Start 3/17/20 at 22:30;  Stop 3/17/20 at 22:31;  Status DC


Calcium Chloride 1000 mg/Sodium Chloride 110 ml @  220 mls/hr 1X  ONCE IV  Last 

administered on 3/17/20at 22:11;  Start 3/17/20 at 22:30;  Stop 3/17/20 at 

22:59;  Status DC


Albuterol Sulfate (Ventolin Neb Soln) 2.5 mg 1X  ONCE NEB  Last administered on 

3/18/20at 00:56;  Start 3/17/20 at 22:30;  Stop 3/17/20 at 22:31;  Status DC


Insulin Human Regular (HumuLIN R VIAL) 5 unit 1X  ONCE IV  Last administered on 

3/17/20at 22:14;  Start 3/17/20 at 22:30;  Stop 3/17/20 at 22:31;  Status DC


Magnesium Sulfate 50 ml @ 25 mls/hr 1X  ONCE IV  Last administered on 3/18/20at 

02:57;  Start 3/18/20 at 03:00;  Stop 3/18/20 at 04:59;  Status DC


Calcium Gluconate 1000 mg/Sodium Chloride 110 ml @  220 mls/hr 1X  ONCE IV  Last

administered on 3/18/20at 02:46;  Start 3/18/20 at 03:00;  Stop 3/18/20 at 

03:29;  Status DC


Sodium Chloride 1,000 ml @  200 mls/hr Q5H IV  Last administered on 3/18/20at 

02:46;  Start 3/18/20 at 03:00;  Stop 3/18/20 at 10:21;  Status DC


Calcium Gluconate 1000 mg/Sodium Chloride 110 ml @  220 mls/hr 1X  ONCE IV  Last

administered on 3/18/20at 03:21;  Start 3/18/20 at 03:30;  Stop 3/18/20 at 

03:59;  Status DC


Sodium Bicarbonate 50 meq/Sodium Chloride 1,050 ml @  75 mls/hr Q14H IV  Last 

administered on 3/22/20at 21:10;  Start 3/18/20 at 07:30;  Stop 3/23/20 at 

10:28;  Status DC


Calcium Gluconate 2000 mg/Sodium Chloride 120 ml @  220 mls/hr 1X  ONCE IV  Last

administered on 3/18/20at 09:05;  Start 3/18/20 at 07:30;  Stop 3/18/20 at 

08:02;  Status DC


Lidocaine HCl (Xylocaine-Mpf 1% 2ml Vial) 2 ml STK-MED ONCE .ROUTE ;  Start 

3/18/20 at 08:47;  Stop 3/18/20 at 08:47;  Status DC


Meropenem 500 mg/ Sodium Chloride 50 ml @  100 mls/hr Q12HR IV  Last 

administered on 3/23/20at 21:01;  Start 3/18/20 at 18:00;  Stop 3/24/20 at 

07:58;  Status DC


Lidocaine HCl (Buffered Lidocaine 1%) 3 ml STK-MED ONCE .ROUTE ;  Start 3/18/20 

at 09:46;  Stop 3/18/20 at 09:46;  Status DC


Lidocaine HCl (Buffered Lidocaine 1%) 6 ml 1X  ONCE INJ  Last administered on 

3/18/20at 10:26;  Start 3/18/20 at 10:15;  Stop 3/18/20 at 10:16;  Status DC


Info (Tpn Per Pharmacy) 1 each PRN DAILY  PRN MC SEE COMMENTS Last administered 

on 7/12/20at 09:52;  Start 3/18/20 at 12:00


Sodium Chloride 1,000 ml @  1,000 mls/hr Q1H PRN IV hypotension;  Start 3/18/20 

at 12:07;  Stop 3/18/20 at 18:06;  Status DC


Diphenhydramine HCl (Benadryl) 25 mg 1X PRN  PRN IV ITCHING;  Start 3/18/20 at 

12:15;  Stop 3/19/20 at 12:14;  Status DC


Diphenhydramine HCl (Benadryl) 25 mg 1X PRN  PRN IV ITCHING;  Start 3/18/20 at 

12:15;  Stop 3/19/20 at 12:14;  Status DC


Sodium Chloride 1,000 ml @  400 mls/hr Q2H30M PRN IV PATENCY;  Start 3/18/20 at 

12:07;  Stop 3/19/20 at 00:06;  Status DC


Info (PHARMACY MONITORING -- do not chart) 1 each PRN DAILY  PRN MC SEE COMME

NTS;  Start 3/18/20 at 12:15;  Stop 3/20/20 at 08:13;  Status DC


Sodium Chloride 90 meq/Calcium Gluconate 10 meq/ Multivitamins 10 ml/Chromium/ 

Copper/Manganese/ Seleni/Zn 1 ml/ Total Parenteral Nutrition/Amino 

Acids/Dextrose/ Fat Emulsion Intravenous 55.005 ml  @ 2.292 mls/hr TPN  CONT IV 

;  Start 3/18/20 at 22:00;  Stop 3/18/20 at 12:33;  Status DC


Info (Tpn Per Pharmacy) 1 each PRN DAILY  PRN MC SEE COMMENTS;  Start 3/18/20 at

12:30;  Status UNV


Sodium Chloride 90 meq/Calcium Gluconate 10 meq/ Multivitamins 10 ml/Chromium/ 

Copper/Manganese/ Seleni/Zn 0.5 ml/ Total Parenteral Nutrition/Amino 

Acids/Dextrose/ Fat Emulsion Intravenous 1,512 ml @  63 mls/hr TPN  CONT IV  

Last administered on 3/18/20at 22:06;  Start 3/18/20 at 22:00;  Stop 3/19/20 at 

21:59;  Status DC


Calcium Carbonate/ Glycine (Tums) 500 mg PRN AFTMEALHC  PRN PO INDIGESTION;  

Start 3/18/20 at 17:45;  Stop 5/13/20 at 10:25;  Status DC


Calcium Gluconate (Calcium Gluconate) 2,000 mg 1X  ONCE IVP  Last administered 

on 3/19/20at 02:19;  Start 3/19/20 at 02:15;  Stop 3/19/20 at 02:16;  Status DC


Calcium Chloride 3000 mg/Sodium Chloride 1,030 ml @  50 mls/hr Q61H42H IV  Last 

administered on 3/21/20at 02:17;  Start 3/19/20 at 08:00;  Stop 3/21/20 at 

15:23;  Status DC


Lorazepam (Ativan Inj) 1 mg PRN Q4HRS  PRN IVP ANXIETY / AGITATION, 2nd choic 

Last administered on 4/17/20at 03:51;  Start 3/19/20 at 09:00;  Stop 4/17/20 at 

09:19;  Status DC


Sodium Chloride 1,000 ml @  1,000 mls/hr Q1H PRN IV hypotension;  Start 3/19/20 

at 08:56;  Stop 3/19/20 at 14:55;  Status DC


Albumin Human 200 ml @  200 mls/hr 1X PRN  PRN IV Hypotension;  Start 3/19/20 at

09:00;  Stop 3/19/20 at 14:59;  Status DC


Diphenhydramine HCl (Benadryl) 25 mg 1X PRN  PRN IV ITCHING;  Start 3/19/20 at 

09:00;  Stop 3/20/20 at 08:59;  Status DC


Diphenhydramine HCl (Benadryl) 25 mg 1X PRN  PRN IV ITCHING;  Start 3/19/20 at 

09:00;  Stop 3/20/20 at 08:59;  Status DC


Sodium Chloride 1,000 ml @  400 mls/hr Q2H30M PRN IV PATENCY;  Start 3/19/20 at 

08:56;  Stop 3/19/20 at 20:55;  Status DC


Info (PHARMACY MONITORING -- do not chart) 1 each PRN DAILY  PRN MC SEE 

COMMENTS;  Start 3/19/20 at 09:00;  Status UNV


Info (PHARMACY MONITORING -- do not chart) 1 each PRN DAILY  PRN MC SEE 

COMMENTS;  Start 3/19/20 at 09:00;  Stop 3/20/20 at 08:13;  Status DC


Digoxin (Lanoxin) 500 mcg 1X  ONCE IV  Last administered on 3/19/20at 10:04;  

Start 3/19/20 at 10:00;  Stop 3/19/20 at 10:01;  Status DC


Digoxin (Lanoxin) 125 mcg 1X  ONCE IV  Last administered on 3/19/20at 17:10;  

Start 3/19/20 at 18:00;  Stop 3/19/20 at 18:01;  Status DC


Magnesium Sulfate 100 ml @  25 mls/hr 1X  ONCE IV  Last administered on 

3/19/20at 12:48;  Start 3/19/20 at 13:00;  Stop 3/19/20 at 16:59;  Status DC


Sodium Chloride 90 meq/Magnesium Sulfate 10 meq/ Calcium Gluconate 20 meq/ 

Multivitamins 10 ml/Chromium/ Copper/Manganese/ Seleni/Zn 0.5 ml/ Total 

Parenteral Nutrition/Amino Acids/Dextrose/ Fat Emulsion Intravenous 1,512 ml @  

63 mls/hr TPN  CONT IV  Last administered on 3/19/20at 22:25;  Start 3/19/20 at 

22:00;  Stop 3/20/20 at 21:59;  Status DC


Sodium Chloride 1,000 ml @  1,000 mls/hr Q1H PRN IV hypotension;  Start 3/20/20 

at 08:05;  Stop 3/20/20 at 14:04;  Status DC


Albumin Human 200 ml @  200 mls/hr 1X  ONCE IV  Last administered on 3/20/20at 

08:57;  Start 3/20/20 at 08:15;  Stop 3/20/20 at 09:14;  Status DC


Diphenhydramine HCl (Benadryl) 25 mg 1X PRN  PRN IV ITCHING;  Start 3/20/20 at 

08:15;  Stop 3/21/20 at 08:14;  Status DC


Diphenhydramine HCl (Benadryl) 25 mg 1X PRN  PRN IV ITCHING;  Start 3/20/20 at 

08:15;  Stop 3/21/20 at 08:14;  Status DC


Sodium Chloride 1,000 ml @  400 mls/hr Q2H30M PRN IV PATENCY;  Start 3/20/20 at 

08:05;  Stop 3/20/20 at 20:04;  Status DC


Info (PHARMACY MONITORING -- do not chart) 1 each PRN DAILY  PRN MC SEE 

COMMENTS;  Start 3/20/20 at 08:15;  Stop 3/24/20 at 07:57;  Status DC


Sodium Chloride 90 meq/Potassium Chloride 15 meq/ Potassium Phosphate 10 mmol/ 

Magnesium Sulfate 10 meq/Calcium Gluconate 20 meq/ Multivitamins 10 ml/Chromium/

Copper/Manganese/ Seleni/Zn 0.5 ml/ Total Parenteral Nutrition/Amino 

Acids/Dextrose/ Fat Emulsion Intravenous 1,512 ml @  63 mls/hr TPN  CONT IV  

Last administered on 3/20/20at 21:01;  Start 3/20/20 at 22:00;  Stop 3/21/20 at 

21:59;  Status DC


Potassium Chloride/Water 100 ml @  100 mls/hr 1X  ONCE IV  Last administered on 

3/20/20at 14:09;  Start 3/20/20 at 14:00;  Stop 3/20/20 at 14:59;  Status DC


Benzocaine (Hurricaine One) 1 spray 1X  ONCE MM  Last administered on 3/20/20at 

16:38;  Start 3/20/20 at 14:30;  Stop 3/20/20 at 14:31;  Status DC


Lidocaine HCl (Glydo (Lidocaine) Jelly) 1 thomas 1X  ONCE MM  Last administered on 

3/20/20at 16:38;  Start 3/20/20 at 14:30;  Stop 3/20/20 at 14:31;  Status DC


Linezolid/Dextrose 300 ml @  300 mls/hr Q12HR IV  Last administered on 3/26/20at

21:04;  Start 3/20/20 at 20:00;  Stop 3/27/20 at 07:50;  Status DC


Acetaminophen (Tylenol) 650 mg PRN Q6HRS  PRN PO MILD PAIN / TEMP;  Start 

3/21/20 at 03:30;  Stop 3/21/20 at 03:36;  Status DC


Acetaminophen (Tylenol) 650 mg PRN Q6HRS  PRN PEG MILD PAIN / TEMP Last 

administered on 4/16/20at 19:56;  Start 3/21/20 at 03:36;  Stop 5/13/20 at 

10:25;  Status DC


Sodium Chloride 1,000 ml @  1,000 mls/hr Q1H PRN IV hypotension;  Start 3/21/20 

at 07:50;  Stop 3/21/20 at 13:49;  Status DC


Albumin Human 200 ml @  200 mls/hr 1X PRN  PRN IV Hypotension;  Start 3/21/20 at

08:00;  Stop 3/21/20 at 13:59;  Status DC


Sodium Chloride (Normal Saline Flush) 10 ml 1X PRN  PRN IV AP catheter pack;  

Start 3/21/20 at 08:00;  Stop 3/22/20 at 07:59;  Status DC


Sodium Chloride (Normal Saline Flush) 10 ml 1X PRN  PRN IV  catheter pack;  

Start 3/21/20 at 08:00;  Stop 3/22/20 at 07:59;  Status DC


Sodium Chloride 1,000 ml @  400 mls/hr Q2H30M PRN IV PATENCY;  Start 3/21/20 at 

07:50;  Stop 3/21/20 at 19:49;  Status DC


Info (PHARMACY MONITORING -- do not chart) 1 each PRN DAILY  PRN MC SEE 

COMMENTS;  Start 3/21/20 at 08:00;  Status UNV


Info (PHARMACY MONITORING -- do not chart) 1 each PRN DAILY  PRN MC SEE 

COMMENTS;  Start 3/21/20 at 08:00;  Stop 3/23/20 at 08:25;  Status DC


Sodium Chloride 90 meq/Potassium Chloride 15 meq/ Potassium Phosphate 10 mmol/ 

Magnesium Sulfate 10 meq/Calcium Gluconate 20 meq/ Multivitamins 10 ml/Chromium/

Copper/Manganese/ Seleni/Zn 0.5 ml/ Total Parenteral Nutrition/Amino 

Acids/Dextrose/ Fat Emulsion Intravenous 1,512 ml @  63 mls/hr TPN  CONT IV  

Last administered on 3/21/20at 20:57;  Start 3/21/20 at 22:00;  Stop 3/22/20 at 

21:59;  Status DC


Sodium Chloride 90 meq/Potassium Chloride 15 meq/ Potassium Phosphate 15 mmol/ 

Magnesium Sulfate 10 meq/Calcium Gluconate 20 meq/ Multivitamins 10 ml/Chromium/

Copper/Manganese/ Seleni/Zn 0.5 ml/ Total Parenteral Nutrition/Amino 

Acids/Dextrose/ Fat Emulsion Intravenous 1,512 ml @  63 mls/hr TPN  CONT IV ;  

Start 3/22/20 at 22:00;  Stop 3/22/20 at 14:16;  Status DC


Sodium Chloride 90 meq/Potassium Chloride 15 meq/ Potassium Phosphate 15 mmol/ 

Magnesium Sulfate 10 meq/Calcium Gluconate 20 meq/ Multivitamins 10 ml/Chromium/

Copper/Manganese/ Seleni/Zn 0.5 ml/ Total Parenteral Nutrition/Amino 

Acids/Dextrose/ Fat Emulsion Intravenous 1,200 ml @  50 mls/hr TPN  CONT IV ;  

Start 3/22/20 at 22:00;  Stop 3/22/20 at 14:17;  Status DC


Sodium Chloride 90 meq/Potassium Chloride 15 meq/ Potassium Phosphate 10 mmol/ 

Magnesium Sulfate 10 meq/Calcium Gluconate 20 meq/ Multivitamins 10 ml/Chromium/

Copper/Manganese/ Seleni/Zn 0.5 ml/ Total Parenteral Nutrition/Amino Acids

/Dextrose/ Fat Emulsion Intravenous 1,200 ml @  50 mls/hr TPN  CONT IV  Last 

administered on 3/22/20at 23:29;  Start 3/22/20 at 22:00;  Stop 3/23/20 at 

21:59;  Status DC


Sodium Chloride 1,000 ml @  1,000 mls/hr Q1H PRN IV hypotension;  Start 3/23/20 

at 07:28;  Stop 3/23/20 at 13:27;  Status DC


Albumin Human 200 ml @  200 mls/hr 1X  ONCE IV  Last administered on 3/23/20at 

08:51;  Start 3/23/20 at 07:30;  Stop 3/23/20 at 08:29;  Status DC


Diphenhydramine HCl (Benadryl) 25 mg 1X PRN  PRN IV ITCHING;  Start 3/23/20 at 

07:30;  Stop 3/24/20 at 07:29;  Status DC


Diphenhydramine HCl (Benadryl) 25 mg 1X PRN  PRN IV ITCHING;  Start 3/23/20 at 

07:30;  Stop 3/24/20 at 07:29;  Status DC


Sodium Chloride 1,000 ml @  400 mls/hr Q2H30M PRN IV PATENCY;  Start 3/23/20 at 

07:28;  Stop 3/23/20 at 19:27;  Status DC


Info (PHARMACY MONITORING -- do not chart) 1 each PRN DAILY  PRN MC SEE 

COMMENTS;  Start 3/23/20 at 07:30;  Stop 4/3/20 at 13:01;  Status DC


Metronidazole 100 ml @  100 mls/hr Q6HRS IV  Last administered on 4/8/20at 06:

26;  Start 3/23/20 at 08:30;  Stop 4/8/20 at 09:58;  Status DC


Micafungin Sodium 100 mg/Dextrose 100 ml @  100 mls/hr Q24H IV  Last 

administered on 4/30/20at 08:18;  Start 3/23/20 at 09:00;  Stop 4/30/20 at 

20:58;  Status DC


Propofol 0 ml @ As Directed STK-MED ONCE IV ;  Start 3/23/20 at 07:53;  Stop 

3/23/20 at 07:53;  Status DC


Etomidate (Amidate) 20 mg STK-MED ONCE IV ;  Start 3/23/20 at 07:53;  Stop 

3/23/20 at 07:54;  Status DC


Midazolam HCl (Versed) 5 mg STK-MED ONCE .ROUTE ;  Start 3/23/20 at 07:57;  Stop

3/23/20 at 07:57;  Status DC


Fentanyl Citrate 30 ml @ 0 mls/hr CONT  PRN IV SEE PROTOCOL Last administered on

4/17/20at 06:12;  Start 3/23/20 at 08:15;  Stop 4/17/20 at 09:19;  Status DC


Artificial Tears (Artificial Tears) 1 drop PRN Q1HR  PRN OU DRY EYE, 1st choice;

 Start 3/23/20 at 08:15;  Stop 4/29/20 at 05:31;  Status DC


Midazolam HCl 50 mg/Sodium Chloride 50 ml @ 0 mls/hr CONT  PRN IV SEE PROTOCOL 

Last administered on 3/26/20at 22:39;  Start 3/23/20 at 08:15;  Stop 3/28/20 at 

15:59;  Status DC


Etomidate (Amidate) 8 mg 1X  ONCE IV  Last administered on 3/23/20at 08:33;  

Start 3/23/20 at 08:30;  Stop 3/23/20 at 08:31;  Status DC


Succinylcholine Chloride (Anectine) 120 mg 1X  ONCE IV  Last administered on 

3/23/20at 08:34;  Start 3/23/20 at 08:30;  Stop 3/23/20 at 08:31;  Status DC


Midazolam HCl (Versed) 5 mg 1X  ONCE IV ;  Start 3/23/20 at 08:30;  Stop 3/23/20

at 08:31;  Status DC


Potassium Chloride 15 meq/ Bicarbonate Dialysis Soln w/ out KCl 5,007.5 ml  @ 

1,000 mls/ hr Q5H1M IV  Last administered on 3/24/20at 11:11;  Start 3/23/20 at 

12:00;  Stop 3/24/20 at 11:15;  Status DC


Potassium Chloride 15 meq/ Bicarbonate Dialysis Soln w/ out KCl 5,007.5 ml  @ 

1,000 mls/ hr Q5H1M IV  Last administered on 3/24/20at 11:12;  Start 3/23/20 at 

12:00;  Stop 3/24/20 at 11:17;  Status DC


Potassium Chloride 15 meq/ Bicarbonate Dialysis Soln w/ out KCl 5,007.5 ml  @ 

1,000 mls/ hr Q5H1M IV  Last administered on 3/24/20at 11:11;  Start 3/23/20 at 

12:00;  Stop 3/24/20 at 11:19;  Status DC


Sodium Chloride 90 meq/Potassium Chloride 15 meq/ Potassium Phosphate 10 mmol/ 

Magnesium Sulfate 10 meq/Calcium Gluconate 20 meq/ Multivitamins 10 ml/Chromium/

Copper/Manganese/ Seleni/Zn 0.5 ml/ Total Parenteral Nutrition/Amino 

Acids/Dextrose/ Fat Emulsion Intravenous 1,400 ml @  58.333 mls/ hr TPN  CONT IV

 Last administered on 3/23/20at 21:42;  Start 3/23/20 at 22:00;  Stop 3/24/20 at

21:59;  Status DC


Heparin Sodium (Porcine) (Heparin Sodium) 5,000 unit Q8HRS SQ  Last administered

on 3/28/20at 05:55;  Start 3/23/20 at 15:00;  Stop 3/28/20 at 13:28;  Status DC


Meropenem 500 mg/ Sodium Chloride 50 ml @  100 mls/hr Q6HRS IV  Last 

administered on 3/25/20at 06:00;  Start 3/24/20 at 09:00;  Stop 3/25/20 at 

07:29;  Status DC


Potassium Phosphate 20 mmol/ Sodium Chloride 106.6667 ml @  51.667 m... 1X  ONCE

IV  Last administered on 3/24/20at 11:22;  Start 3/24/20 at 10:15;  Stop 3/24/20

at 12:18;  Status DC


Acetaminophen (Tylenol Supp) 650 mg PRN Q6HRS  PRN AR MILD PAIN / TEMP > 100.3'F

Last administered on 7/12/20at 17:59;  Start 3/24/20 at 10:30


Potassium Chloride/Water 100 ml @  100 mls/hr Q1H IV  Last administered on 

3/24/20at 12:12;  Start 3/24/20 at 11:00;  Stop 3/24/20 at 12:59;  Status DC


Potassium Chloride 20 meq/ Bicarbonate Dialysis Soln w/ out KCl 5,010 ml @  

1,000 mls/hr Q5H1M IV  Last administered on 3/25/20at 08:48;  Start 3/24/20 at 

12:00;  Stop 3/25/20 at 13:03;  Status DC


Potassium Chloride 20 meq/ Bicarbonate Dialysis Soln w/ out KCl 5,010 ml @  

1,000 mls/hr Q5H1M IV  Last administered on 3/29/20at 14:52;  Start 3/24/20 at 

11:30;  Stop 3/29/20 at 19:59;  Status DC


Potassium Chloride 20 meq/ Bicarbonate Dialysis Soln w/ out KCl 5,010 ml @  

1,000 mls/hr Q5H1M IV  Last administered on 3/29/20at 14:53;  Start 3/24/20 at 

11:30;  Stop 3/29/20 at 19:59;  Status DC


Sodium Chloride 90 meq/Potassium Chloride 15 meq/ Potassium Phosphate 15 mmol/ 

Magnesium Sulfate 10 meq/Calcium Gluconate 15 meq/ Multivitamins 10 ml/Chromium/

Copper/Manganese/ Seleni/Zn 0.5 ml/ Total Parenteral Nutrition/Amino 

Acids/Dextrose/ Fat Emulsion Intravenous 1,400 ml @  58.333 mls/ hr TPN  CONT IV

 Last administered on 3/24/20at 22:17;  Start 3/24/20 at 22:00;  Stop 3/25/20 at

21:59;  Status DC


Cefepime HCl (Maxipime) 2 gm Q12HR IVP  Last administered on 4/7/20at 20:56;  

Start 3/25/20 at 09:00;  Stop 4/8/20 at 09:58;  Status DC


Daptomycin 500 mg/ Sodium Chloride 50 ml @  100 mls/hr Q48H IV  Last 

administered on 4/10/20at 09:57;  Start 3/25/20 at 08:30;  Stop 4/10/20 at 

10:07;  Status DC


Lidocaine HCl (Buffered Lidocaine 1%) 3 ml 1X  ONCE INJ  Last administered on 

3/25/20at 10:27;  Start 3/25/20 at 10:30;  Stop 3/25/20 at 10:31;  Status DC


Potassium Phosphate 20 mmol/ Sodium Chloride 106.6667 ml @  51.667 m... 1X  ONCE

IV  Last administered on 3/25/20at 12:51;  Start 3/25/20 at 13:00;  Stop 3/25/20

at 15:03;  Status DC


Sodium Chloride 90 meq/Potassium Chloride 15 meq/ Potassium Phosphate 18 mmol/ 

Magnesium Sulfate 8 meq/Calcium Gluconate 15 meq/ Multivitamins 10 ml/Chromium/ 

Copper/Manganese/ Seleni/Zn 0.5 ml/ Total Parenteral Nutrition/Amino 

Acids/Dextrose/ Fat Emulsion Intravenous 1,400 ml @  58.333 mls/ hr TPN  CONT IV

 Last administered on 3/25/20at 22:16;  Start 3/25/20 at 22:00;  Stop 3/26/20 at

21:59;  Status DC


Potassium Chloride 20 meq/ Bicarbonate Dialysis Soln w/ out KCl 5,010 ml @  

1,000 mls/hr Q5H1M IV  Last administered on 3/29/20at 14:54;  Start 3/25/20 at 

16:00;  Stop 3/29/20 at 19:59;  Status DC


Multi-Ingred Cream/Lotion/Oil/ Oint (Artificial Tears Eye Ointment) 1 thomas PRN 

Q1HR  PRN OU DRY EYE, 2nd choice Last administered on 4/13/20at 08:19;  Start 

3/25/20 at 17:30;  Stop 6/3/20 at 14:39;  Status DC


Sodium Chloride 90 meq/Potassium Chloride 15 meq/ Potassium Phosphate 18 mmol/ 

Magnesium Sulfate 8 meq/Calcium Gluconate 15 meq/ Multivitamins 10 ml/Chromium/ 

Copper/Manganese/ Seleni/Zn 0.5 ml/ Total Parenteral Nutrition/Amino 

Acids/Dextrose/ Fat Emulsion Intravenous 1,400 ml @  58.333 mls/ hr TPN  CONT IV

 Last administered on 3/26/20at 22:00;  Start 3/26/20 at 22:00;  Stop 3/27/20 at

21:59;  Status DC


Albumin Human 500 ml @  125 mls/hr 1X  ONCE IV ;  Start 3/26/20 at 14:15;  Stop 

3/26/20 at 18:14;  Status DC


Sodium Chloride 90 meq/Potassium Chloride 15 meq/ Potassium Phosphate 18 mmol/ 

Magnesium Sulfate 8 meq/Calcium Gluconate 15 meq/ Multivitamins 10 ml/Chromium/ 

Copper/Manganese/ Seleni/Zn 0.5 ml/ Insulin Human Regular 10 unit/ Total 

Parenteral Nutrition/Amino Acids/Dextrose/ Fat Emulsion Intravenous 1,400 ml @  

58.333 mls/ hr TPN  CONT IV  Last administered on 3/27/20at 21:43;  Start 

3/27/20 at 22:00;  Stop 3/28/20 at 21:59;  Status DC


Lidocaine HCl (Buffered Lidocaine 1%) 3 ml STK-MED ONCE .ROUTE ;  Start 3/25/20 

at 10:00;  Stop 3/27/20 at 13:57;  Status DC


Midazolam HCl 100 mg/Sodium Chloride 100 ml @ 7 mls/hr CONT  PRN IV SEE PROTOCOL

Last administered on 4/8/20at 15:35;  Start 3/28/20 at 16:00;  Stop 6/3/20 at 

14:38;  Status DC


Sodium Chloride 90 meq/Potassium Chloride 15 meq/ Potassium Phosphate 18 mmol/ 

Magnesium Sulfate 8 meq/Calcium Gluconate 15 meq/ Multivitamins 10 ml/Chromium/ 

Copper/Manganese/ Seleni/Zn 0.5 ml/ Insulin Human Regular 15 unit/ Total Parent

eral Nutrition/Amino Acids/Dextrose/ Fat Emulsion Intravenous 1,400 ml @  58.333

mls/ hr TPN  CONT IV  Last administered on 3/28/20at 20:34;  Start 3/28/20 at 

22:00;  Stop 3/29/20 at 21:59;  Status DC


Info ([facility] Electrolyte Protocol) 1 ea CONT PRN  PRN MC PER PROTOCOL;  Start 

3/29/20 at 13:15


Sodium Chloride 90 meq/Potassium Chloride 15 meq/ Potassium Phosphate 18 mmol/ 

Magnesium Sulfate 8 meq/Calcium Gluconate 15 meq/ Multivitamins 10 ml/Chromium/ 

Copper/Manganese/ Seleni/Zn 0.5 ml/ Insulin Human Regular 15 unit/ Total 

Parenteral Nutrition/Amino Acids/Dextrose/ Fat Emulsion Intravenous 1,400 ml @  

58.333 mls/ hr TPN  CONT IV  Last administered on 3/29/20at 22:05;  Start 

3/29/20 at 22:00;  Stop 3/30/20 at 21:59;  Status DC


Potassium Chloride 15 meq/ Bicarbonate Dialysis Soln w/ out KCl 5,007.5 ml  @ 

1,000 mls/ hr Q5H1M IV  Last administered on 4/1/20at 18:14;  Start 3/29/20 at 

20:00;  Stop 4/2/20 at 13:08;  Status DC


Potassium Chloride 15 meq/ Bicarbonate Dialysis Soln w/ out KCl 5,007.5 ml  @ 

1,000 mls/ hr Q5H1M IV  Last administered on 4/1/20at 18:14;  Start 3/29/20 at 

20:00;  Stop 4/2/20 at 13:08;  Status DC


Potassium Chloride 15 meq/ Bicarbonate Dialysis Soln w/ out KCl 5,007.5 ml  @ 

1,000 mls/ hr Q5H1M IV  Last administered on 4/1/20at 18:14;  Start 3/29/20 at 

20:00;  Stop 4/2/20 at 13:08;  Status DC


Iohexol (Omnipaque 240 Mg/ml) 30 ml 1X  ONCE PO  Last administered on 3/30/20at 

11:30;  Start 3/30/20 at 11:30;  Stop 3/30/20 at 11:33;  Status DC


Info (CONTRAST GIVEN -- Rx MONITORING) 1 each PRN DAILY  PRN MC SEE COMMENTS;  

Start 3/30/20 at 11:45;  Stop 4/1/20 at 11:44;  Status DC


Sodium Chloride 90 meq/Potassium Chloride 15 meq/ Potassium Phosphate 18 mmol/ 

Magnesium Sulfate 8 meq/Calcium Gluconate 15 meq/ Multivitamins 10 ml/Chromium/ 

Copper/Manganese/ Seleni/Zn 0.5 ml/ Insulin Human Regular 15 unit/ Total 

Parenteral Nutrition/Amino Acids/Dextrose/ Fat Emulsion Intravenous 1,400 ml @  

58.333 mls/ hr TPN  CONT IV  Last administered on 3/30/20at 21:47;  Start 

3/30/20 at 22:00;  Stop 3/31/20 at 21:59;  Status DC


Sodium Chloride 90 meq/Potassium Chloride 15 meq/ Potassium Phosphate 18 mmol/ 

Magnesium Sulfate 8 meq/Calcium Gluconate 15 meq/ Multivitamins 10 ml/Chromium/ 

Copper/Manganese/ Seleni/Zn 0.5 ml/ Insulin Human Regular 20 unit/ Total 

Parenteral Nutrition/Amino Acids/Dextrose/ Fat Emulsion Intravenous 1,400 ml @  

58.333 mls/ hr TPN  CONT IV  Last administered on 3/31/20at 21:36;  Start 

3/31/20 at 22:00;  Stop 4/1/20 at 21:59;  Status DC


Alteplase, Recombinant (Cathflo For Central Catheter Clearance) 1 mg 1X  ONCE 

INT CAT  Last administered on 3/31/20at 20:03;  Start 3/31/20 at 19:30;  Stop 

3/31/20 at 19:46;  Status DC


Alteplase, Recombinant (Cathflo For Central Catheter Clearance) 1 mg 1X  ONCE 

INT CAT  Last administered on 3/31/20at 22:05;  Start 3/31/20 at 22:00;  Stop 

3/31/20 at 22:01;  Status DC


Sodium Chloride 90 meq/Potassium Chloride 15 meq/ Potassium Phosphate 18 mmol/ 

Magnesium Sulfate 8 meq/Calcium Gluconate 15 meq/ Multivitamins 10 ml/Chromium/ 

Copper/Manganese/ Seleni/Zn 0.5 ml/ Insulin Human Regular 20 unit/ Total 

Parenteral Nutrition/Amino Acids/Dextrose/ Fat Emulsion Intravenous 1,400 ml @  

58.333 mls/ hr TPN  CONT IV  Last administered on 4/1/20at 21:30;  Start 4/1/20 

at 22:00;  Stop 4/2/20 at 21:59;  Status DC


Dexmedetomidine HCl 400 mcg/ Sodium Chloride 100 ml @ 0 mls/hr CONT  PRN IV 

ANXIETY / AGITATION Last administered on 5/30/20at 12:57;  Start 4/2/20 at 

08:15;  Stop 5/30/20 at 18:31;  Status DC


Sodium Chloride 500 ml @  500 mls/hr 1X PRN  PRN IV ELEVATED BP, SEE COMMENTS;  

Start 4/2/20 at 08:15


Atropine Sulfate (ATROPINE 0.5mg SYRINGE) 0.5 mg PRN Q5MIN  PRN IV SEE COMMENTS;

 Start 4/2/20 at 08:15


Furosemide (Lasix) 20 mg 1X  ONCE IVP  Last administered on 4/2/20at 08:19;  

Start 4/2/20 at 08:15;  Stop 4/2/20 at 08:16;  Status DC


Lidocaine HCl (Buffered Lidocaine 1%) 3 ml STK-MED ONCE .ROUTE ;  Start 4/2/20 

at 08:39;  Stop 4/2/20 at 08:39;  Status DC


Lidocaine HCl (Buffered Lidocaine 1%) 6 ml 1X  ONCE INJ  Last administered on 

4/2/20at 09:05;  Start 4/2/20 at 09:00;  Stop 4/2/20 at 09:06;  Status DC


Sodium Chloride 90 meq/Potassium Chloride 15 meq/ Potassium Phosphate 18 mmol/ 

Magnesium Sulfate 8 meq/Calcium Gluconate 15 meq/ Multivitamins 10 ml/Chromium/ 

Copper/Manganese/ Seleni/Zn 0.5 ml/ Insulin Human Regular 20 unit/ Total 

Parenteral Nutrition/Amino Acids/Dextrose/ Fat Emulsion Intravenous 1,400 ml @  

58.333 mls/ hr TPN  CONT IV  Last administered on 4/2/20at 22:45;  Start 4/2/20 

at 22:00;  Stop 4/3/20 at 21:59;  Status DC


Sodium Chloride 1,000 ml @  1,000 mls/hr Q1H PRN IV hypotension;  Start 4/3/20 

at 07:30;  Stop 4/3/20 at 13:29;  Status DC


Albumin Human 200 ml @  200 mls/hr 1X PRN  PRN IV Hypotension Last administered 

on 4/3/20at 09:36;  Start 4/3/20 at 07:30;  Stop 4/3/20 at 13:29;  Status DC


Sodium Chloride (Normal Saline Flush) 10 ml 1X PRN  PRN IV AP catheter pack;  

Start 4/3/20 at 07:30;  Stop 4/3/20 at 21:29;  Status DC


Sodium Chloride (Normal Saline Flush) 10 ml 1X PRN  PRN IV  catheter pack;  

Start 4/3/20 at 07:30;  Stop 4/4/20 at 07:29;  Status DC


Sodium Chloride 1,000 ml @  400 mls/hr Q2H30M PRN IV PATENCY;  Start 4/3/20 at 

07:30;  Stop 4/3/20 at 19:29;  Status DC


Info (PHARMACY MONITORING -- do not chart) 1 each PRN DAILY  PRN MC SEE 

COMMENTS;  Start 4/3/20 at 07:30;  Stop 4/3/20 at 13:02;  Status DC


Info (PHARMACY MONITORING -- do not chart) 1 each PRN DAILY  PRN MC SEE 

COMMENTS;  Start 4/3/20 at 07:30;  Stop 4/5/20 at 12:45;  Status DC


Sodium Chloride 90 meq/Potassium Chloride 15 meq/ Potassium Phosphate 10 mmol/ 

Magnesium Sulfate 8 meq/Calcium Gluconate 15 meq/ Multivitamins 10 ml/Chromium/ 

Copper/Manganese/ Seleni/Zn 0.5 ml/ Insulin Human Regular 25 unit/ Total 

Parenteral Nutrition/Amino Acids/Dextrose/ Fat Emulsion Intravenous 1,400 ml @  

58.333 mls/ hr TPN  CONT IV  Last administered on 4/3/20at 22:19;  Start 4/3/20 

at 22:00;  Stop 4/4/20 at 21:59;  Status DC


Heparin Sodium (Porcine) (Heparin Sodium) 5,000 unit Q12HR SQ  Last administered

on 4/26/20at 08:59;  Start 4/3/20 at 21:00;  Stop 4/26/20 at 10:05;  Status DC


Ondansetron HCl (Zofran) 4 mg PRN Q6HRS  PRN IV NAUSEA/VOMITING;  Start 4/6/20 

at 07:00;  Stop 4/7/20 at 06:59;  Status DC


Fentanyl Citrate (Fentanyl 2ml Vial) 25 mcg PRN Q5MIN  PRN IV MILD PAIN 1-3;  

Start 4/6/20 at 07:00;  Stop 4/7/20 at 06:59;  Status DC


Fentanyl Citrate (Fentanyl 2ml Vial) 50 mcg PRN Q5MIN  PRN IV MODERATE TO SEVERE

PAIN;  Start 4/6/20 at 07:00;  Stop 4/7/20 at 06:59;  Status DC


Ringer's Solution 1,000 ml @  30 mls/hr Q24H IV ;  Start 4/6/20 at 07:00;  Stop 

4/6/20 at 18:59;  Status DC


Lidocaine HCl (Xylocaine-Mpf 1% 2ml Vial) 2 ml PRN 1X  PRN ID PRIOR TO IV START;

 Start 4/6/20 at 07:00;  Stop 4/7/20 at 06:59;  Status DC


Prochlorperazine Edisylate (Compazine) 5 mg PACU PRN  PRN IV NAUSEA, MRX1;  

Start 4/6/20 at 07:00;  Stop 4/7/20 at 06:59;  Status DC


Sodium Chloride 1,000 ml @  1,000 mls/hr Q1H PRN IV hypotension;  Start 4/4/20 

at 09:10;  Stop 4/4/20 at 15:09;  Status DC


Albumin Human 200 ml @  200 mls/hr 1X PRN  PRN IV Hypotension Last administered 

on 4/4/20at 10:10;  Start 4/4/20 at 09:15;  Stop 4/4/20 at 15:14;  Status DC


Sodium Chloride 1,000 ml @  400 mls/hr Q2H30M PRN IV PATENCY;  Start 4/4/20 at 

09:10;  Stop 4/4/20 at 21:09;  Status DC


Info (PHARMACY MONITORING -- do not chart) 1 each PRN DAILY  PRN MC SEE 

COMMENTS;  Start 4/4/20 at 09:15;  Stop 4/5/20 at 12:45;  Status DC


Info (PHARMACY MONITORING -- do not chart) 1 each PRN DAILY  PRN MC SEE 

COMMENTS;  Start 4/4/20 at 09:15;  Stop 4/5/20 at 12:45;  Status DC


Sodium Chloride 90 meq/Potassium Chloride 15 meq/ Potassium Phosphate 10 mmol/ 

Magnesium Sulfate 8 meq/Calcium Gluconate 15 meq/ Multivitamins 10 ml/Chromium/ 

Copper/Manganese/ Seleni/Zn 0.5 ml/ Insulin Human Regular 25 unit/ Total 

Parenteral Nutrition/Amino Acids/Dextrose/ Fat Emulsion Intravenous 1,400 ml @  

58.333 mls/ hr TPN  CONT IV  Last administered on 4/4/20at 22:10;  Start 4/4/20 

at 22:00;  Stop 4/5/20 at 21:59;  Status DC


Magnesium Sulfate 50 ml @ 25 mls/hr PRN DAILY  PRN IV for Mag < 1.7 on am labs 

Last administered on 6/18/20at 10:57;  Start 4/5/20 at 09:15


Sodium Chloride 90 meq/Potassium Chloride 15 meq/ Potassium Phosphate 10 mmol/ 

Magnesium Sulfate 8 meq/Calcium Gluconate 15 meq/ Multivitamins 10 ml/Chromium/ 

Copper/Manganese/ Seleni/Zn 0.5 ml/ Insulin Human Regular 25 unit/ Total 

Parenteral Nutrition/Amino Acids/Dextrose/ Fat Emulsion Intravenous 1,400 ml @  

58.333 mls/ hr TPN  CONT IV  Last administered on 4/5/20at 21:20;  Start 4/5/20 

at 22:00;  Stop 4/6/20 at 21:59;  Status DC


Sodium Chloride 1,000 ml @  1,000 mls/hr Q1H PRN IV hypotension;  Start 4/5/20 

at 12:23;  Stop 4/5/20 at 18:22;  Status DC


Albumin Human 200 ml @  200 mls/hr 1X  ONCE IV  Last administered on 4/5/20at 

13:34;  Start 4/5/20 at 12:30;  Stop 4/5/20 at 13:29;  Status DC


Diphenhydramine HCl (Benadryl) 25 mg 1X PRN  PRN IV ITCHING;  Start 4/5/20 at 

12:30;  Stop 4/6/20 at 12:29;  Status DC


Diphenhydramine HCl (Benadryl) 25 mg 1X PRN  PRN IV ITCHING;  Start 4/5/20 at 

12:30;  Stop 4/6/20 at 12:29;  Status DC


Info (PHARMACY MONITORING -- do not chart) 1 each PRN DAILY  PRN MC SEE 

COMMENTS;  Start 4/5/20 at 12:30;  Status Cancel


Bupivacaine HCl/ Epinephrine Bitart (Sensorcain-Epi 0.5%-1:879146 Mpf) 30 ml 

STK-MED ONCE .ROUTE  Last administered on 4/6/20at 11:44;  Start 4/6/20 at 

11:00;  Stop 4/6/20 at 11:01;  Status DC


Cellulose (Surgicel Fibrillar 1x2) 1 each STK-MED ONCE .ROUTE ;  Start 4/6/20 at

11:00;  Stop 4/6/20 at 11:01;  Status DC


Sodium Chloride 90 meq/Potassium Chloride 15 meq/ Potassium Phosphate 10 mmol/ 

Magnesium Sulfate 12 meq/Calcium Gluconate 15 meq/ Multivitamins 10 ml/Chromium/

Copper/Manganese/ Seleni/Zn 0.5 ml/ Insulin Human Regular 25 unit/ Total 

Parenteral Nutrition/Amino Acids/Dextrose/ Fat Emulsion Intravenous 1,400 ml @  

58.333 mls/ hr TPN  CONT IV  Last administered on 4/6/20at 22:24;  Start 4/6/20 

at 22:00;  Stop 4/7/20 at 21:59;  Status DC


Propofol 20 ml @ As Directed STK-MED ONCE IV ;  Start 4/6/20 at 11:07;  Stop 

4/6/20 at 11:07;  Status DC


Cellulose (Surgicel Hemostat 4x8) 1 each STK-MED ONCE .ROUTE  Last administered 

on 4/6/20at 11:44;  Start 4/6/20 at 11:55;  Stop 4/6/20 at 11:56;  Status DC


Sevoflurane (Ultane) 60 ml STK-MED ONCE IH ;  Start 4/6/20 at 12:46;  Stop 

4/6/20 at 12:46;  Status DC


Sodium Chloride 1,000 ml @  1,000 mls/hr Q1H PRN IV hypotension;  Start 4/6/20 

at 13:51;  Stop 4/6/20 at 19:50;  Status DC


Albumin Human 200 ml @  200 mls/hr 1X PRN  PRN IV Hypotension Last administered 

on 4/6/20at 14:51;  Start 4/6/20 at 14:00;  Stop 4/6/20 at 19:59;  Status DC


Diphenhydramine HCl (Benadryl) 25 mg 1X PRN  PRN IV ITCHING;  Start 4/6/20 at 

14:00;  Stop 4/7/20 at 13:59;  Status DC


Diphenhydramine HCl (Benadryl) 25 mg 1X PRN  PRN IV ITCHING;  Start 4/6/20 at 

14:00;  Stop 4/7/20 at 13:59;  Status DC


Sodium Chloride 1,000 ml @  400 mls/hr Q2H30M PRN IV PATENCY;  Start 4/6/20 at 

13:51;  Stop 4/7/20 at 01:50;  Status DC


Info (PHARMACY MONITORING -- do not chart) 1 each PRN DAILY  PRN MC SEE 

COMMENTS;  Start 4/6/20 at 14:00;  Stop 4/9/20 at 08:16;  Status DC


Heparin Sodium (Porcine) (Hep Lock Adult) 500 unit STK-MED ONCE IVP ;  Start 

4/7/20 at 09:29;  Stop 4/7/20 at 09:30;  Status DC


Sodium Chloride 1,000 ml @  1,000 mls/hr Q1H PRN IV hypotension;  Start 4/7/20 

at 10:43;  Stop 4/7/20 at 16:42;  Status DC


Sodium Chloride 1,000 ml @  400 mls/hr Q2H30M PRN IV PATENCY;  Start 4/7/20 at 

10:43;  Stop 4/7/20 at 22:42;  Status DC


Info (PHARMACY MONITORING -- do not chart) 1 each PRN DAILY  PRN MC SEE 

COMMENTS;  Start 4/7/20 at 10:45;  Status UNV


Info (PHARMACY MONITORING -- do not chart) 1 each PRN DAILY  PRN MC SEE 

COMMENTS;  Start 4/7/20 at 10:45;  Status UNV


Sodium Chloride 90 meq/Potassium Chloride 15 meq/ Magnesium Sulfate 12 

meq/Calcium Gluconate 15 meq/ Multivitamins 10 ml/Chromium/ Copper/Manganese/ 

Seleni/Zn 0.5 ml/ Insulin Human Regular 25 unit/ Total Parenteral Nutr

ition/Amino Acids/Dextrose/ Fat Emulsion Intravenous 1,400 ml @  58.333 mls/ hr 

TPN  CONT IV  Last administered on 4/7/20at 22:13;  Start 4/7/20 at 22:00;  Stop

4/8/20 at 21:59;  Status DC


Sodium Chloride 1,000 ml @  1,000 mls/hr Q1H PRN IV hypotension;  Start 4/8/20 

at 07:50;  Stop 4/8/20 at 13:49;  Status DC


Albumin Human 200 ml @  200 mls/hr 1X  ONCE IV ;  Start 4/8/20 at 08:00;  Stop 

4/8/20 at 08:53;  Status DC


Diphenhydramine HCl (Benadryl) 25 mg 1X PRN  PRN IV ITCHING;  Start 4/8/20 at 

08:00;  Stop 4/9/20 at 07:59;  Status DC


Diphenhydramine HCl (Benadryl) 25 mg 1X PRN  PRN IV ITCHING;  Start 4/8/20 at 

08:00;  Stop 4/9/20 at 07:59;  Status DC


Info (PHARMACY MONITORING -- do not chart) 1 each PRN DAILY  PRN MC SEE 

COMMENTS;  Start 4/8/20 at 08:00;  Stop 4/9/20 at 08:16;  Status DC


Albumin Human 50 ml @ 50 mls/hr 1X  ONCE IV ;  Start 4/8/20 at 08:53;  Stop 

4/8/20 at 08:56;  Status DC


Albumin Human 200 ml @  50 mls/hr PRN 1X  PRN IV HYPOTENSION Last administered 

on 4/14/20at 11:54;  Start 4/8/20 at 09:00;  Stop 5/21/20 at 11:14;  Status DC


Meropenem 500 mg/ Sodium Chloride 50 ml @  100 mls/hr Q12H IV  Last administered

on 4/28/20at 10:45;  Start 4/8/20 at 10:00;  Stop 4/28/20 at 12:37;  Status DC


Sodium Chloride 90 meq/Magnesium Sulfate 12 meq/ Calcium Gluconate 15 meq/ 

Multivitamins 10 ml/Chromium/ Copper/Manganese/ Seleni/Zn 0.5 ml/ Insulin Human 

Regular 25 unit/ Total Parenteral Nutrition/Amino Acids/Dextrose/ Fat Emulsion 

Intravenous 1,400 ml @  58.333 mls/ hr TPN  CONT IV  Last administered on 

4/8/20at 21:41;  Start 4/8/20 at 22:00;  Stop 4/9/20 at 21:59;  Status DC


Sodium Chloride 1,000 ml @  1,000 mls/hr Q1H PRN IV hypotension;  Start 4/9/20 

at 07:58;  Stop 4/9/20 at 13:57;  Status DC


Albumin Human 200 ml @  200 mls/hr 1X PRN  PRN IV Hypotension Last administered 

on 4/9/20at 09:30;  Start 4/9/20 at 08:00;  Stop 4/9/20 at 13:59;  Status DC


Sodium Chloride 1,000 ml @  400 mls/hr Q2H30M PRN IV PATENCY;  Start 4/9/20 at 

07:58;  Stop 4/9/20 at 19:57;  Status DC


Info (PHARMACY MONITORING -- do not chart) 1 each PRN DAILY  PRN MC SEE 

COMMENTS;  Start 4/9/20 at 08:00;  Status Cancel


Info (PHARMACY MONITORING -- do not chart) 1 each PRN DAILY  PRN MC SEE 

COMMENTS;  Start 4/9/20 at 08:15;  Status UNV


Sodium Chloride 90 meq/Potassium Phosphate 5 mmol/ Magnesium Sulfate 12 

meq/Calcium Gluconate 15 meq/ Multivitamins 10 ml/Chromium/ Copper/Manganese/ 

Seleni/Zn 0.5 ml/ Insulin Human Regular 30 unit/ Total Parenteral 

Nutrition/Amino Acids/Dextrose/ Fat Emulsion Intravenous 1,400 ml @  58.333 mls/

hr TPN  CONT IV  Last administered on 4/9/20at 22:08;  Start 4/9/20 at 22:00;  

Stop 4/10/20 at 21:59;  Status DC


Linezolid/Dextrose 300 ml @  300 mls/hr Q12HR IV  Last administered on 4/20/20at

20:40;  Start 4/10/20 at 11:00;  Stop 4/21/20 at 08:10;  Status DC


Sodium Chloride 90 meq/Potassium Phosphate 15 mmol/ Magnesium Sulfate 12 

meq/Calcium Gluconate 15 meq/ Multivitamins 10 ml/Chromium/ Copper/Manganese/ 

Seleni/Zn 0.5 ml/ Insulin Human Regular 30 unit/ Total Parenteral 

Nutrition/Amino Acids/Dextrose/ Fat Emulsion Intravenous 1,400 ml @  58.333 mls/

hr TPN  CONT IV  Last administered on 4/10/20at 21:49;  Start 4/10/20 at 22:00; 

Stop 4/11/20 at 21:59;  Status DC


Sodium Chloride 90 meq/Potassium Phosphate 15 mmol/ Magnesium Sulfate 12 

meq/Calcium Gluconate 15 meq/ Multivitamins 10 ml/Chromium/ Copper/Manganese/ 

Seleni/Zn 0.5 ml/ Insulin Human Regular 40 unit/ Total Parenteral 

Nutrition/Amino Acids/Dextrose/ Fat Emulsion Intravenous 1,400 ml @  58.333 mls/

hr TPN  CONT IV  Last administered on 4/11/20at 21:21;  Start 4/11/20 at 22:00; 

Stop 4/12/20 at 21:59;  Status DC


Sodium Chloride 1,000 ml @  1,000 mls/hr Q1H PRN IV hypotension;  Start 4/11/20 

at 13:26;  Stop 4/11/20 at 19:25;  Status DC


Albumin Human 200 ml @  200 mls/hr 1X PRN  PRN IV Hypotension Last administered 

on 4/11/20at 15:00;  Start 4/11/20 at 13:30;  Stop 4/11/20 at 19:29;  Status DC


Sodium Chloride (Normal Saline Flush) 10 ml 1X PRN  PRN IV AP catheter pack;  

Start 4/11/20 at 13:30;  Stop 4/12/20 at 13:29;  Status DC


Sodium Chloride (Normal Saline Flush) 10 ml 1X PRN  PRN IV  catheter pack;  

Start 4/11/20 at 13:30;  Stop 4/12/20 at 13:29;  Status DC


Sodium Chloride 1,000 ml @  400 mls/hr Q2H30M PRN IV PATENCY;  Start 4/11/20 at 

13:26;  Stop 4/12/20 at 01:25;  Status DC


Info (PHARMACY MONITORING -- do not chart) 1 each PRN DAILY  PRN MC SEE 

COMMENTS;  Start 4/11/20 at 13:30;  Stop 4/11/20 at 13:33;  Status DC


Info (PHARMACY MONITORING -- do not chart) 1 each PRN DAILY  PRN MC SEE 

COMMENTS;  Start 4/11/20 at 13:30;  Stop 4/11/20 at 13:34;  Status DC


Sodium Chloride 90 meq/Potassium Phosphate 19 mmol/ Magnesium Sulfate 12 

meq/Calcium Gluconate 15 meq/ Multivitamins 10 ml/Chromium/ Copper/Manganese/ 

Seleni/Zn 0.5 ml/ Insulin Human Regular 40 unit/ Total Parenteral 

Nutrition/Amino Acids/Dextrose/ Fat Emulsion Intravenous 1,400 ml @  58.333 mls/

hr TPN  CONT IV  Last administered on 4/12/20at 21:54;  Start 4/12/20 at 22:00; 

Stop 4/13/20 at 21:59;  Status DC


Sodium Chloride 1,000 ml @  1,000 mls/hr Q1H PRN IV hypotension;  Start 4/13/20 

at 09:35;  Stop 4/13/20 at 15:34;  Status DC


Albumin Human 200 ml @  200 mls/hr 1X PRN  PRN IV Hypotension;  Start 4/13/20 at

09:45;  Stop 4/13/20 at 15:44;  Status DC


Diphenhydramine HCl (Benadryl) 25 mg 1X PRN  PRN IV ITCHING;  Start 4/13/20 at 

09:45;  Stop 4/14/20 at 09:44;  Status DC


Diphenhydramine HCl (Benadryl) 25 mg 1X PRN  PRN IV ITCHING;  Start 4/13/20 at 

09:45;  Stop 4/14/20 at 09:44;  Status DC


Sodium Chloride 1,000 ml @  400 mls/hr Q2H30M PRN IV PATENCY;  Start 4/13/20 at 

09:35;  Stop 4/13/20 at 21:34;  Status DC


Info (PHARMACY MONITORING -- do not chart) 1 each PRN DAILY  PRN MC SEE 

COMMENTS;  Start 4/13/20 at 09:45;  Status Cancel


Sodium Chloride 100 meq/Potassium Phosphate 19 mmol/ Magnesium Sulfate 12 

meq/Calcium Gluconate 15 meq/ Multivitamins 10 ml/Chromium/ Copper/Manganese/ 

Seleni/Zn 0.5 ml/ Insulin Human Regular 40 unit/ Potassium Chloride 20 meq/ 

Total Parenteral Nutrition/Amino Acids/Dextrose/ Fat Emulsion Intravenous 1,400 

ml @  58.333 mls/ hr TPN  CONT IV  Last administered on 4/13/20at 22:02;  Start 

4/13/20 at 22:00;  Stop 4/14/20 at 21:59;  Status DC


Furosemide (Lasix) 40 mg 1X  ONCE IVP  Last administered on 4/13/20at 14:39;  

Start 4/13/20 at 14:30;  Stop 4/13/20 at 14:31;  Status DC


Metronidazole 100 ml @  100 mls/hr Q8HRS IV  Last administered on 4/21/20at 

06:04;  Start 4/14/20 at 10:00;  Stop 4/21/20 at 08:10;  Status DC


Sodium Chloride 1,000 ml @  1,000 mls/hr Q1H PRN IV hypotension;  Start 4/14/20 

at 08:00;  Stop 4/14/20 at 13:59;  Status DC


Albumin Human 200 ml @  200 mls/hr 1X PRN  PRN IV Hypotension;  Start 4/14/20 at

08:00;  Stop 4/14/20 at 13:59;  Status DC


Sodium Chloride 1,000 ml @  400 mls/hr Q2H30M PRN IV PATENCY;  Start 4/14/20 at 

08:00;  Stop 4/14/20 at 19:59;  Status DC


Info (PHARMACY MONITORING -- do not chart) 1 each PRN DAILY  PRN MC SEE 

COMMENTS;  Start 4/14/20 at 11:30;  Status UNV


Info (PHARMACY MONITORING -- do not chart) 1 each PRN DAILY  PRN MC SEE 

COMMENTS;  Start 4/14/20 at 11:30;  Stop 4/16/20 at 12:13;  Status DC


Sodium Chloride 100 meq/Potassium Phosphate 19 mmol/ Magnesium Sulfate 12 meq/Ca

lcium Gluconate 15 meq/ Multivitamins 10 ml/Chromium/ Copper/Manganese/ 

Seleni/Zn 0.5 ml/ Insulin Human Regular 40 unit/ Potassium Chloride 20 meq/ 

Total Parenteral Nutrition/Amino Acids/Dextrose/ Fat Emulsion Intravenous 1,400 

ml @  58.333 mls/ hr TPN  CONT IV  Last administered on 4/14/20at 21:52;  Start 

4/14/20 at 22:00;  Stop 4/15/20 at 21:59;  Status DC


Sodium Chloride (Normal Saline Flush) 10 ml QSHIFT  PRN IV AFTER MEDS AND BLOOD 

DRAWS;  Start 4/14/20 at 15:00;  Stop 5/12/20 at 11:27;  Status DC


Sodium Chloride (Normal Saline Flush) 10 ml PRN Q5MIN  PRN IV AFTER MEDS AND 

BLOOD DRAWS;  Start 4/14/20 at 15:00


Sodium Chloride (Normal Saline Flush) 20 ml PRN Q5MIN  PRN IV AFTER MEDS AND 

BLOOD DRAWS;  Start 4/14/20 at 15:00


Sodium Chloride 100 meq/Potassium Phosphate 19 mmol/ Magnesium Sulfate 12 

meq/Calcium Gluconate 15 meq/ Multivitamins 10 ml/Chromium/ Copper/Manganese/ 

Seleni/Zn 0.5 ml/ Insulin Human Regular 40 unit/ Potassium Chloride 20 meq/ 

Total Parenteral Nutrition/Amino Acids/Dextrose/ Fat Emulsion Intravenous 1,400 

ml @  58.333 mls/ hr TPN  CONT IV  Last administered on 4/15/20at 21:20;  Start 

4/15/20 at 22:00;  Stop 4/16/20 at 21:59;  Status DC


Lidocaine HCl (Buffered Lidocaine 1%) 3 ml STK-MED ONCE .ROUTE ;  Start 4/15/20 

at 13:16;  Stop 4/15/20 at 13:16;  Status DC


Lidocaine HCl (Buffered Lidocaine 1%) 6 ml 1X  ONCE INJ  Last administered on 

4/15/20at 13:45;  Start 4/15/20 at 13:30;  Stop 4/15/20 at 13:31;  Status DC


Albumin Human 100 ml @  100 mls/hr 1X  ONCE IV  Last administered on 4/15/20at 

15:41;  Start 4/15/20 at 15:00;  Stop 4/15/20 at 15:59;  Status DC


Albumin Human 50 ml @ 50 mls/hr 1X  ONCE IV  Last administered on 4/15/20at 

15:00;  Start 4/15/20 at 15:00;  Stop 4/15/20 at 15:59;  Status DC


Info (PHARMACY MONITORING -- do not chart) 1 each PRN DAILY  PRN MC SEE 

COMMENTS;  Start 4/16/20 at 11:30;  Status Cancel


Info (PHARMACY MONITORING -- do not chart) 1 each PRN DAILY  PRN MC SEE 

COMMENTS;  Start 4/16/20 at 11:30;  Status UNV


Sodium Chloride 100 meq/Potassium Phosphate 10 mmol/ Magnesium Sulfate 12 

meq/Calcium Gluconate 15 meq/ Multivitamins 10 ml/Chromium/ Copper/Manganese/ 

Seleni/Zn 0.5 ml/ Insulin Human Regular 35 unit/ Potassium Chloride 20 meq/ 

Total Parenteral Nutrition/Amino Acids/Dextrose/ Fat Emulsion Intravenous 1,400 

ml @  58.333 mls/ hr TPN  CONT IV  Last administered on 4/16/20at 22:10;  Start 

4/16/20 at 22:00;  Stop 4/17/20 at 21:59;  Status DC


Sodium Chloride 100 meq/Potassium Phosphate 5 mmol/ Magnesium Sulfate 12 

meq/Calcium Gluconate 15 meq/ Multivitamins 10 ml/Chromium/ Copper/Manganese/ 

Seleni/Zn 0.5 ml/ Insulin Human Regular 35 unit/ Potassium Chloride 20 meq/ 

Total Parenteral Nutrition/Amino Acids/Dextrose/ Fat Emulsion Intravenous 1,400 

ml @  58.333 mls/ hr TPN  CONT IV  Last administered on 4/17/20at 22:59;  Start 

4/17/20 at 22:00;  Stop 4/18/20 at 21:59;  Status DC


Sodium Chloride 1,000 ml @  1,000 mls/hr Q1H PRN IV hypotension;  Start 4/18/20 

at 08:27;  Stop 4/18/20 at 14:26;  Status DC


Albumin Human 200 ml @  200 mls/hr 1X PRN  PRN IV Hypotension Last administered 

on 4/18/20at 09:18;  Start 4/18/20 at 08:30;  Stop 4/18/20 at 14:29;  Status DC


Sodium Chloride 1,000 ml @  400 mls/hr Q2H30M PRN IV PATENCY;  Start 4/18/20 at 

08:27;  Stop 4/18/20 at 20:26;  Status DC


Info (PHARMACY MONITORING -- do not chart) 1 each PRN DAILY  PRN MC SEE 

COMMENTS;  Start 4/18/20 at 08:30;  Status Cancel


Info (PHARMACY MONITORING -- do not chart) 1 each PRN DAILY  PRN MC SEE 

COMMENTS;  Start 4/18/20 at 08:30;  Stop 4/26/20 at 13:10;  Status DC


Sodium Chloride 100 meq/Potassium Chloride 40 meq/ Magnesium Sulfate 15 

meq/Calcium Gluconate 15 meq/ Multivitamins 10 ml/Chromium/ Copper/Manganese/ 

Seleni/Zn 0.5 ml/ Insulin Human Regular 35 unit/ Total Parenteral 

Nutrition/Amino Acids/Dextrose/ Fat Emulsion Intravenous 1,400 ml @  58.333 mls/

hr TPN  CONT IV  Last administered on 4/18/20at 22:00;  Start 4/18/20 at 22:00; 

Stop 4/19/20 at 21:59;  Status DC


Potassium Chloride/Water 100 ml @  100 mls/hr 1X  ONCE IV  Last administered on 

4/18/20at 17:28;  Start 4/18/20 at 14:45;  Stop 4/18/20 at 15:44;  Status DC


Sodium Chloride 100 meq/Potassium Chloride 40 meq/ Magnesium Sulfate 15 

meq/Calcium Gluconate 15 meq/ Multivitamins 10 ml/Chromium/ Copper/Manganese/ 

Seleni/Zn 0.5 ml/ Insulin Human Regular 35 unit/ Total Parenteral 

Nutrition/Amino Acids/Dextrose/ Fat Emulsion Intravenous 1,400 ml @  58.333 mls/

hr TPN  CONT IV  Last administered on 4/19/20at 22:46;  Start 4/19/20 at 22:00; 

Stop 4/20/20 at 21:59;  Status DC


Sodium Chloride 100 meq/Potassium Chloride 40 meq/ Magnesium Sulfate 20 

meq/Calcium Gluconate 15 meq/ Multivitamins 10 ml/Chromium/ Copper/Manganese/ 

Seleni/Zn 0.5 ml/ Insulin Human Regular 35 unit/ Total Parenteral 

Nutrition/Amino Acids/Dextrose/ Fat Emulsion Intravenous 1,400 ml @  58.333 mls/

hr TPN  CONT IV  Last administered on 4/20/20at 22:31;  Start 4/20/20 at 22:00; 

Stop 4/21/20 at 21:59;  Status DC


Fentanyl Citrate (Fentanyl 2ml Vial) 50 mcg PRN Q2HR  PRN IVP PAIN Last 

administered on 4/27/20at 13:32;  Start 4/20/20 at 21:00;  Stop 4/28/20 at 

12:53;  Status DC


Fentanyl Citrate (Fentanyl 2ml Vial) 25 mcg PRN Q2HR  PRN IVP PAIN;  Start 

4/20/20 at 21:00;  Stop 4/28/20 at 12:54;  Status DC


Enoxaparin Sodium (Lovenox 100mg Syringe) 100 mg Q12HR SQ ;  Start 4/21/20 at 

21:00;  Status UNV


Amino Acids/ Glycerin/ Electrolytes 1,000 ml @  75 mls/hr C24Q00M IV ;  Start 

4/20/20 at 21:15;  Status UNV


Sodium Chloride 1,000 ml @  1,000 mls/hr Q1H PRN IV hypotension;  Start 4/21/20 

at 07:56;  Stop 4/21/20 at 13:55;  Status DC


Albumin Human 200 ml @  200 mls/hr 1X PRN  PRN IV Hypotension Last administered 

on 4/21/20at 08:40;  Start 4/21/20 at 08:00;  Stop 4/21/20 at 13:59;  Status DC


Sodium Chloride 1,000 ml @  400 mls/hr Q2H30M PRN IV PATENCY;  Start 4/21/20 at 

07:56;  Stop 4/21/20 at 19:55;  Status DC


Info (PHARMACY MONITORING -- do not chart) 1 each PRN DAILY  PRN MC SEE 

COMMENTS;  Start 4/21/20 at 08:00;  Status UNV


Info (PHARMACY MONITORING -- do not chart) 1 each PRN DAILY  PRN MC SEE 

COMMENTS;  Start 4/21/20 at 08:00;  Status UNV


Daptomycin 430 mg/ Sodium Chloride 50 ml @  100 mls/hr Q24H IV  Last 

administered on 4/21/20at 12:35;  Start 4/21/20 at 09:00;  Stop 4/21/20 at 

12:49;  Status DC


Sodium Chloride 100 meq/Potassium Chloride 40 meq/ Magnesium Sulfate 20 

meq/Calcium Gluconate 15 meq/ Multivitamins 10 ml/Chromium/ Copper/Manganese/ 

Seleni/Zn 0.5 ml/ Insulin Human Regular 35 unit/ Total Parenteral 

Nutrition/Amino Acids/Dextrose/ Fat Emulsion Intravenous 1,400 ml @  58.333 mls/

hr TPN  CONT IV  Last administered on 4/21/20at 21:26;  Start 4/21/20 at 22:00; 

Stop 4/22/20 at 21:59;  Status DC


Daptomycin 430 mg/ Sodium Chloride 50 ml @  100 mls/hr Q48H IV ;  Start 4/23/20 

at 09:00;  Stop 4/22/20 at 11:55;  Status DC


Sodium Chloride 100 meq/Potassium Chloride 40 meq/ Magnesium Sulfate 20 

meq/Calcium Gluconate 15 meq/ Multivitamins 10 ml/Chromium/ Copper/Manganese/ 

Seleni/Zn 0.5 ml/ Insulin Human Regular 35 unit/ Total Parenteral 

Nutrition/Amino Acids/Dextrose/ Fat Emulsion Intravenous 1,400 ml @  58.333 mls/

hr TPN  CONT IV  Last administered on 4/22/20at 22:27;  Start 4/22/20 at 22:00; 

Stop 4/23/20 at 21:59;  Status DC


Daptomycin 430 mg/ Sodium Chloride 50 ml @  100 mls/hr Q24H IV  Last 

administered on 4/24/20at 15:07;  Start 4/22/20 at 13:00;  Stop 4/25/20 at 

13:15;  Status DC


Sodium Chloride 100 meq/Potassium Chloride 40 meq/ Magnesium Sulfate 20 meq/

Calcium Gluconate 10 meq/ Multivitamins 10 ml/Chromium/ Copper/Manganese/ 

Seleni/Zn 0.5 ml/ Insulin Human Regular 35 unit/ Total Parenteral 

Nutrition/Amino Acids/Dextrose/ Fat Emulsion Intravenous 1,400 ml @  58.333 mls/

hr TPN  CONT IV  Last administered on 4/24/20at 00:06;  Start 4/23/20 at 22:00; 

Stop 4/24/20 at 21:59;  Status DC


Alteplase, Recombinant (Cathflo For Central Catheter Clearance) 1 mg 1X  ONCE 

INT CAT  Last administered on 4/24/20at 11:44;  Start 4/24/20 at 10:45;  Stop 

4/24/20 at 10:46;  Status DC


Ondansetron HCl (Zofran) 4 mg PRN Q6HRS  PRN IV NAUSEA/VOMITING;  Start 4/27/20 

at 07:00;  Stop 4/28/20 at 06:59;  Status DC


Fentanyl Citrate (Fentanyl 2ml Vial) 25 mcg PRN Q5MIN  PRN IV MILD PAIN 1-3;  

Start 4/27/20 at 07:00;  Stop 4/28/20 at 06:59;  Status DC


Fentanyl Citrate (Fentanyl 2ml Vial) 50 mcg PRN Q5MIN  PRN IV MODERATE TO SEVERE

PAIN Last administered on 4/27/20at 10:17;  Start 4/27/20 at 07:00;  Stop 

4/28/20 at 06:59;  Status DC


Ringer's Solution 1,000 ml @  30 mls/hr Q24H IV ;  Start 4/27/20 at 07:00;  Stop

4/27/20 at 18:59;  Status DC


Lidocaine HCl (Xylocaine-Mpf 1% 2ml Vial) 2 ml PRN 1X  PRN ID PRIOR TO IV START;

 Start 4/27/20 at 07:00;  Stop 4/28/20 at 06:59;  Status DC


Prochlorperazine Edisylate (Compazine) 5 mg PACU PRN  PRN IV NAUSEA, MRX1;  

Start 4/27/20 at 07:00;  Stop 4/28/20 at 06:59;  Status DC


Sodium Acetate 50 meq/Potassium Acetate 55 meq/ Magnesium Sulfate 20 meq/Calcium

Gluconate 10 meq/ Multivitamins 10 ml/Chromium/ Copper/Manganese/ Seleni/Zn 0.5 

ml/ Insulin Human Regular 35 unit/ Total Parenteral Nutrition/Amino 

Acids/Dextrose/ Fat Emulsion Intravenous 1,400 ml @  58.333 mls/ hr TPN  CONT IV

;  Start 4/24/20 at 22:00;  Stop 4/24/20 at 14:15;  Status DC


Sodium Acetate 50 meq/Potassium Acetate 55 meq/ Magnesium Sulfate 20 meq/Calcium

Gluconate 10 meq/ Multivitamins 10 ml/Chromium/ Copper/Manganese/ Seleni/Zn 0.5 

ml/ Insulin Human Regular 35 unit/ Total Parenteral Nutrition/Amino 

Acids/Dextrose/ Fat Emulsion Intravenous 1,800 ml @  75 mls/hr TPN  CONT IV  

Last administered on 4/24/20at 22:38;  Start 4/24/20 at 22:00;  Stop 4/25/20 at 

21:59;  Status DC


Sodium Chloride 1,000 ml @  1,000 mls/hr Q1H PRN IV hypotension;  Start 4/24/20 

at 15:31;  Stop 4/24/20 at 21:30;  Status DC


Diphenhydramine HCl (Benadryl) 25 mg 1X PRN  PRN IV ITCHING;  Start 4/24/20 at 

15:45;  Stop 4/25/20 at 15:44;  Status DC


Diphenhydramine HCl (Benadryl) 25 mg 1X PRN  PRN IV ITCHING;  Start 4/24/20 at 

15:45;  Stop 4/25/20 at 15:44;  Status DC


Sodium Chloride 1,000 ml @  400 mls/hr Q2H30M PRN IV PATENCY;  Start 4/24/20 at 

15:31;  Stop 4/25/20 at 03:30;  Status DC


Info (PHARMACY MONITORING -- do not chart) 1 each PRN DAILY  PRN MC SEE 

COMMENTS;  Start 4/24/20 at 15:45;  Stop 5/26/20 at 14:14;  Status DC


Sodium Acetate 50 meq/Potassium Acetate 55 meq/ Magnesium Sulfate 20 meq/Calcium

Gluconate 10 meq/ Multivitamins 10 ml/Chromium/ Copper/Manganese/ Seleni/Zn 0.5 

ml/ Insulin Human Regular 35 unit/ Total Parenteral Nutrition/Amino 

Acids/Dextrose/ Fat Emulsion Intravenous 1,800 ml @  75 mls/hr TPN  CONT IV  

Last administered on 4/25/20at 22:03;  Start 4/25/20 at 22:00;  Stop 4/26/20 at 

21:59;  Status DC


Daptomycin 430 mg/ Sodium Chloride 50 ml @  100 mls/hr Q24H IV  Last 

administered on 4/30/20at 13:00;  Start 4/25/20 at 13:00;  Stop 4/30/20 at 

20:58;  Status DC


Heparin Sodium (Porcine) 1000 unit/Sodium Chloride 1,001 ml @  1,001 mls/hr 1X  

ONCE IRR ;  Start 4/27/20 at 06:00;  Stop 4/27/20 at 06:59;  Status DC


Potassium Acetate 55 meq/Magnesium Sulfate 20 meq/ Calcium Gluconate 10 meq/ 

Multivitamins 10 ml/Chromium/ Copper/Manganese/ Seleni/Zn 0.5 ml/ Insulin Human 

Regular 35 unit/ Total Parenteral Nutrition/Amino Acids/Dextrose/ Fat Emulsion 

Intravenous 1,920 ml @  80 mls/hr TPN  CONT IV  Last administered on 4/26/20at 

22:10;  Start 4/26/20 at 22:00;  Stop 4/27/20 at 21:59;  Status DC


Dexamethasone Sodium Phosphate (Decadron) 4 mg STK-MED ONCE .ROUTE ;  Start 

4/27/20 at 10:56;  Stop 4/27/20 at 10:57;  Status DC


Ondansetron HCl (Zofran) 4 mg STK-MED ONCE .ROUTE ;  Start 4/27/20 at 10:56;  

Stop 4/27/20 at 10:57;  Status DC


Rocuronium Bromide (Zemuron) 50 mg STK-MED ONCE .ROUTE ;  Start 4/27/20 at 

10:56;  Stop 4/27/20 at 10:57;  Status DC


Fentanyl Citrate (Fentanyl 2ml Vial) 100 mcg STK-MED ONCE .ROUTE ;  Start 

4/27/20 at 10:56;  Stop 4/27/20 at 10:57;  Status DC


Bupivacaine HCl/ Epinephrine Bitart (Sensorcain-Epi 0.5%-1:[id number] Mpf) 30 ml 

STK-MED ONCE .ROUTE  Last administered on 4/27/20at 12:01;  Start 4/27/20 at 

10:58;  Stop 4/27/20 at 10:58;  Status DC


Cellulose (Surgicel Hemostat 2x14) 1 each STK-MED ONCE .ROUTE ;  Start 4/27/20 

at 10:58;  Stop 4/27/20 at 10:59;  Status DC


Iohexol (Omnipaque 300 Mg/ml) 50 ml STK-MED ONCE .ROUTE ;  Start 4/27/20 at 

10:58;  Stop 4/27/20 at 10:59;  Status DC


Cellulose (Surgicel Hemostat 4x8) 1 each STK-MED ONCE .ROUTE ;  Start 4/27/20 at

10:58;  Stop 4/27/20 at 10:59;  Status DC


Bisacodyl (Dulcolax Supp) 10 mg STK-MED ONCE .ROUTE ;  Start 4/27/20 at 10:59;  

Stop 4/27/20 at 10:59;  Status DC


Heparin Sodium (Porcine) 1000 unit/Sodium Chloride 1,001 ml @  1,001 mls/hr 1X  

ONCE IRR ;  Start 4/27/20 at 12:00;  Stop 4/27/20 at 12:59;  Status DC


Propofol 20 ml @ As Directed STK-MED ONCE IV ;  Start 4/27/20 at 11:05;  Stop 

4/27/20 at 11:05;  Status DC


Sevoflurane (Ultane) 90 ml STK-MED ONCE IH ;  Start 4/27/20 at 11:05;  Stop 

4/27/20 at 11:05;  Status DC


Sevoflurane (Ultane) 60 ml STK-MED ONCE IH ;  Start 4/27/20 at 12:26;  Stop 

4/27/20 at 12:27;  Status DC


Propofol 20 ml @ As Directed STK-MED ONCE IV ;  Start 4/27/20 at 12:26;  Stop 

4/27/20 at 12:27;  Status DC


Phenylephrine HCl (PHENYLEPHRINE in 0.9% NACL PF) 1 mg STK-MED ONCE IV ;  Start 

4/27/20 at 12:34;  Stop 4/27/20 at 12:34;  Status DC


Heparin Sodium (Porcine) (Heparin Sodium) 5,000 unit Q12HR SQ  Last administered

on 5/6/20at 20:57;  Start 4/27/20 at 21:00;  Stop 5/7/20 at 09:59;  Status DC


Sodium Chloride (Normal Saline Flush) 3 ml QSHIFT  PRN IV AFTER MEDS AND BLOOD 

DRAWS;  Start 4/27/20 at 13:45;  Status Cancel


Naloxone HCl (Narcan) 0.4 mg PRN Q2MIN  PRN IV SEE INSTRUCTIONS Last 

administered on 6/6/20at 15:15;  Start 4/27/20 at 13:45;  Stop 7/1/20 at 16:00; 

Status DC


Sodium Chloride 1,000 ml @  25 mls/hr Q24H IV  Last administered on 5/26/20at 

13:37;  Start 4/27/20 at 13:37;  Stop 5/29/20 at 13:09;  Status DC


Naloxone HCl (Narcan) 0.4 mg PRN Q2MIN  PRN IV SEE INSTRUCTIONS;  Start 4/27/20 

at 14:30;  Status UNV


Sodium Chloride 1,000 ml @  25 mls/hr Q24H IV ;  Start 4/27/20 at 14:30;  Status

UNV


Hydromorphone HCl 30 ml @ 0 mls/hr CONT PRN  PRN IV PER PROTOCOL Last 

administered on 5/2/20at 16:08;  Start 4/27/20 at 14:30;  Stop 5/4/20 at 08:55; 

Status DC


Potassium Acetate 55 meq/Magnesium Sulfate 20 meq/ Calcium Gluconate 10 meq/ 

Multivitamins 10 ml/Chromium/ Copper/Manganese/ Seleni/Zn 0.5 ml/ Insulin Human 

Regular 35 unit/ Total Parenteral Nutrition/Amino Acids/Dextrose/ Fat Emulsion 

Intravenous 1,920 ml @  80 mls/hr TPN  CONT IV  Last administered on 4/27/20at 

22:01;  Start 4/27/20 at 22:00;  Stop 4/28/20 at 21:59;  Status DC


Bumetanide (Bumex) 2 mg [id number] IV  Last administered on 5/1/20at 13:50;  Start 

4/28/20 at 14:00;  Stop 5/2/20 at 14:10;  Status DC


Meropenem 1 gm/ Sodium Chloride 100 ml @  200 mls/hr Q8HRS IV  Last administered

on 5/22/20at 05:53;  Start 4/28/20 at 14:00;  Stop 5/22/20 at 09:31;  Status DC


Potassium Acetate 55 meq/Magnesium Sulfate 20 meq/ Calcium Gluconate 10 meq/ 

Multivitamins 10 ml/Chromium/ Copper/Manganese/ Seleni/Zn 0.5 ml/ Insulin Human 

Regular 35 unit/ Total Parenteral Nutrition/Amino Acids/Dextrose/ Fat Emulsion 

Intravenous 1,920 ml @  80 mls/hr TPN  CONT IV  Last administered on 4/28/20at 

22:02;  Start 4/28/20 at 22:00;  Stop 4/29/20 at 21:59;  Status DC


Hydromorphone HCl (Dilaudid Standard PCA) 12 mg STK-MED ONCE IV ;  Start 4/27/20

at 14:35;  Stop 4/28/20 at 13:53;  Status DC


Artificial Tears (Artificial Tears) 1 drop PRN Q15MIN  PRN OU DRY EYE Last 

administered on 6/23/20at 21:17;  Start 4/29/20 at 05:30


Hydromorphone HCl (Dilaudid Standard PCA) 12 mg STK-MED ONCE IV ;  Start 4/28/20

at 12:05;  Stop 4/29/20 at 09:15;  Status DC


Potassium Acetate 65 meq/Magnesium Sulfate 20 meq/ Calcium Gluconate 10 meq/ 

Multivitamins 10 ml/Chromium/ Copper/Manganese/ Seleni/Zn 0.5 ml/ Insulin Human 

Regular 30 unit/ Total Parenteral Nutrition/Amino Acids/Dextrose/ Fat Emulsion 

Intravenous 1,920 ml @  80 mls/hr TPN  CONT IV  Last administered on 4/29/20at 

22:22;  Start 4/29/20 at 22:00;  Stop 4/30/20 at 21:59;  Status DC


Cyclobenzaprine HCl (Flexeril) 10 mg PRN Q6HRS  PRN PO MUSCLE SPASMS Last 

administered on 7/10/20at 19:12;  Start 4/30/20 at 10:45


Potassium Acetate 55 meq/Magnesium Sulfate 20 meq/ Calcium Gluconate 10 meq/ 

Multivitamins 10 ml/Chromium/ Copper/Manganese/ Seleni/Zn 0.5 ml/ Insulin Human 

Regular 30 unit/ Total Parenteral Nutrition/Amino Acids/Dextrose/ Fat Emulsion 

Intravenous 1,920 ml @  80 mls/hr TPN  CONT IV  Last administered on 5/1/20at 

01:00;  Start 4/30/20 at 22:00;  Stop 5/1/20 at 21:59;  Status DC


Magnesium Sulfate 50 ml @ 25 mls/hr 1X  ONCE IV  Last administered on 4/30/20at 

17:18;  Start 4/30/20 at 12:45;  Stop 4/30/20 at 14:44;  Status DC


Potassium Chloride/Water 100 ml @  100 mls/hr 1X  ONCE IV  Last administered on 

5/1/20at 11:27;  Start 5/1/20 at 12:00;  Stop 5/1/20 at 12:59;  Status DC


Hydromorphone HCl (Dilaudid Standard PCA) 12 mg STK-MED ONCE IV ;  Start 4/29/20

at 10:50;  Stop 5/1/20 at 11:02;  Status DC


Hydromorphone HCl (Dilaudid Standard PCA) 12 mg STK-MED ONCE IV ;  Start 4/30/20

at 13:47;  Stop 5/1/20 at 11:03;  Status DC


Potassium Acetate 30 meq/Magnesium Sulfate 20 meq/ Calcium Gluconate 10 meq/ 

Multivitamins 10 ml/Chromium/ Copper/Manganese/ Seleni/Zn 0.5 ml/ Insulin Human 

Regular 30 unit/ Potassium Chloride 30 meq/ Total Parenteral Nutrition/Amino 

Acids/Dextrose/ Fat Emulsion Intravenous 1,920 ml @  80 mls/hr TPN  CONT IV  

Last administered on 5/1/20at 22:34;  Start 5/1/20 at 22:00;  Stop 5/2/20 at 

21:59;  Status DC


Potassium Chloride/Water 100 ml @  100 mls/hr Q1H IV  Last administered on 

5/2/20at 13:05;  Start 5/2/20 at 07:00;  Stop 5/2/20 at 10:59;  Status DC


Magnesium Sulfate 50 ml @ 25 mls/hr 1X  ONCE IV  Last administered on 5/2/20at 

10:34;  Start 5/2/20 at 10:30;  Stop 5/2/20 at 12:29;  Status DC


Potassium Chloride 75 meq/ Magnesium Sulfate 20 meq/Calcium Gluconate 10 meq/ 

Multivitamins 10 ml/Chromium/ Copper/Manganese/ Seleni/Zn 0.5 ml/ Insulin Human 

Regular 30 unit/ Total Parenteral Nutrition/Amino Acids/Dextrose/ Fat Emulsion 

Intravenous 1,920 ml @  80 mls/hr TPN  CONT IV  Last administered on 5/2/20at 

21:51;  Start 5/2/20 at 22:00;  Stop 5/3/20 at 22:00;  Status DC


Potassium Chloride 75 meq/ Magnesium Sulfate 20 meq/Calcium Gluconate 10 meq/ 

Multivitamins 10 ml/Chromium/ Copper/Manganese/ Seleni/Zn 0.5 ml/ Insulin Human 

Regular 25 unit/ Total Parenteral Nutrition/Amino Acids/Dextrose/ Fat Emulsion 

Intravenous 1,920 ml @  80 mls/hr TPN  CONT IV  Last administered on 5/3/20at 

22:04;  Start 5/3/20 at 22:00;  Stop 5/4/20 at 21:59;  Status DC


Hydromorphone HCl (Dilaudid) 0.4 mg PRN Q4HRS  PRN IVP PAIN Last administered on

5/4/20at 10:57;  Start 5/4/20 at 09:00;  Stop 5/4/20 at 18:59;  Status DC


Micafungin Sodium 100 mg/Dextrose 100 ml @  100 mls/hr Q24H IV  Last 

administered on 5/26/20at 12:17;  Start 5/4/20 at 11:00;  Stop 5/27/20 at 09:59;

 Status DC


Daptomycin 485 mg/ Sodium Chloride 50 ml @  100 mls/hr Q24H IV  Last 

administered on 5/11/20at 13:10;  Start 5/4/20 at 11:00;  Stop 5/12/20 at 07:44;

 Status DC


Potassium Chloride 75 meq/ Magnesium Sulfate 15 meq/Calcium Gluconate 8 meq/ 

Multivitamins 10 ml/Chromium/ Copper/Manganese/ Seleni/Zn 0.5 ml/ Insulin Human 

Regular 25 unit/ Total Parenteral Nutrition/Amino Acids/Dextrose/ Fat Emulsion 

Intravenous 1,920 ml @  80 mls/hr TPN  CONT IV  Last administered on 5/4/20at 

23:08;  Start 5/4/20 at 22:00;  Stop 5/5/20 at 21:59;  Status DC


Haloperidol Lactate (Haldol Inj) 3 mg 1X  ONCE IVP  Last administered on 

5/4/20at 14:37;  Start 5/4/20 at 14:30;  Stop 5/4/20 at 14:31;  Status DC


Hydromorphone HCl (Dilaudid) 1 mg PRN Q4HRS  PRN IVP PAIN Last administered on 

5/18/20at 06:25;  Start 5/4/20 at 19:00;  Stop 5/18/20 at 17:10;  Status DC


Potassium Chloride 75 meq/ Magnesium Sulfate 15 meq/Calcium Gluconate 8 meq/ 

Multivitamins 10 ml/Chromium/ Copper/Manganese/ Seleni/Zn 0.5 ml/ Insulin Human 

Regular 20 unit/ Total Parenteral Nutrition/Amino Acids/Dextrose/ Fat Emulsion 

Intravenous 1,920 ml @  80 mls/hr TPN  CONT IV  Last administered on 5/5/20at 

22:10;  Start 5/5/20 at 22:00;  Stop 5/6/20 at 21:59;  Status DC


Lidocaine HCl (Buffered Lidocaine 1%) 3 ml STK-MED ONCE .ROUTE ;  Start 5/6/20 

at 11:31;  Stop 5/6/20 at 11:31;  Status DC


Lidocaine HCl (Buffered Lidocaine 1%) 3 ml STK-MED ONCE .ROUTE ;  Start 5/6/20 

at 12:28;  Stop 5/6/20 at 12:29;  Status DC


Lidocaine HCl (Buffered Lidocaine 1%) 6 ml 1X  ONCE INJ  Last administered on 

5/6/20at 12:53;  Start 5/6/20 at 12:45;  Stop 5/6/20 at 12:46;  Status DC


Potassium Chloride 75 meq/ Magnesium Sulfate 15 meq/Calcium Gluconate 8 meq/ 

Multivitamins 10 ml/Chromium/ Copper/Manganese/ Seleni/Zn 0.5 ml/ Insulin Human 

Regular 20 unit/ Total Parenteral Nutrition/Amino Acids/Dextrose/ Fat Emulsion 

Intravenous 1,920 ml @  80 mls/hr TPN  CONT IV  Last administered on 5/6/20at 

22:00;  Start 5/6/20 at 22:00;  Stop 5/7/20 at 21:59;  Status DC


Potassium Chloride 75 meq/ Magnesium Sulfate 15 meq/Calcium Gluconate 8 meq/ 

Multivitamins 10 ml/Chromium/ Copper/Manganese/ Seleni/Zn 0.5 ml/ Insulin Human 

Regular 15 unit/ Total Parenteral Nutrition/Amino Acids/Dextrose/ Fat Emulsion 

Intravenous 1,920 ml @  80 mls/hr TPN  CONT IV  Last administered on 5/7/20at 

22:28;  Start 5/7/20 at 22:00;  Stop 5/8/20 at 21:59;  Status DC


Vecuronium Bromide (Norcuron Bolus) 6 mg PRN Q6HRS  PRN IV VENT ASYNCHRONY;  

Start 5/7/20 at 19:15;  Stop 5/7/20 at 19:35;  Status DC


Bumetanide (Bumex) 2 mg 1X  ONCE IV  Last administered on 5/7/20at 22:09;  Start

5/7/20 at 19:45;  Stop 5/7/20 at 19:46;  Status DC


Lidocaine HCl (Buffered Lidocaine 1%) 3 ml STK-MED ONCE .ROUTE ;  Start 5/8/20 

at 07:59;  Stop 5/8/20 at 07:59;  Status DC


Midazolam HCl (Versed) 5 mg STK-MED ONCE .ROUTE ;  Start 5/8/20 at 08:36;  Stop 

5/8/20 at 08:36;  Status DC


Fentanyl Citrate (Fentanyl 5ml Vial) 250 mcg STK-MED ONCE .ROUTE ;  Start 5/8/20

at 08:36;  Stop 5/8/20 at 08:37;  Status DC


Lidocaine HCl (Buffered Lidocaine 1%) 3 ml 1X  ONCE IJ  Last administered on 

5/8/20at 09:30;  Start 5/8/20 at 09:15;  Stop 5/8/20 at 09:16;  Status DC


Midazolam HCl (Versed) 5 mg 1X  ONCE IV  Last administered on 5/8/20at 09:30;  

Start 5/8/20 at 09:15;  Stop 5/8/20 at 09:16;  Status DC


Fentanyl Citrate (Fentanyl 5ml Vial) 250 mcg 1X  ONCE IV  Last administered on 

5/8/20at 09:30;  Start 5/8/20 at 09:15;  Stop 5/8/20 at 09:16;  Status DC


Bumetanide (Bumex) 2 mg DAILY IV  Last administered on 5/18/20at 08:07;  Start 

5/8/20 at 10:00;  Stop 5/18/20 at 17:15;  Status DC


Potassium Chloride 75 meq/ Magnesium Sulfate 15 meq/ Multivitamins 10 

ml/Chromium/ Copper/Manganese/ Seleni/Zn 0.5 ml/ Insulin Human Regular 15 unit/ 

Total Parenteral Nutrition/Amino Acids/Dextrose/ Fat Emulsion Intravenous 1,920 

ml @  80 mls/hr TPN  CONT IV  Last administered on 5/8/20at 21:59;  Start 5/8/20

at 22:00;  Stop 5/9/20 at 21:59;  Status DC


Metoclopramide HCl (Reglan Vial) 10 mg PRN Q3HRS  PRN IVP NAUSEA/VOMITING-3rd 

choice Last administered on 5/14/20at 04:25;  Start 5/9/20 at 16:45


Potassium Chloride 75 meq/ Magnesium Sulfate 15 meq/ Multivitamins 10 

ml/Chromium/ Copper/Manganese/ Seleni/Zn 0.5 ml/ Insulin Human Regular 15 unit/ 

Total Parenteral Nutrition/Amino Acids/Dextrose/ Fat Emulsion Intravenous 1,920 

ml @  80 mls/hr TPN  CONT IV  Last administered on 5/9/20at 22:41;  Start 5/9/20

at 22:00;  Stop 5/10/20 at 21:59;  Status DC


Magnesium Sulfate 50 ml @ 25 mls/hr 1X  ONCE IV  Last administered on 5/10/20at 

10:44;  Start 5/10/20 at 09:00;  Stop 5/10/20 at 10:59;  Status DC


Potassium Chloride/Water 100 ml @  100 mls/hr 1X  ONCE IV  Last administered on 

5/10/20at 09:37;  Start 5/10/20 at 09:00;  Stop 5/10/20 at 09:59;  Status DC


Duloxetine HCl (Cymbalta) 30 mg DAILY PO  Last administered on 5/11/20at 09:48; 

Start 5/10/20 at 14:00;  Stop 5/13/20 at 10:25;  Status DC


Potassium Chloride 80 meq/ Magnesium Sulfate 20 meq/ Multivitamins 10 

ml/Chromium/ Copper/Manganese/ Seleni/Zn 0.5 ml/ Insulin Human Regular 15 unit/ 

Total Parenteral Nutrition/Amino Acids/Dextrose/ Fat Emulsion Intravenous 1,920 

ml @  80 mls/hr TPN  CONT IV  Last administered on 5/10/20at 21:42;  Start 

5/10/20 at 22:00;  Stop 5/11/20 at 21:59;  Status DC


Potassium Chloride 80 meq/ Magnesium Sulfate 20 meq/ Multivitamins 10 

ml/Chromium/ Copper/Manganese/ Seleni/Zn 0.5 ml/ Insulin Human Regular 15 unit/ 

Total Parenteral Nutrition/Amino Acids/Dextrose/ Fat Emulsion Intravenous 1,920 

ml @  80 mls/hr TPN  CONT IV  Last administered on 5/11/20at 22:20;  Start 

5/11/20 at 22:00;  Stop 5/12/20 at 21:59;  Status DC


Lidocaine HCl (Buffered Lidocaine 1%) 3 ml STK-MED ONCE .ROUTE ;  Start 5/12/20 

at 09:54;  Stop 5/12/20 at 09:55;  Status DC


Hydromorphone HCl (Dilaudid Standard PCA) 12 mg STK-MED ONCE IV ;  Start 5/1/20 

at 15:50;  Stop 5/12/20 at 11:24;  Status DC


Potassium Chloride 80 meq/ Magnesium Sulfate 20 meq/ Multivitamins 10 

ml/Chromium/ Copper/Manganese/ Seleni/Zn 0.5 ml/ Insulin Human Regular 15 unit/ 

Total Parenteral Nutrition/Amino Acids/Dextrose/ Fat Emulsion Intravenous 1,920 

ml @  80 mls/hr TPN  CONT IV  Last administered on 5/12/20at 21:40;  Start 

5/12/20 at 22:00;  Stop 5/13/20 at 21:59;  Status DC


Lidocaine HCl (Buffered Lidocaine 1%) 6 ml 1X  ONCE INJ  Last administered on 

5/12/20at 14:15;  Start 5/12/20 at 14:15;  Stop 5/12/20 at 14:16;  Status DC


Potassium Chloride 80 meq/ Magnesium Sulfate 20 meq/ Multivitamins 10 

ml/Chromium/ Copper/Manganese/ Seleni/Zn 1 ml/ Insulin Human Regular 15 unit/ 

Total Parenteral Nutrition/Amino Acids/Dextrose/ Fat Emulsion Intravenous 1,920 

ml @  80 mls/hr TPN  CONT IV  Last administered on 5/13/20at 22:04;  Start 

5/13/20 at 22:00;  Stop 5/14/20 at 21:59;  Status DC


Potassium Chloride/Water 100 ml @  100 mls/hr 1X  ONCE IV  Last administered on 

5/14/20at 11:34;  Start 5/14/20 at 11:00;  Stop 5/14/20 at 11:59;  Status DC


Potassium Chloride 90 meq/ Magnesium Sulfate 20 meq/ Multivitamins 10 

ml/Chromium/ Copper/Manganese/ Seleni/Zn 1 ml/ Insulin Human Regular 15 unit/ 

Total Parenteral Nutrition/Amino Acids/Dextrose/ Fat Emulsion Intravenous 1,920 

ml @  80 mls/hr TPN  CONT IV  Last administered on 5/14/20at 22:57;  Start 

5/14/20 at 22:00;  Stop 5/15/20 at 21:59;  Status DC


Potassium Chloride 90 meq/ Magnesium Sulfate 20 meq/ Multivitamins 10 

ml/Chromium/ Copper/Manganese/ Seleni/Zn 1 ml/ Insulin Human Regular 15 unit/ 

Total Parenteral Nutrition/Amino Acids/Dextrose/ Fat Emulsion Intravenous 1,920 

ml @  80 mls/hr TPN  CONT IV  Last administered on 5/15/20at 22:48;  Start 

5/15/20 at 22:00;  Stop 5/16/20 at 21:59;  Status DC


Potassium Chloride 90 meq/ Magnesium Sulfate 20 meq/ Multivitamins 10 

ml/Chromium/ Copper/Manganese/ Seleni/Zn 1 ml/ Insulin Human Regular 15 unit/ 

Total Parenteral Nutrition/Amino Acids/Dextrose/ Fat Emulsion Intravenous 1,890 

ml @  78.75 mls/ hr TPN  CONT IV  Last administered on 5/16/20at 22:15;  Start 

5/16/20 at 22:00;  Stop 5/17/20 at 21:59;  Status DC


Linezolid/Dextrose 300 ml @  300 mls/hr Q12HR IV  Last administered on 5/19/20at

21:08;  Start 5/17/20 at 09:00;  Stop 5/20/20 at 08:11;  Status DC


Daptomycin 450 mg/ Sodium Chloride 50 ml @  100 mls/hr Q24H IV  Last 

administered on 5/20/20at 09:25;  Start 5/17/20 at 09:00;  Stop 5/21/20 at 

08:30;  Status DC


Potassium Chloride 90 meq/ Magnesium Sulfate 20 meq/ Multivitamins 10 

ml/Chromium/ Copper/Manganese/ Seleni/Zn 1 ml/ Insulin Human Regular 15 unit/ 

Total Parenteral Nutrition/Amino Acids/Dextrose/ Fat Emulsion Intravenous 1,890 

ml @  78.75 mls/ hr TPN  CONT IV  Last administered on 5/17/20at 21:34;  Start 

5/17/20 at 22:00;  Stop 5/18/20 at 21:59;  Status DC


Lorazepam (Ativan Inj) 2 mg STK-MED ONCE .ROUTE ;  Start 5/17/20 at 14:58;  Stop

5/17/20 at 14:58;  Status DC


Metoprolol Tartrate (Lopressor Vial) 5 mg 1X  ONCE IVP  Last administered on 

5/17/20at 15:31;  Start 5/17/20 at 15:15;  Stop 5/17/20 at 15:16;  Status DC


Lorazepam (Ativan Inj) 2 mg 1X  ONCE IVP  Last administered on 5/17/20at 15:30; 

Start 5/17/20 at 15:15;  Stop 5/17/20 at 15:16;  Status DC


Enoxaparin Sodium (Lovenox 40mg Syringe) 40 mg Q24H SQ  Last administered on 

6/5/20at 17:44;  Start 5/17/20 at 17:00;  Stop 6/7/20 at 06:50;  Status DC


Lorazepam (Ativan Inj) 1 mg PRN Q4HRS  PRN IVP ANXIETY / AGITATION MILD-MOD Last

administered on 5/31/20at 15:55;  Start 5/17/20 at 19:15;  Stop 6/2/20 at 11:45;

 Status DC


Lorazepam (Ativan Inj) 2 mg PRN Q4HRS  PRN IVP ANXIETY / AGITATION SEVERE Last 

administered on 6/1/20at 07:55;  Start 5/17/20 at 19:15;  Stop 6/2/20 at 11:45; 

Status DC


Fentanyl Citrate (Fentanyl 2ml Vial) 50 mcg PRN Q4HRS  PRN IVP SEVERE PAIN Last 

administered on 6/13/20at 05:15;  Start 5/18/20 at 13:15;  Stop 6/14/20 at 

09:29;  Status DC


Fentanyl Citrate (Fentanyl 2ml Vial) 25 mcg PRN Q4HRS  PRN IVP MODERATE PAIN 

Last administered on 6/13/20at 00:27;  Start 5/18/20 at 13:15;  Stop 6/14/20 at 

09:30;  Status DC


Potassium Chloride 90 meq/ Magnesium Sulfate 20 meq/ Multivitamins 10 ml/

Chromium/ Copper/Manganese/ Seleni/Zn 1 ml/ Insulin Human Regular 15 unit/ Total

Parenteral Nutrition/Amino Acids/Dextrose/ Fat Emulsion Intravenous 1,890 ml @  

78.75 mls/ hr TPN  CONT IV  Last administered on 5/18/20at 22:18;  Start 5/18/20

at 22:00;  Stop 5/19/20 at 21:59;  Status DC


Furosemide (Lasix) 40 mg 1X  ONCE IVP  Last administered on 5/18/20at 21:51;  

Start 5/18/20 at 21:45;  Stop 5/18/20 at 21:48;  Status DC


Albumin Human 100 ml @  100 mls/hr 1X PRN  PRN IV SEE COMMENTS;  Start 5/19/20 

at 01:30


Furosemide (Lasix) 40 mg [id number] IVP  Last administered on 6/3/20at 08:04;  Start 

5/19/20 at 14:00;  Stop 6/3/20 at 13:07;  Status DC


Potassium Chloride 90 meq/ Magnesium Sulfate 20 meq/ Multivitamins 10 

ml/Chromium/ Copper/Manganese/ Seleni/Zn 1 ml/ Insulin Human Regular 15 unit/ 

Total Parenteral Nutrition/Amino Acids/Dextrose/ Fat Emulsion Intravenous 1,800 

ml @  75 mls/hr TPN  CONT IV  Last administered on 5/19/20at 22:31;  Start 

5/19/20 at 22:00;  Stop 5/20/20 at 21:59;  Status DC


Potassium Chloride 90 meq/ Magnesium Sulfate 20 meq/ Multivitamins 10 

ml/Chromium/ Copper/Manganese/ Seleni/Zn 1 ml/ Insulin Human Regular 15 unit/ 

Total Parenteral Nutrition/Amino Acids/Dextrose/ Fat Emulsion Intravenous 1,800 

ml @  75 mls/hr TPN  CONT IV  Last administered on 5/20/20at 22:28;  Start 

5/20/20 at 22:00;  Stop 5/21/20 at 21:59;  Status DC


Potassium Chloride 110 meq/ Magnesium Sulfate 20 meq/ Multivitamins 10 

ml/Chromium/ Copper/Manganese/ Seleni/Zn 1 ml/ Insulin Human Regular 15 unit/ 

Total Parenteral Nutrition/Amino Acids/Dextrose/ Fat Emulsion Intravenous 1,800 

ml @  75 mls/hr TPN  CONT IV  Last administered on 5/21/20at 22:01;  Start 5/21 /20 at 22:00;  Stop 5/22/20 at 21:59;  Status DC


Saliva Substitute (Biotene Moisturizing Mouth) 2 spray PRN Q15MIN  PRN PO DRY M

OUTH;  Start 5/21/20 at 11:00


Potassium Chloride 110 meq/ Magnesium Sulfate 20 meq/ Multivitamins 10 

ml/Chromium/ Copper/Manganese/ Seleni/Zn 1 ml/ Insulin Human Regular 15 unit/ 

Total Parenteral Nutrition/Amino Acids/Dextrose/ Fat Emulsion Intravenous 1,800 

ml @  75 mls/hr TPN  CONT IV  Last administered on 5/22/20at 22:21;  Start 

5/22/20 at 22:00;  Stop 5/23/20 at 21:59;  Status DC


Potassium Chloride 110 meq/ Magnesium Sulfate 20 meq/ Multivitamins 10 

ml/Chromium/ Copper/Manganese/ Seleni/Zn 1 ml/ Insulin Human Regular 15 unit/ 

Total Parenteral Nutrition/Amino Acids/Dextrose/ Fat Emulsion Intravenous 1,800 

ml @  75 mls/hr TPN  CONT IV  Last administered on 5/23/20at 22:04;  Start 

5/23/20 at 22:00;  Stop 5/24/20 at 21:59;  Status DC


Potassium Chloride 110 meq/ Magnesium Sulfate 20 meq/ Multivitamins 10 

ml/Chromium/ Copper/Manganese/ Seleni/Zn 1 ml/ Insulin Human Regular 15 unit/ 

Total Parenteral Nutrition/Amino Acids/Dextrose/ Fat Emulsion Intravenous 1,800 

ml @  75 mls/hr TPN  CONT IV  Last administered on 5/24/20at 22:48;  Start 

5/24/20 at 22:00;  Stop 5/25/20 at 21:59;  Status DC


Potassium Chloride 70 meq/ Magnesium Sulfate 20 meq/ Multivitamins 10 

ml/Chromium/ Copper/Manganese/ Seleni/Zn 1 ml/ Insulin Human Regular 15 unit/ 

Total Parenteral Nutrition/Amino Acids/Dextrose/ Fat Emulsion Intravenous 1,800 

ml @  75 mls/hr TPN  CONT IV  Last administered on 5/25/20at 21:39;  Start 5/25 /20 at 22:00;  Stop 5/26/20 at 21:59;  Status DC


Meropenem 500 mg/ Sodium Chloride 50 ml @  100 mls/hr Q6HRS IV  Last administer

ed on 5/27/20at 06:02;  Start 5/25/20 at 18:00;  Stop 5/27/20 at 09:59;  Status 

DC


Barium Sulfate (Varibar Thin Liquid Apple) 148 gm 1X  ONCE PO ;  Start 5/26/20 

at 11:45;  Stop 5/26/20 at 11:49;  Status DC


Potassium Chloride 70 meq/ Magnesium Sulfate 20 meq/ Multivitamins 10 

ml/Chromium/ Copper/Manganese/ Seleni/Zn 1 ml/ Insulin Human Regular 15 unit/ 

Total Parenteral Nutrition/Amino Acids/Dextrose/ Fat Emulsion Intravenous 1,800 

ml @  75 mls/hr TPN  CONT IV  Last administered on 5/26/20at 22:27;  Start 5/ 26/20 at 22:00;  Stop 5/27/20 at 21:59;  Status DC


Piperacillin Sod/ Tazobactam Sod 3.375 gm/Sodium Chloride 50 ml @  100 mls/hr 

Q6HRS IV  Last administered on 6/4/20at 06:10;  Start 5/27/20 at 12:00;  Stop 

6/4/20 at 07:26;  Status DC


Potassium Chloride 70 meq/ Magnesium Sulfate 20 meq/ Multivitamins 10 

ml/Chromium/ Copper/Manganese/ Seleni/Zn 1 ml/ Insulin Human Regular 15 unit/ T

otal Parenteral Nutrition/Amino Acids/Dextrose/ Fat Emulsion Intravenous 1,800 

ml @  75 mls/hr TPN  CONT IV  Last administered on 5/27/20at 22:03;  Start 

5/27/20 at 22:00;  Stop 5/28/20 at 21:59;  Status DC


Potassium Chloride 70 meq/ Magnesium Sulfate 20 meq/ Multivitamins 10 

ml/Chromium/ Copper/Manganese/ Seleni/Zn 1 ml/ Insulin Human Regular 15 unit/ 

Total Parenteral Nutrition/Amino Acids/Dextrose/ Fat Emulsion Intravenous 1,800 

ml @  75 mls/hr TPN  CONT IV  Last administered on 5/28/20at 22:33;  Start 5/ 28/20 at 22:00;  Stop 5/29/20 at 21:59;  Status DC


Potassium Chloride 70 meq/ Magnesium Sulfate 20 meq/ Multivitamins 10 ml/

mium/ Copper/Manganese/ Seleni/Zn 1 ml/ Insulin Human Regular 15 unit/ Total 

Parenteral Nutrition/Amino Acids/Dextrose/ Fat Emulsion Intravenous 1,800 ml @  

75 mls/hr TPN  CONT IV  Last administered on 5/29/20at 23:13;  Start 5/29/20 at 

22:00;  Stop 5/30/20 at 21:59;  Status DC


Potassium Chloride 80 meq/ Magnesium Sulfate 20 meq/ Multivitamins 10 

ml/Chromium/ Copper/Manganese/ Seleni/Zn 1 ml/ Insulin Human Regular 15 unit/ 

Total Parenteral Nutrition/Amino Acids/Dextrose/ Fat Emulsion Intravenous 1,800 

ml @  75 mls/hr TPN  CONT IV  Last administered on 5/30/20at 22:30;  Start 

5/30/20 at 22:00;  Stop 5/31/20 at 21:59;  Status DC


Potassium Chloride 80 meq/ Magnesium Sulfate 20 meq/ Multivitamins 10 

ml/Chromium/ Copper/Manganese/ Seleni/Zn 1 ml/ Insulin Human Regular 15 unit/ 

Total Parenteral Nutrition/Amino Acids/Dextrose/ Fat Emulsion Intravenous 1,800 

ml @  75 mls/hr TPN  CONT IV  Last administered on 5/31/20at 21:54;  Start 

5/31/20 at 22:00;  Stop 6/1/20 at 21:59;  Status DC


Potassium Chloride/Water 100 ml @  100 mls/hr 1X  ONCE IV  Last administered on 

6/1/20at 10:15;  Start 6/1/20 at 10:00;  Stop 6/1/20 at 10:59;  Status DC


Potassium Chloride 90 meq/ Magnesium Sulfate 20 meq/ Multivitamins 10 

ml/Chromium/ Copper/Manganese/ Seleni/Zn 1 ml/ Insulin Human Regular 20 unit/ 

Total Parenteral Nutrition/Amino Acids/Dextrose/ Fat Emulsion Intravenous 1,800 

ml @  75 mls/hr TPN  CONT IV  Last administered on 6/1/20at 22:28;  Start 6/1/20

at 22:00;  Stop 6/2/20 at 21:59;  Status DC


Potassium Chloride 90 meq/ Magnesium Sulfate 20 meq/ Multivitamins 10 

ml/Chromium/ Copper/Manganese/ Seleni/Zn 1 ml/ Insulin Human Regular 20 unit/ 

Total Parenteral Nutrition/Amino Acids/Dextrose/ Fat Emulsion Intravenous 1,800 

ml @  75 mls/hr TPN  CONT IV  Last administered on 6/2/20at 22:08;  Start 6/2/20

at 22:00;  Stop 6/3/20 at 21:59;  Status DC


Lorazepam (Ativan Inj) 0.25 mg PRN Q4HRS  PRN IVP ANXIETY / AGITATION Last 

administered on 7/12/20at 00:27;  Start 6/3/20 at 07:30


Potassium Chloride 90 meq/ Magnesium Sulfate 20 meq/ Multivitamins 10 

ml/Chromium/ Copper/Manganese/ Seleni/Zn 1 ml/ Insulin Human Regular 20 unit/ 

Total Parenteral Nutrition/Amino Acids/Dextrose/ Fat Emulsion Intravenous 1,800 

ml @  75 mls/hr TPN  CONT IV  Last administered on 6/3/20at 23:13;  Start 6/3/20

at 22:00;  Stop 6/4/20 at 21:59;  Status DC


Furosemide (Lasix) 40 mg DAILY IVP  Last administered on 6/5/20at 11:14;  Start 

6/3/20 at 13:30;  Stop 6/7/20 at 09:12;  Status DC


Fluoxetine HCl (PROzac) 20 mg QHS PEG  Last administered on 7/12/20at 21:23;  

Start 6/4/20 at 21:00


Fentanyl (Duragesic 50mcg/ Hr Patch) 1 patch Q72H TD  Last administered on 

6/4/20at 21:22;  Start 6/4/20 at 21:00;  Stop 6/13/20 at 12:00;  Status DC


Potassium Chloride 40 meq/ Potassium Acetate 60 meq/Magnesium Sulfate 10 meq/ 

Multivitamins 10 ml/Chromium/ Copper/Manganese/ Seleni/Zn 1 ml/ Insulin Human 

Regular 20 unit/ Total Parenteral Nutrition/Amino Acids/Dextrose/ Fat Emulsion 

Intravenous 1,800 ml @  75 mls/hr TPN  CONT IV  Last administered on 6/5/20at 

00:03;  Start 6/4/20 at 22:00;  Stop 6/5/20 at 21:59;  Status DC


Potassium Acetate 80 meq/Magnesium Sulfate 5 meq/ Multivitamins 10 ml/Chromium/ 

Copper/Manganese/ Seleni/Zn 1 ml/ Insulin Human Regular 20 unit/ Total 

Parenteral Nutrition/Amino Acids/Dextrose/ Fat Emulsion Intravenous 1,920 ml @  

80 mls/hr TPN  CONT IV  Last administered on 6/5/20at 21:59;  Start 6/5/20 at 

22:00;  Stop 6/6/20 at 21:59;  Status DC


Potassium Acetate 60 meq/Magnesium Sulfate 5 meq/ Multivitamins 10 ml/Chromium/ 

Copper/Manganese/ Seleni/Zn 1 ml/ Insulin Human Regular 30 unit/ Total 

Parenteral Nutrition/Amino Acids/Dextrose/ Fat Emulsion Intravenous 1,920 ml @  

80 mls/hr TPN  CONT IV  Last administered on 6/6/20at 21:54;  Start 6/6/20 at 

22:00;  Stop 6/7/20 at 21:59;  Status DC


Norepinephrine Bitartrate 8 mg/ Dextrose 258 ml @  13.332 mls/ hr CONT  PRN IV 

PER PROTOCOL Last administered on 7/2/20at 09:09;  Start 6/7/20 at 06:30


Albumin Human 500 ml @  125 mls/hr 1X  ONCE IV  Last administered on 6/7/20at 

08:10;  Start 6/7/20 at 08:15;  Stop 6/7/20 at 12:14;  Status DC


Potassium Acetate 40 meq/Magnesium Sulfate 5 meq/ Multivitamins 10 ml/Chromium/ 

Copper/Manganese/ Seleni/Zn 1 ml/ Insulin Human Regular 30 unit/ Total 

Parenteral Nutrition/Amino Acids/Dextrose/ Fat Emulsion Intravenous 1,920 ml @  

80 mls/hr TPN  CONT IV  Last administered on 6/7/20at 22:23;  Start 6/7/20 at 

22:00;  Stop 6/8/20 at 21:59;  Status DC


Meropenem 1 gm/ Sodium Chloride 100 ml @  200 mls/hr Q8HRS IV ;  Start 6/7/20 at

14:00;  Status Cancel


Meropenem 1 gm/ Sodium Chloride 100 ml @  200 mls/hr Q8HRS IV  Last administered

on 6/7/20at 11:04;  Start 6/7/20 at 10:00;  Stop 6/7/20 at 13:00;  Status DC


Meropenem 1 gm/ Sodium Chloride 100 ml @  200 mls/hr Q12HR IV  Last administered

on 6/25/20at 08:27;  Start 6/7/20 at 21:00;  Stop 6/25/20 at 08:56;  Status DC


Sodium Chloride 1,000 ml @  1,000 mls/hr 1X  ONCE IV  Last administered on 

6/7/20at 11:06;  Start 6/7/20 at 10:45;  Stop 6/7/20 at 11:44;  Status DC


Micafungin Sodium 100 mg/Dextrose 100 ml @  100 mls/hr Q24H IV  Last 

administered on 6/24/20at 12:34;  Start 6/7/20 at 11:00;  Stop 6/25/20 at 08:56;

 Status DC


Daptomycin 410 mg/ Sodium Chloride 50 ml @  100 mls/hr Q24H IV  Last administer

ed on 6/9/20at 13:33;  Start 6/7/20 at 14:00;  Stop 6/10/20 at 08:30;  Status DC


Midazolam HCl (Versed) 2 mg STK-MED ONCE .ROUTE ;  Start 6/7/20 at 14:47;  Stop 

6/7/20 at 14:48;  Status DC


Fentanyl Citrate (Fentanyl 2ml Vial) 100 mcg STK-MED ONCE .ROUTE ;  Start 6/7/20

at 14:47;  Stop 6/7/20 at 14:48;  Status DC


Flumazenil (Romazicon) 0.5 mg STK-MED ONCE IV ;  Start 6/7/20 at 14:48;  Stop 

6/7/20 at 14:48;  Status DC


Naloxone HCl (Narcan) 0.4 mg STK-MED ONCE .ROUTE ;  Start 6/7/20 at 14:48;  Stop

6/7/20 at 14:48;  Status DC


Lidocaine HCl (Lidocaine 1% 20ml Vial) 20 ml STK-MED ONCE .ROUTE ;  Start 6/7/20

at 14:48;  Stop 6/7/20 at 14:48;  Status DC


Midazolam HCl (Versed) 2 mg 1X  ONCE IV  Last administered on 6/7/20at 15:28;  

Start 6/7/20 at 15:00;  Stop 6/7/20 at 15:01;  Status DC


Fentanyl Citrate (Fentanyl 2ml Vial) 100 mcg 1X  ONCE IV  Last administered on 

6/7/20at 15:28;  Start 6/7/20 at 15:00;  Stop 6/7/20 at 15:01;  Status DC


Lidocaine HCl (Lidocaine 1% 20ml Vial) 20 ml 1X  ONCE INJ  Last administered on 

6/7/20at 15:30;  Start 6/7/20 at 15:00;  Stop 6/7/20 at 15:01;  Status DC


Sodium Chloride 1,000 ml @  100 mls/hr Q10H IV  Last administered on 6/16/20at 

07:30;  Start 6/7/20 at 20:00;  Stop 6/16/20 at 11:26;  Status DC


Sodium Bicarbonate (Sodium Bicarb Adult 8.4% Syr) 50 meq 1X  ONCE IV  Last 

administered on 6/7/20at 21:47;  Start 6/7/20 at 22:00;  Stop 6/7/20 at 22:01;  

Status DC


Potassium Acetate 40 meq/Magnesium Sulfate 5 meq/ Multivitamins 10 ml/Chromium/ 

Copper/Manganese/ Seleni/Zn 1 ml/ Insulin Human Regular 30 unit/ Total 

Parenteral Nutrition/Amino Acids/Dextrose/ Fat Emulsion Intravenous 1,920 ml @  

80 mls/hr TPN  CONT IV  Last administered on 6/8/20at 22:28;  Start 6/8/20 at 

22:00;  Stop 6/9/20 at 21:59;  Status DC


Sodium Chloride 500 ml @  500 mls/hr 1X  ONCE IV  Last administered on 6/9/20at 

06:39;  Start 6/9/20 at 06:45;  Stop 6/9/20 at 07:44;  Status DC


Potassium Acetate 40 meq/Magnesium Sulfate 5 meq/ Multivitamins 10 ml/Chromium/ 

Copper/Manganese/ Seleni/Zn 1 ml/ Insulin Human Regular 30 unit/ Total 

Parenteral Nutrition/Amino Acids/Dextrose/ Fat Emulsion Intravenous 1,920 ml @  

80 mls/hr TPN  CONT IV  Last administered on 6/9/20at 22:03;  Start 6/9/20 at 

22:00;  Stop 6/10/20 at 21:59;  Status DC


Metoprolol Tartrate (Lopressor Vial) 5 mg PRN Q6HRS  PRN IVP HYPERTENSION Last 

administered on 7/12/20at 18:01;  Start 6/10/20 at 09:00


Potassium Acetate 40 meq/Magnesium Sulfate 5 meq/ Multivitamins 10 ml/Chromium/ 

Copper/Manganese/ Seleni/Zn 1 ml/ Insulin Human Regular 30 unit/ Total 

Parenteral Nutrition/Amino Acids/Dextrose/ Fat Emulsion Intravenous 1,920 ml @  

80 mls/hr TPN  CONT IV  Last administered on 6/10/20at 21:26;  Start 6/10/20 at 

22:00;  Stop 6/11/20 at 21:59;  Status DC


Potassium Acetate 40 meq/Magnesium Sulfate 5 meq/ Multivitamins 10 ml/Chromium/ 

Copper/Manganese/ Seleni/Zn 1 ml/ Insulin Human Regular 30 unit/ Total 

Parenteral Nutrition/Amino Acids/Dextrose/ Fat Emulsion Intravenous 1,920 ml @  

80 mls/hr TPN  CONT IV  Last administered on 6/11/20at 23:23;  Start 6/11/20 at 

22:00;  Stop 6/12/20 at 21:59;  Status DC


Potassium Acetate 40 meq/Magnesium Sulfate 5 meq/ Multivitamins 10 ml/Chromium/ 

Copper/Manganese/ Seleni/Zn 1 ml/ Insulin Human Regular 30 unit/ Total 

Parenteral Nutrition/Amino Acids/Dextrose/ Fat Emulsion Intravenous 1,920 ml @  

80 mls/hr TPN  CONT IV  Last administered on 6/12/20at 21:35;  Start 6/12/20 at 

22:00;  Stop 6/13/20 at 21:59;  Status DC


Furosemide (Lasix) 20 mg 1X  ONCE IVP  Last administered on 6/13/20at 06:26;  

Start 6/13/20 at 06:15;  Stop 6/13/20 at 06:16;  Status DC


Methylprednisolone Sodium Succinate (SOLU-Medrol 125MG VIAL) 125 mg 1X  ONCE IV 

Last administered on 6/13/20at 06:26;  Start 6/13/20 at 06:15;  Stop 6/13/20 at 

06:16;  Status DC


Albuterol/ Ipratropium (Duoneb) 3 ml Q4HRS NEB  Last administered on 7/13/20at 

08:27;  Start 6/13/20 at 08:00


Fentanyl Citrate 30 ml @ 0 mls/hr CONT  PRN IV SEE PROTOCOL Last administered on

7/4/20at 08:03;  Start 6/13/20 at 06:00;  Stop 7/4/20 at 12:42;  Status DC


Propofol 100 ml @ 0 mls/hr CONT  PRN IV SEE PROTOCOL Last administered on 

6/20/20at 23:50;  Start 6/13/20 at 06:00


Fentanyl Citrate (Fentanyl 2ml Vial) 25 mcg PRN Q1HR  PRN IV SEE COMMENTS Last 

administered on 7/13/20at 05:39;  Start 6/13/20 at 06:00


Fentanyl Citrate (Fentanyl 2ml Vial) 50 mcg PRN Q1HR  PRN IV SEE COMMENTS Last 

administered on 7/12/20at 18:02;  Start 6/13/20 at 06:00


Chlorhexidine Gluconate (Peridex) 15 ml BID MM ;  Start 6/13/20 at 09:00;  Stop 

6/13/20 at 07:58;  Status DC


Potassium Acetate 40 meq/Magnesium Sulfate 5 meq/ Multivitamins 10 ml/Chromium/ 

Copper/Manganese/ Seleni/Zn 1 ml/ Insulin Human Regular 30 unit/ Total 

Parenteral Nutrition/Amino Acids/Dextrose/ Fat Emulsion Intravenous 1,920 ml @  

80 mls/hr TPN  CONT IV  Last administered on 6/13/20at 21:19;  Start 6/13/20 at 

22:00;  Stop 6/14/20 at 21:59;  Status DC


Acetylcysteine (Mucomyst 20% Resp Treatment) 600 mg BID NEB  Last administered 

on 6/19/20at 09:33;  Start 6/13/20 at 21:00;  Stop 6/19/20 at 10:39;  Status DC


Magnesium Sulfate 100 ml @  25 mls/hr 1X  ONCE IV  Last administered on 

6/13/20at 15:48;  Start 6/13/20 at 15:45;  Stop 6/13/20 at 19:44;  Status DC


Potassium Acetate 40 meq/Magnesium Sulfate 5 meq/ Multivitamins 10 ml/Chromium/ 

Copper/Manganese/ Seleni/Zn 1 ml/ Insulin Human Regular 30 unit/ Total 

Parenteral Nutrition/Amino Acids/Dextrose/ Fat Emulsion Intravenous 1,920 ml @  

80 mls/hr TPN  CONT IV  Last administered on 6/14/20at 21:35;  Start 6/14/20 at 

22:00;  Stop 6/15/20 at 21:59;  Status DC


Potassium Chloride/Water 100 ml @  100 mls/hr Q1H IV  Last administered on 

6/15/20at 08:31;  Start 6/15/20 at 07:00;  Stop 6/15/20 at 08:59;  Status DC


Potassium Acetate 40 meq/Magnesium Sulfate 5 meq/ Multivitamins 10 ml/Chromium/ 

Copper/Manganese/ Seleni/Zn 1 ml/ Insulin Human Regular 30 unit/ Total 

Parenteral Nutrition/Amino Acids/Dextrose/ Fat Emulsion Intravenous 1,920 ml @  

80 mls/hr TPN  CONT IV  Last administered on 6/15/20at 21:54;  Start 6/15/20 at 

22:00;  Stop 6/16/20 at 19:34;  Status DC


Lidocaine HCl (Buffered Lidocaine 1%) 3 ml STK-MED ONCE .ROUTE ;  Start 6/15/20 

at 12:14;  Stop 6/15/20 at 12:14;  Status DC


Lidocaine HCl (Buffered Lidocaine 1%) 3 ml 1X  ONCE IJ  Last administered on 

6/15/20at 13:11;  Start 6/15/20 at 13:00;  Stop 6/15/20 at 13:01;  Status DC


Magnesium Sulfate 50 ml @ 25 mls/hr 1X  ONCE IV ;  Start 6/16/20 at 08:15;  Stop

6/16/20 at 10:14;  Status DC


Potassium Acetate 40 meq/Magnesium Sulfate 10 meq/ Multivitamins 10 ml/Chromium/

Copper/Manganese/ Seleni/Zn 1 ml/ Insulin Human Regular 20 unit/ Total 

Parenteral Nutrition/Amino Acids/Dextrose/ Fat Emulsion Intravenous 1,920 ml @  

80 mls/hr TPN  CONT IV  Last administered on 6/16/20at 21:32;  Start 6/16/20 at 

22:00;  Stop 6/17/20 at 21:59;  Status DC


Potassium Chloride/Water 100 ml @  100 mls/hr Q1H IV  Last administered on 

6/17/20at 09:12;  Start 6/17/20 at 08:00;  Stop 6/17/20 at 09:59;  Status DC


Alteplase, Recombinant (Cathflo For Central Catheter Clearance) 4 mg 1X  ONCE 

INT CAT ;  Start 6/17/20 at 09:15;  Stop 6/17/20 at 09:16;  Status UNV


Alteplase, Recombinant (Cathflo For Central Catheter Clearance) 4 mg 1X  ONCE I

NT CAT ;  Start 6/17/20 at 09:15;  Stop 6/17/20 at 09:16;  Status UNV


Alteplase, Recombinant (Cathflo For Central Catheter Clearance) 4 mg 1X  ONCE 

INT CAT ;  Start 6/17/20 at 09:15;  Stop 6/17/20 at 09:16;  Status UNV


Alteplase, Recombinant 4 mg/ Sodium Chloride 20 ml @ 20 mls/hr 1X  ONCE IV  Last

administered on 6/17/20at 10:10;  Start 6/17/20 at 10:00;  Stop 6/17/20 at 

10:59;  Status DC


Alteplase, Recombinant 4 mg/ Sodium Chloride 20 ml @ 20 mls/hr 1X  ONCE IV  Last

administered on 6/17/20at 10:09;  Start 6/17/20 at 10:00;  Stop 6/17/20 at 

10:59;  Status DC


Alteplase, Recombinant 4 mg/ Sodium Chloride 20 ml @ 20 mls/hr 1X  ONCE IV  Last

administered on 6/17/20at 10:09;  Start 6/17/20 at 10:00;  Stop 6/17/20 at 

10:59;  Status DC


Potassium Acetate 60 meq/Magnesium Sulfate 10 meq/ Multivitamins 10 ml/Chromium/

Copper/Manganese/ Seleni/Zn 1 ml/ Insulin Human Regular 20 unit/ Total Par

enteral Nutrition/Amino Acids/Dextrose/ Fat Emulsion Intravenous 1,920 ml @  80 

mls/hr TPN  CONT IV  Last administered on 6/17/20at 21:55;  Start 6/17/20 at 

22:00;  Stop 6/18/20 at 21:59;  Status DC


Albumin Human 500 ml @  125 mls/hr 1X  ONCE IV  Last administered on 6/18/20at 

12:01;  Start 6/18/20 at 11:15;  Stop 6/18/20 at 15:14;  Status DC


Sodium Chloride 500 ml @  500 mls/hr 1X  ONCE IV  Last administered on 6/18/20at

13:50;  Start 6/18/20 at 11:15;  Stop 6/18/20 at 12:14;  Status DC


Potassium Acetate 60 meq/Magnesium Sulfate 14 meq/ Multivitamins 10 ml/Chromium/

Copper/Manganese/ Seleni/Zn 1 ml/ Insulin Human Regular 20 unit/ Total 

Parenteral Nutrition/Amino Acids/Dextrose/ Fat Emulsion Intravenous 1,920 ml @  

80 mls/hr TPN  CONT IV  Last administered on 6/18/20at 22:26;  Start 6/18/20 at 

22:00;  Stop 6/19/20 at 21:59;  Status DC


Ciprofloxacin/ Dextrose 200 ml @  200 mls/hr Q12HR IV  Last administered on 

6/25/20at 08:27;  Start 6/18/20 at 21:00;  Stop 6/25/20 at 08:56;  Status DC


Albumin Human 250 ml @  62.5 mls/hr 1X  ONCE IV  Last administered on 6/19/20at 

11:09;  Start 6/19/20 at 11:00;  Stop 6/19/20 at 14:59;  Status DC


Furosemide (Lasix) 20 mg 1X  ONCE IVP  Last administered on 6/19/20at 14:52;  

Start 6/19/20 at 10:45;  Stop 6/19/20 at 10:49;  Status DC


Potassium Acetate 60 meq/Magnesium Sulfate 14 meq/ Multivitamins 10 ml/Chromium/

Copper/Manganese/ Seleni/Zn 1 ml/ Insulin Human Regular 15 unit/ Total 

Parenteral Nutrition/Amino Acids/Dextrose/ Fat Emulsion Intravenous 1,920 ml @  

80 mls/hr TPN  CONT IV  Last administered on 6/19/20at 22:08;  Start 6/19/20 at 

22:00;  Stop 6/20/20 at 21:59;  Status DC


Potassium Acetate 60 meq/Magnesium Sulfate 14 meq/ Multivitamins 10 ml/Chromium/

Copper/Manganese/ Seleni/Zn 1 ml/ Insulin Human Regular 15 unit/ Total Pa

renteral Nutrition/Amino Acids/Dextrose/ Fat Emulsion Intravenous 1,920 ml @  80

mls/hr TPN  CONT IV  Last administered on 6/20/20at 22:12;  Start 6/20/20 at 

22:00;  Stop 6/21/20 at 21:59;  Status DC


Potassium Acetate 60 meq/Magnesium Sulfate 14 meq/ Multivitamins 10 ml/Chromium/

Copper/Manganese/ Seleni/Zn 1 ml/ Insulin Human Regular 15 unit/ Total 

Parenteral Nutrition/Amino Acids/Dextrose/ Fat Emulsion Intravenous 1,920 ml @  

80 mls/hr TPN  CONT IV  Last administered on 6/21/20at 22:22;  Start 6/21/20 at 

22:00;  Stop 6/22/20 at 21:59;  Status DC


Furosemide (Lasix) 20 mg 1X  ONCE IVP  Last administered on 6/22/20at 11:07;  

Start 6/22/20 at 10:30;  Stop 6/22/20 at 10:34;  Status DC


Potassium Acetate 60 meq/Magnesium Sulfate 14 meq/ Multivitamins 10 ml/Chromium/

Copper/Manganese/ Seleni/Zn 1 ml/ Insulin Human Regular 15 unit/ Sodium Chloride

20 meq/Total Parenteral Nutrition/Amino Acids/Dextrose/ Fat Emulsion Intravenous

1,920 ml @  80 mls/hr TPN  CONT IV  Last administered on 6/22/20at 21:54;  Start

6/22/20 at 22:00;  Stop 6/23/20 at 21:59;  Status DC


Potassium Acetate 30 meq/Magnesium Sulfate 14 meq/ Multivitamins 10 ml/Chromium/

Copper/Manganese/ Seleni/Zn 1 ml/ Insulin Human Regular 15 unit/ Sodium Chloride

20 meq/Potassium Chloride 30 meq/ Total Parenteral Nutrition/Amino 

Acids/Dextrose/ Fat Emulsion Intravenous 1,920 ml @  80 mls/hr TPN  CONT IV  

Last administered on 6/23/20at 21:46;  Start 6/23/20 at 22:00;  Stop 6/24/20 at 

21:59;  Status DC


Sodium Chloride 80 meq/Potassium Chloride 30 meq/ Potassium Acetate 30 

meq/Magnesium Sulfate 14 meq/ Multivitamins 10 ml/Chromium/ Copper/Manganese/ 

Seleni/Zn 1 ml/ Insulin Human Regular 15 unit/ Total Parenteral Nutrition/Amino 

Acids/Dextrose/ Fat Emulsion Intravenous 1,920 ml @  80 mls/hr TPN  CONT IV  

Last administered on 6/24/20at 22:33;  Start 6/24/20 at 22:00;  Stop 6/25/20 at 

21:59;  Status DC


Furosemide (Lasix) 40 mg 1X  ONCE IVP  Last administered on 6/24/20at 16:27;  

Start 6/24/20 at 15:30;  Stop 6/24/20 at 15:33;  Status DC


Albumin Human 250 ml @  62.5 mls/hr 1X  ONCE IV  Last administered on 6/24/20at 

16:27;  Start 6/24/20 at 15:30;  Stop 6/24/20 at 19:29;  Status DC


Sodium Chloride 80 meq/Potassium Chloride 30 meq/ Potassium Acetate 30 

meq/Magnesium Sulfate 14 meq/ Multivitamins 10 ml/Chromium/ Copper/Manganese/ 

Seleni/Zn 1 ml/ Insulin Human Regular 15 unit/ Total Parenteral Nutrition/Amino 

Acids/Dextrose/ Fat Emulsion Intravenous 1,920 ml @  80 mls/hr TPN  CONT IV  

Last administered on 6/25/20at 22:25;  Start 6/25/20 at 22:00;  Stop 6/26/20 at 

21:59;  Status DC


Sodium Chloride 80 meq/Potassium Chloride 30 meq/ Potassium Acetate 30 

meq/Magnesium Sulfate 14 meq/ Multivitamins 10 ml/Chromium/ Copper/Manganese/ 

Seleni/Zn 1 ml/ Insulin Human Regular 15 unit/ Total Parenteral Nutrition/Amino 

Acids/Dextrose/ Fat Emulsion Intravenous 1,920 ml @  80 mls/hr TPN  CONT IV  

Last administered on 6/26/20at 21:32;  Start 6/26/20 at 22:00;  Stop 6/27/20 at 

21:59;  Status DC


Sodium Chloride 80 meq/Potassium Chloride 30 meq/ Potassium Acetate 30 

meq/Magnesium Sulfate 14 meq/ Multivitamins 10 ml/Chromium/ Copper/Manganese/ 

Seleni/Zn 1 ml/ Insulin Human Regular 15 unit/ Total Parenteral Nutrition/Amino 

Acids/Dextrose/ Fat Emulsion Intravenous 1,920 ml @  80 mls/hr TPN  CONT IV  

Last administered on 6/27/20at 21:53;  Start 6/27/20 at 22:00;  Stop 6/28/20 at 

21:59;  Status DC


Acetylcysteine (Mucomyst 20% Resp Treatment) 600 mg RTBID NEB  Last administered

on 7/13/20at 08:28;  Start 6/27/20 at 12:00


Sodium Chloride 80 meq/Potassium Chloride 30 meq/ Potassium Acetate 30 

meq/Magnesium Sulfate 14 meq/ Multivitamins 10 ml/Chromium/ Copper/Manganese/ 

Seleni/Zn 1 ml/ Insulin Human Regular 15 unit/ Total Parenteral Nutrition/Amino 

Acids/Dextrose/ Fat Emulsion Intravenous 1,920 ml @  80 mls/hr TPN  CONT IV  

Last administered on 6/28/20at 22:06;  Start 6/28/20 at 22:00;  Stop 6/29/20 at 

21:59;  Status DC


Meropenem 500 mg/ Sodium Chloride 50 ml @  100 mls/hr Q6HRS IV  Last 

administered on 7/13/20at 05:38;  Start 6/28/20 at 18:00


Daptomycin 500 mg/ Sodium Chloride 50 ml @  100 mls/hr Q24H IV  Last 

administered on 7/6/20at 21:47;  Start 6/28/20 at 19:00;  Stop 7/7/20 at 08:13; 

Status DC


Sodium Chloride 80 meq/Potassium Chloride 30 meq/ Potassium Acetate 30 

meq/Magnesium Sulfate 14 meq/ Multivitamins 10 ml/Chromium/ Copper/Manganese/ 

Seleni/Zn 1 ml/ Insulin Human Regular 15 unit/ Total Parenteral Nutrition/Amino 

Acids/Dextrose/ Fat Emulsion Intravenous 1,920 ml @  80 mls/hr TPN  CONT IV  

Last administered on 6/29/20at 22:09;  Start 6/29/20 at 22:00;  Stop 6/30/20 at 

21:59;  Status DC


Heparin Sodium (Porcine) 1000 unit/Sodium Chloride 1,001 ml @  1,001 mls/hr 1X  

ONCE IRR ;  Start 6/30/20 at 06:00;  Stop 6/30/20 at 06:59;  Status DC


Propofol (Diprivan) 200 mg STK-MED ONCE IV ;  Start 6/30/20 at 07:44;  Stop 

6/30/20 at 07:44;  Status DC


Lidocaine HCl (Lidocaine Pf 2% Vial) 5 ml STK-MED ONCE .ROUTE ;  Start 6/30/20 

at 07:44;  Stop 6/30/20 at 07:44;  Status DC


Fentanyl Citrate (Fentanyl 2ml Vial) 100 mcg STK-MED ONCE .ROUTE ;  Start 

6/30/20 at 07:44;  Stop 6/30/20 at 07:44;  Status DC


Rocuronium Bromide (Zemuron) 100 mg STK-MED ONCE .ROUTE ;  Start 6/30/20 at 

07:44;  Stop 6/30/20 at 07:44;  Status DC


Micafungin Sodium 100 mg/Dextrose 100 ml @  100 mls/hr Q24H IV  Last 

administered on 7/13/20at 08:28;  Start 6/30/20 at 08:30


Bupivacaine HCl/ Epinephrine Bitart (Sensorcain-Epi 0.5%-1:[id number] Mpf) 30 ml 

STK-MED ONCE .ROUTE ;  Start 6/30/20 at 08:34;  Stop 6/30/20 at 08:35;  Status 

DC


Iohexol (Omnipaque 300 Mg/ml) 50 ml STK-MED ONCE .ROUTE  Last administered on 

6/30/20at 13:30;  Start 6/30/20 at 08:35;  Stop 6/30/20 at 08:35;  Status DC


Sodium Chloride 80 meq/Potassium Chloride 30 meq/ Potassium Acetate 30 

meq/Magnesium Sulfate 14 meq/ Multivitamins 10 ml/Chromium/ Copper/Manganese/ 

Seleni/Zn 1 ml/ Insulin Human Regular 15 unit/ Total Parenteral Nutrition/Amino 

Acids/Dextrose/ Fat Emulsion Intravenous 1,920 ml @  80 mls/hr TPN  CONT IV  

Last administered on 7/1/20at 01:22;  Start 6/30/20 at 22:00;  Stop 7/1/20 at 

21:59;  Status DC


Phenylephrine HCl (Ken-Synephrine Inj) 10 mg STK-MED ONCE .ROUTE ;  Start 

6/30/20 at 10:15;  Stop 6/30/20 at 10:15;  Status DC


Desflurane (Suprane) 90 ml STK-MED ONCE IH ;  Start 6/30/20 at 10:18;  Stop 

6/30/20 at 10:19;  Status DC


Albumin Human 500 ml @  As Directed STK-MED ONCE IV ;  Start 6/30/20 at 11:06;  

Stop 6/30/20 at 11:06;  Status DC


Vasopressin (Vasostrict) 20 unit STK-MED ONCE .ROUTE ;  Start 6/30/20 at 12:23; 

Stop 6/30/20 at 12:23;  Status DC


Phenylephrine HCl (Ken-Synephrine Inj) 10 mg STK-MED ONCE .ROUTE ;  Start 

6/30/20 at 13:33;  Stop 6/30/20 at 13:33;  Status DC


Phenylephrine HCl (Ken-Synephrine Inj) 10 mg STK-MED ONCE .ROUTE ;  Start 

6/30/20 at 13:33;  Stop 6/30/20 at 13:33;  Status DC


Ondansetron HCl (Zofran) 4 mg STK-MED ONCE .ROUTE ;  Start 6/30/20 at 13:33;  

Stop 6/30/20 at 13:33;  Status DC


Enoxaparin Sodium (Lovenox 40mg Syringe) 40 mg Q24H SQ  Last administered on 

7/13/20at 09:13;  Start 7/1/20 at 08:00


Sodium Chloride (Normal Saline Flush) 3 ml QSHIFT  PRN IV AFTER MEDS AND BLOOD 

DRAWS;  Start 6/30/20 at 14:45


Naloxone HCl (Narcan) 0.4 mg PRN Q2MIN  PRN IV SEE INSTRUCTIONS;  Start 6/30/20 

at 14:45


Sodium Chloride 1,000 ml @  25 mls/hr Q24H IV  Last administered on 7/12/20at 

14:27;  Start 6/30/20 at 14:33


Morphine Sulfate (Morphine Sulfate) 1 mg PRN Q1HR  PRN IV PAIN;  Start 6/30/20 

at 14:45


Midazolam HCl 100 mg/Sodium Chloride 100 ml @ 1 mls/hr CONT  PRN IV SEE I/O 

RECORD Last administered on 7/3/20at 18:48;  Start 6/30/20 at 14:45


Phenylephrine HCl (PHENYLEPHRINE in 0.9% NACL PF) 1 mg STK-MED ONCE IV ;  Start 

6/30/20 at 14:44;  Stop 6/30/20 at 14:45;  Status DC


Ephedrine Sulfate (ePHEDrine PF IN SALINE SYRINGE) 50 mg STK-MED ONCE IV ;  

Start 6/30/20 at 14:45;  Stop 6/30/20 at 14:45;  Status DC


Vasopressin 20 unit/Dextrose 101 ml @  12 mls/hr CONT  PRN IV SEE I/O RECORD 

Last administered on 7/7/20at 04:17;  Start 6/30/20 at 15:30


Sodium Chloride 1,000 ml @  1,000 mls/hr 1X  ONCE IV  Last administered on 

6/30/20at 15:42;  Start 6/30/20 at 15:45;  Stop 6/30/20 at 16:44;  Status DC


Albumin Human 500 ml @  125 mls/hr 1X  ONCE IV ;  Start 6/30/20 at 16:00;  Stop 

6/30/20 at 19:59;  Status DC


Albumin Human 500 ml @  125 mls/hr PRN Q1HR  PRN IV PER PROTOCOL;  Start 6/30/20

at 15:45


Magnesium Sulfate 50 ml @ 25 mls/hr 1X  ONCE IV  Last administered on 6/30/20at 

17:02;  Start 6/30/20 at 16:30;  Stop 6/30/20 at 18:29;  Status DC


Sodium Bicarbonate (Sodium Bicarb Adult 8.4% Syr) 50 meq STK-MED ONCE .ROUTE ;  

Start 6/30/20 at 16:20;  Stop 6/30/20 at 16:20;  Status DC


Sodium Bicarbonate (Sodium Bicarb Adult 8.4% Syr) 100 meq 1X  ONCE IV  Last 

administered on 6/30/20at 17:07;  Start 6/30/20 at 16:30;  Stop 6/30/20 at 

16:31;  Status DC


Sodium Bicarbonate 150 meq/Dextrose 1,150 ml @  75 mls/hr 1X  ONCE IV  Last ad

ministered on 6/30/20at 20:02;  Start 6/30/20 at 16:30;  Stop 7/1/20 at 07:49;  

Status DC


Sodium Chloride 80 meq/Potassium Chloride 30 meq/ Potassium Acetate 30 

meq/Magnesium Sulfate 14 meq/ Multivitamins 10 ml/Chromium/ Copper/Manganese/ 

Seleni/Zn 1 ml/ Insulin Human Regular 15 unit/ Total Parenteral Nutrition/Amino 

Acids/Dextrose/ Fat Emulsion Intravenous 1,920 ml @  80 mls/hr TPN  CONT IV  

Last administered on 7/1/20at 23:05;  Start 7/1/20 at 22:00;  Stop 7/2/20 at 

21:59;  Status DC


Sodium Chloride 100 meq/Potassium Chloride 30 meq/ Potassium Acetate 30 

meq/Magnesium Sulfate 12 meq/ Multivitamins 10 ml/Chromium/ Copper/Manganese/ 

Seleni/Zn 1 ml/ Insulin Human Regular 15 unit/ Total Parenteral Nutrition/Amino 

Acids/Dextrose/ Fat Emulsion Intravenous 1,920 ml @  80 mls/hr TPN  CONT IV  

Last administered on 7/2/20at 21:52;  Start 7/2/20 at 22:00;  Stop 7/3/20 at 

21:59;  Status DC


Sodium Chloride 100 meq/Potassium Chloride 30 meq/ Potassium Acetate 30 

meq/Magnesium Sulfate 12 meq/ Multivitamins 10 ml/Chromium/ Copper/Manganese/ 

Seleni/Zn 1 ml/ Insulin Human Regular 15 unit/ Total Parenteral Nutrition/Amino 

Acids/Dextrose/ Fat Emulsion Intravenous 1,920 ml @  80 mls/hr TPN  CONT IV  

Last administered on 7/3/20at 21:46;  Start 7/3/20 at 22:00;  Stop 7/4/20 at 21:

59;  Status DC


Sodium Chloride 100 meq/Potassium Chloride 30 meq/ Potassium Acetate 30 

meq/Magnesium Sulfate 12 meq/ Multivitamins 10 ml/Chromium/ Copper/Manganese/ 

Seleni/Zn 1 ml/ Insulin Human Regular 15 unit/ Total Parenteral Nutrition/Amino 

Acids/Dextrose/ Fat Emulsion Intravenous 1,800 ml @  75 mls/hr TPN  CONT IV  

Last administered on 7/4/20at 22:04;  Start 7/4/20 at 22:00;  Stop 7/5/20 at 

21:59;  Status DC


Fentanyl Citrate 55 ml @ 0 mls/hr CONT  PRN IV SEE COMMENTS Last administered on

7/6/20at 23:55;  Start 7/4/20 at 13:00;  Stop 7/9/20 at 17:28;  Status DC


Sodium Chloride 100 meq/Potassium Chloride 30 meq/ Potassium Acetate 30 

meq/Magnesium Sulfate 12 meq/ Multivitamins 10 ml/Chromium/ Copper/Manganese/ 

Seleni/Zn 1 ml/ Insulin Human Regular 15 unit/ Total Parenteral Nutrition/Amino 

Acids/Dextrose/ Fat Emulsion Intravenous 1,680 ml @  70 mls/hr TPN  CONT IV  

Last administered on 7/5/20at 21:23;  Start 7/5/20 at 22:00;  Stop 7/6/20 at 

21:59;  Status DC


Sodium Chloride 110 meq/Potassium Chloride 30 meq/ Potassium Acetate 30 

meq/Magnesium Sulfate 15 meq/ Multivitamins 10 ml/Chromium/ Copper/Manganese/ 

Seleni/Zn 1 ml/ Insulin Human Regular 15 unit/ Total Parenteral Nutrition/Amino 

Acids/Dextrose/ Fat Emulsion Intravenous 1,680 ml @  70 mls/hr TPN  CONT IV  

Last administered on 7/6/20at 21:48;  Start 7/6/20 at 22:00;  Stop 7/7/20 at 

21:59;  Status DC


Sodium Chloride 110 meq/Potassium Chloride 30 meq/ Potassium Acetate 30 

meq/Magnesium Sulfate 15 meq/ Multivitamins 10 ml/Chromium/ Copper/Manganese/ 

Seleni/Zn 1 ml/ Insulin Human Regular 15 unit/ Total Parenteral Nutrition/Amino 

Acids/Dextrose/ Fat Emulsion Intravenous 1,680 ml @  70 mls/hr TPN  CONT IV  

Last administered on 7/7/20at 21:33;  Start 7/7/20 at 22:00;  Stop 7/8/20 at 

21:59;  Status DC


Sodium Chloride 110 meq/Potassium Chloride 30 meq/ Potassium Acetate 30 

meq/Magnesium Sulfate 15 meq/ Multivitamins 10 ml/Chromium/ Copper/Manganese/ 

Seleni/Zn 1 ml/ Insulin Human Regular 15 unit/ Total Parenteral Nutrition/Amino 

Acids/Dextrose/ Fat Emulsion Intravenous 1,680 ml @  70 mls/hr TPN  CONT IV  

Last administered on 7/8/20at 21:51;  Start 7/8/20 at 22:00;  Stop 7/9/20 at 

21:59;  Status DC


Sodium Chloride 90 meq/Potassium Chloride 30 meq/ Potassium Acetate 30 

meq/Magnesium Sulfate 15 meq/ Multivitamins 10 ml/Chromium/ Copper/Manganese/ 

Seleni/Zn 1 ml/ Insulin Human Regular 15 unit/ Total Parenteral Nutrition/Amino 

Acids/Dextrose/ Fat Emulsion Intravenous 1,680 ml @  70 mls/hr TPN  CONT IV  

Last administered on 7/9/20at 22:38;  Start 7/9/20 at 22:00;  Stop 7/10/20 at 

21:59;  Status DC


Fentanyl Citrate 30 ml @ 0 mls/hr CONT  PRN IV SEE I/O RECORD;  Start 7/9/20 at 

17:30


Fentanyl (Duragesic 12mcg/ Hr Patch) 1 patch Q3DAYS TD  Last administered on 

7/10/20at 18:03;  Start 7/10/20 at 09:00


Sodium Chloride 90 meq/Potassium Chloride 30 meq/ Potassium Acetate 30 

meq/Magnesium Sulfate 15 meq/ Multivitamins 10 ml/Chromium/ Copper/Manganese/ 

Seleni/Zn 1 ml/ Insulin Human Regular 15 unit/ Total Parenteral Nutrition/Amino 

Acids/Dextrose/ Fat Emulsion Intravenous 1,680 ml @  70 mls/hr TPN  CONT IV  

Last administered on 7/10/20at 21:59;  Start 7/10/20 at 22:00;  Stop 7/11/20 at 

21:59;  Status DC


Sodium Chloride 90 meq/Potassium Chloride 30 meq/ Potassium Acetate 30 

meq/Magnesium Sulfate 15 meq/ Multivitamins 10 ml/Chromium/ Copper/Manganese/ 

Seleni/Zn 1 ml/ Insulin Human Regular 15 unit/ Total Parenteral Nutrition/Amino 

Acids/Dextrose/ Fat Emulsion Intravenous 1,680 ml @  70 mls/hr TPN  CONT IV  

Last administered on 7/11/20at 21:35;  Start 7/11/20 at 22:00;  Stop 7/12/20 at 

21:59;  Status DC


Vancomycin HCl (Vanco Per Pharmacy) 1 each PRN DAILY  PRN MC SEE COMMENTS Last 

administered on 7/12/20at 16:07;  Start 7/12/20 at 09:15


Ciprofloxacin/ Dextrose 200 ml @  200 mls/hr Q12HR IV  Last administered on 

7/13/20at 08:31;  Start 7/12/20 at 10:00


Vancomycin HCl 2 gm/Sodium Chloride 500 ml @  250 mls/hr 1X  ONCE IV  Last 

administered on 7/12/20at 10:34;  Start 7/12/20 at 10:00;  Stop 7/12/20 at 

11:59;  Status DC


Sodium Chloride 90 meq/Potassium Chloride 30 meq/ Potassium Acetate 30 

meq/Magnesium Sulfate 15 meq/ Multivitamins 10 ml/Chromium/ Copper/Manganese/ 

Seleni/Zn 1 ml/ Insulin Human Regular 15 unit/ Total Parenteral Nutrition/Amino 

Acids/Dextrose/ Fat Emulsion Intravenous 1,680 ml @  70 mls/hr TPN  CONT IV  

Last administered on 7/12/20at 22:02;  Start 7/12/20 at 22:00;  Stop 7/13/20 at 

21:59


Diphenhydramine HCl (Benadryl) 25 mg 1X  ONCE IVP  Last administered on 7 /12/20at 14:26;  Start 7/12/20 at 14:30;  Stop 7/12/20 at 14:31;  Status DC


Vancomycin HCl 1.5 gm/Sodium Chloride 500 ml @  250 mls/hr Q8H IV  Last 

administered on 7/13/20at 03:08;  Start 7/12/20 at 18:30


Vancomycin HCl (Vancomycin Trough Level) 1 each 1X  ONCE MC ;  Start 7/13/20 at 

10:00;  Stop 7/13/20 at 10:01





Active Scripts


Active


Reported


Bisoprolol Fumarate 5 Mg Tablet 10 Mg PO DAILY


Vitals/I & O





Vital Sign - Last 24 Hours








 7/12/20 7/12/20 7/12/20 7/12/20





 10:00 11:00 11:50 12:00


 


Temp    100.0





    100.0


 


Pulse 122 124  127


 


Resp 41 41  38


 


B/P (MAP) 138/76 (96) 150/91 (110)  133/78 (96)


 


Pulse Ox 97 98  100


 


O2 Delivery Trach shield Trach shield Trach Collar Trach shield


 


O2 Flow Rate 9.0 9.0 9.0 9.0


 


    





    





 7/12/20 7/12/20 7/12/20 7/12/20





 12:06 12:42 13:00 13:15


 


Pulse   126 


 


Resp  40 38 33


 


B/P (MAP)   146/87 (106) 


 


Pulse Ox 97 100 100 


 


O2 Delivery Tracheal Collar Tracheal Collar Trach shield Tracheal Collar


 


O2 Flow Rate 8.0 9.0 9.0 9.0





 7/12/20 7/12/20 7/12/20 7/12/20





 14:00 15:00 15:34 16:00


 


Temp 99.2   100.2





 99.2   100.2


 


Pulse 123 126  127


 


Resp 36 32  28


 


B/P (MAP) 132/86 (101) 153/91 (111)  113/91 (98)


 


Pulse Ox 95 96 97 100


 


O2 Delivery Trach shield Ventilator Ventilator Ventilator


 


O2 Flow Rate 9.0   


 


    





    





 7/12/20 7/12/20 7/12/20 7/12/20





 16:00 17:00 17:30 18:00


 


Temp    101.5





    101.5


 


Pulse  134  134


 


Resp  26  28


 


B/P (MAP)  152/84 (106)  149/80 (103)


 


Pulse Ox  97 97 97


 


O2 Delivery Mechanical Ventilator Ventilator Ventilator Ventilator


 


    





    





 7/12/20 7/12/20 7/12/20 7/12/20





 18:01 18:02 18:35 19:00


 


Pulse 134   114


 


Resp  33 28 20


 


B/P (MAP) 152/84   117/66 (83)


 


Pulse Ox  97 98 98


 


O2 Delivery  Ventilator Ventilator Ventilator





 7/12/20 7/12/20 7/12/20 7/12/20





 20:00 20:00 20:20 21:00


 


Temp  100.4  





  100.4  


 


Pulse  110  110


 


Resp  20  12


 


B/P (MAP)  144/81 (102)  129/76 (93)


 


Pulse Ox  99 100 100


 


O2 Delivery Mechanical Ventilator Ventilator Ventilator Ventilator


 


    





    





 7/12/20 7/12/20 7/12/20 7/13/20





 22:00 23:00 23:16 00:00


 


Pulse 106 113  


 


Resp 12 18  


 


B/P (MAP) 119/73 (88) 116/71 (86)  


 


Pulse Ox 100 99 100 


 


O2 Delivery Ventilator Ventilator Ventilator Mechanical Ventilator





 7/13/20 7/13/20 7/13/20 7/13/20





 00:00 01:00 02:00 02:38


 


Temp 99.9   





 99.9   


 


Pulse 114 115 116 


 


Resp 20 21 25 


 


B/P (MAP) 116/81 (93) 125/77 (93) 119/78 (92) 


 


Pulse Ox 99 100 100 99


 


O2 Delivery Ventilator Ventilator Ventilator Ventilator


 


    





    





 7/13/20 7/13/20 7/13/20 7/13/20





 03:00 03:45 04:00 04:40


 


Temp   100.3 





   100.3 


 


Pulse 120  118 


 


Resp 31  20 


 


B/P (MAP) 111/73 (86)  131/85 (100) 


 


Pulse Ox 99  100 100


 


O2 Delivery Ventilator Mechanical Ventilator Ventilator Ventilator


 


    





    





 7/13/20 7/13/20 7/13/20 





 05:00 06:00 08:28 


 


Pulse 119 116  


 


Resp 19 20  


 


B/P (MAP) 138/98 (111) 148/74 (98)  


 


Pulse Ox 99 100 97 


 


O2 Delivery Ventilator Ventilator Ventilator 














Intake and Output   


 


 7/12/20 7/12/20 7/13/20





 15:00 23:00 07:00


 


Intake Total 50 ml 200 ml 2584 ml


 


Output Total 820 ml 1135 ml 1115 ml


 


Balance -770 ml -935 ml 1469 ml











Justicifation of Admission Dx:


Justifications for Admission:


Justification of Admission Dx:  Yes











CLEMENTINA PANTOJA MD           Jul 13, 2020 09:27

## 2020-07-13 NOTE — PDOC
PULMONARY PROGRESS NOTES


Subjective


Patient on trach shield


Vitals





Vital Signs








  Date Time  Temp Pulse Resp B/P (MAP) Pulse Ox O2 Delivery O2 Flow Rate FiO2


 


7/13/20 06:00  116 20 148/74 (98) 100 Ventilator  


 


7/13/20 04:00 100.3       





 100.3       


 


7/12/20 14:00       9.0 








General:  Alert


HEENT:  Other (trach site ok)


Lungs:  Crackles


Cardiovascular:  S1, S2


Abdomen:  Soft, Non-tender, Other (multiple ROBERT drains )


Neuro Exam:  Alert


Extremities:  Other (+1 BLE edema)


Skin:  Warm


Labs





Laboratory Tests








Test


 7/11/20


11:17 7/11/20


17:27 7/12/20


00:26 7/12/20


05:15


 


Glucose (Fingerstick)


 144 mg/dL


(70-99) 116 mg/dL


(70-99) 120 mg/dL


(70-99) 





 


White Blood Count


 


 


 


 18.5 x10^3/uL


(4.0-11.0)


 


Red Blood Count


 


 


 


 2.99 x10^6/uL


(3.50-5.40)


 


Hemoglobin


 


 


 


 8.8 g/dL


(12.0-15.5)


 


Hematocrit


 


 


 


 26.3 %


(36.0-47.0)


 


Mean Corpuscular Volume    88 fL () 


 


Mean Corpuscular Hemoglobin    29 pg (25-35) 


 


Mean Corpuscular Hemoglobin


Concent 


 


 


 33 g/dL


(31-37)


 


Red Cell Distribution Width


 


 


 


 15.0 %


(11.5-14.5)


 


Platelet Count


 


 


 


 693 x10^3/uL


(140-400)


 


Neutrophils (%) (Auto)    82 % (31-73) 


 


Lymphocytes (%) (Auto)    10 % (24-48) 


 


Monocytes (%) (Auto)    7 % (0-9) 


 


Eosinophils (%) (Auto)    1 % (0-3) 


 


Basophils (%) (Auto)    0 % (0-3) 


 


Neutrophils # (Auto)


 


 


 


 15.1 x10^3/uL


(1.8-7.7)


 


Lymphocytes # (Auto)


 


 


 


 1.9 x10^3/uL


(1.0-4.8)


 


Monocytes # (Auto)


 


 


 


 1.3 x10^3/uL


(0.0-1.1)


 


Eosinophils # (Auto)


 


 


 


 0.1 x10^3/uL


(0.0-0.7)


 


Basophils # (Auto)


 


 


 


 0.0 x10^3/uL


(0.0-0.2)


 


Sodium Level


 


 


 


 139 mmol/L


(136-145)


 


Potassium Level


 


 


 


 4.6 mmol/L


(3.5-5.1)


 


Chloride Level


 


 


 


 103 mmol/L


()


 


Carbon Dioxide Level


 


 


 


 30 mmol/L


(21-32)


 


Anion Gap    6 (6-14) 


 


Blood Urea Nitrogen


 


 


 


 11 mg/dL


(7-20)


 


Creatinine


 


 


 


 0.5 mg/dL


(0.6-1.0)


 


Estimated GFR


(Cockcroft-Gault) 


 


 


 131.1 





 


BUN/Creatinine Ratio    22 (6-20) 


 


Glucose Level


 


 


 


 122 mg/dL


(70-99)


 


Calcium Level


 


 


 


 9.3 mg/dL


(8.5-10.1)


 


Total Bilirubin


 


 


 


 0.5 mg/dL


(0.2-1.0)


 


Aspartate Amino Transf


(AST/SGOT) 


 


 


 23 U/L (15-37) 





 


Alanine Aminotransferase


(ALT/SGPT) 


 


 


 35 U/L (14-59) 





 


Alkaline Phosphatase


 


 


 


 156 U/L


()


 


Total Protein


 


 


 


 4.8 g/dL


(6.4-8.2)


 


Albumin


 


 


 


 1.5 g/dL


(3.4-5.0)


 


Albumin/Globulin Ratio    0.5 (1.0-1.7) 


 


Test


 7/12/20


12:38 7/12/20


17:58 7/13/20


00:21 7/13/20


05:35


 


Glucose (Fingerstick)


 302 mg/dL


(70-99) 106 mg/dL


(70-99) 124 mg/dL


(70-99) 





 


White Blood Count


 


 


 


 10.5 x10^3/uL


(4.0-11.0)


 


Red Blood Count


 


 


 


 2.48 x10^6/uL


(3.50-5.40)


 


Hemoglobin


 


 


 


 7.4 g/dL


(12.0-15.5)


 


Hematocrit


 


 


 


 21.7 %


(36.0-47.0)


 


Mean Corpuscular Volume    88 fL () 


 


Mean Corpuscular Hemoglobin    30 pg (25-35) 


 


Mean Corpuscular Hemoglobin


Concent 


 


 


 34 g/dL


(31-37)


 


Red Cell Distribution Width


 


 


 


 14.9 %


(11.5-14.5)


 


Platelet Count


 


 


 


 543 x10^3/uL


(140-400)


 


Neutrophils (%) (Auto)    83 % (31-73) 


 


Lymphocytes (%) (Auto)    8 % (24-48) 


 


Monocytes (%) (Auto)    8 % (0-9) 


 


Eosinophils (%) (Auto)    2 % (0-3) 


 


Basophils (%) (Auto)    0 % (0-3) 


 


Neutrophils # (Auto)


 


 


 


 8.7 x10^3/uL


(1.8-7.7)


 


Lymphocytes # (Auto)


 


 


 


 0.8 x10^3/uL


(1.0-4.8)


 


Monocytes # (Auto)


 


 


 


 0.8 x10^3/uL


(0.0-1.1)


 


Eosinophils # (Auto)


 


 


 


 0.2 x10^3/uL


(0.0-0.7)


 


Basophils # (Auto)


 


 


 


 0.0 x10^3/uL


(0.0-0.2)


 


Sodium Level


 


 


 


 137 mmol/L


(136-145)


 


Potassium Level


 


 


 


 4.3 mmol/L


(3.5-5.1)


 


Chloride Level


 


 


 


 104 mmol/L


()


 


Carbon Dioxide Level


 


 


 


 29 mmol/L


(21-32)


 


Anion Gap    4 (6-14) 


 


Blood Urea Nitrogen    9 mg/dL (7-20) 


 


Creatinine


 


 


 


 0.5 mg/dL


(0.6-1.0)


 


Estimated GFR


(Cockcroft-Gault) 


 


 


 131.1 





 


Glucose Level


 


 


 


 113 mg/dL


(70-99)


 


Calcium Level


 


 


 


 8.5 mg/dL


(8.5-10.1)


 


Phosphorus Level


 


 


 


 3.8 mg/dL


(2.6-4.7)


 


Magnesium Level


 


 


 


 2.1 mg/dL


(1.8-2.4)


 


Test


 7/13/20


05:37 


 


 





 


Glucose (Fingerstick)


 110 mg/dL


(70-99) 


 


 











Laboratory Tests








Test


 7/12/20


12:38 7/12/20


17:58 7/13/20


00:21 7/13/20


05:35


 


Glucose (Fingerstick)


 302 mg/dL


(70-99) 106 mg/dL


(70-99) 124 mg/dL


(70-99) 





 


White Blood Count


 


 


 


 10.5 x10^3/uL


(4.0-11.0)


 


Red Blood Count


 


 


 


 2.48 x10^6/uL


(3.50-5.40)


 


Hemoglobin


 


 


 


 7.4 g/dL


(12.0-15.5)


 


Hematocrit


 


 


 


 21.7 %


(36.0-47.0)


 


Mean Corpuscular Volume    88 fL () 


 


Mean Corpuscular Hemoglobin    30 pg (25-35) 


 


Mean Corpuscular Hemoglobin


Concent 


 


 


 34 g/dL


(31-37)


 


Red Cell Distribution Width


 


 


 


 14.9 %


(11.5-14.5)


 


Platelet Count


 


 


 


 543 x10^3/uL


(140-400)


 


Neutrophils (%) (Auto)    83 % (31-73) 


 


Lymphocytes (%) (Auto)    8 % (24-48) 


 


Monocytes (%) (Auto)    8 % (0-9) 


 


Eosinophils (%) (Auto)    2 % (0-3) 


 


Basophils (%) (Auto)    0 % (0-3) 


 


Neutrophils # (Auto)


 


 


 


 8.7 x10^3/uL


(1.8-7.7)


 


Lymphocytes # (Auto)


 


 


 


 0.8 x10^3/uL


(1.0-4.8)


 


Monocytes # (Auto)


 


 


 


 0.8 x10^3/uL


(0.0-1.1)


 


Eosinophils # (Auto)


 


 


 


 0.2 x10^3/uL


(0.0-0.7)


 


Basophils # (Auto)


 


 


 


 0.0 x10^3/uL


(0.0-0.2)


 


Sodium Level


 


 


 


 137 mmol/L


(136-145)


 


Potassium Level


 


 


 


 4.3 mmol/L


(3.5-5.1)


 


Chloride Level


 


 


 


 104 mmol/L


()


 


Carbon Dioxide Level


 


 


 


 29 mmol/L


(21-32)


 


Anion Gap    4 (6-14) 


 


Blood Urea Nitrogen    9 mg/dL (7-20) 


 


Creatinine


 


 


 


 0.5 mg/dL


(0.6-1.0)


 


Estimated GFR


(Cockcroft-Gault) 


 


 


 131.1 





 


Glucose Level


 


 


 


 113 mg/dL


(70-99)


 


Calcium Level


 


 


 


 8.5 mg/dL


(8.5-10.1)


 


Phosphorus Level


 


 


 


 3.8 mg/dL


(2.6-4.7)


 


Magnesium Level


 


 


 


 2.1 mg/dL


(1.8-2.4)


 


Test


 7/13/20


05:37 


 


 





 


Glucose (Fingerstick)


 110 mg/dL


(70-99) 


 


 











Medications





Active Scripts








 Medications  Dose


 Route/Sig


 Max Daily Dose Days Date Category


 


 Bisoprolol


 Fumarate 5 Mg


 Tablet  10 Mg


 PO DAILY


   3/16/20 Reported








Comments


ct reviewed 7/12/20, Decreased left-sided effusion after catheter placement. The




right-sided effusion has increased as has atelectasis.


 





 


There has been exchange or placement of multiple drainage 


tubes and a gastrojejunostomy tube. Both collections are smaller. No 


significant new abdominal fluid collection is seen. The jejunal component 


of the gastrojejunostomy tube appears to be looped in the proximal small 


bowel. 











ct abdomen /pelvis 6/6


1. Removal of the percutaneous pigtail drainage catheters since the prior 


exam. Sequela of pancreatitis with extensive pseudocysts again 


demonstrated, the right-sided collections are slightly larger since the 


prior exam, the left-sided collections are stable. See above.


2. Moderate to large left pleural effusion with atelectasis and collapse 


of most of the left lower lobe, stable. Small right pleural effusion is 


stable.


3. Gallstone.





ct chest 6/15 reviewed








 GRAM NEG COCCOBACILLI:MANY


        SQUAMOUS EPI CELL:RARE


        PMN (WBCs):FEW


        Unless otherwise specified, Testing Performed by:


        [facility]


        [address] [place] [address]


        For Inquires, the Physician may contact the Microbiology


        department at [phone]





  RESPIRATORY CULTURE  Final  


        Final





        MANY GRAM NEGATIVE RODS on 06/15/20 at 1107


        FINAL ID= [PSEUDOMONAS AERUGINOSA]


        MICRO CHARGES


        PSEUDOMONAS AERUGINOSA





  ANTIMICROBIAL SUSCEPTIBILITY  Final  


        Comment





        NEG ANSON 56


        PSEUDOMONAS AERUGINOSA


        ANTIBIOTIC                        RESULT          INTERPRETATION


        AMIKACIN                          <=16                  S


        AZTREONAM                         <=4                   S


        CEFTAZIDIME                       <=1                   S


        CIPROFLOXACIN                     <=0.25                S


        CEFEPIME                          <=2                   S


        CEFTAZIDIME/AVIBACTAM             <=4                   S


        GENTAMICIN                        <=2                   S


        LEVOFLOXACIN                      <=0.5                 S





Impression


.





IMPRESSION:


1.  Acute hypoxemic respiratory failure secondary to ARDS status post trach, 

developed anemia 6/7, blood drainage from RLQ abdomen drain site, and 

surrounding firmness  / developed septic shock 6/7 from abdomen source, required

levo 6/7


s/p 3 new drains 6/7 with brown color drainage,  


2.  Gallstone pancreatitis, now with ongoing bleeding from prior drain. Anemic. 

s/p Tx multiple units over several days


3.  septic shock/sepsis, recurrent 6/7, source abdomen. new fever  ? aspiration 

pneumonitis/pneumonia


4.  Acute kidney injury-, Off HD--renal function decling. suspect JED on CKD due

to hypotension , improved now


5.  Acute gallstone pancreatitis.


6.  Hypoalbuminemia.


7.  Moderate persistent effusions, s/p left thora  5/12, reaccumulation of left 

effusion. O2 requirement not changed. 


8.  Fever- ,hypotension. suspect recurrent sepsis/ likely pancreatic source.  

Per ID, per surgery--


9.  Chronic anemia-- ongoing / s/p PRBC


10. Covid 19 testing negative


11. Moderate to large ascites-S/P paracentisis


12.S/P paracentisis with 4 liters removed on 4/15/20


13. S/P IR drain placement on 5/8/2020, removal, re inserted 6/7


14. Depression/Anxiety 


15.,  Fever, per ID





6/30


S/P Exploratory laparotomy, lysis of adhesions, subtotal cholecystectomy with 

cholangiogram, gastrojejunostomy tube placement, pancreatic necrosectomy


leukocytosis- improving





Plan


.


Continue trach shield


Up to chair


Follow culture


Follow surgery input


DVT GI prophylaxis


ABX per ID 


merrem and micafungin 


Added vanc and cipro per id


f/u BC /resp cultures 


Continue TPN for nutrition support 


DVT/GI PPX


D/W RN and RT,











STEVE MIRANDA MD              Jul 13, 2020 08:24

## 2020-07-13 NOTE — PDOC
Objective:


Objective:


Tmax 101.5


Vital Signs:





                                   Vital Signs








  Date Time  Temp Pulse Resp B/P (MAP) Pulse Ox O2 Delivery O2 Flow Rate FiO2


 


7/13/20 12:19     97 Tracheal Collar 8.0 


 


7/13/20 10:01   28     


 


7/13/20 06:00  116  148/74 (98)    


 


7/13/20 04:00 100.3       





 100.3       








Labs:





Laboratory Tests








Test


 7/12/20


17:58 7/13/20


00:21 7/13/20


05:35 7/13/20


05:37


 


Glucose (Fingerstick) 106 mg/dL  124 mg/dL   110 mg/dL 


 


White Blood Count   10.5 x10^3/uL  


 


Red Blood Count   2.48 x10^6/uL  


 


Hemoglobin   7.4 g/dL  


 


Hematocrit   21.7 %  


 


Mean Corpuscular Volume   88 fL  


 


Mean Corpuscular Hemoglobin   30 pg  


 


Mean Corpuscular Hemoglobin


Concent 


 


 34 g/dL 


 





 


Red Cell Distribution Width   14.9 %  


 


Platelet Count   543 x10^3/uL  


 


Neutrophils (%) (Auto)   83 %  


 


Lymphocytes (%) (Auto)   8 %  


 


Monocytes (%) (Auto)   8 %  


 


Eosinophils (%) (Auto)   2 %  


 


Basophils (%) (Auto)   0 %  


 


Neutrophils # (Auto)   8.7 x10^3/uL  


 


Lymphocytes # (Auto)   0.8 x10^3/uL  


 


Monocytes # (Auto)   0.8 x10^3/uL  


 


Eosinophils # (Auto)   0.2 x10^3/uL  


 


Basophils # (Auto)   0.0 x10^3/uL  


 


Sodium Level   137 mmol/L  


 


Potassium Level   4.3 mmol/L  


 


Chloride Level   104 mmol/L  


 


Carbon Dioxide Level   29 mmol/L  


 


Anion Gap   4  


 


Blood Urea Nitrogen   9 mg/dL  


 


Creatinine   0.5 mg/dL  


 


Estimated GFR


(Cockcroft-Gault) 


 


 131.1 


 





 


Glucose Level   113 mg/dL  


 


Calcium Level   8.5 mg/dL  


 


Phosphorus Level   3.8 mg/dL  


 


Magnesium Level   2.1 mg/dL  


 


Test


 7/13/20


10:00 


 


 





 


Vancomycin Level Trough 25.0 mcg/mL    


 


Vancomycin Last Dose Date 07-13-20    


 


Vancomycin Last Dose Time 0230    








Imaging:


CXR 7/13


Impression: Improving right lower lobe atelectasis and/or infiltrate.





C/A/P CT 7/12


IMPRESSION: There has been exchange or placement of multiple drainage tubes and 

a gastrojejunostomy tube. Both collections are smaller. No significant new 

abdominal fluid collection is seen. The jejunal component of the 

gastrojejunostomy tube appears to be looped in the proximal small bowel.





PE:





GEN: NAD


HEART: tachycardic (mild)


LUNGS: trach collar


NEURO/PSYCH: sleeping, not awakened





A/P:


S/p pancreatic necrosectomy





--


Continue support.





Justicifation of Admission Dx:


Justifications for Admission:


Justification of Admission Dx:  Yes











RAGHAVENDRA MURCIA         Jul 13, 2020 12:59

## 2020-07-13 NOTE — PDOC
Infectious Disease Note


Subjective


Subjective


sleepy on vent





ROS


ROS


no n/v/d/


did have low grade fever





Vital Sign


Vital Signs





Vital Signs








  Date Time  Temp Pulse Resp B/P (MAP) Pulse Ox O2 Delivery O2 Flow Rate FiO2


 


7/13/20 06:00  116 20 148/74 (98) 100 Ventilator  


 


7/13/20 04:00 100.3       





 100.3       


 


7/12/20 14:00       9.0 











Physical Exam


PHYSICAL EXAM


GENERAL: Propped up in bed, awake, weak appearing 


HEENT: Pupils equal, oral cavity dry. NGT out, on vent


NECK:  Tracheostomy 


LUNGS: Diminished aeration bases,  CT on left 


HEART:  S1, S2, regular, tachy 


ABDOMEN: Sightly less distended, bowel sounds hypoactive, soft, richardson x 2, 3 ROBERT

drains, G-J tube and + wound vac 


: [place] in place 


EXTREMITIES: Generalized edema, no cyanosis. SCDs & Podus boots bilaterally, 


SKIN: warm touch. No signs of rash.  


NEURO: awake, mouthing some words, tracking 


LUE-PICC without signs of complications 


LUE art-line out, mottling about old art-line site is improving. RP palpable, 

cap refill brisk.





Labs


Lab





Laboratory Tests








Test


 7/12/20


12:38 7/12/20


17:58 7/13/20


00:21 7/13/20


05:35


 


Glucose (Fingerstick)


 302 mg/dL


(70-99) 106 mg/dL


(70-99) 124 mg/dL


(70-99) 





 


White Blood Count


 


 


 


 10.5 x10^3/uL


(4.0-11.0)


 


Red Blood Count


 


 


 


 2.48 x10^6/uL


(3.50-5.40)


 


Hemoglobin


 


 


 


 7.4 g/dL


(12.0-15.5)


 


Hematocrit


 


 


 


 21.7 %


(36.0-47.0)


 


Mean Corpuscular Volume    88 fL () 


 


Mean Corpuscular Hemoglobin    30 pg (25-35) 


 


Mean Corpuscular Hemoglobin


Concent 


 


 


 34 g/dL


(31-37)


 


Red Cell Distribution Width


 


 


 


 14.9 %


(11.5-14.5)


 


Platelet Count


 


 


 


 543 x10^3/uL


(140-400)


 


Neutrophils (%) (Auto)    83 % (31-73) 


 


Lymphocytes (%) (Auto)    8 % (24-48) 


 


Monocytes (%) (Auto)    8 % (0-9) 


 


Eosinophils (%) (Auto)    2 % (0-3) 


 


Basophils (%) (Auto)    0 % (0-3) 


 


Neutrophils # (Auto)


 


 


 


 8.7 x10^3/uL


(1.8-7.7)


 


Lymphocytes # (Auto)


 


 


 


 0.8 x10^3/uL


(1.0-4.8)


 


Monocytes # (Auto)


 


 


 


 0.8 x10^3/uL


(0.0-1.1)


 


Eosinophils # (Auto)


 


 


 


 0.2 x10^3/uL


(0.0-0.7)


 


Basophils # (Auto)


 


 


 


 0.0 x10^3/uL


(0.0-0.2)


 


Sodium Level


 


 


 


 137 mmol/L


(136-145)


 


Potassium Level


 


 


 


 4.3 mmol/L


(3.5-5.1)


 


Chloride Level


 


 


 


 104 mmol/L


()


 


Carbon Dioxide Level


 


 


 


 29 mmol/L


(21-32)


 


Anion Gap    4 (6-14) 


 


Blood Urea Nitrogen    9 mg/dL (7-20) 


 


Creatinine


 


 


 


 0.5 mg/dL


(0.6-1.0)


 


Estimated GFR


(Cockcroft-Gault) 


 


 


 131.1 





 


Glucose Level


 


 


 


 113 mg/dL


(70-99)


 


Calcium Level


 


 


 


 8.5 mg/dL


(8.5-10.1)


 


Phosphorus Level


 


 


 


 3.8 mg/dL


(2.6-4.7)


 


Magnesium Level


 


 


 


 2.1 mg/dL


(1.8-2.4)


 


Test


 7/13/20


05:37 


 


 





 


Glucose (Fingerstick)


 110 mg/dL


(70-99) 


 


 











Micro








Objective


Assessment


Patient with prolonged hospitalization more than 3 months


Multiple medical problems


Multiple surgical procedures





S/P Exp. 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 

parapsilosis & PSAE (I-merrem/ceftazidime/AZT/cefepime))


Leucocytosis -trending upward 


Fever 


Acute gallstone pancreatitis with persistent necrosis


  - 4/9.  CT A/P Increased ascites. Persistent evidence of necrotizing 

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


  - 4/27. status post ROBERT drain placement; C. parapsilosis. s/p drain 5/6 + yeast

& high amylase; s/p additional drain on 5/8. Drains removed. 


  -5/6. fluid  candida parapsilosis fluid, amylase high


  - 6/6 showed multiple pseudocysts, slight larger on the right. s/p drains x 3,

6/7.  + PSAE (MDRO-R Cefepime, Zosyn ANSON < 64) and yeast, 


  -6/7 s/p drain replacement x 3; fluid cult PSAE (MDRO), yeast; treated


  -7/12 CT A/P shows smaller fluid collections.         


Ascites s/p paracentesis 4/15 & 5/6. C. parapsilosis 


Cholelithiasis with thickening of the gallbladder wall.


JED, Hyperkalemia, Metabolic acidosis off dialysis


Acute hypoxic resp failure. trach/vent. sputum 6/13  + PSAE (I merrem) 


Pleural effusions s/p left thoracentesis, 5/12. no culture. s/p left chest tube,

6/15 no growth


Hypocalcemia 


Prediabetes


HTN


Anemia s/p PRBCs





Plan


Plan of Care


merrem and micafungin 


Add vanc per pharmacy protocal and cipro 


f/u BC /resp cultures 


Labs in am


wound care /drain management as directed


Contact isolation for CRE/MDRO


D/w nursing 





Critically ill











LINN FRANZ MD               Jul 13, 2020 08:04

## 2020-07-13 NOTE — PDOC
SURGICAL PROGRESS NOTE


Subjective


asleep


Vital Signs





Vital Signs








  Date Time  Temp Pulse Resp B/P (MAP) Pulse Ox O2 Delivery O2 Flow Rate FiO2


 


7/13/20 10:01   28  40 Tracheal Collar  


 


7/13/20 06:00  116  148/74 (98)    


 


7/13/20 04:00 100.3       





 100.3       


 


7/12/20 14:00       9.0 








I&O











Intake and Output 


 


 7/13/20





 06:59


 


Intake Total 2834 ml


 


Output Total 3110 ml


 


Balance -276 ml


 


 


 


IV Total 2784 ml


 


Tube Feeding 50 ml


 


Output Urine Total 1170 ml


 


Gastric Drainage Total 400 ml


 


Drainage Total 1540 ml








General:  Cooperative, No acute distress


Abdomen:  Soft, Other (drains in place)


Labs





Laboratory Tests








Test


 7/11/20


17:27 7/12/20


00:26 7/12/20


05:15 7/12/20


12:38


 


Glucose (Fingerstick)


 116 mg/dL


(70-99) 120 mg/dL


(70-99) 


 302 mg/dL


(70-99)


 


White Blood Count


 


 


 18.5 x10^3/uL


(4.0-11.0) 





 


Red Blood Count


 


 


 2.99 x10^6/uL


(3.50-5.40) 





 


Hemoglobin


 


 


 8.8 g/dL


(12.0-15.5) 





 


Hematocrit


 


 


 26.3 %


(36.0-47.0) 





 


Mean Corpuscular Volume   88 fL ()  


 


Mean Corpuscular Hemoglobin   29 pg (25-35)  


 


Mean Corpuscular Hemoglobin


Concent 


 


 33 g/dL


(31-37) 





 


Red Cell Distribution Width


 


 


 15.0 %


(11.5-14.5) 





 


Platelet Count


 


 


 693 x10^3/uL


(140-400) 





 


Neutrophils (%) (Auto)   82 % (31-73)  


 


Lymphocytes (%) (Auto)   10 % (24-48)  


 


Monocytes (%) (Auto)   7 % (0-9)  


 


Eosinophils (%) (Auto)   1 % (0-3)  


 


Basophils (%) (Auto)   0 % (0-3)  


 


Neutrophils # (Auto)


 


 


 15.1 x10^3/uL


(1.8-7.7) 





 


Lymphocytes # (Auto)


 


 


 1.9 x10^3/uL


(1.0-4.8) 





 


Monocytes # (Auto)


 


 


 1.3 x10^3/uL


(0.0-1.1) 





 


Eosinophils # (Auto)


 


 


 0.1 x10^3/uL


(0.0-0.7) 





 


Basophils # (Auto)


 


 


 0.0 x10^3/uL


(0.0-0.2) 





 


Sodium Level


 


 


 139 mmol/L


(136-145) 





 


Potassium Level


 


 


 4.6 mmol/L


(3.5-5.1) 





 


Chloride Level


 


 


 103 mmol/L


() 





 


Carbon Dioxide Level


 


 


 30 mmol/L


(21-32) 





 


Anion Gap   6 (6-14)  


 


Blood Urea Nitrogen


 


 


 11 mg/dL


(7-20) 





 


Creatinine


 


 


 0.5 mg/dL


(0.6-1.0) 





 


Estimated GFR


(Cockcroft-Gault) 


 


 131.1 


 





 


BUN/Creatinine Ratio   22 (6-20)  


 


Glucose Level


 


 


 122 mg/dL


(70-99) 





 


Calcium Level


 


 


 9.3 mg/dL


(8.5-10.1) 





 


Total Bilirubin


 


 


 0.5 mg/dL


(0.2-1.0) 





 


Aspartate Amino Transf


(AST/SGOT) 


 


 23 U/L (15-37) 


 





 


Alanine Aminotransferase


(ALT/SGPT) 


 


 35 U/L (14-59) 


 





 


Alkaline Phosphatase


 


 


 156 U/L


() 





 


Total Protein


 


 


 4.8 g/dL


(6.4-8.2) 





 


Albumin


 


 


 1.5 g/dL


(3.4-5.0) 





 


Albumin/Globulin Ratio   0.5 (1.0-1.7)  


 


Test


 7/12/20


17:58 7/13/20


00:21 7/13/20


05:35 7/13/20


05:37


 


Glucose (Fingerstick)


 106 mg/dL


(70-99) 124 mg/dL


(70-99) 


 110 mg/dL


(70-99)


 


White Blood Count


 


 


 10.5 x10^3/uL


(4.0-11.0) 





 


Red Blood Count


 


 


 2.48 x10^6/uL


(3.50-5.40) 





 


Hemoglobin


 


 


 7.4 g/dL


(12.0-15.5) 





 


Hematocrit


 


 


 21.7 %


(36.0-47.0) 





 


Mean Corpuscular Volume   88 fL ()  


 


Mean Corpuscular Hemoglobin   30 pg (25-35)  


 


Mean Corpuscular Hemoglobin


Concent 


 


 34 g/dL


(31-37) 





 


Red Cell Distribution Width


 


 


 14.9 %


(11.5-14.5) 





 


Platelet Count


 


 


 543 x10^3/uL


(140-400) 





 


Neutrophils (%) (Auto)   83 % (31-73)  


 


Lymphocytes (%) (Auto)   8 % (24-48)  


 


Monocytes (%) (Auto)   8 % (0-9)  


 


Eosinophils (%) (Auto)   2 % (0-3)  


 


Basophils (%) (Auto)   0 % (0-3)  


 


Neutrophils # (Auto)


 


 


 8.7 x10^3/uL


(1.8-7.7) 





 


Lymphocytes # (Auto)


 


 


 0.8 x10^3/uL


(1.0-4.8) 





 


Monocytes # (Auto)


 


 


 0.8 x10^3/uL


(0.0-1.1) 





 


Eosinophils # (Auto)


 


 


 0.2 x10^3/uL


(0.0-0.7) 





 


Basophils # (Auto)


 


 


 0.0 x10^3/uL


(0.0-0.2) 





 


Sodium Level


 


 


 137 mmol/L


(136-145) 





 


Potassium Level


 


 


 4.3 mmol/L


(3.5-5.1) 





 


Chloride Level


 


 


 104 mmol/L


() 





 


Carbon Dioxide Level


 


 


 29 mmol/L


(21-32) 





 


Anion Gap   4 (6-14)  


 


Blood Urea Nitrogen   9 mg/dL (7-20)  


 


Creatinine


 


 


 0.5 mg/dL


(0.6-1.0) 





 


Estimated GFR


(Cockcroft-Gault) 


 


 131.1 


 





 


Glucose Level


 


 


 113 mg/dL


(70-99) 





 


Calcium Level


 


 


 8.5 mg/dL


(8.5-10.1) 





 


Phosphorus Level


 


 


 3.8 mg/dL


(2.6-4.7) 





 


Magnesium Level


 


 


 2.1 mg/dL


(1.8-2.4) 











Laboratory Tests








Test


 7/12/20


12:38 7/12/20


17:58 7/13/20


00:21 7/13/20


05:35


 


Glucose (Fingerstick)


 302 mg/dL


(70-99) 106 mg/dL


(70-99) 124 mg/dL


(70-99) 





 


White Blood Count


 


 


 


 10.5 x10^3/uL


(4.0-11.0)


 


Red Blood Count


 


 


 


 2.48 x10^6/uL


(3.50-5.40)


 


Hemoglobin


 


 


 


 7.4 g/dL


(12.0-15.5)


 


Hematocrit


 


 


 


 21.7 %


(36.0-47.0)


 


Mean Corpuscular Volume    88 fL () 


 


Mean Corpuscular Hemoglobin    30 pg (25-35) 


 


Mean Corpuscular Hemoglobin


Concent 


 


 


 34 g/dL


(31-37)


 


Red Cell Distribution Width


 


 


 


 14.9 %


(11.5-14.5)


 


Platelet Count


 


 


 


 543 x10^3/uL


(140-400)


 


Neutrophils (%) (Auto)    83 % (31-73) 


 


Lymphocytes (%) (Auto)    8 % (24-48) 


 


Monocytes (%) (Auto)    8 % (0-9) 


 


Eosinophils (%) (Auto)    2 % (0-3) 


 


Basophils (%) (Auto)    0 % (0-3) 


 


Neutrophils # (Auto)


 


 


 


 8.7 x10^3/uL


(1.8-7.7)


 


Lymphocytes # (Auto)


 


 


 


 0.8 x10^3/uL


(1.0-4.8)


 


Monocytes # (Auto)


 


 


 


 0.8 x10^3/uL


(0.0-1.1)


 


Eosinophils # (Auto)


 


 


 


 0.2 x10^3/uL


(0.0-0.7)


 


Basophils # (Auto)


 


 


 


 0.0 x10^3/uL


(0.0-0.2)


 


Sodium Level


 


 


 


 137 mmol/L


(136-145)


 


Potassium Level


 


 


 


 4.3 mmol/L


(3.5-5.1)


 


Chloride Level


 


 


 


 104 mmol/L


()


 


Carbon Dioxide Level


 


 


 


 29 mmol/L


(21-32)


 


Anion Gap    4 (6-14) 


 


Blood Urea Nitrogen    9 mg/dL (7-20) 


 


Creatinine


 


 


 


 0.5 mg/dL


(0.6-1.0)


 


Estimated GFR


(Cockcroft-Gault) 


 


 


 131.1 





 


Glucose Level


 


 


 


 113 mg/dL


(70-99)


 


Calcium Level


 


 


 


 8.5 mg/dL


(8.5-10.1)


 


Phosphorus Level


 


 


 


 3.8 mg/dL


(2.6-4.7)


 


Magnesium Level


 


 


 


 2.1 mg/dL


(1.8-2.4)


 


Test


 7/13/20


05:37 


 


 





 


Glucose (Fingerstick)


 110 mg/dL


(70-99) 


 


 











Problem List


Problems


Medical Problems:


(1) Acute pancreatitis


Status: Acute  





(2) Cholelithiasis


Status: Acute  








Assessment/Plan


supportive care





Justicifation of Admission Dx:


Justifications for Admission:


Justification of Admission Dx:  Yes











SAURAV NASH APRN            Jul 13, 2020 11:42

## 2020-07-13 NOTE — NUR
Pharmacy TPN Dosing Note



S: JESENIA BEAN is a 49 year old F Currently receiving Central Continuous TPN started 
03/18/20



B:Pertinent PMH: 

Necrotizing pancreatitis

Height: 5 feet, 8 inches

Weight: 94.602637 kg



Current diet: NPO 



LABS:

Sodium:    137 

Potassium: 4.3 

Chloride:  104 

Calcium:   8.5 

Corrected Calcium: 10.82 

Magnesium: 2.1 

CO2:       29 

SCr:       0.5 

Glucose:   110-124 

Albumin:   1.1 

AST:       25 

ALT:       37 



TPN FORMULA:

TPN TYPE:  Central Continuous

AMINO ACIDS:         85 gm

DEXTROSE:            250 gm

LIPIDS:              20 gm

SODIUM CHLORIDE:     90 mEq

SODIUM ACETATE:      - mEq

SODIUM PHOSPHATE:    - mmol

POTASSIUM CHLORIDE:  30 mEq

POTASSIUM ACETATE:   30 mEq

POTASSIUM PHOSPHATE: - mmol

MAGNESIUM:           15 mEq

CALCIUM:             - mEq

INSULIN:             15 units

MULTIPLE VITAMIN:    10 ml

TRACE ELEMENTS:      1 ml ml(s)



TPN PLAN:  

Tube feed not tolerated, on hold indefinitely. Macros increased per

dietary recommendation since TF off. Lytes stable, no other changes. BMP

in AM.





R: Continue TPN AS ABOVE.

Will monitor electrolytes, glucose, and tolerance to TPN.



 CANDACE BELTRE [facility], 07/13/20 1117

## 2020-07-13 NOTE — NUR
Pharmacy Vancomycin Dosing Note



S:Consulted to monitor and dose vancomycin started 07/12/20.



O:JESENIA BEAN is a 49 year old F with 

Abscess

HCAP .



Height: 5 feet, 8 inches

Weight: 94.621015 kg

Ideal Body Weight: 63.90 

Adjusted Body Weight: 75.98 

Dosing Weight: Actual



Other Antibiotics: 

CIPRO, MERREM, MICAFUGIN



LABS:

Last BUN: 9 

Last Creatinine: 0.5 

Creatinine Clearance: >100 mL/min

Last WBC: 10.5 

Last Procalcitonin: - 

Tmax (past 24 hours): 101.5 



Microbiology: 

7/12 BCX NGTD



I/O: 2834/3110 



Drug Levels:

Last Trough level: 25.0  on 07/13/20 at 1000 

Last dose given 07/13/20 at 0230 



Vancomycin Dosing:

Loading Dose: 2000 mg x1

Dosing Weight: Actual

Target Trough: 15-20





A: Based on: SUPRATHERAPEUTIC TROUGH,





P: 1. HOLD FURTHER DOSING OF Vancomycin 1500 mg IV q8h

   2. Follow up Random level on 07/14/20 at 0100 

   3. Pharmacy will continue to monitor, follow and adjust therapy as needed.

 

 CANDACE BELTRE [facility], 07/13/20 [id number]

## 2020-07-13 NOTE — NUR
SS following up with discharge planning. SS reviewed pt chart and discussed with pt RN. Pt 
remains on trach collar. Pt on TPN, IV Micafungin, IV Meropenem, and IV Cipro. All drains, 
chest tube, and J tube remain. PT recommended LTACH. Pt is self pay. Per RN, pt aspirated 
over the weekend. SS will continue to follow for discharge planning.

## 2020-07-13 NOTE — PDOC
PROGRESS NOTES


Chief Complaint


Chief Complaint


A/P


Acute hypoxic Respiratory failure required  mechanical ventilation


Tracheostomy


bilateral pleural effusions/pulm edema s/p Throacentesis on 6/15/2020


Severe Acute gallstone pancreatitis (not a surgical candidate at this time) with

necrosis


Acute kidney failure now requiring dialysis


Gallstones (Calculus of gallbladder with acute cholecystitis without 

obstruction)


HTN 


Intractable pain


Intractable nausea


Covid 19 negative. 


Acute on chronic anemia 


EEG: No seizure activityFever  - better currently - intermittent could be from 

underlying pancreatitis blood cults 5/4 - neg so far


? Ileus with vomiting


Abd distention - U/S and CT reviewed s/p 0.4 L of opaque, debris-containing 

ascites was removed 5/6


Acute pancreatitis with persistent necrosis


Gallstone pancreatitis with necrosis. 


   -CT A/P 6/6 showed multiple pseudocysts, slight larger on the right. s/p 

drains x 3, 6/7.  + PSAE (MDRO-R Cefepime, Zosyn ANSON < 64) and yeast, 


   -s/p drain 4/27. C. parapsilosis. s/p drain 5/6 + yeast & high amylase; s/p 

additional drain on 5/8. Drains removed. 


Ascites s/p paracentesis 4/15 & 5/6. C. parapsilosis 


JED. off HD. 


A large fluid collection in the pancreatic bed has slightly decreased in size, 

described below, the pancreas itself is difficult to  visualize, which could be 

due to necrosis or obscuration of pancreatic  parenchyma from the surrounding 

fluid collection.6/15 


- 4/27 status post ROBERT drain placement + C paropsilosis. s/p additional drains 

5/8


Anemia - S/p PRBCs


Cholelithiasis with thickening of the gallbladder wall.


Leucocytosis improving


JED, hyperkalemia, Metabolic acidosis off dialysis


hypocalcemia 


Prediabetes


HTN


s/p trach


ESRD on HD


Hyperglycemia


severe protein-caloric malnutrition


Moderate to large left pleural effusion with atelectasis and collapse  of most 

of the left lower lobe, stable


 


Dispo - ICU, critically ill


Poor prognosis





History of Present Illness


History of Present Illness


6/8: IR placed drain on 6/7. 4u PRBC after Hb drop. Hb 8.8 today. Off Levophed 

this morning. T-max 100.3. Much more lethargic today. CXR with left sided 

diffuse infiltrates.


6/9: Tachycardic overnight into the 140s. NGT clamped. On BIPAP currently. 

Drains with serosanguinous discharge. WBC 8, Tmax 99.6F.


6/10: Seen on trach shield in ICU.  Hypertensive and tachycardic. Labs stable. 

blood stained drainage from drains. Afebrile.


6/11: Seen on trach shield in ICU. She is a bit confused, drowsy, but when 

sitting up is conversational and confusion somewhat clears. She is asking for 

more pain medication. Stable drains, still very tachy.Na 147


6/12: Patient vomited overnight. Aspirated. Tried to pull her trach out, she was

told she would die without her trach, she said "I know, I just want to go home".

Hb 7.6. Afebrile, still very tachycardic. 1055ml out of right sided ROBERT drain


6/13: Overnight hypoxic, on BIPAP. CXR with left sided white out lung. 

Significant mucous plug suctioned by RT with improvement in her ABG after 2 

hours this morning. Not really active, tired, lethargic. 890ml out of drains 

past 24 hours. On vent. D/w daughter bedside.


6/29: To OR for pancreatic necrosectomy, cholecystectomy, lysis of adhesions, 

gastrostomy tube placement





7/7,  awake on vent, weaning sedation, cont other, still with marked drainage


7/8: Still on fentanyl. WBC 15.4, Hb 8.7 Metabolic panel WNL except calcium 10.2

with albumin 1.1. She awakens off sedation, still connected to vent currently.


7/9: FiO2 40% 5 PEEP, WBC 13.4, Hb 7.9, platelets 551.  Still on fentanyl, she 

is working with PT.


7/10: Off vent during the day. Working with PT. Labs stable. Hb to 8.5. Working 

with PT/OT. She has been suctioning quite a bit of respiratory secretions as 

well as loose bowels. C diff pending.


7/11: Afebrile, still with loose bowels overnight C. difficile negative.  WBC up

to 16 coughing up thick yellow sputum able to cough out her trach when taken off

trach shield she still has O2 saturations 97%.  Gastrostomy tube to gravity with

fairly significant drainage that appears to be tube feeds and gastric 

secretions.  She notes pain is better controlled with the fentanyl patch.  Blood

pressure elevated heart rate elevated is on metoprolol.


7/10: Overnight febrile 101.9F, coughing up more thick yellow and green 

secretions. FiO2 33% with 94% O2 saturations. Had x3 BM yesterday. Cultures sent

for sputum, fungal and blood cultures





Temp 100.4 F.  Chest x-ray with right basilar infiltrate looks like it is 

improving.  Still with some yellow sputum and suctioning.  She is sleepy and on 

the ventilator again.  Transitioning back to trach shield.  Still been working 

well with therapy





prognosis still poor, but improving slowly


Cont ICU





Vitals


Vitals





Vital Signs








  Date Time  Temp Pulse Resp B/P (MAP) Pulse Ox O2 Delivery O2 Flow Rate FiO2


 


7/13/20 10:01   28  40 Tracheal Collar  


 


7/13/20 06:00  116  148/74 (98)    


 


7/13/20 04:00 100.3       





 100.3       


 


7/12/20 14:00       9.0 











Physical Exam


Physical Exam


GENERAL: Propped up in bed, awake, weak appearing 


HEENT: Pupils equal, oral cavity dry. NGT out, on vent


NECK:  Tracheostomy 


LUNGS: Diminished aeration bases,  CT on left 


HEART:  S1, S2, regular, tachy 


ABDOMEN: Sightly less distended, bowel sounds hypoactive, soft, richardson x 2, 3 ROBERT

drains, G-J tube and + wound vac 


: [place] in place 


EXTREMITIES: Generalized edema, no cyanosis. SCDs & Podus boots bilaterally, 


SKIN: warm touch. No signs of rash.  


NEURO: awake, mouthing some words, tracking 


LUE-PICC without signs of complications 


LUE art-line out, mottling about old art-line site is improving. RP palpable, 

cap refill brisk.


General:  Alert, Cooperative, mild distress


Heart:  Regular rate (SR/ST), Other (distant heart sounds)


Lungs:  Crackles


Abdomen:  Soft, No tenderness, Other (drains in place with pancreatic necrosis)


Extremities:  Other (Diffuse edema)


Skin:  No rashes, No significant lesion





Labs


LABS





Laboratory Tests








Test


 7/12/20


12:38 7/12/20


17:58 7/13/20


00:21 7/13/20


05:35


 


Glucose (Fingerstick)


 302 mg/dL


(70-99) 106 mg/dL


(70-99) 124 mg/dL


(70-99) 





 


White Blood Count


 


 


 


 10.5 x10^3/uL


(4.0-11.0)


 


Red Blood Count


 


 


 


 2.48 x10^6/uL


(3.50-5.40)


 


Hemoglobin


 


 


 


 7.4 g/dL


(12.0-15.5)


 


Hematocrit


 


 


 


 21.7 %


(36.0-47.0)


 


Mean Corpuscular Volume    88 fL () 


 


Mean Corpuscular Hemoglobin    30 pg (25-35) 


 


Mean Corpuscular Hemoglobin


Concent 


 


 


 34 g/dL


(31-37)


 


Red Cell Distribution Width


 


 


 


 14.9 %


(11.5-14.5)


 


Platelet Count


 


 


 


 543 x10^3/uL


(140-400)


 


Neutrophils (%) (Auto)    83 % (31-73) 


 


Lymphocytes (%) (Auto)    8 % (24-48) 


 


Monocytes (%) (Auto)    8 % (0-9) 


 


Eosinophils (%) (Auto)    2 % (0-3) 


 


Basophils (%) (Auto)    0 % (0-3) 


 


Neutrophils # (Auto)


 


 


 


 8.7 x10^3/uL


(1.8-7.7)


 


Lymphocytes # (Auto)


 


 


 


 0.8 x10^3/uL


(1.0-4.8)


 


Monocytes # (Auto)


 


 


 


 0.8 x10^3/uL


(0.0-1.1)


 


Eosinophils # (Auto)


 


 


 


 0.2 x10^3/uL


(0.0-0.7)


 


Basophils # (Auto)


 


 


 


 0.0 x10^3/uL


(0.0-0.2)


 


Sodium Level


 


 


 


 137 mmol/L


(136-145)


 


Potassium Level


 


 


 


 4.3 mmol/L


(3.5-5.1)


 


Chloride Level


 


 


 


 104 mmol/L


()


 


Carbon Dioxide Level


 


 


 


 29 mmol/L


(21-32)


 


Anion Gap    4 (6-14) 


 


Blood Urea Nitrogen    9 mg/dL (7-20) 


 


Creatinine


 


 


 


 0.5 mg/dL


(0.6-1.0)


 


Estimated GFR


(Cockcroft-Gault) 


 


 


 131.1 





 


Glucose Level


 


 


 


 113 mg/dL


(70-99)


 


Calcium Level


 


 


 


 8.5 mg/dL


(8.5-10.1)


 


Phosphorus Level


 


 


 


 3.8 mg/dL


(2.6-4.7)


 


Magnesium Level


 


 


 


 2.1 mg/dL


(1.8-2.4)


 


Test


 7/13/20


05:37 


 


 





 


Glucose (Fingerstick)


 110 mg/dL


(70-99) 


 


 














Assessment and Plan


Assessmemt and Plan


Problems


Medical Problems:


(1) Acute pancreatitis


Status: Acute  





(2) Cholelithiasis


Status: Acute  











Comment


Review of Relevant


I have reviewed the following items josy (where applicable) has been applied.


Labs





Laboratory Tests








Test


 7/11/20


11:17 7/11/20


17:27 7/12/20


00:26 7/12/20


05:15


 


Glucose (Fingerstick)


 144 mg/dL


(70-99) 116 mg/dL


(70-99) 120 mg/dL


(70-99) 





 


White Blood Count


 


 


 


 18.5 x10^3/uL


(4.0-11.0)


 


Red Blood Count


 


 


 


 2.99 x10^6/uL


(3.50-5.40)


 


Hemoglobin


 


 


 


 8.8 g/dL


(12.0-15.5)


 


Hematocrit


 


 


 


 26.3 %


(36.0-47.0)


 


Mean Corpuscular Volume    88 fL () 


 


Mean Corpuscular Hemoglobin    29 pg (25-35) 


 


Mean Corpuscular Hemoglobin


Concent 


 


 


 33 g/dL


(31-37)


 


Red Cell Distribution Width


 


 


 


 15.0 %


(11.5-14.5)


 


Platelet Count


 


 


 


 693 x10^3/uL


(140-400)


 


Neutrophils (%) (Auto)    82 % (31-73) 


 


Lymphocytes (%) (Auto)    10 % (24-48) 


 


Monocytes (%) (Auto)    7 % (0-9) 


 


Eosinophils (%) (Auto)    1 % (0-3) 


 


Basophils (%) (Auto)    0 % (0-3) 


 


Neutrophils # (Auto)


 


 


 


 15.1 x10^3/uL


(1.8-7.7)


 


Lymphocytes # (Auto)


 


 


 


 1.9 x10^3/uL


(1.0-4.8)


 


Monocytes # (Auto)


 


 


 


 1.3 x10^3/uL


(0.0-1.1)


 


Eosinophils # (Auto)


 


 


 


 0.1 x10^3/uL


(0.0-0.7)


 


Basophils # (Auto)


 


 


 


 0.0 x10^3/uL


(0.0-0.2)


 


Sodium Level


 


 


 


 139 mmol/L


(136-145)


 


Potassium Level


 


 


 


 4.6 mmol/L


(3.5-5.1)


 


Chloride Level


 


 


 


 103 mmol/L


()


 


Carbon Dioxide Level


 


 


 


 30 mmol/L


(21-32)


 


Anion Gap    6 (6-14) 


 


Blood Urea Nitrogen


 


 


 


 11 mg/dL


(7-20)


 


Creatinine


 


 


 


 0.5 mg/dL


(0.6-1.0)


 


Estimated GFR


(Cockcroft-Gault) 


 


 


 131.1 





 


BUN/Creatinine Ratio    22 (6-20) 


 


Glucose Level


 


 


 


 122 mg/dL


(70-99)


 


Calcium Level


 


 


 


 9.3 mg/dL


(8.5-10.1)


 


Total Bilirubin


 


 


 


 0.5 mg/dL


(0.2-1.0)


 


Aspartate Amino Transf


(AST/SGOT) 


 


 


 23 U/L (15-37) 





 


Alanine Aminotransferase


(ALT/SGPT) 


 


 


 35 U/L (14-59) 





 


Alkaline Phosphatase


 


 


 


 156 U/L


()


 


Total Protein


 


 


 


 4.8 g/dL


(6.4-8.2)


 


Albumin


 


 


 


 1.5 g/dL


(3.4-5.0)


 


Albumin/Globulin Ratio    0.5 (1.0-1.7) 


 


Test


 7/12/20


12:38 7/12/20


17:58 7/13/20


00:21 7/13/20


05:35


 


Glucose (Fingerstick)


 302 mg/dL


(70-99) 106 mg/dL


(70-99) 124 mg/dL


(70-99) 





 


White Blood Count


 


 


 


 10.5 x10^3/uL


(4.0-11.0)


 


Red Blood Count


 


 


 


 2.48 x10^6/uL


(3.50-5.40)


 


Hemoglobin


 


 


 


 7.4 g/dL


(12.0-15.5)


 


Hematocrit


 


 


 


 21.7 %


(36.0-47.0)


 


Mean Corpuscular Volume    88 fL () 


 


Mean Corpuscular Hemoglobin    30 pg (25-35) 


 


Mean Corpuscular Hemoglobin


Concent 


 


 


 34 g/dL


(31-37)


 


Red Cell Distribution Width


 


 


 


 14.9 %


(11.5-14.5)


 


Platelet Count


 


 


 


 543 x10^3/uL


(140-400)


 


Neutrophils (%) (Auto)    83 % (31-73) 


 


Lymphocytes (%) (Auto)    8 % (24-48) 


 


Monocytes (%) (Auto)    8 % (0-9) 


 


Eosinophils (%) (Auto)    2 % (0-3) 


 


Basophils (%) (Auto)    0 % (0-3) 


 


Neutrophils # (Auto)


 


 


 


 8.7 x10^3/uL


(1.8-7.7)


 


Lymphocytes # (Auto)


 


 


 


 0.8 x10^3/uL


(1.0-4.8)


 


Monocytes # (Auto)


 


 


 


 0.8 x10^3/uL


(0.0-1.1)


 


Eosinophils # (Auto)


 


 


 


 0.2 x10^3/uL


(0.0-0.7)


 


Basophils # (Auto)


 


 


 


 0.0 x10^3/uL


(0.0-0.2)


 


Sodium Level


 


 


 


 137 mmol/L


(136-145)


 


Potassium Level


 


 


 


 4.3 mmol/L


(3.5-5.1)


 


Chloride Level


 


 


 


 104 mmol/L


()


 


Carbon Dioxide Level


 


 


 


 29 mmol/L


(21-32)


 


Anion Gap    4 (6-14) 


 


Blood Urea Nitrogen    9 mg/dL (7-20) 


 


Creatinine


 


 


 


 0.5 mg/dL


(0.6-1.0)


 


Estimated GFR


(Cockcroft-Gault) 


 


 


 131.1 





 


Glucose Level


 


 


 


 113 mg/dL


(70-99)


 


Calcium Level


 


 


 


 8.5 mg/dL


(8.5-10.1)


 


Phosphorus Level


 


 


 


 3.8 mg/dL


(2.6-4.7)


 


Magnesium Level


 


 


 


 2.1 mg/dL


(1.8-2.4)


 


Test


 7/13/20


05:37 


 


 





 


Glucose (Fingerstick)


 110 mg/dL


(70-99) 


 


 











Laboratory Tests








Test


 7/12/20


12:38 7/12/20


17:58 7/13/20


00:21 7/13/20


05:35


 


Glucose (Fingerstick)


 302 mg/dL


(70-99) 106 mg/dL


(70-99) 124 mg/dL


(70-99) 





 


White Blood Count


 


 


 


 10.5 x10^3/uL


(4.0-11.0)


 


Red Blood Count


 


 


 


 2.48 x10^6/uL


(3.50-5.40)


 


Hemoglobin


 


 


 


 7.4 g/dL


(12.0-15.5)


 


Hematocrit


 


 


 


 21.7 %


(36.0-47.0)


 


Mean Corpuscular Volume    88 fL () 


 


Mean Corpuscular Hemoglobin    30 pg (25-35) 


 


Mean Corpuscular Hemoglobin


Concent 


 


 


 34 g/dL


(31-37)


 


Red Cell Distribution Width


 


 


 


 14.9 %


(11.5-14.5)


 


Platelet Count


 


 


 


 543 x10^3/uL


(140-400)


 


Neutrophils (%) (Auto)    83 % (31-73) 


 


Lymphocytes (%) (Auto)    8 % (24-48) 


 


Monocytes (%) (Auto)    8 % (0-9) 


 


Eosinophils (%) (Auto)    2 % (0-3) 


 


Basophils (%) (Auto)    0 % (0-3) 


 


Neutrophils # (Auto)


 


 


 


 8.7 x10^3/uL


(1.8-7.7)


 


Lymphocytes # (Auto)


 


 


 


 0.8 x10^3/uL


(1.0-4.8)


 


Monocytes # (Auto)


 


 


 


 0.8 x10^3/uL


(0.0-1.1)


 


Eosinophils # (Auto)


 


 


 


 0.2 x10^3/uL


(0.0-0.7)


 


Basophils # (Auto)


 


 


 


 0.0 x10^3/uL


(0.0-0.2)


 


Sodium Level


 


 


 


 137 mmol/L


(136-145)


 


Potassium Level


 


 


 


 4.3 mmol/L


(3.5-5.1)


 


Chloride Level


 


 


 


 104 mmol/L


()


 


Carbon Dioxide Level


 


 


 


 29 mmol/L


(21-32)


 


Anion Gap    4 (6-14) 


 


Blood Urea Nitrogen    9 mg/dL (7-20) 


 


Creatinine


 


 


 


 0.5 mg/dL


(0.6-1.0)


 


Estimated GFR


(Cockcroft-Gault) 


 


 


 131.1 





 


Glucose Level


 


 


 


 113 mg/dL


(70-99)


 


Calcium Level


 


 


 


 8.5 mg/dL


(8.5-10.1)


 


Phosphorus Level


 


 


 


 3.8 mg/dL


(2.6-4.7)


 


Magnesium Level


 


 


 


 2.1 mg/dL


(1.8-2.4)


 


Test


 7/13/20


05:37 


 


 





 


Glucose (Fingerstick)


 110 mg/dL


(70-99) 


 


 











Microbiology


7/12/20 Blood Culture - Preliminary, Resulted


          NO GROWTH AFTER 1 DAY


6/30/20 Gram Stain - Final, Complete


          


6/30/20 Aerobic and Anaerobic Culture - Final, Complete


          


6/30/20 Antimicrobic Susceptibility - Final, Complete


          


6/15/20 Gram Stain - Final, Complete


          


6/15/20 Aerobic and Anaerobic Culture - Final, Complete


          


6/13/20 Gram Stain Evaluation - Final, Complete


          


6/13/20 Respiratory Culture - Final, Complete


          


6/13/20 Antimicrobic Susceptibility - Final, Complete


          


6/7/20 Urine Culture - Final, Complete


         


5/30/20 Gram Stain - Final, Complete


          


5/30/20 Aerobic Culture - Final, Complete


Medications





Current Medications


Sodium Chloride 1,000 ml @  1,000 mls/hr Q1H IV  Last administered on 3/16/20at 

03:00;  Start 3/16/20 at 03:00;  Stop 3/16/20 at 03:59;  Status DC


Ondansetron HCl (Zofran) 4 mg 1X  ONCE IVP  Last administered on 3/16/20at 

03:27;  Start 3/16/20 at 03:00;  Stop 3/16/20 at 03:01;  Status DC


Morphine Sulfate (Morphine Sulfate) 4 mg 1X  ONCE IV ;  Start 3/16/20 at 03:00; 

Stop 3/16/20 at 03:01;  Status Cancel


Ketorolac Tromethamine (Toradol 30mg Vial) 30 mg 1X  ONCE IV  Last administered 

on 3/16/20at 02:54;  Start 3/16/20 at 03:00;  Stop 3/16/20 at 03:01;  Status DC


Fentanyl Citrate (Fentanyl 2ml Vial) 25 mcg 1X  ONCE IVP  Last administered on 

3/16/20at 03:23;  Start 3/16/20 at 03:30;  Stop 3/16/20 at 03:31;  Status DC


Fentanyl Citrate (Fentanyl 2ml Vial) 100 mcg STK-MED ONCE .ROUTE ;  Start 

3/16/20 at 03:18;  Stop 3/16/20 at 03:18;  Status DC


Iohexol (Omnipaque 350 Mg/ml) 90 ml 1X  ONCE IV  Last administered on 3/16/20at 

03:25;  Start 3/16/20 at 03:30;  Stop 3/16/20 at 03:31;  Status DC


Info (CONTRAST GIVEN -- Rx MONITORING) 1 each PRN DAILY  PRN MC SEE COMMENTS;  

Start 3/16/20 at 03:30;  Stop 3/18/20 at 03:29;  Status DC


Hydromorphone HCl (Dilaudid) 0.5 mg 1X  ONCE IV  Last administered on 3/16/20at 

03:55;  Start 3/16/20 at 04:30;  Stop 3/16/20 at 04:32;  Status DC


Ondansetron HCl (Zofran) 4 mg PRN Q8HRS  PRN IV NAUSEA/VOMITING 1ST CHOICE;  

Start 3/16/20 at 05:00;  Stop 3/16/20 at 09:27;  Status DC


Morphine Sulfate (Morphine Sulfate) 2 mg PRN Q2HR  PRN IV SEVERE PAIN 7-10 Last 

administered on 3/17/20at 12:26;  Start 3/16/20 at 05:00;  Stop 3/17/20 at 

14:15;  Status DC


Sodium Chloride 1,000 ml @  125 mls/hr Q8H IV  Last administered on 3/16/20at 

20:56;  Start 3/16/20 at 05:00;  Stop 3/17/20 at 04:59;  Status DC


Hydromorphone HCl (Dilaudid) 0.5 mg PRN Q3HRS  PRN IV SEVERE PAIN 7-10 Last 

administered on 3/17/20at 10:06;  Start 3/16/20 at 05:00;  Stop 3/17/20 at 

12:01;  Status DC


Piperacillin Sod/ Tazobactam Sod 4.5 gm/Sodium Chloride 100 ml @  200 mls/hr 1X 

ONCE IV  Last administered on 3/16/20at 05:44;  Start 3/16/20 at 06:00;  Stop 

3/16/20 at 06:29;  Status DC


Ondansetron HCl (Zofran) 4 mg PRN Q4HRS  PRN IV NAUSEA/VOMITING 1ST CHOICE Last 

administered on 7/13/20at 04:30;  Start 3/16/20 at 09:30


Insulin Human Lispro (HumaLOG) 0-9 UNITS Q6HRS SQ  Last administered on 

7/12/20at 12:43;  Start 3/16/20 at 09:30


Dextrose (Dextrose 50%-Water Syringe) 12.5 gm PRN Q15MIN  PRN IV SEE COMMENTS;  

Start 3/16/20 at 09:30


Pantoprazole Sodium (PROTONIX VIAL for IV PUSH) 40 mg DAILYAC IVP  Last adminis

tered on 7/13/20at 09:13;  Start 3/16/20 at 11:30


Prochlorperazine Edisylate (Compazine) 10 mg PRN Q6HRS  PRN IV NAUSEA/VOMITING, 

2nd CHOICE Last administered on 7/12/20at 15:48;  Start 3/16/20 at 17:45


Atenolol (Tenormin) 100 mg DAILY PO ;  Start 3/17/20 at 09:00;  Stop 3/16/20 at 

20:08;  Status DC


Metoprolol Tartrate (Lopressor Vial) 2.5 mg Q6HRS IVP  Last administered on 

3/17/20at 05:51;  Start 3/16/20 at 20:15;  Stop 3/17/20 at 10:02;  Status DC


Metoprolol Tartrate (Lopressor Vial) 5 mg Q6HRS IVP  Last administered on 

3/26/20at 00:12;  Start 3/17/20 at 10:15;  Stop 3/28/20 at 08:48;  Status DC


Hydromorphone HCl (Dilaudid) 1 mg PRN Q3HRS  PRN IV SEVERE PAIN 7-10 Last 

administered on 3/23/20at 05:13;  Start 3/17/20 at 12:00;  Stop 3/31/20 at 

00:25;  Status DC


Lidocaine HCl (Buffered Lidocaine 1%) 3 ml STK-MED ONCE .ROUTE ;  Start 3/17/20 

at 12:55;  Stop 3/17/20 at 12:56;  Status DC


Albumin Human 500 ml @  125 mls/hr 1X  ONCE IV  Last administered on 3/17/20at 

14:33;  Start 3/17/20 at 14:30;  Stop 3/17/20 at 18:32;  Status DC


Norepinephrine Bitartrate 8 mg/ Dextrose 258 ml @  17.299 mls/ hr CONT  PRN IV 

PER PROTOCOL Last administered on 4/14/20at 12:48;  Start 3/17/20 at 15:30;  

Stop 4/17/20 at 09:19;  Status DC


Sodium Chloride 1,000 ml @  125 mls/hr Q8H IV  Last administered on 3/17/20at 

21:04;  Start 3/17/20 at 16:00;  Stop 3/18/20 at 02:42;  Status DC


Albumin Human 500 ml @  125 mls/hr PRN BID  PRN IV After every 2L NSS & BP < 

90mm Last administered on 6/30/20at 16:06;  Start 3/17/20 at 16:00;  Stop 7/3/20

at 09:30;  Status DC


Iohexol (Omnipaque 300 Mg/ml) 60 ml 1X  ONCE IV  Last administered on 3/17/20at 

17:20;  Start 3/17/20 at 17:00;  Stop 3/17/20 at 17:01;  Status DC


Info (CONTRAST GIVEN -- Rx MONITORING) 1 each PRN DAILY  PRN MC SEE COMMENTS;  

Start 3/17/20 at 17:00;  Stop 3/19/20 at 16:59;  Status DC


Meropenem 1 gm/ Sodium Chloride 100 ml @  200 mls/hr Q8HRS IV  Last administered

on 3/18/20at 05:45;  Start 3/17/20 at 20:00;  Stop 3/18/20 at 08:48;  Status DC


Furosemide (Lasix) 40 mg 1X  ONCE IVP  Last administered on 3/17/20at 22:12;  

Start 3/17/20 at 22:30;  Stop 3/17/20 at 22:31;  Status DC


Calcium Chloride 1000 mg/Sodium Chloride 110 ml @  220 mls/hr 1X  ONCE IV  Last 

administered on 3/17/20at 22:11;  Start 3/17/20 at 22:30;  Stop 3/17/20 at 

22:59;  Status DC


Albuterol Sulfate (Ventolin Neb Soln) 2.5 mg 1X  ONCE NEB  Last administered on 

3/18/20at 00:56;  Start 3/17/20 at 22:30;  Stop 3/17/20 at 22:31;  Status DC


Insulin Human Regular (HumuLIN R VIAL) 5 unit 1X  ONCE IV  Last administered on 

3/17/20at 22:14;  Start 3/17/20 at 22:30;  Stop 3/17/20 at 22:31;  Status DC


Magnesium Sulfate 50 ml @ 25 mls/hr 1X  ONCE IV  Last administered on 3/18/20at 

02:57;  Start 3/18/20 at 03:00;  Stop 3/18/20 at 04:59;  Status DC


Calcium Gluconate 1000 mg/Sodium Chloride 110 ml @  220 mls/hr 1X  ONCE IV  Last

administered on 3/18/20at 02:46;  Start 3/18/20 at 03:00;  Stop 3/18/20 at 

03:29;  Status DC


Sodium Chloride 1,000 ml @  200 mls/hr Q5H IV  Last administered on 3/18/20at 

02:46;  Start 3/18/20 at 03:00;  Stop 3/18/20 at 10:21;  Status DC


Calcium Gluconate 1000 mg/Sodium Chloride 110 ml @  220 mls/hr 1X  ONCE IV  Last

administered on 3/18/20at 03:21;  Start 3/18/20 at 03:30;  Stop 3/18/20 at 

03:59;  Status DC


Sodium Bicarbonate 50 meq/Sodium Chloride 1,050 ml @  75 mls/hr Q14H IV  Last 

administered on 3/22/20at 21:10;  Start 3/18/20 at 07:30;  Stop 3/23/20 at 

10:28;  Status DC


Calcium Gluconate 2000 mg/Sodium Chloride 120 ml @  220 mls/hr 1X  ONCE IV  Last

administered on 3/18/20at 09:05;  Start 3/18/20 at 07:30;  Stop 3/18/20 at 

08:02;  Status DC


Lidocaine HCl (Xylocaine-Mpf 1% 2ml Vial) 2 ml STK-MED ONCE .ROUTE ;  Start 

3/18/20 at 08:47;  Stop 3/18/20 at 08:47;  Status DC


Meropenem 500 mg/ Sodium Chloride 50 ml @  100 mls/hr Q12HR IV  Last 

administered on 3/23/20at 21:01;  Start 3/18/20 at 18:00;  Stop 3/24/20 at 

07:58;  Status DC


Lidocaine HCl (Buffered Lidocaine 1%) 3 ml STK-MED ONCE .ROUTE ;  Start 3/18/20 

at 09:46;  Stop 3/18/20 at 09:46;  Status DC


Lidocaine HCl (Buffered Lidocaine 1%) 6 ml 1X  ONCE INJ  Last administered on 

3/18/20at 10:26;  Start 3/18/20 at 10:15;  Stop 3/18/20 at 10:16;  Status DC


Info (Tpn Per Pharmacy) 1 each PRN DAILY  PRN MC SEE COMMENTS Last administered 

on 7/12/20at 09:52;  Start 3/18/20 at 12:00


Sodium Chloride 1,000 ml @  1,000 mls/hr Q1H PRN IV hypotension;  Start 3/18/20 

at 12:07;  Stop 3/18/20 at 18:06;  Status DC


Diphenhydramine HCl (Benadryl) 25 mg 1X PRN  PRN IV ITCHING;  Start 3/18/20 at 

12:15;  Stop 3/19/20 at 12:14;  Status DC


Diphenhydramine HCl (Benadryl) 25 mg 1X PRN  PRN IV ITCHING;  Start 3/18/20 at 

12:15;  Stop 3/19/20 at 12:14;  Status DC


Sodium Chloride 1,000 ml @  400 mls/hr Q2H30M PRN IV PATENCY;  Start 3/18/20 at 

12:07;  Stop 3/19/20 at 00:06;  Status DC


Info (PHARMACY MONITORING -- do not chart) 1 each PRN DAILY  PRN MC SEE 

COMMENTS;  Start 3/18/20 at 12:15;  Stop 3/20/20 at 08:13;  Status DC


Sodium Chloride 90 meq/Calcium Gluconate 10 meq/ Multivitamins 10 ml/Chromium/ 

Copper/Manganese/ Seleni/Zn 1 ml/ Total Parenteral Nutrition/Amino 

Acids/Dextrose/ Fat Emulsion Intravenous 55.005 ml  @ 2.292 mls/hr TPN  CONT IV 

;  Start 3/18/20 at 22:00;  Stop 3/18/20 at 12:33;  Status DC


Info (Tpn Per Pharmacy) 1 each PRN DAILY  PRN MC SEE COMMENTS;  Start 3/18/20 at

12:30;  Status UNV


Sodium Chloride 90 meq/Calcium Gluconate 10 meq/ Multivitamins 10 ml/Chromium/ 

Copper/Manganese/ Seleni/Zn 0.5 ml/ Total Parenteral Nutrition/Amino 

Acids/Dextrose/ Fat Emulsion Intravenous 1,512 ml @  63 mls/hr TPN  CONT IV  

Last administered on 3/18/20at 22:06;  Start 3/18/20 at 22:00;  Stop 3/19/20 at 

21:59;  Status DC


Calcium Carbonate/ Glycine (Tums) 500 mg PRN AFTMEALHC  PRN PO INDIGESTION;  

Start 3/18/20 at 17:45;  Stop 5/13/20 at 10:25;  Status DC


Calcium Gluconate (Calcium Gluconate) 2,000 mg 1X  ONCE IVP  Last administered 

on 3/19/20at 02:19;  Start 3/19/20 at 02:15;  Stop 3/19/20 at 02:16;  Status DC


Calcium Chloride 3000 mg/Sodium Chloride 1,030 ml @  50 mls/hr [id number] IV  Last 

administered on 3/21/20at 02:17;  Start 3/19/20 at 08:00;  Stop 3/21/20 at 

15:23;  Status DC


Lorazepam (Ativan Inj) 1 mg PRN Q4HRS  PRN IVP ANXIETY / AGITATION, 2nd choic 

Last administered on 4/17/20at 03:51;  Start 3/19/20 at 09:00;  Stop 4/17/20 at 

09:19;  Status DC


Sodium Chloride 1,000 ml @  1,000 mls/hr Q1H PRN IV hypotension;  Start 3/19/20 

at 08:56;  Stop 3/19/20 at 14:55;  Status DC


Albumin Human 200 ml @  200 mls/hr 1X PRN  PRN IV Hypotension;  Start 3/19/20 at

09:00;  Stop 3/19/20 at 14:59;  Status DC


Diphenhydramine HCl (Benadryl) 25 mg 1X PRN  PRN IV ITCHING;  Start 3/19/20 at 

09:00;  Stop 3/20/20 at 08:59;  Status DC


Diphenhydramine HCl (Benadryl) 25 mg 1X PRN  PRN IV ITCHING;  Start 3/19/20 at 

09:00;  Stop 3/20/20 at 08:59;  Status DC


Sodium Chloride 1,000 ml @  400 mls/hr Q2H30M PRN IV PATENCY;  Start 3/19/20 at 

08:56;  Stop 3/19/20 at 20:55;  Status DC


Info (PHARMACY MONITORING -- do not chart) 1 each PRN DAILY  PRN MC SEE 

COMMENTS;  Start 3/19/20 at 09:00;  Status UNV


Info (PHARMACY MONITORING -- do not chart) 1 each PRN DAILY  PRN MC SEE 

COMMENTS;  Start 3/19/20 at 09:00;  Stop 3/20/20 at 08:13;  Status DC


Digoxin (Lanoxin) 500 mcg 1X  ONCE IV  Last administered on 3/19/20at 10:04;  

Start 3/19/20 at 10:00;  Stop 3/19/20 at 10:01;  Status DC


Digoxin (Lanoxin) 125 mcg 1X  ONCE IV  Last administered on 3/19/20at 17:10;  

Start 3/19/20 at 18:00;  Stop 3/19/20 at 18:01;  Status DC


Magnesium Sulfate 100 ml @  25 mls/hr 1X  ONCE IV  Last administered on 

3/19/20at 12:48;  Start 3/19/20 at 13:00;  Stop 3/19/20 at 16:59;  Status DC


Sodium Chloride 90 meq/Magnesium Sulfate 10 meq/ Calcium Gluconate 20 meq/ 

Multivitamins 10 ml/Chromium/ Copper/Manganese/ Seleni/Zn 0.5 ml/ Total 

Parenteral Nutrition/Amino Acids/Dextrose/ Fat Emulsion Intravenous 1,512 ml @  

63 mls/hr TPN  CONT IV  Last administered on 3/19/20at 22:25;  Start 3/19/20 at 

22:00;  Stop 3/20/20 at 21:59;  Status DC


Sodium Chloride 1,000 ml @  1,000 mls/hr Q1H PRN IV hypotension;  Start 3/20/20 

at 08:05;  Stop 3/20/20 at 14:04;  Status DC


Albumin Human 200 ml @  200 mls/hr 1X  ONCE IV  Last administered on 3/20/20at 

08:57;  Start 3/20/20 at 08:15;  Stop 3/20/20 at 09:14;  Status DC


Diphenhydramine HCl (Benadryl) 25 mg 1X PRN  PRN IV ITCHING;  Start 3/20/20 at 

08:15;  Stop 3/21/20 at 08:14;  Status DC


Diphenhydramine HCl (Benadryl) 25 mg 1X PRN  PRN IV ITCHING;  Start 3/20/20 at 

08:15;  Stop 3/21/20 at 08:14;  Status DC


Sodium Chloride 1,000 ml @  400 mls/hr Q2H30M PRN IV PATENCY;  Start 3/20/20 at 

08:05;  Stop 3/20/20 at 20:04;  Status DC


Info (PHARMACY MONITORING -- do not chart) 1 each PRN DAILY  PRN MC SEE 

COMMENTS;  Start 3/20/20 at 08:15;  Stop 3/24/20 at 07:57;  Status DC


Sodium Chloride 90 meq/Potassium Chloride 15 meq/ Potassium Phosphate 10 mmol/ 

Magnesium Sulfate 10 meq/Calcium Gluconate 20 meq/ Multivitamins 10 ml/Chromium/

Copper/Manganese/ Seleni/Zn 0.5 ml/ Total Parenteral Nutrition/Amino 

Acids/Dextrose/ Fat Emulsion Intravenous 1,512 ml @  63 mls/hr TPN  CONT IV  

Last administered on 3/20/20at 21:01;  Start 3/20/20 at 22:00;  Stop 3/21/20 at 

21:59;  Status DC


Potassium Chloride/Water 100 ml @  100 mls/hr 1X  ONCE IV  Last administered on 

3/20/20at 14:09;  Start 3/20/20 at 14:00;  Stop 3/20/20 at 14:59;  Status DC


Benzocaine (Hurricaine One) 1 spray 1X  ONCE MM  Last administered on 3/20/20at 

16:38;  Start 3/20/20 at 14:30;  Stop 3/20/20 at 14:31;  Status DC


Lidocaine HCl (Glydo (Lidocaine) Jelly) 1 thomas 1X  ONCE MM  Last administered on 

3/20/20at 16:38;  Start 3/20/20 at 14:30;  Stop 3/20/20 at 14:31;  Status DC


Linezolid/Dextrose 300 ml @  300 mls/hr Q12HR IV  Last administered on 3/26/20at

21:04;  Start 3/20/20 at 20:00;  Stop 3/27/20 at 07:50;  Status DC


Acetaminophen (Tylenol) 650 mg PRN Q6HRS  PRN PO MILD PAIN / TEMP;  Start 

3/21/20 at 03:30;  Stop 3/21/20 at 03:36;  Status DC


Acetaminophen (Tylenol) 650 mg PRN Q6HRS  PRN PEG MILD PAIN / TEMP Last 

administered on 4/16/20at 19:56;  Start 3/21/20 at 03:36;  Stop 5/13/20 at 

10:25;  Status DC


Sodium Chloride 1,000 ml @  1,000 mls/hr Q1H PRN IV hypotension;  Start 3/21/20 

at 07:50;  Stop 3/21/20 at 13:49;  Status DC


Albumin Human 200 ml @  200 mls/hr 1X PRN  PRN IV Hypotension;  Start 3/21/20 at

08:00;  Stop 3/21/20 at 13:59;  Status DC


Sodium Chloride (Normal Saline Flush) 10 ml 1X PRN  PRN IV AP catheter pack;  

Start 3/21/20 at 08:00;  Stop 3/22/20 at 07:59;  Status DC


Sodium Chloride (Normal Saline Flush) 10 ml 1X PRN  PRN IV  catheter pack;  

Start 3/21/20 at 08:00;  Stop 3/22/20 at 07:59;  Status DC


Sodium Chloride 1,000 ml @  400 mls/hr Q2H30M PRN IV PATENCY;  Start 3/21/20 at 

07:50;  Stop 3/21/20 at 19:49;  Status DC


Info (PHARMACY MONITORING -- do not chart) 1 each PRN DAILY  PRN MC SEE 

COMMENTS;  Start 3/21/20 at 08:00;  Status UNV


Info (PHARMACY MONITORING -- do not chart) 1 each PRN DAILY  PRN MC SEE 

COMMENTS;  Start 3/21/20 at 08:00;  Stop 3/23/20 at 08:25;  Status DC


Sodium Chloride 90 meq/Potassium Chloride 15 meq/ Potassium Phosphate 10 mmol/ 

Magnesium Sulfate 10 meq/Calcium Gluconate 20 meq/ Multivitamins 10 ml/Chromium/

Copper/Manganese/ Seleni/Zn 0.5 ml/ Total Parenteral Nutrition/Amino 

Acids/Dextrose/ Fat Emulsion Intravenous 1,512 ml @  63 mls/hr TPN  CONT IV  

Last administered on 3/21/20at 20:57;  Start 3/21/20 at 22:00;  Stop 3/22/20 at 

21:59;  Status DC


Sodium Chloride 90 meq/Potassium Chloride 15 meq/ Potassium Phosphate 15 mmol/ 

Magnesium Sulfate 10 meq/Calcium Gluconate 20 meq/ Multivitamins 10 ml/Chromium/

Copper/Manganese/ Seleni/Zn 0.5 ml/ Total Parenteral Nutrition/Amino 

Acids/Dextrose/ Fat Emulsion Intravenous 1,512 ml @  63 mls/hr TPN  CONT IV ;  

Start 3/22/20 at 22:00;  Stop 3/22/20 at 14:16;  Status DC


Sodium Chloride 90 meq/Potassium Chloride 15 meq/ Potassium Phosphate 15 mmol/ M

agnesium Sulfate 10 meq/Calcium Gluconate 20 meq/ Multivitamins 10 ml/Chromium/ 

Copper/Manganese/ Seleni/Zn 0.5 ml/ Total Parenteral Nutrition/Amino 

Acids/Dextrose/ Fat Emulsion Intravenous 1,200 ml @  50 mls/hr TPN  CONT IV ;  

Start 3/22/20 at 22:00;  Stop 3/22/20 at 14:17;  Status DC


Sodium Chloride 90 meq/Potassium Chloride 15 meq/ Potassium Phosphate 10 mmol/ 

Magnesium Sulfate 10 meq/Calcium Gluconate 20 meq/ Multivitamins 10 ml/Chromium/

Copper/Manganese/ Seleni/Zn 0.5 ml/ Total Parenteral Nutrition/Amino 

Acids/Dextrose/ Fat Emulsion Intravenous 1,200 ml @  50 mls/hr TPN  CONT IV  

Last administered on 3/22/20at 23:29;  Start 3/22/20 at 22:00;  Stop 3/23/20 at 

21:59;  Status DC


Sodium Chloride 1,000 ml @  1,000 mls/hr Q1H PRN IV hypotension;  Start 3/23/20 

at 07:28;  Stop 3/23/20 at 13:27;  Status DC


Albumin Human 200 ml @  200 mls/hr 1X  ONCE IV  Last administered on 3/23/20at 0

8:51;  Start 3/23/20 at 07:30;  Stop 3/23/20 at 08:29;  Status DC


Diphenhydramine HCl (Benadryl) 25 mg 1X PRN  PRN IV ITCHING;  Start 3/23/20 at 

07:30;  Stop 3/24/20 at 07:29;  Status DC


Diphenhydramine HCl (Benadryl) 25 mg 1X PRN  PRN IV ITCHING;  Start 3/23/20 at 

07:30;  Stop 3/24/20 at 07:29;  Status DC


Sodium Chloride 1,000 ml @  400 mls/hr Q2H30M PRN IV PATENCY;  Start 3/23/20 at 

07:28;  Stop 3/23/20 at 19:27;  Status DC


Info (PHARMACY MONITORING -- do not chart) 1 each PRN DAILY  PRN MC SEE 

COMMENTS;  Start 3/23/20 at 07:30;  Stop 4/3/20 at 13:01;  Status DC


Metronidazole 100 ml @  100 mls/hr Q6HRS IV  Last administered on 4/8/20at 

06:26;  Start 3/23/20 at 08:30;  Stop 4/8/20 at 09:58;  Status DC


Micafungin Sodium 100 mg/Dextrose 100 ml @  100 mls/hr Q24H IV  Last 

administered on 4/30/20at 08:18;  Start 3/23/20 at 09:00;  Stop 4/30/20 at 

20:58;  Status DC


Propofol 0 ml @ As Directed STK-MED ONCE IV ;  Start 3/23/20 at 07:53;  Stop 

3/23/20 at 07:53;  Status DC


Etomidate (Amidate) 20 mg STK-MED ONCE IV ;  Start 3/23/20 at 07:53;  Stop 

3/23/20 at 07:54;  Status DC


Midazolam HCl (Versed) 5 mg STK-MED ONCE .ROUTE ;  Start 3/23/20 at 07:57;  Stop

3/23/20 at 07:57;  Status DC


Fentanyl Citrate 30 ml @ 0 mls/hr CONT  PRN IV SEE PROTOCOL Last administered on

4/17/20at 06:12;  Start 3/23/20 at 08:15;  Stop 4/17/20 at 09:19;  Status DC


Artificial Tears (Artificial Tears) 1 drop PRN Q1HR  PRN OU DRY EYE, 1st choice;

 Start 3/23/20 at 08:15;  Stop 4/29/20 at 05:31;  Status DC


Midazolam HCl 50 mg/Sodium Chloride 50 ml @ 0 mls/hr CONT  PRN IV SEE PROTOCOL 

Last administered on 3/26/20at 22:39;  Start 3/23/20 at 08:15;  Stop 3/28/20 at 

15:59;  Status DC


Etomidate (Amidate) 8 mg 1X  ONCE IV  Last administered on 3/23/20at 08:33;  

Start 3/23/20 at 08:30;  Stop 3/23/20 at 08:31;  Status DC


Succinylcholine Chloride (Anectine) 120 mg 1X  ONCE IV  Last administered on 

3/23/20at 08:34;  Start 3/23/20 at 08:30;  Stop 3/23/20 at 08:31;  Status DC


Midazolam HCl (Versed) 5 mg 1X  ONCE IV ;  Start 3/23/20 at 08:30;  Stop 3/23/20

at 08:31;  Status DC


Potassium Chloride 15 meq/ Bicarbonate Dialysis Soln w/ out KCl 5,007.5 ml  @ 

1,000 mls/ hr Q5H1M IV  Last administered on 3/24/20at 11:11;  Start 3/23/20 at 

12:00;  Stop 3/24/20 at 11:15;  Status DC


Potassium Chloride 15 meq/ Bicarbonate Dialysis Soln w/ out KCl 5,007.5 ml  @ 

1,000 mls/ hr Q5H1M IV  Last administered on 3/24/20at 11:12;  Start 3/23/20 at 

12:00;  Stop 3/24/20 at 11:17;  Status DC


Potassium Chloride 15 meq/ Bicarbonate Dialysis Soln w/ out KCl 5,007.5 ml  @ 

1,000 mls/ hr Q5H1M IV  Last administered on 3/24/20at 11:11;  Start 3/23/20 at 

12:00;  Stop 3/24/20 at 11:19;  Status DC


Sodium Chloride 90 meq/Potassium Chloride 15 meq/ Potassium Phosphate 10 mmol/ 

Magnesium Sulfate 10 meq/Calcium Gluconate 20 meq/ Multivitamins 10 ml/Chromium/

Copper/Manganese/ Seleni/Zn 0.5 ml/ Total Parenteral Nutrition/Amino 

Acids/Dextrose/ Fat Emulsion Intravenous 1,400 ml @  58.333 mls/ hr TPN  CONT IV

 Last administered on 3/23/20at 21:42;  Start 3/23/20 at 22:00;  Stop 3/24/20 at

21:59;  Status DC


Heparin Sodium (Porcine) (Heparin Sodium) 5,000 unit Q8HRS SQ  Last administered

on 3/28/20at 05:55;  Start 3/23/20 at 15:00;  Stop 3/28/20 at 13:28;  Status DC


Meropenem 500 mg/ Sodium Chloride 50 ml @  100 mls/hr Q6HRS IV  Last 

administered on 3/25/20at 06:00;  Start 3/24/20 at 09:00;  Stop 3/25/20 at 

07:29;  Status DC


Potassium Phosphate 20 mmol/ Sodium Chloride 106.6667 ml @  51.667 m... 1X  ONCE

IV  Last administered on 3/24/20at 11:22;  Start 3/24/20 at 10:15;  Stop 3/24/20

at 12:18;  Status DC


Acetaminophen (Tylenol Supp) 650 mg PRN Q6HRS  PRN DE MILD PAIN / TEMP > 100.3'F

Last administered on 7/12/20at 17:59;  Start 3/24/20 at 10:30


Potassium Chloride/Water 100 ml @  100 mls/hr Q1H IV  Last administered on 

3/24/20at 12:12;  Start 3/24/20 at 11:00;  Stop 3/24/20 at 12:59;  Status DC


Potassium Chloride 20 meq/ Bicarbonate Dialysis Soln w/ out KCl 5,010 ml @  

1,000 mls/hr Q5H1M IV  Last administered on 3/25/20at 08:48;  Start 3/24/20 at 

12:00;  Stop 3/25/20 at 13:03;  Status DC


Potassium Chloride 20 meq/ Bicarbonate Dialysis Soln w/ out KCl 5,010 ml @  

1,000 mls/hr Q5H1M IV  Last administered on 3/29/20at 14:52;  Start 3/24/20 at 

11:30;  Stop 3/29/20 at 19:59;  Status DC


Potassium Chloride 20 meq/ Bicarbonate Dialysis Soln w/ out KCl 5,010 ml @  

1,000 mls/hr Q5H1M IV  Last administered on 3/29/20at 14:53;  Start 3/24/20 at 

11:30;  Stop 3/29/20 at 19:59;  Status DC


Sodium Chloride 90 meq/Potassium Chloride 15 meq/ Potassium Phosphate 15 mmol/ 

Magnesium Sulfate 10 meq/Calcium Gluconate 15 meq/ Multivitamins 10 ml/Chromium/

Copper/Manganese/ Seleni/Zn 0.5 ml/ Total Parenteral Nutrition/Amino 

Acids/Dextrose/ Fat Emulsion Intravenous 1,400 ml @  58.333 mls/ hr TPN  CONT IV

 Last administered on 3/24/20at 22:17;  Start 3/24/20 at 22:00;  Stop 3/25/20 at

21:59;  Status DC


Cefepime HCl (Maxipime) 2 gm Q12HR IVP  Last administered on 4/7/20at 20:56;  

Start 3/25/20 at 09:00;  Stop 4/8/20 at 09:58;  Status DC


Daptomycin 500 mg/ Sodium Chloride 50 ml @  100 mls/hr Q48H IV  Last 

administered on 4/10/20at 09:57;  Start 3/25/20 at 08:30;  Stop 4/10/20 at 

10:07;  Status DC


Lidocaine HCl (Buffered Lidocaine 1%) 3 ml 1X  ONCE INJ  Last administered on 

3/25/20at 10:27;  Start 3/25/20 at 10:30;  Stop 3/25/20 at 10:31;  Status DC


Potassium Phosphate 20 mmol/ Sodium Chloride 106.6667 ml @  51.667 m... 1X  ONCE

IV  Last administered on 3/25/20at 12:51;  Start 3/25/20 at 13:00;  Stop 3/25/20

at 15:03;  Status DC


Sodium Chloride 90 meq/Potassium Chloride 15 meq/ Potassium Phosphate 18 mmol/ 

Magnesium Sulfate 8 meq/Calcium Gluconate 15 meq/ Multivitamins 10 ml/Chromium/ 

Copper/Manganese/ Seleni/Zn 0.5 ml/ Total Parenteral Nutrition/Amino 

Acids/Dextrose/ Fat Emulsion Intravenous 1,400 ml @  58.333 mls/ hr TPN  CONT IV

 Last administered on 3/25/20at 22:16;  Start 3/25/20 at 22:00;  Stop 3/26/20 at

21:59;  Status DC


Potassium Chloride 20 meq/ Bicarbonate Dialysis Soln w/ out KCl 5,010 ml @  

1,000 mls/hr Q5H1M IV  Last administered on 3/29/20at 14:54;  Start 3/25/20 at 

16:00;  Stop 3/29/20 at 19:59;  Status DC


Multi-Ingred Cream/Lotion/Oil/ Oint (Artificial Tears Eye Ointment) 1 thomas PRN 

Q1HR  PRN OU DRY EYE, 2nd choice Last administered on 4/13/20at 08:19;  Start 

3/25/20 at 17:30;  Stop 6/3/20 at 14:39;  Status DC


Sodium Chloride 90 meq/Potassium Chloride 15 meq/ Potassium Phosphate 18 mmol/ 

Magnesium Sulfate 8 meq/Calcium Gluconate 15 meq/ Multivitamins 10 ml/Chromium/ 

Copper/Manganese/ Seleni/Zn 0.5 ml/ Total Parenteral Nutrition/Amino 

Acids/Dextrose/ Fat Emulsion Intravenous 1,400 ml @  58.333 mls/ hr TPN  CONT IV

 Last administered on 3/26/20at 22:00;  Start 3/26/20 at 22:00;  Stop 3/27/20 at

21:59;  Status DC


Albumin Human 500 ml @  125 mls/hr 1X  ONCE IV ;  Start 3/26/20 at 14:15;  Stop 

3/26/20 at 18:14;  Status DC


Sodium Chloride 90 meq/Potassium Chloride 15 meq/ Potassium Phosphate 18 mmol/ 

Magnesium Sulfate 8 meq/Calcium Gluconate 15 meq/ Multivitamins 10 ml/Chromium/ 

Copper/Manganese/ Seleni/Zn 0.5 ml/ Insulin Human Regular 10 unit/ Total 

Parenteral Nutrition/Amino Acids/Dextrose/ Fat Emulsion Intravenous 1,400 ml @  

58.333 mls/ hr TPN  CONT IV  Last administered on 3/27/20at 21:43;  Start 

3/27/20 at 22:00;  Stop 3/28/20 at 21:59;  Status DC


Lidocaine HCl (Buffered Lidocaine 1%) 3 ml STK-MED ONCE .ROUTE ;  Start 3/25/20 

at 10:00;  Stop 3/27/20 at 13:57;  Status DC


Midazolam HCl 100 mg/Sodium Chloride 100 ml @ 7 mls/hr CONT  PRN IV SEE PROTOCOL

Last administered on 4/8/20at 15:35;  Start 3/28/20 at 16:00;  Stop 6/3/20 at 

14:38;  Status DC


Sodium Chloride 90 meq/Potassium Chloride 15 meq/ Potassium Phosphate 18 mmol/ 

Magnesium Sulfate 8 meq/Calcium Gluconate 15 meq/ Multivitamins 10 ml/Chromium/ 

Copper/Manganese/ Seleni/Zn 0.5 ml/ Insulin Human Regular 15 unit/ Total 

Parenteral Nutrition/Amino Acids/Dextrose/ Fat Emulsion Intravenous 1,400 ml @  

58.333 mls/ hr TPN  CONT IV  Last administered on 3/28/20at 20:34;  Start 

3/28/20 at 22:00;  Stop 3/29/20 at 21:59;  Status DC


Info ([facility] Electrolyte Protocol) 1 ea CONT PRN  PRN MC PER PROTOCOL;  Start 

3/29/20 at 13:15


Sodium Chloride 90 meq/Potassium Chloride 15 meq/ Potassium Phosphate 18 mmol/ 

Magnesium Sulfate 8 meq/Calcium Gluconate 15 meq/ Multivitamins 10 ml/Chromium/ 

Copper/Manganese/ Seleni/Zn 0.5 ml/ Insulin Human Regular 15 unit/ Total 

Parenteral Nutrition/Amino Acids/Dextrose/ Fat Emulsion Intravenous 1,400 ml @  

58.333 mls/ hr TPN  CONT IV  Last administered on 3/29/20at 22:05;  Start 

3/29/20 at 22:00;  Stop 3/30/20 at 21:59;  Status DC


Potassium Chloride 15 meq/ Bicarbonate Dialysis Soln w/ out KCl 5,007.5 ml  @ 

1,000 mls/ hr Q5H1M IV  Last administered on 4/1/20at 18:14;  Start 3/29/20 at 

20:00;  Stop 4/2/20 at 13:08;  Status DC


Potassium Chloride 15 meq/ Bicarbonate Dialysis Soln w/ out KCl 5,007.5 ml  @ 

1,000 mls/ hr Q5H1M IV  Last administered on 4/1/20at 18:14;  Start 3/29/20 at 

20:00;  Stop 4/2/20 at 13:08;  Status DC


Potassium Chloride 15 meq/ Bicarbonate Dialysis Soln w/ out KCl 5,007.5 ml  @ 

1,000 mls/ hr Q5H1M IV  Last administered on 4/1/20at 18:14;  Start 3/29/20 at 

20:00;  Stop 4/2/20 at 13:08;  Status DC


Iohexol (Omnipaque 240 Mg/ml) 30 ml 1X  ONCE PO  Last administered on 3/30/20at 

11:30;  Start 3/30/20 at 11:30;  Stop 3/30/20 at 11:33;  Status DC


Info (CONTRAST GIVEN -- Rx MONITORING) 1 each PRN DAILY  PRN MC SEE COMMENTS;  

Start 3/30/20 at 11:45;  Stop 4/1/20 at 11:44;  Status DC


Sodium Chloride 90 meq/Potassium Chloride 15 meq/ Potassium Phosphate 18 mmol/ 

Magnesium Sulfate 8 meq/Calcium Gluconate 15 meq/ Multivitamins 10 ml/Chromium/ 

Copper/Manganese/ Seleni/Zn 0.5 ml/ Insulin Human Regular 15 unit/ Total 

Parenteral Nutrition/Amino Acids/Dextrose/ Fat Emulsion Intravenous 1,400 ml @  

58.333 mls/ hr TPN  CONT IV  Last administered on 3/30/20at 21:47;  Start 

3/30/20 at 22:00;  Stop 3/31/20 at 21:59;  Status DC


Sodium Chloride 90 meq/Potassium Chloride 15 meq/ Potassium Phosphate 18 mmol/ 

Magnesium Sulfate 8 meq/Calcium Gluconate 15 meq/ Multivitamins 10 ml/Chromium/ 

Copper/Manganese/ Seleni/Zn 0.5 ml/ Insulin Human Regular 20 unit/ Total 

Parenteral Nutrition/Amino Acids/Dextrose/ Fat Emulsion Intravenous 1,400 ml @  

58.333 mls/ hr TPN  CONT IV  Last administered on 3/31/20at 21:36;  Start 

3/31/20 at 22:00;  Stop 4/1/20 at 21:59;  Status DC


Alteplase, Recombinant (Cathflo For Central Catheter Clearance) 1 mg 1X  ONCE 

INT CAT  Last administered on 3/31/20at 20:03;  Start 3/31/20 at 19:30;  Stop 

3/31/20 at 19:46;  Status DC


Alteplase, Recombinant (Cathflo For Central Catheter Clearance) 1 mg 1X  ONCE 

INT CAT  Last administered on 3/31/20at 22:05;  Start 3/31/20 at 22:00;  Stop 

3/31/20 at 22:01;  Status DC


Sodium Chloride 90 meq/Potassium Chloride 15 meq/ Potassium Phosphate 18 mmol/ 

Magnesium Sulfate 8 meq/Calcium Gluconate 15 meq/ Multivitamins 10 ml/Chromium/ 

Copper/Manganese/ Seleni/Zn 0.5 ml/ Insulin Human Regular 20 unit/ Total 

Parenteral Nutrition/Amino Acids/Dextrose/ Fat Emulsion Intravenous 1,400 ml @  

58.333 mls/ hr TPN  CONT IV  Last administered on 4/1/20at 21:30;  Start 4/1/20 

at 22:00;  Stop 4/2/20 at 21:59;  Status DC


Dexmedetomidine HCl 400 mcg/ Sodium Chloride 100 ml @ 0 mls/hr CONT  PRN IV 

ANXIETY / AGITATION Last administered on 5/30/20at 12:57;  Start 4/2/20 at 08:

15;  Stop 5/30/20 at 18:31;  Status DC


Sodium Chloride 500 ml @  500 mls/hr 1X PRN  PRN IV ELEVATED BP, SEE COMMENTS;  

Start 4/2/20 at 08:15


Atropine Sulfate (ATROPINE 0.5mg SYRINGE) 0.5 mg PRN Q5MIN  PRN IV SEE COMMENTS;

 Start 4/2/20 at 08:15


Furosemide (Lasix) 20 mg 1X  ONCE IVP  Last administered on 4/2/20at 08:19;  

Start 4/2/20 at 08:15;  Stop 4/2/20 at 08:16;  Status DC


Lidocaine HCl (Buffered Lidocaine 1%) 3 ml STK-MED ONCE .ROUTE ;  Start 4/2/20 

at 08:39;  Stop 4/2/20 at 08:39;  Status DC


Lidocaine HCl (Buffered Lidocaine 1%) 6 ml 1X  ONCE INJ  Last administered on 

4/2/20at 09:05;  Start 4/2/20 at 09:00;  Stop 4/2/20 at 09:06;  Status DC


Sodium Chloride 90 meq/Potassium Chloride 15 meq/ Potassium Phosphate 18 mmol/ 

Magnesium Sulfate 8 meq/Calcium Gluconate 15 meq/ Multivitamins 10 ml/Chromium/ 

Copper/Manganese/ Seleni/Zn 0.5 ml/ Insulin Human Regular 20 unit/ Total 

Parenteral Nutrition/Amino Acids/Dextrose/ Fat Emulsion Intravenous 1,400 ml @  

58.333 mls/ hr TPN  CONT IV  Last administered on 4/2/20at 22:45;  Start 4/2/20 

at 22:00;  Stop 4/3/20 at 21:59;  Status DC


Sodium Chloride 1,000 ml @  1,000 mls/hr Q1H PRN IV hypotension;  Start 4/3/20 

at 07:30;  Stop 4/3/20 at 13:29;  Status DC


Albumin Human 200 ml @  200 mls/hr 1X PRN  PRN IV Hypotension Last administered 

on 4/3/20at 09:36;  Start 4/3/20 at 07:30;  Stop 4/3/20 at 13:29;  Status DC


Sodium Chloride (Normal Saline Flush) 10 ml 1X PRN  PRN IV AP catheter pack;  

Start 4/3/20 at 07:30;  Stop 4/3/20 at 21:29;  Status DC


Sodium Chloride (Normal Saline Flush) 10 ml 1X PRN  PRN IV  catheter pack;  

Start 4/3/20 at 07:30;  Stop 4/4/20 at 07:29;  Status DC


Sodium Chloride 1,000 ml @  400 mls/hr Q2H30M PRN IV PATENCY;  Start 4/3/20 at 

07:30;  Stop 4/3/20 at 19:29;  Status DC


Info (PHARMACY MONITORING -- do not chart) 1 each PRN DAILY  PRN MC SEE 

COMMENTS;  Start 4/3/20 at 07:30;  Stop 4/3/20 at 13:02;  Status DC


Info (PHARMACY MONITORING -- do not chart) 1 each PRN DAILY  PRN MC SEE 

COMMENTS;  Start 4/3/20 at 07:30;  Stop 4/5/20 at 12:45;  Status DC


Sodium Chloride 90 meq/Potassium Chloride 15 meq/ Potassium Phosphate 10 mmol/ 

Magnesium Sulfate 8 meq/Calcium Gluconate 15 meq/ Multivitamins 10 ml/Chromium/ 

Copper/Manganese/ Seleni/Zn 0.5 ml/ Insulin Human Regular 25 unit/ Total 

Parenteral Nutrition/Amino Acids/Dextrose/ Fat Emulsion Intravenous 1,400 ml @  

58.333 mls/ hr TPN  CONT IV  Last administered on 4/3/20at 22:19;  Start 4/3/20 

at 22:00;  Stop 4/4/20 at 21:59;  Status DC


Heparin Sodium (Porcine) (Heparin Sodium) 5,000 unit Q12HR SQ  Last administered

on 4/26/20at 08:59;  Start 4/3/20 at 21:00;  Stop 4/26/20 at 10:05;  Status DC


Ondansetron HCl (Zofran) 4 mg PRN Q6HRS  PRN IV NAUSEA/VOMITING;  Start 4/6/20 

at 07:00;  Stop 4/7/20 at 06:59;  Status DC


Fentanyl Citrate (Fentanyl 2ml Vial) 25 mcg PRN Q5MIN  PRN IV MILD PAIN 1-3;  

Start 4/6/20 at 07:00;  Stop 4/7/20 at 06:59;  Status DC


Fentanyl Citrate (Fentanyl 2ml Vial) 50 mcg PRN Q5MIN  PRN IV MODERATE TO SEVERE

PAIN;  Start 4/6/20 at 07:00;  Stop 4/7/20 at 06:59;  Status DC


Ringer's Solution 1,000 ml @  30 mls/hr Q24H IV ;  Start 4/6/20 at 07:00;  Stop 

4/6/20 at 18:59;  Status DC


Lidocaine HCl (Xylocaine-Mpf 1% 2ml Vial) 2 ml PRN 1X  PRN ID PRIOR TO IV START;

 Start 4/6/20 at 07:00;  Stop 4/7/20 at 06:59;  Status DC


Prochlorperazine Edisylate (Compazine) 5 mg PACU PRN  PRN IV NAUSEA, MRX1;  

Start 4/6/20 at 07:00;  Stop 4/7/20 at 06:59;  Status DC


Sodium Chloride 1,000 ml @  1,000 mls/hr Q1H PRN IV hypotension;  Start 4/4/20 

at 09:10;  Stop 4/4/20 at 15:09;  Status DC


Albumin Human 200 ml @  200 mls/hr 1X PRN  PRN IV Hypotension Last administered 

on 4/4/20at 10:10;  Start 4/4/20 at 09:15;  Stop 4/4/20 at 15:14;  Status DC


Sodium Chloride 1,000 ml @  400 mls/hr Q2H30M PRN IV PATENCY;  Start 4/4/20 at 

09:10;  Stop 4/4/20 at 21:09;  Status DC


Info (PHARMACY MONITORING -- do not chart) 1 each PRN DAILY  PRN MC SEE 

COMMENTS;  Start 4/4/20 at 09:15;  Stop 4/5/20 at 12:45;  Status DC


Info (PHARMACY MONITORING -- do not chart) 1 each PRN DAILY  PRN MC SEE 

COMMENTS;  Start 4/4/20 at 09:15;  Stop 4/5/20 at 12:45;  Status DC


Sodium Chloride 90 meq/Potassium Chloride 15 meq/ Potassium Phosphate 10 mmol/ 

Magnesium Sulfate 8 meq/Calcium Gluconate 15 meq/ Multivitamins 10 ml/Chromium/ 

Copper/Manganese/ Seleni/Zn 0.5 ml/ Insulin Human Regular 25 unit/ Total 

Parenteral Nutrition/Amino Acids/Dextrose/ Fat Emulsion Intravenous 1,400 ml @  

58.333 mls/ hr TPN  CONT IV  Last administered on 4/4/20at 22:10;  Start 4/4/20 

at 22:00;  Stop 4/5/20 at 21:59;  Status DC


Magnesium Sulfate 50 ml @ 25 mls/hr PRN DAILY  PRN IV for Mag < 1.7 on am labs 

Last administered on 6/18/20at 10:57;  Start 4/5/20 at 09:15


Sodium Chloride 90 meq/Potassium Chloride 15 meq/ Potassium Phosphate 10 mmol/ 

Magnesium Sulfate 8 meq/Calcium Gluconate 15 meq/ Multivitamins 10 ml/Chromium/ 

Copper/Manganese/ Seleni/Zn 0.5 ml/ Insulin Human Regular 25 unit/ Total 

Parenteral Nutrition/Amino Acids/Dextrose/ Fat Emulsion Intravenous 1,400 ml @  

58.333 mls/ hr TPN  CONT IV  Last administered on 4/5/20at 21:20;  Start 4/5/20 

at 22:00;  Stop 4/6/20 at 21:59;  Status DC


Sodium Chloride 1,000 ml @  1,000 mls/hr Q1H PRN IV hypotension;  Start 4/5/20 

at 12:23;  Stop 4/5/20 at 18:22;  Status DC


Albumin Human 200 ml @  200 mls/hr 1X  ONCE IV  Last administered on 4/5/20at 

13:34;  Start 4/5/20 at 12:30;  Stop 4/5/20 at 13:29;  Status DC


Diphenhydramine HCl (Benadryl) 25 mg 1X PRN  PRN IV ITCHING;  Start 4/5/20 at 

12:30;  Stop 4/6/20 at 12:29;  Status DC


Diphenhydramine HCl (Benadryl) 25 mg 1X PRN  PRN IV ITCHING;  Start 4/5/20 at 

12:30;  Stop 4/6/20 at 12:29;  Status DC


Info (PHARMACY MONITORING -- do not chart) 1 each PRN DAILY  PRN MC SEE 

COMMENTS;  Start 4/5/20 at 12:30;  Status Cancel


Bupivacaine HCl/ Epinephrine Bitart (Sensorcain-Epi 0.5%-1:038720 Mpf) 30 ml 

STK-MED ONCE .ROUTE  Last administered on 4/6/20at 11:44;  Start 4/6/20 at 

11:00;  Stop 4/6/20 at 11:01;  Status DC


Cellulose (Surgicel Fibrillar 1x2) 1 each STK-MED ONCE .ROUTE ;  Start 4/6/20 at

11:00;  Stop 4/6/20 at 11:01;  Status DC


Sodium Chloride 90 meq/Potassium Chloride 15 meq/ Potassium Phosphate 10 mmol/ 

Magnesium Sulfate 12 meq/Calcium Gluconate 15 meq/ Multivitamins 10 ml/Chromium/

Copper/Manganese/ Seleni/Zn 0.5 ml/ Insulin Human Regular 25 unit/ Total 

Parenteral Nutrition/Amino Acids/Dextrose/ Fat Emulsion Intravenous 1,400 ml @  

58.333 mls/ hr TPN  CONT IV  Last administered on 4/6/20at 22:24;  Start 4/6/20 

at 22:00;  Stop 4/7/20 at 21:59;  Status DC


Propofol 20 ml @ As Directed STK-MED ONCE IV ;  Start 4/6/20 at 11:07;  Stop 

4/6/20 at 11:07;  Status DC


Cellulose (Surgicel Hemostat 4x8) 1 each STK-MED ONCE .ROUTE  Last administered 

on 4/6/20at 11:44;  Start 4/6/20 at 11:55;  Stop 4/6/20 at 11:56;  Status DC


Sevoflurane (Ultane) 60 ml STK-MED ONCE IH ;  Start 4/6/20 at 12:46;  Stop 

4/6/20 at 12:46;  Status DC


Sodium Chloride 1,000 ml @  1,000 mls/hr Q1H PRN IV hypotension;  Start 4/6/20 

at 13:51;  Stop 4/6/20 at 19:50;  Status DC


Albumin Human 200 ml @  200 mls/hr 1X PRN  PRN IV Hypotension Last administered 

on 4/6/20at 14:51;  Start 4/6/20 at 14:00;  Stop 4/6/20 at 19:59;  Status DC


Diphenhydramine HCl (Benadryl) 25 mg 1X PRN  PRN IV ITCHING;  Start 4/6/20 at 

14:00;  Stop 4/7/20 at 13:59;  Status DC


Diphenhydramine HCl (Benadryl) 25 mg 1X PRN  PRN IV ITCHING;  Start 4/6/20 at 

14:00;  Stop 4/7/20 at 13:59;  Status DC


Sodium Chloride 1,000 ml @  400 mls/hr Q2H30M PRN IV PATENCY;  Start 4/6/20 at 

13:51;  Stop 4/7/20 at 01:50;  Status DC


Info (PHARMACY MONITORING -- do not chart) 1 each PRN DAILY  PRN MC SEE 

COMMENTS;  Start 4/6/20 at 14:00;  Stop 4/9/20 at 08:16;  Status DC


Heparin Sodium (Porcine) (Hep Lock Adult) 500 unit STK-MED ONCE IVP ;  Start 

4/7/20 at 09:29;  Stop 4/7/20 at 09:30;  Status DC


Sodium Chloride 1,000 ml @  1,000 mls/hr Q1H PRN IV hypotension;  Start 4/7/20 

at 10:43;  Stop 4/7/20 at 16:42;  Status DC


Sodium Chloride 1,000 ml @  400 mls/hr Q2H30M PRN IV PATENCY;  Start 4/7/20 at 

10:43;  Stop 4/7/20 at 22:42;  Status DC


Info (PHARMACY MONITORING -- do not chart) 1 each PRN DAILY  PRN MC SEE 

COMMENTS;  Start 4/7/20 at 10:45;  Status UNV


Info (PHARMACY MONITORING -- do not chart) 1 each PRN DAILY  PRN MC SEE 

COMMENTS;  Start 4/7/20 at 10:45;  Status UNV


Sodium Chloride 90 meq/Potassium Chloride 15 meq/ Magnesium Sulfate 12 

meq/Calcium Gluconate 15 meq/ Multivitamins 10 ml/Chromium/ Copper/Manganese/ 

Seleni/Zn 0.5 ml/ Insulin Human Regular 25 unit/ Total Parenteral 

Nutrition/Amino Acids/Dextrose/ Fat Emulsion Intravenous 1,400 ml @  58.333 mls/

hr TPN  CONT IV  Last administered on 4/7/20at 22:13;  Start 4/7/20 at 22:00;  

Stop 4/8/20 at 21:59;  Status DC


Sodium Chloride 1,000 ml @  1,000 mls/hr Q1H PRN IV hypotension;  Start 4/8/20 

at 07:50;  Stop 4/8/20 at 13:49;  Status DC


Albumin Human 200 ml @  200 mls/hr 1X  ONCE IV ;  Start 4/8/20 at 08:00;  Stop 

4/8/20 at 08:53;  Status DC


Diphenhydramine HCl (Benadryl) 25 mg 1X PRN  PRN IV ITCHING;  Start 4/8/20 at 

08:00;  Stop 4/9/20 at 07:59;  Status DC


Diphenhydramine HCl (Benadryl) 25 mg 1X PRN  PRN IV ITCHING;  Start 4/8/20 at 

08:00;  Stop 4/9/20 at 07:59;  Status DC


Info (PHARMACY MONITORING -- do not chart) 1 each PRN DAILY  PRN MC SEE 

COMMENTS;  Start 4/8/20 at 08:00;  Stop 4/9/20 at 08:16;  Status DC


Albumin Human 50 ml @ 50 mls/hr 1X  ONCE IV ;  Start 4/8/20 at 08:53;  Stop 

4/8/20 at 08:56;  Status DC


Albumin Human 200 ml @  50 mls/hr PRN 1X  PRN IV HYPOTENSION Last administered 

on 4/14/20at 11:54;  Start 4/8/20 at 09:00;  Stop 5/21/20 at 11:14;  Status DC


Meropenem 500 mg/ Sodium Chloride 50 ml @  100 mls/hr Q12H IV  Last administered

on 4/28/20at 10:45;  Start 4/8/20 at 10:00;  Stop 4/28/20 at 12:37;  Status DC


Sodium Chloride 90 meq/Magnesium Sulfate 12 meq/ Calcium Gluconate 15 meq/ 

Multivitamins 10 ml/Chromium/ Copper/Manganese/ Seleni/Zn 0.5 ml/ Insulin Human 

Regular 25 unit/ Total Parenteral Nutrition/Amino Acids/Dextrose/ Fat Emulsion 

Intravenous 1,400 ml @  58.333 mls/ hr TPN  CONT IV  Last administered on 

4/8/20at 21:41;  Start 4/8/20 at 22:00;  Stop 4/9/20 at 21:59;  Status DC


Sodium Chloride 1,000 ml @  1,000 mls/hr Q1H PRN IV hypotension;  Start 4/9/20 

at 07:58;  Stop 4/9/20 at 13:57;  Status DC


Albumin Human 200 ml @  200 mls/hr 1X PRN  PRN IV Hypotension Last administered 

on 4/9/20at 09:30;  Start 4/9/20 at 08:00;  Stop 4/9/20 at 13:59;  Status DC


Sodium Chloride 1,000 ml @  400 mls/hr Q2H30M PRN IV PATENCY;  Start 4/9/20 at 

07:58;  Stop 4/9/20 at 19:57;  Status DC


Info (PHARMACY MONITORING -- do not chart) 1 each PRN DAILY  PRN MC SEE 

COMMENTS;  Start 4/9/20 at 08:00;  Status Cancel


Info (PHARMACY MONITORING -- do not chart) 1 each PRN DAILY  PRN MC SEE 

COMMENTS;  Start 4/9/20 at 08:15;  Status UNV


Sodium Chloride 90 meq/Potassium Phosphate 5 mmol/ Magnesium Sulfate 12 

meq/Calcium Gluconate 15 meq/ Multivitamins 10 ml/Chromium/ Copper/Manganese/ 

Seleni/Zn 0.5 ml/ Insulin Human Regular 30 unit/ Total Parenteral 

Nutrition/Amino Acids/Dextrose/ Fat Emulsion Intravenous 1,400 ml @  58.333 mls/

hr TPN  CONT IV  Last administered on 4/9/20at 22:08;  Start 4/9/20 at 22:00;  

Stop 4/10/20 at 21:59;  Status DC


Linezolid/Dextrose 300 ml @  300 mls/hr Q12HR IV  Last administered on 4/20/20at

20:40;  Start 4/10/20 at 11:00;  Stop 4/21/20 at 08:10;  Status DC


Sodium Chloride 90 meq/Potassium Phosphate 15 mmol/ Magnesium Sulfate 12 

meq/Calcium Gluconate 15 meq/ Multivitamins 10 ml/Chromium/ Copper/Manganese/ 

Seleni/Zn 0.5 ml/ Insulin Human Regular 30 unit/ Total Parenteral 

Nutrition/Amino Acids/Dextrose/ Fat Emulsion Intravenous 1,400 ml @  58.333 mls/

hr TPN  CONT IV  Last administered on 4/10/20at 21:49;  Start 4/10/20 at 22:00; 

Stop 4/11/20 at 21:59;  Status DC


Sodium Chloride 90 meq/Potassium Phosphate 15 mmol/ Magnesium Sulfate 12 

meq/Calcium Gluconate 15 meq/ Multivitamins 10 ml/Chromium/ Copper/Manganese/ 

Seleni/Zn 0.5 ml/ Insulin Human Regular 40 unit/ Total Parenteral 

Nutrition/Amino Acids/Dextrose/ Fat Emulsion Intravenous 1,400 ml @  58.333 mls/

hr TPN  CONT IV  Last administered on 4/11/20at 21:21;  Start 4/11/20 at 22:00; 

Stop 4/12/20 at 21:59;  Status DC


Sodium Chloride 1,000 ml @  1,000 mls/hr Q1H PRN IV hypotension;  Start 4/11/20 

at 13:26;  Stop 4/11/20 at 19:25;  Status DC


Albumin Human 200 ml @  200 mls/hr 1X PRN  PRN IV Hypotension Last administered 

on 4/11/20at 15:00;  Start 4/11/20 at 13:30;  Stop 4/11/20 at 19:29;  Status DC


Sodium Chloride (Normal Saline Flush) 10 ml 1X PRN  PRN IV AP catheter pack;  

Start 4/11/20 at 13:30;  Stop 4/12/20 at 13:29;  Status DC


Sodium Chloride (Normal Saline Flush) 10 ml 1X PRN  PRN IV  catheter pack;  

Start 4/11/20 at 13:30;  Stop 4/12/20 at 13:29;  Status DC


Sodium Chloride 1,000 ml @  400 mls/hr Q2H30M PRN IV PATENCY;  Start 4/11/20 at 

13:26;  Stop 4/12/20 at 01:25;  Status DC


Info (PHARMACY MONITORING -- do not chart) 1 each PRN DAILY  PRN MC SEE 

COMMENTS;  Start 4/11/20 at 13:30;  Stop 4/11/20 at 13:33;  Status DC


Info (PHARMACY MONITORING -- do not chart) 1 each PRN DAILY  PRN MC SEE 

COMMENTS;  Start 4/11/20 at 13:30;  Stop 4/11/20 at 13:34;  Status DC


Sodium Chloride 90 meq/Potassium Phosphate 19 mmol/ Magnesium Sulfate 12 

meq/Calcium Gluconate 15 meq/ Multivitamins 10 ml/Chromium/ Copper/Manganese/ 

Seleni/Zn 0.5 ml/ Insulin Human Regular 40 unit/ Total Parenteral 

Nutrition/Amino Acids/Dextrose/ Fat Emulsion Intravenous 1,400 ml @  58.333 mls/

hr TPN  CONT IV  Last administered on 4/12/20at 21:54;  Start 4/12/20 at 22:00; 

Stop 4/13/20 at 21:59;  Status DC


Sodium Chloride 1,000 ml @  1,000 mls/hr Q1H PRN IV hypotension;  Start 4/13/20 

at 09:35;  Stop 4/13/20 at 15:34;  Status DC


Albumin Human 200 ml @  200 mls/hr 1X PRN  PRN IV Hypotension;  Start 4/13/20 at

09:45;  Stop 4/13/20 at 15:44;  Status DC


Diphenhydramine HCl (Benadryl) 25 mg 1X PRN  PRN IV ITCHING;  Start 4/13/20 at 

09:45;  Stop 4/14/20 at 09:44;  Status DC


Diphenhydramine HCl (Benadryl) 25 mg 1X PRN  PRN IV ITCHING;  Start 4/13/20 at 

09:45;  Stop 4/14/20 at 09:44;  Status DC


Sodium Chloride 1,000 ml @  400 mls/hr Q2H30M PRN IV PATENCY;  Start 4/13/20 at 

09:35;  Stop 4/13/20 at 21:34;  Status DC


Info (PHARMACY MONITORING -- do not chart) 1 each PRN DAILY  PRN MC SEE 

COMMENTS;  Start 4/13/20 at 09:45;  Status Cancel


Sodium Chloride 100 meq/Potassium Phosphate 19 mmol/ Magnesium Sulfate 12 

meq/Calcium Gluconate 15 meq/ Multivitamins 10 ml/Chromium/ Copper/Manganese/ 

Seleni/Zn 0.5 ml/ Insulin Human Regular 40 unit/ Potassium Chloride 20 meq/ 

Total Parenteral Nutrition/Amino Acids/Dextrose/ Fat Emulsion Intravenous 1,400 

ml @  58.333 mls/ hr TPN  CONT IV  Last administered on 4/13/20at 22:02;  Start 

4/13/20 at 22:00;  Stop 4/14/20 at 21:59;  Status DC


Furosemide (Lasix) 40 mg 1X  ONCE IVP  Last administered on 4/13/20at 14:39;  

Start 4/13/20 at 14:30;  Stop 4/13/20 at 14:31;  Status DC


Metronidazole 100 ml @  100 mls/hr Q8HRS IV  Last administered on 4/21/20at 0

6:04;  Start 4/14/20 at 10:00;  Stop 4/21/20 at 08:10;  Status DC


Sodium Chloride 1,000 ml @  1,000 mls/hr Q1H PRN IV hypotension;  Start 4/14/20 

at 08:00;  Stop 4/14/20 at 13:59;  Status DC


Albumin Human 200 ml @  200 mls/hr 1X PRN  PRN IV Hypotension;  Start 4/14/20 at

08:00;  Stop 4/14/20 at 13:59;  Status DC


Sodium Chloride 1,000 ml @  400 mls/hr Q2H30M PRN IV PATENCY;  Start 4/14/20 at 

08:00;  Stop 4/14/20 at 19:59;  Status DC


Info (PHARMACY MONITORING -- do not chart) 1 each PRN DAILY  PRN MC SEE COMM

ENTS;  Start 4/14/20 at 11:30;  Status UNV


Info (PHARMACY MONITORING -- do not chart) 1 each PRN DAILY  PRN MC SEE 

COMMENTS;  Start 4/14/20 at 11:30;  Stop 4/16/20 at 12:13;  Status DC


Sodium Chloride 100 meq/Potassium Phosphate 19 mmol/ Magnesium Sulfate 12 

meq/Calcium Gluconate 15 meq/ Multivitamins 10 ml/Chromium/ Copper/Manganese/ 

Seleni/Zn 0.5 ml/ Insulin Human Regular 40 unit/ Potassium Chloride 20 meq/ 

Total Parenteral Nutrition/Amino Acids/Dextrose/ Fat Emulsion Intravenous 1,400 

ml @  58.333 mls/ hr TPN  CONT IV  Last administered on 4/14/20at 21:52;  Start 

4/14/20 at 22:00;  Stop 4/15/20 at 21:59;  Status DC


Sodium Chloride (Normal Saline Flush) 10 ml QSHIFT  PRN IV AFTER MEDS AND BLOOD 

DRAWS;  Start 4/14/20 at 15:00;  Stop 5/12/20 at 11:27;  Status DC


Sodium Chloride (Normal Saline Flush) 10 ml PRN Q5MIN  PRN IV AFTER MEDS AND 

BLOOD DRAWS;  Start 4/14/20 at 15:00


Sodium Chloride (Normal Saline Flush) 20 ml PRN Q5MIN  PRN IV AFTER MEDS AND 

BLOOD DRAWS;  Start 4/14/20 at 15:00


Sodium Chloride 100 meq/Potassium Phosphate 19 mmol/ Magnesium Sulfate 12 

meq/Calcium Gluconate 15 meq/ Multivitamins 10 ml/Chromium/ Copper/Manganese/ 

Seleni/Zn 0.5 ml/ Insulin Human Regular 40 unit/ Potassium Chloride 20 meq/ 

Total Parenteral Nutrition/Amino Acids/Dextrose/ Fat Emulsion Intravenous 1,400 

ml @  58.333 mls/ hr TPN  CONT IV  Last administered on 4/15/20at 21:20;  Start 

4/15/20 at 22:00;  Stop 4/16/20 at 21:59;  Status DC


Lidocaine HCl (Buffered Lidocaine 1%) 3 ml STK-MED ONCE .ROUTE ;  Start 4/15/20 

at 13:16;  Stop 4/15/20 at 13:16;  Status DC


Lidocaine HCl (Buffered Lidocaine 1%) 6 ml 1X  ONCE INJ  Last administered on 

4/15/20at 13:45;  Start 4/15/20 at 13:30;  Stop 4/15/20 at 13:31;  Status DC


Albumin Human 100 ml @  100 mls/hr 1X  ONCE IV  Last administered on 4/15/20at 

15:41;  Start 4/15/20 at 15:00;  Stop 4/15/20 at 15:59;  Status DC


Albumin Human 50 ml @ 50 mls/hr 1X  ONCE IV  Last administered on 4/15/20at 

15:00;  Start 4/15/20 at 15:00;  Stop 4/15/20 at 15:59;  Status DC


Info (PHARMACY MONITORING -- do not chart) 1 each PRN DAILY  PRN MC SEE 

COMMENTS;  Start 4/16/20 at 11:30;  Status Cancel


Info (PHARMACY MONITORING -- do not chart) 1 each PRN DAILY  PRN MC SEE 

COMMENTS;  Start 4/16/20 at 11:30;  Status UNV


Sodium Chloride 100 meq/Potassium Phosphate 10 mmol/ Magnesium Sulfate 12 

meq/Calcium Gluconate 15 meq/ Multivitamins 10 ml/Chromium/ Copper/Manganese/ 

Seleni/Zn 0.5 ml/ Insulin Human Regular 35 unit/ Potassium Chloride 20 meq/ 

Total Parenteral Nutrition/Amino Acids/Dextrose/ Fat Emulsion Intravenous 1,400 

ml @  58.333 mls/ hr TPN  CONT IV  Last administered on 4/16/20at 22:10;  Start 

4/16/20 at 22:00;  Stop 4/17/20 at 21:59;  Status DC


Sodium Chloride 100 meq/Potassium Phosphate 5 mmol/ Magnesium Sulfate 12 

meq/Calcium Gluconate 15 meq/ Multivitamins 10 ml/Chromium/ Copper/Manganese/ 

Seleni/Zn 0.5 ml/ Insulin Human Regular 35 unit/ Potassium Chloride 20 meq/ 

Total Parenteral Nutrition/Amino Acids/Dextrose/ Fat Emulsion Intravenous 1,400 

ml @  58.333 mls/ hr TPN  CONT IV  Last administered on 4/17/20at 22:59;  Start 

4/17/20 at 22:00;  Stop 4/18/20 at 21:59;  Status DC


Sodium Chloride 1,000 ml @  1,000 mls/hr Q1H PRN IV hypotension;  Start 4/18/20 

at 08:27;  Stop 4/18/20 at 14:26;  Status DC


Albumin Human 200 ml @  200 mls/hr 1X PRN  PRN IV Hypotension Last administered 

on 4/18/20at 09:18;  Start 4/18/20 at 08:30;  Stop 4/18/20 at 14:29;  Status DC


Sodium Chloride 1,000 ml @  400 mls/hr Q2H30M PRN IV PATENCY;  Start 4/18/20 at 

08:27;  Stop 4/18/20 at 20:26;  Status DC


Info (PHARMACY MONITORING -- do not chart) 1 each PRN DAILY  PRN MC SEE 

COMMENTS;  Start 4/18/20 at 08:30;  Status Cancel


Info (PHARMACY MONITORING -- do not chart) 1 each PRN DAILY  PRN MC SEE 

COMMENTS;  Start 4/18/20 at 08:30;  Stop 4/26/20 at 13:10;  Status DC


Sodium Chloride 100 meq/Potassium Chloride 40 meq/ Magnesium Sulfate 15 

meq/Calcium Gluconate 15 meq/ Multivitamins 10 ml/Chromium/ Copper/Manganese/ 

Seleni/Zn 0.5 ml/ Insulin Human Regular 35 unit/ Total Parenteral 

Nutrition/Amino Acids/Dextrose/ Fat Emulsion Intravenous 1,400 ml @  58.333 mls/

hr TPN  CONT IV  Last administered on 4/18/20at 22:00;  Start 4/18/20 at 22:00; 

Stop 4/19/20 at 21:59;  Status DC


Potassium Chloride/Water 100 ml @  100 mls/hr 1X  ONCE IV  Last administered on 

4/18/20at 17:28;  Start 4/18/20 at 14:45;  Stop 4/18/20 at 15:44;  Status DC


Sodium Chloride 100 meq/Potassium Chloride 40 meq/ Magnesium Sulfate 15 

meq/Calcium Gluconate 15 meq/ Multivitamins 10 ml/Chromium/ Copper/Manganese/ 

Seleni/Zn 0.5 ml/ Insulin Human Regular 35 unit/ Total Parenteral 

Nutrition/Amino Acids/Dextrose/ Fat Emulsion Intravenous 1,400 ml @  58.333 mls/

hr TPN  CONT IV  Last administered on 4/19/20at 22:46;  Start 4/19/20 at 22:00; 

Stop 4/20/20 at 21:59;  Status DC


Sodium Chloride 100 meq/Potassium Chloride 40 meq/ Magnesium Sulfate 20 meq/[place]

cium Gluconate 15 meq/ Multivitamins 10 ml/Chromium/ Copper/Manganese/ Seleni/Zn

0.5 ml/ Insulin Human Regular 35 unit/ Total Parenteral Nutrition/Amino 

Acids/Dextrose/ Fat Emulsion Intravenous 1,400 ml @  58.333 mls/ hr TPN  CONT IV

 Last administered on 4/20/20at 22:31;  Start 4/20/20 at 22:00;  Stop 4/21/20 at

21:59;  Status DC


Fentanyl Citrate (Fentanyl 2ml Vial) 50 mcg PRN Q2HR  PRN IVP PAIN Last 

administered on 4/27/20at 13:32;  Start 4/20/20 at 21:00;  Stop 4/28/20 at 

12:53;  Status DC


Fentanyl Citrate (Fentanyl 2ml Vial) 25 mcg PRN Q2HR  PRN IVP PAIN;  Start 

4/20/20 at 21:00;  Stop 4/28/20 at 12:54;  Status DC


Enoxaparin Sodium (Lovenox 100mg Syringe) 100 mg Q12HR SQ ;  Start 4/21/20 at 

21:00;  Status UNV


Amino Acids/ Glycerin/ Electrolytes 1,000 ml @  75 mls/hr [id number] IV ;  Start 

4/20/20 at 21:15;  Status UNV


Sodium Chloride 1,000 ml @  1,000 mls/hr Q1H PRN IV hypotension;  Start 4/21/20 

at 07:56;  Stop 4/21/20 at 13:55;  Status DC


Albumin Human 200 ml @  200 mls/hr 1X PRN  PRN IV Hypotension Last administered 

on 4/21/20at 08:40;  Start 4/21/20 at 08:00;  Stop 4/21/20 at 13:59;  Status DC


Sodium Chloride 1,000 ml @  400 mls/hr Q2H30M PRN IV PATENCY;  Start 4/21/20 at 

07:56;  Stop 4/21/20 at 19:55;  Status DC


Info (PHARMACY MONITORING -- do not chart) 1 each PRN DAILY  PRN MC SEE 

COMMENTS;  Start 4/21/20 at 08:00;  Status UNV


Info (PHARMACY MONITORING -- do not chart) 1 each PRN DAILY  PRN MC SEE 

COMMENTS;  Start 4/21/20 at 08:00;  Status UNV


Daptomycin 430 mg/ Sodium Chloride 50 ml @  100 mls/hr Q24H IV  Last 

administered on 4/21/20at 12:35;  Start 4/21/20 at 09:00;  Stop 4/21/20 at 

12:49;  Status DC


Sodium Chloride 100 meq/Potassium Chloride 40 meq/ Magnesium Sulfate 20 

meq/Calcium Gluconate 15 meq/ Multivitamins 10 ml/Chromium/ Copper/Manganese/ 

Seleni/Zn 0.5 ml/ Insulin Human Regular 35 unit/ Total Parenteral 

Nutrition/Amino Acids/Dextrose/ Fat Emulsion Intravenous 1,400 ml @  58.333 mls/

hr TPN  CONT IV  Last administered on 4/21/20at 21:26;  Start 4/21/20 at 22:00; 

Stop 4/22/20 at 21:59;  Status DC


Daptomycin 430 mg/ Sodium Chloride 50 ml @  100 mls/hr Q48H IV ;  Start 4/23/20 

at 09:00;  Stop 4/22/20 at 11:55;  Status DC


Sodium Chloride 100 meq/Potassium Chloride 40 meq/ Magnesium Sulfate 20 

meq/Calcium Gluconate 15 meq/ Multivitamins 10 ml/Chromium/ Copper/Manganese/ 

Seleni/Zn 0.5 ml/ Insulin Human Regular 35 unit/ Total Parenteral 

Nutrition/Amino Acids/Dextrose/ Fat Emulsion Intravenous 1,400 ml @  58.333 mls/

hr TPN  CONT IV  Last administered on 4/22/20at 22:27;  Start 4/22/20 at 22:00; 

Stop 4/23/20 at 21:59;  Status DC


Daptomycin 430 mg/ Sodium Chloride 50 ml @  100 mls/hr Q24H IV  Last 

administered on 4/24/20at 15:07;  Start 4/22/20 at 13:00;  Stop 4/25/20 at 

13:15;  Status DC


Sodium Chloride 100 meq/Potassium Chloride 40 meq/ Magnesium Sulfate 20 

meq/Calcium Gluconate 10 meq/ Multivitamins 10 ml/Chromium/ Copper/Manganese/ 

Seleni/Zn 0.5 ml/ Insulin Human Regular 35 unit/ Total Parenteral 

Nutrition/Amino Acids/Dextrose/ Fat Emulsion Intravenous 1,400 ml @  58.333 mls/

hr TPN  CONT IV  Last administered on 4/24/20at 00:06;  Start 4/23/20 at 22:00; 

Stop 4/24/20 at 21:59;  Status DC


Alteplase, Recombinant (Cathflo For Central Catheter Clearance) 1 mg 1X  ONCE 

INT CAT  Last administered on 4/24/20at 11:44;  Start 4/24/20 at 10:45;  Stop 

4/24/20 at 10:46;  Status DC


Ondansetron HCl (Zofran) 4 mg PRN Q6HRS  PRN IV NAUSEA/VOMITING;  Start 4/27/20 

at 07:00;  Stop 4/28/20 at 06:59;  Status DC


Fentanyl Citrate (Fentanyl 2ml Vial) 25 mcg PRN Q5MIN  PRN IV MILD PAIN 1-3;  

Start 4/27/20 at 07:00;  Stop 4/28/20 at 06:59;  Status DC


Fentanyl Citrate (Fentanyl 2ml Vial) 50 mcg PRN Q5MIN  PRN IV MODERATE TO SEVERE

PAIN Last administered on 4/27/20at 10:17;  Start 4/27/20 at 07:00;  Stop 

4/28/20 at 06:59;  Status DC


Ringer's Solution 1,000 ml @  30 mls/hr Q24H IV ;  Start 4/27/20 at 07:00;  Stop

4/27/20 at 18:59;  Status DC


Lidocaine HCl (Xylocaine-Mpf 1% 2ml Vial) 2 ml PRN 1X  PRN ID PRIOR TO IV START;

 Start 4/27/20 at 07:00;  Stop 4/28/20 at 06:59;  Status DC


Prochlorperazine Edisylate (Compazine) 5 mg PACU PRN  PRN IV NAUSEA, MRX1;  

Start 4/27/20 at 07:00;  Stop 4/28/20 at 06:59;  Status DC


Sodium Acetate 50 meq/Potassium Acetate 55 meq/ Magnesium Sulfate 20 meq/Calcium

Gluconate 10 meq/ Multivitamins 10 ml/Chromium/ Copper/Manganese/ Seleni/Zn 0.5 

ml/ Insulin Human Regular 35 unit/ Total Parenteral Nutrition/Amino 

Acids/Dextrose/ Fat Emulsion Intravenous 1,400 ml @  58.333 mls/ hr TPN  CONT IV

;  Start 4/24/20 at 22:00;  Stop 4/24/20 at 14:15;  Status DC


Sodium Acetate 50 meq/Potassium Acetate 55 meq/ Magnesium Sulfate 20 meq/Calcium

Gluconate 10 meq/ Multivitamins 10 ml/Chromium/ Copper/Manganese/ Seleni/Zn 0.5 

ml/ Insulin Human Regular 35 unit/ Total Parenteral Nutrition/Amino 

Acids/Dextrose/ Fat Emulsion Intravenous 1,800 ml @  75 mls/hr TPN  CONT IV  

Last administered on 4/24/20at 22:38;  Start 4/24/20 at 22:00;  Stop 4/25/20 at 

21:59;  Status DC


Sodium Chloride 1,000 ml @  1,000 mls/hr Q1H PRN IV hypotension;  Start 4/24/20 

at 15:31;  Stop 4/24/20 at 21:30;  Status DC


Diphenhydramine HCl (Benadryl) 25 mg 1X PRN  PRN IV ITCHING;  Start 4/24/20 at 

15:45;  Stop 4/25/20 at 15:44;  Status DC


Diphenhydramine HCl (Benadryl) 25 mg 1X PRN  PRN IV ITCHING;  Start 4/24/20 at 

15:45;  Stop 4/25/20 at 15:44;  Status DC


Sodium Chloride 1,000 ml @  400 mls/hr Q2H30M PRN IV PATENCY;  Start 4/24/20 at 

15:31;  Stop 4/25/20 at 03:30;  Status DC


Info (PHARMACY MONITORING -- do not chart) 1 each PRN DAILY  PRN MC SEE 

COMMENTS;  Start 4/24/20 at 15:45;  Stop 5/26/20 at 14:14;  Status DC


Sodium Acetate 50 meq/Potassium Acetate 55 meq/ Magnesium Sulfate 20 meq/Calcium

Gluconate 10 meq/ Multivitamins 10 ml/Chromium/ Copper/Manganese/ Seleni/Zn 0.5 

ml/ Insulin Human Regular 35 unit/ Total Parenteral Nutrition/Amino 

Acids/Dextrose/ Fat Emulsion Intravenous 1,800 ml @  75 mls/hr TPN  CONT IV  

Last administered on 4/25/20at 22:03;  Start 4/25/20 at 22:00;  Stop 4/26/20 at 

21:59;  Status DC


Daptomycin 430 mg/ Sodium Chloride 50 ml @  100 mls/hr Q24H IV  Last 

administered on 4/30/20at 13:00;  Start 4/25/20 at 13:00;  Stop 4/30/20 at 

20:58;  Status DC


Heparin Sodium (Porcine) 1000 unit/Sodium Chloride 1,001 ml @  1,001 mls/hr 1X  

ONCE IRR ;  Start 4/27/20 at 06:00;  Stop 4/27/20 at 06:59;  Status DC


Potassium Acetate 55 meq/Magnesium Sulfate 20 meq/ Calcium Gluconate 10 meq/ 

Multivitamins 10 ml/Chromium/ Copper/Manganese/ Seleni/Zn 0.5 ml/ Insulin Human 

Regular 35 unit/ Total Parenteral Nutrition/Amino Acids/Dextrose/ Fat Emulsion 

Intravenous 1,920 ml @  80 mls/hr TPN  CONT IV  Last administered on 4/26/20at 

22:10;  Start 4/26/20 at 22:00;  Stop 4/27/20 at 21:59;  Status DC


Dexamethasone Sodium Phosphate (Decadron) 4 mg STK-MED ONCE .ROUTE ;  Start 

4/27/20 at 10:56;  Stop 4/27/20 at 10:57;  Status DC


Ondansetron HCl (Zofran) 4 mg STK-MED ONCE .ROUTE ;  Start 4/27/20 at 10:56;  

Stop 4/27/20 at 10:57;  Status DC


Rocuronium Bromide (Zemuron) 50 mg STK-MED ONCE .ROUTE ;  Start 4/27/20 at 

10:56;  Stop 4/27/20 at 10:57;  Status DC


Fentanyl Citrate (Fentanyl 2ml Vial) 100 mcg STK-MED ONCE .ROUTE ;  Start 

4/27/20 at 10:56;  Stop 4/27/20 at 10:57;  Status DC


Bupivacaine HCl/ Epinephrine Bitart (Sensorcain-Epi 0.5%-1:638852 Mpf) 30 ml 

STK-MED ONCE .ROUTE  Last administered on 4/27/20at 12:01;  Start 4/27/20 at 

10:58;  Stop 4/27/20 at 10:58;  Status DC


Cellulose (Surgicel Hemostat 2x14) 1 each STK-MED ONCE .ROUTE ;  Start 4/27/20 

at 10:58;  Stop 4/27/20 at 10:59;  Status DC


Iohexol (Omnipaque 300 Mg/ml) 50 ml STK-MED ONCE .ROUTE ;  Start 4/27/20 at 

10:58;  Stop 4/27/20 at 10:59;  Status DC


Cellulose (Surgicel Hemostat 4x8) 1 each STK-MED ONCE .ROUTE ;  Start 4/27/20 at

10:58;  Stop 4/27/20 at 10:59;  Status DC


Bisacodyl (Dulcolax Supp) 10 mg STK-MED ONCE .ROUTE ;  Start 4/27/20 at 10:59;  

Stop 4/27/20 at 10:59;  Status DC


Heparin Sodium (Porcine) 1000 unit/Sodium Chloride 1,001 ml @  1,001 mls/hr 1X  

ONCE IRR ;  Start 4/27/20 at 12:00;  Stop 4/27/20 at 12:59;  Status DC


Propofol 20 ml @ As Directed STK-MED ONCE IV ;  Start 4/27/20 at 11:05;  Stop 

4/27/20 at 11:05;  Status DC


Sevoflurane (Ultane) 90 ml STK-MED ONCE IH ;  Start 4/27/20 at 11:05;  Stop 

4/27/20 at 11:05;  Status DC


Sevoflurane (Ultane) 60 ml STK-MED ONCE IH ;  Start 4/27/20 at 12:26;  Stop 

4/27/20 at 12:27;  Status DC


Propofol 20 ml @ As Directed STK-MED ONCE IV ;  Start 4/27/20 at 12:26;  Stop 

4/27/20 at 12:27;  Status DC


Phenylephrine HCl (PHENYLEPHRINE in 0.9% NACL PF) 1 mg STK-MED ONCE IV ;  Start 

4/27/20 at 12:34;  Stop 4/27/20 at 12:34;  Status DC


Heparin Sodium (Porcine) (Heparin Sodium) 5,000 unit Q12HR SQ  Last administered

on 5/6/20at 20:57;  Start 4/27/20 at 21:00;  Stop 5/7/20 at 09:59;  Status DC


Sodium Chloride (Normal Saline Flush) 3 ml QSHIFT  PRN IV AFTER MEDS AND BLOOD 

DRAWS;  Start 4/27/20 at 13:45;  Status Cancel


Naloxone HCl (Narcan) 0.4 mg PRN Q2MIN  PRN IV SEE INSTRUCTIONS Last 

administered on 6/6/20at 15:15;  Start 4/27/20 at 13:45;  Stop 7/1/20 at 16:00; 

Status DC


Sodium Chloride 1,000 ml @  25 mls/hr Q24H IV  Last administered on 5/26/20at 

13:37;  Start 4/27/20 at 13:37;  Stop 5/29/20 at 13:09;  Status DC


Naloxone HCl (Narcan) 0.4 mg PRN Q2MIN  PRN IV SEE INSTRUCTIONS;  Start 4/27/20 

at 14:30;  Status UNV


Sodium Chloride 1,000 ml @  25 mls/hr Q24H IV ;  Start 4/27/20 at 14:30;  Status

UNV


Hydromorphone HCl 30 ml @ 0 mls/hr CONT PRN  PRN IV PER PROTOCOL Last 

administered on 5/2/20at 16:08;  Start 4/27/20 at 14:30;  Stop 5/4/20 at 08:55; 

Status DC


Potassium Acetate 55 meq/Magnesium Sulfate 20 meq/ Calcium Gluconate 10 meq/ 

Multivitamins 10 ml/Chromium/ Copper/Manganese/ Seleni/Zn 0.5 ml/ Insulin Human 

Regular 35 unit/ Total Parenteral Nutrition/Amino Acids/Dextrose/ Fat Emulsion 

Intravenous 1,920 ml @  80 mls/hr TPN  CONT IV  Last administered on 4/27/20at 

22:01;  Start 4/27/20 at 22:00;  Stop 4/28/20 at 21:59;  Status DC


Bumetanide (Bumex) 2 mg BID92 IV  Last administered on 5/1/20at 13:50;  Start 

4/28/20 at 14:00;  Stop 5/2/20 at 14:10;  Status DC


Meropenem 1 gm/ Sodium Chloride 100 ml @  200 mls/hr Q8HRS IV  Last administered

on 5/22/20at 05:53;  Start 4/28/20 at 14:00;  Stop 5/22/20 at 09:31;  Status DC


Potassium Acetate 55 meq/Magnesium Sulfate 20 meq/ Calcium Gluconate 10 meq/ 

Multivitamins 10 ml/Chromium/ Copper/Manganese/ Seleni/Zn 0.5 ml/ Insulin Human 

Regular 35 unit/ Total Parenteral Nutrition/Amino Acids/Dextrose/ Fat Emulsion 

Intravenous 1,920 ml @  80 mls/hr TPN  CONT IV  Last administered on 4/28/20at 

22:02;  Start 4/28/20 at 22:00;  Stop 4/29/20 at 21:59;  Status DC


Hydromorphone HCl (Dilaudid Standard PCA) 12 mg STK-MED ONCE IV ;  Start 4/27/20

at 14:35;  Stop 4/28/20 at 13:53;  Status DC


Artificial Tears (Artificial Tears) 1 drop PRN Q15MIN  PRN OU DRY EYE Last 

administered on 6/23/20at 21:17;  Start 4/29/20 at 05:30


Hydromorphone HCl (Dilaudid Standard PCA) 12 mg STK-MED ONCE IV ;  Start 4/28/20

at 12:05;  Stop 4/29/20 at 09:15;  Status DC


Potassium Acetate 65 meq/Magnesium Sulfate 20 meq/ Calcium Gluconate 10 meq/ Mu

ltivitamins 10 ml/Chromium/ Copper/Manganese/ Seleni/Zn 0.5 ml/ Insulin Human 

Regular 30 unit/ Total Parenteral Nutrition/Amino Acids/Dextrose/ Fat Emulsion 

Intravenous 1,920 ml @  80 mls/hr TPN  CONT IV  Last administered on 4/29/20at 

22:22;  Start 4/29/20 at 22:00;  Stop 4/30/20 at 21:59;  Status DC


Cyclobenzaprine HCl (Flexeril) 10 mg PRN Q6HRS  PRN PO MUSCLE SPASMS Last 

administered on 7/10/20at 19:12;  Start 4/30/20 at 10:45


Potassium Acetate 55 meq/Magnesium Sulfate 20 meq/ Calcium Gluconate 10 meq/ M

ultivitamins 10 ml/Chromium/ Copper/Manganese/ Seleni/Zn 0.5 ml/ Insulin Human 

Regular 30 unit/ Total Parenteral Nutrition/Amino Acids/Dextrose/ Fat Emulsion 

Intravenous 1,920 ml @  80 mls/hr TPN  CONT IV  Last administered on 5/1/20at 

01:00;  Start 4/30/20 at 22:00;  Stop 5/1/20 at 21:59;  Status DC


Magnesium Sulfate 50 ml @ 25 mls/hr 1X  ONCE IV  Last administered on 4/30/20at 

17:18;  Start 4/30/20 at 12:45;  Stop 4/30/20 at 14:44;  Status DC


Potassium Chloride/Water 100 ml @  100 mls/hr 1X  ONCE IV  Last administered on 

5/1/20at 11:27;  Start 5/1/20 at 12:00;  Stop 5/1/20 at 12:59;  Status DC


Hydromorphone HCl (Dilaudid Standard PCA) 12 mg STK-MED ONCE IV ;  Start 4/29/20

at 10:50;  Stop 5/1/20 at 11:02;  Status DC


Hydromorphone HCl (Dilaudid Standard PCA) 12 mg STK-MED ONCE IV ;  Start 4/30/20

at 13:47;  Stop 5/1/20 at 11:03;  Status DC


Potassium Acetate 30 meq/Magnesium Sulfate 20 meq/ Calcium Gluconate 10 meq/ 

Multivitamins 10 ml/Chromium/ Copper/Manganese/ Seleni/Zn 0.5 ml/ Insulin Human 

Regular 30 unit/ Potassium Chloride 30 meq/ Total Parenteral Nutrition/Amino 

Acids/Dextrose/ Fat Emulsion Intravenous 1,920 ml @  80 mls/hr TPN  CONT IV  

Last administered on 5/1/20at 22:34;  Start 5/1/20 at 22:00;  Stop 5/2/20 at 

21:59;  Status DC


Potassium Chloride/Water 100 ml @  100 mls/hr Q1H IV  Last administered on 

5/2/20at 13:05;  Start 5/2/20 at 07:00;  Stop 5/2/20 at 10:59;  Status DC


Magnesium Sulfate 50 ml @ 25 mls/hr 1X  ONCE IV  Last administered on 5/2/20at 

10:34;  Start 5/2/20 at 10:30;  Stop 5/2/20 at 12:29;  Status DC


Potassium Chloride 75 meq/ Magnesium Sulfate 20 meq/Calcium Gluconate 10 meq/ 

Multivitamins 10 ml/Chromium/ Copper/Manganese/ Seleni/Zn 0.5 ml/ Insulin Human 

Regular 30 unit/ Total Parenteral Nutrition/Amino Acids/Dextrose/ Fat Emulsion 

Intravenous 1,920 ml @  80 mls/hr TPN  CONT IV  Last administered on 5/2/20at 

21:51;  Start 5/2/20 at 22:00;  Stop 5/3/20 at 22:00;  Status DC


Potassium Chloride 75 meq/ Magnesium Sulfate 20 meq/Calcium Gluconate 10 meq/ 

Multivitamins 10 ml/Chromium/ Copper/Manganese/ Seleni/Zn 0.5 ml/ Insulin Human 

Regular 25 unit/ Total Parenteral Nutrition/Amino Acids/Dextrose/ Fat Emulsion 

Intravenous 1,920 ml @  80 mls/hr TPN  CONT IV  Last administered on 5/3/20at 

22:04;  Start 5/3/20 at 22:00;  Stop 5/4/20 at 21:59;  Status DC


Hydromorphone HCl (Dilaudid) 0.4 mg PRN Q4HRS  PRN IVP PAIN Last administered on

5/4/20at 10:57;  Start 5/4/20 at 09:00;  Stop 5/4/20 at 18:59;  Status DC


Micafungin Sodium 100 mg/Dextrose 100 ml @  100 mls/hr Q24H IV  Last 

administered on 5/26/20at 12:17;  Start 5/4/20 at 11:00;  Stop 5/27/20 at 09:59;

 Status DC


Daptomycin 485 mg/ Sodium Chloride 50 ml @  100 mls/hr Q24H IV  Last 

administered on 5/11/20at 13:10;  Start 5/4/20 at 11:00;  Stop 5/12/20 at 07:44;

 Status DC


Potassium Chloride 75 meq/ Magnesium Sulfate 15 meq/Calcium Gluconate 8 meq/ 

Multivitamins 10 ml/Chromium/ Copper/Manganese/ Seleni/Zn 0.5 ml/ Insulin Human 

Regular 25 unit/ Total Parenteral Nutrition/Amino Acids/Dextrose/ Fat Emulsion 

Intravenous 1,920 ml @  80 mls/hr TPN  CONT IV  Last administered on 5/4/20at 

23:08;  Start 5/4/20 at 22:00;  Stop 5/5/20 at 21:59;  Status DC


Haloperidol Lactate (Haldol Inj) 3 mg 1X  ONCE IVP  Last administered on 

5/4/20at 14:37;  Start 5/4/20 at 14:30;  Stop 5/4/20 at 14:31;  Status DC


Hydromorphone HCl (Dilaudid) 1 mg PRN Q4HRS  PRN IVP PAIN Last administered on 

5/18/20at 06:25;  Start 5/4/20 at 19:00;  Stop 5/18/20 at 17:10;  Status DC


Potassium Chloride 75 meq/ Magnesium Sulfate 15 meq/Calcium Gluconate 8 meq/ 

Multivitamins 10 ml/Chromium/ Copper/Manganese/ Seleni/Zn 0.5 ml/ Insulin Human 

Regular 20 unit/ Total Parenteral Nutrition/Amino Acids/Dextrose/ Fat Emulsion 

Intravenous 1,920 ml @  80 mls/hr TPN  CONT IV  Last administered on 5/5/20at 

22:10;  Start 5/5/20 at 22:00;  Stop 5/6/20 at 21:59;  Status DC


Lidocaine HCl (Buffered Lidocaine 1%) 3 ml STK-MED ONCE .ROUTE ;  Start 5/6/20 

at 11:31;  Stop 5/6/20 at 11:31;  Status DC


Lidocaine HCl (Buffered Lidocaine 1%) 3 ml STK-MED ONCE .ROUTE ;  Start 5/6/20 

at 12:28;  Stop 5/6/20 at 12:29;  Status DC


Lidocaine HCl (Buffered Lidocaine 1%) 6 ml 1X  ONCE INJ  Last administered on 

5/6/20at 12:53;  Start 5/6/20 at 12:45;  Stop 5/6/20 at 12:46;  Status DC


Potassium Chloride 75 meq/ Magnesium Sulfate 15 meq/Calcium Gluconate 8 meq/ 

Multivitamins 10 ml/Chromium/ Copper/Manganese/ Seleni/Zn 0.5 ml/ Insulin Human 

Regular 20 unit/ Total Parenteral Nutrition/Amino Acids/Dextrose/ Fat Emulsion 

Intravenous 1,920 ml @  80 mls/hr TPN  CONT IV  Last administered on 5/6/20at 

22:00;  Start 5/6/20 at 22:00;  Stop 5/7/20 at 21:59;  Status DC


Potassium Chloride 75 meq/ Magnesium Sulfate 15 meq/Calcium Gluconate 8 meq/ 

Multivitamins 10 ml/Chromium/ Copper/Manganese/ Seleni/Zn 0.5 ml/ Insulin Human 

Regular 15 unit/ Total Parenteral Nutrition/Amino Acids/Dextrose/ Fat Emulsion 

Intravenous 1,920 ml @  80 mls/hr TPN  CONT IV  Last administered on 5/7/20at 

22:28;  Start 5/7/20 at 22:00;  Stop 5/8/20 at 21:59;  Status DC


Vecuronium Bromide (Norcuron Bolus) 6 mg PRN Q6HRS  PRN IV VENT ASYNCHRONY;  

Start 5/7/20 at 19:15;  Stop 5/7/20 at 19:35;  Status DC


Bumetanide (Bumex) 2 mg 1X  ONCE IV  Last administered on 5/7/20at 22:09;  Start

5/7/20 at 19:45;  Stop 5/7/20 at 19:46;  Status DC


Lidocaine HCl (Buffered Lidocaine 1%) 3 ml STK-MED ONCE .ROUTE ;  Start 5/8/20 

at 07:59;  Stop 5/8/20 at 07:59;  Status DC


Midazolam HCl (Versed) 5 mg STK-MED ONCE .ROUTE ;  Start 5/8/20 at 08:36;  Stop 

5/8/20 at 08:36;  Status DC


Fentanyl Citrate (Fentanyl 5ml Vial) 250 mcg STK-MED ONCE .ROUTE ;  Start 5/8/20

at 08:36;  Stop 5/8/20 at 08:37;  Status DC


Lidocaine HCl (Buffered Lidocaine 1%) 3 ml 1X  ONCE IJ  Last administered on 

5/8/20at 09:30;  Start 5/8/20 at 09:15;  Stop 5/8/20 at 09:16;  Status DC


Midazolam HCl (Versed) 5 mg 1X  ONCE IV  Last administered on 5/8/20at 09:30;  

Start 5/8/20 at 09:15;  Stop 5/8/20 at 09:16;  Status DC


Fentanyl Citrate (Fentanyl 5ml Vial) 250 mcg 1X  ONCE IV  Last administered on 

5/8/20at 09:30;  Start 5/8/20 at 09:15;  Stop 5/8/20 at 09:16;  Status DC


Bumetanide (Bumex) 2 mg DAILY IV  Last administered on 5/18/20at 08:07;  Start 

5/8/20 at 10:00;  Stop 5/18/20 at 17:15;  Status DC


Potassium Chloride 75 meq/ Magnesium Sulfate 15 meq/ Multivitamins 10 ml/C

hromium/ Copper/Manganese/ Seleni/Zn 0.5 ml/ Insulin Human Regular 15 unit/ 

Total Parenteral Nutrition/Amino Acids/Dextrose/ Fat Emulsion Intravenous 1,920 

ml @  80 mls/hr TPN  CONT IV  Last administered on 5/8/20at 21:59;  Start 5/8/20

at 22:00;  Stop 5/9/20 at 21:59;  Status DC


Metoclopramide HCl (Reglan Vial) 10 mg PRN Q3HRS  PRN IVP NAUSEA/VOMITING-3rd 

choice Last administered on 5/14/20at 04:25;  Start 5/9/20 at 16:45


Potassium Chloride 75 meq/ Magnesium Sulfate 15 meq/ Multivitamins 10 

ml/Chromium/ Copper/Manganese/ Seleni/Zn 0.5 ml/ Insulin Human Regular 15 unit/ 

Total Parenteral Nutrition/Amino Acids/Dextrose/ Fat Emulsion Intravenous 1,920 

ml @  80 mls/hr TPN  CONT IV  Last administered on 5/9/20at 22:41;  Start 5/9/20

at 22:00;  Stop 5/10/20 at 21:59;  Status DC


Magnesium Sulfate 50 ml @ 25 mls/hr 1X  ONCE IV  Last administered on 5/10/20at 

10:44;  Start 5/10/20 at 09:00;  Stop 5/10/20 at 10:59;  Status DC


Potassium Chloride/Water 100 ml @  100 mls/hr 1X  ONCE IV  Last administered on 

5/10/20at 09:37;  Start 5/10/20 at 09:00;  Stop 5/10/20 at 09:59;  Status DC


Duloxetine HCl (Cymbalta) 30 mg DAILY PO  Last administered on 5/11/20at 09:48; 

Start 5/10/20 at 14:00;  Stop 5/13/20 at 10:25;  Status DC


Potassium Chloride 80 meq/ Magnesium Sulfate 20 meq/ Multivitamins 10 

ml/Chromium/ Copper/Manganese/ Seleni/Zn 0.5 ml/ Insulin Human Regular 15 unit/ 

Total Parenteral Nutrition/Amino Acids/Dextrose/ Fat Emulsion Intravenous 1,920 

ml @  80 mls/hr TPN  CONT IV  Last administered on 5/10/20at 21:42;  Start 5/10

/20 at 22:00;  Stop 5/11/20 at 21:59;  Status DC


Potassium Chloride 80 meq/ Magnesium Sulfate 20 meq/ Multivitamins 10 ml/Chromi

um/ Copper/Manganese/ Seleni/Zn 0.5 ml/ Insulin Human Regular 15 unit/ Total 

Parenteral Nutrition/Amino Acids/Dextrose/ Fat Emulsion Intravenous 1,920 ml @  

80 mls/hr TPN  CONT IV  Last administered on 5/11/20at 22:20;  Start 5/11/20 at 

22:00;  Stop 5/12/20 at 21:59;  Status DC


Lidocaine HCl (Buffered Lidocaine 1%) 3 ml STK-MED ONCE .ROUTE ;  Start 5/12/20 

at 09:54;  Stop 5/12/20 at 09:55;  Status DC


Hydromorphone HCl (Dilaudid Standard PCA) 12 mg STK-MED ONCE IV ;  Start 5/1/20 

at 15:50;  Stop 5/12/20 at 11:24;  Status DC


Potassium Chloride 80 meq/ Magnesium Sulfate 20 meq/ Multivitamins 10 

ml/Chromium/ Copper/Manganese/ Seleni/Zn 0.5 ml/ Insulin Human Regular 15 unit/ 

Total Parenteral Nutrition/Amino Acids/Dextrose/ Fat Emulsion Intravenous 1,920 

ml @  80 mls/hr TPN  CONT IV  Last administered on 5/12/20at 21:40;  Start 

5/12/20 at 22:00;  Stop 5/13/20 at 21:59;  Status DC


Lidocaine HCl (Buffered Lidocaine 1%) 6 ml 1X  ONCE INJ  Last administered on 

5/12/20at 14:15;  Start 5/12/20 at 14:15;  Stop 5/12/20 at 14:16;  Status DC


Potassium Chloride 80 meq/ Magnesium Sulfate 20 meq/ Multivitamins 10 

ml/Chromium/ Copper/Manganese/ Seleni/Zn 1 ml/ Insulin Human Regular 15 unit/ 

Total Parenteral Nutrition/Amino Acids/Dextrose/ Fat Emulsion Intravenous 1,920 

ml @  80 mls/hr TPN  CONT IV  Last administered on 5/13/20at 22:04;  Start 

5/13/20 at 22:00;  Stop 5/14/20 at 21:59;  Status DC


Potassium Chloride/Water 100 ml @  100 mls/hr 1X  ONCE IV  Last administered on 

5/14/20at 11:34;  Start 5/14/20 at 11:00;  Stop 5/14/20 at 11:59;  Status DC


Potassium Chloride 90 meq/ Magnesium Sulfate 20 meq/ Multivitamins 10 

ml/Chromium/ Copper/Manganese/ Seleni/Zn 1 ml/ Insulin Human Regular 15 unit/ 

Total Parenteral Nutrition/Amino Acids/Dextrose/ Fat Emulsion Intravenous 1,920 

ml @  80 mls/hr TPN  CONT IV  Last administered on 5/14/20at 22:57;  Start 

5/14/20 at 22:00;  Stop 5/15/20 at 21:59;  Status DC


Potassium Chloride 90 meq/ Magnesium Sulfate 20 meq/ Multivitamins 10 

ml/Chromium/ Copper/Manganese/ Seleni/Zn 1 ml/ Insulin Human Regular 15 unit/ 

Total Parenteral Nutrition/Amino Acids/Dextrose/ Fat Emulsion Intravenous 1,920 

ml @  80 mls/hr TPN  CONT IV  Last administered on 5/15/20at 22:48;  Start 

5/15/20 at 22:00;  Stop 5/16/20 at 21:59;  Status DC


Potassium Chloride 90 meq/ Magnesium Sulfate 20 meq/ Multivitamins 10 

ml/Chromium/ Copper/Manganese/ Seleni/Zn 1 ml/ Insulin Human Regular 15 unit/ 

Total Parenteral Nutrition/Amino Acids/Dextrose/ Fat Emulsion Intravenous 1,890 

ml @  78.75 mls/ hr TPN  CONT IV  Last administered on 5/16/20at 22:15;  Start 

5/16/20 at 22:00;  Stop 5/17/20 at 21:59;  Status DC


Linezolid/Dextrose 300 ml @  300 mls/hr Q12HR IV  Last administered on 5/19/20at

21:08;  Start 5/17/20 at 09:00;  Stop 5/20/20 at 08:11;  Status DC


Daptomycin 450 mg/ Sodium Chloride 50 ml @  100 mls/hr Q24H IV  Last 

administered on 5/20/20at 09:25;  Start 5/17/20 at 09:00;  Stop 5/21/20 at 

08:30;  Status DC


Potassium Chloride 90 meq/ Magnesium Sulfate 20 meq/ Multivitamins 10 

ml/Chromium/ Copper/Manganese/ Seleni/Zn 1 ml/ Insulin Human Regular 15 unit/ 

Total Parenteral Nutrition/Amino Acids/Dextrose/ Fat Emulsion Intravenous 1,890 

ml @  78.75 mls/ hr TPN  CONT IV  Last administered on 5/17/20at 21:34;  Start 

5/17/20 at 22:00;  Stop 5/18/20 at 21:59;  Status DC


Lorazepam (Ativan Inj) 2 mg STK-MED ONCE .ROUTE ;  Start 5/17/20 at 14:58;  Stop

5/17/20 at 14:58;  Status DC


Metoprolol Tartrate (Lopressor Vial) 5 mg 1X  ONCE IVP  Last administered on 

5/17/20at 15:31;  Start 5/17/20 at 15:15;  Stop 5/17/20 at 15:16;  Status DC


Lorazepam (Ativan Inj) 2 mg 1X  ONCE IVP  Last administered on 5/17/20at 15:30; 

Start 5/17/20 at 15:15;  Stop 5/17/20 at 15:16;  Status DC


Enoxaparin Sodium (Lovenox 40mg Syringe) 40 mg Q24H SQ  Last administered on 

6/5/20at 17:44;  Start 5/17/20 at 17:00;  Stop 6/7/20 at 06:50;  Status DC


Lorazepam (Ativan Inj) 1 mg PRN Q4HRS  PRN IVP ANXIETY / AGITATION MILD-MOD Last

administered on 5/31/20at 15:55;  Start 5/17/20 at 19:15;  Stop 6/2/20 at 11:45;

 Status DC


Lorazepam (Ativan Inj) 2 mg PRN Q4HRS  PRN IVP ANXIETY / AGITATION SEVERE Last 

administered on 6/1/20at 07:55;  Start 5/17/20 at 19:15;  Stop 6/2/20 at 11:45; 

Status DC


Fentanyl Citrate (Fentanyl 2ml Vial) 50 mcg PRN Q4HRS  PRN IVP SEVERE PAIN Last 

administered on 6/13/20at 05:15;  Start 5/18/20 at 13:15;  Stop 6/14/20 at 0

9:29;  Status DC


Fentanyl Citrate (Fentanyl 2ml Vial) 25 mcg PRN Q4HRS  PRN IVP MODERATE PAIN 

Last administered on 6/13/20at 00:27;  Start 5/18/20 at 13:15;  Stop 6/14/20 at 

09:30;  Status DC


Potassium Chloride 90 meq/ Magnesium Sulfate 20 meq/ Multivitamins 10 

ml/Chromium/ Copper/Manganese/ Seleni/Zn 1 ml/ Insulin Human Regular 15 unit/ 

Total Parenteral Nutrition/Amino Acids/Dextrose/ Fat Emulsion Intravenous 1,890 

ml @  78.75 mls/ hr TPN  CONT IV  Last administered on 5/18/20at 22:18;  Start 

5/18/20 at 22:00;  Stop 5/19/20 at 21:59;  Status DC


Furosemide (Lasix) 40 mg 1X  ONCE IVP  Last administered on 5/18/20at 21:51;  

Start 5/18/20 at 21:45;  Stop 5/18/20 at 21:48;  Status DC


Albumin Human 100 ml @  100 mls/hr 1X PRN  PRN IV SEE COMMENTS;  Start 5/19/20 

at 01:30


Furosemide (Lasix) 40 mg [id number] IVP  Last administered on 6/3/20at 08:04;  Start 

5/19/20 at 14:00;  Stop 6/3/20 at 13:07;  Status DC


Potassium Chloride 90 meq/ Magnesium Sulfate 20 meq/ Multivitamins 10 

ml/Chromium/ Copper/Manganese/ Seleni/Zn 1 ml/ Insulin Human Regular 15 unit/ 

Total Parenteral Nutrition/Amino Acids/Dextrose/ Fat Emulsion Intravenous 1,800 

ml @  75 mls/hr TPN  CONT IV  Last administered on 5/19/20at 22:31;  Start 

5/19/20 at 22:00;  Stop 5/20/20 at 21:59;  Status DC


Potassium Chloride 90 meq/ Magnesium Sulfate 20 meq/ Multivitamins 10 

ml/Chromium/ Copper/Manganese/ Seleni/Zn 1 ml/ Insulin Human Regular 15 unit/ 

Total Parenteral Nutrition/Amino Acids/Dextrose/ Fat Emulsion Intravenous 1,800 

ml @  75 mls/hr TPN  CONT IV  Last administered on 5/20/20at 22:28;  Start 

5/20/20 at 22:00;  Stop 5/21/20 at 21:59;  Status DC


Potassium Chloride 110 meq/ Magnesium Sulfate 20 meq/ Multivitamins 10 

ml/Chromium/ Copper/Manganese/ Seleni/Zn 1 ml/ Insulin Human Regular 15 unit/ 

Total Parenteral Nutrition/Amino Acids/Dextrose/ Fat Emulsion Intravenous 1,800 

ml @  75 mls/hr TPN  CONT IV  Last administered on 5/21/20at 22:01;  Start 

5/21/20 at 22:00;  Stop 5/22/20 at 21:59;  Status DC


Saliva Substitute (Biotene Moisturizing Mouth) 2 spray PRN Q15MIN  PRN PO DRY 

MOUTH;  Start 5/21/20 at 11:00


Potassium Chloride 110 meq/ Magnesium Sulfate 20 meq/ Multivitamins 10 

ml/Chromium/ Copper/Manganese/ Seleni/Zn 1 ml/ Insulin Human Regular 15 unit/ 

Total Parenteral Nutrition/Amino Acids/Dextrose/ Fat Emulsion Intravenous 1,800 

ml @  75 mls/hr TPN  CONT IV  Last administered on 5/22/20at 22:21;  Start 

5/22/20 at 22:00;  Stop 5/23/20 at 21:59;  Status DC


Potassium Chloride 110 meq/ Magnesium Sulfate 20 meq/ Multivitamins 10 

ml/Chromium/ Copper/Manganese/ Seleni/Zn 1 ml/ Insulin Human Regular 15 unit/ 

Total Parenteral Nutrition/Amino Acids/Dextrose/ Fat Emulsion Intravenous 1,800 

ml @  75 mls/hr TPN  CONT IV  Last administered on 5/23/20at 22:04;  Start 

5/23/20 at 22:00;  Stop 5/24/20 at 21:59;  Status DC


Potassium Chloride 110 meq/ Magnesium Sulfate 20 meq/ Multivitamins 10 

ml/Chromium/ Copper/Manganese/ Seleni/Zn 1 ml/ Insulin Human Regular 15 unit/ 

Total Parenteral Nutrition/Amino Acids/Dextrose/ Fat Emulsion Intravenous 1,800 

ml @  75 mls/hr TPN  CONT IV  Last administered on 5/24/20at 22:48;  Start 

5/24/20 at 22:00;  Stop 5/25/20 at 21:59;  Status DC


Potassium Chloride 70 meq/ Magnesium Sulfate 20 meq/ Multivitamins 10 

ml/Chromium/ Copper/Manganese/ Seleni/Zn 1 ml/ Insulin Human Regular 15 unit/ 

Total Parenteral Nutrition/Amino Acids/Dextrose/ Fat Emulsion Intravenous 1,800 

ml @  75 mls/hr TPN  CONT IV  Last administered on 5/25/20at 21:39;  Start 

5/25/20 at 22:00;  Stop 5/26/20 at 21:59;  Status DC


Meropenem 500 mg/ Sodium Chloride 50 ml @  100 mls/hr Q6HRS IV  Last a

dministered on 5/27/20at 06:02;  Start 5/25/20 at 18:00;  Stop 5/27/20 at 09:59;

 Status DC


Barium Sulfate (Varibar Thin Liquid Apple) 148 gm 1X  ONCE PO ;  Start 5/26/20 

at 11:45;  Stop 5/26/20 at 11:49;  Status DC


Potassium Chloride 70 meq/ Magnesium Sulfate 20 meq/ Multivitamins 10 

ml/Chromium/ Copper/Manganese/ Seleni/Zn 1 ml/ Insulin Human Regular 15 unit/ 

Total Parenteral Nutrition/Amino Acids/Dextrose/ Fat Emulsion Intravenous 1,800 

ml @  75 mls/hr TPN  CONT IV  Last administered on 5/26/20at 22:27;  Start 

5/26/20 at 22:00;  Stop 5/27/20 at 21:59;  Status DC


Piperacillin Sod/ Tazobactam Sod 3.375 gm/Sodium Chloride 50 ml @  100 mls/hr 

Q6HRS IV  Last administered on 6/4/20at 06:10;  Start 5/27/20 at 12:00;  Stop 

6/4/20 at 07:26;  Status DC


Potassium Chloride 70 meq/ Magnesium Sulfate 20 meq/ Multivitamins 10 

ml/Chromium/ Copper/Manganese/ Seleni/Zn 1 ml/ Insulin Human Regular 15 unit/ 

Total Parenteral Nutrition/Amino Acids/Dextrose/ Fat Emulsion Intravenous 1,800 

ml @  75 mls/hr TPN  CONT IV  Last administered on 5/27/20at 22:03;  Start 

5/27/20 at 22:00;  Stop 5/28/20 at 21:59;  Status DC


Potassium Chloride 70 meq/ Magnesium Sulfate 20 meq/ Multivitamins 10 

ml/Chromium/ Copper/Manganese/ Seleni/Zn 1 ml/ Insulin Human Regular 15 unit/ 

Total Parenteral Nutrition/Amino Acids/Dextrose/ Fat Emulsion Intravenous 1,800 

ml @  75 mls/hr TPN  CONT IV  Last administered on 5/28/20at 22:33;  Start 

5/28/20 at 22:00;  Stop 5/29/20 at 21:59;  Status DC


Potassium Chloride 70 meq/ Magnesium Sulfate 20 meq/ Multivitamins 10 

ml/Chromium/ Copper/Manganese/ Seleni/Zn 1 ml/ Insulin Human Regular 15 unit/ 

Total Parenteral Nutrition/Amino Acids/Dextrose/ Fat Emulsion Intravenous 1,800 

ml @  75 mls/hr TPN  CONT IV  Last administered on 5/29/20at 23:13;  Start 

5/29/20 at 22:00;  Stop 5/30/20 at 21:59;  Status DC


Potassium Chloride 80 meq/ Magnesium Sulfate 20 meq/ Multivitamins 10 

ml/Chromium/ Copper/Manganese/ Seleni/Zn 1 ml/ Insulin Human Regular 15 unit/ 

Total Parenteral Nutrition/Amino Acids/Dextrose/ Fat Emulsion Intravenous 1,800 

ml @  75 mls/hr TPN  CONT IV  Last administered on 5/30/20at 22:30;  Start 

5/30/20 at 22:00;  Stop 5/31/20 at 21:59;  Status DC


Potassium Chloride 80 meq/ Magnesium Sulfate 20 meq/ Multivitamins 10 

ml/Chromium/ Copper/Manganese/ Seleni/Zn 1 ml/ Insulin Human Regular 15 unit/ 

Total Parenteral Nutrition/Amino Acids/Dextrose/ Fat Emulsion Intravenous 1,800 

ml @  75 mls/hr TPN  CONT IV  Last administered on 5/31/20at 21:54;  Start 

5/31/20 at 22:00;  Stop 6/1/20 at 21:59;  Status DC


Potassium Chloride/Water 100 ml @  100 mls/hr 1X  ONCE IV  Last administered on 

6/1/20at 10:15;  Start 6/1/20 at 10:00;  Stop 6/1/20 at 10:59;  Status DC


Potassium Chloride 90 meq/ Magnesium Sulfate 20 meq/ Multivitamins 10 

ml/Chromium/ Copper/Manganese/ Seleni/Zn 1 ml/ Insulin Human Regular 20 unit/ 

Total Parenteral Nutrition/Amino Acids/Dextrose/ Fat Emulsion Intravenous 1,800 

ml @  75 mls/hr TPN  CONT IV  Last administered on 6/1/20at 22:28;  Start 6/1/20

at 22:00;  Stop 6/2/20 at 21:59;  Status DC


Potassium Chloride 90 meq/ Magnesium Sulfate 20 meq/ Multivitamins 10 

ml/Chromium/ Copper/Manganese/ Seleni/Zn 1 ml/ Insulin Human Regular 20 unit/ 

Total Parenteral Nutrition/Amino Acids/Dextrose/ Fat Emulsion Intravenous 1,800 

ml @  75 mls/hr TPN  CONT IV  Last administered on 6/2/20at 22:08;  Start 6/2/20

at 22:00;  Stop 6/3/20 at 21:59;  Status DC


Lorazepam (Ativan Inj) 0.25 mg PRN Q4HRS  PRN IVP ANXIETY / AGITATION Last 

administered on 7/12/20at 00:27;  Start 6/3/20 at 07:30


Potassium Chloride 90 meq/ Magnesium Sulfate 20 meq/ Multivitamins 10 

ml/Chromium/ Copper/Manganese/ Seleni/Zn 1 ml/ Insulin Human Regular 20 unit/ 

Total Parenteral Nutrition/Amino Acids/Dextrose/ Fat Emulsion Intravenous 1,800 

ml @  75 mls/hr TPN  CONT IV  Last administered on 6/3/20at 23:13;  Start 6/3/20

at 22:00;  Stop 6/4/20 at 21:59;  Status DC


Furosemide (Lasix) 40 mg DAILY IVP  Last administered on 6/5/20at 11:14;  Start 

6/3/20 at 13:30;  Stop 6/7/20 at 09:12;  Status DC


Fluoxetine HCl (PROzac) 20 mg QHS PEG  Last administered on 7/12/20at 21:23;  

Start 6/4/20 at 21:00


Fentanyl (Duragesic 50mcg/ Hr Patch) 1 patch Q72H TD  Last administered on 

6/4/20at 21:22;  Start 6/4/20 at 21:00;  Stop 6/13/20 at 12:00;  Status DC


Potassium Chloride 40 meq/ Potassium Acetate 60 meq/Magnesium Sulfate 10 meq/ 

Multivitamins 10 ml/Chromium/ Copper/Manganese/ Seleni/Zn 1 ml/ Insulin Human 

Regular 20 unit/ Total Parenteral Nutrition/Amino Acids/Dextrose/ Fat Emulsion 

Intravenous 1,800 ml @  75 mls/hr TPN  CONT IV  Last administered on 6/5/20at 

00:03;  Start 6/4/20 at 22:00;  Stop 6/5/20 at 21:59;  Status DC


Potassium Acetate 80 meq/Magnesium Sulfate 5 meq/ Multivitamins 10 ml/Chromium/ 

Copper/Manganese/ Seleni/Zn 1 ml/ Insulin Human Regular 20 unit/ Total 

Parenteral Nutrition/Amino Acids/Dextrose/ Fat Emulsion Intravenous 1,920 ml @  

80 mls/hr TPN  CONT IV  Last administered on 6/5/20at 21:59;  Start 6/5/20 at 

22:00;  Stop 6/6/20 at 21:59;  Status DC


Potassium Acetate 60 meq/Magnesium Sulfate 5 meq/ Multivitamins 10 ml/Chromium/ 

Copper/Manganese/ Seleni/Zn 1 ml/ Insulin Human Regular 30 unit/ Total 

Parenteral Nutrition/Amino Acids/Dextrose/ Fat Emulsion Intravenous 1,920 ml @  

80 mls/hr TPN  CONT IV  Last administered on 6/6/20at 21:54;  Start 6/6/20 at 

22:00;  Stop 6/7/20 at 21:59;  Status DC


Norepinephrine Bitartrate 8 mg/ Dextrose 258 ml @  13.332 mls/ hr CONT  PRN IV 

PER PROTOCOL Last administered on 7/2/20at 09:09;  Start 6/7/20 at 06:30


Albumin Human 500 ml @  125 mls/hr 1X  ONCE IV  Last administered on 6/7/20at 

08:10;  Start 6/7/20 at 08:15;  Stop 6/7/20 at 12:14;  Status DC


Potassium Acetate 40 meq/Magnesium Sulfate 5 meq/ Multivitamins 10 ml/Chromium/ 

Copper/Manganese/ Seleni/Zn 1 ml/ Insulin Human Regular 30 unit/ Total 

Parenteral Nutrition/Amino Acids/Dextrose/ Fat Emulsion Intravenous 1,920 ml @  

80 mls/hr TPN  CONT IV  Last administered on 6/7/20at 22:23;  Start 6/7/20 at 

22:00;  Stop 6/8/20 at 21:59;  Status DC


Meropenem 1 gm/ Sodium Chloride 100 ml @  200 mls/hr Q8HRS IV ;  Start 6/7/20 at

14:00;  Status Cancel


Meropenem 1 gm/ Sodium Chloride 100 ml @  200 mls/hr Q8HRS IV  Last administered

on 6/7/20at 11:04;  Start 6/7/20 at 10:00;  Stop 6/7/20 at 13:00;  Status DC


Meropenem 1 gm/ Sodium Chloride 100 ml @  200 mls/hr Q12HR IV  Last administered

on 6/25/20at 08:27;  Start 6/7/20 at 21:00;  Stop 6/25/20 at 08:56;  Status DC


Sodium Chloride 1,000 ml @  1,000 mls/hr 1X  ONCE IV  Last administered on 

6/7/20at 11:06;  Start 6/7/20 at 10:45;  Stop 6/7/20 at 11:44;  Status DC


Micafungin Sodium 100 mg/Dextrose 100 ml @  100 mls/hr Q24H IV  Last 

administered on 6/24/20at 12:34;  Start 6/7/20 at 11:00;  Stop 6/25/20 at 08:56;

 Status DC


Daptomycin 410 mg/ Sodium Chloride 50 ml @  100 mls/hr Q24H IV  Last a

dministered on 6/9/20at 13:33;  Start 6/7/20 at 14:00;  Stop 6/10/20 at 08:30;  

Status DC


Midazolam HCl (Versed) 2 mg STK-MED ONCE .ROUTE ;  Start 6/7/20 at 14:47;  Stop 

6/7/20 at 14:48;  Status DC


Fentanyl Citrate (Fentanyl 2ml Vial) 100 mcg STK-MED ONCE .ROUTE ;  Start 6/7/20

at 14:47;  Stop 6/7/20 at 14:48;  Status DC


Flumazenil (Romazicon) 0.5 mg STK-MED ONCE IV ;  Start 6/7/20 at 14:48;  Stop 

6/7/20 at 14:48;  Status DC


Naloxone HCl (Narcan) 0.4 mg STK-MED ONCE .ROUTE ;  Start 6/7/20 at 14:48;  Stop

6/7/20 at 14:48;  Status DC


Lidocaine HCl (Lidocaine 1% 20ml Vial) 20 ml STK-MED ONCE .ROUTE ;  Start 6/7/20

at 14:48;  Stop 6/7/20 at 14:48;  Status DC


Midazolam HCl (Versed) 2 mg 1X  ONCE IV  Last administered on 6/7/20at 15:28;  

Start 6/7/20 at 15:00;  Stop 6/7/20 at 15:01;  Status DC


Fentanyl Citrate (Fentanyl 2ml Vial) 100 mcg 1X  ONCE IV  Last administered on 

6/7/20at 15:28;  Start 6/7/20 at 15:00;  Stop 6/7/20 at 15:01;  Status DC


Lidocaine HCl (Lidocaine 1% 20ml Vial) 20 ml 1X  ONCE INJ  Last administered on 

6/7/20at 15:30;  Start 6/7/20 at 15:00;  Stop 6/7/20 at 15:01;  Status DC


Sodium Chloride 1,000 ml @  100 mls/hr Q10H IV  Last administered on 6/16/20at 

07:30;  Start 6/7/20 at 20:00;  Stop 6/16/20 at 11:26;  Status DC


Sodium Bicarbonate (Sodium Bicarb Adult 8.4% Syr) 50 meq 1X  ONCE IV  Last 

administered on 6/7/20at 21:47;  Start 6/7/20 at 22:00;  Stop 6/7/20 at 22:01;  

Status DC


Potassium Acetate 40 meq/Magnesium Sulfate 5 meq/ Multivitamins 10 ml/Chromium/ 

Copper/Manganese/ Seleni/Zn 1 ml/ Insulin Human Regular 30 unit/ Total 

Parenteral Nutrition/Amino Acids/Dextrose/ Fat Emulsion Intravenous 1,920 ml @  

80 mls/hr TPN  CONT IV  Last administered on 6/8/20at 22:28;  Start 6/8/20 at 

22:00;  Stop 6/9/20 at 21:59;  Status DC


Sodium Chloride 500 ml @  500 mls/hr 1X  ONCE IV  Last administered on 6/9/20at 

06:39;  Start 6/9/20 at 06:45;  Stop 6/9/20 at 07:44;  Status DC


Potassium Acetate 40 meq/Magnesium Sulfate 5 meq/ Multivitamins 10 ml/Chromium/ 

Copper/Manganese/ Seleni/Zn 1 ml/ Insulin Human Regular 30 unit/ Total 

Parenteral Nutrition/Amino Acids/Dextrose/ Fat Emulsion Intravenous 1,920 ml @  

80 mls/hr TPN  CONT IV  Last administered on 6/9/20at 22:03;  Start 6/9/20 at 

22:00;  Stop 6/10/20 at 21:59;  Status DC


Metoprolol Tartrate (Lopressor Vial) 5 mg PRN Q6HRS  PRN IVP HYPERTENSION Last 

administered on 7/12/20at 18:01;  Start 6/10/20 at 09:00


Potassium Acetate 40 meq/Magnesium Sulfate 5 meq/ Multivitamins 10 ml/Chromium/ 

Copper/Manganese/ Seleni/Zn 1 ml/ Insulin Human Regular 30 unit/ Total 

Parenteral Nutrition/Amino Acids/Dextrose/ Fat Emulsion Intravenous 1,920 ml @  

80 mls/hr TPN  CONT IV  Last administered on 6/10/20at 21:26;  Start 6/10/20 at 

22:00;  Stop 6/11/20 at 21:59;  Status DC


Potassium Acetate 40 meq/Magnesium Sulfate 5 meq/ Multivitamins 10 ml/Chromium/ 

Copper/Manganese/ Seleni/Zn 1 ml/ Insulin Human Regular 30 unit/ Total 

Parenteral Nutrition/Amino Acids/Dextrose/ Fat Emulsion Intravenous 1,920 ml @  

80 mls/hr TPN  CONT IV  Last administered on 6/11/20at 23:23;  Start 6/11/20 at 

22:00;  Stop 6/12/20 at 21:59;  Status DC


Potassium Acetate 40 meq/Magnesium Sulfate 5 meq/ Multivitamins 10 ml/Chromium/ 

Copper/Manganese/ Seleni/Zn 1 ml/ Insulin Human Regular 30 unit/ Total 

Parenteral Nutrition/Amino Acids/Dextrose/ Fat Emulsion Intravenous 1,920 ml @  

80 mls/hr TPN  CONT IV  Last administered on 6/12/20at 21:35;  Start 6/12/20 at 

22:00;  Stop 6/13/20 at 21:59;  Status DC


Furosemide (Lasix) 20 mg 1X  ONCE IVP  Last administered on 6/13/20at 06:26;  

Start 6/13/20 at 06:15;  Stop 6/13/20 at 06:16;  Status DC


Methylprednisolone Sodium Succinate (SOLU-Medrol 125MG VIAL) 125 mg 1X  ONCE IV 

Last administered on 6/13/20at 06:26;  Start 6/13/20 at 06:15;  Stop 6/13/20 at 

06:16;  Status DC


Albuterol/ Ipratropium (Duoneb) 3 ml Q4HRS NEB  Last administered on 7/13/20at 

08:27;  Start 6/13/20 at 08:00


Fentanyl Citrate 30 ml @ 0 mls/hr CONT  PRN IV SEE PROTOCOL Last administered on

7/4/20at 08:03;  Start 6/13/20 at 06:00;  Stop 7/4/20 at 12:42;  Status DC


Propofol 100 ml @ 0 mls/hr CONT  PRN IV SEE PROTOCOL Last administered on 

6/20/20at 23:50;  Start 6/13/20 at 06:00


Fentanyl Citrate (Fentanyl 2ml Vial) 25 mcg PRN Q1HR  PRN IV SEE COMMENTS Last 

administered on 7/13/20at 05:39;  Start 6/13/20 at 06:00


Fentanyl Citrate (Fentanyl 2ml Vial) 50 mcg PRN Q1HR  PRN IV SEE COMMENTS Last 

administered on 7/12/20at 18:02;  Start 6/13/20 at 06:00


Chlorhexidine Gluconate (Peridex) 15 ml BID MM ;  Start 6/13/20 at 09:00;  Stop 

6/13/20 at 07:58;  Status DC


Potassium Acetate 40 meq/Magnesium Sulfate 5 meq/ Multivitamins 10 ml/Chromium/ 

Copper/Manganese/ Seleni/Zn 1 ml/ Insulin Human Regular 30 unit/ Total 

Parenteral Nutrition/Amino Acids/Dextrose/ Fat Emulsion Intravenous 1,920 ml @  

80 mls/hr TPN  CONT IV  Last administered on 6/13/20at 21:19;  Start 6/13/20 at 

22:00;  Stop 6/14/20 at 21:59;  Status DC


Acetylcysteine (Mucomyst 20% Resp Treatment) 600 mg BID NEB  Last administered 

on 6/19/20at 09:33;  Start 6/13/20 at 21:00;  Stop 6/19/20 at 10:39;  Status DC


Magnesium Sulfate 100 ml @  25 mls/hr 1X  ONCE IV  Last administered on 6 /13/20at 15:48;  Start 6/13/20 at 15:45;  Stop 6/13/20 at 19:44;  Status DC


Potassium Acetate 40 meq/Magnesium Sulfate 5 meq/ Multivitamins 10 ml/Chromium/ 

Copper/Manganese/ Seleni/Zn 1 ml/ Insulin Human Regular 30 unit/ Total 

Parenteral Nutrition/Amino Acids/Dextrose/ Fat Emulsion Intravenous 1,920 ml @  

80 mls/hr TPN  CONT IV  Last administered on 6/14/20at 21:35;  Start 6/14/20 at 

22:00;  Stop 6/15/20 at 21:59;  Status DC


Potassium Chloride/Water 100 ml @  100 mls/hr Q1H IV  Last administered on 

6/15/20at 08:31;  Start 6/15/20 at 07:00;  Stop 6/15/20 at 08:59;  Status DC


Potassium Acetate 40 meq/Magnesium Sulfate 5 meq/ Multivitamins 10 ml/Chromium/ 

Copper/Manganese/ Seleni/Zn 1 ml/ Insulin Human Regular 30 unit/ Total 

Parenteral Nutrition/Amino Acids/Dextrose/ Fat Emulsion Intravenous 1,920 ml @  

80 mls/hr TPN  CONT IV  Last administered on 6/15/20at 21:54;  Start 6/15/20 at 

22:00;  Stop 6/16/20 at 19:34;  Status DC


Lidocaine HCl (Buffered Lidocaine 1%) 3 ml STK-MED ONCE .ROUTE ;  Start 6/15/20 

at 12:14;  Stop 6/15/20 at 12:14;  Status DC


Lidocaine HCl (Buffered Lidocaine 1%) 3 ml 1X  ONCE IJ  Last administered on 

6/15/20at 13:11;  Start 6/15/20 at 13:00;  Stop 6/15/20 at 13:01;  Status DC


Magnesium Sulfate 50 ml @ 25 mls/hr 1X  ONCE IV ;  Start 6/16/20 at 08:15;  Stop

6/16/20 at 10:14;  Status DC


Potassium Acetate 40 meq/Magnesium Sulfate 10 meq/ Multivitamins 10 ml/Chromium/

Copper/Manganese/ Seleni/Zn 1 ml/ Insulin Human Regular 20 unit/ Total 

Parenteral Nutrition/Amino Acids/Dextrose/ Fat Emulsion Intravenous 1,920 ml @  

80 mls/hr TPN  CONT IV  Last administered on 6/16/20at 21:32;  Start 6/16/20 at 

22:00;  Stop 6/17/20 at 21:59;  Status DC


Potassium Chloride/Water 100 ml @  100 mls/hr Q1H IV  Last administered on 

6/17/20at 09:12;  Start 6/17/20 at 08:00;  Stop 6/17/20 at 09:59;  Status DC


Alteplase, Recombinant (Cathflo For Central Catheter Clearance) 4 mg 1X  ONCE 

INT CAT ;  Start 6/17/20 at 09:15;  Stop 6/17/20 at 09:16;  Status UNV


Alteplase, Recombinant (Cathflo For Central Catheter Clearance) 4 mg 1X  ONCE 

INT CAT ;  Start 6/17/20 at 09:15;  Stop 6/17/20 at 09:16;  Status UNV


Alteplase, Recombinant (Cathflo For Central Catheter Clearance) 4 mg 1X  ONCE 

INT CAT ;  Start 6/17/20 at 09:15;  Stop 6/17/20 at 09:16;  Status UNV


Alteplase, Recombinant 4 mg/ Sodium Chloride 20 ml @ 20 mls/hr 1X  ONCE IV  Last

administered on 6/17/20at 10:10;  Start 6/17/20 at 10:00;  Stop 6/17/20 at 

10:59;  Status DC


Alteplase, Recombinant 4 mg/ Sodium Chloride 20 ml @ 20 mls/hr 1X  ONCE IV  Last

administered on 6/17/20at 10:09;  Start 6/17/20 at 10:00;  Stop 6/17/20 at 

10:59;  Status DC


Alteplase, Recombinant 4 mg/ Sodium Chloride 20 ml @ 20 mls/hr 1X  ONCE IV  Last

administered on 6/17/20at 10:09;  Start 6/17/20 at 10:00;  Stop 6/17/20 at 

10:59;  Status DC


Potassium Acetate 60 meq/Magnesium Sulfate 10 meq/ Multivitamins 10 ml/Chromium/

Copper/Manganese/ Seleni/Zn 1 ml/ Insulin Human Regular 20 unit/ Total 

Parenteral Nutrition/Amino Acids/Dextrose/ Fat Emulsion Intravenous 1,920 ml @  

80 mls/hr TPN  CONT IV  Last administered on 6/17/20at 21:55;  Start 6/17/20 at 

22:00;  Stop 6/18/20 at 21:59;  Status DC


Albumin Human 500 ml @  125 mls/hr 1X  ONCE IV  Last administered on 6/18/20at 

12:01;  Start 6/18/20 at 11:15;  Stop 6/18/20 at 15:14;  Status DC


Sodium Chloride 500 ml @  500 mls/hr 1X  ONCE IV  Last administered on 6/18/20at

13:50;  Start 6/18/20 at 11:15;  Stop 6/18/20 at 12:14;  Status DC


Potassium Acetate 60 meq/Magnesium Sulfate 14 meq/ Multivitamins 10 ml/Chromium/

Copper/Manganese/ Seleni/Zn 1 ml/ Insulin Human Regular 20 unit/ Total 

Parenteral Nutrition/Amino Acids/Dextrose/ Fat Emulsion Intravenous 1,920 ml @  

80 mls/hr TPN  CONT IV  Last administered on 6/18/20at 22:26;  Start 6/18/20 at 

22:00;  Stop 6/19/20 at 21:59;  Status DC


Ciprofloxacin/ Dextrose 200 ml @  200 mls/hr Q12HR IV  Last administered on 

6/25/20at 08:27;  Start 6/18/20 at 21:00;  Stop 6/25/20 at 08:56;  Status DC


Albumin Human 250 ml @  62.5 mls/hr 1X  ONCE IV  Last administered on 6/19/20at 

11:09;  Start 6/19/20 at 11:00;  Stop 6/19/20 at 14:59;  Status DC


Furosemide (Lasix) 20 mg 1X  ONCE IVP  Last administered on 6/19/20at 14:52;  

Start 6/19/20 at 10:45;  Stop 6/19/20 at 10:49;  Status DC


Potassium Acetate 60 meq/Magnesium Sulfate 14 meq/ Multivitamins 10 ml/Chromium/

Copper/Manganese/ Seleni/Zn 1 ml/ Insulin Human Regular 15 unit/ Total 

Parenteral Nutrition/Amino Acids/Dextrose/ Fat Emulsion Intravenous 1,920 ml @  

80 mls/hr TPN  CONT IV  Last administered on 6/19/20at 22:08;  Start 6/19/20 at 

22:00;  Stop 6/20/20 at 21:59;  Status DC


Potassium Acetate 60 meq/Magnesium Sulfate 14 meq/ Multivitamins 10 ml/Chromium/

Copper/Manganese/ Seleni/Zn 1 ml/ Insulin Human Regular 15 unit/ Total 

Parenteral Nutrition/Amino Acids/Dextrose/ Fat Emulsion Intravenous 1,920 ml @  

80 mls/hr TPN  CONT IV  Last administered on 6/20/20at 22:12;  Start 6/20/20 at 

22:00;  Stop 6/21/20 at 21:59;  Status DC


Potassium Acetate 60 meq/Magnesium Sulfate 14 meq/ Multivitamins 10 ml/Chromium/

Copper/Manganese/ Seleni/Zn 1 ml/ Insulin Human Regular 15 unit/ Total 

Parenteral Nutrition/Amino Acids/Dextrose/ Fat Emulsion Intravenous 1,920 ml @  

80 mls/hr TPN  CONT IV  Last administered on 6/21/20at 22:22;  Start 6/21/20 at 

22:00;  Stop 6/22/20 at 21:59;  Status DC


Furosemide (Lasix) 20 mg 1X  ONCE IVP  Last administered on 6/22/20at 11:07;  

Start 6/22/20 at 10:30;  Stop 6/22/20 at 10:34;  Status DC


Potassium Acetate 60 meq/Magnesium Sulfate 14 meq/ Multivitamins 10 ml/Chromium/

Copper/Manganese/ Seleni/Zn 1 ml/ Insulin Human Regular 15 unit/ Sodium Chloride

20 meq/Total Parenteral Nutrition/Amino Acids/Dextrose/ Fat Emulsion Intravenous

1,920 ml @  80 mls/hr TPN  CONT IV  Last administered on 6/22/20at 21:54;  Start

6/22/20 at 22:00;  Stop 6/23/20 at 21:59;  Status DC


Potassium Acetate 30 meq/Magnesium Sulfate 14 meq/ Multivitamins 10 ml/Chromium/

Copper/Manganese/ Seleni/Zn 1 ml/ Insulin Human Regular 15 unit/ Sodium Chloride

20 meq/Potassium Chloride 30 meq/ Total Parenteral Nutrition/Amino 

Acids/Dextrose/ Fat Emulsion Intravenous 1,920 ml @  80 mls/hr TPN  CONT IV  

Last administered on 6/23/20at 21:46;  Start 6/23/20 at 22:00;  Stop 6/24/20 at 

21:59;  Status DC


Sodium Chloride 80 meq/Potassium Chloride 30 meq/ Potassium Acetate 30 

meq/Magnesium Sulfate 14 meq/ Multivitamins 10 ml/Chromium/ Copper/Manganese/ 

Seleni/Zn 1 ml/ Insulin Human Regular 15 unit/ Total Parenteral Nutrition/Amino 

Acids/Dextrose/ Fat Emulsion Intravenous 1,920 ml @  80 mls/hr TPN  CONT IV  

Last administered on 6/24/20at 22:33;  Start 6/24/20 at 22:00;  Stop 6/25/20 at 

21:59;  Status DC


Furosemide (Lasix) 40 mg 1X  ONCE IVP  Last administered on 6/24/20at 16:27;  

Start 6/24/20 at 15:30;  Stop 6/24/20 at 15:33;  Status DC


Albumin Human 250 ml @  62.5 mls/hr 1X  ONCE IV  Last administered on 6/24/20at 

16:27;  Start 6/24/20 at 15:30;  Stop 6/24/20 at 19:29;  Status DC


Sodium Chloride 80 meq/Potassium Chloride 30 meq/ Potassium Acetate 30 

meq/Magnesium Sulfate 14 meq/ Multivitamins 10 ml/Chromium/ Copper/Manganese/ 

Seleni/Zn 1 ml/ Insulin Human Regular 15 unit/ Total Parenteral Nutrition/Amino 

Acids/Dextrose/ Fat Emulsion Intravenous 1,920 ml @  80 mls/hr TPN  CONT IV  

Last administered on 6/25/20at 22:25;  Start 6/25/20 at 22:00;  Stop 6/26/20 at 

21:59;  Status DC


Sodium Chloride 80 meq/Potassium Chloride 30 meq/ Potassium Acetate 30 

meq/Magnesium Sulfate 14 meq/ Multivitamins 10 ml/Chromium/ Copper/Manganese/ 

Seleni/Zn 1 ml/ Insulin Human Regular 15 unit/ Total Parenteral Nutrition/Amino 

Acids/Dextrose/ Fat Emulsion Intravenous 1,920 ml @  80 mls/hr TPN  CONT IV  

Last administered on 6/26/20at 21:32;  Start 6/26/20 at 22:00;  Stop 6/27/20 at 

21:59;  Status DC


Sodium Chloride 80 meq/Potassium Chloride 30 meq/ Potassium Acetate 30 

meq/Magnesium Sulfate 14 meq/ Multivitamins 10 ml/Chromium/ Copper/Manganese/ 

Seleni/Zn 1 ml/ Insulin Human Regular 15 unit/ Total Parenteral Nutrition/Amino 

Acids/Dextrose/ Fat Emulsion Intravenous 1,920 ml @  80 mls/hr TPN  CONT IV  

Last administered on 6/27/20at 21:53;  Start 6/27/20 at 22:00;  Stop 6/28/20 at 

21:59;  Status DC


Acetylcysteine (Mucomyst 20% Resp Treatment) 600 mg RTBID NEB  Last administered

on 7/13/20at 08:28;  Start 6/27/20 at 12:00


Sodium Chloride 80 meq/Potassium Chloride 30 meq/ Potassium Acetate 30 meq/Mag

nesium Sulfate 14 meq/ Multivitamins 10 ml/Chromium/ Copper/Manganese/ Seleni/Zn

1 ml/ Insulin Human Regular 15 unit/ Total Parenteral Nutrition/Amino 

Acids/Dextrose/ Fat Emulsion Intravenous 1,920 ml @  80 mls/hr TPN  CONT IV  

Last administered on 6/28/20at 22:06;  Start 6/28/20 at 22:00;  Stop 6/29/20 at 

21:59;  Status DC


Meropenem 500 mg/ Sodium Chloride 50 ml @  100 mls/hr Q6HRS IV  Last 

administered on 7/13/20at 05:38;  Start 6/28/20 at 18:00


Daptomycin 500 mg/ Sodium Chloride 50 ml @  100 mls/hr Q24H IV  Last 

administered on 7/6/20at 21:47;  Start 6/28/20 at 19:00;  Stop 7/7/20 at 08:13; 

Status DC


Sodium Chloride 80 meq/Potassium Chloride 30 meq/ Potassium Acetate 30 

meq/Magnesium Sulfate 14 meq/ Multivitamins 10 ml/Chromium/ Copper/Manganese/ 

Seleni/Zn 1 ml/ Insulin Human Regular 15 unit/ Total Parenteral Nutrition/Amino 

Acids/Dextrose/ Fat Emulsion Intravenous 1,920 ml @  80 mls/hr TPN  CONT IV  

Last administered on 6/29/20at 22:09;  Start 6/29/20 at 22:00;  Stop 6/30/20 at 

21:59;  Status DC


Heparin Sodium (Porcine) 1000 unit/Sodium Chloride 1,001 ml @  1,001 mls/hr 1X  

ONCE IRR ;  Start 6/30/20 at 06:00;  Stop 6/30/20 at 06:59;  Status DC


Propofol (Diprivan) 200 mg STK-MED ONCE IV ;  Start 6/30/20 at 07:44;  Stop 

6/30/20 at 07:44;  Status DC


Lidocaine HCl (Lidocaine Pf 2% Vial) 5 ml STK-MED ONCE .ROUTE ;  Start 6/30/20 

at 07:44;  Stop 6/30/20 at 07:44;  Status DC


Fentanyl Citrate (Fentanyl 2ml Vial) 100 mcg STK-MED ONCE .ROUTE ;  Start 

6/30/20 at 07:44;  Stop 6/30/20 at 07:44;  Status DC


Rocuronium Bromide (Zemuron) 100 mg STK-MED ONCE .ROUTE ;  Start 6/30/20 at 

07:44;  Stop 6/30/20 at 07:44;  Status DC


Micafungin Sodium 100 mg/Dextrose 100 ml @  100 mls/hr Q24H IV  Last 

administered on 7/13/20at 08:28;  Start 6/30/20 at 08:30


Bupivacaine HCl/ Epinephrine Bitart (Sensorcain-Epi 0.5%-1:128298 Mpf) 30 ml 

STK-MED ONCE .ROUTE ;  Start 6/30/20 at 08:34;  Stop 6/30/20 at 08:35;  Status 

DC


Iohexol (Omnipaque 300 Mg/ml) 50 ml STK-MED ONCE .ROUTE  Last administered on 

6/30/20at 13:30;  Start 6/30/20 at 08:35;  Stop 6/30/20 at 08:35;  Status DC


Sodium Chloride 80 meq/Potassium Chloride 30 meq/ Potassium Acetate 30 

meq/Magnesium Sulfate 14 meq/ Multivitamins 10 ml/Chromium/ Copper/Manganese/ 

Seleni/Zn 1 ml/ Insulin Human Regular 15 unit/ Total Parenteral Nutrition/Amino 

Acids/Dextrose/ Fat Emulsion Intravenous 1,920 ml @  80 mls/hr TPN  CONT IV  

Last administered on 7/1/20at 01:22;  Start 6/30/20 at 22:00;  Stop 7/1/20 at 

21:59;  Status DC


Phenylephrine HCl (Ken-Synephrine Inj) 10 mg STK-MED ONCE .ROUTE ;  Start 6/3

0/20 at 10:15;  Stop 6/30/20 at 10:15;  Status DC


Desflurane (Suprane) 90 ml STK-MED ONCE IH ;  Start 6/30/20 at 10:18;  Stop 6/ 30/20 at 10:19;  Status DC


Albumin Human 500 ml @  As Directed STK-MED ONCE IV ;  Start 6/30/20 at 11:06;  

Stop 6/30/20 at 11:06;  Status DC


Vasopressin (Vasostrict) 20 unit STK-MED ONCE .ROUTE ;  Start 6/30/20 at 12:23; 

Stop 6/30/20 at 12:23;  Status DC


Phenylephrine HCl (Ken-Synephrine Inj) 10 mg STK-MED ONCE .ROUTE ;  Start 

6/30/20 at 13:33;  Stop 6/30/20 at 13:33;  Status DC


Phenylephrine HCl (Ken-Synephrine Inj) 10 mg STK-MED ONCE .ROUTE ;  Start 

6/30/20 at 13:33;  Stop 6/30/20 at 13:33;  Status DC


Ondansetron HCl (Zofran) 4 mg STK-MED ONCE .ROUTE ;  Start 6/30/20 at 13:33;  

Stop 6/30/20 at 13:33;  Status DC


Enoxaparin Sodium (Lovenox 40mg Syringe) 40 mg Q24H SQ  Last administered on 

7/13/20at 09:13;  Start 7/1/20 at 08:00


Sodium Chloride (Normal Saline Flush) 3 ml QSHIFT  PRN IV AFTER MEDS AND BLOOD 

DRAWS;  Start 6/30/20 at 14:45


Naloxone HCl (Narcan) 0.4 mg PRN Q2MIN  PRN IV SEE INSTRUCTIONS;  Start 6/30/20 

at 14:45


Sodium Chloride 1,000 ml @  25 mls/hr Q24H IV  Last administered on 7/12/20at 

14:27;  Start 6/30/20 at 14:33


Morphine Sulfate (Morphine Sulfate) 1 mg PRN Q1HR  PRN IV PAIN;  Start 6/30/20 

at 14:45


Midazolam HCl 100 mg/Sodium Chloride 100 ml @ 1 mls/hr CONT  PRN IV SEE I/O 

RECORD Last administered on 7/3/20at 18:48;  Start 6/30/20 at 14:45


Phenylephrine HCl (PHENYLEPHRINE in 0.9% NACL PF) 1 mg STK-MED ONCE IV ;  Start 

6/30/20 at 14:44;  Stop 6/30/20 at 14:45;  Status DC


Ephedrine Sulfate (ePHEDrine PF IN SALINE SYRINGE) 50 mg STK-MED ONCE IV ;  

Start 6/30/20 at 14:45;  Stop 6/30/20 at 14:45;  Status DC


Vasopressin 20 unit/Dextrose 101 ml @  12 mls/hr CONT  PRN IV SEE I/O RECORD 

Last administered on 7/7/20at 04:17;  Start 6/30/20 at 15:30


Sodium Chloride 1,000 ml @  1,000 mls/hr 1X  ONCE IV  Last administered on 

6/30/20at 15:42;  Start 6/30/20 at 15:45;  Stop 6/30/20 at 16:44;  Status DC


Albumin Human 500 ml @  125 mls/hr 1X  ONCE IV ;  Start 6/30/20 at 16:00;  Stop 

6/30/20 at 19:59;  Status DC


Albumin Human 500 ml @  125 mls/hr PRN Q1HR  PRN IV PER PROTOCOL;  Start 6/30/20

at 15:45


Magnesium Sulfate 50 ml @ 25 mls/hr 1X  ONCE IV  Last administered on 6/30/20at 

17:02;  Start 6/30/20 at 16:30;  Stop 6/30/20 at 18:29;  Status DC


Sodium Bicarbonate (Sodium Bicarb Adult 8.4% Syr) 50 meq STK-MED ONCE .ROUTE ;  

Start 6/30/20 at 16:20;  Stop 6/30/20 at 16:20;  Status DC


Sodium Bicarbonate (Sodium Bicarb Adult 8.4% Syr) 100 meq 1X  ONCE IV  Last 

administered on 6/30/20at 17:07;  Start 6/30/20 at 16:30;  Stop 6/30/20 at 

16:31;  Status DC


Sodium Bicarbonate 150 meq/Dextrose 1,150 ml @  75 mls/hr 1X  ONCE IV  Last 

administered on 6/30/20at 20:02;  Start 6/30/20 at 16:30;  Stop 7/1/20 at 07:49;

 Status DC


Sodium Chloride 80 meq/Potassium Chloride 30 meq/ Potassium Acetate 30 

meq/Magnesium Sulfate 14 meq/ Multivitamins 10 ml/Chromium/ Copper/Manganese/ 

Seleni/Zn 1 ml/ Insulin Human Regular 15 unit/ Total Parenteral Nutrition/Amino 

Acids/Dextrose/ Fat Emulsion Intravenous 1,920 ml @  80 mls/hr TPN  CONT IV  

Last administered on 7/1/20at 23:05;  Start 7/1/20 at 22:00;  Stop 7/2/20 at 

21:59;  Status DC


Sodium Chloride 100 meq/Potassium Chloride 30 meq/ Potassium Acetate 30 

meq/Magnesium Sulfate 12 meq/ Multivitamins 10 ml/Chromium/ Copper/Manganese/ 

Seleni/Zn 1 ml/ Insulin Human Regular 15 unit/ Total Parenteral Nutrition/Amino 

Acids/Dextrose/ Fat Emulsion Intravenous 1,920 ml @  80 mls/hr TPN  CONT IV  

Last administered on 7/2/20at 21:52;  Start 7/2/20 at 22:00;  Stop 7/3/20 at 

21:59;  Status DC


Sodium Chloride 100 meq/Potassium Chloride 30 meq/ Potassium Acetate 30 

meq/Magnesium Sulfate 12 meq/ Multivitamins 10 ml/Chromium/ Copper/Manganese/ 

Seleni/Zn 1 ml/ Insulin Human Regular 15 unit/ Total Parenteral Nutrition/Amino 

Acids/Dextrose/ Fat Emulsion Intravenous 1,920 ml @  80 mls/hr TPN  CONT IV  

Last administered on 7/3/20at 21:46;  Start 7/3/20 at 22:00;  Stop 7/4/20 at 

21:59;  Status DC


Sodium Chloride 100 meq/Potassium Chloride 30 meq/ Potassium Acetate 30 

meq/Magnesium Sulfate 12 meq/ Multivitamins 10 ml/Chromium/ Copper/Manganese/ 

Seleni/Zn 1 ml/ Insulin Human Regular 15 unit/ Total Parenteral Nutrition/Amino 

Acids/Dextrose/ Fat Emulsion Intravenous 1,800 ml @  75 mls/hr TPN  CONT IV  

Last administered on 7/4/20at 22:04;  Start 7/4/20 at 22:00;  Stop 7/5/20 at 

21:59;  Status DC


Fentanyl Citrate 55 ml @ 0 mls/hr CONT  PRN IV SEE COMMENTS Last administered on

7/6/20at 23:55;  Start 7/4/20 at 13:00;  Stop 7/9/20 at 17:28;  Status DC


Sodium Chloride 100 meq/Potassium Chloride 30 meq/ Potassium Acetate 30 

meq/Magnesium Sulfate 12 meq/ Multivitamins 10 ml/Chromium/ Copper/Manganese/ 

Seleni/Zn 1 ml/ Insulin Human Regular 15 unit/ Total Parenteral Nutrition/Amino 

Acids/Dextrose/ Fat Emulsion Intravenous 1,680 ml @  70 mls/hr TPN  CONT IV  

Last administered on 7/5/20at 21:23;  Start 7/5/20 at 22:00;  Stop 7/6/20 at 

21:59;  Status DC


Sodium Chloride 110 meq/Potassium Chloride 30 meq/ Potassium Acetate 30 

meq/Magnesium Sulfate 15 meq/ Multivitamins 10 ml/Chromium/ Copper/Manganese/ 

Seleni/Zn 1 ml/ Insulin Human Regular 15 unit/ Total Parenteral Nutrition/Amino 

Acids/Dextrose/ Fat Emulsion Intravenous 1,680 ml @  70 mls/hr TPN  CONT IV  

Last administered on 7/6/20at 21:48;  Start 7/6/20 at 22:00;  Stop 7/7/20 at 

21:59;  Status DC


Sodium Chloride 110 meq/Potassium Chloride 30 meq/ Potassium Acetate 30 

meq/Magnesium Sulfate 15 meq/ Multivitamins 10 ml/Chromium/ Copper/Manganese/ S

haley/Zn 1 ml/ Insulin Human Regular 15 unit/ Total Parenteral Nutrition/Amino 

Acids/Dextrose/ Fat Emulsion Intravenous 1,680 ml @  70 mls/hr TPN  CONT IV  

Last administered on 7/7/20at 21:33;  Start 7/7/20 at 22:00;  Stop 7/8/20 at 

21:59;  Status DC


Sodium Chloride 110 meq/Potassium Chloride 30 meq/ Potassium Acetate 30 

meq/Magnesium Sulfate 15 meq/ Multivitamins 10 ml/Chromium/ Copper/Manganese/ 

Seleni/Zn 1 ml/ Insulin Human Regular 15 unit/ Total Parenteral Nutrition/Amino 

Acids/Dextrose/ Fat Emulsion Intravenous 1,680 ml @  70 mls/hr TPN  CONT IV  

Last administered on 7/8/20at 21:51;  Start 7/8/20 at 22:00;  Stop 7/9/20 at 

21:59;  Status DC


Sodium Chloride 90 meq/Potassium Chloride 30 meq/ Potassium Acetate 30 

meq/Magnesium Sulfate 15 meq/ Multivitamins 10 ml/Chromium/ Copper/Manganese/ 

Seleni/Zn 1 ml/ Insulin Human Regular 15 unit/ Total Parenteral Nutrition/Amino 

Acids/Dextrose/ Fat Emulsion Intravenous 1,680 ml @  70 mls/hr TPN  CONT IV  

Last administered on 7/9/20at 22:38;  Start 7/9/20 at 22:00;  Stop 7/10/20 at 

21:59;  Status DC


Fentanyl Citrate 30 ml @ 0 mls/hr CONT  PRN IV SEE I/O RECORD;  Start 7/9/20 at 

17:30


Fentanyl (Duragesic 12mcg/ Hr Patch) 1 patch Q3DAYS TD  Last administered on 

7/13/20at 10:01;  Start 7/10/20 at 09:00


Sodium Chloride 90 meq/Potassium Chloride 30 meq/ Potassium Acetate 30 

meq/Magnesium Sulfate 15 meq/ Multivitamins 10 ml/Chromium/ Copper/Manganese/ 

Seleni/Zn 1 ml/ Insulin Human Regular 15 unit/ Total Parenteral Nutrition/Amino 

Acids/Dextrose/ Fat Emulsion Intravenous 1,680 ml @  70 mls/hr TPN  CONT IV  

Last administered on 7/10/20at 21:59;  Start 7/10/20 at 22:00;  Stop 7/11/20 at 

21:59;  Status DC


Sodium Chloride 90 meq/Potassium Chloride 30 meq/ Potassium Acetate 30 

meq/Magnesium Sulfate 15 meq/ Multivitamins 10 ml/Chromium/ Copper/Manganese/ 

Seleni/Zn 1 ml/ Insulin Human Regular 15 unit/ Total Parenteral Nutrition/Amino 

Acids/Dextrose/ Fat Emulsion Intravenous 1,680 ml @  70 mls/hr TPN  CONT IV  

Last administered on 7/11/20at 21:35;  Start 7/11/20 at 22:00;  Stop 7/12/20 at 

21:59;  Status DC


Vancomycin HCl (Vanco Per Pharmacy) 1 each PRN DAILY  PRN MC SEE COMMENTS Last 

administered on 7/12/20at 16:07;  Start 7/12/20 at 09:15


Ciprofloxacin/ Dextrose 200 ml @  200 mls/hr Q12HR IV  Last administered on 

7/13/20at 08:31;  Start 7/12/20 at 10:00


Vancomycin HCl 2 gm/Sodium Chloride 500 ml @  250 mls/hr 1X  ONCE IV  Last 

administered on 7/12/20at 10:34;  Start 7/12/20 at 10:00;  Stop 7/12/20 at 

11:59;  Status DC


Sodium Chloride 90 meq/Potassium Chloride 30 meq/ Potassium Acetate 30 m

eq/Magnesium Sulfate 15 meq/ Multivitamins 10 ml/Chromium/ Copper/Manganese/ 

Seleni/Zn 1 ml/ Insulin Human Regular 15 unit/ Total Parenteral Nutrition/Amino 

Acids/Dextrose/ Fat Emulsion Intravenous 1,680 ml @  70 mls/hr TPN  CONT IV  

Last administered on 7/12/20at 22:02;  Start 7/12/20 at 22:00;  Stop 7/13/20 at 

21:59


Diphenhydramine HCl (Benadryl) 25 mg 1X  ONCE IVP  Last administered on 

7/12/20at 14:26;  Start 7/12/20 at 14:30;  Stop 7/12/20 at 14:31;  Status DC


Vancomycin HCl 1.5 gm/Sodium Chloride 500 ml @  250 mls/hr Q8H IV  Last 

administered on 7/13/20at 03:08;  Start 7/12/20 at 18:30


Vancomycin HCl (Vancomycin Trough Level) 1 each 1X  ONCE MC ;  Start 7/13/20 at 

10:00;  Stop 7/13/20 at 10:01;  Status DC





Active Scripts


Active


Reported


Bisoprolol Fumarate 5 Mg Tablet 10 Mg PO DAILY


Vitals/I & O





Vital Sign - Last 24 Hours








 7/12/20 7/12/20 7/12/20 7/12/20





 11:00 11:50 12:00 12:06


 


Temp   100.0 





   100.0 


 


Pulse 124  127 


 


Resp 41  38 


 


B/P (MAP) 150/91 (110)  133/78 (96) 


 


Pulse Ox 98  100 97


 


O2 Delivery Trach shield Trach Collar Trach shield Tracheal Collar


 


O2 Flow Rate 9.0 9.0 9.0 8.0


 


    





    





 7/12/20 7/12/20 7/12/20 7/12/20





 12:42 13:00 13:15 14:00


 


Temp    99.2





    99.2


 


Pulse  126  123


 


Resp 40 38 33 36


 


B/P (MAP)  146/87 (106)  132/86 (101)


 


Pulse Ox 100 100  95


 


O2 Delivery Tracheal Collar Trach shield Tracheal Collar Trach shield


 


O2 Flow Rate 9.0 9.0 9.0 9.0


 


    





    





 7/12/20 7/12/20 7/12/20 7/12/20





 15:00 15:34 16:00 16:00


 


Temp   100.2 





   100.2 


 


Pulse 126  127 


 


Resp 32  28 


 


B/P (MAP) 153/91 (111)  113/91 (98) 


 


Pulse Ox 96 97 100 


 


O2 Delivery Ventilator Ventilator Ventilator Mechanical Ventilator


 


    





    





 7/12/20 7/12/20 7/12/20 7/12/20





 17:00 17:30 18:00 18:01


 


Temp   101.5 





   101.5 


 


Pulse 134  134 134


 


Resp 26  28 


 


B/P (MAP) 152/84 (106)  149/80 (103) 152/84


 


Pulse Ox 97 97 97 


 


O2 Delivery Ventilator Ventilator Ventilator 


 


    





    





 7/12/20 7/12/20 7/12/20 7/12/20





 18:02 18:35 19:00 20:00


 


Pulse   114 


 


Resp 33 28 20 


 


B/P (MAP)   117/66 (83) 


 


Pulse Ox 97 98 98 


 


O2 Delivery Ventilator Ventilator Ventilator Mechanical Ventilator





 7/12/20 7/12/20 7/12/20 7/12/20





 20:00 20:20 21:00 22:00


 


Temp 100.4   





 100.4   


 


Pulse 110  110 106


 


Resp 20  12 12


 


B/P (MAP) 144/81 (102)  129/76 (93) 119/73 (88)


 


Pulse Ox 99 100 100 100


 


O2 Delivery Ventilator Ventilator Ventilator Ventilator


 


    





    





 7/12/20 7/12/20 7/13/20 7/13/20





 23:00 23:16 00:00 00:00


 


Temp    99.9





    99.9


 


Pulse 113   114


 


Resp 18   20


 


B/P (MAP) 116/71 (86)   116/81 (93)


 


Pulse Ox 99 100  99


 


O2 Delivery Ventilator Ventilator Mechanical Ventilator Ventilator


 


    





    





 7/13/20 7/13/20 7/13/20 7/13/20





 01:00 02:00 02:38 03:00


 


Pulse 115 116  120


 


Resp 21 25  31


 


B/P (MAP) 125/77 (93) 119/78 (92)  111/73 (86)


 


Pulse Ox 100 100 99 99


 


O2 Delivery Ventilator Ventilator Ventilator Ventilator





 7/13/20 7/13/20 7/13/20 7/13/20





 03:45 04:00 04:40 05:00


 


Temp  100.3  





  100.3  


 


Pulse  118  119


 


Resp  20  19


 


B/P (MAP)  131/85 (100)  138/98 (111)


 


Pulse Ox  100 100 99


 


O2 Delivery Mechanical Ventilator Ventilator Ventilator Ventilator


 


    





    





 7/13/20 7/13/20 7/13/20 





 06:00 08:28 10:01 


 


Pulse 116   


 


Resp 20  28 


 


B/P (MAP) 148/74 (98)   


 


Pulse Ox 100 97 40 


 


O2 Delivery Ventilator Ventilator Tracheal Collar 














Intake and Output   


 


 7/12/20 7/12/20 7/13/20





 15:00 23:00 07:00


 


Intake Total 50 ml 200 ml 2584 ml


 


Output Total 820 ml 1135 ml 1115 ml


 


Balance -770 ml -935 ml 1469 ml











Justicifation of Admission Dx:


Justifications for Admission:


Justification of Admission Dx:  Yes











GAETANO GONCALVES MD        Jul 13, 2020 10:33

## 2020-07-14 VITALS — SYSTOLIC BLOOD PRESSURE: 113 MMHG | DIASTOLIC BLOOD PRESSURE: 61 MMHG

## 2020-07-14 VITALS — DIASTOLIC BLOOD PRESSURE: 83 MMHG | SYSTOLIC BLOOD PRESSURE: 138 MMHG

## 2020-07-14 VITALS — SYSTOLIC BLOOD PRESSURE: 124 MMHG | DIASTOLIC BLOOD PRESSURE: 71 MMHG

## 2020-07-14 VITALS — DIASTOLIC BLOOD PRESSURE: 75 MMHG | SYSTOLIC BLOOD PRESSURE: 125 MMHG

## 2020-07-14 VITALS — DIASTOLIC BLOOD PRESSURE: 83 MMHG | SYSTOLIC BLOOD PRESSURE: 141 MMHG

## 2020-07-14 VITALS — DIASTOLIC BLOOD PRESSURE: 77 MMHG | SYSTOLIC BLOOD PRESSURE: 133 MMHG

## 2020-07-14 VITALS — SYSTOLIC BLOOD PRESSURE: 138 MMHG | DIASTOLIC BLOOD PRESSURE: 81 MMHG

## 2020-07-14 VITALS — SYSTOLIC BLOOD PRESSURE: 119 MMHG | DIASTOLIC BLOOD PRESSURE: 73 MMHG

## 2020-07-14 VITALS — DIASTOLIC BLOOD PRESSURE: 76 MMHG | SYSTOLIC BLOOD PRESSURE: 127 MMHG

## 2020-07-14 VITALS — SYSTOLIC BLOOD PRESSURE: 129 MMHG | DIASTOLIC BLOOD PRESSURE: 77 MMHG

## 2020-07-14 VITALS — SYSTOLIC BLOOD PRESSURE: 114 MMHG | DIASTOLIC BLOOD PRESSURE: 71 MMHG

## 2020-07-14 VITALS — SYSTOLIC BLOOD PRESSURE: 134 MMHG | DIASTOLIC BLOOD PRESSURE: 83 MMHG

## 2020-07-14 VITALS — DIASTOLIC BLOOD PRESSURE: 89 MMHG | SYSTOLIC BLOOD PRESSURE: 131 MMHG

## 2020-07-14 VITALS — SYSTOLIC BLOOD PRESSURE: 105 MMHG | DIASTOLIC BLOOD PRESSURE: 64 MMHG

## 2020-07-14 VITALS — DIASTOLIC BLOOD PRESSURE: 68 MMHG | SYSTOLIC BLOOD PRESSURE: 121 MMHG

## 2020-07-14 VITALS — DIASTOLIC BLOOD PRESSURE: 83 MMHG | SYSTOLIC BLOOD PRESSURE: 134 MMHG

## 2020-07-14 VITALS — DIASTOLIC BLOOD PRESSURE: 70 MMHG | SYSTOLIC BLOOD PRESSURE: 119 MMHG

## 2020-07-14 VITALS — SYSTOLIC BLOOD PRESSURE: 149 MMHG | DIASTOLIC BLOOD PRESSURE: 87 MMHG

## 2020-07-14 VITALS — SYSTOLIC BLOOD PRESSURE: 131 MMHG | DIASTOLIC BLOOD PRESSURE: 71 MMHG

## 2020-07-14 VITALS — DIASTOLIC BLOOD PRESSURE: 82 MMHG | SYSTOLIC BLOOD PRESSURE: 123 MMHG

## 2020-07-14 VITALS — SYSTOLIC BLOOD PRESSURE: 131 MMHG | DIASTOLIC BLOOD PRESSURE: 85 MMHG

## 2020-07-14 VITALS — DIASTOLIC BLOOD PRESSURE: 80 MMHG | SYSTOLIC BLOOD PRESSURE: 140 MMHG

## 2020-07-14 VITALS — SYSTOLIC BLOOD PRESSURE: 132 MMHG | DIASTOLIC BLOOD PRESSURE: 75 MMHG

## 2020-07-14 VITALS — DIASTOLIC BLOOD PRESSURE: 62 MMHG | SYSTOLIC BLOOD PRESSURE: 116 MMHG

## 2020-07-14 LAB
ANION GAP SERPL CALC-SCNC: 6 MMOL/L (ref 6–14)
BUN SERPL-MCNC: 9 MG/DL (ref 7–20)
CALCIUM SERPL-MCNC: 8.4 MG/DL (ref 8.5–10.1)
CHLORIDE SERPL-SCNC: 102 MMOL/L (ref 98–107)
CO2 SERPL-SCNC: 28 MMOL/L (ref 21–32)
CREAT SERPL-MCNC: 0.5 MG/DL (ref 0.6–1)
GFR SERPLBLD BASED ON 1.73 SQ M-ARVRAT: 131.1 ML/MIN
GLUCOSE SERPL-MCNC: 130 MG/DL (ref 70–99)
POTASSIUM SERPL-SCNC: 4 MMOL/L (ref 3.5–5.1)
SODIUM SERPL-SCNC: 136 MMOL/L (ref 136–145)

## 2020-07-14 RX ADMIN — MEROPENEM SCH MLS/HR: 500 INJECTION, POWDER, FOR SOLUTION INTRAVENOUS at 06:00

## 2020-07-14 RX ADMIN — DEXTROSE SCH MLS/HR: 50 INJECTION, SOLUTION INTRAVENOUS at 09:00

## 2020-07-14 RX ADMIN — IPRATROPIUM BROMIDE AND ALBUTEROL SULFATE SCH ML: .5; 3 SOLUTION RESPIRATORY (INHALATION) at 03:30

## 2020-07-14 RX ADMIN — IPRATROPIUM BROMIDE AND ALBUTEROL SULFATE SCH ML: .5; 3 SOLUTION RESPIRATORY (INHALATION) at 10:46

## 2020-07-14 RX ADMIN — IPRATROPIUM BROMIDE AND ALBUTEROL SULFATE SCH ML: .5; 3 SOLUTION RESPIRATORY (INHALATION) at 04:00

## 2020-07-14 RX ADMIN — FENTANYL CITRATE PRN MCG: 50 INJECTION INTRAMUSCULAR; INTRAVENOUS at 09:02

## 2020-07-14 RX ADMIN — IPRATROPIUM BROMIDE AND ALBUTEROL SULFATE SCH ML: .5; 3 SOLUTION RESPIRATORY (INHALATION) at 10:44

## 2020-07-14 RX ADMIN — VANCOMYCIN HYDROCHLORIDE PRN EACH: 1 INJECTION, POWDER, LYOPHILIZED, FOR SOLUTION INTRAVENOUS at 02:46

## 2020-07-14 RX ADMIN — ACETAMINOPHEN PRN MG: 650 SUPPOSITORY RECTAL at 17:06

## 2020-07-14 RX ADMIN — IPRATROPIUM BROMIDE AND ALBUTEROL SULFATE SCH ML: .5; 3 SOLUTION RESPIRATORY (INHALATION) at 16:13

## 2020-07-14 RX ADMIN — INSULIN LISPRO SCH UNITS: 100 INJECTION, SOLUTION INTRAVENOUS; SUBCUTANEOUS at 06:00

## 2020-07-14 RX ADMIN — MEROPENEM SCH MLS/HR: 500 INJECTION, POWDER, FOR SOLUTION INTRAVENOUS at 00:50

## 2020-07-14 RX ADMIN — IPRATROPIUM BROMIDE AND ALBUTEROL SULFATE SCH ML: .5; 3 SOLUTION RESPIRATORY (INHALATION) at 08:00

## 2020-07-14 RX ADMIN — BACITRACIN SCH MLS/HR: 5000 INJECTION, POWDER, FOR SOLUTION INTRAMUSCULAR at 14:33

## 2020-07-14 RX ADMIN — Medication PRN EACH: at 10:47

## 2020-07-14 RX ADMIN — INSULIN LISPRO SCH UNITS: 100 INJECTION, SOLUTION INTRAVENOUS; SUBCUTANEOUS at 18:00

## 2020-07-14 RX ADMIN — CIPROFLOXACIN SCH MLS/HR: 2 INJECTION, SOLUTION INTRAVENOUS at 09:00

## 2020-07-14 RX ADMIN — ACETYLCYSTEINE SCH MG: 200 INHALANT RESPIRATORY (INHALATION) at 08:00

## 2020-07-14 RX ADMIN — CIPROFLOXACIN SCH MLS/HR: 2 INJECTION, SOLUTION INTRAVENOUS at 20:36

## 2020-07-14 RX ADMIN — MEROPENEM SCH MLS/HR: 500 INJECTION, POWDER, FOR SOLUTION INTRAVENOUS at 12:39

## 2020-07-14 RX ADMIN — ENOXAPARIN SODIUM SCH MG: 40 INJECTION SUBCUTANEOUS at 09:01

## 2020-07-14 RX ADMIN — MEROPENEM SCH MLS/HR: 500 INJECTION, POWDER, FOR SOLUTION INTRAVENOUS at 17:06

## 2020-07-14 RX ADMIN — FENTANYL CITRATE PRN MCG: 50 INJECTION INTRAMUSCULAR; INTRAVENOUS at 20:37

## 2020-07-14 RX ADMIN — IPRATROPIUM BROMIDE AND ALBUTEROL SULFATE SCH ML: .5; 3 SOLUTION RESPIRATORY (INHALATION) at 12:40

## 2020-07-14 RX ADMIN — PANTOPRAZOLE SODIUM SCH MG: 40 INJECTION, POWDER, FOR SOLUTION INTRAVENOUS at 09:01

## 2020-07-14 RX ADMIN — VANCOMYCIN HYDROCHLORIDE SCH MLS/HR: 1 INJECTION, POWDER, FOR SOLUTION INTRAVENOUS at 09:00

## 2020-07-14 RX ADMIN — IPRATROPIUM BROMIDE AND ALBUTEROL SULFATE SCH ML: .5; 3 SOLUTION RESPIRATORY (INHALATION) at 00:49

## 2020-07-14 RX ADMIN — INSULIN LISPRO SCH UNITS: 100 INJECTION, SOLUTION INTRAVENOUS; SUBCUTANEOUS at 12:00

## 2020-07-14 RX ADMIN — IPRATROPIUM BROMIDE AND ALBUTEROL SULFATE SCH ML: .5; 3 SOLUTION RESPIRATORY (INHALATION) at 19:51

## 2020-07-14 RX ADMIN — FENTANYL CITRATE PRN MCG: 50 INJECTION INTRAMUSCULAR; INTRAVENOUS at 13:08

## 2020-07-14 RX ADMIN — INSULIN LISPRO SCH UNITS: 100 INJECTION, SOLUTION INTRAVENOUS; SUBCUTANEOUS at 00:00

## 2020-07-14 RX ADMIN — ACETYLCYSTEINE SCH MG: 200 INHALANT RESPIRATORY (INHALATION) at 19:51

## 2020-07-14 RX ADMIN — VANCOMYCIN HYDROCHLORIDE SCH MLS/HR: 1 INJECTION, POWDER, FOR SOLUTION INTRAVENOUS at 22:07

## 2020-07-14 NOTE — PDOC
Objective:


Objective:


D/w nurse - getting ready to change dressings, plans to work with therapy later.


Vital Signs:





                                   Vital Signs








  Date Time  Temp Pulse Resp B/P (MAP) Pulse Ox O2 Delivery O2 Flow Rate FiO2


 


7/14/20 10:00  130 35 119/70 (86) 99 Tracheal Collar 8.0 


 


7/14/20 08:00 99.2       





 99.2       








Labs:





Laboratory Tests








Test


 7/13/20


18:03 7/14/20


00:53 7/14/20


05:50


 


Glucose (Fingerstick)


 110 mg/dL


(70-99) 120 mg/dL


(70-99) 123 mg/dL


(70-99)











PE:





GEN: NAD - waves hello, gives me a thumbs up


LUNGS: trach collar


HEART: tachycardic


ABD: many drains


NEURO/PSYCH: A & O 3





A/P:


S/p pancreatic necrosectomy





--


Continue support.





Justicifation of Admission Dx:


Justifications for Admission:


Justification of Admission Dx:  Yes











RAGHAVENDRA MURCIA         Jul 14, 2020 11:07

## 2020-07-14 NOTE — PDOC
SURGICAL PROGRESS NOTE


Subjective


resting


Vital Signs





Vital Signs








  Date Time  Temp Pulse Resp B/P (MAP) Pulse Ox O2 Delivery O2 Flow Rate FiO2


 


7/14/20 09:02   40   Tracheal Collar 8.0 


 


7/14/20 03:00  110  123/82 (96) 100   


 


7/14/20 00:00 99.1       





 99.1       








I&O











Intake and Output 


 


 7/14/20





 07:00


 


Intake Total 2053 ml


 


Output Total 2332 ml


 


Balance -279 ml


 


 


 


IV Total 2053 ml


 


Output Urine Total 1040 ml


 


Drainage Total 1292 ml








PATIENT HAS A VILLASENOR:  Yes


General:  Cooperative, No acute distress


Abdomen:  Soft, Other (mulitple drains )


Labs





Laboratory Tests








Test


 7/12/20


12:38 7/12/20


17:58 7/13/20


00:21 7/13/20


05:35


 


Glucose (Fingerstick)


 302 mg/dL


(70-99) 106 mg/dL


(70-99) 124 mg/dL


(70-99) 





 


White Blood Count


 


 


 


 10.5 x10^3/uL


(4.0-11.0)


 


Red Blood Count


 


 


 


 2.48 x10^6/uL


(3.50-5.40)


 


Hemoglobin


 


 


 


 7.4 g/dL


(12.0-15.5)


 


Hematocrit


 


 


 


 21.7 %


(36.0-47.0)


 


Mean Corpuscular Volume    88 fL () 


 


Mean Corpuscular Hemoglobin    30 pg (25-35) 


 


Mean Corpuscular Hemoglobin


Concent 


 


 


 34 g/dL


(31-37)


 


Red Cell Distribution Width


 


 


 


 14.9 %


(11.5-14.5)


 


Platelet Count


 


 


 


 543 x10^3/uL


(140-400)


 


Neutrophils (%) (Auto)    83 % (31-73) 


 


Lymphocytes (%) (Auto)    8 % (24-48) 


 


Monocytes (%) (Auto)    8 % (0-9) 


 


Eosinophils (%) (Auto)    2 % (0-3) 


 


Basophils (%) (Auto)    0 % (0-3) 


 


Neutrophils # (Auto)


 


 


 


 8.7 x10^3/uL


(1.8-7.7)


 


Lymphocytes # (Auto)


 


 


 


 0.8 x10^3/uL


(1.0-4.8)


 


Monocytes # (Auto)


 


 


 


 0.8 x10^3/uL


(0.0-1.1)


 


Eosinophils # (Auto)


 


 


 


 0.2 x10^3/uL


(0.0-0.7)


 


Basophils # (Auto)


 


 


 


 0.0 x10^3/uL


(0.0-0.2)


 


Sodium Level


 


 


 


 137 mmol/L


(136-145)


 


Potassium Level


 


 


 


 4.3 mmol/L


(3.5-5.1)


 


Chloride Level


 


 


 


 104 mmol/L


()


 


Carbon Dioxide Level


 


 


 


 29 mmol/L


(21-32)


 


Anion Gap    4 (6-14) 


 


Blood Urea Nitrogen    9 mg/dL (7-20) 


 


Creatinine


 


 


 


 0.5 mg/dL


(0.6-1.0)


 


Estimated GFR


(Cockcroft-Gault) 


 


 


 131.1 





 


Glucose Level


 


 


 


 113 mg/dL


(70-99)


 


Calcium Level


 


 


 


 8.5 mg/dL


(8.5-10.1)


 


Phosphorus Level


 


 


 


 3.8 mg/dL


(2.6-4.7)


 


Magnesium Level


 


 


 


 2.1 mg/dL


(1.8-2.4)


 


Test


 7/13/20


05:37 7/13/20


10:00 7/13/20


18:03 7/14/20


00:50


 


Glucose (Fingerstick)


 110 mg/dL


(70-99) 


 110 mg/dL


(70-99) 





 


Vancomycin Level Trough


 


 25.0 mcg/mL


(10.0-20.0) 


 





 


Vancomycin Last Dose Date  07-13-20   


 


Vancomycin Last Dose Time  0230   


 


Random Vancomycin Level    20.6 mcg/mL 


 


Test


 7/14/20


00:53 7/14/20


05:40 7/14/20


05:50 





 


Glucose (Fingerstick)


 120 mg/dL


(70-99) 


 123 mg/dL


(70-99) 





 


Sodium Level


 


 136 mmol/L


(136-145) 


 





 


Potassium Level


 


 4.0 mmol/L


(3.5-5.1) 


 





 


Chloride Level


 


 102 mmol/L


() 


 





 


Carbon Dioxide Level


 


 28 mmol/L


(21-32) 


 





 


Anion Gap  6 (6-14)   


 


Blood Urea Nitrogen  9 mg/dL (7-20)   


 


Creatinine


 


 0.5 mg/dL


(0.6-1.0) 


 





 


Estimated GFR


(Cockcroft-Gault) 


 131.1 


 


 





 


Glucose Level


 


 130 mg/dL


(70-99) 


 





 


Calcium Level


 


 8.4 mg/dL


(8.5-10.1) 


 











Laboratory Tests








Test


 7/13/20


10:00 7/13/20


18:03 7/14/20


00:50 7/14/20


00:53


 


Vancomycin Level Trough


 25.0 mcg/mL


(10.0-20.0) 


 


 





 


Vancomycin Last Dose Date 07-13-20    


 


Vancomycin Last Dose Time 0230    


 


Glucose (Fingerstick)


 


 110 mg/dL


(70-99) 


 120 mg/dL


(70-99)


 


Random Vancomycin Level   20.6 mcg/mL  


 


Test


 7/14/20


05:40 7/14/20


05:50 


 





 


Sodium Level


 136 mmol/L


(136-145) 


 


 





 


Potassium Level


 4.0 mmol/L


(3.5-5.1) 


 


 





 


Chloride Level


 102 mmol/L


() 


 


 





 


Carbon Dioxide Level


 28 mmol/L


(21-32) 


 


 





 


Anion Gap 6 (6-14)    


 


Blood Urea Nitrogen 9 mg/dL (7-20)    


 


Creatinine


 0.5 mg/dL


(0.6-1.0) 


 


 





 


Estimated GFR


(Cockcroft-Gault) 131.1 


 


 


 





 


Glucose Level


 130 mg/dL


(70-99) 


 


 





 


Calcium Level


 8.4 mg/dL


(8.5-10.1) 


 


 





 


Glucose (Fingerstick)


 


 123 mg/dL


(70-99) 


 











Problem List


Problems


Medical Problems:


(1) Acute pancreatitis


Status: Acute  





(2) Cholelithiasis


Status: Acute  








Assessment/Plan


continue supportive care





Justicifation of Admission Dx:


Justifications for Admission:


Justification of Admission Dx:  Yes











SAURAV NASH APRN            Jul 14, 2020 09:50

## 2020-07-14 NOTE — NUR
Pharmacy Vancomycin Dosing Note



S: Consulted to monitor and dose vancomycin started 07/12/20.



O: JESENIA BEAN is a 49 year old F with 

Abscess

HCAP, .



Other Antibiotics: 

CIPRO, MERREM, MICAFUGIN



LABS:

Last BUN: 9 

Last Creatinine: 0.5 

Creatinine Clearance: >100 mL/min

Last WBC: 10.5 

Last Procalcitonin: - 

Tmax (past 24 hours): 101.5 



Microbiology:  

7/12 BCX NGTD



I/O: 7644/3110 



Drug Levels:

Last Trough level: 20.6  on 07/14/20 at 0050 

Last dose given 07/13/20 at 0230 



Vancomycin Dosing:

Dosing Weight: Actual

Target Trough: 15-20





A: Based on: Trough, Actual Wt and CrCl  

             Held dosing till 7/14/20 1000



P: 1. 7/14/20 1000 Restarted & Decreased  Vancomycin 1500 mg IV q12h

   2. Follow up Trough level on 07/15/20 at 1000 

   3. Pharmacy will continue to monitor, follow and adjust therapy as needed.

 

 ISATU HERMAN RPH, 07/14/20 0248

-------------------------------------------------------------------------------

Signed:    07/14/20 at 0250 by ISATU HERMAN RPH PHA

-------------------------------------------------------------------------------

## 2020-07-14 NOTE — PDOC
PULMONARY PROGRESS NOTES


Subjective


Patient on trach shield, awake alert following command


Vitals





Vital Signs








  Date Time  Temp Pulse Resp B/P (MAP) Pulse Ox O2 Delivery O2 Flow Rate FiO2


 


7/14/20 09:02   40   Tracheal Collar 8.0 


 


7/14/20 03:00  110  123/82 (96) 100   


 


7/14/20 00:00 99.1       





 99.1       








ROS:  No Nausea, No Chest Pain, No Increase Cough


General:  Alert


HEENT:  Other (trach site ok)


Lungs:  Crackles


Cardiovascular:  S1, S2


Abdomen:  Soft, Non-tender, Other (multiple ROBERT drains )


Neuro Exam:  Alert


Extremities:  Other (+1 BLE edema)


Skin:  Warm


Labs





Laboratory Tests








Test


 7/12/20


12:38 7/12/20


17:58 7/13/20


00:21 7/13/20


05:35


 


Glucose (Fingerstick)


 302 mg/dL


(70-99) 106 mg/dL


(70-99) 124 mg/dL


(70-99) 





 


White Blood Count


 


 


 


 10.5 x10^3/uL


(4.0-11.0)


 


Red Blood Count


 


 


 


 2.48 x10^6/uL


(3.50-5.40)


 


Hemoglobin


 


 


 


 7.4 g/dL


(12.0-15.5)


 


Hematocrit


 


 


 


 21.7 %


(36.0-47.0)


 


Mean Corpuscular Volume    88 fL () 


 


Mean Corpuscular Hemoglobin    30 pg (25-35) 


 


Mean Corpuscular Hemoglobin


Concent 


 


 


 34 g/dL


(31-37)


 


Red Cell Distribution Width


 


 


 


 14.9 %


(11.5-14.5)


 


Platelet Count


 


 


 


 543 x10^3/uL


(140-400)


 


Neutrophils (%) (Auto)    83 % (31-73) 


 


Lymphocytes (%) (Auto)    8 % (24-48) 


 


Monocytes (%) (Auto)    8 % (0-9) 


 


Eosinophils (%) (Auto)    2 % (0-3) 


 


Basophils (%) (Auto)    0 % (0-3) 


 


Neutrophils # (Auto)


 


 


 


 8.7 x10^3/uL


(1.8-7.7)


 


Lymphocytes # (Auto)


 


 


 


 0.8 x10^3/uL


(1.0-4.8)


 


Monocytes # (Auto)


 


 


 


 0.8 x10^3/uL


(0.0-1.1)


 


Eosinophils # (Auto)


 


 


 


 0.2 x10^3/uL


(0.0-0.7)


 


Basophils # (Auto)


 


 


 


 0.0 x10^3/uL


(0.0-0.2)


 


Sodium Level


 


 


 


 137 mmol/L


(136-145)


 


Potassium Level


 


 


 


 4.3 mmol/L


(3.5-5.1)


 


Chloride Level


 


 


 


 104 mmol/L


()


 


Carbon Dioxide Level


 


 


 


 29 mmol/L


(21-32)


 


Anion Gap    4 (6-14) 


 


Blood Urea Nitrogen    9 mg/dL (7-20) 


 


Creatinine


 


 


 


 0.5 mg/dL


(0.6-1.0)


 


Estimated GFR


(Cockcroft-Gault) 


 


 


 131.1 





 


Glucose Level


 


 


 


 113 mg/dL


(70-99)


 


Calcium Level


 


 


 


 8.5 mg/dL


(8.5-10.1)


 


Phosphorus Level


 


 


 


 3.8 mg/dL


(2.6-4.7)


 


Magnesium Level


 


 


 


 2.1 mg/dL


(1.8-2.4)


 


Test


 7/13/20


05:37 7/13/20


10:00 7/13/20


18:03 7/14/20


00:50


 


Glucose (Fingerstick)


 110 mg/dL


(70-99) 


 110 mg/dL


(70-99) 





 


Vancomycin Level Trough


 


 25.0 mcg/mL


(10.0-20.0) 


 





 


Vancomycin Last Dose Date  07-13-20   


 


Vancomycin Last Dose Time  0230   


 


Random Vancomycin Level    20.6 mcg/mL 


 


Test


 7/14/20


00:53 7/14/20


05:40 7/14/20


05:50 





 


Glucose (Fingerstick)


 120 mg/dL


(70-99) 


 123 mg/dL


(70-99) 





 


Sodium Level


 


 136 mmol/L


(136-145) 


 





 


Potassium Level


 


 4.0 mmol/L


(3.5-5.1) 


 





 


Chloride Level


 


 102 mmol/L


() 


 





 


Carbon Dioxide Level


 


 28 mmol/L


(21-32) 


 





 


Anion Gap  6 (6-14)   


 


Blood Urea Nitrogen  9 mg/dL (7-20)   


 


Creatinine


 


 0.5 mg/dL


(0.6-1.0) 


 





 


Estimated GFR


(Cockcroft-Gault) 


 131.1 


 


 





 


Glucose Level


 


 130 mg/dL


(70-99) 


 





 


Calcium Level


 


 8.4 mg/dL


(8.5-10.1) 


 











Laboratory Tests








Test


 7/13/20


10:00 7/13/20


18:03 7/14/20


00:50 7/14/20


00:53


 


Vancomycin Level Trough


 25.0 mcg/mL


(10.0-20.0) 


 


 





 


Vancomycin Last Dose Date 07-13-20    


 


Vancomycin Last Dose Time 0230    


 


Glucose (Fingerstick)


 


 110 mg/dL


(70-99) 


 120 mg/dL


(70-99)


 


Random Vancomycin Level   20.6 mcg/mL  


 


Test


 7/14/20


05:40 7/14/20


05:50 


 





 


Sodium Level


 136 mmol/L


(136-145) 


 


 





 


Potassium Level


 4.0 mmol/L


(3.5-5.1) 


 


 





 


Chloride Level


 102 mmol/L


() 


 


 





 


Carbon Dioxide Level


 28 mmol/L


(21-32) 


 


 





 


Anion Gap 6 (6-14)    


 


Blood Urea Nitrogen 9 mg/dL (7-20)    


 


Creatinine


 0.5 mg/dL


(0.6-1.0) 


 


 





 


Estimated GFR


(Cockcroft-Gault) 131.1 


 


 


 





 


Glucose Level


 130 mg/dL


(70-99) 


 


 





 


Calcium Level


 8.4 mg/dL


(8.5-10.1) 


 


 





 


Glucose (Fingerstick)


 


 123 mg/dL


(70-99) 


 











Medications





Active Scripts








 Medications  Dose


 Route/Sig


 Max Daily Dose Days Date Category


 


 Bisoprolol


 Fumarate 5 Mg


 Tablet  10 Mg


 PO DAILY


   3/16/20 Reported








Comments


ct reviewed 7/12/20, Decreased left-sided effusion after catheter placement. The




right-sided effusion has increased as has atelectasis.


 





 


There has been exchange or placement of multiple drainage 


tubes and a gastrojejunostomy tube. Both collections are smaller. No 


significant new abdominal fluid collection is seen. The jejunal component 


of the gastrojejunostomy tube appears to be looped in the proximal small 


bowel. 











ct abdomen /pelvis 6/6


1. Removal of the percutaneous pigtail drainage catheters since the prior 


exam. Sequela of pancreatitis with extensive pseudocysts again 


demonstrated, the right-sided collections are slightly larger since the 


prior exam, the left-sided collections are stable. See above.


2. Moderate to large left pleural effusion with atelectasis and collapse 


of most of the left lower lobe, stable. Small right pleural effusion is 


stable.


3. Gallstone.





ct chest 6/15 reviewed








 GRAM NEG COCCOBACILLI:MANY


        SQUAMOUS EPI CELL:RARE


        PMN (WBCs):[id number]


        Unless otherwise specified, Testing Performed by:


        [address] [address]


        For Inquires, the Physician may contact the Microbiology


        department at [phone]





  RESPIRATORY CULTURE  Final  


        Final





        MANY GRAM NEGATIVE RODS on 06/15/20 at 1107


        FINAL ID= [PSEUDOMONAS AERUGINOSA]


        MICRO CHARGES


        PSEUDOMONAS AERUGINOSA





  ANTIMICROBIAL SUSCEPTIBILITY  Final  


        Comment





        NEG ANSON 56


        PSEUDOMONAS AERUGINOSA


        ANTIBIOTIC                        RESULT          INTERPRETATION


        AMIKACIN                          <=16                  S


        AZTREONAM                         <=4                   S


        CEFTAZIDIME                       <=1                   S


        CIPROFLOXACIN                     <=0.25                S


        CEFEPIME                          <=2                   S


        CEFTAZIDIME/AVIBACTAM             <=4                   S


        GENTAMICIN                        <=2                   S


        LEVOFLOXACIN                      <=0.5                 S





Impression


.





IMPRESSION:


1.  Acute hypoxemic respiratory failure secondary to ARDS status post trach, 

developed anemia 6/7, blood drainage from RLQ abdomen drain site, and 

surrounding firmness  / developed septic shock 6/7 from abdomen source, required

levo 6/7


s/p 3 new drains 6/7 with brown color drainage,  


2.  Gallstone pancreatitis, now with ongoing bleeding from prior drain. Anemic. 

s/p Tx multiple units over several days


3.  septic shock/sepsis, recurrent 6/7, source abdomen. new fever  ? aspiration 

pneumonitis/pneumonia


4.  Acute kidney injury-, Off HD--renal function decling. suspect JED on CKD due

to hypotension , improved now


5.  Acute gallstone pancreatitis.


6.  Hypoalbuminemia.


7.  Moderate persistent effusions, s/p left thora  5/12, reaccumulation of left 

effusion. O2 requirement not changed. 


8.  Fever- ,hypotension. suspect recurrent sepsis/ likely pancreatic source.  

Per ID, per surgery--


9.  Chronic anemia-- ongoing / s/p PRBC


10. Covid 19 testing negative


11. Moderate to large ascites-S/P paracentisis


12.S/P paracentisis with 4 liters removed on 4/15/20


13. S/P IR drain placement on 5/8/2020, removal, re inserted 6/7


14. Depression/Anxiety 


15.,  Fever, per ID


16.  Status post chest tube placement, not much drainage


6/30


S/P Exploratory laparotomy, lysis of adhesions, subtotal cholecystectomy with 

cholangiogram, gastrojejunostomy tube placement, pancreatic necrosectomy


leukocytosis- improving





Plan


.


Cathflo for chest tube, I think it is clogged


Continue trach shield


Up to chair


Follow culture


Follow surgery input


DVT GI prophylaxis


ABX per ID 


merrem and micafungin 


Added vanc and cipro per id


f/u BC /resp cultures 


Continue TPN for nutrition support 


DVT/GI PPX


D/W RN and RT,











STEVE MIRANDA MD              Jul 14, 2020 09:08

## 2020-07-14 NOTE — PDOC
Infectious Disease Note


Subjective


Subjective


Patient is awake on a ventilator through trach





ROS


ROS


No nausea vomiting diarrhea or fever





Vital Sign


Vital Signs





Vital Signs








  Date Time  Temp Pulse Resp B/P (MAP) Pulse Ox O2 Delivery O2 Flow Rate FiO2


 


7/14/20 03:00  110 17 123/82 (96) 100 Ventilator  


 


7/14/20 00:00 99.1       





 99.1       


 


7/13/20 16:02       8.0 











Physical Exam


PHYSICAL EXAM


GENERAL: Propped up in bed, awake, weak appearing 


HEENT: Pupils equal, oral cavity dry. NGT out, on vent


NECK:  Tracheostomy 


LUNGS: Diminished aeration bases,  CT on left 


HEART:  S1, S2, regular, tachy 


ABDOMEN: Sightly less distended, bowel sounds hypoactive, soft, richardson x 2, 3 ROBERT

drains, G-J tube and + wound vac 


: [place] in place 


EXTREMITIES: Generalized edema, no cyanosis. SCDs & Podus boots bilaterally, 


SKIN: warm touch. No signs of rash.  


NEURO: awake, mouthing some words, tracking 


LUE-PICC without signs of complications 


LUE art-line out, mottling about old art-line site is improving. RP palpable, 

cap refill brisk.





Labs


Lab





Laboratory Tests








Test


 7/13/20


10:00 7/13/20


18:03 7/14/20


00:50 7/14/20


00:53


 


Vancomycin Level Trough


 25.0 mcg/mL


(10.0-20.0) 


 


 





 


Vancomycin Last Dose Date 07-13-20    


 


Vancomycin Last Dose Time 0230    


 


Glucose (Fingerstick)


 


 110 mg/dL


(70-99) 


 120 mg/dL


(70-99)


 


Random Vancomycin Level   20.6 mcg/mL  


 


Test


 7/14/20


05:50 


 


 





 


Glucose (Fingerstick)


 123 mg/dL


(70-99) 


 


 











Micro








Objective


Assessment


Patient with prolonged hospitalization more than 3 months


Multiple medical problems


Multiple surgical procedures





S/P Exp. 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 

parapsilosis & PSAE (I-merrem/ceftazidime/AZT/cefepime))


Leucocytosis -trending upward 


Fever 


Acute gallstone pancreatitis with persistent necrosis


  - 4/9.  CT A/P Increased ascites. Persistent evidence of necrotizing 

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


  - 4/27. status post ROBERT drain placement; C. parapsilosis. s/p drain 5/6 + yeast

& high amylase; s/p additional drain on 5/8. Drains removed. 


  -5/6. fluid  candida parapsilosis fluid, amylase high


  - 6/6 showed multiple pseudocysts, slight larger on the right. s/p drains x 3,

6/7.  + PSAE (MDRO-R Cefepime, Zosyn ANSON < 64) and yeast, 


  -6/7 s/p drain replacement x 3; fluid cult PSAE (MDRO), yeast; treated


  -7/12 CT A/P shows smaller fluid collections.         


Ascites s/p paracentesis 4/15 & 5/6. C. parapsilosis 


Cholelithiasis with thickening of the gallbladder wall.


JED, Hyperkalemia, Metabolic acidosis off dialysis


Acute hypoxic resp failure. trach/vent. sputum 6/13  + PSAE (I merrem) 


Pleural effusions s/p left thoracentesis, 5/12. no culture. s/p left chest tube,

6/15 no growth


Hypocalcemia 


Prediabetes


HTN


Anemia s/p PRBCs





Plan


Plan of Care


merrem and micafungin 


Add vanc per pharmacy protocal and cipro 


f/u BC /resp cultures 


Labs in am


wound care /drain management as directed


Contact isolation for CRE/MDRO


D/w nursing 





Critically ill











LINN FRANZ MD               Jul 14, 2020 08:10

## 2020-07-14 NOTE — NUR
Pharmacy TPN Dosing Note



S: JESENIA BEAN is a 49 year old F Currently receiving Central Continuous TPN started 
03/18/20



B:Pertinent PMH: 

Necrotizing pancreatitis

Height: 5 feet, 8 inches

Weight: [id number] kg



Current diet: NPO 



LABS:

Sodium:    136 

Potassium: 4.0 

Chloride:  102 

Calcium:   8.4 

Corrected Calcium: 10.72 

Magnesium: 2.1 

CO2:       28 

SCr:       0.5 

Glucose:   120-130 

Albumin:   1.1 

AST:       25 

ALT:       37 



TPN FORMULA:

TPN TYPE:  Central Continuous

AMINO ACIDS:         85 gm

DEXTROSE:            250 gm

LIPIDS:              20 gm

SODIUM CHLORIDE:     90 mEq

SODIUM ACETATE:      - mEq

SODIUM PHOSPHATE:    - mmol

POTASSIUM CHLORIDE:  30 mEq

POTASSIUM ACETATE:   30 mEq

POTASSIUM PHOSPHATE: - mmol

MAGNESIUM:           15 mEq

CALCIUM:             - mEq

INSULIN:             15 units

MULTIPLE VITAMIN:    10 ml

TRACE ELEMENTS:      1 ml ml(s)



TPN PLAN:  

Tube feed not tolerated, on hold indefinitely. Lytes stable, no changes.

No labs due to stability.





R: Continue TPN AS ABOVE.

Will monitor electrolytes, glucose, and tolerance to TPN.



 CANDACE BELTRE [facility], 07/14/20 1041

## 2020-07-14 NOTE — PDOC
PROGRESS NOTES


Chief Complaint


Chief Complaint


A/P


Acute hypoxic Respiratory failure required  mechanical ventilation


Tracheostomy


bilateral pleural effusions/pulm edema s/p Throacentesis on 6/15/2020


Severe Acute gallstone pancreatitis (not a surgical candidate at this time) with

necrosis


Acute kidney failure now requiring dialysis


Gallstones (Calculus of gallbladder with acute cholecystitis without 

obstruction)


HTN 


Intractable pain


Intractable nausea


Covid 19 negative. 


Acute on chronic anemia 


EEG: No seizure activityFever  - better currently - intermittent could be from 

underlying pancreatitis blood cults 5/4 - neg so far


? Ileus with vomiting


Abd distention - U/S and CT reviewed s/p 0.4 L of opaque, debris-containing 

ascites was removed 5/6


Acute pancreatitis with persistent necrosis


Gallstone pancreatitis with necrosis. 


   -CT A/P 6/6 showed multiple pseudocysts, slight larger on the right. s/p 

drains x 3, 6/7.  + PSAE (MDRO-R Cefepime, Zosyn ANSON < 64) and yeast, 


   -s/p drain 4/27. C. parapsilosis. s/p drain 5/6 + yeast & high amylase; s/p 

additional drain on 5/8. Drains removed. 


Ascites s/p paracentesis 4/15 & 5/6. C. parapsilosis 


JED. off HD. 


A large fluid collection in the pancreatic bed has slightly decreased in size, 

described below, the pancreas itself is difficult to  visualize, which could be 

due to necrosis or obscuration of pancreatic  parenchyma from the surrounding 

fluid collection.6/15 


- 4/27 status post ROBERT drain placement + C paropsilosis. s/p additional drains 

5/8


Anemia - S/p PRBCs


Cholelithiasis with thickening of the gallbladder wall.


Leucocytosis improving


JED, hyperkalemia, Metabolic acidosis off dialysis


hypocalcemia 


Prediabetes


HTN


s/p trach


ESRD on HD


Hyperglycemia


severe protein-caloric malnutrition


Moderate to large left pleural effusion with atelectasis and collapse  of most 

of the left lower lobe, stable


 


Dispo - ICU, critically ill


Poor prognosis





History of Present Illness


History of Present Illness


6/8: IR placed drain on 6/7. 4u PRBC after Hb drop. Hb 8.8 today. Off Levophed 

this morning. T-max 100.3. Much more lethargic today. CXR with left sided 

diffuse infiltrates.


6/9: Tachycardic overnight into the 140s. NGT clamped. On BIPAP currently. 

Drains with serosanguinous discharge. WBC 8, Tmax 99.6F.


6/10: Seen on trach shield in ICU.  Hypertensive and tachycardic. Labs stable. 

blood stained drainage from drains. Afebrile.


6/11: Seen on trach shield in ICU. She is a bit confused, drowsy, but when 

sitting up is conversational and confusion somewhat clears. She is asking for 

more pain medication. Stable drains, still very tachy.Na 147


6/12: Patient vomited overnight. Aspirated. Tried to pull her trach out, she was

told she would die without her trach, she said "I know, I just want to go home".

Hb 7.6. Afebrile, still very tachycardic. 1055ml out of right sided ROBERT drain


6/13: Overnight hypoxic, on BIPAP. CXR with left sided white out lung. 

Significant mucous plug suctioned by RT with improvement in her ABG after 2 

hours this morning. Not really active, tired, lethargic. 890ml out of drains 

past 24 hours. On vent. D/w daughter bedside.


6/29: To OR for pancreatic necrosectomy, cholecystectomy, lysis of adhesions, 

gastrostomy tube placement





7/7,  awake on vent, weaning sedation, cont other, still with marked drainage


7/8: Still on fentanyl. WBC 15.4, Hb 8.7 Metabolic panel WNL except calcium 10.2

with albumin 1.1. She awakens off sedation, still connected to vent currently.


7/9: FiO2 40% 5 PEEP, WBC 13.4, Hb 7.9, platelets 551.  Still on fentanyl, she 

is working with PT.


7/10: Off vent during the day. Working with PT. Labs stable. Hb to 8.5. Working 

with PT/OT. She has been suctioning quite a bit of respiratory secretions as 

well as loose bowels. C diff pending.


7/11: Afebrile, still with loose bowels overnight C. difficile negative.  WBC up

to 16 coughing up thick yellow sputum able to cough out her trach when taken off

trach shield she still has O2 saturations 97%.  Gastrostomy tube to gravity with

fairly significant drainage that appears to be tube feeds and gastric 

secretions.  She notes pain is better controlled with the fentanyl patch.  Blood

pressure elevated heart rate elevated is on metoprolol.


7/10: Overnight febrile 101.9F, coughing up more thick yellow and green 

secretions. FiO2 33% with 94% O2 saturations. Had x3 BM yesterday. Cultures sent

for sputum, fungal and blood cultures








prognosis still poor, but improving slowly


Cont ICU





Vitals


Vitals





Vital Signs








  Date Time  Temp Pulse Resp B/P (MAP) Pulse Ox O2 Delivery O2 Flow Rate FiO2


 


7/14/20 13:50   35  99 Tracheal Collar 8.0 


 


7/14/20 13:00  133  131/89 (103)    


 


7/14/20 12:00 98.9       





 98.9       











Physical Exam


Physical Exam


GENERAL: Propped up in bed, awake, weak appearing 


HEENT: Pupils equal, oral cavity dry. NGT out, on vent


NECK:  Tracheostomy 


LUNGS: Diminished aeration bases,  CT on left 


HEART:  S1, S2, regular, tachy 


ABDOMEN: Sightly less distended, bowel sounds hypoactive, soft, richardson x 2, 3 ROBERT

drains, G-J tube and + wound vac 


: [place] in place 


EXTREMITIES: Generalized edema, no cyanosis. SCDs & Podus boots bilaterally, 


SKIN: warm touch. No signs of rash.  


NEURO: awake, mouthing some words, tracking 


LUE-PICC without signs of complications 


LUE art-line out, mottling about old art-line site is improving. RP palpable, 

cap refill brisk.


General:  Cooperative, No acute distress


Heart:  Regular rate (SR/ST), Other (distant heart sounds)


Lungs:  Crackles


Abdomen:  Soft, Other (mulitple drains )


Extremities:  Other (Diffuse edema)


Skin:  No rashes, No significant lesion





Labs


LABS





Laboratory Tests








Test


 7/13/20


18:03 7/14/20


00:50 7/14/20


00:53 7/14/20


05:40


 


Glucose (Fingerstick)


 110 mg/dL


(70-99) 


 120 mg/dL


(70-99) 





 


Random Vancomycin Level  20.6 mcg/mL   


 


Sodium Level


 


 


 


 136 mmol/L


(136-145)


 


Potassium Level


 


 


 


 4.0 mmol/L


(3.5-5.1)


 


Chloride Level


 


 


 


 102 mmol/L


()


 


Carbon Dioxide Level


 


 


 


 28 mmol/L


(21-32)


 


Anion Gap    6 (6-14) 


 


Blood Urea Nitrogen    9 mg/dL (7-20) 


 


Creatinine


 


 


 


 0.5 mg/dL


(0.6-1.0)


 


Estimated GFR


(Cockcroft-Gault) 


 


 


 131.1 





 


Glucose Level


 


 


 


 130 mg/dL


(70-99)


 


Calcium Level


 


 


 


 8.4 mg/dL


(8.5-10.1)


 


Test


 7/14/20


05:50 7/14/20


12:34 


 





 


Glucose (Fingerstick)


 123 mg/dL


(70-99) 148 mg/dL


(70-99) 


 














Assessment and Plan


Assessmemt and Plan


Problems


Medical Problems:


(1) Acute pancreatitis


Status: Acute  





(2) Cholelithiasis


Status: Acute  











Comment


Review of Relevant


I have reviewed the following items josy (where applicable) has been applied.


Labs





Laboratory Tests








Test


 7/12/20


17:58 7/13/20


00:21 7/13/20


05:35 7/13/20


05:37


 


Glucose (Fingerstick)


 106 mg/dL


(70-99) 124 mg/dL


(70-99) 


 110 mg/dL


(70-99)


 


White Blood Count


 


 


 10.5 x10^3/uL


(4.0-11.0) 





 


Red Blood Count


 


 


 2.48 x10^6/uL


(3.50-5.40) 





 


Hemoglobin


 


 


 7.4 g/dL


(12.0-15.5) 





 


Hematocrit


 


 


 21.7 %


(36.0-47.0) 





 


Mean Corpuscular Volume   88 fL ()  


 


Mean Corpuscular Hemoglobin   30 pg (25-35)  


 


Mean Corpuscular Hemoglobin


Concent 


 


 34 g/dL


(31-37) 





 


Red Cell Distribution Width


 


 


 14.9 %


(11.5-14.5) 





 


Platelet Count


 


 


 543 x10^3/uL


(140-400) 





 


Neutrophils (%) (Auto)   83 % (31-73)  


 


Lymphocytes (%) (Auto)   8 % (24-48)  


 


Monocytes (%) (Auto)   8 % (0-9)  


 


Eosinophils (%) (Auto)   2 % (0-3)  


 


Basophils (%) (Auto)   0 % (0-3)  


 


Neutrophils # (Auto)


 


 


 8.7 x10^3/uL


(1.8-7.7) 





 


Lymphocytes # (Auto)


 


 


 0.8 x10^3/uL


(1.0-4.8) 





 


Monocytes # (Auto)


 


 


 0.8 x10^3/uL


(0.0-1.1) 





 


Eosinophils # (Auto)


 


 


 0.2 x10^3/uL


(0.0-0.7) 





 


Basophils # (Auto)


 


 


 0.0 x10^3/uL


(0.0-0.2) 





 


Sodium Level


 


 


 137 mmol/L


(136-145) 





 


Potassium Level


 


 


 4.3 mmol/L


(3.5-5.1) 





 


Chloride Level


 


 


 104 mmol/L


() 





 


Carbon Dioxide Level


 


 


 29 mmol/L


(21-32) 





 


Anion Gap   4 (6-14)  


 


Blood Urea Nitrogen   9 mg/dL (7-20)  


 


Creatinine


 


 


 0.5 mg/dL


(0.6-1.0) 





 


Estimated GFR


(Cockcroft-Gault) 


 


 131.1 


 





 


Glucose Level


 


 


 113 mg/dL


(70-99) 





 


Calcium Level


 


 


 8.5 mg/dL


(8.5-10.1) 





 


Phosphorus Level


 


 


 3.8 mg/dL


(2.6-4.7) 





 


Magnesium Level


 


 


 2.1 mg/dL


(1.8-2.4) 





 


Test


 7/13/20


10:00 7/13/20


18:03 7/14/20


00:50 7/14/20


00:53


 


Vancomycin Level Trough


 25.0 mcg/mL


(10.0-20.0) 


 


 





 


Vancomycin Last Dose Date 07-13-20    


 


Vancomycin Last Dose Time 0230    


 


Glucose (Fingerstick)


 


 110 mg/dL


(70-99) 


 120 mg/dL


(70-99)


 


Random Vancomycin Level   20.6 mcg/mL  


 


Test


 7/14/20


05:40 7/14/20


05:50 7/14/20


12:34 





 


Sodium Level


 136 mmol/L


(136-145) 


 


 





 


Potassium Level


 4.0 mmol/L


(3.5-5.1) 


 


 





 


Chloride Level


 102 mmol/L


() 


 


 





 


Carbon Dioxide Level


 28 mmol/L


(21-32) 


 


 





 


Anion Gap 6 (6-14)    


 


Blood Urea Nitrogen 9 mg/dL (7-20)    


 


Creatinine


 0.5 mg/dL


(0.6-1.0) 


 


 





 


Estimated GFR


(Cockcroft-Gault) 131.1 


 


 


 





 


Glucose Level


 130 mg/dL


(70-99) 


 


 





 


Calcium Level


 8.4 mg/dL


(8.5-10.1) 


 


 





 


Glucose (Fingerstick)


 


 123 mg/dL


(70-99) 148 mg/dL


(70-99) 











Laboratory Tests








Test


 7/13/20


18:03 7/14/20


00:50 7/14/20


00:53 7/14/20


05:40


 


Glucose (Fingerstick)


 110 mg/dL


(70-99) 


 120 mg/dL


(70-99) 





 


Random Vancomycin Level  20.6 mcg/mL   


 


Sodium Level


 


 


 


 136 mmol/L


(136-145)


 


Potassium Level


 


 


 


 4.0 mmol/L


(3.5-5.1)


 


Chloride Level


 


 


 


 102 mmol/L


()


 


Carbon Dioxide Level


 


 


 


 28 mmol/L


(21-32)


 


Anion Gap    6 (6-14) 


 


Blood Urea Nitrogen    9 mg/dL (7-20) 


 


Creatinine


 


 


 


 0.5 mg/dL


(0.6-1.0)


 


Estimated GFR


(Cockcroft-Gault) 


 


 


 131.1 





 


Glucose Level


 


 


 


 130 mg/dL


(70-99)


 


Calcium Level


 


 


 


 8.4 mg/dL


(8.5-10.1)


 


Test


 7/14/20


05:50 7/14/20


12:34 


 





 


Glucose (Fingerstick)


 123 mg/dL


(70-99) 148 mg/dL


(70-99) 


 











Microbiology


7/12/20 Gram Stain Evaluation - Final, Resulted


          


7/12/20 Respiratory Culture - Preliminary, Resulted


          


7/12/20 Blood Culture - Preliminary, Resulted


          NO GROWTH AFTER 2 DAYS


6/30/20 Gram Stain - Final, Complete


          


6/30/20 Aerobic and Anaerobic Culture - Final, Complete


          


6/30/20 Antimicrobic Susceptibility - Final, Complete


          


6/15/20 Gram Stain - Final, Complete


          


6/15/20 Aerobic and Anaerobic Culture - Final, Complete


          


6/7/20 Urine Culture - Final, Complete


         


5/30/20 Gram Stain - Final, Complete


          


5/30/20 Aerobic Culture - Final, Complete


Medications





Current Medications


Sodium Chloride 1,000 ml @  1,000 mls/hr Q1H IV  Last administered on 3/16/20at 

03:00;  Start 3/16/20 at 03:00;  Stop 3/16/20 at 03:59;  Status DC


Ondansetron HCl (Zofran) 4 mg 1X  ONCE IVP  Last administered on 3/16/20at 

03:27;  Start 3/16/20 at 03:00;  Stop 3/16/20 at 03:01;  Status DC


Morphine Sulfate (Morphine Sulfate) 4 mg 1X  ONCE IV ;  Start 3/16/20 at 03:00; 

Stop 3/16/20 at 03:01;  Status Cancel


Ketorolac Tromethamine (Toradol 30mg Vial) 30 mg 1X  ONCE IV  Last administered 

on 3/16/20at 02:54;  Start 3/16/20 at 03:00;  Stop 3/16/20 at 03:01;  Status DC


Fentanyl Citrate (Fentanyl 2ml Vial) 25 mcg 1X  ONCE IVP  Last administered on 

3/16/20at 03:23;  Start 3/16/20 at 03:30;  Stop 3/16/20 at 03:31;  Status DC


Fentanyl Citrate (Fentanyl 2ml Vial) 100 mcg STK-MED ONCE .ROUTE ;  Start 

3/16/20 at 03:18;  Stop 3/16/20 at 03:18;  Status DC


Iohexol (Omnipaque 350 Mg/ml) 90 ml 1X  ONCE IV  Last administered on 3/16/20at 

03:25;  Start 3/16/20 at 03:30;  Stop 3/16/20 at 03:31;  Status DC


Info (CONTRAST GIVEN -- Rx MONITORING) 1 each PRN DAILY  PRN MC SEE COMMENTS;  

Start 3/16/20 at 03:30;  Stop 3/18/20 at 03:29;  Status DC


Hydromorphone HCl (Dilaudid) 0.5 mg 1X  ONCE IV  Last administered on 3/16/20at 

03:55;  Start 3/16/20 at 04:30;  Stop 3/16/20 at 04:32;  Status DC


Ondansetron HCl (Zofran) 4 mg PRN Q8HRS  PRN IV NAUSEA/VOMITING 1ST CHOICE;  

Start 3/16/20 at 05:00;  Stop 3/16/20 at 09:27;  Status DC


Morphine Sulfate (Morphine Sulfate) 2 mg PRN Q2HR  PRN IV SEVERE PAIN 7-10 Last 

administered on 3/17/20at 12:26;  Start 3/16/20 at 05:00;  Stop 3/17/20 at 

14:15;  Status DC


Sodium Chloride 1,000 ml @  125 mls/hr Q8H IV  Last administered on 3/16/20at 

20:56;  Start 3/16/20 at 05:00;  Stop 3/17/20 at 04:59;  Status DC


Hydromorphone HCl (Dilaudid) 0.5 mg PRN Q3HRS  PRN IV SEVERE PAIN 7-10 Last 

administered on 3/17/20at 10:06;  Start 3/16/20 at 05:00;  Stop 3/17/20 at 12:01

;  Status DC


Piperacillin Sod/ Tazobactam Sod 4.5 gm/Sodium Chloride 100 ml @  200 mls/hr 1X 

ONCE IV  Last administered on 3/16/20at 05:44;  Start 3/16/20 at 06:00;  Stop 

3/16/20 at 06:29;  Status DC


Ondansetron HCl (Zofran) 4 mg PRN Q4HRS  PRN IV NAUSEA/VOMITING 1ST CHOICE Last 

administered on 7/13/20at 17:18;  Start 3/16/20 at 09:30


Insulin Human Lispro (HumaLOG) 0-9 UNITS Q6HRS SQ  Last administered on 

7/12/20at 12:43;  Start 3/16/20 at 09:30


Dextrose (Dextrose 50%-Water Syringe) 12.5 gm PRN Q15MIN  PRN IV SEE COMMENTS;  

Start 3/16/20 at 09:30


Pantoprazole Sodium (PROTONIX VIAL for IV PUSH) 40 mg DAILYAC IVP  Last 

administered on 7/14/20at 09:01;  Start 3/16/20 at 11:30


Prochlorperazine Edisylate (Compazine) 10 mg PRN Q6HRS  PRN IV NAUSEA/VOMITING, 

2nd CHOICE Last administered on 7/13/20at 20:17;  Start 3/16/20 at 17:45


Atenolol (Tenormin) 100 mg DAILY PO ;  Start 3/17/20 at 09:00;  Stop 3/16/20 at 

20:08;  Status DC


Metoprolol Tartrate (Lopressor Vial) 2.5 mg Q6HRS IVP  Last administered on 

3/17/20at 05:51;  Start 3/16/20 at 20:15;  Stop 3/17/20 at 10:02;  Status DC


Metoprolol Tartrate (Lopressor Vial) 5 mg Q6HRS IVP  Last administered on 

3/26/20at 00:12;  Start 3/17/20 at 10:15;  Stop 3/28/20 at 08:48;  Status DC


Hydromorphone HCl (Dilaudid) 1 mg PRN Q3HRS  PRN IV SEVERE PAIN 7-10 Last 

administered on 3/23/20at 05:13;  Start 3/17/20 at 12:00;  Stop 3/31/20 at 

00:25;  Status DC


Lidocaine HCl (Buffered Lidocaine 1%) 3 ml STK-MED ONCE .ROUTE ;  Start 3/17/20 

at 12:55;  Stop 3/17/20 at 12:56;  Status DC


Albumin Human 500 ml @  125 mls/hr 1X  ONCE IV  Last administered on 3/17/20at 

14:33;  Start 3/17/20 at 14:30;  Stop 3/17/20 at 18:32;  Status DC


Norepinephrine Bitartrate 8 mg/ Dextrose 258 ml @  17.299 mls/ hr CONT  PRN IV 

PER PROTOCOL Last administered on 4/14/20at 12:48;  Start 3/17/20 at 15:30;  

Stop 4/17/20 at 09:19;  Status DC


Sodium Chloride 1,000 ml @  125 mls/hr Q8H IV  Last administered on 3/17/20at 

21:04;  Start 3/17/20 at 16:00;  Stop 3/18/20 at 02:42;  Status DC


Albumin Human 500 ml @  125 mls/hr PRN BID  PRN IV After every 2L NSS & BP < 

90mm Last administered on 6/30/20at 16:06;  Start 3/17/20 at 16:00;  Stop 7/3/20

at 09:30;  Status DC


Iohexol (Omnipaque 300 Mg/ml) 60 ml 1X  ONCE IV  Last administered on 3/17/20at 

17:20;  Start 3/17/20 at 17:00;  Stop 3/17/20 at 17:01;  Status DC


Info (CONTRAST GIVEN -- Rx MONITORING) 1 each PRN DAILY  PRN MC SEE COMMENTS;  

Start 3/17/20 at 17:00;  Stop 3/19/20 at 16:59;  Status DC


Meropenem 1 gm/ Sodium Chloride 100 ml @  200 mls/hr Q8HRS IV  Last administered

on 3/18/20at 05:45;  Start 3/17/20 at 20:00;  Stop 3/18/20 at 08:48;  Status DC


Furosemide (Lasix) 40 mg 1X  ONCE IVP  Last administered on 3/17/20at 22:12;  

Start 3/17/20 at 22:30;  Stop 3/17/20 at 22:31;  Status DC


Calcium Chloride 1000 mg/Sodium Chloride 110 ml @  220 mls/hr 1X  ONCE IV  Last 

administered on 3/17/20at 22:11;  Start 3/17/20 at 22:30;  Stop 3/17/20 at 

22:59;  Status DC


Albuterol Sulfate (Ventolin Neb Soln) 2.5 mg 1X  ONCE NEB  Last administered on 

3/18/20at 00:56;  Start 3/17/20 at 22:30;  Stop 3/17/20 at 22:31;  Status DC


Insulin Human Regular (HumuLIN R VIAL) 5 unit 1X  ONCE IV  Last administered on 

3/17/20at 22:14;  Start 3/17/20 at 22:30;  Stop 3/17/20 at 22:31;  Status DC


Magnesium Sulfate 50 ml @ 25 mls/hr 1X  ONCE IV  Last administered on 3/18/20at 

02:57;  Start 3/18/20 at 03:00;  Stop 3/18/20 at 04:59;  Status DC


Calcium Gluconate 1000 mg/Sodium Chloride 110 ml @  220 mls/hr 1X  ONCE IV  Last

administered on 3/18/20at 02:46;  Start 3/18/20 at 03:00;  Stop 3/18/20 at 

03:29;  Status DC


Sodium Chloride 1,000 ml @  200 mls/hr Q5H IV  Last administered on 3/18/20at 

02:46;  Start 3/18/20 at 03:00;  Stop 3/18/20 at 10:21;  Status DC


Calcium Gluconate 1000 mg/Sodium Chloride 110 ml @  220 mls/hr 1X  ONCE IV  Last

administered on 3/18/20at 03:21;  Start 3/18/20 at 03:30;  Stop 3/18/20 at 

03:59;  Status DC


Sodium Bicarbonate 50 meq/Sodium Chloride 1,050 ml @  75 mls/hr Q14H IV  Last 

administered on 3/22/20at 21:10;  Start 3/18/20 at 07:30;  Stop 3/23/20 at 

10:28;  Status DC


Calcium Gluconate 2000 mg/Sodium Chloride 120 ml @  220 mls/hr 1X  ONCE IV  Last

administered on 3/18/20at 09:05;  Start 3/18/20 at 07:30;  Stop 3/18/20 at 

08:02;  Status DC


Lidocaine HCl (Xylocaine-Mpf 1% 2ml Vial) 2 ml STK-MED ONCE .ROUTE ;  Start 

3/18/20 at 08:47;  Stop 3/18/20 at 08:47;  Status DC


Meropenem 500 mg/ Sodium Chloride 50 ml @  100 mls/hr Q12HR IV  Last 

administered on 3/23/20at 21:01;  Start 3/18/20 at 18:00;  Stop 3/24/20 at 

07:58;  Status DC


Lidocaine HCl (Buffered Lidocaine 1%) 3 ml STK-MED ONCE .ROUTE ;  Start 3/18/20 

at 09:46;  Stop 3/18/20 at 09:46;  Status DC


Lidocaine HCl (Buffered Lidocaine 1%) 6 ml 1X  ONCE INJ  Last administered on 

3/18/20at 10:26;  Start 3/18/20 at 10:15;  Stop 3/18/20 at 10:16;  Status DC


Info (Tpn Per Pharmacy) 1 each PRN DAILY  PRN MC SEE COMMENTS Last administered 

on 7/14/20at 10:47;  Start 3/18/20 at 12:00


Sodium Chloride 1,000 ml @  1,000 mls/hr Q1H PRN IV hypotension;  Start 3/18/20 

at 12:07;  Stop 3/18/20 at 18:06;  Status DC


Diphenhydramine HCl (Benadryl) 25 mg 1X PRN  PRN IV ITCHING;  Start 3/18/20 at 

12:15;  Stop 3/19/20 at 12:14;  Status DC


Diphenhydramine HCl (Benadryl) 25 mg 1X PRN  PRN IV ITCHING;  Start 3/18/20 at 

12:15;  Stop 3/19/20 at 12:14;  Status DC


Sodium Chloride 1,000 ml @  400 mls/hr Q2H30M PRN IV PATENCY;  Start 3/18/20 at 

12:07;  Stop 3/19/20 at 00:06;  Status DC


Info (PHARMACY MONITORING -- do not chart) 1 each PRN DAILY  PRN MC SEE COM

MENTS;  Start 3/18/20 at 12:15;  Stop 3/20/20 at 08:13;  Status DC


Sodium Chloride 90 meq/Calcium Gluconate 10 meq/ Multivitamins 10 ml/Chromium/ 

Copper/Manganese/ Seleni/Zn 1 ml/ Total Parenteral Nutrition/Amino 

Acids/Dextrose/ Fat Emulsion Intravenous 55.005 ml  @ 2.292 mls/hr TPN  CONT IV 

;  Start 3/18/20 at 22:00;  Stop 3/18/20 at 12:33;  Status DC


Info (Tpn Per Pharmacy) 1 each PRN DAILY  PRN MC SEE COMMENTS;  Start 3/18/20 at

12:30;  Status UNV


Sodium Chloride 90 meq/Calcium Gluconate 10 meq/ Multivitamins 10 ml/Chromium/ 

Copper/Manganese/ Seleni/Zn 0.5 ml/ Total Parenteral Nutrition/Amino 

Acids/Dextrose/ Fat Emulsion Intravenous 1,512 ml @  63 mls/hr TPN  CONT IV  

Last administered on 3/18/20at 22:06;  Start 3/18/20 at 22:00;  Stop 3/19/20 at 

21:59;  Status DC


Calcium Carbonate/ Glycine (Tums) 500 mg PRN AFTMEALHC  PRN PO INDIGESTION;  

Start 3/18/20 at 17:45;  Stop 5/13/20 at 10:25;  Status DC


Calcium Gluconate (Calcium Gluconate) 2,000 mg 1X  ONCE IVP  Last administered 

on 3/19/20at 02:19;  Start 3/19/20 at 02:15;  Stop 3/19/20 at 02:16;  Status DC


Calcium Chloride 3000 mg/Sodium Chloride 1,030 ml @  50 mls/hr [id number] IV  Last 

administered on 3/21/20at 02:17;  Start 3/19/20 at 08:00;  Stop 3/21/20 at 

15:23;  Status DC


Lorazepam (Ativan Inj) 1 mg PRN Q4HRS  PRN IVP ANXIETY / AGITATION, 2nd choic 

Last administered on 4/17/20at 03:51;  Start 3/19/20 at 09:00;  Stop 4/17/20 at 

09:19;  Status DC


Sodium Chloride 1,000 ml @  1,000 mls/hr Q1H PRN IV hypotension;  Start 3/19/20 

at 08:56;  Stop 3/19/20 at 14:55;  Status DC


Albumin Human 200 ml @  200 mls/hr 1X PRN  PRN IV Hypotension;  Start 3/19/20 at

09:00;  Stop 3/19/20 at 14:59;  Status DC


Diphenhydramine HCl (Benadryl) 25 mg 1X PRN  PRN IV ITCHING;  Start 3/19/20 at 

09:00;  Stop 3/20/20 at 08:59;  Status DC


Diphenhydramine HCl (Benadryl) 25 mg 1X PRN  PRN IV ITCHING;  Start 3/19/20 at 

09:00;  Stop 3/20/20 at 08:59;  Status DC


Sodium Chloride 1,000 ml @  400 mls/hr Q2H30M PRN IV PATENCY;  Start 3/19/20 at 

08:56;  Stop 3/19/20 at 20:55;  Status DC


Info (PHARMACY MONITORING -- do not chart) 1 each PRN DAILY  PRN MC SEE 

COMMENTS;  Start 3/19/20 at 09:00;  Status UNV


Info (PHARMACY MONITORING -- do not chart) 1 each PRN DAILY  PRN MC SEE 

COMMENTS;  Start 3/19/20 at 09:00;  Stop 3/20/20 at 08:13;  Status DC


Digoxin (Lanoxin) 500 mcg 1X  ONCE IV  Last administered on 3/19/20at 10:04;  

Start 3/19/20 at 10:00;  Stop 3/19/20 at 10:01;  Status DC


Digoxin (Lanoxin) 125 mcg 1X  ONCE IV  Last administered on 3/19/20at 17:10;  

Start 3/19/20 at 18:00;  Stop 3/19/20 at 18:01;  Status DC


Magnesium Sulfate 100 ml @  25 mls/hr 1X  ONCE IV  Last administered on 

3/19/20at 12:48;  Start 3/19/20 at 13:00;  Stop 3/19/20 at 16:59;  Status DC


Sodium Chloride 90 meq/Magnesium Sulfate 10 meq/ Calcium Gluconate 20 meq/ 

Multivitamins 10 ml/Chromium/ Copper/Manganese/ Seleni/Zn 0.5 ml/ Total 

Parenteral Nutrition/Amino Acids/Dextrose/ Fat Emulsion Intravenous 1,512 ml @  

63 mls/hr TPN  CONT IV  Last administered on 3/19/20at 22:25;  Start 3/19/20 at 

22:00;  Stop 3/20/20 at 21:59;  Status DC


Sodium Chloride 1,000 ml @  1,000 mls/hr Q1H PRN IV hypotension;  Start 3/20/20 

at 08:05;  Stop 3/20/20 at 14:04;  Status DC


Albumin Human 200 ml @  200 mls/hr 1X  ONCE IV  Last administered on 3/20/20at 

08:57;  Start 3/20/20 at 08:15;  Stop 3/20/20 at 09:14;  Status DC


Diphenhydramine HCl (Benadryl) 25 mg 1X PRN  PRN IV ITCHING;  Start 3/20/20 at 

08:15;  Stop 3/21/20 at 08:14;  Status DC


Diphenhydramine HCl (Benadryl) 25 mg 1X PRN  PRN IV ITCHING;  Start 3/20/20 at 

08:15;  Stop 3/21/20 at 08:14;  Status DC


Sodium Chloride 1,000 ml @  400 mls/hr Q2H30M PRN IV PATENCY;  Start 3/20/20 at 

08:05;  Stop 3/20/20 at 20:04;  Status DC


Info (PHARMACY MONITORING -- do not chart) 1 each PRN DAILY  PRN MC SEE 

COMMENTS;  Start 3/20/20 at 08:15;  Stop 3/24/20 at 07:57;  Status DC


Sodium Chloride 90 meq/Potassium Chloride 15 meq/ Potassium Phosphate 10 mmol/ 

Magnesium Sulfate 10 meq/Calcium Gluconate 20 meq/ Multivitamins 10 ml/Chromium/

Copper/Manganese/ Seleni/Zn 0.5 ml/ Total Parenteral Nutrition/Amino 

Acids/Dextrose/ Fat Emulsion Intravenous 1,512 ml @  63 mls/hr TPN  CONT IV  

Last administered on 3/20/20at 21:01;  Start 3/20/20 at 22:00;  Stop 3/21/20 at 

21:59;  Status DC


Potassium Chloride/Water 100 ml @  100 mls/hr 1X  ONCE IV  Last administered on 

3/20/20at 14:09;  Start 3/20/20 at 14:00;  Stop 3/20/20 at 14:59;  Status DC


Benzocaine (Hurricaine One) 1 spray 1X  ONCE MM  Last administered on 3/20/20at 

16:38;  Start 3/20/20 at 14:30;  Stop 3/20/20 at 14:31;  Status DC


Lidocaine HCl (Glydo (Lidocaine) Jelly) 1 thomas 1X  ONCE MM  Last administered on 

3/20/20at 16:38;  Start 3/20/20 at 14:30;  Stop 3/20/20 at 14:31;  Status DC


Linezolid/Dextrose 300 ml @  300 mls/hr Q12HR IV  Last administered on 3/26/20at

21:04;  Start 3/20/20 at 20:00;  Stop 3/27/20 at 07:50;  Status DC


Acetaminophen (Tylenol) 650 mg PRN Q6HRS  PRN PO MILD PAIN / TEMP;  Start 

3/21/20 at 03:30;  Stop 3/21/20 at 03:36;  Status DC


Acetaminophen (Tylenol) 650 mg PRN Q6HRS  PRN PEG MILD PAIN / TEMP Last 

administered on 4/16/20at 19:56;  Start 3/21/20 at 03:36;  Stop 5/13/20 at 

10:25;  Status DC


Sodium Chloride 1,000 ml @  1,000 mls/hr Q1H PRN IV hypotension;  Start 3/21/20 

at 07:50;  Stop 3/21/20 at 13:49;  Status DC


Albumin Human 200 ml @  200 mls/hr 1X PRN  PRN IV Hypotension;  Start 3/21/20 at

08:00;  Stop 3/21/20 at 13:59;  Status DC


Sodium Chloride (Normal Saline Flush) 10 ml 1X PRN  PRN IV AP catheter pack;  

Start 3/21/20 at 08:00;  Stop 3/22/20 at 07:59;  Status DC


Sodium Chloride (Normal Saline Flush) 10 ml 1X PRN  PRN IV  catheter pack;  

Start 3/21/20 at 08:00;  Stop 3/22/20 at 07:59;  Status DC


Sodium Chloride 1,000 ml @  400 mls/hr Q2H30M PRN IV PATENCY;  Start 3/21/20 at 

07:50;  Stop 3/21/20 at 19:49;  Status DC


Info (PHARMACY MONITORING -- do not chart) 1 each PRN DAILY  PRN MC SEE 

COMMENTS;  Start 3/21/20 at 08:00;  Status UNV


Info (PHARMACY MONITORING -- do not chart) 1 each PRN DAILY  PRN MC SEE 

COMMENTS;  Start 3/21/20 at 08:00;  Stop 3/23/20 at 08:25;  Status DC


Sodium Chloride 90 meq/Potassium Chloride 15 meq/ Potassium Phosphate 10 mmol/ 

Magnesium Sulfate 10 meq/Calcium Gluconate 20 meq/ Multivitamins 10 ml/Chromium/

Copper/Manganese/ Seleni/Zn 0.5 ml/ Total Parenteral Nutrition/Amino 

Acids/Dextrose/ Fat Emulsion Intravenous 1,512 ml @  63 mls/hr TPN  CONT IV  

Last administered on 3/21/20at 20:57;  Start 3/21/20 at 22:00;  Stop 3/22/20 at 

21:59;  Status DC


Sodium Chloride 90 meq/Potassium Chloride 15 meq/ Potassium Phosphate 15 mmol/ 

Magnesium Sulfate 10 meq/Calcium Gluconate 20 meq/ Multivitamins 10 ml/Chromium/

Copper/Manganese/ Seleni/Zn 0.5 ml/ Total Parenteral Nutrition/Amino 

Acids/Dextrose/ Fat Emulsion Intravenous 1,512 ml @  63 mls/hr TPN  CONT IV ;  

Start 3/22/20 at 22:00;  Stop 3/22/20 at 14:16;  Status DC


Sodium Chloride 90 meq/Potassium Chloride 15 meq/ Potassium Phosphate 15 mmol/ 

Magnesium Sulfate 10 meq/Calcium Gluconate 20 meq/ Multivitamins 10 ml/Chromium/

Copper/Manganese/ Seleni/Zn 0.5 ml/ Total Parenteral Nutrition/Amino 

Acids/Dextrose/ Fat Emulsion Intravenous 1,200 ml @  50 mls/hr TPN  CONT IV ;  

Start 3/22/20 at 22:00;  Stop 3/22/20 at 14:17;  Status DC


Sodium Chloride 90 meq/Potassium Chloride 15 meq/ Potassium Phosphate 10 mmol/ 

Magnesium Sulfate 10 meq/Calcium Gluconate 20 meq/ Multivitamins 10 ml/Chromium/

Copper/Manganese/ Seleni/Zn 0.5 ml/ Total Parenteral Nutrition/Amino Aci

ds/Dextrose/ Fat Emulsion Intravenous 1,200 ml @  50 mls/hr TPN  CONT IV  Last 

administered on 3/22/20at 23:29;  Start 3/22/20 at 22:00;  Stop 3/23/20 at 

21:59;  Status DC


Sodium Chloride 1,000 ml @  1,000 mls/hr Q1H PRN IV hypotension;  Start 3/23/20 

at 07:28;  Stop 3/23/20 at 13:27;  Status DC


Albumin Human 200 ml @  200 mls/hr 1X  ONCE IV  Last administered on 3/23/20at 

08:51;  Start 3/23/20 at 07:30;  Stop 3/23/20 at 08:29;  Status DC


Diphenhydramine HCl (Benadryl) 25 mg 1X PRN  PRN IV ITCHING;  Start 3/23/20 at 

07:30;  Stop 3/24/20 at 07:29;  Status DC


Diphenhydramine HCl (Benadryl) 25 mg 1X PRN  PRN IV ITCHING;  Start 3/23/20 at 

07:30;  Stop 3/24/20 at 07:29;  Status DC


Sodium Chloride 1,000 ml @  400 mls/hr Q2H30M PRN IV PATENCY;  Start 3/23/20 at 

07:28;  Stop 3/23/20 at 19:27;  Status DC


Info (PHARMACY MONITORING -- do not chart) 1 each PRN DAILY  PRN MC SEE 

COMMENTS;  Start 3/23/20 at 07:30;  Stop 4/3/20 at 13:01;  Status DC


Metronidazole 100 ml @  100 mls/hr Q6HRS IV  Last administered on 4/8/20at 0

6:26;  Start 3/23/20 at 08:30;  Stop 4/8/20 at 09:58;  Status DC


Micafungin Sodium 100 mg/Dextrose 100 ml @  100 mls/hr Q24H IV  Last 

administered on 4/30/20at 08:18;  Start 3/23/20 at 09:00;  Stop 4/30/20 at 

20:58;  Status DC


Propofol 0 ml @ As Directed STK-MED ONCE IV ;  Start 3/23/20 at 07:53;  Stop 

3/23/20 at 07:53;  Status DC


Etomidate (Amidate) 20 mg STK-MED ONCE IV ;  Start 3/23/20 at 07:53;  Stop 

3/23/20 at 07:54;  Status DC


Midazolam HCl (Versed) 5 mg STK-MED ONCE .ROUTE ;  Start 3/23/20 at 07:57;  Stop

3/23/20 at 07:57;  Status DC


Fentanyl Citrate 30 ml @ 0 mls/hr CONT  PRN IV SEE PROTOCOL Last administered on

4/17/20at 06:12;  Start 3/23/20 at 08:15;  Stop 4/17/20 at 09:19;  Status DC


Artificial Tears (Artificial Tears) 1 drop PRN Q1HR  PRN OU DRY EYE, 1st choice;

 Start 3/23/20 at 08:15;  Stop 4/29/20 at 05:31;  Status DC


Midazolam HCl 50 mg/Sodium Chloride 50 ml @ 0 mls/hr CONT  PRN IV SEE PROTOCOL 

Last administered on 3/26/20at 22:39;  Start 3/23/20 at 08:15;  Stop 3/28/20 at 

15:59;  Status DC


Etomidate (Amidate) 8 mg 1X  ONCE IV  Last administered on 3/23/20at 08:33;  

Start 3/23/20 at 08:30;  Stop 3/23/20 at 08:31;  Status DC


Succinylcholine Chloride (Anectine) 120 mg 1X  ONCE IV  Last administered on 

3/23/20at 08:34;  Start 3/23/20 at 08:30;  Stop 3/23/20 at 08:31;  Status DC


Midazolam HCl (Versed) 5 mg 1X  ONCE IV ;  Start 3/23/20 at 08:30;  Stop 3/23/20

at 08:31;  Status DC


Potassium Chloride 15 meq/ Bicarbonate Dialysis Soln w/ out KCl 5,007.5 ml  @ 

1,000 mls/ hr Q5H1M IV  Last administered on 3/24/20at 11:11;  Start 3/23/20 at 

12:00;  Stop 3/24/20 at 11:15;  Status DC


Potassium Chloride 15 meq/ Bicarbonate Dialysis Soln w/ out KCl 5,007.5 ml  @ 

1,000 mls/ hr Q5H1M IV  Last administered on 3/24/20at 11:12;  Start 3/23/20 at 

12:00;  Stop 3/24/20 at 11:17;  Status DC


Potassium Chloride 15 meq/ Bicarbonate Dialysis Soln w/ out KCl 5,007.5 ml  @ 

1,000 mls/ hr Q5H1M IV  Last administered on 3/24/20at 11:11;  Start 3/23/20 at 

12:00;  Stop 3/24/20 at 11:19;  Status DC


Sodium Chloride 90 meq/Potassium Chloride 15 meq/ Potassium Phosphate 10 mmol/ 

Magnesium Sulfate 10 meq/Calcium Gluconate 20 meq/ Multivitamins 10 ml/Chromium/

Copper/Manganese/ Seleni/Zn 0.5 ml/ Total Parenteral Nutrition/Amino 

Acids/Dextrose/ Fat Emulsion Intravenous 1,400 ml @  58.333 mls/ hr TPN  CONT IV

 Last administered on 3/23/20at 21:42;  Start 3/23/20 at 22:00;  Stop 3/24/20 at

21:59;  Status DC


Heparin Sodium (Porcine) (Heparin Sodium) 5,000 unit Q8HRS SQ  Last administered

on 3/28/20at 05:55;  Start 3/23/20 at 15:00;  Stop 3/28/20 at 13:28;  Status DC


Meropenem 500 mg/ Sodium Chloride 50 ml @  100 mls/hr Q6HRS IV  Last 

administered on 3/25/20at 06:00;  Start 3/24/20 at 09:00;  Stop 3/25/20 at 

07:29;  Status DC


Potassium Phosphate 20 mmol/ Sodium Chloride 106.6667 ml @  51.667 m... 1X  ONCE

IV  Last administered on 3/24/20at 11:22;  Start 3/24/20 at 10:15;  Stop 3/24/20

at 12:18;  Status DC


Acetaminophen (Tylenol Supp) 650 mg PRN Q6HRS  PRN LA MILD PAIN / TEMP > 100.3'F

Last administered on 7/13/20at 20:17;  Start 3/24/20 at 10:30


Potassium Chloride/Water 100 ml @  100 mls/hr Q1H IV  Last administered on 

3/24/20at 12:12;  Start 3/24/20 at 11:00;  Stop 3/24/20 at 12:59;  Status DC


Potassium Chloride 20 meq/ Bicarbonate Dialysis Soln w/ out KCl 5,010 ml @  

1,000 mls/hr Q5H1M IV  Last administered on 3/25/20at 08:48;  Start 3/24/20 at 

12:00;  Stop 3/25/20 at 13:03;  Status DC


Potassium Chloride 20 meq/ Bicarbonate Dialysis Soln w/ out KCl 5,010 ml @  

1,000 mls/hr Q5H1M IV  Last administered on 3/29/20at 14:52;  Start 3/24/20 at 

11:30;  Stop 3/29/20 at 19:59;  Status DC


Potassium Chloride 20 meq/ Bicarbonate Dialysis Soln w/ out KCl 5,010 ml @  

1,000 mls/hr Q5H1M IV  Last administered on 3/29/20at 14:53;  Start 3/24/20 at 

11:30;  Stop 3/29/20 at 19:59;  Status DC


Sodium Chloride 90 meq/Potassium Chloride 15 meq/ Potassium Phosphate 15 mmol/ 

Magnesium Sulfate 10 meq/Calcium Gluconate 15 meq/ Multivitamins 10 ml/Chromium/

Copper/Manganese/ Seleni/Zn 0.5 ml/ Total Parenteral Nutrition/Amino 

Acids/Dextrose/ Fat Emulsion Intravenous 1,400 ml @  58.333 mls/ hr TPN  CONT IV

 Last administered on 3/24/20at 22:17;  Start 3/24/20 at 22:00;  Stop 3/25/20 at

21:59;  Status DC


Cefepime HCl (Maxipime) 2 gm Q12HR IVP  Last administered on 4/7/20at 20:56;  

Start 3/25/20 at 09:00;  Stop 4/8/20 at 09:58;  Status DC


Daptomycin 500 mg/ Sodium Chloride 50 ml @  100 mls/hr Q48H IV  Last 

administered on 4/10/20at 09:57;  Start 3/25/20 at 08:30;  Stop 4/10/20 at 

10:07;  Status DC


Lidocaine HCl (Buffered Lidocaine 1%) 3 ml 1X  ONCE INJ  Last administered on 

3/25/20at 10:27;  Start 3/25/20 at 10:30;  Stop 3/25/20 at 10:31;  Status DC


Potassium Phosphate 20 mmol/ Sodium Chloride 106.6667 ml @  51.667 m... 1X  ONCE

IV  Last administered on 3/25/20at 12:51;  Start 3/25/20 at 13:00;  Stop 3/25/20

at 15:03;  Status DC


Sodium Chloride 90 meq/Potassium Chloride 15 meq/ Potassium Phosphate 18 mmol/ 

Magnesium Sulfate 8 meq/Calcium Gluconate 15 meq/ Multivitamins 10 ml/Chromium/ 

Copper/Manganese/ Seleni/Zn 0.5 ml/ Total Parenteral Nutrition/Amino 

Acids/Dextrose/ Fat Emulsion Intravenous 1,400 ml @  58.333 mls/ hr TPN  CONT IV

 Last administered on 3/25/20at 22:16;  Start 3/25/20 at 22:00;  Stop 3/26/20 at

21:59;  Status DC


Potassium Chloride 20 meq/ Bicarbonate Dialysis Soln w/ out KCl 5,010 ml @  

1,000 mls/hr Q5H1M IV  Last administered on 3/29/20at 14:54;  Start 3/25/20 at 

16:00;  Stop 3/29/20 at 19:59;  Status DC


Multi-Ingred Cream/Lotion/Oil/ Oint (Artificial Tears Eye Ointment) 1 thomas PRN 

Q1HR  PRN OU DRY EYE, 2nd choice Last administered on 4/13/20at 08:19;  Start 

3/25/20 at 17:30;  Stop 6/3/20 at 14:39;  Status DC


Sodium Chloride 90 meq/Potassium Chloride 15 meq/ Potassium Phosphate 18 mmol/ 

Magnesium Sulfate 8 meq/Calcium Gluconate 15 meq/ Multivitamins 10 ml/Chromium/ 

Copper/Manganese/ Seleni/Zn 0.5 ml/ Total Parenteral Nutrition/Amino 

Acids/Dextrose/ Fat Emulsion Intravenous 1,400 ml @  58.333 mls/ hr TPN  CONT IV

 Last administered on 3/26/20at 22:00;  Start 3/26/20 at 22:00;  Stop 3/27/20 at

21:59;  Status DC


Albumin Human 500 ml @  125 mls/hr 1X  ONCE IV ;  Start 3/26/20 at 14:15;  Stop 

3/26/20 at 18:14;  Status DC


Sodium Chloride 90 meq/Potassium Chloride 15 meq/ Potassium Phosphate 18 mmol/ 

Magnesium Sulfate 8 meq/Calcium Gluconate 15 meq/ Multivitamins 10 ml/Chromium/ 

Copper/Manganese/ Seleni/Zn 0.5 ml/ Insulin Human Regular 10 unit/ Total 

Parenteral Nutrition/Amino Acids/Dextrose/ Fat Emulsion Intravenous 1,400 ml @  

58.333 mls/ hr TPN  CONT IV  Last administered on 3/27/20at 21:43;  Start 

3/27/20 at 22:00;  Stop 3/28/20 at 21:59;  Status DC


Lidocaine HCl (Buffered Lidocaine 1%) 3 ml STK-MED ONCE .ROUTE ;  Start 3/25/20 

at 10:00;  Stop 3/27/20 at 13:57;  Status DC


Midazolam HCl 100 mg/Sodium Chloride 100 ml @ 7 mls/hr CONT  PRN IV SEE PROTOCOL

Last administered on 4/8/20at 15:35;  Start 3/28/20 at 16:00;  Stop 6/3/20 at 

14:38;  Status DC


Sodium Chloride 90 meq/Potassium Chloride 15 meq/ Potassium Phosphate 18 mmol/ 

Magnesium Sulfate 8 meq/Calcium Gluconate 15 meq/ Multivitamins 10 ml/Chromium/ 

Copper/Manganese/ Seleni/Zn 0.5 ml/ Insulin Human Regular 15 unit/ Total Pare

nteral Nutrition/Amino Acids/Dextrose/ Fat Emulsion Intravenous 1,400 ml @  

58.333 mls/ hr TPN  CONT IV  Last administered on 3/28/20at 20:34;  Start 

3/28/20 at 22:00;  Stop 3/29/20 at 21:59;  Status DC


Info ([facility] Electrolyte Protocol) 1 ea CONT PRN  PRN MC PER PROTOCOL;  Start 

3/29/20 at 13:15


Sodium Chloride 90 meq/Potassium Chloride 15 meq/ Potassium Phosphate 18 mmol/ 

Magnesium Sulfate 8 meq/Calcium Gluconate 15 meq/ Multivitamins 10 ml/Chromium/ 

Copper/Manganese/ Seleni/Zn 0.5 ml/ Insulin Human Regular 15 unit/ Total 

Parenteral Nutrition/Amino Acids/Dextrose/ Fat Emulsion Intravenous 1,400 ml @  

58.333 mls/ hr TPN  CONT IV  Last administered on 3/29/20at 22:05;  Start 

3/29/20 at 22:00;  Stop 3/30/20 at 21:59;  Status DC


Potassium Chloride 15 meq/ Bicarbonate Dialysis Soln w/ out KCl 5,007.5 ml  @ 

1,000 mls/ hr Q5H1M IV  Last administered on 4/1/20at 18:14;  Start 3/29/20 at 

20:00;  Stop 4/2/20 at 13:08;  Status DC


Potassium Chloride 15 meq/ Bicarbonate Dialysis Soln w/ out KCl 5,007.5 ml  @ 

1,000 mls/ hr Q5H1M IV  Last administered on 4/1/20at 18:14;  Start 3/29/20 at 

20:00;  Stop 4/2/20 at 13:08;  Status DC


Potassium Chloride 15 meq/ Bicarbonate Dialysis Soln w/ out KCl 5,007.5 ml  @ 

1,000 mls/ hr Q5H1M IV  Last administered on 4/1/20at 18:14;  Start 3/29/20 at 

20:00;  Stop 4/2/20 at 13:08;  Status DC


Iohexol (Omnipaque 240 Mg/ml) 30 ml 1X  ONCE PO  Last administered on 3/30/20at 

11:30;  Start 3/30/20 at 11:30;  Stop 3/30/20 at 11:33;  Status DC


Info (CONTRAST GIVEN -- Rx MONITORING) 1 each PRN DAILY  PRN MC SEE COMMENTS;  

Start 3/30/20 at 11:45;  Stop 4/1/20 at 11:44;  Status DC


Sodium Chloride 90 meq/Potassium Chloride 15 meq/ Potassium Phosphate 18 mmol/ 

Magnesium Sulfate 8 meq/Calcium Gluconate 15 meq/ Multivitamins 10 ml/Chromium/ 

Copper/Manganese/ Seleni/Zn 0.5 ml/ Insulin Human Regular 15 unit/ Total 

Parenteral Nutrition/Amino Acids/Dextrose/ Fat Emulsion Intravenous 1,400 ml @  

58.333 mls/ hr TPN  CONT IV  Last administered on 3/30/20at 21:47;  Start 

3/30/20 at 22:00;  Stop 3/31/20 at 21:59;  Status DC


Sodium Chloride 90 meq/Potassium Chloride 15 meq/ Potassium Phosphate 18 mmol/ 

Magnesium Sulfate 8 meq/Calcium Gluconate 15 meq/ Multivitamins 10 ml/Chromium/ 

Copper/Manganese/ Seleni/Zn 0.5 ml/ Insulin Human Regular 20 unit/ Total 

Parenteral Nutrition/Amino Acids/Dextrose/ Fat Emulsion Intravenous 1,400 ml @  

58.333 mls/ hr TPN  CONT IV  Last administered on 3/31/20at 21:36;  Start 

3/31/20 at 22:00;  Stop 4/1/20 at 21:59;  Status DC


Alteplase, Recombinant (Cathflo For Central Catheter Clearance) 1 mg 1X  ONCE 

INT CAT  Last administered on 3/31/20at 20:03;  Start 3/31/20 at 19:30;  Stop 

3/31/20 at 19:46;  Status DC


Alteplase, Recombinant (Cathflo For Central Catheter Clearance) 1 mg 1X  ONCE 

INT CAT  Last administered on 3/31/20at 22:05;  Start 3/31/20 at 22:00;  Stop 

3/31/20 at 22:01;  Status DC


Sodium Chloride 90 meq/Potassium Chloride 15 meq/ Potassium Phosphate 18 mmol/ 

Magnesium Sulfate 8 meq/Calcium Gluconate 15 meq/ Multivitamins 10 ml/Chromium/ 

Copper/Manganese/ Seleni/Zn 0.5 ml/ Insulin Human Regular 20 unit/ Total 

Parenteral Nutrition/Amino Acids/Dextrose/ Fat Emulsion Intravenous 1,400 ml @  

58.333 mls/ hr TPN  CONT IV  Last administered on 4/1/20at 21:30;  Start 4/1/20 

at 22:00;  Stop 4/2/20 at 21:59;  Status DC


Dexmedetomidine HCl 400 mcg/ Sodium Chloride 100 ml @ 0 mls/hr CONT  PRN IV 

ANXIETY / AGITATION Last administered on 5/30/20at 12:57;  Start 4/2/20 at 

08:15;  Stop 5/30/20 at 18:31;  Status DC


Sodium Chloride 500 ml @  500 mls/hr 1X PRN  PRN IV ELEVATED BP, SEE COMMENTS;  

Start 4/2/20 at 08:15


Atropine Sulfate (ATROPINE 0.5mg SYRINGE) 0.5 mg PRN Q5MIN  PRN IV SEE COMMENTS;

 Start 4/2/20 at 08:15


Furosemide (Lasix) 20 mg 1X  ONCE IVP  Last administered on 4/2/20at 08:19;  

Start 4/2/20 at 08:15;  Stop 4/2/20 at 08:16;  Status DC


Lidocaine HCl (Buffered Lidocaine 1%) 3 ml STK-MED ONCE .ROUTE ;  Start 4/2/20 

at 08:39;  Stop 4/2/20 at 08:39;  Status DC


Lidocaine HCl (Buffered Lidocaine 1%) 6 ml 1X  ONCE INJ  Last administered on 

4/2/20at 09:05;  Start 4/2/20 at 09:00;  Stop 4/2/20 at 09:06;  Status DC


Sodium Chloride 90 meq/Potassium Chloride 15 meq/ Potassium Phosphate 18 mmol/ 

Magnesium Sulfate 8 meq/Calcium Gluconate 15 meq/ Multivitamins 10 ml/Chromium/ 

Copper/Manganese/ Seleni/Zn 0.5 ml/ Insulin Human Regular 20 unit/ Total 

Parenteral Nutrition/Amino Acids/Dextrose/ Fat Emulsion Intravenous 1,400 ml @  

58.333 mls/ hr TPN  CONT IV  Last administered on 4/2/20at 22:45;  Start 4/2/20 

at 22:00;  Stop 4/3/20 at 21:59;  Status DC


Sodium Chloride 1,000 ml @  1,000 mls/hr Q1H PRN IV hypotension;  Start 4/3/20 

at 07:30;  Stop 4/3/20 at 13:29;  Status DC


Albumin Human 200 ml @  200 mls/hr 1X PRN  PRN IV Hypotension Last administered 

on 4/3/20at 09:36;  Start 4/3/20 at 07:30;  Stop 4/3/20 at 13:29;  Status DC


Sodium Chloride (Normal Saline Flush) 10 ml 1X PRN  PRN IV AP catheter pack;  

Start 4/3/20 at 07:30;  Stop 4/3/20 at 21:29;  Status DC


Sodium Chloride (Normal Saline Flush) 10 ml 1X PRN  PRN IV  catheter pack;  

Start 4/3/20 at 07:30;  Stop 4/4/20 at 07:29;  Status DC


Sodium Chloride 1,000 ml @  400 mls/hr Q2H30M PRN IV PATENCY;  Start 4/3/20 at 

07:30;  Stop 4/3/20 at 19:29;  Status DC


Info (PHARMACY MONITORING -- do not chart) 1 each PRN DAILY  PRN MC SEE 

COMMENTS;  Start 4/3/20 at 07:30;  Stop 4/3/20 at 13:02;  Status DC


Info (PHARMACY MONITORING -- do not chart) 1 each PRN DAILY  PRN MC SEE 

COMMENTS;  Start 4/3/20 at 07:30;  Stop 4/5/20 at 12:45;  Status DC


Sodium Chloride 90 meq/Potassium Chloride 15 meq/ Potassium Phosphate 10 mmol/ 

Magnesium Sulfate 8 meq/Calcium Gluconate 15 meq/ Multivitamins 10 ml/Chromium/ 

Copper/Manganese/ Seleni/Zn 0.5 ml/ Insulin Human Regular 25 unit/ Total 

Parenteral Nutrition/Amino Acids/Dextrose/ Fat Emulsion Intravenous 1,400 ml @  

58.333 mls/ hr TPN  CONT IV  Last administered on 4/3/20at 22:19;  Start 4/3/20 

at 22:00;  Stop 4/4/20 at 21:59;  Status DC


Heparin Sodium (Porcine) (Heparin Sodium) 5,000 unit Q12HR SQ  Last administered

on 4/26/20at 08:59;  Start 4/3/20 at 21:00;  Stop 4/26/20 at 10:05;  Status DC


Ondansetron HCl (Zofran) 4 mg PRN Q6HRS  PRN IV NAUSEA/VOMITING;  Start 4/6/20 

at 07:00;  Stop 4/7/20 at 06:59;  Status DC


Fentanyl Citrate (Fentanyl 2ml Vial) 25 mcg PRN Q5MIN  PRN IV MILD PAIN 1-3;  

Start 4/6/20 at 07:00;  Stop 4/7/20 at 06:59;  Status DC


Fentanyl Citrate (Fentanyl 2ml Vial) 50 mcg PRN Q5MIN  PRN IV MODERATE TO SEVERE

PAIN;  Start 4/6/20 at 07:00;  Stop 4/7/20 at 06:59;  Status DC


Ringer's Solution 1,000 ml @  30 mls/hr Q24H IV ;  Start 4/6/20 at 07:00;  Stop 

4/6/20 at 18:59;  Status DC


Lidocaine HCl (Xylocaine-Mpf 1% 2ml Vial) 2 ml PRN 1X  PRN ID PRIOR TO IV START;

 Start 4/6/20 at 07:00;  Stop 4/7/20 at 06:59;  Status DC


Prochlorperazine Edisylate (Compazine) 5 mg PACU PRN  PRN IV NAUSEA, MRX1;  

Start 4/6/20 at 07:00;  Stop 4/7/20 at 06:59;  Status DC


Sodium Chloride 1,000 ml @  1,000 mls/hr Q1H PRN IV hypotension;  Start 4/4/20 

at 09:10;  Stop 4/4/20 at 15:09;  Status DC


Albumin Human 200 ml @  200 mls/hr 1X PRN  PRN IV Hypotension Last administered 

on 4/4/20at 10:10;  Start 4/4/20 at 09:15;  Stop 4/4/20 at 15:14;  Status DC


Sodium Chloride 1,000 ml @  400 mls/hr Q2H30M PRN IV PATENCY;  Start 4/4/20 at 

09:10;  Stop 4/4/20 at 21:09;  Status DC


Info (PHARMACY MONITORING -- do not chart) 1 each PRN DAILY  PRN MC SEE 

COMMENTS;  Start 4/4/20 at 09:15;  Stop 4/5/20 at 12:45;  Status DC


Info (PHARMACY MONITORING -- do not chart) 1 each PRN DAILY  PRN MC SEE 

COMMENTS;  Start 4/4/20 at 09:15;  Stop 4/5/20 at 12:45;  Status DC


Sodium Chloride 90 meq/Potassium Chloride 15 meq/ Potassium Phosphate 10 mmol/ 

Magnesium Sulfate 8 meq/Calcium Gluconate 15 meq/ Multivitamins 10 ml/Chromium/ 

Copper/Manganese/ Seleni/Zn 0.5 ml/ Insulin Human Regular 25 unit/ Total 

Parenteral Nutrition/Amino Acids/Dextrose/ Fat Emulsion Intravenous 1,400 ml @  

58.333 mls/ hr TPN  CONT IV  Last administered on 4/4/20at 22:10;  Start 4/4/20 

at 22:00;  Stop 4/5/20 at 21:59;  Status DC


Magnesium Sulfate 50 ml @ 25 mls/hr PRN DAILY  PRN IV for Mag < 1.7 on am labs L

ast administered on 6/18/20at 10:57;  Start 4/5/20 at 09:15


Sodium Chloride 90 meq/Potassium Chloride 15 meq/ Potassium Phosphate 10 mmol/ 

Magnesium Sulfate 8 meq/Calcium Gluconate 15 meq/ Multivitamins 10 ml/Chromium/ 

Copper/Manganese/ Seleni/Zn 0.5 ml/ Insulin Human Regular 25 unit/ Total 

Parenteral Nutrition/Amino Acids/Dextrose/ Fat Emulsion Intravenous 1,400 ml @  

58.333 mls/ hr TPN  CONT IV  Last administered on 4/5/20at 21:20;  Start 4/5/20 

at 22:00;  Stop 4/6/20 at 21:59;  Status DC


Sodium Chloride 1,000 ml @  1,000 mls/hr Q1H PRN IV hypotension;  Start 4/5/20 

at 12:23;  Stop 4/5/20 at 18:22;  Status DC


Albumin Human 200 ml @  200 mls/hr 1X  ONCE IV  Last administered on 4/5/20at 

13:34;  Start 4/5/20 at 12:30;  Stop 4/5/20 at 13:29;  Status DC


Diphenhydramine HCl (Benadryl) 25 mg 1X PRN  PRN IV ITCHING;  Start 4/5/20 at 

12:30;  Stop 4/6/20 at 12:29;  Status DC


Diphenhydramine HCl (Benadryl) 25 mg 1X PRN  PRN IV ITCHING;  Start 4/5/20 at 

12:30;  Stop 4/6/20 at 12:29;  Status DC


Info (PHARMACY MONITORING -- do not chart) 1 each PRN DAILY  PRN MC SEE 

COMMENTS;  Start 4/5/20 at 12:30;  Status Cancel


Bupivacaine HCl/ Epinephrine Bitart (Sensorcain-Epi 0.5%-1:[id number] Mpf) 30 ml 

STK-MED ONCE .ROUTE  Last administered on 4/6/20at 11:44;  Start 4/6/20 at 

11:00;  Stop 4/6/20 at 11:01;  Status DC


Cellulose (Surgicel Fibrillar 1x2) 1 each STK-MED ONCE .ROUTE ;  Start 4/6/20 at

11:00;  Stop 4/6/20 at 11:01;  Status DC


Sodium Chloride 90 meq/Potassium Chloride 15 meq/ Potassium Phosphate 10 mmol/ 

Magnesium Sulfate 12 meq/Calcium Gluconate 15 meq/ Multivitamins 10 ml/Chromium/

Copper/Manganese/ Seleni/Zn 0.5 ml/ Insulin Human Regular 25 unit/ Total 

Parenteral Nutrition/Amino Acids/Dextrose/ Fat Emulsion Intravenous 1,400 ml @  

58.333 mls/ hr TPN  CONT IV  Last administered on 4/6/20at 22:24;  Start 4/6/20 

at 22:00;  Stop 4/7/20 at 21:59;  Status DC


Propofol 20 ml @ As Directed STK-MED ONCE IV ;  Start 4/6/20 at 11:07;  Stop 

4/6/20 at 11:07;  Status DC


Cellulose (Surgicel Hemostat 4x8) 1 each STK-MED ONCE .ROUTE  Last administered 

on 4/6/20at 11:44;  Start 4/6/20 at 11:55;  Stop 4/6/20 at 11:56;  Status DC


Sevoflurane (Ultane) 60 ml STK-MED ONCE IH ;  Start 4/6/20 at 12:46;  Stop 

4/6/20 at 12:46;  Status DC


Sodium Chloride 1,000 ml @  1,000 mls/hr Q1H PRN IV hypotension;  Start 4/6/20 

at 13:51;  Stop 4/6/20 at 19:50;  Status DC


Albumin Human 200 ml @  200 mls/hr 1X PRN  PRN IV Hypotension Last administered 

on 4/6/20at 14:51;  Start 4/6/20 at 14:00;  Stop 4/6/20 at 19:59;  Status DC


Diphenhydramine HCl (Benadryl) 25 mg 1X PRN  PRN IV ITCHING;  Start 4/6/20 at 

14:00;  Stop 4/7/20 at 13:59;  Status DC


Diphenhydramine HCl (Benadryl) 25 mg 1X PRN  PRN IV ITCHING;  Start 4/6/20 at 

14:00;  Stop 4/7/20 at 13:59;  Status DC


Sodium Chloride 1,000 ml @  400 mls/hr Q2H30M PRN IV PATENCY;  Start 4/6/20 at 

13:51;  Stop 4/7/20 at 01:50;  Status DC


Info (PHARMACY MONITORING -- do not chart) 1 each PRN DAILY  PRN MC SEE 

COMMENTS;  Start 4/6/20 at 14:00;  Stop 4/9/20 at 08:16;  Status DC


Heparin Sodium (Porcine) (Hep Lock Adult) 500 unit STK-MED ONCE IVP ;  Start 

4/7/20 at 09:29;  Stop 4/7/20 at 09:30;  Status DC


Sodium Chloride 1,000 ml @  1,000 mls/hr Q1H PRN IV hypotension;  Start 4/7/20 

at 10:43;  Stop 4/7/20 at 16:42;  Status DC


Sodium Chloride 1,000 ml @  400 mls/hr Q2H30M PRN IV PATENCY;  Start 4/7/20 at 

10:43;  Stop 4/7/20 at 22:42;  Status DC


Info (PHARMACY MONITORING -- do not chart) 1 each PRN DAILY  PRN MC SEE 

COMMENTS;  Start 4/7/20 at 10:45;  Status UNV


Info (PHARMACY MONITORING -- do not chart) 1 each PRN DAILY  PRN MC SEE COMMENT

S;  Start 4/7/20 at 10:45;  Status UNV


Sodium Chloride 90 meq/Potassium Chloride 15 meq/ Magnesium Sulfate 12 

meq/Calcium Gluconate 15 meq/ Multivitamins 10 ml/Chromium/ Copper/Manganese/ 

Seleni/Zn 0.5 ml/ Insulin Human Regular 25 unit/ Total Parenteral Nu

trition/Amino Acids/Dextrose/ Fat Emulsion Intravenous 1,400 ml @  58.333 mls/ 

hr TPN  CONT IV  Last administered on 4/7/20at 22:13;  Start 4/7/20 at 22:00;  

Stop 4/8/20 at 21:59;  Status DC


Sodium Chloride 1,000 ml @  1,000 mls/hr Q1H PRN IV hypotension;  Start 4/8/20 

at 07:50;  Stop 4/8/20 at 13:49;  Status DC


Albumin Human 200 ml @  200 mls/hr 1X  ONCE IV ;  Start 4/8/20 at 08:00;  Stop 

4/8/20 at 08:53;  Status DC


Diphenhydramine HCl (Benadryl) 25 mg 1X PRN  PRN IV ITCHING;  Start 4/8/20 at 

08:00;  Stop 4/9/20 at 07:59;  Status DC


Diphenhydramine HCl (Benadryl) 25 mg 1X PRN  PRN IV ITCHING;  Start 4/8/20 at 

08:00;  Stop 4/9/20 at 07:59;  Status DC


Info (PHARMACY MONITORING -- do not chart) 1 each PRN DAILY  PRN MC SEE 

COMMENTS;  Start 4/8/20 at 08:00;  Stop 4/9/20 at 08:16;  Status DC


Albumin Human 50 ml @ 50 mls/hr 1X  ONCE IV ;  Start 4/8/20 at 08:53;  Stop 

4/8/20 at 08:56;  Status DC


Albumin Human 200 ml @  50 mls/hr PRN 1X  PRN IV HYPOTENSION Last administered 

on 4/14/20at 11:54;  Start 4/8/20 at 09:00;  Stop 5/21/20 at 11:14;  Status DC


Meropenem 500 mg/ Sodium Chloride 50 ml @  100 mls/hr Q12H IV  Last administered

on 4/28/20at 10:45;  Start 4/8/20 at 10:00;  Stop 4/28/20 at 12:37;  Status DC


Sodium Chloride 90 meq/Magnesium Sulfate 12 meq/ Calcium Gluconate 15 meq/ 

Multivitamins 10 ml/Chromium/ Copper/Manganese/ Seleni/Zn 0.5 ml/ Insulin Human 

Regular 25 unit/ Total Parenteral Nutrition/Amino Acids/Dextrose/ Fat Emulsion 

Intravenous 1,400 ml @  58.333 mls/ hr TPN  CONT IV  Last administered on 

4/8/20at 21:41;  Start 4/8/20 at 22:00;  Stop 4/9/20 at 21:59;  Status DC


Sodium Chloride 1,000 ml @  1,000 mls/hr Q1H PRN IV hypotension;  Start 4/9/20 

at 07:58;  Stop 4/9/20 at 13:57;  Status DC


Albumin Human 200 ml @  200 mls/hr 1X PRN  PRN IV Hypotension Last administered 

on 4/9/20at 09:30;  Start 4/9/20 at 08:00;  Stop 4/9/20 at 13:59;  Status DC


Sodium Chloride 1,000 ml @  400 mls/hr Q2H30M PRN IV PATENCY;  Start 4/9/20 at 

07:58;  Stop 4/9/20 at 19:57;  Status DC


Info (PHARMACY MONITORING -- do not chart) 1 each PRN DAILY  PRN MC SEE 

COMMENTS;  Start 4/9/20 at 08:00;  Status Cancel


Info (PHARMACY MONITORING -- do not chart) 1 each PRN DAILY  PRN MC SEE 

COMMENTS;  Start 4/9/20 at 08:15;  Status UNV


Sodium Chloride 90 meq/Potassium Phosphate 5 mmol/ Magnesium Sulfate 12 

meq/Calcium Gluconate 15 meq/ Multivitamins 10 ml/Chromium/ Copper/Manganese/ 

Seleni/Zn 0.5 ml/ Insulin Human Regular 30 unit/ Total Parenteral 

Nutrition/Amino Acids/Dextrose/ Fat Emulsion Intravenous 1,400 ml @  58.333 mls/

hr TPN  CONT IV  Last administered on 4/9/20at 22:08;  Start 4/9/20 at 22:00;  

Stop 4/10/20 at 21:59;  Status DC


Linezolid/Dextrose 300 ml @  300 mls/hr Q12HR IV  Last administered on 4/20/20at

20:40;  Start 4/10/20 at 11:00;  Stop 4/21/20 at 08:10;  Status DC


Sodium Chloride 90 meq/Potassium Phosphate 15 mmol/ Magnesium Sulfate 12 

meq/Calcium Gluconate 15 meq/ Multivitamins 10 ml/Chromium/ Copper/Manganese/ 

Seleni/Zn 0.5 ml/ Insulin Human Regular 30 unit/ Total Parenteral 

Nutrition/Amino Acids/Dextrose/ Fat Emulsion Intravenous 1,400 ml @  58.333 mls/

hr TPN  CONT IV  Last administered on 4/10/20at 21:49;  Start 4/10/20 at 22:00; 

Stop 4/11/20 at 21:59;  Status DC


Sodium Chloride 90 meq/Potassium Phosphate 15 mmol/ Magnesium Sulfate 12 

meq/Calcium Gluconate 15 meq/ Multivitamins 10 ml/Chromium/ Copper/Manganese/ 

Seleni/Zn 0.5 ml/ Insulin Human Regular 40 unit/ Total Parenteral 

Nutrition/Amino Acids/Dextrose/ Fat Emulsion Intravenous 1,400 ml @  58.333 mls/

hr TPN  CONT IV  Last administered on 4/11/20at 21:21;  Start 4/11/20 at 22:00; 

Stop 4/12/20 at 21:59;  Status DC


Sodium Chloride 1,000 ml @  1,000 mls/hr Q1H PRN IV hypotension;  Start 4/11/20 

at 13:26;  Stop 4/11/20 at 19:25;  Status DC


Albumin Human 200 ml @  200 mls/hr 1X PRN  PRN IV Hypotension Last administered 

on 4/11/20at 15:00;  Start 4/11/20 at 13:30;  Stop 4/11/20 at 19:29;  Status DC


Sodium Chloride (Normal Saline Flush) 10 ml 1X PRN  PRN IV AP catheter pack;  

Start 4/11/20 at 13:30;  Stop 4/12/20 at 13:29;  Status DC


Sodium Chloride (Normal Saline Flush) 10 ml 1X PRN  PRN IV  catheter pack;  

Start 4/11/20 at 13:30;  Stop 4/12/20 at 13:29;  Status DC


Sodium Chloride 1,000 ml @  400 mls/hr Q2H30M PRN IV PATENCY;  Start 4/11/20 at 

13:26;  Stop 4/12/20 at 01:25;  Status DC


Info (PHARMACY MONITORING -- do not chart) 1 each PRN DAILY  PRN MC SEE 

COMMENTS;  Start 4/11/20 at 13:30;  Stop 4/11/20 at 13:33;  Status DC


Info (PHARMACY MONITORING -- do not chart) 1 each PRN DAILY  PRN MC SEE 

COMMENTS;  Start 4/11/20 at 13:30;  Stop 4/11/20 at 13:34;  Status DC


Sodium Chloride 90 meq/Potassium Phosphate 19 mmol/ Magnesium Sulfate 12 

meq/Calcium Gluconate 15 meq/ Multivitamins 10 ml/Chromium/ Copper/Manganese/ 

Seleni/Zn 0.5 ml/ Insulin Human Regular 40 unit/ Total Parenteral 

Nutrition/Amino Acids/Dextrose/ Fat Emulsion Intravenous 1,400 ml @  58.333 mls/

hr TPN  CONT IV  Last administered on 4/12/20at 21:54;  Start 4/12/20 at 22:00; 

Stop 4/13/20 at 21:59;  Status DC


Sodium Chloride 1,000 ml @  1,000 mls/hr Q1H PRN IV hypotension;  Start 4/13/20 

at 09:35;  Stop 4/13/20 at 15:34;  Status DC


Albumin Human 200 ml @  200 mls/hr 1X PRN  PRN IV Hypotension;  Start 4/13/20 at

09:45;  Stop 4/13/20 at 15:44;  Status DC


Diphenhydramine HCl (Benadryl) 25 mg 1X PRN  PRN IV ITCHING;  Start 4/13/20 at 

09:45;  Stop 4/14/20 at 09:44;  Status DC


Diphenhydramine HCl (Benadryl) 25 mg 1X PRN  PRN IV ITCHING;  Start 4/13/20 at 

09:45;  Stop 4/14/20 at 09:44;  Status DC


Sodium Chloride 1,000 ml @  400 mls/hr Q2H30M PRN IV PATENCY;  Start 4/13/20 at 

09:35;  Stop 4/13/20 at 21:34;  Status DC


Info (PHARMACY MONITORING -- do not chart) 1 each PRN DAILY  PRN MC SEE 

COMMENTS;  Start 4/13/20 at 09:45;  Status Cancel


Sodium Chloride 100 meq/Potassium Phosphate 19 mmol/ Magnesium Sulfate 12 

meq/Calcium Gluconate 15 meq/ Multivitamins 10 ml/Chromium/ Copper/Manganese/ 

Seleni/Zn 0.5 ml/ Insulin Human Regular 40 unit/ Potassium Chloride 20 meq/ 

Total Parenteral Nutrition/Amino Acids/Dextrose/ Fat Emulsion Intravenous 1,400 

ml @  58.333 mls/ hr TPN  CONT IV  Last administered on 4/13/20at 22:02;  Start 

4/13/20 at 22:00;  Stop 4/14/20 at 21:59;  Status DC


Furosemide (Lasix) 40 mg 1X  ONCE IVP  Last administered on 4/13/20at 14:39;  

Start 4/13/20 at 14:30;  Stop 4/13/20 at 14:31;  Status DC


Metronidazole 100 ml @  100 mls/hr Q8HRS IV  Last administered on 4/21/20at 

06:04;  Start 4/14/20 at 10:00;  Stop 4/21/20 at 08:10;  Status DC


Sodium Chloride 1,000 ml @  1,000 mls/hr Q1H PRN IV hypotension;  Start 4/14/20 

at 08:00;  Stop 4/14/20 at 13:59;  Status DC


Albumin Human 200 ml @  200 mls/hr 1X PRN  PRN IV Hypotension;  Start 4/14/20 at

08:00;  Stop 4/14/20 at 13:59;  Status DC


Sodium Chloride 1,000 ml @  400 mls/hr Q2H30M PRN IV PATENCY;  Start 4/14/20 at 

08:00;  Stop 4/14/20 at 19:59;  Status DC


Info (PHARMACY MONITORING -- do not chart) 1 each PRN DAILY  PRN MC SEE 

COMMENTS;  Start 4/14/20 at 11:30;  Status UNV


Info (PHARMACY MONITORING -- do not chart) 1 each PRN DAILY  PRN MC SEE 

COMMENTS;  Start 4/14/20 at 11:30;  Stop 4/16/20 at 12:13;  Status DC


Sodium Chloride 100 meq/Potassium Phosphate 19 mmol/ Magnesium Sulfate 12 meq/

Calcium Gluconate 15 meq/ Multivitamins 10 ml/Chromium/ Copper/Manganese/ 

Seleni/Zn 0.5 ml/ Insulin Human Regular 40 unit/ Potassium Chloride 20 meq/ 

Total Parenteral Nutrition/Amino Acids/Dextrose/ Fat Emulsion Intravenous 1,400 

ml @  58.333 mls/ hr TPN  CONT IV  Last administered on 4/14/20at 21:52;  Start 

4/14/20 at 22:00;  Stop 4/15/20 at 21:59;  Status DC


Sodium Chloride (Normal Saline Flush) 10 ml QSHIFT  PRN IV AFTER MEDS AND BLOOD 

DRAWS;  Start 4/14/20 at 15:00;  Stop 5/12/20 at 11:27;  Status DC


Sodium Chloride (Normal Saline Flush) 10 ml PRN Q5MIN  PRN IV AFTER MEDS AND 

BLOOD DRAWS;  Start 4/14/20 at 15:00


Sodium Chloride (Normal Saline Flush) 20 ml PRN Q5MIN  PRN IV AFTER MEDS AND 

BLOOD DRAWS;  Start 4/14/20 at 15:00


Sodium Chloride 100 meq/Potassium Phosphate 19 mmol/ Magnesium Sulfate 12 

meq/Calcium Gluconate 15 meq/ Multivitamins 10 ml/Chromium/ Copper/Manganese/ 

Seleni/Zn 0.5 ml/ Insulin Human Regular 40 unit/ Potassium Chloride 20 meq/ 

Total Parenteral Nutrition/Amino Acids/Dextrose/ Fat Emulsion Intravenous 1,400 

ml @  58.333 mls/ hr TPN  CONT IV  Last administered on 4/15/20at 21:20;  Start 

4/15/20 at 22:00;  Stop 4/16/20 at 21:59;  Status DC


Lidocaine HCl (Buffered Lidocaine 1%) 3 ml STK-MED ONCE .ROUTE ;  Start 4/15/20 

at 13:16;  Stop 4/15/20 at 13:16;  Status DC


Lidocaine HCl (Buffered Lidocaine 1%) 6 ml 1X  ONCE INJ  Last administered on 

4/15/20at 13:45;  Start 4/15/20 at 13:30;  Stop 4/15/20 at 13:31;  Status DC


Albumin Human 100 ml @  100 mls/hr 1X  ONCE IV  Last administered on 4/15/20at 

15:41;  Start 4/15/20 at 15:00;  Stop 4/15/20 at 15:59;  Status DC


Albumin Human 50 ml @ 50 mls/hr 1X  ONCE IV  Last administered on 4/15/20at 

15:00;  Start 4/15/20 at 15:00;  Stop 4/15/20 at 15:59;  Status DC


Info (PHARMACY MONITORING -- do not chart) 1 each PRN DAILY  PRN MC SEE 

COMMENTS;  Start 4/16/20 at 11:30;  Status Cancel


Info (PHARMACY MONITORING -- do not chart) 1 each PRN DAILY  PRN MC SEE 

COMMENTS;  Start 4/16/20 at 11:30;  Status UNV


Sodium Chloride 100 meq/Potassium Phosphate 10 mmol/ Magnesium Sulfate 12 

meq/Calcium Gluconate 15 meq/ Multivitamins 10 ml/Chromium/ Copper/Manganese/ 

Seleni/Zn 0.5 ml/ Insulin Human Regular 35 unit/ Potassium Chloride 20 meq/ 

Total Parenteral Nutrition/Amino Acids/Dextrose/ Fat Emulsion Intravenous 1,400 

ml @  58.333 mls/ hr TPN  CONT IV  Last administered on 4/16/20at 22:10;  Start 

4/16/20 at 22:00;  Stop 4/17/20 at 21:59;  Status DC


Sodium Chloride 100 meq/Potassium Phosphate 5 mmol/ Magnesium Sulfate 12 

meq/Calcium Gluconate 15 meq/ Multivitamins 10 ml/Chromium/ Copper/Manganese/ 

Seleni/Zn 0.5 ml/ Insulin Human Regular 35 unit/ Potassium Chloride 20 meq/ 

Total Parenteral Nutrition/Amino Acids/Dextrose/ Fat Emulsion Intravenous 1,400 

ml @  58.333 mls/ hr TPN  CONT IV  Last administered on 4/17/20at 22:59;  Start 

4/17/20 at 22:00;  Stop 4/18/20 at 21:59;  Status DC


Sodium Chloride 1,000 ml @  1,000 mls/hr Q1H PRN IV hypotension;  Start 4/18/20 

at 08:27;  Stop 4/18/20 at 14:26;  Status DC


Albumin Human 200 ml @  200 mls/hr 1X PRN  PRN IV Hypotension Last administered 

on 4/18/20at 09:18;  Start 4/18/20 at 08:30;  Stop 4/18/20 at 14:29;  Status DC


Sodium Chloride 1,000 ml @  400 mls/hr Q2H30M PRN IV PATENCY;  Start 4/18/20 at 

08:27;  Stop 4/18/20 at 20:26;  Status DC


Info (PHARMACY MONITORING -- do not chart) 1 each PRN DAILY  PRN MC SEE 

COMMENTS;  Start 4/18/20 at 08:30;  Status Cancel


Info (PHARMACY MONITORING -- do not chart) 1 each PRN DAILY  PRN MC SEE 

COMMENTS;  Start 4/18/20 at 08:30;  Stop 4/26/20 at 13:10;  Status DC


Sodium Chloride 100 meq/Potassium Chloride 40 meq/ Magnesium Sulfate 15 

meq/Calcium Gluconate 15 meq/ Multivitamins 10 ml/Chromium/ Copper/Manganese/ 

Seleni/Zn 0.5 ml/ Insulin Human Regular 35 unit/ Total Parenteral 

Nutrition/Amino Acids/Dextrose/ Fat Emulsion Intravenous 1,400 ml @  58.333 mls/

hr TPN  CONT IV  Last administered on 4/18/20at 22:00;  Start 4/18/20 at 22:00; 

Stop 4/19/20 at 21:59;  Status DC


Potassium Chloride/Water 100 ml @  100 mls/hr 1X  ONCE IV  Last administered on 

4/18/20at 17:28;  Start 4/18/20 at 14:45;  Stop 4/18/20 at 15:44;  Status DC


Sodium Chloride 100 meq/Potassium Chloride 40 meq/ Magnesium Sulfate 15 

meq/Calcium Gluconate 15 meq/ Multivitamins 10 ml/Chromium/ Copper/Manganese/ 

Seleni/Zn 0.5 ml/ Insulin Human Regular 35 unit/ Total Parenteral 

Nutrition/Amino Acids/Dextrose/ Fat Emulsion Intravenous 1,400 ml @  58.333 mls/

hr TPN  CONT IV  Last administered on 4/19/20at 22:46;  Start 4/19/20 at 22:00; 

Stop 4/20/20 at 21:59;  Status DC


Sodium Chloride 100 meq/Potassium Chloride 40 meq/ Magnesium Sulfate 20 

meq/Calcium Gluconate 15 meq/ Multivitamins 10 ml/Chromium/ Copper/Manganese/ 

Seleni/Zn 0.5 ml/ Insulin Human Regular 35 unit/ Total Parenteral 

Nutrition/Amino Acids/Dextrose/ Fat Emulsion Intravenous 1,400 ml @  58.333 mls/

hr TPN  CONT IV  Last administered on 4/20/20at 22:31;  Start 4/20/20 at 22:00; 

Stop 4/21/20 at 21:59;  Status DC


Fentanyl Citrate (Fentanyl 2ml Vial) 50 mcg PRN Q2HR  PRN IVP PAIN Last 

administered on 4/27/20at 13:32;  Start 4/20/20 at 21:00;  Stop 4/28/20 at 

12:53;  Status DC


Fentanyl Citrate (Fentanyl 2ml Vial) 25 mcg PRN Q2HR  PRN IVP PAIN;  Start 

4/20/20 at 21:00;  Stop 4/28/20 at 12:54;  Status DC


Enoxaparin Sodium (Lovenox 100mg Syringe) 100 mg Q12HR SQ ;  Start 4/21/20 at 

21:00;  Status UNV


Amino Acids/ Glycerin/ Electrolytes 1,000 ml @  75 mls/hr [id number] IV ;  Start 

4/20/20 at 21:15;  Status UNV


Sodium Chloride 1,000 ml @  1,000 mls/hr Q1H PRN IV hypotension;  Start 4/21/20 

at 07:56;  Stop 4/21/20 at 13:55;  Status DC


Albumin Human 200 ml @  200 mls/hr 1X PRN  PRN IV Hypotension Last administered 

on 4/21/20at 08:40;  Start 4/21/20 at 08:00;  Stop 4/21/20 at 13:59;  Status DC


Sodium Chloride 1,000 ml @  400 mls/hr Q2H30M PRN IV PATENCY;  Start 4/21/20 at 

07:56;  Stop 4/21/20 at 19:55;  Status DC


Info (PHARMACY MONITORING -- do not chart) 1 each PRN DAILY  PRN MC SEE 

COMMENTS;  Start 4/21/20 at 08:00;  Status UNV


Info (PHARMACY MONITORING -- do not chart) 1 each PRN DAILY  PRN MC SEE 

COMMENTS;  Start 4/21/20 at 08:00;  Status UNV


Daptomycin 430 mg/ Sodium Chloride 50 ml @  100 mls/hr Q24H IV  Last 

administered on 4/21/20at 12:35;  Start 4/21/20 at 09:00;  Stop 4/21/20 at 

12:49;  Status DC


Sodium Chloride 100 meq/Potassium Chloride 40 meq/ Magnesium Sulfate 20 

meq/Calcium Gluconate 15 meq/ Multivitamins 10 ml/Chromium/ Copper/Manganese/ 

Seleni/Zn 0.5 ml/ Insulin Human Regular 35 unit/ Total Parenteral 

Nutrition/Amino Acids/Dextrose/ Fat Emulsion Intravenous 1,400 ml @  58.333 mls/

hr TPN  CONT IV  Last administered on 4/21/20at 21:26;  Start 4/21/20 at 22:00; 

Stop 4/22/20 at 21:59;  Status DC


Daptomycin 430 mg/ Sodium Chloride 50 ml @  100 mls/hr Q48H IV ;  Start 4/23/20 

at 09:00;  Stop 4/22/20 at 11:55;  Status DC


Sodium Chloride 100 meq/Potassium Chloride 40 meq/ Magnesium Sulfate 20 

meq/Calcium Gluconate 15 meq/ Multivitamins 10 ml/Chromium/ Copper/Manganese/ 

Seleni/Zn 0.5 ml/ Insulin Human Regular 35 unit/ Total Parenteral 

Nutrition/Amino Acids/Dextrose/ Fat Emulsion Intravenous 1,400 ml @  58.333 mls/

hr TPN  CONT IV  Last administered on 4/22/20at 22:27;  Start 4/22/20 at 22:00; 

Stop 4/23/20 at 21:59;  Status DC


Daptomycin 430 mg/ Sodium Chloride 50 ml @  100 mls/hr Q24H IV  Last 

administered on 4/24/20at 15:07;  Start 4/22/20 at 13:00;  Stop 4/25/20 at 

13:15;  Status DC


Sodium Chloride 100 meq/Potassium Chloride 40 meq/ Magnesium Sulfate 20 me

q/Calcium Gluconate 10 meq/ Multivitamins 10 ml/Chromium/ Copper/Manganese/ 

Seleni/Zn 0.5 ml/ Insulin Human Regular 35 unit/ Total Parenteral 

Nutrition/Amino Acids/Dextrose/ Fat Emulsion Intravenous 1,400 ml @  58.333 mls/

hr TPN  CONT IV  Last administered on 4/24/20at 00:06;  Start 4/23/20 at 22:00; 

Stop 4/24/20 at 21:59;  Status DC


Alteplase, Recombinant (Cathflo For Central Catheter Clearance) 1 mg 1X  ONCE 

INT CAT  Last administered on 4/24/20at 11:44;  Start 4/24/20 at 10:45;  Stop 

4/24/20 at 10:46;  Status DC


Ondansetron HCl (Zofran) 4 mg PRN Q6HRS  PRN IV NAUSEA/VOMITING;  Start 4/27/20 

at 07:00;  Stop 4/28/20 at 06:59;  Status DC


Fentanyl Citrate (Fentanyl 2ml Vial) 25 mcg PRN Q5MIN  PRN IV MILD PAIN 1-3;  

Start 4/27/20 at 07:00;  Stop 4/28/20 at 06:59;  Status DC


Fentanyl Citrate (Fentanyl 2ml Vial) 50 mcg PRN Q5MIN  PRN IV MODERATE TO SEVERE

PAIN Last administered on 4/27/20at 10:17;  Start 4/27/20 at 07:00;  Stop 

4/28/20 at 06:59;  Status DC


Ringer's Solution 1,000 ml @  30 mls/hr Q24H IV ;  Start 4/27/20 at 07:00;  Stop

4/27/20 at 18:59;  Status DC


Lidocaine HCl (Xylocaine-Mpf 1% 2ml Vial) 2 ml PRN 1X  PRN ID PRIOR TO IV START;

 Start 4/27/20 at 07:00;  Stop 4/28/20 at 06:59;  Status DC


Prochlorperazine Edisylate (Compazine) 5 mg PACU PRN  PRN IV NAUSEA, MRX1;  

Start 4/27/20 at 07:00;  Stop 4/28/20 at 06:59;  Status DC


Sodium Acetate 50 meq/Potassium Acetate 55 meq/ Magnesium Sulfate 20 meq/Calcium

Gluconate 10 meq/ Multivitamins 10 ml/Chromium/ Copper/Manganese/ Seleni/Zn 0.5 

ml/ Insulin Human Regular 35 unit/ Total Parenteral Nutrition/Amino 

Acids/Dextrose/ Fat Emulsion Intravenous 1,400 ml @  58.333 mls/ hr TPN  CONT IV

;  Start 4/24/20 at 22:00;  Stop 4/24/20 at 14:15;  Status DC


Sodium Acetate 50 meq/Potassium Acetate 55 meq/ Magnesium Sulfate 20 meq/Calcium

Gluconate 10 meq/ Multivitamins 10 ml/Chromium/ Copper/Manganese/ Seleni/Zn 0.5 

ml/ Insulin Human Regular 35 unit/ Total Parenteral Nutrition/Amino 

Acids/Dextrose/ Fat Emulsion Intravenous 1,800 ml @  75 mls/hr TPN  CONT IV  

Last administered on 4/24/20at 22:38;  Start 4/24/20 at 22:00;  Stop 4/25/20 at 

21:59;  Status DC


Sodium Chloride 1,000 ml @  1,000 mls/hr Q1H PRN IV hypotension;  Start 4/24/20 

at 15:31;  Stop 4/24/20 at 21:30;  Status DC


Diphenhydramine HCl (Benadryl) 25 mg 1X PRN  PRN IV ITCHING;  Start 4/24/20 at 

15:45;  Stop 4/25/20 at 15:44;  Status DC


Diphenhydramine HCl (Benadryl) 25 mg 1X PRN  PRN IV ITCHING;  Start 4/24/20 at 

15:45;  Stop 4/25/20 at 15:44;  Status DC


Sodium Chloride 1,000 ml @  400 mls/hr Q2H30M PRN IV PATENCY;  Start 4/24/20 at 

15:31;  Stop 4/25/20 at 03:30;  Status DC


Info (PHARMACY MONITORING -- do not chart) 1 each PRN DAILY  PRN MC SEE 

COMMENTS;  Start 4/24/20 at 15:45;  Stop 5/26/20 at 14:14;  Status DC


Sodium Acetate 50 meq/Potassium Acetate 55 meq/ Magnesium Sulfate 20 meq/Calcium

Gluconate 10 meq/ Multivitamins 10 ml/Chromium/ Copper/Manganese/ Seleni/Zn 0.5 

ml/ Insulin Human Regular 35 unit/ Total Parenteral Nutrition/Amino 

Acids/Dextrose/ Fat Emulsion Intravenous 1,800 ml @  75 mls/hr TPN  CONT IV  

Last administered on 4/25/20at 22:03;  Start 4/25/20 at 22:00;  Stop 4/26/20 at 

21:59;  Status DC


Daptomycin 430 mg/ Sodium Chloride 50 ml @  100 mls/hr Q24H IV  Last 

administered on 4/30/20at 13:00;  Start 4/25/20 at 13:00;  Stop 4/30/20 at 

20:58;  Status DC


Heparin Sodium (Porcine) 1000 unit/Sodium Chloride 1,001 ml @  1,001 mls/hr 1X  

ONCE IRR ;  Start 4/27/20 at 06:00;  Stop 4/27/20 at 06:59;  Status DC


Potassium Acetate 55 meq/Magnesium Sulfate 20 meq/ Calcium Gluconate 10 meq/ 

Multivitamins 10 ml/Chromium/ Copper/Manganese/ Seleni/Zn 0.5 ml/ Insulin Human 

Regular 35 unit/ Total Parenteral Nutrition/Amino Acids/Dextrose/ Fat Emulsion 

Intravenous 1,920 ml @  80 mls/hr TPN  CONT IV  Last administered on 4/26/20at 

22:10;  Start 4/26/20 at 22:00;  Stop 4/27/20 at 21:59;  Status DC


Dexamethasone Sodium Phosphate (Decadron) 4 mg STK-MED ONCE .ROUTE ;  Start 

4/27/20 at 10:56;  Stop 4/27/20 at 10:57;  Status DC


Ondansetron HCl (Zofran) 4 mg STK-MED ONCE .ROUTE ;  Start 4/27/20 at 10:56;  

Stop 4/27/20 at 10:57;  Status DC


Rocuronium Bromide (Zemuron) 50 mg STK-MED ONCE .ROUTE ;  Start 4/27/20 at 

10:56;  Stop 4/27/20 at 10:57;  Status DC


Fentanyl Citrate (Fentanyl 2ml Vial) 100 mcg STK-MED ONCE .ROUTE ;  Start 

4/27/20 at 10:56;  Stop 4/27/20 at 10:57;  Status DC


Bupivacaine HCl/ Epinephrine Bitart (Sensorcain-Epi 0.5%-1:369343 Mpf) 30 ml 

STK-MED ONCE .ROUTE  Last administered on 4/27/20at 12:01;  Start 4/27/20 at 

10:58;  Stop 4/27/20 at 10:58;  Status DC


Cellulose (Surgicel Hemostat 2x14) 1 each STK-MED ONCE .ROUTE ;  Start 4/27/20 

at 10:58;  Stop 4/27/20 at 10:59;  Status DC


Iohexol (Omnipaque 300 Mg/ml) 50 ml STK-MED ONCE .ROUTE ;  Start 4/27/20 at 

10:58;  Stop 4/27/20 at 10:59;  Status DC


Cellulose (Surgicel Hemostat 4x8) 1 each STK-MED ONCE .ROUTE ;  Start 4/27/20 at

10:58;  Stop 4/27/20 at 10:59;  Status DC


Bisacodyl (Dulcolax Supp) 10 mg STK-MED ONCE .ROUTE ;  Start 4/27/20 at 10:59;  

Stop 4/27/20 at 10:59;  Status DC


Heparin Sodium (Porcine) 1000 unit/Sodium Chloride 1,001 ml @  1,001 mls/hr 1X  

ONCE IRR ;  Start 4/27/20 at 12:00;  Stop 4/27/20 at 12:59;  Status DC


Propofol 20 ml @ As Directed STK-MED ONCE IV ;  Start 4/27/20 at 11:05;  Stop 

4/27/20 at 11:05;  Status DC


Sevoflurane (Ultane) 90 ml STK-MED ONCE IH ;  Start 4/27/20 at 11:05;  Stop 

4/27/20 at 11:05;  Status DC


Sevoflurane (Ultane) 60 ml STK-MED ONCE IH ;  Start 4/27/20 at 12:26;  Stop 

4/27/20 at 12:27;  Status DC


Propofol 20 ml @ As Directed STK-MED ONCE IV ;  Start 4/27/20 at 12:26;  Stop 

4/27/20 at 12:27;  Status DC


Phenylephrine HCl (PHENYLEPHRINE in 0.9% NACL PF) 1 mg STK-MED ONCE IV ;  Start 

4/27/20 at 12:34;  Stop 4/27/20 at 12:34;  Status DC


Heparin Sodium (Porcine) (Heparin Sodium) 5,000 unit Q12HR SQ  Last administered

on 5/6/20at 20:57;  Start 4/27/20 at 21:00;  Stop 5/7/20 at 09:59;  Status DC


Sodium Chloride (Normal Saline Flush) 3 ml QSHIFT  PRN IV AFTER MEDS AND BLOOD 

DRAWS;  Start 4/27/20 at 13:45;  Status Cancel


Naloxone HCl (Narcan) 0.4 mg PRN Q2MIN  PRN IV SEE INSTRUCTIONS Last 

administered on 6/6/20at 15:15;  Start 4/27/20 at 13:45;  Stop 7/1/20 at 16:00; 

Status DC


Sodium Chloride 1,000 ml @  25 mls/hr Q24H IV  Last administered on 5/26/20at 

13:37;  Start 4/27/20 at 13:37;  Stop 5/29/20 at 13:09;  Status DC


Naloxone HCl (Narcan) 0.4 mg PRN Q2MIN  PRN IV SEE INSTRUCTIONS;  Start 4/27/20 

at 14:30;  Status UNV


Sodium Chloride 1,000 ml @  25 mls/hr Q24H IV ;  Start 4/27/20 at 14:30;  Status

UNV


Hydromorphone HCl 30 ml @ 0 mls/hr CONT PRN  PRN IV PER PROTOCOL Last 

administered on 5/2/20at 16:08;  Start 4/27/20 at 14:30;  Stop 5/4/20 at 08:55; 

Status DC


Potassium Acetate 55 meq/Magnesium Sulfate 20 meq/ Calcium Gluconate 10 meq/ 

Multivitamins 10 ml/Chromium/ Copper/Manganese/ Seleni/Zn 0.5 ml/ Insulin Human 

Regular 35 unit/ Total Parenteral Nutrition/Amino Acids/Dextrose/ Fat Emulsion 

Intravenous 1,920 ml @  80 mls/hr TPN  CONT IV  Last administered on 4/27/20at 

22:01;  Start 4/27/20 at 22:00;  Stop 4/28/20 at 21:59;  Status DC


Bumetanide (Bumex) 2 mg [id number] IV  Last administered on 5/1/20at 13:50;  Start 

4/28/20 at 14:00;  Stop 5/2/20 at 14:10;  Status DC


Meropenem 1 gm/ Sodium Chloride 100 ml @  200 mls/hr Q8HRS IV  Last administered

on 5/22/20at 05:53;  Start 4/28/20 at 14:00;  Stop 5/22/20 at 09:31;  Status DC


Potassium Acetate 55 meq/Magnesium Sulfate 20 meq/ Calcium Gluconate 10 meq/ 

Multivitamins 10 ml/Chromium/ Copper/Manganese/ Seleni/Zn 0.5 ml/ Insulin Human 

Regular 35 unit/ Total Parenteral Nutrition/Amino Acids/Dextrose/ Fat Emulsion 

Intravenous 1,920 ml @  80 mls/hr TPN  CONT IV  Last administered on 4/28/20at 

22:02;  Start 4/28/20 at 22:00;  Stop 4/29/20 at 21:59;  Status DC


Hydromorphone HCl (Dilaudid Standard PCA) 12 mg STK-MED ONCE IV ;  Start 4/27/20

at 14:35;  Stop 4/28/20 at 13:53;  Status DC


Artificial Tears (Artificial Tears) 1 drop PRN Q15MIN  PRN OU DRY EYE Last 

administered on 6/23/20at 21:17;  Start 4/29/20 at 05:30


Hydromorphone HCl (Dilaudid Standard PCA) 12 mg STK-MED ONCE IV ;  Start 4/28/20

at 12:05;  Stop 4/29/20 at 09:15;  Status DC


Potassium Acetate 65 meq/Magnesium Sulfate 20 meq/ Calcium Gluconate 10 meq/ 

Multivitamins 10 ml/Chromium/ Copper/Manganese/ Seleni/Zn 0.5 ml/ Insulin Human 

Regular 30 unit/ Total Parenteral Nutrition/Amino Acids/Dextrose/ Fat Emulsion 

Intravenous 1,920 ml @  80 mls/hr TPN  CONT IV  Last administered on 4/29/20at 

22:22;  Start 4/29/20 at 22:00;  Stop 4/30/20 at 21:59;  Status DC


Cyclobenzaprine HCl (Flexeril) 10 mg PRN Q6HRS  PRN PO MUSCLE SPASMS Last 

administered on 7/10/20at 19:12;  Start 4/30/20 at 10:45


Potassium Acetate 55 meq/Magnesium Sulfate 20 meq/ Calcium Gluconate 10 meq/ 

Multivitamins 10 ml/Chromium/ Copper/Manganese/ Seleni/Zn 0.5 ml/ Insulin Human 

Regular 30 unit/ Total Parenteral Nutrition/Amino Acids/Dextrose/ Fat Emulsion 

Intravenous 1,920 ml @  80 mls/hr TPN  CONT IV  Last administered on 5/1/20at 

01:00;  Start 4/30/20 at 22:00;  Stop 5/1/20 at 21:59;  Status DC


Magnesium Sulfate 50 ml @ 25 mls/hr 1X  ONCE IV  Last administered on 4/30/20at 

17:18;  Start 4/30/20 at 12:45;  Stop 4/30/20 at 14:44;  Status DC


Potassium Chloride/Water 100 ml @  100 mls/hr 1X  ONCE IV  Last administered on 

5/1/20at 11:27;  Start 5/1/20 at 12:00;  Stop 5/1/20 at 12:59;  Status DC


Hydromorphone HCl (Dilaudid Standard PCA) 12 mg STK-MED ONCE IV ;  Start 4/29/20

at 10:50;  Stop 5/1/20 at 11:02;  Status DC


Hydromorphone HCl (Dilaudid Standard PCA) 12 mg STK-MED ONCE IV ;  Start 4/30/20

at 13:47;  Stop 5/1/20 at 11:03;  Status DC


Potassium Acetate 30 meq/Magnesium Sulfate 20 meq/ Calcium Gluconate 10 meq/ 

Multivitamins 10 ml/Chromium/ Copper/Manganese/ Seleni/Zn 0.5 ml/ Insulin Human 

Regular 30 unit/ Potassium Chloride 30 meq/ Total Parenteral Nutrition/Amino 

Acids/Dextrose/ Fat Emulsion Intravenous 1,920 ml @  80 mls/hr TPN  CONT IV  

Last administered on 5/1/20at 22:34;  Start 5/1/20 at 22:00;  Stop 5/2/20 at 

21:59;  Status DC


Potassium Chloride/Water 100 ml @  100 mls/hr Q1H IV  Last administered on 

5/2/20at 13:05;  Start 5/2/20 at 07:00;  Stop 5/2/20 at 10:59;  Status DC


Magnesium Sulfate 50 ml @ 25 mls/hr 1X  ONCE IV  Last administered on 5/2/20at 

10:34;  Start 5/2/20 at 10:30;  Stop 5/2/20 at 12:29;  Status DC


Potassium Chloride 75 meq/ Magnesium Sulfate 20 meq/Calcium Gluconate 10 meq/ 

Multivitamins 10 ml/Chromium/ Copper/Manganese/ Seleni/Zn 0.5 ml/ Insulin Human 

Regular 30 unit/ Total Parenteral Nutrition/Amino Acids/Dextrose/ Fat Emulsion 

Intravenous 1,920 ml @  80 mls/hr TPN  CONT IV  Last administered on 5/2/20at 

21:51;  Start 5/2/20 at 22:00;  Stop 5/3/20 at 22:00;  Status DC


Potassium Chloride 75 meq/ Magnesium Sulfate 20 meq/Calcium Gluconate 10 meq/ 

Multivitamins 10 ml/Chromium/ Copper/Manganese/ Seleni/Zn 0.5 ml/ Insulin Human 

Regular 25 unit/ Total Parenteral Nutrition/Amino Acids/Dextrose/ Fat Emulsion 

Intravenous 1,920 ml @  80 mls/hr TPN  CONT IV  Last administered on 5/3/20at 

22:04;  Start 5/3/20 at 22:00;  Stop 5/4/20 at 21:59;  Status DC


Hydromorphone HCl (Dilaudid) 0.4 mg PRN Q4HRS  PRN IVP PAIN Last administered on

5/4/20at 10:57;  Start 5/4/20 at 09:00;  Stop 5/4/20 at 18:59;  Status DC


Micafungin Sodium 100 mg/Dextrose 100 ml @  100 mls/hr Q24H IV  Last 

administered on 5/26/20at 12:17;  Start 5/4/20 at 11:00;  Stop 5/27/20 at 09:59;

 Status DC


Daptomycin 485 mg/ Sodium Chloride 50 ml @  100 mls/hr Q24H IV  Last 

administered on 5/11/20at 13:10;  Start 5/4/20 at 11:00;  Stop 5/12/20 at 07:44;

 Status DC


Potassium Chloride 75 meq/ Magnesium Sulfate 15 meq/Calcium Gluconate 8 meq/ 

Multivitamins 10 ml/Chromium/ Copper/Manganese/ Seleni/Zn 0.5 ml/ Insulin Human 

Regular 25 unit/ Total Parenteral Nutrition/Amino Acids/Dextrose/ Fat Emulsion 

Intravenous 1,920 ml @  80 mls/hr TPN  CONT IV  Last administered on 5/4/20at 

23:08;  Start 5/4/20 at 22:00;  Stop 5/5/20 at 21:59;  Status DC


Haloperidol Lactate (Haldol Inj) 3 mg 1X  ONCE IVP  Last administered on 

5/4/20at 14:37;  Start 5/4/20 at 14:30;  Stop 5/4/20 at 14:31;  Status DC


Hydromorphone HCl (Dilaudid) 1 mg PRN Q4HRS  PRN IVP PAIN Last administered on 

5/18/20at 06:25;  Start 5/4/20 at 19:00;  Stop 5/18/20 at 17:10;  Status DC


Potassium Chloride 75 meq/ Magnesium Sulfate 15 meq/Calcium Gluconate 8 meq/ 

Multivitamins 10 ml/Chromium/ Copper/Manganese/ Seleni/Zn 0.5 ml/ Insulin Human 

Regular 20 unit/ Total Parenteral Nutrition/Amino Acids/Dextrose/ Fat Emulsion 

Intravenous 1,920 ml @  80 mls/hr TPN  CONT IV  Last administered on 5/5/20at 

22:10;  Start 5/5/20 at 22:00;  Stop 5/6/20 at 21:59;  Status DC


Lidocaine HCl (Buffered Lidocaine 1%) 3 ml STK-MED ONCE .ROUTE ;  Start 5/6/20 

at 11:31;  Stop 5/6/20 at 11:31;  Status DC


Lidocaine HCl (Buffered Lidocaine 1%) 3 ml STK-MED ONCE .ROUTE ;  Start 5/6/20 

at 12:28;  Stop 5/6/20 at 12:29;  Status DC


Lidocaine HCl (Buffered Lidocaine 1%) 6 ml 1X  ONCE INJ  Last administered on 

5/6/20at 12:53;  Start 5/6/20 at 12:45;  Stop 5/6/20 at 12:46;  Status DC


Potassium Chloride 75 meq/ Magnesium Sulfate 15 meq/Calcium Gluconate 8 meq/ 

Multivitamins 10 ml/Chromium/ Copper/Manganese/ Seleni/Zn 0.5 ml/ Insulin Human 

Regular 20 unit/ Total Parenteral Nutrition/Amino Acids/Dextrose/ Fat Emulsion 

Intravenous 1,920 ml @  80 mls/hr TPN  CONT IV  Last administered on 5/6/20at 

22:00;  Start 5/6/20 at 22:00;  Stop 5/7/20 at 21:59;  Status DC


Potassium Chloride 75 meq/ Magnesium Sulfate 15 meq/Calcium Gluconate 8 meq/ 

Multivitamins 10 ml/Chromium/ Copper/Manganese/ Seleni/Zn 0.5 ml/ Insulin Human 

Regular 15 unit/ Total Parenteral Nutrition/Amino Acids/Dextrose/ Fat Emulsion 

Intravenous 1,920 ml @  80 mls/hr TPN  CONT IV  Last administered on 5/7/20at 

22:28;  Start 5/7/20 at 22:00;  Stop 5/8/20 at 21:59;  Status DC


Vecuronium Bromide (Norcuron Bolus) 6 mg PRN Q6HRS  PRN IV VENT ASYNCHRONY;  

Start 5/7/20 at 19:15;  Stop 5/7/20 at 19:35;  Status DC


Bumetanide (Bumex) 2 mg 1X  ONCE IV  Last administered on 5/7/20at 22:09;  Start

5/7/20 at 19:45;  Stop 5/7/20 at 19:46;  Status DC


Lidocaine HCl (Buffered Lidocaine 1%) 3 ml STK-MED ONCE .ROUTE ;  Start 5/8/20 

at 07:59;  Stop 5/8/20 at 07:59;  Status DC


Midazolam HCl (Versed) 5 mg STK-MED ONCE .ROUTE ;  Start 5/8/20 at 08:36;  Stop 

5/8/20 at 08:36;  Status DC


Fentanyl Citrate (Fentanyl 5ml Vial) 250 mcg STK-MED ONCE .ROUTE ;  Start 5/8/20

at 08:36;  Stop 5/8/20 at 08:37;  Status DC


Lidocaine HCl (Buffered Lidocaine 1%) 3 ml 1X  ONCE IJ  Last administered on 

5/8/20at 09:30;  Start 5/8/20 at 09:15;  Stop 5/8/20 at 09:16;  Status DC


Midazolam HCl (Versed) 5 mg 1X  ONCE IV  Last administered on 5/8/20at 09:30;  

Start 5/8/20 at 09:15;  Stop 5/8/20 at 09:16;  Status DC


Fentanyl Citrate (Fentanyl 5ml Vial) 250 mcg 1X  ONCE IV  Last administered on 

5/8/20at 09:30;  Start 5/8/20 at 09:15;  Stop 5/8/20 at 09:16;  Status DC


Bumetanide (Bumex) 2 mg DAILY IV  Last administered on 5/18/20at 08:07;  Start 

5/8/20 at 10:00;  Stop 5/18/20 at 17:15;  Status DC


Potassium Chloride 75 meq/ Magnesium Sulfate 15 meq/ Multivitamins 10 

ml/Chromium/ Copper/Manganese/ Seleni/Zn 0.5 ml/ Insulin Human Regular 15 unit/ 

Total Parenteral Nutrition/Amino Acids/Dextrose/ Fat Emulsion Intravenous 1,920 

ml @  80 mls/hr TPN  CONT IV  Last administered on 5/8/20at 21:59;  Start 5/8/20

at 22:00;  Stop 5/9/20 at 21:59;  Status DC


Metoclopramide HCl (Reglan Vial) 10 mg PRN Q3HRS  PRN IVP NAUSEA/VOMITING-3rd 

choice Last administered on 5/14/20at 04:25;  Start 5/9/20 at 16:45


Potassium Chloride 75 meq/ Magnesium Sulfate 15 meq/ Multivitamins 10 

ml/Chromium/ Copper/Manganese/ Seleni/Zn 0.5 ml/ Insulin Human Regular 15 unit/ 

Total Parenteral Nutrition/Amino Acids/Dextrose/ Fat Emulsion Intravenous 1,920 

ml @  80 mls/hr TPN  CONT IV  Last administered on 5/9/20at 22:41;  Start 5/9/20

at 22:00;  Stop 5/10/20 at 21:59;  Status DC


Magnesium Sulfate 50 ml @ 25 mls/hr 1X  ONCE IV  Last administered on 5/10/20at 

10:44;  Start 5/10/20 at 09:00;  Stop 5/10/20 at 10:59;  Status DC


Potassium Chloride/Water 100 ml @  100 mls/hr 1X  ONCE IV  Last administered on 

5/10/20at 09:37;  Start 5/10/20 at 09:00;  Stop 5/10/20 at 09:59;  Status DC


Duloxetine HCl (Cymbalta) 30 mg DAILY PO  Last administered on 5/11/20at 09:48; 

Start 5/10/20 at 14:00;  Stop 5/13/20 at 10:25;  Status DC


Potassium Chloride 80 meq/ Magnesium Sulfate 20 meq/ Multivitamins 10 

ml/Chromium/ Copper/Manganese/ Seleni/Zn 0.5 ml/ Insulin Human Regular 15 unit/ 

Total Parenteral Nutrition/Amino Acids/Dextrose/ Fat Emulsion Intravenous 1,920 

ml @  80 mls/hr TPN  CONT IV  Last administered on 5/10/20at 21:42;  Start 

5/10/20 at 22:00;  Stop 5/11/20 at 21:59;  Status DC


Potassium Chloride 80 meq/ Magnesium Sulfate 20 meq/ Multivitamins 10 

ml/Chromium/ Copper/Manganese/ Seleni/Zn 0.5 ml/ Insulin Human Regular 15 unit/ 

Total Parenteral Nutrition/Amino Acids/Dextrose/ Fat Emulsion Intravenous 1,920 

ml @  80 mls/hr TPN  CONT IV  Last administered on 5/11/20at 22:20;  Start 

5/11/20 at 22:00;  Stop 5/12/20 at 21:59;  Status DC


Lidocaine HCl (Buffered Lidocaine 1%) 3 ml STK-MED ONCE .ROUTE ;  Start 5/12/20 

at 09:54;  Stop 5/12/20 at 09:55;  Status DC


Hydromorphone HCl (Dilaudid Standard PCA) 12 mg STK-MED ONCE IV ;  Start 5/1/20 

at 15:50;  Stop 5/12/20 at 11:24;  Status DC


Potassium Chloride 80 meq/ Magnesium Sulfate 20 meq/ Multivitamins 10 

ml/Chromium/ Copper/Manganese/ Seleni/Zn 0.5 ml/ Insulin Human Regular 15 unit/ 

Total Parenteral Nutrition/Amino Acids/Dextrose/ Fat Emulsion Intravenous 1,920 

ml @  80 mls/hr TPN  CONT IV  Last administered on 5/12/20at 21:40;  Start 

5/12/20 at 22:00;  Stop 5/13/20 at 21:59;  Status DC


Lidocaine HCl (Buffered Lidocaine 1%) 6 ml 1X  ONCE INJ  Last administered on 

5/12/20at 14:15;  Start 5/12/20 at 14:15;  Stop 5/12/20 at 14:16;  Status DC


Potassium Chloride 80 meq/ Magnesium Sulfate 20 meq/ Multivitamins 10 

ml/Chromium/ Copper/Manganese/ Seleni/Zn 1 ml/ Insulin Human Regular 15 unit/ 

Total Parenteral Nutrition/Amino Acids/Dextrose/ Fat Emulsion Intravenous 1,920 

ml @  80 mls/hr TPN  CONT IV  Last administered on 5/13/20at 22:04;  Start 

5/13/20 at 22:00;  Stop 5/14/20 at 21:59;  Status DC


Potassium Chloride/Water 100 ml @  100 mls/hr 1X  ONCE IV  Last administered on 

5/14/20at 11:34;  Start 5/14/20 at 11:00;  Stop 5/14/20 at 11:59;  Status DC


Potassium Chloride 90 meq/ Magnesium Sulfate 20 meq/ Multivitamins 10 

ml/Chromium/ Copper/Manganese/ Seleni/Zn 1 ml/ Insulin Human Regular 15 unit/ 

Total Parenteral Nutrition/Amino Acids/Dextrose/ Fat Emulsion Intravenous 1,920 

ml @  80 mls/hr TPN  CONT IV  Last administered on 5/14/20at 22:57;  Start 

5/14/20 at 22:00;  Stop 5/15/20 at 21:59;  Status DC


Potassium Chloride 90 meq/ Magnesium Sulfate 20 meq/ Multivitamins 10 

ml/Chromium/ Copper/Manganese/ Seleni/Zn 1 ml/ Insulin Human Regular 15 unit/ 

Total Parenteral Nutrition/Amino Acids/Dextrose/ Fat Emulsion Intravenous 1,920 

ml @  80 mls/hr TPN  CONT IV  Last administered on 5/15/20at 22:48;  Start 

5/15/20 at 22:00;  Stop 5/16/20 at 21:59;  Status DC


Potassium Chloride 90 meq/ Magnesium Sulfate 20 meq/ Multivitamins 10 

ml/Chromium/ Copper/Manganese/ Seleni/Zn 1 ml/ Insulin Human Regular 15 unit/ 

Total Parenteral Nutrition/Amino Acids/Dextrose/ Fat Emulsion Intravenous 1,890 

ml @  78.75 mls/ hr TPN  CONT IV  Last administered on 5/16/20at 22:15;  Start 

5/16/20 at 22:00;  Stop 5/17/20 at 21:59;  Status DC


Linezolid/Dextrose 300 ml @  300 mls/hr Q12HR IV  Last administered on 5/19/20at

21:08;  Start 5/17/20 at 09:00;  Stop 5/20/20 at 08:11;  Status DC


Daptomycin 450 mg/ Sodium Chloride 50 ml @  100 mls/hr Q24H IV  Last 

administered on 5/20/20at 09:25;  Start 5/17/20 at 09:00;  Stop 5/21/20 at 

08:30;  Status DC


Potassium Chloride 90 meq/ Magnesium Sulfate 20 meq/ Multivitamins 10 

ml/Chromium/ Copper/Manganese/ Seleni/Zn 1 ml/ Insulin Human Regular 15 unit/ 

Total Parenteral Nutrition/Amino Acids/Dextrose/ Fat Emulsion Intravenous 1,890 

ml @  78.75 mls/ hr TPN  CONT IV  Last administered on 5/17/20at 21:34;  Start 

5/17/20 at 22:00;  Stop 5/18/20 at 21:59;  Status DC


Lorazepam (Ativan Inj) 2 mg STK-MED ONCE .ROUTE ;  Start 5/17/20 at 14:58;  Stop

5/17/20 at 14:58;  Status DC


Metoprolol Tartrate (Lopressor Vial) 5 mg 1X  ONCE IVP  Last administered on 

5/17/20at 15:31;  Start 5/17/20 at 15:15;  Stop 5/17/20 at 15:16;  Status DC


Lorazepam (Ativan Inj) 2 mg 1X  ONCE IVP  Last administered on 5/17/20at 15:30; 

Start 5/17/20 at 15:15;  Stop 5/17/20 at 15:16;  Status DC


Enoxaparin Sodium (Lovenox 40mg Syringe) 40 mg Q24H SQ  Last administered on 

6/5/20at 17:44;  Start 5/17/20 at 17:00;  Stop 6/7/20 at 06:50;  Status DC


Lorazepam (Ativan Inj) 1 mg PRN Q4HRS  PRN IVP ANXIETY / AGITATION MILD-MOD Last

administered on 5/31/20at 15:55;  Start 5/17/20 at 19:15;  Stop 6/2/20 at 11:45;

 Status DC


Lorazepam (Ativan Inj) 2 mg PRN Q4HRS  PRN IVP ANXIETY / AGITATION SEVERE Last 

administered on 6/1/20at 07:55;  Start 5/17/20 at 19:15;  Stop 6/2/20 at 11:45; 

Status DC


Fentanyl Citrate (Fentanyl 2ml Vial) 50 mcg PRN Q4HRS  PRN IVP SEVERE PAIN Last 

administered on 6/13/20at 05:15;  Start 5/18/20 at 13:15;  Stop 6/14/20 at 

09:29;  Status DC


Fentanyl Citrate (Fentanyl 2ml Vial) 25 mcg PRN Q4HRS  PRN IVP MODERATE PAIN 

Last administered on 6/13/20at 00:27;  Start 5/18/20 at 13:15;  Stop 6/14/20 at 

09:30;  Status DC


Potassium Chloride 90 meq/ Magnesium Sulfate 20 meq/ Multivitamins 10 m

l/Chromium/ Copper/Manganese/ Seleni/Zn 1 ml/ Insulin Human Regular 15 unit/ 

Total Parenteral Nutrition/Amino Acids/Dextrose/ Fat Emulsion Intravenous 1,890 

ml @  78.75 mls/ hr TPN  CONT IV  Last administered on 5/18/20at 22:18;  Start 

5/18/20 at 22:00;  Stop 5/19/20 at 21:59;  Status DC


Furosemide (Lasix) 40 mg 1X  ONCE IVP  Last administered on 5/18/20at 21:51;  

Start 5/18/20 at 21:45;  Stop 5/18/20 at 21:48;  Status DC


Albumin Human 100 ml @  100 mls/hr 1X PRN  PRN IV SEE COMMENTS;  Start 5/19/20 

at 01:30


Furosemide (Lasix) 40 mg [id number] IVP  Last administered on 6/3/20at 08:04;  Start 

5/19/20 at 14:00;  Stop 6/3/20 at 13:07;  Status DC


Potassium Chloride 90 meq/ Magnesium Sulfate 20 meq/ Multivitamins 10 

ml/Chromium/ Copper/Manganese/ Seleni/Zn 1 ml/ Insulin Human Regular 15 unit/ 

Total Parenteral Nutrition/Amino Acids/Dextrose/ Fat Emulsion Intravenous 1,800 

ml @  75 mls/hr TPN  CONT IV  Last administered on 5/19/20at 22:31;  Start 

5/19/20 at 22:00;  Stop 5/20/20 at 21:59;  Status DC


Potassium Chloride 90 meq/ Magnesium Sulfate 20 meq/ Multivitamins 10 

ml/Chromium/ Copper/Manganese/ Seleni/Zn 1 ml/ Insulin Human Regular 15 unit/ To

chely Parenteral Nutrition/Amino Acids/Dextrose/ Fat Emulsion Intravenous 1,800 ml

@  75 mls/hr TPN  CONT IV  Last administered on 5/20/20at 22:28;  Start 5/20/20 

at 22:00;  Stop 5/21/20 at 21:59;  Status DC


Potassium Chloride 110 meq/ Magnesium Sulfate 20 meq/ Multivitamins 10 

ml/Chromium/ Copper/Manganese/ Seleni/Zn 1 ml/ Insulin Human Regular 15 unit/ 

Total Parenteral Nutrition/Amino Acids/Dextrose/ Fat Emulsion Intravenous 1,800 

ml @  75 mls/hr TPN  CONT IV  Last administered on 5/21/20at 22:01;  Start 5/ 21/20 at 22:00;  Stop 5/22/20 at 21:59;  Status DC


Saliva Substitute (Biotene Moisturizing Mouth) 2 spray PRN Q15MIN  PRN PO DRY 

MOUTH;  Start 5/21/20 at 11:00


Potassium Chloride 110 meq/ Magnesium Sulfate 20 meq/ Multivitamins 10 

ml/Chromium/ Copper/Manganese/ Seleni/Zn 1 ml/ Insulin Human Regular 15 unit/ 

Total Parenteral Nutrition/Amino Acids/Dextrose/ Fat Emulsion Intravenous 1,800 

ml @  75 mls/hr TPN  CONT IV  Last administered on 5/22/20at 22:21;  Start 

5/22/20 at 22:00;  Stop 5/23/20 at 21:59;  Status DC


Potassium Chloride 110 meq/ Magnesium Sulfate 20 meq/ Multivitamins 10 

ml/Chromium/ Copper/Manganese/ Seleni/Zn 1 ml/ Insulin Human Regular 15 unit/ 

Total Parenteral Nutrition/Amino Acids/Dextrose/ Fat Emulsion Intravenous 1,800 

ml @  75 mls/hr TPN  CONT IV  Last administered on 5/23/20at 22:04;  Start 

5/23/20 at 22:00;  Stop 5/24/20 at 21:59;  Status DC


Potassium Chloride 110 meq/ Magnesium Sulfate 20 meq/ Multivitamins 10 

ml/Chromium/ Copper/Manganese/ Seleni/Zn 1 ml/ Insulin Human Regular 15 unit/ T

otal Parenteral Nutrition/Amino Acids/Dextrose/ Fat Emulsion Intravenous 1,800 

ml @  75 mls/hr TPN  CONT IV  Last administered on 5/24/20at 22:48;  Start 

5/24/20 at 22:00;  Stop 5/25/20 at 21:59;  Status DC


Potassium Chloride 70 meq/ Magnesium Sulfate 20 meq/ Multivitamins 10 

ml/Chromium/ Copper/Manganese/ Seleni/Zn 1 ml/ Insulin Human Regular 15 unit/ 

Total Parenteral Nutrition/Amino Acids/Dextrose/ Fat Emulsion Intravenous 1,800 

ml @  75 mls/hr TPN  CONT IV  Last administered on 5/25/20at 21:39;  Start 5/ 25/20 at 22:00;  Stop 5/26/20 at 21:59;  Status DC


Meropenem 500 mg/ Sodium Chloride 50 ml @  100 mls/hr Q6HRS IV  Last administ

ered on 5/27/20at 06:02;  Start 5/25/20 at 18:00;  Stop 5/27/20 at 09:59;  

Status DC


Barium Sulfate (Varibar Thin Liquid Apple) 148 gm 1X  ONCE PO ;  Start 5/26/20 

at 11:45;  Stop 5/26/20 at 11:49;  Status DC


Potassium Chloride 70 meq/ Magnesium Sulfate 20 meq/ Multivitamins 10 

ml/Chromium/ Copper/Manganese/ Seleni/Zn 1 ml/ Insulin Human Regular 15 unit/ 

Total Parenteral Nutrition/Amino Acids/Dextrose/ Fat Emulsion Intravenous 1,800 

ml @  75 mls/hr TPN  CONT IV  Last administered on 5/26/20at 22:27;  Start 

5/26/20 at 22:00;  Stop 5/27/20 at 21:59;  Status DC


Piperacillin Sod/ Tazobactam Sod 3.375 gm/Sodium Chloride 50 ml @  100 mls/hr 

Q6HRS IV  Last administered on 6/4/20at 06:10;  Start 5/27/20 at 12:00;  Stop 

6/4/20 at 07:26;  Status DC


Potassium Chloride 70 meq/ Magnesium Sulfate 20 meq/ Multivitamins 10 

ml/Chromium/ Copper/Manganese/ Seleni/Zn 1 ml/ Insulin Human Regular 15 unit/ 

Total Parenteral Nutrition/Amino Acids/Dextrose/ Fat Emulsion Intravenous 1,800 

ml @  75 mls/hr TPN  CONT IV  Last administered on 5/27/20at 22:03;  Start 

5/27/20 at 22:00;  Stop 5/28/20 at 21:59;  Status DC


Potassium Chloride 70 meq/ Magnesium Sulfate 20 meq/ Multivitamins 10 

ml/Chromium/ Copper/Manganese/ Seleni/Zn 1 ml/ Insulin Human Regular 15 unit/ 

Total Parenteral Nutrition/Amino Acids/Dextrose/ Fat Emulsion Intravenous 1,800 

ml @  75 mls/hr TPN  CONT IV  Last administered on 5/28/20at 22:33;  Start 

5/28/20 at 22:00;  Stop 5/29/20 at 21:59;  Status DC


Potassium Chloride 70 meq/ Magnesium Sulfate 20 meq/ Multivitamins 10 ml/Ch

romium/ Copper/Manganese/ Seleni/Zn 1 ml/ Insulin Human Regular 15 unit/ Total 

Parenteral Nutrition/Amino Acids/Dextrose/ Fat Emulsion Intravenous 1,800 ml @  

75 mls/hr TPN  CONT IV  Last administered on 5/29/20at 23:13;  Start 5/29/20 at 

22:00;  Stop 5/30/20 at 21:59;  Status DC


Potassium Chloride 80 meq/ Magnesium Sulfate 20 meq/ Multivitamins 10 

ml/Chromium/ Copper/Manganese/ Seleni/Zn 1 ml/ Insulin Human Regular 15 unit/ 

Total Parenteral Nutrition/Amino Acids/Dextrose/ Fat Emulsion Intravenous 1,800 

ml @  75 mls/hr TPN  CONT IV  Last administered on 5/30/20at 22:30;  Start 

5/30/20 at 22:00;  Stop 5/31/20 at 21:59;  Status DC


Potassium Chloride 80 meq/ Magnesium Sulfate 20 meq/ Multivitamins 10 

ml/Chromium/ Copper/Manganese/ Seleni/Zn 1 ml/ Insulin Human Regular 15 unit/ 

Total Parenteral Nutrition/Amino Acids/Dextrose/ Fat Emulsion Intravenous 1,800 

ml @  75 mls/hr TPN  CONT IV  Last administered on 5/31/20at 21:54;  Start 

5/31/20 at 22:00;  Stop 6/1/20 at 21:59;  Status DC


Potassium Chloride/Water 100 ml @  100 mls/hr 1X  ONCE IV  Last administered on 

6/1/20at 10:15;  Start 6/1/20 at 10:00;  Stop 6/1/20 at 10:59;  Status DC


Potassium Chloride 90 meq/ Magnesium Sulfate 20 meq/ Multivitamins 10 

ml/Chromium/ Copper/Manganese/ Seleni/Zn 1 ml/ Insulin Human Regular 20 unit/ 

Total Parenteral Nutrition/Amino Acids/Dextrose/ Fat Emulsion Intravenous 1,800 

ml @  75 mls/hr TPN  CONT IV  Last administered on 6/1/20at 22:28;  Start 6/1/20

at 22:00;  Stop 6/2/20 at 21:59;  Status DC


Potassium Chloride 90 meq/ Magnesium Sulfate 20 meq/ Multivitamins 10 

ml/Chromium/ Copper/Manganese/ Seleni/Zn 1 ml/ Insulin Human Regular 20 unit/ 

Total Parenteral Nutrition/Amino Acids/Dextrose/ Fat Emulsion Intravenous 1,800 

ml @  75 mls/hr TPN  CONT IV  Last administered on 6/2/20at 22:08;  Start 6/2/20

at 22:00;  Stop 6/3/20 at 21:59;  Status DC


Lorazepam (Ativan Inj) 0.25 mg PRN Q4HRS  PRN IVP ANXIETY / AGITATION Last 

administered on 7/12/20at 00:27;  Start 6/3/20 at 07:30


Potassium Chloride 90 meq/ Magnesium Sulfate 20 meq/ Multivitamins 10 

ml/Chromium/ Copper/Manganese/ Seleni/Zn 1 ml/ Insulin Human Regular 20 unit/ 

Total Parenteral Nutrition/Amino Acids/Dextrose/ Fat Emulsion Intravenous 1,800 

ml @  75 mls/hr TPN  CONT IV  Last administered on 6/3/20at 23:13;  Start 6/3/20

at 22:00;  Stop 6/4/20 at 21:59;  Status DC


Furosemide (Lasix) 40 mg DAILY IVP  Last administered on 6/5/20at 11:14;  Start 

6/3/20 at 13:30;  Stop 6/7/20 at 09:12;  Status DC


Fluoxetine HCl (PROzac) 20 mg QHS PEG  Last administered on 7/13/20at 21:19;  

Start 6/4/20 at 21:00


Fentanyl (Duragesic 50mcg/ Hr Patch) 1 patch Q72H TD  Last administered on 

6/4/20at 21:22;  Start 6/4/20 at 21:00;  Stop 6/13/20 at 12:00;  Status DC


Potassium Chloride 40 meq/ Potassium Acetate 60 meq/Magnesium Sulfate 10 meq/ 

Multivitamins 10 ml/Chromium/ Copper/Manganese/ Seleni/Zn 1 ml/ Insulin Human 

Regular 20 unit/ Total Parenteral Nutrition/Amino Acids/Dextrose/ Fat Emulsion 

Intravenous 1,800 ml @  75 mls/hr TPN  CONT IV  Last administered on 6/5/20at 

00:03;  Start 6/4/20 at 22:00;  Stop 6/5/20 at 21:59;  Status DC


Potassium Acetate 80 meq/Magnesium Sulfate 5 meq/ Multivitamins 10 ml/Chromium/ 

Copper/Manganese/ Seleni/Zn 1 ml/ Insulin Human Regular 20 unit/ Total 

Parenteral Nutrition/Amino Acids/Dextrose/ Fat Emulsion Intravenous 1,920 ml @  

80 mls/hr TPN  CONT IV  Last administered on 6/5/20at 21:59;  Start 6/5/20 at 

22:00;  Stop 6/6/20 at 21:59;  Status DC


Potassium Acetate 60 meq/Magnesium Sulfate 5 meq/ Multivitamins 10 ml/Chromium/ 

Copper/Manganese/ Seleni/Zn 1 ml/ Insulin Human Regular 30 unit/ Total 

Parenteral Nutrition/Amino Acids/Dextrose/ Fat Emulsion Intravenous 1,920 ml @  

80 mls/hr TPN  CONT IV  Last administered on 6/6/20at 21:54;  Start 6/6/20 at 

22:00;  Stop 6/7/20 at 21:59;  Status DC


Norepinephrine Bitartrate 8 mg/ Dextrose 258 ml @  13.332 mls/ hr CONT  PRN IV 

PER PROTOCOL Last administered on 7/2/20at 09:09;  Start 6/7/20 at 06:30


Albumin Human 500 ml @  125 mls/hr 1X  ONCE IV  Last administered on 6/7/20at 

08:10;  Start 6/7/20 at 08:15;  Stop 6/7/20 at 12:14;  Status DC


Potassium Acetate 40 meq/Magnesium Sulfate 5 meq/ Multivitamins 10 ml/Chromium/ 

Copper/Manganese/ Seleni/Zn 1 ml/ Insulin Human Regular 30 unit/ Total 

Parenteral Nutrition/Amino Acids/Dextrose/ Fat Emulsion Intravenous 1,920 ml @  

80 mls/hr TPN  CONT IV  Last administered on 6/7/20at 22:23;  Start 6/7/20 at 

22:00;  Stop 6/8/20 at 21:59;  Status DC


Meropenem 1 gm/ Sodium Chloride 100 ml @  200 mls/hr Q8HRS IV ;  Start 6/7/20 at

14:00;  Status Cancel


Meropenem 1 gm/ Sodium Chloride 100 ml @  200 mls/hr Q8HRS IV  Last administered

on 6/7/20at 11:04;  Start 6/7/20 at 10:00;  Stop 6/7/20 at 13:00;  Status DC


Meropenem 1 gm/ Sodium Chloride 100 ml @  200 mls/hr Q12HR IV  Last administered

on 6/25/20at 08:27;  Start 6/7/20 at 21:00;  Stop 6/25/20 at 08:56;  Status DC


Sodium Chloride 1,000 ml @  1,000 mls/hr 1X  ONCE IV  Last administered on 

6/7/20at 11:06;  Start 6/7/20 at 10:45;  Stop 6/7/20 at 11:44;  Status DC


Micafungin Sodium 100 mg/Dextrose 100 ml @  100 mls/hr Q24H IV  Last 

administered on 6/24/20at 12:34;  Start 6/7/20 at 11:00;  Stop 6/25/20 at 08:56;

 Status DC


Daptomycin 410 mg/ Sodium Chloride 50 ml @  100 mls/hr Q24H IV  Last administ

ered on 6/9/20at 13:33;  Start 6/7/20 at 14:00;  Stop 6/10/20 at 08:30;  Status 

DC


Midazolam HCl (Versed) 2 mg STK-MED ONCE .ROUTE ;  Start 6/7/20 at 14:47;  Stop 

6/7/20 at 14:48;  Status DC


Fentanyl Citrate (Fentanyl 2ml Vial) 100 mcg STK-MED ONCE .ROUTE ;  Start 6/7/20

at 14:47;  Stop 6/7/20 at 14:48;  Status DC


Flumazenil (Romazicon) 0.5 mg STK-MED ONCE IV ;  Start 6/7/20 at 14:48;  Stop 

6/7/20 at 14:48;  Status DC


Naloxone HCl (Narcan) 0.4 mg STK-MED ONCE .ROUTE ;  Start 6/7/20 at 14:48;  Stop

6/7/20 at 14:48;  Status DC


Lidocaine HCl (Lidocaine 1% 20ml Vial) 20 ml STK-MED ONCE .ROUTE ;  Start 6/7/20

at 14:48;  Stop 6/7/20 at 14:48;  Status DC


Midazolam HCl (Versed) 2 mg 1X  ONCE IV  Last administered on 6/7/20at 15:28;  

Start 6/7/20 at 15:00;  Stop 6/7/20 at 15:01;  Status DC


Fentanyl Citrate (Fentanyl 2ml Vial) 100 mcg 1X  ONCE IV  Last administered on 

6/7/20at 15:28;  Start 6/7/20 at 15:00;  Stop 6/7/20 at 15:01;  Status DC


Lidocaine HCl (Lidocaine 1% 20ml Vial) 20 ml 1X  ONCE INJ  Last administered on 

6/7/20at 15:30;  Start 6/7/20 at 15:00;  Stop 6/7/20 at 15:01;  Status DC


Sodium Chloride 1,000 ml @  100 mls/hr Q10H IV  Last administered on 6/16/20at 

07:30;  Start 6/7/20 at 20:00;  Stop 6/16/20 at 11:26;  Status DC


Sodium Bicarbonate (Sodium Bicarb Adult 8.4% Syr) 50 meq 1X  ONCE IV  Last 

administered on 6/7/20at 21:47;  Start 6/7/20 at 22:00;  Stop 6/7/20 at 22:01;  

Status DC


Potassium Acetate 40 meq/Magnesium Sulfate 5 meq/ Multivitamins 10 ml/Chromium/ 

Copper/Manganese/ Seleni/Zn 1 ml/ Insulin Human Regular 30 unit/ Total 

Parenteral Nutrition/Amino Acids/Dextrose/ Fat Emulsion Intravenous 1,920 ml @  

80 mls/hr TPN  CONT IV  Last administered on 6/8/20at 22:28;  Start 6/8/20 at 

22:00;  Stop 6/9/20 at 21:59;  Status DC


Sodium Chloride 500 ml @  500 mls/hr 1X  ONCE IV  Last administered on 6/9/20at 

06:39;  Start 6/9/20 at 06:45;  Stop 6/9/20 at 07:44;  Status DC


Potassium Acetate 40 meq/Magnesium Sulfate 5 meq/ Multivitamins 10 ml/Chromium/ 

Copper/Manganese/ Seleni/Zn 1 ml/ Insulin Human Regular 30 unit/ Total 

Parenteral Nutrition/Amino Acids/Dextrose/ Fat Emulsion Intravenous 1,920 ml @  

80 mls/hr TPN  CONT IV  Last administered on 6/9/20at 22:03;  Start 6/9/20 at 

22:00;  Stop 6/10/20 at 21:59;  Status DC


Metoprolol Tartrate (Lopressor Vial) 5 mg PRN Q6HRS  PRN IVP HYPERTENSION Last 

administered on 7/12/20at 18:01;  Start 6/10/20 at 09:00


Potassium Acetate 40 meq/Magnesium Sulfate 5 meq/ Multivitamins 10 ml/Chromium/ 

Copper/Manganese/ Seleni/Zn 1 ml/ Insulin Human Regular 30 unit/ Total 

Parenteral Nutrition/Amino Acids/Dextrose/ Fat Emulsion Intravenous 1,920 ml @  

80 mls/hr TPN  CONT IV  Last administered on 6/10/20at 21:26;  Start 6/10/20 at 

22:00;  Stop 6/11/20 at 21:59;  Status DC


Potassium Acetate 40 meq/Magnesium Sulfate 5 meq/ Multivitamins 10 ml/Chromium/ 

Copper/Manganese/ Seleni/Zn 1 ml/ Insulin Human Regular 30 unit/ Total 

Parenteral Nutrition/Amino Acids/Dextrose/ Fat Emulsion Intravenous 1,920 ml @  

80 mls/hr TPN  CONT IV  Last administered on 6/11/20at 23:23;  Start 6/11/20 at 

22:00;  Stop 6/12/20 at 21:59;  Status DC


Potassium Acetate 40 meq/Magnesium Sulfate 5 meq/ Multivitamins 10 ml/Chromium/ 

Copper/Manganese/ Seleni/Zn 1 ml/ Insulin Human Regular 30 unit/ Total 

Parenteral Nutrition/Amino Acids/Dextrose/ Fat Emulsion Intravenous 1,920 ml @  

80 mls/hr TPN  CONT IV  Last administered on 6/12/20at 21:35;  Start 6/12/20 at 

22:00;  Stop 6/13/20 at 21:59;  Status DC


Furosemide (Lasix) 20 mg 1X  ONCE IVP  Last administered on 6/13/20at 06:26;  

Start 6/13/20 at 06:15;  Stop 6/13/20 at 06:16;  Status DC


Methylprednisolone Sodium Succinate (SOLU-Medrol 125MG VIAL) 125 mg 1X  ONCE IV 

Last administered on 6/13/20at 06:26;  Start 6/13/20 at 06:15;  Stop 6/13/20 at 

06:16;  Status DC


Albuterol/ Ipratropium (Duoneb) 3 ml Q4HRS NEB  Last administered on 7/14/20at 

12:40;  Start 6/13/20 at 08:00


Fentanyl Citrate 30 ml @ 0 mls/hr CONT  PRN IV SEE PROTOCOL Last administered on

7/4/20at 08:03;  Start 6/13/20 at 06:00;  Stop 7/4/20 at 12:42;  Status DC


Propofol 100 ml @ 0 mls/hr CONT  PRN IV SEE PROTOCOL Last administered on 

6/20/20at 23:50;  Start 6/13/20 at 06:00


Fentanyl Citrate (Fentanyl 2ml Vial) 25 mcg PRN Q1HR  PRN IV SEE COMMENTS Last 

administered on 7/14/20at 13:08;  Start 6/13/20 at 06:00


Fentanyl Citrate (Fentanyl 2ml Vial) 50 mcg PRN Q1HR  PRN IV SEE COMMENTS Last 

administered on 7/12/20at 18:02;  Start 6/13/20 at 06:00


Chlorhexidine Gluconate (Peridex) 15 ml BID MM ;  Start 6/13/20 at 09:00;  Stop 

6/13/20 at 07:58;  Status DC


Potassium Acetate 40 meq/Magnesium Sulfate 5 meq/ Multivitamins 10 ml/Chromium/ 

Copper/Manganese/ Seleni/Zn 1 ml/ Insulin Human Regular 30 unit/ Total 

Parenteral Nutrition/Amino Acids/Dextrose/ Fat Emulsion Intravenous 1,920 ml @  

80 mls/hr TPN  CONT IV  Last administered on 6/13/20at 21:19;  Start 6/13/20 at 

22:00;  Stop 6/14/20 at 21:59;  Status DC


Acetylcysteine (Mucomyst 20% Resp Treatment) 600 mg BID NEB  Last administered 

on 6/19/20at 09:33;  Start 6/13/20 at 21:00;  Stop 6/19/20 at 10:39;  Status DC


Magnesium Sulfate 100 ml @  25 mls/hr 1X  ONCE IV  Last administered on 6/13/20a

t 15:48;  Start 6/13/20 at 15:45;  Stop 6/13/20 at 19:44;  Status DC


Potassium Acetate 40 meq/Magnesium Sulfate 5 meq/ Multivitamins 10 ml/Chromium/ 

Copper/Manganese/ Seleni/Zn 1 ml/ Insulin Human Regular 30 unit/ Total 

Parenteral Nutrition/Amino Acids/Dextrose/ Fat Emulsion Intravenous 1,920 ml @  

80 mls/hr TPN  CONT IV  Last administered on 6/14/20at 21:35;  Start 6/14/20 at 

22:00;  Stop 6/15/20 at 21:59;  Status DC


Potassium Chloride/Water 100 ml @  100 mls/hr Q1H IV  Last administered on 

6/15/20at 08:31;  Start 6/15/20 at 07:00;  Stop 6/15/20 at 08:59;  Status DC


Potassium Acetate 40 meq/Magnesium Sulfate 5 meq/ Multivitamins 10 ml/Chromium/ 

Copper/Manganese/ Seleni/Zn 1 ml/ Insulin Human Regular 30 unit/ Total 

Parenteral Nutrition/Amino Acids/Dextrose/ Fat Emulsion Intravenous 1,920 ml @  

80 mls/hr TPN  CONT IV  Last administered on 6/15/20at 21:54;  Start 6/15/20 at 

22:00;  Stop 6/16/20 at 19:34;  Status DC


Lidocaine HCl (Buffered Lidocaine 1%) 3 ml STK-MED ONCE .ROUTE ;  Start 6/15/20 

at 12:14;  Stop 6/15/20 at 12:14;  Status DC


Lidocaine HCl (Buffered Lidocaine 1%) 3 ml 1X  ONCE IJ  Last administered on 

6/15/20at 13:11;  Start 6/15/20 at 13:00;  Stop 6/15/20 at 13:01;  Status DC


Magnesium Sulfate 50 ml @ 25 mls/hr 1X  ONCE IV ;  Start 6/16/20 at 08:15;  Stop

6/16/20 at 10:14;  Status DC


Potassium Acetate 40 meq/Magnesium Sulfate 10 meq/ Multivitamins 10 ml/Chromium/

Copper/Manganese/ Seleni/Zn 1 ml/ Insulin Human Regular 20 unit/ Total 

Parenteral Nutrition/Amino Acids/Dextrose/ Fat Emulsion Intravenous 1,920 ml @  

80 mls/hr TPN  CONT IV  Last administered on 6/16/20at 21:32;  Start 6/16/20 at 

22:00;  Stop 6/17/20 at 21:59;  Status DC


Potassium Chloride/Water 100 ml @  100 mls/hr Q1H IV  Last administered on 

6/17/20at 09:12;  Start 6/17/20 at 08:00;  Stop 6/17/20 at 09:59;  Status DC


Alteplase, Recombinant (Cathflo For Central Catheter Clearance) 4 mg 1X  ONCE 

INT CAT ;  Start 6/17/20 at 09:15;  Stop 6/17/20 at 09:16;  Status UNV


Alteplase, Recombinant (Cathflo For Central Catheter Clearance) 4 mg 1X  ONCE 

INT CAT ;  Start 6/17/20 at 09:15;  Stop 6/17/20 at 09:16;  Status UNV


Alteplase, Recombinant (Cathflo For Central Catheter Clearance) 4 mg 1X  ONCE 

INT CAT ;  Start 6/17/20 at 09:15;  Stop 6/17/20 at 09:16;  Status UNV


Alteplase, Recombinant 4 mg/ Sodium Chloride 20 ml @ 20 mls/hr 1X  ONCE IV  Last

administered on 6/17/20at 10:10;  Start 6/17/20 at 10:00;  Stop 6/17/20 at 

10:59;  Status DC


Alteplase, Recombinant 4 mg/ Sodium Chloride 20 ml @ 20 mls/hr 1X  ONCE IV  Last

administered on 6/17/20at 10:09;  Start 6/17/20 at 10:00;  Stop 6/17/20 at 

10:59;  Status DC


Alteplase, Recombinant 4 mg/ Sodium Chloride 20 ml @ 20 mls/hr 1X  ONCE IV  Last

administered on 6/17/20at 10:09;  Start 6/17/20 at 10:00;  Stop 6/17/20 at 

10:59;  Status DC


Potassium Acetate 60 meq/Magnesium Sulfate 10 meq/ Multivitamins 10 ml/Chromium/

Copper/Manganese/ Seleni/Zn 1 ml/ Insulin Human Regular 20 unit/ Total P

arenteral Nutrition/Amino Acids/Dextrose/ Fat Emulsion Intravenous 1,920 ml @  

80 mls/hr TPN  CONT IV  Last administered on 6/17/20at 21:55;  Start 6/17/20 at 

22:00;  Stop 6/18/20 at 21:59;  Status DC


Albumin Human 500 ml @  125 mls/hr 1X  ONCE IV  Last administered on 6/18/20at 

12:01;  Start 6/18/20 at 11:15;  Stop 6/18/20 at 15:14;  Status DC


Sodium Chloride 500 ml @  500 mls/hr 1X  ONCE IV  Last administered on 6/18/20at

13:50;  Start 6/18/20 at 11:15;  Stop 6/18/20 at 12:14;  Status DC


Potassium Acetate 60 meq/Magnesium Sulfate 14 meq/ Multivitamins 10 ml/Chromium/

Copper/Manganese/ Seleni/Zn 1 ml/ Insulin Human Regular 20 unit/ Total 

Parenteral Nutrition/Amino Acids/Dextrose/ Fat Emulsion Intravenous 1,920 ml @  

80 mls/hr TPN  CONT IV  Last administered on 6/18/20at 22:26;  Start 6/18/20 at 

22:00;  Stop 6/19/20 at 21:59;  Status DC


Ciprofloxacin/ Dextrose 200 ml @  200 mls/hr Q12HR IV  Last administered on 

6/25/20at 08:27;  Start 6/18/20 at 21:00;  Stop 6/25/20 at 08:56;  Status DC


Albumin Human 250 ml @  62.5 mls/hr 1X  ONCE IV  Last administered on 6/19/20at 

11:09;  Start 6/19/20 at 11:00;  Stop 6/19/20 at 14:59;  Status DC


Furosemide (Lasix) 20 mg 1X  ONCE IVP  Last administered on 6/19/20at 14:52;  

Start 6/19/20 at 10:45;  Stop 6/19/20 at 10:49;  Status DC


Potassium Acetate 60 meq/Magnesium Sulfate 14 meq/ Multivitamins 10 ml/Chromium/

Copper/Manganese/ Seleni/Zn 1 ml/ Insulin Human Regular 15 unit/ Total 

Parenteral Nutrition/Amino Acids/Dextrose/ Fat Emulsion Intravenous 1,920 ml @  

80 mls/hr TPN  CONT IV  Last administered on 6/19/20at 22:08;  Start 6/19/20 at 

22:00;  Stop 6/20/20 at 21:59;  Status DC


Potassium Acetate 60 meq/Magnesium Sulfate 14 meq/ Multivitamins 10 ml/Chromium/

Copper/Manganese/ Seleni/Zn 1 ml/ Insulin Human Regular 15 unit/ Total 

Parenteral Nutrition/Amino Acids/Dextrose/ Fat Emulsion Intravenous 1,920 ml @  

80 mls/hr TPN  CONT IV  Last administered on 6/20/20at 22:12;  Start 6/20/20 at 

22:00;  Stop 6/21/20 at 21:59;  Status DC


Potassium Acetate 60 meq/Magnesium Sulfate 14 meq/ Multivitamins 10 ml/Chromium/

Copper/Manganese/ Seleni/Zn 1 ml/ Insulin Human Regular 15 unit/ Total 

Parenteral Nutrition/Amino Acids/Dextrose/ Fat Emulsion Intravenous 1,920 ml @  

80 mls/hr TPN  CONT IV  Last administered on 6/21/20at 22:22;  Start 6/21/20 at 

22:00;  Stop 6/22/20 at 21:59;  Status DC


Furosemide (Lasix) 20 mg 1X  ONCE IVP  Last administered on 6/22/20at 11:07;  

Start 6/22/20 at 10:30;  Stop 6/22/20 at 10:34;  Status DC


Potassium Acetate 60 meq/Magnesium Sulfate 14 meq/ Multivitamins 10 ml/Chromium/

Copper/Manganese/ Seleni/Zn 1 ml/ Insulin Human Regular 15 unit/ Sodium Chloride

20 meq/Total Parenteral Nutrition/Amino Acids/Dextrose/ Fat Emulsion Intravenous

1,920 ml @  80 mls/hr TPN  CONT IV  Last administered on 6/22/20at 21:54;  Start

6/22/20 at 22:00;  Stop 6/23/20 at 21:59;  Status DC


Potassium Acetate 30 meq/Magnesium Sulfate 14 meq/ Multivitamins 10 ml/Chromium/

Copper/Manganese/ Seleni/Zn 1 ml/ Insulin Human Regular 15 unit/ Sodium Chloride

20 meq/Potassium Chloride 30 meq/ Total Parenteral Nutrition/Amino 

Acids/Dextrose/ Fat Emulsion Intravenous 1,920 ml @  80 mls/hr TPN  CONT IV  

Last administered on 6/23/20at 21:46;  Start 6/23/20 at 22:00;  Stop 6/24/20 at 

21:59;  Status DC


Sodium Chloride 80 meq/Potassium Chloride 30 meq/ Potassium Acetate 30 

meq/Magnesium Sulfate 14 meq/ Multivitamins 10 ml/Chromium/ Copper/Manganese/ 

Seleni/Zn 1 ml/ Insulin Human Regular 15 unit/ Total Parenteral Nutrition/Amino 

Acids/Dextrose/ Fat Emulsion Intravenous 1,920 ml @  80 mls/hr TPN  CONT IV  

Last administered on 6/24/20at 22:33;  Start 6/24/20 at 22:00;  Stop 6/25/20 at 

21:59;  Status DC


Furosemide (Lasix) 40 mg 1X  ONCE IVP  Last administered on 6/24/20at 16:27;  

Start 6/24/20 at 15:30;  Stop 6/24/20 at 15:33;  Status DC


Albumin Human 250 ml @  62.5 mls/hr 1X  ONCE IV  Last administered on 6/24/20at 

16:27;  Start 6/24/20 at 15:30;  Stop 6/24/20 at 19:29;  Status DC


Sodium Chloride 80 meq/Potassium Chloride 30 meq/ Potassium Acetate 30 

meq/Magnesium Sulfate 14 meq/ Multivitamins 10 ml/Chromium/ Copper/Manganese/ 

Seleni/Zn 1 ml/ Insulin Human Regular 15 unit/ Total Parenteral Nutrition/Amino 

Acids/Dextrose/ Fat Emulsion Intravenous 1,920 ml @  80 mls/hr TPN  CONT IV  

Last administered on 6/25/20at 22:25;  Start 6/25/20 at 22:00;  Stop 6/26/20 at 

21:59;  Status DC


Sodium Chloride 80 meq/Potassium Chloride 30 meq/ Potassium Acetate 30 

meq/Magnesium Sulfate 14 meq/ Multivitamins 10 ml/Chromium/ Copper/Manganese/ 

Seleni/Zn 1 ml/ Insulin Human Regular 15 unit/ Total Parenteral Nutrition/Amino 

Acids/Dextrose/ Fat Emulsion Intravenous 1,920 ml @  80 mls/hr TPN  CONT IV  

Last administered on 6/26/20at 21:32;  Start 6/26/20 at 22:00;  Stop 6/27/20 at 

21:59;  Status DC


Sodium Chloride 80 meq/Potassium Chloride 30 meq/ Potassium Acetate 30 

meq/Magnesium Sulfate 14 meq/ Multivitamins 10 ml/Chromium/ Copper/Manganese/ 

Seleni/Zn 1 ml/ Insulin Human Regular 15 unit/ Total Parenteral Nutrition/Amino 

Acids/Dextrose/ Fat Emulsion Intravenous 1,920 ml @  80 mls/hr TPN  CONT IV  

Last administered on 6/27/20at 21:53;  Start 6/27/20 at 22:00;  Stop 6/28/20 at 

21:59;  Status DC


Acetylcysteine (Mucomyst 20% Resp Treatment) 600 mg RTBID NEB  Last administered

on 7/14/20at 08:00;  Start 6/27/20 at 12:00


Sodium Chloride 80 meq/Potassium Chloride 30 meq/ Potassium Acetate 30 

meq/Magnesium Sulfate 14 meq/ Multivitamins 10 ml/Chromium/ Copper/Manganese/ 

Seleni/Zn 1 ml/ Insulin Human Regular 15 unit/ Total Parenteral Nutrition/Amino 

Acids/Dextrose/ Fat Emulsion Intravenous 1,920 ml @  80 mls/hr TPN  CONT IV  

Last administered on 6/28/20at 22:06;  Start 6/28/20 at 22:00;  Stop 6/29/20 at 

21:59;  Status DC


Meropenem 500 mg/ Sodium Chloride 50 ml @  100 mls/hr Q6HRS IV  Last 

administered on 7/14/20at 12:39;  Start 6/28/20 at 18:00


Daptomycin 500 mg/ Sodium Chloride 50 ml @  100 mls/hr Q24H IV  Last 

administered on 7/6/20at 21:47;  Start 6/28/20 at 19:00;  Stop 7/7/20 at 08:13; 

Status DC


Sodium Chloride 80 meq/Potassium Chloride 30 meq/ Potassium Acetate 30 

meq/Magnesium Sulfate 14 meq/ Multivitamins 10 ml/Chromium/ Copper/Manganese/ 

Seleni/Zn 1 ml/ Insulin Human Regular 15 unit/ Total Parenteral Nutrition/Amino 

Acids/Dextrose/ Fat Emulsion Intravenous 1,920 ml @  80 mls/hr TPN  CONT IV  

Last administered on 6/29/20at 22:09;  Start 6/29/20 at 22:00;  Stop 6/30/20 at 

21:59;  Status DC


Heparin Sodium (Porcine) 1000 unit/Sodium Chloride 1,001 ml @  1,001 mls/hr 1X  

ONCE IRR ;  Start 6/30/20 at 06:00;  Stop 6/30/20 at 06:59;  Status DC


Propofol (Diprivan) 200 mg STK-MED ONCE IV ;  Start 6/30/20 at 07:44;  Stop 

6/30/20 at 07:44;  Status DC


Lidocaine HCl (Lidocaine Pf 2% Vial) 5 ml STK-MED ONCE .ROUTE ;  Start 6/30/20 

at 07:44;  Stop 6/30/20 at 07:44;  Status DC


Fentanyl Citrate (Fentanyl 2ml Vial) 100 mcg STK-MED ONCE .ROUTE ;  Start 

6/30/20 at 07:44;  Stop 6/30/20 at 07:44;  Status DC


Rocuronium Bromide (Zemuron) 100 mg STK-MED ONCE .ROUTE ;  Start 6/30/20 at 

07:44;  Stop 6/30/20 at 07:44;  Status DC


Micafungin Sodium 100 mg/Dextrose 100 ml @  100 mls/hr Q24H IV  Last 

administered on 7/14/20at 09:00;  Start 6/30/20 at 08:30


Bupivacaine HCl/ Epinephrine Bitart (Sensorcain-Epi 0.5%-1:[id number] Mpf) 30 ml 

STK-MED ONCE .ROUTE ;  Start 6/30/20 at 08:34;  Stop 6/30/20 at 08:35;  Status 

DC


Iohexol (Omnipaque 300 Mg/ml) 50 ml STK-MED ONCE .ROUTE  Last administered on 

6/30/20at 13:30;  Start 6/30/20 at 08:35;  Stop 6/30/20 at 08:35;  Status DC


Sodium Chloride 80 meq/Potassium Chloride 30 meq/ Potassium Acetate 30 

meq/Magnesium Sulfate 14 meq/ Multivitamins 10 ml/Chromium/ Copper/Manganese/ 

Seleni/Zn 1 ml/ Insulin Human Regular 15 unit/ Total Parenteral Nutrition/Amino 

Acids/Dextrose/ Fat Emulsion Intravenous 1,920 ml @  80 mls/hr TPN  CONT IV  

Last administered on 7/1/20at 01:22;  Start 6/30/20 at 22:00;  Stop 7/1/20 at 

21:59;  Status DC


Phenylephrine HCl (Ken-Synephrine Inj) 10 mg STK-MED ONCE .ROUTE ;  Start 

6/30/20 at 10:15;  Stop 6/30/20 at 10:15;  Status DC


Desflurane (Suprane) 90 ml STK-MED ONCE IH ;  Start 6/30/20 at 10:18;  Stop 

6/30/20 at 10:19;  Status DC


Albumin Human 500 ml @  As Directed STK-MED ONCE IV ;  Start 6/30/20 at 11:06;  

Stop 6/30/20 at 11:06;  Status DC


Vasopressin (Vasostrict) 20 unit STK-MED ONCE .ROUTE ;  Start 6/30/20 at 12:23; 

Stop 6/30/20 at 12:23;  Status DC


Phenylephrine HCl (Ekn-Synephrine Inj) 10 mg STK-MED ONCE .ROUTE ;  Start 

6/30/20 at 13:33;  Stop 6/30/20 at 13:33;  Status DC


Phenylephrine HCl (Ken-Synephrine Inj) 10 mg STK-MED ONCE .ROUTE ;  Start 

6/30/20 at 13:33;  Stop 6/30/20 at 13:33;  Status DC


Ondansetron HCl (Zofran) 4 mg STK-MED ONCE .ROUTE ;  Start 6/30/20 at 13:33;  

Stop 6/30/20 at 13:33;  Status DC


Enoxaparin Sodium (Lovenox 40mg Syringe) 40 mg Q24H SQ  Last administered on 

7/14/20at 09:01;  Start 7/1/20 at 08:00


Sodium Chloride (Normal Saline Flush) 3 ml QSHIFT  PRN IV AFTER MEDS AND BLOOD 

DRAWS;  Start 6/30/20 at 14:45


Naloxone HCl (Narcan) 0.4 mg PRN Q2MIN  PRN IV SEE INSTRUCTIONS;  Start 6/30/20 

at 14:45


Sodium Chloride 1,000 ml @  25 mls/hr Q24H IV  Last administered on 7/13/20at 

14:33;  Start 6/30/20 at 14:33


Morphine Sulfate (Morphine Sulfate) 1 mg PRN Q1HR  PRN IV PAIN;  Start 6/30/20 

at 14:45


Midazolam HCl 100 mg/Sodium Chloride 100 ml @ 1 mls/hr CONT  PRN IV SEE I/O 

RECORD Last administered on 7/3/20at 18:48;  Start 6/30/20 at 14:45


Phenylephrine HCl (PHENYLEPHRINE in 0.9% NACL PF) 1 mg STK-MED ONCE IV ;  Start 

6/30/20 at 14:44;  Stop 6/30/20 at 14:45;  Status DC


Ephedrine Sulfate (ePHEDrine PF IN SALINE SYRINGE) 50 mg STK-MED ONCE IV ;  

Start 6/30/20 at 14:45;  Stop 6/30/20 at 14:45;  Status DC


Vasopressin 20 unit/Dextrose 101 ml @  12 mls/hr CONT  PRN IV SEE I/O RECORD 

Last administered on 7/7/20at 04:17;  Start 6/30/20 at 15:30


Sodium Chloride 1,000 ml @  1,000 mls/hr 1X  ONCE IV  Last administered on 

6/30/20at 15:42;  Start 6/30/20 at 15:45;  Stop 6/30/20 at 16:44;  Status DC


Albumin Human 500 ml @  125 mls/hr 1X  ONCE IV ;  Start 6/30/20 at 16:00;  Stop 

6/30/20 at 19:59;  Status DC


Albumin Human 500 ml @  125 mls/hr PRN Q1HR  PRN IV PER PROTOCOL;  Start 6/30/20

at 15:45


Magnesium Sulfate 50 ml @ 25 mls/hr 1X  ONCE IV  Last administered on 6/30/20at 

17:02;  Start 6/30/20 at 16:30;  Stop 6/30/20 at 18:29;  Status DC


Sodium Bicarbonate (Sodium Bicarb Adult 8.4% Syr) 50 meq STK-MED ONCE .ROUTE ;  

Start 6/30/20 at 16:20;  Stop 6/30/20 at 16:20;  Status DC


Sodium Bicarbonate (Sodium Bicarb Adult 8.4% Syr) 100 meq 1X  ONCE IV  Last 

administered on 6/30/20at 17:07;  Start 6/30/20 at 16:30;  Stop 6/30/20 at 

16:31;  Status DC


Sodium Bicarbonate 150 meq/Dextrose 1,150 ml @  75 mls/hr 1X  ONCE IV  Last 

administered on 6/30/20at 20:02;  Start 6/30/20 at 16:30;  Stop 7/1/20 at 07:49;

 Status DC


Sodium Chloride 80 meq/Potassium Chloride 30 meq/ Potassium Acetate 30 

meq/Magnesium Sulfate 14 meq/ Multivitamins 10 ml/Chromium/ Copper/Manganese/ 

Seleni/Zn 1 ml/ Insulin Human Regular 15 unit/ Total Parenteral Nutrition/Amino 

Acids/Dextrose/ Fat Emulsion Intravenous 1,920 ml @  80 mls/hr TPN  CONT IV  

Last administered on 7/1/20at 23:05;  Start 7/1/20 at 22:00;  Stop 7/2/20 at 

21:59;  Status DC


Sodium Chloride 100 meq/Potassium Chloride 30 meq/ Potassium Acetate 30 

meq/Magnesium Sulfate 12 meq/ Multivitamins 10 ml/Chromium/ Copper/Manganese/ 

Seleni/Zn 1 ml/ Insulin Human Regular 15 unit/ Total Parenteral Nutrition/Amino 

Acids/Dextrose/ Fat Emulsion Intravenous 1,920 ml @  80 mls/hr TPN  CONT IV  

Last administered on 7/2/20at 21:52;  Start 7/2/20 at 22:00;  Stop 7/3/20 at 

21:59;  Status DC


Sodium Chloride 100 meq/Potassium Chloride 30 meq/ Potassium Acetate 30 

meq/Magnesium Sulfate 12 meq/ Multivitamins 10 ml/Chromium/ Copper/Manganese/ 

Seleni/Zn 1 ml/ Insulin Human Regular 15 unit/ Total Parenteral Nutrition/Amino 

Acids/Dextrose/ Fat Emulsion Intravenous 1,920 ml @  80 mls/hr TPN  CONT IV  

Last administered on 7/3/20at 21:46;  Start 7/3/20 at 22:00;  Stop 7/4/20 at 2

1:59;  Status DC


Sodium Chloride 100 meq/Potassium Chloride 30 meq/ Potassium Acetate 30 

meq/Magnesium Sulfate 12 meq/ Multivitamins 10 ml/Chromium/ Copper/Manganese/ 

Seleni/Zn 1 ml/ Insulin Human Regular 15 unit/ Total Parenteral Nutrition/Amino 

Acids/Dextrose/ Fat Emulsion Intravenous 1,800 ml @  75 mls/hr TPN  CONT IV  

Last administered on 7/4/20at 22:04;  Start 7/4/20 at 22:00;  Stop 7/5/20 at 

21:59;  Status DC


Fentanyl Citrate 55 ml @ 0 mls/hr CONT  PRN IV SEE COMMENTS Last administered on

7/6/20at 23:55;  Start 7/4/20 at 13:00;  Stop 7/9/20 at 17:28;  Status DC


Sodium Chloride 100 meq/Potassium Chloride 30 meq/ Potassium Acetate 30 

meq/Magnesium Sulfate 12 meq/ Multivitamins 10 ml/Chromium/ Copper/Manganese/ 

Seleni/Zn 1 ml/ Insulin Human Regular 15 unit/ Total Parenteral Nutrition/Amino 

Acids/Dextrose/ Fat Emulsion Intravenous 1,680 ml @  70 mls/hr TPN  CONT IV  

Last administered on 7/5/20at 21:23;  Start 7/5/20 at 22:00;  Stop 7/6/20 at 

21:59;  Status DC


Sodium Chloride 110 meq/Potassium Chloride 30 meq/ Potassium Acetate 30 

meq/Magnesium Sulfate 15 meq/ Multivitamins 10 ml/Chromium/ Copper/Manganese/ 

Seleni/Zn 1 ml/ Insulin Human Regular 15 unit/ Total Parenteral Nutrition/Amino 

Acids/Dextrose/ Fat Emulsion Intravenous 1,680 ml @  70 mls/hr TPN  CONT IV  

Last administered on 7/6/20at 21:48;  Start 7/6/20 at 22:00;  Stop 7/7/20 at 

21:59;  Status DC


Sodium Chloride 110 meq/Potassium Chloride 30 meq/ Potassium Acetate 30 

meq/Magnesium Sulfate 15 meq/ Multivitamins 10 ml/Chromium/ Copper/Manganese/ 

Seleni/Zn 1 ml/ Insulin Human Regular 15 unit/ Total Parenteral Nutrition/Amino 

Acids/Dextrose/ Fat Emulsion Intravenous 1,680 ml @  70 mls/hr TPN  CONT IV  

Last administered on 7/7/20at 21:33;  Start 7/7/20 at 22:00;  Stop 7/8/20 at 

21:59;  Status DC


Sodium Chloride 110 meq/Potassium Chloride 30 meq/ Potassium Acetate 30 

meq/Magnesium Sulfate 15 meq/ Multivitamins 10 ml/Chromium/ Copper/Manganese/ 

Seleni/Zn 1 ml/ Insulin Human Regular 15 unit/ Total Parenteral Nutrition/Amino 

Acids/Dextrose/ Fat Emulsion Intravenous 1,680 ml @  70 mls/hr TPN  CONT IV  

Last administered on 7/8/20at 21:51;  Start 7/8/20 at 22:00;  Stop 7/9/20 at 

21:59;  Status DC


Sodium Chloride 90 meq/Potassium Chloride 30 meq/ Potassium Acetate 30 

meq/Magnesium Sulfate 15 meq/ Multivitamins 10 ml/Chromium/ Copper/Manganese/ 

Seleni/Zn 1 ml/ Insulin Human Regular 15 unit/ Total Parenteral Nutrition/Amino 

Acids/Dextrose/ Fat Emulsion Intravenous 1,680 ml @  70 mls/hr TPN  CONT IV  

Last administered on 7/9/20at 22:38;  Start 7/9/20 at 22:00;  Stop 7/10/20 at 

21:59;  Status DC


Fentanyl Citrate 30 ml @ 0 mls/hr CONT  PRN IV SEE I/O RECORD;  Start 7/9/20 at 

17:30


Fentanyl (Duragesic 12mcg/ Hr Patch) 1 patch Q3DAYS TD  Last administered on 

7/13/20at 10:01;  Start 7/10/20 at 09:00


Sodium Chloride 90 meq/Potassium Chloride 30 meq/ Potassium Acetate 30 

meq/Magnesium Sulfate 15 meq/ Multivitamins 10 ml/Chromium/ Copper/Manganese/ 

Seleni/Zn 1 ml/ Insulin Human Regular 15 unit/ Total Parenteral Nutrition/Amino 

Acids/Dextrose/ Fat Emulsion Intravenous 1,680 ml @  70 mls/hr TPN  CONT IV  

Last administered on 7/10/20at 21:59;  Start 7/10/20 at 22:00;  Stop 7/11/20 at 

21:59;  Status DC


Sodium Chloride 90 meq/Potassium Chloride 30 meq/ Potassium Acetate 30 

meq/Magnesium Sulfate 15 meq/ Multivitamins 10 ml/Chromium/ Copper/Manganese/ 

Seleni/Zn 1 ml/ Insulin Human Regular 15 unit/ Total Parenteral Nutrition/Amino 

Acids/Dextrose/ Fat Emulsion Intravenous 1,680 ml @  70 mls/hr TPN  CONT IV  

Last administered on 7/11/20at 21:35;  Start 7/11/20 at 22:00;  Stop 7/12/20 at 

21:59;  Status DC


Vancomycin HCl (Vanco Per Pharmacy) 1 each PRN DAILY  PRN MC SEE COMMENTS Last 

administered on 7/14/20at 02:46;  Start 7/12/20 at 09:15


Ciprofloxacin/ Dextrose 200 ml @  200 mls/hr Q12HR IV  Last administered on 

7/14/20at 09:00;  Start 7/12/20 at 10:00


Vancomycin HCl 2 gm/Sodium Chloride 500 ml @  250 mls/hr 1X  ONCE IV  Last 

administered on 7/12/20at 10:34;  Start 7/12/20 at 10:00;  Stop 7/12/20 at 

11:59;  Status DC


Sodium Chloride 90 meq/Potassium Chloride 30 meq/ Potassium Acetate 30 meq/Magn

esium Sulfate 15 meq/ Multivitamins 10 ml/Chromium/ Copper/Manganese/ Seleni/Zn 

1 ml/ Insulin Human Regular 15 unit/ Total Parenteral Nutrition/Amino 

Acids/Dextrose/ Fat Emulsion Intravenous 1,680 ml @  70 mls/hr TPN  CONT IV  

Last administered on 7/12/20at 22:02;  Start 7/12/20 at 22:00;  Stop 7/13/20 at 

21:59;  Status DC


Diphenhydramine HCl (Benadryl) 25 mg 1X  ONCE IVP  Last administered on 

7/12/20at 14:26;  Start 7/12/20 at 14:30;  Stop 7/12/20 at 14:31;  Status DC


Vancomycin HCl 1.5 gm/Sodium Chloride 500 ml @  250 mls/hr Q8H IV  Last 

administered on 7/13/20at 03:08;  Start 7/12/20 at 18:30;  Stop 7/13/20 at 

12:24;  Status DC


Vancomycin HCl (Vancomycin Trough Level) 1 each 1X  ONCE MC  Last administered 

on 7/13/20at 10:00;  Start 7/13/20 at 10:00;  Stop 7/13/20 at 10:01;  Status DC


Sodium Chloride 90 meq/Potassium Chloride 30 meq/ Potassium Acetate 30 

meq/Magnesium Sulfate 15 meq/ Multivitamins 10 ml/Chromium/ Copper/Manganese/ 

Seleni/Zn 1 ml/ Insulin Human Regular 15 unit/ Total Parenteral Nutrition/Amino 

Acids/Dextrose/ Fat Emulsion Intravenous 1,680 ml @  70 mls/hr TPN  CONT IV  

Last administered on 7/13/20at 22:13;  Start 7/13/20 at 22:00;  Stop 7/14/20 at 

21:59


Vancomycin HCl (Vancomycin Random Level) 1 each 1X  ONCE MC  Last administered 

on 7/14/20at 01:00;  Start 7/14/20 at 01:00;  Stop 7/14/20 at 01:01;  Status DC


Vancomycin HCl 1.5 gm/Sodium Chloride 500 ml @  250 mls/hr Q12H IV  Last 

administered on 7/14/20at 09:00;  Start 7/14/20 at 10:00


Vancomycin HCl (Vancomycin Trough Level) 1 each 1X  ONCE MC ;  Start 7/15/20 at 

09:30;  Stop 7/15/20 at 09:31


Sodium Chloride 90 meq/Potassium Chloride 30 meq/ Potassium Acetate 30 meq/

Magnesium Sulfate 15 meq/ Multivitamins 10 ml/Chromium/ Copper/Manganese/ 

Seleni/Zn 1 ml/ Insulin Human Regular 15 unit/ Total Parenteral Nutrition/Amino 

Acids/Dextrose/ Fat Emulsion Intravenous 1,680 ml @  70 mls/hr TPN  CONT IV ;  

Start 7/14/20 at 22:00;  Stop 7/15/20 at 21:59


Alteplase, Recombinant (Cathflo For Central Catheter Clearance) 1 mg 1X  ONCE 

INT CAT  Last administered on 7/14/20at 11:49;  Start 7/14/20 at 11:00;  Stop 

7/14/20 at 11:01;  Status DC





Active Scripts


Active


Reported


Bisoprolol Fumarate 5 Mg Tablet 10 Mg PO DAILY


Vitals/I & O





Vital Sign - Last 24 Hours








 7/13/20 7/13/20 7/13/20 7/13/20





 15:00 16:00 16:00 16:02


 


Temp   98.9 





   98.9 


 


Pulse 120  118 


 


Resp 32  32 


 


B/P (MAP) 142/97 (112)  140/95 (110) 


 


Pulse Ox 100  100 100


 


O2 Delivery Tracheal Collar Trach Collar Tracheal Collar Tracheal Collar


 


O2 Flow Rate  9.0  8.0


 


    





    





 7/13/20 7/13/20 7/13/20 7/13/20





 17:00 18:00 19:00 19:57


 


Temp  100.0  





  100.0  


 


Pulse 120 120 133 


 


Resp 30 28 39 


 


B/P (MAP) 140/75 (96) 149/84 (105) 154/96 (115) 


 


Pulse Ox 100 100 99 97


 


O2 Delivery Tracheal Collar Tracheal Collar Tracheal Collar Ventilator


 


    





    





 7/13/20 7/13/20 7/13/20 7/13/20





 20:00 20:00 21:00 22:00


 


Temp  101.5  





  101.5  


 


Pulse  137 129 120


 


Resp  32 19 18


 


B/P (MAP)  140/86 (104) 113/66 (82) 108/69 (82)


 


Pulse Ox  100 100 100


 


O2 Delivery Mechanical Ventilator Ventilator Ventilator Ventilator


 


    





    





 7/13/20 7/13/20 7/14/20 7/14/20





 22:49 23:00 00:00 00:00


 


Temp    99.1





    99.1


 


Pulse  113  98


 


Resp  18  12


 


B/P (MAP)  110/60 (77)  114/71 (85)


 


Pulse Ox 100 100  100


 


O2 Delivery Ventilator Ventilator Mechanical Ventilator Ventilator


 


    





    





 7/14/20 7/14/20 7/14/20 7/14/20





 01:00 02:00 03:00 07:00


 


Pulse 102 106 110 122


 


Resp 12 17 17 22


 


B/P (MAP) 105/64 (78) 129/77 (94) 123/82 (96) 131/71 (91)


 


Pulse Ox 100 100 100 99


 


O2 Delivery Ventilator Ventilator Ventilator Tracheal Collar


 


O2 Flow Rate    8.0





 7/14/20 7/14/20 7/14/20 7/14/20





 08:00 08:00 09:00 09:02


 


Temp 99.2   





 99.2   


 


Pulse 129  130 


 


Resp 27  40 40


 


B/P (MAP) 125/75 (92)  113/61 (78) 


 


Pulse Ox 100  98 


 


O2 Delivery Tracheal Collar Mechanical Ventilator Tracheal Collar Tracheal 

Collar


 


O2 Flow Rate 8.0  8.0 8.0


 


    





    





 7/14/20 7/14/20 7/14/20 7/14/20





 09:52 10:00 11:00 12:00


 


Temp    98.9





    98.9


 


Pulse  130 124 96


 


Resp 27 35 36 41


 


B/P (MAP)  119/70 (86) 133/77 (95) 131/85 (100)


 


Pulse Ox 100 99 100 96


 


O2 Delivery Tracheal Collar Tracheal Collar Tracheal Collar Tracheal Collar


 


O2 Flow Rate 8.0 8.0 8.0 8.0


 


    





    





 7/14/20 7/14/20 7/14/20 7/14/20





 12:00 12:44 13:00 13:08


 


Pulse   133 


 


Resp   34 37


 


B/P (MAP)   131/89 (103) 


 


Pulse Ox  100 96 100


 


O2 Delivery Trach Collar Tracheal Collar Tracheal Collar 


 


O2 Flow Rate 8.0 8.0 8.0 





 7/14/20   





 13:50   


 


Resp 35   


 


Pulse Ox 99   


 


O2 Delivery Tracheal Collar   


 


O2 Flow Rate 8.0   














Intake and Output   


 


 7/13/20 7/13/20 7/14/20





 15:00 23:00 07:00


 


Intake Total 400 ml 1016 ml 637 ml


 


Output Total 735 ml 1207 ml 390 ml


 


Balance -335 ml -191 ml 247 ml











Justicifation of Admission Dx:


Justifications for Admission:


Justification of Admission Dx:  Yes











CHEY TURNER MD              Jul 14, 2020 14:40

## 2020-07-15 VITALS — DIASTOLIC BLOOD PRESSURE: 92 MMHG | SYSTOLIC BLOOD PRESSURE: 103 MMHG

## 2020-07-15 VITALS — SYSTOLIC BLOOD PRESSURE: 132 MMHG | DIASTOLIC BLOOD PRESSURE: 85 MMHG

## 2020-07-15 VITALS — SYSTOLIC BLOOD PRESSURE: 133 MMHG | DIASTOLIC BLOOD PRESSURE: 73 MMHG

## 2020-07-15 VITALS — DIASTOLIC BLOOD PRESSURE: 80 MMHG | SYSTOLIC BLOOD PRESSURE: 145 MMHG

## 2020-07-15 VITALS — SYSTOLIC BLOOD PRESSURE: 131 MMHG | DIASTOLIC BLOOD PRESSURE: 83 MMHG

## 2020-07-15 VITALS — DIASTOLIC BLOOD PRESSURE: 95 MMHG | SYSTOLIC BLOOD PRESSURE: 160 MMHG

## 2020-07-15 VITALS — DIASTOLIC BLOOD PRESSURE: 84 MMHG | SYSTOLIC BLOOD PRESSURE: 141 MMHG

## 2020-07-15 VITALS — SYSTOLIC BLOOD PRESSURE: 146 MMHG | DIASTOLIC BLOOD PRESSURE: 86 MMHG

## 2020-07-15 VITALS — DIASTOLIC BLOOD PRESSURE: 83 MMHG | SYSTOLIC BLOOD PRESSURE: 139 MMHG

## 2020-07-15 VITALS — SYSTOLIC BLOOD PRESSURE: 151 MMHG | DIASTOLIC BLOOD PRESSURE: 71 MMHG

## 2020-07-15 VITALS — SYSTOLIC BLOOD PRESSURE: 137 MMHG | DIASTOLIC BLOOD PRESSURE: 85 MMHG

## 2020-07-15 VITALS — SYSTOLIC BLOOD PRESSURE: 130 MMHG | DIASTOLIC BLOOD PRESSURE: 80 MMHG

## 2020-07-15 VITALS — DIASTOLIC BLOOD PRESSURE: 87 MMHG | SYSTOLIC BLOOD PRESSURE: 128 MMHG

## 2020-07-15 VITALS — DIASTOLIC BLOOD PRESSURE: 91 MMHG | SYSTOLIC BLOOD PRESSURE: 158 MMHG

## 2020-07-15 VITALS — DIASTOLIC BLOOD PRESSURE: 74 MMHG | SYSTOLIC BLOOD PRESSURE: 122 MMHG

## 2020-07-15 VITALS — SYSTOLIC BLOOD PRESSURE: 169 MMHG | DIASTOLIC BLOOD PRESSURE: 84 MMHG

## 2020-07-15 VITALS — SYSTOLIC BLOOD PRESSURE: 162 MMHG | DIASTOLIC BLOOD PRESSURE: 85 MMHG

## 2020-07-15 VITALS — SYSTOLIC BLOOD PRESSURE: 141 MMHG | DIASTOLIC BLOOD PRESSURE: 81 MMHG

## 2020-07-15 VITALS — DIASTOLIC BLOOD PRESSURE: 91 MMHG | SYSTOLIC BLOOD PRESSURE: 155 MMHG

## 2020-07-15 VITALS — DIASTOLIC BLOOD PRESSURE: 73 MMHG | SYSTOLIC BLOOD PRESSURE: 133 MMHG

## 2020-07-15 VITALS — DIASTOLIC BLOOD PRESSURE: 81 MMHG | SYSTOLIC BLOOD PRESSURE: 151 MMHG

## 2020-07-15 LAB
ANION GAP SERPL CALC-SCNC: 3 MMOL/L (ref 6–14)
BASOPHILS # BLD AUTO: 0 X10^3/UL (ref 0–0.2)
BASOPHILS NFR BLD: 0 % (ref 0–3)
BUN SERPL-MCNC: 9 MG/DL (ref 7–20)
CALCIUM SERPL-MCNC: 8.7 MG/DL (ref 8.5–10.1)
CHLORIDE SERPL-SCNC: 102 MMOL/L (ref 98–107)
CO2 SERPL-SCNC: 30 MMOL/L (ref 21–32)
CREAT SERPL-MCNC: 0.5 MG/DL (ref 0.6–1)
EOSINOPHIL NFR BLD: 0.3 X10^3/UL (ref 0–0.7)
EOSINOPHIL NFR BLD: 3 % (ref 0–3)
ERYTHROCYTE [DISTWIDTH] IN BLOOD BY AUTOMATED COUNT: 14.8 % (ref 11.5–14.5)
GFR SERPLBLD BASED ON 1.73 SQ M-ARVRAT: 131.1 ML/MIN
GLUCOSE SERPL-MCNC: 126 MG/DL (ref 70–99)
HCT VFR BLD CALC: 21.2 % (ref 36–47)
HGB BLD-MCNC: 7 G/DL (ref 12–15.5)
LYMPHOCYTES # BLD: 1 X10^3/UL (ref 1–4.8)
LYMPHOCYTES NFR BLD AUTO: 10 % (ref 24–48)
MCH RBC QN AUTO: 29 PG (ref 25–35)
MCHC RBC AUTO-ENTMCNC: 33 G/DL (ref 31–37)
MCV RBC AUTO: 87 FL (ref 79–100)
MONO #: 1 X10^3/UL (ref 0–1.1)
MONOCYTES NFR BLD: 10 % (ref 0–9)
NEUT #: 7.8 X10^3/UL (ref 1.8–7.7)
NEUTROPHILS NFR BLD AUTO: 77 % (ref 31–73)
PLATELET # BLD AUTO: 551 X10^3/UL (ref 140–400)
POTASSIUM SERPL-SCNC: 4.1 MMOL/L (ref 3.5–5.1)
RBC # BLD AUTO: 2.44 X10^6/UL (ref 3.5–5.4)
SODIUM SERPL-SCNC: 135 MMOL/L (ref 136–145)
WBC # BLD AUTO: 10.1 X10^3/UL (ref 4–11)

## 2020-07-15 RX ADMIN — IPRATROPIUM BROMIDE AND ALBUTEROL SULFATE SCH ML: .5; 3 SOLUTION RESPIRATORY (INHALATION) at 03:38

## 2020-07-15 RX ADMIN — MEROPENEM SCH MLS/HR: 500 INJECTION, POWDER, FOR SOLUTION INTRAVENOUS at 00:08

## 2020-07-15 RX ADMIN — DAPTOMYCIN SCH MLS/HR: 500 INJECTION, POWDER, LYOPHILIZED, FOR SOLUTION INTRAVENOUS at 08:59

## 2020-07-15 RX ADMIN — ACETYLCYSTEINE SCH MG: 200 INHALANT RESPIRATORY (INHALATION) at 20:08

## 2020-07-15 RX ADMIN — IPRATROPIUM BROMIDE AND ALBUTEROL SULFATE SCH ML: .5; 3 SOLUTION RESPIRATORY (INHALATION) at 16:08

## 2020-07-15 RX ADMIN — IPRATROPIUM BROMIDE AND ALBUTEROL SULFATE SCH ML: .5; 3 SOLUTION RESPIRATORY (INHALATION) at 03:39

## 2020-07-15 RX ADMIN — INSULIN LISPRO SCH UNITS: 100 INJECTION, SOLUTION INTRAVENOUS; SUBCUTANEOUS at 00:00

## 2020-07-15 RX ADMIN — BACITRACIN SCH MLS/HR: 5000 INJECTION, POWDER, FOR SOLUTION INTRAMUSCULAR at 14:23

## 2020-07-15 RX ADMIN — INSULIN LISPRO SCH UNITS: 100 INJECTION, SOLUTION INTRAVENOUS; SUBCUTANEOUS at 18:00

## 2020-07-15 RX ADMIN — CIPROFLOXACIN SCH MLS/HR: 2 INJECTION, SOLUTION INTRAVENOUS at 20:44

## 2020-07-15 RX ADMIN — FENTANYL CITRATE PRN MCG: 50 INJECTION INTRAMUSCULAR; INTRAVENOUS at 19:53

## 2020-07-15 RX ADMIN — CIPROFLOXACIN SCH MLS/HR: 2 INJECTION, SOLUTION INTRAVENOUS at 08:58

## 2020-07-15 RX ADMIN — INSULIN LISPRO SCH UNITS: 100 INJECTION, SOLUTION INTRAVENOUS; SUBCUTANEOUS at 12:00

## 2020-07-15 RX ADMIN — IPRATROPIUM BROMIDE AND ALBUTEROL SULFATE SCH ML: .5; 3 SOLUTION RESPIRATORY (INHALATION) at 11:55

## 2020-07-15 RX ADMIN — MEROPENEM SCH MLS/HR: 500 INJECTION, POWDER, FOR SOLUTION INTRAVENOUS at 17:09

## 2020-07-15 RX ADMIN — MEROPENEM SCH MLS/HR: 500 INJECTION, POWDER, FOR SOLUTION INTRAVENOUS at 05:56

## 2020-07-15 RX ADMIN — INSULIN LISPRO SCH UNITS: 100 INJECTION, SOLUTION INTRAVENOUS; SUBCUTANEOUS at 05:53

## 2020-07-15 RX ADMIN — ACETAMINOPHEN PRN MG: 650 SUPPOSITORY RECTAL at 19:52

## 2020-07-15 RX ADMIN — MEROPENEM SCH MLS/HR: 500 INJECTION, POWDER, FOR SOLUTION INTRAVENOUS at 14:21

## 2020-07-15 RX ADMIN — IPRATROPIUM BROMIDE AND ALBUTEROL SULFATE SCH ML: .5; 3 SOLUTION RESPIRATORY (INHALATION) at 07:17

## 2020-07-15 RX ADMIN — DEXTROSE SCH MLS/HR: 50 INJECTION, SOLUTION INTRAVENOUS at 08:15

## 2020-07-15 RX ADMIN — PANTOPRAZOLE SODIUM SCH MG: 40 INJECTION, POWDER, FOR SOLUTION INTRAVENOUS at 07:45

## 2020-07-15 RX ADMIN — IPRATROPIUM BROMIDE AND ALBUTEROL SULFATE SCH ML: .5; 3 SOLUTION RESPIRATORY (INHALATION) at 20:08

## 2020-07-15 RX ADMIN — ACETYLCYSTEINE SCH MG: 200 INHALANT RESPIRATORY (INHALATION) at 07:18

## 2020-07-15 RX ADMIN — ENOXAPARIN SODIUM SCH MG: 40 INJECTION SUBCUTANEOUS at 14:22

## 2020-07-15 RX ADMIN — ONDANSETRON PRN MG: 2 INJECTION INTRAMUSCULAR; INTRAVENOUS at 07:46

## 2020-07-15 RX ADMIN — ACETAMINOPHEN PRN MG: 650 SUPPOSITORY RECTAL at 08:14

## 2020-07-15 NOTE — PDOC
PROGRESS NOTES


Chief Complaint


Chief Complaint


A/P


Acute hypoxic Respiratory failure required  mechanical ventilation


Tracheostomy


bilateral pleural effusions/pulm edema s/p Throacentesis on 6/15/2020


Severe Acute gallstone pancreatitis (not a surgical candidate at this time) with

necrosis


Acute kidney failure now requiring dialysis


Gallstones (Calculus of gallbladder with acute cholecystitis without 

obstruction)


HTN 


Intractable pain


Intractable nausea


Covid 19 negative. 


Acute on chronic anemia 


EEG: No seizure activity


Fever  - intermittent 


? Ileus with vomiting


Abd distention - U/S and CT reviewed s/p 0.4 L of opaque, debris-containing 

ascites was removed 5/6


Acute pancreatitis with persistent necrosis


Gallstone pancreatitis with necrosis. 


   -CT A/P 6/6 showed multiple pseudocysts, slight larger on the right. s/p 

drains x 3, 6/7.  + PSAE (MDRO-R Cefepime, Zosyn ANSON < 64) and yeast, 


   -s/p drain 4/27. C. parapsilosis. s/p drain 5/6 + yeast & high amylase; s/p 

additional drain on 5/8. Drains removed. 


Ascites s/p paracentesis 4/15 & 5/6. C. parapsilosis 


JED. off HD. 


A large fluid collection in the pancreatic bed has slightly decreased in size, 

described below, the pancreas itself is difficult to  visualize, which could be 

due to necrosis or obscuration of pancreatic  parenchyma from the surrounding 

fluid collection.6/15 


- 4/27 status post ROBERT drain placement + C paropsilosis. s/p additional drains 

5/8


Anemia - S/p PRBCs. 


Cholelithiasis with thickening of the gallbladder wall.


Leucocytosis improving


JED, hyperkalemia, Metabolic acidosis off dialysis


hypocalcemia 


Prediabetes


HTN


s/p trach


ESRD on HD


Hyperglycemia


severe protein-caloric malnutrition


Moderate to large left pleural effusion with atelectasis and collapse  of most 

of the left lower lobe, stable


 


Dispo - ICU, critically ill


Chest tube drained a bit after Cathflo


TPN per nutrition 


check blood cultures and fluid cultures given fever 


meropenam, cipro, micafungin per ID 


Continue trach shield


ID, gen sx, GI, pulm following 


DVT GI prophylaxis


Continue TPN for nutrition support 


critically ill 


poor prognosis


labs AM. watch Hb. 7.0 this AM





History of Present Illness


History of Present Illness


fever overnight. blood cultures drawn. no complaints.





Vitals


Vitals





Vital Signs








  Date Time  Temp Pulse Resp B/P (MAP) Pulse Ox O2 Delivery O2 Flow Rate FiO2


 


7/15/20 12:00      Trach Collar 8.0 


 


7/15/20 11:55     100   


 


7/15/20 06:00  125 31 141/81 (101)    


 


7/15/20 04:00 100.8       





 100.8       











Physical Exam


Physical Exam


GENERAL: Propped up in bed, awake, weak appearing 


HEENT: Pupils equal, oral cavity dry. NGT out, on vent


NECK:  Tracheostomy 


LUNGS: Diminished aeration bases,  CT on left 


HEART:  S1, S2, regular, tachy 


ABDOMEN: Sightly less distended, bowel sounds hypoactive, soft, richardson x 2, 3 ROBERT

drains, G-J tube and + wound vac 


: [place] in place 


EXTREMITIES: Generalized edema, no cyanosis. SCDs & Podus boots bilaterally, 


SKIN: warm touch. No signs of rash.  


NEURO: awake, mouthing some words, tracking 


LUE-PICC without signs of complications 


LUE art-line out, mottling about old art-line site is improving. RP palpable, 

cap refill brisk.


General:  Cooperative, No acute distress


Heart:  Regular rate (SR/ST), Other (distant heart sounds)


Lungs:  Crackles


Abdomen:  Soft, Other (mulitple drains in place)


Extremities:  Other (Diffuse edema)


Skin:  No rashes, No significant lesion





Labs


LABS





Laboratory Tests








Test


 7/14/20


17:49 7/15/20


00:06 7/15/20


05:52 7/15/20


06:00


 


Glucose (Fingerstick)


 122 mg/dL


(70-99) 119 mg/dL


(70-99) 116 mg/dL


(70-99) 





 


White Blood Count


 


 


 


 10.1 x10^3/uL


(4.0-11.0)


 


Red Blood Count


 


 


 


 2.44 x10^6/uL


(3.50-5.40)


 


Hemoglobin


 


 


 


 7.0 g/dL


(12.0-15.5)


 


Hematocrit


 


 


 


 21.2 %


(36.0-47.0)


 


Mean Corpuscular Volume    87 fL () 


 


Mean Corpuscular Hemoglobin    29 pg (25-35) 


 


Mean Corpuscular Hemoglobin


Concent 


 


 


 33 g/dL


(31-37)


 


Red Cell Distribution Width


 


 


 


 14.8 %


(11.5-14.5)


 


Platelet Count


 


 


 


 551 x10^3/uL


(140-400)


 


Neutrophils (%) (Auto)    77 % (31-73) 


 


Lymphocytes (%) (Auto)    10 % (24-48) 


 


Monocytes (%) (Auto)    10 % (0-9) 


 


Eosinophils (%) (Auto)    3 % (0-3) 


 


Basophils (%) (Auto)    0 % (0-3) 


 


Neutrophils # (Auto)


 


 


 


 7.8 x10^3/uL


(1.8-7.7)


 


Lymphocytes # (Auto)


 


 


 


 1.0 x10^3/uL


(1.0-4.8)


 


Monocytes # (Auto)


 


 


 


 1.0 x10^3/uL


(0.0-1.1)


 


Eosinophils # (Auto)


 


 


 


 0.3 x10^3/uL


(0.0-0.7)


 


Basophils # (Auto)


 


 


 


 0.0 x10^3/uL


(0.0-0.2)


 


Sodium Level


 


 


 


 135 mmol/L


(136-145)


 


Potassium Level


 


 


 


 4.1 mmol/L


(3.5-5.1)


 


Chloride Level


 


 


 


 102 mmol/L


()


 


Carbon Dioxide Level


 


 


 


 30 mmol/L


(21-32)


 


Anion Gap    3 (6-14) 


 


Blood Urea Nitrogen    9 mg/dL (7-20) 


 


Creatinine


 


 


 


 0.5 mg/dL


(0.6-1.0)


 


Estimated GFR


(Cockcroft-Gault) 


 


 


 131.1 





 


Glucose Level


 


 


 


 126 mg/dL


(70-99)


 


Calcium Level


 


 


 


 8.7 mg/dL


(8.5-10.1)











Assessment and Plan


Assessmemt and Plan


Problems


Medical Problems:


(1) Acute pancreatitis


Status: Acute  





(2) Cholelithiasis


Status: Acute  











Comment


Review of Relevant


I have reviewed the following items josy (where applicable) has been applied.


Labs





Laboratory Tests








Test


 7/13/20


18:03 7/14/20


00:50 7/14/20


00:53 7/14/20


05:40


 


Glucose (Fingerstick)


 110 mg/dL


(70-99) 


 120 mg/dL


(70-99) 





 


Random Vancomycin Level  20.6 mcg/mL   


 


Sodium Level


 


 


 


 136 mmol/L


(136-145)


 


Potassium Level


 


 


 


 4.0 mmol/L


(3.5-5.1)


 


Chloride Level


 


 


 


 102 mmol/L


()


 


Carbon Dioxide Level


 


 


 


 28 mmol/L


(21-32)


 


Anion Gap    6 (6-14) 


 


Blood Urea Nitrogen    9 mg/dL (7-20) 


 


Creatinine


 


 


 


 0.5 mg/dL


(0.6-1.0)


 


Estimated GFR


(Cockcroft-Gault) 


 


 


 131.1 





 


Glucose Level


 


 


 


 130 mg/dL


(70-99)


 


Calcium Level


 


 


 


 8.4 mg/dL


(8.5-10.1)


 


Test


 7/14/20


05:50 7/14/20


12:34 7/14/20


17:49 7/15/20


00:06


 


Glucose (Fingerstick)


 123 mg/dL


(70-99) 148 mg/dL


(70-99) 122 mg/dL


(70-99) 119 mg/dL


(70-99)


 


Test


 7/15/20


05:52 7/15/20


06:00 


 





 


Glucose (Fingerstick)


 116 mg/dL


(70-99) 


 


 





 


White Blood Count


 


 10.1 x10^3/uL


(4.0-11.0) 


 





 


Red Blood Count


 


 2.44 x10^6/uL


(3.50-5.40) 


 





 


Hemoglobin


 


 7.0 g/dL


(12.0-15.5) 


 





 


Hematocrit


 


 21.2 %


(36.0-47.0) 


 





 


Mean Corpuscular Volume  87 fL ()   


 


Mean Corpuscular Hemoglobin  29 pg (25-35)   


 


Mean Corpuscular Hemoglobin


Concent 


 33 g/dL


(31-37) 


 





 


Red Cell Distribution Width


 


 14.8 %


(11.5-14.5) 


 





 


Platelet Count


 


 551 x10^3/uL


(140-400) 


 





 


Neutrophils (%) (Auto)  77 % (31-73)   


 


Lymphocytes (%) (Auto)  10 % (24-48)   


 


Monocytes (%) (Auto)  10 % (0-9)   


 


Eosinophils (%) (Auto)  3 % (0-3)   


 


Basophils (%) (Auto)  0 % (0-3)   


 


Neutrophils # (Auto)


 


 7.8 x10^3/uL


(1.8-7.7) 


 





 


Lymphocytes # (Auto)


 


 1.0 x10^3/uL


(1.0-4.8) 


 





 


Monocytes # (Auto)


 


 1.0 x10^3/uL


(0.0-1.1) 


 





 


Eosinophils # (Auto)


 


 0.3 x10^3/uL


(0.0-0.7) 


 





 


Basophils # (Auto)


 


 0.0 x10^3/uL


(0.0-0.2) 


 





 


Sodium Level


 


 135 mmol/L


(136-145) 


 





 


Potassium Level


 


 4.1 mmol/L


(3.5-5.1) 


 





 


Chloride Level


 


 102 mmol/L


() 


 





 


Carbon Dioxide Level


 


 30 mmol/L


(21-32) 


 





 


Anion Gap  3 (6-14)   


 


Blood Urea Nitrogen  9 mg/dL (7-20)   


 


Creatinine


 


 0.5 mg/dL


(0.6-1.0) 


 





 


Estimated GFR


(Cockcroft-Gault) 


 131.1 


 


 





 


Glucose Level


 


 126 mg/dL


(70-99) 


 





 


Calcium Level


 


 8.7 mg/dL


(8.5-10.1) 


 











Laboratory Tests








Test


 7/14/20


17:49 7/15/20


00:06 7/15/20


05:52 7/15/20


06:00


 


Glucose (Fingerstick)


 122 mg/dL


(70-99) 119 mg/dL


(70-99) 116 mg/dL


(70-99) 





 


White Blood Count


 


 


 


 10.1 x10^3/uL


(4.0-11.0)


 


Red Blood Count


 


 


 


 2.44 x10^6/uL


(3.50-5.40)


 


Hemoglobin


 


 


 


 7.0 g/dL


(12.0-15.5)


 


Hematocrit


 


 


 


 21.2 %


(36.0-47.0)


 


Mean Corpuscular Volume    87 fL () 


 


Mean Corpuscular Hemoglobin    29 pg (25-35) 


 


Mean Corpuscular Hemoglobin


Concent 


 


 


 33 g/dL


(31-37)


 


Red Cell Distribution Width


 


 


 


 14.8 %


(11.5-14.5)


 


Platelet Count


 


 


 


 551 x10^3/uL


(140-400)


 


Neutrophils (%) (Auto)    77 % (31-73) 


 


Lymphocytes (%) (Auto)    10 % (24-48) 


 


Monocytes (%) (Auto)    10 % (0-9) 


 


Eosinophils (%) (Auto)    3 % (0-3) 


 


Basophils (%) (Auto)    0 % (0-3) 


 


Neutrophils # (Auto)


 


 


 


 7.8 x10^3/uL


(1.8-7.7)


 


Lymphocytes # (Auto)


 


 


 


 1.0 x10^3/uL


(1.0-4.8)


 


Monocytes # (Auto)


 


 


 


 1.0 x10^3/uL


(0.0-1.1)


 


Eosinophils # (Auto)


 


 


 


 0.3 x10^3/uL


(0.0-0.7)


 


Basophils # (Auto)


 


 


 


 0.0 x10^3/uL


(0.0-0.2)


 


Sodium Level


 


 


 


 135 mmol/L


(136-145)


 


Potassium Level


 


 


 


 4.1 mmol/L


(3.5-5.1)


 


Chloride Level


 


 


 


 102 mmol/L


()


 


Carbon Dioxide Level


 


 


 


 30 mmol/L


(21-32)


 


Anion Gap    3 (6-14) 


 


Blood Urea Nitrogen    9 mg/dL (7-20) 


 


Creatinine


 


 


 


 0.5 mg/dL


(0.6-1.0)


 


Estimated GFR


(Cockcroft-Gault) 


 


 


 131.1 





 


Glucose Level


 


 


 


 126 mg/dL


(70-99)


 


Calcium Level


 


 


 


 8.7 mg/dL


(8.5-10.1)








Microbiology


7/12/20 Gram Stain Evaluation - Final, Complete


          


7/12/20 Respiratory Culture - Final, Complete


          


7/12/20 Antimicrobic Susceptibility - Final, Complete


          


7/12/20 Blood Culture - Preliminary, Resulted


          NO GROWTH AFTER 3 DAYS


6/30/20 Gram Stain - Final, Complete


          


6/30/20 Aerobic and Anaerobic Culture - Final, Complete


          


6/30/20 Antimicrobic Susceptibility - Final, Complete


          


6/15/20 Gram Stain - Final, Complete


          


6/15/20 Aerobic and Anaerobic Culture - Final, Complete


          


6/7/20 Urine Culture - Final, Complete


         


5/30/20 Gram Stain - Final, Complete


          


5/30/20 Aerobic Culture - Final, Complete


Medications





Current Medications


Sodium Chloride 1,000 ml @  1,000 mls/hr Q1H IV  Last administered on 3/16/20at 

03:00;  Start 3/16/20 at 03:00;  Stop 3/16/20 at 03:59;  Status DC


Ondansetron HCl (Zofran) 4 mg 1X  ONCE IVP  Last administered on 3/16/20at 

03:27;  Start 3/16/20 at 03:00;  Stop 3/16/20 at 03:01;  Status DC


Morphine Sulfate (Morphine Sulfate) 4 mg 1X  ONCE IV ;  Start 3/16/20 at 03:00; 

Stop 3/16/20 at 03:01;  Status Cancel


Ketorolac Tromethamine (Toradol 30mg Vial) 30 mg 1X  ONCE IV  Last administered 

on 3/16/20at 02:54;  Start 3/16/20 at 03:00;  Stop 3/16/20 at 03:01;  Status DC


Fentanyl Citrate (Fentanyl 2ml Vial) 25 mcg 1X  ONCE IVP  Last administered on 

3/16/20at 03:23;  Start 3/16/20 at 03:30;  Stop 3/16/20 at 03:31;  Status DC


Fentanyl Citrate (Fentanyl 2ml Vial) 100 mcg STK-MED ONCE .ROUTE ;  Start 

3/16/20 at 03:18;  Stop 3/16/20 at 03:18;  Status DC


Iohexol (Omnipaque 350 Mg/ml) 90 ml 1X  ONCE IV  Last administered on 3/16/20at 

03:25;  Start 3/16/20 at 03:30;  Stop 3/16/20 at 03:31;  Status DC


Info (CONTRAST GIVEN -- Rx MONITORING) 1 each PRN DAILY  PRN MC SEE COMMENTS;  

Start 3/16/20 at 03:30;  Stop 3/18/20 at 03:29;  Status DC


Hydromorphone HCl (Dilaudid) 0.5 mg 1X  ONCE IV  Last administered on 3/16/20at 

03:55;  Start 3/16/20 at 04:30;  Stop 3/16/20 at 04:32;  Status DC


Ondansetron HCl (Zofran) 4 mg PRN Q8HRS  PRN IV NAUSEA/VOMITING 1ST CHOICE;  

Start 3/16/20 at 05:00;  Stop 3/16/20 at 09:27;  Status DC


Morphine Sulfate (Morphine Sulfate) 2 mg PRN Q2HR  PRN IV SEVERE PAIN 7-10 Last 

administered on 3/17/20at 12:26;  Start 3/16/20 at 05:00;  Stop 3/17/20 at 

14:15;  Status DC


Sodium Chloride 1,000 ml @  125 mls/hr Q8H IV  Last administered on 3/16/20at 

20:56;  Start 3/16/20 at 05:00;  Stop 3/17/20 at 04:59;  Status DC


Hydromorphone HCl (Dilaudid) 0.5 mg PRN Q3HRS  PRN IV SEVERE PAIN 7-10 Last 

administered on 3/17/20at 10:06;  Start 3/16/20 at 05:00;  Stop 3/17/20 at 

12:01;  Status DC


Piperacillin Sod/ Tazobactam Sod 4.5 gm/Sodium Chloride 100 ml @  200 mls/hr 1X 

ONCE IV  Last administered on 3/16/20at 05:44;  Start 3/16/20 at 06:00;  Stop 

3/16/20 at 06:29;  Status DC


Ondansetron HCl (Zofran) 4 mg PRN Q4HRS  PRN IV NAUSEA/VOMITING 1ST CHOICE Last 

administered on 7/15/20at 07:46;  Start 3/16/20 at 09:30


Insulin Human Lispro (HumaLOG) 0-9 UNITS Q6HRS SQ  Last administered on 

7/12/20at 12:43;  Start 3/16/20 at 09:30


Dextrose (Dextrose 50%-Water Syringe) 12.5 gm PRN Q15MIN  PRN IV SEE COMMENTS;  

Start 3/16/20 at 09:30


Pantoprazole Sodium (PROTONIX VIAL for IV PUSH) 40 mg DAILYAC IVP  Last 

administered on 7/15/20at 07:45;  Start 3/16/20 at 11:30


Prochlorperazine Edisylate (Compazine) 10 mg PRN Q6HRS  PRN IV NAUSEA/VOMITING, 

2nd CHOICE Last administered on 7/13/20at 20:17;  Start 3/16/20 at 17:45


Atenolol (Tenormin) 100 mg DAILY PO ;  Start 3/17/20 at 09:00;  Stop 3/16/20 at 

20:08;  Status DC


Metoprolol Tartrate (Lopressor Vial) 2.5 mg Q6HRS IVP  Last administered on 

3/17/20at 05:51;  Start 3/16/20 at 20:15;  Stop 3/17/20 at 10:02;  Status DC


Metoprolol Tartrate (Lopressor Vial) 5 mg Q6HRS IVP  Last administered on 

3/26/20at 00:12;  Start 3/17/20 at 10:15;  Stop 3/28/20 at 08:48;  Status DC


Hydromorphone HCl (Dilaudid) 1 mg PRN Q3HRS  PRN IV SEVERE PAIN 7-10 Last 

administered on 3/23/20at 05:13;  Start 3/17/20 at 12:00;  Stop 3/31/20 at 

00:25;  Status DC


Lidocaine HCl (Buffered Lidocaine 1%) 3 ml STK-MED ONCE .ROUTE ;  Start 3/17/20 

at 12:55;  Stop 3/17/20 at 12:56;  Status DC


Albumin Human 500 ml @  125 mls/hr 1X  ONCE IV  Last administered on 3/17/20at 

14:33;  Start 3/17/20 at 14:30;  Stop 3/17/20 at 18:32;  Status DC


Norepinephrine Bitartrate 8 mg/ Dextrose 258 ml @  17.299 mls/ hr CONT  PRN IV 

PER PROTOCOL Last administered on 4/14/20at 12:48;  Start 3/17/20 at 15:30;  

Stop 4/17/20 at 09:19;  Status DC


Sodium Chloride 1,000 ml @  125 mls/hr Q8H IV  Last administered on 3/17/20at 

21:04;  Start 3/17/20 at 16:00;  Stop 3/18/20 at 02:42;  Status DC


Albumin Human 500 ml @  125 mls/hr PRN BID  PRN IV After every 2L NSS & BP < 

90mm Last administered on 6/30/20at 16:06;  Start 3/17/20 at 16:00;  Stop 7/3/20

at 09:30;  Status DC


Iohexol (Omnipaque 300 Mg/ml) 60 ml 1X  ONCE IV  Last administered on 3/17/20at 

17:20;  Start 3/17/20 at 17:00;  Stop 3/17/20 at 17:01;  Status DC


Info (CONTRAST GIVEN -- Rx MONITORING) 1 each PRN DAILY  PRN MC SEE COMMENTS;  

Start 3/17/20 at 17:00;  Stop 3/19/20 at 16:59;  Status DC


Meropenem 1 gm/ Sodium Chloride 100 ml @  200 mls/hr Q8HRS IV  Last administered

on 3/18/20at 05:45;  Start 3/17/20 at 20:00;  Stop 3/18/20 at 08:48;  Status DC


Furosemide (Lasix) 40 mg 1X  ONCE IVP  Last administered on 3/17/20at 22:12;  

Start 3/17/20 at 22:30;  Stop 3/17/20 at 22:31;  Status DC


Calcium Chloride 1000 mg/Sodium Chloride 110 ml @  220 mls/hr 1X  ONCE IV  Last 

administered on 3/17/20at 22:11;  Start 3/17/20 at 22:30;  Stop 3/17/20 at 

22:59;  Status DC


Albuterol Sulfate (Ventolin Neb Soln) 2.5 mg 1X  ONCE NEB  Last administered on 

3/18/20at 00:56;  Start 3/17/20 at 22:30;  Stop 3/17/20 at 22:31;  Status DC


Insulin Human Regular (HumuLIN R VIAL) 5 unit 1X  ONCE IV  Last administered on 

3/17/20at 22:14;  Start 3/17/20 at 22:30;  Stop 3/17/20 at 22:31;  Status DC


Magnesium Sulfate 50 ml @ 25 mls/hr 1X  ONCE IV  Last administered on 3/18/20at 

02:57;  Start 3/18/20 at 03:00;  Stop 3/18/20 at 04:59;  Status DC


Calcium Gluconate 1000 mg/Sodium Chloride 110 ml @  220 mls/hr 1X  ONCE IV  Last

administered on 3/18/20at 02:46;  Start 3/18/20 at 03:00;  Stop 3/18/20 at 

03:29;  Status DC


Sodium Chloride 1,000 ml @  200 mls/hr Q5H IV  Last administered on 3/18/20at 

02:46;  Start 3/18/20 at 03:00;  Stop 3/18/20 at 10:21;  Status DC


Calcium Gluconate 1000 mg/Sodium Chloride 110 ml @  220 mls/hr 1X  ONCE IV  Last

administered on 3/18/20at 03:21;  Start 3/18/20 at 03:30;  Stop 3/18/20 at 

03:59;  Status DC


Sodium Bicarbonate 50 meq/Sodium Chloride 1,050 ml @  75 mls/hr Q14H IV  Last 

administered on 3/22/20at 21:10;  Start 3/18/20 at 07:30;  Stop 3/23/20 at 

10:28;  Status DC


Calcium Gluconate 2000 mg/Sodium Chloride 120 ml @  220 mls/hr 1X  ONCE IV  Last

administered on 3/18/20at 09:05;  Start 3/18/20 at 07:30;  Stop 3/18/20 at 

08:02;  Status DC


Lidocaine HCl (Xylocaine-Mpf 1% 2ml Vial) 2 ml STK-MED ONCE .ROUTE ;  Start 

3/18/20 at 08:47;  Stop 3/18/20 at 08:47;  Status DC


Meropenem 500 mg/ Sodium Chloride 50 ml @  100 mls/hr Q12HR IV  Last 

administered on 3/23/20at 21:01;  Start 3/18/20 at 18:00;  Stop 3/24/20 at 

07:58;  Status DC


Lidocaine HCl (Buffered Lidocaine 1%) 3 ml STK-MED ONCE .ROUTE ;  Start 3/18/20 

at 09:46;  Stop 3/18/20 at 09:46;  Status DC


Lidocaine HCl (Buffered Lidocaine 1%) 6 ml 1X  ONCE INJ  Last administered on 

3/18/20at 10:26;  Start 3/18/20 at 10:15;  Stop 3/18/20 at 10:16;  Status DC


Info (Tpn Per Pharmacy) 1 each PRN DAILY  PRN MC SEE COMMENTS Last administered 

on 7/14/20at 10:47;  Start 3/18/20 at 12:00


Sodium Chloride 1,000 ml @  1,000 mls/hr Q1H PRN IV hypotension;  Start 3/18/20 

at 12:07;  Stop 3/18/20 at 18:06;  Status DC


Diphenhydramine HCl (Benadryl) 25 mg 1X PRN  PRN IV ITCHING;  Start 3/18/20 at 

12:15;  Stop 3/19/20 at 12:14;  Status DC


Diphenhydramine HCl (Benadryl) 25 mg 1X PRN  PRN IV ITCHING;  Start 3/18/20 at 

12:15;  Stop 3/19/20 at 12:14;  Status DC


Sodium Chloride 1,000 ml @  400 mls/hr Q2H30M PRN IV PATENCY;  Start 3/18/20 at 

12:07;  Stop 3/19/20 at 00:06;  Status DC


Info (PHARMACY MONITORING -- do not chart) 1 each PRN DAILY  PRN MC SEE 

COMMENTS;  Start 3/18/20 at 12:15;  Stop 3/20/20 at 08:13;  Status DC


Sodium Chloride 90 meq/Calcium Gluconate 10 meq/ Multivitamins 10 ml/Chromium/ 

Copper/Manganese/ Seleni/Zn 1 ml/ Total Parenteral Nutrition/Amino 

Acids/Dextrose/ Fat Emulsion Intravenous 55.005 ml  @ 2.292 mls/hr TPN  CONT IV 

;  Start 3/18/20 at 22:00;  Stop 3/18/20 at 12:33;  Status DC


Info (Tpn Per Pharmacy) 1 each PRN DAILY  PRN MC SEE COMMENTS;  Start 3/18/20 at

12:30;  Status UNV


Sodium Chloride 90 meq/Calcium Gluconate 10 meq/ Multivitamins 10 ml/Chromium/ 

Copper/Manganese/ Seleni/Zn 0.5 ml/ Total Parenteral Nutrition/Amino 

Acids/Dextrose/ Fat Emulsion Intravenous 1,512 ml @  63 mls/hr TPN  CONT IV  

Last administered on 3/18/20at 22:06;  Start 3/18/20 at 22:00;  Stop 3/19/20 at 

21:59;  Status DC


Calcium Carbonate/ Glycine (Tums) 500 mg PRN AFTMEALHC  PRN PO INDIGESTION;  

Start 3/18/20 at 17:45;  Stop 5/13/20 at 10:25;  Status DC


Calcium Gluconate (Calcium Gluconate) 2,000 mg 1X  ONCE IVP  Last administered 

on 3/19/20at 02:19;  Start 3/19/20 at 02:15;  Stop 3/19/20 at 02:16;  Status DC


Calcium Chloride 3000 mg/Sodium Chloride 1,030 ml @  50 mls/hr [id number] IV  Last 

administered on 3/21/20at 02:17;  Start 3/19/20 at 08:00;  Stop 3/21/20 at 

15:23;  Status DC


Lorazepam (Ativan Inj) 1 mg PRN Q4HRS  PRN IVP ANXIETY / AGITATION, 2nd choic 

Last administered on 4/17/20at 03:51;  Start 3/19/20 at 09:00;  Stop 4/17/20 at 

09:19;  Status DC


Sodium Chloride 1,000 ml @  1,000 mls/hr Q1H PRN IV hypotension;  Start 3/19/20 

at 08:56;  Stop 3/19/20 at 14:55;  Status DC


Albumin Human 200 ml @  200 mls/hr 1X PRN  PRN IV Hypotension;  Start 3/19/20 at

09:00;  Stop 3/19/20 at 14:59;  Status DC


Diphenhydramine HCl (Benadryl) 25 mg 1X PRN  PRN IV ITCHING;  Start 3/19/20 at 

09:00;  Stop 3/20/20 at 08:59;  Status DC


Diphenhydramine HCl (Benadryl) 25 mg 1X PRN  PRN IV ITCHING;  Start 3/19/20 at 

09:00;  Stop 3/20/20 at 08:59;  Status DC


Sodium Chloride 1,000 ml @  400 mls/hr Q2H30M PRN IV PATENCY;  Start 3/19/20 at 

08:56;  Stop 3/19/20 at 20:55;  Status DC


Info (PHARMACY MONITORING -- do not chart) 1 each PRN DAILY  PRN MC SEE 

COMMENTS;  Start 3/19/20 at 09:00;  Status UNV


Info (PHARMACY MONITORING -- do not chart) 1 each PRN DAILY  PRN MC SEE COMME

NTS;  Start 3/19/20 at 09:00;  Stop 3/20/20 at 08:13;  Status DC


Digoxin (Lanoxin) 500 mcg 1X  ONCE IV  Last administered on 3/19/20at 10:04;  

Start 3/19/20 at 10:00;  Stop 3/19/20 at 10:01;  Status DC


Digoxin (Lanoxin) 125 mcg 1X  ONCE IV  Last administered on 3/19/20at 17:10;  

Start 3/19/20 at 18:00;  Stop 3/19/20 at 18:01;  Status DC


Magnesium Sulfate 100 ml @  25 mls/hr 1X  ONCE IV  Last administered on 

3/19/20at 12:48;  Start 3/19/20 at 13:00;  Stop 3/19/20 at 16:59;  Status DC


Sodium Chloride 90 meq/Magnesium Sulfate 10 meq/ Calcium Gluconate 20 meq/ 

Multivitamins 10 ml/Chromium/ Copper/Manganese/ Seleni/Zn 0.5 ml/ Total Paren

teral Nutrition/Amino Acids/Dextrose/ Fat Emulsion Intravenous 1,512 ml @  63 

mls/hr TPN  CONT IV  Last administered on 3/19/20at 22:25;  Start 3/19/20 at 

22:00;  Stop 3/20/20 at 21:59;  Status DC


Sodium Chloride 1,000 ml @  1,000 mls/hr Q1H PRN IV hypotension;  Start 3/20/20 

at 08:05;  Stop 3/20/20 at 14:04;  Status DC


Albumin Human 200 ml @  200 mls/hr 1X  ONCE IV  Last administered on 3/20/20at 

08:57;  Start 3/20/20 at 08:15;  Stop 3/20/20 at 09:14;  Status DC


Diphenhydramine HCl (Benadryl) 25 mg 1X PRN  PRN IV ITCHING;  Start 3/20/20 at 

08:15;  Stop 3/21/20 at 08:14;  Status DC


Diphenhydramine HCl (Benadryl) 25 mg 1X PRN  PRN IV ITCHING;  Start 3/20/20 at 

08:15;  Stop 3/21/20 at 08:14;  Status DC


Sodium Chloride 1,000 ml @  400 mls/hr Q2H30M PRN IV PATENCY;  Start 3/20/20 at 

08:05;  Stop 3/20/20 at 20:04;  Status DC


Info (PHARMACY MONITORING -- do not chart) 1 each PRN DAILY  PRN MC SEE 

COMMENTS;  Start 3/20/20 at 08:15;  Stop 3/24/20 at 07:57;  Status DC


Sodium Chloride 90 meq/Potassium Chloride 15 meq/ Potassium Phosphate 10 mmol/ 

Magnesium Sulfate 10 meq/Calcium Gluconate 20 meq/ Multivitamins 10 ml/Chromium/

Copper/Manganese/ Seleni/Zn 0.5 ml/ Total Parenteral Nutrition/Amino 

Acids/Dextrose/ Fat Emulsion Intravenous 1,512 ml @  63 mls/hr TPN  CONT IV  

Last administered on 3/20/20at 21:01;  Start 3/20/20 at 22:00;  Stop 3/21/20 at 

21:59;  Status DC


Potassium Chloride/Water 100 ml @  100 mls/hr 1X  ONCE IV  Last administered on 

3/20/20at 14:09;  Start 3/20/20 at 14:00;  Stop 3/20/20 at 14:59;  Status DC


Benzocaine (Hurricaine One) 1 spray 1X  ONCE MM  Last administered on 3/20/20at 

16:38;  Start 3/20/20 at 14:30;  Stop 3/20/20 at 14:31;  Status DC


Lidocaine HCl (Glydo (Lidocaine) Jelly) 1 thomas 1X  ONCE MM  Last administered on 

3/20/20at 16:38;  Start 3/20/20 at 14:30;  Stop 3/20/20 at 14:31;  Status DC


Linezolid/Dextrose 300 ml @  300 mls/hr Q12HR IV  Last administered on 3/26/20at

21:04;  Start 3/20/20 at 20:00;  Stop 3/27/20 at 07:50;  Status DC


Acetaminophen (Tylenol) 650 mg PRN Q6HRS  PRN PO MILD PAIN / TEMP;  Start 

3/21/20 at 03:30;  Stop 3/21/20 at 03:36;  Status DC


Acetaminophen (Tylenol) 650 mg PRN Q6HRS  PRN PEG MILD PAIN / TEMP Last 

administered on 4/16/20at 19:56;  Start 3/21/20 at 03:36;  Stop 5/13/20 at 

10:25;  Status DC


Sodium Chloride 1,000 ml @  1,000 mls/hr Q1H PRN IV hypotension;  Start 3/21/20 

at 07:50;  Stop 3/21/20 at 13:49;  Status DC


Albumin Human 200 ml @  200 mls/hr 1X PRN  PRN IV Hypotension;  Start 3/21/20 at

08:00;  Stop 3/21/20 at 13:59;  Status DC


Sodium Chloride (Normal Saline Flush) 10 ml 1X PRN  PRN IV AP catheter pack;  

Start 3/21/20 at 08:00;  Stop 3/22/20 at 07:59;  Status DC


Sodium Chloride (Normal Saline Flush) 10 ml 1X PRN  PRN IV  catheter pack;  

Start 3/21/20 at 08:00;  Stop 3/22/20 at 07:59;  Status DC


Sodium Chloride 1,000 ml @  400 mls/hr Q2H30M PRN IV PATENCY;  Start 3/21/20 at 

07:50;  Stop 3/21/20 at 19:49;  Status DC


Info (PHARMACY MONITORING -- do not chart) 1 each PRN DAILY  PRN MC SEE 

COMMENTS;  Start 3/21/20 at 08:00;  Status UNV


Info (PHARMACY MONITORING -- do not chart) 1 each PRN DAILY  PRN MC SEE 

COMMENTS;  Start 3/21/20 at 08:00;  Stop 3/23/20 at 08:25;  Status DC


Sodium Chloride 90 meq/Potassium Chloride 15 meq/ Potassium Phosphate 10 mmol/ 

Magnesium Sulfate 10 meq/Calcium Gluconate 20 meq/ Multivitamins 10 ml/Chromium/

Copper/Manganese/ Seleni/Zn 0.5 ml/ Total Parenteral Nutrition/Amino 

Acids/Dextrose/ Fat Emulsion Intravenous 1,512 ml @  63 mls/hr TPN  CONT IV  

Last administered on 3/21/20at 20:57;  Start 3/21/20 at 22:00;  Stop 3/22/20 at 

21:59;  Status DC


Sodium Chloride 90 meq/Potassium Chloride 15 meq/ Potassium Phosphate 15 mmol/ 

Magnesium Sulfate 10 meq/Calcium Gluconate 20 meq/ Multivitamins 10 ml/Chromium/

Copper/Manganese/ Seleni/Zn 0.5 ml/ Total Parenteral Nutrition/Amino 

Acids/Dextrose/ Fat Emulsion Intravenous 1,512 ml @  63 mls/hr TPN  CONT IV ;  

Start 3/22/20 at 22:00;  Stop 3/22/20 at 14:16;  Status DC


Sodium Chloride 90 meq/Potassium Chloride 15 meq/ Potassium Phosphate 15 mmol/ 

Magnesium Sulfate 10 meq/Calcium Gluconate 20 meq/ Multivitamins 10 ml/Chromium/

Copper/Manganese/ Seleni/Zn 0.5 ml/ Total Parenteral Nutrition/Amino 

Acids/Dextrose/ Fat Emulsion Intravenous 1,200 ml @  50 mls/hr TPN  CONT IV ;  

Start 3/22/20 at 22:00;  Stop 3/22/20 at 14:17;  Status DC


Sodium Chloride 90 meq/Potassium Chloride 15 meq/ Potassium Phosphate 10 mmol/ 

Magnesium Sulfate 10 meq/Calcium Gluconate 20 meq/ Multivitamins 10 ml/Chromium/

Copper/Manganese/ Seleni/Zn 0.5 ml/ Total Parenteral Nutrition/Amino 

Acids/Dextrose/ Fat Emulsion Intravenous 1,200 ml @  50 mls/hr TPN  CONT IV  

Last administered on 3/22/20at 23:29;  Start 3/22/20 at 22:00;  Stop 3/23/20 at 

21:59;  Status DC


Sodium Chloride 1,000 ml @  1,000 mls/hr Q1H PRN IV hypotension;  Start 3/23/20 

at 07:28;  Stop 3/23/20 at 13:27;  Status DC


Albumin Human 200 ml @  200 mls/hr 1X  ONCE IV  Last administered on 3/23/20at 

08:51;  Start 3/23/20 at 07:30;  Stop 3/23/20 at 08:29;  Status DC


Diphenhydramine HCl (Benadryl) 25 mg 1X PRN  PRN IV ITCHING;  Start 3/23/20 at 

07:30;  Stop 3/24/20 at 07:29;  Status DC


Diphenhydramine HCl (Benadryl) 25 mg 1X PRN  PRN IV ITCHING;  Start 3/23/20 at 

07:30;  Stop 3/24/20 at 07:29;  Status DC


Sodium Chloride 1,000 ml @  400 mls/hr Q2H30M PRN IV PATENCY;  Start 3/23/20 at 

07:28;  Stop 3/23/20 at 19:27;  Status DC


Info (PHARMACY MONITORING -- do not chart) 1 each PRN DAILY  PRN MC SEE 

COMMENTS;  Start 3/23/20 at 07:30;  Stop 4/3/20 at 13:01;  Status DC


Metronidazole 100 ml @  100 mls/hr Q6HRS IV  Last administered on 4/8/20at 

06:26;  Start 3/23/20 at 08:30;  Stop 4/8/20 at 09:58;  Status DC


Micafungin Sodium 100 mg/Dextrose 100 ml @  100 mls/hr Q24H IV  Last 

administered on 4/30/20at 08:18;  Start 3/23/20 at 09:00;  Stop 4/30/20 at 

20:58;  Status DC


Propofol 0 ml @ As Directed STK-MED ONCE IV ;  Start 3/23/20 at 07:53;  Stop 

3/23/20 at 07:53;  Status DC


Etomidate (Amidate) 20 mg STK-MED ONCE IV ;  Start 3/23/20 at 07:53;  Stop 

3/23/20 at 07:54;  Status DC


Midazolam HCl (Versed) 5 mg STK-MED ONCE .ROUTE ;  Start 3/23/20 at 07:57;  Stop

3/23/20 at 07:57;  Status DC


Fentanyl Citrate 30 ml @ 0 mls/hr CONT  PRN IV SEE PROTOCOL Last administered on

4/17/20at 06:12;  Start 3/23/20 at 08:15;  Stop 4/17/20 at 09:19;  Status DC


Artificial Tears (Artificial Tears) 1 drop PRN Q1HR  PRN OU DRY EYE, 1st choice;

 Start 3/23/20 at 08:15;  Stop 4/29/20 at 05:31;  Status DC


Midazolam HCl 50 mg/Sodium Chloride 50 ml @ 0 mls/hr CONT  PRN IV SEE PROTOCOL 

Last administered on 3/26/20at 22:39;  Start 3/23/20 at 08:15;  Stop 3/28/20 at 

15:59;  Status DC


Etomidate (Amidate) 8 mg 1X  ONCE IV  Last administered on 3/23/20at 08:33;  

Start 3/23/20 at 08:30;  Stop 3/23/20 at 08:31;  Status DC


Succinylcholine Chloride (Anectine) 120 mg 1X  ONCE IV  Last administered on 

3/23/20at 08:34;  Start 3/23/20 at 08:30;  Stop 3/23/20 at 08:31;  Status DC


Midazolam HCl (Versed) 5 mg 1X  ONCE IV ;  Start 3/23/20 at 08:30;  Stop 3/23/20

at 08:31;  Status DC


Potassium Chloride 15 meq/ Bicarbonate Dialysis Soln w/ out KCl 5,007.5 ml  @ 

1,000 mls/ hr Q5H1M IV  Last administered on 3/24/20at 11:11;  Start 3/23/20 at 

12:00;  Stop 3/24/20 at 11:15;  Status DC


Potassium Chloride 15 meq/ Bicarbonate Dialysis Soln w/ out KCl 5,007.5 ml  @ 

1,000 mls/ hr Q5H1M IV  Last administered on 3/24/20at 11:12;  Start 3/23/20 at 

12:00;  Stop 3/24/20 at 11:17;  Status DC


Potassium Chloride 15 meq/ Bicarbonate Dialysis Soln w/ out KCl 5,007.5 ml  @ 

1,000 mls/ hr Q5H1M IV  Last administered on 3/24/20at 11:11;  Start 3/23/20 at 

12:00;  Stop 3/24/20 at 11:19;  Status DC


Sodium Chloride 90 meq/Potassium Chloride 15 meq/ Potassium Phosphate 10 mmol/ 

Magnesium Sulfate 10 meq/Calcium Gluconate 20 meq/ Multivitamins 10 ml/Chromium/

Copper/Manganese/ Seleni/Zn 0.5 ml/ Total Parenteral Nutrition/Amino 

Acids/Dextrose/ Fat Emulsion Intravenous 1,400 ml @  58.333 mls/ hr TPN  CONT IV

 Last administered on 3/23/20at 21:42;  Start 3/23/20 at 22:00;  Stop 3/24/20 at

21:59;  Status DC


Heparin Sodium (Porcine) (Heparin Sodium) 5,000 unit Q8HRS SQ  Last administered

on 3/28/20at 05:55;  Start 3/23/20 at 15:00;  Stop 3/28/20 at 13:28;  Status DC


Meropenem 500 mg/ Sodium Chloride 50 ml @  100 mls/hr Q6HRS IV  Last 

administered on 3/25/20at 06:00;  Start 3/24/20 at 09:00;  Stop 3/25/20 at 

07:29;  Status DC


Potassium Phosphate 20 mmol/ Sodium Chloride 106.6667 ml @  51.667 m... 1X  ONCE

IV  Last administered on 3/24/20at 11:22;  Start 3/24/20 at 10:15;  Stop 3/24/20

at 12:18;  Status DC


Acetaminophen (Tylenol Supp) 650 mg PRN Q6HRS  PRN AR MILD PAIN / TEMP > 100.3'F

Last administered on 7/15/20at 08:14;  Start 3/24/20 at 10:30


Potassium Chloride/Water 100 ml @  100 mls/hr Q1H IV  Last administered on 

3/24/20at 12:12;  Start 3/24/20 at 11:00;  Stop 3/24/20 at 12:59;  Status DC


Potassium Chloride 20 meq/ Bicarbonate Dialysis Soln w/ out KCl 5,010 ml @  

1,000 mls/hr Q5H1M IV  Last administered on 3/25/20at 08:48;  Start 3/24/20 at 

12:00;  Stop 3/25/20 at 13:03;  Status DC


Potassium Chloride 20 meq/ Bicarbonate Dialysis Soln w/ out KCl 5,010 ml @  

1,000 mls/hr Q5H1M IV  Last administered on 3/29/20at 14:52;  Start 3/24/20 at 

11:30;  Stop 3/29/20 at 19:59;  Status DC


Potassium Chloride 20 meq/ Bicarbonate Dialysis Soln w/ out KCl 5,010 ml @  

1,000 mls/hr Q5H1M IV  Last administered on 3/29/20at 14:53;  Start 3/24/20 at 

11:30;  Stop 3/29/20 at 19:59;  Status DC


Sodium Chloride 90 meq/Potassium Chloride 15 meq/ Potassium Phosphate 15 mmol/ 

Magnesium Sulfate 10 meq/Calcium Gluconate 15 meq/ Multivitamins 10 ml/Chromium/

Copper/Manganese/ Seleni/Zn 0.5 ml/ Total Parenteral Nutrition/Amino 

Acids/Dextrose/ Fat Emulsion Intravenous 1,400 ml @  58.333 mls/ hr TPN  CONT IV

 Last administered on 3/24/20at 22:17;  Start 3/24/20 at 22:00;  Stop 3/25/20 at

21:59;  Status DC


Cefepime HCl (Maxipime) 2 gm Q12HR IVP  Last administered on 4/7/20at 20:56;  

Start 3/25/20 at 09:00;  Stop 4/8/20 at 09:58;  Status DC


Daptomycin 500 mg/ Sodium Chloride 50 ml @  100 mls/hr Q48H IV  Last 

administered on 4/10/20at 09:57;  Start 3/25/20 at 08:30;  Stop 4/10/20 at 

10:07;  Status DC


Lidocaine HCl (Buffered Lidocaine 1%) 3 ml 1X  ONCE INJ  Last administered on 

3/25/20at 10:27;  Start 3/25/20 at 10:30;  Stop 3/25/20 at 10:31;  Status DC


Potassium Phosphate 20 mmol/ Sodium Chloride 106.6667 ml @  51.667 m... 1X  ONCE

IV  Last administered on 3/25/20at 12:51;  Start 3/25/20 at 13:00;  Stop 3/25/20

at 15:03;  Status DC


Sodium Chloride 90 meq/Potassium Chloride 15 meq/ Potassium Phosphate 18 mmol/ 

Magnesium Sulfate 8 meq/Calcium Gluconate 15 meq/ Multivitamins 10 ml/Chromium/ 

Copper/Manganese/ Seleni/Zn 0.5 ml/ Total Parenteral Nutrition/Amino 

Acids/Dextrose/ Fat Emulsion Intravenous 1,400 ml @  58.333 mls/ hr TPN  CONT IV

 Last administered on 3/25/20at 22:16;  Start 3/25/20 at 22:00;  Stop 3/26/20 at

21:59;  Status DC


Potassium Chloride 20 meq/ Bicarbonate Dialysis Soln w/ out KCl 5,010 ml @  

1,000 mls/hr Q5H1M IV  Last administered on 3/29/20at 14:54;  Start 3/25/20 at 

16:00;  Stop 3/29/20 at 19:59;  Status DC


Multi-Ingred Cream/Lotion/Oil/ Oint (Artificial Tears Eye Ointment) 1 thomas PRN 

Q1HR  PRN OU DRY EYE, 2nd choice Last administered on 4/13/20at 08:19;  Start 

3/25/20 at 17:30;  Stop 6/3/20 at 14:39;  Status DC


Sodium Chloride 90 meq/Potassium Chloride 15 meq/ Potassium Phosphate 18 mmol/ 

Magnesium Sulfate 8 meq/Calcium Gluconate 15 meq/ Multivitamins 10 ml/Chromium/ 

Copper/Manganese/ Seleni/Zn 0.5 ml/ Total Parenteral Nutrition/Amino 

Acids/Dextrose/ Fat Emulsion Intravenous 1,400 ml @  58.333 mls/ hr TPN  CONT IV

 Last administered on 3/26/20at 22:00;  Start 3/26/20 at 22:00;  Stop 3/27/20 at

21:59;  Status DC


Albumin Human 500 ml @  125 mls/hr 1X  ONCE IV ;  Start 3/26/20 at 14:15;  Stop 

3/26/20 at 18:14;  Status DC


Sodium Chloride 90 meq/Potassium Chloride 15 meq/ Potassium Phosphate 18 mmol/ 

Magnesium Sulfate 8 meq/Calcium Gluconate 15 meq/ Multivitamins 10 ml/Chromium/ 

Copper/Manganese/ Seleni/Zn 0.5 ml/ Insulin Human Regular 10 unit/ Total 

Parenteral Nutrition/Amino Acids/Dextrose/ Fat Emulsion Intravenous 1,400 ml @  

58.333 mls/ hr TPN  CONT IV  Last administered on 3/27/20at 21:43;  Start 3/27/

20 at 22:00;  Stop 3/28/20 at 21:59;  Status DC


Lidocaine HCl (Buffered Lidocaine 1%) 3 ml STK-MED ONCE .ROUTE ;  Start 3/25/20 

at 10:00;  Stop 3/27/20 at 13:57;  Status DC


Midazolam HCl 100 mg/Sodium Chloride 100 ml @ 7 mls/hr CONT  PRN IV SEE PROTOCOL

Last administered on 4/8/20at 15:35;  Start 3/28/20 at 16:00;  Stop 6/3/20 at 

14:38;  Status DC


Sodium Chloride 90 meq/Potassium Chloride 15 meq/ Potassium Phosphate 18 mmol/ 

Magnesium Sulfate 8 meq/Calcium Gluconate 15 meq/ Multivitamins 10 ml/Chromium/ 

Copper/Manganese/ Seleni/Zn 0.5 ml/ Insulin Human Regular 15 unit/ Total 

Parenteral Nutrition/Amino Acids/Dextrose/ Fat Emulsion Intravenous 1,400 ml @  

58.333 mls/ hr TPN  CONT IV  Last administered on 3/28/20at 20:34;  Start 

3/28/20 at 22:00;  Stop 3/29/20 at 21:59;  Status DC


Info ([facility] Electrolyte Protocol) 1 ea CONT PRN  PRN MC PER PROTOCOL;  Start 

3/29/20 at 13:15


Sodium Chloride 90 meq/Potassium Chloride 15 meq/ Potassium Phosphate 18 mmol/ 

Magnesium Sulfate 8 meq/Calcium Gluconate 15 meq/ Multivitamins 10 ml/Chromium/ 

Copper/Manganese/ Seleni/Zn 0.5 ml/ Insulin Human Regular 15 unit/ Total 

Parenteral Nutrition/Amino Acids/Dextrose/ Fat Emulsion Intravenous 1,400 ml @  

58.333 mls/ hr TPN  CONT IV  Last administered on 3/29/20at 22:05;  Start 

3/29/20 at 22:00;  Stop 3/30/20 at 21:59;  Status DC


Potassium Chloride 15 meq/ Bicarbonate Dialysis Soln w/ out KCl 5,007.5 ml  @ 

1,000 mls/ hr Q5H1M IV  Last administered on 4/1/20at 18:14;  Start 3/29/20 at 

20:00;  Stop 4/2/20 at 13:08;  Status DC


Potassium Chloride 15 meq/ Bicarbonate Dialysis Soln w/ out KCl 5,007.5 ml  @ 

1,000 mls/ hr Q5H1M IV  Last administered on 4/1/20at 18:14;  Start 3/29/20 at 

20:00;  Stop 4/2/20 at 13:08;  Status DC


Potassium Chloride 15 meq/ Bicarbonate Dialysis Soln w/ out KCl 5,007.5 ml  @ 

1,000 mls/ hr Q5H1M IV  Last administered on 4/1/20at 18:14;  Start 3/29/20 at 

20:00;  Stop 4/2/20 at 13:08;  Status DC


Iohexol (Omnipaque 240 Mg/ml) 30 ml 1X  ONCE PO  Last administered on 3/30/20at 

11:30;  Start 3/30/20 at 11:30;  Stop 3/30/20 at 11:33;  Status DC


Info (CONTRAST GIVEN -- Rx MONITORING) 1 each PRN DAILY  PRN MC SEE COMMENTS;  

Start 3/30/20 at 11:45;  Stop 4/1/20 at 11:44;  Status DC


Sodium Chloride 90 meq/Potassium Chloride 15 meq/ Potassium Phosphate 18 mmol/ 

Magnesium Sulfate 8 meq/Calcium Gluconate 15 meq/ Multivitamins 10 ml/Chromium/ 

Copper/Manganese/ Seleni/Zn 0.5 ml/ Insulin Human Regular 15 unit/ Total Pa

renteral Nutrition/Amino Acids/Dextrose/ Fat Emulsion Intravenous 1,400 ml @  

58.333 mls/ hr TPN  CONT IV  Last administered on 3/30/20at 21:47;  Start 

3/30/20 at 22:00;  Stop 3/31/20 at 21:59;  Status DC


Sodium Chloride 90 meq/Potassium Chloride 15 meq/ Potassium Phosphate 18 mmol/ 

Magnesium Sulfate 8 meq/Calcium Gluconate 15 meq/ Multivitamins 10 ml/Chromium/ 

Copper/Manganese/ Seleni/Zn 0.5 ml/ Insulin Human Regular 20 unit/ Total 

Parenteral Nutrition/Amino Acids/Dextrose/ Fat Emulsion Intravenous 1,400 ml @  

58.333 mls/ hr TPN  CONT IV  Last administered on 3/31/20at 21:36;  Start 

3/31/20 at 22:00;  Stop 4/1/20 at 21:59;  Status DC


Alteplase, Recombinant (Cathflo For Central Catheter Clearance) 1 mg 1X  ONCE 

INT CAT  Last administered on 3/31/20at 20:03;  Start 3/31/20 at 19:30;  Stop 

3/31/20 at 19:46;  Status DC


Alteplase, Recombinant (Cathflo For Central Catheter Clearance) 1 mg 1X  ONCE 

INT CAT  Last administered on 3/31/20at 22:05;  Start 3/31/20 at 22:00;  Stop 

3/31/20 at 22:01;  Status DC


Sodium Chloride 90 meq/Potassium Chloride 15 meq/ Potassium Phosphate 18 mmol/ 

Magnesium Sulfate 8 meq/Calcium Gluconate 15 meq/ Multivitamins 10 ml/Chromium/ 

Copper/Manganese/ Seleni/Zn 0.5 ml/ Insulin Human Regular 20 unit/ Total 

Parenteral Nutrition/Amino Acids/Dextrose/ Fat Emulsion Intravenous 1,400 ml @  

58.333 mls/ hr TPN  CONT IV  Last administered on 4/1/20at 21:30;  Start 4/1/20 

at 22:00;  Stop 4/2/20 at 21:59;  Status DC


Dexmedetomidine HCl 400 mcg/ Sodium Chloride 100 ml @ 0 mls/hr CONT  PRN IV 

ANXIETY / AGITATION Last administered on 5/30/20at 12:57;  Start 4/2/20 at 

08:15;  Stop 5/30/20 at 18:31;  Status DC


Sodium Chloride 500 ml @  500 mls/hr 1X PRN  PRN IV ELEVATED BP, SEE COMMENTS;  

Start 4/2/20 at 08:15


Atropine Sulfate (ATROPINE 0.5mg SYRINGE) 0.5 mg PRN Q5MIN  PRN IV SEE COMMENTS;

 Start 4/2/20 at 08:15


Furosemide (Lasix) 20 mg 1X  ONCE IVP  Last administered on 4/2/20at 08:19;  

Start 4/2/20 at 08:15;  Stop 4/2/20 at 08:16;  Status DC


Lidocaine HCl (Buffered Lidocaine 1%) 3 ml STK-MED ONCE .ROUTE ;  Start 4/2/20 

at 08:39;  Stop 4/2/20 at 08:39;  Status DC


Lidocaine HCl (Buffered Lidocaine 1%) 6 ml 1X  ONCE INJ  Last administered on 

4/2/20at 09:05;  Start 4/2/20 at 09:00;  Stop 4/2/20 at 09:06;  Status DC


Sodium Chloride 90 meq/Potassium Chloride 15 meq/ Potassium Phosphate 18 mmol/ 

Magnesium Sulfate 8 meq/Calcium Gluconate 15 meq/ Multivitamins 10 ml/Chromium/ 

Copper/Manganese/ Seleni/Zn 0.5 ml/ Insulin Human Regular 20 unit/ Total 

Parenteral Nutrition/Amino Acids/Dextrose/ Fat Emulsion Intravenous 1,400 ml @  

58.333 mls/ hr TPN  CONT IV  Last administered on 4/2/20at 22:45;  Start 4/2/20 

at 22:00;  Stop 4/3/20 at 21:59;  Status DC


Sodium Chloride 1,000 ml @  1,000 mls/hr Q1H PRN IV hypotension;  Start 4/3/20 

at 07:30;  Stop 4/3/20 at 13:29;  Status DC


Albumin Human 200 ml @  200 mls/hr 1X PRN  PRN IV Hypotension Last administered 

on 4/3/20at 09:36;  Start 4/3/20 at 07:30;  Stop 4/3/20 at 13:29;  Status DC


Sodium Chloride (Normal Saline Flush) 10 ml 1X PRN  PRN IV AP catheter pack;  

Start 4/3/20 at 07:30;  Stop 4/3/20 at 21:29;  Status DC


Sodium Chloride (Normal Saline Flush) 10 ml 1X PRN  PRN IV  catheter pack;  

Start 4/3/20 at 07:30;  Stop 4/4/20 at 07:29;  Status DC


Sodium Chloride 1,000 ml @  400 mls/hr Q2H30M PRN IV PATENCY;  Start 4/3/20 at 

07:30;  Stop 4/3/20 at 19:29;  Status DC


Info (PHARMACY MONITORING -- do not chart) 1 each PRN DAILY  PRN MC SEE 

COMMENTS;  Start 4/3/20 at 07:30;  Stop 4/3/20 at 13:02;  Status DC


Info (PHARMACY MONITORING -- do not chart) 1 each PRN DAILY  PRN MC SEE 

COMMENTS;  Start 4/3/20 at 07:30;  Stop 4/5/20 at 12:45;  Status DC


Sodium Chloride 90 meq/Potassium Chloride 15 meq/ Potassium Phosphate 10 mmol/ 

Magnesium Sulfate 8 meq/Calcium Gluconate 15 meq/ Multivitamins 10 ml/Chromium/ 

Copper/Manganese/ Seleni/Zn 0.5 ml/ Insulin Human Regular 25 unit/ Total 

Parenteral Nutrition/Amino Acids/Dextrose/ Fat Emulsion Intravenous 1,400 ml @  

58.333 mls/ hr TPN  CONT IV  Last administered on 4/3/20at 22:19;  Start 4/3/20 

at 22:00;  Stop 4/4/20 at 21:59;  Status DC


Heparin Sodium (Porcine) (Heparin Sodium) 5,000 unit Q12HR SQ  Last administered

on 4/26/20at 08:59;  Start 4/3/20 at 21:00;  Stop 4/26/20 at 10:05;  Status DC


Ondansetron HCl (Zofran) 4 mg PRN Q6HRS  PRN IV NAUSEA/VOMITING;  Start 4/6/20 

at 07:00;  Stop 4/7/20 at 06:59;  Status DC


Fentanyl Citrate (Fentanyl 2ml Vial) 25 mcg PRN Q5MIN  PRN IV MILD PAIN 1-3;  

Start 4/6/20 at 07:00;  Stop 4/7/20 at 06:59;  Status DC


Fentanyl Citrate (Fentanyl 2ml Vial) 50 mcg PRN Q5MIN  PRN IV MODERATE TO SEVERE

PAIN;  Start 4/6/20 at 07:00;  Stop 4/7/20 at 06:59;  Status DC


Ringer's Solution 1,000 ml @  30 mls/hr Q24H IV ;  Start 4/6/20 at 07:00;  Stop 

4/6/20 at 18:59;  Status DC


Lidocaine HCl (Xylocaine-Mpf 1% 2ml Vial) 2 ml PRN 1X  PRN ID PRIOR TO IV START;

 Start 4/6/20 at 07:00;  Stop 4/7/20 at 06:59;  Status DC


Prochlorperazine Edisylate (Compazine) 5 mg PACU PRN  PRN IV NAUSEA, MRX1;  

Start 4/6/20 at 07:00;  Stop 4/7/20 at 06:59;  Status DC


Sodium Chloride 1,000 ml @  1,000 mls/hr Q1H PRN IV hypotension;  Start 4/4/20 

at 09:10;  Stop 4/4/20 at 15:09;  Status DC


Albumin Human 200 ml @  200 mls/hr 1X PRN  PRN IV Hypotension Last administered 

on 4/4/20at 10:10;  Start 4/4/20 at 09:15;  Stop 4/4/20 at 15:14;  Status DC


Sodium Chloride 1,000 ml @  400 mls/hr Q2H30M PRN IV PATENCY;  Start 4/4/20 at 

09:10;  Stop 4/4/20 at 21:09;  Status DC


Info (PHARMACY MONITORING -- do not chart) 1 each PRN DAILY  PRN MC SEE 

COMMENTS;  Start 4/4/20 at 09:15;  Stop 4/5/20 at 12:45;  Status DC


Info (PHARMACY MONITORING -- do not chart) 1 each PRN DAILY  PRN MC SEE 

COMMENTS;  Start 4/4/20 at 09:15;  Stop 4/5/20 at 12:45;  Status DC


Sodium Chloride 90 meq/Potassium Chloride 15 meq/ Potassium Phosphate 10 mmol/ 

Magnesium Sulfate 8 meq/Calcium Gluconate 15 meq/ Multivitamins 10 ml/Chromium/ 

Copper/Manganese/ Seleni/Zn 0.5 ml/ Insulin Human Regular 25 unit/ Total 

Parenteral Nutrition/Amino Acids/Dextrose/ Fat Emulsion Intravenous 1,400 ml @  

58.333 mls/ hr TPN  CONT IV  Last administered on 4/4/20at 22:10;  Start 4/4/20 

at 22:00;  Stop 4/5/20 at 21:59;  Status DC


Magnesium Sulfate 50 ml @ 25 mls/hr PRN DAILY  PRN IV for Mag < 1.7 on am labs 

Last administered on 6/18/20at 10:57;  Start 4/5/20 at 09:15


Sodium Chloride 90 meq/Potassium Chloride 15 meq/ Potassium Phosphate 10 mmol/ 

Magnesium Sulfate 8 meq/Calcium Gluconate 15 meq/ Multivitamins 10 ml/Chromium/ 

Copper/Manganese/ Seleni/Zn 0.5 ml/ Insulin Human Regular 25 unit/ Total 

Parenteral Nutrition/Amino Acids/Dextrose/ Fat Emulsion Intravenous 1,400 ml @  

58.333 mls/ hr TPN  CONT IV  Last administered on 4/5/20at 21:20;  Start 4/5/20 

at 22:00;  Stop 4/6/20 at 21:59;  Status DC


Sodium Chloride 1,000 ml @  1,000 mls/hr Q1H PRN IV hypotension;  Start 4/5/20 

at 12:23;  Stop 4/5/20 at 18:22;  Status DC


Albumin Human 200 ml @  200 mls/hr 1X  ONCE IV  Last administered on 4/5/20at 

13:34;  Start 4/5/20 at 12:30;  Stop 4/5/20 at 13:29;  Status DC


Diphenhydramine HCl (Benadryl) 25 mg 1X PRN  PRN IV ITCHING;  Start 4/5/20 at 

12:30;  Stop 4/6/20 at 12:29;  Status DC


Diphenhydramine HCl (Benadryl) 25 mg 1X PRN  PRN IV ITCHING;  Start 4/5/20 at 

12:30;  Stop 4/6/20 at 12:29;  Status DC


Info (PHARMACY MONITORING -- do not chart) 1 each PRN DAILY  PRN MC SEE MIKEY

TS;  Start 4/5/20 at 12:30;  Status Cancel


Bupivacaine HCl/ Epinephrine Bitart (Sensorcain-Epi 0.5%-1:[id number] Mpf) 30 ml 

STK-MED ONCE .ROUTE  Last administered on 4/6/20at 11:44;  Start 4/6/20 at 

11:00;  Stop 4/6/20 at 11:01;  Status DC


Cellulose (Surgicel Fibrillar 1x2) 1 each STK-MED ONCE .ROUTE ;  Start 4/6/20 at

11:00;  Stop 4/6/20 at 11:01;  Status DC


Sodium Chloride 90 meq/Potassium Chloride 15 meq/ Potassium Phosphate 10 mmol/ 

Magnesium Sulfate 12 meq/Calcium Gluconate 15 meq/ Multivitamins 10 ml/Chromium/

Copper/Manganese/ Seleni/Zn 0.5 ml/ Insulin Human Regular 25 unit/ Total 

Parenteral Nutrition/Amino Acids/Dextrose/ Fat Emulsion Intravenous 1,400 ml @  

58.333 mls/ hr TPN  CONT IV  Last administered on 4/6/20at 22:24;  Start 4/6/20 

at 22:00;  Stop 4/7/20 at 21:59;  Status DC


Propofol 20 ml @ As Directed STK-MED ONCE IV ;  Start 4/6/20 at 11:07;  Stop 

4/6/20 at 11:07;  Status DC


Cellulose (Surgicel Hemostat 4x8) 1 each STK-MED ONCE .ROUTE  Last administered 

on 4/6/20at 11:44;  Start 4/6/20 at 11:55;  Stop 4/6/20 at 11:56;  Status DC


Sevoflurane (Ultane) 60 ml STK-MED ONCE IH ;  Start 4/6/20 at 12:46;  Stop 

4/6/20 at 12:46;  Status DC


Sodium Chloride 1,000 ml @  1,000 mls/hr Q1H PRN IV hypotension;  Start 4/6/20 

at 13:51;  Stop 4/6/20 at 19:50;  Status DC


Albumin Human 200 ml @  200 mls/hr 1X PRN  PRN IV Hypotension Last administered 

on 4/6/20at 14:51;  Start 4/6/20 at 14:00;  Stop 4/6/20 at 19:59;  Status DC


Diphenhydramine HCl (Benadryl) 25 mg 1X PRN  PRN IV ITCHING;  Start 4/6/20 at 

14:00;  Stop 4/7/20 at 13:59;  Status DC


Diphenhydramine HCl (Benadryl) 25 mg 1X PRN  PRN IV ITCHING;  Start 4/6/20 at 

14:00;  Stop 4/7/20 at 13:59;  Status DC


Sodium Chloride 1,000 ml @  400 mls/hr Q2H30M PRN IV PATENCY;  Start 4/6/20 at 

13:51;  Stop 4/7/20 at 01:50;  Status DC


Info (PHARMACY MONITORING -- do not chart) 1 each PRN DAILY  PRN MC SEE 

COMMENTS;  Start 4/6/20 at 14:00;  Stop 4/9/20 at 08:16;  Status DC


Heparin Sodium (Porcine) (Hep Lock Adult) 500 unit STK-MED ONCE IVP ;  Start 

4/7/20 at 09:29;  Stop 4/7/20 at 09:30;  Status DC


Sodium Chloride 1,000 ml @  1,000 mls/hr Q1H PRN IV hypotension;  Start 4/7/20 

at 10:43;  Stop 4/7/20 at 16:42;  Status DC


Sodium Chloride 1,000 ml @  400 mls/hr Q2H30M PRN IV PATENCY;  Start 4/7/20 at 

10:43;  Stop 4/7/20 at 22:42;  Status DC


Info (PHARMACY MONITORING -- do not chart) 1 each PRN DAILY  PRN MC SEE 

COMMENTS;  Start 4/7/20 at 10:45;  Status UNV


Info (PHARMACY MONITORING -- do not chart) 1 each PRN DAILY  PRN MC SEE 

COMMENTS;  Start 4/7/20 at 10:45;  Status UNV


Sodium Chloride 90 meq/Potassium Chloride 15 meq/ Magnesium Sulfate 12 

meq/Calcium Gluconate 15 meq/ Multivitamins 10 ml/Chromium/ Copper/Manganese/ 

Seleni/Zn 0.5 ml/ Insulin Human Regular 25 unit/ Total Parenteral 

Nutrition/Amino Acids/Dextrose/ Fat Emulsion Intravenous 1,400 ml @  58.333 mls/

hr TPN  CONT IV  Last administered on 4/7/20at 22:13;  Start 4/7/20 at 22:00;  

Stop 4/8/20 at 21:59;  Status DC


Sodium Chloride 1,000 ml @  1,000 mls/hr Q1H PRN IV hypotension;  Start 4/8/20 

at 07:50;  Stop 4/8/20 at 13:49;  Status DC


Albumin Human 200 ml @  200 mls/hr 1X  ONCE IV ;  Start 4/8/20 at 08:00;  Stop 

4/8/20 at 08:53;  Status DC


Diphenhydramine HCl (Benadryl) 25 mg 1X PRN  PRN IV ITCHING;  Start 4/8/20 at 

08:00;  Stop 4/9/20 at 07:59;  Status DC


Diphenhydramine HCl (Benadryl) 25 mg 1X PRN  PRN IV ITCHING;  Start 4/8/20 at 

08:00;  Stop 4/9/20 at 07:59;  Status DC


Info (PHARMACY MONITORING -- do not chart) 1 each PRN DAILY  PRN MC SEE 

COMMENTS;  Start 4/8/20 at 08:00;  Stop 4/9/20 at 08:16;  Status DC


Albumin Human 50 ml @ 50 mls/hr 1X  ONCE IV ;  Start 4/8/20 at 08:53;  Stop 

4/8/20 at 08:56;  Status DC


Albumin Human 200 ml @  50 mls/hr PRN 1X  PRN IV HYPOTENSION Last administered 

on 4/14/20at 11:54;  Start 4/8/20 at 09:00;  Stop 5/21/20 at 11:14;  Status DC


Meropenem 500 mg/ Sodium Chloride 50 ml @  100 mls/hr Q12H IV  Last administered

on 4/28/20at 10:45;  Start 4/8/20 at 10:00;  Stop 4/28/20 at 12:37;  Status DC


Sodium Chloride 90 meq/Magnesium Sulfate 12 meq/ Calcium Gluconate 15 meq/ 

Multivitamins 10 ml/Chromium/ Copper/Manganese/ Seleni/Zn 0.5 ml/ Insulin Human 

Regular 25 unit/ Total Parenteral Nutrition/Amino Acids/Dextrose/ Fat Emulsion 

Intravenous 1,400 ml @  58.333 mls/ hr TPN  CONT IV  Last administered on 

4/8/20at 21:41;  Start 4/8/20 at 22:00;  Stop 4/9/20 at 21:59;  Status DC


Sodium Chloride 1,000 ml @  1,000 mls/hr Q1H PRN IV hypotension;  Start 4/9/20 

at 07:58;  Stop 4/9/20 at 13:57;  Status DC


Albumin Human 200 ml @  200 mls/hr 1X PRN  PRN IV Hypotension Last administered 

on 4/9/20at 09:30;  Start 4/9/20 at 08:00;  Stop 4/9/20 at 13:59;  Status DC


Sodium Chloride 1,000 ml @  400 mls/hr Q2H30M PRN IV PATENCY;  Start 4/9/20 at 

07:58;  Stop 4/9/20 at 19:57;  Status DC


Info (PHARMACY MONITORING -- do not chart) 1 each PRN DAILY  PRN MC SEE 

COMMENTS;  Start 4/9/20 at 08:00;  Status Cancel


Info (PHARMACY MONITORING -- do not chart) 1 each PRN DAILY  PRN MC SEE 

COMMENTS;  Start 4/9/20 at 08:15;  Status UNV


Sodium Chloride 90 meq/Potassium Phosphate 5 mmol/ Magnesium Sulfate 12 

meq/Calcium Gluconate 15 meq/ Multivitamins 10 ml/Chromium/ Copper/Manganese/ 

Seleni/Zn 0.5 ml/ Insulin Human Regular 30 unit/ Total Parenteral 

Nutrition/Amino Acids/Dextrose/ Fat Emulsion Intravenous 1,400 ml @  58.333 mls/

hr TPN  CONT IV  Last administered on 4/9/20at 22:08;  Start 4/9/20 at 22:00;  

Stop 4/10/20 at 21:59;  Status DC


Linezolid/Dextrose 300 ml @  300 mls/hr Q12HR IV  Last administered on 4/20/20at

20:40;  Start 4/10/20 at 11:00;  Stop 4/21/20 at 08:10;  Status DC


Sodium Chloride 90 meq/Potassium Phosphate 15 mmol/ Magnesium Sulfate 12 

meq/Calcium Gluconate 15 meq/ Multivitamins 10 ml/Chromium/ Copper/Manganese/ 

Seleni/Zn 0.5 ml/ Insulin Human Regular 30 unit/ Total Parenteral 

Nutrition/Amino Acids/Dextrose/ Fat Emulsion Intravenous 1,400 ml @  58.333 mls/

hr TPN  CONT IV  Last administered on 4/10/20at 21:49;  Start 4/10/20 at 22:00; 

Stop 4/11/20 at 21:59;  Status DC


Sodium Chloride 90 meq/Potassium Phosphate 15 mmol/ Magnesium Sulfate 12 

meq/Calcium Gluconate 15 meq/ Multivitamins 10 ml/Chromium/ Copper/Manganese/ 

Seleni/Zn 0.5 ml/ Insulin Human Regular 40 unit/ Total Parenteral 

Nutrition/Amino Acids/Dextrose/ Fat Emulsion Intravenous 1,400 ml @  58.333 mls/

hr TPN  CONT IV  Last administered on 4/11/20at 21:21;  Start 4/11/20 at 22:00; 

Stop 4/12/20 at 21:59;  Status DC


Sodium Chloride 1,000 ml @  1,000 mls/hr Q1H PRN IV hypotension;  Start 4/11/20 

at 13:26;  Stop 4/11/20 at 19:25;  Status DC


Albumin Human 200 ml @  200 mls/hr 1X PRN  PRN IV Hypotension Last administered 

on 4/11/20at 15:00;  Start 4/11/20 at 13:30;  Stop 4/11/20 at 19:29;  Status DC


Sodium Chloride (Normal Saline Flush) 10 ml 1X PRN  PRN IV AP catheter pack;  

Start 4/11/20 at 13:30;  Stop 4/12/20 at 13:29;  Status DC


Sodium Chloride (Normal Saline Flush) 10 ml 1X PRN  PRN IV  catheter pack;  

Start 4/11/20 at 13:30;  Stop 4/12/20 at 13:29;  Status DC


Sodium Chloride 1,000 ml @  400 mls/hr Q2H30M PRN IV PATENCY;  Start 4/11/20 at 

13:26;  Stop 4/12/20 at 01:25;  Status DC


Info (PHARMACY MONITORING -- do not chart) 1 each PRN DAILY  PRN MC SEE 

COMMENTS;  Start 4/11/20 at 13:30;  Stop 4/11/20 at 13:33;  Status DC


Info (PHARMACY MONITORING -- do not chart) 1 each PRN DAILY  PRN MC SEE 

COMMENTS;  Start 4/11/20 at 13:30;  Stop 4/11/20 at 13:34;  Status DC


Sodium Chloride 90 meq/Potassium Phosphate 19 mmol/ Magnesium Sulfate 12 

meq/Calcium Gluconate 15 meq/ Multivitamins 10 ml/Chromium/ Copper/Manganese/ 

Seleni/Zn 0.5 ml/ Insulin Human Regular 40 unit/ Total Parenteral 

Nutrition/Amino Acids/Dextrose/ Fat Emulsion Intravenous 1,400 ml @  58.333 mls/

hr TPN  CONT IV  Last administered on 4/12/20at 21:54;  Start 4/12/20 at 22:00; 

Stop 4/13/20 at 21:59;  Status DC


Sodium Chloride 1,000 ml @  1,000 mls/hr Q1H PRN IV hypotension;  Start 4/13/20 

at 09:35;  Stop 4/13/20 at 15:34;  Status DC


Albumin Human 200 ml @  200 mls/hr 1X PRN  PRN IV Hypotension;  Start 4/13/20 at

09:45;  Stop 4/13/20 at 15:44;  Status DC


Diphenhydramine HCl (Benadryl) 25 mg 1X PRN  PRN IV ITCHING;  Start 4/13/20 at 

09:45;  Stop 4/14/20 at 09:44;  Status DC


Diphenhydramine HCl (Benadryl) 25 mg 1X PRN  PRN IV ITCHING;  Start 4/13/20 at 

09:45;  Stop 4/14/20 at 09:44;  Status DC


Sodium Chloride 1,000 ml @  400 mls/hr Q2H30M PRN IV PATENCY;  Start 4/13/20 at 

09:35;  Stop 4/13/20 at 21:34;  Status DC


Info (PHARMACY MONITORING -- do not chart) 1 each PRN DAILY  PRN MC SEE 

COMMENTS;  Start 4/13/20 at 09:45;  Status Cancel


Sodium Chloride 100 meq/Potassium Phosphate 19 mmol/ Magnesium Sulfate 12 me

q/Calcium Gluconate 15 meq/ Multivitamins 10 ml/Chromium/ Copper/Manganese/ 

Seleni/Zn 0.5 ml/ Insulin Human Regular 40 unit/ Potassium Chloride 20 meq/ 

Total Parenteral Nutrition/Amino Acids/Dextrose/ Fat Emulsion Intravenous 1,400 

ml @  58.333 mls/ hr TPN  CONT IV  Last administered on 4/13/20at 22:02;  Start 

4/13/20 at 22:00;  Stop 4/14/20 at 21:59;  Status DC


Furosemide (Lasix) 40 mg 1X  ONCE IVP  Last administered on 4/13/20at 14:39;  

Start 4/13/20 at 14:30;  Stop 4/13/20 at 14:31;  Status DC


Metronidazole 100 ml @  100 mls/hr Q8HRS IV  Last administered on 4/21/20at 

06:04;  Start 4/14/20 at 10:00;  Stop 4/21/20 at 08:10;  Status DC


Sodium Chloride 1,000 ml @  1,000 mls/hr Q1H PRN IV hypotension;  Start 4/14/20 

at 08:00;  Stop 4/14/20 at 13:59;  Status DC


Albumin Human 200 ml @  200 mls/hr 1X PRN  PRN IV Hypotension;  Start 4/14/20 at

08:00;  Stop 4/14/20 at 13:59;  Status DC


Sodium Chloride 1,000 ml @  400 mls/hr Q2H30M PRN IV PATENCY;  Start 4/14/20 at 

08:00;  Stop 4/14/20 at 19:59;  Status DC


Info (PHARMACY MONITORING -- do not chart) 1 each PRN DAILY  PRN MC SEE 

COMMENTS;  Start 4/14/20 at 11:30;  Status UNV


Info (PHARMACY MONITORING -- do not chart) 1 each PRN DAILY  PRN MC SEE 

COMMENTS;  Start 4/14/20 at 11:30;  Stop 4/16/20 at 12:13;  Status DC


Sodium Chloride 100 meq/Potassium Phosphate 19 mmol/ Magnesium Sulfate 12 

meq/Calcium Gluconate 15 meq/ Multivitamins 10 ml/Chromium/ Copper/Manganese/ 

Seleni/Zn 0.5 ml/ Insulin Human Regular 40 unit/ Potassium Chloride 20 meq/ 

Total Parenteral Nutrition/Amino Acids/Dextrose/ Fat Emulsion Intravenous 1,400 

ml @  58.333 mls/ hr TPN  CONT IV  Last administered on 4/14/20at 21:52;  Start 

4/14/20 at 22:00;  Stop 4/15/20 at 21:59;  Status DC


Sodium Chloride (Normal Saline Flush) 10 ml QSHIFT  PRN IV AFTER MEDS AND BLOOD 

DRAWS;  Start 4/14/20 at 15:00;  Stop 5/12/20 at 11:27;  Status DC


Sodium Chloride (Normal Saline Flush) 10 ml PRN Q5MIN  PRN IV AFTER MEDS AND 

BLOOD DRAWS;  Start 4/14/20 at 15:00


Sodium Chloride (Normal Saline Flush) 20 ml PRN Q5MIN  PRN IV AFTER MEDS AND 

BLOOD DRAWS;  Start 4/14/20 at 15:00


Sodium Chloride 100 meq/Potassium Phosphate 19 mmol/ Magnesium Sulfate 12 

meq/Calcium Gluconate 15 meq/ Multivitamins 10 ml/Chromium/ Copper/Manganese/ 

Seleni/Zn 0.5 ml/ Insulin Human Regular 40 unit/ Potassium Chloride 20 meq/ 

Total Parenteral Nutrition/Amino Acids/Dextrose/ Fat Emulsion Intravenous 1,400 

ml @  58.333 mls/ hr TPN  CONT IV  Last administered on 4/15/20at 21:20;  Start 

4/15/20 at 22:00;  Stop 4/16/20 at 21:59;  Status DC


Lidocaine HCl (Buffered Lidocaine 1%) 3 ml STK-MED ONCE .ROUTE ;  Start 4/15/20 

at 13:16;  Stop 4/15/20 at 13:16;  Status DC


Lidocaine HCl (Buffered Lidocaine 1%) 6 ml 1X  ONCE INJ  Last administered on 

4/15/20at 13:45;  Start 4/15/20 at 13:30;  Stop 4/15/20 at 13:31;  Status DC


Albumin Human 100 ml @  100 mls/hr 1X  ONCE IV  Last administered on 4/15/20at 

15:41;  Start 4/15/20 at 15:00;  Stop 4/15/20 at 15:59;  Status DC


Albumin Human 50 ml @ 50 mls/hr 1X  ONCE IV  Last administered on 4/15/20at 

15:00;  Start 4/15/20 at 15:00;  Stop 4/15/20 at 15:59;  Status DC


Info (PHARMACY MONITORING -- do not chart) 1 each PRN DAILY  PRN MC SEE 

COMMENTS;  Start 4/16/20 at 11:30;  Status Cancel


Info (PHARMACY MONITORING -- do not chart) 1 each PRN DAILY  PRN MC SEE 

COMMENTS;  Start 4/16/20 at 11:30;  Status UNV


Sodium Chloride 100 meq/Potassium Phosphate 10 mmol/ Magnesium Sulfate 12 

meq/Calcium Gluconate 15 meq/ Multivitamins 10 ml/Chromium/ Copper/Manganese/ 

Seleni/Zn 0.5 ml/ Insulin Human Regular 35 unit/ Potassium Chloride 20 meq/ 

Total Parenteral Nutrition/Amino Acids/Dextrose/ Fat Emulsion Intravenous 1,400 

ml @  58.333 mls/ hr TPN  CONT IV  Last administered on 4/16/20at 22:10;  Start 

4/16/20 at 22:00;  Stop 4/17/20 at 21:59;  Status DC


Sodium Chloride 100 meq/Potassium Phosphate 5 mmol/ Magnesium Sulfate 12 

meq/Calcium Gluconate 15 meq/ Multivitamins 10 ml/Chromium/ Copper/Manganese/ 

Seleni/Zn 0.5 ml/ Insulin Human Regular 35 unit/ Potassium Chloride 20 meq/ 

Total Parenteral Nutrition/Amino Acids/Dextrose/ Fat Emulsion Intravenous 1,400 

ml @  58.333 mls/ hr TPN  CONT IV  Last administered on 4/17/20at 22:59;  Start 

4/17/20 at 22:00;  Stop 4/18/20 at 21:59;  Status DC


Sodium Chloride 1,000 ml @  1,000 mls/hr Q1H PRN IV hypotension;  Start 4/18/20 

at 08:27;  Stop 4/18/20 at 14:26;  Status DC


Albumin Human 200 ml @  200 mls/hr 1X PRN  PRN IV Hypotension Last administered 

on 4/18/20at 09:18;  Start 4/18/20 at 08:30;  Stop 4/18/20 at 14:29;  Status DC


Sodium Chloride 1,000 ml @  400 mls/hr Q2H30M PRN IV PATENCY;  Start 4/18/20 at 

08:27;  Stop 4/18/20 at 20:26;  Status DC


Info (PHARMACY MONITORING -- do not chart) 1 each PRN DAILY  PRN MC SEE 

COMMENTS;  Start 4/18/20 at 08:30;  Status Cancel


Info (PHARMACY MONITORING -- do not chart) 1 each PRN DAILY  PRN MC SEE 

COMMENTS;  Start 4/18/20 at 08:30;  Stop 4/26/20 at 13:10;  Status DC


Sodium Chloride 100 meq/Potassium Chloride 40 meq/ Magnesium Sulfate 15 

meq/Calcium Gluconate 15 meq/ Multivitamins 10 ml/Chromium/ Copper/Manganese/ 

Seleni/Zn 0.5 ml/ Insulin Human Regular 35 unit/ Total Parenteral Nutrition

/Amino Acids/Dextrose/ Fat Emulsion Intravenous 1,400 ml @  58.333 mls/ hr TPN  

CONT IV  Last administered on 4/18/20at 22:00;  Start 4/18/20 at 22:00;  Stop 

4/19/20 at 21:59;  Status DC


Potassium Chloride/Water 100 ml @  100 mls/hr 1X  ONCE IV  Last administered on 

4/18/20at 17:28;  Start 4/18/20 at 14:45;  Stop 4/18/20 at 15:44;  Status DC


Sodium Chloride 100 meq/Potassium Chloride 40 meq/ Magnesium Sulfate 15 

meq/Calcium Gluconate 15 meq/ Multivitamins 10 ml/Chromium/ Copper/Manganese/ 

Seleni/Zn 0.5 ml/ Insulin Human Regular 35 unit/ Total Parenteral 

Nutrition/Amino Acids/Dextrose/ Fat Emulsion Intravenous 1,400 ml @  58.333 mls/

hr TPN  CONT IV  Last administered on 4/19/20at 22:46;  Start 4/19/20 at 22:00; 

Stop 4/20/20 at 21:59;  Status DC


Sodium Chloride 100 meq/Potassium Chloride 40 meq/ Magnesium Sulfate 20 

meq/Calcium Gluconate 15 meq/ Multivitamins 10 ml/Chromium/ Copper/Manganese/ 

Seleni/Zn 0.5 ml/ Insulin Human Regular 35 unit/ Total Parenteral 

Nutrition/Amino Acids/Dextrose/ Fat Emulsion Intravenous 1,400 ml @  58.333 mls/

hr TPN  CONT IV  Last administered on 4/20/20at 22:31;  Start 4/20/20 at 22:00; 

Stop 4/21/20 at 21:59;  Status DC


Fentanyl Citrate (Fentanyl 2ml Vial) 50 mcg PRN Q2HR  PRN IVP PAIN Last 

administered on 4/27/20at 13:32;  Start 4/20/20 at 21:00;  Stop 4/28/20 at 

12:53;  Status DC


Fentanyl Citrate (Fentanyl 2ml Vial) 25 mcg PRN Q2HR  PRN IVP PAIN;  Start 

4/20/20 at 21:00;  Stop 4/28/20 at 12:54;  Status DC


Enoxaparin Sodium (Lovenox 100mg Syringe) 100 mg Q12HR SQ ;  Start 4/21/20 at 

21:00;  Status UNV


Amino Acids/ Glycerin/ Electrolytes 1,000 ml @  75 mls/hr [id number] IV ;  Start 

4/20/20 at 21:15;  Status UNV


Sodium Chloride 1,000 ml @  1,000 mls/hr Q1H PRN IV hypotension;  Start 4/21/20 

at 07:56;  Stop 4/21/20 at 13:55;  Status DC


Albumin Human 200 ml @  200 mls/hr 1X PRN  PRN IV Hypotension Last administered 

on 4/21/20at 08:40;  Start 4/21/20 at 08:00;  Stop 4/21/20 at 13:59;  Status DC


Sodium Chloride 1,000 ml @  400 mls/hr Q2H30M PRN IV PATENCY;  Start 4/21/20 at 

07:56;  Stop 4/21/20 at 19:55;  Status DC


Info (PHARMACY MONITORING -- do not chart) 1 each PRN DAILY  PRN MC SEE 

COMMENTS;  Start 4/21/20 at 08:00;  Status UNV


Info (PHARMACY MONITORING -- do not chart) 1 each PRN DAILY  PRN MC SEE 

COMMENTS;  Start 4/21/20 at 08:00;  Status UNV


Daptomycin 430 mg/ Sodium Chloride 50 ml @  100 mls/hr Q24H IV  Last 

administered on 4/21/20at 12:35;  Start 4/21/20 at 09:00;  Stop 4/21/20 at 

12:49;  Status DC


Sodium Chloride 100 meq/Potassium Chloride 40 meq/ Magnesium Sulfate 20 

meq/Calcium Gluconate 15 meq/ Multivitamins 10 ml/Chromium/ Copper/Manganese/ 

Seleni/Zn 0.5 ml/ Insulin Human Regular 35 unit/ Total Parenteral 

Nutrition/Amino Acids/Dextrose/ Fat Emulsion Intravenous 1,400 ml @  58.333 mls/

hr TPN  CONT IV  Last administered on 4/21/20at 21:26;  Start 4/21/20 at 22:00; 

Stop 4/22/20 at 21:59;  Status DC


Daptomycin 430 mg/ Sodium Chloride 50 ml @  100 mls/hr Q48H IV ;  Start 4/23/20 

at 09:00;  Stop 4/22/20 at 11:55;  Status DC


Sodium Chloride 100 meq/Potassium Chloride 40 meq/ Magnesium Sulfate 20 

meq/Calcium Gluconate 15 meq/ Multivitamins 10 ml/Chromium/ Copper/Manganese/ 

Seleni/Zn 0.5 ml/ Insulin Human Regular 35 unit/ Total Parenteral 

Nutrition/Amino Acids/Dextrose/ Fat Emulsion Intravenous 1,400 ml @  58.333 mls/

hr TPN  CONT IV  Last administered on 4/22/20at 22:27;  Start 4/22/20 at 22:00; 

Stop 4/23/20 at 21:59;  Status DC


Daptomycin 430 mg/ Sodium Chloride 50 ml @  100 mls/hr Q24H IV  Last 

administered on 4/24/20at 15:07;  Start 4/22/20 at 13:00;  Stop 4/25/20 at 

13:15;  Status DC


Sodium Chloride 100 meq/Potassium Chloride 40 meq/ Magnesium Sulfate 20 

meq/Calcium Gluconate 10 meq/ Multivitamins 10 ml/Chromium/ Copper/Manganese/ 

Seleni/Zn 0.5 ml/ Insulin Human Regular 35 unit/ Total Parenteral 

Nutrition/Amino Acids/Dextrose/ Fat Emulsion Intravenous 1,400 ml @  58.333 mls/

hr TPN  CONT IV  Last administered on 4/24/20at 00:06;  Start 4/23/20 at 22:00; 

Stop 4/24/20 at 21:59;  Status DC


Alteplase, Recombinant (Cathflo For Central Catheter Clearance) 1 mg 1X  ONCE 

INT CAT  Last administered on 4/24/20at 11:44;  Start 4/24/20 at 10:45;  Stop 

4/24/20 at 10:46;  Status DC


Ondansetron HCl (Zofran) 4 mg PRN Q6HRS  PRN IV NAUSEA/VOMITING;  Start 4/27/20 

at 07:00;  Stop 4/28/20 at 06:59;  Status DC


Fentanyl Citrate (Fentanyl 2ml Vial) 25 mcg PRN Q5MIN  PRN IV MILD PAIN 1-3;  

Start 4/27/20 at 07:00;  Stop 4/28/20 at 06:59;  Status DC


Fentanyl Citrate (Fentanyl 2ml Vial) 50 mcg PRN Q5MIN  PRN IV MODERATE TO SEVERE

PAIN Last administered on 4/27/20at 10:17;  Start 4/27/20 at 07:00;  Stop 

4/28/20 at 06:59;  Status DC


Ringer's Solution 1,000 ml @  30 mls/hr Q24H IV ;  Start 4/27/20 at 07:00;  Stop

4/27/20 at 18:59;  Status DC


Lidocaine HCl (Xylocaine-Mpf 1% 2ml Vial) 2 ml PRN 1X  PRN ID PRIOR TO IV START;

 Start 4/27/20 at 07:00;  Stop 4/28/20 at 06:59;  Status DC


Prochlorperazine Edisylate (Compazine) 5 mg PACU PRN  PRN IV NAUSEA, MRX1;  

Start 4/27/20 at 07:00;  Stop 4/28/20 at 06:59;  Status DC


Sodium Acetate 50 meq/Potassium Acetate 55 meq/ Magnesium Sulfate 20 meq/Calcium

Gluconate 10 meq/ Multivitamins 10 ml/Chromium/ Copper/Manganese/ Seleni/Zn 0.5 

ml/ Insulin Human Regular 35 unit/ Total Parenteral Nutrition/Amino Acids/

Dextrose/ Fat Emulsion Intravenous 1,400 ml @  58.333 mls/ hr TPN  CONT IV ;  

Start 4/24/20 at 22:00;  Stop 4/24/20 at 14:15;  Status DC


Sodium Acetate 50 meq/Potassium Acetate 55 meq/ Magnesium Sulfate 20 meq/Calcium

Gluconate 10 meq/ Multivitamins 10 ml/Chromium/ Copper/Manganese/ Seleni/Zn 0.5 

ml/ Insulin Human Regular 35 unit/ Total Parenteral Nutrition/Amino 

Acids/Dextrose/ Fat Emulsion Intravenous 1,800 ml @  75 mls/hr TPN  CONT IV  

Last administered on 4/24/20at 22:38;  Start 4/24/20 at 22:00;  Stop 4/25/20 at 

21:59;  Status DC


Sodium Chloride 1,000 ml @  1,000 mls/hr Q1H PRN IV hypotension;  Start 4/24/20 

at 15:31;  Stop 4/24/20 at 21:30;  Status DC


Diphenhydramine HCl (Benadryl) 25 mg 1X PRN  PRN IV ITCHING;  Start 4/24/20 at 

15:45;  Stop 4/25/20 at 15:44;  Status DC


Diphenhydramine HCl (Benadryl) 25 mg 1X PRN  PRN IV ITCHING;  Start 4/24/20 at 

15:45;  Stop 4/25/20 at 15:44;  Status DC


Sodium Chloride 1,000 ml @  400 mls/hr Q2H30M PRN IV PATENCY;  Start 4/24/20 at 

15:31;  Stop 4/25/20 at 03:30;  Status DC


Info (PHARMACY MONITORING -- do not chart) 1 each PRN DAILY  PRN MC SEE 

COMMENTS;  Start 4/24/20 at 15:45;  Stop 5/26/20 at 14:14;  Status DC


Sodium Acetate 50 meq/Potassium Acetate 55 meq/ Magnesium Sulfate 20 meq/Calcium

Gluconate 10 meq/ Multivitamins 10 ml/Chromium/ Copper/Manganese/ Seleni/Zn 0.5 

ml/ Insulin Human Regular 35 unit/ Total Parenteral Nutrition/Amino Acids/D

extrose/ Fat Emulsion Intravenous 1,800 ml @  75 mls/hr TPN  CONT IV  Last 

administered on 4/25/20at 22:03;  Start 4/25/20 at 22:00;  Stop 4/26/20 at 

21:59;  Status DC


Daptomycin 430 mg/ Sodium Chloride 50 ml @  100 mls/hr Q24H IV  Last 

administered on 4/30/20at 13:00;  Start 4/25/20 at 13:00;  Stop 4/30/20 at 

20:58;  Status DC


Heparin Sodium (Porcine) 1000 unit/Sodium Chloride 1,001 ml @  1,001 mls/hr 1X  

ONCE IRR ;  Start 4/27/20 at 06:00;  Stop 4/27/20 at 06:59;  Status DC


Potassium Acetate 55 meq/Magnesium Sulfate 20 meq/ Calcium Gluconate 10 meq/ 

Multivitamins 10 ml/Chromium/ Copper/Manganese/ Seleni/Zn 0.5 ml/ Insulin Human 

Regular 35 unit/ Total Parenteral Nutrition/Amino Acids/Dextrose/ Fat Emulsion 

Intravenous 1,920 ml @  80 mls/hr TPN  CONT IV  Last administered on 4/26/20at 

22:10;  Start 4/26/20 at 22:00;  Stop 4/27/20 at 21:59;  Status DC


Dexamethasone Sodium Phosphate (Decadron) 4 mg STK-MED ONCE .ROUTE ;  Start 

4/27/20 at 10:56;  Stop 4/27/20 at 10:57;  Status DC


Ondansetron HCl (Zofran) 4 mg STK-MED ONCE .ROUTE ;  Start 4/27/20 at 10:56;  

Stop 4/27/20 at 10:57;  Status DC


Rocuronium Bromide (Zemuron) 50 mg STK-MED ONCE .ROUTE ;  Start 4/27/20 at 

10:56;  Stop 4/27/20 at 10:57;  Status DC


Fentanyl Citrate (Fentanyl 2ml Vial) 100 mcg STK-MED ONCE .ROUTE ;  Start 

4/27/20 at 10:56;  Stop 4/27/20 at 10:57;  Status DC


Bupivacaine HCl/ Epinephrine Bitart (Sensorcain-Epi 0.5%-1:[id number] Mpf) 30 ml 

STK-MED ONCE .ROUTE  Last administered on 4/27/20at 12:01;  Start 4/27/20 at 

10:58;  Stop 4/27/20 at 10:58;  Status DC


Cellulose (Surgicel Hemostat 2x14) 1 each STK-MED ONCE .ROUTE ;  Start 4/27/20 

at 10:58;  Stop 4/27/20 at 10:59;  Status DC


Iohexol (Omnipaque 300 Mg/ml) 50 ml STK-MED ONCE .ROUTE ;  Start 4/27/20 at 

10:58;  Stop 4/27/20 at 10:59;  Status DC


Cellulose (Surgicel Hemostat 4x8) 1 each STK-MED ONCE .ROUTE ;  Start 4/27/20 at

10:58;  Stop 4/27/20 at 10:59;  Status DC


Bisacodyl (Dulcolax Supp) 10 mg STK-MED ONCE .ROUTE ;  Start 4/27/20 at 10:59;  

Stop 4/27/20 at 10:59;  Status DC


Heparin Sodium (Porcine) 1000 unit/Sodium Chloride 1,001 ml @  1,001 mls/hr 1X  

ONCE IRR ;  Start 4/27/20 at 12:00;  Stop 4/27/20 at 12:59;  Status DC


Propofol 20 ml @ As Directed STK-MED ONCE IV ;  Start 4/27/20 at 11:05;  Stop 

4/27/20 at 11:05;  Status DC


Sevoflurane (Ultane) 90 ml STK-MED ONCE IH ;  Start 4/27/20 at 11:05;  Stop 

4/27/20 at 11:05;  Status DC


Sevoflurane (Ultane) 60 ml STK-MED ONCE IH ;  Start 4/27/20 at 12:26;  Stop 

4/27/20 at 12:27;  Status DC


Propofol 20 ml @ As Directed STK-MED ONCE IV ;  Start 4/27/20 at 12:26;  Stop 

4/27/20 at 12:27;  Status DC


Phenylephrine HCl (PHENYLEPHRINE in 0.9% NACL PF) 1 mg STK-MED ONCE IV ;  Start 

4/27/20 at 12:34;  Stop 4/27/20 at 12:34;  Status DC


Heparin Sodium (Porcine) (Heparin Sodium) 5,000 unit Q12HR SQ  Last administered

on 5/6/20at 20:57;  Start 4/27/20 at 21:00;  Stop 5/7/20 at 09:59;  Status DC


Sodium Chloride (Normal Saline Flush) 3 ml QSHIFT  PRN IV AFTER MEDS AND BLOOD 

DRAWS;  Start 4/27/20 at 13:45;  Status Cancel


Naloxone HCl (Narcan) 0.4 mg PRN Q2MIN  PRN IV SEE INSTRUCTIONS Last 

administered on 6/6/20at 15:15;  Start 4/27/20 at 13:45;  Stop 7/1/20 at 16:00; 

Status DC


Sodium Chloride 1,000 ml @  25 mls/hr Q24H IV  Last administered on 5/26/20at 

13:37;  Start 4/27/20 at 13:37;  Stop 5/29/20 at 13:09;  Status DC


Naloxone HCl (Narcan) 0.4 mg PRN Q2MIN  PRN IV SEE INSTRUCTIONS;  Start 4/27/20 

at 14:30;  Status UNV


Sodium Chloride 1,000 ml @  25 mls/hr Q24H IV ;  Start 4/27/20 at 14:30;  Status

UNV


Hydromorphone HCl 30 ml @ 0 mls/hr CONT PRN  PRN IV PER PROTOCOL Last admini

stered on 5/2/20at 16:08;  Start 4/27/20 at 14:30;  Stop 5/4/20 at 08:55;  

Status DC


Potassium Acetate 55 meq/Magnesium Sulfate 20 meq/ Calcium Gluconate 10 meq/ 

Multivitamins 10 ml/Chromium/ Copper/Manganese/ Seleni/Zn 0.5 ml/ Insulin Human 

Regular 35 unit/ Total Parenteral Nutrition/Amino Acids/Dextrose/ Fat Emulsion 

Intravenous 1,920 ml @  80 mls/hr TPN  CONT IV  Last administered on 4/27/20at 

22:01;  Start 4/27/20 at 22:00;  Stop 4/28/20 at 21:59;  Status DC


Bumetanide (Bumex) 2 mg [id number] IV  Last administered on 5/1/20at 13:50;  Start 

4/28/20 at 14:00;  Stop 5/2/20 at 14:10;  Status DC


Meropenem 1 gm/ Sodium Chloride 100 ml @  200 mls/hr Q8HRS IV  Last administered

on 5/22/20at 05:53;  Start 4/28/20 at 14:00;  Stop 5/22/20 at 09:31;  Status DC


Potassium Acetate 55 meq/Magnesium Sulfate 20 meq/ Calcium Gluconate 10 meq/ 

Multivitamins 10 ml/Chromium/ Copper/Manganese/ Seleni/Zn 0.5 ml/ Insulin Human 

Regular 35 unit/ Total Parenteral Nutrition/Amino Acids/Dextrose/ Fat Emulsion 

Intravenous 1,920 ml @  80 mls/hr TPN  CONT IV  Last administered on 4/28/20at 

22:02;  Start 4/28/20 at 22:00;  Stop 4/29/20 at 21:59;  Status DC


Hydromorphone HCl (Dilaudid Standard PCA) 12 mg STK-MED ONCE IV ;  Start 4/27/20

at 14:35;  Stop 4/28/20 at 13:53;  Status DC


Artificial Tears (Artificial Tears) 1 drop PRN Q15MIN  PRN OU DRY EYE Last 

administered on 6/23/20at 21:17;  Start 4/29/20 at 05:30


Hydromorphone HCl (Dilaudid Standard PCA) 12 mg STK-MED ONCE IV ;  Start 4/28/20

at 12:05;  Stop 4/29/20 at 09:15;  Status DC


Potassium Acetate 65 meq/Magnesium Sulfate 20 meq/ Calcium Gluconate 10 meq/ 

Multivitamins 10 ml/Chromium/ Copper/Manganese/ Seleni/Zn 0.5 ml/ Insulin Human 

Regular 30 unit/ Total Parenteral Nutrition/Amino Acids/Dextrose/ Fat Emulsion 

Intravenous 1,920 ml @  80 mls/hr TPN  CONT IV  Last administered on 4/29/20at 

22:22;  Start 4/29/20 at 22:00;  Stop 4/30/20 at 21:59;  Status DC


Cyclobenzaprine HCl (Flexeril) 10 mg PRN Q6HRS  PRN PO MUSCLE SPASMS Last 

administered on 7/10/20at 19:12;  Start 4/30/20 at 10:45


Potassium Acetate 55 meq/Magnesium Sulfate 20 meq/ Calcium Gluconate 10 meq/ 

Multivitamins 10 ml/Chromium/ Copper/Manganese/ Seleni/Zn 0.5 ml/ Insulin Human 

Regular 30 unit/ Total Parenteral Nutrition/Amino Acids/Dextrose/ Fat Emulsion 

Intravenous 1,920 ml @  80 mls/hr TPN  CONT IV  Last administered on 5/1/20at 

01:00;  Start 4/30/20 at 22:00;  Stop 5/1/20 at 21:59;  Status DC


Magnesium Sulfate 50 ml @ 25 mls/hr 1X  ONCE IV  Last administered on 4/30/20at 

17:18;  Start 4/30/20 at 12:45;  Stop 4/30/20 at 14:44;  Status DC


Potassium Chloride/Water 100 ml @  100 mls/hr 1X  ONCE IV  Last administered on 

5/1/20at 11:27;  Start 5/1/20 at 12:00;  Stop 5/1/20 at 12:59;  Status DC


Hydromorphone HCl (Dilaudid Standard PCA) 12 mg STK-MED ONCE IV ;  Start 4/29/20

at 10:50;  Stop 5/1/20 at 11:02;  Status DC


Hydromorphone HCl (Dilaudid Standard PCA) 12 mg STK-MED ONCE IV ;  Start 4/30/20

at 13:47;  Stop 5/1/20 at 11:03;  Status DC


Potassium Acetate 30 meq/Magnesium Sulfate 20 meq/ Calcium Gluconate 10 meq/ 

Multivitamins 10 ml/Chromium/ Copper/Manganese/ Seleni/Zn 0.5 ml/ Insulin Human 

Regular 30 unit/ Potassium Chloride 30 meq/ Total Parenteral Nutrition/Amino 

Acids/Dextrose/ Fat Emulsion Intravenous 1,920 ml @  80 mls/hr TPN  CONT IV  

Last administered on 5/1/20at 22:34;  Start 5/1/20 at 22:00;  Stop 5/2/20 at 

21:59;  Status DC


Potassium Chloride/Water 100 ml @  100 mls/hr Q1H IV  Last administered on 

5/2/20at 13:05;  Start 5/2/20 at 07:00;  Stop 5/2/20 at 10:59;  Status DC


Magnesium Sulfate 50 ml @ 25 mls/hr 1X  ONCE IV  Last administered on 5/2/20at 

10:34;  Start 5/2/20 at 10:30;  Stop 5/2/20 at 12:29;  Status DC


Potassium Chloride 75 meq/ Magnesium Sulfate 20 meq/Calcium Gluconate 10 meq/ 

Multivitamins 10 ml/Chromium/ Copper/Manganese/ Seleni/Zn 0.5 ml/ Insulin Human 

Regular 30 unit/ Total Parenteral Nutrition/Amino Acids/Dextrose/ Fat Emulsion 

Intravenous 1,920 ml @  80 mls/hr TPN  CONT IV  Last administered on 5/2/20at 

21:51;  Start 5/2/20 at 22:00;  Stop 5/3/20 at 22:00;  Status DC


Potassium Chloride 75 meq/ Magnesium Sulfate 20 meq/Calcium Gluconate 10 meq/ 

Multivitamins 10 ml/Chromium/ Copper/Manganese/ Seleni/Zn 0.5 ml/ Insulin Human 

Regular 25 unit/ Total Parenteral Nutrition/Amino Acids/Dextrose/ Fat Emulsion 

Intravenous 1,920 ml @  80 mls/hr TPN  CONT IV  Last administered on 5/3/20at 

22:04;  Start 5/3/20 at 22:00;  Stop 5/4/20 at 21:59;  Status DC


Hydromorphone HCl (Dilaudid) 0.4 mg PRN Q4HRS  PRN IVP PAIN Last administered on

5/4/20at 10:57;  Start 5/4/20 at 09:00;  Stop 5/4/20 at 18:59;  Status DC


Micafungin Sodium 100 mg/Dextrose 100 ml @  100 mls/hr Q24H IV  Last 

administered on 5/26/20at 12:17;  Start 5/4/20 at 11:00;  Stop 5/27/20 at 09:59;

 Status DC


Daptomycin 485 mg/ Sodium Chloride 50 ml @  100 mls/hr Q24H IV  Last 

administered on 5/11/20at 13:10;  Start 5/4/20 at 11:00;  Stop 5/12/20 at 07:44;

 Status DC


Potassium Chloride 75 meq/ Magnesium Sulfate 15 meq/Calcium Gluconate 8 meq/ 

Multivitamins 10 ml/Chromium/ Copper/Manganese/ Seleni/Zn 0.5 ml/ Insulin Human 

Regular 25 unit/ Total Parenteral Nutrition/Amino Acids/Dextrose/ Fat Emulsion 

Intravenous 1,920 ml @  80 mls/hr TPN  CONT IV  Last administered on 5/4/20at 

23:08;  Start 5/4/20 at 22:00;  Stop 5/5/20 at 21:59;  Status DC


Haloperidol Lactate (Haldol Inj) 3 mg 1X  ONCE IVP  Last administered on 

5/4/20at 14:37;  Start 5/4/20 at 14:30;  Stop 5/4/20 at 14:31;  Status DC


Hydromorphone HCl (Dilaudid) 1 mg PRN Q4HRS  PRN IVP PAIN Last administered on 

5/18/20at 06:25;  Start 5/4/20 at 19:00;  Stop 5/18/20 at 17:10;  Status DC


Potassium Chloride 75 meq/ Magnesium Sulfate 15 meq/Calcium Gluconate 8 meq/ 

Multivitamins 10 ml/Chromium/ Copper/Manganese/ Seleni/Zn 0.5 ml/ Insulin Human 

Regular 20 unit/ Total Parenteral Nutrition/Amino Acids/Dextrose/ Fat Emulsion 

Intravenous 1,920 ml @  80 mls/hr TPN  CONT IV  Last administered on 5/5/20at 

22:10;  Start 5/5/20 at 22:00;  Stop 5/6/20 at 21:59;  Status DC


Lidocaine HCl (Buffered Lidocaine 1%) 3 ml STK-MED ONCE .ROUTE ;  Start 5/6/20 

at 11:31;  Stop 5/6/20 at 11:31;  Status DC


Lidocaine HCl (Buffered Lidocaine 1%) 3 ml STK-MED ONCE .ROUTE ;  Start 5/6/20 

at 12:28;  Stop 5/6/20 at 12:29;  Status DC


Lidocaine HCl (Buffered Lidocaine 1%) 6 ml 1X  ONCE INJ  Last administered on 

5/6/20at 12:53;  Start 5/6/20 at 12:45;  Stop 5/6/20 at 12:46;  Status DC


Potassium Chloride 75 meq/ Magnesium Sulfate 15 meq/Calcium Gluconate 8 meq/ 

Multivitamins 10 ml/Chromium/ Copper/Manganese/ Seleni/Zn 0.5 ml/ Insulin Human 

Regular 20 unit/ Total Parenteral Nutrition/Amino Acids/Dextrose/ Fat Emulsion 

Intravenous 1,920 ml @  80 mls/hr TPN  CONT IV  Last administered on 5/6/20at 

22:00;  Start 5/6/20 at 22:00;  Stop 5/7/20 at 21:59;  Status DC


Potassium Chloride 75 meq/ Magnesium Sulfate 15 meq/Calcium Gluconate 8 meq/ 

Multivitamins 10 ml/Chromium/ Copper/Manganese/ Seleni/Zn 0.5 ml/ Insulin Human 

Regular 15 unit/ Total Parenteral Nutrition/Amino Acids/Dextrose/ Fat Emulsion 

Intravenous 1,920 ml @  80 mls/hr TPN  CONT IV  Last administered on 5/7/20at 

22:28;  Start 5/7/20 at 22:00;  Stop 5/8/20 at 21:59;  Status DC


Vecuronium Bromide (Norcuron Bolus) 6 mg PRN Q6HRS  PRN IV VENT ASYNCHRONY;  

Start 5/7/20 at 19:15;  Stop 5/7/20 at 19:35;  Status DC


Bumetanide (Bumex) 2 mg 1X  ONCE IV  Last administered on 5/7/20at 22:09;  Start

5/7/20 at 19:45;  Stop 5/7/20 at 19:46;  Status DC


Lidocaine HCl (Buffered Lidocaine 1%) 3 ml STK-MED ONCE .ROUTE ;  Start 5/8/20 

at 07:59;  Stop 5/8/20 at 07:59;  Status DC


Midazolam HCl (Versed) 5 mg STK-MED ONCE .ROUTE ;  Start 5/8/20 at 08:36;  Stop 

5/8/20 at 08:36;  Status DC


Fentanyl Citrate (Fentanyl 5ml Vial) 250 mcg STK-MED ONCE .ROUTE ;  Start 5/8/20

at 08:36;  Stop 5/8/20 at 08:37;  Status DC


Lidocaine HCl (Buffered Lidocaine 1%) 3 ml 1X  ONCE IJ  Last administered on 

5/8/20at 09:30;  Start 5/8/20 at 09:15;  Stop 5/8/20 at 09:16;  Status DC


Midazolam HCl (Versed) 5 mg 1X  ONCE IV  Last administered on 5/8/20at 09:30;  

Start 5/8/20 at 09:15;  Stop 5/8/20 at 09:16;  Status DC


Fentanyl Citrate (Fentanyl 5ml Vial) 250 mcg 1X  ONCE IV  Last administered on 

5/8/20at 09:30;  Start 5/8/20 at 09:15;  Stop 5/8/20 at 09:16;  Status DC


Bumetanide (Bumex) 2 mg DAILY IV  Last administered on 5/18/20at 08:07;  Start 

5/8/20 at 10:00;  Stop 5/18/20 at 17:15;  Status DC


Potassium Chloride 75 meq/ Magnesium Sulfate 15 meq/ Multivitamins 10 

ml/Chromium/ Copper/Manganese/ Seleni/Zn 0.5 ml/ Insulin Human Regular 15 unit/ 

Total Parenteral Nutrition/Amino Acids/Dextrose/ Fat Emulsion Intravenous 1,920 

ml @  80 mls/hr TPN  CONT IV  Last administered on 5/8/20at 21:59;  Start 5/8/20

at 22:00;  Stop 5/9/20 at 21:59;  Status DC


Metoclopramide HCl (Reglan Vial) 10 mg PRN Q3HRS  PRN IVP NAUSEA/VOMITING-3rd 

choice Last administered on 5/14/20at 04:25;  Start 5/9/20 at 16:45


Potassium Chloride 75 meq/ Magnesium Sulfate 15 meq/ Multivitamins 10 

ml/Chromium/ Copper/Manganese/ Seleni/Zn 0.5 ml/ Insulin Human Regular 15 unit/ 

Total Parenteral Nutrition/Amino Acids/Dextrose/ Fat Emulsion Intravenous 1,920 

ml @  80 mls/hr TPN  CONT IV  Last administered on 5/9/20at 22:41;  Start 5/9/20

at 22:00;  Stop 5/10/20 at 21:59;  Status DC


Magnesium Sulfate 50 ml @ 25 mls/hr 1X  ONCE IV  Last administered on 5/10/20at 

10:44;  Start 5/10/20 at 09:00;  Stop 5/10/20 at 10:59;  Status DC


Potassium Chloride/Water 100 ml @  100 mls/hr 1X  ONCE IV  Last administered on 

5/10/20at 09:37;  Start 5/10/20 at 09:00;  Stop 5/10/20 at 09:59;  Status DC


Duloxetine HCl (Cymbalta) 30 mg DAILY PO  Last administered on 5/11/20at 09:48; 

Start 5/10/20 at 14:00;  Stop 5/13/20 at 10:25;  Status DC


Potassium Chloride 80 meq/ Magnesium Sulfate 20 meq/ Multivitamins 10 

ml/Chromium/ Copper/Manganese/ Seleni/Zn 0.5 ml/ Insulin Human Regular 15 unit/ 

Total Parenteral Nutrition/Amino Acids/Dextrose/ Fat Emulsion Intravenous 1,920 

ml @  80 mls/hr TPN  CONT IV  Last administered on 5/10/20at 21:42;  Start 

5/10/20 at 22:00;  Stop 5/11/20 at 21:59;  Status DC


Potassium Chloride 80 meq/ Magnesium Sulfate 20 meq/ Multivitamins 10 

ml/Chromium/ Copper/Manganese/ Seleni/Zn 0.5 ml/ Insulin Human Regular 15 unit/ 

Total Parenteral Nutrition/Amino Acids/Dextrose/ Fat Emulsion Intravenous 1,920 

ml @  80 mls/hr TPN  CONT IV  Last administered on 5/11/20at 22:20;  Start 

5/11/20 at 22:00;  Stop 5/12/20 at 21:59;  Status DC


Lidocaine HCl (Buffered Lidocaine 1%) 3 ml STK-MED ONCE .ROUTE ;  Start 5/12/20 

at 09:54;  Stop 5/12/20 at 09:55;  Status DC


Hydromorphone HCl (Dilaudid Standard PCA) 12 mg STK-MED ONCE IV ;  Start 5/1/20 

at 15:50;  Stop 5/12/20 at 11:24;  Status DC


Potassium Chloride 80 meq/ Magnesium Sulfate 20 meq/ Multivitamins 10 

ml/Chromium/ Copper/Manganese/ Seleni/Zn 0.5 ml/ Insulin Human Regular 15 unit/ 

Total Parenteral Nutrition/Amino Acids/Dextrose/ Fat Emulsion Intravenous 1,920 

ml @  80 mls/hr TPN  CONT IV  Last administered on 5/12/20at 21:40;  Start 

5/12/20 at 22:00;  Stop 5/13/20 at 21:59;  Status DC


Lidocaine HCl (Buffered Lidocaine 1%) 6 ml 1X  ONCE INJ  Last administered on 

5/12/20at 14:15;  Start 5/12/20 at 14:15;  Stop 5/12/20 at 14:16;  Status DC


Potassium Chloride 80 meq/ Magnesium Sulfate 20 meq/ Multivitamins 10 

ml/Chromium/ Copper/Manganese/ Seleni/Zn 1 ml/ Insulin Human Regular 15 unit/ 

Total Parenteral Nutrition/Amino Acids/Dextrose/ Fat Emulsion Intravenous 1,920 

ml @  80 mls/hr TPN  CONT IV  Last administered on 5/13/20at 22:04;  Start 

5/13/20 at 22:00;  Stop 5/14/20 at 21:59;  Status DC


Potassium Chloride/Water 100 ml @  100 mls/hr 1X  ONCE IV  Last administered on 

5/14/20at 11:34;  Start 5/14/20 at 11:00;  Stop 5/14/20 at 11:59;  Status DC


Potassium Chloride 90 meq/ Magnesium Sulfate 20 meq/ Multivitamins 10 

ml/Chromium/ Copper/Manganese/ Seleni/Zn 1 ml/ Insulin Human Regular 15 unit/ 

Total Parenteral Nutrition/Amino Acids/Dextrose/ Fat Emulsion Intravenous 1,920 

ml @  80 mls/hr TPN  CONT IV  Last administered on 5/14/20at 22:57;  Start 

5/14/20 at 22:00;  Stop 5/15/20 at 21:59;  Status DC


Potassium Chloride 90 meq/ Magnesium Sulfate 20 meq/ Multivitamins 10 

ml/Chromium/ Copper/Manganese/ Seleni/Zn 1 ml/ Insulin Human Regular 15 unit/ 

Total Parenteral Nutrition/Amino Acids/Dextrose/ Fat Emulsion Intravenous 1,920 

ml @  80 mls/hr TPN  CONT IV  Last administered on 5/15/20at 22:48;  Start 

5/15/20 at 22:00;  Stop 5/16/20 at 21:59;  Status DC


Potassium Chloride 90 meq/ Magnesium Sulfate 20 meq/ Multivitamins 10 

ml/Chromium/ Copper/Manganese/ Seleni/Zn 1 ml/ Insulin Human Regular 15 unit/ 

Total Parenteral Nutrition/Amino Acids/Dextrose/ Fat Emulsion Intravenous 1,890 

ml @  78.75 mls/ hr TPN  CONT IV  Last administered on 5/16/20at 22:15;  Start 

5/16/20 at 22:00;  Stop 5/17/20 at 21:59;  Status DC


Linezolid/Dextrose 300 ml @  300 mls/hr Q12HR IV  Last administered on 5/19/20at

21:08;  Start 5/17/20 at 09:00;  Stop 5/20/20 at 08:11;  Status DC


Daptomycin 450 mg/ Sodium Chloride 50 ml @  100 mls/hr Q24H IV  Last 

administered on 5/20/20at 09:25;  Start 5/17/20 at 09:00;  Stop 5/21/20 at 

08:30;  Status DC


Potassium Chloride 90 meq/ Magnesium Sulfate 20 meq/ Multivitamins 10 

ml/Chromium/ Copper/Manganese/ Seleni/Zn 1 ml/ Insulin Human Regular 15 unit/ 

Total Parenteral Nutrition/Amino Acids/Dextrose/ Fat Emulsion Intravenous 1,890 

ml @  78.75 mls/ hr TPN  CONT IV  Last administered on 5/17/20at 21:34;  Start 

5/17/20 at 22:00;  Stop 5/18/20 at 21:59;  Status DC


Lorazepam (Ativan Inj) 2 mg STK-MED ONCE .ROUTE ;  Start 5/17/20 at 14:58;  Stop

5/17/20 at 14:58;  Status DC


Metoprolol Tartrate (Lopressor Vial) 5 mg 1X  ONCE IVP  Last administered on 

5/17/20at 15:31;  Start 5/17/20 at 15:15;  Stop 5/17/20 at 15:16;  Status DC


Lorazepam (Ativan Inj) 2 mg 1X  ONCE IVP  Last administered on 5/17/20at 15:30; 

Start 5/17/20 at 15:15;  Stop 5/17/20 at 15:16;  Status DC


Enoxaparin Sodium (Lovenox 40mg Syringe) 40 mg Q24H SQ  Last administered on 

6/5/20at 17:44;  Start 5/17/20 at 17:00;  Stop 6/7/20 at 06:50;  Status DC


Lorazepam (Ativan Inj) 1 mg PRN Q4HRS  PRN IVP ANXIETY / AGITATION MILD-MOD Last

administered on 5/31/20at 15:55;  Start 5/17/20 at 19:15;  Stop 6/2/20 at 11:45;

 Status DC


Lorazepam (Ativan Inj) 2 mg PRN Q4HRS  PRN IVP ANXIETY / AGITATION SEVERE Last 

administered on 6/1/20at 07:55;  Start 5/17/20 at 19:15;  Stop 6/2/20 at 11:45; 

Status DC


Fentanyl Citrate (Fentanyl 2ml Vial) 50 mcg PRN Q4HRS  PRN IVP SEVERE PAIN Last 

administered on 6/13/20at 05:15;  Start 5/18/20 at 13:15;  Stop 6/14/20 at 

09:29;  Status DC


Fentanyl Citrate (Fentanyl 2ml Vial) 25 mcg PRN Q4HRS  PRN IVP MODERATE PAIN 

Last administered on 6/13/20at 00:27;  Start 5/18/20 at 13:15;  Stop 6/14/20 at 

09:30;  Status DC


Potassium Chloride 90 meq/ Magnesium Sulfate 20 meq/ Multivitamins 10 

ml/Chromium/ Copper/Manganese/ Seleni/Zn 1 ml/ Insulin Human Regular 15 unit/ 

Total Parenteral Nutrition/Amino Acids/Dextrose/ Fat Emulsion Intravenous 1,890 

ml @  78.75 mls/ hr TPN  CONT IV  Last administered on 5/18/20at 22:18;  Start 

5/18/20 at 22:00;  Stop 5/19/20 at 21:59;  Status DC


Furosemide (Lasix) 40 mg 1X  ONCE IVP  Last administered on 5/18/20at 21:51;  

Start 5/18/20 at 21:45;  Stop 5/18/20 at 21:48;  Status DC


Albumin Human 100 ml @  100 mls/hr 1X PRN  PRN IV SEE COMMENTS;  Start 5/19/20 

at 01:30


Furosemide (Lasix) 40 mg [id number] IVP  Last administered on 6/3/20at 08:04;  Start 

5/19/20 at 14:00;  Stop 6/3/20 at 13:07;  Status DC


Potassium Chloride 90 meq/ Magnesium Sulfate 20 meq/ Multivitamins 10 

ml/Chromium/ Copper/Manganese/ Seleni/Zn 1 ml/ Insulin Human Regular 15 unit/ 

Total Parenteral Nutrition/Amino Acids/Dextrose/ Fat Emulsion Intravenous 1,800 

ml @  75 mls/hr TPN  CONT IV  Last administered on 5/19/20at 22:31;  Start 5 /19/20 at 22:00;  Stop 5/20/20 at 21:59;  Status DC


Potassium Chloride 90 meq/ Magnesium Sulfate 20 meq/ Multivitamins 10 ml/Chr

omium/ Copper/Manganese/ Seleni/Zn 1 ml/ Insulin Human Regular 15 unit/ Total 

Parenteral Nutrition/Amino Acids/Dextrose/ Fat Emulsion Intravenous 1,800 ml @  

75 mls/hr TPN  CONT IV  Last administered on 5/20/20at 22:28;  Start 5/20/20 at 

22:00;  Stop 5/21/20 at 21:59;  Status DC


Potassium Chloride 110 meq/ Magnesium Sulfate 20 meq/ Multivitamins 10 

ml/Chromium/ Copper/Manganese/ Seleni/Zn 1 ml/ Insulin Human Regular 15 unit/ 

Total Parenteral Nutrition/Amino Acids/Dextrose/ Fat Emulsion Intravenous 1,800 

ml @  75 mls/hr TPN  CONT IV  Last administered on 5/21/20at 22:01;  Start 

5/21/20 at 22:00;  Stop 5/22/20 at 21:59;  Status DC


Saliva Substitute (Biotene Moisturizing Mouth) 2 spray PRN Q15MIN  PRN PO DRY 

MOUTH;  Start 5/21/20 at 11:00


Potassium Chloride 110 meq/ Magnesium Sulfate 20 meq/ Multivitamins 10 

ml/Chromium/ Copper/Manganese/ Seleni/Zn 1 ml/ Insulin Human Regular 15 unit/ T

otal Parenteral Nutrition/Amino Acids/Dextrose/ Fat Emulsion Intravenous 1,800 

ml @  75 mls/hr TPN  CONT IV  Last administered on 5/22/20at 22:21;  Start 

5/22/20 at 22:00;  Stop 5/23/20 at 21:59;  Status DC


Potassium Chloride 110 meq/ Magnesium Sulfate 20 meq/ Multivitamins 10 

ml/Chromium/ Copper/Manganese/ Seleni/Zn 1 ml/ Insulin Human Regular 15 unit/ 

Total Parenteral Nutrition/Amino Acids/Dextrose/ Fat Emulsion Intravenous 1,800 

ml @  75 mls/hr TPN  CONT IV  Last administered on 5/23/20at 22:04;  Start 5 /23/20 at 22:00;  Stop 5/24/20 at 21:59;  Status DC


Potassium Chloride 110 meq/ Magnesium Sulfate 20 meq/ Multivitamins 10 ml/Ch

romium/ Copper/Manganese/ Seleni/Zn 1 ml/ Insulin Human Regular 15 unit/ Total 

Parenteral Nutrition/Amino Acids/Dextrose/ Fat Emulsion Intravenous 1,800 ml @  

75 mls/hr TPN  CONT IV  Last administered on 5/24/20at 22:48;  Start 5/24/20 at 

22:00;  Stop 5/25/20 at 21:59;  Status DC


Potassium Chloride 70 meq/ Magnesium Sulfate 20 meq/ Multivitamins 10 

ml/Chromium/ Copper/Manganese/ Seleni/Zn 1 ml/ Insulin Human Regular 15 unit/ 

Total Parenteral Nutrition/Amino Acids/Dextrose/ Fat Emulsion Intravenous 1,800 

ml @  75 mls/hr TPN  CONT IV  Last administered on 5/25/20at 21:39;  Start 

5/25/20 at 22:00;  Stop 5/26/20 at 21:59;  Status DC


Meropenem 500 mg/ Sodium Chloride 50 ml @  100 mls/hr Q6HRS IV  Last 

administered on 5/27/20at 06:02;  Start 5/25/20 at 18:00;  Stop 5/27/20 at 

09:59;  Status DC


Barium Sulfate (Varibar Thin Liquid Apple) 148 gm 1X  ONCE PO ;  Start 5/26/20 

at 11:45;  Stop 5/26/20 at 11:49;  Status DC


Potassium Chloride 70 meq/ Magnesium Sulfate 20 meq/ Multivitamins 10 

ml/Chromium/ Copper/Manganese/ Seleni/Zn 1 ml/ Insulin Human Regular 15 unit/ 

Total Parenteral Nutrition/Amino Acids/Dextrose/ Fat Emulsion Intravenous 1,800 

ml @  75 mls/hr TPN  CONT IV  Last administered on 5/26/20at 22:27;  Start 

5/26/20 at 22:00;  Stop 5/27/20 at 21:59;  Status DC


Piperacillin Sod/ Tazobactam Sod 3.375 gm/Sodium Chloride 50 ml @  100 mls/hr 

Q6HRS IV  Last administered on 6/4/20at 06:10;  Start 5/27/20 at 12:00;  Stop 

6/4/20 at 07:26;  Status DC


Potassium Chloride 70 meq/ Magnesium Sulfate 20 meq/ Multivitamins 10 ml/

Chromium/ Copper/Manganese/ Seleni/Zn 1 ml/ Insulin Human Regular 15 unit/ Total

Parenteral Nutrition/Amino Acids/Dextrose/ Fat Emulsion Intravenous 1,800 ml @  

75 mls/hr TPN  CONT IV  Last administered on 5/27/20at 22:03;  Start 5/27/20 at 

22:00;  Stop 5/28/20 at 21:59;  Status DC


Potassium Chloride 70 meq/ Magnesium Sulfate 20 meq/ Multivitamins 10 

ml/Chromium/ Copper/Manganese/ Seleni/Zn 1 ml/ Insulin Human Regular 15 unit/ 

Total Parenteral Nutrition/Amino Acids/Dextrose/ Fat Emulsion Intravenous 1,800 

ml @  75 mls/hr TPN  CONT IV  Last administered on 5/28/20at 22:33;  Start 

5/28/20 at 22:00;  Stop 5/29/20 at 21:59;  Status DC


Potassium Chloride 70 meq/ Magnesium Sulfate 20 meq/ Multivitamins 10 

ml/Chromium/ Copper/Manganese/ Seleni/Zn 1 ml/ Insulin Human Regular 15 unit/ 

Total Parenteral Nutrition/Amino Acids/Dextrose/ Fat Emulsion Intravenous 1,800 

ml @  75 mls/hr TPN  CONT IV  Last administered on 5/29/20at 23:13;  Start 

5/29/20 at 22:00;  Stop 5/30/20 at 21:59;  Status DC


Potassium Chloride 80 meq/ Magnesium Sulfate 20 meq/ Multivitamins 10 

ml/Chromium/ Copper/Manganese/ Seleni/Zn 1 ml/ Insulin Human Regular 15 unit/ 

Total Parenteral Nutrition/Amino Acids/Dextrose/ Fat Emulsion Intravenous 1,800 

ml @  75 mls/hr TPN  CONT IV  Last administered on 5/30/20at 22:30;  Start 

5/30/20 at 22:00;  Stop 5/31/20 at 21:59;  Status DC


Potassium Chloride 80 meq/ Magnesium Sulfate 20 meq/ Multivitamins 10 

ml/Chromium/ Copper/Manganese/ Seleni/Zn 1 ml/ Insulin Human Regular 15 unit/ 

Total Parenteral Nutrition/Amino Acids/Dextrose/ Fat Emulsion Intravenous 1,800 

ml @  75 mls/hr TPN  CONT IV  Last administered on 5/31/20at 21:54;  Start 

5/31/20 at 22:00;  Stop 6/1/20 at 21:59;  Status DC


Potassium Chloride/Water 100 ml @  100 mls/hr 1X  ONCE IV  Last administered on 

6/1/20at 10:15;  Start 6/1/20 at 10:00;  Stop 6/1/20 at 10:59;  Status DC


Potassium Chloride 90 meq/ Magnesium Sulfate 20 meq/ Multivitamins 10 

ml/Chromium/ Copper/Manganese/ Seleni/Zn 1 ml/ Insulin Human Regular 20 unit/ 

Total Parenteral Nutrition/Amino Acids/Dextrose/ Fat Emulsion Intravenous 1,800 

ml @  75 mls/hr TPN  CONT IV  Last administered on 6/1/20at 22:28;  Start 6/1/20

at 22:00;  Stop 6/2/20 at 21:59;  Status DC


Potassium Chloride 90 meq/ Magnesium Sulfate 20 meq/ Multivitamins 10 

ml/Chromium/ Copper/Manganese/ Seleni/Zn 1 ml/ Insulin Human Regular 20 unit/ 

Total Parenteral Nutrition/Amino Acids/Dextrose/ Fat Emulsion Intravenous 1,800 

ml @  75 mls/hr TPN  CONT IV  Last administered on 6/2/20at 22:08;  Start 6/2/20

at 22:00;  Stop 6/3/20 at 21:59;  Status DC


Lorazepam (Ativan Inj) 0.25 mg PRN Q4HRS  PRN IVP ANXIETY / AGITATION Last 

administered on 7/12/20at 00:27;  Start 6/3/20 at 07:30


Potassium Chloride 90 meq/ Magnesium Sulfate 20 meq/ Multivitamins 10 

ml/Chromium/ Copper/Manganese/ Seleni/Zn 1 ml/ Insulin Human Regular 20 unit/ 

Total Parenteral Nutrition/Amino Acids/Dextrose/ Fat Emulsion Intravenous 1,800 

ml @  75 mls/hr TPN  CONT IV  Last administered on 6/3/20at 23:13;  Start 6/3/20

at 22:00;  Stop 6/4/20 at 21:59;  Status DC


Furosemide (Lasix) 40 mg DAILY IVP  Last administered on 6/5/20at 11:14;  Start 

6/3/20 at 13:30;  Stop 6/7/20 at 09:12;  Status DC


Fluoxetine HCl (PROzac) 20 mg QHS PEG  Last administered on 7/14/20at 20:36;  

Start 6/4/20 at 21:00


Fentanyl (Duragesic 50mcg/ Hr Patch) 1 patch Q72H TD  Last administered on 

6/4/20at 21:22;  Start 6/4/20 at 21:00;  Stop 6/13/20 at 12:00;  Status DC


Potassium Chloride 40 meq/ Potassium Acetate 60 meq/Magnesium Sulfate 10 meq/ 

Multivitamins 10 ml/Chromium/ Copper/Manganese/ Seleni/Zn 1 ml/ Insulin Human 

Regular 20 unit/ Total Parenteral Nutrition/Amino Acids/Dextrose/ Fat Emulsion 

Intravenous 1,800 ml @  75 mls/hr TPN  CONT IV  Last administered on 6/5/20at 

00:03;  Start 6/4/20 at 22:00;  Stop 6/5/20 at 21:59;  Status DC


Potassium Acetate 80 meq/Magnesium Sulfate 5 meq/ Multivitamins 10 ml/Chromium/ 

Copper/Manganese/ Seleni/Zn 1 ml/ Insulin Human Regular 20 unit/ Total Parenter

al Nutrition/Amino Acids/Dextrose/ Fat Emulsion Intravenous 1,920 ml @  80 

mls/hr TPN  CONT IV  Last administered on 6/5/20at 21:59;  Start 6/5/20 at 

22:00;  Stop 6/6/20 at 21:59;  Status DC


Potassium Acetate 60 meq/Magnesium Sulfate 5 meq/ Multivitamins 10 ml/Chromium/ 

Copper/Manganese/ Seleni/Zn 1 ml/ Insulin Human Regular 30 unit/ Total 

Parenteral Nutrition/Amino Acids/Dextrose/ Fat Emulsion Intravenous 1,920 ml @  

80 mls/hr TPN  CONT IV  Last administered on 6/6/20at 21:54;  Start 6/6/20 at 

22:00;  Stop 6/7/20 at 21:59;  Status DC


Norepinephrine Bitartrate 8 mg/ Dextrose 258 ml @  13.332 mls/ hr CONT  PRN IV 

PER PROTOCOL Last administered on 7/2/20at 09:09;  Start 6/7/20 at 06:30


Albumin Human 500 ml @  125 mls/hr 1X  ONCE IV  Last administered on 6/7/20at 

08:10;  Start 6/7/20 at 08:15;  Stop 6/7/20 at 12:14;  Status DC


Potassium Acetate 40 meq/Magnesium Sulfate 5 meq/ Multivitamins 10 ml/Chromium/ 

Copper/Manganese/ Seleni/Zn 1 ml/ Insulin Human Regular 30 unit/ Total 

Parenteral Nutrition/Amino Acids/Dextrose/ Fat Emulsion Intravenous 1,920 ml @  

80 mls/hr TPN  CONT IV  Last administered on 6/7/20at 22:23;  Start 6/7/20 at 

22:00;  Stop 6/8/20 at 21:59;  Status DC


Meropenem 1 gm/ Sodium Chloride 100 ml @  200 mls/hr Q8HRS IV ;  Start 6/7/20 at

14:00;  Status Cancel


Meropenem 1 gm/ Sodium Chloride 100 ml @  200 mls/hr Q8HRS IV  Last administered

on 6/7/20at 11:04;  Start 6/7/20 at 10:00;  Stop 6/7/20 at 13:00;  Status DC


Meropenem 1 gm/ Sodium Chloride 100 ml @  200 mls/hr Q12HR IV  Last administered

on 6/25/20at 08:27;  Start 6/7/20 at 21:00;  Stop 6/25/20 at 08:56;  Status DC


Sodium Chloride 1,000 ml @  1,000 mls/hr 1X  ONCE IV  Last administered on 

6/7/20at 11:06;  Start 6/7/20 at 10:45;  Stop 6/7/20 at 11:44;  Status DC


Micafungin Sodium 100 mg/Dextrose 100 ml @  100 mls/hr Q24H IV  Last 

administered on 6/24/20at 12:34;  Start 6/7/20 at 11:00;  Stop 6/25/20 at 08:56;

 Status DC


Daptomycin 410 mg/ Sodium Chloride 50 ml @  100 mls/hr Q24H IV  Last 

administered on 6/9/20at 13:33;  Start 6/7/20 at 14:00;  Stop 6/10/20 at 08:30; 

Status DC


Midazolam HCl (Versed) 2 mg STK-MED ONCE .ROUTE ;  Start 6/7/20 at 14:47;  Stop 

6/7/20 at 14:48;  Status DC


Fentanyl Citrate (Fentanyl 2ml Vial) 100 mcg STK-MED ONCE .ROUTE ;  Start 6/7/20

at 14:47;  Stop 6/7/20 at 14:48;  Status DC


Flumazenil (Romazicon) 0.5 mg STK-MED ONCE IV ;  Start 6/7/20 at 14:48;  Stop 

6/7/20 at 14:48;  Status DC


Naloxone HCl (Narcan) 0.4 mg STK-MED ONCE .ROUTE ;  Start 6/7/20 at 14:48;  Stop

6/7/20 at 14:48;  Status DC


Lidocaine HCl (Lidocaine 1% 20ml Vial) 20 ml STK-MED ONCE .ROUTE ;  Start 6/7/20

at 14:48;  Stop 6/7/20 at 14:48;  Status DC


Midazolam HCl (Versed) 2 mg 1X  ONCE IV  Last administered on 6/7/20at 15:28;  

Start 6/7/20 at 15:00;  Stop 6/7/20 at 15:01;  Status DC


Fentanyl Citrate (Fentanyl 2ml Vial) 100 mcg 1X  ONCE IV  Last administered on 

6/7/20at 15:28;  Start 6/7/20 at 15:00;  Stop 6/7/20 at 15:01;  Status DC


Lidocaine HCl (Lidocaine 1% 20ml Vial) 20 ml 1X  ONCE INJ  Last administered on 

6/7/20at 15:30;  Start 6/7/20 at 15:00;  Stop 6/7/20 at 15:01;  Status DC


Sodium Chloride 1,000 ml @  100 mls/hr Q10H IV  Last administered on 6/16/20at 

07:30;  Start 6/7/20 at 20:00;  Stop 6/16/20 at 11:26;  Status DC


Sodium Bicarbonate (Sodium Bicarb Adult 8.4% Syr) 50 meq 1X  ONCE IV  Last 

administered on 6/7/20at 21:47;  Start 6/7/20 at 22:00;  Stop 6/7/20 at 22:01;  

Status DC


Potassium Acetate 40 meq/Magnesium Sulfate 5 meq/ Multivitamins 10 ml/Chromium/ 

Copper/Manganese/ Seleni/Zn 1 ml/ Insulin Human Regular 30 unit/ Total 

Parenteral Nutrition/Amino Acids/Dextrose/ Fat Emulsion Intravenous 1,920 ml @  

80 mls/hr TPN  CONT IV  Last administered on 6/8/20at 22:28;  Start 6/8/20 at 

22:00;  Stop 6/9/20 at 21:59;  Status DC


Sodium Chloride 500 ml @  500 mls/hr 1X  ONCE IV  Last administered on 6/9/20at 

06:39;  Start 6/9/20 at 06:45;  Stop 6/9/20 at 07:44;  Status DC


Potassium Acetate 40 meq/Magnesium Sulfate 5 meq/ Multivitamins 10 ml/Chromium/ 

Copper/Manganese/ Seleni/Zn 1 ml/ Insulin Human Regular 30 unit/ Total 

Parenteral Nutrition/Amino Acids/Dextrose/ Fat Emulsion Intravenous 1,920 ml @  

80 mls/hr TPN  CONT IV  Last administered on 6/9/20at 22:03;  Start 6/9/20 at 

22:00;  Stop 6/10/20 at 21:59;  Status DC


Metoprolol Tartrate (Lopressor Vial) 5 mg PRN Q6HRS  PRN IVP HYPERTENSION Last 

administered on 7/12/20at 18:01;  Start 6/10/20 at 09:00


Potassium Acetate 40 meq/Magnesium Sulfate 5 meq/ Multivitamins 10 ml/Chromium/ 

Copper/Manganese/ Seleni/Zn 1 ml/ Insulin Human Regular 30 unit/ Total 

Parenteral Nutrition/Amino Acids/Dextrose/ Fat Emulsion Intravenous 1,920 ml @  

80 mls/hr TPN  CONT IV  Last administered on 6/10/20at 21:26;  Start 6/10/20 at 

22:00;  Stop 6/11/20 at 21:59;  Status DC


Potassium Acetate 40 meq/Magnesium Sulfate 5 meq/ Multivitamins 10 ml/Chromium/ 

Copper/Manganese/ Seleni/Zn 1 ml/ Insulin Human Regular 30 unit/ Total 

Parenteral Nutrition/Amino Acids/Dextrose/ Fat Emulsion Intravenous 1,920 ml @  

80 mls/hr TPN  CONT IV  Last administered on 6/11/20at 23:23;  Start 6/11/20 at 

22:00;  Stop 6/12/20 at 21:59;  Status DC


Potassium Acetate 40 meq/Magnesium Sulfate 5 meq/ Multivitamins 10 ml/Chromium/ 

Copper/Manganese/ Seleni/Zn 1 ml/ Insulin Human Regular 30 unit/ Total Parent

eral Nutrition/Amino Acids/Dextrose/ Fat Emulsion Intravenous 1,920 ml @  80 

mls/hr TPN  CONT IV  Last administered on 6/12/20at 21:35;  Start 6/12/20 at 

22:00;  Stop 6/13/20 at 21:59;  Status DC


Furosemide (Lasix) 20 mg 1X  ONCE IVP  Last administered on 6/13/20at 06:26;  

Start 6/13/20 at 06:15;  Stop 6/13/20 at 06:16;  Status DC


Methylprednisolone Sodium Succinate (SOLU-Medrol 125MG VIAL) 125 mg 1X  ONCE IV 

Last administered on 6/13/20at 06:26;  Start 6/13/20 at 06:15;  Stop 6/13/20 at 

06:16;  Status DC


Albuterol/ Ipratropium (Duoneb) 3 ml Q4HRS NEB  Last administered on 7/15/20at 

11:55;  Start 6/13/20 at 08:00


Fentanyl Citrate 30 ml @ 0 mls/hr CONT  PRN IV SEE PROTOCOL Last administered on

7/4/20at 08:03;  Start 6/13/20 at 06:00;  Stop 7/4/20 at 12:42;  Status DC


Propofol 100 ml @ 0 mls/hr CONT  PRN IV SEE PROTOCOL Last administered on 

6/20/20at 23:50;  Start 6/13/20 at 06:00


Fentanyl Citrate (Fentanyl 2ml Vial) 25 mcg PRN Q1HR  PRN IV SEE COMMENTS Last 

administered on 7/14/20at 20:37;  Start 6/13/20 at 06:00


Fentanyl Citrate (Fentanyl 2ml Vial) 50 mcg PRN Q1HR  PRN IV SEE COMMENTS Last 

administered on 7/12/20at 18:02;  Start 6/13/20 at 06:00


Chlorhexidine Gluconate (Peridex) 15 ml BID MM ;  Start 6/13/20 at 09:00;  Stop 

6/13/20 at 07:58;  Status DC


Potassium Acetate 40 meq/Magnesium Sulfate 5 meq/ Multivitamins 10 ml/Chromium/ 

Copper/Manganese/ Seleni/Zn 1 ml/ Insulin Human Regular 30 unit/ Total 

Parenteral Nutrition/Amino Acids/Dextrose/ Fat Emulsion Intravenous 1,920 ml @  

80 mls/hr TPN  CONT IV  Last administered on 6/13/20at 21:19;  Start 6/13/20 at 

22:00;  Stop 6/14/20 at 21:59;  Status DC


Acetylcysteine (Mucomyst 20% Resp Treatment) 600 mg BID NEB  Last administered 

on 6/19/20at 09:33;  Start 6/13/20 at 21:00;  Stop 6/19/20 at 10:39;  Status DC


Magnesium Sulfate 100 ml @  25 mls/hr 1X  ONCE IV  Last administered on 

6/13/20at 15:48;  Start 6/13/20 at 15:45;  Stop 6/13/20 at 19:44;  Status DC


Potassium Acetate 40 meq/Magnesium Sulfate 5 meq/ Multivitamins 10 ml/Chromium/ 

Copper/Manganese/ Seleni/Zn 1 ml/ Insulin Human Regular 30 unit/ Total 

Parenteral Nutrition/Amino Acids/Dextrose/ Fat Emulsion Intravenous 1,920 ml @  

80 mls/hr TPN  CONT IV  Last administered on 6/14/20at 21:35;  Start 6/14/20 at 

22:00;  Stop 6/15/20 at 21:59;  Status DC


Potassium Chloride/Water 100 ml @  100 mls/hr Q1H IV  Last administered on 

6/15/20at 08:31;  Start 6/15/20 at 07:00;  Stop 6/15/20 at 08:59;  Status DC


Potassium Acetate 40 meq/Magnesium Sulfate 5 meq/ Multivitamins 10 ml/Chromium/ 

Copper/Manganese/ Seleni/Zn 1 ml/ Insulin Human Regular 30 unit/ Total 

Parenteral Nutrition/Amino Acids/Dextrose/ Fat Emulsion Intravenous 1,920 ml @  

80 mls/hr TPN  CONT IV  Last administered on 6/15/20at 21:54;  Start 6/15/20 at 

22:00;  Stop 6/16/20 at 19:34;  Status DC


Lidocaine HCl (Buffered Lidocaine 1%) 3 ml STK-MED ONCE .ROUTE ;  Start 6/15/20 

at 12:14;  Stop 6/15/20 at 12:14;  Status DC


Lidocaine HCl (Buffered Lidocaine 1%) 3 ml 1X  ONCE IJ  Last administered on 

6/15/20at 13:11;  Start 6/15/20 at 13:00;  Stop 6/15/20 at 13:01;  Status DC


Magnesium Sulfate 50 ml @ 25 mls/hr 1X  ONCE IV ;  Start 6/16/20 at 08:15;  Stop

6/16/20 at 10:14;  Status DC


Potassium Acetate 40 meq/Magnesium Sulfate 10 meq/ Multivitamins 10 ml/Chromium/

Copper/Manganese/ Seleni/Zn 1 ml/ Insulin Human Regular 20 unit/ Total 

Parenteral Nutrition/Amino Acids/Dextrose/ Fat Emulsion Intravenous 1,920 ml @  

80 mls/hr TPN  CONT IV  Last administered on 6/16/20at 21:32;  Start 6/16/20 at 

22:00;  Stop 6/17/20 at 21:59;  Status DC


Potassium Chloride/Water 100 ml @  100 mls/hr Q1H IV  Last administered on 

6/17/20at 09:12;  Start 6/17/20 at 08:00;  Stop 6/17/20 at 09:59;  Status DC


Alteplase, Recombinant (Cathflo For Central Catheter Clearance) 4 mg 1X  ONCE 

INT CAT ;  Start 6/17/20 at 09:15;  Stop 6/17/20 at 09:16;  Status UNV


Alteplase, Recombinant (Cathflo For Central Catheter Clearance) 4 mg 1X  ONCE 

INT CAT ;  Start 6/17/20 at 09:15;  Stop 6/17/20 at 09:16;  Status UNV


Alteplase, Recombinant (Cathflo For Central Catheter Clearance) 4 mg 1X  ONCE 

INT CAT ;  Start 6/17/20 at 09:15;  Stop 6/17/20 at 09:16;  Status UNV


Alteplase, Recombinant 4 mg/ Sodium Chloride 20 ml @ 20 mls/hr 1X  ONCE IV  Last

administered on 6/17/20at 10:10;  Start 6/17/20 at 10:00;  Stop 6/17/20 at 

10:59;  Status DC


Alteplase, Recombinant 4 mg/ Sodium Chloride 20 ml @ 20 mls/hr 1X  ONCE IV  Last

administered on 6/17/20at 10:09;  Start 6/17/20 at 10:00;  Stop 6/17/20 at 

10:59;  Status DC


Alteplase, Recombinant 4 mg/ Sodium Chloride 20 ml @ 20 mls/hr 1X  ONCE IV  Last

administered on 6/17/20at 10:09;  Start 6/17/20 at 10:00;  Stop 6/17/20 at 

10:59;  Status DC


Potassium Acetate 60 meq/Magnesium Sulfate 10 meq/ Multivitamins 10 ml/Chromium/

Copper/Manganese/ Seleni/Zn 1 ml/ Insulin Human Regular 20 unit/ Total 

Parenteral Nutrition/Amino Acids/Dextrose/ Fat Emulsion Intravenous 1,920 ml @  

80 mls/hr TPN  CONT IV  Last administered on 6/17/20at 21:55;  Start 6/17/20 at 

22:00;  Stop 6/18/20 at 21:59;  Status DC


Albumin Human 500 ml @  125 mls/hr 1X  ONCE IV  Last administered on 6/18/20at 

12:01;  Start 6/18/20 at 11:15;  Stop 6/18/20 at 15:14;  Status DC


Sodium Chloride 500 ml @  500 mls/hr 1X  ONCE IV  Last administered on 6/18/20at

13:50;  Start 6/18/20 at 11:15;  Stop 6/18/20 at 12:14;  Status DC


Potassium Acetate 60 meq/Magnesium Sulfate 14 meq/ Multivitamins 10 ml/Chromium/

Copper/Manganese/ Seleni/Zn 1 ml/ Insulin Human Regular 20 unit/ Total 

Parenteral Nutrition/Amino Acids/Dextrose/ Fat Emulsion Intravenous 1,920 ml @  

80 mls/hr TPN  CONT IV  Last administered on 6/18/20at 22:26;  Start 6/18/20 at 

22:00;  Stop 6/19/20 at 21:59;  Status DC


Ciprofloxacin/ Dextrose 200 ml @  200 mls/hr Q12HR IV  Last administered on 

6/25/20at 08:27;  Start 6/18/20 at 21:00;  Stop 6/25/20 at 08:56;  Status DC


Albumin Human 250 ml @  62.5 mls/hr 1X  ONCE IV  Last administered on 6/19/20at 

11:09;  Start 6/19/20 at 11:00;  Stop 6/19/20 at 14:59;  Status DC


Furosemide (Lasix) 20 mg 1X  ONCE IVP  Last administered on 6/19/20at 14:52;  

Start 6/19/20 at 10:45;  Stop 6/19/20 at 10:49;  Status DC


Potassium Acetate 60 meq/Magnesium Sulfate 14 meq/ Multivitamins 10 ml/Chromium/

Copper/Manganese/ Seleni/Zn 1 ml/ Insulin Human Regular 15 unit/ Total 

Parenteral Nutrition/Amino Acids/Dextrose/ Fat Emulsion Intravenous 1,920 ml @  

80 mls/hr TPN  CONT IV  Last administered on 6/19/20at 22:08;  Start 6/19/20 at 

22:00;  Stop 6/20/20 at 21:59;  Status DC


Potassium Acetate 60 meq/Magnesium Sulfate 14 meq/ Multivitamins 10 ml/Chromium/

Copper/Manganese/ Seleni/Zn 1 ml/ Insulin Human Regular 15 unit/ Total 

Parenteral Nutrition/Amino Acids/Dextrose/ Fat Emulsion Intravenous 1,920 ml @  

80 mls/hr TPN  CONT IV  Last administered on 6/20/20at 22:12;  Start 6/20/20 at 

22:00;  Stop 6/21/20 at 21:59;  Status DC


Potassium Acetate 60 meq/Magnesium Sulfate 14 meq/ Multivitamins 10 ml/Chromium/

Copper/Manganese/ Seleni/Zn 1 ml/ Insulin Human Regular 15 unit/ Total 

Parenteral Nutrition/Amino Acids/Dextrose/ Fat Emulsion Intravenous 1,920 ml @  

80 mls/hr TPN  CONT IV  Last administered on 6/21/20at 22:22;  Start 6/21/20 at 

22:00;  Stop 6/22/20 at 21:59;  Status DC


Furosemide (Lasix) 20 mg 1X  ONCE IVP  Last administered on 6/22/20at 11:07;  

Start 6/22/20 at 10:30;  Stop 6/22/20 at 10:34;  Status DC


Potassium Acetate 60 meq/Magnesium Sulfate 14 meq/ Multivitamins 10 ml/Chromium/

Copper/Manganese/ Seleni/Zn 1 ml/ Insulin Human Regular 15 unit/ Sodium Chloride

20 meq/Total Parenteral Nutrition/Amino Acids/Dextrose/ Fat Emulsion Intravenous

1,920 ml @  80 mls/hr TPN  CONT IV  Last administered on 6/22/20at 21:54;  Start

6/22/20 at 22:00;  Stop 6/23/20 at 21:59;  Status DC


Potassium Acetate 30 meq/Magnesium Sulfate 14 meq/ Multivitamins 10 ml/Chromium/

Copper/Manganese/ Seleni/Zn 1 ml/ Insulin Human Regular 15 unit/ Sodium Chloride

20 meq/Potassium Chloride 30 meq/ Total Parenteral Nutrition/Amino 

Acids/Dextrose/ Fat Emulsion Intravenous 1,920 ml @  80 mls/hr TPN  CONT IV  

Last administered on 6/23/20at 21:46;  Start 6/23/20 at 22:00;  Stop 6/24/20 at 

21:59;  Status DC


Sodium Chloride 80 meq/Potassium Chloride 30 meq/ Potassium Acetate 30 

meq/Magnesium Sulfate 14 meq/ Multivitamins 10 ml/Chromium/ Copper/Manganese/ 

Seleni/Zn 1 ml/ Insulin Human Regular 15 unit/ Total Parenteral Nutrition/Amino 

Acids/Dextrose/ Fat Emulsion Intravenous 1,920 ml @  80 mls/hr TPN  CONT IV  

Last administered on 6/24/20at 22:33;  Start 6/24/20 at 22:00;  Stop 6/25/20 at 

21:59;  Status DC


Furosemide (Lasix) 40 mg 1X  ONCE IVP  Last administered on 6/24/20at 16:27;  

Start 6/24/20 at 15:30;  Stop 6/24/20 at 15:33;  Status DC


Albumin Human 250 ml @  62.5 mls/hr 1X  ONCE IV  Last administered on 6/24/20at 

16:27;  Start 6/24/20 at 15:30;  Stop 6/24/20 at 19:29;  Status DC


Sodium Chloride 80 meq/Potassium Chloride 30 meq/ Potassium Acetate 30 

meq/Magnesium Sulfate 14 meq/ Multivitamins 10 ml/Chromium/ Copper/Manganese/ 

Seleni/Zn 1 ml/ Insulin Human Regular 15 unit/ Total Parenteral Nutrition/Amino 

Acids/Dextrose/ Fat Emulsion Intravenous 1,920 ml @  80 mls/hr TPN  CONT IV  

Last administered on 6/25/20at 22:25;  Start 6/25/20 at 22:00;  Stop 6/26/20 at 

21:59;  Status DC


Sodium Chloride 80 meq/Potassium Chloride 30 meq/ Potassium Acetate 30 

meq/Magnesium Sulfate 14 meq/ Multivitamins 10 ml/Chromium/ Copper/Manganese/ 

Seleni/Zn 1 ml/ Insulin Human Regular 15 unit/ Total Parenteral Nutrition/Amino 

Acids/Dextrose/ Fat Emulsion Intravenous 1,920 ml @  80 mls/hr TPN  CONT IV  

Last administered on 6/26/20at 21:32;  Start 6/26/20 at 22:00;  Stop 6/27/20 at 

21:59;  Status DC


Sodium Chloride 80 meq/Potassium Chloride 30 meq/ Potassium Acetate 30 

meq/Magnesium Sulfate 14 meq/ Multivitamins 10 ml/Chromium/ Copper/Manganese/ 

Seleni/Zn 1 ml/ Insulin Human Regular 15 unit/ Total Parenteral Nutrition/Amino 

Acids/Dextrose/ Fat Emulsion Intravenous 1,920 ml @  80 mls/hr TPN  CONT IV  

Last administered on 6/27/20at 21:53;  Start 6/27/20 at 22:00;  Stop 6/28/20 at 

21:59;  Status DC


Acetylcysteine (Mucomyst 20% Resp Treatment) 600 mg RTBID NEB  Last administered

on 7/15/20at 07:18;  Start 6/27/20 at 12:00


Sodium Chloride 80 meq/Potassium Chloride 30 meq/ Potassium Acetate 30 

meq/Magnesium Sulfate 14 meq/ Multivitamins 10 ml/Chromium/ Copper/Manganese/ 

Seleni/Zn 1 ml/ Insulin Human Regular 15 unit/ Total Parenteral Nutrition/Amino 

Acids/Dextrose/ Fat Emulsion Intravenous 1,920 ml @  80 mls/hr TPN  CONT IV  

Last administered on 6/28/20at 22:06;  Start 6/28/20 at 22:00;  Stop 6/29/20 at 

21:59;  Status DC


Meropenem 500 mg/ Sodium Chloride 50 ml @  100 mls/hr Q6HRS IV  Last 

administered on 7/15/20at 05:56;  Start 6/28/20 at 18:00


Daptomycin 500 mg/ Sodium Chloride 50 ml @  100 mls/hr Q24H IV  Last 

administered on 7/6/20at 21:47;  Start 6/28/20 at 19:00;  Stop 7/7/20 at 08:13; 

Status DC


Sodium Chloride 80 meq/Potassium Chloride 30 meq/ Potassium Acetate 30 

meq/Magnesium Sulfate 14 meq/ Multivitamins 10 ml/Chromium/ Copper/Manganese/ 

Seleni/Zn 1 ml/ Insulin Human Regular 15 unit/ Total Parenteral Nutrition/Amino 

Acids/Dextrose/ Fat Emulsion Intravenous 1,920 ml @  80 mls/hr TPN  CONT IV  

Last administered on 6/29/20at 22:09;  Start 6/29/20 at 22:00;  Stop 6/30/20 at 

21:59;  Status DC


Heparin Sodium (Porcine) 1000 unit/Sodium Chloride 1,001 ml @  1,001 mls/hr 1X  

ONCE IRR ;  Start 6/30/20 at 06:00;  Stop 6/30/20 at 06:59;  Status DC


Propofol (Diprivan) 200 mg STK-MED ONCE IV ;  Start 6/30/20 at 07:44;  Stop 

6/30/20 at 07:44;  Status DC


Lidocaine HCl (Lidocaine Pf 2% Vial) 5 ml STK-MED ONCE .ROUTE ;  Start 6/30/20 

at 07:44;  Stop 6/30/20 at 07:44;  Status DC


Fentanyl Citrate (Fentanyl 2ml Vial) 100 mcg STK-MED ONCE .ROUTE ;  Start 

6/30/20 at 07:44;  Stop 6/30/20 at 07:44;  Status DC


Rocuronium Bromide (Zemuron) 100 mg STK-MED ONCE .ROUTE ;  Start 6/30/20 at 

07:44;  Stop 6/30/20 at 07:44;  Status DC


Micafungin Sodium 100 mg/Dextrose 100 ml @  100 mls/hr Q24H IV  Last 

administered on 7/15/20at 08:15;  Start 6/30/20 at 08:30


Bupivacaine HCl/ Epinephrine Bitart (Sensorcain-Epi 0.5%-1:728210 Mpf) 30 ml 

STK-MED ONCE .ROUTE ;  Start 6/30/20 at 08:34;  Stop 6/30/20 at 08:35;  Status 

DC


Iohexol (Omnipaque 300 Mg/ml) 50 ml STK-MED ONCE .ROUTE  Last administered on 

6/30/20at 13:30;  Start 6/30/20 at 08:35;  Stop 6/30/20 at 08:35;  Status DC


Sodium Chloride 80 meq/Potassium Chloride 30 meq/ Potassium Acetate 30 

meq/Magnesium Sulfate 14 meq/ Multivitamins 10 ml/Chromium/ Copper/Manganese/ 

Seleni/Zn 1 ml/ Insulin Human Regular 15 unit/ Total Parenteral Nutrition/Amino 

Acids/Dextrose/ Fat Emulsion Intravenous 1,920 ml @  80 mls/hr TPN  CONT IV  

Last administered on 7/1/20at 01:22;  Start 6/30/20 at 22:00;  Stop 7/1/20 at 

21:59;  Status DC


Phenylephrine HCl (Ken-Synephrine Inj) 10 mg STK-MED ONCE .ROUTE ;  Start 

6/30/20 at 10:15;  Stop 6/30/20 at 10:15;  Status DC


Desflurane (Suprane) 90 ml STK-MED ONCE IH ;  Start 6/30/20 at 10:18;  Stop 

6/30/20 at 10:19;  Status DC


Albumin Human 500 ml @  As Directed STK-MED ONCE IV ;  Start 6/30/20 at 11:06;  

Stop 6/30/20 at 11:06;  Status DC


Vasopressin (Vasostrict) 20 unit STK-MED ONCE .ROUTE ;  Start 6/30/20 at 12:23; 

Stop 6/30/20 at 12:23;  Status DC


Phenylephrine HCl (Ken-Synephrine Inj) 10 mg STK-MED ONCE .ROUTE ;  Start 

6/30/20 at 13:33;  Stop 6/30/20 at 13:33;  Status DC


Phenylephrine HCl (Ekn-Synephrine Inj) 10 mg STK-MED ONCE .ROUTE ;  Start 

6/30/20 at 13:33;  Stop 6/30/20 at 13:33;  Status DC


Ondansetron HCl (Zofran) 4 mg STK-MED ONCE .ROUTE ;  Start 6/30/20 at 13:33;  

Stop 6/30/20 at 13:33;  Status DC


Enoxaparin Sodium (Lovenox 40mg Syringe) 40 mg Q24H SQ  Last administered on 7/ 14/20at 09:01;  Start 7/1/20 at 08:00


Sodium Chloride (Normal Saline Flush) 3 ml QSHIFT  PRN IV AFTER MEDS AND BLOOD 

DRAWS;  Start 6/30/20 at 14:45


Naloxone HCl (Narcan) 0.4 mg PRN Q2MIN  PRN IV SEE INSTRUCTIONS;  Start 6/30/20 

at 14:45


Sodium Chloride 1,000 ml @  25 mls/hr Q24H IV  Last administered on 7/14/20at 

14:33;  Start 6/30/20 at 14:33


Morphine Sulfate (Morphine Sulfate) 1 mg PRN Q1HR  PRN IV PAIN;  Start 6/30/20 

at 14:45


Midazolam HCl 100 mg/Sodium Chloride 100 ml @ 1 mls/hr CONT  PRN IV SEE I/O RE

CORD Last administered on 7/3/20at 18:48;  Start 6/30/20 at 14:45


Phenylephrine HCl (PHENYLEPHRINE in 0.9% NACL PF) 1 mg STK-MED ONCE IV ;  Start 

6/30/20 at 14:44;  Stop 6/30/20 at 14:45;  Status DC


Ephedrine Sulfate (ePHEDrine PF IN SALINE SYRINGE) 50 mg STK-MED ONCE IV ;  

Start 6/30/20 at 14:45;  Stop 6/30/20 at 14:45;  Status DC


Vasopressin 20 unit/Dextrose 101 ml @  12 mls/hr CONT  PRN IV SEE I/O RECORD 

Last administered on 7/7/20at 04:17;  Start 6/30/20 at 15:30


Sodium Chloride 1,000 ml @  1,000 mls/hr 1X  ONCE IV  Last administered on 

6/30/20at 15:42;  Start 6/30/20 at 15:45;  Stop 6/30/20 at 16:44;  Status DC


Albumin Human 500 ml @  125 mls/hr 1X  ONCE IV ;  Start 6/30/20 at 16:00;  Stop 

6/30/20 at 19:59;  Status DC


Albumin Human 500 ml @  125 mls/hr PRN Q1HR  PRN IV PER PROTOCOL;  Start 6/30/20

at 15:45


Magnesium Sulfate 50 ml @ 25 mls/hr 1X  ONCE IV  Last administered on 6/30/20at 

17:02;  Start 6/30/20 at 16:30;  Stop 6/30/20 at 18:29;  Status DC


Sodium Bicarbonate (Sodium Bicarb Adult 8.4% Syr) 50 meq STK-MED ONCE .ROUTE ;  

Start 6/30/20 at 16:20;  Stop 6/30/20 at 16:20;  Status DC


Sodium Bicarbonate (Sodium Bicarb Adult 8.4% Syr) 100 meq 1X  ONCE IV  Last 

administered on 6/30/20at 17:07;  Start 6/30/20 at 16:30;  Stop 6/30/20 at 

16:31;  Status DC


Sodium Bicarbonate 150 meq/Dextrose 1,150 ml @  75 mls/hr 1X  ONCE IV  Last 

administered on 6/30/20at 20:02;  Start 6/30/20 at 16:30;  Stop 7/1/20 at 07:49;

 Status DC


Sodium Chloride 80 meq/Potassium Chloride 30 meq/ Potassium Acetate 30 

meq/Magnesium Sulfate 14 meq/ Multivitamins 10 ml/Chromium/ Copper/Manganese/ 

Seleni/Zn 1 ml/ Insulin Human Regular 15 unit/ Total Parenteral Nutrition/Amino 

Acids/Dextrose/ Fat Emulsion Intravenous 1,920 ml @  80 mls/hr TPN  CONT IV  

Last administered on 7/1/20at 23:05;  Start 7/1/20 at 22:00;  Stop 7/2/20 at 

21:59;  Status DC


Sodium Chloride 100 meq/Potassium Chloride 30 meq/ Potassium Acetate 30 m

eq/Magnesium Sulfate 12 meq/ Multivitamins 10 ml/Chromium/ Copper/Manganese/ 

Seleni/Zn 1 ml/ Insulin Human Regular 15 unit/ Total Parenteral Nutrition/Amino 

Acids/Dextrose/ Fat Emulsion Intravenous 1,920 ml @  80 mls/hr TPN  CONT IV  

Last administered on 7/2/20at 21:52;  Start 7/2/20 at 22:00;  Stop 7/3/20 at 

21:59;  Status DC


Sodium Chloride 100 meq/Potassium Chloride 30 meq/ Potassium Acetate 30 

meq/Magnesium Sulfate 12 meq/ Multivitamins 10 ml/Chromium/ Copper/Manganese/ 

Seleni/Zn 1 ml/ Insulin Human Regular 15 unit/ Total Parenteral Nutrition/Amino 

Acids/Dextrose/ Fat Emulsion Intravenous 1,920 ml @  80 mls/hr TPN  CONT IV  

Last administered on 7/3/20at 21:46;  Start 7/3/20 at 22:00;  Stop 7/4/20 at 

21:59;  Status DC


Sodium Chloride 100 meq/Potassium Chloride 30 meq/ Potassium Acetate 30 

meq/Magnesium Sulfate 12 meq/ Multivitamins 10 ml/Chromium/ Copper/Manganese/ 

Seleni/Zn 1 ml/ Insulin Human Regular 15 unit/ Total Parenteral Nutrition/Amino 

Acids/Dextrose/ Fat Emulsion Intravenous 1,800 ml @  75 mls/hr TPN  CONT IV  

Last administered on 7/4/20at 22:04;  Start 7/4/20 at 22:00;  Stop 7/5/20 at 

21:59;  Status DC


Fentanyl Citrate 55 ml @ 0 mls/hr CONT  PRN IV SEE COMMENTS Last administered on

7/6/20at 23:55;  Start 7/4/20 at 13:00;  Stop 7/9/20 at 17:28;  Status DC


Sodium Chloride 100 meq/Potassium Chloride 30 meq/ Potassium Acetate 30 

meq/Magnesium Sulfate 12 meq/ Multivitamins 10 ml/Chromium/ Copper/Manganese/ 

Seleni/Zn 1 ml/ Insulin Human Regular 15 unit/ Total Parenteral Nutrition/Amino 

Acids/Dextrose/ Fat Emulsion Intravenous 1,680 ml @  70 mls/hr TPN  CONT IV  

Last administered on 7/5/20at 21:23;  Start 7/5/20 at 22:00;  Stop 7/6/20 at 

21:59;  Status DC


Sodium Chloride 110 meq/Potassium Chloride 30 meq/ Potassium Acetate 30 

meq/Magnesium Sulfate 15 meq/ Multivitamins 10 ml/Chromium/ Copper/Manganese/ 

Seleni/Zn 1 ml/ Insulin Human Regular 15 unit/ Total Parenteral Nutrition/Amino 

Acids/Dextrose/ Fat Emulsion Intravenous 1,680 ml @  70 mls/hr TPN  CONT IV  

Last administered on 7/6/20at 21:48;  Start 7/6/20 at 22:00;  Stop 7/7/20 at 

21:59;  Status DC


Sodium Chloride 110 meq/Potassium Chloride 30 meq/ Potassium Acetate 30 

meq/Magnesium Sulfate 15 meq/ Multivitamins 10 ml/Chromium/ Copper/Manganese/ 

Seleni/Zn 1 ml/ Insulin Human Regular 15 unit/ Total Parenteral Nutrition/Amino 

Acids/Dextrose/ Fat Emulsion Intravenous 1,680 ml @  70 mls/hr TPN  CONT IV  

Last administered on 7/7/20at 21:33;  Start 7/7/20 at 22:00;  Stop 7/8/20 at 

21:59;  Status DC


Sodium Chloride 110 meq/Potassium Chloride 30 meq/ Potassium Acetate 30 

meq/Magnesium Sulfate 15 meq/ Multivitamins 10 ml/Chromium/ Copper/Manganese/ 

Seleni/Zn 1 ml/ Insulin Human Regular 15 unit/ Total Parenteral Nutrition/Amino 

Acids/Dextrose/ Fat Emulsion Intravenous 1,680 ml @  70 mls/hr TPN  CONT IV  

Last administered on 7/8/20at 21:51;  Start 7/8/20 at 22:00;  Stop 7/9/20 at 

21:59;  Status DC


Sodium Chloride 90 meq/Potassium Chloride 30 meq/ Potassium Acetate 30 

meq/Magnesium Sulfate 15 meq/ Multivitamins 10 ml/Chromium/ Copper/Manganese/ 

Seleni/Zn 1 ml/ Insulin Human Regular 15 unit/ Total Parenteral Nutrition/Amino 

Acids/Dextrose/ Fat Emulsion Intravenous 1,680 ml @  70 mls/hr TPN  CONT IV  

Last administered on 7/9/20at 22:38;  Start 7/9/20 at 22:00;  Stop 7/10/20 at 

21:59;  Status DC


Fentanyl Citrate 30 ml @ 0 mls/hr CONT  PRN IV SEE I/O RECORD;  Start 7/9/20 at 

17:30


Fentanyl (Duragesic 12mcg/ Hr Patch) 1 patch Q3DAYS TD  Last administered on 

7/13/20at 10:01;  Start 7/10/20 at 09:00


Sodium Chloride 90 meq/Potassium Chloride 30 meq/ Potassium Acetate 30 

meq/Magnesium Sulfate 15 meq/ Multivitamins 10 ml/Chromium/ Copper/Manganese/ 

Seleni/Zn 1 ml/ Insulin Human Regular 15 unit/ Total Parenteral Nutrition/Amino 

Acids/Dextrose/ Fat Emulsion Intravenous 1,680 ml @  70 mls/hr TPN  CONT IV  

Last administered on 7/10/20at 21:59;  Start 7/10/20 at 22:00;  Stop 7/11/20 at 

21:59;  Status DC


Sodium Chloride 90 meq/Potassium Chloride 30 meq/ Potassium Acetate 30 

meq/Magnesium Sulfate 15 meq/ Multivitamins 10 ml/Chromium/ Copper/Manganese/ 

Seleni/Zn 1 ml/ Insulin Human Regular 15 unit/ Total Parenteral Nutrition/Amino 

Acids/Dextrose/ Fat Emulsion Intravenous 1,680 ml @  70 mls/hr TPN  CONT IV  

Last administered on 7/11/20at 21:35;  Start 7/11/20 at 22:00;  Stop 7/12/20 at 

21:59;  Status DC


Vancomycin HCl (Vanco Per Pharmacy) 1 each PRN DAILY  PRN MC SEE COMMENTS Last 

administered on 7/14/20at 02:46;  Start 7/12/20 at 09:15;  Stop 7/15/20 at 

07:41;  Status DC


Ciprofloxacin/ Dextrose 200 ml @  200 mls/hr Q12HR IV  Last administered on 

7/15/20at 08:58;  Start 7/12/20 at 10:00


Vancomycin HCl 2 gm/Sodium Chloride 500 ml @  250 mls/hr 1X  ONCE IV  Last 

administered on 7/12/20at 10:34;  Start 7/12/20 at 10:00;  Stop 7/12/20 at 

11:59;  Status DC


Sodium Chloride 90 meq/Potassium Chloride 30 meq/ Potassium Acetate 30 

meq/Magnesium Sulfate 15 meq/ Multivitamins 10 ml/Chromium/ Copper/Manganese/ 

Seleni/Zn 1 ml/ Insulin Human Regular 15 unit/ Total Parenteral Nutrition/Amino 

Acids/Dextrose/ Fat Emulsion Intravenous 1,680 ml @  70 mls/hr TPN  CONT IV  

Last administered on 7/12/20at 22:02;  Start 7/12/20 at 22:00;  Stop 7/13/20 at 

21:59;  Status DC


Diphenhydramine HCl (Benadryl) 25 mg 1X  ONCE IVP  Last administered on 7/12/ 20at 14:26;  Start 7/12/20 at 14:30;  Stop 7/12/20 at 14:31;  Status DC


Vancomycin HCl 1.5 gm/Sodium Chloride 500 ml @  250 mls/hr Q8H IV  Last 

administered on 7/13/20at 03:08;  Start 7/12/20 at 18:30;  Stop 7/13/20 at 

12:24;  Status DC


Vancomycin HCl (Vancomycin Trough Level) 1 each 1X  ONCE MC  Last administered 

on 7/13/20at 10:00;  Start 7/13/20 at 10:00;  Stop 7/13/20 at 10:01;  Status DC


Sodium Chloride 90 meq/Potassium Chloride 30 meq/ Potassium Acetate 30 

meq/Magnesium Sulfate 15 meq/ Multivitamins 10 ml/Chromium/ Copper/Manganese/ 

Seleni/Zn 1 ml/ Insulin Human Regular 15 unit/ Total Parenteral Nutrition/Amino 

Acids/Dextrose/ Fat Emulsion Intravenous 1,680 ml @  70 mls/hr TPN  CONT IV  

Last administered on 7/13/20at 22:13;  Start 7/13/20 at 22:00;  Stop 7/14/20 at 

21:59;  Status DC


Vancomycin HCl (Vancomycin Random Level) 1 each 1X  ONCE MC  Last administered 

on 7/14/20at 01:00;  Start 7/14/20 at 01:00;  Stop 7/14/20 at 01:01;  Status DC


Vancomycin HCl 1.5 gm/Sodium Chloride 500 ml @  250 mls/hr Q12H IV  Last 

administered on 7/14/20at 22:07;  Start 7/14/20 at 10:00;  Stop 7/15/20 at 

07:41;  Status DC


Vancomycin HCl (Vancomycin Trough Level) 1 each 1X  ONCE MC ;  Start 7/15/20 at 

09:30;  Stop 7/15/20 at 09:31;  Status Cancel


Sodium Chloride 90 meq/Potassium Chloride 30 meq/ Potassium Acetate 30 

meq/Magnesium Sulfate 15 meq/ Multivitamins 10 ml/Chromium/ Copper/Manganese/ 

Seleni/Zn 1 ml/ Insulin Human Regular 15 unit/ Total Parenteral Nutrition/Amino 

Acids/Dextrose/ Fat Emulsion Intravenous 1,680 ml @  70 mls/hr TPN  CONT IV  

Last administered on 7/14/20at 22:08;  Start 7/14/20 at 22:00;  Stop 7/15/20 at 

21:59


Alteplase, Recombinant (Cathflo For Central Catheter Clearance) 1 mg 1X  ONCE 

INT CAT  Last administered on 7/14/20at 11:49;  Start 7/14/20 at 11:00;  Stop 

7/14/20 at 11:01;  Status DC


Daptomycin 500 mg/ Sodium Chloride 50 ml @  100 mls/hr Q24H IV  Last 

administered on 7/15/20at 08:59;  Start 7/15/20 at 09:00


Sodium Chloride 90 meq/Potassium Chloride 30 meq/ Potassium Acetate 30 

meq/Magnesium Sulfate 15 meq/ Multivitamins 10 ml/Chromium/ Copper/Manganese/ 

Seleni/Zn 1 ml/ Insulin Human Regular 15 unit/ Total Parenteral Nutrition/Amino 

Acids/Dextrose/ Fat Emulsion Intravenous 1,680 ml @  70 mls/hr TPN  CONT IV ;  

Start 7/15/20 at 22:00;  Stop 7/16/20 at 21:59





Active Scripts


Active


Reported


Bisoprolol Fumarate 5 Mg Tablet 10 Mg PO DAILY


Vitals/I & O





Vital Sign - Last 24 Hours








 7/14/20 7/14/20 7/14/20 7/14/20





 13:50 14:00 15:00 16:00


 


Pulse  128 128 


 


Resp 35 33 34 


 


B/P (MAP)  138/83 (101) 132/75 (94) 


 


Pulse Ox 99 100 99 


 


O2 Delivery Tracheal Collar Tracheal Collar Tracheal Collar Trach Collar


 


O2 Flow Rate 8.0 8.0 8.0 8.0





 7/14/20 7/14/20 7/14/20 7/14/20





 16:00 16:14 17:00 18:00


 


Temp 101.5   





 101.5   


 


Pulse 126  130 125


 


Resp 30  36 34


 


B/P (MAP) 127/76 (93)  140/80 (100) 141/83 (102)


 


Pulse Ox 100 100 100 99


 


O2 Delivery Tracheal Collar Tracheal Collar Tracheal Collar Tracheal Collar


 


O2 Flow Rate 8.0 8.0 8.0 8.0


 


    





    





 7/14/20 7/14/20 7/14/20 7/14/20





 19:00 19:30 19:35 20:00


 


Temp    99.5





    99.5


 


Pulse 122   120


 


Resp 32   33


 


B/P (MAP) 116/62 (80)   121/68 (85)


 


Pulse Ox 98 100 100 100


 


O2 Delivery Tracheal Collar Tracheal Collar Tracheal Collar Tracheal Collar


 


O2 Flow Rate 8.0 8.0 8.0 8.0


 


    





    





 7/14/20 7/14/20 7/14/20 7/14/20





 20:00 21:00 22:00 23:00


 


Pulse  118 114 111


 


Resp  37 32 25


 


B/P (MAP)  119/73 (88) 124/71 (88) 134/83 (100)


 


Pulse Ox  96 99 100


 


O2 Delivery Trach Collar Tracheal Collar Tracheal Collar Tracheal Collar


 


O2 Flow Rate 8.0 8.0 8.0 8.0





 7/14/20 7/15/20 7/15/20 7/15/20





 23:20 00:00 00:00 01:00


 


Temp  98.8  





  98.8  


 


Pulse  119  126


 


Resp  24  26


 


B/P (MAP)  131/83 (99)  139/83 (101)


 


Pulse Ox 100 100  98


 


O2 Delivery Tracheal Collar Tracheal Collar Trach Collar Tracheal Collar


 


O2 Flow Rate 8.0 8.0 8.0 8.0


 


    





    





 7/15/20 7/15/20 7/15/20 7/15/20





 02:00 03:00 03:38 04:00


 


Temp    100.8





    100.8


 


Pulse 121 121  127


 


Resp 28 28  30


 


B/P (MAP) 128/87 (101) 141/84 (103)  151/81 (104)


 


Pulse Ox 100 100 100 93


 


O2 Delivery Tracheal Collar Tracheal Collar Tracheal Collar Tracheal Collar


 


O2 Flow Rate 8.0 8.0 8.0 8.0


 


    





    





 7/15/20 7/15/20 7/15/20 7/15/20





 04:00 05:00 06:00 07:19


 


Pulse  128 125 


 


Resp  28 31 


 


B/P (MAP)  133/73 (93) 141/81 (101) 


 


Pulse Ox  100 100 100


 


O2 Delivery Trach Collar Tracheal Collar Tracheal Collar Tracheal Collar


 


O2 Flow Rate 8.0 8.0 8.0 8.0





 7/15/20 7/15/20 7/15/20 





 08:00 11:55 12:00 


 


Pulse Ox  100  


 


O2 Delivery Trach Collar Tracheal Collar Trach Collar 


 


O2 Flow Rate 8.0 8.0 8.0 














Intake and Output   


 


 7/14/20 7/14/20 7/15/20





 15:00 23:00 07:00


 


Intake Total 1823.8 ml 379.4 ml 1476 ml


 


Output Total 640 ml 1468 ml 2130 ml


 


Balance 1183.8 ml -1088.6 ml -654 ml











Justicifation of Admission Dx:


Justifications for Admission:


Justification of Admission Dx:  Yes











CHEY TURNER MD              Jul 15, 2020 13:52

## 2020-07-15 NOTE — PDOC
G I PROGRESS NOTE


Subjective


Observed w/o entering room (MDRO/conserve PPE).  Waves.  Indicates "so-so".


Physical Exam


On trach shield.


Multiple abdominal drains.


Review of Relevant


I have reviewed the following items josy (where applicable) has been applied.


Labs





Laboratory Tests








Test


 7/13/20


18:03 7/14/20


00:50 7/14/20


00:53 7/14/20


05:40


 


Glucose (Fingerstick)


 110 mg/dL


(70-99) 


 120 mg/dL


(70-99) 





 


Random Vancomycin Level  20.6 mcg/mL   


 


Sodium Level


 


 


 


 136 mmol/L


(136-145)


 


Potassium Level


 


 


 


 4.0 mmol/L


(3.5-5.1)


 


Chloride Level


 


 


 


 102 mmol/L


()


 


Carbon Dioxide Level


 


 


 


 28 mmol/L


(21-32)


 


Anion Gap    6 (6-14) 


 


Blood Urea Nitrogen    9 mg/dL (7-20) 


 


Creatinine


 


 


 


 0.5 mg/dL


(0.6-1.0)


 


Estimated GFR


(Cockcroft-Gault) 


 


 


 131.1 





 


Glucose Level


 


 


 


 130 mg/dL


(70-99)


 


Calcium Level


 


 


 


 8.4 mg/dL


(8.5-10.1)


 


Test


 7/14/20


05:50 7/14/20


12:34 7/14/20


17:49 7/15/20


00:06


 


Glucose (Fingerstick)


 123 mg/dL


(70-99) 148 mg/dL


(70-99) 122 mg/dL


(70-99) 119 mg/dL


(70-99)


 


Test


 7/15/20


05:52 7/15/20


06:00 


 





 


Glucose (Fingerstick)


 116 mg/dL


(70-99) 


 


 





 


White Blood Count


 


 10.1 x10^3/uL


(4.0-11.0) 


 





 


Red Blood Count


 


 2.44 x10^6/uL


(3.50-5.40) 


 





 


Hemoglobin


 


 7.0 g/dL


(12.0-15.5) 


 





 


Hematocrit


 


 21.2 %


(36.0-47.0) 


 





 


Mean Corpuscular Volume  87 fL ()   


 


Mean Corpuscular Hemoglobin  29 pg (25-35)   


 


Mean Corpuscular Hemoglobin


Concent 


 33 g/dL


(31-37) 


 





 


Red Cell Distribution Width


 


 14.8 %


(11.5-14.5) 


 





 


Platelet Count


 


 551 x10^3/uL


(140-400) 


 





 


Neutrophils (%) (Auto)  77 % (31-73)   


 


Lymphocytes (%) (Auto)  10 % (24-48)   


 


Monocytes (%) (Auto)  10 % (0-9)   


 


Eosinophils (%) (Auto)  3 % (0-3)   


 


Basophils (%) (Auto)  0 % (0-3)   


 


Neutrophils # (Auto)


 


 7.8 x10^3/uL


(1.8-7.7) 


 





 


Lymphocytes # (Auto)


 


 1.0 x10^3/uL


(1.0-4.8) 


 





 


Monocytes # (Auto)


 


 1.0 x10^3/uL


(0.0-1.1) 


 





 


Eosinophils # (Auto)


 


 0.3 x10^3/uL


(0.0-0.7) 


 





 


Basophils # (Auto)


 


 0.0 x10^3/uL


(0.0-0.2) 


 





 


Sodium Level


 


 135 mmol/L


(136-145) 


 





 


Potassium Level


 


 4.1 mmol/L


(3.5-5.1) 


 





 


Chloride Level


 


 102 mmol/L


() 


 





 


Carbon Dioxide Level


 


 30 mmol/L


(21-32) 


 





 


Anion Gap  3 (6-14)   


 


Blood Urea Nitrogen  9 mg/dL (7-20)   


 


Creatinine


 


 0.5 mg/dL


(0.6-1.0) 


 





 


Estimated GFR


(Cockcroft-Gault) 


 131.1 


 


 





 


Glucose Level


 


 126 mg/dL


(70-99) 


 





 


Calcium Level


 


 8.7 mg/dL


(8.5-10.1) 


 











Laboratory Tests








Test


 7/14/20


12:34 7/14/20


17:49 7/15/20


00:06 7/15/20


05:52


 


Glucose (Fingerstick)


 148 mg/dL


(70-99) 122 mg/dL


(70-99) 119 mg/dL


(70-99) 116 mg/dL


(70-99)


 


Test


 7/15/20


06:00 


 


 





 


White Blood Count


 10.1 x10^3/uL


(4.0-11.0) 


 


 





 


Red Blood Count


 2.44 x10^6/uL


(3.50-5.40) 


 


 





 


Hemoglobin


 7.0 g/dL


(12.0-15.5) 


 


 





 


Hematocrit


 21.2 %


(36.0-47.0) 


 


 





 


Mean Corpuscular Volume 87 fL ()    


 


Mean Corpuscular Hemoglobin 29 pg (25-35)    


 


Mean Corpuscular Hemoglobin


Concent 33 g/dL


(31-37) 


 


 





 


Red Cell Distribution Width


 14.8 %


(11.5-14.5) 


 


 





 


Platelet Count


 551 x10^3/uL


(140-400) 


 


 





 


Neutrophils (%) (Auto) 77 % (31-73)    


 


Lymphocytes (%) (Auto) 10 % (24-48)    


 


Monocytes (%) (Auto) 10 % (0-9)    


 


Eosinophils (%) (Auto) 3 % (0-3)    


 


Basophils (%) (Auto) 0 % (0-3)    


 


Neutrophils # (Auto)


 7.8 x10^3/uL


(1.8-7.7) 


 


 





 


Lymphocytes # (Auto)


 1.0 x10^3/uL


(1.0-4.8) 


 


 





 


Monocytes # (Auto)


 1.0 x10^3/uL


(0.0-1.1) 


 


 





 


Eosinophils # (Auto)


 0.3 x10^3/uL


(0.0-0.7) 


 


 





 


Basophils # (Auto)


 0.0 x10^3/uL


(0.0-0.2) 


 


 





 


Sodium Level


 135 mmol/L


(136-145) 


 


 





 


Potassium Level


 4.1 mmol/L


(3.5-5.1) 


 


 





 


Chloride Level


 102 mmol/L


() 


 


 





 


Carbon Dioxide Level


 30 mmol/L


(21-32) 


 


 





 


Anion Gap 3 (6-14)    


 


Blood Urea Nitrogen 9 mg/dL (7-20)    


 


Creatinine


 0.5 mg/dL


(0.6-1.0) 


 


 





 


Estimated GFR


(Cockcroft-Gault) 131.1 


 


 


 





 


Glucose Level


 126 mg/dL


(70-99) 


 


 





 


Calcium Level


 8.7 mg/dL


(8.5-10.1) 


 


 











Microbiology


7/12/20 Gram Stain Evaluation - Final, Complete


          


7/12/20 Respiratory Culture - Final, Complete


          


7/12/20 Antimicrobic Susceptibility - Final, Complete


          


7/12/20 Blood Culture - Preliminary, Resulted


          NO GROWTH AFTER 3 DAYS


6/30/20 Gram Stain - Final, Complete


          


6/30/20 Aerobic and Anaerobic Culture - Final, Complete


          


6/30/20 Antimicrobic Susceptibility - Final, Complete


          


6/15/20 Gram Stain - Final, Complete


          


6/15/20 Aerobic and Anaerobic Culture - Final, Complete


          


6/7/20 Urine Culture - Final, Complete


         


5/30/20 Gram Stain - Final, Complete


          


5/30/20 Aerobic Culture - Final, Complete


Vitals/I & O





Vital Sign - Last 24 Hours








 7/14/20 7/14/20 7/14/20 7/14/20





 12:00 12:00 12:44 13:00


 


Temp 98.9   





 98.9   


 


Pulse 96   133


 


Resp 41   34


 


B/P (MAP) 131/85 (100)   131/89 (103)


 


Pulse Ox 96  100 96


 


O2 Delivery Tracheal Collar Trach Collar Tracheal Collar Tracheal Collar


 


O2 Flow Rate 8.0 8.0 8.0 8.0


 


    





    





 7/14/20 7/14/20 7/14/20 7/14/20





 13:08 13:50 14:00 15:00


 


Pulse   128 128


 


Resp 37 35 33 34


 


B/P (MAP)   138/83 (101) 132/75 (94)


 


Pulse Ox 100 99 100 99


 


O2 Delivery  Tracheal Collar Tracheal Collar Tracheal Collar


 


O2 Flow Rate  8.0 8.0 8.0





 7/14/20 7/14/20 7/14/20 7/14/20





 16:00 16:00 16:14 17:00


 


Temp  101.5  





  101.5  


 


Pulse  126  130


 


Resp  30  36


 


B/P (MAP)  127/76 (93)  140/80 (100)


 


Pulse Ox  100 100 100


 


O2 Delivery Trach Collar Tracheal Collar Tracheal Collar Tracheal Collar


 


O2 Flow Rate 8.0 8.0 8.0 8.0


 


    





    





 7/14/20 7/14/20 7/14/20 7/14/20





 18:00 19:00 19:30 19:35


 


Pulse 125 122  


 


Resp 34 32  


 


B/P (MAP) 141/83 (102) 116/62 (80)  


 


Pulse Ox 99 98 100 100


 


O2 Delivery Tracheal Collar Tracheal Collar Tracheal Collar Tracheal Collar


 


O2 Flow Rate 8.0 8.0 8.0 8.0





 7/14/20 7/14/20 7/14/20 7/14/20





 20:00 20:00 21:00 22:00


 


Temp 99.5   





 99.5   


 


Pulse 120  118 114


 


Resp 33  37 32


 


B/P (MAP) 121/68 (85)  119/73 (88) 124/71 (88)


 


Pulse Ox 100  96 99


 


O2 Delivery Tracheal Collar Trach Collar Tracheal Collar Tracheal Collar


 


O2 Flow Rate 8.0 8.0 8.0 8.0


 


    





    





 7/14/20 7/14/20 7/15/20 7/15/20





 23:00 23:20 00:00 00:00


 


Temp   98.8 





   98.8 


 


Pulse 111  119 


 


Resp 25  24 


 


B/P (MAP) 134/83 (100)  131/83 (99) 


 


Pulse Ox 100 100 100 


 


O2 Delivery Tracheal Collar Tracheal Collar Tracheal Collar Trach Collar


 


O2 Flow Rate 8.0 8.0 8.0 8.0


 


    





    





 7/15/20 7/15/20 7/15/20 7/15/20





 01:00 02:00 03:00 03:38


 


Pulse 126 121 121 


 


Resp 26 28 28 


 


B/P (MAP) 139/83 (101) 128/87 (101) 141/84 (103) 


 


Pulse Ox 98 100 100 100


 


O2 Delivery Tracheal Collar Tracheal Collar Tracheal Collar Tracheal Collar


 


O2 Flow Rate 8.0 8.0 8.0 8.0





 7/15/20 7/15/20 7/15/20 7/15/20





 04:00 04:00 05:00 06:00


 


Temp 100.8   





 100.8   


 


Pulse 127  128 125


 


Resp 30  28 31


 


B/P (MAP) 151/81 (104)  133/73 (93) 141/81 (101)


 


Pulse Ox 93  100 100


 


O2 Delivery Tracheal Collar Trach Collar Tracheal Collar Tracheal Collar


 


O2 Flow Rate 8.0 8.0 8.0 8.0


 


    





    





 7/15/20   





 07:19   


 


Pulse Ox 100   


 


O2 Delivery Tracheal Collar   


 


O2 Flow Rate 8.0   














Intake and Output   


 


 7/14/20 7/14/20 7/15/20





 15:00 23:00 07:00


 


Intake Total 1823.8 ml 379.4 ml 1476 ml


 


Output Total 640 ml 1468 ml 2130 ml


 


Balance 1183.8 ml -1088.6 ml -654 ml








Problem List


Problems


Medical Problems:


(1) Acute pancreatitis


Status: Acute  





(2) Cholelithiasis


Status: Acute  





Assessment


Biliary pancreatitis, complicated, s/p operative inteventions.  Stable with 

continuing issues.


Plan of Care:  Continue current Tx, Mgmt





Justicifation of Admission Dx:


Justifications for Admission:


Justification of Admission Dx:  Yes











MARCO ANTONIO LONGO MD          Jul 15, 2020 11:10

## 2020-07-15 NOTE — PDOC
Infectious Disease Note


Subjective


Subjective


Patient is awake on a ventilator through trach





ROS


ROS


nausea +, no vomiting


does have fever





Vital Sign


Vital Signs





Vital Signs








  Date Time  Temp Pulse Resp B/P (MAP) Pulse Ox O2 Delivery O2 Flow Rate FiO2


 


7/15/20 06:00  125 31 141/81 (101) 100 Tracheal Collar 8.0 


 


7/15/20 04:00 100.8       





 100.8       











Physical Exam


PHYSICAL EXAM


GENERAL: Propped up in bed, awake, weak appearing 


HEENT: Pupils equal, oral cavity dry. NGT out, on vent


NECK:  Tracheostomy 


LUNGS: Diminished aeration bases,  CT on left 


HEART:  S1, S2, regular, tachy 


ABDOMEN: Sightly less distended, bowel sounds hypoactive, soft, richardson x 2, 3 ROBERT

drains, G-J tube and + wound vac 


: [place] in place 


EXTREMITIES: Generalized edema, no cyanosis. SCDs & Podus boots bilaterally, 


SKIN: warm touch. No signs of rash.  


NEURO: awake, mouthing some words, tracking 


LUE-PICC without signs of complications 


LUE art-line out, mottling about old art-line site is improving. RP palpable, 

cap refill brisk.





Labs


Lab





Laboratory Tests








Test


 7/14/20


12:34 7/14/20


17:49 7/15/20


00:06 7/15/20


05:52


 


Glucose (Fingerstick)


 148 mg/dL


(70-99) 122 mg/dL


(70-99) 119 mg/dL


(70-99) 116 mg/dL


(70-99)


 


Test


 7/15/20


06:00 


 


 





 


White Blood Count


 10.1 x10^3/uL


(4.0-11.0) 


 


 





 


Red Blood Count


 2.44 x10^6/uL


(3.50-5.40) 


 


 





 


Hemoglobin


 7.0 g/dL


(12.0-15.5) 


 


 





 


Hematocrit


 21.2 %


(36.0-47.0) 


 


 





 


Mean Corpuscular Volume 87 fL ()    


 


Mean Corpuscular Hemoglobin 29 pg (25-35)    


 


Mean Corpuscular Hemoglobin


Concent 33 g/dL


(31-37) 


 


 





 


Red Cell Distribution Width


 14.8 %


(11.5-14.5) 


 


 





 


Platelet Count


 551 x10^3/uL


(140-400) 


 


 





 


Neutrophils (%) (Auto) 77 % (31-73)    


 


Lymphocytes (%) (Auto) 10 % (24-48)    


 


Monocytes (%) (Auto) 10 % (0-9)    


 


Eosinophils (%) (Auto) 3 % (0-3)    


 


Basophils (%) (Auto) 0 % (0-3)    


 


Neutrophils # (Auto)


 7.8 x10^3/uL


(1.8-7.7) 


 


 





 


Lymphocytes # (Auto)


 1.0 x10^3/uL


(1.0-4.8) 


 


 





 


Monocytes # (Auto)


 1.0 x10^3/uL


(0.0-1.1) 


 


 





 


Eosinophils # (Auto)


 0.3 x10^3/uL


(0.0-0.7) 


 


 





 


Basophils # (Auto)


 0.0 x10^3/uL


(0.0-0.2) 


 


 





 


Sodium Level


 135 mmol/L


(136-145) 


 


 





 


Potassium Level


 4.1 mmol/L


(3.5-5.1) 


 


 





 


Chloride Level


 102 mmol/L


() 


 


 





 


Carbon Dioxide Level


 30 mmol/L


(21-32) 


 


 





 


Anion Gap 3 (6-14)    


 


Blood Urea Nitrogen 9 mg/dL (7-20)    


 


Creatinine


 0.5 mg/dL


(0.6-1.0) 


 


 





 


Estimated GFR


(Cockcroft-Gault) 131.1 


 


 


 





 


Glucose Level


 126 mg/dL


(70-99) 


 


 





 


Calcium Level


 8.7 mg/dL


(8.5-10.1) 


 


 











Micro








Objective


Assessment


Patient with prolonged hospitalization more than 3 months


Multiple medical problems


Multiple surgical procedures





S/P Exp. 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 

parapsilosis & PSAE (I-merrem/ceftazidime/AZT/cefepime))


Leucocytosis -trending upward 


Fever 


Acute gallstone pancreatitis with persistent necrosis


  - 4/9.  CT A/P Increased ascites. Persistent evidence of necrotizing 

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


  - 4/27. status post ROBERT drain placement; C. parapsilosis. s/p drain 5/6 + yeast

& high amylase; s/p additional drain on 5/8. Drains removed. 


  -5/6. fluid  candida parapsilosis fluid, amylase high


  - 6/6 showed multiple pseudocysts, slight larger on the right. s/p drains x 3,

6/7.  + PSAE (MDRO-R Cefepime, Zosyn ANSON < 64) and yeast, 


  -6/7 s/p drain replacement x 3; fluid cult PSAE (MDRO), yeast; treated


  -7/12 CT A/P shows smaller fluid collections.         


Ascites s/p paracentesis 4/15 & 5/6. C. parapsilosis 


Cholelithiasis with thickening of the gallbladder wall.


JED, Hyperkalemia, Metabolic acidosis off dialysis


Acute hypoxic resp failure. trach/vent. sputum 6/13  + PSAE (I merrem) 


Pleural effusions s/p left thoracentesis, 5/12. no culture. s/p left chest tube,

6/15 no growth


Hypocalcemia 


Prediabetes


HTN


Anemia s/p PRBCs





Plan


Plan of Care


Meropenem, cipro , micafungin and dapto


repeat bc


Labs in am


wound care /drain management as directed


Contact isolation for CRE/MDRO


D/w nursing 





Critically ill








 long term prognosis poor











LINN FRANZ MD               Jul 15, 2020 07:28

## 2020-07-15 NOTE — PDOC
SURGICAL PROGRESS NOTE


Subjective


awake


d/w nurse--temp, increased respiratory rate today


Vital Signs





Vital Signs








  Date Time  Temp Pulse Resp B/P (MAP) Pulse Ox O2 Delivery O2 Flow Rate FiO2


 


7/15/20 07:19     100 Tracheal Collar 8.0 


 


7/15/20 06:00  125 31 141/81 (101)    


 


7/15/20 04:00 100.8       





 100.8       








I&O











Intake and Output 


 


 7/15/20





 06:59


 


Intake Total 3679.2 ml


 


Output Total 4238 ml


 


Balance -558.8 ml


 


 


 


IV Total 3679.2 ml


 


Tube Feeding 0 ml


 


Output Urine Total 2285 ml


 


Chest Tube Drainage Total 440 ml


 


Drainage Total 1513 ml








PATIENT HAS A VILLASENOR:  Yes


General:  Cooperative, No acute distress


HEENT:  Other (trach)


Abdomen:  Soft, Other (mulitple drains in place)


Labs





Laboratory Tests








Test


 7/13/20


10:00 7/13/20


18:03 7/14/20


00:50 7/14/20


00:53


 


Vancomycin Level Trough


 25.0 mcg/mL


(10.0-20.0) 


 


 





 


Vancomycin Last Dose Date 07-13-20    


 


Vancomycin Last Dose Time 0230    


 


Glucose (Fingerstick)


 


 110 mg/dL


(70-99) 


 120 mg/dL


(70-99)


 


Random Vancomycin Level   20.6 mcg/mL  


 


Test


 7/14/20


05:40 7/14/20


05:50 7/14/20


12:34 7/14/20


17:49


 


Sodium Level


 136 mmol/L


(136-145) 


 


 





 


Potassium Level


 4.0 mmol/L


(3.5-5.1) 


 


 





 


Chloride Level


 102 mmol/L


() 


 


 





 


Carbon Dioxide Level


 28 mmol/L


(21-32) 


 


 





 


Anion Gap 6 (6-14)    


 


Blood Urea Nitrogen 9 mg/dL (7-20)    


 


Creatinine


 0.5 mg/dL


(0.6-1.0) 


 


 





 


Estimated GFR


(Cockcroft-Gault) 131.1 


 


 


 





 


Glucose Level


 130 mg/dL


(70-99) 


 


 





 


Calcium Level


 8.4 mg/dL


(8.5-10.1) 


 


 





 


Glucose (Fingerstick)


 


 123 mg/dL


(70-99) 148 mg/dL


(70-99) 122 mg/dL


(70-99)


 


Test


 7/15/20


00:06 7/15/20


05:52 7/15/20


06:00 





 


Glucose (Fingerstick)


 119 mg/dL


(70-99) 116 mg/dL


(70-99) 


 





 


White Blood Count


 


 


 10.1 x10^3/uL


(4.0-11.0) 





 


Red Blood Count


 


 


 2.44 x10^6/uL


(3.50-5.40) 





 


Hemoglobin


 


 


 7.0 g/dL


(12.0-15.5) 





 


Hematocrit


 


 


 21.2 %


(36.0-47.0) 





 


Mean Corpuscular Volume   87 fL ()  


 


Mean Corpuscular Hemoglobin   29 pg (25-35)  


 


Mean Corpuscular Hemoglobin


Concent 


 


 33 g/dL


(31-37) 





 


Red Cell Distribution Width


 


 


 14.8 %


(11.5-14.5) 





 


Platelet Count


 


 


 551 x10^3/uL


(140-400) 





 


Neutrophils (%) (Auto)   77 % (31-73)  


 


Lymphocytes (%) (Auto)   10 % (24-48)  


 


Monocytes (%) (Auto)   10 % (0-9)  


 


Eosinophils (%) (Auto)   3 % (0-3)  


 


Basophils (%) (Auto)   0 % (0-3)  


 


Neutrophils # (Auto)


 


 


 7.8 x10^3/uL


(1.8-7.7) 





 


Lymphocytes # (Auto)


 


 


 1.0 x10^3/uL


(1.0-4.8) 





 


Monocytes # (Auto)


 


 


 1.0 x10^3/uL


(0.0-1.1) 





 


Eosinophils # (Auto)


 


 


 0.3 x10^3/uL


(0.0-0.7) 





 


Basophils # (Auto)


 


 


 0.0 x10^3/uL


(0.0-0.2) 





 


Sodium Level


 


 


 135 mmol/L


(136-145) 





 


Potassium Level


 


 


 4.1 mmol/L


(3.5-5.1) 





 


Chloride Level


 


 


 102 mmol/L


() 





 


Carbon Dioxide Level


 


 


 30 mmol/L


(21-32) 





 


Anion Gap   3 (6-14)  


 


Blood Urea Nitrogen   9 mg/dL (7-20)  


 


Creatinine


 


 


 0.5 mg/dL


(0.6-1.0) 





 


Estimated GFR


(Cockcroft-Gault) 


 


 131.1 


 





 


Glucose Level


 


 


 126 mg/dL


(70-99) 





 


Calcium Level


 


 


 8.7 mg/dL


(8.5-10.1) 











Laboratory Tests








Test


 7/14/20


12:34 7/14/20


17:49 7/15/20


00:06 7/15/20


05:52


 


Glucose (Fingerstick)


 148 mg/dL


(70-99) 122 mg/dL


(70-99) 119 mg/dL


(70-99) 116 mg/dL


(70-99)


 


Test


 7/15/20


06:00 


 


 





 


White Blood Count


 10.1 x10^3/uL


(4.0-11.0) 


 


 





 


Red Blood Count


 2.44 x10^6/uL


(3.50-5.40) 


 


 





 


Hemoglobin


 7.0 g/dL


(12.0-15.5) 


 


 





 


Hematocrit


 21.2 %


(36.0-47.0) 


 


 





 


Mean Corpuscular Volume 87 fL ()    


 


Mean Corpuscular Hemoglobin 29 pg (25-35)    


 


Mean Corpuscular Hemoglobin


Concent 33 g/dL


(31-37) 


 


 





 


Red Cell Distribution Width


 14.8 %


(11.5-14.5) 


 


 





 


Platelet Count


 551 x10^3/uL


(140-400) 


 


 





 


Neutrophils (%) (Auto) 77 % (31-73)    


 


Lymphocytes (%) (Auto) 10 % (24-48)    


 


Monocytes (%) (Auto) 10 % (0-9)    


 


Eosinophils (%) (Auto) 3 % (0-3)    


 


Basophils (%) (Auto) 0 % (0-3)    


 


Neutrophils # (Auto)


 7.8 x10^3/uL


(1.8-7.7) 


 


 





 


Lymphocytes # (Auto)


 1.0 x10^3/uL


(1.0-4.8) 


 


 





 


Monocytes # (Auto)


 1.0 x10^3/uL


(0.0-1.1) 


 


 





 


Eosinophils # (Auto)


 0.3 x10^3/uL


(0.0-0.7) 


 


 





 


Basophils # (Auto)


 0.0 x10^3/uL


(0.0-0.2) 


 


 





 


Sodium Level


 135 mmol/L


(136-145) 


 


 





 


Potassium Level


 4.1 mmol/L


(3.5-5.1) 


 


 





 


Chloride Level


 102 mmol/L


() 


 


 





 


Carbon Dioxide Level


 30 mmol/L


(21-32) 


 


 





 


Anion Gap 3 (6-14)    


 


Blood Urea Nitrogen 9 mg/dL (7-20)    


 


Creatinine


 0.5 mg/dL


(0.6-1.0) 


 


 





 


Estimated GFR


(Cockcroft-Gault) 131.1 


 


 


 





 


Glucose Level


 126 mg/dL


(70-99) 


 


 





 


Calcium Level


 8.7 mg/dL


(8.5-10.1) 


 


 











Problem List


Problems


Medical Problems:


(1) Acute pancreatitis


Status: Acute  





(2) Cholelithiasis


Status: Acute  








Assessment/Plan


supportive care


BC pending





Justicifation of Admission Dx:


Justifications for Admission:


Justification of Admission Dx:  Yes











SAURAV NASH APRN            Jul 15, 2020 08:38

## 2020-07-15 NOTE — NUR
Wound Care



Assisted pt back to bed from recliner x3 assist. Incisional wound vac changed over the 
midline abdomen, penrose drain in place with scant drainage at distal opening of incision. 
Incision line well approximated and mostly epithelialized. Periwound draped, contact layer 
placed over incision line with silver foam at -125 mmHg continuous suction. All drain 
dressings removed, cleaned with chloroprep and new split gauze sponges reapplied and taped, 
pt tolerated dressing changes well. Spoke with JAKUB Arango re: RLQ drain site oozing purulent 
drainage, Conchita stated Dr. Davis was aware. JAKBU Arango and [place], at bedside to reposition and 
change sheets, no wounds noted. WC will continue to follow for vac management.

## 2020-07-15 NOTE — NUR
SS following up with discharge planning. SS reviewed pt chart and discussed with pt RN. Pt 
is currently on trach collar and TPN. Pt on IV Cipro, Daptomycin, Micafungin, and Meropenem. 
Pt has J tube and chest tube and all drains remain. Pt had temp today. Repeat blood cultures 
pending. Pt is dependent with PT/OT and now up in chair. SS will continue to follow for 
discharge planning.

## 2020-07-15 NOTE — PDOC
PULMONARY PROGRESS NOTES


Subjective


Patient with no significant respiratory distress currently on trach shield


Vitals





Vital Signs








  Date Time  Temp Pulse Resp B/P (MAP) Pulse Ox O2 Delivery O2 Flow Rate FiO2


 


7/15/20 07:19     100 Tracheal Collar 8.0 


 


7/15/20 06:00  125 31 141/81 (101)    


 


7/15/20 04:00 100.8       





 100.8       








ROS:  No Nausea, No Chest Pain, No Increase Cough


General:  Alert


HEENT:  Other (trach site ok)


Lungs:  Crackles


Cardiovascular:  S1, S2


Abdomen:  Soft, Non-tender, Other (multiple ROBERT drains )


Neuro Exam:  Alert


Extremities:  Other (+1 BLE edema)


Skin:  Warm


Labs





Laboratory Tests








Test


 7/13/20


10:00 7/13/20


18:03 7/14/20


00:50 7/14/20


00:53


 


Vancomycin Level Trough


 25.0 mcg/mL


(10.0-20.0) 


 


 





 


Vancomycin Last Dose Date 07-13-20    


 


Vancomycin Last Dose Time 0230    


 


Glucose (Fingerstick)


 


 110 mg/dL


(70-99) 


 120 mg/dL


(70-99)


 


Random Vancomycin Level   20.6 mcg/mL  


 


Test


 7/14/20


05:40 7/14/20


05:50 7/14/20


12:34 7/14/20


17:49


 


Sodium Level


 136 mmol/L


(136-145) 


 


 





 


Potassium Level


 4.0 mmol/L


(3.5-5.1) 


 


 





 


Chloride Level


 102 mmol/L


() 


 


 





 


Carbon Dioxide Level


 28 mmol/L


(21-32) 


 


 





 


Anion Gap 6 (6-14)    


 


Blood Urea Nitrogen 9 mg/dL (7-20)    


 


Creatinine


 0.5 mg/dL


(0.6-1.0) 


 


 





 


Estimated GFR


(Cockcroft-Gault) 131.1 


 


 


 





 


Glucose Level


 130 mg/dL


(70-99) 


 


 





 


Calcium Level


 8.4 mg/dL


(8.5-10.1) 


 


 





 


Glucose (Fingerstick)


 


 123 mg/dL


(70-99) 148 mg/dL


(70-99) 122 mg/dL


(70-99)


 


Test


 7/15/20


00:06 7/15/20


05:52 7/15/20


06:00 





 


Glucose (Fingerstick)


 119 mg/dL


(70-99) 116 mg/dL


(70-99) 


 





 


White Blood Count


 


 


 10.1 x10^3/uL


(4.0-11.0) 





 


Red Blood Count


 


 


 2.44 x10^6/uL


(3.50-5.40) 





 


Hemoglobin


 


 


 7.0 g/dL


(12.0-15.5) 





 


Hematocrit


 


 


 21.2 %


(36.0-47.0) 





 


Mean Corpuscular Volume   87 fL ()  


 


Mean Corpuscular Hemoglobin   29 pg (25-35)  


 


Mean Corpuscular Hemoglobin


Concent 


 


 33 g/dL


(31-37) 





 


Red Cell Distribution Width


 


 


 14.8 %


(11.5-14.5) 





 


Platelet Count


 


 


 551 x10^3/uL


(140-400) 





 


Neutrophils (%) (Auto)   77 % (31-73)  


 


Lymphocytes (%) (Auto)   10 % (24-48)  


 


Monocytes (%) (Auto)   10 % (0-9)  


 


Eosinophils (%) (Auto)   3 % (0-3)  


 


Basophils (%) (Auto)   0 % (0-3)  


 


Neutrophils # (Auto)


 


 


 7.8 x10^3/uL


(1.8-7.7) 





 


Lymphocytes # (Auto)


 


 


 1.0 x10^3/uL


(1.0-4.8) 





 


Monocytes # (Auto)


 


 


 1.0 x10^3/uL


(0.0-1.1) 





 


Eosinophils # (Auto)


 


 


 0.3 x10^3/uL


(0.0-0.7) 





 


Basophils # (Auto)


 


 


 0.0 x10^3/uL


(0.0-0.2) 





 


Sodium Level


 


 


 135 mmol/L


(136-145) 





 


Potassium Level


 


 


 4.1 mmol/L


(3.5-5.1) 





 


Chloride Level


 


 


 102 mmol/L


() 





 


Carbon Dioxide Level


 


 


 30 mmol/L


(21-32) 





 


Anion Gap   3 (6-14)  


 


Blood Urea Nitrogen   9 mg/dL (7-20)  


 


Creatinine


 


 


 0.5 mg/dL


(0.6-1.0) 





 


Estimated GFR


(Cockcroft-Gault) 


 


 131.1 


 





 


Glucose Level


 


 


 126 mg/dL


(70-99) 





 


Calcium Level


 


 


 8.7 mg/dL


(8.5-10.1) 











Laboratory Tests








Test


 7/14/20


12:34 7/14/20


17:49 7/15/20


00:06 7/15/20


05:52


 


Glucose (Fingerstick)


 148 mg/dL


(70-99) 122 mg/dL


(70-99) 119 mg/dL


(70-99) 116 mg/dL


(70-99)


 


Test


 7/15/20


06:00 


 


 





 


White Blood Count


 10.1 x10^3/uL


(4.0-11.0) 


 


 





 


Red Blood Count


 2.44 x10^6/uL


(3.50-5.40) 


 


 





 


Hemoglobin


 7.0 g/dL


(12.0-15.5) 


 


 





 


Hematocrit


 21.2 %


(36.0-47.0) 


 


 





 


Mean Corpuscular Volume 87 fL ()    


 


Mean Corpuscular Hemoglobin 29 pg (25-35)    


 


Mean Corpuscular Hemoglobin


Concent 33 g/dL


(31-37) 


 


 





 


Red Cell Distribution Width


 14.8 %


(11.5-14.5) 


 


 





 


Platelet Count


 551 x10^3/uL


(140-400) 


 


 





 


Neutrophils (%) (Auto) 77 % (31-73)    


 


Lymphocytes (%) (Auto) 10 % (24-48)    


 


Monocytes (%) (Auto) 10 % (0-9)    


 


Eosinophils (%) (Auto) 3 % (0-3)    


 


Basophils (%) (Auto) 0 % (0-3)    


 


Neutrophils # (Auto)


 7.8 x10^3/uL


(1.8-7.7) 


 


 





 


Lymphocytes # (Auto)


 1.0 x10^3/uL


(1.0-4.8) 


 


 





 


Monocytes # (Auto)


 1.0 x10^3/uL


(0.0-1.1) 


 


 





 


Eosinophils # (Auto)


 0.3 x10^3/uL


(0.0-0.7) 


 


 





 


Basophils # (Auto)


 0.0 x10^3/uL


(0.0-0.2) 


 


 





 


Sodium Level


 135 mmol/L


(136-145) 


 


 





 


Potassium Level


 4.1 mmol/L


(3.5-5.1) 


 


 





 


Chloride Level


 102 mmol/L


() 


 


 





 


Carbon Dioxide Level


 30 mmol/L


(21-32) 


 


 





 


Anion Gap 3 (6-14)    


 


Blood Urea Nitrogen 9 mg/dL (7-20)    


 


Creatinine


 0.5 mg/dL


(0.6-1.0) 


 


 





 


Estimated GFR


(Cockcroft-Gault) 131.1 


 


 


 





 


Glucose Level


 126 mg/dL


(70-99) 


 


 





 


Calcium Level


 8.7 mg/dL


(8.5-10.1) 


 


 











Medications





Active Scripts








 Medications  Dose


 Route/Sig


 Max Daily Dose Days Date Category


 


 Bisoprolol


 Fumarate 5 Mg


 Tablet  10 Mg


 PO DAILY


   3/16/20 Reported








Comments


ct reviewed 7/12/20, Decreased left-sided effusion after catheter placement. The




right-sided effusion has increased as has atelectasis.


 





 


There has been exchange or placement of multiple drainage 


tubes and a gastrojejunostomy tube. Both collections are smaller. No 


significant new abdominal fluid collection is seen. The jejunal component 


of the gastrojejunostomy tube appears to be looped in the proximal small 


bowel. 











ct abdomen /pelvis 6/6


1. Removal of the percutaneous pigtail drainage catheters since the prior 


exam. Sequela of pancreatitis with extensive pseudocysts again 


demonstrated, the right-sided collections are slightly larger since the 


prior exam, the left-sided collections are stable. See above.


2. Moderate to large left pleural effusion with atelectasis and collapse 


of most of the left lower lobe, stable. Small right pleural effusion is 


stable.


3. Gallstone.





ct chest 6/15 reviewed








 GRAM NEG COCCOBACILLI:MANY


        SQUAMOUS EPI CELL:RARE


        PMN (WBCs):[id number]


        Unless otherwise specified, Testing Performed by:


        [address] [address]


        For Inquires, the Physician may contact the Microbiology


        department at [phone]





  RESPIRATORY CULTURE  Final  


        Final





        MANY GRAM NEGATIVE RODS on 06/15/20 at 1107


        FINAL ID= [PSEUDOMONAS AERUGINOSA]


        MICRO CHARGES


        PSEUDOMONAS AERUGINOSA





  ANTIMICROBIAL SUSCEPTIBILITY  Final  


        Comment





        NEG [facility]SON 56


        PSEUDOMONAS AERUGINOSA


        ANTIBIOTIC                        RESULT          INTERPRETATION


        AMIKACIN                          <=16                  S


        AZTREONAM                         <=4                   S


        CEFTAZIDIME                       <=1                   S


        CIPROFLOXACIN                     <=0.25                S


        CEFEPIME                          <=2                   S


        CEFTAZIDIME/AVIBACTAM             <=4                   S


        GENTAMICIN                        <=2                   S


        LEVOFLOXACIN                      <=0.5                 S





Impression


.





IMPRESSION:


1.  Acute hypoxemic respiratory failure secondary to ARDS status post trach, d

eveloped anemia 6/7, blood drainage from RLQ abdomen drain site, and surrounding

firmness  / developed septic shock 6/7 from abdomen source, required levo 6/7


s/p 3 new drains 6/7 with brown color drainage,  


2.  Gallstone pancreatitis, now with ongoing bleeding from prior drain. Anemic. 

s/p Tx multiple units over several days


3.  septic shock/sepsis, recurrent 6/7, source abdomen. new fever  ? aspiration 

pneumonitis/pneumonia


4.  Acute kidney injury-, Off HD--renal function decling. suspect JED on CKD due

to hypotension , improved now


5.  Acute gallstone pancreatitis.


6.  Hypoalbuminemia.


7.  Moderate persistent effusions, s/p left thora  5/12, reaccumulation of left 

effusion. O2 requirement not changed. 


8.  Fever- ,hypotension. suspect recurrent sepsis/ likely pancreatic source.  

Per ID, per surgery--


9.  Chronic anemia-- ongoing / s/p PRBC


10. Covid 19 testing negative


11. Moderate to large ascites-S/P paracentisis


12.S/P paracentisis with 4 liters removed on 4/15/20


13. S/P IR drain placement on 5/8/2020, removal, re inserted 6/7


14. Depression/Anxiety 


15.,  Fever, per ID


16.  Status post chest tube placement, not much drainage


6/30


S/P Exploratory laparotomy, lysis of adhesions, subtotal cholecystectomy with 

cholangiogram, gastrojejunostomy tube placement, pancreatic necrosectomy


leukocytosis- improving





Plan


.


Chest tube drained a bit after Cathflo


We will send pleural fluid for analysis, patient now with fever will defer 

further work-up to ID and surgery


Continue trach shield


Up to chair


Follow culture


Follow surgery input


DVT GI prophylaxis


ABX per ID 


f/u BC /resp cultures 


Continue TPN for nutrition support 


DVT/GI PPX


D/W RN and RT,











STEVE MIRANDA MD              Jul 15, 2020 09:22

## 2020-07-16 VITALS — DIASTOLIC BLOOD PRESSURE: 77 MMHG | SYSTOLIC BLOOD PRESSURE: 132 MMHG

## 2020-07-16 VITALS — SYSTOLIC BLOOD PRESSURE: 151 MMHG | DIASTOLIC BLOOD PRESSURE: 80 MMHG

## 2020-07-16 VITALS — SYSTOLIC BLOOD PRESSURE: 135 MMHG | DIASTOLIC BLOOD PRESSURE: 83 MMHG

## 2020-07-16 VITALS — SYSTOLIC BLOOD PRESSURE: 150 MMHG | DIASTOLIC BLOOD PRESSURE: 91 MMHG

## 2020-07-16 VITALS — SYSTOLIC BLOOD PRESSURE: 136 MMHG | DIASTOLIC BLOOD PRESSURE: 83 MMHG

## 2020-07-16 VITALS — SYSTOLIC BLOOD PRESSURE: 173 MMHG | DIASTOLIC BLOOD PRESSURE: 89 MMHG

## 2020-07-16 VITALS — DIASTOLIC BLOOD PRESSURE: 83 MMHG | SYSTOLIC BLOOD PRESSURE: 158 MMHG

## 2020-07-16 VITALS — DIASTOLIC BLOOD PRESSURE: 101 MMHG | SYSTOLIC BLOOD PRESSURE: 161 MMHG

## 2020-07-16 VITALS — DIASTOLIC BLOOD PRESSURE: 95 MMHG | SYSTOLIC BLOOD PRESSURE: 151 MMHG

## 2020-07-16 VITALS — DIASTOLIC BLOOD PRESSURE: 88 MMHG | SYSTOLIC BLOOD PRESSURE: 152 MMHG

## 2020-07-16 VITALS — DIASTOLIC BLOOD PRESSURE: 94 MMHG | SYSTOLIC BLOOD PRESSURE: 164 MMHG

## 2020-07-16 VITALS — DIASTOLIC BLOOD PRESSURE: 50 MMHG | SYSTOLIC BLOOD PRESSURE: 144 MMHG

## 2020-07-16 VITALS — DIASTOLIC BLOOD PRESSURE: 80 MMHG | SYSTOLIC BLOOD PRESSURE: 134 MMHG

## 2020-07-16 VITALS — DIASTOLIC BLOOD PRESSURE: 81 MMHG | SYSTOLIC BLOOD PRESSURE: 133 MMHG

## 2020-07-16 VITALS — SYSTOLIC BLOOD PRESSURE: 153 MMHG | DIASTOLIC BLOOD PRESSURE: 96 MMHG

## 2020-07-16 VITALS — SYSTOLIC BLOOD PRESSURE: 134 MMHG | DIASTOLIC BLOOD PRESSURE: 79 MMHG

## 2020-07-16 VITALS — SYSTOLIC BLOOD PRESSURE: 168 MMHG | DIASTOLIC BLOOD PRESSURE: 96 MMHG

## 2020-07-16 VITALS — SYSTOLIC BLOOD PRESSURE: 158 MMHG | DIASTOLIC BLOOD PRESSURE: 98 MMHG

## 2020-07-16 VITALS — DIASTOLIC BLOOD PRESSURE: 108 MMHG | SYSTOLIC BLOOD PRESSURE: 175 MMHG

## 2020-07-16 VITALS — DIASTOLIC BLOOD PRESSURE: 100 MMHG | SYSTOLIC BLOOD PRESSURE: 174 MMHG

## 2020-07-16 VITALS — DIASTOLIC BLOOD PRESSURE: 93 MMHG | SYSTOLIC BLOOD PRESSURE: 160 MMHG

## 2020-07-16 VITALS — SYSTOLIC BLOOD PRESSURE: 147 MMHG | DIASTOLIC BLOOD PRESSURE: 86 MMHG

## 2020-07-16 LAB
ANION GAP SERPL CALC-SCNC: 4 MMOL/L (ref 6–14)
BASOPHILS # BLD AUTO: 0 X10^3/UL (ref 0–0.2)
BASOPHILS NFR BLD: 0 % (ref 0–3)
BUN SERPL-MCNC: 10 MG/DL (ref 7–20)
CALCIUM SERPL-MCNC: 8.7 MG/DL (ref 8.5–10.1)
CHLORIDE SERPL-SCNC: 103 MMOL/L (ref 98–107)
CO2 SERPL-SCNC: 29 MMOL/L (ref 21–32)
CREAT SERPL-MCNC: 0.6 MG/DL (ref 0.6–1)
EOSINOPHIL NFR BLD: 0.3 X10^3/UL (ref 0–0.7)
EOSINOPHIL NFR BLD: 4 % (ref 0–3)
ERYTHROCYTE [DISTWIDTH] IN BLOOD BY AUTOMATED COUNT: 15.1 % (ref 11.5–14.5)
GFR SERPLBLD BASED ON 1.73 SQ M-ARVRAT: 106.3 ML/MIN
GLUCOSE SERPL-MCNC: 153 MG/DL (ref 70–99)
HCT VFR BLD CALC: 22.6 % (ref 36–47)
HGB BLD-MCNC: 7.4 G/DL (ref 12–15.5)
LYMPHOCYTES # BLD: 1.2 X10^3/UL (ref 1–4.8)
LYMPHOCYTES NFR BLD AUTO: 14 % (ref 24–48)
MCH RBC QN AUTO: 29 PG (ref 25–35)
MCHC RBC AUTO-ENTMCNC: 33 G/DL (ref 31–37)
MCV RBC AUTO: 87 FL (ref 79–100)
MONO #: 1.2 X10^3/UL (ref 0–1.1)
MONOCYTES NFR BLD: 14 % (ref 0–9)
NEUT #: 5.8 X10^3/UL (ref 1.8–7.7)
NEUTROPHILS NFR BLD AUTO: 68 % (ref 31–73)
PLATELET # BLD AUTO: 580 X10^3/UL (ref 140–400)
POTASSIUM SERPL-SCNC: 3.9 MMOL/L (ref 3.5–5.1)
RBC # BLD AUTO: 2.61 X10^6/UL (ref 3.5–5.4)
SODIUM SERPL-SCNC: 136 MMOL/L (ref 136–145)
WBC # BLD AUTO: 8.5 X10^3/UL (ref 4–11)

## 2020-07-16 RX ADMIN — MEROPENEM SCH MLS/HR: 500 INJECTION, POWDER, FOR SOLUTION INTRAVENOUS at 14:11

## 2020-07-16 RX ADMIN — ACETYLCYSTEINE SCH MG: 200 INHALANT RESPIRATORY (INHALATION) at 07:52

## 2020-07-16 RX ADMIN — MEROPENEM SCH MLS/HR: 500 INJECTION, POWDER, FOR SOLUTION INTRAVENOUS at 17:29

## 2020-07-16 RX ADMIN — INSULIN LISPRO SCH UNITS: 100 INJECTION, SOLUTION INTRAVENOUS; SUBCUTANEOUS at 17:28

## 2020-07-16 RX ADMIN — FENTANYL CITRATE PRN MCG: 50 INJECTION INTRAMUSCULAR; INTRAVENOUS at 08:21

## 2020-07-16 RX ADMIN — IPRATROPIUM BROMIDE AND ALBUTEROL SULFATE SCH ML: .5; 3 SOLUTION RESPIRATORY (INHALATION) at 11:46

## 2020-07-16 RX ADMIN — FENTANYL CITRATE PRN MCG: 50 INJECTION INTRAMUSCULAR; INTRAVENOUS at 19:20

## 2020-07-16 RX ADMIN — INSULIN LISPRO SCH UNITS: 100 INJECTION, SOLUTION INTRAVENOUS; SUBCUTANEOUS at 12:00

## 2020-07-16 RX ADMIN — ONDANSETRON PRN MG: 2 INJECTION INTRAMUSCULAR; INTRAVENOUS at 04:40

## 2020-07-16 RX ADMIN — IPRATROPIUM BROMIDE AND ALBUTEROL SULFATE SCH ML: .5; 3 SOLUTION RESPIRATORY (INHALATION) at 07:52

## 2020-07-16 RX ADMIN — Medication PRN EACH: at 14:30

## 2020-07-16 RX ADMIN — ACETYLCYSTEINE SCH MG: 200 INHALANT RESPIRATORY (INHALATION) at 19:54

## 2020-07-16 RX ADMIN — FENTANYL SCH PATCH: 12.5 PATCH TRANSDERMAL at 08:27

## 2020-07-16 RX ADMIN — ONDANSETRON PRN MG: 2 INJECTION INTRAMUSCULAR; INTRAVENOUS at 08:18

## 2020-07-16 RX ADMIN — CIPROFLOXACIN SCH MLS/HR: 2 INJECTION, SOLUTION INTRAVENOUS at 10:38

## 2020-07-16 RX ADMIN — DEXTROSE SCH MLS/HR: 50 INJECTION, SOLUTION INTRAVENOUS at 08:19

## 2020-07-16 RX ADMIN — IPRATROPIUM BROMIDE AND ALBUTEROL SULFATE SCH ML: .5; 3 SOLUTION RESPIRATORY (INHALATION) at 03:40

## 2020-07-16 RX ADMIN — ENOXAPARIN SODIUM SCH MG: 40 INJECTION SUBCUTANEOUS at 08:18

## 2020-07-16 RX ADMIN — CIPROFLOXACIN SCH MLS/HR: 2 INJECTION, SOLUTION INTRAVENOUS at 21:05

## 2020-07-16 RX ADMIN — BACITRACIN SCH MLS/HR: 5000 INJECTION, POWDER, FOR SOLUTION INTRAMUSCULAR at 14:33

## 2020-07-16 RX ADMIN — PANTOPRAZOLE SODIUM SCH MG: 40 INJECTION, POWDER, FOR SOLUTION INTRAVENOUS at 08:18

## 2020-07-16 RX ADMIN — INSULIN LISPRO SCH UNITS: 100 INJECTION, SOLUTION INTRAVENOUS; SUBCUTANEOUS at 06:00

## 2020-07-16 RX ADMIN — IPRATROPIUM BROMIDE AND ALBUTEROL SULFATE SCH ML: .5; 3 SOLUTION RESPIRATORY (INHALATION) at 19:54

## 2020-07-16 RX ADMIN — MEROPENEM SCH MLS/HR: 500 INJECTION, POWDER, FOR SOLUTION INTRAVENOUS at 00:17

## 2020-07-16 RX ADMIN — DAPTOMYCIN SCH MLS/HR: 500 INJECTION, POWDER, LYOPHILIZED, FOR SOLUTION INTRAVENOUS at 10:38

## 2020-07-16 RX ADMIN — INSULIN LISPRO SCH UNITS: 100 INJECTION, SOLUTION INTRAVENOUS; SUBCUTANEOUS at 00:00

## 2020-07-16 RX ADMIN — IPRATROPIUM BROMIDE AND ALBUTEROL SULFATE SCH ML: .5; 3 SOLUTION RESPIRATORY (INHALATION) at 00:03

## 2020-07-16 RX ADMIN — MEROPENEM SCH MLS/HR: 500 INJECTION, POWDER, FOR SOLUTION INTRAVENOUS at 06:16

## 2020-07-16 RX ADMIN — IPRATROPIUM BROMIDE AND ALBUTEROL SULFATE SCH ML: .5; 3 SOLUTION RESPIRATORY (INHALATION) at 15:48

## 2020-07-16 NOTE — NUR
SS following up with discharge planning. SS reviewed pt chart and discussed with pt RN. No 
new changes. Pt remains on trach collar. Pt is on TPN, IV Cipro, IV Daptomycin, IV 
Micafungin, and IV Meropenem. Pt has J Tube, chest tube, two Avi drains, and three ROBERT 
drains. Pt is self pay pt. SS left message with [employer] requesting assistance with 
disability application. SS will continue to follow for discharge planning.

## 2020-07-16 NOTE — PDOC
PROGRESS NOTES


Chief Complaint


Chief Complaint


A/P


Acute hypoxic Respiratory failure required  mechanical ventilation


Tracheostomy


bilateral pleural effusions/pulm edema s/p Throacentesis on 6/15/2020


Severe Acute gallstone pancreatitis (not a surgical candidate at this time) with

necrosis


Acute kidney failure now requiring dialysis


Gallstones (Calculus of gallbladder with acute cholecystitis without 

obstruction)


HTN 


Intractable pain


Intractable nausea


Covid 19 negative. 


Acute on chronic anemia 


EEG: No seizure activity


Fever  - intermittent 


? Ileus with vomiting


Abd distention - U/S and CT reviewed s/p 0.4 L of opaque, debris-containing 

ascites was removed 5/6


Acute pancreatitis with persistent necrosis


Gallstone pancreatitis with necrosis. 


   -CT A/P 6/6 showed multiple pseudocysts, slight larger on the right. s/p 

drains x 3, 6/7.  + PSAE (MDRO-R Cefepime, Zosyn ANSON < 64) and yeast, 


   -s/p drain 4/27. C. parapsilosis. s/p drain 5/6 + yeast & high amylase; s/p 

additional drain on 5/8. Drains removed. 


Ascites s/p paracentesis 4/15 & 5/6. C. parapsilosis 


JED. off HD. 


A large fluid collection in the pancreatic bed has slightly decreased in size, 

described below, the pancreas itself is difficult to  visualize, which could be 

due to necrosis or obscuration of pancreatic  parenchyma from the surrounding 

fluid collection.6/15 


- 4/27 status post ROBERT drain placement + C paropsilosis. s/p additional drains 

5/8


Anemia - S/p PRBCs. 


Cholelithiasis with thickening of the gallbladder wall.


Leucocytosis improving


JED, hyperkalemia, Metabolic acidosis off dialysis


hypocalcemia 


Prediabetes


HTN


s/p trach


Hyperglycemia


severe protein-caloric malnutrition


Moderate to large left pleural effusion with atelectasis and collapse  of most 

of the left lower lobe, stable


 


Dispo - ICU, critically ill


Chest tube drained a bit after Cathflo


TPN per nutrition 


check blood cultures and fluid cultures given fever 


meropenam, cipro, micafungin per ID 


Continue trach shield


ID, gen sx, GI, pulm following 


DVT GI prophylaxis


Continue TPN for nutrition support 


critically ill 


poor prognosis


labs q am





History of Present Illness


History of Present Illness


secretions this AM. no fevers overnight





Vitals


Vitals





Vital Signs








  Date Time  Temp Pulse Resp B/P (MAP) Pulse Ox O2 Delivery O2 Flow Rate FiO2


 


7/16/20 09:00  120 30 151/80 (103) 96 Tracheal Collar  


 


7/16/20 08:51       8.0 


 


7/16/20 08:00 99.0       





 99.0       











Physical Exam


Physical Exam


GENERAL: Propped up in bed, awake, weak appearing 


HEENT: Pupils equal, oral cavity dry. NGT out, on vent


NECK:  Tracheostomy 


LUNGS: Diminished aeration bases,  CT on left 


HEART:  S1, S2, regular, tachy 


ABDOMEN: Sightly less distended, bowel sounds hypoactive, soft, richardson x 2, 3 ROBERT

drains, G-J tube and + wound vac 


: [place] in place 


EXTREMITIES: Generalized edema, no cyanosis. SCDs & Podus boots bilaterally, 


SKIN: warm touch. No signs of rash.  


NEURO: awake, mouthing some words, tracking 


LUE-PICC without signs of complications 


LUE art-line out, mottling about old art-line site is improving. RP palpable, 

cap refill brisk.


General:  Cooperative, No acute distress


Heart:  Regular rate (SR/ST), Other (distant heart sounds)


Lungs:  Crackles


Abdomen:  Soft, Other (multiple drains)


Extremities:  Other (Diffuse edema)


Skin:  No rashes, No significant lesion





Labs


LABS





Laboratory Tests








Test


 7/15/20


18:41 7/16/20


00:12 7/16/20


06:15


 


Glucose (Fingerstick)


 141 mg/dL


(70-99) 125 mg/dL


(70-99) 141 mg/dL


(70-99)


 


White Blood Count


 


 


 8.5 x10^3/uL


(4.0-11.0)


 


Red Blood Count


 


 


 2.61 x10^6/uL


(3.50-5.40)


 


Hemoglobin


 


 


 7.4 g/dL


(12.0-15.5)


 


Hematocrit


 


 


 22.6 %


(36.0-47.0)


 


Mean Corpuscular Volume   87 fL () 


 


Mean Corpuscular Hemoglobin   29 pg (25-35) 


 


Mean Corpuscular Hemoglobin


Concent 


 


 33 g/dL


(31-37)


 


Red Cell Distribution Width


 


 


 15.1 %


(11.5-14.5)


 


Platelet Count


 


 


 580 x10^3/uL


(140-400)


 


Neutrophils (%) (Auto)   68 % (31-73) 


 


Lymphocytes (%) (Auto)   14 % (24-48) 


 


Monocytes (%) (Auto)   14 % (0-9) 


 


Eosinophils (%) (Auto)   4 % (0-3) 


 


Basophils (%) (Auto)   0 % (0-3) 


 


Neutrophils # (Auto)


 


 


 5.8 x10^3/uL


(1.8-7.7)


 


Lymphocytes # (Auto)


 


 


 1.2 x10^3/uL


(1.0-4.8)


 


Monocytes # (Auto)


 


 


 1.2 x10^3/uL


(0.0-1.1)


 


Eosinophils # (Auto)


 


 


 0.3 x10^3/uL


(0.0-0.7)


 


Basophils # (Auto)


 


 


 0.0 x10^3/uL


(0.0-0.2)


 


Sodium Level


 


 


 136 mmol/L


(136-145)


 


Potassium Level


 


 


 3.9 mmol/L


(3.5-5.1)


 


Chloride Level


 


 


 103 mmol/L


()


 


Carbon Dioxide Level


 


 


 29 mmol/L


(21-32)


 


Anion Gap   4 (6-14) 


 


Blood Urea Nitrogen


 


 


 10 mg/dL


(7-20)


 


Creatinine


 


 


 0.6 mg/dL


(0.6-1.0)


 


Estimated GFR


(Cockcroft-Gault) 


 


 106.3 





 


Glucose Level


 


 


 153 mg/dL


(70-99)


 


Calcium Level


 


 


 8.7 mg/dL


(8.5-10.1)











Assessment and Plan


Assessmemt and Plan


Problems


Medical Problems:


(1) Acute pancreatitis


Status: Acute  





(2) Cholelithiasis


Status: Acute  











Comment


Review of Relevant


I have reviewed the following items josy (where applicable) has been applied.


Labs





Laboratory Tests








Test


 7/14/20


12:34 7/14/20


17:49 7/15/20


00:06 7/15/20


05:52


 


Glucose (Fingerstick)


 148 mg/dL


(70-99) 122 mg/dL


(70-99) 119 mg/dL


(70-99) 116 mg/dL


(70-99)


 


Test


 7/15/20


06:00 7/15/20


18:41 7/16/20


00:12 7/16/20


06:15


 


White Blood Count


 10.1 x10^3/uL


(4.0-11.0) 


 


 8.5 x10^3/uL


(4.0-11.0)


 


Red Blood Count


 2.44 x10^6/uL


(3.50-5.40) 


 


 2.61 x10^6/uL


(3.50-5.40)


 


Hemoglobin


 7.0 g/dL


(12.0-15.5) 


 


 7.4 g/dL


(12.0-15.5)


 


Hematocrit


 21.2 %


(36.0-47.0) 


 


 22.6 %


(36.0-47.0)


 


Mean Corpuscular Volume 87 fL ()    87 fL () 


 


Mean Corpuscular Hemoglobin 29 pg (25-35)    29 pg (25-35) 


 


Mean Corpuscular Hemoglobin


Concent 33 g/dL


(31-37) 


 


 33 g/dL


(31-37)


 


Red Cell Distribution Width


 14.8 %


(11.5-14.5) 


 


 15.1 %


(11.5-14.5)


 


Platelet Count


 551 x10^3/uL


(140-400) 


 


 580 x10^3/uL


(140-400)


 


Neutrophils (%) (Auto) 77 % (31-73)    68 % (31-73) 


 


Lymphocytes (%) (Auto) 10 % (24-48)    14 % (24-48) 


 


Monocytes (%) (Auto) 10 % (0-9)    14 % (0-9) 


 


Eosinophils (%) (Auto) 3 % (0-3)    4 % (0-3) 


 


Basophils (%) (Auto) 0 % (0-3)    0 % (0-3) 


 


Neutrophils # (Auto)


 7.8 x10^3/uL


(1.8-7.7) 


 


 5.8 x10^3/uL


(1.8-7.7)


 


Lymphocytes # (Auto)


 1.0 x10^3/uL


(1.0-4.8) 


 


 1.2 x10^3/uL


(1.0-4.8)


 


Monocytes # (Auto)


 1.0 x10^3/uL


(0.0-1.1) 


 


 1.2 x10^3/uL


(0.0-1.1)


 


Eosinophils # (Auto)


 0.3 x10^3/uL


(0.0-0.7) 


 


 0.3 x10^3/uL


(0.0-0.7)


 


Basophils # (Auto)


 0.0 x10^3/uL


(0.0-0.2) 


 


 0.0 x10^3/uL


(0.0-0.2)


 


Sodium Level


 135 mmol/L


(136-145) 


 


 136 mmol/L


(136-145)


 


Potassium Level


 4.1 mmol/L


(3.5-5.1) 


 


 3.9 mmol/L


(3.5-5.1)


 


Chloride Level


 102 mmol/L


() 


 


 103 mmol/L


()


 


Carbon Dioxide Level


 30 mmol/L


(21-32) 


 


 29 mmol/L


(21-32)


 


Anion Gap 3 (6-14)    4 (6-14) 


 


Blood Urea Nitrogen


 9 mg/dL (7-20) 


 


 


 10 mg/dL


(7-20)


 


Creatinine


 0.5 mg/dL


(0.6-1.0) 


 


 0.6 mg/dL


(0.6-1.0)


 


Estimated GFR


(Cockcroft-Gault) 131.1 


 


 


 106.3 





 


Glucose Level


 126 mg/dL


(70-99) 


 


 153 mg/dL


(70-99)


 


Calcium Level


 8.7 mg/dL


(8.5-10.1) 


 


 8.7 mg/dL


(8.5-10.1)


 


Glucose (Fingerstick)


 


 141 mg/dL


(70-99) 125 mg/dL


(70-99) 141 mg/dL


(70-99)








Laboratory Tests








Test


 7/15/20


18:41 7/16/20


00:12 7/16/20


06:15


 


Glucose (Fingerstick)


 141 mg/dL


(70-99) 125 mg/dL


(70-99) 141 mg/dL


(70-99)


 


White Blood Count


 


 


 8.5 x10^3/uL


(4.0-11.0)


 


Red Blood Count


 


 


 2.61 x10^6/uL


(3.50-5.40)


 


Hemoglobin


 


 


 7.4 g/dL


(12.0-15.5)


 


Hematocrit


 


 


 22.6 %


(36.0-47.0)


 


Mean Corpuscular Volume   87 fL () 


 


Mean Corpuscular Hemoglobin   29 pg (25-35) 


 


Mean Corpuscular Hemoglobin


Concent 


 


 33 g/dL


(31-37)


 


Red Cell Distribution Width


 


 


 15.1 %


(11.5-14.5)


 


Platelet Count


 


 


 580 x10^3/uL


(140-400)


 


Neutrophils (%) (Auto)   68 % (31-73) 


 


Lymphocytes (%) (Auto)   14 % (24-48) 


 


Monocytes (%) (Auto)   14 % (0-9) 


 


Eosinophils (%) (Auto)   4 % (0-3) 


 


Basophils (%) (Auto)   0 % (0-3) 


 


Neutrophils # (Auto)


 


 


 5.8 x10^3/uL


(1.8-7.7)


 


Lymphocytes # (Auto)


 


 


 1.2 x10^3/uL


(1.0-4.8)


 


Monocytes # (Auto)


 


 


 1.2 x10^3/uL


(0.0-1.1)


 


Eosinophils # (Auto)


 


 


 0.3 x10^3/uL


(0.0-0.7)


 


Basophils # (Auto)


 


 


 0.0 x10^3/uL


(0.0-0.2)


 


Sodium Level


 


 


 136 mmol/L


(136-145)


 


Potassium Level


 


 


 3.9 mmol/L


(3.5-5.1)


 


Chloride Level


 


 


 103 mmol/L


()


 


Carbon Dioxide Level


 


 


 29 mmol/L


(21-32)


 


Anion Gap   4 (6-14) 


 


Blood Urea Nitrogen


 


 


 10 mg/dL


(7-20)


 


Creatinine


 


 


 0.6 mg/dL


(0.6-1.0)


 


Estimated GFR


(Cockcroft-Gault) 


 


 106.3 





 


Glucose Level


 


 


 153 mg/dL


(70-99)


 


Calcium Level


 


 


 8.7 mg/dL


(8.5-10.1)








Microbiology


7/12/20 Gram Stain Evaluation - Final, Complete


          


7/12/20 Respiratory Culture - Final, Complete


          


7/12/20 Antimicrobic Susceptibility - Final, Complete


          


7/12/20 Blood Culture - Preliminary, Resulted


          NO GROWTH AFTER 4 DAYS


6/30/20 Gram Stain - Final, Complete


          


6/30/20 Aerobic and Anaerobic Culture - Final, Complete


          


6/30/20 Antimicrobic Susceptibility - Final, Complete


          


6/15/20 Gram Stain - Final, Complete


          


6/15/20 Aerobic and Anaerobic Culture - Final, Complete


          


6/7/20 Urine Culture - Final, Complete


         


5/30/20 Gram Stain - Final, Complete


          


5/30/20 Aerobic Culture - Final, Complete


Medications





Current Medications


Sodium Chloride 1,000 ml @  1,000 mls/hr Q1H IV  Last administered on 3/16/20at 

03:00;  Start 3/16/20 at 03:00;  Stop 3/16/20 at 03:59;  Status DC


Ondansetron HCl (Zofran) 4 mg 1X  ONCE IVP  Last administered on 3/16/20at 

03:27;  Start 3/16/20 at 03:00;  Stop 3/16/20 at 03:01;  Status DC


Morphine Sulfate (Morphine Sulfate) 4 mg 1X  ONCE IV ;  Start 3/16/20 at 03:00; 

Stop 3/16/20 at 03:01;  Status Cancel


Ketorolac Tromethamine (Toradol 30mg Vial) 30 mg 1X  ONCE IV  Last administered 

on 3/16/20at 02:54;  Start 3/16/20 at 03:00;  Stop 3/16/20 at 03:01;  Status DC


Fentanyl Citrate (Fentanyl 2ml Vial) 25 mcg 1X  ONCE IVP  Last administered on 

3/16/20at 03:23;  Start 3/16/20 at 03:30;  Stop 3/16/20 at 03:31;  Status DC


Fentanyl Citrate (Fentanyl 2ml Vial) 100 mcg STK-MED ONCE .ROUTE ;  Start 

3/16/20 at 03:18;  Stop 3/16/20 at 03:18;  Status DC


Iohexol (Omnipaque 350 Mg/ml) 90 ml 1X  ONCE IV  Last administered on 3/16/20at 

03:25;  Start 3/16/20 at 03:30;  Stop 3/16/20 at 03:31;  Status DC


Info (CONTRAST GIVEN -- Rx MONITORING) 1 each PRN DAILY  PRN MC SEE COMMENTS;  

Start 3/16/20 at 03:30;  Stop 3/18/20 at 03:29;  Status DC


Hydromorphone HCl (Dilaudid) 0.5 mg 1X  ONCE IV  Last administered on 3/16/20at 

03:55;  Start 3/16/20 at 04:30;  Stop 3/16/20 at 04:32;  Status DC


Ondansetron HCl (Zofran) 4 mg PRN Q8HRS  PRN IV NAUSEA/VOMITING 1ST CHOICE;  

Start 3/16/20 at 05:00;  Stop 3/16/20 at 09:27;  Status DC


Morphine Sulfate (Morphine Sulfate) 2 mg PRN Q2HR  PRN IV SEVERE PAIN 7-10 Last 

administered on 3/17/20at 12:26;  Start 3/16/20 at 05:00;  Stop 3/17/20 at 

14:15;  Status DC


Sodium Chloride 1,000 ml @  125 mls/hr Q8H IV  Last administered on 3/16/20at 

20:56;  Start 3/16/20 at 05:00;  Stop 3/17/20 at 04:59;  Status DC


Hydromorphone HCl (Dilaudid) 0.5 mg PRN Q3HRS  PRN IV SEVERE PAIN 7-10 Last 

administered on 3/17/20at 10:06;  Start 3/16/20 at 05:00;  Stop 3/17/20 at 

12:01;  Status DC


Piperacillin Sod/ Tazobactam Sod 4.5 gm/Sodium Chloride 100 ml @  200 mls/hr 1X 

ONCE IV  Last administered on 3/16/20at 05:44;  Start 3/16/20 at 06:00;  Stop 

3/16/20 at 06:29;  Status DC


Ondansetron HCl (Zofran) 4 mg PRN Q4HRS  PRN IV NAUSEA/VOMITING 1ST CHOICE Last 

administered on 7/16/20at 08:18;  Start 3/16/20 at 09:30


Insulin Human Lispro (HumaLOG) 0-9 UNITS Q6HRS SQ  Last administered on 

7/12/20at 12:43;  Start 3/16/20 at 09:30


Dextrose (Dextrose 50%-Water Syringe) 12.5 gm PRN Q15MIN  PRN IV SEE COMMENTS;  

Start 3/16/20 at 09:30


Pantoprazole Sodium (PROTONIX VIAL for IV PUSH) 40 mg DAILYAC IVP  Last 

administered on 7/16/20at 08:18;  Start 3/16/20 at 11:30


Prochlorperazine Edisylate (Compazine) 10 mg PRN Q6HRS  PRN IV NAUSEA/VOMITING, 

2nd CHOICE Last administered on 7/13/20at 20:17;  Start 3/16/20 at 17:45


Atenolol (Tenormin) 100 mg DAILY PO ;  Start 3/17/20 at 09:00;  Stop 3/16/20 at 

20:08;  Status DC


Metoprolol Tartrate (Lopressor Vial) 2.5 mg Q6HRS IVP  Last administered on 

3/17/20at 05:51;  Start 3/16/20 at 20:15;  Stop 3/17/20 at 10:02;  Status DC


Metoprolol Tartrate (Lopressor Vial) 5 mg Q6HRS IVP  Last administered on 

3/26/20at 00:12;  Start 3/17/20 at 10:15;  Stop 3/28/20 at 08:48;  Status DC


Hydromorphone HCl (Dilaudid) 1 mg PRN Q3HRS  PRN IV SEVERE PAIN 7-10 Last 

administered on 3/23/20at 05:13;  Start 3/17/20 at 12:00;  Stop 3/31/20 at 

00:25;  Status DC


Lidocaine HCl (Buffered Lidocaine 1%) 3 ml STK-MED ONCE .ROUTE ;  Start 3/17/20 

at 12:55;  Stop 3/17/20 at 12:56;  Status DC


Albumin Human 500 ml @  125 mls/hr 1X  ONCE IV  Last administered on 3/17/20at 

14:33;  Start 3/17/20 at 14:30;  Stop 3/17/20 at 18:32;  Status DC


Norepinephrine Bitartrate 8 mg/ Dextrose 258 ml @  17.299 mls/ hr CONT  PRN IV 

PER PROTOCOL Last administered on 4/14/20at 12:48;  Start 3/17/20 at 15:30;  

Stop 4/17/20 at 09:19;  Status DC


Sodium Chloride 1,000 ml @  125 mls/hr Q8H IV  Last administered on 3/17/20at 

21:04;  Start 3/17/20 at 16:00;  Stop 3/18/20 at 02:42;  Status DC


Albumin Human 500 ml @  125 mls/hr PRN BID  PRN IV After every 2L NSS & BP < 

90mm Last administered on 6/30/20at 16:06;  Start 3/17/20 at 16:00;  Stop 7/3/20

at 09:30;  Status DC


Iohexol (Omnipaque 300 Mg/ml) 60 ml 1X  ONCE IV  Last administered on 3/17/20at 

17:20;  Start 3/17/20 at 17:00;  Stop 3/17/20 at 17:01;  Status DC


Info (CONTRAST GIVEN -- Rx MONITORING) 1 each PRN DAILY  PRN MC SEE COMMENTS;  

Start 3/17/20 at 17:00;  Stop 3/19/20 at 16:59;  Status DC


Meropenem 1 gm/ Sodium Chloride 100 ml @  200 mls/hr Q8HRS IV  Last administered

on 3/18/20at 05:45;  Start 3/17/20 at 20:00;  Stop 3/18/20 at 08:48;  Status DC


Furosemide (Lasix) 40 mg 1X  ONCE IVP  Last administered on 3/17/20at 22:12;  

Start 3/17/20 at 22:30;  Stop 3/17/20 at 22:31;  Status DC


Calcium Chloride 1000 mg/Sodium Chloride 110 ml @  220 mls/hr 1X  ONCE IV  Last 

administered on 3/17/20at 22:11;  Start 3/17/20 at 22:30;  Stop 3/17/20 at 

22:59;  Status DC


Albuterol Sulfate (Ventolin Neb Soln) 2.5 mg 1X  ONCE NEB  Last administered on 

3/18/20at 00:56;  Start 3/17/20 at 22:30;  Stop 3/17/20 at 22:31;  Status DC


Insulin Human Regular (HumuLIN R VIAL) 5 unit 1X  ONCE IV  Last administered on 

3/17/20at 22:14;  Start 3/17/20 at 22:30;  Stop 3/17/20 at 22:31;  Status DC


Magnesium Sulfate 50 ml @ 25 mls/hr 1X  ONCE IV  Last administered on 3/18/20at 

02:57;  Start 3/18/20 at 03:00;  Stop 3/18/20 at 04:59;  Status DC


Calcium Gluconate 1000 mg/Sodium Chloride 110 ml @  220 mls/hr 1X  ONCE IV  Last

administered on 3/18/20at 02:46;  Start 3/18/20 at 03:00;  Stop 3/18/20 at 

03:29;  Status DC


Sodium Chloride 1,000 ml @  200 mls/hr Q5H IV  Last administered on 3/18/20at 

02:46;  Start 3/18/20 at 03:00;  Stop 3/18/20 at 10:21;  Status DC


Calcium Gluconate 1000 mg/Sodium Chloride 110 ml @  220 mls/hr 1X  ONCE IV  Last

administered on 3/18/20at 03:21;  Start 3/18/20 at 03:30;  Stop 3/18/20 at 

03:59;  Status DC


Sodium Bicarbonate 50 meq/Sodium Chloride 1,050 ml @  75 mls/hr Q14H IV  Last 

administered on 3/22/20at 21:10;  Start 3/18/20 at 07:30;  Stop 3/23/20 at 

10:28;  Status DC


Calcium Gluconate 2000 mg/Sodium Chloride 120 ml @  220 mls/hr 1X  ONCE IV  Last

administered on 3/18/20at 09:05;  Start 3/18/20 at 07:30;  Stop 3/18/20 at 

08:02;  Status DC


Lidocaine HCl (Xylocaine-Mpf 1% 2ml Vial) 2 ml STK-MED ONCE .ROUTE ;  Start 

3/18/20 at 08:47;  Stop 3/18/20 at 08:47;  Status DC


Meropenem 500 mg/ Sodium Chloride 50 ml @  100 mls/hr Q12HR IV  Last 

administered on 3/23/20at 21:01;  Start 3/18/20 at 18:00;  Stop 3/24/20 at 

07:58;  Status DC


Lidocaine HCl (Buffered Lidocaine 1%) 3 ml STK-MED ONCE .ROUTE ;  Start 3/18/20 

at 09:46;  Stop 3/18/20 at 09:46;  Status DC


Lidocaine HCl (Buffered Lidocaine 1%) 6 ml 1X  ONCE INJ  Last administered on 

3/18/20at 10:26;  Start 3/18/20 at 10:15;  Stop 3/18/20 at 10:16;  Status DC


Info (Tpn Per Pharmacy) 1 each PRN DAILY  PRN MC SEE COMMENTS Last administered 

on 7/14/20at 10:47;  Start 3/18/20 at 12:00


Sodium Chloride 1,000 ml @  1,000 mls/hr Q1H PRN IV hypotension;  Start 3/18/20 

at 12:07;  Stop 3/18/20 at 18:06;  Status DC


Diphenhydramine HCl (Benadryl) 25 mg 1X PRN  PRN IV ITCHING;  Start 3/18/20 at 

12:15;  Stop 3/19/20 at 12:14;  Status DC


Diphenhydramine HCl (Benadryl) 25 mg 1X PRN  PRN IV ITCHING;  Start 3/18/20 at 

12:15;  Stop 3/19/20 at 12:14;  Status DC


Sodium Chloride 1,000 ml @  400 mls/hr Q2H30M PRN IV PATENCY;  Start 3/18/20 at 

12:07;  Stop 3/19/20 at 00:06;  Status DC


Info (PHARMACY MONITORING -- do not chart) 1 each PRN DAILY  PRN MC SEE 

COMMENTS;  Start 3/18/20 at 12:15;  Stop 3/20/20 at 08:13;  Status DC


Sodium Chloride 90 meq/Calcium Gluconate 10 meq/ Multivitamins 10 ml/Chromium/ 

Copper/Manganese/ Seleni/Zn 1 ml/ Total Parenteral Nutrition/Amino 

Acids/Dextrose/ Fat Emulsion Intravenous 55.005 ml  @ 2.292 mls/hr TPN  CONT IV 

;  Start 3/18/20 at 22:00;  Stop 3/18/20 at 12:33;  Status DC


Info (Tpn Per Pharmacy) 1 each PRN DAILY  PRN MC SEE COMMENTS;  Start 3/18/20 at

12:30;  Status UNV


Sodium Chloride 90 meq/Calcium Gluconate 10 meq/ Multivitamins 10 ml/Chromium/ 

Copper/Manganese/ Seleni/Zn 0.5 ml/ Total Parenteral Nutrition/Amino 

Acids/Dextrose/ Fat Emulsion Intravenous 1,512 ml @  63 mls/hr TPN  CONT IV  

Last administered on 3/18/20at 22:06;  Start 3/18/20 at 22:00;  Stop 3/19/20 at 

21:59;  Status DC


Calcium Carbonate/ Glycine (Tums) 500 mg PRN AFTMEALHC  PRN PO INDIGESTION;  

Start 3/18/20 at 17:45;  Stop 5/13/20 at 10:25;  Status DC


Calcium Gluconate (Calcium Gluconate) 2,000 mg 1X  ONCE IVP  Last administered 

on 3/19/20at 02:19;  Start 3/19/20 at 02:15;  Stop 3/19/20 at 02:16;  Status DC


Calcium Chloride 3000 mg/Sodium Chloride 1,030 ml @  50 mls/hr [id number] IV  Last 

administered on 3/21/20at 02:17;  Start 3/19/20 at 08:00;  Stop 3/21/20 at 

15:23;  Status DC


Lorazepam (Ativan Inj) 1 mg PRN Q4HRS  PRN IVP ANXIETY / AGITATION, 2nd choic 

Last administered on 4/17/20at 03:51;  Start 3/19/20 at 09:00;  Stop 4/17/20 at 

09:19;  Status DC


Sodium Chloride 1,000 ml @  1,000 mls/hr Q1H PRN IV hypotension;  Start 3/19/20 

at 08:56;  Stop 3/19/20 at 14:55;  Status DC


Albumin Human 200 ml @  200 mls/hr 1X PRN  PRN IV Hypotension;  Start 3/19/20 at

09:00;  Stop 3/19/20 at 14:59;  Status DC


Diphenhydramine HCl (Benadryl) 25 mg 1X PRN  PRN IV ITCHING;  Start 3/19/20 at 

09:00;  Stop 3/20/20 at 08:59;  Status DC


Diphenhydramine HCl (Benadryl) 25 mg 1X PRN  PRN IV ITCHING;  Start 3/19/20 at 

09:00;  Stop 3/20/20 at 08:59;  Status DC


Sodium Chloride 1,000 ml @  400 mls/hr Q2H30M PRN IV PATENCY;  Start 3/19/20 at 

08:56;  Stop 3/19/20 at 20:55;  Status DC


Info (PHARMACY MONITORING -- do not chart) 1 each PRN DAILY  PRN MC SEE 

COMMENTS;  Start 3/19/20 at 09:00;  Status UNV


Info (PHARMACY MONITORING -- do not chart) 1 each PRN DAILY  PRN MC SEE COMME

NTS;  Start 3/19/20 at 09:00;  Stop 3/20/20 at 08:13;  Status DC


Digoxin (Lanoxin) 500 mcg 1X  ONCE IV  Last administered on 3/19/20at 10:04;  

Start 3/19/20 at 10:00;  Stop 3/19/20 at 10:01;  Status DC


Digoxin (Lanoxin) 125 mcg 1X  ONCE IV  Last administered on 3/19/20at 17:10;  

Start 3/19/20 at 18:00;  Stop 3/19/20 at 18:01;  Status DC


Magnesium Sulfate 100 ml @  25 mls/hr 1X  ONCE IV  Last administered on 

3/19/20at 12:48;  Start 3/19/20 at 13:00;  Stop 3/19/20 at 16:59;  Status DC


Sodium Chloride 90 meq/Magnesium Sulfate 10 meq/ Calcium Gluconate 20 meq/ 

Multivitamins 10 ml/Chromium/ Copper/Manganese/ Seleni/Zn 0.5 ml/ Total Paren

teral Nutrition/Amino Acids/Dextrose/ Fat Emulsion Intravenous 1,512 ml @  63 

mls/hr TPN  CONT IV  Last administered on 3/19/20at 22:25;  Start 3/19/20 at 

22:00;  Stop 3/20/20 at 21:59;  Status DC


Sodium Chloride 1,000 ml @  1,000 mls/hr Q1H PRN IV hypotension;  Start 3/20/20 

at 08:05;  Stop 3/20/20 at 14:04;  Status DC


Albumin Human 200 ml @  200 mls/hr 1X  ONCE IV  Last administered on 3/20/20at 

08:57;  Start 3/20/20 at 08:15;  Stop 3/20/20 at 09:14;  Status DC


Diphenhydramine HCl (Benadryl) 25 mg 1X PRN  PRN IV ITCHING;  Start 3/20/20 at 

08:15;  Stop 3/21/20 at 08:14;  Status DC


Diphenhydramine HCl (Benadryl) 25 mg 1X PRN  PRN IV ITCHING;  Start 3/20/20 at 

08:15;  Stop 3/21/20 at 08:14;  Status DC


Sodium Chloride 1,000 ml @  400 mls/hr Q2H30M PRN IV PATENCY;  Start 3/20/20 at 

08:05;  Stop 3/20/20 at 20:04;  Status DC


Info (PHARMACY MONITORING -- do not chart) 1 each PRN DAILY  PRN MC SEE 

COMMENTS;  Start 3/20/20 at 08:15;  Stop 3/24/20 at 07:57;  Status DC


Sodium Chloride 90 meq/Potassium Chloride 15 meq/ Potassium Phosphate 10 mmol/ 

Magnesium Sulfate 10 meq/Calcium Gluconate 20 meq/ Multivitamins 10 ml/Chromium/

Copper/Manganese/ Seleni/Zn 0.5 ml/ Total Parenteral Nutrition/Amino 

Acids/Dextrose/ Fat Emulsion Intravenous 1,512 ml @  63 mls/hr TPN  CONT IV  

Last administered on 3/20/20at 21:01;  Start 3/20/20 at 22:00;  Stop 3/21/20 at 

21:59;  Status DC


Potassium Chloride/Water 100 ml @  100 mls/hr 1X  ONCE IV  Last administered on 

3/20/20at 14:09;  Start 3/20/20 at 14:00;  Stop 3/20/20 at 14:59;  Status DC


Benzocaine (Hurricaine One) 1 spray 1X  ONCE MM  Last administered on 3/20/20at 

16:38;  Start 3/20/20 at 14:30;  Stop 3/20/20 at 14:31;  Status DC


Lidocaine HCl (Glydo (Lidocaine) Jelly) 1 thomas 1X  ONCE MM  Last administered on 

3/20/20at 16:38;  Start 3/20/20 at 14:30;  Stop 3/20/20 at 14:31;  Status DC


Linezolid/Dextrose 300 ml @  300 mls/hr Q12HR IV  Last administered on 3/26/20at

21:04;  Start 3/20/20 at 20:00;  Stop 3/27/20 at 07:50;  Status DC


Acetaminophen (Tylenol) 650 mg PRN Q6HRS  PRN PO MILD PAIN / TEMP;  Start 

3/21/20 at 03:30;  Stop 3/21/20 at 03:36;  Status DC


Acetaminophen (Tylenol) 650 mg PRN Q6HRS  PRN PEG MILD PAIN / TEMP Last 

administered on 4/16/20at 19:56;  Start 3/21/20 at 03:36;  Stop 5/13/20 at 

10:25;  Status DC


Sodium Chloride 1,000 ml @  1,000 mls/hr Q1H PRN IV hypotension;  Start 3/21/20 

at 07:50;  Stop 3/21/20 at 13:49;  Status DC


Albumin Human 200 ml @  200 mls/hr 1X PRN  PRN IV Hypotension;  Start 3/21/20 at

08:00;  Stop 3/21/20 at 13:59;  Status DC


Sodium Chloride (Normal Saline Flush) 10 ml 1X PRN  PRN IV AP catheter pack;  

Start 3/21/20 at 08:00;  Stop 3/22/20 at 07:59;  Status DC


Sodium Chloride (Normal Saline Flush) 10 ml 1X PRN  PRN IV  catheter pack;  

Start 3/21/20 at 08:00;  Stop 3/22/20 at 07:59;  Status DC


Sodium Chloride 1,000 ml @  400 mls/hr Q2H30M PRN IV PATENCY;  Start 3/21/20 at 

07:50;  Stop 3/21/20 at 19:49;  Status DC


Info (PHARMACY MONITORING -- do not chart) 1 each PRN DAILY  PRN MC SEE 

COMMENTS;  Start 3/21/20 at 08:00;  Status UNV


Info (PHARMACY MONITORING -- do not chart) 1 each PRN DAILY  PRN MC SEE 

COMMENTS;  Start 3/21/20 at 08:00;  Stop 3/23/20 at 08:25;  Status DC


Sodium Chloride 90 meq/Potassium Chloride 15 meq/ Potassium Phosphate 10 mmol/ 

Magnesium Sulfate 10 meq/Calcium Gluconate 20 meq/ Multivitamins 10 ml/Chromium/

Copper/Manganese/ Seleni/Zn 0.5 ml/ Total Parenteral Nutrition/Amino 

Acids/Dextrose/ Fat Emulsion Intravenous 1,512 ml @  63 mls/hr TPN  CONT IV  

Last administered on 3/21/20at 20:57;  Start 3/21/20 at 22:00;  Stop 3/22/20 at 

21:59;  Status DC


Sodium Chloride 90 meq/Potassium Chloride 15 meq/ Potassium Phosphate 15 mmol/ 

Magnesium Sulfate 10 meq/Calcium Gluconate 20 meq/ Multivitamins 10 ml/Chromium/

Copper/Manganese/ Seleni/Zn 0.5 ml/ Total Parenteral Nutrition/Amino 

Acids/Dextrose/ Fat Emulsion Intravenous 1,512 ml @  63 mls/hr TPN  CONT IV ;  

Start 3/22/20 at 22:00;  Stop 3/22/20 at 14:16;  Status DC


Sodium Chloride 90 meq/Potassium Chloride 15 meq/ Potassium Phosphate 15 mmol/ 

Magnesium Sulfate 10 meq/Calcium Gluconate 20 meq/ Multivitamins 10 ml/Chromium/

Copper/Manganese/ Seleni/Zn 0.5 ml/ Total Parenteral Nutrition/Amino 

Acids/Dextrose/ Fat Emulsion Intravenous 1,200 ml @  50 mls/hr TPN  CONT IV ;  

Start 3/22/20 at 22:00;  Stop 3/22/20 at 14:17;  Status DC


Sodium Chloride 90 meq/Potassium Chloride 15 meq/ Potassium Phosphate 10 mmol/ 

Magnesium Sulfate 10 meq/Calcium Gluconate 20 meq/ Multivitamins 10 ml/Chromium/

Copper/Manganese/ Seleni/Zn 0.5 ml/ Total Parenteral Nutrition/Amino 

Acids/Dextrose/ Fat Emulsion Intravenous 1,200 ml @  50 mls/hr TPN  CONT IV  

Last administered on 3/22/20at 23:29;  Start 3/22/20 at 22:00;  Stop 3/23/20 at 

21:59;  Status DC


Sodium Chloride 1,000 ml @  1,000 mls/hr Q1H PRN IV hypotension;  Start 3/23/20 

at 07:28;  Stop 3/23/20 at 13:27;  Status DC


Albumin Human 200 ml @  200 mls/hr 1X  ONCE IV  Last administered on 3/23/20at 

08:51;  Start 3/23/20 at 07:30;  Stop 3/23/20 at 08:29;  Status DC


Diphenhydramine HCl (Benadryl) 25 mg 1X PRN  PRN IV ITCHING;  Start 3/23/20 at 

07:30;  Stop 3/24/20 at 07:29;  Status DC


Diphenhydramine HCl (Benadryl) 25 mg 1X PRN  PRN IV ITCHING;  Start 3/23/20 at 

07:30;  Stop 3/24/20 at 07:29;  Status DC


Sodium Chloride 1,000 ml @  400 mls/hr Q2H30M PRN IV PATENCY;  Start 3/23/20 at 

07:28;  Stop 3/23/20 at 19:27;  Status DC


Info (PHARMACY MONITORING -- do not chart) 1 each PRN DAILY  PRN MC SEE 

COMMENTS;  Start 3/23/20 at 07:30;  Stop 4/3/20 at 13:01;  Status DC


Metronidazole 100 ml @  100 mls/hr Q6HRS IV  Last administered on 4/8/20at 

06:26;  Start 3/23/20 at 08:30;  Stop 4/8/20 at 09:58;  Status DC


Micafungin Sodium 100 mg/Dextrose 100 ml @  100 mls/hr Q24H IV  Last 

administered on 4/30/20at 08:18;  Start 3/23/20 at 09:00;  Stop 4/30/20 at 

20:58;  Status DC


Propofol 0 ml @ As Directed STK-MED ONCE IV ;  Start 3/23/20 at 07:53;  Stop 

3/23/20 at 07:53;  Status DC


Etomidate (Amidate) 20 mg STK-MED ONCE IV ;  Start 3/23/20 at 07:53;  Stop 

3/23/20 at 07:54;  Status DC


Midazolam HCl (Versed) 5 mg STK-MED ONCE .ROUTE ;  Start 3/23/20 at 07:57;  Stop

3/23/20 at 07:57;  Status DC


Fentanyl Citrate 30 ml @ 0 mls/hr CONT  PRN IV SEE PROTOCOL Last administered on

4/17/20at 06:12;  Start 3/23/20 at 08:15;  Stop 4/17/20 at 09:19;  Status DC


Artificial Tears (Artificial Tears) 1 drop PRN Q1HR  PRN OU DRY EYE, 1st choice;

 Start 3/23/20 at 08:15;  Stop 4/29/20 at 05:31;  Status DC


Midazolam HCl 50 mg/Sodium Chloride 50 ml @ 0 mls/hr CONT  PRN IV SEE PROTOCOL 

Last administered on 3/26/20at 22:39;  Start 3/23/20 at 08:15;  Stop 3/28/20 at 

15:59;  Status DC


Etomidate (Amidate) 8 mg 1X  ONCE IV  Last administered on 3/23/20at 08:33;  

Start 3/23/20 at 08:30;  Stop 3/23/20 at 08:31;  Status DC


Succinylcholine Chloride (Anectine) 120 mg 1X  ONCE IV  Last administered on 

3/23/20at 08:34;  Start 3/23/20 at 08:30;  Stop 3/23/20 at 08:31;  Status DC


Midazolam HCl (Versed) 5 mg 1X  ONCE IV ;  Start 3/23/20 at 08:30;  Stop 3/23/20

at 08:31;  Status DC


Potassium Chloride 15 meq/ Bicarbonate Dialysis Soln w/ out KCl 5,007.5 ml  @ 

1,000 mls/ hr Q5H1M IV  Last administered on 3/24/20at 11:11;  Start 3/23/20 at 

12:00;  Stop 3/24/20 at 11:15;  Status DC


Potassium Chloride 15 meq/ Bicarbonate Dialysis Soln w/ out KCl 5,007.5 ml  @ 

1,000 mls/ hr Q5H1M IV  Last administered on 3/24/20at 11:12;  Start 3/23/20 at 

12:00;  Stop 3/24/20 at 11:17;  Status DC


Potassium Chloride 15 meq/ Bicarbonate Dialysis Soln w/ out KCl 5,007.5 ml  @ 

1,000 mls/ hr Q5H1M IV  Last administered on 3/24/20at 11:11;  Start 3/23/20 at 

12:00;  Stop 3/24/20 at 11:19;  Status DC


Sodium Chloride 90 meq/Potassium Chloride 15 meq/ Potassium Phosphate 10 mmol/ 

Magnesium Sulfate 10 meq/Calcium Gluconate 20 meq/ Multivitamins 10 ml/Chromium/

Copper/Manganese/ Seleni/Zn 0.5 ml/ Total Parenteral Nutrition/Amino 

Acids/Dextrose/ Fat Emulsion Intravenous 1,400 ml @  58.333 mls/ hr TPN  CONT IV

 Last administered on 3/23/20at 21:42;  Start 3/23/20 at 22:00;  Stop 3/24/20 at

21:59;  Status DC


Heparin Sodium (Porcine) (Heparin Sodium) 5,000 unit Q8HRS SQ  Last administered

on 3/28/20at 05:55;  Start 3/23/20 at 15:00;  Stop 3/28/20 at 13:28;  Status DC


Meropenem 500 mg/ Sodium Chloride 50 ml @  100 mls/hr Q6HRS IV  Last 

administered on 3/25/20at 06:00;  Start 3/24/20 at 09:00;  Stop 3/25/20 at 

07:29;  Status DC


Potassium Phosphate 20 mmol/ Sodium Chloride 106.6667 ml @  51.667 m... 1X  ONCE

IV  Last administered on 3/24/20at 11:22;  Start 3/24/20 at 10:15;  Stop 3/24/20

at 12:18;  Status DC


Acetaminophen (Tylenol Supp) 650 mg PRN Q6HRS  PRN RI MILD PAIN / TEMP > 100.3'F

Last administered on 7/15/20at 19:52;  Start 3/24/20 at 10:30


Potassium Chloride/Water 100 ml @  100 mls/hr Q1H IV  Last administered on 

3/24/20at 12:12;  Start 3/24/20 at 11:00;  Stop 3/24/20 at 12:59;  Status DC


Potassium Chloride 20 meq/ Bicarbonate Dialysis Soln w/ out KCl 5,010 ml @  

1,000 mls/hr Q5H1M IV  Last administered on 3/25/20at 08:48;  Start 3/24/20 at 

12:00;  Stop 3/25/20 at 13:03;  Status DC


Potassium Chloride 20 meq/ Bicarbonate Dialysis Soln w/ out KCl 5,010 ml @  

1,000 mls/hr Q5H1M IV  Last administered on 3/29/20at 14:52;  Start 3/24/20 at 

11:30;  Stop 3/29/20 at 19:59;  Status DC


Potassium Chloride 20 meq/ Bicarbonate Dialysis Soln w/ out KCl 5,010 ml @  

1,000 mls/hr Q5H1M IV  Last administered on 3/29/20at 14:53;  Start 3/24/20 at 

11:30;  Stop 3/29/20 at 19:59;  Status DC


Sodium Chloride 90 meq/Potassium Chloride 15 meq/ Potassium Phosphate 15 mmol/ 

Magnesium Sulfate 10 meq/Calcium Gluconate 15 meq/ Multivitamins 10 ml/Chromium/

Copper/Manganese/ Seleni/Zn 0.5 ml/ Total Parenteral Nutrition/Amino 

Acids/Dextrose/ Fat Emulsion Intravenous 1,400 ml @  58.333 mls/ hr TPN  CONT IV

 Last administered on 3/24/20at 22:17;  Start 3/24/20 at 22:00;  Stop 3/25/20 at

21:59;  Status DC


Cefepime HCl (Maxipime) 2 gm Q12HR IVP  Last administered on 4/7/20at 20:56;  

Start 3/25/20 at 09:00;  Stop 4/8/20 at 09:58;  Status DC


Daptomycin 500 mg/ Sodium Chloride 50 ml @  100 mls/hr Q48H IV  Last 

administered on 4/10/20at 09:57;  Start 3/25/20 at 08:30;  Stop 4/10/20 at 

10:07;  Status DC


Lidocaine HCl (Buffered Lidocaine 1%) 3 ml 1X  ONCE INJ  Last administered on 

3/25/20at 10:27;  Start 3/25/20 at 10:30;  Stop 3/25/20 at 10:31;  Status DC


Potassium Phosphate 20 mmol/ Sodium Chloride 106.6667 ml @  51.667 m... 1X  ONCE

IV  Last administered on 3/25/20at 12:51;  Start 3/25/20 at 13:00;  Stop 3/25/20

at 15:03;  Status DC


Sodium Chloride 90 meq/Potassium Chloride 15 meq/ Potassium Phosphate 18 mmol/ 

Magnesium Sulfate 8 meq/Calcium Gluconate 15 meq/ Multivitamins 10 ml/Chromium/ 

Copper/Manganese/ Seleni/Zn 0.5 ml/ Total Parenteral Nutrition/Amino 

Acids/Dextrose/ Fat Emulsion Intravenous 1,400 ml @  58.333 mls/ hr TPN  CONT IV

 Last administered on 3/25/20at 22:16;  Start 3/25/20 at 22:00;  Stop 3/26/20 at

21:59;  Status DC


Potassium Chloride 20 meq/ Bicarbonate Dialysis Soln w/ out KCl 5,010 ml @  

1,000 mls/hr Q5H1M IV  Last administered on 3/29/20at 14:54;  Start 3/25/20 at 

16:00;  Stop 3/29/20 at 19:59;  Status DC


Multi-Ingred Cream/Lotion/Oil/ Oint (Artificial Tears Eye Ointment) 1 thomas PRN 

Q1HR  PRN OU DRY EYE, 2nd choice Last administered on 4/13/20at 08:19;  Start 

3/25/20 at 17:30;  Stop 6/3/20 at 14:39;  Status DC


Sodium Chloride 90 meq/Potassium Chloride 15 meq/ Potassium Phosphate 18 mmol/ 

Magnesium Sulfate 8 meq/Calcium Gluconate 15 meq/ Multivitamins 10 ml/Chromium/ 

Copper/Manganese/ Seleni/Zn 0.5 ml/ Total Parenteral Nutrition/Amino 

Acids/Dextrose/ Fat Emulsion Intravenous 1,400 ml @  58.333 mls/ hr TPN  CONT IV

 Last administered on 3/26/20at 22:00;  Start 3/26/20 at 22:00;  Stop 3/27/20 at

21:59;  Status DC


Albumin Human 500 ml @  125 mls/hr 1X  ONCE IV ;  Start 3/26/20 at 14:15;  Stop 

3/26/20 at 18:14;  Status DC


Sodium Chloride 90 meq/Potassium Chloride 15 meq/ Potassium Phosphate 18 mmol/ 

Magnesium Sulfate 8 meq/Calcium Gluconate 15 meq/ Multivitamins 10 ml/Chromium/ 

Copper/Manganese/ Seleni/Zn 0.5 ml/ Insulin Human Regular 10 unit/ Total 

Parenteral Nutrition/Amino Acids/Dextrose/ Fat Emulsion Intravenous 1,400 ml @  

58.333 mls/ hr TPN  CONT IV  Last administered on 3/27/20at 21:43;  Start 3/27/

20 at 22:00;  Stop 3/28/20 at 21:59;  Status DC


Lidocaine HCl (Buffered Lidocaine 1%) 3 ml STK-MED ONCE .ROUTE ;  Start 3/25/20 

at 10:00;  Stop 3/27/20 at 13:57;  Status DC


Midazolam HCl 100 mg/Sodium Chloride 100 ml @ 7 mls/hr CONT  PRN IV SEE PROTOCOL

Last administered on 4/8/20at 15:35;  Start 3/28/20 at 16:00;  Stop 6/3/20 at 

14:38;  Status DC


Sodium Chloride 90 meq/Potassium Chloride 15 meq/ Potassium Phosphate 18 mmol/ 

Magnesium Sulfate 8 meq/Calcium Gluconate 15 meq/ Multivitamins 10 ml/Chromium/ 

Copper/Manganese/ Seleni/Zn 0.5 ml/ Insulin Human Regular 15 unit/ Total 

Parenteral Nutrition/Amino Acids/Dextrose/ Fat Emulsion Intravenous 1,400 ml @  

58.333 mls/ hr TPN  CONT IV  Last administered on 3/28/20at 20:34;  Start 

3/28/20 at 22:00;  Stop 3/29/20 at 21:59;  Status DC


Info ([facility] Electrolyte Protocol) 1 ea CONT PRN  PRN MC PER PROTOCOL;  Start 

3/29/20 at 13:15


Sodium Chloride 90 meq/Potassium Chloride 15 meq/ Potassium Phosphate 18 mmol/ 

Magnesium Sulfate 8 meq/Calcium Gluconate 15 meq/ Multivitamins 10 ml/Chromium/ 

Copper/Manganese/ Seleni/Zn 0.5 ml/ Insulin Human Regular 15 unit/ Total 

Parenteral Nutrition/Amino Acids/Dextrose/ Fat Emulsion Intravenous 1,400 ml @  

58.333 mls/ hr TPN  CONT IV  Last administered on 3/29/20at 22:05;  Start 

3/29/20 at 22:00;  Stop 3/30/20 at 21:59;  Status DC


Potassium Chloride 15 meq/ Bicarbonate Dialysis Soln w/ out KCl 5,007.5 ml  @ 

1,000 mls/ hr Q5H1M IV  Last administered on 4/1/20at 18:14;  Start 3/29/20 at 

20:00;  Stop 4/2/20 at 13:08;  Status DC


Potassium Chloride 15 meq/ Bicarbonate Dialysis Soln w/ out KCl 5,007.5 ml  @ 

1,000 mls/ hr Q5H1M IV  Last administered on 4/1/20at 18:14;  Start 3/29/20 at 

20:00;  Stop 4/2/20 at 13:08;  Status DC


Potassium Chloride 15 meq/ Bicarbonate Dialysis Soln w/ out KCl 5,007.5 ml  @ 

1,000 mls/ hr Q5H1M IV  Last administered on 4/1/20at 18:14;  Start 3/29/20 at 

20:00;  Stop 4/2/20 at 13:08;  Status DC


Iohexol (Omnipaque 240 Mg/ml) 30 ml 1X  ONCE PO  Last administered on 3/30/20at 

11:30;  Start 3/30/20 at 11:30;  Stop 3/30/20 at 11:33;  Status DC


Info (CONTRAST GIVEN -- Rx MONITORING) 1 each PRN DAILY  PRN MC SEE COMMENTS;  

Start 3/30/20 at 11:45;  Stop 4/1/20 at 11:44;  Status DC


Sodium Chloride 90 meq/Potassium Chloride 15 meq/ Potassium Phosphate 18 mmol/ 

Magnesium Sulfate 8 meq/Calcium Gluconate 15 meq/ Multivitamins 10 ml/Chromium/ 

Copper/Manganese/ Seleni/Zn 0.5 ml/ Insulin Human Regular 15 unit/ Total Pa

renteral Nutrition/Amino Acids/Dextrose/ Fat Emulsion Intravenous 1,400 ml @  

58.333 mls/ hr TPN  CONT IV  Last administered on 3/30/20at 21:47;  Start 

3/30/20 at 22:00;  Stop 3/31/20 at 21:59;  Status DC


Sodium Chloride 90 meq/Potassium Chloride 15 meq/ Potassium Phosphate 18 mmol/ 

Magnesium Sulfate 8 meq/Calcium Gluconate 15 meq/ Multivitamins 10 ml/Chromium/ 

Copper/Manganese/ Seleni/Zn 0.5 ml/ Insulin Human Regular 20 unit/ Total 

Parenteral Nutrition/Amino Acids/Dextrose/ Fat Emulsion Intravenous 1,400 ml @  

58.333 mls/ hr TPN  CONT IV  Last administered on 3/31/20at 21:36;  Start 

3/31/20 at 22:00;  Stop 4/1/20 at 21:59;  Status DC


Alteplase, Recombinant (Cathflo For Central Catheter Clearance) 1 mg 1X  ONCE 

INT CAT  Last administered on 3/31/20at 20:03;  Start 3/31/20 at 19:30;  Stop 

3/31/20 at 19:46;  Status DC


Alteplase, Recombinant (Cathflo For Central Catheter Clearance) 1 mg 1X  ONCE 

INT CAT  Last administered on 3/31/20at 22:05;  Start 3/31/20 at 22:00;  Stop 

3/31/20 at 22:01;  Status DC


Sodium Chloride 90 meq/Potassium Chloride 15 meq/ Potassium Phosphate 18 mmol/ 

Magnesium Sulfate 8 meq/Calcium Gluconate 15 meq/ Multivitamins 10 ml/Chromium/ 

Copper/Manganese/ Seleni/Zn 0.5 ml/ Insulin Human Regular 20 unit/ Total 

Parenteral Nutrition/Amino Acids/Dextrose/ Fat Emulsion Intravenous 1,400 ml @  

58.333 mls/ hr TPN  CONT IV  Last administered on 4/1/20at 21:30;  Start 4/1/20 

at 22:00;  Stop 4/2/20 at 21:59;  Status DC


Dexmedetomidine HCl 400 mcg/ Sodium Chloride 100 ml @ 0 mls/hr CONT  PRN IV 

ANXIETY / AGITATION Last administered on 5/30/20at 12:57;  Start 4/2/20 at 

08:15;  Stop 5/30/20 at 18:31;  Status DC


Sodium Chloride 500 ml @  500 mls/hr 1X PRN  PRN IV ELEVATED BP, SEE COMMENTS;  

Start 4/2/20 at 08:15


Atropine Sulfate (ATROPINE 0.5mg SYRINGE) 0.5 mg PRN Q5MIN  PRN IV SEE COMMENTS;

 Start 4/2/20 at 08:15


Furosemide (Lasix) 20 mg 1X  ONCE IVP  Last administered on 4/2/20at 08:19;  

Start 4/2/20 at 08:15;  Stop 4/2/20 at 08:16;  Status DC


Lidocaine HCl (Buffered Lidocaine 1%) 3 ml STK-MED ONCE .ROUTE ;  Start 4/2/20 

at 08:39;  Stop 4/2/20 at 08:39;  Status DC


Lidocaine HCl (Buffered Lidocaine 1%) 6 ml 1X  ONCE INJ  Last administered on 

4/2/20at 09:05;  Start 4/2/20 at 09:00;  Stop 4/2/20 at 09:06;  Status DC


Sodium Chloride 90 meq/Potassium Chloride 15 meq/ Potassium Phosphate 18 mmol/ 

Magnesium Sulfate 8 meq/Calcium Gluconate 15 meq/ Multivitamins 10 ml/Chromium/ 

Copper/Manganese/ Seleni/Zn 0.5 ml/ Insulin Human Regular 20 unit/ Total 

Parenteral Nutrition/Amino Acids/Dextrose/ Fat Emulsion Intravenous 1,400 ml @  

58.333 mls/ hr TPN  CONT IV  Last administered on 4/2/20at 22:45;  Start 4/2/20 

at 22:00;  Stop 4/3/20 at 21:59;  Status DC


Sodium Chloride 1,000 ml @  1,000 mls/hr Q1H PRN IV hypotension;  Start 4/3/20 

at 07:30;  Stop 4/3/20 at 13:29;  Status DC


Albumin Human 200 ml @  200 mls/hr 1X PRN  PRN IV Hypotension Last administered 

on 4/3/20at 09:36;  Start 4/3/20 at 07:30;  Stop 4/3/20 at 13:29;  Status DC


Sodium Chloride (Normal Saline Flush) 10 ml 1X PRN  PRN IV AP catheter pack;  

Start 4/3/20 at 07:30;  Stop 4/3/20 at 21:29;  Status DC


Sodium Chloride (Normal Saline Flush) 10 ml 1X PRN  PRN IV  catheter pack;  

Start 4/3/20 at 07:30;  Stop 4/4/20 at 07:29;  Status DC


Sodium Chloride 1,000 ml @  400 mls/hr Q2H30M PRN IV PATENCY;  Start 4/3/20 at 

07:30;  Stop 4/3/20 at 19:29;  Status DC


Info (PHARMACY MONITORING -- do not chart) 1 each PRN DAILY  PRN MC SEE 

COMMENTS;  Start 4/3/20 at 07:30;  Stop 4/3/20 at 13:02;  Status DC


Info (PHARMACY MONITORING -- do not chart) 1 each PRN DAILY  PRN MC SEE 

COMMENTS;  Start 4/3/20 at 07:30;  Stop 4/5/20 at 12:45;  Status DC


Sodium Chloride 90 meq/Potassium Chloride 15 meq/ Potassium Phosphate 10 mmol/ 

Magnesium Sulfate 8 meq/Calcium Gluconate 15 meq/ Multivitamins 10 ml/Chromium/ 

Copper/Manganese/ Seleni/Zn 0.5 ml/ Insulin Human Regular 25 unit/ Total 

Parenteral Nutrition/Amino Acids/Dextrose/ Fat Emulsion Intravenous 1,400 ml @  

58.333 mls/ hr TPN  CONT IV  Last administered on 4/3/20at 22:19;  Start 4/3/20 

at 22:00;  Stop 4/4/20 at 21:59;  Status DC


Heparin Sodium (Porcine) (Heparin Sodium) 5,000 unit Q12HR SQ  Last administered

on 4/26/20at 08:59;  Start 4/3/20 at 21:00;  Stop 4/26/20 at 10:05;  Status DC


Ondansetron HCl (Zofran) 4 mg PRN Q6HRS  PRN IV NAUSEA/VOMITING;  Start 4/6/20 

at 07:00;  Stop 4/7/20 at 06:59;  Status DC


Fentanyl Citrate (Fentanyl 2ml Vial) 25 mcg PRN Q5MIN  PRN IV MILD PAIN 1-3;  

Start 4/6/20 at 07:00;  Stop 4/7/20 at 06:59;  Status DC


Fentanyl Citrate (Fentanyl 2ml Vial) 50 mcg PRN Q5MIN  PRN IV MODERATE TO SEVERE

PAIN;  Start 4/6/20 at 07:00;  Stop 4/7/20 at 06:59;  Status DC


Ringer's Solution 1,000 ml @  30 mls/hr Q24H IV ;  Start 4/6/20 at 07:00;  Stop 

4/6/20 at 18:59;  Status DC


Lidocaine HCl (Xylocaine-Mpf 1% 2ml Vial) 2 ml PRN 1X  PRN ID PRIOR TO IV START;

 Start 4/6/20 at 07:00;  Stop 4/7/20 at 06:59;  Status DC


Prochlorperazine Edisylate (Compazine) 5 mg PACU PRN  PRN IV NAUSEA, MRX1;  

Start 4/6/20 at 07:00;  Stop 4/7/20 at 06:59;  Status DC


Sodium Chloride 1,000 ml @  1,000 mls/hr Q1H PRN IV hypotension;  Start 4/4/20 

at 09:10;  Stop 4/4/20 at 15:09;  Status DC


Albumin Human 200 ml @  200 mls/hr 1X PRN  PRN IV Hypotension Last administered 

on 4/4/20at 10:10;  Start 4/4/20 at 09:15;  Stop 4/4/20 at 15:14;  Status DC


Sodium Chloride 1,000 ml @  400 mls/hr Q2H30M PRN IV PATENCY;  Start 4/4/20 at 

09:10;  Stop 4/4/20 at 21:09;  Status DC


Info (PHARMACY MONITORING -- do not chart) 1 each PRN DAILY  PRN MC SEE 

COMMENTS;  Start 4/4/20 at 09:15;  Stop 4/5/20 at 12:45;  Status DC


Info (PHARMACY MONITORING -- do not chart) 1 each PRN DAILY  PRN MC SEE 

COMMENTS;  Start 4/4/20 at 09:15;  Stop 4/5/20 at 12:45;  Status DC


Sodium Chloride 90 meq/Potassium Chloride 15 meq/ Potassium Phosphate 10 mmol/ 

Magnesium Sulfate 8 meq/Calcium Gluconate 15 meq/ Multivitamins 10 ml/Chromium/ 

Copper/Manganese/ Seleni/Zn 0.5 ml/ Insulin Human Regular 25 unit/ Total 

Parenteral Nutrition/Amino Acids/Dextrose/ Fat Emulsion Intravenous 1,400 ml @  

58.333 mls/ hr TPN  CONT IV  Last administered on 4/4/20at 22:10;  Start 4/4/20 

at 22:00;  Stop 4/5/20 at 21:59;  Status DC


Magnesium Sulfate 50 ml @ 25 mls/hr PRN DAILY  PRN IV for Mag < 1.7 on am labs 

Last administered on 6/18/20at 10:57;  Start 4/5/20 at 09:15


Sodium Chloride 90 meq/Potassium Chloride 15 meq/ Potassium Phosphate 10 mmol/ 

Magnesium Sulfate 8 meq/Calcium Gluconate 15 meq/ Multivitamins 10 ml/Chromium/ 

Copper/Manganese/ Seleni/Zn 0.5 ml/ Insulin Human Regular 25 unit/ Total 

Parenteral Nutrition/Amino Acids/Dextrose/ Fat Emulsion Intravenous 1,400 ml @  

58.333 mls/ hr TPN  CONT IV  Last administered on 4/5/20at 21:20;  Start 4/5/20 

at 22:00;  Stop 4/6/20 at 21:59;  Status DC


Sodium Chloride 1,000 ml @  1,000 mls/hr Q1H PRN IV hypotension;  Start 4/5/20 

at 12:23;  Stop 4/5/20 at 18:22;  Status DC


Albumin Human 200 ml @  200 mls/hr 1X  ONCE IV  Last administered on 4/5/20at 

13:34;  Start 4/5/20 at 12:30;  Stop 4/5/20 at 13:29;  Status DC


Diphenhydramine HCl (Benadryl) 25 mg 1X PRN  PRN IV ITCHING;  Start 4/5/20 at 

12:30;  Stop 4/6/20 at 12:29;  Status DC


Diphenhydramine HCl (Benadryl) 25 mg 1X PRN  PRN IV ITCHING;  Start 4/5/20 at 

12:30;  Stop 4/6/20 at 12:29;  Status DC


Info (PHARMACY MONITORING -- do not chart) 1 each PRN DAILY  PRN MC SEE MIKEY

TS;  Start 4/5/20 at 12:30;  Status Cancel


Bupivacaine HCl/ Epinephrine Bitart (Sensorcain-Epi 0.5%-1:[id number] Mpf) 30 ml 

STK-MED ONCE .ROUTE  Last administered on 4/6/20at 11:44;  Start 4/6/20 at 

11:00;  Stop 4/6/20 at 11:01;  Status DC


Cellulose (Surgicel Fibrillar 1x2) 1 each STK-MED ONCE .ROUTE ;  Start 4/6/20 at

11:00;  Stop 4/6/20 at 11:01;  Status DC


Sodium Chloride 90 meq/Potassium Chloride 15 meq/ Potassium Phosphate 10 mmol/ 

Magnesium Sulfate 12 meq/Calcium Gluconate 15 meq/ Multivitamins 10 ml/Chromium/

Copper/Manganese/ Seleni/Zn 0.5 ml/ Insulin Human Regular 25 unit/ Total 

Parenteral Nutrition/Amino Acids/Dextrose/ Fat Emulsion Intravenous 1,400 ml @  

58.333 mls/ hr TPN  CONT IV  Last administered on 4/6/20at 22:24;  Start 4/6/20 

at 22:00;  Stop 4/7/20 at 21:59;  Status DC


Propofol 20 ml @ As Directed STK-MED ONCE IV ;  Start 4/6/20 at 11:07;  Stop 

4/6/20 at 11:07;  Status DC


Cellulose (Surgicel Hemostat 4x8) 1 each STK-MED ONCE .ROUTE  Last administered 

on 4/6/20at 11:44;  Start 4/6/20 at 11:55;  Stop 4/6/20 at 11:56;  Status DC


Sevoflurane (Ultane) 60 ml STK-MED ONCE IH ;  Start 4/6/20 at 12:46;  Stop 

4/6/20 at 12:46;  Status DC


Sodium Chloride 1,000 ml @  1,000 mls/hr Q1H PRN IV hypotension;  Start 4/6/20 

at 13:51;  Stop 4/6/20 at 19:50;  Status DC


Albumin Human 200 ml @  200 mls/hr 1X PRN  PRN IV Hypotension Last administered 

on 4/6/20at 14:51;  Start 4/6/20 at 14:00;  Stop 4/6/20 at 19:59;  Status DC


Diphenhydramine HCl (Benadryl) 25 mg 1X PRN  PRN IV ITCHING;  Start 4/6/20 at 

14:00;  Stop 4/7/20 at 13:59;  Status DC


Diphenhydramine HCl (Benadryl) 25 mg 1X PRN  PRN IV ITCHING;  Start 4/6/20 at 

14:00;  Stop 4/7/20 at 13:59;  Status DC


Sodium Chloride 1,000 ml @  400 mls/hr Q2H30M PRN IV PATENCY;  Start 4/6/20 at 

13:51;  Stop 4/7/20 at 01:50;  Status DC


Info (PHARMACY MONITORING -- do not chart) 1 each PRN DAILY  PRN MC SEE 

COMMENTS;  Start 4/6/20 at 14:00;  Stop 4/9/20 at 08:16;  Status DC


Heparin Sodium (Porcine) (Hep Lock Adult) 500 unit STK-MED ONCE IVP ;  Start 

4/7/20 at 09:29;  Stop 4/7/20 at 09:30;  Status DC


Sodium Chloride 1,000 ml @  1,000 mls/hr Q1H PRN IV hypotension;  Start 4/7/20 

at 10:43;  Stop 4/7/20 at 16:42;  Status DC


Sodium Chloride 1,000 ml @  400 mls/hr Q2H30M PRN IV PATENCY;  Start 4/7/20 at 

10:43;  Stop 4/7/20 at 22:42;  Status DC


Info (PHARMACY MONITORING -- do not chart) 1 each PRN DAILY  PRN MC SEE 

COMMENTS;  Start 4/7/20 at 10:45;  Status UNV


Info (PHARMACY MONITORING -- do not chart) 1 each PRN DAILY  PRN MC SEE 

COMMENTS;  Start 4/7/20 at 10:45;  Status UNV


Sodium Chloride 90 meq/Potassium Chloride 15 meq/ Magnesium Sulfate 12 

meq/Calcium Gluconate 15 meq/ Multivitamins 10 ml/Chromium/ Copper/Manganese/ 

Seleni/Zn 0.5 ml/ Insulin Human Regular 25 unit/ Total Parenteral 

Nutrition/Amino Acids/Dextrose/ Fat Emulsion Intravenous 1,400 ml @  58.333 mls/

hr TPN  CONT IV  Last administered on 4/7/20at 22:13;  Start 4/7/20 at 22:00;  

Stop 4/8/20 at 21:59;  Status DC


Sodium Chloride 1,000 ml @  1,000 mls/hr Q1H PRN IV hypotension;  Start 4/8/20 

at 07:50;  Stop 4/8/20 at 13:49;  Status DC


Albumin Human 200 ml @  200 mls/hr 1X  ONCE IV ;  Start 4/8/20 at 08:00;  Stop 

4/8/20 at 08:53;  Status DC


Diphenhydramine HCl (Benadryl) 25 mg 1X PRN  PRN IV ITCHING;  Start 4/8/20 at 

08:00;  Stop 4/9/20 at 07:59;  Status DC


Diphenhydramine HCl (Benadryl) 25 mg 1X PRN  PRN IV ITCHING;  Start 4/8/20 at 

08:00;  Stop 4/9/20 at 07:59;  Status DC


Info (PHARMACY MONITORING -- do not chart) 1 each PRN DAILY  PRN MC SEE 

COMMENTS;  Start 4/8/20 at 08:00;  Stop 4/9/20 at 08:16;  Status DC


Albumin Human 50 ml @ 50 mls/hr 1X  ONCE IV ;  Start 4/8/20 at 08:53;  Stop 

4/8/20 at 08:56;  Status DC


Albumin Human 200 ml @  50 mls/hr PRN 1X  PRN IV HYPOTENSION Last administered 

on 4/14/20at 11:54;  Start 4/8/20 at 09:00;  Stop 5/21/20 at 11:14;  Status DC


Meropenem 500 mg/ Sodium Chloride 50 ml @  100 mls/hr Q12H IV  Last administered

on 4/28/20at 10:45;  Start 4/8/20 at 10:00;  Stop 4/28/20 at 12:37;  Status DC


Sodium Chloride 90 meq/Magnesium Sulfate 12 meq/ Calcium Gluconate 15 meq/ 

Multivitamins 10 ml/Chromium/ Copper/Manganese/ Seleni/Zn 0.5 ml/ Insulin Human 

Regular 25 unit/ Total Parenteral Nutrition/Amino Acids/Dextrose/ Fat Emulsion 

Intravenous 1,400 ml @  58.333 mls/ hr TPN  CONT IV  Last administered on 

4/8/20at 21:41;  Start 4/8/20 at 22:00;  Stop 4/9/20 at 21:59;  Status DC


Sodium Chloride 1,000 ml @  1,000 mls/hr Q1H PRN IV hypotension;  Start 4/9/20 

at 07:58;  Stop 4/9/20 at 13:57;  Status DC


Albumin Human 200 ml @  200 mls/hr 1X PRN  PRN IV Hypotension Last administered 

on 4/9/20at 09:30;  Start 4/9/20 at 08:00;  Stop 4/9/20 at 13:59;  Status DC


Sodium Chloride 1,000 ml @  400 mls/hr Q2H30M PRN IV PATENCY;  Start 4/9/20 at 

07:58;  Stop 4/9/20 at 19:57;  Status DC


Info (PHARMACY MONITORING -- do not chart) 1 each PRN DAILY  PRN MC SEE 

COMMENTS;  Start 4/9/20 at 08:00;  Status Cancel


Info (PHARMACY MONITORING -- do not chart) 1 each PRN DAILY  PRN MC SEE 

COMMENTS;  Start 4/9/20 at 08:15;  Status UNV


Sodium Chloride 90 meq/Potassium Phosphate 5 mmol/ Magnesium Sulfate 12 

meq/Calcium Gluconate 15 meq/ Multivitamins 10 ml/Chromium/ Copper/Manganese/ 

Seleni/Zn 0.5 ml/ Insulin Human Regular 30 unit/ Total Parenteral 

Nutrition/Amino Acids/Dextrose/ Fat Emulsion Intravenous 1,400 ml @  58.333 mls/

hr TPN  CONT IV  Last administered on 4/9/20at 22:08;  Start 4/9/20 at 22:00;  

Stop 4/10/20 at 21:59;  Status DC


Linezolid/Dextrose 300 ml @  300 mls/hr Q12HR IV  Last administered on 4/20/20at

20:40;  Start 4/10/20 at 11:00;  Stop 4/21/20 at 08:10;  Status DC


Sodium Chloride 90 meq/Potassium Phosphate 15 mmol/ Magnesium Sulfate 12 

meq/Calcium Gluconate 15 meq/ Multivitamins 10 ml/Chromium/ Copper/Manganese/ 

Seleni/Zn 0.5 ml/ Insulin Human Regular 30 unit/ Total Parenteral 

Nutrition/Amino Acids/Dextrose/ Fat Emulsion Intravenous 1,400 ml @  58.333 mls/

hr TPN  CONT IV  Last administered on 4/10/20at 21:49;  Start 4/10/20 at 22:00; 

Stop 4/11/20 at 21:59;  Status DC


Sodium Chloride 90 meq/Potassium Phosphate 15 mmol/ Magnesium Sulfate 12 

meq/Calcium Gluconate 15 meq/ Multivitamins 10 ml/Chromium/ Copper/Manganese/ 

Seleni/Zn 0.5 ml/ Insulin Human Regular 40 unit/ Total Parenteral 

Nutrition/Amino Acids/Dextrose/ Fat Emulsion Intravenous 1,400 ml @  58.333 mls/

hr TPN  CONT IV  Last administered on 4/11/20at 21:21;  Start 4/11/20 at 22:00; 

Stop 4/12/20 at 21:59;  Status DC


Sodium Chloride 1,000 ml @  1,000 mls/hr Q1H PRN IV hypotension;  Start 4/11/20 

at 13:26;  Stop 4/11/20 at 19:25;  Status DC


Albumin Human 200 ml @  200 mls/hr 1X PRN  PRN IV Hypotension Last administered 

on 4/11/20at 15:00;  Start 4/11/20 at 13:30;  Stop 4/11/20 at 19:29;  Status DC


Sodium Chloride (Normal Saline Flush) 10 ml 1X PRN  PRN IV AP catheter pack;  

Start 4/11/20 at 13:30;  Stop 4/12/20 at 13:29;  Status DC


Sodium Chloride (Normal Saline Flush) 10 ml 1X PRN  PRN IV  catheter pack;  

Start 4/11/20 at 13:30;  Stop 4/12/20 at 13:29;  Status DC


Sodium Chloride 1,000 ml @  400 mls/hr Q2H30M PRN IV PATENCY;  Start 4/11/20 at 

13:26;  Stop 4/12/20 at 01:25;  Status DC


Info (PHARMACY MONITORING -- do not chart) 1 each PRN DAILY  PRN MC SEE 

COMMENTS;  Start 4/11/20 at 13:30;  Stop 4/11/20 at 13:33;  Status DC


Info (PHARMACY MONITORING -- do not chart) 1 each PRN DAILY  PRN MC SEE 

COMMENTS;  Start 4/11/20 at 13:30;  Stop 4/11/20 at 13:34;  Status DC


Sodium Chloride 90 meq/Potassium Phosphate 19 mmol/ Magnesium Sulfate 12 

meq/Calcium Gluconate 15 meq/ Multivitamins 10 ml/Chromium/ Copper/Manganese/ 

Seleni/Zn 0.5 ml/ Insulin Human Regular 40 unit/ Total Parenteral 

Nutrition/Amino Acids/Dextrose/ Fat Emulsion Intravenous 1,400 ml @  58.333 mls/

hr TPN  CONT IV  Last administered on 4/12/20at 21:54;  Start 4/12/20 at 22:00; 

Stop 4/13/20 at 21:59;  Status DC


Sodium Chloride 1,000 ml @  1,000 mls/hr Q1H PRN IV hypotension;  Start 4/13/20 

at 09:35;  Stop 4/13/20 at 15:34;  Status DC


Albumin Human 200 ml @  200 mls/hr 1X PRN  PRN IV Hypotension;  Start 4/13/20 at

09:45;  Stop 4/13/20 at 15:44;  Status DC


Diphenhydramine HCl (Benadryl) 25 mg 1X PRN  PRN IV ITCHING;  Start 4/13/20 at 

09:45;  Stop 4/14/20 at 09:44;  Status DC


Diphenhydramine HCl (Benadryl) 25 mg 1X PRN  PRN IV ITCHING;  Start 4/13/20 at 

09:45;  Stop 4/14/20 at 09:44;  Status DC


Sodium Chloride 1,000 ml @  400 mls/hr Q2H30M PRN IV PATENCY;  Start 4/13/20 at 

09:35;  Stop 4/13/20 at 21:34;  Status DC


Info (PHARMACY MONITORING -- do not chart) 1 each PRN DAILY  PRN MC SEE 

COMMENTS;  Start 4/13/20 at 09:45;  Status Cancel


Sodium Chloride 100 meq/Potassium Phosphate 19 mmol/ Magnesium Sulfate 12 me

q/Calcium Gluconate 15 meq/ Multivitamins 10 ml/Chromium/ Copper/Manganese/ 

Seleni/Zn 0.5 ml/ Insulin Human Regular 40 unit/ Potassium Chloride 20 meq/ 

Total Parenteral Nutrition/Amino Acids/Dextrose/ Fat Emulsion Intravenous 1,400 

ml @  58.333 mls/ hr TPN  CONT IV  Last administered on 4/13/20at 22:02;  Start 

4/13/20 at 22:00;  Stop 4/14/20 at 21:59;  Status DC


Furosemide (Lasix) 40 mg 1X  ONCE IVP  Last administered on 4/13/20at 14:39;  

Start 4/13/20 at 14:30;  Stop 4/13/20 at 14:31;  Status DC


Metronidazole 100 ml @  100 mls/hr Q8HRS IV  Last administered on 4/21/20at 

06:04;  Start 4/14/20 at 10:00;  Stop 4/21/20 at 08:10;  Status DC


Sodium Chloride 1,000 ml @  1,000 mls/hr Q1H PRN IV hypotension;  Start 4/14/20 

at 08:00;  Stop 4/14/20 at 13:59;  Status DC


Albumin Human 200 ml @  200 mls/hr 1X PRN  PRN IV Hypotension;  Start 4/14/20 at

08:00;  Stop 4/14/20 at 13:59;  Status DC


Sodium Chloride 1,000 ml @  400 mls/hr Q2H30M PRN IV PATENCY;  Start 4/14/20 at 

08:00;  Stop 4/14/20 at 19:59;  Status DC


Info (PHARMACY MONITORING -- do not chart) 1 each PRN DAILY  PRN MC SEE 

COMMENTS;  Start 4/14/20 at 11:30;  Status UNV


Info (PHARMACY MONITORING -- do not chart) 1 each PRN DAILY  PRN MC SEE 

COMMENTS;  Start 4/14/20 at 11:30;  Stop 4/16/20 at 12:13;  Status DC


Sodium Chloride 100 meq/Potassium Phosphate 19 mmol/ Magnesium Sulfate 12 

meq/Calcium Gluconate 15 meq/ Multivitamins 10 ml/Chromium/ Copper/Manganese/ 

Seleni/Zn 0.5 ml/ Insulin Human Regular 40 unit/ Potassium Chloride 20 meq/ 

Total Parenteral Nutrition/Amino Acids/Dextrose/ Fat Emulsion Intravenous 1,400 

ml @  58.333 mls/ hr TPN  CONT IV  Last administered on 4/14/20at 21:52;  Start 

4/14/20 at 22:00;  Stop 4/15/20 at 21:59;  Status DC


Sodium Chloride (Normal Saline Flush) 10 ml QSHIFT  PRN IV AFTER MEDS AND BLOOD 

DRAWS;  Start 4/14/20 at 15:00;  Stop 5/12/20 at 11:27;  Status DC


Sodium Chloride (Normal Saline Flush) 10 ml PRN Q5MIN  PRN IV AFTER MEDS AND 

BLOOD DRAWS;  Start 4/14/20 at 15:00


Sodium Chloride (Normal Saline Flush) 20 ml PRN Q5MIN  PRN IV AFTER MEDS AND 

BLOOD DRAWS;  Start 4/14/20 at 15:00


Sodium Chloride 100 meq/Potassium Phosphate 19 mmol/ Magnesium Sulfate 12 

meq/Calcium Gluconate 15 meq/ Multivitamins 10 ml/Chromium/ Copper/Manganese/ 

Seleni/Zn 0.5 ml/ Insulin Human Regular 40 unit/ Potassium Chloride 20 meq/ 

Total Parenteral Nutrition/Amino Acids/Dextrose/ Fat Emulsion Intravenous 1,400 

ml @  58.333 mls/ hr TPN  CONT IV  Last administered on 4/15/20at 21:20;  Start 

4/15/20 at 22:00;  Stop 4/16/20 at 21:59;  Status DC


Lidocaine HCl (Buffered Lidocaine 1%) 3 ml STK-MED ONCE .ROUTE ;  Start 4/15/20 

at 13:16;  Stop 4/15/20 at 13:16;  Status DC


Lidocaine HCl (Buffered Lidocaine 1%) 6 ml 1X  ONCE INJ  Last administered on 

4/15/20at 13:45;  Start 4/15/20 at 13:30;  Stop 4/15/20 at 13:31;  Status DC


Albumin Human 100 ml @  100 mls/hr 1X  ONCE IV  Last administered on 4/15/20at 

15:41;  Start 4/15/20 at 15:00;  Stop 4/15/20 at 15:59;  Status DC


Albumin Human 50 ml @ 50 mls/hr 1X  ONCE IV  Last administered on 4/15/20at 

15:00;  Start 4/15/20 at 15:00;  Stop 4/15/20 at 15:59;  Status DC


Info (PHARMACY MONITORING -- do not chart) 1 each PRN DAILY  PRN MC SEE 

COMMENTS;  Start 4/16/20 at 11:30;  Status Cancel


Info (PHARMACY MONITORING -- do not chart) 1 each PRN DAILY  PRN MC SEE 

COMMENTS;  Start 4/16/20 at 11:30;  Status UNV


Sodium Chloride 100 meq/Potassium Phosphate 10 mmol/ Magnesium Sulfate 12 

meq/Calcium Gluconate 15 meq/ Multivitamins 10 ml/Chromium/ Copper/Manganese/ 

Seleni/Zn 0.5 ml/ Insulin Human Regular 35 unit/ Potassium Chloride 20 meq/ 

Total Parenteral Nutrition/Amino Acids/Dextrose/ Fat Emulsion Intravenous 1,400 

ml @  58.333 mls/ hr TPN  CONT IV  Last administered on 4/16/20at 22:10;  Start 

4/16/20 at 22:00;  Stop 4/17/20 at 21:59;  Status DC


Sodium Chloride 100 meq/Potassium Phosphate 5 mmol/ Magnesium Sulfate 12 

meq/Calcium Gluconate 15 meq/ Multivitamins 10 ml/Chromium/ Copper/Manganese/ 

Seleni/Zn 0.5 ml/ Insulin Human Regular 35 unit/ Potassium Chloride 20 meq/ 

Total Parenteral Nutrition/Amino Acids/Dextrose/ Fat Emulsion Intravenous 1,400 

ml @  58.333 mls/ hr TPN  CONT IV  Last administered on 4/17/20at 22:59;  Start 

4/17/20 at 22:00;  Stop 4/18/20 at 21:59;  Status DC


Sodium Chloride 1,000 ml @  1,000 mls/hr Q1H PRN IV hypotension;  Start 4/18/20 

at 08:27;  Stop 4/18/20 at 14:26;  Status DC


Albumin Human 200 ml @  200 mls/hr 1X PRN  PRN IV Hypotension Last administered 

on 4/18/20at 09:18;  Start 4/18/20 at 08:30;  Stop 4/18/20 at 14:29;  Status DC


Sodium Chloride 1,000 ml @  400 mls/hr Q2H30M PRN IV PATENCY;  Start 4/18/20 at 

08:27;  Stop 4/18/20 at 20:26;  Status DC


Info (PHARMACY MONITORING -- do not chart) 1 each PRN DAILY  PRN MC SEE 

COMMENTS;  Start 4/18/20 at 08:30;  Status Cancel


Info (PHARMACY MONITORING -- do not chart) 1 each PRN DAILY  PRN MC SEE 

COMMENTS;  Start 4/18/20 at 08:30;  Stop 4/26/20 at 13:10;  Status DC


Sodium Chloride 100 meq/Potassium Chloride 40 meq/ Magnesium Sulfate 15 

meq/Calcium Gluconate 15 meq/ Multivitamins 10 ml/Chromium/ Copper/Manganese/ 

Seleni/Zn 0.5 ml/ Insulin Human Regular 35 unit/ Total Parenteral Nutrition

/Amino Acids/Dextrose/ Fat Emulsion Intravenous 1,400 ml @  58.333 mls/ hr TPN  

CONT IV  Last administered on 4/18/20at 22:00;  Start 4/18/20 at 22:00;  Stop 

4/19/20 at 21:59;  Status DC


Potassium Chloride/Water 100 ml @  100 mls/hr 1X  ONCE IV  Last administered on 

4/18/20at 17:28;  Start 4/18/20 at 14:45;  Stop 4/18/20 at 15:44;  Status DC


Sodium Chloride 100 meq/Potassium Chloride 40 meq/ Magnesium Sulfate 15 

meq/Calcium Gluconate 15 meq/ Multivitamins 10 ml/Chromium/ Copper/Manganese/ 

Seleni/Zn 0.5 ml/ Insulin Human Regular 35 unit/ Total Parenteral 

Nutrition/Amino Acids/Dextrose/ Fat Emulsion Intravenous 1,400 ml @  58.333 mls/

hr TPN  CONT IV  Last administered on 4/19/20at 22:46;  Start 4/19/20 at 22:00; 

Stop 4/20/20 at 21:59;  Status DC


Sodium Chloride 100 meq/Potassium Chloride 40 meq/ Magnesium Sulfate 20 

meq/Calcium Gluconate 15 meq/ Multivitamins 10 ml/Chromium/ Copper/Manganese/ 

Seleni/Zn 0.5 ml/ Insulin Human Regular 35 unit/ Total Parenteral 

Nutrition/Amino Acids/Dextrose/ Fat Emulsion Intravenous 1,400 ml @  58.333 mls/

hr TPN  CONT IV  Last administered on 4/20/20at 22:31;  Start 4/20/20 at 22:00; 

Stop 4/21/20 at 21:59;  Status DC


Fentanyl Citrate (Fentanyl 2ml Vial) 50 mcg PRN Q2HR  PRN IVP PAIN Last 

administered on 4/27/20at 13:32;  Start 4/20/20 at 21:00;  Stop 4/28/20 at 

12:53;  Status DC


Fentanyl Citrate (Fentanyl 2ml Vial) 25 mcg PRN Q2HR  PRN IVP PAIN;  Start 

4/20/20 at 21:00;  Stop 4/28/20 at 12:54;  Status DC


Enoxaparin Sodium (Lovenox 100mg Syringe) 100 mg Q12HR SQ ;  Start 4/21/20 at 

21:00;  Status UNV


Amino Acids/ Glycerin/ Electrolytes 1,000 ml @  75 mls/hr [id number] IV ;  Start 

4/20/20 at 21:15;  Status UNV


Sodium Chloride 1,000 ml @  1,000 mls/hr Q1H PRN IV hypotension;  Start 4/21/20 

at 07:56;  Stop 4/21/20 at 13:55;  Status DC


Albumin Human 200 ml @  200 mls/hr 1X PRN  PRN IV Hypotension Last administered 

on 4/21/20at 08:40;  Start 4/21/20 at 08:00;  Stop 4/21/20 at 13:59;  Status DC


Sodium Chloride 1,000 ml @  400 mls/hr Q2H30M PRN IV PATENCY;  Start 4/21/20 at 

07:56;  Stop 4/21/20 at 19:55;  Status DC


Info (PHARMACY MONITORING -- do not chart) 1 each PRN DAILY  PRN MC SEE 

COMMENTS;  Start 4/21/20 at 08:00;  Status UNV


Info (PHARMACY MONITORING -- do not chart) 1 each PRN DAILY  PRN MC SEE 

COMMENTS;  Start 4/21/20 at 08:00;  Status UNV


Daptomycin 430 mg/ Sodium Chloride 50 ml @  100 mls/hr Q24H IV  Last 

administered on 4/21/20at 12:35;  Start 4/21/20 at 09:00;  Stop 4/21/20 at 

12:49;  Status DC


Sodium Chloride 100 meq/Potassium Chloride 40 meq/ Magnesium Sulfate 20 

meq/Calcium Gluconate 15 meq/ Multivitamins 10 ml/Chromium/ Copper/Manganese/ 

Seleni/Zn 0.5 ml/ Insulin Human Regular 35 unit/ Total Parenteral 

Nutrition/Amino Acids/Dextrose/ Fat Emulsion Intravenous 1,400 ml @  58.333 mls/

hr TPN  CONT IV  Last administered on 4/21/20at 21:26;  Start 4/21/20 at 22:00; 

Stop 4/22/20 at 21:59;  Status DC


Daptomycin 430 mg/ Sodium Chloride 50 ml @  100 mls/hr Q48H IV ;  Start 4/23/20 

at 09:00;  Stop 4/22/20 at 11:55;  Status DC


Sodium Chloride 100 meq/Potassium Chloride 40 meq/ Magnesium Sulfate 20 

meq/Calcium Gluconate 15 meq/ Multivitamins 10 ml/Chromium/ Copper/Manganese/ 

Seleni/Zn 0.5 ml/ Insulin Human Regular 35 unit/ Total Parenteral 

Nutrition/Amino Acids/Dextrose/ Fat Emulsion Intravenous 1,400 ml @  58.333 mls/

hr TPN  CONT IV  Last administered on 4/22/20at 22:27;  Start 4/22/20 at 22:00; 

Stop 4/23/20 at 21:59;  Status DC


Daptomycin 430 mg/ Sodium Chloride 50 ml @  100 mls/hr Q24H IV  Last 

administered on 4/24/20at 15:07;  Start 4/22/20 at 13:00;  Stop 4/25/20 at 

13:15;  Status DC


Sodium Chloride 100 meq/Potassium Chloride 40 meq/ Magnesium Sulfate 20 

meq/Calcium Gluconate 10 meq/ Multivitamins 10 ml/Chromium/ Copper/Manganese/ 

Seleni/Zn 0.5 ml/ Insulin Human Regular 35 unit/ Total Parenteral 

Nutrition/Amino Acids/Dextrose/ Fat Emulsion Intravenous 1,400 ml @  58.333 mls/

hr TPN  CONT IV  Last administered on 4/24/20at 00:06;  Start 4/23/20 at 22:00; 

Stop 4/24/20 at 21:59;  Status DC


Alteplase, Recombinant (Cathflo For Central Catheter Clearance) 1 mg 1X  ONCE 

INT CAT  Last administered on 4/24/20at 11:44;  Start 4/24/20 at 10:45;  Stop 

4/24/20 at 10:46;  Status DC


Ondansetron HCl (Zofran) 4 mg PRN Q6HRS  PRN IV NAUSEA/VOMITING;  Start 4/27/20 

at 07:00;  Stop 4/28/20 at 06:59;  Status DC


Fentanyl Citrate (Fentanyl 2ml Vial) 25 mcg PRN Q5MIN  PRN IV MILD PAIN 1-3;  

Start 4/27/20 at 07:00;  Stop 4/28/20 at 06:59;  Status DC


Fentanyl Citrate (Fentanyl 2ml Vial) 50 mcg PRN Q5MIN  PRN IV MODERATE TO SEVERE

PAIN Last administered on 4/27/20at 10:17;  Start 4/27/20 at 07:00;  Stop 

4/28/20 at 06:59;  Status DC


Ringer's Solution 1,000 ml @  30 mls/hr Q24H IV ;  Start 4/27/20 at 07:00;  Stop

4/27/20 at 18:59;  Status DC


Lidocaine HCl (Xylocaine-Mpf 1% 2ml Vial) 2 ml PRN 1X  PRN ID PRIOR TO IV START;

 Start 4/27/20 at 07:00;  Stop 4/28/20 at 06:59;  Status DC


Prochlorperazine Edisylate (Compazine) 5 mg PACU PRN  PRN IV NAUSEA, MRX1;  

Start 4/27/20 at 07:00;  Stop 4/28/20 at 06:59;  Status DC


Sodium Acetate 50 meq/Potassium Acetate 55 meq/ Magnesium Sulfate 20 meq/Calcium

Gluconate 10 meq/ Multivitamins 10 ml/Chromium/ Copper/Manganese/ Seleni/Zn 0.5 

ml/ Insulin Human Regular 35 unit/ Total Parenteral Nutrition/Amino Acids/

Dextrose/ Fat Emulsion Intravenous 1,400 ml @  58.333 mls/ hr TPN  CONT IV ;  

Start 4/24/20 at 22:00;  Stop 4/24/20 at 14:15;  Status DC


Sodium Acetate 50 meq/Potassium Acetate 55 meq/ Magnesium Sulfate 20 meq/Calcium

Gluconate 10 meq/ Multivitamins 10 ml/Chromium/ Copper/Manganese/ Seleni/Zn 0.5 

ml/ Insulin Human Regular 35 unit/ Total Parenteral Nutrition/Amino 

Acids/Dextrose/ Fat Emulsion Intravenous 1,800 ml @  75 mls/hr TPN  CONT IV  

Last administered on 4/24/20at 22:38;  Start 4/24/20 at 22:00;  Stop 4/25/20 at 

21:59;  Status DC


Sodium Chloride 1,000 ml @  1,000 mls/hr Q1H PRN IV hypotension;  Start 4/24/20 

at 15:31;  Stop 4/24/20 at 21:30;  Status DC


Diphenhydramine HCl (Benadryl) 25 mg 1X PRN  PRN IV ITCHING;  Start 4/24/20 at 

15:45;  Stop 4/25/20 at 15:44;  Status DC


Diphenhydramine HCl (Benadryl) 25 mg 1X PRN  PRN IV ITCHING;  Start 4/24/20 at 

15:45;  Stop 4/25/20 at 15:44;  Status DC


Sodium Chloride 1,000 ml @  400 mls/hr Q2H30M PRN IV PATENCY;  Start 4/24/20 at 

15:31;  Stop 4/25/20 at 03:30;  Status DC


Info (PHARMACY MONITORING -- do not chart) 1 each PRN DAILY  PRN MC SEE 

COMMENTS;  Start 4/24/20 at 15:45;  Stop 5/26/20 at 14:14;  Status DC


Sodium Acetate 50 meq/Potassium Acetate 55 meq/ Magnesium Sulfate 20 meq/Calcium

Gluconate 10 meq/ Multivitamins 10 ml/Chromium/ Copper/Manganese/ Seleni/Zn 0.5 

ml/ Insulin Human Regular 35 unit/ Total Parenteral Nutrition/Amino Acids/D

extrose/ Fat Emulsion Intravenous 1,800 ml @  75 mls/hr TPN  CONT IV  Last 

administered on 4/25/20at 22:03;  Start 4/25/20 at 22:00;  Stop 4/26/20 at 

21:59;  Status DC


Daptomycin 430 mg/ Sodium Chloride 50 ml @  100 mls/hr Q24H IV  Last 

administered on 4/30/20at 13:00;  Start 4/25/20 at 13:00;  Stop 4/30/20 at 

20:58;  Status DC


Heparin Sodium (Porcine) 1000 unit/Sodium Chloride 1,001 ml @  1,001 mls/hr 1X  

ONCE IRR ;  Start 4/27/20 at 06:00;  Stop 4/27/20 at 06:59;  Status DC


Potassium Acetate 55 meq/Magnesium Sulfate 20 meq/ Calcium Gluconate 10 meq/ 

Multivitamins 10 ml/Chromium/ Copper/Manganese/ Seleni/Zn 0.5 ml/ Insulin Human 

Regular 35 unit/ Total Parenteral Nutrition/Amino Acids/Dextrose/ Fat Emulsion 

Intravenous 1,920 ml @  80 mls/hr TPN  CONT IV  Last administered on 4/26/20at 

22:10;  Start 4/26/20 at 22:00;  Stop 4/27/20 at 21:59;  Status DC


Dexamethasone Sodium Phosphate (Decadron) 4 mg STK-MED ONCE .ROUTE ;  Start 

4/27/20 at 10:56;  Stop 4/27/20 at 10:57;  Status DC


Ondansetron HCl (Zofran) 4 mg STK-MED ONCE .ROUTE ;  Start 4/27/20 at 10:56;  

Stop 4/27/20 at 10:57;  Status DC


Rocuronium Bromide (Zemuron) 50 mg STK-MED ONCE .ROUTE ;  Start 4/27/20 at 

10:56;  Stop 4/27/20 at 10:57;  Status DC


Fentanyl Citrate (Fentanyl 2ml Vial) 100 mcg STK-MED ONCE .ROUTE ;  Start 

4/27/20 at 10:56;  Stop 4/27/20 at 10:57;  Status DC


Bupivacaine HCl/ Epinephrine Bitart (Sensorcain-Epi 0.5%-1:[id number] Mpf) 30 ml 

STK-MED ONCE .ROUTE  Last administered on 4/27/20at 12:01;  Start 4/27/20 at 

10:58;  Stop 4/27/20 at 10:58;  Status DC


Cellulose (Surgicel Hemostat 2x14) 1 each STK-MED ONCE .ROUTE ;  Start 4/27/20 

at 10:58;  Stop 4/27/20 at 10:59;  Status DC


Iohexol (Omnipaque 300 Mg/ml) 50 ml STK-MED ONCE .ROUTE ;  Start 4/27/20 at 

10:58;  Stop 4/27/20 at 10:59;  Status DC


Cellulose (Surgicel Hemostat 4x8) 1 each STK-MED ONCE .ROUTE ;  Start 4/27/20 at

10:58;  Stop 4/27/20 at 10:59;  Status DC


Bisacodyl (Dulcolax Supp) 10 mg STK-MED ONCE .ROUTE ;  Start 4/27/20 at 10:59;  

Stop 4/27/20 at 10:59;  Status DC


Heparin Sodium (Porcine) 1000 unit/Sodium Chloride 1,001 ml @  1,001 mls/hr 1X  

ONCE IRR ;  Start 4/27/20 at 12:00;  Stop 4/27/20 at 12:59;  Status DC


Propofol 20 ml @ As Directed STK-MED ONCE IV ;  Start 4/27/20 at 11:05;  Stop 

4/27/20 at 11:05;  Status DC


Sevoflurane (Ultane) 90 ml STK-MED ONCE IH ;  Start 4/27/20 at 11:05;  Stop 

4/27/20 at 11:05;  Status DC


Sevoflurane (Ultane) 60 ml STK-MED ONCE IH ;  Start 4/27/20 at 12:26;  Stop 

4/27/20 at 12:27;  Status DC


Propofol 20 ml @ As Directed STK-MED ONCE IV ;  Start 4/27/20 at 12:26;  Stop 

4/27/20 at 12:27;  Status DC


Phenylephrine HCl (PHENYLEPHRINE in 0.9% NACL PF) 1 mg STK-MED ONCE IV ;  Start 

4/27/20 at 12:34;  Stop 4/27/20 at 12:34;  Status DC


Heparin Sodium (Porcine) (Heparin Sodium) 5,000 unit Q12HR SQ  Last administered

on 5/6/20at 20:57;  Start 4/27/20 at 21:00;  Stop 5/7/20 at 09:59;  Status DC


Sodium Chloride (Normal Saline Flush) 3 ml QSHIFT  PRN IV AFTER MEDS AND BLOOD 

DRAWS;  Start 4/27/20 at 13:45;  Status Cancel


Naloxone HCl (Narcan) 0.4 mg PRN Q2MIN  PRN IV SEE INSTRUCTIONS Last 

administered on 6/6/20at 15:15;  Start 4/27/20 at 13:45;  Stop 7/1/20 at 16:00; 

Status DC


Sodium Chloride 1,000 ml @  25 mls/hr Q24H IV  Last administered on 5/26/20at 

13:37;  Start 4/27/20 at 13:37;  Stop 5/29/20 at 13:09;  Status DC


Naloxone HCl (Narcan) 0.4 mg PRN Q2MIN  PRN IV SEE INSTRUCTIONS;  Start 4/27/20 

at 14:30;  Status UNV


Sodium Chloride 1,000 ml @  25 mls/hr Q24H IV ;  Start 4/27/20 at 14:30;  Status

UNV


Hydromorphone HCl 30 ml @ 0 mls/hr CONT PRN  PRN IV PER PROTOCOL Last admini

stered on 5/2/20at 16:08;  Start 4/27/20 at 14:30;  Stop 5/4/20 at 08:55;  

Status DC


Potassium Acetate 55 meq/Magnesium Sulfate 20 meq/ Calcium Gluconate 10 meq/ 

Multivitamins 10 ml/Chromium/ Copper/Manganese/ Seleni/Zn 0.5 ml/ Insulin Human 

Regular 35 unit/ Total Parenteral Nutrition/Amino Acids/Dextrose/ Fat Emulsion 

Intravenous 1,920 ml @  80 mls/hr TPN  CONT IV  Last administered on 4/27/20at 

22:01;  Start 4/27/20 at 22:00;  Stop 4/28/20 at 21:59;  Status DC


Bumetanide (Bumex) 2 mg [id number] IV  Last administered on 5/1/20at 13:50;  Start 

4/28/20 at 14:00;  Stop 5/2/20 at 14:10;  Status DC


Meropenem 1 gm/ Sodium Chloride 100 ml @  200 mls/hr Q8HRS IV  Last administered

on 5/22/20at 05:53;  Start 4/28/20 at 14:00;  Stop 5/22/20 at 09:31;  Status DC


Potassium Acetate 55 meq/Magnesium Sulfate 20 meq/ Calcium Gluconate 10 meq/ 

Multivitamins 10 ml/Chromium/ Copper/Manganese/ Seleni/Zn 0.5 ml/ Insulin Human 

Regular 35 unit/ Total Parenteral Nutrition/Amino Acids/Dextrose/ Fat Emulsion 

Intravenous 1,920 ml @  80 mls/hr TPN  CONT IV  Last administered on 4/28/20at 

22:02;  Start 4/28/20 at 22:00;  Stop 4/29/20 at 21:59;  Status DC


Hydromorphone HCl (Dilaudid Standard PCA) 12 mg STK-MED ONCE IV ;  Start 4/27/20

at 14:35;  Stop 4/28/20 at 13:53;  Status DC


Artificial Tears (Artificial Tears) 1 drop PRN Q15MIN  PRN OU DRY EYE Last 

administered on 6/23/20at 21:17;  Start 4/29/20 at 05:30


Hydromorphone HCl (Dilaudid Standard PCA) 12 mg STK-MED ONCE IV ;  Start 4/28/20

at 12:05;  Stop 4/29/20 at 09:15;  Status DC


Potassium Acetate 65 meq/Magnesium Sulfate 20 meq/ Calcium Gluconate 10 meq/ 

Multivitamins 10 ml/Chromium/ Copper/Manganese/ Seleni/Zn 0.5 ml/ Insulin Human 

Regular 30 unit/ Total Parenteral Nutrition/Amino Acids/Dextrose/ Fat Emulsion 

Intravenous 1,920 ml @  80 mls/hr TPN  CONT IV  Last administered on 4/29/20at 

22:22;  Start 4/29/20 at 22:00;  Stop 4/30/20 at 21:59;  Status DC


Cyclobenzaprine HCl (Flexeril) 10 mg PRN Q6HRS  PRN PO MUSCLE SPASMS Last 

administered on 7/10/20at 19:12;  Start 4/30/20 at 10:45


Potassium Acetate 55 meq/Magnesium Sulfate 20 meq/ Calcium Gluconate 10 meq/ 

Multivitamins 10 ml/Chromium/ Copper/Manganese/ Seleni/Zn 0.5 ml/ Insulin Human 

Regular 30 unit/ Total Parenteral Nutrition/Amino Acids/Dextrose/ Fat Emulsion 

Intravenous 1,920 ml @  80 mls/hr TPN  CONT IV  Last administered on 5/1/20at 

01:00;  Start 4/30/20 at 22:00;  Stop 5/1/20 at 21:59;  Status DC


Magnesium Sulfate 50 ml @ 25 mls/hr 1X  ONCE IV  Last administered on 4/30/20at 

17:18;  Start 4/30/20 at 12:45;  Stop 4/30/20 at 14:44;  Status DC


Potassium Chloride/Water 100 ml @  100 mls/hr 1X  ONCE IV  Last administered on 

5/1/20at 11:27;  Start 5/1/20 at 12:00;  Stop 5/1/20 at 12:59;  Status DC


Hydromorphone HCl (Dilaudid Standard PCA) 12 mg STK-MED ONCE IV ;  Start 4/29/20

at 10:50;  Stop 5/1/20 at 11:02;  Status DC


Hydromorphone HCl (Dilaudid Standard PCA) 12 mg STK-MED ONCE IV ;  Start 4/30/20

at 13:47;  Stop 5/1/20 at 11:03;  Status DC


Potassium Acetate 30 meq/Magnesium Sulfate 20 meq/ Calcium Gluconate 10 meq/ 

Multivitamins 10 ml/Chromium/ Copper/Manganese/ Seleni/Zn 0.5 ml/ Insulin Human 

Regular 30 unit/ Potassium Chloride 30 meq/ Total Parenteral Nutrition/Amino 

Acids/Dextrose/ Fat Emulsion Intravenous 1,920 ml @  80 mls/hr TPN  CONT IV  

Last administered on 5/1/20at 22:34;  Start 5/1/20 at 22:00;  Stop 5/2/20 at 

21:59;  Status DC


Potassium Chloride/Water 100 ml @  100 mls/hr Q1H IV  Last administered on 

5/2/20at 13:05;  Start 5/2/20 at 07:00;  Stop 5/2/20 at 10:59;  Status DC


Magnesium Sulfate 50 ml @ 25 mls/hr 1X  ONCE IV  Last administered on 5/2/20at 

10:34;  Start 5/2/20 at 10:30;  Stop 5/2/20 at 12:29;  Status DC


Potassium Chloride 75 meq/ Magnesium Sulfate 20 meq/Calcium Gluconate 10 meq/ 

Multivitamins 10 ml/Chromium/ Copper/Manganese/ Seleni/Zn 0.5 ml/ Insulin Human 

Regular 30 unit/ Total Parenteral Nutrition/Amino Acids/Dextrose/ Fat Emulsion 

Intravenous 1,920 ml @  80 mls/hr TPN  CONT IV  Last administered on 5/2/20at 

21:51;  Start 5/2/20 at 22:00;  Stop 5/3/20 at 22:00;  Status DC


Potassium Chloride 75 meq/ Magnesium Sulfate 20 meq/Calcium Gluconate 10 meq/ 

Multivitamins 10 ml/Chromium/ Copper/Manganese/ Seleni/Zn 0.5 ml/ Insulin Human 

Regular 25 unit/ Total Parenteral Nutrition/Amino Acids/Dextrose/ Fat Emulsion 

Intravenous 1,920 ml @  80 mls/hr TPN  CONT IV  Last administered on 5/3/20at 

22:04;  Start 5/3/20 at 22:00;  Stop 5/4/20 at 21:59;  Status DC


Hydromorphone HCl (Dilaudid) 0.4 mg PRN Q4HRS  PRN IVP PAIN Last administered on

5/4/20at 10:57;  Start 5/4/20 at 09:00;  Stop 5/4/20 at 18:59;  Status DC


Micafungin Sodium 100 mg/Dextrose 100 ml @  100 mls/hr Q24H IV  Last 

administered on 5/26/20at 12:17;  Start 5/4/20 at 11:00;  Stop 5/27/20 at 09:59;

 Status DC


Daptomycin 485 mg/ Sodium Chloride 50 ml @  100 mls/hr Q24H IV  Last 

administered on 5/11/20at 13:10;  Start 5/4/20 at 11:00;  Stop 5/12/20 at 07:44;

 Status DC


Potassium Chloride 75 meq/ Magnesium Sulfate 15 meq/Calcium Gluconate 8 meq/ 

Multivitamins 10 ml/Chromium/ Copper/Manganese/ Seleni/Zn 0.5 ml/ Insulin Human 

Regular 25 unit/ Total Parenteral Nutrition/Amino Acids/Dextrose/ Fat Emulsion 

Intravenous 1,920 ml @  80 mls/hr TPN  CONT IV  Last administered on 5/4/20at 

23:08;  Start 5/4/20 at 22:00;  Stop 5/5/20 at 21:59;  Status DC


Haloperidol Lactate (Haldol Inj) 3 mg 1X  ONCE IVP  Last administered on 

5/4/20at 14:37;  Start 5/4/20 at 14:30;  Stop 5/4/20 at 14:31;  Status DC


Hydromorphone HCl (Dilaudid) 1 mg PRN Q4HRS  PRN IVP PAIN Last administered on 

5/18/20at 06:25;  Start 5/4/20 at 19:00;  Stop 5/18/20 at 17:10;  Status DC


Potassium Chloride 75 meq/ Magnesium Sulfate 15 meq/Calcium Gluconate 8 meq/ 

Multivitamins 10 ml/Chromium/ Copper/Manganese/ Seleni/Zn 0.5 ml/ Insulin Human 

Regular 20 unit/ Total Parenteral Nutrition/Amino Acids/Dextrose/ Fat Emulsion 

Intravenous 1,920 ml @  80 mls/hr TPN  CONT IV  Last administered on 5/5/20at 

22:10;  Start 5/5/20 at 22:00;  Stop 5/6/20 at 21:59;  Status DC


Lidocaine HCl (Buffered Lidocaine 1%) 3 ml STK-MED ONCE .ROUTE ;  Start 5/6/20 

at 11:31;  Stop 5/6/20 at 11:31;  Status DC


Lidocaine HCl (Buffered Lidocaine 1%) 3 ml STK-MED ONCE .ROUTE ;  Start 5/6/20 

at 12:28;  Stop 5/6/20 at 12:29;  Status DC


Lidocaine HCl (Buffered Lidocaine 1%) 6 ml 1X  ONCE INJ  Last administered on 

5/6/20at 12:53;  Start 5/6/20 at 12:45;  Stop 5/6/20 at 12:46;  Status DC


Potassium Chloride 75 meq/ Magnesium Sulfate 15 meq/Calcium Gluconate 8 meq/ 

Multivitamins 10 ml/Chromium/ Copper/Manganese/ Seleni/Zn 0.5 ml/ Insulin Human 

Regular 20 unit/ Total Parenteral Nutrition/Amino Acids/Dextrose/ Fat Emulsion 

Intravenous 1,920 ml @  80 mls/hr TPN  CONT IV  Last administered on 5/6/20at 

22:00;  Start 5/6/20 at 22:00;  Stop 5/7/20 at 21:59;  Status DC


Potassium Chloride 75 meq/ Magnesium Sulfate 15 meq/Calcium Gluconate 8 meq/ 

Multivitamins 10 ml/Chromium/ Copper/Manganese/ Seleni/Zn 0.5 ml/ Insulin Human 

Regular 15 unit/ Total Parenteral Nutrition/Amino Acids/Dextrose/ Fat Emulsion 

Intravenous 1,920 ml @  80 mls/hr TPN  CONT IV  Last administered on 5/7/20at 

22:28;  Start 5/7/20 at 22:00;  Stop 5/8/20 at 21:59;  Status DC


Vecuronium Bromide (Norcuron Bolus) 6 mg PRN Q6HRS  PRN IV VENT ASYNCHRONY;  

Start 5/7/20 at 19:15;  Stop 5/7/20 at 19:35;  Status DC


Bumetanide (Bumex) 2 mg 1X  ONCE IV  Last administered on 5/7/20at 22:09;  Start

5/7/20 at 19:45;  Stop 5/7/20 at 19:46;  Status DC


Lidocaine HCl (Buffered Lidocaine 1%) 3 ml STK-MED ONCE .ROUTE ;  Start 5/8/20 

at 07:59;  Stop 5/8/20 at 07:59;  Status DC


Midazolam HCl (Versed) 5 mg STK-MED ONCE .ROUTE ;  Start 5/8/20 at 08:36;  Stop 

5/8/20 at 08:36;  Status DC


Fentanyl Citrate (Fentanyl 5ml Vial) 250 mcg STK-MED ONCE .ROUTE ;  Start 5/8/20

at 08:36;  Stop 5/8/20 at 08:37;  Status DC


Lidocaine HCl (Buffered Lidocaine 1%) 3 ml 1X  ONCE IJ  Last administered on 

5/8/20at 09:30;  Start 5/8/20 at 09:15;  Stop 5/8/20 at 09:16;  Status DC


Midazolam HCl (Versed) 5 mg 1X  ONCE IV  Last administered on 5/8/20at 09:30;  

Start 5/8/20 at 09:15;  Stop 5/8/20 at 09:16;  Status DC


Fentanyl Citrate (Fentanyl 5ml Vial) 250 mcg 1X  ONCE IV  Last administered on 

5/8/20at 09:30;  Start 5/8/20 at 09:15;  Stop 5/8/20 at 09:16;  Status DC


Bumetanide (Bumex) 2 mg DAILY IV  Last administered on 5/18/20at 08:07;  Start 

5/8/20 at 10:00;  Stop 5/18/20 at 17:15;  Status DC


Potassium Chloride 75 meq/ Magnesium Sulfate 15 meq/ Multivitamins 10 

ml/Chromium/ Copper/Manganese/ Seleni/Zn 0.5 ml/ Insulin Human Regular 15 unit/ 

Total Parenteral Nutrition/Amino Acids/Dextrose/ Fat Emulsion Intravenous 1,920 

ml @  80 mls/hr TPN  CONT IV  Last administered on 5/8/20at 21:59;  Start 5/8/20

at 22:00;  Stop 5/9/20 at 21:59;  Status DC


Metoclopramide HCl (Reglan Vial) 10 mg PRN Q3HRS  PRN IVP NAUSEA/VOMITING-3rd 

choice Last administered on 5/14/20at 04:25;  Start 5/9/20 at 16:45


Potassium Chloride 75 meq/ Magnesium Sulfate 15 meq/ Multivitamins 10 

ml/Chromium/ Copper/Manganese/ Seleni/Zn 0.5 ml/ Insulin Human Regular 15 unit/ 

Total Parenteral Nutrition/Amino Acids/Dextrose/ Fat Emulsion Intravenous 1,920 

ml @  80 mls/hr TPN  CONT IV  Last administered on 5/9/20at 22:41;  Start 5/9/20

at 22:00;  Stop 5/10/20 at 21:59;  Status DC


Magnesium Sulfate 50 ml @ 25 mls/hr 1X  ONCE IV  Last administered on 5/10/20at 

10:44;  Start 5/10/20 at 09:00;  Stop 5/10/20 at 10:59;  Status DC


Potassium Chloride/Water 100 ml @  100 mls/hr 1X  ONCE IV  Last administered on 

5/10/20at 09:37;  Start 5/10/20 at 09:00;  Stop 5/10/20 at 09:59;  Status DC


Duloxetine HCl (Cymbalta) 30 mg DAILY PO  Last administered on 5/11/20at 09:48; 

Start 5/10/20 at 14:00;  Stop 5/13/20 at 10:25;  Status DC


Potassium Chloride 80 meq/ Magnesium Sulfate 20 meq/ Multivitamins 10 

ml/Chromium/ Copper/Manganese/ Seleni/Zn 0.5 ml/ Insulin Human Regular 15 unit/ 

Total Parenteral Nutrition/Amino Acids/Dextrose/ Fat Emulsion Intravenous 1,920 

ml @  80 mls/hr TPN  CONT IV  Last administered on 5/10/20at 21:42;  Start 

5/10/20 at 22:00;  Stop 5/11/20 at 21:59;  Status DC


Potassium Chloride 80 meq/ Magnesium Sulfate 20 meq/ Multivitamins 10 

ml/Chromium/ Copper/Manganese/ Seleni/Zn 0.5 ml/ Insulin Human Regular 15 unit/ 

Total Parenteral Nutrition/Amino Acids/Dextrose/ Fat Emulsion Intravenous 1,920 

ml @  80 mls/hr TPN  CONT IV  Last administered on 5/11/20at 22:20;  Start 

5/11/20 at 22:00;  Stop 5/12/20 at 21:59;  Status DC


Lidocaine HCl (Buffered Lidocaine 1%) 3 ml STK-MED ONCE .ROUTE ;  Start 5/12/20 

at 09:54;  Stop 5/12/20 at 09:55;  Status DC


Hydromorphone HCl (Dilaudid Standard PCA) 12 mg STK-MED ONCE IV ;  Start 5/1/20 

at 15:50;  Stop 5/12/20 at 11:24;  Status DC


Potassium Chloride 80 meq/ Magnesium Sulfate 20 meq/ Multivitamins 10 

ml/Chromium/ Copper/Manganese/ Seleni/Zn 0.5 ml/ Insulin Human Regular 15 unit/ 

Total Parenteral Nutrition/Amino Acids/Dextrose/ Fat Emulsion Intravenous 1,920 

ml @  80 mls/hr TPN  CONT IV  Last administered on 5/12/20at 21:40;  Start 

5/12/20 at 22:00;  Stop 5/13/20 at 21:59;  Status DC


Lidocaine HCl (Buffered Lidocaine 1%) 6 ml 1X  ONCE INJ  Last administered on 

5/12/20at 14:15;  Start 5/12/20 at 14:15;  Stop 5/12/20 at 14:16;  Status DC


Potassium Chloride 80 meq/ Magnesium Sulfate 20 meq/ Multivitamins 10 

ml/Chromium/ Copper/Manganese/ Seleni/Zn 1 ml/ Insulin Human Regular 15 unit/ 

Total Parenteral Nutrition/Amino Acids/Dextrose/ Fat Emulsion Intravenous 1,920 

ml @  80 mls/hr TPN  CONT IV  Last administered on 5/13/20at 22:04;  Start 

5/13/20 at 22:00;  Stop 5/14/20 at 21:59;  Status DC


Potassium Chloride/Water 100 ml @  100 mls/hr 1X  ONCE IV  Last administered on 

5/14/20at 11:34;  Start 5/14/20 at 11:00;  Stop 5/14/20 at 11:59;  Status DC


Potassium Chloride 90 meq/ Magnesium Sulfate 20 meq/ Multivitamins 10 

ml/Chromium/ Copper/Manganese/ Seleni/Zn 1 ml/ Insulin Human Regular 15 unit/ 

Total Parenteral Nutrition/Amino Acids/Dextrose/ Fat Emulsion Intravenous 1,920 

ml @  80 mls/hr TPN  CONT IV  Last administered on 5/14/20at 22:57;  Start 

5/14/20 at 22:00;  Stop 5/15/20 at 21:59;  Status DC


Potassium Chloride 90 meq/ Magnesium Sulfate 20 meq/ Multivitamins 10 

ml/Chromium/ Copper/Manganese/ Seleni/Zn 1 ml/ Insulin Human Regular 15 unit/ 

Total Parenteral Nutrition/Amino Acids/Dextrose/ Fat Emulsion Intravenous 1,920 

ml @  80 mls/hr TPN  CONT IV  Last administered on 5/15/20at 22:48;  Start 

5/15/20 at 22:00;  Stop 5/16/20 at 21:59;  Status DC


Potassium Chloride 90 meq/ Magnesium Sulfate 20 meq/ Multivitamins 10 

ml/Chromium/ Copper/Manganese/ Seleni/Zn 1 ml/ Insulin Human Regular 15 unit/ 

Total Parenteral Nutrition/Amino Acids/Dextrose/ Fat Emulsion Intravenous 1,890 

ml @  78.75 mls/ hr TPN  CONT IV  Last administered on 5/16/20at 22:15;  Start 

5/16/20 at 22:00;  Stop 5/17/20 at 21:59;  Status DC


Linezolid/Dextrose 300 ml @  300 mls/hr Q12HR IV  Last administered on 5/19/20at

21:08;  Start 5/17/20 at 09:00;  Stop 5/20/20 at 08:11;  Status DC


Daptomycin 450 mg/ Sodium Chloride 50 ml @  100 mls/hr Q24H IV  Last 

administered on 5/20/20at 09:25;  Start 5/17/20 at 09:00;  Stop 5/21/20 at 

08:30;  Status DC


Potassium Chloride 90 meq/ Magnesium Sulfate 20 meq/ Multivitamins 10 

ml/Chromium/ Copper/Manganese/ Seleni/Zn 1 ml/ Insulin Human Regular 15 unit/ 

Total Parenteral Nutrition/Amino Acids/Dextrose/ Fat Emulsion Intravenous 1,890 

ml @  78.75 mls/ hr TPN  CONT IV  Last administered on 5/17/20at 21:34;  Start 

5/17/20 at 22:00;  Stop 5/18/20 at 21:59;  Status DC


Lorazepam (Ativan Inj) 2 mg STK-MED ONCE .ROUTE ;  Start 5/17/20 at 14:58;  Stop

5/17/20 at 14:58;  Status DC


Metoprolol Tartrate (Lopressor Vial) 5 mg 1X  ONCE IVP  Last administered on 

5/17/20at 15:31;  Start 5/17/20 at 15:15;  Stop 5/17/20 at 15:16;  Status DC


Lorazepam (Ativan Inj) 2 mg 1X  ONCE IVP  Last administered on 5/17/20at 15:30; 

Start 5/17/20 at 15:15;  Stop 5/17/20 at 15:16;  Status DC


Enoxaparin Sodium (Lovenox 40mg Syringe) 40 mg Q24H SQ  Last administered on 

6/5/20at 17:44;  Start 5/17/20 at 17:00;  Stop 6/7/20 at 06:50;  Status DC


Lorazepam (Ativan Inj) 1 mg PRN Q4HRS  PRN IVP ANXIETY / AGITATION MILD-MOD Last

administered on 5/31/20at 15:55;  Start 5/17/20 at 19:15;  Stop 6/2/20 at 11:45;

 Status DC


Lorazepam (Ativan Inj) 2 mg PRN Q4HRS  PRN IVP ANXIETY / AGITATION SEVERE Last 

administered on 6/1/20at 07:55;  Start 5/17/20 at 19:15;  Stop 6/2/20 at 11:45; 

Status DC


Fentanyl Citrate (Fentanyl 2ml Vial) 50 mcg PRN Q4HRS  PRN IVP SEVERE PAIN Last 

administered on 6/13/20at 05:15;  Start 5/18/20 at 13:15;  Stop 6/14/20 at 

09:29;  Status DC


Fentanyl Citrate (Fentanyl 2ml Vial) 25 mcg PRN Q4HRS  PRN IVP MODERATE PAIN 

Last administered on 6/13/20at 00:27;  Start 5/18/20 at 13:15;  Stop 6/14/20 at 

09:30;  Status DC


Potassium Chloride 90 meq/ Magnesium Sulfate 20 meq/ Multivitamins 10 

ml/Chromium/ Copper/Manganese/ Seleni/Zn 1 ml/ Insulin Human Regular 15 unit/ 

Total Parenteral Nutrition/Amino Acids/Dextrose/ Fat Emulsion Intravenous 1,890 

ml @  78.75 mls/ hr TPN  CONT IV  Last administered on 5/18/20at 22:18;  Start 

5/18/20 at 22:00;  Stop 5/19/20 at 21:59;  Status DC


Furosemide (Lasix) 40 mg 1X  ONCE IVP  Last administered on 5/18/20at 21:51;  

Start 5/18/20 at 21:45;  Stop 5/18/20 at 21:48;  Status DC


Albumin Human 100 ml @  100 mls/hr 1X PRN  PRN IV SEE COMMENTS;  Start 5/19/20 

at 01:30


Furosemide (Lasix) 40 mg [id number] IVP  Last administered on 6/3/20at 08:04;  Start 

5/19/20 at 14:00;  Stop 6/3/20 at 13:07;  Status DC


Potassium Chloride 90 meq/ Magnesium Sulfate 20 meq/ Multivitamins 10 

ml/Chromium/ Copper/Manganese/ Seleni/Zn 1 ml/ Insulin Human Regular 15 unit/ 

Total Parenteral Nutrition/Amino Acids/Dextrose/ Fat Emulsion Intravenous 1,800 

ml @  75 mls/hr TPN  CONT IV  Last administered on 5/19/20at 22:31;  Start 5 /19/20 at 22:00;  Stop 5/20/20 at 21:59;  Status DC


Potassium Chloride 90 meq/ Magnesium Sulfate 20 meq/ Multivitamins 10 ml/Chr

omium/ Copper/Manganese/ Seleni/Zn 1 ml/ Insulin Human Regular 15 unit/ Total 

Parenteral Nutrition/Amino Acids/Dextrose/ Fat Emulsion Intravenous 1,800 ml @  

75 mls/hr TPN  CONT IV  Last administered on 5/20/20at 22:28;  Start 5/20/20 at 

22:00;  Stop 5/21/20 at 21:59;  Status DC


Potassium Chloride 110 meq/ Magnesium Sulfate 20 meq/ Multivitamins 10 

ml/Chromium/ Copper/Manganese/ Seleni/Zn 1 ml/ Insulin Human Regular 15 unit/ 

Total Parenteral Nutrition/Amino Acids/Dextrose/ Fat Emulsion Intravenous 1,800 

ml @  75 mls/hr TPN  CONT IV  Last administered on 5/21/20at 22:01;  Start 

5/21/20 at 22:00;  Stop 5/22/20 at 21:59;  Status DC


Saliva Substitute (Biotene Moisturizing Mouth) 2 spray PRN Q15MIN  PRN PO DRY 

MOUTH;  Start 5/21/20 at 11:00


Potassium Chloride 110 meq/ Magnesium Sulfate 20 meq/ Multivitamins 10 

ml/Chromium/ Copper/Manganese/ Seleni/Zn 1 ml/ Insulin Human Regular 15 unit/ T

otal Parenteral Nutrition/Amino Acids/Dextrose/ Fat Emulsion Intravenous 1,800 

ml @  75 mls/hr TPN  CONT IV  Last administered on 5/22/20at 22:21;  Start 

5/22/20 at 22:00;  Stop 5/23/20 at 21:59;  Status DC


Potassium Chloride 110 meq/ Magnesium Sulfate 20 meq/ Multivitamins 10 

ml/Chromium/ Copper/Manganese/ Seleni/Zn 1 ml/ Insulin Human Regular 15 unit/ 

Total Parenteral Nutrition/Amino Acids/Dextrose/ Fat Emulsion Intravenous 1,800 

ml @  75 mls/hr TPN  CONT IV  Last administered on 5/23/20at 22:04;  Start 5 /23/20 at 22:00;  Stop 5/24/20 at 21:59;  Status DC


Potassium Chloride 110 meq/ Magnesium Sulfate 20 meq/ Multivitamins 10 ml/Ch

romium/ Copper/Manganese/ Seleni/Zn 1 ml/ Insulin Human Regular 15 unit/ Total 

Parenteral Nutrition/Amino Acids/Dextrose/ Fat Emulsion Intravenous 1,800 ml @  

75 mls/hr TPN  CONT IV  Last administered on 5/24/20at 22:48;  Start 5/24/20 at 

22:00;  Stop 5/25/20 at 21:59;  Status DC


Potassium Chloride 70 meq/ Magnesium Sulfate 20 meq/ Multivitamins 10 

ml/Chromium/ Copper/Manganese/ Seleni/Zn 1 ml/ Insulin Human Regular 15 unit/ 

Total Parenteral Nutrition/Amino Acids/Dextrose/ Fat Emulsion Intravenous 1,800 

ml @  75 mls/hr TPN  CONT IV  Last administered on 5/25/20at 21:39;  Start 

5/25/20 at 22:00;  Stop 5/26/20 at 21:59;  Status DC


Meropenem 500 mg/ Sodium Chloride 50 ml @  100 mls/hr Q6HRS IV  Last 

administered on 5/27/20at 06:02;  Start 5/25/20 at 18:00;  Stop 5/27/20 at 

09:59;  Status DC


Barium Sulfate (Varibar Thin Liquid Apple) 148 gm 1X  ONCE PO ;  Start 5/26/20 

at 11:45;  Stop 5/26/20 at 11:49;  Status DC


Potassium Chloride 70 meq/ Magnesium Sulfate 20 meq/ Multivitamins 10 

ml/Chromium/ Copper/Manganese/ Seleni/Zn 1 ml/ Insulin Human Regular 15 unit/ 

Total Parenteral Nutrition/Amino Acids/Dextrose/ Fat Emulsion Intravenous 1,800 

ml @  75 mls/hr TPN  CONT IV  Last administered on 5/26/20at 22:27;  Start 

5/26/20 at 22:00;  Stop 5/27/20 at 21:59;  Status DC


Piperacillin Sod/ Tazobactam Sod 3.375 gm/Sodium Chloride 50 ml @  100 mls/hr 

Q6HRS IV  Last administered on 6/4/20at 06:10;  Start 5/27/20 at 12:00;  Stop 

6/4/20 at 07:26;  Status DC


Potassium Chloride 70 meq/ Magnesium Sulfate 20 meq/ Multivitamins 10 ml/

Chromium/ Copper/Manganese/ Seleni/Zn 1 ml/ Insulin Human Regular 15 unit/ Total

Parenteral Nutrition/Amino Acids/Dextrose/ Fat Emulsion Intravenous 1,800 ml @  

75 mls/hr TPN  CONT IV  Last administered on 5/27/20at 22:03;  Start 5/27/20 at 

22:00;  Stop 5/28/20 at 21:59;  Status DC


Potassium Chloride 70 meq/ Magnesium Sulfate 20 meq/ Multivitamins 10 

ml/Chromium/ Copper/Manganese/ Seleni/Zn 1 ml/ Insulin Human Regular 15 unit/ 

Total Parenteral Nutrition/Amino Acids/Dextrose/ Fat Emulsion Intravenous 1,800 

ml @  75 mls/hr TPN  CONT IV  Last administered on 5/28/20at 22:33;  Start 

5/28/20 at 22:00;  Stop 5/29/20 at 21:59;  Status DC


Potassium Chloride 70 meq/ Magnesium Sulfate 20 meq/ Multivitamins 10 

ml/Chromium/ Copper/Manganese/ Seleni/Zn 1 ml/ Insulin Human Regular 15 unit/ 

Total Parenteral Nutrition/Amino Acids/Dextrose/ Fat Emulsion Intravenous 1,800 

ml @  75 mls/hr TPN  CONT IV  Last administered on 5/29/20at 23:13;  Start 

5/29/20 at 22:00;  Stop 5/30/20 at 21:59;  Status DC


Potassium Chloride 80 meq/ Magnesium Sulfate 20 meq/ Multivitamins 10 

ml/Chromium/ Copper/Manganese/ Seleni/Zn 1 ml/ Insulin Human Regular 15 unit/ 

Total Parenteral Nutrition/Amino Acids/Dextrose/ Fat Emulsion Intravenous 1,800 

ml @  75 mls/hr TPN  CONT IV  Last administered on 5/30/20at 22:30;  Start 

5/30/20 at 22:00;  Stop 5/31/20 at 21:59;  Status DC


Potassium Chloride 80 meq/ Magnesium Sulfate 20 meq/ Multivitamins 10 

ml/Chromium/ Copper/Manganese/ Seleni/Zn 1 ml/ Insulin Human Regular 15 unit/ 

Total Parenteral Nutrition/Amino Acids/Dextrose/ Fat Emulsion Intravenous 1,800 

ml @  75 mls/hr TPN  CONT IV  Last administered on 5/31/20at 21:54;  Start 

5/31/20 at 22:00;  Stop 6/1/20 at 21:59;  Status DC


Potassium Chloride/Water 100 ml @  100 mls/hr 1X  ONCE IV  Last administered on 

6/1/20at 10:15;  Start 6/1/20 at 10:00;  Stop 6/1/20 at 10:59;  Status DC


Potassium Chloride 90 meq/ Magnesium Sulfate 20 meq/ Multivitamins 10 

ml/Chromium/ Copper/Manganese/ Seleni/Zn 1 ml/ Insulin Human Regular 20 unit/ 

Total Parenteral Nutrition/Amino Acids/Dextrose/ Fat Emulsion Intravenous 1,800 

ml @  75 mls/hr TPN  CONT IV  Last administered on 6/1/20at 22:28;  Start 6/1/20

at 22:00;  Stop 6/2/20 at 21:59;  Status DC


Potassium Chloride 90 meq/ Magnesium Sulfate 20 meq/ Multivitamins 10 

ml/Chromium/ Copper/Manganese/ Seleni/Zn 1 ml/ Insulin Human Regular 20 unit/ 

Total Parenteral Nutrition/Amino Acids/Dextrose/ Fat Emulsion Intravenous 1,800 

ml @  75 mls/hr TPN  CONT IV  Last administered on 6/2/20at 22:08;  Start 6/2/20

at 22:00;  Stop 6/3/20 at 21:59;  Status DC


Lorazepam (Ativan Inj) 0.25 mg PRN Q4HRS  PRN IVP ANXIETY / AGITATION Last 

administered on 7/12/20at 00:27;  Start 6/3/20 at 07:30


Potassium Chloride 90 meq/ Magnesium Sulfate 20 meq/ Multivitamins 10 

ml/Chromium/ Copper/Manganese/ Seleni/Zn 1 ml/ Insulin Human Regular 20 unit/ 

Total Parenteral Nutrition/Amino Acids/Dextrose/ Fat Emulsion Intravenous 1,800 

ml @  75 mls/hr TPN  CONT IV  Last administered on 6/3/20at 23:13;  Start 6/3/20

at 22:00;  Stop 6/4/20 at 21:59;  Status DC


Furosemide (Lasix) 40 mg DAILY IVP  Last administered on 6/5/20at 11:14;  Start 

6/3/20 at 13:30;  Stop 6/7/20 at 09:12;  Status DC


Fluoxetine HCl (PROzac) 20 mg QHS PEG  Last administered on 7/15/20at 20:44;  

Start 6/4/20 at 21:00


Fentanyl (Duragesic 50mcg/ Hr Patch) 1 patch Q72H TD  Last administered on 

6/4/20at 21:22;  Start 6/4/20 at 21:00;  Stop 6/13/20 at 12:00;  Status DC


Potassium Chloride 40 meq/ Potassium Acetate 60 meq/Magnesium Sulfate 10 meq/ 

Multivitamins 10 ml/Chromium/ Copper/Manganese/ Seleni/Zn 1 ml/ Insulin Human 

Regular 20 unit/ Total Parenteral Nutrition/Amino Acids/Dextrose/ Fat Emulsion 

Intravenous 1,800 ml @  75 mls/hr TPN  CONT IV  Last administered on 6/5/20at 

00:03;  Start 6/4/20 at 22:00;  Stop 6/5/20 at 21:59;  Status DC


Potassium Acetate 80 meq/Magnesium Sulfate 5 meq/ Multivitamins 10 ml/Chromium/ 

Copper/Manganese/ Seleni/Zn 1 ml/ Insulin Human Regular 20 unit/ Total Parenter

al Nutrition/Amino Acids/Dextrose/ Fat Emulsion Intravenous 1,920 ml @  80 

mls/hr TPN  CONT IV  Last administered on 6/5/20at 21:59;  Start 6/5/20 at 

22:00;  Stop 6/6/20 at 21:59;  Status DC


Potassium Acetate 60 meq/Magnesium Sulfate 5 meq/ Multivitamins 10 ml/Chromium/ 

Copper/Manganese/ Seleni/Zn 1 ml/ Insulin Human Regular 30 unit/ Total 

Parenteral Nutrition/Amino Acids/Dextrose/ Fat Emulsion Intravenous 1,920 ml @  

80 mls/hr TPN  CONT IV  Last administered on 6/6/20at 21:54;  Start 6/6/20 at 

22:00;  Stop 6/7/20 at 21:59;  Status DC


Norepinephrine Bitartrate 8 mg/ Dextrose 258 ml @  13.332 mls/ hr CONT  PRN IV 

PER PROTOCOL Last administered on 7/2/20at 09:09;  Start 6/7/20 at 06:30


Albumin Human 500 ml @  125 mls/hr 1X  ONCE IV  Last administered on 6/7/20at 

08:10;  Start 6/7/20 at 08:15;  Stop 6/7/20 at 12:14;  Status DC


Potassium Acetate 40 meq/Magnesium Sulfate 5 meq/ Multivitamins 10 ml/Chromium/ 

Copper/Manganese/ Seleni/Zn 1 ml/ Insulin Human Regular 30 unit/ Total 

Parenteral Nutrition/Amino Acids/Dextrose/ Fat Emulsion Intravenous 1,920 ml @  

80 mls/hr TPN  CONT IV  Last administered on 6/7/20at 22:23;  Start 6/7/20 at 

22:00;  Stop 6/8/20 at 21:59;  Status DC


Meropenem 1 gm/ Sodium Chloride 100 ml @  200 mls/hr Q8HRS IV ;  Start 6/7/20 at

14:00;  Status Cancel


Meropenem 1 gm/ Sodium Chloride 100 ml @  200 mls/hr Q8HRS IV  Last administered

on 6/7/20at 11:04;  Start 6/7/20 at 10:00;  Stop 6/7/20 at 13:00;  Status DC


Meropenem 1 gm/ Sodium Chloride 100 ml @  200 mls/hr Q12HR IV  Last administered

on 6/25/20at 08:27;  Start 6/7/20 at 21:00;  Stop 6/25/20 at 08:56;  Status DC


Sodium Chloride 1,000 ml @  1,000 mls/hr 1X  ONCE IV  Last administered on 

6/7/20at 11:06;  Start 6/7/20 at 10:45;  Stop 6/7/20 at 11:44;  Status DC


Micafungin Sodium 100 mg/Dextrose 100 ml @  100 mls/hr Q24H IV  Last 

administered on 6/24/20at 12:34;  Start 6/7/20 at 11:00;  Stop 6/25/20 at 08:56;

 Status DC


Daptomycin 410 mg/ Sodium Chloride 50 ml @  100 mls/hr Q24H IV  Last 

administered on 6/9/20at 13:33;  Start 6/7/20 at 14:00;  Stop 6/10/20 at 08:30; 

Status DC


Midazolam HCl (Versed) 2 mg STK-MED ONCE .ROUTE ;  Start 6/7/20 at 14:47;  Stop 

6/7/20 at 14:48;  Status DC


Fentanyl Citrate (Fentanyl 2ml Vial) 100 mcg STK-MED ONCE .ROUTE ;  Start 6/7/20

at 14:47;  Stop 6/7/20 at 14:48;  Status DC


Flumazenil (Romazicon) 0.5 mg STK-MED ONCE IV ;  Start 6/7/20 at 14:48;  Stop 

6/7/20 at 14:48;  Status DC


Naloxone HCl (Narcan) 0.4 mg STK-MED ONCE .ROUTE ;  Start 6/7/20 at 14:48;  Stop

6/7/20 at 14:48;  Status DC


Lidocaine HCl (Lidocaine 1% 20ml Vial) 20 ml STK-MED ONCE .ROUTE ;  Start 6/7/20

at 14:48;  Stop 6/7/20 at 14:48;  Status DC


Midazolam HCl (Versed) 2 mg 1X  ONCE IV  Last administered on 6/7/20at 15:28;  

Start 6/7/20 at 15:00;  Stop 6/7/20 at 15:01;  Status DC


Fentanyl Citrate (Fentanyl 2ml Vial) 100 mcg 1X  ONCE IV  Last administered on 

6/7/20at 15:28;  Start 6/7/20 at 15:00;  Stop 6/7/20 at 15:01;  Status DC


Lidocaine HCl (Lidocaine 1% 20ml Vial) 20 ml 1X  ONCE INJ  Last administered on 

6/7/20at 15:30;  Start 6/7/20 at 15:00;  Stop 6/7/20 at 15:01;  Status DC


Sodium Chloride 1,000 ml @  100 mls/hr Q10H IV  Last administered on 6/16/20at 

07:30;  Start 6/7/20 at 20:00;  Stop 6/16/20 at 11:26;  Status DC


Sodium Bicarbonate (Sodium Bicarb Adult 8.4% Syr) 50 meq 1X  ONCE IV  Last 

administered on 6/7/20at 21:47;  Start 6/7/20 at 22:00;  Stop 6/7/20 at 22:01;  

Status DC


Potassium Acetate 40 meq/Magnesium Sulfate 5 meq/ Multivitamins 10 ml/Chromium/ 

Copper/Manganese/ Seleni/Zn 1 ml/ Insulin Human Regular 30 unit/ Total 

Parenteral Nutrition/Amino Acids/Dextrose/ Fat Emulsion Intravenous 1,920 ml @  

80 mls/hr TPN  CONT IV  Last administered on 6/8/20at 22:28;  Start 6/8/20 at 

22:00;  Stop 6/9/20 at 21:59;  Status DC


Sodium Chloride 500 ml @  500 mls/hr 1X  ONCE IV  Last administered on 6/9/20at 

06:39;  Start 6/9/20 at 06:45;  Stop 6/9/20 at 07:44;  Status DC


Potassium Acetate 40 meq/Magnesium Sulfate 5 meq/ Multivitamins 10 ml/Chromium/ 

Copper/Manganese/ Seleni/Zn 1 ml/ Insulin Human Regular 30 unit/ Total 

Parenteral Nutrition/Amino Acids/Dextrose/ Fat Emulsion Intravenous 1,920 ml @  

80 mls/hr TPN  CONT IV  Last administered on 6/9/20at 22:03;  Start 6/9/20 at 

22:00;  Stop 6/10/20 at 21:59;  Status DC


Metoprolol Tartrate (Lopressor Vial) 5 mg PRN Q6HRS  PRN IVP HYPERTENSION Last 

administered on 7/12/20at 18:01;  Start 6/10/20 at 09:00


Potassium Acetate 40 meq/Magnesium Sulfate 5 meq/ Multivitamins 10 ml/Chromium/ 

Copper/Manganese/ Seleni/Zn 1 ml/ Insulin Human Regular 30 unit/ Total 

Parenteral Nutrition/Amino Acids/Dextrose/ Fat Emulsion Intravenous 1,920 ml @  

80 mls/hr TPN  CONT IV  Last administered on 6/10/20at 21:26;  Start 6/10/20 at 

22:00;  Stop 6/11/20 at 21:59;  Status DC


Potassium Acetate 40 meq/Magnesium Sulfate 5 meq/ Multivitamins 10 ml/Chromium/ 

Copper/Manganese/ Seleni/Zn 1 ml/ Insulin Human Regular 30 unit/ Total 

Parenteral Nutrition/Amino Acids/Dextrose/ Fat Emulsion Intravenous 1,920 ml @  

80 mls/hr TPN  CONT IV  Last administered on 6/11/20at 23:23;  Start 6/11/20 at 

22:00;  Stop 6/12/20 at 21:59;  Status DC


Potassium Acetate 40 meq/Magnesium Sulfate 5 meq/ Multivitamins 10 ml/Chromium/ 

Copper/Manganese/ Seleni/Zn 1 ml/ Insulin Human Regular 30 unit/ Total Parent

eral Nutrition/Amino Acids/Dextrose/ Fat Emulsion Intravenous 1,920 ml @  80 

mls/hr TPN  CONT IV  Last administered on 6/12/20at 21:35;  Start 6/12/20 at 

22:00;  Stop 6/13/20 at 21:59;  Status DC


Furosemide (Lasix) 20 mg 1X  ONCE IVP  Last administered on 6/13/20at 06:26;  

Start 6/13/20 at 06:15;  Stop 6/13/20 at 06:16;  Status DC


Methylprednisolone Sodium Succinate (SOLU-Medrol 125MG VIAL) 125 mg 1X  ONCE IV 

Last administered on 6/13/20at 06:26;  Start 6/13/20 at 06:15;  Stop 6/13/20 at 

06:16;  Status DC


Albuterol/ Ipratropium (Duoneb) 3 ml Q4HRS NEB  Last administered on 7/16/20at 

07:52;  Start 6/13/20 at 08:00


Fentanyl Citrate 30 ml @ 0 mls/hr CONT  PRN IV SEE PROTOCOL Last administered on

7/4/20at 08:03;  Start 6/13/20 at 06:00;  Stop 7/4/20 at 12:42;  Status DC


Propofol 100 ml @ 0 mls/hr CONT  PRN IV SEE PROTOCOL Last administered on 

6/20/20at 23:50;  Start 6/13/20 at 06:00


Fentanyl Citrate (Fentanyl 2ml Vial) 25 mcg PRN Q1HR  PRN IV SEE COMMENTS Last 

administered on 7/16/20at 08:21;  Start 6/13/20 at 06:00


Fentanyl Citrate (Fentanyl 2ml Vial) 50 mcg PRN Q1HR  PRN IV SEE COMMENTS Last 

administered on 7/12/20at 18:02;  Start 6/13/20 at 06:00


Chlorhexidine Gluconate (Peridex) 15 ml BID MM ;  Start 6/13/20 at 09:00;  Stop 

6/13/20 at 07:58;  Status DC


Potassium Acetate 40 meq/Magnesium Sulfate 5 meq/ Multivitamins 10 ml/Chromium/ 

Copper/Manganese/ Seleni/Zn 1 ml/ Insulin Human Regular 30 unit/ Total 

Parenteral Nutrition/Amino Acids/Dextrose/ Fat Emulsion Intravenous 1,920 ml @  

80 mls/hr TPN  CONT IV  Last administered on 6/13/20at 21:19;  Start 6/13/20 at 

22:00;  Stop 6/14/20 at 21:59;  Status DC


Acetylcysteine (Mucomyst 20% Resp Treatment) 600 mg BID NEB  Last administered 

on 6/19/20at 09:33;  Start 6/13/20 at 21:00;  Stop 6/19/20 at 10:39;  Status DC


Magnesium Sulfate 100 ml @  25 mls/hr 1X  ONCE IV  Last administered on 

6/13/20at 15:48;  Start 6/13/20 at 15:45;  Stop 6/13/20 at 19:44;  Status DC


Potassium Acetate 40 meq/Magnesium Sulfate 5 meq/ Multivitamins 10 ml/Chromium/ 

Copper/Manganese/ Seleni/Zn 1 ml/ Insulin Human Regular 30 unit/ Total 

Parenteral Nutrition/Amino Acids/Dextrose/ Fat Emulsion Intravenous 1,920 ml @  

80 mls/hr TPN  CONT IV  Last administered on 6/14/20at 21:35;  Start 6/14/20 at 

22:00;  Stop 6/15/20 at 21:59;  Status DC


Potassium Chloride/Water 100 ml @  100 mls/hr Q1H IV  Last administered on 

6/15/20at 08:31;  Start 6/15/20 at 07:00;  Stop 6/15/20 at 08:59;  Status DC


Potassium Acetate 40 meq/Magnesium Sulfate 5 meq/ Multivitamins 10 ml/Chromium/ 

Copper/Manganese/ Seleni/Zn 1 ml/ Insulin Human Regular 30 unit/ Total 

Parenteral Nutrition/Amino Acids/Dextrose/ Fat Emulsion Intravenous 1,920 ml @  

80 mls/hr TPN  CONT IV  Last administered on 6/15/20at 21:54;  Start 6/15/20 at 

22:00;  Stop 6/16/20 at 19:34;  Status DC


Lidocaine HCl (Buffered Lidocaine 1%) 3 ml STK-MED ONCE .ROUTE ;  Start 6/15/20 

at 12:14;  Stop 6/15/20 at 12:14;  Status DC


Lidocaine HCl (Buffered Lidocaine 1%) 3 ml 1X  ONCE IJ  Last administered on 

6/15/20at 13:11;  Start 6/15/20 at 13:00;  Stop 6/15/20 at 13:01;  Status DC


Magnesium Sulfate 50 ml @ 25 mls/hr 1X  ONCE IV ;  Start 6/16/20 at 08:15;  Stop

6/16/20 at 10:14;  Status DC


Potassium Acetate 40 meq/Magnesium Sulfate 10 meq/ Multivitamins 10 ml/Chromium/

Copper/Manganese/ Seleni/Zn 1 ml/ Insulin Human Regular 20 unit/ Total 

Parenteral Nutrition/Amino Acids/Dextrose/ Fat Emulsion Intravenous 1,920 ml @  

80 mls/hr TPN  CONT IV  Last administered on 6/16/20at 21:32;  Start 6/16/20 at 

22:00;  Stop 6/17/20 at 21:59;  Status DC


Potassium Chloride/Water 100 ml @  100 mls/hr Q1H IV  Last administered on 

6/17/20at 09:12;  Start 6/17/20 at 08:00;  Stop 6/17/20 at 09:59;  Status DC


Alteplase, Recombinant (Cathflo For Central Catheter Clearance) 4 mg 1X  ONCE 

INT CAT ;  Start 6/17/20 at 09:15;  Stop 6/17/20 at 09:16;  Status UNV


Alteplase, Recombinant (Cathflo For Central Catheter Clearance) 4 mg 1X  ONCE 

INT CAT ;  Start 6/17/20 at 09:15;  Stop 6/17/20 at 09:16;  Status UNV


Alteplase, Recombinant (Cathflo For Central Catheter Clearance) 4 mg 1X  ONCE 

INT CAT ;  Start 6/17/20 at 09:15;  Stop 6/17/20 at 09:16;  Status UNV


Alteplase, Recombinant 4 mg/ Sodium Chloride 20 ml @ 20 mls/hr 1X  ONCE IV  Last

administered on 6/17/20at 10:10;  Start 6/17/20 at 10:00;  Stop 6/17/20 at 

10:59;  Status DC


Alteplase, Recombinant 4 mg/ Sodium Chloride 20 ml @ 20 mls/hr 1X  ONCE IV  Last

administered on 6/17/20at 10:09;  Start 6/17/20 at 10:00;  Stop 6/17/20 at 

10:59;  Status DC


Alteplase, Recombinant 4 mg/ Sodium Chloride 20 ml @ 20 mls/hr 1X  ONCE IV  Last

administered on 6/17/20at 10:09;  Start 6/17/20 at 10:00;  Stop 6/17/20 at 

10:59;  Status DC


Potassium Acetate 60 meq/Magnesium Sulfate 10 meq/ Multivitamins 10 ml/Chromium/

Copper/Manganese/ Seleni/Zn 1 ml/ Insulin Human Regular 20 unit/ Total 

Parenteral Nutrition/Amino Acids/Dextrose/ Fat Emulsion Intravenous 1,920 ml @  

80 mls/hr TPN  CONT IV  Last administered on 6/17/20at 21:55;  Start 6/17/20 at 

22:00;  Stop 6/18/20 at 21:59;  Status DC


Albumin Human 500 ml @  125 mls/hr 1X  ONCE IV  Last administered on 6/18/20at 

12:01;  Start 6/18/20 at 11:15;  Stop 6/18/20 at 15:14;  Status DC


Sodium Chloride 500 ml @  500 mls/hr 1X  ONCE IV  Last administered on 6/18/20at

13:50;  Start 6/18/20 at 11:15;  Stop 6/18/20 at 12:14;  Status DC


Potassium Acetate 60 meq/Magnesium Sulfate 14 meq/ Multivitamins 10 ml/Chromium/

Copper/Manganese/ Seleni/Zn 1 ml/ Insulin Human Regular 20 unit/ Total 

Parenteral Nutrition/Amino Acids/Dextrose/ Fat Emulsion Intravenous 1,920 ml @  

80 mls/hr TPN  CONT IV  Last administered on 6/18/20at 22:26;  Start 6/18/20 at 

22:00;  Stop 6/19/20 at 21:59;  Status DC


Ciprofloxacin/ Dextrose 200 ml @  200 mls/hr Q12HR IV  Last administered on 

6/25/20at 08:27;  Start 6/18/20 at 21:00;  Stop 6/25/20 at 08:56;  Status DC


Albumin Human 250 ml @  62.5 mls/hr 1X  ONCE IV  Last administered on 6/19/20at 

11:09;  Start 6/19/20 at 11:00;  Stop 6/19/20 at 14:59;  Status DC


Furosemide (Lasix) 20 mg 1X  ONCE IVP  Last administered on 6/19/20at 14:52;  

Start 6/19/20 at 10:45;  Stop 6/19/20 at 10:49;  Status DC


Potassium Acetate 60 meq/Magnesium Sulfate 14 meq/ Multivitamins 10 ml/Chromium/

Copper/Manganese/ Seleni/Zn 1 ml/ Insulin Human Regular 15 unit/ Total 

Parenteral Nutrition/Amino Acids/Dextrose/ Fat Emulsion Intravenous 1,920 ml @  

80 mls/hr TPN  CONT IV  Last administered on 6/19/20at 22:08;  Start 6/19/20 at 

22:00;  Stop 6/20/20 at 21:59;  Status DC


Potassium Acetate 60 meq/Magnesium Sulfate 14 meq/ Multivitamins 10 ml/Chromium/

Copper/Manganese/ Seleni/Zn 1 ml/ Insulin Human Regular 15 unit/ Total 

Parenteral Nutrition/Amino Acids/Dextrose/ Fat Emulsion Intravenous 1,920 ml @  

80 mls/hr TPN  CONT IV  Last administered on 6/20/20at 22:12;  Start 6/20/20 at 

22:00;  Stop 6/21/20 at 21:59;  Status DC


Potassium Acetate 60 meq/Magnesium Sulfate 14 meq/ Multivitamins 10 ml/Chromium/

Copper/Manganese/ Seleni/Zn 1 ml/ Insulin Human Regular 15 unit/ Total 

Parenteral Nutrition/Amino Acids/Dextrose/ Fat Emulsion Intravenous 1,920 ml @  

80 mls/hr TPN  CONT IV  Last administered on 6/21/20at 22:22;  Start 6/21/20 at 

22:00;  Stop 6/22/20 at 21:59;  Status DC


Furosemide (Lasix) 20 mg 1X  ONCE IVP  Last administered on 6/22/20at 11:07;  

Start 6/22/20 at 10:30;  Stop 6/22/20 at 10:34;  Status DC


Potassium Acetate 60 meq/Magnesium Sulfate 14 meq/ Multivitamins 10 ml/Chromium/

Copper/Manganese/ Seleni/Zn 1 ml/ Insulin Human Regular 15 unit/ Sodium Chloride

20 meq/Total Parenteral Nutrition/Amino Acids/Dextrose/ Fat Emulsion Intravenous

1,920 ml @  80 mls/hr TPN  CONT IV  Last administered on 6/22/20at 21:54;  Start

6/22/20 at 22:00;  Stop 6/23/20 at 21:59;  Status DC


Potassium Acetate 30 meq/Magnesium Sulfate 14 meq/ Multivitamins 10 ml/Chromium/

Copper/Manganese/ Seleni/Zn 1 ml/ Insulin Human Regular 15 unit/ Sodium Chloride

20 meq/Potassium Chloride 30 meq/ Total Parenteral Nutrition/Amino 

Acids/Dextrose/ Fat Emulsion Intravenous 1,920 ml @  80 mls/hr TPN  CONT IV  

Last administered on 6/23/20at 21:46;  Start 6/23/20 at 22:00;  Stop 6/24/20 at 

21:59;  Status DC


Sodium Chloride 80 meq/Potassium Chloride 30 meq/ Potassium Acetate 30 

meq/Magnesium Sulfate 14 meq/ Multivitamins 10 ml/Chromium/ Copper/Manganese/ 

Seleni/Zn 1 ml/ Insulin Human Regular 15 unit/ Total Parenteral Nutrition/Amino 

Acids/Dextrose/ Fat Emulsion Intravenous 1,920 ml @  80 mls/hr TPN  CONT IV  

Last administered on 6/24/20at 22:33;  Start 6/24/20 at 22:00;  Stop 6/25/20 at 

21:59;  Status DC


Furosemide (Lasix) 40 mg 1X  ONCE IVP  Last administered on 6/24/20at 16:27;  

Start 6/24/20 at 15:30;  Stop 6/24/20 at 15:33;  Status DC


Albumin Human 250 ml @  62.5 mls/hr 1X  ONCE IV  Last administered on 6/24/20at 

16:27;  Start 6/24/20 at 15:30;  Stop 6/24/20 at 19:29;  Status DC


Sodium Chloride 80 meq/Potassium Chloride 30 meq/ Potassium Acetate 30 

meq/Magnesium Sulfate 14 meq/ Multivitamins 10 ml/Chromium/ Copper/Manganese/ 

Seleni/Zn 1 ml/ Insulin Human Regular 15 unit/ Total Parenteral Nutrition/Amino 

Acids/Dextrose/ Fat Emulsion Intravenous 1,920 ml @  80 mls/hr TPN  CONT IV  

Last administered on 6/25/20at 22:25;  Start 6/25/20 at 22:00;  Stop 6/26/20 at 

21:59;  Status DC


Sodium Chloride 80 meq/Potassium Chloride 30 meq/ Potassium Acetate 30 

meq/Magnesium Sulfate 14 meq/ Multivitamins 10 ml/Chromium/ Copper/Manganese/ 

Seleni/Zn 1 ml/ Insulin Human Regular 15 unit/ Total Parenteral Nutrition/Amino 

Acids/Dextrose/ Fat Emulsion Intravenous 1,920 ml @  80 mls/hr TPN  CONT IV  

Last administered on 6/26/20at 21:32;  Start 6/26/20 at 22:00;  Stop 6/27/20 at 

21:59;  Status DC


Sodium Chloride 80 meq/Potassium Chloride 30 meq/ Potassium Acetate 30 

meq/Magnesium Sulfate 14 meq/ Multivitamins 10 ml/Chromium/ Copper/Manganese/ 

Seleni/Zn 1 ml/ Insulin Human Regular 15 unit/ Total Parenteral Nutrition/Amino 

Acids/Dextrose/ Fat Emulsion Intravenous 1,920 ml @  80 mls/hr TPN  CONT IV  

Last administered on 6/27/20at 21:53;  Start 6/27/20 at 22:00;  Stop 6/28/20 at 

21:59;  Status DC


Acetylcysteine (Mucomyst 20% Resp Treatment) 600 mg RTBID NEB  Last administered

on 7/16/20at 07:52;  Start 6/27/20 at 12:00


Sodium Chloride 80 meq/Potassium Chloride 30 meq/ Potassium Acetate 30 

meq/Magnesium Sulfate 14 meq/ Multivitamins 10 ml/Chromium/ Copper/Manganese/ 

Seleni/Zn 1 ml/ Insulin Human Regular 15 unit/ Total Parenteral Nutrition/Amino 

Acids/Dextrose/ Fat Emulsion Intravenous 1,920 ml @  80 mls/hr TPN  CONT IV  

Last administered on 6/28/20at 22:06;  Start 6/28/20 at 22:00;  Stop 6/29/20 at 

21:59;  Status DC


Meropenem 500 mg/ Sodium Chloride 50 ml @  100 mls/hr Q6HRS IV  Last 

administered on 7/16/20at 06:16;  Start 6/28/20 at 18:00


Daptomycin 500 mg/ Sodium Chloride 50 ml @  100 mls/hr Q24H IV  Last 

administered on 7/6/20at 21:47;  Start 6/28/20 at 19:00;  Stop 7/7/20 at 08:13; 

Status DC


Sodium Chloride 80 meq/Potassium Chloride 30 meq/ Potassium Acetate 30 

meq/Magnesium Sulfate 14 meq/ Multivitamins 10 ml/Chromium/ Copper/Manganese/ 

Seleni/Zn 1 ml/ Insulin Human Regular 15 unit/ Total Parenteral Nutrition/Amino 

Acids/Dextrose/ Fat Emulsion Intravenous 1,920 ml @  80 mls/hr TPN  CONT IV  

Last administered on 6/29/20at 22:09;  Start 6/29/20 at 22:00;  Stop 6/30/20 at 

21:59;  Status DC


Heparin Sodium (Porcine) 1000 unit/Sodium Chloride 1,001 ml @  1,001 mls/hr 1X  

ONCE IRR ;  Start 6/30/20 at 06:00;  Stop 6/30/20 at 06:59;  Status DC


Propofol (Diprivan) 200 mg STK-MED ONCE IV ;  Start 6/30/20 at 07:44;  Stop 

6/30/20 at 07:44;  Status DC


Lidocaine HCl (Lidocaine Pf 2% Vial) 5 ml STK-MED ONCE .ROUTE ;  Start 6/30/20 

at 07:44;  Stop 6/30/20 at 07:44;  Status DC


Fentanyl Citrate (Fentanyl 2ml Vial) 100 mcg STK-MED ONCE .ROUTE ;  Start 

6/30/20 at 07:44;  Stop 6/30/20 at 07:44;  Status DC


Rocuronium Bromide (Zemuron) 100 mg STK-MED ONCE .ROUTE ;  Start 6/30/20 at 

07:44;  Stop 6/30/20 at 07:44;  Status DC


Micafungin Sodium 100 mg/Dextrose 100 ml @  100 mls/hr Q24H IV  Last 

administered on 7/16/20at 08:19;  Start 6/30/20 at 08:30


Bupivacaine HCl/ Epinephrine Bitart (Sensorcain-Epi 0.5%-1:[id number] Mpf) 30 ml 

STK-MED ONCE .ROUTE ;  Start 6/30/20 at 08:34;  Stop 6/30/20 at 08:35;  Status 

DC


Iohexol (Omnipaque 300 Mg/ml) 50 ml STK-MED ONCE .ROUTE  Last administered on 

6/30/20at 13:30;  Start 6/30/20 at 08:35;  Stop 6/30/20 at 08:35;  Status DC


Sodium Chloride 80 meq/Potassium Chloride 30 meq/ Potassium Acetate 30 

meq/Magnesium Sulfate 14 meq/ Multivitamins 10 ml/Chromium/ Copper/Manganese/ 

Seleni/Zn 1 ml/ Insulin Human Regular 15 unit/ Total Parenteral Nutrition/Amino 

Acids/Dextrose/ Fat Emulsion Intravenous 1,920 ml @  80 mls/hr TPN  CONT IV  

Last administered on 7/1/20at 01:22;  Start 6/30/20 at 22:00;  Stop 7/1/20 at 

21:59;  Status DC


Phenylephrine HCl (Ken-Synephrine Inj) 10 mg STK-MED ONCE .ROUTE ;  Start 

6/30/20 at 10:15;  Stop 6/30/20 at 10:15;  Status DC


Desflurane (Suprane) 90 ml STK-MED ONCE IH ;  Start 6/30/20 at 10:18;  Stop 

6/30/20 at 10:19;  Status DC


Albumin Human 500 ml @  As Directed STK-MED ONCE IV ;  Start 6/30/20 at 11:06;  

Stop 6/30/20 at 11:06;  Status DC


Vasopressin (Vasostrict) 20 unit STK-MED ONCE .ROUTE ;  Start 6/30/20 at 12:23; 

Stop 6/30/20 at 12:23;  Status DC


Phenylephrine HCl (Ken-Synephrine Inj) 10 mg STK-MED ONCE .ROUTE ;  Start 

6/30/20 at 13:33;  Stop 6/30/20 at 13:33;  Status DC


Phenylephrine HCl (Ken-Synephrine Inj) 10 mg STK-MED ONCE .ROUTE ;  Start 

6/30/20 at 13:33;  Stop 6/30/20 at 13:33;  Status DC


Ondansetron HCl (Zofran) 4 mg STK-MED ONCE .ROUTE ;  Start 6/30/20 at 13:33;  

Stop 6/30/20 at 13:33;  Status DC


Enoxaparin Sodium (Lovenox 40mg Syringe) 40 mg Q24H SQ  Last administered on 7/ 16/20at 08:18;  Start 7/1/20 at 08:00


Sodium Chloride (Normal Saline Flush) 3 ml QSHIFT  PRN IV AFTER MEDS AND BLOOD 

DRAWS;  Start 6/30/20 at 14:45


Naloxone HCl (Narcan) 0.4 mg PRN Q2MIN  PRN IV SEE INSTRUCTIONS;  Start 6/30/20 

at 14:45


Sodium Chloride 1,000 ml @  25 mls/hr Q24H IV  Last administered on 7/15/20at 

14:23;  Start 6/30/20 at 14:33


Morphine Sulfate (Morphine Sulfate) 1 mg PRN Q1HR  PRN IV PAIN;  Start 6/30/20 

at 14:45


Midazolam HCl 100 mg/Sodium Chloride 100 ml @ 1 mls/hr CONT  PRN IV SEE I/O RE

CORD Last administered on 7/3/20at 18:48;  Start 6/30/20 at 14:45


Phenylephrine HCl (PHENYLEPHRINE in 0.9% NACL PF) 1 mg STK-MED ONCE IV ;  Start 

6/30/20 at 14:44;  Stop 6/30/20 at 14:45;  Status DC


Ephedrine Sulfate (ePHEDrine PF IN SALINE SYRINGE) 50 mg STK-MED ONCE IV ;  

Start 6/30/20 at 14:45;  Stop 6/30/20 at 14:45;  Status DC


Vasopressin 20 unit/Dextrose 101 ml @  12 mls/hr CONT  PRN IV SEE I/O RECORD 

Last administered on 7/7/20at 04:17;  Start 6/30/20 at 15:30


Sodium Chloride 1,000 ml @  1,000 mls/hr 1X  ONCE IV  Last administered on 

6/30/20at 15:42;  Start 6/30/20 at 15:45;  Stop 6/30/20 at 16:44;  Status DC


Albumin Human 500 ml @  125 mls/hr 1X  ONCE IV ;  Start 6/30/20 at 16:00;  Stop 

6/30/20 at 19:59;  Status DC


Albumin Human 500 ml @  125 mls/hr PRN Q1HR  PRN IV PER PROTOCOL;  Start 6/30/20

at 15:45


Magnesium Sulfate 50 ml @ 25 mls/hr 1X  ONCE IV  Last administered on 6/30/20at 

17:02;  Start 6/30/20 at 16:30;  Stop 6/30/20 at 18:29;  Status DC


Sodium Bicarbonate (Sodium Bicarb Adult 8.4% Syr) 50 meq STK-MED ONCE .ROUTE ;  

Start 6/30/20 at 16:20;  Stop 6/30/20 at 16:20;  Status DC


Sodium Bicarbonate (Sodium Bicarb Adult 8.4% Syr) 100 meq 1X  ONCE IV  Last 

administered on 6/30/20at 17:07;  Start 6/30/20 at 16:30;  Stop 6/30/20 at 

16:31;  Status DC


Sodium Bicarbonate 150 meq/Dextrose 1,150 ml @  75 mls/hr 1X  ONCE IV  Last 

administered on 6/30/20at 20:02;  Start 6/30/20 at 16:30;  Stop 7/1/20 at 07:49;

 Status DC


Sodium Chloride 80 meq/Potassium Chloride 30 meq/ Potassium Acetate 30 

meq/Magnesium Sulfate 14 meq/ Multivitamins 10 ml/Chromium/ Copper/Manganese/ 

Seleni/Zn 1 ml/ Insulin Human Regular 15 unit/ Total Parenteral Nutrition/Amino 

Acids/Dextrose/ Fat Emulsion Intravenous 1,920 ml @  80 mls/hr TPN  CONT IV  

Last administered on 7/1/20at 23:05;  Start 7/1/20 at 22:00;  Stop 7/2/20 at 

21:59;  Status DC


Sodium Chloride 100 meq/Potassium Chloride 30 meq/ Potassium Acetate 30 m

eq/Magnesium Sulfate 12 meq/ Multivitamins 10 ml/Chromium/ Copper/Manganese/ 

Seleni/Zn 1 ml/ Insulin Human Regular 15 unit/ Total Parenteral Nutrition/Amino 

Acids/Dextrose/ Fat Emulsion Intravenous 1,920 ml @  80 mls/hr TPN  CONT IV  

Last administered on 7/2/20at 21:52;  Start 7/2/20 at 22:00;  Stop 7/3/20 at 

21:59;  Status DC


Sodium Chloride 100 meq/Potassium Chloride 30 meq/ Potassium Acetate 30 

meq/Magnesium Sulfate 12 meq/ Multivitamins 10 ml/Chromium/ Copper/Manganese/ 

Seleni/Zn 1 ml/ Insulin Human Regular 15 unit/ Total Parenteral Nutrition/Amino 

Acids/Dextrose/ Fat Emulsion Intravenous 1,920 ml @  80 mls/hr TPN  CONT IV  

Last administered on 7/3/20at 21:46;  Start 7/3/20 at 22:00;  Stop 7/4/20 at 

21:59;  Status DC


Sodium Chloride 100 meq/Potassium Chloride 30 meq/ Potassium Acetate 30 

meq/Magnesium Sulfate 12 meq/ Multivitamins 10 ml/Chromium/ Copper/Manganese/ 

Seleni/Zn 1 ml/ Insulin Human Regular 15 unit/ Total Parenteral Nutrition/Amino 

Acids/Dextrose/ Fat Emulsion Intravenous 1,800 ml @  75 mls/hr TPN  CONT IV  

Last administered on 7/4/20at 22:04;  Start 7/4/20 at 22:00;  Stop 7/5/20 at 

21:59;  Status DC


Fentanyl Citrate 55 ml @ 0 mls/hr CONT  PRN IV SEE COMMENTS Last administered on

7/6/20at 23:55;  Start 7/4/20 at 13:00;  Stop 7/9/20 at 17:28;  Status DC


Sodium Chloride 100 meq/Potassium Chloride 30 meq/ Potassium Acetate 30 

meq/Magnesium Sulfate 12 meq/ Multivitamins 10 ml/Chromium/ Copper/Manganese/ 

Seleni/Zn 1 ml/ Insulin Human Regular 15 unit/ Total Parenteral Nutrition/Amino 

Acids/Dextrose/ Fat Emulsion Intravenous 1,680 ml @  70 mls/hr TPN  CONT IV  

Last administered on 7/5/20at 21:23;  Start 7/5/20 at 22:00;  Stop 7/6/20 at 

21:59;  Status DC


Sodium Chloride 110 meq/Potassium Chloride 30 meq/ Potassium Acetate 30 

meq/Magnesium Sulfate 15 meq/ Multivitamins 10 ml/Chromium/ Copper/Manganese/ 

Seleni/Zn 1 ml/ Insulin Human Regular 15 unit/ Total Parenteral Nutrition/Amino 

Acids/Dextrose/ Fat Emulsion Intravenous 1,680 ml @  70 mls/hr TPN  CONT IV  

Last administered on 7/6/20at 21:48;  Start 7/6/20 at 22:00;  Stop 7/7/20 at 

21:59;  Status DC


Sodium Chloride 110 meq/Potassium Chloride 30 meq/ Potassium Acetate 30 

meq/Magnesium Sulfate 15 meq/ Multivitamins 10 ml/Chromium/ Copper/Manganese/ 

Seleni/Zn 1 ml/ Insulin Human Regular 15 unit/ Total Parenteral Nutrition/Amino 

Acids/Dextrose/ Fat Emulsion Intravenous 1,680 ml @  70 mls/hr TPN  CONT IV  

Last administered on 7/7/20at 21:33;  Start 7/7/20 at 22:00;  Stop 7/8/20 at 

21:59;  Status DC


Sodium Chloride 110 meq/Potassium Chloride 30 meq/ Potassium Acetate 30 

meq/Magnesium Sulfate 15 meq/ Multivitamins 10 ml/Chromium/ Copper/Manganese/ 

Seleni/Zn 1 ml/ Insulin Human Regular 15 unit/ Total Parenteral Nutrition/Amino 

Acids/Dextrose/ Fat Emulsion Intravenous 1,680 ml @  70 mls/hr TPN  CONT IV  

Last administered on 7/8/20at 21:51;  Start 7/8/20 at 22:00;  Stop 7/9/20 at 

21:59;  Status DC


Sodium Chloride 90 meq/Potassium Chloride 30 meq/ Potassium Acetate 30 

meq/Magnesium Sulfate 15 meq/ Multivitamins 10 ml/Chromium/ Copper/Manganese/ 

Seleni/Zn 1 ml/ Insulin Human Regular 15 unit/ Total Parenteral Nutrition/Amino 

Acids/Dextrose/ Fat Emulsion Intravenous 1,680 ml @  70 mls/hr TPN  CONT IV  

Last administered on 7/9/20at 22:38;  Start 7/9/20 at 22:00;  Stop 7/10/20 at 

21:59;  Status DC


Fentanyl Citrate 30 ml @ 0 mls/hr CONT  PRN IV SEE I/O RECORD;  Start 7/9/20 at 

17:30


Fentanyl (Duragesic 12mcg/ Hr Patch) 1 patch Q3DAYS TD  Last administered on 

7/16/20at 08:27;  Start 7/10/20 at 09:00


Sodium Chloride 90 meq/Potassium Chloride 30 meq/ Potassium Acetate 30 

meq/Magnesium Sulfate 15 meq/ Multivitamins 10 ml/Chromium/ Copper/Manganese/ 

Seleni/Zn 1 ml/ Insulin Human Regular 15 unit/ Total Parenteral Nutrition/Amino 

Acids/Dextrose/ Fat Emulsion Intravenous 1,680 ml @  70 mls/hr TPN  CONT IV  

Last administered on 7/10/20at 21:59;  Start 7/10/20 at 22:00;  Stop 7/11/20 at 

21:59;  Status DC


Sodium Chloride 90 meq/Potassium Chloride 30 meq/ Potassium Acetate 30 

meq/Magnesium Sulfate 15 meq/ Multivitamins 10 ml/Chromium/ Copper/Manganese/ 

Seleni/Zn 1 ml/ Insulin Human Regular 15 unit/ Total Parenteral Nutrition/Amino 

Acids/Dextrose/ Fat Emulsion Intravenous 1,680 ml @  70 mls/hr TPN  CONT IV  

Last administered on 7/11/20at 21:35;  Start 7/11/20 at 22:00;  Stop 7/12/20 at 

21:59;  Status DC


Vancomycin HCl (Vanco Per Pharmacy) 1 each PRN DAILY  PRN MC SEE COMMENTS Last 

administered on 7/14/20at 02:46;  Start 7/12/20 at 09:15;  Stop 7/15/20 at 

07:41;  Status DC


Ciprofloxacin/ Dextrose 200 ml @  200 mls/hr Q12HR IV  Last administered on 

7/15/20at 20:44;  Start 7/12/20 at 10:00


Vancomycin HCl 2 gm/Sodium Chloride 500 ml @  250 mls/hr 1X  ONCE IV  Last 

administered on 7/12/20at 10:34;  Start 7/12/20 at 10:00;  Stop 7/12/20 at 

11:59;  Status DC


Sodium Chloride 90 meq/Potassium Chloride 30 meq/ Potassium Acetate 30 

meq/Magnesium Sulfate 15 meq/ Multivitamins 10 ml/Chromium/ Copper/Manganese/ 

Seleni/Zn 1 ml/ Insulin Human Regular 15 unit/ Total Parenteral Nutrition/Amino 

Acids/Dextrose/ Fat Emulsion Intravenous 1,680 ml @  70 mls/hr TPN  CONT IV  

Last administered on 7/12/20at 22:02;  Start 7/12/20 at 22:00;  Stop 7/13/20 at 

21:59;  Status DC


Diphenhydramine HCl (Benadryl) 25 mg 1X  ONCE IVP  Last administered on 7/12/ 20at 14:26;  Start 7/12/20 at 14:30;  Stop 7/12/20 at 14:31;  Status DC


Vancomycin HCl 1.5 gm/Sodium Chloride 500 ml @  250 mls/hr Q8H IV  Last 

administered on 7/13/20at 03:08;  Start 7/12/20 at 18:30;  Stop 7/13/20 at 

12:24;  Status DC


Vancomycin HCl (Vancomycin Trough Level) 1 each 1X  ONCE MC  Last administered 

on 7/13/20at 10:00;  Start 7/13/20 at 10:00;  Stop 7/13/20 at 10:01;  Status DC


Sodium Chloride 90 meq/Potassium Chloride 30 meq/ Potassium Acetate 30 

meq/Magnesium Sulfate 15 meq/ Multivitamins 10 ml/Chromium/ Copper/Manganese/ 

Seleni/Zn 1 ml/ Insulin Human Regular 15 unit/ Total Parenteral Nutrition/Amino 

Acids/Dextrose/ Fat Emulsion Intravenous 1,680 ml @  70 mls/hr TPN  CONT IV  

Last administered on 7/13/20at 22:13;  Start 7/13/20 at 22:00;  Stop 7/14/20 at 

21:59;  Status DC


Vancomycin HCl (Vancomycin Random Level) 1 each 1X  ONCE MC  Last administered 

on 7/14/20at 01:00;  Start 7/14/20 at 01:00;  Stop 7/14/20 at 01:01;  Status DC


Vancomycin HCl 1.5 gm/Sodium Chloride 500 ml @  250 mls/hr Q12H IV  Last 

administered on 7/14/20at 22:07;  Start 7/14/20 at 10:00;  Stop 7/15/20 at 

07:41;  Status DC


Vancomycin HCl (Vancomycin Trough Level) 1 each 1X  ONCE MC ;  Start 7/15/20 at 

09:30;  Stop 7/15/20 at 09:31;  Status Cancel


Sodium Chloride 90 meq/Potassium Chloride 30 meq/ Potassium Acetate 30 

meq/Magnesium Sulfate 15 meq/ Multivitamins 10 ml/Chromium/ Copper/Manganese/ 

Seleni/Zn 1 ml/ Insulin Human Regular 15 unit/ Total Parenteral Nutrition/Amino 

Acids/Dextrose/ Fat Emulsion Intravenous 1,680 ml @  70 mls/hr TPN  CONT IV  

Last administered on 7/14/20at 22:08;  Start 7/14/20 at 22:00;  Stop 7/15/20 at 

21:59;  Status DC


Alteplase, Recombinant (Cathflo For Central Catheter Clearance) 1 mg 1X  ONCE 

INT CAT  Last administered on 7/14/20at 11:49;  Start 7/14/20 at 11:00;  Stop 

7/14/20 at 11:01;  Status DC


Daptomycin 500 mg/ Sodium Chloride 50 ml @  100 mls/hr Q24H IV  Last administere

d on 7/15/20at 08:59;  Start 7/15/20 at 09:00


Sodium Chloride 90 meq/Potassium Chloride 30 meq/ Potassium Acetate 30 

meq/Magnesium Sulfate 15 meq/ Multivitamins 10 ml/Chromium/ Copper/Manganese/ 

Seleni/Zn 1 ml/ Insulin Human Regular 15 unit/ Total Parenteral Nutrition/Amino 

Acids/Dextrose/ Fat Emulsion Intravenous 1,680 ml @  70 mls/hr TPN  CONT IV  

Last administered on 7/15/20at 22:55;  Start 7/15/20 at 22:00;  Stop 7/16/20 at 

21:59





Active Scripts


Active


Reported


Bisoprolol Fumarate 5 Mg Tablet 10 Mg PO DAILY


Vitals/I & O





Vital Sign - Last 24 Hours








 7/15/20 7/15/20 7/15/20 7/15/20





 11:00 11:55 12:00 12:00


 


Temp   99.9 





   99.9 


 


Pulse 120  116 


 


Resp 28  28 


 


B/P (MAP) 132/85 (101)  137/85 (102) 


 


Pulse Ox 100 100 100 


 


O2 Delivery Tracheal Collar Tracheal Collar Tracheal Collar Trach Collar


 


O2 Flow Rate  8.0  8.0


 


    





    





 7/15/20 7/15/20 7/15/20 7/15/20





 13:00 14:00 15:00 16:00


 


Pulse 118 138 128 


 


Resp 28 28 28 


 


B/P (MAP) 155/91 (112)  130/80 (97) 


 


Pulse Ox 100 100 100 


 


O2 Delivery Tracheal Collar Tracheal Collar Tracheal Collar Trach Collar


 


O2 Flow Rate    8.0





 7/15/20 7/15/20 7/15/20 7/15/20





 16:00 16:09 17:00 18:00


 


Temp 99.9   





 99.9   


 


Pulse 142  140 140


 


Resp 28  28 28


 


B/P (MAP)    158/91 (113)


 


Pulse Ox 100 100 100 100


 


O2 Delivery Tracheal Collar Tracheal Collar Tracheal Collar Tracheal Collar


 


O2 Flow Rate  8.0  


 


    





    





 7/15/20 7/15/20 7/15/20 7/15/20





 19:00 20:00 20:00 20:11


 


Temp  101.4  





  101.4  


 


Pulse 138 136  


 


Resp 37 34  


 


B/P (MAP) 169/84 (112) 145/80 (101)  


 


Pulse Ox 98 100  100


 


O2 Delivery Tracheal Collar Tracheal Collar Trach Collar Tracheal Collar


 


O2 Flow Rate   8.0 8.0


 


    





    





 7/15/20 7/15/20 7/15/20 7/15/20





 20:12 21:00 22:00 23:00


 


Pulse  120 118 102


 


Resp  29 25 29


 


B/P (MAP)  133/73 (93) 122/74 (90) 103/92 (96)


 


Pulse Ox 100 100 99 99


 


O2 Delivery Tracheal Collar Tracheal Collar Tracheal Collar Tracheal Collar


 


O2 Flow Rate 8.0   





 7/16/20 7/16/20 7/16/20 7/16/20





 00:00 00:00 00:05 01:00


 


Temp 98.1   





 98.1   


 


Pulse 114   108


 


Resp 25   24


 


B/P (MAP) 132/77 (95)   134/79 (97)


 


Pulse Ox 98  99 100


 


O2 Delivery Tracheal Collar Trach Collar Tracheal Collar Tracheal Collar


 


O2 Flow Rate  8.0 8.0 


 


    





    





 7/16/20 7/16/20 7/16/20 7/16/20





 02:00 03:00 03:34 04:00


 


Pulse 110 110  


 


Resp 25 36  


 


B/P (MAP) 135/83 (100) 133/81 (98)  


 


Pulse Ox 95 98 99 


 


O2 Delivery Tracheal Collar Tracheal Collar Tracheal Collar Trach Collar


 


O2 Flow Rate   8.0 8.0





 7/16/20 7/16/20 7/16/20 7/16/20





 04:00 05:00 06:00 07:00


 


Temp 98.5   





 98.5   


 


Pulse 107 121 120 116


 


Resp 30 35 36 30


 


B/P (MAP) 147/86 (106) 160/93 (115) 173/89 (117) 136/83 (100)


 


Pulse Ox 100 96 96 96


 


O2 Delivery Tracheal Collar Tracheal Collar Tracheal Collar Tracheal Collar


 


    





    





 7/16/20 7/16/20 7/16/20 7/16/20





 08:00 08:00 08:21 08:27


 


Temp 99.0   





 99.0   


 


Pulse 120   


 


Resp 30   


 


B/P (MAP) 150/91 (110)   


 


Pulse Ox 100  96 96


 


O2 Delivery Tracheal Collar Trach Collar  


 


O2 Flow Rate  8.0 8.0 8.0


 


    





    





 7/16/20 7/16/20  





 08:51 09:00  


 


Pulse  120  


 


Resp  30  


 


B/P (MAP)  151/80 (103)  


 


Pulse Ox 96 96  


 


O2 Delivery  Tracheal Collar  


 


O2 Flow Rate 8.0   














Intake and Output   


 


 7/15/20 7/15/20 7/16/20





 15:00 23:00 07:00


 


Intake Total 250 ml 1341 ml 1078 ml


 


Output Total 940 ml 1950 ml 1770 ml


 


Balance -690 ml -609 ml -692 ml











Justicifation of Admission Dx:


Justifications for Admission:


Justification of Admission Dx:  Yes











CHEY TURNER MD              Jul 16, 2020 10:41

## 2020-07-16 NOTE — PDOC
Objective:


Objective:


Nurse asking about restarting tube feeds.


Vital Signs:





                                   Vital Signs








  Date Time  Temp Pulse Resp B/P (MAP) Pulse Ox O2 Delivery O2 Flow Rate FiO2


 


7/16/20 09:00  120 30 151/80 (103) 96 Tracheal Collar  


 


7/16/20 08:51       8.0 


 


7/16/20 08:00 99.0       





 99.0       








Labs:





Laboratory Tests








Test


 7/15/20


18:41 7/16/20


00:12 7/16/20


06:15


 


Glucose (Fingerstick) 141 mg/dL  125 mg/dL  141 mg/dL 


 


White Blood Count   8.5 x10^3/uL 


 


Red Blood Count   2.61 x10^6/uL 


 


Hemoglobin   7.4 g/dL 


 


Hematocrit   22.6 % 


 


Mean Corpuscular Volume   87 fL 


 


Mean Corpuscular Hemoglobin   29 pg 


 


Mean Corpuscular Hemoglobin


Concent 


 


 33 g/dL 





 


Red Cell Distribution Width   15.1 % 


 


Platelet Count   580 x10^3/uL 


 


Neutrophils (%) (Auto)   68 % 


 


Lymphocytes (%) (Auto)   14 % 


 


Monocytes (%) (Auto)   14 % 


 


Eosinophils (%) (Auto)   4 % 


 


Basophils (%) (Auto)   0 % 


 


Neutrophils # (Auto)   5.8 x10^3/uL 


 


Lymphocytes # (Auto)   1.2 x10^3/uL 


 


Monocytes # (Auto)   1.2 x10^3/uL 


 


Eosinophils # (Auto)   0.3 x10^3/uL 


 


Basophils # (Auto)   0.0 x10^3/uL 


 


Sodium Level   136 mmol/L 


 


Potassium Level   3.9 mmol/L 


 


Chloride Level   103 mmol/L 


 


Carbon Dioxide Level   29 mmol/L 


 


Anion Gap   4 


 


Blood Urea Nitrogen   10 mg/dL 


 


Creatinine   0.6 mg/dL 


 


Estimated GFR


(Cockcroft-Gault) 


 


 106.3 





 


Glucose Level   153 mg/dL 


 


Calcium Level   8.7 mg/dL 





  BLOOD CULTURE  Preliminary  


        NO GROWTH AFTER 4 DAYS





PE:





GEN: chronically ill


LUNGS: trach collar


HEART: tachycardic


ABD: drains


NEURO/PSYCH: waves hello





A/P:


S/p pancreatic necrosectomy





--


Continue support.





Justicifation of Admission Dx:


Justifications for Admission:


Justification of Admission Dx:  Yes











RAGHAVENDRA MURCIA         Jul 16, 2020 10:43

## 2020-07-16 NOTE — PDOC
Infectious Disease Note


Subjective


Subjective


Patient is awake , off ventilator





ROS


ROS


no n/v/d/fever





Vital Sign


Vital Signs





Vital Signs








  Date Time  Temp Pulse Resp B/P (MAP) Pulse Ox O2 Delivery O2 Flow Rate FiO2


 


7/16/20 06:00  120 36 173/89 (117) 96 Tracheal Collar  


 


7/16/20 04:00 98.5       





 98.5       


 


7/16/20 04:00       8.0 











Physical Exam


PHYSICAL EXAM


GENERAL: Propped up in bed, awake, weak appearing 


HEENT: Pupils equal, oral cavity dry. NGT out, on vent


NECK:  Tracheostomy 


LUNGS: Diminished aeration bases,  CT on left 


HEART:  S1, S2, regular, tachy 


ABDOMEN: Sightly less distended, bowel sounds hypoactive, soft, richardson x 2, 3 ROBERT

drains, G-J tube and + wound vac 


: [place] in place 


EXTREMITIES: Generalized edema, no cyanosis. SCDs & Podus boots bilaterally, 


SKIN: warm touch. No signs of rash.  


NEURO: awake, mouthing some words, tracking 


LUE-PICC without signs of complications 


LUE art-line out, mottling about old art-line site is improving. RP palpable, 

cap refill brisk.





Labs


Lab





Laboratory Tests








Test


 7/15/20


18:41 7/16/20


00:12 7/16/20


06:15


 


Glucose (Fingerstick)


 141 mg/dL


(70-99) 125 mg/dL


(70-99) 141 mg/dL


(70-99)


 


White Blood Count


 


 


 8.5 x10^3/uL


(4.0-11.0)


 


Red Blood Count


 


 


 2.61 x10^6/uL


(3.50-5.40)


 


Hemoglobin


 


 


 7.4 g/dL


(12.0-15.5)


 


Hematocrit


 


 


 22.6 %


(36.0-47.0)


 


Mean Corpuscular Volume   87 fL () 


 


Mean Corpuscular Hemoglobin   29 pg (25-35) 


 


Mean Corpuscular Hemoglobin


Concent 


 


 33 g/dL


(31-37)


 


Red Cell Distribution Width


 


 


 15.1 %


(11.5-14.5)


 


Platelet Count


 


 


 580 x10^3/uL


(140-400)


 


Neutrophils (%) (Auto)   68 % (31-73) 


 


Lymphocytes (%) (Auto)   14 % (24-48) 


 


Monocytes (%) (Auto)   14 % (0-9) 


 


Eosinophils (%) (Auto)   4 % (0-3) 


 


Basophils (%) (Auto)   0 % (0-3) 


 


Neutrophils # (Auto)


 


 


 5.8 x10^3/uL


(1.8-7.7)


 


Lymphocytes # (Auto)


 


 


 1.2 x10^3/uL


(1.0-4.8)


 


Monocytes # (Auto)


 


 


 1.2 x10^3/uL


(0.0-1.1)


 


Eosinophils # (Auto)


 


 


 0.3 x10^3/uL


(0.0-0.7)


 


Basophils # (Auto)


 


 


 0.0 x10^3/uL


(0.0-0.2)


 


Sodium Level


 


 


 136 mmol/L


(136-145)


 


Potassium Level


 


 


 3.9 mmol/L


(3.5-5.1)


 


Chloride Level


 


 


 103 mmol/L


()


 


Carbon Dioxide Level


 


 


 29 mmol/L


(21-32)


 


Anion Gap   4 (6-14) 


 


Blood Urea Nitrogen


 


 


 10 mg/dL


(7-20)


 


Creatinine


 


 


 0.6 mg/dL


(0.6-1.0)


 


Estimated GFR


(Cockcroft-Gault) 


 


 106.3 





 


Glucose Level


 


 


 153 mg/dL


(70-99)


 


Calcium Level


 


 


 8.7 mg/dL


(8.5-10.1)








Micro








Objective


Assessment


Patient with prolonged hospitalization more than 3 months


Multiple medical problems


Multiple surgical procedures





S/P Exp. 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 

parapsilosis & PSAE (I-merrem/ceftazidime/AZT/cefepime))


Leucocytosis -trending upward 


Fever 


Acute gallstone pancreatitis with persistent necrosis


  - 4/9.  CT A/P Increased ascites. Persistent evidence of necrotizing 

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


  - 4/27. status post ROBERT drain placement; C. parapsilosis. s/p drain 5/6 + yeast

& high amylase; s/p additional drain on 5/8. Drains removed. 


  -5/6. fluid  candida parapsilosis fluid, amylase high


  - 6/6 showed multiple pseudocysts, slight larger on the right. s/p drains x 3,

6/7.  + PSAE (MDRO-R Cefepime, Zosyn ANSON < 64) and yeast, 


  -6/7 s/p drain replacement x 3; fluid cult PSAE (MDRO), yeast; treated


  -7/12 CT A/P shows smaller fluid collections.         


Ascites s/p paracentesis 4/15 & 5/6. C. parapsilosis 


Cholelithiasis with thickening of the gallbladder wall.


JED, Hyperkalemia, Metabolic acidosis off dialysis


Acute hypoxic resp failure. trach/vent. sputum 6/13  + PSAE (I merrem) 


Pleural effusions s/p left thoracentesis, 5/12. no culture. s/p left chest tube,

6/15 no growth


Hypocalcemia 


Prediabetes


HTN


Anemia s/p PRBCs





Plan


Plan of Care


Meropenem, cipro , micafungin and dapto


repeat bc


Labs in am


wound care /drain management as directed


Contact isolation for CRE/MDRO


D/w nursing 





Critically ill








 long term prognosis poor











LINN FRANZ MD               Jul 16, 2020 07:55

## 2020-07-16 NOTE — PDOC
PULMONARY PROGRESS NOTES


Subjective


Nothing new today patient with no significant respiratory distress currently on 

trach shield


Vitals





Vital Signs








  Date Time  Temp Pulse Resp B/P (MAP) Pulse Ox O2 Delivery O2 Flow Rate FiO2


 


7/16/20 09:00  120 30 151/80 (103) 96 Tracheal Collar  


 


7/16/20 08:51       8.0 


 


7/16/20 08:00 99.0       





 99.0       








ROS:  No Nausea, No Chest Pain, No Increase Cough


General:  Alert


HEENT:  Other (trach site ok)


Lungs:  Crackles


Cardiovascular:  S1, S2


Abdomen:  Soft, Non-tender, Other (multiple ROBERT drains )


Neuro Exam:  Alert


Extremities:  Other (+1 BLE edema)


Skin:  Warm


Labs





Laboratory Tests








Test


 7/14/20


12:34 7/14/20


17:49 7/15/20


00:06 7/15/20


05:52


 


Glucose (Fingerstick)


 148 mg/dL


(70-99) 122 mg/dL


(70-99) 119 mg/dL


(70-99) 116 mg/dL


(70-99)


 


Test


 7/15/20


06:00 7/15/20


18:41 7/16/20


00:12 7/16/20


06:15


 


White Blood Count


 10.1 x10^3/uL


(4.0-11.0) 


 


 8.5 x10^3/uL


(4.0-11.0)


 


Red Blood Count


 2.44 x10^6/uL


(3.50-5.40) 


 


 2.61 x10^6/uL


(3.50-5.40)


 


Hemoglobin


 7.0 g/dL


(12.0-15.5) 


 


 7.4 g/dL


(12.0-15.5)


 


Hematocrit


 21.2 %


(36.0-47.0) 


 


 22.6 %


(36.0-47.0)


 


Mean Corpuscular Volume 87 fL ()    87 fL () 


 


Mean Corpuscular Hemoglobin 29 pg (25-35)    29 pg (25-35) 


 


Mean Corpuscular Hemoglobin


Concent 33 g/dL


(31-37) 


 


 33 g/dL


(31-37)


 


Red Cell Distribution Width


 14.8 %


(11.5-14.5) 


 


 15.1 %


(11.5-14.5)


 


Platelet Count


 551 x10^3/uL


(140-400) 


 


 580 x10^3/uL


(140-400)


 


Neutrophils (%) (Auto) 77 % (31-73)    68 % (31-73) 


 


Lymphocytes (%) (Auto) 10 % (24-48)    14 % (24-48) 


 


Monocytes (%) (Auto) 10 % (0-9)    14 % (0-9) 


 


Eosinophils (%) (Auto) 3 % (0-3)    4 % (0-3) 


 


Basophils (%) (Auto) 0 % (0-3)    0 % (0-3) 


 


Neutrophils # (Auto)


 7.8 x10^3/uL


(1.8-7.7) 


 


 5.8 x10^3/uL


(1.8-7.7)


 


Lymphocytes # (Auto)


 1.0 x10^3/uL


(1.0-4.8) 


 


 1.2 x10^3/uL


(1.0-4.8)


 


Monocytes # (Auto)


 1.0 x10^3/uL


(0.0-1.1) 


 


 1.2 x10^3/uL


(0.0-1.1)


 


Eosinophils # (Auto)


 0.3 x10^3/uL


(0.0-0.7) 


 


 0.3 x10^3/uL


(0.0-0.7)


 


Basophils # (Auto)


 0.0 x10^3/uL


(0.0-0.2) 


 


 0.0 x10^3/uL


(0.0-0.2)


 


Sodium Level


 135 mmol/L


(136-145) 


 


 136 mmol/L


(136-145)


 


Potassium Level


 4.1 mmol/L


(3.5-5.1) 


 


 3.9 mmol/L


(3.5-5.1)


 


Chloride Level


 102 mmol/L


() 


 


 103 mmol/L


()


 


Carbon Dioxide Level


 30 mmol/L


(21-32) 


 


 29 mmol/L


(21-32)


 


Anion Gap 3 (6-14)    4 (6-14) 


 


Blood Urea Nitrogen


 9 mg/dL (7-20) 


 


 


 10 mg/dL


(7-20)


 


Creatinine


 0.5 mg/dL


(0.6-1.0) 


 


 0.6 mg/dL


(0.6-1.0)


 


Estimated GFR


(Cockcroft-Gault) 131.1 


 


 


 106.3 





 


Glucose Level


 126 mg/dL


(70-99) 


 


 153 mg/dL


(70-99)


 


Calcium Level


 8.7 mg/dL


(8.5-10.1) 


 


 8.7 mg/dL


(8.5-10.1)


 


Glucose (Fingerstick)


 


 141 mg/dL


(70-99) 125 mg/dL


(70-99) 141 mg/dL


(70-99)








Laboratory Tests








Test


 7/15/20


18:41 7/16/20


00:12 7/16/20


06:15


 


Glucose (Fingerstick)


 141 mg/dL


(70-99) 125 mg/dL


(70-99) 141 mg/dL


(70-99)


 


White Blood Count


 


 


 8.5 x10^3/uL


(4.0-11.0)


 


Red Blood Count


 


 


 2.61 x10^6/uL


(3.50-5.40)


 


Hemoglobin


 


 


 7.4 g/dL


(12.0-15.5)


 


Hematocrit


 


 


 22.6 %


(36.0-47.0)


 


Mean Corpuscular Volume   87 fL () 


 


Mean Corpuscular Hemoglobin   29 pg (25-35) 


 


Mean Corpuscular Hemoglobin


Concent 


 


 33 g/dL


(31-37)


 


Red Cell Distribution Width


 


 


 15.1 %


(11.5-14.5)


 


Platelet Count


 


 


 580 x10^3/uL


(140-400)


 


Neutrophils (%) (Auto)   68 % (31-73) 


 


Lymphocytes (%) (Auto)   14 % (24-48) 


 


Monocytes (%) (Auto)   14 % (0-9) 


 


Eosinophils (%) (Auto)   4 % (0-3) 


 


Basophils (%) (Auto)   0 % (0-3) 


 


Neutrophils # (Auto)


 


 


 5.8 x10^3/uL


(1.8-7.7)


 


Lymphocytes # (Auto)


 


 


 1.2 x10^3/uL


(1.0-4.8)


 


Monocytes # (Auto)


 


 


 1.2 x10^3/uL


(0.0-1.1)


 


Eosinophils # (Auto)


 


 


 0.3 x10^3/uL


(0.0-0.7)


 


Basophils # (Auto)


 


 


 0.0 x10^3/uL


(0.0-0.2)


 


Sodium Level


 


 


 136 mmol/L


(136-145)


 


Potassium Level


 


 


 3.9 mmol/L


(3.5-5.1)


 


Chloride Level


 


 


 103 mmol/L


()


 


Carbon Dioxide Level


 


 


 29 mmol/L


(21-32)


 


Anion Gap   4 (6-14) 


 


Blood Urea Nitrogen


 


 


 10 mg/dL


(7-20)


 


Creatinine


 


 


 0.6 mg/dL


(0.6-1.0)


 


Estimated GFR


(Cockcroft-Gault) 


 


 106.3 





 


Glucose Level


 


 


 153 mg/dL


(70-99)


 


Calcium Level


 


 


 8.7 mg/dL


(8.5-10.1)








Medications





Active Scripts








 Medications  Dose


 Route/Sig


 Max Daily Dose Days Date Category


 


 Bisoprolol


 Fumarate 5 Mg


 Tablet  10 Mg


 PO DAILY


   3/16/20 Reported








Comments


ct reviewed 7/12/20, Decreased left-sided effusion after catheter placement. The




right-sided effusion has increased as has atelectasis.


 





 


There has been exchange or placement of multiple drainage 


tubes and a gastrojejunostomy tube. Both collections are smaller. No 


significant new abdominal fluid collection is seen. The jejunal component 


of the gastrojejunostomy tube appears to be looped in the proximal small 


bowel. 











ct abdomen /pelvis 6/6


1. Removal of the percutaneous pigtail drainage catheters since the prior 


exam. Sequela of pancreatitis with extensive pseudocysts again 


demonstrated, the right-sided collections are slightly larger since the 


prior exam, the left-sided collections are stable. See above.


2. Moderate to large left pleural effusion with atelectasis and collapse 


of most of the left lower lobe, stable. Small right pleural effusion is 


stable.


3. Gallstone.





ct chest 6/15 reviewed








 GRAM NEG COCCOBACILLI:MANY


        SQUAMOUS EPI CELL:RARE


        PMN (WBCs):FEW


        Unless otherwise specified, Testing Performed by:


        [address] [address]


        For Inquires, the Physician may contact the Microbiology


        department at [phone]





  RESPIRATORY CULTURE  Final  


        Final





        MANY GRAM NEGATIVE RODS on 06/15/20 at 1106


        FINAL ID= [PSEUDOMONAS AERUGINOSA]


        MICRO CHARGES


        PSEUDOMONAS AERUGINOSA





  ANTIMICROBIAL SUSCEPTIBILITY  Final  


        Comment





        NEG ANSON 56


        PSEUDOMONAS AERUGINOSA


        ANTIBIOTIC                        RESULT          INTERPRETATION


        AMIKACIN                          <=16                  S


        AZTREONAM                         <=4                   S


        CEFTAZIDIME                       <=1                   S


        CIPROFLOXACIN                     <=0.25                S


        CEFEPIME                          <=2                   S


        CEFTAZIDIME/AVIBACTAM             <=4                   S


        GENTAMICIN                        <=2                   S


        LEVOFLOXACIN                      <=0.5                 S





Impression


.





IMPRESSION:


1.  Acute hypoxemic respiratory failure secondary to ARDS status post trach, 

developed anemia 6/7, blood drainage from RLQ abdomen drain site, and 

surrounding firmness  / developed septic shock 6/7 from abdomen source, required

levo 6/7


s/p 3 new drains 6/7 with brown color drainage,  


2.  Gallstone pancreatitis, now with ongoing bleeding from prior drain. Anemic. 

s/p Tx multiple units over several days


3.  septic shock/sepsis, recurrent 6/7, source abdomen. new fever  ? aspiration 

pneumonitis/pneumonia


4.  Acute kidney injury-, Off HD--renal function decling. suspect JED on CKD due

to hypotension , improved now


5.  Acute gallstone pancreatitis.


6.  Hypoalbuminemia.


7.  Moderate persistent effusions, s/p left thora  5/12, reaccumulation of left 

effusion. O2 requirement not changed. 


8.  Fever- ,hypotension. suspect recurrent sepsis/ likely pancreatic source.  

Per ID, per surgery--


9.  Chronic anemia-- ongoing / s/p PRBC


10. Covid 19 testing negative


11. Moderate to large ascites-S/P paracentisis


12.S/P paracentisis with 4 liters removed on 4/15/20


13. S/P IR drain placement on 5/8/2020, removal, re inserted 6/7


14. Depression/Anxiety 


15.,  Fever, per ID


16.  Status post chest tube placement, not much drainage


6/30


S/P Exploratory laparotomy, lysis of adhesions, subtotal cholecystectomy with 

cholangiogram, gastrojejunostomy tube placement, pancreatic necrosectomy


leukocytosis- improving





Plan


.


Continue current support, monitor H&H, afebrile today


Chest tube drained a bit after Cathflo


We will send pleural fluid for analysis, patient now with fever will defer 

further work-up to ID and surgery


Continue trach shield


Up to chair


Follow culture


Follow surgery input


DVT GI prophylaxis


ABX per ID 


f/u BC /resp cultures 


Continue TPN for nutrition support 


DVT/GI PPX


D/W RN and RT,











STEVE MIRANDA MD              Jul 16, 2020 10:24

## 2020-07-16 NOTE — PDOC
SURGICAL PROGRESS NOTE


Subjective


awake


Vital Signs





Vital Signs








  Date Time  Temp Pulse Resp B/P (MAP) Pulse Ox O2 Delivery O2 Flow Rate FiO2


 


7/16/20 09:00  120 30 151/80 (103) 96 Tracheal Collar  


 


7/16/20 08:51       8.0 


 


7/16/20 08:00 99.0       





 99.0       








I&O











Intake and Output 


 


 7/16/20





 07:00


 


Intake Total 2669 ml


 


Output Total 4660 ml


 


Balance -1991 ml


 


 


 


IV Total 2669 ml


 


Output Urine Total 2590 ml


 


Chest Tube Drainage Total 80 ml


 


Drainage Total 1990 ml








PATIENT HAS A VILLASENOR:  Yes


General:  Cooperative, No acute distress


HEENT:  Other (trach)


Abdomen:  Soft, Other (multiple drains)


Labs





Laboratory Tests








Test


 7/14/20


12:34 7/14/20


17:49 7/15/20


00:06 7/15/20


05:52


 


Glucose (Fingerstick)


 148 mg/dL


(70-99) 122 mg/dL


(70-99) 119 mg/dL


(70-99) 116 mg/dL


(70-99)


 


Test


 7/15/20


06:00 7/15/20


18:41 7/16/20


00:12 7/16/20


06:15


 


White Blood Count


 10.1 x10^3/uL


(4.0-11.0) 


 


 8.5 x10^3/uL


(4.0-11.0)


 


Red Blood Count


 2.44 x10^6/uL


(3.50-5.40) 


 


 2.61 x10^6/uL


(3.50-5.40)


 


Hemoglobin


 7.0 g/dL


(12.0-15.5) 


 


 7.4 g/dL


(12.0-15.5)


 


Hematocrit


 21.2 %


(36.0-47.0) 


 


 22.6 %


(36.0-47.0)


 


Mean Corpuscular Volume 87 fL ()    87 fL () 


 


Mean Corpuscular Hemoglobin 29 pg (25-35)    29 pg (25-35) 


 


Mean Corpuscular Hemoglobin


Concent 33 g/dL


(31-37) 


 


 33 g/dL


(31-37)


 


Red Cell Distribution Width


 14.8 %


(11.5-14.5) 


 


 15.1 %


(11.5-14.5)


 


Platelet Count


 551 x10^3/uL


(140-400) 


 


 580 x10^3/uL


(140-400)


 


Neutrophils (%) (Auto) 77 % (31-73)    68 % (31-73) 


 


Lymphocytes (%) (Auto) 10 % (24-48)    14 % (24-48) 


 


Monocytes (%) (Auto) 10 % (0-9)    14 % (0-9) 


 


Eosinophils (%) (Auto) 3 % (0-3)    4 % (0-3) 


 


Basophils (%) (Auto) 0 % (0-3)    0 % (0-3) 


 


Neutrophils # (Auto)


 7.8 x10^3/uL


(1.8-7.7) 


 


 5.8 x10^3/uL


(1.8-7.7)


 


Lymphocytes # (Auto)


 1.0 x10^3/uL


(1.0-4.8) 


 


 1.2 x10^3/uL


(1.0-4.8)


 


Monocytes # (Auto)


 1.0 x10^3/uL


(0.0-1.1) 


 


 1.2 x10^3/uL


(0.0-1.1)


 


Eosinophils # (Auto)


 0.3 x10^3/uL


(0.0-0.7) 


 


 0.3 x10^3/uL


(0.0-0.7)


 


Basophils # (Auto)


 0.0 x10^3/uL


(0.0-0.2) 


 


 0.0 x10^3/uL


(0.0-0.2)


 


Sodium Level


 135 mmol/L


(136-145) 


 


 136 mmol/L


(136-145)


 


Potassium Level


 4.1 mmol/L


(3.5-5.1) 


 


 3.9 mmol/L


(3.5-5.1)


 


Chloride Level


 102 mmol/L


() 


 


 103 mmol/L


()


 


Carbon Dioxide Level


 30 mmol/L


(21-32) 


 


 29 mmol/L


(21-32)


 


Anion Gap 3 (6-14)    4 (6-14) 


 


Blood Urea Nitrogen


 9 mg/dL (7-20) 


 


 


 10 mg/dL


(7-20)


 


Creatinine


 0.5 mg/dL


(0.6-1.0) 


 


 0.6 mg/dL


(0.6-1.0)


 


Estimated GFR


(Cockcroft-Gault) 131.1 


 


 


 106.3 





 


Glucose Level


 126 mg/dL


(70-99) 


 


 153 mg/dL


(70-99)


 


Calcium Level


 8.7 mg/dL


(8.5-10.1) 


 


 8.7 mg/dL


(8.5-10.1)


 


Glucose (Fingerstick)


 


 141 mg/dL


(70-99) 125 mg/dL


(70-99) 141 mg/dL


(70-99)








Laboratory Tests








Test


 7/15/20


18:41 7/16/20


00:12 7/16/20


06:15


 


Glucose (Fingerstick)


 141 mg/dL


(70-99) 125 mg/dL


(70-99) 141 mg/dL


(70-99)


 


White Blood Count


 


 


 8.5 x10^3/uL


(4.0-11.0)


 


Red Blood Count


 


 


 2.61 x10^6/uL


(3.50-5.40)


 


Hemoglobin


 


 


 7.4 g/dL


(12.0-15.5)


 


Hematocrit


 


 


 22.6 %


(36.0-47.0)


 


Mean Corpuscular Volume   87 fL () 


 


Mean Corpuscular Hemoglobin   29 pg (25-35) 


 


Mean Corpuscular Hemoglobin


Concent 


 


 33 g/dL


(31-37)


 


Red Cell Distribution Width


 


 


 15.1 %


(11.5-14.5)


 


Platelet Count


 


 


 580 x10^3/uL


(140-400)


 


Neutrophils (%) (Auto)   68 % (31-73) 


 


Lymphocytes (%) (Auto)   14 % (24-48) 


 


Monocytes (%) (Auto)   14 % (0-9) 


 


Eosinophils (%) (Auto)   4 % (0-3) 


 


Basophils (%) (Auto)   0 % (0-3) 


 


Neutrophils # (Auto)


 


 


 5.8 x10^3/uL


(1.8-7.7)


 


Lymphocytes # (Auto)


 


 


 1.2 x10^3/uL


(1.0-4.8)


 


Monocytes # (Auto)


 


 


 1.2 x10^3/uL


(0.0-1.1)


 


Eosinophils # (Auto)


 


 


 0.3 x10^3/uL


(0.0-0.7)


 


Basophils # (Auto)


 


 


 0.0 x10^3/uL


(0.0-0.2)


 


Sodium Level


 


 


 136 mmol/L


(136-145)


 


Potassium Level


 


 


 3.9 mmol/L


(3.5-5.1)


 


Chloride Level


 


 


 103 mmol/L


()


 


Carbon Dioxide Level


 


 


 29 mmol/L


(21-32)


 


Anion Gap   4 (6-14) 


 


Blood Urea Nitrogen


 


 


 10 mg/dL


(7-20)


 


Creatinine


 


 


 0.6 mg/dL


(0.6-1.0)


 


Estimated GFR


(Cockcroft-Gault) 


 


 106.3 





 


Glucose Level


 


 


 153 mg/dL


(70-99)


 


Calcium Level


 


 


 8.7 mg/dL


(8.5-10.1)








Problem List


Problems


Medical Problems:


(1) Acute pancreatitis


Status: Acute  





(2) Cholelithiasis


Status: Acute  








Assessment/Plan


supportive care


RLQ drain with purulent drainage yesterday --will review with DR Flores





Justicifation of Admission Dx:


Justifications for Admission:


Justification of Admission Dx:  Yes











SAURAV NASH            Jul 16, 2020 09:51

## 2020-07-16 NOTE — NUR
Pharmacy TPN Dosing Note



S: JESENIA BEAN is a 49 year old F Currently receiving Central Continuous TPN started 
03/18/20



B:Pertinent PMH: 

Necrotizing pancreatitis

Height: 5 feet, 8 inches

Weight: 92.325866 kg



Current diet: NPO 



LABS:

Sodium:    136 

Potassium: 3.9 

Chloride:  103 

Calcium:   8.7 

Corrected Calcium: 11.02 

Magnesium: 2.1 

CO2:       29 

SCr:       0.5 

Glucose:   153 

Albumin:   1.1 

AST:       25 

ALT:       37 



TPN FORMULA:

TPN TYPE:  Central Continuous

AMINO ACIDS:         85 gm

DEXTROSE:            250 gm

LIPIDS:              20 gm

SODIUM CHLORIDE:     90 mEq

SODIUM ACETATE:      - mEq

SODIUM PHOSPHATE:    - mmol

POTASSIUM CHLORIDE:  30 mEq

POTASSIUM ACETATE:   30 mEq

POTASSIUM PHOSPHATE: - mmol

MAGNESIUM:           15 mEq

CALCIUM:             - mEq

INSULIN:             15 units

MULTIPLE VITAMIN:    10 ml

TRACE ELEMENTS:      1 ml ml(s)



TPN PLAN:  

restart tf today. cont tpn today.





R: Continue TPN at 70 ml/hr

Will monitor electrolytes, glucose, and tolerance to TPN.



 YENIFER STREETER [facility], 07/16/20 4083

## 2020-07-17 VITALS — DIASTOLIC BLOOD PRESSURE: 81 MMHG | SYSTOLIC BLOOD PRESSURE: 155 MMHG

## 2020-07-17 VITALS — SYSTOLIC BLOOD PRESSURE: 168 MMHG | DIASTOLIC BLOOD PRESSURE: 114 MMHG

## 2020-07-17 VITALS — DIASTOLIC BLOOD PRESSURE: 99 MMHG | SYSTOLIC BLOOD PRESSURE: 177 MMHG

## 2020-07-17 VITALS — SYSTOLIC BLOOD PRESSURE: 151 MMHG | DIASTOLIC BLOOD PRESSURE: 86 MMHG

## 2020-07-17 VITALS — DIASTOLIC BLOOD PRESSURE: 107 MMHG | SYSTOLIC BLOOD PRESSURE: 170 MMHG

## 2020-07-17 VITALS — DIASTOLIC BLOOD PRESSURE: 78 MMHG | SYSTOLIC BLOOD PRESSURE: 137 MMHG

## 2020-07-17 VITALS — SYSTOLIC BLOOD PRESSURE: 170 MMHG | DIASTOLIC BLOOD PRESSURE: 104 MMHG

## 2020-07-17 VITALS — DIASTOLIC BLOOD PRESSURE: 85 MMHG | SYSTOLIC BLOOD PRESSURE: 160 MMHG

## 2020-07-17 VITALS — DIASTOLIC BLOOD PRESSURE: 104 MMHG | SYSTOLIC BLOOD PRESSURE: 172 MMHG

## 2020-07-17 VITALS — SYSTOLIC BLOOD PRESSURE: 151 MMHG | DIASTOLIC BLOOD PRESSURE: 87 MMHG

## 2020-07-17 VITALS — SYSTOLIC BLOOD PRESSURE: 212 MMHG | DIASTOLIC BLOOD PRESSURE: 123 MMHG

## 2020-07-17 VITALS — SYSTOLIC BLOOD PRESSURE: 152 MMHG | DIASTOLIC BLOOD PRESSURE: 97 MMHG

## 2020-07-17 VITALS — SYSTOLIC BLOOD PRESSURE: 152 MMHG | DIASTOLIC BLOOD PRESSURE: 92 MMHG

## 2020-07-17 VITALS — DIASTOLIC BLOOD PRESSURE: 109 MMHG | SYSTOLIC BLOOD PRESSURE: 165 MMHG

## 2020-07-17 VITALS — DIASTOLIC BLOOD PRESSURE: 72 MMHG | SYSTOLIC BLOOD PRESSURE: 136 MMHG

## 2020-07-17 VITALS — SYSTOLIC BLOOD PRESSURE: 187 MMHG | DIASTOLIC BLOOD PRESSURE: 123 MMHG

## 2020-07-17 VITALS — SYSTOLIC BLOOD PRESSURE: 146 MMHG | DIASTOLIC BLOOD PRESSURE: 91 MMHG

## 2020-07-17 VITALS — DIASTOLIC BLOOD PRESSURE: 105 MMHG | SYSTOLIC BLOOD PRESSURE: 161 MMHG

## 2020-07-17 VITALS — SYSTOLIC BLOOD PRESSURE: 161 MMHG | DIASTOLIC BLOOD PRESSURE: 90 MMHG

## 2020-07-17 VITALS — SYSTOLIC BLOOD PRESSURE: 151 MMHG | DIASTOLIC BLOOD PRESSURE: 99 MMHG

## 2020-07-17 VITALS — SYSTOLIC BLOOD PRESSURE: 137 MMHG | DIASTOLIC BLOOD PRESSURE: 77 MMHG

## 2020-07-17 VITALS — DIASTOLIC BLOOD PRESSURE: 84 MMHG | SYSTOLIC BLOOD PRESSURE: 140 MMHG

## 2020-07-17 LAB
ANION GAP SERPL CALC-SCNC: 5 MMOL/L (ref 6–14)
BASOPHILS # BLD AUTO: 0 X10^3/UL (ref 0–0.2)
BASOPHILS NFR BLD: 1 % (ref 0–3)
BUN SERPL-MCNC: 10 MG/DL (ref 7–20)
CALCIUM SERPL-MCNC: 9.1 MG/DL (ref 8.5–10.1)
CHLORIDE SERPL-SCNC: 101 MMOL/L (ref 98–107)
CO2 SERPL-SCNC: 29 MMOL/L (ref 21–32)
CREAT SERPL-MCNC: 0.6 MG/DL (ref 0.6–1)
EOSINOPHIL NFR BLD: 0.3 X10^3/UL (ref 0–0.7)
EOSINOPHIL NFR BLD: 3 % (ref 0–3)
ERYTHROCYTE [DISTWIDTH] IN BLOOD BY AUTOMATED COUNT: 15.2 % (ref 11.5–14.5)
GFR SERPLBLD BASED ON 1.73 SQ M-ARVRAT: 106.3 ML/MIN
GLUCOSE SERPL-MCNC: 137 MG/DL (ref 70–99)
HCT VFR BLD CALC: 21.8 % (ref 36–47)
HGB BLD-MCNC: 7.2 G/DL (ref 12–15.5)
LYMPHOCYTES # BLD: 1.2 X10^3/UL (ref 1–4.8)
LYMPHOCYTES NFR BLD AUTO: 12 % (ref 24–48)
MCH RBC QN AUTO: 29 PG (ref 25–35)
MCHC RBC AUTO-ENTMCNC: 33 G/DL (ref 31–37)
MCV RBC AUTO: 86 FL (ref 79–100)
MONO #: 1.6 X10^3/UL (ref 0–1.1)
MONOCYTES NFR BLD: 16 % (ref 0–9)
NEUT #: 6.9 X10^3/UL (ref 1.8–7.7)
NEUTROPHILS NFR BLD AUTO: 69 % (ref 31–73)
PLATELET # BLD AUTO: 625 X10^3/UL (ref 140–400)
POTASSIUM SERPL-SCNC: 4.1 MMOL/L (ref 3.5–5.1)
RBC # BLD AUTO: 2.53 X10^6/UL (ref 3.5–5.4)
SODIUM SERPL-SCNC: 135 MMOL/L (ref 136–145)
WBC # BLD AUTO: 9.9 X10^3/UL (ref 4–11)

## 2020-07-17 RX ADMIN — PANTOPRAZOLE SODIUM SCH MG: 40 INJECTION, POWDER, FOR SOLUTION INTRAVENOUS at 08:23

## 2020-07-17 RX ADMIN — FENTANYL CITRATE PRN MCG: 50 INJECTION INTRAMUSCULAR; INTRAVENOUS at 00:40

## 2020-07-17 RX ADMIN — MEROPENEM SCH MLS/HR: 500 INJECTION, POWDER, FOR SOLUTION INTRAVENOUS at 00:11

## 2020-07-17 RX ADMIN — CIPROFLOXACIN SCH MLS/HR: 2 INJECTION, SOLUTION INTRAVENOUS at 21:20

## 2020-07-17 RX ADMIN — INSULIN LISPRO SCH UNITS: 100 INJECTION, SOLUTION INTRAVENOUS; SUBCUTANEOUS at 00:00

## 2020-07-17 RX ADMIN — IPRATROPIUM BROMIDE AND ALBUTEROL SULFATE SCH ML: .5; 3 SOLUTION RESPIRATORY (INHALATION) at 00:11

## 2020-07-17 RX ADMIN — MEROPENEM SCH MLS/HR: 500 INJECTION, POWDER, FOR SOLUTION INTRAVENOUS at 12:13

## 2020-07-17 RX ADMIN — IPRATROPIUM BROMIDE AND ALBUTEROL SULFATE SCH ML: .5; 3 SOLUTION RESPIRATORY (INHALATION) at 16:15

## 2020-07-17 RX ADMIN — MEROPENEM SCH MLS/HR: 500 INJECTION, POWDER, FOR SOLUTION INTRAVENOUS at 05:37

## 2020-07-17 RX ADMIN — INSULIN LISPRO SCH UNITS: 100 INJECTION, SOLUTION INTRAVENOUS; SUBCUTANEOUS at 18:00

## 2020-07-17 RX ADMIN — FENTANYL CITRATE PRN MCG: 50 INJECTION INTRAMUSCULAR; INTRAVENOUS at 12:13

## 2020-07-17 RX ADMIN — ACETYLCYSTEINE SCH MG: 200 INHALANT RESPIRATORY (INHALATION) at 07:25

## 2020-07-17 RX ADMIN — FENTANYL CITRATE PRN MCG: 50 INJECTION INTRAMUSCULAR; INTRAVENOUS at 18:08

## 2020-07-17 RX ADMIN — CIPROFLOXACIN SCH MLS/HR: 2 INJECTION, SOLUTION INTRAVENOUS at 08:25

## 2020-07-17 RX ADMIN — INSULIN LISPRO SCH UNITS: 100 INJECTION, SOLUTION INTRAVENOUS; SUBCUTANEOUS at 12:00

## 2020-07-17 RX ADMIN — DEXTROSE SCH MLS/HR: 50 INJECTION, SOLUTION INTRAVENOUS at 08:22

## 2020-07-17 RX ADMIN — DAPTOMYCIN SCH MLS/HR: 500 INJECTION, POWDER, LYOPHILIZED, FOR SOLUTION INTRAVENOUS at 08:23

## 2020-07-17 RX ADMIN — INSULIN LISPRO SCH UNITS: 100 INJECTION, SOLUTION INTRAVENOUS; SUBCUTANEOUS at 05:37

## 2020-07-17 RX ADMIN — IPRATROPIUM BROMIDE AND ALBUTEROL SULFATE SCH ML: .5; 3 SOLUTION RESPIRATORY (INHALATION) at 11:57

## 2020-07-17 RX ADMIN — BACITRACIN SCH MLS/HR: 5000 INJECTION, POWDER, FOR SOLUTION INTRAMUSCULAR at 14:33

## 2020-07-17 RX ADMIN — IPRATROPIUM BROMIDE AND ALBUTEROL SULFATE SCH ML: .5; 3 SOLUTION RESPIRATORY (INHALATION) at 07:28

## 2020-07-17 RX ADMIN — ENOXAPARIN SODIUM SCH MG: 40 INJECTION SUBCUTANEOUS at 08:24

## 2020-07-17 RX ADMIN — IPRATROPIUM BROMIDE AND ALBUTEROL SULFATE SCH ML: .5; 3 SOLUTION RESPIRATORY (INHALATION) at 04:15

## 2020-07-17 RX ADMIN — IPRATROPIUM BROMIDE AND ALBUTEROL SULFATE SCH ML: .5; 3 SOLUTION RESPIRATORY (INHALATION) at 20:12

## 2020-07-17 RX ADMIN — ACETYLCYSTEINE SCH MG: 200 INHALANT RESPIRATORY (INHALATION) at 20:00

## 2020-07-17 RX ADMIN — MEROPENEM SCH MLS/HR: 500 INJECTION, POWDER, FOR SOLUTION INTRAVENOUS at 18:07

## 2020-07-17 RX ADMIN — Medication PRN EACH: at 10:02

## 2020-07-17 NOTE — PDOC
SURGICAL PROGRESS NOTE


Subjective


awake


d/w nursing, ID


Vital Signs





Vital Signs








  Date Time  Temp Pulse Resp B/P (MAP) Pulse Ox O2 Delivery O2 Flow Rate FiO2


 


7/17/20 07:34     98 Tracheal Collar 8.0 


 


7/17/20 06:00  114 26 155/81 (105)    


 


7/17/20 04:00 99.2       





 99.2       








I&O











Intake and Output 


 


 7/17/20





 07:00


 


Intake Total 1310 ml


 


Output Total 3530 ml


 


Balance -2220 ml


 


 


 


IV Total 1310 ml


 


Output Urine Total 1970 ml


 


Chest Tube Drainage Total 40 ml


 


Drainage Total 1520 ml








General:  Alert, Cooperative (place)


Abdomen:  Soft, Other (mulitple drains, lower sump drain migrated out)


Labs





Laboratory Tests








Test


 7/15/20


18:41 7/16/20


00:12 7/16/20


06:15 7/16/20


17:27


 


Glucose (Fingerstick)


 141 mg/dL


(70-99) 125 mg/dL


(70-99) 141 mg/dL


(70-99) 136 mg/dL


(70-99)


 


White Blood Count


 


 


 8.5 x10^3/uL


(4.0-11.0) 





 


Red Blood Count


 


 


 2.61 x10^6/uL


(3.50-5.40) 





 


Hemoglobin


 


 


 7.4 g/dL


(12.0-15.5) 





 


Hematocrit


 


 


 22.6 %


(36.0-47.0) 





 


Mean Corpuscular Volume   87 fL ()  


 


Mean Corpuscular Hemoglobin   29 pg (25-35)  


 


Mean Corpuscular Hemoglobin


Concent 


 


 33 g/dL


(31-37) 





 


Red Cell Distribution Width


 


 


 15.1 %


(11.5-14.5) 





 


Platelet Count


 


 


 580 x10^3/uL


(140-400) 





 


Neutrophils (%) (Auto)   68 % (31-73)  


 


Lymphocytes (%) (Auto)   14 % (24-48)  


 


Monocytes (%) (Auto)   14 % (0-9)  


 


Eosinophils (%) (Auto)   4 % (0-3)  


 


Basophils (%) (Auto)   0 % (0-3)  


 


Neutrophils # (Auto)


 


 


 5.8 x10^3/uL


(1.8-7.7) 





 


Lymphocytes # (Auto)


 


 


 1.2 x10^3/uL


(1.0-4.8) 





 


Monocytes # (Auto)


 


 


 1.2 x10^3/uL


(0.0-1.1) 





 


Eosinophils # (Auto)


 


 


 0.3 x10^3/uL


(0.0-0.7) 





 


Basophils # (Auto)


 


 


 0.0 x10^3/uL


(0.0-0.2) 





 


Sodium Level


 


 


 136 mmol/L


(136-145) 





 


Potassium Level


 


 


 3.9 mmol/L


(3.5-5.1) 





 


Chloride Level


 


 


 103 mmol/L


() 





 


Carbon Dioxide Level


 


 


 29 mmol/L


(21-32) 





 


Anion Gap   4 (6-14)  


 


Blood Urea Nitrogen


 


 


 10 mg/dL


(7-20) 





 


Creatinine


 


 


 0.6 mg/dL


(0.6-1.0) 





 


Estimated GFR


(Cockcroft-Gault) 


 


 106.3 


 





 


Glucose Level


 


 


 153 mg/dL


(70-99) 





 


Calcium Level


 


 


 8.7 mg/dL


(8.5-10.1) 





 


Test


 7/17/20


00:12 7/17/20


05:30 7/17/20


05:36 





 


Glucose (Fingerstick)


 119 mg/dL


(70-99) 


 139 mg/dL


(70-99) 





 


White Blood Count


 


 9.9 x10^3/uL


(4.0-11.0) 


 





 


Red Blood Count


 


 2.53 x10^6/uL


(3.50-5.40) 


 





 


Hemoglobin


 


 7.2 g/dL


(12.0-15.5) 


 





 


Hematocrit


 


 21.8 %


(36.0-47.0) 


 





 


Mean Corpuscular Volume  86 fL ()   


 


Mean Corpuscular Hemoglobin  29 pg (25-35)   


 


Mean Corpuscular Hemoglobin


Concent 


 33 g/dL


(31-37) 


 





 


Red Cell Distribution Width


 


 15.2 %


(11.5-14.5) 


 





 


Platelet Count


 


 625 x10^3/uL


(140-400) 


 





 


Neutrophils (%) (Auto)  69 % (31-73)   


 


Lymphocytes (%) (Auto)  12 % (24-48)   


 


Monocytes (%) (Auto)  16 % (0-9)   


 


Eosinophils (%) (Auto)  3 % (0-3)   


 


Basophils (%) (Auto)  1 % (0-3)   


 


Neutrophils # (Auto)


 


 6.9 x10^3/uL


(1.8-7.7) 


 





 


Lymphocytes # (Auto)


 


 1.2 x10^3/uL


(1.0-4.8) 


 





 


Monocytes # (Auto)


 


 1.6 x10^3/uL


(0.0-1.1) 


 





 


Eosinophils # (Auto)


 


 0.3 x10^3/uL


(0.0-0.7) 


 





 


Basophils # (Auto)


 


 0.0 x10^3/uL


(0.0-0.2) 


 





 


Sodium Level


 


 135 mmol/L


(136-145) 


 





 


Potassium Level


 


 4.1 mmol/L


(3.5-5.1) 


 





 


Chloride Level


 


 101 mmol/L


() 


 





 


Carbon Dioxide Level


 


 29 mmol/L


(21-32) 


 





 


Anion Gap  5 (6-14)   


 


Blood Urea Nitrogen


 


 10 mg/dL


(7-20) 


 





 


Creatinine


 


 0.6 mg/dL


(0.6-1.0) 


 





 


Estimated GFR


(Cockcroft-Gault) 


 106.3 


 


 





 


Glucose Level


 


 137 mg/dL


(70-99) 


 





 


Calcium Level


 


 9.1 mg/dL


(8.5-10.1) 


 











Laboratory Tests








Test


 7/16/20


17:27 7/17/20


00:12 7/17/20


05:30 7/17/20


05:36


 


Glucose (Fingerstick)


 136 mg/dL


(70-99) 119 mg/dL


(70-99) 


 139 mg/dL


(70-99)


 


White Blood Count


 


 


 9.9 x10^3/uL


(4.0-11.0) 





 


Red Blood Count


 


 


 2.53 x10^6/uL


(3.50-5.40) 





 


Hemoglobin


 


 


 7.2 g/dL


(12.0-15.5) 





 


Hematocrit


 


 


 21.8 %


(36.0-47.0) 





 


Mean Corpuscular Volume   86 fL ()  


 


Mean Corpuscular Hemoglobin   29 pg (25-35)  


 


Mean Corpuscular Hemoglobin


Concent 


 


 33 g/dL


(31-37) 





 


Red Cell Distribution Width


 


 


 15.2 %


(11.5-14.5) 





 


Platelet Count


 


 


 625 x10^3/uL


(140-400) 





 


Neutrophils (%) (Auto)   69 % (31-73)  


 


Lymphocytes (%) (Auto)   12 % (24-48)  


 


Monocytes (%) (Auto)   16 % (0-9)  


 


Eosinophils (%) (Auto)   3 % (0-3)  


 


Basophils (%) (Auto)   1 % (0-3)  


 


Neutrophils # (Auto)


 


 


 6.9 x10^3/uL


(1.8-7.7) 





 


Lymphocytes # (Auto)


 


 


 1.2 x10^3/uL


(1.0-4.8) 





 


Monocytes # (Auto)


 


 


 1.6 x10^3/uL


(0.0-1.1) 





 


Eosinophils # (Auto)


 


 


 0.3 x10^3/uL


(0.0-0.7) 





 


Basophils # (Auto)


 


 


 0.0 x10^3/uL


(0.0-0.2) 





 


Sodium Level


 


 


 135 mmol/L


(136-145) 





 


Potassium Level


 


 


 4.1 mmol/L


(3.5-5.1) 





 


Chloride Level


 


 


 101 mmol/L


() 





 


Carbon Dioxide Level


 


 


 29 mmol/L


(21-32) 





 


Anion Gap   5 (6-14)  


 


Blood Urea Nitrogen


 


 


 10 mg/dL


(7-20) 





 


Creatinine


 


 


 0.6 mg/dL


(0.6-1.0) 





 


Estimated GFR


(Cockcroft-Gault) 


 


 106.3 


 





 


Glucose Level


 


 


 137 mg/dL


(70-99) 





 


Calcium Level


 


 


 9.1 mg/dL


(8.5-10.1) 











Problem List


Problems


Medical Problems:


(1) Acute pancreatitis


Status: Acute  





(2) Cholelithiasis


Status: Acute  








Assessment/Plan


start slow TF


will review with Dr Flores regarding drains





Justicifation of Admission Dx:


Justifications for Admission:


Justification of Admission Dx:  Yes











SAURAV NASH APRN            Jul 17, 2020 09:36

## 2020-07-17 NOTE — NUR
Pharmacy TPN Dosing Note



S: JESENIA BEAN is a 49 year old F Currently receiving Central Continuous TPN started 
03/18/20



B:Pertinent PMH: 

Necrotizing pancreatitis

Height: 5 feet, 8 inches

Weight: 92.5 kg



Current diet: NPO 



LABS:

Sodium:    135 

Potassium: 4.1 

Chloride:  101 

Calcium:   9.1 

Corrected Calcium: 11.42 

Magnesium: 2.1 

CO2:       29 

SCr:       0.5 

Glucose:   139 

Albumin:   1.1 

AST:       25 

ALT:       37 



TPN FORMULA:

TPN TYPE:  Central Continuous

AMINO ACIDS:         85 gm

DEXTROSE:            250 gm

LIPIDS:              20 gm

SODIUM CHLORIDE:     110 mEq

SODIUM ACETATE:      - mEq

SODIUM PHOSPHATE:    - mmol

POTASSIUM CHLORIDE:  30 mEq

POTASSIUM ACETATE:   30 mEq

POTASSIUM PHOSPHATE: - mmol

MAGNESIUM:           15 mEq

CALCIUM:             - mEq

INSULIN:             15 units

MULTIPLE VITAMIN:    10 ml

TRACE ELEMENTS:      1 ml ml(s)



TPN PLAN:  

CONT TF AND TPN. UP NACL  MEQ IN TPN.





R:  TPN AT SAME RATE

Will monitor electrolytes, glucose, and tolerance to TPN.



 YENIFER STREETER [facility], 07/17/20 1531

## 2020-07-17 NOTE — PDOC
Infectious Disease Note


Subjective


Subjective


Patient is awake , off ventilator





ROS


ROS


c/o back pain and abd pain





Vital Sign


Vital Signs





Vital Signs








  Date Time  Temp Pulse Resp B/P (MAP) Pulse Ox O2 Delivery O2 Flow Rate FiO2


 


7/17/20 07:34     98 Tracheal Collar 8.0 


 


7/17/20 06:00  114 26 155/81 (105)    


 


7/17/20 04:00 99.2       





 99.2       











Physical Exam


PHYSICAL EXAM


GENERAL: Propped up in bed, awake, weak appearing 


HEENT: Pupils equal, oral cavity dry. NGT out, 


NECK:  Tracheostomy 


LUNGS: Diminished aeration bases,  CT on left 


HEART:  S1, S2, regular, tachy 


ABDOMEN: Sightly less distended, bowel sounds hypoactive, soft, richardson x 2, 3 ROBERT

drains, G-J tube and + wound vac 


: [place] in place 


EXTREMITIES: Generalized edema, no cyanosis. SCDs & Podus boots bilaterally, 


SKIN: warm touch. No signs of rash.  


NEURO: awake, mouthing some words, tracking 


LUE-PICC without signs of complications 


LUE art-line out, mottling about old art-line site is improving. RP palpable, 

cap refill brisk.





Labs


Lab





Laboratory Tests








Test


 7/16/20


17:27 7/17/20


00:12 7/17/20


05:30 7/17/20


05:36


 


Glucose (Fingerstick)


 136 mg/dL


(70-99) 119 mg/dL


(70-99) 


 139 mg/dL


(70-99)


 


White Blood Count


 


 


 9.9 x10^3/uL


(4.0-11.0) 





 


Red Blood Count


 


 


 2.53 x10^6/uL


(3.50-5.40) 





 


Hemoglobin


 


 


 7.2 g/dL


(12.0-15.5) 





 


Hematocrit


 


 


 21.8 %


(36.0-47.0) 





 


Mean Corpuscular Volume   86 fL ()  


 


Mean Corpuscular Hemoglobin   29 pg (25-35)  


 


Mean Corpuscular Hemoglobin


Concent 


 


 33 g/dL


(31-37) 





 


Red Cell Distribution Width


 


 


 15.2 %


(11.5-14.5) 





 


Platelet Count


 


 


 625 x10^3/uL


(140-400) 





 


Neutrophils (%) (Auto)   69 % (31-73)  


 


Lymphocytes (%) (Auto)   12 % (24-48)  


 


Monocytes (%) (Auto)   16 % (0-9)  


 


Eosinophils (%) (Auto)   3 % (0-3)  


 


Basophils (%) (Auto)   1 % (0-3)  


 


Neutrophils # (Auto)


 


 


 6.9 x10^3/uL


(1.8-7.7) 





 


Lymphocytes # (Auto)


 


 


 1.2 x10^3/uL


(1.0-4.8) 





 


Monocytes # (Auto)


 


 


 1.6 x10^3/uL


(0.0-1.1) 





 


Eosinophils # (Auto)


 


 


 0.3 x10^3/uL


(0.0-0.7) 





 


Basophils # (Auto)


 


 


 0.0 x10^3/uL


(0.0-0.2) 





 


Sodium Level


 


 


 135 mmol/L


(136-145) 





 


Potassium Level


 


 


 4.1 mmol/L


(3.5-5.1) 





 


Chloride Level


 


 


 101 mmol/L


() 





 


Carbon Dioxide Level


 


 


 29 mmol/L


(21-32) 





 


Anion Gap   5 (6-14)  


 


Blood Urea Nitrogen


 


 


 10 mg/dL


(7-20) 





 


Creatinine


 


 


 0.6 mg/dL


(0.6-1.0) 





 


Estimated GFR


(Cockcroft-Gault) 


 


 106.3 


 





 


Glucose Level


 


 


 137 mg/dL


(70-99) 





 


Calcium Level


 


 


 9.1 mg/dL


(8.5-10.1) 











Micro





BC neg





Objective


Assessment


Patient with prolonged hospitalization more than 3 months


Multiple medical problems


Multiple surgical procedures





S/P Exp. 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 

parapsilosis & PSAE (I-merrem/ceftazidime/AZT/cefepime))


Leucocytosis -trending upward 


Fever 


Acute gallstone pancreatitis with persistent necrosis


  - 4/9.  CT A/P Increased ascites. Persistent evidence of necrotizing 

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


  - 4/27. status post ROBERT drain placement; C. parapsilosis. s/p drain 5/6 + yeast

& high amylase; s/p additional drain on 5/8. Drains removed. 


  -5/6. fluid  candida parapsilosis fluid, amylase high


  - 6/6 showed multiple pseudocysts, slight larger on the right. s/p drains x 3,

6/7.  + PSAE (MDRO-R Cefepime, Zosyn ANSON < 64) and yeast, 


  -6/7 s/p drain replacement x 3; fluid cult PSAE (MDRO), yeast; treated


  -7/12 CT A/P shows smaller fluid collections.         


Ascites s/p paracentesis 4/15 & 5/6. C. parapsilosis 


Cholelithiasis with thickening of the gallbladder wall.


JED, Hyperkalemia, Metabolic acidosis off dialysis


Acute hypoxic resp failure. trach/vent. sputum 6/13  + PSAE (I merrem) 


Pleural effusions s/p left thoracentesis, 5/12. no culture. s/p left chest tube,

6/15 no growth


Hypocalcemia 


Prediabetes


HTN


Anemia s/p PRBCs





Plan


Plan of Care


Meropenem, cipro , micafungin and dapto


repeat bc


Labs in am


wound care /drain management as directed


Contact isolation for CRE/MDRO


D/w nursing 





Critically ill








 long term prognosis poor











LINN FRANZ MD               Jul 17, 2020 07:47

## 2020-07-17 NOTE — NUR
SS following up with discharge planning. SS reviewed pt chart and discussed with pt RN. No 
new changes as of today. Pt remains on trach collar and TPN. Pt on IV Cipro, Daptomycin, 
Micafungin, and Meropenem. Pt has chest tube and J tube. Pt has ROBERT drains x3 and 2 Avi 
drains. Pt self pay. SS contacted [facility], [facility], and [facility] and was notified that they are not taking self pay pt's at this time. SS will 
continue to follow for discharge planning.

## 2020-07-17 NOTE — PDOC
PROGRESS NOTES


Chief Complaint


Chief Complaint


A/P


Acute hypoxic Respiratory failure required  mechanical ventilation


Tracheostomy


bilateral pleural effusions/pulm edema s/p Throacentesis on 6/15/2020


Severe Acute gallstone pancreatitis (not a surgical candidate at this time) with

necrosis


Acute kidney failure now requiring dialysis


Gallstones (Calculus of gallbladder with acute cholecystitis without 

obstruction)


HTN 


Intractable pain


Intractable nausea


Covid 19 negative. 


Acute on chronic anemia 


EEG: No seizure activity


Fever  - intermittent 


? Ileus with vomiting


Abd distention - U/S and CT reviewed s/p 0.4 L of opaque, debris-containing 

ascites was removed 5/6


Acute pancreatitis with persistent necrosis


Gallstone pancreatitis with necrosis. 


   -CT A/P 6/6 showed multiple pseudocysts, slight larger on the right. s/p 

drains x 3, 6/7.  + PSAE (MDRO-R Cefepime, Zosyn ANSON < 64) and yeast, 


   -s/p drain 4/27. C. parapsilosis. s/p drain 5/6 + yeast & high amylase; s/p 

additional drain on 5/8. Drains removed. 


Ascites s/p paracentesis 4/15 & 5/6. C. parapsilosis 


JED. off HD. 


A large fluid collection in the pancreatic bed has slightly decreased in size, 

described below, the pancreas itself is difficult to  visualize, which could be 

due to necrosis or obscuration of pancreatic  parenchyma from the surrounding 

fluid collection.6/15 


- 4/27 status post ROBERT drain placement + C paropsilosis. s/p additional drains 

5/8


Anemia - S/p PRBCs. 


Cholelithiasis with thickening of the gallbladder wall.


Leucocytosis improving


JED, hyperkalemia, Metabolic acidosis off dialysis


hypocalcemia 


Prediabetes


HTN


s/p trach


Hyperglycemia


severe protein-caloric malnutrition


Moderate to large left pleural effusion with atelectasis and collapse  of most 

of the left lower lobe, stable


 


Dispo - ICU, critically ill


Chest tube draining


TPN per nutrition 


follow cultures. 


meropenam, cipro, micafungin per ID 


Continue trach shield


ID, gen sx, GI, pulm following 


DVT GI prophylaxis


Continue TPN for nutrition support 


poor prognosis


follow labs


xanax for anxiety prn





History of Present Illness


History of Present Illness


secretions this AM. no fevers overnight





Vitals


Vitals





Vital Signs








  Date Time  Temp Pulse Resp B/P (MAP) Pulse Ox O2 Delivery O2 Flow Rate FiO2


 


7/17/20 10:00  120 30 152/97 (115) 99 Tracheal Collar  


 


7/17/20 08:00       8.0 


 


7/17/20 08:00 98.5       





 98.5       











Physical Exam


Physical Exam


GENERAL: Propped up in bed, awake, weak appearing 


HEENT: Pupils equal, oral cavity dry. NGT out, 


NECK:  Tracheostomy 


LUNGS: Diminished aeration bases,  CT on left 


HEART:  S1, S2, regular, tachy 


ABDOMEN: Sightly less distended, bowel sounds hypoactive, soft, richardson x 2, 3 ROBERT

drains, G-J tube and + wound vac 


: [place] in place 


EXTREMITIES: Generalized edema, no cyanosis. SCDs & Podus boots bilaterally, 


SKIN: warm touch. No signs of rash.  


NEURO: awake, mouthing some words, tracking 


LUE-PICC without signs of complications 


LUE art-line out, mottling about old art-line site is improving. RP palpable, 

cap refill brisk.


General:  Alert, Cooperative (place)


Heart:  Regular rate (SR/ST), Other (distant heart sounds)


Lungs:  Crackles


Abdomen:  Soft, Other (mulitple drains, lower sump drain migrated out)


Extremities:  Other (Diffuse edema)


Skin:  No rashes, No significant lesion





Labs


LABS





Laboratory Tests








Test


 7/16/20


17:27 7/17/20


00:12 7/17/20


05:30 7/17/20


05:36


 


Glucose (Fingerstick)


 136 mg/dL


(70-99) 119 mg/dL


(70-99) 


 139 mg/dL


(70-99)


 


White Blood Count


 


 


 9.9 x10^3/uL


(4.0-11.0) 





 


Red Blood Count


 


 


 2.53 x10^6/uL


(3.50-5.40) 





 


Hemoglobin


 


 


 7.2 g/dL


(12.0-15.5) 





 


Hematocrit


 


 


 21.8 %


(36.0-47.0) 





 


Mean Corpuscular Volume   86 fL ()  


 


Mean Corpuscular Hemoglobin   29 pg (25-35)  


 


Mean Corpuscular Hemoglobin


Concent 


 


 33 g/dL


(31-37) 





 


Red Cell Distribution Width


 


 


 15.2 %


(11.5-14.5) 





 


Platelet Count


 


 


 625 x10^3/uL


(140-400) 





 


Neutrophils (%) (Auto)   69 % (31-73)  


 


Lymphocytes (%) (Auto)   12 % (24-48)  


 


Monocytes (%) (Auto)   16 % (0-9)  


 


Eosinophils (%) (Auto)   3 % (0-3)  


 


Basophils (%) (Auto)   1 % (0-3)  


 


Neutrophils # (Auto)


 


 


 6.9 x10^3/uL


(1.8-7.7) 





 


Lymphocytes # (Auto)


 


 


 1.2 x10^3/uL


(1.0-4.8) 





 


Monocytes # (Auto)


 


 


 1.6 x10^3/uL


(0.0-1.1) 





 


Eosinophils # (Auto)


 


 


 0.3 x10^3/uL


(0.0-0.7) 





 


Basophils # (Auto)


 


 


 0.0 x10^3/uL


(0.0-0.2) 





 


Sodium Level


 


 


 135 mmol/L


(136-145) 





 


Potassium Level


 


 


 4.1 mmol/L


(3.5-5.1) 





 


Chloride Level


 


 


 101 mmol/L


() 





 


Carbon Dioxide Level


 


 


 29 mmol/L


(21-32) 





 


Anion Gap   5 (6-14)  


 


Blood Urea Nitrogen


 


 


 10 mg/dL


(7-20) 





 


Creatinine


 


 


 0.6 mg/dL


(0.6-1.0) 





 


Estimated GFR


(Cockcroft-Gault) 


 


 106.3 


 





 


Glucose Level


 


 


 137 mg/dL


(70-99) 





 


Calcium Level


 


 


 9.1 mg/dL


(8.5-10.1) 














Assessment and Plan


Assessmemt and Plan


Problems


Medical Problems:


(1) Acute pancreatitis


Status: Acute  





(2) Cholelithiasis


Status: Acute  











Comment


Review of Relevant


I have reviewed the following items josy (where applicable) has been applied.


Labs





Laboratory Tests








Test


 7/15/20


18:41 7/16/20


00:12 7/16/20


06:15 7/16/20


17:27


 


Glucose (Fingerstick)


 141 mg/dL


(70-99) 125 mg/dL


(70-99) 141 mg/dL


(70-99) 136 mg/dL


(70-99)


 


White Blood Count


 


 


 8.5 x10^3/uL


(4.0-11.0) 





 


Red Blood Count


 


 


 2.61 x10^6/uL


(3.50-5.40) 





 


Hemoglobin


 


 


 7.4 g/dL


(12.0-15.5) 





 


Hematocrit


 


 


 22.6 %


(36.0-47.0) 





 


Mean Corpuscular Volume   87 fL ()  


 


Mean Corpuscular Hemoglobin   29 pg (25-35)  


 


Mean Corpuscular Hemoglobin


Concent 


 


 33 g/dL


(31-37) 





 


Red Cell Distribution Width


 


 


 15.1 %


(11.5-14.5) 





 


Platelet Count


 


 


 580 x10^3/uL


(140-400) 





 


Neutrophils (%) (Auto)   68 % (31-73)  


 


Lymphocytes (%) (Auto)   14 % (24-48)  


 


Monocytes (%) (Auto)   14 % (0-9)  


 


Eosinophils (%) (Auto)   4 % (0-3)  


 


Basophils (%) (Auto)   0 % (0-3)  


 


Neutrophils # (Auto)


 


 


 5.8 x10^3/uL


(1.8-7.7) 





 


Lymphocytes # (Auto)


 


 


 1.2 x10^3/uL


(1.0-4.8) 





 


Monocytes # (Auto)


 


 


 1.2 x10^3/uL


(0.0-1.1) 





 


Eosinophils # (Auto)


 


 


 0.3 x10^3/uL


(0.0-0.7) 





 


Basophils # (Auto)


 


 


 0.0 x10^3/uL


(0.0-0.2) 





 


Sodium Level


 


 


 136 mmol/L


(136-145) 





 


Potassium Level


 


 


 3.9 mmol/L


(3.5-5.1) 





 


Chloride Level


 


 


 103 mmol/L


() 





 


Carbon Dioxide Level


 


 


 29 mmol/L


(21-32) 





 


Anion Gap   4 (6-14)  


 


Blood Urea Nitrogen


 


 


 10 mg/dL


(7-20) 





 


Creatinine


 


 


 0.6 mg/dL


(0.6-1.0) 





 


Estimated GFR


(Cockcroft-Gault) 


 


 106.3 


 





 


Glucose Level


 


 


 153 mg/dL


(70-99) 





 


Calcium Level


 


 


 8.7 mg/dL


(8.5-10.1) 





 


Test


 7/17/20


00:12 7/17/20


05:30 7/17/20


05:36 





 


Glucose (Fingerstick)


 119 mg/dL


(70-99) 


 139 mg/dL


(70-99) 





 


White Blood Count


 


 9.9 x10^3/uL


(4.0-11.0) 


 





 


Red Blood Count


 


 2.53 x10^6/uL


(3.50-5.40) 


 





 


Hemoglobin


 


 7.2 g/dL


(12.0-15.5) 


 





 


Hematocrit


 


 21.8 %


(36.0-47.0) 


 





 


Mean Corpuscular Volume  86 fL ()   


 


Mean Corpuscular Hemoglobin  29 pg (25-35)   


 


Mean Corpuscular Hemoglobin


Concent 


 33 g/dL


(31-37) 


 





 


Red Cell Distribution Width


 


 15.2 %


(11.5-14.5) 


 





 


Platelet Count


 


 625 x10^3/uL


(140-400) 


 





 


Neutrophils (%) (Auto)  69 % (31-73)   


 


Lymphocytes (%) (Auto)  12 % (24-48)   


 


Monocytes (%) (Auto)  16 % (0-9)   


 


Eosinophils (%) (Auto)  3 % (0-3)   


 


Basophils (%) (Auto)  1 % (0-3)   


 


Neutrophils # (Auto)


 


 6.9 x10^3/uL


(1.8-7.7) 


 





 


Lymphocytes # (Auto)


 


 1.2 x10^3/uL


(1.0-4.8) 


 





 


Monocytes # (Auto)


 


 1.6 x10^3/uL


(0.0-1.1) 


 





 


Eosinophils # (Auto)


 


 0.3 x10^3/uL


(0.0-0.7) 


 





 


Basophils # (Auto)


 


 0.0 x10^3/uL


(0.0-0.2) 


 





 


Sodium Level


 


 135 mmol/L


(136-145) 


 





 


Potassium Level


 


 4.1 mmol/L


(3.5-5.1) 


 





 


Chloride Level


 


 101 mmol/L


() 


 





 


Carbon Dioxide Level


 


 29 mmol/L


(21-32) 


 





 


Anion Gap  5 (6-14)   


 


Blood Urea Nitrogen


 


 10 mg/dL


(7-20) 


 





 


Creatinine


 


 0.6 mg/dL


(0.6-1.0) 


 





 


Estimated GFR


(Cockcroft-Gault) 


 106.3 


 


 





 


Glucose Level


 


 137 mg/dL


(70-99) 


 





 


Calcium Level


 


 9.1 mg/dL


(8.5-10.1) 


 











Laboratory Tests








Test


 7/16/20


17:27 7/17/20


00:12 7/17/20


05:30 7/17/20


05:36


 


Glucose (Fingerstick)


 136 mg/dL


(70-99) 119 mg/dL


(70-99) 


 139 mg/dL


(70-99)


 


White Blood Count


 


 


 9.9 x10^3/uL


(4.0-11.0) 





 


Red Blood Count


 


 


 2.53 x10^6/uL


(3.50-5.40) 





 


Hemoglobin


 


 


 7.2 g/dL


(12.0-15.5) 





 


Hematocrit


 


 


 21.8 %


(36.0-47.0) 





 


Mean Corpuscular Volume   86 fL ()  


 


Mean Corpuscular Hemoglobin   29 pg (25-35)  


 


Mean Corpuscular Hemoglobin


Concent 


 


 33 g/dL


(31-37) 





 


Red Cell Distribution Width


 


 


 15.2 %


(11.5-14.5) 





 


Platelet Count


 


 


 625 x10^3/uL


(140-400) 





 


Neutrophils (%) (Auto)   69 % (31-73)  


 


Lymphocytes (%) (Auto)   12 % (24-48)  


 


Monocytes (%) (Auto)   16 % (0-9)  


 


Eosinophils (%) (Auto)   3 % (0-3)  


 


Basophils (%) (Auto)   1 % (0-3)  


 


Neutrophils # (Auto)


 


 


 6.9 x10^3/uL


(1.8-7.7) 





 


Lymphocytes # (Auto)


 


 


 1.2 x10^3/uL


(1.0-4.8) 





 


Monocytes # (Auto)


 


 


 1.6 x10^3/uL


(0.0-1.1) 





 


Eosinophils # (Auto)


 


 


 0.3 x10^3/uL


(0.0-0.7) 





 


Basophils # (Auto)


 


 


 0.0 x10^3/uL


(0.0-0.2) 





 


Sodium Level


 


 


 135 mmol/L


(136-145) 





 


Potassium Level


 


 


 4.1 mmol/L


(3.5-5.1) 





 


Chloride Level


 


 


 101 mmol/L


() 





 


Carbon Dioxide Level


 


 


 29 mmol/L


(21-32) 





 


Anion Gap   5 (6-14)  


 


Blood Urea Nitrogen


 


 


 10 mg/dL


(7-20) 





 


Creatinine


 


 


 0.6 mg/dL


(0.6-1.0) 





 


Estimated GFR


(Cockcroft-Gault) 


 


 106.3 


 





 


Glucose Level


 


 


 137 mg/dL


(70-99) 





 


Calcium Level


 


 


 9.1 mg/dL


(8.5-10.1) 











Microbiology


7/15/20 Blood Culture - Preliminary, Resulted


          NO GROWTH AFTER 1 DAY


7/12/20 Gram Stain Evaluation - Final, Complete


          


7/12/20 Respiratory Culture - Final, Complete


          


7/12/20 Antimicrobic Susceptibility - Final, Complete


          


6/30/20 Gram Stain - Final, Complete


          


6/30/20 Aerobic and Anaerobic Culture - Final, Complete


          


6/30/20 Antimicrobic Susceptibility - Final, Complete


          


6/15/20 Gram Stain - Final, Complete


          


6/15/20 Aerobic and Anaerobic Culture - Final, Complete


          


6/7/20 Urine Culture - Final, Complete


         


5/30/20 Gram Stain - Final, Complete


          


5/30/20 Aerobic Culture - Final, Complete


Medications





Current Medications


Sodium Chloride 1,000 ml @  1,000 mls/hr Q1H IV  Last administered on 3/16/20at 

03:00;  Start 3/16/20 at 03:00;  Stop 3/16/20 at 03:59;  Status DC


Ondansetron HCl (Zofran) 4 mg 1X  ONCE IVP  Last administered on 3/16/20at 

03:27;  Start 3/16/20 at 03:00;  Stop 3/16/20 at 03:01;  Status DC


Morphine Sulfate (Morphine Sulfate) 4 mg 1X  ONCE IV ;  Start 3/16/20 at 03:00; 

Stop 3/16/20 at 03:01;  Status Cancel


Ketorolac Tromethamine (Toradol 30mg Vial) 30 mg 1X  ONCE IV  Last administered 

on 3/16/20at 02:54;  Start 3/16/20 at 03:00;  Stop 3/16/20 at 03:01;  Status DC


Fentanyl Citrate (Fentanyl 2ml Vial) 25 mcg 1X  ONCE IVP  Last administered on 

3/16/20at 03:23;  Start 3/16/20 at 03:30;  Stop 3/16/20 at 03:31;  Status DC


Fentanyl Citrate (Fentanyl 2ml Vial) 100 mcg STK-MED ONCE .ROUTE ;  Start 

3/16/20 at 03:18;  Stop 3/16/20 at 03:18;  Status DC


Iohexol (Omnipaque 350 Mg/ml) 90 ml 1X  ONCE IV  Last administered on 3/16/20at 

03:25;  Start 3/16/20 at 03:30;  Stop 3/16/20 at 03:31;  Status DC


Info (CONTRAST GIVEN -- Rx MONITORING) 1 each PRN DAILY  PRN MC SEE COMMENTS;  

Start 3/16/20 at 03:30;  Stop 3/18/20 at 03:29;  Status DC


Hydromorphone HCl (Dilaudid) 0.5 mg 1X  ONCE IV  Last administered on 3/16/20at 

03:55;  Start 3/16/20 at 04:30;  Stop 3/16/20 at 04:32;  Status DC


Ondansetron HCl (Zofran) 4 mg PRN Q8HRS  PRN IV NAUSEA/VOMITING 1ST CHOICE;  

Start 3/16/20 at 05:00;  Stop 3/16/20 at 09:27;  Status DC


Morphine Sulfate (Morphine Sulfate) 2 mg PRN Q2HR  PRN IV SEVERE PAIN 7-10 Last 

administered on 3/17/20at 12:26;  Start 3/16/20 at 05:00;  Stop 3/17/20 at 

14:15;  Status DC


Sodium Chloride 1,000 ml @  125 mls/hr Q8H IV  Last administered on 3/16/20at 

20:56;  Start 3/16/20 at 05:00;  Stop 3/17/20 at 04:59;  Status DC


Hydromorphone HCl (Dilaudid) 0.5 mg PRN Q3HRS  PRN IV SEVERE PAIN 7-10 Last 

administered on 3/17/20at 10:06;  Start 3/16/20 at 05:00;  Stop 3/17/20 at 

12:01;  Status DC


Piperacillin Sod/ Tazobactam Sod 4.5 gm/Sodium Chloride 100 ml @  200 mls/hr 1X 

ONCE IV  Last administered on 3/16/20at 05:44;  Start 3/16/20 at 06:00;  Stop 

3/16/20 at 06:29;  Status DC


Ondansetron HCl (Zofran) 4 mg PRN Q4HRS  PRN IV NAUSEA/VOMITING 1ST CHOICE Last 

administered on 7/16/20at 08:18;  Start 3/16/20 at 09:30


Insulin Human Lispro (HumaLOG) 0-9 UNITS Q6HRS SQ  Last administered on 7/12/20

at 12:43;  Start 3/16/20 at 09:30


Dextrose (Dextrose 50%-Water Syringe) 12.5 gm PRN Q15MIN  PRN IV SEE COMMENTS;  

Start 3/16/20 at 09:30


Pantoprazole Sodium (PROTONIX VIAL for IV PUSH) 40 mg DAILYAC IVP  Last 

administered on 7/17/20at 08:23;  Start 3/16/20 at 11:30


Prochlorperazine Edisylate (Compazine) 10 mg PRN Q6HRS  PRN IV NAUSEA/VOMITING, 

2nd CHOICE Last administered on 7/13/20at 20:17;  Start 3/16/20 at 17:45


Atenolol (Tenormin) 100 mg DAILY PO ;  Start 3/17/20 at 09:00;  Stop 3/16/20 at 

20:08;  Status DC


Metoprolol Tartrate (Lopressor Vial) 2.5 mg Q6HRS IVP  Last administered on 

3/17/20at 05:51;  Start 3/16/20 at 20:15;  Stop 3/17/20 at 10:02;  Status DC


Metoprolol Tartrate (Lopressor Vial) 5 mg Q6HRS IVP  Last administered on 

3/26/20at 00:12;  Start 3/17/20 at 10:15;  Stop 3/28/20 at 08:48;  Status DC


Hydromorphone HCl (Dilaudid) 1 mg PRN Q3HRS  PRN IV SEVERE PAIN 7-10 Last a

dministered on 3/23/20at 05:13;  Start 3/17/20 at 12:00;  Stop 3/31/20 at 00:25;

 Status DC


Lidocaine HCl (Buffered Lidocaine 1%) 3 ml STK-MED ONCE .ROUTE ;  Start 3/17/20 

at 12:55;  Stop 3/17/20 at 12:56;  Status DC


Albumin Human 500 ml @  125 mls/hr 1X  ONCE IV  Last administered on 3/17/20at 

14:33;  Start 3/17/20 at 14:30;  Stop 3/17/20 at 18:32;  Status DC


Norepinephrine Bitartrate 8 mg/ Dextrose 258 ml @  17.299 mls/ hr CONT  PRN IV 

PER PROTOCOL Last administered on 4/14/20at 12:48;  Start 3/17/20 at 15:30;  

Stop 4/17/20 at 09:19;  Status DC


Sodium Chloride 1,000 ml @  125 mls/hr Q8H IV  Last administered on 3/17/20at 

21:04;  Start 3/17/20 at 16:00;  Stop 3/18/20 at 02:42;  Status DC


Albumin Human 500 ml @  125 mls/hr PRN BID  PRN IV After every 2L NSS & BP < 

90mm Last administered on 6/30/20at 16:06;  Start 3/17/20 at 16:00;  Stop 7/3/20

at 09:30;  Status DC


Iohexol (Omnipaque 300 Mg/ml) 60 ml 1X  ONCE IV  Last administered on 3/17/20at 

17:20;  Start 3/17/20 at 17:00;  Stop 3/17/20 at 17:01;  Status DC


Info (CONTRAST GIVEN -- Rx MONITORING) 1 each PRN DAILY  PRN MC SEE COMMENTS;  

Start 3/17/20 at 17:00;  Stop 3/19/20 at 16:59;  Status DC


Meropenem 1 gm/ Sodium Chloride 100 ml @  200 mls/hr Q8HRS IV  Last administered

on 3/18/20at 05:45;  Start 3/17/20 at 20:00;  Stop 3/18/20 at 08:48;  Status DC


Furosemide (Lasix) 40 mg 1X  ONCE IVP  Last administered on 3/17/20at 22:12;  

Start 3/17/20 at 22:30;  Stop 3/17/20 at 22:31;  Status DC


Calcium Chloride 1000 mg/Sodium Chloride 110 ml @  220 mls/hr 1X  ONCE IV  Last 

administered on 3/17/20at 22:11;  Start 3/17/20 at 22:30;  Stop 3/17/20 at 

22:59;  Status DC


Albuterol Sulfate (Ventolin Neb Soln) 2.5 mg 1X  ONCE NEB  Last administered on 

3/18/20at 00:56;  Start 3/17/20 at 22:30;  Stop 3/17/20 at 22:31;  Status DC


Insulin Human Regular (HumuLIN R VIAL) 5 unit 1X  ONCE IV  Last administered on 

3/17/20at 22:14;  Start 3/17/20 at 22:30;  Stop 3/17/20 at 22:31;  Status DC


Magnesium Sulfate 50 ml @ 25 mls/hr 1X  ONCE IV  Last administered on 3/18/20at 

02:57;  Start 3/18/20 at 03:00;  Stop 3/18/20 at 04:59;  Status DC


Calcium Gluconate 1000 mg/Sodium Chloride 110 ml @  220 mls/hr 1X  ONCE IV  Last

administered on 3/18/20at 02:46;  Start 3/18/20 at 03:00;  Stop 3/18/20 at 

03:29;  Status DC


Sodium Chloride 1,000 ml @  200 mls/hr Q5H IV  Last administered on 3/18/20at 

02:46;  Start 3/18/20 at 03:00;  Stop 3/18/20 at 10:21;  Status DC


Calcium Gluconate 1000 mg/Sodium Chloride 110 ml @  220 mls/hr 1X  ONCE IV  Last

administered on 3/18/20at 03:21;  Start 3/18/20 at 03:30;  Stop 3/18/20 at 

03:59;  Status DC


Sodium Bicarbonate 50 meq/Sodium Chloride 1,050 ml @  75 mls/hr Q14H IV  Last 

administered on 3/22/20at 21:10;  Start 3/18/20 at 07:30;  Stop 3/23/20 at 

10:28;  Status DC


Calcium Gluconate 2000 mg/Sodium Chloride 120 ml @  220 mls/hr 1X  ONCE IV  Last

administered on 3/18/20at 09:05;  Start 3/18/20 at 07:30;  Stop 3/18/20 at 

08:02;  Status DC


Lidocaine HCl (Xylocaine-Mpf 1% 2ml Vial) 2 ml STK-MED ONCE .ROUTE ;  Start 

3/18/20 at 08:47;  Stop 3/18/20 at 08:47;  Status DC


Meropenem 500 mg/ Sodium Chloride 50 ml @  100 mls/hr Q12HR IV  Last 

administered on 3/23/20at 21:01;  Start 3/18/20 at 18:00;  Stop 3/24/20 at 

07:58;  Status DC


Lidocaine HCl (Buffered Lidocaine 1%) 3 ml STK-MED ONCE .ROUTE ;  Start 3/18/20 

at 09:46;  Stop 3/18/20 at 09:46;  Status DC


Lidocaine HCl (Buffered Lidocaine 1%) 6 ml 1X  ONCE INJ  Last administered on 

3/18/20at 10:26;  Start 3/18/20 at 10:15;  Stop 3/18/20 at 10:16;  Status DC


Info (Tpn Per Pharmacy) 1 each PRN DAILY  PRN MC SEE COMMENTS Last administered 

on 7/17/20at 10:02;  Start 3/18/20 at 12:00


Sodium Chloride 1,000 ml @  1,000 mls/hr Q1H PRN IV hypotension;  Start 3/18/20 

at 12:07;  Stop 3/18/20 at 18:06;  Status DC


Diphenhydramine HCl (Benadryl) 25 mg 1X PRN  PRN IV ITCHING;  Start 3/18/20 at 

12:15;  Stop 3/19/20 at 12:14;  Status DC


Diphenhydramine HCl (Benadryl) 25 mg 1X PRN  PRN IV ITCHING;  Start 3/18/20 at 

12:15;  Stop 3/19/20 at 12:14;  Status DC


Sodium Chloride 1,000 ml @  400 mls/hr Q2H30M PRN IV PATENCY;  Start 3/18/20 at 

12:07;  Stop 3/19/20 at 00:06;  Status DC


Info (PHARMACY MONITORING -- do not chart) 1 each PRN DAILY  PRN MC SEE 

COMMENTS;  Start 3/18/20 at 12:15;  Stop 3/20/20 at 08:13;  Status DC


Sodium Chloride 90 meq/Calcium Gluconate 10 meq/ Multivitamins 10 ml/Chromium/ 

Copper/Manganese/ Seleni/Zn 1 ml/ Total Parenteral Nutrition/Amino 

Acids/Dextrose/ Fat Emulsion Intravenous 55.005 ml  @ 2.292 mls/hr TPN  CONT IV 

;  Start 3/18/20 at 22:00;  Stop 3/18/20 at 12:33;  Status DC


Info (Tpn Per Pharmacy) 1 each PRN DAILY  PRN MC SEE COMMENTS;  Start 3/18/20 at

12:30;  Status UNV


Sodium Chloride 90 meq/Calcium Gluconate 10 meq/ Multivitamins 10 ml/Chromium/ 

Copper/Manganese/ Seleni/Zn 0.5 ml/ Total Parenteral Nutrition/Amino 

Acids/Dextrose/ Fat Emulsion Intravenous 1,512 ml @  63 mls/hr TPN  CONT IV  

Last administered on 3/18/20at 22:06;  Start 3/18/20 at 22:00;  Stop 3/19/20 at 

21:59;  Status DC


Calcium Carbonate/ Glycine (Tums) 500 mg PRN AFTMEALHC  PRN PO INDIGESTION;  

Start 3/18/20 at 17:45;  Stop 5/13/20 at 10:25;  Status DC


Calcium Gluconate (Calcium Gluconate) 2,000 mg 1X  ONCE IVP  Last administered 

on 3/19/20at 02:19;  Start 3/19/20 at 02:15;  Stop 3/19/20 at 02:16;  Status DC


Calcium Chloride 3000 mg/Sodium Chloride 1,030 ml @  50 mls/hr [id number] IV  Last 

administered on 3/21/20at 02:17;  Start 3/19/20 at 08:00;  Stop 3/21/20 at 

15:23;  Status DC


Lorazepam (Ativan Inj) 1 mg PRN Q4HRS  PRN IVP ANXIETY / AGITATION, 2nd choic 

Last administered on 4/17/20at 03:51;  Start 3/19/20 at 09:00;  Stop 4/17/20 at 

09:19;  Status DC


Sodium Chloride 1,000 ml @  1,000 mls/hr Q1H PRN IV hypotension;  Start 3/19/20 

at 08:56;  Stop 3/19/20 at 14:55;  Status DC


Albumin Human 200 ml @  200 mls/hr 1X PRN  PRN IV Hypotension;  Start 3/19/20 at

09:00;  Stop 3/19/20 at 14:59;  Status DC


Diphenhydramine HCl (Benadryl) 25 mg 1X PRN  PRN IV ITCHING;  Start 3/19/20 at 

09:00;  Stop 3/20/20 at 08:59;  Status DC


Diphenhydramine HCl (Benadryl) 25 mg 1X PRN  PRN IV ITCHING;  Start 3/19/20 at 

09:00;  Stop 3/20/20 at 08:59;  Status DC


Sodium Chloride 1,000 ml @  400 mls/hr Q2H30M PRN IV PATENCY;  Start 3/19/20 at 

08:56;  Stop 3/19/20 at 20:55;  Status DC


Info (PHARMACY MONITORING -- do not chart) 1 each PRN DAILY  PRN MC SEE 

COMMENTS;  Start 3/19/20 at 09:00;  Status UNV


Info (PHARMACY MONITORING -- do not chart) 1 each PRN DAILY  PRN MC SEE 

COMMENTS;  Start 3/19/20 at 09:00;  Stop 3/20/20 at 08:13;  Status DC


Digoxin (Lanoxin) 500 mcg 1X  ONCE IV  Last administered on 3/19/20at 10:04;  

Start 3/19/20 at 10:00;  Stop 3/19/20 at 10:01;  Status DC


Digoxin (Lanoxin) 125 mcg 1X  ONCE IV  Last administered on 3/19/20at 17:10;  

Start 3/19/20 at 18:00;  Stop 3/19/20 at 18:01;  Status DC


Magnesium Sulfate 100 ml @  25 mls/hr 1X  ONCE IV  Last administered on 

3/19/20at 12:48;  Start 3/19/20 at 13:00;  Stop 3/19/20 at 16:59;  Status DC


Sodium Chloride 90 meq/Magnesium Sulfate 10 meq/ Calcium Gluconate 20 meq/ 

Multivitamins 10 ml/Chromium/ Copper/Manganese/ Seleni/Zn 0.5 ml/ Total 

Parenteral Nutrition/Amino Acids/Dextrose/ Fat Emulsion Intravenous 1,512 ml @  

63 mls/hr TPN  CONT IV  Last administered on 3/19/20at 22:25;  Start 3/19/20 at 

22:00;  Stop 3/20/20 at 21:59;  Status DC


Sodium Chloride 1,000 ml @  1,000 mls/hr Q1H PRN IV hypotension;  Start 3/20/20 

at 08:05;  Stop 3/20/20 at 14:04;  Status DC


Albumin Human 200 ml @  200 mls/hr 1X  ONCE IV  Last administered on 3/20/20at 

08:57;  Start 3/20/20 at 08:15;  Stop 3/20/20 at 09:14;  Status DC


Diphenhydramine HCl (Benadryl) 25 mg 1X PRN  PRN IV ITCHING;  Start 3/20/20 at 

08:15;  Stop 3/21/20 at 08:14;  Status DC


Diphenhydramine HCl (Benadryl) 25 mg 1X PRN  PRN IV ITCHING;  Start 3/20/20 at 

08:15;  Stop 3/21/20 at 08:14;  Status DC


Sodium Chloride 1,000 ml @  400 mls/hr Q2H30M PRN IV PATENCY;  Start 3/20/20 at 

08:05;  Stop 3/20/20 at 20:04;  Status DC


Info (PHARMACY MONITORING -- do not chart) 1 each PRN DAILY  PRN MC SEE 

COMMENTS;  Start 3/20/20 at 08:15;  Stop 3/24/20 at 07:57;  Status DC


Sodium Chloride 90 meq/Potassium Chloride 15 meq/ Potassium Phosphate 10 mmol/ 

Magnesium Sulfate 10 meq/Calcium Gluconate 20 meq/ Multivitamins 10 ml/Chromium/

Copper/Manganese/ Seleni/Zn 0.5 ml/ Total Parenteral Nutrition/Amino 

Acids/Dextrose/ Fat Emulsion Intravenous 1,512 ml @  63 mls/hr TPN  CONT IV  

Last administered on 3/20/20at 21:01;  Start 3/20/20 at 22:00;  Stop 3/21/20 at 

21:59;  Status DC


Potassium Chloride/Water 100 ml @  100 mls/hr 1X  ONCE IV  Last administered on 

3/20/20at 14:09;  Start 3/20/20 at 14:00;  Stop 3/20/20 at 14:59;  Status DC


Benzocaine (Hurricaine One) 1 spray 1X  ONCE MM  Last administered on 3/20/20at 

16:38;  Start 3/20/20 at 14:30;  Stop 3/20/20 at 14:31;  Status DC


Lidocaine HCl (Glydo (Lidocaine) Jelly) 1 thomas 1X  ONCE MM  Last administered on 

3/20/20at 16:38;  Start 3/20/20 at 14:30;  Stop 3/20/20 at 14:31;  Status DC


Linezolid/Dextrose 300 ml @  300 mls/hr Q12HR IV  Last administered on 3/26/20at

21:04;  Start 3/20/20 at 20:00;  Stop 3/27/20 at 07:50;  Status DC


Acetaminophen (Tylenol) 650 mg PRN Q6HRS  PRN PO MILD PAIN / TEMP;  Start 

3/21/20 at 03:30;  Stop 3/21/20 at 03:36;  Status DC


Acetaminophen (Tylenol) 650 mg PRN Q6HRS  PRN PEG MILD PAIN / TEMP Last 

administered on 4/16/20at 19:56;  Start 3/21/20 at 03:36;  Stop 5/13/20 at 

10:25;  Status DC


Sodium Chloride 1,000 ml @  1,000 mls/hr Q1H PRN IV hypotension;  Start 3/21/20 

at 07:50;  Stop 3/21/20 at 13:49;  Status DC


Albumin Human 200 ml @  200 mls/hr 1X PRN  PRN IV Hypotension;  Start 3/21/20 at

08:00;  Stop 3/21/20 at 13:59;  Status DC


Sodium Chloride (Normal Saline Flush) 10 ml 1X PRN  PRN IV AP catheter pack;  

Start 3/21/20 at 08:00;  Stop 3/22/20 at 07:59;  Status DC


Sodium Chloride (Normal Saline Flush) 10 ml 1X PRN  PRN IV  catheter pack;  

Start 3/21/20 at 08:00;  Stop 3/22/20 at 07:59;  Status DC


Sodium Chloride 1,000 ml @  400 mls/hr Q2H30M PRN IV PATENCY;  Start 3/21/20 at 

07:50;  Stop 3/21/20 at 19:49;  Status DC


Info (PHARMACY MONITORING -- do not chart) 1 each PRN DAILY  PRN MC SEE 

COMMENTS;  Start 3/21/20 at 08:00;  Status UNV


Info (PHARMACY MONITORING -- do not chart) 1 each PRN DAILY  PRN MC SEE 

COMMENTS;  Start 3/21/20 at 08:00;  Stop 3/23/20 at 08:25;  Status DC


Sodium Chloride 90 meq/Potassium Chloride 15 meq/ Potassium Phosphate 10 mmol/ 

Magnesium Sulfate 10 meq/Calcium Gluconate 20 meq/ Multivitamins 10 ml/Chromium/

Copper/Manganese/ Seleni/Zn 0.5 ml/ Total Parenteral Nutrition/Amino 

Acids/Dextrose/ Fat Emulsion Intravenous 1,512 ml @  63 mls/hr TPN  CONT IV  

Last administered on 3/21/20at 20:57;  Start 3/21/20 at 22:00;  Stop 3/22/20 at 

21:59;  Status DC


Sodium Chloride 90 meq/Potassium Chloride 15 meq/ Potassium Phosphate 15 mmol/ 

Magnesium Sulfate 10 meq/Calcium Gluconate 20 meq/ Multivitamins 10 ml/Chromium/

Copper/Manganese/ Seleni/Zn 0.5 ml/ Total Parenteral Nutrition/Amino Ac

ids/Dextrose/ Fat Emulsion Intravenous 1,512 ml @  63 mls/hr TPN  CONT IV ;  

Start 3/22/20 at 22:00;  Stop 3/22/20 at 14:16;  Status DC


Sodium Chloride 90 meq/Potassium Chloride 15 meq/ Potassium Phosphate 15 mmol/ 

Magnesium Sulfate 10 meq/Calcium Gluconate 20 meq/ Multivitamins 10 ml/Chromium/

Copper/Manganese/ Seleni/Zn 0.5 ml/ Total Parenteral Nutrition/Amino 

Acids/Dextrose/ Fat Emulsion Intravenous 1,200 ml @  50 mls/hr TPN  CONT IV ;  

Start 3/22/20 at 22:00;  Stop 3/22/20 at 14:17;  Status DC


Sodium Chloride 90 meq/Potassium Chloride 15 meq/ Potassium Phosphate 10 mmol/ 

Magnesium Sulfate 10 meq/Calcium Gluconate 20 meq/ Multivitamins 10 ml/Chromium/

Copper/Manganese/ Seleni/Zn 0.5 ml/ Total Parenteral Nutrition/Amino 

Acids/Dextrose/ Fat Emulsion Intravenous 1,200 ml @  50 mls/hr TPN  CONT IV  

Last administered on 3/22/20at 23:29;  Start 3/22/20 at 22:00;  Stop 3/23/20 at 

21:59;  Status DC


Sodium Chloride 1,000 ml @  1,000 mls/hr Q1H PRN IV hypotension;  Start 3/23/20 

at 07:28;  Stop 3/23/20 at 13:27;  Status DC


Albumin Human 200 ml @  200 mls/hr 1X  ONCE IV  Last administered on 3/23/20at 

08:51;  Start 3/23/20 at 07:30;  Stop 3/23/20 at 08:29;  Status DC


Diphenhydramine HCl (Benadryl) 25 mg 1X PRN  PRN IV ITCHING;  Start 3/23/20 at 

07:30;  Stop 3/24/20 at 07:29;  Status DC


Diphenhydramine HCl (Benadryl) 25 mg 1X PRN  PRN IV ITCHING;  Start 3/23/20 at 

07:30;  Stop 3/24/20 at 07:29;  Status DC


Sodium Chloride 1,000 ml @  400 mls/hr Q2H30M PRN IV PATENCY;  Start 3/23/20 at 

07:28;  Stop 3/23/20 at 19:27;  Status DC


Info (PHARMACY MONITORING -- do not chart) 1 each PRN DAILY  PRN MC SEE 

COMMENTS;  Start 3/23/20 at 07:30;  Stop 4/3/20 at 13:01;  Status DC


Metronidazole 100 ml @  100 mls/hr Q6HRS IV  Last administered on 4/8/20at 

06:26;  Start 3/23/20 at 08:30;  Stop 4/8/20 at 09:58;  Status DC


Micafungin Sodium 100 mg/Dextrose 100 ml @  100 mls/hr Q24H IV  Last 

administered on 4/30/20at 08:18;  Start 3/23/20 at 09:00;  Stop 4/30/20 at 

20:58;  Status DC


Propofol 0 ml @ As Directed STK-MED ONCE IV ;  Start 3/23/20 at 07:53;  Stop 

3/23/20 at 07:53;  Status DC


Etomidate (Amidate) 20 mg STK-MED ONCE IV ;  Start 3/23/20 at 07:53;  Stop 

3/23/20 at 07:54;  Status DC


Midazolam HCl (Versed) 5 mg STK-MED ONCE .ROUTE ;  Start 3/23/20 at 07:57;  Stop

3/23/20 at 07:57;  Status DC


Fentanyl Citrate 30 ml @ 0 mls/hr CONT  PRN IV SEE PROTOCOL Last administered on

4/17/20at 06:12;  Start 3/23/20 at 08:15;  Stop 4/17/20 at 09:19;  Status DC


Artificial Tears (Artificial Tears) 1 drop PRN Q1HR  PRN OU DRY EYE, 1st choice;

 Start 3/23/20 at 08:15;  Stop 4/29/20 at 05:31;  Status DC


Midazolam HCl 50 mg/Sodium Chloride 50 ml @ 0 mls/hr CONT  PRN IV SEE PROTOCOL 

Last administered on 3/26/20at 22:39;  Start 3/23/20 at 08:15;  Stop 3/28/20 at 

15:59;  Status DC


Etomidate (Amidate) 8 mg 1X  ONCE IV  Last administered on 3/23/20at 08:33;  

Start 3/23/20 at 08:30;  Stop 3/23/20 at 08:31;  Status DC


Succinylcholine Chloride (Anectine) 120 mg 1X  ONCE IV  Last administered on 

3/23/20at 08:34;  Start 3/23/20 at 08:30;  Stop 3/23/20 at 08:31;  Status DC


Midazolam HCl (Versed) 5 mg 1X  ONCE IV ;  Start 3/23/20 at 08:30;  Stop 3/23/20

at 08:31;  Status DC


Potassium Chloride 15 meq/ Bicarbonate Dialysis Soln w/ out KCl 5,007.5 ml  @ 

1,000 mls/ hr Q5H1M IV  Last administered on 3/24/20at 11:11;  Start 3/23/20 at 

12:00;  Stop 3/24/20 at 11:15;  Status DC


Potassium Chloride 15 meq/ Bicarbonate Dialysis Soln w/ out KCl 5,007.5 ml  @ 

1,000 mls/ hr Q5H1M IV  Last administered on 3/24/20at 11:12;  Start 3/23/20 at 

12:00;  Stop 3/24/20 at 11:17;  Status DC


Potassium Chloride 15 meq/ Bicarbonate Dialysis Soln w/ out KCl 5,007.5 ml  @ 

1,000 mls/ hr Q5H1M IV  Last administered on 3/24/20at 11:11;  Start 3/23/20 at 

12:00;  Stop 3/24/20 at 11:19;  Status DC


Sodium Chloride 90 meq/Potassium Chloride 15 meq/ Potassium Phosphate 10 mmol/ 

Magnesium Sulfate 10 meq/Calcium Gluconate 20 meq/ Multivitamins 10 ml/Chromium/

Copper/Manganese/ Seleni/Zn 0.5 ml/ Total Parenteral Nutrition/Amino 

Acids/Dextrose/ Fat Emulsion Intravenous 1,400 ml @  58.333 mls/ hr TPN  CONT IV

 Last administered on 3/23/20at 21:42;  Start 3/23/20 at 22:00;  Stop 3/24/20 at

21:59;  Status DC


Heparin Sodium (Porcine) (Heparin Sodium) 5,000 unit Q8HRS SQ  Last administered

on 3/28/20at 05:55;  Start 3/23/20 at 15:00;  Stop 3/28/20 at 13:28;  Status DC


Meropenem 500 mg/ Sodium Chloride 50 ml @  100 mls/hr Q6HRS IV  Last 

administered on 3/25/20at 06:00;  Start 3/24/20 at 09:00;  Stop 3/25/20 at 

07:29;  Status DC


Potassium Phosphate 20 mmol/ Sodium Chloride 106.6667 ml @  51.667 m... 1X  ONCE

IV  Last administered on 3/24/20at 11:22;  Start 3/24/20 at 10:15;  Stop 3/24/20

at 12:18;  Status DC


Acetaminophen (Tylenol Supp) 650 mg PRN Q6HRS  PRN NC MILD PAIN / TEMP > 100.3'F

Last administered on 7/15/20at 19:52;  Start 3/24/20 at 10:30


Potassium Chloride/Water 100 ml @  100 mls/hr Q1H IV  Last administered on 

3/24/20at 12:12;  Start 3/24/20 at 11:00;  Stop 3/24/20 at 12:59;  Status DC


Potassium Chloride 20 meq/ Bicarbonate Dialysis Soln w/ out KCl 5,010 ml @  

1,000 mls/hr Q5H1M IV  Last administered on 3/25/20at 08:48;  Start 3/24/20 at 

12:00;  Stop 3/25/20 at 13:03;  Status DC


Potassium Chloride 20 meq/ Bicarbonate Dialysis Soln w/ out KCl 5,010 ml @  

1,000 mls/hr Q5H1M IV  Last administered on 3/29/20at 14:52;  Start 3/24/20 at 

11:30;  Stop 3/29/20 at 19:59;  Status DC


Potassium Chloride 20 meq/ Bicarbonate Dialysis Soln w/ out KCl 5,010 ml @  

1,000 mls/hr Q5H1M IV  Last administered on 3/29/20at 14:53;  Start 3/24/20 at 

11:30;  Stop 3/29/20 at 19:59;  Status DC


Sodium Chloride 90 meq/Potassium Chloride 15 meq/ Potassium Phosphate 15 mmol/ 

Magnesium Sulfate 10 meq/Calcium Gluconate 15 meq/ Multivitamins 10 ml/Chromium/

Copper/Manganese/ Seleni/Zn 0.5 ml/ Total Parenteral Nutrition/Amino 

Acids/Dextrose/ Fat Emulsion Intravenous 1,400 ml @  58.333 mls/ hr TPN  CONT IV

 Last administered on 3/24/20at 22:17;  Start 3/24/20 at 22:00;  Stop 3/25/20 at

21:59;  Status DC


Cefepime HCl (Maxipime) 2 gm Q12HR IVP  Last administered on 4/7/20at 20:56;  

Start 3/25/20 at 09:00;  Stop 4/8/20 at 09:58;  Status DC


Daptomycin 500 mg/ Sodium Chloride 50 ml @  100 mls/hr Q48H IV  Last 

administered on 4/10/20at 09:57;  Start 3/25/20 at 08:30;  Stop 4/10/20 at 

10:07;  Status DC


Lidocaine HCl (Buffered Lidocaine 1%) 3 ml 1X  ONCE INJ  Last administered on 

3/25/20at 10:27;  Start 3/25/20 at 10:30;  Stop 3/25/20 at 10:31;  Status DC


Potassium Phosphate 20 mmol/ Sodium Chloride 106.6667 ml @  51.667 m... 1X  ONCE

IV  Last administered on 3/25/20at 12:51;  Start 3/25/20 at 13:00;  Stop 3/25/20

at 15:03;  Status DC


Sodium Chloride 90 meq/Potassium Chloride 15 meq/ Potassium Phosphate 18 mmol/ 

Magnesium Sulfate 8 meq/Calcium Gluconate 15 meq/ Multivitamins 10 ml/Chromium/ 

Copper/Manganese/ Seleni/Zn 0.5 ml/ Total Parenteral Nutrition/Amino 

Acids/Dextrose/ Fat Emulsion Intravenous 1,400 ml @  58.333 mls/ hr TPN  CONT IV

 Last administered on 3/25/20at 22:16;  Start 3/25/20 at 22:00;  Stop 3/26/20 at

21:59;  Status DC


Potassium Chloride 20 meq/ Bicarbonate Dialysis Soln w/ out KCl 5,010 ml @  

1,000 mls/hr Q5H1M IV  Last administered on 3/29/20at 14:54;  Start 3/25/20 at 

16:00;  Stop 3/29/20 at 19:59;  Status DC


Multi-Ingred Cream/Lotion/Oil/ Oint (Artificial Tears Eye Ointment) 1 thomas PRN 

Q1HR  PRN OU DRY EYE, 2nd choice Last administered on 4/13/20at 08:19;  Start 

3/25/20 at 17:30;  Stop 6/3/20 at 14:39;  Status DC


Sodium Chloride 90 meq/Potassium Chloride 15 meq/ Potassium Phosphate 18 mmol/ 

Magnesium Sulfate 8 meq/Calcium Gluconate 15 meq/ Multivitamins 10 ml/Chromium/ 

Copper/Manganese/ Seleni/Zn 0.5 ml/ Total Parenteral Nutrition/Amino 

Acids/Dextrose/ Fat Emulsion Intravenous 1,400 ml @  58.333 mls/ hr TPN  CONT IV

 Last administered on 3/26/20at 22:00;  Start 3/26/20 at 22:00;  Stop 3/27/20 at

21:59;  Status DC


Albumin Human 500 ml @  125 mls/hr 1X  ONCE IV ;  Start 3/26/20 at 14:15;  Stop 

3/26/20 at 18:14;  Status DC


Sodium Chloride 90 meq/Potassium Chloride 15 meq/ Potassium Phosphate 18 mmol/ 

Magnesium Sulfate 8 meq/Calcium Gluconate 15 meq/ Multivitamins 10 ml/Chromium/ 

Copper/Manganese/ Seleni/Zn 0.5 ml/ Insulin Human Regular 10 unit/ Total 

Parenteral Nutrition/Amino Acids/Dextrose/ Fat Emulsion Intravenous 1,400 ml @  

58.333 mls/ hr TPN  CONT IV  Last administered on 3/27/20at 21:43;  Start 

3/27/20 at 22:00;  Stop 3/28/20 at 21:59;  Status DC


Lidocaine HCl (Buffered Lidocaine 1%) 3 ml STK-MED ONCE .ROUTE ;  Start 3/25/20 

at 10:00;  Stop 3/27/20 at 13:57;  Status DC


Midazolam HCl 100 mg/Sodium Chloride 100 ml @ 7 mls/hr CONT  PRN IV SEE PROTOCOL

Last administered on 4/8/20at 15:35;  Start 3/28/20 at 16:00;  Stop 6/3/20 at 

14:38;  Status DC


Sodium Chloride 90 meq/Potassium Chloride 15 meq/ Potassium Phosphate 18 mmol/ 

Magnesium Sulfate 8 meq/Calcium Gluconate 15 meq/ Multivitamins 10 ml/Chromium/ 

Copper/Manganese/ Seleni/Zn 0.5 ml/ Insulin Human Regular 15 unit/ Total 

Parenteral Nutrition/Amino Acids/Dextrose/ Fat Emulsion Intravenous 1,400 ml @  

58.333 mls/ hr TPN  CONT IV  Last administered on 3/28/20at 20:34;  Start 

3/28/20 at 22:00;  Stop 3/29/20 at 21:59;  Status DC


Info ([facility] Electrolyte Protocol) 1 ea CONT PRN  PRN MC PER PROTOCOL;  Start 

3/29/20 at 13:15


Sodium Chloride 90 meq/Potassium Chloride 15 meq/ Potassium Phosphate 18 mmol/ 

Magnesium Sulfate 8 meq/Calcium Gluconate 15 meq/ Multivitamins 10 ml/Chromium/ 

Copper/Manganese/ Seleni/Zn 0.5 ml/ Insulin Human Regular 15 unit/ Total 

Parenteral Nutrition/Amino Acids/Dextrose/ Fat Emulsion Intravenous 1,400 ml @  

58.333 mls/ hr TPN  CONT IV  Last administered on 3/29/20at 22:05;  Start 

3/29/20 at 22:00;  Stop 3/30/20 at 21:59;  Status DC


Potassium Chloride 15 meq/ Bicarbonate Dialysis Soln w/ out KCl 5,007.5 ml  @ 

1,000 mls/ hr Q5H1M IV  Last administered on 4/1/20at 18:14;  Start 3/29/20 at 

20:00;  Stop 4/2/20 at 13:08;  Status DC


Potassium Chloride 15 meq/ Bicarbonate Dialysis Soln w/ out KCl 5,007.5 ml  @ 

1,000 mls/ hr Q5H1M IV  Last administered on 4/1/20at 18:14;  Start 3/29/20 at 

20:00;  Stop 4/2/20 at 13:08;  Status DC


Potassium Chloride 15 meq/ Bicarbonate Dialysis Soln w/ out KCl 5,007.5 ml  @ 

1,000 mls/ hr Q5H1M IV  Last administered on 4/1/20at 18:14;  Start 3/29/20 at 

20:00;  Stop 4/2/20 at 13:08;  Status DC


Iohexol (Omnipaque 240 Mg/ml) 30 ml 1X  ONCE PO  Last administered on 3/30/20at 

11:30;  Start 3/30/20 at 11:30;  Stop 3/30/20 at 11:33;  Status DC


Info (CONTRAST GIVEN -- Rx MONITORING) 1 each PRN DAILY  PRN MC SEE COMMENTS;  

Start 3/30/20 at 11:45;  Stop 4/1/20 at 11:44;  Status DC


Sodium Chloride 90 meq/Potassium Chloride 15 meq/ Potassium Phosphate 18 mmol/ 

Magnesium Sulfate 8 meq/Calcium Gluconate 15 meq/ Multivitamins 10 ml/Chromium/ 

Copper/Manganese/ Seleni/Zn 0.5 ml/ Insulin Human Regular 15 unit/ Total 

Parenteral Nutrition/Amino Acids/Dextrose/ Fat Emulsion Intravenous 1,400 ml @  

58.333 mls/ hr TPN  CONT IV  Last administered on 3/30/20at 21:47;  Start 

3/30/20 at 22:00;  Stop 3/31/20 at 21:59;  Status DC


Sodium Chloride 90 meq/Potassium Chloride 15 meq/ Potassium Phosphate 18 mmol/ 

Magnesium Sulfate 8 meq/Calcium Gluconate 15 meq/ Multivitamins 10 ml/Chromium/ 

Copper/Manganese/ Seleni/Zn 0.5 ml/ Insulin Human Regular 20 unit/ Total 

Parenteral Nutrition/Amino Acids/Dextrose/ Fat Emulsion Intravenous 1,400 ml @  

58.333 mls/ hr TPN  CONT IV  Last administered on 3/31/20at 21:36;  Start 

3/31/20 at 22:00;  Stop 4/1/20 at 21:59;  Status DC


Alteplase, Recombinant (Cathflo For Central Catheter Clearance) 1 mg 1X  ONCE 

INT CAT  Last administered on 3/31/20at 20:03;  Start 3/31/20 at 19:30;  Stop 

3/31/20 at 19:46;  Status DC


Alteplase, Recombinant (Cathflo For Central Catheter Clearance) 1 mg 1X  ONCE 

INT CAT  Last administered on 3/31/20at 22:05;  Start 3/31/20 at 22:00;  Stop 

3/31/20 at 22:01;  Status DC


Sodium Chloride 90 meq/Potassium Chloride 15 meq/ Potassium Phosphate 18 mmol/ 

Magnesium Sulfate 8 meq/Calcium Gluconate 15 meq/ Multivitamins 10 ml/Chromium/ 

Copper/Manganese/ Seleni/Zn 0.5 ml/ Insulin Human Regular 20 unit/ Total 

Parenteral Nutrition/Amino Acids/Dextrose/ Fat Emulsion Intravenous 1,400 ml @  

58.333 mls/ hr TPN  CONT IV  Last administered on 4/1/20at 21:30;  Start 4/1/20 

at 22:00;  Stop 4/2/20 at 21:59;  Status DC


Dexmedetomidine HCl 400 mcg/ Sodium Chloride 100 ml @ 0 mls/hr CONT  PRN IV 

ANXIETY / AGITATION Last administered on 5/30/20at 12:57;  Start 4/2/20 at 

08:15;  Stop 5/30/20 at 18:31;  Status DC


Sodium Chloride 500 ml @  500 mls/hr 1X PRN  PRN IV ELEVATED BP, SEE COMMENTS;  

Start 4/2/20 at 08:15


Atropine Sulfate (ATROPINE 0.5mg SYRINGE) 0.5 mg PRN Q5MIN  PRN IV SEE COMMENTS;

 Start 4/2/20 at 08:15


Furosemide (Lasix) 20 mg 1X  ONCE IVP  Last administered on 4/2/20at 08:19;  

Start 4/2/20 at 08:15;  Stop 4/2/20 at 08:16;  Status DC


Lidocaine HCl (Buffered Lidocaine 1%) 3 ml STK-MED ONCE .ROUTE ;  Start 4/2/20 

at 08:39;  Stop 4/2/20 at 08:39;  Status DC


Lidocaine HCl (Buffered Lidocaine 1%) 6 ml 1X  ONCE INJ  Last administered on 

4/2/20at 09:05;  Start 4/2/20 at 09:00;  Stop 4/2/20 at 09:06;  Status DC


Sodium Chloride 90 meq/Potassium Chloride 15 meq/ Potassium Phosphate 18 mmol/ 

Magnesium Sulfate 8 meq/Calcium Gluconate 15 meq/ Multivitamins 10 ml/Chromium/ 

Copper/Manganese/ Seleni/Zn 0.5 ml/ Insulin Human Regular 20 unit/ Total 

Parenteral Nutrition/Amino Acids/Dextrose/ Fat Emulsion Intravenous 1,400 ml @  

58.333 mls/ hr TPN  CONT IV  Last administered on 4/2/20at 22:45;  Start 4/2/20 

at 22:00;  Stop 4/3/20 at 21:59;  Status DC


Sodium Chloride 1,000 ml @  1,000 mls/hr Q1H PRN IV hypotension;  Start 4/3/20 

at 07:30;  Stop 4/3/20 at 13:29;  Status DC


Albumin Human 200 ml @  200 mls/hr 1X PRN  PRN IV Hypotension Last administered 

on 4/3/20at 09:36;  Start 4/3/20 at 07:30;  Stop 4/3/20 at 13:29;  Status DC


Sodium Chloride (Normal Saline Flush) 10 ml 1X PRN  PRN IV AP catheter pack;  

Start 4/3/20 at 07:30;  Stop 4/3/20 at 21:29;  Status DC


Sodium Chloride (Normal Saline Flush) 10 ml 1X PRN  PRN IV  catheter pack;  

Start 4/3/20 at 07:30;  Stop 4/4/20 at 07:29;  Status DC


Sodium Chloride 1,000 ml @  400 mls/hr Q2H30M PRN IV PATENCY;  Start 4/3/20 at 

07:30;  Stop 4/3/20 at 19:29;  Status DC


Info (PHARMACY MONITORING -- do not chart) 1 each PRN DAILY  PRN MC SEE 

COMMENTS;  Start 4/3/20 at 07:30;  Stop 4/3/20 at 13:02;  Status DC


Info (PHARMACY MONITORING -- do not chart) 1 each PRN DAILY  PRN MC SEE 

COMMENTS;  Start 4/3/20 at 07:30;  Stop 4/5/20 at 12:45;  Status DC


Sodium Chloride 90 meq/Potassium Chloride 15 meq/ Potassium Phosphate 10 mmol/ 

Magnesium Sulfate 8 meq/Calcium Gluconate 15 meq/ Multivitamins 10 ml/Chromium/ 

Copper/Manganese/ Seleni/Zn 0.5 ml/ Insulin Human Regular 25 unit/ Total 

Parenteral Nutrition/Amino Acids/Dextrose/ Fat Emulsion Intravenous 1,400 ml @  

58.333 mls/ hr TPN  CONT IV  Last administered on 4/3/20at 22:19;  Start 4/3/20 

at 22:00;  Stop 4/4/20 at 21:59;  Status DC


Heparin Sodium (Porcine) (Heparin Sodium) 5,000 unit Q12HR SQ  Last administered

on 4/26/20at 08:59;  Start 4/3/20 at 21:00;  Stop 4/26/20 at 10:05;  Status DC


Ondansetron HCl (Zofran) 4 mg PRN Q6HRS  PRN IV NAUSEA/VOMITING;  Start 4/6/20 

at 07:00;  Stop 4/7/20 at 06:59;  Status DC


Fentanyl Citrate (Fentanyl 2ml Vial) 25 mcg PRN Q5MIN  PRN IV MILD PAIN 1-3;  

Start 4/6/20 at 07:00;  Stop 4/7/20 at 06:59;  Status DC


Fentanyl Citrate (Fentanyl 2ml Vial) 50 mcg PRN Q5MIN  PRN IV MODERATE TO SEVERE

PAIN;  Start 4/6/20 at 07:00;  Stop 4/7/20 at 06:59;  Status DC


Ringer's Solution 1,000 ml @  30 mls/hr Q24H IV ;  Start 4/6/20 at 07:00;  Stop 

4/6/20 at 18:59;  Status DC


Lidocaine HCl (Xylocaine-Mpf 1% 2ml Vial) 2 ml PRN 1X  PRN ID PRIOR TO IV START;

 Start 4/6/20 at 07:00;  Stop 4/7/20 at 06:59;  Status DC


Prochlorperazine Edisylate (Compazine) 5 mg PACU PRN  PRN IV NAUSEA, MRX1;  

Start 4/6/20 at 07:00;  Stop 4/7/20 at 06:59;  Status DC


Sodium Chloride 1,000 ml @  1,000 mls/hr Q1H PRN IV hypotension;  Start 4/4/20 

at 09:10;  Stop 4/4/20 at 15:09;  Status DC


Albumin Human 200 ml @  200 mls/hr 1X PRN  PRN IV Hypotension Last administered 

on 4/4/20at 10:10;  Start 4/4/20 at 09:15;  Stop 4/4/20 at 15:14;  Status DC


Sodium Chloride 1,000 ml @  400 mls/hr Q2H30M PRN IV PATENCY;  Start 4/4/20 at 

09:10;  Stop 4/4/20 at 21:09;  Status DC


Info (PHARMACY MONITORING -- do not chart) 1 each PRN DAILY  PRN MC SEE 

COMMENTS;  Start 4/4/20 at 09:15;  Stop 4/5/20 at 12:45;  Status DC


Info (PHARMACY MONITORING -- do not chart) 1 each PRN DAILY  PRN MC SEE 

COMMENTS;  Start 4/4/20 at 09:15;  Stop 4/5/20 at 12:45;  Status DC


Sodium Chloride 90 meq/Potassium Chloride 15 meq/ Potassium Phosphate 10 mmol/ 

Magnesium Sulfate 8 meq/Calcium Gluconate 15 meq/ Multivitamins 10 ml/Chromium/ 

Copper/Manganese/ Seleni/Zn 0.5 ml/ Insulin Human Regular 25 unit/ Total 

Parenteral Nutrition/Amino Acids/Dextrose/ Fat Emulsion Intravenous 1,400 ml @  

58.333 mls/ hr TPN  CONT IV  Last administered on 4/4/20at 22:10;  Start 4/4/20 

at 22:00;  Stop 4/5/20 at 21:59;  Status DC


Magnesium Sulfate 50 ml @ 25 mls/hr PRN DAILY  PRN IV for Mag < 1.7 on am labs 

Last administered on 6/18/20at 10:57;  Start 4/5/20 at 09:15


Sodium Chloride 90 meq/Potassium Chloride 15 meq/ Potassium Phosphate 10 mmol/ 

Magnesium Sulfate 8 meq/Calcium Gluconate 15 meq/ Multivitamins 10 ml/Chromium/ 

Copper/Manganese/ Seleni/Zn 0.5 ml/ Insulin Human Regular 25 unit/ Total 

Parenteral Nutrition/Amino Acids/Dextrose/ Fat Emulsion Intravenous 1,400 ml @  

58.333 mls/ hr TPN  CONT IV  Last administered on 4/5/20at 21:20;  Start 4/5/20 

at 22:00;  Stop 4/6/20 at 21:59;  Status DC


Sodium Chloride 1,000 ml @  1,000 mls/hr Q1H PRN IV hypotension;  Start 4/5/20 

at 12:23;  Stop 4/5/20 at 18:22;  Status DC


Albumin Human 200 ml @  200 mls/hr 1X  ONCE IV  Last administered on 4/5/20at 

13:34;  Start 4/5/20 at 12:30;  Stop 4/5/20 at 13:29;  Status DC


Diphenhydramine HCl (Benadryl) 25 mg 1X PRN  PRN IV ITCHING;  Start 4/5/20 at 

12:30;  Stop 4/6/20 at 12:29;  Status DC


Diphenhydramine HCl (Benadryl) 25 mg 1X PRN  PRN IV ITCHING;  Start 4/5/20 at 

12:30;  Stop 4/6/20 at 12:29;  Status DC


Info (PHARMACY MONITORING -- do not chart) 1 each PRN DAILY  PRN MC SEE 

COMMENTS;  Start 4/5/20 at 12:30;  Status Cancel


Bupivacaine HCl/ Epinephrine Bitart (Sensorcain-Epi 0.5%-1:[id number] Mpf) 30 ml 

STK-MED ONCE .ROUTE  Last administered on 4/6/20at 11:44;  Start 4/6/20 at 

11:00;  Stop 4/6/20 at 11:01;  Status DC


Cellulose (Surgicel Fibrillar 1x2) 1 each STK-MED ONCE .ROUTE ;  Start 4/6/20 at

11:00;  Stop 4/6/20 at 11:01;  Status DC


Sodium Chloride 90 meq/Potassium Chloride 15 meq/ Potassium Phosphate 10 mmol/ 

Magnesium Sulfate 12 meq/Calcium Gluconate 15 meq/ Multivitamins 10 ml/Chromium/

Copper/Manganese/ Seleni/Zn 0.5 ml/ Insulin Human Regular 25 unit/ Total 

Parenteral Nutrition/Amino Acids/Dextrose/ Fat Emulsion Intravenous 1,400 ml @  

58.333 mls/ hr TPN  CONT IV  Last administered on 4/6/20at 22:24;  Start 4/6/20 

at 22:00;  Stop 4/7/20 at 21:59;  Status DC


Propofol 20 ml @ As Directed STK-MED ONCE IV ;  Start 4/6/20 at 11:07;  Stop 4/6 /20 at 11:07;  Status DC


Cellulose (Surgicel Hemostat 4x8) 1 each STK-MED ONCE .ROUTE  Last administered 

on 4/6/20at 11:44;  Start 4/6/20 at 11:55;  Stop 4/6/20 at 11:56;  Status DC


Sevoflurane (Ultane) 60 ml STK-MED ONCE IH ;  Start 4/6/20 at 12:46;  Stop 

4/6/20 at 12:46;  Status DC


Sodium Chloride 1,000 ml @  1,000 mls/hr Q1H PRN IV hypotension;  Start 4/6/20 

at 13:51;  Stop 4/6/20 at 19:50;  Status DC


Albumin Human 200 ml @  200 mls/hr 1X PRN  PRN IV Hypotension Last administered 

on 4/6/20at 14:51;  Start 4/6/20 at 14:00;  Stop 4/6/20 at 19:59;  Status DC


Diphenhydramine HCl (Benadryl) 25 mg 1X PRN  PRN IV ITCHING;  Start 4/6/20 at 

14:00;  Stop 4/7/20 at 13:59;  Status DC


Diphenhydramine HCl (Benadryl) 25 mg 1X PRN  PRN IV ITCHING;  Start 4/6/20 at 

14:00;  Stop 4/7/20 at 13:59;  Status DC


Sodium Chloride 1,000 ml @  400 mls/hr Q2H30M PRN IV PATENCY;  Start 4/6/20 at 

13:51;  Stop 4/7/20 at 01:50;  Status DC


Info (PHARMACY MONITORING -- do not chart) 1 each PRN DAILY  PRN MC SEE 

COMMENTS;  Start 4/6/20 at 14:00;  Stop 4/9/20 at 08:16;  Status DC


Heparin Sodium (Porcine) (Hep Lock Adult) 500 unit STK-MED ONCE IVP ;  Start 

4/7/20 at 09:29;  Stop 4/7/20 at 09:30;  Status DC


Sodium Chloride 1,000 ml @  1,000 mls/hr Q1H PRN IV hypotension;  Start 4/7/20 

at 10:43;  Stop 4/7/20 at 16:42;  Status DC


Sodium Chloride 1,000 ml @  400 mls/hr Q2H30M PRN IV PATENCY;  Start 4/7/20 at 

10:43;  Stop 4/7/20 at 22:42;  Status DC


Info (PHARMACY MONITORING -- do not chart) 1 each PRN DAILY  PRN MC SEE 

COMMENTS;  Start 4/7/20 at 10:45;  Status UNV


Info (PHARMACY MONITORING -- do not chart) 1 each PRN DAILY  PRN MC SEE 

COMMENTS;  Start 4/7/20 at 10:45;  Status UNV


Sodium Chloride 90 meq/Potassium Chloride 15 meq/ Magnesium Sulfate 12 

meq/Calcium Gluconate 15 meq/ Multivitamins 10 ml/Chromium/ Copper/Manganese/ 

Seleni/Zn 0.5 ml/ Insulin Human Regular 25 unit/ Total Parenteral 

Nutrition/Amino Acids/Dextrose/ Fat Emulsion Intravenous 1,400 ml @  58.333 mls/

hr TPN  CONT IV  Last administered on 4/7/20at 22:13;  Start 4/7/20 at 22:00;  

Stop 4/8/20 at 21:59;  Status DC


Sodium Chloride 1,000 ml @  1,000 mls/hr Q1H PRN IV hypotension;  Start 4/8/20 

at 07:50;  Stop 4/8/20 at 13:49;  Status DC


Albumin Human 200 ml @  200 mls/hr 1X  ONCE IV ;  Start 4/8/20 at 08:00;  Stop 

4/8/20 at 08:53;  Status DC


Diphenhydramine HCl (Benadryl) 25 mg 1X PRN  PRN IV ITCHING;  Start 4/8/20 at 

08:00;  Stop 4/9/20 at 07:59;  Status DC


Diphenhydramine HCl (Benadryl) 25 mg 1X PRN  PRN IV ITCHING;  Start 4/8/20 at 

08:00;  Stop 4/9/20 at 07:59;  Status DC


Info (PHARMACY MONITORING -- do not chart) 1 each PRN DAILY  PRN MC SEE 

COMMENTS;  Start 4/8/20 at 08:00;  Stop 4/9/20 at 08:16;  Status DC


Albumin Human 50 ml @ 50 mls/hr 1X  ONCE IV ;  Start 4/8/20 at 08:53;  Stop 

4/8/20 at 08:56;  Status DC


Albumin Human 200 ml @  50 mls/hr PRN 1X  PRN IV HYPOTENSION Last administered 

on 4/14/20at 11:54;  Start 4/8/20 at 09:00;  Stop 5/21/20 at 11:14;  Status DC


Meropenem 500 mg/ Sodium Chloride 50 ml @  100 mls/hr Q12H IV  Last administered

on 4/28/20at 10:45;  Start 4/8/20 at 10:00;  Stop 4/28/20 at 12:37;  Status DC


Sodium Chloride 90 meq/Magnesium Sulfate 12 meq/ Calcium Gluconate 15 meq/ 

Multivitamins 10 ml/Chromium/ Copper/Manganese/ Seleni/Zn 0.5 ml/ Insulin Human 

Regular 25 unit/ Total Parenteral Nutrition/Amino Acids/Dextrose/ Fat Emulsion 

Intravenous 1,400 ml @  58.333 mls/ hr TPN  CONT IV  Last administered on 

4/8/20at 21:41;  Start 4/8/20 at 22:00;  Stop 4/9/20 at 21:59;  Status DC


Sodium Chloride 1,000 ml @  1,000 mls/hr Q1H PRN IV hypotension;  Start 4/9/20 

at 07:58;  Stop 4/9/20 at 13:57;  Status DC


Albumin Human 200 ml @  200 mls/hr 1X PRN  PRN IV Hypotension Last administered 

on 4/9/20at 09:30;  Start 4/9/20 at 08:00;  Stop 4/9/20 at 13:59;  Status DC


Sodium Chloride 1,000 ml @  400 mls/hr Q2H30M PRN IV PATENCY;  Start 4/9/20 at 

07:58;  Stop 4/9/20 at 19:57;  Status DC


Info (PHARMACY MONITORING -- do not chart) 1 each PRN DAILY  PRN MC SEE MIKEY

TS;  Start 4/9/20 at 08:00;  Status Cancel


Info (PHARMACY MONITORING -- do not chart) 1 each PRN DAILY  PRN MC SEE 

COMMENTS;  Start 4/9/20 at 08:15;  Status UNV


Sodium Chloride 90 meq/Potassium Phosphate 5 mmol/ Magnesium Sulfate 12 

meq/Calcium Gluconate 15 meq/ Multivitamins 10 ml/Chromium/ Copper/Manganese/ 

Seleni/Zn 0.5 ml/ Insulin Human Regular 30 unit/ Total Parenteral Nutriti

on/Amino Acids/Dextrose/ Fat Emulsion Intravenous 1,400 ml @  58.333 mls/ hr TPN

 CONT IV  Last administered on 4/9/20at 22:08;  Start 4/9/20 at 22:00;  Stop 

4/10/20 at 21:59;  Status DC


Linezolid/Dextrose 300 ml @  300 mls/hr Q12HR IV  Last administered on 4/20/20at

20:40;  Start 4/10/20 at 11:00;  Stop 4/21/20 at 08:10;  Status DC


Sodium Chloride 90 meq/Potassium Phosphate 15 mmol/ Magnesium Sulfate 12 

meq/Calcium Gluconate 15 meq/ Multivitamins 10 ml/Chromium/ Copper/Manganese/ 

Seleni/Zn 0.5 ml/ Insulin Human Regular 30 unit/ Total Parenteral 

Nutrition/Amino Acids/Dextrose/ Fat Emulsion Intravenous 1,400 ml @  58.333 mls/

hr TPN  CONT IV  Last administered on 4/10/20at 21:49;  Start 4/10/20 at 22:00; 

Stop 4/11/20 at 21:59;  Status DC


Sodium Chloride 90 meq/Potassium Phosphate 15 mmol/ Magnesium Sulfate 12 

meq/Calcium Gluconate 15 meq/ Multivitamins 10 ml/Chromium/ Copper/Manganese/ 

Seleni/Zn 0.5 ml/ Insulin Human Regular 40 unit/ Total Parenteral 

Nutrition/Amino Acids/Dextrose/ Fat Emulsion Intravenous 1,400 ml @  58.333 mls/

hr TPN  CONT IV  Last administered on 4/11/20at 21:21;  Start 4/11/20 at 22:00; 

Stop 4/12/20 at 21:59;  Status DC


Sodium Chloride 1,000 ml @  1,000 mls/hr Q1H PRN IV hypotension;  Start 4/11/20 

at 13:26;  Stop 4/11/20 at 19:25;  Status DC


Albumin Human 200 ml @  200 mls/hr 1X PRN  PRN IV Hypotension Last administered 

on 4/11/20at 15:00;  Start 4/11/20 at 13:30;  Stop 4/11/20 at 19:29;  Status DC


Sodium Chloride (Normal Saline Flush) 10 ml 1X PRN  PRN IV AP catheter pack;  

Start 4/11/20 at 13:30;  Stop 4/12/20 at 13:29;  Status DC


Sodium Chloride (Normal Saline Flush) 10 ml 1X PRN  PRN IV  catheter pack;  

Start 4/11/20 at 13:30;  Stop 4/12/20 at 13:29;  Status DC


Sodium Chloride 1,000 ml @  400 mls/hr Q2H30M PRN IV PATENCY;  Start 4/11/20 at 

13:26;  Stop 4/12/20 at 01:25;  Status DC


Info (PHARMACY MONITORING -- do not chart) 1 each PRN DAILY  PRN MC SEE MIKEY

TS;  Start 4/11/20 at 13:30;  Stop 4/11/20 at 13:33;  Status DC


Info (PHARMACY MONITORING -- do not chart) 1 each PRN DAILY  PRN MC SEE 

COMMENTS;  Start 4/11/20 at 13:30;  Stop 4/11/20 at 13:34;  Status DC


Sodium Chloride 90 meq/Potassium Phosphate 19 mmol/ Magnesium Sulfate 12 

meq/Calcium Gluconate 15 meq/ Multivitamins 10 ml/Chromium/ Copper/Manganese/ 

Seleni/Zn 0.5 ml/ Insulin Human Regular 40 unit/ Total Parenteral 

Nutrition/Amino Acids/Dextrose/ Fat Emulsion Intravenous 1,400 ml @  58.333 mls/

hr TPN  CONT IV  Last administered on 4/12/20at 21:54;  Start 4/12/20 at 22:00; 

Stop 4/13/20 at 21:59;  Status DC


Sodium Chloride 1,000 ml @  1,000 mls/hr Q1H PRN IV hypotension;  Start 4/13/20 

at 09:35;  Stop 4/13/20 at 15:34;  Status DC


Albumin Human 200 ml @  200 mls/hr 1X PRN  PRN IV Hypotension;  Start 4/13/20 at

09:45;  Stop 4/13/20 at 15:44;  Status DC


Diphenhydramine HCl (Benadryl) 25 mg 1X PRN  PRN IV ITCHING;  Start 4/13/20 at 

09:45;  Stop 4/14/20 at 09:44;  Status DC


Diphenhydramine HCl (Benadryl) 25 mg 1X PRN  PRN IV ITCHING;  Start 4/13/20 at 

09:45;  Stop 4/14/20 at 09:44;  Status DC


Sodium Chloride 1,000 ml @  400 mls/hr Q2H30M PRN IV PATENCY;  Start 4/13/20 at 

09:35;  Stop 4/13/20 at 21:34;  Status DC


Info (PHARMACY MONITORING -- do not chart) 1 each PRN DAILY  PRN MC SEE 

COMMENTS;  Start 4/13/20 at 09:45;  Status Cancel


Sodium Chloride 100 meq/Potassium Phosphate 19 mmol/ Magnesium Sulfate 12 

meq/Calcium Gluconate 15 meq/ Multivitamins 10 ml/Chromium/ Copper/Manganese/ 

Seleni/Zn 0.5 ml/ Insulin Human Regular 40 unit/ Potassium Chloride 20 meq/ 

Total Parenteral Nutrition/Amino Acids/Dextrose/ Fat Emulsion Intravenous 1,400 

ml @  58.333 mls/ hr TPN  CONT IV  Last administered on 4/13/20at 22:02;  Start 

4/13/20 at 22:00;  Stop 4/14/20 at 21:59;  Status DC


Furosemide (Lasix) 40 mg 1X  ONCE IVP  Last administered on 4/13/20at 14:39;  

Start 4/13/20 at 14:30;  Stop 4/13/20 at 14:31;  Status DC


Metronidazole 100 ml @  100 mls/hr Q8HRS IV  Last administered on 4/21/20at 

06:04;  Start 4/14/20 at 10:00;  Stop 4/21/20 at 08:10;  Status DC


Sodium Chloride 1,000 ml @  1,000 mls/hr Q1H PRN IV hypotension;  Start 4/14/20 

at 08:00;  Stop 4/14/20 at 13:59;  Status DC


Albumin Human 200 ml @  200 mls/hr 1X PRN  PRN IV Hypotension;  Start 4/14/20 at

08:00;  Stop 4/14/20 at 13:59;  Status DC


Sodium Chloride 1,000 ml @  400 mls/hr Q2H30M PRN IV PATENCY;  Start 4/14/20 at 

08:00;  Stop 4/14/20 at 19:59;  Status DC


Info (PHARMACY MONITORING -- do not chart) 1 each PRN DAILY  PRN MC SEE 

COMMENTS;  Start 4/14/20 at 11:30;  Status UNV


Info (PHARMACY MONITORING -- do not chart) 1 each PRN DAILY  PRN MC SEE 

COMMENTS;  Start 4/14/20 at 11:30;  Stop 4/16/20 at 12:13;  Status DC


Sodium Chloride 100 meq/Potassium Phosphate 19 mmol/ Magnesium Sulfate 12 

meq/Calcium Gluconate 15 meq/ Multivitamins 10 ml/Chromium/ Copper/Manganese/ 

Seleni/Zn 0.5 ml/ Insulin Human Regular 40 unit/ Potassium Chloride 20 meq/ 

Total Parenteral Nutrition/Amino Acids/Dextrose/ Fat Emulsion Intravenous 1,400 

ml @  58.333 mls/ hr TPN  CONT IV  Last administered on 4/14/20at 21:52;  Start 

4/14/20 at 22:00;  Stop 4/15/20 at 21:59;  Status DC


Sodium Chloride (Normal Saline Flush) 10 ml QSHIFT  PRN IV AFTER MEDS AND BLOOD 

DRAWS;  Start 4/14/20 at 15:00;  Stop 5/12/20 at 11:27;  Status DC


Sodium Chloride (Normal Saline Flush) 10 ml PRN Q5MIN  PRN IV AFTER MEDS AND 

BLOOD DRAWS;  Start 4/14/20 at 15:00


Sodium Chloride (Normal Saline Flush) 20 ml PRN Q5MIN  PRN IV AFTER MEDS AND 

BLOOD DRAWS;  Start 4/14/20 at 15:00


Sodium Chloride 100 meq/Potassium Phosphate 19 mmol/ Magnesium Sulfate 12 

meq/Calcium Gluconate 15 meq/ Multivitamins 10 ml/Chromium/ Copper/Manganese/ 

Seleni/Zn 0.5 ml/ Insulin Human Regular 40 unit/ Potassium Chloride 20 meq/ 

Total Parenteral Nutrition/Amino Acids/Dextrose/ Fat Emulsion Intravenous 1,400 

ml @  58.333 mls/ hr TPN  CONT IV  Last administered on 4/15/20at 21:20;  Start 

4/15/20 at 22:00;  Stop 4/16/20 at 21:59;  Status DC


Lidocaine HCl (Buffered Lidocaine 1%) 3 ml STK-MED ONCE .ROUTE ;  Start 4/15/20 

at 13:16;  Stop 4/15/20 at 13:16;  Status DC


Lidocaine HCl (Buffered Lidocaine 1%) 6 ml 1X  ONCE INJ  Last administered on 

4/15/20at 13:45;  Start 4/15/20 at 13:30;  Stop 4/15/20 at 13:31;  Status DC


Albumin Human 100 ml @  100 mls/hr 1X  ONCE IV  Last administered on 4/15/20at 

15:41;  Start 4/15/20 at 15:00;  Stop 4/15/20 at 15:59;  Status DC


Albumin Human 50 ml @ 50 mls/hr 1X  ONCE IV  Last administered on 4/15/20at 

15:00;  Start 4/15/20 at 15:00;  Stop 4/15/20 at 15:59;  Status DC


Info (PHARMACY MONITORING -- do not chart) 1 each PRN DAILY  PRN MC SEE VITO

NTS;  Start 4/16/20 at 11:30;  Status Cancel


Info (PHARMACY MONITORING -- do not chart) 1 each PRN DAILY  PRN MC SEE 

COMMENTS;  Start 4/16/20 at 11:30;  Status UNV


Sodium Chloride 100 meq/Potassium Phosphate 10 mmol/ Magnesium Sulfate 12 

meq/Calcium Gluconate 15 meq/ Multivitamins 10 ml/Chromium/ Copper/Manganese/ 

Seleni/Zn 0.5 ml/ Insulin Human Regular 35 unit/ Potassium Chloride 20 meq/ 

Total Parenteral Nutrition/Amino Acids/Dextrose/ Fat Emulsion Intravenous 1,400 

ml @  58.333 mls/ hr TPN  CONT IV  Last administered on 4/16/20at 22:10;  Start 

4/16/20 at 22:00;  Stop 4/17/20 at 21:59;  Status DC


Sodium Chloride 100 meq/Potassium Phosphate 5 mmol/ Magnesium Sulfate 12 

meq/Calcium Gluconate 15 meq/ Multivitamins 10 ml/Chromium/ Copper/Manganese/ 

Seleni/Zn 0.5 ml/ Insulin Human Regular 35 unit/ Potassium Chloride 20 meq/ 

Total Parenteral Nutrition/Amino Acids/Dextrose/ Fat Emulsion Intravenous 1,400 

ml @  58.333 mls/ hr TPN  CONT IV  Last administered on 4/17/20at 22:59;  Start 

4/17/20 at 22:00;  Stop 4/18/20 at 21:59;  Status DC


Sodium Chloride 1,000 ml @  1,000 mls/hr Q1H PRN IV hypotension;  Start 4/18/20 

at 08:27;  Stop 4/18/20 at 14:26;  Status DC


Albumin Human 200 ml @  200 mls/hr 1X PRN  PRN IV Hypotension Last administered 

on 4/18/20at 09:18;  Start 4/18/20 at 08:30;  Stop 4/18/20 at 14:29;  Status DC


Sodium Chloride 1,000 ml @  400 mls/hr Q2H30M PRN IV PATENCY;  Start 4/18/20 at 

08:27;  Stop 4/18/20 at 20:26;  Status DC


Info (PHARMACY MONITORING -- do not chart) 1 each PRN DAILY  PRN MC SEE 

COMMENTS;  Start 4/18/20 at 08:30;  Status Cancel


Info (PHARMACY MONITORING -- do not chart) 1 each PRN DAILY  PRN MC SEE 

COMMENTS;  Start 4/18/20 at 08:30;  Stop 4/26/20 at 13:10;  Status DC


Sodium Chloride 100 meq/Potassium Chloride 40 meq/ Magnesium Sulfate 15 meq/

Calcium Gluconate 15 meq/ Multivitamins 10 ml/Chromium/ Copper/Manganese/ 

Seleni/Zn 0.5 ml/ Insulin Human Regular 35 unit/ Total Parenteral 

Nutrition/Amino Acids/Dextrose/ Fat Emulsion Intravenous 1,400 ml @  58.333 mls/

hr TPN  CONT IV  Last administered on 4/18/20at 22:00;  Start 4/18/20 at 22:00; 

Stop 4/19/20 at 21:59;  Status DC


Potassium Chloride/Water 100 ml @  100 mls/hr 1X  ONCE IV  Last administered on 

4/18/20at 17:28;  Start 4/18/20 at 14:45;  Stop 4/18/20 at 15:44;  Status DC


Sodium Chloride 100 meq/Potassium Chloride 40 meq/ Magnesium Sulfate 15 

meq/Calcium Gluconate 15 meq/ Multivitamins 10 ml/Chromium/ Copper/Manganese/ 

Seleni/Zn 0.5 ml/ Insulin Human Regular 35 unit/ Total Parenteral 

Nutrition/Amino Acids/Dextrose/ Fat Emulsion Intravenous 1,400 ml @  58.333 mls/

hr TPN  CONT IV  Last administered on 4/19/20at 22:46;  Start 4/19/20 at 22:00; 

Stop 4/20/20 at 21:59;  Status DC


Sodium Chloride 100 meq/Potassium Chloride 40 meq/ Magnesium Sulfate 20 

meq/Calcium Gluconate 15 meq/ Multivitamins 10 ml/Chromium/ Copper/Manganese/ 

Seleni/Zn 0.5 ml/ Insulin Human Regular 35 unit/ Total Parenteral 

Nutrition/Amino Acids/Dextrose/ Fat Emulsion Intravenous 1,400 ml @  58.333 mls/

hr TPN  CONT IV  Last administered on 4/20/20at 22:31;  Start 4/20/20 at 22:00; 

Stop 4/21/20 at 21:59;  Status DC


Fentanyl Citrate (Fentanyl 2ml Vial) 50 mcg PRN Q2HR  PRN IVP PAIN Last 

administered on 4/27/20at 13:32;  Start 4/20/20 at 21:00;  Stop 4/28/20 at 

12:53;  Status DC


Fentanyl Citrate (Fentanyl 2ml Vial) 25 mcg PRN Q2HR  PRN IVP PAIN;  Start 

4/20/20 at 21:00;  Stop 4/28/20 at 12:54;  Status DC


Enoxaparin Sodium (Lovenox 100mg Syringe) 100 mg Q12HR SQ ;  Start 4/21/20 at 

21:00;  Status UNV


Amino Acids/ Glycerin/ Electrolytes 1,000 ml @  75 mls/hr [id number] IV ;  Start 

4/20/20 at 21:15;  Status UNV


Sodium Chloride 1,000 ml @  1,000 mls/hr Q1H PRN IV hypotension;  Start 4/21/20 

at 07:56;  Stop 4/21/20 at 13:55;  Status DC


Albumin Human 200 ml @  200 mls/hr 1X PRN  PRN IV Hypotension Last administered 

on 4/21/20at 08:40;  Start 4/21/20 at 08:00;  Stop 4/21/20 at 13:59;  Status DC


Sodium Chloride 1,000 ml @  400 mls/hr Q2H30M PRN IV PATENCY;  Start 4/21/20 at 

07:56;  Stop 4/21/20 at 19:55;  Status DC


Info (PHARMACY MONITORING -- do not chart) 1 each PRN DAILY  PRN MC SEE 

COMMENTS;  Start 4/21/20 at 08:00;  Status UNV


Info (PHARMACY MONITORING -- do not chart) 1 each PRN DAILY  PRN MC SEE 

COMMENTS;  Start 4/21/20 at 08:00;  Status UNV


Daptomycin 430 mg/ Sodium Chloride 50 ml @  100 mls/hr Q24H IV  Last 

administered on 4/21/20at 12:35;  Start 4/21/20 at 09:00;  Stop 4/21/20 at 

12:49;  Status DC


Sodium Chloride 100 meq/Potassium Chloride 40 meq/ Magnesium Sulfate 20 

meq/Calcium Gluconate 15 meq/ Multivitamins 10 ml/Chromium/ Copper/Manganese/ 

Seleni/Zn 0.5 ml/ Insulin Human Regular 35 unit/ Total Parenteral 

Nutrition/Amino Acids/Dextrose/ Fat Emulsion Intravenous 1,400 ml @  58.333 mls/

hr TPN  CONT IV  Last administered on 4/21/20at 21:26;  Start 4/21/20 at 22:00; 

Stop 4/22/20 at 21:59;  Status DC


Daptomycin 430 mg/ Sodium Chloride 50 ml @  100 mls/hr Q48H IV ;  Start 4/23/20 

at 09:00;  Stop 4/22/20 at 11:55;  Status DC


Sodium Chloride 100 meq/Potassium Chloride 40 meq/ Magnesium Sulfate 20 

meq/Calcium Gluconate 15 meq/ Multivitamins 10 ml/Chromium/ Copper/Manganese/ 

Seleni/Zn 0.5 ml/ Insulin Human Regular 35 unit/ Total Parenteral 

Nutrition/Amino Acids/Dextrose/ Fat Emulsion Intravenous 1,400 ml @  58.333 mls/

hr TPN  CONT IV  Last administered on 4/22/20at 22:27;  Start 4/22/20 at 22:00; 

Stop 4/23/20 at 21:59;  Status DC


Daptomycin 430 mg/ Sodium Chloride 50 ml @  100 mls/hr Q24H IV  Last 

administered on 4/24/20at 15:07;  Start 4/22/20 at 13:00;  Stop 4/25/20 at 

13:15;  Status DC


Sodium Chloride 100 meq/Potassium Chloride 40 meq/ Magnesium Sulfate 20 

meq/Calcium Gluconate 10 meq/ Multivitamins 10 ml/Chromium/ Copper/Manganese/ 

Seleni/Zn 0.5 ml/ Insulin Human Regular 35 unit/ Total Parenteral 

Nutrition/Amino Acids/Dextrose/ Fat Emulsion Intravenous 1,400 ml @  58.333 mls/

hr TPN  CONT IV  Last administered on 4/24/20at 00:06;  Start 4/23/20 at 22:00; 

Stop 4/24/20 at 21:59;  Status DC


Alteplase, Recombinant (Cathflo For Central Catheter Clearance) 1 mg 1X  ONCE I

NT CAT  Last administered on 4/24/20at 11:44;  Start 4/24/20 at 10:45;  Stop 

4/24/20 at 10:46;  Status DC


Ondansetron HCl (Zofran) 4 mg PRN Q6HRS  PRN IV NAUSEA/VOMITING;  Start 4/27/20 

at 07:00;  Stop 4/28/20 at 06:59;  Status DC


Fentanyl Citrate (Fentanyl 2ml Vial) 25 mcg PRN Q5MIN  PRN IV MILD PAIN 1-3;  

Start 4/27/20 at 07:00;  Stop 4/28/20 at 06:59;  Status DC


Fentanyl Citrate (Fentanyl 2ml Vial) 50 mcg PRN Q5MIN  PRN IV MODERATE TO SEVERE

PAIN Last administered on 4/27/20at 10:17;  Start 4/27/20 at 07:00;  Stop 

4/28/20 at 06:59;  Status DC


Ringer's Solution 1,000 ml @  30 mls/hr Q24H IV ;  Start 4/27/20 at 07:00;  Stop

4/27/20 at 18:59;  Status DC


Lidocaine HCl (Xylocaine-Mpf 1% 2ml Vial) 2 ml PRN 1X  PRN ID PRIOR TO IV START;

 Start 4/27/20 at 07:00;  Stop 4/28/20 at 06:59;  Status DC


Prochlorperazine Edisylate (Compazine) 5 mg PACU PRN  PRN IV NAUSEA, MRX1;  

Start 4/27/20 at 07:00;  Stop 4/28/20 at 06:59;  Status DC


Sodium Acetate 50 meq/Potassium Acetate 55 meq/ Magnesium Sulfate 20 meq/Calcium

Gluconate 10 meq/ Multivitamins 10 ml/Chromium/ Copper/Manganese/ Seleni/Zn 0.5 

ml/ Insulin Human Regular 35 unit/ Total Parenteral Nutrition/Amino 

Acids/Dextrose/ Fat Emulsion Intravenous 1,400 ml @  58.333 mls/ hr TPN  CONT IV

;  Start 4/24/20 at 22:00;  Stop 4/24/20 at 14:15;  Status DC


Sodium Acetate 50 meq/Potassium Acetate 55 meq/ Magnesium Sulfate 20 meq/Calcium

Gluconate 10 meq/ Multivitamins 10 ml/Chromium/ Copper/Manganese/ Seleni/Zn 0.5 

ml/ Insulin Human Regular 35 unit/ Total Parenteral Nutrition/Amino 

Acids/Dextrose/ Fat Emulsion Intravenous 1,800 ml @  75 mls/hr TPN  CONT IV  

Last administered on 4/24/20at 22:38;  Start 4/24/20 at 22:00;  Stop 4/25/20 at 

21:59;  Status DC


Sodium Chloride 1,000 ml @  1,000 mls/hr Q1H PRN IV hypotension;  Start 4/24/20 

at 15:31;  Stop 4/24/20 at 21:30;  Status DC


Diphenhydramine HCl (Benadryl) 25 mg 1X PRN  PRN IV ITCHING;  Start 4/24/20 at 

15:45;  Stop 4/25/20 at 15:44;  Status DC


Diphenhydramine HCl (Benadryl) 25 mg 1X PRN  PRN IV ITCHING;  Start 4/24/20 at 

15:45;  Stop 4/25/20 at 15:44;  Status DC


Sodium Chloride 1,000 ml @  400 mls/hr Q2H30M PRN IV PATENCY;  Start 4/24/20 at 

15:31;  Stop 4/25/20 at 03:30;  Status DC


Info (PHARMACY MONITORING -- do not chart) 1 each PRN DAILY  PRN MC SEE 

COMMENTS;  Start 4/24/20 at 15:45;  Stop 5/26/20 at 14:14;  Status DC


Sodium Acetate 50 meq/Potassium Acetate 55 meq/ Magnesium Sulfate 20 meq/Calcium

Gluconate 10 meq/ Multivitamins 10 ml/Chromium/ Copper/Manganese/ Seleni/Zn 0.5 

ml/ Insulin Human Regular 35 unit/ Total Parenteral Nutrition/Amino 

Acids/Dextrose/ Fat Emulsion Intravenous 1,800 ml @  75 mls/hr TPN  CONT IV  

Last administered on 4/25/20at 22:03;  Start 4/25/20 at 22:00;  Stop 4/26/20 at 

21:59;  Status DC


Daptomycin 430 mg/ Sodium Chloride 50 ml @  100 mls/hr Q24H IV  Last 

administered on 4/30/20at 13:00;  Start 4/25/20 at 13:00;  Stop 4/30/20 at 

20:58;  Status DC


Heparin Sodium (Porcine) 1000 unit/Sodium Chloride 1,001 ml @  1,001 mls/hr 1X  

ONCE IRR ;  Start 4/27/20 at 06:00;  Stop 4/27/20 at 06:59;  Status DC


Potassium Acetate 55 meq/Magnesium Sulfate 20 meq/ Calcium Gluconate 10 meq/ 

Multivitamins 10 ml/Chromium/ Copper/Manganese/ Seleni/Zn 0.5 ml/ Insulin Human 

Regular 35 unit/ Total Parenteral Nutrition/Amino Acids/Dextrose/ Fat Emulsion 

Intravenous 1,920 ml @  80 mls/hr TPN  CONT IV  Last administered on 4/26/20at 

22:10;  Start 4/26/20 at 22:00;  Stop 4/27/20 at 21:59;  Status DC


Dexamethasone Sodium Phosphate (Decadron) 4 mg STK-MED ONCE .ROUTE ;  Start 

4/27/20 at 10:56;  Stop 4/27/20 at 10:57;  Status DC


Ondansetron HCl (Zofran) 4 mg STK-MED ONCE .ROUTE ;  Start 4/27/20 at 10:56;  

Stop 4/27/20 at 10:57;  Status DC


Rocuronium Bromide (Zemuron) 50 mg STK-MED ONCE .ROUTE ;  Start 4/27/20 at 

10:56;  Stop 4/27/20 at 10:57;  Status DC


Fentanyl Citrate (Fentanyl 2ml Vial) 100 mcg STK-MED ONCE .ROUTE ;  Start 

4/27/20 at 10:56;  Stop 4/27/20 at 10:57;  Status DC


Bupivacaine HCl/ Epinephrine Bitart (Sensorcain-Epi 0.5%-1:400467 Mpf) 30 ml 

STK-MED ONCE .ROUTE  Last administered on 4/27/20at 12:01;  Start 4/27/20 at 

10:58;  Stop 4/27/20 at 10:58;  Status DC


Cellulose (Surgicel Hemostat 2x14) 1 each STK-MED ONCE .ROUTE ;  Start 4/27/20 

at 10:58;  Stop 4/27/20 at 10:59;  Status DC


Iohexol (Omnipaque 300 Mg/ml) 50 ml STK-MED ONCE .ROUTE ;  Start 4/27/20 at 

10:58;  Stop 4/27/20 at 10:59;  Status DC


Cellulose (Surgicel Hemostat 4x8) 1 each STK-MED ONCE .ROUTE ;  Start 4/27/20 at

10:58;  Stop 4/27/20 at 10:59;  Status DC


Bisacodyl (Dulcolax Supp) 10 mg STK-MED ONCE .ROUTE ;  Start 4/27/20 at 10:59;  

Stop 4/27/20 at 10:59;  Status DC


Heparin Sodium (Porcine) 1000 unit/Sodium Chloride 1,001 ml @  1,001 mls/hr 1X  

ONCE IRR ;  Start 4/27/20 at 12:00;  Stop 4/27/20 at 12:59;  Status DC


Propofol 20 ml @ As Directed STK-MED ONCE IV ;  Start 4/27/20 at 11:05;  Stop 

4/27/20 at 11:05;  Status DC


Sevoflurane (Ultane) 90 ml STK-MED ONCE IH ;  Start 4/27/20 at 11:05;  Stop 

4/27/20 at 11:05;  Status DC


Sevoflurane (Ultane) 60 ml STK-MED ONCE IH ;  Start 4/27/20 at 12:26;  Stop 

4/27/20 at 12:27;  Status DC


Propofol 20 ml @ As Directed STK-MED ONCE IV ;  Start 4/27/20 at 12:26;  Stop 

4/27/20 at 12:27;  Status DC


Phenylephrine HCl (PHENYLEPHRINE in 0.9% NACL PF) 1 mg STK-MED ONCE IV ;  Start 

4/27/20 at 12:34;  Stop 4/27/20 at 12:34;  Status DC


Heparin Sodium (Porcine) (Heparin Sodium) 5,000 unit Q12HR SQ  Last administered

on 5/6/20at 20:57;  Start 4/27/20 at 21:00;  Stop 5/7/20 at 09:59;  Status DC


Sodium Chloride (Normal Saline Flush) 3 ml QSHIFT  PRN IV AFTER MEDS AND BLOOD 

DRAWS;  Start 4/27/20 at 13:45;  Status Cancel


Naloxone HCl (Narcan) 0.4 mg PRN Q2MIN  PRN IV SEE INSTRUCTIONS Last 

administered on 6/6/20at 15:15;  Start 4/27/20 at 13:45;  Stop 7/1/20 at 16:00; 

Status DC


Sodium Chloride 1,000 ml @  25 mls/hr Q24H IV  Last administered on 5/26/20at 

13:37;  Start 4/27/20 at 13:37;  Stop 5/29/20 at 13:09;  Status DC


Naloxone HCl (Narcan) 0.4 mg PRN Q2MIN  PRN IV SEE INSTRUCTIONS;  Start 4/27/20 

at 14:30;  Status UNV


Sodium Chloride 1,000 ml @  25 mls/hr Q24H IV ;  Start 4/27/20 at 14:30;  Status

UNV


Hydromorphone HCl 30 ml @ 0 mls/hr CONT PRN  PRN IV PER PROTOCOL Last 

administered on 5/2/20at 16:08;  Start 4/27/20 at 14:30;  Stop 5/4/20 at 08:55; 

Status DC


Potassium Acetate 55 meq/Magnesium Sulfate 20 meq/ Calcium Gluconate 10 meq/ 

Multivitamins 10 ml/Chromium/ Copper/Manganese/ Seleni/Zn 0.5 ml/ Insulin Human 

Regular 35 unit/ Total Parenteral Nutrition/Amino Acids/Dextrose/ Fat Emulsion 

Intravenous 1,920 ml @  80 mls/hr TPN  CONT IV  Last administered on 4/27/20at 

22:01;  Start 4/27/20 at 22:00;  Stop 4/28/20 at 21:59;  Status DC


Bumetanide (Bumex) 2 mg [id number] IV  Last administered on 5/1/20at 13:50;  Start 

4/28/20 at 14:00;  Stop 5/2/20 at 14:10;  Status DC


Meropenem 1 gm/ Sodium Chloride 100 ml @  200 mls/hr Q8HRS IV  Last administered

on 5/22/20at 05:53;  Start 4/28/20 at 14:00;  Stop 5/22/20 at 09:31;  Status DC


Potassium Acetate 55 meq/Magnesium Sulfate 20 meq/ Calcium Gluconate 10 meq/ 

Multivitamins 10 ml/Chromium/ Copper/Manganese/ Seleni/Zn 0.5 ml/ Insulin Human 

Regular 35 unit/ Total Parenteral Nutrition/Amino Acids/Dextrose/ Fat Emulsion 

Intravenous 1,920 ml @  80 mls/hr TPN  CONT IV  Last administered on 4/28/20at 

22:02;  Start 4/28/20 at 22:00;  Stop 4/29/20 at 21:59;  Status DC


Hydromorphone HCl (Dilaudid Standard PCA) 12 mg STK-MED ONCE IV ;  Start 4/27/20

at 14:35;  Stop 4/28/20 at 13:53;  Status DC


Artificial Tears (Artificial Tears) 1 drop PRN Q15MIN  PRN OU DRY EYE Last 

administered on 6/23/20at 21:17;  Start 4/29/20 at 05:30


Hydromorphone HCl (Dilaudid Standard PCA) 12 mg STK-MED ONCE IV ;  Start 4/28/20

at 12:05;  Stop 4/29/20 at 09:15;  Status DC


Potassium Acetate 65 meq/Magnesium Sulfate 20 meq/ Calcium Gluconate 10 meq/ 

Multivitamins 10 ml/Chromium/ Copper/Manganese/ Seleni/Zn 0.5 ml/ Insulin Human 

Regular 30 unit/ Total Parenteral Nutrition/Amino Acids/Dextrose/ Fat Emulsion 

Intravenous 1,920 ml @  80 mls/hr TPN  CONT IV  Last administered on 4/29/20at 

22:22;  Start 4/29/20 at 22:00;  Stop 4/30/20 at 21:59;  Status DC


Cyclobenzaprine HCl (Flexeril) 10 mg PRN Q6HRS  PRN PO MUSCLE SPASMS Last 

administered on 7/10/20at 19:12;  Start 4/30/20 at 10:45


Potassium Acetate 55 meq/Magnesium Sulfate 20 meq/ Calcium Gluconate 10 meq/ 

Multivitamins 10 ml/Chromium/ Copper/Manganese/ Seleni/Zn 0.5 ml/ Insulin Human 

Regular 30 unit/ Total Parenteral Nutrition/Amino Acids/Dextrose/ Fat Emulsion 

Intravenous 1,920 ml @  80 mls/hr TPN  CONT IV  Last administered on 5/1/20at 

01:00;  Start 4/30/20 at 22:00;  Stop 5/1/20 at 21:59;  Status DC


Magnesium Sulfate 50 ml @ 25 mls/hr 1X  ONCE IV  Last administered on 4/30/20at 

17:18;  Start 4/30/20 at 12:45;  Stop 4/30/20 at 14:44;  Status DC


Potassium Chloride/Water 100 ml @  100 mls/hr 1X  ONCE IV  Last administered on 

5/1/20at 11:27;  Start 5/1/20 at 12:00;  Stop 5/1/20 at 12:59;  Status DC


Hydromorphone HCl (Dilaudid Standard PCA) 12 mg STK-MED ONCE IV ;  Start 4/29/20

at 10:50;  Stop 5/1/20 at 11:02;  Status DC


Hydromorphone HCl (Dilaudid Standard PCA) 12 mg STK-MED ONCE IV ;  Start 4/30/20

at 13:47;  Stop 5/1/20 at 11:03;  Status DC


Potassium Acetate 30 meq/Magnesium Sulfate 20 meq/ Calcium Gluconate 10 meq/ 

Multivitamins 10 ml/Chromium/ Copper/Manganese/ Seleni/Zn 0.5 ml/ Insulin Human 

Regular 30 unit/ Potassium Chloride 30 meq/ Total Parenteral Nutrition/Amino 

Acids/Dextrose/ Fat Emulsion Intravenous 1,920 ml @  80 mls/hr TPN  CONT IV  

Last administered on 5/1/20at 22:34;  Start 5/1/20 at 22:00;  Stop 5/2/20 at 

21:59;  Status DC


Potassium Chloride/Water 100 ml @  100 mls/hr Q1H IV  Last administered on 

5/2/20at 13:05;  Start 5/2/20 at 07:00;  Stop 5/2/20 at 10:59;  Status DC


Magnesium Sulfate 50 ml @ 25 mls/hr 1X  ONCE IV  Last administered on 5/2/20at 

10:34;  Start 5/2/20 at 10:30;  Stop 5/2/20 at 12:29;  Status DC


Potassium Chloride 75 meq/ Magnesium Sulfate 20 meq/Calcium Gluconate 10 meq/ 

Multivitamins 10 ml/Chromium/ Copper/Manganese/ Seleni/Zn 0.5 ml/ Insulin Human 

Regular 30 unit/ Total Parenteral Nutrition/Amino Acids/Dextrose/ Fat Emulsion 

Intravenous 1,920 ml @  80 mls/hr TPN  CONT IV  Last administered on 5/2/20at 

21:51;  Start 5/2/20 at 22:00;  Stop 5/3/20 at 22:00;  Status DC


Potassium Chloride 75 meq/ Magnesium Sulfate 20 meq/Calcium Gluconate 10 meq/ 

Multivitamins 10 ml/Chromium/ Copper/Manganese/ Seleni/Zn 0.5 ml/ Insulin Human 

Regular 25 unit/ Total Parenteral Nutrition/Amino Acids/Dextrose/ Fat Emulsion 

Intravenous 1,920 ml @  80 mls/hr TPN  CONT IV  Last administered on 5/3/20at 

22:04;  Start 5/3/20 at 22:00;  Stop 5/4/20 at 21:59;  Status DC


Hydromorphone HCl (Dilaudid) 0.4 mg PRN Q4HRS  PRN IVP PAIN Last administered on

5/4/20at 10:57;  Start 5/4/20 at 09:00;  Stop 5/4/20 at 18:59;  Status DC


Micafungin Sodium 100 mg/Dextrose 100 ml @  100 mls/hr Q24H IV  Last 

administered on 5/26/20at 12:17;  Start 5/4/20 at 11:00;  Stop 5/27/20 at 09:59;

 Status DC


Daptomycin 485 mg/ Sodium Chloride 50 ml @  100 mls/hr Q24H IV  Last 

administered on 5/11/20at 13:10;  Start 5/4/20 at 11:00;  Stop 5/12/20 at 07:44;

 Status DC


Potassium Chloride 75 meq/ Magnesium Sulfate 15 meq/Calcium Gluconate 8 meq/ 

Multivitamins 10 ml/Chromium/ Copper/Manganese/ Seleni/Zn 0.5 ml/ Insulin Human 

Regular 25 unit/ Total Parenteral Nutrition/Amino Acids/Dextrose/ Fat Emulsion 

Intravenous 1,920 ml @  80 mls/hr TPN  CONT IV  Last administered on 5/4/20at 

23:08;  Start 5/4/20 at 22:00;  Stop 5/5/20 at 21:59;  Status DC


Haloperidol Lactate (Haldol Inj) 3 mg 1X  ONCE IVP  Last administered on 

5/4/20at 14:37;  Start 5/4/20 at 14:30;  Stop 5/4/20 at 14:31;  Status DC


Hydromorphone HCl (Dilaudid) 1 mg PRN Q4HRS  PRN IVP PAIN Last administered on 

5/18/20at 06:25;  Start 5/4/20 at 19:00;  Stop 5/18/20 at 17:10;  Status DC


Potassium Chloride 75 meq/ Magnesium Sulfate 15 meq/Calcium Gluconate 8 meq/ 

Multivitamins 10 ml/Chromium/ Copper/Manganese/ Seleni/Zn 0.5 ml/ Insulin Human 

Regular 20 unit/ Total Parenteral Nutrition/Amino Acids/Dextrose/ Fat Emulsion 

Intravenous 1,920 ml @  80 mls/hr TPN  CONT IV  Last administered on 5/5/20at 

22:10;  Start 5/5/20 at 22:00;  Stop 5/6/20 at 21:59;  Status DC


Lidocaine HCl (Buffered Lidocaine 1%) 3 ml STK-MED ONCE .ROUTE ;  Start 5/6/20 

at 11:31;  Stop 5/6/20 at 11:31;  Status DC


Lidocaine HCl (Buffered Lidocaine 1%) 3 ml STK-MED ONCE .ROUTE ;  Start 5/6/20 

at 12:28;  Stop 5/6/20 at 12:29;  Status DC


Lidocaine HCl (Buffered Lidocaine 1%) 6 ml 1X  ONCE INJ  Last administered on 

5/6/20at 12:53;  Start 5/6/20 at 12:45;  Stop 5/6/20 at 12:46;  Status DC


Potassium Chloride 75 meq/ Magnesium Sulfate 15 meq/Calcium Gluconate 8 meq/ 

Multivitamins 10 ml/Chromium/ Copper/Manganese/ Seleni/Zn 0.5 ml/ Insulin Human 

Regular 20 unit/ Total Parenteral Nutrition/Amino Acids/Dextrose/ Fat Emulsion 

Intravenous 1,920 ml @  80 mls/hr TPN  CONT IV  Last administered on 5/6/20at 

22:00;  Start 5/6/20 at 22:00;  Stop 5/7/20 at 21:59;  Status DC


Potassium Chloride 75 meq/ Magnesium Sulfate 15 meq/Calcium Gluconate 8 meq/ 

Multivitamins 10 ml/Chromium/ Copper/Manganese/ Seleni/Zn 0.5 ml/ Insulin Human 

Regular 15 unit/ Total Parenteral Nutrition/Amino Acids/Dextrose/ Fat Emulsion 

Intravenous 1,920 ml @  80 mls/hr TPN  CONT IV  Last administered on 5/7/20at 

22:28;  Start 5/7/20 at 22:00;  Stop 5/8/20 at 21:59;  Status DC


Vecuronium Bromide (Norcuron Bolus) 6 mg PRN Q6HRS  PRN IV VENT ASYNCHRONY;  

Start 5/7/20 at 19:15;  Stop 5/7/20 at 19:35;  Status DC


Bumetanide (Bumex) 2 mg 1X  ONCE IV  Last administered on 5/7/20at 22:09;  Start

5/7/20 at 19:45;  Stop 5/7/20 at 19:46;  Status DC


Lidocaine HCl (Buffered Lidocaine 1%) 3 ml STK-MED ONCE .ROUTE ;  Start 5/8/20 

at 07:59;  Stop 5/8/20 at 07:59;  Status DC


Midazolam HCl (Versed) 5 mg STK-MED ONCE .ROUTE ;  Start 5/8/20 at 08:36;  Stop 

5/8/20 at 08:36;  Status DC


Fentanyl Citrate (Fentanyl 5ml Vial) 250 mcg STK-MED ONCE .ROUTE ;  Start 5/8/20

at 08:36;  Stop 5/8/20 at 08:37;  Status DC


Lidocaine HCl (Buffered Lidocaine 1%) 3 ml 1X  ONCE IJ  Last administered on 

5/8/20at 09:30;  Start 5/8/20 at 09:15;  Stop 5/8/20 at 09:16;  Status DC


Midazolam HCl (Versed) 5 mg 1X  ONCE IV  Last administered on 5/8/20at 09:30;  

Start 5/8/20 at 09:15;  Stop 5/8/20 at 09:16;  Status DC


Fentanyl Citrate (Fentanyl 5ml Vial) 250 mcg 1X  ONCE IV  Last administered on 

5/8/20at 09:30;  Start 5/8/20 at 09:15;  Stop 5/8/20 at 09:16;  Status DC


Bumetanide (Bumex) 2 mg DAILY IV  Last administered on 5/18/20at 08:07;  Start 

5/8/20 at 10:00;  Stop 5/18/20 at 17:15;  Status DC


Potassium Chloride 75 meq/ Magnesium Sulfate 15 meq/ Multivitamins 10 

ml/Chromium/ Copper/Manganese/ Seleni/Zn 0.5 ml/ Insulin Human Regular 15 unit/ 

Total Parenteral Nutrition/Amino Acids/Dextrose/ Fat Emulsion Intravenous 1,920 

ml @  80 mls/hr TPN  CONT IV  Last administered on 5/8/20at 21:59;  Start 5/8/20

at 22:00;  Stop 5/9/20 at 21:59;  Status DC


Metoclopramide HCl (Reglan Vial) 10 mg PRN Q3HRS  PRN IVP NAUSEA/VOMITING-3rd 

choice Last administered on 5/14/20at 04:25;  Start 5/9/20 at 16:45


Potassium Chloride 75 meq/ Magnesium Sulfate 15 meq/ Multivitamins 10 

ml/Chromium/ Copper/Manganese/ Seleni/Zn 0.5 ml/ Insulin Human Regular 15 unit/ 

Total Parenteral Nutrition/Amino Acids/Dextrose/ Fat Emulsion Intravenous 1,920 

ml @  80 mls/hr TPN  CONT IV  Last administered on 5/9/20at 22:41;  Start 5/9/20

at 22:00;  Stop 5/10/20 at 21:59;  Status DC


Magnesium Sulfate 50 ml @ 25 mls/hr 1X  ONCE IV  Last administered on 5/10/20at 

10:44;  Start 5/10/20 at 09:00;  Stop 5/10/20 at 10:59;  Status DC


Potassium Chloride/Water 100 ml @  100 mls/hr 1X  ONCE IV  Last administered on 

5/10/20at 09:37;  Start 5/10/20 at 09:00;  Stop 5/10/20 at 09:59;  Status DC


Duloxetine HCl (Cymbalta) 30 mg DAILY PO  Last administered on 5/11/20at 09:48; 

Start 5/10/20 at 14:00;  Stop 5/13/20 at 10:25;  Status DC


Potassium Chloride 80 meq/ Magnesium Sulfate 20 meq/ Multivitamins 10 

ml/Chromium/ Copper/Manganese/ Seleni/Zn 0.5 ml/ Insulin Human Regular 15 unit/ 

Total Parenteral Nutrition/Amino Acids/Dextrose/ Fat Emulsion Intravenous 1,920 

ml @  80 mls/hr TPN  CONT IV  Last administered on 5/10/20at 21:42;  Start 

5/10/20 at 22:00;  Stop 5/11/20 at 21:59;  Status DC


Potassium Chloride 80 meq/ Magnesium Sulfate 20 meq/ Multivitamins 10 

ml/Chromium/ Copper/Manganese/ Seleni/Zn 0.5 ml/ Insulin Human Regular 15 unit/ 

Total Parenteral Nutrition/Amino Acids/Dextrose/ Fat Emulsion Intravenous 1,920 

ml @  80 mls/hr TPN  CONT IV  Last administered on 5/11/20at 22:20;  Start 

5/11/20 at 22:00;  Stop 5/12/20 at 21:59;  Status DC


Lidocaine HCl (Buffered Lidocaine 1%) 3 ml STK-MED ONCE .ROUTE ;  Start 5/12/20 

at 09:54;  Stop 5/12/20 at 09:55;  Status DC


Hydromorphone HCl (Dilaudid Standard PCA) 12 mg STK-MED ONCE IV ;  Start 5/1/20 

at 15:50;  Stop 5/12/20 at 11:24;  Status DC


Potassium Chloride 80 meq/ Magnesium Sulfate 20 meq/ Multivitamins 10 

ml/Chromium/ Copper/Manganese/ Seleni/Zn 0.5 ml/ Insulin Human Regular 15 unit/ 

Total Parenteral Nutrition/Amino Acids/Dextrose/ Fat Emulsion Intravenous 1,920 

ml @  80 mls/hr TPN  CONT IV  Last administered on 5/12/20at 21:40;  Start 

5/12/20 at 22:00;  Stop 5/13/20 at 21:59;  Status DC


Lidocaine HCl (Buffered Lidocaine 1%) 6 ml 1X  ONCE INJ  Last administered on 

5/12/20at 14:15;  Start 5/12/20 at 14:15;  Stop 5/12/20 at 14:16;  Status DC


Potassium Chloride 80 meq/ Magnesium Sulfate 20 meq/ Multivitamins 10 

ml/Chromium/ Copper/Manganese/ Seleni/Zn 1 ml/ Insulin Human Regular 15 unit/ To

chely Parenteral Nutrition/Amino Acids/Dextrose/ Fat Emulsion Intravenous 1,920 ml

@  80 mls/hr TPN  CONT IV  Last administered on 5/13/20at 22:04;  Start 5/13/20 

at 22:00;  Stop 5/14/20 at 21:59;  Status DC


Potassium Chloride/Water 100 ml @  100 mls/hr 1X  ONCE IV  Last administered on 

5/14/20at 11:34;  Start 5/14/20 at 11:00;  Stop 5/14/20 at 11:59;  Status DC


Potassium Chloride 90 meq/ Magnesium Sulfate 20 meq/ Multivitamins 10 

ml/Chromium/ Copper/Manganese/ Seleni/Zn 1 ml/ Insulin Human Regular 15 unit/ T

otal Parenteral Nutrition/Amino Acids/Dextrose/ Fat Emulsion Intravenous 1,920 

ml @  80 mls/hr TPN  CONT IV  Last administered on 5/14/20at 22:57;  Start 

5/14/20 at 22:00;  Stop 5/15/20 at 21:59;  Status DC


Potassium Chloride 90 meq/ Magnesium Sulfate 20 meq/ Multivitamins 10 

ml/Chromium/ Copper/Manganese/ Seleni/Zn 1 ml/ Insulin Human Regular 15 unit/ 

Total Parenteral Nutrition/Amino Acids/Dextrose/ Fat Emulsion Intravenous 1,920 

ml @  80 mls/hr TPN  CONT IV  Last administered on 5/15/20at 22:48;  Start 5/

15/20 at 22:00;  Stop 5/16/20 at 21:59;  Status DC


Potassium Chloride 90 meq/ Magnesium Sulfate 20 meq/ Multivitamins 10 ml/

mium/ Copper/Manganese/ Seleni/Zn 1 ml/ Insulin Human Regular 15 unit/ Total 

Parenteral Nutrition/Amino Acids/Dextrose/ Fat Emulsion Intravenous 1,890 ml @  

78.75 mls/ hr TPN  CONT IV  Last administered on 5/16/20at 22:15;  Start 5/16/20

at 22:00;  Stop 5/17/20 at 21:59;  Status DC


Linezolid/Dextrose 300 ml @  300 mls/hr Q12HR IV  Last administered on 5/19/20at

21:08;  Start 5/17/20 at 09:00;  Stop 5/20/20 at 08:11;  Status DC


Daptomycin 450 mg/ Sodium Chloride 50 ml @  100 mls/hr Q24H IV  Last 

administered on 5/20/20at 09:25;  Start 5/17/20 at 09:00;  Stop 5/21/20 at 

08:30;  Status DC


Potassium Chloride 90 meq/ Magnesium Sulfate 20 meq/ Multivitamins 10 

ml/Chromium/ Copper/Manganese/ Seleni/Zn 1 ml/ Insulin Human Regular 15 unit/ 

Total Parenteral Nutrition/Amino Acids/Dextrose/ Fat Emulsion Intravenous 1,890 

ml @  78.75 mls/ hr TPN  CONT IV  Last administered on 5/17/20at 21:34;  Start 

5/17/20 at 22:00;  Stop 5/18/20 at 21:59;  Status DC


Lorazepam (Ativan Inj) 2 mg STK-MED ONCE .ROUTE ;  Start 5/17/20 at 14:58;  Stop

5/17/20 at 14:58;  Status DC


Metoprolol Tartrate (Lopressor Vial) 5 mg 1X  ONCE IVP  Last administered on 

5/17/20at 15:31;  Start 5/17/20 at 15:15;  Stop 5/17/20 at 15:16;  Status DC


Lorazepam (Ativan Inj) 2 mg 1X  ONCE IVP  Last administered on 5/17/20at 15:30; 

Start 5/17/20 at 15:15;  Stop 5/17/20 at 15:16;  Status DC


Enoxaparin Sodium (Lovenox 40mg Syringe) 40 mg Q24H SQ  Last administered on 

6/5/20at 17:44;  Start 5/17/20 at 17:00;  Stop 6/7/20 at 06:50;  Status DC


Lorazepam (Ativan Inj) 1 mg PRN Q4HRS  PRN IVP ANXIETY / AGITATION MILD-MOD Last

administered on 5/31/20at 15:55;  Start 5/17/20 at 19:15;  Stop 6/2/20 at 11:45;

 Status DC


Lorazepam (Ativan Inj) 2 mg PRN Q4HRS  PRN IVP ANXIETY / AGITATION SEVERE Last 

administered on 6/1/20at 07:55;  Start 5/17/20 at 19:15;  Stop 6/2/20 at 11:45; 

Status DC


Fentanyl Citrate (Fentanyl 2ml Vial) 50 mcg PRN Q4HRS  PRN IVP SEVERE PAIN Last 

administered on 6/13/20at 05:15;  Start 5/18/20 at 13:15;  Stop 6/14/20 at 

09:29;  Status DC


Fentanyl Citrate (Fentanyl 2ml Vial) 25 mcg PRN Q4HRS  PRN IVP MODERATE PAIN 

Last administered on 6/13/20at 00:27;  Start 5/18/20 at 13:15;  Stop 6/14/20 at 

09:30;  Status DC


Potassium Chloride 90 meq/ Magnesium Sulfate 20 meq/ Multivitamins 10 

ml/Chromium/ Copper/Manganese/ Seleni/Zn 1 ml/ Insulin Human Regular 15 unit/ 

Total Parenteral Nutrition/Amino Acids/Dextrose/ Fat Emulsion Intravenous 1,890 

ml @  78.75 mls/ hr TPN  CONT IV  Last administered on 5/18/20at 22:18;  Start 

5/18/20 at 22:00;  Stop 5/19/20 at 21:59;  Status DC


Furosemide (Lasix) 40 mg 1X  ONCE IVP  Last administered on 5/18/20at 21:51;  

Start 5/18/20 at 21:45;  Stop 5/18/20 at 21:48;  Status DC


Albumin Human 100 ml @  100 mls/hr 1X PRN  PRN IV SEE COMMENTS;  Start 5/19/20 

at 01:30


Furosemide (Lasix) 40 mg [id number] IVP  Last administered on 6/3/20at 08:04;  Start 

5/19/20 at 14:00;  Stop 6/3/20 at 13:07;  Status DC


Potassium Chloride 90 meq/ Magnesium Sulfate 20 meq/ Multivitamins 10 

ml/Chromium/ Copper/Manganese/ Seleni/Zn 1 ml/ Insulin Human Regular 15 unit/ 

Total Parenteral Nutrition/Amino Acids/Dextrose/ Fat Emulsion Intravenous 1,800 

ml @  75 mls/hr TPN  CONT IV  Last administered on 5/19/20at 22:31;  Start 

5/19/20 at 22:00;  Stop 5/20/20 at 21:59;  Status DC


Potassium Chloride 90 meq/ Magnesium Sulfate 20 meq/ Multivitamins 10 

ml/Chromium/ Copper/Manganese/ Seleni/Zn 1 ml/ Insulin Human Regular 15 unit/ 

Total Parenteral Nutrition/Amino Acids/Dextrose/ Fat Emulsion Intravenous 1,800 

ml @  75 mls/hr TPN  CONT IV  Last administered on 5/20/20at 22:28;  Start 

5/20/20 at 22:00;  Stop 5/21/20 at 21:59;  Status DC


Potassium Chloride 110 meq/ Magnesium Sulfate 20 meq/ Multivitamins 10 

ml/Chromium/ Copper/Manganese/ Seleni/Zn 1 ml/ Insulin Human Regular 15 unit/ 

Total Parenteral Nutrition/Amino Acids/Dextrose/ Fat Emulsion Intravenous 1,800 

ml @  75 mls/hr TPN  CONT IV  Last administered on 5/21/20at 22:01;  Start 

5/21/20 at 22:00;  Stop 5/22/20 at 21:59;  Status DC


Saliva Substitute (Biotene Moisturizing Mouth) 2 spray PRN Q15MIN  PRN PO DRY 

MOUTH;  Start 5/21/20 at 11:00


Potassium Chloride 110 meq/ Magnesium Sulfate 20 meq/ Multivitamins 10 

ml/Chromium/ Copper/Manganese/ Seleni/Zn 1 ml/ Insulin Human Regular 15 unit/ 

Total Parenteral Nutrition/Amino Acids/Dextrose/ Fat Emulsion Intravenous 1,800 

ml @  75 mls/hr TPN  CONT IV  Last administered on 5/22/20at 22:21;  Start 

5/22/20 at 22:00;  Stop 5/23/20 at 21:59;  Status DC


Potassium Chloride 110 meq/ Magnesium Sulfate 20 meq/ Multivitamins 10 

ml/Chromium/ Copper/Manganese/ Seleni/Zn 1 ml/ Insulin Human Regular 15 unit/ 

Total Parenteral Nutrition/Amino Acids/Dextrose/ Fat Emulsion Intravenous 1,800 

ml @  75 mls/hr TPN  CONT IV  Last administered on 5/23/20at 22:04;  Start 

5/23/20 at 22:00;  Stop 5/24/20 at 21:59;  Status DC


Potassium Chloride 110 meq/ Magnesium Sulfate 20 meq/ Multivitamins 10 

ml/Chromium/ Copper/Manganese/ Seleni/Zn 1 ml/ Insulin Human Regular 15 unit/ 

Total Parenteral Nutrition/Amino Acids/Dextrose/ Fat Emulsion Intravenous 1,800 

ml @  75 mls/hr TPN  CONT IV  Last administered on 5/24/20at 22:48;  Start 

5/24/20 at 22:00;  Stop 5/25/20 at 21:59;  Status DC


Potassium Chloride 70 meq/ Magnesium Sulfate 20 meq/ Multivitamins 10 

ml/Chromium/ Copper/Manganese/ Seleni/Zn 1 ml/ Insulin Human Regular 15 unit/ 

Total Parenteral Nutrition/Amino Acids/Dextrose/ Fat Emulsion Intravenous 1,800 

ml @  75 mls/hr TPN  CONT IV  Last administered on 5/25/20at 21:39;  Start 

5/25/20 at 22:00;  Stop 5/26/20 at 21:59;  Status DC


Meropenem 500 mg/ Sodium Chloride 50 ml @  100 mls/hr Q6HRS IV  Last 

administered on 5/27/20at 06:02;  Start 5/25/20 at 18:00;  Stop 5/27/20 at 

09:59;  Status DC


Barium Sulfate (Varibar Thin Liquid Apple) 148 gm 1X  ONCE PO ;  Start 5/26/20 

at 11:45;  Stop 5/26/20 at 11:49;  Status DC


Potassium Chloride 70 meq/ Magnesium Sulfate 20 meq/ Multivitamins 10 ml/Chromiu

m/ Copper/Manganese/ Seleni/Zn 1 ml/ Insulin Human Regular 15 unit/ Total 

Parenteral Nutrition/Amino Acids/Dextrose/ Fat Emulsion Intravenous 1,800 ml @  

75 mls/hr TPN  CONT IV  Last administered on 5/26/20at 22:27;  Start 5/26/20 at 

22:00;  Stop 5/27/20 at 21:59;  Status DC


Piperacillin Sod/ Tazobactam Sod 3.375 gm/Sodium Chloride 50 ml @  100 mls/hr 

Q6HRS IV  Last administered on 6/4/20at 06:10;  Start 5/27/20 at 12:00;  Stop 

6/4/20 at 07:26;  Status DC


Potassium Chloride 70 meq/ Magnesium Sulfate 20 meq/ Multivitamins 10 

ml/Chromium/ Copper/Manganese/ Seleni/Zn 1 ml/ Insulin Human Regular 15 unit/ 

Total Parenteral Nutrition/Amino Acids/Dextrose/ Fat Emulsion Intravenous 1,800 

ml @  75 mls/hr TPN  CONT IV  Last administered on 5/27/20at 22:03;  Start 5/27/ 20 at 22:00;  Stop 5/28/20 at 21:59;  Status DC


Potassium Chloride 70 meq/ Magnesium Sulfate 20 meq/ Multivitamins 10 ml/Chromiu

m/ Copper/Manganese/ Seleni/Zn 1 ml/ Insulin Human Regular 15 unit/ Total 

Parenteral Nutrition/Amino Acids/Dextrose/ Fat Emulsion Intravenous 1,800 ml @  

75 mls/hr TPN  CONT IV  Last administered on 5/28/20at 22:33;  Start 5/28/20 at 

22:00;  Stop 5/29/20 at 21:59;  Status DC


Potassium Chloride 70 meq/ Magnesium Sulfate 20 meq/ Multivitamins 10 

ml/Chromium/ Copper/Manganese/ Seleni/Zn 1 ml/ Insulin Human Regular 15 unit/ 

Total Parenteral Nutrition/Amino Acids/Dextrose/ Fat Emulsion Intravenous 1,800 

ml @  75 mls/hr TPN  CONT IV  Last administered on 5/29/20at 23:13;  Start 

5/29/20 at 22:00;  Stop 5/30/20 at 21:59;  Status DC


Potassium Chloride 80 meq/ Magnesium Sulfate 20 meq/ Multivitamins 10 

ml/Chromium/ Copper/Manganese/ Seleni/Zn 1 ml/ Insulin Human Regular 15 unit/ 

Total Parenteral Nutrition/Amino Acids/Dextrose/ Fat Emulsion Intravenous 1,800 

ml @  75 mls/hr TPN  CONT IV  Last administered on 5/30/20at 22:30;  Start 

5/30/20 at 22:00;  Stop 5/31/20 at 21:59;  Status DC


Potassium Chloride 80 meq/ Magnesium Sulfate 20 meq/ Multivitamins 10 

ml/Chromium/ Copper/Manganese/ Seleni/Zn 1 ml/ Insulin Human Regular 15 unit/ 

Total Parenteral Nutrition/Amino Acids/Dextrose/ Fat Emulsion Intravenous 1,800 

ml @  75 mls/hr TPN  CONT IV  Last administered on 5/31/20at 21:54;  Start 

5/31/20 at 22:00;  Stop 6/1/20 at 21:59;  Status DC


Potassium Chloride/Water 100 ml @  100 mls/hr 1X  ONCE IV  Last administered on 

6/1/20at 10:15;  Start 6/1/20 at 10:00;  Stop 6/1/20 at 10:59;  Status DC


Potassium Chloride 90 meq/ Magnesium Sulfate 20 meq/ Multivitamins 10 

ml/Chromium/ Copper/Manganese/ Seleni/Zn 1 ml/ Insulin Human Regular 20 unit/ 

Total Parenteral Nutrition/Amino Acids/Dextrose/ Fat Emulsion Intravenous 1,800 

ml @  75 mls/hr TPN  CONT IV  Last administered on 6/1/20at 22:28;  Start 6/1/20

at 22:00;  Stop 6/2/20 at 21:59;  Status DC


Potassium Chloride 90 meq/ Magnesium Sulfate 20 meq/ Multivitamins 10 

ml/Chromium/ Copper/Manganese/ Seleni/Zn 1 ml/ Insulin Human Regular 20 unit/ 

Total Parenteral Nutrition/Amino Acids/Dextrose/ Fat Emulsion Intravenous 1,800 

ml @  75 mls/hr TPN  CONT IV  Last administered on 6/2/20at 22:08;  Start 6/2/20

at 22:00;  Stop 6/3/20 at 21:59;  Status DC


Lorazepam (Ativan Inj) 0.25 mg PRN Q4HRS  PRN IVP ANXIETY / AGITATION Last 

administered on 7/12/20at 00:27;  Start 6/3/20 at 07:30


Potassium Chloride 90 meq/ Magnesium Sulfate 20 meq/ Multivitamins 10 

ml/Chromium/ Copper/Manganese/ Seleni/Zn 1 ml/ Insulin Human Regular 20 unit/ 

Total Parenteral Nutrition/Amino Acids/Dextrose/ Fat Emulsion Intravenous 1,800 

ml @  75 mls/hr TPN  CONT IV  Last administered on 6/3/20at 23:13;  Start 6/3/20

at 22:00;  Stop 6/4/20 at 21:59;  Status DC


Furosemide (Lasix) 40 mg DAILY IVP  Last administered on 6/5/20at 11:14;  Start 

6/3/20 at 13:30;  Stop 6/7/20 at 09:12;  Status DC


Fluoxetine HCl (PROzac) 20 mg QHS PEG  Last administered on 7/16/20at 21:05;  

Start 6/4/20 at 21:00


Fentanyl (Duragesic 50mcg/ Hr Patch) 1 patch Q72H TD  Last administered on 

6/4/20at 21:22;  Start 6/4/20 at 21:00;  Stop 6/13/20 at 12:00;  Status DC


Potassium Chloride 40 meq/ Potassium Acetate 60 meq/Magnesium Sulfate 10 meq/ 

Multivitamins 10 ml/Chromium/ Copper/Manganese/ Seleni/Zn 1 ml/ Insulin Human 

Regular 20 unit/ Total Parenteral Nutrition/Amino Acids/Dextrose/ Fat Emulsion 

Intravenous 1,800 ml @  75 mls/hr TPN  CONT IV  Last administered on 6/5/20at 

00:03;  Start 6/4/20 at 22:00;  Stop 6/5/20 at 21:59;  Status DC


Potassium Acetate 80 meq/Magnesium Sulfate 5 meq/ Multivitamins 10 ml/Chromium/ 

Copper/Manganese/ Seleni/Zn 1 ml/ Insulin Human Regular 20 unit/ Total 

Parenteral Nutrition/Amino Acids/Dextrose/ Fat Emulsion Intravenous 1,920 ml @  

80 mls/hr TPN  CONT IV  Last administered on 6/5/20at 21:59;  Start 6/5/20 at 

22:00;  Stop 6/6/20 at 21:59;  Status DC


Potassium Acetate 60 meq/Magnesium Sulfate 5 meq/ Multivitamins 10 ml/Chromium/ 

Copper/Manganese/ Seleni/Zn 1 ml/ Insulin Human Regular 30 unit/ Total 

Parenteral Nutrition/Amino Acids/Dextrose/ Fat Emulsion Intravenous 1,920 ml @  

80 mls/hr TPN  CONT IV  Last administered on 6/6/20at 21:54;  Start 6/6/20 at 

22:00;  Stop 6/7/20 at 21:59;  Status DC


Norepinephrine Bitartrate 8 mg/ Dextrose 258 ml @  13.332 mls/ hr CONT  PRN IV 

PER PROTOCOL Last administered on 7/2/20at 09:09;  Start 6/7/20 at 06:30


Albumin Human 500 ml @  125 mls/hr 1X  ONCE IV  Last administered on 6/7/20at 

08:10;  Start 6/7/20 at 08:15;  Stop 6/7/20 at 12:14;  Status DC


Potassium Acetate 40 meq/Magnesium Sulfate 5 meq/ Multivitamins 10 ml/Chromium/ 

Copper/Manganese/ Seleni/Zn 1 ml/ Insulin Human Regular 30 unit/ Total 

Parenteral Nutrition/Amino Acids/Dextrose/ Fat Emulsion Intravenous 1,920 ml @  

80 mls/hr TPN  CONT IV  Last administered on 6/7/20at 22:23;  Start 6/7/20 at 

22:00;  Stop 6/8/20 at 21:59;  Status DC


Meropenem 1 gm/ Sodium Chloride 100 ml @  200 mls/hr Q8HRS IV ;  Start 6/7/20 at

14:00;  Status Cancel


Meropenem 1 gm/ Sodium Chloride 100 ml @  200 mls/hr Q8HRS IV  Last administered

on 6/7/20at 11:04;  Start 6/7/20 at 10:00;  Stop 6/7/20 at 13:00;  Status DC


Meropenem 1 gm/ Sodium Chloride 100 ml @  200 mls/hr Q12HR IV  Last administered

on 6/25/20at 08:27;  Start 6/7/20 at 21:00;  Stop 6/25/20 at 08:56;  Status DC


Sodium Chloride 1,000 ml @  1,000 mls/hr 1X  ONCE IV  Last administered on 

6/7/20at 11:06;  Start 6/7/20 at 10:45;  Stop 6/7/20 at 11:44;  Status DC


Micafungin Sodium 100 mg/Dextrose 100 ml @  100 mls/hr Q24H IV  Last 

administered on 6/24/20at 12:34;  Start 6/7/20 at 11:00;  Stop 6/25/20 at 08:56;

 Status DC


Daptomycin 410 mg/ Sodium Chloride 50 ml @  100 mls/hr Q24H IV  Last 

administered on 6/9/20at 13:33;  Start 6/7/20 at 14:00;  Stop 6/10/20 at 08:30; 

Status DC


Midazolam HCl (Versed) 2 mg STK-MED ONCE .ROUTE ;  Start 6/7/20 at 14:47;  Stop 

6/7/20 at 14:48;  Status DC


Fentanyl Citrate (Fentanyl 2ml Vial) 100 mcg STK-MED ONCE .ROUTE ;  Start 6/7/20

at 14:47;  Stop 6/7/20 at 14:48;  Status DC


Flumazenil (Romazicon) 0.5 mg STK-MED ONCE IV ;  Start 6/7/20 at 14:48;  Stop 

6/7/20 at 14:48;  Status DC


Naloxone HCl (Narcan) 0.4 mg STK-MED ONCE .ROUTE ;  Start 6/7/20 at 14:48;  Stop

6/7/20 at 14:48;  Status DC


Lidocaine HCl (Lidocaine 1% 20ml Vial) 20 ml STK-MED ONCE .ROUTE ;  Start 6/7/20

at 14:48;  Stop 6/7/20 at 14:48;  Status DC


Midazolam HCl (Versed) 2 mg 1X  ONCE IV  Last administered on 6/7/20at 15:28;  

Start 6/7/20 at 15:00;  Stop 6/7/20 at 15:01;  Status DC


Fentanyl Citrate (Fentanyl 2ml Vial) 100 mcg 1X  ONCE IV  Last administered on 

6/7/20at 15:28;  Start 6/7/20 at 15:00;  Stop 6/7/20 at 15:01;  Status DC


Lidocaine HCl (Lidocaine 1% 20ml Vial) 20 ml 1X  ONCE INJ  Last administered on 

6/7/20at 15:30;  Start 6/7/20 at 15:00;  Stop 6/7/20 at 15:01;  Status DC


Sodium Chloride 1,000 ml @  100 mls/hr Q10H IV  Last administered on 6/16/20at 

07:30;  Start 6/7/20 at 20:00;  Stop 6/16/20 at 11:26;  Status DC


Sodium Bicarbonate (Sodium Bicarb Adult 8.4% Syr) 50 meq 1X  ONCE IV  Last 

administered on 6/7/20at 21:47;  Start 6/7/20 at 22:00;  Stop 6/7/20 at 22:01;  

Status DC


Potassium Acetate 40 meq/Magnesium Sulfate 5 meq/ Multivitamins 10 ml/Chromium/ 

Copper/Manganese/ Seleni/Zn 1 ml/ Insulin Human Regular 30 unit/ Total Parentera

l Nutrition/Amino Acids/Dextrose/ Fat Emulsion Intravenous 1,920 ml @  80 mls/hr

TPN  CONT IV  Last administered on 6/8/20at 22:28;  Start 6/8/20 at 22:00;  Stop

6/9/20 at 21:59;  Status DC


Sodium Chloride 500 ml @  500 mls/hr 1X  ONCE IV  Last administered on 6/9/20at 

06:39;  Start 6/9/20 at 06:45;  Stop 6/9/20 at 07:44;  Status DC


Potassium Acetate 40 meq/Magnesium Sulfate 5 meq/ Multivitamins 10 ml/Chromium/ 

Copper/Manganese/ Seleni/Zn 1 ml/ Insulin Human Regular 30 unit/ Total 

Parenteral Nutrition/Amino Acids/Dextrose/ Fat Emulsion Intravenous 1,920 ml @  

80 mls/hr TPN  CONT IV  Last administered on 6/9/20at 22:03;  Start 6/9/20 at 

22:00;  Stop 6/10/20 at 21:59;  Status DC


Metoprolol Tartrate (Lopressor Vial) 5 mg PRN Q6HRS  PRN IVP HYPERTENSION Last 

administered on 7/12/20at 18:01;  Start 6/10/20 at 09:00


Potassium Acetate 40 meq/Magnesium Sulfate 5 meq/ Multivitamins 10 ml/Chromium/ 

Copper/Manganese/ Seleni/Zn 1 ml/ Insulin Human Regular 30 unit/ Total 

Parenteral Nutrition/Amino Acids/Dextrose/ Fat Emulsion Intravenous 1,920 ml @  

80 mls/hr TPN  CONT IV  Last administered on 6/10/20at 21:26;  Start 6/10/20 at 

22:00;  Stop 6/11/20 at 21:59;  Status DC


Potassium Acetate 40 meq/Magnesium Sulfate 5 meq/ Multivitamins 10 ml/Chromium/ 

Copper/Manganese/ Seleni/Zn 1 ml/ Insulin Human Regular 30 unit/ Total 

Parenteral Nutrition/Amino Acids/Dextrose/ Fat Emulsion Intravenous 1,920 ml @  

80 mls/hr TPN  CONT IV  Last administered on 6/11/20at 23:23;  Start 6/11/20 at 

22:00;  Stop 6/12/20 at 21:59;  Status DC


Potassium Acetate 40 meq/Magnesium Sulfate 5 meq/ Multivitamins 10 ml/Chromium/ 

Copper/Manganese/ Seleni/Zn 1 ml/ Insulin Human Regular 30 unit/ Total 

Parenteral Nutrition/Amino Acids/Dextrose/ Fat Emulsion Intravenous 1,920 ml @  

80 mls/hr TPN  CONT IV  Last administered on 6/12/20at 21:35;  Start 6/12/20 at 

22:00;  Stop 6/13/20 at 21:59;  Status DC


Furosemide (Lasix) 20 mg 1X  ONCE IVP  Last administered on 6/13/20at 06:26;  

Start 6/13/20 at 06:15;  Stop 6/13/20 at 06:16;  Status DC


Methylprednisolone Sodium Succinate (SOLU-Medrol 125MG VIAL) 125 mg 1X  ONCE IV 

Last administered on 6/13/20at 06:26;  Start 6/13/20 at 06:15;  Stop 6/13/20 at 

06:16;  Status DC


Albuterol/ Ipratropium (Duoneb) 3 ml Q4HRS NEB  Last administered on 7/17/20at 

07:28;  Start 6/13/20 at 08:00


Fentanyl Citrate 30 ml @ 0 mls/hr CONT  PRN IV SEE PROTOCOL Last administered on

7/4/20at 08:03;  Start 6/13/20 at 06:00;  Stop 7/4/20 at 12:42;  Status DC


Propofol 100 ml @ 0 mls/hr CONT  PRN IV SEE PROTOCOL Last administered on 

6/20/20at 23:50;  Start 6/13/20 at 06:00


Fentanyl Citrate (Fentanyl 2ml Vial) 25 mcg PRN Q1HR  PRN IV SEE COMMENTS Last 

administered on 7/17/20at 00:40;  Start 6/13/20 at 06:00


Fentanyl Citrate (Fentanyl 2ml Vial) 50 mcg PRN Q1HR  PRN IV SEE COMMENTS Last 

administered on 7/12/20at 18:02;  Start 6/13/20 at 06:00


Chlorhexidine Gluconate (Peridex) 15 ml BID MM ;  Start 6/13/20 at 09:00;  Stop 

6/13/20 at 07:58;  Status DC


Potassium Acetate 40 meq/Magnesium Sulfate 5 meq/ Multivitamins 10 ml/Chromium/ 

Copper/Manganese/ Seleni/Zn 1 ml/ Insulin Human Regular 30 unit/ Total 

Parenteral Nutrition/Amino Acids/Dextrose/ Fat Emulsion Intravenous 1,920 ml @  

80 mls/hr TPN  CONT IV  Last administered on 6/13/20at 21:19;  Start 6/13/20 at 

22:00;  Stop 6/14/20 at 21:59;  Status DC


Acetylcysteine (Mucomyst 20% Resp Treatment) 600 mg BID NEB  Last administered 

on 6/19/20at 09:33;  Start 6/13/20 at 21:00;  Stop 6/19/20 at 10:39;  Status DC


Magnesium Sulfate 100 ml @  25 mls/hr 1X  ONCE IV  Last administered on 

6/13/20at 15:48;  Start 6/13/20 at 15:45;  Stop 6/13/20 at 19:44;  Status DC


Potassium Acetate 40 meq/Magnesium Sulfate 5 meq/ Multivitamins 10 ml/Chromium/ 

Copper/Manganese/ Seleni/Zn 1 ml/ Insulin Human Regular 30 unit/ Total 

Parenteral Nutrition/Amino Acids/Dextrose/ Fat Emulsion Intravenous 1,920 ml @  

80 mls/hr TPN  CONT IV  Last administered on 6/14/20at 21:35;  Start 6/14/20 at 

22:00;  Stop 6/15/20 at 21:59;  Status DC


Potassium Chloride/Water 100 ml @  100 mls/hr Q1H IV  Last administered on 

6/15/20at 08:31;  Start 6/15/20 at 07:00;  Stop 6/15/20 at 08:59;  Status DC


Potassium Acetate 40 meq/Magnesium Sulfate 5 meq/ Multivitamins 10 ml/Chromium/ 

Copper/Manganese/ Seleni/Zn 1 ml/ Insulin Human Regular 30 unit/ Total 

Parenteral Nutrition/Amino Acids/Dextrose/ Fat Emulsion Intravenous 1,920 ml @  

80 mls/hr TPN  CONT IV  Last administered on 6/15/20at 21:54;  Start 6/15/20 at 

22:00;  Stop 6/16/20 at 19:34;  Status DC


Lidocaine HCl (Buffered Lidocaine 1%) 3 ml STK-MED ONCE .ROUTE ;  Start 6/15/20 

at 12:14;  Stop 6/15/20 at 12:14;  Status DC


Lidocaine HCl (Buffered Lidocaine 1%) 3 ml 1X  ONCE IJ  Last administered on 

6/15/20at 13:11;  Start 6/15/20 at 13:00;  Stop 6/15/20 at 13:01;  Status DC


Magnesium Sulfate 50 ml @ 25 mls/hr 1X  ONCE IV ;  Start 6/16/20 at 08:15;  Stop

6/16/20 at 10:14;  Status DC


Potassium Acetate 40 meq/Magnesium Sulfate 10 meq/ Multivitamins 10 ml/Chromium/

Copper/Manganese/ Seleni/Zn 1 ml/ Insulin Human Regular 20 unit/ Total 

Parenteral Nutrition/Amino Acids/Dextrose/ Fat Emulsion Intravenous 1,920 ml @  

80 mls/hr TPN  CONT IV  Last administered on 6/16/20at 21:32;  Start 6/16/20 at 

22:00;  Stop 6/17/20 at 21:59;  Status DC


Potassium Chloride/Water 100 ml @  100 mls/hr Q1H IV  Last administered on 

6/17/20at 09:12;  Start 6/17/20 at 08:00;  Stop 6/17/20 at 09:59;  Status DC


Alteplase, Recombinant (Cathflo For Central Catheter Clearance) 4 mg 1X  ONCE 

INT CAT ;  Start 6/17/20 at 09:15;  Stop 6/17/20 at 09:16;  Status UNV


Alteplase, Recombinant (Cathflo For Central Catheter Clearance) 4 mg 1X  ONCE 

INT CAT ;  Start 6/17/20 at 09:15;  Stop 6/17/20 at 09:16;  Status UNV


Alteplase, Recombinant (Cathflo For Central Catheter Clearance) 4 mg 1X  ONCE 

INT CAT ;  Start 6/17/20 at 09:15;  Stop 6/17/20 at 09:16;  Status UNV


Alteplase, Recombinant 4 mg/ Sodium Chloride 20 ml @ 20 mls/hr 1X  ONCE IV  Last

administered on 6/17/20at 10:10;  Start 6/17/20 at 10:00;  Stop 6/17/20 at 

10:59;  Status DC


Alteplase, Recombinant 4 mg/ Sodium Chloride 20 ml @ 20 mls/hr 1X  ONCE IV  Last

administered on 6/17/20at 10:09;  Start 6/17/20 at 10:00;  Stop 6/17/20 at 

10:59;  Status DC


Alteplase, Recombinant 4 mg/ Sodium Chloride 20 ml @ 20 mls/hr 1X  ONCE IV  Last

administered on 6/17/20at 10:09;  Start 6/17/20 at 10:00;  Stop 6/17/20 at 

10:59;  Status DC


Potassium Acetate 60 meq/Magnesium Sulfate 10 meq/ Multivitamins 10 ml/Chromium/

Copper/Manganese/ Seleni/Zn 1 ml/ Insulin Human Regular 20 unit/ Total 

Parenteral Nutrition/Amino Acids/Dextrose/ Fat Emulsion Intravenous 1,920 ml @  

80 mls/hr TPN  CONT IV  Last administered on 6/17/20at 21:55;  Start 6/17/20 at 

22:00;  Stop 6/18/20 at 21:59;  Status DC


Albumin Human 500 ml @  125 mls/hr 1X  ONCE IV  Last administered on 6/18/20at 

12:01;  Start 6/18/20 at 11:15;  Stop 6/18/20 at 15:14;  Status DC


Sodium Chloride 500 ml @  500 mls/hr 1X  ONCE IV  Last administered on 6/18/20at

13:50;  Start 6/18/20 at 11:15;  Stop 6/18/20 at 12:14;  Status DC


Potassium Acetate 60 meq/Magnesium Sulfate 14 meq/ Multivitamins 10 ml/Chromium/

Copper/Manganese/ Seleni/Zn 1 ml/ Insulin Human Regular 20 unit/ Total 

Parenteral Nutrition/Amino Acids/Dextrose/ Fat Emulsion Intravenous 1,920 ml @  

80 mls/hr TPN  CONT IV  Last administered on 6/18/20at 22:26;  Start 6/18/20 at 

22:00;  Stop 6/19/20 at 21:59;  Status DC


Ciprofloxacin/ Dextrose 200 ml @  200 mls/hr Q12HR IV  Last administered on 6/25 /20at 08:27;  Start 6/18/20 at 21:00;  Stop 6/25/20 at 08:56;  Status DC


Albumin Human 250 ml @  62.5 mls/hr 1X  ONCE IV  Last administered on 6/19/20at 

11:09;  Start 6/19/20 at 11:00;  Stop 6/19/20 at 14:59;  Status DC


Furosemide (Lasix) 20 mg 1X  ONCE IVP  Last administered on 6/19/20at 14:52;  

Start 6/19/20 at 10:45;  Stop 6/19/20 at 10:49;  Status DC


Potassium Acetate 60 meq/Magnesium Sulfate 14 meq/ Multivitamins 10 ml/Chromium/

Copper/Manganese/ Seleni/Zn 1 ml/ Insulin Human Regular 15 unit/ Total Par

enteral Nutrition/Amino Acids/Dextrose/ Fat Emulsion Intravenous 1,920 ml @  80 

mls/hr TPN  CONT IV  Last administered on 6/19/20at 22:08;  Start 6/19/20 at 

22:00;  Stop 6/20/20 at 21:59;  Status DC


Potassium Acetate 60 meq/Magnesium Sulfate 14 meq/ Multivitamins 10 ml/Chromium/

Copper/Manganese/ Seleni/Zn 1 ml/ Insulin Human Regular 15 unit/ Total 

Parenteral Nutrition/Amino Acids/Dextrose/ Fat Emulsion Intravenous 1,920 ml @  

80 mls/hr TPN  CONT IV  Last administered on 6/20/20at 22:12;  Start 6/20/20 at 

22:00;  Stop 6/21/20 at 21:59;  Status DC


Potassium Acetate 60 meq/Magnesium Sulfate 14 meq/ Multivitamins 10 ml/Chromium/

Copper/Manganese/ Seleni/Zn 1 ml/ Insulin Human Regular 15 unit/ Total 

Parenteral Nutrition/Amino Acids/Dextrose/ Fat Emulsion Intravenous 1,920 ml @  

80 mls/hr TPN  CONT IV  Last administered on 6/21/20at 22:22;  Start 6/21/20 at 

22:00;  Stop 6/22/20 at 21:59;  Status DC


Furosemide (Lasix) 20 mg 1X  ONCE IVP  Last administered on 6/22/20at 11:07;  

Start 6/22/20 at 10:30;  Stop 6/22/20 at 10:34;  Status DC


Potassium Acetate 60 meq/Magnesium Sulfate 14 meq/ Multivitamins 10 ml/Chromium/

Copper/Manganese/ Seleni/Zn 1 ml/ Insulin Human Regular 15 unit/ Sodium Chloride

20 meq/Total Parenteral Nutrition/Amino Acids/Dextrose/ Fat Emulsion Intravenous

1,920 ml @  80 mls/hr TPN  CONT IV  Last administered on 6/22/20at 21:54;  Start

6/22/20 at 22:00;  Stop 6/23/20 at 21:59;  Status DC


Potassium Acetate 30 meq/Magnesium Sulfate 14 meq/ Multivitamins 10 ml/Chromium/

Copper/Manganese/ Seleni/Zn 1 ml/ Insulin Human Regular 15 unit/ Sodium Chloride

20 meq/Potassium Chloride 30 meq/ Total Parenteral Nutrition/Amino 

Acids/Dextrose/ Fat Emulsion Intravenous 1,920 ml @  80 mls/hr TPN  CONT IV  

Last administered on 6/23/20at 21:46;  Start 6/23/20 at 22:00;  Stop 6/24/20 at 

21:59;  Status DC


Sodium Chloride 80 meq/Potassium Chloride 30 meq/ Potassium Acetate 30 

meq/Magnesium Sulfate 14 meq/ Multivitamins 10 ml/Chromium/ Copper/Manganese/ 

Seleni/Zn 1 ml/ Insulin Human Regular 15 unit/ Total Parenteral Nutrition/Amino 

Acids/Dextrose/ Fat Emulsion Intravenous 1,920 ml @  80 mls/hr TPN  CONT IV  

Last administered on 6/24/20at 22:33;  Start 6/24/20 at 22:00;  Stop 6/25/20 at 

21:59;  Status DC


Furosemide (Lasix) 40 mg 1X  ONCE IVP  Last administered on 6/24/20at 16:27;  

Start 6/24/20 at 15:30;  Stop 6/24/20 at 15:33;  Status DC


Albumin Human 250 ml @  62.5 mls/hr 1X  ONCE IV  Last administered on 6/24/20at 

16:27;  Start 6/24/20 at 15:30;  Stop 6/24/20 at 19:29;  Status DC


Sodium Chloride 80 meq/Potassium Chloride 30 meq/ Potassium Acetate 30 

meq/Magnesium Sulfate 14 meq/ Multivitamins 10 ml/Chromium/ Copper/Manganese/ 

Seleni/Zn 1 ml/ Insulin Human Regular 15 unit/ Total Parenteral Nutrition/Amino 

Acids/Dextrose/ Fat Emulsion Intravenous 1,920 ml @  80 mls/hr TPN  CONT IV  

Last administered on 6/25/20at 22:25;  Start 6/25/20 at 22:00;  Stop 6/26/20 at 

21:59;  Status DC


Sodium Chloride 80 meq/Potassium Chloride 30 meq/ Potassium Acetate 30 

meq/Magnesium Sulfate 14 meq/ Multivitamins 10 ml/Chromium/ Copper/Manganese/ 

Seleni/Zn 1 ml/ Insulin Human Regular 15 unit/ Total Parenteral Nutrition/Amino 

Acids/Dextrose/ Fat Emulsion Intravenous 1,920 ml @  80 mls/hr TPN  CONT IV  

Last administered on 6/26/20at 21:32;  Start 6/26/20 at 22:00;  Stop 6/27/20 at 

21:59;  Status DC


Sodium Chloride 80 meq/Potassium Chloride 30 meq/ Potassium Acetate 30 

meq/Magnesium Sulfate 14 meq/ Multivitamins 10 ml/Chromium/ Copper/Manganese/ 

Seleni/Zn 1 ml/ Insulin Human Regular 15 unit/ Total Parenteral Nutrition/Amino 

Acids/Dextrose/ Fat Emulsion Intravenous 1,920 ml @  80 mls/hr TPN  CONT IV  

Last administered on 6/27/20at 21:53;  Start 6/27/20 at 22:00;  Stop 6/28/20 at 

21:59;  Status DC


Acetylcysteine (Mucomyst 20% Resp Treatment) 600 mg RTBID NEB  Last administered

on 7/17/20at 07:25;  Start 6/27/20 at 12:00


Sodium Chloride 80 meq/Potassium Chloride 30 meq/ Potassium Acetate 30 

meq/Magnesium Sulfate 14 meq/ Multivitamins 10 ml/Chromium/ Copper/Manganese/ 

Seleni/Zn 1 ml/ Insulin Human Regular 15 unit/ Total Parenteral Nutrition/Amino 

Acids/Dextrose/ Fat Emulsion Intravenous 1,920 ml @  80 mls/hr TPN  CONT IV  

Last administered on 6/28/20at 22:06;  Start 6/28/20 at 22:00;  Stop 6/29/20 at 

21:59;  Status DC


Meropenem 500 mg/ Sodium Chloride 50 ml @  100 mls/hr Q6HRS IV  Last 

administered on 7/17/20at 05:37;  Start 6/28/20 at 18:00


Daptomycin 500 mg/ Sodium Chloride 50 ml @  100 mls/hr Q24H IV  Last 

administered on 7/6/20at 21:47;  Start 6/28/20 at 19:00;  Stop 7/7/20 at 08:13; 

Status DC


Sodium Chloride 80 meq/Potassium Chloride 30 meq/ Potassium Acetate 30 

meq/Magnesium Sulfate 14 meq/ Multivitamins 10 ml/Chromium/ Copper/Manganese/ 

Seleni/Zn 1 ml/ Insulin Human Regular 15 unit/ Total Parenteral Nutrition/Amino 

Acids/Dextrose/ Fat Emulsion Intravenous 1,920 ml @  80 mls/hr TPN  CONT IV  

Last administered on 6/29/20at 22:09;  Start 6/29/20 at 22:00;  Stop 6/30/20 at 

21:59;  Status DC


Heparin Sodium (Porcine) 1000 unit/Sodium Chloride 1,001 ml @  1,001 mls/hr 1X  

ONCE IRR ;  Start 6/30/20 at 06:00;  Stop 6/30/20 at 06:59;  Status DC


Propofol (Diprivan) 200 mg STK-MED ONCE IV ;  Start 6/30/20 at 07:44;  Stop 

6/30/20 at 07:44;  Status DC


Lidocaine HCl (Lidocaine Pf 2% Vial) 5 ml STK-MED ONCE .ROUTE ;  Start 6/30/20 

at 07:44;  Stop 6/30/20 at 07:44;  Status DC


Fentanyl Citrate (Fentanyl 2ml Vial) 100 mcg STK-MED ONCE .ROUTE ;  Start 

6/30/20 at 07:44;  Stop 6/30/20 at 07:44;  Status DC


Rocuronium Bromide (Zemuron) 100 mg STK-MED ONCE .ROUTE ;  Start 6/30/20 at 

07:44;  Stop 6/30/20 at 07:44;  Status DC


Micafungin Sodium 100 mg/Dextrose 100 ml @  100 mls/hr Q24H IV  Last 

administered on 7/17/20at 08:22;  Start 6/30/20 at 08:30


Bupivacaine HCl/ Epinephrine Bitart (Sensorcain-Epi 0.5%-1:121492 Mpf) 30 ml 

STK-MED ONCE .ROUTE ;  Start 6/30/20 at 08:34;  Stop 6/30/20 at 08:35;  Status 

DC


Iohexol (Omnipaque 300 Mg/ml) 50 ml STK-MED ONCE .ROUTE  Last administered on 

6/30/20at 13:30;  Start 6/30/20 at 08:35;  Stop 6/30/20 at 08:35;  Status DC


Sodium Chloride 80 meq/Potassium Chloride 30 meq/ Potassium Acetate 30 

meq/Magnesium Sulfate 14 meq/ Multivitamins 10 ml/Chromium/ Copper/Manganese/ 

Seleni/Zn 1 ml/ Insulin Human Regular 15 unit/ Total Parenteral Nutrition/Amino 

Acids/Dextrose/ Fat Emulsion Intravenous 1,920 ml @  80 mls/hr TPN  CONT IV  

Last administered on 7/1/20at 01:22;  Start 6/30/20 at 22:00;  Stop 7/1/20 at 

21:59;  Status DC


Phenylephrine HCl (Ken-Synephrine Inj) 10 mg STK-MED ONCE .ROUTE ;  Start 

6/30/20 at 10:15;  Stop 6/30/20 at 10:15;  Status DC


Desflurane (Suprane) 90 ml STK-MED ONCE IH ;  Start 6/30/20 at 10:18;  Stop 

6/30/20 at 10:19;  Status DC


Albumin Human 500 ml @  As Directed STK-MED ONCE IV ;  Start 6/30/20 at 11:06;  

Stop 6/30/20 at 11:06;  Status DC


Vasopressin (Vasostrict) 20 unit STK-MED ONCE .ROUTE ;  Start 6/30/20 at 12:23; 

Stop 6/30/20 at 12:23;  Status DC


Phenylephrine HCl (Ken-Synephrine Inj) 10 mg STK-MED ONCE .ROUTE ;  Start 

6/30/20 at 13:33;  Stop 6/30/20 at 13:33;  Status DC


Phenylephrine HCl (Ken-Synephrine Inj) 10 mg STK-MED ONCE .ROUTE ;  Start 

6/30/20 at 13:33;  Stop 6/30/20 at 13:33;  Status DC


Ondansetron HCl (Zofran) 4 mg STK-MED ONCE .ROUTE ;  Start 6/30/20 at 13:33;  

Stop 6/30/20 at 13:33;  Status DC


Enoxaparin Sodium (Lovenox 40mg Syringe) 40 mg Q24H SQ  Last administered on 

7/17/20at 08:24;  Start 7/1/20 at 08:00


Sodium Chloride (Normal Saline Flush) 3 ml QSHIFT  PRN IV AFTER MEDS AND BLOOD 

DRAWS;  Start 6/30/20 at 14:45


Naloxone HCl (Narcan) 0.4 mg PRN Q2MIN  PRN IV SEE INSTRUCTIONS;  Start 6/30/20 

at 14:45


Sodium Chloride 1,000 ml @  25 mls/hr Q24H IV  Last administered on 7/16/20at 

14:33;  Start 6/30/20 at 14:33


Morphine Sulfate (Morphine Sulfate) 1 mg PRN Q1HR  PRN IV PAIN;  Start 6/30/20 

at 14:45


Midazolam HCl 100 mg/Sodium Chloride 100 ml @ 1 mls/hr CONT  PRN IV SEE I/O 

RECORD Last administered on 7/3/20at 18:48;  Start 6/30/20 at 14:45


Phenylephrine HCl (PHENYLEPHRINE in 0.9% NACL PF) 1 mg STK-MED ONCE IV ;  Start 

6/30/20 at 14:44;  Stop 6/30/20 at 14:45;  Status DC


Ephedrine Sulfate (ePHEDrine PF IN SALINE SYRINGE) 50 mg STK-MED ONCE IV ;  

Start 6/30/20 at 14:45;  Stop 6/30/20 at 14:45;  Status DC


Vasopressin 20 unit/Dextrose 101 ml @  12 mls/hr CONT  PRN IV SEE I/O RECORD 

Last administered on 7/7/20at 04:17;  Start 6/30/20 at 15:30


Sodium Chloride 1,000 ml @  1,000 mls/hr 1X  ONCE IV  Last administered on 

6/30/20at 15:42;  Start 6/30/20 at 15:45;  Stop 6/30/20 at 16:44;  Status DC


Albumin Human 500 ml @  125 mls/hr 1X  ONCE IV ;  Start 6/30/20 at 16:00;  Stop 

6/30/20 at 19:59;  Status DC


Albumin Human 500 ml @  125 mls/hr PRN Q1HR  PRN IV PER PROTOCOL;  Start 6/30/20

at 15:45


Magnesium Sulfate 50 ml @ 25 mls/hr 1X  ONCE IV  Last administered on 6/30/20at 

17:02;  Start 6/30/20 at 16:30;  Stop 6/30/20 at 18:29;  Status DC


Sodium Bicarbonate (Sodium Bicarb Adult 8.4% Syr) 50 meq STK-MED ONCE .ROUTE ;  

Start 6/30/20 at 16:20;  Stop 6/30/20 at 16:20;  Status DC


Sodium Bicarbonate (Sodium Bicarb Adult 8.4% Syr) 100 meq 1X  ONCE IV  Last 

administered on 6/30/20at 17:07;  Start 6/30/20 at 16:30;  Stop 6/30/20 at 

16:31;  Status DC


Sodium Bicarbonate 150 meq/Dextrose 1,150 ml @  75 mls/hr 1X  ONCE IV  Last 

administered on 6/30/20at 20:02;  Start 6/30/20 at 16:30;  Stop 7/1/20 at 07:49;

 Status DC


Sodium Chloride 80 meq/Potassium Chloride 30 meq/ Potassium Acetate 30 

meq/Magnesium Sulfate 14 meq/ Multivitamins 10 ml/Chromium/ Copper/Manganese/ 

Seleni/Zn 1 ml/ Insulin Human Regular 15 unit/ Total Parenteral Nutrition/Amino 

Acids/Dextrose/ Fat Emulsion Intravenous 1,920 ml @  80 mls/hr TPN  CONT IV  

Last administered on 7/1/20at 23:05;  Start 7/1/20 at 22:00;  Stop 7/2/20 at 

21:59;  Status DC


Sodium Chloride 100 meq/Potassium Chloride 30 meq/ Potassium Acetate 30 

meq/Magnesium Sulfate 12 meq/ Multivitamins 10 ml/Chromium/ Copper/Manganese/ 

Seleni/Zn 1 ml/ Insulin Human Regular 15 unit/ Total Parenteral Nutrition/Amino 

Acids/Dextrose/ Fat Emulsion Intravenous 1,920 ml @  80 mls/hr TPN  CONT IV  L

ast administered on 7/2/20at 21:52;  Start 7/2/20 at 22:00;  Stop 7/3/20 at 

21:59;  Status DC


Sodium Chloride 100 meq/Potassium Chloride 30 meq/ Potassium Acetate 30 

meq/Magnesium Sulfate 12 meq/ Multivitamins 10 ml/Chromium/ Copper/Manganese/ 

Seleni/Zn 1 ml/ Insulin Human Regular 15 unit/ Total Parenteral Nutrition/Amino 

Acids/Dextrose/ Fat Emulsion Intravenous 1,920 ml @  80 mls/hr TPN  CONT IV  

Last administered on 7/3/20at 21:46;  Start 7/3/20 at 22:00;  Stop 7/4/20 at 

21:59;  Status DC


Sodium Chloride 100 meq/Potassium Chloride 30 meq/ Potassium Acetate 30 

meq/Magnesium Sulfate 12 meq/ Multivitamins 10 ml/Chromium/ Copper/Manganese/ 

Seleni/Zn 1 ml/ Insulin Human Regular 15 unit/ Total Parenteral Nutrition/Amino 

Acids/Dextrose/ Fat Emulsion Intravenous 1,800 ml @  75 mls/hr TPN  CONT IV  

Last administered on 7/4/20at 22:04;  Start 7/4/20 at 22:00;  Stop 7/5/20 at 

21:59;  Status DC


Fentanyl Citrate 55 ml @ 0 mls/hr CONT  PRN IV SEE COMMENTS Last administered on

7/6/20at 23:55;  Start 7/4/20 at 13:00;  Stop 7/9/20 at 17:28;  Status DC


Sodium Chloride 100 meq/Potassium Chloride 30 meq/ Potassium Acetate 30 

meq/Magnesium Sulfate 12 meq/ Multivitamins 10 ml/Chromium/ Copper/Manganese/ 

Seleni/Zn 1 ml/ Insulin Human Regular 15 unit/ Total Parenteral Nutrition/Amino 

Acids/Dextrose/ Fat Emulsion Intravenous 1,680 ml @  70 mls/hr TPN  CONT IV  

Last administered on 7/5/20at 21:23;  Start 7/5/20 at 22:00;  Stop 7/6/20 at 

21:59;  Status DC


Sodium Chloride 110 meq/Potassium Chloride 30 meq/ Potassium Acetate 30 

meq/Magnesium Sulfate 15 meq/ Multivitamins 10 ml/Chromium/ Copper/Manganese/ 

Seleni/Zn 1 ml/ Insulin Human Regular 15 unit/ Total Parenteral Nutrition/Amino 

Acids/Dextrose/ Fat Emulsion Intravenous 1,680 ml @  70 mls/hr TPN  CONT IV  

Last administered on 7/6/20at 21:48;  Start 7/6/20 at 22:00;  Stop 7/7/20 at 

21:59;  Status DC


Sodium Chloride 110 meq/Potassium Chloride 30 meq/ Potassium Acetate 30 

meq/Magnesium Sulfate 15 meq/ Multivitamins 10 ml/Chromium/ Copper/Manganese/ 

Seleni/Zn 1 ml/ Insulin Human Regular 15 unit/ Total Parenteral Nutrition/Amino 

Acids/Dextrose/ Fat Emulsion Intravenous 1,680 ml @  70 mls/hr TPN  CONT IV  

Last administered on 7/7/20at 21:33;  Start 7/7/20 at 22:00;  Stop 7/8/20 at 

21:59;  Status DC


Sodium Chloride 110 meq/Potassium Chloride 30 meq/ Potassium Acetate 30 

meq/Magnesium Sulfate 15 meq/ Multivitamins 10 ml/Chromium/ Copper/Manganese/ 

Seleni/Zn 1 ml/ Insulin Human Regular 15 unit/ Total Parenteral Nutrition/Amino 

Acids/Dextrose/ Fat Emulsion Intravenous 1,680 ml @  70 mls/hr TPN  CONT IV  

Last administered on 7/8/20at 21:51;  Start 7/8/20 at 22:00;  Stop 7/9/20 at 

21:59;  Status DC


Sodium Chloride 90 meq/Potassium Chloride 30 meq/ Potassium Acetate 30 

meq/Magnesium Sulfate 15 meq/ Multivitamins 10 ml/Chromium/ Copper/Manganese/ 

Seleni/Zn 1 ml/ Insulin Human Regular 15 unit/ Total Parenteral Nutrition/Amino 

Acids/Dextrose/ Fat Emulsion Intravenous 1,680 ml @  70 mls/hr TPN  CONT IV  

Last administered on 7/9/20at 22:38;  Start 7/9/20 at 22:00;  Stop 7/10/20 at 

21:59;  Status DC


Fentanyl Citrate 30 ml @ 0 mls/hr CONT  PRN IV SEE I/O RECORD;  Start 7/9/20 at 

17:30


Fentanyl (Duragesic 12mcg/ Hr Patch) 1 patch Q3DAYS TD  Last administered on 

7/16/20at 08:27;  Start 7/10/20 at 09:00


Sodium Chloride 90 meq/Potassium Chloride 30 meq/ Potassium Acetate 30 

meq/Magnesium Sulfate 15 meq/ Multivitamins 10 ml/Chromium/ Copper/Manganese/ 

Seleni/Zn 1 ml/ Insulin Human Regular 15 unit/ Total Parenteral Nutrition/Amino 

Acids/Dextrose/ Fat Emulsion Intravenous 1,680 ml @  70 mls/hr TPN  CONT IV  

Last administered on 7/10/20at 21:59;  Start 7/10/20 at 22:00;  Stop 7/11/20 at 

21:59;  Status DC


Sodium Chloride 90 meq/Potassium Chloride 30 meq/ Potassium Acetate 30 

meq/Magnesium Sulfate 15 meq/ Multivitamins 10 ml/Chromium/ Copper/Manganese/ 

Seleni/Zn 1 ml/ Insulin Human Regular 15 unit/ Total Parenteral Nutrition/Amino 

Acids/Dextrose/ Fat Emulsion Intravenous 1,680 ml @  70 mls/hr TPN  CONT IV  

Last administered on 7/11/20at 21:35;  Start 7/11/20 at 22:00;  Stop 7/12/20 at 

21:59;  Status DC


Vancomycin HCl (Vanco Per Pharmacy) 1 each PRN DAILY  PRN MC SEE COMMENTS Last 

administered on 7/14/20at 02:46;  Start 7/12/20 at 09:15;  Stop 7/15/20 at 

07:41;  Status DC


Ciprofloxacin/ Dextrose 200 ml @  200 mls/hr Q12HR IV  Last administered on 

7/17/20at 08:25;  Start 7/12/20 at 10:00


Vancomycin HCl 2 gm/Sodium Chloride 500 ml @  250 mls/hr 1X  ONCE IV  Last 

administered on 7/12/20at 10:34;  Start 7/12/20 at 10:00;  Stop 7/12/20 at 

11:59;  Status DC


Sodium Chloride 90 meq/Potassium Chloride 30 meq/ Potassium Acetate 30 meq/M

agnesium Sulfate 15 meq/ Multivitamins 10 ml/Chromium/ Copper/Manganese/ 

Seleni/Zn 1 ml/ Insulin Human Regular 15 unit/ Total Parenteral Nutrition/Amino 

Acids/Dextrose/ Fat Emulsion Intravenous 1,680 ml @  70 mls/hr TPN  CONT IV  

Last administered on 7/12/20at 22:02;  Start 7/12/20 at 22:00;  Stop 7/13/20 at 

21:59;  Status DC


Diphenhydramine HCl (Benadryl) 25 mg 1X  ONCE IVP  Last administered on 

7/12/20at 14:26;  Start 7/12/20 at 14:30;  Stop 7/12/20 at 14:31;  Status DC


Vancomycin HCl 1.5 gm/Sodium Chloride 500 ml @  250 mls/hr Q8H IV  Last 

administered on 7/13/20at 03:08;  Start 7/12/20 at 18:30;  Stop 7/13/20 at 

12:24;  Status DC


Vancomycin HCl (Vancomycin Trough Level) 1 each 1X  ONCE MC  Last administered 

on 7/13/20at 10:00;  Start 7/13/20 at 10:00;  Stop 7/13/20 at 10:01;  Status DC


Sodium Chloride 90 meq/Potassium Chloride 30 meq/ Potassium Acetate 30 

meq/Magnesium Sulfate 15 meq/ Multivitamins 10 ml/Chromium/ Copper/Manganese/ 

Seleni/Zn 1 ml/ Insulin Human Regular 15 unit/ Total Parenteral Nutrition/Amino 

Acids/Dextrose/ Fat Emulsion Intravenous 1,680 ml @  70 mls/hr TPN  CONT IV  

Last administered on 7/13/20at 22:13;  Start 7/13/20 at 22:00;  Stop 7/14/20 at 

21:59;  Status DC


Vancomycin HCl (Vancomycin Random Level) 1 each 1X  ONCE MC  Last administered 

on 7/14/20at 01:00;  Start 7/14/20 at 01:00;  Stop 7/14/20 at 01:01;  Status DC


Vancomycin HCl 1.5 gm/Sodium Chloride 500 ml @  250 mls/hr Q12H IV  Last 

administered on 7/14/20at 22:07;  Start 7/14/20 at 10:00;  Stop 7/15/20 at 

07:41;  Status DC


Vancomycin HCl (Vancomycin Trough Level) 1 each 1X  ONCE MC ;  Start 7/15/20 at 

09:30;  Stop 7/15/20 at 09:31;  Status Cancel


Sodium Chloride 90 meq/Potassium Chloride 30 meq/ Potassium Acetate 30 

meq/Magnesium Sulfate 15 meq/ Multivitamins 10 ml/Chromium/ Copper/Manganese/ 

Seleni/Zn 1 ml/ Insulin Human Regular 15 unit/ Total Parenteral Nutrition/Amino 

Acids/Dextrose/ Fat Emulsion Intravenous 1,680 ml @  70 mls/hr TPN  CONT IV  

Last administered on 7/14/20at 22:08;  Start 7/14/20 at 22:00;  Stop 7/15/20 at 

21:59;  Status DC


Alteplase, Recombinant (Cathflo For Central Catheter Clearance) 1 mg 1X  ONCE 

INT CAT  Last administered on 7/14/20at 11:49;  Start 7/14/20 at 11:00;  Stop 

7/14/20 at 11:01;  Status DC


Daptomycin 500 mg/ Sodium Chloride 50 ml @  100 mls/hr Q24H IV  Last 

administered on 7/17/20at 08:23;  Start 7/15/20 at 09:00


Sodium Chloride 90 meq/Potassium Chloride 30 meq/ Potassium Acetate 30 

meq/Magnesium Sulfate 15 meq/ Multivitamins 10 ml/Chromium/ Copper/Manganese/ 

Seleni/Zn 1 ml/ Insulin Human Regular 15 unit/ Total Parenteral Nutrition/Amino 

Acids/Dextrose/ Fat Emulsion Intravenous 1,680 ml @  70 mls/hr TPN  CONT IV  

Last administered on 7/15/20at 22:55;  Start 7/15/20 at 22:00;  Stop 7/16/20 at 

21:59;  Status DC


Sodium Chloride 90 meq/Potassium Chloride 30 meq/ Potassium Acetate 30 

meq/Magnesium Sulfate 15 meq/ Multivitamins 10 ml/Chromium/ Copper/Manganese/ 

Seleni/Zn 1 ml/ Insulin Human Regular 15 unit/ Total Parenteral Nutrition/Amino 

Acids/Dextrose/ Fat Emulsion Intravenous 1,680 ml @  70 mls/hr TPN  CONT IV  

Last administered on 7/16/20at 22:06;  Start 7/16/20 at 22:00;  Stop 7/17/20 at 

21:59


Diphenhydramine HCl (Benadryl) 50 mg STK-MED ONCE .ROUTE ;  Start 7/16/20 at 

18:34;  Stop 7/16/20 at 18:35;  Status DC


Diphenhydramine HCl (Benadryl) 25 mg 1X  ONCE IM ;  Start 7/16/20 at 18:45;  

Stop 7/16/20 at 18:46;  Status DC


Diphenhydramine HCl (Benadryl) 25 mg 1X  ONCE IVP  Last administered on 

7/16/20at 18:56;  Start 7/16/20 at 19:00;  Stop 7/16/20 at 19:01;  Status DC


Alprazolam (Xanax) 0.5 mg PRN TID  PRN PO ANXIETY / AGITATION Last administered 

on 7/17/20at 08:24;  Start 7/17/20 at 08:00





Active Scripts


Active


Reported


Bisoprolol Fumarate 5 Mg Tablet 10 Mg PO DAILY


Vitals/I & O





Vital Sign - Last 24 Hours








 7/16/20 7/16/20 7/16/20 7/16/20





 11:00 11:46 12:00 12:00


 


Temp   99.0 





   99.0 


 


Pulse 111  113 


 


Resp 30  30 


 


B/P (MAP) 161/101 (121)  168/96 (120) 


 


Pulse Ox 96 97 100 


 


O2 Delivery Tracheal Collar Tracheal Collar Tracheal Collar Trach Collar


 


O2 Flow Rate  8.0  8.0


 


    





    





 7/16/20 7/16/20 7/16/20 7/16/20





 12:27 13:00 14:00 15:00


 


Pulse  112 114 130


 


Resp  30 30 30


 


B/P (MAP)  158/98 (118) 153/96 (115) 


 


Pulse Ox 100 96 100 100


 


O2 Delivery  Tracheal Collar Tracheal Collar Tracheal Collar


 


O2 Flow Rate 8.0   





 7/16/20 7/16/20 7/16/20 7/16/20





 15:48 16:00 16:00 17:00


 


Temp   98.9 





   98.9 


 


Pulse   116 122


 


Resp   30 30


 


B/P (MAP)   174/100 (124) 164/94 (117)


 


Pulse Ox 93  100 100


 


O2 Delivery Tracheal Collar Trach Collar Tracheal Collar Tracheal Collar


 


O2 Flow Rate 8.0 8.0  


 


    





    





 7/16/20 7/16/20 7/16/20 7/16/20





 18:00 19:00 19:00 19:30


 


Pulse 118 114 112 


 


Resp 30 34 30 


 


B/P (MAP) 151/95 (113) 151/95 (113) 157/97 (117) 


 


Pulse Ox 100 98 100 100


 


O2 Delivery Tracheal Collar Tracheal Collar Tracheal Collar Tracheal Collar


 


O2 Flow Rate    8.0





 7/16/20 7/16/20 7/16/20 7/16/20





 20:00 20:00 21:00 22:00


 


Temp 99.0   





 99.0   


 


Pulse 120  122 121


 


Resp 34  29 25


 


B/P (MAP) 175/108 (130)  152/88 (109) 144/80 (101)


 


Pulse Ox 100  99 95


 


O2 Delivery Tracheal Collar Trach Collar Tracheal Collar Tracheal Collar


 


O2 Flow Rate  8.0  


 


    





    





 7/16/20 7/17/20 7/17/20 7/17/20





 23:00 00:00 00:00 01:00


 


Temp   98.3 





   98.3 


 


Pulse 119  119 118


 


Resp 26  28 25


 


B/P (MAP) 134/80 (98)  137/77 (97) 136/72 (93)


 


Pulse Ox 96  96 99


 


O2 Delivery Tracheal Collar Trach Collar Tracheal Collar Tracheal Collar


 


O2 Flow Rate  8.0  


 


    





    





 7/17/20 7/17/20 7/17/20 7/17/20





 02:00 03:00 04:00 04:00


 


Temp    99.2





    99.2


 


Pulse 120 127  119


 


Resp 26 39  28


 


B/P (MAP) 140/84 (102) 146/91 (109)  137/78 (97)


 


Pulse Ox 95 99  97


 


O2 Delivery Tracheal Collar Tracheal Collar Trach Collar Tracheal Collar


 


O2 Flow Rate   8.0 


 


    





    





 7/17/20 7/17/20 7/17/20 7/17/20





 05:00 06:00 07:00 07:34


 


Pulse 119 114 120 


 


Resp 28 26 30 


 


B/P (MAP) 151/86 (107) 155/81 (105) 151/87 (108) 


 


Pulse Ox 97 100 99 98


 


O2 Delivery Tracheal Collar Tracheal Collar Tracheal Collar Tracheal Collar


 


O2 Flow Rate    8.0





 7/17/20 7/17/20 7/17/20 7/17/20





 08:00 08:00 09:00 10:00


 


Temp 98.5   





 98.5   


 


Pulse 120  120 120


 


Resp 41  30 30


 


B/P (MAP) 161/90 (113)  177/99 (125) 152/97 (115)


 


Pulse Ox 99  99 99


 


O2 Delivery Tracheal Collar Trach Collar Tracheal Collar Tracheal Collar


 


O2 Flow Rate  8.0  














Intake and Output   


 


 7/16/20 7/16/20 7/17/20





 15:00 23:00 07:00


 


Intake Total  200 ml 1110 ml


 


Output Total 405 ml 1565 ml 1560 ml


 


Balance -405 ml -1365 ml -450 ml











Justicifation of Admission Dx:


Justifications for Admission:


Justification of Admission Dx:  Yes











CHEY TURNER MD              Jul 17, 2020 10:42

## 2020-07-17 NOTE — PDOC
Objective:


Vital Signs:





                                   Vital Signs








  Date Time  Temp Pulse Resp B/P (MAP) Pulse Ox O2 Delivery O2 Flow Rate FiO2


 


7/17/20 12:13     98  8.0 


 


7/17/20 12:00      Trach Collar  


 


7/17/20 10:00  120 30 152/97 (115)    


 


7/17/20 08:00 98.5       





 98.5       








Labs:





Laboratory Tests








Test


 7/16/20


17:27 7/17/20


00:12 7/17/20


05:30 7/17/20


05:36


 


Glucose (Fingerstick) 136 mg/dL  119 mg/dL   139 mg/dL 


 


White Blood Count   9.9 x10^3/uL  


 


Red Blood Count   2.53 x10^6/uL  


 


Hemoglobin   7.2 g/dL  


 


Hematocrit   21.8 %  


 


Mean Corpuscular Volume   86 fL  


 


Mean Corpuscular Hemoglobin   29 pg  


 


Mean Corpuscular Hemoglobin


Concent 


 


 33 g/dL 


 





 


Red Cell Distribution Width   15.2 %  


 


Platelet Count   625 x10^3/uL  


 


Neutrophils (%) (Auto)   69 %  


 


Lymphocytes (%) (Auto)   12 %  


 


Monocytes (%) (Auto)   16 %  


 


Eosinophils (%) (Auto)   3 %  


 


Basophils (%) (Auto)   1 %  


 


Neutrophils # (Auto)   6.9 x10^3/uL  


 


Lymphocytes # (Auto)   1.2 x10^3/uL  


 


Monocytes # (Auto)   1.6 x10^3/uL  


 


Eosinophils # (Auto)   0.3 x10^3/uL  


 


Basophils # (Auto)   0.0 x10^3/uL  


 


Sodium Level   135 mmol/L  


 


Potassium Level   4.1 mmol/L  


 


Chloride Level   101 mmol/L  


 


Carbon Dioxide Level   29 mmol/L  


 


Anion Gap   5  


 


Blood Urea Nitrogen   10 mg/dL  


 


Creatinine   0.6 mg/dL  


 


Estimated GFR


(Cockcroft-Gault) 


 


 106.3 


 





 


Glucose Level   137 mg/dL  


 


Calcium Level   9.1 mg/dL  


 


Test


 7/17/20


12:14 


 


 





 


Glucose (Fingerstick) 138 mg/dL    





  BLOOD CULTURE  Preliminary  


        NO GROWTH AFTER 2 DAYS





PE:





GEN: chronically ill, up in chair, RT and nurse present


LUNGS: trach collar - suctioned


HEART: tachycardic


NEURO/PSYCH: awake





A/P:


S/p pancreatic necrosectomy





--


Continue support.





Justicifation of Admission Dx:


Justifications for Admission:


Justification of Admission Dx:  Yes











RAGHAVENDRA MURCIA         Jul 17, 2020 12:44

## 2020-07-17 NOTE — PDOC
PULMONARY PROGRESS NOTES


Subjective


Nothing new today patient with no significant respiratory distress currently on 

trach shield


Vitals





Vital Signs








  Date Time  Temp Pulse Resp B/P (MAP) Pulse Ox O2 Delivery O2 Flow Rate FiO2


 


7/17/20 07:34     98 Tracheal Collar 8.0 


 


7/17/20 06:00  114 26 155/81 (105)    


 


7/17/20 04:00 99.2       





 99.2       








ROS:  No Nausea, No Chest Pain, No Increase Cough


General:  Alert


HEENT:  Other (trach site ok)


Lungs:  Crackles


Cardiovascular:  S1, S2


Abdomen:  Soft, Non-tender, Other (multiple ROBERT drains )


Neuro Exam:  Alert


Extremities:  Other (+1 BLE edema)


Skin:  Warm


Labs





Laboratory Tests








Test


 7/15/20


18:41 7/16/20


00:12 7/16/20


06:15 7/16/20


17:27


 


Glucose (Fingerstick)


 141 mg/dL


(70-99) 125 mg/dL


(70-99) 141 mg/dL


(70-99) 136 mg/dL


(70-99)


 


White Blood Count


 


 


 8.5 x10^3/uL


(4.0-11.0) 





 


Red Blood Count


 


 


 2.61 x10^6/uL


(3.50-5.40) 





 


Hemoglobin


 


 


 7.4 g/dL


(12.0-15.5) 





 


Hematocrit


 


 


 22.6 %


(36.0-47.0) 





 


Mean Corpuscular Volume   87 fL ()  


 


Mean Corpuscular Hemoglobin   29 pg (25-35)  


 


Mean Corpuscular Hemoglobin


Concent 


 


 33 g/dL


(31-37) 





 


Red Cell Distribution Width


 


 


 15.1 %


(11.5-14.5) 





 


Platelet Count


 


 


 580 x10^3/uL


(140-400) 





 


Neutrophils (%) (Auto)   68 % (31-73)  


 


Lymphocytes (%) (Auto)   14 % (24-48)  


 


Monocytes (%) (Auto)   14 % (0-9)  


 


Eosinophils (%) (Auto)   4 % (0-3)  


 


Basophils (%) (Auto)   0 % (0-3)  


 


Neutrophils # (Auto)


 


 


 5.8 x10^3/uL


(1.8-7.7) 





 


Lymphocytes # (Auto)


 


 


 1.2 x10^3/uL


(1.0-4.8) 





 


Monocytes # (Auto)


 


 


 1.2 x10^3/uL


(0.0-1.1) 





 


Eosinophils # (Auto)


 


 


 0.3 x10^3/uL


(0.0-0.7) 





 


Basophils # (Auto)


 


 


 0.0 x10^3/uL


(0.0-0.2) 





 


Sodium Level


 


 


 136 mmol/L


(136-145) 





 


Potassium Level


 


 


 3.9 mmol/L


(3.5-5.1) 





 


Chloride Level


 


 


 103 mmol/L


() 





 


Carbon Dioxide Level


 


 


 29 mmol/L


(21-32) 





 


Anion Gap   4 (6-14)  


 


Blood Urea Nitrogen


 


 


 10 mg/dL


(7-20) 





 


Creatinine


 


 


 0.6 mg/dL


(0.6-1.0) 





 


Estimated GFR


(Cockcroft-Gault) 


 


 106.3 


 





 


Glucose Level


 


 


 153 mg/dL


(70-99) 





 


Calcium Level


 


 


 8.7 mg/dL


(8.5-10.1) 





 


Test


 7/17/20


00:12 7/17/20


05:30 7/17/20


05:36 





 


Glucose (Fingerstick)


 119 mg/dL


(70-99) 


 139 mg/dL


(70-99) 





 


White Blood Count


 


 9.9 x10^3/uL


(4.0-11.0) 


 





 


Red Blood Count


 


 2.53 x10^6/uL


(3.50-5.40) 


 





 


Hemoglobin


 


 7.2 g/dL


(12.0-15.5) 


 





 


Hematocrit


 


 21.8 %


(36.0-47.0) 


 





 


Mean Corpuscular Volume  86 fL ()   


 


Mean Corpuscular Hemoglobin  29 pg (25-35)   


 


Mean Corpuscular Hemoglobin


Concent 


 33 g/dL


(31-37) 


 





 


Red Cell Distribution Width


 


 15.2 %


(11.5-14.5) 


 





 


Platelet Count


 


 625 x10^3/uL


(140-400) 


 





 


Neutrophils (%) (Auto)  69 % (31-73)   


 


Lymphocytes (%) (Auto)  12 % (24-48)   


 


Monocytes (%) (Auto)  16 % (0-9)   


 


Eosinophils (%) (Auto)  3 % (0-3)   


 


Basophils (%) (Auto)  1 % (0-3)   


 


Neutrophils # (Auto)


 


 6.9 x10^3/uL


(1.8-7.7) 


 





 


Lymphocytes # (Auto)


 


 1.2 x10^3/uL


(1.0-4.8) 


 





 


Monocytes # (Auto)


 


 1.6 x10^3/uL


(0.0-1.1) 


 





 


Eosinophils # (Auto)


 


 0.3 x10^3/uL


(0.0-0.7) 


 





 


Basophils # (Auto)


 


 0.0 x10^3/uL


(0.0-0.2) 


 





 


Sodium Level


 


 135 mmol/L


(136-145) 


 





 


Potassium Level


 


 4.1 mmol/L


(3.5-5.1) 


 





 


Chloride Level


 


 101 mmol/L


() 


 





 


Carbon Dioxide Level


 


 29 mmol/L


(21-32) 


 





 


Anion Gap  5 (6-14)   


 


Blood Urea Nitrogen


 


 10 mg/dL


(7-20) 


 





 


Creatinine


 


 0.6 mg/dL


(0.6-1.0) 


 





 


Estimated GFR


(Cockcroft-Gault) 


 106.3 


 


 





 


Glucose Level


 


 137 mg/dL


(70-99) 


 





 


Calcium Level


 


 9.1 mg/dL


(8.5-10.1) 


 











Laboratory Tests








Test


 7/16/20


17:27 7/17/20


00:12 7/17/20


05:30 7/17/20


05:36


 


Glucose (Fingerstick)


 136 mg/dL


(70-99) 119 mg/dL


(70-99) 


 139 mg/dL


(70-99)


 


White Blood Count


 


 


 9.9 x10^3/uL


(4.0-11.0) 





 


Red Blood Count


 


 


 2.53 x10^6/uL


(3.50-5.40) 





 


Hemoglobin


 


 


 7.2 g/dL


(12.0-15.5) 





 


Hematocrit


 


 


 21.8 %


(36.0-47.0) 





 


Mean Corpuscular Volume   86 fL ()  


 


Mean Corpuscular Hemoglobin   29 pg (25-35)  


 


Mean Corpuscular Hemoglobin


Concent 


 


 33 g/dL


(31-37) 





 


Red Cell Distribution Width


 


 


 15.2 %


(11.5-14.5) 





 


Platelet Count


 


 


 625 x10^3/uL


(140-400) 





 


Neutrophils (%) (Auto)   69 % (31-73)  


 


Lymphocytes (%) (Auto)   12 % (24-48)  


 


Monocytes (%) (Auto)   16 % (0-9)  


 


Eosinophils (%) (Auto)   3 % (0-3)  


 


Basophils (%) (Auto)   1 % (0-3)  


 


Neutrophils # (Auto)


 


 


 6.9 x10^3/uL


(1.8-7.7) 





 


Lymphocytes # (Auto)


 


 


 1.2 x10^3/uL


(1.0-4.8) 





 


Monocytes # (Auto)


 


 


 1.6 x10^3/uL


(0.0-1.1) 





 


Eosinophils # (Auto)


 


 


 0.3 x10^3/uL


(0.0-0.7) 





 


Basophils # (Auto)


 


 


 0.0 x10^3/uL


(0.0-0.2) 





 


Sodium Level


 


 


 135 mmol/L


(136-145) 





 


Potassium Level


 


 


 4.1 mmol/L


(3.5-5.1) 





 


Chloride Level


 


 


 101 mmol/L


() 





 


Carbon Dioxide Level


 


 


 29 mmol/L


(21-32) 





 


Anion Gap   5 (6-14)  


 


Blood Urea Nitrogen


 


 


 10 mg/dL


(7-20) 





 


Creatinine


 


 


 0.6 mg/dL


(0.6-1.0) 





 


Estimated GFR


(Cockcroft-Gault) 


 


 106.3 


 





 


Glucose Level


 


 


 137 mg/dL


(70-99) 





 


Calcium Level


 


 


 9.1 mg/dL


(8.5-10.1) 











Medications





Active Scripts








 Medications  Dose


 Route/Sig


 Max Daily Dose Days Date Category


 


 Bisoprolol


 Fumarate 5 Mg


 Tablet  10 Mg


 PO DAILY


   3/16/20 Reported








Comments


ct reviewed 7/12/20, Decreased left-sided effusion after catheter placement. The




right-sided effusion has increased as has atelectasis.


 





 


There has been exchange or placement of multiple drainage 


tubes and a gastrojejunostomy tube. Both collections are smaller. No 


significant new abdominal fluid collection is seen. The jejunal component 


of the gastrojejunostomy tube appears to be looped in the proximal small 


bowel. 











ct abdomen /pelvis 6/6


1. Removal of the percutaneous pigtail drainage catheters since the prior 


exam. Sequela of pancreatitis with extensive pseudocysts again 


demonstrated, the right-sided collections are slightly larger since the 


prior exam, the left-sided collections are stable. See above.


2. Moderate to large left pleural effusion with atelectasis and collapse 


of most of the left lower lobe, stable. Small right pleural effusion is 


stable.


3. Gallstone.





ct chest 6/15 reviewed








 GRAM NEG COCCOBACILLI:MANY


        SQUAMOUS EPI CELL:RARE


        PMN (WBCs):[id number]


        Unless otherwise specified, Testing Performed by:


        [address] [address]


        For Inquires, the Physician may contact the Microbiology


        department at [phone]





  RESPIRATORY CULTURE  Final  


        Final





        MANY GRAM NEGATIVE RODS on 06/15/20 at 1107


        FINAL ID= [PSEUDOMONAS AERUGINOSA]


        MICRO CHARGES


        PSEUDOMONAS AERUGINOSA





  ANTIMICROBIAL SUSCEPTIBILITY  Final  


        Comment





        NEG ANSON 56


        PSEUDOMONAS AERUGINOSA


        ANTIBIOTIC                        RESULT          INTERPRETATION


        AMIKACIN                          <=16                  S


        AZTREONAM                         <=4                   S


        CEFTAZIDIME                       <=1                   S


        CIPROFLOXACIN                     <=0.25                S


        CEFEPIME                          <=2                   S


        CEFTAZIDIME/AVIBACTAM             <=4                   S


        GENTAMICIN                        <=2                   S


        LEVOFLOXACIN                      <=0.5                 S





Impression


.





IMPRESSION:


1.  Acute hypoxemic respiratory failure secondary to ARDS status post trach, 

developed anemia 6/7, blood drainage from RLQ abdomen drain site, and 

surrounding firmness  / developed septic shock 6/7 from abdomen source, required

levo 6/7


s/p 3 new drains 6/7 with brown color drainage,  


2.  Gallstone pancreatitis, now with ongoing bleeding from prior drain. Anemic. 

s/p Tx multiple units over several days


3.  septic shock/sepsis, recurrent 6/7, source abdomen. new fever  ? aspiration 

pneumonitis/pneumonia


4.  Acute kidney injury-, Off HD--renal function decling. suspect JED on CKD due

to hypotension , improved now


5.  Acute gallstone pancreatitis.


6.  Hypoalbuminemia.


7.  Moderate persistent effusions, s/p left thora  5/12, reaccumulation of left 

effusion. O2 requirement not changed. 


8.  Fever- ,hypotension. suspect recurrent sepsis/ likely pancreatic source.  

Per ID, per surgery--


9.  Chronic anemia-- ongoing / s/p PRBC


10. Covid 19 testing negative


11. Moderate to large ascites-S/P paracentisis


12.S/P paracentisis with 4 liters removed on 4/15/20


13. S/P IR drain placement on 5/8/2020, removal, re inserted 6/7


14. Depression/Anxiety 


15.,  Fever, per ID


16.  Status post chest tube placement, not much drainage


6/30


S/P Exploratory laparotomy, lysis of adhesions, subtotal cholecystectomy with 

cholangiogram, gastrojejunostomy tube placement, pancreatic necrosectomy


leukocytosis- improving





Plan


.


Chest tube continues to drain


Continue current support, monitor H&H, afebrile today


Chest tube drained a bit after Cathflo


Trach shield


Up to chair


Follow culture


Follow surgery input


DVT GI prophylaxis


ABX per ID 


f/u BC /resp cultures 


Continue TPN for nutrition support 


DVT/GI PPX


D/W RN and RT,











STEVE MIRANDA MD              Jul 17, 2020 09:04

## 2020-07-18 VITALS — SYSTOLIC BLOOD PRESSURE: 153 MMHG | DIASTOLIC BLOOD PRESSURE: 88 MMHG

## 2020-07-18 VITALS — SYSTOLIC BLOOD PRESSURE: 123 MMHG | DIASTOLIC BLOOD PRESSURE: 72 MMHG

## 2020-07-18 VITALS — SYSTOLIC BLOOD PRESSURE: 157 MMHG | DIASTOLIC BLOOD PRESSURE: 94 MMHG

## 2020-07-18 VITALS — SYSTOLIC BLOOD PRESSURE: 138 MMHG | DIASTOLIC BLOOD PRESSURE: 87 MMHG

## 2020-07-18 VITALS — SYSTOLIC BLOOD PRESSURE: 130 MMHG | DIASTOLIC BLOOD PRESSURE: 55 MMHG

## 2020-07-18 VITALS — DIASTOLIC BLOOD PRESSURE: 92 MMHG | SYSTOLIC BLOOD PRESSURE: 155 MMHG

## 2020-07-18 VITALS — DIASTOLIC BLOOD PRESSURE: 98 MMHG | SYSTOLIC BLOOD PRESSURE: 160 MMHG

## 2020-07-18 VITALS — SYSTOLIC BLOOD PRESSURE: 139 MMHG | DIASTOLIC BLOOD PRESSURE: 80 MMHG

## 2020-07-18 VITALS — DIASTOLIC BLOOD PRESSURE: 104 MMHG | SYSTOLIC BLOOD PRESSURE: 163 MMHG

## 2020-07-18 VITALS — DIASTOLIC BLOOD PRESSURE: 98 MMHG | SYSTOLIC BLOOD PRESSURE: 165 MMHG

## 2020-07-18 VITALS — SYSTOLIC BLOOD PRESSURE: 168 MMHG | DIASTOLIC BLOOD PRESSURE: 93 MMHG

## 2020-07-18 VITALS — DIASTOLIC BLOOD PRESSURE: 99 MMHG | SYSTOLIC BLOOD PRESSURE: 151 MMHG

## 2020-07-18 VITALS — SYSTOLIC BLOOD PRESSURE: 152 MMHG | DIASTOLIC BLOOD PRESSURE: 95 MMHG

## 2020-07-18 VITALS — SYSTOLIC BLOOD PRESSURE: 175 MMHG | DIASTOLIC BLOOD PRESSURE: 101 MMHG

## 2020-07-18 VITALS — DIASTOLIC BLOOD PRESSURE: 82 MMHG | SYSTOLIC BLOOD PRESSURE: 135 MMHG

## 2020-07-18 VITALS — SYSTOLIC BLOOD PRESSURE: 158 MMHG | DIASTOLIC BLOOD PRESSURE: 92 MMHG

## 2020-07-18 VITALS — SYSTOLIC BLOOD PRESSURE: 151 MMHG | DIASTOLIC BLOOD PRESSURE: 100 MMHG

## 2020-07-18 VITALS — DIASTOLIC BLOOD PRESSURE: 93 MMHG | SYSTOLIC BLOOD PRESSURE: 135 MMHG

## 2020-07-18 VITALS — DIASTOLIC BLOOD PRESSURE: 97 MMHG | SYSTOLIC BLOOD PRESSURE: 154 MMHG

## 2020-07-18 VITALS — DIASTOLIC BLOOD PRESSURE: 97 MMHG | SYSTOLIC BLOOD PRESSURE: 151 MMHG

## 2020-07-18 VITALS — DIASTOLIC BLOOD PRESSURE: 79 MMHG | SYSTOLIC BLOOD PRESSURE: 115 MMHG

## 2020-07-18 VITALS — SYSTOLIC BLOOD PRESSURE: 141 MMHG | DIASTOLIC BLOOD PRESSURE: 88 MMHG

## 2020-07-18 VITALS — DIASTOLIC BLOOD PRESSURE: 100 MMHG | SYSTOLIC BLOOD PRESSURE: 159 MMHG

## 2020-07-18 LAB
ANION GAP SERPL CALC-SCNC: 3 MMOL/L (ref 6–14)
BASOPHILS # BLD AUTO: 0.1 X10^3/UL (ref 0–0.2)
BASOPHILS NFR BLD: 1 % (ref 0–3)
BUN SERPL-MCNC: 11 MG/DL (ref 7–20)
CALCIUM SERPL-MCNC: 9.5 MG/DL (ref 8.5–10.1)
CHLORIDE SERPL-SCNC: 101 MMOL/L (ref 98–107)
CO2 SERPL-SCNC: 30 MMOL/L (ref 21–32)
CREAT SERPL-MCNC: 0.6 MG/DL (ref 0.6–1)
EOSINOPHIL NFR BLD: 0.5 X10^3/UL (ref 0–0.7)
EOSINOPHIL NFR BLD: 4 % (ref 0–3)
ERYTHROCYTE [DISTWIDTH] IN BLOOD BY AUTOMATED COUNT: 15.2 % (ref 11.5–14.5)
GFR SERPLBLD BASED ON 1.73 SQ M-ARVRAT: 106.3 ML/MIN
GLUCOSE SERPL-MCNC: 138 MG/DL (ref 70–99)
HCT VFR BLD CALC: 23.9 % (ref 36–47)
HGB BLD-MCNC: 7.8 G/DL (ref 12–15.5)
LYMPHOCYTES # BLD: 1.5 X10^3/UL (ref 1–4.8)
LYMPHOCYTES NFR BLD AUTO: 12 % (ref 24–48)
MCH RBC QN AUTO: 28 PG (ref 25–35)
MCHC RBC AUTO-ENTMCNC: 33 G/DL (ref 31–37)
MCV RBC AUTO: 86 FL (ref 79–100)
MONO #: 1.6 X10^3/UL (ref 0–1.1)
MONOCYTES NFR BLD: 13 % (ref 0–9)
NEUT #: 8.6 X10^3/UL (ref 1.8–7.7)
NEUTROPHILS NFR BLD AUTO: 70 % (ref 31–73)
PLATELET # BLD AUTO: 666 X10^3/UL (ref 140–400)
POTASSIUM SERPL-SCNC: 4.2 MMOL/L (ref 3.5–5.1)
RBC # BLD AUTO: 2.77 X10^6/UL (ref 3.5–5.4)
SODIUM SERPL-SCNC: 134 MMOL/L (ref 136–145)
WBC # BLD AUTO: 12.2 X10^3/UL (ref 4–11)

## 2020-07-18 RX ADMIN — FENTANYL CITRATE PRN MCG: 50 INJECTION INTRAMUSCULAR; INTRAVENOUS at 13:59

## 2020-07-18 RX ADMIN — MEROPENEM SCH MLS/HR: 500 INJECTION, POWDER, FOR SOLUTION INTRAVENOUS at 17:49

## 2020-07-18 RX ADMIN — METOPROLOL TARTRATE PRN MG: 5 INJECTION INTRAVENOUS at 17:50

## 2020-07-18 RX ADMIN — ACETAMINOPHEN PRN MG: 650 SUPPOSITORY RECTAL at 21:21

## 2020-07-18 RX ADMIN — PANTOPRAZOLE SODIUM SCH MG: 40 INJECTION, POWDER, FOR SOLUTION INTRAVENOUS at 09:35

## 2020-07-18 RX ADMIN — BACITRACIN SCH MLS/HR: 5000 INJECTION, POWDER, FOR SOLUTION INTRAMUSCULAR at 14:33

## 2020-07-18 RX ADMIN — CIPROFLOXACIN SCH MLS/HR: 2 INJECTION, SOLUTION INTRAVENOUS at 09:37

## 2020-07-18 RX ADMIN — ENOXAPARIN SODIUM SCH MG: 40 INJECTION SUBCUTANEOUS at 09:35

## 2020-07-18 RX ADMIN — DEXTROSE SCH MLS/HR: 50 INJECTION, SOLUTION INTRAVENOUS at 09:37

## 2020-07-18 RX ADMIN — INSULIN LISPRO SCH UNITS: 100 INJECTION, SOLUTION INTRAVENOUS; SUBCUTANEOUS at 06:00

## 2020-07-18 RX ADMIN — IPRATROPIUM BROMIDE AND ALBUTEROL SULFATE SCH ML: .5; 3 SOLUTION RESPIRATORY (INHALATION) at 04:12

## 2020-07-18 RX ADMIN — MEROPENEM SCH MLS/HR: 500 INJECTION, POWDER, FOR SOLUTION INTRAVENOUS at 09:34

## 2020-07-18 RX ADMIN — MEROPENEM SCH MLS/HR: 500 INJECTION, POWDER, FOR SOLUTION INTRAVENOUS at 12:00

## 2020-07-18 RX ADMIN — IPRATROPIUM BROMIDE AND ALBUTEROL SULFATE SCH ML: .5; 3 SOLUTION RESPIRATORY (INHALATION) at 00:00

## 2020-07-18 RX ADMIN — INSULIN LISPRO SCH UNITS: 100 INJECTION, SOLUTION INTRAVENOUS; SUBCUTANEOUS at 00:00

## 2020-07-18 RX ADMIN — IPRATROPIUM BROMIDE AND ALBUTEROL SULFATE SCH ML: .5; 3 SOLUTION RESPIRATORY (INHALATION) at 16:02

## 2020-07-18 RX ADMIN — MEROPENEM SCH MLS/HR: 500 INJECTION, POWDER, FOR SOLUTION INTRAVENOUS at 23:52

## 2020-07-18 RX ADMIN — FENTANYL CITRATE PRN MCG: 50 INJECTION INTRAMUSCULAR; INTRAVENOUS at 21:08

## 2020-07-18 RX ADMIN — IPRATROPIUM BROMIDE AND ALBUTEROL SULFATE SCH ML: .5; 3 SOLUTION RESPIRATORY (INHALATION) at 20:25

## 2020-07-18 RX ADMIN — CIPROFLOXACIN SCH MLS/HR: 2 INJECTION, SOLUTION INTRAVENOUS at 21:07

## 2020-07-18 RX ADMIN — IPRATROPIUM BROMIDE AND ALBUTEROL SULFATE SCH ML: .5; 3 SOLUTION RESPIRATORY (INHALATION) at 08:53

## 2020-07-18 RX ADMIN — MEROPENEM SCH MLS/HR: 500 INJECTION, POWDER, FOR SOLUTION INTRAVENOUS at 00:07

## 2020-07-18 RX ADMIN — Medication PRN EACH: at 10:45

## 2020-07-18 RX ADMIN — MEROPENEM SCH MLS/HR: 500 INJECTION, POWDER, FOR SOLUTION INTRAVENOUS at 05:26

## 2020-07-18 RX ADMIN — DAPTOMYCIN SCH MLS/HR: 500 INJECTION, POWDER, LYOPHILIZED, FOR SOLUTION INTRAVENOUS at 09:33

## 2020-07-18 RX ADMIN — IPRATROPIUM BROMIDE AND ALBUTEROL SULFATE SCH ML: .5; 3 SOLUTION RESPIRATORY (INHALATION) at 12:13

## 2020-07-18 RX ADMIN — INSULIN LISPRO SCH UNITS: 100 INJECTION, SOLUTION INTRAVENOUS; SUBCUTANEOUS at 17:49

## 2020-07-18 RX ADMIN — ACETYLCYSTEINE SCH MG: 200 INHALANT RESPIRATORY (INHALATION) at 20:25

## 2020-07-18 RX ADMIN — INSULIN LISPRO SCH UNITS: 100 INJECTION, SOLUTION INTRAVENOUS; SUBCUTANEOUS at 13:13

## 2020-07-18 RX ADMIN — ACETYLCYSTEINE SCH MG: 200 INHALANT RESPIRATORY (INHALATION) at 08:00

## 2020-07-18 NOTE — PDOC
PROGRESS NOTES


Chief Complaint


Chief Complaint


A/P


Acute hypoxic Respiratory failure required  mechanical ventilation


Tracheostomy


bilateral pleural effusions/pulm edema s/p Throacentesis on 6/15/2020


Severe Acute gallstone pancreatitis (not a surgical candidate at this time) with

necrosis


Acute kidney failure now requiring dialysis


Gallstones (Calculus of gallbladder with acute cholecystitis without 

obstruction)


HTN 


Intractable pain


Intractable nausea


Covid 19 negative. 


Acute on chronic anemia 


EEG: No seizure activity


Fever  - intermittent 


? Ileus with vomiting


Abd distention - U/S and CT reviewed s/p 0.4 L of opaque, debris-containing 

ascites was removed 5/6


Acute pancreatitis with persistent necrosis


Gallstone pancreatitis with necrosis. 


   -CT A/P 6/6 showed multiple pseudocysts, slight larger on the right. s/p 

drains x 3, 6/7.  + PSAE (MDRO-R Cefepime, Zosyn ANSON < 64) and yeast, 


   -s/p drain 4/27. C. parapsilosis. s/p drain 5/6 + yeast & high amylase; s/p 

additional drain on 5/8. Drains removed. 


Ascites s/p paracentesis 4/15 & 5/6. C. parapsilosis 


JED. off HD. 


A large fluid collection in the pancreatic bed has slightly decreased in size, 

described below, the pancreas itself is difficult to  visualize, which could be 

due to necrosis or obscuration of pancreatic  parenchyma from the surrounding 

fluid collection.6/15 


- 4/27 status post ROBERT drain placement + C paropsilosis. s/p additional drains 

5/8


Anemia - S/p PRBCs. 


Cholelithiasis with thickening of the gallbladder wall.


Leucocytosis improving


JDE, hyperkalemia, Metabolic acidosis off dialysis


hypocalcemia 


Prediabetes


HTN


s/p trach


Hyperglycemia


severe protein-caloric malnutrition


Moderate to large left pleural effusion with atelectasis and collapse  of most 

of the left lower lobe, stable


 


Dispo - ICU, critically ill


Chest tube draining


TPN per nutrition 


follow cultures. 


meropenam, cipro, micafungin per ID 


Continue trach shield


ID, gen sx, GI, pulm following 


DVT GI prophylaxis


Continue TPN for nutrition support 


poor prognosis


follow labs


xanax for anxiety prn





History of Present Illness


History of Present Illness


[id number]% via trach shield. no fevers.





Vitals


Vitals





Vital Signs








  Date Time  Temp Pulse Resp B/P (MAP) Pulse Ox O2 Delivery O2 Flow Rate FiO2


 


7/18/20 08:53     98 Tracheal Collar 8.0 


 


7/18/20 06:00  113 24 135/93 (107)    


 


7/18/20 05:00 98.5       





 98.5       











Physical Exam


Physical Exam


GENERAL: Propped up in bed, awake, weak appearing 


HEENT: Pupils equal, oral cavity dry. NGT out, 


NECK:  Tracheostomy 


LUNGS: Diminished aeration bases,  CT on left 


HEART:  S1, S2, regular 


ABDOMEN: Sightly less distended, bowel sounds hypoactive, soft, richardson x 2, 3 ROBERT

drains, G-J tube and + wound vac 


: [place] in place 


EXTREMITIES: Less generalized edema, no cyanosis. Podus boots bilaterally, 


SKIN: warm touch. No signs of rash.  


NEURO: Awake, not responding to questons 


LUE-PICC without signs of complications


General:  Alert, Cooperative (place)


Heart:  Regular rate (SR/ST), Other (distant heart sounds)


Lungs:  Crackles


Abdomen:  Soft, Other (mulitple drains, lower sump drain migrated out)


Extremities:  Other (Diffuse edema)


Skin:  No rashes, No significant lesion





Labs


LABS





Laboratory Tests








Test


 7/17/20


12:14 7/17/20


17:57 7/18/20


05:45


 


Glucose (Fingerstick)


 138 mg/dL


(70-99) 130 mg/dL


(70-99) 





 


White Blood Count


 


 


 12.2 x10^3/uL


(4.0-11.0)


 


Red Blood Count


 


 


 2.77 x10^6/uL


(3.50-5.40)


 


Hemoglobin


 


 


 7.8 g/dL


(12.0-15.5)


 


Hematocrit


 


 


 23.9 %


(36.0-47.0)


 


Mean Corpuscular Volume   86 fL () 


 


Mean Corpuscular Hemoglobin   28 pg (25-35) 


 


Mean Corpuscular Hemoglobin


Concent 


 


 33 g/dL


(31-37)


 


Red Cell Distribution Width


 


 


 15.2 %


(11.5-14.5)


 


Platelet Count


 


 


 666 x10^3/uL


(140-400)


 


Neutrophils (%) (Auto)   70 % (31-73) 


 


Lymphocytes (%) (Auto)   12 % (24-48) 


 


Monocytes (%) (Auto)   13 % (0-9) 


 


Eosinophils (%) (Auto)   4 % (0-3) 


 


Basophils (%) (Auto)   1 % (0-3) 


 


Neutrophils # (Auto)


 


 


 8.6 x10^3/uL


(1.8-7.7)


 


Lymphocytes # (Auto)


 


 


 1.5 x10^3/uL


(1.0-4.8)


 


Monocytes # (Auto)


 


 


 1.6 x10^3/uL


(0.0-1.1)


 


Eosinophils # (Auto)


 


 


 0.5 x10^3/uL


(0.0-0.7)


 


Basophils # (Auto)


 


 


 0.1 x10^3/uL


(0.0-0.2)


 


Sodium Level


 


 


 134 mmol/L


(136-145)


 


Potassium Level


 


 


 4.2 mmol/L


(3.5-5.1)


 


Chloride Level


 


 


 101 mmol/L


()


 


Carbon Dioxide Level


 


 


 30 mmol/L


(21-32)


 


Anion Gap   3 (6-14) 


 


Blood Urea Nitrogen


 


 


 11 mg/dL


(7-20)


 


Creatinine


 


 


 0.6 mg/dL


(0.6-1.0)


 


Estimated GFR


(Cockcroft-Gault) 


 


 106.3 





 


Glucose Level


 


 


 138 mg/dL


(70-99)


 


Calcium Level


 


 


 9.5 mg/dL


(8.5-10.1)











Assessment and Plan


Assessmemt and Plan


Problems


Medical Problems:


(1) Acute pancreatitis


Status: Acute  





(2) Cholelithiasis


Status: Acute  











Comment


Review of Relevant


I have reviewed the following items josy (where applicable) has been applied.


Labs





Laboratory Tests








Test


 7/16/20


17:27 7/17/20


00:12 7/17/20


05:30 7/17/20


05:36


 


Glucose (Fingerstick)


 136 mg/dL


(70-99) 119 mg/dL


(70-99) 


 139 mg/dL


(70-99)


 


White Blood Count


 


 


 9.9 x10^3/uL


(4.0-11.0) 





 


Red Blood Count


 


 


 2.53 x10^6/uL


(3.50-5.40) 





 


Hemoglobin


 


 


 7.2 g/dL


(12.0-15.5) 





 


Hematocrit


 


 


 21.8 %


(36.0-47.0) 





 


Mean Corpuscular Volume   86 fL ()  


 


Mean Corpuscular Hemoglobin   29 pg (25-35)  


 


Mean Corpuscular Hemoglobin


Concent 


 


 33 g/dL


(31-37) 





 


Red Cell Distribution Width


 


 


 15.2 %


(11.5-14.5) 





 


Platelet Count


 


 


 625 x10^3/uL


(140-400) 





 


Neutrophils (%) (Auto)   69 % (31-73)  


 


Lymphocytes (%) (Auto)   12 % (24-48)  


 


Monocytes (%) (Auto)   16 % (0-9)  


 


Eosinophils (%) (Auto)   3 % (0-3)  


 


Basophils (%) (Auto)   1 % (0-3)  


 


Neutrophils # (Auto)


 


 


 6.9 x10^3/uL


(1.8-7.7) 





 


Lymphocytes # (Auto)


 


 


 1.2 x10^3/uL


(1.0-4.8) 





 


Monocytes # (Auto)


 


 


 1.6 x10^3/uL


(0.0-1.1) 





 


Eosinophils # (Auto)


 


 


 0.3 x10^3/uL


(0.0-0.7) 





 


Basophils # (Auto)


 


 


 0.0 x10^3/uL


(0.0-0.2) 





 


Sodium Level


 


 


 135 mmol/L


(136-145) 





 


Potassium Level


 


 


 4.1 mmol/L


(3.5-5.1) 





 


Chloride Level


 


 


 101 mmol/L


() 





 


Carbon Dioxide Level


 


 


 29 mmol/L


(21-32) 





 


Anion Gap   5 (6-14)  


 


Blood Urea Nitrogen


 


 


 10 mg/dL


(7-20) 





 


Creatinine


 


 


 0.6 mg/dL


(0.6-1.0) 





 


Estimated GFR


(Cockcroft-Gault) 


 


 106.3 


 





 


Glucose Level


 


 


 137 mg/dL


(70-99) 





 


Calcium Level


 


 


 9.1 mg/dL


(8.5-10.1) 





 


Test


 7/17/20


12:14 7/17/20


17:57 7/18/20


05:45 





 


Glucose (Fingerstick)


 138 mg/dL


(70-99) 130 mg/dL


(70-99) 


 





 


White Blood Count


 


 


 12.2 x10^3/uL


(4.0-11.0) 





 


Red Blood Count


 


 


 2.77 x10^6/uL


(3.50-5.40) 





 


Hemoglobin


 


 


 7.8 g/dL


(12.0-15.5) 





 


Hematocrit


 


 


 23.9 %


(36.0-47.0) 





 


Mean Corpuscular Volume   86 fL ()  


 


Mean Corpuscular Hemoglobin   28 pg (25-35)  


 


Mean Corpuscular Hemoglobin


Concent 


 


 33 g/dL


(31-37) 





 


Red Cell Distribution Width


 


 


 15.2 %


(11.5-14.5) 





 


Platelet Count


 


 


 666 x10^3/uL


(140-400) 





 


Neutrophils (%) (Auto)   70 % (31-73)  


 


Lymphocytes (%) (Auto)   12 % (24-48)  


 


Monocytes (%) (Auto)   13 % (0-9)  


 


Eosinophils (%) (Auto)   4 % (0-3)  


 


Basophils (%) (Auto)   1 % (0-3)  


 


Neutrophils # (Auto)


 


 


 8.6 x10^3/uL


(1.8-7.7) 





 


Lymphocytes # (Auto)


 


 


 1.5 x10^3/uL


(1.0-4.8) 





 


Monocytes # (Auto)


 


 


 1.6 x10^3/uL


(0.0-1.1) 





 


Eosinophils # (Auto)


 


 


 0.5 x10^3/uL


(0.0-0.7) 





 


Basophils # (Auto)


 


 


 0.1 x10^3/uL


(0.0-0.2) 





 


Sodium Level


 


 


 134 mmol/L


(136-145) 





 


Potassium Level


 


 


 4.2 mmol/L


(3.5-5.1) 





 


Chloride Level


 


 


 101 mmol/L


() 





 


Carbon Dioxide Level


 


 


 30 mmol/L


(21-32) 





 


Anion Gap   3 (6-14)  


 


Blood Urea Nitrogen


 


 


 11 mg/dL


(7-20) 





 


Creatinine


 


 


 0.6 mg/dL


(0.6-1.0) 





 


Estimated GFR


(Cockcroft-Gault) 


 


 106.3 


 





 


Glucose Level


 


 


 138 mg/dL


(70-99) 





 


Calcium Level


 


 


 9.5 mg/dL


(8.5-10.1) 











Laboratory Tests








Test


 7/17/20


12:14 7/17/20


17:57 7/18/20


05:45


 


Glucose (Fingerstick)


 138 mg/dL


(70-99) 130 mg/dL


(70-99) 





 


White Blood Count


 


 


 12.2 x10^3/uL


(4.0-11.0)


 


Red Blood Count


 


 


 2.77 x10^6/uL


(3.50-5.40)


 


Hemoglobin


 


 


 7.8 g/dL


(12.0-15.5)


 


Hematocrit


 


 


 23.9 %


(36.0-47.0)


 


Mean Corpuscular Volume   86 fL () 


 


Mean Corpuscular Hemoglobin   28 pg (25-35) 


 


Mean Corpuscular Hemoglobin


Concent 


 


 33 g/dL


(31-37)


 


Red Cell Distribution Width


 


 


 15.2 %


(11.5-14.5)


 


Platelet Count


 


 


 666 x10^3/uL


(140-400)


 


Neutrophils (%) (Auto)   70 % (31-73) 


 


Lymphocytes (%) (Auto)   12 % (24-48) 


 


Monocytes (%) (Auto)   13 % (0-9) 


 


Eosinophils (%) (Auto)   4 % (0-3) 


 


Basophils (%) (Auto)   1 % (0-3) 


 


Neutrophils # (Auto)


 


 


 8.6 x10^3/uL


(1.8-7.7)


 


Lymphocytes # (Auto)


 


 


 1.5 x10^3/uL


(1.0-4.8)


 


Monocytes # (Auto)


 


 


 1.6 x10^3/uL


(0.0-1.1)


 


Eosinophils # (Auto)


 


 


 0.5 x10^3/uL


(0.0-0.7)


 


Basophils # (Auto)


 


 


 0.1 x10^3/uL


(0.0-0.2)


 


Sodium Level


 


 


 134 mmol/L


(136-145)


 


Potassium Level


 


 


 4.2 mmol/L


(3.5-5.1)


 


Chloride Level


 


 


 101 mmol/L


()


 


Carbon Dioxide Level


 


 


 30 mmol/L


(21-32)


 


Anion Gap   3 (6-14) 


 


Blood Urea Nitrogen


 


 


 11 mg/dL


(7-20)


 


Creatinine


 


 


 0.6 mg/dL


(0.6-1.0)


 


Estimated GFR


(Cockcroft-Gault) 


 


 106.3 





 


Glucose Level


 


 


 138 mg/dL


(70-99)


 


Calcium Level


 


 


 9.5 mg/dL


(8.5-10.1)








Microbiology


7/15/20 Blood Culture - Preliminary, Resulted


          NO GROWTH AFTER 2 DAYS


7/12/20 Gram Stain Evaluation - Final, Complete


          


7/12/20 Respiratory Culture - Final, Complete


          


7/12/20 Antimicrobic Susceptibility - Final, Complete


          


6/30/20 Gram Stain - Final, Complete


          


6/30/20 Aerobic and Anaerobic Culture - Final, Complete


          


6/30/20 Antimicrobic Susceptibility - Final, Complete


          


6/15/20 Gram Stain - Final, Complete


          


6/15/20 Aerobic and Anaerobic Culture - Final, Complete


          


6/7/20 Urine Culture - Final, Complete


         


5/30/20 Gram Stain - Final, Complete


          


5/30/20 Aerobic Culture - Final, Complete


Medications





Current Medications


Sodium Chloride 1,000 ml @  1,000 mls/hr Q1H IV  Last administered on 3/16/20at 

03:00;  Start 3/16/20 at 03:00;  Stop 3/16/20 at 03:59;  Status DC


Ondansetron HCl (Zofran) 4 mg 1X  ONCE IVP  Last administered on 3/16/20at 

03:27;  Start 3/16/20 at 03:00;  Stop 3/16/20 at 03:01;  Status DC


Morphine Sulfate (Morphine Sulfate) 4 mg 1X  ONCE IV ;  Start 3/16/20 at 03:00; 

Stop 3/16/20 at 03:01;  Status Cancel


Ketorolac Tromethamine (Toradol 30mg Vial) 30 mg 1X  ONCE IV  Last administered 

on 3/16/20at 02:54;  Start 3/16/20 at 03:00;  Stop 3/16/20 at 03:01;  Status DC


Fentanyl Citrate (Fentanyl 2ml Vial) 25 mcg 1X  ONCE IVP  Last administered on 

3/16/20at 03:23;  Start 3/16/20 at 03:30;  Stop 3/16/20 at 03:31;  Status DC


Fentanyl Citrate (Fentanyl 2ml Vial) 100 mcg STK-MED ONCE .ROUTE ;  Start 

3/16/20 at 03:18;  Stop 3/16/20 at 03:18;  Status DC


Iohexol (Omnipaque 350 Mg/ml) 90 ml 1X  ONCE IV  Last administered on 3/16/20at 

03:25;  Start 3/16/20 at 03:30;  Stop 3/16/20 at 03:31;  Status DC


Info (CONTRAST GIVEN -- Rx MONITORING) 1 each PRN DAILY  PRN MC SEE COMMENTS;  

Start 3/16/20 at 03:30;  Stop 3/18/20 at 03:29;  Status DC


Hydromorphone HCl (Dilaudid) 0.5 mg 1X  ONCE IV  Last administered on 3/16/20at 

03:55;  Start 3/16/20 at 04:30;  Stop 3/16/20 at 04:32;  Status DC


Ondansetron HCl (Zofran) 4 mg PRN Q8HRS  PRN IV NAUSEA/VOMITING 1ST CHOICE;  

Start 3/16/20 at 05:00;  Stop 3/16/20 at 09:27;  Status DC


Morphine Sulfate (Morphine Sulfate) 2 mg PRN Q2HR  PRN IV SEVERE PAIN 7-10 Last 

administered on 3/17/20at 12:26;  Start 3/16/20 at 05:00;  Stop 3/17/20 at 

14:15;  Status DC


Sodium Chloride 1,000 ml @  125 mls/hr Q8H IV  Last administered on 3/16/20at 

20:56;  Start 3/16/20 at 05:00;  Stop 3/17/20 at 04:59;  Status DC


Hydromorphone HCl (Dilaudid) 0.5 mg PRN Q3HRS  PRN IV SEVERE PAIN 7-10 Last 

administered on 3/17/20at 10:06;  Start 3/16/20 at 05:00;  Stop 3/17/20 at 

12:01;  Status DC


Piperacillin Sod/ Tazobactam Sod 4.5 gm/Sodium Chloride 100 ml @  200 mls/hr 1X 

ONCE IV  Last administered on 3/16/20at 05:44;  Start 3/16/20 at 06:00;  Stop 

3/16/20 at 06:29;  Status DC


Ondansetron HCl (Zofran) 4 mg PRN Q4HRS  PRN IV NAUSEA/VOMITING 1ST CHOICE Last 

administered on 7/16/20at 08:18;  Start 3/16/20 at 09:30


Insulin Human Lispro (HumaLOG) 0-9 UNITS Q6HRS SQ  Last administered on 

7/12/20at 12:43;  Start 3/16/20 at 09:30


Dextrose (Dextrose 50%-Water Syringe) 12.5 gm PRN Q15MIN  PRN IV SEE COMMENTS;  

Start 3/16/20 at 09:30


Pantoprazole Sodium (PROTONIX VIAL for IV PUSH) 40 mg DAILYAC IVP  Last 

administered on 7/18/20at 09:35;  Start 3/16/20 at 11:30


Prochlorperazine Edisylate (Compazine) 10 mg PRN Q6HRS  PRN IV NAUSEA/VOMITING, 

2nd CHOICE Last administered on 7/13/20at 20:17;  Start 3/16/20 at 17:45


Atenolol (Tenormin) 100 mg DAILY PO ;  Start 3/17/20 at 09:00;  Stop 3/16/20 at 

20:08;  Status DC


Metoprolol Tartrate (Lopressor Vial) 2.5 mg Q6HRS IVP  Last administered on 

3/17/20at 05:51;  Start 3/16/20 at 20:15;  Stop 3/17/20 at 10:02;  Status DC


Metoprolol Tartrate (Lopressor Vial) 5 mg Q6HRS IVP  Last administered on 

3/26/20at 00:12;  Start 3/17/20 at 10:15;  Stop 3/28/20 at 08:48;  Status DC


Hydromorphone HCl (Dilaudid) 1 mg PRN Q3HRS  PRN IV SEVERE PAIN 7-10 Last 

administered on 3/23/20at 05:13;  Start 3/17/20 at 12:00;  Stop 3/31/20 at 

00:25;  Status DC


Lidocaine HCl (Buffered Lidocaine 1%) 3 ml STK-MED ONCE .ROUTE ;  Start 3/17/20 

at 12:55;  Stop 3/17/20 at 12:56;  Status DC


Albumin Human 500 ml @  125 mls/hr 1X  ONCE IV  Last administered on 3/17/20at 

14:33;  Start 3/17/20 at 14:30;  Stop 3/17/20 at 18:32;  Status DC


Norepinephrine Bitartrate 8 mg/ Dextrose 258 ml @  17.299 mls/ hr CONT  PRN IV 

PER PROTOCOL Last administered on 4/14/20at 12:48;  Start 3/17/20 at 15:30;  

Stop 4/17/20 at 09:19;  Status DC


Sodium Chloride 1,000 ml @  125 mls/hr Q8H IV  Last administered on 3/17/20at 

21:04;  Start 3/17/20 at 16:00;  Stop 3/18/20 at 02:42;  Status DC


Albumin Human 500 ml @  125 mls/hr PRN BID  PRN IV After every 2L NSS & BP < 

90mm Last administered on 6/30/20at 16:06;  Start 3/17/20 at 16:00;  Stop 7/3/20

at 09:30;  Status DC


Iohexol (Omnipaque 300 Mg/ml) 60 ml 1X  ONCE IV  Last administered on 3/17/20at 

17:20;  Start 3/17/20 at 17:00;  Stop 3/17/20 at 17:01;  Status DC


Info (CONTRAST GIVEN -- Rx MONITORING) 1 each PRN DAILY  PRN MC SEE COMMENTS;  

Start 3/17/20 at 17:00;  Stop 3/19/20 at 16:59;  Status DC


Meropenem 1 gm/ Sodium Chloride 100 ml @  200 mls/hr Q8HRS IV  Last administered

on 3/18/20at 05:45;  Start 3/17/20 at 20:00;  Stop 3/18/20 at 08:48;  Status DC


Furosemide (Lasix) 40 mg 1X  ONCE IVP  Last administered on 3/17/20at 22:12;  

Start 3/17/20 at 22:30;  Stop 3/17/20 at 22:31;  Status DC


Calcium Chloride 1000 mg/Sodium Chloride 110 ml @  220 mls/hr 1X  ONCE IV  Last 

administered on 3/17/20at 22:11;  Start 3/17/20 at 22:30;  Stop 3/17/20 at 

22:59;  Status DC


Albuterol Sulfate (Ventolin Neb Soln) 2.5 mg 1X  ONCE NEB  Last administered on 

3/18/20at 00:56;  Start 3/17/20 at 22:30;  Stop 3/17/20 at 22:31;  Status DC


Insulin Human Regular (HumuLIN R VIAL) 5 unit 1X  ONCE IV  Last administered on 

3/17/20at 22:14;  Start 3/17/20 at 22:30;  Stop 3/17/20 at 22:31;  Status DC


Magnesium Sulfate 50 ml @ 25 mls/hr 1X  ONCE IV  Last administered on 3/18/20at 

02:57;  Start 3/18/20 at 03:00;  Stop 3/18/20 at 04:59;  Status DC


Calcium Gluconate 1000 mg/Sodium Chloride 110 ml @  220 mls/hr 1X  ONCE IV  Last

administered on 3/18/20at 02:46;  Start 3/18/20 at 03:00;  Stop 3/18/20 at 

03:29;  Status DC


Sodium Chloride 1,000 ml @  200 mls/hr Q5H IV  Last administered on 3/18/20at 

02:46;  Start 3/18/20 at 03:00;  Stop 3/18/20 at 10:21;  Status DC


Calcium Gluconate 1000 mg/Sodium Chloride 110 ml @  220 mls/hr 1X  ONCE IV  Last

administered on 3/18/20at 03:21;  Start 3/18/20 at 03:30;  Stop 3/18/20 at 

03:59;  Status DC


Sodium Bicarbonate 50 meq/Sodium Chloride 1,050 ml @  75 mls/hr Q14H IV  Last 

administered on 3/22/20at 21:10;  Start 3/18/20 at 07:30;  Stop 3/23/20 at 

10:28;  Status DC


Calcium Gluconate 2000 mg/Sodium Chloride 120 ml @  220 mls/hr 1X  ONCE IV  Last

administered on 3/18/20at 09:05;  Start 3/18/20 at 07:30;  Stop 3/18/20 at 

08:02;  Status DC


Lidocaine HCl (Xylocaine-Mpf 1% 2ml Vial) 2 ml STK-MED ONCE .ROUTE ;  Start 

3/18/20 at 08:47;  Stop 3/18/20 at 08:47;  Status DC


Meropenem 500 mg/ Sodium Chloride 50 ml @  100 mls/hr Q12HR IV  Last 

administered on 3/23/20at 21:01;  Start 3/18/20 at 18:00;  Stop 3/24/20 at 

07:58;  Status DC


Lidocaine HCl (Buffered Lidocaine 1%) 3 ml STK-MED ONCE .ROUTE ;  Start 3/18/20 

at 09:46;  Stop 3/18/20 at 09:46;  Status DC


Lidocaine HCl (Buffered Lidocaine 1%) 6 ml 1X  ONCE INJ  Last administered on 

3/18/20at 10:26;  Start 3/18/20 at 10:15;  Stop 3/18/20 at 10:16;  Status DC


Info (Tpn Per Pharmacy) 1 each PRN DAILY  PRN MC SEE COMMENTS Last administered 

on 7/17/20at 10:02;  Start 3/18/20 at 12:00


Sodium Chloride 1,000 ml @  1,000 mls/hr Q1H PRN IV hypotension;  Start 3/18/20 

at 12:07;  Stop 3/18/20 at 18:06;  Status DC


Diphenhydramine HCl (Benadryl) 25 mg 1X PRN  PRN IV ITCHING;  Start 3/18/20 at 

12:15;  Stop 3/19/20 at 12:14;  Status DC


Diphenhydramine HCl (Benadryl) 25 mg 1X PRN  PRN IV ITCHING;  Start 3/18/20 at 

12:15;  Stop 3/19/20 at 12:14;  Status DC


Sodium Chloride 1,000 ml @  400 mls/hr Q2H30M PRN IV PATENCY;  Start 3/18/20 at 

12:07;  Stop 3/19/20 at 00:06;  Status DC


Info (PHARMACY MONITORING -- do not chart) 1 each PRN DAILY  PRN MC SEE 

COMMENTS;  Start 3/18/20 at 12:15;  Stop 3/20/20 at 08:13;  Status DC


Sodium Chloride 90 meq/Calcium Gluconate 10 meq/ Multivitamins 10 ml/Chromium/ 

Copper/Manganese/ Seleni/Zn 1 ml/ Total Parenteral Nutrition/Amino 

Acids/Dextrose/ Fat Emulsion Intravenous 55.005 ml  @ 2.292 mls/hr TPN  CONT IV 

;  Start 3/18/20 at 22:00;  Stop 3/18/20 at 12:33;  Status DC


Info (Tpn Per Pharmacy) 1 each PRN DAILY  PRN MC SEE COMMENTS;  Start 3/18/20 at

12:30;  Status UNV


Sodium Chloride 90 meq/Calcium Gluconate 10 meq/ Multivitamins 10 ml/Chromium/ 

Copper/Manganese/ Seleni/Zn 0.5 ml/ Total Parenteral Nutrition/Amino 

Acids/Dextrose/ Fat Emulsion Intravenous 1,512 ml @  63 mls/hr TPN  CONT IV  

Last administered on 3/18/20at 22:06;  Start 3/18/20 at 22:00;  Stop 3/19/20 at 

21:59;  Status DC


Calcium Carbonate/ Glycine (Tums) 500 mg PRN AFTMEALHC  PRN PO INDIGESTION;  

Start 3/18/20 at 17:45;  Stop 5/13/20 at 10:25;  Status DC


Calcium Gluconate (Calcium Gluconate) 2,000 mg 1X  ONCE IVP  Last administered 

on 3/19/20at 02:19;  Start 3/19/20 at 02:15;  Stop 3/19/20 at 02:16;  Status DC


Calcium Chloride 3000 mg/Sodium Chloride 1,030 ml @  50 mls/hr [id number] IV  Last 

administered on 3/21/20at 02:17;  Start 3/19/20 at 08:00;  Stop 3/21/20 at 

15:23;  Status DC


Lorazepam (Ativan Inj) 1 mg PRN Q4HRS  PRN IVP ANXIETY / AGITATION, 2nd choic 

Last administered on 4/17/20at 03:51;  Start 3/19/20 at 09:00;  Stop 4/17/20 at 

09:19;  Status DC


Sodium Chloride 1,000 ml @  1,000 mls/hr Q1H PRN IV hypotension;  Start 3/19/20 

at 08:56;  Stop 3/19/20 at 14:55;  Status DC


Albumin Human 200 ml @  200 mls/hr 1X PRN  PRN IV Hypotension;  Start 3/19/20 at

09:00;  Stop 3/19/20 at 14:59;  Status DC


Diphenhydramine HCl (Benadryl) 25 mg 1X PRN  PRN IV ITCHING;  Start 3/19/20 at 

09:00;  Stop 3/20/20 at 08:59;  Status DC


Diphenhydramine HCl (Benadryl) 25 mg 1X PRN  PRN IV ITCHING;  Start 3/19/20 at 

09:00;  Stop 3/20/20 at 08:59;  Status DC


Sodium Chloride 1,000 ml @  400 mls/hr Q2H30M PRN IV PATENCY;  Start 3/19/20 at 

08:56;  Stop 3/19/20 at 20:55;  Status DC


Info (PHARMACY MONITORING -- do not chart) 1 each PRN DAILY  PRN MC SEE COMMENT

S;  Start 3/19/20 at 09:00;  Status UNV


Info (PHARMACY MONITORING -- do not chart) 1 each PRN DAILY  PRN MC SEE 

COMMENTS;  Start 3/19/20 at 09:00;  Stop 3/20/20 at 08:13;  Status DC


Digoxin (Lanoxin) 500 mcg 1X  ONCE IV  Last administered on 3/19/20at 10:04;  

Start 3/19/20 at 10:00;  Stop 3/19/20 at 10:01;  Status DC


Digoxin (Lanoxin) 125 mcg 1X  ONCE IV  Last administered on 3/19/20at 17:10;  

Start 3/19/20 at 18:00;  Stop 3/19/20 at 18:01;  Status DC


Magnesium Sulfate 100 ml @  25 mls/hr 1X  ONCE IV  Last administered on 

3/19/20at 12:48;  Start 3/19/20 at 13:00;  Stop 3/19/20 at 16:59;  Status DC


Sodium Chloride 90 meq/Magnesium Sulfate 10 meq/ Calcium Gluconate 20 meq/ 

Multivitamins 10 ml/Chromium/ Copper/Manganese/ Seleni/Zn 0.5 ml/ Total 

Parenteral Nutrition/Amino Acids/Dextrose/ Fat Emulsion Intravenous 1,512 ml @  

63 mls/hr TPN  CONT IV  Last administered on 3/19/20at 22:25;  Start 3/19/20 at 

22:00;  Stop 3/20/20 at 21:59;  Status DC


Sodium Chloride 1,000 ml @  1,000 mls/hr Q1H PRN IV hypotension;  Start 3/20/20 

at 08:05;  Stop 3/20/20 at 14:04;  Status DC


Albumin Human 200 ml @  200 mls/hr 1X  ONCE IV  Last administered on 3/20/20at 

08:57;  Start 3/20/20 at 08:15;  Stop 3/20/20 at 09:14;  Status DC


Diphenhydramine HCl (Benadryl) 25 mg 1X PRN  PRN IV ITCHING;  Start 3/20/20 at 

08:15;  Stop 3/21/20 at 08:14;  Status DC


Diphenhydramine HCl (Benadryl) 25 mg 1X PRN  PRN IV ITCHING;  Start 3/20/20 at 

08:15;  Stop 3/21/20 at 08:14;  Status DC


Sodium Chloride 1,000 ml @  400 mls/hr Q2H30M PRN IV PATENCY;  Start 3/20/20 at 

08:05;  Stop 3/20/20 at 20:04;  Status DC


Info (PHARMACY MONITORING -- do not chart) 1 each PRN DAILY  PRN  SEE [facility]

MENTS;  Start 3/20/20 at 08:15;  Stop 3/24/20 at 07:57;  Status DC


Sodium Chloride 90 meq/Potassium Chloride 15 meq/ Potassium Phosphate 10 mmol/ 

Magnesium Sulfate 10 meq/Calcium Gluconate 20 meq/ Multivitamins 10 ml/Chromium/

Copper/Manganese/ Seleni/Zn 0.5 ml/ Total Parenteral Nutrition/Amino 

Acids/Dextrose/ Fat Emulsion Intravenous 1,512 ml @  63 mls/hr TPN  CONT IV  

Last administered on 3/20/20at 21:01;  Start 3/20/20 at 22:00;  Stop 3/21/20 at 

21:59;  Status DC


Potassium Chloride/Water 100 ml @  100 mls/hr 1X  ONCE IV  Last administered on 

3/20/20at 14:09;  Start 3/20/20 at 14:00;  Stop 3/20/20 at 14:59;  Status DC


Benzocaine (Hurricaine One) 1 spray 1X  ONCE MM  Last administered on 3/20/20at 

16:38;  Start 3/20/20 at 14:30;  Stop 3/20/20 at 14:31;  Status DC


Lidocaine HCl (Glydo (Lidocaine) Jelly) 1 thomas 1X  ONCE MM  Last administered on 

3/20/20at 16:38;  Start 3/20/20 at 14:30;  Stop 3/20/20 at 14:31;  Status DC


Linezolid/Dextrose 300 ml @  300 mls/hr Q12HR IV  Last administered on 3/26/20at

21:04;  Start 3/20/20 at 20:00;  Stop 3/27/20 at 07:50;  Status DC


Acetaminophen (Tylenol) 650 mg PRN Q6HRS  PRN PO MILD PAIN / TEMP;  Start 

3/21/20 at 03:30;  Stop 3/21/20 at 03:36;  Status DC


Acetaminophen (Tylenol) 650 mg PRN Q6HRS  PRN PEG MILD PAIN / TEMP Last a

dministered on 4/16/20at 19:56;  Start 3/21/20 at 03:36;  Stop 5/13/20 at 10:25;

 Status DC


Sodium Chloride 1,000 ml @  1,000 mls/hr Q1H PRN IV hypotension;  Start 3/21/20 

at 07:50;  Stop 3/21/20 at 13:49;  Status DC


Albumin Human 200 ml @  200 mls/hr 1X PRN  PRN IV Hypotension;  Start 3/21/20 at

08:00;  Stop 3/21/20 at 13:59;  Status DC


Sodium Chloride (Normal Saline Flush) 10 ml 1X PRN  PRN IV AP catheter pack;  

Start 3/21/20 at 08:00;  Stop 3/22/20 at 07:59;  Status DC


Sodium Chloride (Normal Saline Flush) 10 ml 1X PRN  PRN IV  catheter pack;  

Start 3/21/20 at 08:00;  Stop 3/22/20 at 07:59;  Status DC


Sodium Chloride 1,000 ml @  400 mls/hr Q2H30M PRN IV PATENCY;  Start 3/21/20 at 

07:50;  Stop 3/21/20 at 19:49;  Status DC


Info (PHARMACY MONITORING -- do not chart) 1 each PRN DAILY  PRN MC SEE 

COMMENTS;  Start 3/21/20 at 08:00;  Status UNV


Info (PHARMACY MONITORING -- do not chart) 1 each PRN DAILY  PRN MC SEE 

COMMENTS;  Start 3/21/20 at 08:00;  Stop 3/23/20 at 08:25;  Status DC


Sodium Chloride 90 meq/Potassium Chloride 15 meq/ Potassium Phosphate 10 mmol/ 

Magnesium Sulfate 10 meq/Calcium Gluconate 20 meq/ Multivitamins 10 ml/Chromium/

Copper/Manganese/ Seleni/Zn 0.5 ml/ Total Parenteral Nutrition/Amino 

Acids/Dextrose/ Fat Emulsion Intravenous 1,512 ml @  63 mls/hr TPN  CONT IV  

Last administered on 3/21/20at 20:57;  Start 3/21/20 at 22:00;  Stop 3/22/20 at 

21:59;  Status DC


Sodium Chloride 90 meq/Potassium Chloride 15 meq/ Potassium Phosphate 15 mmol/ 

Magnesium Sulfate 10 meq/Calcium Gluconate 20 meq/ Multivitamins 10 ml/Chromium/

Copper/Manganese/ Seleni/Zn 0.5 ml/ Total Parenteral Nutrition/Amino 

Acids/Dextrose/ Fat Emulsion Intravenous 1,512 ml @  63 mls/hr TPN  CONT IV ;  

Start 3/22/20 at 22:00;  Stop 3/22/20 at 14:16;  Status DC


Sodium Chloride 90 meq/Potassium Chloride 15 meq/ Potassium Phosphate 15 mmol/ 

Magnesium Sulfate 10 meq/Calcium Gluconate 20 meq/ Multivitamins 10 ml/Chromium/

Copper/Manganese/ Seleni/Zn 0.5 ml/ Total Parenteral Nutrition/Amino 

Acids/Dextrose/ Fat Emulsion Intravenous 1,200 ml @  50 mls/hr TPN  CONT IV ;  

Start 3/22/20 at 22:00;  Stop 3/22/20 at 14:17;  Status DC


Sodium Chloride 90 meq/Potassium Chloride 15 meq/ Potassium Phosphate 10 mmol/ 

Magnesium Sulfate 10 meq/Calcium Gluconate 20 meq/ Multivitamins 10 ml/Chromium/

Copper/Manganese/ Seleni/Zn 0.5 ml/ Total Parenteral Nutrition/Amino 

Acids/Dextrose/ Fat Emulsion Intravenous 1,200 ml @  50 mls/hr TPN  CONT IV  

Last administered on 3/22/20at 23:29;  Start 3/22/20 at 22:00;  Stop 3/23/20 at 

21:59;  Status DC


Sodium Chloride 1,000 ml @  1,000 mls/hr Q1H PRN IV hypotension;  Start 3/23/20 

at 07:28;  Stop 3/23/20 at 13:27;  Status DC


Albumin Human 200 ml @  200 mls/hr 1X  ONCE IV  Last administered on 3/23/20at 

08:51;  Start 3/23/20 at 07:30;  Stop 3/23/20 at 08:29;  Status DC


Diphenhydramine HCl (Benadryl) 25 mg 1X PRN  PRN IV ITCHING;  Start 3/23/20 at 

07:30;  Stop 3/24/20 at 07:29;  Status DC


Diphenhydramine HCl (Benadryl) 25 mg 1X PRN  PRN IV ITCHING;  Start 3/23/20 at 

07:30;  Stop 3/24/20 at 07:29;  Status DC


Sodium Chloride 1,000 ml @  400 mls/hr Q2H30M PRN IV PATENCY;  Start 3/23/20 at 

07:28;  Stop 3/23/20 at 19:27;  Status DC


Info (PHARMACY MONITORING -- do not chart) 1 each PRN DAILY  PRN MC SEE 

COMMENTS;  Start 3/23/20 at 07:30;  Stop 4/3/20 at 13:01;  Status DC


Metronidazole 100 ml @  100 mls/hr Q6HRS IV  Last administered on 4/8/20at 

06:26;  Start 3/23/20 at 08:30;  Stop 4/8/20 at 09:58;  Status DC


Micafungin Sodium 100 mg/Dextrose 100 ml @  100 mls/hr Q24H IV  Last 

administered on 4/30/20at 08:18;  Start 3/23/20 at 09:00;  Stop 4/30/20 at 

20:58;  Status DC


Propofol 0 ml @ As Directed STK-MED ONCE IV ;  Start 3/23/20 at 07:53;  Stop 

3/23/20 at 07:53;  Status DC


Etomidate (Amidate) 20 mg STK-MED ONCE IV ;  Start 3/23/20 at 07:53;  Stop 

3/23/20 at 07:54;  Status DC


Midazolam HCl (Versed) 5 mg STK-MED ONCE .ROUTE ;  Start 3/23/20 at 07:57;  Stop

3/23/20 at 07:57;  Status DC


Fentanyl Citrate 30 ml @ 0 mls/hr CONT  PRN IV SEE PROTOCOL Last administered on

4/17/20at 06:12;  Start 3/23/20 at 08:15;  Stop 4/17/20 at 09:19;  Status DC


Artificial Tears (Artificial Tears) 1 drop PRN Q1HR  PRN OU DRY EYE, 1st choice;

 Start 3/23/20 at 08:15;  Stop 4/29/20 at 05:31;  Status DC


Midazolam HCl 50 mg/Sodium Chloride 50 ml @ 0 mls/hr CONT  PRN IV SEE PROTOCOL 

Last administered on 3/26/20at 22:39;  Start 3/23/20 at 08:15;  Stop 3/28/20 at 

15:59;  Status DC


Etomidate (Amidate) 8 mg 1X  ONCE IV  Last administered on 3/23/20at 08:33;  

Start 3/23/20 at 08:30;  Stop 3/23/20 at 08:31;  Status DC


Succinylcholine Chloride (Anectine) 120 mg 1X  ONCE IV  Last administered on 

3/23/20at 08:34;  Start 3/23/20 at 08:30;  Stop 3/23/20 at 08:31;  Status DC


Midazolam HCl (Versed) 5 mg 1X  ONCE IV ;  Start 3/23/20 at 08:30;  Stop 3/23/20

at 08:31;  Status DC


Potassium Chloride 15 meq/ Bicarbonate Dialysis Soln w/ out KCl 5,007.5 ml  @ 

1,000 mls/ hr Q5H1M IV  Last administered on 3/24/20at 11:11;  Start 3/23/20 at 

12:00;  Stop 3/24/20 at 11:15;  Status DC


Potassium Chloride 15 meq/ Bicarbonate Dialysis Soln w/ out KCl 5,007.5 ml  @ 

1,000 mls/ hr Q5H1M IV  Last administered on 3/24/20at 11:12;  Start 3/23/20 at 

12:00;  Stop 3/24/20 at 11:17;  Status DC


Potassium Chloride 15 meq/ Bicarbonate Dialysis Soln w/ out KCl 5,007.5 ml  @ 

1,000 mls/ hr Q5H1M IV  Last administered on 3/24/20at 11:11;  Start 3/23/20 at 

12:00;  Stop 3/24/20 at 11:19;  Status DC


Sodium Chloride 90 meq/Potassium Chloride 15 meq/ Potassium Phosphate 10 mmol/ 

Magnesium Sulfate 10 meq/Calcium Gluconate 20 meq/ Multivitamins 10 ml/Chromium/

Copper/Manganese/ Seleni/Zn 0.5 ml/ Total Parenteral Nutrition/Amino 

Acids/Dextrose/ Fat Emulsion Intravenous 1,400 ml @  58.333 mls/ hr TPN  CONT IV

 Last administered on 3/23/20at 21:42;  Start 3/23/20 at 22:00;  Stop 3/24/20 at

21:59;  Status DC


Heparin Sodium (Porcine) (Heparin Sodium) 5,000 unit Q8HRS SQ  Last administered

on 3/28/20at 05:55;  Start 3/23/20 at 15:00;  Stop 3/28/20 at 13:28;  Status DC


Meropenem 500 mg/ Sodium Chloride 50 ml @  100 mls/hr Q6HRS IV  Last 

administered on 3/25/20at 06:00;  Start 3/24/20 at 09:00;  Stop 3/25/20 at 

07:29;  Status DC


Potassium Phosphate 20 mmol/ Sodium Chloride 106.6667 ml @  51.667 m... 1X  ONCE

IV  Last administered on 3/24/20at 11:22;  Start 3/24/20 at 10:15;  Stop 3/24/20

at 12:18;  Status DC


Acetaminophen (Tylenol Supp) 650 mg PRN Q6HRS  PRN WA MILD PAIN / TEMP > 100.3'F

Last administered on 7/15/20at 19:52;  Start 3/24/20 at 10:30


Potassium Chloride/Water 100 ml @  100 mls/hr Q1H IV  Last administered on 

3/24/20at 12:12;  Start 3/24/20 at 11:00;  Stop 3/24/20 at 12:59;  Status DC


Potassium Chloride 20 meq/ Bicarbonate Dialysis Soln w/ out KCl 5,010 ml @  

1,000 mls/hr Q5H1M IV  Last administered on 3/25/20at 08:48;  Start 3/24/20 at 

12:00;  Stop 3/25/20 at 13:03;  Status DC


Potassium Chloride 20 meq/ Bicarbonate Dialysis Soln w/ out KCl 5,010 ml @  

1,000 mls/hr Q5H1M IV  Last administered on 3/29/20at 14:52;  Start 3/24/20 at 

11:30;  Stop 3/29/20 at 19:59;  Status DC


Potassium Chloride 20 meq/ Bicarbonate Dialysis Soln w/ out KCl 5,010 ml @  

1,000 mls/hr Q5H1M IV  Last administered on 3/29/20at 14:53;  Start 3/24/20 at 

11:30;  Stop 3/29/20 at 19:59;  Status DC


Sodium Chloride 90 meq/Potassium Chloride 15 meq/ Potassium Phosphate 15 mmol/ 

Magnesium Sulfate 10 meq/Calcium Gluconate 15 meq/ Multivitamins 10 ml/Chromium/

Copper/Manganese/ Seleni/Zn 0.5 ml/ Total Parenteral Nutrition/Amino 

Acids/Dextrose/ Fat Emulsion Intravenous 1,400 ml @  58.333 mls/ hr TPN  CONT IV

 Last administered on 3/24/20at 22:17;  Start 3/24/20 at 22:00;  Stop 3/25/20 at

21:59;  Status DC


Cefepime HCl (Maxipime) 2 gm Q12HR IVP  Last administered on 4/7/20at 20:56;  

Start 3/25/20 at 09:00;  Stop 4/8/20 at 09:58;  Status DC


Daptomycin 500 mg/ Sodium Chloride 50 ml @  100 mls/hr Q48H IV  Last 

administered on 4/10/20at 09:57;  Start 3/25/20 at 08:30;  Stop 4/10/20 at 

10:07;  Status DC


Lidocaine HCl (Buffered Lidocaine 1%) 3 ml 1X  ONCE INJ  Last administered on 

3/25/20at 10:27;  Start 3/25/20 at 10:30;  Stop 3/25/20 at 10:31;  Status DC


Potassium Phosphate 20 mmol/ Sodium Chloride 106.6667 ml @  51.667 m... 1X  ONCE

IV  Last administered on 3/25/20at 12:51;  Start 3/25/20 at 13:00;  Stop 3/25/20

at 15:03;  Status DC


Sodium Chloride 90 meq/Potassium Chloride 15 meq/ Potassium Phosphate 18 mmol/ 

Magnesium Sulfate 8 meq/Calcium Gluconate 15 meq/ Multivitamins 10 ml/Chromium/ 

Copper/Manganese/ Seleni/Zn 0.5 ml/ Total Parenteral Nutrition/Amino 

Acids/Dextrose/ Fat Emulsion Intravenous 1,400 ml @  58.333 mls/ hr TPN  CONT IV

 Last administered on 3/25/20at 22:16;  Start 3/25/20 at 22:00;  Stop 3/26/20 at

21:59;  Status DC


Potassium Chloride 20 meq/ Bicarbonate Dialysis Soln w/ out KCl 5,010 ml @  

1,000 mls/hr Q5H1M IV  Last administered on 3/29/20at 14:54;  Start 3/25/20 at 

16:00;  Stop 3/29/20 at 19:59;  Status DC


Multi-Ingred Cream/Lotion/Oil/ Oint (Artificial Tears Eye Ointment) 1 thomas PRN 

Q1HR  PRN OU DRY EYE, 2nd choice Last administered on 4/13/20at 08:19;  Start 

3/25/20 at 17:30;  Stop 6/3/20 at 14:39;  Status DC


Sodium Chloride 90 meq/Potassium Chloride 15 meq/ Potassium Phosphate 18 mmol/ 

Magnesium Sulfate 8 meq/Calcium Gluconate 15 meq/ Multivitamins 10 ml/Chromium/ 

Copper/Manganese/ Seleni/Zn 0.5 ml/ Total Parenteral Nutrition/Amino 

Acids/Dextrose/ Fat Emulsion Intravenous 1,400 ml @  58.333 mls/ hr TPN  CONT IV

 Last administered on 3/26/20at 22:00;  Start 3/26/20 at 22:00;  Stop 3/27/20 at

21:59;  Status DC


Albumin Human 500 ml @  125 mls/hr 1X  ONCE IV ;  Start 3/26/20 at 14:15;  Stop 

3/26/20 at 18:14;  Status DC


Sodium Chloride 90 meq/Potassium Chloride 15 meq/ Potassium Phosphate 18 mmol/ 

Magnesium Sulfate 8 meq/Calcium Gluconate 15 meq/ Multivitamins 10 ml/Chromium/ 

Copper/Manganese/ Seleni/Zn 0.5 ml/ Insulin Human Regular 10 unit/ Total 

Parenteral Nutrition/Amino Acids/Dextrose/ Fat Emulsion Intravenous 1,400 ml @  

58.333 mls/ hr TPN  CONT IV  Last administered on 3/27/20at 21:43;  Start 

3/27/20 at 22:00;  Stop 3/28/20 at 21:59;  Status DC


Lidocaine HCl (Buffered Lidocaine 1%) 3 ml STK-MED ONCE .ROUTE ;  Start 3/25/20 

at 10:00;  Stop 3/27/20 at 13:57;  Status DC


Midazolam HCl 100 mg/Sodium Chloride 100 ml @ 7 mls/hr CONT  PRN IV SEE PROTOCOL

Last administered on 4/8/20at 15:35;  Start 3/28/20 at 16:00;  Stop 6/3/20 at 

14:38;  Status DC


Sodium Chloride 90 meq/Potassium Chloride 15 meq/ Potassium Phosphate 18 mmol/ 

Magnesium Sulfate 8 meq/Calcium Gluconate 15 meq/ Multivitamins 10 ml/Chromium/ 

Copper/Manganese/ Seleni/Zn 0.5 ml/ Insulin Human Regular 15 unit/ Total 

Parenteral Nutrition/Amino Acids/Dextrose/ Fat Emulsion Intravenous 1,400 ml @  

58.333 mls/ hr TPN  CONT IV  Last administered on 3/28/20at 20:34;  Start 

3/28/20 at 22:00;  Stop 3/29/20 at 21:59;  Status DC


Info ([facility] Electrolyte Protocol) 1 ea CONT PRN  PRN MC PER PROTOCOL;  Start 

3/29/20 at 13:15


Sodium Chloride 90 meq/Potassium Chloride 15 meq/ Potassium Phosphate 18 mmol/ 

Magnesium Sulfate 8 meq/Calcium Gluconate 15 meq/ Multivitamins 10 ml/Chromium/ 

Copper/Manganese/ Seleni/Zn 0.5 ml/ Insulin Human Regular 15 unit/ Total 

Parenteral Nutrition/Amino Acids/Dextrose/ Fat Emulsion Intravenous 1,400 ml @  

58.333 mls/ hr TPN  CONT IV  Last administered on 3/29/20at 22:05;  Start 

3/29/20 at 22:00;  Stop 3/30/20 at 21:59;  Status DC


Potassium Chloride 15 meq/ Bicarbonate Dialysis Soln w/ out KCl 5,007.5 ml  @ 

1,000 mls/ hr Q5H1M IV  Last administered on 4/1/20at 18:14;  Start 3/29/20 at 

20:00;  Stop 4/2/20 at 13:08;  Status DC


Potassium Chloride 15 meq/ Bicarbonate Dialysis Soln w/ out KCl 5,007.5 ml  @ 

1,000 mls/ hr Q5H1M IV  Last administered on 4/1/20at 18:14;  Start 3/29/20 at 

20:00;  Stop 4/2/20 at 13:08;  Status DC


Potassium Chloride 15 meq/ Bicarbonate Dialysis Soln w/ out KCl 5,007.5 ml  @ 

1,000 mls/ hr Q5H1M IV  Last administered on 4/1/20at 18:14;  Start 3/29/20 at 

20:00;  Stop 4/2/20 at 13:08;  Status DC


Iohexol (Omnipaque 240 Mg/ml) 30 ml 1X  ONCE PO  Last administered on 3/30/20at 

11:30;  Start 3/30/20 at 11:30;  Stop 3/30/20 at 11:33;  Status DC


Info (CONTRAST GIVEN -- Rx MONITORING) 1 each PRN DAILY  PRN MC SEE COMMENTS;  

Start 3/30/20 at 11:45;  Stop 4/1/20 at 11:44;  Status DC


Sodium Chloride 90 meq/Potassium Chloride 15 meq/ Potassium Phosphate 18 mmol/ 

Magnesium Sulfate 8 meq/Calcium Gluconate 15 meq/ Multivitamins 10 ml/Chromium/ 

Copper/Manganese/ Seleni/Zn 0.5 ml/ Insulin Human Regular 15 unit/ Total 

Parenteral Nutrition/Amino Acids/Dextrose/ Fat Emulsion Intravenous 1,400 ml @  

58.333 mls/ hr TPN  CONT IV  Last administered on 3/30/20at 21:47;  Start 

3/30/20 at 22:00;  Stop 3/31/20 at 21:59;  Status DC


Sodium Chloride 90 meq/Potassium Chloride 15 meq/ Potassium Phosphate 18 mmol/ 

Magnesium Sulfate 8 meq/Calcium Gluconate 15 meq/ Multivitamins 10 ml/Chromium/ 

Copper/Manganese/ Seleni/Zn 0.5 ml/ Insulin Human Regular 20 unit/ Total 

Parenteral Nutrition/Amino Acids/Dextrose/ Fat Emulsion Intravenous 1,400 ml @  

58.333 mls/ hr TPN  CONT IV  Last administered on 3/31/20at 21:36;  Start 

3/31/20 at 22:00;  Stop 4/1/20 at 21:59;  Status DC


Alteplase, Recombinant (Cathflo For Central Catheter Clearance) 1 mg 1X  ONCE 

INT CAT  Last administered on 3/31/20at 20:03;  Start 3/31/20 at 19:30;  Stop 

3/31/20 at 19:46;  Status DC


Alteplase, Recombinant (Cathflo For Central Catheter Clearance) 1 mg 1X  ONCE I

NT CAT  Last administered on 3/31/20at 22:05;  Start 3/31/20 at 22:00;  Stop 

3/31/20 at 22:01;  Status DC


Sodium Chloride 90 meq/Potassium Chloride 15 meq/ Potassium Phosphate 18 mmol/ 

Magnesium Sulfate 8 meq/Calcium Gluconate 15 meq/ Multivitamins 10 ml/Chromium/ 

Copper/Manganese/ Seleni/Zn 0.5 ml/ Insulin Human Regular 20 unit/ Total 

Parenteral Nutrition/Amino Acids/Dextrose/ Fat Emulsion Intravenous 1,400 ml @  

58.333 mls/ hr TPN  CONT IV  Last administered on 4/1/20at 21:30;  Start 4/1/20 

at 22:00;  Stop 4/2/20 at 21:59;  Status DC


Dexmedetomidine HCl 400 mcg/ Sodium Chloride 100 ml @ 0 mls/hr CONT  PRN IV 

ANXIETY / AGITATION Last administered on 5/30/20at 12:57;  Start 4/2/20 at 

08:15;  Stop 5/30/20 at 18:31;  Status DC


Sodium Chloride 500 ml @  500 mls/hr 1X PRN  PRN IV ELEVATED BP, SEE COMMENTS;  

Start 4/2/20 at 08:15


Atropine Sulfate (ATROPINE 0.5mg SYRINGE) 0.5 mg PRN Q5MIN  PRN IV SEE COMMENTS;

 Start 4/2/20 at 08:15


Furosemide (Lasix) 20 mg 1X  ONCE IVP  Last administered on 4/2/20at 08:19;  

Start 4/2/20 at 08:15;  Stop 4/2/20 at 08:16;  Status DC


Lidocaine HCl (Buffered Lidocaine 1%) 3 ml STK-MED ONCE .ROUTE ;  Start 4/2/20 

at 08:39;  Stop 4/2/20 at 08:39;  Status DC


Lidocaine HCl (Buffered Lidocaine 1%) 6 ml 1X  ONCE INJ  Last administered on 

4/2/20at 09:05;  Start 4/2/20 at 09:00;  Stop 4/2/20 at 09:06;  Status DC


Sodium Chloride 90 meq/Potassium Chloride 15 meq/ Potassium Phosphate 18 mmol/ 

Magnesium Sulfate 8 meq/Calcium Gluconate 15 meq/ Multivitamins 10 ml/Chromium/ 

Copper/Manganese/ Seleni/Zn 0.5 ml/ Insulin Human Regular 20 unit/ Total 

Parenteral Nutrition/Amino Acids/Dextrose/ Fat Emulsion Intravenous 1,400 ml @  

58.333 mls/ hr TPN  CONT IV  Last administered on 4/2/20at 22:45;  Start 4/2/20 

at 22:00;  Stop 4/3/20 at 21:59;  Status DC


Sodium Chloride 1,000 ml @  1,000 mls/hr Q1H PRN IV hypotension;  Start 4/3/20 

at 07:30;  Stop 4/3/20 at 13:29;  Status DC


Albumin Human 200 ml @  200 mls/hr 1X PRN  PRN IV Hypotension Last administered 

on 4/3/20at 09:36;  Start 4/3/20 at 07:30;  Stop 4/3/20 at 13:29;  Status DC


Sodium Chloride (Normal Saline Flush) 10 ml 1X PRN  PRN IV AP catheter pack;  

Start 4/3/20 at 07:30;  Stop 4/3/20 at 21:29;  Status DC


Sodium Chloride (Normal Saline Flush) 10 ml 1X PRN  PRN IV  catheter pack;  

Start 4/3/20 at 07:30;  Stop 4/4/20 at 07:29;  Status DC


Sodium Chloride 1,000 ml @  400 mls/hr Q2H30M PRN IV PATENCY;  Start 4/3/20 at 

07:30;  Stop 4/3/20 at 19:29;  Status DC


Info (PHARMACY MONITORING -- do not chart) 1 each PRN DAILY  PRN MC SEE 

COMMENTS;  Start 4/3/20 at 07:30;  Stop 4/3/20 at 13:02;  Status DC


Info (PHARMACY MONITORING -- do not chart) 1 each PRN DAILY  PRN MC SEE 

COMMENTS;  Start 4/3/20 at 07:30;  Stop 4/5/20 at 12:45;  Status DC


Sodium Chloride 90 meq/Potassium Chloride 15 meq/ Potassium Phosphate 10 mmol/ M

agnesium Sulfate 8 meq/Calcium Gluconate 15 meq/ Multivitamins 10 ml/Chromium/ 

Copper/Manganese/ Seleni/Zn 0.5 ml/ Insulin Human Regular 25 unit/ Total 

Parenteral Nutrition/Amino Acids/Dextrose/ Fat Emulsion Intravenous 1,400 ml @  

58.333 mls/ hr TPN  CONT IV  Last administered on 4/3/20at 22:19;  Start 4/3/20 

at 22:00;  Stop 4/4/20 at 21:59;  Status DC


Heparin Sodium (Porcine) (Heparin Sodium) 5,000 unit Q12HR SQ  Last administered

on 4/26/20at 08:59;  Start 4/3/20 at 21:00;  Stop 4/26/20 at 10:05;  Status DC


Ondansetron HCl (Zofran) 4 mg PRN Q6HRS  PRN IV NAUSEA/VOMITING;  Start 4/6/20 

at 07:00;  Stop 4/7/20 at 06:59;  Status DC


Fentanyl Citrate (Fentanyl 2ml Vial) 25 mcg PRN Q5MIN  PRN IV MILD PAIN 1-3;  

Start 4/6/20 at 07:00;  Stop 4/7/20 at 06:59;  Status DC


Fentanyl Citrate (Fentanyl 2ml Vial) 50 mcg PRN Q5MIN  PRN IV MODERATE TO SEVERE

PAIN;  Start 4/6/20 at 07:00;  Stop 4/7/20 at 06:59;  Status DC


Ringer's Solution 1,000 ml @  30 mls/hr Q24H IV ;  Start 4/6/20 at 07:00;  Stop 

4/6/20 at 18:59;  Status DC


Lidocaine HCl (Xylocaine-Mpf 1% 2ml Vial) 2 ml PRN 1X  PRN ID PRIOR TO IV START;

 Start 4/6/20 at 07:00;  Stop 4/7/20 at 06:59;  Status DC


Prochlorperazine Edisylate (Compazine) 5 mg PACU PRN  PRN IV NAUSEA, MRX1;  

Start 4/6/20 at 07:00;  Stop 4/7/20 at 06:59;  Status DC


Sodium Chloride 1,000 ml @  1,000 mls/hr Q1H PRN IV hypotension;  Start 4/4/20 

at 09:10;  Stop 4/4/20 at 15:09;  Status DC


Albumin Human 200 ml @  200 mls/hr 1X PRN  PRN IV Hypotension Last administered 

on 4/4/20at 10:10;  Start 4/4/20 at 09:15;  Stop 4/4/20 at 15:14;  Status DC


Sodium Chloride 1,000 ml @  400 mls/hr Q2H30M PRN IV PATENCY;  Start 4/4/20 at 

09:10;  Stop 4/4/20 at 21:09;  Status DC


Info (PHARMACY MONITORING -- do not chart) 1 each PRN DAILY  PRN MC SEE 

COMMENTS;  Start 4/4/20 at 09:15;  Stop 4/5/20 at 12:45;  Status DC


Info (PHARMACY MONITORING -- do not chart) 1 each PRN DAILY  PRN MC SEE 

COMMENTS;  Start 4/4/20 at 09:15;  Stop 4/5/20 at 12:45;  Status DC


Sodium Chloride 90 meq/Potassium Chloride 15 meq/ Potassium Phosphate 10 mmol/ 

Magnesium Sulfate 8 meq/Calcium Gluconate 15 meq/ Multivitamins 10 ml/Chromium/ 

Copper/Manganese/ Seleni/Zn 0.5 ml/ Insulin Human Regular 25 unit/ Total 

Parenteral Nutrition/Amino Acids/Dextrose/ Fat Emulsion Intravenous 1,400 ml @  

58.333 mls/ hr TPN  CONT IV  Last administered on 4/4/20at 22:10;  Start 4/4/20 

at 22:00;  Stop 4/5/20 at 21:59;  Status DC


Magnesium Sulfate 50 ml @ 25 mls/hr PRN DAILY  PRN IV for Mag < 1.7 on am labs 

Last administered on 6/18/20at 10:57;  Start 4/5/20 at 09:15


Sodium Chloride 90 meq/Potassium Chloride 15 meq/ Potassium Phosphate 10 mmol/ 

Magnesium Sulfate 8 meq/Calcium Gluconate 15 meq/ Multivitamins 10 ml/Chromium/ 

Copper/Manganese/ Seleni/Zn 0.5 ml/ Insulin Human Regular 25 unit/ Total 

Parenteral Nutrition/Amino Acids/Dextrose/ Fat Emulsion Intravenous 1,400 ml @  

58.333 mls/ hr TPN  CONT IV  Last administered on 4/5/20at 21:20;  Start 4/5/20 

at 22:00;  Stop 4/6/20 at 21:59;  Status DC


Sodium Chloride 1,000 ml @  1,000 mls/hr Q1H PRN IV hypotension;  Start 4/5/20 

at 12:23;  Stop 4/5/20 at 18:22;  Status DC


Albumin Human 200 ml @  200 mls/hr 1X  ONCE IV  Last administered on 4/5/20at 

13:34;  Start 4/5/20 at 12:30;  Stop 4/5/20 at 13:29;  Status DC


Diphenhydramine HCl (Benadryl) 25 mg 1X PRN  PRN IV ITCHING;  Start 4/5/20 at 

12:30;  Stop 4/6/20 at 12:29;  Status DC


Diphenhydramine HCl (Benadryl) 25 mg 1X PRN  PRN IV ITCHING;  Start 4/5/20 at 12

:30;  Stop 4/6/20 at 12:29;  Status DC


Info (PHARMACY MONITORING -- do not chart) 1 each PRN DAILY  PRN MC SEE 

COMMENTS;  Start 4/5/20 at 12:30;  Status Cancel


Bupivacaine HCl/ Epinephrine Bitart (Sensorcain-Epi 0.5%-1:099619 Mpf) 30 ml 

STK-MED ONCE .ROUTE  Last administered on 4/6/20at 11:44;  Start 4/6/20 at 

11:00;  Stop 4/6/20 at 11:01;  Status DC


Cellulose (Surgicel Fibrillar 1x2) 1 each STK-MED ONCE .ROUTE ;  Start 4/6/20 at

11:00;  Stop 4/6/20 at 11:01;  Status DC


Sodium Chloride 90 meq/Potassium Chloride 15 meq/ Potassium Phosphate 10 mmol/ 

Magnesium Sulfate 12 meq/Calcium Gluconate 15 meq/ Multivitamins 10 ml/Chromium/

Copper/Manganese/ Seleni/Zn 0.5 ml/ Insulin Human Regular 25 unit/ Total 

Parenteral Nutrition/Amino Acids/Dextrose/ Fat Emulsion Intravenous 1,400 ml @  

58.333 mls/ hr TPN  CONT IV  Last administered on 4/6/20at 22:24;  Start 4/6/20 

at 22:00;  Stop 4/7/20 at 21:59;  Status DC


Propofol 20 ml @ As Directed STK-MED ONCE IV ;  Start 4/6/20 at 11:07;  Stop 

4/6/20 at 11:07;  Status DC


Cellulose (Surgicel Hemostat 4x8) 1 each STK-MED ONCE .ROUTE  Last administered 

on 4/6/20at 11:44;  Start 4/6/20 at 11:55;  Stop 4/6/20 at 11:56;  Status DC


Sevoflurane (Ultane) 60 ml STK-MED ONCE IH ;  Start 4/6/20 at 12:46;  Stop 

4/6/20 at 12:46;  Status DC


Sodium Chloride 1,000 ml @  1,000 mls/hr Q1H PRN IV hypotension;  Start 4/6/20 

at 13:51;  Stop 4/6/20 at 19:50;  Status DC


Albumin Human 200 ml @  200 mls/hr 1X PRN  PRN IV Hypotension Last administered 

on 4/6/20at 14:51;  Start 4/6/20 at 14:00;  Stop 4/6/20 at 19:59;  Status DC


Diphenhydramine HCl (Benadryl) 25 mg 1X PRN  PRN IV ITCHING;  Start 4/6/20 at 

14:00;  Stop 4/7/20 at 13:59;  Status DC


Diphenhydramine HCl (Benadryl) 25 mg 1X PRN  PRN IV ITCHING;  Start 4/6/20 at 

14:00;  Stop 4/7/20 at 13:59;  Status DC


Sodium Chloride 1,000 ml @  400 mls/hr Q2H30M PRN IV PATENCY;  Start 4/6/20 at 

13:51;  Stop 4/7/20 at 01:50;  Status DC


Info (PHARMACY MONITORING -- do not chart) 1 each PRN DAILY  PRN MC SEE 

COMMENTS;  Start 4/6/20 at 14:00;  Stop 4/9/20 at 08:16;  Status DC


Heparin Sodium (Porcine) (Hep Lock Adult) 500 unit STK-MED ONCE IVP ;  Start 

4/7/20 at 09:29;  Stop 4/7/20 at 09:30;  Status DC


Sodium Chloride 1,000 ml @  1,000 mls/hr Q1H PRN IV hypotension;  Start 4/7/20 

at 10:43;  Stop 4/7/20 at 16:42;  Status DC


Sodium Chloride 1,000 ml @  400 mls/hr Q2H30M PRN IV PATENCY;  Start 4/7/20 at 

10:43;  Stop 4/7/20 at 22:42;  Status DC


Info (PHARMACY MONITORING -- do not chart) 1 each PRN DAILY  PRN MC SEE 

COMMENTS;  Start 4/7/20 at 10:45;  Status UNV


Info (PHARMACY MONITORING -- do not chart) 1 each PRN DAILY  PRN MC SEE 

COMMENTS;  Start 4/7/20 at 10:45;  Status UNV


Sodium Chloride 90 meq/Potassium Chloride 15 meq/ Magnesium Sulfate 12 

meq/Calcium Gluconate 15 meq/ Multivitamins 10 ml/Chromium/ Copper/Manganese/ 

Seleni/Zn 0.5 ml/ Insulin Human Regular 25 unit/ Total Parenteral 

Nutrition/Amino Acids/Dextrose/ Fat Emulsion Intravenous 1,400 ml @  58.333 mls/

hr TPN  CONT IV  Last administered on 4/7/20at 22:13;  Start 4/7/20 at 22:00;  

Stop 4/8/20 at 21:59;  Status DC


Sodium Chloride 1,000 ml @  1,000 mls/hr Q1H PRN IV hypotension;  Start 4/8/20 

at 07:50;  Stop 4/8/20 at 13:49;  Status DC


Albumin Human 200 ml @  200 mls/hr 1X  ONCE IV ;  Start 4/8/20 at 08:00;  Stop 

4/8/20 at 08:53;  Status DC


Diphenhydramine HCl (Benadryl) 25 mg 1X PRN  PRN IV ITCHING;  Start 4/8/20 at 

08:00;  Stop 4/9/20 at 07:59;  Status DC


Diphenhydramine HCl (Benadryl) 25 mg 1X PRN  PRN IV ITCHING;  Start 4/8/20 at 

08:00;  Stop 4/9/20 at 07:59;  Status DC


Info (PHARMACY MONITORING -- do not chart) 1 each PRN DAILY  PRN MC SEE 

COMMENTS;  Start 4/8/20 at 08:00;  Stop 4/9/20 at 08:16;  Status DC


Albumin Human 50 ml @ 50 mls/hr 1X  ONCE IV ;  Start 4/8/20 at 08:53;  Stop 

4/8/20 at 08:56;  Status DC


Albumin Human 200 ml @  50 mls/hr PRN 1X  PRN IV HYPOTENSION Last administered 

on 4/14/20at 11:54;  Start 4/8/20 at 09:00;  Stop 5/21/20 at 11:14;  Status DC


Meropenem 500 mg/ Sodium Chloride 50 ml @  100 mls/hr Q12H IV  Last administered

on 4/28/20at 10:45;  Start 4/8/20 at 10:00;  Stop 4/28/20 at 12:37;  Status DC


Sodium Chloride 90 meq/Magnesium Sulfate 12 meq/ Calcium Gluconate 15 meq/ 

Multivitamins 10 ml/Chromium/ Copper/Manganese/ Seleni/Zn 0.5 ml/ Insulin Human 

Regular 25 unit/ Total Parenteral Nutrition/Amino Acids/Dextrose/ Fat Emulsion 

Intravenous 1,400 ml @  58.333 mls/ hr TPN  CONT IV  Last administered on 

4/8/20at 21:41;  Start 4/8/20 at 22:00;  Stop 4/9/20 at 21:59;  Status DC


Sodium Chloride 1,000 ml @  1,000 mls/hr Q1H PRN IV hypotension;  Start 4/9/20 

at 07:58;  Stop 4/9/20 at 13:57;  Status DC


Albumin Human 200 ml @  200 mls/hr 1X PRN  PRN IV Hypotension Last administered 

on 4/9/20at 09:30;  Start 4/9/20 at 08:00;  Stop 4/9/20 at 13:59;  Status DC


Sodium Chloride 1,000 ml @  400 mls/hr Q2H30M PRN IV PATENCY;  Start 4/9/20 at 

07:58;  Stop 4/9/20 at 19:57;  Status DC


Info (PHARMACY MONITORING -- do not chart) 1 each PRN DAILY  PRN MC SEE 

COMMENTS;  Start 4/9/20 at 08:00;  Status Cancel


Info (PHARMACY MONITORING -- do not chart) 1 each PRN DAILY  PRN MC SEE 

COMMENTS;  Start 4/9/20 at 08:15;  Status UNV


Sodium Chloride 90 meq/Potassium Phosphate 5 mmol/ Magnesium Sulfate 12 

meq/Calcium Gluconate 15 meq/ Multivitamins 10 ml/Chromium/ Copper/Manganese/ 

Seleni/Zn 0.5 ml/ Insulin Human Regular 30 unit/ Total Parenteral 

Nutrition/Amino Acids/Dextrose/ Fat Emulsion Intravenous 1,400 ml @  58.333 mls/

hr TPN  CONT IV  Last administered on 4/9/20at 22:08;  Start 4/9/20 at 22:00;  

Stop 4/10/20 at 21:59;  Status DC


Linezolid/Dextrose 300 ml @  300 mls/hr Q12HR IV  Last administered on 4/20/20at

20:40;  Start 4/10/20 at 11:00;  Stop 4/21/20 at 08:10;  Status DC


Sodium Chloride 90 meq/Potassium Phosphate 15 mmol/ Magnesium Sulfate 12 

meq/Calcium Gluconate 15 meq/ Multivitamins 10 ml/Chromium/ Copper/Manganese/ 

Seleni/Zn 0.5 ml/ Insulin Human Regular 30 unit/ Total Parenteral 

Nutrition/Amino Acids/Dextrose/ Fat Emulsion Intravenous 1,400 ml @  58.333 mls/

hr TPN  CONT IV  Last administered on 4/10/20at 21:49;  Start 4/10/20 at 22:00; 

Stop 4/11/20 at 21:59;  Status DC


Sodium Chloride 90 meq/Potassium Phosphate 15 mmol/ Magnesium Sulfate 12 

meq/Calcium Gluconate 15 meq/ Multivitamins 10 ml/Chromium/ Copper/Manganese/ 

Seleni/Zn 0.5 ml/ Insulin Human Regular 40 unit/ Total Parenteral 

Nutrition/Amino Acids/Dextrose/ Fat Emulsion Intravenous 1,400 ml @  58.333 mls/

hr TPN  CONT IV  Last administered on 4/11/20at 21:21;  Start 4/11/20 at 22:00; 

Stop 4/12/20 at 21:59;  Status DC


Sodium Chloride 1,000 ml @  1,000 mls/hr Q1H PRN IV hypotension;  Start 4/11/20 

at 13:26;  Stop 4/11/20 at 19:25;  Status DC


Albumin Human 200 ml @  200 mls/hr 1X PRN  PRN IV Hypotension Last administered 

on 4/11/20at 15:00;  Start 4/11/20 at 13:30;  Stop 4/11/20 at 19:29;  Status DC


Sodium Chloride (Normal Saline Flush) 10 ml 1X PRN  PRN IV AP catheter pack;  

Start 4/11/20 at 13:30;  Stop 4/12/20 at 13:29;  Status DC


Sodium Chloride (Normal Saline Flush) 10 ml 1X PRN  PRN IV  catheter pack;  

Start 4/11/20 at 13:30;  Stop 4/12/20 at 13:29;  Status DC


Sodium Chloride 1,000 ml @  400 mls/hr Q2H30M PRN IV PATENCY;  Start 4/11/20 at 

13:26;  Stop 4/12/20 at 01:25;  Status DC


Info (PHARMACY MONITORING -- do not chart) 1 each PRN DAILY  PRN MC SEE 

COMMENTS;  Start 4/11/20 at 13:30;  Stop 4/11/20 at 13:33;  Status DC


Info (PHARMACY MONITORING -- do not chart) 1 each PRN DAILY  PRN MC SEE 

COMMENTS;  Start 4/11/20 at 13:30;  Stop 4/11/20 at 13:34;  Status DC


Sodium Chloride 90 meq/Potassium Phosphate 19 mmol/ Magnesium Sulfate 12 

meq/Calcium Gluconate 15 meq/ Multivitamins 10 ml/Chromium/ Copper/Manganese/ 

Seleni/Zn 0.5 ml/ Insulin Human Regular 40 unit/ Total Parenteral 

Nutrition/Amino Acids/Dextrose/ Fat Emulsion Intravenous 1,400 ml @  58.333 mls/

hr TPN  CONT IV  Last administered on 4/12/20at 21:54;  Start 4/12/20 at 22:00; 

Stop 4/13/20 at 21:59;  Status DC


Sodium Chloride 1,000 ml @  1,000 mls/hr Q1H PRN IV hypotension;  Start 4/13/20 

at 09:35;  Stop 4/13/20 at 15:34;  Status DC


Albumin Human 200 ml @  200 mls/hr 1X PRN  PRN IV Hypotension;  Start 4/13/20 at

09:45;  Stop 4/13/20 at 15:44;  Status DC


Diphenhydramine HCl (Benadryl) 25 mg 1X PRN  PRN IV ITCHING;  Start 4/13/20 at 

09:45;  Stop 4/14/20 at 09:44;  Status DC


Diphenhydramine HCl (Benadryl) 25 mg 1X PRN  PRN IV ITCHING;  Start 4/13/20 at 

09:45;  Stop 4/14/20 at 09:44;  Status DC


Sodium Chloride 1,000 ml @  400 mls/hr Q2H30M PRN IV PATENCY;  Start 4/13/20 at 

09:35;  Stop 4/13/20 at 21:34;  Status DC


Info (PHARMACY MONITORING -- do not chart) 1 each PRN DAILY  PRN MC SEE 

COMMENTS;  Start 4/13/20 at 09:45;  Status Cancel


Sodium Chloride 100 meq/Potassium Phosphate 19 mmol/ Magnesium Sulfate 12 

meq/Calcium Gluconate 15 meq/ Multivitamins 10 ml/Chromium/ Copper/Manganese/ 

Seleni/Zn 0.5 ml/ Insulin Human Regular 40 unit/ Potassium Chloride 20 meq/ 

Total Parenteral Nutrition/Amino Acids/Dextrose/ Fat Emulsion Intravenous 1,400 

ml @  58.333 mls/ hr TPN  CONT IV  Last administered on 4/13/20at 22:02;  Start 

4/13/20 at 22:00;  Stop 4/14/20 at 21:59;  Status DC


Furosemide (Lasix) 40 mg 1X  ONCE IVP  Last administered on 4/13/20at 14:39;  

Start 4/13/20 at 14:30;  Stop 4/13/20 at 14:31;  Status DC


Metronidazole 100 ml @  100 mls/hr Q8HRS IV  Last administered on 4/21/20at 

06:04;  Start 4/14/20 at 10:00;  Stop 4/21/20 at 08:10;  Status DC


Sodium Chloride 1,000 ml @  1,000 mls/hr Q1H PRN IV hypotension;  Start 4/14/20 

at 08:00;  Stop 4/14/20 at 13:59;  Status DC


Albumin Human 200 ml @  200 mls/hr 1X PRN  PRN IV Hypotension;  Start 4/14/20 at

08:00;  Stop 4/14/20 at 13:59;  Status DC


Sodium Chloride 1,000 ml @  400 mls/hr Q2H30M PRN IV PATENCY;  Start 4/14/20 at 

08:00;  Stop 4/14/20 at 19:59;  Status DC


Info (PHARMACY MONITORING -- do not chart) 1 each PRN DAILY  PRN MC SEE 

COMMENTS;  Start 4/14/20 at 11:30;  Status UNV


Info (PHARMACY MONITORING -- do not chart) 1 each PRN DAILY  PRN MC SEE 

COMMENTS;  Start 4/14/20 at 11:30;  Stop 4/16/20 at 12:13;  Status DC


Sodium Chloride 100 meq/Potassium Phosphate 19 mmol/ Magnesium Sulfate 12 

meq/Calcium Gluconate 15 meq/ Multivitamins 10 ml/Chromium/ Copper/Manganese/ 

Seleni/Zn 0.5 ml/ Insulin Human Regular 40 unit/ Potassium Chloride 20 meq/ 

Total Parenteral Nutrition/Amino Acids/Dextrose/ Fat Emulsion Intravenous 1,400 

ml @  58.333 mls/ hr TPN  CONT IV  Last administered on 4/14/20at 21:52;  Start 

4/14/20 at 22:00;  Stop 4/15/20 at 21:59;  Status DC


Sodium Chloride (Normal Saline Flush) 10 ml QSHIFT  PRN IV AFTER MEDS AND BLOOD 

DRAWS;  Start 4/14/20 at 15:00;  Stop 5/12/20 at 11:27;  Status DC


Sodium Chloride (Normal Saline Flush) 10 ml PRN Q5MIN  PRN IV AFTER MEDS AND 

BLOOD DRAWS;  Start 4/14/20 at 15:00


Sodium Chloride (Normal Saline Flush) 20 ml PRN Q5MIN  PRN IV AFTER MEDS AND 

BLOOD DRAWS;  Start 4/14/20 at 15:00


Sodium Chloride 100 meq/Potassium Phosphate 19 mmol/ Magnesium Sulfate 12 

meq/Calcium Gluconate 15 meq/ Multivitamins 10 ml/Chromium/ Copper/Manganese/ 

Seleni/Zn 0.5 ml/ Insulin Human Regular 40 unit/ Potassium Chloride 20 meq/ 

Total Parenteral Nutrition/Amino Acids/Dextrose/ Fat Emulsion Intravenous 1,400 

ml @  58.333 mls/ hr TPN  CONT IV  Last administered on 4/15/20at 21:20;  Start 

4/15/20 at 22:00;  Stop 4/16/20 at 21:59;  Status DC


Lidocaine HCl (Buffered Lidocaine 1%) 3 ml STK-MED ONCE .ROUTE ;  Start 4/15/20 

at 13:16;  Stop 4/15/20 at 13:16;  Status DC


Lidocaine HCl (Buffered Lidocaine 1%) 6 ml 1X  ONCE INJ  Last administered on 

4/15/20at 13:45;  Start 4/15/20 at 13:30;  Stop 4/15/20 at 13:31;  Status DC


Albumin Human 100 ml @  100 mls/hr 1X  ONCE IV  Last administered on 4/15/20at 

15:41;  Start 4/15/20 at 15:00;  Stop 4/15/20 at 15:59;  Status DC


Albumin Human 50 ml @ 50 mls/hr 1X  ONCE IV  Last administered on 4/15/20at 

15:00;  Start 4/15/20 at 15:00;  Stop 4/15/20 at 15:59;  Status DC


Info (PHARMACY MONITORING -- do not chart) 1 each PRN DAILY  PRN MC SEE 

COMMENTS;  Start 4/16/20 at 11:30;  Status Cancel


Info (PHARMACY MONITORING -- do not chart) 1 each PRN DAILY  PRN MC SEE 

COMMENTS;  Start 4/16/20 at 11:30;  Status UNV


Sodium Chloride 100 meq/Potassium Phosphate 10 mmol/ Magnesium Sulfate 12 

meq/Calcium Gluconate 15 meq/ Multivitamins 10 ml/Chromium/ Copper/Manganese/ 

Seleni/Zn 0.5 ml/ Insulin Human Regular 35 unit/ Potassium Chloride 20 meq/ 

Total Parenteral Nutrition/Amino Acids/Dextrose/ Fat Emulsion Intravenous 1,400 

ml @  58.333 mls/ hr TPN  CONT IV  Last administered on 4/16/20at 22:10;  Start 

4/16/20 at 22:00;  Stop 4/17/20 at 21:59;  Status DC


Sodium Chloride 100 meq/Potassium Phosphate 5 mmol/ Magnesium Sulfate 12 

meq/Calcium Gluconate 15 meq/ Multivitamins 10 ml/Chromium/ Copper/Manganese/ 

Seleni/Zn 0.5 ml/ Insulin Human Regular 35 unit/ Potassium Chloride 20 meq/ 

Total Parenteral Nutrition/Amino Acids/Dextrose/ Fat Emulsion Intravenous 1,400 

ml @  58.333 mls/ hr TPN  CONT IV  Last administered on 4/17/20at 22:59;  Start 

4/17/20 at 22:00;  Stop 4/18/20 at 21:59;  Status DC


Sodium Chloride 1,000 ml @  1,000 mls/hr Q1H PRN IV hypotension;  Start 4/18/20 

at 08:27;  Stop 4/18/20 at 14:26;  Status DC


Albumin Human 200 ml @  200 mls/hr 1X PRN  PRN IV Hypotension Last administered 

on 4/18/20at 09:18;  Start 4/18/20 at 08:30;  Stop 4/18/20 at 14:29;  Status DC


Sodium Chloride 1,000 ml @  400 mls/hr Q2H30M PRN IV PATENCY;  Start 4/18/20 at 

08:27;  Stop 4/18/20 at 20:26;  Status DC


Info (PHARMACY MONITORING -- do not chart) 1 each PRN DAILY  PRN MC SEE 

COMMENTS;  Start 4/18/20 at 08:30;  Status Cancel


Info (PHARMACY MONITORING -- do not chart) 1 each PRN DAILY  PRN MC SEE 

COMMENTS;  Start 4/18/20 at 08:30;  Stop 4/26/20 at 13:10;  Status DC


Sodium Chloride 100 meq/Potassium Chloride 40 meq/ Magnesium Sulfate 15 

meq/Calcium Gluconate 15 meq/ Multivitamins 10 ml/Chromium/ Copper/Manganese/ 

Seleni/Zn 0.5 ml/ Insulin Human Regular 35 unit/ Total Parenteral 

Nutrition/Amino Acids/Dextrose/ Fat Emulsion Intravenous 1,400 ml @  58.333 mls/

hr TPN  CONT IV  Last administered on 4/18/20at 22:00;  Start 4/18/20 at 22:00; 

Stop 4/19/20 at 21:59;  Status DC


Potassium Chloride/Water 100 ml @  100 mls/hr 1X  ONCE IV  Last administered on 

4/18/20at 17:28;  Start 4/18/20 at 14:45;  Stop 4/18/20 at 15:44;  Status DC


Sodium Chloride 100 meq/Potassium Chloride 40 meq/ Magnesium Sulfate 15 

meq/Calcium Gluconate 15 meq/ Multivitamins 10 ml/Chromium/ Copper/Manganese/ 

Seleni/Zn 0.5 ml/ Insulin Human Regular 35 unit/ Total Parenteral 

Nutrition/Amino Acids/Dextrose/ Fat Emulsion Intravenous 1,400 ml @  58.333 mls/

hr TPN  CONT IV  Last administered on 4/19/20at 22:46;  Start 4/19/20 at 22:00; 

Stop 4/20/20 at 21:59;  Status DC


Sodium Chloride 100 meq/Potassium Chloride 40 meq/ Magnesium Sulfate 20 

meq/Calcium Gluconate 15 meq/ Multivitamins 10 ml/Chromium/ Copper/Manganese/ S

haley/Zn 0.5 ml/ Insulin Human Regular 35 unit/ Total Parenteral Nutrition/Amino

Acids/Dextrose/ Fat Emulsion Intravenous 1,400 ml @  58.333 mls/ hr TPN  CONT IV

 Last administered on 4/20/20at 22:31;  Start 4/20/20 at 22:00;  Stop 4/21/20 at

21:59;  Status DC


Fentanyl Citrate (Fentanyl 2ml Vial) 50 mcg PRN Q2HR  PRN IVP PAIN Last 

administered on 4/27/20at 13:32;  Start 4/20/20 at 21:00;  Stop 4/28/20 at 

12:53;  Status DC


Fentanyl Citrate (Fentanyl 2ml Vial) 25 mcg PRN Q2HR  PRN IVP PAIN;  Start 

4/20/20 at 21:00;  Stop 4/28/20 at 12:54;  Status DC


Enoxaparin Sodium (Lovenox 100mg Syringe) 100 mg Q12HR SQ ;  Start 4/21/20 at 

21:00;  Status UNV


Amino Acids/ Glycerin/ Electrolytes 1,000 ml @  75 mls/hr [id number] IV ;  Start 

4/20/20 at 21:15;  Status UNV


Sodium Chloride 1,000 ml @  1,000 mls/hr Q1H PRN IV hypotension;  Start 4/21/20 

at 07:56;  Stop 4/21/20 at 13:55;  Status DC


Albumin Human 200 ml @  200 mls/hr 1X PRN  PRN IV Hypotension Last administered 

on 4/21/20at 08:40;  Start 4/21/20 at 08:00;  Stop 4/21/20 at 13:59;  Status DC


Sodium Chloride 1,000 ml @  400 mls/hr Q2H30M PRN IV PATENCY;  Start 4/21/20 at 

07:56;  Stop 4/21/20 at 19:55;  Status DC


Info (PHARMACY MONITORING -- do not chart) 1 each PRN DAILY  PRN MC SEE 

COMMENTS;  Start 4/21/20 at 08:00;  Status UNV


Info (PHARMACY MONITORING -- do not chart) 1 each PRN DAILY  PRN MC SEE 

COMMENTS;  Start 4/21/20 at 08:00;  Status UNV


Daptomycin 430 mg/ Sodium Chloride 50 ml @  100 mls/hr Q24H IV  Last 

administered on 4/21/20at 12:35;  Start 4/21/20 at 09:00;  Stop 4/21/20 at 

12:49;  Status DC


Sodium Chloride 100 meq/Potassium Chloride 40 meq/ Magnesium Sulfate 20 

meq/Calcium Gluconate 15 meq/ Multivitamins 10 ml/Chromium/ Copper/Manganese/ 

Seleni/Zn 0.5 ml/ Insulin Human Regular 35 unit/ Total Parenteral Nutrition/

Amino Acids/Dextrose/ Fat Emulsion Intravenous 1,400 ml @  58.333 mls/ hr TPN  

CONT IV  Last administered on 4/21/20at 21:26;  Start 4/21/20 at 22:00;  Stop 

4/22/20 at 21:59;  Status DC


Daptomycin 430 mg/ Sodium Chloride 50 ml @  100 mls/hr Q48H IV ;  Start 4/23/20 

at 09:00;  Stop 4/22/20 at 11:55;  Status DC


Sodium Chloride 100 meq/Potassium Chloride 40 meq/ Magnesium Sulfate 20 m

eq/Calcium Gluconate 15 meq/ Multivitamins 10 ml/Chromium/ Copper/Manganese/ 

Seleni/Zn 0.5 ml/ Insulin Human Regular 35 unit/ Total Parenteral 

Nutrition/Amino Acids/Dextrose/ Fat Emulsion Intravenous 1,400 ml @  58.333 mls/

hr TPN  CONT IV  Last administered on 4/22/20at 22:27;  Start 4/22/20 at 22:00; 

Stop 4/23/20 at 21:59;  Status DC


Daptomycin 430 mg/ Sodium Chloride 50 ml @  100 mls/hr Q24H IV  Last 

administered on 4/24/20at 15:07;  Start 4/22/20 at 13:00;  Stop 4/25/20 at 

13:15;  Status DC


Sodium Chloride 100 meq/Potassium Chloride 40 meq/ Magnesium Sulfate 20 

meq/Calcium Gluconate 10 meq/ Multivitamins 10 ml/Chromium/ Copper/Manganese/ 

Seleni/Zn 0.5 ml/ Insulin Human Regular 35 unit/ Total Parenteral 

Nutrition/Amino Acids/Dextrose/ Fat Emulsion Intravenous 1,400 ml @  58.333 mls/

hr TPN  CONT IV  Last administered on 4/24/20at 00:06;  Start 4/23/20 at 22:00; 

Stop 4/24/20 at 21:59;  Status DC


Alteplase, Recombinant (Cathflo For Central Catheter Clearance) 1 mg 1X  ONCE 

INT CAT  Last administered on 4/24/20at 11:44;  Start 4/24/20 at 10:45;  Stop 

4/24/20 at 10:46;  Status DC


Ondansetron HCl (Zofran) 4 mg PRN Q6HRS  PRN IV NAUSEA/VOMITING;  Start 4/27/20 

at 07:00;  Stop 4/28/20 at 06:59;  Status DC


Fentanyl Citrate (Fentanyl 2ml Vial) 25 mcg PRN Q5MIN  PRN IV MILD PAIN 1-3;  

Start 4/27/20 at 07:00;  Stop 4/28/20 at 06:59;  Status DC


Fentanyl Citrate (Fentanyl 2ml Vial) 50 mcg PRN Q5MIN  PRN IV MODERATE TO SEVERE

PAIN Last administered on 4/27/20at 10:17;  Start 4/27/20 at 07:00;  Stop 

4/28/20 at 06:59;  Status DC


Ringer's Solution 1,000 ml @  30 mls/hr Q24H IV ;  Start 4/27/20 at 07:00;  Stop

4/27/20 at 18:59;  Status DC


Lidocaine HCl (Xylocaine-Mpf 1% 2ml Vial) 2 ml PRN 1X  PRN ID PRIOR TO IV START;

 Start 4/27/20 at 07:00;  Stop 4/28/20 at 06:59;  Status DC


Prochlorperazine Edisylate (Compazine) 5 mg PACU PRN  PRN IV NAUSEA, MRX1;  

Start 4/27/20 at 07:00;  Stop 4/28/20 at 06:59;  Status DC


Sodium Acetate 50 meq/Potassium Acetate 55 meq/ Magnesium Sulfate 20 meq/Calcium

Gluconate 10 meq/ Multivitamins 10 ml/Chromium/ Copper/Manganese/ Seleni/Zn 0.5 

ml/ Insulin Human Regular 35 unit/ Total Parenteral Nutrition/Amino 

Acids/Dextrose/ Fat Emulsion Intravenous 1,400 ml @  58.333 mls/ hr TPN  CONT IV

;  Start 4/24/20 at 22:00;  Stop 4/24/20 at 14:15;  Status DC


Sodium Acetate 50 meq/Potassium Acetate 55 meq/ Magnesium Sulfate 20 meq/Calcium

Gluconate 10 meq/ Multivitamins 10 ml/Chromium/ Copper/Manganese/ Seleni/Zn 0.5 

ml/ Insulin Human Regular 35 unit/ Total Parenteral Nutrition/Amino 

Acids/Dextrose/ Fat Emulsion Intravenous 1,800 ml @  75 mls/hr TPN  CONT IV  

Last administered on 4/24/20at 22:38;  Start 4/24/20 at 22:00;  Stop 4/25/20 at 

21:59;  Status DC


Sodium Chloride 1,000 ml @  1,000 mls/hr Q1H PRN IV hypotension;  Start 4/24/20 

at 15:31;  Stop 4/24/20 at 21:30;  Status DC


Diphenhydramine HCl (Benadryl) 25 mg 1X PRN  PRN IV ITCHING;  Start 4/24/20 at 

15:45;  Stop 4/25/20 at 15:44;  Status DC


Diphenhydramine HCl (Benadryl) 25 mg 1X PRN  PRN IV ITCHING;  Start 4/24/20 at 

15:45;  Stop 4/25/20 at 15:44;  Status DC


Sodium Chloride 1,000 ml @  400 mls/hr Q2H30M PRN IV PATENCY;  Start 4/24/20 at 

15:31;  Stop 4/25/20 at 03:30;  Status DC


Info (PHARMACY MONITORING -- do not chart) 1 each PRN DAILY  PRN MC SEE 

COMMENTS;  Start 4/24/20 at 15:45;  Stop 5/26/20 at 14:14;  Status DC


Sodium Acetate 50 meq/Potassium Acetate 55 meq/ Magnesium Sulfate 20 meq/Calcium

Gluconate 10 meq/ Multivitamins 10 ml/Chromium/ Copper/Manganese/ Seleni/Zn 0.5 

ml/ Insulin Human Regular 35 unit/ Total Parenteral Nutrition/Amino 

Acids/Dextrose/ Fat Emulsion Intravenous 1,800 ml @  75 mls/hr TPN  CONT IV  

Last administered on 4/25/20at 22:03;  Start 4/25/20 at 22:00;  Stop 4/26/20 at 

21:59;  Status DC


Daptomycin 430 mg/ Sodium Chloride 50 ml @  100 mls/hr Q24H IV  Last 

administered on 4/30/20at 13:00;  Start 4/25/20 at 13:00;  Stop 4/30/20 at 

20:58;  Status DC


Heparin Sodium (Porcine) 1000 unit/Sodium Chloride 1,001 ml @  1,001 mls/hr 1X  

ONCE IRR ;  Start 4/27/20 at 06:00;  Stop 4/27/20 at 06:59;  Status DC


Potassium Acetate 55 meq/Magnesium Sulfate 20 meq/ Calcium Gluconate 10 meq/ 

Multivitamins 10 ml/Chromium/ Copper/Manganese/ Seleni/Zn 0.5 ml/ Insulin Human 

Regular 35 unit/ Total Parenteral Nutrition/Amino Acids/Dextrose/ Fat Emulsion 

Intravenous 1,920 ml @  80 mls/hr TPN  CONT IV  Last administered on 4/26/20at 

22:10;  Start 4/26/20 at 22:00;  Stop 4/27/20 at 21:59;  Status DC


Dexamethasone Sodium Phosphate (Decadron) 4 mg STK-MED ONCE .ROUTE ;  Start 

4/27/20 at 10:56;  Stop 4/27/20 at 10:57;  Status DC


Ondansetron HCl (Zofran) 4 mg STK-MED ONCE .ROUTE ;  Start 4/27/20 at 10:56;  

Stop 4/27/20 at 10:57;  Status DC


Rocuronium Bromide (Zemuron) 50 mg STK-MED ONCE .ROUTE ;  Start 4/27/20 at 

10:56;  Stop 4/27/20 at 10:57;  Status DC


Fentanyl Citrate (Fentanyl 2ml Vial) 100 mcg STK-MED ONCE .ROUTE ;  Start 

4/27/20 at 10:56;  Stop 4/27/20 at 10:57;  Status DC


Bupivacaine HCl/ Epinephrine Bitart (Sensorcain-Epi 0.5%-1:321987 Mpf) 30 ml 

STK-MED ONCE .ROUTE  Last administered on 4/27/20at 12:01;  Start 4/27/20 at 

10:58;  Stop 4/27/20 at 10:58;  Status DC


Cellulose (Surgicel Hemostat 2x14) 1 each STK-MED ONCE .ROUTE ;  Start 4/27/20 

at 10:58;  Stop 4/27/20 at 10:59;  Status DC


Iohexol (Omnipaque 300 Mg/ml) 50 ml STK-MED ONCE .ROUTE ;  Start 4/27/20 at 

10:58;  Stop 4/27/20 at 10:59;  Status DC


Cellulose (Surgicel Hemostat 4x8) 1 each STK-MED ONCE .ROUTE ;  Start 4/27/20 at

10:58;  Stop 4/27/20 at 10:59;  Status DC


Bisacodyl (Dulcolax Supp) 10 mg STK-MED ONCE .ROUTE ;  Start 4/27/20 at 10:59;  

Stop 4/27/20 at 10:59;  Status DC


Heparin Sodium (Porcine) 1000 unit/Sodium Chloride 1,001 ml @  1,001 mls/hr 1X  

ONCE IRR ;  Start 4/27/20 at 12:00;  Stop 4/27/20 at 12:59;  Status DC


Propofol 20 ml @ As Directed STK-MED ONCE IV ;  Start 4/27/20 at 11:05;  Stop 

4/27/20 at 11:05;  Status DC


Sevoflurane (Ultane) 90 ml STK-MED ONCE IH ;  Start 4/27/20 at 11:05;  Stop 

4/27/20 at 11:05;  Status DC


Sevoflurane (Ultane) 60 ml STK-MED ONCE IH ;  Start 4/27/20 at 12:26;  Stop 

4/27/20 at 12:27;  Status DC


Propofol 20 ml @ As Directed STK-MED ONCE IV ;  Start 4/27/20 at 12:26;  Stop 4/ 27/20 at 12:27;  Status DC


Phenylephrine HCl (PHENYLEPHRINE in 0.9% NACL PF) 1 mg STK-MED ONCE IV ;  Start 

4/27/20 at 12:34;  Stop 4/27/20 at 12:34;  Status DC


Heparin Sodium (Porcine) (Heparin Sodium) 5,000 unit Q12HR SQ  Last administered

on 5/6/20at 20:57;  Start 4/27/20 at 21:00;  Stop 5/7/20 at 09:59;  Status DC


Sodium Chloride (Normal Saline Flush) 3 ml QSHIFT  PRN IV AFTER MEDS AND BLOOD 

DRAWS;  Start 4/27/20 at 13:45;  Status Cancel


Naloxone HCl (Narcan) 0.4 mg PRN Q2MIN  PRN IV SEE INSTRUCTIONS Last 

administered on 6/6/20at 15:15;  Start 4/27/20 at 13:45;  Stop 7/1/20 at 16:00; 

Status DC


Sodium Chloride 1,000 ml @  25 mls/hr Q24H IV  Last administered on 5/26/20at 

13:37;  Start 4/27/20 at 13:37;  Stop 5/29/20 at 13:09;  Status DC


Naloxone HCl (Narcan) 0.4 mg PRN Q2MIN  PRN IV SEE INSTRUCTIONS;  Start 4/27/20 

at 14:30;  Status UNV


Sodium Chloride 1,000 ml @  25 mls/hr Q24H IV ;  Start 4/27/20 at 14:30;  Status

UNV


Hydromorphone HCl 30 ml @ 0 mls/hr CONT PRN  PRN IV PER PROTOCOL Last 

administered on 5/2/20at 16:08;  Start 4/27/20 at 14:30;  Stop 5/4/20 at 08:55; 

Status DC


Potassium Acetate 55 meq/Magnesium Sulfate 20 meq/ Calcium Gluconate 10 meq/ 

Multivitamins 10 ml/Chromium/ Copper/Manganese/ Seleni/Zn 0.5 ml/ Insulin Human 

Regular 35 unit/ Total Parenteral Nutrition/Amino Acids/Dextrose/ Fat Emulsion 

Intravenous 1,920 ml @  80 mls/hr TPN  CONT IV  Last administered on 4/27/20at 

22:01;  Start 4/27/20 at 22:00;  Stop 4/28/20 at 21:59;  Status DC


Bumetanide (Bumex) 2 mg [id number] IV  Last administered on 5/1/20at 13:50;  Start 

4/28/20 at 14:00;  Stop 5/2/20 at 14:10;  Status DC


Meropenem 1 gm/ Sodium Chloride 100 ml @  200 mls/hr Q8HRS IV  Last administered

on 5/22/20at 05:53;  Start 4/28/20 at 14:00;  Stop 5/22/20 at 09:31;  Status DC


Potassium Acetate 55 meq/Magnesium Sulfate 20 meq/ Calcium Gluconate 10 meq/ 

Multivitamins 10 ml/Chromium/ Copper/Manganese/ Seleni/Zn 0.5 ml/ Insulin Human 

Regular 35 unit/ Total Parenteral Nutrition/Amino Acids/Dextrose/ Fat Emulsion 

Intravenous 1,920 ml @  80 mls/hr TPN  CONT IV  Last administered on 4/28/20at 

22:02;  Start 4/28/20 at 22:00;  Stop 4/29/20 at 21:59;  Status DC


Hydromorphone HCl (Dilaudid Standard PCA) 12 mg STK-MED ONCE IV ;  Start 4/27/20

at 14:35;  Stop 4/28/20 at 13:53;  Status DC


Artificial Tears (Artificial Tears) 1 drop PRN Q15MIN  PRN OU DRY EYE Last 

administered on 6/23/20at 21:17;  Start 4/29/20 at 05:30


Hydromorphone HCl (Dilaudid Standard PCA) 12 mg STK-MED ONCE IV ;  Start 4/28/20

at 12:05;  Stop 4/29/20 at 09:15;  Status DC


Potassium Acetate 65 meq/Magnesium Sulfate 20 meq/ Calcium Gluconate 10 meq/ 

Multivitamins 10 ml/Chromium/ Copper/Manganese/ Seleni/Zn 0.5 ml/ Insulin Human 

Regular 30 unit/ Total Parenteral Nutrition/Amino Acids/Dextrose/ Fat Emulsion 

Intravenous 1,920 ml @  80 mls/hr TPN  CONT IV  Last administered on 4/29/20at 

22:22;  Start 4/29/20 at 22:00;  Stop 4/30/20 at 21:59;  Status DC


Cyclobenzaprine HCl (Flexeril) 10 mg PRN Q6HRS  PRN PO MUSCLE SPASMS Last 

administered on 7/10/20at 19:12;  Start 4/30/20 at 10:45


Potassium Acetate 55 meq/Magnesium Sulfate 20 meq/ Calcium Gluconate 10 meq/ 

Multivitamins 10 ml/Chromium/ Copper/Manganese/ Seleni/Zn 0.5 ml/ Insulin Human 

Regular 30 unit/ Total Parenteral Nutrition/Amino Acids/Dextrose/ Fat Emulsion 

Intravenous 1,920 ml @  80 mls/hr TPN  CONT IV  Last administered on 5/1/20at 

01:00;  Start 4/30/20 at 22:00;  Stop 5/1/20 at 21:59;  Status DC


Magnesium Sulfate 50 ml @ 25 mls/hr 1X  ONCE IV  Last administered on 4/30/20at 

17:18;  Start 4/30/20 at 12:45;  Stop 4/30/20 at 14:44;  Status DC


Potassium Chloride/Water 100 ml @  100 mls/hr 1X  ONCE IV  Last administered on 

5/1/20at 11:27;  Start 5/1/20 at 12:00;  Stop 5/1/20 at 12:59;  Status DC


Hydromorphone HCl (Dilaudid Standard PCA) 12 mg STK-MED ONCE IV ;  Start 4/29/20

at 10:50;  Stop 5/1/20 at 11:02;  Status DC


Hydromorphone HCl (Dilaudid Standard PCA) 12 mg STK-MED ONCE IV ;  Start 4/30/20

at 13:47;  Stop 5/1/20 at 11:03;  Status DC


Potassium Acetate 30 meq/Magnesium Sulfate 20 meq/ Calcium Gluconate 10 meq/ 

Multivitamins 10 ml/Chromium/ Copper/Manganese/ Seleni/Zn 0.5 ml/ Insulin Human 

Regular 30 unit/ Potassium Chloride 30 meq/ Total Parenteral Nutrition/Amino 

Acids/Dextrose/ Fat Emulsion Intravenous 1,920 ml @  80 mls/hr TPN  CONT IV  

Last administered on 5/1/20at 22:34;  Start 5/1/20 at 22:00;  Stop 5/2/20 at 

21:59;  Status DC


Potassium Chloride/Water 100 ml @  100 mls/hr Q1H IV  Last administered on 

5/2/20at 13:05;  Start 5/2/20 at 07:00;  Stop 5/2/20 at 10:59;  Status DC


Magnesium Sulfate 50 ml @ 25 mls/hr 1X  ONCE IV  Last administered on 5/2/20at 

10:34;  Start 5/2/20 at 10:30;  Stop 5/2/20 at 12:29;  Status DC


Potassium Chloride 75 meq/ Magnesium Sulfate 20 meq/Calcium Gluconate 10 meq/ 

Multivitamins 10 ml/Chromium/ Copper/Manganese/ Seleni/Zn 0.5 ml/ Insulin Human 

Regular 30 unit/ Total Parenteral Nutrition/Amino Acids/Dextrose/ Fat Emulsion 

Intravenous 1,920 ml @  80 mls/hr TPN  CONT IV  Last administered on 5/2/20at 

21:51;  Start 5/2/20 at 22:00;  Stop 5/3/20 at 22:00;  Status DC


Potassium Chloride 75 meq/ Magnesium Sulfate 20 meq/Calcium Gluconate 10 meq/ 

Multivitamins 10 ml/Chromium/ Copper/Manganese/ Seleni/Zn 0.5 ml/ Insulin Human 

Regular 25 unit/ Total Parenteral Nutrition/Amino Acids/Dextrose/ Fat Emulsion 

Intravenous 1,920 ml @  80 mls/hr TPN  CONT IV  Last administered on 5/3/20at 2

2:04;  Start 5/3/20 at 22:00;  Stop 5/4/20 at 21:59;  Status DC


Hydromorphone HCl (Dilaudid) 0.4 mg PRN Q4HRS  PRN IVP PAIN Last administered on

5/4/20at 10:57;  Start 5/4/20 at 09:00;  Stop 5/4/20 at 18:59;  Status DC


Micafungin Sodium 100 mg/Dextrose 100 ml @  100 mls/hr Q24H IV  Last 

administered on 5/26/20at 12:17;  Start 5/4/20 at 11:00;  Stop 5/27/20 at 09:59;

 Status DC


Daptomycin 485 mg/ Sodium Chloride 50 ml @  100 mls/hr Q24H IV  Last 

administered on 5/11/20at 13:10;  Start 5/4/20 at 11:00;  Stop 5/12/20 at 07:44;

 Status DC


Potassium Chloride 75 meq/ Magnesium Sulfate 15 meq/Calcium Gluconate 8 meq/ 

Multivitamins 10 ml/Chromium/ Copper/Manganese/ Seleni/Zn 0.5 ml/ Insulin Human 

Regular 25 unit/ Total Parenteral Nutrition/Amino Acids/Dextrose/ Fat Emulsion 

Intravenous 1,920 ml @  80 mls/hr TPN  CONT IV  Last administered on 5/4/20at 

23:08;  Start 5/4/20 at 22:00;  Stop 5/5/20 at 21:59;  Status DC


Haloperidol Lactate (Haldol Inj) 3 mg 1X  ONCE IVP  Last administered on 

5/4/20at 14:37;  Start 5/4/20 at 14:30;  Stop 5/4/20 at 14:31;  Status DC


Hydromorphone HCl (Dilaudid) 1 mg PRN Q4HRS  PRN IVP PAIN Last administered on 

5/18/20at 06:25;  Start 5/4/20 at 19:00;  Stop 5/18/20 at 17:10;  Status DC


Potassium Chloride 75 meq/ Magnesium Sulfate 15 meq/Calcium Gluconate 8 meq/ 

Multivitamins 10 ml/Chromium/ Copper/Manganese/ Seleni/Zn 0.5 ml/ Insulin Human 

Regular 20 unit/ Total Parenteral Nutrition/Amino Acids/Dextrose/ Fat Emulsion 

Intravenous 1,920 ml @  80 mls/hr TPN  CONT IV  Last administered on 5/5/20at 

22:10;  Start 5/5/20 at 22:00;  Stop 5/6/20 at 21:59;  Status DC


Lidocaine HCl (Buffered Lidocaine 1%) 3 ml STK-MED ONCE .ROUTE ;  Start 5/6/20 

at 11:31;  Stop 5/6/20 at 11:31;  Status DC


Lidocaine HCl (Buffered Lidocaine 1%) 3 ml STK-MED ONCE .ROUTE ;  Start 5/6/20 

at 12:28;  Stop 5/6/20 at 12:29;  Status DC


Lidocaine HCl (Buffered Lidocaine 1%) 6 ml 1X  ONCE INJ  Last administered on 

5/6/20at 12:53;  Start 5/6/20 at 12:45;  Stop 5/6/20 at 12:46;  Status DC


Potassium Chloride 75 meq/ Magnesium Sulfate 15 meq/Calcium Gluconate 8 meq/ 

Multivitamins 10 ml/Chromium/ Copper/Manganese/ Seleni/Zn 0.5 ml/ Insulin Human 

Regular 20 unit/ Total Parenteral Nutrition/Amino Acids/Dextrose/ Fat Emulsion 

Intravenous 1,920 ml @  80 mls/hr TPN  CONT IV  Last administered on 5/6/20at 

22:00;  Start 5/6/20 at 22:00;  Stop 5/7/20 at 21:59;  Status DC


Potassium Chloride 75 meq/ Magnesium Sulfate 15 meq/Calcium Gluconate 8 meq/ 

Multivitamins 10 ml/Chromium/ Copper/Manganese/ Seleni/Zn 0.5 ml/ Insulin Human 

Regular 15 unit/ Total Parenteral Nutrition/Amino Acids/Dextrose/ Fat Emulsion 

Intravenous 1,920 ml @  80 mls/hr TPN  CONT IV  Last administered on 5/7/20at 

22:28;  Start 5/7/20 at 22:00;  Stop 5/8/20 at 21:59;  Status DC


Vecuronium Bromide (Norcuron Bolus) 6 mg PRN Q6HRS  PRN IV VENT ASYNCHRONY;  

Start 5/7/20 at 19:15;  Stop 5/7/20 at 19:35;  Status DC


Bumetanide (Bumex) 2 mg 1X  ONCE IV  Last administered on 5/7/20at 22:09;  Start

5/7/20 at 19:45;  Stop 5/7/20 at 19:46;  Status DC


Lidocaine HCl (Buffered Lidocaine 1%) 3 ml STK-MED ONCE .ROUTE ;  Start 5/8/20 

at 07:59;  Stop 5/8/20 at 07:59;  Status DC


Midazolam HCl (Versed) 5 mg STK-MED ONCE .ROUTE ;  Start 5/8/20 at 08:36;  Stop 

5/8/20 at 08:36;  Status DC


Fentanyl Citrate (Fentanyl 5ml Vial) 250 mcg STK-MED ONCE .ROUTE ;  Start 5/8/20

at 08:36;  Stop 5/8/20 at 08:37;  Status DC


Lidocaine HCl (Buffered Lidocaine 1%) 3 ml 1X  ONCE IJ  Last administered on 

5/8/20at 09:30;  Start 5/8/20 at 09:15;  Stop 5/8/20 at 09:16;  Status DC


Midazolam HCl (Versed) 5 mg 1X  ONCE IV  Last administered on 5/8/20at 09:30;  

Start 5/8/20 at 09:15;  Stop 5/8/20 at 09:16;  Status DC


Fentanyl Citrate (Fentanyl 5ml Vial) 250 mcg 1X  ONCE IV  Last administered on 

5/8/20at 09:30;  Start 5/8/20 at 09:15;  Stop 5/8/20 at 09:16;  Status DC


Bumetanide (Bumex) 2 mg DAILY IV  Last administered on 5/18/20at 08:07;  Start 

5/8/20 at 10:00;  Stop 5/18/20 at 17:15;  Status DC


Potassium Chloride 75 meq/ Magnesium Sulfate 15 meq/ Multivitamins 10 

ml/Chromium/ Copper/Manganese/ Seleni/Zn 0.5 ml/ Insulin Human Regular 15 unit/ 

Total Parenteral Nutrition/Amino Acids/Dextrose/ Fat Emulsion Intravenous 1,920 

ml @  80 mls/hr TPN  CONT IV  Last administered on 5/8/20at 21:59;  Start 5/8/20

at 22:00;  Stop 5/9/20 at 21:59;  Status DC


Metoclopramide HCl (Reglan Vial) 10 mg PRN Q3HRS  PRN IVP NAUSEA/VOMITING-3rd 

choice Last administered on 5/14/20at 04:25;  Start 5/9/20 at 16:45


Potassium Chloride 75 meq/ Magnesium Sulfate 15 meq/ Multivitamins 10 

ml/Chromium/ Copper/Manganese/ Seleni/Zn 0.5 ml/ Insulin Human Regular 15 unit/ 

Total Parenteral Nutrition/Amino Acids/Dextrose/ Fat Emulsion Intravenous 1,920 

ml @  80 mls/hr TPN  CONT IV  Last administered on 5/9/20at 22:41;  Start 5/9/20

at 22:00;  Stop 5/10/20 at 21:59;  Status DC


Magnesium Sulfate 50 ml @ 25 mls/hr 1X  ONCE IV  Last administered on 5/10/20at 

10:44;  Start 5/10/20 at 09:00;  Stop 5/10/20 at 10:59;  Status DC


Potassium Chloride/Water 100 ml @  100 mls/hr 1X  ONCE IV  Last administered on 

5/10/20at 09:37;  Start 5/10/20 at 09:00;  Stop 5/10/20 at 09:59;  Status DC


Duloxetine HCl (Cymbalta) 30 mg DAILY PO  Last administered on 5/11/20at 09:48; 

Start 5/10/20 at 14:00;  Stop 5/13/20 at 10:25;  Status DC


Potassium Chloride 80 meq/ Magnesium Sulfate 20 meq/ Multivitamins 10 

ml/Chromium/ Copper/Manganese/ Seleni/Zn 0.5 ml/ Insulin Human Regular 15 unit/ 

Total Parenteral Nutrition/Amino Acids/Dextrose/ Fat Emulsion Intravenous 1,920 

ml @  80 mls/hr TPN  CONT IV  Last administered on 5/10/20at 21:42;  Start 

5/10/20 at 22:00;  Stop 5/11/20 at 21:59;  Status DC


Potassium Chloride 80 meq/ Magnesium Sulfate 20 meq/ Multivitamins 10 

ml/Chromium/ Copper/Manganese/ Seleni/Zn 0.5 ml/ Insulin Human Regular 15 unit/ 

Total Parenteral Nutrition/Amino Acids/Dextrose/ Fat Emulsion Intravenous 1,920 

ml @  80 mls/hr TPN  CONT IV  Last administered on 5/11/20at 22:20;  Start 

5/11/20 at 22:00;  Stop 5/12/20 at 21:59;  Status DC


Lidocaine HCl (Buffered Lidocaine 1%) 3 ml STK-MED ONCE .ROUTE ;  Start 5/12/20 

at 09:54;  Stop 5/12/20 at 09:55;  Status DC


Hydromorphone HCl (Dilaudid Standard PCA) 12 mg STK-MED ONCE IV ;  Start 5/1/20 

at 15:50;  Stop 5/12/20 at 11:24;  Status DC


Potassium Chloride 80 meq/ Magnesium Sulfate 20 meq/ Multivitamins 10 

ml/Chromium/ Copper/Manganese/ Seleni/Zn 0.5 ml/ Insulin Human Regular 15 unit/ 

Total Parenteral Nutrition/Amino Acids/Dextrose/ Fat Emulsion Intravenous 1,920 

ml @  80 mls/hr TPN  CONT IV  Last administered on 5/12/20at 21:40;  Start 

5/12/20 at 22:00;  Stop 5/13/20 at 21:59;  Status DC


Lidocaine HCl (Buffered Lidocaine 1%) 6 ml 1X  ONCE INJ  Last administered on 

5/12/20at 14:15;  Start 5/12/20 at 14:15;  Stop 5/12/20 at 14:16;  Status DC


Potassium Chloride 80 meq/ Magnesium Sulfate 20 meq/ Multivitamins 10 

ml/Chromium/ Copper/Manganese/ Seleni/Zn 1 ml/ Insulin Human Regular 15 unit/ 

Total Parenteral Nutrition/Amino Acids/Dextrose/ Fat Emulsion Intravenous 1,920 

ml @  80 mls/hr TPN  CONT IV  Last administered on 5/13/20at 22:04;  Start 

5/13/20 at 22:00;  Stop 5/14/20 at 21:59;  Status DC


Potassium Chloride/Water 100 ml @  100 mls/hr 1X  ONCE IV  Last administered on 

5/14/20at 11:34;  Start 5/14/20 at 11:00;  Stop 5/14/20 at 11:59;  Status DC


Potassium Chloride 90 meq/ Magnesium Sulfate 20 meq/ Multivitamins 10 

ml/Chromium/ Copper/Manganese/ Seleni/Zn 1 ml/ Insulin Human Regular 15 unit/ 

Total Parenteral Nutrition/Amino Acids/Dextrose/ Fat Emulsion Intravenous 1,920 

ml @  80 mls/hr TPN  CONT IV  Last administered on 5/14/20at 22:57;  Start 

5/14/20 at 22:00;  Stop 5/15/20 at 21:59;  Status DC


Potassium Chloride 90 meq/ Magnesium Sulfate 20 meq/ Multivitamins 10 

ml/Chromium/ Copper/Manganese/ Seleni/Zn 1 ml/ Insulin Human Regular 15 unit/ 

Total Parenteral Nutrition/Amino Acids/Dextrose/ Fat Emulsion Intravenous 1,920 

ml @  80 mls/hr TPN  CONT IV  Last administered on 5/15/20at 22:48;  Start 

5/15/20 at 22:00;  Stop 5/16/20 at 21:59;  Status DC


Potassium Chloride 90 meq/ Magnesium Sulfate 20 meq/ Multivitamins 10 

ml/Chromium/ Copper/Manganese/ Seleni/Zn 1 ml/ Insulin Human Regular 15 unit/ 

Total Parenteral Nutrition/Amino Acids/Dextrose/ Fat Emulsion Intravenous 1,890 

ml @  78.75 mls/ hr TPN  CONT IV  Last administered on 5/16/20at 22:15;  Start 

5/16/20 at 22:00;  Stop 5/17/20 at 21:59;  Status DC


Linezolid/Dextrose 300 ml @  300 mls/hr Q12HR IV  Last administered on 5/19/20at

21:08;  Start 5/17/20 at 09:00;  Stop 5/20/20 at 08:11;  Status DC


Daptomycin 450 mg/ Sodium Chloride 50 ml @  100 mls/hr Q24H IV  Last 

administered on 5/20/20at 09:25;  Start 5/17/20 at 09:00;  Stop 5/21/20 at 

08:30;  Status DC


Potassium Chloride 90 meq/ Magnesium Sulfate 20 meq/ Multivitamins 10 ml/Chromi

um/ Copper/Manganese/ Seleni/Zn 1 ml/ Insulin Human Regular 15 unit/ Total 

Parenteral Nutrition/Amino Acids/Dextrose/ Fat Emulsion Intravenous 1,890 ml @  

78.75 mls/ hr TPN  CONT IV  Last administered on 5/17/20at 21:34;  Start 5/17/20

at 22:00;  Stop 5/18/20 at 21:59;  Status DC


Lorazepam (Ativan Inj) 2 mg STK-MED ONCE .ROUTE ;  Start 5/17/20 at 14:58;  Stop

5/17/20 at 14:58;  Status DC


Metoprolol Tartrate (Lopressor Vial) 5 mg 1X  ONCE IVP  Last administered on 

5/17/20at 15:31;  Start 5/17/20 at 15:15;  Stop 5/17/20 at 15:16;  Status DC


Lorazepam (Ativan Inj) 2 mg 1X  ONCE IVP  Last administered on 5/17/20at 15:30; 

Start 5/17/20 at 15:15;  Stop 5/17/20 at 15:16;  Status DC


Enoxaparin Sodium (Lovenox 40mg Syringe) 40 mg Q24H SQ  Last administered on 

6/5/20at 17:44;  Start 5/17/20 at 17:00;  Stop 6/7/20 at 06:50;  Status DC


Lorazepam (Ativan Inj) 1 mg PRN Q4HRS  PRN IVP ANXIETY / AGITATION MILD-MOD Last

administered on 5/31/20at 15:55;  Start 5/17/20 at 19:15;  Stop 6/2/20 at 11:45;

 Status DC


Lorazepam (Ativan Inj) 2 mg PRN Q4HRS  PRN IVP ANXIETY / AGITATION SEVERE Last 

administered on 6/1/20at 07:55;  Start 5/17/20 at 19:15;  Stop 6/2/20 at 11:45; 

Status DC


Fentanyl Citrate (Fentanyl 2ml Vial) 50 mcg PRN Q4HRS  PRN IVP SEVERE PAIN Last 

administered on 6/13/20at 05:15;  Start 5/18/20 at 13:15;  Stop 6/14/20 at 

09:29;  Status DC


Fentanyl Citrate (Fentanyl 2ml Vial) 25 mcg PRN Q4HRS  PRN IVP MODERATE PAIN 

Last administered on 6/13/20at 00:27;  Start 5/18/20 at 13:15;  Stop 6/14/20 at 

09:30;  Status DC


Potassium Chloride 90 meq/ Magnesium Sulfate 20 meq/ Multivitamins 10 

ml/Chromium/ Copper/Manganese/ Seleni/Zn 1 ml/ Insulin Human Regular 15 unit/ 

Total Parenteral Nutrition/Amino Acids/Dextrose/ Fat Emulsion Intravenous 1,890 

ml @  78.75 mls/ hr TPN  CONT IV  Last administered on 5/18/20at 22:18;  Start 

5/18/20 at 22:00;  Stop 5/19/20 at 21:59;  Status DC


Furosemide (Lasix) 40 mg 1X  ONCE IVP  Last administered on 5/18/20at 21:51;  

Start 5/18/20 at 21:45;  Stop 5/18/20 at 21:48;  Status DC


Albumin Human 100 ml @  100 mls/hr 1X PRN  PRN IV SEE COMMENTS;  Start 5/19/20 

at 01:30


Furosemide (Lasix) 40 mg [id number] IVP  Last administered on 6/3/20at 08:04;  Start 

5/19/20 at 14:00;  Stop 6/3/20 at 13:07;  Status DC


Potassium Chloride 90 meq/ Magnesium Sulfate 20 meq/ Multivitamins 10 

ml/Chromium/ Copper/Manganese/ Seleni/Zn 1 ml/ Insulin Human Regular 15 unit/ 

Total Parenteral Nutrition/Amino Acids/Dextrose/ Fat Emulsion Intravenous 1,800 

ml @  75 mls/hr TPN  CONT IV  Last administered on 5/19/20at 22:31;  Start 

5/19/20 at 22:00;  Stop 5/20/20 at 21:59;  Status DC


Potassium Chloride 90 meq/ Magnesium Sulfate 20 meq/ Multivitamins 10 

ml/Chromium/ Copper/Manganese/ Seleni/Zn 1 ml/ Insulin Human Regular 15 unit/ 

Total Parenteral Nutrition/Amino Acids/Dextrose/ Fat Emulsion Intravenous 1,800 

ml @  75 mls/hr TPN  CONT IV  Last administered on 5/20/20at 22:28;  Start 

5/20/20 at 22:00;  Stop 5/21/20 at 21:59;  Status DC


Potassium Chloride 110 meq/ Magnesium Sulfate 20 meq/ Multivitamins 10 

ml/Chromium/ Copper/Manganese/ Seleni/Zn 1 ml/ Insulin Human Regular 15 unit/ 

Total Parenteral Nutrition/Amino Acids/Dextrose/ Fat Emulsion Intravenous 1,800 

ml @  75 mls/hr TPN  CONT IV  Last administered on 5/21/20at 22:01;  Start 

5/21/20 at 22:00;  Stop 5/22/20 at 21:59;  Status DC


Saliva Substitute (Biotene Moisturizing Mouth) 2 spray PRN Q15MIN  PRN PO DRY 

MOUTH;  Start 5/21/20 at 11:00


Potassium Chloride 110 meq/ Magnesium Sulfate 20 meq/ Multivitamins 10 

ml/Chromium/ Copper/Manganese/ Seleni/Zn 1 ml/ Insulin Human Regular 15 unit/ 

Total Parenteral Nutrition/Amino Acids/Dextrose/ Fat Emulsion Intravenous 1,800 

ml @  75 mls/hr TPN  CONT IV  Last administered on 5/22/20at 22:21;  Start 

5/22/20 at 22:00;  Stop 5/23/20 at 21:59;  Status DC


Potassium Chloride 110 meq/ Magnesium Sulfate 20 meq/ Multivitamins 10 

ml/Chromium/ Copper/Manganese/ Seleni/Zn 1 ml/ Insulin Human Regular 15 unit/ 

Total Parenteral Nutrition/Amino Acids/Dextrose/ Fat Emulsion Intravenous 1,800 

ml @  75 mls/hr TPN  CONT IV  Last administered on 5/23/20at 22:04;  Start 

5/23/20 at 22:00;  Stop 5/24/20 at 21:59;  Status DC


Potassium Chloride 110 meq/ Magnesium Sulfate 20 meq/ Multivitamins 10 

ml/Chromium/ Copper/Manganese/ Seleni/Zn 1 ml/ Insulin Human Regular 15 unit/ 

Total Parenteral Nutrition/Amino Acids/Dextrose/ Fat Emulsion Intravenous 1,800 

ml @  75 mls/hr TPN  CONT IV  Last administered on 5/24/20at 22:48;  Start 

5/24/20 at 22:00;  Stop 5/25/20 at 21:59;  Status DC


Potassium Chloride 70 meq/ Magnesium Sulfate 20 meq/ Multivitamins 10 

ml/Chromium/ Copper/Manganese/ Seleni/Zn 1 ml/ Insulin Human Regular 15 unit/ 

Total Parenteral Nutrition/Amino Acids/Dextrose/ Fat Emulsion Intravenous 1,800 

ml @  75 mls/hr TPN  CONT IV  Last administered on 5/25/20at 21:39;  Start 

5/25/20 at 22:00;  Stop 5/26/20 at 21:59;  Status DC


Meropenem 500 mg/ Sodium Chloride 50 ml @  100 mls/hr Q6HRS IV  Last 

administered on 5/27/20at 06:02;  Start 5/25/20 at 18:00;  Stop 5/27/20 at 

09:59;  Status DC


Barium Sulfate (Varibar Thin Liquid Apple) 148 gm 1X  ONCE PO ;  Start 5/26/20 

at 11:45;  Stop 5/26/20 at 11:49;  Status DC


Potassium Chloride 70 meq/ Magnesium Sulfate 20 meq/ Multivitamins 10 

ml/Chromium/ Copper/Manganese/ Seleni/Zn 1 ml/ Insulin Human Regular 15 unit/ 

Total Parenteral Nutrition/Amino Acids/Dextrose/ Fat Emulsion Intravenous 1,800 

ml @  75 mls/hr TPN  CONT IV  Last administered on 5/26/20at 22:27;  Start 

5/26/20 at 22:00;  Stop 5/27/20 at 21:59;  Status DC


Piperacillin Sod/ Tazobactam Sod 3.375 gm/Sodium Chloride 50 ml @  100 mls/hr 

Q6HRS IV  Last administered on 6/4/20at 06:10;  Start 5/27/20 at 12:00;  Stop 

6/4/20 at 07:26;  Status DC


Potassium Chloride 70 meq/ Magnesium Sulfate 20 meq/ Multivitamins 10 

ml/Chromium/ Copper/Manganese/ Seleni/Zn 1 ml/ Insulin Human Regular 15 unit/ 

Total Parenteral Nutrition/Amino Acids/Dextrose/ Fat Emulsion Intravenous 1,800 

ml @  75 mls/hr TPN  CONT IV  Last administered on 5/27/20at 22:03;  Start 

5/27/20 at 22:00;  Stop 5/28/20 at 21:59;  Status DC


Potassium Chloride 70 meq/ Magnesium Sulfate 20 meq/ Multivitamins 10 

ml/Chromium/ Copper/Manganese/ Seleni/Zn 1 ml/ Insulin Human Regular 15 unit/ 

Total Parenteral Nutrition/Amino Acids/Dextrose/ Fat Emulsion Intravenous 1,800 

ml @  75 mls/hr TPN  CONT IV  Last administered on 5/28/20at 22:33;  Start 

5/28/20 at 22:00;  Stop 5/29/20 at 21:59;  Status DC


Potassium Chloride 70 meq/ Magnesium Sulfate 20 meq/ Multivitamins 10 

ml/Chromium/ Copper/Manganese/ Seleni/Zn 1 ml/ Insulin Human Regular 15 unit/ 

Total Parenteral Nutrition/Amino Acids/Dextrose/ Fat Emulsion Intravenous 1,800 

ml @  75 mls/hr TPN  CONT IV  Last administered on 5/29/20at 23:13;  Start 

5/29/20 at 22:00;  Stop 5/30/20 at 21:59;  Status DC


Potassium Chloride 80 meq/ Magnesium Sulfate 20 meq/ Multivitamins 10 

ml/Chromium/ Copper/Manganese/ Seleni/Zn 1 ml/ Insulin Human Regular 15 unit/ 

Total Parenteral Nutrition/Amino Acids/Dextrose/ Fat Emulsion Intravenous 1,800 

ml @  75 mls/hr TPN  CONT IV  Last administered on 5/30/20at 22:30;  Start 

5/30/20 at 22:00;  Stop 5/31/20 at 21:59;  Status DC


Potassium Chloride 80 meq/ Magnesium Sulfate 20 meq/ Multivitamins 10 

ml/Chromium/ Copper/Manganese/ Seleni/Zn 1 ml/ Insulin Human Regular 15 unit/ 

Total Parenteral Nutrition/Amino Acids/Dextrose/ Fat Emulsion Intravenous 1,800 

ml @  75 mls/hr TPN  CONT IV  Last administered on 5/31/20at 21:54;  Start 

5/31/20 at 22:00;  Stop 6/1/20 at 21:59;  Status DC


Potassium Chloride/Water 100 ml @  100 mls/hr 1X  ONCE IV  Last administered on 

6/1/20at 10:15;  Start 6/1/20 at 10:00;  Stop 6/1/20 at 10:59;  Status DC


Potassium Chloride 90 meq/ Magnesium Sulfate 20 meq/ Multivitamins 10 m

l/Chromium/ Copper/Manganese/ Seleni/Zn 1 ml/ Insulin Human Regular 20 unit/ 

Total Parenteral Nutrition/Amino Acids/Dextrose/ Fat Emulsion Intravenous 1,800 

ml @  75 mls/hr TPN  CONT IV  Last administered on 6/1/20at 22:28;  Start 6/1/20

at 22:00;  Stop 6/2/20 at 21:59;  Status DC


Potassium Chloride 90 meq/ Magnesium Sulfate 20 meq/ Multivitamins 10 

ml/Chromium/ Copper/Manganese/ Seleni/Zn 1 ml/ Insulin Human Regular 20 unit/ 

Total Parenteral Nutrition/Amino Acids/Dextrose/ Fat Emulsion Intravenous 1,800 

ml @  75 mls/hr TPN  CONT IV  Last administered on 6/2/20at 22:08;  Start 6/2/20

at 22:00;  Stop 6/3/20 at 21:59;  Status DC


Lorazepam (Ativan Inj) 0.25 mg PRN Q4HRS  PRN IVP ANXIETY / AGITATION Last 

administered on 7/12/20at 00:27;  Start 6/3/20 at 07:30


Potassium Chloride 90 meq/ Magnesium Sulfate 20 meq/ Multivitamins 10 

ml/Chromium/ Copper/Manganese/ Seleni/Zn 1 ml/ Insulin Human Regular 20 unit/ 

Total Parenteral Nutrition/Amino Acids/Dextrose/ Fat Emulsion Intravenous 1,800 

ml @  75 mls/hr TPN  CONT IV  Last administered on 6/3/20at 23:13;  Start 6/3/20

at 22:00;  Stop 6/4/20 at 21:59;  Status DC


Furosemide (Lasix) 40 mg DAILY IVP  Last administered on 6/5/20at 11:14;  Start 

6/3/20 at 13:30;  Stop 6/7/20 at 09:12;  Status DC


Fluoxetine HCl (PROzac) 20 mg QHS PEG  Last administered on 7/17/20at 21:20;  

Start 6/4/20 at 21:00


Fentanyl (Duragesic 50mcg/ Hr Patch) 1 patch Q72H TD  Last administered on 

6/4/20at 21:22;  Start 6/4/20 at 21:00;  Stop 6/13/20 at 12:00;  Status DC


Potassium Chloride 40 meq/ Potassium Acetate 60 meq/Magnesium Sulfate 10 meq/ 

Multivitamins 10 ml/Chromium/ Copper/Manganese/ Seleni/Zn 1 ml/ Insulin Human 

Regular 20 unit/ Total Parenteral Nutrition/Amino Acids/Dextrose/ Fat Emulsion 

Intravenous 1,800 ml @  75 mls/hr TPN  CONT IV  Last administered on 6/5/20at 

00:03;  Start 6/4/20 at 22:00;  Stop 6/5/20 at 21:59;  Status DC


Potassium Acetate 80 meq/Magnesium Sulfate 5 meq/ Multivitamins 10 ml/Chromium/ 

Copper/Manganese/ Seleni/Zn 1 ml/ Insulin Human Regular 20 unit/ Total 

Parenteral Nutrition/Amino Acids/Dextrose/ Fat Emulsion Intravenous 1,920 ml @  

80 mls/hr TPN  CONT IV  Last administered on 6/5/20at 21:59;  Start 6/5/20 at 

22:00;  Stop 6/6/20 at 21:59;  Status DC


Potassium Acetate 60 meq/Magnesium Sulfate 5 meq/ Multivitamins 10 ml/Chromium/ 

Copper/Manganese/ Seleni/Zn 1 ml/ Insulin Human Regular 30 unit/ Total 

Parenteral Nutrition/Amino Acids/Dextrose/ Fat Emulsion Intravenous 1,920 ml @  

80 mls/hr TPN  CONT IV  Last administered on 6/6/20at 21:54;  Start 6/6/20 at 

22:00;  Stop 6/7/20 at 21:59;  Status DC


Norepinephrine Bitartrate 8 mg/ Dextrose 258 ml @  13.332 mls/ hr CONT  PRN IV 

PER PROTOCOL Last administered on 7/2/20at 09:09;  Start 6/7/20 at 06:30


Albumin Human 500 ml @  125 mls/hr 1X  ONCE IV  Last administered on 6/7/20at 

08:10;  Start 6/7/20 at 08:15;  Stop 6/7/20 at 12:14;  Status DC


Potassium Acetate 40 meq/Magnesium Sulfate 5 meq/ Multivitamins 10 ml/Chromium/ 

Copper/Manganese/ Seleni/Zn 1 ml/ Insulin Human Regular 30 unit/ Total 

Parenteral Nutrition/Amino Acids/Dextrose/ Fat Emulsion Intravenous 1,920 ml @  

80 mls/hr TPN  CONT IV  Last administered on 6/7/20at 22:23;  Start 6/7/20 at 

22:00;  Stop 6/8/20 at 21:59;  Status DC


Meropenem 1 gm/ Sodium Chloride 100 ml @  200 mls/hr Q8HRS IV ;  Start 6/7/20 at

14:00;  Status Cancel


Meropenem 1 gm/ Sodium Chloride 100 ml @  200 mls/hr Q8HRS IV  Last administered

on 6/7/20at 11:04;  Start 6/7/20 at 10:00;  Stop 6/7/20 at 13:00;  Status DC


Meropenem 1 gm/ Sodium Chloride 100 ml @  200 mls/hr Q12HR IV  Last administered

on 6/25/20at 08:27;  Start 6/7/20 at 21:00;  Stop 6/25/20 at 08:56;  Status DC


Sodium Chloride 1,000 ml @  1,000 mls/hr 1X  ONCE IV  Last administered on 

6/7/20at 11:06;  Start 6/7/20 at 10:45;  Stop 6/7/20 at 11:44;  Status DC


Micafungin Sodium 100 mg/Dextrose 100 ml @  100 mls/hr Q24H IV  Last 

administered on 6/24/20at 12:34;  Start 6/7/20 at 11:00;  Stop 6/25/20 at 08:56;

 Status DC


Daptomycin 410 mg/ Sodium Chloride 50 ml @  100 mls/hr Q24H IV  Last 

administered on 6/9/20at 13:33;  Start 6/7/20 at 14:00;  Stop 6/10/20 at 08:30; 

Status DC


Midazolam HCl (Versed) 2 mg STK-MED ONCE .ROUTE ;  Start 6/7/20 at 14:47;  Stop 

6/7/20 at 14:48;  Status DC


Fentanyl Citrate (Fentanyl 2ml Vial) 100 mcg STK-MED ONCE .ROUTE ;  Start 6/7/20

at 14:47;  Stop 6/7/20 at 14:48;  Status DC


Flumazenil (Romazicon) 0.5 mg STK-MED ONCE IV ;  Start 6/7/20 at 14:48;  Stop 

6/7/20 at 14:48;  Status DC


Naloxone HCl (Narcan) 0.4 mg STK-MED ONCE .ROUTE ;  Start 6/7/20 at 14:48;  Stop

6/7/20 at 14:48;  Status DC


Lidocaine HCl (Lidocaine 1% 20ml Vial) 20 ml STK-MED ONCE .ROUTE ;  Start 6/7/20

at 14:48;  Stop 6/7/20 at 14:48;  Status DC


Midazolam HCl (Versed) 2 mg 1X  ONCE IV  Last administered on 6/7/20at 15:28;  

Start 6/7/20 at 15:00;  Stop 6/7/20 at 15:01;  Status DC


Fentanyl Citrate (Fentanyl 2ml Vial) 100 mcg 1X  ONCE IV  Last administered on 

6/7/20at 15:28;  Start 6/7/20 at 15:00;  Stop 6/7/20 at 15:01;  Status DC


Lidocaine HCl (Lidocaine 1% 20ml Vial) 20 ml 1X  ONCE INJ  Last administered on 

6/7/20at 15:30;  Start 6/7/20 at 15:00;  Stop 6/7/20 at 15:01;  Status DC


Sodium Chloride 1,000 ml @  100 mls/hr Q10H IV  Last administered on 6/16/20at 

07:30;  Start 6/7/20 at 20:00;  Stop 6/16/20 at 11:26;  Status DC


Sodium Bicarbonate (Sodium Bicarb Adult 8.4% Syr) 50 meq 1X  ONCE IV  Last 

administered on 6/7/20at 21:47;  Start 6/7/20 at 22:00;  Stop 6/7/20 at 22:01;  

Status DC


Potassium Acetate 40 meq/Magnesium Sulfate 5 meq/ Multivitamins 10 ml/Chromium/ 

Copper/Manganese/ Seleni/Zn 1 ml/ Insulin Human Regular 30 unit/ Total 

Parenteral Nutrition/Amino Acids/Dextrose/ Fat Emulsion Intravenous 1,920 ml @  

80 mls/hr TPN  CONT IV  Last administered on 6/8/20at 22:28;  Start 6/8/20 at 

22:00;  Stop 6/9/20 at 21:59;  Status DC


Sodium Chloride 500 ml @  500 mls/hr 1X  ONCE IV  Last administered on 6/9/20at 

06:39;  Start 6/9/20 at 06:45;  Stop 6/9/20 at 07:44;  Status DC


Potassium Acetate 40 meq/Magnesium Sulfate 5 meq/ Multivitamins 10 ml/Chromium/ 

Copper/Manganese/ Seleni/Zn 1 ml/ Insulin Human Regular 30 unit/ Total 

Parenteral Nutrition/Amino Acids/Dextrose/ Fat Emulsion Intravenous 1,920 ml @  

80 mls/hr TPN  CONT IV  Last administered on 6/9/20at 22:03;  Start 6/9/20 at 

22:00;  Stop 6/10/20 at 21:59;  Status DC


Metoprolol Tartrate (Lopressor Vial) 5 mg PRN Q6HRS  PRN IVP HYPERTENSION Last 

administered on 7/12/20at 18:01;  Start 6/10/20 at 09:00


Potassium Acetate 40 meq/Magnesium Sulfate 5 meq/ Multivitamins 10 ml/Chromium/ 

Copper/Manganese/ Seleni/Zn 1 ml/ Insulin Human Regular 30 unit/ Total 

Parenteral Nutrition/Amino Acids/Dextrose/ Fat Emulsion Intravenous 1,920 ml @  

80 mls/hr TPN  CONT IV  Last administered on 6/10/20at 21:26;  Start 6/10/20 at 

22:00;  Stop 6/11/20 at 21:59;  Status DC


Potassium Acetate 40 meq/Magnesium Sulfate 5 meq/ Multivitamins 10 ml/Chromium/ 

Copper/Manganese/ Seleni/Zn 1 ml/ Insulin Human Regular 30 unit/ Total 

Parenteral Nutrition/Amino Acids/Dextrose/ Fat Emulsion Intravenous 1,920 ml @  

80 mls/hr TPN  CONT IV  Last administered on 6/11/20at 23:23;  Start 6/11/20 at 

22:00;  Stop 6/12/20 at 21:59;  Status DC


Potassium Acetate 40 meq/Magnesium Sulfate 5 meq/ Multivitamins 10 ml/Chromium/ 

Copper/Manganese/ Seleni/Zn 1 ml/ Insulin Human Regular 30 unit/ Total 

Parenteral Nutrition/Amino Acids/Dextrose/ Fat Emulsion Intravenous 1,920 ml @  

80 mls/hr TPN  CONT IV  Last administered on 6/12/20at 21:35;  Start 6/12/20 at 

22:00;  Stop 6/13/20 at 21:59;  Status DC


Furosemide (Lasix) 20 mg 1X  ONCE IVP  Last administered on 6/13/20at 06:26;  

Start 6/13/20 at 06:15;  Stop 6/13/20 at 06:16;  Status DC


Methylprednisolone Sodium Succinate (SOLU-Medrol 125MG VIAL) 125 mg 1X  ONCE IV 

Last administered on 6/13/20at 06:26;  Start 6/13/20 at 06:15;  Stop 6/13/20 at 

06:16;  Status DC


Albuterol/ Ipratropium (Duoneb) 3 ml Q4HRS NEB  Last administered on 7/18/20at 

08:53;  Start 6/13/20 at 08:00


Fentanyl Citrate 30 ml @ 0 mls/hr CONT  PRN IV SEE PROTOCOL Last administered on

7/4/20at 08:03;  Start 6/13/20 at 06:00;  Stop 7/4/20 at 12:42;  Status DC


Propofol 100 ml @ 0 mls/hr CONT  PRN IV SEE PROTOCOL Last administered on 

6/20/20at 23:50;  Start 6/13/20 at 06:00


Fentanyl Citrate (Fentanyl 2ml Vial) 25 mcg PRN Q1HR  PRN IV SEE COMMENTS Last 

administered on 7/17/20at 18:08;  Start 6/13/20 at 06:00


Fentanyl Citrate (Fentanyl 2ml Vial) 50 mcg PRN Q1HR  PRN IV SEE COMMENTS Last 

administered on 7/12/20at 18:02;  Start 6/13/20 at 06:00


Chlorhexidine Gluconate (Peridex) 15 ml BID MM ;  Start 6/13/20 at 09:00;  Stop 

6/13/20 at 07:58;  Status DC


Potassium Acetate 40 meq/Magnesium Sulfate 5 meq/ Multivitamins 10 ml/Chromium/ 

Copper/Manganese/ Seleni/Zn 1 ml/ Insulin Human Regular 30 unit/ Total 

Parenteral Nutrition/Amino Acids/Dextrose/ Fat Emulsion Intravenous 1,920 ml @  

80 mls/hr TPN  CONT IV  Last administered on 6/13/20at 21:19;  Start 6/13/20 at 

22:00;  Stop 6/14/20 at 21:59;  Status DC


Acetylcysteine (Mucomyst 20% Resp Treatment) 600 mg BID NEB  Last administered 

on 6/19/20at 09:33;  Start 6/13/20 at 21:00;  Stop 6/19/20 at 10:39;  Status DC


Magnesium Sulfate 100 ml @  25 mls/hr 1X  ONCE IV  Last administered on 

6/13/20at 15:48;  Start 6/13/20 at 15:45;  Stop 6/13/20 at 19:44;  Status DC


Potassium Acetate 40 meq/Magnesium Sulfate 5 meq/ Multivitamins 10 ml/Chromium/ 

Copper/Manganese/ Seleni/Zn 1 ml/ Insulin Human Regular 30 unit/ Total 

Parenteral Nutrition/Amino Acids/Dextrose/ Fat Emulsion Intravenous 1,920 ml @  

80 mls/hr TPN  CONT IV  Last administered on 6/14/20at 21:35;  Start 6/14/20 at 

22:00;  Stop 6/15/20 at 21:59;  Status DC


Potassium Chloride/Water 100 ml @  100 mls/hr Q1H IV  Last administered on 

6/15/20at 08:31;  Start 6/15/20 at 07:00;  Stop 6/15/20 at 08:59;  Status DC


Potassium Acetate 40 meq/Magnesium Sulfate 5 meq/ Multivitamins 10 ml/Chromium/ 

Copper/Manganese/ Seleni/Zn 1 ml/ Insulin Human Regular 30 unit/ Total 

Parenteral Nutrition/Amino Acids/Dextrose/ Fat Emulsion Intravenous 1,920 ml @  

80 mls/hr TPN  CONT IV  Last administered on 6/15/20at 21:54;  Start 6/15/20 at 

22:00;  Stop 6/16/20 at 19:34;  Status DC


Lidocaine HCl (Buffered Lidocaine 1%) 3 ml STK-MED ONCE .ROUTE ;  Start 6/15/20 

at 12:14;  Stop 6/15/20 at 12:14;  Status DC


Lidocaine HCl (Buffered Lidocaine 1%) 3 ml 1X  ONCE IJ  Last administered on 

6/15/20at 13:11;  Start 6/15/20 at 13:00;  Stop 6/15/20 at 13:01;  Status DC


Magnesium Sulfate 50 ml @ 25 mls/hr 1X  ONCE IV ;  Start 6/16/20 at 08:15;  Stop

6/16/20 at 10:14;  Status DC


Potassium Acetate 40 meq/Magnesium Sulfate 10 meq/ Multivitamins 10 ml/Chromium/

Copper/Manganese/ Seleni/Zn 1 ml/ Insulin Human Regular 20 unit/ Total Paren

teral Nutrition/Amino Acids/Dextrose/ Fat Emulsion Intravenous 1,920 ml @  80 

mls/hr TPN  CONT IV  Last administered on 6/16/20at 21:32;  Start 6/16/20 at 

22:00;  Stop 6/17/20 at 21:59;  Status DC


Potassium Chloride/Water 100 ml @  100 mls/hr Q1H IV  Last administered on 

6/17/20at 09:12;  Start 6/17/20 at 08:00;  Stop 6/17/20 at 09:59;  Status DC


Alteplase, Recombinant (Cathflo For Central Catheter Clearance) 4 mg 1X  ONCE 

INT CAT ;  Start 6/17/20 at 09:15;  Stop 6/17/20 at 09:16;  Status UNV


Alteplase, Recombinant (Cathflo For Central Catheter Clearance) 4 mg 1X  ONCE 

INT CAT ;  Start 6/17/20 at 09:15;  Stop 6/17/20 at 09:16;  Status UNV


Alteplase, Recombinant (Cathflo For Central Catheter Clearance) 4 mg 1X  ONCE 

INT CAT ;  Start 6/17/20 at 09:15;  Stop 6/17/20 at 09:16;  Status UNV


Alteplase, Recombinant 4 mg/ Sodium Chloride 20 ml @ 20 mls/hr 1X  ONCE IV  Last

administered on 6/17/20at 10:10;  Start 6/17/20 at 10:00;  Stop 6/17/20 at 

10:59;  Status DC


Alteplase, Recombinant 4 mg/ Sodium Chloride 20 ml @ 20 mls/hr 1X  ONCE IV  Last

administered on 6/17/20at 10:09;  Start 6/17/20 at 10:00;  Stop 6/17/20 at 

10:59;  Status DC


Alteplase, Recombinant 4 mg/ Sodium Chloride 20 ml @ 20 mls/hr 1X  ONCE IV  Last

administered on 6/17/20at 10:09;  Start 6/17/20 at 10:00;  Stop 6/17/20 at 

10:59;  Status DC


Potassium Acetate 60 meq/Magnesium Sulfate 10 meq/ Multivitamins 10 ml/Chromium/

Copper/Manganese/ Seleni/Zn 1 ml/ Insulin Human Regular 20 unit/ Total 

Parenteral Nutrition/Amino Acids/Dextrose/ Fat Emulsion Intravenous 1,920 ml @  

80 mls/hr TPN  CONT IV  Last administered on 6/17/20at 21:55;  Start 6/17/20 at 

22:00;  Stop 6/18/20 at 21:59;  Status DC


Albumin Human 500 ml @  125 mls/hr 1X  ONCE IV  Last administered on 6/18/20at 

12:01;  Start 6/18/20 at 11:15;  Stop 6/18/20 at 15:14;  Status DC


Sodium Chloride 500 ml @  500 mls/hr 1X  ONCE IV  Last administered on 6/18/20at

13:50;  Start 6/18/20 at 11:15;  Stop 6/18/20 at 12:14;  Status DC


Potassium Acetate 60 meq/Magnesium Sulfate 14 meq/ Multivitamins 10 ml/Chromium/

Copper/Manganese/ Seleni/Zn 1 ml/ Insulin Human Regular 20 unit/ Total 

Parenteral Nutrition/Amino Acids/Dextrose/ Fat Emulsion Intravenous 1,920 ml @  

80 mls/hr TPN  CONT IV  Last administered on 6/18/20at 22:26;  Start 6/18/20 at 

22:00;  Stop 6/19/20 at 21:59;  Status DC


Ciprofloxacin/ Dextrose 200 ml @  200 mls/hr Q12HR IV  Last administered on 

6/25/20at 08:27;  Start 6/18/20 at 21:00;  Stop 6/25/20 at 08:56;  Status DC


Albumin Human 250 ml @  62.5 mls/hr 1X  ONCE IV  Last administered on 6/19/20at 

11:09;  Start 6/19/20 at 11:00;  Stop 6/19/20 at 14:59;  Status DC


Furosemide (Lasix) 20 mg 1X  ONCE IVP  Last administered on 6/19/20at 14:52;  

Start 6/19/20 at 10:45;  Stop 6/19/20 at 10:49;  Status DC


Potassium Acetate 60 meq/Magnesium Sulfate 14 meq/ Multivitamins 10 ml/Chromium/

Copper/Manganese/ Seleni/Zn 1 ml/ Insulin Human Regular 15 unit/ Total 

Parenteral Nutrition/Amino Acids/Dextrose/ Fat Emulsion Intravenous 1,920 ml @  

80 mls/hr TPN  CONT IV  Last administered on 6/19/20at 22:08;  Start 6/19/20 at 

22:00;  Stop 6/20/20 at 21:59;  Status DC


Potassium Acetate 60 meq/Magnesium Sulfate 14 meq/ Multivitamins 10 ml/Chromium/

Copper/Manganese/ Seleni/Zn 1 ml/ Insulin Human Regular 15 unit/ Total 

Parenteral Nutrition/Amino Acids/Dextrose/ Fat Emulsion Intravenous 1,920 ml @  

80 mls/hr TPN  CONT IV  Last administered on 6/20/20at 22:12;  Start 6/20/20 at 

22:00;  Stop 6/21/20 at 21:59;  Status DC


Potassium Acetate 60 meq/Magnesium Sulfate 14 meq/ Multivitamins 10 ml/Chromium/

Copper/Manganese/ Seleni/Zn 1 ml/ Insulin Human Regular 15 unit/ Total 

Parenteral Nutrition/Amino Acids/Dextrose/ Fat Emulsion Intravenous 1,920 ml @  

80 mls/hr TPN  CONT IV  Last administered on 6/21/20at 22:22;  Start 6/21/20 at 

22:00;  Stop 6/22/20 at 21:59;  Status DC


Furosemide (Lasix) 20 mg 1X  ONCE IVP  Last administered on 6/22/20at 11:07;  

Start 6/22/20 at 10:30;  Stop 6/22/20 at 10:34;  Status DC


Potassium Acetate 60 meq/Magnesium Sulfate 14 meq/ Multivitamins 10 ml/Chromium/

Copper/Manganese/ Seleni/Zn 1 ml/ Insulin Human Regular 15 unit/ Sodium Chloride

20 meq/Total Parenteral Nutrition/Amino Acids/Dextrose/ Fat Emulsion Intravenous

1,920 ml @  80 mls/hr TPN  CONT IV  Last administered on 6/22/20at 21:54;  Start

6/22/20 at 22:00;  Stop 6/23/20 at 21:59;  Status DC


Potassium Acetate 30 meq/Magnesium Sulfate 14 meq/ Multivitamins 10 ml/Chromium/

Copper/Manganese/ Seleni/Zn 1 ml/ Insulin Human Regular 15 unit/ Sodium Chloride

20 meq/Potassium Chloride 30 meq/ Total Parenteral Nutrition/Amino 

Acids/Dextrose/ Fat Emulsion Intravenous 1,920 ml @  80 mls/hr TPN  CONT IV  

Last administered on 6/23/20at 21:46;  Start 6/23/20 at 22:00;  Stop 6/24/20 at 

21:59;  Status DC


Sodium Chloride 80 meq/Potassium Chloride 30 meq/ Potassium Acetate 30 

meq/Magnesium Sulfate 14 meq/ Multivitamins 10 ml/Chromium/ Copper/Manganese/ 

Seleni/Zn 1 ml/ Insulin Human Regular 15 unit/ Total Parenteral Nutrition/Amino 

Acids/Dextrose/ Fat Emulsion Intravenous 1,920 ml @  80 mls/hr TPN  CONT IV  

Last administered on 6/24/20at 22:33;  Start 6/24/20 at 22:00;  Stop 6/25/20 at 

21:59;  Status DC


Furosemide (Lasix) 40 mg 1X  ONCE IVP  Last administered on 6/24/20at 16:27;  

Start 6/24/20 at 15:30;  Stop 6/24/20 at 15:33;  Status DC


Albumin Human 250 ml @  62.5 mls/hr 1X  ONCE IV  Last administered on 6/24/20at 

16:27;  Start 6/24/20 at 15:30;  Stop 6/24/20 at 19:29;  Status DC


Sodium Chloride 80 meq/Potassium Chloride 30 meq/ Potassium Acetate 30 meq/Magn

esium Sulfate 14 meq/ Multivitamins 10 ml/Chromium/ Copper/Manganese/ Seleni/Zn 

1 ml/ Insulin Human Regular 15 unit/ Total Parenteral Nutrition/Amino 

Acids/Dextrose/ Fat Emulsion Intravenous 1,920 ml @  80 mls/hr TPN  CONT IV  

Last administered on 6/25/20at 22:25;  Start 6/25/20 at 22:00;  Stop 6/26/20 at 

21:59;  Status DC


Sodium Chloride 80 meq/Potassium Chloride 30 meq/ Potassium Acetate 30 

meq/Magnesium Sulfate 14 meq/ Multivitamins 10 ml/Chromium/ Copper/Manganese/ 

Seleni/Zn 1 ml/ Insulin Human Regular 15 unit/ Total Parenteral Nutrition/Amino 

Acids/Dextrose/ Fat Emulsion Intravenous 1,920 ml @  80 mls/hr TPN  CONT IV  

Last administered on 6/26/20at 21:32;  Start 6/26/20 at 22:00;  Stop 6/27/20 at 

21:59;  Status DC


Sodium Chloride 80 meq/Potassium Chloride 30 meq/ Potassium Acetate 30 

meq/Magnesium Sulfate 14 meq/ Multivitamins 10 ml/Chromium/ Copper/Manganese/ 

Seleni/Zn 1 ml/ Insulin Human Regular 15 unit/ Total Parenteral Nutrition/Amino 

Acids/Dextrose/ Fat Emulsion Intravenous 1,920 ml @  80 mls/hr TPN  CONT IV  

Last administered on 6/27/20at 21:53;  Start 6/27/20 at 22:00;  Stop 6/28/20 at 

21:59;  Status DC


Acetylcysteine (Mucomyst 20% Resp Treatment) 600 mg RTBID NEB  Last administered

on 7/18/20at 08:00;  Start 6/27/20 at 12:00


Sodium Chloride 80 meq/Potassium Chloride 30 meq/ Potassium Acetate 30 

meq/Magnesium Sulfate 14 meq/ Multivitamins 10 ml/Chromium/ Copper/Manganese/ S

haley/Zn 1 ml/ Insulin Human Regular 15 unit/ Total Parenteral Nutrition/Amino 

Acids/Dextrose/ Fat Emulsion Intravenous 1,920 ml @  80 mls/hr TPN  CONT IV  

Last administered on 6/28/20at 22:06;  Start 6/28/20 at 22:00;  Stop 6/29/20 at 

21:59;  Status DC


Meropenem 500 mg/ Sodium Chloride 50 ml @  100 mls/hr Q6HRS IV  Last 

administered on 7/18/20at 05:26;  Start 6/28/20 at 18:00


Daptomycin 500 mg/ Sodium Chloride 50 ml @  100 mls/hr Q24H IV  Last 

administered on 7/6/20at 21:47;  Start 6/28/20 at 19:00;  Stop 7/7/20 at 08:13; 

Status DC


Sodium Chloride 80 meq/Potassium Chloride 30 meq/ Potassium Acetate 30 

meq/Magnesium Sulfate 14 meq/ Multivitamins 10 ml/Chromium/ Copper/Manganese/ 

Seleni/Zn 1 ml/ Insulin Human Regular 15 unit/ Total Parenteral Nutrition/Amino 

Acids/Dextrose/ Fat Emulsion Intravenous 1,920 ml @  80 mls/hr TPN  CONT IV  

Last administered on 6/29/20at 22:09;  Start 6/29/20 at 22:00;  Stop 6/30/20 at 

21:59;  Status DC


Heparin Sodium (Porcine) 1000 unit/Sodium Chloride 1,001 ml @  1,001 mls/hr 1X  

ONCE IRR ;  Start 6/30/20 at 06:00;  Stop 6/30/20 at 06:59;  Status DC


Propofol (Diprivan) 200 mg STK-MED ONCE IV ;  Start 6/30/20 at 07:44;  Stop 

6/30/20 at 07:44;  Status DC


Lidocaine HCl (Lidocaine Pf 2% Vial) 5 ml STK-MED ONCE .ROUTE ;  Start 6/30/20 a

t 07:44;  Stop 6/30/20 at 07:44;  Status DC


Fentanyl Citrate (Fentanyl 2ml Vial) 100 mcg STK-MED ONCE .ROUTE ;  Start 

6/30/20 at 07:44;  Stop 6/30/20 at 07:44;  Status DC


Rocuronium Bromide (Zemuron) 100 mg STK-MED ONCE .ROUTE ;  Start 6/30/20 at 

07:44;  Stop 6/30/20 at 07:44;  Status DC


Micafungin Sodium 100 mg/Dextrose 100 ml @  100 mls/hr Q24H IV  Last 

administered on 7/18/20at 09:37;  Start 6/30/20 at 08:30


Bupivacaine HCl/ Epinephrine Bitart (Sensorcain-Epi 0.5%-1:[id number] Mpf) 30 ml 

STK-MED ONCE .ROUTE ;  Start 6/30/20 at 08:34;  Stop 6/30/20 at 08:35;  Status 

DC


Iohexol (Omnipaque 300 Mg/ml) 50 ml STK-MED ONCE .ROUTE  Last administered on 

6/30/20at 13:30;  Start 6/30/20 at 08:35;  Stop 6/30/20 at 08:35;  Status DC


Sodium Chloride 80 meq/Potassium Chloride 30 meq/ Potassium Acetate 30 

meq/Magnesium Sulfate 14 meq/ Multivitamins 10 ml/Chromium/ Copper/Manganese/ 

Seleni/Zn 1 ml/ Insulin Human Regular 15 unit/ Total Parenteral Nutrition/Amino 

Acids/Dextrose/ Fat Emulsion Intravenous 1,920 ml @  80 mls/hr TPN  CONT IV  

Last administered on 7/1/20at 01:22;  Start 6/30/20 at 22:00;  Stop 7/1/20 at 

21:59;  Status DC


Phenylephrine HCl (Ken-Synephrine Inj) 10 mg STK-MED ONCE .ROUTE ;  Start 

6/30/20 at 10:15;  Stop 6/30/20 at 10:15;  Status DC


Desflurane (Suprane) 90 ml STK-MED ONCE IH ;  Start 6/30/20 at 10:18;  Stop 

6/30/20 at 10:19;  Status DC


Albumin Human 500 ml @  As Directed STK-MED ONCE IV ;  Start 6/30/20 at 11:06;  

Stop 6/30/20 at 11:06;  Status DC


Vasopressin (Vasostrict) 20 unit STK-MED ONCE .ROUTE ;  Start 6/30/20 at 12:23; 

Stop 6/30/20 at 12:23;  Status DC


Phenylephrine HCl (Ken-Synephrine Inj) 10 mg STK-MED ONCE .ROUTE ;  Start 

6/30/20 at 13:33;  Stop 6/30/20 at 13:33;  Status DC


Phenylephrine HCl (Ken-Synephrine Inj) 10 mg STK-MED ONCE .ROUTE ;  Start 

6/30/20 at 13:33;  Stop 6/30/20 at 13:33;  Status DC


Ondansetron HCl (Zofran) 4 mg STK-MED ONCE .ROUTE ;  Start 6/30/20 at 13:33;  

Stop 6/30/20 at 13:33;  Status DC


Enoxaparin Sodium (Lovenox 40mg Syringe) 40 mg Q24H SQ  Last administered on 

7/18/20at 09:35;  Start 7/1/20 at 08:00


Sodium Chloride (Normal Saline Flush) 3 ml QSHIFT  PRN IV AFTER MEDS AND BLOOD 

DRAWS;  Start 6/30/20 at 14:45


Naloxone HCl (Narcan) 0.4 mg PRN Q2MIN  PRN IV SEE INSTRUCTIONS;  Start 6/30/20 

at 14:45


Sodium Chloride 1,000 ml @  25 mls/hr Q24H IV  Last administered on 7/16/20at 

14:33;  Start 6/30/20 at 14:33


Morphine Sulfate (Morphine Sulfate) 1 mg PRN Q1HR  PRN IV PAIN;  Start 6/30/20 

at 14:45


Midazolam HCl 100 mg/Sodium Chloride 100 ml @ 1 mls/hr CONT  PRN IV SEE I/O 

RECORD Last administered on 7/3/20at 18:48;  Start 6/30/20 at 14:45


Phenylephrine HCl (PHENYLEPHRINE in 0.9% NACL PF) 1 mg STK-MED ONCE IV ;  Start 

6/30/20 at 14:44;  Stop 6/30/20 at 14:45;  Status DC


Ephedrine Sulfate (ePHEDrine PF IN SALINE SYRINGE) 50 mg STK-MED ONCE IV ;  

Start 6/30/20 at 14:45;  Stop 6/30/20 at 14:45;  Status DC


Vasopressin 20 unit/Dextrose 101 ml @  12 mls/hr CONT  PRN IV SEE I/O RECORD 

Last administered on 7/7/20at 04:17;  Start 6/30/20 at 15:30


Sodium Chloride 1,000 ml @  1,000 mls/hr 1X  ONCE IV  Last administered on 

6/30/20at 15:42;  Start 6/30/20 at 15:45;  Stop 6/30/20 at 16:44;  Status DC


Albumin Human 500 ml @  125 mls/hr 1X  ONCE IV ;  Start 6/30/20 at 16:00;  Stop 

6/30/20 at 19:59;  Status DC


Albumin Human 500 ml @  125 mls/hr PRN Q1HR  PRN IV PER PROTOCOL;  Start 6/30/20

at 15:45


Magnesium Sulfate 50 ml @ 25 mls/hr 1X  ONCE IV  Last administered on 6/30/20at 

17:02;  Start 6/30/20 at 16:30;  Stop 6/30/20 at 18:29;  Status DC


Sodium Bicarbonate (Sodium Bicarb Adult 8.4% Syr) 50 meq STK-MED ONCE .ROUTE ;  

Start 6/30/20 at 16:20;  Stop 6/30/20 at 16:20;  Status DC


Sodium Bicarbonate (Sodium Bicarb Adult 8.4% Syr) 100 meq 1X  ONCE IV  Last 

administered on 6/30/20at 17:07;  Start 6/30/20 at 16:30;  Stop 6/30/20 at 

16:31;  Status DC


Sodium Bicarbonate 150 meq/Dextrose 1,150 ml @  75 mls/hr 1X  ONCE IV  Last 

administered on 6/30/20at 20:02;  Start 6/30/20 at 16:30;  Stop 7/1/20 at 07:49;

 Status DC


Sodium Chloride 80 meq/Potassium Chloride 30 meq/ Potassium Acetate 30 

meq/Magnesium Sulfate 14 meq/ Multivitamins 10 ml/Chromium/ Copper/Manganese/ 

Seleni/Zn 1 ml/ Insulin Human Regular 15 unit/ Total Parenteral Nutrition/Amino 

Acids/Dextrose/ Fat Emulsion Intravenous 1,920 ml @  80 mls/hr TPN  CONT IV  

Last administered on 7/1/20at 23:05;  Start 7/1/20 at 22:00;  Stop 7/2/20 at 

21:59;  Status DC


Sodium Chloride 100 meq/Potassium Chloride 30 meq/ Potassium Acetate 30 

meq/Magnesium Sulfate 12 meq/ Multivitamins 10 ml/Chromium/ Copper/Manganese/ 

Seleni/Zn 1 ml/ Insulin Human Regular 15 unit/ Total Parenteral Nutrition/Amino 

Acids/Dextrose/ Fat Emulsion Intravenous 1,920 ml @  80 mls/hr TPN  CONT IV  

Last administered on 7/2/20at 21:52;  Start 7/2/20 at 22:00;  Stop 7/3/20 at 

21:59;  Status DC


Sodium Chloride 100 meq/Potassium Chloride 30 meq/ Potassium Acetate 30 

meq/Magnesium Sulfate 12 meq/ Multivitamins 10 ml/Chromium/ Copper/Manganese/ 

Seleni/Zn 1 ml/ Insulin Human Regular 15 unit/ Total Parenteral Nutrition/Amino 

Acids/Dextrose/ Fat Emulsion Intravenous 1,920 ml @  80 mls/hr TPN  CONT IV  

Last administered on 7/3/20at 21:46;  Start 7/3/20 at 22:00;  Stop 7/4/20 at 

21:59;  Status DC


Sodium Chloride 100 meq/Potassium Chloride 30 meq/ Potassium Acetate 30 

meq/Magnesium Sulfate 12 meq/ Multivitamins 10 ml/Chromium/ Copper/Manganese/ 

Seleni/Zn 1 ml/ Insulin Human Regular 15 unit/ Total Parenteral Nutrition/Amino 

Acids/Dextrose/ Fat Emulsion Intravenous 1,800 ml @  75 mls/hr TPN  CONT IV  

Last administered on 7/4/20at 22:04;  Start 7/4/20 at 22:00;  Stop 7/5/20 at 

21:59;  Status DC


Fentanyl Citrate 55 ml @ 0 mls/hr CONT  PRN IV SEE COMMENTS Last administered on

7/6/20at 23:55;  Start 7/4/20 at 13:00;  Stop 7/9/20 at 17:28;  Status DC


Sodium Chloride 100 meq/Potassium Chloride 30 meq/ Potassium Acetate 30 

meq/Magnesium Sulfate 12 meq/ Multivitamins 10 ml/Chromium/ Copper/Manganese/ 

Seleni/Zn 1 ml/ Insulin Human Regular 15 unit/ Total Parenteral Nutrition/Amino 

Acids/Dextrose/ Fat Emulsion Intravenous 1,680 ml @  70 mls/hr TPN  CONT IV  

Last administered on 7/5/20at 21:23;  Start 7/5/20 at 22:00;  Stop 7/6/20 at 21:

59;  Status DC


Sodium Chloride 110 meq/Potassium Chloride 30 meq/ Potassium Acetate 30 

meq/Magnesium Sulfate 15 meq/ Multivitamins 10 ml/Chromium/ Copper/Manganese/ 

Seleni/Zn 1 ml/ Insulin Human Regular 15 unit/ Total Parenteral Nutrition/Amino 

Acids/Dextrose/ Fat Emulsion Intravenous 1,680 ml @  70 mls/hr TPN  CONT IV  

Last administered on 7/6/20at 21:48;  Start 7/6/20 at 22:00;  Stop 7/7/20 at 

21:59;  Status DC


Sodium Chloride 110 meq/Potassium Chloride 30 meq/ Potassium Acetate 30 

meq/Magnesium Sulfate 15 meq/ Multivitamins 10 ml/Chromium/ Copper/Manganese/ 

Seleni/Zn 1 ml/ Insulin Human Regular 15 unit/ Total Parenteral Nutrition/Amino 

Acids/Dextrose/ Fat Emulsion Intravenous 1,680 ml @  70 mls/hr TPN  CONT IV  

Last administered on 7/7/20at 21:33;  Start 7/7/20 at 22:00;  Stop 7/8/20 at 

21:59;  Status DC


Sodium Chloride 110 meq/Potassium Chloride 30 meq/ Potassium Acetate 30 meq/M

agnesium Sulfate 15 meq/ Multivitamins 10 ml/Chromium/ Copper/Manganese/ 

Seleni/Zn 1 ml/ Insulin Human Regular 15 unit/ Total Parenteral Nutrition/Amino 

Acids/Dextrose/ Fat Emulsion Intravenous 1,680 ml @  70 mls/hr TPN  CONT IV  

Last administered on 7/8/20at 21:51;  Start 7/8/20 at 22:00;  Stop 7/9/20 at 

21:59;  Status DC


Sodium Chloride 90 meq/Potassium Chloride 30 meq/ Potassium Acetate 30 

meq/Magnesium Sulfate 15 meq/ Multivitamins 10 ml/Chromium/ Copper/Manganese/ 

Seleni/Zn 1 ml/ Insulin Human Regular 15 unit/ Total Parenteral Nutrition/Amino 

Acids/Dextrose/ Fat Emulsion Intravenous 1,680 ml @  70 mls/hr TPN  CONT IV  

Last administered on 7/9/20at 22:38;  Start 7/9/20 at 22:00;  Stop 7/10/20 at 

21:59;  Status DC


Fentanyl Citrate 30 ml @ 0 mls/hr CONT  PRN IV SEE I/O RECORD;  Start 7/9/20 at 

17:30


Fentanyl (Duragesic 12mcg/ Hr Patch) 1 patch Q3DAYS TD  Last administered on 

7/16/20at 08:27;  Start 7/10/20 at 09:00


Sodium Chloride 90 meq/Potassium Chloride 30 meq/ Potassium Acetate 30 

meq/Magnesium Sulfate 15 meq/ Multivitamins 10 ml/Chromium/ Copper/Manganese/ 

Seleni/Zn 1 ml/ Insulin Human Regular 15 unit/ Total Parenteral Nutrition/Amino 

Acids/Dextrose/ Fat Emulsion Intravenous 1,680 ml @  70 mls/hr TPN  CONT IV  

Last administered on 7/10/20at 21:59;  Start 7/10/20 at 22:00;  Stop 7/11/20 at 

21:59;  Status DC


Sodium Chloride 90 meq/Potassium Chloride 30 meq/ Potassium Acetate 30 me

q/Magnesium Sulfate 15 meq/ Multivitamins 10 ml/Chromium/ Copper/Manganese/ 

Seleni/Zn 1 ml/ Insulin Human Regular 15 unit/ Total Parenteral Nutrition/Amino 

Acids/Dextrose/ Fat Emulsion Intravenous 1,680 ml @  70 mls/hr TPN  CONT IV  

Last administered on 7/11/20at 21:35;  Start 7/11/20 at 22:00;  Stop 7/12/20 at 

21:59;  Status DC


Vancomycin HCl (Vanco Per Pharmacy) 1 each PRN DAILY  PRN MC SEE COMMENTS Last 

administered on 7/14/20at 02:46;  Start 7/12/20 at 09:15;  Stop 7/15/20 at 

07:41;  Status DC


Ciprofloxacin/ Dextrose 200 ml @  200 mls/hr Q12HR IV  Last administered on 

7/18/20at 09:37;  Start 7/12/20 at 10:00


Vancomycin HCl 2 gm/Sodium Chloride 500 ml @  250 mls/hr 1X  ONCE IV  Last 

administered on 7/12/20at 10:34;  Start 7/12/20 at 10:00;  Stop 7/12/20 at 

11:59;  Status DC


Sodium Chloride 90 meq/Potassium Chloride 30 meq/ Potassium Acetate 30 

meq/Magnesium Sulfate 15 meq/ Multivitamins 10 ml/Chromium/ Copper/Manganese/ 

Seleni/Zn 1 ml/ Insulin Human Regular 15 unit/ Total Parenteral Nutrition/Amino 

Acids/Dextrose/ Fat Emulsion Intravenous 1,680 ml @  70 mls/hr TPN  CONT IV  

Last administered on 7/12/20at 22:02;  Start 7/12/20 at 22:00;  Stop 7/13/20 at 

21:59;  Status DC


Diphenhydramine HCl (Benadryl) 25 mg 1X  ONCE IVP  Last administered on 

7/12/20at 14:26;  Start 7/12/20 at 14:30;  Stop 7/12/20 at 14:31;  Status DC


Vancomycin HCl 1.5 gm/Sodium Chloride 500 ml @  250 mls/hr Q8H IV  Last 

administered on 7/13/20at 03:08;  Start 7/12/20 at 18:30;  Stop 7/13/20 at 

12:24;  Status DC


Vancomycin HCl (Vancomycin Trough Level) 1 each 1X  ONCE MC  Last administered 

on 7/13/20at 10:00;  Start 7/13/20 at 10:00;  Stop 7/13/20 at 10:01;  Status DC


Sodium Chloride 90 meq/Potassium Chloride 30 meq/ Potassium Acetate 30 

meq/Magnesium Sulfate 15 meq/ Multivitamins 10 ml/Chromium/ Copper/Manganese/ 

Seleni/Zn 1 ml/ Insulin Human Regular 15 unit/ Total Parenteral Nutrition/Amino 

Acids/Dextrose/ Fat Emulsion Intravenous 1,680 ml @  70 mls/hr TPN  CONT IV  

Last administered on 7/13/20at 22:13;  Start 7/13/20 at 22:00;  Stop 7/14/20 at 

21:59;  Status DC


Vancomycin HCl (Vancomycin Random Level) 1 each 1X  ONCE MC  Last administered 

on 7/14/20at 01:00;  Start 7/14/20 at 01:00;  Stop 7/14/20 at 01:01;  Status DC


Vancomycin HCl 1.5 gm/Sodium Chloride 500 ml @  250 mls/hr Q12H IV  Last adm

inistered on 7/14/20at 22:07;  Start 7/14/20 at 10:00;  Stop 7/15/20 at 07:41;  

Status DC


Vancomycin HCl (Vancomycin Trough Level) 1 each 1X  ONCE MC ;  Start 7/15/20 at 

09:30;  Stop 7/15/20 at 09:31;  Status Cancel


Sodium Chloride 90 meq/Potassium Chloride 30 meq/ Potassium Acetate 30 

meq/Magnesium Sulfate 15 meq/ Multivitamins 10 ml/Chromium/ Copper/Manganese/ 

Seleni/Zn 1 ml/ Insulin Human Regular 15 unit/ Total Parenteral Nutrition/Amino 

Acids/Dextrose/ Fat Emulsion Intravenous 1,680 ml @  70 mls/hr TPN  CONT IV  

Last administered on 7/14/20at 22:08;  Start 7/14/20 at 22:00;  Stop 7/15/20 at 

21:59;  Status DC


Alteplase, Recombinant (Cathflo For Central Catheter Clearance) 1 mg 1X  ONCE 

INT CAT  Last administered on 7/14/20at 11:49;  Start 7/14/20 at 11:00;  Stop 

7/14/20 at 11:01;  Status DC


Daptomycin 500 mg/ Sodium Chloride 50 ml @  100 mls/hr Q24H IV  Last 

administered on 7/18/20at 09:33;  Start 7/15/20 at 09:00


Sodium Chloride 90 meq/Potassium Chloride 30 meq/ Potassium Acetate 30 

meq/Magnesium Sulfate 15 meq/ Multivitamins 10 ml/Chromium/ Copper/Manganese/ 

Seleni/Zn 1 ml/ Insulin Human Regular 15 unit/ Total Parenteral Nutrition/Amino 

Acids/Dextrose/ Fat Emulsion Intravenous 1,680 ml @  70 mls/hr TPN  CONT IV  

Last administered on 7/15/20at 22:55;  Start 7/15/20 at 22:00;  Stop 7/16/20 at 

21:59;  Status DC


Sodium Chloride 90 meq/Potassium Chloride 30 meq/ Potassium Acetate 30 me

q/Magnesium Sulfate 15 meq/ Multivitamins 10 ml/Chromium/ Copper/Manganese/ 

Seleni/Zn 1 ml/ Insulin Human Regular 15 unit/ Total Parenteral Nutrition/Amino 

Acids/Dextrose/ Fat Emulsion Intravenous 1,680 ml @  70 mls/hr TPN  CONT IV  

Last administered on 7/16/20at 22:06;  Start 7/16/20 at 22:00;  Stop 7/17/20 at 

21:59;  Status DC


Diphenhydramine HCl (Benadryl) 50 mg STK-MED ONCE .ROUTE ;  Start 7/16/20 at 

18:34;  Stop 7/16/20 at 18:35;  Status DC


Diphenhydramine HCl (Benadryl) 25 mg 1X  ONCE IM ;  Start 7/16/20 at 18:45;  

Stop 7/16/20 at 18:46;  Status DC


Diphenhydramine HCl (Benadryl) 25 mg 1X  ONCE IVP  Last administered on 7/16/20

at 18:56;  Start 7/16/20 at 19:00;  Stop 7/16/20 at 19:01;  Status DC


Alprazolam (Xanax) 0.5 mg PRN TID  PRN PO ANXIETY / AGITATION Last administered 

on 7/18/20at 09:34;  Start 7/17/20 at 08:00


Sodium Chloride 110 meq/Potassium Chloride 30 meq/ Potassium Acetate 30 

meq/Magnesium Sulfate 15 meq/ Multivitamins 10 ml/Chromium/ Copper/Manganese/ 

Seleni/Zn 1 ml/ Insulin Human Regular 15 unit/ Total Parenteral Nutrition/Amino 

Acids/Dextrose/ Fat Emulsion Intravenous 1,680 ml @  70 mls/hr TPN  CONT IV  

Last administered on 7/17/20at 21:21;  Start 7/17/20 at 22:00;  Stop 7/18/20 at 

21:59





Active Scripts


Active


Reported


Bisoprolol Fumarate 5 Mg Tablet 10 Mg PO DAILY


Vitals/I & O





Vital Sign - Last 24 Hours








 7/17/20 7/17/20 7/17/20 7/17/20





 11:00 11:59 12:00 12:00


 


Pulse 120   140


 


Resp 30   30


 


B/P (MAP) 161/105 (123)   168/114 (132)


 


Pulse Ox 99 98  99


 


O2 Delivery Tracheal Collar Tracheal Collar Trach Collar Tracheal Collar


 


O2 Flow Rate  8.0 8.0 





 7/17/20 7/17/20 7/17/20 7/17/20





 12:13 12:43 13:00 14:00


 


Temp   99.1 





   99.1 


 


Pulse   140 120


 


Resp   30 30


 


B/P (MAP)   170/107 (128) 170/104 (126)


 


Pulse Ox 98 98 99 99


 


O2 Delivery   Tracheal Collar Tracheal Collar


 


O2 Flow Rate 8.0 8.0  


 


    





    





 7/17/20 7/17/20 7/17/20 7/17/20





 15:00 16:00 16:00 16:18


 


Temp   98.8 





   98.8 


 


Resp 30  30 


 


B/P (MAP) 172/104 (126)   


 


Pulse Ox 99  99 98


 


O2 Delivery Tracheal Collar Trach Collar Tracheal Collar Tracheal Collar


 


O2 Flow Rate  8.0  8.0


 


    





    





 7/17/20 7/17/20 7/17/20 7/17/20





 17:00 18:00 18:08 18:38


 


Pulse  130  


 


Resp 30 30  


 


B/P (MAP)  212/123 (152)  


 


Pulse Ox 99 99 99 100


 


O2 Delivery Tracheal Collar Tracheal Collar  


 


O2 Flow Rate   8.0 8.0





 7/17/20 7/17/20 7/17/20 7/17/20





 19:00 20:00 20:00 20:13


 


Temp 98.7   





 98.7   


 


Pulse 128 127  


 


Resp 30 31  


 


B/P (MAP) 187/123 (144) 160/85 (110)  


 


Pulse Ox 98 100  100


 


O2 Delivery Tracheal Collar Tracheal Collar Trach Collar Tracheal Collar


 


O2 Flow Rate   8.0 8.0


 


    





    





 7/17/20 7/17/20 7/17/20 7/18/20





 21:00 22:00 23:00 00:00


 


Temp    98.6





    98.6


 


Pulse 126 123 120 116


 


Resp 31 30 30 30


 


B/P (MAP) 165/109 (127) 152/92 (112) 151/99 (116) 151/99 (116)


 


Pulse Ox 100 98 95 99


 


O2 Delivery Tracheal Collar Tracheal Collar Tracheal Collar Tracheal Collar


 


    





    





 7/18/20 7/18/20 7/18/20 7/18/20





 00:10 00:10 01:00 02:00


 


Pulse   119 117


 


Resp   30 28


 


B/P (MAP)   158/92 (114) 155/92 (113)


 


Pulse Ox 98  99 99


 


O2 Delivery Tracheal Collar Trach Collar Tracheal Collar Tracheal Collar


 


O2 Flow Rate 8.0 8.0  





 7/18/20 7/18/20 7/18/20 7/18/20





 03:00 04:00 04:11 04:12


 


Pulse 114 110  


 


Resp 24 24  


 


B/P (MAP) 153/88 (109) 154/97 (116)  


 


Pulse Ox 99 100  98


 


O2 Delivery Tracheal Collar Tracheal Collar Trach Collar Tracheal Collar


 


O2 Flow Rate   8.0 8.0





 7/18/20 7/18/20 7/18/20 





 05:00 06:00 08:53 


 


Temp 98.5   





 98.5   


 


Pulse 108 113  


 


Resp 24 24  


 


B/P (MAP) 135/82 (99) 135/93 (107)  


 


Pulse Ox 99 97 98 


 


O2 Delivery Tracheal Collar Tracheal Collar Tracheal Collar 


 


O2 Flow Rate   8.0 














Intake and Output   


 


 7/17/20 7/17/20 7/18/20





 15:00 23:00 07:00


 


Intake Total  1126 ml 729 ml


 


Output Total 700 ml 2380 ml 1625 ml


 


Balance -700 ml -1254 ml -896 ml











Justicifation of Admission Dx:


Justifications for Admission:


Justification of Admission Dx:  Yes











CHEY TURNER MD              Jul 18, 2020 10:13

## 2020-07-18 NOTE — PDOC
PULMONARY PROGRESS NOTES


Subjective


on trach shield 35%, has cough, is tired, appears comfortable


Vitals





Vital Signs








  Date Time  Temp Pulse Resp B/P (MAP) Pulse Ox O2 Delivery O2 Flow Rate FiO2


 


7/18/20 06:00  113 24 135/93 (107) 97 Tracheal Collar  


 


7/18/20 05:00 98.5       





 98.5       


 


7/18/20 04:12       8.0 








ROS:  No Nausea, No Chest Pain, No Increase Cough


General:  Alert


HEENT:  Other (trach site ok)


Lungs:  Crackles, Other (l ct)


Cardiovascular:  S1, S2


Abdomen:  Soft, Non-tender, Other (multiple ROBERT drains )


Neuro Exam:  Alert


Extremities:  Other (+1 BLE edema)


Skin:  Warm


Labs





Laboratory Tests








Test


 7/16/20


17:27 7/17/20


00:12 7/17/20


05:30 7/17/20


05:36


 


Glucose (Fingerstick)


 136 mg/dL


(70-99) 119 mg/dL


(70-99) 


 139 mg/dL


(70-99)


 


White Blood Count


 


 


 9.9 x10^3/uL


(4.0-11.0) 





 


Red Blood Count


 


 


 2.53 x10^6/uL


(3.50-5.40) 





 


Hemoglobin


 


 


 7.2 g/dL


(12.0-15.5) 





 


Hematocrit


 


 


 21.8 %


(36.0-47.0) 





 


Mean Corpuscular Volume   86 fL ()  


 


Mean Corpuscular Hemoglobin   29 pg (25-35)  


 


Mean Corpuscular Hemoglobin


Concent 


 


 33 g/dL


(31-37) 





 


Red Cell Distribution Width


 


 


 15.2 %


(11.5-14.5) 





 


Platelet Count


 


 


 625 x10^3/uL


(140-400) 





 


Neutrophils (%) (Auto)   69 % (31-73)  


 


Lymphocytes (%) (Auto)   12 % (24-48)  


 


Monocytes (%) (Auto)   16 % (0-9)  


 


Eosinophils (%) (Auto)   3 % (0-3)  


 


Basophils (%) (Auto)   1 % (0-3)  


 


Neutrophils # (Auto)


 


 


 6.9 x10^3/uL


(1.8-7.7) 





 


Lymphocytes # (Auto)


 


 


 1.2 x10^3/uL


(1.0-4.8) 





 


Monocytes # (Auto)


 


 


 1.6 x10^3/uL


(0.0-1.1) 





 


Eosinophils # (Auto)


 


 


 0.3 x10^3/uL


(0.0-0.7) 





 


Basophils # (Auto)


 


 


 0.0 x10^3/uL


(0.0-0.2) 





 


Sodium Level


 


 


 135 mmol/L


(136-145) 





 


Potassium Level


 


 


 4.1 mmol/L


(3.5-5.1) 





 


Chloride Level


 


 


 101 mmol/L


() 





 


Carbon Dioxide Level


 


 


 29 mmol/L


(21-32) 





 


Anion Gap   5 (6-14)  


 


Blood Urea Nitrogen


 


 


 10 mg/dL


(7-20) 





 


Creatinine


 


 


 0.6 mg/dL


(0.6-1.0) 





 


Estimated GFR


(Cockcroft-Gault) 


 


 106.3 


 





 


Glucose Level


 


 


 137 mg/dL


(70-99) 





 


Calcium Level


 


 


 9.1 mg/dL


(8.5-10.1) 





 


Test


 7/17/20


12:14 7/17/20


17:57 7/18/20


05:45 





 


Glucose (Fingerstick)


 138 mg/dL


(70-99) 130 mg/dL


(70-99) 


 





 


White Blood Count


 


 


 12.2 x10^3/uL


(4.0-11.0) 





 


Red Blood Count


 


 


 2.77 x10^6/uL


(3.50-5.40) 





 


Hemoglobin


 


 


 7.8 g/dL


(12.0-15.5) 





 


Hematocrit


 


 


 23.9 %


(36.0-47.0) 





 


Mean Corpuscular Volume   86 fL ()  


 


Mean Corpuscular Hemoglobin   28 pg (25-35)  


 


Mean Corpuscular Hemoglobin


Concent 


 


 33 g/dL


(31-37) 





 


Red Cell Distribution Width


 


 


 15.2 %


(11.5-14.5) 





 


Platelet Count


 


 


 666 x10^3/uL


(140-400) 





 


Neutrophils (%) (Auto)   70 % (31-73)  


 


Lymphocytes (%) (Auto)   12 % (24-48)  


 


Monocytes (%) (Auto)   13 % (0-9)  


 


Eosinophils (%) (Auto)   4 % (0-3)  


 


Basophils (%) (Auto)   1 % (0-3)  


 


Neutrophils # (Auto)


 


 


 8.6 x10^3/uL


(1.8-7.7) 





 


Lymphocytes # (Auto)


 


 


 1.5 x10^3/uL


(1.0-4.8) 





 


Monocytes # (Auto)


 


 


 1.6 x10^3/uL


(0.0-1.1) 





 


Eosinophils # (Auto)


 


 


 0.5 x10^3/uL


(0.0-0.7) 





 


Basophils # (Auto)


 


 


 0.1 x10^3/uL


(0.0-0.2) 





 


Sodium Level


 


 


 134 mmol/L


(136-145) 





 


Potassium Level


 


 


 4.2 mmol/L


(3.5-5.1) 





 


Chloride Level


 


 


 101 mmol/L


() 





 


Carbon Dioxide Level


 


 


 30 mmol/L


(21-32) 





 


Anion Gap   3 (6-14)  


 


Blood Urea Nitrogen


 


 


 11 mg/dL


(7-20) 





 


Creatinine


 


 


 0.6 mg/dL


(0.6-1.0) 





 


Estimated GFR


(Cockcroft-Gault) 


 


 106.3 


 





 


Glucose Level


 


 


 138 mg/dL


(70-99) 





 


Calcium Level


 


 


 9.5 mg/dL


(8.5-10.1) 











Laboratory Tests








Test


 7/17/20


12:14 7/17/20


17:57 7/18/20


05:45


 


Glucose (Fingerstick)


 138 mg/dL


(70-99) 130 mg/dL


(70-99) 





 


White Blood Count


 


 


 12.2 x10^3/uL


(4.0-11.0)


 


Red Blood Count


 


 


 2.77 x10^6/uL


(3.50-5.40)


 


Hemoglobin


 


 


 7.8 g/dL


(12.0-15.5)


 


Hematocrit


 


 


 23.9 %


(36.0-47.0)


 


Mean Corpuscular Volume   86 fL () 


 


Mean Corpuscular Hemoglobin   28 pg (25-35) 


 


Mean Corpuscular Hemoglobin


Concent 


 


 33 g/dL


(31-37)


 


Red Cell Distribution Width


 


 


 15.2 %


(11.5-14.5)


 


Platelet Count


 


 


 666 x10^3/uL


(140-400)


 


Neutrophils (%) (Auto)   70 % (31-73) 


 


Lymphocytes (%) (Auto)   12 % (24-48) 


 


Monocytes (%) (Auto)   13 % (0-9) 


 


Eosinophils (%) (Auto)   4 % (0-3) 


 


Basophils (%) (Auto)   1 % (0-3) 


 


Neutrophils # (Auto)


 


 


 8.6 x10^3/uL


(1.8-7.7)


 


Lymphocytes # (Auto)


 


 


 1.5 x10^3/uL


(1.0-4.8)


 


Monocytes # (Auto)


 


 


 1.6 x10^3/uL


(0.0-1.1)


 


Eosinophils # (Auto)


 


 


 0.5 x10^3/uL


(0.0-0.7)


 


Basophils # (Auto)


 


 


 0.1 x10^3/uL


(0.0-0.2)


 


Sodium Level


 


 


 134 mmol/L


(136-145)


 


Potassium Level


 


 


 4.2 mmol/L


(3.5-5.1)


 


Chloride Level


 


 


 101 mmol/L


()


 


Carbon Dioxide Level


 


 


 30 mmol/L


(21-32)


 


Anion Gap   3 (6-14) 


 


Blood Urea Nitrogen


 


 


 11 mg/dL


(7-20)


 


Creatinine


 


 


 0.6 mg/dL


(0.6-1.0)


 


Estimated GFR


(Cockcroft-Gault) 


 


 106.3 





 


Glucose Level


 


 


 138 mg/dL


(70-99)


 


Calcium Level


 


 


 9.5 mg/dL


(8.5-10.1)








Medications





Active Scripts








 Medications  Dose


 Route/Sig


 Max Daily Dose Days Date Category


 


 Bisoprolol


 Fumarate 5 Mg


 Tablet  10 Mg


 PO DAILY


   3/16/20 Reported








Comments


cxr 7/13, reviewed,   b ll infilt effusion atelectasis





ct reviewed 7/12/20, Decreased left-sided effusion after catheter placement. The




right-sided effusion has increased as has atelectasis.


 





 


There has been exchange or placement of multiple drainage 


tubes and a gastrojejunostomy tube. Both collections are smaller. No 


significant new abdominal fluid collection is seen. The jejunal component 


of the gastrojejunostomy tube appears to be looped in the proximal small 


bowel. 











ct abdomen /pelvis 6/6


1. Removal of the percutaneous pigtail drainage catheters since the prior 


exam. Sequela of pancreatitis with extensive pseudocysts again 


demonstrated, the right-sided collections are slightly larger since the 


prior exam, the left-sided collections are stable. See above.


2. Moderate to large left pleural effusion with atelectasis and collapse 


of most of the left lower lobe, stable. Small right pleural effusion is 


stable.


3. Gallstone.





ct chest 6/15 reviewed








 GRAM NEG COCCOBACILLI:MANY


        SQUAMOUS EPI CELL:RARE


        PMN (WBCs):FEW


        Unless otherwise specified, Testing Performed by:


        [facility]


        [address] [place] [address]


        For Inquires, the Physician may contact the Microbiology


        department at [phone]





  RESPIRATORY CULTURE  Final  


        Final





        MANY GRAM NEGATIVE RODS on 06/15/20 at 1107


        FINAL ID= [PSEUDOMONAS [id number]]


        MICRO CHARGES


        PSEUDOMONAS AERUGINOSA





  ANTIMICROBIAL SUSCEPTIBILITY  Final  


        Comment





        NEG ANSON 56


        PSEUDOMONAS AERUGINOSA


        ANTIBIOTIC                        RESULT          INTERPRETATION


        AMIKACIN                          <=16                  S


        AZTREONAM                         <=4                   S


        CEFTAZIDIME                       <=1                   S


        CIPROFLOXACIN                     <=0.25                S


        CEFEPIME                          <=2                   S


        CEFTAZIDIME/AVIBACTAM             <=4                   S


        GENTAMICIN                        <=2                   S


        LEVOFLOXACIN                      <=0.5                 S





Impression


.





IMPRESSION:


1.  Acute hypoxemic respiratory failure secondary to ARDS status post trach, 

developed anemia 6/7, blood drainage from RLQ abdomen drain site, and 

surrounding firmness  / developed septic shock 6/7 from abdomen source, required

levo 6/7


s/p 3 new drains 6/7 with brown color drainage,  


2.  Gallstone pancreatitis, now with ongoing bleeding from prior drain. Anemic. 

s/p Tx multiple units over several days


3.  septic shock/sepsis, recurrent 6/7, source abdomen. new fever  ? aspiration 

pneumonitis/pneumonia


4.  Acute kidney injury-, Off HD--renal function decling. suspect JED on CKD due

to hypotension , improved now


5.  Acute gallstone pancreatitis.


6.  Hypoalbuminemia.


7.  Moderate persistent effusions, s/p left thora  5/12, reaccumulation of left 

effusion. O2 requirement not changed. 


8.  Fever- ,hypotension. suspect recurrent sepsis/ likely pancreatic source.  

Per ID, per surgery--


9.  Chronic anemia-- ongoing / s/p PRBC


10. Covid 19 testing negative


11. Moderate to large ascites-S/P paracentisis


12.S/P paracentisis with 4 liters removed on 4/15/20


13. S/P IR drain placement on 5/8/2020, removal, re inserted 6/7


14. Depression/Anxiety 


15.,  Fever, per ID


16.  Status post chest tube placement, not much drainage


6/30


S/P Exploratory laparotomy, lysis of adhesions, subtotal cholecystectomy with 

cholangiogram, gastrojejunostomy tube placement, pancreatic necrosectomy


leukocytosis- improving





Plan


.


Chest tube continues to drain


Continue current support, monitor H&H, afebrile 


Trach shield


Up to chair, pt/ot


Follow culture


Follow surgery input


DVT GI prophylaxis


ABX per ID 


f/u BC /resp cultures 


Continue TPN for nutrition support 


DVT/GI PPX


D/W RN and RT,











MAN BLAKE MD               Jul 18, 2020 06:42

## 2020-07-18 NOTE — PDOC
PROGRESS NOTES


Subjective


Subjective


pt awake, on trach shield





Objective


Objective





Vital Signs








  Date Time  Temp Pulse Resp B/P (MAP) Pulse Ox O2 Delivery O2 Flow Rate FiO2


 


7/18/20 08:53     98 Tracheal Collar 8.0 


 


7/18/20 06:00  113 24 135/93 (107)    


 


7/18/20 05:00 98.5       





 98.5       














Intake and Output 


 


 7/18/20





 07:00


 


Intake Total 1855 ml


 


Output Total 4705 ml


 


Balance -2850 ml


 


 


 


IV Total 1855 ml


 


Output Urine Total 3245 ml


 


Chest Tube Drainage Total 40 ml


 


Drainage Total 1420 ml


 


# Bowel Movements 1











Physical Exam


Physical Exam


abdomen soft,  drains in place;  largest drain had been gradually pulling back, 

is now out of the abdomen





Assessment


Assessment


Problems


Medical Problems:


(1) Acute pancreatitis


Status: Acute  





(2) Cholelithiasis


Status: Acute  











Plan


Plan of Care


Continue drains, supportive care;  Dr Flores to follow up again Monday,  call if 

needed sooner





Comment


Review of Relevant


I have reviewed the following items josy (where applicable) has been applied.


Labs





Laboratory Tests








Test


 7/16/20


17:27 7/17/20


00:12 7/17/20


05:30 7/17/20


05:36


 


Glucose (Fingerstick)


 136 mg/dL


(70-99) 119 mg/dL


(70-99) 


 139 mg/dL


(70-99)


 


White Blood Count


 


 


 9.9 x10^3/uL


(4.0-11.0) 





 


Red Blood Count


 


 


 2.53 x10^6/uL


(3.50-5.40) 





 


Hemoglobin


 


 


 7.2 g/dL


(12.0-15.5) 





 


Hematocrit


 


 


 21.8 %


(36.0-47.0) 





 


Mean Corpuscular Volume   86 fL ()  


 


Mean Corpuscular Hemoglobin   29 pg (25-35)  


 


Mean Corpuscular Hemoglobin


Concent 


 


 33 g/dL


(31-37) 





 


Red Cell Distribution Width


 


 


 15.2 %


(11.5-14.5) 





 


Platelet Count


 


 


 625 x10^3/uL


(140-400) 





 


Neutrophils (%) (Auto)   69 % (31-73)  


 


Lymphocytes (%) (Auto)   12 % (24-48)  


 


Monocytes (%) (Auto)   16 % (0-9)  


 


Eosinophils (%) (Auto)   3 % (0-3)  


 


Basophils (%) (Auto)   1 % (0-3)  


 


Neutrophils # (Auto)


 


 


 6.9 x10^3/uL


(1.8-7.7) 





 


Lymphocytes # (Auto)


 


 


 1.2 x10^3/uL


(1.0-4.8) 





 


Monocytes # (Auto)


 


 


 1.6 x10^3/uL


(0.0-1.1) 





 


Eosinophils # (Auto)


 


 


 0.3 x10^3/uL


(0.0-0.7) 





 


Basophils # (Auto)


 


 


 0.0 x10^3/uL


(0.0-0.2) 





 


Sodium Level


 


 


 135 mmol/L


(136-145) 





 


Potassium Level


 


 


 4.1 mmol/L


(3.5-5.1) 





 


Chloride Level


 


 


 101 mmol/L


() 





 


Carbon Dioxide Level


 


 


 29 mmol/L


(21-32) 





 


Anion Gap   5 (6-14)  


 


Blood Urea Nitrogen


 


 


 10 mg/dL


(7-20) 





 


Creatinine


 


 


 0.6 mg/dL


(0.6-1.0) 





 


Estimated GFR


(Cockcroft-Gault) 


 


 106.3 


 





 


Glucose Level


 


 


 137 mg/dL


(70-99) 





 


Calcium Level


 


 


 9.1 mg/dL


(8.5-10.1) 





 


Test


 7/17/20


12:14 7/17/20


17:57 7/18/20


05:45 





 


Glucose (Fingerstick)


 138 mg/dL


(70-99) 130 mg/dL


(70-99) 


 





 


White Blood Count


 


 


 12.2 x10^3/uL


(4.0-11.0) 





 


Red Blood Count


 


 


 2.77 x10^6/uL


(3.50-5.40) 





 


Hemoglobin


 


 


 7.8 g/dL


(12.0-15.5) 





 


Hematocrit


 


 


 23.9 %


(36.0-47.0) 





 


Mean Corpuscular Volume   86 fL ()  


 


Mean Corpuscular Hemoglobin   28 pg (25-35)  


 


Mean Corpuscular Hemoglobin


Concent 


 


 33 g/dL


(31-37) 





 


Red Cell Distribution Width


 


 


 15.2 %


(11.5-14.5) 





 


Platelet Count


 


 


 666 x10^3/uL


(140-400) 





 


Neutrophils (%) (Auto)   70 % (31-73)  


 


Lymphocytes (%) (Auto)   12 % (24-48)  


 


Monocytes (%) (Auto)   13 % (0-9)  


 


Eosinophils (%) (Auto)   4 % (0-3)  


 


Basophils (%) (Auto)   1 % (0-3)  


 


Neutrophils # (Auto)


 


 


 8.6 x10^3/uL


(1.8-7.7) 





 


Lymphocytes # (Auto)


 


 


 1.5 x10^3/uL


(1.0-4.8) 





 


Monocytes # (Auto)


 


 


 1.6 x10^3/uL


(0.0-1.1) 





 


Eosinophils # (Auto)


 


 


 0.5 x10^3/uL


(0.0-0.7) 





 


Basophils # (Auto)


 


 


 0.1 x10^3/uL


(0.0-0.2) 





 


Sodium Level


 


 


 134 mmol/L


(136-145) 





 


Potassium Level


 


 


 4.2 mmol/L


(3.5-5.1) 





 


Chloride Level


 


 


 101 mmol/L


() 





 


Carbon Dioxide Level


 


 


 30 mmol/L


(21-32) 





 


Anion Gap   3 (6-14)  


 


Blood Urea Nitrogen


 


 


 11 mg/dL


(7-20) 





 


Creatinine


 


 


 0.6 mg/dL


(0.6-1.0) 





 


Estimated GFR


(Cockcroft-Gault) 


 


 106.3 


 





 


Glucose Level


 


 


 138 mg/dL


(70-99) 





 


Calcium Level


 


 


 9.5 mg/dL


(8.5-10.1) 











Laboratory Tests








Test


 7/17/20


12:14 7/17/20


17:57 7/18/20


05:45


 


Glucose (Fingerstick)


 138 mg/dL


(70-99) 130 mg/dL


(70-99) 





 


White Blood Count


 


 


 12.2 x10^3/uL


(4.0-11.0)


 


Red Blood Count


 


 


 2.77 x10^6/uL


(3.50-5.40)


 


Hemoglobin


 


 


 7.8 g/dL


(12.0-15.5)


 


Hematocrit


 


 


 23.9 %


(36.0-47.0)


 


Mean Corpuscular Volume   86 fL () 


 


Mean Corpuscular Hemoglobin   28 pg (25-35) 


 


Mean Corpuscular Hemoglobin


Concent 


 


 33 g/dL


(31-37)


 


Red Cell Distribution Width


 


 


 15.2 %


(11.5-14.5)


 


Platelet Count


 


 


 666 x10^3/uL


(140-400)


 


Neutrophils (%) (Auto)   70 % (31-73) 


 


Lymphocytes (%) (Auto)   12 % (24-48) 


 


Monocytes (%) (Auto)   13 % (0-9) 


 


Eosinophils (%) (Auto)   4 % (0-3) 


 


Basophils (%) (Auto)   1 % (0-3) 


 


Neutrophils # (Auto)


 


 


 8.6 x10^3/uL


(1.8-7.7)


 


Lymphocytes # (Auto)


 


 


 1.5 x10^3/uL


(1.0-4.8)


 


Monocytes # (Auto)


 


 


 1.6 x10^3/uL


(0.0-1.1)


 


Eosinophils # (Auto)


 


 


 0.5 x10^3/uL


(0.0-0.7)


 


Basophils # (Auto)


 


 


 0.1 x10^3/uL


(0.0-0.2)


 


Sodium Level


 


 


 134 mmol/L


(136-145)


 


Potassium Level


 


 


 4.2 mmol/L


(3.5-5.1)


 


Chloride Level


 


 


 101 mmol/L


()


 


Carbon Dioxide Level


 


 


 30 mmol/L


(21-32)


 


Anion Gap   3 (6-14) 


 


Blood Urea Nitrogen


 


 


 11 mg/dL


(7-20)


 


Creatinine


 


 


 0.6 mg/dL


(0.6-1.0)


 


Estimated GFR


(Cockcroft-Gault) 


 


 106.3 





 


Glucose Level


 


 


 138 mg/dL


(70-99)


 


Calcium Level


 


 


 9.5 mg/dL


(8.5-10.1)








Microbiology


7/15/20 Blood Culture - Preliminary, Resulted


          NO GROWTH AFTER 2 DAYS


7/12/20 Gram Stain Evaluation - Final, Complete


          


7/12/20 Respiratory Culture - Final, Complete


          


7/12/20 Antimicrobic Susceptibility - Final, Complete


          


6/30/20 Gram Stain - Final, Complete


          


6/30/20 Aerobic and Anaerobic Culture - Final, Complete


          


6/30/20 Antimicrobic Susceptibility - Final, Complete


          


6/15/20 Gram Stain - Final, Complete


          


6/15/20 Aerobic and Anaerobic Culture - Final, Complete


          


6/7/20 Urine Culture - Final, Complete


         


5/30/20 Gram Stain - Final, Complete


          


5/30/20 Aerobic Culture - Final, Complete


Medications





Current Medications


Sodium Chloride 1,000 ml @  1,000 mls/hr Q1H IV  Last administered on 3/16/20at 

03:00;  Start 3/16/20 at 03:00;  Stop 3/16/20 at 03:59;  Status DC


Ondansetron HCl (Zofran) 4 mg 1X  ONCE IVP  Last administered on 3/16/20at 

03:27;  Start 3/16/20 at 03:00;  Stop 3/16/20 at 03:01;  Status DC


Morphine Sulfate (Morphine Sulfate) 4 mg 1X  ONCE IV ;  Start 3/16/20 at 03:00; 

Stop 3/16/20 at 03:01;  Status Cancel


Ketorolac Tromethamine (Toradol 30mg Vial) 30 mg 1X  ONCE IV  Last administered 

on 3/16/20at 02:54;  Start 3/16/20 at 03:00;  Stop 3/16/20 at 03:01;  Status DC


Fentanyl Citrate (Fentanyl 2ml Vial) 25 mcg 1X  ONCE IVP  Last administered on 

3/16/20at 03:23;  Start 3/16/20 at 03:30;  Stop 3/16/20 at 03:31;  Status DC


Fentanyl Citrate (Fentanyl 2ml Vial) 100 mcg STK-MED ONCE .ROUTE ;  Start 

3/16/20 at 03:18;  Stop 3/16/20 at 03:18;  Status DC


Iohexol (Omnipaque 350 Mg/ml) 90 ml 1X  ONCE IV  Last administered on 3/16/20at 

03:25;  Start 3/16/20 at 03:30;  Stop 3/16/20 at 03:31;  Status DC


Info (CONTRAST GIVEN -- Rx MONITORING) 1 each PRN DAILY  PRN MC SEE COMMENTS;  

Start 3/16/20 at 03:30;  Stop 3/18/20 at 03:29;  Status DC


Hydromorphone HCl (Dilaudid) 0.5 mg 1X  ONCE IV  Last administered on 3/16/20at 

03:55;  Start 3/16/20 at 04:30;  Stop 3/16/20 at 04:32;  Status DC


Ondansetron HCl (Zofran) 4 mg PRN Q8HRS  PRN IV NAUSEA/VOMITING 1ST CHOICE;  

Start 3/16/20 at 05:00;  Stop 3/16/20 at 09:27;  Status DC


Morphine Sulfate (Morphine Sulfate) 2 mg PRN Q2HR  PRN IV SEVERE PAIN 7-10 Last 

administered on 3/17/20at 12:26;  Start 3/16/20 at 05:00;  Stop 3/17/20 at 

14:15;  Status DC


Sodium Chloride 1,000 ml @  125 mls/hr Q8H IV  Last administered on 3/16/20at 

20:56;  Start 3/16/20 at 05:00;  Stop 3/17/20 at 04:59;  Status DC


Hydromorphone HCl (Dilaudid) 0.5 mg PRN Q3HRS  PRN IV SEVERE PAIN 7-10 Last 

administered on 3/17/20at 10:06;  Start 3/16/20 at 05:00;  Stop 3/17/20 at 

12:01;  Status DC


Piperacillin Sod/ Tazobactam Sod 4.5 gm/Sodium Chloride 100 ml @  200 mls/hr 1X 

ONCE IV  Last administered on 3/16/20at 05:44;  Start 3/16/20 at 06:00;  Stop 

3/16/20 at 06:29;  Status DC


Ondansetron HCl (Zofran) 4 mg PRN Q4HRS  PRN IV NAUSEA/VOMITING 1ST CHOICE Last 

administered on 7/16/20at 08:18;  Start 3/16/20 at 09:30


Insulin Human Lispro (HumaLOG) 0-9 UNITS Q6HRS SQ  Last administered on 7/12/20

at 12:43;  Start 3/16/20 at 09:30


Dextrose (Dextrose 50%-Water Syringe) 12.5 gm PRN Q15MIN  PRN IV SEE COMMENTS;  

Start 3/16/20 at 09:30


Pantoprazole Sodium (PROTONIX VIAL for IV PUSH) 40 mg DAILYAC IVP  Last 

administered on 7/18/20at 09:35;  Start 3/16/20 at 11:30


Prochlorperazine Edisylate (Compazine) 10 mg PRN Q6HRS  PRN IV NAUSEA/VOMITING, 

2nd CHOICE Last administered on 7/13/20at 20:17;  Start 3/16/20 at 17:45


Atenolol (Tenormin) 100 mg DAILY PO ;  Start 3/17/20 at 09:00;  Stop 3/16/20 at 

20:08;  Status DC


Metoprolol Tartrate (Lopressor Vial) 2.5 mg Q6HRS IVP  Last administered on 

3/17/20at 05:51;  Start 3/16/20 at 20:15;  Stop 3/17/20 at 10:02;  Status DC


Metoprolol Tartrate (Lopressor Vial) 5 mg Q6HRS IVP  Last administered on 

3/26/20at 00:12;  Start 3/17/20 at 10:15;  Stop 3/28/20 at 08:48;  Status DC


Hydromorphone HCl (Dilaudid) 1 mg PRN Q3HRS  PRN IV SEVERE PAIN 7-10 Last a

dministered on 3/23/20at 05:13;  Start 3/17/20 at 12:00;  Stop 3/31/20 at 00:25;

 Status DC


Lidocaine HCl (Buffered Lidocaine 1%) 3 ml STK-MED ONCE .ROUTE ;  Start 3/17/20 

at 12:55;  Stop 3/17/20 at 12:56;  Status DC


Albumin Human 500 ml @  125 mls/hr 1X  ONCE IV  Last administered on 3/17/20at 

14:33;  Start 3/17/20 at 14:30;  Stop 3/17/20 at 18:32;  Status DC


Norepinephrine Bitartrate 8 mg/ Dextrose 258 ml @  17.299 mls/ hr CONT  PRN IV 

PER PROTOCOL Last administered on 4/14/20at 12:48;  Start 3/17/20 at 15:30;  

Stop 4/17/20 at 09:19;  Status DC


Sodium Chloride 1,000 ml @  125 mls/hr Q8H IV  Last administered on 3/17/20at 

21:04;  Start 3/17/20 at 16:00;  Stop 3/18/20 at 02:42;  Status DC


Albumin Human 500 ml @  125 mls/hr PRN BID  PRN IV After every 2L NSS & BP < 

90mm Last administered on 6/30/20at 16:06;  Start 3/17/20 at 16:00;  Stop 7/3/20

at 09:30;  Status DC


Iohexol (Omnipaque 300 Mg/ml) 60 ml 1X  ONCE IV  Last administered on 3/17/20at 

17:20;  Start 3/17/20 at 17:00;  Stop 3/17/20 at 17:01;  Status DC


Info (CONTRAST GIVEN -- Rx MONITORING) 1 each PRN DAILY  PRN MC SEE COMMENTS;  

Start 3/17/20 at 17:00;  Stop 3/19/20 at 16:59;  Status DC


Meropenem 1 gm/ Sodium Chloride 100 ml @  200 mls/hr Q8HRS IV  Last administered

on 3/18/20at 05:45;  Start 3/17/20 at 20:00;  Stop 3/18/20 at 08:48;  Status DC


Furosemide (Lasix) 40 mg 1X  ONCE IVP  Last administered on 3/17/20at 22:12;  

Start 3/17/20 at 22:30;  Stop 3/17/20 at 22:31;  Status DC


Calcium Chloride 1000 mg/Sodium Chloride 110 ml @  220 mls/hr 1X  ONCE IV  Last 

administered on 3/17/20at 22:11;  Start 3/17/20 at 22:30;  Stop 3/17/20 at 

22:59;  Status DC


Albuterol Sulfate (Ventolin Neb Soln) 2.5 mg 1X  ONCE NEB  Last administered on 

3/18/20at 00:56;  Start 3/17/20 at 22:30;  Stop 3/17/20 at 22:31;  Status DC


Insulin Human Regular (HumuLIN R VIAL) 5 unit 1X  ONCE IV  Last administered on 

3/17/20at 22:14;  Start 3/17/20 at 22:30;  Stop 3/17/20 at 22:31;  Status DC


Magnesium Sulfate 50 ml @ 25 mls/hr 1X  ONCE IV  Last administered on 3/18/20at 

02:57;  Start 3/18/20 at 03:00;  Stop 3/18/20 at 04:59;  Status DC


Calcium Gluconate 1000 mg/Sodium Chloride 110 ml @  220 mls/hr 1X  ONCE IV  Last

administered on 3/18/20at 02:46;  Start 3/18/20 at 03:00;  Stop 3/18/20 at 

03:29;  Status DC


Sodium Chloride 1,000 ml @  200 mls/hr Q5H IV  Last administered on 3/18/20at 

02:46;  Start 3/18/20 at 03:00;  Stop 3/18/20 at 10:21;  Status DC


Calcium Gluconate 1000 mg/Sodium Chloride 110 ml @  220 mls/hr 1X  ONCE IV  Last

administered on 3/18/20at 03:21;  Start 3/18/20 at 03:30;  Stop 3/18/20 at 

03:59;  Status DC


Sodium Bicarbonate 50 meq/Sodium Chloride 1,050 ml @  75 mls/hr Q14H IV  Last 

administered on 3/22/20at 21:10;  Start 3/18/20 at 07:30;  Stop 3/23/20 at 

10:28;  Status DC


Calcium Gluconate 2000 mg/Sodium Chloride 120 ml @  220 mls/hr 1X  ONCE IV  Last

administered on 3/18/20at 09:05;  Start 3/18/20 at 07:30;  Stop 3/18/20 at 

08:02;  Status DC


Lidocaine HCl (Xylocaine-Mpf 1% 2ml Vial) 2 ml STK-MED ONCE .ROUTE ;  Start 

3/18/20 at 08:47;  Stop 3/18/20 at 08:47;  Status DC


Meropenem 500 mg/ Sodium Chloride 50 ml @  100 mls/hr Q12HR IV  Last 

administered on 3/23/20at 21:01;  Start 3/18/20 at 18:00;  Stop 3/24/20 at 

07:58;  Status DC


Lidocaine HCl (Buffered Lidocaine 1%) 3 ml STK-MED ONCE .ROUTE ;  Start 3/18/20 

at 09:46;  Stop 3/18/20 at 09:46;  Status DC


Lidocaine HCl (Buffered Lidocaine 1%) 6 ml 1X  ONCE INJ  Last administered on 

3/18/20at 10:26;  Start 3/18/20 at 10:15;  Stop 3/18/20 at 10:16;  Status DC


Info (Tpn Per Pharmacy) 1 each PRN DAILY  PRN MC SEE COMMENTS Last administered 

on 7/17/20at 10:02;  Start 3/18/20 at 12:00


Sodium Chloride 1,000 ml @  1,000 mls/hr Q1H PRN IV hypotension;  Start 3/18/20 

at 12:07;  Stop 3/18/20 at 18:06;  Status DC


Diphenhydramine HCl (Benadryl) 25 mg 1X PRN  PRN IV ITCHING;  Start 3/18/20 at 

12:15;  Stop 3/19/20 at 12:14;  Status DC


Diphenhydramine HCl (Benadryl) 25 mg 1X PRN  PRN IV ITCHING;  Start 3/18/20 at 

12:15;  Stop 3/19/20 at 12:14;  Status DC


Sodium Chloride 1,000 ml @  400 mls/hr Q2H30M PRN IV PATENCY;  Start 3/18/20 at 

12:07;  Stop 3/19/20 at 00:06;  Status DC


Info (PHARMACY MONITORING -- do not chart) 1 each PRN DAILY  PRN MC SEE 

COMMENTS;  Start 3/18/20 at 12:15;  Stop 3/20/20 at 08:13;  Status DC


Sodium Chloride 90 meq/Calcium Gluconate 10 meq/ Multivitamins 10 ml/Chromium/ 

Copper/Manganese/ Seleni/Zn 1 ml/ Total Parenteral Nutrition/Amino 

Acids/Dextrose/ Fat Emulsion Intravenous 55.005 ml  @ 2.292 mls/hr TPN  CONT IV 

;  Start 3/18/20 at 22:00;  Stop 3/18/20 at 12:33;  Status DC


Info (Tpn Per Pharmacy) 1 each PRN DAILY  PRN MC SEE COMMENTS;  Start 3/18/20 at

12:30;  Status UNV


Sodium Chloride 90 meq/Calcium Gluconate 10 meq/ Multivitamins 10 ml/Chromium/ 

Copper/Manganese/ Seleni/Zn 0.5 ml/ Total Parenteral Nutrition/Amino 

Acids/Dextrose/ Fat Emulsion Intravenous 1,512 ml @  63 mls/hr TPN  CONT IV  

Last administered on 3/18/20at 22:06;  Start 3/18/20 at 22:00;  Stop 3/19/20 at 

21:59;  Status DC


Calcium Carbonate/ Glycine (Tums) 500 mg PRN AFTMEALHC  PRN PO INDIGESTION;  

Start 3/18/20 at 17:45;  Stop 5/13/20 at 10:25;  Status DC


Calcium Gluconate (Calcium Gluconate) 2,000 mg 1X  ONCE IVP  Last administered 

on 3/19/20at 02:19;  Start 3/19/20 at 02:15;  Stop 3/19/20 at 02:16;  Status DC


Calcium Chloride 3000 mg/Sodium Chloride 1,030 ml @  50 mls/hr [id number] IV  Last 

administered on 3/21/20at 02:17;  Start 3/19/20 at 08:00;  Stop 3/21/20 at 

15:23;  Status DC


Lorazepam (Ativan Inj) 1 mg PRN Q4HRS  PRN IVP ANXIETY / AGITATION, 2nd choic 

Last administered on 4/17/20at 03:51;  Start 3/19/20 at 09:00;  Stop 4/17/20 at 

09:19;  Status DC


Sodium Chloride 1,000 ml @  1,000 mls/hr Q1H PRN IV hypotension;  Start 3/19/20 

at 08:56;  Stop 3/19/20 at 14:55;  Status DC


Albumin Human 200 ml @  200 mls/hr 1X PRN  PRN IV Hypotension;  Start 3/19/20 at

09:00;  Stop 3/19/20 at 14:59;  Status DC


Diphenhydramine HCl (Benadryl) 25 mg 1X PRN  PRN IV ITCHING;  Start 3/19/20 at 

09:00;  Stop 3/20/20 at 08:59;  Status DC


Diphenhydramine HCl (Benadryl) 25 mg 1X PRN  PRN IV ITCHING;  Start 3/19/20 at 

09:00;  Stop 3/20/20 at 08:59;  Status DC


Sodium Chloride 1,000 ml @  400 mls/hr Q2H30M PRN IV PATENCY;  Start 3/19/20 at 

08:56;  Stop 3/19/20 at 20:55;  Status DC


Info (PHARMACY MONITORING -- do not chart) 1 each PRN DAILY  PRN MC SEE 

COMMENTS;  Start 3/19/20 at 09:00;  Status UNV


Info (PHARMACY MONITORING -- do not chart) 1 each PRN DAILY  PRN MC SEE 

COMMENTS;  Start 3/19/20 at 09:00;  Stop 3/20/20 at 08:13;  Status DC


Digoxin (Lanoxin) 500 mcg 1X  ONCE IV  Last administered on 3/19/20at 10:04;  

Start 3/19/20 at 10:00;  Stop 3/19/20 at 10:01;  Status DC


Digoxin (Lanoxin) 125 mcg 1X  ONCE IV  Last administered on 3/19/20at 17:10;  

Start 3/19/20 at 18:00;  Stop 3/19/20 at 18:01;  Status DC


Magnesium Sulfate 100 ml @  25 mls/hr 1X  ONCE IV  Last administered on 

3/19/20at 12:48;  Start 3/19/20 at 13:00;  Stop 3/19/20 at 16:59;  Status DC


Sodium Chloride 90 meq/Magnesium Sulfate 10 meq/ Calcium Gluconate 20 meq/ 

Multivitamins 10 ml/Chromium/ Copper/Manganese/ Seleni/Zn 0.5 ml/ Total 

Parenteral Nutrition/Amino Acids/Dextrose/ Fat Emulsion Intravenous 1,512 ml @  

63 mls/hr TPN  CONT IV  Last administered on 3/19/20at 22:25;  Start 3/19/20 at 

22:00;  Stop 3/20/20 at 21:59;  Status DC


Sodium Chloride 1,000 ml @  1,000 mls/hr Q1H PRN IV hypotension;  Start 3/20/20 

at 08:05;  Stop 3/20/20 at 14:04;  Status DC


Albumin Human 200 ml @  200 mls/hr 1X  ONCE IV  Last administered on 3/20/20at 

08:57;  Start 3/20/20 at 08:15;  Stop 3/20/20 at 09:14;  Status DC


Diphenhydramine HCl (Benadryl) 25 mg 1X PRN  PRN IV ITCHING;  Start 3/20/20 at 

08:15;  Stop 3/21/20 at 08:14;  Status DC


Diphenhydramine HCl (Benadryl) 25 mg 1X PRN  PRN IV ITCHING;  Start 3/20/20 at 

08:15;  Stop 3/21/20 at 08:14;  Status DC


Sodium Chloride 1,000 ml @  400 mls/hr Q2H30M PRN IV PATENCY;  Start 3/20/20 at 

08:05;  Stop 3/20/20 at 20:04;  Status DC


Info (PHARMACY MONITORING -- do not chart) 1 each PRN DAILY  PRN MC SEE 

COMMENTS;  Start 3/20/20 at 08:15;  Stop 3/24/20 at 07:57;  Status DC


Sodium Chloride 90 meq/Potassium Chloride 15 meq/ Potassium Phosphate 10 mmol/ 

Magnesium Sulfate 10 meq/Calcium Gluconate 20 meq/ Multivitamins 10 ml/Chromium/

Copper/Manganese/ Seleni/Zn 0.5 ml/ Total Parenteral Nutrition/Amino 

Acids/Dextrose/ Fat Emulsion Intravenous 1,512 ml @  63 mls/hr TPN  CONT IV  

Last administered on 3/20/20at 21:01;  Start 3/20/20 at 22:00;  Stop 3/21/20 at 

21:59;  Status DC


Potassium Chloride/Water 100 ml @  100 mls/hr 1X  ONCE IV  Last administered on 

3/20/20at 14:09;  Start 3/20/20 at 14:00;  Stop 3/20/20 at 14:59;  Status DC


Benzocaine (Hurricaine One) 1 spray 1X  ONCE MM  Last administered on 3/20/20at 

16:38;  Start 3/20/20 at 14:30;  Stop 3/20/20 at 14:31;  Status DC


Lidocaine HCl (Glydo (Lidocaine) Jelly) 1 thomas 1X  ONCE MM  Last administered on 

3/20/20at 16:38;  Start 3/20/20 at 14:30;  Stop 3/20/20 at 14:31;  Status DC


Linezolid/Dextrose 300 ml @  300 mls/hr Q12HR IV  Last administered on 3/26/20at

21:04;  Start 3/20/20 at 20:00;  Stop 3/27/20 at 07:50;  Status DC


Acetaminophen (Tylenol) 650 mg PRN Q6HRS  PRN PO MILD PAIN / TEMP;  Start 

3/21/20 at 03:30;  Stop 3/21/20 at 03:36;  Status DC


Acetaminophen (Tylenol) 650 mg PRN Q6HRS  PRN PEG MILD PAIN / TEMP Last 

administered on 4/16/20at 19:56;  Start 3/21/20 at 03:36;  Stop 5/13/20 at 

10:25;  Status DC


Sodium Chloride 1,000 ml @  1,000 mls/hr Q1H PRN IV hypotension;  Start 3/21/20 

at 07:50;  Stop 3/21/20 at 13:49;  Status DC


Albumin Human 200 ml @  200 mls/hr 1X PRN  PRN IV Hypotension;  Start 3/21/20 at

08:00;  Stop 3/21/20 at 13:59;  Status DC


Sodium Chloride (Normal Saline Flush) 10 ml 1X PRN  PRN IV AP catheter pack;  

Start 3/21/20 at 08:00;  Stop 3/22/20 at 07:59;  Status DC


Sodium Chloride (Normal Saline Flush) 10 ml 1X PRN  PRN IV  catheter pack;  

Start 3/21/20 at 08:00;  Stop 3/22/20 at 07:59;  Status DC


Sodium Chloride 1,000 ml @  400 mls/hr Q2H30M PRN IV PATENCY;  Start 3/21/20 at 

07:50;  Stop 3/21/20 at 19:49;  Status DC


Info (PHARMACY MONITORING -- do not chart) 1 each PRN DAILY  PRN MC SEE 

COMMENTS;  Start 3/21/20 at 08:00;  Status UNV


Info (PHARMACY MONITORING -- do not chart) 1 each PRN DAILY  PRN MC SEE 

COMMENTS;  Start 3/21/20 at 08:00;  Stop 3/23/20 at 08:25;  Status DC


Sodium Chloride 90 meq/Potassium Chloride 15 meq/ Potassium Phosphate 10 mmol/ 

Magnesium Sulfate 10 meq/Calcium Gluconate 20 meq/ Multivitamins 10 ml/Chromium/

Copper/Manganese/ Seleni/Zn 0.5 ml/ Total Parenteral Nutrition/Amino 

Acids/Dextrose/ Fat Emulsion Intravenous 1,512 ml @  63 mls/hr TPN  CONT IV  

Last administered on 3/21/20at 20:57;  Start 3/21/20 at 22:00;  Stop 3/22/20 at 

21:59;  Status DC


Sodium Chloride 90 meq/Potassium Chloride 15 meq/ Potassium Phosphate 15 mmol/ 

Magnesium Sulfate 10 meq/Calcium Gluconate 20 meq/ Multivitamins 10 ml/Chromium/

Copper/Manganese/ Seleni/Zn 0.5 ml/ Total Parenteral Nutrition/Amino Ac

ids/Dextrose/ Fat Emulsion Intravenous 1,512 ml @  63 mls/hr TPN  CONT IV ;  

Start 3/22/20 at 22:00;  Stop 3/22/20 at 14:16;  Status DC


Sodium Chloride 90 meq/Potassium Chloride 15 meq/ Potassium Phosphate 15 mmol/ 

Magnesium Sulfate 10 meq/Calcium Gluconate 20 meq/ Multivitamins 10 ml/Chromium/

Copper/Manganese/ Seleni/Zn 0.5 ml/ Total Parenteral Nutrition/Amino 

Acids/Dextrose/ Fat Emulsion Intravenous 1,200 ml @  50 mls/hr TPN  CONT IV ;  

Start 3/22/20 at 22:00;  Stop 3/22/20 at 14:17;  Status DC


Sodium Chloride 90 meq/Potassium Chloride 15 meq/ Potassium Phosphate 10 mmol/ 

Magnesium Sulfate 10 meq/Calcium Gluconate 20 meq/ Multivitamins 10 ml/Chromium/

Copper/Manganese/ Seleni/Zn 0.5 ml/ Total Parenteral Nutrition/Amino 

Acids/Dextrose/ Fat Emulsion Intravenous 1,200 ml @  50 mls/hr TPN  CONT IV  

Last administered on 3/22/20at 23:29;  Start 3/22/20 at 22:00;  Stop 3/23/20 at 

21:59;  Status DC


Sodium Chloride 1,000 ml @  1,000 mls/hr Q1H PRN IV hypotension;  Start 3/23/20 

at 07:28;  Stop 3/23/20 at 13:27;  Status DC


Albumin Human 200 ml @  200 mls/hr 1X  ONCE IV  Last administered on 3/23/20at 

08:51;  Start 3/23/20 at 07:30;  Stop 3/23/20 at 08:29;  Status DC


Diphenhydramine HCl (Benadryl) 25 mg 1X PRN  PRN IV ITCHING;  Start 3/23/20 at 

07:30;  Stop 3/24/20 at 07:29;  Status DC


Diphenhydramine HCl (Benadryl) 25 mg 1X PRN  PRN IV ITCHING;  Start 3/23/20 at 

07:30;  Stop 3/24/20 at 07:29;  Status DC


Sodium Chloride 1,000 ml @  400 mls/hr Q2H30M PRN IV PATENCY;  Start 3/23/20 at 

07:28;  Stop 3/23/20 at 19:27;  Status DC


Info (PHARMACY MONITORING -- do not chart) 1 each PRN DAILY  PRN MC SEE 

COMMENTS;  Start 3/23/20 at 07:30;  Stop 4/3/20 at 13:01;  Status DC


Metronidazole 100 ml @  100 mls/hr Q6HRS IV  Last administered on 4/8/20at 

06:26;  Start 3/23/20 at 08:30;  Stop 4/8/20 at 09:58;  Status DC


Micafungin Sodium 100 mg/Dextrose 100 ml @  100 mls/hr Q24H IV  Last 

administered on 4/30/20at 08:18;  Start 3/23/20 at 09:00;  Stop 4/30/20 at 

20:58;  Status DC


Propofol 0 ml @ As Directed STK-MED ONCE IV ;  Start 3/23/20 at 07:53;  Stop 

3/23/20 at 07:53;  Status DC


Etomidate (Amidate) 20 mg STK-MED ONCE IV ;  Start 3/23/20 at 07:53;  Stop 

3/23/20 at 07:54;  Status DC


Midazolam HCl (Versed) 5 mg STK-MED ONCE .ROUTE ;  Start 3/23/20 at 07:57;  Stop

3/23/20 at 07:57;  Status DC


Fentanyl Citrate 30 ml @ 0 mls/hr CONT  PRN IV SEE PROTOCOL Last administered on

4/17/20at 06:12;  Start 3/23/20 at 08:15;  Stop 4/17/20 at 09:19;  Status DC


Artificial Tears (Artificial Tears) 1 drop PRN Q1HR  PRN OU DRY EYE, 1st choice;

 Start 3/23/20 at 08:15;  Stop 4/29/20 at 05:31;  Status DC


Midazolam HCl 50 mg/Sodium Chloride 50 ml @ 0 mls/hr CONT  PRN IV SEE PROTOCOL 

Last administered on 3/26/20at 22:39;  Start 3/23/20 at 08:15;  Stop 3/28/20 at 

15:59;  Status DC


Etomidate (Amidate) 8 mg 1X  ONCE IV  Last administered on 3/23/20at 08:33;  

Start 3/23/20 at 08:30;  Stop 3/23/20 at 08:31;  Status DC


Succinylcholine Chloride (Anectine) 120 mg 1X  ONCE IV  Last administered on 

3/23/20at 08:34;  Start 3/23/20 at 08:30;  Stop 3/23/20 at 08:31;  Status DC


Midazolam HCl (Versed) 5 mg 1X  ONCE IV ;  Start 3/23/20 at 08:30;  Stop 3/23/20

at 08:31;  Status DC


Potassium Chloride 15 meq/ Bicarbonate Dialysis Soln w/ out KCl 5,007.5 ml  @ 

1,000 mls/ hr Q5H1M IV  Last administered on 3/24/20at 11:11;  Start 3/23/20 at 

12:00;  Stop 3/24/20 at 11:15;  Status DC


Potassium Chloride 15 meq/ Bicarbonate Dialysis Soln w/ out KCl 5,007.5 ml  @ 

1,000 mls/ hr Q5H1M IV  Last administered on 3/24/20at 11:12;  Start 3/23/20 at 

12:00;  Stop 3/24/20 at 11:17;  Status DC


Potassium Chloride 15 meq/ Bicarbonate Dialysis Soln w/ out KCl 5,007.5 ml  @ 

1,000 mls/ hr Q5H1M IV  Last administered on 3/24/20at 11:11;  Start 3/23/20 at 

12:00;  Stop 3/24/20 at 11:19;  Status DC


Sodium Chloride 90 meq/Potassium Chloride 15 meq/ Potassium Phosphate 10 mmol/ 

Magnesium Sulfate 10 meq/Calcium Gluconate 20 meq/ Multivitamins 10 ml/Chromium/

Copper/Manganese/ Seleni/Zn 0.5 ml/ Total Parenteral Nutrition/Amino 

Acids/Dextrose/ Fat Emulsion Intravenous 1,400 ml @  58.333 mls/ hr TPN  CONT IV

 Last administered on 3/23/20at 21:42;  Start 3/23/20 at 22:00;  Stop 3/24/20 at

21:59;  Status DC


Heparin Sodium (Porcine) (Heparin Sodium) 5,000 unit Q8HRS SQ  Last administered

on 3/28/20at 05:55;  Start 3/23/20 at 15:00;  Stop 3/28/20 at 13:28;  Status DC


Meropenem 500 mg/ Sodium Chloride 50 ml @  100 mls/hr Q6HRS IV  Last 

administered on 3/25/20at 06:00;  Start 3/24/20 at 09:00;  Stop 3/25/20 at 

07:29;  Status DC


Potassium Phosphate 20 mmol/ Sodium Chloride 106.6667 ml @  51.667 m... 1X  ONCE

IV  Last administered on 3/24/20at 11:22;  Start 3/24/20 at 10:15;  Stop 3/24/20

at 12:18;  Status DC


Acetaminophen (Tylenol Supp) 650 mg PRN Q6HRS  PRN OH MILD PAIN / TEMP > 100.3'F

Last administered on 7/15/20at 19:52;  Start 3/24/20 at 10:30


Potassium Chloride/Water 100 ml @  100 mls/hr Q1H IV  Last administered on 

3/24/20at 12:12;  Start 3/24/20 at 11:00;  Stop 3/24/20 at 12:59;  Status DC


Potassium Chloride 20 meq/ Bicarbonate Dialysis Soln w/ out KCl 5,010 ml @  

1,000 mls/hr Q5H1M IV  Last administered on 3/25/20at 08:48;  Start 3/24/20 at 

12:00;  Stop 3/25/20 at 13:03;  Status DC


Potassium Chloride 20 meq/ Bicarbonate Dialysis Soln w/ out KCl 5,010 ml @  

1,000 mls/hr Q5H1M IV  Last administered on 3/29/20at 14:52;  Start 3/24/20 at 

11:30;  Stop 3/29/20 at 19:59;  Status DC


Potassium Chloride 20 meq/ Bicarbonate Dialysis Soln w/ out KCl 5,010 ml @  

1,000 mls/hr Q5H1M IV  Last administered on 3/29/20at 14:53;  Start 3/24/20 at 

11:30;  Stop 3/29/20 at 19:59;  Status DC


Sodium Chloride 90 meq/Potassium Chloride 15 meq/ Potassium Phosphate 15 mmol/ 

Magnesium Sulfate 10 meq/Calcium Gluconate 15 meq/ Multivitamins 10 ml/Chromium/

Copper/Manganese/ Seleni/Zn 0.5 ml/ Total Parenteral Nutrition/Amino 

Acids/Dextrose/ Fat Emulsion Intravenous 1,400 ml @  58.333 mls/ hr TPN  CONT IV

 Last administered on 3/24/20at 22:17;  Start 3/24/20 at 22:00;  Stop 3/25/20 at

21:59;  Status DC


Cefepime HCl (Maxipime) 2 gm Q12HR IVP  Last administered on 4/7/20at 20:56;  

Start 3/25/20 at 09:00;  Stop 4/8/20 at 09:58;  Status DC


Daptomycin 500 mg/ Sodium Chloride 50 ml @  100 mls/hr Q48H IV  Last 

administered on 4/10/20at 09:57;  Start 3/25/20 at 08:30;  Stop 4/10/20 at 

10:07;  Status DC


Lidocaine HCl (Buffered Lidocaine 1%) 3 ml 1X  ONCE INJ  Last administered on 

3/25/20at 10:27;  Start 3/25/20 at 10:30;  Stop 3/25/20 at 10:31;  Status DC


Potassium Phosphate 20 mmol/ Sodium Chloride 106.6667 ml @  51.667 m... 1X  ONCE

IV  Last administered on 3/25/20at 12:51;  Start 3/25/20 at 13:00;  Stop 3/25/20

at 15:03;  Status DC


Sodium Chloride 90 meq/Potassium Chloride 15 meq/ Potassium Phosphate 18 mmol/ 

Magnesium Sulfate 8 meq/Calcium Gluconate 15 meq/ Multivitamins 10 ml/Chromium/ 

Copper/Manganese/ Seleni/Zn 0.5 ml/ Total Parenteral Nutrition/Amino 

Acids/Dextrose/ Fat Emulsion Intravenous 1,400 ml @  58.333 mls/ hr TPN  CONT IV

 Last administered on 3/25/20at 22:16;  Start 3/25/20 at 22:00;  Stop 3/26/20 at

21:59;  Status DC


Potassium Chloride 20 meq/ Bicarbonate Dialysis Soln w/ out KCl 5,010 ml @  

1,000 mls/hr Q5H1M IV  Last administered on 3/29/20at 14:54;  Start 3/25/20 at 

16:00;  Stop 3/29/20 at 19:59;  Status DC


Multi-Ingred Cream/Lotion/Oil/ Oint (Artificial Tears Eye Ointment) 1 thomas PRN 

Q1HR  PRN OU DRY EYE, 2nd choice Last administered on 4/13/20at 08:19;  Start 

3/25/20 at 17:30;  Stop 6/3/20 at 14:39;  Status DC


Sodium Chloride 90 meq/Potassium Chloride 15 meq/ Potassium Phosphate 18 mmol/ 

Magnesium Sulfate 8 meq/Calcium Gluconate 15 meq/ Multivitamins 10 ml/Chromium/ 

Copper/Manganese/ Seleni/Zn 0.5 ml/ Total Parenteral Nutrition/Amino 

Acids/Dextrose/ Fat Emulsion Intravenous 1,400 ml @  58.333 mls/ hr TPN  CONT IV

 Last administered on 3/26/20at 22:00;  Start 3/26/20 at 22:00;  Stop 3/27/20 at

21:59;  Status DC


Albumin Human 500 ml @  125 mls/hr 1X  ONCE IV ;  Start 3/26/20 at 14:15;  Stop 

3/26/20 at 18:14;  Status DC


Sodium Chloride 90 meq/Potassium Chloride 15 meq/ Potassium Phosphate 18 mmol/ 

Magnesium Sulfate 8 meq/Calcium Gluconate 15 meq/ Multivitamins 10 ml/Chromium/ 

Copper/Manganese/ Seleni/Zn 0.5 ml/ Insulin Human Regular 10 unit/ Total 

Parenteral Nutrition/Amino Acids/Dextrose/ Fat Emulsion Intravenous 1,400 ml @  

58.333 mls/ hr TPN  CONT IV  Last administered on 3/27/20at 21:43;  Start 

3/27/20 at 22:00;  Stop 3/28/20 at 21:59;  Status DC


Lidocaine HCl (Buffered Lidocaine 1%) 3 ml STK-MED ONCE .ROUTE ;  Start 3/25/20 

at 10:00;  Stop 3/27/20 at 13:57;  Status DC


Midazolam HCl 100 mg/Sodium Chloride 100 ml @ 7 mls/hr CONT  PRN IV SEE PROTOCOL

Last administered on 4/8/20at 15:35;  Start 3/28/20 at 16:00;  Stop 6/3/20 at 

14:38;  Status DC


Sodium Chloride 90 meq/Potassium Chloride 15 meq/ Potassium Phosphate 18 mmol/ 

Magnesium Sulfate 8 meq/Calcium Gluconate 15 meq/ Multivitamins 10 ml/Chromium/ 

Copper/Manganese/ Seleni/Zn 0.5 ml/ Insulin Human Regular 15 unit/ Total 

Parenteral Nutrition/Amino Acids/Dextrose/ Fat Emulsion Intravenous 1,400 ml @  

58.333 mls/ hr TPN  CONT IV  Last administered on 3/28/20at 20:34;  Start 

3/28/20 at 22:00;  Stop 3/29/20 at 21:59;  Status DC


Info ([facility] Electrolyte Protocol) 1 ea CONT PRN  PRN MC PER PROTOCOL;  Start 

3/29/20 at 13:15


Sodium Chloride 90 meq/Potassium Chloride 15 meq/ Potassium Phosphate 18 mmol/ 

Magnesium Sulfate 8 meq/Calcium Gluconate 15 meq/ Multivitamins 10 ml/Chromium/ 

Copper/Manganese/ Seleni/Zn 0.5 ml/ Insulin Human Regular 15 unit/ Total 

Parenteral Nutrition/Amino Acids/Dextrose/ Fat Emulsion Intravenous 1,400 ml @  

58.333 mls/ hr TPN  CONT IV  Last administered on 3/29/20at 22:05;  Start 

3/29/20 at 22:00;  Stop 3/30/20 at 21:59;  Status DC


Potassium Chloride 15 meq/ Bicarbonate Dialysis Soln w/ out KCl 5,007.5 ml  @ 

1,000 mls/ hr Q5H1M IV  Last administered on 4/1/20at 18:14;  Start 3/29/20 at 

20:00;  Stop 4/2/20 at 13:08;  Status DC


Potassium Chloride 15 meq/ Bicarbonate Dialysis Soln w/ out KCl 5,007.5 ml  @ 

1,000 mls/ hr Q5H1M IV  Last administered on 4/1/20at 18:14;  Start 3/29/20 at 

20:00;  Stop 4/2/20 at 13:08;  Status DC


Potassium Chloride 15 meq/ Bicarbonate Dialysis Soln w/ out KCl 5,007.5 ml  @ 

1,000 mls/ hr Q5H1M IV  Last administered on 4/1/20at 18:14;  Start 3/29/20 at 

20:00;  Stop 4/2/20 at 13:08;  Status DC


Iohexol (Omnipaque 240 Mg/ml) 30 ml 1X  ONCE PO  Last administered on 3/30/20at 

11:30;  Start 3/30/20 at 11:30;  Stop 3/30/20 at 11:33;  Status DC


Info (CONTRAST GIVEN -- Rx MONITORING) 1 each PRN DAILY  PRN MC SEE COMMENTS;  

Start 3/30/20 at 11:45;  Stop 4/1/20 at 11:44;  Status DC


Sodium Chloride 90 meq/Potassium Chloride 15 meq/ Potassium Phosphate 18 mmol/ 

Magnesium Sulfate 8 meq/Calcium Gluconate 15 meq/ Multivitamins 10 ml/Chromium/ 

Copper/Manganese/ Seleni/Zn 0.5 ml/ Insulin Human Regular 15 unit/ Total 

Parenteral Nutrition/Amino Acids/Dextrose/ Fat Emulsion Intravenous 1,400 ml @  

58.333 mls/ hr TPN  CONT IV  Last administered on 3/30/20at 21:47;  Start 

3/30/20 at 22:00;  Stop 3/31/20 at 21:59;  Status DC


Sodium Chloride 90 meq/Potassium Chloride 15 meq/ Potassium Phosphate 18 mmol/ 

Magnesium Sulfate 8 meq/Calcium Gluconate 15 meq/ Multivitamins 10 ml/Chromium/ 

Copper/Manganese/ Seleni/Zn 0.5 ml/ Insulin Human Regular 20 unit/ Total 

Parenteral Nutrition/Amino Acids/Dextrose/ Fat Emulsion Intravenous 1,400 ml @  

58.333 mls/ hr TPN  CONT IV  Last administered on 3/31/20at 21:36;  Start 

3/31/20 at 22:00;  Stop 4/1/20 at 21:59;  Status DC


Alteplase, Recombinant (Cathflo For Central Catheter Clearance) 1 mg 1X  ONCE 

INT CAT  Last administered on 3/31/20at 20:03;  Start 3/31/20 at 19:30;  Stop 

3/31/20 at 19:46;  Status DC


Alteplase, Recombinant (Cathflo For Central Catheter Clearance) 1 mg 1X  ONCE 

INT CAT  Last administered on 3/31/20at 22:05;  Start 3/31/20 at 22:00;  Stop 

3/31/20 at 22:01;  Status DC


Sodium Chloride 90 meq/Potassium Chloride 15 meq/ Potassium Phosphate 18 mmol/ 

Magnesium Sulfate 8 meq/Calcium Gluconate 15 meq/ Multivitamins 10 ml/Chromium/ 

Copper/Manganese/ Seleni/Zn 0.5 ml/ Insulin Human Regular 20 unit/ Total 

Parenteral Nutrition/Amino Acids/Dextrose/ Fat Emulsion Intravenous 1,400 ml @  

58.333 mls/ hr TPN  CONT IV  Last administered on 4/1/20at 21:30;  Start 4/1/20 

at 22:00;  Stop 4/2/20 at 21:59;  Status DC


Dexmedetomidine HCl 400 mcg/ Sodium Chloride 100 ml @ 0 mls/hr CONT  PRN IV 

ANXIETY / AGITATION Last administered on 5/30/20at 12:57;  Start 4/2/20 at 

08:15;  Stop 5/30/20 at 18:31;  Status DC


Sodium Chloride 500 ml @  500 mls/hr 1X PRN  PRN IV ELEVATED BP, SEE COMMENTS;  

Start 4/2/20 at 08:15


Atropine Sulfate (ATROPINE 0.5mg SYRINGE) 0.5 mg PRN Q5MIN  PRN IV SEE COMMENTS;

 Start 4/2/20 at 08:15


Furosemide (Lasix) 20 mg 1X  ONCE IVP  Last administered on 4/2/20at 08:19;  

Start 4/2/20 at 08:15;  Stop 4/2/20 at 08:16;  Status DC


Lidocaine HCl (Buffered Lidocaine 1%) 3 ml STK-MED ONCE .ROUTE ;  Start 4/2/20 

at 08:39;  Stop 4/2/20 at 08:39;  Status DC


Lidocaine HCl (Buffered Lidocaine 1%) 6 ml 1X  ONCE INJ  Last administered on 

4/2/20at 09:05;  Start 4/2/20 at 09:00;  Stop 4/2/20 at 09:06;  Status DC


Sodium Chloride 90 meq/Potassium Chloride 15 meq/ Potassium Phosphate 18 mmol/ 

Magnesium Sulfate 8 meq/Calcium Gluconate 15 meq/ Multivitamins 10 ml/Chromium/ 

Copper/Manganese/ Seleni/Zn 0.5 ml/ Insulin Human Regular 20 unit/ Total 

Parenteral Nutrition/Amino Acids/Dextrose/ Fat Emulsion Intravenous 1,400 ml @  

58.333 mls/ hr TPN  CONT IV  Last administered on 4/2/20at 22:45;  Start 4/2/20 

at 22:00;  Stop 4/3/20 at 21:59;  Status DC


Sodium Chloride 1,000 ml @  1,000 mls/hr Q1H PRN IV hypotension;  Start 4/3/20 

at 07:30;  Stop 4/3/20 at 13:29;  Status DC


Albumin Human 200 ml @  200 mls/hr 1X PRN  PRN IV Hypotension Last administered 

on 4/3/20at 09:36;  Start 4/3/20 at 07:30;  Stop 4/3/20 at 13:29;  Status DC


Sodium Chloride (Normal Saline Flush) 10 ml 1X PRN  PRN IV AP catheter pack;  

Start 4/3/20 at 07:30;  Stop 4/3/20 at 21:29;  Status DC


Sodium Chloride (Normal Saline Flush) 10 ml 1X PRN  PRN IV  catheter pack;  

Start 4/3/20 at 07:30;  Stop 4/4/20 at 07:29;  Status DC


Sodium Chloride 1,000 ml @  400 mls/hr Q2H30M PRN IV PATENCY;  Start 4/3/20 at 

07:30;  Stop 4/3/20 at 19:29;  Status DC


Info (PHARMACY MONITORING -- do not chart) 1 each PRN DAILY  PRN MC SEE 

COMMENTS;  Start 4/3/20 at 07:30;  Stop 4/3/20 at 13:02;  Status DC


Info (PHARMACY MONITORING -- do not chart) 1 each PRN DAILY  PRN MC SEE 

COMMENTS;  Start 4/3/20 at 07:30;  Stop 4/5/20 at 12:45;  Status DC


Sodium Chloride 90 meq/Potassium Chloride 15 meq/ Potassium Phosphate 10 mmol/ 

Magnesium Sulfate 8 meq/Calcium Gluconate 15 meq/ Multivitamins 10 ml/Chromium/ 

Copper/Manganese/ Seleni/Zn 0.5 ml/ Insulin Human Regular 25 unit/ Total 

Parenteral Nutrition/Amino Acids/Dextrose/ Fat Emulsion Intravenous 1,400 ml @  

58.333 mls/ hr TPN  CONT IV  Last administered on 4/3/20at 22:19;  Start 4/3/20 

at 22:00;  Stop 4/4/20 at 21:59;  Status DC


Heparin Sodium (Porcine) (Heparin Sodium) 5,000 unit Q12HR SQ  Last administered

on 4/26/20at 08:59;  Start 4/3/20 at 21:00;  Stop 4/26/20 at 10:05;  Status DC


Ondansetron HCl (Zofran) 4 mg PRN Q6HRS  PRN IV NAUSEA/VOMITING;  Start 4/6/20 

at 07:00;  Stop 4/7/20 at 06:59;  Status DC


Fentanyl Citrate (Fentanyl 2ml Vial) 25 mcg PRN Q5MIN  PRN IV MILD PAIN 1-3;  

Start 4/6/20 at 07:00;  Stop 4/7/20 at 06:59;  Status DC


Fentanyl Citrate (Fentanyl 2ml Vial) 50 mcg PRN Q5MIN  PRN IV MODERATE TO SEVERE

PAIN;  Start 4/6/20 at 07:00;  Stop 4/7/20 at 06:59;  Status DC


Ringer's Solution 1,000 ml @  30 mls/hr Q24H IV ;  Start 4/6/20 at 07:00;  Stop 

4/6/20 at 18:59;  Status DC


Lidocaine HCl (Xylocaine-Mpf 1% 2ml Vial) 2 ml PRN 1X  PRN ID PRIOR TO IV START;

 Start 4/6/20 at 07:00;  Stop 4/7/20 at 06:59;  Status DC


Prochlorperazine Edisylate (Compazine) 5 mg PACU PRN  PRN IV NAUSEA, MRX1;  

Start 4/6/20 at 07:00;  Stop 4/7/20 at 06:59;  Status DC


Sodium Chloride 1,000 ml @  1,000 mls/hr Q1H PRN IV hypotension;  Start 4/4/20 

at 09:10;  Stop 4/4/20 at 15:09;  Status DC


Albumin Human 200 ml @  200 mls/hr 1X PRN  PRN IV Hypotension Last administered 

on 4/4/20at 10:10;  Start 4/4/20 at 09:15;  Stop 4/4/20 at 15:14;  Status DC


Sodium Chloride 1,000 ml @  400 mls/hr Q2H30M PRN IV PATENCY;  Start 4/4/20 at 

09:10;  Stop 4/4/20 at 21:09;  Status DC


Info (PHARMACY MONITORING -- do not chart) 1 each PRN DAILY  PRN MC SEE 

COMMENTS;  Start 4/4/20 at 09:15;  Stop 4/5/20 at 12:45;  Status DC


Info (PHARMACY MONITORING -- do not chart) 1 each PRN DAILY  PRN MC SEE 

COMMENTS;  Start 4/4/20 at 09:15;  Stop 4/5/20 at 12:45;  Status DC


Sodium Chloride 90 meq/Potassium Chloride 15 meq/ Potassium Phosphate 10 mmol/ 

Magnesium Sulfate 8 meq/Calcium Gluconate 15 meq/ Multivitamins 10 ml/Chromium/ 

Copper/Manganese/ Seleni/Zn 0.5 ml/ Insulin Human Regular 25 unit/ Total 

Parenteral Nutrition/Amino Acids/Dextrose/ Fat Emulsion Intravenous 1,400 ml @  

58.333 mls/ hr TPN  CONT IV  Last administered on 4/4/20at 22:10;  Start 4/4/20 

at 22:00;  Stop 4/5/20 at 21:59;  Status DC


Magnesium Sulfate 50 ml @ 25 mls/hr PRN DAILY  PRN IV for Mag < 1.7 on am labs 

Last administered on 6/18/20at 10:57;  Start 4/5/20 at 09:15


Sodium Chloride 90 meq/Potassium Chloride 15 meq/ Potassium Phosphate 10 mmol/ 

Magnesium Sulfate 8 meq/Calcium Gluconate 15 meq/ Multivitamins 10 ml/Chromium/ 

Copper/Manganese/ Seleni/Zn 0.5 ml/ Insulin Human Regular 25 unit/ Total 

Parenteral Nutrition/Amino Acids/Dextrose/ Fat Emulsion Intravenous 1,400 ml @  

58.333 mls/ hr TPN  CONT IV  Last administered on 4/5/20at 21:20;  Start 4/5/20 

at 22:00;  Stop 4/6/20 at 21:59;  Status DC


Sodium Chloride 1,000 ml @  1,000 mls/hr Q1H PRN IV hypotension;  Start 4/5/20 

at 12:23;  Stop 4/5/20 at 18:22;  Status DC


Albumin Human 200 ml @  200 mls/hr 1X  ONCE IV  Last administered on 4/5/20at 

13:34;  Start 4/5/20 at 12:30;  Stop 4/5/20 at 13:29;  Status DC


Diphenhydramine HCl (Benadryl) 25 mg 1X PRN  PRN IV ITCHING;  Start 4/5/20 at 

12:30;  Stop 4/6/20 at 12:29;  Status DC


Diphenhydramine HCl (Benadryl) 25 mg 1X PRN  PRN IV ITCHING;  Start 4/5/20 at 

12:30;  Stop 4/6/20 at 12:29;  Status DC


Info (PHARMACY MONITORING -- do not chart) 1 each PRN DAILY  PRN MC SEE 

COMMENTS;  Start 4/5/20 at 12:30;  Status Cancel


Bupivacaine HCl/ Epinephrine Bitart (Sensorcain-Epi 0.5%-1:701516 Mpf) 30 ml 

STK-MED ONCE .ROUTE  Last administered on 4/6/20at 11:44;  Start 4/6/20 at 

11:00;  Stop 4/6/20 at 11:01;  Status DC


Cellulose (Surgicel Fibrillar 1x2) 1 each STK-MED ONCE .ROUTE ;  Start 4/6/20 at

11:00;  Stop 4/6/20 at 11:01;  Status DC


Sodium Chloride 90 meq/Potassium Chloride 15 meq/ Potassium Phosphate 10 mmol/ 

Magnesium Sulfate 12 meq/Calcium Gluconate 15 meq/ Multivitamins 10 ml/Chromium/

Copper/Manganese/ Seleni/Zn 0.5 ml/ Insulin Human Regular 25 unit/ Total 

Parenteral Nutrition/Amino Acids/Dextrose/ Fat Emulsion Intravenous 1,400 ml @  

58.333 mls/ hr TPN  CONT IV  Last administered on 4/6/20at 22:24;  Start 4/6/20 

at 22:00;  Stop 4/7/20 at 21:59;  Status DC


Propofol 20 ml @ As Directed STK-MED ONCE IV ;  Start 4/6/20 at 11:07;  Stop 4/6 /20 at 11:07;  Status DC


Cellulose (Surgicel Hemostat 4x8) 1 each STK-MED ONCE .ROUTE  Last administered 

on 4/6/20at 11:44;  Start 4/6/20 at 11:55;  Stop 4/6/20 at 11:56;  Status DC


Sevoflurane (Ultane) 60 ml STK-MED ONCE IH ;  Start 4/6/20 at 12:46;  Stop 

4/6/20 at 12:46;  Status DC


Sodium Chloride 1,000 ml @  1,000 mls/hr Q1H PRN IV hypotension;  Start 4/6/20 

at 13:51;  Stop 4/6/20 at 19:50;  Status DC


Albumin Human 200 ml @  200 mls/hr 1X PRN  PRN IV Hypotension Last administered 

on 4/6/20at 14:51;  Start 4/6/20 at 14:00;  Stop 4/6/20 at 19:59;  Status DC


Diphenhydramine HCl (Benadryl) 25 mg 1X PRN  PRN IV ITCHING;  Start 4/6/20 at 

14:00;  Stop 4/7/20 at 13:59;  Status DC


Diphenhydramine HCl (Benadryl) 25 mg 1X PRN  PRN IV ITCHING;  Start 4/6/20 at 

14:00;  Stop 4/7/20 at 13:59;  Status DC


Sodium Chloride 1,000 ml @  400 mls/hr Q2H30M PRN IV PATENCY;  Start 4/6/20 at 

13:51;  Stop 4/7/20 at 01:50;  Status DC


Info (PHARMACY MONITORING -- do not chart) 1 each PRN DAILY  PRN MC SEE 

COMMENTS;  Start 4/6/20 at 14:00;  Stop 4/9/20 at 08:16;  Status DC


Heparin Sodium (Porcine) (Hep Lock Adult) 500 unit STK-MED ONCE IVP ;  Start 

4/7/20 at 09:29;  Stop 4/7/20 at 09:30;  Status DC


Sodium Chloride 1,000 ml @  1,000 mls/hr Q1H PRN IV hypotension;  Start 4/7/20 

at 10:43;  Stop 4/7/20 at 16:42;  Status DC


Sodium Chloride 1,000 ml @  400 mls/hr Q2H30M PRN IV PATENCY;  Start 4/7/20 at 

10:43;  Stop 4/7/20 at 22:42;  Status DC


Info (PHARMACY MONITORING -- do not chart) 1 each PRN DAILY  PRN MC SEE 

COMMENTS;  Start 4/7/20 at 10:45;  Status UNV


Info (PHARMACY MONITORING -- do not chart) 1 each PRN DAILY  PRN MC SEE 

COMMENTS;  Start 4/7/20 at 10:45;  Status UNV


Sodium Chloride 90 meq/Potassium Chloride 15 meq/ Magnesium Sulfate 12 

meq/Calcium Gluconate 15 meq/ Multivitamins 10 ml/Chromium/ Copper/Manganese/ 

Seleni/Zn 0.5 ml/ Insulin Human Regular 25 unit/ Total Parenteral 

Nutrition/Amino Acids/Dextrose/ Fat Emulsion Intravenous 1,400 ml @  58.333 mls/

hr TPN  CONT IV  Last administered on 4/7/20at 22:13;  Start 4/7/20 at 22:00;  

Stop 4/8/20 at 21:59;  Status DC


Sodium Chloride 1,000 ml @  1,000 mls/hr Q1H PRN IV hypotension;  Start 4/8/20 

at 07:50;  Stop 4/8/20 at 13:49;  Status DC


Albumin Human 200 ml @  200 mls/hr 1X  ONCE IV ;  Start 4/8/20 at 08:00;  Stop 

4/8/20 at 08:53;  Status DC


Diphenhydramine HCl (Benadryl) 25 mg 1X PRN  PRN IV ITCHING;  Start 4/8/20 at 

08:00;  Stop 4/9/20 at 07:59;  Status DC


Diphenhydramine HCl (Benadryl) 25 mg 1X PRN  PRN IV ITCHING;  Start 4/8/20 at 

08:00;  Stop 4/9/20 at 07:59;  Status DC


Info (PHARMACY MONITORING -- do not chart) 1 each PRN DAILY  PRN MC SEE 

COMMENTS;  Start 4/8/20 at 08:00;  Stop 4/9/20 at 08:16;  Status DC


Albumin Human 50 ml @ 50 mls/hr 1X  ONCE IV ;  Start 4/8/20 at 08:53;  Stop 

4/8/20 at 08:56;  Status DC


Albumin Human 200 ml @  50 mls/hr PRN 1X  PRN IV HYPOTENSION Last administered 

on 4/14/20at 11:54;  Start 4/8/20 at 09:00;  Stop 5/21/20 at 11:14;  Status DC


Meropenem 500 mg/ Sodium Chloride 50 ml @  100 mls/hr Q12H IV  Last administered

on 4/28/20at 10:45;  Start 4/8/20 at 10:00;  Stop 4/28/20 at 12:37;  Status DC


Sodium Chloride 90 meq/Magnesium Sulfate 12 meq/ Calcium Gluconate 15 meq/ 

Multivitamins 10 ml/Chromium/ Copper/Manganese/ Seleni/Zn 0.5 ml/ Insulin Human 

Regular 25 unit/ Total Parenteral Nutrition/Amino Acids/Dextrose/ Fat Emulsion 

Intravenous 1,400 ml @  58.333 mls/ hr TPN  CONT IV  Last administered on 

4/8/20at 21:41;  Start 4/8/20 at 22:00;  Stop 4/9/20 at 21:59;  Status DC


Sodium Chloride 1,000 ml @  1,000 mls/hr Q1H PRN IV hypotension;  Start 4/9/20 

at 07:58;  Stop 4/9/20 at 13:57;  Status DC


Albumin Human 200 ml @  200 mls/hr 1X PRN  PRN IV Hypotension Last administered 

on 4/9/20at 09:30;  Start 4/9/20 at 08:00;  Stop 4/9/20 at 13:59;  Status DC


Sodium Chloride 1,000 ml @  400 mls/hr Q2H30M PRN IV PATENCY;  Start 4/9/20 at 

07:58;  Stop 4/9/20 at 19:57;  Status DC


Info (PHARMACY MONITORING -- do not chart) 1 each PRN DAILY  PRN MC SEE MIKEY

TS;  Start 4/9/20 at 08:00;  Status Cancel


Info (PHARMACY MONITORING -- do not chart) 1 each PRN DAILY  PRN MC SEE 

COMMENTS;  Start 4/9/20 at 08:15;  Status UNV


Sodium Chloride 90 meq/Potassium Phosphate 5 mmol/ Magnesium Sulfate 12 

meq/Calcium Gluconate 15 meq/ Multivitamins 10 ml/Chromium/ Copper/Manganese/ 

Seleni/Zn 0.5 ml/ Insulin Human Regular 30 unit/ Total Parenteral Nutriti

on/Amino Acids/Dextrose/ Fat Emulsion Intravenous 1,400 ml @  58.333 mls/ hr TPN

 CONT IV  Last administered on 4/9/20at 22:08;  Start 4/9/20 at 22:00;  Stop 

4/10/20 at 21:59;  Status DC


Linezolid/Dextrose 300 ml @  300 mls/hr Q12HR IV  Last administered on 4/20/20at

20:40;  Start 4/10/20 at 11:00;  Stop 4/21/20 at 08:10;  Status DC


Sodium Chloride 90 meq/Potassium Phosphate 15 mmol/ Magnesium Sulfate 12 

meq/Calcium Gluconate 15 meq/ Multivitamins 10 ml/Chromium/ Copper/Manganese/ 

Seleni/Zn 0.5 ml/ Insulin Human Regular 30 unit/ Total Parenteral 

Nutrition/Amino Acids/Dextrose/ Fat Emulsion Intravenous 1,400 ml @  58.333 mls/

hr TPN  CONT IV  Last administered on 4/10/20at 21:49;  Start 4/10/20 at 22:00; 

Stop 4/11/20 at 21:59;  Status DC


Sodium Chloride 90 meq/Potassium Phosphate 15 mmol/ Magnesium Sulfate 12 

meq/Calcium Gluconate 15 meq/ Multivitamins 10 ml/Chromium/ Copper/Manganese/ 

Seleni/Zn 0.5 ml/ Insulin Human Regular 40 unit/ Total Parenteral 

Nutrition/Amino Acids/Dextrose/ Fat Emulsion Intravenous 1,400 ml @  58.333 mls/

hr TPN  CONT IV  Last administered on 4/11/20at 21:21;  Start 4/11/20 at 22:00; 

Stop 4/12/20 at 21:59;  Status DC


Sodium Chloride 1,000 ml @  1,000 mls/hr Q1H PRN IV hypotension;  Start 4/11/20 

at 13:26;  Stop 4/11/20 at 19:25;  Status DC


Albumin Human 200 ml @  200 mls/hr 1X PRN  PRN IV Hypotension Last administered 

on 4/11/20at 15:00;  Start 4/11/20 at 13:30;  Stop 4/11/20 at 19:29;  Status DC


Sodium Chloride (Normal Saline Flush) 10 ml 1X PRN  PRN IV AP catheter pack;  

Start 4/11/20 at 13:30;  Stop 4/12/20 at 13:29;  Status DC


Sodium Chloride (Normal Saline Flush) 10 ml 1X PRN  PRN IV  catheter pack;  

Start 4/11/20 at 13:30;  Stop 4/12/20 at 13:29;  Status DC


Sodium Chloride 1,000 ml @  400 mls/hr Q2H30M PRN IV PATENCY;  Start 4/11/20 at 

13:26;  Stop 4/12/20 at 01:25;  Status DC


Info (PHARMACY MONITORING -- do not chart) 1 each PRN DAILY  PRN MC SEE MIKEY DISLA;  Start 4/11/20 at 13:30;  Stop 4/11/20 at 13:33;  Status DC


Info (PHARMACY MONITORING -- do not chart) 1 each PRN DAILY  PRN MC SEE 

COMMENTS;  Start 4/11/20 at 13:30;  Stop 4/11/20 at 13:34;  Status DC


Sodium Chloride 90 meq/Potassium Phosphate 19 mmol/ Magnesium Sulfate 12 

meq/Calcium Gluconate 15 meq/ Multivitamins 10 ml/Chromium/ Copper/Manganese/ 

Seleni/Zn 0.5 ml/ Insulin Human Regular 40 unit/ Total Parenteral 

Nutrition/Amino Acids/Dextrose/ Fat Emulsion Intravenous 1,400 ml @  58.333 mls/

hr TPN  CONT IV  Last administered on 4/12/20at 21:54;  Start 4/12/20 at 22:00; 

Stop 4/13/20 at 21:59;  Status DC


Sodium Chloride 1,000 ml @  1,000 mls/hr Q1H PRN IV hypotension;  Start 4/13/20 

at 09:35;  Stop 4/13/20 at 15:34;  Status DC


Albumin Human 200 ml @  200 mls/hr 1X PRN  PRN IV Hypotension;  Start 4/13/20 at

09:45;  Stop 4/13/20 at 15:44;  Status DC


Diphenhydramine HCl (Benadryl) 25 mg 1X PRN  PRN IV ITCHING;  Start 4/13/20 at 

09:45;  Stop 4/14/20 at 09:44;  Status DC


Diphenhydramine HCl (Benadryl) 25 mg 1X PRN  PRN IV ITCHING;  Start 4/13/20 at 

09:45;  Stop 4/14/20 at 09:44;  Status DC


Sodium Chloride 1,000 ml @  400 mls/hr Q2H30M PRN IV PATENCY;  Start 4/13/20 at 

09:35;  Stop 4/13/20 at 21:34;  Status DC


Info (PHARMACY MONITORING -- do not chart) 1 each PRN DAILY  PRN MC SEE 

COMMENTS;  Start 4/13/20 at 09:45;  Status Cancel


Sodium Chloride 100 meq/Potassium Phosphate 19 mmol/ Magnesium Sulfate 12 

meq/Calcium Gluconate 15 meq/ Multivitamins 10 ml/Chromium/ Copper/Manganese/ 

Seleni/Zn 0.5 ml/ Insulin Human Regular 40 unit/ Potassium Chloride 20 meq/ 

Total Parenteral Nutrition/Amino Acids/Dextrose/ Fat Emulsion Intravenous 1,400 

ml @  58.333 mls/ hr TPN  CONT IV  Last administered on 4/13/20at 22:02;  Start 

4/13/20 at 22:00;  Stop 4/14/20 at 21:59;  Status DC


Furosemide (Lasix) 40 mg 1X  ONCE IVP  Last administered on 4/13/20at 14:39;  

Start 4/13/20 at 14:30;  Stop 4/13/20 at 14:31;  Status DC


Metronidazole 100 ml @  100 mls/hr Q8HRS IV  Last administered on 4/21/20at 

06:04;  Start 4/14/20 at 10:00;  Stop 4/21/20 at 08:10;  Status DC


Sodium Chloride 1,000 ml @  1,000 mls/hr Q1H PRN IV hypotension;  Start 4/14/20 

at 08:00;  Stop 4/14/20 at 13:59;  Status DC


Albumin Human 200 ml @  200 mls/hr 1X PRN  PRN IV Hypotension;  Start 4/14/20 at

08:00;  Stop 4/14/20 at 13:59;  Status DC


Sodium Chloride 1,000 ml @  400 mls/hr Q2H30M PRN IV PATENCY;  Start 4/14/20 at 

08:00;  Stop 4/14/20 at 19:59;  Status DC


Info (PHARMACY MONITORING -- do not chart) 1 each PRN DAILY  PRN MC SEE 

COMMENTS;  Start 4/14/20 at 11:30;  Status UNV


Info (PHARMACY MONITORING -- do not chart) 1 each PRN DAILY  PRN MC SEE 

COMMENTS;  Start 4/14/20 at 11:30;  Stop 4/16/20 at 12:13;  Status DC


Sodium Chloride 100 meq/Potassium Phosphate 19 mmol/ Magnesium Sulfate 12 

meq/Calcium Gluconate 15 meq/ Multivitamins 10 ml/Chromium/ Copper/Manganese/ 

Seleni/Zn 0.5 ml/ Insulin Human Regular 40 unit/ Potassium Chloride 20 meq/ 

Total Parenteral Nutrition/Amino Acids/Dextrose/ Fat Emulsion Intravenous 1,400 

ml @  58.333 mls/ hr TPN  CONT IV  Last administered on 4/14/20at 21:52;  Start 

4/14/20 at 22:00;  Stop 4/15/20 at 21:59;  Status DC


Sodium Chloride (Normal Saline Flush) 10 ml QSHIFT  PRN IV AFTER MEDS AND BLOOD 

DRAWS;  Start 4/14/20 at 15:00;  Stop 5/12/20 at 11:27;  Status DC


Sodium Chloride (Normal Saline Flush) 10 ml PRN Q5MIN  PRN IV AFTER MEDS AND 

BLOOD DRAWS;  Start 4/14/20 at 15:00


Sodium Chloride (Normal Saline Flush) 20 ml PRN Q5MIN  PRN IV AFTER MEDS AND 

BLOOD DRAWS;  Start 4/14/20 at 15:00


Sodium Chloride 100 meq/Potassium Phosphate 19 mmol/ Magnesium Sulfate 12 

meq/Calcium Gluconate 15 meq/ Multivitamins 10 ml/Chromium/ Copper/Manganese/ 

Seleni/Zn 0.5 ml/ Insulin Human Regular 40 unit/ Potassium Chloride 20 meq/ 

Total Parenteral Nutrition/Amino Acids/Dextrose/ Fat Emulsion Intravenous 1,400 

ml @  58.333 mls/ hr TPN  CONT IV  Last administered on 4/15/20at 21:20;  Start 

4/15/20 at 22:00;  Stop 4/16/20 at 21:59;  Status DC


Lidocaine HCl (Buffered Lidocaine 1%) 3 ml STK-MED ONCE .ROUTE ;  Start 4/15/20 

at 13:16;  Stop 4/15/20 at 13:16;  Status DC


Lidocaine HCl (Buffered Lidocaine 1%) 6 ml 1X  ONCE INJ  Last administered on 

4/15/20at 13:45;  Start 4/15/20 at 13:30;  Stop 4/15/20 at 13:31;  Status DC


Albumin Human 100 ml @  100 mls/hr 1X  ONCE IV  Last administered on 4/15/20at 

15:41;  Start 4/15/20 at 15:00;  Stop 4/15/20 at 15:59;  Status DC


Albumin Human 50 ml @ 50 mls/hr 1X  ONCE IV  Last administered on 4/15/20at 

15:00;  Start 4/15/20 at 15:00;  Stop 4/15/20 at 15:59;  Status DC


Info (PHARMACY MONITORING -- do not chart) 1 each PRN DAILY  PRN MC SEE COMME

NTS;  Start 4/16/20 at 11:30;  Status Cancel


Info (PHARMACY MONITORING -- do not chart) 1 each PRN DAILY  PRN MC SEE 

COMMENTS;  Start 4/16/20 at 11:30;  Status UNV


Sodium Chloride 100 meq/Potassium Phosphate 10 mmol/ Magnesium Sulfate 12 

meq/Calcium Gluconate 15 meq/ Multivitamins 10 ml/Chromium/ Copper/Manganese/ 

Seleni/Zn 0.5 ml/ Insulin Human Regular 35 unit/ Potassium Chloride 20 meq/ 

Total Parenteral Nutrition/Amino Acids/Dextrose/ Fat Emulsion Intravenous 1,400 

ml @  58.333 mls/ hr TPN  CONT IV  Last administered on 4/16/20at 22:10;  Start 

4/16/20 at 22:00;  Stop 4/17/20 at 21:59;  Status DC


Sodium Chloride 100 meq/Potassium Phosphate 5 mmol/ Magnesium Sulfate 12 

meq/Calcium Gluconate 15 meq/ Multivitamins 10 ml/Chromium/ Copper/Manganese/ 

Seleni/Zn 0.5 ml/ Insulin Human Regular 35 unit/ Potassium Chloride 20 meq/ 

Total Parenteral Nutrition/Amino Acids/Dextrose/ Fat Emulsion Intravenous 1,400 

ml @  58.333 mls/ hr TPN  CONT IV  Last administered on 4/17/20at 22:59;  Start 

4/17/20 at 22:00;  Stop 4/18/20 at 21:59;  Status DC


Sodium Chloride 1,000 ml @  1,000 mls/hr Q1H PRN IV hypotension;  Start 4/18/20 

at 08:27;  Stop 4/18/20 at 14:26;  Status DC


Albumin Human 200 ml @  200 mls/hr 1X PRN  PRN IV Hypotension Last administered 

on 4/18/20at 09:18;  Start 4/18/20 at 08:30;  Stop 4/18/20 at 14:29;  Status DC


Sodium Chloride 1,000 ml @  400 mls/hr Q2H30M PRN IV PATENCY;  Start 4/18/20 at 

08:27;  Stop 4/18/20 at 20:26;  Status DC


Info (PHARMACY MONITORING -- do not chart) 1 each PRN DAILY  PRN MC SEE 

COMMENTS;  Start 4/18/20 at 08:30;  Status Cancel


Info (PHARMACY MONITORING -- do not chart) 1 each PRN DAILY  PRN MC SEE 

COMMENTS;  Start 4/18/20 at 08:30;  Stop 4/26/20 at 13:10;  Status DC


Sodium Chloride 100 meq/Potassium Chloride 40 meq/ Magnesium Sulfate 15 meq/

Calcium Gluconate 15 meq/ Multivitamins 10 ml/Chromium/ Copper/Manganese/ 

Seleni/Zn 0.5 ml/ Insulin Human Regular 35 unit/ Total Parenteral 

Nutrition/Amino Acids/Dextrose/ Fat Emulsion Intravenous 1,400 ml @  58.333 mls/

hr TPN  CONT IV  Last administered on 4/18/20at 22:00;  Start 4/18/20 at 22:00; 

Stop 4/19/20 at 21:59;  Status DC


Potassium Chloride/Water 100 ml @  100 mls/hr 1X  ONCE IV  Last administered on 

4/18/20at 17:28;  Start 4/18/20 at 14:45;  Stop 4/18/20 at 15:44;  Status DC


Sodium Chloride 100 meq/Potassium Chloride 40 meq/ Magnesium Sulfate 15 

meq/Calcium Gluconate 15 meq/ Multivitamins 10 ml/Chromium/ Copper/Manganese/ 

Seleni/Zn 0.5 ml/ Insulin Human Regular 35 unit/ Total Parenteral 

Nutrition/Amino Acids/Dextrose/ Fat Emulsion Intravenous 1,400 ml @  58.333 mls/

hr TPN  CONT IV  Last administered on 4/19/20at 22:46;  Start 4/19/20 at 22:00; 

Stop 4/20/20 at 21:59;  Status DC


Sodium Chloride 100 meq/Potassium Chloride 40 meq/ Magnesium Sulfate 20 

meq/Calcium Gluconate 15 meq/ Multivitamins 10 ml/Chromium/ Copper/Manganese/ 

Seleni/Zn 0.5 ml/ Insulin Human Regular 35 unit/ Total Parenteral 

Nutrition/Amino Acids/Dextrose/ Fat Emulsion Intravenous 1,400 ml @  58.333 mls/

hr TPN  CONT IV  Last administered on 4/20/20at 22:31;  Start 4/20/20 at 22:00; 

Stop 4/21/20 at 21:59;  Status DC


Fentanyl Citrate (Fentanyl 2ml Vial) 50 mcg PRN Q2HR  PRN IVP PAIN Last 

administered on 4/27/20at 13:32;  Start 4/20/20 at 21:00;  Stop 4/28/20 at 

12:53;  Status DC


Fentanyl Citrate (Fentanyl 2ml Vial) 25 mcg PRN Q2HR  PRN IVP PAIN;  Start 

4/20/20 at 21:00;  Stop 4/28/20 at 12:54;  Status DC


Enoxaparin Sodium (Lovenox 100mg Syringe) 100 mg Q12HR SQ ;  Start 4/21/20 at 

21:00;  Status UNV


Amino Acids/ Glycerin/ Electrolytes 1,000 ml @  75 mls/hr [id number] IV ;  Start 

4/20/20 at 21:15;  Status UNV


Sodium Chloride 1,000 ml @  1,000 mls/hr Q1H PRN IV hypotension;  Start 4/21/20 

at 07:56;  Stop 4/21/20 at 13:55;  Status DC


Albumin Human 200 ml @  200 mls/hr 1X PRN  PRN IV Hypotension Last administered 

on 4/21/20at 08:40;  Start 4/21/20 at 08:00;  Stop 4/21/20 at 13:59;  Status DC


Sodium Chloride 1,000 ml @  400 mls/hr Q2H30M PRN IV PATENCY;  Start 4/21/20 at 

07:56;  Stop 4/21/20 at 19:55;  Status DC


Info (PHARMACY MONITORING -- do not chart) 1 each PRN DAILY  PRN MC SEE 

COMMENTS;  Start 4/21/20 at 08:00;  Status UNV


Info (PHARMACY MONITORING -- do not chart) 1 each PRN DAILY  PRN MC SEE 

COMMENTS;  Start 4/21/20 at 08:00;  Status UNV


Daptomycin 430 mg/ Sodium Chloride 50 ml @  100 mls/hr Q24H IV  Last 

administered on 4/21/20at 12:35;  Start 4/21/20 at 09:00;  Stop 4/21/20 at 

12:49;  Status DC


Sodium Chloride 100 meq/Potassium Chloride 40 meq/ Magnesium Sulfate 20 

meq/Calcium Gluconate 15 meq/ Multivitamins 10 ml/Chromium/ Copper/Manganese/ 

Seleni/Zn 0.5 ml/ Insulin Human Regular 35 unit/ Total Parenteral 

Nutrition/Amino Acids/Dextrose/ Fat Emulsion Intravenous 1,400 ml @  58.333 mls/

hr TPN  CONT IV  Last administered on 4/21/20at 21:26;  Start 4/21/20 at 22:00; 

Stop 4/22/20 at 21:59;  Status DC


Daptomycin 430 mg/ Sodium Chloride 50 ml @  100 mls/hr Q48H IV ;  Start 4/23/20 

at 09:00;  Stop 4/22/20 at 11:55;  Status DC


Sodium Chloride 100 meq/Potassium Chloride 40 meq/ Magnesium Sulfate 20 

meq/Calcium Gluconate 15 meq/ Multivitamins 10 ml/Chromium/ Copper/Manganese/ 

Seleni/Zn 0.5 ml/ Insulin Human Regular 35 unit/ Total Parenteral 

Nutrition/Amino Acids/Dextrose/ Fat Emulsion Intravenous 1,400 ml @  58.333 mls/

hr TPN  CONT IV  Last administered on 4/22/20at 22:27;  Start 4/22/20 at 22:00; 

Stop 4/23/20 at 21:59;  Status DC


Daptomycin 430 mg/ Sodium Chloride 50 ml @  100 mls/hr Q24H IV  Last 

administered on 4/24/20at 15:07;  Start 4/22/20 at 13:00;  Stop 4/25/20 at 

13:15;  Status DC


Sodium Chloride 100 meq/Potassium Chloride 40 meq/ Magnesium Sulfate 20 

meq/Calcium Gluconate 10 meq/ Multivitamins 10 ml/Chromium/ Copper/Manganese/ 

Seleni/Zn 0.5 ml/ Insulin Human Regular 35 unit/ Total Parenteral 

Nutrition/Amino Acids/Dextrose/ Fat Emulsion Intravenous 1,400 ml @  58.333 mls/

hr TPN  CONT IV  Last administered on 4/24/20at 00:06;  Start 4/23/20 at 22:00; 

Stop 4/24/20 at 21:59;  Status DC


Alteplase, Recombinant (Cathflo For Central Catheter Clearance) 1 mg 1X  ONCE I

NT CAT  Last administered on 4/24/20at 11:44;  Start 4/24/20 at 10:45;  Stop 

4/24/20 at 10:46;  Status DC


Ondansetron HCl (Zofran) 4 mg PRN Q6HRS  PRN IV NAUSEA/VOMITING;  Start 4/27/20 

at 07:00;  Stop 4/28/20 at 06:59;  Status DC


Fentanyl Citrate (Fentanyl 2ml Vial) 25 mcg PRN Q5MIN  PRN IV MILD PAIN 1-3;  

Start 4/27/20 at 07:00;  Stop 4/28/20 at 06:59;  Status DC


Fentanyl Citrate (Fentanyl 2ml Vial) 50 mcg PRN Q5MIN  PRN IV MODERATE TO SEVERE

PAIN Last administered on 4/27/20at 10:17;  Start 4/27/20 at 07:00;  Stop 

4/28/20 at 06:59;  Status DC


Ringer's Solution 1,000 ml @  30 mls/hr Q24H IV ;  Start 4/27/20 at 07:00;  Stop

4/27/20 at 18:59;  Status DC


Lidocaine HCl (Xylocaine-Mpf 1% 2ml Vial) 2 ml PRN 1X  PRN ID PRIOR TO IV START;

 Start 4/27/20 at 07:00;  Stop 4/28/20 at 06:59;  Status DC


Prochlorperazine Edisylate (Compazine) 5 mg PACU PRN  PRN IV NAUSEA, MRX1;  

Start 4/27/20 at 07:00;  Stop 4/28/20 at 06:59;  Status DC


Sodium Acetate 50 meq/Potassium Acetate 55 meq/ Magnesium Sulfate 20 meq/Calcium

Gluconate 10 meq/ Multivitamins 10 ml/Chromium/ Copper/Manganese/ Seleni/Zn 0.5 

ml/ Insulin Human Regular 35 unit/ Total Parenteral Nutrition/Amino 

Acids/Dextrose/ Fat Emulsion Intravenous 1,400 ml @  58.333 mls/ hr TPN  CONT IV

;  Start 4/24/20 at 22:00;  Stop 4/24/20 at 14:15;  Status DC


Sodium Acetate 50 meq/Potassium Acetate 55 meq/ Magnesium Sulfate 20 meq/Calcium

Gluconate 10 meq/ Multivitamins 10 ml/Chromium/ Copper/Manganese/ Seleni/Zn 0.5 

ml/ Insulin Human Regular 35 unit/ Total Parenteral Nutrition/Amino 

Acids/Dextrose/ Fat Emulsion Intravenous 1,800 ml @  75 mls/hr TPN  CONT IV  

Last administered on 4/24/20at 22:38;  Start 4/24/20 at 22:00;  Stop 4/25/20 at 

21:59;  Status DC


Sodium Chloride 1,000 ml @  1,000 mls/hr Q1H PRN IV hypotension;  Start 4/24/20 

at 15:31;  Stop 4/24/20 at 21:30;  Status DC


Diphenhydramine HCl (Benadryl) 25 mg 1X PRN  PRN IV ITCHING;  Start 4/24/20 at 

15:45;  Stop 4/25/20 at 15:44;  Status DC


Diphenhydramine HCl (Benadryl) 25 mg 1X PRN  PRN IV ITCHING;  Start 4/24/20 at 

15:45;  Stop 4/25/20 at 15:44;  Status DC


Sodium Chloride 1,000 ml @  400 mls/hr Q2H30M PRN IV PATENCY;  Start 4/24/20 at 

15:31;  Stop 4/25/20 at 03:30;  Status DC


Info (PHARMACY MONITORING -- do not chart) 1 each PRN DAILY  PRN MC SEE 

COMMENTS;  Start 4/24/20 at 15:45;  Stop 5/26/20 at 14:14;  Status DC


Sodium Acetate 50 meq/Potassium Acetate 55 meq/ Magnesium Sulfate 20 meq/Calcium

Gluconate 10 meq/ Multivitamins 10 ml/Chromium/ Copper/Manganese/ Seleni/Zn 0.5 

ml/ Insulin Human Regular 35 unit/ Total Parenteral Nutrition/Amino 

Acids/Dextrose/ Fat Emulsion Intravenous 1,800 ml @  75 mls/hr TPN  CONT IV  

Last administered on 4/25/20at 22:03;  Start 4/25/20 at 22:00;  Stop 4/26/20 at 

21:59;  Status DC


Daptomycin 430 mg/ Sodium Chloride 50 ml @  100 mls/hr Q24H IV  Last 

administered on 4/30/20at 13:00;  Start 4/25/20 at 13:00;  Stop 4/30/20 at 

20:58;  Status DC


Heparin Sodium (Porcine) 1000 unit/Sodium Chloride 1,001 ml @  1,001 mls/hr 1X  

ONCE IRR ;  Start 4/27/20 at 06:00;  Stop 4/27/20 at 06:59;  Status DC


Potassium Acetate 55 meq/Magnesium Sulfate 20 meq/ Calcium Gluconate 10 meq/ 

Multivitamins 10 ml/Chromium/ Copper/Manganese/ Seleni/Zn 0.5 ml/ Insulin Human 

Regular 35 unit/ Total Parenteral Nutrition/Amino Acids/Dextrose/ Fat Emulsion 

Intravenous 1,920 ml @  80 mls/hr TPN  CONT IV  Last administered on 4/26/20at 

22:10;  Start 4/26/20 at 22:00;  Stop 4/27/20 at 21:59;  Status DC


Dexamethasone Sodium Phosphate (Decadron) 4 mg STK-MED ONCE .ROUTE ;  Start 

4/27/20 at 10:56;  Stop 4/27/20 at 10:57;  Status DC


Ondansetron HCl (Zofran) 4 mg STK-MED ONCE .ROUTE ;  Start 4/27/20 at 10:56;  

Stop 4/27/20 at 10:57;  Status DC


Rocuronium Bromide (Zemuron) 50 mg STK-MED ONCE .ROUTE ;  Start 4/27/20 at 

10:56;  Stop 4/27/20 at 10:57;  Status DC


Fentanyl Citrate (Fentanyl 2ml Vial) 100 mcg STK-MED ONCE .ROUTE ;  Start 

4/27/20 at 10:56;  Stop 4/27/20 at 10:57;  Status DC


Bupivacaine HCl/ Epinephrine Bitart (Sensorcain-Epi 0.5%-1:374174 Mpf) 30 ml 

STK-MED ONCE .ROUTE  Last administered on 4/27/20at 12:01;  Start 4/27/20 at 

10:58;  Stop 4/27/20 at 10:58;  Status DC


Cellulose (Surgicel Hemostat 2x14) 1 each STK-MED ONCE .ROUTE ;  Start 4/27/20 

at 10:58;  Stop 4/27/20 at 10:59;  Status DC


Iohexol (Omnipaque 300 Mg/ml) 50 ml STK-MED ONCE .ROUTE ;  Start 4/27/20 at 

10:58;  Stop 4/27/20 at 10:59;  Status DC


Cellulose (Surgicel Hemostat 4x8) 1 each STK-MED ONCE .ROUTE ;  Start 4/27/20 at

10:58;  Stop 4/27/20 at 10:59;  Status DC


Bisacodyl (Dulcolax Supp) 10 mg STK-MED ONCE .ROUTE ;  Start 4/27/20 at 10:59;  

Stop 4/27/20 at 10:59;  Status DC


Heparin Sodium (Porcine) 1000 unit/Sodium Chloride 1,001 ml @  1,001 mls/hr 1X  

ONCE IRR ;  Start 4/27/20 at 12:00;  Stop 4/27/20 at 12:59;  Status DC


Propofol 20 ml @ As Directed STK-MED ONCE IV ;  Start 4/27/20 at 11:05;  Stop 

4/27/20 at 11:05;  Status DC


Sevoflurane (Ultane) 90 ml STK-MED ONCE IH ;  Start 4/27/20 at 11:05;  Stop 

4/27/20 at 11:05;  Status DC


Sevoflurane (Ultane) 60 ml STK-MED ONCE IH ;  Start 4/27/20 at 12:26;  Stop 

4/27/20 at 12:27;  Status DC


Propofol 20 ml @ As Directed STK-MED ONCE IV ;  Start 4/27/20 at 12:26;  Stop 

4/27/20 at 12:27;  Status DC


Phenylephrine HCl (PHENYLEPHRINE in 0.9% NACL PF) 1 mg STK-MED ONCE IV ;  Start 

4/27/20 at 12:34;  Stop 4/27/20 at 12:34;  Status DC


Heparin Sodium (Porcine) (Heparin Sodium) 5,000 unit Q12HR SQ  Last administered

on 5/6/20at 20:57;  Start 4/27/20 at 21:00;  Stop 5/7/20 at 09:59;  Status DC


Sodium Chloride (Normal Saline Flush) 3 ml QSHIFT  PRN IV AFTER MEDS AND BLOOD 

DRAWS;  Start 4/27/20 at 13:45;  Status Cancel


Naloxone HCl (Narcan) 0.4 mg PRN Q2MIN  PRN IV SEE INSTRUCTIONS Last 

administered on 6/6/20at 15:15;  Start 4/27/20 at 13:45;  Stop 7/1/20 at 16:00; 

Status DC


Sodium Chloride 1,000 ml @  25 mls/hr Q24H IV  Last administered on 5/26/20at 

13:37;  Start 4/27/20 at 13:37;  Stop 5/29/20 at 13:09;  Status DC


Naloxone HCl (Narcan) 0.4 mg PRN Q2MIN  PRN IV SEE INSTRUCTIONS;  Start 4/27/20 

at 14:30;  Status UNV


Sodium Chloride 1,000 ml @  25 mls/hr Q24H IV ;  Start 4/27/20 at 14:30;  Status

UNV


Hydromorphone HCl 30 ml @ 0 mls/hr CONT PRN  PRN IV PER PROTOCOL Last 

administered on 5/2/20at 16:08;  Start 4/27/20 at 14:30;  Stop 5/4/20 at 08:55; 

Status DC


Potassium Acetate 55 meq/Magnesium Sulfate 20 meq/ Calcium Gluconate 10 meq/ 

Multivitamins 10 ml/Chromium/ Copper/Manganese/ Seleni/Zn 0.5 ml/ Insulin Human 

Regular 35 unit/ Total Parenteral Nutrition/Amino Acids/Dextrose/ Fat Emulsion 

Intravenous 1,920 ml @  80 mls/hr TPN  CONT IV  Last administered on 4/27/20at 

22:01;  Start 4/27/20 at 22:00;  Stop 4/28/20 at 21:59;  Status DC


Bumetanide (Bumex) 2 mg [id number] IV  Last administered on 5/1/20at 13:50;  Start 

4/28/20 at 14:00;  Stop 5/2/20 at 14:10;  Status DC


Meropenem 1 gm/ Sodium Chloride 100 ml @  200 mls/hr Q8HRS IV  Last administered

on 5/22/20at 05:53;  Start 4/28/20 at 14:00;  Stop 5/22/20 at 09:31;  Status DC


Potassium Acetate 55 meq/Magnesium Sulfate 20 meq/ Calcium Gluconate 10 meq/ 

Multivitamins 10 ml/Chromium/ Copper/Manganese/ Seleni/Zn 0.5 ml/ Insulin Human 

Regular 35 unit/ Total Parenteral Nutrition/Amino Acids/Dextrose/ Fat Emulsion 

Intravenous 1,920 ml @  80 mls/hr TPN  CONT IV  Last administered on 4/28/20at 

22:02;  Start 4/28/20 at 22:00;  Stop 4/29/20 at 21:59;  Status DC


Hydromorphone HCl (Dilaudid Standard PCA) 12 mg STK-MED ONCE IV ;  Start 4/27/20

at 14:35;  Stop 4/28/20 at 13:53;  Status DC


Artificial Tears (Artificial Tears) 1 drop PRN Q15MIN  PRN OU DRY EYE Last 

administered on 6/23/20at 21:17;  Start 4/29/20 at 05:30


Hydromorphone HCl (Dilaudid Standard PCA) 12 mg STK-MED ONCE IV ;  Start 4/28/20

at 12:05;  Stop 4/29/20 at 09:15;  Status DC


Potassium Acetate 65 meq/Magnesium Sulfate 20 meq/ Calcium Gluconate 10 meq/ 

Multivitamins 10 ml/Chromium/ Copper/Manganese/ Seleni/Zn 0.5 ml/ Insulin Human 

Regular 30 unit/ Total Parenteral Nutrition/Amino Acids/Dextrose/ Fat Emulsion 

Intravenous 1,920 ml @  80 mls/hr TPN  CONT IV  Last administered on 4/29/20at 

22:22;  Start 4/29/20 at 22:00;  Stop 4/30/20 at 21:59;  Status DC


Cyclobenzaprine HCl (Flexeril) 10 mg PRN Q6HRS  PRN PO MUSCLE SPASMS Last 

administered on 7/10/20at 19:12;  Start 4/30/20 at 10:45


Potassium Acetate 55 meq/Magnesium Sulfate 20 meq/ Calcium Gluconate 10 meq/ 

Multivitamins 10 ml/Chromium/ Copper/Manganese/ Seleni/Zn 0.5 ml/ Insulin Human 

Regular 30 unit/ Total Parenteral Nutrition/Amino Acids/Dextrose/ Fat Emulsion 

Intravenous 1,920 ml @  80 mls/hr TPN  CONT IV  Last administered on 5/1/20at 

01:00;  Start 4/30/20 at 22:00;  Stop 5/1/20 at 21:59;  Status DC


Magnesium Sulfate 50 ml @ 25 mls/hr 1X  ONCE IV  Last administered on 4/30/20at 

17:18;  Start 4/30/20 at 12:45;  Stop 4/30/20 at 14:44;  Status DC


Potassium Chloride/Water 100 ml @  100 mls/hr 1X  ONCE IV  Last administered on 

5/1/20at 11:27;  Start 5/1/20 at 12:00;  Stop 5/1/20 at 12:59;  Status DC


Hydromorphone HCl (Dilaudid Standard PCA) 12 mg STK-MED ONCE IV ;  Start 4/29/20

at 10:50;  Stop 5/1/20 at 11:02;  Status DC


Hydromorphone HCl (Dilaudid Standard PCA) 12 mg STK-MED ONCE IV ;  Start 4/30/20

at 13:47;  Stop 5/1/20 at 11:03;  Status DC


Potassium Acetate 30 meq/Magnesium Sulfate 20 meq/ Calcium Gluconate 10 meq/ 

Multivitamins 10 ml/Chromium/ Copper/Manganese/ Seleni/Zn 0.5 ml/ Insulin Human 

Regular 30 unit/ Potassium Chloride 30 meq/ Total Parenteral Nutrition/Amino 

Acids/Dextrose/ Fat Emulsion Intravenous 1,920 ml @  80 mls/hr TPN  CONT IV  

Last administered on 5/1/20at 22:34;  Start 5/1/20 at 22:00;  Stop 5/2/20 at 

21:59;  Status DC


Potassium Chloride/Water 100 ml @  100 mls/hr Q1H IV  Last administered on 

5/2/20at 13:05;  Start 5/2/20 at 07:00;  Stop 5/2/20 at 10:59;  Status DC


Magnesium Sulfate 50 ml @ 25 mls/hr 1X  ONCE IV  Last administered on 5/2/20at 

10:34;  Start 5/2/20 at 10:30;  Stop 5/2/20 at 12:29;  Status DC


Potassium Chloride 75 meq/ Magnesium Sulfate 20 meq/Calcium Gluconate 10 meq/ 

Multivitamins 10 ml/Chromium/ Copper/Manganese/ Seleni/Zn 0.5 ml/ Insulin Human 

Regular 30 unit/ Total Parenteral Nutrition/Amino Acids/Dextrose/ Fat Emulsion 

Intravenous 1,920 ml @  80 mls/hr TPN  CONT IV  Last administered on 5/2/20at 

21:51;  Start 5/2/20 at 22:00;  Stop 5/3/20 at 22:00;  Status DC


Potassium Chloride 75 meq/ Magnesium Sulfate 20 meq/Calcium Gluconate 10 meq/ 

Multivitamins 10 ml/Chromium/ Copper/Manganese/ Seleni/Zn 0.5 ml/ Insulin Human 

Regular 25 unit/ Total Parenteral Nutrition/Amino Acids/Dextrose/ Fat Emulsion 

Intravenous 1,920 ml @  80 mls/hr TPN  CONT IV  Last administered on 5/3/20at 

22:04;  Start 5/3/20 at 22:00;  Stop 5/4/20 at 21:59;  Status DC


Hydromorphone HCl (Dilaudid) 0.4 mg PRN Q4HRS  PRN IVP PAIN Last administered on

5/4/20at 10:57;  Start 5/4/20 at 09:00;  Stop 5/4/20 at 18:59;  Status DC


Micafungin Sodium 100 mg/Dextrose 100 ml @  100 mls/hr Q24H IV  Last 

administered on 5/26/20at 12:17;  Start 5/4/20 at 11:00;  Stop 5/27/20 at 09:59;

 Status DC


Daptomycin 485 mg/ Sodium Chloride 50 ml @  100 mls/hr Q24H IV  Last 

administered on 5/11/20at 13:10;  Start 5/4/20 at 11:00;  Stop 5/12/20 at 07:44;

 Status DC


Potassium Chloride 75 meq/ Magnesium Sulfate 15 meq/Calcium Gluconate 8 meq/ 

Multivitamins 10 ml/Chromium/ Copper/Manganese/ Seleni/Zn 0.5 ml/ Insulin Human 

Regular 25 unit/ Total Parenteral Nutrition/Amino Acids/Dextrose/ Fat Emulsion 

Intravenous 1,920 ml @  80 mls/hr TPN  CONT IV  Last administered on 5/4/20at 

23:08;  Start 5/4/20 at 22:00;  Stop 5/5/20 at 21:59;  Status DC


Haloperidol Lactate (Haldol Inj) 3 mg 1X  ONCE IVP  Last administered on 

5/4/20at 14:37;  Start 5/4/20 at 14:30;  Stop 5/4/20 at 14:31;  Status DC


Hydromorphone HCl (Dilaudid) 1 mg PRN Q4HRS  PRN IVP PAIN Last administered on 

5/18/20at 06:25;  Start 5/4/20 at 19:00;  Stop 5/18/20 at 17:10;  Status DC


Potassium Chloride 75 meq/ Magnesium Sulfate 15 meq/Calcium Gluconate 8 meq/ 

Multivitamins 10 ml/Chromium/ Copper/Manganese/ Seleni/Zn 0.5 ml/ Insulin Human 

Regular 20 unit/ Total Parenteral Nutrition/Amino Acids/Dextrose/ Fat Emulsion 

Intravenous 1,920 ml @  80 mls/hr TPN  CONT IV  Last administered on 5/5/20at 

22:10;  Start 5/5/20 at 22:00;  Stop 5/6/20 at 21:59;  Status DC


Lidocaine HCl (Buffered Lidocaine 1%) 3 ml STK-MED ONCE .ROUTE ;  Start 5/6/20 

at 11:31;  Stop 5/6/20 at 11:31;  Status DC


Lidocaine HCl (Buffered Lidocaine 1%) 3 ml STK-MED ONCE .ROUTE ;  Start 5/6/20 

at 12:28;  Stop 5/6/20 at 12:29;  Status DC


Lidocaine HCl (Buffered Lidocaine 1%) 6 ml 1X  ONCE INJ  Last administered on 

5/6/20at 12:53;  Start 5/6/20 at 12:45;  Stop 5/6/20 at 12:46;  Status DC


Potassium Chloride 75 meq/ Magnesium Sulfate 15 meq/Calcium Gluconate 8 meq/ 

Multivitamins 10 ml/Chromium/ Copper/Manganese/ Seleni/Zn 0.5 ml/ Insulin Human 

Regular 20 unit/ Total Parenteral Nutrition/Amino Acids/Dextrose/ Fat Emulsion 

Intravenous 1,920 ml @  80 mls/hr TPN  CONT IV  Last administered on 5/6/20at 

22:00;  Start 5/6/20 at 22:00;  Stop 5/7/20 at 21:59;  Status DC


Potassium Chloride 75 meq/ Magnesium Sulfate 15 meq/Calcium Gluconate 8 meq/ 

Multivitamins 10 ml/Chromium/ Copper/Manganese/ Seleni/Zn 0.5 ml/ Insulin Human 

Regular 15 unit/ Total Parenteral Nutrition/Amino Acids/Dextrose/ Fat Emulsion 

Intravenous 1,920 ml @  80 mls/hr TPN  CONT IV  Last administered on 5/7/20at 

22:28;  Start 5/7/20 at 22:00;  Stop 5/8/20 at 21:59;  Status DC


Vecuronium Bromide (Norcuron Bolus) 6 mg PRN Q6HRS  PRN IV VENT ASYNCHRONY;  

Start 5/7/20 at 19:15;  Stop 5/7/20 at 19:35;  Status DC


Bumetanide (Bumex) 2 mg 1X  ONCE IV  Last administered on 5/7/20at 22:09;  Start

5/7/20 at 19:45;  Stop 5/7/20 at 19:46;  Status DC


Lidocaine HCl (Buffered Lidocaine 1%) 3 ml STK-MED ONCE .ROUTE ;  Start 5/8/20 

at 07:59;  Stop 5/8/20 at 07:59;  Status DC


Midazolam HCl (Versed) 5 mg STK-MED ONCE .ROUTE ;  Start 5/8/20 at 08:36;  Stop 

5/8/20 at 08:36;  Status DC


Fentanyl Citrate (Fentanyl 5ml Vial) 250 mcg STK-MED ONCE .ROUTE ;  Start 5/8/20

at 08:36;  Stop 5/8/20 at 08:37;  Status DC


Lidocaine HCl (Buffered Lidocaine 1%) 3 ml 1X  ONCE IJ  Last administered on 

5/8/20at 09:30;  Start 5/8/20 at 09:15;  Stop 5/8/20 at 09:16;  Status DC


Midazolam HCl (Versed) 5 mg 1X  ONCE IV  Last administered on 5/8/20at 09:30;  

Start 5/8/20 at 09:15;  Stop 5/8/20 at 09:16;  Status DC


Fentanyl Citrate (Fentanyl 5ml Vial) 250 mcg 1X  ONCE IV  Last administered on 

5/8/20at 09:30;  Start 5/8/20 at 09:15;  Stop 5/8/20 at 09:16;  Status DC


Bumetanide (Bumex) 2 mg DAILY IV  Last administered on 5/18/20at 08:07;  Start 

5/8/20 at 10:00;  Stop 5/18/20 at 17:15;  Status DC


Potassium Chloride 75 meq/ Magnesium Sulfate 15 meq/ Multivitamins 10 

ml/Chromium/ Copper/Manganese/ Seleni/Zn 0.5 ml/ Insulin Human Regular 15 unit/ 

Total Parenteral Nutrition/Amino Acids/Dextrose/ Fat Emulsion Intravenous 1,920 

ml @  80 mls/hr TPN  CONT IV  Last administered on 5/8/20at 21:59;  Start 5/8/20

at 22:00;  Stop 5/9/20 at 21:59;  Status DC


Metoclopramide HCl (Reglan Vial) 10 mg PRN Q3HRS  PRN IVP NAUSEA/VOMITING-3rd 

choice Last administered on 5/14/20at 04:25;  Start 5/9/20 at 16:45


Potassium Chloride 75 meq/ Magnesium Sulfate 15 meq/ Multivitamins 10 

ml/Chromium/ Copper/Manganese/ Seleni/Zn 0.5 ml/ Insulin Human Regular 15 unit/ 

Total Parenteral Nutrition/Amino Acids/Dextrose/ Fat Emulsion Intravenous 1,920 

ml @  80 mls/hr TPN  CONT IV  Last administered on 5/9/20at 22:41;  Start 5/9/20

at 22:00;  Stop 5/10/20 at 21:59;  Status DC


Magnesium Sulfate 50 ml @ 25 mls/hr 1X  ONCE IV  Last administered on 5/10/20at 

10:44;  Start 5/10/20 at 09:00;  Stop 5/10/20 at 10:59;  Status DC


Potassium Chloride/Water 100 ml @  100 mls/hr 1X  ONCE IV  Last administered on 

5/10/20at 09:37;  Start 5/10/20 at 09:00;  Stop 5/10/20 at 09:59;  Status DC


Duloxetine HCl (Cymbalta) 30 mg DAILY PO  Last administered on 5/11/20at 09:48; 

Start 5/10/20 at 14:00;  Stop 5/13/20 at 10:25;  Status DC


Potassium Chloride 80 meq/ Magnesium Sulfate 20 meq/ Multivitamins 10 

ml/Chromium/ Copper/Manganese/ Seleni/Zn 0.5 ml/ Insulin Human Regular 15 unit/ 

Total Parenteral Nutrition/Amino Acids/Dextrose/ Fat Emulsion Intravenous 1,920 

ml @  80 mls/hr TPN  CONT IV  Last administered on 5/10/20at 21:42;  Start 

5/10/20 at 22:00;  Stop 5/11/20 at 21:59;  Status DC


Potassium Chloride 80 meq/ Magnesium Sulfate 20 meq/ Multivitamins 10 

ml/Chromium/ Copper/Manganese/ Seleni/Zn 0.5 ml/ Insulin Human Regular 15 unit/ 

Total Parenteral Nutrition/Amino Acids/Dextrose/ Fat Emulsion Intravenous 1,920 

ml @  80 mls/hr TPN  CONT IV  Last administered on 5/11/20at 22:20;  Start 

5/11/20 at 22:00;  Stop 5/12/20 at 21:59;  Status DC


Lidocaine HCl (Buffered Lidocaine 1%) 3 ml STK-MED ONCE .ROUTE ;  Start 5/12/20 

at 09:54;  Stop 5/12/20 at 09:55;  Status DC


Hydromorphone HCl (Dilaudid Standard PCA) 12 mg STK-MED ONCE IV ;  Start 5/1/20 

at 15:50;  Stop 5/12/20 at 11:24;  Status DC


Potassium Chloride 80 meq/ Magnesium Sulfate 20 meq/ Multivitamins 10 

ml/Chromium/ Copper/Manganese/ Seleni/Zn 0.5 ml/ Insulin Human Regular 15 unit/ 

Total Parenteral Nutrition/Amino Acids/Dextrose/ Fat Emulsion Intravenous 1,920 

ml @  80 mls/hr TPN  CONT IV  Last administered on 5/12/20at 21:40;  Start 

5/12/20 at 22:00;  Stop 5/13/20 at 21:59;  Status DC


Lidocaine HCl (Buffered Lidocaine 1%) 6 ml 1X  ONCE INJ  Last administered on 

5/12/20at 14:15;  Start 5/12/20 at 14:15;  Stop 5/12/20 at 14:16;  Status DC


Potassium Chloride 80 meq/ Magnesium Sulfate 20 meq/ Multivitamins 10 

ml/Chromium/ Copper/Manganese/ Seleni/Zn 1 ml/ Insulin Human Regular 15 unit/ To

chely Parenteral Nutrition/Amino Acids/Dextrose/ Fat Emulsion Intravenous 1,920 ml

@  80 mls/hr TPN  CONT IV  Last administered on 5/13/20at 22:04;  Start 5/13/20 

at 22:00;  Stop 5/14/20 at 21:59;  Status DC


Potassium Chloride/Water 100 ml @  100 mls/hr 1X  ONCE IV  Last administered on 

5/14/20at 11:34;  Start 5/14/20 at 11:00;  Stop 5/14/20 at 11:59;  Status DC


Potassium Chloride 90 meq/ Magnesium Sulfate 20 meq/ Multivitamins 10 

ml/Chromium/ Copper/Manganese/ Seleni/Zn 1 ml/ Insulin Human Regular 15 unit/ T

otal Parenteral Nutrition/Amino Acids/Dextrose/ Fat Emulsion Intravenous 1,920 

ml @  80 mls/hr TPN  CONT IV  Last administered on 5/14/20at 22:57;  Start 

5/14/20 at 22:00;  Stop 5/15/20 at 21:59;  Status DC


Potassium Chloride 90 meq/ Magnesium Sulfate 20 meq/ Multivitamins 10 

ml/Chromium/ Copper/Manganese/ Seleni/Zn 1 ml/ Insulin Human Regular 15 unit/ 

Total Parenteral Nutrition/Amino Acids/Dextrose/ Fat Emulsion Intravenous 1,920 

ml @  80 mls/hr TPN  CONT IV  Last administered on 5/15/20at 22:48;  Start 5/

15/20 at 22:00;  Stop 5/16/20 at 21:59;  Status DC


Potassium Chloride 90 meq/ Magnesium Sulfate 20 meq/ Multivitamins 10 ml/

mium/ Copper/Manganese/ Seleni/Zn 1 ml/ Insulin Human Regular 15 unit/ Total 

Parenteral Nutrition/Amino Acids/Dextrose/ Fat Emulsion Intravenous 1,890 ml @  

78.75 mls/ hr TPN  CONT IV  Last administered on 5/16/20at 22:15;  Start 5/16/20

at 22:00;  Stop 5/17/20 at 21:59;  Status DC


Linezolid/Dextrose 300 ml @  300 mls/hr Q12HR IV  Last administered on 5/19/20at

21:08;  Start 5/17/20 at 09:00;  Stop 5/20/20 at 08:11;  Status DC


Daptomycin 450 mg/ Sodium Chloride 50 ml @  100 mls/hr Q24H IV  Last 

administered on 5/20/20at 09:25;  Start 5/17/20 at 09:00;  Stop 5/21/20 at 

08:30;  Status DC


Potassium Chloride 90 meq/ Magnesium Sulfate 20 meq/ Multivitamins 10 

ml/Chromium/ Copper/Manganese/ Seleni/Zn 1 ml/ Insulin Human Regular 15 unit/ 

Total Parenteral Nutrition/Amino Acids/Dextrose/ Fat Emulsion Intravenous 1,890 

ml @  78.75 mls/ hr TPN  CONT IV  Last administered on 5/17/20at 21:34;  Start 

5/17/20 at 22:00;  Stop 5/18/20 at 21:59;  Status DC


Lorazepam (Ativan Inj) 2 mg STK-MED ONCE .ROUTE ;  Start 5/17/20 at 14:58;  Stop

5/17/20 at 14:58;  Status DC


Metoprolol Tartrate (Lopressor Vial) 5 mg 1X  ONCE IVP  Last administered on 

5/17/20at 15:31;  Start 5/17/20 at 15:15;  Stop 5/17/20 at 15:16;  Status DC


Lorazepam (Ativan Inj) 2 mg 1X  ONCE IVP  Last administered on 5/17/20at 15:30; 

Start 5/17/20 at 15:15;  Stop 5/17/20 at 15:16;  Status DC


Enoxaparin Sodium (Lovenox 40mg Syringe) 40 mg Q24H SQ  Last administered on 

6/5/20at 17:44;  Start 5/17/20 at 17:00;  Stop 6/7/20 at 06:50;  Status DC


Lorazepam (Ativan Inj) 1 mg PRN Q4HRS  PRN IVP ANXIETY / AGITATION MILD-MOD Last

administered on 5/31/20at 15:55;  Start 5/17/20 at 19:15;  Stop 6/2/20 at 11:45;

 Status DC


Lorazepam (Ativan Inj) 2 mg PRN Q4HRS  PRN IVP ANXIETY / AGITATION SEVERE Last 

administered on 6/1/20at 07:55;  Start 5/17/20 at 19:15;  Stop 6/2/20 at 11:45; 

Status DC


Fentanyl Citrate (Fentanyl 2ml Vial) 50 mcg PRN Q4HRS  PRN IVP SEVERE PAIN Last 

administered on 6/13/20at 05:15;  Start 5/18/20 at 13:15;  Stop 6/14/20 at 

09:29;  Status DC


Fentanyl Citrate (Fentanyl 2ml Vial) 25 mcg PRN Q4HRS  PRN IVP MODERATE PAIN 

Last administered on 6/13/20at 00:27;  Start 5/18/20 at 13:15;  Stop 6/14/20 at 

09:30;  Status DC


Potassium Chloride 90 meq/ Magnesium Sulfate 20 meq/ Multivitamins 10 

ml/Chromium/ Copper/Manganese/ Seleni/Zn 1 ml/ Insulin Human Regular 15 unit/ 

Total Parenteral Nutrition/Amino Acids/Dextrose/ Fat Emulsion Intravenous 1,890 

ml @  78.75 mls/ hr TPN  CONT IV  Last administered on 5/18/20at 22:18;  Start 

5/18/20 at 22:00;  Stop 5/19/20 at 21:59;  Status DC


Furosemide (Lasix) 40 mg 1X  ONCE IVP  Last administered on 5/18/20at 21:51;  

Start 5/18/20 at 21:45;  Stop 5/18/20 at 21:48;  Status DC


Albumin Human 100 ml @  100 mls/hr 1X PRN  PRN IV SEE COMMENTS;  Start 5/19/20 

at 01:30


Furosemide (Lasix) 40 mg [id number] IVP  Last administered on 6/3/20at 08:04;  Start 

5/19/20 at 14:00;  Stop 6/3/20 at 13:07;  Status DC


Potassium Chloride 90 meq/ Magnesium Sulfate 20 meq/ Multivitamins 10 

ml/Chromium/ Copper/Manganese/ Seleni/Zn 1 ml/ Insulin Human Regular 15 unit/ 

Total Parenteral Nutrition/Amino Acids/Dextrose/ Fat Emulsion Intravenous 1,800 

ml @  75 mls/hr TPN  CONT IV  Last administered on 5/19/20at 22:31;  Start 

5/19/20 at 22:00;  Stop 5/20/20 at 21:59;  Status DC


Potassium Chloride 90 meq/ Magnesium Sulfate 20 meq/ Multivitamins 10 

ml/Chromium/ Copper/Manganese/ Seleni/Zn 1 ml/ Insulin Human Regular 15 unit/ 

Total Parenteral Nutrition/Amino Acids/Dextrose/ Fat Emulsion Intravenous 1,800 

ml @  75 mls/hr TPN  CONT IV  Last administered on 5/20/20at 22:28;  Start 

5/20/20 at 22:00;  Stop 5/21/20 at 21:59;  Status DC


Potassium Chloride 110 meq/ Magnesium Sulfate 20 meq/ Multivitamins 10 

ml/Chromium/ Copper/Manganese/ Seleni/Zn 1 ml/ Insulin Human Regular 15 unit/ 

Total Parenteral Nutrition/Amino Acids/Dextrose/ Fat Emulsion Intravenous 1,800 

ml @  75 mls/hr TPN  CONT IV  Last administered on 5/21/20at 22:01;  Start 

5/21/20 at 22:00;  Stop 5/22/20 at 21:59;  Status DC


Saliva Substitute (Biotene Moisturizing Mouth) 2 spray PRN Q15MIN  PRN PO DRY 

MOUTH;  Start 5/21/20 at 11:00


Potassium Chloride 110 meq/ Magnesium Sulfate 20 meq/ Multivitamins 10 

ml/Chromium/ Copper/Manganese/ Seleni/Zn 1 ml/ Insulin Human Regular 15 unit/ 

Total Parenteral Nutrition/Amino Acids/Dextrose/ Fat Emulsion Intravenous 1,800 

ml @  75 mls/hr TPN  CONT IV  Last administered on 5/22/20at 22:21;  Start 

5/22/20 at 22:00;  Stop 5/23/20 at 21:59;  Status DC


Potassium Chloride 110 meq/ Magnesium Sulfate 20 meq/ Multivitamins 10 

ml/Chromium/ Copper/Manganese/ Seleni/Zn 1 ml/ Insulin Human Regular 15 unit/ 

Total Parenteral Nutrition/Amino Acids/Dextrose/ Fat Emulsion Intravenous 1,800 

ml @  75 mls/hr TPN  CONT IV  Last administered on 5/23/20at 22:04;  Start 

5/23/20 at 22:00;  Stop 5/24/20 at 21:59;  Status DC


Potassium Chloride 110 meq/ Magnesium Sulfate 20 meq/ Multivitamins 10 

ml/Chromium/ Copper/Manganese/ Seleni/Zn 1 ml/ Insulin Human Regular 15 unit/ 

Total Parenteral Nutrition/Amino Acids/Dextrose/ Fat Emulsion Intravenous 1,800 

ml @  75 mls/hr TPN  CONT IV  Last administered on 5/24/20at 22:48;  Start 

5/24/20 at 22:00;  Stop 5/25/20 at 21:59;  Status DC


Potassium Chloride 70 meq/ Magnesium Sulfate 20 meq/ Multivitamins 10 

ml/Chromium/ Copper/Manganese/ Seleni/Zn 1 ml/ Insulin Human Regular 15 unit/ 

Total Parenteral Nutrition/Amino Acids/Dextrose/ Fat Emulsion Intravenous 1,800 

ml @  75 mls/hr TPN  CONT IV  Last administered on 5/25/20at 21:39;  Start 

5/25/20 at 22:00;  Stop 5/26/20 at 21:59;  Status DC


Meropenem 500 mg/ Sodium Chloride 50 ml @  100 mls/hr Q6HRS IV  Last 

administered on 5/27/20at 06:02;  Start 5/25/20 at 18:00;  Stop 5/27/20 at 

09:59;  Status DC


Barium Sulfate (Varibar Thin Liquid Apple) 148 gm 1X  ONCE PO ;  Start 5/26/20 

at 11:45;  Stop 5/26/20 at 11:49;  Status DC


Potassium Chloride 70 meq/ Magnesium Sulfate 20 meq/ Multivitamins 10 ml/Chromiu

m/ Copper/Manganese/ Seleni/Zn 1 ml/ Insulin Human Regular 15 unit/ Total 

Parenteral Nutrition/Amino Acids/Dextrose/ Fat Emulsion Intravenous 1,800 ml @  

75 mls/hr TPN  CONT IV  Last administered on 5/26/20at 22:27;  Start 5/26/20 at 

22:00;  Stop 5/27/20 at 21:59;  Status DC


Piperacillin Sod/ Tazobactam Sod 3.375 gm/Sodium Chloride 50 ml @  100 mls/hr 

Q6HRS IV  Last administered on 6/4/20at 06:10;  Start 5/27/20 at 12:00;  Stop 

6/4/20 at 07:26;  Status DC


Potassium Chloride 70 meq/ Magnesium Sulfate 20 meq/ Multivitamins 10 

ml/Chromium/ Copper/Manganese/ Seleni/Zn 1 ml/ Insulin Human Regular 15 unit/ 

Total Parenteral Nutrition/Amino Acids/Dextrose/ Fat Emulsion Intravenous 1,800 

ml @  75 mls/hr TPN  CONT IV  Last administered on 5/27/20at 22:03;  Start 5/27/ 20 at 22:00;  Stop 5/28/20 at 21:59;  Status DC


Potassium Chloride 70 meq/ Magnesium Sulfate 20 meq/ Multivitamins 10 ml/Chromiu

m/ Copper/Manganese/ Seleni/Zn 1 ml/ Insulin Human Regular 15 unit/ Total 

Parenteral Nutrition/Amino Acids/Dextrose/ Fat Emulsion Intravenous 1,800 ml @  

75 mls/hr TPN  CONT IV  Last administered on 5/28/20at 22:33;  Start 5/28/20 at 

22:00;  Stop 5/29/20 at 21:59;  Status DC


Potassium Chloride 70 meq/ Magnesium Sulfate 20 meq/ Multivitamins 10 

ml/Chromium/ Copper/Manganese/ Seleni/Zn 1 ml/ Insulin Human Regular 15 unit/ 

Total Parenteral Nutrition/Amino Acids/Dextrose/ Fat Emulsion Intravenous 1,800 

ml @  75 mls/hr TPN  CONT IV  Last administered on 5/29/20at 23:13;  Start 

5/29/20 at 22:00;  Stop 5/30/20 at 21:59;  Status DC


Potassium Chloride 80 meq/ Magnesium Sulfate 20 meq/ Multivitamins 10 

ml/Chromium/ Copper/Manganese/ Seleni/Zn 1 ml/ Insulin Human Regular 15 unit/ 

Total Parenteral Nutrition/Amino Acids/Dextrose/ Fat Emulsion Intravenous 1,800 

ml @  75 mls/hr TPN  CONT IV  Last administered on 5/30/20at 22:30;  Start 

5/30/20 at 22:00;  Stop 5/31/20 at 21:59;  Status DC


Potassium Chloride 80 meq/ Magnesium Sulfate 20 meq/ Multivitamins 10 

ml/Chromium/ Copper/Manganese/ Seleni/Zn 1 ml/ Insulin Human Regular 15 unit/ 

Total Parenteral Nutrition/Amino Acids/Dextrose/ Fat Emulsion Intravenous 1,800 

ml @  75 mls/hr TPN  CONT IV  Last administered on 5/31/20at 21:54;  Start 

5/31/20 at 22:00;  Stop 6/1/20 at 21:59;  Status DC


Potassium Chloride/Water 100 ml @  100 mls/hr 1X  ONCE IV  Last administered on 

6/1/20at 10:15;  Start 6/1/20 at 10:00;  Stop 6/1/20 at 10:59;  Status DC


Potassium Chloride 90 meq/ Magnesium Sulfate 20 meq/ Multivitamins 10 

ml/Chromium/ Copper/Manganese/ Seleni/Zn 1 ml/ Insulin Human Regular 20 unit/ 

Total Parenteral Nutrition/Amino Acids/Dextrose/ Fat Emulsion Intravenous 1,800 

ml @  75 mls/hr TPN  CONT IV  Last administered on 6/1/20at 22:28;  Start 6/1/20

at 22:00;  Stop 6/2/20 at 21:59;  Status DC


Potassium Chloride 90 meq/ Magnesium Sulfate 20 meq/ Multivitamins 10 

ml/Chromium/ Copper/Manganese/ Seleni/Zn 1 ml/ Insulin Human Regular 20 unit/ 

Total Parenteral Nutrition/Amino Acids/Dextrose/ Fat Emulsion Intravenous 1,800 

ml @  75 mls/hr TPN  CONT IV  Last administered on 6/2/20at 22:08;  Start 6/2/20

at 22:00;  Stop 6/3/20 at 21:59;  Status DC


Lorazepam (Ativan Inj) 0.25 mg PRN Q4HRS  PRN IVP ANXIETY / AGITATION Last 

administered on 7/12/20at 00:27;  Start 6/3/20 at 07:30


Potassium Chloride 90 meq/ Magnesium Sulfate 20 meq/ Multivitamins 10 

ml/Chromium/ Copper/Manganese/ Seleni/Zn 1 ml/ Insulin Human Regular 20 unit/ 

Total Parenteral Nutrition/Amino Acids/Dextrose/ Fat Emulsion Intravenous 1,800 

ml @  75 mls/hr TPN  CONT IV  Last administered on 6/3/20at 23:13;  Start 6/3/20

at 22:00;  Stop 6/4/20 at 21:59;  Status DC


Furosemide (Lasix) 40 mg DAILY IVP  Last administered on 6/5/20at 11:14;  Start 

6/3/20 at 13:30;  Stop 6/7/20 at 09:12;  Status DC


Fluoxetine HCl (PROzac) 20 mg QHS PEG  Last administered on 7/17/20at 21:20;  

Start 6/4/20 at 21:00


Fentanyl (Duragesic 50mcg/ Hr Patch) 1 patch Q72H TD  Last administered on 

6/4/20at 21:22;  Start 6/4/20 at 21:00;  Stop 6/13/20 at 12:00;  Status DC


Potassium Chloride 40 meq/ Potassium Acetate 60 meq/Magnesium Sulfate 10 meq/ 

Multivitamins 10 ml/Chromium/ Copper/Manganese/ Seleni/Zn 1 ml/ Insulin Human 

Regular 20 unit/ Total Parenteral Nutrition/Amino Acids/Dextrose/ Fat Emulsion 

Intravenous 1,800 ml @  75 mls/hr TPN  CONT IV  Last administered on 6/5/20at 

00:03;  Start 6/4/20 at 22:00;  Stop 6/5/20 at 21:59;  Status DC


Potassium Acetate 80 meq/Magnesium Sulfate 5 meq/ Multivitamins 10 ml/Chromium/ 

Copper/Manganese/ Seleni/Zn 1 ml/ Insulin Human Regular 20 unit/ Total 

Parenteral Nutrition/Amino Acids/Dextrose/ Fat Emulsion Intravenous 1,920 ml @  

80 mls/hr TPN  CONT IV  Last administered on 6/5/20at 21:59;  Start 6/5/20 at 

22:00;  Stop 6/6/20 at 21:59;  Status DC


Potassium Acetate 60 meq/Magnesium Sulfate 5 meq/ Multivitamins 10 ml/Chromium/ 

Copper/Manganese/ Seleni/Zn 1 ml/ Insulin Human Regular 30 unit/ Total 

Parenteral Nutrition/Amino Acids/Dextrose/ Fat Emulsion Intravenous 1,920 ml @  

80 mls/hr TPN  CONT IV  Last administered on 6/6/20at 21:54;  Start 6/6/20 at 

22:00;  Stop 6/7/20 at 21:59;  Status DC


Norepinephrine Bitartrate 8 mg/ Dextrose 258 ml @  13.332 mls/ hr CONT  PRN IV 

PER PROTOCOL Last administered on 7/2/20at 09:09;  Start 6/7/20 at 06:30


Albumin Human 500 ml @  125 mls/hr 1X  ONCE IV  Last administered on 6/7/20at 

08:10;  Start 6/7/20 at 08:15;  Stop 6/7/20 at 12:14;  Status DC


Potassium Acetate 40 meq/Magnesium Sulfate 5 meq/ Multivitamins 10 ml/Chromium/ 

Copper/Manganese/ Seleni/Zn 1 ml/ Insulin Human Regular 30 unit/ Total 

Parenteral Nutrition/Amino Acids/Dextrose/ Fat Emulsion Intravenous 1,920 ml @  

80 mls/hr TPN  CONT IV  Last administered on 6/7/20at 22:23;  Start 6/7/20 at 

22:00;  Stop 6/8/20 at 21:59;  Status DC


Meropenem 1 gm/ Sodium Chloride 100 ml @  200 mls/hr Q8HRS IV ;  Start 6/7/20 at

14:00;  Status Cancel


Meropenem 1 gm/ Sodium Chloride 100 ml @  200 mls/hr Q8HRS IV  Last administered

on 6/7/20at 11:04;  Start 6/7/20 at 10:00;  Stop 6/7/20 at 13:00;  Status DC


Meropenem 1 gm/ Sodium Chloride 100 ml @  200 mls/hr Q12HR IV  Last administered

on 6/25/20at 08:27;  Start 6/7/20 at 21:00;  Stop 6/25/20 at 08:56;  Status DC


Sodium Chloride 1,000 ml @  1,000 mls/hr 1X  ONCE IV  Last administered on 

6/7/20at 11:06;  Start 6/7/20 at 10:45;  Stop 6/7/20 at 11:44;  Status DC


Micafungin Sodium 100 mg/Dextrose 100 ml @  100 mls/hr Q24H IV  Last 

administered on 6/24/20at 12:34;  Start 6/7/20 at 11:00;  Stop 6/25/20 at 08:56;

 Status DC


Daptomycin 410 mg/ Sodium Chloride 50 ml @  100 mls/hr Q24H IV  Last 

administered on 6/9/20at 13:33;  Start 6/7/20 at 14:00;  Stop 6/10/20 at 08:30; 

Status DC


Midazolam HCl (Versed) 2 mg STK-MED ONCE .ROUTE ;  Start 6/7/20 at 14:47;  Stop 

6/7/20 at 14:48;  Status DC


Fentanyl Citrate (Fentanyl 2ml Vial) 100 mcg STK-MED ONCE .ROUTE ;  Start 6/7/20

at 14:47;  Stop 6/7/20 at 14:48;  Status DC


Flumazenil (Romazicon) 0.5 mg STK-MED ONCE IV ;  Start 6/7/20 at 14:48;  Stop 

6/7/20 at 14:48;  Status DC


Naloxone HCl (Narcan) 0.4 mg STK-MED ONCE .ROUTE ;  Start 6/7/20 at 14:48;  Stop

6/7/20 at 14:48;  Status DC


Lidocaine HCl (Lidocaine 1% 20ml Vial) 20 ml STK-MED ONCE .ROUTE ;  Start 6/7/20

at 14:48;  Stop 6/7/20 at 14:48;  Status DC


Midazolam HCl (Versed) 2 mg 1X  ONCE IV  Last administered on 6/7/20at 15:28;  

Start 6/7/20 at 15:00;  Stop 6/7/20 at 15:01;  Status DC


Fentanyl Citrate (Fentanyl 2ml Vial) 100 mcg 1X  ONCE IV  Last administered on 

6/7/20at 15:28;  Start 6/7/20 at 15:00;  Stop 6/7/20 at 15:01;  Status DC


Lidocaine HCl (Lidocaine 1% 20ml Vial) 20 ml 1X  ONCE INJ  Last administered on 

6/7/20at 15:30;  Start 6/7/20 at 15:00;  Stop 6/7/20 at 15:01;  Status DC


Sodium Chloride 1,000 ml @  100 mls/hr Q10H IV  Last administered on 6/16/20at 

07:30;  Start 6/7/20 at 20:00;  Stop 6/16/20 at 11:26;  Status DC


Sodium Bicarbonate (Sodium Bicarb Adult 8.4% Syr) 50 meq 1X  ONCE IV  Last 

administered on 6/7/20at 21:47;  Start 6/7/20 at 22:00;  Stop 6/7/20 at 22:01;  

Status DC


Potassium Acetate 40 meq/Magnesium Sulfate 5 meq/ Multivitamins 10 ml/Chromium/ 

Copper/Manganese/ Seleni/Zn 1 ml/ Insulin Human Regular 30 unit/ Total Parentera

l Nutrition/Amino Acids/Dextrose/ Fat Emulsion Intravenous 1,920 ml @  80 mls/hr

TPN  CONT IV  Last administered on 6/8/20at 22:28;  Start 6/8/20 at 22:00;  Stop

6/9/20 at 21:59;  Status DC


Sodium Chloride 500 ml @  500 mls/hr 1X  ONCE IV  Last administered on 6/9/20at 

06:39;  Start 6/9/20 at 06:45;  Stop 6/9/20 at 07:44;  Status DC


Potassium Acetate 40 meq/Magnesium Sulfate 5 meq/ Multivitamins 10 ml/Chromium/ 

Copper/Manganese/ Seleni/Zn 1 ml/ Insulin Human Regular 30 unit/ Total 

Parenteral Nutrition/Amino Acids/Dextrose/ Fat Emulsion Intravenous 1,920 ml @  

80 mls/hr TPN  CONT IV  Last administered on 6/9/20at 22:03;  Start 6/9/20 at 

22:00;  Stop 6/10/20 at 21:59;  Status DC


Metoprolol Tartrate (Lopressor Vial) 5 mg PRN Q6HRS  PRN IVP HYPERTENSION Last 

administered on 7/12/20at 18:01;  Start 6/10/20 at 09:00


Potassium Acetate 40 meq/Magnesium Sulfate 5 meq/ Multivitamins 10 ml/Chromium/ 

Copper/Manganese/ Seleni/Zn 1 ml/ Insulin Human Regular 30 unit/ Total 

Parenteral Nutrition/Amino Acids/Dextrose/ Fat Emulsion Intravenous 1,920 ml @  

80 mls/hr TPN  CONT IV  Last administered on 6/10/20at 21:26;  Start 6/10/20 at 

22:00;  Stop 6/11/20 at 21:59;  Status DC


Potassium Acetate 40 meq/Magnesium Sulfate 5 meq/ Multivitamins 10 ml/Chromium/ 

Copper/Manganese/ Seleni/Zn 1 ml/ Insulin Human Regular 30 unit/ Total 

Parenteral Nutrition/Amino Acids/Dextrose/ Fat Emulsion Intravenous 1,920 ml @  

80 mls/hr TPN  CONT IV  Last administered on 6/11/20at 23:23;  Start 6/11/20 at 

22:00;  Stop 6/12/20 at 21:59;  Status DC


Potassium Acetate 40 meq/Magnesium Sulfate 5 meq/ Multivitamins 10 ml/Chromium/ 

Copper/Manganese/ Seleni/Zn 1 ml/ Insulin Human Regular 30 unit/ Total 

Parenteral Nutrition/Amino Acids/Dextrose/ Fat Emulsion Intravenous 1,920 ml @  

80 mls/hr TPN  CONT IV  Last administered on 6/12/20at 21:35;  Start 6/12/20 at 

22:00;  Stop 6/13/20 at 21:59;  Status DC


Furosemide (Lasix) 20 mg 1X  ONCE IVP  Last administered on 6/13/20at 06:26;  

Start 6/13/20 at 06:15;  Stop 6/13/20 at 06:16;  Status DC


Methylprednisolone Sodium Succinate (SOLU-Medrol 125MG VIAL) 125 mg 1X  ONCE IV 

Last administered on 6/13/20at 06:26;  Start 6/13/20 at 06:15;  Stop 6/13/20 at 

06:16;  Status DC


Albuterol/ Ipratropium (Duoneb) 3 ml Q4HRS NEB  Last administered on 7/18/20at 

08:53;  Start 6/13/20 at 08:00


Fentanyl Citrate 30 ml @ 0 mls/hr CONT  PRN IV SEE PROTOCOL Last administered on

7/4/20at 08:03;  Start 6/13/20 at 06:00;  Stop 7/4/20 at 12:42;  Status DC


Propofol 100 ml @ 0 mls/hr CONT  PRN IV SEE PROTOCOL Last administered on 

6/20/20at 23:50;  Start 6/13/20 at 06:00


Fentanyl Citrate (Fentanyl 2ml Vial) 25 mcg PRN Q1HR  PRN IV SEE COMMENTS Last 

administered on 7/17/20at 18:08;  Start 6/13/20 at 06:00


Fentanyl Citrate (Fentanyl 2ml Vial) 50 mcg PRN Q1HR  PRN IV SEE COMMENTS Last 

administered on 7/12/20at 18:02;  Start 6/13/20 at 06:00


Chlorhexidine Gluconate (Peridex) 15 ml BID MM ;  Start 6/13/20 at 09:00;  Stop 

6/13/20 at 07:58;  Status DC


Potassium Acetate 40 meq/Magnesium Sulfate 5 meq/ Multivitamins 10 ml/Chromium/ 

Copper/Manganese/ Seleni/Zn 1 ml/ Insulin Human Regular 30 unit/ Total 

Parenteral Nutrition/Amino Acids/Dextrose/ Fat Emulsion Intravenous 1,920 ml @  

80 mls/hr TPN  CONT IV  Last administered on 6/13/20at 21:19;  Start 6/13/20 at 

22:00;  Stop 6/14/20 at 21:59;  Status DC


Acetylcysteine (Mucomyst 20% Resp Treatment) 600 mg BID NEB  Last administered 

on 6/19/20at 09:33;  Start 6/13/20 at 21:00;  Stop 6/19/20 at 10:39;  Status DC


Magnesium Sulfate 100 ml @  25 mls/hr 1X  ONCE IV  Last administered on 

6/13/20at 15:48;  Start 6/13/20 at 15:45;  Stop 6/13/20 at 19:44;  Status DC


Potassium Acetate 40 meq/Magnesium Sulfate 5 meq/ Multivitamins 10 ml/Chromium/ 

Copper/Manganese/ Seleni/Zn 1 ml/ Insulin Human Regular 30 unit/ Total 

Parenteral Nutrition/Amino Acids/Dextrose/ Fat Emulsion Intravenous 1,920 ml @  

80 mls/hr TPN  CONT IV  Last administered on 6/14/20at 21:35;  Start 6/14/20 at 

22:00;  Stop 6/15/20 at 21:59;  Status DC


Potassium Chloride/Water 100 ml @  100 mls/hr Q1H IV  Last administered on 

6/15/20at 08:31;  Start 6/15/20 at 07:00;  Stop 6/15/20 at 08:59;  Status DC


Potassium Acetate 40 meq/Magnesium Sulfate 5 meq/ Multivitamins 10 ml/Chromium/ 

Copper/Manganese/ Seleni/Zn 1 ml/ Insulin Human Regular 30 unit/ Total 

Parenteral Nutrition/Amino Acids/Dextrose/ Fat Emulsion Intravenous 1,920 ml @  

80 mls/hr TPN  CONT IV  Last administered on 6/15/20at 21:54;  Start 6/15/20 at 

22:00;  Stop 6/16/20 at 19:34;  Status DC


Lidocaine HCl (Buffered Lidocaine 1%) 3 ml STK-MED ONCE .ROUTE ;  Start 6/15/20 

at 12:14;  Stop 6/15/20 at 12:14;  Status DC


Lidocaine HCl (Buffered Lidocaine 1%) 3 ml 1X  ONCE IJ  Last administered on 

6/15/20at 13:11;  Start 6/15/20 at 13:00;  Stop 6/15/20 at 13:01;  Status DC


Magnesium Sulfate 50 ml @ 25 mls/hr 1X  ONCE IV ;  Start 6/16/20 at 08:15;  Stop

6/16/20 at 10:14;  Status DC


Potassium Acetate 40 meq/Magnesium Sulfate 10 meq/ Multivitamins 10 ml/Chromium/

Copper/Manganese/ Seleni/Zn 1 ml/ Insulin Human Regular 20 unit/ Total 

Parenteral Nutrition/Amino Acids/Dextrose/ Fat Emulsion Intravenous 1,920 ml @  

80 mls/hr TPN  CONT IV  Last administered on 6/16/20at 21:32;  Start 6/16/20 at 

22:00;  Stop 6/17/20 at 21:59;  Status DC


Potassium Chloride/Water 100 ml @  100 mls/hr Q1H IV  Last administered on 

6/17/20at 09:12;  Start 6/17/20 at 08:00;  Stop 6/17/20 at 09:59;  Status DC


Alteplase, Recombinant (Cathflo For Central Catheter Clearance) 4 mg 1X  ONCE 

INT CAT ;  Start 6/17/20 at 09:15;  Stop 6/17/20 at 09:16;  Status UNV


Alteplase, Recombinant (Cathflo For Central Catheter Clearance) 4 mg 1X  ONCE 

INT CAT ;  Start 6/17/20 at 09:15;  Stop 6/17/20 at 09:16;  Status UNV


Alteplase, Recombinant (Cathflo For Central Catheter Clearance) 4 mg 1X  ONCE 

INT CAT ;  Start 6/17/20 at 09:15;  Stop 6/17/20 at 09:16;  Status UNV


Alteplase, Recombinant 4 mg/ Sodium Chloride 20 ml @ 20 mls/hr 1X  ONCE IV  Last

administered on 6/17/20at 10:10;  Start 6/17/20 at 10:00;  Stop 6/17/20 at 

10:59;  Status DC


Alteplase, Recombinant 4 mg/ Sodium Chloride 20 ml @ 20 mls/hr 1X  ONCE IV  Last

administered on 6/17/20at 10:09;  Start 6/17/20 at 10:00;  Stop 6/17/20 at 

10:59;  Status DC


Alteplase, Recombinant 4 mg/ Sodium Chloride 20 ml @ 20 mls/hr 1X  ONCE IV  Last

administered on 6/17/20at 10:09;  Start 6/17/20 at 10:00;  Stop 6/17/20 at 

10:59;  Status DC


Potassium Acetate 60 meq/Magnesium Sulfate 10 meq/ Multivitamins 10 ml/Chromium/

Copper/Manganese/ Seleni/Zn 1 ml/ Insulin Human Regular 20 unit/ Total 

Parenteral Nutrition/Amino Acids/Dextrose/ Fat Emulsion Intravenous 1,920 ml @  

80 mls/hr TPN  CONT IV  Last administered on 6/17/20at 21:55;  Start 6/17/20 at 

22:00;  Stop 6/18/20 at 21:59;  Status DC


Albumin Human 500 ml @  125 mls/hr 1X  ONCE IV  Last administered on 6/18/20at 

12:01;  Start 6/18/20 at 11:15;  Stop 6/18/20 at 15:14;  Status DC


Sodium Chloride 500 ml @  500 mls/hr 1X  ONCE IV  Last administered on 6/18/20at

13:50;  Start 6/18/20 at 11:15;  Stop 6/18/20 at 12:14;  Status DC


Potassium Acetate 60 meq/Magnesium Sulfate 14 meq/ Multivitamins 10 ml/Chromium/

Copper/Manganese/ Seleni/Zn 1 ml/ Insulin Human Regular 20 unit/ Total 

Parenteral Nutrition/Amino Acids/Dextrose/ Fat Emulsion Intravenous 1,920 ml @  

80 mls/hr TPN  CONT IV  Last administered on 6/18/20at 22:26;  Start 6/18/20 at 

22:00;  Stop 6/19/20 at 21:59;  Status DC


Ciprofloxacin/ Dextrose 200 ml @  200 mls/hr Q12HR IV  Last administered on 6/25 /20at 08:27;  Start 6/18/20 at 21:00;  Stop 6/25/20 at 08:56;  Status DC


Albumin Human 250 ml @  62.5 mls/hr 1X  ONCE IV  Last administered on 6/19/20at 

11:09;  Start 6/19/20 at 11:00;  Stop 6/19/20 at 14:59;  Status DC


Furosemide (Lasix) 20 mg 1X  ONCE IVP  Last administered on 6/19/20at 14:52;  

Start 6/19/20 at 10:45;  Stop 6/19/20 at 10:49;  Status DC


Potassium Acetate 60 meq/Magnesium Sulfate 14 meq/ Multivitamins 10 ml/Chromium/

Copper/Manganese/ Seleni/Zn 1 ml/ Insulin Human Regular 15 unit/ Total Par

enteral Nutrition/Amino Acids/Dextrose/ Fat Emulsion Intravenous 1,920 ml @  80 

mls/hr TPN  CONT IV  Last administered on 6/19/20at 22:08;  Start 6/19/20 at 

22:00;  Stop 6/20/20 at 21:59;  Status DC


Potassium Acetate 60 meq/Magnesium Sulfate 14 meq/ Multivitamins 10 ml/Chromium/

Copper/Manganese/ Seleni/Zn 1 ml/ Insulin Human Regular 15 unit/ Total 

Parenteral Nutrition/Amino Acids/Dextrose/ Fat Emulsion Intravenous 1,920 ml @  

80 mls/hr TPN  CONT IV  Last administered on 6/20/20at 22:12;  Start 6/20/20 at 

22:00;  Stop 6/21/20 at 21:59;  Status DC


Potassium Acetate 60 meq/Magnesium Sulfate 14 meq/ Multivitamins 10 ml/Chromium/

Copper/Manganese/ Seleni/Zn 1 ml/ Insulin Human Regular 15 unit/ Total 

Parenteral Nutrition/Amino Acids/Dextrose/ Fat Emulsion Intravenous 1,920 ml @  

80 mls/hr TPN  CONT IV  Last administered on 6/21/20at 22:22;  Start 6/21/20 at 

22:00;  Stop 6/22/20 at 21:59;  Status DC


Furosemide (Lasix) 20 mg 1X  ONCE IVP  Last administered on 6/22/20at 11:07;  

Start 6/22/20 at 10:30;  Stop 6/22/20 at 10:34;  Status DC


Potassium Acetate 60 meq/Magnesium Sulfate 14 meq/ Multivitamins 10 ml/Chromium/

Copper/Manganese/ Seleni/Zn 1 ml/ Insulin Human Regular 15 unit/ Sodium Chloride

20 meq/Total Parenteral Nutrition/Amino Acids/Dextrose/ Fat Emulsion Intravenous

1,920 ml @  80 mls/hr TPN  CONT IV  Last administered on 6/22/20at 21:54;  Start

6/22/20 at 22:00;  Stop 6/23/20 at 21:59;  Status DC


Potassium Acetate 30 meq/Magnesium Sulfate 14 meq/ Multivitamins 10 ml/Chromium/

Copper/Manganese/ Seleni/Zn 1 ml/ Insulin Human Regular 15 unit/ Sodium Chloride

20 meq/Potassium Chloride 30 meq/ Total Parenteral Nutrition/Amino 

Acids/Dextrose/ Fat Emulsion Intravenous 1,920 ml @  80 mls/hr TPN  CONT IV  

Last administered on 6/23/20at 21:46;  Start 6/23/20 at 22:00;  Stop 6/24/20 at 

21:59;  Status DC


Sodium Chloride 80 meq/Potassium Chloride 30 meq/ Potassium Acetate 30 

meq/Magnesium Sulfate 14 meq/ Multivitamins 10 ml/Chromium/ Copper/Manganese/ 

Seleni/Zn 1 ml/ Insulin Human Regular 15 unit/ Total Parenteral Nutrition/Amino 

Acids/Dextrose/ Fat Emulsion Intravenous 1,920 ml @  80 mls/hr TPN  CONT IV  

Last administered on 6/24/20at 22:33;  Start 6/24/20 at 22:00;  Stop 6/25/20 at 

21:59;  Status DC


Furosemide (Lasix) 40 mg 1X  ONCE IVP  Last administered on 6/24/20at 16:27;  

Start 6/24/20 at 15:30;  Stop 6/24/20 at 15:33;  Status DC


Albumin Human 250 ml @  62.5 mls/hr 1X  ONCE IV  Last administered on 6/24/20at 

16:27;  Start 6/24/20 at 15:30;  Stop 6/24/20 at 19:29;  Status DC


Sodium Chloride 80 meq/Potassium Chloride 30 meq/ Potassium Acetate 30 

meq/Magnesium Sulfate 14 meq/ Multivitamins 10 ml/Chromium/ Copper/Manganese/ 

Seleni/Zn 1 ml/ Insulin Human Regular 15 unit/ Total Parenteral Nutrition/Amino 

Acids/Dextrose/ Fat Emulsion Intravenous 1,920 ml @  80 mls/hr TPN  CONT IV  

Last administered on 6/25/20at 22:25;  Start 6/25/20 at 22:00;  Stop 6/26/20 at 

21:59;  Status DC


Sodium Chloride 80 meq/Potassium Chloride 30 meq/ Potassium Acetate 30 

meq/Magnesium Sulfate 14 meq/ Multivitamins 10 ml/Chromium/ Copper/Manganese/ 

Seleni/Zn 1 ml/ Insulin Human Regular 15 unit/ Total Parenteral Nutrition/Amino 

Acids/Dextrose/ Fat Emulsion Intravenous 1,920 ml @  80 mls/hr TPN  CONT IV  

Last administered on 6/26/20at 21:32;  Start 6/26/20 at 22:00;  Stop 6/27/20 at 

21:59;  Status DC


Sodium Chloride 80 meq/Potassium Chloride 30 meq/ Potassium Acetate 30 

meq/Magnesium Sulfate 14 meq/ Multivitamins 10 ml/Chromium/ Copper/Manganese/ 

Seleni/Zn 1 ml/ Insulin Human Regular 15 unit/ Total Parenteral Nutrition/Amino 

Acids/Dextrose/ Fat Emulsion Intravenous 1,920 ml @  80 mls/hr TPN  CONT IV  

Last administered on 6/27/20at 21:53;  Start 6/27/20 at 22:00;  Stop 6/28/20 at 

21:59;  Status DC


Acetylcysteine (Mucomyst 20% Resp Treatment) 600 mg RTBID NEB  Last administered

on 7/18/20at 08:00;  Start 6/27/20 at 12:00


Sodium Chloride 80 meq/Potassium Chloride 30 meq/ Potassium Acetate 30 

meq/Magnesium Sulfate 14 meq/ Multivitamins 10 ml/Chromium/ Copper/Manganese/ 

Seleni/Zn 1 ml/ Insulin Human Regular 15 unit/ Total Parenteral Nutrition/Amino 

Acids/Dextrose/ Fat Emulsion Intravenous 1,920 ml @  80 mls/hr TPN  CONT IV  

Last administered on 6/28/20at 22:06;  Start 6/28/20 at 22:00;  Stop 6/29/20 at 

21:59;  Status DC


Meropenem 500 mg/ Sodium Chloride 50 ml @  100 mls/hr Q6HRS IV  Last 

administered on 7/18/20at 05:26;  Start 6/28/20 at 18:00


Daptomycin 500 mg/ Sodium Chloride 50 ml @  100 mls/hr Q24H IV  Last 

administered on 7/6/20at 21:47;  Start 6/28/20 at 19:00;  Stop 7/7/20 at 08:13; 

Status DC


Sodium Chloride 80 meq/Potassium Chloride 30 meq/ Potassium Acetate 30 

meq/Magnesium Sulfate 14 meq/ Multivitamins 10 ml/Chromium/ Copper/Manganese/ 

Seleni/Zn 1 ml/ Insulin Human Regular 15 unit/ Total Parenteral Nutrition/Amino 

Acids/Dextrose/ Fat Emulsion Intravenous 1,920 ml @  80 mls/hr TPN  CONT IV  

Last administered on 6/29/20at 22:09;  Start 6/29/20 at 22:00;  Stop 6/30/20 at 

21:59;  Status DC


Heparin Sodium (Porcine) 1000 unit/Sodium Chloride 1,001 ml @  1,001 mls/hr 1X  

ONCE IRR ;  Start 6/30/20 at 06:00;  Stop 6/30/20 at 06:59;  Status DC


Propofol (Diprivan) 200 mg STK-MED ONCE IV ;  Start 6/30/20 at 07:44;  Stop 

6/30/20 at 07:44;  Status DC


Lidocaine HCl (Lidocaine Pf 2% Vial) 5 ml STK-MED ONCE .ROUTE ;  Start 6/30/20 

at 07:44;  Stop 6/30/20 at 07:44;  Status DC


Fentanyl Citrate (Fentanyl 2ml Vial) 100 mcg STK-MED ONCE .ROUTE ;  Start 

6/30/20 at 07:44;  Stop 6/30/20 at 07:44;  Status DC


Rocuronium Bromide (Zemuron) 100 mg STK-MED ONCE .ROUTE ;  Start 6/30/20 at 

07:44;  Stop 6/30/20 at 07:44;  Status DC


Micafungin Sodium 100 mg/Dextrose 100 ml @  100 mls/hr Q24H IV  Last 

administered on 7/18/20at 09:37;  Start 6/30/20 at 08:30


Bupivacaine HCl/ Epinephrine Bitart (Sensorcain-Epi 0.5%-1:[id number] Mpf) 30 ml 

STK-MED ONCE .ROUTE ;  Start 6/30/20 at 08:34;  Stop 6/30/20 at 08:35;  Status 

DC


Iohexol (Omnipaque 300 Mg/ml) 50 ml STK-MED ONCE .ROUTE  Last administered on 

6/30/20at 13:30;  Start 6/30/20 at 08:35;  Stop 6/30/20 at 08:35;  Status DC


Sodium Chloride 80 meq/Potassium Chloride 30 meq/ Potassium Acetate 30 

meq/Magnesium Sulfate 14 meq/ Multivitamins 10 ml/Chromium/ Copper/Manganese/ 

Seleni/Zn 1 ml/ Insulin Human Regular 15 unit/ Total Parenteral Nutrition/Amino 

Acids/Dextrose/ Fat Emulsion Intravenous 1,920 ml @  80 mls/hr TPN  CONT IV  

Last administered on 7/1/20at 01:22;  Start 6/30/20 at 22:00;  Stop 7/1/20 at 

21:59;  Status DC


Phenylephrine HCl (Ken-Synephrine Inj) 10 mg STK-MED ONCE .ROUTE ;  Start 

6/30/20 at 10:15;  Stop 6/30/20 at 10:15;  Status DC


Desflurane (Suprane) 90 ml STK-MED ONCE IH ;  Start 6/30/20 at 10:18;  Stop 

6/30/20 at 10:19;  Status DC


Albumin Human 500 ml @  As Directed STK-MED ONCE IV ;  Start 6/30/20 at 11:06;  

Stop 6/30/20 at 11:06;  Status DC


Vasopressin (Vasostrict) 20 unit STK-MED ONCE .ROUTE ;  Start 6/30/20 at 12:23; 

Stop 6/30/20 at 12:23;  Status DC


Phenylephrine HCl (Ken-Synephrine Inj) 10 mg STK-MED ONCE .ROUTE ;  Start 

6/30/20 at 13:33;  Stop 6/30/20 at 13:33;  Status DC


Phenylephrine HCl (Ken-Synephrine Inj) 10 mg STK-MED ONCE .ROUTE ;  Start 

6/30/20 at 13:33;  Stop 6/30/20 at 13:33;  Status DC


Ondansetron HCl (Zofran) 4 mg STK-MED ONCE .ROUTE ;  Start 6/30/20 at 13:33;  

Stop 6/30/20 at 13:33;  Status DC


Enoxaparin Sodium (Lovenox 40mg Syringe) 40 mg Q24H SQ  Last administered on 

7/18/20at 09:35;  Start 7/1/20 at 08:00


Sodium Chloride (Normal Saline Flush) 3 ml QSHIFT  PRN IV AFTER MEDS AND BLOOD 

DRAWS;  Start 6/30/20 at 14:45


Naloxone HCl (Narcan) 0.4 mg PRN Q2MIN  PRN IV SEE INSTRUCTIONS;  Start 6/30/20 

at 14:45


Sodium Chloride 1,000 ml @  25 mls/hr Q24H IV  Last administered on 7/16/20at 

14:33;  Start 6/30/20 at 14:33


Morphine Sulfate (Morphine Sulfate) 1 mg PRN Q1HR  PRN IV PAIN;  Start 6/30/20 

at 14:45


Midazolam HCl 100 mg/Sodium Chloride 100 ml @ 1 mls/hr CONT  PRN IV SEE I/O 

RECORD Last administered on 7/3/20at 18:48;  Start 6/30/20 at 14:45


Phenylephrine HCl (PHENYLEPHRINE in 0.9% NACL PF) 1 mg STK-MED ONCE IV ;  Start 

6/30/20 at 14:44;  Stop 6/30/20 at 14:45;  Status DC


Ephedrine Sulfate (ePHEDrine PF IN SALINE SYRINGE) 50 mg STK-MED ONCE IV ;  

Start 6/30/20 at 14:45;  Stop 6/30/20 at 14:45;  Status DC


Vasopressin 20 unit/Dextrose 101 ml @  12 mls/hr CONT  PRN IV SEE I/O RECORD 

Last administered on 7/7/20at 04:17;  Start 6/30/20 at 15:30


Sodium Chloride 1,000 ml @  1,000 mls/hr 1X  ONCE IV  Last administered on 

6/30/20at 15:42;  Start 6/30/20 at 15:45;  Stop 6/30/20 at 16:44;  Status DC


Albumin Human 500 ml @  125 mls/hr 1X  ONCE IV ;  Start 6/30/20 at 16:00;  Stop 

6/30/20 at 19:59;  Status DC


Albumin Human 500 ml @  125 mls/hr PRN Q1HR  PRN IV PER PROTOCOL;  Start 6/30/20

at 15:45


Magnesium Sulfate 50 ml @ 25 mls/hr 1X  ONCE IV  Last administered on 6/30/20at 

17:02;  Start 6/30/20 at 16:30;  Stop 6/30/20 at 18:29;  Status DC


Sodium Bicarbonate (Sodium Bicarb Adult 8.4% Syr) 50 meq STK-MED ONCE .ROUTE ;  

Start 6/30/20 at 16:20;  Stop 6/30/20 at 16:20;  Status DC


Sodium Bicarbonate (Sodium Bicarb Adult 8.4% Syr) 100 meq 1X  ONCE IV  Last 

administered on 6/30/20at 17:07;  Start 6/30/20 at 16:30;  Stop 6/30/20 at 

16:31;  Status DC


Sodium Bicarbonate 150 meq/Dextrose 1,150 ml @  75 mls/hr 1X  ONCE IV  Last 

administered on 6/30/20at 20:02;  Start 6/30/20 at 16:30;  Stop 7/1/20 at 07:49;

 Status DC


Sodium Chloride 80 meq/Potassium Chloride 30 meq/ Potassium Acetate 30 

meq/Magnesium Sulfate 14 meq/ Multivitamins 10 ml/Chromium/ Copper/Manganese/ 

Seleni/Zn 1 ml/ Insulin Human Regular 15 unit/ Total Parenteral Nutrition/Amino 

Acids/Dextrose/ Fat Emulsion Intravenous 1,920 ml @  80 mls/hr TPN  CONT IV  

Last administered on 7/1/20at 23:05;  Start 7/1/20 at 22:00;  Stop 7/2/20 at 

21:59;  Status DC


Sodium Chloride 100 meq/Potassium Chloride 30 meq/ Potassium Acetate 30 

meq/Magnesium Sulfate 12 meq/ Multivitamins 10 ml/Chromium/ Copper/Manganese/ 

Seleni/Zn 1 ml/ Insulin Human Regular 15 unit/ Total Parenteral Nutrition/Amino 

Acids/Dextrose/ Fat Emulsion Intravenous 1,920 ml @  80 mls/hr TPN  CONT IV  L

ast administered on 7/2/20at 21:52;  Start 7/2/20 at 22:00;  Stop 7/3/20 at 

21:59;  Status DC


Sodium Chloride 100 meq/Potassium Chloride 30 meq/ Potassium Acetate 30 

meq/Magnesium Sulfate 12 meq/ Multivitamins 10 ml/Chromium/ Copper/Manganese/ 

Seleni/Zn 1 ml/ Insulin Human Regular 15 unit/ Total Parenteral Nutrition/Amino 

Acids/Dextrose/ Fat Emulsion Intravenous 1,920 ml @  80 mls/hr TPN  CONT IV  

Last administered on 7/3/20at 21:46;  Start 7/3/20 at 22:00;  Stop 7/4/20 at 

21:59;  Status DC


Sodium Chloride 100 meq/Potassium Chloride 30 meq/ Potassium Acetate 30 

meq/Magnesium Sulfate 12 meq/ Multivitamins 10 ml/Chromium/ Copper/Manganese/ 

Seleni/Zn 1 ml/ Insulin Human Regular 15 unit/ Total Parenteral Nutrition/Amino 

Acids/Dextrose/ Fat Emulsion Intravenous 1,800 ml @  75 mls/hr TPN  CONT IV  

Last administered on 7/4/20at 22:04;  Start 7/4/20 at 22:00;  Stop 7/5/20 at 

21:59;  Status DC


Fentanyl Citrate 55 ml @ 0 mls/hr CONT  PRN IV SEE COMMENTS Last administered on

7/6/20at 23:55;  Start 7/4/20 at 13:00;  Stop 7/9/20 at 17:28;  Status DC


Sodium Chloride 100 meq/Potassium Chloride 30 meq/ Potassium Acetate 30 

meq/Magnesium Sulfate 12 meq/ Multivitamins 10 ml/Chromium/ Copper/Manganese/ 

Seleni/Zn 1 ml/ Insulin Human Regular 15 unit/ Total Parenteral Nutrition/Amino 

Acids/Dextrose/ Fat Emulsion Intravenous 1,680 ml @  70 mls/hr TPN  CONT IV  

Last administered on 7/5/20at 21:23;  Start 7/5/20 at 22:00;  Stop 7/6/20 at 

21:59;  Status DC


Sodium Chloride 110 meq/Potassium Chloride 30 meq/ Potassium Acetate 30 

meq/Magnesium Sulfate 15 meq/ Multivitamins 10 ml/Chromium/ Copper/Manganese/ 

Seleni/Zn 1 ml/ Insulin Human Regular 15 unit/ Total Parenteral Nutrition/Amino 

Acids/Dextrose/ Fat Emulsion Intravenous 1,680 ml @  70 mls/hr TPN  CONT IV  

Last administered on 7/6/20at 21:48;  Start 7/6/20 at 22:00;  Stop 7/7/20 at 

21:59;  Status DC


Sodium Chloride 110 meq/Potassium Chloride 30 meq/ Potassium Acetate 30 

meq/Magnesium Sulfate 15 meq/ Multivitamins 10 ml/Chromium/ Copper/Manganese/ 

Seleni/Zn 1 ml/ Insulin Human Regular 15 unit/ Total Parenteral Nutrition/Amino 

Acids/Dextrose/ Fat Emulsion Intravenous 1,680 ml @  70 mls/hr TPN  CONT IV  

Last administered on 7/7/20at 21:33;  Start 7/7/20 at 22:00;  Stop 7/8/20 at 

21:59;  Status DC


Sodium Chloride 110 meq/Potassium Chloride 30 meq/ Potassium Acetate 30 

meq/Magnesium Sulfate 15 meq/ Multivitamins 10 ml/Chromium/ Copper/Manganese/ 

Seleni/Zn 1 ml/ Insulin Human Regular 15 unit/ Total Parenteral Nutrition/Amino 

Acids/Dextrose/ Fat Emulsion Intravenous 1,680 ml @  70 mls/hr TPN  CONT IV  

Last administered on 7/8/20at 21:51;  Start 7/8/20 at 22:00;  Stop 7/9/20 at 

21:59;  Status DC


Sodium Chloride 90 meq/Potassium Chloride 30 meq/ Potassium Acetate 30 

meq/Magnesium Sulfate 15 meq/ Multivitamins 10 ml/Chromium/ Copper/Manganese/ 

Seleni/Zn 1 ml/ Insulin Human Regular 15 unit/ Total Parenteral Nutrition/Amino 

Acids/Dextrose/ Fat Emulsion Intravenous 1,680 ml @  70 mls/hr TPN  CONT IV  

Last administered on 7/9/20at 22:38;  Start 7/9/20 at 22:00;  Stop 7/10/20 at 

21:59;  Status DC


Fentanyl Citrate 30 ml @ 0 mls/hr CONT  PRN IV SEE I/O RECORD;  Start 7/9/20 at 

17:30


Fentanyl (Duragesic 12mcg/ Hr Patch) 1 patch Q3DAYS TD  Last administered on 

7/16/20at 08:27;  Start 7/10/20 at 09:00


Sodium Chloride 90 meq/Potassium Chloride 30 meq/ Potassium Acetate 30 

meq/Magnesium Sulfate 15 meq/ Multivitamins 10 ml/Chromium/ Copper/Manganese/ 

Seleni/Zn 1 ml/ Insulin Human Regular 15 unit/ Total Parenteral Nutrition/Amino 

Acids/Dextrose/ Fat Emulsion Intravenous 1,680 ml @  70 mls/hr TPN  CONT IV  

Last administered on 7/10/20at 21:59;  Start 7/10/20 at 22:00;  Stop 7/11/20 at 

21:59;  Status DC


Sodium Chloride 90 meq/Potassium Chloride 30 meq/ Potassium Acetate 30 

meq/Magnesium Sulfate 15 meq/ Multivitamins 10 ml/Chromium/ Copper/Manganese/ 

Seleni/Zn 1 ml/ Insulin Human Regular 15 unit/ Total Parenteral Nutrition/Amino 

Acids/Dextrose/ Fat Emulsion Intravenous 1,680 ml @  70 mls/hr TPN  CONT IV  

Last administered on 7/11/20at 21:35;  Start 7/11/20 at 22:00;  Stop 7/12/20 at 

21:59;  Status DC


Vancomycin HCl (Vanco Per Pharmacy) 1 each PRN DAILY  PRN MC SEE COMMENTS Last 

administered on 7/14/20at 02:46;  Start 7/12/20 at 09:15;  Stop 7/15/20 at 

07:41;  Status DC


Ciprofloxacin/ Dextrose 200 ml @  200 mls/hr Q12HR IV  Last administered on 

7/18/20at 09:37;  Start 7/12/20 at 10:00


Vancomycin HCl 2 gm/Sodium Chloride 500 ml @  250 mls/hr 1X  ONCE IV  Last 

administered on 7/12/20at 10:34;  Start 7/12/20 at 10:00;  Stop 7/12/20 at 

11:59;  Status DC


Sodium Chloride 90 meq/Potassium Chloride 30 meq/ Potassium Acetate 30 meq/M

agnesium Sulfate 15 meq/ Multivitamins 10 ml/Chromium/ Copper/Manganese/ 

Seleni/Zn 1 ml/ Insulin Human Regular 15 unit/ Total Parenteral Nutrition/Amino 

Acids/Dextrose/ Fat Emulsion Intravenous 1,680 ml @  70 mls/hr TPN  CONT IV  

Last administered on 7/12/20at 22:02;  Start 7/12/20 at 22:00;  Stop 7/13/20 at 

21:59;  Status DC


Diphenhydramine HCl (Benadryl) 25 mg 1X  ONCE IVP  Last administered on 

7/12/20at 14:26;  Start 7/12/20 at 14:30;  Stop 7/12/20 at 14:31;  Status DC


Vancomycin HCl 1.5 gm/Sodium Chloride 500 ml @  250 mls/hr Q8H IV  Last 

administered on 7/13/20at 03:08;  Start 7/12/20 at 18:30;  Stop 7/13/20 at 

12:24;  Status DC


Vancomycin HCl (Vancomycin Trough Level) 1 each 1X  ONCE MC  Last administered 

on 7/13/20at 10:00;  Start 7/13/20 at 10:00;  Stop 7/13/20 at 10:01;  Status DC


Sodium Chloride 90 meq/Potassium Chloride 30 meq/ Potassium Acetate 30 

meq/Magnesium Sulfate 15 meq/ Multivitamins 10 ml/Chromium/ Copper/Manganese/ 

Seleni/Zn 1 ml/ Insulin Human Regular 15 unit/ Total Parenteral Nutrition/Amino 

Acids/Dextrose/ Fat Emulsion Intravenous 1,680 ml @  70 mls/hr TPN  CONT IV  

Last administered on 7/13/20at 22:13;  Start 7/13/20 at 22:00;  Stop 7/14/20 at 

21:59;  Status DC


Vancomycin HCl (Vancomycin Random Level) 1 each 1X  ONCE MC  Last administered 

on 7/14/20at 01:00;  Start 7/14/20 at 01:00;  Stop 7/14/20 at 01:01;  Status DC


Vancomycin HCl 1.5 gm/Sodium Chloride 500 ml @  250 mls/hr Q12H IV  Last 

administered on 7/14/20at 22:07;  Start 7/14/20 at 10:00;  Stop 7/15/20 at 

07:41;  Status DC


Vancomycin HCl (Vancomycin Trough Level) 1 each 1X  ONCE MC ;  Start 7/15/20 at 

09:30;  Stop 7/15/20 at 09:31;  Status Cancel


Sodium Chloride 90 meq/Potassium Chloride 30 meq/ Potassium Acetate 30 

meq/Magnesium Sulfate 15 meq/ Multivitamins 10 ml/Chromium/ Copper/Manganese/ 

Seleni/Zn 1 ml/ Insulin Human Regular 15 unit/ Total Parenteral Nutrition/Amino 

Acids/Dextrose/ Fat Emulsion Intravenous 1,680 ml @  70 mls/hr TPN  CONT IV  

Last administered on 7/14/20at 22:08;  Start 7/14/20 at 22:00;  Stop 7/15/20 at 

21:59;  Status DC


Alteplase, Recombinant (Cathflo For Central Catheter Clearance) 1 mg 1X  ONCE 

INT CAT  Last administered on 7/14/20at 11:49;  Start 7/14/20 at 11:00;  Stop 

7/14/20 at 11:01;  Status DC


Daptomycin 500 mg/ Sodium Chloride 50 ml @  100 mls/hr Q24H IV  Last 

administered on 7/18/20at 09:33;  Start 7/15/20 at 09:00


Sodium Chloride 90 meq/Potassium Chloride 30 meq/ Potassium Acetate 30 

meq/Magnesium Sulfate 15 meq/ Multivitamins 10 ml/Chromium/ Copper/Manganese/ 

Seleni/Zn 1 ml/ Insulin Human Regular 15 unit/ Total Parenteral Nutrition/Amino 

Acids/Dextrose/ Fat Emulsion Intravenous 1,680 ml @  70 mls/hr TPN  CONT IV  

Last administered on 7/15/20at 22:55;  Start 7/15/20 at 22:00;  Stop 7/16/20 at 

21:59;  Status DC


Sodium Chloride 90 meq/Potassium Chloride 30 meq/ Potassium Acetate 30 

meq/Magnesium Sulfate 15 meq/ Multivitamins 10 ml/Chromium/ Copper/Manganese/ 

Seleni/Zn 1 ml/ Insulin Human Regular 15 unit/ Total Parenteral Nutrition/Amino 

Acids/Dextrose/ Fat Emulsion Intravenous 1,680 ml @  70 mls/hr TPN  CONT IV  

Last administered on 7/16/20at 22:06;  Start 7/16/20 at 22:00;  Stop 7/17/20 at 

21:59;  Status DC


Diphenhydramine HCl (Benadryl) 50 mg STK-MED ONCE .ROUTE ;  Start 7/16/20 at 

18:34;  Stop 7/16/20 at 18:35;  Status DC


Diphenhydramine HCl (Benadryl) 25 mg 1X  ONCE IM ;  Start 7/16/20 at 18:45;  

Stop 7/16/20 at 18:46;  Status DC


Diphenhydramine HCl (Benadryl) 25 mg 1X  ONCE IVP  Last administered on 

7/16/20at 18:56;  Start 7/16/20 at 19:00;  Stop 7/16/20 at 19:01;  Status DC


Alprazolam (Xanax) 0.5 mg PRN TID  PRN PO ANXIETY / AGITATION Last administered 

on 7/18/20at 09:34;  Start 7/17/20 at 08:00


Sodium Chloride 110 meq/Potassium Chloride 30 meq/ Potassium Acetate 30 

meq/Magnesium Sulfate 15 meq/ Multivitamins 10 ml/Chromium/ Copper/Manganese/ 

Seleni/Zn 1 ml/ Insulin Human Regular 15 unit/ Total Parenteral Nutrition/Amino 

Acids/Dextrose/ Fat Emulsion Intravenous 1,680 ml @  70 mls/hr TPN  CONT IV  

Last administered on 7/17/20at 21:21;  Start 7/17/20 at 22:00;  Stop 7/18/20 at 

21:59





Active Scripts


Active


Reported


Bisoprolol Fumarate 5 Mg Tablet 10 Mg PO DAILY


Vitals/I & O





Vital Sign - Last 24 Hours








 7/17/20 7/17/20 7/17/20 7/17/20





 11:00 11:59 12:00 12:00


 


Pulse 120   140


 


Resp 30   30


 


B/P (MAP) 161/105 (123)   168/114 (132)


 


Pulse Ox 99 98  99


 


O2 Delivery Tracheal Collar Tracheal Collar Trach Collar Tracheal Collar


 


O2 Flow Rate  8.0 8.0 





 7/17/20 7/17/20 7/17/20 7/17/20





 12:13 12:43 13:00 14:00


 


Temp   99.1 





   99.1 


 


Pulse   140 120


 


Resp   30 30


 


B/P (MAP)   170/107 (128) 170/104 (126)


 


Pulse Ox 98 98 99 99


 


O2 Delivery   Tracheal Collar Tracheal Collar


 


O2 Flow Rate 8.0 8.0  


 


    





    





 7/17/20 7/17/20 7/17/20 7/17/20





 15:00 16:00 16:00 16:18


 


Temp   98.8 





   98.8 


 


Resp 30  30 


 


B/P (MAP) 172/104 (126)   


 


Pulse Ox 99  99 98


 


O2 Delivery Tracheal Collar Trach Collar Tracheal Collar Tracheal Collar


 


O2 Flow Rate  8.0  8.0


 


    





    





 7/17/20 7/17/20 7/17/20 7/17/20





 17:00 18:00 18:08 18:38


 


Pulse  130  


 


Resp 30 30  


 


B/P (MAP)  212/123 (152)  


 


Pulse Ox 99 99 99 100


 


O2 Delivery Tracheal Collar Tracheal Collar  


 


O2 Flow Rate   8.0 8.0





 7/17/20 7/17/20 7/17/20 7/17/20





 19:00 20:00 20:00 20:13


 


Temp 98.7   





 98.7   


 


Pulse 128 127  


 


Resp 30 31  


 


B/P (MAP) 187/123 (144) 160/85 (110)  


 


Pulse Ox 98 100  100


 


O2 Delivery Tracheal Collar Tracheal Collar Trach Collar Tracheal Collar


 


O2 Flow Rate   8.0 8.0


 


    





    





 7/17/20 7/17/20 7/17/20 7/18/20





 21:00 22:00 23:00 00:00


 


Temp    98.6





    98.6


 


Pulse 126 123 120 116


 


Resp 31 30 30 30


 


B/P (MAP) 165/109 (127) 152/92 (112) 151/99 (116) 151/99 (116)


 


Pulse Ox 100 98 95 99


 


O2 Delivery Tracheal Collar Tracheal Collar Tracheal Collar Tracheal Collar


 


    





    





 7/18/20 7/18/20 7/18/20 7/18/20





 00:10 00:10 01:00 02:00


 


Pulse   119 117


 


Resp   30 28


 


B/P (MAP)   158/92 (114) 155/92 (113)


 


Pulse Ox 98  99 99


 


O2 Delivery Tracheal Collar Trach Collar Tracheal Collar Tracheal Collar


 


O2 Flow Rate 8.0 8.0  





 7/18/20 7/18/20 7/18/20 7/18/20





 03:00 04:00 04:11 04:12


 


Pulse 114 110  


 


Resp 24 24  


 


B/P (MAP) 153/88 (109) 154/97 (116)  


 


Pulse Ox 99 100  98


 


O2 Delivery Tracheal Collar Tracheal Collar Trach Collar Tracheal Collar


 


O2 Flow Rate   8.0 8.0





 7/18/20 7/18/20 7/18/20 





 05:00 06:00 08:53 


 


Temp 98.5   





 98.5   


 


Pulse 108 113  


 


Resp 24 24  


 


B/P (MAP) 135/82 (99) 135/93 (107)  


 


Pulse Ox 99 97 98 


 


O2 Delivery Tracheal Collar Tracheal Collar Tracheal Collar 


 


O2 Flow Rate   8.0 














Intake and Output   


 


 7/17/20 7/17/20 7/18/20





 15:00 23:00 07:00


 


Intake Total  1126 ml 729 ml


 


Output Total 700 ml 2380 ml 1625 ml


 


Balance -700 ml -1254 ml -896 ml











Justicifation of Admission Dx:


Justifications for Admission:


Justification of Admission Dx:  Yes











GRAEME MASSEY MD             Jul 18, 2020 10:22

## 2020-07-18 NOTE — NUR
During assessment, RN noticed R lower richardson drain to be completely out- dark green drainage 
coming from drain site. Dr. Summers in room, viewed drains, and made aware of drain coming 
out. Vaseline gauze, 4x4s applied, pt tolerated well. Will monitor drainage, all other 
drains still intact.

## 2020-07-18 NOTE — PDOC
Infectious Disease Note


Subjective


Subjective


No fevers last 24 hrs 


FiO2 35% trach shield 


TPN





ROS


ROS


unobtainable





Vital Sign


Vital Signs





Vital Signs








  Date Time  Temp Pulse Resp B/P (MAP) Pulse Ox O2 Delivery O2 Flow Rate FiO2


 


7/18/20 06:00  113 24 135/93 (107) 97 Tracheal Collar  


 


7/18/20 05:00 98.5       





 98.5       


 


7/18/20 04:12       8.0 











Physical Exam


PHYSICAL EXAM


GENERAL: Propped up in bed, awake, weak appearing 


HEENT: Pupils equal, oral cavity dry. NGT out, 


NECK:  Tracheostomy 


LUNGS: Diminished aeration bases,  CT on left 


HEART:  S1, S2, regular 


ABDOMEN: Sightly less distended, bowel sounds hypoactive, soft, richardson x 2, 3 ROBERT

drains, G-J tube and + wound vac 


: [place] in place 


EXTREMITIES: Less generalized edema, no cyanosis. Podus boots bilaterally, 


SKIN: warm touch. No signs of rash.  


NEURO: Awake, not responding to questons 


LUE-PICC without signs of complications





Labs


Lab





Laboratory Tests








Test


 7/17/20


12:14 7/17/20


17:57 7/18/20


05:45


 


Glucose (Fingerstick)


 138 mg/dL


(70-99) 130 mg/dL


(70-99) 





 


White Blood Count


 


 


 12.2 x10^3/uL


(4.0-11.0)


 


Red Blood Count


 


 


 2.77 x10^6/uL


(3.50-5.40)


 


Hemoglobin


 


 


 7.8 g/dL


(12.0-15.5)


 


Hematocrit


 


 


 23.9 %


(36.0-47.0)


 


Mean Corpuscular Volume   86 fL () 


 


Mean Corpuscular Hemoglobin   28 pg (25-35) 


 


Mean Corpuscular Hemoglobin


Concent 


 


 33 g/dL


(31-37)


 


Red Cell Distribution Width


 


 


 15.2 %


(11.5-14.5)


 


Platelet Count


 


 


 666 x10^3/uL


(140-400)


 


Neutrophils (%) (Auto)   70 % (31-73) 


 


Lymphocytes (%) (Auto)   12 % (24-48) 


 


Monocytes (%) (Auto)   13 % (0-9) 


 


Eosinophils (%) (Auto)   4 % (0-3) 


 


Basophils (%) (Auto)   1 % (0-3) 


 


Neutrophils # (Auto)


 


 


 8.6 x10^3/uL


(1.8-7.7)


 


Lymphocytes # (Auto)


 


 


 1.5 x10^3/uL


(1.0-4.8)


 


Monocytes # (Auto)


 


 


 1.6 x10^3/uL


(0.0-1.1)


 


Eosinophils # (Auto)


 


 


 0.5 x10^3/uL


(0.0-0.7)


 


Basophils # (Auto)


 


 


 0.1 x10^3/uL


(0.0-0.2)


 


Sodium Level


 


 


 134 mmol/L


(136-145)


 


Potassium Level


 


 


 4.2 mmol/L


(3.5-5.1)


 


Chloride Level


 


 


 101 mmol/L


()


 


Carbon Dioxide Level


 


 


 30 mmol/L


(21-32)


 


Anion Gap   3 (6-14) 


 


Blood Urea Nitrogen


 


 


 11 mg/dL


(7-20)


 


Creatinine


 


 


 0.6 mg/dL


(0.6-1.0)


 


Estimated GFR


(Cockcroft-Gault) 


 


 106.3 





 


Glucose Level


 


 


 138 mg/dL


(70-99)


 


Calcium Level


 


 


 9.5 mg/dL


(8.5-10.1)








Micro


6/28. BLOOD CULTURE  Preliminary  


        NO GROWTH AFTER 5 DAYS                                 








6/30.  GRAM STAIN  Final  


        Final





        SQUAMOUS EPI CELL:RARE


        PMN (WBCs):FEW


        YEAST:FEW


        


 ANAEROBIC-AEROBIC CULTURE  Preliminary  


        Preliminary





       ANAEROBIC-AEROBIC CULTURE  Preliminary  


        Preliminary





        MANY YEAST on 07/03/20 at 1354


        FINAL ID= [NAHID PARAPSILOSIS]


        FEW


        FINAL ID= [PSEUDOMONAS AERUGINOSA]


        NAHID PARAPSILOSIS


        PSEUDOMONAS AERUGINOSA





Objective


Assessment


Patient with prolonged hospitalization more than 3 months


Multiple medical problems


Multiple surgical procedures





S/P Exp. 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 

parapsilosis & PSAE (I-merrem/ceftazidime/AZT/cefepime)


Leucocytosis -trending upward 


Fever 


Acute gallstone pancreatitis with persistent necrosis


  - 4/9.  CT A/P Increased ascites. Persistent evidence of necrotizing 

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


  - 4/27. status post ROBERT drain placement; C. parapsilosis. s/p drain 5/6 + yeast

& high amylase; s/p additional drain on 5/8. Drains removed. 


  -5/6. fluid  candida parapsilosis fluid, amylase high


  - 6/6 showed multiple pseudocysts, slight larger on the right. s/p drains x 3,

6/7.  + PSAE (MDRO-R Cefepime, Zosyn ANSON < 64) and yeast, 


  -6/7 s/p drain replacement x 3; fluid cult PSAE (MDRO), yeast; treated


  -7/12 CT A/P shows smaller fluid collections.         


Ascites s/p paracentesis 4/15 & 5/6. C. parapsilosis 


Cholelithiasis with thickening of the gallbladder wall.


JED, Hyperkalemia, Metabolic acidosis off dialysis


Acute hypoxic resp failure. trach/vent. sputum 6/13 + PSAE (I merrem). Repeat 

sputum on 7/12 + CRE PSA


Pleural effusions s/p left thoracentesis, 5/12. no culture. s/p left chest tube,

6/15 no growth


Hypocalcemia 


Prediabetes


HTN


Anemia s/p PRBCs





Plan


Plan of Care


Meropenem, cipro (7/12), micafungin and dapto


BC from 7/12 negto date 


Monitor WBC/temp 


Wound care /drain management as directed


Contact isolation for CRE/MDRO


D/w nursing 





Critically ill





Long term prognosis poor


Attending Co-Sign


The patient was seen and interviewed as well as examined at the bedside. The 

chart was reviewed. The case was discussed. Agree with the plan of care.











HAVEN WINTERS        Jul 18, 2020 07:54


LINN FRANZ MD               Jul 18, 2020 13:01

## 2020-07-18 NOTE — NUR
Pharmacy TPN Dosing Note



S: JESENIA BEAN is a 49 year old F Currently receiving Central Continuous TPN started 
03/18/20



B:Pertinent PMH: 

Necrotizing pancreatitis

Height: 5 feet, 8 inches

Weight: 88.4 kg



Current diet: NPO 



LABS:

Sodium:    134 

Potassium: 4.2 

Chloride:  101 

Calcium:   9.5 

Corrected Calcium: 11.82 

Magnesium: 2.1 

CO2:       30 

SCr:       0.6 

Glucose:   138 

Albumin:   1.1 

AST:       25 

ALT:       37 



TPN FORMULA:

TPN TYPE:  Central Continuous

AMINO ACIDS:         85 gm

DEXTROSE:            250 gm

LIPIDS:              20 gm

SODIUM CHLORIDE:     110 mEq

SODIUM ACETATE:      - mEq

SODIUM PHOSPHATE:    - mmol

POTASSIUM CHLORIDE:  30 mEq

POTASSIUM ACETATE:   30 mEq

POTASSIUM PHOSPHATE: - mmol

MAGNESIUM:           15 mEq

CALCIUM:             - mEq

INSULIN:             15 units

MULTIPLE VITAMIN:    10 ml

TRACE ELEMENTS:      1 ml ml(s)



TPN PLAN:  

continue same





R: Continue TPN as ordered

Will monitor electrolytes, glucose, and tolerance to TPN.



 CHRISTIANNE MELO [facility], 07/18/20 [id number]

## 2020-07-19 VITALS — SYSTOLIC BLOOD PRESSURE: 119 MMHG | DIASTOLIC BLOOD PRESSURE: 86 MMHG

## 2020-07-19 VITALS — SYSTOLIC BLOOD PRESSURE: 140 MMHG | DIASTOLIC BLOOD PRESSURE: 81 MMHG

## 2020-07-19 VITALS — SYSTOLIC BLOOD PRESSURE: 140 MMHG | DIASTOLIC BLOOD PRESSURE: 94 MMHG

## 2020-07-19 VITALS — SYSTOLIC BLOOD PRESSURE: 128 MMHG | DIASTOLIC BLOOD PRESSURE: 98 MMHG

## 2020-07-19 VITALS — SYSTOLIC BLOOD PRESSURE: 138 MMHG | DIASTOLIC BLOOD PRESSURE: 88 MMHG

## 2020-07-19 VITALS — SYSTOLIC BLOOD PRESSURE: 135 MMHG | DIASTOLIC BLOOD PRESSURE: 88 MMHG

## 2020-07-19 VITALS — DIASTOLIC BLOOD PRESSURE: 90 MMHG | SYSTOLIC BLOOD PRESSURE: 138 MMHG

## 2020-07-19 VITALS — DIASTOLIC BLOOD PRESSURE: 97 MMHG | SYSTOLIC BLOOD PRESSURE: 134 MMHG

## 2020-07-19 VITALS — SYSTOLIC BLOOD PRESSURE: 137 MMHG | DIASTOLIC BLOOD PRESSURE: 81 MMHG

## 2020-07-19 VITALS — DIASTOLIC BLOOD PRESSURE: 86 MMHG | SYSTOLIC BLOOD PRESSURE: 136 MMHG

## 2020-07-19 VITALS — DIASTOLIC BLOOD PRESSURE: 74 MMHG | SYSTOLIC BLOOD PRESSURE: 116 MMHG

## 2020-07-19 VITALS — DIASTOLIC BLOOD PRESSURE: 84 MMHG | SYSTOLIC BLOOD PRESSURE: 128 MMHG

## 2020-07-19 VITALS — SYSTOLIC BLOOD PRESSURE: 142 MMHG | DIASTOLIC BLOOD PRESSURE: 96 MMHG

## 2020-07-19 VITALS — SYSTOLIC BLOOD PRESSURE: 136 MMHG | DIASTOLIC BLOOD PRESSURE: 85 MMHG

## 2020-07-19 VITALS — SYSTOLIC BLOOD PRESSURE: 143 MMHG | DIASTOLIC BLOOD PRESSURE: 85 MMHG

## 2020-07-19 VITALS — SYSTOLIC BLOOD PRESSURE: 126 MMHG | DIASTOLIC BLOOD PRESSURE: 83 MMHG

## 2020-07-19 VITALS — SYSTOLIC BLOOD PRESSURE: 128 MMHG | DIASTOLIC BLOOD PRESSURE: 80 MMHG

## 2020-07-19 VITALS — DIASTOLIC BLOOD PRESSURE: 76 MMHG | SYSTOLIC BLOOD PRESSURE: 128 MMHG

## 2020-07-19 VITALS — SYSTOLIC BLOOD PRESSURE: 120 MMHG | DIASTOLIC BLOOD PRESSURE: 80 MMHG

## 2020-07-19 VITALS — SYSTOLIC BLOOD PRESSURE: 118 MMHG | DIASTOLIC BLOOD PRESSURE: 69 MMHG

## 2020-07-19 VITALS — DIASTOLIC BLOOD PRESSURE: 82 MMHG | SYSTOLIC BLOOD PRESSURE: 135 MMHG

## 2020-07-19 VITALS — DIASTOLIC BLOOD PRESSURE: 93 MMHG | SYSTOLIC BLOOD PRESSURE: 146 MMHG

## 2020-07-19 VITALS — SYSTOLIC BLOOD PRESSURE: 124 MMHG | DIASTOLIC BLOOD PRESSURE: 76 MMHG

## 2020-07-19 VITALS — DIASTOLIC BLOOD PRESSURE: 85 MMHG | SYSTOLIC BLOOD PRESSURE: 134 MMHG

## 2020-07-19 LAB
ANION GAP SERPL CALC-SCNC: 4 MMOL/L (ref 6–14)
BUN SERPL-MCNC: 10 MG/DL (ref 7–20)
CALCIUM SERPL-MCNC: 9.6 MG/DL (ref 8.5–10.1)
CHLORIDE SERPL-SCNC: 102 MMOL/L (ref 98–107)
CO2 SERPL-SCNC: 30 MMOL/L (ref 21–32)
CREAT SERPL-MCNC: 0.5 MG/DL (ref 0.6–1)
GFR SERPLBLD BASED ON 1.73 SQ M-ARVRAT: 131.1 ML/MIN
GLUCOSE SERPL-MCNC: 129 MG/DL (ref 70–99)
MAGNESIUM SERPL-MCNC: 2.1 MG/DL (ref 1.8–2.4)
PHOSPHATE SERPL-MCNC: 2.6 MG/DL (ref 2.6–4.7)
POTASSIUM SERPL-SCNC: 4.2 MMOL/L (ref 3.5–5.1)
SODIUM SERPL-SCNC: 136 MMOL/L (ref 136–145)

## 2020-07-19 RX ADMIN — IPRATROPIUM BROMIDE AND ALBUTEROL SULFATE SCH ML: .5; 3 SOLUTION RESPIRATORY (INHALATION) at 20:01

## 2020-07-19 RX ADMIN — INSULIN LISPRO SCH UNITS: 100 INJECTION, SOLUTION INTRAVENOUS; SUBCUTANEOUS at 18:00

## 2020-07-19 RX ADMIN — DEXTROSE SCH MLS/HR: 50 INJECTION, SOLUTION INTRAVENOUS at 08:24

## 2020-07-19 RX ADMIN — IPRATROPIUM BROMIDE AND ALBUTEROL SULFATE SCH ML: .5; 3 SOLUTION RESPIRATORY (INHALATION) at 12:26

## 2020-07-19 RX ADMIN — INSULIN LISPRO SCH UNITS: 100 INJECTION, SOLUTION INTRAVENOUS; SUBCUTANEOUS at 12:33

## 2020-07-19 RX ADMIN — IPRATROPIUM BROMIDE AND ALBUTEROL SULFATE SCH ML: .5; 3 SOLUTION RESPIRATORY (INHALATION) at 04:10

## 2020-07-19 RX ADMIN — DAPTOMYCIN SCH MLS/HR: 500 INJECTION, POWDER, LYOPHILIZED, FOR SOLUTION INTRAVENOUS at 09:27

## 2020-07-19 RX ADMIN — MEROPENEM SCH MLS/HR: 500 INJECTION, POWDER, FOR SOLUTION INTRAVENOUS at 08:23

## 2020-07-19 RX ADMIN — BACITRACIN SCH MLS/HR: 5000 INJECTION, POWDER, FOR SOLUTION INTRAMUSCULAR at 14:33

## 2020-07-19 RX ADMIN — INSULIN LISPRO SCH UNITS: 100 INJECTION, SOLUTION INTRAVENOUS; SUBCUTANEOUS at 05:59

## 2020-07-19 RX ADMIN — INSULIN LISPRO SCH UNITS: 100 INJECTION, SOLUTION INTRAVENOUS; SUBCUTANEOUS at 00:00

## 2020-07-19 RX ADMIN — FENTANYL CITRATE PRN MCG: 50 INJECTION INTRAMUSCULAR; INTRAVENOUS at 20:04

## 2020-07-19 RX ADMIN — ACETYLCYSTEINE SCH MG: 200 INHALANT RESPIRATORY (INHALATION) at 08:41

## 2020-07-19 RX ADMIN — CIPROFLOXACIN SCH MLS/HR: 2 INJECTION, SOLUTION INTRAVENOUS at 08:24

## 2020-07-19 RX ADMIN — MEROPENEM SCH MLS/HR: 500 INJECTION, POWDER, FOR SOLUTION INTRAVENOUS at 06:00

## 2020-07-19 RX ADMIN — FENTANYL SCH PATCH: 12.5 PATCH TRANSDERMAL at 09:00

## 2020-07-19 RX ADMIN — IPRATROPIUM BROMIDE AND ALBUTEROL SULFATE SCH ML: .5; 3 SOLUTION RESPIRATORY (INHALATION) at 08:37

## 2020-07-19 RX ADMIN — CIPROFLOXACIN SCH MLS/HR: 2 INJECTION, SOLUTION INTRAVENOUS at 21:22

## 2020-07-19 RX ADMIN — FENTANYL CITRATE PRN MCG: 50 INJECTION INTRAMUSCULAR; INTRAVENOUS at 13:55

## 2020-07-19 RX ADMIN — ENOXAPARIN SODIUM SCH MG: 40 INJECTION SUBCUTANEOUS at 08:23

## 2020-07-19 RX ADMIN — PANTOPRAZOLE SODIUM SCH MG: 40 INJECTION, POWDER, FOR SOLUTION INTRAVENOUS at 08:22

## 2020-07-19 RX ADMIN — IPRATROPIUM BROMIDE AND ALBUTEROL SULFATE SCH ML: .5; 3 SOLUTION RESPIRATORY (INHALATION) at 16:28

## 2020-07-19 RX ADMIN — MEROPENEM SCH MLS/HR: 500 INJECTION, POWDER, FOR SOLUTION INTRAVENOUS at 18:23

## 2020-07-19 RX ADMIN — Medication PRN EACH: at 08:49

## 2020-07-19 RX ADMIN — Medication PRN EACH: at 08:42

## 2020-07-19 RX ADMIN — ACETYLCYSTEINE SCH MG: 200 INHALANT RESPIRATORY (INHALATION) at 20:01

## 2020-07-19 RX ADMIN — IPRATROPIUM BROMIDE AND ALBUTEROL SULFATE SCH ML: .5; 3 SOLUTION RESPIRATORY (INHALATION) at 00:15

## 2020-07-19 NOTE — PDOC
Infectious Disease Note


Subjective


Subjective


Fever Tmax 100.9  


FiO2 35% via trach shield 


TPN





ROS


ROS


as mentioned above





Vital Sign


Vital Signs





Vital Signs








  Date Time  Temp Pulse Resp B/P (MAP) Pulse Ox O2 Delivery O2 Flow Rate FiO2


 


7/19/20 07:00  116 29 140/94 (109) 100 Tracheal Collar 10.0 


 


7/19/20 04:00 97.6       





 97.6       











Physical Exam


PHYSICAL EXAM


GENERAL: Propped up in bed, resting quietly 


HEENT: Pupils equal, oral cavity dry. NGT out, 


NECK:  Tracheostomy 


LUNGS: Diminished aeration bases,  CT on left 


HEART:  S1, S2, regular, tachy 110s 


ABDOMEN: Distended, bowel sounds hypoactive, soft, richardson x 2, 3 ROBERT drains, G-J 

tube and + wound vac 


: [place] in place 


EXTREMITIES: Trace generalized edema, no cyanosis. MARTHA hose bilaterally, 


SKIN: warm touch. No signs of rash.  


NEURO: Did not awaken 


LUE-PICC without signs of complications





Labs


Lab





Laboratory Tests








Test


 7/18/20


17:47 7/19/20


00:10 7/19/20


05:53 7/19/20


06:00


 


Glucose (Fingerstick)


 96 mg/dL


(70-99) 137 mg/dL


(70-99) 120 mg/dL


(70-99) 





 


Sodium Level


 


 


 


 136 mmol/L


(136-145)


 


Potassium Level


 


 


 


 4.2 mmol/L


(3.5-5.1)


 


Chloride Level


 


 


 


 102 mmol/L


()


 


Carbon Dioxide Level


 


 


 


 30 mmol/L


(21-32)


 


Anion Gap    4 (6-14) 


 


Blood Urea Nitrogen


 


 


 


 10 mg/dL


(7-20)


 


Creatinine


 


 


 


 0.5 mg/dL


(0.6-1.0)


 


Estimated GFR


(Cockcroft-Gault) 


 


 


 131.1 





 


Glucose Level


 


 


 


 129 mg/dL


(70-99)


 


Calcium Level


 


 


 


 9.6 mg/dL


(8.5-10.1)


 


Phosphorus Level


 


 


 


 2.6 mg/dL


(2.6-4.7)


 


Magnesium Level


 


 


 


 2.1 mg/dL


(1.8-2.4)








Micro








Objective


Assessment


Patient with prolonged hospitalization more than 3 months


Multiple medical problems


Multiple surgical procedures





S/P Exp. 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 

parapsilosis & PSAE (I-merrem/ceftazidime/AZT/cefepime)


Leucocytosis -trending upward 


Fever 


Acute gallstone pancreatitis with persistent necrosis


  - 4/9.  CT A/P Increased ascites. Persistent evidence of necrotizing 

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


  - 4/27. status post ROBERT drain placement; C. parapsilosis. s/p drain 5/6 + yeast

& high amylase; s/p additional drain on 5/8. Drains removed. 


  -5/6. fluid  candida parapsilosis fluid, amylase high


  - 6/6 showed multiple pseudocysts, slight larger on the right. s/p drains x 3,

6/7.  + PSAE (MDRO-R Cefepime, Zosyn ANSON < 64) and yeast, 


  -6/7 s/p drain replacement x 3; fluid cult PSAE (MDRO), yeast; treated


  -7/12 CT A/P shows smaller fluid collections.         


Ascites s/p paracentesis 4/15 & 5/6. C. parapsilosis 


Cholelithiasis with thickening of the gallbladder wall.


JED, Hyperkalemia, Metabolic acidosis off dialysis


Acute hypoxic resp failure. trach/vent. sputum 6/13 + PSAE (I merrem). Repeat 

sputum on 7/12 + CRE PSA


Pleural effusions s/p left thoracentesis, 5/12. no culture. s/p left chest tube,

6/15 no growth


Hypocalcemia 


Prediabetes


HTN


Anemia s/p PRBCs





Plan


Plan of Care


Meropenem, cipro (7/12), micafungin and dapto


BC from 7/12 neg to date 


Resp culture pending


CPK in am 


Monitor WBC/temp 


Wound care /drain management as directed


Contact isolation for CRE/MDRO


D/w nursing 





Critically ill





Long term prognosis poor


Attending Co-Sign


The patient was seen and interviewed as well as examined at the bedside. The 

chart was reviewed. The case was discussed. Agree with the plan of care.











HAVEN WINTERS        Jul 19, 2020 07:50


LINN FRANZ MD               Jul 19, 2020 10:19

## 2020-07-19 NOTE — NUR
Pharmacy TPN Dosing Note



S: JESENIA BEAN is a 49 year old F Currently receiving Central Continuous TPN started 
03/18/20



B:Pertinent PMH: 

Necrotizing pancreatitis

Height: 5 feet, 8 inches

Weight: 90.2 kg



Current diet: NPO 



LABS:

Sodium:    136 

Potassium: 4.2 

Chloride:  102 

Calcium:   9.6 

Corrected Calcium: 11.92 

Magnesium: 2.1 

CO2:       30 

SCr:       0.5 

Glucose:   120-129 

Albumin:   1.1 

AST:       25 

ALT:       37 



TPN FORMULA:

TPN TYPE:  Central Continuous

AMINO ACIDS:         85 gm

DEXTROSE:            250 gm

LIPIDS:              20 gm

SODIUM CHLORIDE:     110 mEq

SODIUM ACETATE:      - mEq

SODIUM PHOSPHATE:    10 mmol

POTASSIUM CHLORIDE:  30 mEq

POTASSIUM ACETATE:   30 mEq

POTASSIUM PHOSPHATE: - mmol

MAGNESIUM:           15 mEq

CALCIUM:             - mEq

INSULIN:             15 units

MULTIPLE VITAMIN:    10 ml

TRACE ELEMENTS:      1 ml ml(s)



TPN PLAN:  

Added 10 mmol Naphos for low plasma phos. Other labs stable. BMP in AM.





R: Continue TPN as above.

Will monitor electrolytes, glucose, and tolerance to TPN.



 CANDACE BELTRE [facility], 07/19/20 [id number]

## 2020-07-19 NOTE — PDOC
PROGRESS NOTES


Chief Complaint


Chief Complaint


A/P


Acute hypoxic Respiratory failure required  mechanical ventilation


Tracheostomy


bilateral pleural effusions/pulm edema s/p Throacentesis on 6/15/2020


Severe Acute gallstone pancreatitis (not a surgical candidate at this time) with

necrosis


Acute kidney failure now requiring dialysis


Gallstones (Calculus of gallbladder with acute cholecystitis without 

obstruction)


HTN 


Intractable pain


Intractable nausea


Covid 19 negative. 


Acute on chronic anemia 


EEG: No seizure activity


Fever  - intermittent 


? Ileus with vomiting


Abd distention - U/S and CT reviewed s/p 0.4 L of opaque, debris-containing 

ascites was removed 5/6


Acute pancreatitis with persistent necrosis


Gallstone pancreatitis with necrosis. 


   -CT A/P 6/6 showed multiple pseudocysts, slight larger on the right. s/p 

drains x 3, 6/7.  + PSAE (MDRO-R Cefepime, Zosyn ANSON < 64) and yeast, 


   -s/p drain 4/27. C. parapsilosis. s/p drain 5/6 + yeast & high amylase; s/p 

additional drain on 5/8. Drains removed. 


Ascites s/p paracentesis 4/15 & 5/6. C. parapsilosis 


JED. off HD. 


A large fluid collection in the pancreatic bed has slightly decreased in size, 

described below, the pancreas itself is difficult to  visualize, which could be 

due to necrosis or obscuration of pancreatic  parenchyma from the surrounding 

fluid collection.6/15 


- 4/27 status post ROBERT drain placement + C paropsilosis. s/p additional drains 

5/8


Anemia - S/p PRBCs. 


Cholelithiasis with thickening of the gallbladder wall.


Leucocytosis improving


JED, hyperkalemia, Metabolic acidosis off dialysis


hypocalcemia 


Prediabetes


HTN


s/p trach


Hyperglycemia


severe protein-caloric malnutrition


Moderate to large left pleural effusion with atelectasis and collapse  of most 

of the left lower lobe, stable


 


Dispo - ICU, critically ill


Chest tube draining


TPN per nutrition 


follow cultures. 


meropenam, cipro, micafungin per ID 


Continue trach shield


ID, gen sx, GI, pulm following 


DVT GI prophylaxis


Continue TPN for nutrition support 


poor prognosis


follow labs


xanax for anxiety prn





History of Present Illness


History of Present Illness


fi 0235% via trach shield. no fevers.





Vitals


Vitals





Vital Signs








  Date Time  Temp Pulse Resp B/P (MAP) Pulse Ox O2 Delivery O2 Flow Rate FiO2


 


7/19/20 10:00  112 24 120/80 (93) 100 Tracheal Collar 10.0 


 


7/19/20 08:00 98.1       





 98.1       











Physical Exam


Physical Exam


GENERAL: Propped up in bed, resting quietly 


HEENT: Pupils equal, oral cavity dry. NGT out, 


NECK:  Tracheostomy 


LUNGS: Diminished aeration bases,  CT on left 


HEART:  S1, S2, regular, tachy 110s 


ABDOMEN: Distended, bowel sounds hypoactive, soft, richardson x 2, 3 ROBERT drains, G-J 

tube and + wound vac 


: [place] in place 


EXTREMITIES: Trace generalized edema, no cyanosis. MARTHA hose bilaterally, 


SKIN: warm touch. No signs of rash.  


NEURO: Did not awaken 


LUE-PICC without signs of complications


General:  Alert, Cooperative (place)


Heart:  Regular rate (SR/ST), Other (distant heart sounds)


Lungs:  Crackles, Other (l ct)


Abdomen:  Soft, Other (mulitple drains, lower sump drain migrated out)


Extremities:  Other (Diffuse edema)


Skin:  No rashes, No significant lesion





Labs


LABS





Laboratory Tests








Test


 7/18/20


17:47 7/19/20


00:10 7/19/20


05:53 7/19/20


06:00


 


Glucose (Fingerstick)


 96 mg/dL


(70-99) 137 mg/dL


(70-99) 120 mg/dL


(70-99) 





 


Sodium Level


 


 


 


 136 mmol/L


(136-145)


 


Potassium Level


 


 


 


 4.2 mmol/L


(3.5-5.1)


 


Chloride Level


 


 


 


 102 mmol/L


()


 


Carbon Dioxide Level


 


 


 


 30 mmol/L


(21-32)


 


Anion Gap    4 (6-14) 


 


Blood Urea Nitrogen


 


 


 


 10 mg/dL


(7-20)


 


Creatinine


 


 


 


 0.5 mg/dL


(0.6-1.0)


 


Estimated GFR


(Cockcroft-Gault) 


 


 


 131.1 





 


Glucose Level


 


 


 


 129 mg/dL


(70-99)


 


Calcium Level


 


 


 


 9.6 mg/dL


(8.5-10.1)


 


Phosphorus Level


 


 


 


 2.6 mg/dL


(2.6-4.7)


 


Magnesium Level


 


 


 


 2.1 mg/dL


(1.8-2.4)


 


Test


 7/19/20


08:50 


 


 





 


Creatine Kinase


 11 U/L


() 


 


 














Assessment and Plan


Assessmemt and Plan


Problems


Medical Problems:


(1) Acute pancreatitis


Status: Acute  





(2) Cholelithiasis


Status: Acute  











Comment


Review of Relevant


I have reviewed the following items josy (where applicable) has been applied.


Labs





Laboratory Tests








Test


 7/17/20


12:14 7/17/20


17:57 7/18/20


05:45 7/18/20


17:47


 


Glucose (Fingerstick)


 138 mg/dL


(70-99) 130 mg/dL


(70-99) 


 96 mg/dL


(70-99)


 


White Blood Count


 


 


 12.2 x10^3/uL


(4.0-11.0) 





 


Red Blood Count


 


 


 2.77 x10^6/uL


(3.50-5.40) 





 


Hemoglobin


 


 


 7.8 g/dL


(12.0-15.5) 





 


Hematocrit


 


 


 23.9 %


(36.0-47.0) 





 


Mean Corpuscular Volume   86 fL ()  


 


Mean Corpuscular Hemoglobin   28 pg (25-35)  


 


Mean Corpuscular Hemoglobin


Concent 


 


 33 g/dL


(31-37) 





 


Red Cell Distribution Width


 


 


 15.2 %


(11.5-14.5) 





 


Platelet Count


 


 


 666 x10^3/uL


(140-400) 





 


Neutrophils (%) (Auto)   70 % (31-73)  


 


Lymphocytes (%) (Auto)   12 % (24-48)  


 


Monocytes (%) (Auto)   13 % (0-9)  


 


Eosinophils (%) (Auto)   4 % (0-3)  


 


Basophils (%) (Auto)   1 % (0-3)  


 


Neutrophils # (Auto)


 


 


 8.6 x10^3/uL


(1.8-7.7) 





 


Lymphocytes # (Auto)


 


 


 1.5 x10^3/uL


(1.0-4.8) 





 


Monocytes # (Auto)


 


 


 1.6 x10^3/uL


(0.0-1.1) 





 


Eosinophils # (Auto)


 


 


 0.5 x10^3/uL


(0.0-0.7) 





 


Basophils # (Auto)


 


 


 0.1 x10^3/uL


(0.0-0.2) 





 


Sodium Level


 


 


 134 mmol/L


(136-145) 





 


Potassium Level


 


 


 4.2 mmol/L


(3.5-5.1) 





 


Chloride Level


 


 


 101 mmol/L


() 





 


Carbon Dioxide Level


 


 


 30 mmol/L


(21-32) 





 


Anion Gap   3 (6-14)  


 


Blood Urea Nitrogen


 


 


 11 mg/dL


(7-20) 





 


Creatinine


 


 


 0.6 mg/dL


(0.6-1.0) 





 


Estimated GFR


(Cockcroft-Gault) 


 


 106.3 


 





 


Glucose Level


 


 


 138 mg/dL


(70-99) 





 


Calcium Level


 


 


 9.5 mg/dL


(8.5-10.1) 





 


Test


 7/19/20


00:10 7/19/20


05:53 7/19/20


06:00 7/19/20


08:50


 


Glucose (Fingerstick)


 137 mg/dL


(70-99) 120 mg/dL


(70-99) 


 





 


Sodium Level


 


 


 136 mmol/L


(136-145) 





 


Potassium Level


 


 


 4.2 mmol/L


(3.5-5.1) 





 


Chloride Level


 


 


 102 mmol/L


() 





 


Carbon Dioxide Level


 


 


 30 mmol/L


(21-32) 





 


Anion Gap   4 (6-14)  


 


Blood Urea Nitrogen


 


 


 10 mg/dL


(7-20) 





 


Creatinine


 


 


 0.5 mg/dL


(0.6-1.0) 





 


Estimated GFR


(Cockcroft-Gault) 


 


 131.1 


 





 


Glucose Level


 


 


 129 mg/dL


(70-99) 





 


Calcium Level


 


 


 9.6 mg/dL


(8.5-10.1) 





 


Phosphorus Level


 


 


 2.6 mg/dL


(2.6-4.7) 





 


Magnesium Level


 


 


 2.1 mg/dL


(1.8-2.4) 





 


Creatine Kinase


 


 


 


 11 U/L


()








Laboratory Tests








Test


 7/18/20


17:47 7/19/20


00:10 7/19/20


05:53 7/19/20


06:00


 


Glucose (Fingerstick)


 96 mg/dL


(70-99) 137 mg/dL


(70-99) 120 mg/dL


(70-99) 





 


Sodium Level


 


 


 


 136 mmol/L


(136-145)


 


Potassium Level


 


 


 


 4.2 mmol/L


(3.5-5.1)


 


Chloride Level


 


 


 


 102 mmol/L


()


 


Carbon Dioxide Level


 


 


 


 30 mmol/L


(21-32)


 


Anion Gap    4 (6-14) 


 


Blood Urea Nitrogen


 


 


 


 10 mg/dL


(7-20)


 


Creatinine


 


 


 


 0.5 mg/dL


(0.6-1.0)


 


Estimated GFR


(Cockcroft-Gault) 


 


 


 131.1 





 


Glucose Level


 


 


 


 129 mg/dL


(70-99)


 


Calcium Level


 


 


 


 9.6 mg/dL


(8.5-10.1)


 


Phosphorus Level


 


 


 


 2.6 mg/dL


(2.6-4.7)


 


Magnesium Level


 


 


 


 2.1 mg/dL


(1.8-2.4)


 


Test


 7/19/20


08:50 


 


 





 


Creatine Kinase


 11 U/L


() 


 


 











Microbiology


7/15/20 Blood Culture - Preliminary, Resulted


          NO GROWTH AFTER 3 DAYS


7/12/20 Gram Stain Evaluation - Final, Complete


          


7/12/20 Respiratory Culture - Final, Complete


          


7/12/20 Antimicrobic Susceptibility - Final, Complete


          


6/30/20 Gram Stain - Final, Complete


          


6/30/20 Aerobic and Anaerobic Culture - Final, Complete


          


6/30/20 Antimicrobic Susceptibility - Final, Complete


          


6/15/20 Gram Stain - Final, Complete


          


6/15/20 Aerobic and Anaerobic Culture - Final, Complete


          


6/7/20 Urine Culture - Final, Complete


         


5/30/20 Gram Stain - Final, Complete


          


5/30/20 Aerobic Culture - Final, Complete


Medications





Current Medications


Sodium Chloride 1,000 ml @  1,000 mls/hr Q1H IV  Last administered on 3/16/20at 

03:00;  Start 3/16/20 at 03:00;  Stop 3/16/20 at 03:59;  Status DC


Ondansetron HCl (Zofran) 4 mg 1X  ONCE IVP  Last administered on 3/16/20at 

03:27;  Start 3/16/20 at 03:00;  Stop 3/16/20 at 03:01;  Status DC


Morphine Sulfate (Morphine Sulfate) 4 mg 1X  ONCE IV ;  Start 3/16/20 at 03:00; 

Stop 3/16/20 at 03:01;  Status Cancel


Ketorolac Tromethamine (Toradol 30mg Vial) 30 mg 1X  ONCE IV  Last administered 

on 3/16/20at 02:54;  Start 3/16/20 at 03:00;  Stop 3/16/20 at 03:01;  Status DC


Fentanyl Citrate (Fentanyl 2ml Vial) 25 mcg 1X  ONCE IVP  Last administered on 

3/16/20at 03:23;  Start 3/16/20 at 03:30;  Stop 3/16/20 at 03:31;  Status DC


Fentanyl Citrate (Fentanyl 2ml Vial) 100 mcg STK-MED ONCE .ROUTE ;  Start 

3/16/20 at 03:18;  Stop 3/16/20 at 03:18;  Status DC


Iohexol (Omnipaque 350 Mg/ml) 90 ml 1X  ONCE IV  Last administered on 3/16/20at 

03:25;  Start 3/16/20 at 03:30;  Stop 3/16/20 at 03:31;  Status DC


Info (CONTRAST GIVEN -- Rx MONITORING) 1 each PRN DAILY  PRN MC SEE COMMENTS;  

Start 3/16/20 at 03:30;  Stop 3/18/20 at 03:29;  Status DC


Hydromorphone HCl (Dilaudid) 0.5 mg 1X  ONCE IV  Last administered on 3/16/20at 

03:55;  Start 3/16/20 at 04:30;  Stop 3/16/20 at 04:32;  Status DC


Ondansetron HCl (Zofran) 4 mg PRN Q8HRS  PRN IV NAUSEA/VOMITING 1ST CHOICE;  

Start 3/16/20 at 05:00;  Stop 3/16/20 at 09:27;  Status DC


Morphine Sulfate (Morphine Sulfate) 2 mg PRN Q2HR  PRN IV SEVERE PAIN 7-10 Last 

administered on 3/17/20at 12:26;  Start 3/16/20 at 05:00;  Stop 3/17/20 at 

14:15;  Status DC


Sodium Chloride 1,000 ml @  125 mls/hr Q8H IV  Last administered on 3/16/20at 

20:56;  Start 3/16/20 at 05:00;  Stop 3/17/20 at 04:59;  Status DC


Hydromorphone HCl (Dilaudid) 0.5 mg PRN Q3HRS  PRN IV SEVERE PAIN 7-10 Last 

administered on 3/17/20at 10:06;  Start 3/16/20 at 05:00;  Stop 3/17/20 at 

12:01;  Status DC


Piperacillin Sod/ Tazobactam Sod 4.5 gm/Sodium Chloride 100 ml @  200 mls/hr 1X 

ONCE IV  Last administered on 3/16/20at 05:44;  Start 3/16/20 at 06:00;  Stop 

3/16/20 at 06:29;  Status DC


Ondansetron HCl (Zofran) 4 mg PRN Q4HRS  PRN IV NAUSEA/VOMITING 1ST CHOICE Last 

administered on 7/16/20at 08:18;  Start 3/16/20 at 09:30


Insulin Human Lispro (HumaLOG) 0-9 UNITS Q6HRS SQ  Last administered on 

7/18/20at 13:13;  Start 3/16/20 at 09:30


Dextrose (Dextrose 50%-Water Syringe) 12.5 gm PRN Q15MIN  PRN IV SEE COMMENTS;  

Start 3/16/20 at 09:30


Pantoprazole Sodium (PROTONIX VIAL for IV PUSH) 40 mg DAILYAC IVP  Last 

administered on 7/19/20at 08:22;  Start 3/16/20 at 11:30


Prochlorperazine Edisylate (Compazine) 10 mg PRN Q6HRS  PRN IV NAUSEA/VOMITING, 

2nd CHOICE Last administered on 7/13/20at 20:17;  Start 3/16/20 at 17:45


Atenolol (Tenormin) 100 mg DAILY PO ;  Start 3/17/20 at 09:00;  Stop 3/16/20 at 

20:08;  Status DC


Metoprolol Tartrate (Lopressor Vial) 2.5 mg Q6HRS IVP  Last administered on 

3/17/20at 05:51;  Start 3/16/20 at 20:15;  Stop 3/17/20 at 10:02;  Status DC


Metoprolol Tartrate (Lopressor Vial) 5 mg Q6HRS IVP  Last administered on 

3/26/20at 00:12;  Start 3/17/20 at 10:15;  Stop 3/28/20 at 08:48;  Status DC


Hydromorphone HCl (Dilaudid) 1 mg PRN Q3HRS  PRN IV SEVERE PAIN 7-10 Last 

administered on 3/23/20at 05:13;  Start 3/17/20 at 12:00;  Stop 3/31/20 at 

00:25;  Status DC


Lidocaine HCl (Buffered Lidocaine 1%) 3 ml STK-MED ONCE .ROUTE ;  Start 3/17/20 

at 12:55;  Stop 3/17/20 at 12:56;  Status DC


Albumin Human 500 ml @  125 mls/hr 1X  ONCE IV  Last administered on 3/17/20at 

14:33;  Start 3/17/20 at 14:30;  Stop 3/17/20 at 18:32;  Status DC


Norepinephrine Bitartrate 8 mg/ Dextrose 258 ml @  17.299 mls/ hr CONT  PRN IV 

PER PROTOCOL Last administered on 4/14/20at 12:48;  Start 3/17/20 at 15:30;  

Stop 4/17/20 at 09:19;  Status DC


Sodium Chloride 1,000 ml @  125 mls/hr Q8H IV  Last administered on 3/17/20at 

21:04;  Start 3/17/20 at 16:00;  Stop 3/18/20 at 02:42;  Status DC


Albumin Human 500 ml @  125 mls/hr PRN BID  PRN IV After every 2L NSS & BP < 

90mm Last administered on 6/30/20at 16:06;  Start 3/17/20 at 16:00;  Stop 7/3/20

at 09:30;  Status DC


Iohexol (Omnipaque 300 Mg/ml) 60 ml 1X  ONCE IV  Last administered on 3/17/20at 

17:20;  Start 3/17/20 at 17:00;  Stop 3/17/20 at 17:01;  Status DC


Info (CONTRAST GIVEN -- Rx MONITORING) 1 each PRN DAILY  PRN MC SEE COMMENTS;  

Start 3/17/20 at 17:00;  Stop 3/19/20 at 16:59;  Status DC


Meropenem 1 gm/ Sodium Chloride 100 ml @  200 mls/hr Q8HRS IV  Last administered

on 3/18/20at 05:45;  Start 3/17/20 at 20:00;  Stop 3/18/20 at 08:48;  Status DC


Furosemide (Lasix) 40 mg 1X  ONCE IVP  Last administered on 3/17/20at 22:12;  

Start 3/17/20 at 22:30;  Stop 3/17/20 at 22:31;  Status DC


Calcium Chloride 1000 mg/Sodium Chloride 110 ml @  220 mls/hr 1X  ONCE IV  Last 

administered on 3/17/20at 22:11;  Start 3/17/20 at 22:30;  Stop 3/17/20 at 

22:59;  Status DC


Albuterol Sulfate (Ventolin Neb Soln) 2.5 mg 1X  ONCE NEB  Last administered on 

3/18/20at 00:56;  Start 3/17/20 at 22:30;  Stop 3/17/20 at 22:31;  Status DC


Insulin Human Regular (HumuLIN R VIAL) 5 unit 1X  ONCE IV  Last administered on 

3/17/20at 22:14;  Start 3/17/20 at 22:30;  Stop 3/17/20 at 22:31;  Status DC


Magnesium Sulfate 50 ml @ 25 mls/hr 1X  ONCE IV  Last administered on 3/18/20at 

02:57;  Start 3/18/20 at 03:00;  Stop 3/18/20 at 04:59;  Status DC


Calcium Gluconate 1000 mg/Sodium Chloride 110 ml @  220 mls/hr 1X  ONCE IV  Last

administered on 3/18/20at 02:46;  Start 3/18/20 at 03:00;  Stop 3/18/20 at 

03:29;  Status DC


Sodium Chloride 1,000 ml @  200 mls/hr Q5H IV  Last administered on 3/18/20at 02

:46;  Start 3/18/20 at 03:00;  Stop 3/18/20 at 10:21;  Status DC


Calcium Gluconate 1000 mg/Sodium Chloride 110 ml @  220 mls/hr 1X  ONCE IV  Last

administered on 3/18/20at 03:21;  Start 3/18/20 at 03:30;  Stop 3/18/20 at 

03:59;  Status DC


Sodium Bicarbonate 50 meq/Sodium Chloride 1,050 ml @  75 mls/hr Q14H IV  Last 

administered on 3/22/20at 21:10;  Start 3/18/20 at 07:30;  Stop 3/23/20 at 

10:28;  Status DC


Calcium Gluconate 2000 mg/Sodium Chloride 120 ml @  220 mls/hr 1X  ONCE IV  Last

administered on 3/18/20at 09:05;  Start 3/18/20 at 07:30;  Stop 3/18/20 at 

08:02;  Status DC


Lidocaine HCl (Xylocaine-Mpf 1% 2ml Vial) 2 ml STK-MED ONCE .ROUTE ;  Start 

3/18/20 at 08:47;  Stop 3/18/20 at 08:47;  Status DC


Meropenem 500 mg/ Sodium Chloride 50 ml @  100 mls/hr Q12HR IV  Last 

administered on 3/23/20at 21:01;  Start 3/18/20 at 18:00;  Stop 3/24/20 at 

07:58;  Status DC


Lidocaine HCl (Buffered Lidocaine 1%) 3 ml STK-MED ONCE .ROUTE ;  Start 3/18/20 

at 09:46;  Stop 3/18/20 at 09:46;  Status DC


Lidocaine HCl (Buffered Lidocaine 1%) 6 ml 1X  ONCE INJ  Last administered on 

3/18/20at 10:26;  Start 3/18/20 at 10:15;  Stop 3/18/20 at 10:16;  Status DC


Info (Tpn Per Pharmacy) 1 each PRN DAILY  PRN MC SEE COMMENTS Last administered 

on 7/19/20at 08:49;  Start 3/18/20 at 12:00


Sodium Chloride 1,000 ml @  1,000 mls/hr Q1H PRN IV hypotension;  Start 3/18/20 

at 12:07;  Stop 3/18/20 at 18:06;  Status DC


Diphenhydramine HCl (Benadryl) 25 mg 1X PRN  PRN IV ITCHING;  Start 3/18/20 at 

12:15;  Stop 3/19/20 at 12:14;  Status DC


Diphenhydramine HCl (Benadryl) 25 mg 1X PRN  PRN IV ITCHING;  Start 3/18/20 at 

12:15;  Stop 3/19/20 at 12:14;  Status DC


Sodium Chloride 1,000 ml @  400 mls/hr Q2H30M PRN IV PATENCY;  Start 3/18/20 at 

12:07;  Stop 3/19/20 at 00:06;  Status DC


Info (PHARMACY MONITORING -- do not chart) 1 each PRN DAILY  PRN MC SEE 

COMMENTS;  Start 3/18/20 at 12:15;  Stop 3/20/20 at 08:13;  Status DC


Sodium Chloride 90 meq/Calcium Gluconate 10 meq/ Multivitamins 10 ml/Chromium/ 

Copper/Manganese/ Seleni/Zn 1 ml/ Total Parenteral Nutrition/Amino 

Acids/Dextrose/ Fat Emulsion Intravenous 55.005 ml  @ 2.292 mls/hr TPN  CONT IV 

;  Start 3/18/20 at 22:00;  Stop 3/18/20 at 12:33;  Status DC


Info (Tpn Per Pharmacy) 1 each PRN DAILY  PRN MC SEE COMMENTS;  Start 3/18/20 at

12:30;  Status UNV


Sodium Chloride 90 meq/Calcium Gluconate 10 meq/ Multivitamins 10 ml/Chromium/ 

Copper/Manganese/ Seleni/Zn 0.5 ml/ Total Parenteral Nutrition/Amino 

Acids/Dextrose/ Fat Emulsion Intravenous 1,512 ml @  63 mls/hr TPN  CONT IV  

Last administered on 3/18/20at 22:06;  Start 3/18/20 at 22:00;  Stop 3/19/20 at 

21:59;  Status DC


Calcium Carbonate/ Glycine (Tums) 500 mg PRN AFTMEALHC  PRN PO INDIGESTION;  

Start 3/18/20 at 17:45;  Stop 5/13/20 at 10:25;  Status DC


Calcium Gluconate (Calcium Gluconate) 2,000 mg 1X  ONCE IVP  Last administered 

on 3/19/20at 02:19;  Start 3/19/20 at 02:15;  Stop 3/19/20 at 02:16;  Status DC


Calcium Chloride 3000 mg/Sodium Chloride 1,030 ml @  50 mls/hr [id number] IV  Last 

administered on 3/21/20at 02:17;  Start 3/19/20 at 08:00;  Stop 3/21/20 at 1

5:23;  Status DC


Lorazepam (Ativan Inj) 1 mg PRN Q4HRS  PRN IVP ANXIETY / AGITATION, 2nd choic 

Last administered on 4/17/20at 03:51;  Start 3/19/20 at 09:00;  Stop 4/17/20 at 

09:19;  Status DC


Sodium Chloride 1,000 ml @  1,000 mls/hr Q1H PRN IV hypotension;  Start 3/19/20 

at 08:56;  Stop 3/19/20 at 14:55;  Status DC


Albumin Human 200 ml @  200 mls/hr 1X PRN  PRN IV Hypotension;  Start 3/19/20 at

09:00;  Stop 3/19/20 at 14:59;  Status DC


Diphenhydramine HCl (Benadryl) 25 mg 1X PRN  PRN IV ITCHING;  Start 3/19/20 at 

09:00;  Stop 3/20/20 at 08:59;  Status DC


Diphenhydramine HCl (Benadryl) 25 mg 1X PRN  PRN IV ITCHING;  Start 3/19/20 at 

09:00;  Stop 3/20/20 at 08:59;  Status DC


Sodium Chloride 1,000 ml @  400 mls/hr Q2H30M PRN IV PATENCY;  Start 3/19/20 at 

08:56;  Stop 3/19/20 at 20:55;  Status DC


Info (PHARMACY MONITORING -- do not chart) 1 each PRN DAILY  PRN MC SEE 

COMMENTS;  Start 3/19/20 at 09:00;  Status UNV


Info (PHARMACY MONITORING -- do not chart) 1 each PRN DAILY  PRN MC SEE 

COMMENTS;  Start 3/19/20 at 09:00;  Stop 3/20/20 at 08:13;  Status DC


Digoxin (Lanoxin) 500 mcg 1X  ONCE IV  Last administered on 3/19/20at 10:04;  

Start 3/19/20 at 10:00;  Stop 3/19/20 at 10:01;  Status DC


Digoxin (Lanoxin) 125 mcg 1X  ONCE IV  Last administered on 3/19/20at 17:10;  

Start 3/19/20 at 18:00;  Stop 3/19/20 at 18:01;  Status DC


Magnesium Sulfate 100 ml @  25 mls/hr 1X  ONCE IV  Last administered on 

3/19/20at 12:48;  Start 3/19/20 at 13:00;  Stop 3/19/20 at 16:59;  Status DC


Sodium Chloride 90 meq/Magnesium Sulfate 10 meq/ Calcium Gluconate 20 meq/ 

Multivitamins 10 ml/Chromium/ Copper/Manganese/ Seleni/Zn 0.5 ml/ Total 

Parenteral Nutrition/Amino Acids/Dextrose/ Fat Emulsion Intravenous 1,512 ml @  

63 mls/hr TPN  CONT IV  Last administered on 3/19/20at 22:25;  Start 3/19/20 at 

22:00;  Stop 3/20/20 at 21:59;  Status DC


Sodium Chloride 1,000 ml @  1,000 mls/hr Q1H PRN IV hypotension;  Start 3/20/20 

at 08:05;  Stop 3/20/20 at 14:04;  Status DC


Albumin Human 200 ml @  200 mls/hr 1X  ONCE IV  Last administered on 3/20/20at 

08:57;  Start 3/20/20 at 08:15;  Stop 3/20/20 at 09:14;  Status DC


Diphenhydramine HCl (Benadryl) 25 mg 1X PRN  PRN IV ITCHING;  Start 3/20/20 at 

08:15;  Stop 3/21/20 at 08:14;  Status DC


Diphenhydramine HCl (Benadryl) 25 mg 1X PRN  PRN IV ITCHING;  Start 3/20/20 at 

08:15;  Stop 3/21/20 at 08:14;  Status DC


Sodium Chloride 1,000 ml @  400 mls/hr Q2H30M PRN IV PATENCY;  Start 3/20/20 at 

08:05;  Stop 3/20/20 at 20:04;  Status DC


Info (PHARMACY MONITORING -- do not chart) 1 each PRN DAILY  PRN MC SEE 

COMMENTS;  Start 3/20/20 at 08:15;  Stop 3/24/20 at 07:57;  Status DC


Sodium Chloride 90 meq/Potassium Chloride 15 meq/ Potassium Phosphate 10 mmol/ 

Magnesium Sulfate 10 meq/Calcium Gluconate 20 meq/ Multivitamins 10 ml/Chromium/

Copper/Manganese/ Seleni/Zn 0.5 ml/ Total Parenteral Nutrition/Amino 

Acids/Dextrose/ Fat Emulsion Intravenous 1,512 ml @  63 mls/hr TPN  CONT IV  

Last administered on 3/20/20at 21:01;  Start 3/20/20 at 22:00;  Stop 3/21/20 at 

21:59;  Status DC


Potassium Chloride/Water 100 ml @  100 mls/hr 1X  ONCE IV  Last administered on 

3/20/20at 14:09;  Start 3/20/20 at 14:00;  Stop 3/20/20 at 14:59;  Status DC


Benzocaine (Hurricaine One) 1 spray 1X  ONCE MM  Last administered on 3/20/20at 

16:38;  Start 3/20/20 at 14:30;  Stop 3/20/20 at 14:31;  Status DC


Lidocaine HCl (Glydo (Lidocaine) Jelly) 1 thomas 1X  ONCE MM  Last administered on 

3/20/20at 16:38;  Start 3/20/20 at 14:30;  Stop 3/20/20 at 14:31;  Status DC


Linezolid/Dextrose 300 ml @  300 mls/hr Q12HR IV  Last administered on 3/26/20at

21:04;  Start 3/20/20 at 20:00;  Stop 3/27/20 at 07:50;  Status DC


Acetaminophen (Tylenol) 650 mg PRN Q6HRS  PRN PO MILD PAIN / TEMP;  Start 

3/21/20 at 03:30;  Stop 3/21/20 at 03:36;  Status DC


Acetaminophen (Tylenol) 650 mg PRN Q6HRS  PRN PEG MILD PAIN / TEMP Last 

administered on 4/16/20at 19:56;  Start 3/21/20 at 03:36;  Stop 5/13/20 at 

10:25;  Status DC


Sodium Chloride 1,000 ml @  1,000 mls/hr Q1H PRN IV hypotension;  Start 3/21/20 

at 07:50;  Stop 3/21/20 at 13:49;  Status DC


Albumin Human 200 ml @  200 mls/hr 1X PRN  PRN IV Hypotension;  Start 3/21/20 at

08:00;  Stop 3/21/20 at 13:59;  Status DC


Sodium Chloride (Normal Saline Flush) 10 ml 1X PRN  PRN IV AP catheter pack;  

Start 3/21/20 at 08:00;  Stop 3/22/20 at 07:59;  Status DC


Sodium Chloride (Normal Saline Flush) 10 ml 1X PRN  PRN IV  catheter pack;  

Start 3/21/20 at 08:00;  Stop 3/22/20 at 07:59;  Status DC


Sodium Chloride 1,000 ml @  400 mls/hr Q2H30M PRN IV PATENCY;  Start 3/21/20 at 

07:50;  Stop 3/21/20 at 19:49;  Status DC


Info (PHARMACY MONITORING -- do not chart) 1 each PRN DAILY  PRN MC SEE 

COMMENTS;  Start 3/21/20 at 08:00;  Status UNV


Info (PHARMACY MONITORING -- do not chart) 1 each PRN DAILY  PRN MC SEE 

COMMENTS;  Start 3/21/20 at 08:00;  Stop 3/23/20 at 08:25;  Status DC


Sodium Chloride 90 meq/Potassium Chloride 15 meq/ Potassium Phosphate 10 mmol/ 

Magnesium Sulfate 10 meq/Calcium Gluconate 20 meq/ Multivitamins 10 ml/Chromium/

Copper/Manganese/ Seleni/Zn 0.5 ml/ Total Parenteral Nutrition/Amino 

Acids/Dextrose/ Fat Emulsion Intravenous 1,512 ml @  63 mls/hr TPN  CONT IV  

Last administered on 3/21/20at 20:57;  Start 3/21/20 at 22:00;  Stop 3/22/20 at 

21:59;  Status DC


Sodium Chloride 90 meq/Potassium Chloride 15 meq/ Potassium Phosphate 15 mmol/ 

Magnesium Sulfate 10 meq/Calcium Gluconate 20 meq/ Multivitamins 10 ml/Chromium/

Copper/Manganese/ Seleni/Zn 0.5 ml/ Total Parenteral Nutrition/Amino 

Acids/Dextrose/ Fat Emulsion Intravenous 1,512 ml @  63 mls/hr TPN  CONT IV ;  

Start 3/22/20 at 22:00;  Stop 3/22/20 at 14:16;  Status DC


Sodium Chloride 90 meq/Potassium Chloride 15 meq/ Potassium Phosphate 15 mmol/ 

Magnesium Sulfate 10 meq/Calcium Gluconate 20 meq/ Multivitamins 10 ml/Chromium/

Copper/Manganese/ Seleni/Zn 0.5 ml/ Total Parenteral Nutrition/Amino 

Acids/Dextrose/ Fat Emulsion Intravenous 1,200 ml @  50 mls/hr TPN  CONT IV ;  

Start 3/22/20 at 22:00;  Stop 3/22/20 at 14:17;  Status DC


Sodium Chloride 90 meq/Potassium Chloride 15 meq/ Potassium Phosphate 10 mmol/ 

Magnesium Sulfate 10 meq/Calcium Gluconate 20 meq/ Multivitamins 10 ml/Chromium/

Copper/Manganese/ Seleni/Zn 0.5 ml/ Total Parenteral Nutrition/Amino 

Acids/Dextrose/ Fat Emulsion Intravenous 1,200 ml @  50 mls/hr TPN  CONT IV  Las

t administered on 3/22/20at 23:29;  Start 3/22/20 at 22:00;  Stop 3/23/20 at 

21:59;  Status DC


Sodium Chloride 1,000 ml @  1,000 mls/hr Q1H PRN IV hypotension;  Start 3/23/20 

at 07:28;  Stop 3/23/20 at 13:27;  Status DC


Albumin Human 200 ml @  200 mls/hr 1X  ONCE IV  Last administered on 3/23/20at 

08:51;  Start 3/23/20 at 07:30;  Stop 3/23/20 at 08:29;  Status DC


Diphenhydramine HCl (Benadryl) 25 mg 1X PRN  PRN IV ITCHING;  Start 3/23/20 at 

07:30;  Stop 3/24/20 at 07:29;  Status DC


Diphenhydramine HCl (Benadryl) 25 mg 1X PRN  PRN IV ITCHING;  Start 3/23/20 at 

07:30;  Stop 3/24/20 at 07:29;  Status DC


Sodium Chloride 1,000 ml @  400 mls/hr Q2H30M PRN IV PATENCY;  Start 3/23/20 at 

07:28;  Stop 3/23/20 at 19:27;  Status DC


Info (PHARMACY MONITORING -- do not chart) 1 each PRN DAILY  PRN MC SEE 

COMMENTS;  Start 3/23/20 at 07:30;  Stop 4/3/20 at 13:01;  Status DC


Metronidazole 100 ml @  100 mls/hr Q6HRS IV  Last administered on 4/8/20at 

06:26;  Start 3/23/20 at 08:30;  Stop 4/8/20 at 09:58;  Status DC


Micafungin Sodium 100 mg/Dextrose 100 ml @  100 mls/hr Q24H IV  Last 

administered on 4/30/20at 08:18;  Start 3/23/20 at 09:00;  Stop 4/30/20 at 

20:58;  Status DC


Propofol 0 ml @ As Directed STK-MED ONCE IV ;  Start 3/23/20 at 07:53;  Stop 

3/23/20 at 07:53;  Status DC


Etomidate (Amidate) 20 mg STK-MED ONCE IV ;  Start 3/23/20 at 07:53;  Stop 

3/23/20 at 07:54;  Status DC


Midazolam HCl (Versed) 5 mg STK-MED ONCE .ROUTE ;  Start 3/23/20 at 07:57;  Stop

3/23/20 at 07:57;  Status DC


Fentanyl Citrate 30 ml @ 0 mls/hr CONT  PRN IV SEE PROTOCOL Last administered on

4/17/20at 06:12;  Start 3/23/20 at 08:15;  Stop 4/17/20 at 09:19;  Status DC


Artificial Tears (Artificial Tears) 1 drop PRN Q1HR  PRN OU DRY EYE, 1st choice;

 Start 3/23/20 at 08:15;  Stop 4/29/20 at 05:31;  Status DC


Midazolam HCl 50 mg/Sodium Chloride 50 ml @ 0 mls/hr CONT  PRN IV SEE PROTOCOL 

Last administered on 3/26/20at 22:39;  Start 3/23/20 at 08:15;  Stop 3/28/20 at 

15:59;  Status DC


Etomidate (Amidate) 8 mg 1X  ONCE IV  Last administered on 3/23/20at 08:33;  S

tart 3/23/20 at 08:30;  Stop 3/23/20 at 08:31;  Status DC


Succinylcholine Chloride (Anectine) 120 mg 1X  ONCE IV  Last administered on 

3/23/20at 08:34;  Start 3/23/20 at 08:30;  Stop 3/23/20 at 08:31;  Status DC


Midazolam HCl (Versed) 5 mg 1X  ONCE IV ;  Start 3/23/20 at 08:30;  Stop 3/23/20

at 08:31;  Status DC


Potassium Chloride 15 meq/ Bicarbonate Dialysis Soln w/ out KCl 5,007.5 ml  @ 

1,000 mls/ hr Q5H1M IV  Last administered on 3/24/20at 11:11;  Start 3/23/20 at 

12:00;  Stop 3/24/20 at 11:15;  Status DC


Potassium Chloride 15 meq/ Bicarbonate Dialysis Soln w/ out KCl 5,007.5 ml  @ 

1,000 mls/ hr Q5H1M IV  Last administered on 3/24/20at 11:12;  Start 3/23/20 at 

12:00;  Stop 3/24/20 at 11:17;  Status DC


Potassium Chloride 15 meq/ Bicarbonate Dialysis Soln w/ out KCl 5,007.5 ml  @ 

1,000 mls/ hr Q5H1M IV  Last administered on 3/24/20at 11:11;  Start 3/23/20 at 

12:00;  Stop 3/24/20 at 11:19;  Status DC


Sodium Chloride 90 meq/Potassium Chloride 15 meq/ Potassium Phosphate 10 mmol/ 

Magnesium Sulfate 10 meq/Calcium Gluconate 20 meq/ Multivitamins 10 ml/Chromium/

Copper/Manganese/ Seleni/Zn 0.5 ml/ Total Parenteral Nutrition/Amino Acids/Dex

trose/ Fat Emulsion Intravenous 1,400 ml @  58.333 mls/ hr TPN  CONT IV  Last 

administered on 3/23/20at 21:42;  Start 3/23/20 at 22:00;  Stop 3/24/20 at 

21:59;  Status DC


Heparin Sodium (Porcine) (Heparin Sodium) 5,000 unit Q8HRS SQ  Last administered

on 3/28/20at 05:55;  Start 3/23/20 at 15:00;  Stop 3/28/20 at 13:28;  Status DC


Meropenem 500 mg/ Sodium Chloride 50 ml @  100 mls/hr Q6HRS IV  Last 

administered on 3/25/20at 06:00;  Start 3/24/20 at 09:00;  Stop 3/25/20 at 

07:29;  Status DC


Potassium Phosphate 20 mmol/ Sodium Chloride 106.6667 ml @  51.667 m... 1X  ONCE

IV  Last administered on 3/24/20at 11:22;  Start 3/24/20 at 10:15;  Stop 3/24/20

at 12:18;  Status DC


Acetaminophen (Tylenol Supp) 650 mg PRN Q6HRS  PRN IN MILD PAIN / TEMP > 100.3'F

Last administered on 7/18/20at 21:21;  Start 3/24/20 at 10:30


Potassium Chloride/Water 100 ml @  100 mls/hr Q1H IV  Last administered on 3

/24/20at 12:12;  Start 3/24/20 at 11:00;  Stop 3/24/20 at 12:59;  Status DC


Potassium Chloride 20 meq/ Bicarbonate Dialysis Soln w/ out KCl 5,010 ml @  

1,000 mls/hr Q5H1M IV  Last administered on 3/25/20at 08:48;  Start 3/24/20 at 

12:00;  Stop 3/25/20 at 13:03;  Status DC


Potassium Chloride 20 meq/ Bicarbonate Dialysis Soln w/ out KCl 5,010 ml @  

1,000 mls/hr Q5H1M IV  Last administered on 3/29/20at 14:52;  Start 3/24/20 at 

11:30;  Stop 3/29/20 at 19:59;  Status DC


Potassium Chloride 20 meq/ Bicarbonate Dialysis Soln w/ out KCl 5,010 ml @  

1,000 mls/hr Q5H1M IV  Last administered on 3/29/20at 14:53;  Start 3/24/20 at 

11:30;  Stop 3/29/20 at 19:59;  Status DC


Sodium Chloride 90 meq/Potassium Chloride 15 meq/ Potassium Phosphate 15 mmol/ 

Magnesium Sulfate 10 meq/Calcium Gluconate 15 meq/ Multivitamins 10 ml/Chromium/

Copper/Manganese/ Seleni/Zn 0.5 ml/ Total Parenteral Nutrition/Amino 

Acids/Dextrose/ Fat Emulsion Intravenous 1,400 ml @  58.333 mls/ hr TPN  CONT IV

 Last administered on 3/24/20at 22:17;  Start 3/24/20 at 22:00;  Stop 3/25/20 at

21:59;  Status DC


Cefepime HCl (Maxipime) 2 gm Q12HR IVP  Last administered on 4/7/20at 20:56;  

Start 3/25/20 at 09:00;  Stop 4/8/20 at 09:58;  Status DC


Daptomycin 500 mg/ Sodium Chloride 50 ml @  100 mls/hr Q48H IV  Last 

administered on 4/10/20at 09:57;  Start 3/25/20 at 08:30;  Stop 4/10/20 at 

10:07;  Status DC


Lidocaine HCl (Buffered Lidocaine 1%) 3 ml 1X  ONCE INJ  Last administered on 

3/25/20at 10:27;  Start 3/25/20 at 10:30;  Stop 3/25/20 at 10:31;  Status DC


Potassium Phosphate 20 mmol/ Sodium Chloride 106.6667 ml @  51.667 m... 1X  ONCE

IV  Last administered on 3/25/20at 12:51;  Start 3/25/20 at 13:00;  Stop 3/25/20

at 15:03;  Status DC


Sodium Chloride 90 meq/Potassium Chloride 15 meq/ Potassium Phosphate 18 mmol/ M

agnesium Sulfate 8 meq/Calcium Gluconate 15 meq/ Multivitamins 10 ml/Chromium/ 

Copper/Manganese/ Seleni/Zn 0.5 ml/ Total Parenteral Nutrition/Amino 

Acids/Dextrose/ Fat Emulsion Intravenous 1,400 ml @  58.333 mls/ hr TPN  CONT IV

 Last administered on 3/25/20at 22:16;  Start 3/25/20 at 22:00;  Stop 3/26/20 at

21:59;  Status DC


Potassium Chloride 20 meq/ Bicarbonate Dialysis Soln w/ out KCl 5,010 ml @  

1,000 mls/hr Q5H1M IV  Last administered on 3/29/20at 14:54;  Start 3/25/20 at 

16:00;  Stop 3/29/20 at 19:59;  Status DC


Multi-Ingred Cream/Lotion/Oil/ Oint (Artificial Tears Eye Ointment) 1 thomas PRN 

Q1HR  PRN OU DRY EYE, 2nd choice Last administered on 4/13/20at 08:19;  Start 

3/25/20 at 17:30;  Stop 6/3/20 at 14:39;  Status DC


Sodium Chloride 90 meq/Potassium Chloride 15 meq/ Potassium Phosphate 18 mmol/ 

Magnesium Sulfate 8 meq/Calcium Gluconate 15 meq/ Multivitamins 10 ml/Chromium/ 

Copper/Manganese/ Seleni/Zn 0.5 ml/ Total Parenteral Nutrition/Amino 

Acids/Dextrose/ Fat Emulsion Intravenous 1,400 ml @  58.333 mls/ hr TPN  CONT IV

 Last administered on 3/26/20at 22:00;  Start 3/26/20 at 22:00;  Stop 3/27/20 at

21:59;  Status DC


Albumin Human 500 ml @  125 mls/hr 1X  ONCE IV ;  Start 3/26/20 at 14:15;  Stop 

3/26/20 at 18:14;  Status DC


Sodium Chloride 90 meq/Potassium Chloride 15 meq/ Potassium Phosphate 18 mmol/ 

Magnesium Sulfate 8 meq/Calcium Gluconate 15 meq/ Multivitamins 10 ml/Chromium/ 

Copper/Manganese/ Seleni/Zn 0.5 ml/ Insulin Human Regular 10 unit/ Total 

Parenteral Nutrition/Amino Acids/Dextrose/ Fat Emulsion Intravenous 1,400 ml @  

58.333 mls/ hr TPN  CONT IV  Last administered on 3/27/20at 21:43;  Start 

3/27/20 at 22:00;  Stop 3/28/20 at 21:59;  Status DC


Lidocaine HCl (Buffered Lidocaine 1%) 3 ml STK-MED ONCE .ROUTE ;  Start 3/25/20 

at 10:00;  Stop 3/27/20 at 13:57;  Status DC


Midazolam HCl 100 mg/Sodium Chloride 100 ml @ 7 mls/hr CONT  PRN IV SEE PROTOCOL

Last administered on 4/8/20at 15:35;  Start 3/28/20 at 16:00;  Stop 6/3/20 at 

14:38;  Status DC


Sodium Chloride 90 meq/Potassium Chloride 15 meq/ Potassium Phosphate 18 mmol/ 

Magnesium Sulfate 8 meq/Calcium Gluconate 15 meq/ Multivitamins 10 ml/Chromium/ 

Copper/Manganese/ Seleni/Zn 0.5 ml/ Insulin Human Regular 15 unit/ Total 

Parenteral Nutrition/Amino Acids/Dextrose/ Fat Emulsion Intravenous 1,400 ml @  

58.333 mls/ hr TPN  CONT IV  Last administered on 3/28/20at 20:34;  Start 

3/28/20 at 22:00;  Stop 3/29/20 at 21:59;  Status DC


Info ([facility] Electrolyte Protocol) 1 ea CONT PRN  PRN MC PER PROTOCOL;  Start 

3/29/20 at 13:15


Sodium Chloride 90 meq/Potassium Chloride 15 meq/ Potassium Phosphate 18 mmol/ 

Magnesium Sulfate 8 meq/Calcium Gluconate 15 meq/ Multivitamins 10 ml/Chromium/ 

Copper/Manganese/ Seleni/Zn 0.5 ml/ Insulin Human Regular 15 unit/ Total 

Parenteral Nutrition/Amino Acids/Dextrose/ Fat Emulsion Intravenous 1,400 ml @  

58.333 mls/ hr TPN  CONT IV  Last administered on 3/29/20at 22:05;  Start 

3/29/20 at 22:00;  Stop 3/30/20 at 21:59;  Status DC


Potassium Chloride 15 meq/ Bicarbonate Dialysis Soln w/ out KCl 5,007.5 ml  @ 

1,000 mls/ hr Q5H1M IV  Last administered on 4/1/20at 18:14;  Start 3/29/20 at 

20:00;  Stop 4/2/20 at 13:08;  Status DC


Potassium Chloride 15 meq/ Bicarbonate Dialysis Soln w/ out KCl 5,007.5 ml  @ 

1,000 mls/ hr Q5H1M IV  Last administered on 4/1/20at 18:14;  Start 3/29/20 at 

20:00;  Stop 4/2/20 at 13:08;  Status DC


Potassium Chloride 15 meq/ Bicarbonate Dialysis Soln w/ out KCl 5,007.5 ml  @ 

1,000 mls/ hr Q5H1M IV  Last administered on 4/1/20at 18:14;  Start 3/29/20 at 

20:00;  Stop 4/2/20 at 13:08;  Status DC


Iohexol (Omnipaque 240 Mg/ml) 30 ml 1X  ONCE PO  Last administered on 3/30/20at 

11:30;  Start 3/30/20 at 11:30;  Stop 3/30/20 at 11:33;  Status DC


Info (CONTRAST GIVEN -- Rx MONITORING) 1 each PRN DAILY  PRN MC SEE COMMENTS;  

Start 3/30/20 at 11:45;  Stop 4/1/20 at 11:44;  Status DC


Sodium Chloride 90 meq/Potassium Chloride 15 meq/ Potassium Phosphate 18 mmol/ 

Magnesium Sulfate 8 meq/Calcium Gluconate 15 meq/ Multivitamins 10 ml/Chromium/ 

Copper/Manganese/ Seleni/Zn 0.5 ml/ Insulin Human Regular 15 unit/ Total 

Parenteral Nutrition/Amino Acids/Dextrose/ Fat Emulsion Intravenous 1,400 ml @  

58.333 mls/ hr TPN  CONT IV  Last administered on 3/30/20at 21:47;  Start 

3/30/20 at 22:00;  Stop 3/31/20 at 21:59;  Status DC


Sodium Chloride 90 meq/Potassium Chloride 15 meq/ Potassium Phosphate 18 mmol/ 

Magnesium Sulfate 8 meq/Calcium Gluconate 15 meq/ Multivitamins 10 ml/Chromium/ 

Copper/Manganese/ Seleni/Zn 0.5 ml/ Insulin Human Regular 20 unit/ Total 

Parenteral Nutrition/Amino Acids/Dextrose/ Fat Emulsion Intravenous 1,400 ml @  

58.333 mls/ hr TPN  CONT IV  Last administered on 3/31/20at 21:36;  Start 

3/31/20 at 22:00;  Stop 4/1/20 at 21:59;  Status DC


Alteplase, Recombinant (Cathflo For Central Catheter Clearance) 1 mg 1X  ONCE 

INT CAT  Last administered on 3/31/20at 20:03;  Start 3/31/20 at 19:30;  Stop 

3/31/20 at 19:46;  Status DC


Alteplase, Recombinant (Cathflo For Central Catheter Clearance) 1 mg 1X  ONCE 

INT CAT  Last administered on 3/31/20at 22:05;  Start 3/31/20 at 22:00;  Stop 

3/31/20 at 22:01;  Status DC


Sodium Chloride 90 meq/Potassium Chloride 15 meq/ Potassium Phosphate 18 mmol/ 

Magnesium Sulfate 8 meq/Calcium Gluconate 15 meq/ Multivitamins 10 ml/Chromium/ 

Copper/Manganese/ Seleni/Zn 0.5 ml/ Insulin Human Regular 20 unit/ Total 

Parenteral Nutrition/Amino Acids/Dextrose/ Fat Emulsion Intravenous 1,400 ml @  

58.333 mls/ hr TPN  CONT IV  Last administered on 4/1/20at 21:30;  Start 4/1/20 

at 22:00;  Stop 4/2/20 at 21:59;  Status DC


Dexmedetomidine HCl 400 mcg/ Sodium Chloride 100 ml @ 0 mls/hr CONT  PRN IV 

ANXIETY / AGITATION Last administered on 5/30/20at 12:57;  Start 4/2/20 at 

08:15;  Stop 5/30/20 at 18:31;  Status DC


Sodium Chloride 500 ml @  500 mls/hr 1X PRN  PRN IV ELEVATED BP, SEE COMMENTS;  

Start 4/2/20 at 08:15


Atropine Sulfate (ATROPINE 0.5mg SYRINGE) 0.5 mg PRN Q5MIN  PRN IV SEE COMMENTS;

 Start 4/2/20 at 08:15


Furosemide (Lasix) 20 mg 1X  ONCE IVP  Last administered on 4/2/20at 08:19;  

Start 4/2/20 at 08:15;  Stop 4/2/20 at 08:16;  Status DC


Lidocaine HCl (Buffered Lidocaine 1%) 3 ml STK-MED ONCE .ROUTE ;  Start 4/2/20 

at 08:39;  Stop 4/2/20 at 08:39;  Status DC


Lidocaine HCl (Buffered Lidocaine 1%) 6 ml 1X  ONCE INJ  Last administered on 

4/2/20at 09:05;  Start 4/2/20 at 09:00;  Stop 4/2/20 at 09:06;  Status DC


Sodium Chloride 90 meq/Potassium Chloride 15 meq/ Potassium Phosphate 18 mmol/ 

Magnesium Sulfate 8 meq/Calcium Gluconate 15 meq/ Multivitamins 10 ml/Chromium/ 

Copper/Manganese/ Seleni/Zn 0.5 ml/ Insulin Human Regular 20 unit/ Total 

Parenteral Nutrition/Amino Acids/Dextrose/ Fat Emulsion Intravenous 1,400 ml @  

58.333 mls/ hr TPN  CONT IV  Last administered on 4/2/20at 22:45;  Start 4/2/20 

at 22:00;  Stop 4/3/20 at 21:59;  Status DC


Sodium Chloride 1,000 ml @  1,000 mls/hr Q1H PRN IV hypotension;  Start 4/3/20 

at 07:30;  Stop 4/3/20 at 13:29;  Status DC


Albumin Human 200 ml @  200 mls/hr 1X PRN  PRN IV Hypotension Last administered 

on 4/3/20at 09:36;  Start 4/3/20 at 07:30;  Stop 4/3/20 at 13:29;  Status DC


Sodium Chloride (Normal Saline Flush) 10 ml 1X PRN  PRN IV AP catheter pack;  

Start 4/3/20 at 07:30;  Stop 4/3/20 at 21:29;  Status DC


Sodium Chloride (Normal Saline Flush) 10 ml 1X PRN  PRN IV  catheter pack;  

Start 4/3/20 at 07:30;  Stop 4/4/20 at 07:29;  Status DC


Sodium Chloride 1,000 ml @  400 mls/hr Q2H30M PRN IV PATENCY;  Start 4/3/20 at 

07:30;  Stop 4/3/20 at 19:29;  Status DC


Info (PHARMACY MONITORING -- do not chart) 1 each PRN DAILY  PRN MC SEE 

COMMENTS;  Start 4/3/20 at 07:30;  Stop 4/3/20 at 13:02;  Status DC


Info (PHARMACY MONITORING -- do not chart) 1 each PRN DAILY  PRN MC SEE 

COMMENTS;  Start 4/3/20 at 07:30;  Stop 4/5/20 at 12:45;  Status DC


Sodium Chloride 90 meq/Potassium Chloride 15 meq/ Potassium Phosphate 10 mmol/ 

Magnesium Sulfate 8 meq/Calcium Gluconate 15 meq/ Multivitamins 10 ml/Chromium/ 

Copper/Manganese/ Seleni/Zn 0.5 ml/ Insulin Human Regular 25 unit/ Total 

Parenteral Nutrition/Amino Acids/Dextrose/ Fat Emulsion Intravenous 1,400 ml @  

58.333 mls/ hr TPN  CONT IV  Last administered on 4/3/20at 22:19;  Start 4/3/20 

at 22:00;  Stop 4/4/20 at 21:59;  Status DC


Heparin Sodium (Porcine) (Heparin Sodium) 5,000 unit Q12HR SQ  Last administered

on 4/26/20at 08:59;  Start 4/3/20 at 21:00;  Stop 4/26/20 at 10:05;  Status DC


Ondansetron HCl (Zofran) 4 mg PRN Q6HRS  PRN IV NAUSEA/VOMITING;  Start 4/6/20 

at 07:00;  Stop 4/7/20 at 06:59;  Status DC


Fentanyl Citrate (Fentanyl 2ml Vial) 25 mcg PRN Q5MIN  PRN IV MILD PAIN 1-3;  

Start 4/6/20 at 07:00;  Stop 4/7/20 at 06:59;  Status DC


Fentanyl Citrate (Fentanyl 2ml Vial) 50 mcg PRN Q5MIN  PRN IV MODERATE TO SEVERE

PAIN;  Start 4/6/20 at 07:00;  Stop 4/7/20 at 06:59;  Status DC


Ringer's Solution 1,000 ml @  30 mls/hr Q24H IV ;  Start 4/6/20 at 07:00;  Stop 

4/6/20 at 18:59;  Status DC


Lidocaine HCl (Xylocaine-Mpf 1% 2ml Vial) 2 ml PRN 1X  PRN ID PRIOR TO IV START;

 Start 4/6/20 at 07:00;  Stop 4/7/20 at 06:59;  Status DC


Prochlorperazine Edisylate (Compazine) 5 mg PACU PRN  PRN IV NAUSEA, MRX1;  

Start 4/6/20 at 07:00;  Stop 4/7/20 at 06:59;  Status DC


Sodium Chloride 1,000 ml @  1,000 mls/hr Q1H PRN IV hypotension;  Start 4/4/20 

at 09:10;  Stop 4/4/20 at 15:09;  Status DC


Albumin Human 200 ml @  200 mls/hr 1X PRN  PRN IV Hypotension Last administered 

on 4/4/20at 10:10;  Start 4/4/20 at 09:15;  Stop 4/4/20 at 15:14;  Status DC


Sodium Chloride 1,000 ml @  400 mls/hr Q2H30M PRN IV PATENCY;  Start 4/4/20 at 

09:10;  Stop 4/4/20 at 21:09;  Status DC


Info (PHARMACY MONITORING -- do not chart) 1 each PRN DAILY  PRN MC SEE 

COMMENTS;  Start 4/4/20 at 09:15;  Stop 4/5/20 at 12:45;  Status DC


Info (PHARMACY MONITORING -- do not chart) 1 each PRN DAILY  PRN MC SEE 

COMMENTS;  Start 4/4/20 at 09:15;  Stop 4/5/20 at 12:45;  Status DC


Sodium Chloride 90 meq/Potassium Chloride 15 meq/ Potassium Phosphate 10 mmol/ 

Magnesium Sulfate 8 meq/Calcium Gluconate 15 meq/ Multivitamins 10 ml/Chromium/ 

Copper/Manganese/ Seleni/Zn 0.5 ml/ Insulin Human Regular 25 unit/ Total 

Parenteral Nutrition/Amino Acids/Dextrose/ Fat Emulsion Intravenous 1,400 ml @  

58.333 mls/ hr TPN  CONT IV  Last administered on 4/4/20at 22:10;  Start 4/4/20 

at 22:00;  Stop 4/5/20 at 21:59;  Status DC


Magnesium Sulfate 50 ml @ 25 mls/hr PRN DAILY  PRN IV for Mag < 1.7 on am labs 

Last administered on 6/18/20at 10:57;  Start 4/5/20 at 09:15


Sodium Chloride 90 meq/Potassium Chloride 15 meq/ Potassium Phosphate 10 mmol/ 

Magnesium Sulfate 8 meq/Calcium Gluconate 15 meq/ Multivitamins 10 ml/Chromium/ 

Copper/Manganese/ Seleni/Zn 0.5 ml/ Insulin Human Regular 25 unit/ Total 

Parenteral Nutrition/Amino Acids/Dextrose/ Fat Emulsion Intravenous 1,400 ml @  

58.333 mls/ hr TPN  CONT IV  Last administered on 4/5/20at 21:20;  Start 4/5/20 

at 22:00;  Stop 4/6/20 at 21:59;  Status DC


Sodium Chloride 1,000 ml @  1,000 mls/hr Q1H PRN IV hypotension;  Start 4/5/20 

at 12:23;  Stop 4/5/20 at 18:22;  Status DC


Albumin Human 200 ml @  200 mls/hr 1X  ONCE IV  Last administered on 4/5/20at 

13:34;  Start 4/5/20 at 12:30;  Stop 4/5/20 at 13:29;  Status DC


Diphenhydramine HCl (Benadryl) 25 mg 1X PRN  PRN IV ITCHING;  Start 4/5/20 at 

12:30;  Stop 4/6/20 at 12:29;  Status DC


Diphenhydramine HCl (Benadryl) 25 mg 1X PRN  PRN IV ITCHING;  Start 4/5/20 at 

12:30;  Stop 4/6/20 at 12:29;  Status DC


Info (PHARMACY MONITORING -- do not chart) 1 each PRN DAILY  PRN MC SEE 

COMMENTS;  Start 4/5/20 at 12:30;  Status Cancel


Bupivacaine HCl/ Epinephrine Bitart (Sensorcain-Epi 0.5%-1:[id number] Mpf) 30 ml 

STK-MED ONCE .ROUTE  Last administered on 4/6/20at 11:44;  Start 4/6/20 at 

11:00;  Stop 4/6/20 at 11:01;  Status DC


Cellulose (Surgicel Fibrillar 1x2) 1 each STK-MED ONCE .ROUTE ;  Start 4/6/20 at

11:00;  Stop 4/6/20 at 11:01;  Status DC


Sodium Chloride 90 meq/Potassium Chloride 15 meq/ Potassium Phosphate 10 mmol/ 

Magnesium Sulfate 12 meq/Calcium Gluconate 15 meq/ Multivitamins 10 ml/Chromium/

Copper/Manganese/ Seleni/Zn 0.5 ml/ Insulin Human Regular 25 unit/ Total 

Parenteral Nutrition/Amino Acids/Dextrose/ Fat Emulsion Intravenous 1,400 ml @  

58.333 mls/ hr TPN  CONT IV  Last administered on 4/6/20at 22:24;  Start 4/6/20 

at 22:00;  Stop 4/7/20 at 21:59;  Status DC


Propofol 20 ml @ As Directed STK-MED ONCE IV ;  Start 4/6/20 at 11:07;  Stop 

4/6/20 at 11:07;  Status DC


Cellulose (Surgicel Hemostat 4x8) 1 each STK-MED ONCE .ROUTE  Last administered 

on 4/6/20at 11:44;  Start 4/6/20 at 11:55;  Stop 4/6/20 at 11:56;  Status DC


Sevoflurane (Ultane) 60 ml STK-MED ONCE IH ;  Start 4/6/20 at 12:46;  Stop 

4/6/20 at 12:46;  Status DC


Sodium Chloride 1,000 ml @  1,000 mls/hr Q1H PRN IV hypotension;  Start 4/6/20 

at 13:51;  Stop 4/6/20 at 19:50;  Status DC


Albumin Human 200 ml @  200 mls/hr 1X PRN  PRN IV Hypotension Last administered 

on 4/6/20at 14:51;  Start 4/6/20 at 14:00;  Stop 4/6/20 at 19:59;  Status DC


Diphenhydramine HCl (Benadryl) 25 mg 1X PRN  PRN IV ITCHING;  Start 4/6/20 at 

14:00;  Stop 4/7/20 at 13:59;  Status DC


Diphenhydramine HCl (Benadryl) 25 mg 1X PRN  PRN IV ITCHING;  Start 4/6/20 at 

14:00;  Stop 4/7/20 at 13:59;  Status DC


Sodium Chloride 1,000 ml @  400 mls/hr Q2H30M PRN IV PATENCY;  Start 4/6/20 at 

13:51;  Stop 4/7/20 at 01:50;  Status DC


Info (PHARMACY MONITORING -- do not chart) 1 each PRN DAILY  PRN MC SEE 

COMMENTS;  Start 4/6/20 at 14:00;  Stop 4/9/20 at 08:16;  Status DC


Heparin Sodium (Porcine) (Hep Lock Adult) 500 unit STK-MED ONCE IVP ;  Start 

4/7/20 at 09:29;  Stop 4/7/20 at 09:30;  Status DC


Sodium Chloride 1,000 ml @  1,000 mls/hr Q1H PRN IV hypotension;  Start 4/7/20 

at 10:43;  Stop 4/7/20 at 16:42;  Status DC


Sodium Chloride 1,000 ml @  400 mls/hr Q2H30M PRN IV PATENCY;  Start 4/7/20 at 

10:43;  Stop 4/7/20 at 22:42;  Status DC


Info (PHARMACY MONITORING -- do not chart) 1 each PRN DAILY  PRN MC SEE 

COMMENTS;  Start 4/7/20 at 10:45;  Status UNV


Info (PHARMACY MONITORING -- do not chart) 1 each PRN DAILY  PRN MC SEE 

COMMENTS;  Start 4/7/20 at 10:45;  Status UNV


Sodium Chloride 90 meq/Potassium Chloride 15 meq/ Magnesium Sulfate 12 

meq/Calcium Gluconate 15 meq/ Multivitamins 10 ml/Chromium/ Copper/Manganese/ 

Seleni/Zn 0.5 ml/ Insulin Human Regular 25 unit/ Total Parenteral 

Nutrition/Amino Acids/Dextrose/ Fat Emulsion Intravenous 1,400 ml @  58.333 mls/

hr TPN  CONT IV  Last administered on 4/7/20at 22:13;  Start 4/7/20 at 22:00;  

Stop 4/8/20 at 21:59;  Status DC


Sodium Chloride 1,000 ml @  1,000 mls/hr Q1H PRN IV hypotension;  Start 4/8/20 

at 07:50;  Stop 4/8/20 at 13:49;  Status DC


Albumin Human 200 ml @  200 mls/hr 1X  ONCE IV ;  Start 4/8/20 at 08:00;  Stop 

4/8/20 at 08:53;  Status DC


Diphenhydramine HCl (Benadryl) 25 mg 1X PRN  PRN IV ITCHING;  Start 4/8/20 at 

08:00;  Stop 4/9/20 at 07:59;  Status DC


Diphenhydramine HCl (Benadryl) 25 mg 1X PRN  PRN IV ITCHING;  Start 4/8/20 at 

08:00;  Stop 4/9/20 at 07:59;  Status DC


Info (PHARMACY MONITORING -- do not chart) 1 each PRN DAILY  PRN MC SEE 

COMMENTS;  Start 4/8/20 at 08:00;  Stop 4/9/20 at 08:16;  Status DC


Albumin Human 50 ml @ 50 mls/hr 1X  ONCE IV ;  Start 4/8/20 at 08:53;  Stop 

4/8/20 at 08:56;  Status DC


Albumin Human 200 ml @  50 mls/hr PRN 1X  PRN IV HYPOTENSION Last administered 

on 4/14/20at 11:54;  Start 4/8/20 at 09:00;  Stop 5/21/20 at 11:14;  Status DC


Meropenem 500 mg/ Sodium Chloride 50 ml @  100 mls/hr Q12H IV  Last administered

on 4/28/20at 10:45;  Start 4/8/20 at 10:00;  Stop 4/28/20 at 12:37;  Status DC


Sodium Chloride 90 meq/Magnesium Sulfate 12 meq/ Calcium Gluconate 15 meq/ 

Multivitamins 10 ml/Chromium/ Copper/Manganese/ Seleni/Zn 0.5 ml/ Insulin Human 

Regular 25 unit/ Total Parenteral Nutrition/Amino Acids/Dextrose/ Fat Emulsion 

Intravenous 1,400 ml @  58.333 mls/ hr TPN  CONT IV  Last administered on 

4/8/20at 21:41;  Start 4/8/20 at 22:00;  Stop 4/9/20 at 21:59;  Status DC


Sodium Chloride 1,000 ml @  1,000 mls/hr Q1H PRN IV hypotension;  Start 4/9/20 

at 07:58;  Stop 4/9/20 at 13:57;  Status DC


Albumin Human 200 ml @  200 mls/hr 1X PRN  PRN IV Hypotension Last administered 

on 4/9/20at 09:30;  Start 4/9/20 at 08:00;  Stop 4/9/20 at 13:59;  Status DC


Sodium Chloride 1,000 ml @  400 mls/hr Q2H30M PRN IV PATENCY;  Start 4/9/20 at 

07:58;  Stop 4/9/20 at 19:57;  Status DC


Info (PHARMACY MONITORING -- do not chart) 1 each PRN DAILY  PRN MC SEE 

COMMENTS;  Start 4/9/20 at 08:00;  Status Cancel


Info (PHARMACY MONITORING -- do not chart) 1 each PRN DAILY  PRN MC SEE 

COMMENTS;  Start 4/9/20 at 08:15;  Status UNV


Sodium Chloride 90 meq/Potassium Phosphate 5 mmol/ Magnesium Sulfate 12 

meq/Calcium Gluconate 15 meq/ Multivitamins 10 ml/Chromium/ Copper/Manganese/ 

Seleni/Zn 0.5 ml/ Insulin Human Regular 30 unit/ Total Parenteral 

Nutrition/Amino Acids/Dextrose/ Fat Emulsion Intravenous 1,400 ml @  58.333 mls/

hr TPN  CONT IV  Last administered on 4/9/20at 22:08;  Start 4/9/20 at 22:00;  

Stop 4/10/20 at 21:59;  Status DC


Linezolid/Dextrose 300 ml @  300 mls/hr Q12HR IV  Last administered on 4/20/20at

20:40;  Start 4/10/20 at 11:00;  Stop 4/21/20 at 08:10;  Status DC


Sodium Chloride 90 meq/Potassium Phosphate 15 mmol/ Magnesium Sulfate 12 

meq/Calcium Gluconate 15 meq/ Multivitamins 10 ml/Chromium/ Copper/Manganese/ 

Seleni/Zn 0.5 ml/ Insulin Human Regular 30 unit/ Total Parenteral Nutr

ition/Amino Acids/Dextrose/ Fat Emulsion Intravenous 1,400 ml @  58.333 mls/ hr 

TPN  CONT IV  Last administered on 4/10/20at 21:49;  Start 4/10/20 at 22:00;  

Stop 4/11/20 at 21:59;  Status DC


Sodium Chloride 90 meq/Potassium Phosphate 15 mmol/ Magnesium Sulfate 12 

meq/Calcium Gluconate 15 meq/ Multivitamins 10 ml/Chromium/ Copper/Manganese/ 

Seleni/Zn 0.5 ml/ Insulin Human Regular 40 unit/ Total Parenteral 

Nutrition/Amino Acids/Dextrose/ Fat Emulsion Intravenous 1,400 ml @  58.333 mls/

hr TPN  CONT IV  Last administered on 4/11/20at 21:21;  Start 4/11/20 at 22:00; 

Stop 4/12/20 at 21:59;  Status DC


Sodium Chloride 1,000 ml @  1,000 mls/hr Q1H PRN IV hypotension;  Start 4/11/20 

at 13:26;  Stop 4/11/20 at 19:25;  Status DC


Albumin Human 200 ml @  200 mls/hr 1X PRN  PRN IV Hypotension Last administered 

on 4/11/20at 15:00;  Start 4/11/20 at 13:30;  Stop 4/11/20 at 19:29;  Status DC


Sodium Chloride (Normal Saline Flush) 10 ml 1X PRN  PRN IV AP catheter pack;  

Start 4/11/20 at 13:30;  Stop 4/12/20 at 13:29;  Status DC


Sodium Chloride (Normal Saline Flush) 10 ml 1X PRN  PRN IV  catheter pack;  

Start 4/11/20 at 13:30;  Stop 4/12/20 at 13:29;  Status DC


Sodium Chloride 1,000 ml @  400 mls/hr Q2H30M PRN IV PATENCY;  Start 4/11/20 at 

13:26;  Stop 4/12/20 at 01:25;  Status DC


Info (PHARMACY MONITORING -- do not chart) 1 each PRN DAILY  PRN MC SEE 

COMMENTS;  Start 4/11/20 at 13:30;  Stop 4/11/20 at 13:33;  Status DC


Info (PHARMACY MONITORING -- do not chart) 1 each PRN DAILY  PRN MC SEE 

COMMENTS;  Start 4/11/20 at 13:30;  Stop 4/11/20 at 13:34;  Status DC


Sodium Chloride 90 meq/Potassium Phosphate 19 mmol/ Magnesium Sulfate 12 

meq/Calcium Gluconate 15 meq/ Multivitamins 10 ml/Chromium/ Copper/Manganese/ 

Seleni/Zn 0.5 ml/ Insulin Human Regular 40 unit/ Total Parenteral Nutri

tion/Amino Acids/Dextrose/ Fat Emulsion Intravenous 1,400 ml @  58.333 mls/ hr 

TPN  CONT IV  Last administered on 4/12/20at 21:54;  Start 4/12/20 at 22:00;  

Stop 4/13/20 at 21:59;  Status DC


Sodium Chloride 1,000 ml @  1,000 mls/hr Q1H PRN IV hypotension;  Start 4/13/20 

at 09:35;  Stop 4/13/20 at 15:34;  Status DC


Albumin Human 200 ml @  200 mls/hr 1X PRN  PRN IV Hypotension;  Start 4/13/20 at

09:45;  Stop 4/13/20 at 15:44;  Status DC


Diphenhydramine HCl (Benadryl) 25 mg 1X PRN  PRN IV ITCHING;  Start 4/13/20 at 

09:45;  Stop 4/14/20 at 09:44;  Status DC


Diphenhydramine HCl (Benadryl) 25 mg 1X PRN  PRN IV ITCHING;  Start 4/13/20 at 0

9:45;  Stop 4/14/20 at 09:44;  Status DC


Sodium Chloride 1,000 ml @  400 mls/hr Q2H30M PRN IV PATENCY;  Start 4/13/20 at 

09:35;  Stop 4/13/20 at 21:34;  Status DC


Info (PHARMACY MONITORING -- do not chart) 1 each PRN DAILY  PRN MC SEE 

COMMENTS;  Start 4/13/20 at 09:45;  Status Cancel


Sodium Chloride 100 meq/Potassium Phosphate 19 mmol/ Magnesium Sulfate 12 

meq/Calcium Gluconate 15 meq/ Multivitamins 10 ml/Chromium/ Copper/Manganese/ 

Seleni/Zn 0.5 ml/ Insulin Human Regular 40 unit/ Potassium Chloride 20 meq/ 

Total Parenteral Nutrition/Amino Acids/Dextrose/ Fat Emulsion Intravenous 1,400 

ml @  58.333 mls/ hr TPN  CONT IV  Last administered on 4/13/20at 22:02;  Start 

4/13/20 at 22:00;  Stop 4/14/20 at 21:59;  Status DC


Furosemide (Lasix) 40 mg 1X  ONCE IVP  Last administered on 4/13/20at 14:39;  

Start 4/13/20 at 14:30;  Stop 4/13/20 at 14:31;  Status DC


Metronidazole 100 ml @  100 mls/hr Q8HRS IV  Last administered on 4/21/20at 

06:04;  Start 4/14/20 at 10:00;  Stop 4/21/20 at 08:10;  Status DC


Sodium Chloride 1,000 ml @  1,000 mls/hr Q1H PRN IV hypotension;  Start 4/14/20 

at 08:00;  Stop 4/14/20 at 13:59;  Status DC


Albumin Human 200 ml @  200 mls/hr 1X PRN  PRN IV Hypotension;  Start 4/14/20 at

08:00;  Stop 4/14/20 at 13:59;  Status DC


Sodium Chloride 1,000 ml @  400 mls/hr Q2H30M PRN IV PATENCY;  Start 4/14/20 at 

08:00;  Stop 4/14/20 at 19:59;  Status DC


Info (PHARMACY MONITORING -- do not chart) 1 each PRN DAILY  PRN MC SEE 

COMMENTS;  Start 4/14/20 at 11:30;  Status UNV


Info (PHARMACY MONITORING -- do not chart) 1 each PRN DAILY  PRN MC SEE 

COMMENTS;  Start 4/14/20 at 11:30;  Stop 4/16/20 at 12:13;  Status DC


Sodium Chloride 100 meq/Potassium Phosphate 19 mmol/ Magnesium Sulfate 12 

meq/Calcium Gluconate 15 meq/ Multivitamins 10 ml/Chromium/ Copper/Manganese/ 

Seleni/Zn 0.5 ml/ Insulin Human Regular 40 unit/ Potassium Chloride 20 meq/ 

Total Parenteral Nutrition/Amino Acids/Dextrose/ Fat Emulsion Intravenous 1,400 

ml @  58.333 mls/ hr TPN  CONT IV  Last administered on 4/14/20at 21:52;  Start 

4/14/20 at 22:00;  Stop 4/15/20 at 21:59;  Status DC


Sodium Chloride (Normal Saline Flush) 10 ml QSHIFT  PRN IV AFTER MEDS AND BLOOD 

DRAWS;  Start 4/14/20 at 15:00;  Stop 5/12/20 at 11:27;  Status DC


Sodium Chloride (Normal Saline Flush) 10 ml PRN Q5MIN  PRN IV AFTER MEDS AND BL

OOD DRAWS;  Start 4/14/20 at 15:00


Sodium Chloride (Normal Saline Flush) 20 ml PRN Q5MIN  PRN IV AFTER MEDS AND 

BLOOD DRAWS;  Start 4/14/20 at 15:00


Sodium Chloride 100 meq/Potassium Phosphate 19 mmol/ Magnesium Sulfate 12 

meq/Calcium Gluconate 15 meq/ Multivitamins 10 ml/Chromium/ Copper/Manganese/ 

Seleni/Zn 0.5 ml/ Insulin Human Regular 40 unit/ Potassium Chloride 20 meq/ 

Total Parenteral Nutrition/Amino Acids/Dextrose/ Fat Emulsion Intravenous 1,400 

ml @  58.333 mls/ hr TPN  CONT IV  Last administered on 4/15/20at 21:20;  Start 

4/15/20 at 22:00;  Stop 4/16/20 at 21:59;  Status DC


Lidocaine HCl (Buffered Lidocaine 1%) 3 ml STK-MED ONCE .ROUTE ;  Start 4/15/20 

at 13:16;  Stop 4/15/20 at 13:16;  Status DC


Lidocaine HCl (Buffered Lidocaine 1%) 6 ml 1X  ONCE INJ  Last administered on 

4/15/20at 13:45;  Start 4/15/20 at 13:30;  Stop 4/15/20 at 13:31;  Status DC


Albumin Human 100 ml @  100 mls/hr 1X  ONCE IV  Last administered on 4/15/20at 

15:41;  Start 4/15/20 at 15:00;  Stop 4/15/20 at 15:59;  Status DC


Albumin Human 50 ml @ 50 mls/hr 1X  ONCE IV  Last administered on 4/15/20at 

15:00;  Start 4/15/20 at 15:00;  Stop 4/15/20 at 15:59;  Status DC


Info (PHARMACY MONITORING -- do not chart) 1 each PRN DAILY  PRN MC SEE 

COMMENTS;  Start 4/16/20 at 11:30;  Status Cancel


Info (PHARMACY MONITORING -- do not chart) 1 each PRN DAILY  PRN MC SEE 

COMMENTS;  Start 4/16/20 at 11:30;  Status UNV


Sodium Chloride 100 meq/Potassium Phosphate 10 mmol/ Magnesium Sulfate 12 

meq/Calcium Gluconate 15 meq/ Multivitamins 10 ml/Chromium/ Copper/Manganese/ 

Seleni/Zn 0.5 ml/ Insulin Human Regular 35 unit/ Potassium Chloride 20 meq/ 

Total Parenteral Nutrition/Amino Acids/Dextrose/ Fat Emulsion Intravenous 1,400 

ml @  58.333 mls/ hr TPN  CONT IV  Last administered on 4/16/20at 22:10;  Start 

4/16/20 at 22:00;  Stop 4/17/20 at 21:59;  Status DC


Sodium Chloride 100 meq/Potassium Phosphate 5 mmol/ Magnesium Sulfate 12 meq/Ca

lcium Gluconate 15 meq/ Multivitamins 10 ml/Chromium/ Copper/Manganese/ 

Seleni/Zn 0.5 ml/ Insulin Human Regular 35 unit/ Potassium Chloride 20 meq/ 

Total Parenteral Nutrition/Amino Acids/Dextrose/ Fat Emulsion Intravenous 1,400 

ml @  58.333 mls/ hr TPN  CONT IV  Last administered on 4/17/20at 22:59;  Start 

4/17/20 at 22:00;  Stop 4/18/20 at 21:59;  Status DC


Sodium Chloride 1,000 ml @  1,000 mls/hr Q1H PRN IV hypotension;  Start 4/18/20 

at 08:27;  Stop 4/18/20 at 14:26;  Status DC


Albumin Human 200 ml @  200 mls/hr 1X PRN  PRN IV Hypotension Last administered 

on 4/18/20at 09:18;  Start 4/18/20 at 08:30;  Stop 4/18/20 at 14:29;  Status DC


Sodium Chloride 1,000 ml @  400 mls/hr Q2H30M PRN IV PATENCY;  Start 4/18/20 at 

08:27;  Stop 4/18/20 at 20:26;  Status DC


Info (PHARMACY MONITORING -- do not chart) 1 each PRN DAILY  PRN MC SEE 

COMMENTS;  Start 4/18/20 at 08:30;  Status Cancel


Info (PHARMACY MONITORING -- do not chart) 1 each PRN DAILY  PRN MC SEE 

COMMENTS;  Start 4/18/20 at 08:30;  Stop 4/26/20 at 13:10;  Status DC


Sodium Chloride 100 meq/Potassium Chloride 40 meq/ Magnesium Sulfate 15 

meq/Calcium Gluconate 15 meq/ Multivitamins 10 ml/Chromium/ Copper/Manganese/ 

Seleni/Zn 0.5 ml/ Insulin Human Regular 35 unit/ Total Parenteral 

Nutrition/Amino Acids/Dextrose/ Fat Emulsion Intravenous 1,400 ml @  58.333 mls/

hr TPN  CONT IV  Last administered on 4/18/20at 22:00;  Start 4/18/20 at 22:00; 

Stop 4/19/20 at 21:59;  Status DC


Potassium Chloride/Water 100 ml @  100 mls/hr 1X  ONCE IV  Last administered on 

4/18/20at 17:28;  Start 4/18/20 at 14:45;  Stop 4/18/20 at 15:44;  Status DC


Sodium Chloride 100 meq/Potassium Chloride 40 meq/ Magnesium Sulfate 15 

meq/Calcium Gluconate 15 meq/ Multivitamins 10 ml/Chromium/ Copper/Manganese/ 

Seleni/Zn 0.5 ml/ Insulin Human Regular 35 unit/ Total Parenteral 

Nutrition/Amino Acids/Dextrose/ Fat Emulsion Intravenous 1,400 ml @  58.333 mls/

hr TPN  CONT IV  Last administered on 4/19/20at 22:46;  Start 4/19/20 at 22:00; 

Stop 4/20/20 at 21:59;  Status DC


Sodium Chloride 100 meq/Potassium Chloride 40 meq/ Magnesium Sulfate 20 

meq/Calcium Gluconate 15 meq/ Multivitamins 10 ml/Chromium/ Copper/Manganese/ 

Seleni/Zn 0.5 ml/ Insulin Human Regular 35 unit/ Total Parenteral 

Nutrition/Amino Acids/Dextrose/ Fat Emulsion Intravenous 1,400 ml @  58.333 mls/

hr TPN  CONT IV  Last administered on 4/20/20at 22:31;  Start 4/20/20 at 22:00; 

Stop 4/21/20 at 21:59;  Status DC


Fentanyl Citrate (Fentanyl 2ml Vial) 50 mcg PRN Q2HR  PRN IVP PAIN Last 

administered on 4/27/20at 13:32;  Start 4/20/20 at 21:00;  Stop 4/28/20 at 

12:53;  Status DC


Fentanyl Citrate (Fentanyl 2ml Vial) 25 mcg PRN Q2HR  PRN IVP PAIN;  Start 

4/20/20 at 21:00;  Stop 4/28/20 at 12:54;  Status DC


Enoxaparin Sodium (Lovenox 100mg Syringe) 100 mg Q12HR SQ ;  Start 4/21/20 at 

21:00;  Status UNV


Amino Acids/ Glycerin/ Electrolytes 1,000 ml @  75 mls/hr [id number] IV ;  Start 

4/20/20 at 21:15;  Status UNV


Sodium Chloride 1,000 ml @  1,000 mls/hr Q1H PRN IV hypotension;  Start 4/21/20 

at 07:56;  Stop 4/21/20 at 13:55;  Status DC


Albumin Human 200 ml @  200 mls/hr 1X PRN  PRN IV Hypotension Last administered 

on 4/21/20at 08:40;  Start 4/21/20 at 08:00;  Stop 4/21/20 at 13:59;  Status DC


Sodium Chloride 1,000 ml @  400 mls/hr Q2H30M PRN IV PATENCY;  Start 4/21/20 at 

07:56;  Stop 4/21/20 at 19:55;  Status DC


Info (PHARMACY MONITORING -- do not chart) 1 each PRN DAILY  PRN MC SEE 

COMMENTS;  Start 4/21/20 at 08:00;  Status UNV


Info (PHARMACY MONITORING -- do not chart) 1 each PRN DAILY  PRN MC SEE 

COMMENTS;  Start 4/21/20 at 08:00;  Status UNV


Daptomycin 430 mg/ Sodium Chloride 50 ml @  100 mls/hr Q24H IV  Last 

administered on 4/21/20at 12:35;  Start 4/21/20 at 09:00;  Stop 4/21/20 at 

12:49;  Status DC


Sodium Chloride 100 meq/Potassium Chloride 40 meq/ Magnesium Sulfate 20 

meq/Calcium Gluconate 15 meq/ Multivitamins 10 ml/Chromium/ Copper/Manganese/ 

Seleni/Zn 0.5 ml/ Insulin Human Regular 35 unit/ Total Parenteral 

Nutrition/Amino Acids/Dextrose/ Fat Emulsion Intravenous 1,400 ml @  58.333 mls/

hr TPN  CONT IV  Last administered on 4/21/20at 21:26;  Start 4/21/20 at 22:00; 

Stop 4/22/20 at 21:59;  Status DC


Daptomycin 430 mg/ Sodium Chloride 50 ml @  100 mls/hr Q48H IV ;  Start 4/23/20 

at 09:00;  Stop 4/22/20 at 11:55;  Status DC


Sodium Chloride 100 meq/Potassium Chloride 40 meq/ Magnesium Sulfate 20 

meq/Calcium Gluconate 15 meq/ Multivitamins 10 ml/Chromium/ Copper/Manganese/ 

Seleni/Zn 0.5 ml/ Insulin Human Regular 35 unit/ Total Parenteral 

Nutrition/Amino Acids/Dextrose/ Fat Emulsion Intravenous 1,400 ml @  58.333 mls/

hr TPN  CONT IV  Last administered on 4/22/20at 22:27;  Start 4/22/20 at 22:00; 

Stop 4/23/20 at 21:59;  Status DC


Daptomycin 430 mg/ Sodium Chloride 50 ml @  100 mls/hr Q24H IV  Last 

administered on 4/24/20at 15:07;  Start 4/22/20 at 13:00;  Stop 4/25/20 at 

13:15;  Status DC


Sodium Chloride 100 meq/Potassium Chloride 40 meq/ Magnesium Sulfate 20 

meq/Calcium Gluconate 10 meq/ Multivitamins 10 ml/Chromium/ Copper/Manganese/ S

haley/Zn 0.5 ml/ Insulin Human Regular 35 unit/ Total Parenteral Nutrition/Amino

Acids/Dextrose/ Fat Emulsion Intravenous 1,400 ml @  58.333 mls/ hr TPN  CONT IV

 Last administered on 4/24/20at 00:06;  Start 4/23/20 at 22:00;  Stop 4/24/20 at

21:59;  Status DC


Alteplase, Recombinant (Cathflo For Central Catheter Clearance) 1 mg 1X  ONCE 

INT CAT  Last administered on 4/24/20at 11:44;  Start 4/24/20 at 10:45;  Stop 

4/24/20 at 10:46;  Status DC


Ondansetron HCl (Zofran) 4 mg PRN Q6HRS  PRN IV NAUSEA/VOMITING;  Start 4/27/20 

at 07:00;  Stop 4/28/20 at 06:59;  Status DC


Fentanyl Citrate (Fentanyl 2ml Vial) 25 mcg PRN Q5MIN  PRN IV MILD PAIN 1-3;  

Start 4/27/20 at 07:00;  Stop 4/28/20 at 06:59;  Status DC


Fentanyl Citrate (Fentanyl 2ml Vial) 50 mcg PRN Q5MIN  PRN IV MODERATE TO SEVERE

PAIN Last administered on 4/27/20at 10:17;  Start 4/27/20 at 07:00;  Stop 

4/28/20 at 06:59;  Status DC


Ringer's Solution 1,000 ml @  30 mls/hr Q24H IV ;  Start 4/27/20 at 07:00;  Stop

4/27/20 at 18:59;  Status DC


Lidocaine HCl (Xylocaine-Mpf 1% 2ml Vial) 2 ml PRN 1X  PRN ID PRIOR TO IV START;

 Start 4/27/20 at 07:00;  Stop 4/28/20 at 06:59;  Status DC


Prochlorperazine Edisylate (Compazine) 5 mg PACU PRN  PRN IV NAUSEA, MRX1;  

Start 4/27/20 at 07:00;  Stop 4/28/20 at 06:59;  Status DC


Sodium Acetate 50 meq/Potassium Acetate 55 meq/ Magnesium Sulfate 20 meq/Calcium

Gluconate 10 meq/ Multivitamins 10 ml/Chromium/ Copper/Manganese/ Seleni/Zn 0.5 

ml/ Insulin Human Regular 35 unit/ Total Parenteral Nutrition/Amino 

Acids/Dextrose/ Fat Emulsion Intravenous 1,400 ml @  58.333 mls/ hr TPN  CONT IV

;  Start 4/24/20 at 22:00;  Stop 4/24/20 at 14:15;  Status DC


Sodium Acetate 50 meq/Potassium Acetate 55 meq/ Magnesium Sulfate 20 meq/Calcium

Gluconate 10 meq/ Multivitamins 10 ml/Chromium/ Copper/Manganese/ Seleni/Zn 0.5 

ml/ Insulin Human Regular 35 unit/ Total Parenteral Nutrition/Amino 

Acids/Dextrose/ Fat Emulsion Intravenous 1,800 ml @  75 mls/hr TPN  CONT IV  

Last administered on 4/24/20at 22:38;  Start 4/24/20 at 22:00;  Stop 4/25/20 at 

21:59;  Status DC


Sodium Chloride 1,000 ml @  1,000 mls/hr Q1H PRN IV hypotension;  Start 4/24/20 

at 15:31;  Stop 4/24/20 at 21:30;  Status DC


Diphenhydramine HCl (Benadryl) 25 mg 1X PRN  PRN IV ITCHING;  Start 4/24/20 at 

15:45;  Stop 4/25/20 at 15:44;  Status DC


Diphenhydramine HCl (Benadryl) 25 mg 1X PRN  PRN IV ITCHING;  Start 4/24/20 at 

15:45;  Stop 4/25/20 at 15:44;  Status DC


Sodium Chloride 1,000 ml @  400 mls/hr Q2H30M PRN IV PATENCY;  Start 4/24/20 at 

15:31;  Stop 4/25/20 at 03:30;  Status DC


Info (PHARMACY MONITORING -- do not chart) 1 each PRN DAILY  PRN MC SEE 

COMMENTS;  Start 4/24/20 at 15:45;  Stop 5/26/20 at 14:14;  Status DC


Sodium Acetate 50 meq/Potassium Acetate 55 meq/ Magnesium Sulfate 20 meq/Calcium

Gluconate 10 meq/ Multivitamins 10 ml/Chromium/ Copper/Manganese/ Seleni/Zn 0.5 

ml/ Insulin Human Regular 35 unit/ Total Parenteral Nutrition/Amino 

Acids/Dextrose/ Fat Emulsion Intravenous 1,800 ml @  75 mls/hr TPN  CONT IV  

Last administered on 4/25/20at 22:03;  Start 4/25/20 at 22:00;  Stop 4/26/20 at 

21:59;  Status DC


Daptomycin 430 mg/ Sodium Chloride 50 ml @  100 mls/hr Q24H IV  Last 

administered on 4/30/20at 13:00;  Start 4/25/20 at 13:00;  Stop 4/30/20 at 

20:58;  Status DC


Heparin Sodium (Porcine) 1000 unit/Sodium Chloride 1,001 ml @  1,001 mls/hr 1X  

ONCE IRR ;  Start 4/27/20 at 06:00;  Stop 4/27/20 at 06:59;  Status DC


Potassium Acetate 55 meq/Magnesium Sulfate 20 meq/ Calcium Gluconate 10 meq/ 

Multivitamins 10 ml/Chromium/ Copper/Manganese/ Seleni/Zn 0.5 ml/ Insulin Human 

Regular 35 unit/ Total Parenteral Nutrition/Amino Acids/Dextrose/ Fat Emulsion 

Intravenous 1,920 ml @  80 mls/hr TPN  CONT IV  Last administered on 4/26/20at 

22:10;  Start 4/26/20 at 22:00;  Stop 4/27/20 at 21:59;  Status DC


Dexamethasone Sodium Phosphate (Decadron) 4 mg STK-MED ONCE .ROUTE ;  Start 

4/27/20 at 10:56;  Stop 4/27/20 at 10:57;  Status DC


Ondansetron HCl (Zofran) 4 mg STK-MED ONCE .ROUTE ;  Start 4/27/20 at 10:56;  

Stop 4/27/20 at 10:57;  Status DC


Rocuronium Bromide (Zemuron) 50 mg STK-MED ONCE .ROUTE ;  Start 4/27/20 at 

10:56;  Stop 4/27/20 at 10:57;  Status DC


Fentanyl Citrate (Fentanyl 2ml Vial) 100 mcg STK-MED ONCE .ROUTE ;  Start 

4/27/20 at 10:56;  Stop 4/27/20 at 10:57;  Status DC


Bupivacaine HCl/ Epinephrine Bitart (Sensorcain-Epi 0.5%-1:167104 Mpf) 30 ml 

STK-MED ONCE .ROUTE  Last administered on 4/27/20at 12:01;  Start 4/27/20 at 

10:58;  Stop 4/27/20 at 10:58;  Status DC


Cellulose (Surgicel Hemostat 2x14) 1 each STK-MED ONCE .ROUTE ;  Start 4/27/20 

at 10:58;  Stop 4/27/20 at 10:59;  Status DC


Iohexol (Omnipaque 300 Mg/ml) 50 ml STK-MED ONCE .ROUTE ;  Start 4/27/20 at 

10:58;  Stop 4/27/20 at 10:59;  Status DC


Cellulose (Surgicel Hemostat 4x8) 1 each STK-MED ONCE .ROUTE ;  Start 4/27/20 at

10:58;  Stop 4/27/20 at 10:59;  Status DC


Bisacodyl (Dulcolax Supp) 10 mg STK-MED ONCE .ROUTE ;  Start 4/27/20 at 10:59;  

Stop 4/27/20 at 10:59;  Status DC


Heparin Sodium (Porcine) 1000 unit/Sodium Chloride 1,001 ml @  1,001 mls/hr 1X  

ONCE IRR ;  Start 4/27/20 at 12:00;  Stop 4/27/20 at 12:59;  Status DC


Propofol 20 ml @ As Directed STK-MED ONCE IV ;  Start 4/27/20 at 11:05;  Stop 

4/27/20 at 11:05;  Status DC


Sevoflurane (Ultane) 90 ml STK-MED ONCE IH ;  Start 4/27/20 at 11:05;  Stop 

4/27/20 at 11:05;  Status DC


Sevoflurane (Ultane) 60 ml STK-MED ONCE IH ;  Start 4/27/20 at 12:26;  Stop 

4/27/20 at 12:27;  Status DC


Propofol 20 ml @ As Directed STK-MED ONCE IV ;  Start 4/27/20 at 12:26;  Stop 

4/27/20 at 12:27;  Status DC


Phenylephrine HCl (PHENYLEPHRINE in 0.9% NACL PF) 1 mg STK-MED ONCE IV ;  Start 

4/27/20 at 12:34;  Stop 4/27/20 at 12:34;  Status DC


Heparin Sodium (Porcine) (Heparin Sodium) 5,000 unit Q12HR SQ  Last administered

on 5/6/20at 20:57;  Start 4/27/20 at 21:00;  Stop 5/7/20 at 09:59;  Status DC


Sodium Chloride (Normal Saline Flush) 3 ml QSHIFT  PRN IV AFTER MEDS AND BLOOD 

DRAWS;  Start 4/27/20 at 13:45;  Status Cancel


Naloxone HCl (Narcan) 0.4 mg PRN Q2MIN  PRN IV SEE INSTRUCTIONS Last administer

ed on 6/6/20at 15:15;  Start 4/27/20 at 13:45;  Stop 7/1/20 at 16:00;  Status DC


Sodium Chloride 1,000 ml @  25 mls/hr Q24H IV  Last administered on 5/26/20at 

13:37;  Start 4/27/20 at 13:37;  Stop 5/29/20 at 13:09;  Status DC


Naloxone HCl (Narcan) 0.4 mg PRN Q2MIN  PRN IV SEE INSTRUCTIONS;  Start 4/27/20 

at 14:30;  Status UNV


Sodium Chloride 1,000 ml @  25 mls/hr Q24H IV ;  Start 4/27/20 at 14:30;  Status

UNV


Hydromorphone HCl 30 ml @ 0 mls/hr CONT PRN  PRN IV PER PROTOCOL Last 

administered on 5/2/20at 16:08;  Start 4/27/20 at 14:30;  Stop 5/4/20 at 08:55; 

Status DC


Potassium Acetate 55 meq/Magnesium Sulfate 20 meq/ Calcium Gluconate 10 meq/ 

Multivitamins 10 ml/Chromium/ Copper/Manganese/ Seleni/Zn 0.5 ml/ Insulin Human 

Regular 35 unit/ Total Parenteral Nutrition/Amino Acids/Dextrose/ Fat Emulsion 

Intravenous 1,920 ml @  80 mls/hr TPN  CONT IV  Last administered on 4/27/20at 

22:01;  Start 4/27/20 at 22:00;  Stop 4/28/20 at 21:59;  Status DC


Bumetanide (Bumex) 2 mg [id number] IV  Last administered on 5/1/20at 13:50;  Start 

4/28/20 at 14:00;  Stop 5/2/20 at 14:10;  Status DC


Meropenem 1 gm/ Sodium Chloride 100 ml @  200 mls/hr Q8HRS IV  Last administered

on 5/22/20at 05:53;  Start 4/28/20 at 14:00;  Stop 5/22/20 at 09:31;  Status DC


Potassium Acetate 55 meq/Magnesium Sulfate 20 meq/ Calcium Gluconate 10 meq/ 

Multivitamins 10 ml/Chromium/ Copper/Manganese/ Seleni/Zn 0.5 ml/ Insulin Human 

Regular 35 unit/ Total Parenteral Nutrition/Amino Acids/Dextrose/ Fat Emulsion 

Intravenous 1,920 ml @  80 mls/hr TPN  CONT IV  Last administered on 4/28/20at 

22:02;  Start 4/28/20 at 22:00;  Stop 4/29/20 at 21:59;  Status DC


Hydromorphone HCl (Dilaudid Standard PCA) 12 mg STK-MED ONCE IV ;  Start 4/27/20

at 14:35;  Stop 4/28/20 at 13:53;  Status DC


Artificial Tears (Artificial Tears) 1 drop PRN Q15MIN  PRN OU DRY EYE Last 

administered on 6/23/20at 21:17;  Start 4/29/20 at 05:30


Hydromorphone HCl (Dilaudid Standard PCA) 12 mg STK-MED ONCE IV ;  Start 4/28/20

at 12:05;  Stop 4/29/20 at 09:15;  Status DC


Potassium Acetate 65 meq/Magnesium Sulfate 20 meq/ Calcium Gluconate 10 meq/ 

Multivitamins 10 ml/Chromium/ Copper/Manganese/ Seleni/Zn 0.5 ml/ Insulin Human 

Regular 30 unit/ Total Parenteral Nutrition/Amino Acids/Dextrose/ Fat Emulsion 

Intravenous 1,920 ml @  80 mls/hr TPN  CONT IV  Last administered on 4/29/20at 

22:22;  Start 4/29/20 at 22:00;  Stop 4/30/20 at 21:59;  Status DC


Cyclobenzaprine HCl (Flexeril) 10 mg PRN Q6HRS  PRN PO MUSCLE SPASMS Last 

administered on 7/10/20at 19:12;  Start 4/30/20 at 10:45


Potassium Acetate 55 meq/Magnesium Sulfate 20 meq/ Calcium Gluconate 10 meq/ 

Multivitamins 10 ml/Chromium/ Copper/Manganese/ Seleni/Zn 0.5 ml/ Insulin Human 

Regular 30 unit/ Total Parenteral Nutrition/Amino Acids/Dextrose/ Fat Emulsion 

Intravenous 1,920 ml @  80 mls/hr TPN  CONT IV  Last administered on 5/1/20at 

01:00;  Start 4/30/20 at 22:00;  Stop 5/1/20 at 21:59;  Status DC


Magnesium Sulfate 50 ml @ 25 mls/hr 1X  ONCE IV  Last administered on 4/30/20at 

17:18;  Start 4/30/20 at 12:45;  Stop 4/30/20 at 14:44;  Status DC


Potassium Chloride/Water 100 ml @  100 mls/hr 1X  ONCE IV  Last administered on 

5/1/20at 11:27;  Start 5/1/20 at 12:00;  Stop 5/1/20 at 12:59;  Status DC


Hydromorphone HCl (Dilaudid Standard PCA) 12 mg STK-MED ONCE IV ;  Start 4/29/20

at 10:50;  Stop 5/1/20 at 11:02;  Status DC


Hydromorphone HCl (Dilaudid Standard PCA) 12 mg STK-MED ONCE IV ;  Start 4/30/20

at 13:47;  Stop 5/1/20 at 11:03;  Status DC


Potassium Acetate 30 meq/Magnesium Sulfate 20 meq/ Calcium Gluconate 10 meq/ 

Multivitamins 10 ml/Chromium/ Copper/Manganese/ Seleni/Zn 0.5 ml/ Insulin Human 

Regular 30 unit/ Potassium Chloride 30 meq/ Total Parenteral Nutrition/Amino 

Acids/Dextrose/ Fat Emulsion Intravenous 1,920 ml @  80 mls/hr TPN  CONT IV  

Last administered on 5/1/20at 22:34;  Start 5/1/20 at 22:00;  Stop 5/2/20 at 

21:59;  Status DC


Potassium Chloride/Water 100 ml @  100 mls/hr Q1H IV  Last administered on 

5/2/20at 13:05;  Start 5/2/20 at 07:00;  Stop 5/2/20 at 10:59;  Status DC


Magnesium Sulfate 50 ml @ 25 mls/hr 1X  ONCE IV  Last administered on 5/2/20at 

10:34;  Start 5/2/20 at 10:30;  Stop 5/2/20 at 12:29;  Status DC


Potassium Chloride 75 meq/ Magnesium Sulfate 20 meq/Calcium Gluconate 10 meq/ 

Multivitamins 10 ml/Chromium/ Copper/Manganese/ Seleni/Zn 0.5 ml/ Insulin Human 

Regular 30 unit/ Total Parenteral Nutrition/Amino Acids/Dextrose/ Fat Emulsion 

Intravenous 1,920 ml @  80 mls/hr TPN  CONT IV  Last administered on 5/2/20at 

21:51;  Start 5/2/20 at 22:00;  Stop 5/3/20 at 22:00;  Status DC


Potassium Chloride 75 meq/ Magnesium Sulfate 20 meq/Calcium Gluconate 10 meq/ 

Multivitamins 10 ml/Chromium/ Copper/Manganese/ Seleni/Zn 0.5 ml/ Insulin Human 

Regular 25 unit/ Total Parenteral Nutrition/Amino Acids/Dextrose/ Fat Emulsion 

Intravenous 1,920 ml @  80 mls/hr TPN  CONT IV  Last administered on 5/3/20at 

22:04;  Start 5/3/20 at 22:00;  Stop 5/4/20 at 21:59;  Status DC


Hydromorphone HCl (Dilaudid) 0.4 mg PRN Q4HRS  PRN IVP PAIN Last administered on

5/4/20at 10:57;  Start 5/4/20 at 09:00;  Stop 5/4/20 at 18:59;  Status DC


Micafungin Sodium 100 mg/Dextrose 100 ml @  100 mls/hr Q24H IV  Last 

administered on 5/26/20at 12:17;  Start 5/4/20 at 11:00;  Stop 5/27/20 at 09:59;

 Status DC


Daptomycin 485 mg/ Sodium Chloride 50 ml @  100 mls/hr Q24H IV  Last 

administered on 5/11/20at 13:10;  Start 5/4/20 at 11:00;  Stop 5/12/20 at 07:44;

 Status DC


Potassium Chloride 75 meq/ Magnesium Sulfate 15 meq/Calcium Gluconate 8 meq/ 

Multivitamins 10 ml/Chromium/ Copper/Manganese/ Seleni/Zn 0.5 ml/ Insulin Human 

Regular 25 unit/ Total Parenteral Nutrition/Amino Acids/Dextrose/ Fat Emulsion 

Intravenous 1,920 ml @  80 mls/hr TPN  CONT IV  Last administered on 5/4/20at 

23:08;  Start 5/4/20 at 22:00;  Stop 5/5/20 at 21:59;  Status DC


Haloperidol Lactate (Haldol Inj) 3 mg 1X  ONCE IVP  Last administered on 

5/4/20at 14:37;  Start 5/4/20 at 14:30;  Stop 5/4/20 at 14:31;  Status DC


Hydromorphone HCl (Dilaudid) 1 mg PRN Q4HRS  PRN IVP PAIN Last administered on 

5/18/20at 06:25;  Start 5/4/20 at 19:00;  Stop 5/18/20 at 17:10;  Status DC


Potassium Chloride 75 meq/ Magnesium Sulfate 15 meq/Calcium Gluconate 8 meq/ 

Multivitamins 10 ml/Chromium/ Copper/Manganese/ Seleni/Zn 0.5 ml/ Insulin Human 

Regular 20 unit/ Total Parenteral Nutrition/Amino Acids/Dextrose/ Fat Emulsion 

Intravenous 1,920 ml @  80 mls/hr TPN  CONT IV  Last administered on 5/5/20at 

22:10;  Start 5/5/20 at 22:00;  Stop 5/6/20 at 21:59;  Status DC


Lidocaine HCl (Buffered Lidocaine 1%) 3 ml STK-MED ONCE .ROUTE ;  Start 5/6/20 

at 11:31;  Stop 5/6/20 at 11:31;  Status DC


Lidocaine HCl (Buffered Lidocaine 1%) 3 ml STK-MED ONCE .ROUTE ;  Start 5/6/20 a

t 12:28;  Stop 5/6/20 at 12:29;  Status DC


Lidocaine HCl (Buffered Lidocaine 1%) 6 ml 1X  ONCE INJ  Last administered on 

5/6/20at 12:53;  Start 5/6/20 at 12:45;  Stop 5/6/20 at 12:46;  Status DC


Potassium Chloride 75 meq/ Magnesium Sulfate 15 meq/Calcium Gluconate 8 meq/ 

Multivitamins 10 ml/Chromium/ Copper/Manganese/ Seleni/Zn 0.5 ml/ Insulin Human 

Regular 20 unit/ Total Parenteral Nutrition/Amino Acids/Dextrose/ Fat Emulsion 

Intravenous 1,920 ml @  80 mls/hr TPN  CONT IV  Last administered on 5/6/20at 

22:00;  Start 5/6/20 at 22:00;  Stop 5/7/20 at 21:59;  Status DC


Potassium Chloride 75 meq/ Magnesium Sulfate 15 meq/Calcium Gluconate 8 meq/ 

Multivitamins 10 ml/Chromium/ Copper/Manganese/ Seleni/Zn 0.5 ml/ Insulin Human 

Regular 15 unit/ Total Parenteral Nutrition/Amino Acids/Dextrose/ Fat Emulsion 

Intravenous 1,920 ml @  80 mls/hr TPN  CONT IV  Last administered on 5/7/20at 

22:28;  Start 5/7/20 at 22:00;  Stop 5/8/20 at 21:59;  Status DC


Vecuronium Bromide (Norcuron Bolus) 6 mg PRN Q6HRS  PRN IV VENT ASYNCHRONY;  

Start 5/7/20 at 19:15;  Stop 5/7/20 at 19:35;  Status DC


Bumetanide (Bumex) 2 mg 1X  ONCE IV  Last administered on 5/7/20at 22:09;  Start

5/7/20 at 19:45;  Stop 5/7/20 at 19:46;  Status DC


Lidocaine HCl (Buffered Lidocaine 1%) 3 ml STK-MED ONCE .ROUTE ;  Start 5/8/20 

at 07:59;  Stop 5/8/20 at 07:59;  Status DC


Midazolam HCl (Versed) 5 mg STK-MED ONCE .ROUTE ;  Start 5/8/20 at 08:36;  Stop 

5/8/20 at 08:36;  Status DC


Fentanyl Citrate (Fentanyl 5ml Vial) 250 mcg STK-MED ONCE .ROUTE ;  Start 5/8/20

at 08:36;  Stop 5/8/20 at 08:37;  Status DC


Lidocaine HCl (Buffered Lidocaine 1%) 3 ml 1X  ONCE IJ  Last administered on 

5/8/20at 09:30;  Start 5/8/20 at 09:15;  Stop 5/8/20 at 09:16;  Status DC


Midazolam HCl (Versed) 5 mg 1X  ONCE IV  Last administered on 5/8/20at 09:30;  

Start 5/8/20 at 09:15;  Stop 5/8/20 at 09:16;  Status DC


Fentanyl Citrate (Fentanyl 5ml Vial) 250 mcg 1X  ONCE IV  Last administered on 

5/8/20at 09:30;  Start 5/8/20 at 09:15;  Stop 5/8/20 at 09:16;  Status DC


Bumetanide (Bumex) 2 mg DAILY IV  Last administered on 5/18/20at 08:07;  Start 

5/8/20 at 10:00;  Stop 5/18/20 at 17:15;  Status DC


Potassium Chloride 75 meq/ Magnesium Sulfate 15 meq/ Multivitamins 10 

ml/Chromium/ Copper/Manganese/ Seleni/Zn 0.5 ml/ Insulin Human Regular 15 unit/ 

Total Parenteral Nutrition/Amino Acids/Dextrose/ Fat Emulsion Intravenous 1,920 

ml @  80 mls/hr TPN  CONT IV  Last administered on 5/8/20at 21:59;  Start 5/8/20

at 22:00;  Stop 5/9/20 at 21:59;  Status DC


Metoclopramide HCl (Reglan Vial) 10 mg PRN Q3HRS  PRN IVP NAUSEA/VOMITING-3rd 

choice Last administered on 5/14/20at 04:25;  Start 5/9/20 at 16:45


Potassium Chloride 75 meq/ Magnesium Sulfate 15 meq/ Multivitamins 10 

ml/Chromium/ Copper/Manganese/ Seleni/Zn 0.5 ml/ Insulin Human Regular 15 unit/ 

Total Parenteral Nutrition/Amino Acids/Dextrose/ Fat Emulsion Intravenous 1,920 

ml @  80 mls/hr TPN  CONT IV  Last administered on 5/9/20at 22:41;  Start 5/9/20

at 22:00;  Stop 5/10/20 at 21:59;  Status DC


Magnesium Sulfate 50 ml @ 25 mls/hr 1X  ONCE IV  Last administered on 5/10/20at 

10:44;  Start 5/10/20 at 09:00;  Stop 5/10/20 at 10:59;  Status DC


Potassium Chloride/Water 100 ml @  100 mls/hr 1X  ONCE IV  Last administered on 

5/10/20at 09:37;  Start 5/10/20 at 09:00;  Stop 5/10/20 at 09:59;  Status DC


Duloxetine HCl (Cymbalta) 30 mg DAILY PO  Last administered on 5/11/20at 09:48; 

Start 5/10/20 at 14:00;  Stop 5/13/20 at 10:25;  Status DC


Potassium Chloride 80 meq/ Magnesium Sulfate 20 meq/ Multivitamins 10 

ml/Chromium/ Copper/Manganese/ Seleni/Zn 0.5 ml/ Insulin Human Regular 15 unit/ 

Total Parenteral Nutrition/Amino Acids/Dextrose/ Fat Emulsion Intravenous 1,920 

ml @  80 mls/hr TPN  CONT IV  Last administered on 5/10/20at 21:42;  Start 

5/10/20 at 22:00;  Stop 5/11/20 at 21:59;  Status DC


Potassium Chloride 80 meq/ Magnesium Sulfate 20 meq/ Multivitamins 10 

ml/Chromium/ Copper/Manganese/ Seleni/Zn 0.5 ml/ Insulin Human Regular 15 unit/ 

Total Parenteral Nutrition/Amino Acids/Dextrose/ Fat Emulsion Intravenous 1,920 

ml @  80 mls/hr TPN  CONT IV  Last administered on 5/11/20at 22:20;  Start 

5/11/20 at 22:00;  Stop 5/12/20 at 21:59;  Status DC


Lidocaine HCl (Buffered Lidocaine 1%) 3 ml STK-MED ONCE .ROUTE ;  Start 5/12/20 

at 09:54;  Stop 5/12/20 at 09:55;  Status DC


Hydromorphone HCl (Dilaudid Standard PCA) 12 mg STK-MED ONCE IV ;  Start 5/1/20 

at 15:50;  Stop 5/12/20 at 11:24;  Status DC


Potassium Chloride 80 meq/ Magnesium Sulfate 20 meq/ Multivitamins 10 

ml/Chromium/ Copper/Manganese/ Seleni/Zn 0.5 ml/ Insulin Human Regular 15 unit/ 

Total Parenteral Nutrition/Amino Acids/Dextrose/ Fat Emulsion Intravenous 1,920 

ml @  80 mls/hr TPN  CONT IV  Last administered on 5/12/20at 21:40;  Start 

5/12/20 at 22:00;  Stop 5/13/20 at 21:59;  Status DC


Lidocaine HCl (Buffered Lidocaine 1%) 6 ml 1X  ONCE INJ  Last administered on 

5/12/20at 14:15;  Start 5/12/20 at 14:15;  Stop 5/12/20 at 14:16;  Status DC


Potassium Chloride 80 meq/ Magnesium Sulfate 20 meq/ Multivitamins 10 

ml/Chromium/ Copper/Manganese/ Seleni/Zn 1 ml/ Insulin Human Regular 15 unit/ 

Total Parenteral Nutrition/Amino Acids/Dextrose/ Fat Emulsion Intravenous 1,920 

ml @  80 mls/hr TPN  CONT IV  Last administered on 5/13/20at 22:04;  Start 

5/13/20 at 22:00;  Stop 5/14/20 at 21:59;  Status DC


Potassium Chloride/Water 100 ml @  100 mls/hr 1X  ONCE IV  Last administered on 

5/14/20at 11:34;  Start 5/14/20 at 11:00;  Stop 5/14/20 at 11:59;  Status DC


Potassium Chloride 90 meq/ Magnesium Sulfate 20 meq/ Multivitamins 10 

ml/Chromium/ Copper/Manganese/ Seleni/Zn 1 ml/ Insulin Human Regular 15 unit/ 

Total Parenteral Nutrition/Amino Acids/Dextrose/ Fat Emulsion Intravenous 1,920 

ml @  80 mls/hr TPN  CONT IV  Last administered on 5/14/20at 22:57;  Start 

5/14/20 at 22:00;  Stop 5/15/20 at 21:59;  Status DC


Potassium Chloride 90 meq/ Magnesium Sulfate 20 meq/ Multivitamins 10 

ml/Chromium/ Copper/Manganese/ Seleni/Zn 1 ml/ Insulin Human Regular 15 unit/ 

Total Parenteral Nutrition/Amino Acids/Dextrose/ Fat Emulsion Intravenous 1,920 

ml @  80 mls/hr TPN  CONT IV  Last administered on 5/15/20at 22:48;  Start 

5/15/20 at 22:00;  Stop 5/16/20 at 21:59;  Status DC


Potassium Chloride 90 meq/ Magnesium Sulfate 20 meq/ Multivitamins 10 

ml/Chromium/ Copper/Manganese/ Seleni/Zn 1 ml/ Insulin Human Regular 15 unit/ 

Total Parenteral Nutrition/Amino Acids/Dextrose/ Fat Emulsion Intravenous 1,890 

ml @  78.75 mls/ hr TPN  CONT IV  Last administered on 5/16/20at 22:15;  Start 

5/16/20 at 22:00;  Stop 5/17/20 at 21:59;  Status DC


Linezolid/Dextrose 300 ml @  300 mls/hr Q12HR IV  Last administered on 5/19/20at

21:08;  Start 5/17/20 at 09:00;  Stop 5/20/20 at 08:11;  Status DC


Daptomycin 450 mg/ Sodium Chloride 50 ml @  100 mls/hr Q24H IV  Last 

administered on 5/20/20at 09:25;  Start 5/17/20 at 09:00;  Stop 5/21/20 at 

08:30;  Status DC


Potassium Chloride 90 meq/ Magnesium Sulfate 20 meq/ Multivitamins 10 

ml/Chromium/ Copper/Manganese/ Seleni/Zn 1 ml/ Insulin Human Regular 15 unit/ 

Total Parenteral Nutrition/Amino Acids/Dextrose/ Fat Emulsion Intravenous 1,890 

ml @  78.75 mls/ hr TPN  CONT IV  Last administered on 5/17/20at 21:34;  Start 

5/17/20 at 22:00;  Stop 5/18/20 at 21:59;  Status DC


Lorazepam (Ativan Inj) 2 mg STK-MED ONCE .ROUTE ;  Start 5/17/20 at 14:58;  Stop

5/17/20 at 14:58;  Status DC


Metoprolol Tartrate (Lopressor Vial) 5 mg 1X  ONCE IVP  Last administered on 

5/17/20at 15:31;  Start 5/17/20 at 15:15;  Stop 5/17/20 at 15:16;  Status DC


Lorazepam (Ativan Inj) 2 mg 1X  ONCE IVP  Last administered on 5/17/20at 15:30; 

Start 5/17/20 at 15:15;  Stop 5/17/20 at 15:16;  Status DC


Enoxaparin Sodium (Lovenox 40mg Syringe) 40 mg Q24H SQ  Last administered on 

6/5/20at 17:44;  Start 5/17/20 at 17:00;  Stop 6/7/20 at 06:50;  Status DC


Lorazepam (Ativan Inj) 1 mg PRN Q4HRS  PRN IVP ANXIETY / AGITATION MILD-MOD Last

administered on 5/31/20at 15:55;  Start 5/17/20 at 19:15;  Stop 6/2/20 at 11:45;

 Status DC


Lorazepam (Ativan Inj) 2 mg PRN Q4HRS  PRN IVP ANXIETY / AGITATION SEVERE Last 

administered on 6/1/20at 07:55;  Start 5/17/20 at 19:15;  Stop 6/2/20 at 11:45; 

Status DC


Fentanyl Citrate (Fentanyl 2ml Vial) 50 mcg PRN Q4HRS  PRN IVP SEVERE PAIN Last 

administered on 6/13/20at 05:15;  Start 5/18/20 at 13:15;  Stop 6/14/20 at 

09:29;  Status DC


Fentanyl Citrate (Fentanyl 2ml Vial) 25 mcg PRN Q4HRS  PRN IVP MODERATE PAIN 

Last administered on 6/13/20at 00:27;  Start 5/18/20 at 13:15;  Stop 6/14/20 at 

09:30;  Status DC


Potassium Chloride 90 meq/ Magnesium Sulfate 20 meq/ Multivitamins 10 

ml/Chromium/ Copper/Manganese/ Seleni/Zn 1 ml/ Insulin Human Regular 15 unit/ 

Total Parenteral Nutrition/Amino Acids/Dextrose/ Fat Emulsion Intravenous 1,890 

ml @  78.75 mls/ hr TPN  CONT IV  Last administered on 5/18/20at 22:18;  Start 

5/18/20 at 22:00;  Stop 5/19/20 at 21:59;  Status DC


Furosemide (Lasix) 40 mg 1X  ONCE IVP  Last administered on 5/18/20at 21:51;  

Start 5/18/20 at 21:45;  Stop 5/18/20 at 21:48;  Status DC


Albumin Human 100 ml @  100 mls/hr 1X PRN  PRN IV SEE COMMENTS;  Start 5/19/20 

at 01:30


Furosemide (Lasix) 40 mg [id number] IVP  Last administered on 6/3/20at 08:04;  Start 

5/19/20 at 14:00;  Stop 6/3/20 at 13:07;  Status DC


Potassium Chloride 90 meq/ Magnesium Sulfate 20 meq/ Multivitamins 10 m

l/Chromium/ Copper/Manganese/ Seleni/Zn 1 ml/ Insulin Human Regular 15 unit/ 

Total Parenteral Nutrition/Amino Acids/Dextrose/ Fat Emulsion Intravenous 1,800 

ml @  75 mls/hr TPN  CONT IV  Last administered on 5/19/20at 22:31;  Start 

5/19/20 at 22:00;  Stop 5/20/20 at 21:59;  Status DC


Potassium Chloride 90 meq/ Magnesium Sulfate 20 meq/ Multivitamins 10 

ml/Chromium/ Copper/Manganese/ Seleni/Zn 1 ml/ Insulin Human Regular 15 unit/ 

Total Parenteral Nutrition/Amino Acids/Dextrose/ Fat Emulsion Intravenous 1,800 

ml @  75 mls/hr TPN  CONT IV  Last administered on 5/20/20at 22:28;  Start 

5/20/20 at 22:00;  Stop 5/21/20 at 21:59;  Status DC


Potassium Chloride 110 meq/ Magnesium Sulfate 20 meq/ Multivitamins 10 

ml/Chromium/ Copper/Manganese/ Seleni/Zn 1 ml/ Insulin Human Regular 15 unit/ 

Total Parenteral Nutrition/Amino Acids/Dextrose/ Fat Emulsion Intravenous 1,800 

ml @  75 mls/hr TPN  CONT IV  Last administered on 5/21/20at 22:01;  Start 

5/21/20 at 22:00;  Stop 5/22/20 at 21:59;  Status DC


Saliva Substitute (Biotene Moisturizing Mouth) 2 spray PRN Q15MIN  PRN PO DRY 

MOUTH;  Start 5/21/20 at 11:00


Potassium Chloride 110 meq/ Magnesium Sulfate 20 meq/ Multivitamins 10 

ml/Chromium/ Copper/Manganese/ Seleni/Zn 1 ml/ Insulin Human Regular 15 unit/ 

Total Parenteral Nutrition/Amino Acids/Dextrose/ Fat Emulsion Intravenous 1,800 

ml @  75 mls/hr TPN  CONT IV  Last administered on 5/22/20at 22:21;  Start 

5/22/20 at 22:00;  Stop 5/23/20 at 21:59;  Status DC


Potassium Chloride 110 meq/ Magnesium Sulfate 20 meq/ Multivitamins 10 m

l/Chromium/ Copper/Manganese/ Seleni/Zn 1 ml/ Insulin Human Regular 15 unit/ 

Total Parenteral Nutrition/Amino Acids/Dextrose/ Fat Emulsion Intravenous 1,800 

ml @  75 mls/hr TPN  CONT IV  Last administered on 5/23/20at 22:04;  Start 

5/23/20 at 22:00;  Stop 5/24/20 at 21:59;  Status DC


Potassium Chloride 110 meq/ Magnesium Sulfate 20 meq/ Multivitamins 10 

ml/Chromium/ Copper/Manganese/ Seleni/Zn 1 ml/ Insulin Human Regular 15 unit/ 

Total Parenteral Nutrition/Amino Acids/Dextrose/ Fat Emulsion Intravenous 1,800 

ml @  75 mls/hr TPN  CONT IV  Last administered on 5/24/20at 22:48;  Start 

5/24/20 at 22:00;  Stop 5/25/20 at 21:59;  Status DC


Potassium Chloride 70 meq/ Magnesium Sulfate 20 meq/ Multivitamins 10 

ml/Chromium/ Copper/Manganese/ Seleni/Zn 1 ml/ Insulin Human Regular 15 unit/ 

Total Parenteral Nutrition/Amino Acids/Dextrose/ Fat Emulsion Intravenous 1,800 

ml @  75 mls/hr TPN  CONT IV  Last administered on 5/25/20at 21:39;  Start 

5/25/20 at 22:00;  Stop 5/26/20 at 21:59;  Status DC


Meropenem 500 mg/ Sodium Chloride 50 ml @  100 mls/hr Q6HRS IV  Last 

administered on 5/27/20at 06:02;  Start 5/25/20 at 18:00;  Stop 5/27/20 at 

09:59;  Status DC


Barium Sulfate (Varibar Thin Liquid Apple) 148 gm 1X  ONCE PO ;  Start 5/26/20 

at 11:45;  Stop 5/26/20 at 11:49;  Status DC


Potassium Chloride 70 meq/ Magnesium Sulfate 20 meq/ Multivitamins 10 

ml/Chromium/ Copper/Manganese/ Seleni/Zn 1 ml/ Insulin Human Regular 15 unit/ 

Total Parenteral Nutrition/Amino Acids/Dextrose/ Fat Emulsion Intravenous 1,800 

ml @  75 mls/hr TPN  CONT IV  Last administered on 5/26/20at 22:27;  Start 

5/26/20 at 22:00;  Stop 5/27/20 at 21:59;  Status DC


Piperacillin Sod/ Tazobactam Sod 3.375 gm/Sodium Chloride 50 ml @  100 mls/hr 

Q6HRS IV  Last administered on 6/4/20at 06:10;  Start 5/27/20 at 12:00;  Stop 

6/4/20 at 07:26;  Status DC


Potassium Chloride 70 meq/ Magnesium Sulfate 20 meq/ Multivitamins 10 

ml/Chromium/ Copper/Manganese/ Seleni/Zn 1 ml/ Insulin Human Regular 15 unit/ 

Total Parenteral Nutrition/Amino Acids/Dextrose/ Fat Emulsion Intravenous 1,800 

ml @  75 mls/hr TPN  CONT IV  Last administered on 5/27/20at 22:03;  Start 

5/27/20 at 22:00;  Stop 5/28/20 at 21:59;  Status DC


Potassium Chloride 70 meq/ Magnesium Sulfate 20 meq/ Multivitamins 10 

ml/Chromium/ Copper/Manganese/ Seleni/Zn 1 ml/ Insulin Human Regular 15 unit/ 

Total Parenteral Nutrition/Amino Acids/Dextrose/ Fat Emulsion Intravenous 1,800 

ml @  75 mls/hr TPN  CONT IV  Last administered on 5/28/20at 22:33;  Start 

5/28/20 at 22:00;  Stop 5/29/20 at 21:59;  Status DC


Potassium Chloride 70 meq/ Magnesium Sulfate 20 meq/ Multivitamins 10 

ml/Chromium/ Copper/Manganese/ Seleni/Zn 1 ml/ Insulin Human Regular 15 unit/ 

Total Parenteral Nutrition/Amino Acids/Dextrose/ Fat Emulsion Intravenous 1,800 

ml @  75 mls/hr TPN  CONT IV  Last administered on 5/29/20at 23:13;  Start 

5/29/20 at 22:00;  Stop 5/30/20 at 21:59;  Status DC


Potassium Chloride 80 meq/ Magnesium Sulfate 20 meq/ Multivitamins 10 

ml/Chromium/ Copper/Manganese/ Seleni/Zn 1 ml/ Insulin Human Regular 15 unit/ 

Total Parenteral Nutrition/Amino Acids/Dextrose/ Fat Emulsion Intravenous 1,800 

ml @  75 mls/hr TPN  CONT IV  Last administered on 5/30/20at 22:30;  Start 5/30/ 20 at 22:00;  Stop 5/31/20 at 21:59;  Status DC


Potassium Chloride 80 meq/ Magnesium Sulfate 20 meq/ Multivitamins 10 ml/Chromiu

m/ Copper/Manganese/ Seleni/Zn 1 ml/ Insulin Human Regular 15 unit/ Total 

Parenteral Nutrition/Amino Acids/Dextrose/ Fat Emulsion Intravenous 1,800 ml @  

75 mls/hr TPN  CONT IV  Last administered on 5/31/20at 21:54;  Start 5/31/20 at 

22:00;  Stop 6/1/20 at 21:59;  Status DC


Potassium Chloride/Water 100 ml @  100 mls/hr 1X  ONCE IV  Last administered on 

6/1/20at 10:15;  Start 6/1/20 at 10:00;  Stop 6/1/20 at 10:59;  Status DC


Potassium Chloride 90 meq/ Magnesium Sulfate 20 meq/ Multivitamins 10 

ml/Chromium/ Copper/Manganese/ Seleni/Zn 1 ml/ Insulin Human Regular 20 unit/ 

Total Parenteral Nutrition/Amino Acids/Dextrose/ Fat Emulsion Intravenous 1,800 

ml @  75 mls/hr TPN  CONT IV  Last administered on 6/1/20at 22:28;  Start 6/1/20

at 22:00;  Stop 6/2/20 at 21:59;  Status DC


Potassium Chloride 90 meq/ Magnesium Sulfate 20 meq/ Multivitamins 10 

ml/Chromium/ Copper/Manganese/ Seleni/Zn 1 ml/ Insulin Human Regular 20 unit/ 

Total Parenteral Nutrition/Amino Acids/Dextrose/ Fat Emulsion Intravenous 1,800 

ml @  75 mls/hr TPN  CONT IV  Last administered on 6/2/20at 22:08;  Start 6/2/20

at 22:00;  Stop 6/3/20 at 21:59;  Status DC


Lorazepam (Ativan Inj) 0.25 mg PRN Q4HRS  PRN IVP ANXIETY / AGITATION Last 

administered on 7/12/20at 00:27;  Start 6/3/20 at 07:30


Potassium Chloride 90 meq/ Magnesium Sulfate 20 meq/ Multivitamins 10 

ml/Chromium/ Copper/Manganese/ Seleni/Zn 1 ml/ Insulin Human Regular 20 unit/ 

Total Parenteral Nutrition/Amino Acids/Dextrose/ Fat Emulsion Intravenous 1,800 

ml @  75 mls/hr TPN  CONT IV  Last administered on 6/3/20at 23:13;  Start 6/3/20

at 22:00;  Stop 6/4/20 at 21:59;  Status DC


Furosemide (Lasix) 40 mg DAILY IVP  Last administered on 6/5/20at 11:14;  Start 

6/3/20 at 13:30;  Stop 6/7/20 at 09:12;  Status DC


Fluoxetine HCl (PROzac) 20 mg QHS PEG  Last administered on 7/18/20at 21:07;  

Start 6/4/20 at 21:00


Fentanyl (Duragesic 50mcg/ Hr Patch) 1 patch Q72H TD  Last administered on 

6/4/20at 21:22;  Start 6/4/20 at 21:00;  Stop 6/13/20 at 12:00;  Status DC


Potassium Chloride 40 meq/ Potassium Acetate 60 meq/Magnesium Sulfate 10 meq/ 

Multivitamins 10 ml/Chromium/ Copper/Manganese/ Seleni/Zn 1 ml/ Insulin Human 

Regular 20 unit/ Total Parenteral Nutrition/Amino Acids/Dextrose/ Fat Emulsion 

Intravenous 1,800 ml @  75 mls/hr TPN  CONT IV  Last administered on 6/5/20at 

00:03;  Start 6/4/20 at 22:00;  Stop 6/5/20 at 21:59;  Status DC


Potassium Acetate 80 meq/Magnesium Sulfate 5 meq/ Multivitamins 10 ml/Chromium/ 

Copper/Manganese/ Seleni/Zn 1 ml/ Insulin Human Regular 20 unit/ Total 

Parenteral Nutrition/Amino Acids/Dextrose/ Fat Emulsion Intravenous 1,920 ml @  

80 mls/hr TPN  CONT IV  Last administered on 6/5/20at 21:59;  Start 6/5/20 at 2

2:00;  Stop 6/6/20 at 21:59;  Status DC


Potassium Acetate 60 meq/Magnesium Sulfate 5 meq/ Multivitamins 10 ml/Chromium/ 

Copper/Manganese/ Seleni/Zn 1 ml/ Insulin Human Regular 30 unit/ Total 

Parenteral Nutrition/Amino Acids/Dextrose/ Fat Emulsion Intravenous 1,920 ml @  

80 mls/hr TPN  CONT IV  Last administered on 6/6/20at 21:54;  Start 6/6/20 at 

22:00;  Stop 6/7/20 at 21:59;  Status DC


Norepinephrine Bitartrate 8 mg/ Dextrose 258 ml @  13.332 mls/ hr CONT  PRN IV 

PER PROTOCOL Last administered on 7/2/20at 09:09;  Start 6/7/20 at 06:30


Albumin Human 500 ml @  125 mls/hr 1X  ONCE IV  Last administered on 6/7/20at 

08:10;  Start 6/7/20 at 08:15;  Stop 6/7/20 at 12:14;  Status DC


Potassium Acetate 40 meq/Magnesium Sulfate 5 meq/ Multivitamins 10 ml/Chromium/ 

Copper/Manganese/ Seleni/Zn 1 ml/ Insulin Human Regular 30 unit/ Total 

Parenteral Nutrition/Amino Acids/Dextrose/ Fat Emulsion Intravenous 1,920 ml @  

80 mls/hr TPN  CONT IV  Last administered on 6/7/20at 22:23;  Start 6/7/20 at 

22:00;  Stop 6/8/20 at 21:59;  Status DC


Meropenem 1 gm/ Sodium Chloride 100 ml @  200 mls/hr Q8HRS IV ;  Start 6/7/20 at

14:00;  Status Cancel


Meropenem 1 gm/ Sodium Chloride 100 ml @  200 mls/hr Q8HRS IV  Last administered

on 6/7/20at 11:04;  Start 6/7/20 at 10:00;  Stop 6/7/20 at 13:00;  Status DC


Meropenem 1 gm/ Sodium Chloride 100 ml @  200 mls/hr Q12HR IV  Last administered

on 6/25/20at 08:27;  Start 6/7/20 at 21:00;  Stop 6/25/20 at 08:56;  Status DC


Sodium Chloride 1,000 ml @  1,000 mls/hr 1X  ONCE IV  Last administered on 6/7/2

0at 11:06;  Start 6/7/20 at 10:45;  Stop 6/7/20 at 11:44;  Status DC


Micafungin Sodium 100 mg/Dextrose 100 ml @  100 mls/hr Q24H IV  Last 

administered on 6/24/20at 12:34;  Start 6/7/20 at 11:00;  Stop 6/25/20 at 08:56;

 Status DC


Daptomycin 410 mg/ Sodium Chloride 50 ml @  100 mls/hr Q24H IV  Last 

administered on 6/9/20at 13:33;  Start 6/7/20 at 14:00;  Stop 6/10/20 at 08:30; 

Status DC


Midazolam HCl (Versed) 2 mg STK-MED ONCE .ROUTE ;  Start 6/7/20 at 14:47;  Stop 

6/7/20 at 14:48;  Status DC


Fentanyl Citrate (Fentanyl 2ml Vial) 100 mcg STK-MED ONCE .ROUTE ;  Start 6/7/20

at 14:47;  Stop 6/7/20 at 14:48;  Status DC


Flumazenil (Romazicon) 0.5 mg STK-MED ONCE IV ;  Start 6/7/20 at 14:48;  Stop 

6/7/20 at 14:48;  Status DC


Naloxone HCl (Narcan) 0.4 mg STK-MED ONCE .ROUTE ;  Start 6/7/20 at 14:48;  Stop

6/7/20 at 14:48;  Status DC


Lidocaine HCl (Lidocaine 1% 20ml Vial) 20 ml STK-MED ONCE .ROUTE ;  Start 6/7/20

at 14:48;  Stop 6/7/20 at 14:48;  Status DC


Midazolam HCl (Versed) 2 mg 1X  ONCE IV  Last administered on 6/7/20at 15:28;  

Start 6/7/20 at 15:00;  Stop 6/7/20 at 15:01;  Status DC


Fentanyl Citrate (Fentanyl 2ml Vial) 100 mcg 1X  ONCE IV  Last administered on 

6/7/20at 15:28;  Start 6/7/20 at 15:00;  Stop 6/7/20 at 15:01;  Status DC


Lidocaine HCl (Lidocaine 1% 20ml Vial) 20 ml 1X  ONCE INJ  Last administered on 

6/7/20at 15:30;  Start 6/7/20 at 15:00;  Stop 6/7/20 at 15:01;  Status DC


Sodium Chloride 1,000 ml @  100 mls/hr Q10H IV  Last administered on 6/16/20at 

07:30;  Start 6/7/20 at 20:00;  Stop 6/16/20 at 11:26;  Status DC


Sodium Bicarbonate (Sodium Bicarb Adult 8.4% Syr) 50 meq 1X  ONCE IV  Last 

administered on 6/7/20at 21:47;  Start 6/7/20 at 22:00;  Stop 6/7/20 at 22:01;  

Status DC


Potassium Acetate 40 meq/Magnesium Sulfate 5 meq/ Multivitamins 10 ml/Chromium/ 

Copper/Manganese/ Seleni/Zn 1 ml/ Insulin Human Regular 30 unit/ Total 

Parenteral Nutrition/Amino Acids/Dextrose/ Fat Emulsion Intravenous 1,920 ml @  

80 mls/hr TPN  CONT IV  Last administered on 6/8/20at 22:28;  Start 6/8/20 at 

22:00;  Stop 6/9/20 at 21:59;  Status DC


Sodium Chloride 500 ml @  500 mls/hr 1X  ONCE IV  Last administered on 6/9/20at 

06:39;  Start 6/9/20 at 06:45;  Stop 6/9/20 at 07:44;  Status DC


Potassium Acetate 40 meq/Magnesium Sulfate 5 meq/ Multivitamins 10 ml/Chromium/ 

Copper/Manganese/ Seleni/Zn 1 ml/ Insulin Human Regular 30 unit/ Total 

Parenteral Nutrition/Amino Acids/Dextrose/ Fat Emulsion Intravenous 1,920 ml @  

80 mls/hr TPN  CONT IV  Last administered on 6/9/20at 22:03;  Start 6/9/20 at 

22:00;  Stop 6/10/20 at 21:59;  Status DC


Metoprolol Tartrate (Lopressor Vial) 5 mg PRN Q6HRS  PRN IVP HYPERTENSION Last 

administered on 7/18/20at 17:50;  Start 6/10/20 at 09:00


Potassium Acetate 40 meq/Magnesium Sulfate 5 meq/ Multivitamins 10 ml/Chromium/ 

Copper/Manganese/ Seleni/Zn 1 ml/ Insulin Human Regular 30 unit/ Total 

Parenteral Nutrition/Amino Acids/Dextrose/ Fat Emulsion Intravenous 1,920 ml @  

80 mls/hr TPN  CONT IV  Last administered on 6/10/20at 21:26;  Start 6/10/20 at 

22:00;  Stop 6/11/20 at 21:59;  Status DC


Potassium Acetate 40 meq/Magnesium Sulfate 5 meq/ Multivitamins 10 ml/Chromium/ 

Copper/Manganese/ Seleni/Zn 1 ml/ Insulin Human Regular 30 unit/ Total 

Parenteral Nutrition/Amino Acids/Dextrose/ Fat Emulsion Intravenous 1,920 ml @  

80 mls/hr TPN  CONT IV  Last administered on 6/11/20at 23:23;  Start 6/11/20 at 

22:00;  Stop 6/12/20 at 21:59;  Status DC


Potassium Acetate 40 meq/Magnesium Sulfate 5 meq/ Multivitamins 10 ml/Chromium/ 

Copper/Manganese/ Seleni/Zn 1 ml/ Insulin Human Regular 30 unit/ Total 

Parenteral Nutrition/Amino Acids/Dextrose/ Fat Emulsion Intravenous 1,920 ml @  

80 mls/hr TPN  CONT IV  Last administered on 6/12/20at 21:35;  Start 6/12/20 at 

22:00;  Stop 6/13/20 at 21:59;  Status DC


Furosemide (Lasix) 20 mg 1X  ONCE IVP  Last administered on 6/13/20at 06:26;  

Start 6/13/20 at 06:15;  Stop 6/13/20 at 06:16;  Status DC


Methylprednisolone Sodium Succinate (SOLU-Medrol 125MG VIAL) 125 mg 1X  ONCE IV 

Last administered on 6/13/20at 06:26;  Start 6/13/20 at 06:15;  Stop 6/13/20 at 

06:16;  Status DC


Albuterol/ Ipratropium (Duoneb) 3 ml Q4HRS NEB  Last administered on 7/19/20at 

08:37;  Start 6/13/20 at 08:00


Fentanyl Citrate 30 ml @ 0 mls/hr CONT  PRN IV SEE PROTOCOL Last administered on

7/4/20at 08:03;  Start 6/13/20 at 06:00;  Stop 7/4/20 at 12:42;  Status DC


Propofol 100 ml @ 0 mls/hr CONT  PRN IV SEE PROTOCOL Last administered on 

6/20/20at 23:50;  Start 6/13/20 at 06:00


Fentanyl Citrate (Fentanyl 2ml Vial) 25 mcg PRN Q1HR  PRN IV SEE COMMENTS Last 

administered on 7/18/20at 21:08;  Start 6/13/20 at 06:00


Fentanyl Citrate (Fentanyl 2ml Vial) 50 mcg PRN Q1HR  PRN IV SEE COMMENTS Last 

administered on 7/12/20at 18:02;  Start 6/13/20 at 06:00


Chlorhexidine Gluconate (Peridex) 15 ml BID MM ;  Start 6/13/20 at 09:00;  Stop 

6/13/20 at 07:58;  Status DC


Potassium Acetate 40 meq/Magnesium Sulfate 5 meq/ Multivitamins 10 ml/Chromium/ 

Copper/Manganese/ Seleni/Zn 1 ml/ Insulin Human Regular 30 unit/ Total Parent

eral Nutrition/Amino Acids/Dextrose/ Fat Emulsion Intravenous 1,920 ml @  80 

mls/hr TPN  CONT IV  Last administered on 6/13/20at 21:19;  Start 6/13/20 at 

22:00;  Stop 6/14/20 at 21:59;  Status DC


Acetylcysteine (Mucomyst 20% Resp Treatment) 600 mg BID NEB  Last administered 

on 6/19/20at 09:33;  Start 6/13/20 at 21:00;  Stop 6/19/20 at 10:39;  Status DC


Magnesium Sulfate 100 ml @  25 mls/hr 1X  ONCE IV  Last administered on 

6/13/20at 15:48;  Start 6/13/20 at 15:45;  Stop 6/13/20 at 19:44;  Status DC


Potassium Acetate 40 meq/Magnesium Sulfate 5 meq/ Multivitamins 10 ml/Chromium/ 

Copper/Manganese/ Seleni/Zn 1 ml/ Insulin Human Regular 30 unit/ Total 

Parenteral Nutrition/Amino Acids/Dextrose/ Fat Emulsion Intravenous 1,920 ml @  

80 mls/hr TPN  CONT IV  Last administered on 6/14/20at 21:35;  Start 6/14/20 at 

22:00;  Stop 6/15/20 at 21:59;  Status DC


Potassium Chloride/Water 100 ml @  100 mls/hr Q1H IV  Last administered on 

6/15/20at 08:31;  Start 6/15/20 at 07:00;  Stop 6/15/20 at 08:59;  Status DC


Potassium Acetate 40 meq/Magnesium Sulfate 5 meq/ Multivitamins 10 ml/Chromium/ 

Copper/Manganese/ Seleni/Zn 1 ml/ Insulin Human Regular 30 unit/ Total 

Parenteral Nutrition/Amino Acids/Dextrose/ Fat Emulsion Intravenous 1,920 ml @  

80 mls/hr TPN  CONT IV  Last administered on 6/15/20at 21:54;  Start 6/15/20 at 

22:00;  Stop 6/16/20 at 19:34;  Status DC


Lidocaine HCl (Buffered Lidocaine 1%) 3 ml STK-MED ONCE .ROUTE ;  Start 6/15/20 

at 12:14;  Stop 6/15/20 at 12:14;  Status DC


Lidocaine HCl (Buffered Lidocaine 1%) 3 ml 1X  ONCE IJ  Last administered on 

6/15/20at 13:11;  Start 6/15/20 at 13:00;  Stop 6/15/20 at 13:01;  Status DC


Magnesium Sulfate 50 ml @ 25 mls/hr 1X  ONCE IV ;  Start 6/16/20 at 08:15;  Stop

6/16/20 at 10:14;  Status DC


Potassium Acetate 40 meq/Magnesium Sulfate 10 meq/ Multivitamins 10 ml/Chromium/

Copper/Manganese/ Seleni/Zn 1 ml/ Insulin Human Regular 20 unit/ Total 

Parenteral Nutrition/Amino Acids/Dextrose/ Fat Emulsion Intravenous 1,920 ml @  

80 mls/hr TPN  CONT IV  Last administered on 6/16/20at 21:32;  Start 6/16/20 at 

22:00;  Stop 6/17/20 at 21:59;  Status DC


Potassium Chloride/Water 100 ml @  100 mls/hr Q1H IV  Last administered on 

6/17/20at 09:12;  Start 6/17/20 at 08:00;  Stop 6/17/20 at 09:59;  Status DC


Alteplase, Recombinant (Cathflo For Central Catheter Clearance) 4 mg 1X  ONCE 

INT CAT ;  Start 6/17/20 at 09:15;  Stop 6/17/20 at 09:16;  Status UNV


Alteplase, Recombinant (Cathflo For Central Catheter Clearance) 4 mg 1X  ONCE 

INT CAT ;  Start 6/17/20 at 09:15;  Stop 6/17/20 at 09:16;  Status UNV


Alteplase, Recombinant (Cathflo For Central Catheter Clearance) 4 mg 1X  ONCE 

INT CAT ;  Start 6/17/20 at 09:15;  Stop 6/17/20 at 09:16;  Status UNV


Alteplase, Recombinant 4 mg/ Sodium Chloride 20 ml @ 20 mls/hr 1X  ONCE IV  Last

administered on 6/17/20at 10:10;  Start 6/17/20 at 10:00;  Stop 6/17/20 at 

10:59;  Status DC


Alteplase, Recombinant 4 mg/ Sodium Chloride 20 ml @ 20 mls/hr 1X  ONCE IV  Last

administered on 6/17/20at 10:09;  Start 6/17/20 at 10:00;  Stop 6/17/20 at 

10:59;  Status DC


Alteplase, Recombinant 4 mg/ Sodium Chloride 20 ml @ 20 mls/hr 1X  ONCE IV  Last

administered on 6/17/20at 10:09;  Start 6/17/20 at 10:00;  Stop 6/17/20 at 

10:59;  Status DC


Potassium Acetate 60 meq/Magnesium Sulfate 10 meq/ Multivitamins 10 ml/Chromium/

Copper/Manganese/ Seleni/Zn 1 ml/ Insulin Human Regular 20 unit/ Total 

Parenteral Nutrition/Amino Acids/Dextrose/ Fat Emulsion Intravenous 1,920 ml @  

80 mls/hr TPN  CONT IV  Last administered on 6/17/20at 21:55;  Start 6/17/20 at 

22:00;  Stop 6/18/20 at 21:59;  Status DC


Albumin Human 500 ml @  125 mls/hr 1X  ONCE IV  Last administered on 6/18/20at 

12:01;  Start 6/18/20 at 11:15;  Stop 6/18/20 at 15:14;  Status DC


Sodium Chloride 500 ml @  500 mls/hr 1X  ONCE IV  Last administered on 6/18/20at

13:50;  Start 6/18/20 at 11:15;  Stop 6/18/20 at 12:14;  Status DC


Potassium Acetate 60 meq/Magnesium Sulfate 14 meq/ Multivitamins 10 ml/Chromium/

Copper/Manganese/ Seleni/Zn 1 ml/ Insulin Human Regular 20 unit/ Total 

Parenteral Nutrition/Amino Acids/Dextrose/ Fat Emulsion Intravenous 1,920 ml @  

80 mls/hr TPN  CONT IV  Last administered on 6/18/20at 22:26;  Start 6/18/20 at 

22:00;  Stop 6/19/20 at 21:59;  Status DC


Ciprofloxacin/ Dextrose 200 ml @  200 mls/hr Q12HR IV  Last administered on 

6/25/20at 08:27;  Start 6/18/20 at 21:00;  Stop 6/25/20 at 08:56;  Status DC


Albumin Human 250 ml @  62.5 mls/hr 1X  ONCE IV  Last administered on 6/19/20at 

11:09;  Start 6/19/20 at 11:00;  Stop 6/19/20 at 14:59;  Status DC


Furosemide (Lasix) 20 mg 1X  ONCE IVP  Last administered on 6/19/20at 14:52;  St

art 6/19/20 at 10:45;  Stop 6/19/20 at 10:49;  Status DC


Potassium Acetate 60 meq/Magnesium Sulfate 14 meq/ Multivitamins 10 ml/Chromium/

Copper/Manganese/ Seleni/Zn 1 ml/ Insulin Human Regular 15 unit/ Total 

Parenteral Nutrition/Amino Acids/Dextrose/ Fat Emulsion Intravenous 1,920 ml @  

80 mls/hr TPN  CONT IV  Last administered on 6/19/20at 22:08;  Start 6/19/20 at 

22:00;  Stop 6/20/20 at 21:59;  Status DC


Potassium Acetate 60 meq/Magnesium Sulfate 14 meq/ Multivitamins 10 ml/Chromium/

Copper/Manganese/ Seleni/Zn 1 ml/ Insulin Human Regular 15 unit/ Total 

Parenteral Nutrition/Amino Acids/Dextrose/ Fat Emulsion Intravenous 1,920 ml @  

80 mls/hr TPN  CONT IV  Last administered on 6/20/20at 22:12;  Start 6/20/20 at 

22:00;  Stop 6/21/20 at 21:59;  Status DC


Potassium Acetate 60 meq/Magnesium Sulfate 14 meq/ Multivitamins 10 ml/Chromium/

Copper/Manganese/ Seleni/Zn 1 ml/ Insulin Human Regular 15 unit/ Total 

Parenteral Nutrition/Amino Acids/Dextrose/ Fat Emulsion Intravenous 1,920 ml @  

80 mls/hr TPN  CONT IV  Last administered on 6/21/20at 22:22;  Start 6/21/20 at 

22:00;  Stop 6/22/20 at 21:59;  Status DC


Furosemide (Lasix) 20 mg 1X  ONCE IVP  Last administered on 6/22/20at 11:07;  

Start 6/22/20 at 10:30;  Stop 6/22/20 at 10:34;  Status DC


Potassium Acetate 60 meq/Magnesium Sulfate 14 meq/ Multivitamins 10 ml/Chromium/

Copper/Manganese/ Seleni/Zn 1 ml/ Insulin Human Regular 15 unit/ Sodium Chloride

20 meq/Total Parenteral Nutrition/Amino Acids/Dextrose/ Fat Emulsion Intravenous

1,920 ml @  80 mls/hr TPN  CONT IV  Last administered on 6/22/20at 21:54;  Start

6/22/20 at 22:00;  Stop 6/23/20 at 21:59;  Status DC


Potassium Acetate 30 meq/Magnesium Sulfate 14 meq/ Multivitamins 10 ml/Chromium/

Copper/Manganese/ Seleni/Zn 1 ml/ Insulin Human Regular 15 unit/ Sodium Chloride

20 meq/Potassium Chloride 30 meq/ Total Parenteral Nutrition/Amino 

Acids/Dextrose/ Fat Emulsion Intravenous 1,920 ml @  80 mls/hr TPN  CONT IV  

Last administered on 6/23/20at 21:46;  Start 6/23/20 at 22:00;  Stop 6/24/20 at 

21:59;  Status DC


Sodium Chloride 80 meq/Potassium Chloride 30 meq/ Potassium Acetate 30 

meq/Magnesium Sulfate 14 meq/ Multivitamins 10 ml/Chromium/ Copper/Manganese/ 

Seleni/Zn 1 ml/ Insulin Human Regular 15 unit/ Total Parenteral Nutrition/Amino 

Acids/Dextrose/ Fat Emulsion Intravenous 1,920 ml @  80 mls/hr TPN  CONT IV  

Last administered on 6/24/20at 22:33;  Start 6/24/20 at 22:00;  Stop 6/25/20 at 

21:59;  Status DC


Furosemide (Lasix) 40 mg 1X  ONCE IVP  Last administered on 6/24/20at 16:27;  

Start 6/24/20 at 15:30;  Stop 6/24/20 at 15:33;  Status DC


Albumin Human 250 ml @  62.5 mls/hr 1X  ONCE IV  Last administered on 6/24/20at 

16:27;  Start 6/24/20 at 15:30;  Stop 6/24/20 at 19:29;  Status DC


Sodium Chloride 80 meq/Potassium Chloride 30 meq/ Potassium Acetate 30 

meq/Magnesium Sulfate 14 meq/ Multivitamins 10 ml/Chromium/ Copper/Manganese/ 

Seleni/Zn 1 ml/ Insulin Human Regular 15 unit/ Total Parenteral Nutrition/Amino 

Acids/Dextrose/ Fat Emulsion Intravenous 1,920 ml @  80 mls/hr TPN  CONT IV  

Last administered on 6/25/20at 22:25;  Start 6/25/20 at 22:00;  Stop 6/26/20 at 

21:59;  Status DC


Sodium Chloride 80 meq/Potassium Chloride 30 meq/ Potassium Acetate 30 

meq/Magnesium Sulfate 14 meq/ Multivitamins 10 ml/Chromium/ Copper/Manganese/ 

Seleni/Zn 1 ml/ Insulin Human Regular 15 unit/ Total Parenteral Nutrition/Amino 

Acids/Dextrose/ Fat Emulsion Intravenous 1,920 ml @  80 mls/hr TPN  CONT IV  

Last administered on 6/26/20at 21:32;  Start 6/26/20 at 22:00;  Stop 6/27/20 at 

21:59;  Status DC


Sodium Chloride 80 meq/Potassium Chloride 30 meq/ Potassium Acetate 30 

meq/Magnesium Sulfate 14 meq/ Multivitamins 10 ml/Chromium/ Copper/Manganese/ 

Seleni/Zn 1 ml/ Insulin Human Regular 15 unit/ Total Parenteral Nutrition/Amino 

Acids/Dextrose/ Fat Emulsion Intravenous 1,920 ml @  80 mls/hr TPN  CONT IV  L

ast administered on 6/27/20at 21:53;  Start 6/27/20 at 22:00;  Stop 6/28/20 at 

21:59;  Status DC


Acetylcysteine (Mucomyst 20% Resp Treatment) 600 mg RTBID NEB  Last administered

on 7/19/20at 08:41;  Start 6/27/20 at 12:00


Sodium Chloride 80 meq/Potassium Chloride 30 meq/ Potassium Acetate 30 

meq/Magnesium Sulfate 14 meq/ Multivitamins 10 ml/Chromium/ Copper/Manganese/ 

Seleni/Zn 1 ml/ Insulin Human Regular 15 unit/ Total Parenteral Nutrition/Amino 

Acids/Dextrose/ Fat Emulsion Intravenous 1,920 ml @  80 mls/hr TPN  CONT IV  

Last administered on 6/28/20at 22:06;  Start 6/28/20 at 22:00;  Stop 6/29/20 at 

21:59;  Status DC


Meropenem 500 mg/ Sodium Chloride 50 ml @  100 mls/hr Q6HRS IV  Last 

administered on 7/19/20at 08:23;  Start 6/28/20 at 18:00


Daptomycin 500 mg/ Sodium Chloride 50 ml @  100 mls/hr Q24H IV  Last 

administered on 7/6/20at 21:47;  Start 6/28/20 at 19:00;  Stop 7/7/20 at 08:13; 

Status DC


Sodium Chloride 80 meq/Potassium Chloride 30 meq/ Potassium Acetate 30 

meq/Magnesium Sulfate 14 meq/ Multivitamins 10 ml/Chromium/ Copper/Manganese/ 

Seleni/Zn 1 ml/ Insulin Human Regular 15 unit/ Total Parenteral Nutrition/Amino 

Acids/Dextrose/ Fat Emulsion Intravenous 1,920 ml @  80 mls/hr TPN  CONT IV  

Last administered on 6/29/20at 22:09;  Start 6/29/20 at 22:00;  Stop 6/30/20 at 

21:59;  Status DC


Heparin Sodium (Porcine) 1000 unit/Sodium Chloride 1,001 ml @  1,001 mls/hr 1X  

ONCE IRR ;  Start 6/30/20 at 06:00;  Stop 6/30/20 at 06:59;  Status DC


Propofol (Diprivan) 200 mg STK-MED ONCE IV ;  Start 6/30/20 at 07:44;  Stop 

6/30/20 at 07:44;  Status DC


Lidocaine HCl (Lidocaine Pf 2% Vial) 5 ml STK-MED ONCE .ROUTE ;  Start 6/30/20 

at 07:44;  Stop 6/30/20 at 07:44;  Status DC


Fentanyl Citrate (Fentanyl 2ml Vial) 100 mcg STK-MED ONCE .ROUTE ;  Start 

6/30/20 at 07:44;  Stop 6/30/20 at 07:44;  Status DC


Rocuronium Bromide (Zemuron) 100 mg STK-MED ONCE .ROUTE ;  Start 6/30/20 at 

07:44;  Stop 6/30/20 at 07:44;  Status DC


Micafungin Sodium 100 mg/Dextrose 100 ml @  100 mls/hr Q24H IV  Last 

administered on 7/19/20at 08:24;  Start 6/30/20 at 08:30


Bupivacaine HCl/ Epinephrine Bitart (Sensorcain-Epi 0.5%-1:[id number] Mpf) 30 ml 

STK-MED ONCE .ROUTE ;  Start 6/30/20 at 08:34;  Stop 6/30/20 at 08:35;  Status 

DC


Iohexol (Omnipaque 300 Mg/ml) 50 ml STK-MED ONCE .ROUTE  Last administered on 

6/30/20at 13:30;  Start 6/30/20 at 08:35;  Stop 6/30/20 at 08:35;  Status DC


Sodium Chloride 80 meq/Potassium Chloride 30 meq/ Potassium Acetate 30 

meq/Magnesium Sulfate 14 meq/ Multivitamins 10 ml/Chromium/ Copper/Manganese/ 

Seleni/Zn 1 ml/ Insulin Human Regular 15 unit/ Total Parenteral Nutrition/Amino 

Acids/Dextrose/ Fat Emulsion Intravenous 1,920 ml @  80 mls/hr TPN  CONT IV  L

ast administered on 7/1/20at 01:22;  Start 6/30/20 at 22:00;  Stop 7/1/20 at 

21:59;  Status DC


Phenylephrine HCl (Ken-Synephrine Inj) 10 mg STK-MED ONCE .ROUTE ;  Start 

6/30/20 at 10:15;  Stop 6/30/20 at 10:15;  Status DC


Desflurane (Suprane) 90 ml STK-MED ONCE IH ;  Start 6/30/20 at 10:18;  Stop 

6/30/20 at 10:19;  Status DC


Albumin Human 500 ml @  As Directed STK-MED ONCE IV ;  Start 6/30/20 at 11:06;  

Stop 6/30/20 at 11:06;  Status DC


Vasopressin (Vasostrict) 20 unit STK-MED ONCE .ROUTE ;  Start 6/30/20 at 12:23; 

Stop 6/30/20 at 12:23;  Status DC


Phenylephrine HCl (Ken-Synephrine Inj) 10 mg STK-MED ONCE .ROUTE ;  Start 

6/30/20 at 13:33;  Stop 6/30/20 at 13:33;  Status DC


Phenylephrine HCl (Ken-Synephrine Inj) 10 mg STK-MED ONCE .ROUTE ;  Start 

6/30/20 at 13:33;  Stop 6/30/20 at 13:33;  Status DC


Ondansetron HCl (Zofran) 4 mg STK-MED ONCE .ROUTE ;  Start 6/30/20 at 13:33;  

Stop 6/30/20 at 13:33;  Status DC


Enoxaparin Sodium (Lovenox 40mg Syringe) 40 mg Q24H SQ  Last administered on 

7/19/20at 08:23;  Start 7/1/20 at 08:00


Sodium Chloride (Normal Saline Flush) 3 ml QSHIFT  PRN IV AFTER MEDS AND BLOOD 

DRAWS;  Start 6/30/20 at 14:45


Naloxone HCl (Narcan) 0.4 mg PRN Q2MIN  PRN IV SEE INSTRUCTIONS;  Start 6/30/20 

at 14:45


Sodium Chloride 1,000 ml @  25 mls/hr Q24H IV  Last administered on 7/18/20at 

14:33;  Start 6/30/20 at 14:33


Morphine Sulfate (Morphine Sulfate) 1 mg PRN Q1HR  PRN IV PAIN;  Start 6/30/20 

at 14:45


Midazolam HCl 100 mg/Sodium Chloride 100 ml @ 1 mls/hr CONT  PRN IV SEE I/O 

RECORD Last administered on 7/3/20at 18:48;  Start 6/30/20 at 14:45


Phenylephrine HCl (PHENYLEPHRINE in 0.9% NACL PF) 1 mg STK-MED ONCE IV ;  Start 

6/30/20 at 14:44;  Stop 6/30/20 at 14:45;  Status DC


Ephedrine Sulfate (ePHEDrine PF IN SALINE SYRINGE) 50 mg STK-MED ONCE IV ;  Star

t 6/30/20 at 14:45;  Stop 6/30/20 at 14:45;  Status DC


Vasopressin 20 unit/Dextrose 101 ml @  12 mls/hr CONT  PRN IV SEE I/O RECORD 

Last administered on 7/7/20at 04:17;  Start 6/30/20 at 15:30


Sodium Chloride 1,000 ml @  1,000 mls/hr 1X  ONCE IV  Last administered on 

6/30/20at 15:42;  Start 6/30/20 at 15:45;  Stop 6/30/20 at 16:44;  Status DC


Albumin Human 500 ml @  125 mls/hr 1X  ONCE IV ;  Start 6/30/20 at 16:00;  Stop 

6/30/20 at 19:59;  Status DC


Albumin Human 500 ml @  125 mls/hr PRN Q1HR  PRN IV PER PROTOCOL;  Start 6/30/20

at 15:45


Magnesium Sulfate 50 ml @ 25 mls/hr 1X  ONCE IV  Last administered on 6/30/20at 

17:02;  Start 6/30/20 at 16:30;  Stop 6/30/20 at 18:29;  Status DC


Sodium Bicarbonate (Sodium Bicarb Adult 8.4% Syr) 50 meq STK-MED ONCE .ROUTE ;  

Start 6/30/20 at 16:20;  Stop 6/30/20 at 16:20;  Status DC


Sodium Bicarbonate (Sodium Bicarb Adult 8.4% Syr) 100 meq 1X  ONCE IV  Last 

administered on 6/30/20at 17:07;  Start 6/30/20 at 16:30;  Stop 6/30/20 at 

16:31;  Status DC


Sodium Bicarbonate 150 meq/Dextrose 1,150 ml @  75 mls/hr 1X  ONCE IV  Last 

administered on 6/30/20at 20:02;  Start 6/30/20 at 16:30;  Stop 7/1/20 at 07:49;

 Status DC


Sodium Chloride 80 meq/Potassium Chloride 30 meq/ Potassium Acetate 30 

meq/Magnesium Sulfate 14 meq/ Multivitamins 10 ml/Chromium/ Copper/Manganese/ 

Seleni/Zn 1 ml/ Insulin Human Regular 15 unit/ Total Parenteral Nutrition/Amino 

Acids/Dextrose/ Fat Emulsion Intravenous 1,920 ml @  80 mls/hr TPN  CONT IV  

Last administered on 7/1/20at 23:05;  Start 7/1/20 at 22:00;  Stop 7/2/20 at 

21:59;  Status DC


Sodium Chloride 100 meq/Potassium Chloride 30 meq/ Potassium Acetate 30 

meq/Magnesium Sulfate 12 meq/ Multivitamins 10 ml/Chromium/ Copper/Manganese/ 

Seleni/Zn 1 ml/ Insulin Human Regular 15 unit/ Total Parenteral Nutrition/Amino 

Acids/Dextrose/ Fat Emulsion Intravenous 1,920 ml @  80 mls/hr TPN  CONT IV  

Last administered on 7/2/20at 21:52;  Start 7/2/20 at 22:00;  Stop 7/3/20 at 

21:59;  Status DC


Sodium Chloride 100 meq/Potassium Chloride 30 meq/ Potassium Acetate 30 meq/Ma

gnesium Sulfate 12 meq/ Multivitamins 10 ml/Chromium/ Copper/Manganese/ 

Seleni/Zn 1 ml/ Insulin Human Regular 15 unit/ Total Parenteral Nutrition/Amino 

Acids/Dextrose/ Fat Emulsion Intravenous 1,920 ml @  80 mls/hr TPN  CONT IV  

Last administered on 7/3/20at 21:46;  Start 7/3/20 at 22:00;  Stop 7/4/20 at 

21:59;  Status DC


Sodium Chloride 100 meq/Potassium Chloride 30 meq/ Potassium Acetate 30 

meq/Magnesium Sulfate 12 meq/ Multivitamins 10 ml/Chromium/ Copper/Manganese/ 

Seleni/Zn 1 ml/ Insulin Human Regular 15 unit/ Total Parenteral Nutrition/Amino 

Acids/Dextrose/ Fat Emulsion Intravenous 1,800 ml @  75 mls/hr TPN  CONT IV  

Last administered on 7/4/20at 22:04;  Start 7/4/20 at 22:00;  Stop 7/5/20 at 

21:59;  Status DC


Fentanyl Citrate 55 ml @ 0 mls/hr CONT  PRN IV SEE COMMENTS Last administered on

7/6/20at 23:55;  Start 7/4/20 at 13:00;  Stop 7/9/20 at 17:28;  Status DC


Sodium Chloride 100 meq/Potassium Chloride 30 meq/ Potassium Acetate 30 

meq/Magnesium Sulfate 12 meq/ Multivitamins 10 ml/Chromium/ Copper/Manganese/ 

Seleni/Zn 1 ml/ Insulin Human Regular 15 unit/ Total Parenteral Nutrition/Amino 

Acids/Dextrose/ Fat Emulsion Intravenous 1,680 ml @  70 mls/hr TPN  CONT IV  

Last administered on 7/5/20at 21:23;  Start 7/5/20 at 22:00;  Stop 7/6/20 at 

21:59;  Status DC


Sodium Chloride 110 meq/Potassium Chloride 30 meq/ Potassium Acetate 30 

meq/Magnesium Sulfate 15 meq/ Multivitamins 10 ml/Chromium/ Copper/Manganese/ 

Seleni/Zn 1 ml/ Insulin Human Regular 15 unit/ Total Parenteral Nutrition/Amino 

Acids/Dextrose/ Fat Emulsion Intravenous 1,680 ml @  70 mls/hr TPN  CONT IV  

Last administered on 7/6/20at 21:48;  Start 7/6/20 at 22:00;  Stop 7/7/20 at 

21:59;  Status DC


Sodium Chloride 110 meq/Potassium Chloride 30 meq/ Potassium Acetate 30 

meq/Magnesium Sulfate 15 meq/ Multivitamins 10 ml/Chromium/ Copper/Manganese/ 

Seleni/Zn 1 ml/ Insulin Human Regular 15 unit/ Total Parenteral Nutrition/Amino 

Acids/Dextrose/ Fat Emulsion Intravenous 1,680 ml @  70 mls/hr TPN  CONT IV  

Last administered on 7/7/20at 21:33;  Start 7/7/20 at 22:00;  Stop 7/8/20 at 

21:59;  Status DC


Sodium Chloride 110 meq/Potassium Chloride 30 meq/ Potassium Acetate 30 

meq/Magnesium Sulfate 15 meq/ Multivitamins 10 ml/Chromium/ Copper/Manganese/ 

Seleni/Zn 1 ml/ Insulin Human Regular 15 unit/ Total Parenteral Nutrition/Amino 

Acids/Dextrose/ Fat Emulsion Intravenous 1,680 ml @  70 mls/hr TPN  CONT IV  

Last administered on 7/8/20at 21:51;  Start 7/8/20 at 22:00;  Stop 7/9/20 at 

21:59;  Status DC


Sodium Chloride 90 meq/Potassium Chloride 30 meq/ Potassium Acetate 30 

meq/Magnesium Sulfate 15 meq/ Multivitamins 10 ml/Chromium/ Copper/Manganese/ 

Seleni/Zn 1 ml/ Insulin Human Regular 15 unit/ Total Parenteral Nutrition/Amino 

Acids/Dextrose/ Fat Emulsion Intravenous 1,680 ml @  70 mls/hr TPN  CONT IV  

Last administered on 7/9/20at 22:38;  Start 7/9/20 at 22:00;  Stop 7/10/20 at 

21:59;  Status DC


Fentanyl Citrate 30 ml @ 0 mls/hr CONT  PRN IV SEE I/O RECORD;  Start 7/9/20 at 

17:30


Fentanyl (Duragesic 12mcg/ Hr Patch) 1 patch Q3DAYS TD  Last administered on 

7/19/20at 09:00;  Start 7/10/20 at 09:00


Sodium Chloride 90 meq/Potassium Chloride 30 meq/ Potassium Acetate 30 

meq/Magnesium Sulfate 15 meq/ Multivitamins 10 ml/Chromium/ Copper/Manganese/ 

Seleni/Zn 1 ml/ Insulin Human Regular 15 unit/ Total Parenteral Nutrition/Amino 

Acids/Dextrose/ Fat Emulsion Intravenous 1,680 ml @  70 mls/hr TPN  CONT IV  

Last administered on 7/10/20at 21:59;  Start 7/10/20 at 22:00;  Stop 7/11/20 at 

21:59;  Status DC


Sodium Chloride 90 meq/Potassium Chloride 30 meq/ Potassium Acetate 30 

meq/Magnesium Sulfate 15 meq/ Multivitamins 10 ml/Chromium/ Copper/Manganese/ 

Seleni/Zn 1 ml/ Insulin Human Regular 15 unit/ Total Parenteral Nutrition/Amino 

Acids/Dextrose/ Fat Emulsion Intravenous 1,680 ml @  70 mls/hr TPN  CONT IV  

Last administered on 7/11/20at 21:35;  Start 7/11/20 at 22:00;  Stop 7/12/20 at 

21:59;  Status DC


Vancomycin HCl (Vanco Per Pharmacy) 1 each PRN DAILY  PRN MC SEE COMMENTS Last 

administered on 7/14/20at 02:46;  Start 7/12/20 at 09:15;  Stop 7/15/20 at 

07:41;  Status DC


Ciprofloxacin/ Dextrose 200 ml @  200 mls/hr Q12HR IV  Last administered on 

7/19/20at 08:24;  Start 7/12/20 at 10:00


Vancomycin HCl 2 gm/Sodium Chloride 500 ml @  250 mls/hr 1X  ONCE IV  Last 

administered on 7/12/20at 10:34;  Start 7/12/20 at 10:00;  Stop 7/12/20 at 

11:59;  Status DC


Sodium Chloride 90 meq/Potassium Chloride 30 meq/ Potassium Acetate 30 

meq/Magnesium Sulfate 15 meq/ Multivitamins 10 ml/Chromium/ Copper/Manganese/ 

Seleni/Zn 1 ml/ Insulin Human Regular 15 unit/ Total Parenteral Nutrition/Amino 

Acids/Dextrose/ Fat Emulsion Intravenous 1,680 ml @  70 mls/hr TPN  CONT IV  

Last administered on 7/12/20at 22:02;  Start 7/12/20 at 22:00;  Stop 7/13/20 at 

21:59;  Status DC


Diphenhydramine HCl (Benadryl) 25 mg 1X  ONCE IVP  Last administered on 

7/12/20at 14:26;  Start 7/12/20 at 14:30;  Stop 7/12/20 at 14:31;  Status DC


Vancomycin HCl 1.5 gm/Sodium Chloride 500 ml @  250 mls/hr Q8H IV  Last adminis

tered on 7/13/20at 03:08;  Start 7/12/20 at 18:30;  Stop 7/13/20 at 12:24;  

Status DC


Vancomycin HCl (Vancomycin Trough Level) 1 each 1X  ONCE MC  Last administered 

on 7/13/20at 10:00;  Start 7/13/20 at 10:00;  Stop 7/13/20 at 10:01;  Status DC


Sodium Chloride 90 meq/Potassium Chloride 30 meq/ Potassium Acetate 30 

meq/Magnesium Sulfate 15 meq/ Multivitamins 10 ml/Chromium/ Copper/Manganese/ 

Seleni/Zn 1 ml/ Insulin Human Regular 15 unit/ Total Parenteral Nutrition/Amino 

Acids/Dextrose/ Fat Emulsion Intravenous 1,680 ml @  70 mls/hr TPN  CONT IV  

Last administered on 7/13/20at 22:13;  Start 7/13/20 at 22:00;  Stop 7/14/20 at 

21:59;  Status DC


Vancomycin HCl (Vancomycin Random Level) 1 each 1X  ONCE MC  Last administered 

on 7/14/20at 01:00;  Start 7/14/20 at 01:00;  Stop 7/14/20 at 01:01;  Status DC


Vancomycin HCl 1.5 gm/Sodium Chloride 500 ml @  250 mls/hr Q12H IV  Last 

administered on 7/14/20at 22:07;  Start 7/14/20 at 10:00;  Stop 7/15/20 at 

07:41;  Status DC


Vancomycin HCl (Vancomycin Trough Level) 1 each 1X  ONCE MC ;  Start 7/15/20 at 

09:30;  Stop 7/15/20 at 09:31;  Status Cancel


Sodium Chloride 90 meq/Potassium Chloride 30 meq/ Potassium Acetate 30 

meq/Magnesium Sulfate 15 meq/ Multivitamins 10 ml/Chromium/ Copper/Manganese/ 

Seleni/Zn 1 ml/ Insulin Human Regular 15 unit/ Total Parenteral Nutrition/Amino 

Acids/Dextrose/ Fat Emulsion Intravenous 1,680 ml @  70 mls/hr TPN  CONT IV  La

st administered on 7/14/20at 22:08;  Start 7/14/20 at 22:00;  Stop 7/15/20 at 

21:59;  Status DC


Alteplase, Recombinant (Cathflo For Central Catheter Clearance) 1 mg 1X  ONCE 

INT CAT  Last administered on 7/14/20at 11:49;  Start 7/14/20 at 11:00;  Stop 

7/14/20 at 11:01;  Status DC


Daptomycin 500 mg/ Sodium Chloride 50 ml @  100 mls/hr Q24H IV  Last 

administered on 7/19/20at 09:27;  Start 7/15/20 at 09:00


Sodium Chloride 90 meq/Potassium Chloride 30 meq/ Potassium Acetate 30 

meq/Magnesium Sulfate 15 meq/ Multivitamins 10 ml/Chromium/ Copper/Manganese/ 

Seleni/Zn 1 ml/ Insulin Human Regular 15 unit/ Total Parenteral Nutrition/Amino 

Acids/Dextrose/ Fat Emulsion Intravenous 1,680 ml @  70 mls/hr TPN  CONT IV  

Last administered on 7/15/20at 22:55;  Start 7/15/20 at 22:00;  Stop 7/16/20 at 

21:59;  Status DC


Sodium Chloride 90 meq/Potassium Chloride 30 meq/ Potassium Acetate 30 

meq/Magnesium Sulfate 15 meq/ Multivitamins 10 ml/Chromium/ Copper/Manganese/ 

Seleni/Zn 1 ml/ Insulin Human Regular 15 unit/ Total Parenteral Nutrition/Amino 

Acids/Dextrose/ Fat Emulsion Intravenous 1,680 ml @  70 mls/hr TPN  CONT IV  

Last administered on 7/16/20at 22:06;  Start 7/16/20 at 22:00;  Stop 7/17/20 at 

21:59;  Status DC


Diphenhydramine HCl (Benadryl) 50 mg STK-MED ONCE .ROUTE ;  Start 7/16/20 at 

18:34;  Stop 7/16/20 at 18:35;  Status DC


Diphenhydramine HCl (Benadryl) 25 mg 1X  ONCE IM ;  Start 7/16/20 at 18:45;  

Stop 7/16/20 at 18:46;  Status DC


Diphenhydramine HCl (Benadryl) 25 mg 1X  ONCE IVP  Last administered on 

7/16/20at 18:56;  Start 7/16/20 at 19:00;  Stop 7/16/20 at 19:01;  Status DC


Alprazolam (Xanax) 0.5 mg PRN TID  PRN PO ANXIETY / AGITATION Last administered 

on 7/19/20at 02:58;  Start 7/17/20 at 08:00


Sodium Chloride 110 meq/Potassium Chloride 30 meq/ Potassium Acetate 30 me

q/Magnesium Sulfate 15 meq/ Multivitamins 10 ml/Chromium/ Copper/Manganese/ 

Seleni/Zn 1 ml/ Insulin Human Regular 15 unit/ Total Parenteral Nutrition/Amino 

Acids/Dextrose/ Fat Emulsion Intravenous 1,680 ml @  70 mls/hr TPN  CONT IV  

Last administered on 7/17/20at 21:21;  Start 7/17/20 at 22:00;  Stop 7/18/20 at 

21:59;  Status DC


Sodium Chloride 110 meq/Potassium Chloride 30 meq/ Potassium Acetate 30 

meq/Magnesium Sulfate 15 meq/ Multivitamins 10 ml/Chromium/ Copper/Manganese/ 

Seleni/Zn 1 ml/ Insulin Human Regular 15 unit/ Total Parenteral Nutrition/Amino 

Acids/Dextrose/ Fat Emulsion Intravenous 1,680 ml @  70 mls/hr TPN  CONT IV  La

st administered on 7/18/20at 22:01;  Start 7/18/20 at 22:00;  Stop 7/19/20 at 

21:59


Alteplase, Recombinant (Cathflo For Central Catheter Clearance) 1 mg 1X  ONCE 

INT CAT  Last administered on 7/19/20at 08:23;  Start 7/19/20 at 08:15;  Stop 

7/19/20 at 08:16;  Status DC


Sodium Chloride 110 meq/Sodium Phosphate 10 mmol/ Potassium Chloride 30 meq/ 

Potassium Acetate 30 meq/Magnesium Sulfate 15 meq/ Multivitamins 10 ml/Chromium/

Copper/Manganese/ Seleni/Zn 1 ml/ Insulin Human Regular 15 unit/ Total 

Parenteral Nutrition/Amino Acids/Dextrose/ Fat Emulsion Intravenous 1,680 ml @  

70 mls/hr TPN  CONT IV ;  Start 7/19/20 at 22:00;  Stop 7/20/20 at 21:59





Active Scripts


Active


Reported


Bisoprolol Fumarate 5 Mg Tablet 10 Mg PO DAILY


Vitals/I & O





Vital Sign - Last 24 Hours








 7/18/20 7/18/20 7/18/20 7/18/20





 11:00 12:00 12:00 12:14


 


Temp   99.0 





   99.0 


 


Pulse 120  120 


 


Resp 24  24 


 


B/P (MAP) 160/98 (118)  159/100 (119) 


 


Pulse Ox 97  99 98


 


O2 Delivery Tracheal Collar Trach Collar Tracheal Collar Tracheal Collar


 


O2 Flow Rate  8.0  8.0


 


    





    





 7/18/20 7/18/20 7/18/20 7/18/20





 13:00 13:59 14:00 14:29


 


Pulse 120  128 


 


Resp 24 25 24 


 


B/P (MAP) 165/98 (120)  157/94 (115) 


 


Pulse Ox 97 98 97 98


 


O2 Delivery Tracheal Collar Tracheal Collar Tracheal Collar 


 


O2 Flow Rate  8.0  8.0





 7/18/20 7/18/20 7/18/20 7/18/20





 15:00 16:00 16:00 16:03


 


Temp  98.4  





  98.4  


 


Pulse 128 126  


 


Resp 24 24  


 


B/P (MAP) 175/101 (125) 151/97 (115)  


 


Pulse Ox 97 99  100


 


O2 Delivery Tracheal Collar Tracheal Collar Trach Collar Tracheal Collar


 


O2 Flow Rate   8.0 8.0


 


    





    





 7/18/20 7/18/20 7/18/20 7/18/20





 17:00 17:50 18:00 19:00


 


Pulse 134 133 110 121


 


Resp 24  24 26


 


B/P (MAP)  161/98 152/95 (114) 130/55 (80)


 


Pulse Ox 97  97 97


 


O2 Delivery Tracheal Collar  Tracheal Collar Tracheal Collar





 7/18/20 7/18/20 7/18/20 7/18/20





 20:00 20:00 20:00 20:25


 


Temp  99.8  





  99.8  


 


Pulse   129 


 


Resp   31 


 


B/P (MAP)   168/93 (118) 


 


Pulse Ox   98 95


 


O2 Delivery Trach Collar  Tracheal Collar Tracheal Collar


 


O2 Flow Rate 8.0   8.0


 


    





    





 7/18/20 7/18/20 7/18/20 7/18/20





 21:00 21:08 21:38 22:00


 


Temp 100.9   





 100.9   


 


Pulse 126   123


 


Resp 28 37 31 28


 


B/P (MAP) 139/80 (99)   115/79 (91)


 


Pulse Ox 99 94 97 99


 


O2 Delivery Tracheal Collar Tracheal Collar Tracheal Collar Tracheal Collar


 


    





    





 7/18/20 7/18/20 7/19/20 7/19/20





 23:00 23:00 00:00 00:01


 


Temp  99.9  99.1





  99.9  99.1


 


Pulse 117   112


 


Resp 30   23


 


B/P (MAP) 123/72 (89)   118/69 (85)


 


Pulse Ox 100   100


 


O2 Delivery Tracheal Collar  Trach Collar Tracheal Collar


 


O2 Flow Rate   8.0 


 


    





    





 7/19/20 7/19/20 7/19/20 7/19/20





 00:27 01:00 02:00 03:00


 


Pulse  110 116 114


 


Resp  23 28 28


 


B/P (MAP)  116/74 (88) 136/86 (103) 134/85 (101)


 


Pulse Ox 99 100 96 98


 


O2 Delivery Tracheal Collar Tracheal Collar Tracheal Collar Tracheal Collar


 


O2 Flow Rate 8.0   





 7/19/20 7/19/20 7/19/20 7/19/20





 04:00 04:00 04:10 05:00


 


Temp  97.6  





  97.6  


 


Pulse  111  103


 


Resp  26  28


 


B/P (MAP)  136/85 (102)  142/96 (111)


 


Pulse Ox  100 99 99


 


O2 Delivery Trach Collar Tracheal Collar Tracheal Collar Tracheal Collar


 


O2 Flow Rate 8.0  8.0 


 


    





    





 7/19/20 7/19/20 7/19/20 7/19/20





 06:00 07:00 08:00 08:00


 


Temp    98.1





    98.1


 


Pulse 113 116  114


 


Resp 24 29  26


 


B/P (MAP) 119/86 (97) 140/94 (109)  128/84 (99)


 


Pulse Ox 100 100  100


 


O2 Delivery Tracheal Collar Tracheal Collar Trach Collar Tracheal Collar


 


O2 Flow Rate  10.0 10.0 10.0


 


    





    





 7/19/20 7/19/20 7/19/20 7/19/20





 08:20 09:00 09:00 10:00


 


Pulse   116 112


 


Resp  26 28 24


 


B/P (MAP)   143/85 (104) 120/80 (93)


 


Pulse Ox 100 100 100 100


 


O2 Delivery Tracheal Collar Tracheal Collar Tracheal Collar Tracheal Collar


 


O2 Flow Rate 8.0 10.0 10.0 10.0














Intake and Output   


 


 7/18/20 7/18/20 7/19/20





 14:59 22:59 06:59


 


Intake Total   2258.2 ml


 


Output Total 1570 ml 1055 ml 1470 ml


 


Balance -1570 ml -1055 ml 788.2 ml











Justicifation of Admission Dx:


Justifications for Admission:


Justification of Admission Dx:  Yes











CHEY TURNER MD              Jul 19, 2020 10:17

## 2020-07-19 NOTE — PDOC
PULMONARY PROGRESS NOTES


Subjective


on trach shield 35%, has cough, large trach secretion, is tired, appears 

comfortable, t max 100.9


Vitals





Vital Signs








  Date Time  Temp Pulse Resp B/P (MAP) Pulse Ox O2 Delivery O2 Flow Rate FiO2


 


7/19/20 06:00  113 24 119/86 (97) 100 Tracheal Collar  


 


7/19/20 04:10       8.0 


 


7/19/20 04:00 97.6       





 97.6       








ROS:  No Nausea, No Chest Pain, No Increase Cough


General:  Alert


HEENT:  Other (trach site ok)


Lungs:  Crackles, Other (l ct)


Cardiovascular:  S1, S2


Abdomen:  Soft, Non-tender, Other (multiple ROBERT drains )


Neuro Exam:  Alert


Extremities:  Other (+1 BLE edema)


Skin:  Warm


Labs





Laboratory Tests








Test


 7/17/20


12:14 7/17/20


17:57 7/18/20


05:45 7/18/20


17:47


 


Glucose (Fingerstick)


 138 mg/dL


(70-99) 130 mg/dL


(70-99) 


 96 mg/dL


(70-99)


 


White Blood Count


 


 


 12.2 x10^3/uL


(4.0-11.0) 





 


Red Blood Count


 


 


 2.77 x10^6/uL


(3.50-5.40) 





 


Hemoglobin


 


 


 7.8 g/dL


(12.0-15.5) 





 


Hematocrit


 


 


 23.9 %


(36.0-47.0) 





 


Mean Corpuscular Volume   86 fL ()  


 


Mean Corpuscular Hemoglobin   28 pg (25-35)  


 


Mean Corpuscular Hemoglobin


Concent 


 


 33 g/dL


(31-37) 





 


Red Cell Distribution Width


 


 


 15.2 %


(11.5-14.5) 





 


Platelet Count


 


 


 666 x10^3/uL


(140-400) 





 


Neutrophils (%) (Auto)   70 % (31-73)  


 


Lymphocytes (%) (Auto)   12 % (24-48)  


 


Monocytes (%) (Auto)   13 % (0-9)  


 


Eosinophils (%) (Auto)   4 % (0-3)  


 


Basophils (%) (Auto)   1 % (0-3)  


 


Neutrophils # (Auto)


 


 


 8.6 x10^3/uL


(1.8-7.7) 





 


Lymphocytes # (Auto)


 


 


 1.5 x10^3/uL


(1.0-4.8) 





 


Monocytes # (Auto)


 


 


 1.6 x10^3/uL


(0.0-1.1) 





 


Eosinophils # (Auto)


 


 


 0.5 x10^3/uL


(0.0-0.7) 





 


Basophils # (Auto)


 


 


 0.1 x10^3/uL


(0.0-0.2) 





 


Sodium Level


 


 


 134 mmol/L


(136-145) 





 


Potassium Level


 


 


 4.2 mmol/L


(3.5-5.1) 





 


Chloride Level


 


 


 101 mmol/L


() 





 


Carbon Dioxide Level


 


 


 30 mmol/L


(21-32) 





 


Anion Gap   3 (6-14)  


 


Blood Urea Nitrogen


 


 


 11 mg/dL


(7-20) 





 


Creatinine


 


 


 0.6 mg/dL


(0.6-1.0) 





 


Estimated GFR


(Cockcroft-Gault) 


 


 106.3 


 





 


Glucose Level


 


 


 138 mg/dL


(70-99) 





 


Calcium Level


 


 


 9.5 mg/dL


(8.5-10.1) 





 


Test


 7/19/20


00:10 7/19/20


05:53 7/19/20


06:00 





 


Glucose (Fingerstick)


 137 mg/dL


(70-99) 120 mg/dL


(70-99) 


 





 


Sodium Level


 


 


 136 mmol/L


(136-145) 





 


Potassium Level


 


 


 4.2 mmol/L


(3.5-5.1) 





 


Chloride Level


 


 


 102 mmol/L


() 





 


Carbon Dioxide Level


 


 


 30 mmol/L


(21-32) 





 


Anion Gap   4 (6-14)  


 


Blood Urea Nitrogen


 


 


 10 mg/dL


(7-20) 





 


Creatinine


 


 


 0.5 mg/dL


(0.6-1.0) 





 


Estimated GFR


(Cockcroft-Gault) 


 


 131.1 


 





 


Glucose Level


 


 


 129 mg/dL


(70-99) 





 


Calcium Level


 


 


 9.6 mg/dL


(8.5-10.1) 





 


Phosphorus Level


 


 


 2.6 mg/dL


(2.6-4.7) 





 


Magnesium Level


 


 


 2.1 mg/dL


(1.8-2.4) 











Laboratory Tests








Test


 7/18/20


17:47 7/19/20


00:10 7/19/20


05:53 7/19/20


06:00


 


Glucose (Fingerstick)


 96 mg/dL


(70-99) 137 mg/dL


(70-99) 120 mg/dL


(70-99) 





 


Sodium Level


 


 


 


 136 mmol/L


(136-145)


 


Potassium Level


 


 


 


 4.2 mmol/L


(3.5-5.1)


 


Chloride Level


 


 


 


 102 mmol/L


()


 


Carbon Dioxide Level


 


 


 


 30 mmol/L


(21-32)


 


Anion Gap    4 (6-14) 


 


Blood Urea Nitrogen


 


 


 


 10 mg/dL


(7-20)


 


Creatinine


 


 


 


 0.5 mg/dL


(0.6-1.0)


 


Estimated GFR


(Cockcroft-Gault) 


 


 


 131.1 





 


Glucose Level


 


 


 


 129 mg/dL


(70-99)


 


Calcium Level


 


 


 


 9.6 mg/dL


(8.5-10.1)


 


Phosphorus Level


 


 


 


 2.6 mg/dL


(2.6-4.7)


 


Magnesium Level


 


 


 


 2.1 mg/dL


(1.8-2.4)








Medications





Active Scripts








 Medications  Dose


 Route/Sig


 Max Daily Dose Days Date Category


 


 Bisoprolol


 Fumarate 5 Mg


 Tablet  10 Mg


 PO DAILY


   3/16/20 Reported








Comments


cxr 7/13, reviewed,   b ll infilt effusion atelectasis





ct reviewed 7/12/20, Decreased left-sided effusion after catheter placement. The




right-sided effusion has increased as has atelectasis.


 





 


There has been exchange or placement of multiple drainage 


tubes and a gastrojejunostomy tube. Both collections are smaller. No 


significant new abdominal fluid collection is seen. The jejunal component 


of the gastrojejunostomy tube appears to be looped in the proximal small 


bowel. 











ct abdomen /pelvis 6/6


1. Removal of the percutaneous pigtail drainage catheters since the prior 


exam. Sequela of pancreatitis with extensive pseudocysts again 


demonstrated, the right-sided collections are slightly larger since the 


prior exam, the left-sided collections are stable. See above.


2. Moderate to large left pleural effusion with atelectasis and collapse 


of most of the left lower lobe, stable. Small right pleural effusion is 


stable.


3. Gallstone.





ct chest 6/15 reviewed








 GRAM NEG COCCOBACILLI:MANY


        SQUAMOUS EPI CELL:RARE


        PMN (WBCs):FEW


        Unless otherwise specified, Testing Performed by:


        [facility]


        [address] [place] [address]


        For Inquires, the Physician may contact the Microbiology


        department at [phone]





  RESPIRATORY CULTURE  Final  


        Final





        MANY GRAM NEGATIVE RODS on 06/15/20 at 1106


        FINAL ID= [PSEUDOMONAS [id number]]


        MICRO CHARGES


        PSEUDOMONAS AERUGINOSA





  ANTIMICROBIAL SUSCEPTIBILITY  Final  


        Comment





        NEG ANSON 56


        PSEUDOMONAS AERUGINOSA


        ANTIBIOTIC                        RESULT          INTERPRETATION


        AMIKACIN                          <=16                  S


        AZTREONAM                         <=4                   S


        CEFTAZIDIME                       <=1                   S


        CIPROFLOXACIN                     <=0.25                S


        CEFEPIME                          <=2                   S


        CEFTAZIDIME/AVIBACTAM             <=4                   S


        GENTAMICIN                        <=2                   S


        LEVOFLOXACIN                      <=0.5                 S





Impression


.





IMPRESSION:


1.  Acute hypoxemic respiratory failure secondary to ARDS status post trach, 

developed anemia 6/7, blood drainage from RLQ abdomen drain site, and s

urrounding firmness  / developed septic shock 6/7 from abdomen source, required 

levo 6/7


s/p 3 new drains 6/7 with brown color drainage,  


2.  Gallstone pancreatitis, now with ongoing bleeding from prior drain. Anemic. 

s/p Tx multiple units over several days


3.  septic shock/sepsis, recurrent 6/7, source abdomen. new fever  ? aspiration 

pneumonitis/pneumonia


4.  Acute kidney injury-, Off HD--renal function decling. suspect JED on CKD due

to hypotension , improved now


5.  Acute gallstone pancreatitis.


6.  Hypoalbuminemia.


7.  Moderate persistent effusions, s/p left thora  5/12, reaccumulation of left 

effusion. O2 requirement not changed. 


8.  Fever- ,hypotension. suspect recurrent sepsis/ likely pancreatic source.  

Per ID, per surgery--


9.  Chronic anemia-- ongoing / s/p PRBC


10. Covid 19 testing negative


11. Moderate to large ascites-S/P paracentisis


12.S/P paracentisis with 4 liters removed on 4/15/20


13. S/P IR drain placement on 5/8/2020, removal, re inserted 6/7


14. Depression/Anxiety 


15.,  Fever, per ID


16.  Status post chest tube placement, not much drainage


6/30


S/P Exploratory laparotomy, lysis of adhesions, subtotal cholecystectomy with 

cholangiogram, gastrojejunostomy tube placement, pancreatic necrosectomy


leukocytosis- improving





Plan


.


sputum cx, 


flush Chest tube  increase suction to -40  cxr in am


Continue current support, monitor H&H, 


Trach shield


Up to chair, pt/ot


Follow culture


Follow surgery input


DVT GI prophylaxis


ABX per ID 


f/u BC /resp cultures 


Continue TPN for nutrition support 


DVT/GI PPX


D/W RN and RT,











MAN BLAKE MD               Jul 19, 2020 07:01

## 2020-07-20 VITALS — DIASTOLIC BLOOD PRESSURE: 74 MMHG | SYSTOLIC BLOOD PRESSURE: 111 MMHG

## 2020-07-20 VITALS — SYSTOLIC BLOOD PRESSURE: 129 MMHG | DIASTOLIC BLOOD PRESSURE: 77 MMHG

## 2020-07-20 VITALS — DIASTOLIC BLOOD PRESSURE: 85 MMHG | SYSTOLIC BLOOD PRESSURE: 123 MMHG

## 2020-07-20 VITALS — SYSTOLIC BLOOD PRESSURE: 131 MMHG | DIASTOLIC BLOOD PRESSURE: 79 MMHG

## 2020-07-20 VITALS — SYSTOLIC BLOOD PRESSURE: 131 MMHG | DIASTOLIC BLOOD PRESSURE: 77 MMHG

## 2020-07-20 VITALS — DIASTOLIC BLOOD PRESSURE: 77 MMHG | SYSTOLIC BLOOD PRESSURE: 131 MMHG

## 2020-07-20 VITALS — DIASTOLIC BLOOD PRESSURE: 85 MMHG | SYSTOLIC BLOOD PRESSURE: 159 MMHG

## 2020-07-20 VITALS — DIASTOLIC BLOOD PRESSURE: 74 MMHG | SYSTOLIC BLOOD PRESSURE: 135 MMHG

## 2020-07-20 VITALS — SYSTOLIC BLOOD PRESSURE: 117 MMHG | DIASTOLIC BLOOD PRESSURE: 77 MMHG

## 2020-07-20 VITALS — DIASTOLIC BLOOD PRESSURE: 76 MMHG | SYSTOLIC BLOOD PRESSURE: 135 MMHG

## 2020-07-20 VITALS — DIASTOLIC BLOOD PRESSURE: 74 MMHG | SYSTOLIC BLOOD PRESSURE: 114 MMHG

## 2020-07-20 VITALS — DIASTOLIC BLOOD PRESSURE: 65 MMHG | SYSTOLIC BLOOD PRESSURE: 126 MMHG

## 2020-07-20 VITALS — DIASTOLIC BLOOD PRESSURE: 70 MMHG | SYSTOLIC BLOOD PRESSURE: 122 MMHG

## 2020-07-20 VITALS — DIASTOLIC BLOOD PRESSURE: 93 MMHG | SYSTOLIC BLOOD PRESSURE: 131 MMHG

## 2020-07-20 VITALS — SYSTOLIC BLOOD PRESSURE: 118 MMHG | DIASTOLIC BLOOD PRESSURE: 76 MMHG

## 2020-07-20 VITALS — SYSTOLIC BLOOD PRESSURE: 109 MMHG | DIASTOLIC BLOOD PRESSURE: 73 MMHG

## 2020-07-20 VITALS — SYSTOLIC BLOOD PRESSURE: 120 MMHG | DIASTOLIC BLOOD PRESSURE: 76 MMHG

## 2020-07-20 VITALS — SYSTOLIC BLOOD PRESSURE: 124 MMHG | DIASTOLIC BLOOD PRESSURE: 68 MMHG

## 2020-07-20 VITALS — SYSTOLIC BLOOD PRESSURE: 113 MMHG | DIASTOLIC BLOOD PRESSURE: 77 MMHG

## 2020-07-20 LAB
% BANDS: 9 % (ref 0–9)
% LYMPHS: 6 % (ref 24–48)
% MONOS: 7 % (ref 0–10)
% SEGS: 76 % (ref 35–66)
ANION GAP SERPL CALC-SCNC: 2 MMOL/L (ref 6–14)
ANISOCYTOSIS BLD QL SMEAR: SLIGHT
BASOPHILS # BLD AUTO: 0.1 X10^3/UL (ref 0–0.2)
BASOPHILS NFR BLD AUTO: 1 % (ref 0–3)
BASOPHILS NFR BLD: 0 % (ref 0–3)
BUN SERPL-MCNC: 13 MG/DL (ref 7–20)
CALCIUM SERPL-MCNC: 9.4 MG/DL (ref 8.5–10.1)
CHLORIDE SERPL-SCNC: 100 MMOL/L (ref 98–107)
CO2 SERPL-SCNC: 31 MMOL/L (ref 21–32)
CREAT SERPL-MCNC: 0.6 MG/DL (ref 0.6–1)
EOSINOPHIL NFR BLD AUTO: 1 % (ref 0–5)
EOSINOPHIL NFR BLD: 0.4 X10^3/UL (ref 0–0.7)
EOSINOPHIL NFR BLD: 2 % (ref 0–3)
ERYTHROCYTE [DISTWIDTH] IN BLOOD BY AUTOMATED COUNT: 15.6 % (ref 11.5–14.5)
GFR SERPLBLD BASED ON 1.73 SQ M-ARVRAT: 106.3 ML/MIN
GLUCOSE SERPL-MCNC: 113 MG/DL (ref 70–99)
HCT VFR BLD CALC: 24.8 % (ref 36–47)
HGB BLD-MCNC: 8.1 G/DL (ref 12–15.5)
LYMPHOCYTES # BLD: 1.9 X10^3/UL (ref 1–4.8)
LYMPHOCYTES NFR BLD AUTO: 8 % (ref 24–48)
MCH RBC QN AUTO: 28 PG (ref 25–35)
MCHC RBC AUTO-ENTMCNC: 33 G/DL (ref 31–37)
MCV RBC AUTO: 86 FL (ref 79–100)
MONO #: 1.5 X10^3/UL (ref 0–1.1)
MONOCYTES NFR BLD: 6 % (ref 0–9)
NEUT #: 21.4 X10^3/UL (ref 1.8–7.7)
NEUTROPHILS NFR BLD AUTO: 85 % (ref 31–73)
NRBC # BLD MANUAL: 1 10*3/UL
PLATELET # BLD AUTO: 688 X10^3/UL (ref 140–400)
PLATELET # BLD EST: (no result) 10*3/UL
POTASSIUM SERPL-SCNC: 4.6 MMOL/L (ref 3.5–5.1)
RBC # BLD AUTO: 2.87 X10^6/UL (ref 3.5–5.4)
SODIUM SERPL-SCNC: 133 MMOL/L (ref 136–145)
WBC # BLD AUTO: 25.3 X10^3/UL (ref 4–11)

## 2020-07-20 RX ADMIN — INSULIN LISPRO SCH UNITS: 100 INJECTION, SOLUTION INTRAVENOUS; SUBCUTANEOUS at 00:00

## 2020-07-20 RX ADMIN — MEROPENEM SCH MLS/HR: 500 INJECTION, POWDER, FOR SOLUTION INTRAVENOUS at 00:21

## 2020-07-20 RX ADMIN — MEROPENEM SCH MLS/HR: 500 INJECTION, POWDER, FOR SOLUTION INTRAVENOUS at 18:01

## 2020-07-20 RX ADMIN — PANTOPRAZOLE SODIUM SCH MG: 40 INJECTION, POWDER, FOR SOLUTION INTRAVENOUS at 09:12

## 2020-07-20 RX ADMIN — BACITRACIN SCH MLS/HR: 5000 INJECTION, POWDER, FOR SOLUTION INTRAMUSCULAR at 21:03

## 2020-07-20 RX ADMIN — IPRATROPIUM BROMIDE AND ALBUTEROL SULFATE SCH ML: .5; 3 SOLUTION RESPIRATORY (INHALATION) at 12:14

## 2020-07-20 RX ADMIN — INSULIN LISPRO SCH UNITS: 100 INJECTION, SOLUTION INTRAVENOUS; SUBCUTANEOUS at 18:00

## 2020-07-20 RX ADMIN — IPRATROPIUM BROMIDE AND ALBUTEROL SULFATE SCH ML: .5; 3 SOLUTION RESPIRATORY (INHALATION) at 05:14

## 2020-07-20 RX ADMIN — ONDANSETRON PRN MG: 2 INJECTION INTRAMUSCULAR; INTRAVENOUS at 08:41

## 2020-07-20 RX ADMIN — ACETYLCYSTEINE SCH MG: 200 INHALANT RESPIRATORY (INHALATION) at 20:01

## 2020-07-20 RX ADMIN — DAPTOMYCIN SCH MLS/HR: 500 INJECTION, POWDER, LYOPHILIZED, FOR SOLUTION INTRAVENOUS at 09:14

## 2020-07-20 RX ADMIN — IPRATROPIUM BROMIDE AND ALBUTEROL SULFATE SCH ML: .5; 3 SOLUTION RESPIRATORY (INHALATION) at 08:49

## 2020-07-20 RX ADMIN — INSULIN LISPRO SCH UNITS: 100 INJECTION, SOLUTION INTRAVENOUS; SUBCUTANEOUS at 14:42

## 2020-07-20 RX ADMIN — ENOXAPARIN SODIUM SCH MG: 40 INJECTION SUBCUTANEOUS at 08:41

## 2020-07-20 RX ADMIN — CIPROFLOXACIN SCH MLS/HR: 2 INJECTION, SOLUTION INTRAVENOUS at 21:02

## 2020-07-20 RX ADMIN — IPRATROPIUM BROMIDE AND ALBUTEROL SULFATE SCH ML: .5; 3 SOLUTION RESPIRATORY (INHALATION) at 00:20

## 2020-07-20 RX ADMIN — Medication PRN EACH: at 12:03

## 2020-07-20 RX ADMIN — IPRATROPIUM BROMIDE AND ALBUTEROL SULFATE SCH ML: .5; 3 SOLUTION RESPIRATORY (INHALATION) at 20:01

## 2020-07-20 RX ADMIN — CIPROFLOXACIN SCH MLS/HR: 2 INJECTION, SOLUTION INTRAVENOUS at 08:42

## 2020-07-20 RX ADMIN — ONDANSETRON PRN MG: 2 INJECTION INTRAMUSCULAR; INTRAVENOUS at 15:11

## 2020-07-20 RX ADMIN — ACETAMINOPHEN PRN MG: 650 SUPPOSITORY RECTAL at 22:11

## 2020-07-20 RX ADMIN — MEROPENEM SCH MLS/HR: 500 INJECTION, POWDER, FOR SOLUTION INTRAVENOUS at 05:56

## 2020-07-20 RX ADMIN — IPRATROPIUM BROMIDE AND ALBUTEROL SULFATE SCH ML: .5; 3 SOLUTION RESPIRATORY (INHALATION) at 15:55

## 2020-07-20 RX ADMIN — IPRATROPIUM BROMIDE AND ALBUTEROL SULFATE SCH ML: .5; 3 SOLUTION RESPIRATORY (INHALATION) at 22:42

## 2020-07-20 RX ADMIN — DEXTROSE SCH MLS/HR: 50 INJECTION, SOLUTION INTRAVENOUS at 08:43

## 2020-07-20 RX ADMIN — MEROPENEM SCH MLS/HR: 500 INJECTION, POWDER, FOR SOLUTION INTRAVENOUS at 12:48

## 2020-07-20 RX ADMIN — INSULIN LISPRO SCH UNITS: 100 INJECTION, SOLUTION INTRAVENOUS; SUBCUTANEOUS at 05:56

## 2020-07-20 RX ADMIN — ACETYLCYSTEINE SCH MG: 200 INHALANT RESPIRATORY (INHALATION) at 08:49

## 2020-07-20 NOTE — PDOC
SURGICAL PROGRESS NOTE


Subjective


working with pt


d/w nurse


Vital Signs





Vital Signs








  Date Time  Temp Pulse Resp B/P (MAP) Pulse Ox O2 Delivery O2 Flow Rate FiO2


 


7/20/20 08:49     99 Tracheal Collar 8.0 


 


7/20/20 06:00  128 28 131/77 (95)    


 


7/20/20 04:00 99.8       





 99.8       








I&O











Intake and Output 


 


 7/20/20





 07:00


 


Intake Total 2409.4 ml


 


Output Total 2520 ml


 


Balance -110.6 ml


 


 


 


IV Total 2409.4 ml


 


Output Urine Total 1730 ml


 


Gastric Drainage Total 350 ml


 


Chest Tube Drainage Total 160 ml


 


Drainage Total 280 ml








General:  Cooperative, No acute distress


Abdomen:  Other (drains bilious, mulitple drains )


Labs





Laboratory Tests








Test


 7/18/20


17:47 7/19/20


00:10 7/19/20


05:53 7/19/20


06:00


 


Glucose (Fingerstick)


 96 mg/dL


(70-99) 137 mg/dL


(70-99) 120 mg/dL


(70-99) 





 


Sodium Level


 


 


 


 136 mmol/L


(136-145)


 


Potassium Level


 


 


 


 4.2 mmol/L


(3.5-5.1)


 


Chloride Level


 


 


 


 102 mmol/L


()


 


Carbon Dioxide Level


 


 


 


 30 mmol/L


(21-32)


 


Anion Gap    4 (6-14) 


 


Blood Urea Nitrogen


 


 


 


 10 mg/dL


(7-20)


 


Creatinine


 


 


 


 0.5 mg/dL


(0.6-1.0)


 


Estimated GFR


(Cockcroft-Gault) 


 


 


 131.1 





 


Glucose Level


 


 


 


 129 mg/dL


(70-99)


 


Calcium Level


 


 


 


 9.6 mg/dL


(8.5-10.1)


 


Phosphorus Level


 


 


 


 2.6 mg/dL


(2.6-4.7)


 


Magnesium Level


 


 


 


 2.1 mg/dL


(1.8-2.4)


 


Test


 7/19/20


08:50 7/19/20


12:31 7/19/20


18:22 7/20/20


00:20


 


Creatine Kinase


 11 U/L


() 


 


 





 


Glucose (Fingerstick)


 


 162 mg/dL


(70-99) 102 mg/dL


(70-99) 115 mg/dL


(70-99)


 


Test


 7/20/20


05:55 


 


 





 


White Blood Count


 25.3 x10^3/uL


(4.0-11.0) 


 


 





 


Red Blood Count


 2.87 x10^6/uL


(3.50-5.40) 


 


 





 


Hemoglobin


 8.1 g/dL


(12.0-15.5) 


 


 





 


Hematocrit


 24.8 %


(36.0-47.0) 


 


 





 


Mean Corpuscular Volume 86 fL ()    


 


Mean Corpuscular Hemoglobin 28 pg (25-35)    


 


Mean Corpuscular Hemoglobin


Concent 33 g/dL


(31-37) 


 


 





 


Red Cell Distribution Width


 15.6 %


(11.5-14.5) 


 


 





 


Platelet Count


 688 x10^3/uL


(140-400) 


 


 





 


Neutrophils (%) (Auto) 85 % (31-73)    


 


Lymphocytes (%) (Auto) 8 % (24-48)    


 


Monocytes (%) (Auto) 6 % (0-9)    


 


Eosinophils (%) (Auto) 2 % (0-3)    


 


Basophils (%) (Auto) 0 % (0-3)    


 


Neutrophils # (Auto)


 21.4 x10^3/uL


(1.8-7.7) 


 


 





 


Lymphocytes # (Auto)


 1.9 x10^3/uL


(1.0-4.8) 


 


 





 


Monocytes # (Auto)


 1.5 x10^3/uL


(0.0-1.1) 


 


 





 


Eosinophils # (Auto)


 0.4 x10^3/uL


(0.0-0.7) 


 


 





 


Basophils # (Auto)


 0.1 x10^3/uL


(0.0-0.2) 


 


 





 


Sodium Level


 133 mmol/L


(136-145) 


 


 





 


Potassium Level


 4.6 mmol/L


(3.5-5.1) 


 


 





 


Chloride Level


 100 mmol/L


() 


 


 





 


Carbon Dioxide Level


 31 mmol/L


(21-32) 


 


 





 


Anion Gap 2 (6-14)    


 


Blood Urea Nitrogen


 13 mg/dL


(7-20) 


 


 





 


Creatinine


 0.6 mg/dL


(0.6-1.0) 


 


 





 


Estimated GFR


(Cockcroft-Gault) 106.3 


 


 


 





 


Glucose Level


 113 mg/dL


(70-99) 


 


 





 


Glucose (Fingerstick)


 114 mg/dL


(70-99) 


 


 





 


Calcium Level


 9.4 mg/dL


(8.5-10.1) 


 


 











Laboratory Tests








Test


 7/19/20


12:31 7/19/20


18:22 7/20/20


00:20 7/20/20


05:55


 


Glucose (Fingerstick)


 162 mg/dL


(70-99) 102 mg/dL


(70-99) 115 mg/dL


(70-99) 114 mg/dL


(70-99)


 


White Blood Count


 


 


 


 25.3 x10^3/uL


(4.0-11.0)


 


Red Blood Count


 


 


 


 2.87 x10^6/uL


(3.50-5.40)


 


Hemoglobin


 


 


 


 8.1 g/dL


(12.0-15.5)


 


Hematocrit


 


 


 


 24.8 %


(36.0-47.0)


 


Mean Corpuscular Volume    86 fL () 


 


Mean Corpuscular Hemoglobin    28 pg (25-35) 


 


Mean Corpuscular Hemoglobin


Concent 


 


 


 33 g/dL


(31-37)


 


Red Cell Distribution Width


 


 


 


 15.6 %


(11.5-14.5)


 


Platelet Count


 


 


 


 688 x10^3/uL


(140-400)


 


Neutrophils (%) (Auto)    85 % (31-73) 


 


Lymphocytes (%) (Auto)    8 % (24-48) 


 


Monocytes (%) (Auto)    6 % (0-9) 


 


Eosinophils (%) (Auto)    2 % (0-3) 


 


Basophils (%) (Auto)    0 % (0-3) 


 


Neutrophils # (Auto)


 


 


 


 21.4 x10^3/uL


(1.8-7.7)


 


Lymphocytes # (Auto)


 


 


 


 1.9 x10^3/uL


(1.0-4.8)


 


Monocytes # (Auto)


 


 


 


 1.5 x10^3/uL


(0.0-1.1)


 


Eosinophils # (Auto)


 


 


 


 0.4 x10^3/uL


(0.0-0.7)


 


Basophils # (Auto)


 


 


 


 0.1 x10^3/uL


(0.0-0.2)


 


Sodium Level


 


 


 


 133 mmol/L


(136-145)


 


Potassium Level


 


 


 


 4.6 mmol/L


(3.5-5.1)


 


Chloride Level


 


 


 


 100 mmol/L


()


 


Carbon Dioxide Level


 


 


 


 31 mmol/L


(21-32)


 


Anion Gap    2 (6-14) 


 


Blood Urea Nitrogen


 


 


 


 13 mg/dL


(7-20)


 


Creatinine


 


 


 


 0.6 mg/dL


(0.6-1.0)


 


Estimated GFR


(Cockcroft-Gault) 


 


 


 106.3 





 


Glucose Level


 


 


 


 113 mg/dL


(70-99)


 


Calcium Level


 


 


 


 9.4 mg/dL


(8.5-10.1)








Problem List


Problems


Medical Problems:


(1) Acute pancreatitis


Status: Acute  





(2) Cholelithiasis


Status: Acute  








Assessment/Plan


continue care





Justicifation of Admission Dx:


Justifications for Admission:


Justification of Admission Dx:  Yes











SAURAV NASH APRN            Jul 20, 2020 09:55

## 2020-07-20 NOTE — PDOC
Objective:


Vital Signs:





                                   Vital Signs








  Date Time  Temp Pulse Resp B/P (MAP) Pulse Ox O2 Delivery O2 Flow Rate FiO2


 


7/20/20 12:14     96 Nasal Cannula 2.0 


 


7/20/20 06:00  128 28 131/77 (95)    


 


7/20/20 04:00 99.8       





 99.8       








Labs:





Laboratory Tests








Test


 7/19/20


18:22 7/20/20


00:20 7/20/20


05:55


 


Glucose (Fingerstick) 102 mg/dL  115 mg/dL  114 mg/dL 


 


White Blood Count   25.3 x10^3/uL 


 


Red Blood Count   2.87 x10^6/uL 


 


Hemoglobin   8.1 g/dL 


 


Hematocrit   24.8 % 


 


Mean Corpuscular Volume   86 fL 


 


Mean Corpuscular Hemoglobin   28 pg 


 


Mean Corpuscular Hemoglobin


Concent 


 


 33 g/dL 





 


Red Cell Distribution Width   15.6 % 


 


Platelet Count   688 x10^3/uL 


 


Neutrophils (%) (Auto)   85 % 


 


Lymphocytes (%) (Auto)   8 % 


 


Monocytes (%) (Auto)   6 % 


 


Eosinophils (%) (Auto)   2 % 


 


Basophils (%) (Auto)   0 % 


 


Neutrophils # (Auto)   21.4 x10^3/uL 


 


Lymphocytes # (Auto)   1.9 x10^3/uL 


 


Monocytes # (Auto)   1.5 x10^3/uL 


 


Eosinophils # (Auto)   0.4 x10^3/uL 


 


Basophils # (Auto)   0.1 x10^3/uL 


 


Segmented Neutrophils %   76 % 


 


Band Neutrophils %   9 % 


 


Lymphocytes %   6 % 


 


Monocytes %   7 % 


 


Eosinophils %   1 % 


 


Basophils %   1 % 


 


Nucleated Red Blood Cells   1 


 


Platelet Estimate   Increased 


 


Large Platelets   Occ 


 


Anisocytosis   Slight 


 


Sodium Level   133 mmol/L 


 


Potassium Level   4.6 mmol/L 


 


Chloride Level   100 mmol/L 


 


Carbon Dioxide Level   31 mmol/L 


 


Anion Gap   2 


 


Blood Urea Nitrogen   13 mg/dL 


 


Creatinine   0.6 mg/dL 


 


Estimated GFR


(Cockcroft-Gault) 


 


 106.3 





 


Glucose Level   113 mg/dL 


 


Calcium Level   9.4 mg/dL 








  GRAM STAIN EVALUATION  


                                PENDING





  RESPIRATORY CULTURE  Preliminary  


        Preliminary





        MANY GRAM NEGATIVE RODS on 07/20/20 at 1202


        PRELIMINARY ID= [PSEUDOMONAS SPECIES]








  BLOOD CULTURE  Final  


        NO GROWTH AFTER 5 DAYS


Imaging:


CXR 7/20


IMPRESSION:


*  Hypoexpanded examination the lungs with focal opacities at the lung bases 

which could be a combination of pleural effusion and adjacent airspace 

consolidation from atelectasis or infiltrate. There is also interstitial 

prominence which can be seen with edema or interstitial infiltrate.





PE:





GEN: chronically ill - up to chair - nurse tending to drains, RT present


LUNGS: trach collar


HEART: tachycardic


ABD: drains


NEURO/PSYCH: eyes closed - observed from outside room





A/P:


Complicated gallstone pancreatitis, MOSF, s/p necrosectomy





--


Continue support.





Justicifation of Admission Dx:


Justifications for Admission:


Justification of Admission Dx:  Yes











RAGHAVENDRA MURCIA         Jul 20, 2020 12:54

## 2020-07-20 NOTE — RAD
INDICATION: Reason: resp fail / Spl. Instructions:  / History: 

 

COMPARISON: July 13, 2020

 

FINDINGS:

 

Single view of chest obtained.

Tracheostomy tube. Left-sided PICC line is again seen. Pigtail drain 

projecting over the left chest base. Hypoexpanded exam the lungs with 

focal opacities at lung bases and interstitial prominence.

 

 

IMPRESSION:

 

*  Hypoexpanded examination the lungs with focal opacities at the lung 

bases which could be a combination of pleural effusion and adjacent 

airspace consolidation from atelectasis or infiltrate. There is also 

interstitial prominence which can be seen with edema or interstitial 

infiltrate.

 

Electronically signed by: Keegan Stovall MD (7/20/2020 9:13 AM) [id number]

## 2020-07-20 NOTE — NUR
Pharmacy TPN Dosing Note



S: JESENIA BEAN is a 49 year old F Currently receiving Central Continuous TPN started 
03/18/20



B:Pertinent PMH: 

Necrotizing pancreatitis

Height: 5 feet, 8 inches

Weight: 89.1 kg



Current diet: NPO 



LABS:

Sodium:    136 

Potassium: 4.2 

Chloride:  102 

Calcium:   9.6 

Corrected Calcium: 11.92 

Magnesium: 2.1 

CO2:       30 

SCr:       0.5 

Glucose:   120-129 

Albumin:   1.1 

AST:       25 

ALT:       37 



TPN FORMULA:

TPN TYPE:  Central Continuous

AMINO ACIDS:         85 gm

DEXTROSE:            250 gm

LIPIDS:              20 gm

SODIUM CHLORIDE:     120 mEq

SODIUM ACETATE:      - mEq

SODIUM PHOSPHATE:    10 mmol

POTASSIUM CHLORIDE:  30 mEq

POTASSIUM ACETATE:   30 mEq

POTASSIUM PHOSPHATE: - mmol

MAGNESIUM:           15 mEq

CALCIUM:             - mEq

INSULIN:             15 units

MULTIPLE VITAMIN:    10 ml

TRACE ELEMENTS:      1 ml ml(s)



TPN PLAN:  

INCREASE NACL  MEQ





R: Continue TPN at  same rate

Will monitor electrolytes, glucose, and tolerance to TPN.



 YENIFER STREETER [facility], 07/20/20 1907

## 2020-07-20 NOTE — PDOC
[facility] PROGRESS NOTE


Chief Complaint


Chief Complaint


Postop day 20 (Exploratory laparotomy, lysis of adhesions, subtotal 

cholecystectomy with cholangiogram, gastrojejunostomy tube placement, pancreatic

necrosectomy)


Acute hypoxic Respiratory failure required  mechanical ventilation


Tracheostomy


bilateral pleural effusions/pulm edema s/p Throacentesis on 6/15/2020


Severe Acute gallstone pancreatitis (not a surgical candidate at this time) with

necrosis


Acute kidney failure now requiring dialysis


Gallstones (Calculus of gallbladder with acute cholecystitis without 

obstruction)


HTN 


Intractable pain


Intractable nausea


Covid 19 negative. 


Acute on chronic anemia 


EEG: No seizure activity


Fever  - intermittent 


? Ileus with vomiting


Abd distention - U/S and CT reviewed s/p 0.4 L of opaque, debris-containing 

ascites was removed 5/6


Acute pancreatitis with persistent necrosis


Gallstone pancreatitis with necrosis. 


   -CT A/P 6/6 showed multiple pseudocysts, slight larger on the right. s/p 

drains x 3, 6/7.  + PSAE (MDRO-R Cefepime, Zosyn ANSON < 64) and yeast, 


   -s/p drain 4/27. C. parapsilosis. s/p drain 5/6 + yeast & high amylase; s/p 

additional drain on 5/8. Drains removed. 


Ascites s/p paracentesis 4/15 & 5/6. C. parapsilosis 


JED. off HD. 


A large fluid collection in the pancreatic bed has slightly decreased in size, d

escribed below, the pancreas itself is difficult to  visualize, which could be 

due to necrosis or obscuration of pancreatic  parenchyma from the surrounding 

fluid collection.6/15 


- 4/27 status post ROBERT drain placement + C paropsilosis. s/p additional drains 

5/8


Anemia - S/p PRBCs. 


Cholelithiasis with thickening of the gallbladder wall.


Leucocytosis improving


JED, hyperkalemia, Metabolic acidosis off dialysis


hypocalcemia 


Prediabetes


HTN


s/p trach


Hyperglycemia


severe protein-caloric malnutrition


Moderate to large left pleural effusion with atelectasis and collapse  of most 

of the left lower lobe, stable





History of Present Illness


History of Present Illness


7/20/2020


Patient seen and examined in the ICU


She is still extremely critically ill


On TPN


For IV antibiotics


Still tachycardic


Appears extremely weak frail and pale


Seems to be losing some weight despite the TPN


Discussed with case management


Discussed with RN


Chart reviewed





Vitals/I&O


Vitals/I&O:





                                   Vital Signs








  Date Time  Temp Pulse Resp B/P (MAP) Pulse Ox O2 Delivery O2 Flow Rate FiO2


 


7/20/20 12:14     96 Nasal Cannula 2.0 


 


7/20/20 06:00  128 28 131/77 (95)    


 


7/20/20 04:00 99.8       





 99.8       














                                    I & O   


 


 7/19/20 7/19/20 7/20/20





 15:00 23:00 07:00


 


Intake Total 1261.1 ml 233.3 ml 915 ml


 


Output Total 535 ml 1225 ml 760 ml


 


Balance 726.1 ml -991.7 ml 155 ml











Physical Exam


Physical Exam:


GENERAL: Propped up in bed, resting quietly 


HEENT: Pupils equal, oral cavity dry. NGT out, 


NECK:  Tracheostomy 


LUNGS: Diminished aeration bases,  CT on left 


HEART:  S1, S2, regular, tachy 110s 


ABDOMEN: Distended, bowel sounds hypoactive, soft, richardson x 2, 3 ROBERT drains, G-J 

tube and + wound vac 


: [place] in place 


EXTREMITIES: Trace generalized edema, no cyanosis. MARTHA hose bilaterally, 


SKIN: warm touch. No signs of rash.  


NEURO: Did not awaken 


LUE-PICC without signs of complications


General:  Cooperative, No acute distress, Other (Extremely weak can barely talk)


Heart:  Regular rate (SR/ST), Other (distant heart sounds)


Lungs:  Crackles, Other (l ct)


Abdomen:  Other (drains bilious, mulitple drains )


Extremities:  Other (Diffuse edema)


Skin:  No rashes, No significant lesion





Labs


Labs:





Laboratory Tests








Test


 7/19/20


18:22 7/20/20


00:20 7/20/20


05:55


 


Glucose (Fingerstick)


 102 mg/dL


(70-99) 115 mg/dL


(70-99) 114 mg/dL


(70-99)


 


White Blood Count


 


 


 25.3 x10^3/uL


(4.0-11.0)


 


Red Blood Count


 


 


 2.87 x10^6/uL


(3.50-5.40)


 


Hemoglobin


 


 


 8.1 g/dL


(12.0-15.5)


 


Hematocrit


 


 


 24.8 %


(36.0-47.0)


 


Mean Corpuscular Volume   86 fL () 


 


Mean Corpuscular Hemoglobin   28 pg (25-35) 


 


Mean Corpuscular Hemoglobin


Concent 


 


 33 g/dL


(31-37)


 


Red Cell Distribution Width


 


 


 15.6 %


(11.5-14.5)


 


Platelet Count


 


 


 688 x10^3/uL


(140-400)


 


Neutrophils (%) (Auto)   85 % (31-73) 


 


Lymphocytes (%) (Auto)   8 % (24-48) 


 


Monocytes (%) (Auto)   6 % (0-9) 


 


Eosinophils (%) (Auto)   2 % (0-3) 


 


Basophils (%) (Auto)   0 % (0-3) 


 


Neutrophils # (Auto)


 


 


 21.4 x10^3/uL


(1.8-7.7)


 


Lymphocytes # (Auto)


 


 


 1.9 x10^3/uL


(1.0-4.8)


 


Monocytes # (Auto)


 


 


 1.5 x10^3/uL


(0.0-1.1)


 


Eosinophils # (Auto)


 


 


 0.4 x10^3/uL


(0.0-0.7)


 


Basophils # (Auto)


 


 


 0.1 x10^3/uL


(0.0-0.2)


 


Segmented Neutrophils %   76 % (35-66) 


 


Band Neutrophils %   9 % (0-9) 


 


Lymphocytes %   6 % (24-48) 


 


Monocytes %   7 % (0-10) 


 


Eosinophils %   1 % (0-5) 


 


Basophils %   1 % (0-3) 


 


Nucleated Red Blood Cells   1 


 


Platelet Estimate


 


 


 Increased


(ADEQUATE)


 


Large Platelets   Occ 


 


Anisocytosis   Slight 


 


Sodium Level


 


 


 133 mmol/L


(136-145)


 


Potassium Level


 


 


 4.6 mmol/L


(3.5-5.1)


 


Chloride Level


 


 


 100 mmol/L


()


 


Carbon Dioxide Level


 


 


 31 mmol/L


(21-32)


 


Anion Gap   2 (6-14) 


 


Blood Urea Nitrogen


 


 


 13 mg/dL


(7-20)


 


Creatinine


 


 


 0.6 mg/dL


(0.6-1.0)


 


Estimated GFR


(Cockcroft-Gault) 


 


 106.3 





 


Glucose Level


 


 


 113 mg/dL


(70-99)


 


Calcium Level


 


 


 9.4 mg/dL


(8.5-10.1)











Assessment and Plan


Assessmemt and Plan


Problems


Medical Problems:


(1) Acute pancreatitis


Status: Acute  





(2) Cholelithiasis


Status: Acute  





Postop day 20 (Exploratory laparotomy, lysis of adhesions, subtotal 

cholecystectomy with cholangiogram, gastrojejunostomy tube placement, pancreatic

necrosectomy)


Acute hypoxic Respiratory failure required  mechanical ventilation


Tracheostomy


bilateral pleural effusions/pulm edema s/p Throacentesis on 6/15/2020


Severe Acute gallstone pancreatitis (not a surgical candidate at this time) with

necrosis


Acute kidney failure now requiring dialysis


Gallstones (Calculus of gallbladder with acute cholecystitis without 

obstruction)


HTN 


Intractable pain


Intractable nausea


Covid 19 negative. 


Acute on chronic anemia 


EEG: No seizure activity


Fever  - intermittent 


? Ileus with vomiting


Abd distention - U/S and CT reviewed s/p 0.4 L of opaque, debris-containing 

ascites was removed 5/6


Acute pancreatitis with persistent necrosis


Gallstone pancreatitis with necrosis. 


   -CT A/P 6/6 showed multiple pseudocysts, slight larger on the right. s/p 

drains x 3, 6/7.  + PSAE (MDRO-R Cefepime, Zosyn ANSON < 64) and yeast, 


   -s/p drain 4/27. C. parapsilosis. s/p drain 5/6 + yeast & high amylase; s/p 

additional drain on 5/8. Drains removed. 


Ascites s/p paracentesis 4/15 & 5/6. C. parapsilosis 


JED. off HD. 


A large fluid collection in the pancreatic bed has slightly decreased in size, 

described below, the pancreas itself is difficult to  visualize, which could be 

due to necrosis or obscuration of pancreatic  parenchyma from the surrounding 

fluid collection.6/15 


- 4/27 status post ROBERT drain placement + C paropsilosis. s/p additional drains 

5/8


Anemia - S/p PRBCs. 


Cholelithiasis with thickening of the gallbladder wall.


Leucocytosis improving


JED, hyperkalemia, Metabolic acidosis off dialysis


hypocalcemia 


Prediabetes


HTN


s/p trach


Hyperglycemia


severe protein-caloric malnutrition


Moderate to large left pleural effusion with atelectasis and collapse  of most 

of the left lower lobe, stable


 





Plan


ICU monitoring


TPN


Aggressive wound care


Trend labs


follow cultures. 


meropenam, cipro, micafungin per ID 


PRN trach shield


ID, gen sx, GI, pulm following 


DVT GI prophylaxis


Continue TPN for nutrition support 


poor prognosis


follow labs


xanax for anxiety prn


Appreciate subspecialist input


Long-term prognosis extremely guarded


Total time 33





Comment


Review of Relevant


I have reviewed the following items josy (where applicable) has been applied.


Medications:





Current Medications








 Medications


  (Trade)  Dose


 Ordered  Sig/Yee


 Route


 PRN Reason  Start Time


 Stop Time Status Last Admin


Dose Admin


 


 Sodium Chloride


 110 meq/Sodium


 Phosphate 10 mmol/


 Potassium


 Chloride 30 meq/


 Potassium Acetate


 30 meq/Magnesium


 Sulfate 15 meq/


 Multivitamins 10


 ml/Chromium/


 Copper/Manganese/


 Seleni/Zn 1 ml/


 Insulin Human


 Regular 15 unit/


 Total Parenteral


 Nutrition/Amino


 Acids/Dextrose/


 Fat Emulsion


 Intravenous  1,680 ml @ 


 70 mls/hr  TPN  CONT


 IV


   7/19/20 22:00


 7/20/20 21:59  7/19/20 22:03














Justicifation of Admission Dx:


Justifications for Admission:


Justification of Admission Dx:  Yes











HECTOR MASON III DO           Jul 20, 2020 14:00

## 2020-07-20 NOTE — PDOC
PULMONARY PROGRESS NOTES


Subjective


Patient, off the ventilator.


Vitals





Vital Signs








  Date Time  Temp Pulse Resp B/P (MAP) Pulse Ox O2 Delivery O2 Flow Rate FiO2


 


7/20/20 12:14     96 Nasal Cannula 2.0 


 


7/20/20 06:00  128 28 131/77 (95)    


 


7/20/20 04:00 99.8       





 99.8       








ROS:  No Nausea, No Chest Pain, No Increase Cough


General:  Alert


HEENT:  Other (trach site ok)


Lungs:  Crackles, Other (l ct)


Cardiovascular:  S1, S2


Abdomen:  Soft, Non-tender, Other (multiple ROBERT drains )


Neuro Exam:  Alert


Extremities:  Other (+1 BLE edema)


Skin:  Warm


Labs





Laboratory Tests








Test


 7/18/20


17:47 7/19/20


00:10 7/19/20


05:53 7/19/20


06:00


 


Glucose (Fingerstick)


 96 mg/dL


(70-99) 137 mg/dL


(70-99) 120 mg/dL


(70-99) 





 


Sodium Level


 


 


 


 136 mmol/L


(136-145)


 


Potassium Level


 


 


 


 4.2 mmol/L


(3.5-5.1)


 


Chloride Level


 


 


 


 102 mmol/L


()


 


Carbon Dioxide Level


 


 


 


 30 mmol/L


(21-32)


 


Anion Gap    4 (6-14) 


 


Blood Urea Nitrogen


 


 


 


 10 mg/dL


(7-20)


 


Creatinine


 


 


 


 0.5 mg/dL


(0.6-1.0)


 


Estimated GFR


(Cockcroft-Gault) 


 


 


 131.1 





 


Glucose Level


 


 


 


 129 mg/dL


(70-99)


 


Calcium Level


 


 


 


 9.6 mg/dL


(8.5-10.1)


 


Phosphorus Level


 


 


 


 2.6 mg/dL


(2.6-4.7)


 


Magnesium Level


 


 


 


 2.1 mg/dL


(1.8-2.4)


 


Test


 7/19/20


08:50 7/19/20


12:31 7/19/20


18:22 7/20/20


00:20


 


Creatine Kinase


 11 U/L


() 


 


 





 


Glucose (Fingerstick)


 


 162 mg/dL


(70-99) 102 mg/dL


(70-99) 115 mg/dL


(70-99)


 


Test


 7/20/20


05:55 


 


 





 


White Blood Count


 25.3 x10^3/uL


(4.0-11.0) 


 


 





 


Red Blood Count


 2.87 x10^6/uL


(3.50-5.40) 


 


 





 


Hemoglobin


 8.1 g/dL


(12.0-15.5) 


 


 





 


Hematocrit


 24.8 %


(36.0-47.0) 


 


 





 


Mean Corpuscular Volume 86 fL ()    


 


Mean Corpuscular Hemoglobin 28 pg (25-35)    


 


Mean Corpuscular Hemoglobin


Concent 33 g/dL


(31-37) 


 


 





 


Red Cell Distribution Width


 15.6 %


(11.5-14.5) 


 


 





 


Platelet Count


 688 x10^3/uL


(140-400) 


 


 





 


Neutrophils (%) (Auto) 85 % (31-73)    


 


Lymphocytes (%) (Auto) 8 % (24-48)    


 


Monocytes (%) (Auto) 6 % (0-9)    


 


Eosinophils (%) (Auto) 2 % (0-3)    


 


Basophils (%) (Auto) 0 % (0-3)    


 


Neutrophils # (Auto)


 21.4 x10^3/uL


(1.8-7.7) 


 


 





 


Lymphocytes # (Auto)


 1.9 x10^3/uL


(1.0-4.8) 


 


 





 


Monocytes # (Auto)


 1.5 x10^3/uL


(0.0-1.1) 


 


 





 


Eosinophils # (Auto)


 0.4 x10^3/uL


(0.0-0.7) 


 


 





 


Basophils # (Auto)


 0.1 x10^3/uL


(0.0-0.2) 


 


 





 


Segmented Neutrophils % 76 % (35-66)    


 


Band Neutrophils % 9 % (0-9)    


 


Lymphocytes % 6 % (24-48)    


 


Monocytes % 7 % (0-10)    


 


Eosinophils % 1 % (0-5)    


 


Basophils % 1 % (0-3)    


 


Nucleated Red Blood Cells 1    


 


Platelet Estimate


 Increased


(ADEQUATE) 


 


 





 


Large Platelets Occ    


 


Anisocytosis Slight    


 


Sodium Level


 133 mmol/L


(136-145) 


 


 





 


Potassium Level


 4.6 mmol/L


(3.5-5.1) 


 


 





 


Chloride Level


 100 mmol/L


() 


 


 





 


Carbon Dioxide Level


 31 mmol/L


(21-32) 


 


 





 


Anion Gap 2 (6-14)    


 


Blood Urea Nitrogen


 13 mg/dL


(7-20) 


 


 





 


Creatinine


 0.6 mg/dL


(0.6-1.0) 


 


 





 


Estimated GFR


(Cockcroft-Gault) 106.3 


 


 


 





 


Glucose Level


 113 mg/dL


(70-99) 


 


 





 


Glucose (Fingerstick)


 114 mg/dL


(70-99) 


 


 





 


Calcium Level


 9.4 mg/dL


(8.5-10.1) 


 


 











Laboratory Tests








Test


 7/19/20


18:22 7/20/20


00:20 7/20/20


05:55


 


Glucose (Fingerstick)


 102 mg/dL


(70-99) 115 mg/dL


(70-99) 114 mg/dL


(70-99)


 


White Blood Count


 


 


 25.3 x10^3/uL


(4.0-11.0)


 


Red Blood Count


 


 


 2.87 x10^6/uL


(3.50-5.40)


 


Hemoglobin


 


 


 8.1 g/dL


(12.0-15.5)


 


Hematocrit


 


 


 24.8 %


(36.0-47.0)


 


Mean Corpuscular Volume   86 fL () 


 


Mean Corpuscular Hemoglobin   28 pg (25-35) 


 


Mean Corpuscular Hemoglobin


Concent 


 


 33 g/dL


(31-37)


 


Red Cell Distribution Width


 


 


 15.6 %


(11.5-14.5)


 


Platelet Count


 


 


 688 x10^3/uL


(140-400)


 


Neutrophils (%) (Auto)   85 % (31-73) 


 


Lymphocytes (%) (Auto)   8 % (24-48) 


 


Monocytes (%) (Auto)   6 % (0-9) 


 


Eosinophils (%) (Auto)   2 % (0-3) 


 


Basophils (%) (Auto)   0 % (0-3) 


 


Neutrophils # (Auto)


 


 


 21.4 x10^3/uL


(1.8-7.7)


 


Lymphocytes # (Auto)


 


 


 1.9 x10^3/uL


(1.0-4.8)


 


Monocytes # (Auto)


 


 


 1.5 x10^3/uL


(0.0-1.1)


 


Eosinophils # (Auto)


 


 


 0.4 x10^3/uL


(0.0-0.7)


 


Basophils # (Auto)


 


 


 0.1 x10^3/uL


(0.0-0.2)


 


Segmented Neutrophils %   76 % (35-66) 


 


Band Neutrophils %   9 % (0-9) 


 


Lymphocytes %   6 % (24-48) 


 


Monocytes %   7 % (0-10) 


 


Eosinophils %   1 % (0-5) 


 


Basophils %   1 % (0-3) 


 


Nucleated Red Blood Cells   1 


 


Platelet Estimate


 


 


 Increased


(ADEQUATE)


 


Large Platelets   Occ 


 


Anisocytosis   Slight 


 


Sodium Level


 


 


 133 mmol/L


(136-145)


 


Potassium Level


 


 


 4.6 mmol/L


(3.5-5.1)


 


Chloride Level


 


 


 100 mmol/L


()


 


Carbon Dioxide Level


 


 


 31 mmol/L


(21-32)


 


Anion Gap   2 (6-14) 


 


Blood Urea Nitrogen


 


 


 13 mg/dL


(7-20)


 


Creatinine


 


 


 0.6 mg/dL


(0.6-1.0)


 


Estimated GFR


(Cockcroft-Gault) 


 


 106.3 





 


Glucose Level


 


 


 113 mg/dL


(70-99)


 


Calcium Level


 


 


 9.4 mg/dL


(8.5-10.1)








Medications





Active Scripts








 Medications  Dose


 Route/Sig


 Max Daily Dose Days Date Category


 


 Bisoprolol


 Fumarate 5 Mg


 Tablet  10 Mg


 PO DAILY


   3/16/20 Reported








Comments


cxr 7/13, reviewed,   b ll infilt effusion atelectasis





ct reviewed 7/12/20, Decreased left-sided effusion after catheter placement. The




right-sided effusion has increased as has atelectasis.


 





 


There has been exchange or placement of multiple drainage 


tubes and a gastrojejunostomy tube. Both collections are smaller. No 


significant new abdominal fluid collection is seen. The jejunal component 


of the gastrojejunostomy tube appears to be looped in the proximal small 


bowel. 











ct abdomen /pelvis 6/6


1. Removal of the percutaneous pigtail drainage catheters since the prior 


exam. Sequela of pancreatitis with extensive pseudocysts again 


demonstrated, the right-sided collections are slightly larger since the 


prior exam, the left-sided collections are stable. See above.


2. Moderate to large left pleural effusion with atelectasis and collapse 


of most of the left lower lobe, stable. Small right pleural effusion is 


stable.


3. Gallstone.





ct chest 6/15 reviewed








 GRAM NEG COCCOBACILLI:MANY


        SQUAMOUS EPI CELL:RARE


        PMN (WBCs):[id number]


        Unless otherwise specified, Testing Performed by:


        [address] [address]


        For Inquires, the Physician may contact the Microbiology


        department at [phone]





  RESPIRATORY CULTURE  Final  


        Final





        MANY GRAM NEGATIVE RODS on 06/15/20 at 1105


        FINAL ID= [PSEUDOMONAS AERUGINOSA]


        MICRO CHARGES


        PSEUDOMONAS AERUGINOSA





  ANTIMICROBIAL SUSCEPTIBILITY  Final  


        Comment





        NEG ANSON 56


        PSEUDOMONAS AERUGINOSA


        ANTIBIOTIC                        RESULT          INTERPRETATION


        AMIKACIN                          <=16                  S


        AZTREONAM                         <=4                   S


        CEFTAZIDIME                       <=1                   S


        CIPROFLOXACIN                     <=0.25                S


        CEFEPIME                          <=2                   S


        CEFTAZIDIME/AVIBACTAM             <=4                   S


        GENTAMICIN                        <=2                   S


        LEVOFLOXACIN                      <=0.5                 S





Impression


.





IMPRESSION:


1.  Acute hypoxemic respiratory failure secondary to ARDS status post trach, 

developed anemia 6/7, blood drainage from RLQ abdomen drain site, and 

surrounding firmness  / developed septic shock 6/7 from abdomen source, required

levo 6/7


s/p 3 new drains 6/7 with brown color drainage,  


2.  Gallstone pancreatitis, now with ongoing bleeding from prior drain. Anemic. 

s/p Tx multiple units over several days


3.  septic shock/sepsis, recurrent 6/7, source abdomen. new fever  ? aspiration 

pneumonitis/pneumonia


4.  Acute kidney injury-, Off HD--renal function decling. suspect JED on CKD due

to hypotension , improved now


5.  Acute gallstone pancreatitis.


6.  Hypoalbuminemia.


7.  Moderate persistent effusions, s/p left thora  5/12, reaccumulation of left 

effusion. O2 requirement not changed. 


8.  Fever- ,hypotension. suspect recurrent sepsis/ likely pancreatic source.  

Per ID, per surgery--


9.  Chronic anemia-- ongoing / s/p PRBC


10. Covid 19 testing negative


11. Moderate to large ascites-S/P paracentisis


12.S/P paracentisis with 4 liters removed on 4/15/20


13. S/P IR drain placement on 5/8/2020, removal, re inserted 6/7


14. Depression/Anxiety 


15.,  Fever, per ID


16.  Status post chest tube placement, not much drainage


6/30


S/P Exploratory laparotomy, lysis of adhesions, subtotal cholecystectomy with 

cholangiogram, gastrojejunostomy tube placement, pancreatic necrosectomy


leukocytosis- improving





Plan


.


Continue current support


Chest tube with functional, not draining


Continue current support, monitor H&H, 


Trach shield, as tolerated


Up to chair, pt/ot


Follow culture


Follow surgery input


DVT GI prophylaxis


ABX per ID 


f/u BC /resp cultures 


Continue TPN for nutrition support 


DVT/GI PPX


D/W RN and RT,











STEVE MIRANDA MD              Jul 20, 2020 13:40

## 2020-07-20 NOTE — NUR
SS following up with discharge planning. SS reviewed pt chart and discussed with pt RN. Pt 
is currently on nasal canula oxygen. Pt white cell count increased. Pt on TPN, IV 
Daptomycin, IV Cipro, IV Micafungin, and IV Meropenem. All tubes and drains remain. Med 
Assist working with family to pursue disability application. Pt is currently self pay. SS 
will continue to follow for discharge planning.

## 2020-07-20 NOTE — PDOC
Infectious Disease Note


Subjective:


Subjective


Pt resting quietly


fever pattern improved


on vent  via trach shield 


TPN





Vital Signs:


Vital Signs





Vital Signs








  Date Time  Temp Pulse Resp B/P (MAP) Pulse Ox O2 Delivery O2 Flow Rate FiO2


 


7/20/20 06:00  128 28 131/77 (95) 99 Tracheal Collar  


 


7/20/20 04:00 99.8       





 99.8       


 


7/20/20 04:00       10.0 











Physical Exam:


PHYSICAL EXAM


GENERAL: Propped up in bed, resting quietly 


HEENT: Pupils equal, oral cavity dry. NGT out, 


NECK:  Tracheostomy 


LUNGS: Diminished aeration bases,  CT on left 


HEART:  S1, S2, regular, tachy 110s 


ABDOMEN: Distended, bowel sounds hypoactive, soft, richardson x 2, 3 ROBERT drains, G-J 

tube and + wound vac 


: [place] in place 


EXTREMITIES: Trace generalized edema, no cyanosis. MARTHA hose bilaterally, 


SKIN: warm touch. No signs of rash.  


NEURO: Did not awaken 


LUE-PICC without signs of complications





Medications:


Inpatient Meds:





Current Medications








 Medications


  (Trade)  Dose


 Ordered  Sig/Yee  Start Time


 Stop Time Status Last Admin


Dose Admin


 


 Acetaminophen


  (Tylenol Supp)  650 mg  PRN Q6HRS  PRN  3/24/20 10:30


    7/18/20 21:21


650 MG


 


 Acetaminophen


  (Tylenol)  650 mg  PRN Q6HRS  PRN  3/21/20 03:36


 5/13/20 10:25 DC 4/16/20 19:56


650 MG


 


 Acetylcysteine


  (Mucomyst 20%


 Resp Treatment)  600 mg  RTBID  6/27/20 12:00


    7/19/20 20:01


600 MG


 


 Albumin Human  500 ml @ 


 125 mls/hr  PRN Q1HR  PRN  6/30/20 15:45


     





 


 Albuterol Sulfate


  (Ventolin Neb


 Soln)  2.5 mg  1X  ONCE  3/17/20 22:30


 3/17/20 22:31 DC 3/18/20 00:56


2.5 MG


 


 Albuterol/


 Ipratropium


  (Duoneb)  3 ml  Q4HRS  6/13/20 08:00


    7/20/20 05:14


3 ML


 


 Alprazolam


  (Xanax)  0.5 mg  PRN TID  PRN  7/17/20 08:00


    7/19/20 19:27


0.5 MG


 


 Alteplase,


 Recombinant


  (Cathflo For


 Central Catheter


 Clearance)  1 mg  1X  ONCE  7/19/20 08:15


 7/19/20 08:16 DC 7/19/20 08:23


1 MG


 


 Alteplase,


 Recombinant 4 mg/


 Sodium Chloride  20 ml @ 20


 mls/hr  1X  ONCE  6/17/20 10:00


 6/17/20 10:59 DC 6/17/20 10:09


20 MLS/HR


 


 Amino Acids/


 Glycerin/


 Electrolytes  1,000 ml @ 


 75 mls/hr  [id number]  4/20/20 21:15


   UNV  





 


 Artificial Tears


  (Artificial


 Tears)  1 drop  PRN Q15MIN  PRN  4/29/20 05:30


    6/23/20 21:17


1 DROP


 


 Atenolol


  (Tenormin)  100 mg  DAILY  3/17/20 09:00


 3/16/20 20:08 DC  





 


 Atropine Sulfate


  (ATROPINE 0.5mg


 SYRINGE)  0.5 mg  PRN Q5MIN  PRN  4/2/20 08:15


     





 


 Barium Sulfate


  (Varibar Thin


 Liquid Apple)  148 gm  1X  ONCE  5/26/20 11:45


 5/26/20 11:49 DC  





 


 Benzocaine


  (Hurricaine One)  1 spray  1X  ONCE  3/20/20 14:30


 3/20/20 14:31 DC 3/20/20 16:38


1 SPRAY


 


 Bisacodyl


  (Dulcolax Supp)  10 mg  STK-MED ONCE  4/27/20 10:59


 4/27/20 10:59 DC  





 


 Bumetanide


  (Bumex)  2 mg  DAILY  5/8/20 10:00


 5/18/20 17:15 DC 5/18/20 08:07


2 MG


 


 Bupivacaine HCl/


 Epinephrine Bitart


  (Sensorcain-Epi


 0.5%-1:967487 Mpf)  30 ml  STK-MED ONCE  6/30/20 08:34


 6/30/20 08:35 DC  





 


 Calcium Carbonate/


 Glycine


  (Tums)  500 mg  PRN [id number]  PRN  3/18/20 17:45


 5/13/20 10:25 DC  





 


 Calcium Chloride


 1000 mg/Sodium


 Chloride  110 ml @ 


 220 mls/hr  1X  ONCE  3/17/20 22:30


 3/17/20 22:59 DC 3/17/20 22:11


220 MLS/HR


 


 Calcium Chloride


 3000 mg/Sodium


 Chloride  1,030 ml @ 


 50 mls/hr  [id number]  3/19/20 08:00


 3/21/20 15:23 DC 3/21/20 02:17


50 MLS/HR


 


 Calcium Gluconate


  (Calcium


 Gluconate)  2,000 mg  1X  ONCE  3/19/20 02:15


 3/19/20 02:16 DC 3/19/20 02:19


2,000 MG


 


 Calcium Gluconate


 1000 mg/Sodium


 Chloride  110 ml @ 


 220 mls/hr  1X  ONCE  3/18/20 03:30


 3/18/20 03:59 DC 3/18/20 03:21


220 MLS/HR


 


 Calcium Gluconate


 2000 mg/Sodium


 Chloride  120 ml @ 


 220 mls/hr  1X  ONCE  3/18/20 07:30


 3/18/20 08:02 DC 3/18/20 09:05


220 MLS/HR


 


 Cefepime HCl


  (Maxipime)  2 gm  Q12HR  3/25/20 09:00


 4/8/20 09:58 DC 4/7/20 20:56


2 GM


 


 Cellulose


  (Surgicel


 Fibrillar 1x2)  1 each  STK-MED ONCE  4/6/20 11:00


 4/6/20 11:01 DC  





 


 Cellulose


  (Surgicel


 Hemostat 2x14)  1 each  STK-MED ONCE  4/27/20 10:58


 4/27/20 10:59 DC  





 


 Cellulose


  (Surgicel


 Hemostat 4x8)  1 each  STK-MED ONCE  4/27/20 10:58


 4/27/20 10:59 DC  





 


 Chlorhexidine


 Gluconate


  (Peridex)  15 ml  BID  6/13/20 09:00


 6/13/20 07:58 DC  





 


 Ciprofloxacin/


 Dextrose  200 ml @ 


 200 mls/hr  Q12HR  7/12/20 10:00


    7/19/20 21:22


200 MLS/HR


 


 Cyclobenzaprine


 HCl


  (Flexeril)  10 mg  PRN Q6HRS  PRN  4/30/20 10:45


    7/10/20 19:12


10 MG


 


 Daptomycin 410 mg/


 Sodium Chloride  50 ml @ 


 100 mls/hr  Q24H  6/7/20 14:00


 6/10/20 08:30 DC 6/9/20 13:33


100 MLS/HR


 


 Daptomycin 430 mg/


 Sodium Chloride  50 ml @ 


 100 mls/hr  Q24H  4/25/20 13:00


 4/30/20 20:58 DC 4/30/20 13:00


100 MLS/HR


 


 Daptomycin 450 mg/


 Sodium Chloride  50 ml @ 


 100 mls/hr  Q24H  5/17/20 09:00


 5/21/20 08:30 DC 5/20/20 09:25


100 MLS/HR


 


 Daptomycin 485 mg/


 Sodium Chloride  50 ml @ 


 100 mls/hr  Q24H  5/4/20 11:00


 5/12/20 07:44 DC 5/11/20 13:10


100 MLS/HR


 


 Daptomycin 500 mg/


 Sodium Chloride  50 ml @ 


 100 mls/hr  Q24H  7/15/20 09:00


    7/19/20 09:27


100 MLS/HR


 


 Desflurane


  (Suprane)  90 ml  STK-MED ONCE  6/30/20 10:18


 6/30/20 10:19 DC  





 


 Dexamethasone


 Sodium Phosphate


  (Decadron)  4 mg  STK-MED ONCE  4/27/20 10:56


 4/27/20 10:57 DC  





 


 Dexmedetomidine


 HCl 400 mcg/


 Sodium Chloride  100 ml @ 0


 mls/hr  CONT  PRN  4/2/20 08:15


 5/30/20 18:31 DC 5/30/20 12:57


8 MLS/HR


 


 Dextrose


  (Dextrose


 50%-Water Syringe)  12.5 gm  PRN Q15MIN  PRN  3/16/20 09:30


     





 


 Digoxin


  (Lanoxin)  125 mcg  1X  ONCE  3/19/20 18:00


 3/19/20 18:01 DC 3/19/20 17:10


125 MCG


 


 Diphenhydramine


 HCl


  (Benadryl)  25 mg  1X  ONCE  7/16/20 19:00


 7/16/20 19:01 DC 7/16/20 18:56


25 MG


 


 Duloxetine HCl


  (Cymbalta)  30 mg  DAILY  5/10/20 14:00


 5/13/20 10:25 DC 5/11/20 09:48


30 MG


 


 Enoxaparin Sodium


  (Lovenox 100mg


 Syringe)  100 mg  Q12HR  4/21/20 21:00


   UNV  





 


 Enoxaparin Sodium


  (Lovenox 40mg


 Syringe)  40 mg  Q24H  7/1/20 08:00


    7/19/20 08:23


40 MG


 


 Ephedrine Sulfate


  (ePHEDrine PF IN


 SALINE SYRINGE)  50 mg  STK-MED ONCE  6/30/20 14:45


 6/30/20 14:45 DC  





 


 Etomidate


  (Amidate)  8 mg  1X  ONCE  3/23/20 08:30


 3/23/20 08:31 DC 3/23/20 08:33


8 MG


 


 Fentanyl


  (Duragesic 12mcg/


 Hr Patch)  1 patch  Q3DAYS  7/10/20 09:00


    7/19/20 09:00


1 PATCH


 


 Fentanyl


  (Duragesic 50mcg/


 Hr Patch)  1 patch  Q72H  6/4/20 21:00


 6/13/20 12:00 DC 6/4/20 21:22


1 PATCH


 


 Fentanyl Citrate  30 ml @ 0


 mls/hr  CONT  PRN  7/9/20 17:30


     





 


 Fentanyl Citrate


  (Fentanyl 2ml


 Vial)  100 mcg  STK-MED ONCE  6/30/20 07:44


 6/30/20 07:44 DC  





 


 Fentanyl Citrate


  (Fentanyl 5ml


 Vial)  250 mcg  1X  ONCE  5/8/20 09:15


 5/8/20 09:16 DC 5/8/20 09:30


50 MCG


 


 Flumazenil


  (Romazicon)  0.5 mg  STK-MED ONCE  6/7/20 14:48


 6/7/20 14:48 DC  





 


 Fluoxetine HCl


  (PROzac)  20 mg  QHS  6/4/20 21:00


    7/19/20 21:22


20 MG


 


 Furosemide


  (Lasix)  40 mg  1X  ONCE  6/24/20 15:30


 6/24/20 15:33 DC 6/24/20 16:27


40 MG


 


 Haloperidol


 Lactate


  (Haldol Inj)  3 mg  1X  ONCE  5/4/20 14:30


 5/4/20 14:31 DC 5/4/20 14:37


3 MG


 


 Heparin Sodium


  (Porcine)


  (Hep Lock Adult)  500 unit  STK-MED ONCE  4/7/20 09:29


 4/7/20 09:30 DC  





 


 Heparin Sodium


  (Porcine)


  (Heparin Sodium)  5,000 unit  Q12HR  4/27/20 21:00


 5/7/20 09:59 DC 5/6/20 20:57


5,000 UNIT


 


 Heparin Sodium


  (Porcine) 1000


 unit/Sodium


 Chloride  1,001 ml @ 


 1,001 mls/hr  1X  ONCE  6/30/20 06:00


 6/30/20 06:59 DC  





 


 Hydromorphone HCl


  (Dilaudid


 Standard PCA)  12 mg  STK-MED ONCE  5/1/20 15:50


 5/12/20 11:24 DC  





 


 Hydromorphone HCl


  (Dilaudid)  1 mg  PRN Q4HRS  PRN  5/4/20 19:00


 5/18/20 17:10 DC 5/18/20 06:25


1 MG


 


 Info


  (CONTRAST GIVEN


 -- Rx MONITORING)  1 each  PRN DAILY  PRN  3/30/20 11:45


 4/1/20 11:44 DC  





 


 Info


  (Icu Electrolyte


 Protocol)  1 ea  CONT PRN  PRN  3/29/20 13:15


     





 


 Info


  (PHARMACY


 MONITORING -- do


 not chart)  1 each  PRN DAILY  PRN  4/24/20 15:45


 5/26/20 14:14 DC  





 


 Info


  (Tpn Per


 Pharmacy)  1 each  PRN DAILY  PRN  3/18/20 12:30


   UNV  





 


 Insulin Human


 Lispro


  (HumaLOG)  0-9 UNITS  Q6HRS  3/16/20 09:30


    7/19/20 12:33


4 UNITS


 


 Insulin Human


 Regular


  (HumuLIN R VIAL)  5 unit  1X  ONCE  3/17/20 22:30


 3/17/20 22:31 DC 3/17/20 22:14


5 UNIT


 


 Iohexol


  (Omnipaque 240


 Mg/ml)  30 ml  1X  ONCE  3/30/20 11:30


 3/30/20 11:33 DC 3/30/20 11:30


30 ML


 


 Iohexol


  (Omnipaque 300


 Mg/ml)  50 ml  STK-MED ONCE  6/30/20 08:35


 6/30/20 08:35 DC 6/30/20 13:30


10 ML


 


 Iohexol


  (Omnipaque 350


 Mg/ml)  90 ml  1X  ONCE  3/16/20 03:30


 3/16/20 03:31 DC 3/16/20 03:25


90 ML


 


 Ketorolac


 Tromethamine


  (Toradol 30mg


 Vial)  30 mg  1X  ONCE  3/16/20 03:00


 3/16/20 03:01 DC 3/16/20 02:54


30 MG


 


 Lidocaine HCl


  (Buffered


 Lidocaine 1%)  3 ml  1X  ONCE  6/15/20 13:00


 6/15/20 13:01 DC 6/15/20 13:11


12 ML


 


 Lidocaine HCl


  (Glydo


  (Lidocaine) Jelly)  1 thomas  1X  ONCE  3/20/20 14:30


 3/20/20 14:31 DC 3/20/20 16:38


1 THOMAS


 


 Lidocaine HCl


  (Lidocaine 1%


 20ml Vial)  20 ml  1X  ONCE  6/7/20 15:00


 6/7/20 15:01 DC 6/7/20 15:30


20 ML


 


 Lidocaine HCl


  (Lidocaine Pf 2%


 Vial)  5 ml  STK-MED ONCE  6/30/20 07:44


 6/30/20 07:44 DC  





 


 Lidocaine HCl


  (Xylocaine-Mpf


 1% 2ml Vial)  2 ml  PRN 1X  PRN  4/27/20 07:00


 4/28/20 06:59 DC  





 


 Linezolid/Dextrose  300 ml @ 


 300 mls/hr  Q12HR  5/17/20 09:00


 5/20/20 08:11 DC 5/19/20 21:08


300 MLS/HR


 


 Lorazepam


  (Ativan Inj)  0.25 mg  PRN Q4HRS  PRN  6/3/20 07:30


    7/20/20 03:28


0.25 MG


 


 Magnesium Sulfate  50 ml @ 25


 mls/hr  1X  ONCE  6/30/20 16:30


 6/30/20 18:29 DC 6/30/20 17:02


25 MLS/HR


 


 Meropenem 1 gm/


 Sodium Chloride  100 ml @ 


 200 mls/hr  Q12HR  6/7/20 21:00


 6/25/20 08:56 DC 6/25/20 08:27


200 MLS/HR


 


 Meropenem 500 mg/


 Sodium Chloride  50 ml @ 


 100 mls/hr  Q6HRS  6/28/20 18:00


    7/20/20 05:56


100 MLS/HR


 


 Methylprednisolone


 Sodium Succinate


  (SOLU-Medrol


 125MG VIAL)  125 mg  1X  ONCE  6/13/20 06:15


 6/13/20 06:16 DC 6/13/20 06:26


125 MG


 


 Metoclopramide HCl


  (Reglan Vial)  10 mg  PRN Q3HRS  PRN  5/9/20 16:45


    5/14/20 04:25


10 MG


 


 Metoprolol


 Tartrate


  (Lopressor Vial)  5 mg  PRN Q6HRS  PRN  6/10/20 09:00


    7/18/20 17:50


5 MG


 


 Metronidazole  100 ml @ 


 100 mls/hr  Q8HRS  4/14/20 10:00


 4/21/20 08:10 DC 4/21/20 06:04


100 MLS/HR


 


 Micafungin Sodium


 100 mg/Dextrose  100 ml @ 


 100 mls/hr  Q24H  6/30/20 08:30


    7/19/20 08:24


100 MLS/HR


 


 Midazolam HCl


  (Versed)  2 mg  1X  ONCE  6/7/20 15:00


 6/7/20 15:01 DC 6/7/20 15:28


1 MG


 


 Midazolam HCl 100


 mg/Sodium Chloride  100 ml @ 1


 mls/hr  CONT  PRN  6/30/20 14:45


    7/3/20 18:48


10 MLS/HR


 


 Midazolam HCl 50


 mg/Sodium Chloride  50 ml @ 0


 mls/hr  CONT  PRN  3/23/20 08:15


 3/28/20 15:59 DC 3/26/20 22:39


7 MLS/HR


 


 Morphine Sulfate


  (Morphine


 Sulfate)  1 mg  PRN Q1HR  PRN  6/30/20 14:45


     





 


 Multi-Ingred


 Cream/Lotion/Oil/


 Oint


  (Artificial


 Tears Eye


 Ointment)  1 thomas  PRN Q1HR  PRN  3/25/20 17:30


 6/3/20 14:39 DC 4/13/20 08:19


1 THOMAS


 


 Naloxone HCl


  (Narcan)  0.4 mg  PRN Q2MIN  PRN  6/30/20 14:45


     





 


 Norepinephrine


 Bitartrate 8 mg/


 Dextrose  258 ml @ 


 13.332 mls/


 hr  CONT  PRN  6/7/20 06:30


    7/2/20 09:09


1.6 MLS/HR


 


 Ondansetron HCl


  (Zofran)  4 mg  STK-MED ONCE  6/30/20 13:33


 6/30/20 13:33 DC  





 


 Pantoprazole


 Sodium


  (PROTONIX VIAL


 for IV PUSH)  40 mg  DAILYAC  3/16/20 11:30


    7/19/20 08:22


40 MG


 


 Phenylephrine HCl


  (Ken-Synephrine


 Inj)  10 mg  STK-MED ONCE  6/30/20 13:33


 6/30/20 13:33 DC  





 


 Phenylephrine HCl


  (PHENYLEPHRINE


 in 0.9% NACL PF)  1 mg  STK-MED ONCE  6/30/20 14:44


 6/30/20 14:45 DC  





 


 Piperacillin Sod/


 Tazobactam Sod


 3.375 gm/Sodium


 Chloride  50 ml @ 


 100 mls/hr  Q6HRS  5/27/20 12:00


 6/4/20 07:26 DC 6/4/20 06:10


100 MLS/HR


 


 Piperacillin Sod/


 Tazobactam Sod


 4.5 gm/Sodium


 Chloride  100 ml @ 


 200 mls/hr  1X  ONCE  3/16/20 06:00


 3/16/20 06:29 DC 3/16/20 05:44


200 MLS/HR


 


 Potassium


 Chloride 110 meq/


 Magnesium Sulfate


 20 meq/


 Multivitamins 10


 ml/Chromium/


 Copper/Manganese/


 Seleni/Zn 1 ml/


 Insulin Human


 Regular 15 unit/


 Total Parenteral


 Nutrition/Amino


 Acids/Dextrose/


 Fat Emulsion


 Intravenous  1,800 ml @ 


 75 mls/hr  TPN  CONT  5/24/20 22:00


 5/25/20 21:59 DC 5/24/20 22:48


75 MLS/HR


 


 Potassium


 Chloride 15 meq/


 Bicarbonate


 Dialysis Soln w/


 out KCl  5,007.5 ml


  @ 1,000 mls/


 hr  Q5H1M  3/29/20 20:00


 4/2/20 13:08 DC 4/1/20 18:14


1,000 MLS/HR


 


 Potassium


 Chloride 20 meq/


 Bicarbonate


 Dialysis Soln w/


 out KCl  5,010 ml @ 


 1,000 mls/hr  Q5H1M  3/25/20 16:00


 3/29/20 19:59 DC 3/29/20 14:54


1,000 MLS/HR


 


 Potassium


 Chloride 40 meq/


 Potassium Acetate


 60 meq/Magnesium


 Sulfate 10 meq/


 Multivitamins 10


 ml/Chromium/


 Copper/Manganese/


 Seleni/Zn 1 ml/


 Insulin Human


 Regular 20 unit/


 Total Parenteral


 Nutrition/Amino


 Acids/Dextrose/


 Fat Emulsion


 Intravenous  1,800 ml @ 


 75 mls/hr  TPN  CONT  6/4/20 22:00


 6/5/20 21:59 DC 6/5/20 00:03


75 MLS/HR


 


 Potassium


 Chloride 70 meq/


 Magnesium Sulfate


 20 meq/


 Multivitamins 10


 ml/Chromium/


 Copper/Manganese/


 Seleni/Zn 1 ml/


 Insulin Human


 Regular 15 unit/


 Total Parenteral


 Nutrition/Amino


 Acids/Dextrose/


 Fat Emulsion


 Intravenous  1,800 ml @ 


 75 mls/hr  TPN  CONT  5/29/20 22:00


 5/30/20 21:59 DC 5/29/20 23:13


75 MLS/HR


 


 Potassium


 Chloride 75 meq/


 Magnesium Sulfate


 15 meq/


 Multivitamins 10


 ml/Chromium/


 Copper/Manganese/


 Seleni/Zn 0.5 ml/


 Insulin Human


 Regular 15 unit/


 Total Parenteral


 Nutrition/Amino


 Acids/Dextrose/


 Fat Emulsion


 Intravenous  1,920 ml @ 


 80 mls/hr  TPN  CONT  5/9/20 22:00


 5/10/20 21:59 DC 5/9/20 22:41


80 MLS/HR


 


 Potassium


 Chloride 75 meq/


 Magnesium Sulfate


 15 meq/Calcium


 Gluconate 8 meq/


 Multivitamins 10


 ml/Chromium/


 Copper/Manganese/


 Seleni/Zn 0.5 ml/


 Insulin Human


 Regular 15 unit/


 Total Parenteral


 Nutrition/Amino


 Acids/Dextrose/


 Fat Emulsion


 Intravenous  1,920 ml @ 


 80 mls/hr  TPN  CONT  5/7/20 22:00


 5/8/20 21:59 DC 5/7/20 22:28


80 MLS/HR


 


 Potassium


 Chloride 75 meq/


 Magnesium Sulfate


 15 meq/Calcium


 Gluconate 8 meq/


 Multivitamins 10


 ml/Chromium/


 Copper/Manganese/


 Seleni/Zn 0.5 ml/


 Insulin Human


 Regular 20 unit/


 Total Parenteral


 Nutrition/Amino


 Acids/Dextrose/


 Fat Emulsion


 Intravenous  1,920 ml @ 


 80 mls/hr  TPN  CONT  5/6/20 22:00


 5/7/20 21:59 DC 5/6/20 22:00


80 MLS/HR


 


 Potassium


 Chloride 75 meq/


 Magnesium Sulfate


 15 meq/Calcium


 Gluconate 8 meq/


 Multivitamins 10


 ml/Chromium/


 Copper/Manganese/


 Seleni/Zn 0.5 ml/


 Insulin Human


 Regular 25 unit/


 Total Parenteral


 Nutrition/Amino


 Acids/Dextrose/


 Fat Emulsion


 Intravenous  1,920 ml @ 


 80 mls/hr  TPN  CONT  5/4/20 22:00


 5/5/20 21:59 DC 5/4/20 23:08


80 MLS/HR


 


 Potassium


 Chloride 75 meq/


 Magnesium Sulfate


 20 meq/Calcium


 Gluconate 10 meq/


 Multivitamins 10


 ml/Chromium/


 Copper/Manganese/


 Seleni/Zn 0.5 ml/


 Insulin Human


 Regular 25 unit/


 Total Parenteral


 Nutrition/Amino


 Acids/Dextrose/


 Fat Emulsion


 Intravenous  1,920 ml @ 


 80 mls/hr  TPN  CONT  5/3/20 22:00


 5/4/20 21:59 DC 5/3/20 22:04


80 MLS/HR


 


 Potassium


 Chloride 75 meq/


 Magnesium Sulfate


 20 meq/Calcium


 Gluconate 10 meq/


 Multivitamins 10


 ml/Chromium/


 Copper/Manganese/


 Seleni/Zn 0.5 ml/


 Insulin Human


 Regular 30 unit/


 Total Parenteral


 Nutrition/Amino


 Acids/Dextrose/


 Fat Emulsion


 Intravenous  1,920 ml @ 


 80 mls/hr  TPN  CONT  5/2/20 22:00


 5/3/20 22:00 DC 5/2/20 21:51


80 MLS/HR


 


 Potassium


 Chloride 80 meq/


 Magnesium Sulfate


 20 meq/


 Multivitamins 10


 ml/Chromium/


 Copper/Manganese/


 Seleni/Zn 0.5 ml/


 Insulin Human


 Regular 15 unit/


 Total Parenteral


 Nutrition/Amino


 Acids/Dextrose/


 Fat Emulsion


 Intravenous  1,920 ml @ 


 80 mls/hr  TPN  CONT  5/12/20 22:00


 5/13/20 21:59 DC 5/12/20 21:40


80 MLS/HR


 


 Potassium


 Chloride 80 meq/


 Magnesium Sulfate


 20 meq/


 Multivitamins 10


 ml/Chromium/


 Copper/Manganese/


 Seleni/Zn 1 ml/


 Insulin Human


 Regular 15 unit/


 Total Parenteral


 Nutrition/Amino


 Acids/Dextrose/


 Fat Emulsion


 Intravenous  1,800 ml @ 


 75 mls/hr  TPN  CONT  5/31/20 22:00


 6/1/20 21:59 DC 5/31/20 21:54


75 MLS/HR


 


 Potassium


 Chloride 90 meq/


 Magnesium Sulfate


 20 meq/


 Multivitamins 10


 ml/Chromium/


 Copper/Manganese/


 Seleni/Zn 1 ml/


 Insulin Human


 Regular 15 unit/


 Total Parenteral


 Nutrition/Amino


 Acids/Dextrose/


 Fat Emulsion


 Intravenous  1,800 ml @ 


 75 mls/hr  TPN  CONT  5/20/20 22:00


 5/21/20 21:59 DC 5/20/20 22:28


75 MLS/HR


 


 Potassium


 Chloride 90 meq/


 Magnesium Sulfate


 20 meq/


 Multivitamins 10


 ml/Chromium/


 Copper/Manganese/


 Seleni/Zn 1 ml/


 Insulin Human


 Regular 20 unit/


 Total Parenteral


 Nutrition/Amino


 Acids/Dextrose/


 Fat Emulsion


 Intravenous  1,800 ml @ 


 75 mls/hr  TPN  CONT  6/3/20 22:00


 6/4/20 21:59 DC 6/3/20 23:13


75 MLS/HR


 


 Potassium


 Chloride/Water  100 ml @ 


 100 mls/hr  Q1H  6/17/20 08:00


 6/17/20 09:59 DC 6/17/20 09:12


100 MLS/HR


 


 Potassium


 Phosphate 20 mmol/


 Sodium Chloride  106.6667


 ml @ 


 51.667 m...  1X  ONCE  3/25/20 13:00


 3/25/20 15:03 DC 3/25/20 12:51


51.667 MLS/HR


 


 Potassium Acetate


 30 meq/Magnesium


 Sulfate 14 meq/


 Multivitamins 10


 ml/Chromium/


 Copper/Manganese/


 Seleni/Zn 1 ml/


 Insulin Human


 Regular 15 unit/


 Sodium Chloride


 20 meq/Potassium


 Chloride 30 meq/


 Total Parenteral


 Nutrition/Amino


 Acids/Dextrose/


 Fat Emulsion


 Intravenous  1,920 ml @ 


 80 mls/hr  TPN  CONT  6/23/20 22:00


 6/24/20 21:59 DC 6/23/20 21:46


80 MLS/HR


 


 Potassium Acetate


 30 meq/Magnesium


 Sulfate 20 meq/


 Calcium Gluconate


 10 meq/


 Multivitamins 10


 ml/Chromium/


 Copper/Manganese/


 Seleni/Zn 0.5 ml/


 Insulin Human


 Regular 30 unit/


 Potassium


 Chloride 30 meq/


 Total Parenteral


 Nutrition/Amino


 Acids/Dextrose/


 Fat Emulsion


 Intravenous  1,920 ml @ 


 80 mls/hr  TPN  CONT  5/1/20 22:00


 5/2/20 21:59 DC 5/1/20 22:34


80 MLS/HR


 


 Potassium Acetate


 40 meq/Magnesium


 Sulfate 10 meq/


 Multivitamins 10


 ml/Chromium/


 Copper/Manganese/


 Seleni/Zn 1 ml/


 Insulin Human


 Regular 20 unit/


 Total Parenteral


 Nutrition/Amino


 Acids/Dextrose/


 Fat Emulsion


 Intravenous  1,920 ml @ 


 80 mls/hr  TPN  CONT  6/16/20 22:00


 6/17/20 21:59 DC 6/16/20 21:32


80 MLS/HR


 


 Potassium Acetate


 40 meq/Magnesium


 Sulfate 5 meq/


 Multivitamins 10


 ml/Chromium/


 Copper/Manganese/


 Seleni/Zn 1 ml/


 Insulin Human


 Regular 30 unit/


 Total Parenteral


 Nutrition/Amino


 Acids/Dextrose/


 Fat Emulsion


 Intravenous  1,920 ml @ 


 80 mls/hr  TPN  CONT  6/15/20 22:00


 6/16/20 19:34 DC 6/15/20 21:54


80 MLS/HR


 


 Potassium Acetate


 55 meq/Magnesium


 Sulfate 20 meq/


 Calcium Gluconate


 10 meq/


 Multivitamins 10


 ml/Chromium/


 Copper/Manganese/


 Seleni/Zn 0.5 ml/


 Insulin Human


 Regular 30 unit/


 Total Parenteral


 Nutrition/Amino


 Acids/Dextrose/


 Fat Emulsion


 Intravenous  1,920 ml @ 


 80 mls/hr  TPN  CONT  4/30/20 22:00


 5/1/20 21:59 DC 5/1/20 01:00


80 MLS/HR


 


 Potassium Acetate


 55 meq/Magnesium


 Sulfate 20 meq/


 Calcium Gluconate


 10 meq/


 Multivitamins 10


 ml/Chromium/


 Copper/Manganese/


 Seleni/Zn 0.5 ml/


 Insulin Human


 Regular 35 unit/


 Total Parenteral


 Nutrition/Amino


 Acids/Dextrose/


 Fat Emulsion


 Intravenous  1,920 ml @ 


 80 mls/hr  TPN  CONT  4/28/20 22:00


 4/29/20 21:59 DC 4/28/20 22:02


80 MLS/HR


 


 Potassium Acetate


 60 meq/Magnesium


 Sulfate 10 meq/


 Multivitamins 10


 ml/Chromium/


 Copper/Manganese/


 Seleni/Zn 1 ml/


 Insulin Human


 Regular 20 unit/


 Total Parenteral


 Nutrition/Amino


 Acids/Dextrose/


 Fat Emulsion


 Intravenous  1,920 ml @ 


 80 mls/hr  TPN  CONT  6/17/20 22:00


 6/18/20 21:59 DC 6/17/20 21:55


80 MLS/HR


 


 Potassium Acetate


 60 meq/Magnesium


 Sulfate 14 meq/


 Multivitamins 10


 ml/Chromium/


 Copper/Manganese/


 Seleni/Zn 1 ml/


 Insulin Human


 Regular 15 unit/


 Sodium Chloride


 20 meq/Total


 Parenteral


 Nutrition/Amino


 Acids/Dextrose/


 Fat Emulsion


 Intravenous  1,920 ml @ 


 80 mls/hr  TPN  CONT  6/22/20 22:00


 6/23/20 21:59 DC 6/22/20 21:54


80 MLS/HR


 


 Potassium Acetate


 60 meq/Magnesium


 Sulfate 14 meq/


 Multivitamins 10


 ml/Chromium/


 Copper/Manganese/


 Seleni/Zn 1 ml/


 Insulin Human


 Regular 15 unit/


 Total Parenteral


 Nutrition/Amino


 Acids/Dextrose/


 Fat Emulsion


 Intravenous  1,920 ml @ 


 80 mls/hr  TPN  CONT  6/21/20 22:00


 6/22/20 21:59 DC 6/21/20 22:22


80 MLS/HR


 


 Potassium Acetate


 60 meq/Magnesium


 Sulfate 14 meq/


 Multivitamins 10


 ml/Chromium/


 Copper/Manganese/


 Seleni/Zn 1 ml/


 Insulin Human


 Regular 20 unit/


 Total Parenteral


 Nutrition/Amino


 Acids/Dextrose/


 Fat Emulsion


 Intravenous  1,920 ml @ 


 80 mls/hr  TPN  CONT  6/18/20 22:00


 6/19/20 21:59 DC 6/18/20 22:26


80 MLS/HR


 


 Potassium Acetate


 60 meq/Magnesium


 Sulfate 5 meq/


 Multivitamins 10


 ml/Chromium/


 Copper/Manganese/


 Seleni/Zn 1 ml/


 Insulin Human


 Regular 30 unit/


 Total Parenteral


 Nutrition/Amino


 Acids/Dextrose/


 Fat Emulsion


 Intravenous  1,920 ml @ 


 80 mls/hr  TPN  CONT  6/6/20 22:00


 6/7/20 21:59 DC 6/6/20 21:54


80 MLS/HR


 


 Potassium Acetate


 65 meq/Magnesium


 Sulfate 20 meq/


 Calcium Gluconate


 10 meq/


 Multivitamins 10


 ml/Chromium/


 Copper/Manganese/


 Seleni/Zn 0.5 ml/


 Insulin Human


 Regular 30 unit/


 Total Parenteral


 Nutrition/Amino


 Acids/Dextrose/


 Fat Emulsion


 Intravenous  1,920 ml @ 


 80 mls/hr  TPN  CONT  4/29/20 22:00


 4/30/20 21:59 DC 4/29/20 22:22


80 MLS/HR


 


 Potassium Acetate


 80 meq/Magnesium


 Sulfate 5 meq/


 Multivitamins 10


 ml/Chromium/


 Copper/Manganese/


 Seleni/Zn 1 ml/


 Insulin Human


 Regular 20 unit/


 Total Parenteral


 Nutrition/Amino


 Acids/Dextrose/


 Fat Emulsion


 Intravenous  1,920 ml @ 


 80 mls/hr  TPN  CONT  6/5/20 22:00


 6/6/20 21:59 DC 6/5/20 21:59


80 MLS/HR


 


 Prochlorperazine


 Edisylate


  (Compazine)  5 mg  PACU PRN  PRN  4/27/20 07:00


 4/28/20 06:59 DC  





 


 Propofol


  (Diprivan)  200 mg  STK-MED ONCE  6/30/20 07:44


 6/30/20 07:44 DC  





 


 Ringer's Solution  1,000 ml @ 


 30 mls/hr  Q24H  4/27/20 07:00


 4/27/20 18:59 DC  





 


 Rocuronium Bromide


  (Zemuron)  100 mg  STK-MED ONCE  6/30/20 07:44


 6/30/20 07:44 DC  





 


 Saliva Substitute


  (Biotene


 Moisturizing


 Mouth)  2 spray  PRN Q15MIN  PRN  5/21/20 11:00


     





 


 Sevoflurane


  (Ultane)  60 ml  STK-MED ONCE  4/27/20 12:26


 4/27/20 12:27 DC  





 


 Sodium


 Bicarbonate 150


 meq/Dextrose  1,150 ml @ 


 75 mls/hr  1X  ONCE  6/30/20 16:30


 7/1/20 07:49 DC 6/30/20 20:02


75 MLS/HR


 


 Sodium


 Bicarbonate 50


 meq/Sodium


 Chloride  1,050 ml @ 


 75 mls/hr  Q14H  3/18/20 07:30


 3/23/20 10:28 DC 3/22/20 21:10


75 MLS/HR


 


 Sodium Acetate 50


 meq/Potassium


 Acetate 55 meq/


 Magnesium Sulfate


 20 meq/Calcium


 Gluconate 10 meq/


 Multivitamins 10


 ml/Chromium/


 Copper/Manganese/


 Seleni/Zn 0.5 ml/


 Insulin Human


 Regular 35 unit/


 Total Parenteral


 Nutrition/Amino


 Acids/Dextrose/


 Fat Emulsion


 Intravenous  1,800 ml @ 


 75 mls/hr  TPN  CONT  4/25/20 22:00


 4/26/20 21:59 DC 4/25/20 22:03


75 MLS/HR


 


 Sodium Bicarbonate


  (Sodium Bicarb


 Adult 8.4% Syr)  100 meq  1X  ONCE  6/30/20 16:30


 6/30/20 16:31 DC 6/30/20 17:07


100 MEQ


 


 Sodium Chloride


  (Normal Saline


 Flush)  3 ml  QSHIFT  PRN  6/30/20 14:45


     





 


 Sodium Chloride


 80 meq/Potassium


 Chloride 30 meq/


 Potassium Acetate


 30 meq/Magnesium


 Sulfate 14 meq/


 Multivitamins 10


 ml/Chromium/


 Copper/Manganese/


 Seleni/Zn 1 ml/


 Insulin Human


 Regular 15 unit/


 Total Parenteral


 Nutrition/Amino


 Acids/Dextrose/


 Fat Emulsion


 Intravenous  1,920 ml @ 


 80 mls/hr  TPN  CONT  7/1/20 22:00


 7/2/20 21:59 DC 7/1/20 23:05


80 MLS/HR


 


 Sodium Chloride


 90 meq/Calcium


 Gluconate 10 meq/


 Multivitamins 10


 ml/Chromium/


 Copper/Manganese/


 Seleni/Zn 0.5 ml/


 Total Parenteral


 Nutrition/Amino


 Acids/Dextrose/


 Fat Emulsion


 Intravenous  1,512 ml @ 


 63 mls/hr  TPN  CONT  3/18/20 22:00


 3/19/20 21:59 DC 3/18/20 22:06


63 MLS/HR


 


 Sodium Chloride


 90 meq/Calcium


 Gluconate 10 meq/


 Multivitamins 10


 ml/Chromium/


 Copper/Manganese/


 Seleni/Zn 1 ml/


 Total Parenteral


 Nutrition/Amino


 Acids/Dextrose/


 Fat Emulsion


 Intravenous  55.005 ml


  @ 2.292


 mls/hr  TPN  CONT  3/18/20 22:00


 3/18/20 12:33 DC  





 


 Sodium Chloride


 90 meq/Magnesium


 Sulfate 10 meq/


 Calcium Gluconate


 20 meq/


 Multivitamins 10


 ml/Chromium/


 Copper/Manganese/


 Seleni/Zn 0.5 ml/


 Total Parenteral


 Nutrition/Amino


 Acids/Dextrose/


 Fat Emulsion


 Intravenous  1,512 ml @ 


 63 mls/hr  TPN  CONT  3/19/20 22:00


 3/20/20 21:59 DC 3/19/20 22:25


63 MLS/HR


 


 Sodium Chloride


 90 meq/Magnesium


 Sulfate 12 meq/


 Calcium Gluconate


 15 meq/


 Multivitamins 10


 ml/Chromium/


 Copper/Manganese/


 Seleni/Zn 0.5 ml/


 Insulin Human


 Regular 25 unit/


 Total Parenteral


 Nutrition/Amino


 Acids/Dextrose/


 Fat Emulsion


 Intravenous  1,400 ml @ 


 58.333 mls/


 hr  TPN  CONT  4/8/20 22:00


 4/9/20 21:59 DC 4/8/20 21:41


58.333 MLS/HR


 


 Sodium Chloride


 90 meq/Potassium


 Chloride 15 meq/


 Magnesium Sulfate


 12 meq/Calcium


 Gluconate 15 meq/


 Multivitamins 10


 ml/Chromium/


 Copper/Manganese/


 Seleni/Zn 0.5 ml/


 Insulin Human


 Regular 25 unit/


 Total Parenteral


 Nutrition/Amino


 Acids/Dextrose/


 Fat Emulsion


 Intravenous  1,400 ml @ 


 58.333 mls/


 hr  TPN  CONT  4/7/20 22:00


 4/8/20 21:59 DC 4/7/20 22:13


58.333 MLS/HR


 


 Sodium Chloride


 90 meq/Potassium


 Chloride 15 meq/


 Potassium


 Phosphate 10 mmol/


 Magnesium Sulfate


 8 meq/Calcium


 Gluconate 15 meq/


 Multivitamins 10


 ml/Chromium/


 Copper/Manganese/


 Seleni/Zn 0.5 ml/


 Insulin Human


 Regular 25 unit/


 Total Parenteral


 Nutrition/Amino


 Acids/Dextrose/


 Fat Emulsion


 Intravenous  1,400 ml @ 


 58.333 mls/


 hr  TPN  CONT  4/5/20 22:00


 4/6/20 21:59 DC 4/5/20 21:20


58.333 MLS/HR


 


 Sodium Chloride


 90 meq/Potassium


 Chloride 15 meq/


 Potassium


 Phosphate 10 mmol/


 Magnesium Sulfate


 10 meq/Calcium


 Gluconate 20 meq/


 Multivitamins 10


 ml/Chromium/


 Copper/Manganese/


 Seleni/Zn 0.5 ml/


 Total Parenteral


 Nutrition/Amino


 Acids/Dextrose/


 Fat Emulsion


 Intravenous  1,400 ml @ 


 58.333 mls/


 hr  TPN  CONT  3/23/20 22:00


 3/24/20 21:59 DC 3/23/20 21:42


58.333 MLS/HR


 


 Sodium Chloride


 90 meq/Potassium


 Chloride 15 meq/


 Potassium


 Phosphate 10 mmol/


 Magnesium Sulfate


 12 meq/Calcium


 Gluconate 15 meq/


 Multivitamins 10


 ml/Chromium/


 Copper/Manganese/


 Seleni/Zn 0.5 ml/


 Insulin Human


 Regular 25 unit/


 Total Parenteral


 Nutrition/Amino


 Acids/Dextrose/


 Fat Emulsion


 Intravenous  1,400 ml @ 


 58.333 mls/


 hr  TPN  CONT  4/6/20 22:00


 4/7/20 21:59 DC 4/6/20 22:24


58.333 MLS/HR


 


 Sodium Chloride


 90 meq/Potassium


 Chloride 15 meq/


 Potassium


 Phosphate 15 mmol/


 Magnesium Sulfate


 10 meq/Calcium


 Gluconate 15 meq/


 Multivitamins 10


 ml/Chromium/


 Copper/Manganese/


 Seleni/Zn 0.5 ml/


 Total Parenteral


 Nutrition/Amino


 Acids/Dextrose/


 Fat Emulsion


 Intravenous  1,400 ml @ 


 58.333 mls/


 hr  TPN  CONT  3/24/20 22:00


 3/25/20 21:59 DC 3/24/20 22:17


58.333 MLS/HR


 


 Sodium Chloride


 90 meq/Potassium


 Chloride 15 meq/


 Potassium


 Phosphate 15 mmol/


 Magnesium Sulfate


 10 meq/Calcium


 Gluconate 20 meq/


 Multivitamins 10


 ml/Chromium/


 Copper/Manganese/


 Seleni/Zn 0.5 ml/


 Total Parenteral


 Nutrition/Amino


 Acids/Dextrose/


 Fat Emulsion


 Intravenous  1,200 ml @ 


 50 mls/hr  TPN  CONT  3/22/20 22:00


 3/22/20 14:17 DC  





 


 Sodium Chloride


 90 meq/Potassium


 Chloride 15 meq/


 Potassium


 Phosphate 18 mmol/


 Magnesium Sulfate


 8 meq/Calcium


 Gluconate 15 meq/


 Multivitamins 10


 ml/Chromium/


 Copper/Manganese/


 Seleni/Zn 0.5 ml/


 Insulin Human


 Regular 10 unit/


 Total Parenteral


 Nutrition/Amino


 Acids/Dextrose/


 Fat Emulsion


 Intravenous  1,400 ml @ 


 58.333 mls/


 hr  TPN  CONT  3/27/20 22:00


 3/28/20 21:59 DC 3/27/20 21:43


58.333 MLS/HR


 


 Sodium Chloride


 90 meq/Potassium


 Chloride 15 meq/


 Potassium


 Phosphate 18 mmol/


 Magnesium Sulfate


 8 meq/Calcium


 Gluconate 15 meq/


 Multivitamins 10


 ml/Chromium/


 Copper/Manganese/


 Seleni/Zn 0.5 ml/


 Insulin Human


 Regular 15 unit/


 Total Parenteral


 Nutrition/Amino


 Acids/Dextrose/


 Fat Emulsion


 Intravenous  1,400 ml @ 


 58.333 mls/


 hr  TPN  CONT  3/30/20 22:00


 3/31/20 21:59 DC 3/30/20 21:47


58.333 MLS/HR


 


 Sodium Chloride


 90 meq/Potassium


 Chloride 15 meq/


 Potassium


 Phosphate 18 mmol/


 Magnesium Sulfate


 8 meq/Calcium


 Gluconate 15 meq/


 Multivitamins 10


 ml/Chromium/


 Copper/Manganese/


 Seleni/Zn 0.5 ml/


 Insulin Human


 Regular 20 unit/


 Total Parenteral


 Nutrition/Amino


 Acids/Dextrose/


 Fat Emulsion


 Intravenous  1,400 ml @ 


 58.333 mls/


 hr  TPN  CONT  4/2/20 22:00


 4/3/20 21:59 DC 4/2/20 22:45


58.333 MLS/HR


 


 Sodium Chloride


 90 meq/Potassium


 Chloride 15 meq/


 Potassium


 Phosphate 18 mmol/


 Magnesium Sulfate


 8 meq/Calcium


 Gluconate 15 meq/


 Multivitamins 10


 ml/Chromium/


 Copper/Manganese/


 Seleni/Zn 0.5 ml/


 Total Parenteral


 Nutrition/Amino


 Acids/Dextrose/


 Fat Emulsion


 Intravenous  1,400 ml @ 


 58.333 mls/


 hr  TPN  CONT  3/26/20 22:00


 3/27/20 21:59 DC 3/26/20 22:00


58.333 MLS/HR


 


 Sodium Chloride


 90 meq/Potassium


 Chloride 30 meq/


 Potassium Acetate


 30 meq/Magnesium


 Sulfate 15 meq/


 Multivitamins 10


 ml/Chromium/


 Copper/Manganese/


 Seleni/Zn 1 ml/


 Insulin Human


 Regular 15 unit/


 Total Parenteral


 Nutrition/Amino


 Acids/Dextrose/


 Fat Emulsion


 Intravenous  1,680 ml @ 


 70 mls/hr  TPN  CONT  7/16/20 22:00


 7/17/20 21:59 DC 7/16/20 22:06


70 MLS/HR


 


 Sodium Chloride


 90 meq/Potassium


 Phosphate 15 mmol/


 Magnesium Sulfate


 12 meq/Calcium


 Gluconate 15 meq/


 Multivitamins 10


 ml/Chromium/


 Copper/Manganese/


 Seleni/Zn 0.5 ml/


 Insulin Human


 Regular 30 unit/


 Total Parenteral


 Nutrition/Amino


 Acids/Dextrose/


 Fat Emulsion


 Intravenous  1,400 ml @ 


 58.333 mls/


 hr  TPN  CONT  4/10/20 22:00


 4/11/20 21:59 DC 4/10/20 21:49


58.333 MLS/HR


 


 Sodium Chloride


 90 meq/Potassium


 Phosphate 15 mmol/


 Magnesium Sulfate


 12 meq/Calcium


 Gluconate 15 meq/


 Multivitamins 10


 ml/Chromium/


 Copper/Manganese/


 Seleni/Zn 0.5 ml/


 Insulin Human


 Regular 40 unit/


 Total Parenteral


 Nutrition/Amino


 Acids/Dextrose/


 Fat Emulsion


 Intravenous  1,400 ml @ 


 58.333 mls/


 hr  TPN  CONT  4/11/20 22:00


 4/12/20 21:59 DC 4/11/20 21:21


58.333 MLS/HR


 


 Sodium Chloride


 90 meq/Potassium


 Phosphate 19 mmol/


 Magnesium Sulfate


 12 meq/Calcium


 Gluconate 15 meq/


 Multivitamins 10


 ml/Chromium/


 Copper/Manganese/


 Seleni/Zn 0.5 ml/


 Insulin Human


 Regular 40 unit/


 Total Parenteral


 Nutrition/Amino


 Acids/Dextrose/


 Fat Emulsion


 Intravenous  1,400 ml @ 


 58.333 mls/


 hr  TPN  CONT  4/12/20 22:00


 4/13/20 21:59 DC 4/12/20 21:54


58.333 MLS/HR


 


 Sodium Chloride


 90 meq/Potassium


 Phosphate 5 mmol/


 Magnesium Sulfate


 12 meq/Calcium


 Gluconate 15 meq/


 Multivitamins 10


 ml/Chromium/


 Copper/Manganese/


 Seleni/Zn 0.5 ml/


 Insulin Human


 Regular 30 unit/


 Total Parenteral


 Nutrition/Amino


 Acids/Dextrose/


 Fat Emulsion


 Intravenous  1,400 ml @ 


 58.333 mls/


 hr  TPN  CONT  4/9/20 22:00


 4/10/20 21:59 DC 4/9/20 22:08


58.333 MLS/HR


 


 Sodium Chloride


 100 meq/Potassium


 Chloride 30 meq/


 Potassium Acetate


 30 meq/Magnesium


 Sulfate 12 meq/


 Multivitamins 10


 ml/Chromium/


 Copper/Manganese/


 Seleni/Zn 1 ml/


 Insulin Human


 Regular 15 unit/


 Total Parenteral


 Nutrition/Amino


 Acids/Dextrose/


 Fat Emulsion


 Intravenous  1,680 ml @ 


 70 mls/hr  TPN  CONT  7/5/20 22:00


 7/6/20 21:59 DC 7/5/20 21:23


70 MLS/HR


 


 Sodium Chloride


 100 meq/Potassium


 Chloride 40 meq/


 Magnesium Sulfate


 15 meq/Calcium


 Gluconate 15 meq/


 Multivitamins 10


 ml/Chromium/


 Copper/Manganese/


 Seleni/Zn 0.5 ml/


 Insulin Human


 Regular 35 unit/


 Total Parenteral


 Nutrition/Amino


 Acids/Dextrose/


 Fat Emulsion


 Intravenous  1,400 ml @ 


 58.333 mls/


 hr  TPN  CONT  4/19/20 22:00


 4/20/20 21:59 DC 4/19/20 22:46


58.333 MLS/HR


 


 Sodium Chloride


 100 meq/Potassium


 Chloride 40 meq/


 Magnesium Sulfate


 20 meq/Calcium


 Gluconate 10 meq/


 Multivitamins 10


 ml/Chromium/


 Copper/Manganese/


 Seleni/Zn 0.5 ml/


 Insulin Human


 Regular 35 unit/


 Total Parenteral


 Nutrition/Amino


 Acids/Dextrose/


 Fat Emulsion


 Intravenous  1,400 ml @ 


 58.333 mls/


 hr  TPN  CONT  4/23/20 22:00


 4/24/20 21:59 DC 4/24/20 00:06


58.333 MLS/HR


 


 Sodium Chloride


 100 meq/Potassium


 Chloride 40 meq/


 Magnesium Sulfate


 20 meq/Calcium


 Gluconate 15 meq/


 Multivitamins 10


 ml/Chromium/


 Copper/Manganese/


 Seleni/Zn 0.5 ml/


 Insulin Human


 Regular 35 unit/


 Total Parenteral


 Nutrition/Amino


 Acids/Dextrose/


 Fat Emulsion


 Intravenous  1,400 ml @ 


 58.333 mls/


 hr  TPN  CONT  4/22/20 22:00


 4/23/20 21:59 DC 4/22/20 22:27


58.333 MLS/HR


 


 Sodium Chloride


 100 meq/Potassium


 Phosphate 10 mmol/


 Magnesium Sulfate


 12 meq/Calcium


 Gluconate 15 meq/


 Multivitamins 10


 ml/Chromium/


 Copper/Manganese/


 Seleni/Zn 0.5 ml/


 Insulin Human


 Regular 35 unit/


 Potassium


 Chloride 20 meq/


 Total Parenteral


 Nutrition/Amino


 Acids/Dextrose/


 Fat Emulsion


 Intravenous  1,400 ml @ 


 58.333 mls/


 hr  TPN  CONT  4/16/20 22:00


 4/17/20 21:59 DC 4/16/20 22:10


58.333 MLS/HR


 


 Sodium Chloride


 100 meq/Potassium


 Phosphate 19 mmol/


 Magnesium Sulfate


 12 meq/Calcium


 Gluconate 15 meq/


 Multivitamins 10


 ml/Chromium/


 Copper/Manganese/


 Seleni/Zn 0.5 ml/


 Insulin Human


 Regular 40 unit/


 Potassium


 Chloride 20 meq/


 Total Parenteral


 Nutrition/Amino


 Acids/Dextrose/


 Fat Emulsion


 Intravenous  1,400 ml @ 


 58.333 mls/


 hr  TPN  CONT  4/15/20 22:00


 4/16/20 21:59 DC 4/15/20 21:20


58.333 MLS/HR


 


 Sodium Chloride


 100 meq/Potassium


 Phosphate 5 mmol/


 Magnesium Sulfate


 12 meq/Calcium


 Gluconate 15 meq/


 Multivitamins 10


 ml/Chromium/


 Copper/Manganese/


 Seleni/Zn 0.5 ml/


 Insulin Human


 Regular 35 unit/


 Potassium


 Chloride 20 meq/


 Total Parenteral


 Nutrition/Amino


 Acids/Dextrose/


 Fat Emulsion


 Intravenous  1,400 ml @ 


 58.333 mls/


 hr  TPN  CONT  4/17/20 22:00


 4/18/20 21:59 DC 4/17/20 22:59


58.333 MLS/HR


 


 Sodium Chloride


 110 meq/Potassium


 Chloride 30 meq/


 Potassium Acetate


 30 meq/Magnesium


 Sulfate 15 meq/


 Multivitamins 10


 ml/Chromium/


 Copper/Manganese/


 Seleni/Zn 1 ml/


 Insulin Human


 Regular 15 unit/


 Total Parenteral


 Nutrition/Amino


 Acids/Dextrose/


 Fat Emulsion


 Intravenous  1,680 ml @ 


 70 mls/hr  TPN  CONT  7/18/20 22:00


 7/19/20 21:59 DC 7/18/20 22:01


70 MLS/HR


 


 Sodium Chloride


 110 meq/Sodium


 Phosphate 10 mmol/


 Potassium


 Chloride 30 meq/


 Potassium Acetate


 30 meq/Magnesium


 Sulfate 15 meq/


 Multivitamins 10


 ml/Chromium/


 Copper/Manganese/


 Seleni/Zn 1 ml/


 Insulin Human


 Regular 15 unit/


 Total Parenteral


 Nutrition/Amino


 Acids/Dextrose/


 Fat Emulsion


 Intravenous  1,680 ml @ 


 70 mls/hr  TPN  CONT  7/19/20 22:00


 7/20/20 21:59  7/19/20 22:03


70 MLS/HR


 


 Succinylcholine


 Chloride


  (Anectine)  120 mg  1X  ONCE  3/23/20 08:30


 3/23/20 08:31 DC 3/23/20 08:34


120 MG


 


 Vancomycin HCl


  (Vanco Per


 Pharmacy)  1 each  PRN DAILY  PRN  7/12/20 09:15


 7/15/20 07:41 DC 7/14/20 02:46


1 EACH


 


 Vancomycin HCl


  (Vancomycin


 Random Level)  1 each  1X  ONCE  7/14/20 01:00


 7/14/20 01:01 DC 7/14/20 01:00


1 EACH


 


 Vancomycin HCl


  (Vancomycin


 Trough Level)  1 each  1X  ONCE  7/15/20 09:30


 7/15/20 09:31 Cancel  





 


 Vancomycin HCl


 1.5 gm/Sodium


 Chloride  500 ml @ 


 250 mls/hr  Q12H  7/14/20 10:00


 7/15/20 07:41 DC 7/14/20 22:07


250 MLS/HR


 


 Vancomycin HCl 2


 gm/Sodium Chloride  500 ml @ 


 250 mls/hr  1X  ONCE  7/12/20 10:00


 7/12/20 11:59 DC 7/12/20 10:34


250 MLS/HR


 


 Vasopressin


  (Vasostrict)  20 unit  STK-MED ONCE  6/30/20 12:23


 6/30/20 12:23 DC  





 


 Vasopressin 20


 unit/Dextrose  101 ml @ 


 12 mls/hr  CONT  PRN  6/30/20 15:30


    7/7/20 04:17


12 MLS/HR


 


 Vecuronium Bromide


  (Norcuron Bolus)  6 mg  PRN Q6HRS  PRN  5/7/20 19:15


 5/7/20 19:35 DC  














Labs:


Lab





Laboratory Tests








Test


 7/19/20


08:50 7/19/20


12:31 7/19/20


18:22 7/20/20


00:20


 


Creatine Kinase


 11 U/L


() 


 


 





 


Glucose (Fingerstick)


 


 162 mg/dL


(70-99) 102 mg/dL


(70-99) 115 mg/dL


(70-99)


 


Test


 7/20/20


05:55 


 


 





 


White Blood Count


 25.3 x10^3/uL


(4.0-11.0) 


 


 





 


Red Blood Count


 2.87 x10^6/uL


(3.50-5.40) 


 


 





 


Hemoglobin


 8.1 g/dL


(12.0-15.5) 


 


 





 


Hematocrit


 24.8 %


(36.0-47.0) 


 


 





 


Mean Corpuscular Volume 86 fL ()    


 


Mean Corpuscular Hemoglobin 28 pg (25-35)    


 


Mean Corpuscular Hemoglobin


Concent 33 g/dL


(31-37) 


 


 





 


Red Cell Distribution Width


 15.6 %


(11.5-14.5) 


 


 





 


Platelet Count


 688 x10^3/uL


(140-400) 


 


 





 


Neutrophils (%) (Auto) 85 % (31-73)    


 


Lymphocytes (%) (Auto) 8 % (24-48)    


 


Monocytes (%) (Auto) 6 % (0-9)    


 


Eosinophils (%) (Auto) 2 % (0-3)    


 


Basophils (%) (Auto) 0 % (0-3)    


 


Neutrophils # (Auto)


 21.4 x10^3/uL


(1.8-7.7) 


 


 





 


Lymphocytes # (Auto)


 1.9 x10^3/uL


(1.0-4.8) 


 


 





 


Monocytes # (Auto)


 1.5 x10^3/uL


(0.0-1.1) 


 


 





 


Eosinophils # (Auto)


 0.4 x10^3/uL


(0.0-0.7) 


 


 





 


Basophils # (Auto)


 0.1 x10^3/uL


(0.0-0.2) 


 


 





 


Sodium Level


 133 mmol/L


(136-145) 


 


 





 


Potassium Level


 4.6 mmol/L


(3.5-5.1) 


 


 





 


Chloride Level


 100 mmol/L


() 


 


 





 


Carbon Dioxide Level


 31 mmol/L


(21-32) 


 


 





 


Anion Gap 2 (6-14)    


 


Blood Urea Nitrogen


 13 mg/dL


(7-20) 


 


 





 


Creatinine


 0.6 mg/dL


(0.6-1.0) 


 


 





 


Estimated GFR


(Cockcroft-Gault) 106.3 


 


 


 





 


Glucose Level


 113 mg/dL


(70-99) 


 


 





 


Glucose (Fingerstick)


 114 mg/dL


(70-99) 


 


 





 


Calcium Level


 9.4 mg/dL


(8.5-10.1) 


 


 











Micro


        NEG ANSON 56


        PSEUDOMONAS AERUGINOSA


        ANTIBIOTIC                        RESULT          INTERPRETATION


        AMIKACIN                          <=16                  S


        AZTREONAM                         >16                   R


        CEFTAZIDIME                       >16                   R


        CIPROFLOXACIN                     <=0.25                S


        CEFEPIME                          16                    I


        GENTAMICIN                        <=2                   S


        LEVOFLOXACIN                      <=0.5                 S














                               ** CONTINUED ON NEXT PAGE **





-------------

-------------------------------------------------------------------------------





RUN DATE: 06/10/20                  [facility] *LIVE*               

  PAGE 2   


RUN TIME: 1121                            Specimen Inquiry                    


---

--------------------------------------------------------------------------------


---------





SPEC: [id number]    PATIENT: JESENIA BEAN                [id number]  

(Continued)


----------------------------------------------------------------------------

----------------








 

--------------------------------------------------------------------------------


------------





  Procedure                         Result                                      

         


----------------------------------------------------

----------------------------------------





  ANTIMICROBIAL SUSCEPTIBILITY  Preliminary   (continued)


        MEROPENEM                         <=1                   S


        PIPERACILLIN/TAZOBACTAM           64                    S


        TOBRAMYCIN                        <=2                   S


        Unless otherwise specified, Testing Performed by:


        [address] [address]


        For Inquires, the Physician may contact the Microbiology


        department at [phone]





 

--------------------------------------------------------------------------------


------------





Objective:


Assessment:


Patient with prolonged hospitalization more than 3 months


Multiple medical problems


Multiple surgical procedures





Pt underwent 


  Exploratory laparotomy, lysis of adhesions, subtotal cholecystectomy with 

cholangiogram, gastrojejunostomy tube placement, pancreatic necrosectomy June 30


Leucocytosis Leukomoid reaction likely postoperative


Fever intermittently source likely GI


Acute pancreatitis with persistent  necrosis


CT a/p 4/9


    Increased ascites. Persistent evidence of necrotizing pancreatitis with 

fluid and phlegmon


   at the pancreas


   4/27 status post ROBERT drain placement; yeast


   5/6 fluid  candida parapsilosis fluid amylase high


CT 6/7





1.   Sequela of pancreatitis with extensive pseudocysts again 


demonstrated, the right-sided collections are slightly larger since the 


prior exam, the left-sided collections are stable. 


6/7 fluid cult PSAE (MDRO),yeast; treated





Cholelithiasis with thickening of the gallbladder wall.


Leucocytosis 


JED,Hyperkalemia, Metabolic acidosis off dialysis


Acute hypoxic resp failure ,bilateral pleural effusion and atelectasis


hypocalcemia 


Prediabetes


HTN


s/p trach


Pleural effusion status post CTS left side


 


Abdominal fluid culture 6 /7 MDRO Pseudomonas, yeast


 Fevers intermitent throughout this hosp





Plan:


Plan of Care


Meropenem, cipro (7/12), micafungin and dapto


BC from 7/12 neg to date 


Resp culture pending


Monitor WBC/temp 


Wound care /drain management as directed


Leucocytosis could be from not adequate drainage ...


Gen surgery input noted, appears that tube is almost pulled out...


Dr Flores to evaluate today per team


C diff neg 7/12


Contact isolation for CRE/MDRO


D/w nursing 





Critically ill





Long term prognosis poor











IVAN FRANZ MD           Jul 20, 2020 08:29

## 2020-07-20 NOTE — PDOC
PROGRESS NOTES


Assessment


Problems


Medical Problems:


(1) Acute pancreatitis


Status: Acute  





(2) Cholelithiasis


Status: Acute  





Critical illness neuromyopathy


Single seizure on 3/6, no recurrence.


Metabolic encephalopathy.


Fevers.


Metabolic acidosis.


Diffuse pulmonary infiltrate.


Pleural effusion.


Pancreatitis, necrotizing.


Gallstone.


Leukocytosis.


Lymphopenia.


Electrolytes imbalances.


Hyperglycemia.


DM.


HTN.


HLD.


Anemia.


Abnormal CXR.


Obesity.


Ascites and pleural effusion


Ileus with vomiting


Anemia 


JED


Hyperkalemia


Metabolic acidosis


Hypertension


S/P trache, back on vent


Anemia


S/P IR drain placement, 6/7


Hypotension, intermittently requiring Levophed


She had exploratory laparotomy, lysis of adhesions, subtotal cholecystectomy 

with cholangiogram, gastrojejunostomy tube placement, pancreatic necrosectomy on

6/30


Plan


Keppra if she has further seizures.


Treat medical diseases.


Subjective


None


Objective





Vital Signs








  Date Time  Temp Pulse Resp B/P (MAP) Pulse Ox O2 Delivery O2 Flow Rate FiO2


 


7/20/20 08:49     99 Tracheal Collar 8.0 


 


7/20/20 06:00  128 28 131/77 (95)    


 


7/20/20 04:00 99.8       





 99.8       














Intake and Output 


 


 7/20/20





 07:00


 


Intake Total 2409.4 ml


 


Output Total 2520 ml


 


Balance -110.6 ml


 


 


 


IV Total 2409.4 ml


 


Output Urine Total 1730 ml


 


Gastric Drainage Total 350 ml


 


Chest Tube Drainage Total 160 ml


 


Drainage Total 280 ml








PHYSICAL EXAM


Alert, off vent, Nonverbal, does not follow commands


PERRL.


EOMI.


CN: no focal findings.


Muscle tone: normal.


Muscle strength:  2/5 strength


DTR: 1+


Plantar reflex: Silent


Gait: not examined in bed.


Sensory exam: nonfocal


Cerebellar:  not testable


Review of Relevant


I have reviewed the following items josy (where applicable) has been applied.


Labs





Laboratory Tests








Test


 7/18/20


17:47 7/19/20


00:10 7/19/20


05:53 7/19/20


06:00


 


Glucose (Fingerstick)


 96 mg/dL


(70-99) 137 mg/dL


(70-99) 120 mg/dL


(70-99) 





 


Sodium Level


 


 


 


 136 mmol/L


(136-145)


 


Potassium Level


 


 


 


 4.2 mmol/L


(3.5-5.1)


 


Chloride Level


 


 


 


 102 mmol/L


()


 


Carbon Dioxide Level


 


 


 


 30 mmol/L


(21-32)


 


Anion Gap    4 (6-14) 


 


Blood Urea Nitrogen


 


 


 


 10 mg/dL


(7-20)


 


Creatinine


 


 


 


 0.5 mg/dL


(0.6-1.0)


 


Estimated GFR


(Cockcroft-Gault) 


 


 


 131.1 





 


Glucose Level


 


 


 


 129 mg/dL


(70-99)


 


Calcium Level


 


 


 


 9.6 mg/dL


(8.5-10.1)


 


Phosphorus Level


 


 


 


 2.6 mg/dL


(2.6-4.7)


 


Magnesium Level


 


 


 


 2.1 mg/dL


(1.8-2.4)


 


Test


 7/19/20


08:50 7/19/20


12:31 7/19/20


18:22 7/20/20


00:20


 


Creatine Kinase


 11 U/L


() 


 


 





 


Glucose (Fingerstick)


 


 162 mg/dL


(70-99) 102 mg/dL


(70-99) 115 mg/dL


(70-99)


 


Test


 7/20/20


05:55 


 


 





 


White Blood Count


 25.3 x10^3/uL


(4.0-11.0) 


 


 





 


Red Blood Count


 2.87 x10^6/uL


(3.50-5.40) 


 


 





 


Hemoglobin


 8.1 g/dL


(12.0-15.5) 


 


 





 


Hematocrit


 24.8 %


(36.0-47.0) 


 


 





 


Mean Corpuscular Volume 86 fL ()    


 


Mean Corpuscular Hemoglobin 28 pg (25-35)    


 


Mean Corpuscular Hemoglobin


Concent 33 g/dL


(31-37) 


 


 





 


Red Cell Distribution Width


 15.6 %


(11.5-14.5) 


 


 





 


Platelet Count


 688 x10^3/uL


(140-400) 


 


 





 


Neutrophils (%) (Auto) 85 % (31-73)    


 


Lymphocytes (%) (Auto) 8 % (24-48)    


 


Monocytes (%) (Auto) 6 % (0-9)    


 


Eosinophils (%) (Auto) 2 % (0-3)    


 


Basophils (%) (Auto) 0 % (0-3)    


 


Neutrophils # (Auto)


 21.4 x10^3/uL


(1.8-7.7) 


 


 





 


Lymphocytes # (Auto)


 1.9 x10^3/uL


(1.0-4.8) 


 


 





 


Monocytes # (Auto)


 1.5 x10^3/uL


(0.0-1.1) 


 


 





 


Eosinophils # (Auto)


 0.4 x10^3/uL


(0.0-0.7) 


 


 





 


Basophils # (Auto)


 0.1 x10^3/uL


(0.0-0.2) 


 


 





 


Sodium Level


 133 mmol/L


(136-145) 


 


 





 


Potassium Level


 4.6 mmol/L


(3.5-5.1) 


 


 





 


Chloride Level


 100 mmol/L


() 


 


 





 


Carbon Dioxide Level


 31 mmol/L


(21-32) 


 


 





 


Anion Gap 2 (6-14)    


 


Blood Urea Nitrogen


 13 mg/dL


(7-20) 


 


 





 


Creatinine


 0.6 mg/dL


(0.6-1.0) 


 


 





 


Estimated GFR


(Cockcroft-Gault) 106.3 


 


 


 





 


Glucose Level


 113 mg/dL


(70-99) 


 


 





 


Glucose (Fingerstick)


 114 mg/dL


(70-99) 


 


 





 


Calcium Level


 9.4 mg/dL


(8.5-10.1) 


 


 











Laboratory Tests








Test


 7/19/20


12:31 7/19/20


18:22 7/20/20


00:20 7/20/20


05:55


 


Glucose (Fingerstick)


 162 mg/dL


(70-99) 102 mg/dL


(70-99) 115 mg/dL


(70-99) 114 mg/dL


(70-99)


 


White Blood Count


 


 


 


 25.3 x10^3/uL


(4.0-11.0)


 


Red Blood Count


 


 


 


 2.87 x10^6/uL


(3.50-5.40)


 


Hemoglobin


 


 


 


 8.1 g/dL


(12.0-15.5)


 


Hematocrit


 


 


 


 24.8 %


(36.0-47.0)


 


Mean Corpuscular Volume    86 fL () 


 


Mean Corpuscular Hemoglobin    28 pg (25-35) 


 


Mean Corpuscular Hemoglobin


Concent 


 


 


 33 g/dL


(31-37)


 


Red Cell Distribution Width


 


 


 


 15.6 %


(11.5-14.5)


 


Platelet Count


 


 


 


 688 x10^3/uL


(140-400)


 


Neutrophils (%) (Auto)    85 % (31-73) 


 


Lymphocytes (%) (Auto)    8 % (24-48) 


 


Monocytes (%) (Auto)    6 % (0-9) 


 


Eosinophils (%) (Auto)    2 % (0-3) 


 


Basophils (%) (Auto)    0 % (0-3) 


 


Neutrophils # (Auto)


 


 


 


 21.4 x10^3/uL


(1.8-7.7)


 


Lymphocytes # (Auto)


 


 


 


 1.9 x10^3/uL


(1.0-4.8)


 


Monocytes # (Auto)


 


 


 


 1.5 x10^3/uL


(0.0-1.1)


 


Eosinophils # (Auto)


 


 


 


 0.4 x10^3/uL


(0.0-0.7)


 


Basophils # (Auto)


 


 


 


 0.1 x10^3/uL


(0.0-0.2)


 


Sodium Level


 


 


 


 133 mmol/L


(136-145)


 


Potassium Level


 


 


 


 4.6 mmol/L


(3.5-5.1)


 


Chloride Level


 


 


 


 100 mmol/L


()


 


Carbon Dioxide Level


 


 


 


 31 mmol/L


(21-32)


 


Anion Gap    2 (6-14) 


 


Blood Urea Nitrogen


 


 


 


 13 mg/dL


(7-20)


 


Creatinine


 


 


 


 0.6 mg/dL


(0.6-1.0)


 


Estimated GFR


(Cockcroft-Gault) 


 


 


 106.3 





 


Glucose Level


 


 


 


 113 mg/dL


(70-99)


 


Calcium Level


 


 


 


 9.4 mg/dL


(8.5-10.1)








Microbiology


7/15/20 Blood Culture - Final, Complete


          NO GROWTH AFTER 5 DAYS


7/12/20 Gram Stain Evaluation - Final, Complete


          


7/12/20 Respiratory Culture - Final, Complete


          


7/12/20 Antimicrobic Susceptibility - Final, Complete


          


6/30/20 Gram Stain - Final, Complete


          


6/30/20 Aerobic and Anaerobic Culture - Final, Complete


          


6/30/20 Antimicrobic Susceptibility - Final, Complete


          


6/15/20 Gram Stain - Final, Complete


          


6/15/20 Aerobic and Anaerobic Culture - Final, Complete


          


6/7/20 Urine Culture - Final, Complete


         


5/30/20 Gram Stain - Final, Complete


          


5/30/20 Aerobic Culture - Final, Complete


Medications





Current Medications


Sodium Chloride 1,000 ml @  1,000 mls/hr Q1H IV  Last administered on 3/16/20at 

03:00;  Start 3/16/20 at 03:00;  Stop 3/16/20 at 03:59;  Status DC


Ondansetron HCl (Zofran) 4 mg 1X  ONCE IVP  Last administered on 3/16/20at 

03:27;  Start 3/16/20 at 03:00;  Stop 3/16/20 at 03:01;  Status DC


Morphine Sulfate (Morphine Sulfate) 4 mg 1X  ONCE IV ;  Start 3/16/20 at 03:00; 

Stop 3/16/20 at 03:01;  Status Cancel


Ketorolac Tromethamine (Toradol 30mg Vial) 30 mg 1X  ONCE IV  Last administered 

on 3/16/20at 02:54;  Start 3/16/20 at 03:00;  Stop 3/16/20 at 03:01;  Status DC


Fentanyl Citrate (Fentanyl 2ml Vial) 25 mcg 1X  ONCE IVP  Last administered on 

3/16/20at 03:23;  Start 3/16/20 at 03:30;  Stop 3/16/20 at 03:31;  Status DC


Fentanyl Citrate (Fentanyl 2ml Vial) 100 mcg STK-MED ONCE .ROUTE ;  Start 

3/16/20 at 03:18;  Stop 3/16/20 at 03:18;  Status DC


Iohexol (Omnipaque 350 Mg/ml) 90 ml 1X  ONCE IV  Last administered on 3/16/20at 

03:25;  Start 3/16/20 at 03:30;  Stop 3/16/20 at 03:31;  Status DC


Info (CONTRAST GIVEN -- Rx MONITORING) 1 each PRN DAILY  PRN MC SEE COMMENTS;  

Start 3/16/20 at 03:30;  Stop 3/18/20 at 03:29;  Status DC


Hydromorphone HCl (Dilaudid) 0.5 mg 1X  ONCE IV  Last administered on 3/16/20at 

03:55;  Start 3/16/20 at 04:30;  Stop 3/16/20 at 04:32;  Status DC


Ondansetron HCl (Zofran) 4 mg PRN Q8HRS  PRN IV NAUSEA/VOMITING 1ST CHOICE;  

Start 3/16/20 at 05:00;  Stop 3/16/20 at 09:27;  Status DC


Morphine Sulfate (Morphine Sulfate) 2 mg PRN Q2HR  PRN IV SEVERE PAIN 7-10 Last 

administered on 3/17/20at 12:26;  Start 3/16/20 at 05:00;  Stop 3/17/20 at 

14:15;  Status DC


Sodium Chloride 1,000 ml @  125 mls/hr Q8H IV  Last administered on 3/16/20at 

20:56;  Start 3/16/20 at 05:00;  Stop 3/17/20 at 04:59;  Status DC


Hydromorphone HCl (Dilaudid) 0.5 mg PRN Q3HRS  PRN IV SEVERE PAIN 7-10 Last 

administered on 3/17/20at 10:06;  Start 3/16/20 at 05:00;  Stop 3/17/20 at 

12:01;  Status DC


Piperacillin Sod/ Tazobactam Sod 4.5 gm/Sodium Chloride 100 ml @  200 mls/hr 1X 

ONCE IV  Last administered on 3/16/20at 05:44;  Start 3/16/20 at 06:00;  Stop 

3/16/20 at 06:29;  Status DC


Ondansetron HCl (Zofran) 4 mg PRN Q4HRS  PRN IV NAUSEA/VOMITING 1ST CHOICE Last 

administered on 7/20/20at 08:41;  Start 3/16/20 at 09:30


Insulin Human Lispro (HumaLOG) 0-9 UNITS Q6HRS SQ  Last administered on 

7/19/20at 12:33;  Start 3/16/20 at 09:30


Dextrose (Dextrose 50%-Water Syringe) 12.5 gm PRN Q15MIN  PRN IV SEE COMMENTS;  

Start 3/16/20 at 09:30


Pantoprazole Sodium (PROTONIX VIAL for IV PUSH) 40 mg DAILYAC IVP  Last 

administered on 7/20/20at 09:12;  Start 3/16/20 at 11:30


Prochlorperazine Edisylate (Compazine) 10 mg PRN Q6HRS  PRN IV NAUSEA/VOMITING, 

2nd CHOICE Last administered on 7/13/20at 20:17;  Start 3/16/20 at 17:45


Atenolol (Tenormin) 100 mg DAILY PO ;  Start 3/17/20 at 09:00;  Stop 3/16/20 at 

20:08;  Status DC


Metoprolol Tartrate (Lopressor Vial) 2.5 mg Q6HRS IVP  Last administered on 

3/17/20at 05:51;  Start 3/16/20 at 20:15;  Stop 3/17/20 at 10:02;  Status DC


Metoprolol Tartrate (Lopressor Vial) 5 mg Q6HRS IVP  Last administered on 

3/26/20at 00:12;  Start 3/17/20 at 10:15;  Stop 3/28/20 at 08:48;  Status DC


Hydromorphone HCl (Dilaudid) 1 mg PRN Q3HRS  PRN IV SEVERE PAIN 7-10 Last 

administered on 3/23/20at 05:13;  Start 3/17/20 at 12:00;  Stop 3/31/20 at 

00:25;  Status DC


Lidocaine HCl (Buffered Lidocaine 1%) 3 ml STK-MED ONCE .ROUTE ;  Start 3/17/20 

at 12:55;  Stop 3/17/20 at 12:56;  Status DC


Albumin Human 500 ml @  125 mls/hr 1X  ONCE IV  Last administered on 3/17/20at 

14:33;  Start 3/17/20 at 14:30;  Stop 3/17/20 at 18:32;  Status DC


Norepinephrine Bitartrate 8 mg/ Dextrose 258 ml @  17.299 mls/ hr CONT  PRN IV 

PER PROTOCOL Last administered on 4/14/20at 12:48;  Start 3/17/20 at 15:30;  

Stop 4/17/20 at 09:19;  Status DC


Sodium Chloride 1,000 ml @  125 mls/hr Q8H IV  Last administered on 3/17/20at 

21:04;  Start 3/17/20 at 16:00;  Stop 3/18/20 at 02:42;  Status DC


Albumin Human 500 ml @  125 mls/hr PRN BID  PRN IV After every 2L NSS & BP < 

90mm Last administered on 6/30/20at 16:06;  Start 3/17/20 at 16:00;  Stop 7/3/20

at 09:30;  Status DC


Iohexol (Omnipaque 300 Mg/ml) 60 ml 1X  ONCE IV  Last administered on 3/17/20at 

17:20;  Start 3/17/20 at 17:00;  Stop 3/17/20 at 17:01;  Status DC


Info (CONTRAST GIVEN -- Rx MONITORING) 1 each PRN DAILY  PRN MC SEE COMMENTS;  

Start 3/17/20 at 17:00;  Stop 3/19/20 at 16:59;  Status DC


Meropenem 1 gm/ Sodium Chloride 100 ml @  200 mls/hr Q8HRS IV  Last administered

on 3/18/20at 05:45;  Start 3/17/20 at 20:00;  Stop 3/18/20 at 08:48;  Status DC


Furosemide (Lasix) 40 mg 1X  ONCE IVP  Last administered on 3/17/20at 22:12;  

Start 3/17/20 at 22:30;  Stop 3/17/20 at 22:31;  Status DC


Calcium Chloride 1000 mg/Sodium Chloride 110 ml @  220 mls/hr 1X  ONCE IV  Last 

administered on 3/17/20at 22:11;  Start 3/17/20 at 22:30;  Stop 3/17/20 at 

22:59;  Status DC


Albuterol Sulfate (Ventolin Neb Soln) 2.5 mg 1X  ONCE NEB  Last administered on 

3/18/20at 00:56;  Start 3/17/20 at 22:30;  Stop 3/17/20 at 22:31;  Status DC


Insulin Human Regular (HumuLIN R VIAL) 5 unit 1X  ONCE IV  Last administered on 

3/17/20at 22:14;  Start 3/17/20 at 22:30;  Stop 3/17/20 at 22:31;  Status DC


Magnesium Sulfate 50 ml @ 25 mls/hr 1X  ONCE IV  Last administered on 3/18/20at 

02:57;  Start 3/18/20 at 03:00;  Stop 3/18/20 at 04:59;  Status DC


Calcium Gluconate 1000 mg/Sodium Chloride 110 ml @  220 mls/hr 1X  ONCE IV  Last

administered on 3/18/20at 02:46;  Start 3/18/20 at 03:00;  Stop 3/18/20 at 

03:29;  Status DC


Sodium Chloride 1,000 ml @  200 mls/hr Q5H IV  Last administered on 3/18/20at 

02:46;  Start 3/18/20 at 03:00;  Stop 3/18/20 at 10:21;  Status DC


Calcium Gluconate 1000 mg/Sodium Chloride 110 ml @  220 mls/hr 1X  ONCE IV  Last

administered on 3/18/20at 03:21;  Start 3/18/20 at 03:30;  Stop 3/18/20 at 

03:59;  Status DC


Sodium Bicarbonate 50 meq/Sodium Chloride 1,050 ml @  75 mls/hr Q14H IV  Last 

administered on 3/22/20at 21:10;  Start 3/18/20 at 07:30;  Stop 3/23/20 at 

10:28;  Status DC


Calcium Gluconate 2000 mg/Sodium Chloride 120 ml @  220 mls/hr 1X  ONCE IV  Last

administered on 3/18/20at 09:05;  Start 3/18/20 at 07:30;  Stop 3/18/20 at 

08:02;  Status DC


Lidocaine HCl (Xylocaine-Mpf 1% 2ml Vial) 2 ml STK-MED ONCE .ROUTE ;  Start 

3/18/20 at 08:47;  Stop 3/18/20 at 08:47;  Status DC


Meropenem 500 mg/ Sodium Chloride 50 ml @  100 mls/hr Q12HR IV  Last adm

inistered on 3/23/20at 21:01;  Start 3/18/20 at 18:00;  Stop 3/24/20 at 07:58;  

Status DC


Lidocaine HCl (Buffered Lidocaine 1%) 3 ml STK-MED ONCE .ROUTE ;  Start 3/18/20 

at 09:46;  Stop 3/18/20 at 09:46;  Status DC


Lidocaine HCl (Buffered Lidocaine 1%) 6 ml 1X  ONCE INJ  Last administered on 

3/18/20at 10:26;  Start 3/18/20 at 10:15;  Stop 3/18/20 at 10:16;  Status DC


Info (Tpn Per Pharmacy) 1 each PRN DAILY  PRN MC SEE COMMENTS Last administered 

on 7/19/20at 08:49;  Start 3/18/20 at 12:00


Sodium Chloride 1,000 ml @  1,000 mls/hr Q1H PRN IV hypotension;  Start 3/18/20 

at 12:07;  Stop 3/18/20 at 18:06;  Status DC


Diphenhydramine HCl (Benadryl) 25 mg 1X PRN  PRN IV ITCHING;  Start 3/18/20 at 

12:15;  Stop 3/19/20 at 12:14;  Status DC


Diphenhydramine HCl (Benadryl) 25 mg 1X PRN  PRN IV ITCHING;  Start 3/18/20 at 

12:15;  Stop 3/19/20 at 12:14;  Status DC


Sodium Chloride 1,000 ml @  400 mls/hr Q2H30M PRN IV PATENCY;  Start 3/18/20 at 

12:07;  Stop 3/19/20 at 00:06;  Status DC


Info (PHARMACY MONITORING -- do not chart) 1 each PRN DAILY  PRN MC SEE 

COMMENTS;  Start 3/18/20 at 12:15;  Stop 3/20/20 at 08:13;  Status DC


Sodium Chloride 90 meq/Calcium Gluconate 10 meq/ Multivitamins 10 ml/Chromium/ 

Copper/Manganese/ Seleni/Zn 1 ml/ Total Parenteral Nutrition/Amino Acids/Dex

trose/ Fat Emulsion Intravenous 55.005 ml  @ 2.292 mls/hr TPN  CONT IV ;  Start 

3/18/20 at 22:00;  Stop 3/18/20 at 12:33;  Status DC


Info (Tpn Per Pharmacy) 1 each PRN DAILY  PRN MC SEE COMMENTS;  Start 3/18/20 at

12:30;  Status UNV


Sodium Chloride 90 meq/Calcium Gluconate 10 meq/ Multivitamins 10 ml/Chromium/ 

Copper/Manganese/ Seleni/Zn 0.5 ml/ Total Parenteral Nutrition/Amino Aci

ds/Dextrose/ Fat Emulsion Intravenous 1,512 ml @  63 mls/hr TPN  CONT IV  Last 

administered on 3/18/20at 22:06;  Start 3/18/20 at 22:00;  Stop 3/19/20 at 

21:59;  Status DC


Calcium Carbonate/ Glycine (Tums) 500 mg PRN AFTMEALHC  PRN PO INDIGESTION;  

Start 3/18/20 at 17:45;  Stop 5/13/20 at 10:25;  Status DC


Calcium Gluconate (Calcium Gluconate) 2,000 mg 1X  ONCE IVP  Last administered 

on 3/19/20at 02:19;  Start 3/19/20 at 02:15;  Stop 3/19/20 at 02:16;  Status DC


Calcium Chloride 3000 mg/Sodium Chloride 1,030 ml @  50 mls/hr Q68M05P IV  Last 

administered on 3/21/20at 02:17;  Start 3/19/20 at 08:00;  Stop 3/21/20 at 

15:23;  Status DC


Lorazepam (Ativan Inj) 1 mg PRN Q4HRS  PRN IVP ANXIETY / AGITATION, 2nd choic 

Last administered on 4/17/20at 03:51;  Start 3/19/20 at 09:00;  Stop 4/17/20 at 

09:19;  Status DC


Sodium Chloride 1,000 ml @  1,000 mls/hr Q1H PRN IV hypotension;  Start 3/19/20 

at 08:56;  Stop 3/19/20 at 14:55;  Status DC


Albumin Human 200 ml @  200 mls/hr 1X PRN  PRN IV Hypotension;  Start 3/19/20 at

09:00;  Stop 3/19/20 at 14:59;  Status DC


Diphenhydramine HCl (Benadryl) 25 mg 1X PRN  PRN IV ITCHING;  Start 3/19/20 at 

09:00;  Stop 3/20/20 at 08:59;  Status DC


Diphenhydramine HCl (Benadryl) 25 mg 1X PRN  PRN IV ITCHING;  Start 3/19/20 at 

09:00;  Stop 3/20/20 at 08:59;  Status DC


Sodium Chloride 1,000 ml @  400 mls/hr Q2H30M PRN IV PATENCY;  Start 3/19/20 at 

08:56;  Stop 3/19/20 at 20:55;  Status DC


Info (PHARMACY MONITORING -- do not chart) 1 each PRN DAILY  PRN MC SEE 

COMMENTS;  Start 3/19/20 at 09:00;  Status UNV


Info (PHARMACY MONITORING -- do not chart) 1 each PRN DAILY  PRN MC SEE 

COMMENTS;  Start 3/19/20 at 09:00;  Stop 3/20/20 at 08:13;  Status DC


Digoxin (Lanoxin) 500 mcg 1X  ONCE IV  Last administered on 3/19/20at 10:04;  

Start 3/19/20 at 10:00;  Stop 3/19/20 at 10:01;  Status DC


Digoxin (Lanoxin) 125 mcg 1X  ONCE IV  Last administered on 3/19/20at 17:10;  

Start 3/19/20 at 18:00;  Stop 3/19/20 at 18:01;  Status DC


Magnesium Sulfate 100 ml @  25 mls/hr 1X  ONCE IV  Last administered on 

3/19/20at 12:48;  Start 3/19/20 at 13:00;  Stop 3/19/20 at 16:59;  Status DC


Sodium Chloride 90 meq/Magnesium Sulfate 10 meq/ Calcium Gluconate 20 meq/ 

Multivitamins 10 ml/Chromium/ Copper/Manganese/ Seleni/Zn 0.5 ml/ Total 

Parenteral Nutrition/Amino Acids/Dextrose/ Fat Emulsion Intravenous 1,512 ml @  

63 mls/hr TPN  CONT IV  Last administered on 3/19/20at 22:25;  Start 3/19/20 at 

22:00;  Stop 3/20/20 at 21:59;  Status DC


Sodium Chloride 1,000 ml @  1,000 mls/hr Q1H PRN IV hypotension;  Start 3/20/20 

at 08:05;  Stop 3/20/20 at 14:04;  Status DC


Albumin Human 200 ml @  200 mls/hr 1X  ONCE IV  Last administered on 3/20/20at 

08:57;  Start 3/20/20 at 08:15;  Stop 3/20/20 at 09:14;  Status DC


Diphenhydramine HCl (Benadryl) 25 mg 1X PRN  PRN IV ITCHING;  Start 3/20/20 at 

08:15;  Stop 3/21/20 at 08:14;  Status DC


Diphenhydramine HCl (Benadryl) 25 mg 1X PRN  PRN IV ITCHING;  Start 3/20/20 at 

08:15;  Stop 3/21/20 at 08:14;  Status DC


Sodium Chloride 1,000 ml @  400 mls/hr Q2H30M PRN IV PATENCY;  Start 3/20/20 at 

08:05;  Stop 3/20/20 at 20:04;  Status DC


Info (PHARMACY MONITORING -- do not chart) 1 each PRN DAILY  PRN MC SEE 

COMMENTS;  Start 3/20/20 at 08:15;  Stop 3/24/20 at 07:57;  Status DC


Sodium Chloride 90 meq/Potassium Chloride 15 meq/ Potassium Phosphate 10 mmol/ 

Magnesium Sulfate 10 meq/Calcium Gluconate 20 meq/ Multivitamins 10 ml/Chromium/

Copper/Manganese/ Seleni/Zn 0.5 ml/ Total Parenteral Nutrition/Amino 

Acids/Dextrose/ Fat Emulsion Intravenous 1,512 ml @  63 mls/hr TPN  CONT IV  

Last administered on 3/20/20at 21:01;  Start 3/20/20 at 22:00;  Stop 3/21/20 at 

21:59;  Status DC


Potassium Chloride/Water 100 ml @  100 mls/hr 1X  ONCE IV  Last administered on 

3/20/20at 14:09;  Start 3/20/20 at 14:00;  Stop 3/20/20 at 14:59;  Status DC


Benzocaine (Hurricaine One) 1 spray 1X  ONCE MM  Last administered on 3/20/20at 

16:38;  Start 3/20/20 at 14:30;  Stop 3/20/20 at 14:31;  Status DC


Lidocaine HCl (Glydo (Lidocaine) Jelly) 1 thomas 1X  ONCE MM  Last administered on 

3/20/20at 16:38;  Start 3/20/20 at 14:30;  Stop 3/20/20 at 14:31;  Status DC


Linezolid/Dextrose 300 ml @  300 mls/hr Q12HR IV  Last administered on 3/26/20at

21:04;  Start 3/20/20 at 20:00;  Stop 3/27/20 at 07:50;  Status DC


Acetaminophen (Tylenol) 650 mg PRN Q6HRS  PRN PO MILD PAIN / TEMP;  Start 

3/21/20 at 03:30;  Stop 3/21/20 at 03:36;  Status DC


Acetaminophen (Tylenol) 650 mg PRN Q6HRS  PRN PEG MILD PAIN / TEMP Last 

administered on 4/16/20at 19:56;  Start 3/21/20 at 03:36;  Stop 5/13/20 at 

10:25;  Status DC


Sodium Chloride 1,000 ml @  1,000 mls/hr Q1H PRN IV hypotension;  Start 3/21/20 

at 07:50;  Stop 3/21/20 at 13:49;  Status DC


Albumin Human 200 ml @  200 mls/hr 1X PRN  PRN IV Hypotension;  Start 3/21/20 at

08:00;  Stop 3/21/20 at 13:59;  Status DC


Sodium Chloride (Normal Saline Flush) 10 ml 1X PRN  PRN IV AP catheter pack;  

Start 3/21/20 at 08:00;  Stop 3/22/20 at 07:59;  Status DC


Sodium Chloride (Normal Saline Flush) 10 ml 1X PRN  PRN IV  catheter pack;  

Start 3/21/20 at 08:00;  Stop 3/22/20 at 07:59;  Status DC


Sodium Chloride 1,000 ml @  400 mls/hr Q2H30M PRN IV PATENCY;  Start 3/21/20 at 

07:50;  Stop 3/21/20 at 19:49;  Status DC


Info (PHARMACY MONITORING -- do not chart) 1 each PRN DAILY  PRN MC SEE 

COMMENTS;  Start 3/21/20 at 08:00;  Status UNV


Info (PHARMACY MONITORING -- do not chart) 1 each PRN DAILY  PRN MC SEE 

COMMENTS;  Start 3/21/20 at 08:00;  Stop 3/23/20 at 08:25;  Status DC


Sodium Chloride 90 meq/Potassium Chloride 15 meq/ Potassium Phosphate 10 mmol/ 

Magnesium Sulfate 10 meq/Calcium Gluconate 20 meq/ Multivitamins 10 ml/Chromium/

Copper/Manganese/ Seleni/Zn 0.5 ml/ Total Parenteral Nutrition/Amino 

Acids/Dextrose/ Fat Emulsion Intravenous 1,512 ml @  63 mls/hr TPN  CONT IV  

Last administered on 3/21/20at 20:57;  Start 3/21/20 at 22:00;  Stop 3/22/20 at 

21:59;  Status DC


Sodium Chloride 90 meq/Potassium Chloride 15 meq/ Potassium Phosphate 15 mmol/ 

Magnesium Sulfate 10 meq/Calcium Gluconate 20 meq/ Multivitamins 10 ml/Chromium/

Copper/Manganese/ Seleni/Zn 0.5 ml/ Total Parenteral Nutrition/Amino 

Acids/Dextrose/ Fat Emulsion Intravenous 1,512 ml @  63 mls/hr TPN  CONT IV ;  

Start 3/22/20 at 22:00;  Stop 3/22/20 at 14:16;  Status DC


Sodium Chloride 90 meq/Potassium Chloride 15 meq/ Potassium Phosphate 15 mmol/ 

Magnesium Sulfate 10 meq/Calcium Gluconate 20 meq/ Multivitamins 10 ml/Chromium/

Copper/Manganese/ Seleni/Zn 0.5 ml/ Total Parenteral Nutrition/Amino 

Acids/Dextrose/ Fat Emulsion Intravenous 1,200 ml @  50 mls/hr TPN  CONT IV ;  S

tart 3/22/20 at 22:00;  Stop 3/22/20 at 14:17;  Status DC


Sodium Chloride 90 meq/Potassium Chloride 15 meq/ Potassium Phosphate 10 mmol/ 

Magnesium Sulfate 10 meq/Calcium Gluconate 20 meq/ Multivitamins 10 ml/Chromium/

Copper/Manganese/ Seleni/Zn 0.5 ml/ Total Parenteral Nutrition/Amino 

Acids/Dextrose/ Fat Emulsion Intravenous 1,200 ml @  50 mls/hr TPN  CONT IV  

Last administered on 3/22/20at 23:29;  Start 3/22/20 at 22:00;  Stop 3/23/20 at 

21:59;  Status DC


Sodium Chloride 1,000 ml @  1,000 mls/hr Q1H PRN IV hypotension;  Start 3/23/20 

at 07:28;  Stop 3/23/20 at 13:27;  Status DC


Albumin Human 200 ml @  200 mls/hr 1X  ONCE IV  Last administered on 3/23/20at 

08:51;  Start 3/23/20 at 07:30;  Stop 3/23/20 at 08:29;  Status DC


Diphenhydramine HCl (Benadryl) 25 mg 1X PRN  PRN IV ITCHING;  Start 3/23/20 at 

07:30;  Stop 3/24/20 at 07:29;  Status DC


Diphenhydramine HCl (Benadryl) 25 mg 1X PRN  PRN IV ITCHING;  Start 3/23/20 at 

07:30;  Stop 3/24/20 at 07:29;  Status DC


Sodium Chloride 1,000 ml @  400 mls/hr Q2H30M PRN IV PATENCY;  Start 3/23/20 at 

07:28;  Stop 3/23/20 at 19:27;  Status DC


Info (PHARMACY MONITORING -- do not chart) 1 each PRN DAILY  PRN MC SEE 

COMMENTS;  Start 3/23/20 at 07:30;  Stop 4/3/20 at 13:01;  Status DC


Metronidazole 100 ml @  100 mls/hr Q6HRS IV  Last administered on 4/8/20at 

06:26;  Start 3/23/20 at 08:30;  Stop 4/8/20 at 09:58;  Status DC


Micafungin Sodium 100 mg/Dextrose 100 ml @  100 mls/hr Q24H IV  Last 

administered on 4/30/20at 08:18;  Start 3/23/20 at 09:00;  Stop 4/30/20 at 

20:58;  Status DC


Propofol 0 ml @ As Directed STK-MED ONCE IV ;  Start 3/23/20 at 07:53;  Stop 

3/23/20 at 07:53;  Status DC


Etomidate (Amidate) 20 mg STK-MED ONCE IV ;  Start 3/23/20 at 07:53;  Stop 

3/23/20 at 07:54;  Status DC


Midazolam HCl (Versed) 5 mg STK-MED ONCE .ROUTE ;  Start 3/23/20 at 07:57;  Stop

3/23/20 at 07:57;  Status DC


Fentanyl Citrate 30 ml @ 0 mls/hr CONT  PRN IV SEE PROTOCOL Last administered on

4/17/20at 06:12;  Start 3/23/20 at 08:15;  Stop 4/17/20 at 09:19;  Status DC


Artificial Tears (Artificial Tears) 1 drop PRN Q1HR  PRN OU DRY EYE, 1st choice;

 Start 3/23/20 at 08:15;  Stop 4/29/20 at 05:31;  Status DC


Midazolam HCl 50 mg/Sodium Chloride 50 ml @ 0 mls/hr CONT  PRN IV SEE PROTOCOL 

Last administered on 3/26/20at 22:39;  Start 3/23/20 at 08:15;  Stop 3/28/20 at 

15:59;  Status DC


Etomidate (Amidate) 8 mg 1X  ONCE IV  Last administered on 3/23/20at 08:33;  

Start 3/23/20 at 08:30;  Stop 3/23/20 at 08:31;  Status DC


Succinylcholine Chloride (Anectine) 120 mg 1X  ONCE IV  Last administered on 

3/23/20at 08:34;  Start 3/23/20 at 08:30;  Stop 3/23/20 at 08:31;  Status DC


Midazolam HCl (Versed) 5 mg 1X  ONCE IV ;  Start 3/23/20 at 08:30;  Stop 3/23/20

at 08:31;  Status DC


Potassium Chloride 15 meq/ Bicarbonate Dialysis Soln w/ out KCl 5,007.5 ml  @ 

1,000 mls/ hr Q5H1M IV  Last administered on 3/24/20at 11:11;  Start 3/23/20 at 

12:00;  Stop 3/24/20 at 11:15;  Status DC


Potassium Chloride 15 meq/ Bicarbonate Dialysis Soln w/ out KCl 5,007.5 ml  @ 

1,000 mls/ hr Q5H1M IV  Last administered on 3/24/20at 11:12;  Start 3/23/20 at 

12:00;  Stop 3/24/20 at 11:17;  Status DC


Potassium Chloride 15 meq/ Bicarbonate Dialysis Soln w/ out KCl 5,007.5 ml  @ 

1,000 mls/ hr Q5H1M IV  Last administered on 3/24/20at 11:11;  Start 3/23/20 at 

12:00;  Stop 3/24/20 at 11:19;  Status DC


Sodium Chloride 90 meq/Potassium Chloride 15 meq/ Potassium Phosphate 10 mmol/ 

Magnesium Sulfate 10 meq/Calcium Gluconate 20 meq/ Multivitamins 10 ml/Chromium/

Copper/Manganese/ Seleni/Zn 0.5 ml/ Total Parenteral Nutrition/Amino 

Acids/Dextrose/ Fat Emulsion Intravenous 1,400 ml @  58.333 mls/ hr TPN  CONT IV

 Last administered on 3/23/20at 21:42;  Start 3/23/20 at 22:00;  Stop 3/24/20 at

21:59;  Status DC


Heparin Sodium (Porcine) (Heparin Sodium) 5,000 unit Q8HRS SQ  Last administered

on 3/28/20at 05:55;  Start 3/23/20 at 15:00;  Stop 3/28/20 at 13:28;  Status DC


Meropenem 500 mg/ Sodium Chloride 50 ml @  100 mls/hr Q6HRS IV  Last 

administered on 3/25/20at 06:00;  Start 3/24/20 at 09:00;  Stop 3/25/20 at 

07:29;  Status DC


Potassium Phosphate 20 mmol/ Sodium Chloride 106.6667 ml @  51.667 m... 1X  ONCE

IV  Last administered on 3/24/20at 11:22;  Start 3/24/20 at 10:15;  Stop 3/24/20

at 12:18;  Status DC


Acetaminophen (Tylenol Supp) 650 mg PRN Q6HRS  PRN NV MILD PAIN / TEMP > 100.3'F

Last administered on 7/18/20at 21:21;  Start 3/24/20 at 10:30


Potassium Chloride/Water 100 ml @  100 mls/hr Q1H IV  Last administered on 

3/24/20at 12:12;  Start 3/24/20 at 11:00;  Stop 3/24/20 at 12:59;  Status DC


Potassium Chloride 20 meq/ Bicarbonate Dialysis Soln w/ out KCl 5,010 ml @  

1,000 mls/hr Q5H1M IV  Last administered on 3/25/20at 08:48;  Start 3/24/20 at 

12:00;  Stop 3/25/20 at 13:03;  Status DC


Potassium Chloride 20 meq/ Bicarbonate Dialysis Soln w/ out KCl 5,010 ml @  

1,000 mls/hr Q5H1M IV  Last administered on 3/29/20at 14:52;  Start 3/24/20 at 

11:30;  Stop 3/29/20 at 19:59;  Status DC


Potassium Chloride 20 meq/ Bicarbonate Dialysis Soln w/ out KCl 5,010 ml @  

1,000 mls/hr Q5H1M IV  Last administered on 3/29/20at 14:53;  Start 3/24/20 at 

11:30;  Stop 3/29/20 at 19:59;  Status DC


Sodium Chloride 90 meq/Potassium Chloride 15 meq/ Potassium Phosphate 15 mmol/ 

Magnesium Sulfate 10 meq/Calcium Gluconate 15 meq/ Multivitamins 10 ml/Chromium/

Copper/Manganese/ Seleni/Zn 0.5 ml/ Total Parenteral Nutrition/Amino 

Acids/Dextrose/ Fat Emulsion Intravenous 1,400 ml @  58.333 mls/ hr TPN  CONT IV

 Last administered on 3/24/20at 22:17;  Start 3/24/20 at 22:00;  Stop 3/25/20 at

21:59;  Status DC


Cefepime HCl (Maxipime) 2 gm Q12HR IVP  Last administered on 4/7/20at 20:56;  

Start 3/25/20 at 09:00;  Stop 4/8/20 at 09:58;  Status DC


Daptomycin 500 mg/ Sodium Chloride 50 ml @  100 mls/hr Q48H IV  Last 

administered on 4/10/20at 09:57;  Start 3/25/20 at 08:30;  Stop 4/10/20 at 

10:07;  Status DC


Lidocaine HCl (Buffered Lidocaine 1%) 3 ml 1X  ONCE INJ  Last administered on 

3/25/20at 10:27;  Start 3/25/20 at 10:30;  Stop 3/25/20 at 10:31;  Status DC


Potassium Phosphate 20 mmol/ Sodium Chloride 106.6667 ml @  51.667 m... 1X  ONCE

IV  Last administered on 3/25/20at 12:51;  Start 3/25/20 at 13:00;  Stop 3/25/20

at 15:03;  Status DC


Sodium Chloride 90 meq/Potassium Chloride 15 meq/ Potassium Phosphate 18 mmol/ 

Magnesium Sulfate 8 meq/Calcium Gluconate 15 meq/ Multivitamins 10 ml/Chromium/ 

Copper/Manganese/ Seleni/Zn 0.5 ml/ Total Parenteral Nutrition/Amino 

Acids/Dextrose/ Fat Emulsion Intravenous 1,400 ml @  58.333 mls/ hr TPN  CONT IV

 Last administered on 3/25/20at 22:16;  Start 3/25/20 at 22:00;  Stop 3/26/20 at

21:59;  Status DC


Potassium Chloride 20 meq/ Bicarbonate Dialysis Soln w/ out KCl 5,010 ml @  

1,000 mls/hr Q5H1M IV  Last administered on 3/29/20at 14:54;  Start 3/25/20 at 

16:00;  Stop 3/29/20 at 19:59;  Status DC


Multi-Ingred Cream/Lotion/Oil/ Oint (Artificial Tears Eye Ointment) 1 thomas PRN 

Q1HR  PRN OU DRY EYE, 2nd choice Last administered on 4/13/20at 08:19;  Start 

3/25/20 at 17:30;  Stop 6/3/20 at 14:39;  Status DC


Sodium Chloride 90 meq/Potassium Chloride 15 meq/ Potassium Phosphate 18 mmol/ 

Magnesium Sulfate 8 meq/Calcium Gluconate 15 meq/ Multivitamins 10 ml/Chromium/ 

Copper/Manganese/ Seleni/Zn 0.5 ml/ Total Parenteral Nutrition/Amino 

Acids/Dextrose/ Fat Emulsion Intravenous 1,400 ml @  58.333 mls/ hr TPN  CONT IV

 Last administered on 3/26/20at 22:00;  Start 3/26/20 at 22:00;  Stop 3/27/20 at

21:59;  Status DC


Albumin Human 500 ml @  125 mls/hr 1X  ONCE IV ;  Start 3/26/20 at 14:15;  Stop 

3/26/20 at 18:14;  Status DC


Sodium Chloride 90 meq/Potassium Chloride 15 meq/ Potassium Phosphate 18 mmol/ 

Magnesium Sulfate 8 meq/Calcium Gluconate 15 meq/ Multivitamins 10 ml/Chromium/ 

Copper/Manganese/ Seleni/Zn 0.5 ml/ Insulin Human Regular 10 unit/ Total 

Parenteral Nutrition/Amino Acids/Dextrose/ Fat Emulsion Intravenous 1,400 ml @  

58.333 mls/ hr TPN  CONT IV  Last administered on 3/27/20at 21:43;  Start 

3/27/20 at 22:00;  Stop 3/28/20 at 21:59;  Status DC


Lidocaine HCl (Buffered Lidocaine 1%) 3 ml STK-MED ONCE .ROUTE ;  Start 3/25/20 

at 10:00;  Stop 3/27/20 at 13:57;  Status DC


Midazolam HCl 100 mg/Sodium Chloride 100 ml @ 7 mls/hr CONT  PRN IV SEE PROTOCOL

Last administered on 4/8/20at 15:35;  Start 3/28/20 at 16:00;  Stop 6/3/20 at 

14:38;  Status DC


Sodium Chloride 90 meq/Potassium Chloride 15 meq/ Potassium Phosphate 18 mmol/ 

Magnesium Sulfate 8 meq/Calcium Gluconate 15 meq/ Multivitamins 10 ml/Chromium/ 

Copper/Manganese/ Seleni/Zn 0.5 ml/ Insulin Human Regular 15 unit/ Total 

Parenteral Nutrition/Amino Acids/Dextrose/ Fat Emulsion Intravenous 1,400 ml @  

58.333 mls/ hr TPN  CONT IV  Last administered on 3/28/20at 20:34;  Start 

3/28/20 at 22:00;  Stop 3/29/20 at 21:59;  Status DC


Info ([facility] Electrolyte Protocol) 1 ea CONT PRN  PRN MC PER PROTOCOL;  Start 

3/29/20 at 13:15


Sodium Chloride 90 meq/Potassium Chloride 15 meq/ Potassium Phosphate 18 mmol/ 

Magnesium Sulfate 8 meq/Calcium Gluconate 15 meq/ Multivitamins 10 ml/Chromium/ 

Copper/Manganese/ Seleni/Zn 0.5 ml/ Insulin Human Regular 15 unit/ Total 

Parenteral Nutrition/Amino Acids/Dextrose/ Fat Emulsion Intravenous 1,400 ml @  

58.333 mls/ hr TPN  CONT IV  Last administered on 3/29/20at 22:05;  Start 

3/29/20 at 22:00;  Stop 3/30/20 at 21:59;  Status DC


Potassium Chloride 15 meq/ Bicarbonate Dialysis Soln w/ out KCl 5,007.5 ml  @ 

1,000 mls/ hr Q5H1M IV  Last administered on 4/1/20at 18:14;  Start 3/29/20 at 

20:00;  Stop 4/2/20 at 13:08;  Status DC


Potassium Chloride 15 meq/ Bicarbonate Dialysis Soln w/ out KCl 5,007.5 ml  @ 

1,000 mls/ hr Q5H1M IV  Last administered on 4/1/20at 18:14;  Start 3/29/20 at 

20:00;  Stop 4/2/20 at 13:08;  Status DC


Potassium Chloride 15 meq/ Bicarbonate Dialysis Soln w/ out KCl 5,007.5 ml  @ 

1,000 mls/ hr Q5H1M IV  Last administered on 4/1/20at 18:14;  Start 3/29/20 at 

20:00;  Stop 4/2/20 at 13:08;  Status DC


Iohexol (Omnipaque 240 Mg/ml) 30 ml 1X  ONCE PO  Last administered on 3/30/20at 

11:30;  Start 3/30/20 at 11:30;  Stop 3/30/20 at 11:33;  Status DC


Info (CONTRAST GIVEN -- Rx MONITORING) 1 each PRN DAILY  PRN MC SEE COMMENTS;  

Start 3/30/20 at 11:45;  Stop 4/1/20 at 11:44;  Status DC


Sodium Chloride 90 meq/Potassium Chloride 15 meq/ Potassium Phosphate 18 mmol/ 

Magnesium Sulfate 8 meq/Calcium Gluconate 15 meq/ Multivitamins 10 ml/Chromium/ 

Copper/Manganese/ Seleni/Zn 0.5 ml/ Insulin Human Regular 15 unit/ Total 

Parenteral Nutrition/Amino Acids/Dextrose/ Fat Emulsion Intravenous 1,400 ml @  

58.333 mls/ hr TPN  CONT IV  Last administered on 3/30/20at 21:47;  Start 

3/30/20 at 22:00;  Stop 3/31/20 at 21:59;  Status DC


Sodium Chloride 90 meq/Potassium Chloride 15 meq/ Potassium Phosphate 18 mmol/ 

Magnesium Sulfate 8 meq/Calcium Gluconate 15 meq/ Multivitamins 10 ml/Chromium/ 

Copper/Manganese/ Seleni/Zn 0.5 ml/ Insulin Human Regular 20 unit/ Total 

Parenteral Nutrition/Amino Acids/Dextrose/ Fat Emulsion Intravenous 1,400 ml @  

58.333 mls/ hr TPN  CONT IV  Last administered on 3/31/20at 21:36;  Start 

3/31/20 at 22:00;  Stop 4/1/20 at 21:59;  Status DC


Alteplase, Recombinant (Cathflo For Central Catheter Clearance) 1 mg 1X  ONCE 

INT CAT  Last administered on 3/31/20at 20:03;  Start 3/31/20 at 19:30;  Stop 

3/31/20 at 19:46;  Status DC


Alteplase, Recombinant (Cathflo For Central Catheter Clearance) 1 mg 1X  ONCE 

INT CAT  Last administered on 3/31/20at 22:05;  Start 3/31/20 at 22:00;  Stop 

3/31/20 at 22:01;  Status DC


Sodium Chloride 90 meq/Potassium Chloride 15 meq/ Potassium Phosphate 18 mmol/ 

Magnesium Sulfate 8 meq/Calcium Gluconate 15 meq/ Multivitamins 10 ml/Chromium/ 

Copper/Manganese/ Seleni/Zn 0.5 ml/ Insulin Human Regular 20 unit/ Total 

Parenteral Nutrition/Amino Acids/Dextrose/ Fat Emulsion Intravenous 1,400 ml @  

58.333 mls/ hr TPN  CONT IV  Last administered on 4/1/20at 21:30;  Start 4/1/20 

at 22:00;  Stop 4/2/20 at 21:59;  Status DC


Dexmedetomidine HCl 400 mcg/ Sodium Chloride 100 ml @ 0 mls/hr CONT  PRN IV 

ANXIETY / AGITATION Last administered on 5/30/20at 12:57;  Start 4/2/20 at 

08:15;  Stop 5/30/20 at 18:31;  Status DC


Sodium Chloride 500 ml @  500 mls/hr 1X PRN  PRN IV ELEVATED BP, SEE COMMENTS;  

Start 4/2/20 at 08:15


Atropine Sulfate (ATROPINE 0.5mg SYRINGE) 0.5 mg PRN Q5MIN  PRN IV SEE COMMENTS;

 Start 4/2/20 at 08:15


Furosemide (Lasix) 20 mg 1X  ONCE IVP  Last administered on 4/2/20at 08:19;  

Start 4/2/20 at 08:15;  Stop 4/2/20 at 08:16;  Status DC


Lidocaine HCl (Buffered Lidocaine 1%) 3 ml STK-MED ONCE .ROUTE ;  Start 4/2/20 

at 08:39;  Stop 4/2/20 at 08:39;  Status DC


Lidocaine HCl (Buffered Lidocaine 1%) 6 ml 1X  ONCE INJ  Last administered on 

4/2/20at 09:05;  Start 4/2/20 at 09:00;  Stop 4/2/20 at 09:06;  Status DC


Sodium Chloride 90 meq/Potassium Chloride 15 meq/ Potassium Phosphate 18 mmol/ 

Magnesium Sulfate 8 meq/Calcium Gluconate 15 meq/ Multivitamins 10 ml/Chromium/ 

Copper/Manganese/ Seleni/Zn 0.5 ml/ Insulin Human Regular 20 unit/ Total 

Parenteral Nutrition/Amino Acids/Dextrose/ Fat Emulsion Intravenous 1,400 ml @  

58.333 mls/ hr TPN  CONT IV  Last administered on 4/2/20at 22:45;  Start 4/2/20 

at 22:00;  Stop 4/3/20 at 21:59;  Status DC


Sodium Chloride 1,000 ml @  1,000 mls/hr Q1H PRN IV hypotension;  Start 4/3/20 

at 07:30;  Stop 4/3/20 at 13:29;  Status DC


Albumin Human 200 ml @  200 mls/hr 1X PRN  PRN IV Hypotension Last administered 

on 4/3/20at 09:36;  Start 4/3/20 at 07:30;  Stop 4/3/20 at 13:29;  Status DC


Sodium Chloride (Normal Saline Flush) 10 ml 1X PRN  PRN IV AP catheter pack;  

Start 4/3/20 at 07:30;  Stop 4/3/20 at 21:29;  Status DC


Sodium Chloride (Normal Saline Flush) 10 ml 1X PRN  PRN IV  catheter pack;  

Start 4/3/20 at 07:30;  Stop 4/4/20 at 07:29;  Status DC


Sodium Chloride 1,000 ml @  400 mls/hr Q2H30M PRN IV PATENCY;  Start 4/3/20 at 

07:30;  Stop 4/3/20 at 19:29;  Status DC


Info (PHARMACY MONITORING -- do not chart) 1 each PRN DAILY  PRN MC SEE 

COMMENTS;  Start 4/3/20 at 07:30;  Stop 4/3/20 at 13:02;  Status DC


Info (PHARMACY MONITORING -- do not chart) 1 each PRN DAILY  PRN MC SEE 

COMMENTS;  Start 4/3/20 at 07:30;  Stop 4/5/20 at 12:45;  Status DC


Sodium Chloride 90 meq/Potassium Chloride 15 meq/ Potassium Phosphate 10 mmol/ 

Magnesium Sulfate 8 meq/Calcium Gluconate 15 meq/ Multivitamins 10 ml/Chromium/ 

Copper/Manganese/ Seleni/Zn 0.5 ml/ Insulin Human Regular 25 unit/ Total Paren

teral Nutrition/Amino Acids/Dextrose/ Fat Emulsion Intravenous 1,400 ml @  

58.333 mls/ hr TPN  CONT IV  Last administered on 4/3/20at 22:19;  Start 4/3/20 

at 22:00;  Stop 4/4/20 at 21:59;  Status DC


Heparin Sodium (Porcine) (Heparin Sodium) 5,000 unit Q12HR SQ  Last administered

on 4/26/20at 08:59;  Start 4/3/20 at 21:00;  Stop 4/26/20 at 10:05;  Status DC


Ondansetron HCl (Zofran) 4 mg PRN Q6HRS  PRN IV NAUSEA/VOMITING;  Start 4/6/20 

at 07:00;  Stop 4/7/20 at 06:59;  Status DC


Fentanyl Citrate (Fentanyl 2ml Vial) 25 mcg PRN Q5MIN  PRN IV MILD PAIN 1-3;  

Start 4/6/20 at 07:00;  Stop 4/7/20 at 06:59;  Status DC


Fentanyl Citrate (Fentanyl 2ml Vial) 50 mcg PRN Q5MIN  PRN IV MODERATE TO SEVERE

PAIN;  Start 4/6/20 at 07:00;  Stop 4/7/20 at 06:59;  Status DC


Ringer's Solution 1,000 ml @  30 mls/hr Q24H IV ;  Start 4/6/20 at 07:00;  Stop 

4/6/20 at 18:59;  Status DC


Lidocaine HCl (Xylocaine-Mpf 1% 2ml Vial) 2 ml PRN 1X  PRN ID PRIOR TO IV START;

 Start 4/6/20 at 07:00;  Stop 4/7/20 at 06:59;  Status DC


Prochlorperazine Edisylate (Compazine) 5 mg PACU PRN  PRN IV NAUSEA, MRX1;  

Start 4/6/20 at 07:00;  Stop 4/7/20 at 06:59;  Status DC


Sodium Chloride 1,000 ml @  1,000 mls/hr Q1H PRN IV hypotension;  Start 4/4/20 

at 09:10;  Stop 4/4/20 at 15:09;  Status DC


Albumin Human 200 ml @  200 mls/hr 1X PRN  PRN IV Hypotension Last administered 

on 4/4/20at 10:10;  Start 4/4/20 at 09:15;  Stop 4/4/20 at 15:14;  Status DC


Sodium Chloride 1,000 ml @  400 mls/hr Q2H30M PRN IV PATENCY;  Start 4/4/20 at 

09:10;  Stop 4/4/20 at 21:09;  Status DC


Info (PHARMACY MONITORING -- do not chart) 1 each PRN DAILY  PRN MC SEE 

COMMENTS;  Start 4/4/20 at 09:15;  Stop 4/5/20 at 12:45;  Status DC


Info (PHARMACY MONITORING -- do not chart) 1 each PRN DAILY  PRN MC SEE 

COMMENTS;  Start 4/4/20 at 09:15;  Stop 4/5/20 at 12:45;  Status DC


Sodium Chloride 90 meq/Potassium Chloride 15 meq/ Potassium Phosphate 10 mmol/ 

Magnesium Sulfate 8 meq/Calcium Gluconate 15 meq/ Multivitamins 10 ml/Chromium/ 

Copper/Manganese/ Seleni/Zn 0.5 ml/ Insulin Human Regular 25 unit/ Total 

Parenteral Nutrition/Amino Acids/Dextrose/ Fat Emulsion Intravenous 1,400 ml @  

58.333 mls/ hr TPN  CONT IV  Last administered on 4/4/20at 22:10;  Start 4/4/20 

at 22:00;  Stop 4/5/20 at 21:59;  Status DC


Magnesium Sulfate 50 ml @ 25 mls/hr PRN DAILY  PRN IV for Mag < 1.7 on am labs 

Last administered on 6/18/20at 10:57;  Start 4/5/20 at 09:15


Sodium Chloride 90 meq/Potassium Chloride 15 meq/ Potassium Phosphate 10 mmol/ 

Magnesium Sulfate 8 meq/Calcium Gluconate 15 meq/ Multivitamins 10 ml/Chromium/ 

Copper/Manganese/ Seleni/Zn 0.5 ml/ Insulin Human Regular 25 unit/ Total 

Parenteral Nutrition/Amino Acids/Dextrose/ Fat Emulsion Intravenous 1,400 ml @  

58.333 mls/ hr TPN  CONT IV  Last administered on 4/5/20at 21:20;  Start 4/5/20 

at 22:00;  Stop 4/6/20 at 21:59;  Status DC


Sodium Chloride 1,000 ml @  1,000 mls/hr Q1H PRN IV hypotension;  Start 4/5/20 

at 12:23;  Stop 4/5/20 at 18:22;  Status DC


Albumin Human 200 ml @  200 mls/hr 1X  ONCE IV  Last administered on 4/5/20at 

13:34;  Start 4/5/20 at 12:30;  Stop 4/5/20 at 13:29;  Status DC


Diphenhydramine HCl (Benadryl) 25 mg 1X PRN  PRN IV ITCHING;  Start 4/5/20 at 

12:30;  Stop 4/6/20 at 12:29;  Status DC


Diphenhydramine HCl (Benadryl) 25 mg 1X PRN  PRN IV ITCHING;  Start 4/5/20 at 

12:30;  Stop 4/6/20 at 12:29;  Status DC


Info (PHARMACY MONITORING -- do not chart) 1 each PRN DAILY  PRN MC SEE 

COMMENTS;  Start 4/5/20 at 12:30;  Status Cancel


Bupivacaine HCl/ Epinephrine Bitart (Sensorcain-Epi 0.5%-1:252316 Mpf) 30 ml 

STK-MED ONCE .ROUTE  Last administered on 4/6/20at 11:44;  Start 4/6/20 at 

11:00;  Stop 4/6/20 at 11:01;  Status DC


Cellulose (Surgicel Fibrillar 1x2) 1 each STK-MED ONCE .ROUTE ;  Start 4/6/20 at

11:00;  Stop 4/6/20 at 11:01;  Status DC


Sodium Chloride 90 meq/Potassium Chloride 15 meq/ Potassium Phosphate 10 mmol/ 

Magnesium Sulfate 12 meq/Calcium Gluconate 15 meq/ Multivitamins 10 ml/Chromium/

Copper/Manganese/ Seleni/Zn 0.5 ml/ Insulin Human Regular 25 unit/ Total 

Parenteral Nutrition/Amino Acids/Dextrose/ Fat Emulsion Intravenous 1,400 ml @  

58.333 mls/ hr TPN  CONT IV  Last administered on 4/6/20at 22:24;  Start 4/6/20 

at 22:00;  Stop 4/7/20 at 21:59;  Status DC


Propofol 20 ml @ As Directed STK-MED ONCE IV ;  Start 4/6/20 at 11:07;  Stop 

4/6/20 at 11:07;  Status DC


Cellulose (Surgicel Hemostat 4x8) 1 each STK-MED ONCE .ROUTE  Last administered 

on 4/6/20at 11:44;  Start 4/6/20 at 11:55;  Stop 4/6/20 at 11:56;  Status DC


Sevoflurane (Ultane) 60 ml STK-MED ONCE IH ;  Start 4/6/20 at 12:46;  Stop 

4/6/20 at 12:46;  Status DC


Sodium Chloride 1,000 ml @  1,000 mls/hr Q1H PRN IV hypotension;  Start 4/6/20 

at 13:51;  Stop 4/6/20 at 19:50;  Status DC


Albumin Human 200 ml @  200 mls/hr 1X PRN  PRN IV Hypotension Last administered 

on 4/6/20at 14:51;  Start 4/6/20 at 14:00;  Stop 4/6/20 at 19:59;  Status DC


Diphenhydramine HCl (Benadryl) 25 mg 1X PRN  PRN IV ITCHING;  Start 4/6/20 at 

14:00;  Stop 4/7/20 at 13:59;  Status DC


Diphenhydramine HCl (Benadryl) 25 mg 1X PRN  PRN IV ITCHING;  Start 4/6/20 at 

14:00;  Stop 4/7/20 at 13:59;  Status DC


Sodium Chloride 1,000 ml @  400 mls/hr Q2H30M PRN IV PATENCY;  Start 4/6/20 at 

13:51;  Stop 4/7/20 at 01:50;  Status DC


Info (PHARMACY MONITORING -- do not chart) 1 each PRN DAILY  PRN MC SEE 

COMMENTS;  Start 4/6/20 at 14:00;  Stop 4/9/20 at 08:16;  Status DC


Heparin Sodium (Porcine) (Hep Lock Adult) 500 unit STK-MED ONCE IVP ;  Start 

4/7/20 at 09:29;  Stop 4/7/20 at 09:30;  Status DC


Sodium Chloride 1,000 ml @  1,000 mls/hr Q1H PRN IV hypotension;  Start 4/7/20 

at 10:43;  Stop 4/7/20 at 16:42;  Status DC


Sodium Chloride 1,000 ml @  400 mls/hr Q2H30M PRN IV PATENCY;  Start 4/7/20 at 

10:43;  Stop 4/7/20 at 22:42;  Status DC


Info (PHARMACY MONITORING -- do not chart) 1 each PRN DAILY  PRN MC SEE 

COMMENTS;  Start 4/7/20 at 10:45;  Status UNV


Info (PHARMACY MONITORING -- do not chart) 1 each PRN DAILY  PRN MC SEE 

COMMENTS;  Start 4/7/20 at 10:45;  Status UNV


Sodium Chloride 90 meq/Potassium Chloride 15 meq/ Magnesium Sulfate 12 

meq/Calcium Gluconate 15 meq/ Multivitamins 10 ml/Chromium/ Copper/Manganese/ 

Seleni/Zn 0.5 ml/ Insulin Human Regular 25 unit/ Total Parenteral 

Nutrition/Amino Acids/Dextrose/ Fat Emulsion Intravenous 1,400 ml @  58.333 mls/

hr TPN  CONT IV  Last administered on 4/7/20at 22:13;  Start 4/7/20 at 22:00;  

Stop 4/8/20 at 21:59;  Status DC


Sodium Chloride 1,000 ml @  1,000 mls/hr Q1H PRN IV hypotension;  Start 4/8/20 

at 07:50;  Stop 4/8/20 at 13:49;  Status DC


Albumin Human 200 ml @  200 mls/hr 1X  ONCE IV ;  Start 4/8/20 at 08:00;  Stop 

4/8/20 at 08:53;  Status DC


Diphenhydramine HCl (Benadryl) 25 mg 1X PRN  PRN IV ITCHING;  Start 4/8/20 at 

08:00;  Stop 4/9/20 at 07:59;  Status DC


Diphenhydramine HCl (Benadryl) 25 mg 1X PRN  PRN IV ITCHING;  Start 4/8/20 at 

08:00;  Stop 4/9/20 at 07:59;  Status DC


Info (PHARMACY MONITORING -- do not chart) 1 each PRN DAILY  PRN MC SEE COM

MENTS;  Start 4/8/20 at 08:00;  Stop 4/9/20 at 08:16;  Status DC


Albumin Human 50 ml @ 50 mls/hr 1X  ONCE IV ;  Start 4/8/20 at 08:53;  Stop 

4/8/20 at 08:56;  Status DC


Albumin Human 200 ml @  50 mls/hr PRN 1X  PRN IV HYPOTENSION Last administered 

on 4/14/20at 11:54;  Start 4/8/20 at 09:00;  Stop 5/21/20 at 11:14;  Status DC


Meropenem 500 mg/ Sodium Chloride 50 ml @  100 mls/hr Q12H IV  Last administered

on 4/28/20at 10:45;  Start 4/8/20 at 10:00;  Stop 4/28/20 at 12:37;  Status DC


Sodium Chloride 90 meq/Magnesium Sulfate 12 meq/ Calcium Gluconate 15 meq/ 

Multivitamins 10 ml/Chromium/ Copper/Manganese/ Seleni/Zn 0.5 ml/ Insulin Human 

Regular 25 unit/ Total Parenteral Nutrition/Amino Acids/Dextrose/ Fat Emulsion 

Intravenous 1,400 ml @  58.333 mls/ hr TPN  CONT IV  Last administered on 

4/8/20at 21:41;  Start 4/8/20 at 22:00;  Stop 4/9/20 at 21:59;  Status DC


Sodium Chloride 1,000 ml @  1,000 mls/hr Q1H PRN IV hypotension;  Start 4/9/20 

at 07:58;  Stop 4/9/20 at 13:57;  Status DC


Albumin Human 200 ml @  200 mls/hr 1X PRN  PRN IV Hypotension Last administered 

on 4/9/20at 09:30;  Start 4/9/20 at 08:00;  Stop 4/9/20 at 13:59;  Status DC


Sodium Chloride 1,000 ml @  400 mls/hr Q2H30M PRN IV PATENCY;  Start 4/9/20 at 

07:58;  Stop 4/9/20 at 19:57;  Status DC


Info (PHARMACY MONITORING -- do not chart) 1 each PRN DAILY  PRN MC SEE 

COMMENTS;  Start 4/9/20 at 08:00;  Status Cancel


Info (PHARMACY MONITORING -- do not chart) 1 each PRN DAILY  PRN MC SEE 

COMMENTS;  Start 4/9/20 at 08:15;  Status UNV


Sodium Chloride 90 meq/Potassium Phosphate 5 mmol/ Magnesium Sulfate 12 

meq/Calcium Gluconate 15 meq/ Multivitamins 10 ml/Chromium/ Copper/Manganese/ 

Seleni/Zn 0.5 ml/ Insulin Human Regular 30 unit/ Total Parenteral 

Nutrition/Amino Acids/Dextrose/ Fat Emulsion Intravenous 1,400 ml @  58.333 mls/

hr TPN  CONT IV  Last administered on 4/9/20at 22:08;  Start 4/9/20 at 22:00;  

Stop 4/10/20 at 21:59;  Status DC


Linezolid/Dextrose 300 ml @  300 mls/hr Q12HR IV  Last administered on 4/20/20at

20:40;  Start 4/10/20 at 11:00;  Stop 4/21/20 at 08:10;  Status DC


Sodium Chloride 90 meq/Potassium Phosphate 15 mmol/ Magnesium Sulfate 12 

meq/Calcium Gluconate 15 meq/ Multivitamins 10 ml/Chromium/ Copper/Manganese/ 

Seleni/Zn 0.5 ml/ Insulin Human Regular 30 unit/ Total Parenteral 

Nutrition/Amino Acids/Dextrose/ Fat Emulsion Intravenous 1,400 ml @  58.333 mls/

hr TPN  CONT IV  Last administered on 4/10/20at 21:49;  Start 4/10/20 at 22:00; 

Stop 4/11/20 at 21:59;  Status DC


Sodium Chloride 90 meq/Potassium Phosphate 15 mmol/ Magnesium Sulfate 12 

meq/Calcium Gluconate 15 meq/ Multivitamins 10 ml/Chromium/ Copper/Manganese/ 

Seleni/Zn 0.5 ml/ Insulin Human Regular 40 unit/ Total Parenteral 

Nutrition/Amino Acids/Dextrose/ Fat Emulsion Intravenous 1,400 ml @  58.333 mls/

hr TPN  CONT IV  Last administered on 4/11/20at 21:21;  Start 4/11/20 at 22:00; 

Stop 4/12/20 at 21:59;  Status DC


Sodium Chloride 1,000 ml @  1,000 mls/hr Q1H PRN IV hypotension;  Start 4/11/20 

at 13:26;  Stop 4/11/20 at 19:25;  Status DC


Albumin Human 200 ml @  200 mls/hr 1X PRN  PRN IV Hypotension Last administered 

on 4/11/20at 15:00;  Start 4/11/20 at 13:30;  Stop 4/11/20 at 19:29;  Status DC


Sodium Chloride (Normal Saline Flush) 10 ml 1X PRN  PRN IV AP catheter pack;  

Start 4/11/20 at 13:30;  Stop 4/12/20 at 13:29;  Status DC


Sodium Chloride (Normal Saline Flush) 10 ml 1X PRN  PRN IV  catheter pack;  

Start 4/11/20 at 13:30;  Stop 4/12/20 at 13:29;  Status DC


Sodium Chloride 1,000 ml @  400 mls/hr Q2H30M PRN IV PATENCY;  Start 4/11/20 at 

13:26;  Stop 4/12/20 at 01:25;  Status DC


Info (PHARMACY MONITORING -- do not chart) 1 each PRN DAILY  PRN MC SEE 

COMMENTS;  Start 4/11/20 at 13:30;  Stop 4/11/20 at 13:33;  Status DC


Info (PHARMACY MONITORING -- do not chart) 1 each PRN DAILY  PRN MC SEE 

COMMENTS;  Start 4/11/20 at 13:30;  Stop 4/11/20 at 13:34;  Status DC


Sodium Chloride 90 meq/Potassium Phosphate 19 mmol/ Magnesium Sulfate 12 

meq/Calcium Gluconate 15 meq/ Multivitamins 10 ml/Chromium/ Copper/Manganese/ 

Seleni/Zn 0.5 ml/ Insulin Human Regular 40 unit/ Total Parenteral 

Nutrition/Amino Acids/Dextrose/ Fat Emulsion Intravenous 1,400 ml @  58.333 mls/

hr TPN  CONT IV  Last administered on 4/12/20at 21:54;  Start 4/12/20 at 22:00; 

Stop 4/13/20 at 21:59;  Status DC


Sodium Chloride 1,000 ml @  1,000 mls/hr Q1H PRN IV hypotension;  Start 4/13/20 

at 09:35;  Stop 4/13/20 at 15:34;  Status DC


Albumin Human 200 ml @  200 mls/hr 1X PRN  PRN IV Hypotension;  Start 4/13/20 at

09:45;  Stop 4/13/20 at 15:44;  Status DC


Diphenhydramine HCl (Benadryl) 25 mg 1X PRN  PRN IV ITCHING;  Start 4/13/20 at 

09:45;  Stop 4/14/20 at 09:44;  Status DC


Diphenhydramine HCl (Benadryl) 25 mg 1X PRN  PRN IV ITCHING;  Start 4/13/20 at 

09:45;  Stop 4/14/20 at 09:44;  Status DC


Sodium Chloride 1,000 ml @  400 mls/hr Q2H30M PRN IV PATENCY;  Start 4/13/20 at 

09:35;  Stop 4/13/20 at 21:34;  Status DC


Info (PHARMACY MONITORING -- do not chart) 1 each PRN DAILY  PRN MC SEE 

COMMENTS;  Start 4/13/20 at 09:45;  Status Cancel


Sodium Chloride 100 meq/Potassium Phosphate 19 mmol/ Magnesium Sulfate 12 

meq/Calcium Gluconate 15 meq/ Multivitamins 10 ml/Chromium/ Copper/Manganese/ 

Seleni/Zn 0.5 ml/ Insulin Human Regular 40 unit/ Potassium Chloride 20 meq/ 

Total Parenteral Nutrition/Amino Acids/Dextrose/ Fat Emulsion Intravenous 1,400 

ml @  58.333 mls/ hr TPN  CONT IV  Last administered on 4/13/20at 22:02;  Start 

4/13/20 at 22:00;  Stop 4/14/20 at 21:59;  Status DC


Furosemide (Lasix) 40 mg 1X  ONCE IVP  Last administered on 4/13/20at 14:39;  

Start 4/13/20 at 14:30;  Stop 4/13/20 at 14:31;  Status DC


Metronidazole 100 ml @  100 mls/hr Q8HRS IV  Last administered on 4/21/20at 

06:04;  Start 4/14/20 at 10:00;  Stop 4/21/20 at 08:10;  Status DC


Sodium Chloride 1,000 ml @  1,000 mls/hr Q1H PRN IV hypotension;  Start 4/14/20 

at 08:00;  Stop 4/14/20 at 13:59;  Status DC


Albumin Human 200 ml @  200 mls/hr 1X PRN  PRN IV Hypotension;  Start 4/14/20 at

08:00;  Stop 4/14/20 at 13:59;  Status DC


Sodium Chloride 1,000 ml @  400 mls/hr Q2H30M PRN IV PATENCY;  Start 4/14/20 at 

08:00;  Stop 4/14/20 at 19:59;  Status DC


Info (PHARMACY MONITORING -- do not chart) 1 each PRN DAILY  PRN MC SEE 

COMMENTS;  Start 4/14/20 at 11:30;  Status UNV


Info (PHARMACY MONITORING -- do not chart) 1 each PRN DAILY  PRN MC SEE 

COMMENTS;  Start 4/14/20 at 11:30;  Stop 4/16/20 at 12:13;  Status DC


Sodium Chloride 100 meq/Potassium Phosphate 19 mmol/ Magnesium Sulfate 12 

meq/Calcium Gluconate 15 meq/ Multivitamins 10 ml/Chromium/ Copper/Manganese/ 

Seleni/Zn 0.5 ml/ Insulin Human Regular 40 unit/ Potassium Chloride 20 meq/ 

Total Parenteral Nutrition/Amino Acids/Dextrose/ Fat Emulsion Intravenous 1,400 

ml @  58.333 mls/ hr TPN  CONT IV  Last administered on 4/14/20at 21:52;  Start 

4/14/20 at 22:00;  Stop 4/15/20 at 21:59;  Status DC


Sodium Chloride (Normal Saline Flush) 10 ml QSHIFT  PRN IV AFTER MEDS AND BLOOD 

DRAWS;  Start 4/14/20 at 15:00;  Stop 5/12/20 at 11:27;  Status DC


Sodium Chloride (Normal Saline Flush) 10 ml PRN Q5MIN  PRN IV AFTER MEDS AND 

BLOOD DRAWS;  Start 4/14/20 at 15:00


Sodium Chloride (Normal Saline Flush) 20 ml PRN Q5MIN  PRN IV AFTER MEDS AND 

BLOOD DRAWS;  Start 4/14/20 at 15:00


Sodium Chloride 100 meq/Potassium Phosphate 19 mmol/ Magnesium Sulfate 12 

meq/Calcium Gluconate 15 meq/ Multivitamins 10 ml/Chromium/ Copper/Manganese/ 

Seleni/Zn 0.5 ml/ Insulin Human Regular 40 unit/ Potassium Chloride 20 meq/ 

Total Parenteral Nutrition/Amino Acids/Dextrose/ Fat Emulsion Intravenous 1,400 

ml @  58.333 mls/ hr TPN  CONT IV  Last administered on 4/15/20at 21:20;  Start 

4/15/20 at 22:00;  Stop 4/16/20 at 21:59;  Status DC


Lidocaine HCl (Buffered Lidocaine 1%) 3 ml STK-MED ONCE .ROUTE ;  Start 4/15/20 

at 13:16;  Stop 4/15/20 at 13:16;  Status DC


Lidocaine HCl (Buffered Lidocaine 1%) 6 ml 1X  ONCE INJ  Last administered on 

4/15/20at 13:45;  Start 4/15/20 at 13:30;  Stop 4/15/20 at 13:31;  Status DC


Albumin Human 100 ml @  100 mls/hr 1X  ONCE IV  Last administered on 4/15/20at 

15:41;  Start 4/15/20 at 15:00;  Stop 4/15/20 at 15:59;  Status DC


Albumin Human 50 ml @ 50 mls/hr 1X  ONCE IV  Last administered on 4/15/20at 

15:00;  Start 4/15/20 at 15:00;  Stop 4/15/20 at 15:59;  Status DC


Info (PHARMACY MONITORING -- do not chart) 1 each PRN DAILY  PRN MC SEE 

COMMENTS;  Start 4/16/20 at 11:30;  Status Cancel


Info (PHARMACY MONITORING -- do not chart) 1 each PRN DAILY  PRN MC SEE 

COMMENTS;  Start 4/16/20 at 11:30;  Status UNV


Sodium Chloride 100 meq/Potassium Phosphate 10 mmol/ Magnesium Sulfate 12 

meq/Calcium Gluconate 15 meq/ Multivitamins 10 ml/Chromium/ Copper/Manganese/ S

haley/Zn 0.5 ml/ Insulin Human Regular 35 unit/ Potassium Chloride 20 meq/ Total

Parenteral Nutrition/Amino Acids/Dextrose/ Fat Emulsion Intravenous 1,400 ml @  

58.333 mls/ hr TPN  CONT IV  Last administered on 4/16/20at 22:10;  Start 

4/16/20 at 22:00;  Stop 4/17/20 at 21:59;  Status DC


Sodium Chloride 100 meq/Potassium Phosphate 5 mmol/ Magnesium Sulfate 12 

meq/Calcium Gluconate 15 meq/ Multivitamins 10 ml/Chromium/ Copper/Manganese/ 

Seleni/Zn 0.5 ml/ Insulin Human Regular 35 unit/ Potassium Chloride 20 meq/ 

Total Parenteral Nutrition/Amino Acids/Dextrose/ Fat Emulsion Intravenous 1,400 

ml @  58.333 mls/ hr TPN  CONT IV  Last administered on 4/17/20at 22:59;  Start 

4/17/20 at 22:00;  Stop 4/18/20 at 21:59;  Status DC


Sodium Chloride 1,000 ml @  1,000 mls/hr Q1H PRN IV hypotension;  Start 4/18/20 

at 08:27;  Stop 4/18/20 at 14:26;  Status DC


Albumin Human 200 ml @  200 mls/hr 1X PRN  PRN IV Hypotension Last administered 

on 4/18/20at 09:18;  Start 4/18/20 at 08:30;  Stop 4/18/20 at 14:29;  Status DC


Sodium Chloride 1,000 ml @  400 mls/hr Q2H30M PRN IV PATENCY;  Start 4/18/20 at 

08:27;  Stop 4/18/20 at 20:26;  Status DC


Info (PHARMACY MONITORING -- do not chart) 1 each PRN DAILY  PRN MC SEE 

COMMENTS;  Start 4/18/20 at 08:30;  Status Cancel


Info (PHARMACY MONITORING -- do not chart) 1 each PRN DAILY  PRN MC SEE 

COMMENTS;  Start 4/18/20 at 08:30;  Stop 4/26/20 at 13:10;  Status DC


Sodium Chloride 100 meq/Potassium Chloride 40 meq/ Magnesium Sulfate 15 

meq/Calcium Gluconate 15 meq/ Multivitamins 10 ml/Chromium/ Copper/Manganese/ 

Seleni/Zn 0.5 ml/ Insulin Human Regular 35 unit/ Total Parenteral 

Nutrition/Amino Acids/Dextrose/ Fat Emulsion Intravenous 1,400 ml @  58.333 mls/

hr TPN  CONT IV  Last administered on 4/18/20at 22:00;  Start 4/18/20 at 22:00; 

Stop 4/19/20 at 21:59;  Status DC


Potassium Chloride/Water 100 ml @  100 mls/hr 1X  ONCE IV  Last administered on 

4/18/20at 17:28;  Start 4/18/20 at 14:45;  Stop 4/18/20 at 15:44;  Status DC


Sodium Chloride 100 meq/Potassium Chloride 40 meq/ Magnesium Sulfate 15 meq/Calc

ium Gluconate 15 meq/ Multivitamins 10 ml/Chromium/ Copper/Manganese/ Seleni/Zn 

0.5 ml/ Insulin Human Regular 35 unit/ Total Parenteral Nutrition/Amino 

Acids/Dextrose/ Fat Emulsion Intravenous 1,400 ml @  58.333 mls/ hr TPN  CONT IV

 Last administered on 4/19/20at 22:46;  Start 4/19/20 at 22:00;  Stop 4/20/20 at

21:59;  Status DC


Sodium Chloride 100 meq/Potassium Chloride 40 meq/ Magnesium Sulfate 20 

meq/Calcium Gluconate 15 meq/ Multivitamins 10 ml/Chromium/ Copper/Manganese/ 

Seleni/Zn 0.5 ml/ Insulin Human Regular 35 unit/ Total Parenteral 

Nutrition/Amino Acids/Dextrose/ Fat Emulsion Intravenous 1,400 ml @  58.333 mls/

hr TPN  CONT IV  Last administered on 4/20/20at 22:31;  Start 4/20/20 at 22:00; 

Stop 4/21/20 at 21:59;  Status DC


Fentanyl Citrate (Fentanyl 2ml Vial) 50 mcg PRN Q2HR  PRN IVP PAIN Last 

administered on 4/27/20at 13:32;  Start 4/20/20 at 21:00;  Stop 4/28/20 at 12:53

;  Status DC


Fentanyl Citrate (Fentanyl 2ml Vial) 25 mcg PRN Q2HR  PRN IVP PAIN;  Start 

4/20/20 at 21:00;  Stop 4/28/20 at 12:54;  Status DC


Enoxaparin Sodium (Lovenox 100mg Syringe) 100 mg Q12HR SQ ;  Start 4/21/20 at 

21:00;  Status UNV


Amino Acids/ Glycerin/ Electrolytes 1,000 ml @  75 mls/hr [id number] IV ;  Start 

4/20/20 at 21:15;  Status UNV


Sodium Chloride 1,000 ml @  1,000 mls/hr Q1H PRN IV hypotension;  Start 4/21/20 

at 07:56;  Stop 4/21/20 at 13:55;  Status DC


Albumin Human 200 ml @  200 mls/hr 1X PRN  PRN IV Hypotension Last administered 

on 4/21/20at 08:40;  Start 4/21/20 at 08:00;  Stop 4/21/20 at 13:59;  Status DC


Sodium Chloride 1,000 ml @  400 mls/hr Q2H30M PRN IV PATENCY;  Start 4/21/20 at 

07:56;  Stop 4/21/20 at 19:55;  Status DC


Info (PHARMACY MONITORING -- do not chart) 1 each PRN DAILY  PRN MC SEE 

COMMENTS;  Start 4/21/20 at 08:00;  Status UNV


Info (PHARMACY MONITORING -- do not chart) 1 each PRN DAILY  PRN MC SEE 

COMMENTS;  Start 4/21/20 at 08:00;  Status UNV


Daptomycin 430 mg/ Sodium Chloride 50 ml @  100 mls/hr Q24H IV  Last 

administered on 4/21/20at 12:35;  Start 4/21/20 at 09:00;  Stop 4/21/20 at 

12:49;  Status DC


Sodium Chloride 100 meq/Potassium Chloride 40 meq/ Magnesium Sulfate 20 

meq/Calcium Gluconate 15 meq/ Multivitamins 10 ml/Chromium/ Copper/Manganese/ 

Seleni/Zn 0.5 ml/ Insulin Human Regular 35 unit/ Total Parenteral 

Nutrition/Amino Acids/Dextrose/ Fat Emulsion Intravenous 1,400 ml @  58.333 mls/

hr TPN  CONT IV  Last administered on 4/21/20at 21:26;  Start 4/21/20 at 22:00; 

Stop 4/22/20 at 21:59;  Status DC


Daptomycin 430 mg/ Sodium Chloride 50 ml @  100 mls/hr Q48H IV ;  Start 4/23/20 

at 09:00;  Stop 4/22/20 at 11:55;  Status DC


Sodium Chloride 100 meq/Potassium Chloride 40 meq/ Magnesium Sulfate 20 

meq/Calcium Gluconate 15 meq/ Multivitamins 10 ml/Chromium/ Copper/Manganese/ 

Seleni/Zn 0.5 ml/ Insulin Human Regular 35 unit/ Total Parenteral Nutrition/Amin

o Acids/Dextrose/ Fat Emulsion Intravenous 1,400 ml @  58.333 mls/ hr TPN  CONT 

IV  Last administered on 4/22/20at 22:27;  Start 4/22/20 at 22:00;  Stop 4/23/20

at 21:59;  Status DC


Daptomycin 430 mg/ Sodium Chloride 50 ml @  100 mls/hr Q24H IV  Last admin

istered on 4/24/20at 15:07;  Start 4/22/20 at 13:00;  Stop 4/25/20 at 13:15;  

Status DC


Sodium Chloride 100 meq/Potassium Chloride 40 meq/ Magnesium Sulfate 20 

meq/Calcium Gluconate 10 meq/ Multivitamins 10 ml/Chromium/ Copper/Manganese/ 

Seleni/Zn 0.5 ml/ Insulin Human Regular 35 unit/ Total Parenteral 

Nutrition/Amino Acids/Dextrose/ Fat Emulsion Intravenous 1,400 ml @  58.333 mls/

hr TPN  CONT IV  Last administered on 4/24/20at 00:06;  Start 4/23/20 at 22:00; 

Stop 4/24/20 at 21:59;  Status DC


Alteplase, Recombinant (Cathflo For Central Catheter Clearance) 1 mg 1X  ONCE 

INT CAT  Last administered on 4/24/20at 11:44;  Start 4/24/20 at 10:45;  Stop 

4/24/20 at 10:46;  Status DC


Ondansetron HCl (Zofran) 4 mg PRN Q6HRS  PRN IV NAUSEA/VOMITING;  Start 4/27/20 

at 07:00;  Stop 4/28/20 at 06:59;  Status DC


Fentanyl Citrate (Fentanyl 2ml Vial) 25 mcg PRN Q5MIN  PRN IV MILD PAIN 1-3;  

Start 4/27/20 at 07:00;  Stop 4/28/20 at 06:59;  Status DC


Fentanyl Citrate (Fentanyl 2ml Vial) 50 mcg PRN Q5MIN  PRN IV MODERATE TO SEVERE

PAIN Last administered on 4/27/20at 10:17;  Start 4/27/20 at 07:00;  Stop 

4/28/20 at 06:59;  Status DC


Ringer's Solution 1,000 ml @  30 mls/hr Q24H IV ;  Start 4/27/20 at 07:00;  Stop

4/27/20 at 18:59;  Status DC


Lidocaine HCl (Xylocaine-Mpf 1% 2ml Vial) 2 ml PRN 1X  PRN ID PRIOR TO IV START;

 Start 4/27/20 at 07:00;  Stop 4/28/20 at 06:59;  Status DC


Prochlorperazine Edisylate (Compazine) 5 mg PACU PRN  PRN IV NAUSEA, MRX1;  

Start 4/27/20 at 07:00;  Stop 4/28/20 at 06:59;  Status DC


Sodium Acetate 50 meq/Potassium Acetate 55 meq/ Magnesium Sulfate 20 meq/Calcium

Gluconate 10 meq/ Multivitamins 10 ml/Chromium/ Copper/Manganese/ Seleni/Zn 0.5 

ml/ Insulin Human Regular 35 unit/ Total Parenteral Nutrition/Amino 

Acids/Dextrose/ Fat Emulsion Intravenous 1,400 ml @  58.333 mls/ hr TPN  CONT IV

;  Start 4/24/20 at 22:00;  Stop 4/24/20 at 14:15;  Status DC


Sodium Acetate 50 meq/Potassium Acetate 55 meq/ Magnesium Sulfate 20 meq/Calcium

Gluconate 10 meq/ Multivitamins 10 ml/Chromium/ Copper/Manganese/ Seleni/Zn 0.5 

ml/ Insulin Human Regular 35 unit/ Total Parenteral Nutrition/Amino 

Acids/Dextrose/ Fat Emulsion Intravenous 1,800 ml @  75 mls/hr TPN  CONT IV  

Last administered on 4/24/20at 22:38;  Start 4/24/20 at 22:00;  Stop 4/25/20 at 

21:59;  Status DC


Sodium Chloride 1,000 ml @  1,000 mls/hr Q1H PRN IV hypotension;  Start 4/24/20 

at 15:31;  Stop 4/24/20 at 21:30;  Status DC


Diphenhydramine HCl (Benadryl) 25 mg 1X PRN  PRN IV ITCHING;  Start 4/24/20 at 

15:45;  Stop 4/25/20 at 15:44;  Status DC


Diphenhydramine HCl (Benadryl) 25 mg 1X PRN  PRN IV ITCHING;  Start 4/24/20 at 

15:45;  Stop 4/25/20 at 15:44;  Status DC


Sodium Chloride 1,000 ml @  400 mls/hr Q2H30M PRN IV PATENCY;  Start 4/24/20 at 

15:31;  Stop 4/25/20 at 03:30;  Status DC


Info (PHARMACY MONITORING -- do not chart) 1 each PRN DAILY  PRN MC SEE 

COMMENTS;  Start 4/24/20 at 15:45;  Stop 5/26/20 at 14:14;  Status DC


Sodium Acetate 50 meq/Potassium Acetate 55 meq/ Magnesium Sulfate 20 meq/Calcium

Gluconate 10 meq/ Multivitamins 10 ml/Chromium/ Copper/Manganese/ Seleni/Zn 0.5 

ml/ Insulin Human Regular 35 unit/ Total Parenteral Nutrition/Amino 

Acids/Dextrose/ Fat Emulsion Intravenous 1,800 ml @  75 mls/hr TPN  CONT IV  

Last administered on 4/25/20at 22:03;  Start 4/25/20 at 22:00;  Stop 4/26/20 at 

21:59;  Status DC


Daptomycin 430 mg/ Sodium Chloride 50 ml @  100 mls/hr Q24H IV  Last 

administered on 4/30/20at 13:00;  Start 4/25/20 at 13:00;  Stop 4/30/20 at 

20:58;  Status DC


Heparin Sodium (Porcine) 1000 unit/Sodium Chloride 1,001 ml @  1,001 mls/hr 1X  

ONCE IRR ;  Start 4/27/20 at 06:00;  Stop 4/27/20 at 06:59;  Status DC


Potassium Acetate 55 meq/Magnesium Sulfate 20 meq/ Calcium Gluconate 10 meq/ 

Multivitamins 10 ml/Chromium/ Copper/Manganese/ Seleni/Zn 0.5 ml/ Insulin Human 

Regular 35 unit/ Total Parenteral Nutrition/Amino Acids/Dextrose/ Fat Emulsion 

Intravenous 1,920 ml @  80 mls/hr TPN  CONT IV  Last administered on 4/26/20at 

22:10;  Start 4/26/20 at 22:00;  Stop 4/27/20 at 21:59;  Status DC


Dexamethasone Sodium Phosphate (Decadron) 4 mg STK-MED ONCE .ROUTE ;  Start 

4/27/20 at 10:56;  Stop 4/27/20 at 10:57;  Status DC


Ondansetron HCl (Zofran) 4 mg STK-MED ONCE .ROUTE ;  Start 4/27/20 at 10:56;  

Stop 4/27/20 at 10:57;  Status DC


Rocuronium Bromide (Zemuron) 50 mg STK-MED ONCE .ROUTE ;  Start 4/27/20 at 

10:56;  Stop 4/27/20 at 10:57;  Status DC


Fentanyl Citrate (Fentanyl 2ml Vial) 100 mcg STK-MED ONCE .ROUTE ;  Start 

4/27/20 at 10:56;  Stop 4/27/20 at 10:57;  Status DC


Bupivacaine HCl/ Epinephrine Bitart (Sensorcain-Epi 0.5%-1:[id number] Mpf) 30 ml 

STK-MED ONCE .ROUTE  Last administered on 4/27/20at 12:01;  Start 4/27/20 at 

10:58;  Stop 4/27/20 at 10:58;  Status DC


Cellulose (Surgicel Hemostat 2x14) 1 each STK-MED ONCE .ROUTE ;  Start 4/27/20 

at 10:58;  Stop 4/27/20 at 10:59;  Status DC


Iohexol (Omnipaque 300 Mg/ml) 50 ml STK-MED ONCE .ROUTE ;  Start 4/27/20 at 

10:58;  Stop 4/27/20 at 10:59;  Status DC


Cellulose (Surgicel Hemostat 4x8) 1 each STK-MED ONCE .ROUTE ;  Start 4/27/20 at

10:58;  Stop 4/27/20 at 10:59;  Status DC


Bisacodyl (Dulcolax Supp) 10 mg STK-MED ONCE .ROUTE ;  Start 4/27/20 at 10:59;  

Stop 4/27/20 at 10:59;  Status DC


Heparin Sodium (Porcine) 1000 unit/Sodium Chloride 1,001 ml @  1,001 mls/hr 1X  

ONCE IRR ;  Start 4/27/20 at 12:00;  Stop 4/27/20 at 12:59;  Status DC


Propofol 20 ml @ As Directed STK-MED ONCE IV ;  Start 4/27/20 at 11:05;  Stop 

4/27/20 at 11:05;  Status DC


Sevoflurane (Ultane) 90 ml STK-MED ONCE IH ;  Start 4/27/20 at 11:05;  Stop 

4/27/20 at 11:05;  Status DC


Sevoflurane (Ultane) 60 ml STK-MED ONCE IH ;  Start 4/27/20 at 12:26;  Stop 

4/27/20 at 12:27;  Status DC


Propofol 20 ml @ As Directed STK-MED ONCE IV ;  Start 4/27/20 at 12:26;  Stop 

4/27/20 at 12:27;  Status DC


Phenylephrine HCl (PHENYLEPHRINE in 0.9% NACL PF) 1 mg STK-MED ONCE IV ;  Start 

4/27/20 at 12:34;  Stop 4/27/20 at 12:34;  Status DC


Heparin Sodium (Porcine) (Heparin Sodium) 5,000 unit Q12HR SQ  Last administered

on 5/6/20at 20:57;  Start 4/27/20 at 21:00;  Stop 5/7/20 at 09:59;  Status DC


Sodium Chloride (Normal Saline Flush) 3 ml QSHIFT  PRN IV AFTER MEDS AND BLOOD 

DRAWS;  Start 4/27/20 at 13:45;  Status Cancel


Naloxone HCl (Narcan) 0.4 mg PRN Q2MIN  PRN IV SEE INSTRUCTIONS Last 

administered on 6/6/20at 15:15;  Start 4/27/20 at 13:45;  Stop 7/1/20 at 16:00; 

Status DC


Sodium Chloride 1,000 ml @  25 mls/hr Q24H IV  Last administered on 5/26/20at 

13:37;  Start 4/27/20 at 13:37;  Stop 5/29/20 at 13:09;  Status DC


Naloxone HCl (Narcan) 0.4 mg PRN Q2MIN  PRN IV SEE INSTRUCTIONS;  Start 4/27/20 

at 14:30;  Status UNV


Sodium Chloride 1,000 ml @  25 mls/hr Q24H IV ;  Start 4/27/20 at 14:30;  Status

UNV


Hydromorphone HCl 30 ml @ 0 mls/hr CONT PRN  PRN IV PER PROTOCOL Last 

administered on 5/2/20at 16:08;  Start 4/27/20 at 14:30;  Stop 5/4/20 at 08:55; 

Status DC


Potassium Acetate 55 meq/Magnesium Sulfate 20 meq/ Calcium Gluconate 10 meq/ 

Multivitamins 10 ml/Chromium/ Copper/Manganese/ Seleni/Zn 0.5 ml/ Insulin Human 

Regular 35 unit/ Total Parenteral Nutrition/Amino Acids/Dextrose/ Fat Emulsion 

Intravenous 1,920 ml @  80 mls/hr TPN  CONT IV  Last administered on 4/27/20at 

22:01;  Start 4/27/20 at 22:00;  Stop 4/28/20 at 21:59;  Status DC


Bumetanide (Bumex) 2 mg [id number] IV  Last administered on 5/1/20at 13:50;  Start 

4/28/20 at 14:00;  Stop 5/2/20 at 14:10;  Status DC


Meropenem 1 gm/ Sodium Chloride 100 ml @  200 mls/hr Q8HRS IV  Last administered

on 5/22/20at 05:53;  Start 4/28/20 at 14:00;  Stop 5/22/20 at 09:31;  Status DC


Potassium Acetate 55 meq/Magnesium Sulfate 20 meq/ Calcium Gluconate 10 meq/ 

Multivitamins 10 ml/Chromium/ Copper/Manganese/ Seleni/Zn 0.5 ml/ Insulin Human 

Regular 35 unit/ Total Parenteral Nutrition/Amino Acids/Dextrose/ Fat Emulsion 

Intravenous 1,920 ml @  80 mls/hr TPN  CONT IV  Last administered on 4/28/20at 

22:02;  Start 4/28/20 at 22:00;  Stop 4/29/20 at 21:59;  Status DC


Hydromorphone HCl (Dilaudid Standard PCA) 12 mg STK-MED ONCE IV ;  Start 4/27/20

at 14:35;  Stop 4/28/20 at 13:53;  Status DC


Artificial Tears (Artificial Tears) 1 drop PRN Q15MIN  PRN OU DRY EYE Last 

administered on 6/23/20at 21:17;  Start 4/29/20 at 05:30


Hydromorphone HCl (Dilaudid Standard PCA) 12 mg STK-MED ONCE IV ;  Start 4/28/20

at 12:05;  Stop 4/29/20 at 09:15;  Status DC


Potassium Acetate 65 meq/Magnesium Sulfate 20 meq/ Calcium Gluconate 10 meq/ 

Multivitamins 10 ml/Chromium/ Copper/Manganese/ Seleni/Zn 0.5 ml/ Insulin Human 

Regular 30 unit/ Total Parenteral Nutrition/Amino Acids/Dextrose/ Fat Emulsion 

Intravenous 1,920 ml @  80 mls/hr TPN  CONT IV  Last administered on 4/29/20at 

22:22;  Start 4/29/20 at 22:00;  Stop 4/30/20 at 21:59;  Status DC


Cyclobenzaprine HCl (Flexeril) 10 mg PRN Q6HRS  PRN PO MUSCLE SPASMS Last 

administered on 7/10/20at 19:12;  Start 4/30/20 at 10:45


Potassium Acetate 55 meq/Magnesium Sulfate 20 meq/ Calcium Gluconate 10 meq/ 

Multivitamins 10 ml/Chromium/ Copper/Manganese/ Seleni/Zn 0.5 ml/ Insulin Human 

Regular 30 unit/ Total Parenteral Nutrition/Amino Acids/Dextrose/ Fat Emulsion 

Intravenous 1,920 ml @  80 mls/hr TPN  CONT IV  Last administered on 5/1/20at 

01:00;  Start 4/30/20 at 22:00;  Stop 5/1/20 at 21:59;  Status DC


Magnesium Sulfate 50 ml @ 25 mls/hr 1X  ONCE IV  Last administered on 4/30/20at 

17:18;  Start 4/30/20 at 12:45;  Stop 4/30/20 at 14:44;  Status DC


Potassium Chloride/Water 100 ml @  100 mls/hr 1X  ONCE IV  Last administered on 

5/1/20at 11:27;  Start 5/1/20 at 12:00;  Stop 5/1/20 at 12:59;  Status DC


Hydromorphone HCl (Dilaudid Standard PCA) 12 mg STK-MED ONCE IV ;  Start 4/29/20

at 10:50;  Stop 5/1/20 at 11:02;  Status DC


Hydromorphone HCl (Dilaudid Standard PCA) 12 mg STK-MED ONCE IV ;  Start 4/30/20

at 13:47;  Stop 5/1/20 at 11:03;  Status DC


Potassium Acetate 30 meq/Magnesium Sulfate 20 meq/ Calcium Gluconate 10 meq/ 

Multivitamins 10 ml/Chromium/ Copper/Manganese/ Seleni/Zn 0.5 ml/ Insulin Human 

Regular 30 unit/ Potassium Chloride 30 meq/ Total Parenteral Nutrition/Amino 

Acids/Dextrose/ Fat Emulsion Intravenous 1,920 ml @  80 mls/hr TPN  CONT IV  

Last administered on 5/1/20at 22:34;  Start 5/1/20 at 22:00;  Stop 5/2/20 at 

21:59;  Status DC


Potassium Chloride/Water 100 ml @  100 mls/hr Q1H IV  Last administered on 

5/2/20at 13:05;  Start 5/2/20 at 07:00;  Stop 5/2/20 at 10:59;  Status DC


Magnesium Sulfate 50 ml @ 25 mls/hr 1X  ONCE IV  Last administered on 5/2/20at 

10:34;  Start 5/2/20 at 10:30;  Stop 5/2/20 at 12:29;  Status DC


Potassium Chloride 75 meq/ Magnesium Sulfate 20 meq/Calcium Gluconate 10 meq/ 

Multivitamins 10 ml/Chromium/ Copper/Manganese/ Seleni/Zn 0.5 ml/ Insulin Human 

Regular 30 unit/ Total Parenteral Nutrition/Amino Acids/Dextrose/ Fat Emulsion 

Intravenous 1,920 ml @  80 mls/hr TPN  CONT IV  Last administered on 5/2/20at 

21:51;  Start 5/2/20 at 22:00;  Stop 5/3/20 at 22:00;  Status DC


Potassium Chloride 75 meq/ Magnesium Sulfate 20 meq/Calcium Gluconate 10 meq/ 

Multivitamins 10 ml/Chromium/ Copper/Manganese/ Seleni/Zn 0.5 ml/ Insulin Human 

Regular 25 unit/ Total Parenteral Nutrition/Amino Acids/Dextrose/ Fat Emulsion 

Intravenous 1,920 ml @  80 mls/hr TPN  CONT IV  Last administered on 5/3/20at 

22:04;  Start 5/3/20 at 22:00;  Stop 5/4/20 at 21:59;  Status DC


Hydromorphone HCl (Dilaudid) 0.4 mg PRN Q4HRS  PRN IVP PAIN Last administered on

5/4/20at 10:57;  Start 5/4/20 at 09:00;  Stop 5/4/20 at 18:59;  Status DC


Micafungin Sodium 100 mg/Dextrose 100 ml @  100 mls/hr Q24H IV  Last 

administered on 5/26/20at 12:17;  Start 5/4/20 at 11:00;  Stop 5/27/20 at 09:59;

 Status DC


Daptomycin 485 mg/ Sodium Chloride 50 ml @  100 mls/hr Q24H IV  Last 

administered on 5/11/20at 13:10;  Start 5/4/20 at 11:00;  Stop 5/12/20 at 07:44;

 Status DC


Potassium Chloride 75 meq/ Magnesium Sulfate 15 meq/Calcium Gluconate 8 meq/ 

Multivitamins 10 ml/Chromium/ Copper/Manganese/ Seleni/Zn 0.5 ml/ Insulin Human 

Regular 25 unit/ Total Parenteral Nutrition/Amino Acids/Dextrose/ Fat Emulsion 

Intravenous 1,920 ml @  80 mls/hr TPN  CONT IV  Last administered on 5/4/20at 

23:08;  Start 5/4/20 at 22:00;  Stop 5/5/20 at 21:59;  Status DC


Haloperidol Lactate (Haldol Inj) 3 mg 1X  ONCE IVP  Last administered on 

5/4/20at 14:37;  Start 5/4/20 at 14:30;  Stop 5/4/20 at 14:31;  Status DC


Hydromorphone HCl (Dilaudid) 1 mg PRN Q4HRS  PRN IVP PAIN Last administered on 

5/18/20at 06:25;  Start 5/4/20 at 19:00;  Stop 5/18/20 at 17:10;  Status DC


Potassium Chloride 75 meq/ Magnesium Sulfate 15 meq/Calcium Gluconate 8 meq/ 

Multivitamins 10 ml/Chromium/ Copper/Manganese/ Seleni/Zn 0.5 ml/ Insulin Human 

Regular 20 unit/ Total Parenteral Nutrition/Amino Acids/Dextrose/ Fat Emulsion 

Intravenous 1,920 ml @  80 mls/hr TPN  CONT IV  Last administered on 5/5/20at 

22:10;  Start 5/5/20 at 22:00;  Stop 5/6/20 at 21:59;  Status DC


Lidocaine HCl (Buffered Lidocaine 1%) 3 ml STK-MED ONCE .ROUTE ;  Start 5/6/20 

at 11:31;  Stop 5/6/20 at 11:31;  Status DC


Lidocaine HCl (Buffered Lidocaine 1%) 3 ml STK-MED ONCE .ROUTE ;  Start 5/6/20 

at 12:28;  Stop 5/6/20 at 12:29;  Status DC


Lidocaine HCl (Buffered Lidocaine 1%) 6 ml 1X  ONCE INJ  Last administered on 

5/6/20at 12:53;  Start 5/6/20 at 12:45;  Stop 5/6/20 at 12:46;  Status DC


Potassium Chloride 75 meq/ Magnesium Sulfate 15 meq/Calcium Gluconate 8 meq/ M

ultivitamins 10 ml/Chromium/ Copper/Manganese/ Seleni/Zn 0.5 ml/ Insulin Human 

Regular 20 unit/ Total Parenteral Nutrition/Amino Acids/Dextrose/ Fat Emulsion 

Intravenous 1,920 ml @  80 mls/hr TPN  CONT IV  Last administered on 5/6/20at 

22:00;  Start 5/6/20 at 22:00;  Stop 5/7/20 at 21:59;  Status DC


Potassium Chloride 75 meq/ Magnesium Sulfate 15 meq/Calcium Gluconate 8 meq/ 

Multivitamins 10 ml/Chromium/ Copper/Manganese/ Seleni/Zn 0.5 ml/ Insulin Human 

Regular 15 unit/ Total Parenteral Nutrition/Amino Acids/Dextrose/ Fat Emulsion 

Intravenous 1,920 ml @  80 mls/hr TPN  CONT IV  Last administered on 5/7/20at 

22:28;  Start 5/7/20 at 22:00;  Stop 5/8/20 at 21:59;  Status DC


Vecuronium Bromide (Norcuron Bolus) 6 mg PRN Q6HRS  PRN IV VENT ASYNCHRONY;  

Start 5/7/20 at 19:15;  Stop 5/7/20 at 19:35;  Status DC


Bumetanide (Bumex) 2 mg 1X  ONCE IV  Last administered on 5/7/20at 22:09;  Start

5/7/20 at 19:45;  Stop 5/7/20 at 19:46;  Status DC


Lidocaine HCl (Buffered Lidocaine 1%) 3 ml STK-MED ONCE .ROUTE ;  Start 5/8/20 

at 07:59;  Stop 5/8/20 at 07:59;  Status DC


Midazolam HCl (Versed) 5 mg STK-MED ONCE .ROUTE ;  Start 5/8/20 at 08:36;  Stop 

5/8/20 at 08:36;  Status DC


Fentanyl Citrate (Fentanyl 5ml Vial) 250 mcg STK-MED ONCE .ROUTE ;  Start 5/8/20

at 08:36;  Stop 5/8/20 at 08:37;  Status DC


Lidocaine HCl (Buffered Lidocaine 1%) 3 ml 1X  ONCE IJ  Last administered on 

5/8/20at 09:30;  Start 5/8/20 at 09:15;  Stop 5/8/20 at 09:16;  Status DC


Midazolam HCl (Versed) 5 mg 1X  ONCE IV  Last administered on 5/8/20at 09:30;  

Start 5/8/20 at 09:15;  Stop 5/8/20 at 09:16;  Status DC


Fentanyl Citrate (Fentanyl 5ml Vial) 250 mcg 1X  ONCE IV  Last administered on 

5/8/20at 09:30;  Start 5/8/20 at 09:15;  Stop 5/8/20 at 09:16;  Status DC


Bumetanide (Bumex) 2 mg DAILY IV  Last administered on 5/18/20at 08:07;  Start 

5/8/20 at 10:00;  Stop 5/18/20 at 17:15;  Status DC


Potassium Chloride 75 meq/ Magnesium Sulfate 15 meq/ Multivitamins 10 

ml/Chromium/ Copper/Manganese/ Seleni/Zn 0.5 ml/ Insulin Human Regular 15 unit/ 

Total Parenteral Nutrition/Amino Acids/Dextrose/ Fat Emulsion Intravenous 1,920 

ml @  80 mls/hr TPN  CONT IV  Last administered on 5/8/20at 21:59;  Start 5/8/20

at 22:00;  Stop 5/9/20 at 21:59;  Status DC


Metoclopramide HCl (Reglan Vial) 10 mg PRN Q3HRS  PRN IVP NAUSEA/VOMITING-3rd 

choice Last administered on 5/14/20at 04:25;  Start 5/9/20 at 16:45


Potassium Chloride 75 meq/ Magnesium Sulfate 15 meq/ Multivitamins 10 

ml/Chromium/ Copper/Manganese/ Seleni/Zn 0.5 ml/ Insulin Human Regular 15 unit/ 

Total Parenteral Nutrition/Amino Acids/Dextrose/ Fat Emulsion Intravenous 1,920 

ml @  80 mls/hr TPN  CONT IV  Last administered on 5/9/20at 22:41;  Start 5/9/20

at 22:00;  Stop 5/10/20 at 21:59;  Status DC


Magnesium Sulfate 50 ml @ 25 mls/hr 1X  ONCE IV  Last administered on 5/10/20at 

10:44;  Start 5/10/20 at 09:00;  Stop 5/10/20 at 10:59;  Status DC


Potassium Chloride/Water 100 ml @  100 mls/hr 1X  ONCE IV  Last administered on 

5/10/20at 09:37;  Start 5/10/20 at 09:00;  Stop 5/10/20 at 09:59;  Status DC


Duloxetine HCl (Cymbalta) 30 mg DAILY PO  Last administered on 5/11/20at 09:48; 

Start 5/10/20 at 14:00;  Stop 5/13/20 at 10:25;  Status DC


Potassium Chloride 80 meq/ Magnesium Sulfate 20 meq/ Multivitamins 10 

ml/Chromium/ Copper/Manganese/ Seleni/Zn 0.5 ml/ Insulin Human Regular 15 unit/ 

Total Parenteral Nutrition/Amino Acids/Dextrose/ Fat Emulsion Intravenous 1,920 

ml @  80 mls/hr TPN  CONT IV  Last administered on 5/10/20at 21:42;  Start 

5/10/20 at 22:00;  Stop 5/11/20 at 21:59;  Status DC


Potassium Chloride 80 meq/ Magnesium Sulfate 20 meq/ Multivitamins 10 

ml/Chromium/ Copper/Manganese/ Seleni/Zn 0.5 ml/ Insulin Human Regular 15 unit/ 

Total Parenteral Nutrition/Amino Acids/Dextrose/ Fat Emulsion Intravenous 1,920 

ml @  80 mls/hr TPN  CONT IV  Last administered on 5/11/20at 22:20;  Start 5/1 1/20 at 22:00;  Stop 5/12/20 at 21:59;  Status DC


Lidocaine HCl (Buffered Lidocaine 1%) 3 ml STK-MED ONCE .ROUTE ;  Start 5/12/20 

at 09:54;  Stop 5/12/20 at 09:55;  Status DC


Hydromorphone HCl (Dilaudid Standard PCA) 12 mg STK-MED ONCE IV ;  Start 5/1/20 

at 15:50;  Stop 5/12/20 at 11:24;  Status DC


Potassium Chloride 80 meq/ Magnesium Sulfate 20 meq/ Multivitamins 10 

ml/Chromium/ Copper/Manganese/ Seleni/Zn 0.5 ml/ Insulin Human Regular 15 unit/ 

Total Parenteral Nutrition/Amino Acids/Dextrose/ Fat Emulsion Intravenous 1,920 

ml @  80 mls/hr TPN  CONT IV  Last administered on 5/12/20at 21:40;  Start 

5/12/20 at 22:00;  Stop 5/13/20 at 21:59;  Status DC


Lidocaine HCl (Buffered Lidocaine 1%) 6 ml 1X  ONCE INJ  Last administered on 

5/12/20at 14:15;  Start 5/12/20 at 14:15;  Stop 5/12/20 at 14:16;  Status DC


Potassium Chloride 80 meq/ Magnesium Sulfate 20 meq/ Multivitamins 10 

ml/Chromium/ Copper/Manganese/ Seleni/Zn 1 ml/ Insulin Human Regular 15 unit/ 

Total Parenteral Nutrition/Amino Acids/Dextrose/ Fat Emulsion Intravenous 1,920 

ml @  80 mls/hr TPN  CONT IV  Last administered on 5/13/20at 22:04;  Start 

5/13/20 at 22:00;  Stop 5/14/20 at 21:59;  Status DC


Potassium Chloride/Water 100 ml @  100 mls/hr 1X  ONCE IV  Last administered on 

5/14/20at 11:34;  Start 5/14/20 at 11:00;  Stop 5/14/20 at 11:59;  Status DC


Potassium Chloride 90 meq/ Magnesium Sulfate 20 meq/ Multivitamins 10 

ml/Chromium/ Copper/Manganese/ Seleni/Zn 1 ml/ Insulin Human Regular 15 unit/ 

Total Parenteral Nutrition/Amino Acids/Dextrose/ Fat Emulsion Intravenous 1,920 

ml @  80 mls/hr TPN  CONT IV  Last administered on 5/14/20at 22:57;  Start 

5/14/20 at 22:00;  Stop 5/15/20 at 21:59;  Status DC


Potassium Chloride 90 meq/ Magnesium Sulfate 20 meq/ Multivitamins 10 

ml/Chromium/ Copper/Manganese/ Seleni/Zn 1 ml/ Insulin Human Regular 15 unit/ 

Total Parenteral Nutrition/Amino Acids/Dextrose/ Fat Emulsion Intravenous 1,920 

ml @  80 mls/hr TPN  CONT IV  Last administered on 5/15/20at 22:48;  Start 

5/15/20 at 22:00;  Stop 5/16/20 at 21:59;  Status DC


Potassium Chloride 90 meq/ Magnesium Sulfate 20 meq/ Multivitamins 10 

ml/Chromium/ Copper/Manganese/ Seleni/Zn 1 ml/ Insulin Human Regular 15 unit/ 

Total Parenteral Nutrition/Amino Acids/Dextrose/ Fat Emulsion Intravenous 1,890 

ml @  78.75 mls/ hr TPN  CONT IV  Last administered on 5/16/20at 22:15;  Start 

5/16/20 at 22:00;  Stop 5/17/20 at 21:59;  Status DC


Linezolid/Dextrose 300 ml @  300 mls/hr Q12HR IV  Last administered on 5/19/20at

21:08;  Start 5/17/20 at 09:00;  Stop 5/20/20 at 08:11;  Status DC


Daptomycin 450 mg/ Sodium Chloride 50 ml @  100 mls/hr Q24H IV  Last 

administered on 5/20/20at 09:25;  Start 5/17/20 at 09:00;  Stop 5/21/20 at 

08:30;  Status DC


Potassium Chloride 90 meq/ Magnesium Sulfate 20 meq/ Multivitamins 10 

ml/Chromium/ Copper/Manganese/ Seleni/Zn 1 ml/ Insulin Human Regular 15 unit/ 

Total Parenteral Nutrition/Amino Acids/Dextrose/ Fat Emulsion Intravenous 1,890 

ml @  78.75 mls/ hr TPN  CONT IV  Last administered on 5/17/20at 21:34;  Start 

5/17/20 at 22:00;  Stop 5/18/20 at 21:59;  Status DC


Lorazepam (Ativan Inj) 2 mg STK-MED ONCE .ROUTE ;  Start 5/17/20 at 14:58;  Stop

5/17/20 at 14:58;  Status DC


Metoprolol Tartrate (Lopressor Vial) 5 mg 1X  ONCE IVP  Last administered on 

5/17/20at 15:31;  Start 5/17/20 at 15:15;  Stop 5/17/20 at 15:16;  Status DC


Lorazepam (Ativan Inj) 2 mg 1X  ONCE IVP  Last administered on 5/17/20at 15:30; 

Start 5/17/20 at 15:15;  Stop 5/17/20 at 15:16;  Status DC


Enoxaparin Sodium (Lovenox 40mg Syringe) 40 mg Q24H SQ  Last administered on 

6/5/20at 17:44;  Start 5/17/20 at 17:00;  Stop 6/7/20 at 06:50;  Status DC


Lorazepam (Ativan Inj) 1 mg PRN Q4HRS  PRN IVP ANXIETY / AGITATION MILD-MOD Last

administered on 5/31/20at 15:55;  Start 5/17/20 at 19:15;  Stop 6/2/20 at 11:45;

 Status DC


Lorazepam (Ativan Inj) 2 mg PRN Q4HRS  PRN IVP ANXIETY / AGITATION SEVERE Last 

administered on 6/1/20at 07:55;  Start 5/17/20 at 19:15;  Stop 6/2/20 at 11:45; 

Status DC


Fentanyl Citrate (Fentanyl 2ml Vial) 50 mcg PRN Q4HRS  PRN IVP SEVERE PAIN Last 

administered on 6/13/20at 05:15;  Start 5/18/20 at 13:15;  Stop 6/14/20 at 

09:29;  Status DC


Fentanyl Citrate (Fentanyl 2ml Vial) 25 mcg PRN Q4HRS  PRN IVP MODERATE PAIN 

Last administered on 6/13/20at 00:27;  Start 5/18/20 at 13:15;  Stop 6/14/20 at 

09:30;  Status DC


Potassium Chloride 90 meq/ Magnesium Sulfate 20 meq/ Multivitamins 10 

ml/Chromium/ Copper/Manganese/ Seleni/Zn 1 ml/ Insulin Human Regular 15 unit/ 

Total Parenteral Nutrition/Amino Acids/Dextrose/ Fat Emulsion Intravenous 1,890 

ml @  78.75 mls/ hr TPN  CONT IV  Last administered on 5/18/20at 22:18;  Start 

5/18/20 at 22:00;  Stop 5/19/20 at 21:59;  Status DC


Furosemide (Lasix) 40 mg 1X  ONCE IVP  Last administered on 5/18/20at 21:51;  

Start 5/18/20 at 21:45;  Stop 5/18/20 at 21:48;  Status DC


Albumin Human 100 ml @  100 mls/hr 1X PRN  PRN IV SEE COMMENTS;  Start 5/19/20 

at 01:30


Furosemide (Lasix) 40 mg [id number] IVP  Last administered on 6/3/20at 08:04;  Start 

5/19/20 at 14:00;  Stop 6/3/20 at 13:07;  Status DC


Potassium Chloride 90 meq/ Magnesium Sulfate 20 meq/ Multivitamins 10 

ml/Chromium/ Copper/Manganese/ Seleni/Zn 1 ml/ Insulin Human Regular 15 unit/ 

Total Parenteral Nutrition/Amino Acids/Dextrose/ Fat Emulsion Intravenous 1,800 

ml @  75 mls/hr TPN  CONT IV  Last administered on 5/19/20at 22:31;  Start 

5/19/20 at 22:00;  Stop 5/20/20 at 21:59;  Status DC


Potassium Chloride 90 meq/ Magnesium Sulfate 20 meq/ Multivitamins 10 

ml/Chromium/ Copper/Manganese/ Seleni/Zn 1 ml/ Insulin Human Regular 15 unit/ 

Total Parenteral Nutrition/Amino Acids/Dextrose/ Fat Emulsion Intravenous 1,800 

ml @  75 mls/hr TPN  CONT IV  Last administered on 5/20/20at 22:28;  Start 

5/20/20 at 22:00;  Stop 5/21/20 at 21:59;  Status DC


Potassium Chloride 110 meq/ Magnesium Sulfate 20 meq/ Multivitamins 10 

ml/Chromium/ Copper/Manganese/ Seleni/Zn 1 ml/ Insulin Human Regular 15 unit/ 

Total Parenteral Nutrition/Amino Acids/Dextrose/ Fat Emulsion Intravenous 1,800 

ml @  75 mls/hr TPN  CONT IV  Last administered on 5/21/20at 22:01;  Start 

5/21/20 at 22:00;  Stop 5/22/20 at 21:59;  Status DC


Saliva Substitute (Biotene Moisturizing Mouth) 2 spray PRN Q15MIN  PRN PO DRY 

MOUTH;  Start 5/21/20 at 11:00


Potassium Chloride 110 meq/ Magnesium Sulfate 20 meq/ Multivitamins 10 

ml/Chromium/ Copper/Manganese/ Seleni/Zn 1 ml/ Insulin Human Regular 15 unit/ 

Total Parenteral Nutrition/Amino Acids/Dextrose/ Fat Emulsion Intravenous 1,800 

ml @  75 mls/hr TPN  CONT IV  Last administered on 5/22/20at 22:21;  Start 

5/22/20 at 22:00;  Stop 5/23/20 at 21:59;  Status DC


Potassium Chloride 110 meq/ Magnesium Sulfate 20 meq/ Multivitamins 10 

ml/Chromium/ Copper/Manganese/ Seleni/Zn 1 ml/ Insulin Human Regular 15 unit/ 

Total Parenteral Nutrition/Amino Acids/Dextrose/ Fat Emulsion Intravenous 1,800 

ml @  75 mls/hr TPN  CONT IV  Last administered on 5/23/20at 22:04;  Start 

5/23/20 at 22:00;  Stop 5/24/20 at 21:59;  Status DC


Potassium Chloride 110 meq/ Magnesium Sulfate 20 meq/ Multivitamins 10 

ml/Chromium/ Copper/Manganese/ Seleni/Zn 1 ml/ Insulin Human Regular 15 unit/ 

Total Parenteral Nutrition/Amino Acids/Dextrose/ Fat Emulsion Intravenous 1,800 

ml @  75 mls/hr TPN  CONT IV  Last administered on 5/24/20at 22:48;  Start 

5/24/20 at 22:00;  Stop 5/25/20 at 21:59;  Status DC


Potassium Chloride 70 meq/ Magnesium Sulfate 20 meq/ Multivitamins 10 

ml/Chromium/ Copper/Manganese/ Seleni/Zn 1 ml/ Insulin Human Regular 15 unit/ 

Total Parenteral Nutrition/Amino Acids/Dextrose/ Fat Emulsion Intravenous 1,800 

ml @  75 mls/hr TPN  CONT IV  Last administered on 5/25/20at 21:39;  Start 

5/25/20 at 22:00;  Stop 5/26/20 at 21:59;  Status DC


Meropenem 500 mg/ Sodium Chloride 50 ml @  100 mls/hr Q6HRS IV  Last 

administered on 5/27/20at 06:02;  Start 5/25/20 at 18:00;  Stop 5/27/20 at 

09:59;  Status DC


Barium Sulfate (Varibar Thin Liquid Apple) 148 gm 1X  ONCE PO ;  Start 5/26/20 

at 11:45;  Stop 5/26/20 at 11:49;  Status DC


Potassium Chloride 70 meq/ Magnesium Sulfate 20 meq/ Multivitamins 10 

ml/Chromium/ Copper/Manganese/ Seleni/Zn 1 ml/ Insulin Human Regular 15 unit/ 

Total Parenteral Nutrition/Amino Acids/Dextrose/ Fat Emulsion Intravenous 1,800 

ml @  75 mls/hr TPN  CONT IV  Last administered on 5/26/20at 22:27;  Start 

5/26/20 at 22:00;  Stop 5/27/20 at 21:59;  Status DC


Piperacillin Sod/ Tazobactam Sod 3.375 gm/Sodium Chloride 50 ml @  100 mls/hr 

Q6HRS IV  Last administered on 6/4/20at 06:10;  Start 5/27/20 at 12:00;  Stop 

6/4/20 at 07:26;  Status DC


Potassium Chloride 70 meq/ Magnesium Sulfate 20 meq/ Multivitamins 10 

ml/Chromium/ Copper/Manganese/ Seleni/Zn 1 ml/ Insulin Human Regular 15 unit/ 

Total Parenteral Nutrition/Amino Acids/Dextrose/ Fat Emulsion Intravenous 1,800 

ml @  75 mls/hr TPN  CONT IV  Last administered on 5/27/20at 22:03;  Start 

5/27/20 at 22:00;  Stop 5/28/20 at 21:59;  Status DC


Potassium Chloride 70 meq/ Magnesium Sulfate 20 meq/ Multivitamins 10 

ml/Chromium/ Copper/Manganese/ Seleni/Zn 1 ml/ Insulin Human Regular 15 unit/ 

Total Parenteral Nutrition/Amino Acids/Dextrose/ Fat Emulsion Intravenous 1,800 

ml @  75 mls/hr TPN  CONT IV  Last administered on 5/28/20at 22:33;  Start 

5/28/20 at 22:00;  Stop 5/29/20 at 21:59;  Status DC


Potassium Chloride 70 meq/ Magnesium Sulfate 20 meq/ Multivitamins 10 

ml/Chromium/ Copper/Manganese/ Seleni/Zn 1 ml/ Insulin Human Regular 15 unit/ 

Total Parenteral Nutrition/Amino Acids/Dextrose/ Fat Emulsion Intravenous 1,800 

ml @  75 mls/hr TPN  CONT IV  Last administered on 5/29/20at 23:13;  Start 

5/29/20 at 22:00;  Stop 5/30/20 at 21:59;  Status DC


Potassium Chloride 80 meq/ Magnesium Sulfate 20 meq/ Multivitamins 10 

ml/Chromium/ Copper/Manganese/ Seleni/Zn 1 ml/ Insulin Human Regular 15 unit/ 

Total Parenteral Nutrition/Amino Acids/Dextrose/ Fat Emulsion Intravenous 1,800 

ml @  75 mls/hr TPN  CONT IV  Last administered on 5/30/20at 22:30;  Start 

5/30/20 at 22:00;  Stop 5/31/20 at 21:59;  Status DC


Potassium Chloride 80 meq/ Magnesium Sulfate 20 meq/ Multivitamins 10 

ml/Chromium/ Copper/Manganese/ Seleni/Zn 1 ml/ Insulin Human Regular 15 unit/ 

Total Parenteral Nutrition/Amino Acids/Dextrose/ Fat Emulsion Intravenous 1,800 

ml @  75 mls/hr TPN  CONT IV  Last administered on 5/31/20at 21:54;  Start 

5/31/20 at 22:00;  Stop 6/1/20 at 21:59;  Status DC


Potassium Chloride/Water 100 ml @  100 mls/hr 1X  ONCE IV  Last administered on 

6/1/20at 10:15;  Start 6/1/20 at 10:00;  Stop 6/1/20 at 10:59;  Status DC


Potassium Chloride 90 meq/ Magnesium Sulfate 20 meq/ Multivitamins 10 

ml/Chromium/ Copper/Manganese/ Seleni/Zn 1 ml/ Insulin Human Regular 20 unit/ 

Total Parenteral Nutrition/Amino Acids/Dextrose/ Fat Emulsion Intravenous 1,800 

ml @  75 mls/hr TPN  CONT IV  Last administered on 6/1/20at 22:28;  Start 6/1/20

at 22:00;  Stop 6/2/20 at 21:59;  Status DC


Potassium Chloride 90 meq/ Magnesium Sulfate 20 meq/ Multivitamins 10 

ml/Chromium/ Copper/Manganese/ Seleni/Zn 1 ml/ Insulin Human Regular 20 unit/ 

Total Parenteral Nutrition/Amino Acids/Dextrose/ Fat Emulsion Intravenous 1,800 

ml @  75 mls/hr TPN  CONT IV  Last administered on 6/2/20at 22:08;  Start 6/2/20

at 22:00;  Stop 6/3/20 at 21:59;  Status DC


Lorazepam (Ativan Inj) 0.25 mg PRN Q4HRS  PRN IVP ANXIETY / AGITATION Last 

administered on 7/20/20at 03:28;  Start 6/3/20 at 07:30


Potassium Chloride 90 meq/ Magnesium Sulfate 20 meq/ Multivitamins 10 

ml/Chromium/ Copper/Manganese/ Seleni/Zn 1 ml/ Insulin Human Regular 20 unit/ 

Total Parenteral Nutrition/Amino Acids/Dextrose/ Fat Emulsion Intravenous 1,800 

ml @  75 mls/hr TPN  CONT IV  Last administered on 6/3/20at 23:13;  Start 6/3/20

at 22:00;  Stop 6/4/20 at 21:59;  Status DC


Furosemide (Lasix) 40 mg DAILY IVP  Last administered on 6/5/20at 11:14;  Start 

6/3/20 at 13:30;  Stop 6/7/20 at 09:12;  Status DC


Fluoxetine HCl (PROzac) 20 mg QHS PEG  Last administered on 7/19/20at 21:22;  

Start 6/4/20 at 21:00


Fentanyl (Duragesic 50mcg/ Hr Patch) 1 patch Q72H TD  Last administered on 

6/4/20at 21:22;  Start 6/4/20 at 21:00;  Stop 6/13/20 at 12:00;  Status DC


Potassium Chloride 40 meq/ Potassium Acetate 60 meq/Magnesium Sulfate 10 meq/ 

Multivitamins 10 ml/Chromium/ Copper/Manganese/ Seleni/Zn 1 ml/ Insulin Human 

Regular 20 unit/ Total Parenteral Nutrition/Amino Acids/Dextrose/ Fat Emulsion 

Intravenous 1,800 ml @  75 mls/hr TPN  CONT IV  Last administered on 6/5/20at 

00:03;  Start 6/4/20 at 22:00;  Stop 6/5/20 at 21:59;  Status DC


Potassium Acetate 80 meq/Magnesium Sulfate 5 meq/ Multivitamins 10 ml/Chromium/ 

Copper/Manganese/ Seleni/Zn 1 ml/ Insulin Human Regular 20 unit/ Total 

Parenteral Nutrition/Amino Acids/Dextrose/ Fat Emulsion Intravenous 1,920 ml @  

80 mls/hr TPN  CONT IV  Last administered on 6/5/20at 21:59;  Start 6/5/20 at 

22:00;  Stop 6/6/20 at 21:59;  Status DC


Potassium Acetate 60 meq/Magnesium Sulfate 5 meq/ Multivitamins 10 ml/Chromium/ 

Copper/Manganese/ Seleni/Zn 1 ml/ Insulin Human Regular 30 unit/ Total 

Parenteral Nutrition/Amino Acids/Dextrose/ Fat Emulsion Intravenous 1,920 ml @  

80 mls/hr TPN  CONT IV  Last administered on 6/6/20at 21:54;  Start 6/6/20 at 

22:00;  Stop 6/7/20 at 21:59;  Status DC


Norepinephrine Bitartrate 8 mg/ Dextrose 258 ml @  13.332 mls/ hr CONT  PRN IV 

PER PROTOCOL Last administered on 7/2/20at 09:09;  Start 6/7/20 at 06:30


Albumin Human 500 ml @  125 mls/hr 1X  ONCE IV  Last administered on 6/7/20at 

08:10;  Start 6/7/20 at 08:15;  Stop 6/7/20 at 12:14;  Status DC


Potassium Acetate 40 meq/Magnesium Sulfate 5 meq/ Multivitamins 10 ml/Chromium/ 

Copper/Manganese/ Seleni/Zn 1 ml/ Insulin Human Regular 30 unit/ Total 

Parenteral Nutrition/Amino Acids/Dextrose/ Fat Emulsion Intravenous 1,920 ml @  

80 mls/hr TPN  CONT IV  Last administered on 6/7/20at 22:23;  Start 6/7/20 at 

22:00;  Stop 6/8/20 at 21:59;  Status DC


Meropenem 1 gm/ Sodium Chloride 100 ml @  200 mls/hr Q8HRS IV ;  Start 6/7/20 at

14:00;  Status Cancel


Meropenem 1 gm/ Sodium Chloride 100 ml @  200 mls/hr Q8HRS IV  Last administered

on 6/7/20at 11:04;  Start 6/7/20 at 10:00;  Stop 6/7/20 at 13:00;  Status DC


Meropenem 1 gm/ Sodium Chloride 100 ml @  200 mls/hr Q12HR IV  Last administered

on 6/25/20at 08:27;  Start 6/7/20 at 21:00;  Stop 6/25/20 at 08:56;  Status DC


Sodium Chloride 1,000 ml @  1,000 mls/hr 1X  ONCE IV  Last administered on 

6/7/20at 11:06;  Start 6/7/20 at 10:45;  Stop 6/7/20 at 11:44;  Status DC


Micafungin Sodium 100 mg/Dextrose 100 ml @  100 mls/hr Q24H IV  Last adminis

tered on 6/24/20at 12:34;  Start 6/7/20 at 11:00;  Stop 6/25/20 at 08:56;  

Status DC


Daptomycin 410 mg/ Sodium Chloride 50 ml @  100 mls/hr Q24H IV  Last 

administered on 6/9/20at 13:33;  Start 6/7/20 at 14:00;  Stop 6/10/20 at 08:30; 

Status DC


Midazolam HCl (Versed) 2 mg STK-MED ONCE .ROUTE ;  Start 6/7/20 at 14:47;  Stop 

6/7/20 at 14:48;  Status DC


Fentanyl Citrate (Fentanyl 2ml Vial) 100 mcg STK-MED ONCE .ROUTE ;  Start 6/7/20

at 14:47;  Stop 6/7/20 at 14:48;  Status DC


Flumazenil (Romazicon) 0.5 mg STK-MED ONCE IV ;  Start 6/7/20 at 14:48;  Stop 

6/7/20 at 14:48;  Status DC


Naloxone HCl (Narcan) 0.4 mg STK-MED ONCE .ROUTE ;  Start 6/7/20 at 14:48;  Stop

6/7/20 at 14:48;  Status DC


Lidocaine HCl (Lidocaine 1% 20ml Vial) 20 ml STK-MED ONCE .ROUTE ;  Start 6/7/20

at 14:48;  Stop 6/7/20 at 14:48;  Status DC


Midazolam HCl (Versed) 2 mg 1X  ONCE IV  Last administered on 6/7/20at 15:28;  

Start 6/7/20 at 15:00;  Stop 6/7/20 at 15:01;  Status DC


Fentanyl Citrate (Fentanyl 2ml Vial) 100 mcg 1X  ONCE IV  Last administered on 

6/7/20at 15:28;  Start 6/7/20 at 15:00;  Stop 6/7/20 at 15:01;  Status DC


Lidocaine HCl (Lidocaine 1% 20ml Vial) 20 ml 1X  ONCE INJ  Last administered on 

6/7/20at 15:30;  Start 6/7/20 at 15:00;  Stop 6/7/20 at 15:01;  Status DC


Sodium Chloride 1,000 ml @  100 mls/hr Q10H IV  Last administered on 6/16/20at 

07:30;  Start 6/7/20 at 20:00;  Stop 6/16/20 at 11:26;  Status DC


Sodium Bicarbonate (Sodium Bicarb Adult 8.4% Syr) 50 meq 1X  ONCE IV  Last 

administered on 6/7/20at 21:47;  Start 6/7/20 at 22:00;  Stop 6/7/20 at 22:01;  

Status DC


Potassium Acetate 40 meq/Magnesium Sulfate 5 meq/ Multivitamins 10 ml/Chromium/ 

Copper/Manganese/ Seleni/Zn 1 ml/ Insulin Human Regular 30 unit/ Total 

Parenteral Nutrition/Amino Acids/Dextrose/ Fat Emulsion Intravenous 1,920 ml @  

80 mls/hr TPN  CONT IV  Last administered on 6/8/20at 22:28;  Start 6/8/20 at 

22:00;  Stop 6/9/20 at 21:59;  Status DC


Sodium Chloride 500 ml @  500 mls/hr 1X  ONCE IV  Last administered on 6/9/20at 

06:39;  Start 6/9/20 at 06:45;  Stop 6/9/20 at 07:44;  Status DC


Potassium Acetate 40 meq/Magnesium Sulfate 5 meq/ Multivitamins 10 ml/Chromium/ 

Copper/Manganese/ Seleni/Zn 1 ml/ Insulin Human Regular 30 unit/ Total 

Parenteral Nutrition/Amino Acids/Dextrose/ Fat Emulsion Intravenous 1,920 ml @  

80 mls/hr TPN  CONT IV  Last administered on 6/9/20at 22:03;  Start 6/9/20 at 

22:00;  Stop 6/10/20 at 21:59;  Status DC


Metoprolol Tartrate (Lopressor Vial) 5 mg PRN Q6HRS  PRN IVP HYPERTENSION Last 

administered on 7/18/20at 17:50;  Start 6/10/20 at 09:00


Potassium Acetate 40 meq/Magnesium Sulfate 5 meq/ Multivitamins 10 ml/Chromium/ 

Copper/Manganese/ Seleni/Zn 1 ml/ Insulin Human Regular 30 unit/ Total 

Parenteral Nutrition/Amino Acids/Dextrose/ Fat Emulsion Intravenous 1,920 ml @  

80 mls/hr TPN  CONT IV  Last administered on 6/10/20at 21:26;  Start 6/10/20 at 

22:00;  Stop 6/11/20 at 21:59;  Status DC


Potassium Acetate 40 meq/Magnesium Sulfate 5 meq/ Multivitamins 10 ml/Chromium/ 

Copper/Manganese/ Seleni/Zn 1 ml/ Insulin Human Regular 30 unit/ Total 

Parenteral Nutrition/Amino Acids/Dextrose/ Fat Emulsion Intravenous 1,920 ml @  

80 mls/hr TPN  CONT IV  Last administered on 6/11/20at 23:23;  Start 6/11/20 at 

22:00;  Stop 6/12/20 at 21:59;  Status DC


Potassium Acetate 40 meq/Magnesium Sulfate 5 meq/ Multivitamins 10 ml/Chromium/ 

Copper/Manganese/ Seleni/Zn 1 ml/ Insulin Human Regular 30 unit/ Total 

Parenteral Nutrition/Amino Acids/Dextrose/ Fat Emulsion Intravenous 1,920 ml @  

80 mls/hr TPN  CONT IV  Last administered on 6/12/20at 21:35;  Start 6/12/20 at 

22:00;  Stop 6/13/20 at 21:59;  Status DC


Furosemide (Lasix) 20 mg 1X  ONCE IVP  Last administered on 6/13/20at 06:26;  

Start 6/13/20 at 06:15;  Stop 6/13/20 at 06:16;  Status DC


Methylprednisolone Sodium Succinate (SOLU-Medrol 125MG VIAL) 125 mg 1X  ONCE IV 

Last administered on 6/13/20at 06:26;  Start 6/13/20 at 06:15;  Stop 6/13/20 at 

06:16;  Status DC


Albuterol/ Ipratropium (Duoneb) 3 ml Q4HRS NEB  Last administered on 7/20/20at 

08:49;  Start 6/13/20 at 08:00


Fentanyl Citrate 30 ml @ 0 mls/hr CONT  PRN IV SEE PROTOCOL Last administered on

7/4/20at 08:03;  Start 6/13/20 at 06:00;  Stop 7/4/20 at 12:42;  Status DC


Propofol 100 ml @ 0 mls/hr CONT  PRN IV SEE PROTOCOL Last administered on 

6/20/20at 23:50;  Start 6/13/20 at 06:00


Fentanyl Citrate (Fentanyl 2ml Vial) 25 mcg PRN Q1HR  PRN IV SEE COMMENTS Last 

administered on 7/19/20at 20:04;  Start 6/13/20 at 06:00


Fentanyl Citrate (Fentanyl 2ml Vial) 50 mcg PRN Q1HR  PRN IV SEE COMMENTS Last 

administered on 7/12/20at 18:02;  Start 6/13/20 at 06:00


Chlorhexidine Gluconate (Peridex) 15 ml BID MM ;  Start 6/13/20 at 09:00;  Stop 

6/13/20 at 07:58;  Status DC


Potassium Acetate 40 meq/Magnesium Sulfate 5 meq/ Multivitamins 10 ml/Chromium/ 

Copper/Manganese/ Seleni/Zn 1 ml/ Insulin Human Regular 30 unit/ Total 

Parenteral Nutrition/Amino Acids/Dextrose/ Fat Emulsion Intravenous 1,920 ml @  

80 mls/hr TPN  CONT IV  Last administered on 6/13/20at 21:19;  Start 6/13/20 at 

22:00;  Stop 6/14/20 at 21:59;  Status DC


Acetylcysteine (Mucomyst 20% Resp Treatment) 600 mg BID NEB  Last administered 

on 6/19/20at 09:33;  Start 6/13/20 at 21:00;  Stop 6/19/20 at 10:39;  Status DC


Magnesium Sulfate 100 ml @  25 mls/hr 1X  ONCE IV  Last administered on 

6/13/20at 15:48;  Start 6/13/20 at 15:45;  Stop 6/13/20 at 19:44;  Status DC


Potassium Acetate 40 meq/Magnesium Sulfate 5 meq/ Multivitamins 10 ml/Chromium/ 

Copper/Manganese/ Seleni/Zn 1 ml/ Insulin Human Regular 30 unit/ Total Pa

renteral Nutrition/Amino Acids/Dextrose/ Fat Emulsion Intravenous 1,920 ml @  80

mls/hr TPN  CONT IV  Last administered on 6/14/20at 21:35;  Start 6/14/20 at 

22:00;  Stop 6/15/20 at 21:59;  Status DC


Potassium Chloride/Water 100 ml @  100 mls/hr Q1H IV  Last administered on 

6/15/20at 08:31;  Start 6/15/20 at 07:00;  Stop 6/15/20 at 08:59;  Status DC


Potassium Acetate 40 meq/Magnesium Sulfate 5 meq/ Multivitamins 10 ml/Chromium/ 

Copper/Manganese/ Seleni/Zn 1 ml/ Insulin Human Regular 30 unit/ Total Parentera

l Nutrition/Amino Acids/Dextrose/ Fat Emulsion Intravenous 1,920 ml @  80 mls/hr

TPN  CONT IV  Last administered on 6/15/20at 21:54;  Start 6/15/20 at 22:00;  

Stop 6/16/20 at 19:34;  Status DC


Lidocaine HCl (Buffered Lidocaine 1%) 3 ml STK-MED ONCE .ROUTE ;  Start 6/15/20 

at 12:14;  Stop 6/15/20 at 12:14;  Status DC


Lidocaine HCl (Buffered Lidocaine 1%) 3 ml 1X  ONCE IJ  Last administered on 

6/15/20at 13:11;  Start 6/15/20 at 13:00;  Stop 6/15/20 at 13:01;  Status DC


Magnesium Sulfate 50 ml @ 25 mls/hr 1X  ONCE IV ;  Start 6/16/20 at 08:15;  Stop

6/16/20 at 10:14;  Status DC


Potassium Acetate 40 meq/Magnesium Sulfate 10 meq/ Multivitamins 10 ml/Chromium/

Copper/Manganese/ Seleni/Zn 1 ml/ Insulin Human Regular 20 unit/ Total 

Parenteral Nutrition/Amino Acids/Dextrose/ Fat Emulsion Intravenous 1,920 ml @  

80 mls/hr TPN  CONT IV  Last administered on 6/16/20at 21:32;  Start 6/16/20 at 

22:00;  Stop 6/17/20 at 21:59;  Status DC


Potassium Chloride/Water 100 ml @  100 mls/hr Q1H IV  Last administered on 

6/17/20at 09:12;  Start 6/17/20 at 08:00;  Stop 6/17/20 at 09:59;  Status DC


Alteplase, Recombinant (Cathflo For Central Catheter Clearance) 4 mg 1X  ONCE 

INT CAT ;  Start 6/17/20 at 09:15;  Stop 6/17/20 at 09:16;  Status UNV


Alteplase, Recombinant (Cathflo For Central Catheter Clearance) 4 mg 1X  ONCE 

INT CAT ;  Start 6/17/20 at 09:15;  Stop 6/17/20 at 09:16;  Status UNV


Alteplase, Recombinant (Cathflo For Central Catheter Clearance) 4 mg 1X  ONCE 

INT CAT ;  Start 6/17/20 at 09:15;  Stop 6/17/20 at 09:16;  Status UNV


Alteplase, Recombinant 4 mg/ Sodium Chloride 20 ml @ 20 mls/hr 1X  ONCE IV  Last

administered on 6/17/20at 10:10;  Start 6/17/20 at 10:00;  Stop 6/17/20 at 

10:59;  Status DC


Alteplase, Recombinant 4 mg/ Sodium Chloride 20 ml @ 20 mls/hr 1X  ONCE IV  Last

administered on 6/17/20at 10:09;  Start 6/17/20 at 10:00;  Stop 6/17/20 at 

10:59;  Status DC


Alteplase, Recombinant 4 mg/ Sodium Chloride 20 ml @ 20 mls/hr 1X  ONCE IV  Last

administered on 6/17/20at 10:09;  Start 6/17/20 at 10:00;  Stop 6/17/20 at 

10:59;  Status DC


Potassium Acetate 60 meq/Magnesium Sulfate 10 meq/ Multivitamins 10 ml/Chromium/

Copper/Manganese/ Seleni/Zn 1 ml/ Insulin Human Regular 20 unit/ Total 

Parenteral Nutrition/Amino Acids/Dextrose/ Fat Emulsion Intravenous 1,920 ml @  

80 mls/hr TPN  CONT IV  Last administered on 6/17/20at 21:55;  Start 6/17/20 at 

22:00;  Stop 6/18/20 at 21:59;  Status DC


Albumin Human 500 ml @  125 mls/hr 1X  ONCE IV  Last administered on 6/18/20at 

12:01;  Start 6/18/20 at 11:15;  Stop 6/18/20 at 15:14;  Status DC


Sodium Chloride 500 ml @  500 mls/hr 1X  ONCE IV  Last administered on 6/18/20at

13:50;  Start 6/18/20 at 11:15;  Stop 6/18/20 at 12:14;  Status DC


Potassium Acetate 60 meq/Magnesium Sulfate 14 meq/ Multivitamins 10 ml/Chromium/

Copper/Manganese/ Seleni/Zn 1 ml/ Insulin Human Regular 20 unit/ Total 

Parenteral Nutrition/Amino Acids/Dextrose/ Fat Emulsion Intravenous 1,920 ml @  

80 mls/hr TPN  CONT IV  Last administered on 6/18/20at 22:26;  Start 6/18/20 at 

22:00;  Stop 6/19/20 at 21:59;  Status DC


Ciprofloxacin/ Dextrose 200 ml @  200 mls/hr Q12HR IV  Last administered on 

6/25/20at 08:27;  Start 6/18/20 at 21:00;  Stop 6/25/20 at 08:56;  Status DC


Albumin Human 250 ml @  62.5 mls/hr 1X  ONCE IV  Last administered on 6/19/20at 

11:09;  Start 6/19/20 at 11:00;  Stop 6/19/20 at 14:59;  Status DC


Furosemide (Lasix) 20 mg 1X  ONCE IVP  Last administered on 6/19/20at 14:52;  

Start 6/19/20 at 10:45;  Stop 6/19/20 at 10:49;  Status DC


Potassium Acetate 60 meq/Magnesium Sulfate 14 meq/ Multivitamins 10 ml/Chromium/

Copper/Manganese/ Seleni/Zn 1 ml/ Insulin Human Regular 15 unit/ Total 

Parenteral Nutrition/Amino Acids/Dextrose/ Fat Emulsion Intravenous 1,920 ml @  

80 mls/hr TPN  CONT IV  Last administered on 6/19/20at 22:08;  Start 6/19/20 at 

22:00;  Stop 6/20/20 at 21:59;  Status DC


Potassium Acetate 60 meq/Magnesium Sulfate 14 meq/ Multivitamins 10 ml/Chromium/

Copper/Manganese/ Seleni/Zn 1 ml/ Insulin Human Regular 15 unit/ Total 

Parenteral Nutrition/Amino Acids/Dextrose/ Fat Emulsion Intravenous 1,920 ml @  

80 mls/hr TPN  CONT IV  Last administered on 6/20/20at 22:12;  Start 6/20/20 at 

22:00;  Stop 6/21/20 at 21:59;  Status DC


Potassium Acetate 60 meq/Magnesium Sulfate 14 meq/ Multivitamins 10 ml/Chromium/

Copper/Manganese/ Seleni/Zn 1 ml/ Insulin Human Regular 15 unit/ Total 

Parenteral Nutrition/Amino Acids/Dextrose/ Fat Emulsion Intravenous 1,920 ml @  

80 mls/hr TPN  CONT IV  Last administered on 6/21/20at 22:22;  Start 6/21/20 at 

22:00;  Stop 6/22/20 at 21:59;  Status DC


Furosemide (Lasix) 20 mg 1X  ONCE IVP  Last administered on 6/22/20at 11:07;  

Start 6/22/20 at 10:30;  Stop 6/22/20 at 10:34;  Status DC


Potassium Acetate 60 meq/Magnesium Sulfate 14 meq/ Multivitamins 10 ml/Chromium/

Copper/Manganese/ Seleni/Zn 1 ml/ Insulin Human Regular 15 unit/ Sodium Chloride

20 meq/Total Parenteral Nutrition/Amino Acids/Dextrose/ Fat Emulsion Intravenous

1,920 ml @  80 mls/hr TPN  CONT IV  Last administered on 6/22/20at 21:54;  Start

6/22/20 at 22:00;  Stop 6/23/20 at 21:59;  Status DC


Potassium Acetate 30 meq/Magnesium Sulfate 14 meq/ Multivitamins 10 ml/Chromium/

Copper/Manganese/ Seleni/Zn 1 ml/ Insulin Human Regular 15 unit/ Sodium Chloride

20 meq/Potassium Chloride 30 meq/ Total Parenteral Nutrition/Amino 

Acids/Dextrose/ Fat Emulsion Intravenous 1,920 ml @  80 mls/hr TPN  CONT IV  

Last administered on 6/23/20at 21:46;  Start 6/23/20 at 22:00;  Stop 6/24/20 at 

21:59;  Status DC


Sodium Chloride 80 meq/Potassium Chloride 30 meq/ Potassium Acetate 30 

meq/Magnesium Sulfate 14 meq/ Multivitamins 10 ml/Chromium/ Copper/Manganese/ 

Seleni/Zn 1 ml/ Insulin Human Regular 15 unit/ Total Parenteral Nutrition/Amino 

Acids/Dextrose/ Fat Emulsion Intravenous 1,920 ml @  80 mls/hr TPN  CONT IV  

Last administered on 6/24/20at 22:33;  Start 6/24/20 at 22:00;  Stop 6/25/20 at 

21:59;  Status DC


Furosemide (Lasix) 40 mg 1X  ONCE IVP  Last administered on 6/24/20at 16:27;  

Start 6/24/20 at 15:30;  Stop 6/24/20 at 15:33;  Status DC


Albumin Human 250 ml @  62.5 mls/hr 1X  ONCE IV  Last administered on 6/24/20at 

16:27;  Start 6/24/20 at 15:30;  Stop 6/24/20 at 19:29;  Status DC


Sodium Chloride 80 meq/Potassium Chloride 30 meq/ Potassium Acetate 30 

meq/Magnesium Sulfate 14 meq/ Multivitamins 10 ml/Chromium/ Copper/Manganese/ 

Seleni/Zn 1 ml/ Insulin Human Regular 15 unit/ Total Parenteral Nutrition/Amino 

Acids/Dextrose/ Fat Emulsion Intravenous 1,920 ml @  80 mls/hr TPN  CONT IV  

Last administered on 6/25/20at 22:25;  Start 6/25/20 at 22:00;  Stop 6/26/20 at 

21:59;  Status DC


Sodium Chloride 80 meq/Potassium Chloride 30 meq/ Potassium Acetate 30 

meq/Magnesium Sulfate 14 meq/ Multivitamins 10 ml/Chromium/ Copper/Manganese/ 

Seleni/Zn 1 ml/ Insulin Human Regular 15 unit/ Total Parenteral Nutrition/Amino 

Acids/Dextrose/ Fat Emulsion Intravenous 1,920 ml @  80 mls/hr TPN  CONT IV  

Last administered on 6/26/20at 21:32;  Start 6/26/20 at 22:00;  Stop 6/27/20 at 

21:59;  Status DC


Sodium Chloride 80 meq/Potassium Chloride 30 meq/ Potassium Acetate 30 

meq/Magnesium Sulfate 14 meq/ Multivitamins 10 ml/Chromium/ Copper/Manganese/ 

Seleni/Zn 1 ml/ Insulin Human Regular 15 unit/ Total Parenteral Nutrition/Amino 

Acids/Dextrose/ Fat Emulsion Intravenous 1,920 ml @  80 mls/hr TPN  CONT IV  

Last administered on 6/27/20at 21:53;  Start 6/27/20 at 22:00;  Stop 6/28/20 at 

21:59;  Status DC


Acetylcysteine (Mucomyst 20% Resp Treatment) 600 mg RTBID NEB  Last administered

on 7/20/20at 08:49;  Start 6/27/20 at 12:00


Sodium Chloride 80 meq/Potassium Chloride 30 meq/ Potassium Acetate 30 

meq/Magnesium Sulfate 14 meq/ Multivitamins 10 ml/Chromium/ Copper/Manganese/ 

Seleni/Zn 1 ml/ Insulin Human Regular 15 unit/ Total Parenteral Nutrition/Amino 

Acids/Dextrose/ Fat Emulsion Intravenous 1,920 ml @  80 mls/hr TPN  CONT IV  L

ast administered on 6/28/20at 22:06;  Start 6/28/20 at 22:00;  Stop 6/29/20 at 

21:59;  Status DC


Meropenem 500 mg/ Sodium Chloride 50 ml @  100 mls/hr Q6HRS IV  Last 

administered on 7/20/20at 05:56;  Start 6/28/20 at 18:00


Daptomycin 500 mg/ Sodium Chloride 50 ml @  100 mls/hr Q24H IV  Last 

administered on 7/6/20at 21:47;  Start 6/28/20 at 19:00;  Stop 7/7/20 at 08:13; 

Status DC


Sodium Chloride 80 meq/Potassium Chloride 30 meq/ Potassium Acetate 30 

meq/Magnesium Sulfate 14 meq/ Multivitamins 10 ml/Chromium/ Copper/Manganese/ 

Seleni/Zn 1 ml/ Insulin Human Regular 15 unit/ Total Parenteral Nutrition/Amino 

Acids/Dextrose/ Fat Emulsion Intravenous 1,920 ml @  80 mls/hr TPN  CONT IV  

Last administered on 6/29/20at 22:09;  Start 6/29/20 at 22:00;  Stop 6/30/20 at 

21:59;  Status DC


Heparin Sodium (Porcine) 1000 unit/Sodium Chloride 1,001 ml @  1,001 mls/hr 1X  

ONCE IRR ;  Start 6/30/20 at 06:00;  Stop 6/30/20 at 06:59;  Status DC


Propofol (Diprivan) 200 mg STK-MED ONCE IV ;  Start 6/30/20 at 07:44;  Stop 

6/30/20 at 07:44;  Status DC


Lidocaine HCl (Lidocaine Pf 2% Vial) 5 ml STK-MED ONCE .ROUTE ;  Start 6/30/20 

at 07:44;  Stop 6/30/20 at 07:44;  Status DC


Fentanyl Citrate (Fentanyl 2ml Vial) 100 mcg STK-MED ONCE .ROUTE ;  Start 

6/30/20 at 07:44;  Stop 6/30/20 at 07:44;  Status DC


Rocuronium Bromide (Zemuron) 100 mg STK-MED ONCE .ROUTE ;  Start 6/30/20 at 

07:44;  Stop 6/30/20 at 07:44;  Status DC


Micafungin Sodium 100 mg/Dextrose 100 ml @  100 mls/hr Q24H IV  Last 

administered on 7/20/20at 08:43;  Start 6/30/20 at 08:30


Bupivacaine HCl/ Epinephrine Bitart (Sensorcain-Epi 0.5%-1:888191 Mpf) 30 ml 

STK-MED ONCE .ROUTE ;  Start 6/30/20 at 08:34;  Stop 6/30/20 at 08:35;  Status 

DC


Iohexol (Omnipaque 300 Mg/ml) 50 ml STK-MED ONCE .ROUTE  Last administered on 

6/30/20at 13:30;  Start 6/30/20 at 08:35;  Stop 6/30/20 at 08:35;  Status DC


Sodium Chloride 80 meq/Potassium Chloride 30 meq/ Potassium Acetate 30 

meq/Magnesium Sulfate 14 meq/ Multivitamins 10 ml/Chromium/ Copper/Manganese/ 

Seleni/Zn 1 ml/ Insulin Human Regular 15 unit/ Total Parenteral Nutrition/Amino 

Acids/Dextrose/ Fat Emulsion Intravenous 1,920 ml @  80 mls/hr TPN  CONT IV  

Last administered on 7/1/20at 01:22;  Start 6/30/20 at 22:00;  Stop 7/1/20 at 

21:59;  Status DC


Phenylephrine HCl (Ken-Synephrine Inj) 10 mg STK-MED ONCE .ROUTE ;  Start 

6/30/20 at 10:15;  Stop 6/30/20 at 10:15;  Status DC


Desflurane (Suprane) 90 ml STK-MED ONCE IH ;  Start 6/30/20 at 10:18;  Stop 

6/30/20 at 10:19;  Status DC


Albumin Human 500 ml @  As Directed STK-MED ONCE IV ;  Start 6/30/20 at 11:06;  

Stop 6/30/20 at 11:06;  Status DC


Vasopressin (Vasostrict) 20 unit STK-MED ONCE .ROUTE ;  Start 6/30/20 at 12:23; 

Stop 6/30/20 at 12:23;  Status DC


Phenylephrine HCl (Ken-Synephrine Inj) 10 mg STK-MED ONCE .ROUTE ;  Start 

6/30/20 at 13:33;  Stop 6/30/20 at 13:33;  Status DC


Phenylephrine HCl (Ken-Synephrine Inj) 10 mg STK-MED ONCE .ROUTE ;  Start 

6/30/20 at 13:33;  Stop 6/30/20 at 13:33;  Status DC


Ondansetron HCl (Zofran) 4 mg STK-MED ONCE .ROUTE ;  Start 6/30/20 at 13:33;  

Stop 6/30/20 at 13:33;  Status DC


Enoxaparin Sodium (Lovenox 40mg Syringe) 40 mg Q24H SQ  Last administered on 

7/20/20at 08:41;  Start 7/1/20 at 08:00


Sodium Chloride (Normal Saline Flush) 3 ml QSHIFT  PRN IV AFTER MEDS AND BLOOD 

DRAWS;  Start 6/30/20 at 14:45


Naloxone HCl (Narcan) 0.4 mg PRN Q2MIN  PRN IV SEE INSTRUCTIONS;  Start 6/30/20 

at 14:45


Sodium Chloride 1,000 ml @  25 mls/hr Q24H IV  Last administered on 7/19/20at 

14:33;  Start 6/30/20 at 14:33


Morphine Sulfate (Morphine Sulfate) 1 mg PRN Q1HR  PRN IV PAIN;  Start 6/30/20 

at 14:45


Midazolam HCl 100 mg/Sodium Chloride 100 ml @ 1 mls/hr CONT  PRN IV SEE I/O 

RECORD Last administered on 7/3/20at 18:48;  Start 6/30/20 at 14:45


Phenylephrine HCl (PHENYLEPHRINE in 0.9% NACL PF) 1 mg STK-MED ONCE IV ;  Start 

6/30/20 at 14:44;  Stop 6/30/20 at 14:45;  Status DC


Ephedrine Sulfate (ePHEDrine PF IN SALINE SYRINGE) 50 mg STK-MED ONCE IV ;  

Start 6/30/20 at 14:45;  Stop 6/30/20 at 14:45;  Status DC


Vasopressin 20 unit/Dextrose 101 ml @  12 mls/hr CONT  PRN IV SEE I/O RECORD 

Last administered on 7/7/20at 04:17;  Start 6/30/20 at 15:30


Sodium Chloride 1,000 ml @  1,000 mls/hr 1X  ONCE IV  Last administered on 6/30/ 20at 15:42;  Start 6/30/20 at 15:45;  Stop 6/30/20 at 16:44;  Status DC


Albumin Human 500 ml @  125 mls/hr 1X  ONCE IV ;  Start 6/30/20 at 16:00;  Stop 

6/30/20 at 19:59;  Status DC


Albumin Human 500 ml @  125 mls/hr PRN Q1HR  PRN IV PER PROTOCOL;  Start 6/30/20

at 15:45


Magnesium Sulfate 50 ml @ 25 mls/hr 1X  ONCE IV  Last administered on 6/30/20at 

17:02;  Start 6/30/20 at 16:30;  Stop 6/30/20 at 18:29;  Status DC


Sodium Bicarbonate (Sodium Bicarb Adult 8.4% Syr) 50 meq STK-MED ONCE .ROUTE ;  

Start 6/30/20 at 16:20;  Stop 6/30/20 at 16:20;  Status DC


Sodium Bicarbonate (Sodium Bicarb Adult 8.4% Syr) 100 meq 1X  ONCE IV  Last 

administered on 6/30/20at 17:07;  Start 6/30/20 at 16:30;  Stop 6/30/20 at 

16:31;  Status DC


Sodium Bicarbonate 150 meq/Dextrose 1,150 ml @  75 mls/hr 1X  ONCE IV  Last 

administered on 6/30/20at 20:02;  Start 6/30/20 at 16:30;  Stop 7/1/20 at 07:49;

 Status DC


Sodium Chloride 80 meq/Potassium Chloride 30 meq/ Potassium Acetate 30 

meq/Magnesium Sulfate 14 meq/ Multivitamins 10 ml/Chromium/ Copper/Manganese/ 

Seleni/Zn 1 ml/ Insulin Human Regular 15 unit/ Total Parenteral Nutrition/Amino 

Acids/Dextrose/ Fat Emulsion Intravenous 1,920 ml @  80 mls/hr TPN  CONT IV  

Last administered on 7/1/20at 23:05;  Start 7/1/20 at 22:00;  Stop 7/2/20 at 21:

59;  Status DC


Sodium Chloride 100 meq/Potassium Chloride 30 meq/ Potassium Acetate 30 

meq/Magnesium Sulfate 12 meq/ Multivitamins 10 ml/Chromium/ Copper/Manganese/ 

Seleni/Zn 1 ml/ Insulin Human Regular 15 unit/ Total Parenteral Nutrition/Amino 

Acids/Dextrose/ Fat Emulsion Intravenous 1,920 ml @  80 mls/hr TPN  CONT IV  

Last administered on 7/2/20at 21:52;  Start 7/2/20 at 22:00;  Stop 7/3/20 at 

21:59;  Status DC


Sodium Chloride 100 meq/Potassium Chloride 30 meq/ Potassium Acetate 30 

meq/Magnesium Sulfate 12 meq/ Multivitamins 10 ml/Chromium/ Copper/Manganese/ 

Seleni/Zn 1 ml/ Insulin Human Regular 15 unit/ Total Parenteral Nutrition/Amino 

Acids/Dextrose/ Fat Emulsion Intravenous 1,920 ml @  80 mls/hr TPN  CONT IV  

Last administered on 7/3/20at 21:46;  Start 7/3/20 at 22:00;  Stop 7/4/20 at 

21:59;  Status DC


Sodium Chloride 100 meq/Potassium Chloride 30 meq/ Potassium Acetate 30 meq/M

agnesium Sulfate 12 meq/ Multivitamins 10 ml/Chromium/ Copper/Manganese/ 

Seleni/Zn 1 ml/ Insulin Human Regular 15 unit/ Total Parenteral Nutrition/Amino 

Acids/Dextrose/ Fat Emulsion Intravenous 1,800 ml @  75 mls/hr TPN  CONT IV  

Last administered on 7/4/20at 22:04;  Start 7/4/20 at 22:00;  Stop 7/5/20 at 

21:59;  Status DC


Fentanyl Citrate 55 ml @ 0 mls/hr CONT  PRN IV SEE COMMENTS Last administered on

7/6/20at 23:55;  Start 7/4/20 at 13:00;  Stop 7/9/20 at 17:28;  Status DC


Sodium Chloride 100 meq/Potassium Chloride 30 meq/ Potassium Acetate 30 

meq/Magnesium Sulfate 12 meq/ Multivitamins 10 ml/Chromium/ Copper/Manganese/ 

Seleni/Zn 1 ml/ Insulin Human Regular 15 unit/ Total Parenteral Nutrition/Amino 

Acids/Dextrose/ Fat Emulsion Intravenous 1,680 ml @  70 mls/hr TPN  CONT IV  

Last administered on 7/5/20at 21:23;  Start 7/5/20 at 22:00;  Stop 7/6/20 at 

21:59;  Status DC


Sodium Chloride 110 meq/Potassium Chloride 30 meq/ Potassium Acetate 30 

meq/Magnesium Sulfate 15 meq/ Multivitamins 10 ml/Chromium/ Copper/Manganese/ 

Seleni/Zn 1 ml/ Insulin Human Regular 15 unit/ Total Parenteral Nutrition/Amino 

Acids/Dextrose/ Fat Emulsion Intravenous 1,680 ml @  70 mls/hr TPN  CONT IV  

Last administered on 7/6/20at 21:48;  Start 7/6/20 at 22:00;  Stop 7/7/20 at 

21:59;  Status DC


Sodium Chloride 110 meq/Potassium Chloride 30 meq/ Potassium Acetate 30 

meq/Magnesium Sulfate 15 meq/ Multivitamins 10 ml/Chromium/ Copper/Manganese/ 

Seleni/Zn 1 ml/ Insulin Human Regular 15 unit/ Total Parenteral Nutrition/Amino 

Acids/Dextrose/ Fat Emulsion Intravenous 1,680 ml @  70 mls/hr TPN  CONT IV  

Last administered on 7/7/20at 21:33;  Start 7/7/20 at 22:00;  Stop 7/8/20 at 

21:59;  Status DC


Sodium Chloride 110 meq/Potassium Chloride 30 meq/ Potassium Acetate 30 

meq/Magnesium Sulfate 15 meq/ Multivitamins 10 ml/Chromium/ Copper/Manganese/ 

Seleni/Zn 1 ml/ Insulin Human Regular 15 unit/ Total Parenteral Nutrition/Amino 

Acids/Dextrose/ Fat Emulsion Intravenous 1,680 ml @  70 mls/hr TPN  CONT IV  

Last administered on 7/8/20at 21:51;  Start 7/8/20 at 22:00;  Stop 7/9/20 at 

21:59;  Status DC


Sodium Chloride 90 meq/Potassium Chloride 30 meq/ Potassium Acetate 30 

meq/Magnesium Sulfate 15 meq/ Multivitamins 10 ml/Chromium/ Copper/Manganese/ 

Seleni/Zn 1 ml/ Insulin Human Regular 15 unit/ Total Parenteral Nutrition/Amino 

Acids/Dextrose/ Fat Emulsion Intravenous 1,680 ml @  70 mls/hr TPN  CONT IV  

Last administered on 7/9/20at 22:38;  Start 7/9/20 at 22:00;  Stop 7/10/20 at 

21:59;  Status DC


Fentanyl Citrate 30 ml @ 0 mls/hr CONT  PRN IV SEE I/O RECORD;  Start 7/9/20 at 

17:30


Fentanyl (Duragesic 12mcg/ Hr Patch) 1 patch Q3DAYS TD  Last administered on 

7/19/20at 09:00;  Start 7/10/20 at 09:00


Sodium Chloride 90 meq/Potassium Chloride 30 meq/ Potassium Acetate 30 

meq/Magnesium Sulfate 15 meq/ Multivitamins 10 ml/Chromium/ Copper/Manganese/ 

Seleni/Zn 1 ml/ Insulin Human Regular 15 unit/ Total Parenteral Nutrition/Amino 

Acids/Dextrose/ Fat Emulsion Intravenous 1,680 ml @  70 mls/hr TPN  CONT IV  

Last administered on 7/10/20at 21:59;  Start 7/10/20 at 22:00;  Stop 7/11/20 at 

21:59;  Status DC


Sodium Chloride 90 meq/Potassium Chloride 30 meq/ Potassium Acetate 30 

meq/Magnesium Sulfate 15 meq/ Multivitamins 10 ml/Chromium/ Copper/Manganese/ 

Seleni/Zn 1 ml/ Insulin Human Regular 15 unit/ Total Parenteral Nutrition/Amino 

Acids/Dextrose/ Fat Emulsion Intravenous 1,680 ml @  70 mls/hr TPN  CONT IV  

Last administered on 7/11/20at 21:35;  Start 7/11/20 at 22:00;  Stop 7/12/20 at 

21:59;  Status DC


Vancomycin HCl (Vanco Per Pharmacy) 1 each PRN DAILY  PRN MC SEE COMMENTS Last 

administered on 7/14/20at 02:46;  Start 7/12/20 at 09:15;  Stop 7/15/20 at 

07:41;  Status DC


Ciprofloxacin/ Dextrose 200 ml @  200 mls/hr Q12HR IV  Last administered on 

7/20/20at 08:42;  Start 7/12/20 at 10:00


Vancomycin HCl 2 gm/Sodium Chloride 500 ml @  250 mls/hr 1X  ONCE IV  Last 

administered on 7/12/20at 10:34;  Start 7/12/20 at 10:00;  Stop 7/12/20 at 

11:59;  Status DC


Sodium Chloride 90 meq/Potassium Chloride 30 meq/ Potassium Acetate 30 

meq/Magnesium Sulfate 15 meq/ Multivitamins 10 ml/Chromium/ Copper/Manganese/ 

Seleni/Zn 1 ml/ Insulin Human Regular 15 unit/ Total Parenteral Nutrition/Amino 

Acids/Dextrose/ Fat Emulsion Intravenous 1,680 ml @  70 mls/hr TPN  CONT IV  

Last administered on 7/12/20at 22:02;  Start 7/12/20 at 22:00;  Stop 7/13/20 at 

21:59;  Status DC


Diphenhydramine HCl (Benadryl) 25 mg 1X  ONCE IVP  Last administered on 

7/12/20at 14:26;  Start 7/12/20 at 14:30;  Stop 7/12/20 at 14:31;  Status DC


Vancomycin HCl 1.5 gm/Sodium Chloride 500 ml @  250 mls/hr Q8H IV  Last 

administered on 7/13/20at 03:08;  Start 7/12/20 at 18:30;  Stop 7/13/20 at 

12:24;  Status DC


Vancomycin HCl (Vancomycin Trough Level) 1 each 1X  ONCE MC  Last administered 

on 7/13/20at 10:00;  Start 7/13/20 at 10:00;  Stop 7/13/20 at 10:01;  Status DC


Sodium Chloride 90 meq/Potassium Chloride 30 meq/ Potassium Acetate 30 

meq/Magnesium Sulfate 15 meq/ Multivitamins 10 ml/Chromium/ Copper/Manganese/ 

Seleni/Zn 1 ml/ Insulin Human Regular 15 unit/ Total Parenteral Nutrition/Amino 

Acids/Dextrose/ Fat Emulsion Intravenous 1,680 ml @  70 mls/hr TPN  CONT IV  

Last administered on 7/13/20at 22:13;  Start 7/13/20 at 22:00;  Stop 7/14/20 at 

21:59;  Status DC


Vancomycin HCl (Vancomycin Random Level) 1 each 1X  ONCE MC  Last administered 

on 7/14/20at 01:00;  Start 7/14/20 at 01:00;  Stop 7/14/20 at 01:01;  Status DC


Vancomycin HCl 1.5 gm/Sodium Chloride 500 ml @  250 mls/hr Q12H IV  Last 

administered on 7/14/20at 22:07;  Start 7/14/20 at 10:00;  Stop 7/15/20 at 

07:41;  Status DC


Vancomycin HCl (Vancomycin Trough Level) 1 each 1X  ONCE MC ;  Start 7/15/20 at 

09:30;  Stop 7/15/20 at 09:31;  Status Cancel


Sodium Chloride 90 meq/Potassium Chloride 30 meq/ Potassium Acetate 30 

meq/Magnesium Sulfate 15 meq/ Multivitamins 10 ml/Chromium/ Copper/Manganese/ 

Seleni/Zn 1 ml/ Insulin Human Regular 15 unit/ Total Parenteral Nutrition/Amino 

Acids/Dextrose/ Fat Emulsion Intravenous 1,680 ml @  70 mls/hr TPN  CONT IV  

Last administered on 7/14/20at 22:08;  Start 7/14/20 at 22:00;  Stop 7/15/20 at 

21:59;  Status DC


Alteplase, Recombinant (Cathflo For Central Catheter Clearance) 1 mg 1X  ONCE 

INT CAT  Last administered on 7/14/20at 11:49;  Start 7/14/20 at 11:00;  Stop 

7/14/20 at 11:01;  Status DC


Daptomycin 500 mg/ Sodium Chloride 50 ml @  100 mls/hr Q24H IV  Last 

administered on 7/20/20at 09:14;  Start 7/15/20 at 09:00


Sodium Chloride 90 meq/Potassium Chloride 30 meq/ Potassium Acetate 30 

meq/Magnesium Sulfate 15 meq/ Multivitamins 10 ml/Chromium/ Copper/Manganese/ 

Seleni/Zn 1 ml/ Insulin Human Regular 15 unit/ Total Parenteral Nutrition/Amino 

Acids/Dextrose/ Fat Emulsion Intravenous 1,680 ml @  70 mls/hr TPN  CONT IV  

Last administered on 7/15/20at 22:55;  Start 7/15/20 at 22:00;  Stop 7/16/20 at 

21:59;  Status DC


Sodium Chloride 90 meq/Potassium Chloride 30 meq/ Potassium Acetate 30 

meq/Magnesium Sulfate 15 meq/ Multivitamins 10 ml/Chromium/ Copper/Manganese/ 

Seleni/Zn 1 ml/ Insulin Human Regular 15 unit/ Total Parenteral Nutrition/Amino 

Acids/Dextrose/ Fat Emulsion Intravenous 1,680 ml @  70 mls/hr TPN  CONT IV  

Last administered on 7/16/20at 22:06;  Start 7/16/20 at 22:00;  Stop 7/17/20 at 

21:59;  Status DC


Diphenhydramine HCl (Benadryl) 50 mg STK-MED ONCE .ROUTE ;  Start 7/16/20 at 

18:34;  Stop 7/16/20 at 18:35;  Status DC


Diphenhydramine HCl (Benadryl) 25 mg 1X  ONCE IM ;  Start 7/16/20 at 18:45;  

Stop 7/16/20 at 18:46;  Status DC


Diphenhydramine HCl (Benadryl) 25 mg 1X  ONCE IVP  Last administered on 

7/16/20at 18:56;  Start 7/16/20 at 19:00;  Stop 7/16/20 at 19:01;  Status DC


Alprazolam (Xanax) 0.5 mg PRN TID  PRN PO ANXIETY / AGITATION Last administered 

on 7/20/20at 08:41;  Start 7/17/20 at 08:00


Sodium Chloride 110 meq/Potassium Chloride 30 meq/ Potassium Acetate 30 

meq/Magnesium Sulfate 15 meq/ Multivitamins 10 ml/Chromium/ Copper/Manganese/ 

Seleni/Zn 1 ml/ Insulin Human Regular 15 unit/ Total Parenteral Nutrition/Amino 

Acids/Dextrose/ Fat Emulsion Intravenous 1,680 ml @  70 mls/hr TPN  CONT IV  

Last administered on 7/17/20at 21:21;  Start 7/17/20 at 22:00;  Stop 7/18/20 at 

21:59;  Status DC


Sodium Chloride 110 meq/Potassium Chloride 30 meq/ Potassium Acetate 30 

meq/Magnesium Sulfate 15 meq/ Multivitamins 10 ml/Chromium/ Copper/Manganese/ 

Seleni/Zn 1 ml/ Insulin Human Regular 15 unit/ Total Parenteral Nutrition/Amino 

Acids/Dextrose/ Fat Emulsion Intravenous 1,680 ml @  70 mls/hr TPN  CONT IV  

Last administered on 7/18/20at 22:01;  Start 7/18/20 at 22:00;  Stop 7/19/20 at 

21:59;  Status DC


Alteplase, Recombinant (Cathflo For Central Catheter Clearance) 1 mg 1X  ONCE 

INT CAT  Last administered on 7/19/20at 08:23;  Start 7/19/20 at 08:15;  Stop 

7/19/20 at 08:16;  Status DC


Sodium Chloride 110 meq/Sodium Phosphate 10 mmol/ Potassium Chloride 30 meq/ 

Potassium Acetate 30 meq/Magnesium Sulfate 15 meq/ Multivitamins 10 ml/Chromium/

Copper/Manganese/ Seleni/Zn 1 ml/ Insulin Human Regular 15 unit/ Total 

Parenteral Nutrition/Amino Acids/Dextrose/ Fat Emulsion Intravenous 1,680 ml @  

70 mls/hr TPN  CONT IV  Last administered on 7/19/20at 22:03;  Start 7/19/20 at 

22:00;  Stop 7/20/20 at 21:59





Active Scripts


Active


Reported


Bisoprolol Fumarate 5 Mg Tablet 10 Mg PO DAILY


Vitals/I & O





Vital Sign - Last 24 Hours








 7/19/20 7/19/20 7/19/20 7/19/20





 12:00 12:00 12:27 13:00


 


Temp  98.9  





  98.9  


 


Pulse  109  117


 


Resp  29  33


 


B/P (MAP)  128/80 (96)  138/88 (105)


 


Pulse Ox  100 100 100


 


O2 Delivery Trach Collar Tracheal Collar Tracheal Collar Tracheal Collar


 


O2 Flow Rate 10.0 10.0 8.0 10.0


 


    





    





 7/19/20 7/19/20 7/19/20 7/19/20





 13:15 13:55 14:00 14:39


 


Pulse   113 


 


Resp  34 24 24


 


B/P (MAP)   128/98 (108) 


 


Pulse Ox 100 100  100


 


O2 Delivery  Tracheal Collar  Tracheal Collar


 


O2 Flow Rate 8.0 10.0  10.0





 7/19/20 7/19/20 7/19/20 7/19/20





 15:00 16:00 16:00 16:28


 


Temp   99.0 





   99.0 


 


Pulse 114  122 


 


Resp 22  24 


 


B/P (MAP) 135/88 (104)  137/81 (99) 


 


Pulse Ox 100  100 100


 


O2 Delivery Tracheal Collar Trach Collar Tracheal Collar Tracheal Collar


 


O2 Flow Rate 10.0 10.0 10.0 8.0


 


    





    





 7/19/20 7/19/20 7/19/20 7/19/20





 17:00 18:00 19:00 20:00


 


Pulse 121 129 126 


 


Resp 27 32 30 


 


B/P (MAP) 138/90 (106) 146/93 (110) 135/82 (99) 


 


Pulse Ox 10 10 100 


 


O2 Delivery Tracheal Collar Tracheal Collar Tracheal Collar Trach Collar


 


O2 Flow Rate    10.0





 7/19/20 7/19/20 7/19/20 7/19/20





 20:00 20:03 20:04 20:04


 


Temp 98.5   





 98.5   


 


Pulse 127   


 


Resp 28  31 


 


B/P (MAP) 140/81 (100)   


 


Pulse Ox 100 100 100 100


 


O2 Delivery Tracheal Collar Tracheal Collar Tracheal Collar Tracheal Collar


 


O2 Flow Rate  8.0  8.0


 


    





    





 7/19/20 7/19/20 7/19/20 7/19/20





 20:34 21:00 22:00 23:00


 


Pulse  128 125 123


 


Resp 31 32 28 28


 


B/P (MAP)  134/97 (109) 124/76 (92) 128/76 (93)


 


Pulse Ox 100 100 100 100


 


O2 Delivery Tracheal Collar Tracheal Collar Tracheal Collar Tracheal Collar





 7/20/20 7/20/20 7/20/20 7/20/20





 00:00 00:00 01:00 02:00


 


Temp 99.0   





 99.0   


 


Pulse 124  126 127


 


Resp 30  29 31


 


B/P (MAP) 135/74 (94)  135/76 (95) 111/74 (86)


 


Pulse Ox 100  100 100


 


O2 Delivery Tracheal Collar Trach Collar Tracheal Collar Tracheal Collar


 


O2 Flow Rate  10.0  


 


    





    





 7/20/20 7/20/20 7/20/20 7/20/20





 03:00 04:00 04:00 05:00


 


Temp   99.8 





   99.8 


 


Pulse 127  127 129


 


Resp 26  28 30


 


B/P (MAP) 122/70 (87)  113/77 (89) 120/76 (91)


 


Pulse Ox 98  100 100


 


O2 Delivery Tracheal Collar Trach Collar Tracheal Collar Tracheal Collar


 


O2 Flow Rate  10.0  


 


    





    





 7/20/20 7/20/20  





 06:00 08:49  


 


Pulse 128   


 


Resp 28   


 


B/P (MAP) 131/77 (95)   


 


Pulse Ox 99 99  


 


O2 Delivery Tracheal Collar Tracheal Collar  


 


O2 Flow Rate  8.0  














Intake and Output   


 


 7/19/20 7/19/20 7/20/20





 15:00 23:00 07:00


 


Intake Total 1261.1 ml 233.3 ml 915 ml


 


Output Total 535 ml 1225 ml 760 ml


 


Balance 726.1 ml -991.7 ml 155 ml











Justicifation of Admission Dx:


Justifications for Admission:


Justification of Admission Dx:  Yes











CLEMENTINA PANTOJA MD           Jul 20, 2020 11:20

## 2020-07-21 VITALS — DIASTOLIC BLOOD PRESSURE: 75 MMHG | SYSTOLIC BLOOD PRESSURE: 121 MMHG

## 2020-07-21 VITALS — DIASTOLIC BLOOD PRESSURE: 77 MMHG | SYSTOLIC BLOOD PRESSURE: 123 MMHG

## 2020-07-21 VITALS — SYSTOLIC BLOOD PRESSURE: 125 MMHG | DIASTOLIC BLOOD PRESSURE: 77 MMHG

## 2020-07-21 VITALS — DIASTOLIC BLOOD PRESSURE: 76 MMHG | SYSTOLIC BLOOD PRESSURE: 121 MMHG

## 2020-07-21 VITALS — SYSTOLIC BLOOD PRESSURE: 111 MMHG | DIASTOLIC BLOOD PRESSURE: 73 MMHG

## 2020-07-21 VITALS — SYSTOLIC BLOOD PRESSURE: 113 MMHG | DIASTOLIC BLOOD PRESSURE: 75 MMHG

## 2020-07-21 VITALS — SYSTOLIC BLOOD PRESSURE: 127 MMHG | DIASTOLIC BLOOD PRESSURE: 76 MMHG

## 2020-07-21 VITALS — SYSTOLIC BLOOD PRESSURE: 119 MMHG | DIASTOLIC BLOOD PRESSURE: 66 MMHG

## 2020-07-21 VITALS — SYSTOLIC BLOOD PRESSURE: 118 MMHG | DIASTOLIC BLOOD PRESSURE: 71 MMHG

## 2020-07-21 VITALS — DIASTOLIC BLOOD PRESSURE: 83 MMHG | SYSTOLIC BLOOD PRESSURE: 128 MMHG

## 2020-07-21 VITALS — DIASTOLIC BLOOD PRESSURE: 91 MMHG | SYSTOLIC BLOOD PRESSURE: 114 MMHG

## 2020-07-21 VITALS — DIASTOLIC BLOOD PRESSURE: 65 MMHG | SYSTOLIC BLOOD PRESSURE: 106 MMHG

## 2020-07-21 VITALS — SYSTOLIC BLOOD PRESSURE: 112 MMHG | DIASTOLIC BLOOD PRESSURE: 77 MMHG

## 2020-07-21 VITALS — DIASTOLIC BLOOD PRESSURE: 71 MMHG | SYSTOLIC BLOOD PRESSURE: 108 MMHG

## 2020-07-21 VITALS — SYSTOLIC BLOOD PRESSURE: 113 MMHG | DIASTOLIC BLOOD PRESSURE: 72 MMHG

## 2020-07-21 RX ADMIN — ENOXAPARIN SODIUM SCH MG: 40 INJECTION SUBCUTANEOUS at 09:30

## 2020-07-21 RX ADMIN — PANTOPRAZOLE SODIUM SCH MG: 40 INJECTION, POWDER, FOR SOLUTION INTRAVENOUS at 09:30

## 2020-07-21 RX ADMIN — ACETYLCYSTEINE SCH MG: 200 INHALANT RESPIRATORY (INHALATION) at 19:41

## 2020-07-21 RX ADMIN — IPRATROPIUM BROMIDE AND ALBUTEROL SULFATE SCH ML: .5; 3 SOLUTION RESPIRATORY (INHALATION) at 15:50

## 2020-07-21 RX ADMIN — IPRATROPIUM BROMIDE AND ALBUTEROL SULFATE SCH ML: .5; 3 SOLUTION RESPIRATORY (INHALATION) at 12:34

## 2020-07-21 RX ADMIN — ACETYLCYSTEINE SCH MG: 200 INHALANT RESPIRATORY (INHALATION) at 08:00

## 2020-07-21 RX ADMIN — BACITRACIN SCH MLS/HR: 5000 INJECTION, POWDER, FOR SOLUTION INTRAMUSCULAR at 14:54

## 2020-07-21 RX ADMIN — IPRATROPIUM BROMIDE AND ALBUTEROL SULFATE SCH ML: .5; 3 SOLUTION RESPIRATORY (INHALATION) at 08:27

## 2020-07-21 RX ADMIN — CEFTAZIDIME, AVIBACTAM SCH MLS/HR: 2; .5 POWDER, FOR SOLUTION INTRAVENOUS at 22:09

## 2020-07-21 RX ADMIN — DEXTROSE SCH MLS/HR: 50 INJECTION, SOLUTION INTRAVENOUS at 09:31

## 2020-07-21 RX ADMIN — INSULIN LISPRO SCH UNITS: 100 INJECTION, SOLUTION INTRAVENOUS; SUBCUTANEOUS at 18:00

## 2020-07-21 RX ADMIN — INSULIN LISPRO SCH UNITS: 100 INJECTION, SOLUTION INTRAVENOUS; SUBCUTANEOUS at 00:00

## 2020-07-21 RX ADMIN — IPRATROPIUM BROMIDE AND ALBUTEROL SULFATE SCH ML: .5; 3 SOLUTION RESPIRATORY (INHALATION) at 23:07

## 2020-07-21 RX ADMIN — FENTANYL CITRATE PRN MCG: 50 INJECTION INTRAMUSCULAR; INTRAVENOUS at 14:50

## 2020-07-21 RX ADMIN — DAPTOMYCIN SCH MLS/HR: 500 INJECTION, POWDER, LYOPHILIZED, FOR SOLUTION INTRAVENOUS at 09:31

## 2020-07-21 RX ADMIN — FENTANYL CITRATE PRN MCG: 50 INJECTION INTRAMUSCULAR; INTRAVENOUS at 16:41

## 2020-07-21 RX ADMIN — IPRATROPIUM BROMIDE AND ALBUTEROL SULFATE SCH ML: .5; 3 SOLUTION RESPIRATORY (INHALATION) at 19:41

## 2020-07-21 RX ADMIN — MEROPENEM SCH MLS/HR: 500 INJECTION, POWDER, FOR SOLUTION INTRAVENOUS at 00:06

## 2020-07-21 RX ADMIN — FENTANYL CITRATE PRN MCG: 50 INJECTION INTRAMUSCULAR; INTRAVENOUS at 12:18

## 2020-07-21 RX ADMIN — ACETAMINOPHEN PRN MG: 650 SUPPOSITORY RECTAL at 13:03

## 2020-07-21 RX ADMIN — Medication PRN EACH: at 11:08

## 2020-07-21 RX ADMIN — INSULIN LISPRO SCH UNITS: 100 INJECTION, SOLUTION INTRAVENOUS; SUBCUTANEOUS at 12:00

## 2020-07-21 RX ADMIN — IPRATROPIUM BROMIDE AND ALBUTEROL SULFATE SCH ML: .5; 3 SOLUTION RESPIRATORY (INHALATION) at 03:46

## 2020-07-21 RX ADMIN — INSULIN LISPRO SCH UNITS: 100 INJECTION, SOLUTION INTRAVENOUS; SUBCUTANEOUS at 06:00

## 2020-07-21 RX ADMIN — CEFTAZIDIME, AVIBACTAM SCH MLS/HR: 2; .5 POWDER, FOR SOLUTION INTRAVENOUS at 14:55

## 2020-07-21 RX ADMIN — MEROPENEM SCH MLS/HR: 500 INJECTION, POWDER, FOR SOLUTION INTRAVENOUS at 06:15

## 2020-07-21 NOTE — PDOC
[facility] PROGRESS NOTE


Chief Complaint


Chief Complaint


Postop day 21 (Exploratory laparotomy, lysis of adhesions, subtotal 

cholecystectomy with cholangiogram, gastrojejunostomy tube placement, pancreatic

necrosectomy)


Acute hypoxic Respiratory failure required  mechanical ventilation


Tracheostomy


bilateral pleural effusions/pulm edema s/p Throacentesis on 6/15/2020


Severe Acute gallstone pancreatitis (not a surgical candidate at this time) with

necrosis


Acute kidney failure now requiring dialysis


Gallstones (Calculus of gallbladder with acute cholecystitis without 

obstruction)


HTN 


Intractable pain


Intractable nausea


Covid 19 negative. 


Acute on chronic anemia 


EEG: No seizure activity


Fever  - intermittent 


? Ileus with vomiting


Abd distention - U/S and CT reviewed s/p 0.4 L of opaque, debris-containing 

ascites was removed 5/6


Acute pancreatitis with persistent necrosis


Gallstone pancreatitis with necrosis. 


   -CT A/P 6/6 showed multiple pseudocysts, slight larger on the right. s/p 

drains x 3, 6/7.  + PSAE (MDRO-R Cefepime, Zosyn ANSON < 64) and yeast, 


   -s/p drain 4/27. C. parapsilosis. s/p drain 5/6 + yeast & high amylase; s/p 

additional drain on 5/8. Drains removed. 


Ascites s/p paracentesis 4/15 & 5/6. C. parapsilosis 


JED. off HD. 


A large fluid collection in the pancreatic bed has slightly decreased in size, d

escribed below, the pancreas itself is difficult to  visualize, which could be 

due to necrosis or obscuration of pancreatic  parenchyma from the surrounding 

fluid collection.6/15 


- 4/27 status post ROBERT drain placement + C paropsilosis. s/p additional drains 

5/8


Anemia - S/p PRBCs. 


Cholelithiasis with thickening of the gallbladder wall.


Leucocytosis improving


JED, hyperkalemia, Metabolic acidosis off dialysis


hypocalcemia 


Prediabetes


HTN


s/p trach


Hyperglycemia


severe protein-caloric malnutrition


Moderate to large left pleural effusion with atelectasis and collapse  of most 

of the left lower lobe, stable





History of Present Illness


History of Present Illness


7/21/2020


Patient seen and examined in the ICU


She is still extremely critically ill


Appears extremely weak frail and pale


Discussed with RN


Chart reviewed





Vitals/I&O


Vitals/I&O:





                                   Vital Signs








  Date Time  Temp Pulse Resp B/P (MAP) Pulse Ox O2 Delivery O2 Flow Rate FiO2


 


7/21/20 13:03   36   Nasal Cannula 2.0 


 


7/21/20 12:35     100   


 


7/21/20 12:00 100.8 131  128/83 (98)    





 100.8       














                                    I & O   


 


 7/20/20 7/20/20 7/21/20





 15:00 23:00 07:00


 


Intake Total 400 ml 1117 ml 1024 ml


 


Output Total 250 ml 1449 ml 830 ml


 


Balance 150 ml -332 ml 194 ml











Physical Exam


Physical Exam:


GENERAL: laying in bed, resting quietly 


HEENT: Pupils equal, oral cavity dry. NGT out, 


NECK:  Tracheostomy 


LUNGS: Diminished aeration bases,  CT on left 


HEART:  S1, S2, regular, tachy 110s 


ABDOMEN: Distended, bowel sounds hypoactive, soft, richadrson x 2, 3 ROBERT drains, G-J 

tube and + wound vac 


: [place] in place 


EXTREMITIES: Trace generalized edema, no cyanosis. MARTHA hose bilaterally, 


SKIN: warm touch. No signs of rash.  


NEURO: Did not awaken 


LUE-PICC without signs of complications


General:  Alert, Cooperative


Heart:  Regular rate (SR/ST), Other (distant heart sounds)


Lungs:  Crackles, Other (l ct)


Abdomen:  Soft, No tenderness, Other (drains in place, min output on some)


Extremities:  Other (Diffuse edema)


Skin:  No rashes, No significant lesion





Labs


Labs:





Laboratory Tests








Test


 7/20/20


14:39 7/20/20


18:03 7/20/20


23:59 7/21/20


05:22


 


Glucose (Fingerstick)


 151 mg/dL


(70-99) 128 mg/dL


(70-99) 138 mg/dL


(70-99) 141 mg/dL


(70-99)


 


Test


 7/21/20


12:21 


 


 





 


Glucose (Fingerstick)


 143 mg/dL


(70-99) 


 


 














Assessment and Plan


Assessmemt and Plan


Problems


Medical Problems:


(1) Acute pancreatitis


Status: Acute  





(2) Cholelithiasis


Status: Acute  





Assessment:


Postop day 21 (Exploratory laparotomy, lysis of adhesions, subtotal 

cholecystectomy with cholangiogram, gastrojejunostomy tube placement, pancreatic

necrosectomy)


Acute hypoxic Respiratory failure required  mechanical ventilation


Tracheostomy


bilateral pleural effusions/pulm edema s/p Throacentesis on 6/15/2020


Severe Acute gallstone pancreatitis (not a surgical candidate at this time) with

necrosis


Acute kidney failure now requiring dialysis


Gallstones (Calculus of gallbladder with acute cholecystitis without 

obstruction)


HTN 


Intractable pain


Intractable nausea


Covid 19 negative. 


Acute on chronic anemia 


EEG: No seizure activity


Fever  - intermittent 


? Ileus with vomiting


Abd distention - U/S and CT reviewed s/p 0.4 L of opaque, debris-containing 

ascites was removed 5/6


Acute pancreatitis with persistent necrosis


Gallstone pancreatitis with necrosis. 


   -CT A/P 6/6 showed multiple pseudocysts, slight larger on the right. s/p 

drains x 3, 6/7.  + PSAE (MDRO-R Cefepime, Zosyn ANSON < 64) and yeast, 


   -s/p drain 4/27. C. parapsilosis. s/p drain 5/6 + yeast & high amylase; s/p 

additional drain on 5/8. Drains removed. 


Ascites s/p paracentesis 4/15 & 5/6. C. parapsilosis 


JED. off HD. 


A large fluid collection in the pancreatic bed has slightly decreased in size, 

described below, the pancreas itself is difficult to  visualize, which could be 

due to necrosis or obscuration of pancreatic  parenchyma from the surrounding 

fluid collection.6/15 


- 4/27 status post ROBERT drain placement + C paropsilosis. s/p additional drains 

5/8


Anemia - S/p PRBCs. 


Cholelithiasis with thickening of the gallbladder wall.


Leucocytosis improving


JED, hyperkalemia, Metabolic acidosis off dialysis


hypocalcemia 


Prediabetes


HTN


s/p trach


Hyperglycemia


severe protein-caloric malnutrition


Moderate to large left pleural effusion with atelectasis and collapse  of most 

of the left lower lobe, stable


 





Plan:


ICU monitoring


TPN


Aggressive wound care


Trend labs


follow cultures. 


meropenam, cipro, micafungin per ID 


PRN trach shield


ID, gen sx, GI, pulm following 


DVT GI prophylaxis


Continue TPN for nutrition support 


poor prognosis


follow labs


xanax for anxiety prn


Appreciate subspecialist input


Long-term prognosis extremely guarded








Comment


Review of Relevant


I have reviewed the following items josy (where applicable) has been applied.


Medications:





Current Medications








 Medications


  (Trade)  Dose


 Ordered  Sig/Yee


 Route


 PRN Reason  Start Time


 Stop Time Status Last Admin


Dose Admin


 


 Sodium Chloride


 120 meq/Sodium


 Phosphate 10 mmol/


 Potassium


 Chloride 30 meq/


 Potassium Acetate


 30 meq/Magnesium


 Sulfate 15 meq/


 Multivitamins 10


 ml/Chromium/


 Copper/Manganese/


 Seleni/Zn 1 ml/


 Insulin Human


 Regular 15 unit/


 Total Parenteral


 Nutrition/Amino


 Acids/Dextrose/


 Fat Emulsion


 Intravenous  1,680 ml @ 


 70 mls/hr  TPN  CONT


 IV


   7/20/20 22:00


 7/21/20 21:59  7/20/20 22:08





 


 Alteplase,


 Recombinant


  (Cathflo For


 Central Catheter


 Clearance)  1 mg  1X  ONCE


 INT CAT


   7/21/20 09:30


 7/21/20 09:31 DC 7/21/20 09:30





 


 Alteplase,


 Recombinant


  (Cathflo For


 Central Catheter


 Clearance)  1 mg  1X  ONCE


 INT CAT


   7/21/20 12:00


 7/21/20 12:01 DC 7/21/20 12:17














Justicifation of Admission Dx:


Justifications for Admission:


Justification of Admission Dx:  Yes











HECTOR MASON III DO           Jul 21, 2020 13:40

## 2020-07-21 NOTE — PDOC
SURGICAL PROGRESS NOTE


Subjective


Pt off vent, WBC elevated, fevers


Vital Signs





Vital Signs








  Date Time  Temp Pulse Resp B/P (MAP) Pulse Ox O2 Delivery O2 Flow Rate FiO2


 


7/21/20 11:00  126 32 121/75 (90) 98 Nasal Cannula 2.0 


 


7/21/20 08:00 99.2       





 99.2       








I&O








l


Intake and Output 


 


 7/21/20





 06:59


 


Intake Total 2541 ml


 


Output Total 2529 ml


 


Balance 12 ml


 


 


 


IV Total 2541 ml


 


Output Urine Total 1110 ml


 


Chest Tube Drainage Total 30 ml


 


Drainage Total 1389 ml








PATIENT HAS A VILLASENOR:  Yes


General:  Alert, Cooperative


Abdomen:  Soft, No tenderness, Other (drains in place, min output on some)


Labs





Laboratory Tests








Test


 7/19/20


12:31 7/19/20


18:22 7/20/20


00:20 7/20/20


05:55


 


Glucose (Fingerstick)


 162 mg/dL


(70-99) 102 mg/dL


(70-99) 115 mg/dL


(70-99) 114 mg/dL


(70-99)


 


White Blood Count


 


 


 


 25.3 x10^3/uL


(4.0-11.0)


 


Red Blood Count


 


 


 


 2.87 x10^6/uL


(3.50-5.40)


 


Hemoglobin


 


 


 


 8.1 g/dL


(12.0-15.5)


 


Hematocrit


 


 


 


 24.8 %


(36.0-47.0)


 


Mean Corpuscular Volume    86 fL () 


 


Mean Corpuscular Hemoglobin    28 pg (25-35) 


 


Mean Corpuscular Hemoglobin


Concent 


 


 


 33 g/dL


(31-37)


 


Red Cell Distribution Width


 


 


 


 15.6 %


(11.5-14.5)


 


Platelet Count


 


 


 


 688 x10^3/uL


(140-400)


 


Neutrophils (%) (Auto)    85 % (31-73) 


 


Lymphocytes (%) (Auto)    8 % (24-48) 


 


Monocytes (%) (Auto)    6 % (0-9) 


 


Eosinophils (%) (Auto)    2 % (0-3) 


 


Basophils (%) (Auto)    0 % (0-3) 


 


Neutrophils # (Auto)


 


 


 


 21.4 x10^3/uL


(1.8-7.7)


 


Lymphocytes # (Auto)


 


 


 


 1.9 x10^3/uL


(1.0-4.8)


 


Monocytes # (Auto)


 


 


 


 1.5 x10^3/uL


(0.0-1.1)


 


Eosinophils # (Auto)


 


 


 


 0.4 x10^3/uL


(0.0-0.7)


 


Basophils # (Auto)


 


 


 


 0.1 x10^3/uL


(0.0-0.2)


 


Segmented Neutrophils %    76 % (35-66) 


 


Band Neutrophils %    9 % (0-9) 


 


Lymphocytes %    6 % (24-48) 


 


Monocytes %    7 % (0-10) 


 


Eosinophils %    1 % (0-5) 


 


Basophils %    1 % (0-3) 


 


Nucleated Red Blood Cells    1 


 


Platelet Estimate


 


 


 


 Increased


(ADEQUATE)


 


Large Platelets    Occ 


 


Anisocytosis    Slight 


 


Sodium Level


 


 


 


 133 mmol/L


(136-145)


 


Potassium Level


 


 


 


 4.6 mmol/L


(3.5-5.1)


 


Chloride Level


 


 


 


 100 mmol/L


()


 


Carbon Dioxide Level


 


 


 


 31 mmol/L


(21-32)


 


Anion Gap    2 (6-14) 


 


Blood Urea Nitrogen


 


 


 


 13 mg/dL


(7-20)


 


Creatinine


 


 


 


 0.6 mg/dL


(0.6-1.0)


 


Estimated GFR


(Cockcroft-Gault) 


 


 


 106.3 





 


Glucose Level


 


 


 


 113 mg/dL


(70-99)


 


Calcium Level


 


 


 


 9.4 mg/dL


(8.5-10.1)


 


Test


 7/20/20


14:39 7/20/20


18:03 7/20/20


23:59 7/21/20


05:22


 


Glucose (Fingerstick)


 151 mg/dL


(70-99) 128 mg/dL


(70-99) 138 mg/dL


(70-99) 141 mg/dL


(70-99)








Laboratory Tests








Test


 7/20/20


14:39 7/20/20


18:03 7/20/20


23:59 7/21/20


05:22


 


Glucose (Fingerstick)


 151 mg/dL


(70-99) 128 mg/dL


(70-99) 138 mg/dL


(70-99) 141 mg/dL


(70-99)








Problem List


Problems


Medical Problems:


(1) Acute pancreatitis


Status: Acute  





(2) Cholelithiasis


Status: Acute  








Assessment/Plan


will check CT and consider d/c some drains.





Justicifation of Admission Dx:


Justifications for Admission:


Justification of Admission Dx:  Yes











GAMAL ZHOU MD             Jul 21, 2020 12:00

## 2020-07-21 NOTE — NUR
SS following up with discharge planning. SS reviewed pt chart and discussed with pt RN. Pt 
white blood count increased and per RN, pt has had fevers. Pt may have ROBERT drains removed 
today pending physician decision. Per RN, one Avi drain fell out. Pt currently has 
chest tube, G tube, one Avi drain, and three ROBERT drains. Pt on TPN, IV Daptomycin, and 
IV Micafungin. Pt max assist with PT/OT/ and staff. Pt currently maintaining on two liters 
nasal canula. Per Med Assist they are working with family to complete disability 
application. Pt is self pay. SS will continue to follow for discharge planning.

## 2020-07-21 NOTE — PDOC
Objective:


Objective:


D/w nurse - to have CT today, possibly remove some drains.


Tmax 101.7.


Vital Signs:





                                   Vital Signs








  Date Time  Temp Pulse Resp B/P (MAP) Pulse Ox O2 Delivery O2 Flow Rate FiO2


 


7/21/20 11:00  126 32 121/75 (90) 98 Nasal Cannula 2.0 


 


7/21/20 08:00 99.2       





 99.2       








Labs:





Laboratory Tests








Test


 7/20/20


14:39 7/20/20


18:03 7/20/20


23:59 7/21/20


05:22


 


Glucose (Fingerstick) 151 mg/dL  128 mg/dL  138 mg/dL  141 mg/dL 








  GRAM STAIN EVALUATION  Final  


        Final





        GRAM NEGATIVE RODS:MANY


        SQUAMOUS EPI CELL:NONE SEEN


        PMN (WBCs):MANY





  RESPIRATORY CULTURE  Preliminary  


        Preliminary





PE:





GEN: chronically ill


LUNGS: NC 2L


HEART: tachycardic


ABD: drains


NEURO/PSYCH: A & O





A/P:


Gallstone pancreatitis s/p necrosectomy


Fever





--


Await CT.





Justicifation of Admission Dx:


Justifications for Admission:


Justification of Admission Dx:  Yes











RAGHAVENDRA MURCIA         Jul 21, 2020 11:52

## 2020-07-21 NOTE — PDOC
PULMONARY PROGRESS NOTES


Subjective


Patient was sleeping that woke her up, no respiratory complaint


Vitals





Vital Signs








  Date Time  Temp Pulse Resp B/P (MAP) Pulse Ox O2 Delivery O2 Flow Rate FiO2


 


7/21/20 13:03   36   Nasal Cannula 2.0 


 


7/21/20 12:35     100   


 


7/21/20 12:00 100.8 131  128/83 (98)    





 100.8       








ROS:  No Nausea, No Chest Pain, No Increase Cough


General:  Alert


HEENT:  Other (trach site ok)


Lungs:  Crackles, Other (l ct)


Cardiovascular:  S1, S2


Abdomen:  Soft, Non-tender, Other (multiple ROBERT drains )


Neuro Exam:  Alert


Extremities:  Other (+1 BLE edema)


Skin:  Warm


Labs





Laboratory Tests








Test


 7/19/20


18:22 7/20/20


00:20 7/20/20


05:55 7/20/20


14:39


 


Glucose (Fingerstick)


 102 mg/dL


(70-99) 115 mg/dL


(70-99) 114 mg/dL


(70-99) 151 mg/dL


(70-99)


 


White Blood Count


 


 


 25.3 x10^3/uL


(4.0-11.0) 





 


Red Blood Count


 


 


 2.87 x10^6/uL


(3.50-5.40) 





 


Hemoglobin


 


 


 8.1 g/dL


(12.0-15.5) 





 


Hematocrit


 


 


 24.8 %


(36.0-47.0) 





 


Mean Corpuscular Volume   86 fL ()  


 


Mean Corpuscular Hemoglobin   28 pg (25-35)  


 


Mean Corpuscular Hemoglobin


Concent 


 


 33 g/dL


(31-37) 





 


Red Cell Distribution Width


 


 


 15.6 %


(11.5-14.5) 





 


Platelet Count


 


 


 688 x10^3/uL


(140-400) 





 


Neutrophils (%) (Auto)   85 % (31-73)  


 


Lymphocytes (%) (Auto)   8 % (24-48)  


 


Monocytes (%) (Auto)   6 % (0-9)  


 


Eosinophils (%) (Auto)   2 % (0-3)  


 


Basophils (%) (Auto)   0 % (0-3)  


 


Neutrophils # (Auto)


 


 


 21.4 x10^3/uL


(1.8-7.7) 





 


Lymphocytes # (Auto)


 


 


 1.9 x10^3/uL


(1.0-4.8) 





 


Monocytes # (Auto)


 


 


 1.5 x10^3/uL


(0.0-1.1) 





 


Eosinophils # (Auto)


 


 


 0.4 x10^3/uL


(0.0-0.7) 





 


Basophils # (Auto)


 


 


 0.1 x10^3/uL


(0.0-0.2) 





 


Segmented Neutrophils %   76 % (35-66)  


 


Band Neutrophils %   9 % (0-9)  


 


Lymphocytes %   6 % (24-48)  


 


Monocytes %   7 % (0-10)  


 


Eosinophils %   1 % (0-5)  


 


Basophils %   1 % (0-3)  


 


Nucleated Red Blood Cells   1  


 


Platelet Estimate


 


 


 Increased


(ADEQUATE) 





 


Large Platelets   Occ  


 


Anisocytosis   Slight  


 


Sodium Level


 


 


 133 mmol/L


(136-145) 





 


Potassium Level


 


 


 4.6 mmol/L


(3.5-5.1) 





 


Chloride Level


 


 


 100 mmol/L


() 





 


Carbon Dioxide Level


 


 


 31 mmol/L


(21-32) 





 


Anion Gap   2 (6-14)  


 


Blood Urea Nitrogen


 


 


 13 mg/dL


(7-20) 





 


Creatinine


 


 


 0.6 mg/dL


(0.6-1.0) 





 


Estimated GFR


(Cockcroft-Gault) 


 


 106.3 


 





 


Glucose Level


 


 


 113 mg/dL


(70-99) 





 


Calcium Level


 


 


 9.4 mg/dL


(8.5-10.1) 





 


Test


 7/20/20


18:03 7/20/20


23:59 7/21/20


05:22 7/21/20


12:21


 


Glucose (Fingerstick)


 128 mg/dL


(70-99) 138 mg/dL


(70-99) 141 mg/dL


(70-99) 143 mg/dL


(70-99)








Laboratory Tests








Test


 7/20/20


14:39 7/20/20


18:03 7/20/20


23:59 7/21/20


05:22


 


Glucose (Fingerstick)


 151 mg/dL


(70-99) 128 mg/dL


(70-99) 138 mg/dL


(70-99) 141 mg/dL


(70-99)


 


Test


 7/21/20


12:21 


 


 





 


Glucose (Fingerstick)


 143 mg/dL


(70-99) 


 


 











Medications





Active Scripts








 Medications  Dose


 Route/Sig


 Max Daily Dose Days Date Category


 


 Bisoprolol


 Fumarate 5 Mg


 Tablet  10 Mg


 PO DAILY


   3/16/20 Reported








Comments


cxr 7/13, reviewed,   b ll infilt effusion atelectasis





ct reviewed 7/12/20, Decreased left-sided effusion after catheter placement. The




right-sided effusion has increased as has atelectasis.


 





 


There has been exchange or placement of multiple drainage 


tubes and a gastrojejunostomy tube. Both collections are smaller. No 


significant new abdominal fluid collection is seen. The jejunal component 


of the gastrojejunostomy tube appears to be looped in the proximal small 


bowel. 











ct abdomen /pelvis 6/6


1. Removal of the percutaneous pigtail drainage catheters since the prior 


exam. Sequela of pancreatitis with extensive pseudocysts again 


demonstrated, the right-sided collections are slightly larger since the 


prior exam, the left-sided collections are stable. See above.


2. Moderate to large left pleural effusion with atelectasis and collapse 


of most of the left lower lobe, stable. Small right pleural effusion is 


stable.


3. Gallstone.





ct chest 6/15 reviewed








 GRAM NEG COCCOBACILLI:MANY


        SQUAMOUS EPI CELL:RARE


        PMN (WBCs):FEW


        Unless otherwise specified, Testing Performed by:


        [address] [address]


        For Inquires, the Physician may contact the Microbiology


        department at [phone]





  RESPIRATORY CULTURE  Final  


        Final





        MANY GRAM NEGATIVE RODS on 06/15/20 at 1107


        FINAL ID= [PSEUDOMONAS AERUGINOSA]


        MICRO CHARGES


        PSEUDOMONAS AERUGINOSA





  ANTIMICROBIAL SUSCEPTIBILITY  Final  


        Comment





        NEG ANSON 56


        PSEUDOMONAS AERUGINOSA


        ANTIBIOTIC                        RESULT          INTERPRETATION


        AMIKACIN                          <=16                  S


        AZTREONAM                         <=4                   S


        CEFTAZIDIME                       <=1                   S


        CIPROFLOXACIN                     <=0.25                S


        CEFEPIME                          <=2                   S


        CEFTAZIDIME/AVIBACTAM             <=4                   S


        GENTAMICIN                        <=2                   S


        LEVOFLOXACIN                      <=0.5                 S





Impression


.





IMPRESSION:


1.  Acute hypoxemic respiratory failure secondary to ARDS status post trach, 

developed anemia 6/7, blood drainage from RLQ abdomen drain site, and 

surrounding firmness  / developed septic shock 6/7 from abdomen source, required

levo 6/7


s/p 3 new drains 6/7 with brown color drainage,  


2.  Gallstone pancreatitis, now with ongoing bleeding from prior drain. Anemic. 

s/p Tx multiple units over several days


3.  septic shock/sepsis, recurrent 6/7, source abdomen. new fever  ? aspiration 

pneumonitis/pneumonia


4.  Acute kidney injury-, Off HD--renal function decling. suspect JED on CKD due

to hypotension , improved now


5.  Acute gallstone pancreatitis.


6.  Hypoalbuminemia.


7.  Moderate persistent effusions, s/p left thora  5/12, reaccumulation of left 

effusion. O2 requirement not changed. 


8.  Fever- ,hypotension. suspect recurrent sepsis/ likely pancreatic source.  

Per ID, per surgery--


9.  Chronic anemia-- ongoing / s/p PRBC


10. Covid 19 testing negative


11. Moderate to large ascites-S/P paracentisis


12.S/P paracentisis with 4 liters removed on 4/15/20


13. S/P IR drain placement on 5/8/2020, removal, re inserted 6/7


14. Depression/Anxiety 


15.,  Fever, per ID


16.  Status post chest tube placement, not much drainage


6/30


S/P Exploratory laparotomy, lysis of adhesions, subtotal cholecystectomy with 

cholangiogram, gastrojejunostomy tube placement, pancreatic necrosectomy


leukocytosis- improving





Plan


.


Chest tube still draining a bit, I have asked the nurse to utilize Cathflo 

again,


She remains febrile we will send pleural fluid for analysis


Monitor H&H


Trach shield, as tolerated


Up to chair, pt/ot


Follow culture


Follow surgery input


DVT GI prophylaxis


ABX per ID 


f/u BC /resp cultures 


Continue TPN for nutrition support 


DVT/GI PPX


D/W RN and RT,











STEVE MIRANDA MD              Jul 21, 2020 14:17

## 2020-07-21 NOTE — PDOC
Infectious Disease Note


Subjective:


Subjective


Pt resting quietly


febrile >101 last 24 hrs


on vent  via trach shield 


TPN





Vital Signs:


Vital Signs





Vital Signs








  Date Time  Temp Pulse Resp B/P (MAP) Pulse Ox O2 Delivery O2 Flow Rate FiO2


 


7/21/20 07:00  118 31 113/72 (86) 99 Nasal Cannula 2.0 


 


7/21/20 04:00 99.0       





 99.0       











Physical Exam:


PHYSICAL EXAM


GENERAL: Propped up in bed, resting quietly 


HEENT: Pupils equal, oral cavity dry. NGT out, 


NECK:  Tracheostomy 


LUNGS: Diminished aeration bases,  CT on left 


HEART:  S1, S2, regular, tachy 110s 


ABDOMEN: Distended, bowel sounds hypoactive, soft, richardson x 2, 3 ROBERT drains, G-J 

tube and + wound vac 


: [place] in place 


EXTREMITIES: Trace generalized edema, no cyanosis. MARTHA hose bilaterally, 


SKIN: warm touch. No signs of rash.  


NEURO: Did not awaken 


LUE-PICC without signs of complications





Medications:


Inpatient Meds:





Current Medications








 Medications


  (Trade)  Dose


 Ordered  Sig/Yee  Start Time


 Stop Time Status Last Admin


Dose Admin


 


 Acetaminophen


  (Tylenol Supp)  650 mg  PRN Q6HRS  PRN  3/24/20 10:30


    7/20/20 22:11


650 MG


 


 Acetaminophen


  (Tylenol)  650 mg  PRN Q6HRS  PRN  3/21/20 03:36


 5/13/20 10:25 DC 4/16/20 19:56


650 MG


 


 Acetylcysteine


  (Mucomyst 20%


 Resp Treatment)  600 mg  RTBID  6/27/20 12:00


    7/20/20 20:01


600 MG


 


 Albumin Human  500 ml @ 


 125 mls/hr  PRN Q1HR  PRN  6/30/20 15:45


     





 


 Albuterol Sulfate


  (Ventolin Neb


 Soln)  2.5 mg  1X  ONCE  3/17/20 22:30


 3/17/20 22:31 DC 3/18/20 00:56


2.5 MG


 


 Albuterol/


 Ipratropium


  (Duoneb)  3 ml  Q4HRS  6/13/20 08:00


    7/21/20 03:46


3 ML


 


 Alprazolam


  (Xanax)  0.5 mg  PRN TID  PRN  7/17/20 08:00


    7/20/20 22:57


0.5 MG


 


 Alteplase,


 Recombinant


  (Cathflo For


 Central Catheter


 Clearance)  1 mg  1X  ONCE  7/19/20 08:15


 7/19/20 08:16 DC 7/19/20 08:23


1 MG


 


 Alteplase,


 Recombinant 4 mg/


 Sodium Chloride  20 ml @ 20


 mls/hr  1X  ONCE  6/17/20 10:00


 6/17/20 10:59 DC 6/17/20 10:09


20 MLS/HR


 


 Amino Acids/


 Glycerin/


 Electrolytes  1,000 ml @ 


 75 mls/hr  [id number]  4/20/20 21:15


   UNV  





 


 Artificial Tears


  (Artificial


 Tears)  1 drop  PRN Q15MIN  PRN  4/29/20 05:30


    6/23/20 21:17


1 DROP


 


 Atenolol


  (Tenormin)  100 mg  DAILY  3/17/20 09:00


 3/16/20 20:08 DC  





 


 Atropine Sulfate


  (ATROPINE 0.5mg


 SYRINGE)  0.5 mg  PRN Q5MIN  PRN  4/2/20 08:15


     





 


 Barium Sulfate


  (Varibar Thin


 Liquid Apple)  148 gm  1X  ONCE  5/26/20 11:45


 5/26/20 11:49 DC  





 


 Benzocaine


  (Hurricaine One)  1 spray  1X  ONCE  3/20/20 14:30


 3/20/20 14:31 DC 3/20/20 16:38


1 SPRAY


 


 Bisacodyl


  (Dulcolax Supp)  10 mg  STK-MED ONCE  4/27/20 10:59


 4/27/20 10:59 DC  





 


 Bumetanide


  (Bumex)  2 mg  DAILY  5/8/20 10:00


 5/18/20 17:15 DC 5/18/20 08:07


2 MG


 


 Bupivacaine HCl/


 Epinephrine Bitart


  (Sensorcain-Epi


 0.5%-1:364467 Mpf)  30 ml  STK-MED ONCE  6/30/20 08:34


 6/30/20 08:35 DC  





 


 Calcium Carbonate/


 Glycine


  (Tums)  500 mg  PRN [id number]  PRN  3/18/20 17:45


 5/13/20 10:25 DC  





 


 Calcium Chloride


 1000 mg/Sodium


 Chloride  110 ml @ 


 220 mls/hr  1X  ONCE  3/17/20 22:30


 3/17/20 22:59 DC 3/17/20 22:11


220 MLS/HR


 


 Calcium Chloride


 3000 mg/Sodium


 Chloride  1,030 ml @ 


 50 mls/hr  [id number]  3/19/20 08:00


 3/21/20 15:23 DC 3/21/20 02:17


50 MLS/HR


 


 Calcium Gluconate


  (Calcium


 Gluconate)  2,000 mg  1X  ONCE  3/19/20 02:15


 3/19/20 02:16 DC 3/19/20 02:19


2,000 MG


 


 Calcium Gluconate


 1000 mg/Sodium


 Chloride  110 ml @ 


 220 mls/hr  1X  ONCE  3/18/20 03:30


 3/18/20 03:59 DC 3/18/20 03:21


220 MLS/HR


 


 Calcium Gluconate


 2000 mg/Sodium


 Chloride  120 ml @ 


 220 mls/hr  1X  ONCE  3/18/20 07:30


 3/18/20 08:02 DC 3/18/20 09:05


220 MLS/HR


 


 Cefepime HCl


  (Maxipime)  2 gm  Q12HR  3/25/20 09:00


 4/8/20 09:58 DC 4/7/20 20:56


2 GM


 


 Cellulose


  (Surgicel


 Fibrillar 1x2)  1 each  STK-MED ONCE  4/6/20 11:00


 4/6/20 11:01 DC  





 


 Cellulose


  (Surgicel


 Hemostat 2x14)  1 each  STK-MED ONCE  4/27/20 10:58


 4/27/20 10:59 DC  





 


 Cellulose


  (Surgicel


 Hemostat 4x8)  1 each  STK-MED ONCE  4/27/20 10:58


 4/27/20 10:59 DC  





 


 Chlorhexidine


 Gluconate


  (Peridex)  15 ml  BID  6/13/20 09:00


 6/13/20 07:58 DC  





 


 Ciprofloxacin/


 Dextrose  200 ml @ 


 200 mls/hr  Q12HR  7/12/20 10:00


    7/20/20 21:02


200 MLS/HR


 


 Cyclobenzaprine


 HCl


  (Flexeril)  10 mg  PRN Q6HRS  PRN  4/30/20 10:45


    7/10/20 19:12


10 MG


 


 Daptomycin 410 mg/


 Sodium Chloride  50 ml @ 


 100 mls/hr  Q24H  6/7/20 14:00


 6/10/20 08:30 DC 6/9/20 13:33


100 MLS/HR


 


 Daptomycin 430 mg/


 Sodium Chloride  50 ml @ 


 100 mls/hr  Q24H  4/25/20 13:00


 4/30/20 20:58 DC 4/30/20 13:00


100 MLS/HR


 


 Daptomycin 450 mg/


 Sodium Chloride  50 ml @ 


 100 mls/hr  Q24H  5/17/20 09:00


 5/21/20 08:30 DC 5/20/20 09:25


100 MLS/HR


 


 Daptomycin 485 mg/


 Sodium Chloride  50 ml @ 


 100 mls/hr  Q24H  5/4/20 11:00


 5/12/20 07:44 DC 5/11/20 13:10


100 MLS/HR


 


 Daptomycin 500 mg/


 Sodium Chloride  50 ml @ 


 100 mls/hr  Q24H  7/15/20 09:00


    7/20/20 09:14


100 MLS/HR


 


 Desflurane


  (Suprane)  90 ml  STK-MED ONCE  6/30/20 10:18


 6/30/20 10:19 DC  





 


 Dexamethasone


 Sodium Phosphate


  (Decadron)  4 mg  STK-MED ONCE  4/27/20 10:56


 4/27/20 10:57 DC  





 


 Dexmedetomidine


 HCl 400 mcg/


 Sodium Chloride  100 ml @ 0


 mls/hr  CONT  PRN  4/2/20 08:15


 5/30/20 18:31 DC 5/30/20 12:57


8 MLS/HR


 


 Dextrose


  (Dextrose


 50%-Water Syringe)  12.5 gm  PRN Q15MIN  PRN  3/16/20 09:30


     





 


 Digoxin


  (Lanoxin)  125 mcg  1X  ONCE  3/19/20 18:00


 3/19/20 18:01 DC 3/19/20 17:10


125 MCG


 


 Diphenhydramine


 HCl


  (Benadryl)  25 mg  1X  ONCE  7/16/20 19:00


 7/16/20 19:01 DC 7/16/20 18:56


25 MG


 


 Duloxetine HCl


  (Cymbalta)  30 mg  DAILY  5/10/20 14:00


 5/13/20 10:25 DC 5/11/20 09:48


30 MG


 


 Enoxaparin Sodium


  (Lovenox 100mg


 Syringe)  100 mg  Q12HR  4/21/20 21:00


   UNV  





 


 Enoxaparin Sodium


  (Lovenox 40mg


 Syringe)  40 mg  Q24H  7/1/20 08:00


    7/20/20 08:41


40 MG


 


 Ephedrine Sulfate


  (ePHEDrine PF IN


 SALINE SYRINGE)  50 mg  STK-MED ONCE  6/30/20 14:45


 6/30/20 14:45 DC  





 


 Etomidate


  (Amidate)  8 mg  1X  ONCE  3/23/20 08:30


 3/23/20 08:31 DC 3/23/20 08:33


8 MG


 


 Fentanyl


  (Duragesic 12mcg/


 Hr Patch)  1 patch  Q3DAYS  7/10/20 09:00


    7/19/20 09:00


1 PATCH


 


 Fentanyl


  (Duragesic 50mcg/


 Hr Patch)  1 patch  Q72H  6/4/20 21:00


 6/13/20 12:00 DC 6/4/20 21:22


1 PATCH


 


 Fentanyl Citrate  30 ml @ 0


 mls/hr  CONT  PRN  7/9/20 17:30


     





 


 Fentanyl Citrate


  (Fentanyl 2ml


 Vial)  100 mcg  STK-MED ONCE  6/30/20 07:44


 6/30/20 07:44 DC  





 


 Fentanyl Citrate


  (Fentanyl 5ml


 Vial)  250 mcg  1X  ONCE  5/8/20 09:15


 5/8/20 09:16 DC 5/8/20 09:30


50 MCG


 


 Flumazenil


  (Romazicon)  0.5 mg  STK-MED ONCE  6/7/20 14:48


 6/7/20 14:48 DC  





 


 Fluoxetine HCl


  (PROzac)  20 mg  QHS  6/4/20 21:00


    7/20/20 21:02


20 MG


 


 Furosemide


  (Lasix)  40 mg  1X  ONCE  6/24/20 15:30


 6/24/20 15:33 DC 6/24/20 16:27


40 MG


 


 Haloperidol


 Lactate


  (Haldol Inj)  3 mg  1X  ONCE  5/4/20 14:30


 5/4/20 14:31 DC 5/4/20 14:37


3 MG


 


 Heparin Sodium


  (Porcine)


  (Hep Lock Adult)  500 unit  STK-MED ONCE  4/7/20 09:29


 4/7/20 09:30 DC  





 


 Heparin Sodium


  (Porcine)


  (Heparin Sodium)  5,000 unit  Q12HR  4/27/20 21:00


 5/7/20 09:59 DC 5/6/20 20:57


5,000 UNIT


 


 Heparin Sodium


  (Porcine) 1000


 unit/Sodium


 Chloride  1,001 ml @ 


 1,001 mls/hr  1X  ONCE  6/30/20 06:00


 6/30/20 06:59 DC  





 


 Hydromorphone HCl


  (Dilaudid


 Standard PCA)  12 mg  STK-MED ONCE  5/1/20 15:50


 5/12/20 11:24 DC  





 


 Hydromorphone HCl


  (Dilaudid)  1 mg  PRN Q4HRS  PRN  5/4/20 19:00


 5/18/20 17:10 DC 5/18/20 06:25


1 MG


 


 Info


  (CONTRAST GIVEN


 -- Rx MONITORING)  1 each  PRN DAILY  PRN  3/30/20 11:45


 4/1/20 11:44 DC  





 


 Info


  (Icu Electrolyte


 Protocol)  1 ea  CONT PRN  PRN  3/29/20 13:15


     





 


 Info


  (PHARMACY


 MONITORING -- do


 not chart)  1 each  PRN DAILY  PRN  4/24/20 15:45


 5/26/20 14:14 DC  





 


 Info


  (Tpn Per


 Pharmacy)  1 each  PRN DAILY  PRN  3/18/20 12:30


   UNV  





 


 Insulin Human


 Lispro


  (HumaLOG)  0-9 UNITS  Q6HRS  3/16/20 09:30


    7/20/20 14:42


4 UNITS


 


 Insulin Human


 Regular


  (HumuLIN R VIAL)  5 unit  1X  ONCE  3/17/20 22:30


 3/17/20 22:31 DC 3/17/20 22:14


5 UNIT


 


 Iohexol


  (Omnipaque 240


 Mg/ml)  30 ml  1X  ONCE  3/30/20 11:30


 3/30/20 11:33 DC 3/30/20 11:30


30 ML


 


 Iohexol


  (Omnipaque 300


 Mg/ml)  50 ml  STK-MED ONCE  6/30/20 08:35


 6/30/20 08:35 DC 6/30/20 13:30


10 ML


 


 Iohexol


  (Omnipaque 350


 Mg/ml)  90 ml  1X  ONCE  3/16/20 03:30


 3/16/20 03:31 DC 3/16/20 03:25


90 ML


 


 Ketorolac


 Tromethamine


  (Toradol 30mg


 Vial)  30 mg  1X  ONCE  3/16/20 03:00


 3/16/20 03:01 DC 3/16/20 02:54


30 MG


 


 Lidocaine HCl


  (Buffered


 Lidocaine 1%)  3 ml  1X  ONCE  6/15/20 13:00


 6/15/20 13:01 DC 6/15/20 13:11


12 ML


 


 Lidocaine HCl


  (Glydo


  (Lidocaine) Jelly)  1 thomas  1X  ONCE  3/20/20 14:30


 3/20/20 14:31 DC 3/20/20 16:38


1 THOMAS


 


 Lidocaine HCl


  (Lidocaine 1%


 20ml Vial)  20 ml  1X  ONCE  6/7/20 15:00


 6/7/20 15:01 DC 6/7/20 15:30


20 ML


 


 Lidocaine HCl


  (Lidocaine Pf 2%


 Vial)  5 ml  STK-MED ONCE  6/30/20 07:44


 6/30/20 07:44 DC  





 


 Lidocaine HCl


  (Xylocaine-Mpf


 1% 2ml Vial)  2 ml  PRN 1X  PRN  4/27/20 07:00


 4/28/20 06:59 DC  





 


 Linezolid/Dextrose  300 ml @ 


 300 mls/hr  Q12HR  5/17/20 09:00


 5/20/20 08:11 DC 5/19/20 21:08


300 MLS/HR


 


 Lorazepam


  (Ativan Inj)  0.25 mg  PRN Q4HRS  PRN  6/3/20 07:30


    7/20/20 03:28


0.25 MG


 


 Magnesium Sulfate  50 ml @ 25


 mls/hr  1X  ONCE  6/30/20 16:30


 6/30/20 18:29 DC 6/30/20 17:02


25 MLS/HR


 


 Meropenem 1 gm/


 Sodium Chloride  100 ml @ 


 200 mls/hr  Q12HR  6/7/20 21:00


 6/25/20 08:56 DC 6/25/20 08:27


200 MLS/HR


 


 Meropenem 500 mg/


 Sodium Chloride  50 ml @ 


 100 mls/hr  Q6HRS  6/28/20 18:00


    7/21/20 06:15


100 MLS/HR


 


 Methylprednisolone


 Sodium Succinate


  (SOLU-Medrol


 125MG VIAL)  125 mg  1X  ONCE  6/13/20 06:15


 6/13/20 06:16 DC 6/13/20 06:26


125 MG


 


 Metoclopramide HCl


  (Reglan Vial)  10 mg  PRN Q3HRS  PRN  5/9/20 16:45


    5/14/20 04:25


10 MG


 


 Metoprolol


 Tartrate


  (Lopressor Vial)  5 mg  PRN Q6HRS  PRN  6/10/20 09:00


    7/18/20 17:50


5 MG


 


 Metronidazole  100 ml @ 


 100 mls/hr  Q8HRS  4/14/20 10:00


 4/21/20 08:10 DC 4/21/20 06:04


100 MLS/HR


 


 Micafungin Sodium


 100 mg/Dextrose  100 ml @ 


 100 mls/hr  Q24H  6/30/20 08:30


    7/20/20 08:43


100 MLS/HR


 


 Midazolam HCl


  (Versed)  2 mg  1X  ONCE  6/7/20 15:00


 6/7/20 15:01 DC 6/7/20 15:28


1 MG


 


 Midazolam HCl 100


 mg/Sodium Chloride  100 ml @ 1


 mls/hr  CONT  PRN  6/30/20 14:45


    7/3/20 18:48


10 MLS/HR


 


 Midazolam HCl 50


 mg/Sodium Chloride  50 ml @ 0


 mls/hr  CONT  PRN  3/23/20 08:15


 3/28/20 15:59 DC 3/26/20 22:39


7 MLS/HR


 


 Morphine Sulfate


  (Morphine


 Sulfate)  1 mg  PRN Q1HR  PRN  6/30/20 14:45


     





 


 Multi-Ingred


 Cream/Lotion/Oil/


 Oint


  (Artificial


 Tears Eye


 Ointment)  1 thomas  PRN Q1HR  PRN  3/25/20 17:30


 6/3/20 14:39 DC 4/13/20 08:19


1 THOMAS


 


 Naloxone HCl


  (Narcan)  0.4 mg  PRN Q2MIN  PRN  6/30/20 14:45


     





 


 Norepinephrine


 Bitartrate 8 mg/


 Dextrose  258 ml @ 


 13.332 mls/


 hr  CONT  PRN  6/7/20 06:30


    7/2/20 09:09


1.6 MLS/HR


 


 Ondansetron HCl


  (Zofran)  4 mg  STK-MED ONCE  6/30/20 13:33


 6/30/20 13:33 DC  





 


 Pantoprazole


 Sodium


  (PROTONIX VIAL


 for IV PUSH)  40 mg  DAILYAC  3/16/20 11:30


    7/20/20 09:12


40 MG


 


 Phenylephrine HCl


  (Ken-Synephrine


 Inj)  10 mg  STK-MED ONCE  6/30/20 13:33


 6/30/20 13:33 DC  





 


 Phenylephrine HCl


  (PHENYLEPHRINE


 in 0.9% NACL PF)  1 mg  STK-MED ONCE  6/30/20 14:44


 6/30/20 14:45 DC  





 


 Piperacillin Sod/


 Tazobactam Sod


 3.375 gm/Sodium


 Chloride  50 ml @ 


 100 mls/hr  Q6HRS  5/27/20 12:00


 6/4/20 07:26 DC 6/4/20 06:10


100 MLS/HR


 


 Piperacillin Sod/


 Tazobactam Sod


 4.5 gm/Sodium


 Chloride  100 ml @ 


 200 mls/hr  1X  ONCE  3/16/20 06:00


 3/16/20 06:29 DC 3/16/20 05:44


200 MLS/HR


 


 Potassium


 Chloride 110 meq/


 Magnesium Sulfate


 20 meq/


 Multivitamins 10


 ml/Chromium/


 Copper/Manganese/


 Seleni/Zn 1 ml/


 Insulin Human


 Regular 15 unit/


 Total Parenteral


 Nutrition/Amino


 Acids/Dextrose/


 Fat Emulsion


 Intravenous  1,800 ml @ 


 75 mls/hr  TPN  CONT  5/24/20 22:00


 5/25/20 21:59 DC 5/24/20 22:48


75 MLS/HR


 


 Potassium


 Chloride 15 meq/


 Bicarbonate


 Dialysis Soln w/


 out KCl  5,007.5 ml


  @ 1,000 mls/


 hr  Q5H1M  3/29/20 20:00


 4/2/20 13:08 DC 4/1/20 18:14


1,000 MLS/HR


 


 Potassium


 Chloride 20 meq/


 Bicarbonate


 Dialysis Soln w/


 out KCl  5,010 ml @ 


 1,000 mls/hr  Q5H1M  3/25/20 16:00


 3/29/20 19:59 DC 3/29/20 14:54


1,000 MLS/HR


 


 Potassium


 Chloride 40 meq/


 Potassium Acetate


 60 meq/Magnesium


 Sulfate 10 meq/


 Multivitamins 10


 ml/Chromium/


 Copper/Manganese/


 Seleni/Zn 1 ml/


 Insulin Human


 Regular 20 unit/


 Total Parenteral


 Nutrition/Amino


 Acids/Dextrose/


 Fat Emulsion


 Intravenous  1,800 ml @ 


 75 mls/hr  TPN  CONT  6/4/20 22:00


 6/5/20 21:59 DC 6/5/20 00:03


75 MLS/HR


 


 Potassium


 Chloride 70 meq/


 Magnesium Sulfate


 20 meq/


 Multivitamins 10


 ml/Chromium/


 Copper/Manganese/


 Seleni/Zn 1 ml/


 Insulin Human


 Regular 15 unit/


 Total Parenteral


 Nutrition/Amino


 Acids/Dextrose/


 Fat Emulsion


 Intravenous  1,800 ml @ 


 75 mls/hr  TPN  CONT  5/29/20 22:00


 5/30/20 21:59 DC 5/29/20 23:13


75 MLS/HR


 


 Potassium


 Chloride 75 meq/


 Magnesium Sulfate


 15 meq/


 Multivitamins 10


 ml/Chromium/


 Copper/Manganese/


 Seleni/Zn 0.5 ml/


 Insulin Human


 Regular 15 unit/


 Total Parenteral


 Nutrition/Amino


 Acids/Dextrose/


 Fat Emulsion


 Intravenous  1,920 ml @ 


 80 mls/hr  TPN  CONT  5/9/20 22:00


 5/10/20 21:59 DC 5/9/20 22:41


80 MLS/HR


 


 Potassium


 Chloride 75 meq/


 Magnesium Sulfate


 15 meq/Calcium


 Gluconate 8 meq/


 Multivitamins 10


 ml/Chromium/


 Copper/Manganese/


 Seleni/Zn 0.5 ml/


 Insulin Human


 Regular 15 unit/


 Total Parenteral


 Nutrition/Amino


 Acids/Dextrose/


 Fat Emulsion


 Intravenous  1,920 ml @ 


 80 mls/hr  TPN  CONT  5/7/20 22:00


 5/8/20 21:59 DC 5/7/20 22:28


80 MLS/HR


 


 Potassium


 Chloride 75 meq/


 Magnesium Sulfate


 15 meq/Calcium


 Gluconate 8 meq/


 Multivitamins 10


 ml/Chromium/


 Copper/Manganese/


 Seleni/Zn 0.5 ml/


 Insulin Human


 Regular 20 unit/


 Total Parenteral


 Nutrition/Amino


 Acids/Dextrose/


 Fat Emulsion


 Intravenous  1,920 ml @ 


 80 mls/hr  TPN  CONT  5/6/20 22:00


 5/7/20 21:59 DC 5/6/20 22:00


80 MLS/HR


 


 Potassium


 Chloride 75 meq/


 Magnesium Sulfate


 15 meq/Calcium


 Gluconate 8 meq/


 Multivitamins 10


 ml/Chromium/


 Copper/Manganese/


 Seleni/Zn 0.5 ml/


 Insulin Human


 Regular 25 unit/


 Total Parenteral


 Nutrition/Amino


 Acids/Dextrose/


 Fat Emulsion


 Intravenous  1,920 ml @ 


 80 mls/hr  TPN  CONT  5/4/20 22:00


 5/5/20 21:59 DC 5/4/20 23:08


80 MLS/HR


 


 Potassium


 Chloride 75 meq/


 Magnesium Sulfate


 20 meq/Calcium


 Gluconate 10 meq/


 Multivitamins 10


 ml/Chromium/


 Copper/Manganese/


 Seleni/Zn 0.5 ml/


 Insulin Human


 Regular 25 unit/


 Total Parenteral


 Nutrition/Amino


 Acids/Dextrose/


 Fat Emulsion


 Intravenous  1,920 ml @ 


 80 mls/hr  TPN  CONT  5/3/20 22:00


 5/4/20 21:59 DC 5/3/20 22:04


80 MLS/HR


 


 Potassium


 Chloride 75 meq/


 Magnesium Sulfate


 20 meq/Calcium


 Gluconate 10 meq/


 Multivitamins 10


 ml/Chromium/


 Copper/Manganese/


 Seleni/Zn 0.5 ml/


 Insulin Human


 Regular 30 unit/


 Total Parenteral


 Nutrition/Amino


 Acids/Dextrose/


 Fat Emulsion


 Intravenous  1,920 ml @ 


 80 mls/hr  TPN  CONT  5/2/20 22:00


 5/3/20 22:00 DC 5/2/20 21:51


80 MLS/HR


 


 Potassium


 Chloride 80 meq/


 Magnesium Sulfate


 20 meq/


 Multivitamins 10


 ml/Chromium/


 Copper/Manganese/


 Seleni/Zn 0.5 ml/


 Insulin Human


 Regular 15 unit/


 Total Parenteral


 Nutrition/Amino


 Acids/Dextrose/


 Fat Emulsion


 Intravenous  1,920 ml @ 


 80 mls/hr  TPN  CONT  5/12/20 22:00


 5/13/20 21:59 DC 5/12/20 21:40


80 MLS/HR


 


 Potassium


 Chloride 80 meq/


 Magnesium Sulfate


 20 meq/


 Multivitamins 10


 ml/Chromium/


 Copper/Manganese/


 Seleni/Zn 1 ml/


 Insulin Human


 Regular 15 unit/


 Total Parenteral


 Nutrition/Amino


 Acids/Dextrose/


 Fat Emulsion


 Intravenous  1,800 ml @ 


 75 mls/hr  TPN  CONT  5/31/20 22:00


 6/1/20 21:59 DC 5/31/20 21:54


75 MLS/HR


 


 Potassium


 Chloride 90 meq/


 Magnesium Sulfate


 20 meq/


 Multivitamins 10


 ml/Chromium/


 Copper/Manganese/


 Seleni/Zn 1 ml/


 Insulin Human


 Regular 15 unit/


 Total Parenteral


 Nutrition/Amino


 Acids/Dextrose/


 Fat Emulsion


 Intravenous  1,800 ml @ 


 75 mls/hr  TPN  CONT  5/20/20 22:00


 5/21/20 21:59 DC 5/20/20 22:28


75 MLS/HR


 


 Potassium


 Chloride 90 meq/


 Magnesium Sulfate


 20 meq/


 Multivitamins 10


 ml/Chromium/


 Copper/Manganese/


 Seleni/Zn 1 ml/


 Insulin Human


 Regular 20 unit/


 Total Parenteral


 Nutrition/Amino


 Acids/Dextrose/


 Fat Emulsion


 Intravenous  1,800 ml @ 


 75 mls/hr  TPN  CONT  6/3/20 22:00


 6/4/20 21:59 DC 6/3/20 23:13


75 MLS/HR


 


 Potassium


 Chloride/Water  100 ml @ 


 100 mls/hr  Q1H  6/17/20 08:00


 6/17/20 09:59 DC 6/17/20 09:12


100 MLS/HR


 


 Potassium


 Phosphate 20 mmol/


 Sodium Chloride  106.6667


 ml @ 


 51.667 m...  1X  ONCE  3/25/20 13:00


 3/25/20 15:03 DC 3/25/20 12:51


51.667 MLS/HR


 


 Potassium Acetate


 30 meq/Magnesium


 Sulfate 14 meq/


 Multivitamins 10


 ml/Chromium/


 Copper/Manganese/


 Seleni/Zn 1 ml/


 Insulin Human


 Regular 15 unit/


 Sodium Chloride


 20 meq/Potassium


 Chloride 30 meq/


 Total Parenteral


 Nutrition/Amino


 Acids/Dextrose/


 Fat Emulsion


 Intravenous  1,920 ml @ 


 80 mls/hr  TPN  CONT  6/23/20 22:00


 6/24/20 21:59 DC 6/23/20 21:46


80 MLS/HR


 


 Potassium Acetate


 30 meq/Magnesium


 Sulfate 20 meq/


 Calcium Gluconate


 10 meq/


 Multivitamins 10


 ml/Chromium/


 Copper/Manganese/


 Seleni/Zn 0.5 ml/


 Insulin Human


 Regular 30 unit/


 Potassium


 Chloride 30 meq/


 Total Parenteral


 Nutrition/Amino


 Acids/Dextrose/


 Fat Emulsion


 Intravenous  1,920 ml @ 


 80 mls/hr  TPN  CONT  5/1/20 22:00


 5/2/20 21:59 DC 5/1/20 22:34


80 MLS/HR


 


 Potassium Acetate


 40 meq/Magnesium


 Sulfate 10 meq/


 Multivitamins 10


 ml/Chromium/


 Copper/Manganese/


 Seleni/Zn 1 ml/


 Insulin Human


 Regular 20 unit/


 Total Parenteral


 Nutrition/Amino


 Acids/Dextrose/


 Fat Emulsion


 Intravenous  1,920 ml @ 


 80 mls/hr  TPN  CONT  6/16/20 22:00


 6/17/20 21:59 DC 6/16/20 21:32


80 MLS/HR


 


 Potassium Acetate


 40 meq/Magnesium


 Sulfate 5 meq/


 Multivitamins 10


 ml/Chromium/


 Copper/Manganese/


 Seleni/Zn 1 ml/


 Insulin Human


 Regular 30 unit/


 Total Parenteral


 Nutrition/Amino


 Acids/Dextrose/


 Fat Emulsion


 Intravenous  1,920 ml @ 


 80 mls/hr  TPN  CONT  6/15/20 22:00


 6/16/20 19:34 DC 6/15/20 21:54


80 MLS/HR


 


 Potassium Acetate


 55 meq/Magnesium


 Sulfate 20 meq/


 Calcium Gluconate


 10 meq/


 Multivitamins 10


 ml/Chromium/


 Copper/Manganese/


 Seleni/Zn 0.5 ml/


 Insulin Human


 Regular 30 unit/


 Total Parenteral


 Nutrition/Amino


 Acids/Dextrose/


 Fat Emulsion


 Intravenous  1,920 ml @ 


 80 mls/hr  TPN  CONT  4/30/20 22:00


 5/1/20 21:59 DC 5/1/20 01:00


80 MLS/HR


 


 Potassium Acetate


 55 meq/Magnesium


 Sulfate 20 meq/


 Calcium Gluconate


 10 meq/


 Multivitamins 10


 ml/Chromium/


 Copper/Manganese/


 Seleni/Zn 0.5 ml/


 Insulin Human


 Regular 35 unit/


 Total Parenteral


 Nutrition/Amino


 Acids/Dextrose/


 Fat Emulsion


 Intravenous  1,920 ml @ 


 80 mls/hr  TPN  CONT  4/28/20 22:00


 4/29/20 21:59 DC 4/28/20 22:02


80 MLS/HR


 


 Potassium Acetate


 60 meq/Magnesium


 Sulfate 10 meq/


 Multivitamins 10


 ml/Chromium/


 Copper/Manganese/


 Seleni/Zn 1 ml/


 Insulin Human


 Regular 20 unit/


 Total Parenteral


 Nutrition/Amino


 Acids/Dextrose/


 Fat Emulsion


 Intravenous  1,920 ml @ 


 80 mls/hr  TPN  CONT  6/17/20 22:00


 6/18/20 21:59 DC 6/17/20 21:55


80 MLS/HR


 


 Potassium Acetate


 60 meq/Magnesium


 Sulfate 14 meq/


 Multivitamins 10


 ml/Chromium/


 Copper/Manganese/


 Seleni/Zn 1 ml/


 Insulin Human


 Regular 15 unit/


 Sodium Chloride


 20 meq/Total


 Parenteral


 Nutrition/Amino


 Acids/Dextrose/


 Fat Emulsion


 Intravenous  1,920 ml @ 


 80 mls/hr  TPN  CONT  6/22/20 22:00


 6/23/20 21:59 DC 6/22/20 21:54


80 MLS/HR


 


 Potassium Acetate


 60 meq/Magnesium


 Sulfate 14 meq/


 Multivitamins 10


 ml/Chromium/


 Copper/Manganese/


 Seleni/Zn 1 ml/


 Insulin Human


 Regular 15 unit/


 Total Parenteral


 Nutrition/Amino


 Acids/Dextrose/


 Fat Emulsion


 Intravenous  1,920 ml @ 


 80 mls/hr  TPN  CONT  6/21/20 22:00


 6/22/20 21:59 DC 6/21/20 22:22


80 MLS/HR


 


 Potassium Acetate


 60 meq/Magnesium


 Sulfate 14 meq/


 Multivitamins 10


 ml/Chromium/


 Copper/Manganese/


 Seleni/Zn 1 ml/


 Insulin Human


 Regular 20 unit/


 Total Parenteral


 Nutrition/Amino


 Acids/Dextrose/


 Fat Emulsion


 Intravenous  1,920 ml @ 


 80 mls/hr  TPN  CONT  6/18/20 22:00


 6/19/20 21:59 DC 6/18/20 22:26


80 MLS/HR


 


 Potassium Acetate


 60 meq/Magnesium


 Sulfate 5 meq/


 Multivitamins 10


 ml/Chromium/


 Copper/Manganese/


 Seleni/Zn 1 ml/


 Insulin Human


 Regular 30 unit/


 Total Parenteral


 Nutrition/Amino


 Acids/Dextrose/


 Fat Emulsion


 Intravenous  1,920 ml @ 


 80 mls/hr  TPN  CONT  6/6/20 22:00


 6/7/20 21:59 DC 6/6/20 21:54


80 MLS/HR


 


 Potassium Acetate


 65 meq/Magnesium


 Sulfate 20 meq/


 Calcium Gluconate


 10 meq/


 Multivitamins 10


 ml/Chromium/


 Copper/Manganese/


 Seleni/Zn 0.5 ml/


 Insulin Human


 Regular 30 unit/


 Total Parenteral


 Nutrition/Amino


 Acids/Dextrose/


 Fat Emulsion


 Intravenous  1,920 ml @ 


 80 mls/hr  TPN  CONT  4/29/20 22:00


 4/30/20 21:59 DC 4/29/20 22:22


80 MLS/HR


 


 Potassium Acetate


 80 meq/Magnesium


 Sulfate 5 meq/


 Multivitamins 10


 ml/Chromium/


 Copper/Manganese/


 Seleni/Zn 1 ml/


 Insulin Human


 Regular 20 unit/


 Total Parenteral


 Nutrition/Amino


 Acids/Dextrose/


 Fat Emulsion


 Intravenous  1,920 ml @ 


 80 mls/hr  TPN  CONT  6/5/20 22:00


 6/6/20 21:59 DC 6/5/20 21:59


80 MLS/HR


 


 Prochlorperazine


 Edisylate


  (Compazine)  5 mg  PACU PRN  PRN  4/27/20 07:00


 4/28/20 06:59 DC  





 


 Propofol


  (Diprivan)  200 mg  STK-MED ONCE  6/30/20 07:44


 6/30/20 07:44 DC  





 


 Ringer's Solution  1,000 ml @ 


 30 mls/hr  Q24H  4/27/20 07:00


 4/27/20 18:59 DC  





 


 Rocuronium Bromide


  (Zemuron)  100 mg  STK-MED ONCE  6/30/20 07:44


 6/30/20 07:44 DC  





 


 Saliva Substitute


  (Biotene


 Moisturizing


 Mouth)  2 spray  PRN Q15MIN  PRN  5/21/20 11:00


     





 


 Sevoflurane


  (Ultane)  60 ml  STK-MED ONCE  4/27/20 12:26


 4/27/20 12:27 DC  





 


 Sodium


 Bicarbonate 150


 meq/Dextrose  1,150 ml @ 


 75 mls/hr  1X  ONCE  6/30/20 16:30


 7/1/20 07:49 DC 6/30/20 20:02


75 MLS/HR


 


 Sodium


 Bicarbonate 50


 meq/Sodium


 Chloride  1,050 ml @ 


 75 mls/hr  Q14H  3/18/20 07:30


 3/23/20 10:28 DC 3/22/20 21:10


75 MLS/HR


 


 Sodium Acetate 50


 meq/Potassium


 Acetate 55 meq/


 Magnesium Sulfate


 20 meq/Calcium


 Gluconate 10 meq/


 Multivitamins 10


 ml/Chromium/


 Copper/Manganese/


 Seleni/Zn 0.5 ml/


 Insulin Human


 Regular 35 unit/


 Total Parenteral


 Nutrition/Amino


 Acids/Dextrose/


 Fat Emulsion


 Intravenous  1,800 ml @ 


 75 mls/hr  TPN  CONT  4/25/20 22:00


 4/26/20 21:59 DC 4/25/20 22:03


75 MLS/HR


 


 Sodium Bicarbonate


  (Sodium Bicarb


 Adult 8.4% Syr)  100 meq  1X  ONCE  6/30/20 16:30


 6/30/20 16:31 DC 6/30/20 17:07


100 MEQ


 


 Sodium Chloride


  (Normal Saline


 Flush)  3 ml  QSHIFT  PRN  6/30/20 14:45


     





 


 Sodium Chloride


 80 meq/Potassium


 Chloride 30 meq/


 Potassium Acetate


 30 meq/Magnesium


 Sulfate 14 meq/


 Multivitamins 10


 ml/Chromium/


 Copper/Manganese/


 Seleni/Zn 1 ml/


 Insulin Human


 Regular 15 unit/


 Total Parenteral


 Nutrition/Amino


 Acids/Dextrose/


 Fat Emulsion


 Intravenous  1,920 ml @ 


 80 mls/hr  TPN  CONT  7/1/20 22:00


 7/2/20 21:59 DC 7/1/20 23:05


80 MLS/HR


 


 Sodium Chloride


 90 meq/Calcium


 Gluconate 10 meq/


 Multivitamins 10


 ml/Chromium/


 Copper/Manganese/


 Seleni/Zn 0.5 ml/


 Total Parenteral


 Nutrition/Amino


 Acids/Dextrose/


 Fat Emulsion


 Intravenous  1,512 ml @ 


 63 mls/hr  TPN  CONT  3/18/20 22:00


 3/19/20 21:59 DC 3/18/20 22:06


63 MLS/HR


 


 Sodium Chloride


 90 meq/Calcium


 Gluconate 10 meq/


 Multivitamins 10


 ml/Chromium/


 Copper/Manganese/


 Seleni/Zn 1 ml/


 Total Parenteral


 Nutrition/Amino


 Acids/Dextrose/


 Fat Emulsion


 Intravenous  55.005 ml


  @ 2.292


 mls/hr  TPN  CONT  3/18/20 22:00


 3/18/20 12:33 DC  





 


 Sodium Chloride


 90 meq/Magnesium


 Sulfate 10 meq/


 Calcium Gluconate


 20 meq/


 Multivitamins 10


 ml/Chromium/


 Copper/Manganese/


 Seleni/Zn 0.5 ml/


 Total Parenteral


 Nutrition/Amino


 Acids/Dextrose/


 Fat Emulsion


 Intravenous  1,512 ml @ 


 63 mls/hr  TPN  CONT  3/19/20 22:00


 3/20/20 21:59 DC 3/19/20 22:25


63 MLS/HR


 


 Sodium Chloride


 90 meq/Magnesium


 Sulfate 12 meq/


 Calcium Gluconate


 15 meq/


 Multivitamins 10


 ml/Chromium/


 Copper/Manganese/


 Seleni/Zn 0.5 ml/


 Insulin Human


 Regular 25 unit/


 Total Parenteral


 Nutrition/Amino


 Acids/Dextrose/


 Fat Emulsion


 Intravenous  1,400 ml @ 


 58.333 mls/


 hr  TPN  CONT  4/8/20 22:00


 4/9/20 21:59 DC 4/8/20 21:41


58.333 MLS/HR


 


 Sodium Chloride


 90 meq/Potassium


 Chloride 15 meq/


 Magnesium Sulfate


 12 meq/Calcium


 Gluconate 15 meq/


 Multivitamins 10


 ml/Chromium/


 Copper/Manganese/


 Seleni/Zn 0.5 ml/


 Insulin Human


 Regular 25 unit/


 Total Parenteral


 Nutrition/Amino


 Acids/Dextrose/


 Fat Emulsion


 Intravenous  1,400 ml @ 


 58.333 mls/


 hr  TPN  CONT  4/7/20 22:00


 4/8/20 21:59 DC 4/7/20 22:13


58.333 MLS/HR


 


 Sodium Chloride


 90 meq/Potassium


 Chloride 15 meq/


 Potassium


 Phosphate 10 mmol/


 Magnesium Sulfate


 8 meq/Calcium


 Gluconate 15 meq/


 Multivitamins 10


 ml/Chromium/


 Copper/Manganese/


 Seleni/Zn 0.5 ml/


 Insulin Human


 Regular 25 unit/


 Total Parenteral


 Nutrition/Amino


 Acids/Dextrose/


 Fat Emulsion


 Intravenous  1,400 ml @ 


 58.333 mls/


 hr  TPN  CONT  4/5/20 22:00


 4/6/20 21:59 DC 4/5/20 21:20


58.333 MLS/HR


 


 Sodium Chloride


 90 meq/Potassium


 Chloride 15 meq/


 Potassium


 Phosphate 10 mmol/


 Magnesium Sulfate


 10 meq/Calcium


 Gluconate 20 meq/


 Multivitamins 10


 ml/Chromium/


 Copper/Manganese/


 Seleni/Zn 0.5 ml/


 Total Parenteral


 Nutrition/Amino


 Acids/Dextrose/


 Fat Emulsion


 Intravenous  1,400 ml @ 


 58.333 mls/


 hr  TPN  CONT  3/23/20 22:00


 3/24/20 21:59 DC 3/23/20 21:42


58.333 MLS/HR


 


 Sodium Chloride


 90 meq/Potassium


 Chloride 15 meq/


 Potassium


 Phosphate 10 mmol/


 Magnesium Sulfate


 12 meq/Calcium


 Gluconate 15 meq/


 Multivitamins 10


 ml/Chromium/


 Copper/Manganese/


 Seleni/Zn 0.5 ml/


 Insulin Human


 Regular 25 unit/


 Total Parenteral


 Nutrition/Amino


 Acids/Dextrose/


 Fat Emulsion


 Intravenous  1,400 ml @ 


 58.333 mls/


 hr  TPN  CONT  4/6/20 22:00


 4/7/20 21:59 DC 4/6/20 22:24


58.333 MLS/HR


 


 Sodium Chloride


 90 meq/Potassium


 Chloride 15 meq/


 Potassium


 Phosphate 15 mmol/


 Magnesium Sulfate


 10 meq/Calcium


 Gluconate 15 meq/


 Multivitamins 10


 ml/Chromium/


 Copper/Manganese/


 Seleni/Zn 0.5 ml/


 Total Parenteral


 Nutrition/Amino


 Acids/Dextrose/


 Fat Emulsion


 Intravenous  1,400 ml @ 


 58.333 mls/


 hr  TPN  CONT  3/24/20 22:00


 3/25/20 21:59 DC 3/24/20 22:17


58.333 MLS/HR


 


 Sodium Chloride


 90 meq/Potassium


 Chloride 15 meq/


 Potassium


 Phosphate 15 mmol/


 Magnesium Sulfate


 10 meq/Calcium


 Gluconate 20 meq/


 Multivitamins 10


 ml/Chromium/


 Copper/Manganese/


 Seleni/Zn 0.5 ml/


 Total Parenteral


 Nutrition/Amino


 Acids/Dextrose/


 Fat Emulsion


 Intravenous  1,200 ml @ 


 50 mls/hr  TPN  CONT  3/22/20 22:00


 3/22/20 14:17 DC  





 


 Sodium Chloride


 90 meq/Potassium


 Chloride 15 meq/


 Potassium


 Phosphate 18 mmol/


 Magnesium Sulfate


 8 meq/Calcium


 Gluconate 15 meq/


 Multivitamins 10


 ml/Chromium/


 Copper/Manganese/


 Seleni/Zn 0.5 ml/


 Insulin Human


 Regular 10 unit/


 Total Parenteral


 Nutrition/Amino


 Acids/Dextrose/


 Fat Emulsion


 Intravenous  1,400 ml @ 


 58.333 mls/


 hr  TPN  CONT  3/27/20 22:00


 3/28/20 21:59 DC 3/27/20 21:43


58.333 MLS/HR


 


 Sodium Chloride


 90 meq/Potassium


 Chloride 15 meq/


 Potassium


 Phosphate 18 mmol/


 Magnesium Sulfate


 8 meq/Calcium


 Gluconate 15 meq/


 Multivitamins 10


 ml/Chromium/


 Copper/Manganese/


 Seleni/Zn 0.5 ml/


 Insulin Human


 Regular 15 unit/


 Total Parenteral


 Nutrition/Amino


 Acids/Dextrose/


 Fat Emulsion


 Intravenous  1,400 ml @ 


 58.333 mls/


 hr  TPN  CONT  3/30/20 22:00


 3/31/20 21:59 DC 3/30/20 21:47


58.333 MLS/HR


 


 Sodium Chloride


 90 meq/Potassium


 Chloride 15 meq/


 Potassium


 Phosphate 18 mmol/


 Magnesium Sulfate


 8 meq/Calcium


 Gluconate 15 meq/


 Multivitamins 10


 ml/Chromium/


 Copper/Manganese/


 Seleni/Zn 0.5 ml/


 Insulin Human


 Regular 20 unit/


 Total Parenteral


 Nutrition/Amino


 Acids/Dextrose/


 Fat Emulsion


 Intravenous  1,400 ml @ 


 58.333 mls/


 hr  TPN  CONT  4/2/20 22:00


 4/3/20 21:59 DC 4/2/20 22:45


58.333 MLS/HR


 


 Sodium Chloride


 90 meq/Potassium


 Chloride 15 meq/


 Potassium


 Phosphate 18 mmol/


 Magnesium Sulfate


 8 meq/Calcium


 Gluconate 15 meq/


 Multivitamins 10


 ml/Chromium/


 Copper/Manganese/


 Seleni/Zn 0.5 ml/


 Total Parenteral


 Nutrition/Amino


 Acids/Dextrose/


 Fat Emulsion


 Intravenous  1,400 ml @ 


 58.333 mls/


 hr  TPN  CONT  3/26/20 22:00


 3/27/20 21:59 DC 3/26/20 22:00


58.333 MLS/HR


 


 Sodium Chloride


 90 meq/Potassium


 Chloride 30 meq/


 Potassium Acetate


 30 meq/Magnesium


 Sulfate 15 meq/


 Multivitamins 10


 ml/Chromium/


 Copper/Manganese/


 Seleni/Zn 1 ml/


 Insulin Human


 Regular 15 unit/


 Total Parenteral


 Nutrition/Amino


 Acids/Dextrose/


 Fat Emulsion


 Intravenous  1,680 ml @ 


 70 mls/hr  TPN  CONT  7/16/20 22:00


 7/17/20 21:59 DC 7/16/20 22:06


70 MLS/HR


 


 Sodium Chloride


 90 meq/Potassium


 Phosphate 15 mmol/


 Magnesium Sulfate


 12 meq/Calcium


 Gluconate 15 meq/


 Multivitamins 10


 ml/Chromium/


 Copper/Manganese/


 Seleni/Zn 0.5 ml/


 Insulin Human


 Regular 30 unit/


 Total Parenteral


 Nutrition/Amino


 Acids/Dextrose/


 Fat Emulsion


 Intravenous  1,400 ml @ 


 58.333 mls/


 hr  TPN  CONT  4/10/20 22:00


 4/11/20 21:59 DC 4/10/20 21:49


58.333 MLS/HR


 


 Sodium Chloride


 90 meq/Potassium


 Phosphate 15 mmol/


 Magnesium Sulfate


 12 meq/Calcium


 Gluconate 15 meq/


 Multivitamins 10


 ml/Chromium/


 Copper/Manganese/


 Seleni/Zn 0.5 ml/


 Insulin Human


 Regular 40 unit/


 Total Parenteral


 Nutrition/Amino


 Acids/Dextrose/


 Fat Emulsion


 Intravenous  1,400 ml @ 


 58.333 mls/


 hr  TPN  CONT  4/11/20 22:00


 4/12/20 21:59 DC 4/11/20 21:21


58.333 MLS/HR


 


 Sodium Chloride


 90 meq/Potassium


 Phosphate 19 mmol/


 Magnesium Sulfate


 12 meq/Calcium


 Gluconate 15 meq/


 Multivitamins 10


 ml/Chromium/


 Copper/Manganese/


 Seleni/Zn 0.5 ml/


 Insulin Human


 Regular 40 unit/


 Total Parenteral


 Nutrition/Amino


 Acids/Dextrose/


 Fat Emulsion


 Intravenous  1,400 ml @ 


 58.333 mls/


 hr  TPN  CONT  4/12/20 22:00


 4/13/20 21:59 DC 4/12/20 21:54


58.333 MLS/HR


 


 Sodium Chloride


 90 meq/Potassium


 Phosphate 5 mmol/


 Magnesium Sulfate


 12 meq/Calcium


 Gluconate 15 meq/


 Multivitamins 10


 ml/Chromium/


 Copper/Manganese/


 Seleni/Zn 0.5 ml/


 Insulin Human


 Regular 30 unit/


 Total Parenteral


 Nutrition/Amino


 Acids/Dextrose/


 Fat Emulsion


 Intravenous  1,400 ml @ 


 58.333 mls/


 hr  TPN  CONT  4/9/20 22:00


 4/10/20 21:59 DC 4/9/20 22:08


58.333 MLS/HR


 


 Sodium Chloride


 100 meq/Potassium


 Chloride 30 meq/


 Potassium Acetate


 30 meq/Magnesium


 Sulfate 12 meq/


 Multivitamins 10


 ml/Chromium/


 Copper/Manganese/


 Seleni/Zn 1 ml/


 Insulin Human


 Regular 15 unit/


 Total Parenteral


 Nutrition/Amino


 Acids/Dextrose/


 Fat Emulsion


 Intravenous  1,680 ml @ 


 70 mls/hr  TPN  CONT  7/5/20 22:00


 7/6/20 21:59 DC 7/5/20 21:23


70 MLS/HR


 


 Sodium Chloride


 100 meq/Potassium


 Chloride 40 meq/


 Magnesium Sulfate


 15 meq/Calcium


 Gluconate 15 meq/


 Multivitamins 10


 ml/Chromium/


 Copper/Manganese/


 Seleni/Zn 0.5 ml/


 Insulin Human


 Regular 35 unit/


 Total Parenteral


 Nutrition/Amino


 Acids/Dextrose/


 Fat Emulsion


 Intravenous  1,400 ml @ 


 58.333 mls/


 hr  TPN  CONT  4/19/20 22:00


 4/20/20 21:59 DC 4/19/20 22:46


58.333 MLS/HR


 


 Sodium Chloride


 100 meq/Potassium


 Chloride 40 meq/


 Magnesium Sulfate


 20 meq/Calcium


 Gluconate 10 meq/


 Multivitamins 10


 ml/Chromium/


 Copper/Manganese/


 Seleni/Zn 0.5 ml/


 Insulin Human


 Regular 35 unit/


 Total Parenteral


 Nutrition/Amino


 Acids/Dextrose/


 Fat Emulsion


 Intravenous  1,400 ml @ 


 58.333 mls/


 hr  TPN  CONT  4/23/20 22:00


 4/24/20 21:59 DC 4/24/20 00:06


58.333 MLS/HR


 


 Sodium Chloride


 100 meq/Potassium


 Chloride 40 meq/


 Magnesium Sulfate


 20 meq/Calcium


 Gluconate 15 meq/


 Multivitamins 10


 ml/Chromium/


 Copper/Manganese/


 Seleni/Zn 0.5 ml/


 Insulin Human


 Regular 35 unit/


 Total Parenteral


 Nutrition/Amino


 Acids/Dextrose/


 Fat Emulsion


 Intravenous  1,400 ml @ 


 58.333 mls/


 hr  TPN  CONT  4/22/20 22:00


 4/23/20 21:59 DC 4/22/20 22:27


58.333 MLS/HR


 


 Sodium Chloride


 100 meq/Potassium


 Phosphate 10 mmol/


 Magnesium Sulfate


 12 meq/Calcium


 Gluconate 15 meq/


 Multivitamins 10


 ml/Chromium/


 Copper/Manganese/


 Seleni/Zn 0.5 ml/


 Insulin Human


 Regular 35 unit/


 Potassium


 Chloride 20 meq/


 Total Parenteral


 Nutrition/Amino


 Acids/Dextrose/


 Fat Emulsion


 Intravenous  1,400 ml @ 


 58.333 mls/


 hr  TPN  CONT  4/16/20 22:00


 4/17/20 21:59 DC 4/16/20 22:10


58.333 MLS/HR


 


 Sodium Chloride


 100 meq/Potassium


 Phosphate 19 mmol/


 Magnesium Sulfate


 12 meq/Calcium


 Gluconate 15 meq/


 Multivitamins 10


 ml/Chromium/


 Copper/Manganese/


 Seleni/Zn 0.5 ml/


 Insulin Human


 Regular 40 unit/


 Potassium


 Chloride 20 meq/


 Total Parenteral


 Nutrition/Amino


 Acids/Dextrose/


 Fat Emulsion


 Intravenous  1,400 ml @ 


 58.333 mls/


 hr  TPN  CONT  4/15/20 22:00


 4/16/20 21:59 DC 4/15/20 21:20


58.333 MLS/HR


 


 Sodium Chloride


 100 meq/Potassium


 Phosphate 5 mmol/


 Magnesium Sulfate


 12 meq/Calcium


 Gluconate 15 meq/


 Multivitamins 10


 ml/Chromium/


 Copper/Manganese/


 Seleni/Zn 0.5 ml/


 Insulin Human


 Regular 35 unit/


 Potassium


 Chloride 20 meq/


 Total Parenteral


 Nutrition/Amino


 Acids/Dextrose/


 Fat Emulsion


 Intravenous  1,400 ml @ 


 58.333 mls/


 hr  TPN  CONT  4/17/20 22:00


 4/18/20 21:59 DC 4/17/20 22:59


58.333 MLS/HR


 


 Sodium Chloride


 110 meq/Potassium


 Chloride 30 meq/


 Potassium Acetate


 30 meq/Magnesium


 Sulfate 15 meq/


 Multivitamins 10


 ml/Chromium/


 Copper/Manganese/


 Seleni/Zn 1 ml/


 Insulin Human


 Regular 15 unit/


 Total Parenteral


 Nutrition/Amino


 Acids/Dextrose/


 Fat Emulsion


 Intravenous  1,680 ml @ 


 70 mls/hr  TPN  CONT  7/18/20 22:00


 7/19/20 21:59 DC 7/18/20 22:01


70 MLS/HR


 


 Sodium Chloride


 110 meq/Sodium


 Phosphate 10 mmol/


 Potassium


 Chloride 30 meq/


 Potassium Acetate


 30 meq/Magnesium


 Sulfate 15 meq/


 Multivitamins 10


 ml/Chromium/


 Copper/Manganese/


 Seleni/Zn 1 ml/


 Insulin Human


 Regular 15 unit/


 Total Parenteral


 Nutrition/Amino


 Acids/Dextrose/


 Fat Emulsion


 Intravenous  1,680 ml @ 


 70 mls/hr  TPN  CONT  7/19/20 22:00


 7/20/20 21:59 DC 7/19/20 22:03


70 MLS/HR


 


 Sodium Chloride


 120 meq/Sodium


 Phosphate 10 mmol/


 Potassium


 Chloride 30 meq/


 Potassium Acetate


 30 meq/Magnesium


 Sulfate 15 meq/


 Multivitamins 10


 ml/Chromium/


 Copper/Manganese/


 Seleni/Zn 1 ml/


 Insulin Human


 Regular 15 unit/


 Total Parenteral


 Nutrition/Amino


 Acids/Dextrose/


 Fat Emulsion


 Intravenous  1,680 ml @ 


 70 mls/hr  TPN  CONT  7/20/20 22:00


 7/21/20 21:59  7/20/20 22:08


70 MLS/HR


 


 Succinylcholine


 Chloride


  (Anectine)  120 mg  1X  ONCE  3/23/20 08:30


 3/23/20 08:31 DC 3/23/20 08:34


120 MG


 


 Vancomycin HCl


  (Vanco Per


 Pharmacy)  1 each  PRN DAILY  PRN  7/12/20 09:15


 7/15/20 07:41 DC 7/14/20 02:46


1 EACH


 


 Vancomycin HCl


  (Vancomycin


 Random Level)  1 each  1X  ONCE  7/14/20 01:00


 7/14/20 01:01 DC 7/14/20 01:00


1 EACH


 


 Vancomycin HCl


  (Vancomycin


 Trough Level)  1 each  1X  ONCE  7/15/20 09:30


 7/15/20 09:31 Cancel  





 


 Vancomycin HCl


 1.5 gm/Sodium


 Chloride  500 ml @ 


 250 mls/hr  Q12H  7/14/20 10:00


 7/15/20 07:41 DC 7/14/20 22:07


250 MLS/HR


 


 Vancomycin HCl 2


 gm/Sodium Chloride  500 ml @ 


 250 mls/hr  1X  ONCE  7/12/20 10:00


 7/12/20 11:59 DC 7/12/20 10:34


250 MLS/HR


 


 Vasopressin


  (Vasostrict)  20 unit  STK-MED ONCE  6/30/20 12:23


 6/30/20 12:23 DC  





 


 Vasopressin 20


 unit/Dextrose  101 ml @ 


 12 mls/hr  CONT  PRN  6/30/20 15:30


    7/7/20 04:17


12 MLS/HR


 


 Vecuronium Bromide


  (Norcuron Bolus)  6 mg  PRN Q6HRS  PRN  5/7/20 19:15


 5/7/20 19:35 DC  














Labs:


Lab





Laboratory Tests








Test


 7/20/20


14:39 7/20/20


18:03 7/20/20


23:59 7/21/20


05:22


 


Glucose (Fingerstick)


 151 mg/dL


(70-99) 128 mg/dL


(70-99) 138 mg/dL


(70-99) 141 mg/dL


(70-99)








Micro


        NEG ANSON 56


        PSEUDOMONAS AERUGINOSA


        ANTIBIOTIC                        RESULT          INTERPRETATION


        AMIKACIN                          <=16                  S


        AZTREONAM                         >16                   R


        CEFTAZIDIME                       >16                   R


        CIPROFLOXACIN                     <=0.25                S


        CEFEPIME                          16                    I


        GENTAMICIN                        <=2                   S


        LEVOFLOXACIN                      <=0.5                 S














                               ** CONTINUED ON NEXT PAGE **





---------------------------

-----------------------------------------------------------------





RUN DATE: 06/10/20                  [facility] *LIVE*               

  PAGE 2   


RUN TIME: 1121                            Specimen Inquiry                    


-----------------

---------------------------------------------------------------------------





SPEC: [id number]    PATIENT: JESENIA BEAN                [id number]  

(Continued)


 

--------------------------------------------------------------------------------


------------








 

--------------------------------------------------------------------------------


------------





  Procedure                         Result                                      

         


------------------------------------------------------------------

--------------------------





  ANTIMICROBIAL SUSCEPTIBILITY  Preliminary   (continued)


        MEROPENEM                         <=1                   S


        PIPERACILLIN/TAZOBACTAM           64                    S


        TOBRAMYCIN                        <=2                   S


        Unless otherwise specified, Testing Performed by:


        [address] [address]


        For Inquires, the Physician may contact the Microbiology


        department at [phone]





 

--------------------------------------------------------------------------------


------------





Objective:


Assessment:


Patient with prolonged hospitalization more than 3 months


Multiple medical problems


Multiple surgical procedures





S/P Exp. 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 

parapsilosis & PSAE (I-merrem/ceftazidime/AZT/cefepime))


Leucocytosis -trending upward 


Fever 


Acute gallstone pancreatitis with persistent necrosis


  - 4/9.  CT A/P Increased ascites. Persistent evidence of necrotizing 

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


  - 4/27. status post ROBERT drain placement; C. parapsilosis. s/p drain 5/6 + yeast

& high amylase; s/p additional drain on 5/8. Drains removed. 


  -5/6. fluid  candida parapsilosis fluid, amylase high


  - 6/6 showed multiple pseudocysts, slight larger on the right. s/p drains x 3,

6/7.  + PSAE (MDRO-R Cefepime, Zosyn ANSON < 64) and yeast, 


  -6/7 s/p drain replacement x 3; fluid cult PSAE (MDRO), yeast; treated


  -7/12 CT A/P shows smaller fluid collections.         


Ascites s/p paracentesis 4/15 & 5/6. C. parapsilosis 


Cholelithiasis with thickening of the gallbladder wall.


JED, Hyperkalemia, Metabolic acidosis off dialysis


Acute hypoxic resp failure. trach/vent. sputum 6/13  + PSAE (I merrem) 


Pleural effusion status post CTS left side


 


Abdominal fluid culture 6 /7 MDRO Pseudomonas, yeast





Plan:


Plan of Care


DC Meropenem, cipro (7/12), 


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


cont micafungin and dapto


CK 11 on 7/19


repeat bc


f/u psae in sputum


BC from 7/15 neg to date 


Monitor WBC/temp 


Wound care /drain management as directed


Leucocytosis could be from not adequate drainage of intraabdo fluid/abscess ...


Gen surgery following


May need repeat ct abdomen and pelvis as there is concern about adequate 

drainage from drain tubes per team


C diff neg 7/12


Contact isolation for CRE/MDRO


D/w nursing 





Critically ill





Long term prognosis  poor











IVAN FRANZ MD           Jul 21, 2020 08:02

## 2020-07-21 NOTE — RAD
EXAM: CT ABDOMEN/PELVIS WITH CONTRAST.

 

HISTORY: Fever.

 

TECHNIQUE: Computed tomography of the abdomen and pelvis was performed 

after the intravenous administration of iodinated contrast. One or more of

the following individualized dose reduction techniques were utilized for 

this examination:  

1. Automated exposure control.  

2. Adjustment of the mA and/or kV according to patient size.  

3. Use of iterative reconstruction technique.

 

COMPARISON: None.

 

FINDINGS: Lung windows through the visualized portions of the bases reveal

a left pleural drain. A right pleural effusion is small to moderate. There

is atelectasis or infiltrate throughout both lung bases. Bone windows 

reveal no suspicious lesions. Bilateral breast implants are noted. A 

central venous catheter tip is noted in the superior vena cava. The 

bladder is decompressed by a [place] catheter.

 

Multiple percutaneous drains are noted throughout the abdomen and pelvis. 

One fills the subcutaneous compartment along an anterior abdominal 

incision. There is no surrounding fluid collection.

 

Another in the right upper quadrant was under the liver and. There is no 

surrounding collection. The

 

Another on the left lower quadrant enters the collection in the left 

paracolic gutter/retroperitoneum and measures 16 cm craniocaudally and 8.4

x 4.3 cm transaxially. This is not clearly changed in size. It crosses the

midline pelvis to communicate with another larger similar collection in 

the right paracolic gutter and retroperitoneum that measures 19 cm 

craniocaudally by 11.5 x 8.1 cm transaxially. It also contains a 

percutaneous drain. This collection communicates with a collection that 

enters the right anterior pararenal space and retroperitoneum along the 

anterior aspect of the body and tail of the pancreas. A drain has been 

removed from this collection since the prior study. The collection 

persists, measuring approximately 3 cm short axis, but 20 cm long. It also

communicates with the left paracolic gutter collection superiorly.

 

A small amount of free pelvic fluid does not appear loculated. An 

air-fluid level in the rectum is consistent with diarrhea. There is at 

least mild diffuse colonic wall thickening. There is no small bowel 

obstruction.

 

A gastrojejunostomy catheter has its tip in the left upper quadrant. A 

hepatic cyst is likely benign and measures 1.3 cm. The spleen, 

gallbladder, adrenal glands and kidneys are unremarkable. The pancreatic 

parenchyma is small, consistent with necrosis/atrophy. The pancreatic duct

is not dilated.

 

IMPRESSION: 

1. Colonic wall thickening with findings suggesting diarrhea. Correlate 

for colitis.

2. Extensive retroperitoneal fluid collections persist. Percutaneous 

drains remain within the collections in both paracolic gutters. These 

communicate with additional pelvic and peripancreatic collections.

3. Small to moderate right pleural effusion. No left pleural fluid is 

detectable in this field-of-view with a drain in place. Bibasilar 

atelectasis or infiltrate.

 

Electronically signed by: ASIF Tripathi MD (7/21/2020 3:22 PM) 

[id number]

## 2020-07-21 NOTE — NUR
Pharmacy TPN Dosing Note



S: JESENIA BEAN is a 49 year old F Currently receiving Central Continuous TPN started 
03/18/20



B:Pertinent PMH: 

Necrotizing pancreatitis

Height: 5 feet, 8 inches

Weight: 88.5 kg



Current diet: NPO 



LABS:

Sodium:    136 

Potassium: 4.2 

Chloride:  102 

Calcium:   9.6 

Corrected Calcium: 11.92 

Magnesium: 2.1 

CO2:       30 

SCr:       0.5 

Glucose:   120-129 

Albumin:   1.1 

AST:       25 

ALT:       37 



TPN FORMULA:

TPN TYPE:  Central Continuous

AMINO ACIDS:         85 gm

DEXTROSE:            250 gm

LIPIDS:              20 gm

SODIUM CHLORIDE:     120 mEq

SODIUM ACETATE:      - mEq

SODIUM PHOSPHATE:    10 mmol

POTASSIUM CHLORIDE:  30 mEq

POTASSIUM ACETATE:   30 mEq

POTASSIUM PHOSPHATE: - mmol

MAGNESIUM:           15 mEq

CALCIUM:             - mEq

INSULIN:             15 units

MULTIPLE VITAMIN:    10 ml

TRACE ELEMENTS:      1 ml ml(s)



TPN PLAN:  

No new labs today; labs Mon/Thu due to stability. No changes to TPN.





R: Continue TPN AS ABOVE.

Will monitor electrolytes, glucose, and tolerance to TPN.



 CANDACE BELTRE RPH, 07/21/20 1111

## 2020-07-22 VITALS — DIASTOLIC BLOOD PRESSURE: 71 MMHG | SYSTOLIC BLOOD PRESSURE: 119 MMHG

## 2020-07-22 VITALS — SYSTOLIC BLOOD PRESSURE: 137 MMHG | DIASTOLIC BLOOD PRESSURE: 85 MMHG

## 2020-07-22 VITALS — DIASTOLIC BLOOD PRESSURE: 85 MMHG | SYSTOLIC BLOOD PRESSURE: 137 MMHG

## 2020-07-22 VITALS — SYSTOLIC BLOOD PRESSURE: 109 MMHG | DIASTOLIC BLOOD PRESSURE: 66 MMHG

## 2020-07-22 VITALS — SYSTOLIC BLOOD PRESSURE: 164 MMHG | DIASTOLIC BLOOD PRESSURE: 93 MMHG

## 2020-07-22 VITALS — DIASTOLIC BLOOD PRESSURE: 71 MMHG | SYSTOLIC BLOOD PRESSURE: 118 MMHG

## 2020-07-22 VITALS — SYSTOLIC BLOOD PRESSURE: 119 MMHG | DIASTOLIC BLOOD PRESSURE: 79 MMHG

## 2020-07-22 VITALS — SYSTOLIC BLOOD PRESSURE: 154 MMHG | DIASTOLIC BLOOD PRESSURE: 79 MMHG

## 2020-07-22 VITALS — DIASTOLIC BLOOD PRESSURE: 81 MMHG | SYSTOLIC BLOOD PRESSURE: 125 MMHG

## 2020-07-22 LAB
BASOPHILS # BLD AUTO: 0 X10^3/UL (ref 0–0.2)
BASOPHILS NFR BLD: 0 % (ref 0–3)
EOSINOPHIL NFR BLD: 0.6 X10^3/UL (ref 0–0.7)
EOSINOPHIL NFR BLD: 3 % (ref 0–3)
ERYTHROCYTE [DISTWIDTH] IN BLOOD BY AUTOMATED COUNT: 15.9 % (ref 11.5–14.5)
HCT VFR BLD CALC: 21.2 % (ref 36–47)
HGB BLD-MCNC: 6.9 G/DL (ref 12–15.5)
LYMPHOCYTES # BLD: 1.3 X10^3/UL (ref 1–4.8)
LYMPHOCYTES NFR BLD AUTO: 8 % (ref 24–48)
MCH RBC QN AUTO: 28 PG (ref 25–35)
MCHC RBC AUTO-ENTMCNC: 33 G/DL (ref 31–37)
MCV RBC AUTO: 86 FL (ref 79–100)
MONO #: 0.8 X10^3/UL (ref 0–1.1)
MONOCYTES NFR BLD: 5 % (ref 0–9)
NEUT #: 14 X10^3/UL (ref 1.8–7.7)
NEUTROPHILS NFR BLD AUTO: 84 % (ref 31–73)
PLATELET # BLD AUTO: 583 X10^3/UL (ref 140–400)
RBC # BLD AUTO: 2.48 X10^6/UL (ref 3.5–5.4)
WBC # BLD AUTO: 16.7 X10^3/UL (ref 4–11)

## 2020-07-22 RX ADMIN — FENTANYL CITRATE PRN MCG: 50 INJECTION INTRAMUSCULAR; INTRAVENOUS at 13:03

## 2020-07-22 RX ADMIN — FENTANYL CITRATE PRN MCG: 50 INJECTION INTRAMUSCULAR; INTRAVENOUS at 21:18

## 2020-07-22 RX ADMIN — BACITRACIN SCH MLS/HR: 5000 INJECTION, POWDER, FOR SOLUTION INTRAMUSCULAR at 14:33

## 2020-07-22 RX ADMIN — INSULIN LISPRO SCH UNITS: 100 INJECTION, SOLUTION INTRAVENOUS; SUBCUTANEOUS at 05:32

## 2020-07-22 RX ADMIN — FENTANYL SCH PATCH: 12.5 PATCH TRANSDERMAL at 09:00

## 2020-07-22 RX ADMIN — CEFEPIME SCH GM: 2 INJECTION, POWDER, FOR SOLUTION INTRAVENOUS at 20:53

## 2020-07-22 RX ADMIN — PANTOPRAZOLE SODIUM SCH MG: 40 INJECTION, POWDER, FOR SOLUTION INTRAVENOUS at 09:01

## 2020-07-22 RX ADMIN — ONDANSETRON PRN MG: 2 INJECTION INTRAMUSCULAR; INTRAVENOUS at 09:03

## 2020-07-22 RX ADMIN — INSULIN LISPRO SCH UNITS: 100 INJECTION, SOLUTION INTRAVENOUS; SUBCUTANEOUS at 12:00

## 2020-07-22 RX ADMIN — IPRATROPIUM BROMIDE AND ALBUTEROL SULFATE SCH ML: .5; 3 SOLUTION RESPIRATORY (INHALATION) at 03:44

## 2020-07-22 RX ADMIN — DEXTROSE SCH MLS/HR: 50 INJECTION, SOLUTION INTRAVENOUS at 09:04

## 2020-07-22 RX ADMIN — DAPTOMYCIN SCH MLS/HR: 500 INJECTION, POWDER, LYOPHILIZED, FOR SOLUTION INTRAVENOUS at 09:04

## 2020-07-22 RX ADMIN — ACETYLCYSTEINE SCH MG: 200 INHALANT RESPIRATORY (INHALATION) at 08:00

## 2020-07-22 RX ADMIN — METOPROLOL TARTRATE PRN MG: 5 INJECTION INTRAVENOUS at 18:35

## 2020-07-22 RX ADMIN — ACETYLCYSTEINE SCH MG: 200 INHALANT RESPIRATORY (INHALATION) at 20:00

## 2020-07-22 RX ADMIN — CEFTAZIDIME, AVIBACTAM SCH MLS/HR: 2; .5 POWDER, FOR SOLUTION INTRAVENOUS at 05:32

## 2020-07-22 RX ADMIN — ENOXAPARIN SODIUM SCH MG: 40 INJECTION SUBCUTANEOUS at 09:02

## 2020-07-22 RX ADMIN — ONDANSETRON PRN MG: 2 INJECTION INTRAMUSCULAR; INTRAVENOUS at 01:40

## 2020-07-22 RX ADMIN — INSULIN LISPRO SCH UNITS: 100 INJECTION, SOLUTION INTRAVENOUS; SUBCUTANEOUS at 00:00

## 2020-07-22 RX ADMIN — IPRATROPIUM BROMIDE AND ALBUTEROL SULFATE SCH ML: .5; 3 SOLUTION RESPIRATORY (INHALATION) at 12:00

## 2020-07-22 RX ADMIN — Medication PRN EACH: at 09:24

## 2020-07-22 RX ADMIN — CEFEPIME SCH GM: 2 INJECTION, POWDER, FOR SOLUTION INTRAVENOUS at 09:02

## 2020-07-22 RX ADMIN — INSULIN LISPRO SCH UNITS: 100 INJECTION, SOLUTION INTRAVENOUS; SUBCUTANEOUS at 17:44

## 2020-07-22 RX ADMIN — IPRATROPIUM BROMIDE AND ALBUTEROL SULFATE SCH ML: .5; 3 SOLUTION RESPIRATORY (INHALATION) at 16:26

## 2020-07-22 RX ADMIN — FENTANYL CITRATE PRN MCG: 50 INJECTION INTRAMUSCULAR; INTRAVENOUS at 18:37

## 2020-07-22 RX ADMIN — IPRATROPIUM BROMIDE AND ALBUTEROL SULFATE SCH ML: .5; 3 SOLUTION RESPIRATORY (INHALATION) at 08:22

## 2020-07-22 RX ADMIN — IPRATROPIUM BROMIDE AND ALBUTEROL SULFATE SCH ML: .5; 3 SOLUTION RESPIRATORY (INHALATION) at 20:32

## 2020-07-22 RX ADMIN — ACETAMINOPHEN PRN MG: 650 SUPPOSITORY RECTAL at 20:53

## 2020-07-22 NOTE — PDOC
G I PROGRESS NOTE


Subjective


Sleeping, not awakened.


Objective


Others' notes reviewed.


Physical Exam


No PE.


Review of Relevant


I have reviewed the following items josy (where applicable) has been applied.


Labs





Laboratory Tests








Test


 7/20/20


14:39 7/20/20


18:03 7/20/20


23:59 7/21/20


05:22


 


Glucose (Fingerstick)


 151 mg/dL


(70-99) 128 mg/dL


(70-99) 138 mg/dL


(70-99) 141 mg/dL


(70-99)


 


Test


 7/21/20


12:21 7/21/20


18:19 7/22/20


00:09 7/22/20


05:30


 


Glucose (Fingerstick)


 143 mg/dL


(70-99) 146 mg/dL


(70-99) 147 mg/dL


(70-99) 





 


White Blood Count


 


 


 


 16.7 x10^3/uL


(4.0-11.0)


 


Red Blood Count


 


 


 


 2.48 x10^6/uL


(3.50-5.40)


 


Hemoglobin


 


 


 


 6.9 g/dL


(12.0-15.5)


 


Hematocrit


 


 


 


 21.2 %


(36.0-47.0)


 


Mean Corpuscular Volume    86 fL () 


 


Mean Corpuscular Hemoglobin    28 pg (25-35) 


 


Mean Corpuscular Hemoglobin


Concent 


 


 


 33 g/dL


(31-37)


 


Red Cell Distribution Width


 


 


 


 15.9 %


(11.5-14.5)


 


Platelet Count


 


 


 


 583 x10^3/uL


(140-400)


 


Neutrophils (%) (Auto)    84 % (31-73) 


 


Lymphocytes (%) (Auto)    8 % (24-48) 


 


Monocytes (%) (Auto)    5 % (0-9) 


 


Eosinophils (%) (Auto)    3 % (0-3) 


 


Basophils (%) (Auto)    0 % (0-3) 


 


Neutrophils # (Auto)


 


 


 


 14.0 x10^3/uL


(1.8-7.7)


 


Lymphocytes # (Auto)


 


 


 


 1.3 x10^3/uL


(1.0-4.8)


 


Monocytes # (Auto)


 


 


 


 0.8 x10^3/uL


(0.0-1.1)


 


Eosinophils # (Auto)


 


 


 


 0.6 x10^3/uL


(0.0-0.7)


 


Basophils # (Auto)


 


 


 


 0.0 x10^3/uL


(0.0-0.2)


 


Test


 7/22/20


05:31 


 


 





 


Glucose (Fingerstick)


 150 mg/dL


(70-99) 


 


 











Laboratory Tests








Test


 7/21/20


12:21 7/21/20


18:19 7/22/20


00:09 7/22/20


05:30


 


Glucose (Fingerstick)


 143 mg/dL


(70-99) 146 mg/dL


(70-99) 147 mg/dL


(70-99) 





 


White Blood Count


 


 


 


 16.7 x10^3/uL


(4.0-11.0)


 


Red Blood Count


 


 


 


 2.48 x10^6/uL


(3.50-5.40)


 


Hemoglobin


 


 


 


 6.9 g/dL


(12.0-15.5)


 


Hematocrit


 


 


 


 21.2 %


(36.0-47.0)


 


Mean Corpuscular Volume    86 fL () 


 


Mean Corpuscular Hemoglobin    28 pg (25-35) 


 


Mean Corpuscular Hemoglobin


Concent 


 


 


 33 g/dL


(31-37)


 


Red Cell Distribution Width


 


 


 


 15.9 %


(11.5-14.5)


 


Platelet Count


 


 


 


 583 x10^3/uL


(140-400)


 


Neutrophils (%) (Auto)    84 % (31-73) 


 


Lymphocytes (%) (Auto)    8 % (24-48) 


 


Monocytes (%) (Auto)    5 % (0-9) 


 


Eosinophils (%) (Auto)    3 % (0-3) 


 


Basophils (%) (Auto)    0 % (0-3) 


 


Neutrophils # (Auto)


 


 


 


 14.0 x10^3/uL


(1.8-7.7)


 


Lymphocytes # (Auto)


 


 


 


 1.3 x10^3/uL


(1.0-4.8)


 


Monocytes # (Auto)


 


 


 


 0.8 x10^3/uL


(0.0-1.1)


 


Eosinophils # (Auto)


 


 


 


 0.6 x10^3/uL


(0.0-0.7)


 


Basophils # (Auto)


 


 


 


 0.0 x10^3/uL


(0.0-0.2)


 


Test


 7/22/20


05:31 


 


 





 


Glucose (Fingerstick)


 150 mg/dL


(70-99) 


 


 











Microbiology


7/21/20 Gram Stain - Final, Resulted


          


7/21/20 Aerobic and Anaerobic Culture - Preliminary, Resulted


          


7/19/20 Gram Stain Evaluation - Final, Complete


          


7/19/20 Respiratory Culture - Final, Complete


          


7/19/20 Antimicrobic Susceptibility - Final, Complete


          


7/15/20 Blood Culture - Final, Complete


          NO GROWTH AFTER 5 DAYS


6/30/20 Gram Stain - Final, Complete


          


6/30/20 Aerobic and Anaerobic Culture - Final, Complete


          


6/30/20 Antimicrobic Susceptibility - Final, Complete


          


6/7/20 Urine Culture - Final, Complete


         


5/30/20 Gram Stain - Final, Complete


          


5/30/20 Aerobic Culture - Final, Complete


          








White count down some.


Vitals/I & O





Vital Sign - Last 24 Hours








 7/21/20 7/21/20 7/21/20 7/21/20





 11:00 12:00 12:18 12:35


 


Temp  100.8  





  100.8  


 


Pulse 126 131  


 


Resp 32 35 39 


 


B/P (MAP) 121/75 (90) 128/83 (98)  


 


Pulse Ox 98 92 94 100


 


O2 Delivery Nasal Cannula Nasal Cannula Nasal Cannula Nasal Cannula


 


O2 Flow Rate 2.0 2.0 2.0 2.0


 


    





    





 7/21/20 7/21/20 7/21/20 7/21/20





 13:03 14:50 15:38 15:52


 


Resp 36 34 30 


 


Pulse Ox  99 99 100


 


O2 Delivery Nasal Cannula Nasal Cannula Nasal Cannula Nasal Cannula


 


O2 Flow Rate 2.0 2.0 2.0 2.0





 7/21/20 7/21/20 7/21/20 7/21/20





 16:00 16:41 17:11 19:42


 


Temp 98.0   





 98.0   


 


Pulse 137   


 


Resp 35 38 30 


 


B/P (MAP) 114/91 (99)   


 


Pulse Ox 96 100 97 100


 


O2 Delivery Nasal Cannula Nasal Cannula Nasal Cannula Nasal Cannula


 


O2 Flow Rate 1.0 2.1 1.0 1.0


 


    





    





 7/21/20 7/21/20 7/21/20 7/22/20





 20:00 20:00 23:07 00:00


 


Temp 98.4   98.7





 98.4   98.7


 


Pulse 120   120


 


Resp 25   23


 


B/P (MAP) 112/77 (89)   119/79 (92)


 


Pulse Ox 100  98 98


 


O2 Delivery Nasal Cannula Nasal Cannula Nasal Cannula Nasal Cannula


 


O2 Flow Rate 1.0 1.0 1.0 1.0


 


    





    





 7/22/20 7/22/20 7/22/20 7/22/20





 03:54 04:00 08:23 09:00


 


Temp  98.1  





  98.1  


 


Pulse  123  


 


Resp  29  


 


B/P (MAP)  109/66 (80)  


 


Pulse Ox 95 94 100 100


 


O2 Delivery Nasal Cannula Nasal Cannula Room Air Nasal Cannula


 


O2 Flow Rate 1.0 1.0  1.0














Intake and Output   


 


 7/21/20 7/21/20 7/22/20





 15:00 23:00 07:00


 


Intake Total 1186.7 ml 100 ml 1156 ml


 


Output Total 390 ml 1385 ml 820 ml


 


Balance 796.7 ml -1285 ml 336 ml








Quite a lot out of RUQ ROBERT (one in GB bed).


Images


Reviewed CT.  Still sizable fluid collection right mid/lower abdomen.  Gallstone

noted.


Problem List


Problems


Medical Problems:


(1) Acute pancreatitis


Status: Acute  





(2) Cholelithiasis


Status: Acute  





Assessment


Severe biliary pancreatitis/multiple complications and interventions.  Still 

with apparently undrained fluid collection and intermittent fever.


Plan of Care Note


Drain fluid?


Continue other per ID, Pulm, Surgery.





Justicifation of Admission Dx:


Justifications for Admission:


Justification of Admission Dx:  Yes











MARCO ANTONIO LONGO MD          Jul 22, 2020 10:53

## 2020-07-22 NOTE — PDOC
SURGICAL PROGRESS NOTE


Subjective


Pt looks much better, up in chair, interactive


Vital Signs





Vital Signs








  Date Time  Temp Pulse Resp B/P (MAP) Pulse Ox O2 Delivery O2 Flow Rate FiO2


 


7/22/20 12:40     100 Room Air  


 


7/22/20 11:41 100.4 139 32 125/81    





 100.4       


 


7/22/20 09:00       1.0 








I&O











Intake and Output 


 


 7/22/20





 06:59


 


Intake Total 2442.7 ml


 


Output Total 2595 ml


 


Balance -152.3 ml


 


 


 


IV Total 2442.7 ml


 


Output Urine Total 1330 ml


 


Gastric Drainage Total 400 ml


 


Chest Tube Drainage Total 90 ml


 


Drainage Total 775 ml








General:  Alert, Cooperative, No acute distress


Abdomen:  Soft, No tenderness, Other (drains with drainage)


Labs





Laboratory Tests








Test


 7/20/20


14:39 7/20/20


18:03 7/20/20


23:59 7/21/20


05:22


 


Glucose (Fingerstick)


 151 mg/dL


(70-99) 128 mg/dL


(70-99) 138 mg/dL


(70-99) 141 mg/dL


(70-99)


 


Test


 7/21/20


12:21 7/21/20


18:19 7/22/20


00:09 7/22/20


05:30


 


Glucose (Fingerstick)


 143 mg/dL


(70-99) 146 mg/dL


(70-99) 147 mg/dL


(70-99) 





 


White Blood Count


 


 


 


 16.7 x10^3/uL


(4.0-11.0)


 


Red Blood Count


 


 


 


 2.48 x10^6/uL


(3.50-5.40)


 


Hemoglobin


 


 


 


 6.9 g/dL


(12.0-15.5)


 


Hematocrit


 


 


 


 21.2 %


(36.0-47.0)


 


Mean Corpuscular Volume    86 fL () 


 


Mean Corpuscular Hemoglobin    28 pg (25-35) 


 


Mean Corpuscular Hemoglobin


Concent 


 


 


 33 g/dL


(31-37)


 


Red Cell Distribution Width


 


 


 


 15.9 %


(11.5-14.5)


 


Platelet Count


 


 


 


 583 x10^3/uL


(140-400)


 


Neutrophils (%) (Auto)    84 % (31-73) 


 


Lymphocytes (%) (Auto)    8 % (24-48) 


 


Monocytes (%) (Auto)    5 % (0-9) 


 


Eosinophils (%) (Auto)    3 % (0-3) 


 


Basophils (%) (Auto)    0 % (0-3) 


 


Neutrophils # (Auto)


 


 


 


 14.0 x10^3/uL


(1.8-7.7)


 


Lymphocytes # (Auto)


 


 


 


 1.3 x10^3/uL


(1.0-4.8)


 


Monocytes # (Auto)


 


 


 


 0.8 x10^3/uL


(0.0-1.1)


 


Eosinophils # (Auto)


 


 


 


 0.6 x10^3/uL


(0.0-0.7)


 


Basophils # (Auto)


 


 


 


 0.0 x10^3/uL


(0.0-0.2)


 


Test


 7/22/20


05:31 


 


 





 


Glucose (Fingerstick)


 150 mg/dL


(70-99) 


 


 











Laboratory Tests








Test


 7/21/20


18:19 7/22/20


00:09 7/22/20


05:30 7/22/20


05:31


 


Glucose (Fingerstick)


 146 mg/dL


(70-99) 147 mg/dL


(70-99) 


 150 mg/dL


(70-99)


 


White Blood Count


 


 


 16.7 x10^3/uL


(4.0-11.0) 





 


Red Blood Count


 


 


 2.48 x10^6/uL


(3.50-5.40) 





 


Hemoglobin


 


 


 6.9 g/dL


(12.0-15.5) 





 


Hematocrit


 


 


 21.2 %


(36.0-47.0) 





 


Mean Corpuscular Volume   86 fL ()  


 


Mean Corpuscular Hemoglobin   28 pg (25-35)  


 


Mean Corpuscular Hemoglobin


Concent 


 


 33 g/dL


(31-37) 





 


Red Cell Distribution Width


 


 


 15.9 %


(11.5-14.5) 





 


Platelet Count


 


 


 583 x10^3/uL


(140-400) 





 


Neutrophils (%) (Auto)   84 % (31-73)  


 


Lymphocytes (%) (Auto)   8 % (24-48)  


 


Monocytes (%) (Auto)   5 % (0-9)  


 


Eosinophils (%) (Auto)   3 % (0-3)  


 


Basophils (%) (Auto)   0 % (0-3)  


 


Neutrophils # (Auto)


 


 


 14.0 x10^3/uL


(1.8-7.7) 





 


Lymphocytes # (Auto)


 


 


 1.3 x10^3/uL


(1.0-4.8) 





 


Monocytes # (Auto)


 


 


 0.8 x10^3/uL


(0.0-1.1) 





 


Eosinophils # (Auto)


 


 


 0.6 x10^3/uL


(0.0-0.7) 





 


Basophils # (Auto)


 


 


 0.0 x10^3/uL


(0.0-0.2) 











Problem List


Problems


Medical Problems:


(1) Acute pancreatitis


Status: Acute  





(2) Cholelithiasis


Status: Acute  








Assessment/Plan


s/p necrosectomy


cont drains, pancreatic fluid should improve, given time


pt and wbc improved with current changes.





Justicifation of Admission Dx:


Justifications for Admission:


Justification of Admission Dx:  Yes











GAMAL ZHOU MD             Jul 22, 2020 12:44

## 2020-07-22 NOTE — NUR
This RN assumed care of pt at 1700. Report received from JAKUB Chau. Pt transferred from 
chair to bed and repositioned to R side. No complaints at this time. Will monitor pt.

## 2020-07-22 NOTE — PDOC
Infectious Disease Note


Subjective:


Subjective


Pt resting quietly, arousable


No fevers last 24 hours


TPN





Vital Signs:


Vital Signs





Vital Signs








  Date Time  Temp Pulse Resp B/P (MAP) Pulse Ox O2 Delivery O2 Flow Rate FiO2


 


7/22/20 04:00 98.1 123 29 109/66 (80) 94 Nasal Cannula 1.0 





 98.1       











Physical Exam:


PHYSICAL EXAM


GENERAL: laying in bed, resting quietly 


HEENT: Pupils equal, oral cavity dry. NGT out, 


NECK:  Tracheostomy 


LUNGS: Diminished aeration bases,  CT on left 


HEART:  S1, S2, regular, tachy 110s 


ABDOMEN: Distended, bowel sounds hypoactive, soft, richardson x 2, 3 ROBERT drains, G-J 

tube and + wound vac 


: [place] in place 


EXTREMITIES: Trace generalized edema, no cyanosis. MARTHA hose bilaterally, 


SKIN: warm touch. No signs of rash.  


NEURO: Arousable


LUE-PICC without signs of complications





Medications:


Inpatient Meds:





Current Medications








 Medications


  (Trade)  Dose


 Ordered  Sig/Yee  Start Time


 Stop Time Status Last Admin


Dose Admin


 


 Acetaminophen


  (Tylenol Supp)  650 mg  PRN Q6HRS  PRN  3/24/20 10:30


    7/21/20 13:03


650 MG


 


 Acetaminophen


  (Tylenol)  650 mg  PRN Q6HRS  PRN  3/21/20 03:36


 5/13/20 10:25 DC 4/16/20 19:56


650 MG


 


 Acetylcysteine


  (Mucomyst 20%


 Resp Treatment)  600 mg  RTBID  6/27/20 12:00


    7/21/20 19:41


600 MG


 


 Albumin Human  500 ml @ 


 125 mls/hr  PRN Q1HR  PRN  6/30/20 15:45


     





 


 Albuterol Sulfate


  (Ventolin Neb


 Soln)  2.5 mg  1X  ONCE  3/17/20 22:30


 3/17/20 22:31 DC 3/18/20 00:56


2.5 MG


 


 Albuterol/


 Ipratropium


  (Duoneb)  3 ml  Q4HRS  6/13/20 08:00


    7/22/20 03:44


3 ML


 


 Alprazolam


  (Xanax)  0.5 mg  PRN TID  PRN  7/17/20 08:00


    7/22/20 01:58


0.5 MG


 


 Alteplase,


 Recombinant


  (Cathflo For


 Central Catheter


 Clearance)  1 mg  1X  ONCE  7/21/20 12:00


 7/21/20 12:01 DC 7/21/20 12:17


1 MG


 


 Alteplase,


 Recombinant 4 mg/


 Sodium Chloride  20 ml @ 20


 mls/hr  1X  ONCE  6/17/20 10:00


 6/17/20 10:59 DC 6/17/20 10:09


20 MLS/HR


 


 Amino Acids/


 Glycerin/


 Electrolytes  1,000 ml @ 


 75 mls/hr  [id number]  4/20/20 21:15


   UNV  





 


 Artificial Tears


  (Artificial


 Tears)  1 drop  PRN Q15MIN  PRN  4/29/20 05:30


    6/23/20 21:17


1 DROP


 


 Atenolol


  (Tenormin)  100 mg  DAILY  3/17/20 09:00


 3/16/20 20:08 DC  





 


 Atropine Sulfate


  (ATROPINE 0.5mg


 SYRINGE)  0.5 mg  PRN Q5MIN  PRN  4/2/20 08:15


     





 


 Barium Sulfate


  (Varibar Thin


 Liquid Apple)  148 gm  1X  ONCE  5/26/20 11:45


 5/26/20 11:49 DC  





 


 Benzocaine


  (Hurricaine One)  1 spray  1X  ONCE  3/20/20 14:30


 3/20/20 14:31 DC 3/20/20 16:38


1 SPRAY


 


 Bisacodyl


  (Dulcolax Supp)  10 mg  STK-MED ONCE  4/27/20 10:59


 4/27/20 10:59 DC  





 


 Bumetanide


  (Bumex)  2 mg  DAILY  5/8/20 10:00


 5/18/20 17:15 DC 5/18/20 08:07


2 MG


 


 Bupivacaine HCl/


 Epinephrine Bitart


  (Sensorcain-Epi


 0.5%-1:916972 Mpf)  30 ml  STK-MED ONCE  6/30/20 08:34


 6/30/20 08:35 DC  





 


 Calcium Carbonate/


 Glycine


  (Tums)  500 mg  PRN [id number]  PRN  3/18/20 17:45


 5/13/20 10:25 DC  





 


 Calcium Chloride


 1000 mg/Sodium


 Chloride  110 ml @ 


 220 mls/hr  1X  ONCE  3/17/20 22:30


 3/17/20 22:59 DC 3/17/20 22:11


220 MLS/HR


 


 Calcium Chloride


 3000 mg/Sodium


 Chloride  1,030 ml @ 


 50 mls/hr  [id number]  3/19/20 08:00


 3/21/20 15:23 DC 3/21/20 02:17


50 MLS/HR


 


 Calcium Gluconate


  (Calcium


 Gluconate)  2,000 mg  1X  ONCE  3/19/20 02:15


 3/19/20 02:16 DC 3/19/20 02:19


2,000 MG


 


 Calcium Gluconate


 1000 mg/Sodium


 Chloride  110 ml @ 


 220 mls/hr  1X  ONCE  3/18/20 03:30


 3/18/20 03:59 DC 3/18/20 03:21


220 MLS/HR


 


 Calcium Gluconate


 2000 mg/Sodium


 Chloride  120 ml @ 


 220 mls/hr  1X  ONCE  3/18/20 07:30


 3/18/20 08:02 DC 3/18/20 09:05


220 MLS/HR


 


 Cefepime HCl


  (Maxipime)  2 gm  Q12HR  3/25/20 09:00


 4/8/20 09:58 DC 4/7/20 20:56


2 GM


 


 Ceftazidime/


 Avibactam 2.5 gm/


 Sodium Chloride  100 ml @ 


 50 mls/hr  Q8HRS  7/21/20 14:00


    7/22/20 05:32


50 MLS/HR


 


 Cellulose


  (Surgicel


 Fibrillar 1x2)  1 each  STK-MED ONCE  4/6/20 11:00


 4/6/20 11:01 DC  





 


 Cellulose


  (Surgicel


 Hemostat 2x14)  1 each  STK-MED ONCE  4/27/20 10:58


 4/27/20 10:59 DC  





 


 Cellulose


  (Surgicel


 Hemostat 4x8)  1 each  STK-MED ONCE  4/27/20 10:58


 4/27/20 10:59 DC  





 


 Chlorhexidine


 Gluconate


  (Peridex)  15 ml  BID  6/13/20 09:00


 6/13/20 07:58 DC  





 


 Ciprofloxacin/


 Dextrose  200 ml @ 


 200 mls/hr  Q12HR  7/12/20 10:00


 7/21/20 08:20 DC 7/20/20 21:02


200 MLS/HR


 


 Cyclobenzaprine


 HCl


  (Flexeril)  10 mg  PRN Q6HRS  PRN  4/30/20 10:45


    7/10/20 19:12


10 MG


 


 Daptomycin 410 mg/


 Sodium Chloride  50 ml @ 


 100 mls/hr  Q24H  6/7/20 14:00


 6/10/20 08:30 DC 6/9/20 13:33


100 MLS/HR


 


 Daptomycin 430 mg/


 Sodium Chloride  50 ml @ 


 100 mls/hr  Q24H  4/25/20 13:00


 4/30/20 20:58 DC 4/30/20 13:00


100 MLS/HR


 


 Daptomycin 450 mg/


 Sodium Chloride  50 ml @ 


 100 mls/hr  Q24H  5/17/20 09:00


 5/21/20 08:30 DC 5/20/20 09:25


100 MLS/HR


 


 Daptomycin 485 mg/


 Sodium Chloride  50 ml @ 


 100 mls/hr  Q24H  5/4/20 11:00


 5/12/20 07:44 DC 5/11/20 13:10


100 MLS/HR


 


 Daptomycin 500 mg/


 Sodium Chloride  50 ml @ 


 100 mls/hr  Q24H  7/15/20 09:00


    7/21/20 09:31


100 MLS/HR


 


 Desflurane


  (Suprane)  90 ml  STK-MED ONCE  6/30/20 10:18


 6/30/20 10:19 DC  





 


 Dexamethasone


 Sodium Phosphate


  (Decadron)  4 mg  STK-MED ONCE  4/27/20 10:56


 4/27/20 10:57 DC  





 


 Dexmedetomidine


 HCl 400 mcg/


 Sodium Chloride  100 ml @ 0


 mls/hr  CONT  PRN  4/2/20 08:15


 5/30/20 18:31 DC 5/30/20 12:57


8 MLS/HR


 


 Dextrose


  (Dextrose


 50%-Water Syringe)  12.5 gm  PRN Q15MIN  PRN  3/16/20 09:30


     





 


 Digoxin


  (Lanoxin)  125 mcg  1X  ONCE  3/19/20 18:00


 3/19/20 18:01 DC 3/19/20 17:10


125 MCG


 


 Diphenhydramine


 HCl


  (Benadryl)  25 mg  1X  ONCE  7/16/20 19:00


 7/16/20 19:01 DC 7/16/20 18:56


25 MG


 


 Duloxetine HCl


  (Cymbalta)  30 mg  DAILY  5/10/20 14:00


 5/13/20 10:25 DC 5/11/20 09:48


30 MG


 


 Enoxaparin Sodium


  (Lovenox 100mg


 Syringe)  100 mg  Q12HR  4/21/20 21:00


   UNV  





 


 Enoxaparin Sodium


  (Lovenox 40mg


 Syringe)  40 mg  Q24H  7/1/20 08:00


    7/21/20 09:30


40 MG


 


 Ephedrine Sulfate


  (ePHEDrine PF IN


 SALINE SYRINGE)  50 mg  STK-MED ONCE  6/30/20 14:45


 6/30/20 14:45 DC  





 


 Etomidate


  (Amidate)  8 mg  1X  ONCE  3/23/20 08:30


 3/23/20 08:31 DC 3/23/20 08:33


8 MG


 


 Fentanyl


  (Duragesic 12mcg/


 Hr Patch)  1 patch  Q3DAYS  7/10/20 09:00


    7/19/20 09:00


1 PATCH


 


 Fentanyl


  (Duragesic 50mcg/


 Hr Patch)  1 patch  Q72H  6/4/20 21:00


 6/13/20 12:00 DC 6/4/20 21:22


1 PATCH


 


 Fentanyl Citrate  30 ml @ 0


 mls/hr  CONT  PRN  7/9/20 17:30


     





 


 Fentanyl Citrate


  (Fentanyl 2ml


 Vial)  100 mcg  STK-MED ONCE  6/30/20 07:44


 6/30/20 07:44 DC  





 


 Fentanyl Citrate


  (Fentanyl 5ml


 Vial)  250 mcg  1X  ONCE  5/8/20 09:15


 5/8/20 09:16 DC 5/8/20 09:30


50 MCG


 


 Flumazenil


  (Romazicon)  0.5 mg  STK-MED ONCE  6/7/20 14:48


 6/7/20 14:48 DC  





 


 Fluoxetine HCl


  (PROzac)  20 mg  QHS  6/4/20 21:00


    7/21/20 21:04


20 MG


 


 Furosemide


  (Lasix)  40 mg  1X  ONCE  6/24/20 15:30


 6/24/20 15:33 DC 6/24/20 16:27


40 MG


 


 Haloperidol


 Lactate


  (Haldol Inj)  3 mg  1X  ONCE  5/4/20 14:30


 5/4/20 14:31 DC 5/4/20 14:37


3 MG


 


 Heparin Sodium


  (Porcine)


  (Hep Lock Adult)  500 unit  STK-MED ONCE  4/7/20 09:29


 4/7/20 09:30 DC  





 


 Heparin Sodium


  (Porcine)


  (Heparin Sodium)  5,000 unit  Q12HR  4/27/20 21:00


 5/7/20 09:59 DC 5/6/20 20:57


5,000 UNIT


 


 Heparin Sodium


  (Porcine) 1000


 unit/Sodium


 Chloride  1,001 ml @ 


 1,001 mls/hr  1X  ONCE  6/30/20 06:00


 6/30/20 06:59 DC  





 


 Hydromorphone HCl


  (Dilaudid


 Standard PCA)  12 mg  STK-MED ONCE  5/1/20 15:50


 5/12/20 11:24 DC  





 


 Hydromorphone HCl


  (Dilaudid)  1 mg  PRN Q4HRS  PRN  5/4/20 19:00


 5/18/20 17:10 DC 5/18/20 06:25


1 MG


 


 Info


  (CONTRAST GIVEN


 -- Rx MONITORING)  1 each  PRN DAILY  PRN  7/21/20 11:45


 7/23/20 11:44   





 


 Info


  (Icu Electrolyte


 Protocol)  1 ea  CONT PRN  PRN  3/29/20 13:15


     





 


 Info


  (PHARMACY


 MONITORING -- do


 not chart)  1 each  PRN DAILY  PRN  4/24/20 15:45


 5/26/20 14:14 DC  





 


 Info


  (Tpn Per


 Pharmacy)  1 each  PRN DAILY  PRN  3/18/20 12:30


   UNV  





 


 Insulin Human


 Lispro


  (HumaLOG)  0-9 UNITS  Q6HRS  3/16/20 09:30


    7/20/20 14:42


4 UNITS


 


 Insulin Human


 Regular


  (HumuLIN R VIAL)  5 unit  1X  ONCE  3/17/20 22:30


 3/17/20 22:31 DC 3/17/20 22:14


5 UNIT


 


 Iohexol


  (Omnipaque 240


 Mg/ml)  30 ml  1X  ONCE  3/30/20 11:30


 3/30/20 11:33 DC 3/30/20 11:30


30 ML


 


 Iohexol


  (Omnipaque 300


 Mg/ml)  75 ml  1X  ONCE  7/21/20 11:30


 7/21/20 11:31 DC 7/21/20 11:30


75 ML


 


 Iohexol


  (Omnipaque 350


 Mg/ml)  90 ml  1X  ONCE  3/16/20 03:30


 3/16/20 03:31 DC 3/16/20 03:25


90 ML


 


 Ketorolac


 Tromethamine


  (Toradol 30mg


 Vial)  30 mg  1X  ONCE  3/16/20 03:00


 3/16/20 03:01 DC 3/16/20 02:54


30 MG


 


 Lidocaine HCl


  (Buffered


 Lidocaine 1%)  3 ml  1X  ONCE  6/15/20 13:00


 6/15/20 13:01 DC 6/15/20 13:11


12 ML


 


 Lidocaine HCl


  (Glydo


  (Lidocaine) Jelly)  1 thomas  1X  ONCE  3/20/20 14:30


 3/20/20 14:31 DC 3/20/20 16:38


1 THOMAS


 


 Lidocaine HCl


  (Lidocaine 1%


 20ml Vial)  20 ml  1X  ONCE  6/7/20 15:00


 6/7/20 15:01 DC 6/7/20 15:30


20 ML


 


 Lidocaine HCl


  (Lidocaine Pf 2%


 Vial)  5 ml  STK-MED ONCE  6/30/20 07:44


 6/30/20 07:44 DC  





 


 Lidocaine HCl


  (Xylocaine-Mpf


 1% 2ml Vial)  2 ml  PRN 1X  PRN  4/27/20 07:00


 4/28/20 06:59 DC  





 


 Linezolid/Dextrose  300 ml @ 


 300 mls/hr  Q12HR  5/17/20 09:00


 5/20/20 08:11 DC 5/19/20 21:08


300 MLS/HR


 


 Lorazepam


  (Ativan Inj)  0.25 mg  PRN Q4HRS  PRN  6/3/20 07:30


    7/20/20 03:28


0.25 MG


 


 Magnesium Sulfate  50 ml @ 25


 mls/hr  1X  ONCE  6/30/20 16:30


 6/30/20 18:29 DC 6/30/20 17:02


25 MLS/HR


 


 Meropenem 1 gm/


 Sodium Chloride  100 ml @ 


 200 mls/hr  Q12HR  6/7/20 21:00


 6/25/20 08:56 DC 6/25/20 08:27


200 MLS/HR


 


 Meropenem 500 mg/


 Sodium Chloride  50 ml @ 


 100 mls/hr  Q6HRS  6/28/20 18:00


 7/21/20 08:23 DC 7/21/20 06:15


100 MLS/HR


 


 Methylprednisolone


 Sodium Succinate


  (SOLU-Medrol


 125MG VIAL)  125 mg  1X  ONCE  6/13/20 06:15


 6/13/20 06:16 DC 6/13/20 06:26


125 MG


 


 Metoclopramide HCl


  (Reglan Vial)  10 mg  PRN Q3HRS  PRN  5/9/20 16:45


    5/14/20 04:25


10 MG


 


 Metoprolol


 Tartrate


  (Lopressor Vial)  5 mg  PRN Q6HRS  PRN  6/10/20 09:00


    7/18/20 17:50


5 MG


 


 Metronidazole  100 ml @ 


 100 mls/hr  Q8HRS  4/14/20 10:00


 4/21/20 08:10 DC 4/21/20 06:04


100 MLS/HR


 


 Micafungin Sodium


 100 mg/Dextrose  100 ml @ 


 100 mls/hr  Q24H  6/30/20 08:30


    7/21/20 09:31


100 MLS/HR


 


 Midazolam HCl


  (Versed)  2 mg  1X  ONCE  6/7/20 15:00


 6/7/20 15:01 DC 6/7/20 15:28


1 MG


 


 Midazolam HCl 100


 mg/Sodium Chloride  100 ml @ 1


 mls/hr  CONT  PRN  6/30/20 14:45


    7/3/20 18:48


10 MLS/HR


 


 Midazolam HCl 50


 mg/Sodium Chloride  50 ml @ 0


 mls/hr  CONT  PRN  3/23/20 08:15


 3/28/20 15:59 DC 3/26/20 22:39


7 MLS/HR


 


 Morphine Sulfate


  (Morphine


 Sulfate)  1 mg  PRN Q1HR  PRN  6/30/20 14:45


     





 


 Multi-Ingred


 Cream/Lotion/Oil/


 Oint


  (Artificial


 Tears Eye


 Ointment)  1 thomas  PRN Q1HR  PRN  3/25/20 17:30


 6/3/20 14:39 DC 4/13/20 08:19


1 THOMAS


 


 Naloxone HCl


  (Narcan)  0.4 mg  PRN Q2MIN  PRN  6/30/20 14:45


     





 


 Norepinephrine


 Bitartrate 8 mg/


 Dextrose  258 ml @ 


 13.332 mls/


 hr  CONT  PRN  6/7/20 06:30


    7/2/20 09:09


1.6 MLS/HR


 


 Ondansetron HCl


  (Zofran)  4 mg  STK-MED ONCE  6/30/20 13:33


 6/30/20 13:33 DC  





 


 Pantoprazole


 Sodium


  (PROTONIX VIAL


 for IV PUSH)  40 mg  DAILYAC  3/16/20 11:30


    7/21/20 09:30


40 MG


 


 Phenylephrine HCl


  (Ken-Synephrine


 Inj)  10 mg  STK-MED ONCE  6/30/20 13:33


 6/30/20 13:33 DC  





 


 Phenylephrine HCl


  (PHENYLEPHRINE


 in 0.9% NACL PF)  1 mg  STK-MED ONCE  6/30/20 14:44


 6/30/20 14:45 DC  





 


 Piperacillin Sod/


 Tazobactam Sod


 3.375 gm/Sodium


 Chloride  50 ml @ 


 100 mls/hr  Q6HRS  5/27/20 12:00


 6/4/20 07:26 DC 6/4/20 06:10


100 MLS/HR


 


 Piperacillin Sod/


 Tazobactam Sod


 4.5 gm/Sodium


 Chloride  100 ml @ 


 200 mls/hr  1X  ONCE  3/16/20 06:00


 3/16/20 06:29 DC 3/16/20 05:44


200 MLS/HR


 


 Potassium


 Chloride 110 meq/


 Magnesium Sulfate


 20 meq/


 Multivitamins 10


 ml/Chromium/


 Copper/Manganese/


 Seleni/Zn 1 ml/


 Insulin Human


 Regular 15 unit/


 Total Parenteral


 Nutrition/Amino


 Acids/Dextrose/


 Fat Emulsion


 Intravenous  1,800 ml @ 


 75 mls/hr  TPN  CONT  5/24/20 22:00


 5/25/20 21:59 DC 5/24/20 22:48


75 MLS/HR


 


 Potassium


 Chloride 15 meq/


 Bicarbonate


 Dialysis Soln w/


 out KCl  5,007.5 ml


  @ 1,000 mls/


 hr  Q5H1M  3/29/20 20:00


 4/2/20 13:08 DC 4/1/20 18:14


1,000 MLS/HR


 


 Potassium


 Chloride 20 meq/


 Bicarbonate


 Dialysis Soln w/


 out KCl  5,010 ml @ 


 1,000 mls/hr  Q5H1M  3/25/20 16:00


 3/29/20 19:59 DC 3/29/20 14:54


1,000 MLS/HR


 


 Potassium


 Chloride 40 meq/


 Potassium Acetate


 60 meq/Magnesium


 Sulfate 10 meq/


 Multivitamins 10


 ml/Chromium/


 Copper/Manganese/


 Seleni/Zn 1 ml/


 Insulin Human


 Regular 20 unit/


 Total Parenteral


 Nutrition/Amino


 Acids/Dextrose/


 Fat Emulsion


 Intravenous  1,800 ml @ 


 75 mls/hr  TPN  CONT  6/4/20 22:00


 6/5/20 21:59 DC 6/5/20 00:03


75 MLS/HR


 


 Potassium


 Chloride 70 meq/


 Magnesium Sulfate


 20 meq/


 Multivitamins 10


 ml/Chromium/


 Copper/Manganese/


 Seleni/Zn 1 ml/


 Insulin Human


 Regular 15 unit/


 Total Parenteral


 Nutrition/Amino


 Acids/Dextrose/


 Fat Emulsion


 Intravenous  1,800 ml @ 


 75 mls/hr  TPN  CONT  5/29/20 22:00


 5/30/20 21:59 DC 5/29/20 23:13


75 MLS/HR


 


 Potassium


 Chloride 75 meq/


 Magnesium Sulfate


 15 meq/


 Multivitamins 10


 ml/Chromium/


 Copper/Manganese/


 Seleni/Zn 0.5 ml/


 Insulin Human


 Regular 15 unit/


 Total Parenteral


 Nutrition/Amino


 Acids/Dextrose/


 Fat Emulsion


 Intravenous  1,920 ml @ 


 80 mls/hr  TPN  CONT  5/9/20 22:00


 5/10/20 21:59 DC 5/9/20 22:41


80 MLS/HR


 


 Potassium


 Chloride 75 meq/


 Magnesium Sulfate


 15 meq/Calcium


 Gluconate 8 meq/


 Multivitamins 10


 ml/Chromium/


 Copper/Manganese/


 Seleni/Zn 0.5 ml/


 Insulin Human


 Regular 15 unit/


 Total Parenteral


 Nutrition/Amino


 Acids/Dextrose/


 Fat Emulsion


 Intravenous  1,920 ml @ 


 80 mls/hr  TPN  CONT  5/7/20 22:00


 5/8/20 21:59 DC 5/7/20 22:28


80 MLS/HR


 


 Potassium


 Chloride 75 meq/


 Magnesium Sulfate


 15 meq/Calcium


 Gluconate 8 meq/


 Multivitamins 10


 ml/Chromium/


 Copper/Manganese/


 Seleni/Zn 0.5 ml/


 Insulin Human


 Regular 20 unit/


 Total Parenteral


 Nutrition/Amino


 Acids/Dextrose/


 Fat Emulsion


 Intravenous  1,920 ml @ 


 80 mls/hr  TPN  CONT  5/6/20 22:00


 5/7/20 21:59 DC 5/6/20 22:00


80 MLS/HR


 


 Potassium


 Chloride 75 meq/


 Magnesium Sulfate


 15 meq/Calcium


 Gluconate 8 meq/


 Multivitamins 10


 ml/Chromium/


 Copper/Manganese/


 Seleni/Zn 0.5 ml/


 Insulin Human


 Regular 25 unit/


 Total Parenteral


 Nutrition/Amino


 Acids/Dextrose/


 Fat Emulsion


 Intravenous  1,920 ml @ 


 80 mls/hr  TPN  CONT  5/4/20 22:00


 5/5/20 21:59 DC 5/4/20 23:08


80 MLS/HR


 


 Potassium


 Chloride 75 meq/


 Magnesium Sulfate


 20 meq/Calcium


 Gluconate 10 meq/


 Multivitamins 10


 ml/Chromium/


 Copper/Manganese/


 Seleni/Zn 0.5 ml/


 Insulin Human


 Regular 25 unit/


 Total Parenteral


 Nutrition/Amino


 Acids/Dextrose/


 Fat Emulsion


 Intravenous  1,920 ml @ 


 80 mls/hr  TPN  CONT  5/3/20 22:00


 5/4/20 21:59 DC 5/3/20 22:04


80 MLS/HR


 


 Potassium


 Chloride 75 meq/


 Magnesium Sulfate


 20 meq/Calcium


 Gluconate 10 meq/


 Multivitamins 10


 ml/Chromium/


 Copper/Manganese/


 Seleni/Zn 0.5 ml/


 Insulin Human


 Regular 30 unit/


 Total Parenteral


 Nutrition/Amino


 Acids/Dextrose/


 Fat Emulsion


 Intravenous  1,920 ml @ 


 80 mls/hr  TPN  CONT  5/2/20 22:00


 5/3/20 22:00 DC 5/2/20 21:51


80 MLS/HR


 


 Potassium


 Chloride 80 meq/


 Magnesium Sulfate


 20 meq/


 Multivitamins 10


 ml/Chromium/


 Copper/Manganese/


 Seleni/Zn 0.5 ml/


 Insulin Human


 Regular 15 unit/


 Total Parenteral


 Nutrition/Amino


 Acids/Dextrose/


 Fat Emulsion


 Intravenous  1,920 ml @ 


 80 mls/hr  TPN  CONT  5/12/20 22:00


 5/13/20 21:59 DC 5/12/20 21:40


80 MLS/HR


 


 Potassium


 Chloride 80 meq/


 Magnesium Sulfate


 20 meq/


 Multivitamins 10


 ml/Chromium/


 Copper/Manganese/


 Seleni/Zn 1 ml/


 Insulin Human


 Regular 15 unit/


 Total Parenteral


 Nutrition/Amino


 Acids/Dextrose/


 Fat Emulsion


 Intravenous  1,800 ml @ 


 75 mls/hr  TPN  CONT  5/31/20 22:00


 6/1/20 21:59 DC 5/31/20 21:54


75 MLS/HR


 


 Potassium


 Chloride 90 meq/


 Magnesium Sulfate


 20 meq/


 Multivitamins 10


 ml/Chromium/


 Copper/Manganese/


 Seleni/Zn 1 ml/


 Insulin Human


 Regular 15 unit/


 Total Parenteral


 Nutrition/Amino


 Acids/Dextrose/


 Fat Emulsion


 Intravenous  1,800 ml @ 


 75 mls/hr  TPN  CONT  5/20/20 22:00


 5/21/20 21:59 DC 5/20/20 22:28


75 MLS/HR


 


 Potassium


 Chloride 90 meq/


 Magnesium Sulfate


 20 meq/


 Multivitamins 10


 ml/Chromium/


 Copper/Manganese/


 Seleni/Zn 1 ml/


 Insulin Human


 Regular 20 unit/


 Total Parenteral


 Nutrition/Amino


 Acids/Dextrose/


 Fat Emulsion


 Intravenous  1,800 ml @ 


 75 mls/hr  TPN  CONT  6/3/20 22:00


 6/4/20 21:59 DC 6/3/20 23:13


75 MLS/HR


 


 Potassium


 Chloride/Water  100 ml @ 


 100 mls/hr  Q1H  6/17/20 08:00


 6/17/20 09:59 DC 6/17/20 09:12


100 MLS/HR


 


 Potassium


 Phosphate 20 mmol/


 Sodium Chloride  106.6667


 ml @ 


 51.667 m...  1X  ONCE  3/25/20 13:00


 3/25/20 15:03 DC 3/25/20 12:51


51.667 MLS/HR


 


 Potassium Acetate


 30 meq/Magnesium


 Sulfate 14 meq/


 Multivitamins 10


 ml/Chromium/


 Copper/Manganese/


 Seleni/Zn 1 ml/


 Insulin Human


 Regular 15 unit/


 Sodium Chloride


 20 meq/Potassium


 Chloride 30 meq/


 Total Parenteral


 Nutrition/Amino


 Acids/Dextrose/


 Fat Emulsion


 Intravenous  1,920 ml @ 


 80 mls/hr  TPN  CONT  6/23/20 22:00


 6/24/20 21:59 DC 6/23/20 21:46


80 MLS/HR


 


 Potassium Acetate


 30 meq/Magnesium


 Sulfate 20 meq/


 Calcium Gluconate


 10 meq/


 Multivitamins 10


 ml/Chromium/


 Copper/Manganese/


 Seleni/Zn 0.5 ml/


 Insulin Human


 Regular 30 unit/


 Potassium


 Chloride 30 meq/


 Total Parenteral


 Nutrition/Amino


 Acids/Dextrose/


 Fat Emulsion


 Intravenous  1,920 ml @ 


 80 mls/hr  TPN  CONT  5/1/20 22:00


 5/2/20 21:59 DC 5/1/20 22:34


80 MLS/HR


 


 Potassium Acetate


 40 meq/Magnesium


 Sulfate 10 meq/


 Multivitamins 10


 ml/Chromium/


 Copper/Manganese/


 Seleni/Zn 1 ml/


 Insulin Human


 Regular 20 unit/


 Total Parenteral


 Nutrition/Amino


 Acids/Dextrose/


 Fat Emulsion


 Intravenous  1,920 ml @ 


 80 mls/hr  TPN  CONT  6/16/20 22:00


 6/17/20 21:59 DC 6/16/20 21:32


80 MLS/HR


 


 Potassium Acetate


 40 meq/Magnesium


 Sulfate 5 meq/


 Multivitamins 10


 ml/Chromium/


 Copper/Manganese/


 Seleni/Zn 1 ml/


 Insulin Human


 Regular 30 unit/


 Total Parenteral


 Nutrition/Amino


 Acids/Dextrose/


 Fat Emulsion


 Intravenous  1,920 ml @ 


 80 mls/hr  TPN  CONT  6/15/20 22:00


 6/16/20 19:34 DC 6/15/20 21:54


80 MLS/HR


 


 Potassium Acetate


 55 meq/Magnesium


 Sulfate 20 meq/


 Calcium Gluconate


 10 meq/


 Multivitamins 10


 ml/Chromium/


 Copper/Manganese/


 Seleni/Zn 0.5 ml/


 Insulin Human


 Regular 30 unit/


 Total Parenteral


 Nutrition/Amino


 Acids/Dextrose/


 Fat Emulsion


 Intravenous  1,920 ml @ 


 80 mls/hr  TPN  CONT  4/30/20 22:00


 5/1/20 21:59 DC 5/1/20 01:00


80 MLS/HR


 


 Potassium Acetate


 55 meq/Magnesium


 Sulfate 20 meq/


 Calcium Gluconate


 10 meq/


 Multivitamins 10


 ml/Chromium/


 Copper/Manganese/


 Seleni/Zn 0.5 ml/


 Insulin Human


 Regular 35 unit/


 Total Parenteral


 Nutrition/Amino


 Acids/Dextrose/


 Fat Emulsion


 Intravenous  1,920 ml @ 


 80 mls/hr  TPN  CONT  4/28/20 22:00


 4/29/20 21:59 DC 4/28/20 22:02


80 MLS/HR


 


 Potassium Acetate


 60 meq/Magnesium


 Sulfate 10 meq/


 Multivitamins 10


 ml/Chromium/


 Copper/Manganese/


 Seleni/Zn 1 ml/


 Insulin Human


 Regular 20 unit/


 Total Parenteral


 Nutrition/Amino


 Acids/Dextrose/


 Fat Emulsion


 Intravenous  1,920 ml @ 


 80 mls/hr  TPN  CONT  6/17/20 22:00


 6/18/20 21:59 DC 6/17/20 21:55


80 MLS/HR


 


 Potassium Acetate


 60 meq/Magnesium


 Sulfate 14 meq/


 Multivitamins 10


 ml/Chromium/


 Copper/Manganese/


 Seleni/Zn 1 ml/


 Insulin Human


 Regular 15 unit/


 Sodium Chloride


 20 meq/Total


 Parenteral


 Nutrition/Amino


 Acids/Dextrose/


 Fat Emulsion


 Intravenous  1,920 ml @ 


 80 mls/hr  TPN  CONT  6/22/20 22:00


 6/23/20 21:59 DC 6/22/20 21:54


80 MLS/HR


 


 Potassium Acetate


 60 meq/Magnesium


 Sulfate 14 meq/


 Multivitamins 10


 ml/Chromium/


 Copper/Manganese/


 Seleni/Zn 1 ml/


 Insulin Human


 Regular 15 unit/


 Total Parenteral


 Nutrition/Amino


 Acids/Dextrose/


 Fat Emulsion


 Intravenous  1,920 ml @ 


 80 mls/hr  TPN  CONT  6/21/20 22:00


 6/22/20 21:59 DC 6/21/20 22:22


80 MLS/HR


 


 Potassium Acetate


 60 meq/Magnesium


 Sulfate 14 meq/


 Multivitamins 10


 ml/Chromium/


 Copper/Manganese/


 Seleni/Zn 1 ml/


 Insulin Human


 Regular 20 unit/


 Total Parenteral


 Nutrition/Amino


 Acids/Dextrose/


 Fat Emulsion


 Intravenous  1,920 ml @ 


 80 mls/hr  TPN  CONT  6/18/20 22:00


 6/19/20 21:59 DC 6/18/20 22:26


80 MLS/HR


 


 Potassium Acetate


 60 meq/Magnesium


 Sulfate 5 meq/


 Multivitamins 10


 ml/Chromium/


 Copper/Manganese/


 Seleni/Zn 1 ml/


 Insulin Human


 Regular 30 unit/


 Total Parenteral


 Nutrition/Amino


 Acids/Dextrose/


 Fat Emulsion


 Intravenous  1,920 ml @ 


 80 mls/hr  TPN  CONT  6/6/20 22:00


 6/7/20 21:59 DC 6/6/20 21:54


80 MLS/HR


 


 Potassium Acetate


 65 meq/Magnesium


 Sulfate 20 meq/


 Calcium Gluconate


 10 meq/


 Multivitamins 10


 ml/Chromium/


 Copper/Manganese/


 Seleni/Zn 0.5 ml/


 Insulin Human


 Regular 30 unit/


 Total Parenteral


 Nutrition/Amino


 Acids/Dextrose/


 Fat Emulsion


 Intravenous  1,920 ml @ 


 80 mls/hr  TPN  CONT  4/29/20 22:00


 4/30/20 21:59 DC 4/29/20 22:22


80 MLS/HR


 


 Potassium Acetate


 80 meq/Magnesium


 Sulfate 5 meq/


 Multivitamins 10


 ml/Chromium/


 Copper/Manganese/


 Seleni/Zn 1 ml/


 Insulin Human


 Regular 20 unit/


 Total Parenteral


 Nutrition/Amino


 Acids/Dextrose/


 Fat Emulsion


 Intravenous  1,920 ml @ 


 80 mls/hr  TPN  CONT  6/5/20 22:00


 6/6/20 21:59 DC 6/5/20 21:59


80 MLS/HR


 


 Prochlorperazine


 Edisylate


  (Compazine)  5 mg  PACU PRN  PRN  4/27/20 07:00


 4/28/20 06:59 DC  





 


 Propofol


  (Diprivan)  200 mg  STK-MED ONCE  6/30/20 07:44


 6/30/20 07:44 DC  





 


 Ringer's Solution  1,000 ml @ 


 30 mls/hr  Q24H  4/27/20 07:00


 4/27/20 18:59 DC  





 


 Rocuronium Bromide


  (Zemuron)  100 mg  STK-MED ONCE  6/30/20 07:44


 6/30/20 07:44 DC  





 


 Saliva Substitute


  (Biotene


 Moisturizing


 Mouth)  2 spray  PRN Q15MIN  PRN  5/21/20 11:00


     





 


 Sevoflurane


  (Ultane)  60 ml  STK-MED ONCE  4/27/20 12:26


 4/27/20 12:27 DC  





 


 Sodium


 Bicarbonate 150


 meq/Dextrose  1,150 ml @ 


 75 mls/hr  1X  ONCE  6/30/20 16:30


 7/1/20 07:49 DC 6/30/20 20:02


75 MLS/HR


 


 Sodium


 Bicarbonate 50


 meq/Sodium


 Chloride  1,050 ml @ 


 75 mls/hr  Q14H  3/18/20 07:30


 3/23/20 10:28 DC 3/22/20 21:10


75 MLS/HR


 


 Sodium Acetate 50


 meq/Potassium


 Acetate 55 meq/


 Magnesium Sulfate


 20 meq/Calcium


 Gluconate 10 meq/


 Multivitamins 10


 ml/Chromium/


 Copper/Manganese/


 Seleni/Zn 0.5 ml/


 Insulin Human


 Regular 35 unit/


 Total Parenteral


 Nutrition/Amino


 Acids/Dextrose/


 Fat Emulsion


 Intravenous  1,800 ml @ 


 75 mls/hr  TPN  CONT  4/25/20 22:00


 4/26/20 21:59 DC 4/25/20 22:03


75 MLS/HR


 


 Sodium Bicarbonate


  (Sodium Bicarb


 Adult 8.4% Syr)  100 meq  1X  ONCE  6/30/20 16:30


 6/30/20 16:31 DC 6/30/20 17:07


100 MEQ


 


 Sodium Chloride


  (Normal Saline


 Flush)  3 ml  QSHIFT  PRN  6/30/20 14:45


     





 


 Sodium Chloride


 80 meq/Potassium


 Chloride 30 meq/


 Potassium Acetate


 30 meq/Magnesium


 Sulfate 14 meq/


 Multivitamins 10


 ml/Chromium/


 Copper/Manganese/


 Seleni/Zn 1 ml/


 Insulin Human


 Regular 15 unit/


 Total Parenteral


 Nutrition/Amino


 Acids/Dextrose/


 Fat Emulsion


 Intravenous  1,920 ml @ 


 80 mls/hr  TPN  CONT  7/1/20 22:00


 7/2/20 21:59 DC 7/1/20 23:05


80 MLS/HR


 


 Sodium Chloride


 90 meq/Calcium


 Gluconate 10 meq/


 Multivitamins 10


 ml/Chromium/


 Copper/Manganese/


 Seleni/Zn 0.5 ml/


 Total Parenteral


 Nutrition/Amino


 Acids/Dextrose/


 Fat Emulsion


 Intravenous  1,512 ml @ 


 63 mls/hr  TPN  CONT  3/18/20 22:00


 3/19/20 21:59 DC 3/18/20 22:06


63 MLS/HR


 


 Sodium Chloride


 90 meq/Calcium


 Gluconate 10 meq/


 Multivitamins 10


 ml/Chromium/


 Copper/Manganese/


 Seleni/Zn 1 ml/


 Total Parenteral


 Nutrition/Amino


 Acids/Dextrose/


 Fat Emulsion


 Intravenous  55.005 ml


  @ 2.292


 mls/hr  TPN  CONT  3/18/20 22:00


 3/18/20 12:33 DC  





 


 Sodium Chloride


 90 meq/Magnesium


 Sulfate 10 meq/


 Calcium Gluconate


 20 meq/


 Multivitamins 10


 ml/Chromium/


 Copper/Manganese/


 Seleni/Zn 0.5 ml/


 Total Parenteral


 Nutrition/Amino


 Acids/Dextrose/


 Fat Emulsion


 Intravenous  1,512 ml @ 


 63 mls/hr  TPN  CONT  3/19/20 22:00


 3/20/20 21:59 DC 3/19/20 22:25


63 MLS/HR


 


 Sodium Chloride


 90 meq/Magnesium


 Sulfate 12 meq/


 Calcium Gluconate


 15 meq/


 Multivitamins 10


 ml/Chromium/


 Copper/Manganese/


 Seleni/Zn 0.5 ml/


 Insulin Human


 Regular 25 unit/


 Total Parenteral


 Nutrition/Amino


 Acids/Dextrose/


 Fat Emulsion


 Intravenous  1,400 ml @ 


 58.333 mls/


 hr  TPN  CONT  4/8/20 22:00


 4/9/20 21:59 DC 4/8/20 21:41


58.333 MLS/HR


 


 Sodium Chloride


 90 meq/Potassium


 Chloride 15 meq/


 Magnesium Sulfate


 12 meq/Calcium


 Gluconate 15 meq/


 Multivitamins 10


 ml/Chromium/


 Copper/Manganese/


 Seleni/Zn 0.5 ml/


 Insulin Human


 Regular 25 unit/


 Total Parenteral


 Nutrition/Amino


 Acids/Dextrose/


 Fat Emulsion


 Intravenous  1,400 ml @ 


 58.333 mls/


 hr  TPN  CONT  4/7/20 22:00


 4/8/20 21:59 DC 4/7/20 22:13


58.333 MLS/HR


 


 Sodium Chloride


 90 meq/Potassium


 Chloride 15 meq/


 Potassium


 Phosphate 10 mmol/


 Magnesium Sulfate


 8 meq/Calcium


 Gluconate 15 meq/


 Multivitamins 10


 ml/Chromium/


 Copper/Manganese/


 Seleni/Zn 0.5 ml/


 Insulin Human


 Regular 25 unit/


 Total Parenteral


 Nutrition/Amino


 Acids/Dextrose/


 Fat Emulsion


 Intravenous  1,400 ml @ 


 58.333 mls/


 hr  TPN  CONT  4/5/20 22:00


 4/6/20 21:59 DC 4/5/20 21:20


58.333 MLS/HR


 


 Sodium Chloride


 90 meq/Potassium


 Chloride 15 meq/


 Potassium


 Phosphate 10 mmol/


 Magnesium Sulfate


 10 meq/Calcium


 Gluconate 20 meq/


 Multivitamins 10


 ml/Chromium/


 Copper/Manganese/


 Seleni/Zn 0.5 ml/


 Total Parenteral


 Nutrition/Amino


 Acids/Dextrose/


 Fat Emulsion


 Intravenous  1,400 ml @ 


 58.333 mls/


 hr  TPN  CONT  3/23/20 22:00


 3/24/20 21:59 DC 3/23/20 21:42


58.333 MLS/HR


 


 Sodium Chloride


 90 meq/Potassium


 Chloride 15 meq/


 Potassium


 Phosphate 10 mmol/


 Magnesium Sulfate


 12 meq/Calcium


 Gluconate 15 meq/


 Multivitamins 10


 ml/Chromium/


 Copper/Manganese/


 Seleni/Zn 0.5 ml/


 Insulin Human


 Regular 25 unit/


 Total Parenteral


 Nutrition/Amino


 Acids/Dextrose/


 Fat Emulsion


 Intravenous  1,400 ml @ 


 58.333 mls/


 hr  TPN  CONT  4/6/20 22:00


 4/7/20 21:59 DC 4/6/20 22:24


58.333 MLS/HR


 


 Sodium Chloride


 90 meq/Potassium


 Chloride 15 meq/


 Potassium


 Phosphate 15 mmol/


 Magnesium Sulfate


 10 meq/Calcium


 Gluconate 15 meq/


 Multivitamins 10


 ml/Chromium/


 Copper/Manganese/


 Seleni/Zn 0.5 ml/


 Total Parenteral


 Nutrition/Amino


 Acids/Dextrose/


 Fat Emulsion


 Intravenous  1,400 ml @ 


 58.333 mls/


 hr  TPN  CONT  3/24/20 22:00


 3/25/20 21:59 DC 3/24/20 22:17


58.333 MLS/HR


 


 Sodium Chloride


 90 meq/Potassium


 Chloride 15 meq/


 Potassium


 Phosphate 15 mmol/


 Magnesium Sulfate


 10 meq/Calcium


 Gluconate 20 meq/


 Multivitamins 10


 ml/Chromium/


 Copper/Manganese/


 Seleni/Zn 0.5 ml/


 Total Parenteral


 Nutrition/Amino


 Acids/Dextrose/


 Fat Emulsion


 Intravenous  1,200 ml @ 


 50 mls/hr  TPN  CONT  3/22/20 22:00


 3/22/20 14:17 DC  





 


 Sodium Chloride


 90 meq/Potassium


 Chloride 15 meq/


 Potassium


 Phosphate 18 mmol/


 Magnesium Sulfate


 8 meq/Calcium


 Gluconate 15 meq/


 Multivitamins 10


 ml/Chromium/


 Copper/Manganese/


 Seleni/Zn 0.5 ml/


 Insulin Human


 Regular 10 unit/


 Total Parenteral


 Nutrition/Amino


 Acids/Dextrose/


 Fat Emulsion


 Intravenous  1,400 ml @ 


 58.333 mls/


 hr  TPN  CONT  3/27/20 22:00


 3/28/20 21:59 DC 3/27/20 21:43


58.333 MLS/HR


 


 Sodium Chloride


 90 meq/Potassium


 Chloride 15 meq/


 Potassium


 Phosphate 18 mmol/


 Magnesium Sulfate


 8 meq/Calcium


 Gluconate 15 meq/


 Multivitamins 10


 ml/Chromium/


 Copper/Manganese/


 Seleni/Zn 0.5 ml/


 Insulin Human


 Regular 15 unit/


 Total Parenteral


 Nutrition/Amino


 Acids/Dextrose/


 Fat Emulsion


 Intravenous  1,400 ml @ 


 58.333 mls/


 hr  TPN  CONT  3/30/20 22:00


 3/31/20 21:59 DC 3/30/20 21:47


58.333 MLS/HR


 


 Sodium Chloride


 90 meq/Potassium


 Chloride 15 meq/


 Potassium


 Phosphate 18 mmol/


 Magnesium Sulfate


 8 meq/Calcium


 Gluconate 15 meq/


 Multivitamins 10


 ml/Chromium/


 Copper/Manganese/


 Seleni/Zn 0.5 ml/


 Insulin Human


 Regular 20 unit/


 Total Parenteral


 Nutrition/Amino


 Acids/Dextrose/


 Fat Emulsion


 Intravenous  1,400 ml @ 


 58.333 mls/


 hr  TPN  CONT  4/2/20 22:00


 4/3/20 21:59 DC 4/2/20 22:45


58.333 MLS/HR


 


 Sodium Chloride


 90 meq/Potassium


 Chloride 15 meq/


 Potassium


 Phosphate 18 mmol/


 Magnesium Sulfate


 8 meq/Calcium


 Gluconate 15 meq/


 Multivitamins 10


 ml/Chromium/


 Copper/Manganese/


 Seleni/Zn 0.5 ml/


 Total Parenteral


 Nutrition/Amino


 Acids/Dextrose/


 Fat Emulsion


 Intravenous  1,400 ml @ 


 58.333 mls/


 hr  TPN  CONT  3/26/20 22:00


 3/27/20 21:59 DC 3/26/20 22:00


58.333 MLS/HR


 


 Sodium Chloride


 90 meq/Potassium


 Chloride 30 meq/


 Potassium Acetate


 30 meq/Magnesium


 Sulfate 15 meq/


 Multivitamins 10


 ml/Chromium/


 Copper/Manganese/


 Seleni/Zn 1 ml/


 Insulin Human


 Regular 15 unit/


 Total Parenteral


 Nutrition/Amino


 Acids/Dextrose/


 Fat Emulsion


 Intravenous  1,680 ml @ 


 70 mls/hr  TPN  CONT  7/16/20 22:00


 7/17/20 21:59 DC 7/16/20 22:06


70 MLS/HR


 


 Sodium Chloride


 90 meq/Potassium


 Phosphate 15 mmol/


 Magnesium Sulfate


 12 meq/Calcium


 Gluconate 15 meq/


 Multivitamins 10


 ml/Chromium/


 Copper/Manganese/


 Seleni/Zn 0.5 ml/


 Insulin Human


 Regular 30 unit/


 Total Parenteral


 Nutrition/Amino


 Acids/Dextrose/


 Fat Emulsion


 Intravenous  1,400 ml @ 


 58.333 mls/


 hr  TPN  CONT  4/10/20 22:00


 4/11/20 21:59 DC 4/10/20 21:49


58.333 MLS/HR


 


 Sodium Chloride


 90 meq/Potassium


 Phosphate 15 mmol/


 Magnesium Sulfate


 12 meq/Calcium


 Gluconate 15 meq/


 Multivitamins 10


 ml/Chromium/


 Copper/Manganese/


 Seleni/Zn 0.5 ml/


 Insulin Human


 Regular 40 unit/


 Total Parenteral


 Nutrition/Amino


 Acids/Dextrose/


 Fat Emulsion


 Intravenous  1,400 ml @ 


 58.333 mls/


 hr  TPN  CONT  4/11/20 22:00


 4/12/20 21:59 DC 4/11/20 21:21


58.333 MLS/HR


 


 Sodium Chloride


 90 meq/Potassium


 Phosphate 19 mmol/


 Magnesium Sulfate


 12 meq/Calcium


 Gluconate 15 meq/


 Multivitamins 10


 ml/Chromium/


 Copper/Manganese/


 Seleni/Zn 0.5 ml/


 Insulin Human


 Regular 40 unit/


 Total Parenteral


 Nutrition/Amino


 Acids/Dextrose/


 Fat Emulsion


 Intravenous  1,400 ml @ 


 58.333 mls/


 hr  TPN  CONT  4/12/20 22:00


 4/13/20 21:59 DC 4/12/20 21:54


58.333 MLS/HR


 


 Sodium Chloride


 90 meq/Potassium


 Phosphate 5 mmol/


 Magnesium Sulfate


 12 meq/Calcium


 Gluconate 15 meq/


 Multivitamins 10


 ml/Chromium/


 Copper/Manganese/


 Seleni/Zn 0.5 ml/


 Insulin Human


 Regular 30 unit/


 Total Parenteral


 Nutrition/Amino


 Acids/Dextrose/


 Fat Emulsion


 Intravenous  1,400 ml @ 


 58.333 mls/


 hr  TPN  CONT  4/9/20 22:00


 4/10/20 21:59 DC 4/9/20 22:08


58.333 MLS/HR


 


 Sodium Chloride


 100 meq/Potassium


 Chloride 30 meq/


 Potassium Acetate


 30 meq/Magnesium


 Sulfate 12 meq/


 Multivitamins 10


 ml/Chromium/


 Copper/Manganese/


 Seleni/Zn 1 ml/


 Insulin Human


 Regular 15 unit/


 Total Parenteral


 Nutrition/Amino


 Acids/Dextrose/


 Fat Emulsion


 Intravenous  1,680 ml @ 


 70 mls/hr  TPN  CONT  7/5/20 22:00


 7/6/20 21:59 DC 7/5/20 21:23


70 MLS/HR


 


 Sodium Chloride


 100 meq/Potassium


 Chloride 40 meq/


 Magnesium Sulfate


 15 meq/Calcium


 Gluconate 15 meq/


 Multivitamins 10


 ml/Chromium/


 Copper/Manganese/


 Seleni/Zn 0.5 ml/


 Insulin Human


 Regular 35 unit/


 Total Parenteral


 Nutrition/Amino


 Acids/Dextrose/


 Fat Emulsion


 Intravenous  1,400 ml @ 


 58.333 mls/


 hr  TPN  CONT  4/19/20 22:00


 4/20/20 21:59 DC 4/19/20 22:46


58.333 MLS/HR


 


 Sodium Chloride


 100 meq/Potassium


 Chloride 40 meq/


 Magnesium Sulfate


 20 meq/Calcium


 Gluconate 10 meq/


 Multivitamins 10


 ml/Chromium/


 Copper/Manganese/


 Seleni/Zn 0.5 ml/


 Insulin Human


 Regular 35 unit/


 Total Parenteral


 Nutrition/Amino


 Acids/Dextrose/


 Fat Emulsion


 Intravenous  1,400 ml @ 


 58.333 mls/


 hr  TPN  CONT  4/23/20 22:00


 4/24/20 21:59 DC 4/24/20 00:06


58.333 MLS/HR


 


 Sodium Chloride


 100 meq/Potassium


 Chloride 40 meq/


 Magnesium Sulfate


 20 meq/Calcium


 Gluconate 15 meq/


 Multivitamins 10


 ml/Chromium/


 Copper/Manganese/


 Seleni/Zn 0.5 ml/


 Insulin Human


 Regular 35 unit/


 Total Parenteral


 Nutrition/Amino


 Acids/Dextrose/


 Fat Emulsion


 Intravenous  1,400 ml @ 


 58.333 mls/


 hr  TPN  CONT  4/22/20 22:00


 4/23/20 21:59 DC 4/22/20 22:27


58.333 MLS/HR


 


 Sodium Chloride


 100 meq/Potassium


 Phosphate 10 mmol/


 Magnesium Sulfate


 12 meq/Calcium


 Gluconate 15 meq/


 Multivitamins 10


 ml/Chromium/


 Copper/Manganese/


 Seleni/Zn 0.5 ml/


 Insulin Human


 Regular 35 unit/


 Potassium


 Chloride 20 meq/


 Total Parenteral


 Nutrition/Amino


 Acids/Dextrose/


 Fat Emulsion


 Intravenous  1,400 ml @ 


 58.333 mls/


 hr  TPN  CONT  4/16/20 22:00


 4/17/20 21:59 DC 4/16/20 22:10


58.333 MLS/HR


 


 Sodium Chloride


 100 meq/Potassium


 Phosphate 19 mmol/


 Magnesium Sulfate


 12 meq/Calcium


 Gluconate 15 meq/


 Multivitamins 10


 ml/Chromium/


 Copper/Manganese/


 Seleni/Zn 0.5 ml/


 Insulin Human


 Regular 40 unit/


 Potassium


 Chloride 20 meq/


 Total Parenteral


 Nutrition/Amino


 Acids/Dextrose/


 Fat Emulsion


 Intravenous  1,400 ml @ 


 58.333 mls/


 hr  TPN  CONT  4/15/20 22:00


 4/16/20 21:59 DC 4/15/20 21:20


58.333 MLS/HR


 


 Sodium Chloride


 100 meq/Potassium


 Phosphate 5 mmol/


 Magnesium Sulfate


 12 meq/Calcium


 Gluconate 15 meq/


 Multivitamins 10


 ml/Chromium/


 Copper/Manganese/


 Seleni/Zn 0.5 ml/


 Insulin Human


 Regular 35 unit/


 Potassium


 Chloride 20 meq/


 Total Parenteral


 Nutrition/Amino


 Acids/Dextrose/


 Fat Emulsion


 Intravenous  1,400 ml @ 


 58.333 mls/


 hr  TPN  CONT  4/17/20 22:00


 4/18/20 21:59 DC 4/17/20 22:59


58.333 MLS/HR


 


 Sodium Chloride


 110 meq/Potassium


 Chloride 30 meq/


 Potassium Acetate


 30 meq/Magnesium


 Sulfate 15 meq/


 Multivitamins 10


 ml/Chromium/


 Copper/Manganese/


 Seleni/Zn 1 ml/


 Insulin Human


 Regular 15 unit/


 Total Parenteral


 Nutrition/Amino


 Acids/Dextrose/


 Fat Emulsion


 Intravenous  1,680 ml @ 


 70 mls/hr  TPN  CONT  7/18/20 22:00


 7/19/20 21:59 DC 7/18/20 22:01


70 MLS/HR


 


 Sodium Chloride


 110 meq/Sodium


 Phosphate 10 mmol/


 Potassium


 Chloride 30 meq/


 Potassium Acetate


 30 meq/Magnesium


 Sulfate 15 meq/


 Multivitamins 10


 ml/Chromium/


 Copper/Manganese/


 Seleni/Zn 1 ml/


 Insulin Human


 Regular 15 unit/


 Total Parenteral


 Nutrition/Amino


 Acids/Dextrose/


 Fat Emulsion


 Intravenous  1,680 ml @ 


 70 mls/hr  TPN  CONT  7/19/20 22:00


 7/20/20 21:59 DC 7/19/20 22:03


70 MLS/HR


 


 Sodium Chloride


 120 meq/Sodium


 Phosphate 10 mmol/


 Potassium


 Chloride 30 meq/


 Potassium Acetate


 30 meq/Magnesium


 Sulfate 15 meq/


 Multivitamins 10


 ml/Chromium/


 Copper/Manganese/


 Seleni/Zn 1 ml/


 Insulin Human


 Regular 15 unit/


 Total Parenteral


 Nutrition/Amino


 Acids/Dextrose/


 Fat Emulsion


 Intravenous  1,680 ml @ 


 70 mls/hr  TPN  CONT  7/21/20 22:00


 7/22/20 21:59  7/21/20 22:11


70 MLS/HR


 


 Succinylcholine


 Chloride


  (Anectine)  120 mg  1X  ONCE  3/23/20 08:30


 3/23/20 08:31 DC 3/23/20 08:34


120 MG


 


 Vancomycin HCl


  (Vanco Per


 Pharmacy)  1 each  PRN DAILY  PRN  7/12/20 09:15


 7/15/20 07:41 DC 7/14/20 02:46


1 EACH


 


 Vancomycin HCl


  (Vancomycin


 Random Level)  1 each  1X  ONCE  7/14/20 01:00


 7/14/20 01:01 DC 7/14/20 01:00


1 EACH


 


 Vancomycin HCl


  (Vancomycin


 Trough Level)  1 each  1X  ONCE  7/15/20 09:30


 7/15/20 09:31 Cancel  





 


 Vancomycin HCl


 1.5 gm/Sodium


 Chloride  500 ml @ 


 250 mls/hr  Q12H  7/14/20 10:00


 7/15/20 07:41 DC 7/14/20 22:07


250 MLS/HR


 


 Vancomycin HCl 2


 gm/Sodium Chloride  500 ml @ 


 250 mls/hr  1X  ONCE  7/12/20 10:00


 7/12/20 11:59 DC 7/12/20 10:34


250 MLS/HR


 


 Vasopressin


  (Vasostrict)  20 unit  STK-MED ONCE  6/30/20 12:23


 6/30/20 12:23 DC  





 


 Vasopressin 20


 unit/Dextrose  101 ml @ 


 12 mls/hr  CONT  PRN  6/30/20 15:30


    7/7/20 04:17


12 MLS/HR


 


 Vecuronium Bromide


  (Norcuron Bolus)  6 mg  PRN Q6HRS  PRN  5/7/20 19:15


 5/7/20 19:35 DC  














Labs:


Lab





Laboratory Tests








Test


 7/21/20


12:21 7/21/20


18:19 7/22/20


00:09 7/22/20


05:30


 


Glucose (Fingerstick)


 143 mg/dL


(70-99) 146 mg/dL


(70-99) 147 mg/dL


(70-99) 





 


White Blood Count


 


 


 


 16.7 x10^3/uL


(4.0-11.0)


 


Red Blood Count


 


 


 


 2.48 x10^6/uL


(3.50-5.40)


 


Hemoglobin


 


 


 


 6.9 g/dL


(12.0-15.5)


 


Hematocrit


 


 


 


 21.2 %


(36.0-47.0)


 


Mean Corpuscular Volume    86 fL () 


 


Mean Corpuscular Hemoglobin    28 pg (25-35) 


 


Mean Corpuscular Hemoglobin


Concent 


 


 


 33 g/dL


(31-37)


 


Red Cell Distribution Width


 


 


 


 15.9 %


(11.5-14.5)


 


Platelet Count


 


 


 


 583 x10^3/uL


(140-400)


 


Neutrophils (%) (Auto)    84 % (31-73) 


 


Lymphocytes (%) (Auto)    8 % (24-48) 


 


Monocytes (%) (Auto)    5 % (0-9) 


 


Eosinophils (%) (Auto)    3 % (0-3) 


 


Basophils (%) (Auto)    0 % (0-3) 


 


Neutrophils # (Auto)


 


 


 


 14.0 x10^3/uL


(1.8-7.7)


 


Lymphocytes # (Auto)


 


 


 


 1.3 x10^3/uL


(1.0-4.8)


 


Monocytes # (Auto)


 


 


 


 0.8 x10^3/uL


(0.0-1.1)


 


Eosinophils # (Auto)


 


 


 


 0.6 x10^3/uL


(0.0-0.7)


 


Basophils # (Auto)


 


 


 


 0.0 x10^3/uL


(0.0-0.2)


 


Test


 7/22/20


05:31 


 


 





 


Glucose (Fingerstick)


 150 mg/dL


(70-99) 


 


 











Micro


        NEG ANSON 56


        PSEUDOMONAS AERUGINOSA


        ANTIBIOTIC                        RESULT          INTERPRETATION


        AMIKACIN                          <=16                  S


        AZTREONAM                         >16                   R


        CEFTAZIDIME                       >16                   R


        CIPROFLOXACIN                     <=0.25                S


        CEFEPIME                          16                    I


        GENTAMICIN                        <=2                   S


        LEVOFLOXACIN                      <=0.5                 S














                               ** CONTINUED ON NEXT PAGE **





 

--------------------------------------------------------------------------------


------------





RUN DATE: 06/10/20                  [facility] *LIVE*               

  PAGE 2   


RUN TIME: 1121                            Specimen Inquiry                    


 

--------------------------------------------------------------------------------


------------





SPEC: [id number]    PATIENT: JESENIA BEAN                [id number]  

(Continued)


------------------------------------------------------------

--------------------------------








 

--------------------------------------------------------------------------------


------------





  Procedure                         Result                                      

         


------------------------------------

--------------------------------------------------------





  ANTIMICROBIAL SUSCEPTIBILITY  Preliminary   (continued)


        MEROPENEM                         <=1                   S


        PIPERACILLIN/TAZOBACTAM           64                    S


        TOBRAMYCIN                        <=2                   S


        Unless otherwise specified, Testing Performed by:


        [address] [address]


        For Inquires, the Physician may contact the Microbiology


        department at [phone]





-------------------------------------------------------------------------

-------------------





Objective:


Assessment:


Patient with prolonged hospitalization more than 4 months


Multiple medical problems


Multiple surgical procedures





S/P Exp. Eleonora, REN, naif, G-J tube & pancreatic necrosectomy on 6/30, C. 

parapsilosis & PSAE (I-merrem/ceftazidime/AZT/cefepime))


Leucocytosis -trending upward 


Fever 


Acute gallstone pancreatitis with persistent necrosis


  - 4/9.  CT A/P Increased ascites. Persistent evidence of necrotizing 

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


  - 4/27. status post ROBERT drain placement; C. parapsilosis. s/p drain 5/6 + yeast

& high amylase; s/p additional drain on 5/8. Drains removed. 


  -5/6. fluid  candida parapsilosis fluid, amylase high


  - 6/6 showed multiple pseudocysts, slight larger on the right. s/p drains x 3,

6/7.  + PSAE (MDRO-R Cefepime, Zosyn ANSON < 64) and yeast, 


  -6/7 s/p drain replacement x 3; fluid cult PSAE (MDRO), yeast; treated


  -7/12 CT A/P shows smaller fluid collections.  


  -722 CT abdomen and pelvis drains in place       


Ascites s/p paracentesis 4/15 & 5/6. C. parapsilosis 


Cholelithiasis with thickening of the gallbladder wall.


JED, Hyperkalemia, Metabolic acidosis off dialysis


Acute hypoxic resp failure. trach/vent. sputum 6/13  + PSAE (I merrem) ; sputum 

culture July 19+ for PSAE R Merrem, sensitive to cefepime


Pleural effusion status post CTS left side


 


Abdominal fluid culture 6 /7 MDRO Pseudomonas, yeast





Plan:


Plan of Care


Change Avycaz to cefepime


cont micafungin and dapto


CK 11 on 7/19


repeat bc


f/u psae in sputum


BC from 7/15 neg to date 


Monitor WBC/temp 


Wound care /drain management as directed


Leucocytosis could be from not adequate drainage of intraabdo fluid/abscess ...


Gen surgery following


C diff neg 7/12


Contact isolation for CRE/MDRO


D/w nursing 





Critically ill





Long term prognosis  poor











IVAN FRANZ MD           Jul 22, 2020 07:47

## 2020-07-22 NOTE — PDOC
[facility] PROGRESS NOTE


Chief Complaint


Chief Complaint


Postop day 21 (Exploratory laparotomy, lysis of adhesions, subtotal 

cholecystectomy with cholangiogram, gastrojejunostomy tube placement, pancreatic

necrosectomy)


Acute hypoxic Respiratory failure required  mechanical ventilation


Tracheostomy


bilateral pleural effusions/pulm edema s/p Throacentesis on 6/15/2020


Severe Acute gallstone pancreatitis (not a surgical candidate at this time) with

necrosis


Acute kidney failure now requiring dialysis


Gallstones (Calculus of gallbladder with acute cholecystitis without 

obstruction)


HTN 


Intractable pain


Intractable nausea


Covid 19 negative. 


Acute on chronic anemia 


EEG: No seizure activity


Fever  - intermittent 


? Ileus with vomiting


Abd distention - U/S and CT reviewed s/p 0.4 L of opaque, debris-containing 

ascites was removed 5/6


Acute pancreatitis with persistent necrosis


Gallstone pancreatitis with necrosis. 


   -CT A/P 6/6 showed multiple pseudocysts, slight larger on the right. s/p 

drains x 3, 6/7.  + PSAE (MDRO-R Cefepime, Zosyn ANSON < 64) and yeast, 


   -s/p drain 4/27. C. parapsilosis. s/p drain 5/6 + yeast & high amylase; s/p 

additional drain on 5/8. Drains removed. 


Ascites s/p paracentesis 4/15 & 5/6. C. parapsilosis 


JED. off HD. 


A large fluid collection in the pancreatic bed has slightly decreased in size, d

escribed below, the pancreas itself is difficult to  visualize, which could be 

due to necrosis or obscuration of pancreatic  parenchyma from the surrounding 

fluid collection.6/15 


- 4/27 status post ROBERT drain placement + C paropsilosis. s/p additional drains 

5/8


Anemia - S/p PRBCs. 


Cholelithiasis with thickening of the gallbladder wall.


Leucocytosis improving


JED, hyperkalemia, Metabolic acidosis off dialysis


hypocalcemia 


Prediabetes


HTN


s/p trach


Hyperglycemia


severe protein-caloric malnutrition


Moderate to large left pleural effusion with atelectasis and collapse  of most 

of the left lower lobe, stable





History of Present Illness


History of Present Illness


7/22/2020


Patient seen and examined in the ICU


She is still extremely critically ill


Appears extremely weak frail and pale


Discussed with RN


Chart reviewed





7/21/2020


Patient seen and examined in the ICU


She is still extremely critically ill


Appears extremely weak frail and pale


Discussed with RN


Chart reviewed





Vitals/I&O


Vitals/I&O:





                                   Vital Signs








  Date Time  Temp Pulse Resp B/P (MAP) Pulse Ox O2 Delivery O2 Flow Rate FiO2


 


7/22/20 12:40     100 Room Air  


 


7/22/20 11:41 100.4 139 32 125/81    





 100.4       


 


7/22/20 09:00       1.0 














                                    I & O   


 


 7/21/20 7/21/20 7/22/20





 15:00 23:00 07:00


 


Intake Total 1186.7 ml 100 ml 1156 ml


 


Output Total 390 ml 1385 ml 820 ml


 


Balance 796.7 ml -1285 ml 336 ml











Physical Exam


Physical Exam:


GENERAL: Sitting in chair, NAD 


HEENT: Pupils equal, oral cavity dry. NGT out


NECK:  Tracheostomy 


LUNGS: Diminished aeration bases,  CT on left 


HEART:  S1, S2, regular, tachy 110s 


ABDOMEN: Distended, bowel sounds hypoactive, soft, richardson x 2, 3 ROBERT drains, G-J 

tube


: [place] in place 


EXTREMITIES: Trace generalized edema, no cyanosis. MARTHA hose bilaterally, 


SKIN: warm touch. No signs of rash.  


NEURO: Awake and cooperative


LUE-PICC without signs of complications


General:  Alert, Cooperative


Heart:  Regular rate (SR/ST), Other (distant heart sounds)


Lungs:  Crackles, Other (l ct)


Abdomen:  Soft, No tenderness, Other (drains in place, min output on some)


Extremities:  Other (Diffuse edema)


Skin:  No rashes, No significant lesion





Labs


Labs:





Laboratory Tests








Test


 7/21/20


18:19 7/22/20


00:09 7/22/20


05:30 7/22/20


05:31


 


Glucose (Fingerstick)


 146 mg/dL


(70-99) 147 mg/dL


(70-99) 


 150 mg/dL


(70-99)


 


White Blood Count


 


 


 16.7 x10^3/uL


(4.0-11.0) 





 


Red Blood Count


 


 


 2.48 x10^6/uL


(3.50-5.40) 





 


Hemoglobin


 


 


 6.9 g/dL


(12.0-15.5) 





 


Hematocrit


 


 


 21.2 %


(36.0-47.0) 





 


Mean Corpuscular Volume   86 fL ()  


 


Mean Corpuscular Hemoglobin   28 pg (25-35)  


 


Mean Corpuscular Hemoglobin


Concent 


 


 33 g/dL


(31-37) 





 


Red Cell Distribution Width


 


 


 15.9 %


(11.5-14.5) 





 


Platelet Count


 


 


 583 x10^3/uL


(140-400) 





 


Neutrophils (%) (Auto)   84 % (31-73)  


 


Lymphocytes (%) (Auto)   8 % (24-48)  


 


Monocytes (%) (Auto)   5 % (0-9)  


 


Eosinophils (%) (Auto)   3 % (0-3)  


 


Basophils (%) (Auto)   0 % (0-3)  


 


Neutrophils # (Auto)


 


 


 14.0 x10^3/uL


(1.8-7.7) 





 


Lymphocytes # (Auto)


 


 


 1.3 x10^3/uL


(1.0-4.8) 





 


Monocytes # (Auto)


 


 


 0.8 x10^3/uL


(0.0-1.1) 





 


Eosinophils # (Auto)


 


 


 0.6 x10^3/uL


(0.0-0.7) 





 


Basophils # (Auto)


 


 


 0.0 x10^3/uL


(0.0-0.2) 














Assessment and Plan


Assessmemt and Plan


Problems


Medical Problems:


(1) Acute pancreatitis


Status: Acute  





(2) Cholelithiasis


Status: Acute  





ASSESSMENT


Postop day 22 (Exploratory laparotomy, lysis of adhesions, subtotal 

cholecystectomy with cholangiogram, gastrojejunostomy tube placement, pancreatic

necrosectomy)


Tracheostomy


bilateral pleural effusions/pulm edema s/p Throacentesis on 6/15/2020


HTN 


Covid 19 negative. 


Acute on chronic anemia 


Fever  - intermittent 


Ascites s/p paracentesis 4/15 & 5/6. C. parapsilosis 


JED. off HD. 


A large fluid collection in the pancreatic bed has slightly decreased in size, 

described below, the pancreas itself is difficult to  visualize, which could be 

due to necrosis or obscuration of pancreatic  parenchyma from the surrounding 

fluid collection.6/15 


- 4/27 status post ROBERT drain placement + C paropsilosis. s/p additional drains 

5/8


Anemia 


Leucocytosis improving


hypocalcemia 


Prediabetes


HTN


s/p trach


Hyperglycemia


severe protein-caloric malnutrition


Moderate to large left pleural effusion with atelectasis and collapse  of most 

of the left lower lobe, stable


 





Plan:


ICU monitoring


TPN


Aggressive wound care


Trend labs


Follow cultures 


Follow Hg, 6.9


Transfuse 1 unit PRBC


meropenam, cipro, micafungin per ID


PT/OT 


PRN trach shield


ID, gen sx, GI, pulm following 


ROBERT drain


DVT GI prophylaxis 


xanax for anxiety prn


Appreciate subspecialist input


Long-term prognosis extremely guarded








Comment


Review of Relevant


I have reviewed the following items josy (where applicable) has been applied.


Medications:





Current Medications








 Medications


  (Trade)  Dose


 Ordered  Sig/Yee


 Route


 PRN Reason  Start Time


 Stop Time Status Last Admin


Dose Admin


 


 Ceftazidime/


 Avibactam 2.5 gm/


 Sodium Chloride  100 ml @ 


 50 mls/hr  Q8HRS


 IV


   7/21/20 14:00


 7/22/20 07:48 DC 7/22/20 05:32





 


 Sodium Chloride


 120 meq/Sodium


 Phosphate 10 mmol/


 Potassium


 Chloride 30 meq/


 Potassium Acetate


 30 meq/Magnesium


 Sulfate 15 meq/


 Multivitamins 10


 ml/Chromium/


 Copper/Manganese/


 Seleni/Zn 1 ml/


 Insulin Human


 Regular 15 unit/


 Total Parenteral


 Nutrition/Amino


 Acids/Dextrose/


 Fat Emulsion


 Intravenous  1,680 ml @ 


 70 mls/hr  TPN  CONT


 IV


   7/21/20 22:00


 7/22/20 21:59  7/21/20 22:11





 


 Cefepime HCl


  (Maxipime)  2 gm  Q12HR


 IVP


   7/22/20 09:00


    7/22/20 09:02














Justicifation of Admission Dx:


Justifications for Admission:


Justification of Admission Dx:  Yes











HECTOR MASON III DO           Jul 22, 2020 12:47

## 2020-07-22 NOTE — NUR
Wound Care

Incisional wound vac changed over the midline abdomen, penrose drain in place with scant 
drainage at distal opening of incision. Penrose removed, per Dr. Flores's order, pt tolerated 
well. Incision line well approximated and mostly epithelialized, other than a small area of 
separation approx  0.8 x 0.3 x  0.3  cm, surrounding  skin appears epithelialized, photo in 
chart. Periwound draped, contact layer placed over incision line with silver foam at -125 
mmHg continuous suction. All drain dressings removed, cleaned with chloroprep and new split 
gauze sponges reapplied and taped, pt tolerated dressing changes well. RLQ has a small  area 
of skin erosion  from a previous drain site, wound cleaned, measured, photographed and 
redressed with Aquacel AG and gauze. WC will continue to follow for vac management.

## 2020-07-22 NOTE — NUR
Pharmacy TPN Dosing Note



S: JESENIA BEAN is a 49 year old F Currently receiving Central Continuous TPN started 
03/18/20



B:Pertinent PMH: Necrotizing pancreatitis

Height: 5 feet, 8 inches

Weight: 87.8 kg



Current diet: NPO 



LABS:

Sodium:    136 

Potassium: 4.2 

Chloride:  102 

Calcium:   9.6 

Corrected Calcium: 11.92 

Magnesium: 2.1 

CO2:       30 

SCr:       0.5 

Glucose:   114-150 

Albumin:   1.1 

AST:       25 

ALT:       37 



TPN FORMULA:

TPN TYPE:  Central Continuous

AMINO ACIDS:         95 gm

DEXTROSE:            250 gm

LIPIDS:              30 gm



SODIUM CHLORIDE:     120 mEq

SODIUM PHOSPHATE:    10 mmol

POTASSIUM CHLORIDE:  30 mEq

POTASSIUM ACETATE:   30 mEq

MAGNESIUM:           15 mEq

INSULIN:             15 units

MULTIPLE VITAMIN:    10 ml

TRACE ELEMENTS:      1 ml ml(s)



TPN PLAN:  

Macro's adjusted per dietician rec's. Labs ordered for AM.





R: Adjust TPN per plan and ordered formula

Will monitor electrolytes, glucose, and tolerance to TPN.



 Maribell Vazquez RPH, 07/22/20 0935

## 2020-07-22 NOTE — NUR
SS following up with discharge planning. SS reviewed pt chart and discussed with pt RN. WBC 
improving. Pt on IV Micafungin, Daptomycin, and Cefepime. Pt on TPN. Pt has chest tube and G 
tube. Pt has two ROBERT drains and one Avi drain. Pt now on room air. Per RN. Pt received 
one unit of blood today. [employer] reported that they are still working on making contact 
with pt's daughters to discuss disability application. SS will continue to follow for 
discharge planning.

## 2020-07-23 VITALS — DIASTOLIC BLOOD PRESSURE: 90 MMHG | SYSTOLIC BLOOD PRESSURE: 145 MMHG

## 2020-07-23 VITALS — DIASTOLIC BLOOD PRESSURE: 66 MMHG | SYSTOLIC BLOOD PRESSURE: 122 MMHG

## 2020-07-23 VITALS — SYSTOLIC BLOOD PRESSURE: 126 MMHG | DIASTOLIC BLOOD PRESSURE: 73 MMHG

## 2020-07-23 VITALS — SYSTOLIC BLOOD PRESSURE: 115 MMHG | DIASTOLIC BLOOD PRESSURE: 71 MMHG

## 2020-07-23 VITALS — SYSTOLIC BLOOD PRESSURE: 109 MMHG | DIASTOLIC BLOOD PRESSURE: 62 MMHG

## 2020-07-23 VITALS — SYSTOLIC BLOOD PRESSURE: 141 MMHG | DIASTOLIC BLOOD PRESSURE: 97 MMHG

## 2020-07-23 LAB
ANION GAP SERPL CALC-SCNC: 4 MMOL/L (ref 6–14)
BASOPHILS # BLD AUTO: 0.1 X10^3/UL (ref 0–0.2)
BASOPHILS NFR BLD: 1 % (ref 0–3)
BUN SERPL-MCNC: 14 MG/DL (ref 7–20)
CALCIUM SERPL-MCNC: 9.7 MG/DL (ref 8.5–10.1)
CHLORIDE SERPL-SCNC: 102 MMOL/L (ref 98–107)
CO2 SERPL-SCNC: 28 MMOL/L (ref 21–32)
CREAT SERPL-MCNC: 0.5 MG/DL (ref 0.6–1)
EOSINOPHIL NFR BLD: 0.6 X10^3/UL (ref 0–0.7)
EOSINOPHIL NFR BLD: 3 % (ref 0–3)
ERYTHROCYTE [DISTWIDTH] IN BLOOD BY AUTOMATED COUNT: 15.6 % (ref 11.5–14.5)
GFR SERPLBLD BASED ON 1.73 SQ M-ARVRAT: 131.1 ML/MIN
GLUCOSE SERPL-MCNC: 121 MG/DL (ref 70–99)
HCT VFR BLD CALC: 25 % (ref 36–47)
HGB BLD-MCNC: 8.2 G/DL (ref 12–15.5)
LYMPHOCYTES # BLD: 1.2 X10^3/UL (ref 1–4.8)
LYMPHOCYTES NFR BLD AUTO: 6 % (ref 24–48)
MAGNESIUM SERPL-MCNC: 1.9 MG/DL (ref 1.8–2.4)
MCH RBC QN AUTO: 28 PG (ref 25–35)
MCHC RBC AUTO-ENTMCNC: 33 G/DL (ref 31–37)
MCV RBC AUTO: 86 FL (ref 79–100)
MONO #: 0.9 X10^3/UL (ref 0–1.1)
MONOCYTES NFR BLD: 5 % (ref 0–9)
NEUT #: 15.7 X10^3/UL (ref 1.8–7.7)
NEUTROPHILS NFR BLD AUTO: 85 % (ref 31–73)
PHOSPHATE SERPL-MCNC: 4.1 MG/DL (ref 2.6–4.7)
PLATELET # BLD AUTO: 532 X10^3/UL (ref 140–400)
POTASSIUM SERPL-SCNC: 4.4 MMOL/L (ref 3.5–5.1)
RBC # BLD AUTO: 2.92 X10^6/UL (ref 3.5–5.4)
SODIUM SERPL-SCNC: 134 MMOL/L (ref 136–145)
TRIGL SERPL-MCNC: 181 MG/DL (ref 0–150)
WBC # BLD AUTO: 18.4 X10^3/UL (ref 4–11)

## 2020-07-23 RX ADMIN — INSULIN LISPRO SCH UNITS: 100 INJECTION, SOLUTION INTRAVENOUS; SUBCUTANEOUS at 18:14

## 2020-07-23 RX ADMIN — METOPROLOL TARTRATE PRN MG: 5 INJECTION INTRAVENOUS at 12:30

## 2020-07-23 RX ADMIN — IPRATROPIUM BROMIDE AND ALBUTEROL SULFATE SCH ML: .5; 3 SOLUTION RESPIRATORY (INHALATION) at 01:39

## 2020-07-23 RX ADMIN — ONDANSETRON PRN MG: 2 INJECTION INTRAMUSCULAR; INTRAVENOUS at 20:49

## 2020-07-23 RX ADMIN — DEXTROSE SCH MLS/HR: 50 INJECTION, SOLUTION INTRAVENOUS at 09:06

## 2020-07-23 RX ADMIN — INSULIN LISPRO SCH UNITS: 100 INJECTION, SOLUTION INTRAVENOUS; SUBCUTANEOUS at 01:39

## 2020-07-23 RX ADMIN — FENTANYL CITRATE PRN MCG: 50 INJECTION INTRAMUSCULAR; INTRAVENOUS at 22:32

## 2020-07-23 RX ADMIN — CEFTAZIDIME, AVIBACTAM SCH MLS/HR: 2; .5 POWDER, FOR SOLUTION INTRAVENOUS at 22:00

## 2020-07-23 RX ADMIN — IPRATROPIUM BROMIDE AND ALBUTEROL SULFATE SCH ML: .5; 3 SOLUTION RESPIRATORY (INHALATION) at 19:49

## 2020-07-23 RX ADMIN — IPRATROPIUM BROMIDE AND ALBUTEROL SULFATE SCH ML: .5; 3 SOLUTION RESPIRATORY (INHALATION) at 03:24

## 2020-07-23 RX ADMIN — BACITRACIN SCH MLS/HR: 5000 INJECTION, POWDER, FOR SOLUTION INTRAMUSCULAR at 14:33

## 2020-07-23 RX ADMIN — Medication PRN EACH: at 08:53

## 2020-07-23 RX ADMIN — FENTANYL CITRATE PRN MCG: 50 INJECTION INTRAMUSCULAR; INTRAVENOUS at 05:57

## 2020-07-23 RX ADMIN — FENTANYL CITRATE PRN MCG: 50 INJECTION INTRAMUSCULAR; INTRAVENOUS at 16:32

## 2020-07-23 RX ADMIN — DAPTOMYCIN SCH MLS/HR: 500 INJECTION, POWDER, LYOPHILIZED, FOR SOLUTION INTRAVENOUS at 08:55

## 2020-07-23 RX ADMIN — IPRATROPIUM BROMIDE AND ALBUTEROL SULFATE SCH ML: .5; 3 SOLUTION RESPIRATORY (INHALATION) at 11:50

## 2020-07-23 RX ADMIN — PROCHLORPERAZINE EDISYLATE PRN MG: 5 INJECTION INTRAMUSCULAR; INTRAVENOUS at 13:45

## 2020-07-23 RX ADMIN — INSULIN LISPRO SCH UNITS: 100 INJECTION, SOLUTION INTRAVENOUS; SUBCUTANEOUS at 06:04

## 2020-07-23 RX ADMIN — ENOXAPARIN SODIUM SCH MG: 40 INJECTION SUBCUTANEOUS at 10:54

## 2020-07-23 RX ADMIN — IPRATROPIUM BROMIDE AND ALBUTEROL SULFATE SCH ML: .5; 3 SOLUTION RESPIRATORY (INHALATION) at 15:26

## 2020-07-23 RX ADMIN — CEFTAZIDIME, AVIBACTAM SCH MLS/HR: 2; .5 POWDER, FOR SOLUTION INTRAVENOUS at 15:04

## 2020-07-23 RX ADMIN — ACETYLCYSTEINE SCH MG: 200 INHALANT RESPIRATORY (INHALATION) at 08:00

## 2020-07-23 RX ADMIN — ONDANSETRON PRN MG: 2 INJECTION INTRAMUSCULAR; INTRAVENOUS at 11:49

## 2020-07-23 RX ADMIN — CEFTAZIDIME, AVIBACTAM SCH MLS/HR: 2; .5 POWDER, FOR SOLUTION INTRAVENOUS at 08:57

## 2020-07-23 RX ADMIN — IPRATROPIUM BROMIDE AND ALBUTEROL SULFATE SCH ML: .5; 3 SOLUTION RESPIRATORY (INHALATION) at 08:00

## 2020-07-23 RX ADMIN — Medication PRN EACH: at 11:42

## 2020-07-23 RX ADMIN — HYDROCODONE BITARTRATE AND ACETAMINOPHEN PRN TAB: 5; 325 TABLET ORAL at 20:49

## 2020-07-23 RX ADMIN — PANTOPRAZOLE SODIUM SCH MG: 40 INJECTION, POWDER, FOR SOLUTION INTRAVENOUS at 08:48

## 2020-07-23 RX ADMIN — INSULIN LISPRO SCH UNITS: 100 INJECTION, SOLUTION INTRAVENOUS; SUBCUTANEOUS at 12:00

## 2020-07-23 RX ADMIN — ACETYLCYSTEINE SCH MG: 200 INHALANT RESPIRATORY (INHALATION) at 19:49

## 2020-07-23 RX ADMIN — METOPROLOL TARTRATE PRN MG: 5 INJECTION INTRAVENOUS at 21:19

## 2020-07-23 NOTE — PDOC
[facility] PROGRESS NOTE


Chief Complaint


Chief Complaint


Postop (Exploratory laparotomy, lysis of adhesions, subtotal cholecystectomy 

with cholangiogram, gastrojejunostomy tube placement, pancreatic necrosectomy)


Acute hypoxic Respiratory failure required  mechanical ventilation


Tracheostomy


bilateral pleural effusions/pulm edema s/p Throacentesis on 6/15/2020


Severe Acute gallstone pancreatitis (not a surgical candidate at this time) with

necrosis


Acute kidney failure now requiring dialysis


Gallstones (Calculus of gallbladder with acute cholecystitis without obstruc

tion)


HTN 


Intractable pain


Intractable nausea


Covid 19 negative. 


Acute on chronic anemia 


EEG: No seizure activity


Fever  - intermittent 


? Ileus with vomiting


Abd distention - U/S and CT reviewed s/p 0.4 L of opaque, debris-containing 

ascites was removed 5/6


Acute pancreatitis with persistent necrosis


Gallstone pancreatitis with necrosis. 


   -CT A/P 6/6 showed multiple pseudocysts, slight larger on the right. s/p 

drains x 3, 6/7.  + PSAE (MDRO-R Cefepime, Zosyn ANSON < 64) and yeast, 


   -s/p drain 4/27. C. parapsilosis. s/p drain 5/6 + yeast & high amylase; s/p 

additional drain on 5/8. Drains removed. 


Ascites s/p paracentesis 4/15 & 5/6. C. parapsilosis 


JED. off HD. 


A large fluid collection in the pancreatic bed has slightly decreased in size, 

described below, the pancreas itself is difficult to  visualize, which could be 

due to necrosis or obscuration of pancreatic  parenchyma from the surrounding 

fluid collection.6/15 


- 4/27 status post ROBERT drain placement + C paropsilosis. s/p additional drains 

5/8


Anemia - S/p PRBCs. 


Cholelithiasis with thickening of the gallbladder wall.


Leucocytosis improving


JED, hyperkalemia, Metabolic acidosis off dialysis


hypocalcemia 


Prediabetes


HTN


s/p trach


Hyperglycemia


severe protein-caloric malnutrition


Moderate to large left pleural effusion with atelectasis and collapse  of most 

of the left lower lobe, stable





History of Present Illness


History of Present Illness


7/23/2020


Patient seen in and examined in the ICU


She is still extremely critically ill


Appears weak, frail, and pale


Better color today with improved eye contact and expression


Discussed with RN


Chart reviewed





7/22/2020


Patient seen and examined in the ICU


She is still extremely critically ill


Appears extremely weak frail and pale


Discussed with RN


Chart reviewed





7/21/2020


Patient seen and examined in the ICU


She is still extremely critically ill


Appears extremely weak frail and pale


Discussed with RN


Chart reviewed





Vitals/I&O


Vitals/I&O:





                                   Vital Signs








  Date Time  Temp Pulse Resp B/P (MAP) Pulse Ox O2 Delivery O2 Flow Rate FiO2


 


7/23/20 12:30  146  145/90    


 


7/23/20 12:00 97.9  28  94 Room Air  





 97.9       


 


7/23/20 08:43       2.0 














                                    I & O   


 


 7/22/20 7/22/20 7/23/20





 15:00 23:00 07:00


 


Intake Total 100 ml  


 


Output Total 940 ml 860 ml 1060 ml


 


Balance -840 ml -860 ml -1060 ml











Physical Exam


Physical Exam:


GENERAL: sitting in chair, mild distress 


HEENT: Pupils equal, oral cavity dry. NGT out


NECK:  Tracheostomy 


LUNGS: Diminished aeration bases,  CT on left 


HEART:  S1, S2, regular, tachy 110s 


ABDOMEN: Distended, bowel sounds hypoactive, soft, richardson x 2, 3 ROBERT drains, G-J 

tube


: [place] in place 


EXTREMITIES: Trace generalized edema, no cyanosis. MARTHA hose bilaterally, 


SKIN: warm touch. No signs of rash.  


LUE-PICC without signs of complications


General:  Alert, Cooperative, No acute distress


Heart:  Other (distant heart sounds, tachycardic)


Lungs:  Crackles, Other (l ct)


Abdomen:  Soft, No tenderness, Other (drains with drainage)


Extremities:  Other (Diffuse edema)


Skin:  No rashes, No significant lesion





Labs


Labs:





Laboratory Tests








Test


 7/22/20


13:32 7/22/20


17:43 7/23/20


01:25 7/23/20


06:00


 


Glucose (Fingerstick)


 144 mg/dL


(70-99) 134 mg/dL


(70-99) 132 mg/dL


(70-99) 





 


Sodium Level


 


 


 


 134 mmol/L


(136-145)


 


Potassium Level


 


 


 


 4.4 mmol/L


(3.5-5.1)


 


Chloride Level


 


 


 


 102 mmol/L


()


 


Carbon Dioxide Level


 


 


 


 28 mmol/L


(21-32)


 


Anion Gap    4 (6-14) 


 


Blood Urea Nitrogen


 


 


 


 14 mg/dL


(7-20)


 


Creatinine


 


 


 


 0.5 mg/dL


(0.6-1.0)


 


Estimated GFR


(Cockcroft-Gault) 


 


 


 131.1 





 


Glucose Level


 


 


 


 121 mg/dL


(70-99)


 


Calcium Level


 


 


 


 9.7 mg/dL


(8.5-10.1)


 


Phosphorus Level


 


 


 


 4.1 mg/dL


(2.6-4.7)


 


Magnesium Level


 


 


 


 1.9 mg/dL


(1.8-2.4)


 


Triglycerides Level


 


 


 


 181 mg/dL


(0-150)


 


Test


 7/23/20


06:04 7/23/20


09:00 7/23/20


12:26 





 


Glucose (Fingerstick)


 117 mg/dL


(70-99) 


 143 mg/dL


(70-99) 





 


White Blood Count


 


 18.4 x10^3/uL


(4.0-11.0) 


 





 


Red Blood Count


 


 2.92 x10^6/uL


(3.50-5.40) 


 





 


Hemoglobin


 


 8.2 g/dL


(12.0-15.5) 


 





 


Hematocrit


 


 25.0 %


(36.0-47.0) 


 





 


Mean Corpuscular Volume  86 fL ()   


 


Mean Corpuscular Hemoglobin  28 pg (25-35)   


 


Mean Corpuscular Hemoglobin


Concent 


 33 g/dL


(31-37) 


 





 


Red Cell Distribution Width


 


 15.6 %


(11.5-14.5) 


 





 


Platelet Count


 


 532 x10^3/uL


(140-400) 


 





 


Neutrophils (%) (Auto)  85 % (31-73)   


 


Lymphocytes (%) (Auto)  6 % (24-48)   


 


Monocytes (%) (Auto)  5 % (0-9)   


 


Eosinophils (%) (Auto)  3 % (0-3)   


 


Basophils (%) (Auto)  1 % (0-3)   


 


Neutrophils # (Auto)


 


 15.7 x10^3/uL


(1.8-7.7) 


 





 


Lymphocytes # (Auto)


 


 1.2 x10^3/uL


(1.0-4.8) 


 





 


Monocytes # (Auto)


 


 0.9 x10^3/uL


(0.0-1.1) 


 





 


Eosinophils # (Auto)


 


 0.6 x10^3/uL


(0.0-0.7) 


 





 


Basophils # (Auto)


 


 0.1 x10^3/uL


(0.0-0.2) 


 














Assessment and Plan


Assessmemt and Plan


Problems


Medical Problems:


(1) Acute pancreatitis


Status: Acute  





(2) Cholelithiasis


Status: Acute  





ASSESSMENT


Postop (Exploratory laparotomy, lysis of adhesions, subtotal cholecystectomy 

with cholangiogram, gastrojejunostomy tube placement, pancreatic necrosectomy)


Acute hypoxic Respiratory failure required  mechanical ventilation


Tracheostomy


bilateral pleural effusions/pulm edema s/p Throacentesis on 6/15/2020


Severe Acute gallstone pancreatitis (not a surgical candidate at this time) with

necrosis


HTN 


Intractable pain


Intractable nausea


Covid 19 negative. 


Acute on chronic anemia 


EEG: No seizure activity


Fever  - intermittent 


Abd distention - U/S and CT reviewed s/p 0.4 L of opaque, debris-containing 

ascites was removed 5/6


Acute pancreatitis with persistent necrosis


Gallstone pancreatitis with necrosis. 


   -CT A/P 6/6 showed multiple pseudocysts, slight larger on the right. s/p 

drains x 3, 6/7.  + PSAE (MDRO-R Cefepime, Zosyn ANSON < 64) and yeast, 


   -s/p drain 4/27. C. parapsilosis. s/p drain 5/6 + yeast & high amylase; s/p 

additional drain on 5/8. Drains removed. 


Ascites s/p paracentesis 4/15 & 5/6. C. parapsilosis 


A large fluid collection in the pancreatic bed has slightly decreased in size, 

described below, the pancreas itself is difficult to  visualize, which could be 

due to necrosis or obscuration of pancreatic  parenchyma from the surrounding 

fluid collection.6/15 


- 4/27 status post ROBERT drain placement + C paropsilosis. s/p additional drains 

5/8


Anemia - S/p PRBCs. 


Leucocytosis improving


JED, hyperkalemia, Metabolic acidosis off dialysis


hypocalcemia 


Prediabetes


HTN


s/p trach


Hyperglycemia


severe protein-caloric malnutrition


Moderate to large left pleural effusion with atelectasis and collapse  of most 

of the left lower lobe, stable





PLAN


ICU monitoring


TPN


Aggressive wound care


Trend labs


Follow cultures 


Follow Hg


Meropenam, cipro, micafungin per ID


PT/OT 


PRN trach shield


ID, gen sx, GI, pulm following 


ROBERT drain


DVT GI prophylaxis 


Xanax for anxiety qid


Appreciate subspecialist input


Long-term prognosis extremely guarded











Comment


Review of Relevant


I have reviewed the following items josy (where applicable) has been applied.


Medications:





Current Medications








 Medications


  (Trade)  Dose


 Ordered  Sig/Yee


 Route


 PRN Reason  Start Time


 Stop Time Status Last Admin


Dose Admin


 


 Sodium Chloride


 120 meq/Sodium


 Phosphate 10 mmol/


 Potassium


 Chloride 30 meq/


 Potassium Acetate


 30 meq/Magnesium


 Sulfate 15 meq/


 Multivitamins 10


 ml/Chromium/


 Copper/Manganese/


 Seleni/Zn 1 ml/


 Insulin Human


 Regular 15 unit/


 Total Parenteral


 Nutrition/Amino


 Acids/Dextrose/


 Fat Emulsion


 Intravenous  1,680 ml @ 


 70 mls/hr  TPN  CONT


 IV


   7/22/20 22:00


 7/23/20 21:59  7/22/20 21:25





 


 Ceftazidime/


 Avibactam 2.5 gm/


 Sodium Chloride  250 ml @ 


 125 mls/hr  Q8HRS


 IV


   7/23/20 08:00


    7/23/20 08:57














Justicifation of Admission Dx:


Justifications for Admission:


Justification of Admission Dx:  Yes











HECTOR MASON III DO           Jul 23, 2020 13:31

## 2020-07-23 NOTE — NUR
SS following up with discharge planning. SS reviewed pt chart and discussed with pt RN. Pt 
wearing speaking valve today. Pt on TPN, IV Avycaz, IV Daptomycin, and IV Micafungin. Pt 
hemoglobin 8.2 now. Pt has ROBERT drains x2 and Avi dran x1. Pt has chest tube and G tube. 
COVID19 negative. Pt currently on room air. SS will continue to follow for discharge 
planning.

## 2020-07-23 NOTE — PDOC
SURGICAL PROGRESS NOTE


Subjective


 in chair


sleeping


Vital Signs





Vital Signs








  Date Time  Temp Pulse Resp B/P (MAP) Pulse Ox O2 Delivery O2 Flow Rate FiO2


 


7/23/20 12:30  146  145/90    


 


7/23/20 12:00 97.9  28  94 Room Air  





 97.9       


 


7/23/20 08:43       2.0 








I&O











Intake and Output 


 


 7/23/20





 07:00


 


Intake Total 100 ml


 


Output Total 2860 ml


 


Balance -2760 ml


 


 


 


IV Total 100 ml


 


Output Urine Total 1600 ml


 


Gastric Drainage Total 320 ml


 


Chest Tube Drainage Total 100 ml


 


Drainage Total 840 ml








PATIENT HAS A VILLASENOR:  Yes


General:  Cooperative, No acute distress


Abdomen:  Soft, Other (multiple drains in place)


Labs





Laboratory Tests








Test


 7/21/20


18:19 7/22/20


00:09 7/22/20


05:30 7/22/20


05:31


 


Glucose (Fingerstick)


 146 mg/dL


(70-99) 147 mg/dL


(70-99) 


 150 mg/dL


(70-99)


 


White Blood Count


 


 


 16.7 x10^3/uL


(4.0-11.0) 





 


Red Blood Count


 


 


 2.48 x10^6/uL


(3.50-5.40) 





 


Hemoglobin


 


 


 6.9 g/dL


(12.0-15.5) 





 


Hematocrit


 


 


 21.2 %


(36.0-47.0) 





 


Mean Corpuscular Volume   86 fL ()  


 


Mean Corpuscular Hemoglobin   28 pg (25-35)  


 


Mean Corpuscular Hemoglobin


Concent 


 


 33 g/dL


(31-37) 





 


Red Cell Distribution Width


 


 


 15.9 %


(11.5-14.5) 





 


Platelet Count


 


 


 583 x10^3/uL


(140-400) 





 


Neutrophils (%) (Auto)   84 % (31-73)  


 


Lymphocytes (%) (Auto)   8 % (24-48)  


 


Monocytes (%) (Auto)   5 % (0-9)  


 


Eosinophils (%) (Auto)   3 % (0-3)  


 


Basophils (%) (Auto)   0 % (0-3)  


 


Neutrophils # (Auto)


 


 


 14.0 x10^3/uL


(1.8-7.7) 





 


Lymphocytes # (Auto)


 


 


 1.3 x10^3/uL


(1.0-4.8) 





 


Monocytes # (Auto)


 


 


 0.8 x10^3/uL


(0.0-1.1) 





 


Eosinophils # (Auto)


 


 


 0.6 x10^3/uL


(0.0-0.7) 





 


Basophils # (Auto)


 


 


 0.0 x10^3/uL


(0.0-0.2) 





 


Test


 7/22/20


13:32 7/22/20


17:43 7/23/20


01:25 7/23/20


06:00


 


Glucose (Fingerstick)


 144 mg/dL


(70-99) 134 mg/dL


(70-99) 132 mg/dL


(70-99) 





 


Sodium Level


 


 


 


 134 mmol/L


(136-145)


 


Potassium Level


 


 


 


 4.4 mmol/L


(3.5-5.1)


 


Chloride Level


 


 


 


 102 mmol/L


()


 


Carbon Dioxide Level


 


 


 


 28 mmol/L


(21-32)


 


Anion Gap    4 (6-14) 


 


Blood Urea Nitrogen


 


 


 


 14 mg/dL


(7-20)


 


Creatinine


 


 


 


 0.5 mg/dL


(0.6-1.0)


 


Estimated GFR


(Cockcroft-Gault) 


 


 


 131.1 





 


Glucose Level


 


 


 


 121 mg/dL


(70-99)


 


Calcium Level


 


 


 


 9.7 mg/dL


(8.5-10.1)


 


Phosphorus Level


 


 


 


 4.1 mg/dL


(2.6-4.7)


 


Magnesium Level


 


 


 


 1.9 mg/dL


(1.8-2.4)


 


Triglycerides Level


 


 


 


 181 mg/dL


(0-150)


 


Test


 7/23/20


06:04 7/23/20


09:00 7/23/20


12:26 





 


Glucose (Fingerstick)


 117 mg/dL


(70-99) 


 143 mg/dL


(70-99) 





 


White Blood Count


 


 18.4 x10^3/uL


(4.0-11.0) 


 





 


Red Blood Count


 


 2.92 x10^6/uL


(3.50-5.40) 


 





 


Hemoglobin


 


 8.2 g/dL


(12.0-15.5) 


 





 


Hematocrit


 


 25.0 %


(36.0-47.0) 


 





 


Mean Corpuscular Volume  86 fL ()   


 


Mean Corpuscular Hemoglobin  28 pg (25-35)   


 


Mean Corpuscular Hemoglobin


Concent 


 33 g/dL


(31-37) 


 





 


Red Cell Distribution Width


 


 15.6 %


(11.5-14.5) 


 





 


Platelet Count


 


 532 x10^3/uL


(140-400) 


 





 


Neutrophils (%) (Auto)  85 % (31-73)   


 


Lymphocytes (%) (Auto)  6 % (24-48)   


 


Monocytes (%) (Auto)  5 % (0-9)   


 


Eosinophils (%) (Auto)  3 % (0-3)   


 


Basophils (%) (Auto)  1 % (0-3)   


 


Neutrophils # (Auto)


 


 15.7 x10^3/uL


(1.8-7.7) 


 





 


Lymphocytes # (Auto)


 


 1.2 x10^3/uL


(1.0-4.8) 


 





 


Monocytes # (Auto)


 


 0.9 x10^3/uL


(0.0-1.1) 


 





 


Eosinophils # (Auto)


 


 0.6 x10^3/uL


(0.0-0.7) 


 





 


Basophils # (Auto)


 


 0.1 x10^3/uL


(0.0-0.2) 


 











Laboratory Tests








Test


 7/22/20


17:43 7/23/20


01:25 7/23/20


06:00 7/23/20


06:04


 


Glucose (Fingerstick)


 134 mg/dL


(70-99) 132 mg/dL


(70-99) 


 117 mg/dL


(70-99)


 


Sodium Level


 


 


 134 mmol/L


(136-145) 





 


Potassium Level


 


 


 4.4 mmol/L


(3.5-5.1) 





 


Chloride Level


 


 


 102 mmol/L


() 





 


Carbon Dioxide Level


 


 


 28 mmol/L


(21-32) 





 


Anion Gap   4 (6-14)  


 


Blood Urea Nitrogen


 


 


 14 mg/dL


(7-20) 





 


Creatinine


 


 


 0.5 mg/dL


(0.6-1.0) 





 


Estimated GFR


(Cockcroft-Gault) 


 


 131.1 


 





 


Glucose Level


 


 


 121 mg/dL


(70-99) 





 


Calcium Level


 


 


 9.7 mg/dL


(8.5-10.1) 





 


Phosphorus Level


 


 


 4.1 mg/dL


(2.6-4.7) 





 


Magnesium Level


 


 


 1.9 mg/dL


(1.8-2.4) 





 


Triglycerides Level


 


 


 181 mg/dL


(0-150) 





 


Test


 7/23/20


09:00 7/23/20


12:26 


 





 


White Blood Count


 18.4 x10^3/uL


(4.0-11.0) 


 


 





 


Red Blood Count


 2.92 x10^6/uL


(3.50-5.40) 


 


 





 


Hemoglobin


 8.2 g/dL


(12.0-15.5) 


 


 





 


Hematocrit


 25.0 %


(36.0-47.0) 


 


 





 


Mean Corpuscular Volume 86 fL ()    


 


Mean Corpuscular Hemoglobin 28 pg (25-35)    


 


Mean Corpuscular Hemoglobin


Concent 33 g/dL


(31-37) 


 


 





 


Red Cell Distribution Width


 15.6 %


(11.5-14.5) 


 


 





 


Platelet Count


 532 x10^3/uL


(140-400) 


 


 





 


Neutrophils (%) (Auto) 85 % (31-73)    


 


Lymphocytes (%) (Auto) 6 % (24-48)    


 


Monocytes (%) (Auto) 5 % (0-9)    


 


Eosinophils (%) (Auto) 3 % (0-3)    


 


Basophils (%) (Auto) 1 % (0-3)    


 


Neutrophils # (Auto)


 15.7 x10^3/uL


(1.8-7.7) 


 


 





 


Lymphocytes # (Auto)


 1.2 x10^3/uL


(1.0-4.8) 


 


 





 


Monocytes # (Auto)


 0.9 x10^3/uL


(0.0-1.1) 


 


 





 


Eosinophils # (Auto)


 0.6 x10^3/uL


(0.0-0.7) 


 


 





 


Basophils # (Auto)


 0.1 x10^3/uL


(0.0-0.2) 


 


 





 


Glucose (Fingerstick)


 


 143 mg/dL


(70-99) 


 











Problem List


Problems


Medical Problems:


(1) Acute pancreatitis


Status: Acute  





(2) Cholelithiasis


Status: Acute  








Assessment/Plan


supportive care





Justicifation of Admission Dx:


Justifications for Admission:


Justification of Admission Dx:  Yes











SAURAV NASH APRN            Jul 23, 2020 14:13

## 2020-07-23 NOTE — PDOC
Objective:


Objective:


Tmax 101


Vital Signs:





                                   Vital Signs








  Date Time  Temp Pulse Resp B/P (MAP) Pulse Ox O2 Delivery O2 Flow Rate FiO2


 


7/23/20 08:43     100 Nasal Cannula 2.0 


 


7/23/20 08:00 98.6 118 28 115/71 (86)    





 98.6       








Labs:





Laboratory Tests








Test


 7/22/20


13:32 7/22/20


17:43 7/23/20


01:25 7/23/20


06:00


 


Glucose (Fingerstick) 144 mg/dL  134 mg/dL  132 mg/dL  


 


Sodium Level    134 mmol/L 


 


Potassium Level    4.4 mmol/L 


 


Chloride Level    102 mmol/L 


 


Carbon Dioxide Level    28 mmol/L 


 


Anion Gap    4 


 


Blood Urea Nitrogen    14 mg/dL 


 


Creatinine    0.5 mg/dL 


 


Estimated GFR


(Cockcroft-Gault) 


 


 


 131.1 





 


Glucose Level    121 mg/dL 


 


Calcium Level    9.7 mg/dL 


 


Phosphorus Level    4.1 mg/dL 


 


Magnesium Level    1.9 mg/dL 


 


Triglycerides Level    181 mg/dL 


 


Test


 7/23/20


06:04 7/23/20


09:00 


 





 


Glucose (Fingerstick) 117 mg/dL    


 


White Blood Count  18.4 x10^3/uL   


 


Red Blood Count  2.92 x10^6/uL   


 


Hemoglobin  8.2 g/dL   


 


Hematocrit  25.0 %   


 


Mean Corpuscular Volume  86 fL   


 


Mean Corpuscular Hemoglobin  28 pg   


 


Mean Corpuscular Hemoglobin


Concent 


 33 g/dL 


 


 





 


Red Cell Distribution Width  15.6 %   


 


Platelet Count  532 x10^3/uL   


 


Neutrophils (%) (Auto)  85 %   


 


Lymphocytes (%) (Auto)  6 %   


 


Monocytes (%) (Auto)  5 %   


 


Eosinophils (%) (Auto)  3 %   


 


Basophils (%) (Auto)  1 %   


 


Neutrophils # (Auto)  15.7 x10^3/uL   


 


Lymphocytes # (Auto)  1.2 x10^3/uL   


 


Monocytes # (Auto)  0.9 x10^3/uL   


 


Eosinophils # (Auto)  0.6 x10^3/uL   


 


Basophils # (Auto)  0.1 x10^3/uL   








Imaging:


CT A/P 7/21


IMPRESSION: 


1. Colonic wall thickening with findings suggesting diarrhea. Correlate for 

colitis.


2. Extensive retroperitoneal fluid collections persist. Percutaneous drains 

remain within the collections in both paracolic gutters. These communicate with 

additional pelvic and peripancreatic collections.


3. Small to moderate right pleural effusion. No left pleural fluid is detectable

in this field-of-view with a drain in place. Bibasilar atelectasis or infiltra

te.





PE:





GEN: chronically ill, RT and nurse present


LUNGS: NC


HEART: tachycardic


ABD: many drains


NEURO/PSYCH: awake and alert, waves, shows me her nail polish





A/P:


Gallstone pancreatitis s/p necrosectomy





--


Continue support.





Justicifation of Admission Dx:


Justifications for Admission:


Justification of Admission Dx:  Yes











RAGHAVENDRA MURCIA         Jul 23, 2020 10:19

## 2020-07-23 NOTE — PDOC
Infectious Disease Note


Subjective:


Subjective


Pt resting quietly, arousable


Febrile again at 101


TPN





Vital Signs:


Vital Signs





Vital Signs








  Date Time  Temp Pulse Resp B/P (MAP) Pulse Ox O2 Delivery O2 Flow Rate FiO2


 


7/23/20 04:00 98.5 118 30 122/66 (84) 100 Nasal Cannula 2.0 





 98.5       











Physical Exam:


PHYSICAL EXAM


GENERAL:lying in bed 


HEENT: Pupils equal, oral cavity dry. NGT out


NECK:  Tracheostomy 


LUNGS: Diminished aeration bases,  CT on left 


HEART:  S1, S2, regular, tachy 110s 


ABDOMEN: Distended, bowel sounds hypoactive, soft, richardson x 2, 3 ROBERT drains, G-J 

tube


: [place] in place 


EXTREMITIES: Trace generalized edema, no cyanosis. MARTHA hose bilaterally, 


SKIN: warm touch. No signs of rash.  


NEURO: arousable


LUE-PICC without signs of complications





Medications:


Inpatient Meds:





Current Medications








 Medications


  (Trade)  Dose


 Ordered  Sig/Yee  Start Time


 Stop Time Status Last Admin


Dose Admin


 


 Acetaminophen


  (Tylenol Supp)  650 mg  PRN Q6HRS  PRN  3/24/20 10:30


    7/22/20 20:53


650 MG


 


 Acetaminophen


  (Tylenol)  650 mg  PRN Q6HRS  PRN  3/21/20 03:36


 5/13/20 10:25 DC 4/16/20 19:56


650 MG


 


 Acetylcysteine


  (Mucomyst 20%


 Resp Treatment)  600 mg  RTBID  6/27/20 12:00


    7/22/20 20:00


600 MG


 


 Albumin Human  500 ml @ 


 125 mls/hr  PRN Q1HR  PRN  6/30/20 15:45


     





 


 Albuterol Sulfate


  (Ventolin Neb


 Soln)  2.5 mg  1X  ONCE  3/17/20 22:30


 3/17/20 22:31 DC 3/18/20 00:56


2.5 MG


 


 Albuterol/


 Ipratropium


  (Duoneb)  3 ml  Q4HRS  6/13/20 08:00


    7/23/20 03:24


3 ML


 


 Alprazolam


  (Xanax)  0.5 mg  PRN TID  PRN  7/17/20 08:00


    7/22/20 18:35


0.5 MG


 


 Alteplase,


 Recombinant


  (Cathflo For


 Central Catheter


 Clearance)  1 mg  1X  ONCE  7/21/20 12:00


 7/21/20 12:01 DC 7/21/20 12:17


1 MG


 


 Alteplase,


 Recombinant 4 mg/


 Sodium Chloride  20 ml @ 20


 mls/hr  1X  ONCE  6/17/20 10:00


 6/17/20 10:59 DC 6/17/20 10:09


20 MLS/HR


 


 Amino Acids/


 Glycerin/


 Electrolytes  1,000 ml @ 


 75 mls/hr  [id number]  4/20/20 21:15


   UNV  





 


 Artificial Tears


  (Artificial


 Tears)  1 drop  PRN Q15MIN  PRN  4/29/20 05:30


    6/23/20 21:17


1 DROP


 


 Atenolol


  (Tenormin)  100 mg  DAILY  3/17/20 09:00


 3/16/20 20:08 DC  





 


 Atropine Sulfate


  (ATROPINE 0.5mg


 SYRINGE)  0.5 mg  PRN Q5MIN  PRN  4/2/20 08:15


     





 


 Barium Sulfate


  (Varibar Thin


 Liquid Apple)  148 gm  1X  ONCE  5/26/20 11:45


 5/26/20 11:49 DC  





 


 Benzocaine


  (Hurricaine One)  1 spray  1X  ONCE  3/20/20 14:30


 3/20/20 14:31 DC 3/20/20 16:38


1 SPRAY


 


 Bisacodyl


  (Dulcolax Supp)  10 mg  STK-MED ONCE  4/27/20 10:59


 4/27/20 10:59 DC  





 


 Bumetanide


  (Bumex)  2 mg  DAILY  5/8/20 10:00


 5/18/20 17:15 DC 5/18/20 08:07


2 MG


 


 Bupivacaine HCl/


 Epinephrine Bitart


  (Sensorcain-Epi


 0.5%-1:269384 Mpf)  30 ml  STK-MED ONCE  6/30/20 08:34


 6/30/20 08:35 DC  





 


 Calcium Carbonate/


 Glycine


  (Tums)  500 mg  PRN [id number]  PRN  3/18/20 17:45


 5/13/20 10:25 DC  





 


 Calcium Chloride


 1000 mg/Sodium


 Chloride  110 ml @ 


 220 mls/hr  1X  ONCE  3/17/20 22:30


 3/17/20 22:59 DC 3/17/20 22:11


220 MLS/HR


 


 Calcium Chloride


 3000 mg/Sodium


 Chloride  1,030 ml @ 


 50 mls/hr  [id number]  3/19/20 08:00


 3/21/20 15:23 DC 3/21/20 02:17


50 MLS/HR


 


 Calcium Gluconate


  (Calcium


 Gluconate)  2,000 mg  1X  ONCE  3/19/20 02:15


 3/19/20 02:16 DC 3/19/20 02:19


2,000 MG


 


 Calcium Gluconate


 1000 mg/Sodium


 Chloride  110 ml @ 


 220 mls/hr  1X  ONCE  3/18/20 03:30


 3/18/20 03:59 DC 3/18/20 03:21


220 MLS/HR


 


 Calcium Gluconate


 2000 mg/Sodium


 Chloride  120 ml @ 


 220 mls/hr  1X  ONCE  3/18/20 07:30


 3/18/20 08:02 DC 3/18/20 09:05


220 MLS/HR


 


 Cefepime HCl


  (Maxipime)  2 gm  Q12HR  7/22/20 09:00


    7/22/20 20:53


2 GM


 


 Ceftazidime/


 Avibactam 2.5 gm/


 Sodium Chloride  100 ml @ 


 50 mls/hr  Q8HRS  7/21/20 14:00


 7/22/20 07:48 DC 7/22/20 05:32


50 MLS/HR


 


 Cellulose


  (Surgicel


 Fibrillar 1x2)  1 each  STK-MED ONCE  4/6/20 11:00


 4/6/20 11:01 DC  





 


 Cellulose


  (Surgicel


 Hemostat 2x14)  1 each  STK-MED ONCE  4/27/20 10:58


 4/27/20 10:59 DC  





 


 Cellulose


  (Surgicel


 Hemostat 4x8)  1 each  STK-MED ONCE  4/27/20 10:58


 4/27/20 10:59 DC  





 


 Chlorhexidine


 Gluconate


  (Peridex)  15 ml  BID  6/13/20 09:00


 6/13/20 07:58 DC  





 


 Ciprofloxacin/


 Dextrose  200 ml @ 


 200 mls/hr  Q12HR  7/12/20 10:00


 7/21/20 08:20 DC 7/20/20 21:02


200 MLS/HR


 


 Cyclobenzaprine


 HCl


  (Flexeril)  10 mg  PRN Q6HRS  PRN  4/30/20 10:45


    7/10/20 19:12


10 MG


 


 Daptomycin 410 mg/


 Sodium Chloride  50 ml @ 


 100 mls/hr  Q24H  6/7/20 14:00


 6/10/20 08:30 DC 6/9/20 13:33


100 MLS/HR


 


 Daptomycin 430 mg/


 Sodium Chloride  50 ml @ 


 100 mls/hr  Q24H  4/25/20 13:00


 4/30/20 20:58 DC 4/30/20 13:00


100 MLS/HR


 


 Daptomycin 450 mg/


 Sodium Chloride  50 ml @ 


 100 mls/hr  Q24H  5/17/20 09:00


 5/21/20 08:30 DC 5/20/20 09:25


100 MLS/HR


 


 Daptomycin 485 mg/


 Sodium Chloride  50 ml @ 


 100 mls/hr  Q24H  5/4/20 11:00


 5/12/20 07:44 DC 5/11/20 13:10


100 MLS/HR


 


 Daptomycin 500 mg/


 Sodium Chloride  50 ml @ 


 100 mls/hr  Q24H  7/15/20 09:00


    7/22/20 09:04


100 MLS/HR


 


 Desflurane


  (Suprane)  90 ml  STK-MED ONCE  6/30/20 10:18


 6/30/20 10:19 DC  





 


 Dexamethasone


 Sodium Phosphate


  (Decadron)  4 mg  STK-MED ONCE  4/27/20 10:56


 4/27/20 10:57 DC  





 


 Dexmedetomidine


 HCl 400 mcg/


 Sodium Chloride  100 ml @ 0


 mls/hr  CONT  PRN  4/2/20 08:15


 5/30/20 18:31 DC 5/30/20 12:57


8 MLS/HR


 


 Dextrose


  (Dextrose


 50%-Water Syringe)  12.5 gm  PRN Q15MIN  PRN  3/16/20 09:30


     





 


 Digoxin


  (Lanoxin)  125 mcg  1X  ONCE  3/19/20 18:00


 3/19/20 18:01 DC 3/19/20 17:10


125 MCG


 


 Diphenhydramine


 HCl


  (Benadryl)  25 mg  1X  ONCE  7/16/20 19:00


 7/16/20 19:01 DC 7/16/20 18:56


25 MG


 


 Duloxetine HCl


  (Cymbalta)  30 mg  DAILY  5/10/20 14:00


 5/13/20 10:25 DC 5/11/20 09:48


30 MG


 


 Enoxaparin Sodium


  (Lovenox 100mg


 Syringe)  100 mg  Q12HR  4/21/20 21:00


   UNV  





 


 Enoxaparin Sodium


  (Lovenox 40mg


 Syringe)  40 mg  Q24H  7/1/20 08:00


    7/22/20 09:02


40 MG


 


 Ephedrine Sulfate


  (ePHEDrine PF IN


 SALINE SYRINGE)  50 mg  STK-MED ONCE  6/30/20 14:45


 6/30/20 14:45 DC  





 


 Etomidate


  (Amidate)  8 mg  1X  ONCE  3/23/20 08:30


 3/23/20 08:31 DC 3/23/20 08:33


8 MG


 


 Fentanyl


  (Duragesic 12mcg/


 Hr Patch)  1 patch  Q3DAYS  7/10/20 09:00


    7/22/20 09:00


1 PATCH


 


 Fentanyl


  (Duragesic 50mcg/


 Hr Patch)  1 patch  Q72H  6/4/20 21:00


 6/13/20 12:00 DC 6/4/20 21:22


1 PATCH


 


 Fentanyl Citrate  30 ml @ 0


 mls/hr  CONT  PRN  7/9/20 17:30


     





 


 Fentanyl Citrate


  (Fentanyl 2ml


 Vial)  100 mcg  STK-MED ONCE  6/30/20 07:44


 6/30/20 07:44 DC  





 


 Fentanyl Citrate


  (Fentanyl 5ml


 Vial)  250 mcg  1X  ONCE  5/8/20 09:15


 5/8/20 09:16 DC 5/8/20 09:30


50 MCG


 


 Flumazenil


  (Romazicon)  0.5 mg  STK-MED ONCE  6/7/20 14:48


 6/7/20 14:48 DC  





 


 Fluoxetine HCl


  (PROzac)  20 mg  QHS  6/4/20 21:00


    7/22/20 20:53


20 MG


 


 Furosemide


  (Lasix)  40 mg  1X  ONCE  6/24/20 15:30


 6/24/20 15:33 DC 6/24/20 16:27


40 MG


 


 Haloperidol


 Lactate


  (Haldol Inj)  3 mg  1X  ONCE  5/4/20 14:30


 5/4/20 14:31 DC 5/4/20 14:37


3 MG


 


 Heparin Sodium


  (Porcine)


  (Hep Lock Adult)  500 unit  STK-MED ONCE  4/7/20 09:29


 4/7/20 09:30 DC  





 


 Heparin Sodium


  (Porcine)


  (Heparin Sodium)  5,000 unit  Q12HR  4/27/20 21:00


 5/7/20 09:59 DC 5/6/20 20:57


5,000 UNIT


 


 Heparin Sodium


  (Porcine) 1000


 unit/Sodium


 Chloride  1,001 ml @ 


 1,001 mls/hr  1X  ONCE  6/30/20 06:00


 6/30/20 06:59 DC  





 


 Hydromorphone HCl


  (Dilaudid


 Standard PCA)  12 mg  STK-MED ONCE  5/1/20 15:50


 5/12/20 11:24 DC  





 


 Hydromorphone HCl


  (Dilaudid)  1 mg  PRN Q4HRS  PRN  5/4/20 19:00


 5/18/20 17:10 DC 5/18/20 06:25


1 MG


 


 Info


  (CONTRAST GIVEN


 -- Rx MONITORING)  1 each  PRN DAILY  PRN  7/21/20 11:45


 7/23/20 11:44   





 


 Info


  (Icu Electrolyte


 Protocol)  1 ea  CONT PRN  PRN  3/29/20 13:15


     





 


 Info


  (PHARMACY


 MONITORING -- do


 not chart)  1 each  PRN DAILY  PRN  4/24/20 15:45


 5/26/20 14:14 DC  





 


 Info


  (Tpn Per


 Pharmacy)  1 each  PRN DAILY  PRN  3/18/20 12:30


   UNV  





 


 Insulin Human


 Lispro


  (HumaLOG)  0-9 UNITS  Q6HRS  3/16/20 09:30


    7/20/20 14:42


4 UNITS


 


 Insulin Human


 Regular


  (HumuLIN R VIAL)  5 unit  1X  ONCE  3/17/20 22:30


 3/17/20 22:31 DC 3/17/20 22:14


5 UNIT


 


 Iohexol


  (Omnipaque 240


 Mg/ml)  30 ml  1X  ONCE  3/30/20 11:30


 3/30/20 11:33 DC 3/30/20 11:30


30 ML


 


 Iohexol


  (Omnipaque 300


 Mg/ml)  75 ml  1X  ONCE  7/21/20 11:30


 7/21/20 11:31 DC 7/21/20 11:30


75 ML


 


 Iohexol


  (Omnipaque 350


 Mg/ml)  90 ml  1X  ONCE  3/16/20 03:30


 3/16/20 03:31 DC 3/16/20 03:25


90 ML


 


 Ketorolac


 Tromethamine


  (Toradol 30mg


 Vial)  30 mg  1X  ONCE  3/16/20 03:00


 3/16/20 03:01 DC 3/16/20 02:54


30 MG


 


 Lidocaine HCl


  (Buffered


 Lidocaine 1%)  3 ml  1X  ONCE  6/15/20 13:00


 6/15/20 13:01 DC 6/15/20 13:11


12 ML


 


 Lidocaine HCl


  (Glydo


  (Lidocaine) Jelly)  1 thomas  1X  ONCE  3/20/20 14:30


 3/20/20 14:31 DC 3/20/20 16:38


1 THOMAS


 


 Lidocaine HCl


  (Lidocaine 1%


 20ml Vial)  20 ml  1X  ONCE  6/7/20 15:00


 6/7/20 15:01 DC 6/7/20 15:30


20 ML


 


 Lidocaine HCl


  (Lidocaine Pf 2%


 Vial)  5 ml  STK-MED ONCE  6/30/20 07:44


 6/30/20 07:44 DC  





 


 Lidocaine HCl


  (Xylocaine-Mpf


 1% 2ml Vial)  2 ml  PRN 1X  PRN  4/27/20 07:00


 4/28/20 06:59 DC  





 


 Linezolid/Dextrose  300 ml @ 


 300 mls/hr  Q12HR  5/17/20 09:00


 5/20/20 08:11 DC 5/19/20 21:08


300 MLS/HR


 


 Lorazepam


  (Ativan Inj)  0.25 mg  PRN Q4HRS  PRN  6/3/20 07:30


    7/20/20 03:28


0.25 MG


 


 Magnesium Sulfate  50 ml @ 25


 mls/hr  1X  ONCE  6/30/20 16:30


 6/30/20 18:29 DC 6/30/20 17:02


25 MLS/HR


 


 Meropenem 1 gm/


 Sodium Chloride  100 ml @ 


 200 mls/hr  Q12HR  6/7/20 21:00


 6/25/20 08:56 DC 6/25/20 08:27


200 MLS/HR


 


 Meropenem 500 mg/


 Sodium Chloride  50 ml @ 


 100 mls/hr  Q6HRS  6/28/20 18:00


 7/21/20 08:23 DC 7/21/20 06:15


100 MLS/HR


 


 Methylprednisolone


 Sodium Succinate


  (SOLU-Medrol


 125MG VIAL)  125 mg  1X  ONCE  6/13/20 06:15


 6/13/20 06:16 DC 6/13/20 06:26


125 MG


 


 Metoclopramide HCl


  (Reglan Vial)  10 mg  PRN Q3HRS  PRN  5/9/20 16:45


    5/14/20 04:25


10 MG


 


 Metoprolol


 Tartrate


  (Lopressor Vial)  5 mg  PRN Q6HRS  PRN  6/10/20 09:00


    7/22/20 18:35


5 MG


 


 Metronidazole  100 ml @ 


 100 mls/hr  Q8HRS  4/14/20 10:00


 4/21/20 08:10 DC 4/21/20 06:04


100 MLS/HR


 


 Micafungin Sodium


 100 mg/Dextrose  100 ml @ 


 100 mls/hr  Q24H  6/30/20 08:30


    7/22/20 09:04


100 MLS/HR


 


 Midazolam HCl


  (Versed)  2 mg  1X  ONCE  6/7/20 15:00


 6/7/20 15:01 DC 6/7/20 15:28


1 MG


 


 Midazolam HCl 100


 mg/Sodium Chloride  100 ml @ 1


 mls/hr  CONT  PRN  6/30/20 14:45


    7/3/20 18:48


10 MLS/HR


 


 Midazolam HCl 50


 mg/Sodium Chloride  50 ml @ 0


 mls/hr  CONT  PRN  3/23/20 08:15


 3/28/20 15:59 DC 3/26/20 22:39


7 MLS/HR


 


 Morphine Sulfate


  (Morphine


 Sulfate)  1 mg  PRN Q1HR  PRN  6/30/20 14:45


     





 


 Multi-Ingred


 Cream/Lotion/Oil/


 Oint


  (Artificial


 Tears Eye


 Ointment)  1 thomas  PRN Q1HR  PRN  3/25/20 17:30


 6/3/20 14:39 DC 4/13/20 08:19


1 THOMAS


 


 Naloxone HCl


  (Narcan)  0.4 mg  PRN Q2MIN  PRN  6/30/20 14:45


     





 


 Norepinephrine


 Bitartrate 8 mg/


 Dextrose  258 ml @ 


 13.332 mls/


 hr  CONT  PRN  6/7/20 06:30


    7/2/20 09:09


1.6 MLS/HR


 


 Ondansetron HCl


  (Zofran)  4 mg  STK-MED ONCE  6/30/20 13:33


 6/30/20 13:33 DC  





 


 Pantoprazole


 Sodium


  (PROTONIX VIAL


 for IV PUSH)  40 mg  DAILYAC  3/16/20 11:30


    7/22/20 09:01


40 MG


 


 Phenylephrine HCl


  (Ken-Synephrine


 Inj)  10 mg  STK-MED ONCE  6/30/20 13:33


 6/30/20 13:33 DC  





 


 Phenylephrine HCl


  (PHENYLEPHRINE


 in 0.9% NACL PF)  1 mg  STK-MED ONCE  6/30/20 14:44


 6/30/20 14:45 DC  





 


 Piperacillin Sod/


 Tazobactam Sod


 3.375 gm/Sodium


 Chloride  50 ml @ 


 100 mls/hr  Q6HRS  5/27/20 12:00


 6/4/20 07:26 DC 6/4/20 06:10


100 MLS/HR


 


 Piperacillin Sod/


 Tazobactam Sod


 4.5 gm/Sodium


 Chloride  100 ml @ 


 200 mls/hr  1X  ONCE  3/16/20 06:00


 3/16/20 06:29 DC 3/16/20 05:44


200 MLS/HR


 


 Potassium


 Chloride 110 meq/


 Magnesium Sulfate


 20 meq/


 Multivitamins 10


 ml/Chromium/


 Copper/Manganese/


 Seleni/Zn 1 ml/


 Insulin Human


 Regular 15 unit/


 Total Parenteral


 Nutrition/Amino


 Acids/Dextrose/


 Fat Emulsion


 Intravenous  1,800 ml @ 


 75 mls/hr  TPN  CONT  5/24/20 22:00


 5/25/20 21:59 DC 5/24/20 22:48


75 MLS/HR


 


 Potassium


 Chloride 15 meq/


 Bicarbonate


 Dialysis Soln w/


 out KCl  5,007.5 ml


  @ 1,000 mls/


 hr  Q5H1M  3/29/20 20:00


 4/2/20 13:08 DC 4/1/20 18:14


1,000 MLS/HR


 


 Potassium


 Chloride 20 meq/


 Bicarbonate


 Dialysis Soln w/


 out KCl  5,010 ml @ 


 1,000 mls/hr  Q5H1M  3/25/20 16:00


 3/29/20 19:59 DC 3/29/20 14:54


1,000 MLS/HR


 


 Potassium


 Chloride 40 meq/


 Potassium Acetate


 60 meq/Magnesium


 Sulfate 10 meq/


 Multivitamins 10


 ml/Chromium/


 Copper/Manganese/


 Seleni/Zn 1 ml/


 Insulin Human


 Regular 20 unit/


 Total Parenteral


 Nutrition/Amino


 Acids/Dextrose/


 Fat Emulsion


 Intravenous  1,800 ml @ 


 75 mls/hr  TPN  CONT  6/4/20 22:00


 6/5/20 21:59 DC 6/5/20 00:03


75 MLS/HR


 


 Potassium


 Chloride 70 meq/


 Magnesium Sulfate


 20 meq/


 Multivitamins 10


 ml/Chromium/


 Copper/Manganese/


 Seleni/Zn 1 ml/


 Insulin Human


 Regular 15 unit/


 Total Parenteral


 Nutrition/Amino


 Acids/Dextrose/


 Fat Emulsion


 Intravenous  1,800 ml @ 


 75 mls/hr  TPN  CONT  5/29/20 22:00


 5/30/20 21:59 DC 5/29/20 23:13


75 MLS/HR


 


 Potassium


 Chloride 75 meq/


 Magnesium Sulfate


 15 meq/


 Multivitamins 10


 ml/Chromium/


 Copper/Manganese/


 Seleni/Zn 0.5 ml/


 Insulin Human


 Regular 15 unit/


 Total Parenteral


 Nutrition/Amino


 Acids/Dextrose/


 Fat Emulsion


 Intravenous  1,920 ml @ 


 80 mls/hr  TPN  CONT  5/9/20 22:00


 5/10/20 21:59 DC 5/9/20 22:41


80 MLS/HR


 


 Potassium


 Chloride 75 meq/


 Magnesium Sulfate


 15 meq/Calcium


 Gluconate 8 meq/


 Multivitamins 10


 ml/Chromium/


 Copper/Manganese/


 Seleni/Zn 0.5 ml/


 Insulin Human


 Regular 15 unit/


 Total Parenteral


 Nutrition/Amino


 Acids/Dextrose/


 Fat Emulsion


 Intravenous  1,920 ml @ 


 80 mls/hr  TPN  CONT  5/7/20 22:00


 5/8/20 21:59 DC 5/7/20 22:28


80 MLS/HR


 


 Potassium


 Chloride 75 meq/


 Magnesium Sulfate


 15 meq/Calcium


 Gluconate 8 meq/


 Multivitamins 10


 ml/Chromium/


 Copper/Manganese/


 Seleni/Zn 0.5 ml/


 Insulin Human


 Regular 20 unit/


 Total Parenteral


 Nutrition/Amino


 Acids/Dextrose/


 Fat Emulsion


 Intravenous  1,920 ml @ 


 80 mls/hr  TPN  CONT  5/6/20 22:00


 5/7/20 21:59 DC 5/6/20 22:00


80 MLS/HR


 


 Potassium


 Chloride 75 meq/


 Magnesium Sulfate


 15 meq/Calcium


 Gluconate 8 meq/


 Multivitamins 10


 ml/Chromium/


 Copper/Manganese/


 Seleni/Zn 0.5 ml/


 Insulin Human


 Regular 25 unit/


 Total Parenteral


 Nutrition/Amino


 Acids/Dextrose/


 Fat Emulsion


 Intravenous  1,920 ml @ 


 80 mls/hr  TPN  CONT  5/4/20 22:00


 5/5/20 21:59 DC 5/4/20 23:08


80 MLS/HR


 


 Potassium


 Chloride 75 meq/


 Magnesium Sulfate


 20 meq/Calcium


 Gluconate 10 meq/


 Multivitamins 10


 ml/Chromium/


 Copper/Manganese/


 Seleni/Zn 0.5 ml/


 Insulin Human


 Regular 25 unit/


 Total Parenteral


 Nutrition/Amino


 Acids/Dextrose/


 Fat Emulsion


 Intravenous  1,920 ml @ 


 80 mls/hr  TPN  CONT  5/3/20 22:00


 5/4/20 21:59 DC 5/3/20 22:04


80 MLS/HR


 


 Potassium


 Chloride 75 meq/


 Magnesium Sulfate


 20 meq/Calcium


 Gluconate 10 meq/


 Multivitamins 10


 ml/Chromium/


 Copper/Manganese/


 Seleni/Zn 0.5 ml/


 Insulin Human


 Regular 30 unit/


 Total Parenteral


 Nutrition/Amino


 Acids/Dextrose/


 Fat Emulsion


 Intravenous  1,920 ml @ 


 80 mls/hr  TPN  CONT  5/2/20 22:00


 5/3/20 22:00 DC 5/2/20 21:51


80 MLS/HR


 


 Potassium


 Chloride 80 meq/


 Magnesium Sulfate


 20 meq/


 Multivitamins 10


 ml/Chromium/


 Copper/Manganese/


 Seleni/Zn 0.5 ml/


 Insulin Human


 Regular 15 unit/


 Total Parenteral


 Nutrition/Amino


 Acids/Dextrose/


 Fat Emulsion


 Intravenous  1,920 ml @ 


 80 mls/hr  TPN  CONT  5/12/20 22:00


 5/13/20 21:59 DC 5/12/20 21:40


80 MLS/HR


 


 Potassium


 Chloride 80 meq/


 Magnesium Sulfate


 20 meq/


 Multivitamins 10


 ml/Chromium/


 Copper/Manganese/


 Seleni/Zn 1 ml/


 Insulin Human


 Regular 15 unit/


 Total Parenteral


 Nutrition/Amino


 Acids/Dextrose/


 Fat Emulsion


 Intravenous  1,800 ml @ 


 75 mls/hr  TPN  CONT  5/31/20 22:00


 6/1/20 21:59 DC 5/31/20 21:54


75 MLS/HR


 


 Potassium


 Chloride 90 meq/


 Magnesium Sulfate


 20 meq/


 Multivitamins 10


 ml/Chromium/


 Copper/Manganese/


 Seleni/Zn 1 ml/


 Insulin Human


 Regular 15 unit/


 Total Parenteral


 Nutrition/Amino


 Acids/Dextrose/


 Fat Emulsion


 Intravenous  1,800 ml @ 


 75 mls/hr  TPN  CONT  5/20/20 22:00


 5/21/20 21:59 DC 5/20/20 22:28


75 MLS/HR


 


 Potassium


 Chloride 90 meq/


 Magnesium Sulfate


 20 meq/


 Multivitamins 10


 ml/Chromium/


 Copper/Manganese/


 Seleni/Zn 1 ml/


 Insulin Human


 Regular 20 unit/


 Total Parenteral


 Nutrition/Amino


 Acids/Dextrose/


 Fat Emulsion


 Intravenous  1,800 ml @ 


 75 mls/hr  TPN  CONT  6/3/20 22:00


 6/4/20 21:59 DC 6/3/20 23:13


75 MLS/HR


 


 Potassium


 Chloride/Water  100 ml @ 


 100 mls/hr  Q1H  6/17/20 08:00


 6/17/20 09:59 DC 6/17/20 09:12


100 MLS/HR


 


 Potassium


 Phosphate 20 mmol/


 Sodium Chloride  106.6667


 ml @ 


 51.667 m...  1X  ONCE  3/25/20 13:00


 3/25/20 15:03 DC 3/25/20 12:51


51.667 MLS/HR


 


 Potassium Acetate


 30 meq/Magnesium


 Sulfate 14 meq/


 Multivitamins 10


 ml/Chromium/


 Copper/Manganese/


 Seleni/Zn 1 ml/


 Insulin Human


 Regular 15 unit/


 Sodium Chloride


 20 meq/Potassium


 Chloride 30 meq/


 Total Parenteral


 Nutrition/Amino


 Acids/Dextrose/


 Fat Emulsion


 Intravenous  1,920 ml @ 


 80 mls/hr  TPN  CONT  6/23/20 22:00


 6/24/20 21:59 DC 6/23/20 21:46


80 MLS/HR


 


 Potassium Acetate


 30 meq/Magnesium


 Sulfate 20 meq/


 Calcium Gluconate


 10 meq/


 Multivitamins 10


 ml/Chromium/


 Copper/Manganese/


 Seleni/Zn 0.5 ml/


 Insulin Human


 Regular 30 unit/


 Potassium


 Chloride 30 meq/


 Total Parenteral


 Nutrition/Amino


 Acids/Dextrose/


 Fat Emulsion


 Intravenous  1,920 ml @ 


 80 mls/hr  TPN  CONT  5/1/20 22:00


 5/2/20 21:59 DC 5/1/20 22:34


80 MLS/HR


 


 Potassium Acetate


 40 meq/Magnesium


 Sulfate 10 meq/


 Multivitamins 10


 ml/Chromium/


 Copper/Manganese/


 Seleni/Zn 1 ml/


 Insulin Human


 Regular 20 unit/


 Total Parenteral


 Nutrition/Amino


 Acids/Dextrose/


 Fat Emulsion


 Intravenous  1,920 ml @ 


 80 mls/hr  TPN  CONT  6/16/20 22:00


 6/17/20 21:59 DC 6/16/20 21:32


80 MLS/HR


 


 Potassium Acetate


 40 meq/Magnesium


 Sulfate 5 meq/


 Multivitamins 10


 ml/Chromium/


 Copper/Manganese/


 Seleni/Zn 1 ml/


 Insulin Human


 Regular 30 unit/


 Total Parenteral


 Nutrition/Amino


 Acids/Dextrose/


 Fat Emulsion


 Intravenous  1,920 ml @ 


 80 mls/hr  TPN  CONT  6/15/20 22:00


 6/16/20 19:34 DC 6/15/20 21:54


80 MLS/HR


 


 Potassium Acetate


 55 meq/Magnesium


 Sulfate 20 meq/


 Calcium Gluconate


 10 meq/


 Multivitamins 10


 ml/Chromium/


 Copper/Manganese/


 Seleni/Zn 0.5 ml/


 Insulin Human


 Regular 30 unit/


 Total Parenteral


 Nutrition/Amino


 Acids/Dextrose/


 Fat Emulsion


 Intravenous  1,920 ml @ 


 80 mls/hr  TPN  CONT  4/30/20 22:00


 5/1/20 21:59 DC 5/1/20 01:00


80 MLS/HR


 


 Potassium Acetate


 55 meq/Magnesium


 Sulfate 20 meq/


 Calcium Gluconate


 10 meq/


 Multivitamins 10


 ml/Chromium/


 Copper/Manganese/


 Seleni/Zn 0.5 ml/


 Insulin Human


 Regular 35 unit/


 Total Parenteral


 Nutrition/Amino


 Acids/Dextrose/


 Fat Emulsion


 Intravenous  1,920 ml @ 


 80 mls/hr  TPN  CONT  4/28/20 22:00


 4/29/20 21:59 DC 4/28/20 22:02


80 MLS/HR


 


 Potassium Acetate


 60 meq/Magnesium


 Sulfate 10 meq/


 Multivitamins 10


 ml/Chromium/


 Copper/Manganese/


 Seleni/Zn 1 ml/


 Insulin Human


 Regular 20 unit/


 Total Parenteral


 Nutrition/Amino


 Acids/Dextrose/


 Fat Emulsion


 Intravenous  1,920 ml @ 


 80 mls/hr  TPN  CONT  6/17/20 22:00


 6/18/20 21:59 DC 6/17/20 21:55


80 MLS/HR


 


 Potassium Acetate


 60 meq/Magnesium


 Sulfate 14 meq/


 Multivitamins 10


 ml/Chromium/


 Copper/Manganese/


 Seleni/Zn 1 ml/


 Insulin Human


 Regular 15 unit/


 Sodium Chloride


 20 meq/Total


 Parenteral


 Nutrition/Amino


 Acids/Dextrose/


 Fat Emulsion


 Intravenous  1,920 ml @ 


 80 mls/hr  TPN  CONT  6/22/20 22:00


 6/23/20 21:59 DC 6/22/20 21:54


80 MLS/HR


 


 Potassium Acetate


 60 meq/Magnesium


 Sulfate 14 meq/


 Multivitamins 10


 ml/Chromium/


 Copper/Manganese/


 Seleni/Zn 1 ml/


 Insulin Human


 Regular 15 unit/


 Total Parenteral


 Nutrition/Amino


 Acids/Dextrose/


 Fat Emulsion


 Intravenous  1,920 ml @ 


 80 mls/hr  TPN  CONT  6/21/20 22:00


 6/22/20 21:59 DC 6/21/20 22:22


80 MLS/HR


 


 Potassium Acetate


 60 meq/Magnesium


 Sulfate 14 meq/


 Multivitamins 10


 ml/Chromium/


 Copper/Manganese/


 Seleni/Zn 1 ml/


 Insulin Human


 Regular 20 unit/


 Total Parenteral


 Nutrition/Amino


 Acids/Dextrose/


 Fat Emulsion


 Intravenous  1,920 ml @ 


 80 mls/hr  TPN  CONT  6/18/20 22:00


 6/19/20 21:59 DC 6/18/20 22:26


80 MLS/HR


 


 Potassium Acetate


 60 meq/Magnesium


 Sulfate 5 meq/


 Multivitamins 10


 ml/Chromium/


 Copper/Manganese/


 Seleni/Zn 1 ml/


 Insulin Human


 Regular 30 unit/


 Total Parenteral


 Nutrition/Amino


 Acids/Dextrose/


 Fat Emulsion


 Intravenous  1,920 ml @ 


 80 mls/hr  TPN  CONT  6/6/20 22:00


 6/7/20 21:59 DC 6/6/20 21:54


80 MLS/HR


 


 Potassium Acetate


 65 meq/Magnesium


 Sulfate 20 meq/


 Calcium Gluconate


 10 meq/


 Multivitamins 10


 ml/Chromium/


 Copper/Manganese/


 Seleni/Zn 0.5 ml/


 Insulin Human


 Regular 30 unit/


 Total Parenteral


 Nutrition/Amino


 Acids/Dextrose/


 Fat Emulsion


 Intravenous  1,920 ml @ 


 80 mls/hr  TPN  CONT  4/29/20 22:00


 4/30/20 21:59 DC 4/29/20 22:22


80 MLS/HR


 


 Potassium Acetate


 80 meq/Magnesium


 Sulfate 5 meq/


 Multivitamins 10


 ml/Chromium/


 Copper/Manganese/


 Seleni/Zn 1 ml/


 Insulin Human


 Regular 20 unit/


 Total Parenteral


 Nutrition/Amino


 Acids/Dextrose/


 Fat Emulsion


 Intravenous  1,920 ml @ 


 80 mls/hr  TPN  CONT  6/5/20 22:00


 6/6/20 21:59 DC 6/5/20 21:59


80 MLS/HR


 


 Prochlorperazine


 Edisylate


  (Compazine)  5 mg  PACU PRN  PRN  4/27/20 07:00


 4/28/20 06:59 DC  





 


 Propofol


  (Diprivan)  200 mg  STK-MED ONCE  6/30/20 07:44


 6/30/20 07:44 DC  





 


 Ringer's Solution  1,000 ml @ 


 30 mls/hr  Q24H  4/27/20 07:00


 4/27/20 18:59 DC  





 


 Rocuronium Bromide


  (Zemuron)  100 mg  STK-MED ONCE  6/30/20 07:44


 6/30/20 07:44 DC  





 


 Saliva Substitute


  (Biotene


 Moisturizing


 Mouth)  2 spray  PRN Q15MIN  PRN  5/21/20 11:00


     





 


 Sevoflurane


  (Ultane)  60 ml  STK-MED ONCE  4/27/20 12:26


 4/27/20 12:27 DC  





 


 Sodium


 Bicarbonate 150


 meq/Dextrose  1,150 ml @ 


 75 mls/hr  1X  ONCE  6/30/20 16:30


 7/1/20 07:49 DC 6/30/20 20:02


75 MLS/HR


 


 Sodium


 Bicarbonate 50


 meq/Sodium


 Chloride  1,050 ml @ 


 75 mls/hr  Q14H  3/18/20 07:30


 3/23/20 10:28 DC 3/22/20 21:10


75 MLS/HR


 


 Sodium Acetate 50


 meq/Potassium


 Acetate 55 meq/


 Magnesium Sulfate


 20 meq/Calcium


 Gluconate 10 meq/


 Multivitamins 10


 ml/Chromium/


 Copper/Manganese/


 Seleni/Zn 0.5 ml/


 Insulin Human


 Regular 35 unit/


 Total Parenteral


 Nutrition/Amino


 Acids/Dextrose/


 Fat Emulsion


 Intravenous  1,800 ml @ 


 75 mls/hr  TPN  CONT  4/25/20 22:00


 4/26/20 21:59 DC 4/25/20 22:03


75 MLS/HR


 


 Sodium Bicarbonate


  (Sodium Bicarb


 Adult 8.4% Syr)  100 meq  1X  ONCE  6/30/20 16:30


 6/30/20 16:31 DC 6/30/20 17:07


100 MEQ


 


 Sodium Chloride


  (Normal Saline


 Flush)  3 ml  QSHIFT  PRN  6/30/20 14:45


     





 


 Sodium Chloride


 80 meq/Potassium


 Chloride 30 meq/


 Potassium Acetate


 30 meq/Magnesium


 Sulfate 14 meq/


 Multivitamins 10


 ml/Chromium/


 Copper/Manganese/


 Seleni/Zn 1 ml/


 Insulin Human


 Regular 15 unit/


 Total Parenteral


 Nutrition/Amino


 Acids/Dextrose/


 Fat Emulsion


 Intravenous  1,920 ml @ 


 80 mls/hr  TPN  CONT  7/1/20 22:00


 7/2/20 21:59 DC 7/1/20 23:05


80 MLS/HR


 


 Sodium Chloride


 90 meq/Calcium


 Gluconate 10 meq/


 Multivitamins 10


 ml/Chromium/


 Copper/Manganese/


 Seleni/Zn 0.5 ml/


 Total Parenteral


 Nutrition/Amino


 Acids/Dextrose/


 Fat Emulsion


 Intravenous  1,512 ml @ 


 63 mls/hr  TPN  CONT  3/18/20 22:00


 3/19/20 21:59 DC 3/18/20 22:06


63 MLS/HR


 


 Sodium Chloride


 90 meq/Calcium


 Gluconate 10 meq/


 Multivitamins 10


 ml/Chromium/


 Copper/Manganese/


 Seleni/Zn 1 ml/


 Total Parenteral


 Nutrition/Amino


 Acids/Dextrose/


 Fat Emulsion


 Intravenous  55.005 ml


  @ 2.292


 mls/hr  TPN  CONT  3/18/20 22:00


 3/18/20 12:33 DC  





 


 Sodium Chloride


 90 meq/Magnesium


 Sulfate 10 meq/


 Calcium Gluconate


 20 meq/


 Multivitamins 10


 ml/Chromium/


 Copper/Manganese/


 Seleni/Zn 0.5 ml/


 Total Parenteral


 Nutrition/Amino


 Acids/Dextrose/


 Fat Emulsion


 Intravenous  1,512 ml @ 


 63 mls/hr  TPN  CONT  3/19/20 22:00


 3/20/20 21:59 DC 3/19/20 22:25


63 MLS/HR


 


 Sodium Chloride


 90 meq/Magnesium


 Sulfate 12 meq/


 Calcium Gluconate


 15 meq/


 Multivitamins 10


 ml/Chromium/


 Copper/Manganese/


 Seleni/Zn 0.5 ml/


 Insulin Human


 Regular 25 unit/


 Total Parenteral


 Nutrition/Amino


 Acids/Dextrose/


 Fat Emulsion


 Intravenous  1,400 ml @ 


 58.333 mls/


 hr  TPN  CONT  4/8/20 22:00


 4/9/20 21:59 DC 4/8/20 21:41


58.333 MLS/HR


 


 Sodium Chloride


 90 meq/Potassium


 Chloride 15 meq/


 Magnesium Sulfate


 12 meq/Calcium


 Gluconate 15 meq/


 Multivitamins 10


 ml/Chromium/


 Copper/Manganese/


 Seleni/Zn 0.5 ml/


 Insulin Human


 Regular 25 unit/


 Total Parenteral


 Nutrition/Amino


 Acids/Dextrose/


 Fat Emulsion


 Intravenous  1,400 ml @ 


 58.333 mls/


 hr  TPN  CONT  4/7/20 22:00


 4/8/20 21:59 DC 4/7/20 22:13


58.333 MLS/HR


 


 Sodium Chloride


 90 meq/Potassium


 Chloride 15 meq/


 Potassium


 Phosphate 10 mmol/


 Magnesium Sulfate


 8 meq/Calcium


 Gluconate 15 meq/


 Multivitamins 10


 ml/Chromium/


 Copper/Manganese/


 Seleni/Zn 0.5 ml/


 Insulin Human


 Regular 25 unit/


 Total Parenteral


 Nutrition/Amino


 Acids/Dextrose/


 Fat Emulsion


 Intravenous  1,400 ml @ 


 58.333 mls/


 hr  TPN  CONT  4/5/20 22:00


 4/6/20 21:59 DC 4/5/20 21:20


58.333 MLS/HR


 


 Sodium Chloride


 90 meq/Potassium


 Chloride 15 meq/


 Potassium


 Phosphate 10 mmol/


 Magnesium Sulfate


 10 meq/Calcium


 Gluconate 20 meq/


 Multivitamins 10


 ml/Chromium/


 Copper/Manganese/


 Seleni/Zn 0.5 ml/


 Total Parenteral


 Nutrition/Amino


 Acids/Dextrose/


 Fat Emulsion


 Intravenous  1,400 ml @ 


 58.333 mls/


 hr  TPN  CONT  3/23/20 22:00


 3/24/20 21:59 DC 3/23/20 21:42


58.333 MLS/HR


 


 Sodium Chloride


 90 meq/Potassium


 Chloride 15 meq/


 Potassium


 Phosphate 10 mmol/


 Magnesium Sulfate


 12 meq/Calcium


 Gluconate 15 meq/


 Multivitamins 10


 ml/Chromium/


 Copper/Manganese/


 Seleni/Zn 0.5 ml/


 Insulin Human


 Regular 25 unit/


 Total Parenteral


 Nutrition/Amino


 Acids/Dextrose/


 Fat Emulsion


 Intravenous  1,400 ml @ 


 58.333 mls/


 hr  TPN  CONT  4/6/20 22:00


 4/7/20 21:59 DC 4/6/20 22:24


58.333 MLS/HR


 


 Sodium Chloride


 90 meq/Potassium


 Chloride 15 meq/


 Potassium


 Phosphate 15 mmol/


 Magnesium Sulfate


 10 meq/Calcium


 Gluconate 15 meq/


 Multivitamins 10


 ml/Chromium/


 Copper/Manganese/


 Seleni/Zn 0.5 ml/


 Total Parenteral


 Nutrition/Amino


 Acids/Dextrose/


 Fat Emulsion


 Intravenous  1,400 ml @ 


 58.333 mls/


 hr  TPN  CONT  3/24/20 22:00


 3/25/20 21:59 DC 3/24/20 22:17


58.333 MLS/HR


 


 Sodium Chloride


 90 meq/Potassium


 Chloride 15 meq/


 Potassium


 Phosphate 15 mmol/


 Magnesium Sulfate


 10 meq/Calcium


 Gluconate 20 meq/


 Multivitamins 10


 ml/Chromium/


 Copper/Manganese/


 Seleni/Zn 0.5 ml/


 Total Parenteral


 Nutrition/Amino


 Acids/Dextrose/


 Fat Emulsion


 Intravenous  1,200 ml @ 


 50 mls/hr  TPN  CONT  3/22/20 22:00


 3/22/20 14:17 DC  





 


 Sodium Chloride


 90 meq/Potassium


 Chloride 15 meq/


 Potassium


 Phosphate 18 mmol/


 Magnesium Sulfate


 8 meq/Calcium


 Gluconate 15 meq/


 Multivitamins 10


 ml/Chromium/


 Copper/Manganese/


 Seleni/Zn 0.5 ml/


 Insulin Human


 Regular 10 unit/


 Total Parenteral


 Nutrition/Amino


 Acids/Dextrose/


 Fat Emulsion


 Intravenous  1,400 ml @ 


 58.333 mls/


 hr  TPN  CONT  3/27/20 22:00


 3/28/20 21:59 DC 3/27/20 21:43


58.333 MLS/HR


 


 Sodium Chloride


 90 meq/Potassium


 Chloride 15 meq/


 Potassium


 Phosphate 18 mmol/


 Magnesium Sulfate


 8 meq/Calcium


 Gluconate 15 meq/


 Multivitamins 10


 ml/Chromium/


 Copper/Manganese/


 Seleni/Zn 0.5 ml/


 Insulin Human


 Regular 15 unit/


 Total Parenteral


 Nutrition/Amino


 Acids/Dextrose/


 Fat Emulsion


 Intravenous  1,400 ml @ 


 58.333 mls/


 hr  TPN  CONT  3/30/20 22:00


 3/31/20 21:59 DC 3/30/20 21:47


58.333 MLS/HR


 


 Sodium Chloride


 90 meq/Potassium


 Chloride 15 meq/


 Potassium


 Phosphate 18 mmol/


 Magnesium Sulfate


 8 meq/Calcium


 Gluconate 15 meq/


 Multivitamins 10


 ml/Chromium/


 Copper/Manganese/


 Seleni/Zn 0.5 ml/


 Insulin Human


 Regular 20 unit/


 Total Parenteral


 Nutrition/Amino


 Acids/Dextrose/


 Fat Emulsion


 Intravenous  1,400 ml @ 


 58.333 mls/


 hr  TPN  CONT  4/2/20 22:00


 4/3/20 21:59 DC 4/2/20 22:45


58.333 MLS/HR


 


 Sodium Chloride


 90 meq/Potassium


 Chloride 15 meq/


 Potassium


 Phosphate 18 mmol/


 Magnesium Sulfate


 8 meq/Calcium


 Gluconate 15 meq/


 Multivitamins 10


 ml/Chromium/


 Copper/Manganese/


 Seleni/Zn 0.5 ml/


 Total Parenteral


 Nutrition/Amino


 Acids/Dextrose/


 Fat Emulsion


 Intravenous  1,400 ml @ 


 58.333 mls/


 hr  TPN  CONT  3/26/20 22:00


 3/27/20 21:59 DC 3/26/20 22:00


58.333 MLS/HR


 


 Sodium Chloride


 90 meq/Potassium


 Chloride 30 meq/


 Potassium Acetate


 30 meq/Magnesium


 Sulfate 15 meq/


 Multivitamins 10


 ml/Chromium/


 Copper/Manganese/


 Seleni/Zn 1 ml/


 Insulin Human


 Regular 15 unit/


 Total Parenteral


 Nutrition/Amino


 Acids/Dextrose/


 Fat Emulsion


 Intravenous  1,680 ml @ 


 70 mls/hr  TPN  CONT  7/16/20 22:00


 7/17/20 21:59 DC 7/16/20 22:06


70 MLS/HR


 


 Sodium Chloride


 90 meq/Potassium


 Phosphate 15 mmol/


 Magnesium Sulfate


 12 meq/Calcium


 Gluconate 15 meq/


 Multivitamins 10


 ml/Chromium/


 Copper/Manganese/


 Seleni/Zn 0.5 ml/


 Insulin Human


 Regular 30 unit/


 Total Parenteral


 Nutrition/Amino


 Acids/Dextrose/


 Fat Emulsion


 Intravenous  1,400 ml @ 


 58.333 mls/


 hr  TPN  CONT  4/10/20 22:00


 4/11/20 21:59 DC 4/10/20 21:49


58.333 MLS/HR


 


 Sodium Chloride


 90 meq/Potassium


 Phosphate 15 mmol/


 Magnesium Sulfate


 12 meq/Calcium


 Gluconate 15 meq/


 Multivitamins 10


 ml/Chromium/


 Copper/Manganese/


 Seleni/Zn 0.5 ml/


 Insulin Human


 Regular 40 unit/


 Total Parenteral


 Nutrition/Amino


 Acids/Dextrose/


 Fat Emulsion


 Intravenous  1,400 ml @ 


 58.333 mls/


 hr  TPN  CONT  4/11/20 22:00


 4/12/20 21:59 DC 4/11/20 21:21


58.333 MLS/HR


 


 Sodium Chloride


 90 meq/Potassium


 Phosphate 19 mmol/


 Magnesium Sulfate


 12 meq/Calcium


 Gluconate 15 meq/


 Multivitamins 10


 ml/Chromium/


 Copper/Manganese/


 Seleni/Zn 0.5 ml/


 Insulin Human


 Regular 40 unit/


 Total Parenteral


 Nutrition/Amino


 Acids/Dextrose/


 Fat Emulsion


 Intravenous  1,400 ml @ 


 58.333 mls/


 hr  TPN  CONT  4/12/20 22:00


 4/13/20 21:59 DC 4/12/20 21:54


58.333 MLS/HR


 


 Sodium Chloride


 90 meq/Potassium


 Phosphate 5 mmol/


 Magnesium Sulfate


 12 meq/Calcium


 Gluconate 15 meq/


 Multivitamins 10


 ml/Chromium/


 Copper/Manganese/


 Seleni/Zn 0.5 ml/


 Insulin Human


 Regular 30 unit/


 Total Parenteral


 Nutrition/Amino


 Acids/Dextrose/


 Fat Emulsion


 Intravenous  1,400 ml @ 


 58.333 mls/


 hr  TPN  CONT  4/9/20 22:00


 4/10/20 21:59 DC 4/9/20 22:08


58.333 MLS/HR


 


 Sodium Chloride


 100 meq/Potassium


 Chloride 30 meq/


 Potassium Acetate


 30 meq/Magnesium


 Sulfate 12 meq/


 Multivitamins 10


 ml/Chromium/


 Copper/Manganese/


 Seleni/Zn 1 ml/


 Insulin Human


 Regular 15 unit/


 Total Parenteral


 Nutrition/Amino


 Acids/Dextrose/


 Fat Emulsion


 Intravenous  1,680 ml @ 


 70 mls/hr  TPN  CONT  7/5/20 22:00


 7/6/20 21:59 DC 7/5/20 21:23


70 MLS/HR


 


 Sodium Chloride


 100 meq/Potassium


 Chloride 40 meq/


 Magnesium Sulfate


 15 meq/Calcium


 Gluconate 15 meq/


 Multivitamins 10


 ml/Chromium/


 Copper/Manganese/


 Seleni/Zn 0.5 ml/


 Insulin Human


 Regular 35 unit/


 Total Parenteral


 Nutrition/Amino


 Acids/Dextrose/


 Fat Emulsion


 Intravenous  1,400 ml @ 


 58.333 mls/


 hr  TPN  CONT  4/19/20 22:00


 4/20/20 21:59 DC 4/19/20 22:46


58.333 MLS/HR


 


 Sodium Chloride


 100 meq/Potassium


 Chloride 40 meq/


 Magnesium Sulfate


 20 meq/Calcium


 Gluconate 10 meq/


 Multivitamins 10


 ml/Chromium/


 Copper/Manganese/


 Seleni/Zn 0.5 ml/


 Insulin Human


 Regular 35 unit/


 Total Parenteral


 Nutrition/Amino


 Acids/Dextrose/


 Fat Emulsion


 Intravenous  1,400 ml @ 


 58.333 mls/


 hr  TPN  CONT  4/23/20 22:00


 4/24/20 21:59 DC 4/24/20 00:06


58.333 MLS/HR


 


 Sodium Chloride


 100 meq/Potassium


 Chloride 40 meq/


 Magnesium Sulfate


 20 meq/Calcium


 Gluconate 15 meq/


 Multivitamins 10


 ml/Chromium/


 Copper/Manganese/


 Seleni/Zn 0.5 ml/


 Insulin Human


 Regular 35 unit/


 Total Parenteral


 Nutrition/Amino


 Acids/Dextrose/


 Fat Emulsion


 Intravenous  1,400 ml @ 


 58.333 mls/


 hr  TPN  CONT  4/22/20 22:00


 4/23/20 21:59 DC 4/22/20 22:27


58.333 MLS/HR


 


 Sodium Chloride


 100 meq/Potassium


 Phosphate 10 mmol/


 Magnesium Sulfate


 12 meq/Calcium


 Gluconate 15 meq/


 Multivitamins 10


 ml/Chromium/


 Copper/Manganese/


 Seleni/Zn 0.5 ml/


 Insulin Human


 Regular 35 unit/


 Potassium


 Chloride 20 meq/


 Total Parenteral


 Nutrition/Amino


 Acids/Dextrose/


 Fat Emulsion


 Intravenous  1,400 ml @ 


 58.333 mls/


 hr  TPN  CONT  4/16/20 22:00


 4/17/20 21:59 DC 4/16/20 22:10


58.333 MLS/HR


 


 Sodium Chloride


 100 meq/Potassium


 Phosphate 19 mmol/


 Magnesium Sulfate


 12 meq/Calcium


 Gluconate 15 meq/


 Multivitamins 10


 ml/Chromium/


 Copper/Manganese/


 Seleni/Zn 0.5 ml/


 Insulin Human


 Regular 40 unit/


 Potassium


 Chloride 20 meq/


 Total Parenteral


 Nutrition/Amino


 Acids/Dextrose/


 Fat Emulsion


 Intravenous  1,400 ml @ 


 58.333 mls/


 hr  TPN  CONT  4/15/20 22:00


 4/16/20 21:59 DC 4/15/20 21:20


58.333 MLS/HR


 


 Sodium Chloride


 100 meq/Potassium


 Phosphate 5 mmol/


 Magnesium Sulfate


 12 meq/Calcium


 Gluconate 15 meq/


 Multivitamins 10


 ml/Chromium/


 Copper/Manganese/


 Seleni/Zn 0.5 ml/


 Insulin Human


 Regular 35 unit/


 Potassium


 Chloride 20 meq/


 Total Parenteral


 Nutrition/Amino


 Acids/Dextrose/


 Fat Emulsion


 Intravenous  1,400 ml @ 


 58.333 mls/


 hr  TPN  CONT  4/17/20 22:00


 4/18/20 21:59 DC 4/17/20 22:59


58.333 MLS/HR


 


 Sodium Chloride


 110 meq/Potassium


 Chloride 30 meq/


 Potassium Acetate


 30 meq/Magnesium


 Sulfate 15 meq/


 Multivitamins 10


 ml/Chromium/


 Copper/Manganese/


 Seleni/Zn 1 ml/


 Insulin Human


 Regular 15 unit/


 Total Parenteral


 Nutrition/Amino


 Acids/Dextrose/


 Fat Emulsion


 Intravenous  1,680 ml @ 


 70 mls/hr  TPN  CONT  7/18/20 22:00


 7/19/20 21:59 DC 7/18/20 22:01


70 MLS/HR


 


 Sodium Chloride


 110 meq/Sodium


 Phosphate 10 mmol/


 Potassium


 Chloride 30 meq/


 Potassium Acetate


 30 meq/Magnesium


 Sulfate 15 meq/


 Multivitamins 10


 ml/Chromium/


 Copper/Manganese/


 Seleni/Zn 1 ml/


 Insulin Human


 Regular 15 unit/


 Total Parenteral


 Nutrition/Amino


 Acids/Dextrose/


 Fat Emulsion


 Intravenous  1,680 ml @ 


 70 mls/hr  TPN  CONT  7/19/20 22:00


 7/20/20 21:59 DC 7/19/20 22:03


70 MLS/HR


 


 Sodium Chloride


 120 meq/Sodium


 Phosphate 10 mmol/


 Potassium


 Chloride 30 meq/


 Potassium Acetate


 30 meq/Magnesium


 Sulfate 15 meq/


 Multivitamins 10


 ml/Chromium/


 Copper/Manganese/


 Seleni/Zn 1 ml/


 Insulin Human


 Regular 15 unit/


 Total Parenteral


 Nutrition/Amino


 Acids/Dextrose/


 Fat Emulsion


 Intravenous  1,680 ml @ 


 70 mls/hr  TPN  CONT  7/22/20 22:00


 7/23/20 21:59  7/22/20 21:25


70 MLS/HR


 


 Succinylcholine


 Chloride


  (Anectine)  120 mg  1X  ONCE  3/23/20 08:30


 3/23/20 08:31 DC 3/23/20 08:34


120 MG


 


 Vancomycin HCl


  (Vanco Per


 Pharmacy)  1 each  PRN DAILY  PRN  7/12/20 09:15


 7/15/20 07:41 DC 7/14/20 02:46


1 EACH


 


 Vancomycin HCl


  (Vancomycin


 Random Level)  1 each  1X  ONCE  7/14/20 01:00


 7/14/20 01:01 DC 7/14/20 01:00


1 EACH


 


 Vancomycin HCl


  (Vancomycin


 Trough Level)  1 each  1X  ONCE  7/15/20 09:30


 7/15/20 09:31 Cancel  





 


 Vancomycin HCl


 1.5 gm/Sodium


 Chloride  500 ml @ 


 250 mls/hr  Q12H  7/14/20 10:00


 7/15/20 07:41 DC 7/14/20 22:07


250 MLS/HR


 


 Vancomycin HCl 2


 gm/Sodium Chloride  500 ml @ 


 250 mls/hr  1X  ONCE  7/12/20 10:00


 7/12/20 11:59 DC 7/12/20 10:34


250 MLS/HR


 


 Vasopressin


  (Vasostrict)  20 unit  STK-MED ONCE  6/30/20 12:23


 6/30/20 12:23 DC  





 


 Vasopressin 20


 unit/Dextrose  101 ml @ 


 12 mls/hr  CONT  PRN  6/30/20 15:30


    7/7/20 04:17


12 MLS/HR


 


 Vecuronium Bromide


  (Norcuron Bolus)  6 mg  PRN Q6HRS  PRN  5/7/20 19:15


 5/7/20 19:35 DC  














Labs:


Lab





Laboratory Tests








Test


 7/22/20


13:32 7/22/20


17:43 7/23/20


01:25 7/23/20


06:00


 


Glucose (Fingerstick)


 144 mg/dL


(70-99) 134 mg/dL


(70-99) 132 mg/dL


(70-99) 





 


Sodium Level


 


 


 


 134 mmol/L


(136-145)


 


Potassium Level


 


 


 


 4.4 mmol/L


(3.5-5.1)


 


Chloride Level


 


 


 


 102 mmol/L


()


 


Carbon Dioxide Level


 


 


 


 28 mmol/L


(21-32)


 


Anion Gap    4 (6-14) 


 


Blood Urea Nitrogen


 


 


 


 14 mg/dL


(7-20)


 


Creatinine


 


 


 


 0.5 mg/dL


(0.6-1.0)


 


Estimated GFR


(Cockcroft-Gault) 


 


 


 131.1 





 


Glucose Level


 


 


 


 121 mg/dL


(70-99)


 


Calcium Level


 


 


 


 9.7 mg/dL


(8.5-10.1)


 


Phosphorus Level


 


 


 


 4.1 mg/dL


(2.6-4.7)


 


Magnesium Level


 


 


 


 1.9 mg/dL


(1.8-2.4)


 


Triglycerides Level


 


 


 


 181 mg/dL


(0-150)


 


Test


 7/23/20


06:04 


 


 





 


Glucose (Fingerstick)


 117 mg/dL


(70-99) 


 


 











Micro


        NEG ANSON 56


        PSEUDOMONAS AERUGINOSA


        ANTIBIOTIC                        RESULT          INTERPRETATION


        AMIKACIN                          <=16                  S


        AZTREONAM                         >16                   R


        CEFTAZIDIME                       >16                   R


        CIPROFLOXACIN                     <=0.25                S


        CEFEPIME                          16                    I


        GENTAMICIN                        <=2                   S


        LEVOFLOXACIN                      <=0.5                 S














                               ** CONTINUED ON NEXT PAGE **





 

--------------------------------------------------------------------------------


------------





RUN DATE: 06/10/20                  [facility] *LIVE*               

  PAGE 2   


RUN TIME: 1121                            Specimen Inquiry                    


 

--------------------------------------------------------------------------------


------------





SPEC: [id number]    PATIENT: JESENIA BEAN                [id number]  

(Continued)


-------------------------------------------------

-------------------------------------------








 

--------------------------------------------------------------------------------


------------





  Procedure                         Result                                      

         


-------------------------

-------------------------------------------------------------------





  ANTIMICROBIAL SUSCEPTIBILITY  Preliminary   (continued)


        MEROPENEM                         <=1                   S


        PIPERACILLIN/TAZOBACTAM           64                    S


        TOBRAMYCIN                        <=2                   S


        Unless otherwise specified, Testing Performed by:


        [address] [address]


        For Inquires, the Physician may contact the Microbiology


        department at [phone]





--------------------------------------------------------------

------------------------------





Objective:


Assessment:


Patient with prolonged hospitalization more than 4 months


Multiple medical problems


Multiple surgical procedures





S/P Exp. 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 

parapsilosis & PSAE (I-merrem/ceftazidime/AZT/cefepime))


Leucocytosis -trending upward 


Fever 


Acute gallstone pancreatitis with persistent necrosis


  - 4/9.  CT A/P Increased ascites. Persistent evidence of necrotizing 

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


  - 4/27. status post ROBERT drain placement; C. parapsilosis. s/p drain 5/6 + yeast

& high amylase; s/p additional drain on 5/8. Drains removed. 


  -5/6. fluid  candida parapsilosis fluid, amylase high


  - 6/6 showed multiple pseudocysts, slight larger on the right. s/p drains x 3,

6/7.  + PSAE (MDRO-R Cefepime, Zosyn ANSON < 64) and yeast, 


  -6/7 s/p drain replacement x 3; fluid cult PSAE (MDRO), yeast; treated


  -7/12 CT A/P shows smaller fluid collections.  


  -722 CT abdomen and pelvis drains in place       


Ascites s/p paracentesis 4/15 & 5/6. C. parapsilosis 


Cholelithiasis with thickening of the gallbladder wall.


JED, Hyperkalemia, Metabolic acidosis off dialysis


Acute hypoxic resp failure. trach/vent. sputum 6/13  + PSAE (I merrem) ; sputum 

culture July 19+ for PSAE R Merrem, sensitive to cefepime


Pleural effusion status post CTS left side


 Abdominal fluid culture 6 /7 MDRO Pseudomonas, yeast


Sputum culture positive for MDRO Pseudomonas


Generalized debility





Plan:


Plan of Care


Restart Avycaz 


DC cefepime


cont micafungin and dapto


CK 11 on 7/19


repeat bc


C. difficile PCR


BC from 7/15 neg to date 


Monitor WBC/temp 


Wound care /drain management as directed


Leucocytosis could be from not adequate drainage of intraabdo fluid/abscess ...


Gen surgery following


Contact isolation for CRE/MDRO


D/w nursing 





Critically ill





Long term prognosis  poor











IVAN FRANZ MD           Jul 23, 2020 07:32

## 2020-07-23 NOTE — PDOC
PULMONARY PROGRESS NOTES


Subjective


Patient awake alert following commands on room air


Vitals





Vital Signs








  Date Time  Temp Pulse Resp B/P (MAP) Pulse Ox O2 Delivery O2 Flow Rate FiO2


 


7/23/20 08:43     100 Nasal Cannula 2.0 


 


7/23/20 04:00 98.5 118 30 122/66 (84)    





 98.5       








ROS:  No Nausea, No Chest Pain, No Increase Cough


General:  Alert


HEENT:  Other (trach site ok)


Lungs:  Crackles, Other (l ct)


Cardiovascular:  S1, S2


Abdomen:  Soft, Non-tender, Other (multiple ROBERT drains )


Neuro Exam:  Alert


Extremities:  Other (+1 BLE edema)


Skin:  Warm


Labs





Laboratory Tests








Test


 7/21/20


12:21 7/21/20


18:19 7/22/20


00:09 7/22/20


05:30


 


Glucose (Fingerstick)


 143 mg/dL


(70-99) 146 mg/dL


(70-99) 147 mg/dL


(70-99) 





 


White Blood Count


 


 


 


 16.7 x10^3/uL


(4.0-11.0)


 


Red Blood Count


 


 


 


 2.48 x10^6/uL


(3.50-5.40)


 


Hemoglobin


 


 


 


 6.9 g/dL


(12.0-15.5)


 


Hematocrit


 


 


 


 21.2 %


(36.0-47.0)


 


Mean Corpuscular Volume    86 fL () 


 


Mean Corpuscular Hemoglobin    28 pg (25-35) 


 


Mean Corpuscular Hemoglobin


Concent 


 


 


 33 g/dL


(31-37)


 


Red Cell Distribution Width


 


 


 


 15.9 %


(11.5-14.5)


 


Platelet Count


 


 


 


 583 x10^3/uL


(140-400)


 


Neutrophils (%) (Auto)    84 % (31-73) 


 


Lymphocytes (%) (Auto)    8 % (24-48) 


 


Monocytes (%) (Auto)    5 % (0-9) 


 


Eosinophils (%) (Auto)    3 % (0-3) 


 


Basophils (%) (Auto)    0 % (0-3) 


 


Neutrophils # (Auto)


 


 


 


 14.0 x10^3/uL


(1.8-7.7)


 


Lymphocytes # (Auto)


 


 


 


 1.3 x10^3/uL


(1.0-4.8)


 


Monocytes # (Auto)


 


 


 


 0.8 x10^3/uL


(0.0-1.1)


 


Eosinophils # (Auto)


 


 


 


 0.6 x10^3/uL


(0.0-0.7)


 


Basophils # (Auto)


 


 


 


 0.0 x10^3/uL


(0.0-0.2)


 


Test


 7/22/20


05:31 7/22/20


13:32 7/22/20


17:43 7/23/20


01:25


 


Glucose (Fingerstick)


 150 mg/dL


(70-99) 144 mg/dL


(70-99) 134 mg/dL


(70-99) 132 mg/dL


(70-99)


 


Test


 7/23/20


06:00 7/23/20


06:04 7/23/20


09:00 





 


Sodium Level


 134 mmol/L


(136-145) 


 


 





 


Potassium Level


 4.4 mmol/L


(3.5-5.1) 


 


 





 


Chloride Level


 102 mmol/L


() 


 


 





 


Carbon Dioxide Level


 28 mmol/L


(21-32) 


 


 





 


Anion Gap 4 (6-14)    


 


Blood Urea Nitrogen


 14 mg/dL


(7-20) 


 


 





 


Creatinine


 0.5 mg/dL


(0.6-1.0) 


 


 





 


Estimated GFR


(Cockcroft-Gault) 131.1 


 


 


 





 


Glucose Level


 121 mg/dL


(70-99) 


 


 





 


Calcium Level


 9.7 mg/dL


(8.5-10.1) 


 


 





 


Phosphorus Level


 4.1 mg/dL


(2.6-4.7) 


 


 





 


Magnesium Level


 1.9 mg/dL


(1.8-2.4) 


 


 





 


Triglycerides Level


 181 mg/dL


(0-150) 


 


 





 


Glucose (Fingerstick)


 


 117 mg/dL


(70-99) 


 





 


White Blood Count


 


 


 18.4 x10^3/uL


(4.0-11.0) 





 


Red Blood Count


 


 


 2.92 x10^6/uL


(3.50-5.40) 





 


Hemoglobin


 


 


 8.2 g/dL


(12.0-15.5) 





 


Hematocrit


 


 


 25.0 %


(36.0-47.0) 





 


Mean Corpuscular Volume   86 fL ()  


 


Mean Corpuscular Hemoglobin   28 pg (25-35)  


 


Mean Corpuscular Hemoglobin


Concent 


 


 33 g/dL


(31-37) 





 


Red Cell Distribution Width


 


 


 15.6 %


(11.5-14.5) 





 


Platelet Count


 


 


 532 x10^3/uL


(140-400) 





 


Neutrophils (%) (Auto)   85 % (31-73)  


 


Lymphocytes (%) (Auto)   6 % (24-48)  


 


Monocytes (%) (Auto)   5 % (0-9)  


 


Eosinophils (%) (Auto)   3 % (0-3)  


 


Basophils (%) (Auto)   1 % (0-3)  


 


Neutrophils # (Auto)


 


 


 15.7 x10^3/uL


(1.8-7.7) 





 


Lymphocytes # (Auto)


 


 


 1.2 x10^3/uL


(1.0-4.8) 





 


Monocytes # (Auto)


 


 


 0.9 x10^3/uL


(0.0-1.1) 





 


Eosinophils # (Auto)


 


 


 0.6 x10^3/uL


(0.0-0.7) 





 


Basophils # (Auto)


 


 


 0.1 x10^3/uL


(0.0-0.2) 











Laboratory Tests








Test


 7/22/20


13:32 7/22/20


17:43 7/23/20


01:25 7/23/20


06:00


 


Glucose (Fingerstick)


 144 mg/dL


(70-99) 134 mg/dL


(70-99) 132 mg/dL


(70-99) 





 


Sodium Level


 


 


 


 134 mmol/L


(136-145)


 


Potassium Level


 


 


 


 4.4 mmol/L


(3.5-5.1)


 


Chloride Level


 


 


 


 102 mmol/L


()


 


Carbon Dioxide Level


 


 


 


 28 mmol/L


(21-32)


 


Anion Gap    4 (6-14) 


 


Blood Urea Nitrogen


 


 


 


 14 mg/dL


(7-20)


 


Creatinine


 


 


 


 0.5 mg/dL


(0.6-1.0)


 


Estimated GFR


(Cockcroft-Gault) 


 


 


 131.1 





 


Glucose Level


 


 


 


 121 mg/dL


(70-99)


 


Calcium Level


 


 


 


 9.7 mg/dL


(8.5-10.1)


 


Phosphorus Level


 


 


 


 4.1 mg/dL


(2.6-4.7)


 


Magnesium Level


 


 


 


 1.9 mg/dL


(1.8-2.4)


 


Triglycerides Level


 


 


 


 181 mg/dL


(0-150)


 


Test


 7/23/20


06:04 7/23/20


09:00 


 





 


Glucose (Fingerstick)


 117 mg/dL


(70-99) 


 


 





 


White Blood Count


 


 18.4 x10^3/uL


(4.0-11.0) 


 





 


Red Blood Count


 


 2.92 x10^6/uL


(3.50-5.40) 


 





 


Hemoglobin


 


 8.2 g/dL


(12.0-15.5) 


 





 


Hematocrit


 


 25.0 %


(36.0-47.0) 


 





 


Mean Corpuscular Volume  86 fL ()   


 


Mean Corpuscular Hemoglobin  28 pg (25-35)   


 


Mean Corpuscular Hemoglobin


Concent 


 33 g/dL


(31-37) 


 





 


Red Cell Distribution Width


 


 15.6 %


(11.5-14.5) 


 





 


Platelet Count


 


 532 x10^3/uL


(140-400) 


 





 


Neutrophils (%) (Auto)  85 % (31-73)   


 


Lymphocytes (%) (Auto)  6 % (24-48)   


 


Monocytes (%) (Auto)  5 % (0-9)   


 


Eosinophils (%) (Auto)  3 % (0-3)   


 


Basophils (%) (Auto)  1 % (0-3)   


 


Neutrophils # (Auto)


 


 15.7 x10^3/uL


(1.8-7.7) 


 





 


Lymphocytes # (Auto)


 


 1.2 x10^3/uL


(1.0-4.8) 


 





 


Monocytes # (Auto)


 


 0.9 x10^3/uL


(0.0-1.1) 


 





 


Eosinophils # (Auto)


 


 0.6 x10^3/uL


(0.0-0.7) 


 





 


Basophils # (Auto)


 


 0.1 x10^3/uL


(0.0-0.2) 


 











Medications





Active Scripts








 Medications  Dose


 Route/Sig


 Max Daily Dose Days Date Category


 


 Bisoprolol


 Fumarate 5 Mg


 Tablet  10 Mg


 PO DAILY


   3/16/20 Reported








Comments


cxr 7/13, reviewed,   b ll infilt effusion atelectasis





ct reviewed 7/12/20, Decreased left-sided effusion after catheter placement. The




right-sided effusion has increased as has atelectasis.


 





 


There has been exchange or placement of multiple drainage 


tubes and a gastrojejunostomy tube. Both collections are smaller. No 


significant new abdominal fluid collection is seen. The jejunal component 


of the gastrojejunostomy tube appears to be looped in the proximal small 


bowel. 











ct abdomen /pelvis 6/6


1. Removal of the percutaneous pigtail drainage catheters since the prior 


exam. Sequela of pancreatitis with extensive pseudocysts again 


demonstrated, the right-sided collections are slightly larger since the 


prior exam, the left-sided collections are stable. See above.


2. Moderate to large left pleural effusion with atelectasis and collapse 


of most of the left lower lobe, stable. Small right pleural effusion is 


stable.


3. Gallstone.





ct chest 6/15 reviewed








 GRAM NEG COCCOBACILLI:MANY


        SQUAMOUS EPI CELL:RARE


        PMN (WBCs):[id number]


        Unless otherwise specified, Testing Performed by:


        [facility]


        [address] [place] [address]


        For Inquires, the Physician may contact the Microbiology


        department at [phone]





  RESPIRATORY CULTURE  Final  


        Final





        MANY GRAM NEGATIVE RODS on 06/15/20 at 1107


        FINAL ID= [PSEUDOMONAS AERUGINOSA]


        MICRO CHARGES


        PSEUDOMONAS AERUGINOSA





  ANTIMICROBIAL SUSCEPTIBILITY  Final  


        Comment





        NEG ANSON 56


        PSEUDOMONAS AERUGINOSA


        ANTIBIOTIC                        RESULT          INTERPRETATION


        AMIKACIN                          <=16                  S


        AZTREONAM                         <=4                   S


        CEFTAZIDIME                       <=1                   S


        CIPROFLOXACIN                     <=0.25                S


        CEFEPIME                          <=2                   S


        CEFTAZIDIME/AVIBACTAM             <=4                   S


        GENTAMICIN                        <=2                   S


        LEVOFLOXACIN                      <=0.5                 S





Impression


.





IMPRESSION:


1.  Acute hypoxemic respiratory failure secondary to ARDS status post trach, 

developed anemia 6/7, blood drainage from RLQ abdomen drain site, and 

surrounding firmness  / developed septic shock 6/7 from abdomen source, required

levo 6/7


s/p 3 new drains 6/7 with brown color drainage,  


2.  Gallstone pancreatitis, now with ongoing bleeding from prior drain. Anemic. 

s/p Tx multiple units over several days


3.  septic shock/sepsis, recurrent 6/7, source abdomen. new fever  ? aspiration 

pneumonitis/pneumonia


4.  Acute kidney injury-, Off HD--renal function decling. suspect JED on CKD due

to hypotension , improved now


5.  Acute gallstone pancreatitis.


6.  Hypoalbuminemia.


7.  Moderate persistent effusions, s/p left thora  5/12, reaccumulation of left 

effusion. O2 requirement not changed. 


8.  Fever- ,hypotension. suspect recurrent sepsis/ likely pancreatic source.  

Per ID, per surgery--


9.  Acute drop in hemoglobin


10. Covid 19 testing negative


11. Moderate to large ascites-S/P paracentisis


12.S/P paracentisis with 4 liters removed on 4/15/20


13. S/P IR drain placement on 5/8/2020, removal, re inserted 6/7


14. Depression/Anxiety 


15.,  Fever, per ID


16.  Status post chest tube placement, not much drainage


6/30


S/P Exploratory laparotomy, lysis of adhesions, subtotal cholecystectomy with 

cholangiogram, gastrojejunostomy tube placement, pancreatic necrosectomy


leukocytosis- improving 





7/23 fluid from the chest tube


  GRAM STAIN  Final  


        Final





        NO ORGANISMS SEEN.


        SQUAMOUS EPI CELL:NOT APPLICABLE


        PMN (WBCs):[id number]


        Unless otherwise specified, Testing Performed by:


        [address] [address]


        For Inquires, the Physician may contact the Microbiology


        department at [phone]





  ANAEROBIC-AEROBIC CULTURE  Preliminary  


        Preliminary





        No Growth on 07/22/20 at 0957


        Unless otherwise specified, Testing Performed by:


        [address] [address]


        For Inquires, the Physician may contact the Microbiology


        department at [phone]











Antibiotics per ID, since 7/23


Restart Avycaz 


DC cefepime


cont micafungin and dapto





Plan


.


So far no growth from fluid from the chest tube will continue current support


Transfuse as needed


Antibiotics per ID changes made,Change Avycaz to cefepime cont micafungin and 

dapto


Discussed with RN afebrile this morning


Monitor H&H


Trach shield, as tolerated, on room air


Up to chair, pt/ot


Follow culture


Follow surgery input


DVT GI prophylaxis


f/u BC /resp cultures 


Continue TPN for nutrition support 


DVT/GI PPX











STEVE MIRANDA MD              Jul 23, 2020 09:17

## 2020-07-23 NOTE — NUR
Pharmacy TPN Dosing Note



S: JESENIA BEAN is a 49 year old F Currently receiving Central Continuous TPN started 
03/18/20



B:Pertinent PMH: Necrotizing pancreatitis

Height: 5 feet, 8 inches

Weight: 84.3 kg



Current diet: NPO 



LABS:

Sodium:    134 

Potassium: 4.4 

Chloride:  102 

Calcium:   9.7 

Corrected Calcium: 11.70 

Magnesium: 1.9 

CO2:       28 

SCr:       0.5 

Glucose:   117-144 

Albumin:   1.5 

AST:       25 

ALT:       37 



TPN FORMULA:

TPN TYPE:  Central Continuous

AMINO ACIDS:         95 gm

DEXTROSE:            250 gm

LIPIDS:              30 gm



SODIUM CHLORIDE:     120 mEq

SODIUM PHOSPHATE:    10 mmol

POTASSIUM CHLORIDE:  30 mEq

POTASSIUM ACETATE:   30 mEq

MAGNESIUM:           15 mEq

INSULIN:             15 units

MULTIPLE VITAMIN:    10 ml

TRACE ELEMENTS:      1 ml 



TPN PLAN:  Continue same.





R: Continue TPN 

Will monitor electrolytes, glucose, and tolerance to TPN.



 Maribell Vazquez RPH, 07/23/20 0855

-------------------------------------------------------------------------------

Addendum: 07/23/20 at 1143 by Maribell Vazquez RPH PHA

-------------------------------------------------------------------------------

Dextrose increased to 275g per dietician rec's

## 2020-07-24 VITALS — DIASTOLIC BLOOD PRESSURE: 81 MMHG | SYSTOLIC BLOOD PRESSURE: 121 MMHG

## 2020-07-24 VITALS — DIASTOLIC BLOOD PRESSURE: 77 MMHG | SYSTOLIC BLOOD PRESSURE: 128 MMHG

## 2020-07-24 VITALS — DIASTOLIC BLOOD PRESSURE: 87 MMHG | SYSTOLIC BLOOD PRESSURE: 146 MMHG

## 2020-07-24 VITALS — DIASTOLIC BLOOD PRESSURE: 68 MMHG | SYSTOLIC BLOOD PRESSURE: 119 MMHG

## 2020-07-24 VITALS — SYSTOLIC BLOOD PRESSURE: 124 MMHG | DIASTOLIC BLOOD PRESSURE: 94 MMHG

## 2020-07-24 VITALS — DIASTOLIC BLOOD PRESSURE: 64 MMHG | SYSTOLIC BLOOD PRESSURE: 136 MMHG

## 2020-07-24 VITALS — SYSTOLIC BLOOD PRESSURE: 123 MMHG | DIASTOLIC BLOOD PRESSURE: 63 MMHG

## 2020-07-24 LAB
ALBUMIN SERPL-MCNC: 1.1 G/DL (ref 3.4–5)
ALBUMIN/GLOB SERPL: 0.3 {RATIO} (ref 1–1.7)
ALP SERPL-CCNC: 129 U/L (ref 46–116)
ALT SERPL-CCNC: 15 U/L (ref 14–59)
ANION GAP SERPL CALC-SCNC: 7 MMOL/L (ref 6–14)
AST SERPL-CCNC: 16 U/L (ref 15–37)
BASOPHILS # BLD AUTO: 0.1 X10^3/UL (ref 0–0.2)
BASOPHILS NFR BLD: 1 % (ref 0–3)
BILIRUB SERPL-MCNC: 0.5 MG/DL (ref 0.2–1)
BUN SERPL-MCNC: 13 MG/DL (ref 7–20)
BUN/CREAT SERPL: 22 (ref 6–20)
CALCIUM SERPL-MCNC: 9.4 MG/DL (ref 8.5–10.1)
CHLORIDE SERPL-SCNC: 103 MMOL/L (ref 98–107)
CO2 SERPL-SCNC: 26 MMOL/L (ref 21–32)
CREAT SERPL-MCNC: 0.6 MG/DL (ref 0.6–1)
EOSINOPHIL NFR BLD: 0.3 X10^3/UL (ref 0–0.7)
EOSINOPHIL NFR BLD: 2 % (ref 0–3)
ERYTHROCYTE [DISTWIDTH] IN BLOOD BY AUTOMATED COUNT: 16.7 % (ref 11.5–14.5)
GFR SERPLBLD BASED ON 1.73 SQ M-ARVRAT: 106.3 ML/MIN
GLOBULIN SER-MCNC: 4 G/DL (ref 2.2–3.8)
GLUCOSE SERPL-MCNC: 125 MG/DL (ref 70–99)
HCT VFR BLD CALC: 24.3 % (ref 36–47)
HGB BLD-MCNC: 7.9 G/DL (ref 12–15.5)
LYMPHOCYTES # BLD: 1.5 X10^3/UL (ref 1–4.8)
LYMPHOCYTES NFR BLD AUTO: 9 % (ref 24–48)
MCH RBC QN AUTO: 30 PG (ref 25–35)
MCHC RBC AUTO-ENTMCNC: 32 G/DL (ref 31–37)
MCV RBC AUTO: 91 FL (ref 79–100)
MONO #: 1 X10^3/UL (ref 0–1.1)
MONOCYTES NFR BLD: 6 % (ref 0–9)
NEUT #: 14.1 X10^3/UL (ref 1.8–7.7)
NEUTROPHILS NFR BLD AUTO: 83 % (ref 31–73)
PLATELET # BLD AUTO: 569 X10^3/UL (ref 140–400)
POTASSIUM SERPL-SCNC: 4.2 MMOL/L (ref 3.5–5.1)
PROT SERPL-MCNC: 5.1 G/DL (ref 6.4–8.2)
RBC # BLD AUTO: 2.66 X10^6/UL (ref 3.5–5.4)
SODIUM SERPL-SCNC: 136 MMOL/L (ref 136–145)
WBC # BLD AUTO: 16.9 X10^3/UL (ref 4–11)

## 2020-07-24 RX ADMIN — ACETYLCYSTEINE SCH MG: 200 INHALANT RESPIRATORY (INHALATION) at 07:58

## 2020-07-24 RX ADMIN — CEFTAZIDIME, AVIBACTAM SCH MLS/HR: 2; .5 POWDER, FOR SOLUTION INTRAVENOUS at 06:00

## 2020-07-24 RX ADMIN — HYDROCODONE BITARTRATE AND ACETAMINOPHEN PRN TAB: 5; 325 TABLET ORAL at 19:34

## 2020-07-24 RX ADMIN — Medication PRN EACH: at 11:30

## 2020-07-24 RX ADMIN — PANTOPRAZOLE SODIUM SCH MG: 40 INJECTION, POWDER, FOR SOLUTION INTRAVENOUS at 07:26

## 2020-07-24 RX ADMIN — IPRATROPIUM BROMIDE AND ALBUTEROL SULFATE SCH ML: .5; 3 SOLUTION RESPIRATORY (INHALATION) at 01:20

## 2020-07-24 RX ADMIN — IPRATROPIUM BROMIDE AND ALBUTEROL SULFATE SCH ML: .5; 3 SOLUTION RESPIRATORY (INHALATION) at 04:19

## 2020-07-24 RX ADMIN — ACETYLCYSTEINE SCH MG: 200 INHALANT RESPIRATORY (INHALATION) at 20:33

## 2020-07-24 RX ADMIN — ENOXAPARIN SODIUM SCH MG: 40 INJECTION SUBCUTANEOUS at 08:25

## 2020-07-24 RX ADMIN — INSULIN LISPRO SCH UNITS: 100 INJECTION, SOLUTION INTRAVENOUS; SUBCUTANEOUS at 06:00

## 2020-07-24 RX ADMIN — IPRATROPIUM BROMIDE AND ALBUTEROL SULFATE SCH ML: .5; 3 SOLUTION RESPIRATORY (INHALATION) at 07:58

## 2020-07-24 RX ADMIN — DEXTROSE SCH MLS/HR: 50 INJECTION, SOLUTION INTRAVENOUS at 08:24

## 2020-07-24 RX ADMIN — IPRATROPIUM BROMIDE AND ALBUTEROL SULFATE SCH ML: .5; 3 SOLUTION RESPIRATORY (INHALATION) at 16:34

## 2020-07-24 RX ADMIN — HYDROCODONE BITARTRATE AND ACETAMINOPHEN PRN TAB: 5; 325 TABLET ORAL at 13:05

## 2020-07-24 RX ADMIN — BACITRACIN SCH MLS/HR: 5000 INJECTION, POWDER, FOR SOLUTION INTRAMUSCULAR at 14:33

## 2020-07-24 RX ADMIN — INSULIN LISPRO SCH UNITS: 100 INJECTION, SOLUTION INTRAVENOUS; SUBCUTANEOUS at 12:26

## 2020-07-24 RX ADMIN — INSULIN LISPRO SCH UNITS: 100 INJECTION, SOLUTION INTRAVENOUS; SUBCUTANEOUS at 00:00

## 2020-07-24 RX ADMIN — INSULIN LISPRO SCH UNITS: 100 INJECTION, SOLUTION INTRAVENOUS; SUBCUTANEOUS at 17:24

## 2020-07-24 RX ADMIN — INSULIN LISPRO SCH UNITS: 100 INJECTION, SOLUTION INTRAVENOUS; SUBCUTANEOUS at 23:07

## 2020-07-24 RX ADMIN — HYDROCODONE BITARTRATE AND ACETAMINOPHEN PRN TAB: 5; 325 TABLET ORAL at 09:52

## 2020-07-24 RX ADMIN — DAPTOMYCIN SCH MLS/HR: 500 INJECTION, POWDER, LYOPHILIZED, FOR SOLUTION INTRAVENOUS at 08:25

## 2020-07-24 RX ADMIN — IPRATROPIUM BROMIDE AND ALBUTEROL SULFATE SCH ML: .5; 3 SOLUTION RESPIRATORY (INHALATION) at 12:38

## 2020-07-24 RX ADMIN — IPRATROPIUM BROMIDE AND ALBUTEROL SULFATE SCH ML: .5; 3 SOLUTION RESPIRATORY (INHALATION) at 20:32

## 2020-07-24 RX ADMIN — CEFTAZIDIME, AVIBACTAM SCH MLS/HR: 2; .5 POWDER, FOR SOLUTION INTRAVENOUS at 21:30

## 2020-07-24 RX ADMIN — CEFTAZIDIME, AVIBACTAM SCH MLS/HR: 2; .5 POWDER, FOR SOLUTION INTRAVENOUS at 14:08

## 2020-07-24 NOTE — PDOC
Infectious Disease Note


Subjective:


Subjective


Pt awake


No fevers last 24-hour


TPN





Vital Signs:


Vital Signs





Vital Signs








  Date Time  Temp Pulse Resp B/P (MAP) Pulse Ox O2 Delivery O2 Flow Rate FiO2


 


7/24/20 08:01     97 Room Air  


 


7/24/20 06:00 98.8       





 98.8       


 


7/24/20 04:00  135 37 119/68 (85)    


 


7/23/20 08:43       2.0 











Physical Exam:


PHYSICAL EXAM


GENERAL: sitting in chair, mild distress 


HEENT: Pupils equal, oral cavity dry. NGT out


NECK:  Tracheostomy 


LUNGS: Diminished aeration bases,  CT on left 


HEART:  S1, S2, regular, tachy 110s 


ABDOMEN: Distended, bowel sounds hypoactive, soft, richardson x 2, 3 ROBERT drains, G-J 

tube


: [place] in place 


EXTREMITIES: Trace generalized edema, no cyanosis. MARTHA hose bilaterally, 


SKIN: warm touch. No signs of rash.  


LUE-PICC without signs of complications





Medications:


Inpatient Meds:





Current Medications








 Medications


  (Trade)  Dose


 Ordered  Sig/Yee  Start Time


 Stop Time Status Last Admin


Dose Admin


 


 Acetaminophen


  (Tylenol Supp)  650 mg  PRN Q6HRS  PRN  3/24/20 10:30


    7/22/20 20:53


650 MG


 


 Acetaminophen


  (Tylenol)  650 mg  PRN Q6HRS  PRN  3/21/20 03:36


 5/13/20 10:25 DC 4/16/20 19:56


650 MG


 


 Acetaminophen/


 Hydrocodone Bitart


  (Lortab 5/325)  1 tab  PRN Q4HRS  PRN  7/23/20 16:00


    7/23/20 20:49


1 TAB


 


 Acetylcysteine


  (Mucomyst 20%


 Resp Treatment)  600 mg  RTBID  6/27/20 12:00


    7/24/20 07:58


600 MG


 


 Albumin Human  500 ml @ 


 125 mls/hr  PRN Q1HR  PRN  6/30/20 15:45


     





 


 Albuterol Sulfate


  (Ventolin Neb


 Soln)  2.5 mg  1X  ONCE  3/17/20 22:30


 3/17/20 22:31 DC 3/18/20 00:56


2.5 MG


 


 Albuterol/


 Ipratropium


  (Duoneb)  3 ml  Q4HRS  6/13/20 08:00


    7/24/20 07:58


3 ML


 


 Alprazolam


  (Xanax)  0.5 mg  PRN QID  PRN  7/23/20 16:00


    7/23/20 22:31


0.5 MG


 


 Alteplase,


 Recombinant


  (Cathflo For


 Central Catheter


 Clearance)  1 mg  1X  ONCE  7/21/20 12:00


 7/21/20 12:01 DC 7/21/20 12:17


1 MG


 


 Alteplase,


 Recombinant 4 mg/


 Sodium Chloride  20 ml @ 20


 mls/hr  1X  ONCE  6/17/20 10:00


 6/17/20 10:59 DC 6/17/20 10:09


20 MLS/HR


 


 Amino Acids/


 Glycerin/


 Electrolytes  1,000 ml @ 


 75 mls/hr  [id number]  4/20/20 21:15


   UNV  





 


 Artificial Tears


  (Artificial


 Tears)  1 drop  PRN Q15MIN  PRN  4/29/20 05:30


    6/23/20 21:17


1 DROP


 


 Atenolol


  (Tenormin)  100 mg  DAILY  3/17/20 09:00


 3/16/20 20:08 DC  





 


 Atropine Sulfate


  (ATROPINE 0.5mg


 SYRINGE)  0.5 mg  PRN Q5MIN  PRN  4/2/20 08:15


     





 


 Barium Sulfate


  (Varibar Thin


 Liquid Apple)  148 gm  1X  ONCE  5/26/20 11:45


 5/26/20 11:49 DC  





 


 Benzocaine


  (Hurricaine One)  1 spray  1X  ONCE  3/20/20 14:30


 3/20/20 14:31 DC 3/20/20 16:38


1 SPRAY


 


 Bisacodyl


  (Dulcolax Supp)  10 mg  STK-MED ONCE  4/27/20 10:59


 4/27/20 10:59 DC  





 


 Bumetanide


  (Bumex)  2 mg  DAILY  5/8/20 10:00


 5/18/20 17:15 DC 5/18/20 08:07


2 MG


 


 Bupivacaine HCl/


 Epinephrine Bitart


  (Sensorcain-Epi


 0.5%-1:600291 Mpf)  30 ml  STK-MED ONCE  6/30/20 08:34


 6/30/20 08:35 DC  





 


 Calcium Carbonate/


 Glycine


  (Tums)  500 mg  PRN [id number]  PRN  3/18/20 17:45


 5/13/20 10:25 DC  





 


 Calcium Chloride


 1000 mg/Sodium


 Chloride  110 ml @ 


 220 mls/hr  1X  ONCE  3/17/20 22:30


 3/17/20 22:59 DC 3/17/20 22:11


220 MLS/HR


 


 Calcium Chloride


 3000 mg/Sodium


 Chloride  1,030 ml @ 


 50 mls/hr  [id number]  3/19/20 08:00


 3/21/20 15:23 DC 3/21/20 02:17


50 MLS/HR


 


 Calcium Gluconate


  (Calcium


 Gluconate)  2,000 mg  1X  ONCE  3/19/20 02:15


 3/19/20 02:16 DC 3/19/20 02:19


2,000 MG


 


 Calcium Gluconate


 1000 mg/Sodium


 Chloride  110 ml @ 


 220 mls/hr  1X  ONCE  3/18/20 03:30


 3/18/20 03:59 DC 3/18/20 03:21


220 MLS/HR


 


 Calcium Gluconate


 2000 mg/Sodium


 Chloride  120 ml @ 


 220 mls/hr  1X  ONCE  3/18/20 07:30


 3/18/20 08:02 DC 3/18/20 09:05


220 MLS/HR


 


 Cefepime HCl


  (Maxipime)  2 gm  Q12HR  7/22/20 09:00


 7/23/20 07:30 DC 7/22/20 20:53


2 GM


 


 Ceftazidime/


 Avibactam 2.5 gm/


 Sodium Chloride  250 ml @ 


 125 mls/hr  Q8HRS  7/23/20 08:00


    7/24/20 06:00


125 MLS/HR


 


 Cellulose


  (Surgicel


 Fibrillar 1x2)  1 each  STK-MED ONCE  4/6/20 11:00


 4/6/20 11:01 DC  





 


 Cellulose


  (Surgicel


 Hemostat 2x14)  1 each  STK-MED ONCE  4/27/20 10:58


 4/27/20 10:59 DC  





 


 Cellulose


  (Surgicel


 Hemostat 4x8)  1 each  STK-MED ONCE  4/27/20 10:58


 4/27/20 10:59 DC  





 


 Chlorhexidine


 Gluconate


  (Peridex)  15 ml  BID  6/13/20 09:00


 6/13/20 07:58 DC  





 


 Ciprofloxacin/


 Dextrose  200 ml @ 


 200 mls/hr  Q12HR  7/12/20 10:00


 7/21/20 08:20 DC 7/20/20 21:02


200 MLS/HR


 


 Cyclobenzaprine


 HCl


  (Flexeril)  10 mg  PRN Q6HRS  PRN  4/30/20 10:45


    7/10/20 19:12


10 MG


 


 Daptomycin 410 mg/


 Sodium Chloride  50 ml @ 


 100 mls/hr  Q24H  6/7/20 14:00


 6/10/20 08:30 DC 6/9/20 13:33


100 MLS/HR


 


 Daptomycin 430 mg/


 Sodium Chloride  50 ml @ 


 100 mls/hr  Q24H  4/25/20 13:00


 4/30/20 20:58 DC 4/30/20 13:00


100 MLS/HR


 


 Daptomycin 450 mg/


 Sodium Chloride  50 ml @ 


 100 mls/hr  Q24H  5/17/20 09:00


 5/21/20 08:30 DC 5/20/20 09:25


100 MLS/HR


 


 Daptomycin 485 mg/


 Sodium Chloride  50 ml @ 


 100 mls/hr  Q24H  5/4/20 11:00


 5/12/20 07:44 DC 5/11/20 13:10


100 MLS/HR


 


 Daptomycin 500 mg/


 Sodium Chloride  50 ml @ 


 100 mls/hr  Q24H  7/15/20 09:00


    7/24/20 08:25


100 MLS/HR


 


 Desflurane


  (Suprane)  90 ml  STK-MED ONCE  6/30/20 10:18


 6/30/20 10:19 DC  





 


 Dexamethasone


 Sodium Phosphate


  (Decadron)  4 mg  STK-MED ONCE  4/27/20 10:56


 4/27/20 10:57 DC  





 


 Dexmedetomidine


 HCl 400 mcg/


 Sodium Chloride  100 ml @ 0


 mls/hr  CONT  PRN  4/2/20 08:15


 5/30/20 18:31 DC 5/30/20 12:57


8 MLS/HR


 


 Dextrose


  (Dextrose


 50%-Water Syringe)  12.5 gm  PRN Q15MIN  PRN  3/16/20 09:30


     





 


 Digoxin


  (Lanoxin)  125 mcg  1X  ONCE  3/19/20 18:00


 3/19/20 18:01 DC 3/19/20 17:10


125 MCG


 


 Diphenhydramine


 HCl


  (Benadryl)  25 mg  1X  ONCE  7/16/20 19:00


 7/16/20 19:01 DC 7/16/20 18:56


25 MG


 


 Duloxetine HCl


  (Cymbalta)  30 mg  DAILY  5/10/20 14:00


 5/13/20 10:25 DC 5/11/20 09:48


30 MG


 


 Enoxaparin Sodium


  (Lovenox 100mg


 Syringe)  100 mg  Q12HR  4/21/20 21:00


   UNV  





 


 Enoxaparin Sodium


  (Lovenox 40mg


 Syringe)  40 mg  Q24H  7/1/20 08:00


    7/24/20 08:25


40 MG


 


 Ephedrine Sulfate


  (ePHEDrine PF IN


 SALINE SYRINGE)  50 mg  STK-MED ONCE  6/30/20 14:45


 6/30/20 14:45 DC  





 


 Etomidate


  (Amidate)  8 mg  1X  ONCE  3/23/20 08:30


 3/23/20 08:31 DC 3/23/20 08:33


8 MG


 


 Fentanyl


  (Duragesic 12mcg/


 Hr Patch)  1 patch  Q3DAYS  7/10/20 09:00


    7/22/20 09:00


1 PATCH


 


 Fentanyl


  (Duragesic 50mcg/


 Hr Patch)  1 patch  Q72H  6/4/20 21:00


 6/13/20 12:00 DC 6/4/20 21:22


1 PATCH


 


 Fentanyl Citrate  30 ml @ 0


 mls/hr  CONT  PRN  7/9/20 17:30


     





 


 Fentanyl Citrate


  (Fentanyl 2ml


 Vial)  100 mcg  STK-MED ONCE  6/30/20 07:44


 6/30/20 07:44 DC  





 


 Fentanyl Citrate


  (Fentanyl 5ml


 Vial)  250 mcg  1X  ONCE  5/8/20 09:15


 5/8/20 09:16 DC 5/8/20 09:30


50 MCG


 


 Flumazenil


  (Romazicon)  0.5 mg  STK-MED ONCE  6/7/20 14:48


 6/7/20 14:48 DC  





 


 Fluoxetine HCl


  (PROzac)  20 mg  QHS  6/4/20 21:00


    7/23/20 20:48


20 MG


 


 Furosemide


  (Lasix)  40 mg  1X  ONCE  6/24/20 15:30


 6/24/20 15:33 DC 6/24/20 16:27


40 MG


 


 Haloperidol


 Lactate


  (Haldol Inj)  3 mg  1X  ONCE  5/4/20 14:30


 5/4/20 14:31 DC 5/4/20 14:37


3 MG


 


 Heparin Sodium


  (Porcine)


  (Hep Lock Adult)  500 unit  STK-MED ONCE  4/7/20 09:29


 4/7/20 09:30 DC  





 


 Heparin Sodium


  (Porcine)


  (Heparin Sodium)  5,000 unit  Q12HR  4/27/20 21:00


 5/7/20 09:59 DC 5/6/20 20:57


5,000 UNIT


 


 Heparin Sodium


  (Porcine) 1000


 unit/Sodium


 Chloride  1,001 ml @ 


 1,001 mls/hr  1X  ONCE  6/30/20 06:00


 6/30/20 06:59 DC  





 


 Hydromorphone HCl


  (Dilaudid


 Standard PCA)  12 mg  STK-MED ONCE  5/1/20 15:50


 5/12/20 11:24 DC  





 


 Hydromorphone HCl


  (Dilaudid)  1 mg  PRN Q4HRS  PRN  5/4/20 19:00


 5/18/20 17:10 DC 5/18/20 06:25


1 MG


 


 Info


  (CONTRAST GIVEN


 -- Rx MONITORING)  1 each  PRN DAILY  PRN  7/21/20 11:45


 7/23/20 11:44 DC  





 


 Info


  (Icu Electrolyte


 Protocol)  1 ea  CONT PRN  PRN  3/29/20 13:15


     





 


 Info


  (PHARMACY


 MONITORING -- do


 not chart)  1 each  PRN DAILY  PRN  4/24/20 15:45


 5/26/20 14:14 DC  





 


 Info


  (Tpn Per


 Pharmacy)  1 each  PRN DAILY  PRN  3/18/20 12:30


   UNV  





 


 Insulin Human


 Lispro


  (HumaLOG)  0-9 UNITS  Q6HRS  3/16/20 09:30


    7/20/20 14:42


4 UNITS


 


 Insulin Human


 Regular


  (HumuLIN R VIAL)  5 unit  1X  ONCE  3/17/20 22:30


 3/17/20 22:31 DC 3/17/20 22:14


5 UNIT


 


 Iohexol


  (Omnipaque 240


 Mg/ml)  30 ml  1X  ONCE  3/30/20 11:30


 3/30/20 11:33 DC 3/30/20 11:30


30 ML


 


 Iohexol


  (Omnipaque 300


 Mg/ml)  75 ml  1X  ONCE  7/21/20 11:30


 7/21/20 11:31 DC 7/21/20 11:30


75 ML


 


 Iohexol


  (Omnipaque 350


 Mg/ml)  90 ml  1X  ONCE  3/16/20 03:30


 3/16/20 03:31 DC 3/16/20 03:25


90 ML


 


 Ketorolac


 Tromethamine


  (Toradol 30mg


 Vial)  30 mg  1X  ONCE  3/16/20 03:00


 3/16/20 03:01 DC 3/16/20 02:54


30 MG


 


 Lidocaine HCl


  (Buffered


 Lidocaine 1%)  3 ml  1X  ONCE  6/15/20 13:00


 6/15/20 13:01 DC 6/15/20 13:11


12 ML


 


 Lidocaine HCl


  (Glydo


  (Lidocaine) Jelly)  1 thomas  1X  ONCE  3/20/20 14:30


 3/20/20 14:31 DC 3/20/20 16:38


1 THOMAS


 


 Lidocaine HCl


  (Lidocaine 1%


 20ml Vial)  20 ml  1X  ONCE  6/7/20 15:00


 6/7/20 15:01 DC 6/7/20 15:30


20 ML


 


 Lidocaine HCl


  (Lidocaine Pf 2%


 Vial)  5 ml  STK-MED ONCE  6/30/20 07:44


 6/30/20 07:44 DC  





 


 Lidocaine HCl


  (Xylocaine-Mpf


 1% 2ml Vial)  2 ml  PRN 1X  PRN  4/27/20 07:00


 4/28/20 06:59 DC  





 


 Linezolid/Dextrose  300 ml @ 


 300 mls/hr  Q12HR  5/17/20 09:00


 5/20/20 08:11 DC 5/19/20 21:08


300 MLS/HR


 


 Lorazepam


  (Ativan Inj)  0.25 mg  PRN Q4HRS  PRN  6/3/20 07:30


    7/20/20 03:28


0.25 MG


 


 Magnesium Sulfate  50 ml @ 25


 mls/hr  1X  ONCE  6/30/20 16:30


 6/30/20 18:29 DC 6/30/20 17:02


25 MLS/HR


 


 Meropenem 1 gm/


 Sodium Chloride  100 ml @ 


 200 mls/hr  Q12HR  6/7/20 21:00


 6/25/20 08:56 DC 6/25/20 08:27


200 MLS/HR


 


 Meropenem 500 mg/


 Sodium Chloride  50 ml @ 


 100 mls/hr  Q6HRS  6/28/20 18:00


 7/21/20 08:23 DC 7/21/20 06:15


100 MLS/HR


 


 Methylprednisolone


 Sodium Succinate


  (SOLU-Medrol


 125MG VIAL)  125 mg  1X  ONCE  6/13/20 06:15


 6/13/20 06:16 DC 6/13/20 06:26


125 MG


 


 Metoclopramide HCl


  (Reglan Vial)  10 mg  PRN Q3HRS  PRN  5/9/20 16:45


    5/14/20 04:25


10 MG


 


 Metoprolol


 Tartrate


  (Lopressor Vial)  5 mg  PRN Q6HRS  PRN  6/10/20 09:00


    7/23/20 21:19


5 MG


 


 Metronidazole  100 ml @ 


 100 mls/hr  Q8HRS  4/14/20 10:00


 4/21/20 08:10 DC 4/21/20 06:04


100 MLS/HR


 


 Micafungin Sodium


 100 mg/Dextrose  100 ml @ 


 100 mls/hr  Q24H  6/30/20 08:30


    7/24/20 08:24


100 MLS/HR


 


 Midazolam HCl


  (Versed)  2 mg  1X  ONCE  6/7/20 15:00


 6/7/20 15:01 DC 6/7/20 15:28


1 MG


 


 Midazolam HCl 100


 mg/Sodium Chloride  100 ml @ 1


 mls/hr  CONT  PRN  6/30/20 14:45


    7/3/20 18:48


10 MLS/HR


 


 Midazolam HCl 50


 mg/Sodium Chloride  50 ml @ 0


 mls/hr  CONT  PRN  3/23/20 08:15


 3/28/20 15:59 DC 3/26/20 22:39


7 MLS/HR


 


 Morphine Sulfate


  (Morphine


 Sulfate)  1 mg  PRN Q1HR  PRN  6/30/20 14:45


     





 


 Multi-Ingred


 Cream/Lotion/Oil/


 Oint


  (Artificial


 Tears Eye


 Ointment)  1 thomas  PRN Q1HR  PRN  3/25/20 17:30


 6/3/20 14:39 DC 4/13/20 08:19


1 THOMAS


 


 Naloxone HCl


  (Narcan)  0.4 mg  PRN Q2MIN  PRN  6/30/20 14:45


     





 


 Norepinephrine


 Bitartrate 8 mg/


 Dextrose  258 ml @ 


 13.332 mls/


 hr  CONT  PRN  6/7/20 06:30


    7/2/20 09:09


1.6 MLS/HR


 


 Ondansetron HCl


  (Zofran)  4 mg  STK-MED ONCE  6/30/20 13:33


 6/30/20 13:33 DC  





 


 Pantoprazole


 Sodium


  (PROTONIX VIAL


 for IV PUSH)  40 mg  DAILYAC  3/16/20 11:30


    7/24/20 07:26


40 MG


 


 Phenylephrine HCl


  (Ken-Synephrine


 Inj)  10 mg  STK-MED ONCE  6/30/20 13:33


 6/30/20 13:33 DC  





 


 Phenylephrine HCl


  (PHENYLEPHRINE


 in 0.9% NACL PF)  1 mg  STK-MED ONCE  6/30/20 14:44


 6/30/20 14:45 DC  





 


 Piperacillin Sod/


 Tazobactam Sod


 3.375 gm/Sodium


 Chloride  50 ml @ 


 100 mls/hr  Q6HRS  5/27/20 12:00


 6/4/20 07:26 DC 6/4/20 06:10


100 MLS/HR


 


 Piperacillin Sod/


 Tazobactam Sod


 4.5 gm/Sodium


 Chloride  100 ml @ 


 200 mls/hr  1X  ONCE  3/16/20 06:00


 3/16/20 06:29 DC 3/16/20 05:44


200 MLS/HR


 


 Potassium


 Chloride 110 meq/


 Magnesium Sulfate


 20 meq/


 Multivitamins 10


 ml/Chromium/


 Copper/Manganese/


 Seleni/Zn 1 ml/


 Insulin Human


 Regular 15 unit/


 Total Parenteral


 Nutrition/Amino


 Acids/Dextrose/


 Fat Emulsion


 Intravenous  1,800 ml @ 


 75 mls/hr  TPN  CONT  5/24/20 22:00


 5/25/20 21:59 DC 5/24/20 22:48


75 MLS/HR


 


 Potassium


 Chloride 15 meq/


 Bicarbonate


 Dialysis Soln w/


 out KCl  5,007.5 ml


  @ 1,000 mls/


 hr  Q5H1M  3/29/20 20:00


 4/2/20 13:08 DC 4/1/20 18:14


1,000 MLS/HR


 


 Potassium


 Chloride 20 meq/


 Bicarbonate


 Dialysis Soln w/


 out KCl  5,010 ml @ 


 1,000 mls/hr  Q5H1M  3/25/20 16:00


 3/29/20 19:59 DC 3/29/20 14:54


1,000 MLS/HR


 


 Potassium


 Chloride 40 meq/


 Potassium Acetate


 60 meq/Magnesium


 Sulfate 10 meq/


 Multivitamins 10


 ml/Chromium/


 Copper/Manganese/


 Seleni/Zn 1 ml/


 Insulin Human


 Regular 20 unit/


 Total Parenteral


 Nutrition/Amino


 Acids/Dextrose/


 Fat Emulsion


 Intravenous  1,800 ml @ 


 75 mls/hr  TPN  CONT  6/4/20 22:00


 6/5/20 21:59 DC 6/5/20 00:03


75 MLS/HR


 


 Potassium


 Chloride 70 meq/


 Magnesium Sulfate


 20 meq/


 Multivitamins 10


 ml/Chromium/


 Copper/Manganese/


 Seleni/Zn 1 ml/


 Insulin Human


 Regular 15 unit/


 Total Parenteral


 Nutrition/Amino


 Acids/Dextrose/


 Fat Emulsion


 Intravenous  1,800 ml @ 


 75 mls/hr  TPN  CONT  5/29/20 22:00


 5/30/20 21:59 DC 5/29/20 23:13


75 MLS/HR


 


 Potassium


 Chloride 75 meq/


 Magnesium Sulfate


 15 meq/


 Multivitamins 10


 ml/Chromium/


 Copper/Manganese/


 Seleni/Zn 0.5 ml/


 Insulin Human


 Regular 15 unit/


 Total Parenteral


 Nutrition/Amino


 Acids/Dextrose/


 Fat Emulsion


 Intravenous  1,920 ml @ 


 80 mls/hr  TPN  CONT  5/9/20 22:00


 5/10/20 21:59 DC 5/9/20 22:41


80 MLS/HR


 


 Potassium


 Chloride 75 meq/


 Magnesium Sulfate


 15 meq/Calcium


 Gluconate 8 meq/


 Multivitamins 10


 ml/Chromium/


 Copper/Manganese/


 Seleni/Zn 0.5 ml/


 Insulin Human


 Regular 15 unit/


 Total Parenteral


 Nutrition/Amino


 Acids/Dextrose/


 Fat Emulsion


 Intravenous  1,920 ml @ 


 80 mls/hr  TPN  CONT  5/7/20 22:00


 5/8/20 21:59 DC 5/7/20 22:28


80 MLS/HR


 


 Potassium


 Chloride 75 meq/


 Magnesium Sulfate


 15 meq/Calcium


 Gluconate 8 meq/


 Multivitamins 10


 ml/Chromium/


 Copper/Manganese/


 Seleni/Zn 0.5 ml/


 Insulin Human


 Regular 20 unit/


 Total Parenteral


 Nutrition/Amino


 Acids/Dextrose/


 Fat Emulsion


 Intravenous  1,920 ml @ 


 80 mls/hr  TPN  CONT  5/6/20 22:00


 5/7/20 21:59 DC 5/6/20 22:00


80 MLS/HR


 


 Potassium


 Chloride 75 meq/


 Magnesium Sulfate


 15 meq/Calcium


 Gluconate 8 meq/


 Multivitamins 10


 ml/Chromium/


 Copper/Manganese/


 Seleni/Zn 0.5 ml/


 Insulin Human


 Regular 25 unit/


 Total Parenteral


 Nutrition/Amino


 Acids/Dextrose/


 Fat Emulsion


 Intravenous  1,920 ml @ 


 80 mls/hr  TPN  CONT  5/4/20 22:00


 5/5/20 21:59 DC 5/4/20 23:08


80 MLS/HR


 


 Potassium


 Chloride 75 meq/


 Magnesium Sulfate


 20 meq/Calcium


 Gluconate 10 meq/


 Multivitamins 10


 ml/Chromium/


 Copper/Manganese/


 Seleni/Zn 0.5 ml/


 Insulin Human


 Regular 25 unit/


 Total Parenteral


 Nutrition/Amino


 Acids/Dextrose/


 Fat Emulsion


 Intravenous  1,920 ml @ 


 80 mls/hr  TPN  CONT  5/3/20 22:00


 5/4/20 21:59 DC 5/3/20 22:04


80 MLS/HR


 


 Potassium


 Chloride 75 meq/


 Magnesium Sulfate


 20 meq/Calcium


 Gluconate 10 meq/


 Multivitamins 10


 ml/Chromium/


 Copper/Manganese/


 Seleni/Zn 0.5 ml/


 Insulin Human


 Regular 30 unit/


 Total Parenteral


 Nutrition/Amino


 Acids/Dextrose/


 Fat Emulsion


 Intravenous  1,920 ml @ 


 80 mls/hr  TPN  CONT  5/2/20 22:00


 5/3/20 22:00 DC 5/2/20 21:51


80 MLS/HR


 


 Potassium


 Chloride 80 meq/


 Magnesium Sulfate


 20 meq/


 Multivitamins 10


 ml/Chromium/


 Copper/Manganese/


 Seleni/Zn 0.5 ml/


 Insulin Human


 Regular 15 unit/


 Total Parenteral


 Nutrition/Amino


 Acids/Dextrose/


 Fat Emulsion


 Intravenous  1,920 ml @ 


 80 mls/hr  TPN  CONT  5/12/20 22:00


 5/13/20 21:59 DC 5/12/20 21:40


80 MLS/HR


 


 Potassium


 Chloride 80 meq/


 Magnesium Sulfate


 20 meq/


 Multivitamins 10


 ml/Chromium/


 Copper/Manganese/


 Seleni/Zn 1 ml/


 Insulin Human


 Regular 15 unit/


 Total Parenteral


 Nutrition/Amino


 Acids/Dextrose/


 Fat Emulsion


 Intravenous  1,800 ml @ 


 75 mls/hr  TPN  CONT  5/31/20 22:00


 6/1/20 21:59 DC 5/31/20 21:54


75 MLS/HR


 


 Potassium


 Chloride 90 meq/


 Magnesium Sulfate


 20 meq/


 Multivitamins 10


 ml/Chromium/


 Copper/Manganese/


 Seleni/Zn 1 ml/


 Insulin Human


 Regular 15 unit/


 Total Parenteral


 Nutrition/Amino


 Acids/Dextrose/


 Fat Emulsion


 Intravenous  1,800 ml @ 


 75 mls/hr  TPN  CONT  5/20/20 22:00


 5/21/20 21:59 DC 5/20/20 22:28


75 MLS/HR


 


 Potassium


 Chloride 90 meq/


 Magnesium Sulfate


 20 meq/


 Multivitamins 10


 ml/Chromium/


 Copper/Manganese/


 Seleni/Zn 1 ml/


 Insulin Human


 Regular 20 unit/


 Total Parenteral


 Nutrition/Amino


 Acids/Dextrose/


 Fat Emulsion


 Intravenous  1,800 ml @ 


 75 mls/hr  TPN  CONT  6/3/20 22:00


 6/4/20 21:59 DC 6/3/20 23:13


75 MLS/HR


 


 Potassium


 Chloride/Water  100 ml @ 


 100 mls/hr  Q1H  6/17/20 08:00


 6/17/20 09:59 DC 6/17/20 09:12


100 MLS/HR


 


 Potassium


 Phosphate 20 mmol/


 Sodium Chloride  106.6667


 ml @ 


 51.667 m...  1X  ONCE  3/25/20 13:00


 3/25/20 15:03 DC 3/25/20 12:51


51.667 MLS/HR


 


 Potassium Acetate


 30 meq/Magnesium


 Sulfate 14 meq/


 Multivitamins 10


 ml/Chromium/


 Copper/Manganese/


 Seleni/Zn 1 ml/


 Insulin Human


 Regular 15 unit/


 Sodium Chloride


 20 meq/Potassium


 Chloride 30 meq/


 Total Parenteral


 Nutrition/Amino


 Acids/Dextrose/


 Fat Emulsion


 Intravenous  1,920 ml @ 


 80 mls/hr  TPN  CONT  6/23/20 22:00


 6/24/20 21:59 DC 6/23/20 21:46


80 MLS/HR


 


 Potassium Acetate


 30 meq/Magnesium


 Sulfate 20 meq/


 Calcium Gluconate


 10 meq/


 Multivitamins 10


 ml/Chromium/


 Copper/Manganese/


 Seleni/Zn 0.5 ml/


 Insulin Human


 Regular 30 unit/


 Potassium


 Chloride 30 meq/


 Total Parenteral


 Nutrition/Amino


 Acids/Dextrose/


 Fat Emulsion


 Intravenous  1,920 ml @ 


 80 mls/hr  TPN  CONT  5/1/20 22:00


 5/2/20 21:59 DC 5/1/20 22:34


80 MLS/HR


 


 Potassium Acetate


 40 meq/Magnesium


 Sulfate 10 meq/


 Multivitamins 10


 ml/Chromium/


 Copper/Manganese/


 Seleni/Zn 1 ml/


 Insulin Human


 Regular 20 unit/


 Total Parenteral


 Nutrition/Amino


 Acids/Dextrose/


 Fat Emulsion


 Intravenous  1,920 ml @ 


 80 mls/hr  TPN  CONT  6/16/20 22:00


 6/17/20 21:59 DC 6/16/20 21:32


80 MLS/HR


 


 Potassium Acetate


 40 meq/Magnesium


 Sulfate 5 meq/


 Multivitamins 10


 ml/Chromium/


 Copper/Manganese/


 Seleni/Zn 1 ml/


 Insulin Human


 Regular 30 unit/


 Total Parenteral


 Nutrition/Amino


 Acids/Dextrose/


 Fat Emulsion


 Intravenous  1,920 ml @ 


 80 mls/hr  TPN  CONT  6/15/20 22:00


 6/16/20 19:34 DC 6/15/20 21:54


80 MLS/HR


 


 Potassium Acetate


 55 meq/Magnesium


 Sulfate 20 meq/


 Calcium Gluconate


 10 meq/


 Multivitamins 10


 ml/Chromium/


 Copper/Manganese/


 Seleni/Zn 0.5 ml/


 Insulin Human


 Regular 30 unit/


 Total Parenteral


 Nutrition/Amino


 Acids/Dextrose/


 Fat Emulsion


 Intravenous  1,920 ml @ 


 80 mls/hr  TPN  CONT  4/30/20 22:00


 5/1/20 21:59 DC 5/1/20 01:00


80 MLS/HR


 


 Potassium Acetate


 55 meq/Magnesium


 Sulfate 20 meq/


 Calcium Gluconate


 10 meq/


 Multivitamins 10


 ml/Chromium/


 Copper/Manganese/


 Seleni/Zn 0.5 ml/


 Insulin Human


 Regular 35 unit/


 Total Parenteral


 Nutrition/Amino


 Acids/Dextrose/


 Fat Emulsion


 Intravenous  1,920 ml @ 


 80 mls/hr  TPN  CONT  4/28/20 22:00


 4/29/20 21:59 DC 4/28/20 22:02


80 MLS/HR


 


 Potassium Acetate


 60 meq/Magnesium


 Sulfate 10 meq/


 Multivitamins 10


 ml/Chromium/


 Copper/Manganese/


 Seleni/Zn 1 ml/


 Insulin Human


 Regular 20 unit/


 Total Parenteral


 Nutrition/Amino


 Acids/Dextrose/


 Fat Emulsion


 Intravenous  1,920 ml @ 


 80 mls/hr  TPN  CONT  6/17/20 22:00


 6/18/20 21:59 DC 6/17/20 21:55


80 MLS/HR


 


 Potassium Acetate


 60 meq/Magnesium


 Sulfate 14 meq/


 Multivitamins 10


 ml/Chromium/


 Copper/Manganese/


 Seleni/Zn 1 ml/


 Insulin Human


 Regular 15 unit/


 Sodium Chloride


 20 meq/Total


 Parenteral


 Nutrition/Amino


 Acids/Dextrose/


 Fat Emulsion


 Intravenous  1,920 ml @ 


 80 mls/hr  TPN  CONT  6/22/20 22:00


 6/23/20 21:59 DC 6/22/20 21:54


80 MLS/HR


 


 Potassium Acetate


 60 meq/Magnesium


 Sulfate 14 meq/


 Multivitamins 10


 ml/Chromium/


 Copper/Manganese/


 Seleni/Zn 1 ml/


 Insulin Human


 Regular 15 unit/


 Total Parenteral


 Nutrition/Amino


 Acids/Dextrose/


 Fat Emulsion


 Intravenous  1,920 ml @ 


 80 mls/hr  TPN  CONT  6/21/20 22:00


 6/22/20 21:59 DC 6/21/20 22:22


80 MLS/HR


 


 Potassium Acetate


 60 meq/Magnesium


 Sulfate 14 meq/


 Multivitamins 10


 ml/Chromium/


 Copper/Manganese/


 Seleni/Zn 1 ml/


 Insulin Human


 Regular 20 unit/


 Total Parenteral


 Nutrition/Amino


 Acids/Dextrose/


 Fat Emulsion


 Intravenous  1,920 ml @ 


 80 mls/hr  TPN  CONT  6/18/20 22:00


 6/19/20 21:59 DC 6/18/20 22:26


80 MLS/HR


 


 Potassium Acetate


 60 meq/Magnesium


 Sulfate 5 meq/


 Multivitamins 10


 ml/Chromium/


 Copper/Manganese/


 Seleni/Zn 1 ml/


 Insulin Human


 Regular 30 unit/


 Total Parenteral


 Nutrition/Amino


 Acids/Dextrose/


 Fat Emulsion


 Intravenous  1,920 ml @ 


 80 mls/hr  TPN  CONT  6/6/20 22:00


 6/7/20 21:59 DC 6/6/20 21:54


80 MLS/HR


 


 Potassium Acetate


 65 meq/Magnesium


 Sulfate 20 meq/


 Calcium Gluconate


 10 meq/


 Multivitamins 10


 ml/Chromium/


 Copper/Manganese/


 Seleni/Zn 0.5 ml/


 Insulin Human


 Regular 30 unit/


 Total Parenteral


 Nutrition/Amino


 Acids/Dextrose/


 Fat Emulsion


 Intravenous  1,920 ml @ 


 80 mls/hr  TPN  CONT  4/29/20 22:00


 4/30/20 21:59 DC 4/29/20 22:22


80 MLS/HR


 


 Potassium Acetate


 80 meq/Magnesium


 Sulfate 5 meq/


 Multivitamins 10


 ml/Chromium/


 Copper/Manganese/


 Seleni/Zn 1 ml/


 Insulin Human


 Regular 20 unit/


 Total Parenteral


 Nutrition/Amino


 Acids/Dextrose/


 Fat Emulsion


 Intravenous  1,920 ml @ 


 80 mls/hr  TPN  CONT  6/5/20 22:00


 6/6/20 21:59 DC 6/5/20 21:59


80 MLS/HR


 


 Prochlorperazine


 Edisylate


  (Compazine)  5 mg  PACU PRN  PRN  4/27/20 07:00


 4/28/20 06:59 DC  





 


 Propofol


  (Diprivan)  200 mg  STK-MED ONCE  6/30/20 07:44


 6/30/20 07:44 DC  





 


 Ringer's Solution  1,000 ml @ 


 30 mls/hr  Q24H  4/27/20 07:00


 4/27/20 18:59 DC  





 


 Rocuronium Bromide


  (Zemuron)  100 mg  STK-MED ONCE  6/30/20 07:44


 6/30/20 07:44 DC  





 


 Saliva Substitute


  (Biotene


 Moisturizing


 Mouth)  2 spray  PRN Q15MIN  PRN  5/21/20 11:00


     





 


 Sevoflurane


  (Ultane)  60 ml  STK-MED ONCE  4/27/20 12:26


 4/27/20 12:27 DC  





 


 Sodium


 Bicarbonate 150


 meq/Dextrose  1,150 ml @ 


 75 mls/hr  1X  ONCE  6/30/20 16:30


 7/1/20 07:49 DC 6/30/20 20:02


75 MLS/HR


 


 Sodium


 Bicarbonate 50


 meq/Sodium


 Chloride  1,050 ml @ 


 75 mls/hr  Q14H  3/18/20 07:30


 3/23/20 10:28 DC 3/22/20 21:10


75 MLS/HR


 


 Sodium Acetate 50


 meq/Potassium


 Acetate 55 meq/


 Magnesium Sulfate


 20 meq/Calcium


 Gluconate 10 meq/


 Multivitamins 10


 ml/Chromium/


 Copper/Manganese/


 Seleni/Zn 0.5 ml/


 Insulin Human


 Regular 35 unit/


 Total Parenteral


 Nutrition/Amino


 Acids/Dextrose/


 Fat Emulsion


 Intravenous  1,800 ml @ 


 75 mls/hr  TPN  CONT  4/25/20 22:00


 4/26/20 21:59 DC 4/25/20 22:03


75 MLS/HR


 


 Sodium Bicarbonate


  (Sodium Bicarb


 Adult 8.4% Syr)  100 meq  1X  ONCE  6/30/20 16:30


 6/30/20 16:31 DC 6/30/20 17:07


100 MEQ


 


 Sodium Chloride


  (Normal Saline


 Flush)  3 ml  QSHIFT  PRN  6/30/20 14:45


     





 


 Sodium Chloride


 80 meq/Potassium


 Chloride 30 meq/


 Potassium Acetate


 30 meq/Magnesium


 Sulfate 14 meq/


 Multivitamins 10


 ml/Chromium/


 Copper/Manganese/


 Seleni/Zn 1 ml/


 Insulin Human


 Regular 15 unit/


 Total Parenteral


 Nutrition/Amino


 Acids/Dextrose/


 Fat Emulsion


 Intravenous  1,920 ml @ 


 80 mls/hr  TPN  CONT  7/1/20 22:00


 7/2/20 21:59 DC 7/1/20 23:05


80 MLS/HR


 


 Sodium Chloride


 90 meq/Calcium


 Gluconate 10 meq/


 Multivitamins 10


 ml/Chromium/


 Copper/Manganese/


 Seleni/Zn 0.5 ml/


 Total Parenteral


 Nutrition/Amino


 Acids/Dextrose/


 Fat Emulsion


 Intravenous  1,512 ml @ 


 63 mls/hr  TPN  CONT  3/18/20 22:00


 3/19/20 21:59 DC 3/18/20 22:06


63 MLS/HR


 


 Sodium Chloride


 90 meq/Calcium


 Gluconate 10 meq/


 Multivitamins 10


 ml/Chromium/


 Copper/Manganese/


 Seleni/Zn 1 ml/


 Total Parenteral


 Nutrition/Amino


 Acids/Dextrose/


 Fat Emulsion


 Intravenous  55.005 ml


  @ 2.292


 mls/hr  TPN  CONT  3/18/20 22:00


 3/18/20 12:33 DC  





 


 Sodium Chloride


 90 meq/Magnesium


 Sulfate 10 meq/


 Calcium Gluconate


 20 meq/


 Multivitamins 10


 ml/Chromium/


 Copper/Manganese/


 Seleni/Zn 0.5 ml/


 Total Parenteral


 Nutrition/Amino


 Acids/Dextrose/


 Fat Emulsion


 Intravenous  1,512 ml @ 


 63 mls/hr  TPN  CONT  3/19/20 22:00


 3/20/20 21:59 DC 3/19/20 22:25


63 MLS/HR


 


 Sodium Chloride


 90 meq/Magnesium


 Sulfate 12 meq/


 Calcium Gluconate


 15 meq/


 Multivitamins 10


 ml/Chromium/


 Copper/Manganese/


 Seleni/Zn 0.5 ml/


 Insulin Human


 Regular 25 unit/


 Total Parenteral


 Nutrition/Amino


 Acids/Dextrose/


 Fat Emulsion


 Intravenous  1,400 ml @ 


 58.333 mls/


 hr  TPN  CONT  4/8/20 22:00


 4/9/20 21:59 DC 4/8/20 21:41


58.333 MLS/HR


 


 Sodium Chloride


 90 meq/Potassium


 Chloride 15 meq/


 Magnesium Sulfate


 12 meq/Calcium


 Gluconate 15 meq/


 Multivitamins 10


 ml/Chromium/


 Copper/Manganese/


 Seleni/Zn 0.5 ml/


 Insulin Human


 Regular 25 unit/


 Total Parenteral


 Nutrition/Amino


 Acids/Dextrose/


 Fat Emulsion


 Intravenous  1,400 ml @ 


 58.333 mls/


 hr  TPN  CONT  4/7/20 22:00


 4/8/20 21:59 DC 4/7/20 22:13


58.333 MLS/HR


 


 Sodium Chloride


 90 meq/Potassium


 Chloride 15 meq/


 Potassium


 Phosphate 10 mmol/


 Magnesium Sulfate


 8 meq/Calcium


 Gluconate 15 meq/


 Multivitamins 10


 ml/Chromium/


 Copper/Manganese/


 Seleni/Zn 0.5 ml/


 Insulin Human


 Regular 25 unit/


 Total Parenteral


 Nutrition/Amino


 Acids/Dextrose/


 Fat Emulsion


 Intravenous  1,400 ml @ 


 58.333 mls/


 hr  TPN  CONT  4/5/20 22:00


 4/6/20 21:59 DC 4/5/20 21:20


58.333 MLS/HR


 


 Sodium Chloride


 90 meq/Potassium


 Chloride 15 meq/


 Potassium


 Phosphate 10 mmol/


 Magnesium Sulfate


 10 meq/Calcium


 Gluconate 20 meq/


 Multivitamins 10


 ml/Chromium/


 Copper/Manganese/


 Seleni/Zn 0.5 ml/


 Total Parenteral


 Nutrition/Amino


 Acids/Dextrose/


 Fat Emulsion


 Intravenous  1,400 ml @ 


 58.333 mls/


 hr  TPN  CONT  3/23/20 22:00


 3/24/20 21:59 DC 3/23/20 21:42


58.333 MLS/HR


 


 Sodium Chloride


 90 meq/Potassium


 Chloride 15 meq/


 Potassium


 Phosphate 10 mmol/


 Magnesium Sulfate


 12 meq/Calcium


 Gluconate 15 meq/


 Multivitamins 10


 ml/Chromium/


 Copper/Manganese/


 Seleni/Zn 0.5 ml/


 Insulin Human


 Regular 25 unit/


 Total Parenteral


 Nutrition/Amino


 Acids/Dextrose/


 Fat Emulsion


 Intravenous  1,400 ml @ 


 58.333 mls/


 hr  TPN  CONT  4/6/20 22:00


 4/7/20 21:59 DC 4/6/20 22:24


58.333 MLS/HR


 


 Sodium Chloride


 90 meq/Potassium


 Chloride 15 meq/


 Potassium


 Phosphate 15 mmol/


 Magnesium Sulfate


 10 meq/Calcium


 Gluconate 15 meq/


 Multivitamins 10


 ml/Chromium/


 Copper/Manganese/


 Seleni/Zn 0.5 ml/


 Total Parenteral


 Nutrition/Amino


 Acids/Dextrose/


 Fat Emulsion


 Intravenous  1,400 ml @ 


 58.333 mls/


 hr  TPN  CONT  3/24/20 22:00


 3/25/20 21:59 DC 3/24/20 22:17


58.333 MLS/HR


 


 Sodium Chloride


 90 meq/Potassium


 Chloride 15 meq/


 Potassium


 Phosphate 15 mmol/


 Magnesium Sulfate


 10 meq/Calcium


 Gluconate 20 meq/


 Multivitamins 10


 ml/Chromium/


 Copper/Manganese/


 Seleni/Zn 0.5 ml/


 Total Parenteral


 Nutrition/Amino


 Acids/Dextrose/


 Fat Emulsion


 Intravenous  1,200 ml @ 


 50 mls/hr  TPN  CONT  3/22/20 22:00


 3/22/20 14:17 DC  





 


 Sodium Chloride


 90 meq/Potassium


 Chloride 15 meq/


 Potassium


 Phosphate 18 mmol/


 Magnesium Sulfate


 8 meq/Calcium


 Gluconate 15 meq/


 Multivitamins 10


 ml/Chromium/


 Copper/Manganese/


 Seleni/Zn 0.5 ml/


 Insulin Human


 Regular 10 unit/


 Total Parenteral


 Nutrition/Amino


 Acids/Dextrose/


 Fat Emulsion


 Intravenous  1,400 ml @ 


 58.333 mls/


 hr  TPN  CONT  3/27/20 22:00


 3/28/20 21:59 DC 3/27/20 21:43


58.333 MLS/HR


 


 Sodium Chloride


 90 meq/Potassium


 Chloride 15 meq/


 Potassium


 Phosphate 18 mmol/


 Magnesium Sulfate


 8 meq/Calcium


 Gluconate 15 meq/


 Multivitamins 10


 ml/Chromium/


 Copper/Manganese/


 Seleni/Zn 0.5 ml/


 Insulin Human


 Regular 15 unit/


 Total Parenteral


 Nutrition/Amino


 Acids/Dextrose/


 Fat Emulsion


 Intravenous  1,400 ml @ 


 58.333 mls/


 hr  TPN  CONT  3/30/20 22:00


 3/31/20 21:59 DC 3/30/20 21:47


58.333 MLS/HR


 


 Sodium Chloride


 90 meq/Potassium


 Chloride 15 meq/


 Potassium


 Phosphate 18 mmol/


 Magnesium Sulfate


 8 meq/Calcium


 Gluconate 15 meq/


 Multivitamins 10


 ml/Chromium/


 Copper/Manganese/


 Seleni/Zn 0.5 ml/


 Insulin Human


 Regular 20 unit/


 Total Parenteral


 Nutrition/Amino


 Acids/Dextrose/


 Fat Emulsion


 Intravenous  1,400 ml @ 


 58.333 mls/


 hr  TPN  CONT  4/2/20 22:00


 4/3/20 21:59 DC 4/2/20 22:45


58.333 MLS/HR


 


 Sodium Chloride


 90 meq/Potassium


 Chloride 15 meq/


 Potassium


 Phosphate 18 mmol/


 Magnesium Sulfate


 8 meq/Calcium


 Gluconate 15 meq/


 Multivitamins 10


 ml/Chromium/


 Copper/Manganese/


 Seleni/Zn 0.5 ml/


 Total Parenteral


 Nutrition/Amino


 Acids/Dextrose/


 Fat Emulsion


 Intravenous  1,400 ml @ 


 58.333 mls/


 hr  TPN  CONT  3/26/20 22:00


 3/27/20 21:59 DC 3/26/20 22:00


58.333 MLS/HR


 


 Sodium Chloride


 90 meq/Potassium


 Chloride 30 meq/


 Potassium Acetate


 30 meq/Magnesium


 Sulfate 15 meq/


 Multivitamins 10


 ml/Chromium/


 Copper/Manganese/


 Seleni/Zn 1 ml/


 Insulin Human


 Regular 15 unit/


 Total Parenteral


 Nutrition/Amino


 Acids/Dextrose/


 Fat Emulsion


 Intravenous  1,680 ml @ 


 70 mls/hr  TPN  CONT  7/16/20 22:00


 7/17/20 21:59 DC 7/16/20 22:06


70 MLS/HR


 


 Sodium Chloride


 90 meq/Potassium


 Phosphate 15 mmol/


 Magnesium Sulfate


 12 meq/Calcium


 Gluconate 15 meq/


 Multivitamins 10


 ml/Chromium/


 Copper/Manganese/


 Seleni/Zn 0.5 ml/


 Insulin Human


 Regular 30 unit/


 Total Parenteral


 Nutrition/Amino


 Acids/Dextrose/


 Fat Emulsion


 Intravenous  1,400 ml @ 


 58.333 mls/


 hr  TPN  CONT  4/10/20 22:00


 4/11/20 21:59 DC 4/10/20 21:49


58.333 MLS/HR


 


 Sodium Chloride


 90 meq/Potassium


 Phosphate 15 mmol/


 Magnesium Sulfate


 12 meq/Calcium


 Gluconate 15 meq/


 Multivitamins 10


 ml/Chromium/


 Copper/Manganese/


 Seleni/Zn 0.5 ml/


 Insulin Human


 Regular 40 unit/


 Total Parenteral


 Nutrition/Amino


 Acids/Dextrose/


 Fat Emulsion


 Intravenous  1,400 ml @ 


 58.333 mls/


 hr  TPN  CONT  4/11/20 22:00


 4/12/20 21:59 DC 4/11/20 21:21


58.333 MLS/HR


 


 Sodium Chloride


 90 meq/Potassium


 Phosphate 19 mmol/


 Magnesium Sulfate


 12 meq/Calcium


 Gluconate 15 meq/


 Multivitamins 10


 ml/Chromium/


 Copper/Manganese/


 Seleni/Zn 0.5 ml/


 Insulin Human


 Regular 40 unit/


 Total Parenteral


 Nutrition/Amino


 Acids/Dextrose/


 Fat Emulsion


 Intravenous  1,400 ml @ 


 58.333 mls/


 hr  TPN  CONT  4/12/20 22:00


 4/13/20 21:59 DC 4/12/20 21:54


58.333 MLS/HR


 


 Sodium Chloride


 90 meq/Potassium


 Phosphate 5 mmol/


 Magnesium Sulfate


 12 meq/Calcium


 Gluconate 15 meq/


 Multivitamins 10


 ml/Chromium/


 Copper/Manganese/


 Seleni/Zn 0.5 ml/


 Insulin Human


 Regular 30 unit/


 Total Parenteral


 Nutrition/Amino


 Acids/Dextrose/


 Fat Emulsion


 Intravenous  1,400 ml @ 


 58.333 mls/


 hr  TPN  CONT  4/9/20 22:00


 4/10/20 21:59 DC 4/9/20 22:08


58.333 MLS/HR


 


 Sodium Chloride


 100 meq/Potassium


 Chloride 30 meq/


 Potassium Acetate


 30 meq/Magnesium


 Sulfate 12 meq/


 Multivitamins 10


 ml/Chromium/


 Copper/Manganese/


 Seleni/Zn 1 ml/


 Insulin Human


 Regular 15 unit/


 Total Parenteral


 Nutrition/Amino


 Acids/Dextrose/


 Fat Emulsion


 Intravenous  1,680 ml @ 


 70 mls/hr  TPN  CONT  7/5/20 22:00


 7/6/20 21:59 DC 7/5/20 21:23


70 MLS/HR


 


 Sodium Chloride


 100 meq/Potassium


 Chloride 40 meq/


 Magnesium Sulfate


 15 meq/Calcium


 Gluconate 15 meq/


 Multivitamins 10


 ml/Chromium/


 Copper/Manganese/


 Seleni/Zn 0.5 ml/


 Insulin Human


 Regular 35 unit/


 Total Parenteral


 Nutrition/Amino


 Acids/Dextrose/


 Fat Emulsion


 Intravenous  1,400 ml @ 


 58.333 mls/


 hr  TPN  CONT  4/19/20 22:00


 4/20/20 21:59 DC 4/19/20 22:46


58.333 MLS/HR


 


 Sodium Chloride


 100 meq/Potassium


 Chloride 40 meq/


 Magnesium Sulfate


 20 meq/Calcium


 Gluconate 10 meq/


 Multivitamins 10


 ml/Chromium/


 Copper/Manganese/


 Seleni/Zn 0.5 ml/


 Insulin Human


 Regular 35 unit/


 Total Parenteral


 Nutrition/Amino


 Acids/Dextrose/


 Fat Emulsion


 Intravenous  1,400 ml @ 


 58.333 mls/


 hr  TPN  CONT  4/23/20 22:00


 4/24/20 21:59 DC 4/24/20 00:06


58.333 MLS/HR


 


 Sodium Chloride


 100 meq/Potassium


 Chloride 40 meq/


 Magnesium Sulfate


 20 meq/Calcium


 Gluconate 15 meq/


 Multivitamins 10


 ml/Chromium/


 Copper/Manganese/


 Seleni/Zn 0.5 ml/


 Insulin Human


 Regular 35 unit/


 Total Parenteral


 Nutrition/Amino


 Acids/Dextrose/


 Fat Emulsion


 Intravenous  1,400 ml @ 


 58.333 mls/


 hr  TPN  CONT  4/22/20 22:00


 4/23/20 21:59 DC 4/22/20 22:27


58.333 MLS/HR


 


 Sodium Chloride


 100 meq/Potassium


 Phosphate 10 mmol/


 Magnesium Sulfate


 12 meq/Calcium


 Gluconate 15 meq/


 Multivitamins 10


 ml/Chromium/


 Copper/Manganese/


 Seleni/Zn 0.5 ml/


 Insulin Human


 Regular 35 unit/


 Potassium


 Chloride 20 meq/


 Total Parenteral


 Nutrition/Amino


 Acids/Dextrose/


 Fat Emulsion


 Intravenous  1,400 ml @ 


 58.333 mls/


 hr  TPN  CONT  4/16/20 22:00


 4/17/20 21:59 DC 4/16/20 22:10


58.333 MLS/HR


 


 Sodium Chloride


 100 meq/Potassium


 Phosphate 19 mmol/


 Magnesium Sulfate


 12 meq/Calcium


 Gluconate 15 meq/


 Multivitamins 10


 ml/Chromium/


 Copper/Manganese/


 Seleni/Zn 0.5 ml/


 Insulin Human


 Regular 40 unit/


 Potassium


 Chloride 20 meq/


 Total Parenteral


 Nutrition/Amino


 Acids/Dextrose/


 Fat Emulsion


 Intravenous  1,400 ml @ 


 58.333 mls/


 hr  TPN  CONT  4/15/20 22:00


 4/16/20 21:59 DC 4/15/20 21:20


58.333 MLS/HR


 


 Sodium Chloride


 100 meq/Potassium


 Phosphate 5 mmol/


 Magnesium Sulfate


 12 meq/Calcium


 Gluconate 15 meq/


 Multivitamins 10


 ml/Chromium/


 Copper/Manganese/


 Seleni/Zn 0.5 ml/


 Insulin Human


 Regular 35 unit/


 Potassium


 Chloride 20 meq/


 Total Parenteral


 Nutrition/Amino


 Acids/Dextrose/


 Fat Emulsion


 Intravenous  1,400 ml @ 


 58.333 mls/


 hr  TPN  CONT  4/17/20 22:00


 4/18/20 21:59 DC 4/17/20 22:59


58.333 MLS/HR


 


 Sodium Chloride


 110 meq/Potassium


 Chloride 30 meq/


 Potassium Acetate


 30 meq/Magnesium


 Sulfate 15 meq/


 Multivitamins 10


 ml/Chromium/


 Copper/Manganese/


 Seleni/Zn 1 ml/


 Insulin Human


 Regular 15 unit/


 Total Parenteral


 Nutrition/Amino


 Acids/Dextrose/


 Fat Emulsion


 Intravenous  1,680 ml @ 


 70 mls/hr  TPN  CONT  7/18/20 22:00


 7/19/20 21:59 DC 7/18/20 22:01


70 MLS/HR


 


 Sodium Chloride


 110 meq/Sodium


 Phosphate 10 mmol/


 Potassium


 Chloride 30 meq/


 Potassium Acetate


 30 meq/Magnesium


 Sulfate 15 meq/


 Multivitamins 10


 ml/Chromium/


 Copper/Manganese/


 Seleni/Zn 1 ml/


 Insulin Human


 Regular 15 unit/


 Total Parenteral


 Nutrition/Amino


 Acids/Dextrose/


 Fat Emulsion


 Intravenous  1,680 ml @ 


 70 mls/hr  TPN  CONT  7/19/20 22:00


 7/20/20 21:59 DC 7/19/20 22:03


70 MLS/HR


 


 Sodium Chloride


 120 meq/Sodium


 Phosphate 10 mmol/


 Potassium


 Chloride 30 meq/


 Potassium Acetate


 30 meq/Magnesium


 Sulfate 15 meq/


 Multivitamins 10


 ml/Chromium/


 Copper/Manganese/


 Seleni/Zn 1 ml/


 Insulin Human


 Regular 15 unit/


 Total Parenteral


 Nutrition/Amino


 Acids/Dextrose/


 Fat Emulsion


 Intravenous  1,680 ml @ 


 70 mls/hr  TPN  CONT  7/23/20 22:00


 7/24/20 21:59  7/23/20 22:35


70 MLS/HR


 


 Succinylcholine


 Chloride


  (Anectine)  120 mg  1X  ONCE  3/23/20 08:30


 3/23/20 08:31 DC 3/23/20 08:34


120 MG


 


 Vancomycin HCl


  (Vanco Per


 Pharmacy)  1 each  PRN DAILY  PRN  7/12/20 09:15


 7/15/20 07:41 DC 7/14/20 02:46


1 EACH


 


 Vancomycin HCl


  (Vancomycin


 Random Level)  1 each  1X  ONCE  7/14/20 01:00


 7/14/20 01:01 DC 7/14/20 01:00


1 EACH


 


 Vancomycin HCl


  (Vancomycin


 Trough Level)  1 each  1X  ONCE  7/15/20 09:30


 7/15/20 09:31 Cancel  





 


 Vancomycin HCl


 1.5 gm/Sodium


 Chloride  500 ml @ 


 250 mls/hr  Q12H  7/14/20 10:00


 7/15/20 07:41 DC 7/14/20 22:07


250 MLS/HR


 


 Vancomycin HCl 2


 gm/Sodium Chloride  500 ml @ 


 250 mls/hr  1X  ONCE  7/12/20 10:00


 7/12/20 11:59 DC 7/12/20 10:34


250 MLS/HR


 


 Vasopressin


  (Vasostrict)  20 unit  STK-MED ONCE  6/30/20 12:23


 6/30/20 12:23 DC  





 


 Vasopressin 20


 unit/Dextrose  101 ml @ 


 12 mls/hr  CONT  PRN  6/30/20 15:30


    7/7/20 04:17


12 MLS/HR


 


 Vecuronium Bromide


  (Norcuron Bolus)  6 mg  PRN Q6HRS  PRN  5/7/20 19:15


 5/7/20 19:35 DC  














Labs:


Lab





Laboratory Tests








Test


 7/23/20


09:00 7/23/20


12:26 7/23/20


18:12 7/24/20


00:02


 


White Blood Count


 18.4 x10^3/uL


(4.0-11.0) 


 


 





 


Red Blood Count


 2.92 x10^6/uL


(3.50-5.40) 


 


 





 


Hemoglobin


 8.2 g/dL


(12.0-15.5) 


 


 





 


Hematocrit


 25.0 %


(36.0-47.0) 


 


 





 


Mean Corpuscular Volume 86 fL ()    


 


Mean Corpuscular Hemoglobin 28 pg (25-35)    


 


Mean Corpuscular Hemoglobin


Concent 33 g/dL


(31-37) 


 


 





 


Red Cell Distribution Width


 15.6 %


(11.5-14.5) 


 


 





 


Platelet Count


 532 x10^3/uL


(140-400) 


 


 





 


Neutrophils (%) (Auto) 85 % (31-73)    


 


Lymphocytes (%) (Auto) 6 % (24-48)    


 


Monocytes (%) (Auto) 5 % (0-9)    


 


Eosinophils (%) (Auto) 3 % (0-3)    


 


Basophils (%) (Auto) 1 % (0-3)    


 


Neutrophils # (Auto)


 15.7 x10^3/uL


(1.8-7.7) 


 


 





 


Lymphocytes # (Auto)


 1.2 x10^3/uL


(1.0-4.8) 


 


 





 


Monocytes # (Auto)


 0.9 x10^3/uL


(0.0-1.1) 


 


 





 


Eosinophils # (Auto)


 0.6 x10^3/uL


(0.0-0.7) 


 


 





 


Basophils # (Auto)


 0.1 x10^3/uL


(0.0-0.2) 


 


 





 


Glucose (Fingerstick)


 


 143 mg/dL


(70-99) 126 mg/dL


(70-99) 138 mg/dL


(70-99)


 


Test


 7/24/20


06:00 7/24/20


06:21 7/24/20


07:30 





 


White Blood Count


 16.9 x10^3/uL


(4.0-11.0) 


 


 





 


Red Blood Count


 2.66 x10^6/uL


(3.50-5.40) 


 


 





 


Hemoglobin


 7.9 g/dL


(12.0-15.5) 


 


 





 


Hematocrit


 24.3 %


(36.0-47.0) 


 


 





 


Mean Corpuscular Volume 91 fL ()    


 


Mean Corpuscular Hemoglobin 30 pg (25-35)    


 


Mean Corpuscular Hemoglobin


Concent 32 g/dL


(31-37) 


 


 





 


Red Cell Distribution Width


 16.7 %


(11.5-14.5) 


 


 





 


Platelet Count


 569 x10^3/uL


(140-400) 


 


 





 


Neutrophils (%) (Auto) 83 % (31-73)    


 


Lymphocytes (%) (Auto) 9 % (24-48)    


 


Monocytes (%) (Auto) 6 % (0-9)    


 


Eosinophils (%) (Auto) 2 % (0-3)    


 


Basophils (%) (Auto) 1 % (0-3)    


 


Neutrophils # (Auto)


 14.1 x10^3/uL


(1.8-7.7) 


 


 





 


Lymphocytes # (Auto)


 1.5 x10^3/uL


(1.0-4.8) 


 


 





 


Monocytes # (Auto)


 1.0 x10^3/uL


(0.0-1.1) 


 


 





 


Eosinophils # (Auto)


 0.3 x10^3/uL


(0.0-0.7) 


 


 





 


Basophils # (Auto)


 0.1 x10^3/uL


(0.0-0.2) 


 


 





 


Glucose (Fingerstick)


 


 150 mg/dL


(70-99) 


 





 


Sodium Level


 


 


 136 mmol/L


(136-145) 





 


Potassium Level


 


 


 4.2 mmol/L


(3.5-5.1) 





 


Chloride Level


 


 


 103 mmol/L


() 





 


Carbon Dioxide Level


 


 


 26 mmol/L


(21-32) 





 


Anion Gap   7 (6-14)  


 


Blood Urea Nitrogen


 


 


 13 mg/dL


(7-20) 





 


Creatinine


 


 


 0.6 mg/dL


(0.6-1.0) 





 


Estimated GFR


(Cockcroft-Gault) 


 


 106.3 


 





 


BUN/Creatinine Ratio   22 (6-20)  


 


Glucose Level


 


 


 125 mg/dL


(70-99) 





 


Calcium Level


 


 


 9.4 mg/dL


(8.5-10.1) 





 


Total Bilirubin


 


 


 0.5 mg/dL


(0.2-1.0) 





 


Aspartate Amino Transf


(AST/SGOT) 


 


 16 U/L (15-37) 


 





 


Alanine Aminotransferase


(ALT/SGPT) 


 


 15 U/L (14-59) 


 





 


Alkaline Phosphatase


 


 


 129 U/L


() 





 


Total Protein


 


 


 5.1 g/dL


(6.4-8.2) 





 


Albumin


 


 


 1.1 g/dL


(3.4-5.0) 





 


Albumin/Globulin Ratio   0.3 (1.0-1.7)  








Micro


        NEG ANSON 56


        PSEUDOMONAS AERUGINOSA


        ANTIBIOTIC                        RESULT          INTERPRETATION


        AMIKACIN                          <=16                  S


        AZTREONAM                         >16                   R


        CEFTAZIDIME                       >16                   R


        CIPROFLOXACIN                     <=0.25                S


        CEFEPIME                          16                    I


        GENTAMICIN                        <=2                   S


        LEVOFLOXACIN                      <=0.5                 S














                               ** CONTINUED ON NEXT PAGE **





---------------------------------------------------------------------

-----------------------





RUN DATE: 06/10/20                  [facility] *LIVE*               

  PAGE 2   


RUN TIME: 1121                            Specimen Inquiry                    


-----------------------------------------------------------

---------------------------------





SPEC: [id number]    PATIENT: JESENIA BEAN                [id number]  

(Continued)


----

--------------------------------------------------------------------------------


--------








-------------------------------------

-------------------------------------------------------





  Procedure                         Result                                      

         


 

--------------------------------------------------------------------------------


------------





  ANTIMICROBIAL SUSCEPTIBILITY  Preliminary   (continued)


        MEROPENEM                         <=1                   S


        PIPERACILLIN/TAZOBACTAM           64                    S


        TOBRAMYCIN                        <=2                   S


        Unless otherwise specified, Testing Performed by:


        [address] [address]


        For Inquires, the Physician may contact the Microbiology


        department at [phone]





-----------------

---------------------------------------------------------------------------





Objective:


Assessment:


Patient with prolonged hospitalization more than 4 months


Multiple medical problems


Multiple surgical procedures





S/P Exp. 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 

parapsilosis & PSAE (I-merrem/ceftazidime/AZT/cefepime))


Leucocytosis -trending upward 


Fever 


Acute gallstone pancreatitis with persistent necrosis


  - 4/9.  CT A/P Increased ascites. Persistent evidence of necrotizing 

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


  - 4/27. status post ROBERT drain placement; C. parapsilosis. s/p drain 5/6 + yeast

& high amylase; s/p additional drain on 5/8. Drains removed. 


  -5/6. fluid  candida parapsilosis fluid, amylase high


  - 6/6 showed multiple pseudocysts, slight larger on the right. s/p drains x 3,

6/7.  + PSAE (MDRO-R Cefepime, Zosyn ANSON < 64) and yeast, 


  -6/7 s/p drain replacement x 3; fluid cult PSAE (MDRO), yeast; treated


  -7/12 CT A/P shows smaller fluid collections.  


  -722 CT abdomen and pelvis drains in place       


Ascites s/p paracentesis 4/15 & 5/6. C. parapsilosis 


Cholelithiasis with thickening of the gallbladder wall.


JED, Hyperkalemia, Metabolic acidosis off dialysis


Acute hypoxic resp failure. trach/vent. sputum 6/13  + PSAE (I merrem) ; sputum 

culture July 19+ for PSAE R Merrem, sensitive to cefepime


Pleural effusion status post CTS left side


 Abdominal fluid culture 6 /7 MDRO Pseudomonas, yeast


Sputum culture positive 7/19 for MDRO Pseudomonas


Chest tube fluid positive for 7/21 Candida Parapsilosis








Generalized debility





Plan:


Plan of Care


Continue Avycaz /micafungin


DC dapto


Follow-up C. difficile PCR


BC from 7/15 neg to date 


Monitor WBC/temp 


Follow cultures


Wound care /drain management as directed


Contact isolation for CRE/MDRO








Critically ill





Long term prognosis  poor





D/w nursing











IVAN FRANZ MD           Jul 24, 2020 08:38

## 2020-07-24 NOTE — NUR
SS following up with discharge planning. SS reviewed pt chart and discussed with pt RN. Pt 
on room air. Pt had chest tube removed today. Pt has G tube, ROBERT drains x2, and 1 Avi 
drain. Pt has wound vac on midline incision. Pt on TPN and IV Micafungin and Avycaz. Pt wore 
speaking valve today. SS will continue to follow for discharge planning.

## 2020-07-24 NOTE — PDOC
SURGICAL PROGRESS NOTE


Subjective


up in chair


Vital Signs





Vital Signs








  Date Time  Temp Pulse Resp B/P (MAP) Pulse Ox O2 Delivery O2 Flow Rate FiO2


 


7/24/20 12:00 97.9 125 38 124/94 (104) 98 Room Air  





 97.9       


 


7/24/20 11:26       2.0 








I&O











Intake and Output 


 


 7/24/20





 07:00


 


Intake Total 2435 ml


 


Output Total 2870 ml


 


Balance -435 ml


 


 


 


IV Total 2085 ml


 


Tube Feeding 0 ml


 


Other 350 ml


 


Output Urine Total 1550 ml


 


Chest Tube Drainage Total 200 ml


 


Drainage Total 1120 ml








PATIENT HAS A VILLASENOR:  Yes


General:  Cooperative, No acute distress


Abdomen:  Soft, Other (mulitple drains )


Labs





Laboratory Tests








Test


 7/22/20


13:32 7/22/20


17:43 7/23/20


01:25 7/23/20


06:00


 


Glucose (Fingerstick)


 144 mg/dL


(70-99) 134 mg/dL


(70-99) 132 mg/dL


(70-99) 





 


Sodium Level


 


 


 


 134 mmol/L


(136-145)


 


Potassium Level


 


 


 


 4.4 mmol/L


(3.5-5.1)


 


Chloride Level


 


 


 


 102 mmol/L


()


 


Carbon Dioxide Level


 


 


 


 28 mmol/L


(21-32)


 


Anion Gap    4 (6-14) 


 


Blood Urea Nitrogen


 


 


 


 14 mg/dL


(7-20)


 


Creatinine


 


 


 


 0.5 mg/dL


(0.6-1.0)


 


Estimated GFR


(Cockcroft-Gault) 


 


 


 131.1 





 


Glucose Level


 


 


 


 121 mg/dL


(70-99)


 


Calcium Level


 


 


 


 9.7 mg/dL


(8.5-10.1)


 


Phosphorus Level


 


 


 


 4.1 mg/dL


(2.6-4.7)


 


Magnesium Level


 


 


 


 1.9 mg/dL


(1.8-2.4)


 


Triglycerides Level


 


 


 


 181 mg/dL


(0-150)


 


Test


 7/23/20


06:04 7/23/20


09:00 7/23/20


12:26 7/23/20


18:12


 


Glucose (Fingerstick)


 117 mg/dL


(70-99) 


 143 mg/dL


(70-99) 126 mg/dL


(70-99)


 


White Blood Count


 


 18.4 x10^3/uL


(4.0-11.0) 


 





 


Red Blood Count


 


 2.92 x10^6/uL


(3.50-5.40) 


 





 


Hemoglobin


 


 8.2 g/dL


(12.0-15.5) 


 





 


Hematocrit


 


 25.0 %


(36.0-47.0) 


 





 


Mean Corpuscular Volume  86 fL ()   


 


Mean Corpuscular Hemoglobin  28 pg (25-35)   


 


Mean Corpuscular Hemoglobin


Concent 


 33 g/dL


(31-37) 


 





 


Red Cell Distribution Width


 


 15.6 %


(11.5-14.5) 


 





 


Platelet Count


 


 532 x10^3/uL


(140-400) 


 





 


Neutrophils (%) (Auto)  85 % (31-73)   


 


Lymphocytes (%) (Auto)  6 % (24-48)   


 


Monocytes (%) (Auto)  5 % (0-9)   


 


Eosinophils (%) (Auto)  3 % (0-3)   


 


Basophils (%) (Auto)  1 % (0-3)   


 


Neutrophils # (Auto)


 


 15.7 x10^3/uL


(1.8-7.7) 


 





 


Lymphocytes # (Auto)


 


 1.2 x10^3/uL


(1.0-4.8) 


 





 


Monocytes # (Auto)


 


 0.9 x10^3/uL


(0.0-1.1) 


 





 


Eosinophils # (Auto)


 


 0.6 x10^3/uL


(0.0-0.7) 


 





 


Basophils # (Auto)


 


 0.1 x10^3/uL


(0.0-0.2) 


 





 


Test


 7/24/20


00:02 7/24/20


06:00 7/24/20


06:21 7/24/20


07:30


 


Glucose (Fingerstick)


 138 mg/dL


(70-99) 


 150 mg/dL


(70-99) 





 


White Blood Count


 


 16.9 x10^3/uL


(4.0-11.0) 


 





 


Red Blood Count


 


 2.66 x10^6/uL


(3.50-5.40) 


 





 


Hemoglobin


 


 7.9 g/dL


(12.0-15.5) 


 





 


Hematocrit


 


 24.3 %


(36.0-47.0) 


 





 


Mean Corpuscular Volume  91 fL ()   


 


Mean Corpuscular Hemoglobin  30 pg (25-35)   


 


Mean Corpuscular Hemoglobin


Concent 


 32 g/dL


(31-37) 


 





 


Red Cell Distribution Width


 


 16.7 %


(11.5-14.5) 


 





 


Platelet Count


 


 569 x10^3/uL


(140-400) 


 





 


Neutrophils (%) (Auto)  83 % (31-73)   


 


Lymphocytes (%) (Auto)  9 % (24-48)   


 


Monocytes (%) (Auto)  6 % (0-9)   


 


Eosinophils (%) (Auto)  2 % (0-3)   


 


Basophils (%) (Auto)  1 % (0-3)   


 


Neutrophils # (Auto)


 


 14.1 x10^3/uL


(1.8-7.7) 


 





 


Lymphocytes # (Auto)


 


 1.5 x10^3/uL


(1.0-4.8) 


 





 


Monocytes # (Auto)


 


 1.0 x10^3/uL


(0.0-1.1) 


 





 


Eosinophils # (Auto)


 


 0.3 x10^3/uL


(0.0-0.7) 


 





 


Basophils # (Auto)


 


 0.1 x10^3/uL


(0.0-0.2) 


 





 


Sodium Level


 


 


 


 136 mmol/L


(136-145)


 


Potassium Level


 


 


 


 4.2 mmol/L


(3.5-5.1)


 


Chloride Level


 


 


 


 103 mmol/L


()


 


Carbon Dioxide Level


 


 


 


 26 mmol/L


(21-32)


 


Anion Gap    7 (6-14) 


 


Blood Urea Nitrogen


 


 


 


 13 mg/dL


(7-20)


 


Creatinine


 


 


 


 0.6 mg/dL


(0.6-1.0)


 


Estimated GFR


(Cockcroft-Gault) 


 


 


 106.3 





 


BUN/Creatinine Ratio    22 (6-20) 


 


Glucose Level


 


 


 


 125 mg/dL


(70-99)


 


Calcium Level


 


 


 


 9.4 mg/dL


(8.5-10.1)


 


Total Bilirubin


 


 


 


 0.5 mg/dL


(0.2-1.0)


 


Aspartate Amino Transf


(AST/SGOT) 


 


 


 16 U/L (15-37) 





 


Alanine Aminotransferase


(ALT/SGPT) 


 


 


 15 U/L (14-59) 





 


Alkaline Phosphatase


 


 


 


 129 U/L


()


 


Total Protein


 


 


 


 5.1 g/dL


(6.4-8.2)


 


Albumin


 


 


 


 1.1 g/dL


(3.4-5.0)


 


Albumin/Globulin Ratio    0.3 (1.0-1.7) 


 


Test


 7/24/20


12:24 


 


 





 


Glucose (Fingerstick)


 156 mg/dL


(70-99) 


 


 











Laboratory Tests








Test


 7/23/20


18:12 7/24/20


00:02 7/24/20


06:00 7/24/20


06:21


 


Glucose (Fingerstick)


 126 mg/dL


(70-99) 138 mg/dL


(70-99) 


 150 mg/dL


(70-99)


 


White Blood Count


 


 


 16.9 x10^3/uL


(4.0-11.0) 





 


Red Blood Count


 


 


 2.66 x10^6/uL


(3.50-5.40) 





 


Hemoglobin


 


 


 7.9 g/dL


(12.0-15.5) 





 


Hematocrit


 


 


 24.3 %


(36.0-47.0) 





 


Mean Corpuscular Volume   91 fL ()  


 


Mean Corpuscular Hemoglobin   30 pg (25-35)  


 


Mean Corpuscular Hemoglobin


Concent 


 


 32 g/dL


(31-37) 





 


Red Cell Distribution Width


 


 


 16.7 %


(11.5-14.5) 





 


Platelet Count


 


 


 569 x10^3/uL


(140-400) 





 


Neutrophils (%) (Auto)   83 % (31-73)  


 


Lymphocytes (%) (Auto)   9 % (24-48)  


 


Monocytes (%) (Auto)   6 % (0-9)  


 


Eosinophils (%) (Auto)   2 % (0-3)  


 


Basophils (%) (Auto)   1 % (0-3)  


 


Neutrophils # (Auto)


 


 


 14.1 x10^3/uL


(1.8-7.7) 





 


Lymphocytes # (Auto)


 


 


 1.5 x10^3/uL


(1.0-4.8) 





 


Monocytes # (Auto)


 


 


 1.0 x10^3/uL


(0.0-1.1) 





 


Eosinophils # (Auto)


 


 


 0.3 x10^3/uL


(0.0-0.7) 





 


Basophils # (Auto)


 


 


 0.1 x10^3/uL


(0.0-0.2) 





 


Test


 7/24/20


07:30 7/24/20


12:24 


 





 


Sodium Level


 136 mmol/L


(136-145) 


 


 





 


Potassium Level


 4.2 mmol/L


(3.5-5.1) 


 


 





 


Chloride Level


 103 mmol/L


() 


 


 





 


Carbon Dioxide Level


 26 mmol/L


(21-32) 


 


 





 


Anion Gap 7 (6-14)    


 


Blood Urea Nitrogen


 13 mg/dL


(7-20) 


 


 





 


Creatinine


 0.6 mg/dL


(0.6-1.0) 


 


 





 


Estimated GFR


(Cockcroft-Gault) 106.3 


 


 


 





 


BUN/Creatinine Ratio 22 (6-20)    


 


Glucose Level


 125 mg/dL


(70-99) 


 


 





 


Calcium Level


 9.4 mg/dL


(8.5-10.1) 


 


 





 


Total Bilirubin


 0.5 mg/dL


(0.2-1.0) 


 


 





 


Aspartate Amino Transf


(AST/SGOT) 16 U/L (15-37) 


 


 


 





 


Alanine Aminotransferase


(ALT/SGPT) 15 U/L (14-59) 


 


 


 





 


Alkaline Phosphatase


 129 U/L


() 


 


 





 


Total Protein


 5.1 g/dL


(6.4-8.2) 


 


 





 


Albumin


 1.1 g/dL


(3.4-5.0) 


 


 





 


Albumin/Globulin Ratio 0.3 (1.0-1.7)    


 


Glucose (Fingerstick)


 


 156 mg/dL


(70-99) 


 











Problem List


Problems


Medical Problems:


(1) Acute pancreatitis


Status: Acute  





(2) Cholelithiasis


Status: Acute  








Assessment/Plan


supportive care





Justicifation of Admission Dx:


Justifications for Admission:


Justification of Admission Dx:  Yes











SAURAV NASH APRN            Jul 24, 2020 12:53

## 2020-07-24 NOTE — NUR
Pharmacy TPN Dosing Note



S: JESENIA BEAN is a 49 year old F Currently receiving Central Continuous TPN started 
03/18/20



B:Pertinent PMH: Necrotizing pancreatitis

Height: 5 feet, 8 inches

Weight: 86.6 kg



Current diet: NPO 



LABS:

Sodium:    136 

Potassium: 4.2 

Chloride:  103 

Calcium:   9.4 

Corrected Calcium: 11.72 

Magnesium: 1.9 (7/23) 

CO2:       26 

SCr:       0.6 

Glucose:   125-150 

Albumin:   1.1 

AST:       16 

ALT:       15 



TPN FORMULA:

TPN TYPE:  Central Continuous

AMINO ACIDS:         95 gm

DEXTROSE:            275 gm

LIPIDS:              30 gm

SODIUM CHLORIDE:     120 mEq

SODIUM ACETATE:      - mEq

SODIUM PHOSPHATE:    10 mmol

POTASSIUM CHLORIDE:  30 mEq

POTASSIUM ACETATE:   30 mEq

POTASSIUM PHOSPHATE: - mmol

MAGNESIUM:           15 mEq

CALCIUM:             - mEq

INSULIN:             15 units

MULTIPLE VITAMIN:    10 ml

TRACE ELEMENTS:      1 ml ml(s)



TPN PLAN:  

-Macros per dietician recommendations

-Electrolytes remain within normal limits, except for corrected calcium.

There is no calcium in the TPN at this time.

-No noted medication or tube feed changes at this time. Patient remains

unable to tolerate tube feeds.

-BMP and Phos ordered for 7/25/20.





R: Continue TPN with no changes to TPN formulation at this time. 

Will monitor electrolytes, glucose, and tolerance to TPN.



 BRANDI CORDOBA, [facility], 07/24/20 1854

## 2020-07-24 NOTE — PDOC
PROGRESS NOTES


Chief Complaint


Chief Complaint





IMPRESSION=============








S/P Exp. 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 

parapsilosis & PSAE (I-merrem/ceftazidime/AZT/cefepime))


Leucocytosis -trending upward 


Fever 


Acute gallstone pancreatitis with persistent necrosis


Postop (Exploratory laparotomy, lysis of adhesions, subtotal cholecystectomy 

with cholangiogram, gastrojejunostomy tube placement, pancreatic necrosectomy)


Acute hypoxic Respiratory failure required  mechanical ventilation


Tracheostomy


bilateral pleural effusions/pulm edema s/p Throacentesis on 6/15/2020


Severe Acute gallstone pancreatitis (not a surgical candidate at this time) with

necrosis


Acute kidney failure now requiring dialysis


Gallstones (Calculus of gallbladder with acute cholecystitis without 

obstruction)


HTN 


Intractable pain


Intractable nausea


Covid 19 negative. 


Acute on chronic anemia 


EEG: No seizure activity


Fever  - intermittent 


? Ileus with vomiting


Abd distention - U/S and CT reviewed s/p 0.4 L of opaque, debris-containing a

scites was removed 5/6


Acute pancreatitis with persistent necrosis


Gallstone pancreatitis with necrosis. 


   -CT A/P 6/6 showed multiple pseudocysts, slight larger on the right. s/p 

drains x 3, 6/7.  + PSAE (MDRO-R Cefepime, Zosyn ANSON < 64) and yeast, 


   -s/p drain 4/27. C. parapsilosis. s/p drain 5/6 + yeast & high amylase; s/p 

additional drain on 5/8. Drains removed. 


Ascites s/p paracentesis 4/15 & 5/6. C. parapsilosis 


JED. off HD. 


A large fluid collection in the pancreatic bed has slightly decreased in size, 

described below, the pancreas itself is difficult to  visualize, which could be 

due to necrosis or obscuration of pancreatic  parenchyma from the surrounding 

fluid collection.6/15 


- 4/27 status post ROBERT drain placement + C paropsilosis. s/p additional drains 

5/8


Anemia - S/p PRBCs. 


Cholelithiasis with thickening of the gallbladder wall.


Leucocytosis improving


JED, hyperkalemia, Metabolic acidosis off dialysis


hypocalcemia 


Prediabetes


HTN


s/p trach


Hyperglycemia


severe protein-caloric malnutrition


Moderate to large left pleural effusion with atelectasis and collapse  of most 

of the left lower lobe, stable


Extensive retroperitoneal fluid collections persist. Percutaneous  drains remain

within the collections in both paracolic gutters. These  communicate with 

additional pelvic and peripancreatic collections.











PLAN================











Continue Avycaz /micafungin


DC dapto


Follow-up C. difficile PCR


BC from 7/15 neg to date





History of Present Illness


History of Present Illness


7/24/2020


Patient seen in and examined in the ICU


She is still extremely critically ill


Appears weak, frail, and pale


Better color today with improved eye contact and expression


Discussed with RN


Chart reviewed











 








7/21/2020


Patient seen and examined in the ICU


She is still extremely critically ill


Appears extremely weak frail and pale


Discussed with RN


Chart reviewed





Vitals


Vitals





Vital Signs








  Date Time  Temp Pulse Resp B/P (MAP) Pulse Ox O2 Delivery O2 Flow Rate FiO2


 


7/24/20 08:01     97 Room Air  


 


7/24/20 08:00 99.0 133 33 123/63 (83)    





 99.0       


 


7/23/20 08:43       2.0 











Physical Exam


Physical Exam


GENERAL: sitting in chair, mild distress 


HEENT: Pupils equal, oral cavity dry. NGT out


NECK:  Tracheostomy 


LUNGS: Diminished aeration bases,  CT on left 


HEART:  S1, S2, regular, tachy 110s 


ABDOMEN: Distended, bowel sounds hypoactive, soft, richardson x 2, 3 ROBERT drains, G-J 

tube


: Chino in place 


EXTREMITIES: Trace generalized edema, no cyanosis. MARTHA hose bilaterally, 


SKIN: warm touch. No signs of rash.  


LUE-PICC without signs of complications


General:  Oriented X3, Cooperative, No acute distress


Heart:  Other (distant heart sounds, tachycardic)


Lungs:  Crackles, Other (l ct)


Abdomen:  Soft, Other (multiple drains in place)


Extremities:  Other (Diffuse edema)


Skin:  No rashes, No significant lesion





Labs


LABS


SEX: F                    EXAM DT: 07/21/20         ACCESSION#: [id number]


STATUS: ADM IN            ORD. PHYSICIAN: SAURAV NASH


REASON: fevers 


PROCEDURE: CT ABD PELV W/ IV CONTRST ONLY





EXAM: CT ABDOMEN/PELVIS WITH CONTRAST.


 


HISTORY: Fever.


 


TECHNIQUE: Computed tomography of the abdomen and pelvis was performed 


after the intravenous administration of iodinated contrast. One or more of


the following individualized dose reduction techniques were utilized for 


this examination:  


1. Automated exposure control.  


2. Adjustment of the mA and/or kV according to patient size.  


3. Use of iterative reconstruction technique.


 


COMPARISON: None.


 


FINDINGS: Lung windows through the visualized portions of the bases reveal


a left pleural drain. A right pleural effusion is small to moderate. There


is atelectasis or infiltrate throughout both lung bases. Bone windows 


reveal no suspicious lesions. Bilateral breast implants are noted. A 


central venous catheter tip is noted in the superior vena cava. The 


bladder is decompressed by a Chino catheter.


 


Multiple percutaneous drains are noted throughout the abdomen and pelvis. 


One fills the subcutaneous compartment along an anterior abdominal 


incision. There is no surrounding fluid collection.


 


Another in the right upper quadrant was under the liver and. There is no 


surrounding collection. The


 


Another on the left lower quadrant enters the collection in the left 


paracolic gutter/retroperitoneum and measures 16 cm craniocaudally and 8.4


x 4.3 cm transaxially. This is not clearly changed in size. It crosses the


midline pelvis to communicate with another larger similar collection in 


the right paracolic gutter and retroperitoneum that measures 19 cm 


craniocaudally by 11.5 x 8.1 cm transaxially. It also contains a 


percutaneous drain. This collection communicates with a collection that 


enters the right anterior pararenal space and retroperitoneum along the 


anterior aspect of the body and tail of the pancreas. A drain has been 


removed from this collection since the prior study. The collection 


persists, measuring approximately 3 cm short axis, but 20 cm long. It also


communicates with the left paracolic gutter collection superiorly.


 


A small amount of free pelvic fluid does not appear loculated. An 


air-fluid level in the rectum is consistent with diarrhea. There is at 


least mild diffuse colonic wall thickening. There is no small bowel 


obstruction.


 


A gastrojejunostomy catheter has its tip in the left upper quadrant. A 


hepatic cyst is likely benign and measures 1.3 cm. The spleen, 


gallbladder, adrenal glands and kidneys are unremarkable. The pancreatic 


parenchyma is small, consistent with necrosis/atrophy. The pancreatic duct


is not dilated.


 


IMPRESSION: 


1. Colonic wall thickening with findings suggesting diarrhea. Correlate 


for colitis.


2. Extensive retroperitoneal fluid collections persist. Percutaneous 


drains remain within the collections in both paracolic gutters. These 


communicate with additional pelvic and peripancreatic collections.


3. Small to moderate right pleural effusion. No left pleural fluid is 


detectable in this field-of-view with a drain in place. Bibasilar 


atelectasis or infiltrate.


 


Electronically signed by: ASIF Tripathi MD (7/21/2020 3:22 PM) 


[id number]














DICTATED and SIGNED BY:     ROSEMARIE TRIPATHI MD


DATE:     07/21/20 1522





Laboratory Tests








Test


 7/23/20


12:26 7/23/20


18:12 7/24/20


00:02 7/24/20


06:00


 


Glucose (Fingerstick)


 143 mg/dL


(70-99) 126 mg/dL


(70-99) 138 mg/dL


(70-99) 





 


White Blood Count


 


 


 


 16.9 x10^3/uL


(4.0-11.0)


 


Red Blood Count


 


 


 


 2.66 x10^6/uL


(3.50-5.40)


 


Hemoglobin


 


 


 


 7.9 g/dL


(12.0-15.5)


 


Hematocrit


 


 


 


 24.3 %


(36.0-47.0)


 


Mean Corpuscular Volume    91 fL () 


 


Mean Corpuscular Hemoglobin    30 pg (25-35) 


 


Mean Corpuscular Hemoglobin


Concent 


 


 


 32 g/dL


(31-37)


 


Red Cell Distribution Width


 


 


 


 16.7 %


(11.5-14.5)


 


Platelet Count


 


 


 


 569 x10^3/uL


(140-400)


 


Neutrophils (%) (Auto)    83 % (31-73) 


 


Lymphocytes (%) (Auto)    9 % (24-48) 


 


Monocytes (%) (Auto)    6 % (0-9) 


 


Eosinophils (%) (Auto)    2 % (0-3) 


 


Basophils (%) (Auto)    1 % (0-3) 


 


Neutrophils # (Auto)


 


 


 


 14.1 x10^3/uL


(1.8-7.7)


 


Lymphocytes # (Auto)


 


 


 


 1.5 x10^3/uL


(1.0-4.8)


 


Monocytes # (Auto)


 


 


 


 1.0 x10^3/uL


(0.0-1.1)


 


Eosinophils # (Auto)


 


 


 


 0.3 x10^3/uL


(0.0-0.7)


 


Basophils # (Auto)


 


 


 


 0.1 x10^3/uL


(0.0-0.2)


 


Test


 7/24/20


06:21 7/24/20


07:30 


 





 


Glucose (Fingerstick)


 150 mg/dL


(70-99) 


 


 





 


Sodium Level


 


 136 mmol/L


(136-145) 


 





 


Potassium Level


 


 4.2 mmol/L


(3.5-5.1) 


 





 


Chloride Level


 


 103 mmol/L


() 


 





 


Carbon Dioxide Level


 


 26 mmol/L


(21-32) 


 





 


Anion Gap  7 (6-14)   


 


Blood Urea Nitrogen


 


 13 mg/dL


(7-20) 


 





 


Creatinine


 


 0.6 mg/dL


(0.6-1.0) 


 





 


Estimated GFR


(Cockcroft-Gault) 


 106.3 


 


 





 


BUN/Creatinine Ratio  22 (6-20)   


 


Glucose Level


 


 125 mg/dL


(70-99) 


 





 


Calcium Level


 


 9.4 mg/dL


(8.5-10.1) 


 





 


Total Bilirubin


 


 0.5 mg/dL


(0.2-1.0) 


 





 


Aspartate Amino Transf


(AST/SGOT) 


 16 U/L (15-37) 


 


 





 


Alanine Aminotransferase


(ALT/SGPT) 


 15 U/L (14-59) 


 


 





 


Alkaline Phosphatase


 


 129 U/L


() 


 





 


Total Protein


 


 5.1 g/dL


(6.4-8.2) 


 





 


Albumin


 


 1.1 g/dL


(3.4-5.0) 


 





 


Albumin/Globulin Ratio  0.3 (1.0-1.7)   











Assessment and Plan


Assessmemt and Plan


Problems


Medical Problems:


(1) Acute pancreatitis


Status: Acute  





(2) Cholelithiasis


Status: Acute  











Comment


Review of Relevant


I have reviewed the following items josy (where applicable) has been applied.


Labs





Laboratory Tests








Test


 7/22/20


13:32 7/22/20


17:43 7/23/20


01:25 7/23/20


06:00


 


Glucose (Fingerstick)


 144 mg/dL


(70-99) 134 mg/dL


(70-99) 132 mg/dL


(70-99) 





 


Sodium Level


 


 


 


 134 mmol/L


(136-145)


 


Potassium Level


 


 


 


 4.4 mmol/L


(3.5-5.1)


 


Chloride Level


 


 


 


 102 mmol/L


()


 


Carbon Dioxide Level


 


 


 


 28 mmol/L


(21-32)


 


Anion Gap    4 (6-14) 


 


Blood Urea Nitrogen


 


 


 


 14 mg/dL


(7-20)


 


Creatinine


 


 


 


 0.5 mg/dL


(0.6-1.0)


 


Estimated GFR


(Cockcroft-Gault) 


 


 


 131.1 





 


Glucose Level


 


 


 


 121 mg/dL


(70-99)


 


Calcium Level


 


 


 


 9.7 mg/dL


(8.5-10.1)


 


Phosphorus Level


 


 


 


 4.1 mg/dL


(2.6-4.7)


 


Magnesium Level


 


 


 


 1.9 mg/dL


(1.8-2.4)


 


Triglycerides Level


 


 


 


 181 mg/dL


(0-150)


 


Test


 7/23/20


06:04 7/23/20


09:00 7/23/20


12:26 7/23/20


18:12


 


Glucose (Fingerstick)


 117 mg/dL


(70-99) 


 143 mg/dL


(70-99) 126 mg/dL


(70-99)


 


White Blood Count


 


 18.4 x10^3/uL


(4.0-11.0) 


 





 


Red Blood Count


 


 2.92 x10^6/uL


(3.50-5.40) 


 





 


Hemoglobin


 


 8.2 g/dL


(12.0-15.5) 


 





 


Hematocrit


 


 25.0 %


(36.0-47.0) 


 





 


Mean Corpuscular Volume  86 fL ()   


 


Mean Corpuscular Hemoglobin  28 pg (25-35)   


 


Mean Corpuscular Hemoglobin


Concent 


 33 g/dL


(31-37) 


 





 


Red Cell Distribution Width


 


 15.6 %


(11.5-14.5) 


 





 


Platelet Count


 


 532 x10^3/uL


(140-400) 


 





 


Neutrophils (%) (Auto)  85 % (31-73)   


 


Lymphocytes (%) (Auto)  6 % (24-48)   


 


Monocytes (%) (Auto)  5 % (0-9)   


 


Eosinophils (%) (Auto)  3 % (0-3)   


 


Basophils (%) (Auto)  1 % (0-3)   


 


Neutrophils # (Auto)


 


 15.7 x10^3/uL


(1.8-7.7) 


 





 


Lymphocytes # (Auto)


 


 1.2 x10^3/uL


(1.0-4.8) 


 





 


Monocytes # (Auto)


 


 0.9 x10^3/uL


(0.0-1.1) 


 





 


Eosinophils # (Auto)


 


 0.6 x10^3/uL


(0.0-0.7) 


 





 


Basophils # (Auto)


 


 0.1 x10^3/uL


(0.0-0.2) 


 





 


Test


 7/24/20


00:02 7/24/20


06:00 7/24/20


06:21 7/24/20


07:30


 


Glucose (Fingerstick)


 138 mg/dL


(70-99) 


 150 mg/dL


(70-99) 





 


White Blood Count


 


 16.9 x10^3/uL


(4.0-11.0) 


 





 


Red Blood Count


 


 2.66 x10^6/uL


(3.50-5.40) 


 





 


Hemoglobin


 


 7.9 g/dL


(12.0-15.5) 


 





 


Hematocrit


 


 24.3 %


(36.0-47.0) 


 





 


Mean Corpuscular Volume  91 fL ()   


 


Mean Corpuscular Hemoglobin  30 pg (25-35)   


 


Mean Corpuscular Hemoglobin


Concent 


 32 g/dL


(31-37) 


 





 


Red Cell Distribution Width


 


 16.7 %


(11.5-14.5) 


 





 


Platelet Count


 


 569 x10^3/uL


(140-400) 


 





 


Neutrophils (%) (Auto)  83 % (31-73)   


 


Lymphocytes (%) (Auto)  9 % (24-48)   


 


Monocytes (%) (Auto)  6 % (0-9)   


 


Eosinophils (%) (Auto)  2 % (0-3)   


 


Basophils (%) (Auto)  1 % (0-3)   


 


Neutrophils # (Auto)


 


 14.1 x10^3/uL


(1.8-7.7) 


 





 


Lymphocytes # (Auto)


 


 1.5 x10^3/uL


(1.0-4.8) 


 





 


Monocytes # (Auto)


 


 1.0 x10^3/uL


(0.0-1.1) 


 





 


Eosinophils # (Auto)


 


 0.3 x10^3/uL


(0.0-0.7) 


 





 


Basophils # (Auto)


 


 0.1 x10^3/uL


(0.0-0.2) 


 





 


Sodium Level


 


 


 


 136 mmol/L


(136-145)


 


Potassium Level


 


 


 


 4.2 mmol/L


(3.5-5.1)


 


Chloride Level


 


 


 


 103 mmol/L


()


 


Carbon Dioxide Level


 


 


 


 26 mmol/L


(21-32)


 


Anion Gap    7 (6-14) 


 


Blood Urea Nitrogen


 


 


 


 13 mg/dL


(7-20)


 


Creatinine


 


 


 


 0.6 mg/dL


(0.6-1.0)


 


Estimated GFR


(Cockcroft-Gault) 


 


 


 106.3 





 


BUN/Creatinine Ratio    22 (6-20) 


 


Glucose Level


 


 


 


 125 mg/dL


(70-99)


 


Calcium Level


 


 


 


 9.4 mg/dL


(8.5-10.1)


 


Total Bilirubin


 


 


 


 0.5 mg/dL


(0.2-1.0)


 


Aspartate Amino Transf


(AST/SGOT) 


 


 


 16 U/L (15-37) 





 


Alanine Aminotransferase


(ALT/SGPT) 


 


 


 15 U/L (14-59) 





 


Alkaline Phosphatase


 


 


 


 129 U/L


()


 


Total Protein


 


 


 


 5.1 g/dL


(6.4-8.2)


 


Albumin


 


 


 


 1.1 g/dL


(3.4-5.0)


 


Albumin/Globulin Ratio    0.3 (1.0-1.7) 








Laboratory Tests








Test


 7/23/20


12:26 7/23/20


18:12 7/24/20


00:02 7/24/20


06:00


 


Glucose (Fingerstick)


 143 mg/dL


(70-99) 126 mg/dL


(70-99) 138 mg/dL


(70-99) 





 


White Blood Count


 


 


 


 16.9 x10^3/uL


(4.0-11.0)


 


Red Blood Count


 


 


 


 2.66 x10^6/uL


(3.50-5.40)


 


Hemoglobin


 


 


 


 7.9 g/dL


(12.0-15.5)


 


Hematocrit


 


 


 


 24.3 %


(36.0-47.0)


 


Mean Corpuscular Volume    91 fL () 


 


Mean Corpuscular Hemoglobin    30 pg (25-35) 


 


Mean Corpuscular Hemoglobin


Concent 


 


 


 32 g/dL


(31-37)


 


Red Cell Distribution Width


 


 


 


 16.7 %


(11.5-14.5)


 


Platelet Count


 


 


 


 569 x10^3/uL


(140-400)


 


Neutrophils (%) (Auto)    83 % (31-73) 


 


Lymphocytes (%) (Auto)    9 % (24-48) 


 


Monocytes (%) (Auto)    6 % (0-9) 


 


Eosinophils (%) (Auto)    2 % (0-3) 


 


Basophils (%) (Auto)    1 % (0-3) 


 


Neutrophils # (Auto)


 


 


 


 14.1 x10^3/uL


(1.8-7.7)


 


Lymphocytes # (Auto)


 


 


 


 1.5 x10^3/uL


(1.0-4.8)


 


Monocytes # (Auto)


 


 


 


 1.0 x10^3/uL


(0.0-1.1)


 


Eosinophils # (Auto)


 


 


 


 0.3 x10^3/uL


(0.0-0.7)


 


Basophils # (Auto)


 


 


 


 0.1 x10^3/uL


(0.0-0.2)


 


Test


 7/24/20


06:21 7/24/20


07:30 


 





 


Glucose (Fingerstick)


 150 mg/dL


(70-99) 


 


 





 


Sodium Level


 


 136 mmol/L


(136-145) 


 





 


Potassium Level


 


 4.2 mmol/L


(3.5-5.1) 


 





 


Chloride Level


 


 103 mmol/L


() 


 





 


Carbon Dioxide Level


 


 26 mmol/L


(21-32) 


 





 


Anion Gap  7 (6-14)   


 


Blood Urea Nitrogen


 


 13 mg/dL


(7-20) 


 





 


Creatinine


 


 0.6 mg/dL


(0.6-1.0) 


 





 


Estimated GFR


(Cockcroft-Gault) 


 106.3 


 


 





 


BUN/Creatinine Ratio  22 (6-20)   


 


Glucose Level


 


 125 mg/dL


(70-99) 


 





 


Calcium Level


 


 9.4 mg/dL


(8.5-10.1) 


 





 


Total Bilirubin


 


 0.5 mg/dL


(0.2-1.0) 


 





 


Aspartate Amino Transf


(AST/SGOT) 


 16 U/L (15-37) 


 


 





 


Alanine Aminotransferase


(ALT/SGPT) 


 15 U/L (14-59) 


 


 





 


Alkaline Phosphatase


 


 129 U/L


() 


 





 


Total Protein


 


 5.1 g/dL


(6.4-8.2) 


 





 


Albumin


 


 1.1 g/dL


(3.4-5.0) 


 





 


Albumin/Globulin Ratio  0.3 (1.0-1.7)   








Microbiology


7/21/20 Blood Culture - Preliminary, Resulted


          NO GROWTH AFTER 2 DAYS


7/21/20 Gram Stain - Final, Resulted


          


7/21/20 Aerobic and Anaerobic Culture - Preliminary, Resulted


          


7/19/20 Gram Stain Evaluation - Final, Complete


          


7/19/20 Respiratory Culture - Final, Complete


          


7/19/20 Antimicrobic Susceptibility - Final, Complete


          


6/30/20 Gram Stain - Final, Complete


          


6/30/20 Aerobic and Anaerobic Culture - Final, Complete


          


6/30/20 Antimicrobic Susceptibility - Final, Complete


          


6/7/20 Urine Culture - Final, Complete


         


5/30/20 Gram Stain - Final, Complete


          


5/30/20 Aerobic Culture - Final, Complete


Medications





Current Medications


Sodium Chloride 1,000 ml @  1,000 mls/hr Q1H IV  Last administered on 3/16/20at 

03:00;  Start 3/16/20 at 03:00;  Stop 3/16/20 at 03:59;  Status DC


Ondansetron HCl (Zofran) 4 mg 1X  ONCE IVP  Last administered on 3/16/20at 

03:27;  Start 3/16/20 at 03:00;  Stop 3/16/20 at 03:01;  Status DC


Morphine Sulfate (Morphine Sulfate) 4 mg 1X  ONCE IV ;  Start 3/16/20 at 03:00; 

Stop 3/16/20 at 03:01;  Status Cancel


Ketorolac Tromethamine (Toradol 30mg Vial) 30 mg 1X  ONCE IV  Last administered 

on 3/16/20at 02:54;  Start 3/16/20 at 03:00;  Stop 3/16/20 at 03:01;  Status DC


Fentanyl Citrate (Fentanyl 2ml Vial) 25 mcg 1X  ONCE IVP  Last administered on 

3/16/20at 03:23;  Start 3/16/20 at 03:30;  Stop 3/16/20 at 03:31;  Status DC


Fentanyl Citrate (Fentanyl 2ml Vial) 100 mcg STK-MED ONCE .ROUTE ;  Start 

3/16/20 at 03:18;  Stop 3/16/20 at 03:18;  Status DC


Iohexol (Omnipaque 350 Mg/ml) 90 ml 1X  ONCE IV  Last administered on 3/16/20at 

03:25;  Start 3/16/20 at 03:30;  Stop 3/16/20 at 03:31;  Status DC


Info (CONTRAST GIVEN -- Rx MONITORING) 1 each PRN DAILY  PRN MC SEE COMMENTS;  

Start 3/16/20 at 03:30;  Stop 3/18/20 at 03:29;  Status DC


Hydromorphone HCl (Dilaudid) 0.5 mg 1X  ONCE IV  Last administered on 3/16/20at 

03:55;  Start 3/16/20 at 04:30;  Stop 3/16/20 at 04:32;  Status DC


Ondansetron HCl (Zofran) 4 mg PRN Q8HRS  PRN IV NAUSEA/VOMITING 1ST CHOICE;  

Start 3/16/20 at 05:00;  Stop 3/16/20 at 09:27;  Status DC


Morphine Sulfate (Morphine Sulfate) 2 mg PRN Q2HR  PRN IV SEVERE PAIN 7-10 Last 

administered on 3/17/20at 12:26;  Start 3/16/20 at 05:00;  Stop 3/17/20 at 

14:15;  Status DC


Sodium Chloride 1,000 ml @  125 mls/hr Q8H IV  Last administered on 3/16/20at 

20:56;  Start 3/16/20 at 05:00;  Stop 3/17/20 at 04:59;  Status DC


Hydromorphone HCl (Dilaudid) 0.5 mg PRN Q3HRS  PRN IV SEVERE PAIN 7-10 Last 

administered on 3/17/20at 10:06;  Start 3/16/20 at 05:00;  Stop 3/17/20 at 

12:01;  Status DC


Piperacillin Sod/ Tazobactam Sod 4.5 gm/Sodium Chloride 100 ml @  200 mls/hr 1X 

ONCE IV  Last administered on 3/16/20at 05:44;  Start 3/16/20 at 06:00;  Stop 

3/16/20 at 06:29;  Status DC


Ondansetron HCl (Zofran) 4 mg PRN Q4HRS  PRN IV NAUSEA/VOMITING 1ST CHOICE Last 

administered on 7/23/20at 20:49;  Start 3/16/20 at 09:30


Insulin Human Lispro (HumaLOG) 0-9 UNITS Q6HRS SQ  Last administered on 

7/20/20at 14:42;  Start 3/16/20 at 09:30


Dextrose (Dextrose 50%-Water Syringe) 12.5 gm PRN Q15MIN  PRN IV SEE COMMENTS;  

Start 3/16/20 at 09:30


Pantoprazole Sodium (PROTONIX VIAL for IV PUSH) 40 mg DAILYAC IVP  Last 

administered on 7/24/20at 07:26;  Start 3/16/20 at 11:30


Prochlorperazine Edisylate (Compazine) 10 mg PRN Q6HRS  PRN IV NAUSEA/VOMITING, 

2nd CHOICE Last administered on 7/23/20at 13:45;  Start 3/16/20 at 17:45


Atenolol (Tenormin) 100 mg DAILY PO ;  Start 3/17/20 at 09:00;  Stop 3/16/20 at 

20:08;  Status DC


Metoprolol Tartrate (Lopressor Vial) 2.5 mg Q6HRS IVP  Last administered on 

3/17/20at 05:51;  Start 3/16/20 at 20:15;  Stop 3/17/20 at 10:02;  Status DC


Metoprolol Tartrate (Lopressor Vial) 5 mg Q6HRS IVP  Last administered on 

3/26/20at 00:12;  Start 3/17/20 at 10:15;  Stop 3/28/20 at 08:48;  Status DC


Hydromorphone HCl (Dilaudid) 1 mg PRN Q3HRS  PRN IV SEVERE PAIN 7-10 Last 

administered on 3/23/20at 05:13;  Start 3/17/20 at 12:00;  Stop 3/31/20 at 

00:25;  Status DC


Lidocaine HCl (Buffered Lidocaine 1%) 3 ml STK-MED ONCE .ROUTE ;  Start 3/17/20 

at 12:55;  Stop 3/17/20 at 12:56;  Status DC


Albumin Human 500 ml @  125 mls/hr 1X  ONCE IV  Last administered on 3/17/20at 

14:33;  Start 3/17/20 at 14:30;  Stop 3/17/20 at 18:32;  Status DC


Norepinephrine Bitartrate 8 mg/ Dextrose 258 ml @  17.299 mls/ hr CONT  PRN IV 

PER PROTOCOL Last administered on 4/14/20at 12:48;  Start 3/17/20 at 15:30;  

Stop 4/17/20 at 09:19;  Status DC


Sodium Chloride 1,000 ml @  125 mls/hr Q8H IV  Last administered on 3/17/20at 

21:04;  Start 3/17/20 at 16:00;  Stop 3/18/20 at 02:42;  Status DC


Albumin Human 500 ml @  125 mls/hr PRN BID  PRN IV After every 2L NSS & BP < 

90mm Last administered on 6/30/20at 16:06;  Start 3/17/20 at 16:00;  Stop 7/3/20

at 09:30;  Status DC


Iohexol (Omnipaque 300 Mg/ml) 60 ml 1X  ONCE IV  Last administered on 3/17/20at 

17:20;  Start 3/17/20 at 17:00;  Stop 3/17/20 at 17:01;  Status DC


Info (CONTRAST GIVEN -- Rx MONITORING) 1 each PRN DAILY  PRN MC SEE COMMENTS;  

Start 3/17/20 at 17:00;  Stop 3/19/20 at 16:59;  Status DC


Meropenem 1 gm/ Sodium Chloride 100 ml @  200 mls/hr Q8HRS IV  Last administered

on 3/18/20at 05:45;  Start 3/17/20 at 20:00;  Stop 3/18/20 at 08:48;  Status DC


Furosemide (Lasix) 40 mg 1X  ONCE IVP  Last administered on 3/17/20at 22:12;  

Start 3/17/20 at 22:30;  Stop 3/17/20 at 22:31;  Status DC


Calcium Chloride 1000 mg/Sodium Chloride 110 ml @  220 mls/hr 1X  ONCE IV  Last 

administered on 3/17/20at 22:11;  Start 3/17/20 at 22:30;  Stop 3/17/20 at 

22:59;  Status DC


Albuterol Sulfate (Ventolin Neb Soln) 2.5 mg 1X  ONCE NEB  Last administered on 

3/18/20at 00:56;  Start 3/17/20 at 22:30;  Stop 3/17/20 at 22:31;  Status DC


Insulin Human Regular (HumuLIN R VIAL) 5 unit 1X  ONCE IV  Last administered on 

3/17/20at 22:14;  Start 3/17/20 at 22:30;  Stop 3/17/20 at 22:31;  Status DC


Magnesium Sulfate 50 ml @ 25 mls/hr 1X  ONCE IV  Last administered on 3/18/20at 

02:57;  Start 3/18/20 at 03:00;  Stop 3/18/20 at 04:59;  Status DC


Calcium Gluconate 1000 mg/Sodium Chloride 110 ml @  220 mls/hr 1X  ONCE IV  Last

administered on 3/18/20at 02:46;  Start 3/18/20 at 03:00;  Stop 3/18/20 at 

03:29;  Status DC


Sodium Chloride 1,000 ml @  200 mls/hr Q5H IV  Last administered on 3/18/20at 

02:46;  Start 3/18/20 at 03:00;  Stop 3/18/20 at 10:21;  Status DC


Calcium Gluconate 1000 mg/Sodium Chloride 110 ml @  220 mls/hr 1X  ONCE IV  Last

administered on 3/18/20at 03:21;  Start 3/18/20 at 03:30;  Stop 3/18/20 at 

03:59;  Status DC


Sodium Bicarbonate 50 meq/Sodium Chloride 1,050 ml @  75 mls/hr Q14H IV  Last 

administered on 3/22/20at 21:10;  Start 3/18/20 at 07:30;  Stop 3/23/20 at 

10:28;  Status DC


Calcium Gluconate 2000 mg/Sodium Chloride 120 ml @  220 mls/hr 1X  ONCE IV  Last

administered on 3/18/20at 09:05;  Start 3/18/20 at 07:30;  Stop 3/18/20 at 

08:02;  Status DC


Lidocaine HCl (Xylocaine-Mpf 1% 2ml Vial) 2 ml STK-MED ONCE .ROUTE ;  Start 

3/18/20 at 08:47;  Stop 3/18/20 at 08:47;  Status DC


Meropenem 500 mg/ Sodium Chloride 50 ml @  100 mls/hr Q12HR IV  Last 

administered on 3/23/20at 21:01;  Start 3/18/20 at 18:00;  Stop 3/24/20 at 

07:58;  Status DC


Lidocaine HCl (Buffered Lidocaine 1%) 3 ml STK-MED ONCE .ROUTE ;  Start 3/18/20 

at 09:46;  Stop 3/18/20 at 09:46;  Status DC


Lidocaine HCl (Buffered Lidocaine 1%) 6 ml 1X  ONCE INJ  Last administered on 

3/18/20at 10:26;  Start 3/18/20 at 10:15;  Stop 3/18/20 at 10:16;  Status DC


Info (Tpn Per Pharmacy) 1 each PRN DAILY  PRN MC SEE COMMENTS Last administered 

on 7/23/20at 11:42;  Start 3/18/20 at 12:00


Sodium Chloride 1,000 ml @  1,000 mls/hr Q1H PRN IV hypotension;  Start 3/18/20 

at 12:07;  Stop 3/18/20 at 18:06;  Status DC


Diphenhydramine HCl (Benadryl) 25 mg 1X PRN  PRN IV ITCHING;  Start 3/18/20 at 

12:15;  Stop 3/19/20 at 12:14;  Status DC


Diphenhydramine HCl (Benadryl) 25 mg 1X PRN  PRN IV ITCHING;  Start 3/18/20 at 

12:15;  Stop 3/19/20 at 12:14;  Status DC


Sodium Chloride 1,000 ml @  400 mls/hr Q2H30M PRN IV PATENCY;  Start 3/18/20 at 

12:07;  Stop 3/19/20 at 00:06;  Status DC


Info (PHARMACY MONITORING -- do not chart) 1 each PRN DAILY  PRN MC SEE 

COMMENTS;  Start 3/18/20 at 12:15;  Stop 3/20/20 at 08:13;  Status DC


Sodium Chloride 90 meq/Calcium Gluconate 10 meq/ Multivitamins 10 ml/Chromium/ 

Copper/Manganese/ Seleni/Zn 1 ml/ Total Parenteral Nutrition/Amino 

Acids/Dextrose/ Fat Emulsion Intravenous 55.005 ml  @ 2.292 mls/hr TPN  CONT IV 

;  Start 3/18/20 at 22:00;  Stop 3/18/20 at 12:33;  Status DC


Info (Tpn Per Pharmacy) 1 each PRN DAILY  PRN MC SEE COMMENTS;  Start 3/18/20 at

12:30;  Status UNV


Sodium Chloride 90 meq/Calcium Gluconate 10 meq/ Multivitamins 10 ml/Chromium/ C

opper/Manganese/ Seleni/Zn 0.5 ml/ Total Parenteral Nutrition/Amino 

Acids/Dextrose/ Fat Emulsion Intravenous 1,512 ml @  63 mls/hr TPN  CONT IV  

Last administered on 3/18/20at 22:06;  Start 3/18/20 at 22:00;  Stop 3/19/20 at 

21:59;  Status DC


Calcium Carbonate/ Glycine (Tums) 500 mg PRN AFTMEALHC  PRN PO INDIGESTION;  

Start 3/18/20 at 17:45;  Stop 5/13/20 at 10:25;  Status DC


Calcium Gluconate (Calcium Gluconate) 2,000 mg 1X  ONCE IVP  Last administered 

on 3/19/20at 02:19;  Start 3/19/20 at 02:15;  Stop 3/19/20 at 02:16;  Status DC


Calcium Chloride 3000 mg/Sodium Chloride 1,030 ml @  50 mls/hr [id number] IV  Last 

administered on 3/21/20at 02:17;  Start 3/19/20 at 08:00;  Stop 3/21/20 at 

15:23;  Status DC


Lorazepam (Ativan Inj) 1 mg PRN Q4HRS  PRN IVP ANXIETY / AGITATION, 2nd choic 

Last administered on 4/17/20at 03:51;  Start 3/19/20 at 09:00;  Stop 4/17/20 at 

09:19;  Status DC


Sodium Chloride 1,000 ml @  1,000 mls/hr Q1H PRN IV hypotension;  Start 3/19/20 

at 08:56;  Stop 3/19/20 at 14:55;  Status DC


Albumin Human 200 ml @  200 mls/hr 1X PRN  PRN IV Hypotension;  Start 3/19/20 at

09:00;  Stop 3/19/20 at 14:59;  Status DC


Diphenhydramine HCl (Benadryl) 25 mg 1X PRN  PRN IV ITCHING;  Start 3/19/20 at 

09:00;  Stop 3/20/20 at 08:59;  Status DC


Diphenhydramine HCl (Benadryl) 25 mg 1X PRN  PRN IV ITCHING;  Start 3/19/20 at 

09:00;  Stop 3/20/20 at 08:59;  Status DC


Sodium Chloride 1,000 ml @  400 mls/hr Q2H30M PRN IV PATENCY;  Start 3/19/20 at 

08:56;  Stop 3/19/20 at 20:55;  Status DC


Info (PHARMACY MONITORING -- do not chart) 1 each PRN DAILY  PRN MC SEE 

COMMENTS;  Start 3/19/20 at 09:00;  Status UNV


Info (PHARMACY MONITORING -- do not chart) 1 each PRN DAILY  PRN MC SEE 

COMMENTS;  Start 3/19/20 at 09:00;  Stop 3/20/20 at 08:13;  Status DC


Digoxin (Lanoxin) 500 mcg 1X  ONCE IV  Last administered on 3/19/20at 10:04;  

Start 3/19/20 at 10:00;  Stop 3/19/20 at 10:01;  Status DC


Digoxin (Lanoxin) 125 mcg 1X  ONCE IV  Last administered on 3/19/20at 17:10;  

Start 3/19/20 at 18:00;  Stop 3/19/20 at 18:01;  Status DC


Magnesium Sulfate 100 ml @  25 mls/hr 1X  ONCE IV  Last administered on 

3/19/20at 12:48;  Start 3/19/20 at 13:00;  Stop 3/19/20 at 16:59;  Status DC


Sodium Chloride 90 meq/Magnesium Sulfate 10 meq/ Calcium Gluconate 20 meq/ 

Multivitamins 10 ml/Chromium/ Copper/Manganese/ Seleni/Zn 0.5 ml/ Total 

Parenteral Nutrition/Amino Acids/Dextrose/ Fat Emulsion Intravenous 1,512 ml @  

63 mls/hr TPN  CONT IV  Last administered on 3/19/20at 22:25;  Start 3/19/20 at 

22:00;  Stop 3/20/20 at 21:59;  Status DC


Sodium Chloride 1,000 ml @  1,000 mls/hr Q1H PRN IV hypotension;  Start 3/20/20 

at 08:05;  Stop 3/20/20 at 14:04;  Status DC


Albumin Human 200 ml @  200 mls/hr 1X  ONCE IV  Last administered on 3/20/20at 

08:57;  Start 3/20/20 at 08:15;  Stop 3/20/20 at 09:14;  Status DC


Diphenhydramine HCl (Benadryl) 25 mg 1X PRN  PRN IV ITCHING;  Start 3/20/20 at 

08:15;  Stop 3/21/20 at 08:14;  Status DC


Diphenhydramine HCl (Benadryl) 25 mg 1X PRN  PRN IV ITCHING;  Start 3/20/20 at 

08:15;  Stop 3/21/20 at 08:14;  Status DC


Sodium Chloride 1,000 ml @  400 mls/hr Q2H30M PRN IV PATENCY;  Start 3/20/20 at 

08:05;  Stop 3/20/20 at 20:04;  Status DC


Info (PHARMACY MONITORING -- do not chart) 1 each PRN DAILY  PRN MC SEE 

COMMENTS;  Start 3/20/20 at 08:15;  Stop 3/24/20 at 07:57;  Status DC


Sodium Chloride 90 meq/Potassium Chloride 15 meq/ Potassium Phosphate 10 mmol/ 

Magnesium Sulfate 10 meq/Calcium Gluconate 20 meq/ Multivitamins 10 ml/Chromium/

Copper/Manganese/ Seleni/Zn 0.5 ml/ Total Parenteral Nutrition/Amino 

Acids/Dextrose/ Fat Emulsion Intravenous 1,512 ml @  63 mls/hr TPN  CONT IV  

Last administered on 3/20/20at 21:01;  Start 3/20/20 at 22:00;  Stop 3/21/20 at 

21:59;  Status DC


Potassium Chloride/Water 100 ml @  100 mls/hr 1X  ONCE IV  Last administered on 

3/20/20at 14:09;  Start 3/20/20 at 14:00;  Stop 3/20/20 at 14:59;  Status DC


Benzocaine (Hurricaine One) 1 spray 1X  ONCE MM  Last administered on 3/20/20at 

16:38;  Start 3/20/20 at 14:30;  Stop 3/20/20 at 14:31;  Status DC


Lidocaine HCl (Glydo (Lidocaine) Jelly) 1 thomas 1X  ONCE MM  Last administered on 

3/20/20at 16:38;  Start 3/20/20 at 14:30;  Stop 3/20/20 at 14:31;  Status DC


Linezolid/Dextrose 300 ml @  300 mls/hr Q12HR IV  Last administered on 3/26/20at

21:04;  Start 3/20/20 at 20:00;  Stop 3/27/20 at 07:50;  Status DC


Acetaminophen (Tylenol) 650 mg PRN Q6HRS  PRN PO MILD PAIN / TEMP;  Start 

3/21/20 at 03:30;  Stop 3/21/20 at 03:36;  Status DC


Acetaminophen (Tylenol) 650 mg PRN Q6HRS  PRN PEG MILD PAIN / TEMP Last 

administered on 4/16/20at 19:56;  Start 3/21/20 at 03:36;  Stop 5/13/20 at 

10:25;  Status DC


Sodium Chloride 1,000 ml @  1,000 mls/hr Q1H PRN IV hypotension;  Start 3/21/20 

at 07:50;  Stop 3/21/20 at 13:49;  Status DC


Albumin Human 200 ml @  200 mls/hr 1X PRN  PRN IV Hypotension;  Start 3/21/20 at

08:00;  Stop 3/21/20 at 13:59;  Status DC


Sodium Chloride (Normal Saline Flush) 10 ml 1X PRN  PRN IV AP catheter pack;  

Start 3/21/20 at 08:00;  Stop 3/22/20 at 07:59;  Status DC


Sodium Chloride (Normal Saline Flush) 10 ml 1X PRN  PRN IV  catheter pack;  

Start 3/21/20 at 08:00;  Stop 3/22/20 at 07:59;  Status DC


Sodium Chloride 1,000 ml @  400 mls/hr Q2H30M PRN IV PATENCY;  Start 3/21/20 at 

07:50;  Stop 3/21/20 at 19:49;  Status DC


Info (PHARMACY MONITORING -- do not chart) 1 each PRN DAILY  PRN MC SEE 

COMMENTS;  Start 3/21/20 at 08:00;  Status UNV


Info (PHARMACY MONITORING -- do not chart) 1 each PRN DAILY  PRN MC SEE 

COMMENTS;  Start 3/21/20 at 08:00;  Stop 3/23/20 at 08:25;  Status DC


Sodium Chloride 90 meq/Potassium Chloride 15 meq/ Potassium Phosphate 10 mmol/ 

Magnesium Sulfate 10 meq/Calcium Gluconate 20 meq/ Multivitamins 10 ml/Chromium/

Copper/Manganese/ Seleni/Zn 0.5 ml/ Total Parenteral Nutrition/Amino 

Acids/Dextrose/ Fat Emulsion Intravenous 1,512 ml @  63 mls/hr TPN  CONT IV  

Last administered on 3/21/20at 20:57;  Start 3/21/20 at 22:00;  Stop 3/22/20 at 

21:59;  Status DC


Sodium Chloride 90 meq/Potassium Chloride 15 meq/ Potassium Phosphate 15 mmol/ 

Magnesium Sulfate 10 meq/Calcium Gluconate 20 meq/ Multivitamins 10 ml/Chromium/

Copper/Manganese/ Seleni/Zn 0.5 ml/ Total Parenteral Nutrition/Amino 

Acids/Dextrose/ Fat Emulsion Intravenous 1,512 ml @  63 mls/hr TPN  CONT IV ;  

Start 3/22/20 at 22:00;  Stop 3/22/20 at 14:16;  Status DC


Sodium Chloride 90 meq/Potassium Chloride 15 meq/ Potassium Phosphate 15 mmol/ 

Magnesium Sulfate 10 meq/Calcium Gluconate 20 meq/ Multivitamins 10 ml/Chromium/

Copper/Manganese/ Seleni/Zn 0.5 ml/ Total Parenteral Nutrition/Amino Acids/Dex

trose/ Fat Emulsion Intravenous 1,200 ml @  50 mls/hr TPN  CONT IV ;  Start 

3/22/20 at 22:00;  Stop 3/22/20 at 14:17;  Status DC


Sodium Chloride 90 meq/Potassium Chloride 15 meq/ Potassium Phosphate 10 mmol/ 

Magnesium Sulfate 10 meq/Calcium Gluconate 20 meq/ Multivitamins 10 ml/Chromium/

Copper/Manganese/ Seleni/Zn 0.5 ml/ Total Parenteral Nutrition/Amino 

Acids/Dextrose/ Fat Emulsion Intravenous 1,200 ml @  50 mls/hr TPN  CONT IV  

Last administered on 3/22/20at 23:29;  Start 3/22/20 at 22:00;  Stop 3/23/20 at 

21:59;  Status DC


Sodium Chloride 1,000 ml @  1,000 mls/hr Q1H PRN IV hypotension;  Start 3/23/20 

at 07:28;  Stop 3/23/20 at 13:27;  Status DC


Albumin Human 200 ml @  200 mls/hr 1X  ONCE IV  Last administered on 3/23/20at 

08:51;  Start 3/23/20 at 07:30;  Stop 3/23/20 at 08:29;  Status DC


Diphenhydramine HCl (Benadryl) 25 mg 1X PRN  PRN IV ITCHING;  Start 3/23/20 at 

07:30;  Stop 3/24/20 at 07:29;  Status DC


Diphenhydramine HCl (Benadryl) 25 mg 1X PRN  PRN IV ITCHING;  Start 3/23/20 at 

07:30;  Stop 3/24/20 at 07:29;  Status DC


Sodium Chloride 1,000 ml @  400 mls/hr Q2H30M PRN IV PATENCY;  Start 3/23/20 at 

07:28;  Stop 3/23/20 at 19:27;  Status DC


Info (PHARMACY MONITORING -- do not chart) 1 each PRN DAILY  PRN MC SEE 

COMMENTS;  Start 3/23/20 at 07:30;  Stop 4/3/20 at 13:01;  Status DC


Metronidazole 100 ml @  100 mls/hr Q6HRS IV  Last administered on 4/8/20at 

06:26;  Start 3/23/20 at 08:30;  Stop 4/8/20 at 09:58;  Status DC


Micafungin Sodium 100 mg/Dextrose 100 ml @  100 mls/hr Q24H IV  Last 

administered on 4/30/20at 08:18;  Start 3/23/20 at 09:00;  Stop 4/30/20 at 

20:58;  Status DC


Propofol 0 ml @ As Directed STK-MED ONCE IV ;  Start 3/23/20 at 07:53;  Stop 

3/23/20 at 07:53;  Status DC


Etomidate (Amidate) 20 mg STK-MED ONCE IV ;  Start 3/23/20 at 07:53;  Stop 

3/23/20 at 07:54;  Status DC


Midazolam HCl (Versed) 5 mg STK-MED ONCE .ROUTE ;  Start 3/23/20 at 07:57;  Stop

3/23/20 at 07:57;  Status DC


Fentanyl Citrate 30 ml @ 0 mls/hr CONT  PRN IV SEE PROTOCOL Last administered on

4/17/20at 06:12;  Start 3/23/20 at 08:15;  Stop 4/17/20 at 09:19;  Status DC


Artificial Tears (Artificial Tears) 1 drop PRN Q1HR  PRN OU DRY EYE, 1st choice;

 Start 3/23/20 at 08:15;  Stop 4/29/20 at 05:31;  Status DC


Midazolam HCl 50 mg/Sodium Chloride 50 ml @ 0 mls/hr CONT  PRN IV SEE PROTOCOL 

Last administered on 3/26/20at 22:39;  Start 3/23/20 at 08:15;  Stop 3/28/20 at 

15:59;  Status DC


Etomidate (Amidate) 8 mg 1X  ONCE IV  Last administered on 3/23/20at 08:33;  

Start 3/23/20 at 08:30;  Stop 3/23/20 at 08:31;  Status DC


Succinylcholine Chloride (Anectine) 120 mg 1X  ONCE IV  Last administered on 

3/23/20at 08:34;  Start 3/23/20 at 08:30;  Stop 3/23/20 at 08:31;  Status DC


Midazolam HCl (Versed) 5 mg 1X  ONCE IV ;  Start 3/23/20 at 08:30;  Stop 3/23/20

at 08:31;  Status DC


Potassium Chloride 15 meq/ Bicarbonate Dialysis Soln w/ out KCl 5,007.5 ml  @ 

1,000 mls/ hr Q5H1M IV  Last administered on 3/24/20at 11:11;  Start 3/23/20 at 

12:00;  Stop 3/24/20 at 11:15;  Status DC


Potassium Chloride 15 meq/ Bicarbonate Dialysis Soln w/ out KCl 5,007.5 ml  @ 

1,000 mls/ hr Q5H1M IV  Last administered on 3/24/20at 11:12;  Start 3/23/20 at 

12:00;  Stop 3/24/20 at 11:17;  Status DC


Potassium Chloride 15 meq/ Bicarbonate Dialysis Soln w/ out KCl 5,007.5 ml  @ 

1,000 mls/ hr Q5H1M IV  Last administered on 3/24/20at 11:11;  Start 3/23/20 at 

12:00;  Stop 3/24/20 at 11:19;  Status DC


Sodium Chloride 90 meq/Potassium Chloride 15 meq/ Potassium Phosphate 10 mmol/ 

Magnesium Sulfate 10 meq/Calcium Gluconate 20 meq/ Multivitamins 10 ml/Chromium/

Copper/Manganese/ Seleni/Zn 0.5 ml/ Total Parenteral Nutrition/Amino 

Acids/Dextrose/ Fat Emulsion Intravenous 1,400 ml @  58.333 mls/ hr TPN  CONT IV

 Last administered on 3/23/20at 21:42;  Start 3/23/20 at 22:00;  Stop 3/24/20 at

21:59;  Status DC


Heparin Sodium (Porcine) (Heparin Sodium) 5,000 unit Q8HRS SQ  Last administered

on 3/28/20at 05:55;  Start 3/23/20 at 15:00;  Stop 3/28/20 at 13:28;  Status DC


Meropenem 500 mg/ Sodium Chloride 50 ml @  100 mls/hr Q6HRS IV  Last 

administered on 3/25/20at 06:00;  Start 3/24/20 at 09:00;  Stop 3/25/20 at 

07:29;  Status DC


Potassium Phosphate 20 mmol/ Sodium Chloride 106.6667 ml @  51.667 m... 1X  ONCE

IV  Last administered on 3/24/20at 11:22;  Start 3/24/20 at 10:15;  Stop 3/24/20

at 12:18;  Status DC


Acetaminophen (Tylenol Supp) 650 mg PRN Q6HRS  PRN OR MILD PAIN / TEMP > 100.3'F

Last administered on 7/22/20at 20:53;  Start 3/24/20 at 10:30


Potassium Chloride/Water 100 ml @  100 mls/hr Q1H IV  Last administered on 

3/24/20at 12:12;  Start 3/24/20 at 11:00;  Stop 3/24/20 at 12:59;  Status DC


Potassium Chloride 20 meq/ Bicarbonate Dialysis Soln w/ out KCl 5,010 ml @  

1,000 mls/hr Q5H1M IV  Last administered on 3/25/20at 08:48;  Start 3/24/20 at 

12:00;  Stop 3/25/20 at 13:03;  Status DC


Potassium Chloride 20 meq/ Bicarbonate Dialysis Soln w/ out KCl 5,010 ml @  

1,000 mls/hr Q5H1M IV  Last administered on 3/29/20at 14:52;  Start 3/24/20 at 

11:30;  Stop 3/29/20 at 19:59;  Status DC


Potassium Chloride 20 meq/ Bicarbonate Dialysis Soln w/ out KCl 5,010 ml @  

1,000 mls/hr Q5H1M IV  Last administered on 3/29/20at 14:53;  Start 3/24/20 at 

11:30;  Stop 3/29/20 at 19:59;  Status DC


Sodium Chloride 90 meq/Potassium Chloride 15 meq/ Potassium Phosphate 15 mmol/ 

Magnesium Sulfate 10 meq/Calcium Gluconate 15 meq/ Multivitamins 10 ml/Chromium/

Copper/Manganese/ Seleni/Zn 0.5 ml/ Total Parenteral Nutrition/Amino 

Acids/Dextrose/ Fat Emulsion Intravenous 1,400 ml @  58.333 mls/ hr TPN  CONT IV

 Last administered on 3/24/20at 22:17;  Start 3/24/20 at 22:00;  Stop 3/25/20 at

21:59;  Status DC


Cefepime HCl (Maxipime) 2 gm Q12HR IVP  Last administered on 4/7/20at 20:56;  

Start 3/25/20 at 09:00;  Stop 4/8/20 at 09:58;  Status DC


Daptomycin 500 mg/ Sodium Chloride 50 ml @  100 mls/hr Q48H IV  Last 

administered on 4/10/20at 09:57;  Start 3/25/20 at 08:30;  Stop 4/10/20 at 

10:07;  Status DC


Lidocaine HCl (Buffered Lidocaine 1%) 3 ml 1X  ONCE INJ  Last administered on 

3/25/20at 10:27;  Start 3/25/20 at 10:30;  Stop 3/25/20 at 10:31;  Status DC


Potassium Phosphate 20 mmol/ Sodium Chloride 106.6667 ml @  51.667 m... 1X  ONCE

IV  Last administered on 3/25/20at 12:51;  Start 3/25/20 at 13:00;  Stop 3/25/20

at 15:03;  Status DC


Sodium Chloride 90 meq/Potassium Chloride 15 meq/ Potassium Phosphate 18 mmol/ 

Magnesium Sulfate 8 meq/Calcium Gluconate 15 meq/ Multivitamins 10 ml/Chromium/ 

Copper/Manganese/ Seleni/Zn 0.5 ml/ Total Parenteral Nutrition/Amino 

Acids/Dextrose/ Fat Emulsion Intravenous 1,400 ml @  58.333 mls/ hr TPN  CONT IV

 Last administered on 3/25/20at 22:16;  Start 3/25/20 at 22:00;  Stop 3/26/20 at

21:59;  Status DC


Potassium Chloride 20 meq/ Bicarbonate Dialysis Soln w/ out KCl 5,010 ml @  

1,000 mls/hr Q5H1M IV  Last administered on 3/29/20at 14:54;  Start 3/25/20 at 

16:00;  Stop 3/29/20 at 19:59;  Status DC


Multi-Ingred Cream/Lotion/Oil/ Oint (Artificial Tears Eye Ointment) 1 thomas PRN 

Q1HR  PRN OU DRY EYE, 2nd choice Last administered on 4/13/20at 08:19;  Start 

3/25/20 at 17:30;  Stop 6/3/20 at 14:39;  Status DC


Sodium Chloride 90 meq/Potassium Chloride 15 meq/ Potassium Phosphate 18 mmol/ 

Magnesium Sulfate 8 meq/Calcium Gluconate 15 meq/ Multivitamins 10 ml/Chromium/ 

Copper/Manganese/ Seleni/Zn 0.5 ml/ Total Parenteral Nutrition/Amino Acids/Dex

trose/ Fat Emulsion Intravenous 1,400 ml @  58.333 mls/ hr TPN  CONT IV  Last 

administered on 3/26/20at 22:00;  Start 3/26/20 at 22:00;  Stop 3/27/20 at 

21:59;  Status DC


Albumin Human 500 ml @  125 mls/hr 1X  ONCE IV ;  Start 3/26/20 at 14:15;  Stop 

3/26/20 at 18:14;  Status DC


Sodium Chloride 90 meq/Potassium Chloride 15 meq/ Potassium Phosphate 18 mmol/ 

Magnesium Sulfate 8 meq/Calcium Gluconate 15 meq/ Multivitamins 10 ml/Chromium/ 

Copper/Manganese/ Seleni/Zn 0.5 ml/ Insulin Human Regular 10 unit/ Total 

Parenteral Nutrition/Amino Acids/Dextrose/ Fat Emulsion Intravenous 1,400 ml @  

58.333 mls/ hr TPN  CONT IV  Last administered on 3/27/20at 21:43;  Start 

3/27/20 at 22:00;  Stop 3/28/20 at 21:59;  Status DC


Lidocaine HCl (Buffered Lidocaine 1%) 3 ml STK-MED ONCE .ROUTE ;  Start 3/25/20 

at 10:00;  Stop 3/27/20 at 13:57;  Status DC


Midazolam HCl 100 mg/Sodium Chloride 100 ml @ 7 mls/hr CONT  PRN IV SEE PROTOCOL

Last administered on 4/8/20at 15:35;  Start 3/28/20 at 16:00;  Stop 6/3/20 at 

14:38;  Status DC


Sodium Chloride 90 meq/Potassium Chloride 15 meq/ Potassium Phosphate 18 mmol/ 

Magnesium Sulfate 8 meq/Calcium Gluconate 15 meq/ Multivitamins 10 ml/Chromium/ 

Copper/Manganese/ Seleni/Zn 0.5 ml/ Insulin Human Regular 15 unit/ Total 

Parenteral Nutrition/Amino Acids/Dextrose/ Fat Emulsion Intravenous 1,400 ml @  

58.333 mls/ hr TPN  CONT IV  Last administered on 3/28/20at 20:34;  Start 3

/28/20 at 22:00;  Stop 3/29/20 at 21:59;  Status DC


Info ([facility] Electrolyte Protocol) 1 ea CONT PRN  PRN MC PER PROTOCOL;  Start 3

/29/20 at 13:15


Sodium Chloride 90 meq/Potassium Chloride 15 meq/ Potassium Phosphate 18 mmol/ 

Magnesium Sulfate 8 meq/Calcium Gluconate 15 meq/ Multivitamins 10 ml/Chromium/ 

Copper/Manganese/ Seleni/Zn 0.5 ml/ Insulin Human Regular 15 unit/ Total 

Parenteral Nutrition/Amino Acids/Dextrose/ Fat Emulsion Intravenous 1,400 ml @  

58.333 mls/ hr TPN  CONT IV  Last administered on 3/29/20at 22:05;  Start 

3/29/20 at 22:00;  Stop 3/30/20 at 21:59;  Status DC


Potassium Chloride 15 meq/ Bicarbonate Dialysis Soln w/ out KCl 5,007.5 ml  @ 

1,000 mls/ hr Q5H1M IV  Last administered on 4/1/20at 18:14;  Start 3/29/20 at 

20:00;  Stop 4/2/20 at 13:08;  Status DC


Potassium Chloride 15 meq/ Bicarbonate Dialysis Soln w/ out KCl 5,007.5 ml  @ 

1,000 mls/ hr Q5H1M IV  Last administered on 4/1/20at 18:14;  Start 3/29/20 at 

20:00;  Stop 4/2/20 at 13:08;  Status DC


Potassium Chloride 15 meq/ Bicarbonate Dialysis Soln w/ out KCl 5,007.5 ml  @ 

1,000 mls/ hr Q5H1M IV  Last administered on 4/1/20at 18:14;  Start 3/29/20 at 

20:00;  Stop 4/2/20 at 13:08;  Status DC


Iohexol (Omnipaque 240 Mg/ml) 30 ml 1X  ONCE PO  Last administered on 3/30/20at 

11:30;  Start 3/30/20 at 11:30;  Stop 3/30/20 at 11:33;  Status DC


Info (CONTRAST GIVEN -- Rx MONITORING) 1 each PRN DAILY  PRN MC SEE COMMENTS;  

Start 3/30/20 at 11:45;  Stop 4/1/20 at 11:44;  Status DC


Sodium Chloride 90 meq/Potassium Chloride 15 meq/ Potassium Phosphate 18 mmol/ 

Magnesium Sulfate 8 meq/Calcium Gluconate 15 meq/ Multivitamins 10 ml/Chromium/ 

Copper/Manganese/ Seleni/Zn 0.5 ml/ Insulin Human Regular 15 unit/ Total 

Parenteral Nutrition/Amino Acids/Dextrose/ Fat Emulsion Intravenous 1,400 ml @  

58.333 mls/ hr TPN  CONT IV  Last administered on 3/30/20at 21:47;  Start 

3/30/20 at 22:00;  Stop 3/31/20 at 21:59;  Status DC


Sodium Chloride 90 meq/Potassium Chloride 15 meq/ Potassium Phosphate 18 mmol/ 

Magnesium Sulfate 8 meq/Calcium Gluconate 15 meq/ Multivitamins 10 ml/Chromium/ 

Copper/Manganese/ Seleni/Zn 0.5 ml/ Insulin Human Regular 20 unit/ Total 

Parenteral Nutrition/Amino Acids/Dextrose/ Fat Emulsion Intravenous 1,400 ml @  

58.333 mls/ hr TPN  CONT IV  Last administered on 3/31/20at 21:36;  Start 

3/31/20 at 22:00;  Stop 4/1/20 at 21:59;  Status DC


Alteplase, Recombinant (Cathflo For Central Catheter Clearance) 1 mg 1X  ONCE 

INT CAT  Last administered on 3/31/20at 20:03;  Start 3/31/20 at 19:30;  Stop 

3/31/20 at 19:46;  Status DC


Alteplase, Recombinant (Cathflo For Central Catheter Clearance) 1 mg 1X  ONCE 

INT CAT  Last administered on 3/31/20at 22:05;  Start 3/31/20 at 22:00;  Stop 

3/31/20 at 22:01;  Status DC


Sodium Chloride 90 meq/Potassium Chloride 15 meq/ Potassium Phosphate 18 mmol/ 

Magnesium Sulfate 8 meq/Calcium Gluconate 15 meq/ Multivitamins 10 ml/Chromium/ 

Copper/Manganese/ Seleni/Zn 0.5 ml/ Insulin Human Regular 20 unit/ Total Pare

nteral Nutrition/Amino Acids/Dextrose/ Fat Emulsion Intravenous 1,400 ml @  

58.333 mls/ hr TPN  CONT IV  Last administered on 4/1/20at 21:30;  Start 4/1/20 

at 22:00;  Stop 4/2/20 at 21:59;  Status DC


Dexmedetomidine HCl 400 mcg/ Sodium Chloride 100 ml @ 0 mls/hr CONT  PRN IV 

ANXIETY / AGITATION Last administered on 5/30/20at 12:57;  Start 4/2/20 at 

08:15;  Stop 5/30/20 at 18:31;  Status DC


Sodium Chloride 500 ml @  500 mls/hr 1X PRN  PRN IV ELEVATED BP, SEE COMMENTS;  

Start 4/2/20 at 08:15


Atropine Sulfate (ATROPINE 0.5mg SYRINGE) 0.5 mg PRN Q5MIN  PRN IV SEE COMMENTS;

 Start 4/2/20 at 08:15


Furosemide (Lasix) 20 mg 1X  ONCE IVP  Last administered on 4/2/20at 08:19;  

Start 4/2/20 at 08:15;  Stop 4/2/20 at 08:16;  Status DC


Lidocaine HCl (Buffered Lidocaine 1%) 3 ml STK-MED ONCE .ROUTE ;  Start 4/2/20 

at 08:39;  Stop 4/2/20 at 08:39;  Status DC


Lidocaine HCl (Buffered Lidocaine 1%) 6 ml 1X  ONCE INJ  Last administered on 

4/2/20at 09:05;  Start 4/2/20 at 09:00;  Stop 4/2/20 at 09:06;  Status DC


Sodium Chloride 90 meq/Potassium Chloride 15 meq/ Potassium Phosphate 18 mmol/ 

Magnesium Sulfate 8 meq/Calcium Gluconate 15 meq/ Multivitamins 10 ml/Chromium/ 

Copper/Manganese/ Seleni/Zn 0.5 ml/ Insulin Human Regular 20 unit/ Total 

Parenteral Nutrition/Amino Acids/Dextrose/ Fat Emulsion Intravenous 1,400 ml @  

58.333 mls/ hr TPN  CONT IV  Last administered on 4/2/20at 22:45;  Start 4/2/20 

at 22:00;  Stop 4/3/20 at 21:59;  Status DC


Sodium Chloride 1,000 ml @  1,000 mls/hr Q1H PRN IV hypotension;  Start 4/3/20 

at 07:30;  Stop 4/3/20 at 13:29;  Status DC


Albumin Human 200 ml @  200 mls/hr 1X PRN  PRN IV Hypotension Last administered 

on 4/3/20at 09:36;  Start 4/3/20 at 07:30;  Stop 4/3/20 at 13:29;  Status DC


Sodium Chloride (Normal Saline Flush) 10 ml 1X PRN  PRN IV AP catheter pack;  

Start 4/3/20 at 07:30;  Stop 4/3/20 at 21:29;  Status DC


Sodium Chloride (Normal Saline Flush) 10 ml 1X PRN  PRN IV  catheter pack;  

Start 4/3/20 at 07:30;  Stop 4/4/20 at 07:29;  Status DC


Sodium Chloride 1,000 ml @  400 mls/hr Q2H30M PRN IV PATENCY;  Start 4/3/20 at 

07:30;  Stop 4/3/20 at 19:29;  Status DC


Info (PHARMACY MONITORING -- do not chart) 1 each PRN DAILY  PRN MC SEE 

COMMENTS;  Start 4/3/20 at 07:30;  Stop 4/3/20 at 13:02;  Status DC


Info (PHARMACY MONITORING -- do not chart) 1 each PRN DAILY  PRN MC SEE 

COMMENTS;  Start 4/3/20 at 07:30;  Stop 4/5/20 at 12:45;  Status DC


Sodium Chloride 90 meq/Potassium Chloride 15 meq/ Potassium Phosphate 10 mmol/ 

Magnesium Sulfate 8 meq/Calcium Gluconate 15 meq/ Multivitamins 10 ml/Chromium/ 

Copper/Manganese/ Seleni/Zn 0.5 ml/ Insulin Human Regular 25 unit/ Total 

Parenteral Nutrition/Amino Acids/Dextrose/ Fat Emulsion Intravenous 1,400 ml @  

58.333 mls/ hr TPN  CONT IV  Last administered on 4/3/20at 22:19;  Start 4/3/20 

at 22:00;  Stop 4/4/20 at 21:59;  Status DC


Heparin Sodium (Porcine) (Heparin Sodium) 5,000 unit Q12HR SQ  Last administered

on 4/26/20at 08:59;  Start 4/3/20 at 21:00;  Stop 4/26/20 at 10:05;  Status DC


Ondansetron HCl (Zofran) 4 mg PRN Q6HRS  PRN IV NAUSEA/VOMITING;  Start 4/6/20 

at 07:00;  Stop 4/7/20 at 06:59;  Status DC


Fentanyl Citrate (Fentanyl 2ml Vial) 25 mcg PRN Q5MIN  PRN IV MILD PAIN 1-3;  

Start 4/6/20 at 07:00;  Stop 4/7/20 at 06:59;  Status DC


Fentanyl Citrate (Fentanyl 2ml Vial) 50 mcg PRN Q5MIN  PRN IV MODERATE TO SEVERE

PAIN;  Start 4/6/20 at 07:00;  Stop 4/7/20 at 06:59;  Status DC


Ringer's Solution 1,000 ml @  30 mls/hr Q24H IV ;  Start 4/6/20 at 07:00;  Stop 

4/6/20 at 18:59;  Status DC


Lidocaine HCl (Xylocaine-Mpf 1% 2ml Vial) 2 ml PRN 1X  PRN ID PRIOR TO IV START;

 Start 4/6/20 at 07:00;  Stop 4/7/20 at 06:59;  Status DC


Prochlorperazine Edisylate (Compazine) 5 mg PACU PRN  PRN IV NAUSEA, MRX1;  

Start 4/6/20 at 07:00;  Stop 4/7/20 at 06:59;  Status DC


Sodium Chloride 1,000 ml @  1,000 mls/hr Q1H PRN IV hypotension;  Start 4/4/20 

at 09:10;  Stop 4/4/20 at 15:09;  Status DC


Albumin Human 200 ml @  200 mls/hr 1X PRN  PRN IV Hypotension Last administered 

on 4/4/20at 10:10;  Start 4/4/20 at 09:15;  Stop 4/4/20 at 15:14;  Status DC


Sodium Chloride 1,000 ml @  400 mls/hr Q2H30M PRN IV PATENCY;  Start 4/4/20 at 

09:10;  Stop 4/4/20 at 21:09;  Status DC


Info (PHARMACY MONITORING -- do not chart) 1 each PRN DAILY  PRN MC SEE 

COMMENTS;  Start 4/4/20 at 09:15;  Stop 4/5/20 at 12:45;  Status DC


Info (PHARMACY MONITORING -- do not chart) 1 each PRN DAILY  PRN MC SEE 

COMMENTS;  Start 4/4/20 at 09:15;  Stop 4/5/20 at 12:45;  Status DC


Sodium Chloride 90 meq/Potassium Chloride 15 meq/ Potassium Phosphate 10 mmol/ 

Magnesium Sulfate 8 meq/Calcium Gluconate 15 meq/ Multivitamins 10 ml/Chromium/ 

Copper/Manganese/ Seleni/Zn 0.5 ml/ Insulin Human Regular 25 unit/ Total 

Parenteral Nutrition/Amino Acids/Dextrose/ Fat Emulsion Intravenous 1,400 ml @  

58.333 mls/ hr TPN  CONT IV  Last administered on 4/4/20at 22:10;  Start 4/4/20 

at 22:00;  Stop 4/5/20 at 21:59;  Status DC


Magnesium Sulfate 50 ml @ 25 mls/hr PRN DAILY  PRN IV for Mag < 1.7 on am labs 

Last administered on 6/18/20at 10:57;  Start 4/5/20 at 09:15


Sodium Chloride 90 meq/Potassium Chloride 15 meq/ Potassium Phosphate 10 mmol/ 

Magnesium Sulfate 8 meq/Calcium Gluconate 15 meq/ Multivitamins 10 ml/Chromium/ 

Copper/Manganese/ Seleni/Zn 0.5 ml/ Insulin Human Regular 25 unit/ Total 

Parenteral Nutrition/Amino Acids/Dextrose/ Fat Emulsion Intravenous 1,400 ml @  

58.333 mls/ hr TPN  CONT IV  Last administered on 4/5/20at 21:20;  Start 4/5/20 

at 22:00;  Stop 4/6/20 at 21:59;  Status DC


Sodium Chloride 1,000 ml @  1,000 mls/hr Q1H PRN IV hypotension;  Start 4/5/20 

at 12:23;  Stop 4/5/20 at 18:22;  Status DC


Albumin Human 200 ml @  200 mls/hr 1X  ONCE IV  Last administered on 4/5/20at 

13:34;  Start 4/5/20 at 12:30;  Stop 4/5/20 at 13:29;  Status DC


Diphenhydramine HCl (Benadryl) 25 mg 1X PRN  PRN IV ITCHING;  Start 4/5/20 at 

12:30;  Stop 4/6/20 at 12:29;  Status DC


Diphenhydramine HCl (Benadryl) 25 mg 1X PRN  PRN IV ITCHING;  Start 4/5/20 at 

12:30;  Stop 4/6/20 at 12:29;  Status DC


Info (PHARMACY MONITORING -- do not chart) 1 each PRN DAILY  PRN MC SEE 

COMMENTS;  Start 4/5/20 at 12:30;  Status Cancel


Bupivacaine HCl/ Epinephrine Bitart (Sensorcain-Epi 0.5%-1:[id number] Mpf) 30 ml 

STK-MED ONCE .ROUTE  Last administered on 4/6/20at 11:44;  Start 4/6/20 at 

11:00;  Stop 4/6/20 at 11:01;  Status DC


Cellulose (Surgicel Fibrillar 1x2) 1 each STK-MED ONCE .ROUTE ;  Start 4/6/20 at

11:00;  Stop 4/6/20 at 11:01;  Status DC


Sodium Chloride 90 meq/Potassium Chloride 15 meq/ Potassium Phosphate 10 mmol/ 

Magnesium Sulfate 12 meq/Calcium Gluconate 15 meq/ Multivitamins 10 ml/Chromium/

Copper/Manganese/ Seleni/Zn 0.5 ml/ Insulin Human Regular 25 unit/ Total 

Parenteral Nutrition/Amino Acids/Dextrose/ Fat Emulsion Intravenous 1,400 ml @  

58.333 mls/ hr TPN  CONT IV  Last administered on 4/6/20at 22:24;  Start 4/6/20 

at 22:00;  Stop 4/7/20 at 21:59;  Status DC


Propofol 20 ml @ As Directed STK-MED ONCE IV ;  Start 4/6/20 at 11:07;  Stop 

4/6/20 at 11:07;  Status DC


Cellulose (Surgicel Hemostat 4x8) 1 each STK-MED ONCE .ROUTE  Last administered 

on 4/6/20at 11:44;  Start 4/6/20 at 11:55;  Stop 4/6/20 at 11:56;  Status DC


Sevoflurane (Ultane) 60 ml STK-MED ONCE IH ;  Start 4/6/20 at 12:46;  Stop 

4/6/20 at 12:46;  Status DC


Sodium Chloride 1,000 ml @  1,000 mls/hr Q1H PRN IV hypotension;  Start 4/6/20 

at 13:51;  Stop 4/6/20 at 19:50;  Status DC


Albumin Human 200 ml @  200 mls/hr 1X PRN  PRN IV Hypotension Last administered 

on 4/6/20at 14:51;  Start 4/6/20 at 14:00;  Stop 4/6/20 at 19:59;  Status DC


Diphenhydramine HCl (Benadryl) 25 mg 1X PRN  PRN IV ITCHING;  Start 4/6/20 at 

14:00;  Stop 4/7/20 at 13:59;  Status DC


Diphenhydramine HCl (Benadryl) 25 mg 1X PRN  PRN IV ITCHING;  Start 4/6/20 at 

14:00;  Stop 4/7/20 at 13:59;  Status DC


Sodium Chloride 1,000 ml @  400 mls/hr Q2H30M PRN IV PATENCY;  Start 4/6/20 at 

13:51;  Stop 4/7/20 at 01:50;  Status DC


Info (PHARMACY MONITORING -- do not chart) 1 each PRN DAILY  PRN MC SEE 

COMMENTS;  Start 4/6/20 at 14:00;  Stop 4/9/20 at 08:16;  Status DC


Heparin Sodium (Porcine) (Hep Lock Adult) 500 unit STK-MED ONCE IVP ;  Start 4/ 7/20 at 09:29;  Stop 4/7/20 at 09:30;  Status DC


Sodium Chloride 1,000 ml @  1,000 mls/hr Q1H PRN IV hypotension;  Start 4/7/20 

at 10:43;  Stop 4/7/20 at 16:42;  Status DC


Sodium Chloride 1,000 ml @  400 mls/hr Q2H30M PRN IV PATENCY;  Start 4/7/20 at 

10:43;  Stop 4/7/20 at 22:42;  Status DC


Info (PHARMACY MONITORING -- do not chart) 1 each PRN DAILY  PRN MC SEE 

COMMENTS;  Start 4/7/20 at 10:45;  Status UNV


Info (PHARMACY MONITORING -- do not chart) 1 each PRN DAILY  PRN MC SEE 

COMMENTS;  Start 4/7/20 at 10:45;  Status UNV


Sodium Chloride 90 meq/Potassium Chloride 15 meq/ Magnesium Sulfate 12 

meq/Calcium Gluconate 15 meq/ Multivitamins 10 ml/Chromium/ Copper/Manganese/ 

Seleni/Zn 0.5 ml/ Insulin Human Regular 25 unit/ Total Parenteral 

Nutrition/Amino Acids/Dextrose/ Fat Emulsion Intravenous 1,400 ml @  58.333 mls/

hr TPN  CONT IV  Last administered on 4/7/20at 22:13;  Start 4/7/20 at 22:00;  

Stop 4/8/20 at 21:59;  Status DC


Sodium Chloride 1,000 ml @  1,000 mls/hr Q1H PRN IV hypotension;  Start 4/8/20 

at 07:50;  Stop 4/8/20 at 13:49;  Status DC


Albumin Human 200 ml @  200 mls/hr 1X  ONCE IV ;  Start 4/8/20 at 08:00;  Stop 

4/8/20 at 08:53;  Status DC


Diphenhydramine HCl (Benadryl) 25 mg 1X PRN  PRN IV ITCHING;  Start 4/8/20 at 

08:00;  Stop 4/9/20 at 07:59;  Status DC


Diphenhydramine HCl (Benadryl) 25 mg 1X PRN  PRN IV ITCHING;  Start 4/8/20 at 

08:00;  Stop 4/9/20 at 07:59;  Status DC


Info (PHARMACY MONITORING -- do not chart) 1 each PRN DAILY  PRN MC SEE 

COMMENTS;  Start 4/8/20 at 08:00;  Stop 4/9/20 at 08:16;  Status DC


Albumin Human 50 ml @ 50 mls/hr 1X  ONCE IV ;  Start 4/8/20 at 08:53;  Stop 

4/8/20 at 08:56;  Status DC


Albumin Human 200 ml @  50 mls/hr PRN 1X  PRN IV HYPOTENSION Last administered 

on 4/14/20at 11:54;  Start 4/8/20 at 09:00;  Stop 5/21/20 at 11:14;  Status DC


Meropenem 500 mg/ Sodium Chloride 50 ml @  100 mls/hr Q12H IV  Last administered

on 4/28/20at 10:45;  Start 4/8/20 at 10:00;  Stop 4/28/20 at 12:37;  Status DC


Sodium Chloride 90 meq/Magnesium Sulfate 12 meq/ Calcium Gluconate 15 meq/ 

Multivitamins 10 ml/Chromium/ Copper/Manganese/ Seleni/Zn 0.5 ml/ Insulin Human 

Regular 25 unit/ Total Parenteral Nutrition/Amino Acids/Dextrose/ Fat Emulsion 

Intravenous 1,400 ml @  58.333 mls/ hr TPN  CONT IV  Last administered on 

4/8/20at 21:41;  Start 4/8/20 at 22:00;  Stop 4/9/20 at 21:59;  Status DC


Sodium Chloride 1,000 ml @  1,000 mls/hr Q1H PRN IV hypotension;  Start 4/9/20 

at 07:58;  Stop 4/9/20 at 13:57;  Status DC


Albumin Human 200 ml @  200 mls/hr 1X PRN  PRN IV Hypotension Last administered 

on 4/9/20at 09:30;  Start 4/9/20 at 08:00;  Stop 4/9/20 at 13:59;  Status DC


Sodium Chloride 1,000 ml @  400 mls/hr Q2H30M PRN IV PATENCY;  Start 4/9/20 at 

07:58;  Stop 4/9/20 at 19:57;  Status DC


Info (PHARMACY MONITORING -- do not chart) 1 each PRN DAILY  PRN MC SEE 

COMMENTS;  Start 4/9/20 at 08:00;  Status Cancel


Info (PHARMACY MONITORING -- do not chart) 1 each PRN DAILY  PRN MC SEE COM

MENTS;  Start 4/9/20 at 08:15;  Status UNV


Sodium Chloride 90 meq/Potassium Phosphate 5 mmol/ Magnesium Sulfate 12 

meq/Calcium Gluconate 15 meq/ Multivitamins 10 ml/Chromium/ Copper/Manganese/ 

Seleni/Zn 0.5 ml/ Insulin Human Regular 30 unit/ Total Parenteral 

Nutrition/Amino Acids/Dextrose/ Fat Emulsion Intravenous 1,400 ml @  58.333 mls/

hr TPN  CONT IV  Last administered on 4/9/20at 22:08;  Start 4/9/20 at 22:00;  

Stop 4/10/20 at 21:59;  Status DC


Linezolid/Dextrose 300 ml @  300 mls/hr Q12HR IV  Last administered on 4/20/20at

20:40;  Start 4/10/20 at 11:00;  Stop 4/21/20 at 08:10;  Status DC


Sodium Chloride 90 meq/Potassium Phosphate 15 mmol/ Magnesium Sulfate 12 

meq/Calcium Gluconate 15 meq/ Multivitamins 10 ml/Chromium/ Copper/Manganese/ 

Seleni/Zn 0.5 ml/ Insulin Human Regular 30 unit/ Total Parenteral 

Nutrition/Amino Acids/Dextrose/ Fat Emulsion Intravenous 1,400 ml @  58.333 mls/

hr TPN  CONT IV  Last administered on 4/10/20at 21:49;  Start 4/10/20 at 22:00; 

Stop 4/11/20 at 21:59;  Status DC


Sodium Chloride 90 meq/Potassium Phosphate 15 mmol/ Magnesium Sulfate 12 

meq/Calcium Gluconate 15 meq/ Multivitamins 10 ml/Chromium/ Copper/Manganese/ 

Seleni/Zn 0.5 ml/ Insulin Human Regular 40 unit/ Total Parenteral 

Nutrition/Amino Acids/Dextrose/ Fat Emulsion Intravenous 1,400 ml @  58.333 mls/

hr TPN  CONT IV  Last administered on 4/11/20at 21:21;  Start 4/11/20 at 22:00; 

Stop 4/12/20 at 21:59;  Status DC


Sodium Chloride 1,000 ml @  1,000 mls/hr Q1H PRN IV hypotension;  Start 4/11/20 

at 13:26;  Stop 4/11/20 at 19:25;  Status DC


Albumin Human 200 ml @  200 mls/hr 1X PRN  PRN IV Hypotension Last administered 

on 4/11/20at 15:00;  Start 4/11/20 at 13:30;  Stop 4/11/20 at 19:29;  Status DC


Sodium Chloride (Normal Saline Flush) 10 ml 1X PRN  PRN IV AP catheter pack;  St

art 4/11/20 at 13:30;  Stop 4/12/20 at 13:29;  Status DC


Sodium Chloride (Normal Saline Flush) 10 ml 1X PRN  PRN IV  catheter pack;  

Start 4/11/20 at 13:30;  Stop 4/12/20 at 13:29;  Status DC


Sodium Chloride 1,000 ml @  400 mls/hr Q2H30M PRN IV PATENCY;  Start 4/11/20 at 

13:26;  Stop 4/12/20 at 01:25;  Status DC


Info (PHARMACY MONITORING -- do not chart) 1 each PRN DAILY  PRN MC SEE 

COMMENTS;  Start 4/11/20 at 13:30;  Stop 4/11/20 at 13:33;  Status DC


Info (PHARMACY MONITORING -- do not chart) 1 each PRN DAILY  PRN MC SEE 

COMMENTS;  Start 4/11/20 at 13:30;  Stop 4/11/20 at 13:34;  Status DC


Sodium Chloride 90 meq/Potassium Phosphate 19 mmol/ Magnesium Sulfate 12 

meq/Calcium Gluconate 15 meq/ Multivitamins 10 ml/Chromium/ Copper/Manganese/ 

Seleni/Zn 0.5 ml/ Insulin Human Regular 40 unit/ Total Parenteral 

Nutrition/Amino Acids/Dextrose/ Fat Emulsion Intravenous 1,400 ml @  58.333 mls/

hr TPN  CONT IV  Last administered on 4/12/20at 21:54;  Start 4/12/20 at 22:00; 

Stop 4/13/20 at 21:59;  Status DC


Sodium Chloride 1,000 ml @  1,000 mls/hr Q1H PRN IV hypotension;  Start 4/13/20 

at 09:35;  Stop 4/13/20 at 15:34;  Status DC


Albumin Human 200 ml @  200 mls/hr 1X PRN  PRN IV Hypotension;  Start 4/13/20 at

09:45;  Stop 4/13/20 at 15:44;  Status DC


Diphenhydramine HCl (Benadryl) 25 mg 1X PRN  PRN IV ITCHING;  Start 4/13/20 at 

09:45;  Stop 4/14/20 at 09:44;  Status DC


Diphenhydramine HCl (Benadryl) 25 mg 1X PRN  PRN IV ITCHING;  Start 4/13/20 at 

09:45;  Stop 4/14/20 at 09:44;  Status DC


Sodium Chloride 1,000 ml @  400 mls/hr Q2H30M PRN IV PATENCY;  Start 4/13/20 at 

09:35;  Stop 4/13/20 at 21:34;  Status DC


Info (PHARMACY MONITORING -- do not chart) 1 each PRN DAILY  PRN MC SEE 

COMMENTS;  Start 4/13/20 at 09:45;  Status Cancel


Sodium Chloride 100 meq/Potassium Phosphate 19 mmol/ Magnesium Sulfate 12 

meq/Calcium Gluconate 15 meq/ Multivitamins 10 ml/Chromium/ Copper/Manganese/ 

Seleni/Zn 0.5 ml/ Insulin Human Regular 40 unit/ Potassium Chloride 20 meq/ 

Total Parenteral Nutrition/Amino Acids/Dextrose/ Fat Emulsion Intravenous 1,400 

ml @  58.333 mls/ hr TPN  CONT IV  Last administered on 4/13/20at 22:02;  Start 

4/13/20 at 22:00;  Stop 4/14/20 at 21:59;  Status DC


Furosemide (Lasix) 40 mg 1X  ONCE IVP  Last administered on 4/13/20at 14:39;  

Start 4/13/20 at 14:30;  Stop 4/13/20 at 14:31;  Status DC


Metronidazole 100 ml @  100 mls/hr Q8HRS IV  Last administered on 4/21/20at 

06:04;  Start 4/14/20 at 10:00;  Stop 4/21/20 at 08:10;  Status DC


Sodium Chloride 1,000 ml @  1,000 mls/hr Q1H PRN IV hypotension;  Start 4/14/20 

at 08:00;  Stop 4/14/20 at 13:59;  Status DC


Albumin Human 200 ml @  200 mls/hr 1X PRN  PRN IV Hypotension;  Start 4/14/20 at

08:00;  Stop 4/14/20 at 13:59;  Status DC


Sodium Chloride 1,000 ml @  400 mls/hr Q2H30M PRN IV PATENCY;  Start 4/14/20 at 

08:00;  Stop 4/14/20 at 19:59;  Status DC


Info (PHARMACY MONITORING -- do not chart) 1 each PRN DAILY  PRN MC SEE 

COMMENTS;  Start 4/14/20 at 11:30;  Status UNV


Info (PHARMACY MONITORING -- do not chart) 1 each PRN DAILY  PRN MC SEE 

COMMENTS;  Start 4/14/20 at 11:30;  Stop 4/16/20 at 12:13;  Status DC


Sodium Chloride 100 meq/Potassium Phosphate 19 mmol/ Magnesium Sulfate 12 

meq/Calcium Gluconate 15 meq/ Multivitamins 10 ml/Chromium/ Copper/Manganese/ 

Seleni/Zn 0.5 ml/ Insulin Human Regular 40 unit/ Potassium Chloride 20 meq/ T

otal Parenteral Nutrition/Amino Acids/Dextrose/ Fat Emulsion Intravenous 1,400 

ml @  58.333 mls/ hr TPN  CONT IV  Last administered on 4/14/20at 21:52;  Start 

4/14/20 at 22:00;  Stop 4/15/20 at 21:59;  Status DC


Sodium Chloride (Normal Saline Flush) 10 ml QSHIFT  PRN IV AFTER MEDS AND BLOOD 

DRAWS;  Start 4/14/20 at 15:00;  Stop 5/12/20 at 11:27;  Status DC


Sodium Chloride (Normal Saline Flush) 10 ml PRN Q5MIN  PRN IV AFTER MEDS AND 

BLOOD DRAWS;  Start 4/14/20 at 15:00


Sodium Chloride (Normal Saline Flush) 20 ml PRN Q5MIN  PRN IV AFTER MEDS AND 

BLOOD DRAWS;  Start 4/14/20 at 15:00


Sodium Chloride 100 meq/Potassium Phosphate 19 mmol/ Magnesium Sulfate 12 

meq/Calcium Gluconate 15 meq/ Multivitamins 10 ml/Chromium/ Copper/Manganese/ 

Seleni/Zn 0.5 ml/ Insulin Human Regular 40 unit/ Potassium Chloride 20 meq/ 

Total Parenteral Nutrition/Amino Acids/Dextrose/ Fat Emulsion Intravenous 1,400 

ml @  58.333 mls/ hr TPN  CONT IV  Last administered on 4/15/20at 21:20;  Start 

4/15/20 at 22:00;  Stop 4/16/20 at 21:59;  Status DC


Lidocaine HCl (Buffered Lidocaine 1%) 3 ml STK-MED ONCE .ROUTE ;  Start 4/15/20 

at 13:16;  Stop 4/15/20 at 13:16;  Status DC


Lidocaine HCl (Buffered Lidocaine 1%) 6 ml 1X  ONCE INJ  Last administered on 

4/15/20at 13:45;  Start 4/15/20 at 13:30;  Stop 4/15/20 at 13:31;  Status DC


Albumin Human 100 ml @  100 mls/hr 1X  ONCE IV  Last administered on 4/15/20at 

15:41;  Start 4/15/20 at 15:00;  Stop 4/15/20 at 15:59;  Status DC


Albumin Human 50 ml @ 50 mls/hr 1X  ONCE IV  Last administered on 4/15/20at 

15:00;  Start 4/15/20 at 15:00;  Stop 4/15/20 at 15:59;  Status DC


Info (PHARMACY MONITORING -- do not chart) 1 each PRN DAILY  PRN MC SEE 

COMMENTS;  Start 4/16/20 at 11:30;  Status Cancel


Info (PHARMACY MONITORING -- do not chart) 1 each PRN DAILY  PRN MC SEE C

OMMENTS;  Start 4/16/20 at 11:30;  Status UNV


Sodium Chloride 100 meq/Potassium Phosphate 10 mmol/ Magnesium Sulfate 12 

meq/Calcium Gluconate 15 meq/ Multivitamins 10 ml/Chromium/ Copper/Manganese/ 

Seleni/Zn 0.5 ml/ Insulin Human Regular 35 unit/ Potassium Chloride 20 meq/ 

Total Parenteral Nutrition/Amino Acids/Dextrose/ Fat Emulsion Intravenous 1,400 

ml @  58.333 mls/ hr TPN  CONT IV  Last administered on 4/16/20at 22:10;  Start 

4/16/20 at 22:00;  Stop 4/17/20 at 21:59;  Status DC


Sodium Chloride 100 meq/Potassium Phosphate 5 mmol/ Magnesium Sulfate 12 

meq/Calcium Gluconate 15 meq/ Multivitamins 10 ml/Chromium/ Copper/Manganese/ Se

aram/Zn 0.5 ml/ Insulin Human Regular 35 unit/ Potassium Chloride 20 meq/ Total 

Parenteral Nutrition/Amino Acids/Dextrose/ Fat Emulsion Intravenous 1,400 ml @  

58.333 mls/ hr TPN  CONT IV  Last administered on 4/17/20at 22:59;  Start 

4/17/20 at 22:00;  Stop 4/18/20 at 21:59;  Status DC


Sodium Chloride 1,000 ml @  1,000 mls/hr Q1H PRN IV hypotension;  Start 4/18/20 

at 08:27;  Stop 4/18/20 at 14:26;  Status DC


Albumin Human 200 ml @  200 mls/hr 1X PRN  PRN IV Hypotension Last administered 

on 4/18/20at 09:18;  Start 4/18/20 at 08:30;  Stop 4/18/20 at 14:29;  Status DC


Sodium Chloride 1,000 ml @  400 mls/hr Q2H30M PRN IV PATENCY;  Start 4/18/20 at 

08:27;  Stop 4/18/20 at 20:26;  Status DC


Info (PHARMACY MONITORING -- do not chart) 1 each PRN DAILY  PRN MC SEE CO

MMENTS;  Start 4/18/20 at 08:30;  Status Cancel


Info (PHARMACY MONITORING -- do not chart) 1 each PRN DAILY  PRN MC SEE COMMENTS

;  Start 4/18/20 at 08:30;  Stop 4/26/20 at 13:10;  Status DC


Sodium Chloride 100 meq/Potassium Chloride 40 meq/ Magnesium Sulfate 15 

meq/Calcium Gluconate 15 meq/ Multivitamins 10 ml/Chromium/ Copper/Manganese/ 

Seleni/Zn 0.5 ml/ Insulin Human Regular 35 unit/ Total Parenteral 

Nutrition/Amino Acids/Dextrose/ Fat Emulsion Intravenous 1,400 ml @  58.333 mls/

hr TPN  CONT IV  Last administered on 4/18/20at 22:00;  Start 4/18/20 at 22:00; 

Stop 4/19/20 at 21:59;  Status DC


Potassium Chloride/Water 100 ml @  100 mls/hr 1X  ONCE IV  Last administered on 

4/18/20at 17:28;  Start 4/18/20 at 14:45;  Stop 4/18/20 at 15:44;  Status DC


Sodium Chloride 100 meq/Potassium Chloride 40 meq/ Magnesium Sulfate 15 

meq/Calcium Gluconate 15 meq/ Multivitamins 10 ml/Chromium/ Copper/Manganese/ 

Seleni/Zn 0.5 ml/ Insulin Human Regular 35 unit/ Total Parenteral 

Nutrition/Amino Acids/Dextrose/ Fat Emulsion Intravenous 1,400 ml @  58.333 mls/

hr TPN  CONT IV  Last administered on 4/19/20at 22:46;  Start 4/19/20 at 22:00; 

Stop 4/20/20 at 21:59;  Status DC


Sodium Chloride 100 meq/Potassium Chloride 40 meq/ Magnesium Sulfate 20 

meq/Calcium Gluconate 15 meq/ Multivitamins 10 ml/Chromium/ Copper/Manganese/ 

Seleni/Zn 0.5 ml/ Insulin Human Regular 35 unit/ Total Parenteral 

Nutrition/Amino Acids/Dextrose/ Fat Emulsion Intravenous 1,400 ml @  58.333 mls/

hr TPN  CONT IV  Last administered on 4/20/20at 22:31;  Start 4/20/20 at 22:00; 

Stop 4/21/20 at 21:59;  Status DC


Fentanyl Citrate (Fentanyl 2ml Vial) 50 mcg PRN Q2HR  PRN IVP PAIN Last 

administered on 4/27/20at 13:32;  Start 4/20/20 at 21:00;  Stop 4/28/20 at 

12:53;  Status DC


Fentanyl Citrate (Fentanyl 2ml Vial) 25 mcg PRN Q2HR  PRN IVP PAIN;  Start 

4/20/20 at 21:00;  Stop 4/28/20 at 12:54;  Status DC


Enoxaparin Sodium (Lovenox 100mg Syringe) 100 mg Q12HR SQ ;  Start 4/21/20 at 

21:00;  Status UNV


Amino Acids/ Glycerin/ Electrolytes 1,000 ml @  75 mls/hr [id number] IV ;  Start 

4/20/20 at 21:15;  Status UNV


Sodium Chloride 1,000 ml @  1,000 mls/hr Q1H PRN IV hypotension;  Start 4/21/20 

at 07:56;  Stop 4/21/20 at 13:55;  Status DC


Albumin Human 200 ml @  200 mls/hr 1X PRN  PRN IV Hypotension Last administered 

on 4/21/20at 08:40;  Start 4/21/20 at 08:00;  Stop 4/21/20 at 13:59;  Status DC


Sodium Chloride 1,000 ml @  400 mls/hr Q2H30M PRN IV PATENCY;  Start 4/21/20 at 

07:56;  Stop 4/21/20 at 19:55;  Status DC


Info (PHARMACY MONITORING -- do not chart) 1 each PRN DAILY  PRN MC SEE 

COMMENTS;  Start 4/21/20 at 08:00;  Status UNV


Info (PHARMACY MONITORING -- do not chart) 1 each PRN DAILY  PRN MC SEE 

COMMENTS;  Start 4/21/20 at 08:00;  Status UNV


Daptomycin 430 mg/ Sodium Chloride 50 ml @  100 mls/hr Q24H IV  Last 

administered on 4/21/20at 12:35;  Start 4/21/20 at 09:00;  Stop 4/21/20 at 

12:49;  Status DC


Sodium Chloride 100 meq/Potassium Chloride 40 meq/ Magnesium Sulfate 20 

meq/Calcium Gluconate 15 meq/ Multivitamins 10 ml/Chromium/ Copper/Manganese/ 

Seleni/Zn 0.5 ml/ Insulin Human Regular 35 unit/ Total Parenteral 

Nutrition/Amino Acids/Dextrose/ Fat Emulsion Intravenous 1,400 ml @  58.333 mls/

hr TPN  CONT IV  Last administered on 4/21/20at 21:26;  Start 4/21/20 at 22:00; 

Stop 4/22/20 at 21:59;  Status DC


Daptomycin 430 mg/ Sodium Chloride 50 ml @  100 mls/hr Q48H IV ;  Start 4/23/20 

at 09:00;  Stop 4/22/20 at 11:55;  Status DC


Sodium Chloride 100 meq/Potassium Chloride 40 meq/ Magnesium Sulfate 20 

meq/Calcium Gluconate 15 meq/ Multivitamins 10 ml/Chromium/ Copper/Manganese/ 

Seleni/Zn 0.5 ml/ Insulin Human Regular 35 unit/ Total Parenteral 

Nutrition/Amino Acids/Dextrose/ Fat Emulsion Intravenous 1,400 ml @  58.333 mls/

hr TPN  CONT IV  Last administered on 4/22/20at 22:27;  Start 4/22/20 at 22:00; 

Stop 4/23/20 at 21:59;  Status DC


Daptomycin 430 mg/ Sodium Chloride 50 ml @  100 mls/hr Q24H IV  Last 

administered on 4/24/20at 15:07;  Start 4/22/20 at 13:00;  Stop 4/25/20 at 

13:15;  Status DC


Sodium Chloride 100 meq/Potassium Chloride 40 meq/ Magnesium Sulfate 20 

meq/Calcium Gluconate 10 meq/ Multivitamins 10 ml/Chromium/ Copper/Manganese/ 

Seleni/Zn 0.5 ml/ Insulin Human Regular 35 unit/ Total Parenteral Nutrition

/Amino Acids/Dextrose/ Fat Emulsion Intravenous 1,400 ml @  58.333 mls/ hr TPN  

CONT IV  Last administered on 4/24/20at 00:06;  Start 4/23/20 at 22:00;  Stop 

4/24/20 at 21:59;  Status DC


Alteplase, Recombinant (Cathflo For Central Catheter Clearance) 1 mg 1X  ONCE 

INT CAT  Last administered on 4/24/20at 11:44;  Start 4/24/20 at 10:45;  Stop 

4/24/20 at 10:46;  Status DC


Ondansetron HCl (Zofran) 4 mg PRN Q6HRS  PRN IV NAUSEA/VOMITING;  Start 4/27/20 

at 07:00;  Stop 4/28/20 at 06:59;  Status DC


Fentanyl Citrate (Fentanyl 2ml Vial) 25 mcg PRN Q5MIN  PRN IV MILD PAIN 1-3;  

Start 4/27/20 at 07:00;  Stop 4/28/20 at 06:59;  Status DC


Fentanyl Citrate (Fentanyl 2ml Vial) 50 mcg PRN Q5MIN  PRN IV MODERATE TO SEVERE

PAIN Last administered on 4/27/20at 10:17;  Start 4/27/20 at 07:00;  Stop 

4/28/20 at 06:59;  Status DC


Ringer's Solution 1,000 ml @  30 mls/hr Q24H IV ;  Start 4/27/20 at 07:00;  Stop

4/27/20 at 18:59;  Status DC


Lidocaine HCl (Xylocaine-Mpf 1% 2ml Vial) 2 ml PRN 1X  PRN ID PRIOR TO IV START;

 Start 4/27/20 at 07:00;  Stop 4/28/20 at 06:59;  Status DC


Prochlorperazine Edisylate (Compazine) 5 mg PACU PRN  PRN IV NAUSEA, MRX1;  

Start 4/27/20 at 07:00;  Stop 4/28/20 at 06:59;  Status DC


Sodium Acetate 50 meq/Potassium Acetate 55 meq/ Magnesium Sulfate 20 meq/Calcium

Gluconate 10 meq/ Multivitamins 10 ml/Chromium/ Copper/Manganese/ Seleni/Zn 0.5 

ml/ Insulin Human Regular 35 unit/ Total Parenteral Nutrition/Amino 

Acids/Dextrose/ Fat Emulsion Intravenous 1,400 ml @  58.333 mls/ hr TPN  CONT IV

;  Start 4/24/20 at 22:00;  Stop 4/24/20 at 14:15;  Status DC


Sodium Acetate 50 meq/Potassium Acetate 55 meq/ Magnesium Sulfate 20 meq/Calcium

Gluconate 10 meq/ Multivitamins 10 ml/Chromium/ Copper/Manganese/ Seleni/Zn 0.5 

ml/ Insulin Human Regular 35 unit/ Total Parenteral Nutrition/Amino 

Acids/Dextrose/ Fat Emulsion Intravenous 1,800 ml @  75 mls/hr TPN  CONT IV  

Last administered on 4/24/20at 22:38;  Start 4/24/20 at 22:00;  Stop 4/25/20 at 

21:59;  Status DC


Sodium Chloride 1,000 ml @  1,000 mls/hr Q1H PRN IV hypotension;  Start 4/24/20 

at 15:31;  Stop 4/24/20 at 21:30;  Status DC


Diphenhydramine HCl (Benadryl) 25 mg 1X PRN  PRN IV ITCHING;  Start 4/24/20 at 

15:45;  Stop 4/25/20 at 15:44;  Status DC


Diphenhydramine HCl (Benadryl) 25 mg 1X PRN  PRN IV ITCHING;  Start 4/24/20 at 

15:45;  Stop 4/25/20 at 15:44;  Status DC


Sodium Chloride 1,000 ml @  400 mls/hr Q2H30M PRN IV PATENCY;  Start 4/24/20 at 

15:31;  Stop 4/25/20 at 03:30;  Status DC


Info (PHARMACY MONITORING -- do not chart) 1 each PRN DAILY  PRN MC SEE 

COMMENTS;  Start 4/24/20 at 15:45;  Stop 5/26/20 at 14:14;  Status DC


Sodium Acetate 50 meq/Potassium Acetate 55 meq/ Magnesium Sulfate 20 meq/Calcium

Gluconate 10 meq/ Multivitamins 10 ml/Chromium/ Copper/Manganese/ Seleni/Zn 0.5 

ml/ Insulin Human Regular 35 unit/ Total Parenteral Nutrition/Amino 

Acids/Dextrose/ Fat Emulsion Intravenous 1,800 ml @  75 mls/hr TPN  CONT IV  

Last administered on 4/25/20at 22:03;  Start 4/25/20 at 22:00;  Stop 4/26/20 at 

21:59;  Status DC


Daptomycin 430 mg/ Sodium Chloride 50 ml @  100 mls/hr Q24H IV  Last 

administered on 4/30/20at 13:00;  Start 4/25/20 at 13:00;  Stop 4/30/20 at 

20:58;  Status DC


Heparin Sodium (Porcine) 1000 unit/Sodium Chloride 1,001 ml @  1,001 mls/hr 1X  

ONCE IRR ;  Start 4/27/20 at 06:00;  Stop 4/27/20 at 06:59;  Status DC


Potassium Acetate 55 meq/Magnesium Sulfate 20 meq/ Calcium Gluconate 10 meq/ 

Multivitamins 10 ml/Chromium/ Copper/Manganese/ Seleni/Zn 0.5 ml/ Insulin Human 

Regular 35 unit/ Total Parenteral Nutrition/Amino Acids/Dextrose/ Fat Emulsion 

Intravenous 1,920 ml @  80 mls/hr TPN  CONT IV  Last administered on 4/26/20at 

22:10;  Start 4/26/20 at 22:00;  Stop 4/27/20 at 21:59;  Status DC


Dexamethasone Sodium Phosphate (Decadron) 4 mg STK-MED ONCE .ROUTE ;  Start 

4/27/20 at 10:56;  Stop 4/27/20 at 10:57;  Status DC


Ondansetron HCl (Zofran) 4 mg STK-MED ONCE .ROUTE ;  Start 4/27/20 at 10:56;  

Stop 4/27/20 at 10:57;  Status DC


Rocuronium Bromide (Zemuron) 50 mg STK-MED ONCE .ROUTE ;  Start 4/27/20 at 

10:56;  Stop 4/27/20 at 10:57;  Status DC


Fentanyl Citrate (Fentanyl 2ml Vial) 100 mcg STK-MED ONCE .ROUTE ;  Start 

4/27/20 at 10:56;  Stop 4/27/20 at 10:57;  Status DC


Bupivacaine HCl/ Epinephrine Bitart (Sensorcain-Epi 0.5%-1:646329 Mpf) 30 ml 

STK-MED ONCE .ROUTE  Last administered on 4/27/20at 12:01;  Start 4/27/20 at 10:

58;  Stop 4/27/20 at 10:58;  Status DC


Cellulose (Surgicel Hemostat 2x14) 1 each STK-MED ONCE .ROUTE ;  Start 4/27/20 

at 10:58;  Stop 4/27/20 at 10:59;  Status DC


Iohexol (Omnipaque 300 Mg/ml) 50 ml STK-MED ONCE .ROUTE ;  Start 4/27/20 at 

10:58;  Stop 4/27/20 at 10:59;  Status DC


Cellulose (Surgicel Hemostat 4x8) 1 each STK-MED ONCE .ROUTE ;  Start 4/27/20 at

10:58;  Stop 4/27/20 at 10:59;  Status DC


Bisacodyl (Dulcolax Supp) 10 mg STK-MED ONCE .ROUTE ;  Start 4/27/20 at 10:59;  

Stop 4/27/20 at 10:59;  Status DC


Heparin Sodium (Porcine) 1000 unit/Sodium Chloride 1,001 ml @  1,001 mls/hr 1X  

ONCE IRR ;  Start 4/27/20 at 12:00;  Stop 4/27/20 at 12:59;  Status DC


Propofol 20 ml @ As Directed STK-MED ONCE IV ;  Start 4/27/20 at 11:05;  Stop 

4/27/20 at 11:05;  Status DC


Sevoflurane (Ultane) 90 ml STK-MED ONCE IH ;  Start 4/27/20 at 11:05;  Stop 

4/27/20 at 11:05;  Status DC


Sevoflurane (Ultane) 60 ml STK-MED ONCE IH ;  Start 4/27/20 at 12:26;  Stop 

4/27/20 at 12:27;  Status DC


Propofol 20 ml @ As Directed STK-MED ONCE IV ;  Start 4/27/20 at 12:26;  Stop 

4/27/20 at 12:27;  Status DC


Phenylephrine HCl (PHENYLEPHRINE in 0.9% NACL PF) 1 mg STK-MED ONCE IV ;  Start 

4/27/20 at 12:34;  Stop 4/27/20 at 12:34;  Status DC


Heparin Sodium (Porcine) (Heparin Sodium) 5,000 unit Q12HR SQ  Last administered

on 5/6/20at 20:57;  Start 4/27/20 at 21:00;  Stop 5/7/20 at 09:59;  Status DC


Sodium Chloride (Normal Saline Flush) 3 ml QSHIFT  PRN IV AFTER MEDS AND BLOOD 

DRAWS;  Start 4/27/20 at 13:45;  Status Cancel


Naloxone HCl (Narcan) 0.4 mg PRN Q2MIN  PRN IV SEE INSTRUCTIONS Last 

administered on 6/6/20at 15:15;  Start 4/27/20 at 13:45;  Stop 7/1/20 at 16:00; 

Status DC


Sodium Chloride 1,000 ml @  25 mls/hr Q24H IV  Last administered on 5/26/20at 

13:37;  Start 4/27/20 at 13:37;  Stop 5/29/20 at 13:09;  Status DC


Naloxone HCl (Narcan) 0.4 mg PRN Q2MIN  PRN IV SEE INSTRUCTIONS;  Start 4/27/20 

at 14:30;  Status UNV


Sodium Chloride 1,000 ml @  25 mls/hr Q24H IV ;  Start 4/27/20 at 14:30;  Status

UNV


Hydromorphone HCl 30 ml @ 0 mls/hr CONT PRN  PRN IV PER PROTOCOL Last 

administered on 5/2/20at 16:08;  Start 4/27/20 at 14:30;  Stop 5/4/20 at 08:55; 

Status DC


Potassium Acetate 55 meq/Magnesium Sulfate 20 meq/ Calcium Gluconate 10 meq/ 

Multivitamins 10 ml/Chromium/ Copper/Manganese/ Seleni/Zn 0.5 ml/ Insulin Human 

Regular 35 unit/ Total Parenteral Nutrition/Amino Acids/Dextrose/ Fat Emulsion 

Intravenous 1,920 ml @  80 mls/hr TPN  CONT IV  Last administered on 4/27/20at 

22:01;  Start 4/27/20 at 22:00;  Stop 4/28/20 at 21:59;  Status DC


Bumetanide (Bumex) 2 mg BID92 IV  Last administered on 5/1/20at 13:50;  Start 

4/28/20 at 14:00;  Stop 5/2/20 at 14:10;  Status DC


Meropenem 1 gm/ Sodium Chloride 100 ml @  200 mls/hr Q8HRS IV  Last administered

on 5/22/20at 05:53;  Start 4/28/20 at 14:00;  Stop 5/22/20 at 09:31;  Status DC


Potassium Acetate 55 meq/Magnesium Sulfate 20 meq/ Calcium Gluconate 10 meq/ 

Multivitamins 10 ml/Chromium/ Copper/Manganese/ Seleni/Zn 0.5 ml/ Insulin Human 

Regular 35 unit/ Total Parenteral Nutrition/Amino Acids/Dextrose/ Fat Emulsion 

Intravenous 1,920 ml @  80 mls/hr TPN  CONT IV  Last administered on 4/28/20at 

22:02;  Start 4/28/20 at 22:00;  Stop 4/29/20 at 21:59;  Status DC


Hydromorphone HCl (Dilaudid Standard PCA) 12 mg STK-MED ONCE IV ;  Start 4/27/20

at 14:35;  Stop 4/28/20 at 13:53;  Status DC


Artificial Tears (Artificial Tears) 1 drop PRN Q15MIN  PRN OU DRY EYE Last 

administered on 6/23/20at 21:17;  Start 4/29/20 at 05:30


Hydromorphone HCl (Dilaudid Standard PCA) 12 mg STK-MED ONCE IV ;  Start 4/28/20

at 12:05;  Stop 4/29/20 at 09:15;  Status DC


Potassium Acetate 65 meq/Magnesium Sulfate 20 meq/ Calcium Gluconate 10 meq/ 

Multivitamins 10 ml/Chromium/ Copper/Manganese/ Seleni/Zn 0.5 ml/ Insulin Human 

Regular 30 unit/ Total Parenteral Nutrition/Amino Acids/Dextrose/ Fat Emulsion I

ntravenous 1,920 ml @  80 mls/hr TPN  CONT IV  Last administered on 4/29/20at 

22:22;  Start 4/29/20 at 22:00;  Stop 4/30/20 at 21:59;  Status DC


Cyclobenzaprine HCl (Flexeril) 10 mg PRN Q6HRS  PRN PO MUSCLE SPASMS Last 

administered on 7/10/20at 19:12;  Start 4/30/20 at 10:45


Potassium Acetate 55 meq/Magnesium Sulfate 20 meq/ Calcium Gluconate 10 meq/ 

Multivitamins 10 ml/Chromium/ Copper/Manganese/ Seleni/Zn 0.5 ml/ Insulin Human 

Regular 30 unit/ Total Parenteral Nutrition/Amino Acids/Dextrose/ Fat Emulsion 

Intravenous 1,920 ml @  80 mls/hr TPN  CONT IV  Last administered on 5/1/20at 

01:00;  Start 4/30/20 at 22:00;  Stop 5/1/20 at 21:59;  Status DC


Magnesium Sulfate 50 ml @ 25 mls/hr 1X  ONCE IV  Last administered on 4/30/20at 

17:18;  Start 4/30/20 at 12:45;  Stop 4/30/20 at 14:44;  Status DC


Potassium Chloride/Water 100 ml @  100 mls/hr 1X  ONCE IV  Last administered on 

5/1/20at 11:27;  Start 5/1/20 at 12:00;  Stop 5/1/20 at 12:59;  Status DC


Hydromorphone HCl (Dilaudid Standard PCA) 12 mg STK-MED ONCE IV ;  Start 4/29/20

at 10:50;  Stop 5/1/20 at 11:02;  Status DC


Hydromorphone HCl (Dilaudid Standard PCA) 12 mg STK-MED ONCE IV ;  Start 4/30/20

at 13:47;  Stop 5/1/20 at 11:03;  Status DC


Potassium Acetate 30 meq/Magnesium Sulfate 20 meq/ Calcium Gluconate 10 meq/ 

Multivitamins 10 ml/Chromium/ Copper/Manganese/ Seleni/Zn 0.5 ml/ Insulin Human 

Regular 30 unit/ Potassium Chloride 30 meq/ Total Parenteral Nutrition/Amino 

Acids/Dextrose/ Fat Emulsion Intravenous 1,920 ml @  80 mls/hr TPN  CONT IV  

Last administered on 5/1/20at 22:34;  Start 5/1/20 at 22:00;  Stop 5/2/20 at 

21:59;  Status DC


Potassium Chloride/Water 100 ml @  100 mls/hr Q1H IV  Last administered on 

5/2/20at 13:05;  Start 5/2/20 at 07:00;  Stop 5/2/20 at 10:59;  Status DC


Magnesium Sulfate 50 ml @ 25 mls/hr 1X  ONCE IV  Last administered on 5/2/20at 

10:34;  Start 5/2/20 at 10:30;  Stop 5/2/20 at 12:29;  Status DC


Potassium Chloride 75 meq/ Magnesium Sulfate 20 meq/Calcium Gluconate 10 meq/ 

Multivitamins 10 ml/Chromium/ Copper/Manganese/ Seleni/Zn 0.5 ml/ Insulin Human 

Regular 30 unit/ Total Parenteral Nutrition/Amino Acids/Dextrose/ Fat Emulsion 

Intravenous 1,920 ml @  80 mls/hr TPN  CONT IV  Last administered on 5/2/20at 

21:51;  Start 5/2/20 at 22:00;  Stop 5/3/20 at 22:00;  Status DC


Potassium Chloride 75 meq/ Magnesium Sulfate 20 meq/Calcium Gluconate 10 meq/ 

Multivitamins 10 ml/Chromium/ Copper/Manganese/ Seleni/Zn 0.5 ml/ Insulin Human 

Regular 25 unit/ Total Parenteral Nutrition/Amino Acids/Dextrose/ Fat Emulsion 

Intravenous 1,920 ml @  80 mls/hr TPN  CONT IV  Last administered on 5/3/20at 

22:04;  Start 5/3/20 at 22:00;  Stop 5/4/20 at 21:59;  Status DC


Hydromorphone HCl (Dilaudid) 0.4 mg PRN Q4HRS  PRN IVP PAIN Last administered on

5/4/20at 10:57;  Start 5/4/20 at 09:00;  Stop 5/4/20 at 18:59;  Status DC


Micafungin Sodium 100 mg/Dextrose 100 ml @  100 mls/hr Q24H IV  Last 

administered on 5/26/20at 12:17;  Start 5/4/20 at 11:00;  Stop 5/27/20 at 09:59;

 Status DC


Daptomycin 485 mg/ Sodium Chloride 50 ml @  100 mls/hr Q24H IV  Last 

administered on 5/11/20at 13:10;  Start 5/4/20 at 11:00;  Stop 5/12/20 at 07:44;

 Status DC


Potassium Chloride 75 meq/ Magnesium Sulfate 15 meq/Calcium Gluconate 8 meq/ 

Multivitamins 10 ml/Chromium/ Copper/Manganese/ Seleni/Zn 0.5 ml/ Insulin Human 

Regular 25 unit/ Total Parenteral Nutrition/Amino Acids/Dextrose/ Fat Emulsion 

Intravenous 1,920 ml @  80 mls/hr TPN  CONT IV  Last administered on 5/4/20at 

23:08;  Start 5/4/20 at 22:00;  Stop 5/5/20 at 21:59;  Status DC


Haloperidol Lactate (Haldol Inj) 3 mg 1X  ONCE IVP  Last administered on 

5/4/20at 14:37;  Start 5/4/20 at 14:30;  Stop 5/4/20 at 14:31;  Status DC


Hydromorphone HCl (Dilaudid) 1 mg PRN Q4HRS  PRN IVP PAIN Last administered on 

5/18/20at 06:25;  Start 5/4/20 at 19:00;  Stop 5/18/20 at 17:10;  Status DC


Potassium Chloride 75 meq/ Magnesium Sulfate 15 meq/Calcium Gluconate 8 meq/ 

Multivitamins 10 ml/Chromium/ Copper/Manganese/ Seleni/Zn 0.5 ml/ Insulin Human 

Regular 20 unit/ Total Parenteral Nutrition/Amino Acids/Dextrose/ Fat Emulsion 

Intravenous 1,920 ml @  80 mls/hr TPN  CONT IV  Last administered on 5/5/20at 

22:10;  Start 5/5/20 at 22:00;  Stop 5/6/20 at 21:59;  Status DC


Lidocaine HCl (Buffered Lidocaine 1%) 3 ml STK-MED ONCE .ROUTE ;  Start 5/6/20 

at 11:31;  Stop 5/6/20 at 11:31;  Status DC


Lidocaine HCl (Buffered Lidocaine 1%) 3 ml STK-MED ONCE .ROUTE ;  Start 5/6/20 

at 12:28;  Stop 5/6/20 at 12:29;  Status DC


Lidocaine HCl (Buffered Lidocaine 1%) 6 ml 1X  ONCE INJ  Last administered on 

5/6/20at 12:53;  Start 5/6/20 at 12:45;  Stop 5/6/20 at 12:46;  Status DC


Potassium Chloride 75 meq/ Magnesium Sulfate 15 meq/Calcium Gluconate 8 meq/ 

Multivitamins 10 ml/Chromium/ Copper/Manganese/ Seleni/Zn 0.5 ml/ Insulin Human 

Regular 20 unit/ Total Parenteral Nutrition/Amino Acids/Dextrose/ Fat Emulsion 

Intravenous 1,920 ml @  80 mls/hr TPN  CONT IV  Last administered on 5/6/20at 

22:00;  Start 5/6/20 at 22:00;  Stop 5/7/20 at 21:59;  Status DC


Potassium Chloride 75 meq/ Magnesium Sulfate 15 meq/Calcium Gluconate 8 meq/ 

Multivitamins 10 ml/Chromium/ Copper/Manganese/ Seleni/Zn 0.5 ml/ Insulin Human 

Regular 15 unit/ Total Parenteral Nutrition/Amino Acids/Dextrose/ Fat Emulsion 

Intravenous 1,920 ml @  80 mls/hr TPN  CONT IV  Last administered on 5/7/20at 

22:28;  Start 5/7/20 at 22:00;  Stop 5/8/20 at 21:59;  Status DC


Vecuronium Bromide (Norcuron Bolus) 6 mg PRN Q6HRS  PRN IV VENT ASYNCHRONY;  

Start 5/7/20 at 19:15;  Stop 5/7/20 at 19:35;  Status DC


Bumetanide (Bumex) 2 mg 1X  ONCE IV  Last administered on 5/7/20at 22:09;  Start

5/7/20 at 19:45;  Stop 5/7/20 at 19:46;  Status DC


Lidocaine HCl (Buffered Lidocaine 1%) 3 ml STK-MED ONCE .ROUTE ;  Start 5/8/20 

at 07:59;  Stop 5/8/20 at 07:59;  Status DC


Midazolam HCl (Versed) 5 mg STK-MED ONCE .ROUTE ;  Start 5/8/20 at 08:36;  Stop 

5/8/20 at 08:36;  Status DC


Fentanyl Citrate (Fentanyl 5ml Vial) 250 mcg STK-MED ONCE .ROUTE ;  Start 5/8/20

at 08:36;  Stop 5/8/20 at 08:37;  Status DC


Lidocaine HCl (Buffered Lidocaine 1%) 3 ml 1X  ONCE IJ  Last administered on 

5/8/20at 09:30;  Start 5/8/20 at 09:15;  Stop 5/8/20 at 09:16;  Status DC


Midazolam HCl (Versed) 5 mg 1X  ONCE IV  Last administered on 5/8/20at 09:30;  

Start 5/8/20 at 09:15;  Stop 5/8/20 at 09:16;  Status DC


Fentanyl Citrate (Fentanyl 5ml Vial) 250 mcg 1X  ONCE IV  Last administered on 

5/8/20at 09:30;  Start 5/8/20 at 09:15;  Stop 5/8/20 at 09:16;  Status DC


Bumetanide (Bumex) 2 mg DAILY IV  Last administered on 5/18/20at 08:07;  Start 

5/8/20 at 10:00;  Stop 5/18/20 at 17:15;  Status DC


Potassium Chloride 75 meq/ Magnesium Sulfate 15 meq/ Multivitamins 10 

ml/Chromium/ Copper/Manganese/ Seleni/Zn 0.5 ml/ Insulin Human Regular 15 unit/ 

Total Parenteral Nutrition/Amino Acids/Dextrose/ Fat Emulsion Intravenous 1,920 

ml @  80 mls/hr TPN  CONT IV  Last administered on 5/8/20at 21:59;  Start 5/8/20

at 22:00;  Stop 5/9/20 at 21:59;  Status DC


Metoclopramide HCl (Reglan Vial) 10 mg PRN Q3HRS  PRN IVP NAUSEA/VOMITING-3rd 

choice Last administered on 5/14/20at 04:25;  Start 5/9/20 at 16:45


Potassium Chloride 75 meq/ Magnesium Sulfate 15 meq/ Multivitamins 10 

ml/Chromium/ Copper/Manganese/ Seleni/Zn 0.5 ml/ Insulin Human Regular 15 unit/ 

Total Parenteral Nutrition/Amino Acids/Dextrose/ Fat Emulsion Intravenous 1,920 

ml @  80 mls/hr TPN  CONT IV  Last administered on 5/9/20at 22:41;  Start 5/9/20

at 22:00;  Stop 5/10/20 at 21:59;  Status DC


Magnesium Sulfate 50 ml @ 25 mls/hr 1X  ONCE IV  Last administered on 5/10/20at 

10:44;  Start 5/10/20 at 09:00;  Stop 5/10/20 at 10:59;  Status DC


Potassium Chloride/Water 100 ml @  100 mls/hr 1X  ONCE IV  Last administered on 

5/10/20at 09:37;  Start 5/10/20 at 09:00;  Stop 5/10/20 at 09:59;  Status DC


Duloxetine HCl (Cymbalta) 30 mg DAILY PO  Last administered on 5/11/20at 09:48; 

Start 5/10/20 at 14:00;  Stop 5/13/20 at 10:25;  Status DC


Potassium Chloride 80 meq/ Magnesium Sulfate 20 meq/ Multivitamins 10 

ml/Chromium/ Copper/Manganese/ Seleni/Zn 0.5 ml/ Insulin Human Regular 15 unit/ 

Total Parenteral Nutrition/Amino Acids/Dextrose/ Fat Emulsion Intravenous 1,920 

ml @  80 mls/hr TPN  CONT IV  Last administered on 5/10/20at 21:42;  Start 

5/10/20 at 22:00;  Stop 5/11/20 at 21:59;  Status DC


Potassium Chloride 80 meq/ Magnesium Sulfate 20 meq/ Multivitamins 10 

ml/Chromium/ Copper/Manganese/ Seleni/Zn 0.5 ml/ Insulin Human Regular 15 unit/ 

Total Parenteral Nutrition/Amino Acids/Dextrose/ Fat Emulsion Intravenous 1,920 

ml @  80 mls/hr TPN  CONT IV  Last administered on 5/11/20at 22:20;  Start 

5/11/20 at 22:00;  Stop 5/12/20 at 21:59;  Status DC


Lidocaine HCl (Buffered Lidocaine 1%) 3 ml STK-MED ONCE .ROUTE ;  Start 5/12/20 

at 09:54;  Stop 5/12/20 at 09:55;  Status DC


Hydromorphone HCl (Dilaudid Standard PCA) 12 mg STK-MED ONCE IV ;  Start 5/1/20 

at 15:50;  Stop 5/12/20 at 11:24;  Status DC


Potassium Chloride 80 meq/ Magnesium Sulfate 20 meq/ Multivitamins 10 

ml/Chromium/ Copper/Manganese/ Seleni/Zn 0.5 ml/ Insulin Human Regular 15 unit/ 

Total Parenteral Nutrition/Amino Acids/Dextrose/ Fat Emulsion Intravenous 1,920 

ml @  80 mls/hr TPN  CONT IV  Last administered on 5/12/20at 21:40;  Start 

5/12/20 at 22:00;  Stop 5/13/20 at 21:59;  Status DC


Lidocaine HCl (Buffered Lidocaine 1%) 6 ml 1X  ONCE INJ  Last administered on 

5/12/20at 14:15;  Start 5/12/20 at 14:15;  Stop 5/12/20 at 14:16;  Status DC


Potassium Chloride 80 meq/ Magnesium Sulfate 20 meq/ Multivitamins 10 

ml/Chromium/ Copper/Manganese/ Seleni/Zn 1 ml/ Insulin Human Regular 15 unit/ 

Total Parenteral Nutrition/Amino Acids/Dextrose/ Fat Emulsion Intravenous 1,920 

ml @  80 mls/hr TPN  CONT IV  Last administered on 5/13/20at 22:04;  Start 

5/13/20 at 22:00;  Stop 5/14/20 at 21:59;  Status DC


Potassium Chloride/Water 100 ml @  100 mls/hr 1X  ONCE IV  Last administered on 

5/14/20at 11:34;  Start 5/14/20 at 11:00;  Stop 5/14/20 at 11:59;  Status DC


Potassium Chloride 90 meq/ Magnesium Sulfate 20 meq/ Multivitamins 10 

ml/Chromium/ Copper/Manganese/ Seleni/Zn 1 ml/ Insulin Human Regular 15 unit/ 

Total Parenteral Nutrition/Amino Acids/Dextrose/ Fat Emulsion Intravenous 1,920 

ml @  80 mls/hr TPN  CONT IV  Last administered on 5/14/20at 22:57;  Start 

5/14/20 at 22:00;  Stop 5/15/20 at 21:59;  Status DC


Potassium Chloride 90 meq/ Magnesium Sulfate 20 meq/ Multivitamins 10 

ml/Chromium/ Copper/Manganese/ Seleni/Zn 1 ml/ Insulin Human Regular 15 unit/ 

Total Parenteral Nutrition/Amino Acids/Dextrose/ Fat Emulsion Intravenous 1,920 

ml @  80 mls/hr TPN  CONT IV  Last administered on 5/15/20at 22:48;  Start 

5/15/20 at 22:00;  Stop 5/16/20 at 21:59;  Status DC


Potassium Chloride 90 meq/ Magnesium Sulfate 20 meq/ Multivitamins 10 

ml/Chromium/ Copper/Manganese/ Seleni/Zn 1 ml/ Insulin Human Regular 15 unit/ 

Total Parenteral Nutrition/Amino Acids/Dextrose/ Fat Emulsion Intravenous 1,890 

ml @  78.75 mls/ hr TPN  CONT IV  Last administered on 5/16/20at 22:15;  Start 

5/16/20 at 22:00;  Stop 5/17/20 at 21:59;  Status DC


Linezolid/Dextrose 300 ml @  300 mls/hr Q12HR IV  Last administered on 5/19/20at

21:08;  Start 5/17/20 at 09:00;  Stop 5/20/20 at 08:11;  Status DC


Daptomycin 450 mg/ Sodium Chloride 50 ml @  100 mls/hr Q24H IV  Last ad

ministered on 5/20/20at 09:25;  Start 5/17/20 at 09:00;  Stop 5/21/20 at 08:30; 

Status DC


Potassium Chloride 90 meq/ Magnesium Sulfate 20 meq/ Multivitamins 10 

ml/Chromium/ Copper/Manganese/ Seleni/Zn 1 ml/ Insulin Human Regular 15 unit/ 

Total Parenteral Nutrition/Amino Acids/Dextrose/ Fat Emulsion Intravenous 1,890 

ml @  78.75 mls/ hr TPN  CONT IV  Last administered on 5/17/20at 21:34;  Start 

5/17/20 at 22:00;  Stop 5/18/20 at 21:59;  Status DC


Lorazepam (Ativan Inj) 2 mg STK-MED ONCE .ROUTE ;  Start 5/17/20 at 14:58;  Stop

5/17/20 at 14:58;  Status DC


Metoprolol Tartrate (Lopressor Vial) 5 mg 1X  ONCE IVP  Last administered on 5/ 17/20at 15:31;  Start 5/17/20 at 15:15;  Stop 5/17/20 at 15:16;  Status DC


Lorazepam (Ativan Inj) 2 mg 1X  ONCE IVP  Last administered on 5/17/20at 15:30; 

Start 5/17/20 at 15:15;  Stop 5/17/20 at 15:16;  Status DC


Enoxaparin Sodium (Lovenox 40mg Syringe) 40 mg Q24H SQ  Last administered on 

6/5/20at 17:44;  Start 5/17/20 at 17:00;  Stop 6/7/20 at 06:50;  Status DC


Lorazepam (Ativan Inj) 1 mg PRN Q4HRS  PRN IVP ANXIETY / AGITATION MILD-MOD Last

administered on 5/31/20at 15:55;  Start 5/17/20 at 19:15;  Stop 6/2/20 at 11:45;

 Status DC


Lorazepam (Ativan Inj) 2 mg PRN Q4HRS  PRN IVP ANXIETY / AGITATION SEVERE Last 

administered on 6/1/20at 07:55;  Start 5/17/20 at 19:15;  Stop 6/2/20 at 11:45; 

Status DC


Fentanyl Citrate (Fentanyl 2ml Vial) 50 mcg PRN Q4HRS  PRN IVP SEVERE PAIN Last 

administered on 6/13/20at 05:15;  Start 5/18/20 at 13:15;  Stop 6/14/20 at 

09:29;  Status DC


Fentanyl Citrate (Fentanyl 2ml Vial) 25 mcg PRN Q4HRS  PRN IVP MODERATE PAIN 

Last administered on 6/13/20at 00:27;  Start 5/18/20 at 13:15;  Stop 6/14/20 at 

09:30;  Status DC


Potassium Chloride 90 meq/ Magnesium Sulfate 20 meq/ Multivitamins 10 

ml/Chromium/ Copper/Manganese/ Seleni/Zn 1 ml/ Insulin Human Regular 15 unit/ 

Total Parenteral Nutrition/Amino Acids/Dextrose/ Fat Emulsion Intravenous 1,890 

ml @  78.75 mls/ hr TPN  CONT IV  Last administered on 5/18/20at 22:18;  Start 

5/18/20 at 22:00;  Stop 5/19/20 at 21:59;  Status DC


Furosemide (Lasix) 40 mg 1X  ONCE IVP  Last administered on 5/18/20at 21:51;  

Start 5/18/20 at 21:45;  Stop 5/18/20 at 21:48;  Status DC


Albumin Human 100 ml @  100 mls/hr 1X PRN  PRN IV SEE COMMENTS;  Start 5/19/20 

at 01:30


Furosemide (Lasix) 40 mg [id number] IVP  Last administered on 6/3/20at 08:04;  Start 

5/19/20 at 14:00;  Stop 6/3/20 at 13:07;  Status DC


Potassium Chloride 90 meq/ Magnesium Sulfate 20 meq/ Multivitamins 10 

ml/Chromium/ Copper/Manganese/ Seleni/Zn 1 ml/ Insulin Human Regular 15 unit/ 

Total Parenteral Nutrition/Amino Acids/Dextrose/ Fat Emulsion Intravenous 1,800 

ml @  75 mls/hr TPN  CONT IV  Last administered on 5/19/20at 22:31;  Start 

5/19/20 at 22:00;  Stop 5/20/20 at 21:59;  Status DC


Potassium Chloride 90 meq/ Magnesium Sulfate 20 meq/ Multivitamins 10 

ml/Chromium/ Copper/Manganese/ Seleni/Zn 1 ml/ Insulin Human Regular 15 unit/ 

Total Parenteral Nutrition/Amino Acids/Dextrose/ Fat Emulsion Intravenous 1,800 

ml @  75 mls/hr TPN  CONT IV  Last administered on 5/20/20at 22:28;  Start 

5/20/20 at 22:00;  Stop 5/21/20 at 21:59;  Status DC


Potassium Chloride 110 meq/ Magnesium Sulfate 20 meq/ Multivitamins 10 ml

/Chromium/ Copper/Manganese/ Seleni/Zn 1 ml/ Insulin Human Regular 15 unit/ 

Total Parenteral Nutrition/Amino Acids/Dextrose/ Fat Emulsion Intravenous 1,800 

ml @  75 mls/hr TPN  CONT IV  Last administered on 5/21/20at 22:01;  Start 

5/21/20 at 22:00;  Stop 5/22/20 at 21:59;  Status DC


Saliva Substitute (Biotene Moisturizing Mouth) 2 spray PRN Q15MIN  PRN PO DRY 

MOUTH;  Start 5/21/20 at 11:00


Potassium Chloride 110 meq/ Magnesium Sulfate 20 meq/ Multivitamins 10 

ml/Chromium/ Copper/Manganese/ Seleni/Zn 1 ml/ Insulin Human Regular 15 unit/ 

Total Parenteral Nutrition/Amino Acids/Dextrose/ Fat Emulsion Intravenous 1,800 

ml @  75 mls/hr TPN  CONT IV  Last administered on 5/22/20at 22:21;  Start 

5/22/20 at 22:00;  Stop 5/23/20 at 21:59;  Status DC


Potassium Chloride 110 meq/ Magnesium Sulfate 20 meq/ Multivitamins 10 

ml/Chromium/ Copper/Manganese/ Seleni/Zn 1 ml/ Insulin Human Regular 15 unit/ 

Total Parenteral Nutrition/Amino Acids/Dextrose/ Fat Emulsion Intravenous 1,800 

ml @  75 mls/hr TPN  CONT IV  Last administered on 5/23/20at 22:04;  Start 

5/23/20 at 22:00;  Stop 5/24/20 at 21:59;  Status DC


Potassium Chloride 110 meq/ Magnesium Sulfate 20 meq/ Multivitamins 10 

ml/Chromium/ Copper/Manganese/ Seleni/Zn 1 ml/ Insulin Human Regular 15 unit/ 

Total Parenteral Nutrition/Amino Acids/Dextrose/ Fat Emulsion Intravenous 1,800 

ml @  75 mls/hr TPN  CONT IV  Last administered on 5/24/20at 22:48;  Start 

5/24/20 at 22:00;  Stop 5/25/20 at 21:59;  Status DC


Potassium Chloride 70 meq/ Magnesium Sulfate 20 meq/ Multivitamins 10 ml

/Chromium/ Copper/Manganese/ Seleni/Zn 1 ml/ Insulin Human Regular 15 unit/ 

Total Parenteral Nutrition/Amino Acids/Dextrose/ Fat Emulsion Intravenous 1,800 

ml @  75 mls/hr TPN  CONT IV  Last administered on 5/25/20at 21:39;  Start 

5/25/20 at 22:00;  Stop 5/26/20 at 21:59;  Status DC


Meropenem 500 mg/ Sodium Chloride 50 ml @  100 mls/hr Q6HRS IV  Last 

administered on 5/27/20at 06:02;  Start 5/25/20 at 18:00;  Stop 5/27/20 at 

09:59;  Status DC


Barium Sulfate (Varibar Thin Liquid Apple) 148 gm 1X  ONCE PO ;  Start 5/26/20 

at 11:45;  Stop 5/26/20 at 11:49;  Status DC


Potassium Chloride 70 meq/ Magnesium Sulfate 20 meq/ Multivitamins 10 

ml/Chromium/ Copper/Manganese/ Seleni/Zn 1 ml/ Insulin Human Regular 15 unit/ 

Total Parenteral Nutrition/Amino Acids/Dextrose/ Fat Emulsion Intravenous 1,800 

ml @  75 mls/hr TPN  CONT IV  Last administered on 5/26/20at 22:27;  Start 

5/26/20 at 22:00;  Stop 5/27/20 at 21:59;  Status DC


Piperacillin Sod/ Tazobactam Sod 3.375 gm/Sodium Chloride 50 ml @  100 mls/hr 

Q6HRS IV  Last administered on 6/4/20at 06:10;  Start 5/27/20 at 12:00;  Stop 

6/4/20 at 07:26;  Status DC


Potassium Chloride 70 meq/ Magnesium Sulfate 20 meq/ Multivitamins 10 

ml/Chromium/ Copper/Manganese/ Seleni/Zn 1 ml/ Insulin Human Regular 15 unit/ 

Total Parenteral Nutrition/Amino Acids/Dextrose/ Fat Emulsion Intravenous 1,800 

ml @  75 mls/hr TPN  CONT IV  Last administered on 5/27/20at 22:03;  Start 

5/27/20 at 22:00;  Stop 5/28/20 at 21:59;  Status DC


Potassium Chloride 70 meq/ Magnesium Sulfate 20 meq/ Multivitamins 10 

ml/Chromium/ Copper/Manganese/ Seleni/Zn 1 ml/ Insulin Human Regular 15 unit/ 

Total Parenteral Nutrition/Amino Acids/Dextrose/ Fat Emulsion Intravenous 1,800 

ml @  75 mls/hr TPN  CONT IV  Last administered on 5/28/20at 22:33;  Start 

5/28/20 at 22:00;  Stop 5/29/20 at 21:59;  Status DC


Potassium Chloride 70 meq/ Magnesium Sulfate 20 meq/ Multivitamins 10 

ml/Chromium/ Copper/Manganese/ Seleni/Zn 1 ml/ Insulin Human Regular 15 unit/ 

Total Parenteral Nutrition/Amino Acids/Dextrose/ Fat Emulsion Intravenous 1,800 

ml @  75 mls/hr TPN  CONT IV  Last administered on 5/29/20at 23:13;  Start 

5/29/20 at 22:00;  Stop 5/30/20 at 21:59;  Status DC


Potassium Chloride 80 meq/ Magnesium Sulfate 20 meq/ Multivitamins 10 

ml/Chromium/ Copper/Manganese/ Seleni/Zn 1 ml/ Insulin Human Regular 15 unit/ 

Total Parenteral Nutrition/Amino Acids/Dextrose/ Fat Emulsion Intravenous 1,800 

ml @  75 mls/hr TPN  CONT IV  Last administered on 5/30/20at 22:30;  Start 

5/30/20 at 22:00;  Stop 5/31/20 at 21:59;  Status DC


Potassium Chloride 80 meq/ Magnesium Sulfate 20 meq/ Multivitamins 10 

ml/Chromium/ Copper/Manganese/ Seleni/Zn 1 ml/ Insulin Human Regular 15 unit/ 

Total Parenteral Nutrition/Amino Acids/Dextrose/ Fat Emulsion Intravenous 1,800 

ml @  75 mls/hr TPN  CONT IV  Last administered on 5/31/20at 21:54;  Start 

5/31/20 at 22:00;  Stop 6/1/20 at 21:59;  Status DC


Potassium Chloride/Water 100 ml @  100 mls/hr 1X  ONCE IV  Last administered on 

6/1/20at 10:15;  Start 6/1/20 at 10:00;  Stop 6/1/20 at 10:59;  Status DC


Potassium Chloride 90 meq/ Magnesium Sulfate 20 meq/ Multivitamins 10 

ml/Chromium/ Copper/Manganese/ Seleni/Zn 1 ml/ Insulin Human Regular 20 unit/ 

Total Parenteral Nutrition/Amino Acids/Dextrose/ Fat Emulsion Intravenous 1,800 

ml @  75 mls/hr TPN  CONT IV  Last administered on 6/1/20at 22:28;  Start 6/1/20

at 22:00;  Stop 6/2/20 at 21:59;  Status DC


Potassium Chloride 90 meq/ Magnesium Sulfate 20 meq/ Multivitamins 10 

ml/Chromium/ Copper/Manganese/ Seleni/Zn 1 ml/ Insulin Human Regular 20 unit/ 

Total Parenteral Nutrition/Amino Acids/Dextrose/ Fat Emulsion Intravenous 1,800 

ml @  75 mls/hr TPN  CONT IV  Last administered on 6/2/20at 22:08;  Start 6/2/20

at 22:00;  Stop 6/3/20 at 21:59;  Status DC


Lorazepam (Ativan Inj) 0.25 mg PRN Q4HRS  PRN IVP ANXIETY / AGITATION Last 

administered on 7/20/20at 03:28;  Start 6/3/20 at 07:30


Potassium Chloride 90 meq/ Magnesium Sulfate 20 meq/ Multivitamins 10 

ml/Chromium/ Copper/Manganese/ Seleni/Zn 1 ml/ Insulin Human Regular 20 unit/ 

Total Parenteral Nutrition/Amino Acids/Dextrose/ Fat Emulsion Intravenous 1,800 

ml @  75 mls/hr TPN  CONT IV  Last administered on 6/3/20at 23:13;  Start 6/3/20

at 22:00;  Stop 6/4/20 at 21:59;  Status DC


Furosemide (Lasix) 40 mg DAILY IVP  Last administered on 6/5/20at 11:14;  Start 

6/3/20 at 13:30;  Stop 6/7/20 at 09:12;  Status DC


Fluoxetine HCl (PROzac) 20 mg QHS PEG  Last administered on 7/23/20at 20:48;  

Start 6/4/20 at 21:00


Fentanyl (Duragesic 50mcg/ Hr Patch) 1 patch Q72H TD  Last administered on 

6/4/20at 21:22;  Start 6/4/20 at 21:00;  Stop 6/13/20 at 12:00;  Status DC


Potassium Chloride 40 meq/ Potassium Acetate 60 meq/Magnesium Sulfate 10 meq/ 

Multivitamins 10 ml/Chromium/ Copper/Manganese/ Seleni/Zn 1 ml/ Insulin Human 

Regular 20 unit/ Total Parenteral Nutrition/Amino Acids/Dextrose/ Fat Emulsion 

Intravenous 1,800 ml @  75 mls/hr TPN  CONT IV  Last administered on 6/5/20at 

00:03;  Start 6/4/20 at 22:00;  Stop 6/5/20 at 21:59;  Status DC


Potassium Acetate 80 meq/Magnesium Sulfate 5 meq/ Multivitamins 10 ml/Chromium/ 

Copper/Manganese/ Seleni/Zn 1 ml/ Insulin Human Regular 20 unit/ Total 

Parenteral Nutrition/Amino Acids/Dextrose/ Fat Emulsion Intravenous 1,920 ml @  

80 mls/hr TPN  CONT IV  Last administered on 6/5/20at 21:59;  Start 6/5/20 at 

22:00;  Stop 6/6/20 at 21:59;  Status DC


Potassium Acetate 60 meq/Magnesium Sulfate 5 meq/ Multivitamins 10 ml/Chromium/ 

Copper/Manganese/ Seleni/Zn 1 ml/ Insulin Human Regular 30 unit/ Total 

Parenteral Nutrition/Amino Acids/Dextrose/ Fat Emulsion Intravenous 1,920 ml @  

80 mls/hr TPN  CONT IV  Last administered on 6/6/20at 21:54;  Start 6/6/20 at 

22:00;  Stop 6/7/20 at 21:59;  Status DC


Norepinephrine Bitartrate 8 mg/ Dextrose 258 ml @  13.332 mls/ hr CONT  PRN IV 

PER PROTOCOL Last administered on 7/2/20at 09:09;  Start 6/7/20 at 06:30


Albumin Human 500 ml @  125 mls/hr 1X  ONCE IV  Last administered on 6/7/20at 

08:10;  Start 6/7/20 at 08:15;  Stop 6/7/20 at 12:14;  Status DC


Potassium Acetate 40 meq/Magnesium Sulfate 5 meq/ Multivitamins 10 ml/Chromium/ 

Copper/Manganese/ Seleni/Zn 1 ml/ Insulin Human Regular 30 unit/ Total Parent

eral Nutrition/Amino Acids/Dextrose/ Fat Emulsion Intravenous 1,920 ml @  80 

mls/hr TPN  CONT IV  Last administered on 6/7/20at 22:23;  Start 6/7/20 at 

22:00;  Stop 6/8/20 at 21:59;  Status DC


Meropenem 1 gm/ Sodium Chloride 100 ml @  200 mls/hr Q8HRS IV ;  Start 6/7/20 at

14:00;  Status Cancel


Meropenem 1 gm/ Sodium Chloride 100 ml @  200 mls/hr Q8HRS IV  Last administered

on 6/7/20at 11:04;  Start 6/7/20 at 10:00;  Stop 6/7/20 at 13:00;  Status DC


Meropenem 1 gm/ Sodium Chloride 100 ml @  200 mls/hr Q12HR IV  Last administered

on 6/25/20at 08:27;  Start 6/7/20 at 21:00;  Stop 6/25/20 at 08:56;  Status DC


Sodium Chloride 1,000 ml @  1,000 mls/hr 1X  ONCE IV  Last administered on 

6/7/20at 11:06;  Start 6/7/20 at 10:45;  Stop 6/7/20 at 11:44;  Status DC


Micafungin Sodium 100 mg/Dextrose 100 ml @  100 mls/hr Q24H IV  Last 

administered on 6/24/20at 12:34;  Start 6/7/20 at 11:00;  Stop 6/25/20 at 08:56;

 Status DC


Daptomycin 410 mg/ Sodium Chloride 50 ml @  100 mls/hr Q24H IV  Last 

administered on 6/9/20at 13:33;  Start 6/7/20 at 14:00;  Stop 6/10/20 at 08:30; 

Status DC


Midazolam HCl (Versed) 2 mg STK-MED ONCE .ROUTE ;  Start 6/7/20 at 14:47;  Stop 

6/7/20 at 14:48;  Status DC


Fentanyl Citrate (Fentanyl 2ml Vial) 100 mcg STK-MED ONCE .ROUTE ;  Start 6/7/20

at 14:47;  Stop 6/7/20 at 14:48;  Status DC


Flumazenil (Romazicon) 0.5 mg STK-MED ONCE IV ;  Start 6/7/20 at 14:48;  Stop 

6/7/20 at 14:48;  Status DC


Naloxone HCl (Narcan) 0.4 mg STK-MED ONCE .ROUTE ;  Start 6/7/20 at 14:48;  Stop

6/7/20 at 14:48;  Status DC


Lidocaine HCl (Lidocaine 1% 20ml Vial) 20 ml STK-MED ONCE .ROUTE ;  Start 6/7/20

at 14:48;  Stop 6/7/20 at 14:48;  Status DC


Midazolam HCl (Versed) 2 mg 1X  ONCE IV  Last administered on 6/7/20at 15:28;  

Start 6/7/20 at 15:00;  Stop 6/7/20 at 15:01;  Status DC


Fentanyl Citrate (Fentanyl 2ml Vial) 100 mcg 1X  ONCE IV  Last administered on 

6/7/20at 15:28;  Start 6/7/20 at 15:00;  Stop 6/7/20 at 15:01;  Status DC


Lidocaine HCl (Lidocaine 1% 20ml Vial) 20 ml 1X  ONCE INJ  Last administered on 

6/7/20at 15:30;  Start 6/7/20 at 15:00;  Stop 6/7/20 at 15:01;  Status DC


Sodium Chloride 1,000 ml @  100 mls/hr Q10H IV  Last administered on 6/16/20at 

07:30;  Start 6/7/20 at 20:00;  Stop 6/16/20 at 11:26;  Status DC


Sodium Bicarbonate (Sodium Bicarb Adult 8.4% Syr) 50 meq 1X  ONCE IV  Last 

administered on 6/7/20at 21:47;  Start 6/7/20 at 22:00;  Stop 6/7/20 at 22:01;  

Status DC


Potassium Acetate 40 meq/Magnesium Sulfate 5 meq/ Multivitamins 10 ml/Chromium/ 

Copper/Manganese/ Seleni/Zn 1 ml/ Insulin Human Regular 30 unit/ Total 

Parenteral Nutrition/Amino Acids/Dextrose/ Fat Emulsion Intravenous 1,920 ml @  

80 mls/hr TPN  CONT IV  Last administered on 6/8/20at 22:28;  Start 6/8/20 at 

22:00;  Stop 6/9/20 at 21:59;  Status DC


Sodium Chloride 500 ml @  500 mls/hr 1X  ONCE IV  Last administered on 6/9/20at 

06:39;  Start 6/9/20 at 06:45;  Stop 6/9/20 at 07:44;  Status DC


Potassium Acetate 40 meq/Magnesium Sulfate 5 meq/ Multivitamins 10 ml/Chromium/ 

Copper/Manganese/ Seleni/Zn 1 ml/ Insulin Human Regular 30 unit/ Total 

Parenteral Nutrition/Amino Acids/Dextrose/ Fat Emulsion Intravenous 1,920 ml @  

80 mls/hr TPN  CONT IV  Last administered on 6/9/20at 22:03;  Start 6/9/20 at 

22:00;  Stop 6/10/20 at 21:59;  Status DC


Metoprolol Tartrate (Lopressor Vial) 5 mg PRN Q6HRS  PRN IVP HYPERTENSION Last 

administered on 7/23/20at 21:19;  Start 6/10/20 at 09:00


Potassium Acetate 40 meq/Magnesium Sulfate 5 meq/ Multivitamins 10 ml/Chromium/ 

Copper/Manganese/ Seleni/Zn 1 ml/ Insulin Human Regular 30 unit/ Total 

Parenteral Nutrition/Amino Acids/Dextrose/ Fat Emulsion Intravenous 1,920 ml @  

80 mls/hr TPN  CONT IV  Last administered on 6/10/20at 21:26;  Start 6/10/20 at 

22:00;  Stop 6/11/20 at 21:59;  Status DC


Potassium Acetate 40 meq/Magnesium Sulfate 5 meq/ Multivitamins 10 ml/Chromium/ 

Copper/Manganese/ Seleni/Zn 1 ml/ Insulin Human Regular 30 unit/ Total 

Parenteral Nutrition/Amino Acids/Dextrose/ Fat Emulsion Intravenous 1,920 ml @  

80 mls/hr TPN  CONT IV  Last administered on 6/11/20at 23:23;  Start 6/11/20 at 

22:00;  Stop 6/12/20 at 21:59;  Status DC


Potassium Acetate 40 meq/Magnesium Sulfate 5 meq/ Multivitamins 10 ml/Chromium/ 

Copper/Manganese/ Seleni/Zn 1 ml/ Insulin Human Regular 30 unit/ Total 

Parenteral Nutrition/Amino Acids/Dextrose/ Fat Emulsion Intravenous 1,920 ml @  

80 mls/hr TPN  CONT IV  Last administered on 6/12/20at 21:35;  Start 6/12/20 at 

22:00;  Stop 6/13/20 at 21:59;  Status DC


Furosemide (Lasix) 20 mg 1X  ONCE IVP  Last administered on 6/13/20at 06:26;  

Start 6/13/20 at 06:15;  Stop 6/13/20 at 06:16;  Status DC


Methylprednisolone Sodium Succinate (SOLU-Medrol 125MG VIAL) 125 mg 1X  ONCE IV 

Last administered on 6/13/20at 06:26;  Start 6/13/20 at 06:15;  Stop 6/13/20 at 

06:16;  Status DC


Albuterol/ Ipratropium (Duoneb) 3 ml Q4HRS NEB  Last administered on 7/24/20at 

07:58;  Start 6/13/20 at 08:00


Fentanyl Citrate 30 ml @ 0 mls/hr CONT  PRN IV SEE PROTOCOL Last administered on

7/4/20at 08:03;  Start 6/13/20 at 06:00;  Stop 7/4/20 at 12:42;  Status DC


Propofol 100 ml @ 0 mls/hr CONT  PRN IV SEE PROTOCOL Last administered on 

6/20/20at 23:50;  Start 6/13/20 at 06:00


Fentanyl Citrate (Fentanyl 2ml Vial) 25 mcg PRN Q1HR  PRN IV SEE COMMENTS Last 

administered on 7/23/20at 22:32;  Start 6/13/20 at 06:00


Fentanyl Citrate (Fentanyl 2ml Vial) 50 mcg PRN Q1HR  PRN IV SEE COMMENTS Last 

administered on 7/22/20at 13:03;  Start 6/13/20 at 06:00


Chlorhexidine Gluconate (Peridex) 15 ml BID MM ;  Start 6/13/20 at 09:00;  Stop 

6/13/20 at 07:58;  Status DC


Potassium Acetate 40 meq/Magnesium Sulfate 5 meq/ Multivitamins 10 ml/Chromium/ 

Copper/Manganese/ Seleni/Zn 1 ml/ Insulin Human Regular 30 unit/ Total 

Parenteral Nutrition/Amino Acids/Dextrose/ Fat Emulsion Intravenous 1,920 ml @  

80 mls/hr TPN  CONT IV  Last administered on 6/13/20at 21:19;  Start 6/13/20 at 

22:00;  Stop 6/14/20 at 21:59;  Status DC


Acetylcysteine (Mucomyst 20% Resp Treatment) 600 mg BID NEB  Last administered 

on 6/19/20at 09:33;  Start 6/13/20 at 21:00;  Stop 6/19/20 at 10:39;  Status DC


Magnesium Sulfate 100 ml @  25 mls/hr 1X  ONCE IV  Last administered on 

6/13/20at 15:48;  Start 6/13/20 at 15:45;  Stop 6/13/20 at 19:44;  Status DC


Potassium Acetate 40 meq/Magnesium Sulfate 5 meq/ Multivitamins 10 ml/Chromium/ 

Copper/Manganese/ Seleni/Zn 1 ml/ Insulin Human Regular 30 unit/ Total 

Parenteral Nutrition/Amino Acids/Dextrose/ Fat Emulsion Intravenous 1,920 ml @  

80 mls/hr TPN  CONT IV  Last administered on 6/14/20at 21:35;  Start 6/14/20 at 

22:00;  Stop 6/15/20 at 21:59;  Status DC


Potassium Chloride/Water 100 ml @  100 mls/hr Q1H IV  Last administered on 

6/15/20at 08:31;  Start 6/15/20 at 07:00;  Stop 6/15/20 at 08:59;  Status DC


Potassium Acetate 40 meq/Magnesium Sulfate 5 meq/ Multivitamins 10 ml/Chromium/ 

Copper/Manganese/ Seleni/Zn 1 ml/ Insulin Human Regular 30 unit/ Total 

Parenteral Nutrition/Amino Acids/Dextrose/ Fat Emulsion Intravenous 1,920 ml @  

80 mls/hr TPN  CONT IV  Last administered on 6/15/20at 21:54;  Start 6/15/20 at 

22:00;  Stop 6/16/20 at 19:34;  Status DC


Lidocaine HCl (Buffered Lidocaine 1%) 3 ml STK-MED ONCE .ROUTE ;  Start 6/15/20 

at 12:14;  Stop 6/15/20 at 12:14;  Status DC


Lidocaine HCl (Buffered Lidocaine 1%) 3 ml 1X  ONCE IJ  Last administered on 

6/15/20at 13:11;  Start 6/15/20 at 13:00;  Stop 6/15/20 at 13:01;  Status DC


Magnesium Sulfate 50 ml @ 25 mls/hr 1X  ONCE IV ;  Start 6/16/20 at 08:15;  Stop

6/16/20 at 10:14;  Status DC


Potassium Acetate 40 meq/Magnesium Sulfate 10 meq/ Multivitamins 10 ml/Chromium/

Copper/Manganese/ Seleni/Zn 1 ml/ Insulin Human Regular 20 unit/ Total 

Parenteral Nutrition/Amino Acids/Dextrose/ Fat Emulsion Intravenous 1,920 ml @  

80 mls/hr TPN  CONT IV  Last administered on 6/16/20at 21:32;  Start 6/16/20 at 

22:00;  Stop 6/17/20 at 21:59;  Status DC


Potassium Chloride/Water 100 ml @  100 mls/hr Q1H IV  Last administered on 

6/17/20at 09:12;  Start 6/17/20 at 08:00;  Stop 6/17/20 at 09:59;  Status DC


Alteplase, Recombinant (Cathflo For Central Catheter Clearance) 4 mg 1X  ONCE 

INT CAT ;  Start 6/17/20 at 09:15;  Stop 6/17/20 at 09:16;  Status UNV


Alteplase, Recombinant (Cathflo For Central Catheter Clearance) 4 mg 1X  ONCE 

INT CAT ;  Start 6/17/20 at 09:15;  Stop 6/17/20 at 09:16;  Status UNV


Alteplase, Recombinant (Cathflo For Central Catheter Clearance) 4 mg 1X  ONCE I

NT CAT ;  Start 6/17/20 at 09:15;  Stop 6/17/20 at 09:16;  Status UNV


Alteplase, Recombinant 4 mg/ Sodium Chloride 20 ml @ 20 mls/hr 1X  ONCE IV  Last

administered on 6/17/20at 10:10;  Start 6/17/20 at 10:00;  Stop 6/17/20 at 

10:59;  Status DC


Alteplase, Recombinant 4 mg/ Sodium Chloride 20 ml @ 20 mls/hr 1X  ONCE IV  Last

administered on 6/17/20at 10:09;  Start 6/17/20 at 10:00;  Stop 6/17/20 at 

10:59;  Status DC


Alteplase, Recombinant 4 mg/ Sodium Chloride 20 ml @ 20 mls/hr 1X  ONCE IV  Last

administered on 6/17/20at 10:09;  Start 6/17/20 at 10:00;  Stop 6/17/20 at 

10:59;  Status DC


Potassium Acetate 60 meq/Magnesium Sulfate 10 meq/ Multivitamins 10 ml/Chromium/

Copper/Manganese/ Seleni/Zn 1 ml/ Insulin Human Regular 20 unit/ Total 

Parenteral Nutrition/Amino Acids/Dextrose/ Fat Emulsion Intravenous 1,920 ml @  

80 mls/hr TPN  CONT IV  Last administered on 6/17/20at 21:55;  Start 6/17/20 at 

22:00;  Stop 6/18/20 at 21:59;  Status DC


Albumin Human 500 ml @  125 mls/hr 1X  ONCE IV  Last administered on 6/18/20at 

12:01;  Start 6/18/20 at 11:15;  Stop 6/18/20 at 15:14;  Status DC


Sodium Chloride 500 ml @  500 mls/hr 1X  ONCE IV  Last administered on 6/18/20at

13:50;  Start 6/18/20 at 11:15;  Stop 6/18/20 at 12:14;  Status DC


Potassium Acetate 60 meq/Magnesium Sulfate 14 meq/ Multivitamins 10 ml/Chromium/

Copper/Manganese/ Seleni/Zn 1 ml/ Insulin Human Regular 20 unit/ Total 

Parenteral Nutrition/Amino Acids/Dextrose/ Fat Emulsion Intravenous 1,920 ml @  

80 mls/hr TPN  CONT IV  Last administered on 6/18/20at 22:26;  Start 6/18/20 at 

22:00;  Stop 6/19/20 at 21:59;  Status DC


Ciprofloxacin/ Dextrose 200 ml @  200 mls/hr Q12HR IV  Last administered on 

6/25/20at 08:27;  Start 6/18/20 at 21:00;  Stop 6/25/20 at 08:56;  Status DC


Albumin Human 250 ml @  62.5 mls/hr 1X  ONCE IV  Last administered on 6/19/20at 

11:09;  Start 6/19/20 at 11:00;  Stop 6/19/20 at 14:59;  Status DC


Furosemide (Lasix) 20 mg 1X  ONCE IVP  Last administered on 6/19/20at 14:52;  

Start 6/19/20 at 10:45;  Stop 6/19/20 at 10:49;  Status DC


Potassium Acetate 60 meq/Magnesium Sulfate 14 meq/ Multivitamins 10 ml/Chromium/

Copper/Manganese/ Seleni/Zn 1 ml/ Insulin Human Regular 15 unit/ Total 

Parenteral Nutrition/Amino Acids/Dextrose/ Fat Emulsion Intravenous 1,920 ml @  

80 mls/hr TPN  CONT IV  Last administered on 6/19/20at 22:08;  Start 6/19/20 at 

22:00;  Stop 6/20/20 at 21:59;  Status DC


Potassium Acetate 60 meq/Magnesium Sulfate 14 meq/ Multivitamins 10 ml/Chromium/

Copper/Manganese/ Seleni/Zn 1 ml/ Insulin Human Regular 15 unit/ Total 

Parenteral Nutrition/Amino Acids/Dextrose/ Fat Emulsion Intravenous 1,920 ml @  

80 mls/hr TPN  CONT IV  Last administered on 6/20/20at 22:12;  Start 6/20/20 at 

22:00;  Stop 6/21/20 at 21:59;  Status DC


Potassium Acetate 60 meq/Magnesium Sulfate 14 meq/ Multivitamins 10 ml/Chromium/

Copper/Manganese/ Seleni/Zn 1 ml/ Insulin Human Regular 15 unit/ Total 

Parenteral Nutrition/Amino Acids/Dextrose/ Fat Emulsion Intravenous 1,920 ml @  

80 mls/hr TPN  CONT IV  Last administered on 6/21/20at 22:22;  Start 6/21/20 at 

22:00;  Stop 6/22/20 at 21:59;  Status DC


Furosemide (Lasix) 20 mg 1X  ONCE IVP  Last administered on 6/22/20at 11:07;  

Start 6/22/20 at 10:30;  Stop 6/22/20 at 10:34;  Status DC


Potassium Acetate 60 meq/Magnesium Sulfate 14 meq/ Multivitamins 10 ml/Chromium/

Copper/Manganese/ Seleni/Zn 1 ml/ Insulin Human Regular 15 unit/ Sodium Chloride

20 meq/Total Parenteral Nutrition/Amino Acids/Dextrose/ Fat Emulsion Intravenous

1,920 ml @  80 mls/hr TPN  CONT IV  Last administered on 6/22/20at 21:54;  Start

6/22/20 at 22:00;  Stop 6/23/20 at 21:59;  Status DC


Potassium Acetate 30 meq/Magnesium Sulfate 14 meq/ Multivitamins 10 ml/Chromium/

Copper/Manganese/ Seleni/Zn 1 ml/ Insulin Human Regular 15 unit/ Sodium Chloride

20 meq/Potassium Chloride 30 meq/ Total Parenteral Nutrition/Amino Acid

s/Dextrose/ Fat Emulsion Intravenous 1,920 ml @  80 mls/hr TPN  CONT IV  Last 

administered on 6/23/20at 21:46;  Start 6/23/20 at 22:00;  Stop 6/24/20 at 

21:59;  Status DC


Sodium Chloride 80 meq/Potassium Chloride 30 meq/ Potassium Acetate 30 

meq/Magnesium Sulfate 14 meq/ Multivitamins 10 ml/Chromium/ Copper/Manganese/ 

Seleni/Zn 1 ml/ Insulin Human Regular 15 unit/ Total Parenteral Nutrition/Amino 

Acids/Dextrose/ Fat Emulsion Intravenous 1,920 ml @  80 mls/hr TPN  CONT IV  

Last administered on 6/24/20at 22:33;  Start 6/24/20 at 22:00;  Stop 6/25/20 at 

21:59;  Status DC


Furosemide (Lasix) 40 mg 1X  ONCE IVP  Last administered on 6/24/20at 16:27;  

Start 6/24/20 at 15:30;  Stop 6/24/20 at 15:33;  Status DC


Albumin Human 250 ml @  62.5 mls/hr 1X  ONCE IV  Last administered on 6/24/20at 

16:27;  Start 6/24/20 at 15:30;  Stop 6/24/20 at 19:29;  Status DC


Sodium Chloride 80 meq/Potassium Chloride 30 meq/ Potassium Acetate 30 

meq/Magnesium Sulfate 14 meq/ Multivitamins 10 ml/Chromium/ Copper/Manganese/ 

Seleni/Zn 1 ml/ Insulin Human Regular 15 unit/ Total Parenteral Nutrition/Amino 

Acids/Dextrose/ Fat Emulsion Intravenous 1,920 ml @  80 mls/hr TPN  CONT IV  

Last administered on 6/25/20at 22:25;  Start 6/25/20 at 22:00;  Stop 6/26/20 at 

21:59;  Status DC


Sodium Chloride 80 meq/Potassium Chloride 30 meq/ Potassium Acetate 30 

meq/Magnesium Sulfate 14 meq/ Multivitamins 10 ml/Chromium/ Copper/Manganese/ 

Seleni/Zn 1 ml/ Insulin Human Regular 15 unit/ Total Parenteral Nutrition/Amino 

Acids/Dextrose/ Fat Emulsion Intravenous 1,920 ml @  80 mls/hr TPN  CONT IV  

Last administered on 6/26/20at 21:32;  Start 6/26/20 at 22:00;  Stop 6/27/20 at 

21:59;  Status DC


Sodium Chloride 80 meq/Potassium Chloride 30 meq/ Potassium Acetate 30 

meq/Magnesium Sulfate 14 meq/ Multivitamins 10 ml/Chromium/ Copper/Manganese/ 

Seleni/Zn 1 ml/ Insulin Human Regular 15 unit/ Total Parenteral Nutrition/Amino 

Acids/Dextrose/ Fat Emulsion Intravenous 1,920 ml @  80 mls/hr TPN  CONT IV  

Last administered on 6/27/20at 21:53;  Start 6/27/20 at 22:00;  Stop 6/28/20 at 

21:59;  Status DC


Acetylcysteine (Mucomyst 20% Resp Treatment) 600 mg RTBID NEB  Last administered

on 7/24/20at 07:58;  Start 6/27/20 at 12:00


Sodium Chloride 80 meq/Potassium Chloride 30 meq/ Potassium Acetate 30 

meq/Magnesium Sulfate 14 meq/ Multivitamins 10 ml/Chromium/ Copper/Manganese/ 

Seleni/Zn 1 ml/ Insulin Human Regular 15 unit/ Total Parenteral Nutrition/Amino 

Acids/Dextrose/ Fat Emulsion Intravenous 1,920 ml @  80 mls/hr TPN  CONT IV  

Last administered on 6/28/20at 22:06;  Start 6/28/20 at 22:00;  Stop 6/29/20 at 

21:59;  Status DC


Meropenem 500 mg/ Sodium Chloride 50 ml @  100 mls/hr Q6HRS IV  Last 

administered on 7/21/20at 06:15;  Start 6/28/20 at 18:00;  Stop 7/21/20 at 

08:23;  Status DC


Daptomycin 500 mg/ Sodium Chloride 50 ml @  100 mls/hr Q24H IV  Last 

administered on 7/6/20at 21:47;  Start 6/28/20 at 19:00;  Stop 7/7/20 at 08:13; 

Status DC


Sodium Chloride 80 meq/Potassium Chloride 30 meq/ Potassium Acetate 30 

meq/Magnesium Sulfate 14 meq/ Multivitamins 10 ml/Chromium/ Copper/Manganese/ S

haley/Zn 1 ml/ Insulin Human Regular 15 unit/ Total Parenteral Nutrition/Amino 

Acids/Dextrose/ Fat Emulsion Intravenous 1,920 ml @  80 mls/hr TPN  CONT IV  

Last administered on 6/29/20at 22:09;  Start 6/29/20 at 22:00;  Stop 6/30/20 at 

21:59;  Status DC


Heparin Sodium (Porcine) 1000 unit/Sodium Chloride 1,001 ml @  1,001 mls/hr 1X  

ONCE IRR ;  Start 6/30/20 at 06:00;  Stop 6/30/20 at 06:59;  Status DC


Propofol (Diprivan) 200 mg STK-MED ONCE IV ;  Start 6/30/20 at 07:44;  Stop 

6/30/20 at 07:44;  Status DC


Lidocaine HCl (Lidocaine Pf 2% Vial) 5 ml STK-MED ONCE .ROUTE ;  Start 6/30/20 

at 07:44;  Stop 6/30/20 at 07:44;  Status DC


Fentanyl Citrate (Fentanyl 2ml Vial) 100 mcg STK-MED ONCE .ROUTE ;  Start 

6/30/20 at 07:44;  Stop 6/30/20 at 07:44;  Status DC


Rocuronium Bromide (Zemuron) 100 mg STK-MED ONCE .ROUTE ;  Start 6/30/20 at 

07:44;  Stop 6/30/20 at 07:44;  Status DC


Micafungin Sodium 100 mg/Dextrose 100 ml @  100 mls/hr Q24H IV  Last 

administered on 7/24/20at 08:24;  Start 6/30/20 at 08:30


Bupivacaine HCl/ Epinephrine Bitart (Sensorcain-Epi 0.5%-1:[id number] Mpf) 30 ml 

STK-MED ONCE .ROUTE ;  Start 6/30/20 at 08:34;  Stop 6/30/20 at 08:35;  Status 

DC


Iohexol (Omnipaque 300 Mg/ml) 50 ml STK-MED ONCE .ROUTE  Last administered on 

6/30/20at 13:30;  Start 6/30/20 at 08:35;  Stop 6/30/20 at 08:35;  Status DC


Sodium Chloride 80 meq/Potassium Chloride 30 meq/ Potassium Acetate 30 

meq/Magnesium Sulfate 14 meq/ Multivitamins 10 ml/Chromium/ Copper/Manganese/ 

Seleni/Zn 1 ml/ Insulin Human Regular 15 unit/ Total Parenteral Nutrition/Amino 

Acids/Dextrose/ Fat Emulsion Intravenous 1,920 ml @  80 mls/hr TPN  CONT IV  

Last administered on 7/1/20at 01:22;  Start 6/30/20 at 22:00;  Stop 7/1/20 at 

21:59;  Status DC


Phenylephrine HCl (Ken-Synephrine Inj) 10 mg STK-MED ONCE .ROUTE ;  Start 

6/30/20 at 10:15;  Stop 6/30/20 at 10:15;  Status DC


Desflurane (Suprane) 90 ml STK-MED ONCE IH ;  Start 6/30/20 at 10:18;  Stop 

6/30/20 at 10:19;  Status DC


Albumin Human 500 ml @  As Directed STK-MED ONCE IV ;  Start 6/30/20 at 11:06;  

Stop 6/30/20 at 11:06;  Status DC


Vasopressin (Vasostrict) 20 unit STK-MED ONCE .ROUTE ;  Start 6/30/20 at 12:23; 

Stop 6/30/20 at 12:23;  Status DC


Phenylephrine HCl (Ken-Synephrine Inj) 10 mg STK-MED ONCE .ROUTE ;  Start 

6/30/20 at 13:33;  Stop 6/30/20 at 13:33;  Status DC


Phenylephrine HCl (Ken-Synephrine Inj) 10 mg STK-MED ONCE .ROUTE ;  Start 

6/30/20 at 13:33;  Stop 6/30/20 at 13:33;  Status DC


Ondansetron HCl (Zofran) 4 mg STK-MED ONCE .ROUTE ;  Start 6/30/20 at 13:33;  

Stop 6/30/20 at 13:33;  Status DC


Enoxaparin Sodium (Lovenox 40mg Syringe) 40 mg Q24H SQ  Last administered on 

7/24/20at 08:25;  Start 7/1/20 at 08:00


Sodium Chloride (Normal Saline Flush) 3 ml QSHIFT  PRN IV AFTER MEDS AND BLOOD 

DRAWS;  Start 6/30/20 at 14:45


Naloxone HCl (Narcan) 0.4 mg PRN Q2MIN  PRN IV SEE INSTRUCTIONS;  Start 6/30/20 

at 14:45


Sodium Chloride 1,000 ml @  25 mls/hr Q24H IV  Last administered on 7/21/20at 14

:54;  Start 6/30/20 at 14:33


Morphine Sulfate (Morphine Sulfate) 1 mg PRN Q1HR  PRN IV PAIN;  Start 6/30/20 

at 14:45


Midazolam HCl 100 mg/Sodium Chloride 100 ml @ 1 mls/hr CONT  PRN IV SEE I/O 

RECORD Last administered on 7/3/20at 18:48;  Start 6/30/20 at 14:45


Phenylephrine HCl (PHENYLEPHRINE in 0.9% NACL PF) 1 mg STK-MED ONCE IV ;  Start 

6/30/20 at 14:44;  Stop 6/30/20 at 14:45;  Status DC


Ephedrine Sulfate (ePHEDrine PF IN SALINE SYRINGE) 50 mg STK-MED ONCE IV ;  

Start 6/30/20 at 14:45;  Stop 6/30/20 at 14:45;  Status DC


Vasopressin 20 unit/Dextrose 101 ml @  12 mls/hr CONT  PRN IV SEE I/O RECORD 

Last administered on 7/7/20at 04:17;  Start 6/30/20 at 15:30


Sodium Chloride 1,000 ml @  1,000 mls/hr 1X  ONCE IV  Last administered on 

6/30/20at 15:42;  Start 6/30/20 at 15:45;  Stop 6/30/20 at 16:44;  Status DC


Albumin Human 500 ml @  125 mls/hr 1X  ONCE IV ;  Start 6/30/20 at 16:00;  Stop 

6/30/20 at 19:59;  Status DC


Albumin Human 500 ml @  125 mls/hr PRN Q1HR  PRN IV PER PROTOCOL;  Start 6/30/20

at 15:45


Magnesium Sulfate 50 ml @ 25 mls/hr 1X  ONCE IV  Last administered on 6/30/20at 

17:02;  Start 6/30/20 at 16:30;  Stop 6/30/20 at 18:29;  Status DC


Sodium Bicarbonate (Sodium Bicarb Adult 8.4% Syr) 50 meq STK-MED ONCE .ROUTE ;  

Start 6/30/20 at 16:20;  Stop 6/30/20 at 16:20;  Status DC


Sodium Bicarbonate (Sodium Bicarb Adult 8.4% Syr) 100 meq 1X  ONCE IV  Last 

administered on 6/30/20at 17:07;  Start 6/30/20 at 16:30;  Stop 6/30/20 at 

16:31;  Status DC


Sodium Bicarbonate 150 meq/Dextrose 1,150 ml @  75 mls/hr 1X  ONCE IV  Last 

administered on 6/30/20at 20:02;  Start 6/30/20 at 16:30;  Stop 7/1/20 at 07:49;

 Status DC


Sodium Chloride 80 meq/Potassium Chloride 30 meq/ Potassium Acetate 30 

meq/Magnesium Sulfate 14 meq/ Multivitamins 10 ml/Chromium/ Copper/Manganese/ Se

aram/Zn 1 ml/ Insulin Human Regular 15 unit/ Total Parenteral Nutrition/Amino 

Acids/Dextrose/ Fat Emulsion Intravenous 1,920 ml @  80 mls/hr TPN  CONT IV  

Last administered on 7/1/20at 23:05;  Start 7/1/20 at 22:00;  Stop 7/2/20 at 

21:59;  Status DC


Sodium Chloride 100 meq/Potassium Chloride 30 meq/ Potassium Acetate 30 

meq/Magnesium Sulfate 12 meq/ Multivitamins 10 ml/Chromium/ Copper/Manganese/ 

Seleni/Zn 1 ml/ Insulin Human Regular 15 unit/ Total Parenteral Nutrition/Amino 

Acids/Dextrose/ Fat Emulsion Intravenous 1,920 ml @  80 mls/hr TPN  CONT IV  

Last administered on 7/2/20at 21:52;  Start 7/2/20 at 22:00;  Stop 7/3/20 at 

21:59;  Status DC


Sodium Chloride 100 meq/Potassium Chloride 30 meq/ Potassium Acetate 30 

meq/Magnesium Sulfate 12 meq/ Multivitamins 10 ml/Chromium/ Copper/Manganese/ 

Seleni/Zn 1 ml/ Insulin Human Regular 15 unit/ Total Parenteral Nutrition/Amino 

Acids/Dextrose/ Fat Emulsion Intravenous 1,920 ml @  80 mls/hr TPN  CONT IV  

Last administered on 7/3/20at 21:46;  Start 7/3/20 at 22:00;  Stop 7/4/20 at 

21:59;  Status DC


Sodium Chloride 100 meq/Potassium Chloride 30 meq/ Potassium Acetate 30 

meq/Magnesium Sulfate 12 meq/ Multivitamins 10 ml/Chromium/ Copper/Manganese/ 

Seleni/Zn 1 ml/ Insulin Human Regular 15 unit/ Total Parenteral Nutrition/Amino 

Acids/Dextrose/ Fat Emulsion Intravenous 1,800 ml @  75 mls/hr TPN  CONT IV  

Last administered on 7/4/20at 22:04;  Start 7/4/20 at 22:00;  Stop 7/5/20 at 

21:59;  Status DC


Fentanyl Citrate 55 ml @ 0 mls/hr CONT  PRN IV SEE COMMENTS Last administered on

7/6/20at 23:55;  Start 7/4/20 at 13:00;  Stop 7/9/20 at 17:28;  Status DC


Sodium Chloride 100 meq/Potassium Chloride 30 meq/ Potassium Acetate 30 

meq/Magnesium Sulfate 12 meq/ Multivitamins 10 ml/Chromium/ Copper/Manganese/ 

Seleni/Zn 1 ml/ Insulin Human Regular 15 unit/ Total Parenteral Nutrition/Amino 

Acids/Dextrose/ Fat Emulsion Intravenous 1,680 ml @  70 mls/hr TPN  CONT IV  La

st administered on 7/5/20at 21:23;  Start 7/5/20 at 22:00;  Stop 7/6/20 at 

21:59;  Status DC


Sodium Chloride 110 meq/Potassium Chloride 30 meq/ Potassium Acetate 30 

meq/Magnesium Sulfate 15 meq/ Multivitamins 10 ml/Chromium/ Copper/Manganese/ 

Seleni/Zn 1 ml/ Insulin Human Regular 15 unit/ Total Parenteral Nutrition/Amino 

Acids/Dextrose/ Fat Emulsion Intravenous 1,680 ml @  70 mls/hr TPN  CONT IV  

Last administered on 7/6/20at 21:48;  Start 7/6/20 at 22:00;  Stop 7/7/20 at 

21:59;  Status DC


Sodium Chloride 110 meq/Potassium Chloride 30 meq/ Potassium Acetate 30 

meq/Magnesium Sulfate 15 meq/ Multivitamins 10 ml/Chromium/ Copper/Manganese/ 

Seleni/Zn 1 ml/ Insulin Human Regular 15 unit/ Total Parenteral Nutrition/Amino 

Acids/Dextrose/ Fat Emulsion Intravenous 1,680 ml @  70 mls/hr TPN  CONT IV  

Last administered on 7/7/20at 21:33;  Start 7/7/20 at 22:00;  Stop 7/8/20 at 

21:59;  Status DC


Sodium Chloride 110 meq/Potassium Chloride 30 meq/ Potassium Acetate 30 

meq/Magnesium Sulfate 15 meq/ Multivitamins 10 ml/Chromium/ Copper/Manganese/ 

Seleni/Zn 1 ml/ Insulin Human Regular 15 unit/ Total Parenteral Nutrition/Amino 

Acids/Dextrose/ Fat Emulsion Intravenous 1,680 ml @  70 mls/hr TPN  CONT IV  

Last administered on 7/8/20at 21:51;  Start 7/8/20 at 22:00;  Stop 7/9/20 at 

21:59;  Status DC


Sodium Chloride 90 meq/Potassium Chloride 30 meq/ Potassium Acetate 30 

meq/Magnesium Sulfate 15 meq/ Multivitamins 10 ml/Chromium/ Copper/Manganese/ 

Seleni/Zn 1 ml/ Insulin Human Regular 15 unit/ Total Parenteral Nutrition/Amino 

Acids/Dextrose/ Fat Emulsion Intravenous 1,680 ml @  70 mls/hr TPN  CONT IV  

Last administered on 7/9/20at 22:38;  Start 7/9/20 at 22:00;  Stop 7/10/20 at 

21:59;  Status DC


Fentanyl Citrate 30 ml @ 0 mls/hr CONT  PRN IV SEE I/O RECORD;  Start 7/9/20 at 

17:30


Fentanyl (Duragesic 12mcg/ Hr Patch) 1 patch Q3DAYS TD  Last administered on 

7/22/20at 09:00;  Start 7/10/20 at 09:00


Sodium Chloride 90 meq/Potassium Chloride 30 meq/ Potassium Acetate 30 

meq/Magnesium Sulfate 15 meq/ Multivitamins 10 ml/Chromium/ Copper/Manganese/ 

Seleni/Zn 1 ml/ Insulin Human Regular 15 unit/ Total Parenteral Nutrition/Amino 

Acids/Dextrose/ Fat Emulsion Intravenous 1,680 ml @  70 mls/hr TPN  CONT IV  

Last administered on 7/10/20at 21:59;  Start 7/10/20 at 22:00;  Stop 7/11/20 at 

21:59;  Status DC


Sodium Chloride 90 meq/Potassium Chloride 30 meq/ Potassium Acetate 30 

meq/Magnesium Sulfate 15 meq/ Multivitamins 10 ml/Chromium/ Copper/Manganese/ 

Seleni/Zn 1 ml/ Insulin Human Regular 15 unit/ Total Parenteral Nutrition/Amino 

Acids/Dextrose/ Fat Emulsion Intravenous 1,680 ml @  70 mls/hr TPN  CONT IV  

Last administered on 7/11/20at 21:35;  Start 7/11/20 at 22:00;  Stop 7/12/20 at 

21:59;  Status DC


Vancomycin HCl (Vanco Per Pharmacy) 1 each PRN DAILY  PRN MC SEE COMMENTS Last 

administered on 7/14/20at 02:46;  Start 7/12/20 at 09:15;  Stop 7/15/20 at 

07:41;  Status DC


Ciprofloxacin/ Dextrose 200 ml @  200 mls/hr Q12HR IV  Last administered on 

7/20/20at 21:02;  Start 7/12/20 at 10:00;  Stop 7/21/20 at 08:20;  Status DC


Vancomycin HCl 2 gm/Sodium Chloride 500 ml @  250 mls/hr 1X  ONCE IV  Last 

administered on 7/12/20at 10:34;  Start 7/12/20 at 10:00;  Stop 7/12/20 at 

11:59;  Status DC


Sodium Chloride 90 meq/Potassium Chloride 30 meq/ Potassium Acetate 30 

meq/Magnesium Sulfate 15 meq/ Multivitamins 10 ml/Chromium/ Copper/Manganese/ 

Seleni/Zn 1 ml/ Insulin Human Regular 15 unit/ Total Parenteral Nutrition/Amino 

Acids/Dextrose/ Fat Emulsion Intravenous 1,680 ml @  70 mls/hr TPN  CONT IV  

Last administered on 7/12/20at 22:02;  Start 7/12/20 at 22:00;  Stop 7/13/20 at 

21:59;  Status DC


Diphenhydramine HCl (Benadryl) 25 mg 1X  ONCE IVP  Last administered on 

7/12/20at 14:26;  Start 7/12/20 at 14:30;  Stop 7/12/20 at 14:31;  Status DC


Vancomycin HCl 1.5 gm/Sodium Chloride 500 ml @  250 mls/hr Q8H IV  Last 

administered on 7/13/20at 03:08;  Start 7/12/20 at 18:30;  Stop 7/13/20 at 

12:24;  Status DC


Vancomycin HCl (Vancomycin Trough Level) 1 each 1X  ONCE MC  Last administered 

on 7/13/20at 10:00;  Start 7/13/20 at 10:00;  Stop 7/13/20 at 10:01;  Status DC


Sodium Chloride 90 meq/Potassium Chloride 30 meq/ Potassium Acetate 30 

meq/Magnesium Sulfate 15 meq/ Multivitamins 10 ml/Chromium/ Copper/Manganese/ 

Seleni/Zn 1 ml/ Insulin Human Regular 15 unit/ Total Parenteral Nutrition/Amino 

Acids/Dextrose/ Fat Emulsion Intravenous 1,680 ml @  70 mls/hr TPN  CONT IV  

Last administered on 7/13/20at 22:13;  Start 7/13/20 at 22:00;  Stop 7/14/20 at 

21:59;  Status DC


Vancomycin HCl (Vancomycin Random Level) 1 each 1X  ONCE MC  Last administered 

on 7/14/20at 01:00;  Start 7/14/20 at 01:00;  Stop 7/14/20 at 01:01;  Status DC


Vancomycin HCl 1.5 gm/Sodium Chloride 500 ml @  250 mls/hr Q12H IV  Last 

administered on 7/14/20at 22:07;  Start 7/14/20 at 10:00;  Stop 7/15/20 at 

07:41;  Status DC


Vancomycin HCl (Vancomycin Trough Level) 1 each 1X  ONCE MC ;  Start 7/15/20 at 

09:30;  Stop 7/15/20 at 09:31;  Status Cancel


Sodium Chloride 90 meq/Potassium Chloride 30 meq/ Potassium Acetate 30 

meq/Magnesium Sulfate 15 meq/ Multivitamins 10 ml/Chromium/ Copper/Manganese/ 

Seleni/Zn 1 ml/ Insulin Human Regular 15 unit/ Total Parenteral Nutrition/Amino 

Acids/Dextrose/ Fat Emulsion Intravenous 1,680 ml @  70 mls/hr TPN  CONT IV  

Last administered on 7/14/20at 22:08;  Start 7/14/20 at 22:00;  Stop 7/15/20 at 

21:59;  Status DC


Alteplase, Recombinant (Cathflo For Central Catheter Clearance) 1 mg 1X  ONCE 

INT CAT  Last administered on 7/14/20at 11:49;  Start 7/14/20 at 11:00;  Stop 

7/14/20 at 11:01;  Status DC


Daptomycin 500 mg/ Sodium Chloride 50 ml @  100 mls/hr Q24H IV  Last 

administered on 7/24/20at 08:25;  Start 7/15/20 at 09:00;  Stop 7/24/20 at 

08:38;  Status DC


Sodium Chloride 90 meq/Potassium Chloride 30 meq/ Potassium Acetate 30 

meq/Magnesium Sulfate 15 meq/ Multivitamins 10 ml/Chromium/ Copper/Manganese/ 

Seleni/Zn 1 ml/ Insulin Human Regular 15 unit/ Total Parenteral Nutrition/Amino 

Acids/Dextrose/ Fat Emulsion Intravenous 1,680 ml @  70 mls/hr TPN  CONT IV  

Last administered on 7/15/20at 22:55;  Start 7/15/20 at 22:00;  Stop 7/16/20 at 

21:59;  Status DC


Sodium Chloride 90 meq/Potassium Chloride 30 meq/ Potassium Acetate 30 

meq/Magnesium Sulfate 15 meq/ Multivitamins 10 ml/Chromium/ Copper/Manganese/ 

Seleni/Zn 1 ml/ Insulin Human Regular 15 unit/ Total Parenteral Nutrition/Amino 

Acids/Dextrose/ Fat Emulsion Intravenous 1,680 ml @  70 mls/hr TPN  CONT IV  L

ast administered on 7/16/20at 22:06;  Start 7/16/20 at 22:00;  Stop 7/17/20 at 

21:59;  Status DC


Diphenhydramine HCl (Benadryl) 50 mg STK-MED ONCE .ROUTE ;  Start 7/16/20 at 

18:34;  Stop 7/16/20 at 18:35;  Status DC


Diphenhydramine HCl (Benadryl) 25 mg 1X  ONCE IM ;  Start 7/16/20 at 18:45;  

Stop 7/16/20 at 18:46;  Status DC


Diphenhydramine HCl (Benadryl) 25 mg 1X  ONCE IVP  Last administered on 

7/16/20at 18:56;  Start 7/16/20 at 19:00;  Stop 7/16/20 at 19:01;  Status DC


Alprazolam (Xanax) 0.5 mg PRN TID  PRN PO ANXIETY / AGITATION Last administered 

on 7/23/20at 11:49;  Start 7/17/20 at 08:00;  Stop 7/23/20 at 15:54;  Status DC


Sodium Chloride 110 meq/Potassium Chloride 30 meq/ Potassium Acetate 30 

meq/Magnesium Sulfate 15 meq/ Multivitamins 10 ml/Chromium/ Copper/Manganese/ 

Seleni/Zn 1 ml/ Insulin Human Regular 15 unit/ Total Parenteral Nutrition/Amino 

Acids/Dextrose/ Fat Emulsion Intravenous 1,680 ml @  70 mls/hr TPN  CONT IV  

Last administered on 7/17/20at 21:21;  Start 7/17/20 at 22:00;  Stop 7/18/20 at 

21:59;  Status DC


Sodium Chloride 110 meq/Potassium Chloride 30 meq/ Potassium Acetate 30 

meq/Magnesium Sulfate 15 meq/ Multivitamins 10 ml/Chromium/ Copper/Manganese/ 

Seleni/Zn 1 ml/ Insulin Human Regular 15 unit/ Total Parenteral Nutrition/Amino 

Acids/Dextrose/ Fat Emulsion Intravenous 1,680 ml @  70 mls/hr TPN  CONT IV  

Last administered on 7/18/20at 22:01;  Start 7/18/20 at 22:00;  Stop 7/19/20 at 

21:59;  Status DC


Alteplase, Recombinant (Cathflo For Central Catheter Clearance) 1 mg 1X  ONCE 

INT CAT  Last administered on 7/19/20at 08:23;  Start 7/19/20 at 08:15;  Stop 

7/19/20 at 08:16;  Status DC


Sodium Chloride 110 meq/Sodium Phosphate 10 mmol/ Potassium Chloride 30 meq/ 

Potassium Acetate 30 meq/Magnesium Sulfate 15 meq/ Multivitamins 10 ml/Chromium/

Copper/Manganese/ Seleni/Zn 1 ml/ Insulin Human Regular 15 unit/ Total 

Parenteral Nutrition/Amino Acids/Dextrose/ Fat Emulsion Intravenous 1,680 ml @  

70 mls/hr TPN  CONT IV  Last administered on 7/19/20at 22:03;  Start 7/19/20 at 

22:00;  Stop 7/20/20 at 21:59;  Status DC


Sodium Chloride 120 meq/Sodium Phosphate 10 mmol/ Potassium Chloride 30 meq/ 

Potassium Acetate 30 meq/Magnesium Sulfate 15 meq/ Multivitamins 10 ml/Chromium/

Copper/Manganese/ Seleni/Zn 1 ml/ Insulin Human Regular 15 unit/ Total 

Parenteral Nutrition/Amino Acids/Dextrose/ Fat Emulsion Intravenous 1,680 ml @  

70 mls/hr TPN  CONT IV  Last administered on 7/20/20at 22:08;  Start 7/20/20 at 

22:00;  Stop 7/21/20 at 21:59;  Status DC


Ceftazidime/ Avibactam 2.5 gm/ Sodium Chloride 100 ml @  50 mls/hr Q8HRS IV  

Last administered on 7/22/20at 05:32;  Start 7/21/20 at 14:00;  Stop 7/22/20 at 

07:48;  Status DC


Alteplase, Recombinant (Cathflo For Central Catheter Clearance) 1 mg 1X  ONCE 

INT CAT  Last administered on 7/21/20at 09:30;  Start 7/21/20 at 09:30;  Stop 

7/21/20 at 09:31;  Status DC


Sodium Chloride 120 meq/Sodium Phosphate 10 mmol/ Potassium Chloride 30 meq/ 

Potassium Acetate 30 meq/Magnesium Sulfate 15 meq/ Multivitamins 10 ml/Chromium/

Copper/Manganese/ Seleni/Zn 1 ml/ Insulin Human Regular 15 unit/ Total 

Parenteral Nutrition/Amino Acids/Dextrose/ Fat Emulsion Intravenous 1,680 ml @  

70 mls/hr TPN  CONT IV  Last administered on 7/21/20at 22:11;  Start 7/21/20 at 

22:00;  Stop 7/22/20 at 21:59;  Status DC


Iohexol (Omnipaque 300 Mg/ml) 75 ml 1X  ONCE IV  Last administered on 7/21/20at 

11:30;  Start 7/21/20 at 11:30;  Stop 7/21/20 at 11:31;  Status DC


Info (CONTRAST GIVEN -- Rx MONITORING) 1 each PRN DAILY  PRN MC SEE COMMENTS;  

Start 7/21/20 at 11:45;  Stop 7/23/20 at 11:44;  Status DC


Alteplase, Recombinant (Cathflo For Central Catheter Clearance) 1 mg 1X  ONCE 

INT CAT  Last administered on 7/21/20at 12:17;  Start 7/21/20 at 12:00;  Stop 

7/21/20 at 12:01;  Status DC


Cefepime HCl (Maxipime) 2 gm Q12HR IVP  Last administered on 7/22/20at 20:53;  

Start 7/22/20 at 09:00;  Stop 7/23/20 at 07:30;  Status DC


Sodium Chloride 120 meq/Sodium Phosphate 10 mmol/ Potassium Chloride 30 meq/ 

Potassium Acetate 30 meq/Magnesium Sulfate 15 meq/ Multivitamins 10 ml/Chromium/

Copper/Manganese/ Seleni/Zn 1 ml/ Insulin Human Regular 15 unit/ Total 

Parenteral Nutrition/Amino Acids/Dextrose/ Fat Emulsion Intravenous 1,680 ml @  

70 mls/hr TPN  CONT IV  Last administered on 7/22/20at 21:25;  Start 7/22/20 at 

22:00;  Stop 7/23/20 at 21:59;  Status DC


Ceftazidime/ Avibactam 2.5 gm/ Sodium Chloride 250 ml @  125 mls/hr Q8HRS IV  

Last administered on 7/24/20at 06:00;  Start 7/23/20 at 08:00


Sodium Chloride 120 meq/Sodium Phosphate 10 mmol/ Potassium Chloride 30 meq/ 

Potassium Acetate 30 meq/Magnesium Sulfate 15 meq/ Multivitamins 10 ml/Chromium/

Copper/Manganese/ Seleni/Zn 1 ml/ Insulin Human Regular 15 unit/ Total 

Parenteral Nutrition/Amino Acids/Dextrose/ Fat Emulsion Intravenous 1,680 ml @  

70 mls/hr TPN  CONT IV  Last administered on 7/23/20at 22:35;  Start 7/23/20 at 

22:00;  Stop 7/24/20 at 21:59


Alprazolam (Xanax) 0.5 mg PRN QID  PRN PO ANXIETY / AGITATION Last administered 

on 7/23/20at 22:31;  Start 7/23/20 at 16:00


Acetaminophen/ Hydrocodone Bitart (Lortab 5/325) 1 tab PRN Q4HRS  PRN PO PAIN 

Last administered on 7/23/20at 20:49;  Start 7/23/20 at 16:00





Active Scripts


Active


Reported


Bisoprolol Fumarate 5 Mg Tablet 10 Mg PO DAILY


Vitals/I & O





Vital Sign - Last 24 Hours








 7/23/20 7/23/20 7/23/20 7/23/20





 12:00 12:30 15:35 16:00


 


Temp 97.9   99.0





 97.9   99.0


 


Pulse 145 146  128


 


Resp 28   32


 


B/P (MAP) 145/90 (108) 145/90  126/73 (90)


 


Pulse Ox 94  100 93


 


O2 Delivery Room Air  Room Air Room Air


 


    





    





 7/23/20 7/23/20 7/23/20 7/23/20





 16:32 17:16 19:50 20:00


 


Resp 36 36  


 


Pulse Ox 96 96 96 


 


O2 Delivery Room Air Room Air Room Air Room Air





 7/23/20 7/23/20 7/23/20 7/23/20





 20:00 20:49 21:19 22:32


 


Temp 99.5   





 99.5   


 


Pulse 140  150 


 


Resp 43 40  35


 


B/P (MAP) 141/97 (112)  154/95 


 


Pulse Ox 97 97  


 


O2 Delivery Room Air   Room Air


 


    





    





 7/23/20 7/23/20 7/24/20 7/24/20





 23:02 23:04 00:00 01:00


 


Pulse   130 


 


Resp 38 39 30 


 


B/P (MAP)   121/81 (94) 


 


Pulse Ox   97 96


 


O2 Delivery Room Air Room Air Room Air Nasal Cannula





 7/24/20 7/24/20 7/24/20 7/24/20





 04:00 06:00 08:00 08:01


 


Temp  98.8 99.0 





  98.8 99.0 


 


Pulse 135  133 


 


Resp 37  33 


 


B/P (MAP) 119/68 (85)  123/63 (83) 


 


Pulse Ox 98  98 97


 


O2 Delivery Room Air  Room Air Room Air














Intake and Output   


 


 7/23/20 7/23/20 7/24/20





 15:00 23:00 07:00


 


Intake Total 350 ml 965 ml 1120 ml


 


Output Total 945 ml 555 ml 1370 ml


 


Balance -595 ml 410 ml -250 ml











Justicifation of Admission Dx:


Justifications for Admission:


Justification of Admission Dx:  Yes











OVIDIO SARAVIA MD          Jul 24, 2020 09:07

## 2020-07-24 NOTE — PDOC
Objective:


Objective:


D/w nurse - lots of ROBERT output.


Vital Signs:





                                   Vital Signs








  Date Time  Temp Pulse Resp B/P (MAP) Pulse Ox O2 Delivery O2 Flow Rate FiO2


 


7/24/20 09:52   36  97 Room Air  


 


7/24/20 08:00 99.0 133  123/63 (83)    





 99.0       


 


7/23/20 08:43       2.0 








Labs:





Laboratory Tests








Test


 7/23/20


12:26 7/23/20


18:12 7/24/20


00:02 7/24/20


06:00


 


Glucose (Fingerstick) 143 mg/dL  126 mg/dL  138 mg/dL  


 


White Blood Count    16.9 x10^3/uL 


 


Red Blood Count    2.66 x10^6/uL 


 


Hemoglobin    7.9 g/dL 


 


Hematocrit    24.3 % 


 


Mean Corpuscular Volume    91 fL 


 


Mean Corpuscular Hemoglobin    30 pg 


 


Mean Corpuscular Hemoglobin


Concent 


 


 


 32 g/dL 





 


Red Cell Distribution Width    16.7 % 


 


Platelet Count    569 x10^3/uL 


 


Neutrophils (%) (Auto)    83 % 


 


Lymphocytes (%) (Auto)    9 % 


 


Monocytes (%) (Auto)    6 % 


 


Eosinophils (%) (Auto)    2 % 


 


Basophils (%) (Auto)    1 % 


 


Neutrophils # (Auto)    14.1 x10^3/uL 


 


Lymphocytes # (Auto)    1.5 x10^3/uL 


 


Monocytes # (Auto)    1.0 x10^3/uL 


 


Eosinophils # (Auto)    0.3 x10^3/uL 


 


Basophils # (Auto)    0.1 x10^3/uL 


 


Test


 7/24/20


06:21 7/24/20


07:30 


 





 


Glucose (Fingerstick) 150 mg/dL    


 


Sodium Level  136 mmol/L   


 


Potassium Level  4.2 mmol/L   


 


Chloride Level  103 mmol/L   


 


Carbon Dioxide Level  26 mmol/L   


 


Anion Gap  7   


 


Blood Urea Nitrogen  13 mg/dL   


 


Creatinine  0.6 mg/dL   


 


Estimated GFR


(Cockcroft-Gault) 


 106.3 


 


 





 


BUN/Creatinine Ratio  22   


 


Glucose Level  125 mg/dL   


 


Calcium Level  9.4 mg/dL   


 


Total Bilirubin  0.5 mg/dL   


 


Aspartate Amino Transf


(AST/SGOT) 


 16 U/L 


 


 





 


Alanine Aminotransferase


(ALT/SGPT) 


 15 U/L 


 


 





 


Alkaline Phosphatase  129 U/L   


 


Total Protein  5.1 g/dL   


 


Albumin  1.1 g/dL   


 


Albumin/Globulin Ratio  0.3   





COMMENTS: CHEST FLUID                                                 


---------------------------------------------------------------

-----------------------------





  Procedure                         Result                                      

         


------

--------------------------------------------------------------------------------


------





  GRAM STAIN  Final  


        Final





        NO ORGANISMS SEEN.


        SQUAMOUS EPI CELL:NOT APPLICABLE


        PMN (WBCs):RARE


        Unless otherwise specified, Testing Performed by:


        [address] [address]


        For Inquires, the Physician may contact the Microbiology


        department at [phone]





  ANAEROBIC-AEROBIC CULTURE  Preliminary  


        Preliminary





        No Growth on 07/22/20 at 0957


        FEW YEAST on 07/23/20 at 1500


        FINAL ID= [NAHID PARAPSILOSIS]


        CANDIDA PARAPSILOSIS








  BLOOD CULTURE  Preliminary  


        NO GROWTH AFTER 2 DAYS





PE:





GEN: chronically ill


LUNGS: room air


HEART: tachycardic


ABD: drains, on TPN


NEURO/PSYCH: awake and alert, waves





A/P:


S/p pancreatic necrosectomy





--


Supportive care.





Justicifation of Admission Dx:


Justifications for Admission:


Justification of Admission Dx:  Yes











RAGHAVENDRA MURCIA         Jul 24, 2020 10:23

## 2020-07-24 NOTE — NUR
Chest tube removed per Dr Castano. Dr Castano bedside at time of removal. Site cleaned with 
alcohol and dressed with 2X2 and medi pore tape.

## 2020-07-24 NOTE — PDOC
PULMONARY PROGRESS NOTES


Subjective


Patient with no distress


Vitals





Vital Signs








  Date Time  Temp Pulse Resp B/P (MAP) Pulse Ox O2 Delivery O2 Flow Rate FiO2


 


7/24/20 11:26   36   Nasal Cannula 2.0 


 


7/24/20 09:52     97   


 


7/24/20 08:00 99.0 133  123/63 (83)    





 99.0       








ROS:  No Nausea, No Chest Pain, No Increase Cough


HEENT:  Other (trach site ok)


Lungs:  Crackles


Cardiovascular:  S1, S2


Abdomen:  Soft, Non-tender, Other (multiple ROBERT drains )


Neuro Exam:  Alert


Extremities:  Other (+1 BLE edema)


Skin:  Warm


Labs





Laboratory Tests








Test


 7/22/20


13:32 7/22/20


17:43 7/23/20


01:25 7/23/20


06:00


 


Glucose (Fingerstick)


 144 mg/dL


(70-99) 134 mg/dL


(70-99) 132 mg/dL


(70-99) 





 


Sodium Level


 


 


 


 134 mmol/L


(136-145)


 


Potassium Level


 


 


 


 4.4 mmol/L


(3.5-5.1)


 


Chloride Level


 


 


 


 102 mmol/L


()


 


Carbon Dioxide Level


 


 


 


 28 mmol/L


(21-32)


 


Anion Gap    4 (6-14) 


 


Blood Urea Nitrogen


 


 


 


 14 mg/dL


(7-20)


 


Creatinine


 


 


 


 0.5 mg/dL


(0.6-1.0)


 


Estimated GFR


(Cockcroft-Gault) 


 


 


 131.1 





 


Glucose Level


 


 


 


 121 mg/dL


(70-99)


 


Calcium Level


 


 


 


 9.7 mg/dL


(8.5-10.1)


 


Phosphorus Level


 


 


 


 4.1 mg/dL


(2.6-4.7)


 


Magnesium Level


 


 


 


 1.9 mg/dL


(1.8-2.4)


 


Triglycerides Level


 


 


 


 181 mg/dL


(0-150)


 


Test


 7/23/20


06:04 7/23/20


09:00 7/23/20


12:26 7/23/20


18:12


 


Glucose (Fingerstick)


 117 mg/dL


(70-99) 


 143 mg/dL


(70-99) 126 mg/dL


(70-99)


 


White Blood Count


 


 18.4 x10^3/uL


(4.0-11.0) 


 





 


Red Blood Count


 


 2.92 x10^6/uL


(3.50-5.40) 


 





 


Hemoglobin


 


 8.2 g/dL


(12.0-15.5) 


 





 


Hematocrit


 


 25.0 %


(36.0-47.0) 


 





 


Mean Corpuscular Volume  86 fL ()   


 


Mean Corpuscular Hemoglobin  28 pg (25-35)   


 


Mean Corpuscular Hemoglobin


Concent 


 33 g/dL


(31-37) 


 





 


Red Cell Distribution Width


 


 15.6 %


(11.5-14.5) 


 





 


Platelet Count


 


 532 x10^3/uL


(140-400) 


 





 


Neutrophils (%) (Auto)  85 % (31-73)   


 


Lymphocytes (%) (Auto)  6 % (24-48)   


 


Monocytes (%) (Auto)  5 % (0-9)   


 


Eosinophils (%) (Auto)  3 % (0-3)   


 


Basophils (%) (Auto)  1 % (0-3)   


 


Neutrophils # (Auto)


 


 15.7 x10^3/uL


(1.8-7.7) 


 





 


Lymphocytes # (Auto)


 


 1.2 x10^3/uL


(1.0-4.8) 


 





 


Monocytes # (Auto)


 


 0.9 x10^3/uL


(0.0-1.1) 


 





 


Eosinophils # (Auto)


 


 0.6 x10^3/uL


(0.0-0.7) 


 





 


Basophils # (Auto)


 


 0.1 x10^3/uL


(0.0-0.2) 


 





 


Test


 7/24/20


00:02 7/24/20


06:00 7/24/20


06:21 7/24/20


07:30


 


Glucose (Fingerstick)


 138 mg/dL


(70-99) 


 150 mg/dL


(70-99) 





 


White Blood Count


 


 16.9 x10^3/uL


(4.0-11.0) 


 





 


Red Blood Count


 


 2.66 x10^6/uL


(3.50-5.40) 


 





 


Hemoglobin


 


 7.9 g/dL


(12.0-15.5) 


 





 


Hematocrit


 


 24.3 %


(36.0-47.0) 


 





 


Mean Corpuscular Volume  91 fL ()   


 


Mean Corpuscular Hemoglobin  30 pg (25-35)   


 


Mean Corpuscular Hemoglobin


Concent 


 32 g/dL


(31-37) 


 





 


Red Cell Distribution Width


 


 16.7 %


(11.5-14.5) 


 





 


Platelet Count


 


 569 x10^3/uL


(140-400) 


 





 


Neutrophils (%) (Auto)  83 % (31-73)   


 


Lymphocytes (%) (Auto)  9 % (24-48)   


 


Monocytes (%) (Auto)  6 % (0-9)   


 


Eosinophils (%) (Auto)  2 % (0-3)   


 


Basophils (%) (Auto)  1 % (0-3)   


 


Neutrophils # (Auto)


 


 14.1 x10^3/uL


(1.8-7.7) 


 





 


Lymphocytes # (Auto)


 


 1.5 x10^3/uL


(1.0-4.8) 


 





 


Monocytes # (Auto)


 


 1.0 x10^3/uL


(0.0-1.1) 


 





 


Eosinophils # (Auto)


 


 0.3 x10^3/uL


(0.0-0.7) 


 





 


Basophils # (Auto)


 


 0.1 x10^3/uL


(0.0-0.2) 


 





 


Sodium Level


 


 


 


 136 mmol/L


(136-145)


 


Potassium Level


 


 


 


 4.2 mmol/L


(3.5-5.1)


 


Chloride Level


 


 


 


 103 mmol/L


()


 


Carbon Dioxide Level


 


 


 


 26 mmol/L


(21-32)


 


Anion Gap    7 (6-14) 


 


Blood Urea Nitrogen


 


 


 


 13 mg/dL


(7-20)


 


Creatinine


 


 


 


 0.6 mg/dL


(0.6-1.0)


 


Estimated GFR


(Cockcroft-Gault) 


 


 


 106.3 





 


BUN/Creatinine Ratio    22 (6-20) 


 


Glucose Level


 


 


 


 125 mg/dL


(70-99)


 


Calcium Level


 


 


 


 9.4 mg/dL


(8.5-10.1)


 


Total Bilirubin


 


 


 


 0.5 mg/dL


(0.2-1.0)


 


Aspartate Amino Transf


(AST/SGOT) 


 


 


 16 U/L (15-37) 





 


Alanine Aminotransferase


(ALT/SGPT) 


 


 


 15 U/L (14-59) 





 


Alkaline Phosphatase


 


 


 


 129 U/L


()


 


Total Protein


 


 


 


 5.1 g/dL


(6.4-8.2)


 


Albumin


 


 


 


 1.1 g/dL


(3.4-5.0)


 


Albumin/Globulin Ratio    0.3 (1.0-1.7) 








Laboratory Tests








Test


 7/23/20


12:26 7/23/20


18:12 7/24/20


00:02 7/24/20


06:00


 


Glucose (Fingerstick)


 143 mg/dL


(70-99) 126 mg/dL


(70-99) 138 mg/dL


(70-99) 





 


White Blood Count


 


 


 


 16.9 x10^3/uL


(4.0-11.0)


 


Red Blood Count


 


 


 


 2.66 x10^6/uL


(3.50-5.40)


 


Hemoglobin


 


 


 


 7.9 g/dL


(12.0-15.5)


 


Hematocrit


 


 


 


 24.3 %


(36.0-47.0)


 


Mean Corpuscular Volume    91 fL () 


 


Mean Corpuscular Hemoglobin    30 pg (25-35) 


 


Mean Corpuscular Hemoglobin


Concent 


 


 


 32 g/dL


(31-37)


 


Red Cell Distribution Width


 


 


 


 16.7 %


(11.5-14.5)


 


Platelet Count


 


 


 


 569 x10^3/uL


(140-400)


 


Neutrophils (%) (Auto)    83 % (31-73) 


 


Lymphocytes (%) (Auto)    9 % (24-48) 


 


Monocytes (%) (Auto)    6 % (0-9) 


 


Eosinophils (%) (Auto)    2 % (0-3) 


 


Basophils (%) (Auto)    1 % (0-3) 


 


Neutrophils # (Auto)


 


 


 


 14.1 x10^3/uL


(1.8-7.7)


 


Lymphocytes # (Auto)


 


 


 


 1.5 x10^3/uL


(1.0-4.8)


 


Monocytes # (Auto)


 


 


 


 1.0 x10^3/uL


(0.0-1.1)


 


Eosinophils # (Auto)


 


 


 


 0.3 x10^3/uL


(0.0-0.7)


 


Basophils # (Auto)


 


 


 


 0.1 x10^3/uL


(0.0-0.2)


 


Test


 7/24/20


06:21 7/24/20


07:30 


 





 


Glucose (Fingerstick)


 150 mg/dL


(70-99) 


 


 





 


Sodium Level


 


 136 mmol/L


(136-145) 


 





 


Potassium Level


 


 4.2 mmol/L


(3.5-5.1) 


 





 


Chloride Level


 


 103 mmol/L


() 


 





 


Carbon Dioxide Level


 


 26 mmol/L


(21-32) 


 





 


Anion Gap  7 (6-14)   


 


Blood Urea Nitrogen


 


 13 mg/dL


(7-20) 


 





 


Creatinine


 


 0.6 mg/dL


(0.6-1.0) 


 





 


Estimated GFR


(Cockcroft-Gault) 


 106.3 


 


 





 


BUN/Creatinine Ratio  22 (6-20)   


 


Glucose Level


 


 125 mg/dL


(70-99) 


 





 


Calcium Level


 


 9.4 mg/dL


(8.5-10.1) 


 





 


Total Bilirubin


 


 0.5 mg/dL


(0.2-1.0) 


 





 


Aspartate Amino Transf


(AST/SGOT) 


 16 U/L (15-37) 


 


 





 


Alanine Aminotransferase


(ALT/SGPT) 


 15 U/L (14-59) 


 


 





 


Alkaline Phosphatase


 


 129 U/L


() 


 





 


Total Protein


 


 5.1 g/dL


(6.4-8.2) 


 





 


Albumin


 


 1.1 g/dL


(3.4-5.0) 


 





 


Albumin/Globulin Ratio  0.3 (1.0-1.7)   








Medications





Active Scripts








 Medications  Dose


 Route/Sig


 Max Daily Dose Days Date Category


 


 Bisoprolol


 Fumarate 5 Mg


 Tablet  10 Mg


 PO DAILY


   3/16/20 Reported








Comments








ct reviewed 7/12/20, Decreased left-sided effusion after catheter placement. The




right-sided effusion has increased as has atelectasis.


 





 


There has been exchange or placement of multiple drainage 


tubes and a gastrojejunostomy tube. Both collections are smaller. No 


significant new abdominal fluid collection is seen. The jejunal component 


of the gastrojejunostomy tube appears to be looped in the proximal small 


bowel. 











ct abdomen /pelvis 6/6


1. Removal of the percutaneous pigtail drainage catheters since the prior 


exam. Sequela of pancreatitis with extensive pseudocysts again 


demonstrated, the right-sided collections are slightly larger since the 


prior exam, the left-sided collections are stable. See above.


2. Moderate to large left pleural effusion with atelectasis and collapse 


of most of the left lower lobe, stable. Small right pleural effusion is 


stable.


3. Gallstone.





ct chest 6/15 reviewed








 GRAM NEG COCCOBACILLI:MANY


        SQUAMOUS EPI CELL:RARE


        PMN (WBCs):[id number]


        Unless otherwise specified, Testing Performed by:


        [address] [address]


        For Inquires, the Physician may contact the Microbiology


        department at [phone]





  RESPIRATORY CULTURE  Final  


        Final





        MANY GRAM NEGATIVE RODS on 06/15/20 at 1106


        FINAL ID= [PSEUDOMONAS AERUGINOSA]


        MICRO CHARGES


        PSEUDOMONAS AERUGINOSA





  ANTIMICROBIAL SUSCEPTIBILITY  Final  


        Comment





        NEG ANSON 56


        PSEUDOMONAS AERUGINOSA


        ANTIBIOTIC                        RESULT          INTERPRETATION


        AMIKACIN                          <=16                  S


        AZTREONAM                         <=4                   S


        CEFTAZIDIME                       <=1                   S


        CIPROFLOXACIN                     <=0.25                S


        CEFEPIME                          <=2                   S


        CEFTAZIDIME/AVIBACTAM             <=4                   S


        GENTAMICIN                        <=2                   S


        LEVOFLOXACIN                      <=0.5                 S





Impression


.





IMPRESSION:


1.  Acute hypoxemic respiratory failure secondary to ARDS status post trach, 

developed anemia 6/7, blood drainage from RLQ abdomen drain site, and surro

unding firmness  / developed septic shock 6/7 from abdomen source, required levo

6/7


s/p 3 new drains 6/7 with brown color drainage,  


S/P Exp. 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 

parapsilosis & PSAE (I-merrem/ceftazidime/AZT/cefepime))


Leucocytosis -trending upward 


Fever 


Acute gallstone pancreatitis with persistent necrosis


  - 4/9.  CT A/P Increased ascites. Persistent evidence of necrotizing 

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


  - 4/27. status post ROBERT drain placement; C. parapsilosis. s/p drain 5/6 + yeast

& high amylase; s/p additional drain on 5/8. Drains removed. 


  -5/6. fluid  candida parapsilosis fluid, amylase high


  - 6/6 showed multiple pseudocysts, slight larger on the right. s/p drains x 3,

6/7.  + PSAE (MDRO-R Cefepime, Zosyn ANSON < 64) and yeast, 


  -6/7 s/p drain replacement x 3; fluid cult PSAE (MDRO), yeast; treated


  -7/12 CT A/P shows smaller fluid collections.  


  -722 CT abdomen and pelvis drains in place       


Ascites s/p paracentesis 4/15 & 5/6. C. parapsilosis 


Cholelithiasis with thickening of the gallbladder wall.


JED, Hyperkalemia, Metabolic acidosis off dialysis


Acute hypoxic resp failure. trach/vent. sputum 6/13  + PSAE (I merrem) ; sputum 

culture July 19+ for PSAE R Merrem, sensitive to cefepime


Pleural effusion status post CTS left side


 Abdominal fluid culture 6 /7 MDRO Pseudomonas, yeast


Sputum culture positive 7/19 for MDRO Pseudomonas


Chest tube fluid positive for 7/21 Candida Parapsilosis


S/P Exploratory laparotomy, lysis of adhesions, subtotal cholecystectomy with 

cholangiogram, gastrojejunostomy tube placement, pancreatic necrosectomy


leukocytosis- improving 





7/23 fluid from the chest tube


  GRAM STAIN  Final  


        Final





        NO ORGANISMS SEEN.


        SQUAMOUS EPI CELL:NOT APPLICABLE


        PMN (WBCs):[email or web address]


        Unless otherwise specified, Testing Performed by:


        [address] [place] [address]


        For Inquires, the Physician may contact the Microbiology


        department at [phone]





  ANAEROBIC-AEROBIC CULTURE  Preliminary  


        Preliminary





        No Growth on 07/22/20 at 0957


        Unless otherwise specified, Testing Performed by:


        [address] [address]


        For Inquires, the Physician may contact the Microbiology


        department at [phone]











Antibiotics per ID, since 7/23


Restart Avycaz 


DC cefepime


cont micafungin and dapto





Plan


.


no sig drainage on ct left side, no sig effusion


will remove chest tube


Transfuse as needed


Antibiotics per ID changes made,Change Avycaz to cefepime cont micafungin and 

dapto


Discussed with RN afebrile this morning


Monitor H&H


Trach shield, as tolerated, on room air


Up to chair, pt/ot


Follow culture


Follow surgery input


DVT GI prophylaxis


f/u BC /resp cultures 


Continue TPN for nutrition support 


DVT/GI PPX











SHARYN SOLORZANO MD                 Jul 24, 2020 11:39

## 2020-07-25 VITALS — DIASTOLIC BLOOD PRESSURE: 65 MMHG | SYSTOLIC BLOOD PRESSURE: 113 MMHG

## 2020-07-25 VITALS — SYSTOLIC BLOOD PRESSURE: 136 MMHG | DIASTOLIC BLOOD PRESSURE: 72 MMHG

## 2020-07-25 VITALS — DIASTOLIC BLOOD PRESSURE: 80 MMHG | SYSTOLIC BLOOD PRESSURE: 146 MMHG

## 2020-07-25 VITALS — DIASTOLIC BLOOD PRESSURE: 79 MMHG | SYSTOLIC BLOOD PRESSURE: 122 MMHG

## 2020-07-25 VITALS — SYSTOLIC BLOOD PRESSURE: 141 MMHG | DIASTOLIC BLOOD PRESSURE: 84 MMHG

## 2020-07-25 VITALS — SYSTOLIC BLOOD PRESSURE: 136 MMHG | DIASTOLIC BLOOD PRESSURE: 76 MMHG

## 2020-07-25 LAB
ANION GAP SERPL CALC-SCNC: 7 MMOL/L (ref 6–14)
BUN SERPL-MCNC: 15 MG/DL (ref 7–20)
CALCIUM SERPL-MCNC: 10 MG/DL (ref 8.5–10.1)
CHLORIDE SERPL-SCNC: 103 MMOL/L (ref 98–107)
CO2 SERPL-SCNC: 27 MMOL/L (ref 21–32)
CREAT SERPL-MCNC: 0.6 MG/DL (ref 0.6–1)
GFR SERPLBLD BASED ON 1.73 SQ M-ARVRAT: 106.3 ML/MIN
GLUCOSE SERPL-MCNC: 140 MG/DL (ref 70–99)
PHOSPHATE SERPL-MCNC: 4.2 MG/DL (ref 2.6–4.7)
POTASSIUM SERPL-SCNC: 4.3 MMOL/L (ref 3.5–5.1)
SODIUM SERPL-SCNC: 137 MMOL/L (ref 136–145)

## 2020-07-25 RX ADMIN — ACETAMINOPHEN PRN MG: 650 SUPPOSITORY RECTAL at 19:52

## 2020-07-25 RX ADMIN — INSULIN LISPRO SCH UNITS: 100 INJECTION, SOLUTION INTRAVENOUS; SUBCUTANEOUS at 18:00

## 2020-07-25 RX ADMIN — INSULIN LISPRO SCH UNITS: 100 INJECTION, SOLUTION INTRAVENOUS; SUBCUTANEOUS at 11:36

## 2020-07-25 RX ADMIN — CEFTAZIDIME, AVIBACTAM SCH MLS/HR: 2; .5 POWDER, FOR SOLUTION INTRAVENOUS at 21:40

## 2020-07-25 RX ADMIN — IPRATROPIUM BROMIDE AND ALBUTEROL SULFATE SCH ML: .5; 3 SOLUTION RESPIRATORY (INHALATION) at 03:45

## 2020-07-25 RX ADMIN — ACETYLCYSTEINE SCH MG: 200 INHALANT RESPIRATORY (INHALATION) at 20:16

## 2020-07-25 RX ADMIN — IPRATROPIUM BROMIDE AND ALBUTEROL SULFATE SCH ML: .5; 3 SOLUTION RESPIRATORY (INHALATION) at 20:16

## 2020-07-25 RX ADMIN — ENOXAPARIN SODIUM SCH MG: 40 INJECTION SUBCUTANEOUS at 07:56

## 2020-07-25 RX ADMIN — METOPROLOL TARTRATE PRN MG: 5 INJECTION INTRAVENOUS at 03:08

## 2020-07-25 RX ADMIN — IPRATROPIUM BROMIDE AND ALBUTEROL SULFATE SCH ML: .5; 3 SOLUTION RESPIRATORY (INHALATION) at 11:19

## 2020-07-25 RX ADMIN — PANTOPRAZOLE SODIUM SCH MG: 40 INJECTION, POWDER, FOR SOLUTION INTRAVENOUS at 07:56

## 2020-07-25 RX ADMIN — INSULIN LISPRO SCH UNITS: 100 INJECTION, SOLUTION INTRAVENOUS; SUBCUTANEOUS at 05:49

## 2020-07-25 RX ADMIN — BACITRACIN SCH MLS/HR: 5000 INJECTION, POWDER, FOR SOLUTION INTRAMUSCULAR at 14:33

## 2020-07-25 RX ADMIN — CEFTAZIDIME, AVIBACTAM SCH MLS/HR: 2; .5 POWDER, FOR SOLUTION INTRAVENOUS at 14:16

## 2020-07-25 RX ADMIN — FENTANYL CITRATE PRN MCG: 50 INJECTION INTRAMUSCULAR; INTRAVENOUS at 11:31

## 2020-07-25 RX ADMIN — IPRATROPIUM BROMIDE AND ALBUTEROL SULFATE SCH ML: .5; 3 SOLUTION RESPIRATORY (INHALATION) at 00:00

## 2020-07-25 RX ADMIN — CEFTAZIDIME, AVIBACTAM SCH MLS/HR: 2; .5 POWDER, FOR SOLUTION INTRAVENOUS at 05:35

## 2020-07-25 RX ADMIN — Medication PRN EACH: at 12:28

## 2020-07-25 RX ADMIN — METOPROLOL TARTRATE PRN MG: 5 INJECTION INTRAVENOUS at 09:44

## 2020-07-25 RX ADMIN — ACETYLCYSTEINE SCH MG: 200 INHALANT RESPIRATORY (INHALATION) at 08:51

## 2020-07-25 RX ADMIN — IPRATROPIUM BROMIDE AND ALBUTEROL SULFATE SCH ML: .5; 3 SOLUTION RESPIRATORY (INHALATION) at 08:51

## 2020-07-25 RX ADMIN — FENTANYL SCH PATCH: 12.5 PATCH TRANSDERMAL at 09:09

## 2020-07-25 RX ADMIN — IPRATROPIUM BROMIDE AND ALBUTEROL SULFATE SCH ML: .5; 3 SOLUTION RESPIRATORY (INHALATION) at 15:38

## 2020-07-25 RX ADMIN — DEXTROSE SCH MLS/HR: 50 INJECTION, SOLUTION INTRAVENOUS at 07:58

## 2020-07-25 RX ADMIN — FENTANYL CITRATE PRN MCG: 50 INJECTION INTRAMUSCULAR; INTRAVENOUS at 04:07

## 2020-07-25 NOTE — NUR
Pharmacy TPN Dosing Note



S: JESENIA BEAN is a 49 year old F Currently receiving Central Continuous TPN started 
03/18/20



B:Pertinent PMH: 

Necrotizing pancreatitis

Height: 5 feet, 8 inches

Weight: 82.1 kg



Current diet: NPO 



LABS:

Sodium:    137 

Potassium: 4.3 

Chloride:  103 

Calcium:   10.0 

Corrected Calcium: 12.32 

Magnesium: 1.9 (7/23) 

CO2:       27 

SCr:       0.6 

Glucose:   138-145 

Albumin:   1.1 

AST:       16 

ALT:       15 



TPN FORMULA:

TPN TYPE:  Central Continuous

AMINO ACIDS:         95 gm

DEXTROSE:            275 gm

LIPIDS:              30 gm

SODIUM CHLORIDE:     120 mEq

SODIUM ACETATE:      - mEq

SODIUM PHOSPHATE:    10 mmol

POTASSIUM CHLORIDE:  30 mEq

POTASSIUM ACETATE:   30 mEq

POTASSIUM PHOSPHATE: - mmol

MAGNESIUM:           15 mEq

CALCIUM:             - mEq

INSULIN:             15 units

MULTIPLE VITAMIN:    10 ml

TRACE ELEMENTS:      1 ml ml(s)



TPN PLAN:  

Labs stable. No changes to TPN. Next labs Monday due to stability.





R: Continue TPN AS ABOVE.

Will monitor electrolytes, glucose, and tolerance to TPN.



 CANDACE BELTRE [facility], 07/25/20 1989

## 2020-07-25 NOTE — PDOC
PULMONARY PROGRESS NOTES


Subjective


Patient with no distress


Vitals





Vital Signs








  Date Time  Temp Pulse Resp B/P (MAP) Pulse Ox O2 Delivery O2 Flow Rate FiO2


 


7/25/20 09:44  139  147/73    


 


7/25/20 09:09     97 Room Air 2.0 


 


7/25/20 07:00 97.8       





 97.8       


 


7/25/20 04:40   32     








ROS:  No Nausea, No Chest Pain, No Increase Cough


HEENT:  Other (trach site ok)


Lungs:  Crackles


Cardiovascular:  S1, S2


Abdomen:  Soft, Non-tender, Other (multiple ROBERT drains )


Neuro Exam:  Alert


Extremities:  Other (+1 BLE edema)


Skin:  Warm


Labs





Laboratory Tests








Test


 7/23/20


12:26 7/23/20


18:12 7/24/20


00:02 7/24/20


06:00


 


Glucose (Fingerstick)


 143 mg/dL


(70-99) 126 mg/dL


(70-99) 138 mg/dL


(70-99) 





 


White Blood Count


 


 


 


 16.9 x10^3/uL


(4.0-11.0)


 


Red Blood Count


 


 


 


 2.66 x10^6/uL


(3.50-5.40)


 


Hemoglobin


 


 


 


 7.9 g/dL


(12.0-15.5)


 


Hematocrit


 


 


 


 24.3 %


(36.0-47.0)


 


Mean Corpuscular Volume    91 fL () 


 


Mean Corpuscular Hemoglobin    30 pg (25-35) 


 


Mean Corpuscular Hemoglobin


Concent 


 


 


 32 g/dL


(31-37)


 


Red Cell Distribution Width


 


 


 


 16.7 %


(11.5-14.5)


 


Platelet Count


 


 


 


 569 x10^3/uL


(140-400)


 


Neutrophils (%) (Auto)    83 % (31-73) 


 


Lymphocytes (%) (Auto)    9 % (24-48) 


 


Monocytes (%) (Auto)    6 % (0-9) 


 


Eosinophils (%) (Auto)    2 % (0-3) 


 


Basophils (%) (Auto)    1 % (0-3) 


 


Neutrophils # (Auto)


 


 


 


 14.1 x10^3/uL


(1.8-7.7)


 


Lymphocytes # (Auto)


 


 


 


 1.5 x10^3/uL


(1.0-4.8)


 


Monocytes # (Auto)


 


 


 


 1.0 x10^3/uL


(0.0-1.1)


 


Eosinophils # (Auto)


 


 


 


 0.3 x10^3/uL


(0.0-0.7)


 


Basophils # (Auto)


 


 


 


 0.1 x10^3/uL


(0.0-0.2)


 


Test


 7/24/20


06:21 7/24/20


07:30 7/24/20


12:24 7/24/20


17:23


 


Glucose (Fingerstick)


 150 mg/dL


(70-99) 


 156 mg/dL


(70-99) 123 mg/dL


(70-99)


 


Sodium Level


 


 136 mmol/L


(136-145) 


 





 


Potassium Level


 


 4.2 mmol/L


(3.5-5.1) 


 





 


Chloride Level


 


 103 mmol/L


() 


 





 


Carbon Dioxide Level


 


 26 mmol/L


(21-32) 


 





 


Anion Gap  7 (6-14)   


 


Blood Urea Nitrogen


 


 13 mg/dL


(7-20) 


 





 


Creatinine


 


 0.6 mg/dL


(0.6-1.0) 


 





 


Estimated GFR


(Cockcroft-Gault) 


 106.3 


 


 





 


BUN/Creatinine Ratio  22 (6-20)   


 


Glucose Level


 


 125 mg/dL


(70-99) 


 





 


Calcium Level


 


 9.4 mg/dL


(8.5-10.1) 


 





 


Total Bilirubin


 


 0.5 mg/dL


(0.2-1.0) 


 





 


Aspartate Amino Transf


(AST/SGOT) 


 16 U/L (15-37) 


 


 





 


Alanine Aminotransferase


(ALT/SGPT) 


 15 U/L (14-59) 


 


 





 


Alkaline Phosphatase


 


 129 U/L


() 


 





 


Total Protein


 


 5.1 g/dL


(6.4-8.2) 


 





 


Albumin


 


 1.1 g/dL


(3.4-5.0) 


 





 


Albumin/Globulin Ratio  0.3 (1.0-1.7)   


 


Test


 7/24/20


23:05 7/25/20


04:45 7/25/20


05:48 





 


Glucose (Fingerstick)


 138 mg/dL


(70-99) 


 138 mg/dL


(70-99) 





 


Sodium Level


 


 137 mmol/L


(136-145) 


 





 


Potassium Level


 


 4.3 mmol/L


(3.5-5.1) 


 





 


Chloride Level


 


 103 mmol/L


() 


 





 


Carbon Dioxide Level


 


 27 mmol/L


(21-32) 


 





 


Anion Gap  7 (6-14)   


 


Blood Urea Nitrogen


 


 15 mg/dL


(7-20) 


 





 


Creatinine


 


 0.6 mg/dL


(0.6-1.0) 


 





 


Estimated GFR


(Cockcroft-Gault) 


 106.3 


 


 





 


Glucose Level


 


 140 mg/dL


(70-99) 


 





 


Calcium Level


 


 10.0 mg/dL


(8.5-10.1) 


 





 


Phosphorus Level


 


 4.2 mg/dL


(2.6-4.7) 


 











Laboratory Tests








Test


 7/24/20


12:24 7/24/20


17:23 7/24/20


23:05 7/25/20


04:45


 


Glucose (Fingerstick)


 156 mg/dL


(70-99) 123 mg/dL


(70-99) 138 mg/dL


(70-99) 





 


Sodium Level


 


 


 


 137 mmol/L


(136-145)


 


Potassium Level


 


 


 


 4.3 mmol/L


(3.5-5.1)


 


Chloride Level


 


 


 


 103 mmol/L


()


 


Carbon Dioxide Level


 


 


 


 27 mmol/L


(21-32)


 


Anion Gap    7 (6-14) 


 


Blood Urea Nitrogen


 


 


 


 15 mg/dL


(7-20)


 


Creatinine


 


 


 


 0.6 mg/dL


(0.6-1.0)


 


Estimated GFR


(Cockcroft-Gault) 


 


 


 106.3 





 


Glucose Level


 


 


 


 140 mg/dL


(70-99)


 


Calcium Level


 


 


 


 10.0 mg/dL


(8.5-10.1)


 


Phosphorus Level


 


 


 


 4.2 mg/dL


(2.6-4.7)


 


Test


 7/25/20


05:48 


 


 





 


Glucose (Fingerstick)


 138 mg/dL


(70-99) 


 


 











Medications





Active Scripts








 Medications  Dose


 Route/Sig


 Max Daily Dose Days Date Category


 


 Bisoprolol


 Fumarate 5 Mg


 Tablet  10 Mg


 PO DAILY


   3/16/20 Reported








Comments








ct reviewed 7/12/20, Decreased left-sided effusion after catheter placement. The




right-sided effusion has increased as has atelectasis.


 





 


There has been exchange or placement of multiple drainage 


tubes and a gastrojejunostomy tube. Both collections are smaller. No 


significant new abdominal fluid collection is seen. The jejunal component 


of the gastrojejunostomy tube appears to be looped in the proximal small 


bowel. 











ct abdomen /pelvis 6/6


1. Removal of the percutaneous pigtail drainage catheters since the prior 


exam. Sequela of pancreatitis with extensive pseudocysts again 


demonstrated, the right-sided collections are slightly larger since the 


prior exam, the left-sided collections are stable. See above.


2. Moderate to large left pleural effusion with atelectasis and collapse 


of most of the left lower lobe, stable. Small right pleural effusion is 


stable.


3. Gallstone.





ct chest 6/15 reviewed








 GRAM NEG COCCOBACILLI:MANY


        SQUAMOUS EPI CELL:RARE


        PMN (WBCs):[id number]


        Unless otherwise specified, Testing Performed by:


        [facility]


        [address] [place] [address]


        For Inquires, the Physician may contact the Microbiology


        department at [phone]





  RESPIRATORY CULTURE  Final  


        Final





        MANY GRAM NEGATIVE RODS on 06/15/20 at 1107


        FINAL ID= [PSEUDOMONAS AERUGINOSA]


        MICRO CHARGES


        PSEUDOMONAS AERUGINOSA





  ANTIMICROBIAL SUSCEPTIBILITY  Final  


        Comment





        NEG ANSON 56


        PSEUDOMONAS AERUGINOSA


        ANTIBIOTIC                        RESULT          INTERPRETATION


        AMIKACIN                          <=16                  S


        AZTREONAM                         <=4                   S


        CEFTAZIDIME                       <=1                   S


        CIPROFLOXACIN                     <=0.25                S


        CEFEPIME                          <=2                   S


        CEFTAZIDIME/AVIBACTAM             <=4                   S


        GENTAMICIN                        <=2                   S


        LEVOFLOXACIN                      <=0.5                 S





Impression


.





IMPRESSION:


1.  Acute hypoxemic respiratory failure secondary to ARDS status post trach, 

developed anemia 6/7, blood drainage from RLQ abdomen drain site, and 

surrounding firmness  / developed septic shock 6/7 from abdomen source, required

levo 6/7


s/p 3 new drains 6/7 with brown color drainage,  


S/P Exp. 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 

parapsilosis & PSAE (I-merrem/ceftazidime/AZT/cefepime))


Leucocytosis -trending upward 


Fever 


Acute gallstone pancreatitis with persistent necrosis


  - 4/9.  CT A/P Increased ascites. Persistent evidence of necrotizing 

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


  - 4/27. status post ROBERT drain placement; C. parapsilosis. s/p drain 5/6 + yeast

& high amylase; s/p additional drain on 5/8. Drains removed. 


  -5/6. fluid  candida parapsilosis fluid, amylase high


  - 6/6 showed multiple pseudocysts, slight larger on the right. s/p drains x 3,

6/7.  + PSAE (MDRO-R Cefepime, Zosyn ANSON < 64) and yeast, 


  -6/7 s/p drain replacement x 3; fluid cult PSAE (MDRO), yeast; treated


  -7/12 CT A/P shows smaller fluid collections.  


  -722 CT abdomen and pelvis drains in place       


Ascites s/p paracentesis 4/15 & 5/6. C. parapsilosis 


Cholelithiasis with thickening of the gallbladder wall.


JED, Hyperkalemia, Metabolic acidosis off dialysis


Acute hypoxic resp failure. trach/vent. sputum 6/13  + PSAE (I merrem) ; sputum 

culture July 19+ for PSAE R Merrem, sensitive to cefepime


Pleural effusion status post CTS left side


 Abdominal fluid culture 6 /7 MDRO Pseudomonas, yeast


Sputum culture positive 7/19 for MDRO Pseudomonas


Chest tube fluid positive for 7/21 Candida Parapsilosis


S/P Exploratory laparotomy, lysis of adhesions, subtotal cholecystectomy with 

cholangiogram, gastrojejunostomy tube placement, pancreatic necrosectomy


leukocytosis- improving 





7/23 fluid from the chest tube


  GRAM STAIN  Final  


        Final





        NO ORGANISMS SEEN.


        SQUAMOUS EPI CELL:NOT APPLICABLE


        PMN (WBCs):[id number]


        Unless otherwise specified, Testing Performed by:


        [address] [address]


        For Inquires, the Physician may contact the Microbiology


        department at [phone]





  ANAEROBIC-AEROBIC CULTURE  Preliminary  


        Preliminary





        No Growth on 07/22/20 at 0957


        Unless otherwise specified, Testing Performed by:


        [address] [address]


        For Inquires, the Physician may contact the Microbiology


        department at [phone]











Antibiotics per ID, since 7/23


Restart Avycaz 


DC cefepime


cont micafungin and dapto





Plan


.


no sig drainage on ct left side, no sig effusion


chest tube REMOVED 7/24


Transfuse as needed


Antibiotics per ID changes made,Change Avycaz to cefepime cont micafungin and 

dapto


Discussed with RN afebrile this morning


Monitor H&H


Trach shield, as tolerated, on room air


Up to chair, pt/ot


Follow culture


Follow surgery input


DVT GI prophylaxis


f/u BC /resp cultures 


Continue TPN for nutrition support 


DVT/GI PPX











SHARYN SOLORZANO MD                 Jul 25, 2020 10:31

## 2020-07-25 NOTE — PDOC
Infectious Disease Note


Subjective:


Subjective


Pt resting quietly


On 2 L O2 by nasal cannula


T max 100


TPN





Vital Signs:


Vital Signs





Vital Signs








  Date Time  Temp Pulse Resp B/P (MAP) Pulse Ox O2 Delivery O2 Flow Rate FiO2


 


7/25/20 04:40   32  98 Nasal Cannula 2.0 


 


7/25/20 03:08  138  146/80    


 


7/25/20 02:39 98.1       





 98.1       











Physical Exam:


PHYSICAL EXAM


GENERAL: pt in bed, appears weak


HEENT: Pupils equal, oral cavity dry. NGT out


NECK:  Tracheostomy 


LUNGS: Diminished aeration bases,  CT on left 


HEART:  S1, S2,  tachy 110s


ABDOMEN: Distended, bowel sounds hypoactive, soft, richardson x 2, ROBERT drains, G-J 

tube


: [place] in place 


EXTREMITIES: Trace generalized edema, no cyanosis. MARTHA hose bilaterally, 


SKIN: warm touch. No signs of rash.  


LUE-PICC without signs of complications





Medications:


Inpatient Meds:





Current Medications








 Medications


  (Trade)  Dose


 Ordered  Sig/Yee  Start Time


 Stop Time Status Last Admin


Dose Admin


 


 Acetaminophen


  (Tylenol Supp)  650 mg  PRN Q6HRS  PRN  3/24/20 10:30


    7/22/20 20:53


650 MG


 


 Acetaminophen


  (Tylenol)  650 mg  PRN Q6HRS  PRN  3/21/20 03:36


 5/13/20 10:25 DC 4/16/20 19:56


650 MG


 


 Acetaminophen/


 Hydrocodone Bitart


  (Lortab 5/325)  1 tab  PRN Q4HRS  PRN  7/23/20 16:00


    7/24/20 19:34


1 TAB


 


 Acetylcysteine


  (Mucomyst 20%


 Resp Treatment)  600 mg  RTBID  6/27/20 12:00


    7/24/20 20:33


600 MG


 


 Albumin Human  500 ml @ 


 125 mls/hr  PRN Q1HR  PRN  6/30/20 15:45


     





 


 Albuterol Sulfate


  (Ventolin Neb


 Soln)  2.5 mg  1X  ONCE  3/17/20 22:30


 3/17/20 22:31 DC 3/18/20 00:56


2.5 MG


 


 Albuterol/


 Ipratropium


  (Duoneb)  3 ml  Q4HRS  6/13/20 08:00


    7/25/20 03:45


3 ML


 


 Alprazolam


  (Xanax)  0.5 mg  PRN QID  PRN  7/23/20 16:00


    7/25/20 00:57


0.5 MG


 


 Alteplase,


 Recombinant


  (Cathflo For


 Central Catheter


 Clearance)  1 mg  1X  ONCE  7/21/20 12:00


 7/21/20 12:01 DC 7/21/20 12:17


1 MG


 


 Alteplase,


 Recombinant 4 mg/


 Sodium Chloride  20 ml @ 20


 mls/hr  1X  ONCE  6/17/20 10:00


 6/17/20 10:59 DC 6/17/20 10:09


20 MLS/HR


 


 Amino Acids/


 Glycerin/


 Electrolytes  1,000 ml @ 


 75 mls/hr  [id number]  4/20/20 21:15


   UNV  





 


 Artificial Tears


  (Artificial


 Tears)  1 drop  PRN Q15MIN  PRN  4/29/20 05:30


    6/23/20 21:17


1 DROP


 


 Atenolol


  (Tenormin)  100 mg  DAILY  3/17/20 09:00


 3/16/20 20:08 DC  





 


 Atropine Sulfate


  (ATROPINE 0.5mg


 SYRINGE)  0.5 mg  PRN Q5MIN  PRN  4/2/20 08:15


     





 


 Barium Sulfate


  (Varibar Thin


 Liquid Apple)  148 gm  1X  ONCE  5/26/20 11:45


 5/26/20 11:49 DC  





 


 Benzocaine


  (Hurricaine One)  1 spray  1X  ONCE  3/20/20 14:30


 3/20/20 14:31 DC 3/20/20 16:38


1 SPRAY


 


 Bisacodyl


  (Dulcolax Supp)  10 mg  STK-MED ONCE  4/27/20 10:59


 4/27/20 10:59 DC  





 


 Bumetanide


  (Bumex)  2 mg  DAILY  5/8/20 10:00


 5/18/20 17:15 DC 5/18/20 08:07


2 MG


 


 Bupivacaine HCl/


 Epinephrine Bitart


  (Sensorcain-Epi


 0.5%-1:401000 Mpf)  30 ml  STK-MED ONCE  6/30/20 08:34


 6/30/20 08:35 DC  





 


 Calcium Carbonate/


 Glycine


  (Tums)  500 mg  PRN [id number]  PRN  3/18/20 17:45


 5/13/20 10:25 DC  





 


 Calcium Chloride


 1000 mg/Sodium


 Chloride  110 ml @ 


 220 mls/hr  1X  ONCE  3/17/20 22:30


 3/17/20 22:59 DC 3/17/20 22:11


220 MLS/HR


 


 Calcium Chloride


 3000 mg/Sodium


 Chloride  1,030 ml @ 


 50 mls/hr  [id number]  3/19/20 08:00


 3/21/20 15:23 DC 3/21/20 02:17


50 MLS/HR


 


 Calcium Gluconate


  (Calcium


 Gluconate)  2,000 mg  1X  ONCE  3/19/20 02:15


 3/19/20 02:16 DC 3/19/20 02:19


2,000 MG


 


 Calcium Gluconate


 1000 mg/Sodium


 Chloride  110 ml @ 


 220 mls/hr  1X  ONCE  3/18/20 03:30


 3/18/20 03:59 DC 3/18/20 03:21


220 MLS/HR


 


 Calcium Gluconate


 2000 mg/Sodium


 Chloride  120 ml @ 


 220 mls/hr  1X  ONCE  3/18/20 07:30


 3/18/20 08:02 DC 3/18/20 09:05


220 MLS/HR


 


 Cefepime HCl


  (Maxipime)  2 gm  Q12HR  7/22/20 09:00


 7/23/20 07:30 DC 7/22/20 20:53


2 GM


 


 Ceftazidime/


 Avibactam 2.5 gm/


 Sodium Chloride  250 ml @ 


 125 mls/hr  Q8HRS  7/23/20 08:00


    7/25/20 05:35


125 MLS/HR


 


 Cellulose


  (Surgicel


 Fibrillar 1x2)  1 each  STK-MED ONCE  4/6/20 11:00


 4/6/20 11:01 DC  





 


 Cellulose


  (Surgicel


 Hemostat 2x14)  1 each  STK-MED ONCE  4/27/20 10:58


 4/27/20 10:59 DC  





 


 Cellulose


  (Surgicel


 Hemostat 4x8)  1 each  STK-MED ONCE  4/27/20 10:58


 4/27/20 10:59 DC  





 


 Chlorhexidine


 Gluconate


  (Peridex)  15 ml  BID  6/13/20 09:00


 6/13/20 07:58 DC  





 


 Ciprofloxacin/


 Dextrose  200 ml @ 


 200 mls/hr  Q12HR  7/12/20 10:00


 7/21/20 08:20 DC 7/20/20 21:02


200 MLS/HR


 


 Cyclobenzaprine


 HCl


  (Flexeril)  10 mg  PRN Q6HRS  PRN  4/30/20 10:45


    7/10/20 19:12


10 MG


 


 Daptomycin 410 mg/


 Sodium Chloride  50 ml @ 


 100 mls/hr  Q24H  6/7/20 14:00


 6/10/20 08:30 DC 6/9/20 13:33


100 MLS/HR


 


 Daptomycin 430 mg/


 Sodium Chloride  50 ml @ 


 100 mls/hr  Q24H  4/25/20 13:00


 4/30/20 20:58 DC 4/30/20 13:00


100 MLS/HR


 


 Daptomycin 450 mg/


 Sodium Chloride  50 ml @ 


 100 mls/hr  Q24H  5/17/20 09:00


 5/21/20 08:30 DC 5/20/20 09:25


100 MLS/HR


 


 Daptomycin 485 mg/


 Sodium Chloride  50 ml @ 


 100 mls/hr  Q24H  5/4/20 11:00


 5/12/20 07:44 DC 5/11/20 13:10


100 MLS/HR


 


 Daptomycin 500 mg/


 Sodium Chloride  50 ml @ 


 100 mls/hr  Q24H  7/15/20 09:00


 7/24/20 08:38 DC 7/24/20 08:25


100 MLS/HR


 


 Desflurane


  (Suprane)  90 ml  STK-MED ONCE  6/30/20 10:18


 6/30/20 10:19 DC  





 


 Dexamethasone


 Sodium Phosphate


  (Decadron)  4 mg  STK-MED ONCE  4/27/20 10:56


 4/27/20 10:57 DC  





 


 Dexmedetomidine


 HCl 400 mcg/


 Sodium Chloride  100 ml @ 0


 mls/hr  CONT  PRN  4/2/20 08:15


 5/30/20 18:31 DC 5/30/20 12:57


8 MLS/HR


 


 Dextrose


  (Dextrose


 50%-Water Syringe)  12.5 gm  PRN Q15MIN  PRN  3/16/20 09:30


     





 


 Digoxin


  (Lanoxin)  125 mcg  1X  ONCE  3/19/20 18:00


 3/19/20 18:01 DC 3/19/20 17:10


125 MCG


 


 Diphenhydramine


 HCl


  (Benadryl)  25 mg  1X  ONCE  7/16/20 19:00


 7/16/20 19:01 DC 7/16/20 18:56


25 MG


 


 Duloxetine HCl


  (Cymbalta)  30 mg  DAILY  5/10/20 14:00


 5/13/20 10:25 DC 5/11/20 09:48


30 MG


 


 Enoxaparin Sodium


  (Lovenox 100mg


 Syringe)  100 mg  Q12HR  4/21/20 21:00


   UNV  





 


 Enoxaparin Sodium


  (Lovenox 40mg


 Syringe)  40 mg  Q24H  7/1/20 08:00


    7/24/20 08:25


40 MG


 


 Ephedrine Sulfate


  (ePHEDrine PF IN


 SALINE SYRINGE)  50 mg  STK-MED ONCE  6/30/20 14:45


 6/30/20 14:45 DC  





 


 Etomidate


  (Amidate)  8 mg  1X  ONCE  3/23/20 08:30


 3/23/20 08:31 DC 3/23/20 08:33


8 MG


 


 Fentanyl


  (Duragesic 12mcg/


 Hr Patch)  1 patch  Q3DAYS  7/10/20 09:00


    7/22/20 09:00


1 PATCH


 


 Fentanyl


  (Duragesic 50mcg/


 Hr Patch)  1 patch  Q72H  6/4/20 21:00


 6/13/20 12:00 DC 6/4/20 21:22


1 PATCH


 


 Fentanyl Citrate  30 ml @ 0


 mls/hr  CONT  PRN  7/9/20 17:30


     





 


 Fentanyl Citrate


  (Fentanyl 2ml


 Vial)  100 mcg  STK-MED ONCE  6/30/20 07:44


 6/30/20 07:44 DC  





 


 Fentanyl Citrate


  (Fentanyl 5ml


 Vial)  250 mcg  1X  ONCE  5/8/20 09:15


 5/8/20 09:16 DC 5/8/20 09:30


50 MCG


 


 Flumazenil


  (Romazicon)  0.5 mg  STK-MED ONCE  6/7/20 14:48


 6/7/20 14:48 DC  





 


 Fluoxetine HCl


  (PROzac)  20 mg  QHS  6/4/20 21:00


    7/24/20 21:24


20 MG


 


 Furosemide


  (Lasix)  40 mg  1X  ONCE  6/24/20 15:30


 6/24/20 15:33 DC 6/24/20 16:27


40 MG


 


 Haloperidol


 Lactate


  (Haldol Inj)  3 mg  1X  ONCE  5/4/20 14:30


 5/4/20 14:31 DC 5/4/20 14:37


3 MG


 


 Heparin Sodium


  (Porcine)


  (Hep Lock Adult)  500 unit  STK-MED ONCE  4/7/20 09:29


 4/7/20 09:30 DC  





 


 Heparin Sodium


  (Porcine)


  (Heparin Sodium)  5,000 unit  Q12HR  4/27/20 21:00


 5/7/20 09:59 DC 5/6/20 20:57


5,000 UNIT


 


 Heparin Sodium


  (Porcine) 1000


 unit/Sodium


 Chloride  1,001 ml @ 


 1,001 mls/hr  1X  ONCE  6/30/20 06:00


 6/30/20 06:59 DC  





 


 Hydromorphone HCl


  (Dilaudid


 Standard PCA)  12 mg  STK-MED ONCE  5/1/20 15:50


 5/12/20 11:24 DC  





 


 Hydromorphone HCl


  (Dilaudid)  1 mg  PRN Q4HRS  PRN  5/4/20 19:00


 5/18/20 17:10 DC 5/18/20 06:25


1 MG


 


 Info


  (CONTRAST GIVEN


 -- Rx MONITORING)  1 each  PRN DAILY  PRN  7/21/20 11:45


 7/23/20 11:44 DC  





 


 Info


  (Icu Electrolyte


 Protocol)  1 ea  CONT PRN  PRN  3/29/20 13:15


     





 


 Info


  (PHARMACY


 MONITORING -- do


 not chart)  1 each  PRN DAILY  PRN  4/24/20 15:45


 5/26/20 14:14 DC  





 


 Info


  (Tpn Per


 Pharmacy)  1 each  PRN DAILY  PRN  3/18/20 12:30


   UNV  





 


 Insulin Human


 Lispro


  (HumaLOG)  0-9 UNITS  Q6HRS  3/16/20 09:30


    7/24/20 12:26


4 UNITS


 


 Insulin Human


 Regular


  (HumuLIN R VIAL)  5 unit  1X  ONCE  3/17/20 22:30


 3/17/20 22:31 DC 3/17/20 22:14


5 UNIT


 


 Iohexol


  (Omnipaque 240


 Mg/ml)  30 ml  1X  ONCE  3/30/20 11:30


 3/30/20 11:33 DC 3/30/20 11:30


30 ML


 


 Iohexol


  (Omnipaque 300


 Mg/ml)  75 ml  1X  ONCE  7/21/20 11:30


 7/21/20 11:31 DC 7/21/20 11:30


75 ML


 


 Iohexol


  (Omnipaque 350


 Mg/ml)  90 ml  1X  ONCE  3/16/20 03:30


 3/16/20 03:31 DC 3/16/20 03:25


90 ML


 


 Ketorolac


 Tromethamine


  (Toradol 30mg


 Vial)  30 mg  1X  ONCE  3/16/20 03:00


 3/16/20 03:01 DC 3/16/20 02:54


30 MG


 


 Lidocaine HCl


  (Buffered


 Lidocaine 1%)  3 ml  1X  ONCE  6/15/20 13:00


 6/15/20 13:01 DC 6/15/20 13:11


12 ML


 


 Lidocaine HCl


  (Glydo


  (Lidocaine) Jelly)  1 thomas  1X  ONCE  3/20/20 14:30


 3/20/20 14:31 DC 3/20/20 16:38


1 THOMAS


 


 Lidocaine HCl


  (Lidocaine 1%


 20ml Vial)  20 ml  1X  ONCE  6/7/20 15:00


 6/7/20 15:01 DC 6/7/20 15:30


20 ML


 


 Lidocaine HCl


  (Lidocaine Pf 2%


 Vial)  5 ml  STK-MED ONCE  6/30/20 07:44


 6/30/20 07:44 DC  





 


 Lidocaine HCl


  (Xylocaine-Mpf


 1% 2ml Vial)  2 ml  PRN 1X  PRN  4/27/20 07:00


 4/28/20 06:59 DC  





 


 Linezolid/Dextrose  300 ml @ 


 300 mls/hr  Q12HR  5/17/20 09:00


 5/20/20 08:11 DC 5/19/20 21:08


300 MLS/HR


 


 Lorazepam


  (Ativan Inj)  0.25 mg  PRN Q4HRS  PRN  6/3/20 07:30


    7/20/20 03:28


0.25 MG


 


 Magnesium Sulfate  50 ml @ 25


 mls/hr  1X  ONCE  6/30/20 16:30


 6/30/20 18:29 DC 6/30/20 17:02


25 MLS/HR


 


 Meropenem 1 gm/


 Sodium Chloride  100 ml @ 


 200 mls/hr  Q12HR  6/7/20 21:00


 6/25/20 08:56 DC 6/25/20 08:27


200 MLS/HR


 


 Meropenem 500 mg/


 Sodium Chloride  50 ml @ 


 100 mls/hr  Q6HRS  6/28/20 18:00


 7/21/20 08:23 DC 7/21/20 06:15


100 MLS/HR


 


 Methylprednisolone


 Sodium Succinate


  (SOLU-Medrol


 125MG VIAL)  125 mg  1X  ONCE  6/13/20 06:15


 6/13/20 06:16 DC 6/13/20 06:26


125 MG


 


 Metoclopramide HCl


  (Reglan Vial)  10 mg  PRN Q3HRS  PRN  5/9/20 16:45


    5/14/20 04:25


10 MG


 


 Metoprolol


 Tartrate


  (Lopressor Vial)  5 mg  PRN Q6HRS  PRN  6/10/20 09:00


    7/25/20 03:08


5 MG


 


 Metronidazole  100 ml @ 


 100 mls/hr  Q8HRS  4/14/20 10:00


 4/21/20 08:10 DC 4/21/20 06:04


100 MLS/HR


 


 Micafungin Sodium


 100 mg/Dextrose  100 ml @ 


 100 mls/hr  Q24H  6/30/20 08:30


    7/24/20 08:24


100 MLS/HR


 


 Midazolam HCl


  (Versed)  2 mg  1X  ONCE  6/7/20 15:00


 6/7/20 15:01 DC 6/7/20 15:28


1 MG


 


 Midazolam HCl 100


 mg/Sodium Chloride  100 ml @ 1


 mls/hr  CONT  PRN  6/30/20 14:45


    7/3/20 18:48


10 MLS/HR


 


 Midazolam HCl 50


 mg/Sodium Chloride  50 ml @ 0


 mls/hr  CONT  PRN  3/23/20 08:15


 3/28/20 15:59 DC 3/26/20 22:39


7 MLS/HR


 


 Morphine Sulfate


  (Morphine


 Sulfate)  1 mg  PRN Q1HR  PRN  6/30/20 14:45


     





 


 Multi-Ingred


 Cream/Lotion/Oil/


 Oint


  (Artificial


 Tears Eye


 Ointment)  1 thomas  PRN Q1HR  PRN  3/25/20 17:30


 6/3/20 14:39 DC 4/13/20 08:19


1 THOMAS


 


 Naloxone HCl


  (Narcan)  0.4 mg  PRN Q2MIN  PRN  6/30/20 14:45


     





 


 Norepinephrine


 Bitartrate 8 mg/


 Dextrose  258 ml @ 


 13.332 mls/


 hr  CONT  PRN  6/7/20 06:30


    7/2/20 09:09


1.6 MLS/HR


 


 Ondansetron HCl


  (Zofran)  4 mg  STK-MED ONCE  6/30/20 13:33


 6/30/20 13:33 DC  





 


 Pantoprazole


 Sodium


  (PROTONIX VIAL


 for IV PUSH)  40 mg  DAILYAC  3/16/20 11:30


    7/24/20 07:26


40 MG


 


 Phenylephrine HCl


  (Ken-Synephrine


 Inj)  10 mg  STK-MED ONCE  6/30/20 13:33


 6/30/20 13:33 DC  





 


 Phenylephrine HCl


  (PHENYLEPHRINE


 in 0.9% NACL PF)  1 mg  STK-MED ONCE  6/30/20 14:44


 6/30/20 14:45 DC  





 


 Piperacillin Sod/


 Tazobactam Sod


 3.375 gm/Sodium


 Chloride  50 ml @ 


 100 mls/hr  Q6HRS  5/27/20 12:00


 6/4/20 07:26 DC 6/4/20 06:10


100 MLS/HR


 


 Piperacillin Sod/


 Tazobactam Sod


 4.5 gm/Sodium


 Chloride  100 ml @ 


 200 mls/hr  1X  ONCE  3/16/20 06:00


 3/16/20 06:29 DC 3/16/20 05:44


200 MLS/HR


 


 Potassium


 Chloride 110 meq/


 Magnesium Sulfate


 20 meq/


 Multivitamins 10


 ml/Chromium/


 Copper/Manganese/


 Seleni/Zn 1 ml/


 Insulin Human


 Regular 15 unit/


 Total Parenteral


 Nutrition/Amino


 Acids/Dextrose/


 Fat Emulsion


 Intravenous  1,800 ml @ 


 75 mls/hr  TPN  CONT  5/24/20 22:00


 5/25/20 21:59 DC 5/24/20 22:48


75 MLS/HR


 


 Potassium


 Chloride 15 meq/


 Bicarbonate


 Dialysis Soln w/


 out KCl  5,007.5 ml


  @ 1,000 mls/


 hr  Q5H1M  3/29/20 20:00


 4/2/20 13:08 DC 4/1/20 18:14


1,000 MLS/HR


 


 Potassium


 Chloride 20 meq/


 Bicarbonate


 Dialysis Soln w/


 out KCl  5,010 ml @ 


 1,000 mls/hr  Q5H1M  3/25/20 16:00


 3/29/20 19:59 DC 3/29/20 14:54


1,000 MLS/HR


 


 Potassium


 Chloride 40 meq/


 Potassium Acetate


 60 meq/Magnesium


 Sulfate 10 meq/


 Multivitamins 10


 ml/Chromium/


 Copper/Manganese/


 Seleni/Zn 1 ml/


 Insulin Human


 Regular 20 unit/


 Total Parenteral


 Nutrition/Amino


 Acids/Dextrose/


 Fat Emulsion


 Intravenous  1,800 ml @ 


 75 mls/hr  TPN  CONT  6/4/20 22:00


 6/5/20 21:59 DC 6/5/20 00:03


75 MLS/HR


 


 Potassium


 Chloride 70 meq/


 Magnesium Sulfate


 20 meq/


 Multivitamins 10


 ml/Chromium/


 Copper/Manganese/


 Seleni/Zn 1 ml/


 Insulin Human


 Regular 15 unit/


 Total Parenteral


 Nutrition/Amino


 Acids/Dextrose/


 Fat Emulsion


 Intravenous  1,800 ml @ 


 75 mls/hr  TPN  CONT  5/29/20 22:00


 5/30/20 21:59 DC 5/29/20 23:13


75 MLS/HR


 


 Potassium


 Chloride 75 meq/


 Magnesium Sulfate


 15 meq/


 Multivitamins 10


 ml/Chromium/


 Copper/Manganese/


 Seleni/Zn 0.5 ml/


 Insulin Human


 Regular 15 unit/


 Total Parenteral


 Nutrition/Amino


 Acids/Dextrose/


 Fat Emulsion


 Intravenous  1,920 ml @ 


 80 mls/hr  TPN  CONT  5/9/20 22:00


 5/10/20 21:59 DC 5/9/20 22:41


80 MLS/HR


 


 Potassium


 Chloride 75 meq/


 Magnesium Sulfate


 15 meq/Calcium


 Gluconate 8 meq/


 Multivitamins 10


 ml/Chromium/


 Copper/Manganese/


 Seleni/Zn 0.5 ml/


 Insulin Human


 Regular 15 unit/


 Total Parenteral


 Nutrition/Amino


 Acids/Dextrose/


 Fat Emulsion


 Intravenous  1,920 ml @ 


 80 mls/hr  TPN  CONT  5/7/20 22:00


 5/8/20 21:59 DC 5/7/20 22:28


80 MLS/HR


 


 Potassium


 Chloride 75 meq/


 Magnesium Sulfate


 15 meq/Calcium


 Gluconate 8 meq/


 Multivitamins 10


 ml/Chromium/


 Copper/Manganese/


 Seleni/Zn 0.5 ml/


 Insulin Human


 Regular 20 unit/


 Total Parenteral


 Nutrition/Amino


 Acids/Dextrose/


 Fat Emulsion


 Intravenous  1,920 ml @ 


 80 mls/hr  TPN  CONT  5/6/20 22:00


 5/7/20 21:59 DC 5/6/20 22:00


80 MLS/HR


 


 Potassium


 Chloride 75 meq/


 Magnesium Sulfate


 15 meq/Calcium


 Gluconate 8 meq/


 Multivitamins 10


 ml/Chromium/


 Copper/Manganese/


 Seleni/Zn 0.5 ml/


 Insulin Human


 Regular 25 unit/


 Total Parenteral


 Nutrition/Amino


 Acids/Dextrose/


 Fat Emulsion


 Intravenous  1,920 ml @ 


 80 mls/hr  TPN  CONT  5/4/20 22:00


 5/5/20 21:59 DC 5/4/20 23:08


80 MLS/HR


 


 Potassium


 Chloride 75 meq/


 Magnesium Sulfate


 20 meq/Calcium


 Gluconate 10 meq/


 Multivitamins 10


 ml/Chromium/


 Copper/Manganese/


 Seleni/Zn 0.5 ml/


 Insulin Human


 Regular 25 unit/


 Total Parenteral


 Nutrition/Amino


 Acids/Dextrose/


 Fat Emulsion


 Intravenous  1,920 ml @ 


 80 mls/hr  TPN  CONT  5/3/20 22:00


 5/4/20 21:59 DC 5/3/20 22:04


80 MLS/HR


 


 Potassium


 Chloride 75 meq/


 Magnesium Sulfate


 20 meq/Calcium


 Gluconate 10 meq/


 Multivitamins 10


 ml/Chromium/


 Copper/Manganese/


 Seleni/Zn 0.5 ml/


 Insulin Human


 Regular 30 unit/


 Total Parenteral


 Nutrition/Amino


 Acids/Dextrose/


 Fat Emulsion


 Intravenous  1,920 ml @ 


 80 mls/hr  TPN  CONT  5/2/20 22:00


 5/3/20 22:00 DC 5/2/20 21:51


80 MLS/HR


 


 Potassium


 Chloride 80 meq/


 Magnesium Sulfate


 20 meq/


 Multivitamins 10


 ml/Chromium/


 Copper/Manganese/


 Seleni/Zn 0.5 ml/


 Insulin Human


 Regular 15 unit/


 Total Parenteral


 Nutrition/Amino


 Acids/Dextrose/


 Fat Emulsion


 Intravenous  1,920 ml @ 


 80 mls/hr  TPN  CONT  5/12/20 22:00


 5/13/20 21:59 DC 5/12/20 21:40


80 MLS/HR


 


 Potassium


 Chloride 80 meq/


 Magnesium Sulfate


 20 meq/


 Multivitamins 10


 ml/Chromium/


 Copper/Manganese/


 Seleni/Zn 1 ml/


 Insulin Human


 Regular 15 unit/


 Total Parenteral


 Nutrition/Amino


 Acids/Dextrose/


 Fat Emulsion


 Intravenous  1,800 ml @ 


 75 mls/hr  TPN  CONT  5/31/20 22:00


 6/1/20 21:59 DC 5/31/20 21:54


75 MLS/HR


 


 Potassium


 Chloride 90 meq/


 Magnesium Sulfate


 20 meq/


 Multivitamins 10


 ml/Chromium/


 Copper/Manganese/


 Seleni/Zn 1 ml/


 Insulin Human


 Regular 15 unit/


 Total Parenteral


 Nutrition/Amino


 Acids/Dextrose/


 Fat Emulsion


 Intravenous  1,800 ml @ 


 75 mls/hr  TPN  CONT  5/20/20 22:00


 5/21/20 21:59 DC 5/20/20 22:28


75 MLS/HR


 


 Potassium


 Chloride 90 meq/


 Magnesium Sulfate


 20 meq/


 Multivitamins 10


 ml/Chromium/


 Copper/Manganese/


 Seleni/Zn 1 ml/


 Insulin Human


 Regular 20 unit/


 Total Parenteral


 Nutrition/Amino


 Acids/Dextrose/


 Fat Emulsion


 Intravenous  1,800 ml @ 


 75 mls/hr  TPN  CONT  6/3/20 22:00


 6/4/20 21:59 DC 6/3/20 23:13


75 MLS/HR


 


 Potassium


 Chloride/Water  100 ml @ 


 100 mls/hr  Q1H  6/17/20 08:00


 6/17/20 09:59 DC 6/17/20 09:12


100 MLS/HR


 


 Potassium


 Phosphate 20 mmol/


 Sodium Chloride  106.6667


 ml @ 


 51.667 m...  1X  ONCE  3/25/20 13:00


 3/25/20 15:03 DC 3/25/20 12:51


51.667 MLS/HR


 


 Potassium Acetate


 30 meq/Magnesium


 Sulfate 14 meq/


 Multivitamins 10


 ml/Chromium/


 Copper/Manganese/


 Seleni/Zn 1 ml/


 Insulin Human


 Regular 15 unit/


 Sodium Chloride


 20 meq/Potassium


 Chloride 30 meq/


 Total Parenteral


 Nutrition/Amino


 Acids/Dextrose/


 Fat Emulsion


 Intravenous  1,920 ml @ 


 80 mls/hr  TPN  CONT  6/23/20 22:00


 6/24/20 21:59 DC 6/23/20 21:46


80 MLS/HR


 


 Potassium Acetate


 30 meq/Magnesium


 Sulfate 20 meq/


 Calcium Gluconate


 10 meq/


 Multivitamins 10


 ml/Chromium/


 Copper/Manganese/


 Seleni/Zn 0.5 ml/


 Insulin Human


 Regular 30 unit/


 Potassium


 Chloride 30 meq/


 Total Parenteral


 Nutrition/Amino


 Acids/Dextrose/


 Fat Emulsion


 Intravenous  1,920 ml @ 


 80 mls/hr  TPN  CONT  5/1/20 22:00


 5/2/20 21:59 DC 5/1/20 22:34


80 MLS/HR


 


 Potassium Acetate


 40 meq/Magnesium


 Sulfate 10 meq/


 Multivitamins 10


 ml/Chromium/


 Copper/Manganese/


 Seleni/Zn 1 ml/


 Insulin Human


 Regular 20 unit/


 Total Parenteral


 Nutrition/Amino


 Acids/Dextrose/


 Fat Emulsion


 Intravenous  1,920 ml @ 


 80 mls/hr  TPN  CONT  6/16/20 22:00


 6/17/20 21:59 DC 6/16/20 21:32


80 MLS/HR


 


 Potassium Acetate


 40 meq/Magnesium


 Sulfate 5 meq/


 Multivitamins 10


 ml/Chromium/


 Copper/Manganese/


 Seleni/Zn 1 ml/


 Insulin Human


 Regular 30 unit/


 Total Parenteral


 Nutrition/Amino


 Acids/Dextrose/


 Fat Emulsion


 Intravenous  1,920 ml @ 


 80 mls/hr  TPN  CONT  6/15/20 22:00


 6/16/20 19:34 DC 6/15/20 21:54


80 MLS/HR


 


 Potassium Acetate


 55 meq/Magnesium


 Sulfate 20 meq/


 Calcium Gluconate


 10 meq/


 Multivitamins 10


 ml/Chromium/


 Copper/Manganese/


 Seleni/Zn 0.5 ml/


 Insulin Human


 Regular 30 unit/


 Total Parenteral


 Nutrition/Amino


 Acids/Dextrose/


 Fat Emulsion


 Intravenous  1,920 ml @ 


 80 mls/hr  TPN  CONT  4/30/20 22:00


 5/1/20 21:59 DC 5/1/20 01:00


80 MLS/HR


 


 Potassium Acetate


 55 meq/Magnesium


 Sulfate 20 meq/


 Calcium Gluconate


 10 meq/


 Multivitamins 10


 ml/Chromium/


 Copper/Manganese/


 Seleni/Zn 0.5 ml/


 Insulin Human


 Regular 35 unit/


 Total Parenteral


 Nutrition/Amino


 Acids/Dextrose/


 Fat Emulsion


 Intravenous  1,920 ml @ 


 80 mls/hr  TPN  CONT  4/28/20 22:00


 4/29/20 21:59 DC 4/28/20 22:02


80 MLS/HR


 


 Potassium Acetate


 60 meq/Magnesium


 Sulfate 10 meq/


 Multivitamins 10


 ml/Chromium/


 Copper/Manganese/


 Seleni/Zn 1 ml/


 Insulin Human


 Regular 20 unit/


 Total Parenteral


 Nutrition/Amino


 Acids/Dextrose/


 Fat Emulsion


 Intravenous  1,920 ml @ 


 80 mls/hr  TPN  CONT  6/17/20 22:00


 6/18/20 21:59 DC 6/17/20 21:55


80 MLS/HR


 


 Potassium Acetate


 60 meq/Magnesium


 Sulfate 14 meq/


 Multivitamins 10


 ml/Chromium/


 Copper/Manganese/


 Seleni/Zn 1 ml/


 Insulin Human


 Regular 15 unit/


 Sodium Chloride


 20 meq/Total


 Parenteral


 Nutrition/Amino


 Acids/Dextrose/


 Fat Emulsion


 Intravenous  1,920 ml @ 


 80 mls/hr  TPN  CONT  6/22/20 22:00


 6/23/20 21:59 DC 6/22/20 21:54


80 MLS/HR


 


 Potassium Acetate


 60 meq/Magnesium


 Sulfate 14 meq/


 Multivitamins 10


 ml/Chromium/


 Copper/Manganese/


 Seleni/Zn 1 ml/


 Insulin Human


 Regular 15 unit/


 Total Parenteral


 Nutrition/Amino


 Acids/Dextrose/


 Fat Emulsion


 Intravenous  1,920 ml @ 


 80 mls/hr  TPN  CONT  6/21/20 22:00


 6/22/20 21:59 DC 6/21/20 22:22


80 MLS/HR


 


 Potassium Acetate


 60 meq/Magnesium


 Sulfate 14 meq/


 Multivitamins 10


 ml/Chromium/


 Copper/Manganese/


 Seleni/Zn 1 ml/


 Insulin Human


 Regular 20 unit/


 Total Parenteral


 Nutrition/Amino


 Acids/Dextrose/


 Fat Emulsion


 Intravenous  1,920 ml @ 


 80 mls/hr  TPN  CONT  6/18/20 22:00


 6/19/20 21:59 DC 6/18/20 22:26


80 MLS/HR


 


 Potassium Acetate


 60 meq/Magnesium


 Sulfate 5 meq/


 Multivitamins 10


 ml/Chromium/


 Copper/Manganese/


 Seleni/Zn 1 ml/


 Insulin Human


 Regular 30 unit/


 Total Parenteral


 Nutrition/Amino


 Acids/Dextrose/


 Fat Emulsion


 Intravenous  1,920 ml @ 


 80 mls/hr  TPN  CONT  6/6/20 22:00


 6/7/20 21:59 DC 6/6/20 21:54


80 MLS/HR


 


 Potassium Acetate


 65 meq/Magnesium


 Sulfate 20 meq/


 Calcium Gluconate


 10 meq/


 Multivitamins 10


 ml/Chromium/


 Copper/Manganese/


 Seleni/Zn 0.5 ml/


 Insulin Human


 Regular 30 unit/


 Total Parenteral


 Nutrition/Amino


 Acids/Dextrose/


 Fat Emulsion


 Intravenous  1,920 ml @ 


 80 mls/hr  TPN  CONT  4/29/20 22:00


 4/30/20 21:59 DC 4/29/20 22:22


80 MLS/HR


 


 Potassium Acetate


 80 meq/Magnesium


 Sulfate 5 meq/


 Multivitamins 10


 ml/Chromium/


 Copper/Manganese/


 Seleni/Zn 1 ml/


 Insulin Human


 Regular 20 unit/


 Total Parenteral


 Nutrition/Amino


 Acids/Dextrose/


 Fat Emulsion


 Intravenous  1,920 ml @ 


 80 mls/hr  TPN  CONT  6/5/20 22:00


 6/6/20 21:59 DC 6/5/20 21:59


80 MLS/HR


 


 Prochlorperazine


 Edisylate


  (Compazine)  5 mg  PACU PRN  PRN  4/27/20 07:00


 4/28/20 06:59 DC  





 


 Propofol


  (Diprivan)  200 mg  STK-MED ONCE  6/30/20 07:44


 6/30/20 07:44 DC  





 


 Ringer's Solution  1,000 ml @ 


 30 mls/hr  Q24H  4/27/20 07:00


 4/27/20 18:59 DC  





 


 Rocuronium Bromide


  (Zemuron)  100 mg  STK-MED ONCE  6/30/20 07:44


 6/30/20 07:44 DC  





 


 Saliva Substitute


  (Biotene


 Moisturizing


 Mouth)  2 spray  PRN Q15MIN  PRN  5/21/20 11:00


     





 


 Sevoflurane


  (Ultane)  60 ml  STK-MED ONCE  4/27/20 12:26


 4/27/20 12:27 DC  





 


 Sodium


 Bicarbonate 150


 meq/Dextrose  1,150 ml @ 


 75 mls/hr  1X  ONCE  6/30/20 16:30


 7/1/20 07:49 DC 6/30/20 20:02


75 MLS/HR


 


 Sodium


 Bicarbonate 50


 meq/Sodium


 Chloride  1,050 ml @ 


 75 mls/hr  Q14H  3/18/20 07:30


 3/23/20 10:28 DC 3/22/20 21:10


75 MLS/HR


 


 Sodium Acetate 50


 meq/Potassium


 Acetate 55 meq/


 Magnesium Sulfate


 20 meq/Calcium


 Gluconate 10 meq/


 Multivitamins 10


 ml/Chromium/


 Copper/Manganese/


 Seleni/Zn 0.5 ml/


 Insulin Human


 Regular 35 unit/


 Total Parenteral


 Nutrition/Amino


 Acids/Dextrose/


 Fat Emulsion


 Intravenous  1,800 ml @ 


 75 mls/hr  TPN  CONT  4/25/20 22:00


 4/26/20 21:59 DC 4/25/20 22:03


75 MLS/HR


 


 Sodium Bicarbonate


  (Sodium Bicarb


 Adult 8.4% Syr)  100 meq  1X  ONCE  6/30/20 16:30


 6/30/20 16:31 DC 6/30/20 17:07


100 MEQ


 


 Sodium Chloride


  (Normal Saline


 Flush)  3 ml  QSHIFT  PRN  6/30/20 14:45


     





 


 Sodium Chloride


 80 meq/Potassium


 Chloride 30 meq/


 Potassium Acetate


 30 meq/Magnesium


 Sulfate 14 meq/


 Multivitamins 10


 ml/Chromium/


 Copper/Manganese/


 Seleni/Zn 1 ml/


 Insulin Human


 Regular 15 unit/


 Total Parenteral


 Nutrition/Amino


 Acids/Dextrose/


 Fat Emulsion


 Intravenous  1,920 ml @ 


 80 mls/hr  TPN  CONT  7/1/20 22:00


 7/2/20 21:59 DC 7/1/20 23:05


80 MLS/HR


 


 Sodium Chloride


 90 meq/Calcium


 Gluconate 10 meq/


 Multivitamins 10


 ml/Chromium/


 Copper/Manganese/


 Seleni/Zn 0.5 ml/


 Total Parenteral


 Nutrition/Amino


 Acids/Dextrose/


 Fat Emulsion


 Intravenous  1,512 ml @ 


 63 mls/hr  TPN  CONT  3/18/20 22:00


 3/19/20 21:59 DC 3/18/20 22:06


63 MLS/HR


 


 Sodium Chloride


 90 meq/Calcium


 Gluconate 10 meq/


 Multivitamins 10


 ml/Chromium/


 Copper/Manganese/


 Seleni/Zn 1 ml/


 Total Parenteral


 Nutrition/Amino


 Acids/Dextrose/


 Fat Emulsion


 Intravenous  55.005 ml


  @ 2.292


 mls/hr  TPN  CONT  3/18/20 22:00


 3/18/20 12:33 DC  





 


 Sodium Chloride


 90 meq/Magnesium


 Sulfate 10 meq/


 Calcium Gluconate


 20 meq/


 Multivitamins 10


 ml/Chromium/


 Copper/Manganese/


 Seleni/Zn 0.5 ml/


 Total Parenteral


 Nutrition/Amino


 Acids/Dextrose/


 Fat Emulsion


 Intravenous  1,512 ml @ 


 63 mls/hr  TPN  CONT  3/19/20 22:00


 3/20/20 21:59 DC 3/19/20 22:25


63 MLS/HR


 


 Sodium Chloride


 90 meq/Magnesium


 Sulfate 12 meq/


 Calcium Gluconate


 15 meq/


 Multivitamins 10


 ml/Chromium/


 Copper/Manganese/


 Seleni/Zn 0.5 ml/


 Insulin Human


 Regular 25 unit/


 Total Parenteral


 Nutrition/Amino


 Acids/Dextrose/


 Fat Emulsion


 Intravenous  1,400 ml @ 


 58.333 mls/


 hr  TPN  CONT  4/8/20 22:00


 4/9/20 21:59 DC 4/8/20 21:41


58.333 MLS/HR


 


 Sodium Chloride


 90 meq/Potassium


 Chloride 15 meq/


 Magnesium Sulfate


 12 meq/Calcium


 Gluconate 15 meq/


 Multivitamins 10


 ml/Chromium/


 Copper/Manganese/


 Seleni/Zn 0.5 ml/


 Insulin Human


 Regular 25 unit/


 Total Parenteral


 Nutrition/Amino


 Acids/Dextrose/


 Fat Emulsion


 Intravenous  1,400 ml @ 


 58.333 mls/


 hr  TPN  CONT  4/7/20 22:00


 4/8/20 21:59 DC 4/7/20 22:13


58.333 MLS/HR


 


 Sodium Chloride


 90 meq/Potassium


 Chloride 15 meq/


 Potassium


 Phosphate 10 mmol/


 Magnesium Sulfate


 8 meq/Calcium


 Gluconate 15 meq/


 Multivitamins 10


 ml/Chromium/


 Copper/Manganese/


 Seleni/Zn 0.5 ml/


 Insulin Human


 Regular 25 unit/


 Total Parenteral


 Nutrition/Amino


 Acids/Dextrose/


 Fat Emulsion


 Intravenous  1,400 ml @ 


 58.333 mls/


 hr  TPN  CONT  4/5/20 22:00


 4/6/20 21:59 DC 4/5/20 21:20


58.333 MLS/HR


 


 Sodium Chloride


 90 meq/Potassium


 Chloride 15 meq/


 Potassium


 Phosphate 10 mmol/


 Magnesium Sulfate


 10 meq/Calcium


 Gluconate 20 meq/


 Multivitamins 10


 ml/Chromium/


 Copper/Manganese/


 Seleni/Zn 0.5 ml/


 Total Parenteral


 Nutrition/Amino


 Acids/Dextrose/


 Fat Emulsion


 Intravenous  1,400 ml @ 


 58.333 mls/


 hr  TPN  CONT  3/23/20 22:00


 3/24/20 21:59 DC 3/23/20 21:42


58.333 MLS/HR


 


 Sodium Chloride


 90 meq/Potassium


 Chloride 15 meq/


 Potassium


 Phosphate 10 mmol/


 Magnesium Sulfate


 12 meq/Calcium


 Gluconate 15 meq/


 Multivitamins 10


 ml/Chromium/


 Copper/Manganese/


 Seleni/Zn 0.5 ml/


 Insulin Human


 Regular 25 unit/


 Total Parenteral


 Nutrition/Amino


 Acids/Dextrose/


 Fat Emulsion


 Intravenous  1,400 ml @ 


 58.333 mls/


 hr  TPN  CONT  4/6/20 22:00


 4/7/20 21:59 DC 4/6/20 22:24


58.333 MLS/HR


 


 Sodium Chloride


 90 meq/Potassium


 Chloride 15 meq/


 Potassium


 Phosphate 15 mmol/


 Magnesium Sulfate


 10 meq/Calcium


 Gluconate 15 meq/


 Multivitamins 10


 ml/Chromium/


 Copper/Manganese/


 Seleni/Zn 0.5 ml/


 Total Parenteral


 Nutrition/Amino


 Acids/Dextrose/


 Fat Emulsion


 Intravenous  1,400 ml @ 


 58.333 mls/


 hr  TPN  CONT  3/24/20 22:00


 3/25/20 21:59 DC 3/24/20 22:17


58.333 MLS/HR


 


 Sodium Chloride


 90 meq/Potassium


 Chloride 15 meq/


 Potassium


 Phosphate 15 mmol/


 Magnesium Sulfate


 10 meq/Calcium


 Gluconate 20 meq/


 Multivitamins 10


 ml/Chromium/


 Copper/Manganese/


 Seleni/Zn 0.5 ml/


 Total Parenteral


 Nutrition/Amino


 Acids/Dextrose/


 Fat Emulsion


 Intravenous  1,200 ml @ 


 50 mls/hr  TPN  CONT  3/22/20 22:00


 3/22/20 14:17 DC  





 


 Sodium Chloride


 90 meq/Potassium


 Chloride 15 meq/


 Potassium


 Phosphate 18 mmol/


 Magnesium Sulfate


 8 meq/Calcium


 Gluconate 15 meq/


 Multivitamins 10


 ml/Chromium/


 Copper/Manganese/


 Seleni/Zn 0.5 ml/


 Insulin Human


 Regular 10 unit/


 Total Parenteral


 Nutrition/Amino


 Acids/Dextrose/


 Fat Emulsion


 Intravenous  1,400 ml @ 


 58.333 mls/


 hr  TPN  CONT  3/27/20 22:00


 3/28/20 21:59 DC 3/27/20 21:43


58.333 MLS/HR


 


 Sodium Chloride


 90 meq/Potassium


 Chloride 15 meq/


 Potassium


 Phosphate 18 mmol/


 Magnesium Sulfate


 8 meq/Calcium


 Gluconate 15 meq/


 Multivitamins 10


 ml/Chromium/


 Copper/Manganese/


 Seleni/Zn 0.5 ml/


 Insulin Human


 Regular 15 unit/


 Total Parenteral


 Nutrition/Amino


 Acids/Dextrose/


 Fat Emulsion


 Intravenous  1,400 ml @ 


 58.333 mls/


 hr  TPN  CONT  3/30/20 22:00


 3/31/20 21:59 DC 3/30/20 21:47


58.333 MLS/HR


 


 Sodium Chloride


 90 meq/Potassium


 Chloride 15 meq/


 Potassium


 Phosphate 18 mmol/


 Magnesium Sulfate


 8 meq/Calcium


 Gluconate 15 meq/


 Multivitamins 10


 ml/Chromium/


 Copper/Manganese/


 Seleni/Zn 0.5 ml/


 Insulin Human


 Regular 20 unit/


 Total Parenteral


 Nutrition/Amino


 Acids/Dextrose/


 Fat Emulsion


 Intravenous  1,400 ml @ 


 58.333 mls/


 hr  TPN  CONT  4/2/20 22:00


 4/3/20 21:59 DC 4/2/20 22:45


58.333 MLS/HR


 


 Sodium Chloride


 90 meq/Potassium


 Chloride 15 meq/


 Potassium


 Phosphate 18 mmol/


 Magnesium Sulfate


 8 meq/Calcium


 Gluconate 15 meq/


 Multivitamins 10


 ml/Chromium/


 Copper/Manganese/


 Seleni/Zn 0.5 ml/


 Total Parenteral


 Nutrition/Amino


 Acids/Dextrose/


 Fat Emulsion


 Intravenous  1,400 ml @ 


 58.333 mls/


 hr  TPN  CONT  3/26/20 22:00


 3/27/20 21:59 DC 3/26/20 22:00


58.333 MLS/HR


 


 Sodium Chloride


 90 meq/Potassium


 Chloride 30 meq/


 Potassium Acetate


 30 meq/Magnesium


 Sulfate 15 meq/


 Multivitamins 10


 ml/Chromium/


 Copper/Manganese/


 Seleni/Zn 1 ml/


 Insulin Human


 Regular 15 unit/


 Total Parenteral


 Nutrition/Amino


 Acids/Dextrose/


 Fat Emulsion


 Intravenous  1,680 ml @ 


 70 mls/hr  TPN  CONT  7/16/20 22:00


 7/17/20 21:59 DC 7/16/20 22:06


70 MLS/HR


 


 Sodium Chloride


 90 meq/Potassium


 Phosphate 15 mmol/


 Magnesium Sulfate


 12 meq/Calcium


 Gluconate 15 meq/


 Multivitamins 10


 ml/Chromium/


 Copper/Manganese/


 Seleni/Zn 0.5 ml/


 Insulin Human


 Regular 30 unit/


 Total Parenteral


 Nutrition/Amino


 Acids/Dextrose/


 Fat Emulsion


 Intravenous  1,400 ml @ 


 58.333 mls/


 hr  TPN  CONT  4/10/20 22:00


 4/11/20 21:59 DC 4/10/20 21:49


58.333 MLS/HR


 


 Sodium Chloride


 90 meq/Potassium


 Phosphate 15 mmol/


 Magnesium Sulfate


 12 meq/Calcium


 Gluconate 15 meq/


 Multivitamins 10


 ml/Chromium/


 Copper/Manganese/


 Seleni/Zn 0.5 ml/


 Insulin Human


 Regular 40 unit/


 Total Parenteral


 Nutrition/Amino


 Acids/Dextrose/


 Fat Emulsion


 Intravenous  1,400 ml @ 


 58.333 mls/


 hr  TPN  CONT  4/11/20 22:00


 4/12/20 21:59 DC 4/11/20 21:21


58.333 MLS/HR


 


 Sodium Chloride


 90 meq/Potassium


 Phosphate 19 mmol/


 Magnesium Sulfate


 12 meq/Calcium


 Gluconate 15 meq/


 Multivitamins 10


 ml/Chromium/


 Copper/Manganese/


 Seleni/Zn 0.5 ml/


 Insulin Human


 Regular 40 unit/


 Total Parenteral


 Nutrition/Amino


 Acids/Dextrose/


 Fat Emulsion


 Intravenous  1,400 ml @ 


 58.333 mls/


 hr  TPN  CONT  4/12/20 22:00


 4/13/20 21:59 DC 4/12/20 21:54


58.333 MLS/HR


 


 Sodium Chloride


 90 meq/Potassium


 Phosphate 5 mmol/


 Magnesium Sulfate


 12 meq/Calcium


 Gluconate 15 meq/


 Multivitamins 10


 ml/Chromium/


 Copper/Manganese/


 Seleni/Zn 0.5 ml/


 Insulin Human


 Regular 30 unit/


 Total Parenteral


 Nutrition/Amino


 Acids/Dextrose/


 Fat Emulsion


 Intravenous  1,400 ml @ 


 58.333 mls/


 hr  TPN  CONT  4/9/20 22:00


 4/10/20 21:59 DC 4/9/20 22:08


58.333 MLS/HR


 


 Sodium Chloride


 100 meq/Potassium


 Chloride 30 meq/


 Potassium Acetate


 30 meq/Magnesium


 Sulfate 12 meq/


 Multivitamins 10


 ml/Chromium/


 Copper/Manganese/


 Seleni/Zn 1 ml/


 Insulin Human


 Regular 15 unit/


 Total Parenteral


 Nutrition/Amino


 Acids/Dextrose/


 Fat Emulsion


 Intravenous  1,680 ml @ 


 70 mls/hr  TPN  CONT  7/5/20 22:00


 7/6/20 21:59 DC 7/5/20 21:23


70 MLS/HR


 


 Sodium Chloride


 100 meq/Potassium


 Chloride 40 meq/


 Magnesium Sulfate


 15 meq/Calcium


 Gluconate 15 meq/


 Multivitamins 10


 ml/Chromium/


 Copper/Manganese/


 Seleni/Zn 0.5 ml/


 Insulin Human


 Regular 35 unit/


 Total Parenteral


 Nutrition/Amino


 Acids/Dextrose/


 Fat Emulsion


 Intravenous  1,400 ml @ 


 58.333 mls/


 hr  TPN  CONT  4/19/20 22:00


 4/20/20 21:59 DC 4/19/20 22:46


58.333 MLS/HR


 


 Sodium Chloride


 100 meq/Potassium


 Chloride 40 meq/


 Magnesium Sulfate


 20 meq/Calcium


 Gluconate 10 meq/


 Multivitamins 10


 ml/Chromium/


 Copper/Manganese/


 Seleni/Zn 0.5 ml/


 Insulin Human


 Regular 35 unit/


 Total Parenteral


 Nutrition/Amino


 Acids/Dextrose/


 Fat Emulsion


 Intravenous  1,400 ml @ 


 58.333 mls/


 hr  TPN  CONT  4/23/20 22:00


 4/24/20 21:59 DC 4/24/20 00:06


58.333 MLS/HR


 


 Sodium Chloride


 100 meq/Potassium


 Chloride 40 meq/


 Magnesium Sulfate


 20 meq/Calcium


 Gluconate 15 meq/


 Multivitamins 10


 ml/Chromium/


 Copper/Manganese/


 Seleni/Zn 0.5 ml/


 Insulin Human


 Regular 35 unit/


 Total Parenteral


 Nutrition/Amino


 Acids/Dextrose/


 Fat Emulsion


 Intravenous  1,400 ml @ 


 58.333 mls/


 hr  TPN  CONT  4/22/20 22:00


 4/23/20 21:59 DC 4/22/20 22:27


58.333 MLS/HR


 


 Sodium Chloride


 100 meq/Potassium


 Phosphate 10 mmol/


 Magnesium Sulfate


 12 meq/Calcium


 Gluconate 15 meq/


 Multivitamins 10


 ml/Chromium/


 Copper/Manganese/


 Seleni/Zn 0.5 ml/


 Insulin Human


 Regular 35 unit/


 Potassium


 Chloride 20 meq/


 Total Parenteral


 Nutrition/Amino


 Acids/Dextrose/


 Fat Emulsion


 Intravenous  1,400 ml @ 


 58.333 mls/


 hr  TPN  CONT  4/16/20 22:00


 4/17/20 21:59 DC 4/16/20 22:10


58.333 MLS/HR


 


 Sodium Chloride


 100 meq/Potassium


 Phosphate 19 mmol/


 Magnesium Sulfate


 12 meq/Calcium


 Gluconate 15 meq/


 Multivitamins 10


 ml/Chromium/


 Copper/Manganese/


 Seleni/Zn 0.5 ml/


 Insulin Human


 Regular 40 unit/


 Potassium


 Chloride 20 meq/


 Total Parenteral


 Nutrition/Amino


 Acids/Dextrose/


 Fat Emulsion


 Intravenous  1,400 ml @ 


 58.333 mls/


 hr  TPN  CONT  4/15/20 22:00


 4/16/20 21:59 DC 4/15/20 21:20


58.333 MLS/HR


 


 Sodium Chloride


 100 meq/Potassium


 Phosphate 5 mmol/


 Magnesium Sulfate


 12 meq/Calcium


 Gluconate 15 meq/


 Multivitamins 10


 ml/Chromium/


 Copper/Manganese/


 Seleni/Zn 0.5 ml/


 Insulin Human


 Regular 35 unit/


 Potassium


 Chloride 20 meq/


 Total Parenteral


 Nutrition/Amino


 Acids/Dextrose/


 Fat Emulsion


 Intravenous  1,400 ml @ 


 58.333 mls/


 hr  TPN  CONT  4/17/20 22:00


 4/18/20 21:59 DC 4/17/20 22:59


58.333 MLS/HR


 


 Sodium Chloride


 110 meq/Potassium


 Chloride 30 meq/


 Potassium Acetate


 30 meq/Magnesium


 Sulfate 15 meq/


 Multivitamins 10


 ml/Chromium/


 Copper/Manganese/


 Seleni/Zn 1 ml/


 Insulin Human


 Regular 15 unit/


 Total Parenteral


 Nutrition/Amino


 Acids/Dextrose/


 Fat Emulsion


 Intravenous  1,680 ml @ 


 70 mls/hr  TPN  CONT  7/18/20 22:00


 7/19/20 21:59 DC 7/18/20 22:01


70 MLS/HR


 


 Sodium Chloride


 110 meq/Sodium


 Phosphate 10 mmol/


 Potassium


 Chloride 30 meq/


 Potassium Acetate


 30 meq/Magnesium


 Sulfate 15 meq/


 Multivitamins 10


 ml/Chromium/


 Copper/Manganese/


 Seleni/Zn 1 ml/


 Insulin Human


 Regular 15 unit/


 Total Parenteral


 Nutrition/Amino


 Acids/Dextrose/


 Fat Emulsion


 Intravenous  1,680 ml @ 


 70 mls/hr  TPN  CONT  7/19/20 22:00


 7/20/20 21:59 DC 7/19/20 22:03


70 MLS/HR


 


 Sodium Chloride


 120 meq/Sodium


 Phosphate 10 mmol/


 Potassium


 Chloride 30 meq/


 Potassium Acetate


 30 meq/Magnesium


 Sulfate 15 meq/


 Multivitamins 10


 ml/Chromium/


 Copper/Manganese/


 Seleni/Zn 1 ml/


 Insulin Human


 Regular 15 unit/


 Total Parenteral


 Nutrition/Amino


 Acids/Dextrose/


 Fat Emulsion


 Intravenous  1,680 ml @ 


 70 mls/hr  TPN  CONT  7/24/20 22:00


 7/25/20 21:59  7/24/20 21:50


70 MLS/HR


 


 Succinylcholine


 Chloride


  (Anectine)  120 mg  1X  ONCE  3/23/20 08:30


 3/23/20 08:31 DC 3/23/20 08:34


120 MG


 


 Vancomycin HCl


  (Vanco Per


 Pharmacy)  1 each  PRN DAILY  PRN  7/12/20 09:15


 7/15/20 07:41 DC 7/14/20 02:46


1 EACH


 


 Vancomycin HCl


  (Vancomycin


 Random Level)  1 each  1X  ONCE  7/14/20 01:00


 7/14/20 01:01 DC 7/14/20 01:00


1 EACH


 


 Vancomycin HCl


  (Vancomycin


 Trough Level)  1 each  1X  ONCE  7/15/20 09:30


 7/15/20 09:31 Cancel  





 


 Vancomycin HCl


 1.5 gm/Sodium


 Chloride  500 ml @ 


 250 mls/hr  Q12H  7/14/20 10:00


 7/15/20 07:41 DC 7/14/20 22:07


250 MLS/HR


 


 Vancomycin HCl 2


 gm/Sodium Chloride  500 ml @ 


 250 mls/hr  1X  ONCE  7/12/20 10:00


 7/12/20 11:59 DC 7/12/20 10:34


250 MLS/HR


 


 Vasopressin


  (Vasostrict)  20 unit  STK-MED ONCE  6/30/20 12:23


 6/30/20 12:23 DC  





 


 Vasopressin 20


 unit/Dextrose  101 ml @ 


 12 mls/hr  CONT  PRN  6/30/20 15:30


    7/7/20 04:17


12 MLS/HR


 


 Vecuronium Bromide


  (Norcuron Bolus)  6 mg  PRN Q6HRS  PRN  5/7/20 19:15


 5/7/20 19:35 DC  














Labs:


Lab





Laboratory Tests








Test


 7/24/20


12:24 7/24/20


17:23 7/24/20


23:05 7/25/20


04:45


 


Glucose (Fingerstick)


 156 mg/dL


(70-99) 123 mg/dL


(70-99) 138 mg/dL


(70-99) 





 


Sodium Level


 


 


 


 137 mmol/L


(136-145)


 


Potassium Level


 


 


 


 4.3 mmol/L


(3.5-5.1)


 


Chloride Level


 


 


 


 103 mmol/L


()


 


Carbon Dioxide Level


 


 


 


 27 mmol/L


(21-32)


 


Anion Gap    7 (6-14) 


 


Blood Urea Nitrogen


 


 


 


 15 mg/dL


(7-20)


 


Creatinine


 


 


 


 0.6 mg/dL


(0.6-1.0)


 


Estimated GFR


(Cockcroft-Gault) 


 


 


 106.3 





 


Glucose Level


 


 


 


 140 mg/dL


(70-99)


 


Calcium Level


 


 


 


 10.0 mg/dL


(8.5-10.1)


 


Phosphorus Level


 


 


 


 4.2 mg/dL


(2.6-4.7)


 


Test


 7/25/20


05:48 


 


 





 


Glucose (Fingerstick)


 138 mg/dL


(70-99) 


 


 











Micro


        NEG ANSON 56


        PSEUDOMONAS AERUGINOSA


        ANTIBIOTIC                        RESULT          INTERPRETATION


        AMIKACIN                          <=16                  S


        AZTREONAM                         >16                   R


        CEFTAZIDIME                       >16                   R


        CIPROFLOXACIN                     <=0.25                S


        CEFEPIME                          16                    I


        GENTAMICIN                        <=2                   S


        LEVOFLOXACIN                      <=0.5                 S














                               ** CONTINUED ON NEXT PAGE **





----------------------------------------

----------------------------------------------------





RUN DATE: 06/10/20                  [facility] *LIVE*               

  PAGE 2   


RUN TIME: 1121                            Specimen Inquiry                    


------------------------------

--------------------------------------------------------------





SPEC: [id number]    PATIENT: GENEVAJESENIA                [id number]  

(Continued)


 

--------------------------------------------------------------------------------


------------








--------

--------------------------------------------------------------------------------


----





  Procedure                         Result                                      

         


-------------------------------------------------------------------------------

-------------





  ANTIMICROBIAL SUSCEPTIBILITY  Preliminary   (continued)


        MEROPENEM                         <=1                   S


        PIPERACILLIN/TAZOBACTAM           64                    S


        TOBRAMYCIN                        <=2                   S


        Unless otherwise specified, Testing Performed by:


        [facility]


        [address] [place] [address]


        For Inquires, the Physician may contact the Microbiology


        department at [phone]





 

--------------------------------------------------------------------------------


------------





Objective:


Assessment:


Patient with prolonged hospitalization more than 4 months


Multiple medical problems


Multiple surgical procedures





S/P Exp. 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 

parapsilosis & PSAE (I-merrem/ceftazidime/AZT/cefepime))


Leucocytosis -trending upward 


Fever 


Acute gallstone pancreatitis with persistent necrosis


  - 4/9.  CT A/P Increased ascites. Persistent evidence of necrotizing 

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


  - 4/27. status post ROBERT drain placement; C. parapsilosis. s/p drain 5/6 + yeast

& high amylase; s/p additional drain on 5/8. Drains removed. 


  -5/6. fluid  candida parapsilosis fluid, amylase high


  - 6/6 showed multiple pseudocysts, slight larger on the right. s/p drains x 3,

6/7.  + PSAE (MDRO-R Cefepime, Zosyn ANSON < 64) and yeast, 


  -6/7 s/p drain replacement x 3; fluid cult PSAE (MDRO), yeast; treated


  -7/12 CT A/P shows smaller fluid collections.  


  -722 CT abdomen and pelvis drains in place       


Ascites s/p paracentesis 4/15 & 5/6. C. parapsilosis 


Cholelithiasis with thickening of the gallbladder wall.


JED, Hyperkalemia, Metabolic acidosis off dialysis


Acute hypoxic resp failure. trach/vent. sputum 6/13  + PSAE (I merrem) ; sputum 

culture July 19+ for PSAE R Merrem, sensitive to cefepime


Pleural effusion status post CTS left side


 Abdominal fluid culture 6 /7 MDRO Pseudomonas, yeast


Sputum culture positive 7/19 for MDRO Pseudomonas


Chest tube fluid positive for 7/21 Candida Parapsilosis








Generalized debility





Plan:


Plan of Care


Continue Avycaz /micafungin


off dapto 7/24


Follow-up C. difficile PCR


BC from 7 /21  neg to date 


Monitor WBC/temp 


Follow cultures


Wound care /drain management as directed


Contact isolation for CRE/MDRO








Critically ill





Long term prognosis  poor





D/w nursing











IVAN FRANZ MD           Jul 25, 2020 07:56

## 2020-07-25 NOTE — PDOC
PROGRESS NOTES


Chief Complaint


Chief Complaint





IMPRESSION=============








S/P Exp. 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 

parapsilosis & PSAE (I-merrem/ceftazidime/AZT/cefepime))


Leucocytosis -trending upward 


Fever 


Acute gallstone pancreatitis with persistent necrosis


Postop (Exploratory laparotomy, lysis of adhesions, subtotal cholecystectomy 

with cholangiogram, gastrojejunostomy tube placement, pancreatic necrosectomy)


Acute hypoxic Respiratory failure required  mechanical ventilation


Tracheostomy


bilateral pleural effusions/pulm edema s/p Throacentesis on 6/15/2020


Severe Acute gallstone pancreatitis (not a surgical candidate at this time) with

necrosis


Acute kidney failure now requiring dialysis


Gallstones (Calculus of gallbladder with acute cholecystitis without 

obstruction)


HTN 


Intractable pain


Intractable nausea


Covid 19 negative. 


Acute on chronic anemia 


EEG: No seizure activity


Fever  - intermittent 


? Ileus with vomiting


Abd distention - U/S and CT reviewed s/p 0.4 L of opaque, debris-containing a

scites was removed 5/6


Acute pancreatitis with persistent necrosis


Gallstone pancreatitis with necrosis. 


   -CT A/P 6/6 showed multiple pseudocysts, slight larger on the right. s/p 

drains x 3, 6/7.  + PSAE (MDRO-R Cefepime, Zosyn ANSON < 64) and yeast, 


   -s/p drain 4/27. C. parapsilosis. s/p drain 5/6 + yeast & high amylase; s/p 

additional drain on 5/8. Drains removed. 


Ascites s/p paracentesis 4/15 & 5/6. C. parapsilosis 


JED. off HD. 


A large fluid collection in the pancreatic bed has slightly decreased in size, 

described below, the pancreas itself is difficult to  visualize, which could be 

due to necrosis or obscuration of pancreatic  parenchyma from the surrounding 

fluid collection.6/15 


- 4/27 status post ROBERT drain placement + C paropsilosis. s/p additional drains 

5/8


Anemia - S/p PRBCs. 


Cholelithiasis with thickening of the gallbladder wall.


Leucocytosis improving


JED, hyperkalemia, Metabolic acidosis off dialysis


hypocalcemia 


Prediabetes


HTN


s/p trach


Hyperglycemia


severe protein-caloric malnutrition


Moderate to large left pleural effusion with atelectasis and collapse  of most 

of the left lower lobe, stable


Extensive retroperitoneal fluid collections persist. Percutaneous  drains remain

within the collections in both paracolic gutters. These  communicate with 

additional pelvic and peripancreatic collections.











PLAN================











Continue Avycaz /micafungin


DC dapto


Follow-up C. difficile PCR


BC from 7/15 neg to date 








37 MIN CC TIME





History of Present Illness


History of Present Illness


7/25 /2020





Patient seen in and examined in the ICU


She is still extremely critically ill


Appears weak, frail, and pale


Better color today with improved eye contact and expression


Discussed with RN


Chart reviewed











 








7/21/2020


Patient seen and examined in the ICU


She is still extremely critically ill


Appears extremely weak frail and pale


Discussed with RN


Chart reviewed





Vitals


Vitals





Vital Signs








  Date Time  Temp Pulse Resp B/P (MAP) Pulse Ox O2 Delivery O2 Flow Rate FiO2


 


7/25/20 09:09     97 Room Air 2.0 


 


7/25/20 07:00 97.8 131  136/72 (93)    





 97.8       


 


7/25/20 04:40   32     











Physical Exam


Physical Exam


GENERAL: pt in bed, appears weak


HEENT: Pupils equal, oral cavity dry. NGT out


NECK:  Tracheostomy 


LUNGS: Diminished aeration bases,  CT on left 


HEART:  S1, S2,  tachy 110s


ABDOMEN: Distended, bowel sounds hypoactive, soft, richardson x 2, ROBERT drains, G-J 

tube


: [place] in place 


EXTREMITIES: Trace generalized edema, no cyanosis. AMRTHA hose bilaterally, 


SKIN: warm touch. No signs of rash.  


LUE-PICC without signs of complications


General:  Cooperative, mild distress


Heart:  Other (distant heart sounds, tachycardic)


Lungs:  Crackles


Abdomen:  Normal bowel sounds, Soft, No tenderness (Incisional tenderness wounds

clean dry and intact wound VAC in place drains in place)


Extremities:  Other (Diffuse edema)


Skin:  No rashes, No significant lesion





Labs


LABS


                                                             MILI KANG MD,STEVE LONGO,MARCO ANTONIO MEDINA,OVIDIO LARSEN MD


ORDERED:  BCULT                                                                 

 


 

--------------------------------------------------------------------------------


------------





  Procedure                         Result                                      

         


-------------------------------------------

-------------------------------------------------





  BLOOD CULTURE  Preliminary  


        NO GROWTH AFTER 4 DAYS                                 





 

--------------------------------------------------------------------------------


------------




















MMENTS: CHEST FLUID                                                 


---------------------------------------------------------------------

-----------------------





  Procedure                         Result                                      

         


------------

--------------------------------------------------------------------------------





  GRAM STAIN  Final  


        Final





        NO ORGANISMS SEEN.


        SQUAMOUS EPI CELL:NOT APPLICABLE


        PMN (WBCs):RARE


        Unless otherwise specified, Testing Performed by:


        [address] [place] [address]


        For Inquires, the Physician may contact the Microbiology


        department at [phone]





  ANAEROBIC-AEROBIC CULTURE  Preliminary  


        Preliminary





        No Growth on 07/22/20 at 0957


        FEW YEAST on 07/23/20 at 1500


        FINAL ID= [NAHID PARAPSILOSIS]


        CANDIDA PARAPSILOSIS


        Unless otherwise specified, Testing Performed by:


        [address] [address]


        For Inquires, the Physician may contact the Microbiology


        department at [phone]





 

--------------------------------------------------------------------------------


------------





Laboratory Tests








Test


 7/24/20


12:24 7/24/20


17:23 7/24/20


23:05 7/25/20


04:45


 


Glucose (Fingerstick)


 156 mg/dL


(70-99) 123 mg/dL


(70-99) 138 mg/dL


(70-99) 





 


Sodium Level


 


 


 


 137 mmol/L


(136-145)


 


Potassium Level


 


 


 


 4.3 mmol/L


(3.5-5.1)


 


Chloride Level


 


 


 


 103 mmol/L


()


 


Carbon Dioxide Level


 


 


 


 27 mmol/L


(21-32)


 


Anion Gap    7 (6-14) 


 


Blood Urea Nitrogen


 


 


 


 15 mg/dL


(7-20)


 


Creatinine


 


 


 


 0.6 mg/dL


(0.6-1.0)


 


Estimated GFR


(Cockcroft-Gault) 


 


 


 106.3 





 


Glucose Level


 


 


 


 140 mg/dL


(70-99)


 


Calcium Level


 


 


 


 10.0 mg/dL


(8.5-10.1)


 


Phosphorus Level


 


 


 


 4.2 mg/dL


(2.6-4.7)


 


Test


 7/25/20


05:48 


 


 





 


Glucose (Fingerstick)


 138 mg/dL


(70-99) 


 


 














Assessment and Plan


Assessmemt and Plan


Problems


Medical Problems:


(1) Acute pancreatitis


Status: Acute  





(2) Cholelithiasis


Status: Acute  











Comment


Review of Relevant


I have reviewed the following items josy (where applicable) has been applied.


Labs





Laboratory Tests








Test


 7/23/20


12:26 7/23/20


18:12 7/24/20


00:02 7/24/20


06:00


 


Glucose (Fingerstick)


 143 mg/dL


(70-99) 126 mg/dL


(70-99) 138 mg/dL


(70-99) 





 


White Blood Count


 


 


 


 16.9 x10^3/uL


(4.0-11.0)


 


Red Blood Count


 


 


 


 2.66 x10^6/uL


(3.50-5.40)


 


Hemoglobin


 


 


 


 7.9 g/dL


(12.0-15.5)


 


Hematocrit


 


 


 


 24.3 %


(36.0-47.0)


 


Mean Corpuscular Volume    91 fL () 


 


Mean Corpuscular Hemoglobin    30 pg (25-35) 


 


Mean Corpuscular Hemoglobin


Concent 


 


 


 32 g/dL


(31-37)


 


Red Cell Distribution Width


 


 


 


 16.7 %


(11.5-14.5)


 


Platelet Count


 


 


 


 569 x10^3/uL


(140-400)


 


Neutrophils (%) (Auto)    83 % (31-73) 


 


Lymphocytes (%) (Auto)    9 % (24-48) 


 


Monocytes (%) (Auto)    6 % (0-9) 


 


Eosinophils (%) (Auto)    2 % (0-3) 


 


Basophils (%) (Auto)    1 % (0-3) 


 


Neutrophils # (Auto)


 


 


 


 14.1 x10^3/uL


(1.8-7.7)


 


Lymphocytes # (Auto)


 


 


 


 1.5 x10^3/uL


(1.0-4.8)


 


Monocytes # (Auto)


 


 


 


 1.0 x10^3/uL


(0.0-1.1)


 


Eosinophils # (Auto)


 


 


 


 0.3 x10^3/uL


(0.0-0.7)


 


Basophils # (Auto)


 


 


 


 0.1 x10^3/uL


(0.0-0.2)


 


Test


 7/24/20


06:21 7/24/20


07:30 7/24/20


12:24 7/24/20


17:23


 


Glucose (Fingerstick)


 150 mg/dL


(70-99) 


 156 mg/dL


(70-99) 123 mg/dL


(70-99)


 


Sodium Level


 


 136 mmol/L


(136-145) 


 





 


Potassium Level


 


 4.2 mmol/L


(3.5-5.1) 


 





 


Chloride Level


 


 103 mmol/L


() 


 





 


Carbon Dioxide Level


 


 26 mmol/L


(21-32) 


 





 


Anion Gap  7 (6-14)   


 


Blood Urea Nitrogen


 


 13 mg/dL


(7-20) 


 





 


Creatinine


 


 0.6 mg/dL


(0.6-1.0) 


 





 


Estimated GFR


(Cockcroft-Gault) 


 106.3 


 


 





 


BUN/Creatinine Ratio  22 (6-20)   


 


Glucose Level


 


 125 mg/dL


(70-99) 


 





 


Calcium Level


 


 9.4 mg/dL


(8.5-10.1) 


 





 


Total Bilirubin


 


 0.5 mg/dL


(0.2-1.0) 


 





 


Aspartate Amino Transf


(AST/SGOT) 


 16 U/L (15-37) 


 


 





 


Alanine Aminotransferase


(ALT/SGPT) 


 15 U/L (14-59) 


 


 





 


Alkaline Phosphatase


 


 129 U/L


() 


 





 


Total Protein


 


 5.1 g/dL


(6.4-8.2) 


 





 


Albumin


 


 1.1 g/dL


(3.4-5.0) 


 





 


Albumin/Globulin Ratio  0.3 (1.0-1.7)   


 


Test


 7/24/20


23:05 7/25/20


04:45 7/25/20


05:48 





 


Glucose (Fingerstick)


 138 mg/dL


(70-99) 


 138 mg/dL


(70-99) 





 


Sodium Level


 


 137 mmol/L


(136-145) 


 





 


Potassium Level


 


 4.3 mmol/L


(3.5-5.1) 


 





 


Chloride Level


 


 103 mmol/L


() 


 





 


Carbon Dioxide Level


 


 27 mmol/L


(21-32) 


 





 


Anion Gap  7 (6-14)   


 


Blood Urea Nitrogen


 


 15 mg/dL


(7-20) 


 





 


Creatinine


 


 0.6 mg/dL


(0.6-1.0) 


 





 


Estimated GFR


(Cockcroft-Gault) 


 106.3 


 


 





 


Glucose Level


 


 140 mg/dL


(70-99) 


 





 


Calcium Level


 


 10.0 mg/dL


(8.5-10.1) 


 





 


Phosphorus Level


 


 4.2 mg/dL


(2.6-4.7) 


 











Laboratory Tests








Test


 7/24/20


12:24 7/24/20


17:23 7/24/20


23:05 7/25/20


04:45


 


Glucose (Fingerstick)


 156 mg/dL


(70-99) 123 mg/dL


(70-99) 138 mg/dL


(70-99) 





 


Sodium Level


 


 


 


 137 mmol/L


(136-145)


 


Potassium Level


 


 


 


 4.3 mmol/L


(3.5-5.1)


 


Chloride Level


 


 


 


 103 mmol/L


()


 


Carbon Dioxide Level


 


 


 


 27 mmol/L


(21-32)


 


Anion Gap    7 (6-14) 


 


Blood Urea Nitrogen


 


 


 


 15 mg/dL


(7-20)


 


Creatinine


 


 


 


 0.6 mg/dL


(0.6-1.0)


 


Estimated GFR


(Cockcroft-Gault) 


 


 


 106.3 





 


Glucose Level


 


 


 


 140 mg/dL


(70-99)


 


Calcium Level


 


 


 


 10.0 mg/dL


(8.5-10.1)


 


Phosphorus Level


 


 


 


 4.2 mg/dL


(2.6-4.7)


 


Test


 7/25/20


05:48 


 


 





 


Glucose (Fingerstick)


 138 mg/dL


(70-99) 


 


 











Microbiology


7/21/20 Blood Culture - Preliminary, Resulted


          NO GROWTH AFTER 3 DAYS


7/21/20 Gram Stain - Final, Resulted


          


7/21/20 Aerobic and Anaerobic Culture - Preliminary, Resulted


          


7/19/20 Gram Stain Evaluation - Final, Complete


          


7/19/20 Respiratory Culture - Final, Complete


          


7/19/20 Antimicrobic Susceptibility - Final, Complete


          


6/30/20 Gram Stain - Final, Complete


          


6/30/20 Aerobic and Anaerobic Culture - Final, Complete


          


6/30/20 Antimicrobic Susceptibility - Final, Complete


          


6/7/20 Urine Culture - Final, Complete


         


5/30/20 Gram Stain - Final, Complete


          


5/30/20 Aerobic Culture - Final, Complete


Medications





Current Medications


Sodium Chloride 1,000 ml @  1,000 mls/hr Q1H IV  Last administered on 3/16/20at 

03:00;  Start 3/16/20 at 03:00;  Stop 3/16/20 at 03:59;  Status DC


Ondansetron HCl (Zofran) 4 mg 1X  ONCE IVP  Last administered on 3/16/20at 

03:27;  Start 3/16/20 at 03:00;  Stop 3/16/20 at 03:01;  Status DC


Morphine Sulfate (Morphine Sulfate) 4 mg 1X  ONCE IV ;  Start 3/16/20 at 03:00; 

Stop 3/16/20 at 03:01;  Status Cancel


Ketorolac Tromethamine (Toradol 30mg Vial) 30 mg 1X  ONCE IV  Last administered 

on 3/16/20at 02:54;  Start 3/16/20 at 03:00;  Stop 3/16/20 at 03:01;  Status DC


Fentanyl Citrate (Fentanyl 2ml Vial) 25 mcg 1X  ONCE IVP  Last administered on 

3/16/20at 03:23;  Start 3/16/20 at 03:30;  Stop 3/16/20 at 03:31;  Status DC


Fentanyl Citrate (Fentanyl 2ml Vial) 100 mcg STK-MED ONCE .ROUTE ;  Start 

3/16/20 at 03:18;  Stop 3/16/20 at 03:18;  Status DC


Iohexol (Omnipaque 350 Mg/ml) 90 ml 1X  ONCE IV  Last administered on 3/16/20at 

03:25;  Start 3/16/20 at 03:30;  Stop 3/16/20 at 03:31;  Status DC


Info (CONTRAST GIVEN -- Rx MONITORING) 1 each PRN DAILY  PRN MC SEE COMMENTS;  

Start 3/16/20 at 03:30;  Stop 3/18/20 at 03:29;  Status DC


Hydromorphone HCl (Dilaudid) 0.5 mg 1X  ONCE IV  Last administered on 3/16/20at 

03:55;  Start 3/16/20 at 04:30;  Stop 3/16/20 at 04:32;  Status DC


Ondansetron HCl (Zofran) 4 mg PRN Q8HRS  PRN IV NAUSEA/VOMITING 1ST CHOICE;  

Start 3/16/20 at 05:00;  Stop 3/16/20 at 09:27;  Status DC


Morphine Sulfate (Morphine Sulfate) 2 mg PRN Q2HR  PRN IV SEVERE PAIN 7-10 Last 

administered on 3/17/20at 12:26;  Start 3/16/20 at 05:00;  Stop 3/17/20 at 

14:15;  Status DC


Sodium Chloride 1,000 ml @  125 mls/hr Q8H IV  Last administered on 3/16/20at 

20:56;  Start 3/16/20 at 05:00;  Stop 3/17/20 at 04:59;  Status DC


Hydromorphone HCl (Dilaudid) 0.5 mg PRN Q3HRS  PRN IV SEVERE PAIN 7-10 Last 

administered on 3/17/20at 10:06;  Start 3/16/20 at 05:00;  Stop 3/17/20 at 

12:01;  Status DC


Piperacillin Sod/ Tazobactam Sod 4.5 gm/Sodium Chloride 100 ml @  200 mls/hr 1X 

ONCE IV  Last administered on 3/16/20at 05:44;  Start 3/16/20 at 06:00;  Stop 

3/16/20 at 06:29;  Status DC


Ondansetron HCl (Zofran) 4 mg PRN Q4HRS  PRN IV NAUSEA/VOMITING 1ST CHOICE Last 

administered on 7/23/20at 20:49;  Start 3/16/20 at 09:30


Insulin Human Lispro (HumaLOG) 0-9 UNITS Q6HRS SQ  Last administered on 

7/24/20at 12:26;  Start 3/16/20 at 09:30


Dextrose (Dextrose 50%-Water Syringe) 12.5 gm PRN Q15MIN  PRN IV SEE COMMENTS;  

Start 3/16/20 at 09:30


Pantoprazole Sodium (PROTONIX VIAL for IV PUSH) 40 mg DAILYAC IVP  Last 

administered on 7/25/20at 07:56;  Start 3/16/20 at 11:30


Prochlorperazine Edisylate (Compazine) 10 mg PRN Q6HRS  PRN IV NAUSEA/VOMITING, 

2nd CHOICE Last administered on 7/23/20at 13:45;  Start 3/16/20 at 17:45


Atenolol (Tenormin) 100 mg DAILY PO ;  Start 3/17/20 at 09:00;  Stop 3/16/20 at 

20:08;  Status DC


Metoprolol Tartrate (Lopressor Vial) 2.5 mg Q6HRS IVP  Last administered on 

3/17/20at 05:51;  Start 3/16/20 at 20:15;  Stop 3/17/20 at 10:02;  Status DC


Metoprolol Tartrate (Lopressor Vial) 5 mg Q6HRS IVP  Last administered on 

3/26/20at 00:12;  Start 3/17/20 at 10:15;  Stop 3/28/20 at 08:48;  Status DC


Hydromorphone HCl (Dilaudid) 1 mg PRN Q3HRS  PRN IV SEVERE PAIN 7-10 Last 

administered on 3/23/20at 05:13;  Start 3/17/20 at 12:00;  Stop 3/31/20 at 

00:25;  Status DC


Lidocaine HCl (Buffered Lidocaine 1%) 3 ml STK-MED ONCE .ROUTE ;  Start 3/17/20 

at 12:55;  Stop 3/17/20 at 12:56;  Status DC


Albumin Human 500 ml @  125 mls/hr 1X  ONCE IV  Last administered on 3/17/20at 

14:33;  Start 3/17/20 at 14:30;  Stop 3/17/20 at 18:32;  Status DC


Norepinephrine Bitartrate 8 mg/ Dextrose 258 ml @  17.299 mls/ hr CONT  PRN IV 

PER PROTOCOL Last administered on 4/14/20at 12:48;  Start 3/17/20 at 15:30;  

Stop 4/17/20 at 09:19;  Status DC


Sodium Chloride 1,000 ml @  125 mls/hr Q8H IV  Last administered on 3/17/20at 

21:04;  Start 3/17/20 at 16:00;  Stop 3/18/20 at 02:42;  Status DC


Albumin Human 500 ml @  125 mls/hr PRN BID  PRN IV After every 2L NSS & BP < 

90mm Last administered on 6/30/20at 16:06;  Start 3/17/20 at 16:00;  Stop 7/3/20

at 09:30;  Status DC


Iohexol (Omnipaque 300 Mg/ml) 60 ml 1X  ONCE IV  Last administered on 3/17/20at 

17:20;  Start 3/17/20 at 17:00;  Stop 3/17/20 at 17:01;  Status DC


Info (CONTRAST GIVEN -- Rx MONITORING) 1 each PRN DAILY  PRN MC SEE COMMENTS;  

Start 3/17/20 at 17:00;  Stop 3/19/20 at 16:59;  Status DC


Meropenem 1 gm/ Sodium Chloride 100 ml @  200 mls/hr Q8HRS IV  Last administered

on 3/18/20at 05:45;  Start 3/17/20 at 20:00;  Stop 3/18/20 at 08:48;  Status DC


Furosemide (Lasix) 40 mg 1X  ONCE IVP  Last administered on 3/17/20at 22:12;  

Start 3/17/20 at 22:30;  Stop 3/17/20 at 22:31;  Status DC


Calcium Chloride 1000 mg/Sodium Chloride 110 ml @  220 mls/hr 1X  ONCE IV  Last 

administered on 3/17/20at 22:11;  Start 3/17/20 at 22:30;  Stop 3/17/20 at 

22:59;  Status DC


Albuterol Sulfate (Ventolin Neb Soln) 2.5 mg 1X  ONCE NEB  Last administered on 

3/18/20at 00:56;  Start 3/17/20 at 22:30;  Stop 3/17/20 at 22:31;  Status DC


Insulin Human Regular (HumuLIN R VIAL) 5 unit 1X  ONCE IV  Last administered on 

3/17/20at 22:14;  Start 3/17/20 at 22:30;  Stop 3/17/20 at 22:31;  Status DC


Magnesium Sulfate 50 ml @ 25 mls/hr 1X  ONCE IV  Last administered on 3/18/20at 

02:57;  Start 3/18/20 at 03:00;  Stop 3/18/20 at 04:59;  Status DC


Calcium Gluconate 1000 mg/Sodium Chloride 110 ml @  220 mls/hr 1X  ONCE IV  Last

administered on 3/18/20at 02:46;  Start 3/18/20 at 03:00;  Stop 3/18/20 at 

03:29;  Status DC


Sodium Chloride 1,000 ml @  200 mls/hr Q5H IV  Last administered on 3/18/20at 

02:46;  Start 3/18/20 at 03:00;  Stop 3/18/20 at 10:21;  Status DC


Calcium Gluconate 1000 mg/Sodium Chloride 110 ml @  220 mls/hr 1X  ONCE IV  Last

administered on 3/18/20at 03:21;  Start 3/18/20 at 03:30;  Stop 3/18/20 at 

03:59;  Status DC


Sodium Bicarbonate 50 meq/Sodium Chloride 1,050 ml @  75 mls/hr Q14H IV  Last 

administered on 3/22/20at 21:10;  Start 3/18/20 at 07:30;  Stop 3/23/20 at 

10:28;  Status DC


Calcium Gluconate 2000 mg/Sodium Chloride 120 ml @  220 mls/hr 1X  ONCE IV  Last

administered on 3/18/20at 09:05;  Start 3/18/20 at 07:30;  Stop 3/18/20 at 

08:02;  Status DC


Lidocaine HCl (Xylocaine-Mpf 1% 2ml Vial) 2 ml STK-MED ONCE .ROUTE ;  Start 

3/18/20 at 08:47;  Stop 3/18/20 at 08:47;  Status DC


Meropenem 500 mg/ Sodium Chloride 50 ml @  100 mls/hr Q12HR IV  Last 

administered on 3/23/20at 21:01;  Start 3/18/20 at 18:00;  Stop 3/24/20 at 

07:58;  Status DC


Lidocaine HCl (Buffered Lidocaine 1%) 3 ml STK-MED ONCE .ROUTE ;  Start 3/18/20 

at 09:46;  Stop 3/18/20 at 09:46;  Status DC


Lidocaine HCl (Buffered Lidocaine 1%) 6 ml 1X  ONCE INJ  Last administered on 

3/18/20at 10:26;  Start 3/18/20 at 10:15;  Stop 3/18/20 at 10:16;  Status DC


Info (Tpn Per Pharmacy) 1 each PRN DAILY  PRN MC SEE COMMENTS Last administered 

on 7/24/20at 11:30;  Start 3/18/20 at 12:00


Sodium Chloride 1,000 ml @  1,000 mls/hr Q1H PRN IV hypotension;  Start 3/18/20 

at 12:07;  Stop 3/18/20 at 18:06;  Status DC


Diphenhydramine HCl (Benadryl) 25 mg 1X PRN  PRN IV ITCHING;  Start 3/18/20 at 

12:15;  Stop 3/19/20 at 12:14;  Status DC


Diphenhydramine HCl (Benadryl) 25 mg 1X PRN  PRN IV ITCHING;  Start 3/18/20 at 

12:15;  Stop 3/19/20 at 12:14;  Status DC


Sodium Chloride 1,000 ml @  400 mls/hr Q2H30M PRN IV PATENCY;  Start 3/18/20 at 

12:07;  Stop 3/19/20 at 00:06;  Status DC


Info (PHARMACY MONITORING -- do not chart) 1 each PRN DAILY  PRN MC SEE 

COMMENTS;  Start 3/18/20 at 12:15;  Stop 3/20/20 at 08:13;  Status DC


Sodium Chloride 90 meq/Calcium Gluconate 10 meq/ Multivitamins 10 ml/Chromium/ C

opper/Manganese/ Seleni/Zn 1 ml/ Total Parenteral Nutrition/Amino 

Acids/Dextrose/ Fat Emulsion Intravenous 55.005 ml  @ 2.292 mls/hr TPN  CONT IV 

;  Start 3/18/20 at 22:00;  Stop 3/18/20 at 12:33;  Status DC


Info (Tpn Per Pharmacy) 1 each PRN DAILY  PRN MC SEE COMMENTS;  Start 3/18/20 at

12:30;  Status UNV


Sodium Chloride 90 meq/Calcium Gluconate 10 meq/ Multivitamins 10 ml/Chromium/ 

Copper/Manganese/ Seleni/Zn 0.5 ml/ Total Parenteral Nutrition/Amino 

Acids/Dextrose/ Fat Emulsion Intravenous 1,512 ml @  63 mls/hr TPN  CONT IV  

Last administered on 3/18/20at 22:06;  Start 3/18/20 at 22:00;  Stop 3/19/20 at 

21:59;  Status DC


Calcium Carbonate/ Glycine (Tums) 500 mg PRN AFTMEALHC  PRN PO INDIGESTION;  

Start 3/18/20 at 17:45;  Stop 5/13/20 at 10:25;  Status DC


Calcium Gluconate (Calcium Gluconate) 2,000 mg 1X  ONCE IVP  Last administered 

on 3/19/20at 02:19;  Start 3/19/20 at 02:15;  Stop 3/19/20 at 02:16;  Status DC


Calcium Chloride 3000 mg/Sodium Chloride 1,030 ml @  50 mls/hr [id number] IV  Last 

administered on 3/21/20at 02:17;  Start 3/19/20 at 08:00;  Stop 3/21/20 at 

15:23;  Status DC


Lorazepam (Ativan Inj) 1 mg PRN Q4HRS  PRN IVP ANXIETY / AGITATION, 2nd choic 

Last administered on 4/17/20at 03:51;  Start 3/19/20 at 09:00;  Stop 4/17/20 at 

09:19;  Status DC


Sodium Chloride 1,000 ml @  1,000 mls/hr Q1H PRN IV hypotension;  Start 3/19/20 

at 08:56;  Stop 3/19/20 at 14:55;  Status DC


Albumin Human 200 ml @  200 mls/hr 1X PRN  PRN IV Hypotension;  Start 3/19/20 at

09:00;  Stop 3/19/20 at 14:59;  Status DC


Diphenhydramine HCl (Benadryl) 25 mg 1X PRN  PRN IV ITCHING;  Start 3/19/20 at 

09:00;  Stop 3/20/20 at 08:59;  Status DC


Diphenhydramine HCl (Benadryl) 25 mg 1X PRN  PRN IV ITCHING;  Start 3/19/20 at 

09:00;  Stop 3/20/20 at 08:59;  Status DC


Sodium Chloride 1,000 ml @  400 mls/hr Q2H30M PRN IV PATENCY;  Start 3/19/20 at 

08:56;  Stop 3/19/20 at 20:55;  Status DC


Info (PHARMACY MONITORING -- do not chart) 1 each PRN DAILY  PRN MC SEE 

COMMENTS;  Start 3/19/20 at 09:00;  Status UNV


Info (PHARMACY MONITORING -- do not chart) 1 each PRN DAILY  PRN MC SEE 

COMMENTS;  Start 3/19/20 at 09:00;  Stop 3/20/20 at 08:13;  Status DC


Digoxin (Lanoxin) 500 mcg 1X  ONCE IV  Last administered on 3/19/20at 10:04;  

Start 3/19/20 at 10:00;  Stop 3/19/20 at 10:01;  Status DC


Digoxin (Lanoxin) 125 mcg 1X  ONCE IV  Last administered on 3/19/20at 17:10;  

Start 3/19/20 at 18:00;  Stop 3/19/20 at 18:01;  Status DC


Magnesium Sulfate 100 ml @  25 mls/hr 1X  ONCE IV  Last administered on 

3/19/20at 12:48;  Start 3/19/20 at 13:00;  Stop 3/19/20 at 16:59;  Status DC


Sodium Chloride 90 meq/Magnesium Sulfate 10 meq/ Calcium Gluconate 20 meq/ 

Multivitamins 10 ml/Chromium/ Copper/Manganese/ Seleni/Zn 0.5 ml/ Total 

Parenteral Nutrition/Amino Acids/Dextrose/ Fat Emulsion Intravenous 1,512 ml @  

63 mls/hr TPN  CONT IV  Last administered on 3/19/20at 22:25;  Start 3/19/20 at 

22:00;  Stop 3/20/20 at 21:59;  Status DC


Sodium Chloride 1,000 ml @  1,000 mls/hr Q1H PRN IV hypotension;  Start 3/20/20 

at 08:05;  Stop 3/20/20 at 14:04;  Status DC


Albumin Human 200 ml @  200 mls/hr 1X  ONCE IV  Last administered on 3/20/20at 

08:57;  Start 3/20/20 at 08:15;  Stop 3/20/20 at 09:14;  Status DC


Diphenhydramine HCl (Benadryl) 25 mg 1X PRN  PRN IV ITCHING;  Start 3/20/20 at 

08:15;  Stop 3/21/20 at 08:14;  Status DC


Diphenhydramine HCl (Benadryl) 25 mg 1X PRN  PRN IV ITCHING;  Start 3/20/20 at 

08:15;  Stop 3/21/20 at 08:14;  Status DC


Sodium Chloride 1,000 ml @  400 mls/hr Q2H30M PRN IV PATENCY;  Start 3/20/20 at 

08:05;  Stop 3/20/20 at 20:04;  Status DC


Info (PHARMACY MONITORING -- do not chart) 1 each PRN DAILY  PRN MC SEE 

COMMENTS;  Start 3/20/20 at 08:15;  Stop 3/24/20 at 07:57;  Status DC


Sodium Chloride 90 meq/Potassium Chloride 15 meq/ Potassium Phosphate 10 mmol/ 

Magnesium Sulfate 10 meq/Calcium Gluconate 20 meq/ Multivitamins 10 ml/Chromium/

Copper/Manganese/ Seleni/Zn 0.5 ml/ Total Parenteral Nutrition/Amino 

Acids/Dextrose/ Fat Emulsion Intravenous 1,512 ml @  63 mls/hr TPN  CONT IV  

Last administered on 3/20/20at 21:01;  Start 3/20/20 at 22:00;  Stop 3/21/20 at 

21:59;  Status DC


Potassium Chloride/Water 100 ml @  100 mls/hr 1X  ONCE IV  Last administered on 

3/20/20at 14:09;  Start 3/20/20 at 14:00;  Stop 3/20/20 at 14:59;  Status DC


Benzocaine (Hurricaine One) 1 spray 1X  ONCE MM  Last administered on 3/20/20at 

16:38;  Start 3/20/20 at 14:30;  Stop 3/20/20 at 14:31;  Status DC


Lidocaine HCl (Glydo (Lidocaine) Jelly) 1 thomas 1X  ONCE MM  Last administered on 

3/20/20at 16:38;  Start 3/20/20 at 14:30;  Stop 3/20/20 at 14:31;  Status DC


Linezolid/Dextrose 300 ml @  300 mls/hr Q12HR IV  Last administered on 3/26/20at

21:04;  Start 3/20/20 at 20:00;  Stop 3/27/20 at 07:50;  Status DC


Acetaminophen (Tylenol) 650 mg PRN Q6HRS  PRN PO MILD PAIN / TEMP;  Start 

3/21/20 at 03:30;  Stop 3/21/20 at 03:36;  Status DC


Acetaminophen (Tylenol) 650 mg PRN Q6HRS  PRN PEG MILD PAIN / TEMP Last 

administered on 4/16/20at 19:56;  Start 3/21/20 at 03:36;  Stop 5/13/20 at 

10:25;  Status DC


Sodium Chloride 1,000 ml @  1,000 mls/hr Q1H PRN IV hypotension;  Start 3/21/20 

at 07:50;  Stop 3/21/20 at 13:49;  Status DC


Albumin Human 200 ml @  200 mls/hr 1X PRN  PRN IV Hypotension;  Start 3/21/20 at

08:00;  Stop 3/21/20 at 13:59;  Status DC


Sodium Chloride (Normal Saline Flush) 10 ml 1X PRN  PRN IV AP catheter pack;  

Start 3/21/20 at 08:00;  Stop 3/22/20 at 07:59;  Status DC


Sodium Chloride (Normal Saline Flush) 10 ml 1X PRN  PRN IV  catheter pack;  

Start 3/21/20 at 08:00;  Stop 3/22/20 at 07:59;  Status DC


Sodium Chloride 1,000 ml @  400 mls/hr Q2H30M PRN IV PATENCY;  Start 3/21/20 at 

07:50;  Stop 3/21/20 at 19:49;  Status DC


Info (PHARMACY MONITORING -- do not chart) 1 each PRN DAILY  PRN MC SEE 

COMMENTS;  Start 3/21/20 at 08:00;  Status UNV


Info (PHARMACY MONITORING -- do not chart) 1 each PRN DAILY  PRN MC SEE 

COMMENTS;  Start 3/21/20 at 08:00;  Stop 3/23/20 at 08:25;  Status DC


Sodium Chloride 90 meq/Potassium Chloride 15 meq/ Potassium Phosphate 10 mmol/ 

Magnesium Sulfate 10 meq/Calcium Gluconate 20 meq/ Multivitamins 10 ml/Chromium/

Copper/Manganese/ Seleni/Zn 0.5 ml/ Total Parenteral Nutrition/Amino Acids/De

xtrose/ Fat Emulsion Intravenous 1,512 ml @  63 mls/hr TPN  CONT IV  Last 

administered on 3/21/20at 20:57;  Start 3/21/20 at 22:00;  Stop 3/22/20 at 

21:59;  Status DC


Sodium Chloride 90 meq/Potassium Chloride 15 meq/ Potassium Phosphate 15 mmol/ 

Magnesium Sulfate 10 meq/Calcium Gluconate 20 meq/ Multivitamins 10 ml/Chromium/

Copper/Manganese/ Seleni/Zn 0.5 ml/ Total Parenteral Nutrition/Amino 

Acids/Dextrose/ Fat Emulsion Intravenous 1,512 ml @  63 mls/hr TPN  CONT IV ;  

Start 3/22/20 at 22:00;  Stop 3/22/20 at 14:16;  Status DC


Sodium Chloride 90 meq/Potassium Chloride 15 meq/ Potassium Phosphate 15 mmol/ 

Magnesium Sulfate 10 meq/Calcium Gluconate 20 meq/ Multivitamins 10 ml/Chromium/

Copper/Manganese/ Seleni/Zn 0.5 ml/ Total Parenteral Nutrition/Amino Acids

/Dextrose/ Fat Emulsion Intravenous 1,200 ml @  50 mls/hr TPN  CONT IV ;  Start 

3/22/20 at 22:00;  Stop 3/22/20 at 14:17;  Status DC


Sodium Chloride 90 meq/Potassium Chloride 15 meq/ Potassium Phosphate 10 mmol/ 

Magnesium Sulfate 10 meq/Calcium Gluconate 20 meq/ Multivitamins 10 ml/Chromium/

Copper/Manganese/ Seleni/Zn 0.5 ml/ Total Parenteral Nutrition/Amino 

Acids/Dextrose/ Fat Emulsion Intravenous 1,200 ml @  50 mls/hr TPN  CONT IV  

Last administered on 3/22/20at 23:29;  Start 3/22/20 at 22:00;  Stop 3/23/20 at 

21:59;  Status DC


Sodium Chloride 1,000 ml @  1,000 mls/hr Q1H PRN IV hypotension;  Start 3/23/20 

at 07:28;  Stop 3/23/20 at 13:27;  Status DC


Albumin Human 200 ml @  200 mls/hr 1X  ONCE IV  Last administered on 3/23/20at 

08:51;  Start 3/23/20 at 07:30;  Stop 3/23/20 at 08:29;  Status DC


Diphenhydramine HCl (Benadryl) 25 mg 1X PRN  PRN IV ITCHING;  Start 3/23/20 at 

07:30;  Stop 3/24/20 at 07:29;  Status DC


Diphenhydramine HCl (Benadryl) 25 mg 1X PRN  PRN IV ITCHING;  Start 3/23/20 at 

07:30;  Stop 3/24/20 at 07:29;  Status DC


Sodium Chloride 1,000 ml @  400 mls/hr Q2H30M PRN IV PATENCY;  Start 3/23/20 at 

07:28;  Stop 3/23/20 at 19:27;  Status DC


Info (PHARMACY MONITORING -- do not chart) 1 each PRN DAILY  PRN MC SEE 

COMMENTS;  Start 3/23/20 at 07:30;  Stop 4/3/20 at 13:01;  Status DC


Metronidazole 100 ml @  100 mls/hr Q6HRS IV  Last administered on 4/8/20at 

06:26;  Start 3/23/20 at 08:30;  Stop 4/8/20 at 09:58;  Status DC


Micafungin Sodium 100 mg/Dextrose 100 ml @  100 mls/hr Q24H IV  Last 

administered on 4/30/20at 08:18;  Start 3/23/20 at 09:00;  Stop 4/30/20 at 

20:58;  Status DC


Propofol 0 ml @ As Directed STK-MED ONCE IV ;  Start 3/23/20 at 07:53;  Stop 

3/23/20 at 07:53;  Status DC


Etomidate (Amidate) 20 mg STK-MED ONCE IV ;  Start 3/23/20 at 07:53;  Stop 

3/23/20 at 07:54;  Status DC


Midazolam HCl (Versed) 5 mg STK-MED ONCE .ROUTE ;  Start 3/23/20 at 07:57;  Stop

3/23/20 at 07:57;  Status DC


Fentanyl Citrate 30 ml @ 0 mls/hr CONT  PRN IV SEE PROTOCOL Last administered on

4/17/20at 06:12;  Start 3/23/20 at 08:15;  Stop 4/17/20 at 09:19;  Status DC


Artificial Tears (Artificial Tears) 1 drop PRN Q1HR  PRN OU DRY EYE, 1st choice;

 Start 3/23/20 at 08:15;  Stop 4/29/20 at 05:31;  Status DC


Midazolam HCl 50 mg/Sodium Chloride 50 ml @ 0 mls/hr CONT  PRN IV SEE PROTOCOL 

Last administered on 3/26/20at 22:39;  Start 3/23/20 at 08:15;  Stop 3/28/20 at 

15:59;  Status DC


Etomidate (Amidate) 8 mg 1X  ONCE IV  Last administered on 3/23/20at 08:33;  

Start 3/23/20 at 08:30;  Stop 3/23/20 at 08:31;  Status DC


Succinylcholine Chloride (Anectine) 120 mg 1X  ONCE IV  Last administered on 

3/23/20at 08:34;  Start 3/23/20 at 08:30;  Stop 3/23/20 at 08:31;  Status DC


Midazolam HCl (Versed) 5 mg 1X  ONCE IV ;  Start 3/23/20 at 08:30;  Stop 3/23/20

at 08:31;  Status DC


Potassium Chloride 15 meq/ Bicarbonate Dialysis Soln w/ out KCl 5,007.5 ml  @ 

1,000 mls/ hr Q5H1M IV  Last administered on 3/24/20at 11:11;  Start 3/23/20 at 

12:00;  Stop 3/24/20 at 11:15;  Status DC


Potassium Chloride 15 meq/ Bicarbonate Dialysis Soln w/ out KCl 5,007.5 ml  @ 

1,000 mls/ hr Q5H1M IV  Last administered on 3/24/20at 11:12;  Start 3/23/20 at 

12:00;  Stop 3/24/20 at 11:17;  Status DC


Potassium Chloride 15 meq/ Bicarbonate Dialysis Soln w/ out KCl 5,007.5 ml  @ 

1,000 mls/ hr Q5H1M IV  Last administered on 3/24/20at 11:11;  Start 3/23/20 at 

12:00;  Stop 3/24/20 at 11:19;  Status DC


Sodium Chloride 90 meq/Potassium Chloride 15 meq/ Potassium Phosphate 10 mmol/ 

Magnesium Sulfate 10 meq/Calcium Gluconate 20 meq/ Multivitamins 10 ml/Chromium/

Copper/Manganese/ Seleni/Zn 0.5 ml/ Total Parenteral Nutrition/Amino 

Acids/Dextrose/ Fat Emulsion Intravenous 1,400 ml @  58.333 mls/ hr TPN  CONT IV

 Last administered on 3/23/20at 21:42;  Start 3/23/20 at 22:00;  Stop 3/24/20 at

21:59;  Status DC


Heparin Sodium (Porcine) (Heparin Sodium) 5,000 unit Q8HRS SQ  Last administered

on 3/28/20at 05:55;  Start 3/23/20 at 15:00;  Stop 3/28/20 at 13:28;  Status DC


Meropenem 500 mg/ Sodium Chloride 50 ml @  100 mls/hr Q6HRS IV  Last 

administered on 3/25/20at 06:00;  Start 3/24/20 at 09:00;  Stop 3/25/20 at 

07:29;  Status DC


Potassium Phosphate 20 mmol/ Sodium Chloride 106.6667 ml @  51.667 m... 1X  ONCE

IV  Last administered on 3/24/20at 11:22;  Start 3/24/20 at 10:15;  Stop 3/24/20

at 12:18;  Status DC


Acetaminophen (Tylenol Supp) 650 mg PRN Q6HRS  PRN MO MILD PAIN / TEMP > 100.3'F

Last administered on 7/22/20at 20:53;  Start 3/24/20 at 10:30


Potassium Chloride/Water 100 ml @  100 mls/hr Q1H IV  Last administered on 

3/24/20at 12:12;  Start 3/24/20 at 11:00;  Stop 3/24/20 at 12:59;  Status DC


Potassium Chloride 20 meq/ Bicarbonate Dialysis Soln w/ out KCl 5,010 ml @  

1,000 mls/hr Q5H1M IV  Last administered on 3/25/20at 08:48;  Start 3/24/20 at 

12:00;  Stop 3/25/20 at 13:03;  Status DC


Potassium Chloride 20 meq/ Bicarbonate Dialysis Soln w/ out KCl 5,010 ml @  

1,000 mls/hr Q5H1M IV  Last administered on 3/29/20at 14:52;  Start 3/24/20 at 

11:30;  Stop 3/29/20 at 19:59;  Status DC


Potassium Chloride 20 meq/ Bicarbonate Dialysis Soln w/ out KCl 5,010 ml @  

1,000 mls/hr Q5H1M IV  Last administered on 3/29/20at 14:53;  Start 3/24/20 at 

11:30;  Stop 3/29/20 at 19:59;  Status DC


Sodium Chloride 90 meq/Potassium Chloride 15 meq/ Potassium Phosphate 15 mmol/ 

Magnesium Sulfate 10 meq/Calcium Gluconate 15 meq/ Multivitamins 10 ml/Chromium/

Copper/Manganese/ Seleni/Zn 0.5 ml/ Total Parenteral Nutrition/Amino 

Acids/Dextrose/ Fat Emulsion Intravenous 1,400 ml @  58.333 mls/ hr TPN  CONT IV

 Last administered on 3/24/20at 22:17;  Start 3/24/20 at 22:00;  Stop 3/25/20 at

21:59;  Status DC


Cefepime HCl (Maxipime) 2 gm Q12HR IVP  Last administered on 4/7/20at 20:56;  

Start 3/25/20 at 09:00;  Stop 4/8/20 at 09:58;  Status DC


Daptomycin 500 mg/ Sodium Chloride 50 ml @  100 mls/hr Q48H IV  Last 

administered on 4/10/20at 09:57;  Start 3/25/20 at 08:30;  Stop 4/10/20 at 

10:07;  Status DC


Lidocaine HCl (Buffered Lidocaine 1%) 3 ml 1X  ONCE INJ  Last administered on 

3/25/20at 10:27;  Start 3/25/20 at 10:30;  Stop 3/25/20 at 10:31;  Status DC


Potassium Phosphate 20 mmol/ Sodium Chloride 106.6667 ml @  51.667 m... 1X  ONCE

IV  Last administered on 3/25/20at 12:51;  Start 3/25/20 at 13:00;  Stop 3/25/20

at 15:03;  Status DC


Sodium Chloride 90 meq/Potassium Chloride 15 meq/ Potassium Phosphate 18 mmol/ 

Magnesium Sulfate 8 meq/Calcium Gluconate 15 meq/ Multivitamins 10 ml/Chromium/ 

Copper/Manganese/ Seleni/Zn 0.5 ml/ Total Parenteral Nutrition/Amino 

Acids/Dextrose/ Fat Emulsion Intravenous 1,400 ml @  58.333 mls/ hr TPN  CONT IV

 Last administered on 3/25/20at 22:16;  Start 3/25/20 at 22:00;  Stop 3/26/20 at

21:59;  Status DC


Potassium Chloride 20 meq/ Bicarbonate Dialysis Soln w/ out KCl 5,010 ml @  

1,000 mls/hr Q5H1M IV  Last administered on 3/29/20at 14:54;  Start 3/25/20 at 

16:00;  Stop 3/29/20 at 19:59;  Status DC


Multi-Ingred Cream/Lotion/Oil/ Oint (Artificial Tears Eye Ointment) 1 thomas PRN 

Q1HR  PRN OU DRY EYE, 2nd choice Last administered on 4/13/20at 08:19;  Start 

3/25/20 at 17:30;  Stop 6/3/20 at 14:39;  Status DC


Sodium Chloride 90 meq/Potassium Chloride 15 meq/ Potassium Phosphate 18 mmol/ 

Magnesium Sulfate 8 meq/Calcium Gluconate 15 meq/ Multivitamins 10 ml/Chromium/ 

Copper/Manganese/ Seleni/Zn 0.5 ml/ Total Parenteral Nutrition/Amino Acids

/Dextrose/ Fat Emulsion Intravenous 1,400 ml @  58.333 mls/ hr TPN  CONT IV  

Last administered on 3/26/20at 22:00;  Start 3/26/20 at 22:00;  Stop 3/27/20 at 

21:59;  Status DC


Albumin Human 500 ml @  125 mls/hr 1X  ONCE IV ;  Start 3/26/20 at 14:15;  Stop 

3/26/20 at 18:14;  Status DC


Sodium Chloride 90 meq/Potassium Chloride 15 meq/ Potassium Phosphate 18 mmol/ 

Magnesium Sulfate 8 meq/Calcium Gluconate 15 meq/ Multivitamins 10 ml/Chromium/ 

Copper/Manganese/ Seleni/Zn 0.5 ml/ Insulin Human Regular 10 unit/ Total 

Parenteral Nutrition/Amino Acids/Dextrose/ Fat Emulsion Intravenous 1,400 ml @  

58.333 mls/ hr TPN  CONT IV  Last administered on 3/27/20at 21:43;  Start 

3/27/20 at 22:00;  Stop 3/28/20 at 21:59;  Status DC


Lidocaine HCl (Buffered Lidocaine 1%) 3 ml STK-MED ONCE .ROUTE ;  Start 3/25/20 

at 10:00;  Stop 3/27/20 at 13:57;  Status DC


Midazolam HCl 100 mg/Sodium Chloride 100 ml @ 7 mls/hr CONT  PRN IV SEE PROTOCOL

Last administered on 4/8/20at 15:35;  Start 3/28/20 at 16:00;  Stop 6/3/20 at 

14:38;  Status DC


Sodium Chloride 90 meq/Potassium Chloride 15 meq/ Potassium Phosphate 18 mmol/ 

Magnesium Sulfate 8 meq/Calcium Gluconate 15 meq/ Multivitamins 10 ml/Chromium/ 

Copper/Manganese/ Seleni/Zn 0.5 ml/ Insulin Human Regular 15 unit/ Total 

Parenteral Nutrition/Amino Acids/Dextrose/ Fat Emulsion Intravenous 1,400 ml @  

58.333 mls/ hr TPN  CONT IV  Last administered on 3/28/20at 20:34;  Start 

3/28/20 at 22:00;  Stop 3/29/20 at 21:59;  Status DC


Info ([facility] Electrolyte Protocol) 1 ea CONT PRN  PRN MC PER PROTOCOL;  Start 

3/29/20 at 13:15


Sodium Chloride 90 meq/Potassium Chloride 15 meq/ Potassium Phosphate 18 mmol/ 

Magnesium Sulfate 8 meq/Calcium Gluconate 15 meq/ Multivitamins 10 ml/Chromium/ 

Copper/Manganese/ Seleni/Zn 0.5 ml/ Insulin Human Regular 15 unit/ Total Pare

nteral Nutrition/Amino Acids/Dextrose/ Fat Emulsion Intravenous 1,400 ml @  

58.333 mls/ hr TPN  CONT IV  Last administered on 3/29/20at 22:05;  Start 

3/29/20 at 22:00;  Stop 3/30/20 at 21:59;  Status DC


Potassium Chloride 15 meq/ Bicarbonate Dialysis Soln w/ out KCl 5,007.5 ml  @ 

1,000 mls/ hr Q5H1M IV  Last administered on 4/1/20at 18:14;  Start 3/29/20 at 

20:00;  Stop 4/2/20 at 13:08;  Status DC


Potassium Chloride 15 meq/ Bicarbonate Dialysis Soln w/ out KCl 5,007.5 ml  @ 

1,000 mls/ hr Q5H1M IV  Last administered on 4/1/20at 18:14;  Start 3/29/20 at 

20:00;  Stop 4/2/20 at 13:08;  Status DC


Potassium Chloride 15 meq/ Bicarbonate Dialysis Soln w/ out KCl 5,007.5 ml  @ 

1,000 mls/ hr Q5H1M IV  Last administered on 4/1/20at 18:14;  Start 3/29/20 at 

20:00;  Stop 4/2/20 at 13:08;  Status DC


Iohexol (Omnipaque 240 Mg/ml) 30 ml 1X  ONCE PO  Last administered on 3/30/20at 

11:30;  Start 3/30/20 at 11:30;  Stop 3/30/20 at 11:33;  Status DC


Info (CONTRAST GIVEN -- Rx MONITORING) 1 each PRN DAILY  PRN MC SEE COMMENTS;  S

tart 3/30/20 at 11:45;  Stop 4/1/20 at 11:44;  Status DC


Sodium Chloride 90 meq/Potassium Chloride 15 meq/ Potassium Phosphate 18 mmol/ 

Magnesium Sulfate 8 meq/Calcium Gluconate 15 meq/ Multivitamins 10 ml/Chromium/ 

Copper/Manganese/ Seleni/Zn 0.5 ml/ Insulin Human Regular 15 unit/ Total 

Parenteral Nutrition/Amino Acids/Dextrose/ Fat Emulsion Intravenous 1,400 ml @  

58.333 mls/ hr TPN  CONT IV  Last administered on 3/30/20at 21:47;  Start 

3/30/20 at 22:00;  Stop 3/31/20 at 21:59;  Status DC


Sodium Chloride 90 meq/Potassium Chloride 15 meq/ Potassium Phosphate 18 mmol/ 

Magnesium Sulfate 8 meq/Calcium Gluconate 15 meq/ Multivitamins 10 ml/Chromium/ 

Copper/Manganese/ Seleni/Zn 0.5 ml/ Insulin Human Regular 20 unit/ Total 

Parenteral Nutrition/Amino Acids/Dextrose/ Fat Emulsion Intravenous 1,400 ml @  

58.333 mls/ hr TPN  CONT IV  Last administered on 3/31/20at 21:36;  Start 

3/31/20 at 22:00;  Stop 4/1/20 at 21:59;  Status DC


Alteplase, Recombinant (Cathflo For Central Catheter Clearance) 1 mg 1X  ONCE 

INT CAT  Last administered on 3/31/20at 20:03;  Start 3/31/20 at 19:30;  Stop 

3/31/20 at 19:46;  Status DC


Alteplase, Recombinant (Cathflo For Central Catheter Clearance) 1 mg 1X  ONCE 

INT CAT  Last administered on 3/31/20at 22:05;  Start 3/31/20 at 22:00;  Stop 

3/31/20 at 22:01;  Status DC


Sodium Chloride 90 meq/Potassium Chloride 15 meq/ Potassium Phosphate 18 mmol/ 

Magnesium Sulfate 8 meq/Calcium Gluconate 15 meq/ Multivitamins 10 ml/Chromium/ 

Copper/Manganese/ Seleni/Zn 0.5 ml/ Insulin Human Regular 20 unit/ Total 

Parenteral Nutrition/Amino Acids/Dextrose/ Fat Emulsion Intravenous 1,400 ml @  

58.333 mls/ hr TPN  CONT IV  Last administered on 4/1/20at 21:30;  Start 4/1/20 

at 22:00;  Stop 4/2/20 at 21:59;  Status DC


Dexmedetomidine HCl 400 mcg/ Sodium Chloride 100 ml @ 0 mls/hr CONT  PRN IV 

ANXIETY / AGITATION Last administered on 5/30/20at 12:57;  Start 4/2/20 at 

08:15;  Stop 5/30/20 at 18:31;  Status DC


Sodium Chloride 500 ml @  500 mls/hr 1X PRN  PRN IV ELEVATED BP, SEE COMMENTS;  

Start 4/2/20 at 08:15


Atropine Sulfate (ATROPINE 0.5mg SYRINGE) 0.5 mg PRN Q5MIN  PRN IV SEE COMMENTS;

 Start 4/2/20 at 08:15


Furosemide (Lasix) 20 mg 1X  ONCE IVP  Last administered on 4/2/20at 08:19;  

Start 4/2/20 at 08:15;  Stop 4/2/20 at 08:16;  Status DC


Lidocaine HCl (Buffered Lidocaine 1%) 3 ml STK-MED ONCE .ROUTE ;  Start 4/2/20 

at 08:39;  Stop 4/2/20 at 08:39;  Status DC


Lidocaine HCl (Buffered Lidocaine 1%) 6 ml 1X  ONCE INJ  Last administered on 

4/2/20at 09:05;  Start 4/2/20 at 09:00;  Stop 4/2/20 at 09:06;  Status DC


Sodium Chloride 90 meq/Potassium Chloride 15 meq/ Potassium Phosphate 18 mmol/ 

Magnesium Sulfate 8 meq/Calcium Gluconate 15 meq/ Multivitamins 10 ml/Chromium/ 

Copper/Manganese/ Seleni/Zn 0.5 ml/ Insulin Human Regular 20 unit/ Total 

Parenteral Nutrition/Amino Acids/Dextrose/ Fat Emulsion Intravenous 1,400 ml @  

58.333 mls/ hr TPN  CONT IV  Last administered on 4/2/20at 22:45;  Start 4/2/20 

at 22:00;  Stop 4/3/20 at 21:59;  Status DC


Sodium Chloride 1,000 ml @  1,000 mls/hr Q1H PRN IV hypotension;  Start 4/3/20 

at 07:30;  Stop 4/3/20 at 13:29;  Status DC


Albumin Human 200 ml @  200 mls/hr 1X PRN  PRN IV Hypotension Last administered 

on 4/3/20at 09:36;  Start 4/3/20 at 07:30;  Stop 4/3/20 at 13:29;  Status DC


Sodium Chloride (Normal Saline Flush) 10 ml 1X PRN  PRN IV AP catheter pack;  

Start 4/3/20 at 07:30;  Stop 4/3/20 at 21:29;  Status DC


Sodium Chloride (Normal Saline Flush) 10 ml 1X PRN  PRN IV  catheter pack;  

Start 4/3/20 at 07:30;  Stop 4/4/20 at 07:29;  Status DC


Sodium Chloride 1,000 ml @  400 mls/hr Q2H30M PRN IV PATENCY;  Start 4/3/20 at 

07:30;  Stop 4/3/20 at 19:29;  Status DC


Info (PHARMACY MONITORING -- do not chart) 1 each PRN DAILY  PRN MC SEE 

COMMENTS;  Start 4/3/20 at 07:30;  Stop 4/3/20 at 13:02;  Status DC


Info (PHARMACY MONITORING -- do not chart) 1 each PRN DAILY  PRN MC SEE 

COMMENTS;  Start 4/3/20 at 07:30;  Stop 4/5/20 at 12:45;  Status DC


Sodium Chloride 90 meq/Potassium Chloride 15 meq/ Potassium Phosphate 10 mmol/ 

Magnesium Sulfate 8 meq/Calcium Gluconate 15 meq/ Multivitamins 10 ml/Chromium/ 

Copper/Manganese/ Seleni/Zn 0.5 ml/ Insulin Human Regular 25 unit/ Total 

Parenteral Nutrition/Amino Acids/Dextrose/ Fat Emulsion Intravenous 1,400 ml @  

58.333 mls/ hr TPN  CONT IV  Last administered on 4/3/20at 22:19;  Start 4/3/20 

at 22:00;  Stop 4/4/20 at 21:59;  Status DC


Heparin Sodium (Porcine) (Heparin Sodium) 5,000 unit Q12HR SQ  Last administered

on 4/26/20at 08:59;  Start 4/3/20 at 21:00;  Stop 4/26/20 at 10:05;  Status DC


Ondansetron HCl (Zofran) 4 mg PRN Q6HRS  PRN IV NAUSEA/VOMITING;  Start 4/6/20 

at 07:00;  Stop 4/7/20 at 06:59;  Status DC


Fentanyl Citrate (Fentanyl 2ml Vial) 25 mcg PRN Q5MIN  PRN IV MILD PAIN 1-3;  

Start 4/6/20 at 07:00;  Stop 4/7/20 at 06:59;  Status DC


Fentanyl Citrate (Fentanyl 2ml Vial) 50 mcg PRN Q5MIN  PRN IV MODERATE TO SEVERE

PAIN;  Start 4/6/20 at 07:00;  Stop 4/7/20 at 06:59;  Status DC


Ringer's Solution 1,000 ml @  30 mls/hr Q24H IV ;  Start 4/6/20 at 07:00;  Stop 

4/6/20 at 18:59;  Status DC


Lidocaine HCl (Xylocaine-Mpf 1% 2ml Vial) 2 ml PRN 1X  PRN ID PRIOR TO IV START;

 Start 4/6/20 at 07:00;  Stop 4/7/20 at 06:59;  Status DC


Prochlorperazine Edisylate (Compazine) 5 mg PACU PRN  PRN IV NAUSEA, MRX1;  

Start 4/6/20 at 07:00;  Stop 4/7/20 at 06:59;  Status DC


Sodium Chloride 1,000 ml @  1,000 mls/hr Q1H PRN IV hypotension;  Start 4/4/20 

at 09:10;  Stop 4/4/20 at 15:09;  Status DC


Albumin Human 200 ml @  200 mls/hr 1X PRN  PRN IV Hypotension Last administered 

on 4/4/20at 10:10;  Start 4/4/20 at 09:15;  Stop 4/4/20 at 15:14;  Status DC


Sodium Chloride 1,000 ml @  400 mls/hr Q2H30M PRN IV PATENCY;  Start 4/4/20 at 

09:10;  Stop 4/4/20 at 21:09;  Status DC


Info (PHARMACY MONITORING -- do not chart) 1 each PRN DAILY  PRN MC SEE 

COMMENTS;  Start 4/4/20 at 09:15;  Stop 4/5/20 at 12:45;  Status DC


Info (PHARMACY MONITORING -- do not chart) 1 each PRN DAILY  PRN MC SEE 

COMMENTS;  Start 4/4/20 at 09:15;  Stop 4/5/20 at 12:45;  Status DC


Sodium Chloride 90 meq/Potassium Chloride 15 meq/ Potassium Phosphate 10 mmol/ 

Magnesium Sulfate 8 meq/Calcium Gluconate 15 meq/ Multivitamins 10 ml/Chromium/ 

Copper/Manganese/ Seleni/Zn 0.5 ml/ Insulin Human Regular 25 unit/ Total 

Parenteral Nutrition/Amino Acids/Dextrose/ Fat Emulsion Intravenous 1,400 ml @  

58.333 mls/ hr TPN  CONT IV  Last administered on 4/4/20at 22:10;  Start 4/4/20 

at 22:00;  Stop 4/5/20 at 21:59;  Status DC


Magnesium Sulfate 50 ml @ 25 mls/hr PRN DAILY  PRN IV for Mag < 1.7 on am labs 

Last administered on 6/18/20at 10:57;  Start 4/5/20 at 09:15


Sodium Chloride 90 meq/Potassium Chloride 15 meq/ Potassium Phosphate 10 mmol/ 

Magnesium Sulfate 8 meq/Calcium Gluconate 15 meq/ Multivitamins 10 ml/Chromium/ 

Copper/Manganese/ Seleni/Zn 0.5 ml/ Insulin Human Regular 25 unit/ Total 

Parenteral Nutrition/Amino Acids/Dextrose/ Fat Emulsion Intravenous 1,400 ml @  

58.333 mls/ hr TPN  CONT IV  Last administered on 4/5/20at 21:20;  Start 4/5/20 

at 22:00;  Stop 4/6/20 at 21:59;  Status DC


Sodium Chloride 1,000 ml @  1,000 mls/hr Q1H PRN IV hypotension;  Start 4/5/20 

at 12:23;  Stop 4/5/20 at 18:22;  Status DC


Albumin Human 200 ml @  200 mls/hr 1X  ONCE IV  Last administered on 4/5/20at 

13:34;  Start 4/5/20 at 12:30;  Stop 4/5/20 at 13:29;  Status DC


Diphenhydramine HCl (Benadryl) 25 mg 1X PRN  PRN IV ITCHING;  Start 4/5/20 at 

12:30;  Stop 4/6/20 at 12:29;  Status DC


Diphenhydramine HCl (Benadryl) 25 mg 1X PRN  PRN IV ITCHING;  Start 4/5/20 at 

12:30;  Stop 4/6/20 at 12:29;  Status DC


Info (PHARMACY MONITORING -- do not chart) 1 each PRN DAILY  PRN MC SEE 

COMMENTS;  Start 4/5/20 at 12:30;  Status Cancel


Bupivacaine HCl/ Epinephrine Bitart (Sensorcain-Epi 0.5%-1:613535 Mpf) 30 ml 

STK-MED ONCE .ROUTE  Last administered on 4/6/20at 11:44;  Start 4/6/20 at 

11:00;  Stop 4/6/20 at 11:01;  Status DC


Cellulose (Surgicel Fibrillar 1x2) 1 each STK-MED ONCE .ROUTE ;  Start 4/6/20 at

11:00;  Stop 4/6/20 at 11:01;  Status DC


Sodium Chloride 90 meq/Potassium Chloride 15 meq/ Potassium Phosphate 10 mmol/ 

Magnesium Sulfate 12 meq/Calcium Gluconate 15 meq/ Multivitamins 10 ml/Chromium/

Copper/Manganese/ Seleni/Zn 0.5 ml/ Insulin Human Regular 25 unit/ Total 

Parenteral Nutrition/Amino Acids/Dextrose/ Fat Emulsion Intravenous 1,400 ml @  

58.333 mls/ hr TPN  CONT IV  Last administered on 4/6/20at 22:24;  Start 4/6/20 

at 22:00;  Stop 4/7/20 at 21:59;  Status DC


Propofol 20 ml @ As Directed STK-MED ONCE IV ;  Start 4/6/20 at 11:07;  Stop 

4/6/20 at 11:07;  Status DC


Cellulose (Surgicel Hemostat 4x8) 1 each STK-MED ONCE .ROUTE  Last administered 

on 4/6/20at 11:44;  Start 4/6/20 at 11:55;  Stop 4/6/20 at 11:56;  Status DC


Sevoflurane (Ultane) 60 ml STK-MED ONCE IH ;  Start 4/6/20 at 12:46;  Stop 

4/6/20 at 12:46;  Status DC


Sodium Chloride 1,000 ml @  1,000 mls/hr Q1H PRN IV hypotension;  Start 4/6/20 

at 13:51;  Stop 4/6/20 at 19:50;  Status DC


Albumin Human 200 ml @  200 mls/hr 1X PRN  PRN IV Hypotension Last administered 

on 4/6/20at 14:51;  Start 4/6/20 at 14:00;  Stop 4/6/20 at 19:59;  Status DC


Diphenhydramine HCl (Benadryl) 25 mg 1X PRN  PRN IV ITCHING;  Start 4/6/20 at 

14:00;  Stop 4/7/20 at 13:59;  Status DC


Diphenhydramine HCl (Benadryl) 25 mg 1X PRN  PRN IV ITCHING;  Start 4/6/20 at 

14:00;  Stop 4/7/20 at 13:59;  Status DC


Sodium Chloride 1,000 ml @  400 mls/hr Q2H30M PRN IV PATENCY;  Start 4/6/20 at 

13:51;  Stop 4/7/20 at 01:50;  Status DC


Info (PHARMACY MONITORING -- do not chart) 1 each PRN DAILY  PRN MC SEE 

COMMENTS;  Start 4/6/20 at 14:00;  Stop 4/9/20 at 08:16;  Status DC


Heparin Sodium (Porcine) (Hep Lock Adult) 500 unit STK-MED ONCE IVP ;  Start 

4/7/20 at 09:29;  Stop 4/7/20 at 09:30;  Status DC


Sodium Chloride 1,000 ml @  1,000 mls/hr Q1H PRN IV hypotension;  Start 4/7/20 

at 10:43;  Stop 4/7/20 at 16:42;  Status DC


Sodium Chloride 1,000 ml @  400 mls/hr Q2H30M PRN IV PATENCY;  Start 4/7/20 at 1

0:43;  Stop 4/7/20 at 22:42;  Status DC


Info (PHARMACY MONITORING -- do not chart) 1 each PRN DAILY  PRN MC SEE 

COMMENTS;  Start 4/7/20 at 10:45;  Status UNV


Info (PHARMACY MONITORING -- do not chart) 1 each PRN DAILY  PRN MC SEE 

COMMENTS;  Start 4/7/20 at 10:45;  Status UNV


Sodium Chloride 90 meq/Potassium Chloride 15 meq/ Magnesium Sulfate 12 

meq/Calcium Gluconate 15 meq/ Multivitamins 10 ml/Chromium/ Copper/Manganese/ 

Seleni/Zn 0.5 ml/ Insulin Human Regular 25 unit/ Total Parenteral 

Nutrition/Amino Acids/Dextrose/ Fat Emulsion Intravenous 1,400 ml @  58.333 mls/

hr TPN  CONT IV  Last administered on 4/7/20at 22:13;  Start 4/7/20 at 22:00;  

Stop 4/8/20 at 21:59;  Status DC


Sodium Chloride 1,000 ml @  1,000 mls/hr Q1H PRN IV hypotension;  Start 4/8/20 

at 07:50;  Stop 4/8/20 at 13:49;  Status DC


Albumin Human 200 ml @  200 mls/hr 1X  ONCE IV ;  Start 4/8/20 at 08:00;  Stop 

4/8/20 at 08:53;  Status DC


Diphenhydramine HCl (Benadryl) 25 mg 1X PRN  PRN IV ITCHING;  Start 4/8/20 at 

08:00;  Stop 4/9/20 at 07:59;  Status DC


Diphenhydramine HCl (Benadryl) 25 mg 1X PRN  PRN IV ITCHING;  Start 4/8/20 at 

08:00;  Stop 4/9/20 at 07:59;  Status DC


Info (PHARMACY MONITORING -- do not chart) 1 each PRN DAILY  PRN MC SEE 

COMMENTS;  Start 4/8/20 at 08:00;  Stop 4/9/20 at 08:16;  Status DC


Albumin Human 50 ml @ 50 mls/hr 1X  ONCE IV ;  Start 4/8/20 at 08:53;  Stop 

4/8/20 at 08:56;  Status DC


Albumin Human 200 ml @  50 mls/hr PRN 1X  PRN IV HYPOTENSION Last administered 

on 4/14/20at 11:54;  Start 4/8/20 at 09:00;  Stop 5/21/20 at 11:14;  Status DC


Meropenem 500 mg/ Sodium Chloride 50 ml @  100 mls/hr Q12H IV  Last administered

on 4/28/20at 10:45;  Start 4/8/20 at 10:00;  Stop 4/28/20 at 12:37;  Status DC


Sodium Chloride 90 meq/Magnesium Sulfate 12 meq/ Calcium Gluconate 15 meq/ Mul

tivitamins 10 ml/Chromium/ Copper/Manganese/ Seleni/Zn 0.5 ml/ Insulin Human 

Regular 25 unit/ Total Parenteral Nutrition/Amino Acids/Dextrose/ Fat Emulsion 

Intravenous 1,400 ml @  58.333 mls/ hr TPN  CONT IV  Last administered on 

4/8/20at 21:41;  Start 4/8/20 at 22:00;  Stop 4/9/20 at 21:59;  Status DC


Sodium Chloride 1,000 ml @  1,000 mls/hr Q1H PRN IV hypotension;  Start 4/9/20 a

t 07:58;  Stop 4/9/20 at 13:57;  Status DC


Albumin Human 200 ml @  200 mls/hr 1X PRN  PRN IV Hypotension Last administered 

on 4/9/20at 09:30;  Start 4/9/20 at 08:00;  Stop 4/9/20 at 13:59;  Status DC


Sodium Chloride 1,000 ml @  400 mls/hr Q2H30M PRN IV PATENCY;  Start 4/9/20 at 

07:58;  Stop 4/9/20 at 19:57;  Status DC


Info (PHARMACY MONITORING -- do not chart) 1 each PRN DAILY  PRN MC SEE 

COMMENTS;  Start 4/9/20 at 08:00;  Status Cancel


Info (PHARMACY MONITORING -- do not chart) 1 each PRN DAILY  PRN MC SEE 

COMMENTS;  Start 4/9/20 at 08:15;  Status UNV


Sodium Chloride 90 meq/Potassium Phosphate 5 mmol/ Magnesium Sulfate 12 

meq/Calcium Gluconate 15 meq/ Multivitamins 10 ml/Chromium/ Copper/Manganese/ 

Seleni/Zn 0.5 ml/ Insulin Human Regular 30 unit/ Total Parenteral 

Nutrition/Amino Acids/Dextrose/ Fat Emulsion Intravenous 1,400 ml @  58.333 mls/

hr TPN  CONT IV  Last administered on 4/9/20at 22:08;  Start 4/9/20 at 22:00;  

Stop 4/10/20 at 21:59;  Status DC


Linezolid/Dextrose 300 ml @  300 mls/hr Q12HR IV  Last administered on 4/20/20at

20:40;  Start 4/10/20 at 11:00;  Stop 4/21/20 at 08:10;  Status DC


Sodium Chloride 90 meq/Potassium Phosphate 15 mmol/ Magnesium Sulfate 12 

meq/Calcium Gluconate 15 meq/ Multivitamins 10 ml/Chromium/ Copper/Manganese/ 

Seleni/Zn 0.5 ml/ Insulin Human Regular 30 unit/ Total Parenteral 

Nutrition/Amino Acids/Dextrose/ Fat Emulsion Intravenous 1,400 ml @  58.333 mls/

hr TPN  CONT IV  Last administered on 4/10/20at 21:49;  Start 4/10/20 at 22:00; 

Stop 4/11/20 at 21:59;  Status DC


Sodium Chloride 90 meq/Potassium Phosphate 15 mmol/ Magnesium Sulfate 12 

meq/Calcium Gluconate 15 meq/ Multivitamins 10 ml/Chromium/ Copper/Manganese/ 

Seleni/Zn 0.5 ml/ Insulin Human Regular 40 unit/ Total Parenteral 

Nutrition/Amino Acids/Dextrose/ Fat Emulsion Intravenous 1,400 ml @  58.333 mls/

hr TPN  CONT IV  Last administered on 4/11/20at 21:21;  Start 4/11/20 at 22:00; 

Stop 4/12/20 at 21:59;  Status DC


Sodium Chloride 1,000 ml @  1,000 mls/hr Q1H PRN IV hypotension;  Start 4/11/20 

at 13:26;  Stop 4/11/20 at 19:25;  Status DC


Albumin Human 200 ml @  200 mls/hr 1X PRN  PRN IV Hypotension Last administered 

on 4/11/20at 15:00;  Start 4/11/20 at 13:30;  Stop 4/11/20 at 19:29;  Status DC


Sodium Chloride (Normal Saline Flush) 10 ml 1X PRN  PRN IV AP catheter pack;  

Start 4/11/20 at 13:30;  Stop 4/12/20 at 13:29;  Status DC


Sodium Chloride (Normal Saline Flush) 10 ml 1X PRN  PRN IV  catheter pack;  

Start 4/11/20 at 13:30;  Stop 4/12/20 at 13:29;  Status DC


Sodium Chloride 1,000 ml @  400 mls/hr Q2H30M PRN IV PATENCY;  Start 4/11/20 at 

13:26;  Stop 4/12/20 at 01:25;  Status DC


Info (PHARMACY MONITORING -- do not chart) 1 each PRN DAILY  PRN MC SEE 

COMMENTS;  Start 4/11/20 at 13:30;  Stop 4/11/20 at 13:33;  Status DC


Info (PHARMACY MONITORING -- do not chart) 1 each PRN DAILY  PRN MC SEE 

COMMENTS;  Start 4/11/20 at 13:30;  Stop 4/11/20 at 13:34;  Status DC


Sodium Chloride 90 meq/Potassium Phosphate 19 mmol/ Magnesium Sulfate 12 

meq/Calcium Gluconate 15 meq/ Multivitamins 10 ml/Chromium/ Copper/Manganese/ 

Seleni/Zn 0.5 ml/ Insulin Human Regular 40 unit/ Total Parenteral 

Nutrition/Amino Acids/Dextrose/ Fat Emulsion Intravenous 1,400 ml @  58.333 mls/

hr TPN  CONT IV  Last administered on 4/12/20at 21:54;  Start 4/12/20 at 22:00; 

Stop 4/13/20 at 21:59;  Status DC


Sodium Chloride 1,000 ml @  1,000 mls/hr Q1H PRN IV hypotension;  Start 4/13/20 

at 09:35;  Stop 4/13/20 at 15:34;  Status DC


Albumin Human 200 ml @  200 mls/hr 1X PRN  PRN IV Hypotension;  Start 4/13/20 at

09:45;  Stop 4/13/20 at 15:44;  Status DC


Diphenhydramine HCl (Benadryl) 25 mg 1X PRN  PRN IV ITCHING;  Start 4/13/20 at 

09:45;  Stop 4/14/20 at 09:44;  Status DC


Diphenhydramine HCl (Benadryl) 25 mg 1X PRN  PRN IV ITCHING;  Start 4/13/20 at 

09:45;  Stop 4/14/20 at 09:44;  Status DC


Sodium Chloride 1,000 ml @  400 mls/hr Q2H30M PRN IV PATENCY;  Start 4/13/20 at 

09:35;  Stop 4/13/20 at 21:34;  Status DC


Info (PHARMACY MONITORING -- do not chart) 1 each PRN DAILY  PRN MC SEE 

COMMENTS;  Start 4/13/20 at 09:45;  Status Cancel


Sodium Chloride 100 meq/Potassium Phosphate 19 mmol/ Magnesium Sulfate 12 

meq/Calcium Gluconate 15 meq/ Multivitamins 10 ml/Chromium/ Copper/Manganese/ 

Seleni/Zn 0.5 ml/ Insulin Human Regular 40 unit/ Potassium Chloride 20 meq/ 

Total Parenteral Nutrition/Amino Acids/Dextrose/ Fat Emulsion Intravenous 1,400 

ml @  58.333 mls/ hr TPN  CONT IV  Last administered on 4/13/20at 22:02;  Start 

4/13/20 at 22:00;  Stop 4/14/20 at 21:59;  Status DC


Furosemide (Lasix) 40 mg 1X  ONCE IVP  Last administered on 4/13/20at 14:39;  

Start 4/13/20 at 14:30;  Stop 4/13/20 at 14:31;  Status DC


Metronidazole 100 ml @  100 mls/hr Q8HRS IV  Last administered on 4/21/20at 

06:04;  Start 4/14/20 at 10:00;  Stop 4/21/20 at 08:10;  Status DC


Sodium Chloride 1,000 ml @  1,000 mls/hr Q1H PRN IV hypotension;  Start 4/14/20 

at 08:00;  Stop 4/14/20 at 13:59;  Status DC


Albumin Human 200 ml @  200 mls/hr 1X PRN  PRN IV Hypotension;  Start 4/14/20 at

08:00;  Stop 4/14/20 at 13:59;  Status DC


Sodium Chloride 1,000 ml @  400 mls/hr Q2H30M PRN IV PATENCY;  Start 4/14/20 at 

08:00;  Stop 4/14/20 at 19:59;  Status DC


Info (PHARMACY MONITORING -- do not chart) 1 each PRN DAILY  PRN MC SEE 

COMMENTS;  Start 4/14/20 at 11:30;  Status UNV


Info (PHARMACY MONITORING -- do not chart) 1 each PRN DAILY  PRN MC SEE 

COMMENTS;  Start 4/14/20 at 11:30;  Stop 4/16/20 at 12:13;  Status DC


Sodium Chloride 100 meq/Potassium Phosphate 19 mmol/ Magnesium Sulfate 12 

meq/Calcium Gluconate 15 meq/ Multivitamins 10 ml/Chromium/ Copper/Manganese/ 

Seleni/Zn 0.5 ml/ Insulin Human Regular 40 unit/ Potassium Chloride 20 meq/ 

Total Parenteral Nutrition/Amino Acids/Dextrose/ Fat Emulsion Intravenous 1,400 

ml @  58.333 mls/ hr TPN  CONT IV  Last administered on 4/14/20at 21:52;  Start 

4/14/20 at 22:00;  Stop 4/15/20 at 21:59;  Status DC


Sodium Chloride (Normal Saline Flush) 10 ml QSHIFT  PRN IV AFTER MEDS AND BLOOD 

DRAWS;  Start 4/14/20 at 15:00;  Stop 5/12/20 at 11:27;  Status DC


Sodium Chloride (Normal Saline Flush) 10 ml PRN Q5MIN  PRN IV AFTER MEDS AND 

BLOOD DRAWS;  Start 4/14/20 at 15:00


Sodium Chloride (Normal Saline Flush) 20 ml PRN Q5MIN  PRN IV AFTER MEDS AND 

BLOOD DRAWS;  Start 4/14/20 at 15:00


Sodium Chloride 100 meq/Potassium Phosphate 19 mmol/ Magnesium Sulfate 12 

meq/Calcium Gluconate 15 meq/ Multivitamins 10 ml/Chromium/ Copper/Manganese/ 

Seleni/Zn 0.5 ml/ Insulin Human Regular 40 unit/ Potassium Chloride 20 meq/ 

Total Parenteral Nutrition/Amino Acids/Dextrose/ Fat Emulsion Intravenous 1,400 

ml @  58.333 mls/ hr TPN  CONT IV  Last administered on 4/15/20at 21:20;  Start 

4/15/20 at 22:00;  Stop 4/16/20 at 21:59;  Status DC


Lidocaine HCl (Buffered Lidocaine 1%) 3 ml STK-MED ONCE .ROUTE ;  Start 4/15/20 

at 13:16;  Stop 4/15/20 at 13:16;  Status DC


Lidocaine HCl (Buffered Lidocaine 1%) 6 ml 1X  ONCE INJ  Last administered on 

4/15/20at 13:45;  Start 4/15/20 at 13:30;  Stop 4/15/20 at 13:31;  Status DC


Albumin Human 100 ml @  100 mls/hr 1X  ONCE IV  Last administered on 4/15/20at 

15:41;  Start 4/15/20 at 15:00;  Stop 4/15/20 at 15:59;  Status DC


Albumin Human 50 ml @ 50 mls/hr 1X  ONCE IV  Last administered on 4/15/20at 15

:00;  Start 4/15/20 at 15:00;  Stop 4/15/20 at 15:59;  Status DC


Info (PHARMACY MONITORING -- do not chart) 1 each PRN DAILY  PRN MC SEE 

COMMENTS;  Start 4/16/20 at 11:30;  Status Cancel


Info (PHARMACY MONITORING -- do not chart) 1 each PRN DAILY  PRN MC SEE 

COMMENTS;  Start 4/16/20 at 11:30;  Status UNV


Sodium Chloride 100 meq/Potassium Phosphate 10 mmol/ Magnesium Sulfate 12 meq/

Calcium Gluconate 15 meq/ Multivitamins 10 ml/Chromium/ Copper/Manganese/ 

Seleni/Zn 0.5 ml/ Insulin Human Regular 35 unit/ Potassium Chloride 20 meq/ 

Total Parenteral Nutrition/Amino Acids/Dextrose/ Fat Emulsion Intravenous 1,400 

ml @  58.333 mls/ hr TPN  CONT IV  Last administered on 4/16/20at 22:10;  Start 

4/16/20 at 22:00;  Stop 4/17/20 at 21:59;  Status DC


Sodium Chloride 100 meq/Potassium Phosphate 5 mmol/ Magnesium Sulfate 12 

meq/Calcium Gluconate 15 meq/ Multivitamins 10 ml/Chromium/ Copper/Manganese/ 

Seleni/Zn 0.5 ml/ Insulin Human Regular 35 unit/ Potassium Chloride 20 meq/ 

Total Parenteral Nutrition/Amino Acids/Dextrose/ Fat Emulsion Intravenous 1,400 

ml @  58.333 mls/ hr TPN  CONT IV  Last administered on 4/17/20at 22:59;  Start 

4/17/20 at 22:00;  Stop 4/18/20 at 21:59;  Status DC


Sodium Chloride 1,000 ml @  1,000 mls/hr Q1H PRN IV hypotension;  Start 4/18/20 

at 08:27;  Stop 4/18/20 at 14:26;  Status DC


Albumin Human 200 ml @  200 mls/hr 1X PRN  PRN IV Hypotension Last administered 

on 4/18/20at 09:18;  Start 4/18/20 at 08:30;  Stop 4/18/20 at 14:29;  Status DC


Sodium Chloride 1,000 ml @  400 mls/hr Q2H30M PRN IV PATENCY;  Start 4/18/20 at 

08:27;  Stop 4/18/20 at 20:26;  Status DC


Info (PHARMACY MONITORING -- do not chart) 1 each PRN DAILY  PRN MC SEE 

COMMENTS;  Start 4/18/20 at 08:30;  Status Cancel


Info (PHARMACY MONITORING -- do not chart) 1 each PRN DAILY  PRN MC SEE COMM

ENTS;  Start 4/18/20 at 08:30;  Stop 4/26/20 at 13:10;  Status DC


Sodium Chloride 100 meq/Potassium Chloride 40 meq/ Magnesium Sulfate 15 

meq/Calcium Gluconate 15 meq/ Multivitamins 10 ml/Chromium/ Copper/Manganese/ 

Seleni/Zn 0.5 ml/ Insulin Human Regular 35 unit/ Total Parenteral 

Nutrition/Amino Acids/Dextrose/ Fat Emulsion Intravenous 1,400 ml @  58.333 mls/

hr TPN  CONT IV  Last administered on 4/18/20at 22:00;  Start 4/18/20 at 22:00; 

Stop 4/19/20 at 21:59;  Status DC


Potassium Chloride/Water 100 ml @  100 mls/hr 1X  ONCE IV  Last administered on 

4/18/20at 17:28;  Start 4/18/20 at 14:45;  Stop 4/18/20 at 15:44;  Status DC


Sodium Chloride 100 meq/Potassium Chloride 40 meq/ Magnesium Sulfate 15 

meq/Calcium Gluconate 15 meq/ Multivitamins 10 ml/Chromium/ Copper/Manganese/ 

Seleni/Zn 0.5 ml/ Insulin Human Regular 35 unit/ Total Parenteral 

Nutrition/Amino Acids/Dextrose/ Fat Emulsion Intravenous 1,400 ml @  58.333 mls/

hr TPN  CONT IV  Last administered on 4/19/20at 22:46;  Start 4/19/20 at 22:00; 

Stop 4/20/20 at 21:59;  Status DC


Sodium Chloride 100 meq/Potassium Chloride 40 meq/ Magnesium Sulfate 20 

meq/Calcium Gluconate 15 meq/ Multivitamins 10 ml/Chromium/ Copper/Manganese/ 

Seleni/Zn 0.5 ml/ Insulin Human Regular 35 unit/ Total Parenteral 

Nutrition/Amino Acids/Dextrose/ Fat Emulsion Intravenous 1,400 ml @  58.333 mls/

hr TPN  CONT IV  Last administered on 4/20/20at 22:31;  Start 4/20/20 at 22:00; 

Stop 4/21/20 at 21:59;  Status DC


Fentanyl Citrate (Fentanyl 2ml Vial) 50 mcg PRN Q2HR  PRN IVP PAIN Last admin

istered on 4/27/20at 13:32;  Start 4/20/20 at 21:00;  Stop 4/28/20 at 12:53;  

Status DC


Fentanyl Citrate (Fentanyl 2ml Vial) 25 mcg PRN Q2HR  PRN IVP PAIN;  Start 

4/20/20 at 21:00;  Stop 4/28/20 at 12:54;  Status DC


Enoxaparin Sodium (Lovenox 100mg Syringe) 100 mg Q12HR SQ ;  Start 4/21/20 at 

21:00;  Status UNV


Amino Acids/ Glycerin/ Electrolytes 1,000 ml @  75 mls/hr [id number] IV ;  Start 

4/20/20 at 21:15;  Status UNV


Sodium Chloride 1,000 ml @  1,000 mls/hr Q1H PRN IV hypotension;  Start 4/21/20 

at 07:56;  Stop 4/21/20 at 13:55;  Status DC


Albumin Human 200 ml @  200 mls/hr 1X PRN  PRN IV Hypotension Last administered 

on 4/21/20at 08:40;  Start 4/21/20 at 08:00;  Stop 4/21/20 at 13:59;  Status DC


Sodium Chloride 1,000 ml @  400 mls/hr Q2H30M PRN IV PATENCY;  Start 4/21/20 at 

07:56;  Stop 4/21/20 at 19:55;  Status DC


Info (PHARMACY MONITORING -- do not chart) 1 each PRN DAILY  PRN MC SEE 

COMMENTS;  Start 4/21/20 at 08:00;  Status UNV


Info (PHARMACY MONITORING -- do not chart) 1 each PRN DAILY  PRN MC SEE COMMDEVON

NTS;  Start 4/21/20 at 08:00;  Status UNV


Daptomycin 430 mg/ Sodium Chloride 50 ml @  100 mls/hr Q24H IV  Last 

administered on 4/21/20at 12:35;  Start 4/21/20 at 09:00;  Stop 4/21/20 at 

12:49;  Status DC


Sodium Chloride 100 meq/Potassium Chloride 40 meq/ Magnesium Sulfate 20 

meq/Calcium Gluconate 15 meq/ Multivitamins 10 ml/Chromium/ Copper/Manganese/ 

Seleni/Zn 0.5 ml/ Insulin Human Regular 35 unit/ Total Parenteral 

Nutrition/Amino Acids/Dextrose/ Fat Emulsion Intravenous 1,400 ml @  58.333 mls/

hr TPN  CONT IV  Last administered on 4/21/20at 21:26;  Start 4/21/20 at 22:00; 

Stop 4/22/20 at 21:59;  Status DC


Daptomycin 430 mg/ Sodium Chloride 50 ml @  100 mls/hr Q48H IV ;  Start 4/23/20 

at 09:00;  Stop 4/22/20 at 11:55;  Status DC


Sodium Chloride 100 meq/Potassium Chloride 40 meq/ Magnesium Sulfate 20 

meq/Calcium Gluconate 15 meq/ Multivitamins 10 ml/Chromium/ Copper/Manganese/ 

Seleni/Zn 0.5 ml/ Insulin Human Regular 35 unit/ Total Parenteral 

Nutrition/Amino Acids/Dextrose/ Fat Emulsion Intravenous 1,400 ml @  58.333 mls/

hr TPN  CONT IV  Last administered on 4/22/20at 22:27;  Start 4/22/20 at 22:00; 

Stop 4/23/20 at 21:59;  Status DC


Daptomycin 430 mg/ Sodium Chloride 50 ml @  100 mls/hr Q24H IV  Last 

administered on 4/24/20at 15:07;  Start 4/22/20 at 13:00;  Stop 4/25/20 at 

13:15;  Status DC


Sodium Chloride 100 meq/Potassium Chloride 40 meq/ Magnesium Sulfate 20 

meq/Calcium Gluconate 10 meq/ Multivitamins 10 ml/Chromium/ Copper/Manganese/ 

Seleni/Zn 0.5 ml/ Insulin Human Regular 35 unit/ Total Parenteral Nutri

tion/Amino Acids/Dextrose/ Fat Emulsion Intravenous 1,400 ml @  58.333 mls/ hr 

TPN  CONT IV  Last administered on 4/24/20at 00:06;  Start 4/23/20 at 22:00;  

Stop 4/24/20 at 21:59;  Status DC


Alteplase, Recombinant (Cathflo For Central Catheter Clearance) 1 mg 1X  ONCE 

INT CAT  Last administered on 4/24/20at 11:44;  Start 4/24/20 at 10:45;  Stop 

4/24/20 at 10:46;  Status DC


Ondansetron HCl (Zofran) 4 mg PRN Q6HRS  PRN IV NAUSEA/VOMITING;  Start 4/27/20 

at 07:00;  Stop 4/28/20 at 06:59;  Status DC


Fentanyl Citrate (Fentanyl 2ml Vial) 25 mcg PRN Q5MIN  PRN IV MILD PAIN 1-3;  

Start 4/27/20 at 07:00;  Stop 4/28/20 at 06:59;  Status DC


Fentanyl Citrate (Fentanyl 2ml Vial) 50 mcg PRN Q5MIN  PRN IV MODERATE TO SEVERE

PAIN Last administered on 4/27/20at 10:17;  Start 4/27/20 at 07:00;  Stop 

4/28/20 at 06:59;  Status DC


Ringer's Solution 1,000 ml @  30 mls/hr Q24H IV ;  Start 4/27/20 at 07:00;  Stop

4/27/20 at 18:59;  Status DC


Lidocaine HCl (Xylocaine-Mpf 1% 2ml Vial) 2 ml PRN 1X  PRN ID PRIOR TO IV START;

 Start 4/27/20 at 07:00;  Stop 4/28/20 at 06:59;  Status DC


Prochlorperazine Edisylate (Compazine) 5 mg PACU PRN  PRN IV NAUSEA, MRX1;  Sta

rt 4/27/20 at 07:00;  Stop 4/28/20 at 06:59;  Status DC


Sodium Acetate 50 meq/Potassium Acetate 55 meq/ Magnesium Sulfate 20 meq/Calcium

Gluconate 10 meq/ Multivitamins 10 ml/Chromium/ Copper/Manganese/ Seleni/Zn 0.5 

ml/ Insulin Human Regular 35 unit/ Total Parenteral Nutrition/Amino 

Acids/Dextrose/ Fat Emulsion Intravenous 1,400 ml @  58.333 mls/ hr TPN  CONT IV

;  Start 4/24/20 at 22:00;  Stop 4/24/20 at 14:15;  Status DC


Sodium Acetate 50 meq/Potassium Acetate 55 meq/ Magnesium Sulfate 20 meq/Calcium

Gluconate 10 meq/ Multivitamins 10 ml/Chromium/ Copper/Manganese/ Seleni/Zn 0.5 

ml/ Insulin Human Regular 35 unit/ Total Parenteral Nutrition/Amino 

Acids/Dextrose/ Fat Emulsion Intravenous 1,800 ml @  75 mls/hr TPN  CONT IV  

Last administered on 4/24/20at 22:38;  Start 4/24/20 at 22:00;  Stop 4/25/20 at 

21:59;  Status DC


Sodium Chloride 1,000 ml @  1,000 mls/hr Q1H PRN IV hypotension;  Start 4/24/20 

at 15:31;  Stop 4/24/20 at 21:30;  Status DC


Diphenhydramine HCl (Benadryl) 25 mg 1X PRN  PRN IV ITCHING;  Start 4/24/20 at 

15:45;  Stop 4/25/20 at 15:44;  Status DC


Diphenhydramine HCl (Benadryl) 25 mg 1X PRN  PRN IV ITCHING;  Start 4/24/20 at 

15:45;  Stop 4/25/20 at 15:44;  Status DC


Sodium Chloride 1,000 ml @  400 mls/hr Q2H30M PRN IV PATENCY;  Start 4/24/20 at 

15:31;  Stop 4/25/20 at 03:30;  Status DC


Info (PHARMACY MONITORING -- do not chart) 1 each PRN DAILY  PRN MC SEE 

COMMENTS;  Start 4/24/20 at 15:45;  Stop 5/26/20 at 14:14;  Status DC


Sodium Acetate 50 meq/Potassium Acetate 55 meq/ Magnesium Sulfate 20 meq/Calcium

Gluconate 10 meq/ Multivitamins 10 ml/Chromium/ Copper/Manganese/ Seleni/Zn 0.5 

ml/ Insulin Human Regular 35 unit/ Total Parenteral Nutrition/Amino 

Acids/Dextrose/ Fat Emulsion Intravenous 1,800 ml @  75 mls/hr TPN  CONT IV  

Last administered on 4/25/20at 22:03;  Start 4/25/20 at 22:00;  Stop 4/26/20 at 

21:59;  Status DC


Daptomycin 430 mg/ Sodium Chloride 50 ml @  100 mls/hr Q24H IV  Last 

administered on 4/30/20at 13:00;  Start 4/25/20 at 13:00;  Stop 4/30/20 at 

20:58;  Status DC


Heparin Sodium (Porcine) 1000 unit/Sodium Chloride 1,001 ml @  1,001 mls/hr 1X  

ONCE IRR ;  Start 4/27/20 at 06:00;  Stop 4/27/20 at 06:59;  Status DC


Potassium Acetate 55 meq/Magnesium Sulfate 20 meq/ Calcium Gluconate 10 meq/ 

Multivitamins 10 ml/Chromium/ Copper/Manganese/ Seleni/Zn 0.5 ml/ Insulin Human 

Regular 35 unit/ Total Parenteral Nutrition/Amino Acids/Dextrose/ Fat Emulsion 

Intravenous 1,920 ml @  80 mls/hr TPN  CONT IV  Last administered on 4/26/20at 

22:10;  Start 4/26/20 at 22:00;  Stop 4/27/20 at 21:59;  Status DC


Dexamethasone Sodium Phosphate (Decadron) 4 mg STK-MED ONCE .ROUTE ;  Start 

4/27/20 at 10:56;  Stop 4/27/20 at 10:57;  Status DC


Ondansetron HCl (Zofran) 4 mg STK-MED ONCE .ROUTE ;  Start 4/27/20 at 10:56;  

Stop 4/27/20 at 10:57;  Status DC


Rocuronium Bromide (Zemuron) 50 mg STK-MED ONCE .ROUTE ;  Start 4/27/20 at 

10:56;  Stop 4/27/20 at 10:57;  Status DC


Fentanyl Citrate (Fentanyl 2ml Vial) 100 mcg STK-MED ONCE .ROUTE ;  Start 

4/27/20 at 10:56;  Stop 4/27/20 at 10:57;  Status DC


Bupivacaine HCl/ Epinephrine Bitart (Sensorcain-Epi 0.5%-1:[id number] Mpf) 30 ml 

STK-MED ONCE .ROUTE  Last administered on 4/27/20at 12:01;  Start 4/27/20 at 

10:58;  Stop 4/27/20 at 10:58;  Status DC


Cellulose (Surgicel Hemostat 2x14) 1 each STK-MED ONCE .ROUTE ;  Start 4/27/20 

at 10:58;  Stop 4/27/20 at 10:59;  Status DC


Iohexol (Omnipaque 300 Mg/ml) 50 ml STK-MED ONCE .ROUTE ;  Start 4/27/20 at 

10:58;  Stop 4/27/20 at 10:59;  Status DC


Cellulose (Surgicel Hemostat 4x8) 1 each STK-MED ONCE .ROUTE ;  Start 4/27/20 at

10:58;  Stop 4/27/20 at 10:59;  Status DC


Bisacodyl (Dulcolax Supp) 10 mg STK-MED ONCE .ROUTE ;  Start 4/27/20 at 10:59;  

Stop 4/27/20 at 10:59;  Status DC


Heparin Sodium (Porcine) 1000 unit/Sodium Chloride 1,001 ml @  1,001 mls/hr 1X  

ONCE IRR ;  Start 4/27/20 at 12:00;  Stop 4/27/20 at 12:59;  Status DC


Propofol 20 ml @ As Directed STK-MED ONCE IV ;  Start 4/27/20 at 11:05;  Stop 

4/27/20 at 11:05;  Status DC


Sevoflurane (Ultane) 90 ml STK-MED ONCE IH ;  Start 4/27/20 at 11:05;  Stop 

4/27/20 at 11:05;  Status DC


Sevoflurane (Ultane) 60 ml STK-MED ONCE IH ;  Start 4/27/20 at 12:26;  Stop 

4/27/20 at 12:27;  Status DC


Propofol 20 ml @ As Directed STK-MED ONCE IV ;  Start 4/27/20 at 12:26;  Stop 

4/27/20 at 12:27;  Status DC


Phenylephrine HCl (PHENYLEPHRINE in 0.9% NACL PF) 1 mg STK-MED ONCE IV ;  Start 

4/27/20 at 12:34;  Stop 4/27/20 at 12:34;  Status DC


Heparin Sodium (Porcine) (Heparin Sodium) 5,000 unit Q12HR SQ  Last administered

on 5/6/20at 20:57;  Start 4/27/20 at 21:00;  Stop 5/7/20 at 09:59;  Status DC


Sodium Chloride (Normal Saline Flush) 3 ml QSHIFT  PRN IV AFTER MEDS AND BLOOD 

DRAWS;  Start 4/27/20 at 13:45;  Status Cancel


Naloxone HCl (Narcan) 0.4 mg PRN Q2MIN  PRN IV SEE INSTRUCTIONS Last 

administered on 6/6/20at 15:15;  Start 4/27/20 at 13:45;  Stop 7/1/20 at 16:00; 

Status DC


Sodium Chloride 1,000 ml @  25 mls/hr Q24H IV  Last administered on 5/26/20at 

13:37;  Start 4/27/20 at 13:37;  Stop 5/29/20 at 13:09;  Status DC


Naloxone HCl (Narcan) 0.4 mg PRN Q2MIN  PRN IV SEE INSTRUCTIONS;  Start 4/27/20 

at 14:30;  Status UNV


Sodium Chloride 1,000 ml @  25 mls/hr Q24H IV ;  Start 4/27/20 at 14:30;  Status

UNV


Hydromorphone HCl 30 ml @ 0 mls/hr CONT PRN  PRN IV PER PROTOCOL Last 

administered on 5/2/20at 16:08;  Start 4/27/20 at 14:30;  Stop 5/4/20 at 08:55; 

Status DC


Potassium Acetate 55 meq/Magnesium Sulfate 20 meq/ Calcium Gluconate 10 meq/ 

Multivitamins 10 ml/Chromium/ Copper/Manganese/ Seleni/Zn 0.5 ml/ Insulin Human 

Regular 35 unit/ Total Parenteral Nutrition/Amino Acids/Dextrose/ Fat Emulsion 

Intravenous 1,920 ml @  80 mls/hr TPN  CONT IV  Last administered on 4/27/20at 

22:01;  Start 4/27/20 at 22:00;  Stop 4/28/20 at 21:59;  Status DC


Bumetanide (Bumex) 2 mg [id number] IV  Last administered on 5/1/20at 13:50;  Start 

4/28/20 at 14:00;  Stop 5/2/20 at 14:10;  Status DC


Meropenem 1 gm/ Sodium Chloride 100 ml @  200 mls/hr Q8HRS IV  Last administered

on 5/22/20at 05:53;  Start 4/28/20 at 14:00;  Stop 5/22/20 at 09:31;  Status DC


Potassium Acetate 55 meq/Magnesium Sulfate 20 meq/ Calcium Gluconate 10 meq/ 

Multivitamins 10 ml/Chromium/ Copper/Manganese/ Seleni/Zn 0.5 ml/ Insulin Human 

Regular 35 unit/ Total Parenteral Nutrition/Amino Acids/Dextrose/ Fat Emulsion 

Intravenous 1,920 ml @  80 mls/hr TPN  CONT IV  Last administered on 4/28/20at 

22:02;  Start 4/28/20 at 22:00;  Stop 4/29/20 at 21:59;  Status DC


Hydromorphone HCl (Dilaudid Standard PCA) 12 mg STK-MED ONCE IV ;  Start 4/27/20

at 14:35;  Stop 4/28/20 at 13:53;  Status DC


Artificial Tears (Artificial Tears) 1 drop PRN Q15MIN  PRN OU DRY EYE Last 

administered on 6/23/20at 21:17;  Start 4/29/20 at 05:30


Hydromorphone HCl (Dilaudid Standard PCA) 12 mg STK-MED ONCE IV ;  Start 4/28/20

at 12:05;  Stop 4/29/20 at 09:15;  Status DC


Potassium Acetate 65 meq/Magnesium Sulfate 20 meq/ Calcium Gluconate 10 meq/ 

Multivitamins 10 ml/Chromium/ Copper/Manganese/ Seleni/Zn 0.5 ml/ Insulin Human 

Regular 30 unit/ Total Parenteral Nutrition/Amino Acids/Dextrose/ Fat Emulsion 

Intravenous 1,920 ml @  80 mls/hr TPN  CONT IV  Last administered on 4/29/20at 

22:22;  Start 4/29/20 at 22:00;  Stop 4/30/20 at 21:59;  Status DC


Cyclobenzaprine HCl (Flexeril) 10 mg PRN Q6HRS  PRN PO MUSCLE SPASMS Last 

administered on 7/10/20at 19:12;  Start 4/30/20 at 10:45


Potassium Acetate 55 meq/Magnesium Sulfate 20 meq/ Calcium Gluconate 10 meq/ 

Multivitamins 10 ml/Chromium/ Copper/Manganese/ Seleni/Zn 0.5 ml/ Insulin Human 

Regular 30 unit/ Total Parenteral Nutrition/Amino Acids/Dextrose/ Fat Emulsion 

Intravenous 1,920 ml @  80 mls/hr TPN  CONT IV  Last administered on 5/1/20at 

01:00;  Start 4/30/20 at 22:00;  Stop 5/1/20 at 21:59;  Status DC


Magnesium Sulfate 50 ml @ 25 mls/hr 1X  ONCE IV  Last administered on 4/30/20at 

17:18;  Start 4/30/20 at 12:45;  Stop 4/30/20 at 14:44;  Status DC


Potassium Chloride/Water 100 ml @  100 mls/hr 1X  ONCE IV  Last administered on 

5/1/20at 11:27;  Start 5/1/20 at 12:00;  Stop 5/1/20 at 12:59;  Status DC


Hydromorphone HCl (Dilaudid Standard PCA) 12 mg STK-MED ONCE IV ;  Start 4/29/20

at 10:50;  Stop 5/1/20 at 11:02;  Status DC


Hydromorphone HCl (Dilaudid Standard PCA) 12 mg STK-MED ONCE IV ;  Start 4/30/20

at 13:47;  Stop 5/1/20 at 11:03;  Status DC


Potassium Acetate 30 meq/Magnesium Sulfate 20 meq/ Calcium Gluconate 10 meq/ 

Multivitamins 10 ml/Chromium/ Copper/Manganese/ Seleni/Zn 0.5 ml/ Insulin Human 

Regular 30 unit/ Potassium Chloride 30 meq/ Total Parenteral Nutrition/Amino 

Acids/Dextrose/ Fat Emulsion Intravenous 1,920 ml @  80 mls/hr TPN  CONT IV  

Last administered on 5/1/20at 22:34;  Start 5/1/20 at 22:00;  Stop 5/2/20 at 

21:59;  Status DC


Potassium Chloride/Water 100 ml @  100 mls/hr Q1H IV  Last administered on 

5/2/20at 13:05;  Start 5/2/20 at 07:00;  Stop 5/2/20 at 10:59;  Status DC


Magnesium Sulfate 50 ml @ 25 mls/hr 1X  ONCE IV  Last administered on 5/2/20at 

10:34;  Start 5/2/20 at 10:30;  Stop 5/2/20 at 12:29;  Status DC


Potassium Chloride 75 meq/ Magnesium Sulfate 20 meq/Calcium Gluconate 10 meq/ 

Multivitamins 10 ml/Chromium/ Copper/Manganese/ Seleni/Zn 0.5 ml/ Insulin Human 

Regular 30 unit/ Total Parenteral Nutrition/Amino Acids/Dextrose/ Fat Emulsion 

Intravenous 1,920 ml @  80 mls/hr TPN  CONT IV  Last administered on 5/2/20at 

21:51;  Start 5/2/20 at 22:00;  Stop 5/3/20 at 22:00;  Status DC


Potassium Chloride 75 meq/ Magnesium Sulfate 20 meq/Calcium Gluconate 10 meq/ 

Multivitamins 10 ml/Chromium/ Copper/Manganese/ Seleni/Zn 0.5 ml/ Insulin Human 

Regular 25 unit/ Total Parenteral Nutrition/Amino Acids/Dextrose/ Fat Emulsion 

Intravenous 1,920 ml @  80 mls/hr TPN  CONT IV  Last administered on 5/3/20at 

22:04;  Start 5/3/20 at 22:00;  Stop 5/4/20 at 21:59;  Status DC


Hydromorphone HCl (Dilaudid) 0.4 mg PRN Q4HRS  PRN IVP PAIN Last administered on

5/4/20at 10:57;  Start 5/4/20 at 09:00;  Stop 5/4/20 at 18:59;  Status DC


Micafungin Sodium 100 mg/Dextrose 100 ml @  100 mls/hr Q24H IV  Last 

administered on 5/26/20at 12:17;  Start 5/4/20 at 11:00;  Stop 5/27/20 at 09:59;

 Status DC


Daptomycin 485 mg/ Sodium Chloride 50 ml @  100 mls/hr Q24H IV  Last 

administered on 5/11/20at 13:10;  Start 5/4/20 at 11:00;  Stop 5/12/20 at 07:44;

 Status DC


Potassium Chloride 75 meq/ Magnesium Sulfate 15 meq/Calcium Gluconate 8 meq/ 

Multivitamins 10 ml/Chromium/ Copper/Manganese/ Seleni/Zn 0.5 ml/ Insulin Human 

Regular 25 unit/ Total Parenteral Nutrition/Amino Acids/Dextrose/ Fat Emulsion 

Intravenous 1,920 ml @  80 mls/hr TPN  CONT IV  Last administered on 5/4/20at 

23:08;  Start 5/4/20 at 22:00;  Stop 5/5/20 at 21:59;  Status DC


Haloperidol Lactate (Haldol Inj) 3 mg 1X  ONCE IVP  Last administered on 

5/4/20at 14:37;  Start 5/4/20 at 14:30;  Stop 5/4/20 at 14:31;  Status DC


Hydromorphone HCl (Dilaudid) 1 mg PRN Q4HRS  PRN IVP PAIN Last administered on 

5/18/20at 06:25;  Start 5/4/20 at 19:00;  Stop 5/18/20 at 17:10;  Status DC


Potassium Chloride 75 meq/ Magnesium Sulfate 15 meq/Calcium Gluconate 8 meq/ 

Multivitamins 10 ml/Chromium/ Copper/Manganese/ Seleni/Zn 0.5 ml/ Insulin Human 

Regular 20 unit/ Total Parenteral Nutrition/Amino Acids/Dextrose/ Fat Emulsion 

Intravenous 1,920 ml @  80 mls/hr TPN  CONT IV  Last administered on 5/5/20at 

22:10;  Start 5/5/20 at 22:00;  Stop 5/6/20 at 21:59;  Status DC


Lidocaine HCl (Buffered Lidocaine 1%) 3 ml STK-MED ONCE .ROUTE ;  Start 5/6/20 

at 11:31;  Stop 5/6/20 at 11:31;  Status DC


Lidocaine HCl (Buffered Lidocaine 1%) 3 ml STK-MED ONCE .ROUTE ;  Start 5/6/20 

at 12:28;  Stop 5/6/20 at 12:29;  Status DC


Lidocaine HCl (Buffered Lidocaine 1%) 6 ml 1X  ONCE INJ  Last administered on 

5/6/20at 12:53;  Start 5/6/20 at 12:45;  Stop 5/6/20 at 12:46;  Status DC


Potassium Chloride 75 meq/ Magnesium Sulfate 15 meq/Calcium Gluconate 8 meq/ 

Multivitamins 10 ml/Chromium/ Copper/Manganese/ Seleni/Zn 0.5 ml/ Insulin Human 

Regular 20 unit/ Total Parenteral Nutrition/Amino Acids/Dextrose/ Fat Emulsion 

Intravenous 1,920 ml @  80 mls/hr TPN  CONT IV  Last administered on 5/6/20at 

22:00;  Start 5/6/20 at 22:00;  Stop 5/7/20 at 21:59;  Status DC


Potassium Chloride 75 meq/ Magnesium Sulfate 15 meq/Calcium Gluconate 8 meq/ 

Multivitamins 10 ml/Chromium/ Copper/Manganese/ Seleni/Zn 0.5 ml/ Insulin Human 

Regular 15 unit/ Total Parenteral Nutrition/Amino Acids/Dextrose/ Fat Emulsion 

Intravenous 1,920 ml @  80 mls/hr TPN  CONT IV  Last administered on 5/7/20at 

22:28;  Start 5/7/20 at 22:00;  Stop 5/8/20 at 21:59;  Status DC


Vecuronium Bromide (Norcuron Bolus) 6 mg PRN Q6HRS  PRN IV VENT ASYNCHRONY;  

Start 5/7/20 at 19:15;  Stop 5/7/20 at 19:35;  Status DC


Bumetanide (Bumex) 2 mg 1X  ONCE IV  Last administered on 5/7/20at 22:09;  Start

5/7/20 at 19:45;  Stop 5/7/20 at 19:46;  Status DC


Lidocaine HCl (Buffered Lidocaine 1%) 3 ml STK-MED ONCE .ROUTE ;  Start 5/8/20 

at 07:59;  Stop 5/8/20 at 07:59;  Status DC


Midazolam HCl (Versed) 5 mg STK-MED ONCE .ROUTE ;  Start 5/8/20 at 08:36;  Stop 

5/8/20 at 08:36;  Status DC


Fentanyl Citrate (Fentanyl 5ml Vial) 250 mcg STK-MED ONCE .ROUTE ;  Start 5/8/20

at 08:36;  Stop 5/8/20 at 08:37;  Status DC


Lidocaine HCl (Buffered Lidocaine 1%) 3 ml 1X  ONCE IJ  Last administered on 

5/8/20at 09:30;  Start 5/8/20 at 09:15;  Stop 5/8/20 at 09:16;  Status DC


Midazolam HCl (Versed) 5 mg 1X  ONCE IV  Last administered on 5/8/20at 09:30;  

Start 5/8/20 at 09:15;  Stop 5/8/20 at 09:16;  Status DC


Fentanyl Citrate (Fentanyl 5ml Vial) 250 mcg 1X  ONCE IV  Last administered on 

5/8/20at 09:30;  Start 5/8/20 at 09:15;  Stop 5/8/20 at 09:16;  Status DC


Bumetanide (Bumex) 2 mg DAILY IV  Last administered on 5/18/20at 08:07;  Start 

5/8/20 at 10:00;  Stop 5/18/20 at 17:15;  Status DC


Potassium Chloride 75 meq/ Magnesium Sulfate 15 meq/ Multivitamins 10 

ml/Chromium/ Copper/Manganese/ Seleni/Zn 0.5 ml/ Insulin Human Regular 15 unit/ 

Total Parenteral Nutrition/Amino Acids/Dextrose/ Fat Emulsion Intravenous 1,920 

ml @  80 mls/hr TPN  CONT IV  Last administered on 5/8/20at 21:59;  Start 5/8/20

at 22:00;  Stop 5/9/20 at 21:59;  Status DC


Metoclopramide HCl (Reglan Vial) 10 mg PRN Q3HRS  PRN IVP NAUSEA/VOMITING-3rd 

choice Last administered on 5/14/20at 04:25;  Start 5/9/20 at 16:45


Potassium Chloride 75 meq/ Magnesium Sulfate 15 meq/ Multivitamins 10 

ml/Chromium/ Copper/Manganese/ Seleni/Zn 0.5 ml/ Insulin Human Regular 15 unit/ 

Total Parenteral Nutrition/Amino Acids/Dextrose/ Fat Emulsion Intravenous 1,920 

ml @  80 mls/hr TPN  CONT IV  Last administered on 5/9/20at 22:41;  Start 5/9/20

at 22:00;  Stop 5/10/20 at 21:59;  Status DC


Magnesium Sulfate 50 ml @ 25 mls/hr 1X  ONCE IV  Last administered on 5/10/20at 

10:44;  Start 5/10/20 at 09:00;  Stop 5/10/20 at 10:59;  Status DC


Potassium Chloride/Water 100 ml @  100 mls/hr 1X  ONCE IV  Last administered on 

5/10/20at 09:37;  Start 5/10/20 at 09:00;  Stop 5/10/20 at 09:59;  Status DC


Duloxetine HCl (Cymbalta) 30 mg DAILY PO  Last administered on 5/11/20at 09:48; 

Start 5/10/20 at 14:00;  Stop 5/13/20 at 10:25;  Status DC


Potassium Chloride 80 meq/ Magnesium Sulfate 20 meq/ Multivitamins 10 ml/Chromi

um/ Copper/Manganese/ Seleni/Zn 0.5 ml/ Insulin Human Regular 15 unit/ Total 

Parenteral Nutrition/Amino Acids/Dextrose/ Fat Emulsion Intravenous 1,920 ml @  

80 mls/hr TPN  CONT IV  Last administered on 5/10/20at 21:42;  Start 5/10/20 at 

22:00;  Stop 5/11/20 at 21:59;  Status DC


Potassium Chloride 80 meq/ Magnesium Sulfate 20 meq/ Multivitamins 10 

ml/Chromium/ Copper/Manganese/ Seleni/Zn 0.5 ml/ Insulin Human Regular 15 unit/ 

Total Parenteral Nutrition/Amino Acids/Dextrose/ Fat Emulsion Intravenous 1,920 

ml @  80 mls/hr TPN  CONT IV  Last administered on 5/11/20at 22:20;  Start 

5/11/20 at 22:00;  Stop 5/12/20 at 21:59;  Status DC


Lidocaine HCl (Buffered Lidocaine 1%) 3 ml STK-MED ONCE .ROUTE ;  Start 5/12/20 

at 09:54;  Stop 5/12/20 at 09:55;  Status DC


Hydromorphone HCl (Dilaudid Standard PCA) 12 mg STK-MED ONCE IV ;  Start 5/1/20 

at 15:50;  Stop 5/12/20 at 11:24;  Status DC


Potassium Chloride 80 meq/ Magnesium Sulfate 20 meq/ Multivitamins 10 

ml/Chromium/ Copper/Manganese/ Seleni/Zn 0.5 ml/ Insulin Human Regular 15 unit/ 

Total Parenteral Nutrition/Amino Acids/Dextrose/ Fat Emulsion Intravenous 1,920 

ml @  80 mls/hr TPN  CONT IV  Last administered on 5/12/20at 21:40;  Start 

5/12/20 at 22:00;  Stop 5/13/20 at 21:59;  Status DC


Lidocaine HCl (Buffered Lidocaine 1%) 6 ml 1X  ONCE INJ  Last administered on 

5/12/20at 14:15;  Start 5/12/20 at 14:15;  Stop 5/12/20 at 14:16;  Status DC


Potassium Chloride 80 meq/ Magnesium Sulfate 20 meq/ Multivitamins 10 

ml/Chromium/ Copper/Manganese/ Seleni/Zn 1 ml/ Insulin Human Regular 15 unit/ 

Total Parenteral Nutrition/Amino Acids/Dextrose/ Fat Emulsion Intravenous 1,920 

ml @  80 mls/hr TPN  CONT IV  Last administered on 5/13/20at 22:04;  Start 

5/13/20 at 22:00;  Stop 5/14/20 at 21:59;  Status DC


Potassium Chloride/Water 100 ml @  100 mls/hr 1X  ONCE IV  Last administered on 

5/14/20at 11:34;  Start 5/14/20 at 11:00;  Stop 5/14/20 at 11:59;  Status DC


Potassium Chloride 90 meq/ Magnesium Sulfate 20 meq/ Multivitamins 10 

ml/Chromium/ Copper/Manganese/ Seleni/Zn 1 ml/ Insulin Human Regular 15 unit/ 

Total Parenteral Nutrition/Amino Acids/Dextrose/ Fat Emulsion Intravenous 1,920 

ml @  80 mls/hr TPN  CONT IV  Last administered on 5/14/20at 22:57;  Start 

5/14/20 at 22:00;  Stop 5/15/20 at 21:59;  Status DC


Potassium Chloride 90 meq/ Magnesium Sulfate 20 meq/ Multivitamins 10 

ml/Chromium/ Copper/Manganese/ Seleni/Zn 1 ml/ Insulin Human Regular 15 unit/ 

Total Parenteral Nutrition/Amino Acids/Dextrose/ Fat Emulsion Intravenous 1,920 

ml @  80 mls/hr TPN  CONT IV  Last administered on 5/15/20at 22:48;  Start 

5/15/20 at 22:00;  Stop 5/16/20 at 21:59;  Status DC


Potassium Chloride 90 meq/ Magnesium Sulfate 20 meq/ Multivitamins 10 

ml/Chromium/ Copper/Manganese/ Seleni/Zn 1 ml/ Insulin Human Regular 15 unit/ 

Total Parenteral Nutrition/Amino Acids/Dextrose/ Fat Emulsion Intravenous 1,890 

ml @  78.75 mls/ hr TPN  CONT IV  Last administered on 5/16/20at 22:15;  Start 

5/16/20 at 22:00;  Stop 5/17/20 at 21:59;  Status DC


Linezolid/Dextrose 300 ml @  300 mls/hr Q12HR IV  Last administered on 5/19/20at

21:08;  Start 5/17/20 at 09:00;  Stop 5/20/20 at 08:11;  Status DC


Daptomycin 450 mg/ Sodium Chloride 50 ml @  100 mls/hr Q24H IV  Last 

administered on 5/20/20at 09:25;  Start 5/17/20 at 09:00;  Stop 5/21/20 at 

08:30;  Status DC


Potassium Chloride 90 meq/ Magnesium Sulfate 20 meq/ Multivitamins 10 

ml/Chromium/ Copper/Manganese/ Seleni/Zn 1 ml/ Insulin Human Regular 15 unit/ 

Total Parenteral Nutrition/Amino Acids/Dextrose/ Fat Emulsion Intravenous 1,890 

ml @  78.75 mls/ hr TPN  CONT IV  Last administered on 5/17/20at 21:34;  Start 

5/17/20 at 22:00;  Stop 5/18/20 at 21:59;  Status DC


Lorazepam (Ativan Inj) 2 mg STK-MED ONCE .ROUTE ;  Start 5/17/20 at 14:58;  Stop

5/17/20 at 14:58;  Status DC


Metoprolol Tartrate (Lopressor Vial) 5 mg 1X  ONCE IVP  Last administered on 

5/17/20at 15:31;  Start 5/17/20 at 15:15;  Stop 5/17/20 at 15:16;  Status DC


Lorazepam (Ativan Inj) 2 mg 1X  ONCE IVP  Last administered on 5/17/20at 15:30; 

Start 5/17/20 at 15:15;  Stop 5/17/20 at 15:16;  Status DC


Enoxaparin Sodium (Lovenox 40mg Syringe) 40 mg Q24H SQ  Last administered on 

6/5/20at 17:44;  Start 5/17/20 at 17:00;  Stop 6/7/20 at 06:50;  Status DC


Lorazepam (Ativan Inj) 1 mg PRN Q4HRS  PRN IVP ANXIETY / AGITATION MILD-MOD Last

administered on 5/31/20at 15:55;  Start 5/17/20 at 19:15;  Stop 6/2/20 at 11:45;

 Status DC


Lorazepam (Ativan Inj) 2 mg PRN Q4HRS  PRN IVP ANXIETY / AGITATION SEVERE Last 

administered on 6/1/20at 07:55;  Start 5/17/20 at 19:15;  Stop 6/2/20 at 11:45; 

Status DC


Fentanyl Citrate (Fentanyl 2ml Vial) 50 mcg PRN Q4HRS  PRN IVP SEVERE PAIN Last 

administered on 6/13/20at 05:15;  Start 5/18/20 at 13:15;  Stop 6/14/20 at 

09:29;  Status DC


Fentanyl Citrate (Fentanyl 2ml Vial) 25 mcg PRN Q4HRS  PRN IVP MODERATE PAIN 

Last administered on 6/13/20at 00:27;  Start 5/18/20 at 13:15;  Stop 6/14/20 at 

09:30;  Status DC


Potassium Chloride 90 meq/ Magnesium Sulfate 20 meq/ Multivitamins 10 

ml/Chromium/ Copper/Manganese/ Seleni/Zn 1 ml/ Insulin Human Regular 15 unit/ 

Total Parenteral Nutrition/Amino Acids/Dextrose/ Fat Emulsion Intravenous 1,890 

ml @  78.75 mls/ hr TPN  CONT IV  Last administered on 5/18/20at 22:18;  Start 

5/18/20 at 22:00;  Stop 5/19/20 at 21:59;  Status DC


Furosemide (Lasix) 40 mg 1X  ONCE IVP  Last administered on 5/18/20at 21:51;  

Start 5/18/20 at 21:45;  Stop 5/18/20 at 21:48;  Status DC


Albumin Human 100 ml @  100 mls/hr 1X PRN  PRN IV SEE COMMENTS;  Start 5/19/20 

at 01:30


Furosemide (Lasix) 40 mg [id number] IVP  Last administered on 6/3/20at 08:04;  Start 

5/19/20 at 14:00;  Stop 6/3/20 at 13:07;  Status DC


Potassium Chloride 90 meq/ Magnesium Sulfate 20 meq/ Multivitamins 10 

ml/Chromium/ Copper/Manganese/ Seleni/Zn 1 ml/ Insulin Human Regular 15 unit/ 

Total Parenteral Nutrition/Amino Acids/Dextrose/ Fat Emulsion Intravenous 1,800 

ml @  75 mls/hr TPN  CONT IV  Last administered on 5/19/20at 22:31;  Start 

5/19/20 at 22:00;  Stop 5/20/20 at 21:59;  Status DC


Potassium Chloride 90 meq/ Magnesium Sulfate 20 meq/ Multivitamins 10 

ml/Chromium/ Copper/Manganese/ Seleni/Zn 1 ml/ Insulin Human Regular 15 unit/ 

Total Parenteral Nutrition/Amino Acids/Dextrose/ Fat Emulsion Intravenous 1,800 

ml @  75 mls/hr TPN  CONT IV  Last administered on 5/20/20at 22:28;  Start 

5/20/20 at 22:00;  Stop 5/21/20 at 21:59;  Status DC


Potassium Chloride 110 meq/ Magnesium Sulfate 20 meq/ Multivitamins 10 

ml/Chromium/ Copper/Manganese/ Seleni/Zn 1 ml/ Insulin Human Regular 15 unit/ 

Total Parenteral Nutrition/Amino Acids/Dextrose/ Fat Emulsion Intravenous 1,800 

ml @  75 mls/hr TPN  CONT IV  Last administered on 5/21/20at 22:01;  Start 

5/21/20 at 22:00;  Stop 5/22/20 at 21:59;  Status DC


Saliva Substitute (Biotene Moisturizing Mouth) 2 spray PRN Q15MIN  PRN PO DRY 

MOUTH;  Start 5/21/20 at 11:00


Potassium Chloride 110 meq/ Magnesium Sulfate 20 meq/ Multivitamins 10 

ml/Chromium/ Copper/Manganese/ Seleni/Zn 1 ml/ Insulin Human Regular 15 unit/ 

Total Parenteral Nutrition/Amino Acids/Dextrose/ Fat Emulsion Intravenous 1,800 

ml @  75 mls/hr TPN  CONT IV  Last administered on 5/22/20at 22:21;  Start 

5/22/20 at 22:00;  Stop 5/23/20 at 21:59;  Status DC


Potassium Chloride 110 meq/ Magnesium Sulfate 20 meq/ Multivitamins 10 

ml/Chromium/ Copper/Manganese/ Seleni/Zn 1 ml/ Insulin Human Regular 15 unit/ 

Total Parenteral Nutrition/Amino Acids/Dextrose/ Fat Emulsion Intravenous 1,800 

ml @  75 mls/hr TPN  CONT IV  Last administered on 5/23/20at 22:04;  Start 

5/23/20 at 22:00;  Stop 5/24/20 at 21:59;  Status DC


Potassium Chloride 110 meq/ Magnesium Sulfate 20 meq/ Multivitamins 10 

ml/Chromium/ Copper/Manganese/ Seleni/Zn 1 ml/ Insulin Human Regular 15 unit/ 

Total Parenteral Nutrition/Amino Acids/Dextrose/ Fat Emulsion Intravenous 1,800 

ml @  75 mls/hr TPN  CONT IV  Last administered on 5/24/20at 22:48;  Start 

5/24/20 at 22:00;  Stop 5/25/20 at 21:59;  Status DC


Potassium Chloride 70 meq/ Magnesium Sulfate 20 meq/ Multivitamins 10 

ml/Chromium/ Copper/Manganese/ Seleni/Zn 1 ml/ Insulin Human Regular 15 unit/ 

Total Parenteral Nutrition/Amino Acids/Dextrose/ Fat Emulsion Intravenous 1,800 

ml @  75 mls/hr TPN  CONT IV  Last administered on 5/25/20at 21:39;  Start 

5/25/20 at 22:00;  Stop 5/26/20 at 21:59;  Status DC


Meropenem 500 mg/ Sodium Chloride 50 ml @  100 mls/hr Q6HRS IV  Last 

administered on 5/27/20at 06:02;  Start 5/25/20 at 18:00;  Stop 5/27/20 at 

09:59;  Status DC


Barium Sulfate (Varibar Thin Liquid Apple) 148 gm 1X  ONCE PO ;  Start 5/26/20 

at 11:45;  Stop 5/26/20 at 11:49;  Status DC


Potassium Chloride 70 meq/ Magnesium Sulfate 20 meq/ Multivitamins 10 

ml/Chromium/ Copper/Manganese/ Seleni/Zn 1 ml/ Insulin Human Regular 15 unit/ 

Total Parenteral Nutrition/Amino Acids/Dextrose/ Fat Emulsion Intravenous 1,800 

ml @  75 mls/hr TPN  CONT IV  Last administered on 5/26/20at 22:27;  Start 

5/26/20 at 22:00;  Stop 5/27/20 at 21:59;  Status DC


Piperacillin Sod/ Tazobactam Sod 3.375 gm/Sodium Chloride 50 ml @  100 mls/hr 

Q6HRS IV  Last administered on 6/4/20at 06:10;  Start 5/27/20 at 12:00;  Stop 

6/4/20 at 07:26;  Status DC


Potassium Chloride 70 meq/ Magnesium Sulfate 20 meq/ Multivitamins 10 

ml/Chromium/ Copper/Manganese/ Seleni/Zn 1 ml/ Insulin Human Regular 15 unit/ 

Total Parenteral Nutrition/Amino Acids/Dextrose/ Fat Emulsion Intravenous 1,800 

ml @  75 mls/hr TPN  CONT IV  Last administered on 5/27/20at 22:03;  Start 

5/27/20 at 22:00;  Stop 5/28/20 at 21:59;  Status DC


Potassium Chloride 70 meq/ Magnesium Sulfate 20 meq/ Multivitamins 10 

ml/Chromium/ Copper/Manganese/ Seleni/Zn 1 ml/ Insulin Human Regular 15 unit/ 

Total Parenteral Nutrition/Amino Acids/Dextrose/ Fat Emulsion Intravenous 1,800 

ml @  75 mls/hr TPN  CONT IV  Last administered on 5/28/20at 22:33;  Start 

5/28/20 at 22:00;  Stop 5/29/20 at 21:59;  Status DC


Potassium Chloride 70 meq/ Magnesium Sulfate 20 meq/ Multivitamins 10 

ml/Chromium/ Copper/Manganese/ Seleni/Zn 1 ml/ Insulin Human Regular 15 unit/ 

Total Parenteral Nutrition/Amino Acids/Dextrose/ Fat Emulsion Intravenous 1,800 

ml @  75 mls/hr TPN  CONT IV  Last administered on 5/29/20at 23:13;  Start 

5/29/20 at 22:00;  Stop 5/30/20 at 21:59;  Status DC


Potassium Chloride 80 meq/ Magnesium Sulfate 20 meq/ Multivitamins 10 

ml/Chromium/ Copper/Manganese/ Seleni/Zn 1 ml/ Insulin Human Regular 15 unit/ 

Total Parenteral Nutrition/Amino Acids/Dextrose/ Fat Emulsion Intravenous 1,800 

ml @  75 mls/hr TPN  CONT IV  Last administered on 5/30/20at 22:30;  Start 

5/30/20 at 22:00;  Stop 5/31/20 at 21:59;  Status DC


Potassium Chloride 80 meq/ Magnesium Sulfate 20 meq/ Multivitamins 10 

ml/Chromium/ Copper/Manganese/ Seleni/Zn 1 ml/ Insulin Human Regular 15 unit/ To

[place]ly Parenteral Nutrition/Amino Acids/Dextrose/ Fat Emulsion Intravenous 1,800 ml

@  75 mls/hr TPN  CONT IV  Last administered on 5/31/20at 21:54;  Start 5/31/20 

at 22:00;  Stop 6/1/20 at 21:59;  Status DC


Potassium Chloride/Water 100 ml @  100 mls/hr 1X  ONCE IV  Last administered on 

6/1/20at 10:15;  Start 6/1/20 at 10:00;  Stop 6/1/20 at 10:59;  Status DC


Potassium Chloride 90 meq/ Magnesium Sulfate 20 meq/ Multivitamins 10 

ml/Chromium/ Copper/Manganese/ Seleni/Zn 1 ml/ Insulin Human Regular 20 unit/ 

Total Parenteral Nutrition/Amino Acids/Dextrose/ Fat Emulsion Intravenous 1,800 

ml @  75 mls/hr TPN  CONT IV  Last administered on 6/1/20at 22:28;  Start 6/1/20

at 22:00;  Stop 6/2/20 at 21:59;  Status DC


Potassium Chloride 90 meq/ Magnesium Sulfate 20 meq/ Multivitamins 10 

ml/Chromium/ Copper/Manganese/ Seleni/Zn 1 ml/ Insulin Human Regular 20 unit/ 

Total Parenteral Nutrition/Amino Acids/Dextrose/ Fat Emulsion Intravenous 1,800 

ml @  75 mls/hr TPN  CONT IV  Last administered on 6/2/20at 22:08;  Start 6/2/20

at 22:00;  Stop 6/3/20 at 21:59;  Status DC


Lorazepam (Ativan Inj) 0.25 mg PRN Q4HRS  PRN IVP ANXIETY / AGITATION Last 

administered on 7/20/20at 03:28;  Start 6/3/20 at 07:30


Potassium Chloride 90 meq/ Magnesium Sulfate 20 meq/ Multivitamins 10 

ml/Chromium/ Copper/Manganese/ Seleni/Zn 1 ml/ Insulin Human Regular 20 unit/ 

Total Parenteral Nutrition/Amino Acids/Dextrose/ Fat Emulsion Intravenous 1,800 

ml @  75 mls/hr TPN  CONT IV  Last administered on 6/3/20at 23:13;  Start 6/3/20

at 22:00;  Stop 6/4/20 at 21:59;  Status DC


Furosemide (Lasix) 40 mg DAILY IVP  Last administered on 6/5/20at 11:14;  Start 

6/3/20 at 13:30;  Stop 6/7/20 at 09:12;  Status DC


Fluoxetine HCl (PROzac) 20 mg QHS PEG  Last administered on 7/24/20at 21:24;  

Start 6/4/20 at 21:00


Fentanyl (Duragesic 50mcg/ Hr Patch) 1 patch Q72H TD  Last administered on 

6/4/20at 21:22;  Start 6/4/20 at 21:00;  Stop 6/13/20 at 12:00;  Status DC


Potassium Chloride 40 meq/ Potassium Acetate 60 meq/Magnesium Sulfate 10 meq/ 

Multivitamins 10 ml/Chromium/ Copper/Manganese/ Seleni/Zn 1 ml/ Insulin Human 

Regular 20 unit/ Total Parenteral Nutrition/Amino Acids/Dextrose/ Fat Emulsion 

Intravenous 1,800 ml @  75 mls/hr TPN  CONT IV  Last administered on 6/5/20at 

00:03;  Start 6/4/20 at 22:00;  Stop 6/5/20 at 21:59;  Status DC


Potassium Acetate 80 meq/Magnesium Sulfate 5 meq/ Multivitamins 10 ml/Chromium/ 

Copper/Manganese/ Seleni/Zn 1 ml/ Insulin Human Regular 20 unit/ Total 

Parenteral Nutrition/Amino Acids/Dextrose/ Fat Emulsion Intravenous 1,920 ml @  

80 mls/hr TPN  CONT IV  Last administered on 6/5/20at 21:59;  Start 6/5/20 at 

22:00;  Stop 6/6/20 at 21:59;  Status DC


Potassium Acetate 60 meq/Magnesium Sulfate 5 meq/ Multivitamins 10 ml/Chromium/ 

Copper/Manganese/ Seleni/Zn 1 ml/ Insulin Human Regular 30 unit/ Total Lisa

ral Nutrition/Amino Acids/Dextrose/ Fat Emulsion Intravenous 1,920 ml @  80 

mls/hr TPN  CONT IV  Last administered on 6/6/20at 21:54;  Start 6/6/20 at 

22:00;  Stop 6/7/20 at 21:59;  Status DC


Norepinephrine Bitartrate 8 mg/ Dextrose 258 ml @  13.332 mls/ hr CONT  PRN IV 

PER PROTOCOL Last administered on 7/2/20at 09:09;  Start 6/7/20 at 06:30


Albumin Human 500 ml @  125 mls/hr 1X  ONCE IV  Last administered on 6/7/20at 

08:10;  Start 6/7/20 at 08:15;  Stop 6/7/20 at 12:14;  Status DC


Potassium Acetate 40 meq/Magnesium Sulfate 5 meq/ Multivitamins 10 ml/Chromium/ 

Copper/Manganese/ Seleni/Zn 1 ml/ Insulin Human Regular 30 unit/ Total Pa

renteral Nutrition/Amino Acids/Dextrose/ Fat Emulsion Intravenous 1,920 ml @  80

mls/hr TPN  CONT IV  Last administered on 6/7/20at 22:23;  Start 6/7/20 at 

22:00;  Stop 6/8/20 at 21:59;  Status DC


Meropenem 1 gm/ Sodium Chloride 100 ml @  200 mls/hr Q8HRS IV ;  Start 6/7/20 at

14:00;  Status Cancel


Meropenem 1 gm/ Sodium Chloride 100 ml @  200 mls/hr Q8HRS IV  Last administered

on 6/7/20at 11:04;  Start 6/7/20 at 10:00;  Stop 6/7/20 at 13:00;  Status DC


Meropenem 1 gm/ Sodium Chloride 100 ml @  200 mls/hr Q12HR IV  Last administered

on 6/25/20at 08:27;  Start 6/7/20 at 21:00;  Stop 6/25/20 at 08:56;  Status DC


Sodium Chloride 1,000 ml @  1,000 mls/hr 1X  ONCE IV  Last administered on 

6/7/20at 11:06;  Start 6/7/20 at 10:45;  Stop 6/7/20 at 11:44;  Status DC


Micafungin Sodium 100 mg/Dextrose 100 ml @  100 mls/hr Q24H IV  Last 

administered on 6/24/20at 12:34;  Start 6/7/20 at 11:00;  Stop 6/25/20 at 08:56;

 Status DC


Daptomycin 410 mg/ Sodium Chloride 50 ml @  100 mls/hr Q24H IV  Last 

administered on 6/9/20at 13:33;  Start 6/7/20 at 14:00;  Stop 6/10/20 at 08:30; 

Status DC


Midazolam HCl (Versed) 2 mg STK-MED ONCE .ROUTE ;  Start 6/7/20 at 14:47;  Stop 

6/7/20 at 14:48;  Status DC


Fentanyl Citrate (Fentanyl 2ml Vial) 100 mcg STK-MED ONCE .ROUTE ;  Start 6/7/20

at 14:47;  Stop 6/7/20 at 14:48;  Status DC


Flumazenil (Romazicon) 0.5 mg STK-MED ONCE IV ;  Start 6/7/20 at 14:48;  Stop 

6/7/20 at 14:48;  Status DC


Naloxone HCl (Narcan) 0.4 mg STK-MED ONCE .ROUTE ;  Start 6/7/20 at 14:48;  Stop

6/7/20 at 14:48;  Status DC


Lidocaine HCl (Lidocaine 1% 20ml Vial) 20 ml STK-MED ONCE .ROUTE ;  Start 6/7/20

at 14:48;  Stop 6/7/20 at 14:48;  Status DC


Midazolam HCl (Versed) 2 mg 1X  ONCE IV  Last administered on 6/7/20at 15:28;  

Start 6/7/20 at 15:00;  Stop 6/7/20 at 15:01;  Status DC


Fentanyl Citrate (Fentanyl 2ml Vial) 100 mcg 1X  ONCE IV  Last administered on 

6/7/20at 15:28;  Start 6/7/20 at 15:00;  Stop 6/7/20 at 15:01;  Status DC


Lidocaine HCl (Lidocaine 1% 20ml Vial) 20 ml 1X  ONCE INJ  Last administered on 

6/7/20at 15:30;  Start 6/7/20 at 15:00;  Stop 6/7/20 at 15:01;  Status DC


Sodium Chloride 1,000 ml @  100 mls/hr Q10H IV  Last administered on 6/16/20at 

07:30;  Start 6/7/20 at 20:00;  Stop 6/16/20 at 11:26;  Status DC


Sodium Bicarbonate (Sodium Bicarb Adult 8.4% Syr) 50 meq 1X  ONCE IV  Last 

administered on 6/7/20at 21:47;  Start 6/7/20 at 22:00;  Stop 6/7/20 at 22:01;  

Status DC


Potassium Acetate 40 meq/Magnesium Sulfate 5 meq/ Multivitamins 10 ml/Chromium/ 

Copper/Manganese/ Seleni/Zn 1 ml/ Insulin Human Regular 30 unit/ Total 

Parenteral Nutrition/Amino Acids/Dextrose/ Fat Emulsion Intravenous 1,920 ml @  

80 mls/hr TPN  CONT IV  Last administered on 6/8/20at 22:28;  Start 6/8/20 at 

22:00;  Stop 6/9/20 at 21:59;  Status DC


Sodium Chloride 500 ml @  500 mls/hr 1X  ONCE IV  Last administered on 6/9/20at 

06:39;  Start 6/9/20 at 06:45;  Stop 6/9/20 at 07:44;  Status DC


Potassium Acetate 40 meq/Magnesium Sulfate 5 meq/ Multivitamins 10 ml/Chromium/ 

Copper/Manganese/ Seleni/Zn 1 ml/ Insulin Human Regular 30 unit/ Total 

Parenteral Nutrition/Amino Acids/Dextrose/ Fat Emulsion Intravenous 1,920 ml @  

80 mls/hr TPN  CONT IV  Last administered on 6/9/20at 22:03;  Start 6/9/20 at 

22:00;  Stop 6/10/20 at 21:59;  Status DC


Metoprolol Tartrate (Lopressor Vial) 5 mg PRN Q6HRS  PRN IVP HYPERTENSION Last 

administered on 7/25/20at 03:08;  Start 6/10/20 at 09:00


Potassium Acetate 40 meq/Magnesium Sulfate 5 meq/ Multivitamins 10 ml/Chromium/ 

Copper/Manganese/ Seleni/Zn 1 ml/ Insulin Human Regular 30 unit/ Total 

Parenteral Nutrition/Amino Acids/Dextrose/ Fat Emulsion Intravenous 1,920 ml @  

80 mls/hr TPN  CONT IV  Last administered on 6/10/20at 21:26;  Start 6/10/20 at 

22:00;  Stop 6/11/20 at 21:59;  Status DC


Potassium Acetate 40 meq/Magnesium Sulfate 5 meq/ Multivitamins 10 ml/Chromium/ 

Copper/Manganese/ Seleni/Zn 1 ml/ Insulin Human Regular 30 unit/ Total 

Parenteral Nutrition/Amino Acids/Dextrose/ Fat Emulsion Intravenous 1,920 ml @  

80 mls/hr TPN  CONT IV  Last administered on 6/11/20at 23:23;  Start 6/11/20 at 

22:00;  Stop 6/12/20 at 21:59;  Status DC


Potassium Acetate 40 meq/Magnesium Sulfate 5 meq/ Multivitamins 10 ml/Chromium/ 

Copper/Manganese/ Seleni/Zn 1 ml/ Insulin Human Regular 30 unit/ Total 

Parenteral Nutrition/Amino Acids/Dextrose/ Fat Emulsion Intravenous 1,920 ml @  

80 mls/hr TPN  CONT IV  Last administered on 6/12/20at 21:35;  Start 6/12/20 at 

22:00;  Stop 6/13/20 at 21:59;  Status DC


Furosemide (Lasix) 20 mg 1X  ONCE IVP  Last administered on 6/13/20at 06:26;  

Start 6/13/20 at 06:15;  Stop 6/13/20 at 06:16;  Status DC


Methylprednisolone Sodium Succinate (SOLU-Medrol 125MG VIAL) 125 mg 1X  ONCE IV 

Last administered on 6/13/20at 06:26;  Start 6/13/20 at 06:15;  Stop 6/13/20 at 

06:16;  Status DC


Albuterol/ Ipratropium (Duoneb) 3 ml Q4HRS NEB  Last administered on 7/25/20at 

08:51;  Start 6/13/20 at 08:00


Fentanyl Citrate 30 ml @ 0 mls/hr CONT  PRN IV SEE PROTOCOL Last administered on

7/4/20at 08:03;  Start 6/13/20 at 06:00;  Stop 7/4/20 at 12:42;  Status DC


Propofol 100 ml @ 0 mls/hr CONT  PRN IV SEE PROTOCOL Last administered on 

6/20/20at 23:50;  Start 6/13/20 at 06:00


Fentanyl Citrate (Fentanyl 2ml Vial) 25 mcg PRN Q1HR  PRN IV SEE COMMENTS Last 

administered on 7/23/20at 22:32;  Start 6/13/20 at 06:00


Fentanyl Citrate (Fentanyl 2ml Vial) 50 mcg PRN Q1HR  PRN IV SEE COMMENTS Last 

administered on 7/25/20at 04:07;  Start 6/13/20 at 06:00


Chlorhexidine Gluconate (Peridex) 15 ml BID MM ;  Start 6/13/20 at 09:00;  Stop 

6/13/20 at 07:58;  Status DC


Potassium Acetate 40 meq/Magnesium Sulfate 5 meq/ Multivitamins 10 ml/Chromium/ 

Copper/Manganese/ Seleni/Zn 1 ml/ Insulin Human Regular 30 unit/ Total 

Parenteral Nutrition/Amino Acids/Dextrose/ Fat Emulsion Intravenous 1,920 ml @  

80 mls/hr TPN  CONT IV  Last administered on 6/13/20at 21:19;  Start 6/13/20 at 

22:00;  Stop 6/14/20 at 21:59;  Status DC


Acetylcysteine (Mucomyst 20% Resp Treatment) 600 mg BID NEB  Last administered 

on 6/19/20at 09:33;  Start 6/13/20 at 21:00;  Stop 6/19/20 at 10:39;  Status DC


Magnesium Sulfate 100 ml @  25 mls/hr 1X  ONCE IV  Last administered on 

6/13/20at 15:48;  Start 6/13/20 at 15:45;  Stop 6/13/20 at 19:44;  Status DC


Potassium Acetate 40 meq/Magnesium Sulfate 5 meq/ Multivitamins 10 ml/Chromium/ 

Copper/Manganese/ Seleni/Zn 1 ml/ Insulin Human Regular 30 unit/ Total 

Parenteral Nutrition/Amino Acids/Dextrose/ Fat Emulsion Intravenous 1,920 ml @  

80 mls/hr TPN  CONT IV  Last administered on 6/14/20at 21:35;  Start 6/14/20 at 

22:00;  Stop 6/15/20 at 21:59;  Status DC


Potassium Chloride/Water 100 ml @  100 mls/hr Q1H IV  Last administered on 

6/15/20at 08:31;  Start 6/15/20 at 07:00;  Stop 6/15/20 at 08:59;  Status DC


Potassium Acetate 40 meq/Magnesium Sulfate 5 meq/ Multivitamins 10 ml/Chromium/ 

Copper/Manganese/ Seleni/Zn 1 ml/ Insulin Human Regular 30 unit/ Total 

Parenteral Nutrition/Amino Acids/Dextrose/ Fat Emulsion Intravenous 1,920 ml @  

80 mls/hr TPN  CONT IV  Last administered on 6/15/20at 21:54;  Start 6/15/20 at 

22:00;  Stop 6/16/20 at 19:34;  Status DC


Lidocaine HCl (Buffered Lidocaine 1%) 3 ml STK-MED ONCE .ROUTE ;  Start 6/15/20 

at 12:14;  Stop 6/15/20 at 12:14;  Status DC


Lidocaine HCl (Buffered Lidocaine 1%) 3 ml 1X  ONCE IJ  Last administered on 

6/15/20at 13:11;  Start 6/15/20 at 13:00;  Stop 6/15/20 at 13:01;  Status DC


Magnesium Sulfate 50 ml @ 25 mls/hr 1X  ONCE IV ;  Start 6/16/20 at 08:15;  Stop

6/16/20 at 10:14;  Status DC


Potassium Acetate 40 meq/Magnesium Sulfate 10 meq/ Multivitamins 10 ml/Chromium/

Copper/Manganese/ Seleni/Zn 1 ml/ Insulin Human Regular 20 unit/ Total 

Parenteral Nutrition/Amino Acids/Dextrose/ Fat Emulsion Intravenous 1,920 ml @  

80 mls/hr TPN  CONT IV  Last administered on 6/16/20at 21:32;  Start 6/16/20 at 

22:00;  Stop 6/17/20 at 21:59;  Status DC


Potassium Chloride/Water 100 ml @  100 mls/hr Q1H IV  Last administered on 

6/17/20at 09:12;  Start 6/17/20 at 08:00;  Stop 6/17/20 at 09:59;  Status DC


Alteplase, Recombinant (Cathflo For Central Catheter Clearance) 4 mg 1X  ONCE 

INT CAT ;  Start 6/17/20 at 09:15;  Stop 6/17/20 at 09:16;  Status UNV


Alteplase, Recombinant (Cathflo For Central Catheter Clearance) 4 mg 1X  ONCE 

INT CAT ;  Start 6/17/20 at 09:15;  Stop 6/17/20 at 09:16;  Status UNV


Alteplase, Recombinant (Cathflo For Central Catheter Clearance) 4 mg 1X  ONCE 

INT CAT ;  Start 6/17/20 at 09:15;  Stop 6/17/20 at 09:16;  Status UNV


Alteplase, Recombinant 4 mg/ Sodium Chloride 20 ml @ 20 mls/hr 1X  ONCE IV  Last

administered on 6/17/20at 10:10;  Start 6/17/20 at 10:00;  Stop 6/17/20 at 

10:59;  Status DC


Alteplase, Recombinant 4 mg/ Sodium Chloride 20 ml @ 20 mls/hr 1X  ONCE IV  Last

administered on 6/17/20at 10:09;  Start 6/17/20 at 10:00;  Stop 6/17/20 at 

10:59;  Status DC


Alteplase, Recombinant 4 mg/ Sodium Chloride 20 ml @ 20 mls/hr 1X  ONCE IV  Last

administered on 6/17/20at 10:09;  Start 6/17/20 at 10:00;  Stop 6/17/20 at 

10:59;  Status DC


Potassium Acetate 60 meq/Magnesium Sulfate 10 meq/ Multivitamins 10 ml/Chromium/

Copper/Manganese/ Seleni/Zn 1 ml/ Insulin Human Regular 20 unit/ Total 

Parenteral Nutrition/Amino Acids/Dextrose/ Fat Emulsion Intravenous 1,920 ml @  

80 mls/hr TPN  CONT IV  Last administered on 6/17/20at 21:55;  Start 6/17/20 at 

22:00;  Stop 6/18/20 at 21:59;  Status DC


Albumin Human 500 ml @  125 mls/hr 1X  ONCE IV  Last administered on 6/18/20at 

12:01;  Start 6/18/20 at 11:15;  Stop 6/18/20 at 15:14;  Status DC


Sodium Chloride 500 ml @  500 mls/hr 1X  ONCE IV  Last administered on 6/18/20at

13:50;  Start 6/18/20 at 11:15;  Stop 6/18/20 at 12:14;  Status DC


Potassium Acetate 60 meq/Magnesium Sulfate 14 meq/ Multivitamins 10 ml/Chromium/

Copper/Manganese/ Seleni/Zn 1 ml/ Insulin Human Regular 20 unit/ Total 

Parenteral Nutrition/Amino Acids/Dextrose/ Fat Emulsion Intravenous 1,920 ml @  

80 mls/hr TPN  CONT IV  Last administered on 6/18/20at 22:26;  Start 6/18/20 at 

22:00;  Stop 6/19/20 at 21:59;  Status DC


Ciprofloxacin/ Dextrose 200 ml @  200 mls/hr Q12HR IV  Last administered on 

6/25/20at 08:27;  Start 6/18/20 at 21:00;  Stop 6/25/20 at 08:56;  Status DC


Albumin Human 250 ml @  62.5 mls/hr 1X  ONCE IV  Last administered on 6/19/20at 

11:09;  Start 6/19/20 at 11:00;  Stop 6/19/20 at 14:59;  Status DC


Furosemide (Lasix) 20 mg 1X  ONCE IVP  Last administered on 6/19/20at 14:52;  

Start 6/19/20 at 10:45;  Stop 6/19/20 at 10:49;  Status DC


Potassium Acetate 60 meq/Magnesium Sulfate 14 meq/ Multivitamins 10 ml/Chromium/

Copper/Manganese/ Seleni/Zn 1 ml/ Insulin Human Regular 15 unit/ Total 

Parenteral Nutrition/Amino Acids/Dextrose/ Fat Emulsion Intravenous 1,920 ml @  

80 mls/hr TPN  CONT IV  Last administered on 6/19/20at 22:08;  Start 6/19/20 at 

22:00;  Stop 6/20/20 at 21:59;  Status DC


Potassium Acetate 60 meq/Magnesium Sulfate 14 meq/ Multivitamins 10 ml/Chromium/

Copper/Manganese/ Seleni/Zn 1 ml/ Insulin Human Regular 15 unit/ Total 

Parenteral Nutrition/Amino Acids/Dextrose/ Fat Emulsion Intravenous 1,920 ml @  

80 mls/hr TPN  CONT IV  Last administered on 6/20/20at 22:12;  Start 6/20/20 at 

22:00;  Stop 6/21/20 at 21:59;  Status DC


Potassium Acetate 60 meq/Magnesium Sulfate 14 meq/ Multivitamins 10 ml/Chromium/

Copper/Manganese/ Seleni/Zn 1 ml/ Insulin Human Regular 15 unit/ Total 

Parenteral Nutrition/Amino Acids/Dextrose/ Fat Emulsion Intravenous 1,920 ml @  

80 mls/hr TPN  CONT IV  Last administered on 6/21/20at 22:22;  Start 6/21/20 at 

22:00;  Stop 6/22/20 at 21:59;  Status DC


Furosemide (Lasix) 20 mg 1X  ONCE IVP  Last administered on 6/22/20at 11:07;  

Start 6/22/20 at 10:30;  Stop 6/22/20 at 10:34;  Status DC


Potassium Acetate 60 meq/Magnesium Sulfate 14 meq/ Multivitamins 10 ml/Chromium/

Copper/Manganese/ Seleni/Zn 1 ml/ Insulin Human Regular 15 unit/ Sodium Chloride

20 meq/Total Parenteral Nutrition/Amino Acids/Dextrose/ Fat Emulsion Intravenous

1,920 ml @  80 mls/hr TPN  CONT IV  Last administered on 6/22/20at 21:54;  Start

6/22/20 at 22:00;  Stop 6/23/20 at 21:59;  Status DC


Potassium Acetate 30 meq/Magnesium Sulfate 14 meq/ Multivitamins 10 ml/Chromium/

Copper/Manganese/ Seleni/Zn 1 ml/ Insulin Human Regular 15 unit/ Sodium Chloride

20 meq/Potassium Chloride 30 meq/ Total Parenteral Nutrition/Amino 

Acids/Dextrose/ Fat Emulsion Intravenous 1,920 ml @  80 mls/hr TPN  CONT IV  

Last administered on 6/23/20at 21:46;  Start 6/23/20 at 22:00;  Stop 6/24/20 at 

21:59;  Status DC


Sodium Chloride 80 meq/Potassium Chloride 30 meq/ Potassium Acetate 30 

meq/Magnesium Sulfate 14 meq/ Multivitamins 10 ml/Chromium/ Copper/Manganese/ 

Seleni/Zn 1 ml/ Insulin Human Regular 15 unit/ Total Parenteral Nutrition/Amino 

Acids/Dextrose/ Fat Emulsion Intravenous 1,920 ml @  80 mls/hr TPN  CONT IV  

Last administered on 6/24/20at 22:33;  Start 6/24/20 at 22:00;  Stop 6/25/20 at 

21:59;  Status DC


Furosemide (Lasix) 40 mg 1X  ONCE IVP  Last administered on 6/24/20at 16:27;  

Start 6/24/20 at 15:30;  Stop 6/24/20 at 15:33;  Status DC


Albumin Human 250 ml @  62.5 mls/hr 1X  ONCE IV  Last administered on 6/24/20at 

16:27;  Start 6/24/20 at 15:30;  Stop 6/24/20 at 19:29;  Status DC


Sodium Chloride 80 meq/Potassium Chloride 30 meq/ Potassium Acetate 30 

meq/Magnesium Sulfate 14 meq/ Multivitamins 10 ml/Chromium/ Copper/Manganese/ 

Seleni/Zn 1 ml/ Insulin Human Regular 15 unit/ Total Parenteral Nutrition/Amino 

Acids/Dextrose/ Fat Emulsion Intravenous 1,920 ml @  80 mls/hr TPN  CONT IV  

Last administered on 6/25/20at 22:25;  Start 6/25/20 at 22:00;  Stop 6/26/20 at 

21:59;  Status DC


Sodium Chloride 80 meq/Potassium Chloride 30 meq/ Potassium Acetate 30 

meq/Magnesium Sulfate 14 meq/ Multivitamins 10 ml/Chromium/ Copper/Manganese/ 

Seleni/Zn 1 ml/ Insulin Human Regular 15 unit/ Total Parenteral Nutrition/Amino 

Acids/Dextrose/ Fat Emulsion Intravenous 1,920 ml @  80 mls/hr TPN  CONT IV  

Last administered on 6/26/20at 21:32;  Start 6/26/20 at 22:00;  Stop 6/27/20 at 

21:59;  Status DC


Sodium Chloride 80 meq/Potassium Chloride 30 meq/ Potassium Acetate 30 

meq/Magnesium Sulfate 14 meq/ Multivitamins 10 ml/Chromium/ Copper/Manganese/ 

Seleni/Zn 1 ml/ Insulin Human Regular 15 unit/ Total Parenteral Nutrition/Amino 

Acids/Dextrose/ Fat Emulsion Intravenous 1,920 ml @  80 mls/hr TPN  CONT IV  

Last administered on 6/27/20at 21:53;  Start 6/27/20 at 22:00;  Stop 6/28/20 at 

21:59;  Status DC


Acetylcysteine (Mucomyst 20% Resp Treatment) 600 mg RTBID NEB  Last administered

on 7/25/20at 08:51;  Start 6/27/20 at 12:00


Sodium Chloride 80 meq/Potassium Chloride 30 meq/ Potassium Acetate 30 

meq/Magnesium Sulfate 14 meq/ Multivitamins 10 ml/Chromium/ Copper/Manganese/ 

Seleni/Zn 1 ml/ Insulin Human Regular 15 unit/ Total Parenteral Nutrition/Amino 

Acids/Dextrose/ Fat Emulsion Intravenous 1,920 ml @  80 mls/hr TPN  CONT IV  

Last administered on 6/28/20at 22:06;  Start 6/28/20 at 22:00;  Stop 6/29/20 at 

21:59;  Status DC


Meropenem 500 mg/ Sodium Chloride 50 ml @  100 mls/hr Q6HRS IV  Last 

administered on 7/21/20at 06:15;  Start 6/28/20 at 18:00;  Stop 7/21/20 at 

08:23;  Status DC


Daptomycin 500 mg/ Sodium Chloride 50 ml @  100 mls/hr Q24H IV  Last 

administered on 7/6/20at 21:47;  Start 6/28/20 at 19:00;  Stop 7/7/20 at 08:13; 

Status DC


Sodium Chloride 80 meq/Potassium Chloride 30 meq/ Potassium Acetate 30 

meq/Magnesium Sulfate 14 meq/ Multivitamins 10 ml/Chromium/ Copper/Manganese/ 

Seleni/Zn 1 ml/ Insulin Human Regular 15 unit/ Total Parenteral Nutrition/Amino 

Acids/Dextrose/ Fat Emulsion Intravenous 1,920 ml @  80 mls/hr TPN  CONT IV  

Last administered on 6/29/20at 22:09;  Start 6/29/20 at 22:00;  Stop 6/30/20 at 

21:59;  Status DC


Heparin Sodium (Porcine) 1000 unit/Sodium Chloride 1,001 ml @  1,001 mls/hr 1X  

ONCE IRR ;  Start 6/30/20 at 06:00;  Stop 6/30/20 at 06:59;  Status DC


Propofol (Diprivan) 200 mg STK-MED ONCE IV ;  Start 6/30/20 at 07:44;  Stop 

6/30/20 at 07:44;  Status DC


Lidocaine HCl (Lidocaine Pf 2% Vial) 5 ml STK-MED ONCE .ROUTE ;  Start 6/30/20 

at 07:44;  Stop 6/30/20 at 07:44;  Status DC


Fentanyl Citrate (Fentanyl 2ml Vial) 100 mcg STK-MED ONCE .ROUTE ;  Start 

6/30/20 at 07:44;  Stop 6/30/20 at 07:44;  Status DC


Rocuronium Bromide (Zemuron) 100 mg STK-MED ONCE .ROUTE ;  Start 6/30/20 at 

07:44;  Stop 6/30/20 at 07:44;  Status DC


Micafungin Sodium 100 mg/Dextrose 100 ml @  100 mls/hr Q24H IV  Last 

administered on 7/25/20at 07:58;  Start 6/30/20 at 08:30


Bupivacaine HCl/ Epinephrine Bitart (Sensorcain-Epi 0.5%-1:785179 Mpf) 30 ml 

STK-MED ONCE .ROUTE ;  Start 6/30/20 at 08:34;  Stop 6/30/20 at 08:35;  Status 

DC


Iohexol (Omnipaque 300 Mg/ml) 50 ml STK-MED ONCE .ROUTE  Last administered on 

6/30/20at 13:30;  Start 6/30/20 at 08:35;  Stop 6/30/20 at 08:35;  Status DC


Sodium Chloride 80 meq/Potassium Chloride 30 meq/ Potassium Acetate 30 

meq/Magnesium Sulfate 14 meq/ Multivitamins 10 ml/Chromium/ Copper/Manganese/ 

Seleni/Zn 1 ml/ Insulin Human Regular 15 unit/ Total Parenteral Nutrition/Amino 

Acids/Dextrose/ Fat Emulsion Intravenous 1,920 ml @  80 mls/hr TPN  CONT IV  

Last administered on 7/1/20at 01:22;  Start 6/30/20 at 22:00;  Stop 7/1/20 at 

21:59;  Status DC


Phenylephrine HCl (Ken-Synephrine Inj) 10 mg STK-MED ONCE .ROUTE ;  Start 

6/30/20 at 10:15;  Stop 6/30/20 at 10:15;  Status DC


Desflurane (Suprane) 90 ml STK-MED ONCE IH ;  Start 6/30/20 at 10:18;  Stop 

6/30/20 at 10:19;  Status DC


Albumin Human 500 ml @  As Directed STK-MED ONCE IV ;  Start 6/30/20 at 11:06;  

Stop 6/30/20 at 11:06;  Status DC


Vasopressin (Vasostrict) 20 unit STK-MED ONCE .ROUTE ;  Start 6/30/20 at 12:23; 

Stop 6/30/20 at 12:23;  Status DC


Phenylephrine HCl (Ken-Synephrine Inj) 10 mg STK-MED ONCE .ROUTE ;  Start 

6/30/20 at 13:33;  Stop 6/30/20 at 13:33;  Status DC


Phenylephrine HCl (Ken-Synephrine Inj) 10 mg STK-MED ONCE .ROUTE ;  Start 

6/30/20 at 13:33;  Stop 6/30/20 at 13:33;  Status DC


Ondansetron HCl (Zofran) 4 mg STK-MED ONCE .ROUTE ;  Start 6/30/20 at 13:33;  

Stop 6/30/20 at 13:33;  Status DC


Enoxaparin Sodium (Lovenox 40mg Syringe) 40 mg Q24H SQ  Last administered on 

7/25/20at 07:56;  Start 7/1/20 at 08:00


Sodium Chloride (Normal Saline Flush) 3 ml QSHIFT  PRN IV AFTER MEDS AND BLOOD 

DRAWS;  Start 6/30/20 at 14:45


Naloxone HCl (Narcan) 0.4 mg PRN Q2MIN  PRN IV SEE INSTRUCTIONS;  Start 6/30/20 

at 14:45


Sodium Chloride 1,000 ml @  25 mls/hr Q24H IV  Last administered on 7/21/20at 

14:54;  Start 6/30/20 at 14:33


Morphine Sulfate (Morphine Sulfate) 1 mg PRN Q1HR  PRN IV PAIN;  Start 6/30/20 

at 14:45


Midazolam HCl 100 mg/Sodium Chloride 100 ml @ 1 mls/hr CONT  PRN IV SEE I/O 

RECORD Last administered on 7/3/20at 18:48;  Start 6/30/20 at 14:45


Phenylephrine HCl (PHENYLEPHRINE in 0.9% NACL PF) 1 mg STK-MED ONCE IV ;  Start 

6/30/20 at 14:44;  Stop 6/30/20 at 14:45;  Status DC


Ephedrine Sulfate (ePHEDrine PF IN SALINE SYRINGE) 50 mg STK-MED ONCE IV ;  

Start 6/30/20 at 14:45;  Stop 6/30/20 at 14:45;  Status DC


Vasopressin 20 unit/Dextrose 101 ml @  12 mls/hr CONT  PRN IV SEE I/O RECORD 

Last administered on 7/7/20at 04:17;  Start 6/30/20 at 15:30


Sodium Chloride 1,000 ml @  1,000 mls/hr 1X  ONCE IV  Last administered on 

6/30/20at 15:42;  Start 6/30/20 at 15:45;  Stop 6/30/20 at 16:44;  Status DC


Albumin Human 500 ml @  125 mls/hr 1X  ONCE IV ;  Start 6/30/20 at 16:00;  Stop 

6/30/20 at 19:59;  Status DC


Albumin Human 500 ml @  125 mls/hr PRN Q1HR  PRN IV PER PROTOCOL;  Start 6/30/20

at 15:45


Magnesium Sulfate 50 ml @ 25 mls/hr 1X  ONCE IV  Last administered on 6/30/20at 

17:02;  Start 6/30/20 at 16:30;  Stop 6/30/20 at 18:29;  Status DC


Sodium Bicarbonate (Sodium Bicarb Adult 8.4% Syr) 50 meq STK-MED ONCE .ROUTE ;  

Start 6/30/20 at 16:20;  Stop 6/30/20 at 16:20;  Status DC


Sodium Bicarbonate (Sodium Bicarb Adult 8.4% Syr) 100 meq 1X  ONCE IV  Last 

administered on 6/30/20at 17:07;  Start 6/30/20 at 16:30;  Stop 6/30/20 at 

16:31;  Status DC


Sodium Bicarbonate 150 meq/Dextrose 1,150 ml @  75 mls/hr 1X  ONCE IV  Last 

administered on 6/30/20at 20:02;  Start 6/30/20 at 16:30;  Stop 7/1/20 at 07:49;

 Status DC


Sodium Chloride 80 meq/Potassium Chloride 30 meq/ Potassium Acetate 30 

meq/Magnesium Sulfate 14 meq/ Multivitamins 10 ml/Chromium/ Copper/Manganese/ 

Seleni/Zn 1 ml/ Insulin Human Regular 15 unit/ Total Parenteral Nutrition/Amino 

Acids/Dextrose/ Fat Emulsion Intravenous 1,920 ml @  80 mls/hr TPN  CONT IV  

Last administered on 7/1/20at 23:05;  Start 7/1/20 at 22:00;  Stop 7/2/20 at 

21:59;  Status DC


Sodium Chloride 100 meq/Potassium Chloride 30 meq/ Potassium Acetate 30 

meq/Magnesium Sulfate 12 meq/ Multivitamins 10 ml/Chromium/ Copper/Manganese/ 

Seleni/Zn 1 ml/ Insulin Human Regular 15 unit/ Total Parenteral Nutrition/Amino 

Acids/Dextrose/ Fat Emulsion Intravenous 1,920 ml @  80 mls/hr TPN  CONT IV  

Last administered on 7/2/20at 21:52;  Start 7/2/20 at 22:00;  Stop 7/3/20 at 21

:59;  Status DC


Sodium Chloride 100 meq/Potassium Chloride 30 meq/ Potassium Acetate 30 

meq/Magnesium Sulfate 12 meq/ Multivitamins 10 ml/Chromium/ Copper/Manganese/ 

Seleni/Zn 1 ml/ Insulin Human Regular 15 unit/ Total Parenteral Nutrition/Amino 

Acids/Dextrose/ Fat Emulsion Intravenous 1,920 ml @  80 mls/hr TPN  CONT IV  

Last administered on 7/3/20at 21:46;  Start 7/3/20 at 22:00;  Stop 7/4/20 at 

21:59;  Status DC


Sodium Chloride 100 meq/Potassium Chloride 30 meq/ Potassium Acetate 30 

meq/Magnesium Sulfate 12 meq/ Multivitamins 10 ml/Chromium/ Copper/Manganese/ 

Seleni/Zn 1 ml/ Insulin Human Regular 15 unit/ Total Parenteral Nutrition/Amino 

Acids/Dextrose/ Fat Emulsion Intravenous 1,800 ml @  75 mls/hr TPN  CONT IV  

Last administered on 7/4/20at 22:04;  Start 7/4/20 at 22:00;  Stop 7/5/20 at 

21:59;  Status DC


Fentanyl Citrate 55 ml @ 0 mls/hr CONT  PRN IV SEE COMMENTS Last administered on

7/6/20at 23:55;  Start 7/4/20 at 13:00;  Stop 7/9/20 at 17:28;  Status DC


Sodium Chloride 100 meq/Potassium Chloride 30 meq/ Potassium Acetate 30 

meq/Magnesium Sulfate 12 meq/ Multivitamins 10 ml/Chromium/ Copper/Manganese/ 

Seleni/Zn 1 ml/ Insulin Human Regular 15 unit/ Total Parenteral Nutrition/Amino 

Acids/Dextrose/ Fat Emulsion Intravenous 1,680 ml @  70 mls/hr TPN  CONT IV  

Last administered on 7/5/20at 21:23;  Start 7/5/20 at 22:00;  Stop 7/6/20 at 

21:59;  Status DC


Sodium Chloride 110 meq/Potassium Chloride 30 meq/ Potassium Acetate 30 

meq/Magnesium Sulfate 15 meq/ Multivitamins 10 ml/Chromium/ Copper/Manganese/ 

Seleni/Zn 1 ml/ Insulin Human Regular 15 unit/ Total Parenteral Nutrition/Amino 

Acids/Dextrose/ Fat Emulsion Intravenous 1,680 ml @  70 mls/hr TPN  CONT IV  

Last administered on 7/6/20at 21:48;  Start 7/6/20 at 22:00;  Stop 7/7/20 at 

21:59;  Status DC


Sodium Chloride 110 meq/Potassium Chloride 30 meq/ Potassium Acetate 30 

meq/Magnesium Sulfate 15 meq/ Multivitamins 10 ml/Chromium/ Copper/Manganese/ 

Seleni/Zn 1 ml/ Insulin Human Regular 15 unit/ Total Parenteral Nutrition/Amino 

Acids/Dextrose/ Fat Emulsion Intravenous 1,680 ml @  70 mls/hr TPN  CONT IV  

Last administered on 7/7/20at 21:33;  Start 7/7/20 at 22:00;  Stop 7/8/20 at 

21:59;  Status DC


Sodium Chloride 110 meq/Potassium Chloride 30 meq/ Potassium Acetate 30 

meq/Magnesium Sulfate 15 meq/ Multivitamins 10 ml/Chromium/ Copper/Manganese/ 

Seleni/Zn 1 ml/ Insulin Human Regular 15 unit/ Total Parenteral Nutrition/Amino 

Acids/Dextrose/ Fat Emulsion Intravenous 1,680 ml @  70 mls/hr TPN  CONT IV  

Last administered on 7/8/20at 21:51;  Start 7/8/20 at 22:00;  Stop 7/9/20 at 

21:59;  Status DC


Sodium Chloride 90 meq/Potassium Chloride 30 meq/ Potassium Acetate 30 

meq/Magnesium Sulfate 15 meq/ Multivitamins 10 ml/Chromium/ Copper/Manganese/ 

Seleni/Zn 1 ml/ Insulin Human Regular 15 unit/ Total Parenteral Nutrition/Amino 

Acids/Dextrose/ Fat Emulsion Intravenous 1,680 ml @  70 mls/hr TPN  CONT IV  

Last administered on 7/9/20at 22:38;  Start 7/9/20 at 22:00;  Stop 7/10/20 at 

21:59;  Status DC


Fentanyl Citrate 30 ml @ 0 mls/hr CONT  PRN IV SEE I/O RECORD;  Start 7/9/20 at 

17:30


Fentanyl (Duragesic 12mcg/ Hr Patch) 1 patch Q3DAYS TD  Last administered on 

7/25/20at 09:09;  Start 7/10/20 at 09:00


Sodium Chloride 90 meq/Potassium Chloride 30 meq/ Potassium Acetate 30 

meq/Magnesium Sulfate 15 meq/ Multivitamins 10 ml/Chromium/ Copper/Manganese/ 

Seleni/Zn 1 ml/ Insulin Human Regular 15 unit/ Total Parenteral Nutrition/Amino 

Acids/Dextrose/ Fat Emulsion Intravenous 1,680 ml @  70 mls/hr TPN  CONT IV  

Last administered on 7/10/20at 21:59;  Start 7/10/20 at 22:00;  Stop 7/11/20 at 

21:59;  Status DC


Sodium Chloride 90 meq/Potassium Chloride 30 meq/ Potassium Acetate 30 

meq/Magnesium Sulfate 15 meq/ Multivitamins 10 ml/Chromium/ Copper/Manganese/ 

Seleni/Zn 1 ml/ Insulin Human Regular 15 unit/ Total Parenteral Nutrition/Amino 

Acids/Dextrose/ Fat Emulsion Intravenous 1,680 ml @  70 mls/hr TPN  CONT IV  

Last administered on 7/11/20at 21:35;  Start 7/11/20 at 22:00;  Stop 7/12/20 at 

21:59;  Status DC


Vancomycin HCl (Vanco Per Pharmacy) 1 each PRN DAILY  PRN MC SEE COMMENTS Last 

administered on 7/14/20at 02:46;  Start 7/12/20 at 09:15;  Stop 7/15/20 at 

07:41;  Status DC


Ciprofloxacin/ Dextrose 200 ml @  200 mls/hr Q12HR IV  Last administered on 

7/20/20at 21:02;  Start 7/12/20 at 10:00;  Stop 7/21/20 at 08:20;  Status DC


Vancomycin HCl 2 gm/Sodium Chloride 500 ml @  250 mls/hr 1X  ONCE IV  Last 

administered on 7/12/20at 10:34;  Start 7/12/20 at 10:00;  Stop 7/12/20 at 

11:59;  Status DC


Sodium Chloride 90 meq/Potassium Chloride 30 meq/ Potassium Acetate 30 

meq/Magnesium Sulfate 15 meq/ Multivitamins 10 ml/Chromium/ Copper/Manganese/ 

Seleni/Zn 1 ml/ Insulin Human Regular 15 unit/ Total Parenteral Nutrition/Amino 

Acids/Dextrose/ Fat Emulsion Intravenous 1,680 ml @  70 mls/hr TPN  CONT IV  

Last administered on 7/12/20at 22:02;  Start 7/12/20 at 22:00;  Stop 7/13/20 at 

21:59;  Status DC


Diphenhydramine HCl (Benadryl) 25 mg 1X  ONCE IVP  Last administered on 

7/12/20at 14:26;  Start 7/12/20 at 14:30;  Stop 7/12/20 at 14:31;  Status DC


Vancomycin HCl 1.5 gm/Sodium Chloride 500 ml @  250 mls/hr Q8H IV  Last admin

istered on 7/13/20at 03:08;  Start 7/12/20 at 18:30;  Stop 7/13/20 at 12:24;  

Status DC


Vancomycin HCl (Vancomycin Trough Level) 1 each 1X  ONCE MC  Last administered 

on 7/13/20at 10:00;  Start 7/13/20 at 10:00;  Stop 7/13/20 at 10:01;  Status DC


Sodium Chloride 90 meq/Potassium Chloride 30 meq/ Potassium Acetate 30 

meq/Magnesium Sulfate 15 meq/ Multivitamins 10 ml/Chromium/ Copper/Manganese/ 

Seleni/Zn 1 ml/ Insulin Human Regular 15 unit/ Total Parenteral Nutrition/Amino 

Acids/Dextrose/ Fat Emulsion Intravenous 1,680 ml @  70 mls/hr TPN  CONT IV  

Last administered on 7/13/20at 22:13;  Start 7/13/20 at 22:00;  Stop 7/14/20 at 

21:59;  Status DC


Vancomycin HCl (Vancomycin Random Level) 1 each 1X  ONCE MC  Last administered 

on 7/14/20at 01:00;  Start 7/14/20 at 01:00;  Stop 7/14/20 at 01:01;  Status DC


Vancomycin HCl 1.5 gm/Sodium Chloride 500 ml @  250 mls/hr Q12H IV  Last 

administered on 7/14/20at 22:07;  Start 7/14/20 at 10:00;  Stop 7/15/20 at 

07:41;  Status DC


Vancomycin HCl (Vancomycin Trough Level) 1 each 1X  ONCE MC ;  Start 7/15/20 at 

09:30;  Stop 7/15/20 at 09:31;  Status Cancel


Sodium Chloride 90 meq/Potassium Chloride 30 meq/ Potassium Acetate 30 

meq/Magnesium Sulfate 15 meq/ Multivitamins 10 ml/Chromium/ Copper/Manganese/ 

Seleni/Zn 1 ml/ Insulin Human Regular 15 unit/ Total Parenteral Nutrition/Amino 

Acids/Dextrose/ Fat Emulsion Intravenous 1,680 ml @  70 mls/hr TPN  CONT IV  

Last administered on 7/14/20at 22:08;  Start 7/14/20 at 22:00;  Stop 7/15/20 at 

21:59;  Status DC


Alteplase, Recombinant (Cathflo For Central Catheter Clearance) 1 mg 1X  ONCE 

INT CAT  Last administered on 7/14/20at 11:49;  Start 7/14/20 at 11:00;  Stop 7/ 14/20 at 11:01;  Status DC


Daptomycin 500 mg/ Sodium Chloride 50 ml @  100 mls/hr Q24H IV  Last 

administered on 7/24/20at 08:25;  Start 7/15/20 at 09:00;  Stop 7/24/20 at 

08:38;  Status DC


Sodium Chloride 90 meq/Potassium Chloride 30 meq/ Potassium Acetate 30 

meq/Magnesium Sulfate 15 meq/ Multivitamins 10 ml/Chromium/ Copper/Manganese/ 

Seleni/Zn 1 ml/ Insulin Human Regular 15 unit/ Total Parenteral Nutrition/Amino 

Acids/Dextrose/ Fat Emulsion Intravenous 1,680 ml @  70 mls/hr TPN  CONT IV  

Last administered on 7/15/20at 22:55;  Start 7/15/20 at 22:00;  Stop 7/16/20 at 

21:59;  Status DC


Sodium Chloride 90 meq/Potassium Chloride 30 meq/ Potassium Acetate 30 

meq/Magnesium Sulfate 15 meq/ Multivitamins 10 ml/Chromium/ Copper/Manganese/ 

Seleni/Zn 1 ml/ Insulin Human Regular 15 unit/ Total Parenteral Nutrition/Amino 

Acids/Dextrose/ Fat Emulsion Intravenous 1,680 ml @  70 mls/hr TPN  CONT IV  

Last administered on 7/16/20at 22:06;  Start 7/16/20 at 22:00;  Stop 7/17/20 at 

21:59;  Status DC


Diphenhydramine HCl (Benadryl) 50 mg STK-MED ONCE .ROUTE ;  Start 7/16/20 at 

18:34;  Stop 7/16/20 at 18:35;  Status DC


Diphenhydramine HCl (Benadryl) 25 mg 1X  ONCE IM ;  Start 7/16/20 at 18:45;  

Stop 7/16/20 at 18:46;  Status DC


Diphenhydramine HCl (Benadryl) 25 mg 1X  ONCE IVP  Last administered on 

7/16/20at 18:56;  Start 7/16/20 at 19:00;  Stop 7/16/20 at 19:01;  Status DC


Alprazolam (Xanax) 0.5 mg PRN TID  PRN PO ANXIETY / AGITATION Last administered 

on 7/23/20at 11:49;  Start 7/17/20 at 08:00;  Stop 7/23/20 at 15:54;  Status DC


Sodium Chloride 110 meq/Potassium Chloride 30 meq/ Potassium Acetate 30 

meq/Magnesium Sulfate 15 meq/ Multivitamins 10 ml/Chromium/ Copper/Manganese/ 

Seleni/Zn 1 ml/ Insulin Human Regular 15 unit/ Total Parenteral Nutrition/Amino 

Acids/Dextrose/ Fat Emulsion Intravenous 1,680 ml @  70 mls/hr TPN  CONT IV  

Last administered on 7/17/20at 21:21;  Start 7/17/20 at 22:00;  Stop 7/18/20 at 

21:59;  Status DC


Sodium Chloride 110 meq/Potassium Chloride 30 meq/ Potassium Acetate 30 

meq/Magnesium Sulfate 15 meq/ Multivitamins 10 ml/Chromium/ Copper/Manganese/ 

Seleni/Zn 1 ml/ Insulin Human Regular 15 unit/ Total Parenteral Nutrition/Amino 

Acids/Dextrose/ Fat Emulsion Intravenous 1,680 ml @  70 mls/hr TPN  CONT IV  

Last administered on 7/18/20at 22:01;  Start 7/18/20 at 22:00;  Stop 7/19/20 at 

21:59;  Status DC


Alteplase, Recombinant (Cathflo For Central Catheter Clearance) 1 mg 1X  ONCE 

INT CAT  Last administered on 7/19/20at 08:23;  Start 7/19/20 at 08:15;  Stop 

7/19/20 at 08:16;  Status DC


Sodium Chloride 110 meq/Sodium Phosphate 10 mmol/ Potassium Chloride 30 meq/ 

Potassium Acetate 30 meq/Magnesium Sulfate 15 meq/ Multivitamins 10 ml/Chromium/

Copper/Manganese/ Seleni/Zn 1 ml/ Insulin Human Regular 15 unit/ Total 

Parenteral Nutrition/Amino Acids/Dextrose/ Fat Emulsion Intravenous 1,680 ml @  

70 mls/hr TPN  CONT IV  Last administered on 7/19/20at 22:03;  Start 7/19/20 at 

22:00;  Stop 7/20/20 at 21:59;  Status DC


Sodium Chloride 120 meq/Sodium Phosphate 10 mmol/ Potassium Chloride 30 meq/ 

Potassium Acetate 30 meq/Magnesium Sulfate 15 meq/ Multivitamins 10 ml/Chromium/

Copper/Manganese/ Seleni/Zn 1 ml/ Insulin Human Regular 15 unit/ Total Parentera

l Nutrition/Amino Acids/Dextrose/ Fat Emulsion Intravenous 1,680 ml @  70 mls/hr

TPN  CONT IV  Last administered on 7/20/20at 22:08;  Start 7/20/20 at 22:00;  

Stop 7/21/20 at 21:59;  Status DC


Ceftazidime/ Avibactam 2.5 gm/ Sodium Chloride 100 ml @  50 mls/hr Q8HRS IV  

Last administered on 7/22/20at 05:32;  Start 7/21/20 at 14:00;  Stop 7/22/20 at 

07:48;  Status DC


Alteplase, Recombinant (Cathflo For Central Catheter Clearance) 1 mg 1X  ONCE 

INT CAT  Last administered on 7/21/20at 09:30;  Start 7/21/20 at 09:30;  Stop 

7/21/20 at 09:31;  Status DC


Sodium Chloride 120 meq/Sodium Phosphate 10 mmol/ Potassium Chloride 30 meq/ 

Potassium Acetate 30 meq/Magnesium Sulfate 15 meq/ Multivitamins 10 ml/Chromium/

Copper/Manganese/ Seleni/Zn 1 ml/ Insulin Human Regular 15 unit/ Total 

Parenteral Nutrition/Amino Acids/Dextrose/ Fat Emulsion Intravenous 1,680 ml @  

70 mls/hr TPN  CONT IV  Last administered on 7/21/20at 22:11;  Start 7/21/20 at 

22:00;  Stop 7/22/20 at 21:59;  Status DC


Iohexol (Omnipaque 300 Mg/ml) 75 ml 1X  ONCE IV  Last administered on 7/21/20at 

11:30;  Start 7/21/20 at 11:30;  Stop 7/21/20 at 11:31;  Status DC


Info (CONTRAST GIVEN -- Rx MONITORING) 1 each PRN DAILY  PRN MC SEE COMMENTS;  

Start 7/21/20 at 11:45;  Stop 7/23/20 at 11:44;  Status DC


Alteplase, Recombinant (Cathflo For Central Catheter Clearance) 1 mg 1X  ONCE 

INT CAT  Last administered on 7/21/20at 12:17;  Start 7/21/20 at 12:00;  Stop 

7/21/20 at 12:01;  Status DC


Cefepime HCl (Maxipime) 2 gm Q12HR IVP  Last administered on 7/22/20at 20:53;  

Start 7/22/20 at 09:00;  Stop 7/23/20 at 07:30;  Status DC


Sodium Chloride 120 meq/Sodium Phosphate 10 mmol/ Potassium Chloride 30 meq/ 

Potassium Acetate 30 meq/Magnesium Sulfate 15 meq/ Multivitamins 10 ml/Chromium/

Copper/Manganese/ Seleni/Zn 1 ml/ Insulin Human Regular 15 unit/ Total 

Parenteral Nutrition/Amino Acids/Dextrose/ Fat Emulsion Intravenous 1,680 ml @  

70 mls/hr TPN  CONT IV  Last administered on 7/22/20at 21:25;  Start 7/22/20 at 

22:00;  Stop 7/23/20 at 21:59;  Status DC


Ceftazidime/ Avibactam 2.5 gm/ Sodium Chloride 250 ml @  125 mls/hr Q8HRS IV  

Last administered on 7/25/20at 05:35;  Start 7/23/20 at 08:00


Sodium Chloride 120 meq/Sodium Phosphate 10 mmol/ Potassium Chloride 30 meq/ 

Potassium Acetate 30 meq/Magnesium Sulfate 15 meq/ Multivitamins 10 ml/Chromium/

Copper/Manganese/ Seleni/Zn 1 ml/ Insulin Human Regular 15 unit/ Total 

Parenteral Nutrition/Amino Acids/Dextrose/ Fat Emulsion Intravenous 1,680 ml @  

70 mls/hr TPN  CONT IV  Last administered on 7/23/20at 22:35;  Start 7/23/20 at 

22:00;  Stop 7/24/20 at 21:59;  Status DC


Alprazolam (Xanax) 0.5 mg PRN QID  PRN PO ANXIETY / AGITATION Last administered 

on 7/25/20at 00:57;  Start 7/23/20 at 16:00


Acetaminophen/ Hydrocodone Bitart (Lortab 5/325) 1 tab PRN Q4HRS  PRN PO PAIN 

Last administered on 7/24/20at 19:34;  Start 7/23/20 at 16:00


Sodium Chloride 120 meq/Sodium Phosphate 10 mmol/ Potassium Chloride 30 meq/ 

Potassium Acetate 30 meq/Magnesium Sulfate 15 meq/ Multivitamins 10 ml/Chromium/

Copper/Manganese/ Seleni/Zn 1 ml/ Insulin Human Regular 15 unit/ Total 

Parenteral Nutrition/Amino Acids/Dextrose/ Fat Emulsion Intravenous 1,680 ml @  

70 mls/hr TPN  CONT IV  Last administered on 7/24/20at 21:50;  Start 7/24/20 at 

22:00;  Stop 7/25/20 at 21:59





Active Scripts


Active


Reported


Bisoprolol Fumarate 5 Mg Tablet 10 Mg PO DAILY


Vitals/I & O





Vital Sign - Last 24 Hours








 7/24/20 7/24/20 7/24/20 7/24/20





 09:52 11:26 12:00 12:40


 


Temp   97.9 





   97.9 


 


Pulse   125 


 


Resp 36 36 38 


 


B/P (MAP)   124/94 (104) 


 


Pulse Ox 97  98 96


 


O2 Delivery Room Air Nasal Cannula Room Air Room Air


 


O2 Flow Rate  2.0  


 


    





    





 7/24/20 7/24/20 7/24/20 7/24/20





 13:05 14:08 16:00 16:36


 


Temp   98.1 





   98.1 


 


Pulse   131 


 


Resp 32 28 38 


 


B/P (MAP)   128/77 (94) 


 


Pulse Ox 97 98 96 97


 


O2 Delivery Room Air Room Air Room Air Nasal Cannula


 


O2 Flow Rate    2.0


 


    





    





 7/24/20 7/24/20 7/24/20 7/24/20





 19:34 19:38 19:40 20:29


 


Temp   100.0 





   100.0 


 


Pulse   134 


 


Resp 32   


 


B/P (MAP)   146/87 (106) 


 


Pulse Ox 98  98 97


 


O2 Delivery  Room Air Room Air Nasal Cannula


 


O2 Flow Rate    2.0


 


    





    





 7/24/20 7/24/20 7/24/20 7/25/20





 20:32 20:41 22:58 02:39


 


Temp   98.2 98.1





   98.2 98.1


 


Pulse   119 137


 


Resp  20 22 


 


B/P (MAP)   136/64 (88) 146/80 (102)


 


Pulse Ox 97 100 100 96


 


O2 Delivery Nasal Cannula Nasal Cannula Nasal Cannula Nasal Cannula


 


O2 Flow Rate 2.0 2.0 2.0 2.0


 


    





    





 7/25/20 7/25/20 7/25/20 7/25/20





 03:08 03:45 04:07 04:40


 


Pulse 138   


 


Resp   28 32


 


B/P (MAP) 146/80   


 


Pulse Ox  97 96 98


 


O2 Delivery  Nasal Cannula Nasal Cannula Nasal Cannula


 


O2 Flow Rate  2.0 2.0 2.0





 7/25/20 7/25/20 7/25/20 





 07:00 08:58 09:09 


 


Temp 97.8   





 97.8   


 


Pulse 131   


 


B/P (MAP) 136/72 (93)   


 


Pulse Ox 95 93 97 


 


O2 Delivery Room Air Nasal Cannula Room Air 


 


O2 Flow Rate 2.0 2.0 2.0 














Intake and Output   


 


 7/24/20 7/24/20 7/25/20





 15:00 23:00 07:00


 


Intake Total 475 ml 1155 ml 


 


Output Total 680 ml 505 ml 600 ml


 


Balance -205 ml 650 ml -600 ml











Justicifation of Admission Dx:


Justifications for Admission:


Justification of Admission Dx:  Yes











OVIDIO SARAVIA MD          Jul 25, 2020 09:34

## 2020-07-25 NOTE — PDOC
SURGICAL PROGRESS NOTE


Subjective


Resting comfortably, no acute changes


Vital Signs





Vital Signs








  Date Time  Temp Pulse Resp B/P (MAP) Pulse Ox O2 Delivery O2 Flow Rate FiO2


 


7/25/20 08:58     93 Room Air  


 


7/25/20 07:00 97.8 131  136/72 (93)   2.0 





 97.8       


 


7/25/20 04:40   32     








I&O











Intake and Output 


 


 7/25/20





 07:00


 


Intake Total 1630 ml


 


Output Total 1785 ml


 


Balance -155 ml


 


 


 


IV Total 1630 ml


 


Tube Feeding 0 ml


 


Output Urine Total 930 ml


 


Drainage Total 855 ml








PATIENT HAS A VILLASENOR:  Yes


General:  Cooperative, mild distress


Abdomen:  Normal bowel sounds, Soft, No tenderness (Incisional tenderness wounds

clean dry and intact wound VAC in place drains in place)


Labs





Laboratory Tests








Test


 7/23/20


12:26 7/23/20


18:12 7/24/20


00:02 7/24/20


06:00


 


Glucose (Fingerstick)


 143 mg/dL


(70-99) 126 mg/dL


(70-99) 138 mg/dL


(70-99) 





 


White Blood Count


 


 


 


 16.9 x10^3/uL


(4.0-11.0)


 


Red Blood Count


 


 


 


 2.66 x10^6/uL


(3.50-5.40)


 


Hemoglobin


 


 


 


 7.9 g/dL


(12.0-15.5)


 


Hematocrit


 


 


 


 24.3 %


(36.0-47.0)


 


Mean Corpuscular Volume    91 fL () 


 


Mean Corpuscular Hemoglobin    30 pg (25-35) 


 


Mean Corpuscular Hemoglobin


Concent 


 


 


 32 g/dL


(31-37)


 


Red Cell Distribution Width


 


 


 


 16.7 %


(11.5-14.5)


 


Platelet Count


 


 


 


 569 x10^3/uL


(140-400)


 


Neutrophils (%) (Auto)    83 % (31-73) 


 


Lymphocytes (%) (Auto)    9 % (24-48) 


 


Monocytes (%) (Auto)    6 % (0-9) 


 


Eosinophils (%) (Auto)    2 % (0-3) 


 


Basophils (%) (Auto)    1 % (0-3) 


 


Neutrophils # (Auto)


 


 


 


 14.1 x10^3/uL


(1.8-7.7)


 


Lymphocytes # (Auto)


 


 


 


 1.5 x10^3/uL


(1.0-4.8)


 


Monocytes # (Auto)


 


 


 


 1.0 x10^3/uL


(0.0-1.1)


 


Eosinophils # (Auto)


 


 


 


 0.3 x10^3/uL


(0.0-0.7)


 


Basophils # (Auto)


 


 


 


 0.1 x10^3/uL


(0.0-0.2)


 


Test


 7/24/20


06:21 7/24/20


07:30 7/24/20


12:24 7/24/20


17:23


 


Glucose (Fingerstick)


 150 mg/dL


(70-99) 


 156 mg/dL


(70-99) 123 mg/dL


(70-99)


 


Sodium Level


 


 136 mmol/L


(136-145) 


 





 


Potassium Level


 


 4.2 mmol/L


(3.5-5.1) 


 





 


Chloride Level


 


 103 mmol/L


() 


 





 


Carbon Dioxide Level


 


 26 mmol/L


(21-32) 


 





 


Anion Gap  7 (6-14)   


 


Blood Urea Nitrogen


 


 13 mg/dL


(7-20) 


 





 


Creatinine


 


 0.6 mg/dL


(0.6-1.0) 


 





 


Estimated GFR


(Cockcroft-Gault) 


 106.3 


 


 





 


BUN/Creatinine Ratio  22 (6-20)   


 


Glucose Level


 


 125 mg/dL


(70-99) 


 





 


Calcium Level


 


 9.4 mg/dL


(8.5-10.1) 


 





 


Total Bilirubin


 


 0.5 mg/dL


(0.2-1.0) 


 





 


Aspartate Amino Transf


(AST/SGOT) 


 16 U/L (15-37) 


 


 





 


Alanine Aminotransferase


(ALT/SGPT) 


 15 U/L (14-59) 


 


 





 


Alkaline Phosphatase


 


 129 U/L


() 


 





 


Total Protein


 


 5.1 g/dL


(6.4-8.2) 


 





 


Albumin


 


 1.1 g/dL


(3.4-5.0) 


 





 


Albumin/Globulin Ratio  0.3 (1.0-1.7)   


 


Test


 7/24/20


23:05 7/25/20


04:45 7/25/20


05:48 





 


Glucose (Fingerstick)


 138 mg/dL


(70-99) 


 138 mg/dL


(70-99) 





 


Sodium Level


 


 137 mmol/L


(136-145) 


 





 


Potassium Level


 


 4.3 mmol/L


(3.5-5.1) 


 





 


Chloride Level


 


 103 mmol/L


() 


 





 


Carbon Dioxide Level


 


 27 mmol/L


(21-32) 


 





 


Anion Gap  7 (6-14)   


 


Blood Urea Nitrogen


 


 15 mg/dL


(7-20) 


 





 


Creatinine


 


 0.6 mg/dL


(0.6-1.0) 


 





 


Estimated GFR


(Cockcroft-Gault) 


 106.3 


 


 





 


Glucose Level


 


 140 mg/dL


(70-99) 


 





 


Calcium Level


 


 10.0 mg/dL


(8.5-10.1) 


 





 


Phosphorus Level


 


 4.2 mg/dL


(2.6-4.7) 


 











Laboratory Tests








Test


 7/24/20


12:24 7/24/20


17:23 7/24/20


23:05 7/25/20


04:45


 


Glucose (Fingerstick)


 156 mg/dL


(70-99) 123 mg/dL


(70-99) 138 mg/dL


(70-99) 





 


Sodium Level


 


 


 


 137 mmol/L


(136-145)


 


Potassium Level


 


 


 


 4.3 mmol/L


(3.5-5.1)


 


Chloride Level


 


 


 


 103 mmol/L


()


 


Carbon Dioxide Level


 


 


 


 27 mmol/L


(21-32)


 


Anion Gap    7 (6-14) 


 


Blood Urea Nitrogen


 


 


 


 15 mg/dL


(7-20)


 


Creatinine


 


 


 


 0.6 mg/dL


(0.6-1.0)


 


Estimated GFR


(Cockcroft-Gault) 


 


 


 106.3 





 


Glucose Level


 


 


 


 140 mg/dL


(70-99)


 


Calcium Level


 


 


 


 10.0 mg/dL


(8.5-10.1)


 


Phosphorus Level


 


 


 


 4.2 mg/dL


(2.6-4.7)


 


Test


 7/25/20


05:48 


 


 





 


Glucose (Fingerstick)


 138 mg/dL


(70-99) 


 


 











Problem List


Problems


Medical Problems:


(1) Acute pancreatitis


Status: Acute  





(2) Cholelithiasis


Status: Acute  








Assessment/Plan


Status post pancreatic debridement


Continue supportive care





Justicifation of Admission Dx:


Justifications for Admission:


Justification of Admission Dx:  Yes











OVIDIO MEDINA MD             Jul 25, 2020 09:02

## 2020-07-26 VITALS — SYSTOLIC BLOOD PRESSURE: 133 MMHG | DIASTOLIC BLOOD PRESSURE: 66 MMHG

## 2020-07-26 VITALS — DIASTOLIC BLOOD PRESSURE: 75 MMHG | SYSTOLIC BLOOD PRESSURE: 122 MMHG

## 2020-07-26 VITALS — DIASTOLIC BLOOD PRESSURE: 73 MMHG | SYSTOLIC BLOOD PRESSURE: 127 MMHG

## 2020-07-26 VITALS — SYSTOLIC BLOOD PRESSURE: 104 MMHG | DIASTOLIC BLOOD PRESSURE: 47 MMHG

## 2020-07-26 VITALS — DIASTOLIC BLOOD PRESSURE: 72 MMHG | SYSTOLIC BLOOD PRESSURE: 124 MMHG

## 2020-07-26 VITALS — DIASTOLIC BLOOD PRESSURE: 75 MMHG | SYSTOLIC BLOOD PRESSURE: 110 MMHG

## 2020-07-26 LAB
ALBUMIN SERPL-MCNC: 1.1 G/DL (ref 3.4–5)
ALBUMIN/GLOB SERPL: 0.2 {RATIO} (ref 1–1.7)
ALP SERPL-CCNC: 119 U/L (ref 46–116)
ALT SERPL-CCNC: 12 U/L (ref 14–59)
ANION GAP SERPL CALC-SCNC: 3 MMOL/L (ref 6–14)
APTT PPP: YELLOW S
AST SERPL-CCNC: 14 U/L (ref 15–37)
BACTERIA #/AREA URNS HPF: (no result) /HPF
BASOPHILS # BLD AUTO: 0 X10^3/UL (ref 0–0.2)
BASOPHILS NFR BLD: 0 % (ref 0–3)
BILIRUB SERPL-MCNC: 0.4 MG/DL (ref 0.2–1)
BILIRUB UR QL STRIP: NEGATIVE
BUN SERPL-MCNC: 15 MG/DL (ref 7–20)
BUN/CREAT SERPL: 30 (ref 6–20)
CALCIUM SERPL-MCNC: 10.3 MG/DL (ref 8.5–10.1)
CHLORIDE SERPL-SCNC: 106 MMOL/L (ref 98–107)
CO2 SERPL-SCNC: 29 MMOL/L (ref 21–32)
CREAT SERPL-MCNC: 0.5 MG/DL (ref 0.6–1)
EOSINOPHIL NFR BLD: 0.4 X10^3/UL (ref 0–0.7)
EOSINOPHIL NFR BLD: 3 % (ref 0–3)
ERYTHROCYTE [DISTWIDTH] IN BLOOD BY AUTOMATED COUNT: 16 % (ref 11.5–14.5)
FIBRINOGEN PPP-MCNC: CLEAR MG/DL
GFR SERPLBLD BASED ON 1.73 SQ M-ARVRAT: 131.1 ML/MIN
GLOBULIN SER-MCNC: 4.8 G/DL (ref 2.2–3.8)
GLUCOSE SERPL-MCNC: 123 MG/DL (ref 70–99)
HCT VFR BLD CALC: 23.8 % (ref 36–47)
HGB BLD-MCNC: 7.7 G/DL (ref 12–15.5)
HYALINE CASTS #/AREA URNS LPF: (no result) /HPF
LYMPHOCYTES # BLD: 1.2 X10^3/UL (ref 1–4.8)
LYMPHOCYTES NFR BLD AUTO: 8 % (ref 24–48)
MCH RBC QN AUTO: 28 PG (ref 25–35)
MCHC RBC AUTO-ENTMCNC: 33 G/DL (ref 31–37)
MCV RBC AUTO: 86 FL (ref 79–100)
MONO #: 1 X10^3/UL (ref 0–1.1)
MONOCYTES NFR BLD: 7 % (ref 0–9)
NEUT #: 12.2 X10^3/UL (ref 1.8–7.7)
NEUTROPHILS NFR BLD AUTO: 83 % (ref 31–73)
NITRITE UR QL STRIP: NEGATIVE
PH UR STRIP: 5 [PH]
PLATELET # BLD AUTO: 563 X10^3/UL (ref 140–400)
POTASSIUM SERPL-SCNC: 4.4 MMOL/L (ref 3.5–5.1)
PROT SERPL-MCNC: 5.9 G/DL (ref 6.4–8.2)
PROT UR STRIP-MCNC: 30 MG/DL
RBC # BLD AUTO: 2.78 X10^6/UL (ref 3.5–5.4)
RBC #/AREA URNS HPF: 0 /HPF (ref 0–2)
SODIUM SERPL-SCNC: 138 MMOL/L (ref 136–145)
UROBILINOGEN UR-MCNC: 0.2 MG/DL
WBC # BLD AUTO: 14.7 X10^3/UL (ref 4–11)
WBC #/AREA URNS HPF: (no result) /HPF (ref 0–4)
YEAST #/AREA URNS HPF: PRESENT /HPF

## 2020-07-26 RX ADMIN — GLYCERIN, ISOLEUCINE, LEUCINE, LYSINE, METHIONINE, PHENYLALANINE, THREONINE, TRYPTOPHAN, VALINE, ALANINE, GLYCINE, ARGININE, HISTIDINE, PROLINE, SERINE, CYSTEINE, SODIUM ACETATE, MAGNESIUM ACETATE, CALCIUM ACETATE, SODIUM CHLORIDE, POTASSIUM CHLORIDE, PHOSPHORIC ACID, AND POTASSIUM METABISULFITE SCH MLS/HR
3; .21; .27; .22; .16; .17; .12; .046; .2; .21; .42; .29; .085; .34; .18; .014; .2; .054; .026; .12; .15; .041 INJECTION INTRAVENOUS at 16:27

## 2020-07-26 RX ADMIN — ACETYLCYSTEINE SCH MG: 200 INHALANT RESPIRATORY (INHALATION) at 08:26

## 2020-07-26 RX ADMIN — IPRATROPIUM BROMIDE AND ALBUTEROL SULFATE SCH ML: .5; 3 SOLUTION RESPIRATORY (INHALATION) at 00:17

## 2020-07-26 RX ADMIN — FENTANYL CITRATE PRN MCG: 50 INJECTION INTRAMUSCULAR; INTRAVENOUS at 17:35

## 2020-07-26 RX ADMIN — Medication PRN EACH: at 08:31

## 2020-07-26 RX ADMIN — IPRATROPIUM BROMIDE AND ALBUTEROL SULFATE SCH ML: .5; 3 SOLUTION RESPIRATORY (INHALATION) at 03:43

## 2020-07-26 RX ADMIN — FENTANYL CITRATE PRN MCG: 50 INJECTION INTRAMUSCULAR; INTRAVENOUS at 20:35

## 2020-07-26 RX ADMIN — CEFTAZIDIME, AVIBACTAM SCH MLS/HR: 2; .5 POWDER, FOR SOLUTION INTRAVENOUS at 14:06

## 2020-07-26 RX ADMIN — INSULIN LISPRO SCH UNITS: 100 INJECTION, SOLUTION INTRAVENOUS; SUBCUTANEOUS at 11:31

## 2020-07-26 RX ADMIN — FENTANYL CITRATE PRN MCG: 50 INJECTION INTRAMUSCULAR; INTRAVENOUS at 04:17

## 2020-07-26 RX ADMIN — DAPTOMYCIN SCH MLS/HR: 500 INJECTION, POWDER, LYOPHILIZED, FOR SOLUTION INTRAVENOUS at 10:13

## 2020-07-26 RX ADMIN — CEFTAZIDIME, AVIBACTAM SCH MLS/HR: 2; .5 POWDER, FOR SOLUTION INTRAVENOUS at 06:19

## 2020-07-26 RX ADMIN — DEXTROSE SCH MLS/HR: 50 INJECTION, SOLUTION INTRAVENOUS at 08:05

## 2020-07-26 RX ADMIN — CEFTAZIDIME, AVIBACTAM SCH MLS/HR: 2; .5 POWDER, FOR SOLUTION INTRAVENOUS at 21:51

## 2020-07-26 RX ADMIN — INSULIN LISPRO SCH UNITS: 100 INJECTION, SOLUTION INTRAVENOUS; SUBCUTANEOUS at 00:00

## 2020-07-26 RX ADMIN — INSULIN LISPRO SCH UNITS: 100 INJECTION, SOLUTION INTRAVENOUS; SUBCUTANEOUS at 06:00

## 2020-07-26 RX ADMIN — IPRATROPIUM BROMIDE AND ALBUTEROL SULFATE SCH ML: .5; 3 SOLUTION RESPIRATORY (INHALATION) at 12:01

## 2020-07-26 RX ADMIN — ENOXAPARIN SODIUM SCH MG: 40 INJECTION SUBCUTANEOUS at 08:49

## 2020-07-26 RX ADMIN — IPRATROPIUM BROMIDE AND ALBUTEROL SULFATE SCH ML: .5; 3 SOLUTION RESPIRATORY (INHALATION) at 20:11

## 2020-07-26 RX ADMIN — IPRATROPIUM BROMIDE AND ALBUTEROL SULFATE SCH ML: .5; 3 SOLUTION RESPIRATORY (INHALATION) at 08:23

## 2020-07-26 RX ADMIN — INSULIN LISPRO SCH UNITS: 100 INJECTION, SOLUTION INTRAVENOUS; SUBCUTANEOUS at 17:40

## 2020-07-26 RX ADMIN — IPRATROPIUM BROMIDE AND ALBUTEROL SULFATE SCH ML: .5; 3 SOLUTION RESPIRATORY (INHALATION) at 15:55

## 2020-07-26 RX ADMIN — PANTOPRAZOLE SODIUM SCH MG: 40 INJECTION, POWDER, FOR SOLUTION INTRAVENOUS at 08:04

## 2020-07-26 RX ADMIN — ACETYLCYSTEINE SCH MG: 200 INHALANT RESPIRATORY (INHALATION) at 20:11

## 2020-07-26 RX ADMIN — BACITRACIN SCH MLS/HR: 5000 INJECTION, POWDER, FOR SOLUTION INTRAMUSCULAR at 14:33

## 2020-07-26 RX ADMIN — FENTANYL CITRATE PRN MCG: 50 INJECTION INTRAMUSCULAR; INTRAVENOUS at 14:02

## 2020-07-26 NOTE — PDOC
PROGRESS NOTES


Chief Complaint


Chief Complaint





IMPRESSION=============








S/P Exp. 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 

parapsilosis & PSAE (I-merrem/ceftazidime/AZT/cefepime))


Leucocytosis -trending upward 


Fever 


Acute gallstone pancreatitis with persistent necrosis


Postop (Exploratory laparotomy, lysis of adhesions, subtotal cholecystectomy 

with cholangiogram, gastrojejunostomy tube placement, pancreatic necrosectomy)


Acute hypoxic Respiratory failure required  mechanical ventilation


Tracheostomy


bilateral pleural effusions/pulm edema s/p Throacentesis on 6/15/2020


Severe Acute gallstone pancreatitis (not a surgical candidate at this time) with

necrosis


Acute kidney failure now requiring dialysis


Gallstones (Calculus of gallbladder with acute cholecystitis without 

obstruction)


HTN 


Intractable pain


Intractable nausea


Covid 19 negative. 


Acute on chronic anemia 


EEG: No seizure activity


Fever  - intermittent 


? Ileus with vomiting


Abd distention - U/S and CT reviewed s/p 0.4 L of opaque, debris-containing a

scites was removed 5/6


Acute pancreatitis with persistent necrosis


Gallstone pancreatitis with necrosis. 


   -CT A/P 6/6 showed multiple pseudocysts, slight larger on the right. s/p 

drains x 3, 6/7.  + PSAE (MDRO-R Cefepime, Zosyn ANSON < 64) and yeast, 


   -s/p drain 4/27. C. parapsilosis. s/p drain 5/6 + yeast & high amylase; s/p 

additional drain on 5/8. Drains removed. 


Ascites s/p paracentesis 4/15 & 5/6. C. parapsilosis 


JED. off HD. 


A large fluid collection in the pancreatic bed has slightly decreased in size, 

described below, the pancreas itself is difficult to  visualize, which could be 

due to necrosis or obscuration of pancreatic  parenchyma from the surrounding 

fluid collection.6/15 


- 4/27 status post ROBERT drain placement + C paropsilosis. s/p additional drains 

5/8


Anemia - S/p PRBCs. 


Cholelithiasis with thickening of the gallbladder wall.


Leucocytosis improving


JED, hyperkalemia, Metabolic acidosis off dialysis


hypocalcemia 


Prediabetes


HTN


s/p trach


Hyperglycemia


severe protein-caloric malnutrition


Moderate to large left pleural effusion with atelectasis and collapse  of most 

of the left lower lobe, stable


Extensive retroperitoneal fluid collections persist. Percutaneous  drains remain

within the collections in both paracolic gutters. These  communicate with 

additional pelvic and peripancreatic collections.











PLAN================











Continue Avycaz /micafungin


DC dapto


Follow-up C. difficile PCR


BC from 7/15 neg to date 


7/26 PICC line removed will start PPN for 24 hours then replace PICC line other 

side














37 MIN CC TIME





History of Present Illness


History of Present Illness


7/25 /2020





Patient seen in and examined in the ICU


She is still extremely critically ill


Appears weak, frail, and pale


Better color today with improved eye contact and expression


Discussed with RN


Chart reviewed











 








7/21/2020


Patient seen and examined in the ICU


She is still extremely critically ill


Appears extremely weak frail and pale


Discussed with RN


Chart reviewed





Vitals


Vitals





Vital Signs








  Date Time  Temp Pulse Resp B/P (MAP) Pulse Ox O2 Delivery O2 Flow Rate FiO2


 


7/26/20 04:47   26  93 Nasal Cannula 2.0 


 


7/26/20 03:00 98.7 123  124/72 (89)    





 98.7       











Physical Exam


Physical Exam


GENERAL: pt in bed, appears weak


HEENT: Pupils equal, oral cavity dry. NGT out


NECK:  Tracheostomy 


LUNGS: Diminished aeration bases,  CT on left 


HEART:  S1, S2,  tachy 110s


ABDOMEN: Distended, bowel sounds hypoactive, soft, richardson x 2, ROBERT drains, G-J 

tube


: [place] in place 


EXTREMITIES: Trace generalized edema, no cyanosis. MARTHA hose bilaterally, 


SKIN: warm touch. No signs of rash.  


LUE-PICC without signs of complications


General:  Cooperative, mild distress


Heart:  Other (distant heart sounds, tachycardic)


Lungs:  Crackles


Abdomen:  Normal bowel sounds, Soft, No tenderness (Incisional tenderness wounds

clean dry and intact wound VAC in place drains in place)


Extremities:  Other (Diffuse edema)


Skin:  No rashes, No significant lesion





Labs


LABS


                                                             STEVE MIRANDA MD,MARCO ANTONIO MEDINA,OVIDIO LARSEN MD


ORDERED:  BCULT                                                                 

 


 

--------------------------------------------------------------------------------


------------





  Procedure                         Result                                      

         


--------------------------------------------------------

------------------------------------





  BLOOD CULTURE  Preliminary  


        NO GROWTH AFTER 4 DAYS                                 





 

--------------------------------------------------------------------------------


------------





 GRAM NEG COCCOBACILLI:MANY


        SQUAMOUS EPI CELL:RARE


        PMN (WBCs):FEW


        Unless otherwise specified, Testing Performed by:


        [address] [address]


        For Inquires, the Physician may contact the Microbiology


        department at [phone]





  RESPIRATORY CULTURE  Final  


        Final





        MANY GRAM NEGATIVE RODS on 06/15/20 at 1105


        FINAL ID= [PSEUDOMONAS [email or web address]]


        MICRO CHARGES


        PSEUDOMONAS AERUGINOSA





  ANTIMICROBIAL SUSCEPTIBILITY  Final  


        Comment





        NEG ANSON 56


        PSEUDOMONAS AERUGINOSA


        ANTIBIOTIC                        RESULT          INTERPRETATION


        AMIKACIN                          <=16                  S


        AZTREONAM                         <=4                   S


        CEFTAZIDIME                       <=1                   S


        CIPROFLOXACIN                     <=0.25                S


        CEFEPIME                          <=2                   S


        CEFTAZIDIME/AVIBACTAM             <=4                   S


        GENTAMICIN                        <=2                   S


        LEVOFLOXACIN                      <=0.5                 S





Laboratory Tests








Test


 7/25/20


11:36 7/25/20


17:48 7/25/20


23:49 7/26/20


05:55


 


Glucose (Fingerstick)


 145 mg/dL


(70-99) 132 mg/dL


(70-99) 139 mg/dL


(70-99) 





 


White Blood Count


 


 


 


 14.7 x10^3/uL


(4.0-11.0)


 


Red Blood Count


 


 


 


 2.78 x10^6/uL


(3.50-5.40)


 


Hemoglobin


 


 


 


 7.7 g/dL


(12.0-15.5)


 


Hematocrit


 


 


 


 23.8 %


(36.0-47.0)


 


Mean Corpuscular Volume    86 fL () 


 


Mean Corpuscular Hemoglobin    28 pg (25-35) 


 


Mean Corpuscular Hemoglobin


Concent 


 


 


 33 g/dL


(31-37)


 


Red Cell Distribution Width


 


 


 


 16.0 %


(11.5-14.5)


 


Platelet Count


 


 


 


 563 x10^3/uL


(140-400)


 


Neutrophils (%) (Auto)    83 % (31-73) 


 


Lymphocytes (%) (Auto)    8 % (24-48) 


 


Monocytes (%) (Auto)    7 % (0-9) 


 


Eosinophils (%) (Auto)    3 % (0-3) 


 


Basophils (%) (Auto)    0 % (0-3) 


 


Neutrophils # (Auto)


 


 


 


 12.2 x10^3/uL


(1.8-7.7)


 


Lymphocytes # (Auto)


 


 


 


 1.2 x10^3/uL


(1.0-4.8)


 


Monocytes # (Auto)


 


 


 


 1.0 x10^3/uL


(0.0-1.1)


 


Eosinophils # (Auto)


 


 


 


 0.4 x10^3/uL


(0.0-0.7)


 


Basophils # (Auto)


 


 


 


 0.0 x10^3/uL


(0.0-0.2)


 


Sodium Level


 


 


 


 138 mmol/L


(136-145)


 


Potassium Level


 


 


 


 4.4 mmol/L


(3.5-5.1)


 


Chloride Level


 


 


 


 106 mmol/L


()


 


Carbon Dioxide Level


 


 


 


 29 mmol/L


(21-32)


 


Anion Gap    3 (6-14) 


 


Blood Urea Nitrogen


 


 


 


 15 mg/dL


(7-20)


 


Creatinine


 


 


 


 0.5 mg/dL


(0.6-1.0)


 


Estimated GFR


(Cockcroft-Gault) 


 


 


 131.1 





 


BUN/Creatinine Ratio    30 (6-20) 


 


Glucose Level


 


 


 


 123 mg/dL


(70-99)


 


Calcium Level


 


 


 


 10.3 mg/dL


(8.5-10.1)


 


Total Bilirubin


 


 


 


 0.4 mg/dL


(0.2-1.0)


 


Aspartate Amino Transf


(AST/SGOT) 


 


 


 14 U/L (15-37) 





 


Alanine Aminotransferase


(ALT/SGPT) 


 


 


 12 U/L (14-59) 





 


Alkaline Phosphatase


 


 


 


 119 U/L


()


 


Total Protein


 


 


 


 5.9 g/dL


(6.4-8.2)


 


Albumin


 


 


 


 1.1 g/dL


(3.4-5.0)


 


Albumin/Globulin Ratio    0.2 (1.0-1.7) 


 


Test


 7/26/20


06:00 


 


 





 


Glucose (Fingerstick)


 120 mg/dL


(70-99) 


 


 














Assessment and Plan


Assessmemt and Plan


Problems


Medical Problems:


(1) Acute pancreatitis


Status: Acute  





(2) Cholelithiasis


Status: Acute  











Comment


Review of Relevant


I have reviewed the following items josy (where applicable) has been applied.


Labs





Laboratory Tests








Test


 7/24/20


12:24 7/24/20


17:23 7/24/20


23:05 7/25/20


04:30


 


Glucose (Fingerstick)


 156 mg/dL


(70-99) 123 mg/dL


(70-99) 138 mg/dL


(70-99) 





 


Clostridium difficile Toxin


(PCR) 


 


 


 Negative


(NEGATIVE)


 


Test


 7/25/20


04:45 7/25/20


05:48 7/25/20


11:36 7/25/20


17:48


 


Sodium Level


 137 mmol/L


(136-145) 


 


 





 


Potassium Level


 4.3 mmol/L


(3.5-5.1) 


 


 





 


Chloride Level


 103 mmol/L


() 


 


 





 


Carbon Dioxide Level


 27 mmol/L


(21-32) 


 


 





 


Anion Gap 7 (6-14)    


 


Blood Urea Nitrogen


 15 mg/dL


(7-20) 


 


 





 


Creatinine


 0.6 mg/dL


(0.6-1.0) 


 


 





 


Estimated GFR


(Cockcroft-Gault) 106.3 


 


 


 





 


Glucose Level


 140 mg/dL


(70-99) 


 


 





 


Calcium Level


 10.0 mg/dL


(8.5-10.1) 


 


 





 


Phosphorus Level


 4.2 mg/dL


(2.6-4.7) 


 


 





 


Glucose (Fingerstick)


 


 138 mg/dL


(70-99) 145 mg/dL


(70-99) 132 mg/dL


(70-99)


 


Test


 7/25/20


23:49 7/26/20


05:55 7/26/20


06:00 





 


Glucose (Fingerstick)


 139 mg/dL


(70-99) 


 120 mg/dL


(70-99) 





 


White Blood Count


 


 14.7 x10^3/uL


(4.0-11.0) 


 





 


Red Blood Count


 


 2.78 x10^6/uL


(3.50-5.40) 


 





 


Hemoglobin


 


 7.7 g/dL


(12.0-15.5) 


 





 


Hematocrit


 


 23.8 %


(36.0-47.0) 


 





 


Mean Corpuscular Volume  86 fL ()   


 


Mean Corpuscular Hemoglobin  28 pg (25-35)   


 


Mean Corpuscular Hemoglobin


Concent 


 33 g/dL


(31-37) 


 





 


Red Cell Distribution Width


 


 16.0 %


(11.5-14.5) 


 





 


Platelet Count


 


 563 x10^3/uL


(140-400) 


 





 


Neutrophils (%) (Auto)  83 % (31-73)   


 


Lymphocytes (%) (Auto)  8 % (24-48)   


 


Monocytes (%) (Auto)  7 % (0-9)   


 


Eosinophils (%) (Auto)  3 % (0-3)   


 


Basophils (%) (Auto)  0 % (0-3)   


 


Neutrophils # (Auto)


 


 12.2 x10^3/uL


(1.8-7.7) 


 





 


Lymphocytes # (Auto)


 


 1.2 x10^3/uL


(1.0-4.8) 


 





 


Monocytes # (Auto)


 


 1.0 x10^3/uL


(0.0-1.1) 


 





 


Eosinophils # (Auto)


 


 0.4 x10^3/uL


(0.0-0.7) 


 





 


Basophils # (Auto)


 


 0.0 x10^3/uL


(0.0-0.2) 


 





 


Sodium Level


 


 138 mmol/L


(136-145) 


 





 


Potassium Level


 


 4.4 mmol/L


(3.5-5.1) 


 





 


Chloride Level


 


 106 mmol/L


() 


 





 


Carbon Dioxide Level


 


 29 mmol/L


(21-32) 


 





 


Anion Gap  3 (6-14)   


 


Blood Urea Nitrogen


 


 15 mg/dL


(7-20) 


 





 


Creatinine


 


 0.5 mg/dL


(0.6-1.0) 


 





 


Estimated GFR


(Cockcroft-Gault) 


 131.1 


 


 





 


BUN/Creatinine Ratio  30 (6-20)   


 


Glucose Level


 


 123 mg/dL


(70-99) 


 





 


Calcium Level


 


 10.3 mg/dL


(8.5-10.1) 


 





 


Total Bilirubin


 


 0.4 mg/dL


(0.2-1.0) 


 





 


Aspartate Amino Transf


(AST/SGOT) 


 14 U/L (15-37) 


 


 





 


Alanine Aminotransferase


(ALT/SGPT) 


 12 U/L (14-59) 


 


 





 


Alkaline Phosphatase


 


 119 U/L


() 


 





 


Total Protein


 


 5.9 g/dL


(6.4-8.2) 


 





 


Albumin


 


 1.1 g/dL


(3.4-5.0) 


 





 


Albumin/Globulin Ratio  0.2 (1.0-1.7)   








Laboratory Tests








Test


 7/25/20


11:36 7/25/20


17:48 7/25/20


23:49 7/26/20


05:55


 


Glucose (Fingerstick)


 145 mg/dL


(70-99) 132 mg/dL


(70-99) 139 mg/dL


(70-99) 





 


White Blood Count


 


 


 


 14.7 x10^3/uL


(4.0-11.0)


 


Red Blood Count


 


 


 


 2.78 x10^6/uL


(3.50-5.40)


 


Hemoglobin


 


 


 


 7.7 g/dL


(12.0-15.5)


 


Hematocrit


 


 


 


 23.8 %


(36.0-47.0)


 


Mean Corpuscular Volume    86 fL () 


 


Mean Corpuscular Hemoglobin    28 pg (25-35) 


 


Mean Corpuscular Hemoglobin


Concent 


 


 


 33 g/dL


(31-37)


 


Red Cell Distribution Width


 


 


 


 16.0 %


(11.5-14.5)


 


Platelet Count


 


 


 


 563 x10^3/uL


(140-400)


 


Neutrophils (%) (Auto)    83 % (31-73) 


 


Lymphocytes (%) (Auto)    8 % (24-48) 


 


Monocytes (%) (Auto)    7 % (0-9) 


 


Eosinophils (%) (Auto)    3 % (0-3) 


 


Basophils (%) (Auto)    0 % (0-3) 


 


Neutrophils # (Auto)


 


 


 


 12.2 x10^3/uL


(1.8-7.7)


 


Lymphocytes # (Auto)


 


 


 


 1.2 x10^3/uL


(1.0-4.8)


 


Monocytes # (Auto)


 


 


 


 1.0 x10^3/uL


(0.0-1.1)


 


Eosinophils # (Auto)


 


 


 


 0.4 x10^3/uL


(0.0-0.7)


 


Basophils # (Auto)


 


 


 


 0.0 x10^3/uL


(0.0-0.2)


 


Sodium Level


 


 


 


 138 mmol/L


(136-145)


 


Potassium Level


 


 


 


 4.4 mmol/L


(3.5-5.1)


 


Chloride Level


 


 


 


 106 mmol/L


()


 


Carbon Dioxide Level


 


 


 


 29 mmol/L


(21-32)


 


Anion Gap    3 (6-14) 


 


Blood Urea Nitrogen


 


 


 


 15 mg/dL


(7-20)


 


Creatinine


 


 


 


 0.5 mg/dL


(0.6-1.0)


 


Estimated GFR


(Cockcroft-Gault) 


 


 


 131.1 





 


BUN/Creatinine Ratio    30 (6-20) 


 


Glucose Level


 


 


 


 123 mg/dL


(70-99)


 


Calcium Level


 


 


 


 10.3 mg/dL


(8.5-10.1)


 


Total Bilirubin


 


 


 


 0.4 mg/dL


(0.2-1.0)


 


Aspartate Amino Transf


(AST/SGOT) 


 


 


 14 U/L (15-37) 





 


Alanine Aminotransferase


(ALT/SGPT) 


 


 


 12 U/L (14-59) 





 


Alkaline Phosphatase


 


 


 


 119 U/L


()


 


Total Protein


 


 


 


 5.9 g/dL


(6.4-8.2)


 


Albumin


 


 


 


 1.1 g/dL


(3.4-5.0)


 


Albumin/Globulin Ratio    0.2 (1.0-1.7) 


 


Test


 7/26/20


06:00 


 


 





 


Glucose (Fingerstick)


 120 mg/dL


(70-99) 


 


 











Microbiology


7/21/20 Blood Culture - Preliminary, Resulted


          NO GROWTH AFTER 4 DAYS


7/21/20 Gram Stain - Final, Resulted


          


7/21/20 Aerobic and Anaerobic Culture - Preliminary, Resulted


          


7/19/20 Gram Stain Evaluation - Final, Complete


          


7/19/20 Respiratory Culture - Final, Complete


          


7/19/20 Antimicrobic Susceptibility - Final, Complete


          


6/30/20 Gram Stain - Final, Complete


          


6/30/20 Aerobic and Anaerobic Culture - Final, Complete


          


6/30/20 Antimicrobic Susceptibility - Final, Complete


          


6/7/20 Urine Culture - Final, Complete


         


5/30/20 Gram Stain - Final, Complete


          


5/30/20 Aerobic Culture - Final, Complete


Medications





Current Medications


Sodium Chloride 1,000 ml @  1,000 mls/hr Q1H IV  Last administered on 3/16/20at 

03:00;  Start 3/16/20 at 03:00;  Stop 3/16/20 at 03:59;  Status DC


Ondansetron HCl (Zofran) 4 mg 1X  ONCE IVP  Last administered on 3/16/20at 

03:27;  Start 3/16/20 at 03:00;  Stop 3/16/20 at 03:01;  Status DC


Morphine Sulfate (Morphine Sulfate) 4 mg 1X  ONCE IV ;  Start 3/16/20 at 03:00; 

Stop 3/16/20 at 03:01;  Status Cancel


Ketorolac Tromethamine (Toradol 30mg Vial) 30 mg 1X  ONCE IV  Last administered 

on 3/16/20at 02:54;  Start 3/16/20 at 03:00;  Stop 3/16/20 at 03:01;  Status DC


Fentanyl Citrate (Fentanyl 2ml Vial) 25 mcg 1X  ONCE IVP  Last administered on 

3/16/20at 03:23;  Start 3/16/20 at 03:30;  Stop 3/16/20 at 03:31;  Status DC


Fentanyl Citrate (Fentanyl 2ml Vial) 100 mcg STK-MED ONCE .ROUTE ;  Start 

3/16/20 at 03:18;  Stop 3/16/20 at 03:18;  Status DC


Iohexol (Omnipaque 350 Mg/ml) 90 ml 1X  ONCE IV  Last administered on 3/16/20at 

03:25;  Start 3/16/20 at 03:30;  Stop 3/16/20 at 03:31;  Status DC


Info (CONTRAST GIVEN -- Rx MONITORING) 1 each PRN DAILY  PRN MC SEE COMMENTS;  

Start 3/16/20 at 03:30;  Stop 3/18/20 at 03:29;  Status DC


Hydromorphone HCl (Dilaudid) 0.5 mg 1X  ONCE IV  Last administered on 3/16/20at 

03:55;  Start 3/16/20 at 04:30;  Stop 3/16/20 at 04:32;  Status DC


Ondansetron HCl (Zofran) 4 mg PRN Q8HRS  PRN IV NAUSEA/VOMITING 1ST CHOICE;  

Start 3/16/20 at 05:00;  Stop 3/16/20 at 09:27;  Status DC


Morphine Sulfate (Morphine Sulfate) 2 mg PRN Q2HR  PRN IV SEVERE PAIN 7-10 Last 

administered on 3/17/20at 12:26;  Start 3/16/20 at 05:00;  Stop 3/17/20 at 

14:15;  Status DC


Sodium Chloride 1,000 ml @  125 mls/hr Q8H IV  Last administered on 3/16/20at 

20:56;  Start 3/16/20 at 05:00;  Stop 3/17/20 at 04:59;  Status DC


Hydromorphone HCl (Dilaudid) 0.5 mg PRN Q3HRS  PRN IV SEVERE PAIN 7-10 Last 

administered on 3/17/20at 10:06;  Start 3/16/20 at 05:00;  Stop 3/17/20 at 

12:01;  Status DC


Piperacillin Sod/ Tazobactam Sod 4.5 gm/Sodium Chloride 100 ml @  200 mls/hr 1X 

ONCE IV  Last administered on 3/16/20at 05:44;  Start 3/16/20 at 06:00;  Stop 

3/16/20 at 06:29;  Status DC


Ondansetron HCl (Zofran) 4 mg PRN Q4HRS  PRN IV NAUSEA/VOMITING 1ST CHOICE Last 

administered on 7/23/20at 20:49;  Start 3/16/20 at 09:30


Insulin Human Lispro (HumaLOG) 0-9 UNITS Q6HRS SQ  Last administered on 

7/24/20at 12:26;  Start 3/16/20 at 09:30


Dextrose (Dextrose 50%-Water Syringe) 12.5 gm PRN Q15MIN  PRN IV SEE COMMENTS;  

Start 3/16/20 at 09:30


Pantoprazole Sodium (PROTONIX VIAL for IV PUSH) 40 mg DAILYAC IVP  Last 

administered on 7/25/20at 07:56;  Start 3/16/20 at 11:30


Prochlorperazine Edisylate (Compazine) 10 mg PRN Q6HRS  PRN IV NAUSEA/VOMITING, 

2nd CHOICE Last administered on 7/23/20at 13:45;  Start 3/16/20 at 17:45


Atenolol (Tenormin) 100 mg DAILY PO ;  Start 3/17/20 at 09:00;  Stop 3/16/20 at 

20:08;  Status DC


Metoprolol Tartrate (Lopressor Vial) 2.5 mg Q6HRS IVP  Last administered on 

3/17/20at 05:51;  Start 3/16/20 at 20:15;  Stop 3/17/20 at 10:02;  Status DC


Metoprolol Tartrate (Lopressor Vial) 5 mg Q6HRS IVP  Last administered on 

3/26/20at 00:12;  Start 3/17/20 at 10:15;  Stop 3/28/20 at 08:48;  Status DC


Hydromorphone HCl (Dilaudid) 1 mg PRN Q3HRS  PRN IV SEVERE PAIN 7-10 Last 

administered on 3/23/20at 05:13;  Start 3/17/20 at 12:00;  Stop 3/31/20 at 00:2

5;  Status DC


Lidocaine HCl (Buffered Lidocaine 1%) 3 ml STK-MED ONCE .ROUTE ;  Start 3/17/20 

at 12:55;  Stop 3/17/20 at 12:56;  Status DC


Albumin Human 500 ml @  125 mls/hr 1X  ONCE IV  Last administered on 3/17/20at 

14:33;  Start 3/17/20 at 14:30;  Stop 3/17/20 at 18:32;  Status DC


Norepinephrine Bitartrate 8 mg/ Dextrose 258 ml @  17.299 mls/ hr CONT  PRN IV 

PER PROTOCOL Last administered on 4/14/20at 12:48;  Start 3/17/20 at 15:30;  

Stop 4/17/20 at 09:19;  Status DC


Sodium Chloride 1,000 ml @  125 mls/hr Q8H IV  Last administered on 3/17/20at 

21:04;  Start 3/17/20 at 16:00;  Stop 3/18/20 at 02:42;  Status DC


Albumin Human 500 ml @  125 mls/hr PRN BID  PRN IV After every 2L NSS & BP < 

90mm Last administered on 6/30/20at 16:06;  Start 3/17/20 at 16:00;  Stop 7/3/20

at 09:30;  Status DC


Iohexol (Omnipaque 300 Mg/ml) 60 ml 1X  ONCE IV  Last administered on 3/17/20at 

17:20;  Start 3/17/20 at 17:00;  Stop 3/17/20 at 17:01;  Status DC


Info (CONTRAST GIVEN -- Rx MONITORING) 1 each PRN DAILY  PRN MC SEE COMMENTS;  

Start 3/17/20 at 17:00;  Stop 3/19/20 at 16:59;  Status DC


Meropenem 1 gm/ Sodium Chloride 100 ml @  200 mls/hr Q8HRS IV  Last administered

on 3/18/20at 05:45;  Start 3/17/20 at 20:00;  Stop 3/18/20 at 08:48;  Status DC


Furosemide (Lasix) 40 mg 1X  ONCE IVP  Last administered on 3/17/20at 22:12;  

Start 3/17/20 at 22:30;  Stop 3/17/20 at 22:31;  Status DC


Calcium Chloride 1000 mg/Sodium Chloride 110 ml @  220 mls/hr 1X  ONCE IV  Last 

administered on 3/17/20at 22:11;  Start 3/17/20 at 22:30;  Stop 3/17/20 at 

22:59;  Status DC


Albuterol Sulfate (Ventolin Neb Soln) 2.5 mg 1X  ONCE NEB  Last administered on 

3/18/20at 00:56;  Start 3/17/20 at 22:30;  Stop 3/17/20 at 22:31;  Status DC


Insulin Human Regular (HumuLIN R VIAL) 5 unit 1X  ONCE IV  Last administered on 

3/17/20at 22:14;  Start 3/17/20 at 22:30;  Stop 3/17/20 at 22:31;  Status DC


Magnesium Sulfate 50 ml @ 25 mls/hr 1X  ONCE IV  Last administered on 3/18/20at 

02:57;  Start 3/18/20 at 03:00;  Stop 3/18/20 at 04:59;  Status DC


Calcium Gluconate 1000 mg/Sodium Chloride 110 ml @  220 mls/hr 1X  ONCE IV  Last

administered on 3/18/20at 02:46;  Start 3/18/20 at 03:00;  Stop 3/18/20 at 03:2

9;  Status DC


Sodium Chloride 1,000 ml @  200 mls/hr Q5H IV  Last administered on 3/18/20at 

02:46;  Start 3/18/20 at 03:00;  Stop 3/18/20 at 10:21;  Status DC


Calcium Gluconate 1000 mg/Sodium Chloride 110 ml @  220 mls/hr 1X  ONCE IV  Last

administered on 3/18/20at 03:21;  Start 3/18/20 at 03:30;  Stop 3/18/20 at 

03:59;  Status DC


Sodium Bicarbonate 50 meq/Sodium Chloride 1,050 ml @  75 mls/hr Q14H IV  Last 

administered on 3/22/20at 21:10;  Start 3/18/20 at 07:30;  Stop 3/23/20 at 

10:28;  Status DC


Calcium Gluconate 2000 mg/Sodium Chloride 120 ml @  220 mls/hr 1X  ONCE IV  Last

administered on 3/18/20at 09:05;  Start 3/18/20 at 07:30;  Stop 3/18/20 at 

08:02;  Status DC


Lidocaine HCl (Xylocaine-Mpf 1% 2ml Vial) 2 ml STK-MED ONCE .ROUTE ;  Start 

3/18/20 at 08:47;  Stop 3/18/20 at 08:47;  Status DC


Meropenem 500 mg/ Sodium Chloride 50 ml @  100 mls/hr Q12HR IV  Last 

administered on 3/23/20at 21:01;  Start 3/18/20 at 18:00;  Stop 3/24/20 at 

07:58;  Status DC


Lidocaine HCl (Buffered Lidocaine 1%) 3 ml STK-MED ONCE .ROUTE ;  Start 3/18/20 

at 09:46;  Stop 3/18/20 at 09:46;  Status DC


Lidocaine HCl (Buffered Lidocaine 1%) 6 ml 1X  ONCE INJ  Last administered on 

3/18/20at 10:26;  Start 3/18/20 at 10:15;  Stop 3/18/20 at 10:16;  Status DC


Info (Tpn Per Pharmacy) 1 each PRN DAILY  PRN MC SEE COMMENTS Last administered 

on 7/25/20at 12:28;  Start 3/18/20 at 12:00


Sodium Chloride 1,000 ml @  1,000 mls/hr Q1H PRN IV hypotension;  Start 3/18/20 

at 12:07;  Stop 3/18/20 at 18:06;  Status DC


Diphenhydramine HCl (Benadryl) 25 mg 1X PRN  PRN IV ITCHING;  Start 3/18/20 at 

12:15;  Stop 3/19/20 at 12:14;  Status DC


Diphenhydramine HCl (Benadryl) 25 mg 1X PRN  PRN IV ITCHING;  Start 3/18/20 at 

12:15;  Stop 3/19/20 at 12:14;  Status DC


Sodium Chloride 1,000 ml @  400 mls/hr Q2H30M PRN IV PATENCY;  Start 3/18/20 at 

12:07;  Stop 3/19/20 at 00:06;  Status DC


Info (PHARMACY MONITORING -- do not chart) 1 each PRN DAILY  PRN MC SEE 

COMMENTS;  Start 3/18/20 at 12:15;  Stop 3/20/20 at 08:13;  Status DC


Sodium Chloride 90 meq/Calcium Gluconate 10 meq/ Multivitamins 10 ml/Chromium/ 

Copper/Manganese/ Seleni/Zn 1 ml/ Total Parenteral Nutrition/Amino 

Acids/Dextrose/ Fat Emulsion Intravenous 55.005 ml  @ 2.292 mls/hr TPN  CONT IV 

;  Start 3/18/20 at 22:00;  Stop 3/18/20 at 12:33;  Status DC


Info (Tpn Per Pharmacy) 1 each PRN DAILY  PRN MC SEE COMMENTS;  Start 3/18/20 at

12:30;  Status UNV


Sodium Chloride 90 meq/Calcium Gluconate 10 meq/ Multivitamins 10 ml/Chromium/ 

Copper/Manganese/ Seleni/Zn 0.5 ml/ Total Parenteral Nutrition/Amino 

Acids/Dextrose/ Fat Emulsion Intravenous 1,512 ml @  63 mls/hr TPN  CONT IV  

Last administered on 3/18/20at 22:06;  Start 3/18/20 at 22:00;  Stop 3/19/20 at 

21:59;  Status DC


Calcium Carbonate/ Glycine (Tums) 500 mg PRN AFTMEALHC  PRN PO INDIGESTION;  

Start 3/18/20 at 17:45;  Stop 5/13/20 at 10:25;  Status DC


Calcium Gluconate (Calcium Gluconate) 2,000 mg 1X  ONCE IVP  Last administered 

on 3/19/20at 02:19;  Start 3/19/20 at 02:15;  Stop 3/19/20 at 02:16;  Status DC


Calcium Chloride 3000 mg/Sodium Chloride 1,030 ml @  50 mls/hr [id number] IV  Last 

administered on 3/21/20at 02:17;  Start 3/19/20 at 08:00;  Stop 3/21/20 at 

15:23;  Status DC


Lorazepam (Ativan Inj) 1 mg PRN Q4HRS  PRN IVP ANXIETY / AGITATION, 2nd choic 

Last administered on 4/17/20at 03:51;  Start 3/19/20 at 09:00;  Stop 4/17/20 at 

09:19;  Status DC


Sodium Chloride 1,000 ml @  1,000 mls/hr Q1H PRN IV hypotension;  Start 3/19/20 

at 08:56;  Stop 3/19/20 at 14:55;  Status DC


Albumin Human 200 ml @  200 mls/hr 1X PRN  PRN IV Hypotension;  Start 3/19/20 at

09:00;  Stop 3/19/20 at 14:59;  Status DC


Diphenhydramine HCl (Benadryl) 25 mg 1X PRN  PRN IV ITCHING;  Start 3/19/20 at 

09:00;  Stop 3/20/20 at 08:59;  Status DC


Diphenhydramine HCl (Benadryl) 25 mg 1X PRN  PRN IV ITCHING;  Start 3/19/20 at 

09:00;  Stop 3/20/20 at 08:59;  Status DC


Sodium Chloride 1,000 ml @  400 mls/hr Q2H30M PRN IV PATENCY;  Start 3/19/20 at 

08:56;  Stop 3/19/20 at 20:55;  Status DC


Info (PHARMACY MONITORING -- do not chart) 1 each PRN DAILY  PRN MC SEE 

COMMENTS;  Start 3/19/20 at 09:00;  Status UNV


Info (PHARMACY MONITORING -- do not chart) 1 each PRN DAILY  PRN MC SEE 

COMMENTS;  Start 3/19/20 at 09:00;  Stop 3/20/20 at 08:13;  Status DC


Digoxin (Lanoxin) 500 mcg 1X  ONCE IV  Last administered on 3/19/20at 10:04;  

Start 3/19/20 at 10:00;  Stop 3/19/20 at 10:01;  Status DC


Digoxin (Lanoxin) 125 mcg 1X  ONCE IV  Last administered on 3/19/20at 17:10;  

Start 3/19/20 at 18:00;  Stop 3/19/20 at 18:01;  Status DC


Magnesium Sulfate 100 ml @  25 mls/hr 1X  ONCE IV  Last administered on 

3/19/20at 12:48;  Start 3/19/20 at 13:00;  Stop 3/19/20 at 16:59;  Status DC


Sodium Chloride 90 meq/Magnesium Sulfate 10 meq/ Calcium Gluconate 20 meq/ 

Multivitamins 10 ml/Chromium/ Copper/Manganese/ Seleni/Zn 0.5 ml/ Total 

Parenteral Nutrition/Amino Acids/Dextrose/ Fat Emulsion Intravenous 1,512 ml @  

63 mls/hr TPN  CONT IV  Last administered on 3/19/20at 22:25;  Start 3/19/20 at 

22:00;  Stop 3/20/20 at 21:59;  Status DC


Sodium Chloride 1,000 ml @  1,000 mls/hr Q1H PRN IV hypotension;  Start 3/20/20 

at 08:05;  Stop 3/20/20 at 14:04;  Status DC


Albumin Human 200 ml @  200 mls/hr 1X  ONCE IV  Last administered on 3/20/20at 

08:57;  Start 3/20/20 at 08:15;  Stop 3/20/20 at 09:14;  Status DC


Diphenhydramine HCl (Benadryl) 25 mg 1X PRN  PRN IV ITCHING;  Start 3/20/20 at 

08:15;  Stop 3/21/20 at 08:14;  Status DC


Diphenhydramine HCl (Benadryl) 25 mg 1X PRN  PRN IV ITCHING;  Start 3/20/20 at 

08:15;  Stop 3/21/20 at 08:14;  Status DC


Sodium Chloride 1,000 ml @  400 mls/hr Q2H30M PRN IV PATENCY;  Start 3/20/20 at 

08:05;  Stop 3/20/20 at 20:04;  Status DC


Info (PHARMACY MONITORING -- do not chart) 1 each PRN DAILY  PRN MC SEE 

COMMENTS;  Start 3/20/20 at 08:15;  Stop 3/24/20 at 07:57;  Status DC


Sodium Chloride 90 meq/Potassium Chloride 15 meq/ Potassium Phosphate 10 mmol/ 

Magnesium Sulfate 10 meq/Calcium Gluconate 20 meq/ Multivitamins 10 ml/Chromium/

Copper/Manganese/ Seleni/Zn 0.5 ml/ Total Parenteral Nutrition/Amino Acids/

Dextrose/ Fat Emulsion Intravenous 1,512 ml @  63 mls/hr TPN  CONT IV  Last 

administered on 3/20/20at 21:01;  Start 3/20/20 at 22:00;  Stop 3/21/20 at 

21:59;  Status DC


Potassium Chloride/Water 100 ml @  100 mls/hr 1X  ONCE IV  Last administered on 

3/20/20at 14:09;  Start 3/20/20 at 14:00;  Stop 3/20/20 at 14:59;  Status DC


Benzocaine (Hurricaine One) 1 spray 1X  ONCE MM  Last administered on 3/20/20at 

16:38;  Start 3/20/20 at 14:30;  Stop 3/20/20 at 14:31;  Status DC


Lidocaine HCl (Glydo (Lidocaine) Jelly) 1 thomas 1X  ONCE MM  Last administered on 

3/20/20at 16:38;  Start 3/20/20 at 14:30;  Stop 3/20/20 at 14:31;  Status DC


Linezolid/Dextrose 300 ml @  300 mls/hr Q12HR IV  Last administered on 3/26/20at

21:04;  Start 3/20/20 at 20:00;  Stop 3/27/20 at 07:50;  Status DC


Acetaminophen (Tylenol) 650 mg PRN Q6HRS  PRN PO MILD PAIN / TEMP;  Start 

3/21/20 at 03:30;  Stop 3/21/20 at 03:36;  Status DC


Acetaminophen (Tylenol) 650 mg PRN Q6HRS  PRN PEG MILD PAIN / TEMP Last 

administered on 4/16/20at 19:56;  Start 3/21/20 at 03:36;  Stop 5/13/20 at 

10:25;  Status DC


Sodium Chloride 1,000 ml @  1,000 mls/hr Q1H PRN IV hypotension;  Start 3/21/20 

at 07:50;  Stop 3/21/20 at 13:49;  Status DC


Albumin Human 200 ml @  200 mls/hr 1X PRN  PRN IV Hypotension;  Start 3/21/20 at

08:00;  Stop 3/21/20 at 13:59;  Status DC


Sodium Chloride (Normal Saline Flush) 10 ml 1X PRN  PRN IV AP catheter pack;  

Start 3/21/20 at 08:00;  Stop 3/22/20 at 07:59;  Status DC


Sodium Chloride (Normal Saline Flush) 10 ml 1X PRN  PRN IV  catheter pack;  

Start 3/21/20 at 08:00;  Stop 3/22/20 at 07:59;  Status DC


Sodium Chloride 1,000 ml @  400 mls/hr Q2H30M PRN IV PATENCY;  Start 3/21/20 at 

07:50;  Stop 3/21/20 at 19:49;  Status DC


Info (PHARMACY MONITORING -- do not chart) 1 each PRN DAILY  PRN MC SEE MIKEY

TS;  Start 3/21/20 at 08:00;  Status UNV


Info (PHARMACY MONITORING -- do not chart) 1 each PRN DAILY  PRN MC SEE 

COMMENTS;  Start 3/21/20 at 08:00;  Stop 3/23/20 at 08:25;  Status DC


Sodium Chloride 90 meq/Potassium Chloride 15 meq/ Potassium Phosphate 10 mmol/ 

Magnesium Sulfate 10 meq/Calcium Gluconate 20 meq/ Multivitamins 10 ml/Chromium/

Copper/Manganese/ Seleni/Zn 0.5 ml/ Total Parenteral Nutrition/Amino 

Acids/Dextrose/ Fat Emulsion Intravenous 1,512 ml @  63 mls/hr TPN  CONT IV  

Last administered on 3/21/20at 20:57;  Start 3/21/20 at 22:00;  Stop 3/22/20 at 

21:59;  Status DC


Sodium Chloride 90 meq/Potassium Chloride 15 meq/ Potassium Phosphate 15 mmol/ 

Magnesium Sulfate 10 meq/Calcium Gluconate 20 meq/ Multivitamins 10 ml/Chromium/

Copper/Manganese/ Seleni/Zn 0.5 ml/ Total Parenteral Nutrition/Amino 

Acids/Dextrose/ Fat Emulsion Intravenous 1,512 ml @  63 mls/hr TPN  CONT IV ;  S

tart 3/22/20 at 22:00;  Stop 3/22/20 at 14:16;  Status DC


Sodium Chloride 90 meq/Potassium Chloride 15 meq/ Potassium Phosphate 15 mmol/ 

Magnesium Sulfate 10 meq/Calcium Gluconate 20 meq/ Multivitamins 10 ml/Chromium/

Copper/Manganese/ Seleni/Zn 0.5 ml/ Total Parenteral Nutrition/Amino 

Acids/Dextrose/ Fat Emulsion Intravenous 1,200 ml @  50 mls/hr TPN  CONT IV ;  

Start 3/22/20 at 22:00;  Stop 3/22/20 at 14:17;  Status DC


Sodium Chloride 90 meq/Potassium Chloride 15 meq/ Potassium Phosphate 10 mmol/ 

Magnesium Sulfate 10 meq/Calcium Gluconate 20 meq/ Multivitamins 10 ml/Chromium/

Copper/Manganese/ Seleni/Zn 0.5 ml/ Total Parenteral Nutrition/Amino 

Acids/Dextrose/ Fat Emulsion Intravenous 1,200 ml @  50 mls/hr TPN  CONT IV  

Last administered on 3/22/20at 23:29;  Start 3/22/20 at 22:00;  Stop 3/23/20 at 

21:59;  Status DC


Sodium Chloride 1,000 ml @  1,000 mls/hr Q1H PRN IV hypotension;  Start 3/23/20 

at 07:28;  Stop 3/23/20 at 13:27;  Status DC


Albumin Human 200 ml @  200 mls/hr 1X  ONCE IV  Last administered on 3/23/20at 

08:51;  Start 3/23/20 at 07:30;  Stop 3/23/20 at 08:29;  Status DC


Diphenhydramine HCl (Benadryl) 25 mg 1X PRN  PRN IV ITCHING;  Start 3/23/20 at 

07:30;  Stop 3/24/20 at 07:29;  Status DC


Diphenhydramine HCl (Benadryl) 25 mg 1X PRN  PRN IV ITCHING;  Start 3/23/20 at 

07:30;  Stop 3/24/20 at 07:29;  Status DC


Sodium Chloride 1,000 ml @  400 mls/hr Q2H30M PRN IV PATENCY;  Start 3/23/20 at 

07:28;  Stop 3/23/20 at 19:27;  Status DC


Info (PHARMACY MONITORING -- do not chart) 1 each PRN DAILY  PRN MC SEE 

COMMENTS;  Start 3/23/20 at 07:30;  Stop 4/3/20 at 13:01;  Status DC


Metronidazole 100 ml @  100 mls/hr Q6HRS IV  Last administered on 4/8/20at 

06:26;  Start 3/23/20 at 08:30;  Stop 4/8/20 at 09:58;  Status DC


Micafungin Sodium 100 mg/Dextrose 100 ml @  100 mls/hr Q24H IV  Last 

administered on 4/30/20at 08:18;  Start 3/23/20 at 09:00;  Stop 4/30/20 at 

20:58;  Status DC


Propofol 0 ml @ As Directed STK-MED ONCE IV ;  Start 3/23/20 at 07:53;  Stop 

3/23/20 at 07:53;  Status DC


Etomidate (Amidate) 20 mg STK-MED ONCE IV ;  Start 3/23/20 at 07:53;  Stop 

3/23/20 at 07:54;  Status DC


Midazolam HCl (Versed) 5 mg STK-MED ONCE .ROUTE ;  Start 3/23/20 at 07:57;  Stop

3/23/20 at 07:57;  Status DC


Fentanyl Citrate 30 ml @ 0 mls/hr CONT  PRN IV SEE PROTOCOL Last administered on

4/17/20at 06:12;  Start 3/23/20 at 08:15;  Stop 4/17/20 at 09:19;  Status DC


Artificial Tears (Artificial Tears) 1 drop PRN Q1HR  PRN OU DRY EYE, 1st choice;

 Start 3/23/20 at 08:15;  Stop 4/29/20 at 05:31;  Status DC


Midazolam HCl 50 mg/Sodium Chloride 50 ml @ 0 mls/hr CONT  PRN IV SEE PROTOCOL 

Last administered on 3/26/20at 22:39;  Start 3/23/20 at 08:15;  Stop 3/28/20 at 

15:59;  Status DC


Etomidate (Amidate) 8 mg 1X  ONCE IV  Last administered on 3/23/20at 08:33;  

Start 3/23/20 at 08:30;  Stop 3/23/20 at 08:31;  Status DC


Succinylcholine Chloride (Anectine) 120 mg 1X  ONCE IV  Last administered on 

3/23/20at 08:34;  Start 3/23/20 at 08:30;  Stop 3/23/20 at 08:31;  Status DC


Midazolam HCl (Versed) 5 mg 1X  ONCE IV ;  Start 3/23/20 at 08:30;  Stop 3/23/20

at 08:31;  Status DC


Potassium Chloride 15 meq/ Bicarbonate Dialysis Soln w/ out KCl 5,007.5 ml  @ 

1,000 mls/ hr Q5H1M IV  Last administered on 3/24/20at 11:11;  Start 3/23/20 at 

12:00;  Stop 3/24/20 at 11:15;  Status DC


Potassium Chloride 15 meq/ Bicarbonate Dialysis Soln w/ out KCl 5,007.5 ml  @ 

1,000 mls/ hr Q5H1M IV  Last administered on 3/24/20at 11:12;  Start 3/23/20 at 

12:00;  Stop 3/24/20 at 11:17;  Status DC


Potassium Chloride 15 meq/ Bicarbonate Dialysis Soln w/ out KCl 5,007.5 ml  @ 

1,000 mls/ hr Q5H1M IV  Last administered on 3/24/20at 11:11;  Start 3/23/20 at 

12:00;  Stop 3/24/20 at 11:19;  Status DC


Sodium Chloride 90 meq/Potassium Chloride 15 meq/ Potassium Phosphate 10 mmol/ 

Magnesium Sulfate 10 meq/Calcium Gluconate 20 meq/ Multivitamins 10 ml/Chromium/

Copper/Manganese/ Seleni/Zn 0.5 ml/ Total Parenteral Nutrition/Amino 

Acids/Dextrose/ Fat Emulsion Intravenous 1,400 ml @  58.333 mls/ hr TPN  CONT IV

 Last administered on 3/23/20at 21:42;  Start 3/23/20 at 22:00;  Stop 3/24/20 at

21:59;  Status DC


Heparin Sodium (Porcine) (Heparin Sodium) 5,000 unit Q8HRS SQ  Last administered

on 3/28/20at 05:55;  Start 3/23/20 at 15:00;  Stop 3/28/20 at 13:28;  Status DC


Meropenem 500 mg/ Sodium Chloride 50 ml @  100 mls/hr Q6HRS IV  Last 

administered on 3/25/20at 06:00;  Start 3/24/20 at 09:00;  Stop 3/25/20 at 

07:29;  Status DC


Potassium Phosphate 20 mmol/ Sodium Chloride 106.6667 ml @  51.667 m... 1X  ONCE

IV  Last administered on 3/24/20at 11:22;  Start 3/24/20 at 10:15;  Stop 3/24/20

at 12:18;  Status DC


Acetaminophen (Tylenol Supp) 650 mg PRN Q6HRS  PRN OK MILD PAIN / TEMP > 100.3'F

Last administered on 7/25/20at 19:52;  Start 3/24/20 at 10:30


Potassium Chloride/Water 100 ml @  100 mls/hr Q1H IV  Last administered on 

3/24/20at 12:12;  Start 3/24/20 at 11:00;  Stop 3/24/20 at 12:59;  Status DC


Potassium Chloride 20 meq/ Bicarbonate Dialysis Soln w/ out KCl 5,010 ml @  

1,000 mls/hr Q5H1M IV  Last administered on 3/25/20at 08:48;  Start 3/24/20 at 

12:00;  Stop 3/25/20 at 13:03;  Status DC


Potassium Chloride 20 meq/ Bicarbonate Dialysis Soln w/ out KCl 5,010 ml @  

1,000 mls/hr Q5H1M IV  Last administered on 3/29/20at 14:52;  Start 3/24/20 at 

11:30;  Stop 3/29/20 at 19:59;  Status DC


Potassium Chloride 20 meq/ Bicarbonate Dialysis Soln w/ out KCl 5,010 ml @  

1,000 mls/hr Q5H1M IV  Last administered on 3/29/20at 14:53;  Start 3/24/20 at 

11:30;  Stop 3/29/20 at 19:59;  Status DC


Sodium Chloride 90 meq/Potassium Chloride 15 meq/ Potassium Phosphate 15 mmol/ 

Magnesium Sulfate 10 meq/Calcium Gluconate 15 meq/ Multivitamins 10 ml/Chromium/

Copper/Manganese/ Seleni/Zn 0.5 ml/ Total Parenteral Nutrition/Amino 

Acids/Dextrose/ Fat Emulsion Intravenous 1,400 ml @  58.333 mls/ hr TPN  CONT IV

 Last administered on 3/24/20at 22:17;  Start 3/24/20 at 22:00;  Stop 3/25/20 at

21:59;  Status DC


Cefepime HCl (Maxipime) 2 gm Q12HR IVP  Last administered on 4/7/20at 20:56;  

Start 3/25/20 at 09:00;  Stop 4/8/20 at 09:58;  Status DC


Daptomycin 500 mg/ Sodium Chloride 50 ml @  100 mls/hr Q48H IV  Last 

administered on 4/10/20at 09:57;  Start 3/25/20 at 08:30;  Stop 4/10/20 at 

10:07;  Status DC


Lidocaine HCl (Buffered Lidocaine 1%) 3 ml 1X  ONCE INJ  Last administered on 

3/25/20at 10:27;  Start 3/25/20 at 10:30;  Stop 3/25/20 at 10:31;  Status DC


Potassium Phosphate 20 mmol/ Sodium Chloride 106.6667 ml @  51.667 m... 1X  ONCE

IV  Last administered on 3/25/20at 12:51;  Start 3/25/20 at 13:00;  Stop 3/25/20

at 15:03;  Status DC


Sodium Chloride 90 meq/Potassium Chloride 15 meq/ Potassium Phosphate 18 mmol/ 

Magnesium Sulfate 8 meq/Calcium Gluconate 15 meq/ Multivitamins 10 ml/Chromium/ 

Copper/Manganese/ Seleni/Zn 0.5 ml/ Total Parenteral Nutrition/Amino 

Acids/Dextrose/ Fat Emulsion Intravenous 1,400 ml @  58.333 mls/ hr TPN  CONT IV

 Last administered on 3/25/20at 22:16;  Start 3/25/20 at 22:00;  Stop 3/26/20 at

21:59;  Status DC


Potassium Chloride 20 meq/ Bicarbonate Dialysis Soln w/ out KCl 5,010 ml @  

1,000 mls/hr Q5H1M IV  Last administered on 3/29/20at 14:54;  Start 3/25/20 at 

16:00;  Stop 3/29/20 at 19:59;  Status DC


Multi-Ingred Cream/Lotion/Oil/ Oint (Artificial Tears Eye Ointment) 1 thomas PRN 

Q1HR  PRN OU DRY EYE, 2nd choice Last administered on 4/13/20at 08:19;  Start 

3/25/20 at 17:30;  Stop 6/3/20 at 14:39;  Status DC


Sodium Chloride 90 meq/Potassium Chloride 15 meq/ Potassium Phosphate 18 mmol/ 

Magnesium Sulfate 8 meq/Calcium Gluconate 15 meq/ Multivitamins 10 ml/Chromium/ 

Copper/Manganese/ Seleni/Zn 0.5 ml/ Total Parenteral Nutrition/Amino 

Acids/Dextrose/ Fat Emulsion Intravenous 1,400 ml @  58.333 mls/ hr TPN  CONT IV

 Last administered on 3/26/20at 22:00;  Start 3/26/20 at 22:00;  Stop 3/27/20 at

21:59;  Status DC


Albumin Human 500 ml @  125 mls/hr 1X  ONCE IV ;  Start 3/26/20 at 14:15;  Stop 

3/26/20 at 18:14;  Status DC


Sodium Chloride 90 meq/Potassium Chloride 15 meq/ Potassium Phosphate 18 mmol/ 

Magnesium Sulfate 8 meq/Calcium Gluconate 15 meq/ Multivitamins 10 ml/Chromium/ 

Copper/Manganese/ Seleni/Zn 0.5 ml/ Insulin Human Regular 10 unit/ Total 

Parenteral Nutrition/Amino Acids/Dextrose/ Fat Emulsion Intravenous 1,400 ml @  

58.333 mls/ hr TPN  CONT IV  Last administered on 3/27/20at 21:43;  Start 

3/27/20 at 22:00;  Stop 3/28/20 at 21:59;  Status DC


Lidocaine HCl (Buffered Lidocaine 1%) 3 ml STK-MED ONCE .ROUTE ;  Start 3/25/20 

at 10:00;  Stop 3/27/20 at 13:57;  Status DC


Midazolam HCl 100 mg/Sodium Chloride 100 ml @ 7 mls/hr CONT  PRN IV SEE PROTOCOL

Last administered on 4/8/20at 15:35;  Start 3/28/20 at 16:00;  Stop 6/3/20 at 

14:38;  Status DC


Sodium Chloride 90 meq/Potassium Chloride 15 meq/ Potassium Phosphate 18 mmol/ 

Magnesium Sulfate 8 meq/Calcium Gluconate 15 meq/ Multivitamins 10 ml/Chromium/ 

Copper/Manganese/ Seleni/Zn 0.5 ml/ Insulin Human Regular 15 unit/ Total 

Parenteral Nutrition/Amino Acids/Dextrose/ Fat Emulsion Intravenous 1,400 ml @  

58.333 mls/ hr TPN  CONT IV  Last administered on 3/28/20at 20:34;  Start 

3/28/20 at 22:00;  Stop 3/29/20 at 21:59;  Status DC


Info ([facility] Electrolyte Protocol) 1 ea CONT PRN  PRN MC PER PROTOCOL;  Start 

3/29/20 at 13:15


Sodium Chloride 90 meq/Potassium Chloride 15 meq/ Potassium Phosphate 18 mmol/ 

Magnesium Sulfate 8 meq/Calcium Gluconate 15 meq/ Multivitamins 10 ml/Chromium/ 

Copper/Manganese/ Seleni/Zn 0.5 ml/ Insulin Human Regular 15 unit/ Total 

Parenteral Nutrition/Amino Acids/Dextrose/ Fat Emulsion Intravenous 1,400 ml @  

58.333 mls/ hr TPN  CONT IV  Last administered on 3/29/20at 22:05;  Start 

3/29/20 at 22:00;  Stop 3/30/20 at 21:59;  Status DC


Potassium Chloride 15 meq/ Bicarbonate Dialysis Soln w/ out KCl 5,007.5 ml  @ 

1,000 mls/ hr Q5H1M IV  Last administered on 4/1/20at 18:14;  Start 3/29/20 at 

20:00;  Stop 4/2/20 at 13:08;  Status DC


Potassium Chloride 15 meq/ Bicarbonate Dialysis Soln w/ out KCl 5,007.5 ml  @ 

1,000 mls/ hr Q5H1M IV  Last administered on 4/1/20at 18:14;  Start 3/29/20 at 

20:00;  Stop 4/2/20 at 13:08;  Status DC


Potassium Chloride 15 meq/ Bicarbonate Dialysis Soln w/ out KCl 5,007.5 ml  @ 

1,000 mls/ hr Q5H1M IV  Last administered on 4/1/20at 18:14;  Start 3/29/20 at 

20:00;  Stop 4/2/20 at 13:08;  Status DC


Iohexol (Omnipaque 240 Mg/ml) 30 ml 1X  ONCE PO  Last administered on 3/30/20at 

11:30;  Start 3/30/20 at 11:30;  Stop 3/30/20 at 11:33;  Status DC


Info (CONTRAST GIVEN -- Rx MONITORING) 1 each PRN DAILY  PRN MC SEE COMMENTS;  

Start 3/30/20 at 11:45;  Stop 4/1/20 at 11:44;  Status DC


Sodium Chloride 90 meq/Potassium Chloride 15 meq/ Potassium Phosphate 18 mmol/ 

Magnesium Sulfate 8 meq/Calcium Gluconate 15 meq/ Multivitamins 10 ml/Chromium/ 

Copper/Manganese/ Seleni/Zn 0.5 ml/ Insulin Human Regular 15 unit/ Total 

Parenteral Nutrition/Amino Acids/Dextrose/ Fat Emulsion Intravenous 1,400 ml @  

58.333 mls/ hr TPN  CONT IV  Last administered on 3/30/20at 21:47;  Start 

3/30/20 at 22:00;  Stop 3/31/20 at 21:59;  Status DC


Sodium Chloride 90 meq/Potassium Chloride 15 meq/ Potassium Phosphate 18 mmol/ 

Magnesium Sulfate 8 meq/Calcium Gluconate 15 meq/ Multivitamins 10 ml/Chromium/ 

Copper/Manganese/ Seleni/Zn 0.5 ml/ Insulin Human Regular 20 unit/ Total Pare

nteral Nutrition/Amino Acids/Dextrose/ Fat Emulsion Intravenous 1,400 ml @  

58.333 mls/ hr TPN  CONT IV  Last administered on 3/31/20at 21:36;  Start 

3/31/20 at 22:00;  Stop 4/1/20 at 21:59;  Status DC


Alteplase, Recombinant (Cathflo For Central Catheter Clearance) 1 mg 1X  ONCE 

INT CAT  Last administered on 3/31/20at 20:03;  Start 3/31/20 at 19:30;  Stop 

3/31/20 at 19:46;  Status DC


Alteplase, Recombinant (Cathflo For Central Catheter Clearance) 1 mg 1X  ONCE 

INT CAT  Last administered on 3/31/20at 22:05;  Start 3/31/20 at 22:00;  Stop 

3/31/20 at 22:01;  Status DC


Sodium Chloride 90 meq/Potassium Chloride 15 meq/ Potassium Phosphate 18 mmol/ 

Magnesium Sulfate 8 meq/Calcium Gluconate 15 meq/ Multivitamins 10 ml/Chromium/ 

Copper/Manganese/ Seleni/Zn 0.5 ml/ Insulin Human Regular 20 unit/ Total 

Parenteral Nutrition/Amino Acids/Dextrose/ Fat Emulsion Intravenous 1,400 ml @  

58.333 mls/ hr TPN  CONT IV  Last administered on 4/1/20at 21:30;  Start 4/1/20 

at 22:00;  Stop 4/2/20 at 21:59;  Status DC


Dexmedetomidine HCl 400 mcg/ Sodium Chloride 100 ml @ 0 mls/hr CONT  PRN IV 

ANXIETY / AGITATION Last administered on 5/30/20at 12:57;  Start 4/2/20 at 

08:15;  Stop 5/30/20 at 18:31;  Status DC


Sodium Chloride 500 ml @  500 mls/hr 1X PRN  PRN IV ELEVATED BP, SEE COMMENTS;  

Start 4/2/20 at 08:15


Atropine Sulfate (ATROPINE 0.5mg SYRINGE) 0.5 mg PRN Q5MIN  PRN IV SEE COMMENTS;

 Start 4/2/20 at 08:15


Furosemide (Lasix) 20 mg 1X  ONCE IVP  Last administered on 4/2/20at 08:19;  

Start 4/2/20 at 08:15;  Stop 4/2/20 at 08:16;  Status DC


Lidocaine HCl (Buffered Lidocaine 1%) 3 ml STK-MED ONCE .ROUTE ;  Start 4/2/20 

at 08:39;  Stop 4/2/20 at 08:39;  Status DC


Lidocaine HCl (Buffered Lidocaine 1%) 6 ml 1X  ONCE INJ  Last administered on 

4/2/20at 09:05;  Start 4/2/20 at 09:00;  Stop 4/2/20 at 09:06;  Status DC


Sodium Chloride 90 meq/Potassium Chloride 15 meq/ Potassium Phosphate 18 mmol/ 

Magnesium Sulfate 8 meq/Calcium Gluconate 15 meq/ Multivitamins 10 ml/Chromium/ 

Copper/Manganese/ Seleni/Zn 0.5 ml/ Insulin Human Regular 20 unit/ Total 

Parenteral Nutrition/Amino Acids/Dextrose/ Fat Emulsion Intravenous 1,400 ml @  

58.333 mls/ hr TPN  CONT IV  Last administered on 4/2/20at 22:45;  Start 4/2/20 

at 22:00;  Stop 4/3/20 at 21:59;  Status DC


Sodium Chloride 1,000 ml @  1,000 mls/hr Q1H PRN IV hypotension;  Start 4/3/20 

at 07:30;  Stop 4/3/20 at 13:29;  Status DC


Albumin Human 200 ml @  200 mls/hr 1X PRN  PRN IV Hypotension Last administered 

on 4/3/20at 09:36;  Start 4/3/20 at 07:30;  Stop 4/3/20 at 13:29;  Status DC


Sodium Chloride (Normal Saline Flush) 10 ml 1X PRN  PRN IV AP catheter pack;  

Start 4/3/20 at 07:30;  Stop 4/3/20 at 21:29;  Status DC


Sodium Chloride (Normal Saline Flush) 10 ml 1X PRN  PRN IV  catheter pack;  

Start 4/3/20 at 07:30;  Stop 4/4/20 at 07:29;  Status DC


Sodium Chloride 1,000 ml @  400 mls/hr Q2H30M PRN IV PATENCY;  Start 4/3/20 at 

07:30;  Stop 4/3/20 at 19:29;  Status DC


Info (PHARMACY MONITORING -- do not chart) 1 each PRN DAILY  PRN MC SEE 

COMMENTS;  Start 4/3/20 at 07:30;  Stop 4/3/20 at 13:02;  Status DC


Info (PHARMACY MONITORING -- do not chart) 1 each PRN DAILY  PRN MC SEE 

COMMENTS;  Start 4/3/20 at 07:30;  Stop 4/5/20 at 12:45;  Status DC


Sodium Chloride 90 meq/Potassium Chloride 15 meq/ Potassium Phosphate 10 mmol/ 

Magnesium Sulfate 8 meq/Calcium Gluconate 15 meq/ Multivitamins 10 ml/Chromium/ 

Copper/Manganese/ Seleni/Zn 0.5 ml/ Insulin Human Regular 25 unit/ Total 

Parenteral Nutrition/Amino Acids/Dextrose/ Fat Emulsion Intravenous 1,400 ml @  

58.333 mls/ hr TPN  CONT IV  Last administered on 4/3/20at 22:19;  Start 4/3/20 

at 22:00;  Stop 4/4/20 at 21:59;  Status DC


Heparin Sodium (Porcine) (Heparin Sodium) 5,000 unit Q12HR SQ  Last administered

on 4/26/20at 08:59;  Start 4/3/20 at 21:00;  Stop 4/26/20 at 10:05;  Status DC


Ondansetron HCl (Zofran) 4 mg PRN Q6HRS  PRN IV NAUSEA/VOMITING;  Start 4/6/20 

at 07:00;  Stop 4/7/20 at 06:59;  Status DC


Fentanyl Citrate (Fentanyl 2ml Vial) 25 mcg PRN Q5MIN  PRN IV MILD PAIN 1-3;  

Start 4/6/20 at 07:00;  Stop 4/7/20 at 06:59;  Status DC


Fentanyl Citrate (Fentanyl 2ml Vial) 50 mcg PRN Q5MIN  PRN IV MODERATE TO SEVERE

PAIN;  Start 4/6/20 at 07:00;  Stop 4/7/20 at 06:59;  Status DC


Ringer's Solution 1,000 ml @  30 mls/hr Q24H IV ;  Start 4/6/20 at 07:00;  Stop 

4/6/20 at 18:59;  Status DC


Lidocaine HCl (Xylocaine-Mpf 1% 2ml Vial) 2 ml PRN 1X  PRN ID PRIOR TO IV START;

 Start 4/6/20 at 07:00;  Stop 4/7/20 at 06:59;  Status DC


Prochlorperazine Edisylate (Compazine) 5 mg PACU PRN  PRN IV NAUSEA, MRX1;  

Start 4/6/20 at 07:00;  Stop 4/7/20 at 06:59;  Status DC


Sodium Chloride 1,000 ml @  1,000 mls/hr Q1H PRN IV hypotension;  Start 4/4/20 

at 09:10;  Stop 4/4/20 at 15:09;  Status DC


Albumin Human 200 ml @  200 mls/hr 1X PRN  PRN IV Hypotension Last administered 

on 4/4/20at 10:10;  Start 4/4/20 at 09:15;  Stop 4/4/20 at 15:14;  Status DC


Sodium Chloride 1,000 ml @  400 mls/hr Q2H30M PRN IV PATENCY;  Start 4/4/20 at 

09:10;  Stop 4/4/20 at 21:09;  Status DC


Info (PHARMACY MONITORING -- do not chart) 1 each PRN DAILY  PRN MC SEE 

COMMENTS;  Start 4/4/20 at 09:15;  Stop 4/5/20 at 12:45;  Status DC


Info (PHARMACY MONITORING -- do not chart) 1 each PRN DAILY  PRN MC SEE 

COMMENTS;  Start 4/4/20 at 09:15;  Stop 4/5/20 at 12:45;  Status DC


Sodium Chloride 90 meq/Potassium Chloride 15 meq/ Potassium Phosphate 10 mmol/ 

Magnesium Sulfate 8 meq/Calcium Gluconate 15 meq/ Multivitamins 10 ml/Chromium/ 

Copper/Manganese/ Seleni/Zn 0.5 ml/ Insulin Human Regular 25 unit/ Total 

Parenteral Nutrition/Amino Acids/Dextrose/ Fat Emulsion Intravenous 1,400 ml @  

58.333 mls/ hr TPN  CONT IV  Last administered on 4/4/20at 22:10;  Start 4/4/20 

at 22:00;  Stop 4/5/20 at 21:59;  Status DC


Magnesium Sulfate 50 ml @ 25 mls/hr PRN DAILY  PRN IV for Mag < 1.7 on am labs 

Last administered on 6/18/20at 10:57;  Start 4/5/20 at 09:15


Sodium Chloride 90 meq/Potassium Chloride 15 meq/ Potassium Phosphate 10 mmol/ 

Magnesium Sulfate 8 meq/Calcium Gluconate 15 meq/ Multivitamins 10 ml/Chromium/ 

Copper/Manganese/ Seleni/Zn 0.5 ml/ Insulin Human Regular 25 unit/ Total 

Parenteral Nutrition/Amino Acids/Dextrose/ Fat Emulsion Intravenous 1,400 ml @  

58.333 mls/ hr TPN  CONT IV  Last administered on 4/5/20at 21:20;  Start 4/5/20 

at 22:00;  Stop 4/6/20 at 21:59;  Status DC


Sodium Chloride 1,000 ml @  1,000 mls/hr Q1H PRN IV hypotension;  Start 4/5/20 

at 12:23;  Stop 4/5/20 at 18:22;  Status DC


Albumin Human 200 ml @  200 mls/hr 1X  ONCE IV  Last administered on 4/5/20at 

13:34;  Start 4/5/20 at 12:30;  Stop 4/5/20 at 13:29;  Status DC


Diphenhydramine HCl (Benadryl) 25 mg 1X PRN  PRN IV ITCHING;  Start 4/5/20 at 

12:30;  Stop 4/6/20 at 12:29;  Status DC


Diphenhydramine HCl (Benadryl) 25 mg 1X PRN  PRN IV ITCHING;  Start 4/5/20 at 

12:30;  Stop 4/6/20 at 12:29;  Status DC


Info (PHARMACY MONITORING -- do not chart) 1 each PRN DAILY  PRN MC SEE 

COMMENTS;  Start 4/5/20 at 12:30;  Status Cancel


Bupivacaine HCl/ Epinephrine Bitart (Sensorcain-Epi 0.5%-1:901041 Mpf) 30 ml 

STK-MED ONCE .ROUTE  Last administered on 4/6/20at 11:44;  Start 4/6/20 at 

11:00;  Stop 4/6/20 at 11:01;  Status DC


Cellulose (Surgicel Fibrillar 1x2) 1 each STK-MED ONCE .ROUTE ;  Start 4/6/20 at

11:00;  Stop 4/6/20 at 11:01;  Status DC


Sodium Chloride 90 meq/Potassium Chloride 15 meq/ Potassium Phosphate 10 mmol/ 

Magnesium Sulfate 12 meq/Calcium Gluconate 15 meq/ Multivitamins 10 ml/Chromium/

Copper/Manganese/ Seleni/Zn 0.5 ml/ Insulin Human Regular 25 unit/ Total 

Parenteral Nutrition/Amino Acids/Dextrose/ Fat Emulsion Intravenous 1,400 ml @  

58.333 mls/ hr TPN  CONT IV  Last administered on 4/6/20at 22:24;  Start 4/6/20 

at 22:00;  Stop 4/7/20 at 21:59;  Status DC


Propofol 20 ml @ As Directed STK-MED ONCE IV ;  Start 4/6/20 at 11:07;  Stop 

4/6/20 at 11:07;  Status DC


Cellulose (Surgicel Hemostat 4x8) 1 each STK-MED ONCE .ROUTE  Last administered 

on 4/6/20at 11:44;  Start 4/6/20 at 11:55;  Stop 4/6/20 at 11:56;  Status DC


Sevoflurane (Ultane) 60 ml STK-MED ONCE IH ;  Start 4/6/20 at 12:46;  Stop 

4/6/20 at 12:46;  Status DC


Sodium Chloride 1,000 ml @  1,000 mls/hr Q1H PRN IV hypotension;  Start 4/6/20 

at 13:51;  Stop 4/6/20 at 19:50;  Status DC


Albumin Human 200 ml @  200 mls/hr 1X PRN  PRN IV Hypotension Last administered 

on 4/6/20at 14:51;  Start 4/6/20 at 14:00;  Stop 4/6/20 at 19:59;  Status DC


Diphenhydramine HCl (Benadryl) 25 mg 1X PRN  PRN IV ITCHING;  Start 4/6/20 at 

14:00;  Stop 4/7/20 at 13:59;  Status DC


Diphenhydramine HCl (Benadryl) 25 mg 1X PRN  PRN IV ITCHING;  Start 4/6/20 at 

14:00;  Stop 4/7/20 at 13:59;  Status DC


Sodium Chloride 1,000 ml @  400 mls/hr Q2H30M PRN IV PATENCY;  Start 4/6/20 at 

13:51;  Stop 4/7/20 at 01:50;  Status DC


Info (PHARMACY MONITORING -- do not chart) 1 each PRN DAILY  PRN MC SEE 

COMMENTS;  Start 4/6/20 at 14:00;  Stop 4/9/20 at 08:16;  Status DC


Heparin Sodium (Porcine) (Hep Lock Adult) 500 unit STK-MED ONCE IVP ;  Start 

4/7/20 at 09:29;  Stop 4/7/20 at 09:30;  Status DC


Sodium Chloride 1,000 ml @  1,000 mls/hr Q1H PRN IV hypotension;  Start 4/7/20 

at 10:43;  Stop 4/7/20 at 16:42;  Status DC


Sodium Chloride 1,000 ml @  400 mls/hr Q2H30M PRN IV PATENCY;  Start 4/7/20 at 

10:43;  Stop 4/7/20 at 22:42;  Status DC


Info (PHARMACY MONITORING -- do not chart) 1 each PRN DAILY  PRN MC SEE 

COMMENTS;  Start 4/7/20 at 10:45;  Status UNV


Info (PHARMACY MONITORING -- do not chart) 1 each PRN DAILY  PRN MC SEE 

COMMENTS;  Start 4/7/20 at 10:45;  Status UNV


Sodium Chloride 90 meq/Potassium Chloride 15 meq/ Magnesium Sulfate 12 m

eq/Calcium Gluconate 15 meq/ Multivitamins 10 ml/Chromium/ Copper/Manganese/ 

Seleni/Zn 0.5 ml/ Insulin Human Regular 25 unit/ Total Parenteral 

Nutrition/Amino Acids/Dextrose/ Fat Emulsion Intravenous 1,400 ml @  58.333 mls/

hr TPN  CONT IV  Last administered on 4/7/20at 22:13;  Start 4/7/20 at 22:00;  

Stop 4/8/20 at 21:59;  Status DC


Sodium Chloride 1,000 ml @  1,000 mls/hr Q1H PRN IV hypotension;  Start 4/8/20 

at 07:50;  Stop 4/8/20 at 13:49;  Status DC


Albumin Human 200 ml @  200 mls/hr 1X  ONCE IV ;  Start 4/8/20 at 08:00;  Stop 

4/8/20 at 08:53;  Status DC


Diphenhydramine HCl (Benadryl) 25 mg 1X PRN  PRN IV ITCHING;  Start 4/8/20 at 

08:00;  Stop 4/9/20 at 07:59;  Status DC


Diphenhydramine HCl (Benadryl) 25 mg 1X PRN  PRN IV ITCHING;  Start 4/8/20 at 

08:00;  Stop 4/9/20 at 07:59;  Status DC


Info (PHARMACY MONITORING -- do not chart) 1 each PRN DAILY  PRN MC SEE 

COMMENTS;  Start 4/8/20 at 08:00;  Stop 4/9/20 at 08:16;  Status DC


Albumin Human 50 ml @ 50 mls/hr 1X  ONCE IV ;  Start 4/8/20 at 08:53;  Stop 4/8/ 20 at 08:56;  Status DC


Albumin Human 200 ml @  50 mls/hr PRN 1X  PRN IV HYPOTENSION Last administered 

on 4/14/20at 11:54;  Start 4/8/20 at 09:00;  Stop 5/21/20 at 11:14;  Status DC


Meropenem 500 mg/ Sodium Chloride 50 ml @  100 mls/hr Q12H IV  Last administered

on 4/28/20at 10:45;  Start 4/8/20 at 10:00;  Stop 4/28/20 at 12:37;  Status DC


Sodium Chloride 90 meq/Magnesium Sulfate 12 meq/ Calcium Gluconate 15 meq/ 

Multivitamins 10 ml/Chromium/ Copper/Manganese/ Seleni/Zn 0.5 ml/ Insulin Human 

Regular 25 unit/ Total Parenteral Nutrition/Amino Acids/Dextrose/ Fat Emulsion 

Intravenous 1,400 ml @  58.333 mls/ hr TPN  CONT IV  Last administered on 

4/8/20at 21:41;  Start 4/8/20 at 22:00;  Stop 4/9/20 at 21:59;  Status DC


Sodium Chloride 1,000 ml @  1,000 mls/hr Q1H PRN IV hypotension;  Start 4/9/20 

at 07:58;  Stop 4/9/20 at 13:57;  Status DC


Albumin Human 200 ml @  200 mls/hr 1X PRN  PRN IV Hypotension Last administered 

on 4/9/20at 09:30;  Start 4/9/20 at 08:00;  Stop 4/9/20 at 13:59;  Status DC


Sodium Chloride 1,000 ml @  400 mls/hr Q2H30M PRN IV PATENCY;  Start 4/9/20 at 

07:58;  Stop 4/9/20 at 19:57;  Status DC


Info (PHARMACY MONITORING -- do not chart) 1 each PRN DAILY  PRN MC SEE 

COMMENTS;  Start 4/9/20 at 08:00;  Status Cancel


Info (PHARMACY MONITORING -- do not chart) 1 each PRN DAILY  PRN MC SEE 

COMMENTS;  Start 4/9/20 at 08:15;  Status UNV


Sodium Chloride 90 meq/Potassium Phosphate 5 mmol/ Magnesium Sulfate 12 

meq/Calcium Gluconate 15 meq/ Multivitamins 10 ml/Chromium/ Copper/Manganese/ 

Seleni/Zn 0.5 ml/ Insulin Human Regular 30 unit/ Total Parenteral 

Nutrition/Amino Acids/Dextrose/ Fat Emulsion Intravenous 1,400 ml @  58.333 mls/

hr TPN  CONT IV  Last administered on 4/9/20at 22:08;  Start 4/9/20 at 22:00;  

Stop 4/10/20 at 21:59;  Status DC


Linezolid/Dextrose 300 ml @  300 mls/hr Q12HR IV  Last administered on 4/20/20at

20:40;  Start 4/10/20 at 11:00;  Stop 4/21/20 at 08:10;  Status DC


Sodium Chloride 90 meq/Potassium Phosphate 15 mmol/ Magnesium Sulfate 12 

meq/Calcium Gluconate 15 meq/ Multivitamins 10 ml/Chromium/ Copper/Manganese/ 

Seleni/Zn 0.5 ml/ Insulin Human Regular 30 unit/ Total Parenteral 

Nutrition/Amino Acids/Dextrose/ Fat Emulsion Intravenous 1,400 ml @  58.333 mls/

hr TPN  CONT IV  Last administered on 4/10/20at 21:49;  Start 4/10/20 at 22:00; 

Stop 4/11/20 at 21:59;  Status DC


Sodium Chloride 90 meq/Potassium Phosphate 15 mmol/ Magnesium Sulfate 12 

meq/Calcium Gluconate 15 meq/ Multivitamins 10 ml/Chromium/ Copper/Manganese/ 

Seleni/Zn 0.5 ml/ Insulin Human Regular 40 unit/ Total Parenteral Nutrition

/Amino Acids/Dextrose/ Fat Emulsion Intravenous 1,400 ml @  58.333 mls/ hr TPN  

CONT IV  Last administered on 4/11/20at 21:21;  Start 4/11/20 at 22:00;  Stop 

4/12/20 at 21:59;  Status DC


Sodium Chloride 1,000 ml @  1,000 mls/hr Q1H PRN IV hypotension;  Start 4/11/20 

at 13:26;  Stop 4/11/20 at 19:25;  Status DC


Albumin Human 200 ml @  200 mls/hr 1X PRN  PRN IV Hypotension Last administered 

on 4/11/20at 15:00;  Start 4/11/20 at 13:30;  Stop 4/11/20 at 19:29;  Status DC


Sodium Chloride (Normal Saline Flush) 10 ml 1X PRN  PRN IV AP catheter pack;  

Start 4/11/20 at 13:30;  Stop 4/12/20 at 13:29;  Status DC


Sodium Chloride (Normal Saline Flush) 10 ml 1X PRN  PRN IV  catheter pack;  

Start 4/11/20 at 13:30;  Stop 4/12/20 at 13:29;  Status DC


Sodium Chloride 1,000 ml @  400 mls/hr Q2H30M PRN IV PATENCY;  Start 4/11/20 at 

13:26;  Stop 4/12/20 at 01:25;  Status DC


Info (PHARMACY MONITORING -- do not chart) 1 each PRN DAILY  PRN MC SEE 

COMMENTS;  Start 4/11/20 at 13:30;  Stop 4/11/20 at 13:33;  Status DC


Info (PHARMACY MONITORING -- do not chart) 1 each PRN DAILY  PRN MC SEE 

COMMENTS;  Start 4/11/20 at 13:30;  Stop 4/11/20 at 13:34;  Status DC


Sodium Chloride 90 meq/Potassium Phosphate 19 mmol/ Magnesium Sulfate 12 

meq/Calcium Gluconate 15 meq/ Multivitamins 10 ml/Chromium/ Copper/Manganese/ 

Seleni/Zn 0.5 ml/ Insulin Human Regular 40 unit/ Total Parenteral 

Nutrition/Amino Acids/Dextrose/ Fat Emulsion Intravenous 1,400 ml @  58.333 mls/

hr TPN  CONT IV  Last administered on 4/12/20at 21:54;  Start 4/12/20 at 22:00; 

Stop 4/13/20 at 21:59;  Status DC


Sodium Chloride 1,000 ml @  1,000 mls/hr Q1H PRN IV hypotension;  Start 4/13/20 

at 09:35;  Stop 4/13/20 at 15:34;  Status DC


Albumin Human 200 ml @  200 mls/hr 1X PRN  PRN IV Hypotension;  Start 4/13/20 at

09:45;  Stop 4/13/20 at 15:44;  Status DC


Diphenhydramine HCl (Benadryl) 25 mg 1X PRN  PRN IV ITCHING;  Start 4/13/20 at 

09:45;  Stop 4/14/20 at 09:44;  Status DC


Diphenhydramine HCl (Benadryl) 25 mg 1X PRN  PRN IV ITCHING;  Start 4/13/20 at 

09:45;  Stop 4/14/20 at 09:44;  Status DC


Sodium Chloride 1,000 ml @  400 mls/hr Q2H30M PRN IV PATENCY;  Start 4/13/20 at 

09:35;  Stop 4/13/20 at 21:34;  Status DC


Info (PHARMACY MONITORING -- do not chart) 1 each PRN DAILY  PRN MC SEE 

COMMENTS;  Start 4/13/20 at 09:45;  Status Cancel


Sodium Chloride 100 meq/Potassium Phosphate 19 mmol/ Magnesium Sulfate 12 

meq/Calcium Gluconate 15 meq/ Multivitamins 10 ml/Chromium/ Copper/Manganese/ 

Seleni/Zn 0.5 ml/ Insulin Human Regular 40 unit/ Potassium Chloride 20 meq/ 

Total Parenteral Nutrition/Amino Acids/Dextrose/ Fat Emulsion Intravenous 1,400 

ml @  58.333 mls/ hr TPN  CONT IV  Last administered on 4/13/20at 22:02;  Start 

4/13/20 at 22:00;  Stop 4/14/20 at 21:59;  Status DC


Furosemide (Lasix) 40 mg 1X  ONCE IVP  Last administered on 4/13/20at 14:39;  

Start 4/13/20 at 14:30;  Stop 4/13/20 at 14:31;  Status DC


Metronidazole 100 ml @  100 mls/hr Q8HRS IV  Last administered on 4/21/20at 

06:04;  Start 4/14/20 at 10:00;  Stop 4/21/20 at 08:10;  Status DC


Sodium Chloride 1,000 ml @  1,000 mls/hr Q1H PRN IV hypotension;  Start 4/14/20 

at 08:00;  Stop 4/14/20 at 13:59;  Status DC


Albumin Human 200 ml @  200 mls/hr 1X PRN  PRN IV Hypotension;  Start 4/14/20 at

08:00;  Stop 4/14/20 at 13:59;  Status DC


Sodium Chloride 1,000 ml @  400 mls/hr Q2H30M PRN IV PATENCY;  Start 4/14/20 at 

08:00;  Stop 4/14/20 at 19:59;  Status DC


Info (PHARMACY MONITORING -- do not chart) 1 each PRN DAILY  PRN MC SEE 

COMMENTS;  Start 4/14/20 at 11:30;  Status UNV


Info (PHARMACY MONITORING -- do not chart) 1 each PRN DAILY  PRN MC SEE COM

MENTS;  Start 4/14/20 at 11:30;  Stop 4/16/20 at 12:13;  Status DC


Sodium Chloride 100 meq/Potassium Phosphate 19 mmol/ Magnesium Sulfate 12 

meq/Calcium Gluconate 15 meq/ Multivitamins 10 ml/Chromium/ Copper/Manganese/ 

Seleni/Zn 0.5 ml/ Insulin Human Regular 40 unit/ Potassium Chloride 20 meq/ 

Total Parenteral Nutrition/Amino Acids/Dextrose/ Fat Emulsion Intravenous 1,400 

ml @  58.333 mls/ hr TPN  CONT IV  Last administered on 4/14/20at 21:52;  Start 

4/14/20 at 22:00;  Stop 4/15/20 at 21:59;  Status DC


Sodium Chloride (Normal Saline Flush) 10 ml QSHIFT  PRN IV AFTER MEDS AND BLOOD 

DRAWS;  Start 4/14/20 at 15:00;  Stop 5/12/20 at 11:27;  Status DC


Sodium Chloride (Normal Saline Flush) 10 ml PRN Q5MIN  PRN IV AFTER MEDS AND 

BLOOD DRAWS;  Start 4/14/20 at 15:00


Sodium Chloride (Normal Saline Flush) 20 ml PRN Q5MIN  PRN IV AFTER MEDS AND 

BLOOD DRAWS;  Start 4/14/20 at 15:00


Sodium Chloride 100 meq/Potassium Phosphate 19 mmol/ Magnesium Sulfate 12 

meq/Calcium Gluconate 15 meq/ Multivitamins 10 ml/Chromium/ Copper/Manganese/ 

Seleni/Zn 0.5 ml/ Insulin Human Regular 40 unit/ Potassium Chloride 20 meq/ 

Total Parenteral Nutrition/Amino Acids/Dextrose/ Fat Emulsion Intravenous 1,400 

ml @  58.333 mls/ hr TPN  CONT IV  Last administered on 4/15/20at 21:20;  Start 

4/15/20 at 22:00;  Stop 4/16/20 at 21:59;  Status DC


Lidocaine HCl (Buffered Lidocaine 1%) 3 ml STK-MED ONCE .ROUTE ;  Start 4/15/20 

at 13:16;  Stop 4/15/20 at 13:16;  Status DC


Lidocaine HCl (Buffered Lidocaine 1%) 6 ml 1X  ONCE INJ  Last administered on 

4/15/20at 13:45;  Start 4/15/20 at 13:30;  Stop 4/15/20 at 13:31;  Status DC


Albumin Human 100 ml @  100 mls/hr 1X  ONCE IV  Last administered on 4/15/20at 

15:41;  Start 4/15/20 at 15:00;  Stop 4/15/20 at 15:59;  Status DC


Albumin Human 50 ml @ 50 mls/hr 1X  ONCE IV  Last administered on 4/15/20at 

15:00;  Start 4/15/20 at 15:00;  Stop 4/15/20 at 15:59;  Status DC


Info (PHARMACY MONITORING -- do not chart) 1 each PRN DAILY  PRN MC SEE 

COMMENTS;  Start 4/16/20 at 11:30;  Status Cancel


Info (PHARMACY MONITORING -- do not chart) 1 each PRN DAILY  PRN MC SEE 

COMMENTS;  Start 4/16/20 at 11:30;  Status UNV


Sodium Chloride 100 meq/Potassium Phosphate 10 mmol/ Magnesium Sulfate 12 

meq/Calcium Gluconate 15 meq/ Multivitamins 10 ml/Chromium/ Copper/Manganese/ 

Seleni/Zn 0.5 ml/ Insulin Human Regular 35 unit/ Potassium Chloride 20 meq/ 

Total Parenteral Nutrition/Amino Acids/Dextrose/ Fat Emulsion Intravenous 1,400 

ml @  58.333 mls/ hr TPN  CONT IV  Last administered on 4/16/20at 22:10;  Start 

4/16/20 at 22:00;  Stop 4/17/20 at 21:59;  Status DC


Sodium Chloride 100 meq/Potassium Phosphate 5 mmol/ Magnesium Sulfate 12 

meq/Calcium Gluconate 15 meq/ Multivitamins 10 ml/Chromium/ Copper/Manganese/ 

Seleni/Zn 0.5 ml/ Insulin Human Regular 35 unit/ Potassium Chloride 20 meq/ 

Total Parenteral Nutrition/Amino Acids/Dextrose/ Fat Emulsion Intravenous 1,400 

ml @  58.333 mls/ hr TPN  CONT IV  Last administered on 4/17/20at 22:59;  Start 

4/17/20 at 22:00;  Stop 4/18/20 at 21:59;  Status DC


Sodium Chloride 1,000 ml @  1,000 mls/hr Q1H PRN IV hypotension;  Start 4/18/20 

at 08:27;  Stop 4/18/20 at 14:26;  Status DC


Albumin Human 200 ml @  200 mls/hr 1X PRN  PRN IV Hypotension Last administered 

on 4/18/20at 09:18;  Start 4/18/20 at 08:30;  Stop 4/18/20 at 14:29;  Status DC


Sodium Chloride 1,000 ml @  400 mls/hr Q2H30M PRN IV PATENCY;  Start 4/18/20 at 

08:27;  Stop 4/18/20 at 20:26;  Status DC


Info (PHARMACY MONITORING -- do not chart) 1 each PRN DAILY  PRN MC SEE 

COMMENTS;  Start 4/18/20 at 08:30;  Status Cancel


Info (PHARMACY MONITORING -- do not chart) 1 each PRN DAILY  PRN MC SEE 

COMMENTS;  Start 4/18/20 at 08:30;  Stop 4/26/20 at 13:10;  Status DC


Sodium Chloride 100 meq/Potassium Chloride 40 meq/ Magnesium Sulfate 15 

meq/Calcium Gluconate 15 meq/ Multivitamins 10 ml/Chromium/ Copper/Manganese/ 

Seleni/Zn 0.5 ml/ Insulin Human Regular 35 unit/ Total Parenteral 

Nutrition/Amino Acids/Dextrose/ Fat Emulsion Intravenous 1,400 ml @  58.333 mls/

hr TPN  CONT IV  Last administered on 4/18/20at 22:00;  Start 4/18/20 at 22:00; 

Stop 4/19/20 at 21:59;  Status DC


Potassium Chloride/Water 100 ml @  100 mls/hr 1X  ONCE IV  Last administered on 

4/18/20at 17:28;  Start 4/18/20 at 14:45;  Stop 4/18/20 at 15:44;  Status DC


Sodium Chloride 100 meq/Potassium Chloride 40 meq/ Magnesium Sulfate 15 

meq/Calcium Gluconate 15 meq/ Multivitamins 10 ml/Chromium/ Copper/Manganese/ 

Seleni/Zn 0.5 ml/ Insulin Human Regular 35 unit/ Total Parenteral Nutrition

/Amino Acids/Dextrose/ Fat Emulsion Intravenous 1,400 ml @  58.333 mls/ hr TPN  

CONT IV  Last administered on 4/19/20at 22:46;  Start 4/19/20 at 22:00;  Stop 

4/20/20 at 21:59;  Status DC


Sodium Chloride 100 meq/Potassium Chloride 40 meq/ Magnesium Sulfate 20 

meq/Calcium Gluconate 15 meq/ Multivitamins 10 ml/Chromium/ Copper/Manganese/ 

Seleni/Zn 0.5 ml/ Insulin Human Regular 35 unit/ Total Parenteral 

Nutrition/Amino Acids/Dextrose/ Fat Emulsion Intravenous 1,400 ml @  58.333 mls/

hr TPN  CONT IV  Last administered on 4/20/20at 22:31;  Start 4/20/20 at 22:00; 

Stop 4/21/20 at 21:59;  Status DC


Fentanyl Citrate (Fentanyl 2ml Vial) 50 mcg PRN Q2HR  PRN IVP PAIN Last 

administered on 4/27/20at 13:32;  Start 4/20/20 at 21:00;  Stop 4/28/20 at 

12:53;  Status DC


Fentanyl Citrate (Fentanyl 2ml Vial) 25 mcg PRN Q2HR  PRN IVP PAIN;  Start 

4/20/20 at 21:00;  Stop 4/28/20 at 12:54;  Status DC


Enoxaparin Sodium (Lovenox 100mg Syringe) 100 mg Q12HR SQ ;  Start 4/21/20 at 

21:00;  Status UNV


Amino Acids/ Glycerin/ Electrolytes 1,000 ml @  75 mls/hr [id number] IV ;  Start 

4/20/20 at 21:15;  Status UNV


Sodium Chloride 1,000 ml @  1,000 mls/hr Q1H PRN IV hypotension;  Start 4/21/20 

at 07:56;  Stop 4/21/20 at 13:55;  Status DC


Albumin Human 200 ml @  200 mls/hr 1X PRN  PRN IV Hypotension Last administered 

on 4/21/20at 08:40;  Start 4/21/20 at 08:00;  Stop 4/21/20 at 13:59;  Status DC


Sodium Chloride 1,000 ml @  400 mls/hr Q2H30M PRN IV PATENCY;  Start 4/21/20 at 

07:56;  Stop 4/21/20 at 19:55;  Status DC


Info (PHARMACY MONITORING -- do not chart) 1 each PRN DAILY  PRN MC SEE 

COMMENTS;  Start 4/21/20 at 08:00;  Status UNV


Info (PHARMACY MONITORING -- do not chart) 1 each PRN DAILY  PRN MC SEE 

COMMENTS;  Start 4/21/20 at 08:00;  Status UNV


Daptomycin 430 mg/ Sodium Chloride 50 ml @  100 mls/hr Q24H IV  Last 

administered on 4/21/20at 12:35;  Start 4/21/20 at 09:00;  Stop 4/21/20 at 

12:49;  Status DC


Sodium Chloride 100 meq/Potassium Chloride 40 meq/ Magnesium Sulfate 20 

meq/Calcium Gluconate 15 meq/ Multivitamins 10 ml/Chromium/ Copper/Manganese/ 

Seleni/Zn 0.5 ml/ Insulin Human Regular 35 unit/ Total Parenteral 

Nutrition/Amino Acids/Dextrose/ Fat Emulsion Intravenous 1,400 ml @  58.333 mls/

hr TPN  CONT IV  Last administered on 4/21/20at 21:26;  Start 4/21/20 at 22:00; 

Stop 4/22/20 at 21:59;  Status DC


Daptomycin 430 mg/ Sodium Chloride 50 ml @  100 mls/hr Q48H IV ;  Start 4/23/20 

at 09:00;  Stop 4/22/20 at 11:55;  Status DC


Sodium Chloride 100 meq/Potassium Chloride 40 meq/ Magnesium Sulfate 20 

meq/Calcium Gluconate 15 meq/ Multivitamins 10 ml/Chromium/ Copper/Manganese/ 

Seleni/Zn 0.5 ml/ Insulin Human Regular 35 unit/ Total Parenteral 

Nutrition/Amino Acids/Dextrose/ Fat Emulsion Intravenous 1,400 ml @  58.333 mls/

hr TPN  CONT IV  Last administered on 4/22/20at 22:27;  Start 4/22/20 at 22:00; 

Stop 4/23/20 at 21:59;  Status DC


Daptomycin 430 mg/ Sodium Chloride 50 ml @  100 mls/hr Q24H IV  Last 

administered on 4/24/20at 15:07;  Start 4/22/20 at 13:00;  Stop 4/25/20 at 

13:15;  Status DC


Sodium Chloride 100 meq/Potassium Chloride 40 meq/ Magnesium Sulfate 20 

meq/Calcium Gluconate 10 meq/ Multivitamins 10 ml/Chromium/ Copper/Manganese/ 

Seleni/Zn 0.5 ml/ Insulin Human Regular 35 unit/ Total Parenteral 

Nutrition/Amino Acids/Dextrose/ Fat Emulsion Intravenous 1,400 ml @  58.333 mls/

hr TPN  CONT IV  Last administered on 4/24/20at 00:06;  Start 4/23/20 at 22:00; 

Stop 4/24/20 at 21:59;  Status DC


Alteplase, Recombinant (Cathflo For Central Catheter Clearance) 1 mg 1X  ONCE 

INT CAT  Last administered on 4/24/20at 11:44;  Start 4/24/20 at 10:45;  Stop 4 /24/20 at 10:46;  Status DC


Ondansetron HCl (Zofran) 4 mg PRN Q6HRS  PRN IV NAUSEA/VOMITING;  Start 4/27/20 

at 07:00;  Stop 4/28/20 at 06:59;  Status DC


Fentanyl Citrate (Fentanyl 2ml Vial) 25 mcg PRN Q5MIN  PRN IV MILD PAIN 1-3;  

Start 4/27/20 at 07:00;  Stop 4/28/20 at 06:59;  Status DC


Fentanyl Citrate (Fentanyl 2ml Vial) 50 mcg PRN Q5MIN  PRN IV MODERATE TO SEVERE

PAIN Last administered on 4/27/20at 10:17;  Start 4/27/20 at 07:00;  Stop 

4/28/20 at 06:59;  Status DC


Ringer's Solution 1,000 ml @  30 mls/hr Q24H IV ;  Start 4/27/20 at 07:00;  Stop

4/27/20 at 18:59;  Status DC


Lidocaine HCl (Xylocaine-Mpf 1% 2ml Vial) 2 ml PRN 1X  PRN ID PRIOR TO IV START;

 Start 4/27/20 at 07:00;  Stop 4/28/20 at 06:59;  Status DC


Prochlorperazine Edisylate (Compazine) 5 mg PACU PRN  PRN IV NAUSEA, MRX1;  

Start 4/27/20 at 07:00;  Stop 4/28/20 at 06:59;  Status DC


Sodium Acetate 50 meq/Potassium Acetate 55 meq/ Magnesium Sulfate 20 meq/Calcium

Gluconate 10 meq/ Multivitamins 10 ml/Chromium/ Copper/Manganese/ Seleni/Zn 0.5 

ml/ Insulin Human Regular 35 unit/ Total Parenteral Nutrition/Amino 

Acids/Dextrose/ Fat Emulsion Intravenous 1,400 ml @  58.333 mls/ hr TPN  CONT IV

;  Start 4/24/20 at 22:00;  Stop 4/24/20 at 14:15;  Status DC


Sodium Acetate 50 meq/Potassium Acetate 55 meq/ Magnesium Sulfate 20 meq/Calcium

Gluconate 10 meq/ Multivitamins 10 ml/Chromium/ Copper/Manganese/ Seleni/Zn 0.5 

ml/ Insulin Human Regular 35 unit/ Total Parenteral Nutrition/Amino 

Acids/Dextrose/ Fat Emulsion Intravenous 1,800 ml @  75 mls/hr TPN  CONT IV  

Last administered on 4/24/20at 22:38;  Start 4/24/20 at 22:00;  Stop 4/25/20 at 

21:59;  Status DC


Sodium Chloride 1,000 ml @  1,000 mls/hr Q1H PRN IV hypotension;  Start 4/24/20 

at 15:31;  Stop 4/24/20 at 21:30;  Status DC


Diphenhydramine HCl (Benadryl) 25 mg 1X PRN  PRN IV ITCHING;  Start 4/24/20 at 

15:45;  Stop 4/25/20 at 15:44;  Status DC


Diphenhydramine HCl (Benadryl) 25 mg 1X PRN  PRN IV ITCHING;  Start 4/24/20 at 

15:45;  Stop 4/25/20 at 15:44;  Status DC


Sodium Chloride 1,000 ml @  400 mls/hr Q2H30M PRN IV PATENCY;  Start 4/24/20 at 

15:31;  Stop 4/25/20 at 03:30;  Status DC


Info (PHARMACY MONITORING -- do not chart) 1 each PRN DAILY  PRN MC SEE 

COMMENTS;  Start 4/24/20 at 15:45;  Stop 5/26/20 at 14:14;  Status DC


Sodium Acetate 50 meq/Potassium Acetate 55 meq/ Magnesium Sulfate 20 meq/Calcium

Gluconate 10 meq/ Multivitamins 10 ml/Chromium/ Copper/Manganese/ Seleni/Zn 0.5 

ml/ Insulin Human Regular 35 unit/ Total Parenteral Nutrition/Amino 

Acids/Dextrose/ Fat Emulsion Intravenous 1,800 ml @  75 mls/hr TPN  CONT IV  

Last administered on 4/25/20at 22:03;  Start 4/25/20 at 22:00;  Stop 4/26/20 at 

21:59;  Status DC


Daptomycin 430 mg/ Sodium Chloride 50 ml @  100 mls/hr Q24H IV  Last 

administered on 4/30/20at 13:00;  Start 4/25/20 at 13:00;  Stop 4/30/20 at 2

0:58;  Status DC


Heparin Sodium (Porcine) 1000 unit/Sodium Chloride 1,001 ml @  1,001 mls/hr 1X  

ONCE IRR ;  Start 4/27/20 at 06:00;  Stop 4/27/20 at 06:59;  Status DC


Potassium Acetate 55 meq/Magnesium Sulfate 20 meq/ Calcium Gluconate 10 meq/ 

Multivitamins 10 ml/Chromium/ Copper/Manganese/ Seleni/Zn 0.5 ml/ Insulin Human 

Regular 35 unit/ Total Parenteral Nutrition/Amino Acids/Dextrose/ Fat Emulsion 

Intravenous 1,920 ml @  80 mls/hr TPN  CONT IV  Last administered on 4/26/20at 

22:10;  Start 4/26/20 at 22:00;  Stop 4/27/20 at 21:59;  Status DC


Dexamethasone Sodium Phosphate (Decadron) 4 mg STK-MED ONCE .ROUTE ;  Start 

4/27/20 at 10:56;  Stop 4/27/20 at 10:57;  Status DC


Ondansetron HCl (Zofran) 4 mg STK-MED ONCE .ROUTE ;  Start 4/27/20 at 10:56;  

Stop 4/27/20 at 10:57;  Status DC


Rocuronium Bromide (Zemuron) 50 mg STK-MED ONCE .ROUTE ;  Start 4/27/20 at 

10:56;  Stop 4/27/20 at 10:57;  Status DC


Fentanyl Citrate (Fentanyl 2ml Vial) 100 mcg STK-MED ONCE .ROUTE ;  Start 

4/27/20 at 10:56;  Stop 4/27/20 at 10:57;  Status DC


Bupivacaine HCl/ Epinephrine Bitart (Sensorcain-Epi 0.5%-1:081646 Mpf) 30 ml 

STK-MED ONCE .ROUTE  Last administered on 4/27/20at 12:01;  Start 4/27/20 at 

10:58;  Stop 4/27/20 at 10:58;  Status DC


Cellulose (Surgicel Hemostat 2x14) 1 each STK-MED ONCE .ROUTE ;  Start 4/27/20 

at 10:58;  Stop 4/27/20 at 10:59;  Status DC


Iohexol (Omnipaque 300 Mg/ml) 50 ml STK-MED ONCE .ROUTE ;  Start 4/27/20 at 

10:58;  Stop 4/27/20 at 10:59;  Status DC


Cellulose (Surgicel Hemostat 4x8) 1 each STK-MED ONCE .ROUTE ;  Start 4/27/20 at

10:58;  Stop 4/27/20 at 10:59;  Status DC


Bisacodyl (Dulcolax Supp) 10 mg STK-MED ONCE .ROUTE ;  Start 4/27/20 at 10:59;  

Stop 4/27/20 at 10:59;  Status DC


Heparin Sodium (Porcine) 1000 unit/Sodium Chloride 1,001 ml @  1,001 mls/hr 1X  

ONCE IRR ;  Start 4/27/20 at 12:00;  Stop 4/27/20 at 12:59;  Status DC


Propofol 20 ml @ As Directed STK-MED ONCE IV ;  Start 4/27/20 at 11:05;  Stop 

4/27/20 at 11:05;  Status DC


Sevoflurane (Ultane) 90 ml STK-MED ONCE IH ;  Start 4/27/20 at 11:05;  Stop 4/27 /20 at 11:05;  Status DC


Sevoflurane (Ultane) 60 ml STK-MED ONCE IH ;  Start 4/27/20 at 12:26;  Stop 

4/27/20 at 12:27;  Status DC


Propofol 20 ml @ As Directed STK-MED ONCE IV ;  Start 4/27/20 at 12:26;  Stop 

4/27/20 at 12:27;  Status DC


Phenylephrine HCl (PHENYLEPHRINE in 0.9% NACL PF) 1 mg STK-MED ONCE IV ;  Start 

4/27/20 at 12:34;  Stop 4/27/20 at 12:34;  Status DC


Heparin Sodium (Porcine) (Heparin Sodium) 5,000 unit Q12HR SQ  Last administered

on 5/6/20at 20:57;  Start 4/27/20 at 21:00;  Stop 5/7/20 at 09:59;  Status DC


Sodium Chloride (Normal Saline Flush) 3 ml QSHIFT  PRN IV AFTER MEDS AND BLOOD 

DRAWS;  Start 4/27/20 at 13:45;  Status Cancel


Naloxone HCl (Narcan) 0.4 mg PRN Q2MIN  PRN IV SEE INSTRUCTIONS Last 

administered on 6/6/20at 15:15;  Start 4/27/20 at 13:45;  Stop 7/1/20 at 16:00; 

Status DC


Sodium Chloride 1,000 ml @  25 mls/hr Q24H IV  Last administered on 5/26/20at 

13:37;  Start 4/27/20 at 13:37;  Stop 5/29/20 at 13:09;  Status DC


Naloxone HCl (Narcan) 0.4 mg PRN Q2MIN  PRN IV SEE INSTRUCTIONS;  Start 4/27/20 

at 14:30;  Status UNV


Sodium Chloride 1,000 ml @  25 mls/hr Q24H IV ;  Start 4/27/20 at 14:30;  Status

UNV


Hydromorphone HCl 30 ml @ 0 mls/hr CONT PRN  PRN IV PER PROTOCOL Last 

administered on 5/2/20at 16:08;  Start 4/27/20 at 14:30;  Stop 5/4/20 at 08:55; 

Status DC


Potassium Acetate 55 meq/Magnesium Sulfate 20 meq/ Calcium Gluconate 10 meq/ 

Multivitamins 10 ml/Chromium/ Copper/Manganese/ Seleni/Zn 0.5 ml/ Insulin Human 

Regular 35 unit/ Total Parenteral Nutrition/Amino Acids/Dextrose/ Fat Emulsion 

Intravenous 1,920 ml @  80 mls/hr TPN  CONT IV  Last administered on 4/27/20at 

22:01;  Start 4/27/20 at 22:00;  Stop 4/28/20 at 21:59;  Status DC


Bumetanide (Bumex) 2 mg BID92 IV  Last administered on 5/1/20at 13:50;  Start 

4/28/20 at 14:00;  Stop 5/2/20 at 14:10;  Status DC


Meropenem 1 gm/ Sodium Chloride 100 ml @  200 mls/hr Q8HRS IV  Last administered

on 5/22/20at 05:53;  Start 4/28/20 at 14:00;  Stop 5/22/20 at 09:31;  Status DC


Potassium Acetate 55 meq/Magnesium Sulfate 20 meq/ Calcium Gluconate 10 meq/ 

Multivitamins 10 ml/Chromium/ Copper/Manganese/ Seleni/Zn 0.5 ml/ Insulin Human 

Regular 35 unit/ Total Parenteral Nutrition/Amino Acids/Dextrose/ Fat Emulsion 

Intravenous 1,920 ml @  80 mls/hr TPN  CONT IV  Last administered on 4/28/20at 

22:02;  Start 4/28/20 at 22:00;  Stop 4/29/20 at 21:59;  Status DC


Hydromorphone HCl (Dilaudid Standard PCA) 12 mg STK-MED ONCE IV ;  Start 4/27/20

at 14:35;  Stop 4/28/20 at 13:53;  Status DC


Artificial Tears (Artificial Tears) 1 drop PRN Q15MIN  PRN OU DRY EYE Last 

administered on 6/23/20at 21:17;  Start 4/29/20 at 05:30


Hydromorphone HCl (Dilaudid Standard PCA) 12 mg STK-MED ONCE IV ;  Start 4/28/20

at 12:05;  Stop 4/29/20 at 09:15;  Status DC


Potassium Acetate 65 meq/Magnesium Sulfate 20 meq/ Calcium Gluconate 10 meq/ 

Multivitamins 10 ml/Chromium/ Copper/Manganese/ Seleni/Zn 0.5 ml/ Insulin Human 

Regular 30 unit/ Total Parenteral Nutrition/Amino Acids/Dextrose/ Fat Emulsion 

Intravenous 1,920 ml @  80 mls/hr TPN  CONT IV  Last administered on 4/29/20at 

22:22;  Start 4/29/20 at 22:00;  Stop 4/30/20 at 21:59;  Status DC


Cyclobenzaprine HCl (Flexeril) 10 mg PRN Q6HRS  PRN PO MUSCLE SPASMS Last 

administered on 7/10/20at 19:12;  Start 4/30/20 at 10:45


Potassium Acetate 55 meq/Magnesium Sulfate 20 meq/ Calcium Gluconate 10 meq/ 

Multivitamins 10 ml/Chromium/ Copper/Manganese/ Seleni/Zn 0.5 ml/ Insulin Human 

Regular 30 unit/ Total Parenteral Nutrition/Amino Acids/Dextrose/ Fat Emulsion 

Intravenous 1,920 ml @  80 mls/hr TPN  CONT IV  Last administered on 5/1/20at 

01:00;  Start 4/30/20 at 22:00;  Stop 5/1/20 at 21:59;  Status DC


Magnesium Sulfate 50 ml @ 25 mls/hr 1X  ONCE IV  Last administered on 4/30/20at 

17:18;  Start 4/30/20 at 12:45;  Stop 4/30/20 at 14:44;  Status DC


Potassium Chloride/Water 100 ml @  100 mls/hr 1X  ONCE IV  Last administered on 

5/1/20at 11:27;  Start 5/1/20 at 12:00;  Stop 5/1/20 at 12:59;  Status DC


Hydromorphone HCl (Dilaudid Standard PCA) 12 mg STK-MED ONCE IV ;  Start 4/29/20

at 10:50;  Stop 5/1/20 at 11:02;  Status DC


Hydromorphone HCl (Dilaudid Standard PCA) 12 mg STK-MED ONCE IV ;  Start 4/30/20

at 13:47;  Stop 5/1/20 at 11:03;  Status DC


Potassium Acetate 30 meq/Magnesium Sulfate 20 meq/ Calcium Gluconate 10 meq/ 

Multivitamins 10 ml/Chromium/ Copper/Manganese/ Seleni/Zn 0.5 ml/ Insulin Human 

Regular 30 unit/ Potassium Chloride 30 meq/ Total Parenteral Nutrition/Amino 

Acids/Dextrose/ Fat Emulsion Intravenous 1,920 ml @  80 mls/hr TPN  CONT IV  

Last administered on 5/1/20at 22:34;  Start 5/1/20 at 22:00;  Stop 5/2/20 at 

21:59;  Status DC


Potassium Chloride/Water 100 ml @  100 mls/hr Q1H IV  Last administered on 

5/2/20at 13:05;  Start 5/2/20 at 07:00;  Stop 5/2/20 at 10:59;  Status DC


Magnesium Sulfate 50 ml @ 25 mls/hr 1X  ONCE IV  Last administered on 5/2/20at 

10:34;  Start 5/2/20 at 10:30;  Stop 5/2/20 at 12:29;  Status DC


Potassium Chloride 75 meq/ Magnesium Sulfate 20 meq/Calcium Gluconate 10 meq/ 

Multivitamins 10 ml/Chromium/ Copper/Manganese/ Seleni/Zn 0.5 ml/ Insulin Human 

Regular 30 unit/ Total Parenteral Nutrition/Amino Acids/Dextrose/ Fat Emulsion 

Intravenous 1,920 ml @  80 mls/hr TPN  CONT IV  Last administered on 5/2/20at 

21:51;  Start 5/2/20 at 22:00;  Stop 5/3/20 at 22:00;  Status DC


Potassium Chloride 75 meq/ Magnesium Sulfate 20 meq/Calcium Gluconate 10 meq/ 

Multivitamins 10 ml/Chromium/ Copper/Manganese/ Seleni/Zn 0.5 ml/ Insulin Human 

Regular 25 unit/ Total Parenteral Nutrition/Amino Acids/Dextrose/ Fat Emulsion 

Intravenous 1,920 ml @  80 mls/hr TPN  CONT IV  Last administered on 5/3/20at 

22:04;  Start 5/3/20 at 22:00;  Stop 5/4/20 at 21:59;  Status DC


Hydromorphone HCl (Dilaudid) 0.4 mg PRN Q4HRS  PRN IVP PAIN Last administered on

5/4/20at 10:57;  Start 5/4/20 at 09:00;  Stop 5/4/20 at 18:59;  Status DC


Micafungin Sodium 100 mg/Dextrose 100 ml @  100 mls/hr Q24H IV  Last 

administered on 5/26/20at 12:17;  Start 5/4/20 at 11:00;  Stop 5/27/20 at 09:59;

 Status DC


Daptomycin 485 mg/ Sodium Chloride 50 ml @  100 mls/hr Q24H IV  Last 

administered on 5/11/20at 13:10;  Start 5/4/20 at 11:00;  Stop 5/12/20 at 07:44;

 Status DC


Potassium Chloride 75 meq/ Magnesium Sulfate 15 meq/Calcium Gluconate 8 meq/ 

Multivitamins 10 ml/Chromium/ Copper/Manganese/ Seleni/Zn 0.5 ml/ Insulin Human 

Regular 25 unit/ Total Parenteral Nutrition/Amino Acids/Dextrose/ Fat Emulsion 

Intravenous 1,920 ml @  80 mls/hr TPN  CONT IV  Last administered on 5/4/20at 

23:08;  Start 5/4/20 at 22:00;  Stop 5/5/20 at 21:59;  Status DC


Haloperidol Lactate (Haldol Inj) 3 mg 1X  ONCE IVP  Last administered on 

5/4/20at 14:37;  Start 5/4/20 at 14:30;  Stop 5/4/20 at 14:31;  Status DC


Hydromorphone HCl (Dilaudid) 1 mg PRN Q4HRS  PRN IVP PAIN Last administered on 

5/18/20at 06:25;  Start 5/4/20 at 19:00;  Stop 5/18/20 at 17:10;  Status DC


Potassium Chloride 75 meq/ Magnesium Sulfate 15 meq/Calcium Gluconate 8 meq/ 

Multivitamins 10 ml/Chromium/ Copper/Manganese/ Seleni/Zn 0.5 ml/ Insulin Human 

Regular 20 unit/ Total Parenteral Nutrition/Amino Acids/Dextrose/ Fat Emulsion 

Intravenous 1,920 ml @  80 mls/hr TPN  CONT IV  Last administered on 5/5/20at 

22:10;  Start 5/5/20 at 22:00;  Stop 5/6/20 at 21:59;  Status DC


Lidocaine HCl (Buffered Lidocaine 1%) 3 ml STK-MED ONCE .ROUTE ;  Start 5/6/20 

at 11:31;  Stop 5/6/20 at 11:31;  Status DC


Lidocaine HCl (Buffered Lidocaine 1%) 3 ml STK-MED ONCE .ROUTE ;  Start 5/6/20 

at 12:28;  Stop 5/6/20 at 12:29;  Status DC


Lidocaine HCl (Buffered Lidocaine 1%) 6 ml 1X  ONCE INJ  Last administered on 

5/6/20at 12:53;  Start 5/6/20 at 12:45;  Stop 5/6/20 at 12:46;  Status DC


Potassium Chloride 75 meq/ Magnesium Sulfate 15 meq/Calcium Gluconate 8 meq/ 

Multivitamins 10 ml/Chromium/ Copper/Manganese/ Seleni/Zn 0.5 ml/ Insulin Human 

Regular 20 unit/ Total Parenteral Nutrition/Amino Acids/Dextrose/ Fat Emulsion 

Intravenous 1,920 ml @  80 mls/hr TPN  CONT IV  Last administered on 5/6/20at 

22:00;  Start 5/6/20 at 22:00;  Stop 5/7/20 at 21:59;  Status DC


Potassium Chloride 75 meq/ Magnesium Sulfate 15 meq/Calcium Gluconate 8 meq/ 

Multivitamins 10 ml/Chromium/ Copper/Manganese/ Seleni/Zn 0.5 ml/ Insulin Human 

Regular 15 unit/ Total Parenteral Nutrition/Amino Acids/Dextrose/ Fat Emulsion 

Intravenous 1,920 ml @  80 mls/hr TPN  CONT IV  Last administered on 5/7/20at 

22:28;  Start 5/7/20 at 22:00;  Stop 5/8/20 at 21:59;  Status DC


Vecuronium Bromide (Norcuron Bolus) 6 mg PRN Q6HRS  PRN IV VENT ASYNCHRONY;  

Start 5/7/20 at 19:15;  Stop 5/7/20 at 19:35;  Status DC


Bumetanide (Bumex) 2 mg 1X  ONCE IV  Last administered on 5/7/20at 22:09;  Start

5/7/20 at 19:45;  Stop 5/7/20 at 19:46;  Status DC


Lidocaine HCl (Buffered Lidocaine 1%) 3 ml STK-MED ONCE .ROUTE ;  Start 5/8/20 

at 07:59;  Stop 5/8/20 at 07:59;  Status DC


Midazolam HCl (Versed) 5 mg STK-MED ONCE .ROUTE ;  Start 5/8/20 at 08:36;  Stop 

5/8/20 at 08:36;  Status DC


Fentanyl Citrate (Fentanyl 5ml Vial) 250 mcg STK-MED ONCE .ROUTE ;  Start 5/8/20

at 08:36;  Stop 5/8/20 at 08:37;  Status DC


Lidocaine HCl (Buffered Lidocaine 1%) 3 ml 1X  ONCE IJ  Last administered on 

5/8/20at 09:30;  Start 5/8/20 at 09:15;  Stop 5/8/20 at 09:16;  Status DC


Midazolam HCl (Versed) 5 mg 1X  ONCE IV  Last administered on 5/8/20at 09:30;  

Start 5/8/20 at 09:15;  Stop 5/8/20 at 09:16;  Status DC


Fentanyl Citrate (Fentanyl 5ml Vial) 250 mcg 1X  ONCE IV  Last administered on 

5/8/20at 09:30;  Start 5/8/20 at 09:15;  Stop 5/8/20 at 09:16;  Status DC


Bumetanide (Bumex) 2 mg DAILY IV  Last administered on 5/18/20at 08:07;  Start 

5/8/20 at 10:00;  Stop 5/18/20 at 17:15;  Status DC


Potassium Chloride 75 meq/ Magnesium Sulfate 15 meq/ Multivitamins 10 

ml/Chromium/ Copper/Manganese/ Seleni/Zn 0.5 ml/ Insulin Human Regular 15 unit/ 

Total Parenteral Nutrition/Amino Acids/Dextrose/ Fat Emulsion Intravenous 1,920 

ml @  80 mls/hr TPN  CONT IV  Last administered on 5/8/20at 21:59;  Start 5/8/20

at 22:00;  Stop 5/9/20 at 21:59;  Status DC


Metoclopramide HCl (Reglan Vial) 10 mg PRN Q3HRS  PRN IVP NAUSEA/VOMITING-3rd 

choice Last administered on 5/14/20at 04:25;  Start 5/9/20 at 16:45


Potassium Chloride 75 meq/ Magnesium Sulfate 15 meq/ Multivitamins 10 ml/Ch

romium/ Copper/Manganese/ Seleni/Zn 0.5 ml/ Insulin Human Regular 15 unit/ Total

Parenteral Nutrition/Amino Acids/Dextrose/ Fat Emulsion Intravenous 1,920 ml @  

80 mls/hr TPN  CONT IV  Last administered on 5/9/20at 22:41;  Start 5/9/20 at 

22:00;  Stop 5/10/20 at 21:59;  Status DC


Magnesium Sulfate 50 ml @ 25 mls/hr 1X  ONCE IV  Last administered on 5/10/20at 

10:44;  Start 5/10/20 at 09:00;  Stop 5/10/20 at 10:59;  Status DC


Potassium Chloride/Water 100 ml @  100 mls/hr 1X  ONCE IV  Last administered on 

5/10/20at 09:37;  Start 5/10/20 at 09:00;  Stop 5/10/20 at 09:59;  Status DC


Duloxetine HCl (Cymbalta) 30 mg DAILY PO  Last administered on 5/11/20at 09:48; 

Start 5/10/20 at 14:00;  Stop 5/13/20 at 10:25;  Status DC


Potassium Chloride 80 meq/ Magnesium Sulfate 20 meq/ Multivitamins 10 

ml/Chromium/ Copper/Manganese/ Seleni/Zn 0.5 ml/ Insulin Human Regular 15 unit/ 

Total Parenteral Nutrition/Amino Acids/Dextrose/ Fat Emulsion Intravenous 1,920 

ml @  80 mls/hr TPN  CONT IV  Last administered on 5/10/20at 21:42;  Start 

5/10/20 at 22:00;  Stop 5/11/20 at 21:59;  Status DC


Potassium Chloride 80 meq/ Magnesium Sulfate 20 meq/ Multivitamins 10 

ml/Chromium/ Copper/Manganese/ Seleni/Zn 0.5 ml/ Insulin Human Regular 15 unit/ 

Total Parenteral Nutrition/Amino Acids/Dextrose/ Fat Emulsion Intravenous 1,920 

ml @  80 mls/hr TPN  CONT IV  Last administered on 5/11/20at 22:20;  Start 

5/11/20 at 22:00;  Stop 5/12/20 at 21:59;  Status DC


Lidocaine HCl (Buffered Lidocaine 1%) 3 ml STK-MED ONCE .ROUTE ;  Start 5/12/20 

at 09:54;  Stop 5/12/20 at 09:55;  Status DC


Hydromorphone HCl (Dilaudid Standard PCA) 12 mg STK-MED ONCE IV ;  Start 5/1/20 

at 15:50;  Stop 5/12/20 at 11:24;  Status DC


Potassium Chloride 80 meq/ Magnesium Sulfate 20 meq/ Multivitamins 10 

ml/Chromium/ Copper/Manganese/ Seleni/Zn 0.5 ml/ Insulin Human Regular 15 unit/ 

Total Parenteral Nutrition/Amino Acids/Dextrose/ Fat Emulsion Intravenous 1,920 

ml @  80 mls/hr TPN  CONT IV  Last administered on 5/12/20at 21:40;  Start 

5/12/20 at 22:00;  Stop 5/13/20 at 21:59;  Status DC


Lidocaine HCl (Buffered Lidocaine 1%) 6 ml 1X  ONCE INJ  Last administered on 

5/12/20at 14:15;  Start 5/12/20 at 14:15;  Stop 5/12/20 at 14:16;  Status DC


Potassium Chloride 80 meq/ Magnesium Sulfate 20 meq/ Multivitamins 10 

ml/Chromium/ Copper/Manganese/ Seleni/Zn 1 ml/ Insulin Human Regular 15 unit/ 

Total Parenteral Nutrition/Amino Acids/Dextrose/ Fat Emulsion Intravenous 1,920 

ml @  80 mls/hr TPN  CONT IV  Last administered on 5/13/20at 22:04;  Start 

5/13/20 at 22:00;  Stop 5/14/20 at 21:59;  Status DC


Potassium Chloride/Water 100 ml @  100 mls/hr 1X  ONCE IV  Last administered on 

5/14/20at 11:34;  Start 5/14/20 at 11:00;  Stop 5/14/20 at 11:59;  Status DC


Potassium Chloride 90 meq/ Magnesium Sulfate 20 meq/ Multivitamins 10 

ml/Chromium/ Copper/Manganese/ Seleni/Zn 1 ml/ Insulin Human Regular 15 unit/ 

Total Parenteral Nutrition/Amino Acids/Dextrose/ Fat Emulsion Intravenous 1,920 

ml @  80 mls/hr TPN  CONT IV  Last administered on 5/14/20at 22:57;  Start 

5/14/20 at 22:00;  Stop 5/15/20 at 21:59;  Status DC


Potassium Chloride 90 meq/ Magnesium Sulfate 20 meq/ Multivitamins 10 

ml/Chromium/ Copper/Manganese/ Seleni/Zn 1 ml/ Insulin Human Regular 15 unit/ 

Total Parenteral Nutrition/Amino Acids/Dextrose/ Fat Emulsion Intravenous 1,920 

ml @  80 mls/hr TPN  CONT IV  Last administered on 5/15/20at 22:48;  Start 

5/15/20 at 22:00;  Stop 5/16/20 at 21:59;  Status DC


Potassium Chloride 90 meq/ Magnesium Sulfate 20 meq/ Multivitamins 10 

ml/Chromium/ Copper/Manganese/ Seleni/Zn 1 ml/ Insulin Human Regular 15 unit/ 

Total Parenteral Nutrition/Amino Acids/Dextrose/ Fat Emulsion Intravenous 1,890 

ml @  78.75 mls/ hr TPN  CONT IV  Last administered on 5/16/20at 22:15;  Start 

5/16/20 at 22:00;  Stop 5/17/20 at 21:59;  Status DC


Linezolid/Dextrose 300 ml @  300 mls/hr Q12HR IV  Last administered on 5/19/20at

21:08;  Start 5/17/20 at 09:00;  Stop 5/20/20 at 08:11;  Status DC


Daptomycin 450 mg/ Sodium Chloride 50 ml @  100 mls/hr Q24H IV  Last 

administered on 5/20/20at 09:25;  Start 5/17/20 at 09:00;  Stop 5/21/20 at 

08:30;  Status DC


Potassium Chloride 90 meq/ Magnesium Sulfate 20 meq/ Multivitamins 10 

ml/Chromium/ Copper/Manganese/ Seleni/Zn 1 ml/ Insulin Human Regular 15 unit/ 

Total Parenteral Nutrition/Amino Acids/Dextrose/ Fat Emulsion Intravenous 1,890 

ml @  78.75 mls/ hr TPN  CONT IV  Last administered on 5/17/20at 21:34;  Start 

5/17/20 at 22:00;  Stop 5/18/20 at 21:59;  Status DC


Lorazepam (Ativan Inj) 2 mg STK-MED ONCE .ROUTE ;  Start 5/17/20 at 14:58;  Stop

5/17/20 at 14:58;  Status DC


Metoprolol Tartrate (Lopressor Vial) 5 mg 1X  ONCE IVP  Last administered on 

5/17/20at 15:31;  Start 5/17/20 at 15:15;  Stop 5/17/20 at 15:16;  Status DC


Lorazepam (Ativan Inj) 2 mg 1X  ONCE IVP  Last administered on 5/17/20at 15:30; 

Start 5/17/20 at 15:15;  Stop 5/17/20 at 15:16;  Status DC


Enoxaparin Sodium (Lovenox 40mg Syringe) 40 mg Q24H SQ  Last administered on 

6/5/20at 17:44;  Start 5/17/20 at 17:00;  Stop 6/7/20 at 06:50;  Status DC


Lorazepam (Ativan Inj) 1 mg PRN Q4HRS  PRN IVP ANXIETY / AGITATION MILD-MOD Last

administered on 5/31/20at 15:55;  Start 5/17/20 at 19:15;  Stop 6/2/20 at 11:45;

 Status DC


Lorazepam (Ativan Inj) 2 mg PRN Q4HRS  PRN IVP ANXIETY / AGITATION SEVERE Last 

administered on 6/1/20at 07:55;  Start 5/17/20 at 19:15;  Stop 6/2/20 at 11:45; 

Status DC


Fentanyl Citrate (Fentanyl 2ml Vial) 50 mcg PRN Q4HRS  PRN IVP SEVERE PAIN Last 

administered on 6/13/20at 05:15;  Start 5/18/20 at 13:15;  Stop 6/14/20 at 

09:29;  Status DC


Fentanyl Citrate (Fentanyl 2ml Vial) 25 mcg PRN Q4HRS  PRN IVP MODERATE PAIN 

Last administered on 6/13/20at 00:27;  Start 5/18/20 at 13:15;  Stop 6/14/20 at 

09:30;  Status DC


Potassium Chloride 90 meq/ Magnesium Sulfate 20 meq/ Multivitamins 10 

ml/Chromium/ Copper/Manganese/ Seleni/Zn 1 ml/ Insulin Human Regular 15 unit/ 

Total Parenteral Nutrition/Amino Acids/Dextrose/ Fat Emulsion Intravenous 1,890 

ml @  78.75 mls/ hr TPN  CONT IV  Last administered on 5/18/20at 22:18;  Start 

5/18/20 at 22:00;  Stop 5/19/20 at 21:59;  Status DC


Furosemide (Lasix) 40 mg 1X  ONCE IVP  Last administered on 5/18/20at 21:51;  

Start 5/18/20 at 21:45;  Stop 5/18/20 at 21:48;  Status DC


Albumin Human 100 ml @  100 mls/hr 1X PRN  PRN IV SEE COMMENTS;  Start 5/19/20 

at 01:30


Furosemide (Lasix) 40 mg [id number] IVP  Last administered on 6/3/20at 08:04;  Start 

5/19/20 at 14:00;  Stop 6/3/20 at 13:07;  Status DC


Potassium Chloride 90 meq/ Magnesium Sulfate 20 meq/ Multivitamins 10 

ml/Chromium/ Copper/Manganese/ Seleni/Zn 1 ml/ Insulin Human Regular 15 unit/ 

Total Parenteral Nutrition/Amino Acids/Dextrose/ Fat Emulsion Intravenous 1,800 

ml @  75 mls/hr TPN  CONT IV  Last administered on 5/19/20at 22:31;  Start 

5/19/20 at 22:00;  Stop 5/20/20 at 21:59;  Status DC


Potassium Chloride 90 meq/ Magnesium Sulfate 20 meq/ Multivitamins 10 

ml/Chromium/ Copper/Manganese/ Seleni/Zn 1 ml/ Insulin Human Regular 15 unit/ 

Total Parenteral Nutrition/Amino Acids/Dextrose/ Fat Emulsion Intravenous 1,800 

ml @  75 mls/hr TPN  CONT IV  Last administered on 5/20/20at 22:28;  Start 

5/20/20 at 22:00;  Stop 5/21/20 at 21:59;  Status DC


Potassium Chloride 110 meq/ Magnesium Sulfate 20 meq/ Multivitamins 10 

ml/Chromium/ Copper/Manganese/ Seleni/Zn 1 ml/ Insulin Human Regular 15 unit/ 

Total Parenteral Nutrition/Amino Acids/Dextrose/ Fat Emulsion Intravenous 1,800 

ml @  75 mls/hr TPN  CONT IV  Last administered on 5/21/20at 22:01;  Start 

5/21/20 at 22:00;  Stop 5/22/20 at 21:59;  Status DC


Saliva Substitute (Biotene Moisturizing Mouth) 2 spray PRN Q15MIN  PRN PO DRY 

MOUTH;  Start 5/21/20 at 11:00


Potassium Chloride 110 meq/ Magnesium Sulfate 20 meq/ Multivitamins 10 ml/Chrom

ium/ Copper/Manganese/ Seleni/Zn 1 ml/ Insulin Human Regular 15 unit/ Total 

Parenteral Nutrition/Amino Acids/Dextrose/ Fat Emulsion Intravenous 1,800 ml @  

75 mls/hr TPN  CONT IV  Last administered on 5/22/20at 22:21;  Start 5/22/20 at 

22:00;  Stop 5/23/20 at 21:59;  Status DC


Potassium Chloride 110 meq/ Magnesium Sulfate 20 meq/ Multivitamins 10 

ml/Chromium/ Copper/Manganese/ Seleni/Zn 1 ml/ Insulin Human Regular 15 unit/ 

Total Parenteral Nutrition/Amino Acids/Dextrose/ Fat Emulsion Intravenous 1,800 

ml @  75 mls/hr TPN  CONT IV  Last administered on 5/23/20at 22:04;  Start 

5/23/20 at 22:00;  Stop 5/24/20 at 21:59;  Status DC


Potassium Chloride 110 meq/ Magnesium Sulfate 20 meq/ Multivitamins 10 

ml/Chromium/ Copper/Manganese/ Seleni/Zn 1 ml/ Insulin Human Regular 15 unit/ 

Total Parenteral Nutrition/Amino Acids/Dextrose/ Fat Emulsion Intravenous 1,800 

ml @  75 mls/hr TPN  CONT IV  Last administered on 5/24/20at 22:48;  Start 

5/24/20 at 22:00;  Stop 5/25/20 at 21:59;  Status DC


Potassium Chloride 70 meq/ Magnesium Sulfate 20 meq/ Multivitamins 10 

ml/Chromium/ Copper/Manganese/ Seleni/Zn 1 ml/ Insulin Human Regular 15 unit/ 

Total Parenteral Nutrition/Amino Acids/Dextrose/ Fat Emulsion Intravenous 1,800 

ml @  75 mls/hr TPN  CONT IV  Last administered on 5/25/20at 21:39;  Start 

5/25/20 at 22:00;  Stop 5/26/20 at 21:59;  Status DC


Meropenem 500 mg/ Sodium Chloride 50 ml @  100 mls/hr Q6HRS IV  Last 

administered on 5/27/20at 06:02;  Start 5/25/20 at 18:00;  Stop 5/27/20 at 

09:59;  Status DC


Barium Sulfate (Varibar Thin Liquid Apple) 148 gm 1X  ONCE PO ;  Start 5/26/20 

at 11:45;  Stop 5/26/20 at 11:49;  Status DC


Potassium Chloride 70 meq/ Magnesium Sulfate 20 meq/ Multivitamins 10 

ml/Chromium/ Copper/Manganese/ Seleni/Zn 1 ml/ Insulin Human Regular 15 unit/ 

Total Parenteral Nutrition/Amino Acids/Dextrose/ Fat Emulsion Intravenous 1,800 

ml @  75 mls/hr TPN  CONT IV  Last administered on 5/26/20at 22:27;  Start 

5/26/20 at 22:00;  Stop 5/27/20 at 21:59;  Status DC


Piperacillin Sod/ Tazobactam Sod 3.375 gm/Sodium Chloride 50 ml @  100 mls/hr 

Q6HRS IV  Last administered on 6/4/20at 06:10;  Start 5/27/20 at 12:00;  Stop 

6/4/20 at 07:26;  Status DC


Potassium Chloride 70 meq/ Magnesium Sulfate 20 meq/ Multivitamins 10 

ml/Chromium/ Copper/Manganese/ Seleni/Zn 1 ml/ Insulin Human Regular 15 unit/ 

Total Parenteral Nutrition/Amino Acids/Dextrose/ Fat Emulsion Intravenous 1,800 

ml @  75 mls/hr TPN  CONT IV  Last administered on 5/27/20at 22:03;  Start 

5/27/20 at 22:00;  Stop 5/28/20 at 21:59;  Status DC


Potassium Chloride 70 meq/ Magnesium Sulfate 20 meq/ Multivitamins 10 

ml/Chromium/ Copper/Manganese/ Seleni/Zn 1 ml/ Insulin Human Regular 15 unit/ 

Total Parenteral Nutrition/Amino Acids/Dextrose/ Fat Emulsion Intravenous 1,800 

ml @  75 mls/hr TPN  CONT IV  Last administered on 5/28/20at 22:33;  Start 

5/28/20 at 22:00;  Stop 5/29/20 at 21:59;  Status DC


Potassium Chloride 70 meq/ Magnesium Sulfate 20 meq/ Multivitamins 10 

ml/Chromium/ Copper/Manganese/ Seleni/Zn 1 ml/ Insulin Human Regular 15 unit/ 

Total Parenteral Nutrition/Amino Acids/Dextrose/ Fat Emulsion Intravenous 1,800 

ml @  75 mls/hr TPN  CONT IV  Last administered on 5/29/20at 23:13;  Start 

5/29/20 at 22:00;  Stop 5/30/20 at 21:59;  Status DC


Potassium Chloride 80 meq/ Magnesium Sulfate 20 meq/ Multivitamins 10 

ml/Chromium/ Copper/Manganese/ Seleni/Zn 1 ml/ Insulin Human Regular 15 unit/ 

Total Parenteral Nutrition/Amino Acids/Dextrose/ Fat Emulsion Intravenous 1,800 

ml @  75 mls/hr TPN  CONT IV  Last administered on 5/30/20at 22:30;  Start 

5/30/20 at 22:00;  Stop 5/31/20 at 21:59;  Status DC


Potassium Chloride 80 meq/ Magnesium Sulfate 20 meq/ Multivitamins 10 

ml/Chromium/ Copper/Manganese/ Seleni/Zn 1 ml/ Insulin Human Regular 15 unit/ 

Total Parenteral Nutrition/Amino Acids/Dextrose/ Fat Emulsion Intravenous 1,800 

ml @  75 mls/hr TPN  CONT IV  Last administered on 5/31/20at 21:54;  Start 

5/31/20 at 22:00;  Stop 6/1/20 at 21:59;  Status DC


Potassium Chloride/Water 100 ml @  100 mls/hr 1X  ONCE IV  Last administered on 

6/1/20at 10:15;  Start 6/1/20 at 10:00;  Stop 6/1/20 at 10:59;  Status DC


Potassium Chloride 90 meq/ Magnesium Sulfate 20 meq/ Multivitamins 10 

ml/Chromium/ Copper/Manganese/ Seleni/Zn 1 ml/ Insulin Human Regular 20 unit/ 

Total Parenteral Nutrition/Amino Acids/Dextrose/ Fat Emulsion Intravenous 1,800 

ml @  75 mls/hr TPN  CONT IV  Last administered on 6/1/20at 22:28;  Start 6/1/20

at 22:00;  Stop 6/2/20 at 21:59;  Status DC


Potassium Chloride 90 meq/ Magnesium Sulfate 20 meq/ Multivitamins 10 

ml/Chromium/ Copper/Manganese/ Seleni/Zn 1 ml/ Insulin Human Regular 20 unit/ 

Total Parenteral Nutrition/Amino Acids/Dextrose/ Fat Emulsion Intravenous 1,800 

ml @  75 mls/hr TPN  CONT IV  Last administered on 6/2/20at 22:08;  Start 6/2/20

at 22:00;  Stop 6/3/20 at 21:59;  Status DC


Lorazepam (Ativan Inj) 0.25 mg PRN Q4HRS  PRN IVP ANXIETY / AGITATION Last 

administered on 7/25/20at 13:06;  Start 6/3/20 at 07:30


Potassium Chloride 90 meq/ Magnesium Sulfate 20 meq/ Multivitamins 10 

ml/Chromium/ Copper/Manganese/ Seleni/Zn 1 ml/ Insulin Human Regular 20 unit/ 

Total Parenteral Nutrition/Amino Acids/Dextrose/ Fat Emulsion Intravenous 1,800 

ml @  75 mls/hr TPN  CONT IV  Last administered on 6/3/20at 23:13;  Start 6/3/20

at 22:00;  Stop 6/4/20 at 21:59;  Status DC


Furosemide (Lasix) 40 mg DAILY IVP  Last administered on 6/5/20at 11:14;  Start 

6/3/20 at 13:30;  Stop 6/7/20 at 09:12;  Status DC


Fluoxetine HCl (PROzac) 20 mg QHS PEG  Last administered on 7/25/20at 21:40;  

Start 6/4/20 at 21:00


Fentanyl (Duragesic 50mcg/ Hr Patch) 1 patch Q72H TD  Last administered on 

6/4/20at 21:22;  Start 6/4/20 at 21:00;  Stop 6/13/20 at 12:00;  Status DC


Potassium Chloride 40 meq/ Potassium Acetate 60 meq/Magnesium Sulfate 10 meq/ 

Multivitamins 10 ml/Chromium/ Copper/Manganese/ Seleni/Zn 1 ml/ Insulin Human 

Regular 20 unit/ Total Parenteral Nutrition/Amino Acids/Dextrose/ Fat Emulsion 

Intravenous 1,800 ml @  75 mls/hr TPN  CONT IV  Last administered on 6/5/20at 

00:03;  Start 6/4/20 at 22:00;  Stop 6/5/20 at 21:59;  Status DC


Potassium Acetate 80 meq/Magnesium Sulfate 5 meq/ Multivitamins 10 ml/Chromium/ 

Copper/Manganese/ Seleni/Zn 1 ml/ Insulin Human Regular 20 unit/ Total 

Parenteral Nutrition/Amino Acids/Dextrose/ Fat Emulsion Intravenous 1,920 ml @  

80 mls/hr TPN  CONT IV  Last administered on 6/5/20at 21:59;  Start 6/5/20 at 

22:00;  Stop 6/6/20 at 21:59;  Status DC


Potassium Acetate 60 meq/Magnesium Sulfate 5 meq/ Multivitamins 10 ml/Chromium/ 

Copper/Manganese/ Seleni/Zn 1 ml/ Insulin Human Regular 30 unit/ Total 

Parenteral Nutrition/Amino Acids/Dextrose/ Fat Emulsion Intravenous 1,920 ml @  

80 mls/hr TPN  CONT IV  Last administered on 6/6/20at 21:54;  Start 6/6/20 at 

22:00;  Stop 6/7/20 at 21:59;  Status DC


Norepinephrine Bitartrate 8 mg/ Dextrose 258 ml @  13.332 mls/ hr CONT  PRN IV 

PER PROTOCOL Last administered on 7/2/20at 09:09;  Start 6/7/20 at 06:30


Albumin Human 500 ml @  125 mls/hr 1X  ONCE IV  Last administered on 6/7/20at 

08:10;  Start 6/7/20 at 08:15;  Stop 6/7/20 at 12:14;  Status DC


Potassium Acetate 40 meq/Magnesium Sulfate 5 meq/ Multivitamins 10 ml/Chromium/ 

Copper/Manganese/ Seleni/Zn 1 ml/ Insulin Human Regular 30 unit/ Total 

Parenteral Nutrition/Amino Acids/Dextrose/ Fat Emulsion Intravenous 1,920 ml @  

80 mls/hr TPN  CONT IV  Last administered on 6/7/20at 22:23;  Start 6/7/20 at 

22:00;  Stop 6/8/20 at 21:59;  Status DC


Meropenem 1 gm/ Sodium Chloride 100 ml @  200 mls/hr Q8HRS IV ;  Start 6/7/20 at

14:00;  Status Cancel


Meropenem 1 gm/ Sodium Chloride 100 ml @  200 mls/hr Q8HRS IV  Last administered

on 6/7/20at 11:04;  Start 6/7/20 at 10:00;  Stop 6/7/20 at 13:00;  Status DC


Meropenem 1 gm/ Sodium Chloride 100 ml @  200 mls/hr Q12HR IV  Last administered

on 6/25/20at 08:27;  Start 6/7/20 at 21:00;  Stop 6/25/20 at 08:56;  Status DC


Sodium Chloride 1,000 ml @  1,000 mls/hr 1X  ONCE IV  Last administered on 

6/7/20at 11:06;  Start 6/7/20 at 10:45;  Stop 6/7/20 at 11:44;  Status DC


Micafungin Sodium 100 mg/Dextrose 100 ml @  100 mls/hr Q24H IV  Last 

administered on 6/24/20at 12:34;  Start 6/7/20 at 11:00;  Stop 6/25/20 at 08:56;

 Status DC


Daptomycin 410 mg/ Sodium Chloride 50 ml @  100 mls/hr Q24H IV  Last 

administered on 6/9/20at 13:33;  Start 6/7/20 at 14:00;  Stop 6/10/20 at 08:30; 

Status DC


Midazolam HCl (Versed) 2 mg STK-MED ONCE .ROUTE ;  Start 6/7/20 at 14:47;  Stop 

6/7/20 at 14:48;  Status DC


Fentanyl Citrate (Fentanyl 2ml Vial) 100 mcg STK-MED ONCE .ROUTE ;  Start 6/7/20

at 14:47;  Stop 6/7/20 at 14:48;  Status DC


Flumazenil (Romazicon) 0.5 mg STK-MED ONCE IV ;  Start 6/7/20 at 14:48;  Stop 

6/7/20 at 14:48;  Status DC


Naloxone HCl (Narcan) 0.4 mg STK-MED ONCE .ROUTE ;  Start 6/7/20 at 14:48;  Stop

6/7/20 at 14:48;  Status DC


Lidocaine HCl (Lidocaine 1% 20ml Vial) 20 ml STK-MED ONCE .ROUTE ;  Start 6/7/20

at 14:48;  Stop 6/7/20 at 14:48;  Status DC


Midazolam HCl (Versed) 2 mg 1X  ONCE IV  Last administered on 6/7/20at 15:28;  

Start 6/7/20 at 15:00;  Stop 6/7/20 at 15:01;  Status DC


Fentanyl Citrate (Fentanyl 2ml Vial) 100 mcg 1X  ONCE IV  Last administered on 

6/7/20at 15:28;  Start 6/7/20 at 15:00;  Stop 6/7/20 at 15:01;  Status DC


Lidocaine HCl (Lidocaine 1% 20ml Vial) 20 ml 1X  ONCE INJ  Last administered on 

6/7/20at 15:30;  Start 6/7/20 at 15:00;  Stop 6/7/20 at 15:01;  Status DC


Sodium Chloride 1,000 ml @  100 mls/hr Q10H IV  Last administered on 6/16/20at 

07:30;  Start 6/7/20 at 20:00;  Stop 6/16/20 at 11:26;  Status DC


Sodium Bicarbonate (Sodium Bicarb Adult 8.4% Syr) 50 meq 1X  ONCE IV  Last 

administered on 6/7/20at 21:47;  Start 6/7/20 at 22:00;  Stop 6/7/20 at 22:01;  

Status DC


Potassium Acetate 40 meq/Magnesium Sulfate 5 meq/ Multivitamins 10 ml/Chromium/ 

Copper/Manganese/ Seleni/Zn 1 ml/ Insulin Human Regular 30 unit/ Total 

Parenteral Nutrition/Amino Acids/Dextrose/ Fat Emulsion Intravenous 1,920 ml @  

80 mls/hr TPN  CONT IV  Last administered on 6/8/20at 22:28;  Start 6/8/20 at 

22:00;  Stop 6/9/20 at 21:59;  Status DC


Sodium Chloride 500 ml @  500 mls/hr 1X  ONCE IV  Last administered on 6/9/20at 

06:39;  Start 6/9/20 at 06:45;  Stop 6/9/20 at 07:44;  Status DC


Potassium Acetate 40 meq/Magnesium Sulfate 5 meq/ Multivitamins 10 ml/Chromium/ 

Copper/Manganese/ Seleni/Zn 1 ml/ Insulin Human Regular 30 unit/ Total 

Parenteral Nutrition/Amino Acids/Dextrose/ Fat Emulsion Intravenous 1,920 ml @  

80 mls/hr TPN  CONT IV  Last administered on 6/9/20at 22:03;  Start 6/9/20 at 

22:00;  Stop 6/10/20 at 21:59;  Status DC


Metoprolol Tartrate (Lopressor Vial) 5 mg PRN Q6HRS  PRN IVP HYPERTENSION Last 

administered on 7/25/20at 09:44;  Start 6/10/20 at 09:00


Potassium Acetate 40 meq/Magnesium Sulfate 5 meq/ Multivitamins 10 ml/Chromium/ 

Copper/Manganese/ Seleni/Zn 1 ml/ Insulin Human Regular 30 unit/ Total P

arenteral Nutrition/Amino Acids/Dextrose/ Fat Emulsion Intravenous 1,920 ml @  

80 mls/hr TPN  CONT IV  Last administered on 6/10/20at 21:26;  Start 6/10/20 at 

22:00;  Stop 6/11/20 at 21:59;  Status DC


Potassium Acetate 40 meq/Magnesium Sulfate 5 meq/ Multivitamins 10 ml/Chromium/ 

Copper/Manganese/ Seleni/Zn 1 ml/ Insulin Human Regular 30 unit/ Total 

Parenteral Nutrition/Amino Acids/Dextrose/ Fat Emulsion Intravenous 1,920 ml @  

80 mls/hr TPN  CONT IV  Last administered on 6/11/20at 23:23;  Start 6/11/20 at 

22:00;  Stop 6/12/20 at 21:59;  Status DC


Potassium Acetate 40 meq/Magnesium Sulfate 5 meq/ Multivitamins 10 ml/Chromium/ 

Copper/Manganese/ Seleni/Zn 1 ml/ Insulin Human Regular 30 unit/ Total 

Parenteral Nutrition/Amino Acids/Dextrose/ Fat Emulsion Intravenous 1,920 ml @  

80 mls/hr TPN  CONT IV  Last administered on 6/12/20at 21:35;  Start 6/12/20 at 

22:00;  Stop 6/13/20 at 21:59;  Status DC


Furosemide (Lasix) 20 mg 1X  ONCE IVP  Last administered on 6/13/20at 06:26;  

Start 6/13/20 at 06:15;  Stop 6/13/20 at 06:16;  Status DC


Methylprednisolone Sodium Succinate (SOLU-Medrol 125MG VIAL) 125 mg 1X  ONCE IV 

Last administered on 6/13/20at 06:26;  Start 6/13/20 at 06:15;  Stop 6/13/20 at 

06:16;  Status DC


Albuterol/ Ipratropium (Duoneb) 3 ml Q4HRS NEB  Last administered on 7/26/20at 

03:43;  Start 6/13/20 at 08:00


Fentanyl Citrate 30 ml @ 0 mls/hr CONT  PRN IV SEE PROTOCOL Last administered on

7/4/20at 08:03;  Start 6/13/20 at 06:00;  Stop 7/4/20 at 12:42;  Status DC


Propofol 100 ml @ 0 mls/hr CONT  PRN IV SEE PROTOCOL Last administered on 

6/20/20at 23:50;  Start 6/13/20 at 06:00


Fentanyl Citrate (Fentanyl 2ml Vial) 25 mcg PRN Q1HR  PRN IV SEE COMMENTS Last 

administered on 7/26/20at 04:17;  Start 6/13/20 at 06:00


Fentanyl Citrate (Fentanyl 2ml Vial) 50 mcg PRN Q1HR  PRN IV SEE COMMENTS Last 

administered on 7/25/20at 11:31;  Start 6/13/20 at 06:00


Chlorhexidine Gluconate (Peridex) 15 ml BID MM ;  Start 6/13/20 at 09:00;  Stop 

6/13/20 at 07:58;  Status DC


Potassium Acetate 40 meq/Magnesium Sulfate 5 meq/ Multivitamins 10 ml/Chromium/ 

Copper/Manganese/ Seleni/Zn 1 ml/ Insulin Human Regular 30 unit/ Total 

Parenteral Nutrition/Amino Acids/Dextrose/ Fat Emulsion Intravenous 1,920 ml @  

80 mls/hr TPN  CONT IV  Last administered on 6/13/20at 21:19;  Start 6/13/20 at 

22:00;  Stop 6/14/20 at 21:59;  Status DC


Acetylcysteine (Mucomyst 20% Resp Treatment) 600 mg BID NEB  Last administered 

on 6/19/20at 09:33;  Start 6/13/20 at 21:00;  Stop 6/19/20 at 10:39;  Status DC


Magnesium Sulfate 100 ml @  25 mls/hr 1X  ONCE IV  Last administered on 

6/13/20at 15:48;  Start 6/13/20 at 15:45;  Stop 6/13/20 at 19:44;  Status DC


Potassium Acetate 40 meq/Magnesium Sulfate 5 meq/ Multivitamins 10 ml/Chromium/ 

Copper/Manganese/ Seleni/Zn 1 ml/ Insulin Human Regular 30 unit/ Total 

Parenteral Nutrition/Amino Acids/Dextrose/ Fat Emulsion Intravenous 1,920 ml @  

80 mls/hr TPN  CONT IV  Last administered on 6/14/20at 21:35;  Start 6/14/20 at 

22:00;  Stop 6/15/20 at 21:59;  Status DC


Potassium Chloride/Water 100 ml @  100 mls/hr Q1H IV  Last administered on 

6/15/20at 08:31;  Start 6/15/20 at 07:00;  Stop 6/15/20 at 08:59;  Status DC


Potassium Acetate 40 meq/Magnesium Sulfate 5 meq/ Multivitamins 10 ml/Chromium/ 

Copper/Manganese/ Seleni/Zn 1 ml/ Insulin Human Regular 30 unit/ Total 

Parenteral Nutrition/Amino Acids/Dextrose/ Fat Emulsion Intravenous 1,920 ml @  

80 mls/hr TPN  CONT IV  Last administered on 6/15/20at 21:54;  Start 6/15/20 at 

22:00;  Stop 6/16/20 at 19:34;  Status DC


Lidocaine HCl (Buffered Lidocaine 1%) 3 ml STK-MED ONCE .ROUTE ;  Start 6/15/20 

at 12:14;  Stop 6/15/20 at 12:14;  Status DC


Lidocaine HCl (Buffered Lidocaine 1%) 3 ml 1X  ONCE IJ  Last administered on 

6/15/20at 13:11;  Start 6/15/20 at 13:00;  Stop 6/15/20 at 13:01;  Status DC


Magnesium Sulfate 50 ml @ 25 mls/hr 1X  ONCE IV ;  Start 6/16/20 at 08:15;  Stop

6/16/20 at 10:14;  Status DC


Potassium Acetate 40 meq/Magnesium Sulfate 10 meq/ Multivitamins 10 ml/Chromium/

Copper/Manganese/ Seleni/Zn 1 ml/ Insulin Human Regular 20 unit/ Total 

Parenteral Nutrition/Amino Acids/Dextrose/ Fat Emulsion Intravenous 1,920 ml @  

80 mls/hr TPN  CONT IV  Last administered on 6/16/20at 21:32;  Start 6/16/20 at 

22:00;  Stop 6/17/20 at 21:59;  Status DC


Potassium Chloride/Water 100 ml @  100 mls/hr Q1H IV  Last administered on 

6/17/20at 09:12;  Start 6/17/20 at 08:00;  Stop 6/17/20 at 09:59;  Status DC


Alteplase, Recombinant (Cathflo For Central Catheter Clearance) 4 mg 1X  ONCE 

INT CAT ;  Start 6/17/20 at 09:15;  Stop 6/17/20 at 09:16;  Status UNV


Alteplase, Recombinant (Cathflo For Central Catheter Clearance) 4 mg 1X  ONCE 

INT CAT ;  Start 6/17/20 at 09:15;  Stop 6/17/20 at 09:16;  Status UNV


Alteplase, Recombinant (Cathflo For Central Catheter Clearance) 4 mg 1X  ONCE 

INT CAT ;  Start 6/17/20 at 09:15;  Stop 6/17/20 at 09:16;  Status UNV


Alteplase, Recombinant 4 mg/ Sodium Chloride 20 ml @ 20 mls/hr 1X  ONCE IV  Last

administered on 6/17/20at 10:10;  Start 6/17/20 at 10:00;  Stop 6/17/20 at 

10:59;  Status DC


Alteplase, Recombinant 4 mg/ Sodium Chloride 20 ml @ 20 mls/hr 1X  ONCE IV  Last

administered on 6/17/20at 10:09;  Start 6/17/20 at 10:00;  Stop 6/17/20 at 

10:59;  Status DC


Alteplase, Recombinant 4 mg/ Sodium Chloride 20 ml @ 20 mls/hr 1X  ONCE IV  Last

administered on 6/17/20at 10:09;  Start 6/17/20 at 10:00;  Stop 6/17/20 at 

10:59;  Status DC


Potassium Acetate 60 meq/Magnesium Sulfate 10 meq/ Multivitamins 10 ml/Chromium/

Copper/Manganese/ Seleni/Zn 1 ml/ Insulin Human Regular 20 unit/ Total 

Parenteral Nutrition/Amino Acids/Dextrose/ Fat Emulsion Intravenous 1,920 ml @  

80 mls/hr TPN  CONT IV  Last administered on 6/17/20at 21:55;  Start 6/17/20 at 

22:00;  Stop 6/18/20 at 21:59;  Status DC


Albumin Human 500 ml @  125 mls/hr 1X  ONCE IV  Last administered on 6/18/20at 

12:01;  Start 6/18/20 at 11:15;  Stop 6/18/20 at 15:14;  Status DC


Sodium Chloride 500 ml @  500 mls/hr 1X  ONCE IV  Last administered on 6/18/20at

13:50;  Start 6/18/20 at 11:15;  Stop 6/18/20 at 12:14;  Status DC


Potassium Acetate 60 meq/Magnesium Sulfate 14 meq/ Multivitamins 10 ml/Chromium/

Copper/Manganese/ Seleni/Zn 1 ml/ Insulin Human Regular 20 unit/ Total 

Parenteral Nutrition/Amino Acids/Dextrose/ Fat Emulsion Intravenous 1,920 ml @  

80 mls/hr TPN  CONT IV  Last administered on 6/18/20at 22:26;  Start 6/18/20 at 

22:00;  Stop 6/19/20 at 21:59;  Status DC


Ciprofloxacin/ Dextrose 200 ml @  200 mls/hr Q12HR IV  Last administered on 

6/25/20at 08:27;  Start 6/18/20 at 21:00;  Stop 6/25/20 at 08:56;  Status DC


Albumin Human 250 ml @  62.5 mls/hr 1X  ONCE IV  Last administered on 6/19/20at 

11:09;  Start 6/19/20 at 11:00;  Stop 6/19/20 at 14:59;  Status DC


Furosemide (Lasix) 20 mg 1X  ONCE IVP  Last administered on 6/19/20at 14:52;  

Start 6/19/20 at 10:45;  Stop 6/19/20 at 10:49;  Status DC


Potassium Acetate 60 meq/Magnesium Sulfate 14 meq/ Multivitamins 10 ml/Chromium/

Copper/Manganese/ Seleni/Zn 1 ml/ Insulin Human Regular 15 unit/ Total 

Parenteral Nutrition/Amino Acids/Dextrose/ Fat Emulsion Intravenous 1,920 ml @  

80 mls/hr TPN  CONT IV  Last administered on 6/19/20at 22:08;  Start 6/19/20 at 

22:00;  Stop 6/20/20 at 21:59;  Status DC


Potassium Acetate 60 meq/Magnesium Sulfate 14 meq/ Multivitamins 10 ml/Chromium/

Copper/Manganese/ Seleni/Zn 1 ml/ Insulin Human Regular 15 unit/ Total 

Parenteral Nutrition/Amino Acids/Dextrose/ Fat Emulsion Intravenous 1,920 ml @  

80 mls/hr TPN  CONT IV  Last administered on 6/20/20at 22:12;  Start 6/20/20 at 

22:00;  Stop 6/21/20 at 21:59;  Status DC


Potassium Acetate 60 meq/Magnesium Sulfate 14 meq/ Multivitamins 10 ml/Chromium/

Copper/Manganese/ Seleni/Zn 1 ml/ Insulin Human Regular 15 unit/ Total 

Parenteral Nutrition/Amino Acids/Dextrose/ Fat Emulsion Intravenous 1,920 ml @  

80 mls/hr TPN  CONT IV  Last administered on 6/21/20at 22:22;  Start 6/21/20 at 

22:00;  Stop 6/22/20 at 21:59;  Status DC


Furosemide (Lasix) 20 mg 1X  ONCE IVP  Last administered on 6/22/20at 11:07;  

Start 6/22/20 at 10:30;  Stop 6/22/20 at 10:34;  Status DC


Potassium Acetate 60 meq/Magnesium Sulfate 14 meq/ Multivitamins 10 ml/Chromium/

Copper/Manganese/ Seleni/Zn 1 ml/ Insulin Human Regular 15 unit/ Sodium Chloride

20 meq/Total Parenteral Nutrition/Amino Acids/Dextrose/ Fat Emulsion Intravenous

1,920 ml @  80 mls/hr TPN  CONT IV  Last administered on 6/22/20at 21:54;  Start

6/22/20 at 22:00;  Stop 6/23/20 at 21:59;  Status DC


Potassium Acetate 30 meq/Magnesium Sulfate 14 meq/ Multivitamins 10 ml/Chromium/

Copper/Manganese/ Seleni/Zn 1 ml/ Insulin Human Regular 15 unit/ Sodium Chloride

20 meq/Potassium Chloride 30 meq/ Total Parenteral Nutrition/Amino 

Acids/Dextrose/ Fat Emulsion Intravenous 1,920 ml @  80 mls/hr TPN  CONT IV  

Last administered on 6/23/20at 21:46;  Start 6/23/20 at 22:00;  Stop 6/24/20 at 

21:59;  Status DC


Sodium Chloride 80 meq/Potassium Chloride 30 meq/ Potassium Acetate 30 

meq/Magnesium Sulfate 14 meq/ Multivitamins 10 ml/Chromium/ Copper/Manganese/ 

Seleni/Zn 1 ml/ Insulin Human Regular 15 unit/ Total Parenteral Nutrition/Amino 

Acids/Dextrose/ Fat Emulsion Intravenous 1,920 ml @  80 mls/hr TPN  CONT IV  

Last administered on 6/24/20at 22:33;  Start 6/24/20 at 22:00;  Stop 6/25/20 at 

21:59;  Status DC


Furosemide (Lasix) 40 mg 1X  ONCE IVP  Last administered on 6/24/20at 16:27;  

Start 6/24/20 at 15:30;  Stop 6/24/20 at 15:33;  Status DC


Albumin Human 250 ml @  62.5 mls/hr 1X  ONCE IV  Last administered on 6/24/20at 

16:27;  Start 6/24/20 at 15:30;  Stop 6/24/20 at 19:29;  Status DC


Sodium Chloride 80 meq/Potassium Chloride 30 meq/ Potassium Acetate 30 

meq/Magnesium Sulfate 14 meq/ Multivitamins 10 ml/Chromium/ Copper/Manganese/ 

Seleni/Zn 1 ml/ Insulin Human Regular 15 unit/ Total Parenteral Nutrition/Amino 

Acids/Dextrose/ Fat Emulsion Intravenous 1,920 ml @  80 mls/hr TPN  CONT IV  

Last administered on 6/25/20at 22:25;  Start 6/25/20 at 22:00;  Stop 6/26/20 at 

21:59;  Status DC


Sodium Chloride 80 meq/Potassium Chloride 30 meq/ Potassium Acetate 30 

meq/Magnesium Sulfate 14 meq/ Multivitamins 10 ml/Chromium/ Copper/Manganese/ 

Seleni/Zn 1 ml/ Insulin Human Regular 15 unit/ Total Parenteral Nutrition/Amino 

Acids/Dextrose/ Fat Emulsion Intravenous 1,920 ml @  80 mls/hr TPN  CONT IV  

Last administered on 6/26/20at 21:32;  Start 6/26/20 at 22:00;  Stop 6/27/20 at 

21:59;  Status DC


Sodium Chloride 80 meq/Potassium Chloride 30 meq/ Potassium Acetate 30 

meq/Magnesium Sulfate 14 meq/ Multivitamins 10 ml/Chromium/ Copper/Manganese/ 

Seleni/Zn 1 ml/ Insulin Human Regular 15 unit/ Total Parenteral Nutrition/Amino 

Acids/Dextrose/ Fat Emulsion Intravenous 1,920 ml @  80 mls/hr TPN  CONT IV  

Last administered on 6/27/20at 21:53;  Start 6/27/20 at 22:00;  Stop 6/28/20 at 

21:59;  Status DC


Acetylcysteine (Mucomyst 20% Resp Treatment) 600 mg RTBID NEB  Last administered

on 7/25/20at 20:16;  Start 6/27/20 at 12:00


Sodium Chloride 80 meq/Potassium Chloride 30 meq/ Potassium Acetate 30 

meq/Magnesium Sulfate 14 meq/ Multivitamins 10 ml/Chromium/ Copper/Manganese/ 

Seleni/Zn 1 ml/ Insulin Human Regular 15 unit/ Total Parenteral Nutrition/Amino 

Acids/Dextrose/ Fat Emulsion Intravenous 1,920 ml @  80 mls/hr TPN  CONT IV  

Last administered on 6/28/20at 22:06;  Start 6/28/20 at 22:00;  Stop 6/29/20 at 

21:59;  Status DC


Meropenem 500 mg/ Sodium Chloride 50 ml @  100 mls/hr Q6HRS IV  Last 

administered on 7/21/20at 06:15;  Start 6/28/20 at 18:00;  Stop 7/21/20 at 

08:23;  Status DC


Daptomycin 500 mg/ Sodium Chloride 50 ml @  100 mls/hr Q24H IV  Last 

administered on 7/6/20at 21:47;  Start 6/28/20 at 19:00;  Stop 7/7/20 at 08:13; 

Status DC


Sodium Chloride 80 meq/Potassium Chloride 30 meq/ Potassium Acetate 30 

meq/Magnesium Sulfate 14 meq/ Multivitamins 10 ml/Chromium/ Copper/Manganese/ 

Seleni/Zn 1 ml/ Insulin Human Regular 15 unit/ Total Parenteral Nutrition/Amino 

Acids/Dextrose/ Fat Emulsion Intravenous 1,920 ml @  80 mls/hr TPN  CONT IV  

Last administered on 6/29/20at 22:09;  Start 6/29/20 at 22:00;  Stop 6/30/20 at 

21:59;  Status DC


Heparin Sodium (Porcine) 1000 unit/Sodium Chloride 1,001 ml @  1,001 mls/hr 1X  

ONCE IRR ;  Start 6/30/20 at 06:00;  Stop 6/30/20 at 06:59;  Status DC


Propofol (Diprivan) 200 mg STK-MED ONCE IV ;  Start 6/30/20 at 07:44;  Stop 

6/30/20 at 07:44;  Status DC


Lidocaine HCl (Lidocaine Pf 2% Vial) 5 ml STK-MED ONCE .ROUTE ;  Start 6/30/20 

at 07:44;  Stop 6/30/20 at 07:44;  Status DC


Fentanyl Citrate (Fentanyl 2ml Vial) 100 mcg STK-MED ONCE .ROUTE ;  Start 

6/30/20 at 07:44;  Stop 6/30/20 at 07:44;  Status DC


Rocuronium Bromide (Zemuron) 100 mg STK-MED ONCE .ROUTE ;  Start 6/30/20 at 

07:44;  Stop 6/30/20 at 07:44;  Status DC


Micafungin Sodium 100 mg/Dextrose 100 ml @  100 mls/hr Q24H IV  Last 

administered on 7/25/20at 07:58;  Start 6/30/20 at 08:30


Bupivacaine HCl/ Epinephrine Bitart (Sensorcain-Epi 0.5%-1:[id number] Mpf) 30 ml 

STK-MED ONCE .ROUTE ;  Start 6/30/20 at 08:34;  Stop 6/30/20 at 08:35;  Status 

DC


Iohexol (Omnipaque 300 Mg/ml) 50 ml STK-MED ONCE .ROUTE  Last administered on 

6/30/20at 13:30;  Start 6/30/20 at 08:35;  Stop 6/30/20 at 08:35;  Status DC


Sodium Chloride 80 meq/Potassium Chloride 30 meq/ Potassium Acetate 30 

meq/Magnesium Sulfate 14 meq/ Multivitamins 10 ml/Chromium/ Copper/Manganese/ 

Seleni/Zn 1 ml/ Insulin Human Regular 15 unit/ Total Parenteral Nutrition/Amino 

Acids/Dextrose/ Fat Emulsion Intravenous 1,920 ml @  80 mls/hr TPN  CONT IV  

Last administered on 7/1/20at 01:22;  Start 6/30/20 at 22:00;  Stop 7/1/20 at 

21:59;  Status DC


Phenylephrine HCl (Ken-Synephrine Inj) 10 mg STK-MED ONCE .ROUTE ;  Start 

6/30/20 at 10:15;  Stop 6/30/20 at 10:15;  Status DC


Desflurane (Suprane) 90 ml STK-MED ONCE IH ;  Start 6/30/20 at 10:18;  Stop 

6/30/20 at 10:19;  Status DC


Albumin Human 500 ml @  As Directed STK-MED ONCE IV ;  Start 6/30/20 at 11:06;  

Stop 6/30/20 at 11:06;  Status DC


Vasopressin (Vasostrict) 20 unit STK-MED ONCE .ROUTE ;  Start 6/30/20 at 12:23; 

Stop 6/30/20 at 12:23;  Status DC


Phenylephrine HCl (Ken-Synephrine Inj) 10 mg STK-MED ONCE .ROUTE ;  Start 

6/30/20 at 13:33;  Stop 6/30/20 at 13:33;  Status DC


Phenylephrine HCl (Ken-Synephrine Inj) 10 mg STK-MED ONCE .ROUTE ;  Start 

6/30/20 at 13:33;  Stop 6/30/20 at 13:33;  Status DC


Ondansetron HCl (Zofran) 4 mg STK-MED ONCE .ROUTE ;  Start 6/30/20 at 13:33;  

Stop 6/30/20 at 13:33;  Status DC


Enoxaparin Sodium (Lovenox 40mg Syringe) 40 mg Q24H SQ  Last administered on 

7/25/20at 07:56;  Start 7/1/20 at 08:00


Sodium Chloride (Normal Saline Flush) 3 ml QSHIFT  PRN IV AFTER MEDS AND BLOOD 

DRAWS;  Start 6/30/20 at 14:45


Naloxone HCl (Narcan) 0.4 mg PRN Q2MIN  PRN IV SEE INSTRUCTIONS;  Start 6/30/20 

at 14:45


Sodium Chloride 1,000 ml @  25 mls/hr Q24H IV  Last administered on 7/21/20at 

14:54;  Start 6/30/20 at 14:33


Morphine Sulfate (Morphine Sulfate) 1 mg PRN Q1HR  PRN IV PAIN Last administered

on 7/25/20at 18:28;  Start 6/30/20 at 14:45


Midazolam HCl 100 mg/Sodium Chloride 100 ml @ 1 mls/hr CONT  PRN IV SEE I/O 

RECORD Last administered on 7/3/20at 18:48;  Start 6/30/20 at 14:45


Phenylephrine HCl (PHENYLEPHRINE in 0.9% NACL PF) 1 mg STK-MED ONCE IV ;  Start 

6/30/20 at 14:44;  Stop 6/30/20 at 14:45;  Status DC


Ephedrine Sulfate (ePHEDrine PF IN SALINE SYRINGE) 50 mg STK-MED ONCE IV ;  

Start 6/30/20 at 14:45;  Stop 6/30/20 at 14:45;  Status DC


Vasopressin 20 unit/Dextrose 101 ml @  12 mls/hr CONT  PRN IV SEE I/O RECORD 

Last administered on 7/7/20at 04:17;  Start 6/30/20 at 15:30


Sodium Chloride 1,000 ml @  1,000 mls/hr 1X  ONCE IV  Last administered on 

6/30/20at 15:42;  Start 6/30/20 at 15:45;  Stop 6/30/20 at 16:44;  Status DC


Albumin Human 500 ml @  125 mls/hr 1X  ONCE IV ;  Start 6/30/20 at 16:00;  Stop 

6/30/20 at 19:59;  Status DC


Albumin Human 500 ml @  125 mls/hr PRN Q1HR  PRN IV PER PROTOCOL;  Start 6/30/20

at 15:45


Magnesium Sulfate 50 ml @ 25 mls/hr 1X  ONCE IV  Last administered on 6/30/20at 

17:02;  Start 6/30/20 at 16:30;  Stop 6/30/20 at 18:29;  Status DC


Sodium Bicarbonate (Sodium Bicarb Adult 8.4% Syr) 50 meq STK-MED ONCE .ROUTE ;  

Start 6/30/20 at 16:20;  Stop 6/30/20 at 16:20;  Status DC


Sodium Bicarbonate (Sodium Bicarb Adult 8.4% Syr) 100 meq 1X  ONCE IV  Last 

administered on 6/30/20at 17:07;  Start 6/30/20 at 16:30;  Stop 6/30/20 at 16:3

1;  Status DC


Sodium Bicarbonate 150 meq/Dextrose 1,150 ml @  75 mls/hr 1X  ONCE IV  Last 

administered on 6/30/20at 20:02;  Start 6/30/20 at 16:30;  Stop 7/1/20 at 07:49;

 Status DC


Sodium Chloride 80 meq/Potassium Chloride 30 meq/ Potassium Acetate 30 meq

/Magnesium Sulfate 14 meq/ Multivitamins 10 ml/Chromium/ Copper/Manganese/ 

Seleni/Zn 1 ml/ Insulin Human Regular 15 unit/ Total Parenteral Nutrition/Amino 

Acids/Dextrose/ Fat Emulsion Intravenous 1,920 ml @  80 mls/hr TPN  CONT IV  

Last administered on 7/1/20at 23:05;  Start 7/1/20 at 22:00;  Stop 7/2/20 at 

21:59;  Status DC


Sodium Chloride 100 meq/Potassium Chloride 30 meq/ Potassium Acetate 30 

meq/Magnesium Sulfate 12 meq/ Multivitamins 10 ml/Chromium/ Copper/Manganese/ 

Seleni/Zn 1 ml/ Insulin Human Regular 15 unit/ Total Parenteral Nutrition/Amino 

Acids/Dextrose/ Fat Emulsion Intravenous 1,920 ml @  80 mls/hr TPN  CONT IV  

Last administered on 7/2/20at 21:52;  Start 7/2/20 at 22:00;  Stop 7/3/20 at 

21:59;  Status DC


Sodium Chloride 100 meq/Potassium Chloride 30 meq/ Potassium Acetate 30 

meq/Magnesium Sulfate 12 meq/ Multivitamins 10 ml/Chromium/ Copper/Manganese/ 

Seleni/Zn 1 ml/ Insulin Human Regular 15 unit/ Total Parenteral Nutrition/Amino 

Acids/Dextrose/ Fat Emulsion Intravenous 1,920 ml @  80 mls/hr TPN  CONT IV  

Last administered on 7/3/20at 21:46;  Start 7/3/20 at 22:00;  Stop 7/4/20 at 

21:59;  Status DC


Sodium Chloride 100 meq/Potassium Chloride 30 meq/ Potassium Acetate 30 

meq/Magnesium Sulfate 12 meq/ Multivitamins 10 ml/Chromium/ Copper/Manganese/ 

Seleni/Zn 1 ml/ Insulin Human Regular 15 unit/ Total Parenteral Nutrition/Amino 

Acids/Dextrose/ Fat Emulsion Intravenous 1,800 ml @  75 mls/hr TPN  CONT IV  

Last administered on 7/4/20at 22:04;  Start 7/4/20 at 22:00;  Stop 7/5/20 at 

21:59;  Status DC


Fentanyl Citrate 55 ml @ 0 mls/hr CONT  PRN IV SEE COMMENTS Last administered on

7/6/20at 23:55;  Start 7/4/20 at 13:00;  Stop 7/9/20 at 17:28;  Status DC


Sodium Chloride 100 meq/Potassium Chloride 30 meq/ Potassium Acetate 30 

meq/Magnesium Sulfate 12 meq/ Multivitamins 10 ml/Chromium/ Copper/Manganese/ 

Seleni/Zn 1 ml/ Insulin Human Regular 15 unit/ Total Parenteral Nutrition/Amino 

Acids/Dextrose/ Fat Emulsion Intravenous 1,680 ml @  70 mls/hr TPN  CONT IV  

Last administered on 7/5/20at 21:23;  Start 7/5/20 at 22:00;  Stop 7/6/20 at 

21:59;  Status DC


Sodium Chloride 110 meq/Potassium Chloride 30 meq/ Potassium Acetate 30 

meq/Magnesium Sulfate 15 meq/ Multivitamins 10 ml/Chromium/ Copper/Manganese/ 

Seleni/Zn 1 ml/ Insulin Human Regular 15 unit/ Total Parenteral Nutrition/Amino 

Acids/Dextrose/ Fat Emulsion Intravenous 1,680 ml @  70 mls/hr TPN  CONT IV  

Last administered on 7/6/20at 21:48;  Start 7/6/20 at 22:00;  Stop 7/7/20 at 

21:59;  Status DC


Sodium Chloride 110 meq/Potassium Chloride 30 meq/ Potassium Acetate 30 

meq/Magnesium Sulfate 15 meq/ Multivitamins 10 ml/Chromium/ Copper/Manganese/ 

Seleni/Zn 1 ml/ Insulin Human Regular 15 unit/ Total Parenteral Nutrition/Amino 

Acids/Dextrose/ Fat Emulsion Intravenous 1,680 ml @  70 mls/hr TPN  CONT IV  

Last administered on 7/7/20at 21:33;  Start 7/7/20 at 22:00;  Stop 7/8/20 at 

21:59;  Status DC


Sodium Chloride 110 meq/Potassium Chloride 30 meq/ Potassium Acetate 30 

meq/Magnesium Sulfate 15 meq/ Multivitamins 10 ml/Chromium/ Copper/Manganese/ 

Seleni/Zn 1 ml/ Insulin Human Regular 15 unit/ Total Parenteral Nutrition/Amino 

Acids/Dextrose/ Fat Emulsion Intravenous 1,680 ml @  70 mls/hr TPN  CONT IV  

Last administered on 7/8/20at 21:51;  Start 7/8/20 at 22:00;  Stop 7/9/20 at 

21:59;  Status DC


Sodium Chloride 90 meq/Potassium Chloride 30 meq/ Potassium Acetate 30 

meq/Magnesium Sulfate 15 meq/ Multivitamins 10 ml/Chromium/ Copper/Manganese/ 

Seleni/Zn 1 ml/ Insulin Human Regular 15 unit/ Total Parenteral Nutrition/Amino 

Acids/Dextrose/ Fat Emulsion Intravenous 1,680 ml @  70 mls/hr TPN  CONT IV  

Last administered on 7/9/20at 22:38;  Start 7/9/20 at 22:00;  Stop 7/10/20 at 

21:59;  Status DC


Fentanyl Citrate 30 ml @ 0 mls/hr CONT  PRN IV SEE I/O RECORD;  Start 7/9/20 at 

17:30


Fentanyl (Duragesic 12mcg/ Hr Patch) 1 patch Q3DAYS TD  Last administered on 

7/25/20at 09:09;  Start 7/10/20 at 09:00


Sodium Chloride 90 meq/Potassium Chloride 30 meq/ Potassium Acetate 30 

meq/Magnesium Sulfate 15 meq/ Multivitamins 10 ml/Chromium/ Copper/Manganese/ 

Seleni/Zn 1 ml/ Insulin Human Regular 15 unit/ Total Parenteral Nutrition/Amino 

Acids/Dextrose/ Fat Emulsion Intravenous 1,680 ml @  70 mls/hr TPN  CONT IV  

Last administered on 7/10/20at 21:59;  Start 7/10/20 at 22:00;  Stop 7/11/20 at 

21:59;  Status DC


Sodium Chloride 90 meq/Potassium Chloride 30 meq/ Potassium Acetate 30 

meq/Magnesium Sulfate 15 meq/ Multivitamins 10 ml/Chromium/ Copper/Manganese/ 

Seleni/Zn 1 ml/ Insulin Human Regular 15 unit/ Total Parenteral Nutrition/Amino 

Acids/Dextrose/ Fat Emulsion Intravenous 1,680 ml @  70 mls/hr TPN  CONT IV  

Last administered on 7/11/20at 21:35;  Start 7/11/20 at 22:00;  Stop 7/12/20 at 

21:59;  Status DC


Vancomycin HCl (Vanco Per Pharmacy) 1 each PRN DAILY  PRN MC SEE COMMENTS Last 

administered on 7/14/20at 02:46;  Start 7/12/20 at 09:15;  Stop 7/15/20 at 

07:41;  Status DC


Ciprofloxacin/ Dextrose 200 ml @  200 mls/hr Q12HR IV  Last administered on 

7/20/20at 21:02;  Start 7/12/20 at 10:00;  Stop 7/21/20 at 08:20;  Status DC


Vancomycin HCl 2 gm/Sodium Chloride 500 ml @  250 mls/hr 1X  ONCE IV  Last 

administered on 7/12/20at 10:34;  Start 7/12/20 at 10:00;  Stop 7/12/20 at 

11:59;  Status DC


Sodium Chloride 90 meq/Potassium Chloride 30 meq/ Potassium Acetate 30 

meq/Magnesium Sulfate 15 meq/ Multivitamins 10 ml/Chromium/ Copper/Manganese/ 

Seleni/Zn 1 ml/ Insulin Human Regular 15 unit/ Total Parenteral Nutrition/Amino 

Acids/Dextrose/ Fat Emulsion Intravenous 1,680 ml @  70 mls/hr TPN  CONT IV  

Last administered on 7/12/20at 22:02;  Start 7/12/20 at 22:00;  Stop 7/13/20 at 

21:59;  Status DC


Diphenhydramine HCl (Benadryl) 25 mg 1X  ONCE IVP  Last administered on 

7/12/20at 14:26;  Start 7/12/20 at 14:30;  Stop 7/12/20 at 14:31;  Status DC


Vancomycin HCl 1.5 gm/Sodium Chloride 500 ml @  250 mls/hr Q8H IV  Last 

administered on 7/13/20at 03:08;  Start 7/12/20 at 18:30;  Stop 7/13/20 at 

12:24;  Status DC


Vancomycin HCl (Vancomycin Trough Level) 1 each 1X  ONCE MC  Last administered 

on 7/13/20at 10:00;  Start 7/13/20 at 10:00;  Stop 7/13/20 at 10:01;  Status DC


Sodium Chloride 90 meq/Potassium Chloride 30 meq/ Potassium Acetate 30 

meq/Magnesium Sulfate 15 meq/ Multivitamins 10 ml/Chromium/ Copper/Manganese/ 

Seleni/Zn 1 ml/ Insulin Human Regular 15 unit/ Total Parenteral Nutrition/Amino 

Acids/Dextrose/ Fat Emulsion Intravenous 1,680 ml @  70 mls/hr TPN  CONT IV  

Last administered on 7/13/20at 22:13;  Start 7/13/20 at 22:00;  Stop 7/14/20 at 

21:59;  Status DC


Vancomycin HCl (Vancomycin Random Level) 1 each 1X  ONCE MC  Last administered 

on 7/14/20at 01:00;  Start 7/14/20 at 01:00;  Stop 7/14/20 at 01:01;  Status DC


Vancomycin HCl 1.5 gm/Sodium Chloride 500 ml @  250 mls/hr Q12H IV  Last 

administered on 7/14/20at 22:07;  Start 7/14/20 at 10:00;  Stop 7/15/20 at 

07:41;  Status DC


Vancomycin HCl (Vancomycin Trough Level) 1 each 1X  ONCE MC ;  Start 7/15/20 at 

09:30;  Stop 7/15/20 at 09:31;  Status Cancel


Sodium Chloride 90 meq/Potassium Chloride 30 meq/ Potassium Acetate 30 

meq/Magnesium Sulfate 15 meq/ Multivitamins 10 ml/Chromium/ Copper/Manganese/ 

Seleni/Zn 1 ml/ Insulin Human Regular 15 unit/ Total Parenteral Nutrition/Amino 

Acids/Dextrose/ Fat Emulsion Intravenous 1,680 ml @  70 mls/hr TPN  CONT IV  

Last administered on 7/14/20at 22:08;  Start 7/14/20 at 22:00;  Stop 7/15/20 at 

21:59;  Status DC


Alteplase, Recombinant (Cathflo For Central Catheter Clearance) 1 mg 1X  ONCE 

INT CAT  Last administered on 7/14/20at 11:49;  Start 7/14/20 at 11:00;  Stop 

7/14/20 at 11:01;  Status DC


Daptomycin 500 mg/ Sodium Chloride 50 ml @  100 mls/hr Q24H IV  Last 

administered on 7/24/20at 08:25;  Start 7/15/20 at 09:00;  Stop 7/24/20 at 

08:38;  Status DC


Sodium Chloride 90 meq/Potassium Chloride 30 meq/ Potassium Acetate 30 

meq/Magnesium Sulfate 15 meq/ Multivitamins 10 ml/Chromium/ Copper/Manganese/ 

Seleni/Zn 1 ml/ Insulin Human Regular 15 unit/ Total Parenteral Nutrition/Amino 

Acids/Dextrose/ Fat Emulsion Intravenous 1,680 ml @  70 mls/hr TPN  CONT IV  

Last administered on 7/15/20at 22:55;  Start 7/15/20 at 22:00;  Stop 7/16/20 at 

21:59;  Status DC


Sodium Chloride 90 meq/Potassium Chloride 30 meq/ Potassium Acetate 30 

meq/Magnesium Sulfate 15 meq/ Multivitamins 10 ml/Chromium/ Copper/Manganese/ 

Seleni/Zn 1 ml/ Insulin Human Regular 15 unit/ Total Parenteral Nutrition/Amino 

Acids/Dextrose/ Fat Emulsion Intravenous 1,680 ml @  70 mls/hr TPN  CONT IV  

Last administered on 7/16/20at 22:06;  Start 7/16/20 at 22:00;  Stop 7/17/20 at 

21:59;  Status DC


Diphenhydramine HCl (Benadryl) 50 mg STK-MED ONCE .ROUTE ;  Start 7/16/20 at 

18:34;  Stop 7/16/20 at 18:35;  Status DC


Diphenhydramine HCl (Benadryl) 25 mg 1X  ONCE IM ;  Start 7/16/20 at 18:45;  

Stop 7/16/20 at 18:46;  Status DC


Diphenhydramine HCl (Benadryl) 25 mg 1X  ONCE IVP  Last administered on 

7/16/20at 18:56;  Start 7/16/20 at 19:00;  Stop 7/16/20 at 19:01;  Status DC


Alprazolam (Xanax) 0.5 mg PRN TID  PRN PO ANXIETY / AGITATION Last administered 

on 7/23/20at 11:49;  Start 7/17/20 at 08:00;  Stop 7/23/20 at 15:54;  Status DC


Sodium Chloride 110 meq/Potassium Chloride 30 meq/ Potassium Acetate 30 

meq/Magnesium Sulfate 15 meq/ Multivitamins 10 ml/Chromium/ Copper/Manganese/ 

Seleni/Zn 1 ml/ Insulin Human Regular 15 unit/ Total Parenteral Nutrition/Amino 

Acids/Dextrose/ Fat Emulsion Intravenous 1,680 ml @  70 mls/hr TPN  CONT IV  

Last administered on 7/17/20at 21:21;  Start 7/17/20 at 22:00;  Stop 7/18/20 at 

21:59;  Status DC


Sodium Chloride 110 meq/Potassium Chloride 30 meq/ Potassium Acetate 30 

meq/Magnesium Sulfate 15 meq/ Multivitamins 10 ml/Chromium/ Copper/Manganese/ 

Seleni/Zn 1 ml/ Insulin Human Regular 15 unit/ Total Parenteral Nutrition/Amino 

Acids/Dextrose/ Fat Emulsion Intravenous 1,680 ml @  70 mls/hr TPN  CONT IV  

Last administered on 7/18/20at 22:01;  Start 7/18/20 at 22:00;  Stop 7/19/20 at 

21:59;  Status DC


Alteplase, Recombinant (Cathflo For Central Catheter Clearance) 1 mg 1X  ONCE 

INT CAT  Last administered on 7/19/20at 08:23;  Start 7/19/20 at 08:15;  Stop 

7/19/20 at 08:16;  Status DC


Sodium Chloride 110 meq/Sodium Phosphate 10 mmol/ Potassium Chloride 30 meq/ 

Potassium Acetate 30 meq/Magnesium Sulfate 15 meq/ Multivitamins 10 ml/Chromium/

Copper/Manganese/ Seleni/Zn 1 ml/ Insulin Human Regular 15 unit/ Total 

Parenteral Nutrition/Amino Acids/Dextrose/ Fat Emulsion Intravenous 1,680 ml @  

70 mls/hr TPN  CONT IV  Last administered on 7/19/20at 22:03;  Start 7/19/20 at 

22:00;  Stop 7/20/20 at 21:59;  Status DC


Sodium Chloride 120 meq/Sodium Phosphate 10 mmol/ Potassium Chloride 30 meq/ 

Potassium Acetate 30 meq/Magnesium Sulfate 15 meq/ Multivitamins 10 ml/Chromium/

Copper/Manganese/ Seleni/Zn 1 ml/ Insulin Human Regular 15 unit/ Total 

Parenteral Nutrition/Amino Acids/Dextrose/ Fat Emulsion Intravenous 1,680 ml @  

70 mls/hr TPN  CONT IV  Last administered on 7/20/20at 22:08;  Start 7/20/20 at 

22:00;  Stop 7/21/20 at 21:59;  Status DC


Ceftazidime/ Avibactam 2.5 gm/ Sodium Chloride 100 ml @  50 mls/hr Q8HRS IV  

Last administered on 7/22/20at 05:32;  Start 7/21/20 at 14:00;  Stop 7/22/20 at 

07:48;  Status DC


Alteplase, Recombinant (Cathflo For Central Catheter Clearance) 1 mg 1X  ONCE 

INT CAT  Last administered on 7/21/20at 09:30;  Start 7/21/20 at 09:30;  Stop 

7/21/20 at 09:31;  Status DC


Sodium Chloride 120 meq/Sodium Phosphate 10 mmol/ Potassium Chloride 30 meq/ 

Potassium Acetate 30 meq/Magnesium Sulfate 15 meq/ Multivitamins 10 ml/Chromium/

Copper/Manganese/ Seleni/Zn 1 ml/ Insulin Human Regular 15 unit/ Total 

Parenteral Nutrition/Amino Acids/Dextrose/ Fat Emulsion Intravenous 1,680 ml @  

70 mls/hr TPN  CONT IV  Last administered on 7/21/20at 22:11;  Start 7/21/20 at 

22:00;  Stop 7/22/20 at 21:59;  Status DC


Iohexol (Omnipaque 300 Mg/ml) 75 ml 1X  ONCE IV  Last administered on 7/21/20at 

11:30;  Start 7/21/20 at 11:30;  Stop 7/21/20 at 11:31;  Status DC


Info (CONTRAST GIVEN -- Rx MONITORING) 1 each PRN DAILY  PRN MC SEE COMMENTS;  

Start 7/21/20 at 11:45;  Stop 7/23/20 at 11:44;  Status DC


Alteplase, Recombinant (Cathflo For Central Catheter Clearance) 1 mg 1X  ONCE 

INT CAT  Last administered on 7/21/20at 12:17;  Start 7/21/20 at 12:00;  Stop 

7/21/20 at 12:01;  Status DC


Cefepime HCl (Maxipime) 2 gm Q12HR IVP  Last administered on 7/22/20at 20:53;  

Start 7/22/20 at 09:00;  Stop 7/23/20 at 07:30;  Status DC


Sodium Chloride 120 meq/Sodium Phosphate 10 mmol/ Potassium Chloride 30 meq/ 

Potassium Acetate 30 meq/Magnesium Sulfate 15 meq/ Multivitamins 10 ml/Chromium/

Copper/Manganese/ Seleni/Zn 1 ml/ Insulin Human Regular 15 unit/ Total 

Parenteral Nutrition/Amino Acids/Dextrose/ Fat Emulsion Intravenous 1,680 ml @  

70 mls/hr TPN  CONT IV  Last administered on 7/22/20at 21:25;  Start 7/22/20 at 

22:00;  Stop 7/23/20 at 21:59;  Status DC


Ceftazidime/ Avibactam 2.5 gm/ Sodium Chloride 250 ml @  125 mls/hr Q8HRS IV  La

st administered on 7/26/20at 06:19;  Start 7/23/20 at 08:00


Sodium Chloride 120 meq/Sodium Phosphate 10 mmol/ Potassium Chloride 30 meq/ 

Potassium Acetate 30 meq/Magnesium Sulfate 15 meq/ Multivitamins 10 ml/Chromium/

Copper/Manganese/ Seleni/Zn 1 ml/ Insulin Human Regular 15 unit/ Total 

Parenteral Nutrition/Amino Acids/Dextrose/ Fat Emulsion Intravenous 1,680 ml @  

70 mls/hr TPN  CONT IV  Last administered on 7/23/20at 22:35;  Start 7/23/20 at 

22:00;  Stop 7/24/20 at 21:59;  Status DC


Alprazolam (Xanax) 0.5 mg PRN QID  PRN PO ANXIETY / AGITATION Last administered 

on 7/25/20at 00:57;  Start 7/23/20 at 16:00


Acetaminophen/ Hydrocodone Bitart (Lortab 5/325) 1 tab PRN Q4HRS  PRN PO PAIN 

Last administered on 7/24/20at 19:34;  Start 7/23/20 at 16:00


Sodium Chloride 120 meq/Sodium Phosphate 10 mmol/ Potassium Chloride 30 meq/ 

Potassium Acetate 30 meq/Magnesium Sulfate 15 meq/ Multivitamins 10 ml/Chromium/

Copper/Manganese/ Seleni/Zn 1 ml/ Insulin Human Regular 15 unit/ Total 

Parenteral Nutrition/Amino Acids/Dextrose/ Fat Emulsion Intravenous 1,680 ml @  

70 mls/hr TPN  CONT IV  Last administered on 7/24/20at 21:50;  Start 7/24/20 at 

22:00;  Stop 7/25/20 at 21:59;  Status DC


Sodium Chloride 120 meq/Sodium Phosphate 10 mmol/ Potassium Chloride 30 meq/ 

Potassium Acetate 30 meq/Magnesium Sulfate 15 meq/ Multivitamins 10 ml/Chromium/

Copper/Manganese/ Seleni/Zn 1 ml/ Insulin Human Regular 15 unit/ Total 

Parenteral Nutrition/Amino Acids/Dextrose/ Fat Emulsion Intravenous 1,680 ml @  

70 mls/hr TPN  CONT IV  Last administered on 7/25/20at 22:14;  Start 7/25/20 at 

22:00;  Stop 7/26/20 at 21:59


Daptomycin 500 mg/ Sodium Chloride 50 ml @  100 mls/hr Q24H IV ;  Start 7/26/20 

at 09:00





Active Scripts


Active


Reported


Bisoprolol Fumarate 5 Mg Tablet 10 Mg PO DAILY


Vitals/I & O





Vital Sign - Last 24 Hours








 7/25/20 7/25/20 7/25/20 7/25/20





 08:58 09:09 09:44 11:00


 


Temp    99.3





    99.3


 


Pulse   139 129


 


B/P (MAP)   147/73 141/84 (103)


 


Pulse Ox 93 97  99


 


O2 Delivery Nasal Cannula Room Air  Room Air


 


O2 Flow Rate 2.0 2.0  2.0


 


    





    





 7/25/20 7/25/20 7/25/20 7/25/20





 11:20 11:31 12:01 13:09


 


Pulse Ox 98 98 98 98


 


O2 Delivery Nasal Cannula Nasal Cannula Nasal Cannula Nasal Cannula


 


O2 Flow Rate 2.0 2.0  2.0





 7/25/20 7/25/20 7/25/20 7/25/20





 15:00 15:38 18:28 18:58


 


Temp 99.2   





 99.2   


 


Pulse 129   


 


Resp    30


 


B/P (MAP) 122/79 (93)   


 


Pulse Ox 95 98 98 100


 


O2 Delivery Room Air Nasal Cannula Nasal Cannula Room Air


 


O2 Flow Rate 2.0 2.0 2.0 


 


    





    





 7/25/20 7/25/20 7/25/20 7/25/20





 19:00 20:00 20:15 22:00


 


Temp 102.1   99.7





 102.1   99.7


 


Pulse 134   


 


Resp 30   


 


B/P (MAP) 136/76 (96)   


 


Pulse Ox 100  96 


 


O2 Delivery Room Air Room Air Nasal Cannula 


 


O2 Flow Rate   2.0 


 


    





    





 7/25/20 7/26/20 7/26/20 7/26/20





 23:00 00:16 03:00 03:46


 


Temp   98.7 





   98.7 


 


Pulse 116  123 


 


Resp 24  26 


 


B/P (MAP) 113/65 (81)  124/72 (89) 


 


Pulse Ox 100 100 99 100


 


O2 Delivery Room Air Nasal Cannula Nasal Cannula Nasal Cannula


 


O2 Flow Rate  2.0 2.0 2.0


 


    





    





 7/26/20 7/26/20  





 04:17 04:47  


 


Resp 28 26  


 


Pulse Ox 99 93  


 


O2 Delivery Nasal Cannula Nasal Cannula  


 


O2 Flow Rate  2.0  














Intake and Output   


 


 7/25/20 7/25/20 7/26/20





 15:00 23:00 07:00


 


Intake Total  850 ml 2185 ml


 


Output Total 860 ml 1460 ml 745 ml


 


Balance -860 ml -610 ml 1440 ml











Justicifation of Admission Dx:


Justifications for Admission:


Justification of Admission Dx:  Yes











OVIDIO SARAVIA MD          Jul 26, 2020 07:46

## 2020-07-26 NOTE — NUR
Pharmacy TPN Dosing Note



S: JESENIA BEAN is a 49 year old F Currently receiving Central Continuous TPN started 
03/18/20



B:Pertinent PMH: 

Necrotizing pancreatitis

Height: 5 feet, 8 inches

Weight: 83.7 kg



Current diet: NPO 



LABS:

Sodium:    138 

Potassium: 4.4 

Chloride:  106 

Calcium:   10.3 

Corrected Calcium: 12.62 

Magnesium: 1.9 (7/23) 

CO2:       27 

SCr:       0.6 

Glucose:   120-139 

Albumin:   1.1 

AST:       16 

ALT:       15 



TPN FORMULA:

TPN TYPE:  Central Continuous

AMINO ACIDS:         95 gm

DEXTROSE:            275 gm

LIPIDS:              30 gm

SODIUM CHLORIDE:     120 mEq

SODIUM ACETATE:      - mEq

SODIUM PHOSPHATE:    10 mmol

POTASSIUM CHLORIDE:  30 mEq

POTASSIUM ACETATE:   30 mEq

POTASSIUM PHOSPHATE: - mmol

MAGNESIUM:           15 mEq

CALCIUM:             - mEq

INSULIN:             15 units

MULTIPLE VITAMIN:    10 ml

TRACE ELEMENTS:      1 ml ml(s)



TPN PLAN:  

Labs stable. No changes to TPN. BMP tomorrow.





R: Continue TPN AS ABOVE.

Will monitor electrolytes, glucose, and tolerance to TPN.



 CANDACE BELTRE [facility], 07/26/20 0816

## 2020-07-26 NOTE — PDOC
SURGICAL PROGRESS NOTE


Subjective


No acute changes


Vital Signs





Vital Signs








  Date Time  Temp Pulse Resp B/P (MAP) Pulse Ox O2 Delivery O2 Flow Rate FiO2


 


7/26/20 08:28     96 Nasal Cannula 2.0 


 


7/26/20 07:00 99.9 126 28 104/47 (66)    





 99.9       








I&O











Intake and Output 


 


 7/26/20





 07:00


 


Intake Total 3035 ml


 


Output Total 3065 ml


 


Balance -30 ml


 


 


 


IV Total 3035 ml


 


Output Urine Total 1810 ml


 


Gastric Drainage Total 450 ml


 


Drainage Total 805 ml








PATIENT HAS A VILLASENOR:  Yes


General:  Alert, mild distress


Abdomen:  Normal bowel sounds, Soft, Other (Wound VAC in place.  Abdominal drain

with fair amount of drainage around the drain tube)


Labs





Laboratory Tests








Test


 7/24/20


12:24 7/24/20


17:23 7/24/20


23:05 7/25/20


04:30


 


Glucose (Fingerstick)


 156 mg/dL


(70-99) 123 mg/dL


(70-99) 138 mg/dL


(70-99) 





 


Clostridium difficile Toxin


(PCR) 


 


 


 Negative


(NEGATIVE)


 


Test


 7/25/20


04:45 7/25/20


05:48 7/25/20


11:36 7/25/20


17:48


 


Sodium Level


 137 mmol/L


(136-145) 


 


 





 


Potassium Level


 4.3 mmol/L


(3.5-5.1) 


 


 





 


Chloride Level


 103 mmol/L


() 


 


 





 


Carbon Dioxide Level


 27 mmol/L


(21-32) 


 


 





 


Anion Gap 7 (6-14)    


 


Blood Urea Nitrogen


 15 mg/dL


(7-20) 


 


 





 


Creatinine


 0.6 mg/dL


(0.6-1.0) 


 


 





 


Estimated GFR


(Cockcroft-Gault) 106.3 


 


 


 





 


Glucose Level


 140 mg/dL


(70-99) 


 


 





 


Calcium Level


 10.0 mg/dL


(8.5-10.1) 


 


 





 


Phosphorus Level


 4.2 mg/dL


(2.6-4.7) 


 


 





 


Glucose (Fingerstick)


 


 138 mg/dL


(70-99) 145 mg/dL


(70-99) 132 mg/dL


(70-99)


 


Test


 7/25/20


23:49 7/26/20


05:55 7/26/20


06:00 7/26/20


07:45


 


Glucose (Fingerstick)


 139 mg/dL


(70-99) 


 120 mg/dL


(70-99) 





 


White Blood Count


 


 14.7 x10^3/uL


(4.0-11.0) 


 





 


Red Blood Count


 


 2.78 x10^6/uL


(3.50-5.40) 


 





 


Hemoglobin


 


 7.7 g/dL


(12.0-15.5) 


 





 


Hematocrit


 


 23.8 %


(36.0-47.0) 


 





 


Mean Corpuscular Volume  86 fL ()   


 


Mean Corpuscular Hemoglobin  28 pg (25-35)   


 


Mean Corpuscular Hemoglobin


Concent 


 33 g/dL


(31-37) 


 





 


Red Cell Distribution Width


 


 16.0 %


(11.5-14.5) 


 





 


Platelet Count


 


 563 x10^3/uL


(140-400) 


 





 


Neutrophils (%) (Auto)  83 % (31-73)   


 


Lymphocytes (%) (Auto)  8 % (24-48)   


 


Monocytes (%) (Auto)  7 % (0-9)   


 


Eosinophils (%) (Auto)  3 % (0-3)   


 


Basophils (%) (Auto)  0 % (0-3)   


 


Neutrophils # (Auto)


 


 12.2 x10^3/uL


(1.8-7.7) 


 





 


Lymphocytes # (Auto)


 


 1.2 x10^3/uL


(1.0-4.8) 


 





 


Monocytes # (Auto)


 


 1.0 x10^3/uL


(0.0-1.1) 


 





 


Eosinophils # (Auto)


 


 0.4 x10^3/uL


(0.0-0.7) 


 





 


Basophils # (Auto)


 


 0.0 x10^3/uL


(0.0-0.2) 


 





 


Sodium Level


 


 138 mmol/L


(136-145) 


 





 


Potassium Level


 


 4.4 mmol/L


(3.5-5.1) 


 





 


Chloride Level


 


 106 mmol/L


() 


 





 


Carbon Dioxide Level


 


 29 mmol/L


(21-32) 


 





 


Anion Gap  3 (6-14)   


 


Blood Urea Nitrogen


 


 15 mg/dL


(7-20) 


 





 


Creatinine


 


 0.5 mg/dL


(0.6-1.0) 


 





 


Estimated GFR


(Cockcroft-Gault) 


 131.1 


 


 





 


BUN/Creatinine Ratio  30 (6-20)   


 


Glucose Level


 


 123 mg/dL


(70-99) 


 





 


Calcium Level


 


 10.3 mg/dL


(8.5-10.1) 


 





 


Total Bilirubin


 


 0.4 mg/dL


(0.2-1.0) 


 





 


Aspartate Amino Transf


(AST/SGOT) 


 14 U/L (15-37) 


 


 





 


Alanine Aminotransferase


(ALT/SGPT) 


 12 U/L (14-59) 


 


 





 


Alkaline Phosphatase


 


 119 U/L


() 


 





 


Total Protein


 


 5.9 g/dL


(6.4-8.2) 


 





 


Albumin


 


 1.1 g/dL


(3.4-5.0) 


 





 


Albumin/Globulin Ratio  0.2 (1.0-1.7)   


 


Urine Collection Type    Unknown 


 


Urine Color    Yellow 


 


Urine Clarity    Clear 


 


Urine pH    5.0 (<5.0-8.0) 


 


Urine Specific Gravity


 


 


 


 1.020


(1.000-1.030)


 


Urine Protein


 


 


 


 30 mg/dL


(NEG-TRACE)


 


Urine Glucose (UA)


 


 


 


 Negative mg/dL


(NEG)


 


Urine Ketones (Stick)


 


 


 


 Negative mg/dL


(NEG)


 


Urine Blood    Negative (NEG) 


 


Urine Nitrite    Negative (NEG) 


 


Urine Bilirubin    Negative (NEG) 


 


Urine Urobilinogen Dipstick


 


 


 


 0.2 mg/dL (0.2


mg/dL)


 


Urine Leukocyte Esterase    Small (NEG) 


 


Urine RBC    0 /HPF (0-2) 


 


Urine WBC


 


 


 


 20-40 /HPF


(0-4)


 


Urine Bacteria


 


 


 


 Moderate /HPF


(0-FEW)


 


Urine Hyaline Casts    Moderate /HPF 


 


Urine Mucus    Marked /LPF 


 


Urine Yeast    Present /HPF 








Laboratory Tests








Test


 7/25/20


11:36 7/25/20


17:48 7/25/20


23:49 7/26/20


05:55


 


Glucose (Fingerstick)


 145 mg/dL


(70-99) 132 mg/dL


(70-99) 139 mg/dL


(70-99) 





 


White Blood Count


 


 


 


 14.7 x10^3/uL


(4.0-11.0)


 


Red Blood Count


 


 


 


 2.78 x10^6/uL


(3.50-5.40)


 


Hemoglobin


 


 


 


 7.7 g/dL


(12.0-15.5)


 


Hematocrit


 


 


 


 23.8 %


(36.0-47.0)


 


Mean Corpuscular Volume    86 fL () 


 


Mean Corpuscular Hemoglobin    28 pg (25-35) 


 


Mean Corpuscular Hemoglobin


Concent 


 


 


 33 g/dL


(31-37)


 


Red Cell Distribution Width


 


 


 


 16.0 %


(11.5-14.5)


 


Platelet Count


 


 


 


 563 x10^3/uL


(140-400)


 


Neutrophils (%) (Auto)    83 % (31-73) 


 


Lymphocytes (%) (Auto)    8 % (24-48) 


 


Monocytes (%) (Auto)    7 % (0-9) 


 


Eosinophils (%) (Auto)    3 % (0-3) 


 


Basophils (%) (Auto)    0 % (0-3) 


 


Neutrophils # (Auto)


 


 


 


 12.2 x10^3/uL


(1.8-7.7)


 


Lymphocytes # (Auto)


 


 


 


 1.2 x10^3/uL


(1.0-4.8)


 


Monocytes # (Auto)


 


 


 


 1.0 x10^3/uL


(0.0-1.1)


 


Eosinophils # (Auto)


 


 


 


 0.4 x10^3/uL


(0.0-0.7)


 


Basophils # (Auto)


 


 


 


 0.0 x10^3/uL


(0.0-0.2)


 


Sodium Level


 


 


 


 138 mmol/L


(136-145)


 


Potassium Level


 


 


 


 4.4 mmol/L


(3.5-5.1)


 


Chloride Level


 


 


 


 106 mmol/L


()


 


Carbon Dioxide Level


 


 


 


 29 mmol/L


(21-32)


 


Anion Gap    3 (6-14) 


 


Blood Urea Nitrogen


 


 


 


 15 mg/dL


(7-20)


 


Creatinine


 


 


 


 0.5 mg/dL


(0.6-1.0)


 


Estimated GFR


(Cockcroft-Gault) 


 


 


 131.1 





 


BUN/Creatinine Ratio    30 (6-20) 


 


Glucose Level


 


 


 


 123 mg/dL


(70-99)


 


Calcium Level


 


 


 


 10.3 mg/dL


(8.5-10.1)


 


Total Bilirubin


 


 


 


 0.4 mg/dL


(0.2-1.0)


 


Aspartate Amino Transf


(AST/SGOT) 


 


 


 14 U/L (15-37) 





 


Alanine Aminotransferase


(ALT/SGPT) 


 


 


 12 U/L (14-59) 





 


Alkaline Phosphatase


 


 


 


 119 U/L


()


 


Total Protein


 


 


 


 5.9 g/dL


(6.4-8.2)


 


Albumin


 


 


 


 1.1 g/dL


(3.4-5.0)


 


Albumin/Globulin Ratio    0.2 (1.0-1.7) 


 


Test


 7/26/20


06:00 7/26/20


07:45 


 





 


Glucose (Fingerstick)


 120 mg/dL


(70-99) 


 


 





 


Urine Collection Type  Unknown   


 


Urine Color  Yellow   


 


Urine Clarity  Clear   


 


Urine pH  5.0 (<5.0-8.0)   


 


Urine Specific Gravity


 


 1.020


(1.000-1.030) 


 





 


Urine Protein


 


 30 mg/dL


(NEG-TRACE) 


 





 


Urine Glucose (UA)


 


 Negative mg/dL


(NEG) 


 





 


Urine Ketones (Stick)


 


 Negative mg/dL


(NEG) 


 





 


Urine Blood  Negative (NEG)   


 


Urine Nitrite  Negative (NEG)   


 


Urine Bilirubin  Negative (NEG)   


 


Urine Urobilinogen Dipstick


 


 0.2 mg/dL (0.2


mg/dL) 


 





 


Urine Leukocyte Esterase  Small (NEG)   


 


Urine RBC  0 /HPF (0-2)   


 


Urine WBC


 


 20-40 /HPF


(0-4) 


 





 


Urine Bacteria


 


 Moderate /HPF


(0-FEW) 


 





 


Urine Hyaline Casts  Moderate /HPF   


 


Urine Mucus  Marked /LPF   


 


Urine Yeast  Present /HPF   








Problem List


Problems


Medical Problems:


(1) Acute pancreatitis


Status: Acute  





(2) Cholelithiasis


Status: Acute  








Assessment/Plan


Post pancreatic debridement


Infectious disease would like PICC line removed will start PPN for 24 hours then

replace PICC line other side


Continue supportive care





Justicifation of Admission Dx:


Justifications for Admission:


Justification of Admission Dx:  Yes











OVIDIO MEDINA MD             Jul 26, 2020 10:16

## 2020-07-26 NOTE — PDOC
PULMONARY PROGRESS NOTES


Subjective


Patient with no distress


fever again


Vitals





Vital Signs








  Date Time  Temp Pulse Resp B/P (MAP) Pulse Ox O2 Delivery O2 Flow Rate FiO2


 


7/26/20 08:28     96 Nasal Cannula 2.0 


 


7/26/20 07:00 99.9 126 28 104/47 (66)    





 99.9       








ROS:  No Nausea, No Chest Pain, No Increase Cough


General:  Alert


HEENT:  Other (trach site ok)


Lungs:  Crackles


Cardiovascular:  S1, S2


Abdomen:  Soft, Non-tender, Other (multiple ROBERT drains )


Neuro Exam:  Alert


Extremities:  Other (+1 BLE edema)


Skin:  Warm


Labs





Laboratory Tests








Test


 7/24/20


12:24 7/24/20


17:23 7/24/20


23:05 7/25/20


04:30


 


Glucose (Fingerstick)


 156 mg/dL


(70-99) 123 mg/dL


(70-99) 138 mg/dL


(70-99) 





 


Clostridium difficile Toxin


(PCR) 


 


 


 Negative


(NEGATIVE)


 


Test


 7/25/20


04:45 7/25/20


05:48 7/25/20


11:36 7/25/20


17:48


 


Sodium Level


 137 mmol/L


(136-145) 


 


 





 


Potassium Level


 4.3 mmol/L


(3.5-5.1) 


 


 





 


Chloride Level


 103 mmol/L


() 


 


 





 


Carbon Dioxide Level


 27 mmol/L


(21-32) 


 


 





 


Anion Gap 7 (6-14)    


 


Blood Urea Nitrogen


 15 mg/dL


(7-20) 


 


 





 


Creatinine


 0.6 mg/dL


(0.6-1.0) 


 


 





 


Estimated GFR


(Cockcroft-Gault) 106.3 


 


 


 





 


Glucose Level


 140 mg/dL


(70-99) 


 


 





 


Calcium Level


 10.0 mg/dL


(8.5-10.1) 


 


 





 


Phosphorus Level


 4.2 mg/dL


(2.6-4.7) 


 


 





 


Glucose (Fingerstick)


 


 138 mg/dL


(70-99) 145 mg/dL


(70-99) 132 mg/dL


(70-99)


 


Test


 7/25/20


23:49 7/26/20


05:55 7/26/20


06:00 7/26/20


07:45


 


Glucose (Fingerstick)


 139 mg/dL


(70-99) 


 120 mg/dL


(70-99) 





 


White Blood Count


 


 14.7 x10^3/uL


(4.0-11.0) 


 





 


Red Blood Count


 


 2.78 x10^6/uL


(3.50-5.40) 


 





 


Hemoglobin


 


 7.7 g/dL


(12.0-15.5) 


 





 


Hematocrit


 


 23.8 %


(36.0-47.0) 


 





 


Mean Corpuscular Volume  86 fL ()   


 


Mean Corpuscular Hemoglobin  28 pg (25-35)   


 


Mean Corpuscular Hemoglobin


Concent 


 33 g/dL


(31-37) 


 





 


Red Cell Distribution Width


 


 16.0 %


(11.5-14.5) 


 





 


Platelet Count


 


 563 x10^3/uL


(140-400) 


 





 


Neutrophils (%) (Auto)  83 % (31-73)   


 


Lymphocytes (%) (Auto)  8 % (24-48)   


 


Monocytes (%) (Auto)  7 % (0-9)   


 


Eosinophils (%) (Auto)  3 % (0-3)   


 


Basophils (%) (Auto)  0 % (0-3)   


 


Neutrophils # (Auto)


 


 12.2 x10^3/uL


(1.8-7.7) 


 





 


Lymphocytes # (Auto)


 


 1.2 x10^3/uL


(1.0-4.8) 


 





 


Monocytes # (Auto)


 


 1.0 x10^3/uL


(0.0-1.1) 


 





 


Eosinophils # (Auto)


 


 0.4 x10^3/uL


(0.0-0.7) 


 





 


Basophils # (Auto)


 


 0.0 x10^3/uL


(0.0-0.2) 


 





 


Sodium Level


 


 138 mmol/L


(136-145) 


 





 


Potassium Level


 


 4.4 mmol/L


(3.5-5.1) 


 





 


Chloride Level


 


 106 mmol/L


() 


 





 


Carbon Dioxide Level


 


 29 mmol/L


(21-32) 


 





 


Anion Gap  3 (6-14)   


 


Blood Urea Nitrogen


 


 15 mg/dL


(7-20) 


 





 


Creatinine


 


 0.5 mg/dL


(0.6-1.0) 


 





 


Estimated GFR


(Cockcroft-Gault) 


 131.1 


 


 





 


BUN/Creatinine Ratio  30 (6-20)   


 


Glucose Level


 


 123 mg/dL


(70-99) 


 





 


Calcium Level


 


 10.3 mg/dL


(8.5-10.1) 


 





 


Total Bilirubin


 


 0.4 mg/dL


(0.2-1.0) 


 





 


Aspartate Amino Transf


(AST/SGOT) 


 14 U/L (15-37) 


 


 





 


Alanine Aminotransferase


(ALT/SGPT) 


 12 U/L (14-59) 


 


 





 


Alkaline Phosphatase


 


 119 U/L


() 


 





 


Total Protein


 


 5.9 g/dL


(6.4-8.2) 


 





 


Albumin


 


 1.1 g/dL


(3.4-5.0) 


 





 


Albumin/Globulin Ratio  0.2 (1.0-1.7)   


 


Urine Collection Type    Unknown 


 


Urine Color    Yellow 


 


Urine Clarity    Clear 


 


Urine pH    5.0 (<5.0-8.0) 


 


Urine Specific Gravity


 


 


 


 1.020


(1.000-1.030)


 


Urine Protein


 


 


 


 30 mg/dL


(NEG-TRACE)


 


Urine Glucose (UA)


 


 


 


 Negative mg/dL


(NEG)


 


Urine Ketones (Stick)


 


 


 


 Negative mg/dL


(NEG)


 


Urine Blood    Negative (NEG) 


 


Urine Nitrite    Negative (NEG) 


 


Urine Bilirubin    Negative (NEG) 


 


Urine Urobilinogen Dipstick


 


 


 


 0.2 mg/dL (0.2


mg/dL)


 


Urine Leukocyte Esterase    Small (NEG) 


 


Urine RBC    0 /HPF (0-2) 


 


Urine WBC


 


 


 


 20-40 /HPF


(0-4)


 


Urine Bacteria


 


 


 


 Moderate /HPF


(0-FEW)


 


Urine Hyaline Casts    Moderate /HPF 


 


Urine Mucus    Marked /LPF 


 


Urine Yeast    Present /HPF 








Laboratory Tests








Test


 7/25/20


11:36 7/25/20


17:48 7/25/20


23:49 7/26/20


05:55


 


Glucose (Fingerstick)


 145 mg/dL


(70-99) 132 mg/dL


(70-99) 139 mg/dL


(70-99) 





 


White Blood Count


 


 


 


 14.7 x10^3/uL


(4.0-11.0)


 


Red Blood Count


 


 


 


 2.78 x10^6/uL


(3.50-5.40)


 


Hemoglobin


 


 


 


 7.7 g/dL


(12.0-15.5)


 


Hematocrit


 


 


 


 23.8 %


(36.0-47.0)


 


Mean Corpuscular Volume    86 fL () 


 


Mean Corpuscular Hemoglobin    28 pg (25-35) 


 


Mean Corpuscular Hemoglobin


Concent 


 


 


 33 g/dL


(31-37)


 


Red Cell Distribution Width


 


 


 


 16.0 %


(11.5-14.5)


 


Platelet Count


 


 


 


 563 x10^3/uL


(140-400)


 


Neutrophils (%) (Auto)    83 % (31-73) 


 


Lymphocytes (%) (Auto)    8 % (24-48) 


 


Monocytes (%) (Auto)    7 % (0-9) 


 


Eosinophils (%) (Auto)    3 % (0-3) 


 


Basophils (%) (Auto)    0 % (0-3) 


 


Neutrophils # (Auto)


 


 


 


 12.2 x10^3/uL


(1.8-7.7)


 


Lymphocytes # (Auto)


 


 


 


 1.2 x10^3/uL


(1.0-4.8)


 


Monocytes # (Auto)


 


 


 


 1.0 x10^3/uL


(0.0-1.1)


 


Eosinophils # (Auto)


 


 


 


 0.4 x10^3/uL


(0.0-0.7)


 


Basophils # (Auto)


 


 


 


 0.0 x10^3/uL


(0.0-0.2)


 


Sodium Level


 


 


 


 138 mmol/L


(136-145)


 


Potassium Level


 


 


 


 4.4 mmol/L


(3.5-5.1)


 


Chloride Level


 


 


 


 106 mmol/L


()


 


Carbon Dioxide Level


 


 


 


 29 mmol/L


(21-32)


 


Anion Gap    3 (6-14) 


 


Blood Urea Nitrogen


 


 


 


 15 mg/dL


(7-20)


 


Creatinine


 


 


 


 0.5 mg/dL


(0.6-1.0)


 


Estimated GFR


(Cockcroft-Gault) 


 


 


 131.1 





 


BUN/Creatinine Ratio    30 (6-20) 


 


Glucose Level


 


 


 


 123 mg/dL


(70-99)


 


Calcium Level


 


 


 


 10.3 mg/dL


(8.5-10.1)


 


Total Bilirubin


 


 


 


 0.4 mg/dL


(0.2-1.0)


 


Aspartate Amino Transf


(AST/SGOT) 


 


 


 14 U/L (15-37) 





 


Alanine Aminotransferase


(ALT/SGPT) 


 


 


 12 U/L (14-59) 





 


Alkaline Phosphatase


 


 


 


 119 U/L


()


 


Total Protein


 


 


 


 5.9 g/dL


(6.4-8.2)


 


Albumin


 


 


 


 1.1 g/dL


(3.4-5.0)


 


Albumin/Globulin Ratio    0.2 (1.0-1.7) 


 


Test


 7/26/20


06:00 7/26/20


07:45 


 





 


Glucose (Fingerstick)


 120 mg/dL


(70-99) 


 


 





 


Urine Collection Type  Unknown   


 


Urine Color  Yellow   


 


Urine Clarity  Clear   


 


Urine pH  5.0 (<5.0-8.0)   


 


Urine Specific Gravity


 


 1.020


(1.000-1.030) 


 





 


Urine Protein


 


 30 mg/dL


(NEG-TRACE) 


 





 


Urine Glucose (UA)


 


 Negative mg/dL


(NEG) 


 





 


Urine Ketones (Stick)


 


 Negative mg/dL


(NEG) 


 





 


Urine Blood  Negative (NEG)   


 


Urine Nitrite  Negative (NEG)   


 


Urine Bilirubin  Negative (NEG)   


 


Urine Urobilinogen Dipstick


 


 0.2 mg/dL (0.2


mg/dL) 


 





 


Urine Leukocyte Esterase  Small (NEG)   


 


Urine RBC  0 /HPF (0-2)   


 


Urine WBC


 


 20-40 /HPF


(0-4) 


 





 


Urine Bacteria


 


 Moderate /HPF


(0-FEW) 


 





 


Urine Hyaline Casts  Moderate /HPF   


 


Urine Mucus  Marked /LPF   


 


Urine Yeast  Present /HPF   








Medications





Active Scripts








 Medications  Dose


 Route/Sig


 Max Daily Dose Days Date Category


 


 Bisoprolol


 Fumarate 5 Mg


 Tablet  10 Mg


 PO DAILY


   3/16/20 Reported








Comments








ct reviewed 7/12/20, Decreased left-sided effusion after catheter placement. The




right-sided effusion has increased as has atelectasis.


 





 


There has been exchange or placement of multiple drainage 


tubes and a gastrojejunostomy tube. Both collections are smaller. No 


significant new abdominal fluid collection is seen. The jejunal component 


of the gastrojejunostomy tube appears to be looped in the proximal small 


bowel. 











ct abdomen /pelvis 6/6


1. Removal of the percutaneous pigtail drainage catheters since the prior 


exam. Sequela of pancreatitis with extensive pseudocysts again 


demonstrated, the right-sided collections are slightly larger since the 


prior exam, the left-sided collections are stable. See above.


2. Moderate to large left pleural effusion with atelectasis and collapse 


of most of the left lower lobe, stable. Small right pleural effusion is 


stable.


3. Gallstone.





ct chest 6/15 reviewed








 GRAM NEG COCCOBACILLI:MANY


        SQUAMOUS EPI CELL:RARE


        PMN (WBCs):[id number]


        Unless otherwise specified, Testing Performed by:


        [address] [address]


        For Inquires, the Physician may contact the Microbiology


        department at [phone]





  RESPIRATORY CULTURE  Final  


        Final





        MANY GRAM NEGATIVE RODS on 06/15/20 at 7055


        FINAL ID= [PSEUDOMONAS AERUGINOSA]


        MICRO CHARGES


        PSEUDOMONAS AERUGINOSA





  ANTIMICROBIAL SUSCEPTIBILITY  Final  


        Comment





        NEG ANSON 56


        PSEUDOMONAS AERUGINOSA


        ANTIBIOTIC                        RESULT          INTERPRETATION


        AMIKACIN                          <=16                  S


        AZTREONAM                         <=4                   S


        CEFTAZIDIME                       <=1                   S


        CIPROFLOXACIN                     <=0.25                S


        CEFEPIME                          <=2                   S


        CEFTAZIDIME/AVIBACTAM             <=4                   S


        GENTAMICIN                        <=2                   S


        LEVOFLOXACIN                      <=0.5                 S





Impression


.





IMPRESSION:


1.  Acute hypoxemic respiratory failure secondary to ARDS status post trach, 

developed anemia 6/7, blood drainage from RLQ abdomen drain site, and 

surrounding firmness  / developed septic shock 6/7 from abdomen source, required

levo 6/7


s/p 3 new drains 6/7 with brown color drainage,  


S/P Exp. 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 

parapsilosis & PSAE (I-merrem/ceftazidime/AZT/cefepime))


Leucocytosis -trending upward 


Fever 


Acute gallstone pancreatitis with persistent necrosis


  - 4/9.  CT A/P Increased ascites. Persistent evidence of necrotizing 

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


  - 4/27. status post ROBERT drain placement; C. parapsilosis. s/p drain 5/6 + yeast

& high amylase; s/p additional drain on 5/8. Drains removed. 


  -5/6. fluid  candida parapsilosis fluid, amylase high


  - 6/6 showed multiple pseudocysts, slight larger on the right. s/p drains x 3,

6/7.  + PSAE (MDRO-R Cefepime, Zosyn ANSON < 64) and yeast, 


  -6/7 s/p drain replacement x 3; fluid cult PSAE (MDRO), yeast; treated


  -7/12 CT A/P shows smaller fluid collections.  


  -722 CT abdomen and pelvis drains in place       


Ascites s/p paracentesis 4/15 & 5/6. C. parapsilosis 


Cholelithiasis with thickening of the gallbladder wall.


JED, Hyperkalemia, Metabolic acidosis off dialysis


Acute hypoxic resp failure. trach/vent. sputum 6/13  + PSAE (I merrem) ; sputum 

culture July 19+ for PSAE R Merrem, sensitive to cefepime


Pleural effusion status post CTS left side


 Abdominal fluid culture 6 /7 MDRO Pseudomonas, yeast


Sputum culture positive 7/19 for MDRO Pseudomonas


Chest tube fluid positive for 7/21 Candida Parapsilosis


S/P Exploratory laparotomy, lysis of adhesions, subtotal cholecystectomy with 

cholangiogram, gastrojejunostomy tube placement, pancreatic necrosectomy


leukocytosis- improving 





7/23 fluid from the chest tube


  GRAM STAIN  Final  


        Final





        NO ORGANISMS SEEN.


        SQUAMOUS EPI CELL:NOT APPLICABLE


        PMN (WBCs):[id number]


        Unless otherwise specified, Testing Performed by:


        [address] [address]


        For Inquires, the Physician may contact the Microbiology


        department at [phone]





  ANAEROBIC-AEROBIC CULTURE  Preliminary  


        Preliminary





        No Growth on 07/22/20 at 0957


        Unless otherwise specified, Testing Performed by:


        [facility]


        [address] [place] [address]


        For Inquires, the Physician may contact the Microbiology


        department at [phone]











Antibiotics per ID, since 7/23


Restart Avycaz 


DC cefepime


cont micafungin and dapto





Plan


.


Fever/ per ID


chest tube REMOVED 7/24


Transfuse as needed


Antibiotics per ID 


Discussed with RN 


ASK SURGERY TO CONSIDER USING GUT FOR NUTRITION


Monitor H&H


Trach shield, as tolerated, on room air


Up to chair, pt/ot


Follow culture


Follow surgery input


DVT GI prophylaxis


f/u BC /resp cultures 


DVT/GI PPX











SHARYN SOLORZANO MD                 Jul 26, 2020 09:38

## 2020-07-26 NOTE — PDOC
Infectious Disease Note


Subjective:


Subjective


Pt resting quietly


Febrile again, T-max 102


On 2 L O2 by nasal cannula


TPN





Vital Signs:


Vital Signs





Vital Signs








  Date Time  Temp Pulse Resp B/P (MAP) Pulse Ox O2 Delivery O2 Flow Rate FiO2


 


7/26/20 04:47   26  93 Nasal Cannula 2.0 


 


7/26/20 03:00 98.7 123  124/72 (89)    





 98.7       











Physical Exam:


PHYSICAL EXAM


GENERAL: pt in bed, appears weak


HEENT: Pupils equal, oral cavity dry. NGT out


NECK:  Tracheostomy 


LUNGS: Diminished aeration bases,  CT on left 


HEART:  S1, S2,  tachy 110s


ABDOMEN: Distended, bowel sounds hypoactive, soft, richardson x 2, ROBERT drains, G-J 

tube


: [place] in place 


EXTREMITIES: Trace generalized edema, no cyanosis. MARTHA hose bilaterally, 


SKIN: warm touch. No signs of rash.  


LUE-PICC without signs of complications





Medications:


Inpatient Meds:





Current Medications








 Medications


  (Trade)  Dose


 Ordered  Sig/Yee  Start Time


 Stop Time Status Last Admin


Dose Admin


 


 Acetaminophen


  (Tylenol Supp)  650 mg  PRN Q6HRS  PRN  3/24/20 10:30


    7/25/20 19:52


650 MG


 


 Acetaminophen


  (Tylenol)  650 mg  PRN Q6HRS  PRN  3/21/20 03:36


 5/13/20 10:25 DC 4/16/20 19:56


650 MG


 


 Acetaminophen/


 Hydrocodone Bitart


  (Lortab 5/325)  1 tab  PRN Q4HRS  PRN  7/23/20 16:00


    7/24/20 19:34


1 TAB


 


 Acetylcysteine


  (Mucomyst 20%


 Resp Treatment)  600 mg  RTBID  6/27/20 12:00


    7/25/20 20:16


600 MG


 


 Albumin Human  500 ml @ 


 125 mls/hr  PRN Q1HR  PRN  6/30/20 15:45


     





 


 Albuterol Sulfate


  (Ventolin Neb


 Soln)  2.5 mg  1X  ONCE  3/17/20 22:30


 3/17/20 22:31 DC 3/18/20 00:56


2.5 MG


 


 Albuterol/


 Ipratropium


  (Duoneb)  3 ml  Q4HRS  6/13/20 08:00


    7/26/20 03:43


3 ML


 


 Alprazolam


  (Xanax)  0.5 mg  PRN QID  PRN  7/23/20 16:00


    7/25/20 00:57


0.5 MG


 


 Alteplase,


 Recombinant


  (Cathflo For


 Central Catheter


 Clearance)  1 mg  1X  ONCE  7/21/20 12:00


 7/21/20 12:01 DC 7/21/20 12:17


1 MG


 


 Alteplase,


 Recombinant 4 mg/


 Sodium Chloride  20 ml @ 20


 mls/hr  1X  ONCE  6/17/20 10:00


 6/17/20 10:59 DC 6/17/20 10:09


20 MLS/HR


 


 Amino Acids/


 Glycerin/


 Electrolytes  1,000 ml @ 


 75 mls/hr  [id number]  4/20/20 21:15


   UNV  





 


 Artificial Tears


  (Artificial


 Tears)  1 drop  PRN Q15MIN  PRN  4/29/20 05:30


    6/23/20 21:17


1 DROP


 


 Atenolol


  (Tenormin)  100 mg  DAILY  3/17/20 09:00


 3/16/20 20:08 DC  





 


 Atropine Sulfate


  (ATROPINE 0.5mg


 SYRINGE)  0.5 mg  PRN Q5MIN  PRN  4/2/20 08:15


     





 


 Barium Sulfate


  (Varibar Thin


 Liquid Apple)  148 gm  1X  ONCE  5/26/20 11:45


 5/26/20 11:49 DC  





 


 Benzocaine


  (Hurricaine One)  1 spray  1X  ONCE  3/20/20 14:30


 3/20/20 14:31 DC 3/20/20 16:38


1 SPRAY


 


 Bisacodyl


  (Dulcolax Supp)  10 mg  STK-MED ONCE  4/27/20 10:59


 4/27/20 10:59 DC  





 


 Bumetanide


  (Bumex)  2 mg  DAILY  5/8/20 10:00


 5/18/20 17:15 DC 5/18/20 08:07


2 MG


 


 Bupivacaine HCl/


 Epinephrine Bitart


  (Sensorcain-Epi


 0.5%-1:992215 Mpf)  30 ml  STK-MED ONCE  6/30/20 08:34


 6/30/20 08:35 DC  





 


 Calcium Carbonate/


 Glycine


  (Tums)  500 mg  PRN [id number]  PRN  3/18/20 17:45


 5/13/20 10:25 DC  





 


 Calcium Chloride


 1000 mg/Sodium


 Chloride  110 ml @ 


 220 mls/hr  1X  ONCE  3/17/20 22:30


 3/17/20 22:59 DC 3/17/20 22:11


220 MLS/HR


 


 Calcium Chloride


 3000 mg/Sodium


 Chloride  1,030 ml @ 


 50 mls/hr  [id number]  3/19/20 08:00


 3/21/20 15:23 DC 3/21/20 02:17


50 MLS/HR


 


 Calcium Gluconate


  (Calcium


 Gluconate)  2,000 mg  1X  ONCE  3/19/20 02:15


 3/19/20 02:16 DC 3/19/20 02:19


2,000 MG


 


 Calcium Gluconate


 1000 mg/Sodium


 Chloride  110 ml @ 


 220 mls/hr  1X  ONCE  3/18/20 03:30


 3/18/20 03:59 DC 3/18/20 03:21


220 MLS/HR


 


 Calcium Gluconate


 2000 mg/Sodium


 Chloride  120 ml @ 


 220 mls/hr  1X  ONCE  3/18/20 07:30


 3/18/20 08:02 DC 3/18/20 09:05


220 MLS/HR


 


 Cefepime HCl


  (Maxipime)  2 gm  Q12HR  7/22/20 09:00


 7/23/20 07:30 DC 7/22/20 20:53


2 GM


 


 Ceftazidime/


 Avibactam 2.5 gm/


 Sodium Chloride  250 ml @ 


 125 mls/hr  Q8HRS  7/23/20 08:00


    7/26/20 06:19


125 MLS/HR


 


 Cellulose


  (Surgicel


 Fibrillar 1x2)  1 each  STK-MED ONCE  4/6/20 11:00


 4/6/20 11:01 DC  





 


 Cellulose


  (Surgicel


 Hemostat 2x14)  1 each  STK-MED ONCE  4/27/20 10:58


 4/27/20 10:59 DC  





 


 Cellulose


  (Surgicel


 Hemostat 4x8)  1 each  STK-MED ONCE  4/27/20 10:58


 4/27/20 10:59 DC  





 


 Chlorhexidine


 Gluconate


  (Peridex)  15 ml  BID  6/13/20 09:00


 6/13/20 07:58 DC  





 


 Ciprofloxacin/


 Dextrose  200 ml @ 


 200 mls/hr  Q12HR  7/12/20 10:00


 7/21/20 08:20 DC 7/20/20 21:02


200 MLS/HR


 


 Cyclobenzaprine


 HCl


  (Flexeril)  10 mg  PRN Q6HRS  PRN  4/30/20 10:45


    7/10/20 19:12


10 MG


 


 Daptomycin 410 mg/


 Sodium Chloride  50 ml @ 


 100 mls/hr  Q24H  6/7/20 14:00


 6/10/20 08:30 DC 6/9/20 13:33


100 MLS/HR


 


 Daptomycin 430 mg/


 Sodium Chloride  50 ml @ 


 100 mls/hr  Q24H  4/25/20 13:00


 4/30/20 20:58 DC 4/30/20 13:00


100 MLS/HR


 


 Daptomycin 450 mg/


 Sodium Chloride  50 ml @ 


 100 mls/hr  Q24H  5/17/20 09:00


 5/21/20 08:30 DC 5/20/20 09:25


100 MLS/HR


 


 Daptomycin 485 mg/


 Sodium Chloride  50 ml @ 


 100 mls/hr  Q24H  5/4/20 11:00


 5/12/20 07:44 DC 5/11/20 13:10


100 MLS/HR


 


 Daptomycin 500 mg/


 Sodium Chloride  50 ml @ 


 100 mls/hr  Q24H  7/15/20 09:00


 7/24/20 08:38 DC 7/24/20 08:25


100 MLS/HR


 


 Desflurane


  (Suprane)  90 ml  STK-MED ONCE  6/30/20 10:18


 6/30/20 10:19 DC  





 


 Dexamethasone


 Sodium Phosphate


  (Decadron)  4 mg  STK-MED ONCE  4/27/20 10:56


 4/27/20 10:57 DC  





 


 Dexmedetomidine


 HCl 400 mcg/


 Sodium Chloride  100 ml @ 0


 mls/hr  CONT  PRN  4/2/20 08:15


 5/30/20 18:31 DC 5/30/20 12:57


8 MLS/HR


 


 Dextrose


  (Dextrose


 50%-Water Syringe)  12.5 gm  PRN Q15MIN  PRN  3/16/20 09:30


     





 


 Digoxin


  (Lanoxin)  125 mcg  1X  ONCE  3/19/20 18:00


 3/19/20 18:01 DC 3/19/20 17:10


125 MCG


 


 Diphenhydramine


 HCl


  (Benadryl)  25 mg  1X  ONCE  7/16/20 19:00


 7/16/20 19:01 DC 7/16/20 18:56


25 MG


 


 Duloxetine HCl


  (Cymbalta)  30 mg  DAILY  5/10/20 14:00


 5/13/20 10:25 DC 5/11/20 09:48


30 MG


 


 Enoxaparin Sodium


  (Lovenox 100mg


 Syringe)  100 mg  Q12HR  4/21/20 21:00


   UNV  





 


 Enoxaparin Sodium


  (Lovenox 40mg


 Syringe)  40 mg  Q24H  7/1/20 08:00


    7/25/20 07:56


40 MG


 


 Ephedrine Sulfate


  (ePHEDrine PF IN


 SALINE SYRINGE)  50 mg  STK-MED ONCE  6/30/20 14:45


 6/30/20 14:45 DC  





 


 Etomidate


  (Amidate)  8 mg  1X  ONCE  3/23/20 08:30


 3/23/20 08:31 DC 3/23/20 08:33


8 MG


 


 Fentanyl


  (Duragesic 12mcg/


 Hr Patch)  1 patch  Q3DAYS  7/10/20 09:00


    7/25/20 09:09


1 PATCH


 


 Fentanyl


  (Duragesic 50mcg/


 Hr Patch)  1 patch  Q72H  6/4/20 21:00


 6/13/20 12:00 DC 6/4/20 21:22


1 PATCH


 


 Fentanyl Citrate  30 ml @ 0


 mls/hr  CONT  PRN  7/9/20 17:30


     





 


 Fentanyl Citrate


  (Fentanyl 2ml


 Vial)  100 mcg  STK-MED ONCE  6/30/20 07:44


 6/30/20 07:44 DC  





 


 Fentanyl Citrate


  (Fentanyl 5ml


 Vial)  250 mcg  1X  ONCE  5/8/20 09:15


 5/8/20 09:16 DC 5/8/20 09:30


50 MCG


 


 Flumazenil


  (Romazicon)  0.5 mg  STK-MED ONCE  6/7/20 14:48


 6/7/20 14:48 DC  





 


 Fluoxetine HCl


  (PROzac)  20 mg  QHS  6/4/20 21:00


    7/25/20 21:40


20 MG


 


 Furosemide


  (Lasix)  40 mg  1X  ONCE  6/24/20 15:30


 6/24/20 15:33 DC 6/24/20 16:27


40 MG


 


 Haloperidol


 Lactate


  (Haldol Inj)  3 mg  1X  ONCE  5/4/20 14:30


 5/4/20 14:31 DC 5/4/20 14:37


3 MG


 


 Heparin Sodium


  (Porcine)


  (Hep Lock Adult)  500 unit  STK-MED ONCE  4/7/20 09:29


 4/7/20 09:30 DC  





 


 Heparin Sodium


  (Porcine)


  (Heparin Sodium)  5,000 unit  Q12HR  4/27/20 21:00


 5/7/20 09:59 DC 5/6/20 20:57


5,000 UNIT


 


 Heparin Sodium


  (Porcine) 1000


 unit/Sodium


 Chloride  1,001 ml @ 


 1,001 mls/hr  1X  ONCE  6/30/20 06:00


 6/30/20 06:59 DC  





 


 Hydromorphone HCl


  (Dilaudid


 Standard PCA)  12 mg  STK-MED ONCE  5/1/20 15:50


 5/12/20 11:24 DC  





 


 Hydromorphone HCl


  (Dilaudid)  1 mg  PRN Q4HRS  PRN  5/4/20 19:00


 5/18/20 17:10 DC 5/18/20 06:25


1 MG


 


 Info


  (CONTRAST GIVEN


 -- Rx MONITORING)  1 each  PRN DAILY  PRN  7/21/20 11:45


 7/23/20 11:44 DC  





 


 Info


  (Icu Electrolyte


 Protocol)  1 ea  CONT PRN  PRN  3/29/20 13:15


     





 


 Info


  (PHARMACY


 MONITORING -- do


 not chart)  1 each  PRN DAILY  PRN  4/24/20 15:45


 5/26/20 14:14 DC  





 


 Info


  (Tpn Per


 Pharmacy)  1 each  PRN DAILY  PRN  3/18/20 12:30


   UNV  





 


 Insulin Human


 Lispro


  (HumaLOG)  0-9 UNITS  Q6HRS  3/16/20 09:30


    7/24/20 12:26


4 UNITS


 


 Insulin Human


 Regular


  (HumuLIN R VIAL)  5 unit  1X  ONCE  3/17/20 22:30


 3/17/20 22:31 DC 3/17/20 22:14


5 UNIT


 


 Iohexol


  (Omnipaque 240


 Mg/ml)  30 ml  1X  ONCE  3/30/20 11:30


 3/30/20 11:33 DC 3/30/20 11:30


30 ML


 


 Iohexol


  (Omnipaque 300


 Mg/ml)  75 ml  1X  ONCE  7/21/20 11:30


 7/21/20 11:31 DC 7/21/20 11:30


75 ML


 


 Iohexol


  (Omnipaque 350


 Mg/ml)  90 ml  1X  ONCE  3/16/20 03:30


 3/16/20 03:31 DC 3/16/20 03:25


90 ML


 


 Ketorolac


 Tromethamine


  (Toradol 30mg


 Vial)  30 mg  1X  ONCE  3/16/20 03:00


 3/16/20 03:01 DC 3/16/20 02:54


30 MG


 


 Lidocaine HCl


  (Buffered


 Lidocaine 1%)  3 ml  1X  ONCE  6/15/20 13:00


 6/15/20 13:01 DC 6/15/20 13:11


12 ML


 


 Lidocaine HCl


  (Glydo


  (Lidocaine) Jelly)  1 thomas  1X  ONCE  3/20/20 14:30


 3/20/20 14:31 DC 3/20/20 16:38


1 THOMAS


 


 Lidocaine HCl


  (Lidocaine 1%


 20ml Vial)  20 ml  1X  ONCE  6/7/20 15:00


 6/7/20 15:01 DC 6/7/20 15:30


20 ML


 


 Lidocaine HCl


  (Lidocaine Pf 2%


 Vial)  5 ml  STK-MED ONCE  6/30/20 07:44


 6/30/20 07:44 DC  





 


 Lidocaine HCl


  (Xylocaine-Mpf


 1% 2ml Vial)  2 ml  PRN 1X  PRN  4/27/20 07:00


 4/28/20 06:59 DC  





 


 Linezolid/Dextrose  300 ml @ 


 300 mls/hr  Q12HR  5/17/20 09:00


 5/20/20 08:11 DC 5/19/20 21:08


300 MLS/HR


 


 Lorazepam


  (Ativan Inj)  0.25 mg  PRN Q4HRS  PRN  6/3/20 07:30


    7/25/20 13:06


0.25 MG


 


 Magnesium Sulfate  50 ml @ 25


 mls/hr  1X  ONCE  6/30/20 16:30


 6/30/20 18:29 DC 6/30/20 17:02


25 MLS/HR


 


 Meropenem 1 gm/


 Sodium Chloride  100 ml @ 


 200 mls/hr  Q12HR  6/7/20 21:00


 6/25/20 08:56 DC 6/25/20 08:27


200 MLS/HR


 


 Meropenem 500 mg/


 Sodium Chloride  50 ml @ 


 100 mls/hr  Q6HRS  6/28/20 18:00


 7/21/20 08:23 DC 7/21/20 06:15


100 MLS/HR


 


 Methylprednisolone


 Sodium Succinate


  (SOLU-Medrol


 125MG VIAL)  125 mg  1X  ONCE  6/13/20 06:15


 6/13/20 06:16 DC 6/13/20 06:26


125 MG


 


 Metoclopramide HCl


  (Reglan Vial)  10 mg  PRN Q3HRS  PRN  5/9/20 16:45


    5/14/20 04:25


10 MG


 


 Metoprolol


 Tartrate


  (Lopressor Vial)  5 mg  PRN Q6HRS  PRN  6/10/20 09:00


    7/25/20 09:44


5 MG


 


 Metronidazole  100 ml @ 


 100 mls/hr  Q8HRS  4/14/20 10:00


 4/21/20 08:10 DC 4/21/20 06:04


100 MLS/HR


 


 Micafungin Sodium


 100 mg/Dextrose  100 ml @ 


 100 mls/hr  Q24H  6/30/20 08:30


    7/25/20 07:58


100 MLS/HR


 


 Midazolam HCl


  (Versed)  2 mg  1X  ONCE  6/7/20 15:00


 6/7/20 15:01 DC 6/7/20 15:28


1 MG


 


 Midazolam HCl 100


 mg/Sodium Chloride  100 ml @ 1


 mls/hr  CONT  PRN  6/30/20 14:45


    7/3/20 18:48


10 MLS/HR


 


 Midazolam HCl 50


 mg/Sodium Chloride  50 ml @ 0


 mls/hr  CONT  PRN  3/23/20 08:15


 3/28/20 15:59 DC 3/26/20 22:39


7 MLS/HR


 


 Morphine Sulfate


  (Morphine


 Sulfate)  1 mg  PRN Q1HR  PRN  6/30/20 14:45


    7/25/20 18:28


1 MG


 


 Multi-Ingred


 Cream/Lotion/Oil/


 Oint


  (Artificial


 Tears Eye


 Ointment)  1 thomas  PRN Q1HR  PRN  3/25/20 17:30


 6/3/20 14:39 DC 4/13/20 08:19


1 THOMAS


 


 Naloxone HCl


  (Narcan)  0.4 mg  PRN Q2MIN  PRN  6/30/20 14:45


     





 


 Norepinephrine


 Bitartrate 8 mg/


 Dextrose  258 ml @ 


 13.332 mls/


 hr  CONT  PRN  6/7/20 06:30


    7/2/20 09:09


1.6 MLS/HR


 


 Ondansetron HCl


  (Zofran)  4 mg  STK-MED ONCE  6/30/20 13:33


 6/30/20 13:33 DC  





 


 Pantoprazole


 Sodium


  (PROTONIX VIAL


 for IV PUSH)  40 mg  DAILYAC  3/16/20 11:30


    7/25/20 07:56


40 MG


 


 Phenylephrine HCl


  (Ken-Synephrine


 Inj)  10 mg  STK-MED ONCE  6/30/20 13:33


 6/30/20 13:33 DC  





 


 Phenylephrine HCl


  (PHENYLEPHRINE


 in 0.9% NACL PF)  1 mg  STK-MED ONCE  6/30/20 14:44


 6/30/20 14:45 DC  





 


 Piperacillin Sod/


 Tazobactam Sod


 3.375 gm/Sodium


 Chloride  50 ml @ 


 100 mls/hr  Q6HRS  5/27/20 12:00


 6/4/20 07:26 DC 6/4/20 06:10


100 MLS/HR


 


 Piperacillin Sod/


 Tazobactam Sod


 4.5 gm/Sodium


 Chloride  100 ml @ 


 200 mls/hr  1X  ONCE  3/16/20 06:00


 3/16/20 06:29 DC 3/16/20 05:44


200 MLS/HR


 


 Potassium


 Chloride 110 meq/


 Magnesium Sulfate


 20 meq/


 Multivitamins 10


 ml/Chromium/


 Copper/Manganese/


 Seleni/Zn 1 ml/


 Insulin Human


 Regular 15 unit/


 Total Parenteral


 Nutrition/Amino


 Acids/Dextrose/


 Fat Emulsion


 Intravenous  1,800 ml @ 


 75 mls/hr  TPN  CONT  5/24/20 22:00


 5/25/20 21:59 DC 5/24/20 22:48


75 MLS/HR


 


 Potassium


 Chloride 15 meq/


 Bicarbonate


 Dialysis Soln w/


 out KCl  5,007.5 ml


  @ 1,000 mls/


 hr  Q5H1M  3/29/20 20:00


 4/2/20 13:08 DC 4/1/20 18:14


1,000 MLS/HR


 


 Potassium


 Chloride 20 meq/


 Bicarbonate


 Dialysis Soln w/


 out KCl  5,010 ml @ 


 1,000 mls/hr  Q5H1M  3/25/20 16:00


 3/29/20 19:59 DC 3/29/20 14:54


1,000 MLS/HR


 


 Potassium


 Chloride 40 meq/


 Potassium Acetate


 60 meq/Magnesium


 Sulfate 10 meq/


 Multivitamins 10


 ml/Chromium/


 Copper/Manganese/


 Seleni/Zn 1 ml/


 Insulin Human


 Regular 20 unit/


 Total Parenteral


 Nutrition/Amino


 Acids/Dextrose/


 Fat Emulsion


 Intravenous  1,800 ml @ 


 75 mls/hr  TPN  CONT  6/4/20 22:00


 6/5/20 21:59 DC 6/5/20 00:03


75 MLS/HR


 


 Potassium


 Chloride 70 meq/


 Magnesium Sulfate


 20 meq/


 Multivitamins 10


 ml/Chromium/


 Copper/Manganese/


 Seleni/Zn 1 ml/


 Insulin Human


 Regular 15 unit/


 Total Parenteral


 Nutrition/Amino


 Acids/Dextrose/


 Fat Emulsion


 Intravenous  1,800 ml @ 


 75 mls/hr  TPN  CONT  5/29/20 22:00


 5/30/20 21:59 DC 5/29/20 23:13


75 MLS/HR


 


 Potassium


 Chloride 75 meq/


 Magnesium Sulfate


 15 meq/


 Multivitamins 10


 ml/Chromium/


 Copper/Manganese/


 Seleni/Zn 0.5 ml/


 Insulin Human


 Regular 15 unit/


 Total Parenteral


 Nutrition/Amino


 Acids/Dextrose/


 Fat Emulsion


 Intravenous  1,920 ml @ 


 80 mls/hr  TPN  CONT  5/9/20 22:00


 5/10/20 21:59 DC 5/9/20 22:41


80 MLS/HR


 


 Potassium


 Chloride 75 meq/


 Magnesium Sulfate


 15 meq/Calcium


 Gluconate 8 meq/


 Multivitamins 10


 ml/Chromium/


 Copper/Manganese/


 Seleni/Zn 0.5 ml/


 Insulin Human


 Regular 15 unit/


 Total Parenteral


 Nutrition/Amino


 Acids/Dextrose/


 Fat Emulsion


 Intravenous  1,920 ml @ 


 80 mls/hr  TPN  CONT  5/7/20 22:00


 5/8/20 21:59 DC 5/7/20 22:28


80 MLS/HR


 


 Potassium


 Chloride 75 meq/


 Magnesium Sulfate


 15 meq/Calcium


 Gluconate 8 meq/


 Multivitamins 10


 ml/Chromium/


 Copper/Manganese/


 Seleni/Zn 0.5 ml/


 Insulin Human


 Regular 20 unit/


 Total Parenteral


 Nutrition/Amino


 Acids/Dextrose/


 Fat Emulsion


 Intravenous  1,920 ml @ 


 80 mls/hr  TPN  CONT  5/6/20 22:00


 5/7/20 21:59 DC 5/6/20 22:00


80 MLS/HR


 


 Potassium


 Chloride 75 meq/


 Magnesium Sulfate


 15 meq/Calcium


 Gluconate 8 meq/


 Multivitamins 10


 ml/Chromium/


 Copper/Manganese/


 Seleni/Zn 0.5 ml/


 Insulin Human


 Regular 25 unit/


 Total Parenteral


 Nutrition/Amino


 Acids/Dextrose/


 Fat Emulsion


 Intravenous  1,920 ml @ 


 80 mls/hr  TPN  CONT  5/4/20 22:00


 5/5/20 21:59 DC 5/4/20 23:08


80 MLS/HR


 


 Potassium


 Chloride 75 meq/


 Magnesium Sulfate


 20 meq/Calcium


 Gluconate 10 meq/


 Multivitamins 10


 ml/Chromium/


 Copper/Manganese/


 Seleni/Zn 0.5 ml/


 Insulin Human


 Regular 25 unit/


 Total Parenteral


 Nutrition/Amino


 Acids/Dextrose/


 Fat Emulsion


 Intravenous  1,920 ml @ 


 80 mls/hr  TPN  CONT  5/3/20 22:00


 5/4/20 21:59 DC 5/3/20 22:04


80 MLS/HR


 


 Potassium


 Chloride 75 meq/


 Magnesium Sulfate


 20 meq/Calcium


 Gluconate 10 meq/


 Multivitamins 10


 ml/Chromium/


 Copper/Manganese/


 Seleni/Zn 0.5 ml/


 Insulin Human


 Regular 30 unit/


 Total Parenteral


 Nutrition/Amino


 Acids/Dextrose/


 Fat Emulsion


 Intravenous  1,920 ml @ 


 80 mls/hr  TPN  CONT  5/2/20 22:00


 5/3/20 22:00 DC 5/2/20 21:51


80 MLS/HR


 


 Potassium


 Chloride 80 meq/


 Magnesium Sulfate


 20 meq/


 Multivitamins 10


 ml/Chromium/


 Copper/Manganese/


 Seleni/Zn 0.5 ml/


 Insulin Human


 Regular 15 unit/


 Total Parenteral


 Nutrition/Amino


 Acids/Dextrose/


 Fat Emulsion


 Intravenous  1,920 ml @ 


 80 mls/hr  TPN  CONT  5/12/20 22:00


 5/13/20 21:59 DC 5/12/20 21:40


80 MLS/HR


 


 Potassium


 Chloride 80 meq/


 Magnesium Sulfate


 20 meq/


 Multivitamins 10


 ml/Chromium/


 Copper/Manganese/


 Seleni/Zn 1 ml/


 Insulin Human


 Regular 15 unit/


 Total Parenteral


 Nutrition/Amino


 Acids/Dextrose/


 Fat Emulsion


 Intravenous  1,800 ml @ 


 75 mls/hr  TPN  CONT  5/31/20 22:00


 6/1/20 21:59 DC 5/31/20 21:54


75 MLS/HR


 


 Potassium


 Chloride 90 meq/


 Magnesium Sulfate


 20 meq/


 Multivitamins 10


 ml/Chromium/


 Copper/Manganese/


 Seleni/Zn 1 ml/


 Insulin Human


 Regular 15 unit/


 Total Parenteral


 Nutrition/Amino


 Acids/Dextrose/


 Fat Emulsion


 Intravenous  1,800 ml @ 


 75 mls/hr  TPN  CONT  5/20/20 22:00


 5/21/20 21:59 DC 5/20/20 22:28


75 MLS/HR


 


 Potassium


 Chloride 90 meq/


 Magnesium Sulfate


 20 meq/


 Multivitamins 10


 ml/Chromium/


 Copper/Manganese/


 Seleni/Zn 1 ml/


 Insulin Human


 Regular 20 unit/


 Total Parenteral


 Nutrition/Amino


 Acids/Dextrose/


 Fat Emulsion


 Intravenous  1,800 ml @ 


 75 mls/hr  TPN  CONT  6/3/20 22:00


 6/4/20 21:59 DC 6/3/20 23:13


75 MLS/HR


 


 Potassium


 Chloride/Water  100 ml @ 


 100 mls/hr  Q1H  6/17/20 08:00


 6/17/20 09:59 DC 6/17/20 09:12


100 MLS/HR


 


 Potassium


 Phosphate 20 mmol/


 Sodium Chloride  106.6667


 ml @ 


 51.667 m...  1X  ONCE  3/25/20 13:00


 3/25/20 15:03 DC 3/25/20 12:51


51.667 MLS/HR


 


 Potassium Acetate


 30 meq/Magnesium


 Sulfate 14 meq/


 Multivitamins 10


 ml/Chromium/


 Copper/Manganese/


 Seleni/Zn 1 ml/


 Insulin Human


 Regular 15 unit/


 Sodium Chloride


 20 meq/Potassium


 Chloride 30 meq/


 Total Parenteral


 Nutrition/Amino


 Acids/Dextrose/


 Fat Emulsion


 Intravenous  1,920 ml @ 


 80 mls/hr  TPN  CONT  6/23/20 22:00


 6/24/20 21:59 DC 6/23/20 21:46


80 MLS/HR


 


 Potassium Acetate


 30 meq/Magnesium


 Sulfate 20 meq/


 Calcium Gluconate


 10 meq/


 Multivitamins 10


 ml/Chromium/


 Copper/Manganese/


 Seleni/Zn 0.5 ml/


 Insulin Human


 Regular 30 unit/


 Potassium


 Chloride 30 meq/


 Total Parenteral


 Nutrition/Amino


 Acids/Dextrose/


 Fat Emulsion


 Intravenous  1,920 ml @ 


 80 mls/hr  TPN  CONT  5/1/20 22:00


 5/2/20 21:59 DC 5/1/20 22:34


80 MLS/HR


 


 Potassium Acetate


 40 meq/Magnesium


 Sulfate 10 meq/


 Multivitamins 10


 ml/Chromium/


 Copper/Manganese/


 Seleni/Zn 1 ml/


 Insulin Human


 Regular 20 unit/


 Total Parenteral


 Nutrition/Amino


 Acids/Dextrose/


 Fat Emulsion


 Intravenous  1,920 ml @ 


 80 mls/hr  TPN  CONT  6/16/20 22:00


 6/17/20 21:59 DC 6/16/20 21:32


80 MLS/HR


 


 Potassium Acetate


 40 meq/Magnesium


 Sulfate 5 meq/


 Multivitamins 10


 ml/Chromium/


 Copper/Manganese/


 Seleni/Zn 1 ml/


 Insulin Human


 Regular 30 unit/


 Total Parenteral


 Nutrition/Amino


 Acids/Dextrose/


 Fat Emulsion


 Intravenous  1,920 ml @ 


 80 mls/hr  TPN  CONT  6/15/20 22:00


 6/16/20 19:34 DC 6/15/20 21:54


80 MLS/HR


 


 Potassium Acetate


 55 meq/Magnesium


 Sulfate 20 meq/


 Calcium Gluconate


 10 meq/


 Multivitamins 10


 ml/Chromium/


 Copper/Manganese/


 Seleni/Zn 0.5 ml/


 Insulin Human


 Regular 30 unit/


 Total Parenteral


 Nutrition/Amino


 Acids/Dextrose/


 Fat Emulsion


 Intravenous  1,920 ml @ 


 80 mls/hr  TPN  CONT  4/30/20 22:00


 5/1/20 21:59 DC 5/1/20 01:00


80 MLS/HR


 


 Potassium Acetate


 55 meq/Magnesium


 Sulfate 20 meq/


 Calcium Gluconate


 10 meq/


 Multivitamins 10


 ml/Chromium/


 Copper/Manganese/


 Seleni/Zn 0.5 ml/


 Insulin Human


 Regular 35 unit/


 Total Parenteral


 Nutrition/Amino


 Acids/Dextrose/


 Fat Emulsion


 Intravenous  1,920 ml @ 


 80 mls/hr  TPN  CONT  4/28/20 22:00


 4/29/20 21:59 DC 4/28/20 22:02


80 MLS/HR


 


 Potassium Acetate


 60 meq/Magnesium


 Sulfate 10 meq/


 Multivitamins 10


 ml/Chromium/


 Copper/Manganese/


 Seleni/Zn 1 ml/


 Insulin Human


 Regular 20 unit/


 Total Parenteral


 Nutrition/Amino


 Acids/Dextrose/


 Fat Emulsion


 Intravenous  1,920 ml @ 


 80 mls/hr  TPN  CONT  6/17/20 22:00


 6/18/20 21:59 DC 6/17/20 21:55


80 MLS/HR


 


 Potassium Acetate


 60 meq/Magnesium


 Sulfate 14 meq/


 Multivitamins 10


 ml/Chromium/


 Copper/Manganese/


 Seleni/Zn 1 ml/


 Insulin Human


 Regular 15 unit/


 Sodium Chloride


 20 meq/Total


 Parenteral


 Nutrition/Amino


 Acids/Dextrose/


 Fat Emulsion


 Intravenous  1,920 ml @ 


 80 mls/hr  TPN  CONT  6/22/20 22:00


 6/23/20 21:59 DC 6/22/20 21:54


80 MLS/HR


 


 Potassium Acetate


 60 meq/Magnesium


 Sulfate 14 meq/


 Multivitamins 10


 ml/Chromium/


 Copper/Manganese/


 Seleni/Zn 1 ml/


 Insulin Human


 Regular 15 unit/


 Total Parenteral


 Nutrition/Amino


 Acids/Dextrose/


 Fat Emulsion


 Intravenous  1,920 ml @ 


 80 mls/hr  TPN  CONT  6/21/20 22:00


 6/22/20 21:59 DC 6/21/20 22:22


80 MLS/HR


 


 Potassium Acetate


 60 meq/Magnesium


 Sulfate 14 meq/


 Multivitamins 10


 ml/Chromium/


 Copper/Manganese/


 Seleni/Zn 1 ml/


 Insulin Human


 Regular 20 unit/


 Total Parenteral


 Nutrition/Amino


 Acids/Dextrose/


 Fat Emulsion


 Intravenous  1,920 ml @ 


 80 mls/hr  TPN  CONT  6/18/20 22:00


 6/19/20 21:59 DC 6/18/20 22:26


80 MLS/HR


 


 Potassium Acetate


 60 meq/Magnesium


 Sulfate 5 meq/


 Multivitamins 10


 ml/Chromium/


 Copper/Manganese/


 Seleni/Zn 1 ml/


 Insulin Human


 Regular 30 unit/


 Total Parenteral


 Nutrition/Amino


 Acids/Dextrose/


 Fat Emulsion


 Intravenous  1,920 ml @ 


 80 mls/hr  TPN  CONT  6/6/20 22:00


 6/7/20 21:59 DC 6/6/20 21:54


80 MLS/HR


 


 Potassium Acetate


 65 meq/Magnesium


 Sulfate 20 meq/


 Calcium Gluconate


 10 meq/


 Multivitamins 10


 ml/Chromium/


 Copper/Manganese/


 Seleni/Zn 0.5 ml/


 Insulin Human


 Regular 30 unit/


 Total Parenteral


 Nutrition/Amino


 Acids/Dextrose/


 Fat Emulsion


 Intravenous  1,920 ml @ 


 80 mls/hr  TPN  CONT  4/29/20 22:00


 4/30/20 21:59 DC 4/29/20 22:22


80 MLS/HR


 


 Potassium Acetate


 80 meq/Magnesium


 Sulfate 5 meq/


 Multivitamins 10


 ml/Chromium/


 Copper/Manganese/


 Seleni/Zn 1 ml/


 Insulin Human


 Regular 20 unit/


 Total Parenteral


 Nutrition/Amino


 Acids/Dextrose/


 Fat Emulsion


 Intravenous  1,920 ml @ 


 80 mls/hr  TPN  CONT  6/5/20 22:00


 6/6/20 21:59 DC 6/5/20 21:59


80 MLS/HR


 


 Prochlorperazine


 Edisylate


  (Compazine)  5 mg  PACU PRN  PRN  4/27/20 07:00


 4/28/20 06:59 DC  





 


 Propofol


  (Diprivan)  200 mg  STK-MED ONCE  6/30/20 07:44


 6/30/20 07:44 DC  





 


 Ringer's Solution  1,000 ml @ 


 30 mls/hr  Q24H  4/27/20 07:00


 4/27/20 18:59 DC  





 


 Rocuronium Bromide


  (Zemuron)  100 mg  STK-MED ONCE  6/30/20 07:44


 6/30/20 07:44 DC  





 


 Saliva Substitute


  (Biotene


 Moisturizing


 Mouth)  2 spray  PRN Q15MIN  PRN  5/21/20 11:00


     





 


 Sevoflurane


  (Ultane)  60 ml  STK-MED ONCE  4/27/20 12:26


 4/27/20 12:27 DC  





 


 Sodium


 Bicarbonate 150


 meq/Dextrose  1,150 ml @ 


 75 mls/hr  1X  ONCE  6/30/20 16:30


 7/1/20 07:49 DC 6/30/20 20:02


75 MLS/HR


 


 Sodium


 Bicarbonate 50


 meq/Sodium


 Chloride  1,050 ml @ 


 75 mls/hr  Q14H  3/18/20 07:30


 3/23/20 10:28 DC 3/22/20 21:10


75 MLS/HR


 


 Sodium Acetate 50


 meq/Potassium


 Acetate 55 meq/


 Magnesium Sulfate


 20 meq/Calcium


 Gluconate 10 meq/


 Multivitamins 10


 ml/Chromium/


 Copper/Manganese/


 Seleni/Zn 0.5 ml/


 Insulin Human


 Regular 35 unit/


 Total Parenteral


 Nutrition/Amino


 Acids/Dextrose/


 Fat Emulsion


 Intravenous  1,800 ml @ 


 75 mls/hr  TPN  CONT  4/25/20 22:00


 4/26/20 21:59 DC 4/25/20 22:03


75 MLS/HR


 


 Sodium Bicarbonate


  (Sodium Bicarb


 Adult 8.4% Syr)  100 meq  1X  ONCE  6/30/20 16:30


 6/30/20 16:31 DC 6/30/20 17:07


100 MEQ


 


 Sodium Chloride


  (Normal Saline


 Flush)  3 ml  QSHIFT  PRN  6/30/20 14:45


     





 


 Sodium Chloride


 80 meq/Potassium


 Chloride 30 meq/


 Potassium Acetate


 30 meq/Magnesium


 Sulfate 14 meq/


 Multivitamins 10


 ml/Chromium/


 Copper/Manganese/


 Seleni/Zn 1 ml/


 Insulin Human


 Regular 15 unit/


 Total Parenteral


 Nutrition/Amino


 Acids/Dextrose/


 Fat Emulsion


 Intravenous  1,920 ml @ 


 80 mls/hr  TPN  CONT  7/1/20 22:00


 7/2/20 21:59 DC 7/1/20 23:05


80 MLS/HR


 


 Sodium Chloride


 90 meq/Calcium


 Gluconate 10 meq/


 Multivitamins 10


 ml/Chromium/


 Copper/Manganese/


 Seleni/Zn 0.5 ml/


 Total Parenteral


 Nutrition/Amino


 Acids/Dextrose/


 Fat Emulsion


 Intravenous  1,512 ml @ 


 63 mls/hr  TPN  CONT  3/18/20 22:00


 3/19/20 21:59 DC 3/18/20 22:06


63 MLS/HR


 


 Sodium Chloride


 90 meq/Calcium


 Gluconate 10 meq/


 Multivitamins 10


 ml/Chromium/


 Copper/Manganese/


 Seleni/Zn 1 ml/


 Total Parenteral


 Nutrition/Amino


 Acids/Dextrose/


 Fat Emulsion


 Intravenous  55.005 ml


  @ 2.292


 mls/hr  TPN  CONT  3/18/20 22:00


 3/18/20 12:33 DC  





 


 Sodium Chloride


 90 meq/Magnesium


 Sulfate 10 meq/


 Calcium Gluconate


 20 meq/


 Multivitamins 10


 ml/Chromium/


 Copper/Manganese/


 Seleni/Zn 0.5 ml/


 Total Parenteral


 Nutrition/Amino


 Acids/Dextrose/


 Fat Emulsion


 Intravenous  1,512 ml @ 


 63 mls/hr  TPN  CONT  3/19/20 22:00


 3/20/20 21:59 DC 3/19/20 22:25


63 MLS/HR


 


 Sodium Chloride


 90 meq/Magnesium


 Sulfate 12 meq/


 Calcium Gluconate


 15 meq/


 Multivitamins 10


 ml/Chromium/


 Copper/Manganese/


 Seleni/Zn 0.5 ml/


 Insulin Human


 Regular 25 unit/


 Total Parenteral


 Nutrition/Amino


 Acids/Dextrose/


 Fat Emulsion


 Intravenous  1,400 ml @ 


 58.333 mls/


 hr  TPN  CONT  4/8/20 22:00


 4/9/20 21:59 DC 4/8/20 21:41


58.333 MLS/HR


 


 Sodium Chloride


 90 meq/Potassium


 Chloride 15 meq/


 Magnesium Sulfate


 12 meq/Calcium


 Gluconate 15 meq/


 Multivitamins 10


 ml/Chromium/


 Copper/Manganese/


 Seleni/Zn 0.5 ml/


 Insulin Human


 Regular 25 unit/


 Total Parenteral


 Nutrition/Amino


 Acids/Dextrose/


 Fat Emulsion


 Intravenous  1,400 ml @ 


 58.333 mls/


 hr  TPN  CONT  4/7/20 22:00


 4/8/20 21:59 DC 4/7/20 22:13


58.333 MLS/HR


 


 Sodium Chloride


 90 meq/Potassium


 Chloride 15 meq/


 Potassium


 Phosphate 10 mmol/


 Magnesium Sulfate


 8 meq/Calcium


 Gluconate 15 meq/


 Multivitamins 10


 ml/Chromium/


 Copper/Manganese/


 Seleni/Zn 0.5 ml/


 Insulin Human


 Regular 25 unit/


 Total Parenteral


 Nutrition/Amino


 Acids/Dextrose/


 Fat Emulsion


 Intravenous  1,400 ml @ 


 58.333 mls/


 hr  TPN  CONT  4/5/20 22:00


 4/6/20 21:59 DC 4/5/20 21:20


58.333 MLS/HR


 


 Sodium Chloride


 90 meq/Potassium


 Chloride 15 meq/


 Potassium


 Phosphate 10 mmol/


 Magnesium Sulfate


 10 meq/Calcium


 Gluconate 20 meq/


 Multivitamins 10


 ml/Chromium/


 Copper/Manganese/


 Seleni/Zn 0.5 ml/


 Total Parenteral


 Nutrition/Amino


 Acids/Dextrose/


 Fat Emulsion


 Intravenous  1,400 ml @ 


 58.333 mls/


 hr  TPN  CONT  3/23/20 22:00


 3/24/20 21:59 DC 3/23/20 21:42


58.333 MLS/HR


 


 Sodium Chloride


 90 meq/Potassium


 Chloride 15 meq/


 Potassium


 Phosphate 10 mmol/


 Magnesium Sulfate


 12 meq/Calcium


 Gluconate 15 meq/


 Multivitamins 10


 ml/Chromium/


 Copper/Manganese/


 Seleni/Zn 0.5 ml/


 Insulin Human


 Regular 25 unit/


 Total Parenteral


 Nutrition/Amino


 Acids/Dextrose/


 Fat Emulsion


 Intravenous  1,400 ml @ 


 58.333 mls/


 hr  TPN  CONT  4/6/20 22:00


 4/7/20 21:59 DC 4/6/20 22:24


58.333 MLS/HR


 


 Sodium Chloride


 90 meq/Potassium


 Chloride 15 meq/


 Potassium


 Phosphate 15 mmol/


 Magnesium Sulfate


 10 meq/Calcium


 Gluconate 15 meq/


 Multivitamins 10


 ml/Chromium/


 Copper/Manganese/


 Seleni/Zn 0.5 ml/


 Total Parenteral


 Nutrition/Amino


 Acids/Dextrose/


 Fat Emulsion


 Intravenous  1,400 ml @ 


 58.333 mls/


 hr  TPN  CONT  3/24/20 22:00


 3/25/20 21:59 DC 3/24/20 22:17


58.333 MLS/HR


 


 Sodium Chloride


 90 meq/Potassium


 Chloride 15 meq/


 Potassium


 Phosphate 15 mmol/


 Magnesium Sulfate


 10 meq/Calcium


 Gluconate 20 meq/


 Multivitamins 10


 ml/Chromium/


 Copper/Manganese/


 Seleni/Zn 0.5 ml/


 Total Parenteral


 Nutrition/Amino


 Acids/Dextrose/


 Fat Emulsion


 Intravenous  1,200 ml @ 


 50 mls/hr  TPN  CONT  3/22/20 22:00


 3/22/20 14:17 DC  





 


 Sodium Chloride


 90 meq/Potassium


 Chloride 15 meq/


 Potassium


 Phosphate 18 mmol/


 Magnesium Sulfate


 8 meq/Calcium


 Gluconate 15 meq/


 Multivitamins 10


 ml/Chromium/


 Copper/Manganese/


 Seleni/Zn 0.5 ml/


 Insulin Human


 Regular 10 unit/


 Total Parenteral


 Nutrition/Amino


 Acids/Dextrose/


 Fat Emulsion


 Intravenous  1,400 ml @ 


 58.333 mls/


 hr  TPN  CONT  3/27/20 22:00


 3/28/20 21:59 DC 3/27/20 21:43


58.333 MLS/HR


 


 Sodium Chloride


 90 meq/Potassium


 Chloride 15 meq/


 Potassium


 Phosphate 18 mmol/


 Magnesium Sulfate


 8 meq/Calcium


 Gluconate 15 meq/


 Multivitamins 10


 ml/Chromium/


 Copper/Manganese/


 Seleni/Zn 0.5 ml/


 Insulin Human


 Regular 15 unit/


 Total Parenteral


 Nutrition/Amino


 Acids/Dextrose/


 Fat Emulsion


 Intravenous  1,400 ml @ 


 58.333 mls/


 hr  TPN  CONT  3/30/20 22:00


 3/31/20 21:59 DC 3/30/20 21:47


58.333 MLS/HR


 


 Sodium Chloride


 90 meq/Potassium


 Chloride 15 meq/


 Potassium


 Phosphate 18 mmol/


 Magnesium Sulfate


 8 meq/Calcium


 Gluconate 15 meq/


 Multivitamins 10


 ml/Chromium/


 Copper/Manganese/


 Seleni/Zn 0.5 ml/


 Insulin Human


 Regular 20 unit/


 Total Parenteral


 Nutrition/Amino


 Acids/Dextrose/


 Fat Emulsion


 Intravenous  1,400 ml @ 


 58.333 mls/


 hr  TPN  CONT  4/2/20 22:00


 4/3/20 21:59 DC 4/2/20 22:45


58.333 MLS/HR


 


 Sodium Chloride


 90 meq/Potassium


 Chloride 15 meq/


 Potassium


 Phosphate 18 mmol/


 Magnesium Sulfate


 8 meq/Calcium


 Gluconate 15 meq/


 Multivitamins 10


 ml/Chromium/


 Copper/Manganese/


 Seleni/Zn 0.5 ml/


 Total Parenteral


 Nutrition/Amino


 Acids/Dextrose/


 Fat Emulsion


 Intravenous  1,400 ml @ 


 58.333 mls/


 hr  TPN  CONT  3/26/20 22:00


 3/27/20 21:59 DC 3/26/20 22:00


58.333 MLS/HR


 


 Sodium Chloride


 90 meq/Potassium


 Chloride 30 meq/


 Potassium Acetate


 30 meq/Magnesium


 Sulfate 15 meq/


 Multivitamins 10


 ml/Chromium/


 Copper/Manganese/


 Seleni/Zn 1 ml/


 Insulin Human


 Regular 15 unit/


 Total Parenteral


 Nutrition/Amino


 Acids/Dextrose/


 Fat Emulsion


 Intravenous  1,680 ml @ 


 70 mls/hr  TPN  CONT  7/16/20 22:00


 7/17/20 21:59 DC 7/16/20 22:06


70 MLS/HR


 


 Sodium Chloride


 90 meq/Potassium


 Phosphate 15 mmol/


 Magnesium Sulfate


 12 meq/Calcium


 Gluconate 15 meq/


 Multivitamins 10


 ml/Chromium/


 Copper/Manganese/


 Seleni/Zn 0.5 ml/


 Insulin Human


 Regular 30 unit/


 Total Parenteral


 Nutrition/Amino


 Acids/Dextrose/


 Fat Emulsion


 Intravenous  1,400 ml @ 


 58.333 mls/


 hr  TPN  CONT  4/10/20 22:00


 4/11/20 21:59 DC 4/10/20 21:49


58.333 MLS/HR


 


 Sodium Chloride


 90 meq/Potassium


 Phosphate 15 mmol/


 Magnesium Sulfate


 12 meq/Calcium


 Gluconate 15 meq/


 Multivitamins 10


 ml/Chromium/


 Copper/Manganese/


 Seleni/Zn 0.5 ml/


 Insulin Human


 Regular 40 unit/


 Total Parenteral


 Nutrition/Amino


 Acids/Dextrose/


 Fat Emulsion


 Intravenous  1,400 ml @ 


 58.333 mls/


 hr  TPN  CONT  4/11/20 22:00


 4/12/20 21:59 DC 4/11/20 21:21


58.333 MLS/HR


 


 Sodium Chloride


 90 meq/Potassium


 Phosphate 19 mmol/


 Magnesium Sulfate


 12 meq/Calcium


 Gluconate 15 meq/


 Multivitamins 10


 ml/Chromium/


 Copper/Manganese/


 Seleni/Zn 0.5 ml/


 Insulin Human


 Regular 40 unit/


 Total Parenteral


 Nutrition/Amino


 Acids/Dextrose/


 Fat Emulsion


 Intravenous  1,400 ml @ 


 58.333 mls/


 hr  TPN  CONT  4/12/20 22:00


 4/13/20 21:59 DC 4/12/20 21:54


58.333 MLS/HR


 


 Sodium Chloride


 90 meq/Potassium


 Phosphate 5 mmol/


 Magnesium Sulfate


 12 meq/Calcium


 Gluconate 15 meq/


 Multivitamins 10


 ml/Chromium/


 Copper/Manganese/


 Seleni/Zn 0.5 ml/


 Insulin Human


 Regular 30 unit/


 Total Parenteral


 Nutrition/Amino


 Acids/Dextrose/


 Fat Emulsion


 Intravenous  1,400 ml @ 


 58.333 mls/


 hr  TPN  CONT  4/9/20 22:00


 4/10/20 21:59 DC 4/9/20 22:08


58.333 MLS/HR


 


 Sodium Chloride


 100 meq/Potassium


 Chloride 30 meq/


 Potassium Acetate


 30 meq/Magnesium


 Sulfate 12 meq/


 Multivitamins 10


 ml/Chromium/


 Copper/Manganese/


 Seleni/Zn 1 ml/


 Insulin Human


 Regular 15 unit/


 Total Parenteral


 Nutrition/Amino


 Acids/Dextrose/


 Fat Emulsion


 Intravenous  1,680 ml @ 


 70 mls/hr  TPN  CONT  7/5/20 22:00


 7/6/20 21:59 DC 7/5/20 21:23


70 MLS/HR


 


 Sodium Chloride


 100 meq/Potassium


 Chloride 40 meq/


 Magnesium Sulfate


 15 meq/Calcium


 Gluconate 15 meq/


 Multivitamins 10


 ml/Chromium/


 Copper/Manganese/


 Seleni/Zn 0.5 ml/


 Insulin Human


 Regular 35 unit/


 Total Parenteral


 Nutrition/Amino


 Acids/Dextrose/


 Fat Emulsion


 Intravenous  1,400 ml @ 


 58.333 mls/


 hr  TPN  CONT  4/19/20 22:00


 4/20/20 21:59 DC 4/19/20 22:46


58.333 MLS/HR


 


 Sodium Chloride


 100 meq/Potassium


 Chloride 40 meq/


 Magnesium Sulfate


 20 meq/Calcium


 Gluconate 10 meq/


 Multivitamins 10


 ml/Chromium/


 Copper/Manganese/


 Seleni/Zn 0.5 ml/


 Insulin Human


 Regular 35 unit/


 Total Parenteral


 Nutrition/Amino


 Acids/Dextrose/


 Fat Emulsion


 Intravenous  1,400 ml @ 


 58.333 mls/


 hr  TPN  CONT  4/23/20 22:00


 4/24/20 21:59 DC 4/24/20 00:06


58.333 MLS/HR


 


 Sodium Chloride


 100 meq/Potassium


 Chloride 40 meq/


 Magnesium Sulfate


 20 meq/Calcium


 Gluconate 15 meq/


 Multivitamins 10


 ml/Chromium/


 Copper/Manganese/


 Seleni/Zn 0.5 ml/


 Insulin Human


 Regular 35 unit/


 Total Parenteral


 Nutrition/Amino


 Acids/Dextrose/


 Fat Emulsion


 Intravenous  1,400 ml @ 


 58.333 mls/


 hr  TPN  CONT  4/22/20 22:00


 4/23/20 21:59 DC 4/22/20 22:27


58.333 MLS/HR


 


 Sodium Chloride


 100 meq/Potassium


 Phosphate 10 mmol/


 Magnesium Sulfate


 12 meq/Calcium


 Gluconate 15 meq/


 Multivitamins 10


 ml/Chromium/


 Copper/Manganese/


 Seleni/Zn 0.5 ml/


 Insulin Human


 Regular 35 unit/


 Potassium


 Chloride 20 meq/


 Total Parenteral


 Nutrition/Amino


 Acids/Dextrose/


 Fat Emulsion


 Intravenous  1,400 ml @ 


 58.333 mls/


 hr  TPN  CONT  4/16/20 22:00


 4/17/20 21:59 DC 4/16/20 22:10


58.333 MLS/HR


 


 Sodium Chloride


 100 meq/Potassium


 Phosphate 19 mmol/


 Magnesium Sulfate


 12 meq/Calcium


 Gluconate 15 meq/


 Multivitamins 10


 ml/Chromium/


 Copper/Manganese/


 Seleni/Zn 0.5 ml/


 Insulin Human


 Regular 40 unit/


 Potassium


 Chloride 20 meq/


 Total Parenteral


 Nutrition/Amino


 Acids/Dextrose/


 Fat Emulsion


 Intravenous  1,400 ml @ 


 58.333 mls/


 hr  TPN  CONT  4/15/20 22:00


 4/16/20 21:59 DC 4/15/20 21:20


58.333 MLS/HR


 


 Sodium Chloride


 100 meq/Potassium


 Phosphate 5 mmol/


 Magnesium Sulfate


 12 meq/Calcium


 Gluconate 15 meq/


 Multivitamins 10


 ml/Chromium/


 Copper/Manganese/


 Seleni/Zn 0.5 ml/


 Insulin Human


 Regular 35 unit/


 Potassium


 Chloride 20 meq/


 Total Parenteral


 Nutrition/Amino


 Acids/Dextrose/


 Fat Emulsion


 Intravenous  1,400 ml @ 


 58.333 mls/


 hr  TPN  CONT  4/17/20 22:00


 4/18/20 21:59 DC 4/17/20 22:59


58.333 MLS/HR


 


 Sodium Chloride


 110 meq/Potassium


 Chloride 30 meq/


 Potassium Acetate


 30 meq/Magnesium


 Sulfate 15 meq/


 Multivitamins 10


 ml/Chromium/


 Copper/Manganese/


 Seleni/Zn 1 ml/


 Insulin Human


 Regular 15 unit/


 Total Parenteral


 Nutrition/Amino


 Acids/Dextrose/


 Fat Emulsion


 Intravenous  1,680 ml @ 


 70 mls/hr  TPN  CONT  7/18/20 22:00


 7/19/20 21:59 DC 7/18/20 22:01


70 MLS/HR


 


 Sodium Chloride


 110 meq/Sodium


 Phosphate 10 mmol/


 Potassium


 Chloride 30 meq/


 Potassium Acetate


 30 meq/Magnesium


 Sulfate 15 meq/


 Multivitamins 10


 ml/Chromium/


 Copper/Manganese/


 Seleni/Zn 1 ml/


 Insulin Human


 Regular 15 unit/


 Total Parenteral


 Nutrition/Amino


 Acids/Dextrose/


 Fat Emulsion


 Intravenous  1,680 ml @ 


 70 mls/hr  TPN  CONT  7/19/20 22:00


 7/20/20 21:59 DC 7/19/20 22:03


70 MLS/HR


 


 Sodium Chloride


 120 meq/Sodium


 Phosphate 10 mmol/


 Potassium


 Chloride 30 meq/


 Potassium Acetate


 30 meq/Magnesium


 Sulfate 15 meq/


 Multivitamins 10


 ml/Chromium/


 Copper/Manganese/


 Seleni/Zn 1 ml/


 Insulin Human


 Regular 15 unit/


 Total Parenteral


 Nutrition/Amino


 Acids/Dextrose/


 Fat Emulsion


 Intravenous  1,680 ml @ 


 70 mls/hr  TPN  CONT  7/25/20 22:00


 7/26/20 21:59  7/25/20 22:14


70 MLS/HR


 


 Succinylcholine


 Chloride


  (Anectine)  120 mg  1X  ONCE  3/23/20 08:30


 3/23/20 08:31 DC 3/23/20 08:34


120 MG


 


 Vancomycin HCl


  (Vanco Per


 Pharmacy)  1 each  PRN DAILY  PRN  7/12/20 09:15


 7/15/20 07:41 DC 7/14/20 02:46


1 EACH


 


 Vancomycin HCl


  (Vancomycin


 Random Level)  1 each  1X  ONCE  7/14/20 01:00


 7/14/20 01:01 DC 7/14/20 01:00


1 EACH


 


 Vancomycin HCl


  (Vancomycin


 Trough Level)  1 each  1X  ONCE  7/15/20 09:30


 7/15/20 09:31 Cancel  





 


 Vancomycin HCl


 1.5 gm/Sodium


 Chloride  500 ml @ 


 250 mls/hr  Q12H  7/14/20 10:00


 7/15/20 07:41 DC 7/14/20 22:07


250 MLS/HR


 


 Vancomycin HCl 2


 gm/Sodium Chloride  500 ml @ 


 250 mls/hr  1X  ONCE  7/12/20 10:00


 7/12/20 11:59 DC 7/12/20 10:34


250 MLS/HR


 


 Vasopressin


  (Vasostrict)  20 unit  STK-MED ONCE  6/30/20 12:23


 6/30/20 12:23 DC  





 


 Vasopressin 20


 unit/Dextrose  101 ml @ 


 12 mls/hr  CONT  PRN  6/30/20 15:30


    7/7/20 04:17


12 MLS/HR


 


 Vecuronium Bromide


  (Norcuron Bolus)  6 mg  PRN Q6HRS  PRN  5/7/20 19:15


 5/7/20 19:35 DC  














Labs:


Lab





Laboratory Tests








Test


 7/25/20


11:36 7/25/20


17:48 7/25/20


23:49 7/26/20


05:55


 


Glucose (Fingerstick)


 145 mg/dL


(70-99) 132 mg/dL


(70-99) 139 mg/dL


(70-99) 





 


White Blood Count


 


 


 


 14.7 x10^3/uL


(4.0-11.0)


 


Red Blood Count


 


 


 


 2.78 x10^6/uL


(3.50-5.40)


 


Hemoglobin


 


 


 


 7.7 g/dL


(12.0-15.5)


 


Hematocrit


 


 


 


 23.8 %


(36.0-47.0)


 


Mean Corpuscular Volume    86 fL () 


 


Mean Corpuscular Hemoglobin    28 pg (25-35) 


 


Mean Corpuscular Hemoglobin


Concent 


 


 


 33 g/dL


(31-37)


 


Red Cell Distribution Width


 


 


 


 16.0 %


(11.5-14.5)


 


Platelet Count


 


 


 


 563 x10^3/uL


(140-400)


 


Neutrophils (%) (Auto)    83 % (31-73) 


 


Lymphocytes (%) (Auto)    8 % (24-48) 


 


Monocytes (%) (Auto)    7 % (0-9) 


 


Eosinophils (%) (Auto)    3 % (0-3) 


 


Basophils (%) (Auto)    0 % (0-3) 


 


Neutrophils # (Auto)


 


 


 


 12.2 x10^3/uL


(1.8-7.7)


 


Lymphocytes # (Auto)


 


 


 


 1.2 x10^3/uL


(1.0-4.8)


 


Monocytes # (Auto)


 


 


 


 1.0 x10^3/uL


(0.0-1.1)


 


Eosinophils # (Auto)


 


 


 


 0.4 x10^3/uL


(0.0-0.7)


 


Basophils # (Auto)


 


 


 


 0.0 x10^3/uL


(0.0-0.2)


 


Test


 7/26/20


06:00 


 


 





 


Glucose (Fingerstick)


 120 mg/dL


(70-99) 


 


 











Micro


        NEG ANSON 56


        PSEUDOMONAS AERUGINOSA


        ANTIBIOTIC                        RESULT          INTERPRETATION


        AMIKACIN                          <=16                  S


        AZTREONAM                         >16                   R


        CEFTAZIDIME                       >16                   R


        CIPROFLOXACIN                     <=0.25                S


        CEFEPIME                          16                    I


        GENTAMICIN                        <=2                   S


        LEVOFLOXACIN                      <=0.5                 S














                               ** CONTINUED ON NEXT PAGE **





 

--------------------------------------------------------------------------------


------------





RUN DATE: 06/10/20                  [facility] *LIVE*               

  PAGE 2   


RUN TIME: 1121                            Specimen Inquiry                    


 

--------------------------------------------------------------------------------


------------





SPEC: [id number]    PATIENT: JESENIA BEAN                [id number]  

(Continued)


-------------------------------------------------

-------------------------------------------








 

--------------------------------------------------------------------------------


------------





  Procedure                         Result                                      

         


-------------------------

-------------------------------------------------------------------





  ANTIMICROBIAL SUSCEPTIBILITY  Preliminary   (continued)


        MEROPENEM                         <=1                   S


        PIPERACILLIN/TAZOBACTAM           64                    S


        TOBRAMYCIN                        <=2                   S


        Unless otherwise specified, Testing Performed by:


        [address] [address]


        For Inquires, the Physician may contact the Microbiology


        department at [phone]





--------------------------------------------------------------

------------------------------





Objective:


Assessment:


Patient with prolonged hospitalization more than 4 months


Multiple medical problems


Multiple surgical procedures





S/P Exp. 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 

parapsilosis & PSAE (I-merrem/ceftazidime/AZT/cefepime))


Leucocytosis -trending upward 


Fever 


Acute gallstone pancreatitis with persistent necrosis


  - 4/9.  CT A/P Increased ascites. Persistent evidence of necrotizing 

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


  - 4/27. status post ROBERT drain placement; C. parapsilosis. s/p drain 5/6 + yeast

& high amylase; s/p additional drain on 5/8. Drains removed. 


  -5/6. fluid  candida parapsilosis fluid, amylase high


  - 6/6 showed multiple pseudocysts, slight larger on the right. s/p drains x 3,

6/7.  + PSAE (MDRO-R Cefepime, Zosyn ANSON < 64) and yeast, 


  -6/7 s/p drain replacement x 3; fluid cult PSAE (MDRO), yeast; treated


  -7/12 CT A/P shows smaller fluid collections.  


  -722 CT abdomen and pelvis drains in place       


Ascites s/p paracentesis 4/15 & 5/6. C. parapsilosis 


Cholelithiasis with thickening of the gallbladder wall.


JED, Hyperkalemia, Metabolic acidosis off dialysis


Acute hypoxic resp failure. trach/vent. sputum 6/13  + PSAE (I merrem) ; sputum 

culture July 19+ for PSAE R Merrem, sensitive to cefepime


Pleural effusion status post CTS left side


 Abdominal fluid culture 6 /7 MDRO Pseudomonas, yeast


Sputum culture positive 7/19 for MDRO Pseudomonas


Chest tube fluid positive for 7/21 Candida Parapsilosis








Generalized debility





Plan:


Plan of Care


Continue Avycaz /micafungin


Restart daptomycin


UA and urine culture


Blood culture


DC PICC line 


BC from 7 /21  neg to date 


C. difficile negative


Monitor WBC/temp 


Follow cultures


Wound care /drain management as directed


Contact isolation for CRE/MDRO








Critically ill





Long term prognosis  poor





D/w nursing











IVAN FRANZ MD           Jul 26, 2020 06:56

## 2020-07-27 VITALS — SYSTOLIC BLOOD PRESSURE: 117 MMHG | DIASTOLIC BLOOD PRESSURE: 76 MMHG

## 2020-07-27 VITALS — DIASTOLIC BLOOD PRESSURE: 62 MMHG | SYSTOLIC BLOOD PRESSURE: 113 MMHG

## 2020-07-27 VITALS — DIASTOLIC BLOOD PRESSURE: 72 MMHG | SYSTOLIC BLOOD PRESSURE: 118 MMHG

## 2020-07-27 VITALS — DIASTOLIC BLOOD PRESSURE: 73 MMHG | SYSTOLIC BLOOD PRESSURE: 130 MMHG

## 2020-07-27 VITALS — DIASTOLIC BLOOD PRESSURE: 81 MMHG | SYSTOLIC BLOOD PRESSURE: 115 MMHG

## 2020-07-27 VITALS — DIASTOLIC BLOOD PRESSURE: 87 MMHG | SYSTOLIC BLOOD PRESSURE: 146 MMHG

## 2020-07-27 VITALS — DIASTOLIC BLOOD PRESSURE: 84 MMHG | SYSTOLIC BLOOD PRESSURE: 124 MMHG

## 2020-07-27 LAB
ALBUMIN SERPL-MCNC: 1.1 G/DL (ref 3.4–5)
ALBUMIN/GLOB SERPL: 0.3 {RATIO} (ref 1–1.7)
ALP SERPL-CCNC: 123 U/L (ref 46–116)
ALT SERPL-CCNC: 13 U/L (ref 14–59)
ANION GAP SERPL CALC-SCNC: 7 MMOL/L (ref 6–14)
AST SERPL-CCNC: 18 U/L (ref 15–37)
BASOPHILS # BLD AUTO: 0.1 X10^3/UL (ref 0–0.2)
BASOPHILS NFR BLD: 1 % (ref 0–3)
BILIRUB SERPL-MCNC: 0.5 MG/DL (ref 0.2–1)
BUN SERPL-MCNC: 16 MG/DL (ref 7–20)
BUN/CREAT SERPL: 23 (ref 6–20)
CALCIUM SERPL-MCNC: 10.9 MG/DL (ref 8.5–10.1)
CHLORIDE SERPL-SCNC: 103 MMOL/L (ref 98–107)
CO2 SERPL-SCNC: 25 MMOL/L (ref 21–32)
CREAT SERPL-MCNC: 0.7 MG/DL (ref 0.6–1)
EOSINOPHIL NFR BLD: 0.3 X10^3/UL (ref 0–0.7)
EOSINOPHIL NFR BLD: 2 % (ref 0–3)
ERYTHROCYTE [DISTWIDTH] IN BLOOD BY AUTOMATED COUNT: 16 % (ref 11.5–14.5)
GFR SERPLBLD BASED ON 1.73 SQ M-ARVRAT: 88.9 ML/MIN
GLOBULIN SER-MCNC: 4.3 G/DL (ref 2.2–3.8)
GLUCOSE SERPL-MCNC: 114 MG/DL (ref 70–99)
HCT VFR BLD CALC: 23.2 % (ref 36–47)
HGB BLD-MCNC: 7.7 G/DL (ref 12–15.5)
LYMPHOCYTES # BLD: 1.4 X10^3/UL (ref 1–4.8)
LYMPHOCYTES NFR BLD AUTO: 12 % (ref 24–48)
MCH RBC QN AUTO: 28 PG (ref 25–35)
MCHC RBC AUTO-ENTMCNC: 33 G/DL (ref 31–37)
MCV RBC AUTO: 85 FL (ref 79–100)
MONO #: 0.9 X10^3/UL (ref 0–1.1)
MONOCYTES NFR BLD: 8 % (ref 0–9)
NEUT #: 9.4 X10^3/UL (ref 1.8–7.7)
NEUTROPHILS NFR BLD AUTO: 78 % (ref 31–73)
PLATELET # BLD AUTO: 563 X10^3/UL (ref 140–400)
POTASSIUM SERPL-SCNC: 4.6 MMOL/L (ref 3.5–5.1)
PROT SERPL-MCNC: 5.4 G/DL (ref 6.4–8.2)
RBC # BLD AUTO: 2.71 X10^6/UL (ref 3.5–5.4)
SODIUM SERPL-SCNC: 135 MMOL/L (ref 136–145)
WBC # BLD AUTO: 12.1 X10^3/UL (ref 4–11)

## 2020-07-27 RX ADMIN — IPRATROPIUM BROMIDE AND ALBUTEROL SULFATE SCH ML: .5; 3 SOLUTION RESPIRATORY (INHALATION) at 00:19

## 2020-07-27 RX ADMIN — INSULIN LISPRO SCH UNITS: 100 INJECTION, SOLUTION INTRAVENOUS; SUBCUTANEOUS at 00:00

## 2020-07-27 RX ADMIN — ACETYLCYSTEINE SCH MG: 200 INHALANT RESPIRATORY (INHALATION) at 19:43

## 2020-07-27 RX ADMIN — DAPTOMYCIN SCH MLS/HR: 500 INJECTION, POWDER, LYOPHILIZED, FOR SOLUTION INTRAVENOUS at 09:48

## 2020-07-27 RX ADMIN — IPRATROPIUM BROMIDE AND ALBUTEROL SULFATE SCH ML: .5; 3 SOLUTION RESPIRATORY (INHALATION) at 12:08

## 2020-07-27 RX ADMIN — GLYCERIN, ISOLEUCINE, LEUCINE, LYSINE, METHIONINE, PHENYLALANINE, THREONINE, TRYPTOPHAN, VALINE, ALANINE, GLYCINE, ARGININE, HISTIDINE, PROLINE, SERINE, CYSTEINE, SODIUM ACETATE, MAGNESIUM ACETATE, CALCIUM ACETATE, SODIUM CHLORIDE, POTASSIUM CHLORIDE, PHOSPHORIC ACID, AND POTASSIUM METABISULFITE SCH MLS/HR
3; .21; .27; .22; .16; .17; .12; .046; .2; .21; .42; .29; .085; .34; .18; .014; .2; .054; .026; .12; .15; .041 INJECTION INTRAVENOUS at 04:49

## 2020-07-27 RX ADMIN — INSULIN LISPRO SCH UNITS: 100 INJECTION, SOLUTION INTRAVENOUS; SUBCUTANEOUS at 06:00

## 2020-07-27 RX ADMIN — ACETYLCYSTEINE SCH MG: 200 INHALANT RESPIRATORY (INHALATION) at 08:00

## 2020-07-27 RX ADMIN — FENTANYL CITRATE PRN MCG: 50 INJECTION INTRAMUSCULAR; INTRAVENOUS at 11:39

## 2020-07-27 RX ADMIN — CEFTAZIDIME, AVIBACTAM SCH MLS/HR: 2; .5 POWDER, FOR SOLUTION INTRAVENOUS at 14:21

## 2020-07-27 RX ADMIN — ONDANSETRON PRN MG: 2 INJECTION INTRAMUSCULAR; INTRAVENOUS at 00:56

## 2020-07-27 RX ADMIN — IPRATROPIUM BROMIDE AND ALBUTEROL SULFATE SCH ML: .5; 3 SOLUTION RESPIRATORY (INHALATION) at 15:59

## 2020-07-27 RX ADMIN — BACITRACIN SCH MLS/HR: 5000 INJECTION, POWDER, FOR SOLUTION INTRAMUSCULAR at 14:33

## 2020-07-27 RX ADMIN — FENTANYL CITRATE PRN MCG: 50 INJECTION INTRAMUSCULAR; INTRAVENOUS at 03:27

## 2020-07-27 RX ADMIN — GLYCERIN, ISOLEUCINE, LEUCINE, LYSINE, METHIONINE, PHENYLALANINE, THREONINE, TRYPTOPHAN, VALINE, ALANINE, GLYCINE, ARGININE, HISTIDINE, PROLINE, SERINE, CYSTEINE, SODIUM ACETATE, MAGNESIUM ACETATE, CALCIUM ACETATE, SODIUM CHLORIDE, POTASSIUM CHLORIDE, PHOSPHORIC ACID, AND POTASSIUM METABISULFITE SCH MLS/HR
3; .21; .27; .22; .16; .17; .12; .046; .2; .21; .42; .29; .085; .34; .18; .014; .2; .054; .026; .12; .15; .041 INJECTION INTRAVENOUS at 11:15

## 2020-07-27 RX ADMIN — IPRATROPIUM BROMIDE AND ALBUTEROL SULFATE SCH ML: .5; 3 SOLUTION RESPIRATORY (INHALATION) at 19:43

## 2020-07-27 RX ADMIN — IPRATROPIUM BROMIDE AND ALBUTEROL SULFATE SCH ML: .5; 3 SOLUTION RESPIRATORY (INHALATION) at 23:55

## 2020-07-27 RX ADMIN — PANTOPRAZOLE SODIUM SCH MG: 40 INJECTION, POWDER, FOR SOLUTION INTRAVENOUS at 09:47

## 2020-07-27 RX ADMIN — PROCHLORPERAZINE EDISYLATE PRN MG: 5 INJECTION INTRAMUSCULAR; INTRAVENOUS at 09:49

## 2020-07-27 RX ADMIN — INSULIN LISPRO SCH UNITS: 100 INJECTION, SOLUTION INTRAVENOUS; SUBCUTANEOUS at 12:00

## 2020-07-27 RX ADMIN — ENOXAPARIN SODIUM SCH MG: 40 INJECTION SUBCUTANEOUS at 09:47

## 2020-07-27 RX ADMIN — INSULIN LISPRO SCH UNITS: 100 INJECTION, SOLUTION INTRAVENOUS; SUBCUTANEOUS at 17:48

## 2020-07-27 RX ADMIN — IPRATROPIUM BROMIDE AND ALBUTEROL SULFATE SCH ML: .5; 3 SOLUTION RESPIRATORY (INHALATION) at 04:45

## 2020-07-27 RX ADMIN — CEFTAZIDIME, AVIBACTAM SCH MLS/HR: 2; .5 POWDER, FOR SOLUTION INTRAVENOUS at 22:26

## 2020-07-27 RX ADMIN — DEXTROSE SCH MLS/HR: 50 INJECTION, SOLUTION INTRAVENOUS at 09:48

## 2020-07-27 RX ADMIN — CEFTAZIDIME, AVIBACTAM SCH MLS/HR: 2; .5 POWDER, FOR SOLUTION INTRAVENOUS at 05:47

## 2020-07-27 RX ADMIN — IPRATROPIUM BROMIDE AND ALBUTEROL SULFATE SCH ML: .5; 3 SOLUTION RESPIRATORY (INHALATION) at 08:19

## 2020-07-27 RX ADMIN — FENTANYL CITRATE PRN MCG: 50 INJECTION INTRAMUSCULAR; INTRAVENOUS at 18:16

## 2020-07-27 NOTE — PDOC
PULMONARY PROGRESS NOTES


Subjective


Patient with no distress


on, canula


Vitals





Vital Signs








  Date Time  Temp Pulse Resp B/P (MAP) Pulse Ox O2 Delivery O2 Flow Rate FiO2


 


7/27/20 11:00  130 49  98 Nasal Cannula 2.0 


 


7/27/20 07:00 98.1       





 98.1       








ROS:  No Nausea, No Chest Pain, No Increase Cough


General:  Alert


HEENT:  Other (trach site ok)


Lungs:  Crackles


Cardiovascular:  S1, S2


Abdomen:  Soft, Non-tender, Other (multiple ROBERT drains )


Neuro Exam:  Alert


Extremities:  Other (+1 BLE edema)


Skin:  Warm


Labs





Laboratory Tests








Test


 7/25/20


17:48 7/25/20


23:49 7/26/20


05:55 7/26/20


06:00


 


Glucose (Fingerstick)


 132 mg/dL


(70-99) 139 mg/dL


(70-99) 


 120 mg/dL


(70-99)


 


White Blood Count


 


 


 14.7 x10^3/uL


(4.0-11.0) 





 


Red Blood Count


 


 


 2.78 x10^6/uL


(3.50-5.40) 





 


Hemoglobin


 


 


 7.7 g/dL


(12.0-15.5) 





 


Hematocrit


 


 


 23.8 %


(36.0-47.0) 





 


Mean Corpuscular Volume   86 fL ()  


 


Mean Corpuscular Hemoglobin   28 pg (25-35)  


 


Mean Corpuscular Hemoglobin


Concent 


 


 33 g/dL


(31-37) 





 


Red Cell Distribution Width


 


 


 16.0 %


(11.5-14.5) 





 


Platelet Count


 


 


 563 x10^3/uL


(140-400) 





 


Neutrophils (%) (Auto)   83 % (31-73)  


 


Lymphocytes (%) (Auto)   8 % (24-48)  


 


Monocytes (%) (Auto)   7 % (0-9)  


 


Eosinophils (%) (Auto)   3 % (0-3)  


 


Basophils (%) (Auto)   0 % (0-3)  


 


Neutrophils # (Auto)


 


 


 12.2 x10^3/uL


(1.8-7.7) 





 


Lymphocytes # (Auto)


 


 


 1.2 x10^3/uL


(1.0-4.8) 





 


Monocytes # (Auto)


 


 


 1.0 x10^3/uL


(0.0-1.1) 





 


Eosinophils # (Auto)


 


 


 0.4 x10^3/uL


(0.0-0.7) 





 


Basophils # (Auto)


 


 


 0.0 x10^3/uL


(0.0-0.2) 





 


Sodium Level


 


 


 138 mmol/L


(136-145) 





 


Potassium Level


 


 


 4.4 mmol/L


(3.5-5.1) 





 


Chloride Level


 


 


 106 mmol/L


() 





 


Carbon Dioxide Level


 


 


 29 mmol/L


(21-32) 





 


Anion Gap   3 (6-14)  


 


Blood Urea Nitrogen


 


 


 15 mg/dL


(7-20) 





 


Creatinine


 


 


 0.5 mg/dL


(0.6-1.0) 





 


Estimated GFR


(Cockcroft-Gault) 


 


 131.1 


 





 


BUN/Creatinine Ratio   30 (6-20)  


 


Glucose Level


 


 


 123 mg/dL


(70-99) 





 


Calcium Level


 


 


 10.3 mg/dL


(8.5-10.1) 





 


Total Bilirubin


 


 


 0.4 mg/dL


(0.2-1.0) 





 


Aspartate Amino Transf


(AST/SGOT) 


 


 14 U/L (15-37) 


 





 


Alanine Aminotransferase


(ALT/SGPT) 


 


 12 U/L (14-59) 


 





 


Alkaline Phosphatase


 


 


 119 U/L


() 





 


Total Protein


 


 


 5.9 g/dL


(6.4-8.2) 





 


Albumin


 


 


 1.1 g/dL


(3.4-5.0) 





 


Albumin/Globulin Ratio   0.2 (1.0-1.7)  


 


Test


 7/26/20


07:45 7/26/20


11:28 7/26/20


17:39 7/27/20


00:21


 


Urine Collection Type Unknown    


 


Urine Color Yellow    


 


Urine Clarity Clear    


 


Urine pH 5.0 (<5.0-8.0)    


 


Urine Specific Gravity


 1.020


(1.000-1.030) 


 


 





 


Urine Protein


 30 mg/dL


(NEG-TRACE) 


 


 





 


Urine Glucose (UA)


 Negative mg/dL


(NEG) 


 


 





 


Urine Ketones (Stick)


 Negative mg/dL


(NEG) 


 


 





 


Urine Blood Negative (NEG)    


 


Urine Nitrite Negative (NEG)    


 


Urine Bilirubin Negative (NEG)    


 


Urine Urobilinogen Dipstick


 0.2 mg/dL (0.2


mg/dL) 


 


 





 


Urine Leukocyte Esterase Small (NEG)    


 


Urine RBC 0 /HPF (0-2)    


 


Urine WBC


 20-40 /HPF


(0-4) 


 


 





 


Urine Bacteria


 Moderate /HPF


(0-FEW) 


 


 





 


Urine Hyaline Casts Moderate /HPF    


 


Urine Mucus Marked /LPF    


 


Urine Yeast Present /HPF    


 


Glucose (Fingerstick)


 


 144 mg/dL


(70-99) 117 mg/dL


(70-99) 114 mg/dL


(70-99)


 


Test


 7/27/20


04:10 7/27/20


04:15 7/27/20


06:10 





 


White Blood Count


 12.1 x10^3/uL


(4.0-11.0) 


 


 





 


Red Blood Count


 2.71 x10^6/uL


(3.50-5.40) 


 


 





 


Hemoglobin


 7.7 g/dL


(12.0-15.5) 


 


 





 


Hematocrit


 23.2 %


(36.0-47.0) 


 


 





 


Mean Corpuscular Volume 85 fL ()    


 


Mean Corpuscular Hemoglobin 28 pg (25-35)    


 


Mean Corpuscular Hemoglobin


Concent 33 g/dL


(31-37) 


 


 





 


Red Cell Distribution Width


 16.0 %


(11.5-14.5) 


 


 





 


Platelet Count


 563 x10^3/uL


(140-400) 


 


 





 


Neutrophils (%) (Auto) 78 % (31-73)    


 


Lymphocytes (%) (Auto) 12 % (24-48)    


 


Monocytes (%) (Auto) 8 % (0-9)    


 


Eosinophils (%) (Auto) 2 % (0-3)    


 


Basophils (%) (Auto) 1 % (0-3)    


 


Neutrophils # (Auto)


 9.4 x10^3/uL


(1.8-7.7) 


 


 





 


Lymphocytes # (Auto)


 1.4 x10^3/uL


(1.0-4.8) 


 


 





 


Monocytes # (Auto)


 0.9 x10^3/uL


(0.0-1.1) 


 


 





 


Eosinophils # (Auto)


 0.3 x10^3/uL


(0.0-0.7) 


 


 





 


Basophils # (Auto)


 0.1 x10^3/uL


(0.0-0.2) 


 


 





 


Sodium Level


 


 135 mmol/L


(136-145) 


 





 


Potassium Level


 


 4.6 mmol/L


(3.5-5.1) 


 





 


Chloride Level


 


 103 mmol/L


() 


 





 


Carbon Dioxide Level


 


 25 mmol/L


(21-32) 


 





 


Anion Gap  7 (6-14)   


 


Blood Urea Nitrogen


 


 16 mg/dL


(7-20) 


 





 


Creatinine


 


 0.7 mg/dL


(0.6-1.0) 


 





 


Estimated GFR


(Cockcroft-Gault) 


 88.9 


 


 





 


BUN/Creatinine Ratio  23 (6-20)   


 


Glucose Level


 


 114 mg/dL


(70-99) 


 





 


Calcium Level


 


 10.9 mg/dL


(8.5-10.1) 


 





 


Total Bilirubin


 


 0.5 mg/dL


(0.2-1.0) 


 





 


Aspartate Amino Transf


(AST/SGOT) 


 18 U/L (15-37) 


 


 





 


Alanine Aminotransferase


(ALT/SGPT) 


 13 U/L (14-59) 


 


 





 


Alkaline Phosphatase


 


 123 U/L


() 


 





 


Total Protein


 


 5.4 g/dL


(6.4-8.2) 


 





 


Albumin


 


 1.1 g/dL


(3.4-5.0) 


 





 


Albumin/Globulin Ratio  0.3 (1.0-1.7)   


 


Glucose (Fingerstick)


 


 


 116 mg/dL


(70-99) 











Laboratory Tests








Test


 7/26/20


17:39 7/27/20


00:21 7/27/20


04:10 7/27/20


04:15


 


Glucose (Fingerstick)


 117 mg/dL


(70-99) 114 mg/dL


(70-99) 


 





 


White Blood Count


 


 


 12.1 x10^3/uL


(4.0-11.0) 





 


Red Blood Count


 


 


 2.71 x10^6/uL


(3.50-5.40) 





 


Hemoglobin


 


 


 7.7 g/dL


(12.0-15.5) 





 


Hematocrit


 


 


 23.2 %


(36.0-47.0) 





 


Mean Corpuscular Volume   85 fL ()  


 


Mean Corpuscular Hemoglobin   28 pg (25-35)  


 


Mean Corpuscular Hemoglobin


Concent 


 


 33 g/dL


(31-37) 





 


Red Cell Distribution Width


 


 


 16.0 %


(11.5-14.5) 





 


Platelet Count


 


 


 563 x10^3/uL


(140-400) 





 


Neutrophils (%) (Auto)   78 % (31-73)  


 


Lymphocytes (%) (Auto)   12 % (24-48)  


 


Monocytes (%) (Auto)   8 % (0-9)  


 


Eosinophils (%) (Auto)   2 % (0-3)  


 


Basophils (%) (Auto)   1 % (0-3)  


 


Neutrophils # (Auto)


 


 


 9.4 x10^3/uL


(1.8-7.7) 





 


Lymphocytes # (Auto)


 


 


 1.4 x10^3/uL


(1.0-4.8) 





 


Monocytes # (Auto)


 


 


 0.9 x10^3/uL


(0.0-1.1) 





 


Eosinophils # (Auto)


 


 


 0.3 x10^3/uL


(0.0-0.7) 





 


Basophils # (Auto)


 


 


 0.1 x10^3/uL


(0.0-0.2) 





 


Sodium Level


 


 


 


 135 mmol/L


(136-145)


 


Potassium Level


 


 


 


 4.6 mmol/L


(3.5-5.1)


 


Chloride Level


 


 


 


 103 mmol/L


()


 


Carbon Dioxide Level


 


 


 


 25 mmol/L


(21-32)


 


Anion Gap    7 (6-14) 


 


Blood Urea Nitrogen


 


 


 


 16 mg/dL


(7-20)


 


Creatinine


 


 


 


 0.7 mg/dL


(0.6-1.0)


 


Estimated GFR


(Cockcroft-Gault) 


 


 


 88.9 





 


BUN/Creatinine Ratio    23 (6-20) 


 


Glucose Level


 


 


 


 114 mg/dL


(70-99)


 


Calcium Level


 


 


 


 10.9 mg/dL


(8.5-10.1)


 


Total Bilirubin


 


 


 


 0.5 mg/dL


(0.2-1.0)


 


Aspartate Amino Transf


(AST/SGOT) 


 


 


 18 U/L (15-37) 





 


Alanine Aminotransferase


(ALT/SGPT) 


 


 


 13 U/L (14-59) 





 


Alkaline Phosphatase


 


 


 


 123 U/L


()


 


Total Protein


 


 


 


 5.4 g/dL


(6.4-8.2)


 


Albumin


 


 


 


 1.1 g/dL


(3.4-5.0)


 


Albumin/Globulin Ratio    0.3 (1.0-1.7) 


 


Test


 7/27/20


06:10 


 


 





 


Glucose (Fingerstick)


 116 mg/dL


(70-99) 


 


 











Medications





Active Scripts








 Medications  Dose


 Route/Sig


 Max Daily Dose Days Date Category


 


 Bisoprolol


 Fumarate 5 Mg


 Tablet  10 Mg


 PO DAILY


   3/16/20 Reported








Comments








ct reviewed 7/12/20, Decreased left-sided effusion after catheter placement. The




right-sided effusion has increased as has atelectasis.


 





 


There has been exchange or placement of multiple drainage 


tubes and a gastrojejunostomy tube. Both collections are smaller. No 


significant new abdominal fluid collection is seen. The jejunal component 


of the gastrojejunostomy tube appears to be looped in the proximal small 


bowel. 











ct abdomen /pelvis 6/6


1. Removal of the percutaneous pigtail drainage catheters since the prior 


exam. Sequela of pancreatitis with extensive pseudocysts again 


demonstrated, the right-sided collections are slightly larger since the 


prior exam, the left-sided collections are stable. See above.


2. Moderate to large left pleural effusion with atelectasis and collapse 


of most of the left lower lobe, stable. Small right pleural effusion is 


stable.


3. Gallstone.





ct chest 6/15 reviewed








 GRAM NEG COCCOBACILLI:MANY


        SQUAMOUS EPI CELL:RARE


        PMN (WBCs):[id number]


        Unless otherwise specified, Testing Performed by:


        [address] [address]


        For Inquires, the Physician may contact the Microbiology


        department at [phone]





  RESPIRATORY CULTURE  Final  


        Final





        MANY GRAM NEGATIVE RODS on 06/15/20 at 1107


        FINAL ID= [PSEUDOMONAS AERUGINOSA]


        MICRO CHARGES


        PSEUDOMONAS AERUGINOSA





  ANTIMICROBIAL SUSCEPTIBILITY  Final  


        Comment





        NEG ANSON 56


        PSEUDOMONAS AERUGINOSA


        ANTIBIOTIC                        RESULT          INTERPRETATION


        AMIKACIN                          <=16                  S


        AZTREONAM                         <=4                   S


        CEFTAZIDIME                       <=1                   S


        CIPROFLOXACIN                     <=0.25                S


        CEFEPIME                          <=2                   S


        CEFTAZIDIME/AVIBACTAM             <=4                   S


        GENTAMICIN                        <=2                   S


        LEVOFLOXACIN                      <=0.5                 S





Impression


.





IMPRESSION:


1.  Acute hypoxemic respiratory failure secondary to ARDS status post trach, 

developed anemia 6/7, blood drainage from RLQ abdomen drain site, and 

surrounding firmness  / developed septic shock 6/7 from abdomen source, required

levo 6/7


s/p 3 new drains 6/7 with brown color drainage,  


S/P Exp. 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 

parapsilosis & PSAE (I-merrem/ceftazidime/AZT/cefepime))


Leucocytosis -trending upward 


Fever 


Acute gallstone pancreatitis with persistent necrosis


  - 4/9.  CT A/P Increased ascites. Persistent evidence of necrotizing 

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


  - 4/27. status post ROBERT drain placement; C. parapsilosis. s/p drain 5/6 + yeast

& high amylase; s/p additional drain on 5/8. Drains removed. 


  -5/6. fluid  candida parapsilosis fluid, amylase high


  - 6/6 showed multiple pseudocysts, slight larger on the right. s/p drains x 3,

6/7.  + PSAE (MDRO-R Cefepime, Zosyn ANSON < 64) and yeast, 


  -6/7 s/p drain replacement x 3; fluid cult PSAE (MDRO), yeast; treated


  -7/12 CT A/P shows smaller fluid collections.  


  -722 CT abdomen and pelvis drains in place       


Ascites s/p paracentesis 4/15 & 5/6. C. parapsilosis 


Cholelithiasis with thickening of the gallbladder wall.


JED, Hyperkalemia, Metabolic acidosis off dialysis


Acute hypoxic resp failure. trach/vent. sputum 6/13  + PSAE (I merrem) ; sputum 

culture July 19+ for PSAE R Merrem, sensitive to cefepime


Pleural effusion status post CTS left side


 Abdominal fluid culture 6 /7 MDRO Pseudomonas, yeast


Sputum culture positive 7/19 for MDRO Pseudomonas


Chest tube fluid positive for 7/21 Candida Parapsilosis


S/P Exploratory laparotomy, lysis of adhesions, subtotal cholecystectomy with 

cholangiogram, gastrojejunostomy tube placement, pancreatic necrosectomy


leukocytosis- improving 





7/23 fluid from the chest tube


  GRAM STAIN  Final  


        Final





        NO ORGANISMS SEEN.


        SQUAMOUS EPI CELL:NOT APPLICABLE


        PMN (WBCs):RARE


        Unless otherwise specified, Testing Performed by:


        [address] [address]


        For Inquires, the Physician may contact the Microbiology


        department at [phone]





  ANAEROBIC-AEROBIC CULTURE  Preliminary  


        Preliminary





        No Growth on 07/22/20 at 0957


        Unless otherwise specified, Testing Performed by:


        [address] [address]


        For Inquires, the Physician may contact the Microbiology


        department at [phone]











Antibiotics per ID, since 7/23


Restart Avycaz 


DC cefepime


cont micafungin and dapto





Plan


.


Fever/ per ID


chest tube REMOVED 7/24


Transfuse as needed


Antibiotics per ID 


Discussed with RN 


ASK SURGERY TO CONSIDER USING GUT FOR NUTRITION


Monitor H&H


Trach shield, as tolerated, on room air


Up to chair, pt/ot


Follow culture


Follow surgery input


DVT GI prophylaxis


f/u BC /resp cultures 


stable pulmonary wise. will see PRN


DVT/GI PPX











SHARYN SOLORZANO MD                 Jul 27, 2020 11:39

## 2020-07-27 NOTE — PDOC
Infectious Disease Note


Subjective


Subjective


Pt resting quietly


States ok.  No N/SOA. pain ok


On 2 L O2 by nasal cannula


TPN





ROS


ROS


w neg





Vital Sign


Vital Signs





Vital Signs








  Date Time  Temp Pulse Resp B/P (MAP) Pulse Ox O2 Delivery O2 Flow Rate FiO2


 


7/27/20 04:48     98 Nasal Cannula 2.0 


 


7/27/20 03:57   23     


 


7/27/20 03:00 98.6 119  113/62 (79)    





 98.6       











Physical Exam


PHYSICAL EXAM


GENERAL: pt in bed, appears weak -comfortable


HEENT: Pupils equal, oral cavity dry. OC/Op - Dry


NECK:  Tracheostomy - no JVD


LUNGS: Diminished aeration bases,  CT on left 


HEART:  S1, S2, 


ABDOMEN: Distended mild- bowel sounds hypoactive, soft, richardson x 2, ROBERT drains, 

G-J tube


: [place] in place 


EXTREMITIES: Trace generalized edema, no cyanosis. MARTHA hose bilaterally, 


SKIN: warm touch. No signs of rash.  


LUE- PICC without signs of complications


NEURO: - Alert and responsive





Labs


Lab





Laboratory Tests








Test


 7/26/20


07:45 7/26/20


11:28 7/26/20


17:39 7/27/20


00:21


 


Urine Collection Type Unknown    


 


Urine Color Yellow    


 


Urine Clarity Clear    


 


Urine pH 5.0 (<5.0-8.0)    


 


Urine Specific Gravity


 1.020


(1.000-1.030) 


 


 





 


Urine Protein


 30 mg/dL


(NEG-TRACE) 


 


 





 


Urine Glucose (UA)


 Negative mg/dL


(NEG) 


 


 





 


Urine Ketones (Stick)


 Negative mg/dL


(NEG) 


 


 





 


Urine Blood Negative (NEG)    


 


Urine Nitrite Negative (NEG)    


 


Urine Bilirubin Negative (NEG)    


 


Urine Urobilinogen Dipstick


 0.2 mg/dL (0.2


mg/dL) 


 


 





 


Urine Leukocyte Esterase Small (NEG)    


 


Urine RBC 0 /HPF (0-2)    


 


Urine WBC


 20-40 /HPF


(0-4) 


 


 





 


Urine Bacteria


 Moderate /HPF


(0-FEW) 


 


 





 


Urine Hyaline Casts Moderate /HPF    


 


Urine Mucus Marked /LPF    


 


Urine Yeast Present /HPF    


 


Glucose (Fingerstick)


 


 144 mg/dL


(70-99) 117 mg/dL


(70-99) 114 mg/dL


(70-99)


 


Test


 7/27/20


04:10 7/27/20


04:15 7/27/20


06:10 





 


White Blood Count


 12.1 x10^3/uL


(4.0-11.0) 


 


 





 


Red Blood Count


 2.71 x10^6/uL


(3.50-5.40) 


 


 





 


Hemoglobin


 7.7 g/dL


(12.0-15.5) 


 


 





 


Hematocrit


 23.2 %


(36.0-47.0) 


 


 





 


Mean Corpuscular Volume 85 fL ()    


 


Mean Corpuscular Hemoglobin 28 pg (25-35)    


 


Mean Corpuscular Hemoglobin


Concent 33 g/dL


(31-37) 


 


 





 


Red Cell Distribution Width


 16.0 %


(11.5-14.5) 


 


 





 


Platelet Count


 563 x10^3/uL


(140-400) 


 


 





 


Neutrophils (%) (Auto) 78 % (31-73)    


 


Lymphocytes (%) (Auto) 12 % (24-48)    


 


Monocytes (%) (Auto) 8 % (0-9)    


 


Eosinophils (%) (Auto) 2 % (0-3)    


 


Basophils (%) (Auto) 1 % (0-3)    


 


Neutrophils # (Auto)


 9.4 x10^3/uL


(1.8-7.7) 


 


 





 


Lymphocytes # (Auto)


 1.4 x10^3/uL


(1.0-4.8) 


 


 





 


Monocytes # (Auto)


 0.9 x10^3/uL


(0.0-1.1) 


 


 





 


Eosinophils # (Auto)


 0.3 x10^3/uL


(0.0-0.7) 


 


 





 


Basophils # (Auto)


 0.1 x10^3/uL


(0.0-0.2) 


 


 





 


Sodium Level


 


 135 mmol/L


(136-145) 


 





 


Potassium Level


 


 4.6 mmol/L


(3.5-5.1) 


 





 


Chloride Level


 


 103 mmol/L


() 


 





 


Carbon Dioxide Level


 


 25 mmol/L


(21-32) 


 





 


Anion Gap  7 (6-14)   


 


Blood Urea Nitrogen


 


 16 mg/dL


(7-20) 


 





 


Creatinine


 


 0.7 mg/dL


(0.6-1.0) 


 





 


Estimated GFR


(Cockcroft-Gault) 


 88.9 


 


 





 


BUN/Creatinine Ratio  23 (6-20)   


 


Glucose Level


 


 114 mg/dL


(70-99) 


 





 


Calcium Level


 


 10.9 mg/dL


(8.5-10.1) 


 





 


Total Bilirubin


 


 0.5 mg/dL


(0.2-1.0) 


 





 


Aspartate Amino Transf


(AST/SGOT) 


 18 U/L (15-37) 


 


 





 


Alanine Aminotransferase


(ALT/SGPT) 


 13 U/L (14-59) 


 


 





 


Alkaline Phosphatase


 


 123 U/L


() 


 





 


Total Protein


 


 5.4 g/dL


(6.4-8.2) 


 





 


Albumin


 


 1.1 g/dL


(3.4-5.0) 


 





 


Albumin/Globulin Ratio  0.3 (1.0-1.7)   


 


Glucose (Fingerstick)


 


 


 116 mg/dL


(70-99) 











Micro


6/7 





GRAM STAIN  Final  


        Final





        GRAM NEGATIVE RODS:MODERATE


        SQUAMOUS EPI CELL:NOT APPLICABLE


        PMN (WBCs):RARE


        YEAST:MODERATE


        Unless otherwise specified, Testing Performed by:


        [address] [address]


        For Inquires, the Physician may contact the Microbiology


        department at [phone]





  ANAEROBIC-AEROBIC CULTURE  Preliminary  


        Preliminary





        MANY GRAM NEGATIVE RODS on 06/09/20 at 1158


        FINAL ID= [PSEUDOMONAS AERUGINOSA]


        PSEUDOMONAS AERUGINOSA





  ANTIMICROBIAL SUSCEPTIBILITY  Preliminary  


        Comment





        NEG ANSON 56


        PSEUDOMONAS AERUGINOSA


        ANTIBIOTIC                        RESULT          INTERPRETATION


        AMIKACIN                          <=16                  S


        AZTREONAM                         >16                   R


        CEFTAZIDIME                       >16                   R


        CIPROFLOXACIN                     <=0.25                S


        CEFEPIME                          16                    I


        CEFTAZIDIME/AVIBACTAM             <=4                   S


        GENTAMICIN                        <=2                   S














                               ** CONTINUED ON NEXT PAGE **





 

--------------------------------------------------------------------------------


------------





RUN DATE: 06/11/20                  [facility] *LIVE*               

  PAGE 2   


RUN TIME: 1016                            Specimen Inquiry                    


 

--------------------------------------------------------------------------------


------------





SPEC: [id number]    PATIENT: JESENIA BEAN                [id number]  

(Continued)


-----------------------------

---------------------------------------------------------------








--------------------------------------------------------------

------------------------------





  Procedure                         Result                                      

         


-----

--------------------------------------------------------------------------------


-------





  ANTIMICROBIAL SUSCEPTIBILITY  Preliminary   (continued)


        LEVOFLOXACIN                      <=0.5                 S


        MEROPENEM                         <=1                   S


        PIPERACILLIN/TAZOBACTAM           64                    S


        TOBRAMYCIN                        <=2                   S


        Unless otherwise specified, Testing Performed by:


        [address] [address]


        For Inquires, the Physician may contact the Microbiology


        department at [phone]











CT Scan 6/6





IMPRESSION:


1. Removal of the percutaneous pigtail drainage catheters since the prior 


exam. Sequela of pancreatitis with extensive pseudocysts again 


demonstrated, the right-sided collections are slightly larger since the 


prior exam, the left-sided collections are stable. See above.


2. Moderate to large left pleural effusion with atelectasis and collapse 


of most of the left lower lobe, stable. Small right pleural effusion is 


stable.


3. Gallstone.





Objective


Assessment


Assessment:


Patient with prolonged hospitalization more than 4 months


Multiple medical problems


Multiple surgical procedures





S/P Exp. 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 

parapsilosis & PSAE (I-merrem/ceftazidime/AZT/cefepime))


Leucocytosis - better


Fever - 7/25 c-diff neg


Anemia


Acute gallstone pancreatitis with persistent necrosis


  - 4/9.  CT A/P Increased ascites. Persistent evidence of necrotizing 

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


  - 4/27. status post ROBERT drain placement; C. parapsilosis. s/p drain 5/6 + yeast

& high amylase; s/p additional drain on 5/8. Drains removed. 


  -5/6. fluid  candida parapsilosis fluid, amylase high


  - 6/6 showed multiple pseudocysts, slight larger on the right. s/p drains x 3,

6/7.  + PSAE (MDRO-R Cefepime, Zosyn ANSON < 64) and yeast, 


  -6/7 s/p drain replacement x 3; fluid cult PSAE (MDRO), yeast; treated


  -7/12 CT A/P shows smaller fluid collections.  


  -722 CT abdomen and pelvis drains in place       


Ascites s/p paracentesis 4/15 & 5/6. C. parapsilosis 


Cholelithiasis with thickening of the gallbladder wall.


JED, Hyperkalemia, Metabolic acidosis off dialysis


Acute hypoxic resp failure. trach/vent. sputum 6/13  + PSAE (I merrem) ; sputum 

culture July 19+ for PSAE R Merrem, sensitive to cefepime


Pleural effusion status post CTS left side


 Abdominal fluid culture 6 /7 MDRO Pseudomonas, yeast


Sputum culture positive 7/19 for MDRO Pseudomonas


Chest tube fluid positive for 7/21 Candida Parapsilosis





Plan


Plan of Care





Continue Avycaz 7/23 /micafungin 6/30


Restart daptomycin 7/26


Replace PICC if Blood cults neg 48 hours


CK/CBC/CMP in am


UA and urine culture Blood culture - pending


DC PICC line 7/26


BC from 7 /21  neg to date 


C. difficile negative


Monitor WBC/temp 


Wound care /drain management as directed


Contact isolation for CRE/MDRO








Critically ill





Long term prognosis  poor





D/w nursing











RASHAWN ROSEN MD              Jul 27, 2020 07:28

## 2020-07-27 NOTE — NUR
SS following up with discharge planning. SS reviewed pt chart and discussed with pt RN. Pt 
is from home and is currently requiring two liters nasal canula. Pt on IV Daptomycin, IV 
Micafungin, and IV Avycaz. Pt on PPN. Per RN, pt is wearing speaking valve intermittently. 
Pt has ROBERT drains x2, Avi drains x1, and G tube. SS will continue to follow for 
discharge planning.

## 2020-07-27 NOTE — PDOC
G I PROGRESS NOTE


Subjective


In chair.  Waves.  Looks frail.


Physical Exam


On trach tube.


Multiple drains.


Review of Relevant


I have reviewed the following items josy (where applicable) has been applied.


Labs





Laboratory Tests








Test


 7/25/20


17:48 7/25/20


23:49 7/26/20


05:55 7/26/20


06:00


 


Glucose (Fingerstick)


 132 mg/dL


(70-99) 139 mg/dL


(70-99) 


 120 mg/dL


(70-99)


 


White Blood Count


 


 


 14.7 x10^3/uL


(4.0-11.0) 





 


Red Blood Count


 


 


 2.78 x10^6/uL


(3.50-5.40) 





 


Hemoglobin


 


 


 7.7 g/dL


(12.0-15.5) 





 


Hematocrit


 


 


 23.8 %


(36.0-47.0) 





 


Mean Corpuscular Volume   86 fL ()  


 


Mean Corpuscular Hemoglobin   28 pg (25-35)  


 


Mean Corpuscular Hemoglobin


Concent 


 


 33 g/dL


(31-37) 





 


Red Cell Distribution Width


 


 


 16.0 %


(11.5-14.5) 





 


Platelet Count


 


 


 563 x10^3/uL


(140-400) 





 


Neutrophils (%) (Auto)   83 % (31-73)  


 


Lymphocytes (%) (Auto)   8 % (24-48)  


 


Monocytes (%) (Auto)   7 % (0-9)  


 


Eosinophils (%) (Auto)   3 % (0-3)  


 


Basophils (%) (Auto)   0 % (0-3)  


 


Neutrophils # (Auto)


 


 


 12.2 x10^3/uL


(1.8-7.7) 





 


Lymphocytes # (Auto)


 


 


 1.2 x10^3/uL


(1.0-4.8) 





 


Monocytes # (Auto)


 


 


 1.0 x10^3/uL


(0.0-1.1) 





 


Eosinophils # (Auto)


 


 


 0.4 x10^3/uL


(0.0-0.7) 





 


Basophils # (Auto)


 


 


 0.0 x10^3/uL


(0.0-0.2) 





 


Sodium Level


 


 


 138 mmol/L


(136-145) 





 


Potassium Level


 


 


 4.4 mmol/L


(3.5-5.1) 





 


Chloride Level


 


 


 106 mmol/L


() 





 


Carbon Dioxide Level


 


 


 29 mmol/L


(21-32) 





 


Anion Gap   3 (6-14)  


 


Blood Urea Nitrogen


 


 


 15 mg/dL


(7-20) 





 


Creatinine


 


 


 0.5 mg/dL


(0.6-1.0) 





 


Estimated GFR


(Cockcroft-Gault) 


 


 131.1 


 





 


BUN/Creatinine Ratio   30 (6-20)  


 


Glucose Level


 


 


 123 mg/dL


(70-99) 





 


Calcium Level


 


 


 10.3 mg/dL


(8.5-10.1) 





 


Total Bilirubin


 


 


 0.4 mg/dL


(0.2-1.0) 





 


Aspartate Amino Transf


(AST/SGOT) 


 


 14 U/L (15-37) 


 





 


Alanine Aminotransferase


(ALT/SGPT) 


 


 12 U/L (14-59) 


 





 


Alkaline Phosphatase


 


 


 119 U/L


() 





 


Total Protein


 


 


 5.9 g/dL


(6.4-8.2) 





 


Albumin


 


 


 1.1 g/dL


(3.4-5.0) 





 


Albumin/Globulin Ratio   0.2 (1.0-1.7)  


 


Test


 7/26/20


07:45 7/26/20


11:28 7/26/20


17:39 7/27/20


00:21


 


Urine Collection Type Unknown    


 


Urine Color Yellow    


 


Urine Clarity Clear    


 


Urine pH 5.0 (<5.0-8.0)    


 


Urine Specific Gravity


 1.020


(1.000-1.030) 


 


 





 


Urine Protein


 30 mg/dL


(NEG-TRACE) 


 


 





 


Urine Glucose (UA)


 Negative mg/dL


(NEG) 


 


 





 


Urine Ketones (Stick)


 Negative mg/dL


(NEG) 


 


 





 


Urine Blood Negative (NEG)    


 


Urine Nitrite Negative (NEG)    


 


Urine Bilirubin Negative (NEG)    


 


Urine Urobilinogen Dipstick


 0.2 mg/dL (0.2


mg/dL) 


 


 





 


Urine Leukocyte Esterase Small (NEG)    


 


Urine RBC 0 /HPF (0-2)    


 


Urine WBC


 20-40 /HPF


(0-4) 


 


 





 


Urine Bacteria


 Moderate /HPF


(0-FEW) 


 


 





 


Urine Hyaline Casts Moderate /HPF    


 


Urine Mucus Marked /LPF    


 


Urine Yeast Present /HPF    


 


Glucose (Fingerstick)


 


 144 mg/dL


(70-99) 117 mg/dL


(70-99) 114 mg/dL


(70-99)


 


Test


 7/27/20


04:10 7/27/20


04:15 7/27/20


06:10 7/27/20


12:46


 


White Blood Count


 12.1 x10^3/uL


(4.0-11.0) 


 


 





 


Red Blood Count


 2.71 x10^6/uL


(3.50-5.40) 


 


 





 


Hemoglobin


 7.7 g/dL


(12.0-15.5) 


 


 





 


Hematocrit


 23.2 %


(36.0-47.0) 


 


 





 


Mean Corpuscular Volume 85 fL ()    


 


Mean Corpuscular Hemoglobin 28 pg (25-35)    


 


Mean Corpuscular Hemoglobin


Concent 33 g/dL


(31-37) 


 


 





 


Red Cell Distribution Width


 16.0 %


(11.5-14.5) 


 


 





 


Platelet Count


 563 x10^3/uL


(140-400) 


 


 





 


Neutrophils (%) (Auto) 78 % (31-73)    


 


Lymphocytes (%) (Auto) 12 % (24-48)    


 


Monocytes (%) (Auto) 8 % (0-9)    


 


Eosinophils (%) (Auto) 2 % (0-3)    


 


Basophils (%) (Auto) 1 % (0-3)    


 


Neutrophils # (Auto)


 9.4 x10^3/uL


(1.8-7.7) 


 


 





 


Lymphocytes # (Auto)


 1.4 x10^3/uL


(1.0-4.8) 


 


 





 


Monocytes # (Auto)


 0.9 x10^3/uL


(0.0-1.1) 


 


 





 


Eosinophils # (Auto)


 0.3 x10^3/uL


(0.0-0.7) 


 


 





 


Basophils # (Auto)


 0.1 x10^3/uL


(0.0-0.2) 


 


 





 


Sodium Level


 


 135 mmol/L


(136-145) 


 





 


Potassium Level


 


 4.6 mmol/L


(3.5-5.1) 


 





 


Chloride Level


 


 103 mmol/L


() 


 





 


Carbon Dioxide Level


 


 25 mmol/L


(21-32) 


 





 


Anion Gap  7 (6-14)   


 


Blood Urea Nitrogen


 


 16 mg/dL


(7-20) 


 





 


Creatinine


 


 0.7 mg/dL


(0.6-1.0) 


 





 


Estimated GFR


(Cockcroft-Gault) 


 88.9 


 


 





 


BUN/Creatinine Ratio  23 (6-20)   


 


Glucose Level


 


 114 mg/dL


(70-99) 


 





 


Calcium Level


 


 10.9 mg/dL


(8.5-10.1) 


 





 


Total Bilirubin


 


 0.5 mg/dL


(0.2-1.0) 


 





 


Aspartate Amino Transf


(AST/SGOT) 


 18 U/L (15-37) 


 


 





 


Alanine Aminotransferase


(ALT/SGPT) 


 13 U/L (14-59) 


 


 





 


Alkaline Phosphatase


 


 123 U/L


() 


 





 


Total Protein


 


 5.4 g/dL


(6.4-8.2) 


 





 


Albumin


 


 1.1 g/dL


(3.4-5.0) 


 





 


Albumin/Globulin Ratio  0.3 (1.0-1.7)   


 


Glucose (Fingerstick)


 


 


 116 mg/dL


(70-99) 141 mg/dL


(70-99)








Laboratory Tests








Test


 7/26/20


17:39 7/27/20


00:21 7/27/20


04:10 7/27/20


04:15


 


Glucose (Fingerstick)


 117 mg/dL


(70-99) 114 mg/dL


(70-99) 


 





 


White Blood Count


 


 


 12.1 x10^3/uL


(4.0-11.0) 





 


Red Blood Count


 


 


 2.71 x10^6/uL


(3.50-5.40) 





 


Hemoglobin


 


 


 7.7 g/dL


(12.0-15.5) 





 


Hematocrit


 


 


 23.2 %


(36.0-47.0) 





 


Mean Corpuscular Volume   85 fL ()  


 


Mean Corpuscular Hemoglobin   28 pg (25-35)  


 


Mean Corpuscular Hemoglobin


Concent 


 


 33 g/dL


(31-37) 





 


Red Cell Distribution Width


 


 


 16.0 %


(11.5-14.5) 





 


Platelet Count


 


 


 563 x10^3/uL


(140-400) 





 


Neutrophils (%) (Auto)   78 % (31-73)  


 


Lymphocytes (%) (Auto)   12 % (24-48)  


 


Monocytes (%) (Auto)   8 % (0-9)  


 


Eosinophils (%) (Auto)   2 % (0-3)  


 


Basophils (%) (Auto)   1 % (0-3)  


 


Neutrophils # (Auto)


 


 


 9.4 x10^3/uL


(1.8-7.7) 





 


Lymphocytes # (Auto)


 


 


 1.4 x10^3/uL


(1.0-4.8) 





 


Monocytes # (Auto)


 


 


 0.9 x10^3/uL


(0.0-1.1) 





 


Eosinophils # (Auto)


 


 


 0.3 x10^3/uL


(0.0-0.7) 





 


Basophils # (Auto)


 


 


 0.1 x10^3/uL


(0.0-0.2) 





 


Sodium Level


 


 


 


 135 mmol/L


(136-145)


 


Potassium Level


 


 


 


 4.6 mmol/L


(3.5-5.1)


 


Chloride Level


 


 


 


 103 mmol/L


()


 


Carbon Dioxide Level


 


 


 


 25 mmol/L


(21-32)


 


Anion Gap    7 (6-14) 


 


Blood Urea Nitrogen


 


 


 


 16 mg/dL


(7-20)


 


Creatinine


 


 


 


 0.7 mg/dL


(0.6-1.0)


 


Estimated GFR


(Cockcroft-Gault) 


 


 


 88.9 





 


BUN/Creatinine Ratio    23 (6-20) 


 


Glucose Level


 


 


 


 114 mg/dL


(70-99)


 


Calcium Level


 


 


 


 10.9 mg/dL


(8.5-10.1)


 


Total Bilirubin


 


 


 


 0.5 mg/dL


(0.2-1.0)


 


Aspartate Amino Transf


(AST/SGOT) 


 


 


 18 U/L (15-37) 





 


Alanine Aminotransferase


(ALT/SGPT) 


 


 


 13 U/L (14-59) 





 


Alkaline Phosphatase


 


 


 


 123 U/L


()


 


Total Protein


 


 


 


 5.4 g/dL


(6.4-8.2)


 


Albumin


 


 


 


 1.1 g/dL


(3.4-5.0)


 


Albumin/Globulin Ratio    0.3 (1.0-1.7) 


 


Test


 7/27/20


06:10 7/27/20


12:46 


 





 


Glucose (Fingerstick)


 116 mg/dL


(70-99) 141 mg/dL


(70-99) 


 











Microbiology


7/26/20 Gram Stain - Final, Resulted


          


7/26/20 Aerobic Culture, Resulted


          Pending


7/26/20 Blood Culture - Preliminary, Resulted


          NO GROWTH AFTER 1 DAY


7/21/20 Gram Stain - Final, Complete


          


7/21/20 Aerobic and Anaerobic Culture - Final, Complete


          


7/19/20 Gram Stain Evaluation - Final, Complete


          


7/19/20 Respiratory Culture - Final, Complete


          


7/19/20 Antimicrobic Susceptibility - Final, Complete


          


6/30/20 Gram Stain - Final, Complete


          


6/30/20 Aerobic and Anaerobic Culture - Final, Complete


          


6/30/20 Antimicrobic Susceptibility - Final, Complete


          


6/7/20 Urine Culture - Final, Complete


Vitals/I & O





Vital Sign - Last 24 Hours








 7/26/20 7/26/20 7/26/20 7/26/20





 14:02 14:32 15:00 15:56


 


Temp   99.6 





   99.6 


 


Pulse   140 


 


Resp 35 30 30 


 


B/P (MAP)   122/75 (91) 


 


Pulse Ox 100 99 99 98


 


O2 Delivery Nasal Cannula Nasal Cannula Nasal Cannula Nasal Cannula


 


O2 Flow Rate 2.0 2.0 2.0 2.0


 


    





    





 7/26/20 7/26/20 7/26/20 7/26/20





 17:35 19:00 19:00 20:00


 


Temp   98.6 





   98.6 


 


Pulse   129 


 


Resp 35 26 28 


 


B/P (MAP)   110/75 (87) 


 


Pulse Ox 99 98 100 


 


O2 Delivery Nasal Cannula Nasal Cannula Nasal Cannula Nasal Cannula


 


O2 Flow Rate 2.0  2.0 2.0


 


    





    





 7/26/20 7/26/20 7/26/20 7/26/20





 20:13 20:14 20:35 21:05


 


Resp   29 26


 


Pulse Ox 98 98 99 96


 


O2 Delivery Nasal Cannula Nasal Cannula Nasal Cannula Nasal Cannula


 


O2 Flow Rate 2.0 2.0  





 7/26/20 7/27/20 7/27/20 7/27/20





 23:00 00:27 03:00 03:27


 


Temp 99.0  98.6 





 99.0  98.6 


 


Pulse 122  119 


 


Resp 26 29 28


 


B/P (MAP) 133/66 (88)  113/62 (79) 


 


Pulse Ox 99 98 100 99


 


O2 Delivery Nasal Cannula Nasal Cannula Nasal Cannula Nasal Cannula


 


O2 Flow Rate 2.0 2.0 2.0 


 


    





    





 7/27/20 7/27/20 7/27/20 7/27/20





 03:57 04:48 07:00 08:00


 


Temp   98.1 





   98.1 


 


Pulse   112 


 


Resp 23  24 


 


B/P (MAP)    


 


Pulse Ox 100 98 98 96


 


O2 Delivery Nasal Cannula Nasal Cannula Nasal Cannula Nasal Cannula


 


O2 Flow Rate  2.0 2.0 2.0


 


    





    





 7/27/20 7/27/20 7/27/20 7/27/20





 08:00 11:00 11:39 12:09


 


Pulse  130  


 


Resp  49 30 


 


B/P (MAP)    


 


Pulse Ox  98  95


 


O2 Delivery Nasal Cannula Nasal Cannula Nasal Cannula Nasal Cannula


 


O2 Flow Rate 2.0 2.0 2.0 2.0





 7/27/20   





 12:09   


 


Pulse Ox 95   


 


O2 Delivery Nasal Cannula   


 


O2 Flow Rate 2.0   














Intake and Output   


 


 7/26/20 7/26/20 7/27/20





 15:00 23:00 07:00


 


Intake Total 400 ml 991 ml 1287 ml


 


Output Total 695 ml 400 ml 1070 ml


 


Balance -295 ml 591 ml 217 ml








Problem List


Problems


Medical Problems:


(1) Acute pancreatitis


Status: Acute  





(2) Cholelithiasis


Status: Acute  





Assessment


Necrotizing biliary pancreatitis with multiple complications, interventions.  

Remains critically ill, weak.


Plan of Care Note


Continue support.





Justicifation of Admission Dx:


Justifications for Admission:


Justification of Admission Dx:  Yes











MARCO ANTONIO LONGO MD          Jul 27, 2020 13:52

## 2020-07-27 NOTE — PDOC
[facility] PROGRESS NOTE


Chief Complaint


Chief Complaint


S/P Exp. 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 

parapsilosis & PSAE (I-merrem/ceftazidime/AZT/cefepime))


Leucocytosis -trending upward 


Fever 


Acute gallstone pancreatitis with persistent necrosis


Postop (Exploratory laparotomy, lysis of adhesions, subtotal cholecystectomy 

with cholangiogram, gastrojejunostomy tube placement, pancreatic necrosectomy)


Acute hypoxic Respiratory failure required  mechanical ventilation


Tracheostomy


bilateral pleural effusions/pulm edema s/p Throacentesis on 6/15/2020


Severe Acute gallstone pancreatitis (not a surgical candidate at this time) with

necrosis


Acute kidney failure now requiring dialysis


Gallstones (Calculus of gallbladder with acute cholecystitis without obstru

ction)


HTN 


Intractable pain


Intractable nausea


Covid 19 negative. 


Acute on chronic anemia 


EEG: No seizure activity


Fever  - intermittent 


? Ileus with vomiting


Abd distention - U/S and CT reviewed s/p 0.4 L of opaque, debris-containing 

ascites was removed 5/6


Acute pancreatitis with persistent necrosis


Gallstone pancreatitis with necrosis. 


   -CT A/P 6/6 showed multiple pseudocysts, slight larger on the right. s/p 

drains x 3, 6/7.  + PSAE (MDRO-R Cefepime, Zosyn ANSON < 64) and yeast, 


   -s/p drain 4/27. C. parapsilosis. s/p drain 5/6 + yeast & high amylase; s/p 

additional drain on 5/8. Drains removed. 


Ascites s/p paracentesis 4/15 & 5/6. C. parapsilosis 


JED. off HD. 


A large fluid collection in the pancreatic bed has slightly decreased in size, 

described below, the pancreas itself is difficult to  visualize, which could be 

due to necrosis or obscuration of pancreatic  parenchyma from the surrounding 

fluid collection.6/15 


- 4/27 status post ROBERT drain placement + C paropsilosis. s/p additional drains 

5/8


Anemia - S/p PRBCs. 


Cholelithiasis with thickening of the gallbladder wall.


Leucocytosis improving


JED, hyperkalemia, Metabolic acidosis off dialysis


hypocalcemia 


Prediabetes


HTN


s/p trach


Hyperglycemia


severe protein-caloric malnutrition


Moderate to large left pleural effusion with atelectasis and collapse  of most 

of the left lower lobe, stable


Extensive retroperitoneal fluid collections persist. Percutaneous  drains remain

within the collections in both paracolic gutters. These  communicate with additi

onal pelvic and peripancreatic collections.











PLAN================











Continue Avycaz /micafungin, DC dapto per ID recommendations


Negative C diff


BC from 7/15 neg to date 


7/26 PICC line removed will start PPN for 24 hours then replace PICC line other 

side


Follow surgery input regarding diet and drains


DVT GI prophylaxis


f/u BC /resp cultures


continue DVT/GI PPX


Discussed with RN

















40 MIN CC TIME





History of Present Illness


History of Present Illness


7/27/2020


Patient seen and examined in the ICU.  Patient still requires extensive care.  

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  Chart, labs, imaging was 

reviewed.  @L UOP with + 500cc balance.








7/25 /2020





Patient seen in and examined in the ICU


She is still extremely critically ill


Appears weak, frail, and pale


Better color today with improved eye contact and expression


Discussed with RN


Chart reviewed











 








7/21/2020


Patient seen and examined in the ICU


She is still extremely critically ill


Appears extremely weak frail and pale


Discussed with RN


Chart reviewed





Vitals/I&O


Vitals/I&O:





                                   Vital Signs








  Date Time  Temp Pulse Resp B/P (MAP) Pulse Ox O2 Delivery O2 Flow Rate FiO2


 


7/27/20 08:00     96 Nasal Cannula 2.0 


 


7/27/20 03:57   23     


 


7/27/20 03:00 98.6 119  113/62 (79)    





 98.6       














                                    I & O   


 


 7/26/20 7/26/20 7/27/20





 15:00 23:00 07:00


 


Intake Total 400 ml 991 ml 1287 ml


 


Output Total 695 ml 400 ml 1070 ml


 


Balance -295 ml 591 ml 217 ml











Physical Exam


Physical Exam:


GENERAL: pt in bed, appears weak -comfortable


HEENT: Pupils equal, oral cavity dry. OC/Op - Dry


NECK:  Tracheostomy - no JVD


LUNGS: Diminished aeration bases,  CT on left 


HEART:  S1, S2, 


ABDOMEN: Distended mild- bowel sounds hypoactive, soft, richardson x 2, ROBERT drains, 

G-J tube


: [place] in place 


EXTREMITIES: Trace generalized edema, no cyanosis. MARTHA hose bilaterally, 


SKIN: warm touch. No signs of rash.  


LUE- PICC without signs of complications


NEURO: - Alert and responsive


General:  Alert, mild distress


Heart:  Other (distant heart sounds, tachycardic)


Lungs:  Crackles


Abdomen:  Normal bowel sounds, Soft, Other (Wound VAC in place.  Abdominal drain

with fair amount of drainage around the drain tube)


Extremities:  Other (Diffuse edema)


Skin:  No rashes, No significant lesion





Labs


Labs:





Laboratory Tests








Test


 7/26/20


11:28 7/26/20


17:39 7/27/20


00:21 7/27/20


04:10


 


Glucose (Fingerstick)


 144 mg/dL


(70-99) 117 mg/dL


(70-99) 114 mg/dL


(70-99) 





 


White Blood Count


 


 


 


 12.1 x10^3/uL


(4.0-11.0)


 


Red Blood Count


 


 


 


 2.71 x10^6/uL


(3.50-5.40)


 


Hemoglobin


 


 


 


 7.7 g/dL


(12.0-15.5)


 


Hematocrit


 


 


 


 23.2 %


(36.0-47.0)


 


Mean Corpuscular Volume    85 fL () 


 


Mean Corpuscular Hemoglobin    28 pg (25-35) 


 


Mean Corpuscular Hemoglobin


Concent 


 


 


 33 g/dL


(31-37)


 


Red Cell Distribution Width


 


 


 


 16.0 %


(11.5-14.5)


 


Platelet Count


 


 


 


 563 x10^3/uL


(140-400)


 


Neutrophils (%) (Auto)    78 % (31-73) 


 


Lymphocytes (%) (Auto)    12 % (24-48) 


 


Monocytes (%) (Auto)    8 % (0-9) 


 


Eosinophils (%) (Auto)    2 % (0-3) 


 


Basophils (%) (Auto)    1 % (0-3) 


 


Neutrophils # (Auto)


 


 


 


 9.4 x10^3/uL


(1.8-7.7)


 


Lymphocytes # (Auto)


 


 


 


 1.4 x10^3/uL


(1.0-4.8)


 


Monocytes # (Auto)


 


 


 


 0.9 x10^3/uL


(0.0-1.1)


 


Eosinophils # (Auto)


 


 


 


 0.3 x10^3/uL


(0.0-0.7)


 


Basophils # (Auto)


 


 


 


 0.1 x10^3/uL


(0.0-0.2)


 


Test


 7/27/20


04:15 7/27/20


06:10 


 





 


Sodium Level


 135 mmol/L


(136-145) 


 


 





 


Potassium Level


 4.6 mmol/L


(3.5-5.1) 


 


 





 


Chloride Level


 103 mmol/L


() 


 


 





 


Carbon Dioxide Level


 25 mmol/L


(21-32) 


 


 





 


Anion Gap 7 (6-14)    


 


Blood Urea Nitrogen


 16 mg/dL


(7-20) 


 


 





 


Creatinine


 0.7 mg/dL


(0.6-1.0) 


 


 





 


Estimated GFR


(Cockcroft-Gault) 88.9 


 


 


 





 


BUN/Creatinine Ratio 23 (6-20)    


 


Glucose Level


 114 mg/dL


(70-99) 


 


 





 


Calcium Level


 10.9 mg/dL


(8.5-10.1) 


 


 





 


Total Bilirubin


 0.5 mg/dL


(0.2-1.0) 


 


 





 


Aspartate Amino Transf


(AST/SGOT) 18 U/L (15-37) 


 


 


 





 


Alanine Aminotransferase


(ALT/SGPT) 13 U/L (14-59) 


 


 


 





 


Alkaline Phosphatase


 123 U/L


() 


 


 





 


Total Protein


 5.4 g/dL


(6.4-8.2) 


 


 





 


Albumin


 1.1 g/dL


(3.4-5.0) 


 


 





 


Albumin/Globulin Ratio 0.3 (1.0-1.7)    


 


Glucose (Fingerstick)


 


 116 mg/dL


(70-99) 


 














Assessment and Plan


Assessmemt and Plan


Problems


Medical Problems:


(1) Acute pancreatitis


Status: Acute  





(2) Cholelithiasis


Status: Acute  











Comment


Review of Relevant


I have reviewed the following items josy (where applicable) has been applied.


Medications:





Current Medications








 Medications


  (Trade)  Dose


 Ordered  Sig/Yee


 Route


 PRN Reason  Start Time


 Stop Time Status Last Admin


Dose Admin


 


 Amino Acids/


 Glycerin/


 Electrolytes  1,000 ml @ 


 80 mls/hr  [id number]


 IV


   7/26/20 10:15


    7/27/20 04:49














Justicifation of Admission Dx:


Justifications for Admission:


Justification of Admission Dx:  Yes











JACINTO MIRANDA MD                    Jul 27, 2020 09:30

## 2020-07-28 VITALS — DIASTOLIC BLOOD PRESSURE: 71 MMHG | SYSTOLIC BLOOD PRESSURE: 111 MMHG

## 2020-07-28 VITALS — DIASTOLIC BLOOD PRESSURE: 73 MMHG | SYSTOLIC BLOOD PRESSURE: 127 MMHG

## 2020-07-28 VITALS — SYSTOLIC BLOOD PRESSURE: 121 MMHG | DIASTOLIC BLOOD PRESSURE: 72 MMHG

## 2020-07-28 VITALS — DIASTOLIC BLOOD PRESSURE: 85 MMHG | SYSTOLIC BLOOD PRESSURE: 137 MMHG

## 2020-07-28 VITALS — DIASTOLIC BLOOD PRESSURE: 72 MMHG | SYSTOLIC BLOOD PRESSURE: 121 MMHG

## 2020-07-28 VITALS — SYSTOLIC BLOOD PRESSURE: 142 MMHG | DIASTOLIC BLOOD PRESSURE: 81 MMHG

## 2020-07-28 LAB
ALBUMIN SERPL-MCNC: 1.2 G/DL (ref 3.4–5)
ALBUMIN/GLOB SERPL: 0.3 {RATIO} (ref 1–1.7)
ALP SERPL-CCNC: 119 U/L (ref 46–116)
ALT SERPL-CCNC: 14 U/L (ref 14–59)
ANION GAP SERPL CALC-SCNC: 2 MMOL/L (ref 6–14)
AST SERPL-CCNC: 15 U/L (ref 15–37)
BASOPHILS # BLD AUTO: 0.1 X10^3/UL (ref 0–0.2)
BASOPHILS NFR BLD: 1 % (ref 0–3)
BILIRUB SERPL-MCNC: 0.5 MG/DL (ref 0.2–1)
BUN SERPL-MCNC: 11 MG/DL (ref 7–20)
BUN/CREAT SERPL: 18 (ref 6–20)
CALCIUM SERPL-MCNC: 11.4 MG/DL (ref 8.5–10.1)
CHLORIDE SERPL-SCNC: 102 MMOL/L (ref 98–107)
CK SERPL-CCNC: < 7 U/L (ref 26–192)
CO2 SERPL-SCNC: 30 MMOL/L (ref 21–32)
CREAT SERPL-MCNC: 0.6 MG/DL (ref 0.6–1)
EOSINOPHIL NFR BLD: 0.3 X10^3/UL (ref 0–0.7)
EOSINOPHIL NFR BLD: 2 % (ref 0–3)
ERYTHROCYTE [DISTWIDTH] IN BLOOD BY AUTOMATED COUNT: 15.8 % (ref 11.5–14.5)
GFR SERPLBLD BASED ON 1.73 SQ M-ARVRAT: 106.3 ML/MIN
GLOBULIN SER-MCNC: 4.3 G/DL (ref 2.2–3.8)
GLUCOSE SERPL-MCNC: 115 MG/DL (ref 70–99)
HCT VFR BLD CALC: 24.4 % (ref 36–47)
HGB BLD-MCNC: 8.2 G/DL (ref 12–15.5)
LYMPHOCYTES # BLD: 1.6 X10^3/UL (ref 1–4.8)
LYMPHOCYTES NFR BLD AUTO: 10 % (ref 24–48)
MCH RBC QN AUTO: 28 PG (ref 25–35)
MCHC RBC AUTO-ENTMCNC: 34 G/DL (ref 31–37)
MCV RBC AUTO: 84 FL (ref 79–100)
MONO #: 0.7 X10^3/UL (ref 0–1.1)
MONOCYTES NFR BLD: 4 % (ref 0–9)
NEUT #: 13.9 X10^3/UL (ref 1.8–7.7)
NEUTROPHILS NFR BLD AUTO: 84 % (ref 31–73)
PLATELET # BLD AUTO: 576 X10^3/UL (ref 140–400)
POTASSIUM SERPL-SCNC: 4.6 MMOL/L (ref 3.5–5.1)
PROT SERPL-MCNC: 5.5 G/DL (ref 6.4–8.2)
RBC # BLD AUTO: 2.9 X10^6/UL (ref 3.5–5.4)
SODIUM SERPL-SCNC: 134 MMOL/L (ref 136–145)
WBC # BLD AUTO: 16.6 X10^3/UL (ref 4–11)

## 2020-07-28 RX ADMIN — ACETYLCYSTEINE SCH MG: 200 INHALANT RESPIRATORY (INHALATION) at 08:00

## 2020-07-28 RX ADMIN — IPRATROPIUM BROMIDE AND ALBUTEROL SULFATE SCH ML: .5; 3 SOLUTION RESPIRATORY (INHALATION) at 04:20

## 2020-07-28 RX ADMIN — INSULIN LISPRO SCH UNITS: 100 INJECTION, SOLUTION INTRAVENOUS; SUBCUTANEOUS at 12:00

## 2020-07-28 RX ADMIN — INSULIN LISPRO SCH UNITS: 100 INJECTION, SOLUTION INTRAVENOUS; SUBCUTANEOUS at 17:52

## 2020-07-28 RX ADMIN — BACITRACIN SCH MLS/HR: 5000 INJECTION, POWDER, FOR SOLUTION INTRAMUSCULAR at 20:30

## 2020-07-28 RX ADMIN — FENTANYL CITRATE PRN MCG: 50 INJECTION INTRAMUSCULAR; INTRAVENOUS at 13:01

## 2020-07-28 RX ADMIN — GLYCERIN, ISOLEUCINE, LEUCINE, LYSINE, METHIONINE, PHENYLALANINE, THREONINE, TRYPTOPHAN, VALINE, ALANINE, GLYCINE, ARGININE, HISTIDINE, PROLINE, SERINE, CYSTEINE, SODIUM ACETATE, MAGNESIUM ACETATE, CALCIUM ACETATE, SODIUM CHLORIDE, POTASSIUM CHLORIDE, PHOSPHORIC ACID, AND POTASSIUM METABISULFITE SCH MLS/HR
3; .21; .27; .22; .16; .17; .12; .046; .2; .21; .42; .29; .085; .34; .18; .014; .2; .054; .026; .12; .15; .041 INJECTION INTRAVENOUS at 08:20

## 2020-07-28 RX ADMIN — CEFTAZIDIME, AVIBACTAM SCH MLS/HR: 2; .5 POWDER, FOR SOLUTION INTRAVENOUS at 16:31

## 2020-07-28 RX ADMIN — IPRATROPIUM BROMIDE AND ALBUTEROL SULFATE SCH ML: .5; 3 SOLUTION RESPIRATORY (INHALATION) at 12:00

## 2020-07-28 RX ADMIN — IPRATROPIUM BROMIDE AND ALBUTEROL SULFATE SCH ML: .5; 3 SOLUTION RESPIRATORY (INHALATION) at 15:14

## 2020-07-28 RX ADMIN — INSULIN LISPRO SCH UNITS: 100 INJECTION, SOLUTION INTRAVENOUS; SUBCUTANEOUS at 00:00

## 2020-07-28 RX ADMIN — IPRATROPIUM BROMIDE AND ALBUTEROL SULFATE SCH ML: .5; 3 SOLUTION RESPIRATORY (INHALATION) at 08:09

## 2020-07-28 RX ADMIN — IPRATROPIUM BROMIDE AND ALBUTEROL SULFATE SCH ML: .5; 3 SOLUTION RESPIRATORY (INHALATION) at 23:35

## 2020-07-28 RX ADMIN — IPRATROPIUM BROMIDE AND ALBUTEROL SULFATE SCH ML: .5; 3 SOLUTION RESPIRATORY (INHALATION) at 19:48

## 2020-07-28 RX ADMIN — DEXTROSE SCH MLS/HR: 50 INJECTION, SOLUTION INTRAVENOUS at 08:22

## 2020-07-28 RX ADMIN — GLYCERIN, ISOLEUCINE, LEUCINE, LYSINE, METHIONINE, PHENYLALANINE, THREONINE, TRYPTOPHAN, VALINE, ALANINE, GLYCINE, ARGININE, HISTIDINE, PROLINE, SERINE, CYSTEINE, SODIUM ACETATE, MAGNESIUM ACETATE, CALCIUM ACETATE, SODIUM CHLORIDE, POTASSIUM CHLORIDE, PHOSPHORIC ACID, AND POTASSIUM METABISULFITE SCH MLS/HR
3; .21; .27; .22; .16; .17; .12; .046; .2; .21; .42; .29; .085; .34; .18; .014; .2; .054; .026; .12; .15; .041 INJECTION INTRAVENOUS at 21:51

## 2020-07-28 RX ADMIN — DAPTOMYCIN SCH MLS/HR: 500 INJECTION, POWDER, LYOPHILIZED, FOR SOLUTION INTRAVENOUS at 10:10

## 2020-07-28 RX ADMIN — ACETYLCYSTEINE SCH MG: 200 INHALANT RESPIRATORY (INHALATION) at 19:48

## 2020-07-28 RX ADMIN — PANTOPRAZOLE SODIUM SCH MG: 40 INJECTION, POWDER, FOR SOLUTION INTRAVENOUS at 08:21

## 2020-07-28 RX ADMIN — CEFTAZIDIME, AVIBACTAM SCH MLS/HR: 2; .5 POWDER, FOR SOLUTION INTRAVENOUS at 06:21

## 2020-07-28 RX ADMIN — FENTANYL SCH PATCH: 12.5 PATCH TRANSDERMAL at 08:21

## 2020-07-28 RX ADMIN — INSULIN LISPRO SCH UNITS: 100 INJECTION, SOLUTION INTRAVENOUS; SUBCUTANEOUS at 06:00

## 2020-07-28 RX ADMIN — FENTANYL CITRATE PRN MCG: 50 INJECTION INTRAMUSCULAR; INTRAVENOUS at 21:56

## 2020-07-28 RX ADMIN — ENOXAPARIN SODIUM SCH MG: 40 INJECTION SUBCUTANEOUS at 08:22

## 2020-07-28 RX ADMIN — FENTANYL CITRATE PRN MCG: 50 INJECTION INTRAMUSCULAR; INTRAVENOUS at 06:02

## 2020-07-28 RX ADMIN — CEFTAZIDIME, AVIBACTAM SCH MLS/HR: 2; .5 POWDER, FOR SOLUTION INTRAVENOUS at 21:52

## 2020-07-28 NOTE — RAD
KUB

 

History: Reason: check GJ tube w/ contrast for placement for feeding 20 cc

Omnipaque 300 was / Spl. Instructions:  / History: 

 

Technique: Supine view the abdomen after injection of gastrojejunostomy 

tube with contrast.

 

Comparison: June 30, 2020 radiograph. CT July 21, 2020

 

Findings:

Gastrojejunostomy tube looped on itself within the duodenum with tip in 

the proximal jejunum. After injection of gastrojejunostomy tube with 

contrast the contrast opacifies left mid jejunal bowel loop. No evidence 

of contrast extravasation. Right upper and mid drains noted.

 

 

Impression: 

1.  Gastrojejunostomy tube looped within the duodenum with tip in the 

proximal jejunum. No evidence of contrast extravasation to suggest leak.

 

Electronically signed by: Torrey Coyle DO (7/28/2020 6:18 PM) SOPHY

## 2020-07-28 NOTE — PDOC
PROGRESS NOTES


Subjective


Subjective


pt awakens





Objective


Objective





Vital Signs








  Date Time  Temp Pulse Resp B/P (MAP) Pulse Ox O2 Delivery O2 Flow Rate FiO2


 


7/28/20 08:21   31  95 Nasal Cannula 2.0 


 


7/28/20 03:00 99.1 122  121/72 (88)    





 99.1       














Intake and Output 


 


 7/28/20





 07:00


 


Intake Total 2387 ml


 


Output Total 2585 ml


 


Balance -198 ml


 


 


 


IV Total 2387 ml


 


Output Urine Total 1450 ml


 


Drainage Total 1135 ml











Physical Exam


Abdomen:  Soft (drains in place,  wound vac in place)





Assessment


Assessment


Problems


Medical Problems:


(1) Acute pancreatitis


Status: Acute  





(2) Cholelithiasis


Status: Acute  











Plan


Plan of Care


Nurse states interest in starting tube feeds;  reviewed with Dr Flores;  will ask

IR to confirm GJ tube in place





Comment


Review of Relevant


I have reviewed the following items josy (where applicable) has been applied.


Labs





Laboratory Tests








Test


 7/26/20


11:28 7/26/20


17:39 7/27/20


00:21 7/27/20


04:10


 


Glucose (Fingerstick)


 144 mg/dL


(70-99) 117 mg/dL


(70-99) 114 mg/dL


(70-99) 





 


White Blood Count


 


 


 


 12.1 x10^3/uL


(4.0-11.0)


 


Red Blood Count


 


 


 


 2.71 x10^6/uL


(3.50-5.40)


 


Hemoglobin


 


 


 


 7.7 g/dL


(12.0-15.5)


 


Hematocrit


 


 


 


 23.2 %


(36.0-47.0)


 


Mean Corpuscular Volume    85 fL () 


 


Mean Corpuscular Hemoglobin    28 pg (25-35) 


 


Mean Corpuscular Hemoglobin


Concent 


 


 


 33 g/dL


(31-37)


 


Red Cell Distribution Width


 


 


 


 16.0 %


(11.5-14.5)


 


Platelet Count


 


 


 


 563 x10^3/uL


(140-400)


 


Neutrophils (%) (Auto)    78 % (31-73) 


 


Lymphocytes (%) (Auto)    12 % (24-48) 


 


Monocytes (%) (Auto)    8 % (0-9) 


 


Eosinophils (%) (Auto)    2 % (0-3) 


 


Basophils (%) (Auto)    1 % (0-3) 


 


Neutrophils # (Auto)


 


 


 


 9.4 x10^3/uL


(1.8-7.7)


 


Lymphocytes # (Auto)


 


 


 


 1.4 x10^3/uL


(1.0-4.8)


 


Monocytes # (Auto)


 


 


 


 0.9 x10^3/uL


(0.0-1.1)


 


Eosinophils # (Auto)


 


 


 


 0.3 x10^3/uL


(0.0-0.7)


 


Basophils # (Auto)


 


 


 


 0.1 x10^3/uL


(0.0-0.2)


 


Test


 7/27/20


04:15 7/27/20


06:10 7/27/20


12:46 7/27/20


17:29


 


Sodium Level


 135 mmol/L


(136-145) 


 


 





 


Potassium Level


 4.6 mmol/L


(3.5-5.1) 


 


 





 


Chloride Level


 103 mmol/L


() 


 


 





 


Carbon Dioxide Level


 25 mmol/L


(21-32) 


 


 





 


Anion Gap 7 (6-14)    


 


Blood Urea Nitrogen


 16 mg/dL


(7-20) 


 


 





 


Creatinine


 0.7 mg/dL


(0.6-1.0) 


 


 





 


Estimated GFR


(Cockcroft-Gault) 88.9 


 


 


 





 


BUN/Creatinine Ratio 23 (6-20)    


 


Glucose Level


 114 mg/dL


(70-99) 


 


 





 


Calcium Level


 10.9 mg/dL


(8.5-10.1) 


 


 





 


Total Bilirubin


 0.5 mg/dL


(0.2-1.0) 


 


 





 


Aspartate Amino Transf


(AST/SGOT) 18 U/L (15-37) 


 


 


 





 


Alanine Aminotransferase


(ALT/SGPT) 13 U/L (14-59) 


 


 


 





 


Alkaline Phosphatase


 123 U/L


() 


 


 





 


Total Protein


 5.4 g/dL


(6.4-8.2) 


 


 





 


Albumin


 1.1 g/dL


(3.4-5.0) 


 


 





 


Albumin/Globulin Ratio 0.3 (1.0-1.7)    


 


Glucose (Fingerstick)


 


 116 mg/dL


(70-99) 141 mg/dL


(70-99) 135 mg/dL


(70-99)


 


Test


 7/27/20


23:53 7/28/20


05:45 7/28/20


06:23 





 


Glucose (Fingerstick)


 106 mg/dL


(70-99) 


 113 mg/dL


(70-99) 





 


White Blood Count


 


 16.6 x10^3/uL


(4.0-11.0) 


 





 


Red Blood Count


 


 2.90 x10^6/uL


(3.50-5.40) 


 





 


Hemoglobin


 


 8.2 g/dL


(12.0-15.5) 


 





 


Hematocrit


 


 24.4 %


(36.0-47.0) 


 





 


Mean Corpuscular Volume  84 fL ()   


 


Mean Corpuscular Hemoglobin  28 pg (25-35)   


 


Mean Corpuscular Hemoglobin


Concent 


 34 g/dL


(31-37) 


 





 


Red Cell Distribution Width


 


 15.8 %


(11.5-14.5) 


 





 


Platelet Count


 


 576 x10^3/uL


(140-400) 


 





 


Neutrophils (%) (Auto)  84 % (31-73)   


 


Lymphocytes (%) (Auto)  10 % (24-48)   


 


Monocytes (%) (Auto)  4 % (0-9)   


 


Eosinophils (%) (Auto)  2 % (0-3)   


 


Basophils (%) (Auto)  1 % (0-3)   


 


Neutrophils # (Auto)


 


 13.9 x10^3/uL


(1.8-7.7) 


 





 


Lymphocytes # (Auto)


 


 1.6 x10^3/uL


(1.0-4.8) 


 





 


Monocytes # (Auto)


 


 0.7 x10^3/uL


(0.0-1.1) 


 





 


Eosinophils # (Auto)


 


 0.3 x10^3/uL


(0.0-0.7) 


 





 


Basophils # (Auto)


 


 0.1 x10^3/uL


(0.0-0.2) 


 





 


Sodium Level


 


 134 mmol/L


(136-145) 


 





 


Potassium Level


 


 4.6 mmol/L


(3.5-5.1) 


 





 


Chloride Level


 


 102 mmol/L


() 


 





 


Carbon Dioxide Level


 


 30 mmol/L


(21-32) 


 





 


Anion Gap  2 (6-14)   


 


Blood Urea Nitrogen


 


 11 mg/dL


(7-20) 


 





 


Creatinine


 


 0.6 mg/dL


(0.6-1.0) 


 





 


Estimated GFR


(Cockcroft-Gault) 


 106.3 


 


 





 


BUN/Creatinine Ratio  18 (6-20)   


 


Glucose Level


 


 115 mg/dL


(70-99) 


 





 


Calcium Level


 


 11.4 mg/dL


(8.5-10.1) 


 





 


Total Bilirubin


 


 0.5 mg/dL


(0.2-1.0) 


 





 


Aspartate Amino Transf


(AST/SGOT) 


 15 U/L (15-37) 


 


 





 


Alanine Aminotransferase


(ALT/SGPT) 


 14 U/L (14-59) 


 


 





 


Alkaline Phosphatase


 


 119 U/L


() 


 





 


Creatine Kinase


 


 < 7 U/L


() 


 





 


Total Protein


 


 5.5 g/dL


(6.4-8.2) 


 





 


Albumin


 


 1.2 g/dL


(3.4-5.0) 


 





 


Albumin/Globulin Ratio  0.3 (1.0-1.7)   








Laboratory Tests








Test


 7/27/20


12:46 7/27/20


17:29 7/27/20


23:53 7/28/20


05:45


 


Glucose (Fingerstick)


 141 mg/dL


(70-99) 135 mg/dL


(70-99) 106 mg/dL


(70-99) 





 


White Blood Count


 


 


 


 16.6 x10^3/uL


(4.0-11.0)


 


Red Blood Count


 


 


 


 2.90 x10^6/uL


(3.50-5.40)


 


Hemoglobin


 


 


 


 8.2 g/dL


(12.0-15.5)


 


Hematocrit


 


 


 


 24.4 %


(36.0-47.0)


 


Mean Corpuscular Volume    84 fL () 


 


Mean Corpuscular Hemoglobin    28 pg (25-35) 


 


Mean Corpuscular Hemoglobin


Concent 


 


 


 34 g/dL


(31-37)


 


Red Cell Distribution Width


 


 


 


 15.8 %


(11.5-14.5)


 


Platelet Count


 


 


 


 576 x10^3/uL


(140-400)


 


Neutrophils (%) (Auto)    84 % (31-73) 


 


Lymphocytes (%) (Auto)    10 % (24-48) 


 


Monocytes (%) (Auto)    4 % (0-9) 


 


Eosinophils (%) (Auto)    2 % (0-3) 


 


Basophils (%) (Auto)    1 % (0-3) 


 


Neutrophils # (Auto)


 


 


 


 13.9 x10^3/uL


(1.8-7.7)


 


Lymphocytes # (Auto)


 


 


 


 1.6 x10^3/uL


(1.0-4.8)


 


Monocytes # (Auto)


 


 


 


 0.7 x10^3/uL


(0.0-1.1)


 


Eosinophils # (Auto)


 


 


 


 0.3 x10^3/uL


(0.0-0.7)


 


Basophils # (Auto)


 


 


 


 0.1 x10^3/uL


(0.0-0.2)


 


Sodium Level


 


 


 


 134 mmol/L


(136-145)


 


Potassium Level


 


 


 


 4.6 mmol/L


(3.5-5.1)


 


Chloride Level


 


 


 


 102 mmol/L


()


 


Carbon Dioxide Level


 


 


 


 30 mmol/L


(21-32)


 


Anion Gap    2 (6-14) 


 


Blood Urea Nitrogen


 


 


 


 11 mg/dL


(7-20)


 


Creatinine


 


 


 


 0.6 mg/dL


(0.6-1.0)


 


Estimated GFR


(Cockcroft-Gault) 


 


 


 106.3 





 


BUN/Creatinine Ratio    18 (6-20) 


 


Glucose Level


 


 


 


 115 mg/dL


(70-99)


 


Calcium Level


 


 


 


 11.4 mg/dL


(8.5-10.1)


 


Total Bilirubin


 


 


 


 0.5 mg/dL


(0.2-1.0)


 


Aspartate Amino Transf


(AST/SGOT) 


 


 


 15 U/L (15-37) 





 


Alanine Aminotransferase


(ALT/SGPT) 


 


 


 14 U/L (14-59) 





 


Alkaline Phosphatase


 


 


 


 119 U/L


()


 


Creatine Kinase


 


 


 


 < 7 U/L


()


 


Total Protein


 


 


 


 5.5 g/dL


(6.4-8.2)


 


Albumin


 


 


 


 1.2 g/dL


(3.4-5.0)


 


Albumin/Globulin Ratio    0.3 (1.0-1.7) 


 


Test


 7/28/20


06:23 


 


 





 


Glucose (Fingerstick)


 113 mg/dL


(70-99) 


 


 











Microbiology


7/26/20 Gram Stain - Final, Resulted


          


7/26/20 Aerobic Culture, Resulted


          Pending


7/26/20 Blood Culture - Preliminary, Resulted


          NO GROWTH AFTER 1 DAY


7/21/20 Gram Stain - Final, Complete


          


7/21/20 Aerobic and Anaerobic Culture - Final, Complete


          


7/19/20 Gram Stain Evaluation - Final, Complete


          


7/19/20 Respiratory Culture - Final, Complete


          


7/19/20 Antimicrobic Susceptibility - Final, Complete


          


6/30/20 Gram Stain - Final, Complete


          


6/30/20 Aerobic and Anaerobic Culture - Final, Complete


          


6/30/20 Antimicrobic Susceptibility - Final, Complete


          


6/7/20 Urine Culture - Final, Complete


Medications





Current Medications


Sodium Chloride 1,000 ml @  1,000 mls/hr Q1H IV  Last administered on 3/16/20at 

03:00;  Start 3/16/20 at 03:00;  Stop 3/16/20 at 03:59;  Status DC


Ondansetron HCl (Zofran) 4 mg 1X  ONCE IVP  Last administered on 3/16/20at 

03:27;  Start 3/16/20 at 03:00;  Stop 3/16/20 at 03:01;  Status DC


Morphine Sulfate (Morphine Sulfate) 4 mg 1X  ONCE IV ;  Start 3/16/20 at 03:00; 

Stop 3/16/20 at 03:01;  Status Cancel


Ketorolac Tromethamine (Toradol 30mg Vial) 30 mg 1X  ONCE IV  Last administered 

on 3/16/20at 02:54;  Start 3/16/20 at 03:00;  Stop 3/16/20 at 03:01;  Status DC


Fentanyl Citrate (Fentanyl 2ml Vial) 25 mcg 1X  ONCE IVP  Last administered on 

3/16/20at 03:23;  Start 3/16/20 at 03:30;  Stop 3/16/20 at 03:31;  Status DC


Fentanyl Citrate (Fentanyl 2ml Vial) 100 mcg STK-MED ONCE .ROUTE ;  Start 

3/16/20 at 03:18;  Stop 3/16/20 at 03:18;  Status DC


Iohexol (Omnipaque 350 Mg/ml) 90 ml 1X  ONCE IV  Last administered on 3/16/20at 

03:25;  Start 3/16/20 at 03:30;  Stop 3/16/20 at 03:31;  Status DC


Info (CONTRAST GIVEN -- Rx MONITORING) 1 each PRN DAILY  PRN MC SEE COMMENTS;  

Start 3/16/20 at 03:30;  Stop 3/18/20 at 03:29;  Status DC


Hydromorphone HCl (Dilaudid) 0.5 mg 1X  ONCE IV  Last administered on 3/16/20at 

03:55;  Start 3/16/20 at 04:30;  Stop 3/16/20 at 04:32;  Status DC


Ondansetron HCl (Zofran) 4 mg PRN Q8HRS  PRN IV NAUSEA/VOMITING 1ST CHOICE;  

Start 3/16/20 at 05:00;  Stop 3/16/20 at 09:27;  Status DC


Morphine Sulfate (Morphine Sulfate) 2 mg PRN Q2HR  PRN IV SEVERE PAIN 7-10 Last 

administered on 3/17/20at 12:26;  Start 3/16/20 at 05:00;  Stop 3/17/20 at 

14:15;  Status DC


Sodium Chloride 1,000 ml @  125 mls/hr Q8H IV  Last administered on 3/16/20at 

20:56;  Start 3/16/20 at 05:00;  Stop 3/17/20 at 04:59;  Status DC


Hydromorphone HCl (Dilaudid) 0.5 mg PRN Q3HRS  PRN IV SEVERE PAIN 7-10 Last 

administered on 3/17/20at 10:06;  Start 3/16/20 at 05:00;  Stop 3/17/20 at 

12:01;  Status DC


Piperacillin Sod/ Tazobactam Sod 4.5 gm/Sodium Chloride 100 ml @  200 mls/hr 1X 

ONCE IV  Last administered on 3/16/20at 05:44;  Start 3/16/20 at 06:00;  Stop 

3/16/20 at 06:29;  Status DC


Ondansetron HCl (Zofran) 4 mg PRN Q4HRS  PRN IV NAUSEA/VOMITING 1ST CHOICE Last 

administered on 7/27/20at 00:56;  Start 3/16/20 at 09:30


Insulin Human Lispro (HumaLOG) 0-9 UNITS Q6HRS SQ  Last administered on 

7/24/20at 12:26;  Start 3/16/20 at 09:30


Dextrose (Dextrose 50%-Water Syringe) 12.5 gm PRN Q15MIN  PRN IV SEE COMMENTS;  

Start 3/16/20 at 09:30


Pantoprazole Sodium (PROTONIX VIAL for IV PUSH) 40 mg DAILYAC IVP  Last 

administered on 7/28/20at 08:21;  Start 3/16/20 at 11:30


Prochlorperazine Edisylate (Compazine) 10 mg PRN Q6HRS  PRN IV NAUSEA/VOMITING, 

2nd CHOICE Last administered on 7/27/20at 09:49;  Start 3/16/20 at 17:45


Atenolol (Tenormin) 100 mg DAILY PO ;  Start 3/17/20 at 09:00;  Stop 3/16/20 at 

20:08;  Status DC


Metoprolol Tartrate (Lopressor Vial) 2.5 mg Q6HRS IVP  Last administered on 

3/17/20at 05:51;  Start 3/16/20 at 20:15;  Stop 3/17/20 at 10:02;  Status DC


Metoprolol Tartrate (Lopressor Vial) 5 mg Q6HRS IVP  Last administered on 

3/26/20at 00:12;  Start 3/17/20 at 10:15;  Stop 3/28/20 at 08:48;  Status DC


Hydromorphone HCl (Dilaudid) 1 mg PRN Q3HRS  PRN IV SEVERE PAIN 7-10 Last 

administered on 3/23/20at 05:13;  Start 3/17/20 at 12:00;  Stop 3/31/20 at 

00:25;  Status DC


Lidocaine HCl (Buffered Lidocaine 1%) 3 ml STK-MED ONCE .ROUTE ;  Start 3/17/20 

at 12:55;  Stop 3/17/20 at 12:56;  Status DC


Albumin Human 500 ml @  125 mls/hr 1X  ONCE IV  Last administered on 3/17/20at 

14:33;  Start 3/17/20 at 14:30;  Stop 3/17/20 at 18:32;  Status DC


Norepinephrine Bitartrate 8 mg/ Dextrose 258 ml @  17.299 mls/ hr CONT  PRN IV 

PER PROTOCOL Last administered on 4/14/20at 12:48;  Start 3/17/20 at 15:30;  

Stop 4/17/20 at 09:19;  Status DC


Sodium Chloride 1,000 ml @  125 mls/hr Q8H IV  Last administered on 3/17/20at 

21:04;  Start 3/17/20 at 16:00;  Stop 3/18/20 at 02:42;  Status DC


Albumin Human 500 ml @  125 mls/hr PRN BID  PRN IV After every 2L NSS & BP < 

90mm Last administered on 6/30/20at 16:06;  Start 3/17/20 at 16:00;  Stop 7/3/20

at 09:30;  Status DC


Iohexol (Omnipaque 300 Mg/ml) 60 ml 1X  ONCE IV  Last administered on 3/17/20at 

17:20;  Start 3/17/20 at 17:00;  Stop 3/17/20 at 17:01;  Status DC


Info (CONTRAST GIVEN -- Rx MONITORING) 1 each PRN DAILY  PRN MC SEE COMMENTS;  

Start 3/17/20 at 17:00;  Stop 3/19/20 at 16:59;  Status DC


Meropenem 1 gm/ Sodium Chloride 100 ml @  200 mls/hr Q8HRS IV  Last administered

on 3/18/20at 05:45;  Start 3/17/20 at 20:00;  Stop 3/18/20 at 08:48;  Status DC


Furosemide (Lasix) 40 mg 1X  ONCE IVP  Last administered on 3/17/20at 22:12;  

Start 3/17/20 at 22:30;  Stop 3/17/20 at 22:31;  Status DC


Calcium Chloride 1000 mg/Sodium Chloride 110 ml @  220 mls/hr 1X  ONCE IV  Last 

administered on 3/17/20at 22:11;  Start 3/17/20 at 22:30;  Stop 3/17/20 at 

22:59;  Status DC


Albuterol Sulfate (Ventolin Neb Soln) 2.5 mg 1X  ONCE NEB  Last administered on 

3/18/20at 00:56;  Start 3/17/20 at 22:30;  Stop 3/17/20 at 22:31;  Status DC


Insulin Human Regular (HumuLIN R VIAL) 5 unit 1X  ONCE IV  Last administered on 

3/17/20at 22:14;  Start 3/17/20 at 22:30;  Stop 3/17/20 at 22:31;  Status DC


Magnesium Sulfate 50 ml @ 25 mls/hr 1X  ONCE IV  Last administered on 3/18/20at 

02:57;  Start 3/18/20 at 03:00;  Stop 3/18/20 at 04:59;  Status DC


Calcium Gluconate 1000 mg/Sodium Chloride 110 ml @  220 mls/hr 1X  ONCE IV  Last

administered on 3/18/20at 02:46;  Start 3/18/20 at 03:00;  Stop 3/18/20 at 

03:29;  Status DC


Sodium Chloride 1,000 ml @  200 mls/hr Q5H IV  Last administered on 3/18/20at 

02:46;  Start 3/18/20 at 03:00;  Stop 3/18/20 at 10:21;  Status DC


Calcium Gluconate 1000 mg/Sodium Chloride 110 ml @  220 mls/hr 1X  ONCE IV  Last

administered on 3/18/20at 03:21;  Start 3/18/20 at 03:30;  Stop 3/18/20 at 

03:59;  Status DC


Sodium Bicarbonate 50 meq/Sodium Chloride 1,050 ml @  75 mls/hr Q14H IV  Last 

administered on 3/22/20at 21:10;  Start 3/18/20 at 07:30;  Stop 3/23/20 at 10:28

;  Status DC


Calcium Gluconate 2000 mg/Sodium Chloride 120 ml @  220 mls/hr 1X  ONCE IV  Last

administered on 3/18/20at 09:05;  Start 3/18/20 at 07:30;  Stop 3/18/20 at 

08:02;  Status DC


Lidocaine HCl (Xylocaine-Mpf 1% 2ml Vial) 2 ml STK-MED ONCE .ROUTE ;  Start 

3/18/20 at 08:47;  Stop 3/18/20 at 08:47;  Status DC


Meropenem 500 mg/ Sodium Chloride 50 ml @  100 mls/hr Q12HR IV  Last 

administered on 3/23/20at 21:01;  Start 3/18/20 at 18:00;  Stop 3/24/20 at 

07:58;  Status DC


Lidocaine HCl (Buffered Lidocaine 1%) 3 ml STK-MED ONCE .ROUTE ;  Start 3/18/20 

at 09:46;  Stop 3/18/20 at 09:46;  Status DC


Lidocaine HCl (Buffered Lidocaine 1%) 6 ml 1X  ONCE INJ  Last administered on 

3/18/20at 10:26;  Start 3/18/20 at 10:15;  Stop 3/18/20 at 10:16;  Status DC


Info (Tpn Per Pharmacy) 1 each PRN DAILY  PRN MC SEE COMMENTS Last administered 

on 7/26/20at 08:31;  Start 3/18/20 at 12:00;  Stop 7/26/20 at 10:41;  Status DC


Sodium Chloride 1,000 ml @  1,000 mls/hr Q1H PRN IV hypotension;  Start 3/18/20 

at 12:07;  Stop 3/18/20 at 18:06;  Status DC


Diphenhydramine HCl (Benadryl) 25 mg 1X PRN  PRN IV ITCHING;  Start 3/18/20 at 

12:15;  Stop 3/19/20 at 12:14;  Status DC


Diphenhydramine HCl (Benadryl) 25 mg 1X PRN  PRN IV ITCHING;  Start 3/18/20 at 

12:15;  Stop 3/19/20 at 12:14;  Status DC


Sodium Chloride 1,000 ml @  400 mls/hr Q2H30M PRN IV PATENCY;  Start 3/18/20 at 

12:07;  Stop 3/19/20 at 00:06;  Status DC


Info (PHARMACY MONITORING -- do not chart) 1 each PRN DAILY  PRN MC SEE 

COMMENTS;  Start 3/18/20 at 12:15;  Stop 3/20/20 at 08:13;  Status DC


Sodium Chloride 90 meq/Calcium Gluconate 10 meq/ Multivitamins 10 ml/Chromium/ 

Copper/Manganese/ Seleni/Zn 1 ml/ Total Parenteral Nutrition/Amino 

Acids/Dextrose/ Fat Emulsion Intravenous 55.005 ml  @ 2.292 mls/hr TPN  CONT IV 

;  Start 3/18/20 at 22:00;  Stop 3/18/20 at 12:33;  Status DC


Info (Tpn Per Pharmacy) 1 each PRN DAILY  PRN MC SEE COMMENTS;  Start 3/18/20 at

12:30;  Status UNV


Sodium Chloride 90 meq/Calcium Gluconate 10 meq/ Multivitamins 10 ml/Chromium/ 

Copper/Manganese/ Seleni/Zn 0.5 ml/ Total Parenteral Nutrition/Amino 

Acids/Dextrose/ Fat Emulsion Intravenous 1,512 ml @  63 mls/hr TPN  CONT IV  

Last administered on 3/18/20at 22:06;  Start 3/18/20 at 22:00;  Stop 3/19/20 at 

21:59;  Status DC


Calcium Carbonate/ Glycine (Tums) 500 mg PRN AFTMEALHC  PRN PO INDIGESTION;  

Start 3/18/20 at 17:45;  Stop 5/13/20 at 10:25;  Status DC


Calcium Gluconate (Calcium Gluconate) 2,000 mg 1X  ONCE IVP  Last administered 

on 3/19/20at 02:19;  Start 3/19/20 at 02:15;  Stop 3/19/20 at 02:16;  Status DC


Calcium Chloride 3000 mg/Sodium Chloride 1,030 ml @  50 mls/hr [id number] IV  Last 

administered on 3/21/20at 02:17;  Start 3/19/20 at 08:00;  Stop 3/21/20 at 

15:23;  Status DC


Lorazepam (Ativan Inj) 1 mg PRN Q4HRS  PRN IVP ANXIETY / AGITATION, 2nd choic 

Last administered on 4/17/20at 03:51;  Start 3/19/20 at 09:00;  Stop 4/17/20 at 

09:19;  Status DC


Sodium Chloride 1,000 ml @  1,000 mls/hr Q1H PRN IV hypotension;  Start 3/19/20 

at 08:56;  Stop 3/19/20 at 14:55;  Status DC


Albumin Human 200 ml @  200 mls/hr 1X PRN  PRN IV Hypotension;  Start 3/19/20 at

09:00;  Stop 3/19/20 at 14:59;  Status DC


Diphenhydramine HCl (Benadryl) 25 mg 1X PRN  PRN IV ITCHING;  Start 3/19/20 at 

09:00;  Stop 3/20/20 at 08:59;  Status DC


Diphenhydramine HCl (Benadryl) 25 mg 1X PRN  PRN IV ITCHING;  Start 3/19/20 at 

09:00;  Stop 3/20/20 at 08:59;  Status DC


Sodium Chloride 1,000 ml @  400 mls/hr Q2H30M PRN IV PATENCY;  Start 3/19/20 at 

08:56;  Stop 3/19/20 at 20:55;  Status DC


Info (PHARMACY MONITORING -- do not chart) 1 each PRN DAILY  PRN MC SEE 

COMMENTS;  Start 3/19/20 at 09:00;  Status UNV


Info (PHARMACY MONITORING -- do not chart) 1 each PRN DAILY  PRN MC SEE COMME

NTS;  Start 3/19/20 at 09:00;  Stop 3/20/20 at 08:13;  Status DC


Digoxin (Lanoxin) 500 mcg 1X  ONCE IV  Last administered on 3/19/20at 10:04;  

Start 3/19/20 at 10:00;  Stop 3/19/20 at 10:01;  Status DC


Digoxin (Lanoxin) 125 mcg 1X  ONCE IV  Last administered on 3/19/20at 17:10;  

Start 3/19/20 at 18:00;  Stop 3/19/20 at 18:01;  Status DC


Magnesium Sulfate 100 ml @  25 mls/hr 1X  ONCE IV  Last administered on 

3/19/20at 12:48;  Start 3/19/20 at 13:00;  Stop 3/19/20 at 16:59;  Status DC


Sodium Chloride 90 meq/Magnesium Sulfate 10 meq/ Calcium Gluconate 20 meq/ 

Multivitamins 10 ml/Chromium/ Copper/Manganese/ Seleni/Zn 0.5 ml/ Total Paren

teral Nutrition/Amino Acids/Dextrose/ Fat Emulsion Intravenous 1,512 ml @  63 

mls/hr TPN  CONT IV  Last administered on 3/19/20at 22:25;  Start 3/19/20 at 

22:00;  Stop 3/20/20 at 21:59;  Status DC


Sodium Chloride 1,000 ml @  1,000 mls/hr Q1H PRN IV hypotension;  Start 3/20/20 

at 08:05;  Stop 3/20/20 at 14:04;  Status DC


Albumin Human 200 ml @  200 mls/hr 1X  ONCE IV  Last administered on 3/20/20at 

08:57;  Start 3/20/20 at 08:15;  Stop 3/20/20 at 09:14;  Status DC


Diphenhydramine HCl (Benadryl) 25 mg 1X PRN  PRN IV ITCHING;  Start 3/20/20 at 

08:15;  Stop 3/21/20 at 08:14;  Status DC


Diphenhydramine HCl (Benadryl) 25 mg 1X PRN  PRN IV ITCHING;  Start 3/20/20 at 

08:15;  Stop 3/21/20 at 08:14;  Status DC


Sodium Chloride 1,000 ml @  400 mls/hr Q2H30M PRN IV PATENCY;  Start 3/20/20 at 

08:05;  Stop 3/20/20 at 20:04;  Status DC


Info (PHARMACY MONITORING -- do not chart) 1 each PRN DAILY  PRN MC SEE 

COMMENTS;  Start 3/20/20 at 08:15;  Stop 3/24/20 at 07:57;  Status DC


Sodium Chloride 90 meq/Potassium Chloride 15 meq/ Potassium Phosphate 10 mmol/ 

Magnesium Sulfate 10 meq/Calcium Gluconate 20 meq/ Multivitamins 10 ml/Chromium/

Copper/Manganese/ Seleni/Zn 0.5 ml/ Total Parenteral Nutrition/Amino 

Acids/Dextrose/ Fat Emulsion Intravenous 1,512 ml @  63 mls/hr TPN  CONT IV  

Last administered on 3/20/20at 21:01;  Start 3/20/20 at 22:00;  Stop 3/21/20 at 

21:59;  Status DC


Potassium Chloride/Water 100 ml @  100 mls/hr 1X  ONCE IV  Last administered on 

3/20/20at 14:09;  Start 3/20/20 at 14:00;  Stop 3/20/20 at 14:59;  Status DC


Benzocaine (Hurricaine One) 1 spray 1X  ONCE MM  Last administered on 3/20/20at 

16:38;  Start 3/20/20 at 14:30;  Stop 3/20/20 at 14:31;  Status DC


Lidocaine HCl (Glydo (Lidocaine) Jelly) 1 thomas 1X  ONCE MM  Last administered on 

3/20/20at 16:38;  Start 3/20/20 at 14:30;  Stop 3/20/20 at 14:31;  Status DC


Linezolid/Dextrose 300 ml @  300 mls/hr Q12HR IV  Last administered on 3/26/20at

21:04;  Start 3/20/20 at 20:00;  Stop 3/27/20 at 07:50;  Status DC


Acetaminophen (Tylenol) 650 mg PRN Q6HRS  PRN PO MILD PAIN / TEMP;  Start 

3/21/20 at 03:30;  Stop 3/21/20 at 03:36;  Status DC


Acetaminophen (Tylenol) 650 mg PRN Q6HRS  PRN PEG MILD PAIN / TEMP Last 

administered on 4/16/20at 19:56;  Start 3/21/20 at 03:36;  Stop 5/13/20 at 

10:25;  Status DC


Sodium Chloride 1,000 ml @  1,000 mls/hr Q1H PRN IV hypotension;  Start 3/21/20 

at 07:50;  Stop 3/21/20 at 13:49;  Status DC


Albumin Human 200 ml @  200 mls/hr 1X PRN  PRN IV Hypotension;  Start 3/21/20 at

08:00;  Stop 3/21/20 at 13:59;  Status DC


Sodium Chloride (Normal Saline Flush) 10 ml 1X PRN  PRN IV AP catheter pack;  

Start 3/21/20 at 08:00;  Stop 3/22/20 at 07:59;  Status DC


Sodium Chloride (Normal Saline Flush) 10 ml 1X PRN  PRN IV  catheter pack;  

Start 3/21/20 at 08:00;  Stop 3/22/20 at 07:59;  Status DC


Sodium Chloride 1,000 ml @  400 mls/hr Q2H30M PRN IV PATENCY;  Start 3/21/20 at 

07:50;  Stop 3/21/20 at 19:49;  Status DC


Info (PHARMACY MONITORING -- do not chart) 1 each PRN DAILY  PRN MC SEE 

COMMENTS;  Start 3/21/20 at 08:00;  Status UNV


Info (PHARMACY MONITORING -- do not chart) 1 each PRN DAILY  PRN MC SEE 

COMMENTS;  Start 3/21/20 at 08:00;  Stop 3/23/20 at 08:25;  Status DC


Sodium Chloride 90 meq/Potassium Chloride 15 meq/ Potassium Phosphate 10 mmol/ 

Magnesium Sulfate 10 meq/Calcium Gluconate 20 meq/ Multivitamins 10 ml/Chromium/

Copper/Manganese/ Seleni/Zn 0.5 ml/ Total Parenteral Nutrition/Amino 

Acids/Dextrose/ Fat Emulsion Intravenous 1,512 ml @  63 mls/hr TPN  CONT IV  

Last administered on 3/21/20at 20:57;  Start 3/21/20 at 22:00;  Stop 3/22/20 at 

21:59;  Status DC


Sodium Chloride 90 meq/Potassium Chloride 15 meq/ Potassium Phosphate 15 mmol/ 

Magnesium Sulfate 10 meq/Calcium Gluconate 20 meq/ Multivitamins 10 ml/Chromium/

Copper/Manganese/ Seleni/Zn 0.5 ml/ Total Parenteral Nutrition/Amino 

Acids/Dextrose/ Fat Emulsion Intravenous 1,512 ml @  63 mls/hr TPN  CONT IV ;  

Start 3/22/20 at 22:00;  Stop 3/22/20 at 14:16;  Status DC


Sodium Chloride 90 meq/Potassium Chloride 15 meq/ Potassium Phosphate 15 mmol/ 

Magnesium Sulfate 10 meq/Calcium Gluconate 20 meq/ Multivitamins 10 ml/Chromium/

Copper/Manganese/ Seleni/Zn 0.5 ml/ Total Parenteral Nutrition/Amino 

Acids/Dextrose/ Fat Emulsion Intravenous 1,200 ml @  50 mls/hr TPN  CONT IV ;  

Start 3/22/20 at 22:00;  Stop 3/22/20 at 14:17;  Status DC


Sodium Chloride 90 meq/Potassium Chloride 15 meq/ Potassium Phosphate 10 mmol/ 

Magnesium Sulfate 10 meq/Calcium Gluconate 20 meq/ Multivitamins 10 ml/Chromium/

Copper/Manganese/ Seleni/Zn 0.5 ml/ Total Parenteral Nutrition/Amino 

Acids/Dextrose/ Fat Emulsion Intravenous 1,200 ml @  50 mls/hr TPN  CONT IV  

Last administered on 3/22/20at 23:29;  Start 3/22/20 at 22:00;  Stop 3/23/20 at 

21:59;  Status DC


Sodium Chloride 1,000 ml @  1,000 mls/hr Q1H PRN IV hypotension;  Start 3/23/20 

at 07:28;  Stop 3/23/20 at 13:27;  Status DC


Albumin Human 200 ml @  200 mls/hr 1X  ONCE IV  Last administered on 3/23/20at 

08:51;  Start 3/23/20 at 07:30;  Stop 3/23/20 at 08:29;  Status DC


Diphenhydramine HCl (Benadryl) 25 mg 1X PRN  PRN IV ITCHING;  Start 3/23/20 at 

07:30;  Stop 3/24/20 at 07:29;  Status DC


Diphenhydramine HCl (Benadryl) 25 mg 1X PRN  PRN IV ITCHING;  Start 3/23/20 at 

07:30;  Stop 3/24/20 at 07:29;  Status DC


Sodium Chloride 1,000 ml @  400 mls/hr Q2H30M PRN IV PATENCY;  Start 3/23/20 at 

07:28;  Stop 3/23/20 at 19:27;  Status DC


Info (PHARMACY MONITORING -- do not chart) 1 each PRN DAILY  PRN MC SEE 

COMMENTS;  Start 3/23/20 at 07:30;  Stop 4/3/20 at 13:01;  Status DC


Metronidazole 100 ml @  100 mls/hr Q6HRS IV  Last administered on 4/8/20at 

06:26;  Start 3/23/20 at 08:30;  Stop 4/8/20 at 09:58;  Status DC


Micafungin Sodium 100 mg/Dextrose 100 ml @  100 mls/hr Q24H IV  Last 

administered on 4/30/20at 08:18;  Start 3/23/20 at 09:00;  Stop 4/30/20 at 

20:58;  Status DC


Propofol 0 ml @ As Directed STK-MED ONCE IV ;  Start 3/23/20 at 07:53;  Stop 

3/23/20 at 07:53;  Status DC


Etomidate (Amidate) 20 mg STK-MED ONCE IV ;  Start 3/23/20 at 07:53;  Stop 

3/23/20 at 07:54;  Status DC


Midazolam HCl (Versed) 5 mg STK-MED ONCE .ROUTE ;  Start 3/23/20 at 07:57;  Stop

3/23/20 at 07:57;  Status DC


Fentanyl Citrate 30 ml @ 0 mls/hr CONT  PRN IV SEE PROTOCOL Last administered on

4/17/20at 06:12;  Start 3/23/20 at 08:15;  Stop 4/17/20 at 09:19;  Status DC


Artificial Tears (Artificial Tears) 1 drop PRN Q1HR  PRN OU DRY EYE, 1st choice;

 Start 3/23/20 at 08:15;  Stop 4/29/20 at 05:31;  Status DC


Midazolam HCl 50 mg/Sodium Chloride 50 ml @ 0 mls/hr CONT  PRN IV SEE PROTOCOL 

Last administered on 3/26/20at 22:39;  Start 3/23/20 at 08:15;  Stop 3/28/20 at 

15:59;  Status DC


Etomidate (Amidate) 8 mg 1X  ONCE IV  Last administered on 3/23/20at 08:33;  

Start 3/23/20 at 08:30;  Stop 3/23/20 at 08:31;  Status DC


Succinylcholine Chloride (Anectine) 120 mg 1X  ONCE IV  Last administered on 

3/23/20at 08:34;  Start 3/23/20 at 08:30;  Stop 3/23/20 at 08:31;  Status DC


Midazolam HCl (Versed) 5 mg 1X  ONCE IV ;  Start 3/23/20 at 08:30;  Stop 3/23/20

at 08:31;  Status DC


Potassium Chloride 15 meq/ Bicarbonate Dialysis Soln w/ out KCl 5,007.5 ml  @ 

1,000 mls/ hr Q5H1M IV  Last administered on 3/24/20at 11:11;  Start 3/23/20 at 

12:00;  Stop 3/24/20 at 11:15;  Status DC


Potassium Chloride 15 meq/ Bicarbonate Dialysis Soln w/ out KCl 5,007.5 ml  @ 

1,000 mls/ hr Q5H1M IV  Last administered on 3/24/20at 11:12;  Start 3/23/20 at 

12:00;  Stop 3/24/20 at 11:17;  Status DC


Potassium Chloride 15 meq/ Bicarbonate Dialysis Soln w/ out KCl 5,007.5 ml  @ 

1,000 mls/ hr Q5H1M IV  Last administered on 3/24/20at 11:11;  Start 3/23/20 at 

12:00;  Stop 3/24/20 at 11:19;  Status DC


Sodium Chloride 90 meq/Potassium Chloride 15 meq/ Potassium Phosphate 10 mmol/ 

Magnesium Sulfate 10 meq/Calcium Gluconate 20 meq/ Multivitamins 10 ml/Chromium/

Copper/Manganese/ Seleni/Zn 0.5 ml/ Total Parenteral Nutrition/Amino 

Acids/Dextrose/ Fat Emulsion Intravenous 1,400 ml @  58.333 mls/ hr TPN  CONT IV

 Last administered on 3/23/20at 21:42;  Start 3/23/20 at 22:00;  Stop 3/24/20 at

21:59;  Status DC


Heparin Sodium (Porcine) (Heparin Sodium) 5,000 unit Q8HRS SQ  Last administered

on 3/28/20at 05:55;  Start 3/23/20 at 15:00;  Stop 3/28/20 at 13:28;  Status DC


Meropenem 500 mg/ Sodium Chloride 50 ml @  100 mls/hr Q6HRS IV  Last 

administered on 3/25/20at 06:00;  Start 3/24/20 at 09:00;  Stop 3/25/20 at 

07:29;  Status DC


Potassium Phosphate 20 mmol/ Sodium Chloride 106.6667 ml @  51.667 m... 1X  ONCE

IV  Last administered on 3/24/20at 11:22;  Start 3/24/20 at 10:15;  Stop 3/24/20

at 12:18;  Status DC


Acetaminophen (Tylenol Supp) 650 mg PRN Q6HRS  PRN IL MILD PAIN / TEMP > 100.3'F

Last administered on 7/25/20at 19:52;  Start 3/24/20 at 10:30


Potassium Chloride/Water 100 ml @  100 mls/hr Q1H IV  Last administered on 

3/24/20at 12:12;  Start 3/24/20 at 11:00;  Stop 3/24/20 at 12:59;  Status DC


Potassium Chloride 20 meq/ Bicarbonate Dialysis Soln w/ out KCl 5,010 ml @  

1,000 mls/hr Q5H1M IV  Last administered on 3/25/20at 08:48;  Start 3/24/20 at 

12:00;  Stop 3/25/20 at 13:03;  Status DC


Potassium Chloride 20 meq/ Bicarbonate Dialysis Soln w/ out KCl 5,010 ml @  

1,000 mls/hr Q5H1M IV  Last administered on 3/29/20at 14:52;  Start 3/24/20 at 

11:30;  Stop 3/29/20 at 19:59;  Status DC


Potassium Chloride 20 meq/ Bicarbonate Dialysis Soln w/ out KCl 5,010 ml @  

1,000 mls/hr Q5H1M IV  Last administered on 3/29/20at 14:53;  Start 3/24/20 at 

11:30;  Stop 3/29/20 at 19:59;  Status DC


Sodium Chloride 90 meq/Potassium Chloride 15 meq/ Potassium Phosphate 15 mmol/ 

Magnesium Sulfate 10 meq/Calcium Gluconate 15 meq/ Multivitamins 10 ml/Chromium/

Copper/Manganese/ Seleni/Zn 0.5 ml/ Total Parenteral Nutrition/Amino 

Acids/Dextrose/ Fat Emulsion Intravenous 1,400 ml @  58.333 mls/ hr TPN  CONT IV

 Last administered on 3/24/20at 22:17;  Start 3/24/20 at 22:00;  Stop 3/25/20 at

21:59;  Status DC


Cefepime HCl (Maxipime) 2 gm Q12HR IVP  Last administered on 4/7/20at 20:56;  

Start 3/25/20 at 09:00;  Stop 4/8/20 at 09:58;  Status DC


Daptomycin 500 mg/ Sodium Chloride 50 ml @  100 mls/hr Q48H IV  Last 

administered on 4/10/20at 09:57;  Start 3/25/20 at 08:30;  Stop 4/10/20 at 

10:07;  Status DC


Lidocaine HCl (Buffered Lidocaine 1%) 3 ml 1X  ONCE INJ  Last administered on 

3/25/20at 10:27;  Start 3/25/20 at 10:30;  Stop 3/25/20 at 10:31;  Status DC


Potassium Phosphate 20 mmol/ Sodium Chloride 106.6667 ml @  51.667 m... 1X  ONCE

IV  Last administered on 3/25/20at 12:51;  Start 3/25/20 at 13:00;  Stop 3/25/20

at 15:03;  Status DC


Sodium Chloride 90 meq/Potassium Chloride 15 meq/ Potassium Phosphate 18 mmol/ 

Magnesium Sulfate 8 meq/Calcium Gluconate 15 meq/ Multivitamins 10 ml/Chromium/ 

Copper/Manganese/ Seleni/Zn 0.5 ml/ Total Parenteral Nutrition/Amino 

Acids/Dextrose/ Fat Emulsion Intravenous 1,400 ml @  58.333 mls/ hr TPN  CONT IV

 Last administered on 3/25/20at 22:16;  Start 3/25/20 at 22:00;  Stop 3/26/20 at

21:59;  Status DC


Potassium Chloride 20 meq/ Bicarbonate Dialysis Soln w/ out KCl 5,010 ml @  

1,000 mls/hr Q5H1M IV  Last administered on 3/29/20at 14:54;  Start 3/25/20 at 

16:00;  Stop 3/29/20 at 19:59;  Status DC


Multi-Ingred Cream/Lotion/Oil/ Oint (Artificial Tears Eye Ointment) 1 thomas PRN 

Q1HR  PRN OU DRY EYE, 2nd choice Last administered on 4/13/20at 08:19;  Start 

3/25/20 at 17:30;  Stop 6/3/20 at 14:39;  Status DC


Sodium Chloride 90 meq/Potassium Chloride 15 meq/ Potassium Phosphate 18 mmol/ 

Magnesium Sulfate 8 meq/Calcium Gluconate 15 meq/ Multivitamins 10 ml/Chromium/ 

Copper/Manganese/ Seleni/Zn 0.5 ml/ Total Parenteral Nutrition/Amino 

Acids/Dextrose/ Fat Emulsion Intravenous 1,400 ml @  58.333 mls/ hr TPN  CONT IV

 Last administered on 3/26/20at 22:00;  Start 3/26/20 at 22:00;  Stop 3/27/20 at

21:59;  Status DC


Albumin Human 500 ml @  125 mls/hr 1X  ONCE IV ;  Start 3/26/20 at 14:15;  Stop 

3/26/20 at 18:14;  Status DC


Sodium Chloride 90 meq/Potassium Chloride 15 meq/ Potassium Phosphate 18 mmol/ 

Magnesium Sulfate 8 meq/Calcium Gluconate 15 meq/ Multivitamins 10 ml/Chromium/ 

Copper/Manganese/ Seleni/Zn 0.5 ml/ Insulin Human Regular 10 unit/ Total 

Parenteral Nutrition/Amino Acids/Dextrose/ Fat Emulsion Intravenous 1,400 ml @  

58.333 mls/ hr TPN  CONT IV  Last administered on 3/27/20at 21:43;  Start 3/27/

20 at 22:00;  Stop 3/28/20 at 21:59;  Status DC


Lidocaine HCl (Buffered Lidocaine 1%) 3 ml STK-MED ONCE .ROUTE ;  Start 3/25/20 

at 10:00;  Stop 3/27/20 at 13:57;  Status DC


Midazolam HCl 100 mg/Sodium Chloride 100 ml @ 7 mls/hr CONT  PRN IV SEE PROTOCOL

Last administered on 4/8/20at 15:35;  Start 3/28/20 at 16:00;  Stop 6/3/20 at 

14:38;  Status DC


Sodium Chloride 90 meq/Potassium Chloride 15 meq/ Potassium Phosphate 18 mmol/ 

Magnesium Sulfate 8 meq/Calcium Gluconate 15 meq/ Multivitamins 10 ml/Chromium/ 

Copper/Manganese/ Seleni/Zn 0.5 ml/ Insulin Human Regular 15 unit/ Total 

Parenteral Nutrition/Amino Acids/Dextrose/ Fat Emulsion Intravenous 1,400 ml @  

58.333 mls/ hr TPN  CONT IV  Last administered on 3/28/20at 20:34;  Start 

3/28/20 at 22:00;  Stop 3/29/20 at 21:59;  Status DC


Info ([facility] Electrolyte Protocol) 1 ea CONT PRN  PRN MC PER PROTOCOL;  Start 

3/29/20 at 13:15


Sodium Chloride 90 meq/Potassium Chloride 15 meq/ Potassium Phosphate 18 mmol/ 

Magnesium Sulfate 8 meq/Calcium Gluconate 15 meq/ Multivitamins 10 ml/Chromium/ 

Copper/Manganese/ Seleni/Zn 0.5 ml/ Insulin Human Regular 15 unit/ Total 

Parenteral Nutrition/Amino Acids/Dextrose/ Fat Emulsion Intravenous 1,400 ml @  

58.333 mls/ hr TPN  CONT IV  Last administered on 3/29/20at 22:05;  Start 

3/29/20 at 22:00;  Stop 3/30/20 at 21:59;  Status DC


Potassium Chloride 15 meq/ Bicarbonate Dialysis Soln w/ out KCl 5,007.5 ml  @ 

1,000 mls/ hr Q5H1M IV  Last administered on 4/1/20at 18:14;  Start 3/29/20 at 

20:00;  Stop 4/2/20 at 13:08;  Status DC


Potassium Chloride 15 meq/ Bicarbonate Dialysis Soln w/ out KCl 5,007.5 ml  @ 

1,000 mls/ hr Q5H1M IV  Last administered on 4/1/20at 18:14;  Start 3/29/20 at 

20:00;  Stop 4/2/20 at 13:08;  Status DC


Potassium Chloride 15 meq/ Bicarbonate Dialysis Soln w/ out KCl 5,007.5 ml  @ 

1,000 mls/ hr Q5H1M IV  Last administered on 4/1/20at 18:14;  Start 3/29/20 at 

20:00;  Stop 4/2/20 at 13:08;  Status DC


Iohexol (Omnipaque 240 Mg/ml) 30 ml 1X  ONCE PO  Last administered on 3/30/20at 

11:30;  Start 3/30/20 at 11:30;  Stop 3/30/20 at 11:33;  Status DC


Info (CONTRAST GIVEN -- Rx MONITORING) 1 each PRN DAILY  PRN MC SEE COMMENTS;  

Start 3/30/20 at 11:45;  Stop 4/1/20 at 11:44;  Status DC


Sodium Chloride 90 meq/Potassium Chloride 15 meq/ Potassium Phosphate 18 mmol/ 

Magnesium Sulfate 8 meq/Calcium Gluconate 15 meq/ Multivitamins 10 ml/Chromium/ 

Copper/Manganese/ Seleni/Zn 0.5 ml/ Insulin Human Regular 15 unit/ Total Pa

renteral Nutrition/Amino Acids/Dextrose/ Fat Emulsion Intravenous 1,400 ml @  

58.333 mls/ hr TPN  CONT IV  Last administered on 3/30/20at 21:47;  Start 

3/30/20 at 22:00;  Stop 3/31/20 at 21:59;  Status DC


Sodium Chloride 90 meq/Potassium Chloride 15 meq/ Potassium Phosphate 18 mmol/ 

Magnesium Sulfate 8 meq/Calcium Gluconate 15 meq/ Multivitamins 10 ml/Chromium/ 

Copper/Manganese/ Seleni/Zn 0.5 ml/ Insulin Human Regular 20 unit/ Total 

Parenteral Nutrition/Amino Acids/Dextrose/ Fat Emulsion Intravenous 1,400 ml @  

58.333 mls/ hr TPN  CONT IV  Last administered on 3/31/20at 21:36;  Start 

3/31/20 at 22:00;  Stop 4/1/20 at 21:59;  Status DC


Alteplase, Recombinant (Cathflo For Central Catheter Clearance) 1 mg 1X  ONCE 

INT CAT  Last administered on 3/31/20at 20:03;  Start 3/31/20 at 19:30;  Stop 

3/31/20 at 19:46;  Status DC


Alteplase, Recombinant (Cathflo For Central Catheter Clearance) 1 mg 1X  ONCE 

INT CAT  Last administered on 3/31/20at 22:05;  Start 3/31/20 at 22:00;  Stop 

3/31/20 at 22:01;  Status DC


Sodium Chloride 90 meq/Potassium Chloride 15 meq/ Potassium Phosphate 18 mmol/ 

Magnesium Sulfate 8 meq/Calcium Gluconate 15 meq/ Multivitamins 10 ml/Chromium/ 

Copper/Manganese/ Seleni/Zn 0.5 ml/ Insulin Human Regular 20 unit/ Total 

Parenteral Nutrition/Amino Acids/Dextrose/ Fat Emulsion Intravenous 1,400 ml @  

58.333 mls/ hr TPN  CONT IV  Last administered on 4/1/20at 21:30;  Start 4/1/20 

at 22:00;  Stop 4/2/20 at 21:59;  Status DC


Dexmedetomidine HCl 400 mcg/ Sodium Chloride 100 ml @ 0 mls/hr CONT  PRN IV 

ANXIETY / AGITATION Last administered on 5/30/20at 12:57;  Start 4/2/20 at 

08:15;  Stop 5/30/20 at 18:31;  Status DC


Sodium Chloride 500 ml @  500 mls/hr 1X PRN  PRN IV ELEVATED BP, SEE COMMENTS;  

Start 4/2/20 at 08:15


Atropine Sulfate (ATROPINE 0.5mg SYRINGE) 0.5 mg PRN Q5MIN  PRN IV SEE COMMENTS;

 Start 4/2/20 at 08:15


Furosemide (Lasix) 20 mg 1X  ONCE IVP  Last administered on 4/2/20at 08:19;  

Start 4/2/20 at 08:15;  Stop 4/2/20 at 08:16;  Status DC


Lidocaine HCl (Buffered Lidocaine 1%) 3 ml STK-MED ONCE .ROUTE ;  Start 4/2/20 

at 08:39;  Stop 4/2/20 at 08:39;  Status DC


Lidocaine HCl (Buffered Lidocaine 1%) 6 ml 1X  ONCE INJ  Last administered on 

4/2/20at 09:05;  Start 4/2/20 at 09:00;  Stop 4/2/20 at 09:06;  Status DC


Sodium Chloride 90 meq/Potassium Chloride 15 meq/ Potassium Phosphate 18 mmol/ 

Magnesium Sulfate 8 meq/Calcium Gluconate 15 meq/ Multivitamins 10 ml/Chromium/ 

Copper/Manganese/ Seleni/Zn 0.5 ml/ Insulin Human Regular 20 unit/ Total 

Parenteral Nutrition/Amino Acids/Dextrose/ Fat Emulsion Intravenous 1,400 ml @  

58.333 mls/ hr TPN  CONT IV  Last administered on 4/2/20at 22:45;  Start 4/2/20 

at 22:00;  Stop 4/3/20 at 21:59;  Status DC


Sodium Chloride 1,000 ml @  1,000 mls/hr Q1H PRN IV hypotension;  Start 4/3/20 

at 07:30;  Stop 4/3/20 at 13:29;  Status DC


Albumin Human 200 ml @  200 mls/hr 1X PRN  PRN IV Hypotension Last administered 

on 4/3/20at 09:36;  Start 4/3/20 at 07:30;  Stop 4/3/20 at 13:29;  Status DC


Sodium Chloride (Normal Saline Flush) 10 ml 1X PRN  PRN IV AP catheter pack;  

Start 4/3/20 at 07:30;  Stop 4/3/20 at 21:29;  Status DC


Sodium Chloride (Normal Saline Flush) 10 ml 1X PRN  PRN IV  catheter pack;  

Start 4/3/20 at 07:30;  Stop 4/4/20 at 07:29;  Status DC


Sodium Chloride 1,000 ml @  400 mls/hr Q2H30M PRN IV PATENCY;  Start 4/3/20 at 

07:30;  Stop 4/3/20 at 19:29;  Status DC


Info (PHARMACY MONITORING -- do not chart) 1 each PRN DAILY  PRN MC SEE 

COMMENTS;  Start 4/3/20 at 07:30;  Stop 4/3/20 at 13:02;  Status DC


Info (PHARMACY MONITORING -- do not chart) 1 each PRN DAILY  PRN MC SEE 

COMMENTS;  Start 4/3/20 at 07:30;  Stop 4/5/20 at 12:45;  Status DC


Sodium Chloride 90 meq/Potassium Chloride 15 meq/ Potassium Phosphate 10 mmol/ 

Magnesium Sulfate 8 meq/Calcium Gluconate 15 meq/ Multivitamins 10 ml/Chromium/ 

Copper/Manganese/ Seleni/Zn 0.5 ml/ Insulin Human Regular 25 unit/ Total 

Parenteral Nutrition/Amino Acids/Dextrose/ Fat Emulsion Intravenous 1,400 ml @  

58.333 mls/ hr TPN  CONT IV  Last administered on 4/3/20at 22:19;  Start 4/3/20 

at 22:00;  Stop 4/4/20 at 21:59;  Status DC


Heparin Sodium (Porcine) (Heparin Sodium) 5,000 unit Q12HR SQ  Last administered

on 4/26/20at 08:59;  Start 4/3/20 at 21:00;  Stop 4/26/20 at 10:05;  Status DC


Ondansetron HCl (Zofran) 4 mg PRN Q6HRS  PRN IV NAUSEA/VOMITING;  Start 4/6/20 

at 07:00;  Stop 4/7/20 at 06:59;  Status DC


Fentanyl Citrate (Fentanyl 2ml Vial) 25 mcg PRN Q5MIN  PRN IV MILD PAIN 1-3;  

Start 4/6/20 at 07:00;  Stop 4/7/20 at 06:59;  Status DC


Fentanyl Citrate (Fentanyl 2ml Vial) 50 mcg PRN Q5MIN  PRN IV MODERATE TO SEVERE

PAIN;  Start 4/6/20 at 07:00;  Stop 4/7/20 at 06:59;  Status DC


Ringer's Solution 1,000 ml @  30 mls/hr Q24H IV ;  Start 4/6/20 at 07:00;  Stop 

4/6/20 at 18:59;  Status DC


Lidocaine HCl (Xylocaine-Mpf 1% 2ml Vial) 2 ml PRN 1X  PRN ID PRIOR TO IV START;

 Start 4/6/20 at 07:00;  Stop 4/7/20 at 06:59;  Status DC


Prochlorperazine Edisylate (Compazine) 5 mg PACU PRN  PRN IV NAUSEA, MRX1;  

Start 4/6/20 at 07:00;  Stop 4/7/20 at 06:59;  Status DC


Sodium Chloride 1,000 ml @  1,000 mls/hr Q1H PRN IV hypotension;  Start 4/4/20 

at 09:10;  Stop 4/4/20 at 15:09;  Status DC


Albumin Human 200 ml @  200 mls/hr 1X PRN  PRN IV Hypotension Last administered 

on 4/4/20at 10:10;  Start 4/4/20 at 09:15;  Stop 4/4/20 at 15:14;  Status DC


Sodium Chloride 1,000 ml @  400 mls/hr Q2H30M PRN IV PATENCY;  Start 4/4/20 at 

09:10;  Stop 4/4/20 at 21:09;  Status DC


Info (PHARMACY MONITORING -- do not chart) 1 each PRN DAILY  PRN MC SEE 

COMMENTS;  Start 4/4/20 at 09:15;  Stop 4/5/20 at 12:45;  Status DC


Info (PHARMACY MONITORING -- do not chart) 1 each PRN DAILY  PRN MC SEE 

COMMENTS;  Start 4/4/20 at 09:15;  Stop 4/5/20 at 12:45;  Status DC


Sodium Chloride 90 meq/Potassium Chloride 15 meq/ Potassium Phosphate 10 mmol/ 

Magnesium Sulfate 8 meq/Calcium Gluconate 15 meq/ Multivitamins 10 ml/Chromium/ 

Copper/Manganese/ Seleni/Zn 0.5 ml/ Insulin Human Regular 25 unit/ Total 

Parenteral Nutrition/Amino Acids/Dextrose/ Fat Emulsion Intravenous 1,400 ml @  

58.333 mls/ hr TPN  CONT IV  Last administered on 4/4/20at 22:10;  Start 4/4/20 

at 22:00;  Stop 4/5/20 at 21:59;  Status DC


Magnesium Sulfate 50 ml @ 25 mls/hr PRN DAILY  PRN IV for Mag < 1.7 on am labs 

Last administered on 6/18/20at 10:57;  Start 4/5/20 at 09:15


Sodium Chloride 90 meq/Potassium Chloride 15 meq/ Potassium Phosphate 10 mmol/ 

Magnesium Sulfate 8 meq/Calcium Gluconate 15 meq/ Multivitamins 10 ml/Chromium/ 

Copper/Manganese/ Seleni/Zn 0.5 ml/ Insulin Human Regular 25 unit/ Total 

Parenteral Nutrition/Amino Acids/Dextrose/ Fat Emulsion Intravenous 1,400 ml @  

58.333 mls/ hr TPN  CONT IV  Last administered on 4/5/20at 21:20;  Start 4/5/20 

at 22:00;  Stop 4/6/20 at 21:59;  Status DC


Sodium Chloride 1,000 ml @  1,000 mls/hr Q1H PRN IV hypotension;  Start 4/5/20 

at 12:23;  Stop 4/5/20 at 18:22;  Status DC


Albumin Human 200 ml @  200 mls/hr 1X  ONCE IV  Last administered on 4/5/20at 

13:34;  Start 4/5/20 at 12:30;  Stop 4/5/20 at 13:29;  Status DC


Diphenhydramine HCl (Benadryl) 25 mg 1X PRN  PRN IV ITCHING;  Start 4/5/20 at 

12:30;  Stop 4/6/20 at 12:29;  Status DC


Diphenhydramine HCl (Benadryl) 25 mg 1X PRN  PRN IV ITCHING;  Start 4/5/20 at 

12:30;  Stop 4/6/20 at 12:29;  Status DC


Info (PHARMACY MONITORING -- do not chart) 1 each PRN DAILY  PRN MC SEE MIKEY

TS;  Start 4/5/20 at 12:30;  Status Cancel


Bupivacaine HCl/ Epinephrine Bitart (Sensorcain-Epi 0.5%-1:[id number] Mpf) 30 ml 

STK-MED ONCE .ROUTE  Last administered on 4/6/20at 11:44;  Start 4/6/20 at 

11:00;  Stop 4/6/20 at 11:01;  Status DC


Cellulose (Surgicel Fibrillar 1x2) 1 each STK-MED ONCE .ROUTE ;  Start 4/6/20 at

11:00;  Stop 4/6/20 at 11:01;  Status DC


Sodium Chloride 90 meq/Potassium Chloride 15 meq/ Potassium Phosphate 10 mmol/ 

Magnesium Sulfate 12 meq/Calcium Gluconate 15 meq/ Multivitamins 10 ml/Chromium/

Copper/Manganese/ Seleni/Zn 0.5 ml/ Insulin Human Regular 25 unit/ Total 

Parenteral Nutrition/Amino Acids/Dextrose/ Fat Emulsion Intravenous 1,400 ml @  

58.333 mls/ hr TPN  CONT IV  Last administered on 4/6/20at 22:24;  Start 4/6/20 

at 22:00;  Stop 4/7/20 at 21:59;  Status DC


Propofol 20 ml @ As Directed STK-MED ONCE IV ;  Start 4/6/20 at 11:07;  Stop 

4/6/20 at 11:07;  Status DC


Cellulose (Surgicel Hemostat 4x8) 1 each STK-MED ONCE .ROUTE  Last administered 

on 4/6/20at 11:44;  Start 4/6/20 at 11:55;  Stop 4/6/20 at 11:56;  Status DC


Sevoflurane (Ultane) 60 ml STK-MED ONCE IH ;  Start 4/6/20 at 12:46;  Stop 

4/6/20 at 12:46;  Status DC


Sodium Chloride 1,000 ml @  1,000 mls/hr Q1H PRN IV hypotension;  Start 4/6/20 

at 13:51;  Stop 4/6/20 at 19:50;  Status DC


Albumin Human 200 ml @  200 mls/hr 1X PRN  PRN IV Hypotension Last administered 

on 4/6/20at 14:51;  Start 4/6/20 at 14:00;  Stop 4/6/20 at 19:59;  Status DC


Diphenhydramine HCl (Benadryl) 25 mg 1X PRN  PRN IV ITCHING;  Start 4/6/20 at 

14:00;  Stop 4/7/20 at 13:59;  Status DC


Diphenhydramine HCl (Benadryl) 25 mg 1X PRN  PRN IV ITCHING;  Start 4/6/20 at 

14:00;  Stop 4/7/20 at 13:59;  Status DC


Sodium Chloride 1,000 ml @  400 mls/hr Q2H30M PRN IV PATENCY;  Start 4/6/20 at 

13:51;  Stop 4/7/20 at 01:50;  Status DC


Info (PHARMACY MONITORING -- do not chart) 1 each PRN DAILY  PRN MC SEE 

COMMENTS;  Start 4/6/20 at 14:00;  Stop 4/9/20 at 08:16;  Status DC


Heparin Sodium (Porcine) (Hep Lock Adult) 500 unit STK-MED ONCE IVP ;  Start 

4/7/20 at 09:29;  Stop 4/7/20 at 09:30;  Status DC


Sodium Chloride 1,000 ml @  1,000 mls/hr Q1H PRN IV hypotension;  Start 4/7/20 

at 10:43;  Stop 4/7/20 at 16:42;  Status DC


Sodium Chloride 1,000 ml @  400 mls/hr Q2H30M PRN IV PATENCY;  Start 4/7/20 at 

10:43;  Stop 4/7/20 at 22:42;  Status DC


Info (PHARMACY MONITORING -- do not chart) 1 each PRN DAILY  PRN MC SEE 

COMMENTS;  Start 4/7/20 at 10:45;  Status UNV


Info (PHARMACY MONITORING -- do not chart) 1 each PRN DAILY  PRN MC SEE 

COMMENTS;  Start 4/7/20 at 10:45;  Status UNV


Sodium Chloride 90 meq/Potassium Chloride 15 meq/ Magnesium Sulfate 12 

meq/Calcium Gluconate 15 meq/ Multivitamins 10 ml/Chromium/ Copper/Manganese/ 

Seleni/Zn 0.5 ml/ Insulin Human Regular 25 unit/ Total Parenteral 

Nutrition/Amino Acids/Dextrose/ Fat Emulsion Intravenous 1,400 ml @  58.333 mls/

hr TPN  CONT IV  Last administered on 4/7/20at 22:13;  Start 4/7/20 at 22:00;  

Stop 4/8/20 at 21:59;  Status DC


Sodium Chloride 1,000 ml @  1,000 mls/hr Q1H PRN IV hypotension;  Start 4/8/20 

at 07:50;  Stop 4/8/20 at 13:49;  Status DC


Albumin Human 200 ml @  200 mls/hr 1X  ONCE IV ;  Start 4/8/20 at 08:00;  Stop 

4/8/20 at 08:53;  Status DC


Diphenhydramine HCl (Benadryl) 25 mg 1X PRN  PRN IV ITCHING;  Start 4/8/20 at 

08:00;  Stop 4/9/20 at 07:59;  Status DC


Diphenhydramine HCl (Benadryl) 25 mg 1X PRN  PRN IV ITCHING;  Start 4/8/20 at 

08:00;  Stop 4/9/20 at 07:59;  Status DC


Info (PHARMACY MONITORING -- do not chart) 1 each PRN DAILY  PRN MC SEE 

COMMENTS;  Start 4/8/20 at 08:00;  Stop 4/9/20 at 08:16;  Status DC


Albumin Human 50 ml @ 50 mls/hr 1X  ONCE IV ;  Start 4/8/20 at 08:53;  Stop 

4/8/20 at 08:56;  Status DC


Albumin Human 200 ml @  50 mls/hr PRN 1X  PRN IV HYPOTENSION Last administered 

on 4/14/20at 11:54;  Start 4/8/20 at 09:00;  Stop 5/21/20 at 11:14;  Status DC


Meropenem 500 mg/ Sodium Chloride 50 ml @  100 mls/hr Q12H IV  Last administered

on 4/28/20at 10:45;  Start 4/8/20 at 10:00;  Stop 4/28/20 at 12:37;  Status DC


Sodium Chloride 90 meq/Magnesium Sulfate 12 meq/ Calcium Gluconate 15 meq/ 

Multivitamins 10 ml/Chromium/ Copper/Manganese/ Seleni/Zn 0.5 ml/ Insulin Human 

Regular 25 unit/ Total Parenteral Nutrition/Amino Acids/Dextrose/ Fat Emulsion 

Intravenous 1,400 ml @  58.333 mls/ hr TPN  CONT IV  Last administered on 

4/8/20at 21:41;  Start 4/8/20 at 22:00;  Stop 4/9/20 at 21:59;  Status DC


Sodium Chloride 1,000 ml @  1,000 mls/hr Q1H PRN IV hypotension;  Start 4/9/20 

at 07:58;  Stop 4/9/20 at 13:57;  Status DC


Albumin Human 200 ml @  200 mls/hr 1X PRN  PRN IV Hypotension Last administered 

on 4/9/20at 09:30;  Start 4/9/20 at 08:00;  Stop 4/9/20 at 13:59;  Status DC


Sodium Chloride 1,000 ml @  400 mls/hr Q2H30M PRN IV PATENCY;  Start 4/9/20 at 

07:58;  Stop 4/9/20 at 19:57;  Status DC


Info (PHARMACY MONITORING -- do not chart) 1 each PRN DAILY  PRN MC SEE 

COMMENTS;  Start 4/9/20 at 08:00;  Status Cancel


Info (PHARMACY MONITORING -- do not chart) 1 each PRN DAILY  PRN MC SEE 

COMMENTS;  Start 4/9/20 at 08:15;  Status UNV


Sodium Chloride 90 meq/Potassium Phosphate 5 mmol/ Magnesium Sulfate 12 

meq/Calcium Gluconate 15 meq/ Multivitamins 10 ml/Chromium/ Copper/Manganese/ 

Seleni/Zn 0.5 ml/ Insulin Human Regular 30 unit/ Total Parenteral 

Nutrition/Amino Acids/Dextrose/ Fat Emulsion Intravenous 1,400 ml @  58.333 mls/

hr TPN  CONT IV  Last administered on 4/9/20at 22:08;  Start 4/9/20 at 22:00;  

Stop 4/10/20 at 21:59;  Status DC


Linezolid/Dextrose 300 ml @  300 mls/hr Q12HR IV  Last administered on 4/20/20at

20:40;  Start 4/10/20 at 11:00;  Stop 4/21/20 at 08:10;  Status DC


Sodium Chloride 90 meq/Potassium Phosphate 15 mmol/ Magnesium Sulfate 12 

meq/Calcium Gluconate 15 meq/ Multivitamins 10 ml/Chromium/ Copper/Manganese/ 

Seleni/Zn 0.5 ml/ Insulin Human Regular 30 unit/ Total Parenteral 

Nutrition/Amino Acids/Dextrose/ Fat Emulsion Intravenous 1,400 ml @  58.333 mls/

hr TPN  CONT IV  Last administered on 4/10/20at 21:49;  Start 4/10/20 at 22:00; 

Stop 4/11/20 at 21:59;  Status DC


Sodium Chloride 90 meq/Potassium Phosphate 15 mmol/ Magnesium Sulfate 12 

meq/Calcium Gluconate 15 meq/ Multivitamins 10 ml/Chromium/ Copper/Manganese/ 

Seleni/Zn 0.5 ml/ Insulin Human Regular 40 unit/ Total Parenteral 

Nutrition/Amino Acids/Dextrose/ Fat Emulsion Intravenous 1,400 ml @  58.333 mls/

hr TPN  CONT IV  Last administered on 4/11/20at 21:21;  Start 4/11/20 at 22:00; 

Stop 4/12/20 at 21:59;  Status DC


Sodium Chloride 1,000 ml @  1,000 mls/hr Q1H PRN IV hypotension;  Start 4/11/20 

at 13:26;  Stop 4/11/20 at 19:25;  Status DC


Albumin Human 200 ml @  200 mls/hr 1X PRN  PRN IV Hypotension Last administered 

on 4/11/20at 15:00;  Start 4/11/20 at 13:30;  Stop 4/11/20 at 19:29;  Status DC


Sodium Chloride (Normal Saline Flush) 10 ml 1X PRN  PRN IV AP catheter pack;  

Start 4/11/20 at 13:30;  Stop 4/12/20 at 13:29;  Status DC


Sodium Chloride (Normal Saline Flush) 10 ml 1X PRN  PRN IV  catheter pack;  

Start 4/11/20 at 13:30;  Stop 4/12/20 at 13:29;  Status DC


Sodium Chloride 1,000 ml @  400 mls/hr Q2H30M PRN IV PATENCY;  Start 4/11/20 at 

13:26;  Stop 4/12/20 at 01:25;  Status DC


Info (PHARMACY MONITORING -- do not chart) 1 each PRN DAILY  PRN MC SEE 

COMMENTS;  Start 4/11/20 at 13:30;  Stop 4/11/20 at 13:33;  Status DC


Info (PHARMACY MONITORING -- do not chart) 1 each PRN DAILY  PRN MC SEE 

COMMENTS;  Start 4/11/20 at 13:30;  Stop 4/11/20 at 13:34;  Status DC


Sodium Chloride 90 meq/Potassium Phosphate 19 mmol/ Magnesium Sulfate 12 

meq/Calcium Gluconate 15 meq/ Multivitamins 10 ml/Chromium/ Copper/Manganese/ 

Seleni/Zn 0.5 ml/ Insulin Human Regular 40 unit/ Total Parenteral 

Nutrition/Amino Acids/Dextrose/ Fat Emulsion Intravenous 1,400 ml @  58.333 mls/

hr TPN  CONT IV  Last administered on 4/12/20at 21:54;  Start 4/12/20 at 22:00; 

Stop 4/13/20 at 21:59;  Status DC


Sodium Chloride 1,000 ml @  1,000 mls/hr Q1H PRN IV hypotension;  Start 4/13/20 

at 09:35;  Stop 4/13/20 at 15:34;  Status DC


Albumin Human 200 ml @  200 mls/hr 1X PRN  PRN IV Hypotension;  Start 4/13/20 at

09:45;  Stop 4/13/20 at 15:44;  Status DC


Diphenhydramine HCl (Benadryl) 25 mg 1X PRN  PRN IV ITCHING;  Start 4/13/20 at 

09:45;  Stop 4/14/20 at 09:44;  Status DC


Diphenhydramine HCl (Benadryl) 25 mg 1X PRN  PRN IV ITCHING;  Start 4/13/20 at 

09:45;  Stop 4/14/20 at 09:44;  Status DC


Sodium Chloride 1,000 ml @  400 mls/hr Q2H30M PRN IV PATENCY;  Start 4/13/20 at 

09:35;  Stop 4/13/20 at 21:34;  Status DC


Info (PHARMACY MONITORING -- do not chart) 1 each PRN DAILY  PRN MC SEE 

COMMENTS;  Start 4/13/20 at 09:45;  Status Cancel


Sodium Chloride 100 meq/Potassium Phosphate 19 mmol/ Magnesium Sulfate 12 me

q/Calcium Gluconate 15 meq/ Multivitamins 10 ml/Chromium/ Copper/Manganese/ 

Seleni/Zn 0.5 ml/ Insulin Human Regular 40 unit/ Potassium Chloride 20 meq/ 

Total Parenteral Nutrition/Amino Acids/Dextrose/ Fat Emulsion Intravenous 1,400 

ml @  58.333 mls/ hr TPN  CONT IV  Last administered on 4/13/20at 22:02;  Start 

4/13/20 at 22:00;  Stop 4/14/20 at 21:59;  Status DC


Furosemide (Lasix) 40 mg 1X  ONCE IVP  Last administered on 4/13/20at 14:39;  

Start 4/13/20 at 14:30;  Stop 4/13/20 at 14:31;  Status DC


Metronidazole 100 ml @  100 mls/hr Q8HRS IV  Last administered on 4/21/20at 

06:04;  Start 4/14/20 at 10:00;  Stop 4/21/20 at 08:10;  Status DC


Sodium Chloride 1,000 ml @  1,000 mls/hr Q1H PRN IV hypotension;  Start 4/14/20 

at 08:00;  Stop 4/14/20 at 13:59;  Status DC


Albumin Human 200 ml @  200 mls/hr 1X PRN  PRN IV Hypotension;  Start 4/14/20 at

08:00;  Stop 4/14/20 at 13:59;  Status DC


Sodium Chloride 1,000 ml @  400 mls/hr Q2H30M PRN IV PATENCY;  Start 4/14/20 at 

08:00;  Stop 4/14/20 at 19:59;  Status DC


Info (PHARMACY MONITORING -- do not chart) 1 each PRN DAILY  PRN MC SEE 

COMMENTS;  Start 4/14/20 at 11:30;  Status UNV


Info (PHARMACY MONITORING -- do not chart) 1 each PRN DAILY  PRN MC SEE 

COMMENTS;  Start 4/14/20 at 11:30;  Stop 4/16/20 at 12:13;  Status DC


Sodium Chloride 100 meq/Potassium Phosphate 19 mmol/ Magnesium Sulfate 12 

meq/Calcium Gluconate 15 meq/ Multivitamins 10 ml/Chromium/ Copper/Manganese/ 

Seleni/Zn 0.5 ml/ Insulin Human Regular 40 unit/ Potassium Chloride 20 meq/ 

Total Parenteral Nutrition/Amino Acids/Dextrose/ Fat Emulsion Intravenous 1,400 

ml @  58.333 mls/ hr TPN  CONT IV  Last administered on 4/14/20at 21:52;  Start 

4/14/20 at 22:00;  Stop 4/15/20 at 21:59;  Status DC


Sodium Chloride (Normal Saline Flush) 10 ml QSHIFT  PRN IV AFTER MEDS AND BLOOD 

DRAWS;  Start 4/14/20 at 15:00;  Stop 5/12/20 at 11:27;  Status DC


Sodium Chloride (Normal Saline Flush) 10 ml PRN Q5MIN  PRN IV AFTER MEDS AND 

BLOOD DRAWS;  Start 4/14/20 at 15:00


Sodium Chloride (Normal Saline Flush) 20 ml PRN Q5MIN  PRN IV AFTER MEDS AND 

BLOOD DRAWS;  Start 4/14/20 at 15:00


Sodium Chloride 100 meq/Potassium Phosphate 19 mmol/ Magnesium Sulfate 12 

meq/Calcium Gluconate 15 meq/ Multivitamins 10 ml/Chromium/ Copper/Manganese/ 

Seleni/Zn 0.5 ml/ Insulin Human Regular 40 unit/ Potassium Chloride 20 meq/ 

Total Parenteral Nutrition/Amino Acids/Dextrose/ Fat Emulsion Intravenous 1,400 

ml @  58.333 mls/ hr TPN  CONT IV  Last administered on 4/15/20at 21:20;  Start 

4/15/20 at 22:00;  Stop 4/16/20 at 21:59;  Status DC


Lidocaine HCl (Buffered Lidocaine 1%) 3 ml STK-MED ONCE .ROUTE ;  Start 4/15/20 

at 13:16;  Stop 4/15/20 at 13:16;  Status DC


Lidocaine HCl (Buffered Lidocaine 1%) 6 ml 1X  ONCE INJ  Last administered on 

4/15/20at 13:45;  Start 4/15/20 at 13:30;  Stop 4/15/20 at 13:31;  Status DC


Albumin Human 100 ml @  100 mls/hr 1X  ONCE IV  Last administered on 4/15/20at 

15:41;  Start 4/15/20 at 15:00;  Stop 4/15/20 at 15:59;  Status DC


Albumin Human 50 ml @ 50 mls/hr 1X  ONCE IV  Last administered on 4/15/20at 

15:00;  Start 4/15/20 at 15:00;  Stop 4/15/20 at 15:59;  Status DC


Info (PHARMACY MONITORING -- do not chart) 1 each PRN DAILY  PRN MC SEE 

COMMENTS;  Start 4/16/20 at 11:30;  Status Cancel


Info (PHARMACY MONITORING -- do not chart) 1 each PRN DAILY  PRN MC SEE 

COMMENTS;  Start 4/16/20 at 11:30;  Status UNV


Sodium Chloride 100 meq/Potassium Phosphate 10 mmol/ Magnesium Sulfate 12 

meq/Calcium Gluconate 15 meq/ Multivitamins 10 ml/Chromium/ Copper/Manganese/ 

Seleni/Zn 0.5 ml/ Insulin Human Regular 35 unit/ Potassium Chloride 20 meq/ 

Total Parenteral Nutrition/Amino Acids/Dextrose/ Fat Emulsion Intravenous 1,400 

ml @  58.333 mls/ hr TPN  CONT IV  Last administered on 4/16/20at 22:10;  Start 

4/16/20 at 22:00;  Stop 4/17/20 at 21:59;  Status DC


Sodium Chloride 100 meq/Potassium Phosphate 5 mmol/ Magnesium Sulfate 12 

meq/Calcium Gluconate 15 meq/ Multivitamins 10 ml/Chromium/ Copper/Manganese/ 

Seleni/Zn 0.5 ml/ Insulin Human Regular 35 unit/ Potassium Chloride 20 meq/ 

Total Parenteral Nutrition/Amino Acids/Dextrose/ Fat Emulsion Intravenous 1,400 

ml @  58.333 mls/ hr TPN  CONT IV  Last administered on 4/17/20at 22:59;  Start 

4/17/20 at 22:00;  Stop 4/18/20 at 21:59;  Status DC


Sodium Chloride 1,000 ml @  1,000 mls/hr Q1H PRN IV hypotension;  Start 4/18/20 

at 08:27;  Stop 4/18/20 at 14:26;  Status DC


Albumin Human 200 ml @  200 mls/hr 1X PRN  PRN IV Hypotension Last administered 

on 4/18/20at 09:18;  Start 4/18/20 at 08:30;  Stop 4/18/20 at 14:29;  Status DC


Sodium Chloride 1,000 ml @  400 mls/hr Q2H30M PRN IV PATENCY;  Start 4/18/20 at 

08:27;  Stop 4/18/20 at 20:26;  Status DC


Info (PHARMACY MONITORING -- do not chart) 1 each PRN DAILY  PRN MC SEE 

COMMENTS;  Start 4/18/20 at 08:30;  Status Cancel


Info (PHARMACY MONITORING -- do not chart) 1 each PRN DAILY  PRN MC SEE 

COMMENTS;  Start 4/18/20 at 08:30;  Stop 4/26/20 at 13:10;  Status DC


Sodium Chloride 100 meq/Potassium Chloride 40 meq/ Magnesium Sulfate 15 

meq/Calcium Gluconate 15 meq/ Multivitamins 10 ml/Chromium/ Copper/Manganese/ 

Seleni/Zn 0.5 ml/ Insulin Human Regular 35 unit/ Total Parenteral Nutrition

/Amino Acids/Dextrose/ Fat Emulsion Intravenous 1,400 ml @  58.333 mls/ hr TPN  

CONT IV  Last administered on 4/18/20at 22:00;  Start 4/18/20 at 22:00;  Stop 

4/19/20 at 21:59;  Status DC


Potassium Chloride/Water 100 ml @  100 mls/hr 1X  ONCE IV  Last administered on 

4/18/20at 17:28;  Start 4/18/20 at 14:45;  Stop 4/18/20 at 15:44;  Status DC


Sodium Chloride 100 meq/Potassium Chloride 40 meq/ Magnesium Sulfate 15 

meq/Calcium Gluconate 15 meq/ Multivitamins 10 ml/Chromium/ Copper/Manganese/ 

Seleni/Zn 0.5 ml/ Insulin Human Regular 35 unit/ Total Parenteral 

Nutrition/Amino Acids/Dextrose/ Fat Emulsion Intravenous 1,400 ml @  58.333 mls/

hr TPN  CONT IV  Last administered on 4/19/20at 22:46;  Start 4/19/20 at 22:00; 

Stop 4/20/20 at 21:59;  Status DC


Sodium Chloride 100 meq/Potassium Chloride 40 meq/ Magnesium Sulfate 20 

meq/Calcium Gluconate 15 meq/ Multivitamins 10 ml/Chromium/ Copper/Manganese/ 

Seleni/Zn 0.5 ml/ Insulin Human Regular 35 unit/ Total Parenteral 

Nutrition/Amino Acids/Dextrose/ Fat Emulsion Intravenous 1,400 ml @  58.333 mls/

hr TPN  CONT IV  Last administered on 4/20/20at 22:31;  Start 4/20/20 at 22:00; 

Stop 4/21/20 at 21:59;  Status DC


Fentanyl Citrate (Fentanyl 2ml Vial) 50 mcg PRN Q2HR  PRN IVP PAIN Last 

administered on 4/27/20at 13:32;  Start 4/20/20 at 21:00;  Stop 4/28/20 at 

12:53;  Status DC


Fentanyl Citrate (Fentanyl 2ml Vial) 25 mcg PRN Q2HR  PRN IVP PAIN;  Start 

4/20/20 at 21:00;  Stop 4/28/20 at 12:54;  Status DC


Enoxaparin Sodium (Lovenox 100mg Syringe) 100 mg Q12HR SQ ;  Start 4/21/20 at 

21:00;  Status UNV


Amino Acids/ Glycerin/ Electrolytes 1,000 ml @  75 mls/hr [id number] IV ;  Start 

4/20/20 at 21:15;  Status UNV


Sodium Chloride 1,000 ml @  1,000 mls/hr Q1H PRN IV hypotension;  Start 4/21/20 

at 07:56;  Stop 4/21/20 at 13:55;  Status DC


Albumin Human 200 ml @  200 mls/hr 1X PRN  PRN IV Hypotension Last administered 

on 4/21/20at 08:40;  Start 4/21/20 at 08:00;  Stop 4/21/20 at 13:59;  Status DC


Sodium Chloride 1,000 ml @  400 mls/hr Q2H30M PRN IV PATENCY;  Start 4/21/20 at 

07:56;  Stop 4/21/20 at 19:55;  Status DC


Info (PHARMACY MONITORING -- do not chart) 1 each PRN DAILY  PRN MC SEE 

COMMENTS;  Start 4/21/20 at 08:00;  Status UNV


Info (PHARMACY MONITORING -- do not chart) 1 each PRN DAILY  PRN MC SEE 

COMMENTS;  Start 4/21/20 at 08:00;  Status UNV


Daptomycin 430 mg/ Sodium Chloride 50 ml @  100 mls/hr Q24H IV  Last 

administered on 4/21/20at 12:35;  Start 4/21/20 at 09:00;  Stop 4/21/20 at 

12:49;  Status DC


Sodium Chloride 100 meq/Potassium Chloride 40 meq/ Magnesium Sulfate 20 

meq/Calcium Gluconate 15 meq/ Multivitamins 10 ml/Chromium/ Copper/Manganese/ 

Seleni/Zn 0.5 ml/ Insulin Human Regular 35 unit/ Total Parenteral 

Nutrition/Amino Acids/Dextrose/ Fat Emulsion Intravenous 1,400 ml @  58.333 mls/

hr TPN  CONT IV  Last administered on 4/21/20at 21:26;  Start 4/21/20 at 22:00; 

Stop 4/22/20 at 21:59;  Status DC


Daptomycin 430 mg/ Sodium Chloride 50 ml @  100 mls/hr Q48H IV ;  Start 4/23/20 

at 09:00;  Stop 4/22/20 at 11:55;  Status DC


Sodium Chloride 100 meq/Potassium Chloride 40 meq/ Magnesium Sulfate 20 

meq/Calcium Gluconate 15 meq/ Multivitamins 10 ml/Chromium/ Copper/Manganese/ 

Seleni/Zn 0.5 ml/ Insulin Human Regular 35 unit/ Total Parenteral 

Nutrition/Amino Acids/Dextrose/ Fat Emulsion Intravenous 1,400 ml @  58.333 mls/

hr TPN  CONT IV  Last administered on 4/22/20at 22:27;  Start 4/22/20 at 22:00; 

Stop 4/23/20 at 21:59;  Status DC


Daptomycin 430 mg/ Sodium Chloride 50 ml @  100 mls/hr Q24H IV  Last 

administered on 4/24/20at 15:07;  Start 4/22/20 at 13:00;  Stop 4/25/20 at 

13:15;  Status DC


Sodium Chloride 100 meq/Potassium Chloride 40 meq/ Magnesium Sulfate 20 

meq/Calcium Gluconate 10 meq/ Multivitamins 10 ml/Chromium/ Copper/Manganese/ 

Seleni/Zn 0.5 ml/ Insulin Human Regular 35 unit/ Total Parenteral 

Nutrition/Amino Acids/Dextrose/ Fat Emulsion Intravenous 1,400 ml @  58.333 mls/

hr TPN  CONT IV  Last administered on 4/24/20at 00:06;  Start 4/23/20 at 22:00; 

Stop 4/24/20 at 21:59;  Status DC


Alteplase, Recombinant (Cathflo For Central Catheter Clearance) 1 mg 1X  ONCE 

INT CAT  Last administered on 4/24/20at 11:44;  Start 4/24/20 at 10:45;  Stop 

4/24/20 at 10:46;  Status DC


Ondansetron HCl (Zofran) 4 mg PRN Q6HRS  PRN IV NAUSEA/VOMITING;  Start 4/27/20 

at 07:00;  Stop 4/28/20 at 06:59;  Status DC


Fentanyl Citrate (Fentanyl 2ml Vial) 25 mcg PRN Q5MIN  PRN IV MILD PAIN 1-3;  

Start 4/27/20 at 07:00;  Stop 4/28/20 at 06:59;  Status DC


Fentanyl Citrate (Fentanyl 2ml Vial) 50 mcg PRN Q5MIN  PRN IV MODERATE TO SEVERE

PAIN Last administered on 4/27/20at 10:17;  Start 4/27/20 at 07:00;  Stop 

4/28/20 at 06:59;  Status DC


Ringer's Solution 1,000 ml @  30 mls/hr Q24H IV ;  Start 4/27/20 at 07:00;  Stop

4/27/20 at 18:59;  Status DC


Lidocaine HCl (Xylocaine-Mpf 1% 2ml Vial) 2 ml PRN 1X  PRN ID PRIOR TO IV START;

 Start 4/27/20 at 07:00;  Stop 4/28/20 at 06:59;  Status DC


Prochlorperazine Edisylate (Compazine) 5 mg PACU PRN  PRN IV NAUSEA, MRX1;  

Start 4/27/20 at 07:00;  Stop 4/28/20 at 06:59;  Status DC


Sodium Acetate 50 meq/Potassium Acetate 55 meq/ Magnesium Sulfate 20 meq/Calcium

Gluconate 10 meq/ Multivitamins 10 ml/Chromium/ Copper/Manganese/ Seleni/Zn 0.5 

ml/ Insulin Human Regular 35 unit/ Total Parenteral Nutrition/Amino Acids/

Dextrose/ Fat Emulsion Intravenous 1,400 ml @  58.333 mls/ hr TPN  CONT IV ;  

Start 4/24/20 at 22:00;  Stop 4/24/20 at 14:15;  Status DC


Sodium Acetate 50 meq/Potassium Acetate 55 meq/ Magnesium Sulfate 20 meq/Calcium

Gluconate 10 meq/ Multivitamins 10 ml/Chromium/ Copper/Manganese/ Seleni/Zn 0.5 

ml/ Insulin Human Regular 35 unit/ Total Parenteral Nutrition/Amino 

Acids/Dextrose/ Fat Emulsion Intravenous 1,800 ml @  75 mls/hr TPN  CONT IV  

Last administered on 4/24/20at 22:38;  Start 4/24/20 at 22:00;  Stop 4/25/20 at 

21:59;  Status DC


Sodium Chloride 1,000 ml @  1,000 mls/hr Q1H PRN IV hypotension;  Start 4/24/20 

at 15:31;  Stop 4/24/20 at 21:30;  Status DC


Diphenhydramine HCl (Benadryl) 25 mg 1X PRN  PRN IV ITCHING;  Start 4/24/20 at 

15:45;  Stop 4/25/20 at 15:44;  Status DC


Diphenhydramine HCl (Benadryl) 25 mg 1X PRN  PRN IV ITCHING;  Start 4/24/20 at 

15:45;  Stop 4/25/20 at 15:44;  Status DC


Sodium Chloride 1,000 ml @  400 mls/hr Q2H30M PRN IV PATENCY;  Start 4/24/20 at 

15:31;  Stop 4/25/20 at 03:30;  Status DC


Info (PHARMACY MONITORING -- do not chart) 1 each PRN DAILY  PRN MC SEE 

COMMENTS;  Start 4/24/20 at 15:45;  Stop 5/26/20 at 14:14;  Status DC


Sodium Acetate 50 meq/Potassium Acetate 55 meq/ Magnesium Sulfate 20 meq/Calcium

Gluconate 10 meq/ Multivitamins 10 ml/Chromium/ Copper/Manganese/ Seleni/Zn 0.5 

ml/ Insulin Human Regular 35 unit/ Total Parenteral Nutrition/Amino Acids/D

extrose/ Fat Emulsion Intravenous 1,800 ml @  75 mls/hr TPN  CONT IV  Last 

administered on 4/25/20at 22:03;  Start 4/25/20 at 22:00;  Stop 4/26/20 at 

21:59;  Status DC


Daptomycin 430 mg/ Sodium Chloride 50 ml @  100 mls/hr Q24H IV  Last 

administered on 4/30/20at 13:00;  Start 4/25/20 at 13:00;  Stop 4/30/20 at 

20:58;  Status DC


Heparin Sodium (Porcine) 1000 unit/Sodium Chloride 1,001 ml @  1,001 mls/hr 1X  

ONCE IRR ;  Start 4/27/20 at 06:00;  Stop 4/27/20 at 06:59;  Status DC


Potassium Acetate 55 meq/Magnesium Sulfate 20 meq/ Calcium Gluconate 10 meq/ 

Multivitamins 10 ml/Chromium/ Copper/Manganese/ Seleni/Zn 0.5 ml/ Insulin Human 

Regular 35 unit/ Total Parenteral Nutrition/Amino Acids/Dextrose/ Fat Emulsion 

Intravenous 1,920 ml @  80 mls/hr TPN  CONT IV  Last administered on 4/26/20at 

22:10;  Start 4/26/20 at 22:00;  Stop 4/27/20 at 21:59;  Status DC


Dexamethasone Sodium Phosphate (Decadron) 4 mg STK-MED ONCE .ROUTE ;  Start 

4/27/20 at 10:56;  Stop 4/27/20 at 10:57;  Status DC


Ondansetron HCl (Zofran) 4 mg STK-MED ONCE .ROUTE ;  Start 4/27/20 at 10:56;  

Stop 4/27/20 at 10:57;  Status DC


Rocuronium Bromide (Zemuron) 50 mg STK-MED ONCE .ROUTE ;  Start 4/27/20 at 

10:56;  Stop 4/27/20 at 10:57;  Status DC


Fentanyl Citrate (Fentanyl 2ml Vial) 100 mcg STK-MED ONCE .ROUTE ;  Start 

4/27/20 at 10:56;  Stop 4/27/20 at 10:57;  Status DC


Bupivacaine HCl/ Epinephrine Bitart (Sensorcain-Epi 0.5%-1:[id number] Mpf) 30 ml 

STK-MED ONCE .ROUTE  Last administered on 4/27/20at 12:01;  Start 4/27/20 at 

10:58;  Stop 4/27/20 at 10:58;  Status DC


Cellulose (Surgicel Hemostat 2x14) 1 each STK-MED ONCE .ROUTE ;  Start 4/27/20 

at 10:58;  Stop 4/27/20 at 10:59;  Status DC


Iohexol (Omnipaque 300 Mg/ml) 50 ml STK-MED ONCE .ROUTE ;  Start 4/27/20 at 

10:58;  Stop 4/27/20 at 10:59;  Status DC


Cellulose (Surgicel Hemostat 4x8) 1 each STK-MED ONCE .ROUTE ;  Start 4/27/20 at

10:58;  Stop 4/27/20 at 10:59;  Status DC


Bisacodyl (Dulcolax Supp) 10 mg STK-MED ONCE .ROUTE ;  Start 4/27/20 at 10:59;  

Stop 4/27/20 at 10:59;  Status DC


Heparin Sodium (Porcine) 1000 unit/Sodium Chloride 1,001 ml @  1,001 mls/hr 1X  

ONCE IRR ;  Start 4/27/20 at 12:00;  Stop 4/27/20 at 12:59;  Status DC


Propofol 20 ml @ As Directed STK-MED ONCE IV ;  Start 4/27/20 at 11:05;  Stop 

4/27/20 at 11:05;  Status DC


Sevoflurane (Ultane) 90 ml STK-MED ONCE IH ;  Start 4/27/20 at 11:05;  Stop 

4/27/20 at 11:05;  Status DC


Sevoflurane (Ultane) 60 ml STK-MED ONCE IH ;  Start 4/27/20 at 12:26;  Stop 

4/27/20 at 12:27;  Status DC


Propofol 20 ml @ As Directed STK-MED ONCE IV ;  Start 4/27/20 at 12:26;  Stop 

4/27/20 at 12:27;  Status DC


Phenylephrine HCl (PHENYLEPHRINE in 0.9% NACL PF) 1 mg STK-MED ONCE IV ;  Start 

4/27/20 at 12:34;  Stop 4/27/20 at 12:34;  Status DC


Heparin Sodium (Porcine) (Heparin Sodium) 5,000 unit Q12HR SQ  Last administered

on 5/6/20at 20:57;  Start 4/27/20 at 21:00;  Stop 5/7/20 at 09:59;  Status DC


Sodium Chloride (Normal Saline Flush) 3 ml QSHIFT  PRN IV AFTER MEDS AND BLOOD 

DRAWS;  Start 4/27/20 at 13:45;  Status Cancel


Naloxone HCl (Narcan) 0.4 mg PRN Q2MIN  PRN IV SEE INSTRUCTIONS Last 

administered on 6/6/20at 15:15;  Start 4/27/20 at 13:45;  Stop 7/1/20 at 16:00; 

Status DC


Sodium Chloride 1,000 ml @  25 mls/hr Q24H IV  Last administered on 5/26/20at 

13:37;  Start 4/27/20 at 13:37;  Stop 5/29/20 at 13:09;  Status DC


Naloxone HCl (Narcan) 0.4 mg PRN Q2MIN  PRN IV SEE INSTRUCTIONS;  Start 4/27/20 

at 14:30;  Status UNV


Sodium Chloride 1,000 ml @  25 mls/hr Q24H IV ;  Start 4/27/20 at 14:30;  Status

UNV


Hydromorphone HCl 30 ml @ 0 mls/hr CONT PRN  PRN IV PER PROTOCOL Last admini

stered on 5/2/20at 16:08;  Start 4/27/20 at 14:30;  Stop 5/4/20 at 08:55;  

Status DC


Potassium Acetate 55 meq/Magnesium Sulfate 20 meq/ Calcium Gluconate 10 meq/ 

Multivitamins 10 ml/Chromium/ Copper/Manganese/ Seleni/Zn 0.5 ml/ Insulin Human 

Regular 35 unit/ Total Parenteral Nutrition/Amino Acids/Dextrose/ Fat Emulsion 

Intravenous 1,920 ml @  80 mls/hr TPN  CONT IV  Last administered on 4/27/20at 

22:01;  Start 4/27/20 at 22:00;  Stop 4/28/20 at 21:59;  Status DC


Bumetanide (Bumex) 2 mg [id number] IV  Last administered on 5/1/20at 13:50;  Start 

4/28/20 at 14:00;  Stop 5/2/20 at 14:10;  Status DC


Meropenem 1 gm/ Sodium Chloride 100 ml @  200 mls/hr Q8HRS IV  Last administered

on 5/22/20at 05:53;  Start 4/28/20 at 14:00;  Stop 5/22/20 at 09:31;  Status DC


Potassium Acetate 55 meq/Magnesium Sulfate 20 meq/ Calcium Gluconate 10 meq/ 

Multivitamins 10 ml/Chromium/ Copper/Manganese/ Seleni/Zn 0.5 ml/ Insulin Human 

Regular 35 unit/ Total Parenteral Nutrition/Amino Acids/Dextrose/ Fat Emulsion 

Intravenous 1,920 ml @  80 mls/hr TPN  CONT IV  Last administered on 4/28/20at 

22:02;  Start 4/28/20 at 22:00;  Stop 4/29/20 at 21:59;  Status DC


Hydromorphone HCl (Dilaudid Standard PCA) 12 mg STK-MED ONCE IV ;  Start 4/27/20

at 14:35;  Stop 4/28/20 at 13:53;  Status DC


Artificial Tears (Artificial Tears) 1 drop PRN Q15MIN  PRN OU DRY EYE Last 

administered on 6/23/20at 21:17;  Start 4/29/20 at 05:30


Hydromorphone HCl (Dilaudid Standard PCA) 12 mg STK-MED ONCE IV ;  Start 4/28/20

at 12:05;  Stop 4/29/20 at 09:15;  Status DC


Potassium Acetate 65 meq/Magnesium Sulfate 20 meq/ Calcium Gluconate 10 meq/ 

Multivitamins 10 ml/Chromium/ Copper/Manganese/ Seleni/Zn 0.5 ml/ Insulin Human 

Regular 30 unit/ Total Parenteral Nutrition/Amino Acids/Dextrose/ Fat Emulsion 

Intravenous 1,920 ml @  80 mls/hr TPN  CONT IV  Last administered on 4/29/20at 

22:22;  Start 4/29/20 at 22:00;  Stop 4/30/20 at 21:59;  Status DC


Cyclobenzaprine HCl (Flexeril) 10 mg PRN Q6HRS  PRN PO MUSCLE SPASMS Last 

administered on 7/10/20at 19:12;  Start 4/30/20 at 10:45


Potassium Acetate 55 meq/Magnesium Sulfate 20 meq/ Calcium Gluconate 10 meq/ 

Multivitamins 10 ml/Chromium/ Copper/Manganese/ Seleni/Zn 0.5 ml/ Insulin Human 

Regular 30 unit/ Total Parenteral Nutrition/Amino Acids/Dextrose/ Fat Emulsion 

Intravenous 1,920 ml @  80 mls/hr TPN  CONT IV  Last administered on 5/1/20at 

01:00;  Start 4/30/20 at 22:00;  Stop 5/1/20 at 21:59;  Status DC


Magnesium Sulfate 50 ml @ 25 mls/hr 1X  ONCE IV  Last administered on 4/30/20at 

17:18;  Start 4/30/20 at 12:45;  Stop 4/30/20 at 14:44;  Status DC


Potassium Chloride/Water 100 ml @  100 mls/hr 1X  ONCE IV  Last administered on 

5/1/20at 11:27;  Start 5/1/20 at 12:00;  Stop 5/1/20 at 12:59;  Status DC


Hydromorphone HCl (Dilaudid Standard PCA) 12 mg STK-MED ONCE IV ;  Start 4/29/20

at 10:50;  Stop 5/1/20 at 11:02;  Status DC


Hydromorphone HCl (Dilaudid Standard PCA) 12 mg STK-MED ONCE IV ;  Start 4/30/20

at 13:47;  Stop 5/1/20 at 11:03;  Status DC


Potassium Acetate 30 meq/Magnesium Sulfate 20 meq/ Calcium Gluconate 10 meq/ 

Multivitamins 10 ml/Chromium/ Copper/Manganese/ Seleni/Zn 0.5 ml/ Insulin Human 

Regular 30 unit/ Potassium Chloride 30 meq/ Total Parenteral Nutrition/Amino 

Acids/Dextrose/ Fat Emulsion Intravenous 1,920 ml @  80 mls/hr TPN  CONT IV  

Last administered on 5/1/20at 22:34;  Start 5/1/20 at 22:00;  Stop 5/2/20 at 

21:59;  Status DC


Potassium Chloride/Water 100 ml @  100 mls/hr Q1H IV  Last administered on 

5/2/20at 13:05;  Start 5/2/20 at 07:00;  Stop 5/2/20 at 10:59;  Status DC


Magnesium Sulfate 50 ml @ 25 mls/hr 1X  ONCE IV  Last administered on 5/2/20at 

10:34;  Start 5/2/20 at 10:30;  Stop 5/2/20 at 12:29;  Status DC


Potassium Chloride 75 meq/ Magnesium Sulfate 20 meq/Calcium Gluconate 10 meq/ 

Multivitamins 10 ml/Chromium/ Copper/Manganese/ Seleni/Zn 0.5 ml/ Insulin Human 

Regular 30 unit/ Total Parenteral Nutrition/Amino Acids/Dextrose/ Fat Emulsion 

Intravenous 1,920 ml @  80 mls/hr TPN  CONT IV  Last administered on 5/2/20at 

21:51;  Start 5/2/20 at 22:00;  Stop 5/3/20 at 22:00;  Status DC


Potassium Chloride 75 meq/ Magnesium Sulfate 20 meq/Calcium Gluconate 10 meq/ 

Multivitamins 10 ml/Chromium/ Copper/Manganese/ Seleni/Zn 0.5 ml/ Insulin Human 

Regular 25 unit/ Total Parenteral Nutrition/Amino Acids/Dextrose/ Fat Emulsion 

Intravenous 1,920 ml @  80 mls/hr TPN  CONT IV  Last administered on 5/3/20at 

22:04;  Start 5/3/20 at 22:00;  Stop 5/4/20 at 21:59;  Status DC


Hydromorphone HCl (Dilaudid) 0.4 mg PRN Q4HRS  PRN IVP PAIN Last administered on

5/4/20at 10:57;  Start 5/4/20 at 09:00;  Stop 5/4/20 at 18:59;  Status DC


Micafungin Sodium 100 mg/Dextrose 100 ml @  100 mls/hr Q24H IV  Last 

administered on 5/26/20at 12:17;  Start 5/4/20 at 11:00;  Stop 5/27/20 at 09:59;

 Status DC


Daptomycin 485 mg/ Sodium Chloride 50 ml @  100 mls/hr Q24H IV  Last 

administered on 5/11/20at 13:10;  Start 5/4/20 at 11:00;  Stop 5/12/20 at 07:44;

 Status DC


Potassium Chloride 75 meq/ Magnesium Sulfate 15 meq/Calcium Gluconate 8 meq/ 

Multivitamins 10 ml/Chromium/ Copper/Manganese/ Seleni/Zn 0.5 ml/ Insulin Human 

Regular 25 unit/ Total Parenteral Nutrition/Amino Acids/Dextrose/ Fat Emulsion 

Intravenous 1,920 ml @  80 mls/hr TPN  CONT IV  Last administered on 5/4/20at 

23:08;  Start 5/4/20 at 22:00;  Stop 5/5/20 at 21:59;  Status DC


Haloperidol Lactate (Haldol Inj) 3 mg 1X  ONCE IVP  Last administered on 

5/4/20at 14:37;  Start 5/4/20 at 14:30;  Stop 5/4/20 at 14:31;  Status DC


Hydromorphone HCl (Dilaudid) 1 mg PRN Q4HRS  PRN IVP PAIN Last administered on 

5/18/20at 06:25;  Start 5/4/20 at 19:00;  Stop 5/18/20 at 17:10;  Status DC


Potassium Chloride 75 meq/ Magnesium Sulfate 15 meq/Calcium Gluconate 8 meq/ 

Multivitamins 10 ml/Chromium/ Copper/Manganese/ Seleni/Zn 0.5 ml/ Insulin Human 

Regular 20 unit/ Total Parenteral Nutrition/Amino Acids/Dextrose/ Fat Emulsion 

Intravenous 1,920 ml @  80 mls/hr TPN  CONT IV  Last administered on 5/5/20at 

22:10;  Start 5/5/20 at 22:00;  Stop 5/6/20 at 21:59;  Status DC


Lidocaine HCl (Buffered Lidocaine 1%) 3 ml STK-MED ONCE .ROUTE ;  Start 5/6/20 

at 11:31;  Stop 5/6/20 at 11:31;  Status DC


Lidocaine HCl (Buffered Lidocaine 1%) 3 ml STK-MED ONCE .ROUTE ;  Start 5/6/20 

at 12:28;  Stop 5/6/20 at 12:29;  Status DC


Lidocaine HCl (Buffered Lidocaine 1%) 6 ml 1X  ONCE INJ  Last administered on 

5/6/20at 12:53;  Start 5/6/20 at 12:45;  Stop 5/6/20 at 12:46;  Status DC


Potassium Chloride 75 meq/ Magnesium Sulfate 15 meq/Calcium Gluconate 8 meq/ 

Multivitamins 10 ml/Chromium/ Copper/Manganese/ Seleni/Zn 0.5 ml/ Insulin Human 

Regular 20 unit/ Total Parenteral Nutrition/Amino Acids/Dextrose/ Fat Emulsion 

Intravenous 1,920 ml @  80 mls/hr TPN  CONT IV  Last administered on 5/6/20at 

22:00;  Start 5/6/20 at 22:00;  Stop 5/7/20 at 21:59;  Status DC


Potassium Chloride 75 meq/ Magnesium Sulfate 15 meq/Calcium Gluconate 8 meq/ 

Multivitamins 10 ml/Chromium/ Copper/Manganese/ Seleni/Zn 0.5 ml/ Insulin Human 

Regular 15 unit/ Total Parenteral Nutrition/Amino Acids/Dextrose/ Fat Emulsion 

Intravenous 1,920 ml @  80 mls/hr TPN  CONT IV  Last administered on 5/7/20at 

22:28;  Start 5/7/20 at 22:00;  Stop 5/8/20 at 21:59;  Status DC


Vecuronium Bromide (Norcuron Bolus) 6 mg PRN Q6HRS  PRN IV VENT ASYNCHRONY;  

Start 5/7/20 at 19:15;  Stop 5/7/20 at 19:35;  Status DC


Bumetanide (Bumex) 2 mg 1X  ONCE IV  Last administered on 5/7/20at 22:09;  Start

5/7/20 at 19:45;  Stop 5/7/20 at 19:46;  Status DC


Lidocaine HCl (Buffered Lidocaine 1%) 3 ml STK-MED ONCE .ROUTE ;  Start 5/8/20 

at 07:59;  Stop 5/8/20 at 07:59;  Status DC


Midazolam HCl (Versed) 5 mg STK-MED ONCE .ROUTE ;  Start 5/8/20 at 08:36;  Stop 

5/8/20 at 08:36;  Status DC


Fentanyl Citrate (Fentanyl 5ml Vial) 250 mcg STK-MED ONCE .ROUTE ;  Start 5/8/20

at 08:36;  Stop 5/8/20 at 08:37;  Status DC


Lidocaine HCl (Buffered Lidocaine 1%) 3 ml 1X  ONCE IJ  Last administered on 

5/8/20at 09:30;  Start 5/8/20 at 09:15;  Stop 5/8/20 at 09:16;  Status DC


Midazolam HCl (Versed) 5 mg 1X  ONCE IV  Last administered on 5/8/20at 09:30;  

Start 5/8/20 at 09:15;  Stop 5/8/20 at 09:16;  Status DC


Fentanyl Citrate (Fentanyl 5ml Vial) 250 mcg 1X  ONCE IV  Last administered on 

5/8/20at 09:30;  Start 5/8/20 at 09:15;  Stop 5/8/20 at 09:16;  Status DC


Bumetanide (Bumex) 2 mg DAILY IV  Last administered on 5/18/20at 08:07;  Start 

5/8/20 at 10:00;  Stop 5/18/20 at 17:15;  Status DC


Potassium Chloride 75 meq/ Magnesium Sulfate 15 meq/ Multivitamins 10 

ml/Chromium/ Copper/Manganese/ Seleni/Zn 0.5 ml/ Insulin Human Regular 15 unit/ 

Total Parenteral Nutrition/Amino Acids/Dextrose/ Fat Emulsion Intravenous 1,920 

ml @  80 mls/hr TPN  CONT IV  Last administered on 5/8/20at 21:59;  Start 5/8/20

at 22:00;  Stop 5/9/20 at 21:59;  Status DC


Metoclopramide HCl (Reglan Vial) 10 mg PRN Q3HRS  PRN IVP NAUSEA/VOMITING-3rd 

choice Last administered on 5/14/20at 04:25;  Start 5/9/20 at 16:45


Potassium Chloride 75 meq/ Magnesium Sulfate 15 meq/ Multivitamins 10 

ml/Chromium/ Copper/Manganese/ Seleni/Zn 0.5 ml/ Insulin Human Regular 15 unit/ 

Total Parenteral Nutrition/Amino Acids/Dextrose/ Fat Emulsion Intravenous 1,920 

ml @  80 mls/hr TPN  CONT IV  Last administered on 5/9/20at 22:41;  Start 5/9/20

at 22:00;  Stop 5/10/20 at 21:59;  Status DC


Magnesium Sulfate 50 ml @ 25 mls/hr 1X  ONCE IV  Last administered on 5/10/20at 

10:44;  Start 5/10/20 at 09:00;  Stop 5/10/20 at 10:59;  Status DC


Potassium Chloride/Water 100 ml @  100 mls/hr 1X  ONCE IV  Last administered on 

5/10/20at 09:37;  Start 5/10/20 at 09:00;  Stop 5/10/20 at 09:59;  Status DC


Duloxetine HCl (Cymbalta) 30 mg DAILY PO  Last administered on 5/11/20at 09:48; 

Start 5/10/20 at 14:00;  Stop 5/13/20 at 10:25;  Status DC


Potassium Chloride 80 meq/ Magnesium Sulfate 20 meq/ Multivitamins 10 

ml/Chromium/ Copper/Manganese/ Seleni/Zn 0.5 ml/ Insulin Human Regular 15 unit/ 

Total Parenteral Nutrition/Amino Acids/Dextrose/ Fat Emulsion Intravenous 1,920 

ml @  80 mls/hr TPN  CONT IV  Last administered on 5/10/20at 21:42;  Start 

5/10/20 at 22:00;  Stop 5/11/20 at 21:59;  Status DC


Potassium Chloride 80 meq/ Magnesium Sulfate 20 meq/ Multivitamins 10 

ml/Chromium/ Copper/Manganese/ Seleni/Zn 0.5 ml/ Insulin Human Regular 15 unit/ 

Total Parenteral Nutrition/Amino Acids/Dextrose/ Fat Emulsion Intravenous 1,920 

ml @  80 mls/hr TPN  CONT IV  Last administered on 5/11/20at 22:20;  Start 

5/11/20 at 22:00;  Stop 5/12/20 at 21:59;  Status DC


Lidocaine HCl (Buffered Lidocaine 1%) 3 ml STK-MED ONCE .ROUTE ;  Start 5/12/20 

at 09:54;  Stop 5/12/20 at 09:55;  Status DC


Hydromorphone HCl (Dilaudid Standard PCA) 12 mg STK-MED ONCE IV ;  Start 5/1/20 

at 15:50;  Stop 5/12/20 at 11:24;  Status DC


Potassium Chloride 80 meq/ Magnesium Sulfate 20 meq/ Multivitamins 10 

ml/Chromium/ Copper/Manganese/ Seleni/Zn 0.5 ml/ Insulin Human Regular 15 unit/ 

Total Parenteral Nutrition/Amino Acids/Dextrose/ Fat Emulsion Intravenous 1,920 

ml @  80 mls/hr TPN  CONT IV  Last administered on 5/12/20at 21:40;  Start 

5/12/20 at 22:00;  Stop 5/13/20 at 21:59;  Status DC


Lidocaine HCl (Buffered Lidocaine 1%) 6 ml 1X  ONCE INJ  Last administered on 

5/12/20at 14:15;  Start 5/12/20 at 14:15;  Stop 5/12/20 at 14:16;  Status DC


Potassium Chloride 80 meq/ Magnesium Sulfate 20 meq/ Multivitamins 10 

ml/Chromium/ Copper/Manganese/ Seleni/Zn 1 ml/ Insulin Human Regular 15 unit/ 

Total Parenteral Nutrition/Amino Acids/Dextrose/ Fat Emulsion Intravenous 1,920 

ml @  80 mls/hr TPN  CONT IV  Last administered on 5/13/20at 22:04;  Start 

5/13/20 at 22:00;  Stop 5/14/20 at 21:59;  Status DC


Potassium Chloride/Water 100 ml @  100 mls/hr 1X  ONCE IV  Last administered on 

5/14/20at 11:34;  Start 5/14/20 at 11:00;  Stop 5/14/20 at 11:59;  Status DC


Potassium Chloride 90 meq/ Magnesium Sulfate 20 meq/ Multivitamins 10 

ml/Chromium/ Copper/Manganese/ Seleni/Zn 1 ml/ Insulin Human Regular 15 unit/ 

Total Parenteral Nutrition/Amino Acids/Dextrose/ Fat Emulsion Intravenous 1,920 

ml @  80 mls/hr TPN  CONT IV  Last administered on 5/14/20at 22:57;  Start 

5/14/20 at 22:00;  Stop 5/15/20 at 21:59;  Status DC


Potassium Chloride 90 meq/ Magnesium Sulfate 20 meq/ Multivitamins 10 

ml/Chromium/ Copper/Manganese/ Seleni/Zn 1 ml/ Insulin Human Regular 15 unit/ 

Total Parenteral Nutrition/Amino Acids/Dextrose/ Fat Emulsion Intravenous 1,920 

ml @  80 mls/hr TPN  CONT IV  Last administered on 5/15/20at 22:48;  Start 

5/15/20 at 22:00;  Stop 5/16/20 at 21:59;  Status DC


Potassium Chloride 90 meq/ Magnesium Sulfate 20 meq/ Multivitamins 10 

ml/Chromium/ Copper/Manganese/ Seleni/Zn 1 ml/ Insulin Human Regular 15 unit/ 

Total Parenteral Nutrition/Amino Acids/Dextrose/ Fat Emulsion Intravenous 1,890 

ml @  78.75 mls/ hr TPN  CONT IV  Last administered on 5/16/20at 22:15;  Start 

5/16/20 at 22:00;  Stop 5/17/20 at 21:59;  Status DC


Linezolid/Dextrose 300 ml @  300 mls/hr Q12HR IV  Last administered on 5/19/20at

21:08;  Start 5/17/20 at 09:00;  Stop 5/20/20 at 08:11;  Status DC


Daptomycin 450 mg/ Sodium Chloride 50 ml @  100 mls/hr Q24H IV  Last 

administered on 5/20/20at 09:25;  Start 5/17/20 at 09:00;  Stop 5/21/20 at 

08:30;  Status DC


Potassium Chloride 90 meq/ Magnesium Sulfate 20 meq/ Multivitamins 10 

ml/Chromium/ Copper/Manganese/ Seleni/Zn 1 ml/ Insulin Human Regular 15 unit/ 

Total Parenteral Nutrition/Amino Acids/Dextrose/ Fat Emulsion Intravenous 1,890 

ml @  78.75 mls/ hr TPN  CONT IV  Last administered on 5/17/20at 21:34;  Start 

5/17/20 at 22:00;  Stop 5/18/20 at 21:59;  Status DC


Lorazepam (Ativan Inj) 2 mg STK-MED ONCE .ROUTE ;  Start 5/17/20 at 14:58;  Stop

5/17/20 at 14:58;  Status DC


Metoprolol Tartrate (Lopressor Vial) 5 mg 1X  ONCE IVP  Last administered on 

5/17/20at 15:31;  Start 5/17/20 at 15:15;  Stop 5/17/20 at 15:16;  Status DC


Lorazepam (Ativan Inj) 2 mg 1X  ONCE IVP  Last administered on 5/17/20at 15:30; 

Start 5/17/20 at 15:15;  Stop 5/17/20 at 15:16;  Status DC


Enoxaparin Sodium (Lovenox 40mg Syringe) 40 mg Q24H SQ  Last administered on 

6/5/20at 17:44;  Start 5/17/20 at 17:00;  Stop 6/7/20 at 06:50;  Status DC


Lorazepam (Ativan Inj) 1 mg PRN Q4HRS  PRN IVP ANXIETY / AGITATION MILD-MOD Last

administered on 5/31/20at 15:55;  Start 5/17/20 at 19:15;  Stop 6/2/20 at 11:45;

 Status DC


Lorazepam (Ativan Inj) 2 mg PRN Q4HRS  PRN IVP ANXIETY / AGITATION SEVERE Last 

administered on 6/1/20at 07:55;  Start 5/17/20 at 19:15;  Stop 6/2/20 at 11:45; 

Status DC


Fentanyl Citrate (Fentanyl 2ml Vial) 50 mcg PRN Q4HRS  PRN IVP SEVERE PAIN Last 

administered on 6/13/20at 05:15;  Start 5/18/20 at 13:15;  Stop 6/14/20 at 

09:29;  Status DC


Fentanyl Citrate (Fentanyl 2ml Vial) 25 mcg PRN Q4HRS  PRN IVP MODERATE PAIN 

Last administered on 6/13/20at 00:27;  Start 5/18/20 at 13:15;  Stop 6/14/20 at 

09:30;  Status DC


Potassium Chloride 90 meq/ Magnesium Sulfate 20 meq/ Multivitamins 10 

ml/Chromium/ Copper/Manganese/ Seleni/Zn 1 ml/ Insulin Human Regular 15 unit/ 

Total Parenteral Nutrition/Amino Acids/Dextrose/ Fat Emulsion Intravenous 1,890 

ml @  78.75 mls/ hr TPN  CONT IV  Last administered on 5/18/20at 22:18;  Start 

5/18/20 at 22:00;  Stop 5/19/20 at 21:59;  Status DC


Furosemide (Lasix) 40 mg 1X  ONCE IVP  Last administered on 5/18/20at 21:51;  

Start 5/18/20 at 21:45;  Stop 5/18/20 at 21:48;  Status DC


Albumin Human 100 ml @  100 mls/hr 1X PRN  PRN IV SEE COMMENTS;  Start 5/19/20 

at 01:30


Furosemide (Lasix) 40 mg [id number] IVP  Last administered on 6/3/20at 08:04;  Start 

5/19/20 at 14:00;  Stop 6/3/20 at 13:07;  Status DC


Potassium Chloride 90 meq/ Magnesium Sulfate 20 meq/ Multivitamins 10 

ml/Chromium/ Copper/Manganese/ Seleni/Zn 1 ml/ Insulin Human Regular 15 unit/ 

Total Parenteral Nutrition/Amino Acids/Dextrose/ Fat Emulsion Intravenous 1,800 

ml @  75 mls/hr TPN  CONT IV  Last administered on 5/19/20at 22:31;  Start 5 /19/20 at 22:00;  Stop 5/20/20 at 21:59;  Status DC


Potassium Chloride 90 meq/ Magnesium Sulfate 20 meq/ Multivitamins 10 ml/Chr

omium/ Copper/Manganese/ Seleni/Zn 1 ml/ Insulin Human Regular 15 unit/ Total 

Parenteral Nutrition/Amino Acids/Dextrose/ Fat Emulsion Intravenous 1,800 ml @  

75 mls/hr TPN  CONT IV  Last administered on 5/20/20at 22:28;  Start 5/20/20 at 

22:00;  Stop 5/21/20 at 21:59;  Status DC


Potassium Chloride 110 meq/ Magnesium Sulfate 20 meq/ Multivitamins 10 

ml/Chromium/ Copper/Manganese/ Seleni/Zn 1 ml/ Insulin Human Regular 15 unit/ 

Total Parenteral Nutrition/Amino Acids/Dextrose/ Fat Emulsion Intravenous 1,800 

ml @  75 mls/hr TPN  CONT IV  Last administered on 5/21/20at 22:01;  Start 

5/21/20 at 22:00;  Stop 5/22/20 at 21:59;  Status DC


Saliva Substitute (Biotene Moisturizing Mouth) 2 spray PRN Q15MIN  PRN PO DRY 

MOUTH;  Start 5/21/20 at 11:00


Potassium Chloride 110 meq/ Magnesium Sulfate 20 meq/ Multivitamins 10 

ml/Chromium/ Copper/Manganese/ Seleni/Zn 1 ml/ Insulin Human Regular 15 unit/ T

otal Parenteral Nutrition/Amino Acids/Dextrose/ Fat Emulsion Intravenous 1,800 

ml @  75 mls/hr TPN  CONT IV  Last administered on 5/22/20at 22:21;  Start 

5/22/20 at 22:00;  Stop 5/23/20 at 21:59;  Status DC


Potassium Chloride 110 meq/ Magnesium Sulfate 20 meq/ Multivitamins 10 

ml/Chromium/ Copper/Manganese/ Seleni/Zn 1 ml/ Insulin Human Regular 15 unit/ 

Total Parenteral Nutrition/Amino Acids/Dextrose/ Fat Emulsion Intravenous 1,800 

ml @  75 mls/hr TPN  CONT IV  Last administered on 5/23/20at 22:04;  Start 5 /23/20 at 22:00;  Stop 5/24/20 at 21:59;  Status DC


Potassium Chloride 110 meq/ Magnesium Sulfate 20 meq/ Multivitamins 10 ml/Ch

romium/ Copper/Manganese/ Seleni/Zn 1 ml/ Insulin Human Regular 15 unit/ Total 

Parenteral Nutrition/Amino Acids/Dextrose/ Fat Emulsion Intravenous 1,800 ml @  

75 mls/hr TPN  CONT IV  Last administered on 5/24/20at 22:48;  Start 5/24/20 at 

22:00;  Stop 5/25/20 at 21:59;  Status DC


Potassium Chloride 70 meq/ Magnesium Sulfate 20 meq/ Multivitamins 10 

ml/Chromium/ Copper/Manganese/ Seleni/Zn 1 ml/ Insulin Human Regular 15 unit/ 

Total Parenteral Nutrition/Amino Acids/Dextrose/ Fat Emulsion Intravenous 1,800 

ml @  75 mls/hr TPN  CONT IV  Last administered on 5/25/20at 21:39;  Start 

5/25/20 at 22:00;  Stop 5/26/20 at 21:59;  Status DC


Meropenem 500 mg/ Sodium Chloride 50 ml @  100 mls/hr Q6HRS IV  Last 

administered on 5/27/20at 06:02;  Start 5/25/20 at 18:00;  Stop 5/27/20 at 

09:59;  Status DC


Barium Sulfate (Varibar Thin Liquid Apple) 148 gm 1X  ONCE PO ;  Start 5/26/20 

at 11:45;  Stop 5/26/20 at 11:49;  Status DC


Potassium Chloride 70 meq/ Magnesium Sulfate 20 meq/ Multivitamins 10 

ml/Chromium/ Copper/Manganese/ Seleni/Zn 1 ml/ Insulin Human Regular 15 unit/ 

Total Parenteral Nutrition/Amino Acids/Dextrose/ Fat Emulsion Intravenous 1,800 

ml @  75 mls/hr TPN  CONT IV  Last administered on 5/26/20at 22:27;  Start 

5/26/20 at 22:00;  Stop 5/27/20 at 21:59;  Status DC


Piperacillin Sod/ Tazobactam Sod 3.375 gm/Sodium Chloride 50 ml @  100 mls/hr 

Q6HRS IV  Last administered on 6/4/20at 06:10;  Start 5/27/20 at 12:00;  Stop 

6/4/20 at 07:26;  Status DC


Potassium Chloride 70 meq/ Magnesium Sulfate 20 meq/ Multivitamins 10 ml/

Chromium/ Copper/Manganese/ Seleni/Zn 1 ml/ Insulin Human Regular 15 unit/ Total

Parenteral Nutrition/Amino Acids/Dextrose/ Fat Emulsion Intravenous 1,800 ml @  

75 mls/hr TPN  CONT IV  Last administered on 5/27/20at 22:03;  Start 5/27/20 at 

22:00;  Stop 5/28/20 at 21:59;  Status DC


Potassium Chloride 70 meq/ Magnesium Sulfate 20 meq/ Multivitamins 10 

ml/Chromium/ Copper/Manganese/ Seleni/Zn 1 ml/ Insulin Human Regular 15 unit/ 

Total Parenteral Nutrition/Amino Acids/Dextrose/ Fat Emulsion Intravenous 1,800 

ml @  75 mls/hr TPN  CONT IV  Last administered on 5/28/20at 22:33;  Start 

5/28/20 at 22:00;  Stop 5/29/20 at 21:59;  Status DC


Potassium Chloride 70 meq/ Magnesium Sulfate 20 meq/ Multivitamins 10 

ml/Chromium/ Copper/Manganese/ Seleni/Zn 1 ml/ Insulin Human Regular 15 unit/ 

Total Parenteral Nutrition/Amino Acids/Dextrose/ Fat Emulsion Intravenous 1,800 

ml @  75 mls/hr TPN  CONT IV  Last administered on 5/29/20at 23:13;  Start 

5/29/20 at 22:00;  Stop 5/30/20 at 21:59;  Status DC


Potassium Chloride 80 meq/ Magnesium Sulfate 20 meq/ Multivitamins 10 

ml/Chromium/ Copper/Manganese/ Seleni/Zn 1 ml/ Insulin Human Regular 15 unit/ 

Total Parenteral Nutrition/Amino Acids/Dextrose/ Fat Emulsion Intravenous 1,800 

ml @  75 mls/hr TPN  CONT IV  Last administered on 5/30/20at 22:30;  Start 

5/30/20 at 22:00;  Stop 5/31/20 at 21:59;  Status DC


Potassium Chloride 80 meq/ Magnesium Sulfate 20 meq/ Multivitamins 10 

ml/Chromium/ Copper/Manganese/ Seleni/Zn 1 ml/ Insulin Human Regular 15 unit/ 

Total Parenteral Nutrition/Amino Acids/Dextrose/ Fat Emulsion Intravenous 1,800 

ml @  75 mls/hr TPN  CONT IV  Last administered on 5/31/20at 21:54;  Start 

5/31/20 at 22:00;  Stop 6/1/20 at 21:59;  Status DC


Potassium Chloride/Water 100 ml @  100 mls/hr 1X  ONCE IV  Last administered on 

6/1/20at 10:15;  Start 6/1/20 at 10:00;  Stop 6/1/20 at 10:59;  Status DC


Potassium Chloride 90 meq/ Magnesium Sulfate 20 meq/ Multivitamins 10 

ml/Chromium/ Copper/Manganese/ Seleni/Zn 1 ml/ Insulin Human Regular 20 unit/ 

Total Parenteral Nutrition/Amino Acids/Dextrose/ Fat Emulsion Intravenous 1,800 

ml @  75 mls/hr TPN  CONT IV  Last administered on 6/1/20at 22:28;  Start 6/1/20

at 22:00;  Stop 6/2/20 at 21:59;  Status DC


Potassium Chloride 90 meq/ Magnesium Sulfate 20 meq/ Multivitamins 10 

ml/Chromium/ Copper/Manganese/ Seleni/Zn 1 ml/ Insulin Human Regular 20 unit/ 

Total Parenteral Nutrition/Amino Acids/Dextrose/ Fat Emulsion Intravenous 1,800 

ml @  75 mls/hr TPN  CONT IV  Last administered on 6/2/20at 22:08;  Start 6/2/20

at 22:00;  Stop 6/3/20 at 21:59;  Status DC


Lorazepam (Ativan Inj) 0.25 mg PRN Q4HRS  PRN IVP ANXIETY / AGITATION Last 

administered on 7/25/20at 13:06;  Start 6/3/20 at 07:30


Potassium Chloride 90 meq/ Magnesium Sulfate 20 meq/ Multivitamins 10 

ml/Chromium/ Copper/Manganese/ Seleni/Zn 1 ml/ Insulin Human Regular 20 unit/ 

Total Parenteral Nutrition/Amino Acids/Dextrose/ Fat Emulsion Intravenous 1,800 

ml @  75 mls/hr TPN  CONT IV  Last administered on 6/3/20at 23:13;  Start 6/3/20

at 22:00;  Stop 6/4/20 at 21:59;  Status DC


Furosemide (Lasix) 40 mg DAILY IVP  Last administered on 6/5/20at 11:14;  Start 

6/3/20 at 13:30;  Stop 6/7/20 at 09:12;  Status DC


Fluoxetine HCl (PROzac) 20 mg QHS PEG  Last administered on 7/27/20at 20:50;  

Start 6/4/20 at 21:00


Fentanyl (Duragesic 50mcg/ Hr Patch) 1 patch Q72H TD  Last administered on 

6/4/20at 21:22;  Start 6/4/20 at 21:00;  Stop 6/13/20 at 12:00;  Status DC


Potassium Chloride 40 meq/ Potassium Acetate 60 meq/Magnesium Sulfate 10 meq/ 

Multivitamins 10 ml/Chromium/ Copper/Manganese/ Seleni/Zn 1 ml/ Insulin Human 

Regular 20 unit/ Total Parenteral Nutrition/Amino Acids/Dextrose/ Fat Emulsion 

Intravenous 1,800 ml @  75 mls/hr TPN  CONT IV  Last administered on 6/5/20at 

00:03;  Start 6/4/20 at 22:00;  Stop 6/5/20 at 21:59;  Status DC


Potassium Acetate 80 meq/Magnesium Sulfate 5 meq/ Multivitamins 10 ml/Chromium/ 

Copper/Manganese/ Seleni/Zn 1 ml/ Insulin Human Regular 20 unit/ Total Parenter

al Nutrition/Amino Acids/Dextrose/ Fat Emulsion Intravenous 1,920 ml @  80 

mls/hr TPN  CONT IV  Last administered on 6/5/20at 21:59;  Start 6/5/20 at 

22:00;  Stop 6/6/20 at 21:59;  Status DC


Potassium Acetate 60 meq/Magnesium Sulfate 5 meq/ Multivitamins 10 ml/Chromium/ 

Copper/Manganese/ Seleni/Zn 1 ml/ Insulin Human Regular 30 unit/ Total 

Parenteral Nutrition/Amino Acids/Dextrose/ Fat Emulsion Intravenous 1,920 ml @  

80 mls/hr TPN  CONT IV  Last administered on 6/6/20at 21:54;  Start 6/6/20 at 

22:00;  Stop 6/7/20 at 21:59;  Status DC


Norepinephrine Bitartrate 8 mg/ Dextrose 258 ml @  13.332 mls/ hr CONT  PRN IV 

PER PROTOCOL Last administered on 7/2/20at 09:09;  Start 6/7/20 at 06:30


Albumin Human 500 ml @  125 mls/hr 1X  ONCE IV  Last administered on 6/7/20at 

08:10;  Start 6/7/20 at 08:15;  Stop 6/7/20 at 12:14;  Status DC


Potassium Acetate 40 meq/Magnesium Sulfate 5 meq/ Multivitamins 10 ml/Chromium/ 

Copper/Manganese/ Seleni/Zn 1 ml/ Insulin Human Regular 30 unit/ Total 

Parenteral Nutrition/Amino Acids/Dextrose/ Fat Emulsion Intravenous 1,920 ml @  

80 mls/hr TPN  CONT IV  Last administered on 6/7/20at 22:23;  Start 6/7/20 at 

22:00;  Stop 6/8/20 at 21:59;  Status DC


Meropenem 1 gm/ Sodium Chloride 100 ml @  200 mls/hr Q8HRS IV ;  Start 6/7/20 at

14:00;  Status Cancel


Meropenem 1 gm/ Sodium Chloride 100 ml @  200 mls/hr Q8HRS IV  Last administered

on 6/7/20at 11:04;  Start 6/7/20 at 10:00;  Stop 6/7/20 at 13:00;  Status DC


Meropenem 1 gm/ Sodium Chloride 100 ml @  200 mls/hr Q12HR IV  Last administered

on 6/25/20at 08:27;  Start 6/7/20 at 21:00;  Stop 6/25/20 at 08:56;  Status DC


Sodium Chloride 1,000 ml @  1,000 mls/hr 1X  ONCE IV  Last administered on 

6/7/20at 11:06;  Start 6/7/20 at 10:45;  Stop 6/7/20 at 11:44;  Status DC


Micafungin Sodium 100 mg/Dextrose 100 ml @  100 mls/hr Q24H IV  Last 

administered on 6/24/20at 12:34;  Start 6/7/20 at 11:00;  Stop 6/25/20 at 08:56;

 Status DC


Daptomycin 410 mg/ Sodium Chloride 50 ml @  100 mls/hr Q24H IV  Last 

administered on 6/9/20at 13:33;  Start 6/7/20 at 14:00;  Stop 6/10/20 at 08:30; 

Status DC


Midazolam HCl (Versed) 2 mg STK-MED ONCE .ROUTE ;  Start 6/7/20 at 14:47;  Stop 

6/7/20 at 14:48;  Status DC


Fentanyl Citrate (Fentanyl 2ml Vial) 100 mcg STK-MED ONCE .ROUTE ;  Start 6/7/20

at 14:47;  Stop 6/7/20 at 14:48;  Status DC


Flumazenil (Romazicon) 0.5 mg STK-MED ONCE IV ;  Start 6/7/20 at 14:48;  Stop 

6/7/20 at 14:48;  Status DC


Naloxone HCl (Narcan) 0.4 mg STK-MED ONCE .ROUTE ;  Start 6/7/20 at 14:48;  Stop

6/7/20 at 14:48;  Status DC


Lidocaine HCl (Lidocaine 1% 20ml Vial) 20 ml STK-MED ONCE .ROUTE ;  Start 6/7/20

at 14:48;  Stop 6/7/20 at 14:48;  Status DC


Midazolam HCl (Versed) 2 mg 1X  ONCE IV  Last administered on 6/7/20at 15:28;  

Start 6/7/20 at 15:00;  Stop 6/7/20 at 15:01;  Status DC


Fentanyl Citrate (Fentanyl 2ml Vial) 100 mcg 1X  ONCE IV  Last administered on 

6/7/20at 15:28;  Start 6/7/20 at 15:00;  Stop 6/7/20 at 15:01;  Status DC


Lidocaine HCl (Lidocaine 1% 20ml Vial) 20 ml 1X  ONCE INJ  Last administered on 

6/7/20at 15:30;  Start 6/7/20 at 15:00;  Stop 6/7/20 at 15:01;  Status DC


Sodium Chloride 1,000 ml @  100 mls/hr Q10H IV  Last administered on 6/16/20at 

07:30;  Start 6/7/20 at 20:00;  Stop 6/16/20 at 11:26;  Status DC


Sodium Bicarbonate (Sodium Bicarb Adult 8.4% Syr) 50 meq 1X  ONCE IV  Last 

administered on 6/7/20at 21:47;  Start 6/7/20 at 22:00;  Stop 6/7/20 at 22:01;  

Status DC


Potassium Acetate 40 meq/Magnesium Sulfate 5 meq/ Multivitamins 10 ml/Chromium/ 

Copper/Manganese/ Seleni/Zn 1 ml/ Insulin Human Regular 30 unit/ Total 

Parenteral Nutrition/Amino Acids/Dextrose/ Fat Emulsion Intravenous 1,920 ml @  

80 mls/hr TPN  CONT IV  Last administered on 6/8/20at 22:28;  Start 6/8/20 at 

22:00;  Stop 6/9/20 at 21:59;  Status DC


Sodium Chloride 500 ml @  500 mls/hr 1X  ONCE IV  Last administered on 6/9/20at 

06:39;  Start 6/9/20 at 06:45;  Stop 6/9/20 at 07:44;  Status DC


Potassium Acetate 40 meq/Magnesium Sulfate 5 meq/ Multivitamins 10 ml/Chromium/ 

Copper/Manganese/ Seleni/Zn 1 ml/ Insulin Human Regular 30 unit/ Total 

Parenteral Nutrition/Amino Acids/Dextrose/ Fat Emulsion Intravenous 1,920 ml @  

80 mls/hr TPN  CONT IV  Last administered on 6/9/20at 22:03;  Start 6/9/20 at 

22:00;  Stop 6/10/20 at 21:59;  Status DC


Metoprolol Tartrate (Lopressor Vial) 5 mg PRN Q6HRS  PRN IVP HYPERTENSION Last 

administered on 7/25/20at 09:44;  Start 6/10/20 at 09:00


Potassium Acetate 40 meq/Magnesium Sulfate 5 meq/ Multivitamins 10 ml/Chromium/ 

Copper/Manganese/ Seleni/Zn 1 ml/ Insulin Human Regular 30 unit/ Total 

Parenteral Nutrition/Amino Acids/Dextrose/ Fat Emulsion Intravenous 1,920 ml @  

80 mls/hr TPN  CONT IV  Last administered on 6/10/20at 21:26;  Start 6/10/20 at 

22:00;  Stop 6/11/20 at 21:59;  Status DC


Potassium Acetate 40 meq/Magnesium Sulfate 5 meq/ Multivitamins 10 ml/Chromium/ 

Copper/Manganese/ Seleni/Zn 1 ml/ Insulin Human Regular 30 unit/ Total 

Parenteral Nutrition/Amino Acids/Dextrose/ Fat Emulsion Intravenous 1,920 ml @  

80 mls/hr TPN  CONT IV  Last administered on 6/11/20at 23:23;  Start 6/11/20 at 

22:00;  Stop 6/12/20 at 21:59;  Status DC


Potassium Acetate 40 meq/Magnesium Sulfate 5 meq/ Multivitamins 10 ml/Chromium/ 

Copper/Manganese/ Seleni/Zn 1 ml/ Insulin Human Regular 30 unit/ Total Parent

eral Nutrition/Amino Acids/Dextrose/ Fat Emulsion Intravenous 1,920 ml @  80 

mls/hr TPN  CONT IV  Last administered on 6/12/20at 21:35;  Start 6/12/20 at 

22:00;  Stop 6/13/20 at 21:59;  Status DC


Furosemide (Lasix) 20 mg 1X  ONCE IVP  Last administered on 6/13/20at 06:26;  

Start 6/13/20 at 06:15;  Stop 6/13/20 at 06:16;  Status DC


Methylprednisolone Sodium Succinate (SOLU-Medrol 125MG VIAL) 125 mg 1X  ONCE IV 

Last administered on 6/13/20at 06:26;  Start 6/13/20 at 06:15;  Stop 6/13/20 at 

06:16;  Status DC


Albuterol/ Ipratropium (Duoneb) 3 ml Q4HRS NEB  Last administered on 7/28/20at 

08:09;  Start 6/13/20 at 08:00


Fentanyl Citrate 30 ml @ 0 mls/hr CONT  PRN IV SEE PROTOCOL Last administered on

7/4/20at 08:03;  Start 6/13/20 at 06:00;  Stop 7/4/20 at 12:42;  Status DC


Propofol 100 ml @ 0 mls/hr CONT  PRN IV SEE PROTOCOL Last administered on 

6/20/20at 23:50;  Start 6/13/20 at 06:00


Fentanyl Citrate (Fentanyl 2ml Vial) 25 mcg PRN Q1HR  PRN IV SEE COMMENTS Last 

administered on 7/28/20at 06:02;  Start 6/13/20 at 06:00


Fentanyl Citrate (Fentanyl 2ml Vial) 50 mcg PRN Q1HR  PRN IV SEE COMMENTS Last 

administered on 7/26/20at 17:35;  Start 6/13/20 at 06:00


Chlorhexidine Gluconate (Peridex) 15 ml BID MM ;  Start 6/13/20 at 09:00;  Stop 

6/13/20 at 07:58;  Status DC


Potassium Acetate 40 meq/Magnesium Sulfate 5 meq/ Multivitamins 10 ml/Chromium/ 

Copper/Manganese/ Seleni/Zn 1 ml/ Insulin Human Regular 30 unit/ Total 

Parenteral Nutrition/Amino Acids/Dextrose/ Fat Emulsion Intravenous 1,920 ml @  

80 mls/hr TPN  CONT IV  Last administered on 6/13/20at 21:19;  Start 6/13/20 at 

22:00;  Stop 6/14/20 at 21:59;  Status DC


Acetylcysteine (Mucomyst 20% Resp Treatment) 600 mg BID NEB  Last administered 

on 6/19/20at 09:33;  Start 6/13/20 at 21:00;  Stop 6/19/20 at 10:39;  Status DC


Magnesium Sulfate 100 ml @  25 mls/hr 1X  ONCE IV  Last administered on 

6/13/20at 15:48;  Start 6/13/20 at 15:45;  Stop 6/13/20 at 19:44;  Status DC


Potassium Acetate 40 meq/Magnesium Sulfate 5 meq/ Multivitamins 10 ml/Chromium/ 

Copper/Manganese/ Seleni/Zn 1 ml/ Insulin Human Regular 30 unit/ Total 

Parenteral Nutrition/Amino Acids/Dextrose/ Fat Emulsion Intravenous 1,920 ml @  

80 mls/hr TPN  CONT IV  Last administered on 6/14/20at 21:35;  Start 6/14/20 at 

22:00;  Stop 6/15/20 at 21:59;  Status DC


Potassium Chloride/Water 100 ml @  100 mls/hr Q1H IV  Last administered on 

6/15/20at 08:31;  Start 6/15/20 at 07:00;  Stop 6/15/20 at 08:59;  Status DC


Potassium Acetate 40 meq/Magnesium Sulfate 5 meq/ Multivitamins 10 ml/Chromium/ 

Copper/Manganese/ Seleni/Zn 1 ml/ Insulin Human Regular 30 unit/ Total 

Parenteral Nutrition/Amino Acids/Dextrose/ Fat Emulsion Intravenous 1,920 ml @  

80 mls/hr TPN  CONT IV  Last administered on 6/15/20at 21:54;  Start 6/15/20 at 

22:00;  Stop 6/16/20 at 19:34;  Status DC


Lidocaine HCl (Buffered Lidocaine 1%) 3 ml STK-MED ONCE .ROUTE ;  Start 6/15/20 

at 12:14;  Stop 6/15/20 at 12:14;  Status DC


Lidocaine HCl (Buffered Lidocaine 1%) 3 ml 1X  ONCE IJ  Last administered on 

6/15/20at 13:11;  Start 6/15/20 at 13:00;  Stop 6/15/20 at 13:01;  Status DC


Magnesium Sulfate 50 ml @ 25 mls/hr 1X  ONCE IV ;  Start 6/16/20 at 08:15;  Stop

6/16/20 at 10:14;  Status DC


Potassium Acetate 40 meq/Magnesium Sulfate 10 meq/ Multivitamins 10 ml/Chromium/

Copper/Manganese/ Seleni/Zn 1 ml/ Insulin Human Regular 20 unit/ Total 

Parenteral Nutrition/Amino Acids/Dextrose/ Fat Emulsion Intravenous 1,920 ml @  

80 mls/hr TPN  CONT IV  Last administered on 6/16/20at 21:32;  Start 6/16/20 at 

22:00;  Stop 6/17/20 at 21:59;  Status DC


Potassium Chloride/Water 100 ml @  100 mls/hr Q1H IV  Last administered on 

6/17/20at 09:12;  Start 6/17/20 at 08:00;  Stop 6/17/20 at 09:59;  Status DC


Alteplase, Recombinant (Cathflo For Central Catheter Clearance) 4 mg 1X  ONCE 

INT CAT ;  Start 6/17/20 at 09:15;  Stop 6/17/20 at 09:16;  Status UNV


Alteplase, Recombinant (Cathflo For Central Catheter Clearance) 4 mg 1X  ONCE 

INT CAT ;  Start 6/17/20 at 09:15;  Stop 6/17/20 at 09:16;  Status UNV


Alteplase, Recombinant (Cathflo For Central Catheter Clearance) 4 mg 1X  ONCE 

INT CAT ;  Start 6/17/20 at 09:15;  Stop 6/17/20 at 09:16;  Status UNV


Alteplase, Recombinant 4 mg/ Sodium Chloride 20 ml @ 20 mls/hr 1X  ONCE IV  Last

administered on 6/17/20at 10:10;  Start 6/17/20 at 10:00;  Stop 6/17/20 at 

10:59;  Status DC


Alteplase, Recombinant 4 mg/ Sodium Chloride 20 ml @ 20 mls/hr 1X  ONCE IV  Last

administered on 6/17/20at 10:09;  Start 6/17/20 at 10:00;  Stop 6/17/20 at 

10:59;  Status DC


Alteplase, Recombinant 4 mg/ Sodium Chloride 20 ml @ 20 mls/hr 1X  ONCE IV  Last

administered on 6/17/20at 10:09;  Start 6/17/20 at 10:00;  Stop 6/17/20 at 

10:59;  Status DC


Potassium Acetate 60 meq/Magnesium Sulfate 10 meq/ Multivitamins 10 ml/Chromium/

Copper/Manganese/ Seleni/Zn 1 ml/ Insulin Human Regular 20 unit/ Total 

Parenteral Nutrition/Amino Acids/Dextrose/ Fat Emulsion Intravenous 1,920 ml @  

80 mls/hr TPN  CONT IV  Last administered on 6/17/20at 21:55;  Start 6/17/20 at 

22:00;  Stop 6/18/20 at 21:59;  Status DC


Albumin Human 500 ml @  125 mls/hr 1X  ONCE IV  Last administered on 6/18/20at 

12:01;  Start 6/18/20 at 11:15;  Stop 6/18/20 at 15:14;  Status DC


Sodium Chloride 500 ml @  500 mls/hr 1X  ONCE IV  Last administered on 6/18/20at

13:50;  Start 6/18/20 at 11:15;  Stop 6/18/20 at 12:14;  Status DC


Potassium Acetate 60 meq/Magnesium Sulfate 14 meq/ Multivitamins 10 ml/Chromium/

Copper/Manganese/ Seleni/Zn 1 ml/ Insulin Human Regular 20 unit/ Total 

Parenteral Nutrition/Amino Acids/Dextrose/ Fat Emulsion Intravenous 1,920 ml @  

80 mls/hr TPN  CONT IV  Last administered on 6/18/20at 22:26;  Start 6/18/20 at 

22:00;  Stop 6/19/20 at 21:59;  Status DC


Ciprofloxacin/ Dextrose 200 ml @  200 mls/hr Q12HR IV  Last administered on 

6/25/20at 08:27;  Start 6/18/20 at 21:00;  Stop 6/25/20 at 08:56;  Status DC


Albumin Human 250 ml @  62.5 mls/hr 1X  ONCE IV  Last administered on 6/19/20at 

11:09;  Start 6/19/20 at 11:00;  Stop 6/19/20 at 14:59;  Status DC


Furosemide (Lasix) 20 mg 1X  ONCE IVP  Last administered on 6/19/20at 14:52;  

Start 6/19/20 at 10:45;  Stop 6/19/20 at 10:49;  Status DC


Potassium Acetate 60 meq/Magnesium Sulfate 14 meq/ Multivitamins 10 ml/Chromium/

Copper/Manganese/ Seleni/Zn 1 ml/ Insulin Human Regular 15 unit/ Total 

Parenteral Nutrition/Amino Acids/Dextrose/ Fat Emulsion Intravenous 1,920 ml @  

80 mls/hr TPN  CONT IV  Last administered on 6/19/20at 22:08;  Start 6/19/20 at 

22:00;  Stop 6/20/20 at 21:59;  Status DC


Potassium Acetate 60 meq/Magnesium Sulfate 14 meq/ Multivitamins 10 ml/Chromium/

Copper/Manganese/ Seleni/Zn 1 ml/ Insulin Human Regular 15 unit/ Total 

Parenteral Nutrition/Amino Acids/Dextrose/ Fat Emulsion Intravenous 1,920 ml @  

80 mls/hr TPN  CONT IV  Last administered on 6/20/20at 22:12;  Start 6/20/20 at 

22:00;  Stop 6/21/20 at 21:59;  Status DC


Potassium Acetate 60 meq/Magnesium Sulfate 14 meq/ Multivitamins 10 ml/Chromium/

Copper/Manganese/ Seleni/Zn 1 ml/ Insulin Human Regular 15 unit/ Total 

Parenteral Nutrition/Amino Acids/Dextrose/ Fat Emulsion Intravenous 1,920 ml @  

80 mls/hr TPN  CONT IV  Last administered on 6/21/20at 22:22;  Start 6/21/20 at 

22:00;  Stop 6/22/20 at 21:59;  Status DC


Furosemide (Lasix) 20 mg 1X  ONCE IVP  Last administered on 6/22/20at 11:07;  

Start 6/22/20 at 10:30;  Stop 6/22/20 at 10:34;  Status DC


Potassium Acetate 60 meq/Magnesium Sulfate 14 meq/ Multivitamins 10 ml/Chromium/

Copper/Manganese/ Seleni/Zn 1 ml/ Insulin Human Regular 15 unit/ Sodium Chloride

20 meq/Total Parenteral Nutrition/Amino Acids/Dextrose/ Fat Emulsion Intravenous

1,920 ml @  80 mls/hr TPN  CONT IV  Last administered on 6/22/20at 21:54;  Start

6/22/20 at 22:00;  Stop 6/23/20 at 21:59;  Status DC


Potassium Acetate 30 meq/Magnesium Sulfate 14 meq/ Multivitamins 10 ml/Chromium/

Copper/Manganese/ Seleni/Zn 1 ml/ Insulin Human Regular 15 unit/ Sodium Chloride

20 meq/Potassium Chloride 30 meq/ Total Parenteral Nutrition/Amino 

Acids/Dextrose/ Fat Emulsion Intravenous 1,920 ml @  80 mls/hr TPN  CONT IV  

Last administered on 6/23/20at 21:46;  Start 6/23/20 at 22:00;  Stop 6/24/20 at 

21:59;  Status DC


Sodium Chloride 80 meq/Potassium Chloride 30 meq/ Potassium Acetate 30 

meq/Magnesium Sulfate 14 meq/ Multivitamins 10 ml/Chromium/ Copper/Manganese/ 

Seleni/Zn 1 ml/ Insulin Human Regular 15 unit/ Total Parenteral Nutrition/Amino 

Acids/Dextrose/ Fat Emulsion Intravenous 1,920 ml @  80 mls/hr TPN  CONT IV  

Last administered on 6/24/20at 22:33;  Start 6/24/20 at 22:00;  Stop 6/25/20 at 

21:59;  Status DC


Furosemide (Lasix) 40 mg 1X  ONCE IVP  Last administered on 6/24/20at 16:27;  

Start 6/24/20 at 15:30;  Stop 6/24/20 at 15:33;  Status DC


Albumin Human 250 ml @  62.5 mls/hr 1X  ONCE IV  Last administered on 6/24/20at 

16:27;  Start 6/24/20 at 15:30;  Stop 6/24/20 at 19:29;  Status DC


Sodium Chloride 80 meq/Potassium Chloride 30 meq/ Potassium Acetate 30 

meq/Magnesium Sulfate 14 meq/ Multivitamins 10 ml/Chromium/ Copper/Manganese/ 

Seleni/Zn 1 ml/ Insulin Human Regular 15 unit/ Total Parenteral Nutrition/Amino 

Acids/Dextrose/ Fat Emulsion Intravenous 1,920 ml @  80 mls/hr TPN  CONT IV  

Last administered on 6/25/20at 22:25;  Start 6/25/20 at 22:00;  Stop 6/26/20 at 

21:59;  Status DC


Sodium Chloride 80 meq/Potassium Chloride 30 meq/ Potassium Acetate 30 

meq/Magnesium Sulfate 14 meq/ Multivitamins 10 ml/Chromium/ Copper/Manganese/ 

Seleni/Zn 1 ml/ Insulin Human Regular 15 unit/ Total Parenteral Nutrition/Amino 

Acids/Dextrose/ Fat Emulsion Intravenous 1,920 ml @  80 mls/hr TPN  CONT IV  

Last administered on 6/26/20at 21:32;  Start 6/26/20 at 22:00;  Stop 6/27/20 at 

21:59;  Status DC


Sodium Chloride 80 meq/Potassium Chloride 30 meq/ Potassium Acetate 30 

meq/Magnesium Sulfate 14 meq/ Multivitamins 10 ml/Chromium/ Copper/Manganese/ 

Seleni/Zn 1 ml/ Insulin Human Regular 15 unit/ Total Parenteral Nutrition/Amino 

Acids/Dextrose/ Fat Emulsion Intravenous 1,920 ml @  80 mls/hr TPN  CONT IV  

Last administered on 6/27/20at 21:53;  Start 6/27/20 at 22:00;  Stop 6/28/20 at 

21:59;  Status DC


Acetylcysteine (Mucomyst 20% Resp Treatment) 600 mg RTBID NEB  Last administered

on 7/28/20at 08:00;  Start 6/27/20 at 12:00


Sodium Chloride 80 meq/Potassium Chloride 30 meq/ Potassium Acetate 30 

meq/Magnesium Sulfate 14 meq/ Multivitamins 10 ml/Chromium/ Copper/Manganese/ 

Seleni/Zn 1 ml/ Insulin Human Regular 15 unit/ Total Parenteral Nutrition/Amino 

Acids/Dextrose/ Fat Emulsion Intravenous 1,920 ml @  80 mls/hr TPN  CONT IV  

Last administered on 6/28/20at 22:06;  Start 6/28/20 at 22:00;  Stop 6/29/20 at 

21:59;  Status DC


Meropenem 500 mg/ Sodium Chloride 50 ml @  100 mls/hr Q6HRS IV  Last 

administered on 7/21/20at 06:15;  Start 6/28/20 at 18:00;  Stop 7/21/20 at 

08:23;  Status DC


Daptomycin 500 mg/ Sodium Chloride 50 ml @  100 mls/hr Q24H IV  Last 

administered on 7/6/20at 21:47;  Start 6/28/20 at 19:00;  Stop 7/7/20 at 08:13; 

Status DC


Sodium Chloride 80 meq/Potassium Chloride 30 meq/ Potassium Acetate 30 

meq/Magnesium Sulfate 14 meq/ Multivitamins 10 ml/Chromium/ Copper/Manganese/ 

Seleni/Zn 1 ml/ Insulin Human Regular 15 unit/ Total Parenteral Nutrition/Amino 

Acids/Dextrose/ Fat Emulsion Intravenous 1,920 ml @  80 mls/hr TPN  CONT IV  Las

t administered on 6/29/20at 22:09;  Start 6/29/20 at 22:00;  Stop 6/30/20 at 

21:59;  Status DC


Heparin Sodium (Porcine) 1000 unit/Sodium Chloride 1,001 ml @  1,001 mls/hr 1X  

ONCE IRR ;  Start 6/30/20 at 06:00;  Stop 6/30/20 at 06:59;  Status DC


Propofol (Diprivan) 200 mg STK-MED ONCE IV ;  Start 6/30/20 at 07:44;  Stop 

6/30/20 at 07:44;  Status DC


Lidocaine HCl (Lidocaine Pf 2% Vial) 5 ml STK-MED ONCE .ROUTE ;  Start 6/30/20 

at 07:44;  Stop 6/30/20 at 07:44;  Status DC


Fentanyl Citrate (Fentanyl 2ml Vial) 100 mcg STK-MED ONCE .ROUTE ;  Start 

6/30/20 at 07:44;  Stop 6/30/20 at 07:44;  Status DC


Rocuronium Bromide (Zemuron) 100 mg STK-MED ONCE .ROUTE ;  Start 6/30/20 at 

07:44;  Stop 6/30/20 at 07:44;  Status DC


Micafungin Sodium 100 mg/Dextrose 100 ml @  100 mls/hr Q24H IV  Last 

administered on 7/28/20at 08:22;  Start 6/30/20 at 08:30


Bupivacaine HCl/ Epinephrine Bitart (Sensorcain-Epi 0.5%-1:[id number] Mpf) 30 ml 

STK-MED ONCE .ROUTE ;  Start 6/30/20 at 08:34;  Stop 6/30/20 at 08:35;  Status 

DC


Iohexol (Omnipaque 300 Mg/ml) 50 ml STK-MED ONCE .ROUTE  Last administered on 

6/30/20at 13:30;  Start 6/30/20 at 08:35;  Stop 6/30/20 at 08:35;  Status DC


Sodium Chloride 80 meq/Potassium Chloride 30 meq/ Potassium Acetate 30 

meq/Magnesium Sulfate 14 meq/ Multivitamins 10 ml/Chromium/ Copper/Manganese/ 

Seleni/Zn 1 ml/ Insulin Human Regular 15 unit/ Total Parenteral Nutrition/Amino 

Acids/Dextrose/ Fat Emulsion Intravenous 1,920 ml @  80 mls/hr TPN  CONT IV  

Last administered on 7/1/20at 01:22;  Start 6/30/20 at 22:00;  Stop 7/1/20 at 

21:59;  Status DC


Phenylephrine HCl (Ken-Synephrine Inj) 10 mg STK-MED ONCE .ROUTE ;  Start 

6/30/20 at 10:15;  Stop 6/30/20 at 10:15;  Status DC


Desflurane (Suprane) 90 ml STK-MED ONCE IH ;  Start 6/30/20 at 10:18;  Stop 

6/30/20 at 10:19;  Status DC


Albumin Human 500 ml @  As Directed STK-MED ONCE IV ;  Start 6/30/20 at 11:06;  

Stop 6/30/20 at 11:06;  Status DC


Vasopressin (Vasostrict) 20 unit STK-MED ONCE .ROUTE ;  Start 6/30/20 at 12:23; 

Stop 6/30/20 at 12:23;  Status DC


Phenylephrine HCl (Ken-Synephrine Inj) 10 mg STK-MED ONCE .ROUTE ;  Start 

6/30/20 at 13:33;  Stop 6/30/20 at 13:33;  Status DC


Phenylephrine HCl (Ken-Synephrine Inj) 10 mg STK-MED ONCE .ROUTE ;  Start 

6/30/20 at 13:33;  Stop 6/30/20 at 13:33;  Status DC


Ondansetron HCl (Zofran) 4 mg STK-MED ONCE .ROUTE ;  Start 6/30/20 at 13:33;  

Stop 6/30/20 at 13:33;  Status DC


Enoxaparin Sodium (Lovenox 40mg Syringe) 40 mg Q24H SQ  Last administered on 

7/28/20at 08:22;  Start 7/1/20 at 08:00


Sodium Chloride (Normal Saline Flush) 3 ml QSHIFT  PRN IV AFTER MEDS AND BLOOD 

DRAWS;  Start 6/30/20 at 14:45


Naloxone HCl (Narcan) 0.4 mg PRN Q2MIN  PRN IV SEE INSTRUCTIONS;  Start 6/30/20 

at 14:45


Sodium Chloride 1,000 ml @  25 mls/hr Q24H IV  Last administered on 7/27/20at 

14:33;  Start 6/30/20 at 14:33


Morphine Sulfate (Morphine Sulfate) 1 mg PRN Q1HR  PRN IV PAIN Last administered

on 7/25/20at 18:28;  Start 6/30/20 at 14:45


Midazolam HCl 100 mg/Sodium Chloride 100 ml @ 1 mls/hr CONT  PRN IV SEE I/O 

RECORD Last administered on 7/3/20at 18:48;  Start 6/30/20 at 14:45


Phenylephrine HCl (PHENYLEPHRINE in 0.9% NACL PF) 1 mg STK-MED ONCE IV ;  Start 

6/30/20 at 14:44;  Stop 6/30/20 at 14:45;  Status DC


Ephedrine Sulfate (ePHEDrine PF IN SALINE SYRINGE) 50 mg STK-MED ONCE IV ;  

Start 6/30/20 at 14:45;  Stop 6/30/20 at 14:45;  Status DC


Vasopressin 20 unit/Dextrose 101 ml @  12 mls/hr CONT  PRN IV SEE I/O RECORD 

Last administered on 7/7/20at 04:17;  Start 6/30/20 at 15:30


Sodium Chloride 1,000 ml @  1,000 mls/hr 1X  ONCE IV  Last administered on 

6/30/20at 15:42;  Start 6/30/20 at 15:45;  Stop 6/30/20 at 16:44;  Status DC


Albumin Human 500 ml @  125 mls/hr 1X  ONCE IV ;  Start 6/30/20 at 16:00;  Stop 

6/30/20 at 19:59;  Status DC


Albumin Human 500 ml @  125 mls/hr PRN Q1HR  PRN IV PER PROTOCOL;  Start 6/30/20

at 15:45


Magnesium Sulfate 50 ml @ 25 mls/hr 1X  ONCE IV  Last administered on 6/30/20at 

17:02;  Start 6/30/20 at 16:30;  Stop 6/30/20 at 18:29;  Status DC


Sodium Bicarbonate (Sodium Bicarb Adult 8.4% Syr) 50 meq STK-MED ONCE .ROUTE ;  

Start 6/30/20 at 16:20;  Stop 6/30/20 at 16:20;  Status DC


Sodium Bicarbonate (Sodium Bicarb Adult 8.4% Syr) 100 meq 1X  ONCE IV  Last 

administered on 6/30/20at 17:07;  Start 6/30/20 at 16:30;  Stop 6/30/20 at 

16:31;  Status DC


Sodium Bicarbonate 150 meq/Dextrose 1,150 ml @  75 mls/hr 1X  ONCE IV  Last 

administered on 6/30/20at 20:02;  Start 6/30/20 at 16:30;  Stop 7/1/20 at 07:49;

 Status DC


Sodium Chloride 80 meq/Potassium Chloride 30 meq/ Potassium Acetate 30 

meq/Magnesium Sulfate 14 meq/ Multivitamins 10 ml/Chromium/ Copper/Manganese/ 

Seleni/Zn 1 ml/ Insulin Human Regular 15 unit/ Total Parenteral Nutrition/Amino 

Acids/Dextrose/ Fat Emulsion Intravenous 1,920 ml @  80 mls/hr TPN  CONT IV  

Last administered on 7/1/20at 23:05;  Start 7/1/20 at 22:00;  Stop 7/2/20 at 

21:59;  Status DC


Sodium Chloride 100 meq/Potassium Chloride 30 meq/ Potassium Acetate 30 

meq/Magnesium Sulfate 12 meq/ Multivitamins 10 ml/Chromium/ Copper/Manganese/ 

Seleni/Zn 1 ml/ Insulin Human Regular 15 unit/ Total Parenteral Nutrition/Amino 

Acids/Dextrose/ Fat Emulsion Intravenous 1,920 ml @  80 mls/hr TPN  CONT IV  

Last administered on 7/2/20at 21:52;  Start 7/2/20 at 22:00;  Stop 7/3/20 at 

21:59;  Status DC


Sodium Chloride 100 meq/Potassium Chloride 30 meq/ Potassium Acetate 30 

meq/Magnesium Sulfate 12 meq/ Multivitamins 10 ml/Chromium/ Copper/Manganese/ 

Seleni/Zn 1 ml/ Insulin Human Regular 15 unit/ Total Parenteral Nutrition/Amino 

Acids/Dextrose/ Fat Emulsion Intravenous 1,920 ml @  80 mls/hr TPN  CONT IV  

Last administered on 7/3/20at 21:46;  Start 7/3/20 at 22:00;  Stop 7/4/20 at 

21:59;  Status DC


Sodium Chloride 100 meq/Potassium Chloride 30 meq/ Potassium Acetate 30 

meq/Magnesium Sulfate 12 meq/ Multivitamins 10 ml/Chromium/ Copper/Manganese/ 

Seleni/Zn 1 ml/ Insulin Human Regular 15 unit/ Total Parenteral Nutrition/Amino 

Acids/Dextrose/ Fat Emulsion Intravenous 1,800 ml @  75 mls/hr TPN  CONT IV  

Last administered on 7/4/20at 22:04;  Start 7/4/20 at 22:00;  Stop 7/5/20 at 

21:59;  Status DC


Fentanyl Citrate 55 ml @ 0 mls/hr CONT  PRN IV SEE COMMENTS Last administered on

7/6/20at 23:55;  Start 7/4/20 at 13:00;  Stop 7/9/20 at 17:28;  Status DC


Sodium Chloride 100 meq/Potassium Chloride 30 meq/ Potassium Acetate 30 

meq/Magnesium Sulfate 12 meq/ Multivitamins 10 ml/Chromium/ Copper/Manganese/ 

Seleni/Zn 1 ml/ Insulin Human Regular 15 unit/ Total Parenteral Nutrition/Amino 

Acids/Dextrose/ Fat Emulsion Intravenous 1,680 ml @  70 mls/hr TPN  CONT IV  

Last administered on 7/5/20at 21:23;  Start 7/5/20 at 22:00;  Stop 7/6/20 at 

21:59;  Status DC


Sodium Chloride 110 meq/Potassium Chloride 30 meq/ Potassium Acetate 30 

meq/Magnesium Sulfate 15 meq/ Multivitamins 10 ml/Chromium/ Copper/Manganese/ 

Seleni/Zn 1 ml/ Insulin Human Regular 15 unit/ Total Parenteral Nutrition/Amino 

Acids/Dextrose/ Fat Emulsion Intravenous 1,680 ml @  70 mls/hr TPN  CONT IV  

Last administered on 7/6/20at 21:48;  Start 7/6/20 at 22:00;  Stop 7/7/20 at 

21:59;  Status DC


Sodium Chloride 110 meq/Potassium Chloride 30 meq/ Potassium Acetate 30 

meq/Magnesium Sulfate 15 meq/ Multivitamins 10 ml/Chromium/ Copper/Manganese/ 

Seleni/Zn 1 ml/ Insulin Human Regular 15 unit/ Total Parenteral Nutrition/Amino 

Acids/Dextrose/ Fat Emulsion Intravenous 1,680 ml @  70 mls/hr TPN  CONT IV  

Last administered on 7/7/20at 21:33;  Start 7/7/20 at 22:00;  Stop 7/8/20 at 

21:59;  Status DC


Sodium Chloride 110 meq/Potassium Chloride 30 meq/ Potassium Acetate 30 

meq/Magnesium Sulfate 15 meq/ Multivitamins 10 ml/Chromium/ Copper/Manganese/ 

Seleni/Zn 1 ml/ Insulin Human Regular 15 unit/ Total Parenteral Nutrition/Amino 

Acids/Dextrose/ Fat Emulsion Intravenous 1,680 ml @  70 mls/hr TPN  CONT IV  

Last administered on 7/8/20at 21:51;  Start 7/8/20 at 22:00;  Stop 7/9/20 at 

21:59;  Status DC


Sodium Chloride 90 meq/Potassium Chloride 30 meq/ Potassium Acetate 30 

meq/Magnesium Sulfate 15 meq/ Multivitamins 10 ml/Chromium/ Copper/Manganese/ 

Seleni/Zn 1 ml/ Insulin Human Regular 15 unit/ Total Parenteral Nutrition/Amino 

Acids/Dextrose/ Fat Emulsion Intravenous 1,680 ml @  70 mls/hr TPN  CONT IV  

Last administered on 7/9/20at 22:38;  Start 7/9/20 at 22:00;  Stop 7/10/20 at 

21:59;  Status DC


Fentanyl Citrate 30 ml @ 0 mls/hr CONT  PRN IV SEE I/O RECORD;  Start 7/9/20 at 

17:30


Fentanyl (Duragesic 12mcg/ Hr Patch) 1 patch Q3DAYS TD  Last administered on 

7/28/20at 08:21;  Start 7/10/20 at 09:00


Sodium Chloride 90 meq/Potassium Chloride 30 meq/ Potassium Acetate 30 

meq/Magnesium Sulfate 15 meq/ Multivitamins 10 ml/Chromium/ Copper/Manganese/ 

Seleni/Zn 1 ml/ Insulin Human Regular 15 unit/ Total Parenteral Nutrition/Amino 

Acids/Dextrose/ Fat Emulsion Intravenous 1,680 ml @  70 mls/hr TPN  CONT IV  

Last administered on 7/10/20at 21:59;  Start 7/10/20 at 22:00;  Stop 7/11/20 at 

21:59;  Status DC


Sodium Chloride 90 meq/Potassium Chloride 30 meq/ Potassium Acetate 30 

meq/Magnesium Sulfate 15 meq/ Multivitamins 10 ml/Chromium/ Copper/Manganese/ 

Seleni/Zn 1 ml/ Insulin Human Regular 15 unit/ Total Parenteral Nutrition/Amino 

Acids/Dextrose/ Fat Emulsion Intravenous 1,680 ml @  70 mls/hr TPN  CONT IV  

Last administered on 7/11/20at 21:35;  Start 7/11/20 at 22:00;  Stop 7/12/20 at 

21:59;  Status DC


Vancomycin HCl (Vanco Per Pharmacy) 1 each PRN DAILY  PRN MC SEE COMMENTS Last 

administered on 7/14/20at 02:46;  Start 7/12/20 at 09:15;  Stop 7/15/20 at 

07:41;  Status DC


Ciprofloxacin/ Dextrose 200 ml @  200 mls/hr Q12HR IV  Last administered on 

7/20/20at 21:02;  Start 7/12/20 at 10:00;  Stop 7/21/20 at 08:20;  Status DC


Vancomycin HCl 2 gm/Sodium Chloride 500 ml @  250 mls/hr 1X  ONCE IV  Last 

administered on 7/12/20at 10:34;  Start 7/12/20 at 10:00;  Stop 7/12/20 at 

11:59;  Status DC


Sodium Chloride 90 meq/Potassium Chloride 30 meq/ Potassium Acetate 30 

meq/Magnesium Sulfate 15 meq/ Multivitamins 10 ml/Chromium/ Copper/Manganese/ 

Seleni/Zn 1 ml/ Insulin Human Regular 15 unit/ Total Parenteral Nutrition/Amino 

Acids/Dextrose/ Fat Emulsion Intravenous 1,680 ml @  70 mls/hr TPN  CONT IV  

Last administered on 7/12/20at 22:02;  Start 7/12/20 at 22:00;  Stop 7/13/20 at 

21:59;  Status DC


Diphenhydramine HCl (Benadryl) 25 mg 1X  ONCE IVP  Last administered on 

7/12/20at 14:26;  Start 7/12/20 at 14:30;  Stop 7/12/20 at 14:31;  Status DC


Vancomycin HCl 1.5 gm/Sodium Chloride 500 ml @  250 mls/hr Q8H IV  Last adm

inistered on 7/13/20at 03:08;  Start 7/12/20 at 18:30;  Stop 7/13/20 at 12:24;  

Status DC


Vancomycin HCl (Vancomycin Trough Level) 1 each 1X  ONCE MC  Last administered 

on 7/13/20at 10:00;  Start 7/13/20 at 10:00;  Stop 7/13/20 at 10:01;  Status DC


Sodium Chloride 90 meq/Potassium Chloride 30 meq/ Potassium Acetate 30 

meq/Magnesium Sulfate 15 meq/ Multivitamins 10 ml/Chromium/ Copper/Manganese/ 

Seleni/Zn 1 ml/ Insulin Human Regular 15 unit/ Total Parenteral Nutrition/Amino 

Acids/Dextrose/ Fat Emulsion Intravenous 1,680 ml @  70 mls/hr TPN  CONT IV  

Last administered on 7/13/20at 22:13;  Start 7/13/20 at 22:00;  Stop 7/14/20 at 

21:59;  Status DC


Vancomycin HCl (Vancomycin Random Level) 1 each 1X  ONCE MC  Last administered 

on 7/14/20at 01:00;  Start 7/14/20 at 01:00;  Stop 7/14/20 at 01:01;  Status DC


Vancomycin HCl 1.5 gm/Sodium Chloride 500 ml @  250 mls/hr Q12H IV  Last 

administered on 7/14/20at 22:07;  Start 7/14/20 at 10:00;  Stop 7/15/20 at 

07:41;  Status DC


Vancomycin HCl (Vancomycin Trough Level) 1 each 1X  ONCE MC ;  Start 7/15/20 at 

09:30;  Stop 7/15/20 at 09:31;  Status Cancel


Sodium Chloride 90 meq/Potassium Chloride 30 meq/ Potassium Acetate 30 

meq/Magnesium Sulfate 15 meq/ Multivitamins 10 ml/Chromium/ Copper/Manganese/ 

Seleni/Zn 1 ml/ Insulin Human Regular 15 unit/ Total Parenteral Nutrition/Amino 

Acids/Dextrose/ Fat Emulsion Intravenous 1,680 ml @  70 mls/hr TPN  CONT IV  

Last administered on 7/14/20at 22:08;  Start 7/14/20 at 22:00;  Stop 7/15/20 at 

21:59;  Status DC


Alteplase, Recombinant (Cathflo For Central Catheter Clearance) 1 mg 1X  ONCE 

INT CAT  Last administered on 7/14/20at 11:49;  Start 7/14/20 at 11:00;  Stop 

7/14/20 at 11:01;  Status DC


Daptomycin 500 mg/ Sodium Chloride 50 ml @  100 mls/hr Q24H IV  Last 

administered on 7/24/20at 08:25;  Start 7/15/20 at 09:00;  Stop 7/24/20 at 

08:38;  Status DC


Sodium Chloride 90 meq/Potassium Chloride 30 meq/ Potassium Acetate 30 

meq/Magnesium Sulfate 15 meq/ Multivitamins 10 ml/Chromium/ Copper/Manganese/ 

Seleni/Zn 1 ml/ Insulin Human Regular 15 unit/ Total Parenteral Nutrition/Amino 

Acids/Dextrose/ Fat Emulsion Intravenous 1,680 ml @  70 mls/hr TPN  CONT IV  

Last administered on 7/15/20at 22:55;  Start 7/15/20 at 22:00;  Stop 7/16/20 at 

21:59;  Status DC


Sodium Chloride 90 meq/Potassium Chloride 30 meq/ Potassium Acetate 30 

meq/Magnesium Sulfate 15 meq/ Multivitamins 10 ml/Chromium/ Copper/Manganese/ 

Seleni/Zn 1 ml/ Insulin Human Regular 15 unit/ Total Parenteral Nutrition/Amino 

Acids/Dextrose/ Fat Emulsion Intravenous 1,680 ml @  70 mls/hr TPN  CONT IV  

Last administered on 7/16/20at 22:06;  Start 7/16/20 at 22:00;  Stop 7/17/20 at 

21:59;  Status DC


Diphenhydramine HCl (Benadryl) 50 mg STK-MED ONCE .ROUTE ;  Start 7/16/20 at 

18:34;  Stop 7/16/20 at 18:35;  Status DC


Diphenhydramine HCl (Benadryl) 25 mg 1X  ONCE IM ;  Start 7/16/20 at 18:45;  

Stop 7/16/20 at 18:46;  Status DC


Diphenhydramine HCl (Benadryl) 25 mg 1X  ONCE IVP  Last administered on 

7/16/20at 18:56;  Start 7/16/20 at 19:00;  Stop 7/16/20 at 19:01;  Status DC


Alprazolam (Xanax) 0.5 mg PRN TID  PRN PO ANXIETY / AGITATION Last administered 

on 7/23/20at 11:49;  Start 7/17/20 at 08:00;  Stop 7/23/20 at 15:54;  Status DC


Sodium Chloride 110 meq/Potassium Chloride 30 meq/ Potassium Acetate 30 

meq/Magnesium Sulfate 15 meq/ Multivitamins 10 ml/Chromium/ Copper/Manganese/ 

Seleni/Zn 1 ml/ Insulin Human Regular 15 unit/ Total Parenteral Nutrition/Amino 

Acids/Dextrose/ Fat Emulsion Intravenous 1,680 ml @  70 mls/hr TPN  CONT IV  

Last administered on 7/17/20at 21:21;  Start 7/17/20 at 22:00;  Stop 7/18/20 at 

21:59;  Status DC


Sodium Chloride 110 meq/Potassium Chloride 30 meq/ Potassium Acetate 30 

meq/Magnesium Sulfate 15 meq/ Multivitamins 10 ml/Chromium/ Copper/Manganese/ 

Seleni/Zn 1 ml/ Insulin Human Regular 15 unit/ Total Parenteral Nutrition/Amino 

Acids/Dextrose/ Fat Emulsion Intravenous 1,680 ml @  70 mls/hr TPN  CONT IV  

Last administered on 7/18/20at 22:01;  Start 7/18/20 at 22:00;  Stop 7/19/20 at 

21:59;  Status DC


Alteplase, Recombinant (Cathflo For Central Catheter Clearance) 1 mg 1X  ONCE 

INT CAT  Last administered on 7/19/20at 08:23;  Start 7/19/20 at 08:15;  Stop 

7/19/20 at 08:16;  Status DC


Sodium Chloride 110 meq/Sodium Phosphate 10 mmol/ Potassium Chloride 30 meq/ 

Potassium Acetate 30 meq/Magnesium Sulfate 15 meq/ Multivitamins 10 ml/Chromium/

Copper/Manganese/ Seleni/Zn 1 ml/ Insulin Human Regular 15 unit/ Total 

Parenteral Nutrition/Amino Acids/Dextrose/ Fat Emulsion Intravenous 1,680 ml @  

70 mls/hr TPN  CONT IV  Last administered on 7/19/20at 22:03;  Start 7/19/20 at 

22:00;  Stop 7/20/20 at 21:59;  Status DC


Sodium Chloride 120 meq/Sodium Phosphate 10 mmol/ Potassium Chloride 30 meq/ 

Potassium Acetate 30 meq/Magnesium Sulfate 15 meq/ Multivitamins 10 ml/Chromium/

Copper/Manganese/ Seleni/Zn 1 ml/ Insulin Human Regular 15 unit/ Total Lisa

ral Nutrition/Amino Acids/Dextrose/ Fat Emulsion Intravenous 1,680 ml @  70 

mls/hr TPN  CONT IV  Last administered on 7/20/20at 22:08;  Start 7/20/20 at 

22:00;  Stop 7/21/20 at 21:59;  Status DC


Ceftazidime/ Avibactam 2.5 gm/ Sodium Chloride 100 ml @  50 mls/hr Q8HRS IV  

Last administered on 7/22/20at 05:32;  Start 7/21/20 at 14:00;  Stop 7/22/20 at 

07:48;  Status DC


Alteplase, Recombinant (Cathflo For Central Catheter Clearance) 1 mg 1X  ONCE 

INT CAT  Last administered on 7/21/20at 09:30;  Start 7/21/20 at 09:30;  Stop 

7/21/20 at 09:31;  Status DC


Sodium Chloride 120 meq/Sodium Phosphate 10 mmol/ Potassium Chloride 30 meq/ 

Potassium Acetate 30 meq/Magnesium Sulfate 15 meq/ Multivitamins 10 ml/Chromium/

Copper/Manganese/ Seleni/Zn 1 ml/ Insulin Human Regular 15 unit/ Total 

Parenteral Nutrition/Amino Acids/Dextrose/ Fat Emulsion Intravenous 1,680 ml @  

70 mls/hr TPN  CONT IV  Last administered on 7/21/20at 22:11;  Start 7/21/20 at 

22:00;  Stop 7/22/20 at 21:59;  Status DC


Iohexol (Omnipaque 300 Mg/ml) 75 ml 1X  ONCE IV  Last administered on 7/21/20at 

11:30;  Start 7/21/20 at 11:30;  Stop 7/21/20 at 11:31;  Status DC


Info (CONTRAST GIVEN -- Rx MONITORING) 1 each PRN DAILY  PRN MC SEE COMMENTS;  

Start 7/21/20 at 11:45;  Stop 7/23/20 at 11:44;  Status DC


Alteplase, Recombinant (Cathflo For Central Catheter Clearance) 1 mg 1X  ONCE 

INT CAT  Last administered on 7/21/20at 12:17;  Start 7/21/20 at 12:00;  Stop 

7/21/20 at 12:01;  Status DC


Cefepime HCl (Maxipime) 2 gm Q12HR IVP  Last administered on 7/22/20at 20:53;  S

tart 7/22/20 at 09:00;  Stop 7/23/20 at 07:30;  Status DC


Sodium Chloride 120 meq/Sodium Phosphate 10 mmol/ Potassium Chloride 30 meq/ 

Potassium Acetate 30 meq/Magnesium Sulfate 15 meq/ Multivitamins 10 ml/Chromium/

Copper/Manganese/ Seleni/Zn 1 ml/ Insulin Human Regular 15 unit/ Total 

Parenteral Nutrition/Amino Acids/Dextrose/ Fat Emulsion Intravenous 1,680 ml @  

70 mls/hr TPN  CONT IV  Last administered on 7/22/20at 21:25;  Start 7/22/20 at 

22:00;  Stop 7/23/20 at 21:59;  Status DC


Ceftazidime/ Avibactam 2.5 gm/ Sodium Chloride 250 ml @  125 mls/hr Q8HRS IV  

Last administered on 7/28/20at 06:21;  Start 7/23/20 at 08:00


Sodium Chloride 120 meq/Sodium Phosphate 10 mmol/ Potassium Chloride 30 meq/ 

Potassium Acetate 30 meq/Magnesium Sulfate 15 meq/ Multivitamins 10 ml/Chromium/

Copper/Manganese/ Seleni/Zn 1 ml/ Insulin Human Regular 15 unit/ Total 

Parenteral Nutrition/Amino Acids/Dextrose/ Fat Emulsion Intravenous 1,680 ml @  

70 mls/hr TPN  CONT IV  Last administered on 7/23/20at 22:35;  Start 7/23/20 at 

22:00;  Stop 7/24/20 at 21:59;  Status DC


Alprazolam (Xanax) 0.5 mg PRN QID  PRN PO ANXIETY / AGITATION Last administered 

on 7/27/20at 14:21;  Start 7/23/20 at 16:00


Acetaminophen/ Hydrocodone Bitart (Lortab 5/325) 1 tab PRN Q4HRS  PRN PO PAIN 

Last administered on 7/24/20at 19:34;  Start 7/23/20 at 16:00


Sodium Chloride 120 meq/Sodium Phosphate 10 mmol/ Potassium Chloride 30 meq/ 

Potassium Acetate 30 meq/Magnesium Sulfate 15 meq/ Multivitamins 10 ml/Chromium/

Copper/Manganese/ Seleni/Zn 1 ml/ Insulin Human Regular 15 unit/ Total 

Parenteral Nutrition/Amino Acids/Dextrose/ Fat Emulsion Intravenous 1,680 ml @  

70 mls/hr TPN  CONT IV  Last administered on 7/24/20at 21:50;  Start 7/24/20 at 

22:00;  Stop 7/25/20 at 21:59;  Status DC


Sodium Chloride 120 meq/Sodium Phosphate 10 mmol/ Potassium Chloride 30 meq/ 

Potassium Acetate 30 meq/Magnesium Sulfate 15 meq/ Multivitamins 10 ml/Chromium/

Copper/Manganese/ Seleni/Zn 1 ml/ Insulin Human Regular 15 unit/ Total 

Parenteral Nutrition/Amino Acids/Dextrose/ Fat Emulsion Intravenous 1,680 ml @  

70 mls/hr TPN  CONT IV  Last administered on 7/25/20at 22:14;  Start 7/25/20 at 

22:00;  Stop 7/26/20 at 21:59;  Status DC


Daptomycin 500 mg/ Sodium Chloride 50 ml @  100 mls/hr Q24H IV  Last 

administered on 7/27/20at 09:48;  Start 7/26/20 at 09:00


Sodium Chloride 120 meq/Sodium Phosphate 10 mmol/ Potassium Chloride 30 meq/ 

Potassium Acetate 30 meq/Magnesium Sulfate 15 meq/ Multivitamins 10 ml/Chromium/

Copper/Manganese/ Seleni/Zn 1 ml/ Insulin Human Regular 15 unit/ Total 

Parenteral Nutrition/Amino Acids/Dextrose/ Fat Emulsion Intravenous 1,680 ml @  

70 mls/hr TPN  CONT IV ;  Start 7/26/20 at 22:00;  Stop 7/27/20 at 21:59;  

Status Cancel


Amino Acids/ Glycerin/ Electrolytes 1,000 ml @  80 mls/hr [id number] IV  Last 

administered on 7/28/20at 08:20;  Start 7/26/20 at 10:15





Active Scripts


Active


Reported


Bisoprolol Fumarate 5 Mg Tablet 10 Mg PO DAILY


Vitals/I & O





Vital Sign - Last 24 Hours








 7/27/20 7/27/20 7/27/20 7/27/20





 11:00 11:39 12:09 12:09


 


Temp 97.6   





 97.6   


 


Pulse 130   


 


Resp 49 30  


 


B/P (MAP) 124/84 (97)   


 


Pulse Ox 98  95 95


 


O2 Delivery Nasal Cannula Nasal Cannula Nasal Cannula Nasal Cannula


 


O2 Flow Rate 2.0 2.0 2.0 2.0


 


    





    





 7/27/20 7/27/20 7/27/20 7/27/20





 15:00 16:00 18:00 18:16


 


Pulse 132  120 


 


Resp 41  30 


 


B/P (MAP) 146/87 (106)  117/76 (90) 


 


Pulse Ox 97 96 97 96


 


O2 Delivery Nasal Cannula Nasal Cannula Nasal Cannula Nasal Cannula


 


O2 Flow Rate 2.0 2.0 2.0 2.0





 7/27/20 7/27/20 7/27/20 7/27/20





 19:00 19:43 20:00 23:00


 


Temp 98.7   99.2





 98.7   99.2


 


Pulse 119   120


 


Resp 25   24


 


B/P (MAP) 130/73 (92)   115/81 (92)


 


Pulse Ox 98 96  97


 


O2 Delivery Nasal Cannula Nasal Cannula Nasal Cannula Nasal Cannula


 


O2 Flow Rate 2.0 2.0 2.0 2.0


 


    





    





 7/28/20 7/28/20 7/28/20 7/28/20





 00:11 03:00 04:36 08:15


 


Temp  99.1  





  99.1  


 


Pulse  122  


 


Resp  34  


 


B/P (MAP)  121/72 (88)  


 


Pulse Ox 98 95 96 96


 


O2 Delivery Nasal Cannula Nasal Cannula Nasal Cannula Nasal Cannula


 


O2 Flow Rate 2.0 2.0 2.0 2.0


 


    





    





 7/28/20   





 08:21   


 


Resp 31   


 


Pulse Ox 95   


 


O2 Delivery Nasal Cannula   


 


O2 Flow Rate 2.0   














Intake and Output   


 


 7/27/20 7/27/20 7/28/20





 15:00 23:00 07:00


 


Intake Total   2387 ml


 


Output Total 685 ml 940 ml 960 ml


 


Balance -685 ml -940 ml 1427 ml











Justicifation of Admission Dx:


Justifications for Admission:


Justification of Admission Dx:  Yes











GRAEME MASSEY MD             Jul 28, 2020 09:19

## 2020-07-28 NOTE — NUR
Paged Dr. Flores because pt's left ROBERT bulb will not keep suction. Dr. Flores paged back and 
said to put a ostomy bag over top of the site and maintain it best we can.

## 2020-07-28 NOTE — PDOC
G I PROGRESS NOTE


Subjective


Up in chair.


Objective


Discussed with staff.  Still fair amount out ROBERT's.  Less out Mur[place].


Physical Exam


Viewed from door.


Multiple drains remain.


On NC.


Review of Relevant


I have reviewed the following items josy (where applicable) has been applied.


Labs





Laboratory Tests








Test


 7/26/20


17:39 7/27/20


00:21 7/27/20


04:10 7/27/20


04:15


 


Glucose (Fingerstick)


 117 mg/dL


(70-99) 114 mg/dL


(70-99) 


 





 


White Blood Count


 


 


 12.1 x10^3/uL


(4.0-11.0) 





 


Red Blood Count


 


 


 2.71 x10^6/uL


(3.50-5.40) 





 


Hemoglobin


 


 


 7.7 g/dL


(12.0-15.5) 





 


Hematocrit


 


 


 23.2 %


(36.0-47.0) 





 


Mean Corpuscular Volume   85 fL ()  


 


Mean Corpuscular Hemoglobin   28 pg (25-35)  


 


Mean Corpuscular Hemoglobin


Concent 


 


 33 g/dL


(31-37) 





 


Red Cell Distribution Width


 


 


 16.0 %


(11.5-14.5) 





 


Platelet Count


 


 


 563 x10^3/uL


(140-400) 





 


Neutrophils (%) (Auto)   78 % (31-73)  


 


Lymphocytes (%) (Auto)   12 % (24-48)  


 


Monocytes (%) (Auto)   8 % (0-9)  


 


Eosinophils (%) (Auto)   2 % (0-3)  


 


Basophils (%) (Auto)   1 % (0-3)  


 


Neutrophils # (Auto)


 


 


 9.4 x10^3/uL


(1.8-7.7) 





 


Lymphocytes # (Auto)


 


 


 1.4 x10^3/uL


(1.0-4.8) 





 


Monocytes # (Auto)


 


 


 0.9 x10^3/uL


(0.0-1.1) 





 


Eosinophils # (Auto)


 


 


 0.3 x10^3/uL


(0.0-0.7) 





 


Basophils # (Auto)


 


 


 0.1 x10^3/uL


(0.0-0.2) 





 


Sodium Level


 


 


 


 135 mmol/L


(136-145)


 


Potassium Level


 


 


 


 4.6 mmol/L


(3.5-5.1)


 


Chloride Level


 


 


 


 103 mmol/L


()


 


Carbon Dioxide Level


 


 


 


 25 mmol/L


(21-32)


 


Anion Gap    7 (6-14) 


 


Blood Urea Nitrogen


 


 


 


 16 mg/dL


(7-20)


 


Creatinine


 


 


 


 0.7 mg/dL


(0.6-1.0)


 


Estimated GFR


(Cockcroft-Gault) 


 


 


 88.9 





 


BUN/Creatinine Ratio    23 (6-20) 


 


Glucose Level


 


 


 


 114 mg/dL


(70-99)


 


Calcium Level


 


 


 


 10.9 mg/dL


(8.5-10.1)


 


Total Bilirubin


 


 


 


 0.5 mg/dL


(0.2-1.0)


 


Aspartate Amino Transf


(AST/SGOT) 


 


 


 18 U/L (15-37) 





 


Alanine Aminotransferase


(ALT/SGPT) 


 


 


 13 U/L (14-59) 





 


Alkaline Phosphatase


 


 


 


 123 U/L


()


 


Total Protein


 


 


 


 5.4 g/dL


(6.4-8.2)


 


Albumin


 


 


 


 1.1 g/dL


(3.4-5.0)


 


Albumin/Globulin Ratio    0.3 (1.0-1.7) 


 


Test


 7/27/20


06:10 7/27/20


12:46 7/27/20


17:29 7/27/20


23:53


 


Glucose (Fingerstick)


 116 mg/dL


(70-99) 141 mg/dL


(70-99) 135 mg/dL


(70-99) 106 mg/dL


(70-99)


 


Test


 7/28/20


05:45 7/28/20


06:23 


 





 


White Blood Count


 16.6 x10^3/uL


(4.0-11.0) 


 


 





 


Red Blood Count


 2.90 x10^6/uL


(3.50-5.40) 


 


 





 


Hemoglobin


 8.2 g/dL


(12.0-15.5) 


 


 





 


Hematocrit


 24.4 %


(36.0-47.0) 


 


 





 


Mean Corpuscular Volume 84 fL ()    


 


Mean Corpuscular Hemoglobin 28 pg (25-35)    


 


Mean Corpuscular Hemoglobin


Concent 34 g/dL


(31-37) 


 


 





 


Red Cell Distribution Width


 15.8 %


(11.5-14.5) 


 


 





 


Platelet Count


 576 x10^3/uL


(140-400) 


 


 





 


Neutrophils (%) (Auto) 84 % (31-73)    


 


Lymphocytes (%) (Auto) 10 % (24-48)    


 


Monocytes (%) (Auto) 4 % (0-9)    


 


Eosinophils (%) (Auto) 2 % (0-3)    


 


Basophils (%) (Auto) 1 % (0-3)    


 


Neutrophils # (Auto)


 13.9 x10^3/uL


(1.8-7.7) 


 


 





 


Lymphocytes # (Auto)


 1.6 x10^3/uL


(1.0-4.8) 


 


 





 


Monocytes # (Auto)


 0.7 x10^3/uL


(0.0-1.1) 


 


 





 


Eosinophils # (Auto)


 0.3 x10^3/uL


(0.0-0.7) 


 


 





 


Basophils # (Auto)


 0.1 x10^3/uL


(0.0-0.2) 


 


 





 


Sodium Level


 134 mmol/L


(136-145) 


 


 





 


Potassium Level


 4.6 mmol/L


(3.5-5.1) 


 


 





 


Chloride Level


 102 mmol/L


() 


 


 





 


Carbon Dioxide Level


 30 mmol/L


(21-32) 


 


 





 


Anion Gap 2 (6-14)    


 


Blood Urea Nitrogen


 11 mg/dL


(7-20) 


 


 





 


Creatinine


 0.6 mg/dL


(0.6-1.0) 


 


 





 


Estimated GFR


(Cockcroft-Gault) 106.3 


 


 


 





 


BUN/Creatinine Ratio 18 (6-20)    


 


Glucose Level


 115 mg/dL


(70-99) 


 


 





 


Calcium Level


 11.4 mg/dL


(8.5-10.1) 


 


 





 


Total Bilirubin


 0.5 mg/dL


(0.2-1.0) 


 


 





 


Aspartate Amino Transf


(AST/SGOT) 15 U/L (15-37) 


 


 


 





 


Alanine Aminotransferase


(ALT/SGPT) 14 U/L (14-59) 


 


 


 





 


Alkaline Phosphatase


 119 U/L


() 


 


 





 


Creatine Kinase


 < 7 U/L


() 


 


 





 


Total Protein


 5.5 g/dL


(6.4-8.2) 


 


 





 


Albumin


 1.2 g/dL


(3.4-5.0) 


 


 





 


Albumin/Globulin Ratio 0.3 (1.0-1.7)    


 


Glucose (Fingerstick)


 


 113 mg/dL


(70-99) 


 











Laboratory Tests








Test


 7/27/20


12:46 7/27/20


17:29 7/27/20


23:53 7/28/20


05:45


 


Glucose (Fingerstick)


 141 mg/dL


(70-99) 135 mg/dL


(70-99) 106 mg/dL


(70-99) 





 


White Blood Count


 


 


 


 16.6 x10^3/uL


(4.0-11.0)


 


Red Blood Count


 


 


 


 2.90 x10^6/uL


(3.50-5.40)


 


Hemoglobin


 


 


 


 8.2 g/dL


(12.0-15.5)


 


Hematocrit


 


 


 


 24.4 %


(36.0-47.0)


 


Mean Corpuscular Volume    84 fL () 


 


Mean Corpuscular Hemoglobin    28 pg (25-35) 


 


Mean Corpuscular Hemoglobin


Concent 


 


 


 34 g/dL


(31-37)


 


Red Cell Distribution Width


 


 


 


 15.8 %


(11.5-14.5)


 


Platelet Count


 


 


 


 576 x10^3/uL


(140-400)


 


Neutrophils (%) (Auto)    84 % (31-73) 


 


Lymphocytes (%) (Auto)    10 % (24-48) 


 


Monocytes (%) (Auto)    4 % (0-9) 


 


Eosinophils (%) (Auto)    2 % (0-3) 


 


Basophils (%) (Auto)    1 % (0-3) 


 


Neutrophils # (Auto)


 


 


 


 13.9 x10^3/uL


(1.8-7.7)


 


Lymphocytes # (Auto)


 


 


 


 1.6 x10^3/uL


(1.0-4.8)


 


Monocytes # (Auto)


 


 


 


 0.7 x10^3/uL


(0.0-1.1)


 


Eosinophils # (Auto)


 


 


 


 0.3 x10^3/uL


(0.0-0.7)


 


Basophils # (Auto)


 


 


 


 0.1 x10^3/uL


(0.0-0.2)


 


Sodium Level


 


 


 


 134 mmol/L


(136-145)


 


Potassium Level


 


 


 


 4.6 mmol/L


(3.5-5.1)


 


Chloride Level


 


 


 


 102 mmol/L


()


 


Carbon Dioxide Level


 


 


 


 30 mmol/L


(21-32)


 


Anion Gap    2 (6-14) 


 


Blood Urea Nitrogen


 


 


 


 11 mg/dL


(7-20)


 


Creatinine


 


 


 


 0.6 mg/dL


(0.6-1.0)


 


Estimated GFR


(Cockcroft-Gault) 


 


 


 106.3 





 


BUN/Creatinine Ratio    18 (6-20) 


 


Glucose Level


 


 


 


 115 mg/dL


(70-99)


 


Calcium Level


 


 


 


 11.4 mg/dL


(8.5-10.1)


 


Total Bilirubin


 


 


 


 0.5 mg/dL


(0.2-1.0)


 


Aspartate Amino Transf


(AST/SGOT) 


 


 


 15 U/L (15-37) 





 


Alanine Aminotransferase


(ALT/SGPT) 


 


 


 14 U/L (14-59) 





 


Alkaline Phosphatase


 


 


 


 119 U/L


()


 


Creatine Kinase


 


 


 


 < 7 U/L


()


 


Total Protein


 


 


 


 5.5 g/dL


(6.4-8.2)


 


Albumin


 


 


 


 1.2 g/dL


(3.4-5.0)


 


Albumin/Globulin Ratio    0.3 (1.0-1.7) 


 


Test


 7/28/20


06:23 


 


 





 


Glucose (Fingerstick)


 113 mg/dL


(70-99) 


 


 











Microbiology


7/26/20 Gram Stain - Final, Resulted


          


7/26/20 Aerobic Culture - Preliminary, Resulted


          


7/26/20 Blood Culture - Preliminary, Resulted


          NO GROWTH AFTER 2 DAYS


7/21/20 Gram Stain - Final, Complete


          


7/21/20 Aerobic and Anaerobic Culture - Final, Complete


          


7/19/20 Gram Stain Evaluation - Final, Complete


          


7/19/20 Respiratory Culture - Final, Complete


          


7/19/20 Antimicrobic Susceptibility - Final, Complete


          


6/30/20 Gram Stain - Final, Complete


          


6/30/20 Aerobic and Anaerobic Culture - Final, Complete


          


6/30/20 Antimicrobic Susceptibility - Final, Complete


          


6/7/20 Urine Culture - Final, Complete


Vitals/I & O





Vital Sign - Last 24 Hours








 7/27/20 7/27/20 7/27/20 7/27/20





 12:09 12:09 15:00 16:00


 


Pulse   132 


 


Resp   41 


 


B/P (MAP)   146/87 (106) 


 


Pulse Ox 95 95 97 96


 


O2 Delivery Nasal Cannula Nasal Cannula Nasal Cannula Nasal Cannula


 


O2 Flow Rate 2.0 2.0 2.0 2.0





 7/27/20 7/27/20 7/27/20 7/27/20





 18:00 18:16 19:00 19:43


 


Temp   98.7 





   98.7 


 


Pulse 120  119 


 


Resp 30  25 


 


B/P (MAP) 117/76 (90)  130/73 (92) 


 


Pulse Ox 97 96 98 96


 


O2 Delivery Nasal Cannula Nasal Cannula Nasal Cannula Nasal Cannula


 


O2 Flow Rate 2.0 2.0 2.0 2.0


 


    





    





 7/27/20 7/27/20 7/28/20 7/28/20





 20:00 23:00 00:11 03:00


 


Temp  99.2  99.1





  99.2  99.1


 


Pulse  120  122


 


Resp  24  34


 


B/P (MAP)  115/81 (92)  121/72 (88)


 


Pulse Ox  97 98 95


 


O2 Delivery Nasal Cannula Nasal Cannula Nasal Cannula Nasal Cannula


 


O2 Flow Rate 2.0 2.0 2.0 2.0


 


    





    





 7/28/20 7/28/20 7/28/20 7/28/20





 04:36 07:00 08:00 08:15


 


Temp  99.2  





  99.2  


 


Pulse  120  


 


Resp  22  


 


B/P (MAP)  111/71 (84)  


 


Pulse Ox 96 97  96


 


O2 Delivery Nasal Cannula Nasal Cannula Nasal Cannula Nasal Cannula


 


O2 Flow Rate 2.0 2.0 2.0 2.0


 


    





    





 7/28/20   





 08:21   


 


Resp 31   


 


Pulse Ox 95   


 


O2 Delivery Nasal Cannula   


 


O2 Flow Rate 2.0   














Intake and Output   


 


 7/27/20 7/27/20 7/28/20





 15:00 23:00 07:00


 


Intake Total   2387 ml


 


Output Total 685 ml 940 ml 960 ml


 


Balance -685 ml -940 ml 1427 ml








Problem List


Problems


Medical Problems:


(1) Acute pancreatitis


Status: Acute  





(2) Cholelithiasis


Status: Acute  





Assessment


Necrotizing biliary pancreatitis with multiple complications and multiple 

interventions; remains critically ill.


Plan of Care Note


Continue support.





Justicifation of Admission Dx:


Justifications for Admission:


Justification of Admission Dx:  Yes











MARCO ANTONIO LONGO MD          Jul 28, 2020 12:04

## 2020-07-28 NOTE — NUR
Pt's left ROBERT drain was removed and an ostomy bag was placed over the site. Site was cleaned 
and prepped with skin prep. Will continue to monitor.

## 2020-07-28 NOTE — PDOC
Infectious Disease Note


Subjective


Subjective





States ok.  No N/SOAF/S/C. pain ok


On 2 L O2 by nasal cannula


TPN





ROS


ROS


o/w neg





Vital Sign


Vital Signs





Vital Signs








  Date Time  Temp Pulse Resp B/P (MAP) Pulse Ox O2 Delivery O2 Flow Rate FiO2


 


7/28/20 04:36     96 Nasal Cannula 2.0 


 


7/28/20 03:00 99.1 122 34 121/72 (88)    





 99.1       











Physical Exam


PHYSICAL EXAM


GENERAL: pt in bed, appears weak -comfortable -alert


HEENT: Pupils equal, oral cavity dry. OC/Op - Dry


NECK:  Tracheostomy - no JVD


LUNGS: Diminished aeration bases,  CT on left 


HEART:  S1, S2, 


ABDOMEN: Distended mild- bowel sounds hypoactive, soft, richadrson x 2, ROBERT drains, 

G-J tube


: [place] in place 


EXTREMITIES: Trace generalized edema, no cyanosis. MARTHA hose bilaterally, 


SKIN: warm touch. No signs of rash.  


LUE- PICC without signs of complications


NEURO: - Alert and responsive





Labs


Lab





Laboratory Tests








Test


 7/27/20


12:46 7/27/20


17:29 7/27/20


23:53 7/28/20


05:45


 


Glucose (Fingerstick)


 141 mg/dL


(70-99) 135 mg/dL


(70-99) 106 mg/dL


(70-99) 





 


White Blood Count


 


 


 


 16.6 x10^3/uL


(4.0-11.0)


 


Red Blood Count


 


 


 


 2.90 x10^6/uL


(3.50-5.40)


 


Hemoglobin


 


 


 


 8.2 g/dL


(12.0-15.5)


 


Hematocrit


 


 


 


 24.4 %


(36.0-47.0)


 


Mean Corpuscular Volume    84 fL () 


 


Mean Corpuscular Hemoglobin    28 pg (25-35) 


 


Mean Corpuscular Hemoglobin


Concent 


 


 


 34 g/dL


(31-37)


 


Red Cell Distribution Width


 


 


 


 15.8 %


(11.5-14.5)


 


Platelet Count


 


 


 


 576 x10^3/uL


(140-400)


 


Neutrophils (%) (Auto)    84 % (31-73) 


 


Lymphocytes (%) (Auto)    10 % (24-48) 


 


Monocytes (%) (Auto)    4 % (0-9) 


 


Eosinophils (%) (Auto)    2 % (0-3) 


 


Basophils (%) (Auto)    1 % (0-3) 


 


Neutrophils # (Auto)


 


 


 


 13.9 x10^3/uL


(1.8-7.7)


 


Lymphocytes # (Auto)


 


 


 


 1.6 x10^3/uL


(1.0-4.8)


 


Monocytes # (Auto)


 


 


 


 0.7 x10^3/uL


(0.0-1.1)


 


Eosinophils # (Auto)


 


 


 


 0.3 x10^3/uL


(0.0-0.7)


 


Basophils # (Auto)


 


 


 


 0.1 x10^3/uL


(0.0-0.2)


 


Sodium Level


 


 


 


 134 mmol/L


(136-145)


 


Potassium Level


 


 


 


 4.6 mmol/L


(3.5-5.1)


 


Chloride Level


 


 


 


 102 mmol/L


()


 


Carbon Dioxide Level


 


 


 


 30 mmol/L


(21-32)


 


Anion Gap    2 (6-14) 


 


Blood Urea Nitrogen


 


 


 


 11 mg/dL


(7-20)


 


Creatinine


 


 


 


 0.6 mg/dL


(0.6-1.0)


 


Estimated GFR


(Cockcroft-Gault) 


 


 


 106.3 





 


BUN/Creatinine Ratio    18 (6-20) 


 


Glucose Level


 


 


 


 115 mg/dL


(70-99)


 


Calcium Level


 


 


 


 11.4 mg/dL


(8.5-10.1)


 


Total Bilirubin


 


 


 


 0.5 mg/dL


(0.2-1.0)


 


Aspartate Amino Transf


(AST/SGOT) 


 


 


 15 U/L (15-37) 





 


Alanine Aminotransferase


(ALT/SGPT) 


 


 


 14 U/L (14-59) 





 


Alkaline Phosphatase


 


 


 


 119 U/L


()


 


Creatine Kinase


 


 


 


 < 7 U/L


()


 


Total Protein


 


 


 


 5.5 g/dL


(6.4-8.2)


 


Albumin


 


 


 


 1.2 g/dL


(3.4-5.0)


 


Albumin/Globulin Ratio    0.3 (1.0-1.7) 


 


Test


 7/28/20


06:23 


 


 





 


Glucose (Fingerstick)


 113 mg/dL


(70-99) 


 


 











Micro


6/7 





GRAM STAIN  Final  


        Final





        GRAM NEGATIVE RODS:MODERATE


        SQUAMOUS EPI CELL:NOT APPLICABLE


        PMN (WBCs):RARE


        YEAST:MODERATE


        Unless otherwise specified, Testing Performed by:


        [facility]


        [address] [place] [address]


        For Inquires, the Physician may contact the Microbiology


        department at [phone]





  ANAEROBIC-AEROBIC CULTURE  Preliminary  


        Preliminary





        MANY GRAM NEGATIVE RODS on 06/09/20 at 1158


        FINAL ID= [PSEUDOMONAS AERUGINOSA]


        PSEUDOMONAS AERUGINOSA





  ANTIMICROBIAL SUSCEPTIBILITY  Preliminary  


        Comment





        NEG ANSON 56


        PSEUDOMONAS AERUGINOSA


        ANTIBIOTIC                        RESULT          INTERPRETATION


        AMIKACIN                          <=16                  S


        AZTREONAM                         >16                   R


        CEFTAZIDIME                       >16                   R


        CIPROFLOXACIN                     <=0.25                S


        CEFEPIME                          16                    I


        CEFTAZIDIME/AVIBACTAM             <=4                   S


        GENTAMICIN                        <=2                   S














                               ** CONTINUED ON NEXT PAGE **





-------------------------------------------------------------------

-------------------------





RUN DATE: 06/11/20                  [facility] *LIVE*               

  PAGE 2   


RUN TIME: 1016                            Specimen Inquiry                    


---------------------------------------------------------

-----------------------------------





SPEC: [id number]    PATIENT: JESENIA BEAN                [id number]  

(Continued)


--

--------------------------------------------------------------------------------


----------








-----------------------------------

---------------------------------------------------------





  Procedure                         Result                                      

         


 

--------------------------------------------------------------------------------


------------





  ANTIMICROBIAL SUSCEPTIBILITY  Preliminary   (continued)


        LEVOFLOXACIN                      <=0.5                 S


        MEROPENEM                         <=1                   S


        PIPERACILLIN/TAZOBACTAM           64                    S


        TOBRAMYCIN                        <=2                   S


        Unless otherwise specified, Testing Performed by:


        [facility]


        [address] [place] [address]


        For Inquires, the Physician may contact the Microbiology


        department at [phone]











CT Scan 6/6





IMPRESSION:


1. Removal of the percutaneous pigtail drainage catheters since the prior 


exam. Sequela of pancreatitis with extensive pseudocysts again 


demonstrated, the right-sided collections are slightly larger since the 


prior exam, the left-sided collections are stable. See above.


2. Moderate to large left pleural effusion with atelectasis and collapse 


of most of the left lower lobe, stable. Small right pleural effusion is 


stable.


3. Gallstone.





Objective


Assessment





Patient with prolonged hospitalization more than 4 months


Multiple medical problems


Multiple surgical procedures





S/P Exp. 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 

parapsilosis & PSAE (I-merrem/ceftazidime/AZT/cefepime))


Leucocytosis - mild increase ? reactive as clinically looks well


Fever - 7/25 c-diff neg


Anemia


Acute gallstone pancreatitis with persistent necrosis


  - 4/9.  CT A/P Increased ascites. Persistent evidence of necrotizing 

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


  - 4/27. status post ROBERT drain placement; C. parapsilosis. s/p drain 5/6 + yeast

& high amylase; s/p additional drain on 5/8. Drains removed. 


  -5/6. fluid  candida parapsilosis fluid, amylase high


  - 6/6 showed multiple pseudocysts, slight larger on the right. s/p drains x 3,

6/7.  + PSAE (MDRO-R Cefepime, Zosyn ANSON < 64) and yeast, 


  -6/7 s/p drain replacement x 3; fluid cult PSAE (MDRO), yeast; treated


  -7/12 CT A/P shows smaller fluid collections.  


  -722 CT abdomen and pelvis drains in place       


Ascites s/p paracentesis 4/15 & 5/6. C. parapsilosis 


Cholelithiasis with thickening of the gallbladder wall.


JED, Hyperkalemia, Metabolic acidosis off dialysis


Acute hypoxic resp failure. trach/vent. sputum 6/13  + PSAE (I merrem) ; sputum 

culture July 19+ for PSAE R Merrem, sensitive to cefepime


Pleural effusion status post CTS left side


 Abdominal fluid culture 6 /7 MDRO Pseudomonas, yeast


Sputum culture positive 7/19 for MDRO Pseudomonas


Chest tube fluid positive for 7/21 Candida Parapsilosis





Plan


Plan of Care





Continue Avycaz 7/23 /micafungin 6/30


Restart daptomycin 7/26(CK <7 7/28)


Replace PICC if Blood cults neg 48 hours


CBC/CMP in am


UA and urine culture Blood culture/Cath tip neg - pending


DC PICC line 7/26


BC from 7 /21  neg to date 


C. difficile negative


Monitor WBC/temp 


Wound care /drain management as directed


Contact isolation for CRE/MDRO








Critically ill





Long term prognosis  poor





D/w nursing











RASHAWN ROSEN MD              Jul 28, 2020 08:12

## 2020-07-28 NOTE — NUR
Pt placed back into bed @ 1600-- Xray notified to do KUB-- informed this RN that it will be 
a couple of hours due to them having other tests to complete first.

## 2020-07-28 NOTE — PDOC
[employer] [facility] PROGRESS NOTE


Chief Complaint


Chief Complaint


S/P Exp. 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 

parapsilosis & PSAE (I-merrem/ceftazidime/AZT/cefepime))


Leucocytosis -trending upward 


Fever 


Acute gallstone pancreatitis with persistent necrosis


Postop (Exploratory laparotomy, lysis of adhesions, subtotal cholecystectomy 

with cholangiogram, gastrojejunostomy tube placement, pancreatic necrosectomy)


Acute hypoxic Respiratory failure required  mechanical ventilation


Tracheostomy


bilateral pleural effusions/pulm edema s/p Throacentesis on 6/15/2020


Severe Acute gallstone pancreatitis (not a surgical candidate at this time) with

necrosis


Acute kidney failure now requiring dialysis


Gallstones (Calculus of gallbladder with acute cholecystitis without obstru

ction)


HTN 


Intractable pain


Intractable nausea


Covid 19 negative. 


Acute on chronic anemia 


EEG: No seizure activity


Fever  - intermittent 


? Ileus with vomiting


Abd distention - U/S and CT reviewed s/p 0.4 L of opaque, debris-containing 

ascites was removed 5/6


Acute pancreatitis with persistent necrosis


Gallstone pancreatitis with necrosis. 


   -CT A/P 6/6 showed multiple pseudocysts, slight larger on the right. s/p 

drains x 3, 6/7.  + PSAE (MDRO-R Cefepime, Zosyn ANSON < 64) and yeast, 


   -s/p drain 4/27. C. parapsilosis. s/p drain 5/6 + yeast & high amylase; s/p 

additional drain on 5/8. Drains removed. 


Ascites s/p paracentesis 4/15 & 5/6. C. parapsilosis 


JED. off HD. 


A large fluid collection in the pancreatic bed has slightly decreased in size, 

described below, the pancreas itself is difficult to  visualize, which could be 

due to necrosis or obscuration of pancreatic  parenchyma from the surrounding 

fluid collection.6/15 


- 4/27 status post ROBERT drain placement + C paropsilosis. s/p additional drains 

5/8


Anemia - S/p PRBCs. 


Cholelithiasis with thickening of the gallbladder wall.


Leucocytosis improving


JED, hyperkalemia, Metabolic acidosis off dialysis


hypocalcemia 


Prediabetes


HTN


s/p trach


Hyperglycemia


severe protein-caloric malnutrition


Moderate to large left pleural effusion with atelectasis and collapse  of most 

of the left lower lobe, stable


Extensive retroperitoneal fluid collections persist. Percutaneous  drains remain

within the collections in both paracolic gutters. These  communicate with additi

onal pelvic and peripancreatic collections.











PLAN================











Continue Avycaz /micafungin, DC dapto per ID recommendations


Negative C diff


BC from 7/15 neg to date 


7/26 PICC line removed will start PPN for 24 hours then replace PICC line other 

side


Follow surgery input regarding diet and drains, pending GJ tube placement with 

IR before restarting tube feeds.


DVT GI prophylaxis


f/u BC /resp cultures


continue DVT/GI PPX





Discussed with RN

















40 MIN CC TIME





History of Present Illness


History of Present Illness


7/28/2020


Patient seen and examined bedside.  Patient appears to be in no obvious pain.  

All drains have been adequately draining.  Patient continues to be on TPN.  

Chart labs and imaging was reviewed.  Negative fluid balance of 270 cc


7/27/2020


Patient seen and examined in the ICU.  Patient still requires extensive care.  

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  Chart, labs, imaging was 

reviewed.  UOP with + 500cc balance.








7/25 /2020





Patient seen in and examined in the ICU


She is still extremely critically ill


Appears weak, frail, and pale


Better color today with improved eye contact and expression


Discussed with RN


Chart reviewed











 








7/21/2020


Patient seen and examined in the ICU


She is still extremely critically ill


Appears extremely weak frail and pale


Discussed with RN


Chart reviewed





Vitals/I&O


Vitals/I&O:





                                   Vital Signs








  Date Time  Temp Pulse Resp B/P (MAP) Pulse Ox O2 Delivery O2 Flow Rate FiO2


 


7/28/20 08:21   31  95 Nasal Cannula 2.0 


 


7/28/20 07:00 99.2 120  111/71 (84)    





 99.2       














                                    I & O   


 


 7/27/20 7/27/20 7/28/20





 15:00 23:00 07:00


 


Intake Total   2387 ml


 


Output Total 685 ml 940 ml 960 ml


 


Balance -685 ml -940 ml 1427 ml











Physical Exam


Physical Exam:


GENERAL: pt in bed, appears weak -comfortable -alert


HEENT: Pupils equal, oral cavity dry. OC/Op - Dry


NECK:  Tracheostomy - no JVD


LUNGS: Diminished aeration bases,  CT on left 


HEART:  S1, S2, 


ABDOMEN: Distended mild- bowel sounds hypoactive, soft, richardson x 2, ROBERT drains, 

G-J tube


: [place] in place 


EXTREMITIES: Trace generalized edema, no cyanosis. MARTHA hose bilaterally, 


SKIN: warm touch. No signs of rash.  


LUE- PICC without signs of complications


NEURO: - Alert and responsive


General:  Cooperative, No acute distress


Heart:  Other (distant heart sounds, tachycardic)


Lungs:  Crackles


Abdomen:  Soft (drains in place,  wound vac in place)


Extremities:  Other (Diffuse edema)


Skin:  No rashes, No significant lesion





Labs


Labs:





Laboratory Tests








Test


 7/27/20


12:46 7/27/20


17:29 7/27/20


23:53 7/28/20


05:45


 


Glucose (Fingerstick)


 141 mg/dL


(70-99) 135 mg/dL


(70-99) 106 mg/dL


(70-99) 





 


White Blood Count


 


 


 


 16.6 x10^3/uL


(4.0-11.0)


 


Red Blood Count


 


 


 


 2.90 x10^6/uL


(3.50-5.40)


 


Hemoglobin


 


 


 


 8.2 g/dL


(12.0-15.5)


 


Hematocrit


 


 


 


 24.4 %


(36.0-47.0)


 


Mean Corpuscular Volume    84 fL () 


 


Mean Corpuscular Hemoglobin    28 pg (25-35) 


 


Mean Corpuscular Hemoglobin


Concent 


 


 


 34 g/dL


(31-37)


 


Red Cell Distribution Width


 


 


 


 15.8 %


(11.5-14.5)


 


Platelet Count


 


 


 


 576 x10^3/uL


(140-400)


 


Neutrophils (%) (Auto)    84 % (31-73) 


 


Lymphocytes (%) (Auto)    10 % (24-48) 


 


Monocytes (%) (Auto)    4 % (0-9) 


 


Eosinophils (%) (Auto)    2 % (0-3) 


 


Basophils (%) (Auto)    1 % (0-3) 


 


Neutrophils # (Auto)


 


 


 


 13.9 x10^3/uL


(1.8-7.7)


 


Lymphocytes # (Auto)


 


 


 


 1.6 x10^3/uL


(1.0-4.8)


 


Monocytes # (Auto)


 


 


 


 0.7 x10^3/uL


(0.0-1.1)


 


Eosinophils # (Auto)


 


 


 


 0.3 x10^3/uL


(0.0-0.7)


 


Basophils # (Auto)


 


 


 


 0.1 x10^3/uL


(0.0-0.2)


 


Sodium Level


 


 


 


 134 mmol/L


(136-145)


 


Potassium Level


 


 


 


 4.6 mmol/L


(3.5-5.1)


 


Chloride Level


 


 


 


 102 mmol/L


()


 


Carbon Dioxide Level


 


 


 


 30 mmol/L


(21-32)


 


Anion Gap    2 (6-14) 


 


Blood Urea Nitrogen


 


 


 


 11 mg/dL


(7-20)


 


Creatinine


 


 


 


 0.6 mg/dL


(0.6-1.0)


 


Estimated GFR


(Cockcroft-Gault) 


 


 


 106.3 





 


BUN/Creatinine Ratio    18 (6-20) 


 


Glucose Level


 


 


 


 115 mg/dL


(70-99)


 


Calcium Level


 


 


 


 11.4 mg/dL


(8.5-10.1)


 


Total Bilirubin


 


 


 


 0.5 mg/dL


(0.2-1.0)


 


Aspartate Amino Transf


(AST/SGOT) 


 


 


 15 U/L (15-37) 





 


Alanine Aminotransferase


(ALT/SGPT) 


 


 


 14 U/L (14-59) 





 


Alkaline Phosphatase


 


 


 


 119 U/L


()


 


Creatine Kinase


 


 


 


 < 7 U/L


()


 


Total Protein


 


 


 


 5.5 g/dL


(6.4-8.2)


 


Albumin


 


 


 


 1.2 g/dL


(3.4-5.0)


 


Albumin/Globulin Ratio    0.3 (1.0-1.7) 


 


Test


 7/28/20


06:23 


 


 





 


Glucose (Fingerstick)


 113 mg/dL


(70-99) 


 


 














Review of Systems


Review of Systems:


CONSTITUIONAL: Denies weight loss, fever and chills.


HEENT: Denies changes in vision and hearing.


RESPIRATORY: Denies SOB and cough.


CV: Denies palpitations and CP. 


GI: Denies abdominal pain, nausea, vomiting and diarrhea.


: Denies dysuria and urinary frequency.


MSK: Denies myalgia and joint pain.


SKIN: Denies rash and pruritus.


NEUROLOGICAL: Denies headache and syncope.


PSYCHIATRIC: Denies recent changes in mood. Denies anxiety and depression.





Assessment and Plan


Assessmemt and Plan


Problems


Medical Problems:


(1) Acute pancreatitis


Status: Acute  





(2) Cholelithiasis


Status: Acute  











Comment


Review of Relevant


I have reviewed the following items josy (where applicable) has been applied.





Justicifation of Admission Dx:


Justifications for Admission:


Justification of Admission Dx:  Yes











JACINTO MIRANDA MD                    Jul 28, 2020 11:07

## 2020-07-28 NOTE — NUR
SS following up with discharge planning. SS reviewed pt chart and discussed with pt RN. Pt 
currently on two liters nasal canula. Pt on PPN, IV Daptomycin, IV Avycaz, and IV 
Micafungin. Per RN, attempting tube feeds today. Pt has ROBERT drains x2 and Avi drain x1. 
Pt wearing speaking valve. Pt participating with PT/OT and two person assist. Med Assist 
attempting to work with pt's family to complete social security disability application. Pt 
is currently self pay. SS will continue to follow for discharge planning.

## 2020-07-29 VITALS — DIASTOLIC BLOOD PRESSURE: 79 MMHG | SYSTOLIC BLOOD PRESSURE: 136 MMHG

## 2020-07-29 VITALS — DIASTOLIC BLOOD PRESSURE: 76 MMHG | SYSTOLIC BLOOD PRESSURE: 119 MMHG

## 2020-07-29 VITALS — SYSTOLIC BLOOD PRESSURE: 136 MMHG | DIASTOLIC BLOOD PRESSURE: 79 MMHG

## 2020-07-29 VITALS — SYSTOLIC BLOOD PRESSURE: 131 MMHG | DIASTOLIC BLOOD PRESSURE: 68 MMHG

## 2020-07-29 VITALS — DIASTOLIC BLOOD PRESSURE: 87 MMHG | SYSTOLIC BLOOD PRESSURE: 145 MMHG

## 2020-07-29 VITALS — SYSTOLIC BLOOD PRESSURE: 167 MMHG | DIASTOLIC BLOOD PRESSURE: 90 MMHG

## 2020-07-29 VITALS — SYSTOLIC BLOOD PRESSURE: 161 MMHG | DIASTOLIC BLOOD PRESSURE: 73 MMHG

## 2020-07-29 LAB
ANION GAP SERPL CALC-SCNC: 7 MMOL/L (ref 6–14)
BASOPHILS # BLD AUTO: 0 X10^3/UL (ref 0–0.2)
BASOPHILS NFR BLD: 0 % (ref 0–3)
BUN SERPL-MCNC: 10 MG/DL (ref 7–20)
CALCIUM SERPL-MCNC: 11.1 MG/DL (ref 8.5–10.1)
CHLORIDE SERPL-SCNC: 101 MMOL/L (ref 98–107)
CO2 SERPL-SCNC: 26 MMOL/L (ref 21–32)
CREAT SERPL-MCNC: 0.6 MG/DL (ref 0.6–1)
EOSINOPHIL NFR BLD: 0.2 X10^3/UL (ref 0–0.7)
EOSINOPHIL NFR BLD: 2 % (ref 0–3)
ERYTHROCYTE [DISTWIDTH] IN BLOOD BY AUTOMATED COUNT: 16 % (ref 11.5–14.5)
GFR SERPLBLD BASED ON 1.73 SQ M-ARVRAT: 106.3 ML/MIN
GLUCOSE SERPL-MCNC: 131 MG/DL (ref 70–99)
HCT VFR BLD CALC: 23.5 % (ref 36–47)
HGB BLD-MCNC: 7.5 G/DL (ref 12–15.5)
LYMPHOCYTES # BLD: 1.1 X10^3/UL (ref 1–4.8)
LYMPHOCYTES NFR BLD AUTO: 9 % (ref 24–48)
MCH RBC QN AUTO: 27 PG (ref 25–35)
MCHC RBC AUTO-ENTMCNC: 32 G/DL (ref 31–37)
MCV RBC AUTO: 84 FL (ref 79–100)
MONO #: 0.8 X10^3/UL (ref 0–1.1)
MONOCYTES NFR BLD: 6 % (ref 0–9)
NEUT #: 10.3 X10^3/UL (ref 1.8–7.7)
NEUTROPHILS NFR BLD AUTO: 83 % (ref 31–73)
PLATELET # BLD AUTO: 626 X10^3/UL (ref 140–400)
POTASSIUM SERPL-SCNC: 3.9 MMOL/L (ref 3.5–5.1)
RBC # BLD AUTO: 2.78 X10^6/UL (ref 3.5–5.4)
SODIUM SERPL-SCNC: 134 MMOL/L (ref 136–145)
WBC # BLD AUTO: 12.4 X10^3/UL (ref 4–11)

## 2020-07-29 RX ADMIN — CEFTAZIDIME, AVIBACTAM SCH MLS/HR: 2; .5 POWDER, FOR SOLUTION INTRAVENOUS at 05:36

## 2020-07-29 RX ADMIN — CEFTAZIDIME, AVIBACTAM SCH MLS/HR: 2; .5 POWDER, FOR SOLUTION INTRAVENOUS at 13:57

## 2020-07-29 RX ADMIN — PANTOPRAZOLE SODIUM SCH MG: 40 INJECTION, POWDER, FOR SOLUTION INTRAVENOUS at 08:34

## 2020-07-29 RX ADMIN — INSULIN LISPRO SCH UNITS: 100 INJECTION, SOLUTION INTRAVENOUS; SUBCUTANEOUS at 18:00

## 2020-07-29 RX ADMIN — IPRATROPIUM BROMIDE AND ALBUTEROL SULFATE SCH ML: .5; 3 SOLUTION RESPIRATORY (INHALATION) at 15:52

## 2020-07-29 RX ADMIN — IPRATROPIUM BROMIDE AND ALBUTEROL SULFATE SCH ML: .5; 3 SOLUTION RESPIRATORY (INHALATION) at 02:55

## 2020-07-29 RX ADMIN — FENTANYL CITRATE PRN MCG: 50 INJECTION INTRAMUSCULAR; INTRAVENOUS at 22:44

## 2020-07-29 RX ADMIN — ONDANSETRON PRN MG: 2 INJECTION INTRAMUSCULAR; INTRAVENOUS at 19:00

## 2020-07-29 RX ADMIN — IPRATROPIUM BROMIDE AND ALBUTEROL SULFATE SCH ML: .5; 3 SOLUTION RESPIRATORY (INHALATION) at 23:31

## 2020-07-29 RX ADMIN — ENOXAPARIN SODIUM SCH MG: 40 INJECTION SUBCUTANEOUS at 08:35

## 2020-07-29 RX ADMIN — DAPTOMYCIN SCH MLS/HR: 500 INJECTION, POWDER, LYOPHILIZED, FOR SOLUTION INTRAVENOUS at 09:36

## 2020-07-29 RX ADMIN — INSULIN LISPRO SCH UNITS: 100 INJECTION, SOLUTION INTRAVENOUS; SUBCUTANEOUS at 12:40

## 2020-07-29 RX ADMIN — ACETYLCYSTEINE SCH MG: 200 INHALANT RESPIRATORY (INHALATION) at 08:00

## 2020-07-29 RX ADMIN — BACITRACIN SCH MLS/HR: 5000 INJECTION, POWDER, FOR SOLUTION INTRAMUSCULAR at 14:03

## 2020-07-29 RX ADMIN — INSULIN LISPRO SCH UNITS: 100 INJECTION, SOLUTION INTRAVENOUS; SUBCUTANEOUS at 05:40

## 2020-07-29 RX ADMIN — CEFTAZIDIME, AVIBACTAM SCH MLS/HR: 2; .5 POWDER, FOR SOLUTION INTRAVENOUS at 22:54

## 2020-07-29 RX ADMIN — ONDANSETRON PRN MG: 2 INJECTION INTRAMUSCULAR; INTRAVENOUS at 22:53

## 2020-07-29 RX ADMIN — GLYCERIN, ISOLEUCINE, LEUCINE, LYSINE, METHIONINE, PHENYLALANINE, THREONINE, TRYPTOPHAN, VALINE, ALANINE, GLYCINE, ARGININE, HISTIDINE, PROLINE, SERINE, CYSTEINE, SODIUM ACETATE, MAGNESIUM ACETATE, CALCIUM ACETATE, SODIUM CHLORIDE, POTASSIUM CHLORIDE, PHOSPHORIC ACID, AND POTASSIUM METABISULFITE SCH MLS/HR
3; .21; .27; .22; .16; .17; .12; .046; .2; .21; .42; .29; .085; .34; .18; .014; .2; .054; .026; .12; .15; .041 INJECTION INTRAVENOUS at 09:36

## 2020-07-29 RX ADMIN — IPRATROPIUM BROMIDE AND ALBUTEROL SULFATE SCH ML: .5; 3 SOLUTION RESPIRATORY (INHALATION) at 08:27

## 2020-07-29 RX ADMIN — IPRATROPIUM BROMIDE AND ALBUTEROL SULFATE SCH ML: .5; 3 SOLUTION RESPIRATORY (INHALATION) at 13:00

## 2020-07-29 RX ADMIN — FENTANYL CITRATE PRN MCG: 50 INJECTION INTRAMUSCULAR; INTRAVENOUS at 11:36

## 2020-07-29 RX ADMIN — ONDANSETRON PRN MG: 2 INJECTION INTRAMUSCULAR; INTRAVENOUS at 03:10

## 2020-07-29 RX ADMIN — IPRATROPIUM BROMIDE AND ALBUTEROL SULFATE SCH ML: .5; 3 SOLUTION RESPIRATORY (INHALATION) at 11:55

## 2020-07-29 RX ADMIN — INSULIN LISPRO SCH UNITS: 100 INJECTION, SOLUTION INTRAVENOUS; SUBCUTANEOUS at 00:00

## 2020-07-29 RX ADMIN — GLYCERIN, ISOLEUCINE, LEUCINE, LYSINE, METHIONINE, PHENYLALANINE, THREONINE, TRYPTOPHAN, VALINE, ALANINE, GLYCINE, ARGININE, HISTIDINE, PROLINE, SERINE, CYSTEINE, SODIUM ACETATE, MAGNESIUM ACETATE, CALCIUM ACETATE, SODIUM CHLORIDE, POTASSIUM CHLORIDE, PHOSPHORIC ACID, AND POTASSIUM METABISULFITE SCH MLS/HR
3; .21; .27; .22; .16; .17; .12; .046; .2; .21; .42; .29; .085; .34; .18; .014; .2; .054; .026; .12; .15; .041 INJECTION INTRAVENOUS at 00:45

## 2020-07-29 RX ADMIN — ACETYLCYSTEINE SCH MG: 200 INHALANT RESPIRATORY (INHALATION) at 19:56

## 2020-07-29 RX ADMIN — DEXTROSE SCH MLS/HR: 50 INJECTION, SOLUTION INTRAVENOUS at 08:34

## 2020-07-29 RX ADMIN — IPRATROPIUM BROMIDE AND ALBUTEROL SULFATE SCH ML: .5; 3 SOLUTION RESPIRATORY (INHALATION) at 19:56

## 2020-07-29 NOTE — NUR
Last visit - 1/12/17  Labs - UA 8/22/16, CBC 8/9/16, CMP 4/29/16, TSH/Lipid 1/14/16   F/U - 4/14/2017    cyclobenzaprine (FLEXERIL) 10 MG tablet     1 tab po hs prn spasm    - last filled - 1/12/17 - 14# 0 refills       Printed for Dr. Miguel's review      Procedure:  Following complete explanation of the PICC procedure including the indications, 
risks, and potential complications, informed consent was obtained.

The possibility for infection was discussed along with signs, symptoms, and prevention. All 
the questions were answered.



Written and verbal patient education was provided. 



Hand hygiene performed. 



Standardized central line checklist was utilized. 



The patient was placed in the supine position, the arm was prepped with chlorhexidine and 
patient draped with maximum sterile barrier. 4 mL 1% lidocaine was infiltrated into the skin 
to provide local anesthesia.  A thorough assessment of Right upper extremity completed.  
Using real-time ultrasound guidance and standardized micro puncture set, the Basilic vein 
was punctured and a peel away sheath was placed using the modified Seldinger technique. 



Complications: Attempted placement three times, able to access vein without difficulty each 
time but unable to advance guide wire the first two attempts then with the third was able to 
insert guide wire without difficulties but unable to advance catheter. 20guage angiocath 
inserted RAC for IV access tonight, patient to have IR place PICC in am.


-------------------------------------------------------------------------------

Addendum: 07/30/20 at 0214 by SHIRA NOLAN RN

-------------------------------------------------------------------------------

Amended: Links added.

## 2020-07-29 NOTE — NUR
Wound Care: 





Incisional vac changed. Periwound draped, contact layer placed over incision line with 
silver foam at -125 mmHg continuous suction and a good seal maintained. All drain dressings 
removed, cleaned with chloroprep and new split gauze sponges reapplied and taped, pt 
tolerated dressing changes well. RLQ has a small  area of skin erosion  from a previous 
drain site, wound cleaned, and redressed with Aquacel AG and gauze. WC will continue to 
follow for vac management. Patient in chair at this time with call light light in reach.

## 2020-07-29 NOTE — PDOC
[facility] PROGRESS NOTE


Chief Complaint


Chief Complaint


S/P Exp. 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 

parapsilosis & PSAE (I-merrem/ceftazidime/AZT/cefepime))


Leucocytosis -trending upward 


Fever 


Acute gallstone pancreatitis with persistent necrosis


Postop (Exploratory laparotomy, lysis of adhesions, subtotal cholecystectomy 

with cholangiogram, gastrojejunostomy tube placement, pancreatic necrosectomy)


Acute hypoxic Respiratory failure required  mechanical ventilation


Tracheostomy


bilateral pleural effusions/pulm edema s/p Throacentesis on 6/15/2020


Severe Acute gallstone pancreatitis (not a surgical candidate at this time) with

necrosis


Acute kidney failure now requiring dialysis


Gallstones (Calculus of gallbladder with acute cholecystitis without obstru

ction)


HTN 


Intractable pain


Intractable nausea


Covid 19 negative. 


Acute on chronic anemia 


EEG: No seizure activity


Fever  - intermittent 


? Ileus with vomiting


Abd distention - U/S and CT reviewed s/p 0.4 L of opaque, debris-containing 

ascites was removed 5/6


Acute pancreatitis with persistent necrosis


Gallstone pancreatitis with necrosis. 


   -CT A/P 6/6 showed multiple pseudocysts, slight larger on the right. s/p 

drains x 3, 6/7.  + PSAE (MDRO-R Cefepime, Zosyn ANSON < 64) and yeast, 


   -s/p drain 4/27. C. parapsilosis. s/p drain 5/6 + yeast & high amylase; s/p 

additional drain on 5/8. Drains removed. 


Ascites s/p paracentesis 4/15 & 5/6. C. parapsilosis 


JED. off HD. 


A large fluid collection in the pancreatic bed has slightly decreased in size, 

described below, the pancreas itself is difficult to  visualize, which could be 

due to necrosis or obscuration of pancreatic  parenchyma from the surrounding 

fluid collection.6/15 


- 4/27 status post ROBERT drain placement + C paropsilosis. s/p additional drains 

5/8


Anemia - S/p PRBCs. 


Cholelithiasis with thickening of the gallbladder wall.


Leucocytosis improving


JED, hyperkalemia, Metabolic acidosis off dialysis


hypocalcemia 


Prediabetes


HTN


s/p trach


Hyperglycemia


severe protein-caloric malnutrition


Moderate to large left pleural effusion with atelectasis and collapse  of most 

of the left lower lobe, stable


Extensive retroperitoneal fluid collections persist. Percutaneous  drains remain

within the collections in both paracolic gutters. These  communicate with additi

onal pelvic and peripancreatic collections.











PLAN================











Continue Avycaz /micafungin, DC dapto per ID recommendations


Negative C diff


BC from 7/15 neg to date 


7/26 PICC line removed will start PPN for 24 hours then replace PICC line other 

side


Follow surgery input regarding diet and drains, pending GJ tube placement with 

IR before restarting tube feeds.


DVT GI prophylaxis


f/u BC /resp cultures


continue DVT/GI PPX





Discussed with RN

















35 MIN CC TIME





History of Present Illness


History of Present Illness


7/28/2020


Patient seen and examined bedside.  Patient appears to be in no obvious pain.  

All drains have been adequately draining.  Patient continues to be on TPN.  

Chart labs and imaging was reviewed.  Negative fluid balance of 270 cc


7/27/2020


Patient seen and examined in the ICU.  Patient still requires extensive care.  

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  Chart, labs, imaging was 

reviewed.  UOP with + 500cc balance.








7/25 /2020





Patient seen in and examined in the ICU


She is still extremely critically ill


Appears weak, frail, and pale


Better color today with improved eye contact and expression


Discussed with RN


Chart reviewed











 








7/21/2020


Patient seen and examined in the ICU


She is still extremely critically ill


Appears extremely weak frail and pale


Discussed with RN


Chart reviewed





Vitals/I&O


Vitals/I&O:





                                   Vital Signs








  Date Time  Temp Pulse Resp B/P (MAP) Pulse Ox O2 Delivery O2 Flow Rate FiO2


 


7/29/20 13:00     96 Nasal Cannula 2.0 


 


7/29/20 11:07 99.5 128 30 136/79 (98)    





 99.5       














                                    I & O   


 


 7/28/20 7/28/20 7/29/20





 15:00 23:00 07:00


 


Intake Total 150 ml 1001 ml 1928 ml


 


Output Total 665 ml 765 ml 900 ml


 


Balance -515 ml 236 ml 1028 ml











Physical Exam


Physical Exam:


GENERAL: pt in bed, appears weak -comfortable -alert


HEENT: Pupils equal, oral cavity dry. OC/Op - Dry


NECK:  Tracheostomy - no JVD


LUNGS: Diminished aeration bases,  CT on left 


HEART:  S1, S2, 


ABDOMEN: Distended mild- bowel sounds hypoactive, soft, richardson x 2, ROBERT drains, 

G-J tube


: [place] in place 


EXTREMITIES: Trace generalized edema, no cyanosis. MARTHA hose bilaterally, 


SKIN: warm touch. No signs of rash.  


LUE- Previous PICC site without signs of complications. PIV s clean


NEURO: - Alert and responsive


General:  Cooperative, No acute distress


Heart:  Other (distant heart sounds, tachycardic)


Lungs:  Crackles


Abdomen:  Soft (drains in place,  wound vac in place)


Extremities:  Other (Diffuse edema)


Skin:  No rashes, No significant lesion





Labs


Labs:





Laboratory Tests








Test


 7/28/20


17:42 7/28/20


23:41 7/29/20


05:00 7/29/20


05:40


 


Glucose (Fingerstick)


 120 mg/dL


(70-99) 122 mg/dL


(70-99) 


 126 mg/dL


(70-99)


 


White Blood Count


 


 


 12.4 x10^3/uL


(4.0-11.0) 





 


Red Blood Count


 


 


 2.78 x10^6/uL


(3.50-5.40) 





 


Hemoglobin


 


 


 7.5 g/dL


(12.0-15.5) 





 


Hematocrit


 


 


 23.5 %


(36.0-47.0) 





 


Mean Corpuscular Volume   84 fL ()  


 


Mean Corpuscular Hemoglobin   27 pg (25-35)  


 


Mean Corpuscular Hemoglobin


Concent 


 


 32 g/dL


(31-37) 





 


Red Cell Distribution Width


 


 


 16.0 %


(11.5-14.5) 





 


Platelet Count


 


 


 626 x10^3/uL


(140-400) 





 


Neutrophils (%) (Auto)   83 % (31-73)  


 


Lymphocytes (%) (Auto)   9 % (24-48)  


 


Monocytes (%) (Auto)   6 % (0-9)  


 


Eosinophils (%) (Auto)   2 % (0-3)  


 


Basophils (%) (Auto)   0 % (0-3)  


 


Neutrophils # (Auto)


 


 


 10.3 x10^3/uL


(1.8-7.7) 





 


Lymphocytes # (Auto)


 


 


 1.1 x10^3/uL


(1.0-4.8) 





 


Monocytes # (Auto)


 


 


 0.8 x10^3/uL


(0.0-1.1) 





 


Eosinophils # (Auto)


 


 


 0.2 x10^3/uL


(0.0-0.7) 





 


Basophils # (Auto)


 


 


 0.0 x10^3/uL


(0.0-0.2) 





 


Sodium Level


 


 


 134 mmol/L


(136-145) 





 


Potassium Level


 


 


 3.9 mmol/L


(3.5-5.1) 





 


Chloride Level


 


 


 101 mmol/L


() 





 


Carbon Dioxide Level


 


 


 26 mmol/L


(21-32) 





 


Anion Gap   7 (6-14)  


 


Blood Urea Nitrogen


 


 


 10 mg/dL


(7-20) 





 


Creatinine


 


 


 0.6 mg/dL


(0.6-1.0) 





 


Estimated GFR


(Cockcroft-Gault) 


 


 106.3 


 





 


Glucose Level


 


 


 131 mg/dL


(70-99) 





 


Calcium Level


 


 


 11.1 mg/dL


(8.5-10.1) 





 


Test


 7/29/20


12:35 


 


 





 


Glucose (Fingerstick)


 152 mg/dL


(70-99) 


 


 














Assessment and Plan


Assessmemt and Plan


Problems


Medical Problems:


(1) Acute pancreatitis


Status: Acute  





(2) Cholelithiasis


Status: Acute  











Comment


Review of Relevant


I have reviewed the following items josy (where applicable) has been applied.


Medications:





Current Medications








 Medications


  (Trade)  Dose


 Ordered  Sig/Yee


 Route


 PRN Reason  Start Time


 Stop Time Status Last Admin


Dose Admin


 


 Sodium Chloride  500 ml @ 


 500 mls/hr  1X  ONCE


 IV


   7/28/20 18:30


 7/28/20 19:29 DC 7/28/20 18:30














Justicifation of Admission Dx:


Justifications for Admission:


Justification of Admission Dx:  Yes











JACINTO MIRANDA MD                    Jul 29, 2020 15:09

## 2020-07-29 NOTE — PDOC
G I PROGRESS NOTE


Subjective


In bed.  Doing therapy.


Objective


Discussed with staff.  Issues with left ROBERT, removed.


Physical Exam


Stomal appliance over left ROBERT site.


[place] drains, right ROBERT remain.


Review of Relevant


I have reviewed the following items josy (where applicable) has been applied.


Labs





Laboratory Tests








Test


 7/27/20


12:46 7/27/20


17:29 7/27/20


23:53 7/28/20


05:45


 


Glucose (Fingerstick)


 141 mg/dL


(70-99) 135 mg/dL


(70-99) 106 mg/dL


(70-99) 





 


White Blood Count


 


 


 


 16.6 x10^3/uL


(4.0-11.0)


 


Red Blood Count


 


 


 


 2.90 x10^6/uL


(3.50-5.40)


 


Hemoglobin


 


 


 


 8.2 g/dL


(12.0-15.5)


 


Hematocrit


 


 


 


 24.4 %


(36.0-47.0)


 


Mean Corpuscular Volume    84 fL () 


 


Mean Corpuscular Hemoglobin    28 pg (25-35) 


 


Mean Corpuscular Hemoglobin


Concent 


 


 


 34 g/dL


(31-37)


 


Red Cell Distribution Width


 


 


 


 15.8 %


(11.5-14.5)


 


Platelet Count


 


 


 


 576 x10^3/uL


(140-400)


 


Neutrophils (%) (Auto)    84 % (31-73) 


 


Lymphocytes (%) (Auto)    10 % (24-48) 


 


Monocytes (%) (Auto)    4 % (0-9) 


 


Eosinophils (%) (Auto)    2 % (0-3) 


 


Basophils (%) (Auto)    1 % (0-3) 


 


Neutrophils # (Auto)


 


 


 


 13.9 x10^3/uL


(1.8-7.7)


 


Lymphocytes # (Auto)


 


 


 


 1.6 x10^3/uL


(1.0-4.8)


 


Monocytes # (Auto)


 


 


 


 0.7 x10^3/uL


(0.0-1.1)


 


Eosinophils # (Auto)


 


 


 


 0.3 x10^3/uL


(0.0-0.7)


 


Basophils # (Auto)


 


 


 


 0.1 x10^3/uL


(0.0-0.2)


 


Sodium Level


 


 


 


 134 mmol/L


(136-145)


 


Potassium Level


 


 


 


 4.6 mmol/L


(3.5-5.1)


 


Chloride Level


 


 


 


 102 mmol/L


()


 


Carbon Dioxide Level


 


 


 


 30 mmol/L


(21-32)


 


Anion Gap    2 (6-14) 


 


Blood Urea Nitrogen


 


 


 


 11 mg/dL


(7-20)


 


Creatinine


 


 


 


 0.6 mg/dL


(0.6-1.0)


 


Estimated GFR


(Cockcroft-Gault) 


 


 


 106.3 





 


BUN/Creatinine Ratio    18 (6-20) 


 


Glucose Level


 


 


 


 115 mg/dL


(70-99)


 


Calcium Level


 


 


 


 11.4 mg/dL


(8.5-10.1)


 


Total Bilirubin


 


 


 


 0.5 mg/dL


(0.2-1.0)


 


Aspartate Amino Transf


(AST/SGOT) 


 


 


 15 U/L (15-37) 





 


Alanine Aminotransferase


(ALT/SGPT) 


 


 


 14 U/L (14-59) 





 


Alkaline Phosphatase


 


 


 


 119 U/L


()


 


Creatine Kinase


 


 


 


 < 7 U/L


()


 


Total Protein


 


 


 


 5.5 g/dL


(6.4-8.2)


 


Albumin


 


 


 


 1.2 g/dL


(3.4-5.0)


 


Albumin/Globulin Ratio    0.3 (1.0-1.7) 


 


Test


 7/28/20


06:23 7/28/20


13:04 7/28/20


17:42 7/28/20


23:41


 


Glucose (Fingerstick)


 113 mg/dL


(70-99) 131 mg/dL


(70-99) 120 mg/dL


(70-99) 122 mg/dL


(70-99)


 


Test


 7/29/20


05:00 7/29/20


05:40 


 





 


White Blood Count


 12.4 x10^3/uL


(4.0-11.0) 


 


 





 


Red Blood Count


 2.78 x10^6/uL


(3.50-5.40) 


 


 





 


Hemoglobin


 7.5 g/dL


(12.0-15.5) 


 


 





 


Hematocrit


 23.5 %


(36.0-47.0) 


 


 





 


Mean Corpuscular Volume 84 fL ()    


 


Mean Corpuscular Hemoglobin 27 pg (25-35)    


 


Mean Corpuscular Hemoglobin


Concent 32 g/dL


(31-37) 


 


 





 


Red Cell Distribution Width


 16.0 %


(11.5-14.5) 


 


 





 


Platelet Count


 626 x10^3/uL


(140-400) 


 


 





 


Neutrophils (%) (Auto) 83 % (31-73)    


 


Lymphocytes (%) (Auto) 9 % (24-48)    


 


Monocytes (%) (Auto) 6 % (0-9)    


 


Eosinophils (%) (Auto) 2 % (0-3)    


 


Basophils (%) (Auto) 0 % (0-3)    


 


Neutrophils # (Auto)


 10.3 x10^3/uL


(1.8-7.7) 


 


 





 


Lymphocytes # (Auto)


 1.1 x10^3/uL


(1.0-4.8) 


 


 





 


Monocytes # (Auto)


 0.8 x10^3/uL


(0.0-1.1) 


 


 





 


Eosinophils # (Auto)


 0.2 x10^3/uL


(0.0-0.7) 


 


 





 


Basophils # (Auto)


 0.0 x10^3/uL


(0.0-0.2) 


 


 





 


Sodium Level


 134 mmol/L


(136-145) 


 


 





 


Potassium Level


 3.9 mmol/L


(3.5-5.1) 


 


 





 


Chloride Level


 101 mmol/L


() 


 


 





 


Carbon Dioxide Level


 26 mmol/L


(21-32) 


 


 





 


Anion Gap 7 (6-14)    


 


Blood Urea Nitrogen


 10 mg/dL


(7-20) 


 


 





 


Creatinine


 0.6 mg/dL


(0.6-1.0) 


 


 





 


Estimated GFR


(Cockcroft-Gault) 106.3 


 


 


 





 


Glucose Level


 131 mg/dL


(70-99) 


 


 





 


Calcium Level


 11.1 mg/dL


(8.5-10.1) 


 


 





 


Glucose (Fingerstick)


 


 126 mg/dL


(70-99) 


 











Laboratory Tests








Test


 7/28/20


13:04 7/28/20


17:42 7/28/20


23:41 7/29/20


05:00


 


Glucose (Fingerstick)


 131 mg/dL


(70-99) 120 mg/dL


(70-99) 122 mg/dL


(70-99) 





 


White Blood Count


 


 


 


 12.4 x10^3/uL


(4.0-11.0)


 


Red Blood Count


 


 


 


 2.78 x10^6/uL


(3.50-5.40)


 


Hemoglobin


 


 


 


 7.5 g/dL


(12.0-15.5)


 


Hematocrit


 


 


 


 23.5 %


(36.0-47.0)


 


Mean Corpuscular Volume    84 fL () 


 


Mean Corpuscular Hemoglobin    27 pg (25-35) 


 


Mean Corpuscular Hemoglobin


Concent 


 


 


 32 g/dL


(31-37)


 


Red Cell Distribution Width


 


 


 


 16.0 %


(11.5-14.5)


 


Platelet Count


 


 


 


 626 x10^3/uL


(140-400)


 


Neutrophils (%) (Auto)    83 % (31-73) 


 


Lymphocytes (%) (Auto)    9 % (24-48) 


 


Monocytes (%) (Auto)    6 % (0-9) 


 


Eosinophils (%) (Auto)    2 % (0-3) 


 


Basophils (%) (Auto)    0 % (0-3) 


 


Neutrophils # (Auto)


 


 


 


 10.3 x10^3/uL


(1.8-7.7)


 


Lymphocytes # (Auto)


 


 


 


 1.1 x10^3/uL


(1.0-4.8)


 


Monocytes # (Auto)


 


 


 


 0.8 x10^3/uL


(0.0-1.1)


 


Eosinophils # (Auto)


 


 


 


 0.2 x10^3/uL


(0.0-0.7)


 


Basophils # (Auto)


 


 


 


 0.0 x10^3/uL


(0.0-0.2)


 


Sodium Level


 


 


 


 134 mmol/L


(136-145)


 


Potassium Level


 


 


 


 3.9 mmol/L


(3.5-5.1)


 


Chloride Level


 


 


 


 101 mmol/L


()


 


Carbon Dioxide Level


 


 


 


 26 mmol/L


(21-32)


 


Anion Gap    7 (6-14) 


 


Blood Urea Nitrogen


 


 


 


 10 mg/dL


(7-20)


 


Creatinine


 


 


 


 0.6 mg/dL


(0.6-1.0)


 


Estimated GFR


(Cockcroft-Gault) 


 


 


 106.3 





 


Glucose Level


 


 


 


 131 mg/dL


(70-99)


 


Calcium Level


 


 


 


 11.1 mg/dL


(8.5-10.1)


 


Test


 7/29/20


05:40 


 


 





 


Glucose (Fingerstick)


 126 mg/dL


(70-99) 


 


 











Microbiology


7/26/20 Gram Stain - Final, Resulted


          


7/26/20 Aerobic Culture - Preliminary, Resulted


          


7/26/20 Blood Culture - Preliminary, Resulted


          NO GROWTH AFTER 3 DAYS


7/21/20 Gram Stain - Final, Complete


          


7/21/20 Aerobic and Anaerobic Culture - Final, Complete


          


7/19/20 Gram Stain Evaluation - Final, Complete


          


7/19/20 Respiratory Culture - Final, Complete


          


7/19/20 Antimicrobic Susceptibility - Final, Complete


          


6/30/20 Gram Stain - Final, Complete


          


6/30/20 Aerobic and Anaerobic Culture - Final, Complete


          


6/30/20 Antimicrobic Susceptibility - Final, Complete


          


6/7/20 Urine Culture - Final, Complete


Vitals/I & O





Vital Sign - Last 24 Hours








 7/28/20 7/28/20 7/28/20 7/28/20





 12:00 12:21 13:01 15:15


 


Temp 98.5   





 98.5   


 


Pulse 133   


 


Resp 31   


 


B/P (MAP) 127/73 (91)   


 


Pulse Ox 96 97 96 96


 


O2 Delivery Nasal Cannula Nasal Cannula Nasal Cannula Nasal Cannula


 


O2 Flow Rate 2.0 2.0 2.0 2.0


 


    





    





 7/28/20 7/28/20 7/28/20 7/28/20





 16:00 19:00 19:48 20:00


 


Temp 98.2 99.0  





 98.2 99.0  


 


Pulse 126 119  


 


Resp 31 33  


 


B/P (MAP) 137/85 (102) 142/81 (101)  


 


Pulse Ox 97 92 94 


 


O2 Delivery Nasal Cannula Nasal Cannula Nasal Cannula Nasal Cannula


 


O2 Flow Rate 2.0 2.0 2.0 2.0


 


    





    





 7/28/20 7/28/20 7/29/20 7/29/20





 23:00 23:35 03:00 03:00


 


Temp 98.7   99.0





 98.7   99.0


 


Pulse 121   126


 


Resp 27   32


 


B/P (MAP) 121/72 (88)   119/76 (90)


 


Pulse Ox 100 94 95 99


 


O2 Delivery Nasal Cannula Nasal Cannula Nasal Cannula Nasal Cannula


 


O2 Flow Rate 2.0 2.0 2.0 2.0


 


    





    





 7/29/20 7/29/20 7/29/20 7/29/20





 07:15 08:00 08:27 11:07


 


Temp 99.5   99.5





 99.5   99.5


 


Pulse 139   128


 


Resp 40   30


 


B/P (MAP) 136/79 (98)   136/79 (98)


 


Pulse Ox 95  96 94


 


O2 Delivery Nasal Cannula Nasal Cannula Nasal Cannula Nasal Cannula


 


O2 Flow Rate 2.0 2.0 2.0 2.0


 


    





    





 7/29/20   





 11:36   


 


Pulse Ox 94   


 


O2 Delivery Nasal Cannula   


 


O2 Flow Rate 2.0   














Intake and Output   


 


 7/28/20 7/28/20 7/29/20





 15:00 23:00 07:00


 


Intake Total 150 ml 1001 ml 1928 ml


 


Output Total 665 ml 765 ml 900 ml


 


Balance -515 ml 236 ml 1028 ml








Problem List


Problems


Medical Problems:


(1) Acute pancreatitis


Status: Acute  





(2) Cholelithiasis


Status: Acute  





Assessment


Biliary pancreatitis, necrotizing with multiple complications/interventions.  

Agonizingly slow improvement.


Plan of Care Note


Continue support.





Justicifation of Admission Dx:


Justifications for Admission:


Justification of Admission Dx:  Yes











MARCO ANTONIO LONGO MD          Jul 29, 2020 11:56

## 2020-07-29 NOTE — NUR
SS following up with discharge planning. SS reviewed pt chart and discussed with pt RN. Med 
Assist attempting to work with pt's daughters to complete disability application. Pt is 
currently requiring two liters nasal canula of oxygen. Pt has one ROBERT drain and one Avi 
drain. Pt has drainage on left ROBERT drain site and has ostomy bag on it. Pt has wound vac on 
midline. Pt wearing speaking valve intermittently and started tube feeds on 7/28/2020. Pt on 
PPN, IV Daptomycin, IV Micafungin, and IV Avycaz. Pt Max Assist with PT/OT. G tube remains. 
SS will continue to follow for discharge planning.

## 2020-07-29 NOTE — PDOC
Infectious Disease Note


Subjective


Subjective





States ok.  No N/SOAF/S/C. pain ok


On 2 L O2 by nasal cannula





ROS


ROS


/w neg





Vital Sign


Vital Signs





Vital Signs








  Date Time  Temp Pulse Resp B/P (MAP) Pulse Ox O2 Delivery O2 Flow Rate FiO2


 


7/29/20 03:00 99.0 126 32 119/76 (90) 99 Nasal Cannula 2.0 





 99.0       











Physical Exam


PHYSICAL EXAM


GENERAL: pt in bed, appears weak -comfortable -alert


HEENT: Pupils equal, oral cavity dry. OC/Op - Dry


NECK:  Tracheostomy - no JVD


LUNGS: Diminished aeration bases,  CT on left 


HEART:  S1, S2, 


ABDOMEN: Distended mild- bowel sounds hypoactive, soft, richardson x 2, ROBERT drains, 

G-J tube


: [place] in place 


EXTREMITIES: Trace generalized edema, no cyanosis. MARTHA hose bilaterally, 


SKIN: warm touch. No signs of rash.  


LUE- Previous PICC site without signs of complications. PIV s clean


NEURO: - Alert and responsive





Labs


Lab





Laboratory Tests








Test


 7/28/20


13:04 7/28/20


17:42 7/28/20


23:41 7/29/20


05:00


 


Glucose (Fingerstick)


 131 mg/dL


(70-99) 120 mg/dL


(70-99) 122 mg/dL


(70-99) 





 


White Blood Count


 


 


 


 12.4 x10^3/uL


(4.0-11.0)


 


Red Blood Count


 


 


 


 2.78 x10^6/uL


(3.50-5.40)


 


Hemoglobin


 


 


 


 7.5 g/dL


(12.0-15.5)


 


Hematocrit


 


 


 


 23.5 %


(36.0-47.0)


 


Mean Corpuscular Volume    84 fL () 


 


Mean Corpuscular Hemoglobin    27 pg (25-35) 


 


Mean Corpuscular Hemoglobin


Concent 


 


 


 32 g/dL


(31-37)


 


Red Cell Distribution Width


 


 


 


 16.0 %


(11.5-14.5)


 


Platelet Count


 


 


 


 626 x10^3/uL


(140-400)


 


Neutrophils (%) (Auto)    83 % (31-73) 


 


Lymphocytes (%) (Auto)    9 % (24-48) 


 


Monocytes (%) (Auto)    6 % (0-9) 


 


Eosinophils (%) (Auto)    2 % (0-3) 


 


Basophils (%) (Auto)    0 % (0-3) 


 


Neutrophils # (Auto)


 


 


 


 10.3 x10^3/uL


(1.8-7.7)


 


Lymphocytes # (Auto)


 


 


 


 1.1 x10^3/uL


(1.0-4.8)


 


Monocytes # (Auto)


 


 


 


 0.8 x10^3/uL


(0.0-1.1)


 


Eosinophils # (Auto)


 


 


 


 0.2 x10^3/uL


(0.0-0.7)


 


Basophils # (Auto)


 


 


 


 0.0 x10^3/uL


(0.0-0.2)


 


Sodium Level


 


 


 


 134 mmol/L


(136-145)


 


Potassium Level


 


 


 


 3.9 mmol/L


(3.5-5.1)


 


Chloride Level


 


 


 


 101 mmol/L


()


 


Carbon Dioxide Level


 


 


 


 26 mmol/L


(21-32)


 


Anion Gap    7 (6-14) 


 


Blood Urea Nitrogen


 


 


 


 10 mg/dL


(7-20)


 


Creatinine


 


 


 


 0.6 mg/dL


(0.6-1.0)


 


Estimated GFR


(Cockcroft-Gault) 


 


 


 106.3 





 


Glucose Level


 


 


 


 131 mg/dL


(70-99)


 


Calcium Level


 


 


 


 11.1 mg/dL


(8.5-10.1)


 


Test


 7/29/20


05:40 


 


 





 


Glucose (Fingerstick)


 126 mg/dL


(70-99) 


 


 











Micro


6/7 





GRAM STAIN  Final  


        Final





        GRAM NEGATIVE RODS:MODERATE


        SQUAMOUS EPI CELL:NOT APPLICABLE


        PMN (WBCs):RARE


        YEAST:MODERATE


        Unless otherwise specified, Testing Performed by:


        [address] [address]


        For Inquires, the Physician may contact the Microbiology


        department at [phone]





  ANAEROBIC-AEROBIC CULTURE  Preliminary  


        Preliminary





        MANY GRAM NEGATIVE RODS on 06/09/20 at 1154


        FINAL ID= [PSEUDOMONAS AERUGINOSA]


        PSEUDOMONAS AERUGINOSA





  ANTIMICROBIAL SUSCEPTIBILITY  Preliminary  


        Comment





        NEG ANSON 56


        PSEUDOMONAS AERUGINOSA


        ANTIBIOTIC                        RESULT          INTERPRETATION


        AMIKACIN                          <=16                  S


        AZTREONAM                         >16                   R


        CEFTAZIDIME                       >16                   R


        CIPROFLOXACIN                     <=0.25                S


        CEFEPIME                          16                    I


        CEFTAZIDIME/AVIBACTAM             <=4                   S


        GENTAMICIN                        <=2                   S














                               ** CONTINUED ON NEXT PAGE **





 

--------------------------------------------------------------------------------


------------





RUN DATE: 06/11/20                  [facility] *LIVE*               

  PAGE 2   


RUN TIME: 1016                            Specimen Inquiry                    


 

--------------------------------------------------------------------------------


------------





SPEC: [id number]    PATIENT: FRANCHESCA BEANALYX TA                [id number]  

(Continued)


---------------------------------

-----------------------------------------------------------








------------------------------------------------------------------

--------------------------





  Procedure                         Result                                      

         


---------

--------------------------------------------------------------------------------


---





  ANTIMICROBIAL SUSCEPTIBILITY  Preliminary   (continued)


        LEVOFLOXACIN                      <=0.5                 S


        MEROPENEM                         <=1                   S


        PIPERACILLIN/TAZOBACTAM           64                    S


        TOBRAMYCIN                        <=2                   S


        Unless otherwise specified, Testing Performed by:


        [facility]


        [address] [place] [address]


        For Inquires, the Physician may contact the Microbiology


        department at [phone]











CT Scan 6/6





IMPRESSION:


1. Removal of the percutaneous pigtail drainage catheters since the prior 


exam. Sequela of pancreatitis with extensive pseudocysts again 


demonstrated, the right-sided collections are slightly larger since the 


prior exam, the left-sided collections are stable. See above.


2. Moderate to large left pleural effusion with atelectasis and collapse 


of most of the left lower lobe, stable. Small right pleural effusion is 


stable.


3. Gallstone.





Objective


Assessment





Patient with prolonged hospitalization more than 4 months


Multiple medical problems


Multiple surgical procedures





S/P Exp. 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 p

arapsilosis & PSAE (I-merrem/ceftazidime/AZT/cefepime))


Leucocytosis - better ? reactive as clinically looks well


Fever - 7/25 c-diff neg


Anemia


Acute gallstone pancreatitis with persistent necrosis


  - 4/9.  CT A/P Increased ascites. Persistent evidence of necrotizing 

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


  - 4/27. status post ROBERT drain placement; C. parapsilosis. s/p drain 5/6 + yeast

& high amylase; s/p additional drain on 5/8. Drains removed. 


  -5/6. fluid  candida parapsilosis fluid, amylase high


  - 6/6 showed multiple pseudocysts, slight larger on the right. s/p drains x 3,

6/7.  + PSAE (MDRO-R Cefepime, Zosyn ANSON < 64) and yeast, 


  -6/7 s/p drain replacement x 3; fluid cult PSAE (MDRO), yeast; treated


  -7/12 CT A/P shows smaller fluid collections.  


  -722 CT abdomen and pelvis drains in place       


Ascites s/p paracentesis 4/15 & 5/6. C. parapsilosis 


Cholelithiasis with thickening of the gallbladder wall.


JED, Hyperkalemia, Metabolic acidosis off dialysis


Acute hypoxic resp failure. trach/vent. sputum 6/13  + PSAE (I merrem) ; sputum 

culture July 19+ for PSAE R Merrem, sensitive to cefepime


Pleural effusion status post CTS left side


 Abdominal fluid culture 6 /7 MDRO Pseudomonas, yeast


Sputum culture positive 7/19 for MDRO Pseudomonas


Chest tube fluid positive for 7/21 Candida Parapsilosis





Plan


Plan of Care





Continue Avycaz 7/23 /micafungin 6/30


Restart daptomycin 7/26(CK <7 7/28)


Ok to replace PICC from ID standpoint. PICC d/c'd 7/26


CBC/BMP in am


UA and urine culture Blood culture/Cath tip neg - pending


C. difficile negative


Monitor WBC/temp 


Wound care /drain management as directed


Contact isolation for CRE/MDRO








Critically ill





Long term prognosis  poor





D/w nursing











RASHAWN ROSEN MD              Jul 29, 2020 06:34 no

## 2020-07-29 NOTE — PDOC
SURGICAL PROGRESS NOTE


Subjective


Pt working with PT


ROBERT was lost last night


appears to be gradually improving


cont supportive care.


Vital Signs





Vital Signs








  Date Time  Temp Pulse Resp B/P (MAP) Pulse Ox O2 Delivery O2 Flow Rate FiO2


 


7/29/20 11:36     94 Nasal Cannula 2.0 


 


7/29/20 11:07 99.5 128 30 136/79 (98)    





 99.5       








I&O











Intake and Output 


 


 7/29/20





 07:00


 


Intake Total 3079 ml


 


Output Total 2330 ml


 


Balance 749 ml


 


 


 


IV Total 2395 ml


 


Tube Feeding 483 ml


 


Other 201 ml


 


Output Urine Total 1350 ml


 


Drainage Total 980 ml


 


# Bowel Movements 3








Labs





Laboratory Tests








Test


 7/27/20


12:46 7/27/20


17:29 7/27/20


23:53 7/28/20


05:45


 


Glucose (Fingerstick)


 141 mg/dL


(70-99) 135 mg/dL


(70-99) 106 mg/dL


(70-99) 





 


White Blood Count


 


 


 


 16.6 x10^3/uL


(4.0-11.0)


 


Red Blood Count


 


 


 


 2.90 x10^6/uL


(3.50-5.40)


 


Hemoglobin


 


 


 


 8.2 g/dL


(12.0-15.5)


 


Hematocrit


 


 


 


 24.4 %


(36.0-47.0)


 


Mean Corpuscular Volume    84 fL () 


 


Mean Corpuscular Hemoglobin    28 pg (25-35) 


 


Mean Corpuscular Hemoglobin


Concent 


 


 


 34 g/dL


(31-37)


 


Red Cell Distribution Width


 


 


 


 15.8 %


(11.5-14.5)


 


Platelet Count


 


 


 


 576 x10^3/uL


(140-400)


 


Neutrophils (%) (Auto)    84 % (31-73) 


 


Lymphocytes (%) (Auto)    10 % (24-48) 


 


Monocytes (%) (Auto)    4 % (0-9) 


 


Eosinophils (%) (Auto)    2 % (0-3) 


 


Basophils (%) (Auto)    1 % (0-3) 


 


Neutrophils # (Auto)


 


 


 


 13.9 x10^3/uL


(1.8-7.7)


 


Lymphocytes # (Auto)


 


 


 


 1.6 x10^3/uL


(1.0-4.8)


 


Monocytes # (Auto)


 


 


 


 0.7 x10^3/uL


(0.0-1.1)


 


Eosinophils # (Auto)


 


 


 


 0.3 x10^3/uL


(0.0-0.7)


 


Basophils # (Auto)


 


 


 


 0.1 x10^3/uL


(0.0-0.2)


 


Sodium Level


 


 


 


 134 mmol/L


(136-145)


 


Potassium Level


 


 


 


 4.6 mmol/L


(3.5-5.1)


 


Chloride Level


 


 


 


 102 mmol/L


()


 


Carbon Dioxide Level


 


 


 


 30 mmol/L


(21-32)


 


Anion Gap    2 (6-14) 


 


Blood Urea Nitrogen


 


 


 


 11 mg/dL


(7-20)


 


Creatinine


 


 


 


 0.6 mg/dL


(0.6-1.0)


 


Estimated GFR


(Cockcroft-Gault) 


 


 


 106.3 





 


BUN/Creatinine Ratio    18 (6-20) 


 


Glucose Level


 


 


 


 115 mg/dL


(70-99)


 


Calcium Level


 


 


 


 11.4 mg/dL


(8.5-10.1)


 


Total Bilirubin


 


 


 


 0.5 mg/dL


(0.2-1.0)


 


Aspartate Amino Transf


(AST/SGOT) 


 


 


 15 U/L (15-37) 





 


Alanine Aminotransferase


(ALT/SGPT) 


 


 


 14 U/L (14-59) 





 


Alkaline Phosphatase


 


 


 


 119 U/L


()


 


Creatine Kinase


 


 


 


 < 7 U/L


()


 


Total Protein


 


 


 


 5.5 g/dL


(6.4-8.2)


 


Albumin


 


 


 


 1.2 g/dL


(3.4-5.0)


 


Albumin/Globulin Ratio    0.3 (1.0-1.7) 


 


Test


 7/28/20


06:23 7/28/20


13:04 7/28/20


17:42 7/28/20


23:41


 


Glucose (Fingerstick)


 113 mg/dL


(70-99) 131 mg/dL


(70-99) 120 mg/dL


(70-99) 122 mg/dL


(70-99)


 


Test


 7/29/20


05:00 7/29/20


05:40 


 





 


White Blood Count


 12.4 x10^3/uL


(4.0-11.0) 


 


 





 


Red Blood Count


 2.78 x10^6/uL


(3.50-5.40) 


 


 





 


Hemoglobin


 7.5 g/dL


(12.0-15.5) 


 


 





 


Hematocrit


 23.5 %


(36.0-47.0) 


 


 





 


Mean Corpuscular Volume 84 fL ()    


 


Mean Corpuscular Hemoglobin 27 pg (25-35)    


 


Mean Corpuscular Hemoglobin


Concent 32 g/dL


(31-37) 


 


 





 


Red Cell Distribution Width


 16.0 %


(11.5-14.5) 


 


 





 


Platelet Count


 626 x10^3/uL


(140-400) 


 


 





 


Neutrophils (%) (Auto) 83 % (31-73)    


 


Lymphocytes (%) (Auto) 9 % (24-48)    


 


Monocytes (%) (Auto) 6 % (0-9)    


 


Eosinophils (%) (Auto) 2 % (0-3)    


 


Basophils (%) (Auto) 0 % (0-3)    


 


Neutrophils # (Auto)


 10.3 x10^3/uL


(1.8-7.7) 


 


 





 


Lymphocytes # (Auto)


 1.1 x10^3/uL


(1.0-4.8) 


 


 





 


Monocytes # (Auto)


 0.8 x10^3/uL


(0.0-1.1) 


 


 





 


Eosinophils # (Auto)


 0.2 x10^3/uL


(0.0-0.7) 


 


 





 


Basophils # (Auto)


 0.0 x10^3/uL


(0.0-0.2) 


 


 





 


Sodium Level


 134 mmol/L


(136-145) 


 


 





 


Potassium Level


 3.9 mmol/L


(3.5-5.1) 


 


 





 


Chloride Level


 101 mmol/L


() 


 


 





 


Carbon Dioxide Level


 26 mmol/L


(21-32) 


 


 





 


Anion Gap 7 (6-14)    


 


Blood Urea Nitrogen


 10 mg/dL


(7-20) 


 


 





 


Creatinine


 0.6 mg/dL


(0.6-1.0) 


 


 





 


Estimated GFR


(Cockcroft-Gault) 106.3 


 


 


 





 


Glucose Level


 131 mg/dL


(70-99) 


 


 





 


Calcium Level


 11.1 mg/dL


(8.5-10.1) 


 


 





 


Glucose (Fingerstick)


 


 126 mg/dL


(70-99) 


 











Laboratory Tests








Test


 7/28/20


13:04 7/28/20


17:42 7/28/20


23:41 7/29/20


05:00


 


Glucose (Fingerstick)


 131 mg/dL


(70-99) 120 mg/dL


(70-99) 122 mg/dL


(70-99) 





 


White Blood Count


 


 


 


 12.4 x10^3/uL


(4.0-11.0)


 


Red Blood Count


 


 


 


 2.78 x10^6/uL


(3.50-5.40)


 


Hemoglobin


 


 


 


 7.5 g/dL


(12.0-15.5)


 


Hematocrit


 


 


 


 23.5 %


(36.0-47.0)


 


Mean Corpuscular Volume    84 fL () 


 


Mean Corpuscular Hemoglobin    27 pg (25-35) 


 


Mean Corpuscular Hemoglobin


Concent 


 


 


 32 g/dL


(31-37)


 


Red Cell Distribution Width


 


 


 


 16.0 %


(11.5-14.5)


 


Platelet Count


 


 


 


 626 x10^3/uL


(140-400)


 


Neutrophils (%) (Auto)    83 % (31-73) 


 


Lymphocytes (%) (Auto)    9 % (24-48) 


 


Monocytes (%) (Auto)    6 % (0-9) 


 


Eosinophils (%) (Auto)    2 % (0-3) 


 


Basophils (%) (Auto)    0 % (0-3) 


 


Neutrophils # (Auto)


 


 


 


 10.3 x10^3/uL


(1.8-7.7)


 


Lymphocytes # (Auto)


 


 


 


 1.1 x10^3/uL


(1.0-4.8)


 


Monocytes # (Auto)


 


 


 


 0.8 x10^3/uL


(0.0-1.1)


 


Eosinophils # (Auto)


 


 


 


 0.2 x10^3/uL


(0.0-0.7)


 


Basophils # (Auto)


 


 


 


 0.0 x10^3/uL


(0.0-0.2)


 


Sodium Level


 


 


 


 134 mmol/L


(136-145)


 


Potassium Level


 


 


 


 3.9 mmol/L


(3.5-5.1)


 


Chloride Level


 


 


 


 101 mmol/L


()


 


Carbon Dioxide Level


 


 


 


 26 mmol/L


(21-32)


 


Anion Gap    7 (6-14) 


 


Blood Urea Nitrogen


 


 


 


 10 mg/dL


(7-20)


 


Creatinine


 


 


 


 0.6 mg/dL


(0.6-1.0)


 


Estimated GFR


(Cockcroft-Gault) 


 


 


 106.3 





 


Glucose Level


 


 


 


 131 mg/dL


(70-99)


 


Calcium Level


 


 


 


 11.1 mg/dL


(8.5-10.1)


 


Test


 7/29/20


05:40 


 


 





 


Glucose (Fingerstick)


 126 mg/dL


(70-99) 


 


 











Problem List


Problems


Medical Problems:


(1) Acute pancreatitis


Status: Acute  





(2) Cholelithiasis


Status: Acute  











Justicifation of Admission Dx:


Justifications for Admission:


Justification of Admission Dx:  Yes











GAMAL ZHOU MD             Jul 29, 2020 12:33

## 2020-07-29 NOTE — NUR
Allergies and reactions  Codeine

 

INR 1.1 (from 6/24/20)  BUN  10  Cr  0.6  Platelets 626

 

Blood culture done 7/26/20

blood culture results  Negative

 

Order Verified Yes

 

Consent signed Yes

 

Previous PICC placement Yes

 

Past Medical/Surgical history and current diagnosis reviewed Yes

 

Patient Medical /Surgical History Related to PICC line placement 

Diabetes

History of acute/chronic renal failure

Infectious Disease consult

Past central line or venous access device placement

Renal consult



Special considerations for PICC line placement 

Anticoagulation therapy 

 

PICC placement indication   

Long term antibiotic usage, 

Multiple/ Frequent blood draws, 

Poor peripheral intravenous access 

 

JAKUB Reyes PICC Nurse


-------------------------------------------------------------------------------

Addendum: 07/30/20 at 0135 by SHIRA NOLAN RN

-------------------------------------------------------------------------------

Amended: Links added.

## 2020-07-30 VITALS — DIASTOLIC BLOOD PRESSURE: 85 MMHG | SYSTOLIC BLOOD PRESSURE: 144 MMHG

## 2020-07-30 VITALS — SYSTOLIC BLOOD PRESSURE: 137 MMHG | DIASTOLIC BLOOD PRESSURE: 82 MMHG

## 2020-07-30 VITALS — DIASTOLIC BLOOD PRESSURE: 83 MMHG | SYSTOLIC BLOOD PRESSURE: 150 MMHG

## 2020-07-30 VITALS — DIASTOLIC BLOOD PRESSURE: 69 MMHG | SYSTOLIC BLOOD PRESSURE: 144 MMHG

## 2020-07-30 VITALS — DIASTOLIC BLOOD PRESSURE: 82 MMHG | SYSTOLIC BLOOD PRESSURE: 157 MMHG

## 2020-07-30 VITALS — SYSTOLIC BLOOD PRESSURE: 157 MMHG | DIASTOLIC BLOOD PRESSURE: 80 MMHG

## 2020-07-30 LAB
ANION GAP SERPL CALC-SCNC: 6 MMOL/L (ref 6–14)
BASOPHILS # BLD AUTO: 0 X10^3/UL (ref 0–0.2)
BASOPHILS NFR BLD: 0 % (ref 0–3)
BUN SERPL-MCNC: 9 MG/DL (ref 7–20)
CALCIUM SERPL-MCNC: 11.3 MG/DL (ref 8.5–10.1)
CHLORIDE SERPL-SCNC: 102 MMOL/L (ref 98–107)
CO2 SERPL-SCNC: 26 MMOL/L (ref 21–32)
CREAT SERPL-MCNC: 0.5 MG/DL (ref 0.6–1)
EOSINOPHIL NFR BLD: 0.1 X10^3/UL (ref 0–0.7)
EOSINOPHIL NFR BLD: 1 % (ref 0–3)
ERYTHROCYTE [DISTWIDTH] IN BLOOD BY AUTOMATED COUNT: 16.4 % (ref 11.5–14.5)
GFR SERPLBLD BASED ON 1.73 SQ M-ARVRAT: 131.1 ML/MIN
GLUCOSE SERPL-MCNC: 118 MG/DL (ref 70–99)
HCT VFR BLD CALC: 23.6 % (ref 36–47)
HGB BLD-MCNC: 7.6 G/DL (ref 12–15.5)
LYMPHOCYTES # BLD: 1.4 X10^3/UL (ref 1–4.8)
LYMPHOCYTES NFR BLD AUTO: 12 % (ref 24–48)
MCH RBC QN AUTO: 27 PG (ref 25–35)
MCHC RBC AUTO-ENTMCNC: 32 G/DL (ref 31–37)
MCV RBC AUTO: 85 FL (ref 79–100)
MONO #: 0.8 X10^3/UL (ref 0–1.1)
MONOCYTES NFR BLD: 7 % (ref 0–9)
NEUT #: 9.2 X10^3/UL (ref 1.8–7.7)
NEUTROPHILS NFR BLD AUTO: 80 % (ref 31–73)
PLATELET # BLD AUTO: 671 X10^3/UL (ref 140–400)
POTASSIUM SERPL-SCNC: 3.7 MMOL/L (ref 3.5–5.1)
RBC # BLD AUTO: 2.77 X10^6/UL (ref 3.5–5.4)
SODIUM SERPL-SCNC: 134 MMOL/L (ref 136–145)
WBC # BLD AUTO: 11.5 X10^3/UL (ref 4–11)

## 2020-07-30 PROCEDURE — B548ZZA ULTRASONOGRAPHY OF SUPERIOR VENA CAVA, GUIDANCE: ICD-10-PCS | Performed by: RADIOLOGY

## 2020-07-30 PROCEDURE — 02HV33Z INSERTION OF INFUSION DEVICE INTO SUPERIOR VENA CAVA, PERCUTANEOUS APPROACH: ICD-10-PCS | Performed by: RADIOLOGY

## 2020-07-30 PROCEDURE — B5181ZA FLUOROSCOPY OF SUPERIOR VENA CAVA USING LOW OSMOLAR CONTRAST, GUIDANCE: ICD-10-PCS | Performed by: RADIOLOGY

## 2020-07-30 RX ADMIN — FENTANYL CITRATE PRN MCG: 50 INJECTION INTRAMUSCULAR; INTRAVENOUS at 12:51

## 2020-07-30 RX ADMIN — ACETYLCYSTEINE SCH MG: 200 INHALANT RESPIRATORY (INHALATION) at 20:51

## 2020-07-30 RX ADMIN — IPRATROPIUM BROMIDE AND ALBUTEROL SULFATE SCH ML: .5; 3 SOLUTION RESPIRATORY (INHALATION) at 16:01

## 2020-07-30 RX ADMIN — IPRATROPIUM BROMIDE AND ALBUTEROL SULFATE SCH ML: .5; 3 SOLUTION RESPIRATORY (INHALATION) at 08:35

## 2020-07-30 RX ADMIN — IPRATROPIUM BROMIDE AND ALBUTEROL SULFATE SCH ML: .5; 3 SOLUTION RESPIRATORY (INHALATION) at 11:44

## 2020-07-30 RX ADMIN — ENOXAPARIN SODIUM SCH MG: 40 INJECTION SUBCUTANEOUS at 08:04

## 2020-07-30 RX ADMIN — GLYCERIN, ISOLEUCINE, LEUCINE, LYSINE, METHIONINE, PHENYLALANINE, THREONINE, TRYPTOPHAN, VALINE, ALANINE, GLYCINE, ARGININE, HISTIDINE, PROLINE, SERINE, CYSTEINE, SODIUM ACETATE, MAGNESIUM ACETATE, CALCIUM ACETATE, SODIUM CHLORIDE, POTASSIUM CHLORIDE, PHOSPHORIC ACID, AND POTASSIUM METABISULFITE SCH MLS/HR
3; .21; .27; .22; .16; .17; .12; .046; .2; .21; .42; .29; .085; .34; .18; .014; .2; .054; .026; .12; .15; .041 INJECTION INTRAVENOUS at 04:32

## 2020-07-30 RX ADMIN — CEFTAZIDIME, AVIBACTAM SCH MLS/HR: 2; .5 POWDER, FOR SOLUTION INTRAVENOUS at 22:59

## 2020-07-30 RX ADMIN — DEXTROSE SCH MLS/HR: 50 INJECTION, SOLUTION INTRAVENOUS at 08:05

## 2020-07-30 RX ADMIN — CEFTAZIDIME, AVIBACTAM SCH MLS/HR: 2; .5 POWDER, FOR SOLUTION INTRAVENOUS at 06:09

## 2020-07-30 RX ADMIN — FENTANYL CITRATE PRN MCG: 50 INJECTION INTRAMUSCULAR; INTRAVENOUS at 09:08

## 2020-07-30 RX ADMIN — IPRATROPIUM BROMIDE AND ALBUTEROL SULFATE SCH ML: .5; 3 SOLUTION RESPIRATORY (INHALATION) at 03:24

## 2020-07-30 RX ADMIN — INSULIN LISPRO SCH UNITS: 100 INJECTION, SOLUTION INTRAVENOUS; SUBCUTANEOUS at 23:02

## 2020-07-30 RX ADMIN — FENTANYL CITRATE PRN MCG: 50 INJECTION INTRAMUSCULAR; INTRAVENOUS at 20:50

## 2020-07-30 RX ADMIN — GLYCERIN, ISOLEUCINE, LEUCINE, LYSINE, METHIONINE, PHENYLALANINE, THREONINE, TRYPTOPHAN, VALINE, ALANINE, GLYCINE, ARGININE, HISTIDINE, PROLINE, SERINE, CYSTEINE, SODIUM ACETATE, MAGNESIUM ACETATE, CALCIUM ACETATE, SODIUM CHLORIDE, POTASSIUM CHLORIDE, PHOSPHORIC ACID, AND POTASSIUM METABISULFITE SCH MLS/HR
3; .21; .27; .22; .16; .17; .12; .046; .2; .21; .42; .29; .085; .34; .18; .014; .2; .054; .026; .12; .15; .041 INJECTION INTRAVENOUS at 16:47

## 2020-07-30 RX ADMIN — INSULIN LISPRO SCH UNITS: 100 INJECTION, SOLUTION INTRAVENOUS; SUBCUTANEOUS at 00:00

## 2020-07-30 RX ADMIN — IPRATROPIUM BROMIDE AND ALBUTEROL SULFATE SCH ML: .5; 3 SOLUTION RESPIRATORY (INHALATION) at 20:51

## 2020-07-30 RX ADMIN — ACETYLCYSTEINE SCH MG: 200 INHALANT RESPIRATORY (INHALATION) at 08:35

## 2020-07-30 RX ADMIN — PANTOPRAZOLE SODIUM SCH MG: 40 INJECTION, POWDER, FOR SOLUTION INTRAVENOUS at 08:04

## 2020-07-30 RX ADMIN — DAPTOMYCIN SCH MLS/HR: 500 INJECTION, POWDER, LYOPHILIZED, FOR SOLUTION INTRAVENOUS at 09:09

## 2020-07-30 RX ADMIN — INSULIN LISPRO SCH UNITS: 100 INJECTION, SOLUTION INTRAVENOUS; SUBCUTANEOUS at 12:00

## 2020-07-30 RX ADMIN — INSULIN LISPRO SCH UNITS: 100 INJECTION, SOLUTION INTRAVENOUS; SUBCUTANEOUS at 17:55

## 2020-07-30 RX ADMIN — CEFTAZIDIME, AVIBACTAM SCH MLS/HR: 2; .5 POWDER, FOR SOLUTION INTRAVENOUS at 13:32

## 2020-07-30 RX ADMIN — INSULIN LISPRO SCH UNITS: 100 INJECTION, SOLUTION INTRAVENOUS; SUBCUTANEOUS at 06:00

## 2020-07-30 NOTE — PDOC
Infectious Disease Note


Subjective


Subjective





States ok.  No N/SOAF/S/C. pain ok





Vital Sign


Vital Signs





Vital Signs








  Date Time  Temp Pulse Resp B/P (MAP) Pulse Ox O2 Delivery O2 Flow Rate FiO2


 


7/30/20 03:25     95 Nasal Cannula 2.0 


 


7/30/20 03:16 97.8 116 24 150/83 (105)    





 97.8       











Physical Exam


PHYSICAL EXAM


GENERAL: pt in bed, appears weak -comfortable -alert


HEENT: Pupils equal, oral cavity dry. OC/Op - Dry


NECK:  Tracheostomy - no JVD


LUNGS: Diminished aeration bases,  CT on left 


HEART:  S1, S2, 


ABDOMEN: Distended mild- bowel sounds hypoactive, soft, richardson x 2, ROBERT drains, 

G-J tube


: [place] in place 


EXTREMITIES: Trace generalized edema, no cyanosis. 


SKIN: warm touch. No signs of rash.  


LUE- Previous PICC site without signs of complications. PIV s clean


NEURO: - Alert and responsive


PIVs clean





Labs


Lab





Laboratory Tests








Test


 7/29/20


12:35 7/29/20


18:11 7/29/20


23:54 7/30/20


04:25


 


Glucose (Fingerstick)


 152 mg/dL


(70-99) 120 mg/dL


(70-99) 109 mg/dL


(70-99) 





 


White Blood Count


 


 


 


 11.5 x10^3/uL


(4.0-11.0)


 


Red Blood Count


 


 


 


 2.77 x10^6/uL


(3.50-5.40)


 


Hemoglobin


 


 


 


 7.6 g/dL


(12.0-15.5)


 


Hematocrit


 


 


 


 23.6 %


(36.0-47.0)


 


Mean Corpuscular Volume    85 fL () 


 


Mean Corpuscular Hemoglobin    27 pg (25-35) 


 


Mean Corpuscular Hemoglobin


Concent 


 


 


 32 g/dL


(31-37)


 


Red Cell Distribution Width


 


 


 


 16.4 %


(11.5-14.5)


 


Platelet Count


 


 


 


 671 x10^3/uL


(140-400)


 


Neutrophils (%) (Auto)    80 % (31-73) 


 


Lymphocytes (%) (Auto)    12 % (24-48) 


 


Monocytes (%) (Auto)    7 % (0-9) 


 


Eosinophils (%) (Auto)    1 % (0-3) 


 


Basophils (%) (Auto)    0 % (0-3) 


 


Neutrophils # (Auto)


 


 


 


 9.2 x10^3/uL


(1.8-7.7)


 


Lymphocytes # (Auto)


 


 


 


 1.4 x10^3/uL


(1.0-4.8)


 


Monocytes # (Auto)


 


 


 


 0.8 x10^3/uL


(0.0-1.1)


 


Eosinophils # (Auto)


 


 


 


 0.1 x10^3/uL


(0.0-0.7)


 


Basophils # (Auto)


 


 


 


 0.0 x10^3/uL


(0.0-0.2)


 


Sodium Level


 


 


 


 134 mmol/L


(136-145)


 


Potassium Level


 


 


 


 3.7 mmol/L


(3.5-5.1)


 


Chloride Level


 


 


 


 102 mmol/L


()


 


Carbon Dioxide Level


 


 


 


 26 mmol/L


(21-32)


 


Anion Gap    6 (6-14) 


 


Blood Urea Nitrogen    9 mg/dL (7-20) 


 


Creatinine


 


 


 


 0.5 mg/dL


(0.6-1.0)


 


Estimated GFR


(Cockcroft-Gault) 


 


 


 131.1 





 


Glucose Level


 


 


 


 118 mg/dL


(70-99)


 


Calcium Level


 


 


 


 11.3 mg/dL


(8.5-10.1)








Micro


6/7 





GRAM STAIN  Final  


        Final





        GRAM NEGATIVE RODS:MODERATE


        SQUAMOUS EPI CELL:NOT APPLICABLE


        PMN (WBCs):RARE


        YEAST:MODERATE


        Unless otherwise specified, Testing Performed by:


        [address] [address]


        For Inquires, the Physician may contact the Microbiology


        department at [phone]





  ANAEROBIC-AEROBIC CULTURE  Preliminary  


        Preliminary





        MANY GRAM NEGATIVE RODS on 06/09/20 at 1156


        FINAL ID= [PSEUDOMONAS AERUGINOSA]


        PSEUDOMONAS AERUGINOSA





  ANTIMICROBIAL SUSCEPTIBILITY  Preliminary  


        Comment





        NEG ANSON 56


        PSEUDOMONAS AERUGINOSA


        ANTIBIOTIC                        RESULT          INTERPRETATION


        AMIKACIN                          <=16                  S


        AZTREONAM                         >16                   R


        CEFTAZIDIME                       >16                   R


        CIPROFLOXACIN                     <=0.25                S


        CEFEPIME                          16                    I


        CEFTAZIDIME/AVIBACTAM             <=4                   S


        GENTAMICIN                        <=2                   S














                               ** CONTINUED ON NEXT PAGE **





 

--------------------------------------------------------------------------------


------------





RUN DATE: 06/11/20                  [facility] *LIVE*               

  PAGE 2   


RUN TIME: 1016                            Specimen Inquiry                    


 

--------------------------------------------------------------------------------


------------





SPEC: [id number]    PATIENT: JESENIA BEAN                [id number]  

(Continued)


-----------------------------------------------

---------------------------------------------








--------------------------------------------------------------------------------

------------





  Procedure                         Result                                      

         


-----------------------

---------------------------------------------------------------------





  ANTIMICROBIAL SUSCEPTIBILITY  Preliminary   (continued)


        LEVOFLOXACIN                      <=0.5                 S


        MEROPENEM                         <=1                   S


        PIPERACILLIN/TAZOBACTAM           64                    S


        TOBRAMYCIN                        <=2                   S


        Unless otherwise specified, Testing Performed by:


        [address] [address]


        For Inquires, the Physician may contact the Microbiology


        department at [phone]











CT Scan 6/6





IMPRESSION:


1. Removal of the percutaneous pigtail drainage catheters since the prior 


exam. Sequela of pancreatitis with extensive pseudocysts again 


demonstrated, the right-sided collections are slightly larger since the 


prior exam, the left-sided collections are stable. See above.


2. Moderate to large left pleural effusion with atelectasis and collapse 


of most of the left lower lobe, stable. Small right pleural effusion is 


stable.


3. Gallstone.





Objective


Assessment





Patient with prolonged hospitalization more than 4 months


Multiple medical problems


Multiple surgical procedures





S/P Exp. 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 

parapsilosis & PSAE (I-merrem/ceftazidime/AZT/cefepime))


Leucocytosis - better ? reactive as clinically looks well


Fever - 7/25 c-diff neg


Anemia


Acute gallstone pancreatitis with persistent necrosis


  - 4/9.  CT A/P Increased ascites. Persistent evidence of necrotizing 

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


  - 4/27. status post ROBERT drain placement; C. parapsilosis. s/p drain 5/6 + yeast

& high amylase; s/p additional drain on 5/8. Drains removed. 


  -5/6. fluid  candida parapsilosis fluid, amylase high


  - 6/6 showed multiple pseudocysts, slight larger on the right. s/p drains x 3,

6/7.  + PSAE (MDRO-R Cefepime, Zosyn ANSON < 64) and yeast, 


  -6/7 s/p drain replacement x 3; fluid cult PSAE (MDRO), yeast; treated


  -7/12 CT A/P shows smaller fluid collections.  


  -722 CT abdomen and pelvis drains in place       


Ascites s/p paracentesis 4/15 & 5/6. C. parapsilosis 


Cholelithiasis with thickening of the gallbladder wall.


JED, Hyperkalemia, Metabolic acidosis off dialysis


Acute hypoxic resp failure. trach/vent. sputum 6/13  + PSAE (I merrem) ; sputum 

culture July 19+ for PSAE R Merrem, sensitive to cefepime


Pleural effusion status post CTS left side


 Abdominal fluid culture 6 /7 MDRO Pseudomonas, yeast


Sputum culture positive 7/19 for MDRO Pseudomonas


Chest tube fluid positive for 7/21 Candida Parapsilosis





Plan


Plan of Care





Continue Avycaz 7/23 /micafungin 6/30


Restart daptomycin 7/26(CK <7 7/28) wean soon


Ok to replace PICC from ID standpoint. PICC d/c'd 7/26


CBC/BMP in am


UA and urine culture Blood culture/Cath tip neg - pending


C. difficile negative


Monitor WBC/temp 


Wound care /drain management as directed


Contact isolation for CRE/MDRO








Critically ill





Long term prognosis  poor





D/w nursing











RASHAWN ROSEN MD              Jul 30, 2020 06:20

## 2020-07-30 NOTE — PDOC
SURGICAL PROGRESS NOTE


Subjective


complaints of pain under sump drain


Vital Signs





Vital Signs








  Date Time  Temp Pulse Resp B/P (MAP) Pulse Ox O2 Delivery O2 Flow Rate FiO2


 


7/30/20 09:08   28  95 Room Air  


 


7/30/20 08:39       2.0 


 


7/30/20 07:00 98.4 110  157/80 (105)    





 98.4       








I&O











Intake and Output 


 


 7/30/20





 07:00


 


Intake Total 1810 ml


 


Output Total 1885 ml


 


Balance -75 ml


 


 


 


Intake Oral 0 ml


 


IV Total 1250 ml


 


Tube Feeding 500 ml


 


Other 60 ml


 


Output Urine Total 825 ml


 


Gastric Drainage Total 745 ml


 


Drainage Total 315 ml


 


# Bowel Movements 2








General:  Alert, Cooperative


Abdomen:  Soft, Other (drains in place)


Labs





Laboratory Tests








Test


 7/28/20


13:04 7/28/20


17:42 7/28/20


23:41 7/29/20


05:00


 


Glucose (Fingerstick)


 131 mg/dL


(70-99) 120 mg/dL


(70-99) 122 mg/dL


(70-99) 





 


White Blood Count


 


 


 


 12.4 x10^3/uL


(4.0-11.0)


 


Red Blood Count


 


 


 


 2.78 x10^6/uL


(3.50-5.40)


 


Hemoglobin


 


 


 


 7.5 g/dL


(12.0-15.5)


 


Hematocrit


 


 


 


 23.5 %


(36.0-47.0)


 


Mean Corpuscular Volume    84 fL () 


 


Mean Corpuscular Hemoglobin    27 pg (25-35) 


 


Mean Corpuscular Hemoglobin


Concent 


 


 


 32 g/dL


(31-37)


 


Red Cell Distribution Width


 


 


 


 16.0 %


(11.5-14.5)


 


Platelet Count


 


 


 


 626 x10^3/uL


(140-400)


 


Neutrophils (%) (Auto)    83 % (31-73) 


 


Lymphocytes (%) (Auto)    9 % (24-48) 


 


Monocytes (%) (Auto)    6 % (0-9) 


 


Eosinophils (%) (Auto)    2 % (0-3) 


 


Basophils (%) (Auto)    0 % (0-3) 


 


Neutrophils # (Auto)


 


 


 


 10.3 x10^3/uL


(1.8-7.7)


 


Lymphocytes # (Auto)


 


 


 


 1.1 x10^3/uL


(1.0-4.8)


 


Monocytes # (Auto)


 


 


 


 0.8 x10^3/uL


(0.0-1.1)


 


Eosinophils # (Auto)


 


 


 


 0.2 x10^3/uL


(0.0-0.7)


 


Basophils # (Auto)


 


 


 


 0.0 x10^3/uL


(0.0-0.2)


 


Sodium Level


 


 


 


 134 mmol/L


(136-145)


 


Potassium Level


 


 


 


 3.9 mmol/L


(3.5-5.1)


 


Chloride Level


 


 


 


 101 mmol/L


()


 


Carbon Dioxide Level


 


 


 


 26 mmol/L


(21-32)


 


Anion Gap    7 (6-14) 


 


Blood Urea Nitrogen


 


 


 


 10 mg/dL


(7-20)


 


Creatinine


 


 


 


 0.6 mg/dL


(0.6-1.0)


 


Estimated GFR


(Cockcroft-Gault) 


 


 


 106.3 





 


Glucose Level


 


 


 


 131 mg/dL


(70-99)


 


Calcium Level


 


 


 


 11.1 mg/dL


(8.5-10.1)


 


Test


 7/29/20


05:40 7/29/20


12:35 7/29/20


18:11 7/29/20


23:54


 


Glucose (Fingerstick)


 126 mg/dL


(70-99) 152 mg/dL


(70-99) 120 mg/dL


(70-99) 109 mg/dL


(70-99)


 


Test


 7/30/20


04:25 7/30/20


06:23 


 





 


White Blood Count


 11.5 x10^3/uL


(4.0-11.0) 


 


 





 


Red Blood Count


 2.77 x10^6/uL


(3.50-5.40) 


 


 





 


Hemoglobin


 7.6 g/dL


(12.0-15.5) 


 


 





 


Hematocrit


 23.6 %


(36.0-47.0) 


 


 





 


Mean Corpuscular Volume 85 fL ()    


 


Mean Corpuscular Hemoglobin 27 pg (25-35)    


 


Mean Corpuscular Hemoglobin


Concent 32 g/dL


(31-37) 


 


 





 


Red Cell Distribution Width


 16.4 %


(11.5-14.5) 


 


 





 


Platelet Count


 671 x10^3/uL


(140-400) 


 


 





 


Neutrophils (%) (Auto) 80 % (31-73)    


 


Lymphocytes (%) (Auto) 12 % (24-48)    


 


Monocytes (%) (Auto) 7 % (0-9)    


 


Eosinophils (%) (Auto) 1 % (0-3)    


 


Basophils (%) (Auto) 0 % (0-3)    


 


Neutrophils # (Auto)


 9.2 x10^3/uL


(1.8-7.7) 


 


 





 


Lymphocytes # (Auto)


 1.4 x10^3/uL


(1.0-4.8) 


 


 





 


Monocytes # (Auto)


 0.8 x10^3/uL


(0.0-1.1) 


 


 





 


Eosinophils # (Auto)


 0.1 x10^3/uL


(0.0-0.7) 


 


 





 


Basophils # (Auto)


 0.0 x10^3/uL


(0.0-0.2) 


 


 





 


Sodium Level


 134 mmol/L


(136-145) 


 


 





 


Potassium Level


 3.7 mmol/L


(3.5-5.1) 


 


 





 


Chloride Level


 102 mmol/L


() 


 


 





 


Carbon Dioxide Level


 26 mmol/L


(21-32) 


 


 





 


Anion Gap 6 (6-14)    


 


Blood Urea Nitrogen 9 mg/dL (7-20)    


 


Creatinine


 0.5 mg/dL


(0.6-1.0) 


 


 





 


Estimated GFR


(Cockcroft-Gault) 131.1 


 


 


 





 


Glucose Level


 118 mg/dL


(70-99) 


 


 





 


Calcium Level


 11.3 mg/dL


(8.5-10.1) 


 


 





 


Glucose (Fingerstick)


 


 115 mg/dL


(70-99) 


 











Laboratory Tests








Test


 7/29/20


12:35 7/29/20


18:11 7/29/20


23:54 7/30/20


04:25


 


Glucose (Fingerstick)


 152 mg/dL


(70-99) 120 mg/dL


(70-99) 109 mg/dL


(70-99) 





 


White Blood Count


 


 


 


 11.5 x10^3/uL


(4.0-11.0)


 


Red Blood Count


 


 


 


 2.77 x10^6/uL


(3.50-5.40)


 


Hemoglobin


 


 


 


 7.6 g/dL


(12.0-15.5)


 


Hematocrit


 


 


 


 23.6 %


(36.0-47.0)


 


Mean Corpuscular Volume    85 fL () 


 


Mean Corpuscular Hemoglobin    27 pg (25-35) 


 


Mean Corpuscular Hemoglobin


Concent 


 


 


 32 g/dL


(31-37)


 


Red Cell Distribution Width


 


 


 


 16.4 %


(11.5-14.5)


 


Platelet Count


 


 


 


 671 x10^3/uL


(140-400)


 


Neutrophils (%) (Auto)    80 % (31-73) 


 


Lymphocytes (%) (Auto)    12 % (24-48) 


 


Monocytes (%) (Auto)    7 % (0-9) 


 


Eosinophils (%) (Auto)    1 % (0-3) 


 


Basophils (%) (Auto)    0 % (0-3) 


 


Neutrophils # (Auto)


 


 


 


 9.2 x10^3/uL


(1.8-7.7)


 


Lymphocytes # (Auto)


 


 


 


 1.4 x10^3/uL


(1.0-4.8)


 


Monocytes # (Auto)


 


 


 


 0.8 x10^3/uL


(0.0-1.1)


 


Eosinophils # (Auto)


 


 


 


 0.1 x10^3/uL


(0.0-0.7)


 


Basophils # (Auto)


 


 


 


 0.0 x10^3/uL


(0.0-0.2)


 


Sodium Level


 


 


 


 134 mmol/L


(136-145)


 


Potassium Level


 


 


 


 3.7 mmol/L


(3.5-5.1)


 


Chloride Level


 


 


 


 102 mmol/L


()


 


Carbon Dioxide Level


 


 


 


 26 mmol/L


(21-32)


 


Anion Gap    6 (6-14) 


 


Blood Urea Nitrogen    9 mg/dL (7-20) 


 


Creatinine


 


 


 


 0.5 mg/dL


(0.6-1.0)


 


Estimated GFR


(Cockcroft-Gault) 


 


 


 131.1 





 


Glucose Level


 


 


 


 118 mg/dL


(70-99)


 


Calcium Level


 


 


 


 11.3 mg/dL


(8.5-10.1)


 


Test


 7/30/20


06:23 


 


 





 


Glucose (Fingerstick)


 115 mg/dL


(70-99) 


 


 











Problem List


Problems


Medical Problems:


(1) Acute pancreatitis


Status: Acute  





(2) Cholelithiasis


Status: Acute  








Assessment/Plan


d/w Dr Flores--need to keep sump drain in place





Justicifation of Admission Dx:


Justifications for Admission:


Justification of Admission Dx:  Yes











SAURAV NASH APRN            Jul 30, 2020 09:30

## 2020-07-30 NOTE — PDOC
[facility] PROGRESS NOTE


Chief Complaint


Chief Complaint


S/P Exp. 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 

parapsilosis & PSAE (I-merrem/ceftazidime/AZT/cefepime))


Leucocytosis -trending upward 


Fever 


Acute gallstone pancreatitis with persistent necrosis


Postop (Exploratory laparotomy, lysis of adhesions, subtotal cholecystectomy 

with cholangiogram, gastrojejunostomy tube placement, pancreatic necrosectomy)


Acute hypoxic Respiratory failure required  mechanical ventilation


Tracheostomy


bilateral pleural effusions/pulm edema s/p Throacentesis on 6/15/2020


Severe Acute gallstone pancreatitis (not a surgical candidate at this time) with

necrosis


Acute kidney failure now requiring dialysis


Gallstones (Calculus of gallbladder with acute cholecystitis without obstru

ction)


HTN 


Intractable pain


Intractable nausea


Covid 19 negative. 


Acute on chronic anemia 


EEG: No seizure activity


Fever  - intermittent 


? Ileus with vomiting


Abd distention - U/S and CT reviewed s/p 0.4 L of opaque, debris-containing 

ascites was removed 5/6


Acute pancreatitis with persistent necrosis


Gallstone pancreatitis with necrosis. 


   -CT A/P 6/6 showed multiple pseudocysts, slight larger on the right. s/p 

drains x 3, 6/7.  + PSAE (MDRO-R Cefepime, Zosyn ANSON < 64) and yeast, 


   -s/p drain 4/27. C. parapsilosis. s/p drain 5/6 + yeast & high amylase; s/p 

additional drain on 5/8. Drains removed. 


Ascites s/p paracentesis 4/15 & 5/6. C. parapsilosis 


JED. off HD. 


A large fluid collection in the pancreatic bed has slightly decreased in size, 

described below, the pancreas itself is difficult to  visualize, which could be 

due to necrosis or obscuration of pancreatic  parenchyma from the surrounding 

fluid collection.6/15 


- 4/27 status post ROBERT drain placement + C paropsilosis. s/p additional drains 

5/8


Anemia - S/p PRBCs. 


Cholelithiasis with thickening of the gallbladder wall.


Leucocytosis improving


JED, hyperkalemia, Metabolic acidosis off dialysis


hypocalcemia 


Prediabetes


HTN


s/p trach


Hyperglycemia


severe protein-caloric malnutrition


Moderate to large left pleural effusion with atelectasis and collapse  of most 

of the left lower lobe, stable


Extensive retroperitoneal fluid collections persist. Percutaneous  drains remain

within the collections in both paracolic gutters. These  communicate with additi

onal pelvic and peripancreatic collections.











PLAN================











Continue Avycaz /micafungin, DC dapto per ID recommendations


Negative C diff


BC from 7/15 neg to date 


7/26 PICC line removed will start PPN for 24 hours then replace PICC line other 

side


Follow surgery input regarding diet and drains, pending GJ tube placement with 

IR before restarting tube feeds.


DVT GI prophylaxis


f/u BC /resp cultures


continue DVT/GI PPX





Discussed with RN

















40 MIN CC TIME





History of Present Illness


History of Present Illness


7/30/2020


Patient seen and examined bedside.  No acute events overnight.  Patient's chart,

labs, images were reviewed and discussed with RN








7/28/2020


Patient seen and examined bedside.  Patient appears to be in no obvious pain.  

All drains have been adequately draining.  Patient continues to be on TPN.  

Chart labs and imaging was reviewed.  Negative fluid balance of 270 cc


7/27/2020


Patient seen and examined in the ICU.  Patient still requires extensive care.  

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  Chart, labs, imaging was 

reviewed.  UOP with + 500cc balance.








7/25 /2020





Patient seen in and examined in the ICU


She is still extremely critically ill


Appears weak, frail, and pale


Better color today with improved eye contact and expression


Discussed with RN


Chart reviewed











 








7/21/2020


Patient seen and examined in the ICU


She is still extremely critically ill


Appears extremely weak frail and pale


Discussed with RN


Chart reviewed





Vitals/I&O


Vitals/I&O:





                                   Vital Signs








  Date Time  Temp Pulse Resp B/P (MAP) Pulse Ox O2 Delivery O2 Flow Rate FiO2


 


7/30/20 11:46     96 Nasal Cannula 2.0 


 


7/30/20 09:38   28     


 


7/30/20 07:00 98.4 110  157/80 (105)    





 98.4       














                                    I & O   


 


 7/29/20 7/29/20 7/30/20





 15:00 23:00 07:00


 


Intake Total 200 ml 300 ml 1310 ml


 


Output Total 300 ml 825 ml 760 ml


 


Balance -100 ml -525 ml 550 ml











Physical Exam


Physical Exam:


GENERAL: pt in bed, appears weak -comfortable -alert


HEENT: Pupils equal, oral cavity dry. OC/Op - Dry


NECK:  Tracheostomy - no JVD


LUNGS: Diminished aeration bases,  CT on left 


HEART:  S1, S2, 


ABDOMEN: Distended mild- bowel sounds hypoactive, soft, richardson x 2, ROBERT drains, 

G-J tube


: [place] in place 


EXTREMITIES: Trace generalized edema, no cyanosis. 


SKIN: warm touch. No signs of rash.  


LUE- Previous PICC site without signs of complications. PIV s clean


NEURO: - Alert and responsive


PIVs clean


General:  Alert, Cooperative


Heart:  Other (distant heart sounds, tachycardic)


Lungs:  Crackles


Abdomen:  Soft, Other (drains in place)


Extremities:  Other (Diffuse edema)


Skin:  No rashes, No significant lesion





Labs


Labs:





Laboratory Tests








Test


 7/29/20


12:35 7/29/20


18:11 7/29/20


23:54 7/30/20


04:25


 


Glucose (Fingerstick)


 152 mg/dL


(70-99) 120 mg/dL


(70-99) 109 mg/dL


(70-99) 





 


White Blood Count


 


 


 


 11.5 x10^3/uL


(4.0-11.0)


 


Red Blood Count


 


 


 


 2.77 x10^6/uL


(3.50-5.40)


 


Hemoglobin


 


 


 


 7.6 g/dL


(12.0-15.5)


 


Hematocrit


 


 


 


 23.6 %


(36.0-47.0)


 


Mean Corpuscular Volume    85 fL () 


 


Mean Corpuscular Hemoglobin    27 pg (25-35) 


 


Mean Corpuscular Hemoglobin


Concent 


 


 


 32 g/dL


(31-37)


 


Red Cell Distribution Width


 


 


 


 16.4 %


(11.5-14.5)


 


Platelet Count


 


 


 


 671 x10^3/uL


(140-400)


 


Neutrophils (%) (Auto)    80 % (31-73) 


 


Lymphocytes (%) (Auto)    12 % (24-48) 


 


Monocytes (%) (Auto)    7 % (0-9) 


 


Eosinophils (%) (Auto)    1 % (0-3) 


 


Basophils (%) (Auto)    0 % (0-3) 


 


Neutrophils # (Auto)


 


 


 


 9.2 x10^3/uL


(1.8-7.7)


 


Lymphocytes # (Auto)


 


 


 


 1.4 x10^3/uL


(1.0-4.8)


 


Monocytes # (Auto)


 


 


 


 0.8 x10^3/uL


(0.0-1.1)


 


Eosinophils # (Auto)


 


 


 


 0.1 x10^3/uL


(0.0-0.7)


 


Basophils # (Auto)


 


 


 


 0.0 x10^3/uL


(0.0-0.2)


 


Sodium Level


 


 


 


 134 mmol/L


(136-145)


 


Potassium Level


 


 


 


 3.7 mmol/L


(3.5-5.1)


 


Chloride Level


 


 


 


 102 mmol/L


()


 


Carbon Dioxide Level


 


 


 


 26 mmol/L


(21-32)


 


Anion Gap    6 (6-14) 


 


Blood Urea Nitrogen    9 mg/dL (7-20) 


 


Creatinine


 


 


 


 0.5 mg/dL


(0.6-1.0)


 


Estimated GFR


(Cockcroft-Gault) 


 


 


 131.1 





 


Glucose Level


 


 


 


 118 mg/dL


(70-99)


 


Calcium Level


 


 


 


 11.3 mg/dL


(8.5-10.1)


 


Test


 7/30/20


06:23 


 


 





 


Glucose (Fingerstick)


 115 mg/dL


(70-99) 


 


 














Assessment and Plan


Assessmemt and Plan


Problems


Medical Problems:


(1) Acute pancreatitis


Status: Acute  





(2) Cholelithiasis


Status: Acute  











Comment


Review of Relevant


I have reviewed the following items josy (where applicable) has been applied.





Justicifation of Admission Dx:


Justifications for Admission:


Justification of Admission Dx:  Yes











JACINTO MIRANDA MD                    Jul 30, 2020 12:32

## 2020-07-30 NOTE — RAD
Exam: Fluoroscopic and ultrasound guided right percutaneous inserted central

venous catheter placement 7/30/2020 1:09 PM



.Indication: venous access



Technique: Informed oral and written consent were obtained. The right upper

extremity was prepped and draped using sterile barrier technique. All elements

of maximal sterile barrier technique including the use of a cap, mask, sterile

gown, sterile gloves, large sterile sheet, appropriate hand hygiene, and 2%

chlorhexidine for cutaneous antisepsis (or acceptable alternative antiseptic

per current guidelines) were followed for this procedure..



Real-time ultrasound demonstrated a patent right basilic vein which was 

prepped and draped in usual sterile fashion. 1% lidocaine used for local

anesthesia. Using real-time ultrasound guidance the access needle

percutaneously punctured the selected right basilic vein. Reference ultrasound

images were saved to the medical record.



A guidewire was advanced through the needle to the cavoatrial junction, and a

peel-away sheath placed.   The catheter was cut to length and inserted through

the peel-away sheath such that its tip is at the cavoatrial junction. The wire

and sheath were removed, and the catheter secured in place, and a sterile

dressing was applied. Catheter was found to flush and aspirate normally. No

immediate complications are identified.



FLUORO TIME:  0.5 minutes

DOSE AREA PRODUCT:  0.5 Gycm2



 Impression: Ultrasound and fluoroscopically guided placement of a right upper

extremity PICC line.

## 2020-07-30 NOTE — NUR
SS following up with discharge planning. SS reviewed pt chart and discussed with pt RN. Pt 
is currently on room air. Pt on PPN, IV Avycaz, IV Micafungin, and IV Daptmycin. Pt has 
wound vac on midline incision. Pt has ROBERT drain x1 and Avi drain x1. Pt has G tube. Pt 
has ostomy bag on left ROBERT drain site due to drainage. Per RN, pt pulling off speaking valve. 
Med Assist continuing to try to work with pt's family for disability application. SS will 
continue to follow for discharge planning.

## 2020-07-30 NOTE — PDOC
G I PROGRESS NOTE


Subjective


Sleeping, not awakened.


Objective


Discussed with staff.  No new events.


Physical Exam


No formal PE.  Drains noted.


Review of Relevant


I have reviewed the following items josy (where applicable) has been applied.


Labs





Laboratory Tests








Test


 7/28/20


13:04 7/28/20


17:42 7/28/20


23:41 7/29/20


05:00


 


Glucose (Fingerstick)


 131 mg/dL


(70-99) 120 mg/dL


(70-99) 122 mg/dL


(70-99) 





 


White Blood Count


 


 


 


 12.4 x10^3/uL


(4.0-11.0)


 


Red Blood Count


 


 


 


 2.78 x10^6/uL


(3.50-5.40)


 


Hemoglobin


 


 


 


 7.5 g/dL


(12.0-15.5)


 


Hematocrit


 


 


 


 23.5 %


(36.0-47.0)


 


Mean Corpuscular Volume    84 fL () 


 


Mean Corpuscular Hemoglobin    27 pg (25-35) 


 


Mean Corpuscular Hemoglobin


Concent 


 


 


 32 g/dL


(31-37)


 


Red Cell Distribution Width


 


 


 


 16.0 %


(11.5-14.5)


 


Platelet Count


 


 


 


 626 x10^3/uL


(140-400)


 


Neutrophils (%) (Auto)    83 % (31-73) 


 


Lymphocytes (%) (Auto)    9 % (24-48) 


 


Monocytes (%) (Auto)    6 % (0-9) 


 


Eosinophils (%) (Auto)    2 % (0-3) 


 


Basophils (%) (Auto)    0 % (0-3) 


 


Neutrophils # (Auto)


 


 


 


 10.3 x10^3/uL


(1.8-7.7)


 


Lymphocytes # (Auto)


 


 


 


 1.1 x10^3/uL


(1.0-4.8)


 


Monocytes # (Auto)


 


 


 


 0.8 x10^3/uL


(0.0-1.1)


 


Eosinophils # (Auto)


 


 


 


 0.2 x10^3/uL


(0.0-0.7)


 


Basophils # (Auto)


 


 


 


 0.0 x10^3/uL


(0.0-0.2)


 


Sodium Level


 


 


 


 134 mmol/L


(136-145)


 


Potassium Level


 


 


 


 3.9 mmol/L


(3.5-5.1)


 


Chloride Level


 


 


 


 101 mmol/L


()


 


Carbon Dioxide Level


 


 


 


 26 mmol/L


(21-32)


 


Anion Gap    7 (6-14) 


 


Blood Urea Nitrogen


 


 


 


 10 mg/dL


(7-20)


 


Creatinine


 


 


 


 0.6 mg/dL


(0.6-1.0)


 


Estimated GFR


(Cockcroft-Gault) 


 


 


 106.3 





 


Glucose Level


 


 


 


 131 mg/dL


(70-99)


 


Calcium Level


 


 


 


 11.1 mg/dL


(8.5-10.1)


 


Test


 7/29/20


05:40 7/29/20


12:35 7/29/20


18:11 7/29/20


23:54


 


Glucose (Fingerstick)


 126 mg/dL


(70-99) 152 mg/dL


(70-99) 120 mg/dL


(70-99) 109 mg/dL


(70-99)


 


Test


 7/30/20


04:25 7/30/20


06:23 


 





 


White Blood Count


 11.5 x10^3/uL


(4.0-11.0) 


 


 





 


Red Blood Count


 2.77 x10^6/uL


(3.50-5.40) 


 


 





 


Hemoglobin


 7.6 g/dL


(12.0-15.5) 


 


 





 


Hematocrit


 23.6 %


(36.0-47.0) 


 


 





 


Mean Corpuscular Volume 85 fL ()    


 


Mean Corpuscular Hemoglobin 27 pg (25-35)    


 


Mean Corpuscular Hemoglobin


Concent 32 g/dL


(31-37) 


 


 





 


Red Cell Distribution Width


 16.4 %


(11.5-14.5) 


 


 





 


Platelet Count


 671 x10^3/uL


(140-400) 


 


 





 


Neutrophils (%) (Auto) 80 % (31-73)    


 


Lymphocytes (%) (Auto) 12 % (24-48)    


 


Monocytes (%) (Auto) 7 % (0-9)    


 


Eosinophils (%) (Auto) 1 % (0-3)    


 


Basophils (%) (Auto) 0 % (0-3)    


 


Neutrophils # (Auto)


 9.2 x10^3/uL


(1.8-7.7) 


 


 





 


Lymphocytes # (Auto)


 1.4 x10^3/uL


(1.0-4.8) 


 


 





 


Monocytes # (Auto)


 0.8 x10^3/uL


(0.0-1.1) 


 


 





 


Eosinophils # (Auto)


 0.1 x10^3/uL


(0.0-0.7) 


 


 





 


Basophils # (Auto)


 0.0 x10^3/uL


(0.0-0.2) 


 


 





 


Sodium Level


 134 mmol/L


(136-145) 


 


 





 


Potassium Level


 3.7 mmol/L


(3.5-5.1) 


 


 





 


Chloride Level


 102 mmol/L


() 


 


 





 


Carbon Dioxide Level


 26 mmol/L


(21-32) 


 


 





 


Anion Gap 6 (6-14)    


 


Blood Urea Nitrogen 9 mg/dL (7-20)    


 


Creatinine


 0.5 mg/dL


(0.6-1.0) 


 


 





 


Estimated GFR


(Cockcroft-Gault) 131.1 


 


 


 





 


Glucose Level


 118 mg/dL


(70-99) 


 


 





 


Calcium Level


 11.3 mg/dL


(8.5-10.1) 


 


 





 


Glucose (Fingerstick)


 


 115 mg/dL


(70-99) 


 











Laboratory Tests








Test


 7/29/20


12:35 7/29/20


18:11 7/29/20


23:54 7/30/20


04:25


 


Glucose (Fingerstick)


 152 mg/dL


(70-99) 120 mg/dL


(70-99) 109 mg/dL


(70-99) 





 


White Blood Count


 


 


 


 11.5 x10^3/uL


(4.0-11.0)


 


Red Blood Count


 


 


 


 2.77 x10^6/uL


(3.50-5.40)


 


Hemoglobin


 


 


 


 7.6 g/dL


(12.0-15.5)


 


Hematocrit


 


 


 


 23.6 %


(36.0-47.0)


 


Mean Corpuscular Volume    85 fL () 


 


Mean Corpuscular Hemoglobin    27 pg (25-35) 


 


Mean Corpuscular Hemoglobin


Concent 


 


 


 32 g/dL


(31-37)


 


Red Cell Distribution Width


 


 


 


 16.4 %


(11.5-14.5)


 


Platelet Count


 


 


 


 671 x10^3/uL


(140-400)


 


Neutrophils (%) (Auto)    80 % (31-73) 


 


Lymphocytes (%) (Auto)    12 % (24-48) 


 


Monocytes (%) (Auto)    7 % (0-9) 


 


Eosinophils (%) (Auto)    1 % (0-3) 


 


Basophils (%) (Auto)    0 % (0-3) 


 


Neutrophils # (Auto)


 


 


 


 9.2 x10^3/uL


(1.8-7.7)


 


Lymphocytes # (Auto)


 


 


 


 1.4 x10^3/uL


(1.0-4.8)


 


Monocytes # (Auto)


 


 


 


 0.8 x10^3/uL


(0.0-1.1)


 


Eosinophils # (Auto)


 


 


 


 0.1 x10^3/uL


(0.0-0.7)


 


Basophils # (Auto)


 


 


 


 0.0 x10^3/uL


(0.0-0.2)


 


Sodium Level


 


 


 


 134 mmol/L


(136-145)


 


Potassium Level


 


 


 


 3.7 mmol/L


(3.5-5.1)


 


Chloride Level


 


 


 


 102 mmol/L


()


 


Carbon Dioxide Level


 


 


 


 26 mmol/L


(21-32)


 


Anion Gap    6 (6-14) 


 


Blood Urea Nitrogen    9 mg/dL (7-20) 


 


Creatinine


 


 


 


 0.5 mg/dL


(0.6-1.0)


 


Estimated GFR


(Cockcroft-Gault) 


 


 


 131.1 





 


Glucose Level


 


 


 


 118 mg/dL


(70-99)


 


Calcium Level


 


 


 


 11.3 mg/dL


(8.5-10.1)


 


Test


 7/30/20


06:23 


 


 





 


Glucose (Fingerstick)


 115 mg/dL


(70-99) 


 


 











Microbiology


7/26/20 Gram Stain - Final, Complete


          


7/26/20 Aerobic Culture - Final, Complete


          


7/26/20 Blood Culture - Preliminary, Resulted


          NO GROWTH AFTER 4 DAYS


7/26/20 Urine Culture - Preliminary, Resulted


          


7/21/20 Gram Stain - Final, Complete


          


7/21/20 Aerobic and Anaerobic Culture - Final, Complete


          


7/19/20 Gram Stain Evaluation - Final, Complete


          


7/19/20 Respiratory Culture - Final, Complete


          


7/19/20 Antimicrobic Susceptibility - Final, Complete


          


6/30/20 Gram Stain - Final, Complete


          


6/30/20 Aerobic and Anaerobic Culture - Final, Complete


          


6/30/20 Antimicrobic Susceptibility - Final, Complete


Vitals/I & O





Vital Sign - Last 24 Hours








 7/29/20 7/29/20 7/29/20 7/29/20





 12:41 13:00 15:20 15:52


 


Temp   99.3 





   99.3 


 


Pulse   136 


 


B/P (MAP)   145/87 (106) 


 


Pulse Ox 97 96 97 99


 


O2 Delivery Nasal Cannula Nasal Cannula Nasal Cannula Nasal Cannula


 


O2 Flow Rate 2.0 2.0 2.0 2.0


 


    





    





 7/29/20 7/29/20 7/29/20 7/29/20





 19:20 19:56 20:01 20:07


 


Temp 99.0  99.0 





 99.0  99.0 


 


Pulse 124  119 


 


Resp 28  32 


 


B/P (MAP) 161/73 (102)  167/90 (115) 


 


Pulse Ox 94 96 100 


 


O2 Delivery Nasal Cannula Nasal Cannula Tracheal Collar Nasal Cannula


 


O2 Flow Rate 2.0 2.0  2.0


 


    





    





 7/29/20 7/29/20 7/29/20 7/29/20





 22:44 23:14 23:30 23:32


 


Temp   98.7 





   98.7 


 


Pulse   118 


 


Resp 28 28 23 


 


B/P (MAP)   131/68 (89) 


 


Pulse Ox   94 95


 


O2 Delivery Room Air Nasal Cannula Room Air Nasal Cannula


 


O2 Flow Rate    2.0


 


    





    





 7/30/20 7/30/20 7/30/20 7/30/20





 03:16 03:25 07:00 07:45


 


Temp 97.8  98.4 





 97.8  98.4 


 


Pulse 116  110 


 


Resp 24  23 


 


B/P (MAP) 150/83 (105)  157/80 (105) 


 


Pulse Ox 94 95 95 


 


O2 Delivery Room Air Nasal Cannula Room Air Room Air


 


O2 Flow Rate  2.0  


 


    





    





 7/30/20 7/30/20 7/30/20 7/30/20





 08:39 09:08 09:38 11:46


 


Resp  28 28 


 


Pulse Ox 95 95 93 96


 


O2 Delivery Nasal Cannula Room Air Room Air Nasal Cannula


 


O2 Flow Rate 2.0   2.0














Intake and Output   


 


 7/29/20 7/29/20 7/30/20





 15:00 23:00 07:00


 


Intake Total 200 ml 300 ml 1310 ml


 


Output Total 300 ml 825 ml 760 ml


 


Balance -100 ml -525 ml 550 ml











300+ out ROBERT in GB fossa.


Problem List


Problems


Medical Problems:


(1) Acute pancreatitis


Status: Acute  





(2) Cholelithiasis


Status: Acute  





Assessment


Remains critically ill.  Concern for ongoing bilious drainage from GB bed.


Plan of Care Note


Continue support.





Justicifation of Admission Dx:


Justifications for Admission:


Justification of Admission Dx:  Yes











MARCO ANTONIO LONGO MD          Jul 30, 2020 12:07

## 2020-07-30 NOTE — PDOC
PULMONARY PROGRESS NOTES


Subjective


Resting comfortable on trach W/ room air 


no complaints


Vitals





Vital Signs








  Date Time  Temp Pulse Resp B/P (MAP) Pulse Ox O2 Delivery O2 Flow Rate FiO2


 


7/30/20 09:08   28  95 Room Air  


 


7/30/20 08:39       2.0 


 


7/30/20 07:00 98.4 110  157/80 (105)    





 98.4       








ROS:  No Nausea, No Chest Pain, No Abdominal Pain, No Increase Cough


General:  Alert


HEENT:  Other (trach site CDI)


Lungs:  Crackles


Cardiovascular:  S1, S2


Abdomen:  Soft, Non-tender, Other (multiple ROBERT drains )


Neuro Exam:  Alert


Extremities:  Other (+1 BLE edema)


Skin:  Warm


Labs





Laboratory Tests








Test


 7/28/20


13:04 7/28/20


17:42 7/28/20


23:41 7/29/20


05:00


 


Glucose (Fingerstick)


 131 mg/dL


(70-99) 120 mg/dL


(70-99) 122 mg/dL


(70-99) 





 


White Blood Count


 


 


 


 12.4 x10^3/uL


(4.0-11.0)


 


Red Blood Count


 


 


 


 2.78 x10^6/uL


(3.50-5.40)


 


Hemoglobin


 


 


 


 7.5 g/dL


(12.0-15.5)


 


Hematocrit


 


 


 


 23.5 %


(36.0-47.0)


 


Mean Corpuscular Volume    84 fL () 


 


Mean Corpuscular Hemoglobin    27 pg (25-35) 


 


Mean Corpuscular Hemoglobin


Concent 


 


 


 32 g/dL


(31-37)


 


Red Cell Distribution Width


 


 


 


 16.0 %


(11.5-14.5)


 


Platelet Count


 


 


 


 626 x10^3/uL


(140-400)


 


Neutrophils (%) (Auto)    83 % (31-73) 


 


Lymphocytes (%) (Auto)    9 % (24-48) 


 


Monocytes (%) (Auto)    6 % (0-9) 


 


Eosinophils (%) (Auto)    2 % (0-3) 


 


Basophils (%) (Auto)    0 % (0-3) 


 


Neutrophils # (Auto)


 


 


 


 10.3 x10^3/uL


(1.8-7.7)


 


Lymphocytes # (Auto)


 


 


 


 1.1 x10^3/uL


(1.0-4.8)


 


Monocytes # (Auto)


 


 


 


 0.8 x10^3/uL


(0.0-1.1)


 


Eosinophils # (Auto)


 


 


 


 0.2 x10^3/uL


(0.0-0.7)


 


Basophils # (Auto)


 


 


 


 0.0 x10^3/uL


(0.0-0.2)


 


Sodium Level


 


 


 


 134 mmol/L


(136-145)


 


Potassium Level


 


 


 


 3.9 mmol/L


(3.5-5.1)


 


Chloride Level


 


 


 


 101 mmol/L


()


 


Carbon Dioxide Level


 


 


 


 26 mmol/L


(21-32)


 


Anion Gap    7 (6-14) 


 


Blood Urea Nitrogen


 


 


 


 10 mg/dL


(7-20)


 


Creatinine


 


 


 


 0.6 mg/dL


(0.6-1.0)


 


Estimated GFR


(Cockcroft-Gault) 


 


 


 106.3 





 


Glucose Level


 


 


 


 131 mg/dL


(70-99)


 


Calcium Level


 


 


 


 11.1 mg/dL


(8.5-10.1)


 


Test


 7/29/20


05:40 7/29/20


12:35 7/29/20


18:11 7/29/20


23:54


 


Glucose (Fingerstick)


 126 mg/dL


(70-99) 152 mg/dL


(70-99) 120 mg/dL


(70-99) 109 mg/dL


(70-99)


 


Test


 7/30/20


04:25 7/30/20


06:23 


 





 


White Blood Count


 11.5 x10^3/uL


(4.0-11.0) 


 


 





 


Red Blood Count


 2.77 x10^6/uL


(3.50-5.40) 


 


 





 


Hemoglobin


 7.6 g/dL


(12.0-15.5) 


 


 





 


Hematocrit


 23.6 %


(36.0-47.0) 


 


 





 


Mean Corpuscular Volume 85 fL ()    


 


Mean Corpuscular Hemoglobin 27 pg (25-35)    


 


Mean Corpuscular Hemoglobin


Concent 32 g/dL


(31-37) 


 


 





 


Red Cell Distribution Width


 16.4 %


(11.5-14.5) 


 


 





 


Platelet Count


 671 x10^3/uL


(140-400) 


 


 





 


Neutrophils (%) (Auto) 80 % (31-73)    


 


Lymphocytes (%) (Auto) 12 % (24-48)    


 


Monocytes (%) (Auto) 7 % (0-9)    


 


Eosinophils (%) (Auto) 1 % (0-3)    


 


Basophils (%) (Auto) 0 % (0-3)    


 


Neutrophils # (Auto)


 9.2 x10^3/uL


(1.8-7.7) 


 


 





 


Lymphocytes # (Auto)


 1.4 x10^3/uL


(1.0-4.8) 


 


 





 


Monocytes # (Auto)


 0.8 x10^3/uL


(0.0-1.1) 


 


 





 


Eosinophils # (Auto)


 0.1 x10^3/uL


(0.0-0.7) 


 


 





 


Basophils # (Auto)


 0.0 x10^3/uL


(0.0-0.2) 


 


 





 


Sodium Level


 134 mmol/L


(136-145) 


 


 





 


Potassium Level


 3.7 mmol/L


(3.5-5.1) 


 


 





 


Chloride Level


 102 mmol/L


() 


 


 





 


Carbon Dioxide Level


 26 mmol/L


(21-32) 


 


 





 


Anion Gap 6 (6-14)    


 


Blood Urea Nitrogen 9 mg/dL (7-20)    


 


Creatinine


 0.5 mg/dL


(0.6-1.0) 


 


 





 


Estimated GFR


(Cockcroft-Gault) 131.1 


 


 


 





 


Glucose Level


 118 mg/dL


(70-99) 


 


 





 


Calcium Level


 11.3 mg/dL


(8.5-10.1) 


 


 





 


Glucose (Fingerstick)


 


 115 mg/dL


(70-99) 


 











Laboratory Tests








Test


 7/29/20


12:35 7/29/20


18:11 7/29/20


23:54 7/30/20


04:25


 


Glucose (Fingerstick)


 152 mg/dL


(70-99) 120 mg/dL


(70-99) 109 mg/dL


(70-99) 





 


White Blood Count


 


 


 


 11.5 x10^3/uL


(4.0-11.0)


 


Red Blood Count


 


 


 


 2.77 x10^6/uL


(3.50-5.40)


 


Hemoglobin


 


 


 


 7.6 g/dL


(12.0-15.5)


 


Hematocrit


 


 


 


 23.6 %


(36.0-47.0)


 


Mean Corpuscular Volume    85 fL () 


 


Mean Corpuscular Hemoglobin    27 pg (25-35) 


 


Mean Corpuscular Hemoglobin


Concent 


 


 


 32 g/dL


(31-37)


 


Red Cell Distribution Width


 


 


 


 16.4 %


(11.5-14.5)


 


Platelet Count


 


 


 


 671 x10^3/uL


(140-400)


 


Neutrophils (%) (Auto)    80 % (31-73) 


 


Lymphocytes (%) (Auto)    12 % (24-48) 


 


Monocytes (%) (Auto)    7 % (0-9) 


 


Eosinophils (%) (Auto)    1 % (0-3) 


 


Basophils (%) (Auto)    0 % (0-3) 


 


Neutrophils # (Auto)


 


 


 


 9.2 x10^3/uL


(1.8-7.7)


 


Lymphocytes # (Auto)


 


 


 


 1.4 x10^3/uL


(1.0-4.8)


 


Monocytes # (Auto)


 


 


 


 0.8 x10^3/uL


(0.0-1.1)


 


Eosinophils # (Auto)


 


 


 


 0.1 x10^3/uL


(0.0-0.7)


 


Basophils # (Auto)


 


 


 


 0.0 x10^3/uL


(0.0-0.2)


 


Sodium Level


 


 


 


 134 mmol/L


(136-145)


 


Potassium Level


 


 


 


 3.7 mmol/L


(3.5-5.1)


 


Chloride Level


 


 


 


 102 mmol/L


()


 


Carbon Dioxide Level


 


 


 


 26 mmol/L


(21-32)


 


Anion Gap    6 (6-14) 


 


Blood Urea Nitrogen    9 mg/dL (7-20) 


 


Creatinine


 


 


 


 0.5 mg/dL


(0.6-1.0)


 


Estimated GFR


(Cockcroft-Gault) 


 


 


 131.1 





 


Glucose Level


 


 


 


 118 mg/dL


(70-99)


 


Calcium Level


 


 


 


 11.3 mg/dL


(8.5-10.1)


 


Test


 7/30/20


06:23 


 


 





 


Glucose (Fingerstick)


 115 mg/dL


(70-99) 


 


 











Medications





Active Scripts








 Medications  Dose


 Route/Sig


 Max Daily Dose Days Date Category


 


 Bisoprolol


 Fumarate 5 Mg


 Tablet  10 Mg


 PO DAILY


   3/16/20 Reported








Comments








ct reviewed 7/12/20, Decreased left-sided effusion after catheter placement. The




right-sided effusion has increased as has atelectasis.


 





 


There has been exchange or placement of multiple drainage 


tubes and a gastrojejunostomy tube. Both collections are smaller. No 


significant new abdominal fluid collection is seen. The jejunal component 


of the gastrojejunostomy tube appears to be looped in the proximal small 


bowel. 











ct abdomen /pelvis 6/6


1. Removal of the percutaneous pigtail drainage catheters since the prior 


exam. Sequela of pancreatitis with extensive pseudocysts again 


demonstrated, the right-sided collections are slightly larger since the 


prior exam, the left-sided collections are stable. See above.


2. Moderate to large left pleural effusion with atelectasis and collapse 


of most of the left lower lobe, stable. Small right pleural effusion is 


stable.


3. Gallstone.





ct chest 6/15 reviewed








 GRAM NEG COCCOBACILLI:MANY


        SQUAMOUS EPI CELL:RARE


        PMN (WBCs):FEW


        Unless otherwise specified, Testing Performed by:


        [address] [address]


        For Inquires, the Physician may contact the Microbiology


        department at [phone]





  RESPIRATORY CULTURE  Final  


        Final





        MANY GRAM NEGATIVE RODS on 06/15/20 at 1107


        FINAL ID= [PSEUDOMONAS AERUGINOSA]


        MICRO CHARGES


        PSEUDOMONAS AERUGINOSA





  ANTIMICROBIAL SUSCEPTIBILITY  Final  


        Comment





        NEG ANSON 56


        PSEUDOMONAS AERUGINOSA


        ANTIBIOTIC                        RESULT          INTERPRETATION


        AMIKACIN                          <=16                  S


        AZTREONAM                         <=4                   S


        CEFTAZIDIME                       <=1                   S


        CIPROFLOXACIN                     <=0.25                S


        CEFEPIME                          <=2                   S


        CEFTAZIDIME/AVIBACTAM             <=4                   S


        GENTAMICIN                        <=2                   S


        LEVOFLOXACIN                      <=0.5                 S





Impression


.


IMPRESSION:


1.  Acute hypoxemic respiratory failure secondary to ARDS status post trach, 

developed anemia 6/7, blood drainage from RLQ abdomen drain site, and 

surrounding firmness  / developed septic shock 6/7 from abdomen source, required

levo 6/7


s/p 3 new drains 6/7 with brown color drainage, --- off pressors


S/P Exp. 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 

parapsilosis & PSAE (I-merrem/ceftazidime/AZT/cefepime))


Leucocytosis -improved


Fever---resolved


Acute gallstone pancreatitis with persistent necrosis


  - 4/9.  CT A/P Increased ascites. Persistent evidence of necrotizing 

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


  - 4/27. status post ROBERT drain placement; C. parapsilosis. s/p drain 5/6 + yeast

& high amylase; s/p additional drain on 5/8. Drains removed. 


  -5/6. fluid  candida parapsilosis fluid, amylase high


  - 6/6 showed multiple pseudocysts, slight larger on the right. s/p drains x 3,

6/7.  + PSAE (MDRO-R Cefepime, Zosyn ANSON < 64) and yeast, 


  -6/7 s/p drain replacement x 3; fluid cult PSAE (MDRO), yeast; treated


  -7/12 CT A/P shows smaller fluid collections.  


  -722 CT abdomen and pelvis drains in place       


Ascites s/p paracentesis 4/15 & 5/6. C. parapsilosis 


Cholelithiasis with thickening of the gallbladder wall.


JED, Hyperkalemia, Metabolic acidosis off dialysis


Acute hypoxic resp failure. trach/vent. sputum 6/13  + PSAE (I merrem) ; sputum 

culture July 19+ for PSAE R Merrem, sensitive to cefepime


Pleural effusion status post CTS left side


 Abdominal fluid culture 6 /7 MDRO Pseudomonas, yeast


Sputum culture positive 7/19 for MDRO Pseudomonas


Chest tube fluid positive for 7/21 Candida Parapsilosis


S/P Exploratory laparotomy, lysis of adhesions, subtotal cholecystectomy with 

cholangiogram, gastrojejunostomy tube placement, pancreatic necrosectomy





7/23 fluid from the chest tube


  GRAM STAIN  Final  


        Final





        NO ORGANISMS SEEN.


        SQUAMOUS EPI CELL:NOT APPLICABLE


        PMN (WBCs):RARE


        Unless otherwise specified, Testing Performed by:


        [address] [address]


        For Inquires, the Physician may contact the Microbiology


        department at [phone]





  ANAEROBIC-AEROBIC CULTURE  Preliminary  


        Preliminary





        No Growth on 07/22/20 at 0957


        Unless otherwise specified, Testing Performed by:


        [address] [address]


        For Inquires, the Physician may contact the Microbiology


        department at [phone]











Antibiotics per ID, since 7/23


Avycaz,micafungin and dapto





Plan


.


Stable from pulmonary stand point 


chest tube REMOVED 7/24


Transfuse as needed, monitor HGB


Antibiotics per ID 


Trach shield, as tolerated, on room air-- attempt PMV/Capped


Up to chair, PT/OT


Follow surgery input


DVT GI prophylaxis


f/u BC /resp cultures 





DVT/GI PPX








stable pulmonary wise. will see PRN














SHARYN SOLORZANO MD                 Jul 30, 2020 09:40 Statement Selected

## 2020-07-31 VITALS — SYSTOLIC BLOOD PRESSURE: 132 MMHG | DIASTOLIC BLOOD PRESSURE: 78 MMHG

## 2020-07-31 VITALS — SYSTOLIC BLOOD PRESSURE: 137 MMHG | DIASTOLIC BLOOD PRESSURE: 76 MMHG

## 2020-07-31 VITALS — DIASTOLIC BLOOD PRESSURE: 80 MMHG | SYSTOLIC BLOOD PRESSURE: 127 MMHG

## 2020-07-31 VITALS — DIASTOLIC BLOOD PRESSURE: 85 MMHG | SYSTOLIC BLOOD PRESSURE: 137 MMHG

## 2020-07-31 VITALS — DIASTOLIC BLOOD PRESSURE: 76 MMHG | SYSTOLIC BLOOD PRESSURE: 145 MMHG

## 2020-07-31 VITALS — SYSTOLIC BLOOD PRESSURE: 135 MMHG | DIASTOLIC BLOOD PRESSURE: 89 MMHG

## 2020-07-31 RX ADMIN — FENTANYL SCH PATCH: 12.5 PATCH TRANSDERMAL at 08:40

## 2020-07-31 RX ADMIN — CEFTAZIDIME, AVIBACTAM SCH MLS/HR: 2; .5 POWDER, FOR SOLUTION INTRAVENOUS at 14:17

## 2020-07-31 RX ADMIN — IPRATROPIUM BROMIDE AND ALBUTEROL SULFATE SCH ML: .5; 3 SOLUTION RESPIRATORY (INHALATION) at 00:37

## 2020-07-31 RX ADMIN — ONDANSETRON PRN MG: 2 INJECTION INTRAMUSCULAR; INTRAVENOUS at 21:59

## 2020-07-31 RX ADMIN — BACITRACIN SCH MLS/HR: 5000 INJECTION, POWDER, FOR SOLUTION INTRAMUSCULAR at 14:33

## 2020-07-31 RX ADMIN — CEFTAZIDIME, AVIBACTAM SCH MLS/HR: 2; .5 POWDER, FOR SOLUTION INTRAVENOUS at 21:59

## 2020-07-31 RX ADMIN — DAPTOMYCIN SCH MLS/HR: 500 INJECTION, POWDER, LYOPHILIZED, FOR SOLUTION INTRAVENOUS at 09:53

## 2020-07-31 RX ADMIN — IPRATROPIUM BROMIDE AND ALBUTEROL SULFATE SCH ML: .5; 3 SOLUTION RESPIRATORY (INHALATION) at 03:45

## 2020-07-31 RX ADMIN — ENOXAPARIN SODIUM SCH MG: 40 INJECTION SUBCUTANEOUS at 08:38

## 2020-07-31 RX ADMIN — CEFTAZIDIME, AVIBACTAM SCH MLS/HR: 2; .5 POWDER, FOR SOLUTION INTRAVENOUS at 05:02

## 2020-07-31 RX ADMIN — DEXTROSE SCH MLS/HR: 50 INJECTION, SOLUTION INTRAVENOUS at 08:38

## 2020-07-31 RX ADMIN — METOPROLOL TARTRATE PRN MG: 5 INJECTION INTRAVENOUS at 11:57

## 2020-07-31 RX ADMIN — IPRATROPIUM BROMIDE AND ALBUTEROL SULFATE SCH ML: .5; 3 SOLUTION RESPIRATORY (INHALATION) at 15:49

## 2020-07-31 RX ADMIN — IPRATROPIUM BROMIDE AND ALBUTEROL SULFATE SCH ML: .5; 3 SOLUTION RESPIRATORY (INHALATION) at 22:55

## 2020-07-31 RX ADMIN — FENTANYL CITRATE PRN MCG: 50 INJECTION INTRAMUSCULAR; INTRAVENOUS at 14:11

## 2020-07-31 RX ADMIN — IPRATROPIUM BROMIDE AND ALBUTEROL SULFATE SCH ML: .5; 3 SOLUTION RESPIRATORY (INHALATION) at 08:27

## 2020-07-31 RX ADMIN — FENTANYL CITRATE PRN MCG: 50 INJECTION INTRAMUSCULAR; INTRAVENOUS at 21:04

## 2020-07-31 RX ADMIN — INSULIN LISPRO SCH UNITS: 100 INJECTION, SOLUTION INTRAVENOUS; SUBCUTANEOUS at 17:38

## 2020-07-31 RX ADMIN — PANTOPRAZOLE SODIUM SCH MG: 40 INJECTION, POWDER, FOR SOLUTION INTRAVENOUS at 08:39

## 2020-07-31 RX ADMIN — ACETYLCYSTEINE SCH MG: 200 INHALANT RESPIRATORY (INHALATION) at 08:27

## 2020-07-31 RX ADMIN — IPRATROPIUM BROMIDE AND ALBUTEROL SULFATE SCH ML: .5; 3 SOLUTION RESPIRATORY (INHALATION) at 12:39

## 2020-07-31 RX ADMIN — GLYCERIN, ISOLEUCINE, LEUCINE, LYSINE, METHIONINE, PHENYLALANINE, THREONINE, TRYPTOPHAN, VALINE, ALANINE, GLYCINE, ARGININE, HISTIDINE, PROLINE, SERINE, CYSTEINE, SODIUM ACETATE, MAGNESIUM ACETATE, CALCIUM ACETATE, SODIUM CHLORIDE, POTASSIUM CHLORIDE, PHOSPHORIC ACID, AND POTASSIUM METABISULFITE SCH MLS/HR
3; .21; .27; .22; .16; .17; .12; .046; .2; .21; .42; .29; .085; .34; .18; .014; .2; .054; .026; .12; .15; .041 INJECTION INTRAVENOUS at 17:33

## 2020-07-31 RX ADMIN — ACETYLCYSTEINE SCH MG: 200 INHALANT RESPIRATORY (INHALATION) at 20:00

## 2020-07-31 RX ADMIN — INSULIN LISPRO SCH UNITS: 100 INJECTION, SOLUTION INTRAVENOUS; SUBCUTANEOUS at 23:40

## 2020-07-31 RX ADMIN — GLYCERIN, ISOLEUCINE, LEUCINE, LYSINE, METHIONINE, PHENYLALANINE, THREONINE, TRYPTOPHAN, VALINE, ALANINE, GLYCINE, ARGININE, HISTIDINE, PROLINE, SERINE, CYSTEINE, SODIUM ACETATE, MAGNESIUM ACETATE, CALCIUM ACETATE, SODIUM CHLORIDE, POTASSIUM CHLORIDE, PHOSPHORIC ACID, AND POTASSIUM METABISULFITE SCH MLS/HR
3; .21; .27; .22; .16; .17; .12; .046; .2; .21; .42; .29; .085; .34; .18; .014; .2; .054; .026; .12; .15; .041 INJECTION INTRAVENOUS at 02:45

## 2020-07-31 RX ADMIN — IPRATROPIUM BROMIDE AND ALBUTEROL SULFATE SCH ML: .5; 3 SOLUTION RESPIRATORY (INHALATION) at 20:25

## 2020-07-31 RX ADMIN — FENTANYL CITRATE PRN MCG: 50 INJECTION INTRAMUSCULAR; INTRAVENOUS at 17:24

## 2020-07-31 RX ADMIN — INSULIN LISPRO SCH UNITS: 100 INJECTION, SOLUTION INTRAVENOUS; SUBCUTANEOUS at 12:15

## 2020-07-31 RX ADMIN — INSULIN LISPRO SCH UNITS: 100 INJECTION, SOLUTION INTRAVENOUS; SUBCUTANEOUS at 05:02

## 2020-07-31 NOTE — PDOC
Infectious Disease Note


Subjective


Subjective





States ok.  Mild nausea No SOAF/S/C. pain ok





ROS


ROS


o/w neg





Vital Sign


Vital Signs





Vital Signs








  Date Time  Temp Pulse Resp B/P (MAP) Pulse Ox O2 Delivery O2 Flow Rate FiO2


 


7/31/20 03:45      Room Air  


 


7/31/20 03:00 97.1 112 22 127/80 (96) 96   





 97.1       


 


7/30/20 21:20       2.0 











Physical Exam


PHYSICAL EXAM


GENERAL: pt in bed, appears weak -comfortable -alert


HEENT: Pupils equal, oral cavity dry. OC/Op - Dry


NECK:  Tracheostomy - no JVD


LUNGS: Diminished aeration bases,  CT on left 


HEART:  S1, S2, 


ABDOMEN: Distended mild- bowel sounds hypoactive, soft, richardson x 2, ROBERT drains, 

G-J tube. Some drainage around R quad tube and mild insertion site irritation


: [place] in place 


EXTREMITIES: Trace generalized edema, no cyanosis. Yeast in groin area


SKIN: warm touch. No signs of rash.  


LUE- Previous PICC site without signs of complications. PIV s clean


NEURO: - Alert and responsive


PICC RUE - clean





Labs


Lab





Laboratory Tests








Test


 7/30/20


17:49 7/30/20


23:00 7/31/20


05:01


 


Glucose (Fingerstick)


 137 mg/dL


(70-99) 117 mg/dL


(70-99) 146 mg/dL


(70-99)








Micro


6/7 





GRAM STAIN  Final  


        Final





        GRAM NEGATIVE RODS:MODERATE


        SQUAMOUS EPI CELL:NOT APPLICABLE


        PMN (WBCs):RARE


        YEAST:MODERATE


        Unless otherwise specified, Testing Performed by:


        [address] [address]


        For Inquires, the Physician may contact the Microbiology


        department at [phone]





  ANAEROBIC-AEROBIC CULTURE  Preliminary  


        Preliminary





        MANY GRAM NEGATIVE RODS on 06/09/20 at 1152


        FINAL ID= [PSEUDOMONAS AERUGINOSA]


        PSEUDOMONAS AERUGINOSA





  ANTIMICROBIAL SUSCEPTIBILITY  Preliminary  


        Comment





        NEG ANSON 56


        PSEUDOMONAS AERUGINOSA


        ANTIBIOTIC                        RESULT          INTERPRETATION


        AMIKACIN                          <=16                  S


        AZTREONAM                         >16                   R


        CEFTAZIDIME                       >16                   R


        CIPROFLOXACIN                     <=0.25                S


        CEFEPIME                          16                    I


        CEFTAZIDIME/AVIBACTAM             <=4                   S


        GENTAMICIN                        <=2                   S














                               ** CONTINUED ON NEXT PAGE **





 

--------------------------------------------------------------------------------


------------





RUN DATE: 06/11/20                  [facility] *LIVE*               

  PAGE 2   


RUN TIME: 1016                            Specimen Inquiry                    


 

--------------------------------------------------------------------------------


------------





SPEC: [id number]    PATIENT: GENEVAJESENIA K                [id number]  

(Continued)


----------------------------------------------------------

----------------------------------








 

--------------------------------------------------------------------------------


------------





  Procedure                         Result                                      

         


----------------------------------

----------------------------------------------------------





  ANTIMICROBIAL SUSCEPTIBILITY  Preliminary   (continued)


        LEVOFLOXACIN                      <=0.5                 S


        MEROPENEM                         <=1                   S


        PIPERACILLIN/TAZOBACTAM           64                    S


        TOBRAMYCIN                        <=2                   S


        Unless otherwise specified, Testing Performed by:


        [address] [address]


        For Inquires, the Physician may contact the Microbiology


        department at [phone]











CT Scan 6/6





IMPRESSION:


1. Removal of the percutaneous pigtail drainage catheters since the prior 


exam. Sequela of pancreatitis with extensive pseudocysts again 


demonstrated, the right-sided collections are slightly larger since the 


prior exam, the left-sided collections are stable. See above.


2. Moderate to large left pleural effusion with atelectasis and collapse 


of most of the left lower lobe, stable. Small right pleural effusion is 


stable.


3. Gallstone.





Objective


Assessment





Patient with prolonged hospitalization more than 4 months


Multiple medical problems


Multiple surgical procedures





URINE with parapsilosis


S/P Exp. 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 pa

rapsilosis & PSAE (I-merrem/ceftazidime/AZT/cefepime))


Leukocytosis - better ? reactive as clinically looks well


Fever - 7/25 c-diff neg


Anemia


Acute gallstone pancreatitis with persistent necrosis


  - 4/9.  CT A/P Increased ascites. Persistent evidence of necrotizing 

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


  - 4/27. status post ROBERT drain placement; C. parapsilosis. s/p drain 5/6 + yeast

& high amylase; s/p additional drain on 5/8. Drains removed. 


  -5/6. fluid  candida parapsilosis fluid, amylase high


  - 6/6 showed multiple pseudocysts, slight larger on the right. s/p drains x 3,

6/7.  + PSAE (MDRO-R Cefepime, Zosyn ANSON < 64) and yeast, 


  -6/7 s/p drain replacement x 3; fluid cult PSAE (MDRO), yeast; treated


  -7/12 CT A/P shows smaller fluid collections.  


  -722 CT abdomen and pelvis drains in place       


Ascites s/p paracentesis 4/15 & 5/6. C. parapsilosis 


Cholelithiasis with thickening of the gallbladder wall.


JED, Hyperkalemia, Metabolic acidosis off dialysis


Acute hypoxic resp failure. trach/vent. sputum 6/13  + PSAE (I merrem) ; sputum 

culture July 19+ for PSAE R Merrem, sensitive to cefepime


Pleural effusion status post CTS left side


 Abdominal fluid culture 6 /7 MDRO Pseudomonas, yeast


Sputum culture positive 7/19 for MDRO Pseudomonas


Chest tube fluid positive for 7/21 Candida Parapsilosis





Plan


Plan of Care





[place]ino changed 7/27


Continue Avycaz 7/23 /micafungin 6/30


Restart daptomycin 7/26(CK <7 7/28) wean soon


CBC/BMP in am


Nystatin to groin


UA and urine culture Blood culture/Cath tip neg - neg


C. difficile negative


Monitor WBC/temp 


Wound care /drain management as directed


Contact isolation for CRE/MDRO








Critically ill





Long term prognosis  poor





D/w nursing











RASHAWN ROSEN MD              Jul 31, 2020 07:28

## 2020-07-31 NOTE — PDOC
[employer] PROGRESS NOTE


Chief Complaint


Chief Complaint


S/P Exp. 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 

parapsilosis & PSAE (I-merrem/ceftazidime/AZT/cefepime))


Leucocytosis -trending upward 


Fever 


Acute gallstone pancreatitis with persistent necrosis


Postop (Exploratory laparotomy, lysis of adhesions, subtotal cholecystectomy 

with cholangiogram, gastrojejunostomy tube placement, pancreatic necrosectomy)


Acute hypoxic Respiratory failure required  mechanical ventilation


Tracheostomy


bilateral pleural effusions/pulm edema s/p Throacentesis on 6/15/2020


Severe Acute gallstone pancreatitis (not a surgical candidate at this time) with

necrosis


Acute kidney failure now requiring dialysis


Gallstones (Calculus of gallbladder with acute cholecystitis without obstru

ction)


HTN 


Intractable pain


Intractable nausea


Covid 19 negative. 


Acute on chronic anemia 


EEG: No seizure activity


Fever  - intermittent 


? Ileus with vomiting


Abd distention - U/S and CT reviewed s/p 0.4 L of opaque, debris-containing 

ascites was removed 5/6


Acute pancreatitis with persistent necrosis


Gallstone pancreatitis with necrosis. 


   -CT A/P 6/6 showed multiple pseudocysts, slight larger on the right. s/p 

drains x 3, 6/7.  + PSAE (MDRO-R Cefepime, Zosyn ANSON < 64) and yeast, 


   -s/p drain 4/27. C. parapsilosis. s/p drain 5/6 + yeast & high amylase; s/p 

additional drain on 5/8. Drains removed. 


Ascites s/p paracentesis 4/15 & 5/6. C. parapsilosis 


JED. off HD. 


A large fluid collection in the pancreatic bed has slightly decreased in size, 

described below, the pancreas itself is difficult to  visualize, which could be 

due to necrosis or obscuration of pancreatic  parenchyma from the surrounding 

fluid collection.6/15 


- 4/27 status post ROBERT drain placement + C paropsilosis. s/p additional drains 

5/8


Anemia - S/p PRBCs. 


Cholelithiasis with thickening of the gallbladder wall.


Leucocytosis improving


JED, hyperkalemia, Metabolic acidosis off dialysis


hypocalcemia 


Prediabetes


HTN


s/p trach


Hyperglycemia


severe protein-caloric malnutrition


Moderate to large left pleural effusion with atelectasis and collapse  of most 

of the left lower lobe, stable


Extensive retroperitoneal fluid collections persist. Percutaneous  drains remain

within the collections in both paracolic gutters. These  communicate with additi

onal pelvic and peripancreatic collections.











PLAN================











Continue Avycaz /micafungin, DC dapto per ID recommendations


Negative C diff


BC from 7/15 neg to date 


7/26 PICC line removed will start PPN for 24 hours then replace PICC line other 

side


Follow surgery input regarding diet and drains, pending GJ tube placement with 

IR before restarting tube feeds.


DVT GI prophylaxis


f/u BC /resp cultures


continue DVT/GI PPX





Discussed with RN

















35 MIN CC TIME





History of Present Illness


History of Present Illness


7/31/2020


Patient seen and examined at bedside.  No acute events overnight.  Positive 

fluid balance 633.  Patient's chart, labs, images were reviewed and discussed 

with RN








7/30/2020


Patient seen and examined bedside.  No acute events overnight.  Patient's chart,

labs, images were reviewed and discussed with RN








7/28/2020


Patient seen and examined bedside.  Patient appears to be in no obvious pain.  

All drains have been adequately draining.  Patient continues to be on TPN.  

Chart labs and imaging was reviewed.  Negative fluid balance of 270 cc


7/27/2020


Patient seen and examined in the ICU.  Patient still requires extensive care.  

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  Chart, labs, imaging was 

reviewed.  UOP with + 500cc balance.








7/25 /2020





Patient seen in and examined in the ICU


She is still extremely critically ill


Appears weak, frail, and pale


Better color today with improved eye contact and expression


Discussed with RN


Chart reviewed











 








7/21/2020


Patient seen and examined in the ICU


She is still extremely critically ill


Appears extremely weak frail and pale


Discussed with RN


Chart reviewed





Vitals/I&O


Vitals/I&O:





                                   Vital Signs








  Date Time  Temp Pulse Resp B/P (MAP) Pulse Ox O2 Delivery O2 Flow Rate FiO2


 


7/31/20 14:11   24  95 Room Air  


 


7/31/20 11:57  152  137/85    


 


7/31/20 11:00 98.7       





 98.7       


 


7/30/20 21:20       2.0 














                                    I & O   


 


 7/30/20 7/30/20 7/31/20





 15:00 23:00 07:00


 


Intake Total 500 ml 1279 ml 464 ml


 


Output Total 825 ml 325 ml 460 ml


 


Balance -325 ml 954 ml 4 ml











Physical Exam


Physical Exam:


GENERAL: pt in bed, appears weak -comfortable -alert


HEENT: Pupils equal, oral cavity dry. OC/Op - Dry


NECK:  Tracheostomy - no JVD


LUNGS: Diminished aeration bases,  CT on left 


HEART:  S1, S2, 


ABDOMEN: Distended mild- bowel sounds hypoactive, soft, richardson x 2, ROBERT drains, 

G-J tube. Some drainage around R quad tube and mild insertion site irritation


: [place] in place 


EXTREMITIES: Trace generalized edema, no cyanosis. Yeast in groin area


SKIN: warm touch. No signs of rash.  


LUE- Previous PICC site without signs of complications. PIV s clean


NEURO: - Alert and responsive


PICC RUE - clean


General:  Cooperative, No acute distress


Heart:  Other (distant heart sounds, tachycardic)


Lungs:  Crackles


Abdomen:  Soft, Other (multiple drains)


Extremities:  Other (Diffuse edema)


Skin:  No rashes, No significant lesion





Labs


Labs:





Laboratory Tests








Test


 7/30/20


17:49 7/30/20


23:00 7/31/20


05:01 7/31/20


08:36


 


Glucose (Fingerstick)


 137 mg/dL


(70-99) 117 mg/dL


(70-99) 146 mg/dL


(70-99) 141 mg/dL


(70-99)


 


Test


 7/31/20


12:07 


 


 





 


Glucose (Fingerstick)


 159 mg/dL


(70-99) 


 


 














Assessment and Plan


Assessmemt and Plan


Problems


Medical Problems:


(1) Acute pancreatitis


Status: Acute  





(2) Cholelithiasis


Status: Acute  











Comment


Review of Relevant


I have reviewed the following items josy (where applicable) has been applied.





Justicifation of Admission Dx:


Justifications for Admission:


Justification of Admission Dx:  Yes











JACINTO MIRANDA MD                    Jul 31, 2020 14:28

## 2020-07-31 NOTE — PDOC
SURGICAL PROGRESS NOTE


Subjective


resting


no complaints


Vital Signs





Vital Signs








  Date Time  Temp Pulse Resp B/P (MAP) Pulse Ox O2 Delivery O2 Flow Rate FiO2


 


7/31/20 08:40     96 Room Air  


 


7/31/20 07:20 98.5 112 20 132/78 (96)    





 98.5       


 


7/30/20 21:20       2.0 








I&O











Intake and Output 


 


 7/31/20





 07:00


 


Intake Total 2243 ml


 


Output Total 1610 ml


 


Balance 633 ml


 


 


 


IV Total 1283 ml


 


Tube Feeding 860 ml


 


Other 100 ml


 


Output Urine Total 975 ml


 


Drainage Total 635 ml


 


# Bowel Movements 2








General:  Cooperative, No acute distress


Abdomen:  Soft, Other (multiple drains)


Labs





Laboratory Tests








Test


 7/29/20


12:35 7/29/20


18:11 7/29/20


23:54 7/30/20


04:25


 


Glucose (Fingerstick)


 152 mg/dL


(70-99) 120 mg/dL


(70-99) 109 mg/dL


(70-99) 





 


White Blood Count


 


 


 


 11.5 x10^3/uL


(4.0-11.0)


 


Red Blood Count


 


 


 


 2.77 x10^6/uL


(3.50-5.40)


 


Hemoglobin


 


 


 


 7.6 g/dL


(12.0-15.5)


 


Hematocrit


 


 


 


 23.6 %


(36.0-47.0)


 


Mean Corpuscular Volume    85 fL () 


 


Mean Corpuscular Hemoglobin    27 pg (25-35) 


 


Mean Corpuscular Hemoglobin


Concent 


 


 


 32 g/dL


(31-37)


 


Red Cell Distribution Width


 


 


 


 16.4 %


(11.5-14.5)


 


Platelet Count


 


 


 


 671 x10^3/uL


(140-400)


 


Neutrophils (%) (Auto)    80 % (31-73) 


 


Lymphocytes (%) (Auto)    12 % (24-48) 


 


Monocytes (%) (Auto)    7 % (0-9) 


 


Eosinophils (%) (Auto)    1 % (0-3) 


 


Basophils (%) (Auto)    0 % (0-3) 


 


Neutrophils # (Auto)


 


 


 


 9.2 x10^3/uL


(1.8-7.7)


 


Lymphocytes # (Auto)


 


 


 


 1.4 x10^3/uL


(1.0-4.8)


 


Monocytes # (Auto)


 


 


 


 0.8 x10^3/uL


(0.0-1.1)


 


Eosinophils # (Auto)


 


 


 


 0.1 x10^3/uL


(0.0-0.7)


 


Basophils # (Auto)


 


 


 


 0.0 x10^3/uL


(0.0-0.2)


 


Sodium Level


 


 


 


 134 mmol/L


(136-145)


 


Potassium Level


 


 


 


 3.7 mmol/L


(3.5-5.1)


 


Chloride Level


 


 


 


 102 mmol/L


()


 


Carbon Dioxide Level


 


 


 


 26 mmol/L


(21-32)


 


Anion Gap    6 (6-14) 


 


Blood Urea Nitrogen    9 mg/dL (7-20) 


 


Creatinine


 


 


 


 0.5 mg/dL


(0.6-1.0)


 


Estimated GFR


(Cockcroft-Gault) 


 


 


 131.1 





 


Glucose Level


 


 


 


 118 mg/dL


(70-99)


 


Calcium Level


 


 


 


 11.3 mg/dL


(8.5-10.1)


 


Test


 7/30/20


06:23 7/30/20


17:49 7/30/20


23:00 7/31/20


05:01


 


Glucose (Fingerstick)


 115 mg/dL


(70-99) 137 mg/dL


(70-99) 117 mg/dL


(70-99) 146 mg/dL


(70-99)


 


Test


 7/31/20


08:36 


 


 





 


Glucose (Fingerstick)


 141 mg/dL


(70-99) 


 


 











Laboratory Tests








Test


 7/30/20


17:49 7/30/20


23:00 7/31/20


05:01 7/31/20


08:36


 


Glucose (Fingerstick)


 137 mg/dL


(70-99) 117 mg/dL


(70-99) 146 mg/dL


(70-99) 141 mg/dL


(70-99)








Problem List


Problems


Medical Problems:


(1) Acute pancreatitis


Status: Acute  





(2) Cholelithiasis


Status: Acute  








Assessment/Plan


supportive measures





Justicifation of Admission Dx:


Justifications for Admission:


Justification of Admission Dx:  Yes











SAURAV NASH APRN            Jul 31, 2020 10:45

## 2020-07-31 NOTE — PDOC
G I PROGRESS NOTE


Objective


Others' notes reviewed.


Review of Relevant


I have reviewed the following items josy (where applicable) has been applied.


Labs





Laboratory Tests








Test


 7/29/20


18:11 7/29/20


23:54 7/30/20


04:25 7/30/20


06:23


 


Glucose (Fingerstick)


 120 mg/dL


(70-99) 109 mg/dL


(70-99) 


 115 mg/dL


(70-99)


 


White Blood Count


 


 


 11.5 x10^3/uL


(4.0-11.0) 





 


Red Blood Count


 


 


 2.77 x10^6/uL


(3.50-5.40) 





 


Hemoglobin


 


 


 7.6 g/dL


(12.0-15.5) 





 


Hematocrit


 


 


 23.6 %


(36.0-47.0) 





 


Mean Corpuscular Volume   85 fL ()  


 


Mean Corpuscular Hemoglobin   27 pg (25-35)  


 


Mean Corpuscular Hemoglobin


Concent 


 


 32 g/dL


(31-37) 





 


Red Cell Distribution Width


 


 


 16.4 %


(11.5-14.5) 





 


Platelet Count


 


 


 671 x10^3/uL


(140-400) 





 


Neutrophils (%) (Auto)   80 % (31-73)  


 


Lymphocytes (%) (Auto)   12 % (24-48)  


 


Monocytes (%) (Auto)   7 % (0-9)  


 


Eosinophils (%) (Auto)   1 % (0-3)  


 


Basophils (%) (Auto)   0 % (0-3)  


 


Neutrophils # (Auto)


 


 


 9.2 x10^3/uL


(1.8-7.7) 





 


Lymphocytes # (Auto)


 


 


 1.4 x10^3/uL


(1.0-4.8) 





 


Monocytes # (Auto)


 


 


 0.8 x10^3/uL


(0.0-1.1) 





 


Eosinophils # (Auto)


 


 


 0.1 x10^3/uL


(0.0-0.7) 





 


Basophils # (Auto)


 


 


 0.0 x10^3/uL


(0.0-0.2) 





 


Sodium Level


 


 


 134 mmol/L


(136-145) 





 


Potassium Level


 


 


 3.7 mmol/L


(3.5-5.1) 





 


Chloride Level


 


 


 102 mmol/L


() 





 


Carbon Dioxide Level


 


 


 26 mmol/L


(21-32) 





 


Anion Gap   6 (6-14)  


 


Blood Urea Nitrogen   9 mg/dL (7-20)  


 


Creatinine


 


 


 0.5 mg/dL


(0.6-1.0) 





 


Estimated GFR


(Cockcroft-Gault) 


 


 131.1 


 





 


Glucose Level


 


 


 118 mg/dL


(70-99) 





 


Calcium Level


 


 


 11.3 mg/dL


(8.5-10.1) 





 


Test


 7/30/20


17:49 7/30/20


23:00 7/31/20


05:01 7/31/20


08:36


 


Glucose (Fingerstick)


 137 mg/dL


(70-99) 117 mg/dL


(70-99) 146 mg/dL


(70-99) 141 mg/dL


(70-99)


 


Test


 7/31/20


12:07 


 


 





 


Glucose (Fingerstick)


 159 mg/dL


(70-99) 


 


 











Laboratory Tests








Test


 7/30/20


17:49 7/30/20


23:00 7/31/20


05:01 7/31/20


08:36


 


Glucose (Fingerstick)


 137 mg/dL


(70-99) 117 mg/dL


(70-99) 146 mg/dL


(70-99) 141 mg/dL


(70-99)


 


Test


 7/31/20


12:07 


 


 





 


Glucose (Fingerstick)


 159 mg/dL


(70-99) 


 


 











Microbiology


7/26/20 Gram Stain - Final, Complete


          


7/26/20 Aerobic Culture - Final, Complete


          


7/26/20 Blood Culture - Final, Complete


          NO GROWTH AFTER 5 DAYS


7/26/20 Urine Culture - Final, Complete


          


7/21/20 Gram Stain - Final, Complete


          


7/21/20 Aerobic and Anaerobic Culture - Final, Complete


          


7/19/20 Gram Stain Evaluation - Final, Complete


          


7/19/20 Respiratory Culture - Final, Complete


          


7/19/20 Antimicrobic Susceptibility - Final, Complete


          


6/30/20 Gram Stain - Final, Complete


          


6/30/20 Aerobic and Anaerobic Culture - Final, Complete


          


6/30/20 Antimicrobic Susceptibility - Final, Complete


Medications





Current Medications


Sodium Chloride 1,000 ml @  1,000 mls/hr Q1H IV  Last administered on 3/16/20at 

03:00;  Start 3/16/20 at 03:00;  Stop 3/16/20 at 03:59;  Status DC


Ondansetron HCl (Zofran) 4 mg 1X  ONCE IVP  Last administered on 3/16/20at 

03:27;  Start 3/16/20 at 03:00;  Stop 3/16/20 at 03:01;  Status DC


Morphine Sulfate (Morphine Sulfate) 4 mg 1X  ONCE IV ;  Start 3/16/20 at 03:00; 

Stop 3/16/20 at 03:01;  Status Cancel


Ketorolac Tromethamine (Toradol 30mg Vial) 30 mg 1X  ONCE IV  Last administered 

on 3/16/20at 02:54;  Start 3/16/20 at 03:00;  Stop 3/16/20 at 03:01;  Status DC


Fentanyl Citrate (Fentanyl 2ml Vial) 25 mcg 1X  ONCE IVP  Last administered on 

3/16/20at 03:23;  Start 3/16/20 at 03:30;  Stop 3/16/20 at 03:31;  Status DC


Fentanyl Citrate (Fentanyl 2ml Vial) 100 mcg STK-MED ONCE .ROUTE ;  Start 

3/16/20 at 03:18;  Stop 3/16/20 at 03:18;  Status DC


Iohexol (Omnipaque 350 Mg/ml) 90 ml 1X  ONCE IV  Last administered on 3/16/20at 

03:25;  Start 3/16/20 at 03:30;  Stop 3/16/20 at 03:31;  Status DC


Info (CONTRAST GIVEN -- Rx MONITORING) 1 each PRN DAILY  PRN MC SEE COMMENTS;  

Start 3/16/20 at 03:30;  Stop 3/18/20 at 03:29;  Status DC


Hydromorphone HCl (Dilaudid) 0.5 mg 1X  ONCE IV  Last administered on 3/16/20at 

03:55;  Start 3/16/20 at 04:30;  Stop 3/16/20 at 04:32;  Status DC


Ondansetron HCl (Zofran) 4 mg PRN Q8HRS  PRN IV NAUSEA/VOMITING 1ST CHOICE;  

Start 3/16/20 at 05:00;  Stop 3/16/20 at 09:27;  Status DC


Morphine Sulfate (Morphine Sulfate) 2 mg PRN Q2HR  PRN IV SEVERE PAIN 7-10 Last 

administered on 3/17/20at 12:26;  Start 3/16/20 at 05:00;  Stop 3/17/20 at 

14:15;  Status DC


Sodium Chloride 1,000 ml @  125 mls/hr Q8H IV  Last administered on 3/16/20at 

20:56;  Start 3/16/20 at 05:00;  Stop 3/17/20 at 04:59;  Status DC


Hydromorphone HCl (Dilaudid) 0.5 mg PRN Q3HRS  PRN IV SEVERE PAIN 7-10 Last 

administered on 3/17/20at 10:06;  Start 3/16/20 at 05:00;  Stop 3/17/20 at 

12:01;  Status DC


Piperacillin Sod/ Tazobactam Sod 4.5 gm/Sodium Chloride 100 ml @  200 mls/hr 1X 

ONCE IV  Last administered on 3/16/20at 05:44;  Start 3/16/20 at 06:00;  Stop 

3/16/20 at 06:29;  Status DC


Ondansetron HCl (Zofran) 4 mg PRN Q4HRS  PRN IV NAUSEA/VOMITING 1ST CHOICE Last 

administered on 7/29/20at 22:53;  Start 3/16/20 at 09:30


Insulin Human Lispro (HumaLOG) 0-9 UNITS Q6HRS SQ  Last administered on 

7/31/20at 12:15;  Start 3/16/20 at 09:30


Dextrose (Dextrose 50%-Water Syringe) 12.5 gm PRN Q15MIN  PRN IV SEE COMMENTS;  

Start 3/16/20 at 09:30


Pantoprazole Sodium (PROTONIX VIAL for IV PUSH) 40 mg DAILYAC IVP  Last 

administered on 7/31/20at 08:39;  Start 3/16/20 at 11:30


Prochlorperazine Edisylate (Compazine) 10 mg PRN Q6HRS  PRN IV NAUSEA/VOMITING, 

2nd CHOICE Last administered on 7/27/20at 09:49;  Start 3/16/20 at 17:45


Atenolol (Tenormin) 100 mg DAILY PO ;  Start 3/17/20 at 09:00;  Stop 3/16/20 at 

20:08;  Status DC


Metoprolol Tartrate (Lopressor Vial) 2.5 mg Q6HRS IVP  Last administered on 

3/17/20at 05:51;  Start 3/16/20 at 20:15;  Stop 3/17/20 at 10:02;  Status DC


Metoprolol Tartrate (Lopressor Vial) 5 mg Q6HRS IVP  Last administered on 

3/26/20at 00:12;  Start 3/17/20 at 10:15;  Stop 3/28/20 at 08:48;  Status DC


Hydromorphone HCl (Dilaudid) 1 mg PRN Q3HRS  PRN IV SEVERE PAIN 7-10 Last 

administered on 3/23/20at 05:13;  Start 3/17/20 at 12:00;  Stop 3/31/20 at 

00:25;  Status DC


Lidocaine HCl (Buffered Lidocaine 1%) 3 ml STK-MED ONCE .ROUTE ;  Start 3/17/20 

at 12:55;  Stop 3/17/20 at 12:56;  Status DC


Albumin Human 500 ml @  125 mls/hr 1X  ONCE IV  Last administered on 3/17/20at 

14:33;  Start 3/17/20 at 14:30;  Stop 3/17/20 at 18:32;  Status DC


Norepinephrine Bitartrate 8 mg/ Dextrose 258 ml @  17.299 mls/ hr CONT  PRN IV 

PER PROTOCOL Last administered on 4/14/20at 12:48;  Start 3/17/20 at 15:30;  

Stop 4/17/20 at 09:19;  Status DC


Sodium Chloride 1,000 ml @  125 mls/hr Q8H IV  Last administered on 3/17/20at 

21:04;  Start 3/17/20 at 16:00;  Stop 3/18/20 at 02:42;  Status DC


Albumin Human 500 ml @  125 mls/hr PRN BID  PRN IV After every 2L NSS & BP < 

90mm Last administered on 6/30/20at 16:06;  Start 3/17/20 at 16:00;  Stop 7/3/20

at 09:30;  Status DC


Iohexol (Omnipaque 300 Mg/ml) 60 ml 1X  ONCE IV  Last administered on 3/17/20at 

17:20;  Start 3/17/20 at 17:00;  Stop 3/17/20 at 17:01;  Status DC


Info (CONTRAST GIVEN -- Rx MONITORING) 1 each PRN DAILY  PRN MC SEE COMMENTS;  

Start 3/17/20 at 17:00;  Stop 3/19/20 at 16:59;  Status DC


Meropenem 1 gm/ Sodium Chloride 100 ml @  200 mls/hr Q8HRS IV  Last administered

on 3/18/20at 05:45;  Start 3/17/20 at 20:00;  Stop 3/18/20 at 08:48;  Status DC


Furosemide (Lasix) 40 mg 1X  ONCE IVP  Last administered on 3/17/20at 22:12;  

Start 3/17/20 at 22:30;  Stop 3/17/20 at 22:31;  Status DC


Calcium Chloride 1000 mg/Sodium Chloride 110 ml @  220 mls/hr 1X  ONCE IV  Last 

administered on 3/17/20at 22:11;  Start 3/17/20 at 22:30;  Stop 3/17/20 at 22

:59;  Status DC


Albuterol Sulfate (Ventolin Neb Soln) 2.5 mg 1X  ONCE NEB  Last administered on 

3/18/20at 00:56;  Start 3/17/20 at 22:30;  Stop 3/17/20 at 22:31;  Status DC


Insulin Human Regular (HumuLIN R VIAL) 5 unit 1X  ONCE IV  Last administered on 

3/17/20at 22:14;  Start 3/17/20 at 22:30;  Stop 3/17/20 at 22:31;  Status DC


Magnesium Sulfate 50 ml @ 25 mls/hr 1X  ONCE IV  Last administered on 3/18/20at 

02:57;  Start 3/18/20 at 03:00;  Stop 3/18/20 at 04:59;  Status DC


Calcium Gluconate 1000 mg/Sodium Chloride 110 ml @  220 mls/hr 1X  ONCE IV  Last

administered on 3/18/20at 02:46;  Start 3/18/20 at 03:00;  Stop 3/18/20 at 

03:29;  Status DC


Sodium Chloride 1,000 ml @  200 mls/hr Q5H IV  Last administered on 3/18/20at 

02:46;  Start 3/18/20 at 03:00;  Stop 3/18/20 at 10:21;  Status DC


Calcium Gluconate 1000 mg/Sodium Chloride 110 ml @  220 mls/hr 1X  ONCE IV  Last

administered on 3/18/20at 03:21;  Start 3/18/20 at 03:30;  Stop 3/18/20 at 

03:59;  Status DC


Sodium Bicarbonate 50 meq/Sodium Chloride 1,050 ml @  75 mls/hr Q14H IV  Last 

administered on 3/22/20at 21:10;  Start 3/18/20 at 07:30;  Stop 3/23/20 at 

10:28;  Status DC


Calcium Gluconate 2000 mg/Sodium Chloride 120 ml @  220 mls/hr 1X  ONCE IV  Last

administered on 3/18/20at 09:05;  Start 3/18/20 at 07:30;  Stop 3/18/20 at 

08:02;  Status DC


Lidocaine HCl (Xylocaine-Mpf 1% 2ml Vial) 2 ml STK-MED ONCE .ROUTE ;  Start 

3/18/20 at 08:47;  Stop 3/18/20 at 08:47;  Status DC


Meropenem 500 mg/ Sodium Chloride 50 ml @  100 mls/hr Q12HR IV  Last 

administered on 3/23/20at 21:01;  Start 3/18/20 at 18:00;  Stop 3/24/20 at 

07:58;  Status DC


Lidocaine HCl (Buffered Lidocaine 1%) 3 ml STK-MED ONCE .ROUTE ;  Start 3/18/20 

at 09:46;  Stop 3/18/20 at 09:46;  Status DC


Lidocaine HCl (Buffered Lidocaine 1%) 6 ml 1X  ONCE INJ  Last administered on 

3/18/20at 10:26;  Start 3/18/20 at 10:15;  Stop 3/18/20 at 10:16;  Status DC


Info (Tpn Per Pharmacy) 1 each PRN DAILY  PRN MC SEE COMMENTS Last administered 

on 7/26/20at 08:31;  Start 3/18/20 at 12:00;  Stop 7/26/20 at 10:41;  Status DC


Sodium Chloride 1,000 ml @  1,000 mls/hr Q1H PRN IV hypotension;  Start 3/18/20 

at 12:07;  Stop 3/18/20 at 18:06;  Status DC


Diphenhydramine HCl (Benadryl) 25 mg 1X PRN  PRN IV ITCHING;  Start 3/18/20 at 

12:15;  Stop 3/19/20 at 12:14;  Status DC


Diphenhydramine HCl (Benadryl) 25 mg 1X PRN  PRN IV ITCHING;  Start 3/18/20 at 

12:15;  Stop 3/19/20 at 12:14;  Status DC


Sodium Chloride 1,000 ml @  400 mls/hr Q2H30M PRN IV PATENCY;  Start 3/18/20 at 

12:07;  Stop 3/19/20 at 00:06;  Status DC


Info (PHARMACY MONITORING -- do not chart) 1 each PRN DAILY  PRN MC SEE 

COMMENTS;  Start 3/18/20 at 12:15;  Stop 3/20/20 at 08:13;  Status DC


Sodium Chloride 90 meq/Calcium Gluconate 10 meq/ Multivitamins 10 ml/Chromium/ 

Copper/Manganese/ Seleni/Zn 1 ml/ Total Parenteral Nutrition/Amino 

Acids/Dextrose/ Fat Emulsion Intravenous 55.005 ml  @ 2.292 mls/hr TPN  CONT IV 

;  Start 3/18/20 at 22:00;  Stop 3/18/20 at 12:33;  Status DC


Info (Tpn Per Pharmacy) 1 each PRN DAILY  PRN MC SEE COMMENTS;  Start 3/18/20 at

12:30;  Status UNV


Sodium Chloride 90 meq/Calcium Gluconate 10 meq/ Multivitamins 10 ml/Chromium/ 

Copper/Manganese/ Seleni/Zn 0.5 ml/ Total Parenteral Nutrition/Amino 

Acids/Dextrose/ Fat Emulsion Intravenous 1,512 ml @  63 mls/hr TPN  CONT IV  

Last administered on 3/18/20at 22:06;  Start 3/18/20 at 22:00;  Stop 3/19/20 at 

21:59;  Status DC


Calcium Carbonate/ Glycine (Tums) 500 mg PRN AFTMEALHC  PRN PO INDIGESTION;  

Start 3/18/20 at 17:45;  Stop 5/13/20 at 10:25;  Status DC


Calcium Gluconate (Calcium Gluconate) 2,000 mg 1X  ONCE IVP  Last administered 

on 3/19/20at 02:19;  Start 3/19/20 at 02:15;  Stop 3/19/20 at 02:16;  Status DC


Calcium Chloride 3000 mg/Sodium Chloride 1,030 ml @  50 mls/hr [id number] IV  Last 

administered on 3/21/20at 02:17;  Start 3/19/20 at 08:00;  Stop 3/21/20 at 15

:23;  Status DC


Lorazepam (Ativan Inj) 1 mg PRN Q4HRS  PRN IVP ANXIETY / AGITATION, 2nd choic 

Last administered on 4/17/20at 03:51;  Start 3/19/20 at 09:00;  Stop 4/17/20 at 

09:19;  Status DC


Sodium Chloride 1,000 ml @  1,000 mls/hr Q1H PRN IV hypotension;  Start 3/19/20 

at 08:56;  Stop 3/19/20 at 14:55;  Status DC


Albumin Human 200 ml @  200 mls/hr 1X PRN  PRN IV Hypotension;  Start 3/19/20 at

09:00;  Stop 3/19/20 at 14:59;  Status DC


Diphenhydramine HCl (Benadryl) 25 mg 1X PRN  PRN IV ITCHING;  Start 3/19/20 at 

09:00;  Stop 3/20/20 at 08:59;  Status DC


Diphenhydramine HCl (Benadryl) 25 mg 1X PRN  PRN IV ITCHING;  Start 3/19/20 at 

09:00;  Stop 3/20/20 at 08:59;  Status DC


Sodium Chloride 1,000 ml @  400 mls/hr Q2H30M PRN IV PATENCY;  Start 3/19/20 at 

08:56;  Stop 3/19/20 at 20:55;  Status DC


Info (PHARMACY MONITORING -- do not chart) 1 each PRN DAILY  PRN MC SEE 

COMMENTS;  Start 3/19/20 at 09:00;  Status UNV


Info (PHARMACY MONITORING -- do not chart) 1 each PRN DAILY  PRN MC SEE 

COMMENTS;  Start 3/19/20 at 09:00;  Stop 3/20/20 at 08:13;  Status DC


Digoxin (Lanoxin) 500 mcg 1X  ONCE IV  Last administered on 3/19/20at 10:04;  

Start 3/19/20 at 10:00;  Stop 3/19/20 at 10:01;  Status DC


Digoxin (Lanoxin) 125 mcg 1X  ONCE IV  Last administered on 3/19/20at 17:10;  

Start 3/19/20 at 18:00;  Stop 3/19/20 at 18:01;  Status DC


Magnesium Sulfate 100 ml @  25 mls/hr 1X  ONCE IV  Last administered on 

3/19/20at 12:48;  Start 3/19/20 at 13:00;  Stop 3/19/20 at 16:59;  Status DC


Sodium Chloride 90 meq/Magnesium Sulfate 10 meq/ Calcium Gluconate 20 meq/ 

Multivitamins 10 ml/Chromium/ Copper/Manganese/ Seleni/Zn 0.5 ml/ Total 

Parenteral Nutrition/Amino Acids/Dextrose/ Fat Emulsion Intravenous 1,512 ml @  

63 mls/hr TPN  CONT IV  Last administered on 3/19/20at 22:25;  Start 3/19/20 at 

22:00;  Stop 3/20/20 at 21:59;  Status DC


Sodium Chloride 1,000 ml @  1,000 mls/hr Q1H PRN IV hypotension;  Start 3/20/20 

at 08:05;  Stop 3/20/20 at 14:04;  Status DC


Albumin Human 200 ml @  200 mls/hr 1X  ONCE IV  Last administered on 3/20/20at 

08:57;  Start 3/20/20 at 08:15;  Stop 3/20/20 at 09:14;  Status DC


Diphenhydramine HCl (Benadryl) 25 mg 1X PRN  PRN IV ITCHING;  Start 3/20/20 at 

08:15;  Stop 3/21/20 at 08:14;  Status DC


Diphenhydramine HCl (Benadryl) 25 mg 1X PRN  PRN IV ITCHING;  Start 3/20/20 at 

08:15;  Stop 3/21/20 at 08:14;  Status DC


Sodium Chloride 1,000 ml @  400 mls/hr Q2H30M PRN IV PATENCY;  Start 3/20/20 at 

08:05;  Stop 3/20/20 at 20:04;  Status DC


Info (PHARMACY MONITORING -- do not chart) 1 each PRN DAILY  PRN MC SEE 

COMMENTS;  Start 3/20/20 at 08:15;  Stop 3/24/20 at 07:57;  Status DC


Sodium Chloride 90 meq/Potassium Chloride 15 meq/ Potassium Phosphate 10 mmol/ 

Magnesium Sulfate 10 meq/Calcium Gluconate 20 meq/ Multivitamins 10 ml/Chromium/

Copper/Manganese/ Seleni/Zn 0.5 ml/ Total Parenteral Nutrition/Amino 

Acids/Dextrose/ Fat Emulsion Intravenous 1,512 ml @  63 mls/hr TPN  CONT IV  

Last administered on 3/20/20at 21:01;  Start 3/20/20 at 22:00;  Stop 3/21/20 at 

21:59;  Status DC


Potassium Chloride/Water 100 ml @  100 mls/hr 1X  ONCE IV  Last administered on 

3/20/20at 14:09;  Start 3/20/20 at 14:00;  Stop 3/20/20 at 14:59;  Status DC


Benzocaine (Hurricaine One) 1 spray 1X  ONCE MM  Last administered on 3/20/20at 

16:38;  Start 3/20/20 at 14:30;  Stop 3/20/20 at 14:31;  Status DC


Lidocaine HCl (Glydo (Lidocaine) Jelly) 1 thomas 1X  ONCE MM  Last administered on 

3/20/20at 16:38;  Start 3/20/20 at 14:30;  Stop 3/20/20 at 14:31;  Status DC


Linezolid/Dextrose 300 ml @  300 mls/hr Q12HR IV  Last administered on 3/26/20at

21:04;  Start 3/20/20 at 20:00;  Stop 3/27/20 at 07:50;  Status DC


Acetaminophen (Tylenol) 650 mg PRN Q6HRS  PRN PO MILD PAIN / TEMP;  Start 

3/21/20 at 03:30;  Stop 3/21/20 at 03:36;  Status DC


Acetaminophen (Tylenol) 650 mg PRN Q6HRS  PRN PEG MILD PAIN / TEMP Last 

administered on 4/16/20at 19:56;  Start 3/21/20 at 03:36;  Stop 5/13/20 at 

10:25;  Status DC


Sodium Chloride 1,000 ml @  1,000 mls/hr Q1H PRN IV hypotension;  Start 3/21/20 

at 07:50;  Stop 3/21/20 at 13:49;  Status DC


Albumin Human 200 ml @  200 mls/hr 1X PRN  PRN IV Hypotension;  Start 3/21/20 at

08:00;  Stop 3/21/20 at 13:59;  Status DC


Sodium Chloride (Normal Saline Flush) 10 ml 1X PRN  PRN IV AP catheter pack;  

Start 3/21/20 at 08:00;  Stop 3/22/20 at 07:59;  Status DC


Sodium Chloride (Normal Saline Flush) 10 ml 1X PRN  PRN IV  catheter pack;  

Start 3/21/20 at 08:00;  Stop 3/22/20 at 07:59;  Status DC


Sodium Chloride 1,000 ml @  400 mls/hr Q2H30M PRN IV PATENCY;  Start 3/21/20 at 

07:50;  Stop 3/21/20 at 19:49;  Status DC


Info (PHARMACY MONITORING -- do not chart) 1 each PRN DAILY  PRN MC SEE 

COMMENTS;  Start 3/21/20 at 08:00;  Status UNV


Info (PHARMACY MONITORING -- do not chart) 1 each PRN DAILY  PRN MC SEE 

COMMENTS;  Start 3/21/20 at 08:00;  Stop 3/23/20 at 08:25;  Status DC


Sodium Chloride 90 meq/Potassium Chloride 15 meq/ Potassium Phosphate 10 mmol/ 

Magnesium Sulfate 10 meq/Calcium Gluconate 20 meq/ Multivitamins 10 ml/Chromium/

Copper/Manganese/ Seleni/Zn 0.5 ml/ Total Parenteral Nutrition/Amino 

Acids/Dextrose/ Fat Emulsion Intravenous 1,512 ml @  63 mls/hr TPN  CONT IV  

Last administered on 3/21/20at 20:57;  Start 3/21/20 at 22:00;  Stop 3/22/20 at 

21:59;  Status DC


Sodium Chloride 90 meq/Potassium Chloride 15 meq/ Potassium Phosphate 15 mmol/ 

Magnesium Sulfate 10 meq/Calcium Gluconate 20 meq/ Multivitamins 10 ml/Chromium/

Copper/Manganese/ Seleni/Zn 0.5 ml/ Total Parenteral Nutrition/Amino 

Acids/Dextrose/ Fat Emulsion Intravenous 1,512 ml @  63 mls/hr TPN  CONT IV ;  

Start 3/22/20 at 22:00;  Stop 3/22/20 at 14:16;  Status DC


Sodium Chloride 90 meq/Potassium Chloride 15 meq/ Potassium Phosphate 15 mmol/ 

Magnesium Sulfate 10 meq/Calcium Gluconate 20 meq/ Multivitamins 10 ml/Chromium/

Copper/Manganese/ Seleni/Zn 0.5 ml/ Total Parenteral Nutrition/Amino 

Acids/Dextrose/ Fat Emulsion Intravenous 1,200 ml @  50 mls/hr TPN  CONT IV ;  

Start 3/22/20 at 22:00;  Stop 3/22/20 at 14:17;  Status DC


Sodium Chloride 90 meq/Potassium Chloride 15 meq/ Potassium Phosphate 10 mmol/ 

Magnesium Sulfate 10 meq/Calcium Gluconate 20 meq/ Multivitamins 10 ml/Chromium/

Copper/Manganese/ Seleni/Zn 0.5 ml/ Total Parenteral Nutrition/Amino 

Acids/Dextrose/ Fat Emulsion Intravenous 1,200 ml @  50 mls/hr TPN  CONT IV  

Last administered on 3/22/20at 23:29;  Start 3/22/20 at 22:00;  Stop 3/23/20 at 

21:59;  Status DC


Sodium Chloride 1,000 ml @  1,000 mls/hr Q1H PRN IV hypotension;  Start 3/23/20 

at 07:28;  Stop 3/23/20 at 13:27;  Status DC


Albumin Human 200 ml @  200 mls/hr 1X  ONCE IV  Last administered on 3/23/20at 

08:51;  Start 3/23/20 at 07:30;  Stop 3/23/20 at 08:29;  Status DC


Diphenhydramine HCl (Benadryl) 25 mg 1X PRN  PRN IV ITCHING;  Start 3/23/20 at 

07:30;  Stop 3/24/20 at 07:29;  Status DC


Diphenhydramine HCl (Benadryl) 25 mg 1X PRN  PRN IV ITCHING;  Start 3/23/20 at 

07:30;  Stop 3/24/20 at 07:29;  Status DC


Sodium Chloride 1,000 ml @  400 mls/hr Q2H30M PRN IV PATENCY;  Start 3/23/20 at 

07:28;  Stop 3/23/20 at 19:27;  Status DC


Info (PHARMACY MONITORING -- do not chart) 1 each PRN DAILY  PRN MC SEE 

COMMENTS;  Start 3/23/20 at 07:30;  Stop 4/3/20 at 13:01;  Status DC


Metronidazole 100 ml @  100 mls/hr Q6HRS IV  Last administered on 4/8/20at 

06:26;  Start 3/23/20 at 08:30;  Stop 4/8/20 at 09:58;  Status DC


Micafungin Sodium 100 mg/Dextrose 100 ml @  100 mls/hr Q24H IV  Last 

administered on 4/30/20at 08:18;  Start 3/23/20 at 09:00;  Stop 4/30/20 at 

20:58;  Status DC


Propofol 0 ml @ As Directed STK-MED ONCE IV ;  Start 3/23/20 at 07:53;  Stop 

3/23/20 at 07:53;  Status DC


Etomidate (Amidate) 20 mg STK-MED ONCE IV ;  Start 3/23/20 at 07:53;  Stop 

3/23/20 at 07:54;  Status DC


Midazolam HCl (Versed) 5 mg STK-MED ONCE .ROUTE ;  Start 3/23/20 at 07:57;  Stop

3/23/20 at 07:57;  Status DC


Fentanyl Citrate 30 ml @ 0 mls/hr CONT  PRN IV SEE PROTOCOL Last administered on

4/17/20at 06:12;  Start 3/23/20 at 08:15;  Stop 4/17/20 at 09:19;  Status DC


Artificial Tears (Artificial Tears) 1 drop PRN Q1HR  PRN OU DRY EYE, 1st choice;

 Start 3/23/20 at 08:15;  Stop 4/29/20 at 05:31;  Status DC


Midazolam HCl 50 mg/Sodium Chloride 50 ml @ 0 mls/hr CONT  PRN IV SEE PROTOCOL 

Last administered on 3/26/20at 22:39;  Start 3/23/20 at 08:15;  Stop 3/28/20 at 

15:59;  Status DC


Etomidate (Amidate) 8 mg 1X  ONCE IV  Last administered on 3/23/20at 08:33;  St

art 3/23/20 at 08:30;  Stop 3/23/20 at 08:31;  Status DC


Succinylcholine Chloride (Anectine) 120 mg 1X  ONCE IV  Last administered on 

3/23/20at 08:34;  Start 3/23/20 at 08:30;  Stop 3/23/20 at 08:31;  Status DC


Midazolam HCl (Versed) 5 mg 1X  ONCE IV ;  Start 3/23/20 at 08:30;  Stop 3/23/20

at 08:31;  Status DC


Potassium Chloride 15 meq/ Bicarbonate Dialysis Soln w/ out KCl 5,007.5 ml  @ 

1,000 mls/ hr Q5H1M IV  Last administered on 3/24/20at 11:11;  Start 3/23/20 at 

12:00;  Stop 3/24/20 at 11:15;  Status DC


Potassium Chloride 15 meq/ Bicarbonate Dialysis Soln w/ out KCl 5,007.5 ml  @ 

1,000 mls/ hr Q5H1M IV  Last administered on 3/24/20at 11:12;  Start 3/23/20 at 

12:00;  Stop 3/24/20 at 11:17;  Status DC


Potassium Chloride 15 meq/ Bicarbonate Dialysis Soln w/ out KCl 5,007.5 ml  @ 

1,000 mls/ hr Q5H1M IV  Last administered on 3/24/20at 11:11;  Start 3/23/20 at 

12:00;  Stop 3/24/20 at 11:19;  Status DC


Sodium Chloride 90 meq/Potassium Chloride 15 meq/ Potassium Phosphate 10 mmol/ 

Magnesium Sulfate 10 meq/Calcium Gluconate 20 meq/ Multivitamins 10 ml/Chromium/

Copper/Manganese/ Seleni/Zn 0.5 ml/ Total Parenteral Nutrition/Amino Acids/Dext

verenice/ Fat Emulsion Intravenous 1,400 ml @  58.333 mls/ hr TPN  CONT IV  Last 

administered on 3/23/20at 21:42;  Start 3/23/20 at 22:00;  Stop 3/24/20 at 

21:59;  Status DC


Heparin Sodium (Porcine) (Heparin Sodium) 5,000 unit Q8HRS SQ  Last administered

on 3/28/20at 05:55;  Start 3/23/20 at 15:00;  Stop 3/28/20 at 13:28;  Status DC


Meropenem 500 mg/ Sodium Chloride 50 ml @  100 mls/hr Q6HRS IV  Last 

administered on 3/25/20at 06:00;  Start 3/24/20 at 09:00;  Stop 3/25/20 at 

07:29;  Status DC


Potassium Phosphate 20 mmol/ Sodium Chloride 106.6667 ml @  51.667 m... 1X  ONCE

IV  Last administered on 3/24/20at 11:22;  Start 3/24/20 at 10:15;  Stop 3/24/20

at 12:18;  Status DC


Acetaminophen (Tylenol Supp) 650 mg PRN Q6HRS  PRN [place] MILD PAIN / TEMP > 100.3'F

Last administered on 7/25/20at 19:52;  Start 3/24/20 at 10:30


Potassium Chloride/Water 100 ml @  100 mls/hr Q1H IV  Last administered on 3/

24/20at 12:12;  Start 3/24/20 at 11:00;  Stop 3/24/20 at 12:59;  Status DC


Potassium Chloride 20 meq/ Bicarbonate Dialysis Soln w/ out KCl 5,010 ml @  

1,000 mls/hr Q5H1M IV  Last administered on 3/25/20at 08:48;  Start 3/24/20 at 

12:00;  Stop 3/25/20 at 13:03;  Status DC


Potassium Chloride 20 meq/ Bicarbonate Dialysis Soln w/ out KCl 5,010 ml @  

1,000 mls/hr Q5H1M IV  Last administered on 3/29/20at 14:52;  Start 3/24/20 at 

11:30;  Stop 3/29/20 at 19:59;  Status DC


Potassium Chloride 20 meq/ Bicarbonate Dialysis Soln w/ out KCl 5,010 ml @  

1,000 mls/hr Q5H1M IV  Last administered on 3/29/20at 14:53;  Start 3/24/20 at 

11:30;  Stop 3/29/20 at 19:59;  Status DC


Sodium Chloride 90 meq/Potassium Chloride 15 meq/ Potassium Phosphate 15 mmol/ 

Magnesium Sulfate 10 meq/Calcium Gluconate 15 meq/ Multivitamins 10 ml/Chromium/

Copper/Manganese/ Seleni/Zn 0.5 ml/ Total Parenteral Nutrition/Amino A

cids/Dextrose/ Fat Emulsion Intravenous 1,400 ml @  58.333 mls/ hr TPN  CONT IV 

Last administered on 3/24/20at 22:17;  Start 3/24/20 at 22:00;  Stop 3/25/20 at 

21:59;  Status DC


Cefepime HCl (Maxipime) 2 gm Q12HR IVP  Last administered on 4/7/20at 20:56;  

Start 3/25/20 at 09:00;  Stop 4/8/20 at 09:58;  Status DC


Daptomycin 500 mg/ Sodium Chloride 50 ml @  100 mls/hr Q48H IV  Last 

administered on 4/10/20at 09:57;  Start 3/25/20 at 08:30;  Stop 4/10/20 at 

10:07;  Status DC


Lidocaine HCl (Buffered Lidocaine 1%) 3 ml 1X  ONCE INJ  Last administered on 

3/25/20at 10:27;  Start 3/25/20 at 10:30;  Stop 3/25/20 at 10:31;  Status DC


Potassium Phosphate 20 mmol/ Sodium Chloride 106.6667 ml @  51.667 m... 1X  ONCE

IV  Last administered on 3/25/20at 12:51;  Start 3/25/20 at 13:00;  Stop 3/25/20

at 15:03;  Status DC


Sodium Chloride 90 meq/Potassium Chloride 15 meq/ Potassium Phosphate 18 mmol/ 

Magnesium Sulfate 8 meq/Calcium Gluconate 15 meq/ Multivitamins 10 ml/Chromium/ 

Copper/Manganese/ Seleni/Zn 0.5 ml/ Total Parenteral Nutrition/Amino 

Acids/Dextrose/ Fat Emulsion Intravenous 1,400 ml @  58.333 mls/ hr TPN  CONT IV

 Last administered on 3/25/20at 22:16;  Start 3/25/20 at 22:00;  Stop 3/26/20 at

21:59;  Status DC


Potassium Chloride 20 meq/ Bicarbonate Dialysis Soln w/ out KCl 5,010 ml @  

1,000 mls/hr Q5H1M IV  Last administered on 3/29/20at 14:54;  Start 3/25/20 at 

16:00;  Stop 3/29/20 at 19:59;  Status DC


Multi-Ingred Cream/Lotion/Oil/ Oint (Artificial Tears Eye Ointment) 1 thomas PRN 

Q1HR  PRN OU DRY EYE, 2nd choice Last administered on 4/13/20at 08:19;  Start 

3/25/20 at 17:30;  Stop 6/3/20 at 14:39;  Status DC


Sodium Chloride 90 meq/Potassium Chloride 15 meq/ Potassium Phosphate 18 mmol/ 

Magnesium Sulfate 8 meq/Calcium Gluconate 15 meq/ Multivitamins 10 ml/Chromium/ 

Copper/Manganese/ Seleni/Zn 0.5 ml/ Total Parenteral Nutrition/Amino 

Acids/Dextrose/ Fat Emulsion Intravenous 1,400 ml @  58.333 mls/ hr TPN  CONT IV

 Last administered on 3/26/20at 22:00;  Start 3/26/20 at 22:00;  Stop 3/27/20 at

21:59;  Status DC


Albumin Human 500 ml @  125 mls/hr 1X  ONCE IV ;  Start 3/26/20 at 14:15;  Stop 

3/26/20 at 18:14;  Status DC


Sodium Chloride 90 meq/Potassium Chloride 15 meq/ Potassium Phosphate 18 mmol/ 

Magnesium Sulfate 8 meq/Calcium Gluconate 15 meq/ Multivitamins 10 ml/Chromium/ 

Copper/Manganese/ Seleni/Zn 0.5 ml/ Insulin Human Regular 10 unit/ Total 

Parenteral Nutrition/Amino Acids/Dextrose/ Fat Emulsion Intravenous 1,400 ml @  

58.333 mls/ hr TPN  CONT IV  Last administered on 3/27/20at 21:43;  Start 

3/27/20 at 22:00;  Stop 3/28/20 at 21:59;  Status DC


Lidocaine HCl (Buffered Lidocaine 1%) 3 ml STK-MED ONCE .ROUTE ;  Start 3/25/20 

at 10:00;  Stop 3/27/20 at 13:57;  Status DC


Midazolam HCl 100 mg/Sodium Chloride 100 ml @ 7 mls/hr CONT  PRN IV SEE PROTOCOL

Last administered on 4/8/20at 15:35;  Start 3/28/20 at 16:00;  Stop 6/3/20 at 

14:38;  Status DC


Sodium Chloride 90 meq/Potassium Chloride 15 meq/ Potassium Phosphate 18 mmol/ 

Magnesium Sulfate 8 meq/Calcium Gluconate 15 meq/ Multivitamins 10 ml/Chromium/ 

Copper/Manganese/ Seleni/Zn 0.5 ml/ Insulin Human Regular 15 unit/ Total 

Parenteral Nutrition/Amino Acids/Dextrose/ Fat Emulsion Intravenous 1,400 ml @  

58.333 mls/ hr TPN  CONT IV  Last administered on 3/28/20at 20:34;  Start 

3/28/20 at 22:00;  Stop 3/29/20 at 21:59;  Status DC


Info ([facility] Electrolyte Protocol) 1 ea CONT PRN  PRN MC PER PROTOCOL;  Start 

3/29/20 at 13:15


Sodium Chloride 90 meq/Potassium Chloride 15 meq/ Potassium Phosphate 18 mmol/ 

Magnesium Sulfate 8 meq/Calcium Gluconate 15 meq/ Multivitamins 10 ml/Chromium/ 

Copper/Manganese/ Seleni/Zn 0.5 ml/ Insulin Human Regular 15 unit/ Total 

Parenteral Nutrition/Amino Acids/Dextrose/ Fat Emulsion Intravenous 1,400 ml @  

58.333 mls/ hr TPN  CONT IV  Last administered on 3/29/20at 22:05;  Start 

3/29/20 at 22:00;  Stop 3/30/20 at 21:59;  Status DC


Potassium Chloride 15 meq/ Bicarbonate Dialysis Soln w/ out KCl 5,007.5 ml  @ 

1,000 mls/ hr Q5H1M IV  Last administered on 4/1/20at 18:14;  Start 3/29/20 at 

20:00;  Stop 4/2/20 at 13:08;  Status DC


Potassium Chloride 15 meq/ Bicarbonate Dialysis Soln w/ out KCl 5,007.5 ml  @ 

1,000 mls/ hr Q5H1M IV  Last administered on 4/1/20at 18:14;  Start 3/29/20 at 

20:00;  Stop 4/2/20 at 13:08;  Status DC


Potassium Chloride 15 meq/ Bicarbonate Dialysis Soln w/ out KCl 5,007.5 ml  @ 

1,000 mls/ hr Q5H1M IV  Last administered on 4/1/20at 18:14;  Start 3/29/20 at 

20:00;  Stop 4/2/20 at 13:08;  Status DC


Iohexol (Omnipaque 240 Mg/ml) 30 ml 1X  ONCE PO  Last administered on 3/30/20at 

11:30;  Start 3/30/20 at 11:30;  Stop 3/30/20 at 11:33;  Status DC


Info (CONTRAST GIVEN -- Rx MONITORING) 1 each PRN DAILY  PRN MC SEE COMMENTS;  

Start 3/30/20 at 11:45;  Stop 4/1/20 at 11:44;  Status DC


Sodium Chloride 90 meq/Potassium Chloride 15 meq/ Potassium Phosphate 18 mmol/ 

Magnesium Sulfate 8 meq/Calcium Gluconate 15 meq/ Multivitamins 10 ml/Chromium/ 

Copper/Manganese/ Seleni/Zn 0.5 ml/ Insulin Human Regular 15 unit/ Total 

Parenteral Nutrition/Amino Acids/Dextrose/ Fat Emulsion Intravenous 1,400 ml @  

58.333 mls/ hr TPN  CONT IV  Last administered on 3/30/20at 21:47;  Start 

3/30/20 at 22:00;  Stop 3/31/20 at 21:59;  Status DC


Sodium Chloride 90 meq/Potassium Chloride 15 meq/ Potassium Phosphate 18 mmol/ 

Magnesium Sulfate 8 meq/Calcium Gluconate 15 meq/ Multivitamins 10 ml/Chromium/ 

Copper/Manganese/ Seleni/Zn 0.5 ml/ Insulin Human Regular 20 unit/ Total 

Parenteral Nutrition/Amino Acids/Dextrose/ Fat Emulsion Intravenous 1,400 ml @  

58.333 mls/ hr TPN  CONT IV  Last administered on 3/31/20at 21:36;  Start 

3/31/20 at 22:00;  Stop 4/1/20 at 21:59;  Status DC


Alteplase, Recombinant (Cathflo For Central Catheter Clearance) 1 mg 1X  ONCE 

INT CAT  Last administered on 3/31/20at 20:03;  Start 3/31/20 at 19:30;  Stop 

3/31/20 at 19:46;  Status DC


Alteplase, Recombinant (Cathflo For Central Catheter Clearance) 1 mg 1X  ONCE 

INT CAT  Last administered on 3/31/20at 22:05;  Start 3/31/20 at 22:00;  Stop 

3/31/20 at 22:01;  Status DC


Sodium Chloride 90 meq/Potassium Chloride 15 meq/ Potassium Phosphate 18 mmol/ 

Magnesium Sulfate 8 meq/Calcium Gluconate 15 meq/ Multivitamins 10 ml/Chromium/ 

Copper/Manganese/ Seleni/Zn 0.5 ml/ Insulin Human Regular 20 unit/ Total 

Parenteral Nutrition/Amino Acids/Dextrose/ Fat Emulsion Intravenous 1,400 ml @  

58.333 mls/ hr TPN  CONT IV  Last administered on 4/1/20at 21:30;  Start 4/1/20 

at 22:00;  Stop 4/2/20 at 21:59;  Status DC


Dexmedetomidine HCl 400 mcg/ Sodium Chloride 100 ml @ 0 mls/hr CONT  PRN IV 

ANXIETY / AGITATION Last administered on 5/30/20at 12:57;  Start 4/2/20 at 

08:15;  Stop 5/30/20 at 18:31;  Status DC


Sodium Chloride 500 ml @  500 mls/hr 1X PRN  PRN IV ELEVATED BP, SEE COMMENTS;  

Start 4/2/20 at 08:15


Atropine Sulfate (ATROPINE 0.5mg SYRINGE) 0.5 mg PRN Q5MIN  PRN IV SEE COMMENTS;

 Start 4/2/20 at 08:15


Furosemide (Lasix) 20 mg 1X  ONCE IVP  Last administered on 4/2/20at 08:19;  

Start 4/2/20 at 08:15;  Stop 4/2/20 at 08:16;  Status DC


Lidocaine HCl (Buffered Lidocaine 1%) 3 ml STK-MED ONCE .ROUTE ;  Start 4/2/20 

at 08:39;  Stop 4/2/20 at 08:39;  Status DC


Lidocaine HCl (Buffered Lidocaine 1%) 6 ml 1X  ONCE INJ  Last administered on 

4/2/20at 09:05;  Start 4/2/20 at 09:00;  Stop 4/2/20 at 09:06;  Status DC


Sodium Chloride 90 meq/Potassium Chloride 15 meq/ Potassium Phosphate 18 mmol/ 

Magnesium Sulfate 8 meq/Calcium Gluconate 15 meq/ Multivitamins 10 ml/Chromium/ 

Copper/Manganese/ Seleni/Zn 0.5 ml/ Insulin Human Regular 20 unit/ Total 

Parenteral Nutrition/Amino Acids/Dextrose/ Fat Emulsion Intravenous 1,400 ml @  

58.333 mls/ hr TPN  CONT IV  Last administered on 4/2/20at 22:45;  Start 4/2/20 

at 22:00;  Stop 4/3/20 at 21:59;  Status DC


Sodium Chloride 1,000 ml @  1,000 mls/hr Q1H PRN IV hypotension;  Start 4/3/20 

at 07:30;  Stop 4/3/20 at 13:29;  Status DC


Albumin Human 200 ml @  200 mls/hr 1X PRN  PRN IV Hypotension Last administered 

on 4/3/20at 09:36;  Start 4/3/20 at 07:30;  Stop 4/3/20 at 13:29;  Status DC


Sodium Chloride (Normal Saline Flush) 10 ml 1X PRN  PRN IV AP catheter pack;  

Start 4/3/20 at 07:30;  Stop 4/3/20 at 21:29;  Status DC


Sodium Chloride (Normal Saline Flush) 10 ml 1X PRN  PRN IV  catheter pack;  

Start 4/3/20 at 07:30;  Stop 4/4/20 at 07:29;  Status DC


Sodium Chloride 1,000 ml @  400 mls/hr Q2H30M PRN IV PATENCY;  Start 4/3/20 at 

07:30;  Stop 4/3/20 at 19:29;  Status DC


Info (PHARMACY MONITORING -- do not chart) 1 each PRN DAILY  PRN MC SEE 

COMMENTS;  Start 4/3/20 at 07:30;  Stop 4/3/20 at 13:02;  Status DC


Info (PHARMACY MONITORING -- do not chart) 1 each PRN DAILY  PRN MC SEE 

COMMENTS;  Start 4/3/20 at 07:30;  Stop 4/5/20 at 12:45;  Status DC


Sodium Chloride 90 meq/Potassium Chloride 15 meq/ Potassium Phosphate 10 mmol/ 

Magnesium Sulfate 8 meq/Calcium Gluconate 15 meq/ Multivitamins 10 ml/Chromium/ 

Copper/Manganese/ Seleni/Zn 0.5 ml/ Insulin Human Regular 25 unit/ Total 

Parenteral Nutrition/Amino Acids/Dextrose/ Fat Emulsion Intravenous 1,400 ml @  

58.333 mls/ hr TPN  CONT IV  Last administered on 4/3/20at 22:19;  Start 4/3/20 

at 22:00;  Stop 4/4/20 at 21:59;  Status DC


Heparin Sodium (Porcine) (Heparin Sodium) 5,000 unit Q12HR SQ  Last administered

on 4/26/20at 08:59;  Start 4/3/20 at 21:00;  Stop 4/26/20 at 10:05;  Status DC


Ondansetron HCl (Zofran) 4 mg PRN Q6HRS  PRN IV NAUSEA/VOMITING;  Start 4/6/20 

at 07:00;  Stop 4/7/20 at 06:59;  Status DC


Fentanyl Citrate (Fentanyl 2ml Vial) 25 mcg PRN Q5MIN  PRN IV MILD PAIN 1-3;  

Start 4/6/20 at 07:00;  Stop 4/7/20 at 06:59;  Status DC


Fentanyl Citrate (Fentanyl 2ml Vial) 50 mcg PRN Q5MIN  PRN IV MODERATE TO SEVERE

PAIN;  Start 4/6/20 at 07:00;  Stop 4/7/20 at 06:59;  Status DC


Ringer's Solution 1,000 ml @  30 mls/hr Q24H IV ;  Start 4/6/20 at 07:00;  Stop 

4/6/20 at 18:59;  Status DC


Lidocaine HCl (Xylocaine-Mpf 1% 2ml Vial) 2 ml PRN 1X  PRN ID PRIOR TO IV START;

 Start 4/6/20 at 07:00;  Stop 4/7/20 at 06:59;  Status DC


Prochlorperazine Edisylate (Compazine) 5 mg PACU PRN  PRN IV NAUSEA, MRX1;  

Start 4/6/20 at 07:00;  Stop 4/7/20 at 06:59;  Status DC


Sodium Chloride 1,000 ml @  1,000 mls/hr Q1H PRN IV hypotension;  Start 4/4/20 

at 09:10;  Stop 4/4/20 at 15:09;  Status DC


Albumin Human 200 ml @  200 mls/hr 1X PRN  PRN IV Hypotension Last administered 

on 4/4/20at 10:10;  Start 4/4/20 at 09:15;  Stop 4/4/20 at 15:14;  Status DC


Sodium Chloride 1,000 ml @  400 mls/hr Q2H30M PRN IV PATENCY;  Start 4/4/20 at 

09:10;  Stop 4/4/20 at 21:09;  Status DC


Info (PHARMACY MONITORING -- do not chart) 1 each PRN DAILY  PRN MC SEE 

COMMENTS;  Start 4/4/20 at 09:15;  Stop 4/5/20 at 12:45;  Status DC


Info (PHARMACY MONITORING -- do not chart) 1 each PRN DAILY  PRN MC SEE 

COMMENTS;  Start 4/4/20 at 09:15;  Stop 4/5/20 at 12:45;  Status DC


Sodium Chloride 90 meq/Potassium Chloride 15 meq/ Potassium Phosphate 10 mmol/ 

Magnesium Sulfate 8 meq/Calcium Gluconate 15 meq/ Multivitamins 10 ml/Chromium/ 

Copper/Manganese/ Seleni/Zn 0.5 ml/ Insulin Human Regular 25 unit/ Total 

Parenteral Nutrition/Amino Acids/Dextrose/ Fat Emulsion Intravenous 1,400 ml @  

58.333 mls/ hr TPN  CONT IV  Last administered on 4/4/20at 22:10;  Start 4/4/20 

at 22:00;  Stop 4/5/20 at 21:59;  Status DC


Magnesium Sulfate 50 ml @ 25 mls/hr PRN DAILY  PRN IV for Mag < 1.7 on am labs 

Last administered on 6/18/20at 10:57;  Start 4/5/20 at 09:15


Sodium Chloride 90 meq/Potassium Chloride 15 meq/ Potassium Phosphate 10 mmol/ 

Magnesium Sulfate 8 meq/Calcium Gluconate 15 meq/ Multivitamins 10 ml/Chromium/ 

Copper/Manganese/ Seleni/Zn 0.5 ml/ Insulin Human Regular 25 unit/ Total 

Parenteral Nutrition/Amino Acids/Dextrose/ Fat Emulsion Intravenous 1,400 ml @  

58.333 mls/ hr TPN  CONT IV  Last administered on 4/5/20at 21:20;  Start 4/5/20 

at 22:00;  Stop 4/6/20 at 21:59;  Status DC


Sodium Chloride 1,000 ml @  1,000 mls/hr Q1H PRN IV hypotension;  Start 4/5/20 

at 12:23;  Stop 4/5/20 at 18:22;  Status DC


Albumin Human 200 ml @  200 mls/hr 1X  ONCE IV  Last administered on 4/5/20at 

13:34;  Start 4/5/20 at 12:30;  Stop 4/5/20 at 13:29;  Status DC


Diphenhydramine HCl (Benadryl) 25 mg 1X PRN  PRN IV ITCHING;  Start 4/5/20 at 

12:30;  Stop 4/6/20 at 12:29;  Status DC


Diphenhydramine HCl (Benadryl) 25 mg 1X PRN  PRN IV ITCHING;  Start 4/5/20 at 

12:30;  Stop 4/6/20 at 12:29;  Status DC


Info (PHARMACY MONITORING -- do not chart) 1 each PRN DAILY  PRN MC SEE 

COMMENTS;  Start 4/5/20 at 12:30;  Status Cancel


Bupivacaine HCl/ Epinephrine Bitart (Sensorcain-Epi 0.5%-1:[id number] Mpf) 30 ml 

STK-MED ONCE .ROUTE  Last administered on 4/6/20at 11:44;  Start 4/6/20 at 

11:00;  Stop 4/6/20 at 11:01;  Status DC


Cellulose (Surgicel Fibrillar 1x2) 1 each STK-MED ONCE .ROUTE ;  Start 4/6/20 at

11:00;  Stop 4/6/20 at 11:01;  Status DC


Sodium Chloride 90 meq/Potassium Chloride 15 meq/ Potassium Phosphate 10 mmol/ 

Magnesium Sulfate 12 meq/Calcium Gluconate 15 meq/ Multivitamins 10 ml/Chromium/

Copper/Manganese/ Seleni/Zn 0.5 ml/ Insulin Human Regular 25 unit/ Total 

Parenteral Nutrition/Amino Acids/Dextrose/ Fat Emulsion Intravenous 1,400 ml @  

58.333 mls/ hr TPN  CONT IV  Last administered on 4/6/20at 22:24;  Start 4/6/20 

at 22:00;  Stop 4/7/20 at 21:59;  Status DC


Propofol 20 ml @ As Directed STK-MED ONCE IV ;  Start 4/6/20 at 11:07;  Stop 

4/6/20 at 11:07;  Status DC


Cellulose (Surgicel Hemostat 4x8) 1 each STK-MED ONCE .ROUTE  Last administered 

on 4/6/20at 11:44;  Start 4/6/20 at 11:55;  Stop 4/6/20 at 11:56;  Status DC


Sevoflurane (Ultane) 60 ml STK-MED ONCE IH ;  Start 4/6/20 at 12:46;  Stop 

4/6/20 at 12:46;  Status DC


Sodium Chloride 1,000 ml @  1,000 mls/hr Q1H PRN IV hypotension;  Start 4/6/20 

at 13:51;  Stop 4/6/20 at 19:50;  Status DC


Albumin Human 200 ml @  200 mls/hr 1X PRN  PRN IV Hypotension Last administered 

on 4/6/20at 14:51;  Start 4/6/20 at 14:00;  Stop 4/6/20 at 19:59;  Status DC


Diphenhydramine HCl (Benadryl) 25 mg 1X PRN  PRN IV ITCHING;  Start 4/6/20 at 

14:00;  Stop 4/7/20 at 13:59;  Status DC


Diphenhydramine HCl (Benadryl) 25 mg 1X PRN  PRN IV ITCHING;  Start 4/6/20 at 

14:00;  Stop 4/7/20 at 13:59;  Status DC


Sodium Chloride 1,000 ml @  400 mls/hr Q2H30M PRN IV PATENCY;  Start 4/6/20 at 

13:51;  Stop 4/7/20 at 01:50;  Status DC


Info (PHARMACY MONITORING -- do not chart) 1 each PRN DAILY  PRN MC SEE 

COMMENTS;  Start 4/6/20 at 14:00;  Stop 4/9/20 at 08:16;  Status DC


Heparin Sodium (Porcine) (Hep Lock Adult) 500 unit STK-MED ONCE IVP ;  Start 

4/7/20 at 09:29;  Stop 4/7/20 at 09:30;  Status DC


Sodium Chloride 1,000 ml @  1,000 mls/hr Q1H PRN IV hypotension;  Start 4/7/20 

at 10:43;  Stop 4/7/20 at 16:42;  Status DC


Sodium Chloride 1,000 ml @  400 mls/hr Q2H30M PRN IV PATENCY;  Start 4/7/20 at 

10:43;  Stop 4/7/20 at 22:42;  Status DC


Info (PHARMACY MONITORING -- do not chart) 1 each PRN DAILY  PRN MC SEE 

COMMENTS;  Start 4/7/20 at 10:45;  Status UNV


Info (PHARMACY MONITORING -- do not chart) 1 each PRN DAILY  PRN MC SEE 

COMMENTS;  Start 4/7/20 at 10:45;  Status UNV


Sodium Chloride 90 meq/Potassium Chloride 15 meq/ Magnesium Sulfate 12 

meq/Calcium Gluconate 15 meq/ Multivitamins 10 ml/Chromium/ Copper/Manganese/ 

Seleni/Zn 0.5 ml/ Insulin Human Regular 25 unit/ Total Parenteral 

Nutrition/Amino Acids/Dextrose/ Fat Emulsion Intravenous 1,400 ml @  58.333 mls/

hr TPN  CONT IV  Last administered on 4/7/20at 22:13;  Start 4/7/20 at 22:00;  

Stop 4/8/20 at 21:59;  Status DC


Sodium Chloride 1,000 ml @  1,000 mls/hr Q1H PRN IV hypotension;  Start 4/8/20 

at 07:50;  Stop 4/8/20 at 13:49;  Status DC


Albumin Human 200 ml @  200 mls/hr 1X  ONCE IV ;  Start 4/8/20 at 08:00;  Stop 

4/8/20 at 08:53;  Status DC


Diphenhydramine HCl (Benadryl) 25 mg 1X PRN  PRN IV ITCHING;  Start 4/8/20 at 

08:00;  Stop 4/9/20 at 07:59;  Status DC


Diphenhydramine HCl (Benadryl) 25 mg 1X PRN  PRN IV ITCHING;  Start 4/8/20 at 

08:00;  Stop 4/9/20 at 07:59;  Status DC


Info (PHARMACY MONITORING -- do not chart) 1 each PRN DAILY  PRN MC SEE 

COMMENTS;  Start 4/8/20 at 08:00;  Stop 4/9/20 at 08:16;  Status DC


Albumin Human 50 ml @ 50 mls/hr 1X  ONCE IV ;  Start 4/8/20 at 08:53;  Stop 

4/8/20 at 08:56;  Status DC


Albumin Human 200 ml @  50 mls/hr PRN 1X  PRN IV HYPOTENSION Last administered 

on 4/14/20at 11:54;  Start 4/8/20 at 09:00;  Stop 5/21/20 at 11:14;  Status DC


Meropenem 500 mg/ Sodium Chloride 50 ml @  100 mls/hr Q12H IV  Last administered

on 4/28/20at 10:45;  Start 4/8/20 at 10:00;  Stop 4/28/20 at 12:37;  Status DC


Sodium Chloride 90 meq/Magnesium Sulfate 12 meq/ Calcium Gluconate 15 meq/ 

Multivitamins 10 ml/Chromium/ Copper/Manganese/ Seleni/Zn 0.5 ml/ Insulin Human 

Regular 25 unit/ Total Parenteral Nutrition/Amino Acids/Dextrose/ Fat Emulsion 

Intravenous 1,400 ml @  58.333 mls/ hr TPN  CONT IV  Last administered on 

4/8/20at 21:41;  Start 4/8/20 at 22:00;  Stop 4/9/20 at 21:59;  Status DC


Sodium Chloride 1,000 ml @  1,000 mls/hr Q1H PRN IV hypotension;  Start 4/9/20 

at 07:58;  Stop 4/9/20 at 13:57;  Status DC


Albumin Human 200 ml @  200 mls/hr 1X PRN  PRN IV Hypotension Last administered 

on 4/9/20at 09:30;  Start 4/9/20 at 08:00;  Stop 4/9/20 at 13:59;  Status DC


Sodium Chloride 1,000 ml @  400 mls/hr Q2H30M PRN IV PATENCY;  Start 4/9/20 at 

07:58;  Stop 4/9/20 at 19:57;  Status DC


Info (PHARMACY MONITORING -- do not chart) 1 each PRN DAILY  PRN MC SEE 

COMMENTS;  Start 4/9/20 at 08:00;  Status Cancel


Info (PHARMACY MONITORING -- do not chart) 1 each PRN DAILY  PRN MC SEE 

COMMENTS;  Start 4/9/20 at 08:15;  Status UNV


Sodium Chloride 90 meq/Potassium Phosphate 5 mmol/ Magnesium Sulfate 12 

meq/Calcium Gluconate 15 meq/ Multivitamins 10 ml/Chromium/ Copper/Manganese/ 

Seleni/Zn 0.5 ml/ Insulin Human Regular 30 unit/ Total Parenteral 

Nutrition/Amino Acids/Dextrose/ Fat Emulsion Intravenous 1,400 ml @  58.333 mls/

hr TPN  CONT IV  Last administered on 4/9/20at 22:08;  Start 4/9/20 at 22:00;  

Stop 4/10/20 at 21:59;  Status DC


Linezolid/Dextrose 300 ml @  300 mls/hr Q12HR IV  Last administered on 4/20/20at

20:40;  Start 4/10/20 at 11:00;  Stop 4/21/20 at 08:10;  Status DC


Sodium Chloride 90 meq/Potassium Phosphate 15 mmol/ Magnesium Sulfate 12 

meq/Calcium Gluconate 15 meq/ Multivitamins 10 ml/Chromium/ Copper/Manganese/ 

Seleni/Zn 0.5 ml/ Insulin Human Regular 30 unit/ Total Parenteral Nutri

tion/Amino Acids/Dextrose/ Fat Emulsion Intravenous 1,400 ml @  58.333 mls/ hr 

TPN  CONT IV  Last administered on 4/10/20at 21:49;  Start 4/10/20 at 22:00;  

Stop 4/11/20 at 21:59;  Status DC


Sodium Chloride 90 meq/Potassium Phosphate 15 mmol/ Magnesium Sulfate 12 

meq/Calcium Gluconate 15 meq/ Multivitamins 10 ml/Chromium/ Copper/Manganese/ 

Seleni/Zn 0.5 ml/ Insulin Human Regular 40 unit/ Total Parenteral 

Nutrition/Amino Acids/Dextrose/ Fat Emulsion Intravenous 1,400 ml @  58.333 mls/

hr TPN  CONT IV  Last administered on 4/11/20at 21:21;  Start 4/11/20 at 22:00; 

Stop 4/12/20 at 21:59;  Status DC


Sodium Chloride 1,000 ml @  1,000 mls/hr Q1H PRN IV hypotension;  Start 4/11/20 

at 13:26;  Stop 4/11/20 at 19:25;  Status DC


Albumin Human 200 ml @  200 mls/hr 1X PRN  PRN IV Hypotension Last administered 

on 4/11/20at 15:00;  Start 4/11/20 at 13:30;  Stop 4/11/20 at 19:29;  Status DC


Sodium Chloride (Normal Saline Flush) 10 ml 1X PRN  PRN IV AP catheter pack;  

Start 4/11/20 at 13:30;  Stop 4/12/20 at 13:29;  Status DC


Sodium Chloride (Normal Saline Flush) 10 ml 1X PRN  PRN IV  catheter pack;  

Start 4/11/20 at 13:30;  Stop 4/12/20 at 13:29;  Status DC


Sodium Chloride 1,000 ml @  400 mls/hr Q2H30M PRN IV PATENCY;  Start 4/11/20 at 

13:26;  Stop 4/12/20 at 01:25;  Status DC


Info (PHARMACY MONITORING -- do not chart) 1 each PRN DAILY  PRN MC SEE 

COMMENTS;  Start 4/11/20 at 13:30;  Stop 4/11/20 at 13:33;  Status DC


Info (PHARMACY MONITORING -- do not chart) 1 each PRN DAILY  PRN MC SEE 

COMMENTS;  Start 4/11/20 at 13:30;  Stop 4/11/20 at 13:34;  Status DC


Sodium Chloride 90 meq/Potassium Phosphate 19 mmol/ Magnesium Sulfate 12 

meq/Calcium Gluconate 15 meq/ Multivitamins 10 ml/Chromium/ Copper/Manganese/ 

Seleni/Zn 0.5 ml/ Insulin Human Regular 40 unit/ Total Parenteral Nutrit

ion/Amino Acids/Dextrose/ Fat Emulsion Intravenous 1,400 ml @  58.333 mls/ hr 

TPN  CONT IV  Last administered on 4/12/20at 21:54;  Start 4/12/20 at 22:00;  

Stop 4/13/20 at 21:59;  Status DC


Sodium Chloride 1,000 ml @  1,000 mls/hr Q1H PRN IV hypotension;  Start 4/13/20 

at 09:35;  Stop 4/13/20 at 15:34;  Status DC


Albumin Human 200 ml @  200 mls/hr 1X PRN  PRN IV Hypotension;  Start 4/13/20 at

09:45;  Stop 4/13/20 at 15:44;  Status DC


Diphenhydramine HCl (Benadryl) 25 mg 1X PRN  PRN IV ITCHING;  Start 4/13/20 at 

09:45;  Stop 4/14/20 at 09:44;  Status DC


Diphenhydramine HCl (Benadryl) 25 mg 1X PRN  PRN IV ITCHING;  Start 4/13/20 at 

09:45;  Stop 4/14/20 at 09:44;  Status DC


Sodium Chloride 1,000 ml @  400 mls/hr Q2H30M PRN IV PATENCY;  Start 4/13/20 at 

09:35;  Stop 4/13/20 at 21:34;  Status DC


Info (PHARMACY MONITORING -- do not chart) 1 each PRN DAILY  PRN MC SEE 

COMMENTS;  Start 4/13/20 at 09:45;  Status Cancel


Sodium Chloride 100 meq/Potassium Phosphate 19 mmol/ Magnesium Sulfate 12 

meq/Calcium Gluconate 15 meq/ Multivitamins 10 ml/Chromium/ Copper/Manganese/ 

Seleni/Zn 0.5 ml/ Insulin Human Regular 40 unit/ Potassium Chloride 20 meq/ 

Total Parenteral Nutrition/Amino Acids/Dextrose/ Fat Emulsion Intravenous 1,400 

ml @  58.333 mls/ hr TPN  CONT IV  Last administered on 4/13/20at 22:02;  Start 

4/13/20 at 22:00;  Stop 4/14/20 at 21:59;  Status DC


Furosemide (Lasix) 40 mg 1X  ONCE IVP  Last administered on 4/13/20at 14:39;  

Start 4/13/20 at 14:30;  Stop 4/13/20 at 14:31;  Status DC


Metronidazole 100 ml @  100 mls/hr Q8HRS IV  Last administered on 4/21/20at 

06:04;  Start 4/14/20 at 10:00;  Stop 4/21/20 at 08:10;  Status DC


Sodium Chloride 1,000 ml @  1,000 mls/hr Q1H PRN IV hypotension;  Start 4/14/20 

at 08:00;  Stop 4/14/20 at 13:59;  Status DC


Albumin Human 200 ml @  200 mls/hr 1X PRN  PRN IV Hypotension;  Start 4/14/20 at

08:00;  Stop 4/14/20 at 13:59;  Status DC


Sodium Chloride 1,000 ml @  400 mls/hr Q2H30M PRN IV PATENCY;  Start 4/14/20 at 

08:00;  Stop 4/14/20 at 19:59;  Status DC


Info (PHARMACY MONITORING -- do not chart) 1 each PRN DAILY  PRN MC SEE 

COMMENTS;  Start 4/14/20 at 11:30;  Status UNV


Info (PHARMACY MONITORING -- do not chart) 1 each PRN DAILY  PRN MC SEE 

COMMENTS;  Start 4/14/20 at 11:30;  Stop 4/16/20 at 12:13;  Status DC


Sodium Chloride 100 meq/Potassium Phosphate 19 mmol/ Magnesium Sulfate 12 

meq/Calcium Gluconate 15 meq/ Multivitamins 10 ml/Chromium/ Copper/Manganese/ 

Seleni/Zn 0.5 ml/ Insulin Human Regular 40 unit/ Potassium Chloride 20 meq/ 

Total Parenteral Nutrition/Amino Acids/Dextrose/ Fat Emulsion Intravenous 1,400 

ml @  58.333 mls/ hr TPN  CONT IV  Last administered on 4/14/20at 21:52;  Start 

4/14/20 at 22:00;  Stop 4/15/20 at 21:59;  Status DC


Sodium Chloride (Normal Saline Flush) 10 ml QSHIFT  PRN IV AFTER MEDS AND BLOOD 

DRAWS;  Start 4/14/20 at 15:00;  Stop 5/12/20 at 11:27;  Status DC


Sodium Chloride (Normal Saline Flush) 10 ml PRN Q5MIN  PRN IV AFTER MEDS AND BLO

OD DRAWS;  Start 4/14/20 at 15:00


Sodium Chloride (Normal Saline Flush) 20 ml PRN Q5MIN  PRN IV AFTER MEDS AND 

BLOOD DRAWS;  Start 4/14/20 at 15:00


Sodium Chloride 100 meq/Potassium Phosphate 19 mmol/ Magnesium Sulfate 12 

meq/Calcium Gluconate 15 meq/ Multivitamins 10 ml/Chromium/ Copper/Manganese/ 

Seleni/Zn 0.5 ml/ Insulin Human Regular 40 unit/ Potassium Chloride 20 meq/ 

Total Parenteral Nutrition/Amino Acids/Dextrose/ Fat Emulsion Intravenous 1,400 

ml @  58.333 mls/ hr TPN  CONT IV  Last administered on 4/15/20at 21:20;  Start 

4/15/20 at 22:00;  Stop 4/16/20 at 21:59;  Status DC


Lidocaine HCl (Buffered Lidocaine 1%) 3 ml STK-MED ONCE .ROUTE ;  Start 4/15/20 

at 13:16;  Stop 4/15/20 at 13:16;  Status DC


Lidocaine HCl (Buffered Lidocaine 1%) 6 ml 1X  ONCE INJ  Last administered on 

4/15/20at 13:45;  Start 4/15/20 at 13:30;  Stop 4/15/20 at 13:31;  Status DC


Albumin Human 100 ml @  100 mls/hr 1X  ONCE IV  Last administered on 4/15/20at 

15:41;  Start 4/15/20 at 15:00;  Stop 4/15/20 at 15:59;  Status DC


Albumin Human 50 ml @ 50 mls/hr 1X  ONCE IV  Last administered on 4/15/20at 

15:00;  Start 4/15/20 at 15:00;  Stop 4/15/20 at 15:59;  Status DC


Info (PHARMACY MONITORING -- do not chart) 1 each PRN DAILY  PRN MC SEE 

COMMENTS;  Start 4/16/20 at 11:30;  Status Cancel


Info (PHARMACY MONITORING -- do not chart) 1 each PRN DAILY  PRN MC SEE 

COMMENTS;  Start 4/16/20 at 11:30;  Status UNV


Sodium Chloride 100 meq/Potassium Phosphate 10 mmol/ Magnesium Sulfate 12 

meq/Calcium Gluconate 15 meq/ Multivitamins 10 ml/Chromium/ Copper/Manganese/ 

Seleni/Zn 0.5 ml/ Insulin Human Regular 35 unit/ Potassium Chloride 20 meq/ 

Total Parenteral Nutrition/Amino Acids/Dextrose/ Fat Emulsion Intravenous 1,400 

ml @  58.333 mls/ hr TPN  CONT IV  Last administered on 4/16/20at 22:10;  Start 

4/16/20 at 22:00;  Stop 4/17/20 at 21:59;  Status DC


Sodium Chloride 100 meq/Potassium Phosphate 5 mmol/ Magnesium Sulfate 12 meq/[place]

cium Gluconate 15 meq/ Multivitamins 10 ml/Chromium/ Copper/Manganese/ Seleni/Zn

0.5 ml/ Insulin Human Regular 35 unit/ Potassium Chloride 20 meq/ Total 

Parenteral Nutrition/Amino Acids/Dextrose/ Fat Emulsion Intravenous 1,400 ml @  

58.333 mls/ hr TPN  CONT IV  Last administered on 4/17/20at 22:59;  Start 

4/17/20 at 22:00;  Stop 4/18/20 at 21:59;  Status DC


Sodium Chloride 1,000 ml @  1,000 mls/hr Q1H PRN IV hypotension;  Start 4/18/20 

at 08:27;  Stop 4/18/20 at 14:26;  Status DC


Albumin Human 200 ml @  200 mls/hr 1X PRN  PRN IV Hypotension Last administered 

on 4/18/20at 09:18;  Start 4/18/20 at 08:30;  Stop 4/18/20 at 14:29;  Status DC


Sodium Chloride 1,000 ml @  400 mls/hr Q2H30M PRN IV PATENCY;  Start 4/18/20 at 

08:27;  Stop 4/18/20 at 20:26;  Status DC


Info (PHARMACY MONITORING -- do not chart) 1 each PRN DAILY  PRN MC SEE 

COMMENTS;  Start 4/18/20 at 08:30;  Status Cancel


Info (PHARMACY MONITORING -- do not chart) 1 each PRN DAILY  PRN MC SEE 

COMMENTS;  Start 4/18/20 at 08:30;  Stop 4/26/20 at 13:10;  Status DC


Sodium Chloride 100 meq/Potassium Chloride 40 meq/ Magnesium Sulfate 15 

meq/Calcium Gluconate 15 meq/ Multivitamins 10 ml/Chromium/ Copper/Manganese/ 

Seleni/Zn 0.5 ml/ Insulin Human Regular 35 unit/ Total Parenteral 

Nutrition/Amino Acids/Dextrose/ Fat Emulsion Intravenous 1,400 ml @  58.333 mls/

hr TPN  CONT IV  Last administered on 4/18/20at 22:00;  Start 4/18/20 at 22:00; 

Stop 4/19/20 at 21:59;  Status DC


Potassium Chloride/Water 100 ml @  100 mls/hr 1X  ONCE IV  Last administered on 

4/18/20at 17:28;  Start 4/18/20 at 14:45;  Stop 4/18/20 at 15:44;  Status DC


Sodium Chloride 100 meq/Potassium Chloride 40 meq/ Magnesium Sulfate 15 

meq/Calcium Gluconate 15 meq/ Multivitamins 10 ml/Chromium/ Copper/Manganese/ 

Seleni/Zn 0.5 ml/ Insulin Human Regular 35 unit/ Total Parenteral 

Nutrition/Amino Acids/Dextrose/ Fat Emulsion Intravenous 1,400 ml @  58.333 mls/

hr TPN  CONT IV  Last administered on 4/19/20at 22:46;  Start 4/19/20 at 22:00; 

Stop 4/20/20 at 21:59;  Status DC


Sodium Chloride 100 meq/Potassium Chloride 40 meq/ Magnesium Sulfate 20 

meq/Calcium Gluconate 15 meq/ Multivitamins 10 ml/Chromium/ Copper/Manganese/ 

Seleni/Zn 0.5 ml/ Insulin Human Regular 35 unit/ Total Parenteral 

Nutrition/Amino Acids/Dextrose/ Fat Emulsion Intravenous 1,400 ml @  58.333 mls/

hr TPN  CONT IV  Last administered on 4/20/20at 22:31;  Start 4/20/20 at 22:00; 

Stop 4/21/20 at 21:59;  Status DC


Fentanyl Citrate (Fentanyl 2ml Vial) 50 mcg PRN Q2HR  PRN IVP PAIN Last 

administered on 4/27/20at 13:32;  Start 4/20/20 at 21:00;  Stop 4/28/20 at 

12:53;  Status DC


Fentanyl Citrate (Fentanyl 2ml Vial) 25 mcg PRN Q2HR  PRN IVP PAIN;  Start 

4/20/20 at 21:00;  Stop 4/28/20 at 12:54;  Status DC


Enoxaparin Sodium (Lovenox 100mg Syringe) 100 mg Q12HR SQ ;  Start 4/21/20 at 

21:00;  Status UNV


Amino Acids/ Glycerin/ Electrolytes 1,000 ml @  75 mls/hr [id number] IV ;  Start 

4/20/20 at 21:15;  Status UNV


Sodium Chloride 1,000 ml @  1,000 mls/hr Q1H PRN IV hypotension;  Start 4/21/20 

at 07:56;  Stop 4/21/20 at 13:55;  Status DC


Albumin Human 200 ml @  200 mls/hr 1X PRN  PRN IV Hypotension Last administered 

on 4/21/20at 08:40;  Start 4/21/20 at 08:00;  Stop 4/21/20 at 13:59;  Status DC


Sodium Chloride 1,000 ml @  400 mls/hr Q2H30M PRN IV PATENCY;  Start 4/21/20 at 

07:56;  Stop 4/21/20 at 19:55;  Status DC


Info (PHARMACY MONITORING -- do not chart) 1 each PRN DAILY  PRN MC SEE 

COMMENTS;  Start 4/21/20 at 08:00;  Status UNV


Info (PHARMACY MONITORING -- do not chart) 1 each PRN DAILY  PRN MC SEE 

COMMENTS;  Start 4/21/20 at 08:00;  Status UNV


Daptomycin 430 mg/ Sodium Chloride 50 ml @  100 mls/hr Q24H IV  Last 

administered on 4/21/20at 12:35;  Start 4/21/20 at 09:00;  Stop 4/21/20 at 1

2:49;  Status DC


Sodium Chloride 100 meq/Potassium Chloride 40 meq/ Magnesium Sulfate 20 

meq/Calcium Gluconate 15 meq/ Multivitamins 10 ml/Chromium/ Copper/Manganese/ 

Seleni/Zn 0.5 ml/ Insulin Human Regular 35 unit/ Total Parenteral 

Nutrition/Amino Acids/Dextrose/ Fat Emulsion Intravenous 1,400 ml @  58.333 mls/

hr TPN  CONT IV  Last administered on 4/21/20at 21:26;  Start 4/21/20 at 22:00; 

Stop 4/22/20 at 21:59;  Status DC


Daptomycin 430 mg/ Sodium Chloride 50 ml @  100 mls/hr Q48H IV ;  Start 4/23/20 

at 09:00;  Stop 4/22/20 at 11:55;  Status DC


Sodium Chloride 100 meq/Potassium Chloride 40 meq/ Magnesium Sulfate 20 

meq/Calcium Gluconate 15 meq/ Multivitamins 10 ml/Chromium/ Copper/Manganese/ 

Seleni/Zn 0.5 ml/ Insulin Human Regular 35 unit/ Total Parenteral 

Nutrition/Amino Acids/Dextrose/ Fat Emulsion Intravenous 1,400 ml @  58.333 mls/

hr TPN  CONT IV  Last administered on 4/22/20at 22:27;  Start 4/22/20 at 22:00; 

Stop 4/23/20 at 21:59;  Status DC


Daptomycin 430 mg/ Sodium Chloride 50 ml @  100 mls/hr Q24H IV  Last 

administered on 4/24/20at 15:07;  Start 4/22/20 at 13:00;  Stop 4/25/20 at 

13:15;  Status DC


Sodium Chloride 100 meq/Potassium Chloride 40 meq/ Magnesium Sulfate 20 

meq/Calcium Gluconate 10 meq/ Multivitamins 10 ml/Chromium/ Copper/Manganese/ Se

aram/Zn 0.5 ml/ Insulin Human Regular 35 unit/ Total Parenteral Nutrition/Amino 

Acids/Dextrose/ Fat Emulsion Intravenous 1,400 ml @  58.333 mls/ hr TPN  CONT IV

 Last administered on 4/24/20at 00:06;  Start 4/23/20 at 22:00;  Stop 4/24/20 at

21:59;  Status DC


Alteplase, Recombinant (Cathflo For Central Catheter Clearance) 1 mg 1X  ONCE 

INT CAT  Last administered on 4/24/20at 11:44;  Start 4/24/20 at 10:45;  Stop 

4/24/20 at 10:46;  Status DC


Ondansetron HCl (Zofran) 4 mg PRN Q6HRS  PRN IV NAUSEA/VOMITING;  Start 4/27/20 

at 07:00;  Stop 4/28/20 at 06:59;  Status DC


Fentanyl Citrate (Fentanyl 2ml Vial) 25 mcg PRN Q5MIN  PRN IV MILD PAIN 1-3;  

Start 4/27/20 at 07:00;  Stop 4/28/20 at 06:59;  Status DC


Fentanyl Citrate (Fentanyl 2ml Vial) 50 mcg PRN Q5MIN  PRN IV MODERATE TO SEVERE

PAIN Last administered on 4/27/20at 10:17;  Start 4/27/20 at 07:00;  Stop 

4/28/20 at 06:59;  Status DC


Ringer's Solution 1,000 ml @  30 mls/hr Q24H IV ;  Start 4/27/20 at 07:00;  Stop

4/27/20 at 18:59;  Status DC


Lidocaine HCl (Xylocaine-Mpf 1% 2ml Vial) 2 ml PRN 1X  PRN ID PRIOR TO IV START;

 Start 4/27/20 at 07:00;  Stop 4/28/20 at 06:59;  Status DC


Prochlorperazine Edisylate (Compazine) 5 mg PACU PRN  PRN IV NAUSEA, MRX1;  

Start 4/27/20 at 07:00;  Stop 4/28/20 at 06:59;  Status DC


Sodium Acetate 50 meq/Potassium Acetate 55 meq/ Magnesium Sulfate 20 meq/Calcium

Gluconate 10 meq/ Multivitamins 10 ml/Chromium/ Copper/Manganese/ Seleni/Zn 0.5 

ml/ Insulin Human Regular 35 unit/ Total Parenteral Nutrition/Amino 

Acids/Dextrose/ Fat Emulsion Intravenous 1,400 ml @  58.333 mls/ hr TPN  CONT IV

;  Start 4/24/20 at 22:00;  Stop 4/24/20 at 14:15;  Status DC


Sodium Acetate 50 meq/Potassium Acetate 55 meq/ Magnesium Sulfate 20 meq/Calcium

Gluconate 10 meq/ Multivitamins 10 ml/Chromium/ Copper/Manganese/ Seleni/Zn 0.5 

ml/ Insulin Human Regular 35 unit/ Total Parenteral Nutrition/Amino 

Acids/Dextrose/ Fat Emulsion Intravenous 1,800 ml @  75 mls/hr TPN  CONT IV  

Last administered on 4/24/20at 22:38;  Start 4/24/20 at 22:00;  Stop 4/25/20 at 

21:59;  Status DC


Sodium Chloride 1,000 ml @  1,000 mls/hr Q1H PRN IV hypotension;  Start 4/24/20 

at 15:31;  Stop 4/24/20 at 21:30;  Status DC


Diphenhydramine HCl (Benadryl) 25 mg 1X PRN  PRN IV ITCHING;  Start 4/24/20 at 

15:45;  Stop 4/25/20 at 15:44;  Status DC


Diphenhydramine HCl (Benadryl) 25 mg 1X PRN  PRN IV ITCHING;  Start 4/24/20 at 

15:45;  Stop 4/25/20 at 15:44;  Status DC


Sodium Chloride 1,000 ml @  400 mls/hr Q2H30M PRN IV PATENCY;  Start 4/24/20 at 

15:31;  Stop 4/25/20 at 03:30;  Status DC


Info (PHARMACY MONITORING -- do not chart) 1 each PRN DAILY  PRN MC SEE 

COMMENTS;  Start 4/24/20 at 15:45;  Stop 5/26/20 at 14:14;  Status DC


Sodium Acetate 50 meq/Potassium Acetate 55 meq/ Magnesium Sulfate 20 meq/Calcium

Gluconate 10 meq/ Multivitamins 10 ml/Chromium/ Copper/Manganese/ Seleni/Zn 0.5 

ml/ Insulin Human Regular 35 unit/ Total Parenteral Nutrition/Amino 

Acids/Dextrose/ Fat Emulsion Intravenous 1,800 ml @  75 mls/hr TPN  CONT IV  

Last administered on 4/25/20at 22:03;  Start 4/25/20 at 22:00;  Stop 4/26/20 at 

21:59;  Status DC


Daptomycin 430 mg/ Sodium Chloride 50 ml @  100 mls/hr Q24H IV  Last 

administered on 4/30/20at 13:00;  Start 4/25/20 at 13:00;  Stop 4/30/20 at 

20:58;  Status DC


Heparin Sodium (Porcine) 1000 unit/Sodium Chloride 1,001 ml @  1,001 mls/hr 1X  

ONCE IRR ;  Start 4/27/20 at 06:00;  Stop 4/27/20 at 06:59;  Status DC


Potassium Acetate 55 meq/Magnesium Sulfate 20 meq/ Calcium Gluconate 10 meq/ 

Multivitamins 10 ml/Chromium/ Copper/Manganese/ Seleni/Zn 0.5 ml/ Insulin Human 

Regular 35 unit/ Total Parenteral Nutrition/Amino Acids/Dextrose/ Fat Emulsion 

Intravenous 1,920 ml @  80 mls/hr TPN  CONT IV  Last administered on 4/26/20at 

22:10;  Start 4/26/20 at 22:00;  Stop 4/27/20 at 21:59;  Status DC


Dexamethasone Sodium Phosphate (Decadron) 4 mg STK-MED ONCE .ROUTE ;  Start 

4/27/20 at 10:56;  Stop 4/27/20 at 10:57;  Status DC


Ondansetron HCl (Zofran) 4 mg STK-MED ONCE .ROUTE ;  Start 4/27/20 at 10:56;  

Stop 4/27/20 at 10:57;  Status DC


Rocuronium Bromide (Zemuron) 50 mg STK-MED ONCE .ROUTE ;  Start 4/27/20 at 

10:56;  Stop 4/27/20 at 10:57;  Status DC


Fentanyl Citrate (Fentanyl 2ml Vial) 100 mcg STK-MED ONCE .ROUTE ;  Start 

4/27/20 at 10:56;  Stop 4/27/20 at 10:57;  Status DC


Bupivacaine HCl/ Epinephrine Bitart (Sensorcain-Epi 0.5%-1:641033 Mpf) 30 ml 

STK-MED ONCE .ROUTE  Last administered on 4/27/20at 12:01;  Start 4/27/20 at 

10:58;  Stop 4/27/20 at 10:58;  Status DC


Cellulose (Surgicel Hemostat 2x14) 1 each STK-MED ONCE .ROUTE ;  Start 4/27/20 

at 10:58;  Stop 4/27/20 at 10:59;  Status DC


Iohexol (Omnipaque 300 Mg/ml) 50 ml STK-MED ONCE .ROUTE ;  Start 4/27/20 at 

10:58;  Stop 4/27/20 at 10:59;  Status DC


Cellulose (Surgicel Hemostat 4x8) 1 each STK-MED ONCE .ROUTE ;  Start 4/27/20 at

10:58;  Stop 4/27/20 at 10:59;  Status DC


Bisacodyl (Dulcolax Supp) 10 mg STK-MED ONCE .ROUTE ;  Start 4/27/20 at 10:59;  

Stop 4/27/20 at 10:59;  Status DC


Heparin Sodium (Porcine) 1000 unit/Sodium Chloride 1,001 ml @  1,001 mls/hr 1X  

ONCE IRR ;  Start 4/27/20 at 12:00;  Stop 4/27/20 at 12:59;  Status DC


Propofol 20 ml @ As Directed STK-MED ONCE IV ;  Start 4/27/20 at 11:05;  Stop 

4/27/20 at 11:05;  Status DC


Sevoflurane (Ultane) 90 ml STK-MED ONCE IH ;  Start 4/27/20 at 11:05;  Stop 

4/27/20 at 11:05;  Status DC


Sevoflurane (Ultane) 60 ml STK-MED ONCE IH ;  Start 4/27/20 at 12:26;  Stop 

4/27/20 at 12:27;  Status DC


Propofol 20 ml @ As Directed STK-MED ONCE IV ;  Start 4/27/20 at 12:26;  Stop 

4/27/20 at 12:27;  Status DC


Phenylephrine HCl (PHENYLEPHRINE in 0.9% NACL PF) 1 mg STK-MED ONCE IV ;  Start 

4/27/20 at 12:34;  Stop 4/27/20 at 12:34;  Status DC


Heparin Sodium (Porcine) (Heparin Sodium) 5,000 unit Q12HR SQ  Last administered

on 5/6/20at 20:57;  Start 4/27/20 at 21:00;  Stop 5/7/20 at 09:59;  Status DC


Sodium Chloride (Normal Saline Flush) 3 ml QSHIFT  PRN IV AFTER MEDS AND BLOOD 

DRAWS;  Start 4/27/20 at 13:45;  Status Cancel


Naloxone HCl (Narcan) 0.4 mg PRN Q2MIN  PRN IV SEE INSTRUCTIONS Last administere

d on 6/6/20at 15:15;  Start 4/27/20 at 13:45;  Stop 7/1/20 at 16:00;  Status DC


Sodium Chloride 1,000 ml @  25 mls/hr Q24H IV  Last administered on 5/26/20at 

13:37;  Start 4/27/20 at 13:37;  Stop 5/29/20 at 13:09;  Status DC


Naloxone HCl (Narcan) 0.4 mg PRN Q2MIN  PRN IV SEE INSTRUCTIONS;  Start 4/27/20 

at 14:30;  Status UNV


Sodium Chloride 1,000 ml @  25 mls/hr Q24H IV ;  Start 4/27/20 at 14:30;  Status

UNV


Hydromorphone HCl 30 ml @ 0 mls/hr CONT PRN  PRN IV PER PROTOCOL Last 

administered on 5/2/20at 16:08;  Start 4/27/20 at 14:30;  Stop 5/4/20 at 08:55; 

Status DC


Potassium Acetate 55 meq/Magnesium Sulfate 20 meq/ Calcium Gluconate 10 meq/ 

Multivitamins 10 ml/Chromium/ Copper/Manganese/ Seleni/Zn 0.5 ml/ Insulin Human 

Regular 35 unit/ Total Parenteral Nutrition/Amino Acids/Dextrose/ Fat Emulsion 

Intravenous 1,920 ml @  80 mls/hr TPN  CONT IV  Last administered on 4/27/20at 

22:01;  Start 4/27/20 at 22:00;  Stop 4/28/20 at 21:59;  Status DC


Bumetanide (Bumex) 2 mg [id number] IV  Last administered on 5/1/20at 13:50;  Start 

4/28/20 at 14:00;  Stop 5/2/20 at 14:10;  Status DC


Meropenem 1 gm/ Sodium Chloride 100 ml @  200 mls/hr Q8HRS IV  Last administered

on 5/22/20at 05:53;  Start 4/28/20 at 14:00;  Stop 5/22/20 at 09:31;  Status DC


Potassium Acetate 55 meq/Magnesium Sulfate 20 meq/ Calcium Gluconate 10 meq/ 

Multivitamins 10 ml/Chromium/ Copper/Manganese/ Seleni/Zn 0.5 ml/ Insulin Human 

Regular 35 unit/ Total Parenteral Nutrition/Amino Acids/Dextrose/ Fat Emulsion 

Intravenous 1,920 ml @  80 mls/hr TPN  CONT IV  Last administered on 4/28/20at 

22:02;  Start 4/28/20 at 22:00;  Stop 4/29/20 at 21:59;  Status DC


Hydromorphone HCl (Dilaudid Standard PCA) 12 mg STK-MED ONCE IV ;  Start 4/27/20

at 14:35;  Stop 4/28/20 at 13:53;  Status DC


Artificial Tears (Artificial Tears) 1 drop PRN Q15MIN  PRN OU DRY EYE Last 

administered on 6/23/20at 21:17;  Start 4/29/20 at 05:30


Hydromorphone HCl (Dilaudid Standard PCA) 12 mg STK-MED ONCE IV ;  Start 4/28/20

at 12:05;  Stop 4/29/20 at 09:15;  Status DC


Potassium Acetate 65 meq/Magnesium Sulfate 20 meq/ Calcium Gluconate 10 meq/ 

Multivitamins 10 ml/Chromium/ Copper/Manganese/ Seleni/Zn 0.5 ml/ Insulin Human 

Regular 30 unit/ Total Parenteral Nutrition/Amino Acids/Dextrose/ Fat Emulsion 

Intravenous 1,920 ml @  80 mls/hr TPN  CONT IV  Last administered on 4/29/20at 

22:22;  Start 4/29/20 at 22:00;  Stop 4/30/20 at 21:59;  Status DC


Cyclobenzaprine HCl (Flexeril) 10 mg PRN Q6HRS  PRN PO MUSCLE SPASMS Last 

administered on 7/10/20at 19:12;  Start 4/30/20 at 10:45


Potassium Acetate 55 meq/Magnesium Sulfate 20 meq/ Calcium Gluconate 10 meq/ 

Multivitamins 10 ml/Chromium/ Copper/Manganese/ Seleni/Zn 0.5 ml/ Insulin Human 

Regular 30 unit/ Total Parenteral Nutrition/Amino Acids/Dextrose/ Fat Emulsion 

Intravenous 1,920 ml @  80 mls/hr TPN  CONT IV  Last administered on 5/1/20at 

01:00;  Start 4/30/20 at 22:00;  Stop 5/1/20 at 21:59;  Status DC


Magnesium Sulfate 50 ml @ 25 mls/hr 1X  ONCE IV  Last administered on 4/30/20at 

17:18;  Start 4/30/20 at 12:45;  Stop 4/30/20 at 14:44;  Status DC


Potassium Chloride/Water 100 ml @  100 mls/hr 1X  ONCE IV  Last administered on 

5/1/20at 11:27;  Start 5/1/20 at 12:00;  Stop 5/1/20 at 12:59;  Status DC


Hydromorphone HCl (Dilaudid Standard PCA) 12 mg STK-MED ONCE IV ;  Start 4/29/20

at 10:50;  Stop 5/1/20 at 11:02;  Status DC


Hydromorphone HCl (Dilaudid Standard PCA) 12 mg STK-MED ONCE IV ;  Start 4/30/20

at 13:47;  Stop 5/1/20 at 11:03;  Status DC


Potassium Acetate 30 meq/Magnesium Sulfate 20 meq/ Calcium Gluconate 10 meq/ 

Multivitamins 10 ml/Chromium/ Copper/Manganese/ Seleni/Zn 0.5 ml/ Insulin Human 

Regular 30 unit/ Potassium Chloride 30 meq/ Total Parenteral Nutrition/Amino 

Acids/Dextrose/ Fat Emulsion Intravenous 1,920 ml @  80 mls/hr TPN  CONT IV  

Last administered on 5/1/20at 22:34;  Start 5/1/20 at 22:00;  Stop 5/2/20 at 

21:59;  Status DC


Potassium Chloride/Water 100 ml @  100 mls/hr Q1H IV  Last administered on 

5/2/20at 13:05;  Start 5/2/20 at 07:00;  Stop 5/2/20 at 10:59;  Status DC


Magnesium Sulfate 50 ml @ 25 mls/hr 1X  ONCE IV  Last administered on 5/2/20at 

10:34;  Start 5/2/20 at 10:30;  Stop 5/2/20 at 12:29;  Status DC


Potassium Chloride 75 meq/ Magnesium Sulfate 20 meq/Calcium Gluconate 10 meq/ 

Multivitamins 10 ml/Chromium/ Copper/Manganese/ Seleni/Zn 0.5 ml/ Insulin Human 

Regular 30 unit/ Total Parenteral Nutrition/Amino Acids/Dextrose/ Fat Emulsion 

Intravenous 1,920 ml @  80 mls/hr TPN  CONT IV  Last administered on 5/2/20at 

21:51;  Start 5/2/20 at 22:00;  Stop 5/3/20 at 22:00;  Status DC


Potassium Chloride 75 meq/ Magnesium Sulfate 20 meq/Calcium Gluconate 10 meq/ 

Multivitamins 10 ml/Chromium/ Copper/Manganese/ Seleni/Zn 0.5 ml/ Insulin Human 

Regular 25 unit/ Total Parenteral Nutrition/Amino Acids/Dextrose/ Fat Emulsion 

Intravenous 1,920 ml @  80 mls/hr TPN  CONT IV  Last administered on 5/3/20at 

22:04;  Start 5/3/20 at 22:00;  Stop 5/4/20 at 21:59;  Status DC


Hydromorphone HCl (Dilaudid) 0.4 mg PRN Q4HRS  PRN IVP PAIN Last administered on

5/4/20at 10:57;  Start 5/4/20 at 09:00;  Stop 5/4/20 at 18:59;  Status DC


Micafungin Sodium 100 mg/Dextrose 100 ml @  100 mls/hr Q24H IV  Last 

administered on 5/26/20at 12:17;  Start 5/4/20 at 11:00;  Stop 5/27/20 at 09:59;

 Status DC


Daptomycin 485 mg/ Sodium Chloride 50 ml @  100 mls/hr Q24H IV  Last 

administered on 5/11/20at 13:10;  Start 5/4/20 at 11:00;  Stop 5/12/20 at 07:44;

 Status DC


Potassium Chloride 75 meq/ Magnesium Sulfate 15 meq/Calcium Gluconate 8 meq/ 

Multivitamins 10 ml/Chromium/ Copper/Manganese/ Seleni/Zn 0.5 ml/ Insulin Human 

Regular 25 unit/ Total Parenteral Nutrition/Amino Acids/Dextrose/ Fat Emulsion 

Intravenous 1,920 ml @  80 mls/hr TPN  CONT IV  Last administered on 5/4/20at 

23:08;  Start 5/4/20 at 22:00;  Stop 5/5/20 at 21:59;  Status DC


Haloperidol Lactate (Haldol Inj) 3 mg 1X  ONCE IVP  Last administered on 

5/4/20at 14:37;  Start 5/4/20 at 14:30;  Stop 5/4/20 at 14:31;  Status DC


Hydromorphone HCl (Dilaudid) 1 mg PRN Q4HRS  PRN IVP PAIN Last administered on 

5/18/20at 06:25;  Start 5/4/20 at 19:00;  Stop 5/18/20 at 17:10;  Status DC


Potassium Chloride 75 meq/ Magnesium Sulfate 15 meq/Calcium Gluconate 8 meq/ 

Multivitamins 10 ml/Chromium/ Copper/Manganese/ Seleni/Zn 0.5 ml/ Insulin Human 

Regular 20 unit/ Total Parenteral Nutrition/Amino Acids/Dextrose/ Fat Emulsion 

Intravenous 1,920 ml @  80 mls/hr TPN  CONT IV  Last administered on 5/5/20at 

22:10;  Start 5/5/20 at 22:00;  Stop 5/6/20 at 21:59;  Status DC


Lidocaine HCl (Buffered Lidocaine 1%) 3 ml STK-MED ONCE .ROUTE ;  Start 5/6/20 

at 11:31;  Stop 5/6/20 at 11:31;  Status DC


Lidocaine HCl (Buffered Lidocaine 1%) 3 ml STK-MED ONCE .ROUTE ;  Start 5/6/20 

at 12:28;  Stop 5/6/20 at 12:29;  Status DC


Lidocaine HCl (Buffered Lidocaine 1%) 6 ml 1X  ONCE INJ  Last administered on 

5/6/20at 12:53;  Start 5/6/20 at 12:45;  Stop 5/6/20 at 12:46;  Status DC


Potassium Chloride 75 meq/ Magnesium Sulfate 15 meq/Calcium Gluconate 8 meq/ 

Multivitamins 10 ml/Chromium/ Copper/Manganese/ Seleni/Zn 0.5 ml/ Insulin Human 

Regular 20 unit/ Total Parenteral Nutrition/Amino Acids/Dextrose/ Fat Emulsion 

Intravenous 1,920 ml @  80 mls/hr TPN  CONT IV  Last administered on 5/6/20at 

22:00;  Start 5/6/20 at 22:00;  Stop 5/7/20 at 21:59;  Status DC


Potassium Chloride 75 meq/ Magnesium Sulfate 15 meq/Calcium Gluconate 8 meq/ 

Multivitamins 10 ml/Chromium/ Copper/Manganese/ Seleni/Zn 0.5 ml/ Insulin Human 

Regular 15 unit/ Total Parenteral Nutrition/Amino Acids/Dextrose/ Fat Emulsion 

Intravenous 1,920 ml @  80 mls/hr TPN  CONT IV  Last administered on 5/7/20at 

22:28;  Start 5/7/20 at 22:00;  Stop 5/8/20 at 21:59;  Status DC


Vecuronium Bromide (Norcuron Bolus) 6 mg PRN Q6HRS  PRN IV VENT ASYNCHRONY;  

Start 5/7/20 at 19:15;  Stop 5/7/20 at 19:35;  Status DC


Bumetanide (Bumex) 2 mg 1X  ONCE IV  Last administered on 5/7/20at 22:09;  Start

5/7/20 at 19:45;  Stop 5/7/20 at 19:46;  Status DC


Lidocaine HCl (Buffered Lidocaine 1%) 3 ml STK-MED ONCE .ROUTE ;  Start 5/8/20 

at 07:59;  Stop 5/8/20 at 07:59;  Status DC


Midazolam HCl (Versed) 5 mg STK-MED ONCE .ROUTE ;  Start 5/8/20 at 08:36;  Stop 

5/8/20 at 08:36;  Status DC


Fentanyl Citrate (Fentanyl 5ml Vial) 250 mcg STK-MED ONCE .ROUTE ;  Start 5/8/20

at 08:36;  Stop 5/8/20 at 08:37;  Status DC


Lidocaine HCl (Buffered Lidocaine 1%) 3 ml 1X  ONCE IJ  Last administered on 

5/8/20at 09:30;  Start 5/8/20 at 09:15;  Stop 5/8/20 at 09:16;  Status DC


Midazolam HCl (Versed) 5 mg 1X  ONCE IV  Last administered on 5/8/20at 09:30;  

Start 5/8/20 at 09:15;  Stop 5/8/20 at 09:16;  Status DC


Fentanyl Citrate (Fentanyl 5ml Vial) 250 mcg 1X  ONCE IV  Last administered on 

5/8/20at 09:30;  Start 5/8/20 at 09:15;  Stop 5/8/20 at 09:16;  Status DC


Bumetanide (Bumex) 2 mg DAILY IV  Last administered on 5/18/20at 08:07;  Start 

5/8/20 at 10:00;  Stop 5/18/20 at 17:15;  Status DC


Potassium Chloride 75 meq/ Magnesium Sulfate 15 meq/ Multivitamins 10 

ml/Chromium/ Copper/Manganese/ Seleni/Zn 0.5 ml/ Insulin Human Regular 15 unit/ 

Total Parenteral Nutrition/Amino Acids/Dextrose/ Fat Emulsion Intravenous 1,920 

ml @  80 mls/hr TPN  CONT IV  Last administered on 5/8/20at 21:59;  Start 5/8/20

at 22:00;  Stop 5/9/20 at 21:59;  Status DC


Metoclopramide HCl (Reglan Vial) 10 mg PRN Q3HRS  PRN IVP NAUSEA/VOMITING-3rd 

choice Last administered on 5/14/20at 04:25;  Start 5/9/20 at 16:45


Potassium Chloride 75 meq/ Magnesium Sulfate 15 meq/ Multivitamins 10 

ml/Chromium/ Copper/Manganese/ Seleni/Zn 0.5 ml/ Insulin Human Regular 15 unit/ 

Total Parenteral Nutrition/Amino Acids/Dextrose/ Fat Emulsion Intravenous 1,920 

ml @  80 mls/hr TPN  CONT IV  Last administered on 5/9/20at 22:41;  Start 5/9/20

at 22:00;  Stop 5/10/20 at 21:59;  Status DC


Magnesium Sulfate 50 ml @ 25 mls/hr 1X  ONCE IV  Last administered on 5/10/20at 

10:44;  Start 5/10/20 at 09:00;  Stop 5/10/20 at 10:59;  Status DC


Potassium Chloride/Water 100 ml @  100 mls/hr 1X  ONCE IV  Last administered on 

5/10/20at 09:37;  Start 5/10/20 at 09:00;  Stop 5/10/20 at 09:59;  Status DC


Duloxetine HCl (Cymbalta) 30 mg DAILY PO  Last administered on 5/11/20at 09:48; 

Start 5/10/20 at 14:00;  Stop 5/13/20 at 10:25;  Status DC


Potassium Chloride 80 meq/ Magnesium Sulfate 20 meq/ Multivitamins 10 

ml/Chromium/ Copper/Manganese/ Seleni/Zn 0.5 ml/ Insulin Human Regular 15 unit/ 

Total Parenteral Nutrition/Amino Acids/Dextrose/ Fat Emulsion Intravenous 1,920 

ml @  80 mls/hr TPN  CONT IV  Last administered on 5/10/20at 21:42;  Start 

5/10/20 at 22:00;  Stop 5/11/20 at 21:59;  Status DC


Potassium Chloride 80 meq/ Magnesium Sulfate 20 meq/ Multivitamins 10 

ml/Chromium/ Copper/Manganese/ Seleni/Zn 0.5 ml/ Insulin Human Regular 15 unit/ 

Total Parenteral Nutrition/Amino Acids/Dextrose/ Fat Emulsion Intravenous 1,920 

ml @  80 mls/hr TPN  CONT IV  Last administered on 5/11/20at 22:20;  Start 

5/11/20 at 22:00;  Stop 5/12/20 at 21:59;  Status DC


Lidocaine HCl (Buffered Lidocaine 1%) 3 ml STK-MED ONCE .ROUTE ;  Start 5/12/20 

at 09:54;  Stop 5/12/20 at 09:55;  Status DC


Hydromorphone HCl (Dilaudid Standard PCA) 12 mg STK-MED ONCE IV ;  Start 5/1/20 

at 15:50;  Stop 5/12/20 at 11:24;  Status DC


Potassium Chloride 80 meq/ Magnesium Sulfate 20 meq/ Multivitamins 10 

ml/Chromium/ Copper/Manganese/ Seleni/Zn 0.5 ml/ Insulin Human Regular 15 unit/ 

Total Parenteral Nutrition/Amino Acids/Dextrose/ Fat Emulsion Intravenous 1,920 

ml @  80 mls/hr TPN  CONT IV  Last administered on 5/12/20at 21:40;  Start 

5/12/20 at 22:00;  Stop 5/13/20 at 21:59;  Status DC


Lidocaine HCl (Buffered Lidocaine 1%) 6 ml 1X  ONCE INJ  Last administered on 

5/12/20at 14:15;  Start 5/12/20 at 14:15;  Stop 5/12/20 at 14:16;  Status DC


Potassium Chloride 80 meq/ Magnesium Sulfate 20 meq/ Multivitamins 10 

ml/Chromium/ Copper/Manganese/ Seleni/Zn 1 ml/ Insulin Human Regular 15 unit/ 

Total Parenteral Nutrition/Amino Acids/Dextrose/ Fat Emulsion Intravenous 1,920 

ml @  80 mls/hr TPN  CONT IV  Last administered on 5/13/20at 22:04;  Start 

5/13/20 at 22:00;  Stop 5/14/20 at 21:59;  Status DC


Potassium Chloride/Water 100 ml @  100 mls/hr 1X  ONCE IV  Last administered on 

5/14/20at 11:34;  Start 5/14/20 at 11:00;  Stop 5/14/20 at 11:59;  Status DC


Potassium Chloride 90 meq/ Magnesium Sulfate 20 meq/ Multivitamins 10 

ml/Chromium/ Copper/Manganese/ Seleni/Zn 1 ml/ Insulin Human Regular 15 unit/ 

Total Parenteral Nutrition/Amino Acids/Dextrose/ Fat Emulsion Intravenous 1,920 

ml @  80 mls/hr TPN  CONT IV  Last administered on 5/14/20at 22:57;  Start 

5/14/20 at 22:00;  Stop 5/15/20 at 21:59;  Status DC


Potassium Chloride 90 meq/ Magnesium Sulfate 20 meq/ Multivitamins 10 

ml/Chromium/ Copper/Manganese/ Seleni/Zn 1 ml/ Insulin Human Regular 15 unit/ 

Total Parenteral Nutrition/Amino Acids/Dextrose/ Fat Emulsion Intravenous 1,920 

ml @  80 mls/hr TPN  CONT IV  Last administered on 5/15/20at 22:48;  Start 

5/15/20 at 22:00;  Stop 5/16/20 at 21:59;  Status DC


Potassium Chloride 90 meq/ Magnesium Sulfate 20 meq/ Multivitamins 10 

ml/Chromium/ Copper/Manganese/ Seleni/Zn 1 ml/ Insulin Human Regular 15 unit/ 

Total Parenteral Nutrition/Amino Acids/Dextrose/ Fat Emulsion Intravenous 1,890 

ml @  78.75 mls/ hr TPN  CONT IV  Last administered on 5/16/20at 22:15;  Start 

5/16/20 at 22:00;  Stop 5/17/20 at 21:59;  Status DC


Linezolid/Dextrose 300 ml @  300 mls/hr Q12HR IV  Last administered on 5/19/20at

21:08;  Start 5/17/20 at 09:00;  Stop 5/20/20 at 08:11;  Status DC


Daptomycin 450 mg/ Sodium Chloride 50 ml @  100 mls/hr Q24H IV  Last 

administered on 5/20/20at 09:25;  Start 5/17/20 at 09:00;  Stop 5/21/20 at 

08:30;  Status DC


Potassium Chloride 90 meq/ Magnesium Sulfate 20 meq/ Multivitamins 10 

ml/Chromium/ Copper/Manganese/ Seleni/Zn 1 ml/ Insulin Human Regular 15 unit/ 

Total Parenteral Nutrition/Amino Acids/Dextrose/ Fat Emulsion Intravenous 1,890 

ml @  78.75 mls/ hr TPN  CONT IV  Last administered on 5/17/20at 21:34;  Start 

5/17/20 at 22:00;  Stop 5/18/20 at 21:59;  Status DC


Lorazepam (Ativan Inj) 2 mg STK-MED ONCE .ROUTE ;  Start 5/17/20 at 14:58;  Stop

5/17/20 at 14:58;  Status DC


Metoprolol Tartrate (Lopressor Vial) 5 mg 1X  ONCE IVP  Last administered on 

5/17/20at 15:31;  Start 5/17/20 at 15:15;  Stop 5/17/20 at 15:16;  Status DC


Lorazepam (Ativan Inj) 2 mg 1X  ONCE IVP  Last administered on 5/17/20at 15:30; 

Start 5/17/20 at 15:15;  Stop 5/17/20 at 15:16;  Status DC


Enoxaparin Sodium (Lovenox 40mg Syringe) 40 mg Q24H SQ  Last administered on 

6/5/20at 17:44;  Start 5/17/20 at 17:00;  Stop 6/7/20 at 06:50;  Status DC


Lorazepam (Ativan Inj) 1 mg PRN Q4HRS  PRN IVP ANXIETY / AGITATION MILD-MOD Last

administered on 5/31/20at 15:55;  Start 5/17/20 at 19:15;  Stop 6/2/20 at 11:45;

 Status DC


Lorazepam (Ativan Inj) 2 mg PRN Q4HRS  PRN IVP ANXIETY / AGITATION SEVERE Last 

administered on 6/1/20at 07:55;  Start 5/17/20 at 19:15;  Stop 6/2/20 at 11:45; 

Status DC


Fentanyl Citrate (Fentanyl 2ml Vial) 50 mcg PRN Q4HRS  PRN IVP SEVERE PAIN Last 

administered on 6/13/20at 05:15;  Start 5/18/20 at 13:15;  Stop 6/14/20 at 

09:29;  Status DC


Fentanyl Citrate (Fentanyl 2ml Vial) 25 mcg PRN Q4HRS  PRN IVP MODERATE PAIN 

Last administered on 6/13/20at 00:27;  Start 5/18/20 at 13:15;  Stop 6/14/20 at 

09:30;  Status DC


Potassium Chloride 90 meq/ Magnesium Sulfate 20 meq/ Multivitamins 10 

ml/Chromium/ Copper/Manganese/ Seleni/Zn 1 ml/ Insulin Human Regular 15 unit/ T

otal Parenteral Nutrition/Amino Acids/Dextrose/ Fat Emulsion Intravenous 1,890 

ml @  78.75 mls/ hr TPN  CONT IV  Last administered on 5/18/20at 22:18;  Start 

5/18/20 at 22:00;  Stop 5/19/20 at 21:59;  Status DC


Furosemide (Lasix) 40 mg 1X  ONCE IVP  Last administered on 5/18/20at 21:51;  

Start 5/18/20 at 21:45;  Stop 5/18/20 at 21:48;  Status DC


Albumin Human 100 ml @  100 mls/hr 1X PRN  PRN IV SEE COMMENTS;  Start 5/19/20 

at 01:30


Furosemide (Lasix) 40 mg BID92 IVP  Last administered on 6/3/20at 08:04;  Start 

5/19/20 at 14:00;  Stop 6/3/20 at 13:07;  Status DC


Potassium Chloride 90 meq/ Magnesium Sulfate 20 meq/ Multivitamins 10 ml

/Chromium/ Copper/Manganese/ Seleni/Zn 1 ml/ Insulin Human Regular 15 unit/ 

Total Parenteral Nutrition/Amino Acids/Dextrose/ Fat Emulsion Intravenous 1,800 

ml @  75 mls/hr TPN  CONT IV  Last administered on 5/19/20at 22:31;  Start 

5/19/20 at 22:00;  Stop 5/20/20 at 21:59;  Status DC


Potassium Chloride 90 meq/ Magnesium Sulfate 20 meq/ Multivitamins 10 

ml/Chromium/ Copper/Manganese/ Seleni/Zn 1 ml/ Insulin Human Regular 15 unit/ 

Total Parenteral Nutrition/Amino Acids/Dextrose/ Fat Emulsion Intravenous 1,800 

ml @  75 mls/hr TPN  CONT IV  Last administered on 5/20/20at 22:28;  Start 

5/20/20 at 22:00;  Stop 5/21/20 at 21:59;  Status DC


Potassium Chloride 110 meq/ Magnesium Sulfate 20 meq/ Multivitamins 10 

ml/Chromium/ Copper/Manganese/ Seleni/Zn 1 ml/ Insulin Human Regular 15 unit/ 

Total Parenteral Nutrition/Amino Acids/Dextrose/ Fat Emulsion Intravenous 1,800 

ml @  75 mls/hr TPN  CONT IV  Last administered on 5/21/20at 22:01;  Start 

5/21/20 at 22:00;  Stop 5/22/20 at 21:59;  Status DC


Saliva Substitute (Biotene Moisturizing Mouth) 2 spray PRN Q15MIN  PRN PO DRY 

MOUTH;  Start 5/21/20 at 11:00


Potassium Chloride 110 meq/ Magnesium Sulfate 20 meq/ Multivitamins 10 

ml/Chromium/ Copper/Manganese/ Seleni/Zn 1 ml/ Insulin Human Regular 15 unit/ 

Total Parenteral Nutrition/Amino Acids/Dextrose/ Fat Emulsion Intravenous 1,800 

ml @  75 mls/hr TPN  CONT IV  Last administered on 5/22/20at 22:21;  Start 

5/22/20 at 22:00;  Stop 5/23/20 at 21:59;  Status DC


Potassium Chloride 110 meq/ Magnesium Sulfate 20 meq/ Multivitamins 10 ml

/Chromium/ Copper/Manganese/ Seleni/Zn 1 ml/ Insulin Human Regular 15 unit/ 

Total Parenteral Nutrition/Amino Acids/Dextrose/ Fat Emulsion Intravenous 1,800 

ml @  75 mls/hr TPN  CONT IV  Last administered on 5/23/20at 22:04;  Start 

5/23/20 at 22:00;  Stop 5/24/20 at 21:59;  Status DC


Potassium Chloride 110 meq/ Magnesium Sulfate 20 meq/ Multivitamins 10 

ml/Chromium/ Copper/Manganese/ Seleni/Zn 1 ml/ Insulin Human Regular 15 unit/ 

Total Parenteral Nutrition/Amino Acids/Dextrose/ Fat Emulsion Intravenous 1,800 

ml @  75 mls/hr TPN  CONT IV  Last administered on 5/24/20at 22:48;  Start 

5/24/20 at 22:00;  Stop 5/25/20 at 21:59;  Status DC


Potassium Chloride 70 meq/ Magnesium Sulfate 20 meq/ Multivitamins 10 

ml/Chromium/ Copper/Manganese/ Seleni/Zn 1 ml/ Insulin Human Regular 15 unit/ 

Total Parenteral Nutrition/Amino Acids/Dextrose/ Fat Emulsion Intravenous 1,800 

ml @  75 mls/hr TPN  CONT IV  Last administered on 5/25/20at 21:39;  Start 

5/25/20 at 22:00;  Stop 5/26/20 at 21:59;  Status DC


Meropenem 500 mg/ Sodium Chloride 50 ml @  100 mls/hr Q6HRS IV  Last 

administered on 5/27/20at 06:02;  Start 5/25/20 at 18:00;  Stop 5/27/20 at 

09:59;  Status DC


Barium Sulfate (Varibar Thin Liquid Apple) 148 gm 1X  ONCE PO ;  Start 5/26/20 

at 11:45;  Stop 5/26/20 at 11:49;  Status DC


Potassium Chloride 70 meq/ Magnesium Sulfate 20 meq/ Multivitamins 10 

ml/Chromium/ Copper/Manganese/ Seleni/Zn 1 ml/ Insulin Human Regular 15 unit/ 

Total Parenteral Nutrition/Amino Acids/Dextrose/ Fat Emulsion Intravenous 1,800 

ml @  75 mls/hr TPN  CONT IV  Last administered on 5/26/20at 22:27;  Start 

5/26/20 at 22:00;  Stop 5/27/20 at 21:59;  Status DC


Piperacillin Sod/ Tazobactam Sod 3.375 gm/Sodium Chloride 50 ml @  100 mls/hr 

Q6HRS IV  Last administered on 6/4/20at 06:10;  Start 5/27/20 at 12:00;  Stop 

6/4/20 at 07:26;  Status DC


Potassium Chloride 70 meq/ Magnesium Sulfate 20 meq/ Multivitamins 10 

ml/Chromium/ Copper/Manganese/ Seleni/Zn 1 ml/ Insulin Human Regular 15 unit/ 

Total Parenteral Nutrition/Amino Acids/Dextrose/ Fat Emulsion Intravenous 1,800 

ml @  75 mls/hr TPN  CONT IV  Last administered on 5/27/20at 22:03;  Start 

5/27/20 at 22:00;  Stop 5/28/20 at 21:59;  Status DC


Potassium Chloride 70 meq/ Magnesium Sulfate 20 meq/ Multivitamins 10 

ml/Chromium/ Copper/Manganese/ Seleni/Zn 1 ml/ Insulin Human Regular 15 unit/ 

Total Parenteral Nutrition/Amino Acids/Dextrose/ Fat Emulsion Intravenous 1,800 

ml @  75 mls/hr TPN  CONT IV  Last administered on 5/28/20at 22:33;  Start 

5/28/20 at 22:00;  Stop 5/29/20 at 21:59;  Status DC


Potassium Chloride 70 meq/ Magnesium Sulfate 20 meq/ Multivitamins 10 

ml/Chromium/ Copper/Manganese/ Seleni/Zn 1 ml/ Insulin Human Regular 15 unit/ 

Total Parenteral Nutrition/Amino Acids/Dextrose/ Fat Emulsion Intravenous 1,800 

ml @  75 mls/hr TPN  CONT IV  Last administered on 5/29/20at 23:13;  Start 

5/29/20 at 22:00;  Stop 5/30/20 at 21:59;  Status DC


Potassium Chloride 80 meq/ Magnesium Sulfate 20 meq/ Multivitamins 10 

ml/Chromium/ Copper/Manganese/ Seleni/Zn 1 ml/ Insulin Human Regular 15 unit/ 

Total Parenteral Nutrition/Amino Acids/Dextrose/ Fat Emulsion Intravenous 1,800 

ml @  75 mls/hr TPN  CONT IV  Last administered on 5/30/20at 22:30;  Start 

5/30/20 at 22:00;  Stop 5/31/20 at 21:59;  Status DC


Potassium Chloride 80 meq/ Magnesium Sulfate 20 meq/ Multivitamins 10 

ml/Chromium/ Copper/Manganese/ Seleni/Zn 1 ml/ Insulin Human Regular 15 unit/ 

Total Parenteral Nutrition/Amino Acids/Dextrose/ Fat Emulsion Intravenous 1,800 

ml @  75 mls/hr TPN  CONT IV  Last administered on 5/31/20at 21:54;  Start 

5/31/20 at 22:00;  Stop 6/1/20 at 21:59;  Status DC


Potassium Chloride/Water 100 ml @  100 mls/hr 1X  ONCE IV  Last administered on 

6/1/20at 10:15;  Start 6/1/20 at 10:00;  Stop 6/1/20 at 10:59;  Status DC


Potassium Chloride 90 meq/ Magnesium Sulfate 20 meq/ Multivitamins 10 

ml/Chromium/ Copper/Manganese/ Seleni/Zn 1 ml/ Insulin Human Regular 20 unit/ 

Total Parenteral Nutrition/Amino Acids/Dextrose/ Fat Emulsion Intravenous 1,800 

ml @  75 mls/hr TPN  CONT IV  Last administered on 6/1/20at 22:28;  Start 6/1/20

at 22:00;  Stop 6/2/20 at 21:59;  Status DC


Potassium Chloride 90 meq/ Magnesium Sulfate 20 meq/ Multivitamins 10 

ml/Chromium/ Copper/Manganese/ Seleni/Zn 1 ml/ Insulin Human Regular 20 unit/ 

Total Parenteral Nutrition/Amino Acids/Dextrose/ Fat Emulsion Intravenous 1,800 

ml @  75 mls/hr TPN  CONT IV  Last administered on 6/2/20at 22:08;  Start 6/2/20

at 22:00;  Stop 6/3/20 at 21:59;  Status DC


Lorazepam (Ativan Inj) 0.25 mg PRN Q4HRS  PRN IVP ANXIETY / AGITATION Last 

administered on 7/31/20at 00:47;  Start 6/3/20 at 07:30


Potassium Chloride 90 meq/ Magnesium Sulfate 20 meq/ Multivitamins 10 

ml/Chromium/ Copper/Manganese/ Seleni/Zn 1 ml/ Insulin Human Regular 20 unit/ 

Total Parenteral Nutrition/Amino Acids/Dextrose/ Fat Emulsion Intravenous 1,800 

ml @  75 mls/hr TPN  CONT IV  Last administered on 6/3/20at 23:13;  Start 6/3/20

at 22:00;  Stop 6/4/20 at 21:59;  Status DC


Furosemide (Lasix) 40 mg DAILY IVP  Last administered on 6/5/20at 11:14;  Start 

6/3/20 at 13:30;  Stop 6/7/20 at 09:12;  Status DC


Fluoxetine HCl (PROzac) 20 mg QHS PEG  Last administered on 7/30/20at 20:50;  

Start 6/4/20 at 21:00


Fentanyl (Duragesic 50mcg/ Hr Patch) 1 patch Q72H TD  Last administered on 

6/4/20at 21:22;  Start 6/4/20 at 21:00;  Stop 6/13/20 at 12:00;  Status DC


Potassium Chloride 40 meq/ Potassium Acetate 60 meq/Magnesium Sulfate 10 meq/ 

Multivitamins 10 ml/Chromium/ Copper/Manganese/ Seleni/Zn 1 ml/ Insulin Human 

Regular 20 unit/ Total Parenteral Nutrition/Amino Acids/Dextrose/ Fat Emulsion 

Intravenous 1,800 ml @  75 mls/hr TPN  CONT IV  Last administered on 6/5/20at 

00:03;  Start 6/4/20 at 22:00;  Stop 6/5/20 at 21:59;  Status DC


Potassium Acetate 80 meq/Magnesium Sulfate 5 meq/ Multivitamins 10 ml/Chromium/ 

Copper/Manganese/ Seleni/Zn 1 ml/ Insulin Human Regular 20 unit/ Total 

Parenteral Nutrition/Amino Acids/Dextrose/ Fat Emulsion Intravenous 1,920 ml @  

80 mls/hr TPN  CONT IV  Last administered on 6/5/20at 21:59;  Start 6/5/20 at 22

:00;  Stop 6/6/20 at 21:59;  Status DC


Potassium Acetate 60 meq/Magnesium Sulfate 5 meq/ Multivitamins 10 ml/Chromium/ 

Copper/Manganese/ Seleni/Zn 1 ml/ Insulin Human Regular 30 unit/ Total 

Parenteral Nutrition/Amino Acids/Dextrose/ Fat Emulsion Intravenous 1,920 ml @  

80 mls/hr TPN  CONT IV  Last administered on 6/6/20at 21:54;  Start 6/6/20 at 

22:00;  Stop 6/7/20 at 21:59;  Status DC


Norepinephrine Bitartrate 8 mg/ Dextrose 258 ml @  13.332 mls/ hr CONT  PRN IV 

PER PROTOCOL Last administered on 7/2/20at 09:09;  Start 6/7/20 at 06:30


Albumin Human 500 ml @  125 mls/hr 1X  ONCE IV  Last administered on 6/7/20at 

08:10;  Start 6/7/20 at 08:15;  Stop 6/7/20 at 12:14;  Status DC


Potassium Acetate 40 meq/Magnesium Sulfate 5 meq/ Multivitamins 10 ml/Chromium/ 

Copper/Manganese/ Seleni/Zn 1 ml/ Insulin Human Regular 30 unit/ Total 

Parenteral Nutrition/Amino Acids/Dextrose/ Fat Emulsion Intravenous 1,920 ml @  

80 mls/hr TPN  CONT IV  Last administered on 6/7/20at 22:23;  Start 6/7/20 at 

22:00;  Stop 6/8/20 at 21:59;  Status DC


Meropenem 1 gm/ Sodium Chloride 100 ml @  200 mls/hr Q8HRS IV ;  Start 6/7/20 at

14:00;  Status Cancel


Meropenem 1 gm/ Sodium Chloride 100 ml @  200 mls/hr Q8HRS IV  Last administered

on 6/7/20at 11:04;  Start 6/7/20 at 10:00;  Stop 6/7/20 at 13:00;  Status DC


Meropenem 1 gm/ Sodium Chloride 100 ml @  200 mls/hr Q12HR IV  Last administered

on 6/25/20at 08:27;  Start 6/7/20 at 21:00;  Stop 6/25/20 at 08:56;  Status DC


Sodium Chloride 1,000 ml @  1,000 mls/hr 1X  ONCE IV  Last administered on 

6/7/20at 11:06;  Start 6/7/20 at 10:45;  Stop 6/7/20 at 11:44;  Status DC


Micafungin Sodium 100 mg/Dextrose 100 ml @  100 mls/hr Q24H IV  Last 

administered on 6/24/20at 12:34;  Start 6/7/20 at 11:00;  Stop 6/25/20 at 08:56;

 Status DC


Daptomycin 410 mg/ Sodium Chloride 50 ml @  100 mls/hr Q24H IV  Last 

administered on 6/9/20at 13:33;  Start 6/7/20 at 14:00;  Stop 6/10/20 at 08:30; 

Status DC


Midazolam HCl (Versed) 2 mg STK-MED ONCE .ROUTE ;  Start 6/7/20 at 14:47;  Stop 

6/7/20 at 14:48;  Status DC


Fentanyl Citrate (Fentanyl 2ml Vial) 100 mcg STK-MED ONCE .ROUTE ;  Start 6/7/20

at 14:47;  Stop 6/7/20 at 14:48;  Status DC


Flumazenil (Romazicon) 0.5 mg STK-MED ONCE IV ;  Start 6/7/20 at 14:48;  Stop 

6/7/20 at 14:48;  Status DC


Naloxone HCl (Narcan) 0.4 mg STK-MED ONCE .ROUTE ;  Start 6/7/20 at 14:48;  Stop

6/7/20 at 14:48;  Status DC


Lidocaine HCl (Lidocaine 1% 20ml Vial) 20 ml STK-MED ONCE .ROUTE ;  Start 6/7/20

at 14:48;  Stop 6/7/20 at 14:48;  Status DC


Midazolam HCl (Versed) 2 mg 1X  ONCE IV  Last administered on 6/7/20at 15:28;  

Start 6/7/20 at 15:00;  Stop 6/7/20 at 15:01;  Status DC


Fentanyl Citrate (Fentanyl 2ml Vial) 100 mcg 1X  ONCE IV  Last administered on 

6/7/20at 15:28;  Start 6/7/20 at 15:00;  Stop 6/7/20 at 15:01;  Status DC


Lidocaine HCl (Lidocaine 1% 20ml Vial) 20 ml 1X  ONCE INJ  Last administered on 

6/7/20at 15:30;  Start 6/7/20 at 15:00;  Stop 6/7/20 at 15:01;  Status DC


Sodium Chloride 1,000 ml @  100 mls/hr Q10H IV  Last administered on 6/16/20at 

07:30;  Start 6/7/20 at 20:00;  Stop 6/16/20 at 11:26;  Status DC


Sodium Bicarbonate (Sodium Bicarb Adult 8.4% Syr) 50 meq 1X  ONCE IV  Last 

administered on 6/7/20at 21:47;  Start 6/7/20 at 22:00;  Stop 6/7/20 at 22:01;  

Status DC


Potassium Acetate 40 meq/Magnesium Sulfate 5 meq/ Multivitamins 10 ml/Chromium/ 

Copper/Manganese/ Seleni/Zn 1 ml/ Insulin Human Regular 30 unit/ Total 

Parenteral Nutrition/Amino Acids/Dextrose/ Fat Emulsion Intravenous 1,920 ml @  

80 mls/hr TPN  CONT IV  Last administered on 6/8/20at 22:28;  Start 6/8/20 at 

22:00;  Stop 6/9/20 at 21:59;  Status DC


Sodium Chloride 500 ml @  500 mls/hr 1X  ONCE IV  Last administered on 6/9/20at 

06:39;  Start 6/9/20 at 06:45;  Stop 6/9/20 at 07:44;  Status DC


Potassium Acetate 40 meq/Magnesium Sulfate 5 meq/ Multivitamins 10 ml/Chromium/ 

Copper/Manganese/ Seleni/Zn 1 ml/ Insulin Human Regular 30 unit/ Total 

Parenteral Nutrition/Amino Acids/Dextrose/ Fat Emulsion Intravenous 1,920 ml @  

80 mls/hr TPN  CONT IV  Last administered on 6/9/20at 22:03;  Start 6/9/20 at 

22:00;  Stop 6/10/20 at 21:59;  Status DC


Metoprolol Tartrate (Lopressor Vial) 5 mg PRN Q6HRS  PRN IVP HYPERTENSION Last 

administered on 7/31/20at 11:57;  Start 6/10/20 at 09:00


Potassium Acetate 40 meq/Magnesium Sulfate 5 meq/ Multivitamins 10 ml/Chromium/ 

Copper/Manganese/ Seleni/Zn 1 ml/ Insulin Human Regular 30 unit/ Total 

Parenteral Nutrition/Amino Acids/Dextrose/ Fat Emulsion Intravenous 1,920 ml @  

80 mls/hr TPN  CONT IV  Last administered on 6/10/20at 21:26;  Start 6/10/20 at 

22:00;  Stop 6/11/20 at 21:59;  Status DC


Potassium Acetate 40 meq/Magnesium Sulfate 5 meq/ Multivitamins 10 ml/Chromium/ 

Copper/Manganese/ Seleni/Zn 1 ml/ Insulin Human Regular 30 unit/ Total 

Parenteral Nutrition/Amino Acids/Dextrose/ Fat Emulsion Intravenous 1,920 ml @  

80 mls/hr TPN  CONT IV  Last administered on 6/11/20at 23:23;  Start 6/11/20 at 

22:00;  Stop 6/12/20 at 21:59;  Status DC


Potassium Acetate 40 meq/Magnesium Sulfate 5 meq/ Multivitamins 10 ml/Chromium/ 

Copper/Manganese/ Seleni/Zn 1 ml/ Insulin Human Regular 30 unit/ Total 

Parenteral Nutrition/Amino Acids/Dextrose/ Fat Emulsion Intravenous 1,920 ml @  

80 mls/hr TPN  CONT IV  Last administered on 6/12/20at 21:35;  Start 6/12/20 at 

22:00;  Stop 6/13/20 at 21:59;  Status DC


Furosemide (Lasix) 20 mg 1X  ONCE IVP  Last administered on 6/13/20at 06:26;  

Start 6/13/20 at 06:15;  Stop 6/13/20 at 06:16;  Status DC


Methylprednisolone Sodium Succinate (SOLU-Medrol 125MG VIAL) 125 mg 1X  ONCE IV 

Last administered on 6/13/20at 06:26;  Start 6/13/20 at 06:15;  Stop 6/13/20 at 

06:16;  Status DC


Albuterol/ Ipratropium (Duoneb) 3 ml Q4HRS NEB  Last administered on 7/31/20at 

12:39;  Start 6/13/20 at 08:00


Fentanyl Citrate 30 ml @ 0 mls/hr CONT  PRN IV SEE PROTOCOL Last administered on

7/4/20at 08:03;  Start 6/13/20 at 06:00;  Stop 7/4/20 at 12:42;  Status DC


Propofol 100 ml @ 0 mls/hr CONT  PRN IV SEE PROTOCOL Last administered on 

6/20/20at 23:50;  Start 6/13/20 at 06:00


Fentanyl Citrate (Fentanyl 2ml Vial) 25 mcg PRN Q1HR  PRN IV SEE COMMENTS Last 

administered on 7/29/20at 11:36;  Start 6/13/20 at 06:00


Fentanyl Citrate (Fentanyl 2ml Vial) 50 mcg PRN Q1HR  PRN IV SEE COMMENTS Last 

administered on 7/30/20at 20:50;  Start 6/13/20 at 06:00


Chlorhexidine Gluconate (Peridex) 15 ml BID MM ;  Start 6/13/20 at 09:00;  Stop 

6/13/20 at 07:58;  Status DC


Potassium Acetate 40 meq/Magnesium Sulfate 5 meq/ Multivitamins 10 ml/Chromium/ 

Copper/Manganese/ Seleni/Zn 1 ml/ Insulin Human Regular 30 unit/ Total Lisa

ral Nutrition/Amino Acids/Dextrose/ Fat Emulsion Intravenous 1,920 ml @  80 

mls/hr TPN  CONT IV  Last administered on 6/13/20at 21:19;  Start 6/13/20 at 

22:00;  Stop 6/14/20 at 21:59;  Status DC


Acetylcysteine (Mucomyst 20% Resp Treatment) 600 mg BID NEB  Last administered 

on 6/19/20at 09:33;  Start 6/13/20 at 21:00;  Stop 6/19/20 at 10:39;  Status DC


Magnesium Sulfate 100 ml @  25 mls/hr 1X  ONCE IV  Last administered on 

6/13/20at 15:48;  Start 6/13/20 at 15:45;  Stop 6/13/20 at 19:44;  Status DC


Potassium Acetate 40 meq/Magnesium Sulfate 5 meq/ Multivitamins 10 ml/Chromium/ 

Copper/Manganese/ Seleni/Zn 1 ml/ Insulin Human Regular 30 unit/ Total 

Parenteral Nutrition/Amino Acids/Dextrose/ Fat Emulsion Intravenous 1,920 ml @  

80 mls/hr TPN  CONT IV  Last administered on 6/14/20at 21:35;  Start 6/14/20 at 

22:00;  Stop 6/15/20 at 21:59;  Status DC


Potassium Chloride/Water 100 ml @  100 mls/hr Q1H IV  Last administered on 

6/15/20at 08:31;  Start 6/15/20 at 07:00;  Stop 6/15/20 at 08:59;  Status DC


Potassium Acetate 40 meq/Magnesium Sulfate 5 meq/ Multivitamins 10 ml/Chromium/ 

Copper/Manganese/ Seleni/Zn 1 ml/ Insulin Human Regular 30 unit/ Total 

Parenteral Nutrition/Amino Acids/Dextrose/ Fat Emulsion Intravenous 1,920 ml @  

80 mls/hr TPN  CONT IV  Last administered on 6/15/20at 21:54;  Start 6/15/20 at 

22:00;  Stop 6/16/20 at 19:34;  Status DC


Lidocaine HCl (Buffered Lidocaine 1%) 3 ml STK-MED ONCE .ROUTE ;  Start 6/15/20 

at 12:14;  Stop 6/15/20 at 12:14;  Status DC


Lidocaine HCl (Buffered Lidocaine 1%) 3 ml 1X  ONCE IJ  Last administered on 

6/15/20at 13:11;  Start 6/15/20 at 13:00;  Stop 6/15/20 at 13:01;  Status DC


Magnesium Sulfate 50 ml @ 25 mls/hr 1X  ONCE IV ;  Start 6/16/20 at 08:15;  Stop

6/16/20 at 10:14;  Status DC


Potassium Acetate 40 meq/Magnesium Sulfate 10 meq/ Multivitamins 10 ml/Chromium/

Copper/Manganese/ Seleni/Zn 1 ml/ Insulin Human Regular 20 unit/ Total 

Parenteral Nutrition/Amino Acids/Dextrose/ Fat Emulsion Intravenous 1,920 ml @  

80 mls/hr TPN  CONT IV  Last administered on 6/16/20at 21:32;  Start 6/16/20 at 

22:00;  Stop 6/17/20 at 21:59;  Status DC


Potassium Chloride/Water 100 ml @  100 mls/hr Q1H IV  Last administered on 

6/17/20at 09:12;  Start 6/17/20 at 08:00;  Stop 6/17/20 at 09:59;  Status DC


Alteplase, Recombinant (Cathflo For Central Catheter Clearance) 4 mg 1X  ONCE 

INT CAT ;  Start 6/17/20 at 09:15;  Stop 6/17/20 at 09:16;  Status UNV


Alteplase, Recombinant (Cathflo For Central Catheter Clearance) 4 mg 1X  ONCE 

INT CAT ;  Start 6/17/20 at 09:15;  Stop 6/17/20 at 09:16;  Status UNV


Alteplase, Recombinant (Cathflo For Central Catheter Clearance) 4 mg 1X  ONCE 

INT CAT ;  Start 6/17/20 at 09:15;  Stop 6/17/20 at 09:16;  Status UNV


Alteplase, Recombinant 4 mg/ Sodium Chloride 20 ml @ 20 mls/hr 1X  ONCE IV  Last

administered on 6/17/20at 10:10;  Start 6/17/20 at 10:00;  Stop 6/17/20 at 

10:59;  Status DC


Alteplase, Recombinant 4 mg/ Sodium Chloride 20 ml @ 20 mls/hr 1X  ONCE IV  Last

administered on 6/17/20at 10:09;  Start 6/17/20 at 10:00;  Stop 6/17/20 at 

10:59;  Status DC


Alteplase, Recombinant 4 mg/ Sodium Chloride 20 ml @ 20 mls/hr 1X  ONCE IV  Last

administered on 6/17/20at 10:09;  Start 6/17/20 at 10:00;  Stop 6/17/20 at 

10:59;  Status DC


Potassium Acetate 60 meq/Magnesium Sulfate 10 meq/ Multivitamins 10 ml/Chromium/

Copper/Manganese/ Seleni/Zn 1 ml/ Insulin Human Regular 20 unit/ Total 

Parenteral Nutrition/Amino Acids/Dextrose/ Fat Emulsion Intravenous 1,920 ml @  

80 mls/hr TPN  CONT IV  Last administered on 6/17/20at 21:55;  Start 6/17/20 at 

22:00;  Stop 6/18/20 at 21:59;  Status DC


Albumin Human 500 ml @  125 mls/hr 1X  ONCE IV  Last administered on 6/18/20at 

12:01;  Start 6/18/20 at 11:15;  Stop 6/18/20 at 15:14;  Status DC


Sodium Chloride 500 ml @  500 mls/hr 1X  ONCE IV  Last administered on 6/18/20at

13:50;  Start 6/18/20 at 11:15;  Stop 6/18/20 at 12:14;  Status DC


Potassium Acetate 60 meq/Magnesium Sulfate 14 meq/ Multivitamins 10 ml/Chromium/

Copper/Manganese/ Seleni/Zn 1 ml/ Insulin Human Regular 20 unit/ Total 

Parenteral Nutrition/Amino Acids/Dextrose/ Fat Emulsion Intravenous 1,920 ml @  

80 mls/hr TPN  CONT IV  Last administered on 6/18/20at 22:26;  Start 6/18/20 at 

22:00;  Stop 6/19/20 at 21:59;  Status DC


Ciprofloxacin/ Dextrose 200 ml @  200 mls/hr Q12HR IV  Last administered on 

6/25/20at 08:27;  Start 6/18/20 at 21:00;  Stop 6/25/20 at 08:56;  Status DC


Albumin Human 250 ml @  62.5 mls/hr 1X  ONCE IV  Last administered on 6/19/20at 

11:09;  Start 6/19/20 at 11:00;  Stop 6/19/20 at 14:59;  Status DC


Furosemide (Lasix) 20 mg 1X  ONCE IVP  Last administered on 6/19/20at 14:52;  

Start 6/19/20 at 10:45;  Stop 6/19/20 at 10:49;  Status DC


Potassium Acetate 60 meq/Magnesium Sulfate 14 meq/ Multivitamins 10 ml/Chromium/

Copper/Manganese/ Seleni/Zn 1 ml/ Insulin Human Regular 15 unit/ Total 

Parenteral Nutrition/Amino Acids/Dextrose/ Fat Emulsion Intravenous 1,920 ml @  

80 mls/hr TPN  CONT IV  Last administered on 6/19/20at 22:08;  Start 6/19/20 at 

22:00;  Stop 6/20/20 at 21:59;  Status DC


Potassium Acetate 60 meq/Magnesium Sulfate 14 meq/ Multivitamins 10 ml/Chromium/

Copper/Manganese/ Seleni/Zn 1 ml/ Insulin Human Regular 15 unit/ Total 

Parenteral Nutrition/Amino Acids/Dextrose/ Fat Emulsion Intravenous 1,920 ml @  

80 mls/hr TPN  CONT IV  Last administered on 6/20/20at 22:12;  Start 6/20/20 at 

22:00;  Stop 6/21/20 at 21:59;  Status DC


Potassium Acetate 60 meq/Magnesium Sulfate 14 meq/ Multivitamins 10 ml/Chromium/

Copper/Manganese/ Seleni/Zn 1 ml/ Insulin Human Regular 15 unit/ Total 

Parenteral Nutrition/Amino Acids/Dextrose/ Fat Emulsion Intravenous 1,920 ml @  

80 mls/hr TPN  CONT IV  Last administered on 6/21/20at 22:22;  Start 6/21/20 at 

22:00;  Stop 6/22/20 at 21:59;  Status DC


Furosemide (Lasix) 20 mg 1X  ONCE IVP  Last administered on 6/22/20at 11:07;  

Start 6/22/20 at 10:30;  Stop 6/22/20 at 10:34;  Status DC


Potassium Acetate 60 meq/Magnesium Sulfate 14 meq/ Multivitamins 10 ml/Chromium/

Copper/Manganese/ Seleni/Zn 1 ml/ Insulin Human Regular 15 unit/ Sodium Chloride

20 meq/Total Parenteral Nutrition/Amino Acids/Dextrose/ Fat Emulsion Intravenous

1,920 ml @  80 mls/hr TPN  CONT IV  Last administered on 6/22/20at 21:54;  Start

6/22/20 at 22:00;  Stop 6/23/20 at 21:59;  Status DC


Potassium Acetate 30 meq/Magnesium Sulfate 14 meq/ Multivitamins 10 ml/Chromium/

Copper/Manganese/ Seleni/Zn 1 ml/ Insulin Human Regular 15 unit/ Sodium Chloride

20 meq/Potassium Chloride 30 meq/ Total Parenteral Nutrition/Amino 

Acids/Dextrose/ Fat Emulsion Intravenous 1,920 ml @  80 mls/hr TPN  CONT IV  

Last administered on 6/23/20at 21:46;  Start 6/23/20 at 22:00;  Stop 6/24/20 at 

21:59;  Status DC


Sodium Chloride 80 meq/Potassium Chloride 30 meq/ Potassium Acetate 30 

meq/Magnesium Sulfate 14 meq/ Multivitamins 10 ml/Chromium/ Copper/Manganese/ 

Seleni/Zn 1 ml/ Insulin Human Regular 15 unit/ Total Parenteral Nutrition/Amino 

Acids/Dextrose/ Fat Emulsion Intravenous 1,920 ml @  80 mls/hr TPN  CONT IV  

Last administered on 6/24/20at 22:33;  Start 6/24/20 at 22:00;  Stop 6/25/20 at 

21:59;  Status DC


Furosemide (Lasix) 40 mg 1X  ONCE IVP  Last administered on 6/24/20at 16:27;  

Start 6/24/20 at 15:30;  Stop 6/24/20 at 15:33;  Status DC


Albumin Human 250 ml @  62.5 mls/hr 1X  ONCE IV  Last administered on 6/24/20at 

16:27;  Start 6/24/20 at 15:30;  Stop 6/24/20 at 19:29;  Status DC


Sodium Chloride 80 meq/Potassium Chloride 30 meq/ Potassium Acetate 30 

meq/Magnesium Sulfate 14 meq/ Multivitamins 10 ml/Chromium/ Copper/Manganese/ 

Seleni/Zn 1 ml/ Insulin Human Regular 15 unit/ Total Parenteral Nutrition/Amino 

Acids/Dextrose/ Fat Emulsion Intravenous 1,920 ml @  80 mls/hr TPN  CONT IV  

Last administered on 6/25/20at 22:25;  Start 6/25/20 at 22:00;  Stop 6/26/20 at 

21:59;  Status DC


Sodium Chloride 80 meq/Potassium Chloride 30 meq/ Potassium Acetate 30 m

eq/Magnesium Sulfate 14 meq/ Multivitamins 10 ml/Chromium/ Copper/Manganese/ 

Seleni/Zn 1 ml/ Insulin Human Regular 15 unit/ Total Parenteral Nutrition/Amino 

Acids/Dextrose/ Fat Emulsion Intravenous 1,920 ml @  80 mls/hr TPN  CONT IV  

Last administered on 6/26/20at 21:32;  Start 6/26/20 at 22:00;  Stop 6/27/20 at 

21:59;  Status DC


Sodium Chloride 80 meq/Potassium Chloride 30 meq/ Potassium Acetate 30 

meq/Magnesium Sulfate 14 meq/ Multivitamins 10 ml/Chromium/ Copper/Manganese/ 

Seleni/Zn 1 ml/ Insulin Human Regular 15 unit/ Total Parenteral Nutrition/Amino 

Acids/Dextrose/ Fat Emulsion Intravenous 1,920 ml @  80 mls/hr TPN  CONT IV  La

st administered on 6/27/20at 21:53;  Start 6/27/20 at 22:00;  Stop 6/28/20 at 

21:59;  Status DC


Acetylcysteine (Mucomyst 20% Resp Treatment) 600 mg RTBID NEB  Last administered

on 7/31/20at 08:27;  Start 6/27/20 at 12:00


Sodium Chloride 80 meq/Potassium Chloride 30 meq/ Potassium Acetate 30 

meq/Magnesium Sulfate 14 meq/ Multivitamins 10 ml/Chromium/ Copper/Manganese/ 

Seleni/Zn 1 ml/ Insulin Human Regular 15 unit/ Total Parenteral Nutrition/Amino 

Acids/Dextrose/ Fat Emulsion Intravenous 1,920 ml @  80 mls/hr TPN  CONT IV  

Last administered on 6/28/20at 22:06;  Start 6/28/20 at 22:00;  Stop 6/29/20 at 

21:59;  Status DC


Meropenem 500 mg/ Sodium Chloride 50 ml @  100 mls/hr Q6HRS IV  Last 

administered on 7/21/20at 06:15;  Start 6/28/20 at 18:00;  Stop 7/21/20 at 

08:23;  Status DC


Daptomycin 500 mg/ Sodium Chloride 50 ml @  100 mls/hr Q24H IV  Last 

administered on 7/6/20at 21:47;  Start 6/28/20 at 19:00;  Stop 7/7/20 at 08:13; 

Status DC


Sodium Chloride 80 meq/Potassium Chloride 30 meq/ Potassium Acetate 30 meq/Ma

gnesium Sulfate 14 meq/ Multivitamins 10 ml/Chromium/ Copper/Manganese/ 

Seleni/Zn 1 ml/ Insulin Human Regular 15 unit/ Total Parenteral Nutrition/Amino 

Acids/Dextrose/ Fat Emulsion Intravenous 1,920 ml @  80 mls/hr TPN  CONT IV  

Last administered on 6/29/20at 22:09;  Start 6/29/20 at 22:00;  Stop 6/30/20 at 

21:59;  Status DC


Heparin Sodium (Porcine) 1000 unit/Sodium Chloride 1,001 ml @  1,001 mls/hr 1X  

ONCE IRR ;  Start 6/30/20 at 06:00;  Stop 6/30/20 at 06:59;  Status DC


Propofol (Diprivan) 200 mg STK-MED ONCE IV ;  Start 6/30/20 at 07:44;  Stop 

6/30/20 at 07:44;  Status DC


Lidocaine HCl (Lidocaine Pf 2% Vial) 5 ml STK-MED ONCE .ROUTE ;  Start 6/30/20 

at 07:44;  Stop 6/30/20 at 07:44;  Status DC


Fentanyl Citrate (Fentanyl 2ml Vial) 100 mcg STK-MED ONCE .ROUTE ;  Start 

6/30/20 at 07:44;  Stop 6/30/20 at 07:44;  Status DC


Rocuronium Bromide (Zemuron) 100 mg STK-MED ONCE .ROUTE ;  Start 6/30/20 at 

07:44;  Stop 6/30/20 at 07:44;  Status DC


Micafungin Sodium 100 mg/Dextrose 100 ml @  100 mls/hr Q24H IV  Last adm

inistered on 7/31/20at 08:38;  Start 6/30/20 at 08:30


Bupivacaine HCl/ Epinephrine Bitart (Sensorcain-Epi 0.5%-1:274509 Mpf) 30 ml 

STK-MED ONCE .ROUTE ;  Start 6/30/20 at 08:34;  Stop 6/30/20 at 08:35;  Status 

DC


Iohexol (Omnipaque 300 Mg/ml) 50 ml STK-MED ONCE .ROUTE  Last administered on 

6/30/20at 13:30;  Start 6/30/20 at 08:35;  Stop 6/30/20 at 08:35;  Status DC


Sodium Chloride 80 meq/Potassium Chloride 30 meq/ Potassium Acetate 30 

meq/Magnesium Sulfate 14 meq/ Multivitamins 10 ml/Chromium/ Copper/Manganese/ 

Seleni/Zn 1 ml/ Insulin Human Regular 15 unit/ Total Parenteral Nutrition/Amino 

Acids/Dextrose/ Fat Emulsion Intravenous 1,920 ml @  80 mls/hr TPN  CONT IV  

Last administered on 7/1/20at 01:22;  Start 6/30/20 at 22:00;  Stop 7/1/20 at 

21:59;  Status DC


Phenylephrine HCl (Ken-Synephrine Inj) 10 mg STK-MED ONCE .ROUTE ;  Start 

6/30/20 at 10:15;  Stop 6/30/20 at 10:15;  Status DC


Desflurane (Suprane) 90 ml STK-MED ONCE IH ;  Start 6/30/20 at 10:18;  Stop 

6/30/20 at 10:19;  Status DC


Albumin Human 500 ml @  As Directed STK-MED ONCE IV ;  Start 6/30/20 at 11:06;  

Stop 6/30/20 at 11:06;  Status DC


Vasopressin (Vasostrict) 20 unit STK-MED ONCE .ROUTE ;  Start 6/30/20 at 12:23; 

Stop 6/30/20 at 12:23;  Status DC


Phenylephrine HCl (Ken-Synephrine Inj) 10 mg STK-MED ONCE .ROUTE ;  Start 

6/30/20 at 13:33;  Stop 6/30/20 at 13:33;  Status DC


Phenylephrine HCl (Ken-Synephrine Inj) 10 mg STK-MED ONCE .ROUTE ;  Start 

6/30/20 at 13:33;  Stop 6/30/20 at 13:33;  Status DC


Ondansetron HCl (Zofran) 4 mg STK-MED ONCE .ROUTE ;  Start 6/30/20 at 13:33;  

Stop 6/30/20 at 13:33;  Status DC


Enoxaparin Sodium (Lovenox 40mg Syringe) 40 mg Q24H SQ  Last administered on 

7/31/20at 08:38;  Start 7/1/20 at 08:00


Sodium Chloride (Normal Saline Flush) 3 ml QSHIFT  PRN IV AFTER MEDS AND BLOOD 

DRAWS;  Start 6/30/20 at 14:45


Naloxone HCl (Narcan) 0.4 mg PRN Q2MIN  PRN IV SEE INSTRUCTIONS;  Start 6/30/20 

at 14:45


Sodium Chloride 1,000 ml @  25 mls/hr Q24H IV  Last administered on 7/28/20at 

20:30;  Start 6/30/20 at 14:33


Morphine Sulfate (Morphine Sulfate) 1 mg PRN Q1HR  PRN IV PAIN Last administered

on 7/25/20at 18:28;  Start 6/30/20 at 14:45


Midazolam HCl 100 mg/Sodium Chloride 100 ml @ 1 mls/hr CONT  PRN IV SEE I/O 

RECORD Last administered on 7/3/20at 18:48;  Start 6/30/20 at 14:45


Phenylephrine HCl (PHENYLEPHRINE in 0.9% NACL PF) 1 mg STK-MED ONCE IV ;  Start 

6/30/20 at 14:44;  Stop 6/30/20 at 14:45;  Status DC


Ephedrine Sulfate (ePHEDrine PF IN SALINE SYRINGE) 50 mg STK-MED ONCE IV ;  

Start 6/30/20 at 14:45;  Stop 6/30/20 at 14:45;  Status DC


Vasopressin 20 unit/Dextrose 101 ml @  12 mls/hr CONT  PRN IV SEE I/O RECORD 

Last administered on 7/7/20at 04:17;  Start 6/30/20 at 15:30


Sodium Chloride 1,000 ml @  1,000 mls/hr 1X  ONCE IV  Last administered on 

6/30/20at 15:42;  Start 6/30/20 at 15:45;  Stop 6/30/20 at 16:44;  Status DC


Albumin Human 500 ml @  125 mls/hr 1X  ONCE IV ;  Start 6/30/20 at 16:00;  Stop 

6/30/20 at 19:59;  Status DC


Albumin Human 500 ml @  125 mls/hr PRN Q1HR  PRN IV PER PROTOCOL;  Start 6/30/20

at 15:45


Magnesium Sulfate 50 ml @ 25 mls/hr 1X  ONCE IV  Last administered on 6/30/20at 

17:02;  Start 6/30/20 at 16:30;  Stop 6/30/20 at 18:29;  Status DC


Sodium Bicarbonate (Sodium Bicarb Adult 8.4% Syr) 50 meq STK-MED ONCE .ROUTE ;  

Start 6/30/20 at 16:20;  Stop 6/30/20 at 16:20;  Status DC


Sodium Bicarbonate (Sodium Bicarb Adult 8.4% Syr) 100 meq 1X  ONCE IV  Last 

administered on 6/30/20at 17:07;  Start 6/30/20 at 16:30;  Stop 6/30/20 at 

16:31;  Status DC


Sodium Bicarbonate 150 meq/Dextrose 1,150 ml @  75 mls/hr 1X  ONCE IV  Last 

administered on 6/30/20at 20:02;  Start 6/30/20 at 16:30;  Stop 7/1/20 at 07:49;

 Status DC


Sodium Chloride 80 meq/Potassium Chloride 30 meq/ Potassium Acetate 30 

meq/Magnesium Sulfate 14 meq/ Multivitamins 10 ml/Chromium/ Copper/Manganese/ 

Seleni/Zn 1 ml/ Insulin Human Regular 15 unit/ Total Parenteral Nutrition/Amino 

Acids/Dextrose/ Fat Emulsion Intravenous 1,920 ml @  80 mls/hr TPN  CONT IV  

Last administered on 7/1/20at 23:05;  Start 7/1/20 at 22:00;  Stop 7/2/20 at 

21:59;  Status DC


Sodium Chloride 100 meq/Potassium Chloride 30 meq/ Potassium Acetate 30 

meq/Magnesium Sulfate 12 meq/ Multivitamins 10 ml/Chromium/ Copper/Manganese/ Se

aram/Zn 1 ml/ Insulin Human Regular 15 unit/ Total Parenteral Nutrition/Amino 

Acids/Dextrose/ Fat Emulsion Intravenous 1,920 ml @  80 mls/hr TPN  CONT IV  

Last administered on 7/2/20at 21:52;  Start 7/2/20 at 22:00;  Stop 7/3/20 at 

21:59;  Status DC


Sodium Chloride 100 meq/Potassium Chloride 30 meq/ Potassium Acetate 30 

meq/Magnesium Sulfate 12 meq/ Multivitamins 10 ml/Chromium/ Copper/Manganese/ 

Seleni/Zn 1 ml/ Insulin Human Regular 15 unit/ Total Parenteral Nutrition/Amino 

Acids/Dextrose/ Fat Emulsion Intravenous 1,920 ml @  80 mls/hr TPN  CONT IV  

Last administered on 7/3/20at 21:46;  Start 7/3/20 at 22:00;  Stop 7/4/20 at 

21:59;  Status DC


Sodium Chloride 100 meq/Potassium Chloride 30 meq/ Potassium Acetate 30 

meq/Magnesium Sulfate 12 meq/ Multivitamins 10 ml/Chromium/ Copper/Manganese/ 

Seleni/Zn 1 ml/ Insulin Human Regular 15 unit/ Total Parenteral Nutrition/Amino 

Acids/Dextrose/ Fat Emulsion Intravenous 1,800 ml @  75 mls/hr TPN  CONT IV  

Last administered on 7/4/20at 22:04;  Start 7/4/20 at 22:00;  Stop 7/5/20 at 

21:59;  Status DC


Fentanyl Citrate 55 ml @ 0 mls/hr CONT  PRN IV SEE COMMENTS Last administered on

7/6/20at 23:55;  Start 7/4/20 at 13:00;  Stop 7/9/20 at 17:28;  Status DC


Sodium Chloride 100 meq/Potassium Chloride 30 meq/ Potassium Acetate 30 

meq/Magnesium Sulfate 12 meq/ Multivitamins 10 ml/Chromium/ Copper/Manganese/ 

Seleni/Zn 1 ml/ Insulin Human Regular 15 unit/ Total Parenteral Nutrition/Amino 

Acids/Dextrose/ Fat Emulsion Intravenous 1,680 ml @  70 mls/hr TPN  CONT IV  

Last administered on 7/5/20at 21:23;  Start 7/5/20 at 22:00;  Stop 7/6/20 at 

21:59;  Status DC


Sodium Chloride 110 meq/Potassium Chloride 30 meq/ Potassium Acetate 30 

meq/Magnesium Sulfate 15 meq/ Multivitamins 10 ml/Chromium/ Copper/Manganese/ 

Seleni/Zn 1 ml/ Insulin Human Regular 15 unit/ Total Parenteral Nutrition/Amino 

Acids/Dextrose/ Fat Emulsion Intravenous 1,680 ml @  70 mls/hr TPN  CONT IV  La

st administered on 7/6/20at 21:48;  Start 7/6/20 at 22:00;  Stop 7/7/20 at 

21:59;  Status DC


Sodium Chloride 110 meq/Potassium Chloride 30 meq/ Potassium Acetate 30 

meq/Magnesium Sulfate 15 meq/ Multivitamins 10 ml/Chromium/ Copper/Manganese/ 

Seleni/Zn 1 ml/ Insulin Human Regular 15 unit/ Total Parenteral Nutrition/Amino 

Acids/Dextrose/ Fat Emulsion Intravenous 1,680 ml @  70 mls/hr TPN  CONT IV  

Last administered on 7/7/20at 21:33;  Start 7/7/20 at 22:00;  Stop 7/8/20 at 

21:59;  Status DC


Sodium Chloride 110 meq/Potassium Chloride 30 meq/ Potassium Acetate 30 

meq/Magnesium Sulfate 15 meq/ Multivitamins 10 ml/Chromium/ Copper/Manganese/ 

Seleni/Zn 1 ml/ Insulin Human Regular 15 unit/ Total Parenteral Nutrition/Amino 

Acids/Dextrose/ Fat Emulsion Intravenous 1,680 ml @  70 mls/hr TPN  CONT IV  

Last administered on 7/8/20at 21:51;  Start 7/8/20 at 22:00;  Stop 7/9/20 at 

21:59;  Status DC


Sodium Chloride 90 meq/Potassium Chloride 30 meq/ Potassium Acetate 30 

meq/Magnesium Sulfate 15 meq/ Multivitamins 10 ml/Chromium/ Copper/Manganese/ 

Seleni/Zn 1 ml/ Insulin Human Regular 15 unit/ Total Parenteral Nutrition/Amino 

Acids/Dextrose/ Fat Emulsion Intravenous 1,680 ml @  70 mls/hr TPN  CONT IV  

Last administered on 7/9/20at 22:38;  Start 7/9/20 at 22:00;  Stop 7/10/20 at 

21:59;  Status DC


Fentanyl Citrate 30 ml @ 0 mls/hr CONT  PRN IV SEE I/O RECORD;  Start 7/9/20 at 

17:30


Fentanyl (Duragesic 12mcg/ Hr Patch) 1 patch Q3DAYS TD  Last administered on 

7/31/20at 08:40;  Start 7/10/20 at 09:00


Sodium Chloride 90 meq/Potassium Chloride 30 meq/ Potassium Acetate 30 

meq/Magnesium Sulfate 15 meq/ Multivitamins 10 ml/Chromium/ Copper/Manganese/ 

Seleni/Zn 1 ml/ Insulin Human Regular 15 unit/ Total Parenteral Nutrition/Amino 

Acids/Dextrose/ Fat Emulsion Intravenous 1,680 ml @  70 mls/hr TPN  CONT IV  

Last administered on 7/10/20at 21:59;  Start 7/10/20 at 22:00;  Stop 7/11/20 at 

21:59;  Status DC


Sodium Chloride 90 meq/Potassium Chloride 30 meq/ Potassium Acetate 30 

meq/Magnesium Sulfate 15 meq/ Multivitamins 10 ml/Chromium/ Copper/Manganese/ 

Seleni/Zn 1 ml/ Insulin Human Regular 15 unit/ Total Parenteral Nutrition/Amino 

Acids/Dextrose/ Fat Emulsion Intravenous 1,680 ml @  70 mls/hr TPN  CONT IV  

Last administered on 7/11/20at 21:35;  Start 7/11/20 at 22:00;  Stop 7/12/20 at 

21:59;  Status DC


Vancomycin HCl (Vanco Per Pharmacy) 1 each PRN DAILY  PRN MC SEE COMMENTS Last 

administered on 7/14/20at 02:46;  Start 7/12/20 at 09:15;  Stop 7/15/20 at 

07:41;  Status DC


Ciprofloxacin/ Dextrose 200 ml @  200 mls/hr Q12HR IV  Last administered on 7 /20/20at 21:02;  Start 7/12/20 at 10:00;  Stop 7/21/20 at 08:20;  Status DC


Vancomycin HCl 2 gm/Sodium Chloride 500 ml @  250 mls/hr 1X  ONCE IV  Last 

administered on 7/12/20at 10:34;  Start 7/12/20 at 10:00;  Stop 7/12/20 at 

11:59;  Status DC


Sodium Chloride 90 meq/Potassium Chloride 30 meq/ Potassium Acetate 30 

meq/Magnesium Sulfate 15 meq/ Multivitamins 10 ml/Chromium/ Copper/Manganese/ 

Seleni/Zn 1 ml/ Insulin Human Regular 15 unit/ Total Parenteral Nutrition/Amino 

Acids/Dextrose/ Fat Emulsion Intravenous 1,680 ml @  70 mls/hr TPN  CONT IV  

Last administered on 7/12/20at 22:02;  Start 7/12/20 at 22:00;  Stop 7/13/20 at 

21:59;  Status DC


Diphenhydramine HCl (Benadryl) 25 mg 1X  ONCE IVP  Last administered on 

7/12/20at 14:26;  Start 7/12/20 at 14:30;  Stop 7/12/20 at 14:31;  Status DC


Vancomycin HCl 1.5 gm/Sodium Chloride 500 ml @  250 mls/hr Q8H IV  Last 

administered on 7/13/20at 03:08;  Start 7/12/20 at 18:30;  Stop 7/13/20 at 

12:24;  Status DC


Vancomycin HCl (Vancomycin Trough Level) 1 each 1X  ONCE MC  Last administered 

on 7/13/20at 10:00;  Start 7/13/20 at 10:00;  Stop 7/13/20 at 10:01;  Status DC


Sodium Chloride 90 meq/Potassium Chloride 30 meq/ Potassium Acetate 30 

meq/Magnesium Sulfate 15 meq/ Multivitamins 10 ml/Chromium/ Copper/Manganese/ 

Seleni/Zn 1 ml/ Insulin Human Regular 15 unit/ Total Parenteral Nutrition/Amino 

Acids/Dextrose/ Fat Emulsion Intravenous 1,680 ml @  70 mls/hr TPN  CONT IV  

Last administered on 7/13/20at 22:13;  Start 7/13/20 at 22:00;  Stop 7/14/20 at 

21:59;  Status DC


Vancomycin HCl (Vancomycin Random Level) 1 each 1X  ONCE MC  Last administered 

on 7/14/20at 01:00;  Start 7/14/20 at 01:00;  Stop 7/14/20 at 01:01;  Status DC


Vancomycin HCl 1.5 gm/Sodium Chloride 500 ml @  250 mls/hr Q12H IV  Last 

administered on 7/14/20at 22:07;  Start 7/14/20 at 10:00;  Stop 7/15/20 at 

07:41;  Status DC


Vancomycin HCl (Vancomycin Trough Level) 1 each 1X  ONCE MC ;  Start 7/15/20 at 

09:30;  Stop 7/15/20 at 09:31;  Status Cancel


Sodium Chloride 90 meq/Potassium Chloride 30 meq/ Potassium Acetate 30 m

eq/Magnesium Sulfate 15 meq/ Multivitamins 10 ml/Chromium/ Copper/Manganese/ 

Seleni/Zn 1 ml/ Insulin Human Regular 15 unit/ Total Parenteral Nutrition/Amino 

Acids/Dextrose/ Fat Emulsion Intravenous 1,680 ml @  70 mls/hr TPN  CONT IV  

Last administered on 7/14/20at 22:08;  Start 7/14/20 at 22:00;  Stop 7/15/20 at 

21:59;  Status DC


Alteplase, Recombinant (Cathflo For Central Catheter Clearance) 1 mg 1X  ONCE 

INT CAT  Last administered on 7/14/20at 11:49;  Start 7/14/20 at 11:00;  Stop 

7/14/20 at 11:01;  Status DC


Daptomycin 500 mg/ Sodium Chloride 50 ml @  100 mls/hr Q24H IV  Last 

administered on 7/24/20at 08:25;  Start 7/15/20 at 09:00;  Stop 7/24/20 at 

08:38;  Status DC


Sodium Chloride 90 meq/Potassium Chloride 30 meq/ Potassium Acetate 30 

meq/Magnesium Sulfate 15 meq/ Multivitamins 10 ml/Chromium/ Copper/Manganese/ 

Seleni/Zn 1 ml/ Insulin Human Regular 15 unit/ Total Parenteral Nutrition/Amino 

Acids/Dextrose/ Fat Emulsion Intravenous 1,680 ml @  70 mls/hr TPN  CONT IV  

Last administered on 7/15/20at 22:55;  Start 7/15/20 at 22:00;  Stop 7/16/20 at 

21:59;  Status DC


Sodium Chloride 90 meq/Potassium Chloride 30 meq/ Potassium Acetate 30 

meq/Magnesium Sulfate 15 meq/ Multivitamins 10 ml/Chromium/ Copper/Manganese/ 

Seleni/Zn 1 ml/ Insulin Human Regular 15 unit/ Total Parenteral Nutrition/Amino 

Acids/Dextrose/ Fat Emulsion Intravenous 1,680 ml @  70 mls/hr TPN  CONT IV  

Last administered on 7/16/20at 22:06;  Start 7/16/20 at 22:00;  Stop 7/17/20 at 

21:59;  Status DC


Diphenhydramine HCl (Benadryl) 50 mg STK-MED ONCE .ROUTE ;  Start 7/16/20 at 

18:34;  Stop 7/16/20 at 18:35;  Status DC


Diphenhydramine HCl (Benadryl) 25 mg 1X  ONCE IM ;  Start 7/16/20 at 18:45;  

Stop 7/16/20 at 18:46;  Status DC


Diphenhydramine HCl (Benadryl) 25 mg 1X  ONCE IVP  Last administered on 7/1 6/20at 18:56;  Start 7/16/20 at 19:00;  Stop 7/16/20 at 19:01;  Status DC


Alprazolam (Xanax) 0.5 mg PRN TID  PRN PO ANXIETY / AGITATION Last administered 

on 7/23/20at 11:49;  Start 7/17/20 at 08:00;  Stop 7/23/20 at 15:54;  Status DC


Sodium Chloride 110 meq/Potassium Chloride 30 meq/ Potassium Acetate 30 

meq/Magnesium Sulfate 15 meq/ Multivitamins 10 ml/Chromium/ Copper/Manganese/ 

Seleni/Zn 1 ml/ Insulin Human Regular 15 unit/ Total Parenteral Nutrition/Amino 

Acids/Dextrose/ Fat Emulsion Intravenous 1,680 ml @  70 mls/hr TPN  CONT IV  

Last administered on 7/17/20at 21:21;  Start 7/17/20 at 22:00;  Stop 7/18/20 at 

21:59;  Status DC


Sodium Chloride 110 meq/Potassium Chloride 30 meq/ Potassium Acetate 30 

meq/Magnesium Sulfate 15 meq/ Multivitamins 10 ml/Chromium/ Copper/Manganese/ 

Seleni/Zn 1 ml/ Insulin Human Regular 15 unit/ Total Parenteral Nutrition/Amino 

Acids/Dextrose/ Fat Emulsion Intravenous 1,680 ml @  70 mls/hr TPN  CONT IV  

Last administered on 7/18/20at 22:01;  Start 7/18/20 at 22:00;  Stop 7/19/20 at 

21:59;  Status DC


Alteplase, Recombinant (Cathflo For Central Catheter Clearance) 1 mg 1X  ONCE 

INT CAT  Last administered on 7/19/20at 08:23;  Start 7/19/20 at 08:15;  Stop 

7/19/20 at 08:16;  Status DC


Sodium Chloride 110 meq/Sodium Phosphate 10 mmol/ Potassium Chloride 30 meq/ 

Potassium Acetate 30 meq/Magnesium Sulfate 15 meq/ Multivitamins 10 ml/Chromium/

Copper/Manganese/ Seleni/Zn 1 ml/ Insulin Human Regular 15 unit/ Total 

Parenteral Nutrition/Amino Acids/Dextrose/ Fat Emulsion Intravenous 1,680 ml @  

70 mls/hr TPN  CONT IV  Last administered on 7/19/20at 22:03;  Start 7/19/20 at 

22:00;  Stop 7/20/20 at 21:59;  Status DC


Sodium Chloride 120 meq/Sodium Phosphate 10 mmol/ Potassium Chloride 30 meq/ 

Potassium Acetate 30 meq/Magnesium Sulfate 15 meq/ Multivitamins 10 ml/Chromium/

Copper/Manganese/ Seleni/Zn 1 ml/ Insulin Human Regular 15 unit/ Total 

Parenteral Nutrition/Amino Acids/Dextrose/ Fat Emulsion Intravenous 1,680 ml @  

70 mls/hr TPN  CONT IV  Last administered on 7/20/20at 22:08;  Start 7/20/20 at 

22:00;  Stop 7/21/20 at 21:59;  Status DC


Ceftazidime/ Avibactam 2.5 gm/ Sodium Chloride 100 ml @  50 mls/hr Q8HRS IV  

Last administered on 7/22/20at 05:32;  Start 7/21/20 at 14:00;  Stop 7/22/20 at 

07:48;  Status DC


Alteplase, Recombinant (Cathflo For Central Catheter Clearance) 1 mg 1X  ONCE 

INT CAT  Last administered on 7/21/20at 09:30;  Start 7/21/20 at 09:30;  Stop 

7/21/20 at 09:31;  Status DC


Sodium Chloride 120 meq/Sodium Phosphate 10 mmol/ Potassium Chloride 30 meq/ 

Potassium Acetate 30 meq/Magnesium Sulfate 15 meq/ Multivitamins 10 ml/Chromium/

Copper/Manganese/ Seleni/Zn 1 ml/ Insulin Human Regular 15 unit/ Total Parenter

al Nutrition/Amino Acids/Dextrose/ Fat Emulsion Intravenous 1,680 ml @  70 

mls/hr TPN  CONT IV  Last administered on 7/21/20at 22:11;  Start 7/21/20 at 

22:00;  Stop 7/22/20 at 21:59;  Status DC


Iohexol (Omnipaque 300 Mg/ml) 75 ml 1X  ONCE IV  Last administered on 7/21/20at 

11:30;  Start 7/21/20 at 11:30;  Stop 7/21/20 at 11:31;  Status DC


Info (CONTRAST GIVEN -- Rx MONITORING) 1 each PRN DAILY  PRN MC SEE COMMENTS;  

Start 7/21/20 at 11:45;  Stop 7/23/20 at 11:44;  Status DC


Alteplase, Recombinant (Cathflo For Central Catheter Clearance) 1 mg 1X  ONCE 

INT CAT  Last administered on 7/21/20at 12:17;  Start 7/21/20 at 12:00;  Stop 

7/21/20 at 12:01;  Status DC


Cefepime HCl (Maxipime) 2 gm Q12HR IVP  Last administered on 7/22/20at 20:53;  

Start 7/22/20 at 09:00;  Stop 7/23/20 at 07:30;  Status DC


Sodium Chloride 120 meq/Sodium Phosphate 10 mmol/ Potassium Chloride 30 meq/ 

Potassium Acetate 30 meq/Magnesium Sulfate 15 meq/ Multivitamins 10 ml/Chromium/

Copper/Manganese/ Seleni/Zn 1 ml/ Insulin Human Regular 15 unit/ Total 

Parenteral Nutrition/Amino Acids/Dextrose/ Fat Emulsion Intravenous 1,680 ml @  

70 mls/hr TPN  CONT IV  Last administered on 7/22/20at 21:25;  Start 7/22/20 at 

22:00;  Stop 7/23/20 at 21:59;  Status DC


Ceftazidime/ Avibactam 2.5 gm/ Sodium Chloride 250 ml @  125 mls/hr Q8HRS IV  

Last administered on 7/31/20at 05:02;  Start 7/23/20 at 08:00


Sodium Chloride 120 meq/Sodium Phosphate 10 mmol/ Potassium Chloride 30 meq/ 

Potassium Acetate 30 meq/Magnesium Sulfate 15 meq/ Multivitamins 10 ml/Chromium/

Copper/Manganese/ Seleni/Zn 1 ml/ Insulin Human Regular 15 unit/ Total 

Parenteral Nutrition/Amino Acids/Dextrose/ Fat Emulsion Intravenous 1,680 ml @  

70 mls/hr TPN  CONT IV  Last administered on 7/23/20at 22:35;  Start 7/23/20 at 

22:00;  Stop 7/24/20 at 21:59;  Status DC


Alprazolam (Xanax) 0.5 mg PRN QID  PRN PO ANXIETY / AGITATION Last administered 

on 7/27/20at 14:21;  Start 7/23/20 at 16:00


Acetaminophen/ Hydrocodone Bitart (Lortab 5/325) 1 tab PRN Q4HRS  PRN PO PAIN 

Last administered on 7/24/20at 19:34;  Start 7/23/20 at 16:00


Sodium Chloride 120 meq/Sodium Phosphate 10 mmol/ Potassium Chloride 30 meq/ 

Potassium Acetate 30 meq/Magnesium Sulfate 15 meq/ Multivitamins 10 ml/Chromium/

Copper/Manganese/ Seleni/Zn 1 ml/ Insulin Human Regular 15 unit/ Total Lisa

ral Nutrition/Amino Acids/Dextrose/ Fat Emulsion Intravenous 1,680 ml @  70 

mls/hr TPN  CONT IV  Last administered on 7/24/20at 21:50;  Start 7/24/20 at 

22:00;  Stop 7/25/20 at 21:59;  Status DC


Sodium Chloride 120 meq/Sodium Phosphate 10 mmol/ Potassium Chloride 30 meq/ 

Potassium Acetate 30 meq/Magnesium Sulfate 15 meq/ Multivitamins 10 ml/Chromium/

Copper/Manganese/ Seleni/Zn 1 ml/ Insulin Human Regular 15 unit/ Total 

Parenteral Nutrition/Amino Acids/Dextrose/ Fat Emulsion Intravenous 1,680 ml @  

70 mls/hr TPN  CONT IV  Last administered on 7/25/20at 22:14;  Start 7/25/20 at 

22:00;  Stop 7/26/20 at 21:59;  Status DC


Daptomycin 500 mg/ Sodium Chloride 50 ml @  100 mls/hr Q24H IV  Last 

administered on 7/31/20at 09:53;  Start 7/26/20 at 09:00


Sodium Chloride 120 meq/Sodium Phosphate 10 mmol/ Potassium Chloride 30 meq/ 

Potassium Acetate 30 meq/Magnesium Sulfate 15 meq/ Multivitamins 10 ml/Chromium/

Copper/Manganese/ Seleni/Zn 1 ml/ Insulin Human Regular 15 unit/ Total 

Parenteral Nutrition/Amino Acids/Dextrose/ Fat Emulsion Intravenous 1,680 ml @  

70 mls/hr TPN  CONT IV ;  Start 7/26/20 at 22:00;  Stop 7/27/20 at 21:59;  

Status Cancel


Amino Acids/ Glycerin/ Electrolytes 1,000 ml @  80 mls/hr [id number] IV  Last 

administered on 7/31/20at 02:45;  Start 7/26/20 at 10:15


Iohexol (Omnipaque 300 Mg/ml) 50 ml 1X  ONCE IJ ;  Start 7/28/20 at 11:00;  Stop

7/28/20 at 11:01;  Status DC


Sodium Chloride 500 ml @  500 mls/hr 1X  ONCE IV  Last administered on 7/28/20at

18:30;  Start 7/28/20 at 18:30;  Stop 7/28/20 at 19:29;  Status DC


Lidocaine HCl (Buffered Lidocaine 1%) 3 ml 1X  ONCE INJ ;  Start 7/30/20 at 

12:15;  Stop 7/30/20 at 12:17;  Status DC





Active Scripts


Active


Reported


Bisoprolol Fumarate 5 Mg Tablet 10 Mg PO DAILY


Vitals/I & O





Vital Sign - Last 24 Hours








 7/30/20 7/30/20 7/30/20 7/30/20





 15:00 16:03 19:00 20:00


 


Temp 98.3  98.4 





 98.3  98.4 


 


Pulse 141  128 


 


Resp 34  22 


 


B/P (MAP) 137/82 (100)  144/85 (104) 


 


Pulse Ox 95 96 95 


 


O2 Delivery Room Air Nasal Cannula Room Air Room Air


 


O2 Flow Rate  2.0  


 


    





    





 7/30/20 7/30/20 7/30/20 7/30/20





 20:49 20:50 21:20 23:04


 


Temp    97.4





    97.4


 


Pulse    118


 


Resp  22 22 27


 


B/P (MAP)    157/82 (107)


 


Pulse Ox 97 95 95 96


 


O2 Delivery Room Air Room Air Room Air Room Air


 


O2 Flow Rate  2.0 2.0 


 


    





    





 7/31/20 7/31/20 7/31/20 7/31/20





 00:36 03:00 03:45 07:20


 


Temp  97.1  98.5





  97.1  98.5


 


Pulse  112  112


 


Resp  22  20


 


B/P (MAP)  127/80 (96)  132/78 (96)


 


Pulse Ox 94 96  96


 


O2 Delivery Room Air Room Air Room Air Room Air


 


    





    





 7/31/20 7/31/20 7/31/20 7/31/20





 08:00 08:28 08:40 11:00


 


Temp    98.7





    98.7


 


Pulse    125


 


Resp    20


 


B/P (MAP)    137/85 (102)


 


Pulse Ox  98 96 99


 


O2 Delivery Room Air Room Air Room Air Room Air


 


    





    





 7/31/20 7/31/20 7/31/20 





 11:57 12:03 12:40 


 


Pulse 152   


 


Resp   26 


 


B/P (MAP) 137/85   


 


Pulse Ox  94  


 


O2 Delivery  Room Air Room Air 














Intake and Output   


 


 7/30/20 7/30/20 7/31/20





 15:00 23:00 07:00


 


Intake Total 500 ml 1279 ml 464 ml


 


Output Total 825 ml 325 ml 460 ml


 


Balance -325 ml 954 ml 4 ml








Problem List


Problems


Medical Problems:


(1) Acute pancreatitis


Status: Acute  





(2) Cholelithiasis


Status: Acute  








Assessment


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


Plan of Care:  Continue current Tx, Mgmt





Justicifation of Admission Dx:


Justifications for Admission:


Justification of Admission Dx:  Yes











MARCO ANTONIO LONGO MD          Jul 31, 2020 13:54

## 2020-07-31 NOTE — NUR
SS following up wit hdsicharge planning. SS reviewed pt chart and discussed with pt RN. Pt 
is currently on room air. Pt has ROBERT drain x1 and Avi drain x1. Pt has GJ tube. Per RN, 
pt on PPN and tube feeds. Pt on IV Daptomycin, IV Avycaz, and IV Micafungin. Pt has wound 
vac on midline incision. Pt Max Assist with PT/OT and staff. Pt remains self pay. Med Assist 
attempting to work with family to obtain needed information for disability application. SS 
will continue to follow for discharge planning.

## 2020-08-01 VITALS — SYSTOLIC BLOOD PRESSURE: 133 MMHG | DIASTOLIC BLOOD PRESSURE: 77 MMHG

## 2020-08-01 VITALS — DIASTOLIC BLOOD PRESSURE: 84 MMHG | SYSTOLIC BLOOD PRESSURE: 130 MMHG

## 2020-08-01 VITALS — DIASTOLIC BLOOD PRESSURE: 83 MMHG | SYSTOLIC BLOOD PRESSURE: 146 MMHG

## 2020-08-01 VITALS — SYSTOLIC BLOOD PRESSURE: 141 MMHG | DIASTOLIC BLOOD PRESSURE: 92 MMHG

## 2020-08-01 VITALS — DIASTOLIC BLOOD PRESSURE: 91 MMHG | SYSTOLIC BLOOD PRESSURE: 136 MMHG

## 2020-08-01 VITALS — DIASTOLIC BLOOD PRESSURE: 84 MMHG | SYSTOLIC BLOOD PRESSURE: 135 MMHG

## 2020-08-01 LAB
ANION GAP SERPL CALC-SCNC: 2 MMOL/L (ref 6–14)
BASOPHILS # BLD AUTO: 0.1 X10^3/UL (ref 0–0.2)
BASOPHILS NFR BLD: 0 % (ref 0–3)
BUN SERPL-MCNC: 10 MG/DL (ref 7–20)
CALCIUM SERPL-MCNC: 10.4 MG/DL (ref 8.5–10.1)
CHLORIDE SERPL-SCNC: 101 MMOL/L (ref 98–107)
CO2 SERPL-SCNC: 27 MMOL/L (ref 21–32)
CREAT SERPL-MCNC: 0.6 MG/DL (ref 0.6–1)
EOSINOPHIL NFR BLD: 0.2 X10^3/UL (ref 0–0.7)
EOSINOPHIL NFR BLD: 1 % (ref 0–3)
ERYTHROCYTE [DISTWIDTH] IN BLOOD BY AUTOMATED COUNT: 17.2 % (ref 11.5–14.5)
GFR SERPLBLD BASED ON 1.73 SQ M-ARVRAT: 106.3 ML/MIN
GLUCOSE SERPL-MCNC: 143 MG/DL (ref 70–99)
HCT VFR BLD CALC: 24.3 % (ref 36–47)
HGB BLD-MCNC: 8 G/DL (ref 12–15.5)
LYMPHOCYTES # BLD: 1.5 X10^3/UL (ref 1–4.8)
LYMPHOCYTES NFR BLD AUTO: 10 % (ref 24–48)
MCH RBC QN AUTO: 28 PG (ref 25–35)
MCHC RBC AUTO-ENTMCNC: 33 G/DL (ref 31–37)
MCV RBC AUTO: 86 FL (ref 79–100)
MONO #: 1 X10^3/UL (ref 0–1.1)
MONOCYTES NFR BLD: 6 % (ref 0–9)
NEUT #: 13.4 X10^3/UL (ref 1.8–7.7)
NEUTROPHILS NFR BLD AUTO: 83 % (ref 31–73)
PLATELET # BLD AUTO: 660 X10^3/UL (ref 140–400)
POTASSIUM SERPL-SCNC: 4.2 MMOL/L (ref 3.5–5.1)
RBC # BLD AUTO: 2.83 X10^6/UL (ref 3.5–5.4)
SODIUM SERPL-SCNC: 130 MMOL/L (ref 136–145)
WBC # BLD AUTO: 16.2 X10^3/UL (ref 4–11)

## 2020-08-01 RX ADMIN — ENOXAPARIN SODIUM SCH MG: 40 INJECTION SUBCUTANEOUS at 07:46

## 2020-08-01 RX ADMIN — IPRATROPIUM BROMIDE AND ALBUTEROL SULFATE SCH ML: .5; 3 SOLUTION RESPIRATORY (INHALATION) at 12:38

## 2020-08-01 RX ADMIN — IPRATROPIUM BROMIDE AND ALBUTEROL SULFATE SCH ML: .5; 3 SOLUTION RESPIRATORY (INHALATION) at 22:38

## 2020-08-01 RX ADMIN — CEFTAZIDIME, AVIBACTAM SCH MLS/HR: 2; .5 POWDER, FOR SOLUTION INTRAVENOUS at 13:39

## 2020-08-01 RX ADMIN — DAPTOMYCIN SCH MLS/HR: 500 INJECTION, POWDER, LYOPHILIZED, FOR SOLUTION INTRAVENOUS at 09:16

## 2020-08-01 RX ADMIN — IPRATROPIUM BROMIDE AND ALBUTEROL SULFATE SCH ML: .5; 3 SOLUTION RESPIRATORY (INHALATION) at 04:16

## 2020-08-01 RX ADMIN — GLYCERIN, ISOLEUCINE, LEUCINE, LYSINE, METHIONINE, PHENYLALANINE, THREONINE, TRYPTOPHAN, VALINE, ALANINE, GLYCINE, ARGININE, HISTIDINE, PROLINE, SERINE, CYSTEINE, SODIUM ACETATE, MAGNESIUM ACETATE, CALCIUM ACETATE, SODIUM CHLORIDE, POTASSIUM CHLORIDE, PHOSPHORIC ACID, AND POTASSIUM METABISULFITE SCH MLS/HR
3; .21; .27; .22; .16; .17; .12; .046; .2; .21; .42; .29; .085; .34; .18; .014; .2; .054; .026; .12; .15; .041 INJECTION INTRAVENOUS at 05:12

## 2020-08-01 RX ADMIN — PANTOPRAZOLE SODIUM SCH MG: 40 INJECTION, POWDER, FOR SOLUTION INTRAVENOUS at 07:43

## 2020-08-01 RX ADMIN — FENTANYL CITRATE PRN MCG: 50 INJECTION INTRAMUSCULAR; INTRAVENOUS at 23:56

## 2020-08-01 RX ADMIN — ONDANSETRON PRN MG: 2 INJECTION INTRAMUSCULAR; INTRAVENOUS at 02:58

## 2020-08-01 RX ADMIN — IPRATROPIUM BROMIDE AND ALBUTEROL SULFATE SCH ML: .5; 3 SOLUTION RESPIRATORY (INHALATION) at 08:21

## 2020-08-01 RX ADMIN — BACITRACIN SCH MLS/HR: 5000 INJECTION, POWDER, FOR SOLUTION INTRAMUSCULAR at 14:33

## 2020-08-01 RX ADMIN — DEXTROSE SCH MLS/HR: 50 INJECTION, SOLUTION INTRAVENOUS at 08:10

## 2020-08-01 RX ADMIN — CEFTAZIDIME, AVIBACTAM SCH MLS/HR: 2; .5 POWDER, FOR SOLUTION INTRAVENOUS at 06:15

## 2020-08-01 RX ADMIN — FENTANYL CITRATE PRN MCG: 50 INJECTION INTRAMUSCULAR; INTRAVENOUS at 21:40

## 2020-08-01 RX ADMIN — FENTANYL CITRATE PRN MCG: 50 INJECTION INTRAMUSCULAR; INTRAVENOUS at 15:00

## 2020-08-01 RX ADMIN — INSULIN LISPRO SCH UNITS: 100 INJECTION, SOLUTION INTRAVENOUS; SUBCUTANEOUS at 06:00

## 2020-08-01 RX ADMIN — ACETYLCYSTEINE SCH MG: 200 INHALANT RESPIRATORY (INHALATION) at 19:35

## 2020-08-01 RX ADMIN — FENTANYL CITRATE PRN MCG: 50 INJECTION INTRAMUSCULAR; INTRAVENOUS at 08:33

## 2020-08-01 RX ADMIN — IPRATROPIUM BROMIDE AND ALBUTEROL SULFATE SCH ML: .5; 3 SOLUTION RESPIRATORY (INHALATION) at 19:35

## 2020-08-01 RX ADMIN — FENTANYL CITRATE PRN MCG: 50 INJECTION INTRAMUSCULAR; INTRAVENOUS at 06:14

## 2020-08-01 RX ADMIN — IPRATROPIUM BROMIDE AND ALBUTEROL SULFATE SCH ML: .5; 3 SOLUTION RESPIRATORY (INHALATION) at 15:32

## 2020-08-01 RX ADMIN — FENTANYL CITRATE PRN MCG: 50 INJECTION INTRAMUSCULAR; INTRAVENOUS at 02:58

## 2020-08-01 RX ADMIN — GLYCERIN, ISOLEUCINE, LEUCINE, LYSINE, METHIONINE, PHENYLALANINE, THREONINE, TRYPTOPHAN, VALINE, ALANINE, GLYCINE, ARGININE, HISTIDINE, PROLINE, SERINE, CYSTEINE, SODIUM ACETATE, MAGNESIUM ACETATE, CALCIUM ACETATE, SODIUM CHLORIDE, POTASSIUM CHLORIDE, PHOSPHORIC ACID, AND POTASSIUM METABISULFITE SCH MLS/HR
3; .21; .27; .22; .16; .17; .12; .046; .2; .21; .42; .29; .085; .34; .18; .014; .2; .054; .026; .12; .15; .041 INJECTION INTRAVENOUS at 18:10

## 2020-08-01 RX ADMIN — INSULIN LISPRO SCH UNITS: 100 INJECTION, SOLUTION INTRAVENOUS; SUBCUTANEOUS at 18:00

## 2020-08-01 RX ADMIN — INSULIN LISPRO SCH UNITS: 100 INJECTION, SOLUTION INTRAVENOUS; SUBCUTANEOUS at 12:00

## 2020-08-01 RX ADMIN — ACETYLCYSTEINE SCH MG: 200 INHALANT RESPIRATORY (INHALATION) at 08:00

## 2020-08-01 RX ADMIN — CEFTAZIDIME, AVIBACTAM SCH MLS/HR: 2; .5 POWDER, FOR SOLUTION INTRAVENOUS at 21:43

## 2020-08-01 RX ADMIN — ONDANSETRON PRN MG: 2 INJECTION INTRAMUSCULAR; INTRAVENOUS at 06:39

## 2020-08-01 RX ADMIN — FENTANYL CITRATE PRN MCG: 50 INJECTION INTRAMUSCULAR; INTRAVENOUS at 17:06

## 2020-08-01 RX ADMIN — FENTANYL CITRATE PRN MCG: 50 INJECTION INTRAMUSCULAR; INTRAVENOUS at 00:08

## 2020-08-01 NOTE — PDOC
Infectious Disease Note


Subjective


Subjective





States ok.  Mild nausea again this am. No SOA/F/S/C. pain ok





ROS


ROS


o/w neg





Vital Sign


Vital Signs





Vital Signs








  Date Time  Temp Pulse Resp B/P (MAP) Pulse Ox O2 Delivery O2 Flow Rate FiO2


 


8/1/20 04:16     99 Room Air  


 


8/1/20 03:28   29     


 


8/1/20 03:00 98.2 130  136/91 (106)    





 98.2       











Physical Exam


PHYSICAL EXAM


GENERAL: pt in bed, appears weak -comfortable -alert


HEENT: Pupils equal, oral cavity dry. OC/Op - Dry


NECK:  Tracheostomy - no JVD


LUNGS: Diminished aeration bases,  CT on left 


HEART:  S1, S2, 


ABDOMEN: Less Distended mild- bowel sounds hypoactive, soft, richardson x 2, ROBERT 

drains, G-J tube. Some drainage around R quad tube and mild insertion site i

rritation


: Chino in place 


EXTREMITIES: Trace generalized edema, no cyanosis. Yeast in groin area


SKIN: warm touch. No signs of rash.  


LUE- Previous PICC site without signs of complications. PIV s clean


NEURO: - Alert and responsive


PICC RUE - clean





Labs


Lab





Laboratory Tests








Test


 7/31/20


08:36 7/31/20


12:07 7/31/20


17:38 7/31/20


23:38


 


Glucose (Fingerstick)


 141 mg/dL


(70-99) 159 mg/dL


(70-99) 131 mg/dL


(70-99) 143 mg/dL


(70-99)








Micro


6/7 





GRAM STAIN  Final  


        Final





        GRAM NEGATIVE RODS:MODERATE


        SQUAMOUS EPI CELL:NOT APPLICABLE


        PMN (WBCs):RARE


        YEAST:MODERATE


        Unless otherwise specified, Testing Performed by:


        [address] [address]


        For Inquires, the Physician may contact the Microbiology


        department at [phone]





  ANAEROBIC-AEROBIC CULTURE  Preliminary  


        Preliminary





        MANY GRAM NEGATIVE RODS on 06/09/20 at 1152


        FINAL ID= [PSEUDOMONAS [email or web address]]


        PSEUDOMONAS AERUGINOSA





  ANTIMICROBIAL SUSCEPTIBILITY  Preliminary  


        Comment





        NEG ANSON 56


        PSEUDOMONAS AERUGINOSA


        ANTIBIOTIC                        RESULT          INTERPRETATION


        AMIKACIN                          <=16                  S


        AZTREONAM                         >16                   R


        CEFTAZIDIME                       >16                   R


        CIPROFLOXACIN                     <=0.25                S


        CEFEPIME                          16                    I


        CEFTAZIDIME/AVIBACTAM             <=4                   S


        GENTAMICIN                        <=2                   S














                               ** CONTINUED ON NEXT PAGE **





---------------------------------------------------------------------------

-----------------





RUN DATE: 06/11/20                  [facility] *LIVE*               

  PAGE 2   


RUN TIME: 1016                            Specimen Inquiry                    


-----------------------------------------------------------------

---------------------------





SPEC: [id number]    PATIENT: JESENIA BEAN                [id number]  

(Continued)


----------

--------------------------------------------------------------------------------


--








-------------------------------------------

-------------------------------------------------





  Procedure                         Result                                      

         


 

--------------------------------------------------------------------------------


------------





  ANTIMICROBIAL SUSCEPTIBILITY  Preliminary   (continued)


        LEVOFLOXACIN                      <=0.5                 S


        MEROPENEM                         <=1                   S


        PIPERACILLIN/TAZOBACTAM           64                    S


        TOBRAMYCIN                        <=2                   S


        Unless otherwise specified, Testing Performed by:


        [facility]


        [address] [place] [address]


        For Inquires, the Physician may contact the Microbiology


        department at [phone]











CT Scan 6/6





IMPRESSION:


1. Removal of the percutaneous pigtail drainage catheters since the prior 


exam. Sequela of pancreatitis with extensive pseudocysts again 


demonstrated, the right-sided collections are slightly larger since the 


prior exam, the left-sided collections are stable. See above.


2. Moderate to large left pleural effusion with atelectasis and collapse 


of most of the left lower lobe, stable. Small right pleural effusion is 


stable.


3. Gallstone.





Objective


Assessment





Patient with prolonged hospitalization more than 4 months


Multiple medical problems


Multiple surgical procedures





URINE with parapsilosis


S/P Exp. 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 

parapsilosis & PSAE (I-merrem/ceftazidime/AZT/cefepime))


Leukocytosis - up to but clinically stable


Fever - 7/25 c-diff neg


Anemia


Acute gallstone pancreatitis with persistent necrosis


  - 4/9.  CT A/P Increased ascites. Persistent evidence of necrotizing 

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


  - 4/27. status post ROBERT drain placement; C. parapsilosis. s/p drain 5/6 + yeast

& high amylase; s/p additional drain on 5/8. Drains removed. 


  -5/6. fluid  candida parapsilosis fluid, amylase high


  - 6/6 showed multiple pseudocysts, slight larger on the right. s/p drains x 3,

6/7.  + PSAE (MDRO-R Cefepime, Zosyn ANSON < 64) and yeast, 


  -6/7 s/p drain replacement x 3; fluid cult PSAE (MDRO), yeast; treated


  -7/12 CT A/P shows smaller fluid collections.  


  -722 CT abdomen and pelvis drains in place       


Ascites s/p paracentesis 4/15 & 5/6. C. parapsilosis 


Cholelithiasis with thickening of the gallbladder wall.


JED, Hyperkalemia, Metabolic acidosis off dialysis


Acute hypoxic resp failure. trach/vent. sputum 6/13  + PSAE (I merrem) ; sputum 

culture July 19+ for PSAE R Merrem, sensitive to cefepime


Pleural effusion status post CTS left side


 Abdominal fluid culture 6 /7 MDRO Pseudomonas, yeast


Sputum culture positive 7/19 for MDRO Pseudomonas


Chest tube fluid positive for 7/21 Candida Parapsilosis





Plan


Plan of Care





Chino changed 7/27


Continue Avycaz 7/23 /micafungin 6/30


Restart daptomycin 7/26(CK <7 7/28) wean soon


CBC/BMP in am


Nystatin to groin


UA and urine culture Blood culture/Cath tip neg - neg


C. difficile negative


Monitor WBC/temp 


Wound care /drain management as directed


Contact isolation for CRE/MDRO








Critically ill





Long term prognosis  poor





D/w nursing











RASHAWN ROSEN MD               Aug 1, 2020 05:58

## 2020-08-01 NOTE — PDOC
[employer] PROGRESS NOTE


Chief Complaint


Chief Complaint


S/P Exp. 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 

parapsilosis & PSAE (I-merrem/ceftazidime/AZT/cefepime))


Leucocytosis -stable


[id number] last night


Acute gallstone pancreatitis with persistent necrosis


Acute hypoxic Respiratory failure required mechanical ventilation


Tracheostomy Wednesday.


bilateral pleural effusions/pulm edema s/p Throacentesis on 6/15/2020


Severe Acute gallstone pancreatitis (not a surgical candidate at this time) with

necrosis


Acute kidney failure now requiring dialysis


Gallstones (Calculus of gallbladder with acute cholecystitis without 

obstruction)


HTN 


Intractable pain


Intractable nausea


Covid 19 negative. 


Acute on chronic anemia 


EEG: No seizure activity


Fever  - intermittent 


? Ileus with vomiting


Abd distention - U/S and CT reviewed s/p 0.4 L of opaque, debris-containing 

ascites was removed 5/6


Acute pancreatitis with persistent necrosis


Gallstone pancreatitis with necrosis. 


   -CT A/P 6/6 showed multiple pseudocysts, slight larger on the right. s/p 

drains x 3, 6/7.  + PSAE (MDRO-R Cefepime, Zosyn ANSON < 64) and yeast, 


   -s/p drain 4/27. C. parapsilosis. s/p drain 5/6 + yeast & high amylase; s/p 

additional drain on 5/8. Drains removed. 


Ascites s/p paracentesis 4/15 & 5/6. C. parapsilosis 


JED. off HD. 


A large fluid collection in the pancreatic bed has slightly decreased in size, 

described below, the pancreas itself is difficult to  visualize, which could be 

due to necrosis or obscuration of pancreatic  parenchyma from the surrounding 

fluid collection.6/15 


- 4/27 status post ROBERT drain placement + C paropsilosis. s/p additional drains 

5/8


Anemia - S/p PRBCs. 


Cholelithiasis with thickening of the gallbladder wall.


Leucocytosis improving


JED, hyperkalemia, Metabolic acidosis off dialysis


hypocalcemia 


Prediabetes


HTN


s/p trach


Hyperglycemia


severe protein-caloric malnutrition


Moderate to large left pleural effusion with atelectasis and collapse  of most 

of the left lower lobe, stable


Extensive retroperitoneal fluid collections persist. Percutaneous  drains remain

within the collections in both paracolic gutters. These  communicate with 

additional pelvic and peripancreatic collections.











PLAN================











per ID recommendations, continue Avycaz, micafungin, daptomycin.  Nystatin to 

the groin


Negative C diff


BC from 7/15 neg to date 


7/26 PICC line removed will start PPN for 24 hours then replace PICC line other 

side


Follow surgery input regarding diet and drains, pending GJ tube placement with 

IR before restarting tube feeds.


DVT GI prophylaxis


f/u BC /resp cultures


continue DVT/GI PPX





Discussed with RN

















40 MIN CC TIME





History of Present Illness


History of Present Illness


8/1/2020


No acute events overnight.  Seen and examined at bedside.  Patient had a fever 1

00.2.  Negative fluid balance of 150 cc.  Patient's chart, labs, images were 

reviewed and discussed with RN





7/31/2020


Patient seen and examined at bedside.  No acute events overnight.  Positive 

fluid balance 633.  Patient's chart, labs, images were reviewed and discussed 

with RN








7/30/2020


Patient seen and examined bedside.  No acute events overnight.  Patient's chart,

labs, images were reviewed and discussed with RN








7/28/2020


Patient seen and examined bedside.  Patient appears to be in no obvious pain.  

All drains have been adequately draining.  Patient continues to be on TPN.  

Chart labs and imaging was reviewed.  Negative fluid balance of 270 cc


7/27/2020


Patient seen and examined in the ICU.  Patient still requires extensive care.  

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  Chart, labs, imaging was 

reviewed.  UOP with + 500cc balance.








7/25 /2020





Patient seen in and examined in the ICU


She is still extremely critically ill


Appears weak, frail, and pale


Better color today with improved eye contact and expression


Discussed with RN


Chart reviewed











 








7/21/2020


Patient seen and examined in the ICU


She is still extremely critically ill


Appears extremely weak frail and pale


Discussed with RN


Chart reviewed





Vitals/I&O


Vitals/I&O:





                                   Vital Signs








  Date Time  Temp Pulse Resp B/P (MAP) Pulse Ox O2 Delivery O2 Flow Rate FiO2


 


8/1/20 09:16   18  95 Room Air  


 


8/1/20 08:33       2.0 


 


8/1/20 07:00 100.2 128  146/83 (104)    





 100.2       














                                    I & O   


 


 7/31/20 7/31/20 8/1/20





 15:00 23:00 07:00


 


Intake Total 200 ml 1660 ml 1150 ml


 


Output Total 160 ml 1040 ml 725 ml


 


Balance 40 ml 620 ml 425 ml











Physical Exam


Physical Exam:


GENERAL: pt in bed, appears weak -comfortable -alert


HEENT: Pupils equal, oral cavity dry. OC/Op - Dry


NECK:  Tracheostomy - no JVD


LUNGS: Diminished aeration bases,  CT on left 


HEART:  S1, S2, 


ABDOMEN: Less Distended mild- bowel sounds hypoactive, soft, richardson x 2, ROBERT 

drains, G-J tube. Some drainage around R quad tube and mild insertion site 

irritation


: [place] in place 


EXTREMITIES: Trace generalized edema, no cyanosis. Yeast in groin area


SKIN: warm touch. No signs of rash.  


LUE- Previous PICC site without signs of complications. PIV s clean


NEURO: - Alert and responsive


PICC RUE - clean


General:  Cooperative, No acute distress


Heart:  Other (distant heart sounds, tachycardic)


Lungs:  Crackles


Abdomen:  Soft, Other (multiple drains)


Extremities:  Other (Diffuse edema)


Skin:  No rashes, No significant lesion





Labs


Labs:





Laboratory Tests








Test


 7/31/20


12:07 7/31/20


17:38 7/31/20


23:38 8/1/20


06:05


 


Glucose (Fingerstick)


 159 mg/dL


(70-99) 131 mg/dL


(70-99) 143 mg/dL


(70-99) 





 


White Blood Count


 


 


 


 16.2 x10^3/uL


(4.0-11.0)


 


Red Blood Count


 


 


 


 2.83 x10^6/uL


(3.50-5.40)


 


Hemoglobin


 


 


 


 8.0 g/dL


(12.0-15.5)


 


Hematocrit


 


 


 


 24.3 %


(36.0-47.0)


 


Mean Corpuscular Volume    86 fL () 


 


Mean Corpuscular Hemoglobin    28 pg (25-35) 


 


Mean Corpuscular Hemoglobin


Concent 


 


 


 33 g/dL


(31-37)


 


Red Cell Distribution Width


 


 


 


 17.2 %


(11.5-14.5)


 


Platelet Count


 


 


 


 660 x10^3/uL


(140-400)


 


Neutrophils (%) (Auto)    83 % (31-73) 


 


Lymphocytes (%) (Auto)    10 % (24-48) 


 


Monocytes (%) (Auto)    6 % (0-9) 


 


Eosinophils (%) (Auto)    1 % (0-3) 


 


Basophils (%) (Auto)    0 % (0-3) 


 


Neutrophils # (Auto)


 


 


 


 13.4 x10^3/uL


(1.8-7.7)


 


Lymphocytes # (Auto)


 


 


 


 1.5 x10^3/uL


(1.0-4.8)


 


Monocytes # (Auto)


 


 


 


 1.0 x10^3/uL


(0.0-1.1)


 


Eosinophils # (Auto)


 


 


 


 0.2 x10^3/uL


(0.0-0.7)


 


Basophils # (Auto)


 


 


 


 0.1 x10^3/uL


(0.0-0.2)


 


Sodium Level


 


 


 


 130 mmol/L


(136-145)


 


Potassium Level


 


 


 


 4.2 mmol/L


(3.5-5.1)


 


Chloride Level


 


 


 


 101 mmol/L


()


 


Carbon Dioxide Level


 


 


 


 27 mmol/L


(21-32)


 


Anion Gap    2 (6-14) 


 


Blood Urea Nitrogen


 


 


 


 10 mg/dL


(7-20)


 


Creatinine


 


 


 


 0.6 mg/dL


(0.6-1.0)


 


Estimated GFR


(Cockcroft-Gault) 


 


 


 106.3 





 


Glucose Level


 


 


 


 143 mg/dL


(70-99)


 


Calcium Level


 


 


 


 10.4 mg/dL


(8.5-10.1)


 


Test


 8/1/20


06:22 


 


 





 


Glucose (Fingerstick)


 139 mg/dL


(70-99) 


 


 














Assessment and Plan


Assessmemt and Plan


Problems


Medical Problems:


(1) Acute pancreatitis


Status: Acute  





(2) Cholelithiasis


Status: Acute  











Comment


Review of Relevant


I have reviewed the following items josy (where applicable) has been applied.





Justicifation of Admission Dx:


Justifications for Admission:


Justification of Admission Dx:  Yes











JACINTO MIRANDA MD                     Aug 1, 2020 10:35

## 2020-08-01 NOTE — PDOC
SURGICAL PROGRESS NOTE


Subjective


Pt sitting up, does note some upset stomach


Vital Signs





Vital Signs








  Date Time  Temp Pulse Resp B/P (MAP) Pulse Ox O2 Delivery O2 Flow Rate FiO2


 


8/1/20 11:00 97.8 122 29 135/84 (101) 96 Room Air  





 97.8       


 


8/1/20 08:33       2.0 








I&O











Intake and Output 


 


 8/1/20





 07:00


 


Intake Total 3010 ml


 


Output Total 1925 ml


 


Balance 1085 ml


 


 


 


Intake Oral 0 ml


 


IV Total 1250 ml


 


Tube Feeding 1410 ml


 


Blood Product 250 ml


 


Other 100 ml


 


Output Urine Total 1025 ml


 


Drainage Total 900 ml


 


# Bowel Movements 3








General:  Alert, Cooperative, No acute distress


Abdomen:  Soft, No tenderness, Other (drain with brownish drainage, wound vac in

place)


Labs





Laboratory Tests








Test


 7/30/20


17:49 7/30/20


23:00 7/31/20


05:01 7/31/20


08:36


 


Glucose (Fingerstick)


 137 mg/dL


(70-99) 117 mg/dL


(70-99) 146 mg/dL


(70-99) 141 mg/dL


(70-99)


 


Test


 7/31/20


12:07 7/31/20


17:38 7/31/20


23:38 8/1/20


06:05


 


Glucose (Fingerstick)


 159 mg/dL


(70-99) 131 mg/dL


(70-99) 143 mg/dL


(70-99) 





 


White Blood Count


 


 


 


 16.2 x10^3/uL


(4.0-11.0)


 


Red Blood Count


 


 


 


 2.83 x10^6/uL


(3.50-5.40)


 


Hemoglobin


 


 


 


 8.0 g/dL


(12.0-15.5)


 


Hematocrit


 


 


 


 24.3 %


(36.0-47.0)


 


Mean Corpuscular Volume    86 fL () 


 


Mean Corpuscular Hemoglobin    28 pg (25-35) 


 


Mean Corpuscular Hemoglobin


Concent 


 


 


 33 g/dL


(31-37)


 


Red Cell Distribution Width


 


 


 


 17.2 %


(11.5-14.5)


 


Platelet Count


 


 


 


 660 x10^3/uL


(140-400)


 


Neutrophils (%) (Auto)    83 % (31-73) 


 


Lymphocytes (%) (Auto)    10 % (24-48) 


 


Monocytes (%) (Auto)    6 % (0-9) 


 


Eosinophils (%) (Auto)    1 % (0-3) 


 


Basophils (%) (Auto)    0 % (0-3) 


 


Neutrophils # (Auto)


 


 


 


 13.4 x10^3/uL


(1.8-7.7)


 


Lymphocytes # (Auto)


 


 


 


 1.5 x10^3/uL


(1.0-4.8)


 


Monocytes # (Auto)


 


 


 


 1.0 x10^3/uL


(0.0-1.1)


 


Eosinophils # (Auto)


 


 


 


 0.2 x10^3/uL


(0.0-0.7)


 


Basophils # (Auto)


 


 


 


 0.1 x10^3/uL


(0.0-0.2)


 


Sodium Level


 


 


 


 130 mmol/L


(136-145)


 


Potassium Level


 


 


 


 4.2 mmol/L


(3.5-5.1)


 


Chloride Level


 


 


 


 101 mmol/L


()


 


Carbon Dioxide Level


 


 


 


 27 mmol/L


(21-32)


 


Anion Gap    2 (6-14) 


 


Blood Urea Nitrogen


 


 


 


 10 mg/dL


(7-20)


 


Creatinine


 


 


 


 0.6 mg/dL


(0.6-1.0)


 


Estimated GFR


(Cockcroft-Gault) 


 


 


 106.3 





 


Glucose Level


 


 


 


 143 mg/dL


(70-99)


 


Calcium Level


 


 


 


 10.4 mg/dL


(8.5-10.1)


 


Test


 8/1/20


06:22 8/1/20


11:48 


 





 


Glucose (Fingerstick)


 139 mg/dL


(70-99) 147 mg/dL


(70-99) 


 











Laboratory Tests








Test


 7/31/20


17:38 7/31/20


23:38 8/1/20


06:05 8/1/20


06:22


 


Glucose (Fingerstick)


 131 mg/dL


(70-99) 143 mg/dL


(70-99) 


 139 mg/dL


(70-99)


 


White Blood Count


 


 


 16.2 x10^3/uL


(4.0-11.0) 





 


Red Blood Count


 


 


 2.83 x10^6/uL


(3.50-5.40) 





 


Hemoglobin


 


 


 8.0 g/dL


(12.0-15.5) 





 


Hematocrit


 


 


 24.3 %


(36.0-47.0) 





 


Mean Corpuscular Volume   86 fL ()  


 


Mean Corpuscular Hemoglobin   28 pg (25-35)  


 


Mean Corpuscular Hemoglobin


Concent 


 


 33 g/dL


(31-37) 





 


Red Cell Distribution Width


 


 


 17.2 %


(11.5-14.5) 





 


Platelet Count


 


 


 660 x10^3/uL


(140-400) 





 


Neutrophils (%) (Auto)   83 % (31-73)  


 


Lymphocytes (%) (Auto)   10 % (24-48)  


 


Monocytes (%) (Auto)   6 % (0-9)  


 


Eosinophils (%) (Auto)   1 % (0-3)  


 


Basophils (%) (Auto)   0 % (0-3)  


 


Neutrophils # (Auto)


 


 


 13.4 x10^3/uL


(1.8-7.7) 





 


Lymphocytes # (Auto)


 


 


 1.5 x10^3/uL


(1.0-4.8) 





 


Monocytes # (Auto)


 


 


 1.0 x10^3/uL


(0.0-1.1) 





 


Eosinophils # (Auto)


 


 


 0.2 x10^3/uL


(0.0-0.7) 





 


Basophils # (Auto)


 


 


 0.1 x10^3/uL


(0.0-0.2) 





 


Sodium Level


 


 


 130 mmol/L


(136-145) 





 


Potassium Level


 


 


 4.2 mmol/L


(3.5-5.1) 





 


Chloride Level


 


 


 101 mmol/L


() 





 


Carbon Dioxide Level


 


 


 27 mmol/L


(21-32) 





 


Anion Gap   2 (6-14)  


 


Blood Urea Nitrogen


 


 


 10 mg/dL


(7-20) 





 


Creatinine


 


 


 0.6 mg/dL


(0.6-1.0) 





 


Estimated GFR


(Cockcroft-Gault) 


 


 106.3 


 





 


Glucose Level


 


 


 143 mg/dL


(70-99) 





 


Calcium Level


 


 


 10.4 mg/dL


(8.5-10.1) 





 


Test


 8/1/20


11:48 


 


 





 


Glucose (Fingerstick)


 147 mg/dL


(70-99) 


 


 











Problem List


Problems


Medical Problems:


(1) Acute pancreatitis


Status: Acute  





(2) Cholelithiasis


Status: Acute  








Assessment/Plan


cont supportive care and drains





Justicifation of Admission Dx:


Justifications for Admission:


Justification of Admission Dx:  Yes











GAMAL ZHOU MD              Aug 1, 2020 12:38

## 2020-08-01 NOTE — NUR
Patient, again, saturates dressings abdomen, addi-30cc, but drained around the tube insertion 
site, gown and chux again, changed. fentanyl and zofran given.

## 2020-08-01 NOTE — NUR
Patient has been turned, stool cleaned from bottom, noted between legs a 'yeasty' like 
appearance, cleansed, dressings have been changed, as had leaked on gown, bed, and chux, 
greenish in color, monitoring.

## 2020-08-02 VITALS — DIASTOLIC BLOOD PRESSURE: 69 MMHG | SYSTOLIC BLOOD PRESSURE: 163 MMHG

## 2020-08-02 VITALS — SYSTOLIC BLOOD PRESSURE: 140 MMHG | DIASTOLIC BLOOD PRESSURE: 77 MMHG

## 2020-08-02 VITALS — DIASTOLIC BLOOD PRESSURE: 67 MMHG | SYSTOLIC BLOOD PRESSURE: 143 MMHG

## 2020-08-02 VITALS — DIASTOLIC BLOOD PRESSURE: 84 MMHG | SYSTOLIC BLOOD PRESSURE: 137 MMHG

## 2020-08-02 VITALS — DIASTOLIC BLOOD PRESSURE: 83 MMHG | SYSTOLIC BLOOD PRESSURE: 164 MMHG

## 2020-08-02 VITALS — DIASTOLIC BLOOD PRESSURE: 69 MMHG | SYSTOLIC BLOOD PRESSURE: 142 MMHG

## 2020-08-02 LAB
ANION GAP SERPL CALC-SCNC: 3 MMOL/L (ref 6–14)
BASOPHILS # BLD AUTO: 0.1 X10^3/UL (ref 0–0.2)
BASOPHILS NFR BLD: 0 % (ref 0–3)
BUN SERPL-MCNC: 8 MG/DL (ref 7–20)
CALCIUM SERPL-MCNC: 9.9 MG/DL (ref 8.5–10.1)
CHLORIDE SERPL-SCNC: 100 MMOL/L (ref 98–107)
CO2 SERPL-SCNC: 26 MMOL/L (ref 21–32)
CREAT SERPL-MCNC: 0.5 MG/DL (ref 0.6–1)
EOSINOPHIL NFR BLD: 0.2 X10^3/UL (ref 0–0.7)
EOSINOPHIL NFR BLD: 2 % (ref 0–3)
ERYTHROCYTE [DISTWIDTH] IN BLOOD BY AUTOMATED COUNT: 17.4 % (ref 11.5–14.5)
ERYTHROCYTE [DISTWIDTH] IN BLOOD BY AUTOMATED COUNT: 17.7 % (ref 11.5–14.5)
GFR SERPLBLD BASED ON 1.73 SQ M-ARVRAT: 131.1 ML/MIN
GLUCOSE SERPL-MCNC: 134 MG/DL (ref 70–99)
HCT VFR BLD CALC: 21.8 % (ref 36–47)
HCT VFR BLD CALC: 23 % (ref 36–47)
HGB BLD-MCNC: 6.9 G/DL (ref 12–15.5)
HGB BLD-MCNC: 7.4 G/DL (ref 12–15.5)
LYMPHOCYTES # BLD: 1.5 X10^3/UL (ref 1–4.8)
LYMPHOCYTES NFR BLD AUTO: 11 % (ref 24–48)
MCH RBC QN AUTO: 28 PG (ref 25–35)
MCH RBC QN AUTO: 28 PG (ref 25–35)
MCHC RBC AUTO-ENTMCNC: 32 G/DL (ref 31–37)
MCHC RBC AUTO-ENTMCNC: 32 G/DL (ref 31–37)
MCV RBC AUTO: 87 FL (ref 79–100)
MCV RBC AUTO: 87 FL (ref 79–100)
MONO #: 0.9 X10^3/UL (ref 0–1.1)
MONOCYTES NFR BLD: 7 % (ref 0–9)
NEUT #: 10.9 X10^3/UL (ref 1.8–7.7)
NEUTROPHILS NFR BLD AUTO: 80 % (ref 31–73)
PLATELET # BLD AUTO: 608 X10^3/UL (ref 140–400)
PLATELET # BLD AUTO: 621 X10^3/UL (ref 140–400)
POTASSIUM SERPL-SCNC: 4.9 MMOL/L (ref 3.5–5.1)
RBC # BLD AUTO: 2.5 X10^6/UL (ref 3.5–5.4)
RBC # BLD AUTO: 2.66 X10^6/UL (ref 3.5–5.4)
SODIUM SERPL-SCNC: 129 MMOL/L (ref 136–145)
WBC # BLD AUTO: 13.6 X10^3/UL (ref 4–11)
WBC # BLD AUTO: 13.9 X10^3/UL (ref 4–11)

## 2020-08-02 RX ADMIN — DAPTOMYCIN SCH MLS/HR: 500 INJECTION, POWDER, LYOPHILIZED, FOR SOLUTION INTRAVENOUS at 09:32

## 2020-08-02 RX ADMIN — INSULIN LISPRO SCH UNITS: 100 INJECTION, SOLUTION INTRAVENOUS; SUBCUTANEOUS at 17:45

## 2020-08-02 RX ADMIN — INSULIN LISPRO SCH UNITS: 100 INJECTION, SOLUTION INTRAVENOUS; SUBCUTANEOUS at 05:45

## 2020-08-02 RX ADMIN — PANTOPRAZOLE SODIUM SCH MG: 40 INJECTION, POWDER, FOR SOLUTION INTRAVENOUS at 08:09

## 2020-08-02 RX ADMIN — DEXTROSE SCH MLS/HR: 50 INJECTION, SOLUTION INTRAVENOUS at 08:10

## 2020-08-02 RX ADMIN — ENOXAPARIN SODIUM SCH MG: 40 INJECTION SUBCUTANEOUS at 09:32

## 2020-08-02 RX ADMIN — BACITRACIN SCH MLS/HR: 5000 INJECTION, POWDER, FOR SOLUTION INTRAMUSCULAR at 20:55

## 2020-08-02 RX ADMIN — IPRATROPIUM BROMIDE AND ALBUTEROL SULFATE SCH ML: .5; 3 SOLUTION RESPIRATORY (INHALATION) at 20:15

## 2020-08-02 RX ADMIN — ACETYLCYSTEINE SCH MG: 200 INHALANT RESPIRATORY (INHALATION) at 20:15

## 2020-08-02 RX ADMIN — INSULIN LISPRO SCH UNITS: 100 INJECTION, SOLUTION INTRAVENOUS; SUBCUTANEOUS at 23:49

## 2020-08-02 RX ADMIN — CEFTAZIDIME, AVIBACTAM SCH MLS/HR: 2; .5 POWDER, FOR SOLUTION INTRAVENOUS at 20:55

## 2020-08-02 RX ADMIN — IPRATROPIUM BROMIDE AND ALBUTEROL SULFATE SCH ML: .5; 3 SOLUTION RESPIRATORY (INHALATION) at 11:33

## 2020-08-02 RX ADMIN — IPRATROPIUM BROMIDE AND ALBUTEROL SULFATE SCH ML: .5; 3 SOLUTION RESPIRATORY (INHALATION) at 03:32

## 2020-08-02 RX ADMIN — INSULIN LISPRO SCH UNITS: 100 INJECTION, SOLUTION INTRAVENOUS; SUBCUTANEOUS at 00:00

## 2020-08-02 RX ADMIN — FENTANYL CITRATE PRN MCG: 50 INJECTION INTRAMUSCULAR; INTRAVENOUS at 11:08

## 2020-08-02 RX ADMIN — INSULIN LISPRO SCH UNITS: 100 INJECTION, SOLUTION INTRAVENOUS; SUBCUTANEOUS at 12:00

## 2020-08-02 RX ADMIN — CEFTAZIDIME, AVIBACTAM SCH MLS/HR: 2; .5 POWDER, FOR SOLUTION INTRAVENOUS at 14:04

## 2020-08-02 RX ADMIN — CEFTAZIDIME, AVIBACTAM SCH MLS/HR: 2; .5 POWDER, FOR SOLUTION INTRAVENOUS at 06:40

## 2020-08-02 RX ADMIN — ACETYLCYSTEINE SCH MG: 200 INHALANT RESPIRATORY (INHALATION) at 07:38

## 2020-08-02 RX ADMIN — IPRATROPIUM BROMIDE AND ALBUTEROL SULFATE SCH ML: .5; 3 SOLUTION RESPIRATORY (INHALATION) at 15:52

## 2020-08-02 RX ADMIN — IPRATROPIUM BROMIDE AND ALBUTEROL SULFATE SCH ML: .5; 3 SOLUTION RESPIRATORY (INHALATION) at 07:38

## 2020-08-02 RX ADMIN — FENTANYL CITRATE PRN MCG: 50 INJECTION INTRAMUSCULAR; INTRAVENOUS at 22:14

## 2020-08-02 NOTE — NUR
Dressings to right ROBERT drain and [place] drain saturated and changed multiple times throughout 
the shift. Drainage leaking around drain sites, no drainage collecting in the drains 
themselves. Will pass on.

Pt Hgb this am is 6.9. Dr. Leyva notified. Received order to continue current POC, do not 
give blood at this time. Wait for physicians to round this am. Will pass on and continue to 
monitor.

## 2020-08-02 NOTE — PDOC
Infectious Disease Note


Subjective


Subjective





States ok. Some discomfort with drains No SOA/F/S/C.





Vital Sign


Vital Signs





Vital Signs








  Date Time  Temp Pulse Resp B/P (MAP) Pulse Ox O2 Delivery O2 Flow Rate FiO2


 


8/2/20 03:33     96 Room Air  


 


8/2/20 03:00 98.3 132 28 164/83 (110)    





 98.3       


 


8/1/20 21:40       2.0 











Physical Exam


PHYSICAL EXAM


GENERAL: pt in bed, appears weak -comfortable -alert


HEENT: Pupils equal, oral cavity dry. OC/Op - Dry


NECK:  Tracheostomy - no JVD


LUNGS: Diminished aeration bases,  CT on left 


HEART:  S1, S2, 


ABDOMEN: Less Distended mild- bowel sounds hypoactive, soft, richardson x 2, ROBERT dr siu, G-J tube. Some drainage around R quad tube again and mild insertion site 

irritation


: [place] in place 


EXTREMITIES: Trace generalized edema, no cyanosis. Yeast in groin area


SKIN: warm touch. No signs of rash.  


LUE- Previous PICC site without signs of complications. PIV s clean


NEURO: - Alert and responsive


PICC RUE - clean





Labs


Lab





Laboratory Tests








Test


 8/1/20


11:48 8/1/20


18:08 8/1/20


23:58 8/2/20


05:40


 


Glucose (Fingerstick)


 147 mg/dL


(70-99) 142 mg/dL


(70-99) 135 mg/dL


(70-99) 





 


White Blood Count


 


 


 


 13.6 x10^3/uL


(4.0-11.0)


 


Red Blood Count


 


 


 


 2.50 x10^6/uL


(3.50-5.40)


 


Hemoglobin


 


 


 


 6.9 g/dL


(12.0-15.5)


 


Hematocrit


 


 


 


 21.8 %


(36.0-47.0)


 


Mean Corpuscular Volume    87 fL () 


 


Mean Corpuscular Hemoglobin    28 pg (25-35) 


 


Mean Corpuscular Hemoglobin


Concent 


 


 


 32 g/dL


(31-37)


 


Red Cell Distribution Width


 


 


 


 17.4 %


(11.5-14.5)


 


Platelet Count


 


 


 


 608 x10^3/uL


(140-400)


 


Neutrophils (%) (Auto)    80 % (31-73) 


 


Lymphocytes (%) (Auto)    11 % (24-48) 


 


Monocytes (%) (Auto)    7 % (0-9) 


 


Eosinophils (%) (Auto)    2 % (0-3) 


 


Basophils (%) (Auto)    0 % (0-3) 


 


Neutrophils # (Auto)


 


 


 


 10.9 x10^3/uL


(1.8-7.7)


 


Lymphocytes # (Auto)


 


 


 


 1.5 x10^3/uL


(1.0-4.8)


 


Monocytes # (Auto)


 


 


 


 0.9 x10^3/uL


(0.0-1.1)


 


Eosinophils # (Auto)


 


 


 


 0.2 x10^3/uL


(0.0-0.7)


 


Basophils # (Auto)


 


 


 


 0.1 x10^3/uL


(0.0-0.2)


 


Sodium Level


 


 


 


 129 mmol/L


(136-145)


 


Potassium Level


 


 


 


 4.9 mmol/L


(3.5-5.1)


 


Chloride Level


 


 


 


 100 mmol/L


()


 


Carbon Dioxide Level


 


 


 


 26 mmol/L


(21-32)


 


Anion Gap    3 (6-14) 


 


Blood Urea Nitrogen    8 mg/dL (7-20) 


 


Creatinine


 


 


 


 0.5 mg/dL


(0.6-1.0)


 


Estimated GFR


(Cockcroft-Gault) 


 


 


 131.1 





 


Glucose Level


 


 


 


 134 mg/dL


(70-99)


 


Calcium Level


 


 


 


 9.9 mg/dL


(8.5-10.1)


 


Test


 8/2/20


05:42 


 


 





 


Glucose (Fingerstick)


 147 mg/dL


(70-99) 


 


 











Micro


6/7 





GRAM STAIN  Final  


        Final





        GRAM NEGATIVE RODS:MODERATE


        SQUAMOUS EPI CELL:NOT APPLICABLE


        PMN (WBCs):RARE


        YEAST:MODERATE


        Unless otherwise specified, Testing Performed by:


        [address] [address]


        For Inquires, the Physician may contact the Microbiology


        department at [phone]





  ANAEROBIC-AEROBIC CULTURE  Preliminary  


        Preliminary





        MANY GRAM NEGATIVE RODS on 06/09/20 at 5086


        FINAL ID= [PSEUDOMONAS AERUGINOSA]


        PSEUDOMONAS AERUGINOSA





  ANTIMICROBIAL SUSCEPTIBILITY  Preliminary  


        Comment





        NEG ANSON 56


        PSEUDOMONAS AERUGINOSA


        ANTIBIOTIC                        RESULT          INTERPRETATION


        AMIKACIN                          <=16                  S


        AZTREONAM                         >16                   R


        CEFTAZIDIME                       >16                   R


        CIPROFLOXACIN                     <=0.25                S


        CEFEPIME                          16                    I


        CEFTAZIDIME/AVIBACTAM             <=4                   S


        GENTAMICIN                        <=2                   S














                               ** CONTINUED ON NEXT PAGE **





-----------------------------

---------------------------------------------------------------





RUN DATE: 06/11/20                  [facility] *LIVE*               

  PAGE 2   


RUN TIME: 1016                            Specimen Inquiry                    


-------------------

-------------------------------------------------------------------------





SPEC: [id number]    PATIENT: JESENIA BEAN                [id number]  

(Continued)


 

--------------------------------------------------------------------------------


------------








 

--------------------------------------------------------------------------------


------------





  Procedure                         Result                                      

         


--------------------------------------------------------------------

------------------------





  ANTIMICROBIAL SUSCEPTIBILITY  Preliminary   (continued)


        LEVOFLOXACIN                      <=0.5                 S


        MEROPENEM                         <=1                   S


        PIPERACILLIN/TAZOBACTAM           64                    S


        TOBRAMYCIN                        <=2                   S


        Unless otherwise specified, Testing Performed by:


        [facility]


        [address] [place] [address]


        For Inquires, the Physician may contact the Microbiology


        department at [phone]











CT Scan 6/6





IMPRESSION:


1. Removal of the percutaneous pigtail drainage catheters since the prior 


exam. Sequela of pancreatitis with extensive pseudocysts again 


demonstrated, the right-sided collections are slightly larger since the 


prior exam, the left-sided collections are stable. See above.


2. Moderate to large left pleural effusion with atelectasis and collapse 


of most of the left lower lobe, stable. Small right pleural effusion is 


stable.


3. Gallstone.





Objective


Assessment





Patient with prolonged hospitalization more than 4 months


Multiple medical problems


Multiple surgical procedures





URINE with parapsilosis


S/P Exp. 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 

parapsilosis & PSAE (I-merrem/ceftazidime/AZT/cefepime))


Leukocytosis - better


Loose stool but on tube feed - WBC down and no gross fever


Fever - 7/25 c-diff neg


Anemia


Acute gallstone pancreatitis with persistent necrosis


  - 4/9.  CT A/P Increased ascites. Persistent evidence of necrotizing 

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


  - 4/27. status post ROBERT drain placement; C. parapsilosis. s/p drain 5/6 + yeast

& high amylase; s/p additional drain on 5/8. Drains removed. 


  -5/6. fluid  candida parapsilosis fluid, amylase high


  - 6/6 showed multiple pseudocysts, slight larger on the right. s/p drains x 3,

6/7.  + PSAE (MDRO-R Cefepime, Zosyn ANSON < 64) and yeast, 


  -6/7 s/p drain replacement x 3; fluid cult PSAE (MDRO), yeast; treated


  -7/12 CT A/P shows smaller fluid collections.  


  -722 CT abdomen and pelvis drains in place       


Ascites s/p paracentesis 4/15 & 5/6. C. parapsilosis 


Cholelithiasis with thickening of the gallbladder wall.


JED, Hyperkalemia, Metabolic acidosis off dialysis


Acute hypoxic resp failure. trach/vent. sputum 6/13  + PSAE (I merrem) ; sputum 

culture July 19+ for PSAE R Merrem, sensitive to cefepime


Pleural effusion status post CTS left side


 Abdominal fluid culture 6 /7 MDRO Pseudomonas, yeast


Sputum culture positive 7/19 for MDRO Pseudomonas


Chest tube fluid positive for 7/21 Candida Parapsilosis





Plan


Plan of Care





Anemia per primary


Electrolyes per primary


[place] changed 7/27


Continue Avycaz 7/23 /micafungin 6/30


Restart daptomycin 7/26(CK <7 7/28) wean soon


CBC/BMP in am


Nystatin to groin


UA and urine culture Blood culture/Cath tip neg - neg


C. difficile negative


Monitor WBC/temp 


Wound care /drain management as directed


Contact isolation for CRE/MDRO








Critically ill





Long term prognosis  poor





D/w nursing











RASHAWN ROSEN MD               Aug 2, 2020 07:14

## 2020-08-02 NOTE — PDOC
[employer] PROGRESS NOTE


Chief Complaint


Chief Complaint


S/P Exp. 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 

parapsilosis & PSAE (I-merrem/ceftazidime/AZT/cefepime))


Leucocytosis -stable


Doful631.2 last night


Acute gallstone pancreatitis with persistent necrosis


Acute hypoxic Respiratory failure required mechanical ventilation


Tracheostomy Wednesday.


bilateral pleural effusions/pulm edema s/p Throacentesis on 6/15/2020


Severe Acute gallstone pancreatitis (not a surgical candidate at this time) with

necrosis


Acute kidney failure now requiring dialysis


Gallstones (Calculus of gallbladder with acute cholecystitis without 

obstruction)


HTN 


Intractable pain


Intractable nausea


Covid 19 negative. 


Acute on chronic anemia 


EEG: No seizure activity


Fever  - intermittent 


? Ileus with vomiting


Abd distention - U/S and CT reviewed s/p 0.4 L of opaque, debris-containing 

ascites was removed 5/6


Acute pancreatitis with persistent necrosis


Gallstone pancreatitis with necrosis. 


   -CT A/P 6/6 showed multiple pseudocysts, slight larger on the right. s/p 

drains x 3, 6/7.  + PSAE (MDRO-R Cefepime, Zosyn ANSON < 64) and yeast, 


   -s/p drain 4/27. C. parapsilosis. s/p drain 5/6 + yeast & high amylase; s/p 

additional drain on 5/8. Drains removed. 


Ascites s/p paracentesis 4/15 & 5/6. C. parapsilosis 


JED. off HD. 


A large fluid collection in the pancreatic bed has slightly decreased in size, 

described below, the pancreas itself is difficult to  visualize, which could be 

due to necrosis or obscuration of pancreatic  parenchyma from the surrounding 

fluid collection.6/15 


- 4/27 status post ROBERT drain placement + C paropsilosis. s/p additional drains 

5/8


Anemia - S/p PRBCs. 


Cholelithiasis with thickening of the gallbladder wall.


Leucocytosis improving


JED, hyperkalemia, Metabolic acidosis off dialysis


hypocalcemia 


Prediabetes


HTN


s/p trach


Hyperglycemia


severe protein-caloric malnutrition


Moderate to large left pleural effusion with atelectasis and collapse  of most 

of the left lower lobe, stable


Extensive retroperitoneal fluid collections persist. Percutaneous  drains remain

within the collections in both paracolic gutters. These  communicate with 

additional pelvic and peripancreatic collections.











PLAN================





Repeat hemoglobin in the afternoon


Type and cross for 1 unit PRBC


per ID recommendations, continue Avycaz, micafungin, daptomycin.  Nystatin to 

the groin


Negative C diff


BC from 7/15 neg to date 


7/26 PICC line removed will start PPN for 24 hours then replace PICC line other 

side


Follow surgery input regarding diet and drains, pending GJ tube placement with 

IR before restarting tube feeds.


DVT GI prophylaxis


f/u BC /resp cultures


continue DVT/GI PPX





Discussed with RN

















45 MIN CC TIME





History of Present Illness


History of Present Illness


8/2/2020


Patient seen and examined bedside.  Positive fluid balance in the past 24 hours.

 Patient's chart, labs, images were reviewed and discussed with RN





8/1/2020


No acute events overnight.  Seen and examined at bedside.  Patient had a fever 

100.2.  Negative fluid balance of 150 cc.  Patient's chart, labs, images were 

reviewed and discussed with RN





7/31/2020


Patient seen and examined at bedside.  No acute events overnight.  Positive 

fluid balance 633.  Patient's chart, labs, images were reviewed and discussed 

with RN








7/30/2020


Patient seen and examined bedside.  No acute events overnight.  Patient's chart,

labs, images were reviewed and discussed with RN








7/28/2020


Patient seen and examined bedside.  Patient appears to be in no obvious pain.  

All drains have been adequately draining.  Patient continues to be on TPN.  

Chart labs and imaging was reviewed.  Negative fluid balance of 270 cc


7/27/2020


Patient seen and examined in the ICU.  Patient still requires extensive care.  

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  Chart, labs, imaging was 

reviewed.  UOP with + 500cc balance.








7/25 /2020





Patient seen in and examined in the ICU


She is still extremely critically ill


Appears weak, frail, and pale


Better color today with improved eye contact and expression


Discussed with RN


Chart reviewed











 








7/21/2020


Patient seen and examined in the ICU


She is still extremely critically ill


Appears extremely weak frail and pale


Discussed with RN


Chart reviewed





Vitals/I&O


Vitals/I&O:





                                   Vital Signs








  Date Time  Temp Pulse Resp B/P (MAP) Pulse Ox O2 Delivery O2 Flow Rate FiO2


 


8/2/20 11:34     97 Room Air  


 


8/2/20 11:08   31     


 


8/2/20 11:00 99.2 119  163/69 (100)    





 99.2       


 


8/1/20 21:40       2.0 














                                    I & O   


 


 8/1/20 8/1/20 8/2/20





 15:00 23:00 07:00


 


Intake Total 350 ml 2900 ml 2312 ml


 


Output Total 375 ml 880 ml 645 ml


 


Balance -25 ml 2020 ml 1667 ml











Physical Exam


Physical Exam:


GENERAL: pt in bed, appears weak -comfortable -alert


HEENT: Pupils equal, oral cavity dry. OC/Op - Dry


NECK:  Tracheostomy - no JVD


LUNGS: Diminished aeration bases,  CT on left 


HEART:  S1, S2, 


ABDOMEN: Less Distended mild- bowel sounds hypoactive, soft, richardson x 2, ROBERT 

drains, G-J tube. Some drainage around R quad tube again and mild insertion site

irritation


: [place] in place 


EXTREMITIES: Trace generalized edema, no cyanosis. Yeast in groin area


SKIN: warm touch. No signs of rash.  


LUE- Previous PICC site without signs of complications. PIV s clean


NEURO: - Alert and responsive


PICC RUE - clean


General:  Alert, Cooperative, No acute distress


Heart:  Other (distant heart sounds, tachycardic)


Lungs:  Crackles


Abdomen:  Soft, No tenderness, Other (drain with brownish drainage, wound vac in

place)


Extremities:  Other (Diffuse edema)


Skin:  No rashes, No significant lesion





Labs


Labs:





Laboratory Tests








Test


 8/1/20


18:08 8/1/20


23:58 8/2/20


05:40 8/2/20


05:42


 


Glucose (Fingerstick)


 142 mg/dL


(70-99) 135 mg/dL


(70-99) 


 147 mg/dL


(70-99)


 


White Blood Count


 


 


 13.6 x10^3/uL


(4.0-11.0) 





 


Red Blood Count


 


 


 2.50 x10^6/uL


(3.50-5.40) 





 


Hemoglobin


 


 


 6.9 g/dL


(12.0-15.5) 





 


Hematocrit


 


 


 21.8 %


(36.0-47.0) 





 


Mean Corpuscular Volume   87 fL ()  


 


Mean Corpuscular Hemoglobin   28 pg (25-35)  


 


Mean Corpuscular Hemoglobin


Concent 


 


 32 g/dL


(31-37) 





 


Red Cell Distribution Width


 


 


 17.4 %


(11.5-14.5) 





 


Platelet Count


 


 


 608 x10^3/uL


(140-400) 





 


Neutrophils (%) (Auto)   80 % (31-73)  


 


Lymphocytes (%) (Auto)   11 % (24-48)  


 


Monocytes (%) (Auto)   7 % (0-9)  


 


Eosinophils (%) (Auto)   2 % (0-3)  


 


Basophils (%) (Auto)   0 % (0-3)  


 


Neutrophils # (Auto)


 


 


 10.9 x10^3/uL


(1.8-7.7) 





 


Lymphocytes # (Auto)


 


 


 1.5 x10^3/uL


(1.0-4.8) 





 


Monocytes # (Auto)


 


 


 0.9 x10^3/uL


(0.0-1.1) 





 


Eosinophils # (Auto)


 


 


 0.2 x10^3/uL


(0.0-0.7) 





 


Basophils # (Auto)


 


 


 0.1 x10^3/uL


(0.0-0.2) 





 


Sodium Level


 


 


 129 mmol/L


(136-145) 





 


Potassium Level


 


 


 4.9 mmol/L


(3.5-5.1) 





 


Chloride Level


 


 


 100 mmol/L


() 





 


Carbon Dioxide Level


 


 


 26 mmol/L


(21-32) 





 


Anion Gap   3 (6-14)  


 


Blood Urea Nitrogen   8 mg/dL (7-20)  


 


Creatinine


 


 


 0.5 mg/dL


(0.6-1.0) 





 


Estimated GFR


(Cockcroft-Gault) 


 


 131.1 


 





 


Glucose Level


 


 


 134 mg/dL


(70-99) 





 


Calcium Level


 


 


 9.9 mg/dL


(8.5-10.1) 














Assessment and Plan


Assessmemt and Plan


Problems


Medical Problems:


(1) Acute pancreatitis


Status: Acute  





(2) Cholelithiasis


Status: Acute  











Comment


Review of Relevant


I have reviewed the following items josy (where applicable) has been applied.





Justicifation of Admission Dx:


Justifications for Admission:


Justification of Admission Dx:  Yes











JACINTO MIRANDA MD                     Aug 2, 2020 12:13

## 2020-08-03 VITALS — SYSTOLIC BLOOD PRESSURE: 105 MMHG | DIASTOLIC BLOOD PRESSURE: 65 MMHG

## 2020-08-03 VITALS — SYSTOLIC BLOOD PRESSURE: 148 MMHG | DIASTOLIC BLOOD PRESSURE: 95 MMHG

## 2020-08-03 VITALS — SYSTOLIC BLOOD PRESSURE: 173 MMHG | DIASTOLIC BLOOD PRESSURE: 65 MMHG

## 2020-08-03 VITALS — SYSTOLIC BLOOD PRESSURE: 111 MMHG | DIASTOLIC BLOOD PRESSURE: 55 MMHG

## 2020-08-03 VITALS — DIASTOLIC BLOOD PRESSURE: 72 MMHG | SYSTOLIC BLOOD PRESSURE: 140 MMHG

## 2020-08-03 VITALS — DIASTOLIC BLOOD PRESSURE: 65 MMHG | SYSTOLIC BLOOD PRESSURE: 154 MMHG

## 2020-08-03 VITALS — SYSTOLIC BLOOD PRESSURE: 142 MMHG | DIASTOLIC BLOOD PRESSURE: 77 MMHG

## 2020-08-03 LAB
ALBUMIN SERPL-MCNC: 1.4 G/DL (ref 3.4–5)
ALP SERPL-CCNC: 105 U/L (ref 46–116)
ALT SERPL-CCNC: 14 U/L (ref 14–59)
ANION GAP SERPL CALC-SCNC: 7 MMOL/L (ref 6–14)
AST SERPL-CCNC: 23 U/L (ref 15–37)
BASOPHILS # BLD AUTO: 0 X10^3/UL (ref 0–0.2)
BASOPHILS NFR BLD: 0 % (ref 0–3)
BILIRUB DIRECT SERPL-MCNC: 0.2 MG/DL (ref 0–0.2)
BILIRUB SERPL-MCNC: 0.3 MG/DL (ref 0.2–1)
BUN SERPL-MCNC: 8 MG/DL (ref 7–20)
CALCIUM SERPL-MCNC: 10.7 MG/DL (ref 8.5–10.1)
CHLORIDE SERPL-SCNC: 102 MMOL/L (ref 98–107)
CO2 SERPL-SCNC: 27 MMOL/L (ref 21–32)
CREAT SERPL-MCNC: 0.7 MG/DL (ref 0.6–1)
EOSINOPHIL NFR BLD: 0.3 X10^3/UL (ref 0–0.7)
EOSINOPHIL NFR BLD: 2 % (ref 0–3)
ERYTHROCYTE [DISTWIDTH] IN BLOOD BY AUTOMATED COUNT: 17.8 % (ref 11.5–14.5)
GFR SERPLBLD BASED ON 1.73 SQ M-ARVRAT: 88.9 ML/MIN
GLUCOSE SERPL-MCNC: 140 MG/DL (ref 70–99)
HCT VFR BLD CALC: 23.5 % (ref 36–47)
HGB BLD-MCNC: 7.4 G/DL (ref 12–15.5)
LYMPHOCYTES # BLD: 1.4 X10^3/UL (ref 1–4.8)
LYMPHOCYTES NFR BLD AUTO: 10 % (ref 24–48)
MCH RBC QN AUTO: 27 PG (ref 25–35)
MCHC RBC AUTO-ENTMCNC: 32 G/DL (ref 31–37)
MCV RBC AUTO: 86 FL (ref 79–100)
MONO #: 1 X10^3/UL (ref 0–1.1)
MONOCYTES NFR BLD: 7 % (ref 0–9)
NEUT #: 11.3 X10^3/UL (ref 1.8–7.7)
NEUTROPHILS NFR BLD AUTO: 81 % (ref 31–73)
PHOSPHATE SERPL-MCNC: 4 MG/DL (ref 2.6–4.7)
PLATELET # BLD AUTO: 577 X10^3/UL (ref 140–400)
POTASSIUM SERPL-SCNC: 4.2 MMOL/L (ref 3.5–5.1)
PROT SERPL-MCNC: 4.8 G/DL (ref 6.4–8.2)
RBC # BLD AUTO: 2.73 X10^6/UL (ref 3.5–5.4)
SODIUM SERPL-SCNC: 136 MMOL/L (ref 136–145)
WBC # BLD AUTO: 14 X10^3/UL (ref 4–11)

## 2020-08-03 RX ADMIN — IPRATROPIUM BROMIDE AND ALBUTEROL SULFATE SCH ML: .5; 3 SOLUTION RESPIRATORY (INHALATION) at 15:47

## 2020-08-03 RX ADMIN — ACETAMINOPHEN PRN MG: 650 SUPPOSITORY RECTAL at 04:05

## 2020-08-03 RX ADMIN — DEXTROSE SCH MLS/HR: 50 INJECTION, SOLUTION INTRAVENOUS at 08:25

## 2020-08-03 RX ADMIN — ACETAMINOPHEN PRN MG: 650 SUPPOSITORY RECTAL at 15:24

## 2020-08-03 RX ADMIN — PANTOPRAZOLE SODIUM SCH MG: 40 INJECTION, POWDER, FOR SOLUTION INTRAVENOUS at 08:24

## 2020-08-03 RX ADMIN — ENOXAPARIN SODIUM SCH MG: 40 INJECTION SUBCUTANEOUS at 08:25

## 2020-08-03 RX ADMIN — INSULIN LISPRO SCH UNITS: 100 INJECTION, SOLUTION INTRAVENOUS; SUBCUTANEOUS at 23:19

## 2020-08-03 RX ADMIN — CEFTAZIDIME, AVIBACTAM SCH MLS/HR: 2; .5 POWDER, FOR SOLUTION INTRAVENOUS at 14:36

## 2020-08-03 RX ADMIN — FENTANYL SCH PATCH: 12.5 PATCH TRANSDERMAL at 08:25

## 2020-08-03 RX ADMIN — DAPTOMYCIN SCH MLS/HR: 500 INJECTION, POWDER, LYOPHILIZED, FOR SOLUTION INTRAVENOUS at 09:52

## 2020-08-03 RX ADMIN — IPRATROPIUM BROMIDE AND ALBUTEROL SULFATE SCH ML: .5; 3 SOLUTION RESPIRATORY (INHALATION) at 11:59

## 2020-08-03 RX ADMIN — ACETYLCYSTEINE SCH MG: 200 INHALANT RESPIRATORY (INHALATION) at 19:36

## 2020-08-03 RX ADMIN — INSULIN LISPRO SCH UNITS: 100 INJECTION, SOLUTION INTRAVENOUS; SUBCUTANEOUS at 12:00

## 2020-08-03 RX ADMIN — CEFTAZIDIME, AVIBACTAM SCH MLS/HR: 2; .5 POWDER, FOR SOLUTION INTRAVENOUS at 05:46

## 2020-08-03 RX ADMIN — IPRATROPIUM BROMIDE AND ALBUTEROL SULFATE SCH ML: .5; 3 SOLUTION RESPIRATORY (INHALATION) at 19:36

## 2020-08-03 RX ADMIN — IPRATROPIUM BROMIDE AND ALBUTEROL SULFATE SCH ML: .5; 3 SOLUTION RESPIRATORY (INHALATION) at 00:00

## 2020-08-03 RX ADMIN — IPRATROPIUM BROMIDE AND ALBUTEROL SULFATE SCH ML: .5; 3 SOLUTION RESPIRATORY (INHALATION) at 08:21

## 2020-08-03 RX ADMIN — ACETYLCYSTEINE SCH MG: 200 INHALANT RESPIRATORY (INHALATION) at 08:21

## 2020-08-03 RX ADMIN — METOPROLOL TARTRATE PRN MG: 5 INJECTION INTRAVENOUS at 15:05

## 2020-08-03 RX ADMIN — FENTANYL CITRATE PRN MCG: 50 INJECTION INTRAMUSCULAR; INTRAVENOUS at 04:05

## 2020-08-03 RX ADMIN — IPRATROPIUM BROMIDE AND ALBUTEROL SULFATE SCH ML: .5; 3 SOLUTION RESPIRATORY (INHALATION) at 03:35

## 2020-08-03 RX ADMIN — INSULIN LISPRO SCH UNITS: 100 INJECTION, SOLUTION INTRAVENOUS; SUBCUTANEOUS at 18:00

## 2020-08-03 RX ADMIN — FENTANYL CITRATE PRN MCG: 50 INJECTION INTRAMUSCULAR; INTRAVENOUS at 09:39

## 2020-08-03 RX ADMIN — FENTANYL CITRATE PRN MCG: 50 INJECTION INTRAMUSCULAR; INTRAVENOUS at 06:34

## 2020-08-03 RX ADMIN — FENTANYL CITRATE PRN MCG: 50 INJECTION INTRAMUSCULAR; INTRAVENOUS at 08:24

## 2020-08-03 RX ADMIN — INSULIN LISPRO SCH UNITS: 100 INJECTION, SOLUTION INTRAVENOUS; SUBCUTANEOUS at 06:00

## 2020-08-03 RX ADMIN — CEFTAZIDIME, AVIBACTAM SCH MLS/HR: 2; .5 POWDER, FOR SOLUTION INTRAVENOUS at 21:34

## 2020-08-03 RX ADMIN — IPRATROPIUM BROMIDE AND ALBUTEROL SULFATE SCH ML: .5; 3 SOLUTION RESPIRATORY (INHALATION) at 23:54

## 2020-08-03 NOTE — NUR
Wound Care

Wound  care consult for abd wound due too drainage from previous drain sites.  Pt has 3 open 
draining drain sites to the right side and one on the left. Surrounding skin is  red and 
excoriated. Cleansed area and applied skin prep and drainage bags  with holes cut to exact 
size of wounds to prevent further damage to skin. Incisional vac removed per Dr Flores and 
vaseline gauze and gauze placed over distal area where a small stitch is coming out. WC will 
follow up  on 8/5/2020 to check bags

## 2020-08-03 NOTE — PDOC
SURGICAL PROGRESS NOTE


Subjective


awake


d/w nursing--significant drainage and leaking from old drain site


Vital Signs





Vital Signs








  Date Time  Temp Pulse Resp B/P (MAP) Pulse Ox O2 Delivery O2 Flow Rate FiO2


 


8/3/20 08:25     95 Room Air  


 


8/3/20 08:24   30     


 


8/3/20 05:30  124  154/65 (94)    


 


8/3/20 03:00 99.3       





 99.3       








I&O











Intake and Output 


 


 8/3/20





 07:00


 


Intake Total 2635 ml


 


Output Total 1300 ml


 


Balance 1335 ml


 


 


 


IV Total 450 ml


 


Tube Feeding 1960 ml


 


Other 225 ml


 


Output Urine Total 950 ml


 


Drainage Total 350 ml


 


# Bowel Movements 5








PATIENT HAS A VILLASENOR:  Yes


General:  Cooperative, No acute distress


Abdomen:  Soft, Other (old drain site with drainage)


Labs





Laboratory Tests








Test


 8/1/20


11:48 8/1/20


18:08 8/1/20


23:58 8/2/20


05:40


 


Glucose (Fingerstick)


 147 mg/dL


(70-99) 142 mg/dL


(70-99) 135 mg/dL


(70-99) 





 


White Blood Count


 


 


 


 13.6 x10^3/uL


(4.0-11.0)


 


Red Blood Count


 


 


 


 2.50 x10^6/uL


(3.50-5.40)


 


Hemoglobin


 


 


 


 6.9 g/dL


(12.0-15.5)


 


Hematocrit


 


 


 


 21.8 %


(36.0-47.0)


 


Mean Corpuscular Volume    87 fL () 


 


Mean Corpuscular Hemoglobin    28 pg (25-35) 


 


Mean Corpuscular Hemoglobin


Concent 


 


 


 32 g/dL


(31-37)


 


Red Cell Distribution Width


 


 


 


 17.4 %


(11.5-14.5)


 


Platelet Count


 


 


 


 608 x10^3/uL


(140-400)


 


Neutrophils (%) (Auto)    80 % (31-73) 


 


Lymphocytes (%) (Auto)    11 % (24-48) 


 


Monocytes (%) (Auto)    7 % (0-9) 


 


Eosinophils (%) (Auto)    2 % (0-3) 


 


Basophils (%) (Auto)    0 % (0-3) 


 


Neutrophils # (Auto)


 


 


 


 10.9 x10^3/uL


(1.8-7.7)


 


Lymphocytes # (Auto)


 


 


 


 1.5 x10^3/uL


(1.0-4.8)


 


Monocytes # (Auto)


 


 


 


 0.9 x10^3/uL


(0.0-1.1)


 


Eosinophils # (Auto)


 


 


 


 0.2 x10^3/uL


(0.0-0.7)


 


Basophils # (Auto)


 


 


 


 0.1 x10^3/uL


(0.0-0.2)


 


Sodium Level


 


 


 


 129 mmol/L


(136-145)


 


Potassium Level


 


 


 


 4.9 mmol/L


(3.5-5.1)


 


Chloride Level


 


 


 


 100 mmol/L


()


 


Carbon Dioxide Level


 


 


 


 26 mmol/L


(21-32)


 


Anion Gap    3 (6-14) 


 


Blood Urea Nitrogen    8 mg/dL (7-20) 


 


Creatinine


 


 


 


 0.5 mg/dL


(0.6-1.0)


 


Estimated GFR


(Cockcroft-Gault) 


 


 


 131.1 





 


Glucose Level


 


 


 


 134 mg/dL


(70-99)


 


Calcium Level


 


 


 


 9.9 mg/dL


(8.5-10.1)


 


Test


 8/2/20


05:42 8/2/20


14:00 8/2/20


17:23 8/2/20


23:34


 


Glucose (Fingerstick)


 147 mg/dL


(70-99) 


 133 mg/dL


(70-99) 120 mg/dL


(70-99)


 


White Blood Count


 


 13.9 x10^3/uL


(4.0-11.0) 


 





 


Red Blood Count


 


 2.66 x10^6/uL


(3.50-5.40) 


 





 


Hemoglobin


 


 7.4 g/dL


(12.0-15.5) 


 





 


Hematocrit


 


 23.0 %


(36.0-47.0) 


 





 


Mean Corpuscular Volume  87 fL ()   


 


Mean Corpuscular Hemoglobin  28 pg (25-35)   


 


Mean Corpuscular Hemoglobin


Concent 


 32 g/dL


(31-37) 


 





 


Red Cell Distribution Width


 


 17.7 %


(11.5-14.5) 


 





 


Platelet Count


 


 621 x10^3/uL


(140-400) 


 





 


Test


 8/3/20


05:25 8/3/20


06:02 


 





 


White Blood Count


 14.0 x10^3/uL


(4.0-11.0) 


 


 





 


Red Blood Count


 2.73 x10^6/uL


(3.50-5.40) 


 


 





 


Hemoglobin


 7.4 g/dL


(12.0-15.5) 


 


 





 


Hematocrit


 23.5 %


(36.0-47.0) 


 


 





 


Mean Corpuscular Volume 86 fL ()    


 


Mean Corpuscular Hemoglobin 27 pg (25-35)    


 


Mean Corpuscular Hemoglobin


Concent 32 g/dL


(31-37) 


 


 





 


Red Cell Distribution Width


 17.8 %


(11.5-14.5) 


 


 





 


Platelet Count


 577 x10^3/uL


(140-400) 


 


 





 


Neutrophils (%) (Auto) 81 % (31-73)    


 


Lymphocytes (%) (Auto) 10 % (24-48)    


 


Monocytes (%) (Auto) 7 % (0-9)    


 


Eosinophils (%) (Auto) 2 % (0-3)    


 


Basophils (%) (Auto) 0 % (0-3)    


 


Neutrophils # (Auto)


 11.3 x10^3/uL


(1.8-7.7) 


 


 





 


Lymphocytes # (Auto)


 1.4 x10^3/uL


(1.0-4.8) 


 


 





 


Monocytes # (Auto)


 1.0 x10^3/uL


(0.0-1.1) 


 


 





 


Eosinophils # (Auto)


 0.3 x10^3/uL


(0.0-0.7) 


 


 





 


Basophils # (Auto)


 0.0 x10^3/uL


(0.0-0.2) 


 


 





 


Sodium Level


 136 mmol/L


(136-145) 


 


 





 


Potassium Level


 4.2 mmol/L


(3.5-5.1) 


 


 





 


Chloride Level


 102 mmol/L


() 


 


 





 


Carbon Dioxide Level


 27 mmol/L


(21-32) 


 


 





 


Anion Gap 7 (6-14)    


 


Blood Urea Nitrogen 8 mg/dL (7-20)    


 


Creatinine


 0.7 mg/dL


(0.6-1.0) 


 


 





 


Estimated GFR


(Cockcroft-Gault) 88.9 


 


 


 





 


Glucose Level


 140 mg/dL


(70-99) 


 


 





 


Calcium Level


 10.7 mg/dL


(8.5-10.1) 


 


 





 


Glucose (Fingerstick)


 


 143 mg/dL


(70-99) 


 











Laboratory Tests








Test


 8/2/20


14:00 8/2/20


17:23 8/2/20


23:34 8/3/20


05:25


 


White Blood Count


 13.9 x10^3/uL


(4.0-11.0) 


 


 14.0 x10^3/uL


(4.0-11.0)


 


Red Blood Count


 2.66 x10^6/uL


(3.50-5.40) 


 


 2.73 x10^6/uL


(3.50-5.40)


 


Hemoglobin


 7.4 g/dL


(12.0-15.5) 


 


 7.4 g/dL


(12.0-15.5)


 


Hematocrit


 23.0 %


(36.0-47.0) 


 


 23.5 %


(36.0-47.0)


 


Mean Corpuscular Volume 87 fL ()    86 fL () 


 


Mean Corpuscular Hemoglobin 28 pg (25-35)    27 pg (25-35) 


 


Mean Corpuscular Hemoglobin


Concent 32 g/dL


(31-37) 


 


 32 g/dL


(31-37)


 


Red Cell Distribution Width


 17.7 %


(11.5-14.5) 


 


 17.8 %


(11.5-14.5)


 


Platelet Count


 621 x10^3/uL


(140-400) 


 


 577 x10^3/uL


(140-400)


 


Glucose (Fingerstick)


 


 133 mg/dL


(70-99) 120 mg/dL


(70-99) 





 


Neutrophils (%) (Auto)    81 % (31-73) 


 


Lymphocytes (%) (Auto)    10 % (24-48) 


 


Monocytes (%) (Auto)    7 % (0-9) 


 


Eosinophils (%) (Auto)    2 % (0-3) 


 


Basophils (%) (Auto)    0 % (0-3) 


 


Neutrophils # (Auto)


 


 


 


 11.3 x10^3/uL


(1.8-7.7)


 


Lymphocytes # (Auto)


 


 


 


 1.4 x10^3/uL


(1.0-4.8)


 


Monocytes # (Auto)


 


 


 


 1.0 x10^3/uL


(0.0-1.1)


 


Eosinophils # (Auto)


 


 


 


 0.3 x10^3/uL


(0.0-0.7)


 


Basophils # (Auto)


 


 


 


 0.0 x10^3/uL


(0.0-0.2)


 


Sodium Level


 


 


 


 136 mmol/L


(136-145)


 


Potassium Level


 


 


 


 4.2 mmol/L


(3.5-5.1)


 


Chloride Level


 


 


 


 102 mmol/L


()


 


Carbon Dioxide Level


 


 


 


 27 mmol/L


(21-32)


 


Anion Gap    7 (6-14) 


 


Blood Urea Nitrogen    8 mg/dL (7-20) 


 


Creatinine


 


 


 


 0.7 mg/dL


(0.6-1.0)


 


Estimated GFR


(Cockcroft-Gault) 


 


 


 88.9 





 


Glucose Level


 


 


 


 140 mg/dL


(70-99)


 


Calcium Level


 


 


 


 10.7 mg/dL


(8.5-10.1)


 


Test


 8/3/20


06:02 


 


 





 


Glucose (Fingerstick)


 143 mg/dL


(70-99) 


 


 











Problem List


Problems


Medical Problems:


(1) Acute pancreatitis


Status: Acute  





(2) Cholelithiasis


Status: Acute  








Assessment/Plan


will ask wound care to eval for better skin protection to wound site





Justicifation of Admission Dx:


Justifications for Admission:


Justification of Admission Dx:  Yes











SAURAV NASH             Aug 3, 2020 08:45

## 2020-08-03 NOTE — NUR
SS following up with discharge planning. SS reviewed pt chart and discussed with pt RN. All 
drains pulled except for GJ tube. Pt currently on tube feeds. Pt on room air. Wound vac 
removed. Pt has ostomy bags on drainage sites. Pt currently wearing speaking valve 
intermittently. Pt on IV Daptomycin, IV Micafungin, and IV Avycaz. Pt max assist for PT/OT 
and staff. PT/OT recommending LTACH. Pt self pay. Med Assist attempting to work with pt's 
family to complete disability application. SS will continue to follow for discharge 
planning.

## 2020-08-03 NOTE — PDOC
Objective:


Objective:


D/w nurse - stable GI-wise.


Drainage from previous drain site - wound care to see.


Vital Signs:





                                   Vital Signs








  Date Time  Temp Pulse Resp B/P (MAP) Pulse Ox O2 Delivery O2 Flow Rate FiO2


 


8/3/20 09:39     99 Room Air  


 


8/3/20 08:24   30     


 


8/3/20 07:00 98.8 114  111/55 (73)    





 98.8       








Labs:





Laboratory Tests








Test


 8/2/20


14:00 8/2/20


17:23 8/2/20


23:34 8/3/20


05:25


 


White Blood Count 13.9 x10^3/uL    14.0 x10^3/uL 


 


Red Blood Count 2.66 x10^6/uL    2.73 x10^6/uL 


 


Hemoglobin 7.4 g/dL    7.4 g/dL 


 


Hematocrit 23.0 %    23.5 % 


 


Mean Corpuscular Volume 87 fL    86 fL 


 


Mean Corpuscular Hemoglobin 28 pg    27 pg 


 


Mean Corpuscular Hemoglobin


Concent 32 g/dL 


 


 


 32 g/dL 





 


Red Cell Distribution Width 17.7 %    17.8 % 


 


Platelet Count 621 x10^3/uL    577 x10^3/uL 


 


Glucose (Fingerstick)  133 mg/dL  120 mg/dL  


 


Neutrophils (%) (Auto)    81 % 


 


Lymphocytes (%) (Auto)    10 % 


 


Monocytes (%) (Auto)    7 % 


 


Eosinophils (%) (Auto)    2 % 


 


Basophils (%) (Auto)    0 % 


 


Neutrophils # (Auto)    11.3 x10^3/uL 


 


Lymphocytes # (Auto)    1.4 x10^3/uL 


 


Monocytes # (Auto)    1.0 x10^3/uL 


 


Eosinophils # (Auto)    0.3 x10^3/uL 


 


Basophils # (Auto)    0.0 x10^3/uL 


 


Sodium Level    136 mmol/L 


 


Potassium Level    4.2 mmol/L 


 


Chloride Level    102 mmol/L 


 


Carbon Dioxide Level    27 mmol/L 


 


Anion Gap    7 


 


Blood Urea Nitrogen    8 mg/dL 


 


Creatinine    0.7 mg/dL 


 


Estimated GFR


(Cockcroft-Gault) 


 


 


 88.9 





 


Glucose Level    140 mg/dL 


 


Calcium Level    10.7 mg/dL 


 


Test


 8/3/20


06:02 


 


 





 


Glucose (Fingerstick) 143 mg/dL    











PE:





GEN: chronically ill


LUNGS: RT present suctioning when I saw


HEART: tachycardic


ABD: drains


NEURO/PSYCH: awake





A/P:


S/p pancreatic necrosectomy, complications





--


Continue support.





Justicifation of Admission Dx:


Justifications for Admission:


Justification of Admission Dx:  Yes











RAGHAVENDRA MURCIA          Aug 3, 2020 10:55

## 2020-08-03 NOTE — PDOC
PROGRESS NOTES


Assessment


Problems


Medical Problems:


(1) Acute pancreatitis


Status: Acute  





(2) Cholelithiasis


Status: Acute  





Critical illness neuromyopathy


Single seizure on 3/6, no recurrence.


Metabolic encephalopathy.


Fevers.


Metabolic acidosis.


Diffuse pulmonary infiltrate.


Pleural effusion.


Pancreatitis, necrotizing.


Gallstone.


Leukocytosis.


Lymphopenia.


Electrolytes imbalances.


Hyperglycemia.


DM.


HTN.


HLD.


Anemia.


Abnormal CXR.


Obesity.


Ascites and pleural effusion


Ileus with vomiting


Anemia 


JED


Hyperkalemia


Metabolic acidosis


Hypertension


S/P trache, back on vent


Anemia


S/P IR drain placement, 6/7


She had exploratory laparotomy, lysis of adhesions, subtotal cholecystectomy 

with cholangiogram, gastrojejunostomy tube placement, pancreatic necrosectomy on

6/30


Plan


Keppra if she has further seizures.


Treat medical diseases.


Subjective


Indicates no pain


Objective





Vital Signs








  Date Time  Temp Pulse Resp B/P (MAP) Pulse Ox O2 Delivery O2 Flow Rate FiO2


 


8/3/20 08:25     95 Room Air  


 


8/3/20 08:24   30     


 


8/3/20 05:30  124  154/65 (94)    


 


8/3/20 03:00 99.3       





 99.3       














Intake and Output 


 


 8/3/20





 07:00


 


Intake Total 2635 ml


 


Output Total 1300 ml


 


Balance 1335 ml


 


 


 


IV Total 450 ml


 


Tube Feeding 1960 ml


 


Other 225 ml


 


Output Urine Total 950 ml


 


Drainage Total 350 ml


 


# Bowel Movements 5








PHYSICAL EXAM


Alert, off vent, trache capped, attempts to verbalize, follows commands


PERRL.


EOMI.


CN: no focal findings.


Muscle tone: normal.


Muscle strength:  3/5 strength


DTR: 1+


Plantar reflex: Silent


Gait: not examined in bed.


Sensory exam: nonfocal


Cerebellar:  not testable


Review of Relevant


I have reviewed the following items josy (where applicable) has been applied.


Labs





Laboratory Tests








Test


 8/1/20


11:48 8/1/20


18:08 8/1/20


23:58 8/2/20


05:40


 


Glucose (Fingerstick)


 147 mg/dL


(70-99) 142 mg/dL


(70-99) 135 mg/dL


(70-99) 





 


White Blood Count


 


 


 


 13.6 x10^3/uL


(4.0-11.0)


 


Red Blood Count


 


 


 


 2.50 x10^6/uL


(3.50-5.40)


 


Hemoglobin


 


 


 


 6.9 g/dL


(12.0-15.5)


 


Hematocrit


 


 


 


 21.8 %


(36.0-47.0)


 


Mean Corpuscular Volume    87 fL () 


 


Mean Corpuscular Hemoglobin    28 pg (25-35) 


 


Mean Corpuscular Hemoglobin


Concent 


 


 


 32 g/dL


(31-37)


 


Red Cell Distribution Width


 


 


 


 17.4 %


(11.5-14.5)


 


Platelet Count


 


 


 


 608 x10^3/uL


(140-400)


 


Neutrophils (%) (Auto)    80 % (31-73) 


 


Lymphocytes (%) (Auto)    11 % (24-48) 


 


Monocytes (%) (Auto)    7 % (0-9) 


 


Eosinophils (%) (Auto)    2 % (0-3) 


 


Basophils (%) (Auto)    0 % (0-3) 


 


Neutrophils # (Auto)


 


 


 


 10.9 x10^3/uL


(1.8-7.7)


 


Lymphocytes # (Auto)


 


 


 


 1.5 x10^3/uL


(1.0-4.8)


 


Monocytes # (Auto)


 


 


 


 0.9 x10^3/uL


(0.0-1.1)


 


Eosinophils # (Auto)


 


 


 


 0.2 x10^3/uL


(0.0-0.7)


 


Basophils # (Auto)


 


 


 


 0.1 x10^3/uL


(0.0-0.2)


 


Sodium Level


 


 


 


 129 mmol/L


(136-145)


 


Potassium Level


 


 


 


 4.9 mmol/L


(3.5-5.1)


 


Chloride Level


 


 


 


 100 mmol/L


()


 


Carbon Dioxide Level


 


 


 


 26 mmol/L


(21-32)


 


Anion Gap    3 (6-14) 


 


Blood Urea Nitrogen    8 mg/dL (7-20) 


 


Creatinine


 


 


 


 0.5 mg/dL


(0.6-1.0)


 


Estimated GFR


(Cockcroft-Gault) 


 


 


 131.1 





 


Glucose Level


 


 


 


 134 mg/dL


(70-99)


 


Calcium Level


 


 


 


 9.9 mg/dL


(8.5-10.1)


 


Test


 8/2/20


05:42 8/2/20


14:00 8/2/20


17:23 8/2/20


23:34


 


Glucose (Fingerstick)


 147 mg/dL


(70-99) 


 133 mg/dL


(70-99) 120 mg/dL


(70-99)


 


White Blood Count


 


 13.9 x10^3/uL


(4.0-11.0) 


 





 


Red Blood Count


 


 2.66 x10^6/uL


(3.50-5.40) 


 





 


Hemoglobin


 


 7.4 g/dL


(12.0-15.5) 


 





 


Hematocrit


 


 23.0 %


(36.0-47.0) 


 





 


Mean Corpuscular Volume  87 fL ()   


 


Mean Corpuscular Hemoglobin  28 pg (25-35)   


 


Mean Corpuscular Hemoglobin


Concent 


 32 g/dL


(31-37) 


 





 


Red Cell Distribution Width


 


 17.7 %


(11.5-14.5) 


 





 


Platelet Count


 


 621 x10^3/uL


(140-400) 


 





 


Test


 8/3/20


05:25 8/3/20


06:02 


 





 


White Blood Count


 14.0 x10^3/uL


(4.0-11.0) 


 


 





 


Red Blood Count


 2.73 x10^6/uL


(3.50-5.40) 


 


 





 


Hemoglobin


 7.4 g/dL


(12.0-15.5) 


 


 





 


Hematocrit


 23.5 %


(36.0-47.0) 


 


 





 


Mean Corpuscular Volume 86 fL ()    


 


Mean Corpuscular Hemoglobin 27 pg (25-35)    


 


Mean Corpuscular Hemoglobin


Concent 32 g/dL


(31-37) 


 


 





 


Red Cell Distribution Width


 17.8 %


(11.5-14.5) 


 


 





 


Platelet Count


 577 x10^3/uL


(140-400) 


 


 





 


Neutrophils (%) (Auto) 81 % (31-73)    


 


Lymphocytes (%) (Auto) 10 % (24-48)    


 


Monocytes (%) (Auto) 7 % (0-9)    


 


Eosinophils (%) (Auto) 2 % (0-3)    


 


Basophils (%) (Auto) 0 % (0-3)    


 


Neutrophils # (Auto)


 11.3 x10^3/uL


(1.8-7.7) 


 


 





 


Lymphocytes # (Auto)


 1.4 x10^3/uL


(1.0-4.8) 


 


 





 


Monocytes # (Auto)


 1.0 x10^3/uL


(0.0-1.1) 


 


 





 


Eosinophils # (Auto)


 0.3 x10^3/uL


(0.0-0.7) 


 


 





 


Basophils # (Auto)


 0.0 x10^3/uL


(0.0-0.2) 


 


 





 


Sodium Level


 136 mmol/L


(136-145) 


 


 





 


Potassium Level


 4.2 mmol/L


(3.5-5.1) 


 


 





 


Chloride Level


 102 mmol/L


() 


 


 





 


Carbon Dioxide Level


 27 mmol/L


(21-32) 


 


 





 


Anion Gap 7 (6-14)    


 


Blood Urea Nitrogen 8 mg/dL (7-20)    


 


Creatinine


 0.7 mg/dL


(0.6-1.0) 


 


 





 


Estimated GFR


(Cockcroft-Gault) 88.9 


 


 


 





 


Glucose Level


 140 mg/dL


(70-99) 


 


 





 


Calcium Level


 10.7 mg/dL


(8.5-10.1) 


 


 





 


Glucose (Fingerstick)


 


 143 mg/dL


(70-99) 


 











Laboratory Tests








Test


 8/2/20


14:00 8/2/20


17:23 8/2/20


23:34 8/3/20


05:25


 


White Blood Count


 13.9 x10^3/uL


(4.0-11.0) 


 


 14.0 x10^3/uL


(4.0-11.0)


 


Red Blood Count


 2.66 x10^6/uL


(3.50-5.40) 


 


 2.73 x10^6/uL


(3.50-5.40)


 


Hemoglobin


 7.4 g/dL


(12.0-15.5) 


 


 7.4 g/dL


(12.0-15.5)


 


Hematocrit


 23.0 %


(36.0-47.0) 


 


 23.5 %


(36.0-47.0)


 


Mean Corpuscular Volume 87 fL ()    86 fL () 


 


Mean Corpuscular Hemoglobin 28 pg (25-35)    27 pg (25-35) 


 


Mean Corpuscular Hemoglobin


Concent 32 g/dL


(31-37) 


 


 32 g/dL


(31-37)


 


Red Cell Distribution Width


 17.7 %


(11.5-14.5) 


 


 17.8 %


(11.5-14.5)


 


Platelet Count


 621 x10^3/uL


(140-400) 


 


 577 x10^3/uL


(140-400)


 


Glucose (Fingerstick)


 


 133 mg/dL


(70-99) 120 mg/dL


(70-99) 





 


Neutrophils (%) (Auto)    81 % (31-73) 


 


Lymphocytes (%) (Auto)    10 % (24-48) 


 


Monocytes (%) (Auto)    7 % (0-9) 


 


Eosinophils (%) (Auto)    2 % (0-3) 


 


Basophils (%) (Auto)    0 % (0-3) 


 


Neutrophils # (Auto)


 


 


 


 11.3 x10^3/uL


(1.8-7.7)


 


Lymphocytes # (Auto)


 


 


 


 1.4 x10^3/uL


(1.0-4.8)


 


Monocytes # (Auto)


 


 


 


 1.0 x10^3/uL


(0.0-1.1)


 


Eosinophils # (Auto)


 


 


 


 0.3 x10^3/uL


(0.0-0.7)


 


Basophils # (Auto)


 


 


 


 0.0 x10^3/uL


(0.0-0.2)


 


Sodium Level


 


 


 


 136 mmol/L


(136-145)


 


Potassium Level


 


 


 


 4.2 mmol/L


(3.5-5.1)


 


Chloride Level


 


 


 


 102 mmol/L


()


 


Carbon Dioxide Level


 


 


 


 27 mmol/L


(21-32)


 


Anion Gap    7 (6-14) 


 


Blood Urea Nitrogen    8 mg/dL (7-20) 


 


Creatinine


 


 


 


 0.7 mg/dL


(0.6-1.0)


 


Estimated GFR


(Cockcroft-Gault) 


 


 


 88.9 





 


Glucose Level


 


 


 


 140 mg/dL


(70-99)


 


Calcium Level


 


 


 


 10.7 mg/dL


(8.5-10.1)


 


Test


 8/3/20


06:02 


 


 





 


Glucose (Fingerstick)


 143 mg/dL


(70-99) 


 


 











Microbiology


7/26/20 Gram Stain - Final, Complete


          


7/26/20 Aerobic Culture - Final, Complete


          


7/26/20 Blood Culture - Final, Complete


          NO GROWTH AFTER 5 DAYS


7/26/20 Urine Culture - Final, Complete


          


7/21/20 Gram Stain - Final, Complete


          


7/21/20 Aerobic and Anaerobic Culture - Final, Complete


          


7/19/20 Gram Stain Evaluation - Final, Complete


          


7/19/20 Respiratory Culture - Final, Complete


          


7/19/20 Antimicrobic Susceptibility - Final, Complete


          


6/30/20 Gram Stain - Final, Complete


          


6/30/20 Aerobic and Anaerobic Culture - Final, Complete


          


6/30/20 Antimicrobic Susceptibility - Final, Complete


Medications





Current Medications


Sodium Chloride 1,000 ml @  1,000 mls/hr Q1H IV  Last administered on 3/16/20at 

03:00;  Start 3/16/20 at 03:00;  Stop 3/16/20 at 03:59;  Status DC


Ondansetron HCl (Zofran) 4 mg 1X  ONCE IVP  Last administered on 3/16/20at 

03:27;  Start 3/16/20 at 03:00;  Stop 3/16/20 at 03:01;  Status DC


Morphine Sulfate (Morphine Sulfate) 4 mg 1X  ONCE IV ;  Start 3/16/20 at 03:00; 

Stop 3/16/20 at 03:01;  Status Cancel


Ketorolac Tromethamine (Toradol 30mg Vial) 30 mg 1X  ONCE IV  Last administered 

on 3/16/20at 02:54;  Start 3/16/20 at 03:00;  Stop 3/16/20 at 03:01;  Status DC


Fentanyl Citrate (Fentanyl 2ml Vial) 25 mcg 1X  ONCE IVP  Last administered on 

3/16/20at 03:23;  Start 3/16/20 at 03:30;  Stop 3/16/20 at 03:31;  Status DC


Fentanyl Citrate (Fentanyl 2ml Vial) 100 mcg STK-MED ONCE .ROUTE ;  Start 

3/16/20 at 03:18;  Stop 3/16/20 at 03:18;  Status DC


Iohexol (Omnipaque 350 Mg/ml) 90 ml 1X  ONCE IV  Last administered on 3/16/20at 

03:25;  Start 3/16/20 at 03:30;  Stop 3/16/20 at 03:31;  Status DC


Info (CONTRAST GIVEN -- Rx MONITORING) 1 each PRN DAILY  PRN MC SEE COMMENTS;  

Start 3/16/20 at 03:30;  Stop 3/18/20 at 03:29;  Status DC


Hydromorphone HCl (Dilaudid) 0.5 mg 1X  ONCE IV  Last administered on 3/16/20at 

03:55;  Start 3/16/20 at 04:30;  Stop 3/16/20 at 04:32;  Status DC


Ondansetron HCl (Zofran) 4 mg PRN Q8HRS  PRN IV NAUSEA/VOMITING 1ST CHOICE;  

Start 3/16/20 at 05:00;  Stop 3/16/20 at 09:27;  Status DC


Morphine Sulfate (Morphine Sulfate) 2 mg PRN Q2HR  PRN IV SEVERE PAIN 7-10 Last 

administered on 3/17/20at 12:26;  Start 3/16/20 at 05:00;  Stop 3/17/20 at 

14:15;  Status DC


Sodium Chloride 1,000 ml @  125 mls/hr Q8H IV  Last administered on 3/16/20at 

20:56;  Start 3/16/20 at 05:00;  Stop 3/17/20 at 04:59;  Status DC


Hydromorphone HCl (Dilaudid) 0.5 mg PRN Q3HRS  PRN IV SEVERE PAIN 7-10 Last 

administered on 3/17/20at 10:06;  Start 3/16/20 at 05:00;  Stop 3/17/20 at 

12:01;  Status DC


Piperacillin Sod/ Tazobactam Sod 4.5 gm/Sodium Chloride 100 ml @  200 mls/hr 1X 

ONCE IV  Last administered on 3/16/20at 05:44;  Start 3/16/20 at 06:00;  Stop 

3/16/20 at 06:29;  Status DC


Ondansetron HCl (Zofran) 4 mg PRN Q4HRS  PRN IV NAUSEA/VOMITING 1ST CHOICE Last 

administered on 8/1/20at 06:39;  Start 3/16/20 at 09:30


Insulin Human Lispro (HumaLOG) 0-9 UNITS Q6HRS SQ  Last administered on 

7/31/20at 12:15;  Start 3/16/20 at 09:30


Dextrose (Dextrose 50%-Water Syringe) 12.5 gm PRN Q15MIN  PRN IV SEE COMMENTS;  

Start 3/16/20 at 09:30


Pantoprazole Sodium (PROTONIX VIAL for IV PUSH) 40 mg DAILYAC IVP  Last admini

stered on 8/3/20at 08:24;  Start 3/16/20 at 11:30


Prochlorperazine Edisylate (Compazine) 10 mg PRN Q6HRS  PRN IV NAUSEA/VOMITING, 

2nd CHOICE Last administered on 7/27/20at 09:49;  Start 3/16/20 at 17:45


Atenolol (Tenormin) 100 mg DAILY PO ;  Start 3/17/20 at 09:00;  Stop 3/16/20 at 

20:08;  Status DC


Metoprolol Tartrate (Lopressor Vial) 2.5 mg Q6HRS IVP  Last administered on 

3/17/20at 05:51;  Start 3/16/20 at 20:15;  Stop 3/17/20 at 10:02;  Status DC


Metoprolol Tartrate (Lopressor Vial) 5 mg Q6HRS IVP  Last administered on 

3/26/20at 00:12;  Start 3/17/20 at 10:15;  Stop 3/28/20 at 08:48;  Status DC


Hydromorphone HCl (Dilaudid) 1 mg PRN Q3HRS  PRN IV SEVERE PAIN 7-10 Last 

administered on 3/23/20at 05:13;  Start 3/17/20 at 12:00;  Stop 3/31/20 at 

00:25;  Status DC


Lidocaine HCl (Buffered Lidocaine 1%) 3 ml STK-MED ONCE .ROUTE ;  Start 3/17/20 

at 12:55;  Stop 3/17/20 at 12:56;  Status DC


Albumin Human 500 ml @  125 mls/hr 1X  ONCE IV  Last administered on 3/17/20at 

14:33;  Start 3/17/20 at 14:30;  Stop 3/17/20 at 18:32;  Status DC


Norepinephrine Bitartrate 8 mg/ Dextrose 258 ml @  17.299 mls/ hr CONT  PRN IV 

PER PROTOCOL Last administered on 4/14/20at 12:48;  Start 3/17/20 at 15:30;  

Stop 4/17/20 at 09:19;  Status DC


Sodium Chloride 1,000 ml @  125 mls/hr Q8H IV  Last administered on 3/17/20at 

21:04;  Start 3/17/20 at 16:00;  Stop 3/18/20 at 02:42;  Status DC


Albumin Human 500 ml @  125 mls/hr PRN BID  PRN IV After every 2L NSS & BP < 

90mm Last administered on 6/30/20at 16:06;  Start 3/17/20 at 16:00;  Stop 7/3/20

at 09:30;  Status DC


Iohexol (Omnipaque 300 Mg/ml) 60 ml 1X  ONCE IV  Last administered on 3/17/20at 

17:20;  Start 3/17/20 at 17:00;  Stop 3/17/20 at 17:01;  Status DC


Info (CONTRAST GIVEN -- Rx MONITORING) 1 each PRN DAILY  PRN MC SEE COMMENTS;  

Start 3/17/20 at 17:00;  Stop 3/19/20 at 16:59;  Status DC


Meropenem 1 gm/ Sodium Chloride 100 ml @  200 mls/hr Q8HRS IV  Last administered

on 3/18/20at 05:45;  Start 3/17/20 at 20:00;  Stop 3/18/20 at 08:48;  Status DC


Furosemide (Lasix) 40 mg 1X  ONCE IVP  Last administered on 3/17/20at 22:12;  

Start 3/17/20 at 22:30;  Stop 3/17/20 at 22:31;  Status DC


Calcium Chloride 1000 mg/Sodium Chloride 110 ml @  220 mls/hr 1X  ONCE IV  Last 

administered on 3/17/20at 22:11;  Start 3/17/20 at 22:30;  Stop 3/17/20 at 

22:59;  Status DC


Albuterol Sulfate (Ventolin Neb Soln) 2.5 mg 1X  ONCE NEB  Last administered on 

3/18/20at 00:56;  Start 3/17/20 at 22:30;  Stop 3/17/20 at 22:31;  Status DC


Insulin Human Regular (HumuLIN R VIAL) 5 unit 1X  ONCE IV  Last administered on 

3/17/20at 22:14;  Start 3/17/20 at 22:30;  Stop 3/17/20 at 22:31;  Status DC


Magnesium Sulfate 50 ml @ 25 mls/hr 1X  ONCE IV  Last administered on 3/18/20at 

02:57;  Start 3/18/20 at 03:00;  Stop 3/18/20 at 04:59;  Status DC


Calcium Gluconate 1000 mg/Sodium Chloride 110 ml @  220 mls/hr 1X  ONCE IV  Last

administered on 3/18/20at 02:46;  Start 3/18/20 at 03:00;  Stop 3/18/20 at 

03:29;  Status DC


Sodium Chloride 1,000 ml @  200 mls/hr Q5H IV  Last administered on 3/18/20at 

02:46;  Start 3/18/20 at 03:00;  Stop 3/18/20 at 10:21;  Status DC


Calcium Gluconate 1000 mg/Sodium Chloride 110 ml @  220 mls/hr 1X  ONCE IV  Last

administered on 3/18/20at 03:21;  Start 3/18/20 at 03:30;  Stop 3/18/20 at 

03:59;  Status DC


Sodium Bicarbonate 50 meq/Sodium Chloride 1,050 ml @  75 mls/hr Q14H IV  Last 

administered on 3/22/20at 21:10;  Start 3/18/20 at 07:30;  Stop 3/23/20 at 

10:28;  Status DC


Calcium Gluconate 2000 mg/Sodium Chloride 120 ml @  220 mls/hr 1X  ONCE IV  Last

administered on 3/18/20at 09:05;  Start 3/18/20 at 07:30;  Stop 3/18/20 at 

08:02;  Status DC


Lidocaine HCl (Xylocaine-Mpf 1% 2ml Vial) 2 ml STK-MED ONCE .ROUTE ;  Start 

3/18/20 at 08:47;  Stop 3/18/20 at 08:47;  Status DC


Meropenem 500 mg/ Sodium Chloride 50 ml @  100 mls/hr Q12HR IV  Last 

administered on 3/23/20at 21:01;  Start 3/18/20 at 18:00;  Stop 3/24/20 at 

07:58;  Status DC


Lidocaine HCl (Buffered Lidocaine 1%) 3 ml STK-MED ONCE .ROUTE ;  Start 3/18/20 

at 09:46;  Stop 3/18/20 at 09:46;  Status DC


Lidocaine HCl (Buffered Lidocaine 1%) 6 ml 1X  ONCE INJ  Last administered on 

3/18/20at 10:26;  Start 3/18/20 at 10:15;  Stop 3/18/20 at 10:16;  Status DC


Info (Tpn Per Pharmacy) 1 each PRN DAILY  PRN MC SEE COMMENTS Last administered 

on 7/26/20at 08:31;  Start 3/18/20 at 12:00;  Stop 7/26/20 at 10:41;  Status DC


Sodium Chloride 1,000 ml @  1,000 mls/hr Q1H PRN IV hypotension;  Start 3/18/20 

at 12:07;  Stop 3/18/20 at 18:06;  Status DC


Diphenhydramine HCl (Benadryl) 25 mg 1X PRN  PRN IV ITCHING;  Start 3/18/20 at 

12:15;  Stop 3/19/20 at 12:14;  Status DC


Diphenhydramine HCl (Benadryl) 25 mg 1X PRN  PRN IV ITCHING;  Start 3/18/20 at 

12:15;  Stop 3/19/20 at 12:14;  Status DC


Sodium Chloride 1,000 ml @  400 mls/hr Q2H30M PRN IV PATENCY;  Start 3/18/20 at 

12:07;  Stop 3/19/20 at 00:06;  Status DC


Info (PHARMACY MONITORING -- do not chart) 1 each PRN DAILY  PRN MC SEE 

COMMENTS;  Start 3/18/20 at 12:15;  Stop 3/20/20 at 08:13;  Status DC


Sodium Chloride 90 meq/Calcium Gluconate 10 meq/ Multivitamins 10 ml/Chromium/ 

Copper/Manganese/ Seleni/Zn 1 ml/ Total Parenteral Nutrition/Amino 

Acids/Dextrose/ Fat Emulsion Intravenous 55.005 ml  @ 2.292 mls/hr TPN  CONT IV 

;  Start 3/18/20 at 22:00;  Stop 3/18/20 at 12:33;  Status DC


Info (Tpn Per Pharmacy) 1 each PRN DAILY  PRN MC SEE COMMENTS;  Start 3/18/20 at

12:30;  Status UNV


Sodium Chloride 90 meq/Calcium Gluconate 10 meq/ Multivitamins 10 ml/Chromium/ 

Copper/Manganese/ Seleni/Zn 0.5 ml/ Total Parenteral Nutrition/Amino 

Acids/Dextrose/ Fat Emulsion Intravenous 1,512 ml @  63 mls/hr TPN  CONT IV  

Last administered on 3/18/20at 22:06;  Start 3/18/20 at 22:00;  Stop 3/19/20 at 

21:59;  Status DC


Calcium Carbonate/ Glycine (Tums) 500 mg PRN AFTMEALHC  PRN PO INDIGESTION;  

Start 3/18/20 at 17:45;  Stop 5/13/20 at 10:25;  Status DC


Calcium Gluconate (Calcium Gluconate) 2,000 mg 1X  ONCE IVP  Last administered 

on 3/19/20at 02:19;  Start 3/19/20 at 02:15;  Stop 3/19/20 at 02:16;  Status DC


Calcium Chloride 3000 mg/Sodium Chloride 1,030 ml @  50 mls/hr [id number] IV  Last 

administered on 3/21/20at 02:17;  Start 3/19/20 at 08:00;  Stop 3/21/20 at 

15:23;  Status DC


Lorazepam (Ativan Inj) 1 mg PRN Q4HRS  PRN IVP ANXIETY / AGITATION, 2nd choic 

Last administered on 4/17/20at 03:51;  Start 3/19/20 at 09:00;  Stop 4/17/20 at 

09:19;  Status DC


Sodium Chloride 1,000 ml @  1,000 mls/hr Q1H PRN IV hypotension;  Start 3/19/20 

at 08:56;  Stop 3/19/20 at 14:55;  Status DC


Albumin Human 200 ml @  200 mls/hr 1X PRN  PRN IV Hypotension;  Start 3/19/20 at

09:00;  Stop 3/19/20 at 14:59;  Status DC


Diphenhydramine HCl (Benadryl) 25 mg 1X PRN  PRN IV ITCHING;  Start 3/19/20 at 

09:00;  Stop 3/20/20 at 08:59;  Status DC


Diphenhydramine HCl (Benadryl) 25 mg 1X PRN  PRN IV ITCHING;  Start 3/19/20 at 

09:00;  Stop 3/20/20 at 08:59;  Status DC


Sodium Chloride 1,000 ml @  400 mls/hr Q2H30M PRN IV PATENCY;  Start 3/19/20 at 

08:56;  Stop 3/19/20 at 20:55;  Status DC


Info (PHARMACY MONITORING -- do not chart) 1 each PRN DAILY  PRN MC SEE 

COMMENTS;  Start 3/19/20 at 09:00;  Status UNV


Info (PHARMACY MONITORING -- do not chart) 1 each PRN DAILY  PRN MC SEE 

COMMENTS;  Start 3/19/20 at 09:00;  Stop 3/20/20 at 08:13;  Status DC


Digoxin (Lanoxin) 500 mcg 1X  ONCE IV  Last administered on 3/19/20at 10:04;  

Start 3/19/20 at 10:00;  Stop 3/19/20 at 10:01;  Status DC


Digoxin (Lanoxin) 125 mcg 1X  ONCE IV  Last administered on 3/19/20at 17:10;  

Start 3/19/20 at 18:00;  Stop 3/19/20 at 18:01;  Status DC


Magnesium Sulfate 100 ml @  25 mls/hr 1X  ONCE IV  Last administered on 3/19

/20at 12:48;  Start 3/19/20 at 13:00;  Stop 3/19/20 at 16:59;  Status DC


Sodium Chloride 90 meq/Magnesium Sulfate 10 meq/ Calcium Gluconate 20 meq/ 

Multivitamins 10 ml/Chromium/ Copper/Manganese/ Seleni/Zn 0.5 ml/ Total 

Parenteral Nutrition/Amino Acids/Dextrose/ Fat Emulsion Intravenous 1,512 ml @  

63 mls/hr TPN  CONT IV  Last administered on 3/19/20at 22:25;  Start 3/19/20 at 

22:00;  Stop 3/20/20 at 21:59;  Status DC


Sodium Chloride 1,000 ml @  1,000 mls/hr Q1H PRN IV hypotension;  Start 3/20/20 

at 08:05;  Stop 3/20/20 at 14:04;  Status DC


Albumin Human 200 ml @  200 mls/hr 1X  ONCE IV  Last administered on 3/20/20at 

08:57;  Start 3/20/20 at 08:15;  Stop 3/20/20 at 09:14;  Status DC


Diphenhydramine HCl (Benadryl) 25 mg 1X PRN  PRN IV ITCHING;  Start 3/20/20 at 

08:15;  Stop 3/21/20 at 08:14;  Status DC


Diphenhydramine HCl (Benadryl) 25 mg 1X PRN  PRN IV ITCHING;  Start 3/20/20 at 

08:15;  Stop 3/21/20 at 08:14;  Status DC


Sodium Chloride 1,000 ml @  400 mls/hr Q2H30M PRN IV PATENCY;  Start 3/20/20 at 

08:05;  Stop 3/20/20 at 20:04;  Status DC


Info (PHARMACY MONITORING -- do not chart) 1 each PRN DAILY  PRN MC SEE 

COMMENTS;  Start 3/20/20 at 08:15;  Stop 3/24/20 at 07:57;  Status DC


Sodium Chloride 90 meq/Potassium Chloride 15 meq/ Potassium Phosphate 10 mmol/ 

Magnesium Sulfate 10 meq/Calcium Gluconate 20 meq/ Multivitamins 10 ml/Chromium/

Copper/Manganese/ Seleni/Zn 0.5 ml/ Total Parenteral Nutrition/Amino 

Acids/Dextrose/ Fat Emulsion Intravenous 1,512 ml @  63 mls/hr TPN  CONT IV  

Last administered on 3/20/20at 21:01;  Start 3/20/20 at 22:00;  Stop 3/21/20 at 

21:59;  Status DC


Potassium Chloride/Water 100 ml @  100 mls/hr 1X  ONCE IV  Last administered on 

3/20/20at 14:09;  Start 3/20/20 at 14:00;  Stop 3/20/20 at 14:59;  Status DC


Benzocaine (Hurricaine One) 1 spray 1X  ONCE MM  Last administered on 3/20/20at 

16:38;  Start 3/20/20 at 14:30;  Stop 3/20/20 at 14:31;  Status DC


Lidocaine HCl (Glydo (Lidocaine) Jelly) 1 thomas 1X  ONCE MM  Last administered on 

3/20/20at 16:38;  Start 3/20/20 at 14:30;  Stop 3/20/20 at 14:31;  Status DC


Linezolid/Dextrose 300 ml @  300 mls/hr Q12HR IV  Last administered on 3/26/20at

21:04;  Start 3/20/20 at 20:00;  Stop 3/27/20 at 07:50;  Status DC


Acetaminophen (Tylenol) 650 mg PRN Q6HRS  PRN PO MILD PAIN / TEMP;  Start 

3/21/20 at 03:30;  Stop 3/21/20 at 03:36;  Status DC


Acetaminophen (Tylenol) 650 mg PRN Q6HRS  PRN PEG MILD PAIN / TEMP Last 

administered on 4/16/20at 19:56;  Start 3/21/20 at 03:36;  Stop 5/13/20 at 

10:25;  Status DC


Sodium Chloride 1,000 ml @  1,000 mls/hr Q1H PRN IV hypotension;  Start 3/21/20 

at 07:50;  Stop 3/21/20 at 13:49;  Status DC


Albumin Human 200 ml @  200 mls/hr 1X PRN  PRN IV Hypotension;  Start 3/21/20 at

08:00;  Stop 3/21/20 at 13:59;  Status DC


Sodium Chloride (Normal Saline Flush) 10 ml 1X PRN  PRN IV AP catheter pack;  

Start 3/21/20 at 08:00;  Stop 3/22/20 at 07:59;  Status DC


Sodium Chloride (Normal Saline Flush) 10 ml 1X PRN  PRN IV  catheter pack;  

Start 3/21/20 at 08:00;  Stop 3/22/20 at 07:59;  Status DC


Sodium Chloride 1,000 ml @  400 mls/hr Q2H30M PRN IV PATENCY;  Start 3/21/20 at 

07:50;  Stop 3/21/20 at 19:49;  Status DC


Info (PHARMACY MONITORING -- do not chart) 1 each PRN DAILY  PRN MC SEE 

COMMENTS;  Start 3/21/20 at 08:00;  Status UNV


Info (PHARMACY MONITORING -- do not chart) 1 each PRN DAILY  PRN MC SEE 

COMMENTS;  Start 3/21/20 at 08:00;  Stop 3/23/20 at 08:25;  Status DC


Sodium Chloride 90 meq/Potassium Chloride 15 meq/ Potassium Phosphate 10 mmol/ 

Magnesium Sulfate 10 meq/Calcium Gluconate 20 meq/ Multivitamins 10 ml/Chromium/

Copper/Manganese/ Seleni/Zn 0.5 ml/ Total Parenteral Nutrition/Amino 

Acids/Dextrose/ Fat Emulsion Intravenous 1,512 ml @  63 mls/hr TPN  CONT IV  

Last administered on 3/21/20at 20:57;  Start 3/21/20 at 22:00;  Stop 3/22/20 at 

21:59;  Status DC


Sodium Chloride 90 meq/Potassium Chloride 15 meq/ Potassium Phosphate 15 mmol/ 

Magnesium Sulfate 10 meq/Calcium Gluconate 20 meq/ Multivitamins 10 ml/Chromium/

Copper/Manganese/ Seleni/Zn 0.5 ml/ Total Parenteral Nutrition/Amino 

Acids/Dextrose/ Fat Emulsion Intravenous 1,512 ml @  63 mls/hr TPN  CONT IV ;  

Start 3/22/20 at 22:00;  Stop 3/22/20 at 14:16;  Status DC


Sodium Chloride 90 meq/Potassium Chloride 15 meq/ Potassium Phosphate 15 mmol/ 

Magnesium Sulfate 10 meq/Calcium Gluconate 20 meq/ Multivitamins 10 ml/Chromium/

Copper/Manganese/ Seleni/Zn 0.5 ml/ Total Parenteral Nutrition/Amino 

Acids/Dextrose/ Fat Emulsion Intravenous 1,200 ml @  50 mls/hr TPN  CONT IV ;  

Start 3/22/20 at 22:00;  Stop 3/22/20 at 14:17;  Status DC


Sodium Chloride 90 meq/Potassium Chloride 15 meq/ Potassium Phosphate 10 mmol/ 

Magnesium Sulfate 10 meq/Calcium Gluconate 20 meq/ Multivitamins 10 ml/Chromium/

Copper/Manganese/ Seleni/Zn 0.5 ml/ Total Parenteral Nutrition/Amino 

Acids/Dextrose/ Fat Emulsion Intravenous 1,200 ml @  50 mls/hr TPN  CONT IV  

Last administered on 3/22/20at 23:29;  Start 3/22/20 at 22:00;  Stop 3/23/20 at 

21:59;  Status DC


Sodium Chloride 1,000 ml @  1,000 mls/hr Q1H PRN IV hypotension;  Start 3/23/20 

at 07:28;  Stop 3/23/20 at 13:27;  Status DC


Albumin Human 200 ml @  200 mls/hr 1X  ONCE IV  Last administered on 3/23/20at 

08:51;  Start 3/23/20 at 07:30;  Stop 3/23/20 at 08:29;  Status DC


Diphenhydramine HCl (Benadryl) 25 mg 1X PRN  PRN IV ITCHING;  Start 3/23/20 at 

07:30;  Stop 3/24/20 at 07:29;  Status DC


Diphenhydramine HCl (Benadryl) 25 mg 1X PRN  PRN IV ITCHING;  Start 3/23/20 at 

07:30;  Stop 3/24/20 at 07:29;  Status DC


Sodium Chloride 1,000 ml @  400 mls/hr Q2H30M PRN IV PATENCY;  Start 3/23/20 at 

07:28;  Stop 3/23/20 at 19:27;  Status DC


Info (PHARMACY MONITORING -- do not chart) 1 each PRN DAILY  PRN MC SEE 

COMMENTS;  Start 3/23/20 at 07:30;  Stop 4/3/20 at 13:01;  Status DC


Metronidazole 100 ml @  100 mls/hr Q6HRS IV  Last administered on 4/8/20at 

06:26;  Start 3/23/20 at 08:30;  Stop 4/8/20 at 09:58;  Status DC


Micafungin Sodium 100 mg/Dextrose 100 ml @  100 mls/hr Q24H IV  Last 

administered on 4/30/20at 08:18;  Start 3/23/20 at 09:00;  Stop 4/30/20 at 20:5

8;  Status DC


Propofol 0 ml @ As Directed STK-MED ONCE IV ;  Start 3/23/20 at 07:53;  Stop 

3/23/20 at 07:53;  Status DC


Etomidate (Amidate) 20 mg STK-MED ONCE IV ;  Start 3/23/20 at 07:53;  Stop 

3/23/20 at 07:54;  Status DC


Midazolam HCl (Versed) 5 mg STK-MED ONCE .ROUTE ;  Start 3/23/20 at 07:57;  Stop

3/23/20 at 07:57;  Status DC


Fentanyl Citrate 30 ml @ 0 mls/hr CONT  PRN IV SEE PROTOCOL Last administered on

4/17/20at 06:12;  Start 3/23/20 at 08:15;  Stop 4/17/20 at 09:19;  Status DC


Artificial Tears (Artificial Tears) 1 drop PRN Q1HR  PRN OU DRY EYE, 1st choice;

 Start 3/23/20 at 08:15;  Stop 4/29/20 at 05:31;  Status DC


Midazolam HCl 50 mg/Sodium Chloride 50 ml @ 0 mls/hr CONT  PRN IV SEE PROTOCOL 

Last administered on 3/26/20at 22:39;  Start 3/23/20 at 08:15;  Stop 3/28/20 at 

15:59;  Status DC


Etomidate (Amidate) 8 mg 1X  ONCE IV  Last administered on 3/23/20at 08:33;  

Start 3/23/20 at 08:30;  Stop 3/23/20 at 08:31;  Status DC


Succinylcholine Chloride (Anectine) 120 mg 1X  ONCE IV  Last administered on 3

/23/20at 08:34;  Start 3/23/20 at 08:30;  Stop 3/23/20 at 08:31;  Status DC


Midazolam HCl (Versed) 5 mg 1X  ONCE IV ;  Start 3/23/20 at 08:30;  Stop 3/23/20

at 08:31;  Status DC


Potassium Chloride 15 meq/ Bicarbonate Dialysis Soln w/ out KCl 5,007.5 ml  @ 

1,000 mls/ hr Q5H1M IV  Last administered on 3/24/20at 11:11;  Start 3/23/20 at 

12:00;  Stop 3/24/20 at 11:15;  Status DC


Potassium Chloride 15 meq/ Bicarbonate Dialysis Soln w/ out KCl 5,007.5 ml  @ 

1,000 mls/ hr Q5H1M IV  Last administered on 3/24/20at 11:12;  Start 3/23/20 at 

12:00;  Stop 3/24/20 at 11:17;  Status DC


Potassium Chloride 15 meq/ Bicarbonate Dialysis Soln w/ out KCl 5,007.5 ml  @ 

1,000 mls/ hr Q5H1M IV  Last administered on 3/24/20at 11:11;  Start 3/23/20 at 

12:00;  Stop 3/24/20 at 11:19;  Status DC


Sodium Chloride 90 meq/Potassium Chloride 15 meq/ Potassium Phosphate 10 mmol/ 

Magnesium Sulfate 10 meq/Calcium Gluconate 20 meq/ Multivitamins 10 ml/Chromium/

Copper/Manganese/ Seleni/Zn 0.5 ml/ Total Parenteral Nutrition/Amino 

Acids/Dextrose/ Fat Emulsion Intravenous 1,400 ml @  58.333 mls/ hr TPN  CONT IV

 Last administered on 3/23/20at 21:42;  Start 3/23/20 at 22:00;  Stop 3/24/20 at

21:59;  Status DC


Heparin Sodium (Porcine) (Heparin Sodium) 5,000 unit Q8HRS SQ  Last administered

on 3/28/20at 05:55;  Start 3/23/20 at 15:00;  Stop 3/28/20 at 13:28;  Status DC


Meropenem 500 mg/ Sodium Chloride 50 ml @  100 mls/hr Q6HRS IV  Last 

administered on 3/25/20at 06:00;  Start 3/24/20 at 09:00;  Stop 3/25/20 at 

07:29;  Status DC


Potassium Phosphate 20 mmol/ Sodium Chloride 106.6667 ml @  51.667 m... 1X  ONCE

IV  Last administered on 3/24/20at 11:22;  Start 3/24/20 at 10:15;  Stop 3/24/20

at 12:18;  Status DC


Acetaminophen (Tylenol Supp) 650 mg PRN Q6HRS  PRN RI MILD PAIN / TEMP > 100.3'F

Last administered on 8/3/20at 04:05;  Start 3/24/20 at 10:30


Potassium Chloride/Water 100 ml @  100 mls/hr Q1H IV  Last administered on 

3/24/20at 12:12;  Start 3/24/20 at 11:00;  Stop 3/24/20 at 12:59;  Status DC


Potassium Chloride 20 meq/ Bicarbonate Dialysis Soln w/ out KCl 5,010 ml @  

1,000 mls/hr Q5H1M IV  Last administered on 3/25/20at 08:48;  Start 3/24/20 at 

12:00;  Stop 3/25/20 at 13:03;  Status DC


Potassium Chloride 20 meq/ Bicarbonate Dialysis Soln w/ out KCl 5,010 ml @  

1,000 mls/hr Q5H1M IV  Last administered on 3/29/20at 14:52;  Start 3/24/20 at 

11:30;  Stop 3/29/20 at 19:59;  Status DC


Potassium Chloride 20 meq/ Bicarbonate Dialysis Soln w/ out KCl 5,010 ml @  

1,000 mls/hr Q5H1M IV  Last administered on 3/29/20at 14:53;  Start 3/24/20 at 

11:30;  Stop 3/29/20 at 19:59;  Status DC


Sodium Chloride 90 meq/Potassium Chloride 15 meq/ Potassium Phosphate 15 mmol/ 

Magnesium Sulfate 10 meq/Calcium Gluconate 15 meq/ Multivitamins 10 ml/Chromium/

Copper/Manganese/ Seleni/Zn 0.5 ml/ Total Parenteral Nutrition/Amino Acids/Dex

trose/ Fat Emulsion Intravenous 1,400 ml @  58.333 mls/ hr TPN  CONT IV  Last 

administered on 3/24/20at 22:17;  Start 3/24/20 at 22:00;  Stop 3/25/20 at 

21:59;  Status DC


Cefepime HCl (Maxipime) 2 gm Q12HR IVP  Last administered on 4/7/20at 20:56;  

Start 3/25/20 at 09:00;  Stop 4/8/20 at 09:58;  Status DC


Daptomycin 500 mg/ Sodium Chloride 50 ml @  100 mls/hr Q48H IV  Last administ

ered on 4/10/20at 09:57;  Start 3/25/20 at 08:30;  Stop 4/10/20 at 10:07;  

Status DC


Lidocaine HCl (Buffered Lidocaine 1%) 3 ml 1X  ONCE INJ  Last administered on 

3/25/20at 10:27;  Start 3/25/20 at 10:30;  Stop 3/25/20 at 10:31;  Status DC


Potassium Phosphate 20 mmol/ Sodium Chloride 106.6667 ml @  51.667 m... 1X  ONCE

IV  Last administered on 3/25/20at 12:51;  Start 3/25/20 at 13:00;  Stop 3/25/20

at 15:03;  Status DC


Sodium Chloride 90 meq/Potassium Chloride 15 meq/ Potassium Phosphate 18 mmol/ 

Magnesium Sulfate 8 meq/Calcium Gluconate 15 meq/ Multivitamins 10 ml/Chromium/ 

Copper/Manganese/ Seleni/Zn 0.5 ml/ Total Parenteral Nutrition/Amino 

Acids/Dextrose/ Fat Emulsion Intravenous 1,400 ml @  58.333 mls/ hr TPN  CONT IV

 Last administered on 3/25/20at 22:16;  Start 3/25/20 at 22:00;  Stop 3/26/20 at

21:59;  Status DC


Potassium Chloride 20 meq/ Bicarbonate Dialysis Soln w/ out KCl 5,010 ml @  

1,000 mls/hr Q5H1M IV  Last administered on 3/29/20at 14:54;  Start 3/25/20 at 

16:00;  Stop 3/29/20 at 19:59;  Status DC


Multi-Ingred Cream/Lotion/Oil/ Oint (Artificial Tears Eye Ointment) 1 thomas PRN 

Q1HR  PRN OU DRY EYE, 2nd choice Last administered on 4/13/20at 08:19;  Start 

3/25/20 at 17:30;  Stop 6/3/20 at 14:39;  Status DC


Sodium Chloride 90 meq/Potassium Chloride 15 meq/ Potassium Phosphate 18 mmol/ 

Magnesium Sulfate 8 meq/Calcium Gluconate 15 meq/ Multivitamins 10 ml/Chromium/ 

Copper/Manganese/ Seleni/Zn 0.5 ml/ Total Parenteral Nutrition/Amino 

Acids/Dextrose/ Fat Emulsion Intravenous 1,400 ml @  58.333 mls/ hr TPN  CONT IV

 Last administered on 3/26/20at 22:00;  Start 3/26/20 at 22:00;  Stop 3/27/20 at

21:59;  Status DC


Albumin Human 500 ml @  125 mls/hr 1X  ONCE IV ;  Start 3/26/20 at 14:15;  Stop 

3/26/20 at 18:14;  Status DC


Sodium Chloride 90 meq/Potassium Chloride 15 meq/ Potassium Phosphate 18 mmol/ 

Magnesium Sulfate 8 meq/Calcium Gluconate 15 meq/ Multivitamins 10 ml/Chromium/ 

Copper/Manganese/ Seleni/Zn 0.5 ml/ Insulin Human Regular 10 unit/ Total 

Parenteral Nutrition/Amino Acids/Dextrose/ Fat Emulsion Intravenous 1,400 ml @  

58.333 mls/ hr TPN  CONT IV  Last administered on 3/27/20at 21:43;  Start 

3/27/20 at 22:00;  Stop 3/28/20 at 21:59;  Status DC


Lidocaine HCl (Buffered Lidocaine 1%) 3 ml STK-MED ONCE .ROUTE ;  Start 3/25/20 

at 10:00;  Stop 3/27/20 at 13:57;  Status DC


Midazolam HCl 100 mg/Sodium Chloride 100 ml @ 7 mls/hr CONT  PRN IV SEE PROTOCOL

Last administered on 4/8/20at 15:35;  Start 3/28/20 at 16:00;  Stop 6/3/20 at 

14:38;  Status DC


Sodium Chloride 90 meq/Potassium Chloride 15 meq/ Potassium Phosphate 18 mmol/ 

Magnesium Sulfate 8 meq/Calcium Gluconate 15 meq/ Multivitamins 10 ml/Chromium/ 

Copper/Manganese/ Seleni/Zn 0.5 ml/ Insulin Human Regular 15 unit/ Total 

Parenteral Nutrition/Amino Acids/Dextrose/ Fat Emulsion Intravenous 1,400 ml @  

58.333 mls/ hr TPN  CONT IV  Last administered on 3/28/20at 20:34;  Start 

3/28/20 at 22:00;  Stop 3/29/20 at 21:59;  Status DC


Info ([facility] Electrolyte Protocol) 1 ea CONT PRN  PRN MC PER PROTOCOL;  Start 

3/29/20 at 13:15


Sodium Chloride 90 meq/Potassium Chloride 15 meq/ Potassium Phosphate 18 mmol/ 

Magnesium Sulfate 8 meq/Calcium Gluconate 15 meq/ Multivitamins 10 ml/Chromium/ 

Copper/Manganese/ Seleni/Zn 0.5 ml/ Insulin Human Regular 15 unit/ Total 

Parenteral Nutrition/Amino Acids/Dextrose/ Fat Emulsion Intravenous 1,400 ml @  

58.333 mls/ hr TPN  CONT IV  Last administered on 3/29/20at 22:05;  Start 

3/29/20 at 22:00;  Stop 3/30/20 at 21:59;  Status DC


Potassium Chloride 15 meq/ Bicarbonate Dialysis Soln w/ out KCl 5,007.5 ml  @ 

1,000 mls/ hr Q5H1M IV  Last administered on 4/1/20at 18:14;  Start 3/29/20 at 

20:00;  Stop 4/2/20 at 13:08;  Status DC


Potassium Chloride 15 meq/ Bicarbonate Dialysis Soln w/ out KCl 5,007.5 ml  @ 

1,000 mls/ hr Q5H1M IV  Last administered on 4/1/20at 18:14;  Start 3/29/20 at 

20:00;  Stop 4/2/20 at 13:08;  Status DC


Potassium Chloride 15 meq/ Bicarbonate Dialysis Soln w/ out KCl 5,007.5 ml  @ 

1,000 mls/ hr Q5H1M IV  Last administered on 4/1/20at 18:14;  Start 3/29/20 at 

20:00;  Stop 4/2/20 at 13:08;  Status DC


Iohexol (Omnipaque 240 Mg/ml) 30 ml 1X  ONCE PO  Last administered on 3/30/20at 

11:30;  Start 3/30/20 at 11:30;  Stop 3/30/20 at 11:33;  Status DC


Info (CONTRAST GIVEN -- Rx MONITORING) 1 each PRN DAILY  PRN MC SEE COMMENTS;  

Start 3/30/20 at 11:45;  Stop 4/1/20 at 11:44;  Status DC


Sodium Chloride 90 meq/Potassium Chloride 15 meq/ Potassium Phosphate 18 mmol/ 

Magnesium Sulfate 8 meq/Calcium Gluconate 15 meq/ Multivitamins 10 ml/Chromium/ 

Copper/Manganese/ Seleni/Zn 0.5 ml/ Insulin Human Regular 15 unit/ Total 

Parenteral Nutrition/Amino Acids/Dextrose/ Fat Emulsion Intravenous 1,400 ml @  

58.333 mls/ hr TPN  CONT IV  Last administered on 3/30/20at 21:47;  Start 

3/30/20 at 22:00;  Stop 3/31/20 at 21:59;  Status DC


Sodium Chloride 90 meq/Potassium Chloride 15 meq/ Potassium Phosphate 18 mmol/ 

Magnesium Sulfate 8 meq/Calcium Gluconate 15 meq/ Multivitamins 10 ml/Chromium/ 

Copper/Manganese/ Seleni/Zn 0.5 ml/ Insulin Human Regular 20 unit/ Total 

Parenteral Nutrition/Amino Acids/Dextrose/ Fat Emulsion Intravenous 1,400 ml @  

58.333 mls/ hr TPN  CONT IV  Last administered on 3/31/20at 21:36;  Start 

3/31/20 at 22:00;  Stop 4/1/20 at 21:59;  Status DC


Alteplase, Recombinant (Cathflo For Central Catheter Clearance) 1 mg 1X  ONCE 

INT CAT  Last administered on 3/31/20at 20:03;  Start 3/31/20 at 19:30;  Stop 3

/31/20 at 19:46;  Status DC


Alteplase, Recombinant (Cathflo For Central Catheter Clearance) 1 mg 1X  ONCE 

INT CAT  Last administered on 3/31/20at 22:05;  Start 3/31/20 at 22:00;  Stop 

3/31/20 at 22:01;  Status DC


Sodium Chloride 90 meq/Potassium Chloride 15 meq/ Potassium Phosphate 18 mmol/ 

Magnesium Sulfate 8 meq/Calcium Gluconate 15 meq/ Multivitamins 10 ml/Chromium/ 

Copper/Manganese/ Seleni/Zn 0.5 ml/ Insulin Human Regular 20 unit/ Total 

Parenteral Nutrition/Amino Acids/Dextrose/ Fat Emulsion Intravenous 1,400 ml @  

58.333 mls/ hr TPN  CONT IV  Last administered on 4/1/20at 21:30;  Start 4/1/20 

at 22:00;  Stop 4/2/20 at 21:59;  Status DC


Dexmedetomidine HCl 400 mcg/ Sodium Chloride 100 ml @ 0 mls/hr CONT  PRN IV 

ANXIETY / AGITATION Last administered on 5/30/20at 12:57;  Start 4/2/20 at 

08:15;  Stop 5/30/20 at 18:31;  Status DC


Sodium Chloride 500 ml @  500 mls/hr 1X PRN  PRN IV ELEVATED BP, SEE COMMENTS;  

Start 4/2/20 at 08:15


Atropine Sulfate (ATROPINE 0.5mg SYRINGE) 0.5 mg PRN Q5MIN  PRN IV SEE COMMENTS;

 Start 4/2/20 at 08:15


Furosemide (Lasix) 20 mg 1X  ONCE IVP  Last administered on 4/2/20at 08:19;  

Start 4/2/20 at 08:15;  Stop 4/2/20 at 08:16;  Status DC


Lidocaine HCl (Buffered Lidocaine 1%) 3 ml STK-MED ONCE .ROUTE ;  Start 4/2/20 

at 08:39;  Stop 4/2/20 at 08:39;  Status DC


Lidocaine HCl (Buffered Lidocaine 1%) 6 ml 1X  ONCE INJ  Last administered on 

4/2/20at 09:05;  Start 4/2/20 at 09:00;  Stop 4/2/20 at 09:06;  Status DC


Sodium Chloride 90 meq/Potassium Chloride 15 meq/ Potassium Phosphate 18 mmol/ 

Magnesium Sulfate 8 meq/Calcium Gluconate 15 meq/ Multivitamins 10 ml/Chromium/ 

Copper/Manganese/ Seleni/Zn 0.5 ml/ Insulin Human Regular 20 unit/ Total 

Parenteral Nutrition/Amino Acids/Dextrose/ Fat Emulsion Intravenous 1,400 ml @  

58.333 mls/ hr TPN  CONT IV  Last administered on 4/2/20at 22:45;  Start 4/2/20 

at 22:00;  Stop 4/3/20 at 21:59;  Status DC


Sodium Chloride 1,000 ml @  1,000 mls/hr Q1H PRN IV hypotension;  Start 4/3/20 

at 07:30;  Stop 4/3/20 at 13:29;  Status DC


Albumin Human 200 ml @  200 mls/hr 1X PRN  PRN IV Hypotension Last administered 

on 4/3/20at 09:36;  Start 4/3/20 at 07:30;  Stop 4/3/20 at 13:29;  Status DC


Sodium Chloride (Normal Saline Flush) 10 ml 1X PRN  PRN IV AP catheter pack;  

Start 4/3/20 at 07:30;  Stop 4/3/20 at 21:29;  Status DC


Sodium Chloride (Normal Saline Flush) 10 ml 1X PRN  PRN IV  catheter pack;  

Start 4/3/20 at 07:30;  Stop 4/4/20 at 07:29;  Status DC


Sodium Chloride 1,000 ml @  400 mls/hr Q2H30M PRN IV PATENCY;  Start 4/3/20 at 

07:30;  Stop 4/3/20 at 19:29;  Status DC


Info (PHARMACY MONITORING -- do not chart) 1 each PRN DAILY  PRN MC SEE 

COMMENTS;  Start 4/3/20 at 07:30;  Stop 4/3/20 at 13:02;  Status DC


Info (PHARMACY MONITORING -- do not chart) 1 each PRN DAILY  PRN MC SEE 

COMMENTS;  Start 4/3/20 at 07:30;  Stop 4/5/20 at 12:45;  Status DC


Sodium Chloride 90 meq/Potassium Chloride 15 meq/ Potassium Phosphate 10 mmol/ 

Magnesium Sulfate 8 meq/Calcium Gluconate 15 meq/ Multivitamins 10 ml/Chromium/ 

Copper/Manganese/ Seleni/Zn 0.5 ml/ Insulin Human Regular 25 unit/ Total 

Parenteral Nutrition/Amino Acids/Dextrose/ Fat Emulsion Intravenous 1,400 ml @  

58.333 mls/ hr TPN  CONT IV  Last administered on 4/3/20at 22:19;  Start 4/3/20 

at 22:00;  Stop 4/4/20 at 21:59;  Status DC


Heparin Sodium (Porcine) (Heparin Sodium) 5,000 unit Q12HR SQ  Last administered

on 4/26/20at 08:59;  Start 4/3/20 at 21:00;  Stop 4/26/20 at 10:05;  Status DC


Ondansetron HCl (Zofran) 4 mg PRN Q6HRS  PRN IV NAUSEA/VOMITING;  Start 4/6/20 

at 07:00;  Stop 4/7/20 at 06:59;  Status DC


Fentanyl Citrate (Fentanyl 2ml Vial) 25 mcg PRN Q5MIN  PRN IV MILD PAIN 1-3;  

Start 4/6/20 at 07:00;  Stop 4/7/20 at 06:59;  Status DC


Fentanyl Citrate (Fentanyl 2ml Vial) 50 mcg PRN Q5MIN  PRN IV MODERATE TO SEVERE

PAIN;  Start 4/6/20 at 07:00;  Stop 4/7/20 at 06:59;  Status DC


Ringer's Solution 1,000 ml @  30 mls/hr Q24H IV ;  Start 4/6/20 at 07:00;  Stop 

4/6/20 at 18:59;  Status DC


Lidocaine HCl (Xylocaine-Mpf 1% 2ml Vial) 2 ml PRN 1X  PRN ID PRIOR TO IV START;

 Start 4/6/20 at 07:00;  Stop 4/7/20 at 06:59;  Status DC


Prochlorperazine Edisylate (Compazine) 5 mg PACU PRN  PRN IV NAUSEA, MRX1;  

Start 4/6/20 at 07:00;  Stop 4/7/20 at 06:59;  Status DC


Sodium Chloride 1,000 ml @  1,000 mls/hr Q1H PRN IV hypotension;  Start 4/4/20 

at 09:10;  Stop 4/4/20 at 15:09;  Status DC


Albumin Human 200 ml @  200 mls/hr 1X PRN  PRN IV Hypotension Last administered 

on 4/4/20at 10:10;  Start 4/4/20 at 09:15;  Stop 4/4/20 at 15:14;  Status DC


Sodium Chloride 1,000 ml @  400 mls/hr Q2H30M PRN IV PATENCY;  Start 4/4/20 at 

09:10;  Stop 4/4/20 at 21:09;  Status DC


Info (PHARMACY MONITORING -- do not chart) 1 each PRN DAILY  PRN MC SEE 

COMMENTS;  Start 4/4/20 at 09:15;  Stop 4/5/20 at 12:45;  Status DC


Info (PHARMACY MONITORING -- do not chart) 1 each PRN DAILY  PRN MC SEE 

COMMENTS;  Start 4/4/20 at 09:15;  Stop 4/5/20 at 12:45;  Status DC


Sodium Chloride 90 meq/Potassium Chloride 15 meq/ Potassium Phosphate 10 mmol/ 

Magnesium Sulfate 8 meq/Calcium Gluconate 15 meq/ Multivitamins 10 ml/Chromium/ 

Copper/Manganese/ Seleni/Zn 0.5 ml/ Insulin Human Regular 25 unit/ Total 

Parenteral Nutrition/Amino Acids/Dextrose/ Fat Emulsion Intravenous 1,400 ml @  

58.333 mls/ hr TPN  CONT IV  Last administered on 4/4/20at 22:10;  Start 4/4/20 

at 22:00;  Stop 4/5/20 at 21:59;  Status DC


Magnesium Sulfate 50 ml @ 25 mls/hr PRN DAILY  PRN IV for Mag < 1.7 on am labs 

Last administered on 6/18/20at 10:57;  Start 4/5/20 at 09:15


Sodium Chloride 90 meq/Potassium Chloride 15 meq/ Potassium Phosphate 10 mmol/ 

Magnesium Sulfate 8 meq/Calcium Gluconate 15 meq/ Multivitamins 10 ml/Chromium/ 

Copper/Manganese/ Seleni/Zn 0.5 ml/ Insulin Human Regular 25 unit/ Total 

Parenteral Nutrition/Amino Acids/Dextrose/ Fat Emulsion Intravenous 1,400 ml @  

58.333 mls/ hr TPN  CONT IV  Last administered on 4/5/20at 21:20;  Start 4/5/20 

at 22:00;  Stop 4/6/20 at 21:59;  Status DC


Sodium Chloride 1,000 ml @  1,000 mls/hr Q1H PRN IV hypotension;  Start 4/5/20 

at 12:23;  Stop 4/5/20 at 18:22;  Status DC


Albumin Human 200 ml @  200 mls/hr 1X  ONCE IV  Last administered on 4/5/20at 

13:34;  Start 4/5/20 at 12:30;  Stop 4/5/20 at 13:29;  Status DC


Diphenhydramine HCl (Benadryl) 25 mg 1X PRN  PRN IV ITCHING;  Start 4/5/20 at 

12:30;  Stop 4/6/20 at 12:29;  Status DC


Diphenhydramine HCl (Benadryl) 25 mg 1X PRN  PRN IV ITCHING;  Start 4/5/20 at 

12:30;  Stop 4/6/20 at 12:29;  Status DC


Info (PHARMACY MONITORING -- do not chart) 1 each PRN DAILY  PRN MC SEE 

COMMENTS;  Start 4/5/20 at 12:30;  Status Cancel


Bupivacaine HCl/ Epinephrine Bitart (Sensorcain-Epi 0.5%-1:[id number] Mpf) 30 ml 

STK-MED ONCE .ROUTE  Last administered on 4/6/20at 11:44;  Start 4/6/20 at 

11:00;  Stop 4/6/20 at 11:01;  Status DC


Cellulose (Surgicel Fibrillar 1x2) 1 each STK-MED ONCE .ROUTE ;  Start 4/6/20 at

11:00;  Stop 4/6/20 at 11:01;  Status DC


Sodium Chloride 90 meq/Potassium Chloride 15 meq/ Potassium Phosphate 10 mmol/ 

Magnesium Sulfate 12 meq/Calcium Gluconate 15 meq/ Multivitamins 10 ml/Chromium/

Copper/Manganese/ Seleni/Zn 0.5 ml/ Insulin Human Regular 25 unit/ Total 

Parenteral Nutrition/Amino Acids/Dextrose/ Fat Emulsion Intravenous 1,400 ml @  

58.333 mls/ hr TPN  CONT IV  Last administered on 4/6/20at 22:24;  Start 4/6/20 

at 22:00;  Stop 4/7/20 at 21:59;  Status DC


Propofol 20 ml @ As Directed STK-MED ONCE IV ;  Start 4/6/20 at 11:07;  Stop 

4/6/20 at 11:07;  Status DC


Cellulose (Surgicel Hemostat 4x8) 1 each STK-MED ONCE .ROUTE  Last administered 

on 4/6/20at 11:44;  Start 4/6/20 at 11:55;  Stop 4/6/20 at 11:56;  Status DC


Sevoflurane (Ultane) 60 ml STK-MED ONCE IH ;  Start 4/6/20 at 12:46;  Stop 

4/6/20 at 12:46;  Status DC


Sodium Chloride 1,000 ml @  1,000 mls/hr Q1H PRN IV hypotension;  Start 4/6/20 

at 13:51;  Stop 4/6/20 at 19:50;  Status DC


Albumin Human 200 ml @  200 mls/hr 1X PRN  PRN IV Hypotension Last administered 

on 4/6/20at 14:51;  Start 4/6/20 at 14:00;  Stop 4/6/20 at 19:59;  Status DC


Diphenhydramine HCl (Benadryl) 25 mg 1X PRN  PRN IV ITCHING;  Start 4/6/20 at 

14:00;  Stop 4/7/20 at 13:59;  Status DC


Diphenhydramine HCl (Benadryl) 25 mg 1X PRN  PRN IV ITCHING;  Start 4/6/20 at 

14:00;  Stop 4/7/20 at 13:59;  Status DC


Sodium Chloride 1,000 ml @  400 mls/hr Q2H30M PRN IV PATENCY;  Start 4/6/20 at 

13:51;  Stop 4/7/20 at 01:50;  Status DC


Info (PHARMACY MONITORING -- do not chart) 1 each PRN DAILY  PRN MC SEE 

COMMENTS;  Start 4/6/20 at 14:00;  Stop 4/9/20 at 08:16;  Status DC


Heparin Sodium (Porcine) (Hep Lock Adult) 500 unit STK-MED ONCE IVP ;  Start 

4/7/20 at 09:29;  Stop 4/7/20 at 09:30;  Status DC


Sodium Chloride 1,000 ml @  1,000 mls/hr Q1H PRN IV hypotension;  Start 4/7/20 

at 10:43;  Stop 4/7/20 at 16:42;  Status DC


Sodium Chloride 1,000 ml @  400 mls/hr Q2H30M PRN IV PATENCY;  Start 4/7/20 at 

10:43;  Stop 4/7/20 at 22:42;  Status DC


Info (PHARMACY MONITORING -- do not chart) 1 each PRN DAILY  PRN MC SEE 

COMMENTS;  Start 4/7/20 at 10:45;  Status UNV


Info (PHARMACY MONITORING -- do not chart) 1 each PRN DAILY  PRN MC SEE 

COMMENTS;  Start 4/7/20 at 10:45;  Status UNV


Sodium Chloride 90 meq/Potassium Chloride 15 meq/ Magnesium Sulfate 12 

meq/Calcium Gluconate 15 meq/ Multivitamins 10 ml/Chromium/ Copper/Manganese/ 

Seleni/Zn 0.5 ml/ Insulin Human Regular 25 unit/ Total Parenteral 

Nutrition/Amino Acids/Dextrose/ Fat Emulsion Intravenous 1,400 ml @  58.333 mls/

hr TPN  CONT IV  Last administered on 4/7/20at 22:13;  Start 4/7/20 at 22:00;  

Stop 4/8/20 at 21:59;  Status DC


Sodium Chloride 1,000 ml @  1,000 mls/hr Q1H PRN IV hypotension;  Start 4/8/20 

at 07:50;  Stop 4/8/20 at 13:49;  Status DC


Albumin Human 200 ml @  200 mls/hr 1X  ONCE IV ;  Start 4/8/20 at 08:00;  Stop 

4/8/20 at 08:53;  Status DC


Diphenhydramine HCl (Benadryl) 25 mg 1X PRN  PRN IV ITCHING;  Start 4/8/20 at 

08:00;  Stop 4/9/20 at 07:59;  Status DC


Diphenhydramine HCl (Benadryl) 25 mg 1X PRN  PRN IV ITCHING;  Start 4/8/20 at 

08:00;  Stop 4/9/20 at 07:59;  Status DC


Info (PHARMACY MONITORING -- do not chart) 1 each PRN DAILY  PRN MC SEE 

COMMENTS;  Start 4/8/20 at 08:00;  Stop 4/9/20 at 08:16;  Status DC


Albumin Human 50 ml @ 50 mls/hr 1X  ONCE IV ;  Start 4/8/20 at 08:53;  Stop 

4/8/20 at 08:56;  Status DC


Albumin Human 200 ml @  50 mls/hr PRN 1X  PRN IV HYPOTENSION Last administered 

on 4/14/20at 11:54;  Start 4/8/20 at 09:00;  Stop 5/21/20 at 11:14;  Status DC


Meropenem 500 mg/ Sodium Chloride 50 ml @  100 mls/hr Q12H IV  Last administered

on 4/28/20at 10:45;  Start 4/8/20 at 10:00;  Stop 4/28/20 at 12:37;  Status DC


Sodium Chloride 90 meq/Magnesium Sulfate 12 meq/ Calcium Gluconate 15 meq/ 

Multivitamins 10 ml/Chromium/ Copper/Manganese/ Seleni/Zn 0.5 ml/ Insulin Human 

Regular 25 unit/ Total Parenteral Nutrition/Amino Acids/Dextrose/ Fat Emulsion 

Intravenous 1,400 ml @  58.333 mls/ hr TPN  CONT IV  Last administered on 

4/8/20at 21:41;  Start 4/8/20 at 22:00;  Stop 4/9/20 at 21:59;  Status DC


Sodium Chloride 1,000 ml @  1,000 mls/hr Q1H PRN IV hypotension;  Start 4/9/20 

at 07:58;  Stop 4/9/20 at 13:57;  Status DC


Albumin Human 200 ml @  200 mls/hr 1X PRN  PRN IV Hypotension Last administered 

on 4/9/20at 09:30;  Start 4/9/20 at 08:00;  Stop 4/9/20 at 13:59;  Status DC


Sodium Chloride 1,000 ml @  400 mls/hr Q2H30M PRN IV PATENCY;  Start 4/9/20 at 

07:58;  Stop 4/9/20 at 19:57;  Status DC


Info (PHARMACY MONITORING -- do not chart) 1 each PRN DAILY  PRN MC SEE 

COMMENTS;  Start 4/9/20 at 08:00;  Status Cancel


Info (PHARMACY MONITORING -- do not chart) 1 each PRN DAILY  PRN MC SEE 

COMMENTS;  Start 4/9/20 at 08:15;  Status UNV


Sodium Chloride 90 meq/Potassium Phosphate 5 mmol/ Magnesium Sulfate 12 

meq/Calcium Gluconate 15 meq/ Multivitamins 10 ml/Chromium/ Copper/Manganese/ 

Seleni/Zn 0.5 ml/ Insulin Human Regular 30 unit/ Total Parenteral 

Nutrition/Amino Acids/Dextrose/ Fat Emulsion Intravenous 1,400 ml @  58.333 mls/

hr TPN  CONT IV  Last administered on 4/9/20at 22:08;  Start 4/9/20 at 22:00;  

Stop 4/10/20 at 21:59;  Status DC


Linezolid/Dextrose 300 ml @  300 mls/hr Q12HR IV  Last administered on 4/20/20at

20:40;  Start 4/10/20 at 11:00;  Stop 4/21/20 at 08:10;  Status DC


Sodium Chloride 90 meq/Potassium Phosphate 15 mmol/ Magnesium Sulfate 12 

meq/Calcium Gluconate 15 meq/ Multivitamins 10 ml/Chromium/ Copper/Manganese/ 

Seleni/Zn 0.5 ml/ Insulin Human Regular 30 unit/ Total Parenteral Nutrition/Ami

no Acids/Dextrose/ Fat Emulsion Intravenous 1,400 ml @  58.333 mls/ hr TPN  CONT

IV  Last administered on 4/10/20at 21:49;  Start 4/10/20 at 22:00;  Stop 4/11/20

at 21:59;  Status DC


Sodium Chloride 90 meq/Potassium Phosphate 15 mmol/ Magnesium Sulfate 12 

meq/Calcium Gluconate 15 meq/ Multivitamins 10 ml/Chromium/ Copper/Manganese/ 

Seleni/Zn 0.5 ml/ Insulin Human Regular 40 unit/ Total Parenteral 

Nutrition/Amino Acids/Dextrose/ Fat Emulsion Intravenous 1,400 ml @  58.333 mls/

hr TPN  CONT IV  Last administered on 4/11/20at 21:21;  Start 4/11/20 at 22:00; 

Stop 4/12/20 at 21:59;  Status DC


Sodium Chloride 1,000 ml @  1,000 mls/hr Q1H PRN IV hypotension;  Start 4/11/20 

at 13:26;  Stop 4/11/20 at 19:25;  Status DC


Albumin Human 200 ml @  200 mls/hr 1X PRN  PRN IV Hypotension Last administered 

on 4/11/20at 15:00;  Start 4/11/20 at 13:30;  Stop 4/11/20 at 19:29;  Status DC


Sodium Chloride (Normal Saline Flush) 10 ml 1X PRN  PRN IV AP catheter pack;  

Start 4/11/20 at 13:30;  Stop 4/12/20 at 13:29;  Status DC


Sodium Chloride (Normal Saline Flush) 10 ml 1X PRN  PRN IV  catheter pack;  

Start 4/11/20 at 13:30;  Stop 4/12/20 at 13:29;  Status DC


Sodium Chloride 1,000 ml @  400 mls/hr Q2H30M PRN IV PATENCY;  Start 4/11/20 at 

13:26;  Stop 4/12/20 at 01:25;  Status DC


Info (PHARMACY MONITORING -- do not chart) 1 each PRN DAILY  PRN MC SEE 

COMMENTS;  Start 4/11/20 at 13:30;  Stop 4/11/20 at 13:33;  Status DC


Info (PHARMACY MONITORING -- do not chart) 1 each PRN DAILY  PRN MC SEE 

COMMENTS;  Start 4/11/20 at 13:30;  Stop 4/11/20 at 13:34;  Status DC


Sodium Chloride 90 meq/Potassium Phosphate 19 mmol/ Magnesium Sulfate 12 

meq/Calcium Gluconate 15 meq/ Multivitamins 10 ml/Chromium/ Copper/Manganese/ 

Seleni/Zn 0.5 ml/ Insulin Human Regular 40 unit/ Total Parenteral Nutrition/Amin

o Acids/Dextrose/ Fat Emulsion Intravenous 1,400 ml @  58.333 mls/ hr TPN  CONT 

IV  Last administered on 4/12/20at 21:54;  Start 4/12/20 at 22:00;  Stop 4/13/20

at 21:59;  Status DC


Sodium Chloride 1,000 ml @  1,000 mls/hr Q1H PRN IV hypotension;  Start 4/13/20 

at 09:35;  Stop 4/13/20 at 15:34;  Status DC


Albumin Human 200 ml @  200 mls/hr 1X PRN  PRN IV Hypotension;  Start 4/13/20 at

09:45;  Stop 4/13/20 at 15:44;  Status DC


Diphenhydramine HCl (Benadryl) 25 mg 1X PRN  PRN IV ITCHING;  Start 4/13/20 at 

09:45;  Stop 4/14/20 at 09:44;  Status DC


Diphenhydramine HCl (Benadryl) 25 mg 1X PRN  PRN IV ITCHING;  Start 4/13/20 at 

09:45;  Stop 4/14/20 at 09:44;  Status DC


Sodium Chloride 1,000 ml @  400 mls/hr Q2H30M PRN IV PATENCY;  Start 4/13/20 at 

09:35;  Stop 4/13/20 at 21:34;  Status DC


Info (PHARMACY MONITORING -- do not chart) 1 each PRN DAILY  PRN MC SEE 

COMMENTS;  Start 4/13/20 at 09:45;  Status Cancel


Sodium Chloride 100 meq/Potassium Phosphate 19 mmol/ Magnesium Sulfate 12 

meq/Calcium Gluconate 15 meq/ Multivitamins 10 ml/Chromium/ Copper/Manganese/ 

Seleni/Zn 0.5 ml/ Insulin Human Regular 40 unit/ Potassium Chloride 20 meq/ 

Total Parenteral Nutrition/Amino Acids/Dextrose/ Fat Emulsion Intravenous 1,400 

ml @  58.333 mls/ hr TPN  CONT IV  Last administered on 4/13/20at 22:02;  Start 

4/13/20 at 22:00;  Stop 4/14/20 at 21:59;  Status DC


Furosemide (Lasix) 40 mg 1X  ONCE IVP  Last administered on 4/13/20at 14:39;  

Start 4/13/20 at 14:30;  Stop 4/13/20 at 14:31;  Status DC


Metronidazole 100 ml @  100 mls/hr Q8HRS IV  Last administered on 4/21/20at 

06:04;  Start 4/14/20 at 10:00;  Stop 4/21/20 at 08:10;  Status DC


Sodium Chloride 1,000 ml @  1,000 mls/hr Q1H PRN IV hypotension;  Start 4/14/20 

at 08:00;  Stop 4/14/20 at 13:59;  Status DC


Albumin Human 200 ml @  200 mls/hr 1X PRN  PRN IV Hypotension;  Start 4/14/20 at

08:00;  Stop 4/14/20 at 13:59;  Status DC


Sodium Chloride 1,000 ml @  400 mls/hr Q2H30M PRN IV PATENCY;  Start 4/14/20 at 

08:00;  Stop 4/14/20 at 19:59;  Status DC


Info (PHARMACY MONITORING -- do not chart) 1 each PRN DAILY  PRN MC SEE 

COMMENTS;  Start 4/14/20 at 11:30;  Status UNV


Info (PHARMACY MONITORING -- do not chart) 1 each PRN DAILY  PRN MC SEE 

COMMENTS;  Start 4/14/20 at 11:30;  Stop 4/16/20 at 12:13;  Status DC


Sodium Chloride 100 meq/Potassium Phosphate 19 mmol/ Magnesium Sulfate 12 

meq/Calcium Gluconate 15 meq/ Multivitamins 10 ml/Chromium/ Copper/Manganese/ 

Seleni/Zn 0.5 ml/ Insulin Human Regular 40 unit/ Potassium Chloride 20 meq/ 

Total Parenteral Nutrition/Amino Acids/Dextrose/ Fat Emulsion Intravenous 1,400 

ml @  58.333 mls/ hr TPN  CONT IV  Last administered on 4/14/20at 21:52;  Start 

4/14/20 at 22:00;  Stop 4/15/20 at 21:59;  Status DC


Sodium Chloride (Normal Saline Flush) 10 ml QSHIFT  PRN IV AFTER MEDS AND BLOOD 

DRAWS;  Start 4/14/20 at 15:00;  Stop 5/12/20 at 11:27;  Status DC


Sodium Chloride (Normal Saline Flush) 10 ml PRN Q5MIN  PRN IV AFTER MEDS AND 

BLOOD DRAWS;  Start 4/14/20 at 15:00;  Stop 8/1/20 at 10:12;  Status DC


Sodium Chloride (Normal Saline Flush) 20 ml PRN Q5MIN  PRN IV AFTER MEDS AND 

BLOOD DRAWS;  Start 4/14/20 at 15:00


Sodium Chloride 100 meq/Potassium Phosphate 19 mmol/ Magnesium Sulfate 12 

meq/Calcium Gluconate 15 meq/ Multivitamins 10 ml/Chromium/ Copper/Manganese/ 

Seleni/Zn 0.5 ml/ Insulin Human Regular 40 unit/ Potassium Chloride 20 meq/ 

Total Parenteral Nutrition/Amino Acids/Dextrose/ Fat Emulsion Intravenous 1,400 

ml @  58.333 mls/ hr TPN  CONT IV  Last administered on 4/15/20at 21:20;  Start 

4/15/20 at 22:00;  Stop 4/16/20 at 21:59;  Status DC


Lidocaine HCl (Buffered Lidocaine 1%) 3 ml STK-MED ONCE .ROUTE ;  Start 4/15/20 

at 13:16;  Stop 4/15/20 at 13:16;  Status DC


Lidocaine HCl (Buffered Lidocaine 1%) 6 ml 1X  ONCE INJ  Last administered on 

4/15/20at 13:45;  Start 4/15/20 at 13:30;  Stop 4/15/20 at 13:31;  Status DC


Albumin Human 100 ml @  100 mls/hr 1X  ONCE IV  Last administered on 4/15/20at 

15:41;  Start 4/15/20 at 15:00;  Stop 4/15/20 at 15:59;  Status DC


Albumin Human 50 ml @ 50 mls/hr 1X  ONCE IV  Last administered on 4/15/20at 

15:00;  Start 4/15/20 at 15:00;  Stop 4/15/20 at 15:59;  Status DC


Info (PHARMACY MONITORING -- do not chart) 1 each PRN DAILY  PRN MC SEE 

COMMENTS;  Start 4/16/20 at 11:30;  Status Cancel


Info (PHARMACY MONITORING -- do not chart) 1 each PRN DAILY  PRN MC SEE 

COMMENTS;  Start 4/16/20 at 11:30;  Status UNV


Sodium Chloride 100 meq/Potassium Phosphate 10 mmol/ Magnesium Sulfate 12 

meq/Calcium Gluconate 15 meq/ Multivitamins 10 ml/Chromium/ Copper/Manganese/ 

Seleni/Zn 0.5 ml/ Insulin Human Regular 35 unit/ Potassium Chloride 20 meq/ 

Total Parenteral Nutrition/Amino Acids/Dextrose/ Fat Emulsion Intravenous 1,400 

ml @  58.333 mls/ hr TPN  CONT IV  Last administered on 4/16/20at 22:10;  Start 

4/16/20 at 22:00;  Stop 4/17/20 at 21:59;  Status DC


Sodium Chloride 100 meq/Potassium Phosphate 5 mmol/ Magnesium Sulfate 12 

meq/Calcium Gluconate 15 meq/ Multivitamins 10 ml/Chromium/ Copper/Manganese/ 

Seleni/Zn 0.5 ml/ Insulin Human Regular 35 unit/ Potassium Chloride 20 meq/ 

Total Parenteral Nutrition/Amino Acids/Dextrose/ Fat Emulsion Intravenous 1,400 

ml @  58.333 mls/ hr TPN  CONT IV  Last administered on 4/17/20at 22:59;  Start 

4/17/20 at 22:00;  Stop 4/18/20 at 21:59;  Status DC


Sodium Chloride 1,000 ml @  1,000 mls/hr Q1H PRN IV hypotension;  Start 4/18/20 

at 08:27;  Stop 4/18/20 at 14:26;  Status DC


Albumin Human 200 ml @  200 mls/hr 1X PRN  PRN IV Hypotension Last administered 

on 4/18/20at 09:18;  Start 4/18/20 at 08:30;  Stop 4/18/20 at 14:29;  Status DC


Sodium Chloride 1,000 ml @  400 mls/hr Q2H30M PRN IV PATENCY;  Start 4/18/20 at 

08:27;  Stop 4/18/20 at 20:26;  Status DC


Info (PHARMACY MONITORING -- do not chart) 1 each PRN DAILY  PRN MC SEE 

COMMENTS;  Start 4/18/20 at 08:30;  Status Cancel


Info (PHARMACY MONITORING -- do not chart) 1 each PRN DAILY  PRN MC SEE 

COMMENTS;  Start 4/18/20 at 08:30;  Stop 4/26/20 at 13:10;  Status DC


Sodium Chloride 100 meq/Potassium Chloride 40 meq/ Magnesium Sulfate 15 

meq/Calcium Gluconate 15 meq/ Multivitamins 10 ml/Chromium/ Copper/Manganese/ 

Seleni/Zn 0.5 ml/ Insulin Human Regular 35 unit/ Total Parenteral Nutrit

ion/Amino Acids/Dextrose/ Fat Emulsion Intravenous 1,400 ml @  58.333 mls/ hr 

TPN  CONT IV  Last administered on 4/18/20at 22:00;  Start 4/18/20 at 22:00;  

Stop 4/19/20 at 21:59;  Status DC


Potassium Chloride/Water 100 ml @  100 mls/hr 1X  ONCE IV  Last administered on 

4/18/20at 17:28;  Start 4/18/20 at 14:45;  Stop 4/18/20 at 15:44;  Status DC


Sodium Chloride 100 meq/Potassium Chloride 40 meq/ Magnesium Sulfate 15 

meq/Calcium Gluconate 15 meq/ Multivitamins 10 ml/Chromium/ Copper/Manganese/ 

Seleni/Zn 0.5 ml/ Insulin Human Regular 35 unit/ Total Parenteral 

Nutrition/Amino Acids/Dextrose/ Fat Emulsion Intravenous 1,400 ml @  58.333 mls/

hr TPN  CONT IV  Last administered on 4/19/20at 22:46;  Start 4/19/20 at 22:00; 

Stop 4/20/20 at 21:59;  Status DC


Sodium Chloride 100 meq/Potassium Chloride 40 meq/ Magnesium Sulfate 20 

meq/Calcium Gluconate 15 meq/ Multivitamins 10 ml/Chromium/ Copper/Manganese/ 

Seleni/Zn 0.5 ml/ Insulin Human Regular 35 unit/ Total Parenteral 

Nutrition/Amino Acids/Dextrose/ Fat Emulsion Intravenous 1,400 ml @  58.333 mls/

hr TPN  CONT IV  Last administered on 4/20/20at 22:31;  Start 4/20/20 at 22:00; 

Stop 4/21/20 at 21:59;  Status DC


Fentanyl Citrate (Fentanyl 2ml Vial) 50 mcg PRN Q2HR  PRN IVP PAIN Last 

administered on 4/27/20at 13:32;  Start 4/20/20 at 21:00;  Stop 4/28/20 at 

12:53;  Status DC


Fentanyl Citrate (Fentanyl 2ml Vial) 25 mcg PRN Q2HR  PRN IVP PAIN;  Start 

4/20/20 at 21:00;  Stop 4/28/20 at 12:54;  Status DC


Enoxaparin Sodium (Lovenox 100mg Syringe) 100 mg Q12HR SQ ;  Start 4/21/20 at 

21:00;  Status UNV


Amino Acids/ Glycerin/ Electrolytes 1,000 ml @  75 mls/hr [id number] IV ;  Start 

4/20/20 at 21:15;  Status UNV


Sodium Chloride 1,000 ml @  1,000 mls/hr Q1H PRN IV hypotension;  Start 4/21/20 

at 07:56;  Stop 4/21/20 at 13:55;  Status DC


Albumin Human 200 ml @  200 mls/hr 1X PRN  PRN IV Hypotension Last administered 

on 4/21/20at 08:40;  Start 4/21/20 at 08:00;  Stop 4/21/20 at 13:59;  Status DC


Sodium Chloride 1,000 ml @  400 mls/hr Q2H30M PRN IV PATENCY;  Start 4/21/20 at 

07:56;  Stop 4/21/20 at 19:55;  Status DC


Info (PHARMACY MONITORING -- do not chart) 1 each PRN DAILY  PRN MC SEE 

COMMENTS;  Start 4/21/20 at 08:00;  Status UNV


Info (PHARMACY MONITORING -- do not chart) 1 each PRN DAILY  PRN MC SEE 

COMMENTS;  Start 4/21/20 at 08:00;  Status UNV


Daptomycin 430 mg/ Sodium Chloride 50 ml @  100 mls/hr Q24H IV  Last 

administered on 4/21/20at 12:35;  Start 4/21/20 at 09:00;  Stop 4/21/20 at 

12:49;  Status DC


Sodium Chloride 100 meq/Potassium Chloride 40 meq/ Magnesium Sulfate 20 

meq/Calcium Gluconate 15 meq/ Multivitamins 10 ml/Chromium/ Copper/Manganese/ 

Seleni/Zn 0.5 ml/ Insulin Human Regular 35 unit/ Total Parenteral 

Nutrition/Amino Acids/Dextrose/ Fat Emulsion Intravenous 1,400 ml @  58.333 mls/

hr TPN  CONT IV  Last administered on 4/21/20at 21:26;  Start 4/21/20 at 22:00; 

Stop 4/22/20 at 21:59;  Status DC


Daptomycin 430 mg/ Sodium Chloride 50 ml @  100 mls/hr Q48H IV ;  Start 4/23/20 

at 09:00;  Stop 4/22/20 at 11:55;  Status DC


Sodium Chloride 100 meq/Potassium Chloride 40 meq/ Magnesium Sulfate 20 

meq/Calcium Gluconate 15 meq/ Multivitamins 10 ml/Chromium/ Copper/Manganese/ 

Seleni/Zn 0.5 ml/ Insulin Human Regular 35 unit/ Total Parenteral 

Nutrition/Amino Acids/Dextrose/ Fat Emulsion Intravenous 1,400 ml @  58.333 mls/

hr TPN  CONT IV  Last administered on 4/22/20at 22:27;  Start 4/22/20 at 22:00; 

Stop 4/23/20 at 21:59;  Status DC


Daptomycin 430 mg/ Sodium Chloride 50 ml @  100 mls/hr Q24H IV  Last 

administered on 4/24/20at 15:07;  Start 4/22/20 at 13:00;  Stop 4/25/20 at 

13:15;  Status DC


Sodium Chloride 100 meq/Potassium Chloride 40 meq/ Magnesium Sulfate 20 

meq/Calcium Gluconate 10 meq/ Multivitamins 10 ml/Chromium/ Copper/Manganese/ 

Seleni/Zn 0.5 ml/ Insulin Human Regular 35 unit/ Total Parenteral 

Nutrition/Amino Acids/Dextrose/ Fat Emulsion Intravenous 1,400 ml @  58.333 mls/

hr TPN  CONT IV  Last administered on 4/24/20at 00:06;  Start 4/23/20 at 22:00; 

Stop 4/24/20 at 21:59;  Status DC


Alteplase, Recombinant (Cathflo For Central Catheter Clearance) 1 mg 1X  ONCE 

INT CAT  Last administered on 4/24/20at 11:44;  Start 4/24/20 at 10:45;  Stop 

4/24/20 at 10:46;  Status DC


Ondansetron HCl (Zofran) 4 mg PRN Q6HRS  PRN IV NAUSEA/VOMITING;  Start 4/27/20 

at 07:00;  Stop 4/28/20 at 06:59;  Status DC


Fentanyl Citrate (Fentanyl 2ml Vial) 25 mcg PRN Q5MIN  PRN IV MILD PAIN 1-3;  

Start 4/27/20 at 07:00;  Stop 4/28/20 at 06:59;  Status DC


Fentanyl Citrate (Fentanyl 2ml Vial) 50 mcg PRN Q5MIN  PRN IV MODERATE TO SEVERE

PAIN Last administered on 4/27/20at 10:17;  Start 4/27/20 at 07:00;  Stop 

4/28/20 at 06:59;  Status DC


Ringer's Solution 1,000 ml @  30 mls/hr Q24H IV ;  Start 4/27/20 at 07:00;  Stop

4/27/20 at 18:59;  Status DC


Lidocaine HCl (Xylocaine-Mpf 1% 2ml Vial) 2 ml PRN 1X  PRN ID PRIOR TO IV START;

 Start 4/27/20 at 07:00;  Stop 4/28/20 at 06:59;  Status DC


Prochlorperazine Edisylate (Compazine) 5 mg PACU PRN  PRN IV NAUSEA, MRX1;  

Start 4/27/20 at 07:00;  Stop 4/28/20 at 06:59;  Status DC


Sodium Acetate 50 meq/Potassium Acetate 55 meq/ Magnesium Sulfate 20 meq/Calcium

Gluconate 10 meq/ Multivitamins 10 ml/Chromium/ Copper/Manganese/ Seleni/Zn 0.5 

ml/ Insulin Human Regular 35 unit/ Total Parenteral Nutrition/Amino Aci

ds/Dextrose/ Fat Emulsion Intravenous 1,400 ml @  58.333 mls/ hr TPN  CONT IV ; 

Start 4/24/20 at 22:00;  Stop 4/24/20 at 14:15;  Status DC


Sodium Acetate 50 meq/Potassium Acetate 55 meq/ Magnesium Sulfate 20 meq/Calcium

Gluconate 10 meq/ Multivitamins 10 ml/Chromium/ Copper/Manganese/ Seleni/Zn 0.5 

ml/ Insulin Human Regular 35 unit/ Total Parenteral Nutrition/Amino 

Acids/Dextrose/ Fat Emulsion Intravenous 1,800 ml @  75 mls/hr TPN  CONT IV  

Last administered on 4/24/20at 22:38;  Start 4/24/20 at 22:00;  Stop 4/25/20 at 

21:59;  Status DC


Sodium Chloride 1,000 ml @  1,000 mls/hr Q1H PRN IV hypotension;  Start 4/24/20 

at 15:31;  Stop 4/24/20 at 21:30;  Status DC


Diphenhydramine HCl (Benadryl) 25 mg 1X PRN  PRN IV ITCHING;  Start 4/24/20 at 

15:45;  Stop 4/25/20 at 15:44;  Status DC


Diphenhydramine HCl (Benadryl) 25 mg 1X PRN  PRN IV ITCHING;  Start 4/24/20 at 

15:45;  Stop 4/25/20 at 15:44;  Status DC


Sodium Chloride 1,000 ml @  400 mls/hr Q2H30M PRN IV PATENCY;  Start 4/24/20 at 

15:31;  Stop 4/25/20 at 03:30;  Status DC


Info (PHARMACY MONITORING -- do not chart) 1 each PRN DAILY  PRN MC SEE 

COMMENTS;  Start 4/24/20 at 15:45;  Stop 5/26/20 at 14:14;  Status DC


Sodium Acetate 50 meq/Potassium Acetate 55 meq/ Magnesium Sulfate 20 meq/Calcium

Gluconate 10 meq/ Multivitamins 10 ml/Chromium/ Copper/Manganese/ Seleni/Zn 0.5 

ml/ Insulin Human Regular 35 unit/ Total Parenteral Nutrition/Amino Acid

s/Dextrose/ Fat Emulsion Intravenous 1,800 ml @  75 mls/hr TPN  CONT IV  Last 

administered on 4/25/20at 22:03;  Start 4/25/20 at 22:00;  Stop 4/26/20 at 

21:59;  Status DC


Daptomycin 430 mg/ Sodium Chloride 50 ml @  100 mls/hr Q24H IV  Last 

administered on 4/30/20at 13:00;  Start 4/25/20 at 13:00;  Stop 4/30/20 at 

20:58;  Status DC


Heparin Sodium (Porcine) 1000 unit/Sodium Chloride 1,001 ml @  1,001 mls/hr 1X  

ONCE IRR ;  Start 4/27/20 at 06:00;  Stop 4/27/20 at 06:59;  Status DC


Potassium Acetate 55 meq/Magnesium Sulfate 20 meq/ Calcium Gluconate 10 meq/ 

Multivitamins 10 ml/Chromium/ Copper/Manganese/ Seleni/Zn 0.5 ml/ Insulin Human 

Regular 35 unit/ Total Parenteral Nutrition/Amino Acids/Dextrose/ Fat Emulsion 

Intravenous 1,920 ml @  80 mls/hr TPN  CONT IV  Last administered on 4/26/20at 

22:10;  Start 4/26/20 at 22:00;  Stop 4/27/20 at 21:59;  Status DC


Dexamethasone Sodium Phosphate (Decadron) 4 mg STK-MED ONCE .ROUTE ;  Start 

4/27/20 at 10:56;  Stop 4/27/20 at 10:57;  Status DC


Ondansetron HCl (Zofran) 4 mg STK-MED ONCE .ROUTE ;  Start 4/27/20 at 10:56;  

Stop 4/27/20 at 10:57;  Status DC


Rocuronium Bromide (Zemuron) 50 mg STK-MED ONCE .ROUTE ;  Start 4/27/20 at 

10:56;  Stop 4/27/20 at 10:57;  Status DC


Fentanyl Citrate (Fentanyl 2ml Vial) 100 mcg STK-MED ONCE .ROUTE ;  Start 

4/27/20 at 10:56;  Stop 4/27/20 at 10:57;  Status DC


Bupivacaine HCl/ Epinephrine Bitart (Sensorcain-Epi 0.5%-1:[id number] Mpf) 30 ml 

STK-MED ONCE .ROUTE  Last administered on 4/27/20at 12:01;  Start 4/27/20 at 

10:58;  Stop 4/27/20 at 10:58;  Status DC


Cellulose (Surgicel Hemostat 2x14) 1 each STK-MED ONCE .ROUTE ;  Start 4/27/20 

at 10:58;  Stop 4/27/20 at 10:59;  Status DC


Iohexol (Omnipaque 300 Mg/ml) 50 ml STK-MED ONCE .ROUTE ;  Start 4/27/20 at 

10:58;  Stop 4/27/20 at 10:59;  Status DC


Cellulose (Surgicel Hemostat 4x8) 1 each STK-MED ONCE .ROUTE ;  Start 4/27/20 at

10:58;  Stop 4/27/20 at 10:59;  Status DC


Bisacodyl (Dulcolax Supp) 10 mg STK-MED ONCE .ROUTE ;  Start 4/27/20 at 10:59;  

Stop 4/27/20 at 10:59;  Status DC


Heparin Sodium (Porcine) 1000 unit/Sodium Chloride 1,001 ml @  1,001 mls/hr 1X  

ONCE IRR ;  Start 4/27/20 at 12:00;  Stop 4/27/20 at 12:59;  Status DC


Propofol 20 ml @ As Directed STK-MED ONCE IV ;  Start 4/27/20 at 11:05;  Stop 

4/27/20 at 11:05;  Status DC


Sevoflurane (Ultane) 90 ml STK-MED ONCE IH ;  Start 4/27/20 at 11:05;  Stop 

4/27/20 at 11:05;  Status DC


Sevoflurane (Ultane) 60 ml STK-MED ONCE IH ;  Start 4/27/20 at 12:26;  Stop 

4/27/20 at 12:27;  Status DC


Propofol 20 ml @ As Directed STK-MED ONCE IV ;  Start 4/27/20 at 12:26;  Stop 

4/27/20 at 12:27;  Status DC


Phenylephrine HCl (PHENYLEPHRINE in 0.9% NACL PF) 1 mg STK-MED ONCE IV ;  Start 

4/27/20 at 12:34;  Stop 4/27/20 at 12:34;  Status DC


Heparin Sodium (Porcine) (Heparin Sodium) 5,000 unit Q12HR SQ  Last administered

on 5/6/20at 20:57;  Start 4/27/20 at 21:00;  Stop 5/7/20 at 09:59;  Status DC


Sodium Chloride (Normal Saline Flush) 3 ml QSHIFT  PRN IV AFTER MEDS AND BLOOD 

DRAWS;  Start 4/27/20 at 13:45;  Status Cancel


Naloxone HCl (Narcan) 0.4 mg PRN Q2MIN  PRN IV SEE INSTRUCTIONS Last 

administered on 6/6/20at 15:15;  Start 4/27/20 at 13:45;  Stop 7/1/20 at 16:00; 

Status DC


Sodium Chloride 1,000 ml @  25 mls/hr Q24H IV  Last administered on 5/26/20at 

13:37;  Start 4/27/20 at 13:37;  Stop 5/29/20 at 13:09;  Status DC


Naloxone HCl (Narcan) 0.4 mg PRN Q2MIN  PRN IV SEE INSTRUCTIONS;  Start 4/27/20 

at 14:30;  Status UNV


Sodium Chloride 1,000 ml @  25 mls/hr Q24H IV ;  Start 4/27/20 at 14:30;  Status

UNV


Hydromorphone HCl 30 ml @ 0 mls/hr CONT PRN  PRN IV PER PROTOCOL Last adm

inistered on 5/2/20at 16:08;  Start 4/27/20 at 14:30;  Stop 5/4/20 at 08:55;  

Status DC


Potassium Acetate 55 meq/Magnesium Sulfate 20 meq/ Calcium Gluconate 10 meq/ 

Multivitamins 10 ml/Chromium/ Copper/Manganese/ Seleni/Zn 0.5 ml/ Insulin Human 

Regular 35 unit/ Total Parenteral Nutrition/Amino Acids/Dextrose/ Fat Emulsion 

Intravenous 1,920 ml @  80 mls/hr TPN  CONT IV  Last administered on 4/27/20at 

22:01;  Start 4/27/20 at 22:00;  Stop 4/28/20 at 21:59;  Status DC


Bumetanide (Bumex) 2 mg [id number] IV  Last administered on 5/1/20at 13:50;  Start 

4/28/20 at 14:00;  Stop 5/2/20 at 14:10;  Status DC


Meropenem 1 gm/ Sodium Chloride 100 ml @  200 mls/hr Q8HRS IV  Last administered

on 5/22/20at 05:53;  Start 4/28/20 at 14:00;  Stop 5/22/20 at 09:31;  Status DC


Potassium Acetate 55 meq/Magnesium Sulfate 20 meq/ Calcium Gluconate 10 meq/ 

Multivitamins 10 ml/Chromium/ Copper/Manganese/ Seleni/Zn 0.5 ml/ Insulin Human 

Regular 35 unit/ Total Parenteral Nutrition/Amino Acids/Dextrose/ Fat Emulsion 

Intravenous 1,920 ml @  80 mls/hr TPN  CONT IV  Last administered on 4/28/20at 

22:02;  Start 4/28/20 at 22:00;  Stop 4/29/20 at 21:59;  Status DC


Hydromorphone HCl (Dilaudid Standard PCA) 12 mg STK-MED ONCE IV ;  Start 4/27/20

at 14:35;  Stop 4/28/20 at 13:53;  Status DC


Artificial Tears (Artificial Tears) 1 drop PRN Q15MIN  PRN OU DRY EYE Last 

administered on 6/23/20at 21:17;  Start 4/29/20 at 05:30


Hydromorphone HCl (Dilaudid Standard PCA) 12 mg STK-MED ONCE IV ;  Start 4/28/20

at 12:05;  Stop 4/29/20 at 09:15;  Status DC


Potassium Acetate 65 meq/Magnesium Sulfate 20 meq/ Calcium Gluconate 10 meq/ 

Multivitamins 10 ml/Chromium/ Copper/Manganese/ Seleni/Zn 0.5 ml/ Insulin Human 

Regular 30 unit/ Total Parenteral Nutrition/Amino Acids/Dextrose/ Fat Emulsion 

Intravenous 1,920 ml @  80 mls/hr TPN  CONT IV  Last administered on 4/29/20at 

22:22;  Start 4/29/20 at 22:00;  Stop 4/30/20 at 21:59;  Status DC


Cyclobenzaprine HCl (Flexeril) 10 mg PRN Q6HRS  PRN PO MUSCLE SPASMS Last 

administered on 7/10/20at 19:12;  Start 4/30/20 at 10:45


Potassium Acetate 55 meq/Magnesium Sulfate 20 meq/ Calcium Gluconate 10 meq/ 

Multivitamins 10 ml/Chromium/ Copper/Manganese/ Seleni/Zn 0.5 ml/ Insulin Human 

Regular 30 unit/ Total Parenteral Nutrition/Amino Acids/Dextrose/ Fat Emulsion 

Intravenous 1,920 ml @  80 mls/hr TPN  CONT IV  Last administered on 5/1/20at 

01:00;  Start 4/30/20 at 22:00;  Stop 5/1/20 at 21:59;  Status DC


Magnesium Sulfate 50 ml @ 25 mls/hr 1X  ONCE IV  Last administered on 4/30/20at 

17:18;  Start 4/30/20 at 12:45;  Stop 4/30/20 at 14:44;  Status DC


Potassium Chloride/Water 100 ml @  100 mls/hr 1X  ONCE IV  Last administered on 

5/1/20at 11:27;  Start 5/1/20 at 12:00;  Stop 5/1/20 at 12:59;  Status DC


Hydromorphone HCl (Dilaudid Standard PCA) 12 mg STK-MED ONCE IV ;  Start 4/29/20

at 10:50;  Stop 5/1/20 at 11:02;  Status DC


Hydromorphone HCl (Dilaudid Standard PCA) 12 mg STK-MED ONCE IV ;  Start 4/30/20

at 13:47;  Stop 5/1/20 at 11:03;  Status DC


Potassium Acetate 30 meq/Magnesium Sulfate 20 meq/ Calcium Gluconate 10 meq/ 

Multivitamins 10 ml/Chromium/ Copper/Manganese/ Seleni/Zn 0.5 ml/ Insulin Human 

Regular 30 unit/ Potassium Chloride 30 meq/ Total Parenteral Nutrition/Amino 

Acids/Dextrose/ Fat Emulsion Intravenous 1,920 ml @  80 mls/hr TPN  CONT IV  

Last administered on 5/1/20at 22:34;  Start 5/1/20 at 22:00;  Stop 5/2/20 at 

21:59;  Status DC


Potassium Chloride/Water 100 ml @  100 mls/hr Q1H IV  Last administered on 5/2/ 20at 13:05;  Start 5/2/20 at 07:00;  Stop 5/2/20 at 10:59;  Status DC


Magnesium Sulfate 50 ml @ 25 mls/hr 1X  ONCE IV  Last administered on 5/2/20at 

10:34;  Start 5/2/20 at 10:30;  Stop 5/2/20 at 12:29;  Status DC


Potassium Chloride 75 meq/ Magnesium Sulfate 20 meq/Calcium Gluconate 10 meq/ 

Multivitamins 10 ml/Chromium/ Copper/Manganese/ Seleni/Zn 0.5 ml/ Insulin Human 

Regular 30 unit/ Total Parenteral Nutrition/Amino Acids/Dextrose/ Fat Emulsion 

Intravenous 1,920 ml @  80 mls/hr TPN  CONT IV  Last administered on 5/2/20at 

21:51;  Start 5/2/20 at 22:00;  Stop 5/3/20 at 22:00;  Status DC


Potassium Chloride 75 meq/ Magnesium Sulfate 20 meq/Calcium Gluconate 10 meq/ 

Multivitamins 10 ml/Chromium/ Copper/Manganese/ Seleni/Zn 0.5 ml/ Insulin Human 

Regular 25 unit/ Total Parenteral Nutrition/Amino Acids/Dextrose/ Fat Emulsion 

Intravenous 1,920 ml @  80 mls/hr TPN  CONT IV  Last administered on 5/3/20at 

22:04;  Start 5/3/20 at 22:00;  Stop 5/4/20 at 21:59;  Status DC


Hydromorphone HCl (Dilaudid) 0.4 mg PRN Q4HRS  PRN IVP PAIN Last administered on

5/4/20at 10:57;  Start 5/4/20 at 09:00;  Stop 5/4/20 at 18:59;  Status DC


Micafungin Sodium 100 mg/Dextrose 100 ml @  100 mls/hr Q24H IV  Last 

administered on 5/26/20at 12:17;  Start 5/4/20 at 11:00;  Stop 5/27/20 at 09:59;

 Status DC


Daptomycin 485 mg/ Sodium Chloride 50 ml @  100 mls/hr Q24H IV  Last 

administered on 5/11/20at 13:10;  Start 5/4/20 at 11:00;  Stop 5/12/20 at 07:44;

 Status DC


Potassium Chloride 75 meq/ Magnesium Sulfate 15 meq/Calcium Gluconate 8 meq/ 

Multivitamins 10 ml/Chromium/ Copper/Manganese/ Seleni/Zn 0.5 ml/ Insulin Human 

Regular 25 unit/ Total Parenteral Nutrition/Amino Acids/Dextrose/ Fat Emulsion 

Intravenous 1,920 ml @  80 mls/hr TPN  CONT IV  Last administered on 5/4/20at 

23:08;  Start 5/4/20 at 22:00;  Stop 5/5/20 at 21:59;  Status DC


Haloperidol Lactate (Haldol Inj) 3 mg 1X  ONCE IVP  Last administered on 

5/4/20at 14:37;  Start 5/4/20 at 14:30;  Stop 5/4/20 at 14:31;  Status DC


Hydromorphone HCl (Dilaudid) 1 mg PRN Q4HRS  PRN IVP PAIN Last administered on 

5/18/20at 06:25;  Start 5/4/20 at 19:00;  Stop 5/18/20 at 17:10;  Status DC


Potassium Chloride 75 meq/ Magnesium Sulfate 15 meq/Calcium Gluconate 8 meq/ 

Multivitamins 10 ml/Chromium/ Copper/Manganese/ Seleni/Zn 0.5 ml/ Insulin Human 

Regular 20 unit/ Total Parenteral Nutrition/Amino Acids/Dextrose/ Fat Emulsion 

Intravenous 1,920 ml @  80 mls/hr TPN  CONT IV  Last administered on 5/5/20at 

22:10;  Start 5/5/20 at 22:00;  Stop 5/6/20 at 21:59;  Status DC


Lidocaine HCl (Buffered Lidocaine 1%) 3 ml STK-MED ONCE .ROUTE ;  Start 5/6/20 

at 11:31;  Stop 5/6/20 at 11:31;  Status DC


Lidocaine HCl (Buffered Lidocaine 1%) 3 ml STK-MED ONCE .ROUTE ;  Start 5/6/20 

at 12:28;  Stop 5/6/20 at 12:29;  Status DC


Lidocaine HCl (Buffered Lidocaine 1%) 6 ml 1X  ONCE INJ  Last administered on 

5/6/20at 12:53;  Start 5/6/20 at 12:45;  Stop 5/6/20 at 12:46;  Status DC


Potassium Chloride 75 meq/ Magnesium Sulfate 15 meq/Calcium Gluconate 8 meq/ 

Multivitamins 10 ml/Chromium/ Copper/Manganese/ Seleni/Zn 0.5 ml/ Insulin Human 

Regular 20 unit/ Total Parenteral Nutrition/Amino Acids/Dextrose/ Fat Emulsion 

Intravenous 1,920 ml @  80 mls/hr TPN  CONT IV  Last administered on 5/6/20at 

22:00;  Start 5/6/20 at 22:00;  Stop 5/7/20 at 21:59;  Status DC


Potassium Chloride 75 meq/ Magnesium Sulfate 15 meq/Calcium Gluconate 8 meq/ 

Multivitamins 10 ml/Chromium/ Copper/Manganese/ Seleni/Zn 0.5 ml/ Insulin Human 

Regular 15 unit/ Total Parenteral Nutrition/Amino Acids/Dextrose/ Fat Emulsion 

Intravenous 1,920 ml @  80 mls/hr TPN  CONT IV  Last administered on 5/7/20at 

22:28;  Start 5/7/20 at 22:00;  Stop 5/8/20 at 21:59;  Status DC


Vecuronium Bromide (Norcuron Bolus) 6 mg PRN Q6HRS  PRN IV VENT ASYNCHRONY;  

Start 5/7/20 at 19:15;  Stop 5/7/20 at 19:35;  Status DC


Bumetanide (Bumex) 2 mg 1X  ONCE IV  Last administered on 5/7/20at 22:09;  Start

5/7/20 at 19:45;  Stop 5/7/20 at 19:46;  Status DC


Lidocaine HCl (Buffered Lidocaine 1%) 3 ml STK-MED ONCE .ROUTE ;  Start 5/8/20 

at 07:59;  Stop 5/8/20 at 07:59;  Status DC


Midazolam HCl (Versed) 5 mg STK-MED ONCE .ROUTE ;  Start 5/8/20 at 08:36;  Stop 

5/8/20 at 08:36;  Status DC


Fentanyl Citrate (Fentanyl 5ml Vial) 250 mcg STK-MED ONCE .ROUTE ;  Start 5/8/20

at 08:36;  Stop 5/8/20 at 08:37;  Status DC


Lidocaine HCl (Buffered Lidocaine 1%) 3 ml 1X  ONCE IJ  Last administered on 

5/8/20at 09:30;  Start 5/8/20 at 09:15;  Stop 5/8/20 at 09:16;  Status DC


Midazolam HCl (Versed) 5 mg 1X  ONCE IV  Last administered on 5/8/20at 09:30;  

Start 5/8/20 at 09:15;  Stop 5/8/20 at 09:16;  Status DC


Fentanyl Citrate (Fentanyl 5ml Vial) 250 mcg 1X  ONCE IV  Last administered on 

5/8/20at 09:30;  Start 5/8/20 at 09:15;  Stop 5/8/20 at 09:16;  Status DC


Bumetanide (Bumex) 2 mg DAILY IV  Last administered on 5/18/20at 08:07;  Start 

5/8/20 at 10:00;  Stop 5/18/20 at 17:15;  Status DC


Potassium Chloride 75 meq/ Magnesium Sulfate 15 meq/ Multivitamins 10 

ml/Chromium/ Copper/Manganese/ Seleni/Zn 0.5 ml/ Insulin Human Regular 15 unit/ 

Total Parenteral Nutrition/Amino Acids/Dextrose/ Fat Emulsion Intravenous 1,920 

ml @  80 mls/hr TPN  CONT IV  Last administered on 5/8/20at 21:59;  Start 5/8/20

at 22:00;  Stop 5/9/20 at 21:59;  Status DC


Metoclopramide HCl (Reglan Vial) 10 mg PRN Q3HRS  PRN IVP NAUSEA/VOMITING-3rd 

choice Last administered on 5/14/20at 04:25;  Start 5/9/20 at 16:45


Potassium Chloride 75 meq/ Magnesium Sulfate 15 meq/ Multivitamins 10 

ml/Chromium/ Copper/Manganese/ Seleni/Zn 0.5 ml/ Insulin Human Regular 15 unit/ 

Total Parenteral Nutrition/Amino Acids/Dextrose/ Fat Emulsion Intravenous 1,920 

ml @  80 mls/hr TPN  CONT IV  Last administered on 5/9/20at 22:41;  Start 5/9/20

at 22:00;  Stop 5/10/20 at 21:59;  Status DC


Magnesium Sulfate 50 ml @ 25 mls/hr 1X  ONCE IV  Last administered on 5/10/20at 

10:44;  Start 5/10/20 at 09:00;  Stop 5/10/20 at 10:59;  Status DC


Potassium Chloride/Water 100 ml @  100 mls/hr 1X  ONCE IV  Last administered on 

5/10/20at 09:37;  Start 5/10/20 at 09:00;  Stop 5/10/20 at 09:59;  Status DC


Duloxetine HCl (Cymbalta) 30 mg DAILY PO  Last administered on 5/11/20at 09:48; 

Start 5/10/20 at 14:00;  Stop 5/13/20 at 10:25;  Status DC


Potassium Chloride 80 meq/ Magnesium Sulfate 20 meq/ Multivitamins 10 

ml/Chromium/ Copper/Manganese/ Seleni/Zn 0.5 ml/ Insulin Human Regular 15 unit/ 

Total Parenteral Nutrition/Amino Acids/Dextrose/ Fat Emulsion Intravenous 1,920 

ml @  80 mls/hr TPN  CONT IV  Last administered on 5/10/20at 21:42;  Start 

5/10/20 at 22:00;  Stop 5/11/20 at 21:59;  Status DC


Potassium Chloride 80 meq/ Magnesium Sulfate 20 meq/ Multivitamins 10 

ml/Chromium/ Copper/Manganese/ Seleni/Zn 0.5 ml/ Insulin Human Regular 15 unit/ 

Total Parenteral Nutrition/Amino Acids/Dextrose/ Fat Emulsion Intravenous 1,920 

ml @  80 mls/hr TPN  CONT IV  Last administered on 5/11/20at 22:20;  Start 

5/11/20 at 22:00;  Stop 5/12/20 at 21:59;  Status DC


Lidocaine HCl (Buffered Lidocaine 1%) 3 ml STK-MED ONCE .ROUTE ;  Start 5/12/20 

at 09:54;  Stop 5/12/20 at 09:55;  Status DC


Hydromorphone HCl (Dilaudid Standard PCA) 12 mg STK-MED ONCE IV ;  Start 5/1/20 

at 15:50;  Stop 5/12/20 at 11:24;  Status DC


Potassium Chloride 80 meq/ Magnesium Sulfate 20 meq/ Multivitamins 10 

ml/Chromium/ Copper/Manganese/ Seleni/Zn 0.5 ml/ Insulin Human Regular 15 unit/ 

Total Parenteral Nutrition/Amino Acids/Dextrose/ Fat Emulsion Intravenous 1,920 

ml @  80 mls/hr TPN  CONT IV  Last administered on 5/12/20at 21:40;  Start 

5/12/20 at 22:00;  Stop 5/13/20 at 21:59;  Status DC


Lidocaine HCl (Buffered Lidocaine 1%) 6 ml 1X  ONCE INJ  Last administered on 

5/12/20at 14:15;  Start 5/12/20 at 14:15;  Stop 5/12/20 at 14:16;  Status DC


Potassium Chloride 80 meq/ Magnesium Sulfate 20 meq/ Multivitamins 10 

ml/Chromium/ Copper/Manganese/ Seleni/Zn 1 ml/ Insulin Human Regular 15 unit/ 

Total Parenteral Nutrition/Amino Acids/Dextrose/ Fat Emulsion Intravenous 1,920 

ml @  80 mls/hr TPN  CONT IV  Last administered on 5/13/20at 22:04;  Start 

5/13/20 at 22:00;  Stop 5/14/20 at 21:59;  Status DC


Potassium Chloride/Water 100 ml @  100 mls/hr 1X  ONCE IV  Last administered on 

5/14/20at 11:34;  Start 5/14/20 at 11:00;  Stop 5/14/20 at 11:59;  Status DC


Potassium Chloride 90 meq/ Magnesium Sulfate 20 meq/ Multivitamins 10 

ml/Chromium/ Copper/Manganese/ Seleni/Zn 1 ml/ Insulin Human Regular 15 unit/ 

Total Parenteral Nutrition/Amino Acids/Dextrose/ Fat Emulsion Intravenous 1,920 

ml @  80 mls/hr TPN  CONT IV  Last administered on 5/14/20at 22:57;  Start 

5/14/20 at 22:00;  Stop 5/15/20 at 21:59;  Status DC


Potassium Chloride 90 meq/ Magnesium Sulfate 20 meq/ Multivitamins 10 

ml/Chromium/ Copper/Manganese/ Seleni/Zn 1 ml/ Insulin Human Regular 15 unit/ 

Total Parenteral Nutrition/Amino Acids/Dextrose/ Fat Emulsion Intravenous 1,920 

ml @  80 mls/hr TPN  CONT IV  Last administered on 5/15/20at 22:48;  Start 

5/15/20 at 22:00;  Stop 5/16/20 at 21:59;  Status DC


Potassium Chloride 90 meq/ Magnesium Sulfate 20 meq/ Multivitamins 10 

ml/Chromium/ Copper/Manganese/ Seleni/Zn 1 ml/ Insulin Human Regular 15 unit/ 

Total Parenteral Nutrition/Amino Acids/Dextrose/ Fat Emulsion Intravenous 1,890 

ml @  78.75 mls/ hr TPN  CONT IV  Last administered on 5/16/20at 22:15;  Start 

5/16/20 at 22:00;  Stop 5/17/20 at 21:59;  Status DC


Linezolid/Dextrose 300 ml @  300 mls/hr Q12HR IV  Last administered on 5/19/20at

21:08;  Start 5/17/20 at 09:00;  Stop 5/20/20 at 08:11;  Status DC


Daptomycin 450 mg/ Sodium Chloride 50 ml @  100 mls/hr Q24H IV  Last 

administered on 5/20/20at 09:25;  Start 5/17/20 at 09:00;  Stop 5/21/20 at 

08:30;  Status DC


Potassium Chloride 90 meq/ Magnesium Sulfate 20 meq/ Multivitamins 10 

ml/Chromium/ Copper/Manganese/ Seleni/Zn 1 ml/ Insulin Human Regular 15 unit/ 

Total Parenteral Nutrition/Amino Acids/Dextrose/ Fat Emulsion Intravenous 1,890 

ml @  78.75 mls/ hr TPN  CONT IV  Last administered on 5/17/20at 21:34;  Start 

5/17/20 at 22:00;  Stop 5/18/20 at 21:59;  Status DC


Lorazepam (Ativan Inj) 2 mg STK-MED ONCE .ROUTE ;  Start 5/17/20 at 14:58;  Stop

5/17/20 at 14:58;  Status DC


Metoprolol Tartrate (Lopressor Vial) 5 mg 1X  ONCE IVP  Last administered on 

5/17/20at 15:31;  Start 5/17/20 at 15:15;  Stop 5/17/20 at 15:16;  Status DC


Lorazepam (Ativan Inj) 2 mg 1X  ONCE IVP  Last administered on 5/17/20at 15:30; 

Start 5/17/20 at 15:15;  Stop 5/17/20 at 15:16;  Status DC


Enoxaparin Sodium (Lovenox 40mg Syringe) 40 mg Q24H SQ  Last administered on 

6/5/20at 17:44;  Start 5/17/20 at 17:00;  Stop 6/7/20 at 06:50;  Status DC


Lorazepam (Ativan Inj) 1 mg PRN Q4HRS  PRN IVP ANXIETY / AGITATION MILD-MOD Last

administered on 5/31/20at 15:55;  Start 5/17/20 at 19:15;  Stop 6/2/20 at 11:45;

 Status DC


Lorazepam (Ativan Inj) 2 mg PRN Q4HRS  PRN IVP ANXIETY / AGITATION SEVERE Last 

administered on 6/1/20at 07:55;  Start 5/17/20 at 19:15;  Stop 6/2/20 at 11:45; 

Status DC


Fentanyl Citrate (Fentanyl 2ml Vial) 50 mcg PRN Q4HRS  PRN IVP SEVERE PAIN Last 

administered on 6/13/20at 05:15;  Start 5/18/20 at 13:15;  Stop 6/14/20 at 

09:29;  Status DC


Fentanyl Citrate (Fentanyl 2ml Vial) 25 mcg PRN Q4HRS  PRN IVP MODERATE PAIN 

Last administered on 6/13/20at 00:27;  Start 5/18/20 at 13:15;  Stop 6/14/20 at 

09:30;  Status DC


Potassium Chloride 90 meq/ Magnesium Sulfate 20 meq/ Multivitamins 10 

ml/Chromium/ Copper/Manganese/ Seleni/Zn 1 ml/ Insulin Human Regular 15 unit/ 

Total Parenteral Nutrition/Amino Acids/Dextrose/ Fat Emulsion Intravenous 1,890 

ml @  78.75 mls/ hr TPN  CONT IV  Last administered on 5/18/20at 22:18;  Start 

5/18/20 at 22:00;  Stop 5/19/20 at 21:59;  Status DC


Furosemide (Lasix) 40 mg 1X  ONCE IVP  Last administered on 5/18/20at 21:51;  

Start 5/18/20 at 21:45;  Stop 5/18/20 at 21:48;  Status DC


Albumin Human 100 ml @  100 mls/hr 1X PRN  PRN IV SEE COMMENTS;  Start 5/19/20 

at 01:30


Furosemide (Lasix) 40 mg [id number] IVP  Last administered on 6/3/20at 08:04;  Start 

5/19/20 at 14:00;  Stop 6/3/20 at 13:07;  Status DC


Potassium Chloride 90 meq/ Magnesium Sulfate 20 meq/ Multivitamins 10 

ml/Chromium/ Copper/Manganese/ Seleni/Zn 1 ml/ Insulin Human Regular 15 unit/ 

Total Parenteral Nutrition/Amino Acids/Dextrose/ Fat Emulsion Intravenous 1,800 

ml @  75 mls/hr TPN  CONT IV  Last administered on 5/19/20at 22:31;  Start 

5/19/20 at 22:00;  Stop 5/20/20 at 21:59;  Status DC


Potassium Chloride 90 meq/ Magnesium Sulfate 20 meq/ Multivitamins 10 ml/

Chromium/ Copper/Manganese/ Seleni/Zn 1 ml/ Insulin Human Regular 15 unit/ Total

Parenteral Nutrition/Amino Acids/Dextrose/ Fat Emulsion Intravenous 1,800 ml @  

75 mls/hr TPN  CONT IV  Last administered on 5/20/20at 22:28;  Start 5/20/20 at 

22:00;  Stop 5/21/20 at 21:59;  Status DC


Potassium Chloride 110 meq/ Magnesium Sulfate 20 meq/ Multivitamins 10 

ml/Chromium/ Copper/Manganese/ Seleni/Zn 1 ml/ Insulin Human Regular 15 unit/ 

Total Parenteral Nutrition/Amino Acids/Dextrose/ Fat Emulsion Intravenous 1,800 

ml @  75 mls/hr TPN  CONT IV  Last administered on 5/21/20at 22:01;  Start 

5/21/20 at 22:00;  Stop 5/22/20 at 21:59;  Status DC


Saliva Substitute (Biotene Moisturizing Mouth) 2 spray PRN Q15MIN  PRN PO DRY 

MOUTH;  Start 5/21/20 at 11:00


Potassium Chloride 110 meq/ Magnesium Sulfate 20 meq/ Multivitamins 10 

ml/Chromium/ Copper/Manganese/ Seleni/Zn 1 ml/ Insulin Human Regular 15 unit/ 

Total Parenteral Nutrition/Amino Acids/Dextrose/ Fat Emulsion Intravenous 1,800 

ml @  75 mls/hr TPN  CONT IV  Last administered on 5/22/20at 22:21;  Start 

5/22/20 at 22:00;  Stop 5/23/20 at 21:59;  Status DC


Potassium Chloride 110 meq/ Magnesium Sulfate 20 meq/ Multivitamins 10 

ml/Chromium/ Copper/Manganese/ Seleni/Zn 1 ml/ Insulin Human Regular 15 unit/ 

Total Parenteral Nutrition/Amino Acids/Dextrose/ Fat Emulsion Intravenous 1,800 

ml @  75 mls/hr TPN  CONT IV  Last administered on 5/23/20at 22:04;  Start 

5/23/20 at 22:00;  Stop 5/24/20 at 21:59;  Status DC


Potassium Chloride 110 meq/ Magnesium Sulfate 20 meq/ Multivitamins 10 ml

/Chromium/ Copper/Manganese/ Seleni/Zn 1 ml/ Insulin Human Regular 15 unit/ 

Total Parenteral Nutrition/Amino Acids/Dextrose/ Fat Emulsion Intravenous 1,800 

ml @  75 mls/hr TPN  CONT IV  Last administered on 5/24/20at 22:48;  Start 

5/24/20 at 22:00;  Stop 5/25/20 at 21:59;  Status DC


Potassium Chloride 70 meq/ Magnesium Sulfate 20 meq/ Multivitamins 10 

ml/Chromium/ Copper/Manganese/ Seleni/Zn 1 ml/ Insulin Human Regular 15 unit/ 

Total Parenteral Nutrition/Amino Acids/Dextrose/ Fat Emulsion Intravenous 1,800 

ml @  75 mls/hr TPN  CONT IV  Last administered on 5/25/20at 21:39;  Start 

5/25/20 at 22:00;  Stop 5/26/20 at 21:59;  Status DC


Meropenem 500 mg/ Sodium Chloride 50 ml @  100 mls/hr Q6HRS IV  Last 

administered on 5/27/20at 06:02;  Start 5/25/20 at 18:00;  Stop 5/27/20 at 

09:59;  Status DC


Barium Sulfate (Varibar Thin Liquid Apple) 148 gm 1X  ONCE PO ;  Start 5/26/20 

at 11:45;  Stop 5/26/20 at 11:49;  Status DC


Potassium Chloride 70 meq/ Magnesium Sulfate 20 meq/ Multivitamins 10 

ml/Chromium/ Copper/Manganese/ Seleni/Zn 1 ml/ Insulin Human Regular 15 unit/ 

Total Parenteral Nutrition/Amino Acids/Dextrose/ Fat Emulsion Intravenous 1,800 

ml @  75 mls/hr TPN  CONT IV  Last administered on 5/26/20at 22:27;  Start 

5/26/20 at 22:00;  Stop 5/27/20 at 21:59;  Status DC


Piperacillin Sod/ Tazobactam Sod 3.375 gm/Sodium Chloride 50 ml @  100 mls/hr 

Q6HRS IV  Last administered on 6/4/20at 06:10;  Start 5/27/20 at 12:00;  Stop 

6/4/20 at 07:26;  Status DC


Potassium Chloride 70 meq/ Magnesium Sulfate 20 meq/ Multivitamins 10 

ml/Chromium/ Copper/Manganese/ Seleni/Zn 1 ml/ Insulin Human Regular 15 unit/ 

Total Parenteral Nutrition/Amino Acids/Dextrose/ Fat Emulsion Intravenous 1,800 

ml @  75 mls/hr TPN  CONT IV  Last administered on 5/27/20at 22:03;  Start 

5/27/20 at 22:00;  Stop 5/28/20 at 21:59;  Status DC


Potassium Chloride 70 meq/ Magnesium Sulfate 20 meq/ Multivitamins 10 

ml/Chromium/ Copper/Manganese/ Seleni/Zn 1 ml/ Insulin Human Regular 15 unit/ 

Total Parenteral Nutrition/Amino Acids/Dextrose/ Fat Emulsion Intravenous 1,800 

ml @  75 mls/hr TPN  CONT IV  Last administered on 5/28/20at 22:33;  Start 

5/28/20 at 22:00;  Stop 5/29/20 at 21:59;  Status DC


Potassium Chloride 70 meq/ Magnesium Sulfate 20 meq/ Multivitamins 10 

ml/Chromium/ Copper/Manganese/ Seleni/Zn 1 ml/ Insulin Human Regular 15 unit/ 

Total Parenteral Nutrition/Amino Acids/Dextrose/ Fat Emulsion Intravenous 1,800 

ml @  75 mls/hr TPN  CONT IV  Last administered on 5/29/20at 23:13;  Start 

5/29/20 at 22:00;  Stop 5/30/20 at 21:59;  Status DC


Potassium Chloride 80 meq/ Magnesium Sulfate 20 meq/ Multivitamins 10 

ml/Chromium/ Copper/Manganese/ Seleni/Zn 1 ml/ Insulin Human Regular 15 unit/ 

Total Parenteral Nutrition/Amino Acids/Dextrose/ Fat Emulsion Intravenous 1,800 

ml @  75 mls/hr TPN  CONT IV  Last administered on 5/30/20at 22:30;  Start 

5/30/20 at 22:00;  Stop 5/31/20 at 21:59;  Status DC


Potassium Chloride 80 meq/ Magnesium Sulfate 20 meq/ Multivitamins 10 

ml/Chromium/ Copper/Manganese/ Seleni/Zn 1 ml/ Insulin Human Regular 15 unit/ 

Total Parenteral Nutrition/Amino Acids/Dextrose/ Fat Emulsion Intravenous 1,800 

ml @  75 mls/hr TPN  CONT IV  Last administered on 5/31/20at 21:54;  Start 

5/31/20 at 22:00;  Stop 6/1/20 at 21:59;  Status DC


Potassium Chloride/Water 100 ml @  100 mls/hr 1X  ONCE IV  Last administered on 

6/1/20at 10:15;  Start 6/1/20 at 10:00;  Stop 6/1/20 at 10:59;  Status DC


Potassium Chloride 90 meq/ Magnesium Sulfate 20 meq/ Multivitamins 10 

ml/Chromium/ Copper/Manganese/ Seleni/Zn 1 ml/ Insulin Human Regular 20 unit/ 

Total Parenteral Nutrition/Amino Acids/Dextrose/ Fat Emulsion Intravenous 1,800 

ml @  75 mls/hr TPN  CONT IV  Last administered on 6/1/20at 22:28;  Start 6/1/20

at 22:00;  Stop 6/2/20 at 21:59;  Status DC


Potassium Chloride 90 meq/ Magnesium Sulfate 20 meq/ Multivitamins 10 

ml/Chromium/ Copper/Manganese/ Seleni/Zn 1 ml/ Insulin Human Regular 20 unit/ 

Total Parenteral Nutrition/Amino Acids/Dextrose/ Fat Emulsion Intravenous 1,800 

ml @  75 mls/hr TPN  CONT IV  Last administered on 6/2/20at 22:08;  Start 6/2/20

at 22:00;  Stop 6/3/20 at 21:59;  Status DC


Lorazepam (Ativan Inj) 0.25 mg PRN Q4HRS  PRN IVP ANXIETY / AGITATION Last 

administered on 8/2/20at 23:07;  Start 6/3/20 at 07:30


Potassium Chloride 90 meq/ Magnesium Sulfate 20 meq/ Multivitamins 10 

ml/Chromium/ Copper/Manganese/ Seleni/Zn 1 ml/ Insulin Human Regular 20 unit/ 

Total Parenteral Nutrition/Amino Acids/Dextrose/ Fat Emulsion Intravenous 1,800 

ml @  75 mls/hr TPN  CONT IV  Last administered on 6/3/20at 23:13;  Start 6/3/20

at 22:00;  Stop 6/4/20 at 21:59;  Status DC


Furosemide (Lasix) 40 mg DAILY IVP  Last administered on 6/5/20at 11:14;  Start 

6/3/20 at 13:30;  Stop 6/7/20 at 09:12;  Status DC


Fluoxetine HCl (PROzac) 20 mg QHS PEG  Last administered on 8/2/20at 20:55;  

Start 6/4/20 at 21:00


Fentanyl (Duragesic 50mcg/ Hr Patch) 1 patch Q72H TD  Last administered on 

6/4/20at 21:22;  Start 6/4/20 at 21:00;  Stop 6/13/20 at 12:00;  Status DC


Potassium Chloride 40 meq/ Potassium Acetate 60 meq/Magnesium Sulfate 10 meq/ 

Multivitamins 10 ml/Chromium/ Copper/Manganese/ Seleni/Zn 1 ml/ Insulin Human 

Regular 20 unit/ Total Parenteral Nutrition/Amino Acids/Dextrose/ Fat Emulsion 

Intravenous 1,800 ml @  75 mls/hr TPN  CONT IV  Last administered on 6/5/20at 

00:03;  Start 6/4/20 at 22:00;  Stop 6/5/20 at 21:59;  Status DC


Potassium Acetate 80 meq/Magnesium Sulfate 5 meq/ Multivitamins 10 ml/Chromium/ 

Copper/Manganese/ Seleni/Zn 1 ml/ Insulin Human Regular 20 unit/ Total Lisa

ral Nutrition/Amino Acids/Dextrose/ Fat Emulsion Intravenous 1,920 ml @  80 

mls/hr TPN  CONT IV  Last administered on 6/5/20at 21:59;  Start 6/5/20 at 

22:00;  Stop 6/6/20 at 21:59;  Status DC


Potassium Acetate 60 meq/Magnesium Sulfate 5 meq/ Multivitamins 10 ml/Chromium/ 

Copper/Manganese/ Seleni/Zn 1 ml/ Insulin Human Regular 30 unit/ Total 

Parenteral Nutrition/Amino Acids/Dextrose/ Fat Emulsion Intravenous 1,920 ml @  

80 mls/hr TPN  CONT IV  Last administered on 6/6/20at 21:54;  Start 6/6/20 at 

22:00;  Stop 6/7/20 at 21:59;  Status DC


Norepinephrine Bitartrate 8 mg/ Dextrose 258 ml @  13.332 mls/ hr CONT  PRN IV 

PER PROTOCOL Last administered on 7/2/20at 09:09;  Start 6/7/20 at 06:30


Albumin Human 500 ml @  125 mls/hr 1X  ONCE IV  Last administered on 6/7/20at 

08:10;  Start 6/7/20 at 08:15;  Stop 6/7/20 at 12:14;  Status DC


Potassium Acetate 40 meq/Magnesium Sulfate 5 meq/ Multivitamins 10 ml/Chromium/ 

Copper/Manganese/ Seleni/Zn 1 ml/ Insulin Human Regular 30 unit/ Total 

Parenteral Nutrition/Amino Acids/Dextrose/ Fat Emulsion Intravenous 1,920 ml @  

80 mls/hr TPN  CONT IV  Last administered on 6/7/20at 22:23;  Start 6/7/20 at 

22:00;  Stop 6/8/20 at 21:59;  Status DC


Meropenem 1 gm/ Sodium Chloride 100 ml @  200 mls/hr Q8HRS IV ;  Start 6/7/20 at

14:00;  Status Cancel


Meropenem 1 gm/ Sodium Chloride 100 ml @  200 mls/hr Q8HRS IV  Last administered

on 6/7/20at 11:04;  Start 6/7/20 at 10:00;  Stop 6/7/20 at 13:00;  Status DC


Meropenem 1 gm/ Sodium Chloride 100 ml @  200 mls/hr Q12HR IV  Last administered

on 6/25/20at 08:27;  Start 6/7/20 at 21:00;  Stop 6/25/20 at 08:56;  Status DC


Sodium Chloride 1,000 ml @  1,000 mls/hr 1X  ONCE IV  Last administered on 

6/7/20at 11:06;  Start 6/7/20 at 10:45;  Stop 6/7/20 at 11:44;  Status DC


Micafungin Sodium 100 mg/Dextrose 100 ml @  100 mls/hr Q24H IV  Last 

administered on 6/24/20at 12:34;  Start 6/7/20 at 11:00;  Stop 6/25/20 at 08:56;

 Status DC


Daptomycin 410 mg/ Sodium Chloride 50 ml @  100 mls/hr Q24H IV  Last 

administered on 6/9/20at 13:33;  Start 6/7/20 at 14:00;  Stop 6/10/20 at 08:30; 

Status DC


Midazolam HCl (Versed) 2 mg STK-MED ONCE .ROUTE ;  Start 6/7/20 at 14:47;  Stop 

6/7/20 at 14:48;  Status DC


Fentanyl Citrate (Fentanyl 2ml Vial) 100 mcg STK-MED ONCE .ROUTE ;  Start 6/7/20

at 14:47;  Stop 6/7/20 at 14:48;  Status DC


Flumazenil (Romazicon) 0.5 mg STK-MED ONCE IV ;  Start 6/7/20 at 14:48;  Stop 

6/7/20 at 14:48;  Status DC


Naloxone HCl (Narcan) 0.4 mg STK-MED ONCE .ROUTE ;  Start 6/7/20 at 14:48;  Stop

6/7/20 at 14:48;  Status DC


Lidocaine HCl (Lidocaine 1% 20ml Vial) 20 ml STK-MED ONCE .ROUTE ;  Start 6/7/20

at 14:48;  Stop 6/7/20 at 14:48;  Status DC


Midazolam HCl (Versed) 2 mg 1X  ONCE IV  Last administered on 6/7/20at 15:28;  

Start 6/7/20 at 15:00;  Stop 6/7/20 at 15:01;  Status DC


Fentanyl Citrate (Fentanyl 2ml Vial) 100 mcg 1X  ONCE IV  Last administered on 

6/7/20at 15:28;  Start 6/7/20 at 15:00;  Stop 6/7/20 at 15:01;  Status DC


Lidocaine HCl (Lidocaine 1% 20ml Vial) 20 ml 1X  ONCE INJ  Last administered on 

6/7/20at 15:30;  Start 6/7/20 at 15:00;  Stop 6/7/20 at 15:01;  Status DC


Sodium Chloride 1,000 ml @  100 mls/hr Q10H IV  Last administered on 6/16/20at 

07:30;  Start 6/7/20 at 20:00;  Stop 6/16/20 at 11:26;  Status DC


Sodium Bicarbonate (Sodium Bicarb Adult 8.4% Syr) 50 meq 1X  ONCE IV  Last 

administered on 6/7/20at 21:47;  Start 6/7/20 at 22:00;  Stop 6/7/20 at 22:01;  

Status DC


Potassium Acetate 40 meq/Magnesium Sulfate 5 meq/ Multivitamins 10 ml/Chromium/ 

Copper/Manganese/ Seleni/Zn 1 ml/ Insulin Human Regular 30 unit/ Total 

Parenteral Nutrition/Amino Acids/Dextrose/ Fat Emulsion Intravenous 1,920 ml @  

80 mls/hr TPN  CONT IV  Last administered on 6/8/20at 22:28;  Start 6/8/20 at 

22:00;  Stop 6/9/20 at 21:59;  Status DC


Sodium Chloride 500 ml @  500 mls/hr 1X  ONCE IV  Last administered on 6/9/20at 

06:39;  Start 6/9/20 at 06:45;  Stop 6/9/20 at 07:44;  Status DC


Potassium Acetate 40 meq/Magnesium Sulfate 5 meq/ Multivitamins 10 ml/Chromium/ 

Copper/Manganese/ Seleni/Zn 1 ml/ Insulin Human Regular 30 unit/ Total 

Parenteral Nutrition/Amino Acids/Dextrose/ Fat Emulsion Intravenous 1,920 ml @  

80 mls/hr TPN  CONT IV  Last administered on 6/9/20at 22:03;  Start 6/9/20 at 

22:00;  Stop 6/10/20 at 21:59;  Status DC


Metoprolol Tartrate (Lopressor Vial) 5 mg PRN Q6HRS  PRN IVP HYPERTENSION Last 

administered on 7/31/20at 11:57;  Start 6/10/20 at 09:00


Potassium Acetate 40 meq/Magnesium Sulfate 5 meq/ Multivitamins 10 ml/Chromium/ 

Copper/Manganese/ Seleni/Zn 1 ml/ Insulin Human Regular 30 unit/ Total 

Parenteral Nutrition/Amino Acids/Dextrose/ Fat Emulsion Intravenous 1,920 ml @  

80 mls/hr TPN  CONT IV  Last administered on 6/10/20at 21:26;  Start 6/10/20 at 

22:00;  Stop 6/11/20 at 21:59;  Status DC


Potassium Acetate 40 meq/Magnesium Sulfate 5 meq/ Multivitamins 10 ml/Chromium/ 

Copper/Manganese/ Seleni/Zn 1 ml/ Insulin Human Regular 30 unit/ Total 

Parenteral Nutrition/Amino Acids/Dextrose/ Fat Emulsion Intravenous 1,920 ml @  

80 mls/hr TPN  CONT IV  Last administered on 6/11/20at 23:23;  Start 6/11/20 at 

22:00;  Stop 6/12/20 at 21:59;  Status DC


Potassium Acetate 40 meq/Magnesium Sulfate 5 meq/ Multivitamins 10 ml/Chromium/ 

Copper/Manganese/ Seleni/Zn 1 ml/ Insulin Human Regular 30 unit/ Total Paren

teral Nutrition/Amino Acids/Dextrose/ Fat Emulsion Intravenous 1,920 ml @  80 

mls/hr TPN  CONT IV  Last administered on 6/12/20at 21:35;  Start 6/12/20 at 

22:00;  Stop 6/13/20 at 21:59;  Status DC


Furosemide (Lasix) 20 mg 1X  ONCE IVP  Last administered on 6/13/20at 06:26;  

Start 6/13/20 at 06:15;  Stop 6/13/20 at 06:16;  Status DC


Methylprednisolone Sodium Succinate (SOLU-Medrol 125MG VIAL) 125 mg 1X  ONCE IV 

Last administered on 6/13/20at 06:26;  Start 6/13/20 at 06:15;  Stop 6/13/20 at 

06:16;  Status DC


Albuterol/ Ipratropium (Duoneb) 3 ml Q4HRS NEB  Last administered on 8/3/20at 

08:21;  Start 6/13/20 at 08:00


Fentanyl Citrate 30 ml @ 0 mls/hr CONT  PRN IV SEE PROTOCOL Last administered on

7/4/20at 08:03;  Start 6/13/20 at 06:00;  Stop 7/4/20 at 12:42;  Status DC


Propofol 100 ml @ 0 mls/hr CONT  PRN IV SEE PROTOCOL Last administered on 

6/20/20at 23:50;  Start 6/13/20 at 06:00


Fentanyl Citrate (Fentanyl 2ml Vial) 25 mcg PRN Q1HR  PRN IV SEE COMMENTS Last 

administered on 8/1/20at 23:56;  Start 6/13/20 at 06:00


Fentanyl Citrate (Fentanyl 2ml Vial) 50 mcg PRN Q1HR  PRN IV SEE COMMENTS Last 

administered on 8/3/20at 08:24;  Start 6/13/20 at 06:00


Chlorhexidine Gluconate (Peridex) 15 ml BID MM ;  Start 6/13/20 at 09:00;  Stop 

6/13/20 at 07:58;  Status DC


Potassium Acetate 40 meq/Magnesium Sulfate 5 meq/ Multivitamins 10 ml/Chromium/ 

Copper/Manganese/ Seleni/Zn 1 ml/ Insulin Human Regular 30 unit/ Total 

Parenteral Nutrition/Amino Acids/Dextrose/ Fat Emulsion Intravenous 1,920 ml @  

80 mls/hr TPN  CONT IV  Last administered on 6/13/20at 21:19;  Start 6/13/20 at 

22:00;  Stop 6/14/20 at 21:59;  Status DC


Acetylcysteine (Mucomyst 20% Resp Treatment) 600 mg BID NEB  Last administered 

on 6/19/20at 09:33;  Start 6/13/20 at 21:00;  Stop 6/19/20 at 10:39;  Status DC


Magnesium Sulfate 100 ml @  25 mls/hr 1X  ONCE IV  Last administered on 

6/13/20at 15:48;  Start 6/13/20 at 15:45;  Stop 6/13/20 at 19:44;  Status DC


Potassium Acetate 40 meq/Magnesium Sulfate 5 meq/ Multivitamins 10 ml/Chromium/ 

Copper/Manganese/ Seleni/Zn 1 ml/ Insulin Human Regular 30 unit/ Total 

Parenteral Nutrition/Amino Acids/Dextrose/ Fat Emulsion Intravenous 1,920 ml @  

80 mls/hr TPN  CONT IV  Last administered on 6/14/20at 21:35;  Start 6/14/20 at 

22:00;  Stop 6/15/20 at 21:59;  Status DC


Potassium Chloride/Water 100 ml @  100 mls/hr Q1H IV  Last administered on 

6/15/20at 08:31;  Start 6/15/20 at 07:00;  Stop 6/15/20 at 08:59;  Status DC


Potassium Acetate 40 meq/Magnesium Sulfate 5 meq/ Multivitamins 10 ml/Chromium/ 

Copper/Manganese/ Seleni/Zn 1 ml/ Insulin Human Regular 30 unit/ Total 

Parenteral Nutrition/Amino Acids/Dextrose/ Fat Emulsion Intravenous 1,920 ml @  

80 mls/hr TPN  CONT IV  Last administered on 6/15/20at 21:54;  Start 6/15/20 at 

22:00;  Stop 6/16/20 at 19:34;  Status DC


Lidocaine HCl (Buffered Lidocaine 1%) 3 ml STK-MED ONCE .ROUTE ;  Start 6/15/20 

at 12:14;  Stop 6/15/20 at 12:14;  Status DC


Lidocaine HCl (Buffered Lidocaine 1%) 3 ml 1X  ONCE IJ  Last administered on 

6/15/20at 13:11;  Start 6/15/20 at 13:00;  Stop 6/15/20 at 13:01;  Status DC


Magnesium Sulfate 50 ml @ 25 mls/hr 1X  ONCE IV ;  Start 6/16/20 at 08:15;  Stop

6/16/20 at 10:14;  Status DC


Potassium Acetate 40 meq/Magnesium Sulfate 10 meq/ Multivitamins 10 ml/Chromium/

Copper/Manganese/ Seleni/Zn 1 ml/ Insulin Human Regular 20 unit/ Total 

Parenteral Nutrition/Amino Acids/Dextrose/ Fat Emulsion Intravenous 1,920 ml @  

80 mls/hr TPN  CONT IV  Last administered on 6/16/20at 21:32;  Start 6/16/20 at 

22:00;  Stop 6/17/20 at 21:59;  Status DC


Potassium Chloride/Water 100 ml @  100 mls/hr Q1H IV  Last administered on 

6/17/20at 09:12;  Start 6/17/20 at 08:00;  Stop 6/17/20 at 09:59;  Status DC


Alteplase, Recombinant (Cathflo For Central Catheter Clearance) 4 mg 1X  ONCE 

INT CAT ;  Start 6/17/20 at 09:15;  Stop 6/17/20 at 09:16;  Status UNV


Alteplase, Recombinant (Cathflo For Central Catheter Clearance) 4 mg 1X  ONCE 

INT CAT ;  Start 6/17/20 at 09:15;  Stop 6/17/20 at 09:16;  Status UNV


Alteplase, Recombinant (Cathflo For Central Catheter Clearance) 4 mg 1X  ONCE 

INT CAT ;  Start 6/17/20 at 09:15;  Stop 6/17/20 at 09:16;  Status UNV


Alteplase, Recombinant 4 mg/ Sodium Chloride 20 ml @ 20 mls/hr 1X  ONCE IV  Last

administered on 6/17/20at 10:10;  Start 6/17/20 at 10:00;  Stop 6/17/20 at 

10:59;  Status DC


Alteplase, Recombinant 4 mg/ Sodium Chloride 20 ml @ 20 mls/hr 1X  ONCE IV  Last

administered on 6/17/20at 10:09;  Start 6/17/20 at 10:00;  Stop 6/17/20 at 

10:59;  Status DC


Alteplase, Recombinant 4 mg/ Sodium Chloride 20 ml @ 20 mls/hr 1X  ONCE IV  Last

administered on 6/17/20at 10:09;  Start 6/17/20 at 10:00;  Stop 6/17/20 at 

10:59;  Status DC


Potassium Acetate 60 meq/Magnesium Sulfate 10 meq/ Multivitamins 10 ml/Chromium/

Copper/Manganese/ Seleni/Zn 1 ml/ Insulin Human Regular 20 unit/ Total 

Parenteral Nutrition/Amino Acids/Dextrose/ Fat Emulsion Intravenous 1,920 ml @  

80 mls/hr TPN  CONT IV  Last administered on 6/17/20at 21:55;  Start 6/17/20 at 

22:00;  Stop 6/18/20 at 21:59;  Status DC


Albumin Human 500 ml @  125 mls/hr 1X  ONCE IV  Last administered on 6/18/20at 

12:01;  Start 6/18/20 at 11:15;  Stop 6/18/20 at 15:14;  Status DC


Sodium Chloride 500 ml @  500 mls/hr 1X  ONCE IV  Last administered on 6/18/20at

13:50;  Start 6/18/20 at 11:15;  Stop 6/18/20 at 12:14;  Status DC


Potassium Acetate 60 meq/Magnesium Sulfate 14 meq/ Multivitamins 10 ml/Chromium/

Copper/Manganese/ Seleni/Zn 1 ml/ Insulin Human Regular 20 unit/ Total 

Parenteral Nutrition/Amino Acids/Dextrose/ Fat Emulsion Intravenous 1,920 ml @  

80 mls/hr TPN  CONT IV  Last administered on 6/18/20at 22:26;  Start 6/18/20 at 

22:00;  Stop 6/19/20 at 21:59;  Status DC


Ciprofloxacin/ Dextrose 200 ml @  200 mls/hr Q12HR IV  Last administered on 

6/25/20at 08:27;  Start 6/18/20 at 21:00;  Stop 6/25/20 at 08:56;  Status DC


Albumin Human 250 ml @  62.5 mls/hr 1X  ONCE IV  Last administered on 6/19/20at 

11:09;  Start 6/19/20 at 11:00;  Stop 6/19/20 at 14:59;  Status DC


Furosemide (Lasix) 20 mg 1X  ONCE IVP  Last administered on 6/19/20at 14:52;  

Start 6/19/20 at 10:45;  Stop 6/19/20 at 10:49;  Status DC


Potassium Acetate 60 meq/Magnesium Sulfate 14 meq/ Multivitamins 10 ml/Chromium/

Copper/Manganese/ Seleni/Zn 1 ml/ Insulin Human Regular 15 unit/ Total 

Parenteral Nutrition/Amino Acids/Dextrose/ Fat Emulsion Intravenous 1,920 ml @  

80 mls/hr TPN  CONT IV  Last administered on 6/19/20at 22:08;  Start 6/19/20 at 

22:00;  Stop 6/20/20 at 21:59;  Status DC


Potassium Acetate 60 meq/Magnesium Sulfate 14 meq/ Multivitamins 10 ml/Chromium/

Copper/Manganese/ Seleni/Zn 1 ml/ Insulin Human Regular 15 unit/ Total 

Parenteral Nutrition/Amino Acids/Dextrose/ Fat Emulsion Intravenous 1,920 ml @  

80 mls/hr TPN  CONT IV  Last administered on 6/20/20at 22:12;  Start 6/20/20 at 

22:00;  Stop 6/21/20 at 21:59;  Status DC


Potassium Acetate 60 meq/Magnesium Sulfate 14 meq/ Multivitamins 10 ml/Chromium/

Copper/Manganese/ Seleni/Zn 1 ml/ Insulin Human Regular 15 unit/ Total 

Parenteral Nutrition/Amino Acids/Dextrose/ Fat Emulsion Intravenous 1,920 ml @  

80 mls/hr TPN  CONT IV  Last administered on 6/21/20at 22:22;  Start 6/21/20 at 

22:00;  Stop 6/22/20 at 21:59;  Status DC


Furosemide (Lasix) 20 mg 1X  ONCE IVP  Last administered on 6/22/20at 11:07;  

Start 6/22/20 at 10:30;  Stop 6/22/20 at 10:34;  Status DC


Potassium Acetate 60 meq/Magnesium Sulfate 14 meq/ Multivitamins 10 ml/Chromium/

Copper/Manganese/ Seleni/Zn 1 ml/ Insulin Human Regular 15 unit/ Sodium Chloride

20 meq/Total Parenteral Nutrition/Amino Acids/Dextrose/ Fat Emulsion Intravenous

1,920 ml @  80 mls/hr TPN  CONT IV  Last administered on 6/22/20at 21:54;  Start

6/22/20 at 22:00;  Stop 6/23/20 at 21:59;  Status DC


Potassium Acetate 30 meq/Magnesium Sulfate 14 meq/ Multivitamins 10 ml/Chromium/

Copper/Manganese/ Seleni/Zn 1 ml/ Insulin Human Regular 15 unit/ Sodium Chloride

20 meq/Potassium Chloride 30 meq/ Total Parenteral Nutrition/Amino 

Acids/Dextrose/ Fat Emulsion Intravenous 1,920 ml @  80 mls/hr TPN  CONT IV  

Last administered on 6/23/20at 21:46;  Start 6/23/20 at 22:00;  Stop 6/24/20 at 

21:59;  Status DC


Sodium Chloride 80 meq/Potassium Chloride 30 meq/ Potassium Acetate 30 

meq/Magnesium Sulfate 14 meq/ Multivitamins 10 ml/Chromium/ Copper/Manganese/ 

Seleni/Zn 1 ml/ Insulin Human Regular 15 unit/ Total Parenteral Nutrition/Amino 

Acids/Dextrose/ Fat Emulsion Intravenous 1,920 ml @  80 mls/hr TPN  CONT IV  

Last administered on 6/24/20at 22:33;  Start 6/24/20 at 22:00;  Stop 6/25/20 at 

21:59;  Status DC


Furosemide (Lasix) 40 mg 1X  ONCE IVP  Last administered on 6/24/20at 16:27;  

Start 6/24/20 at 15:30;  Stop 6/24/20 at 15:33;  Status DC


Albumin Human 250 ml @  62.5 mls/hr 1X  ONCE IV  Last administered on 6/24/20at 

16:27;  Start 6/24/20 at 15:30;  Stop 6/24/20 at 19:29;  Status DC


Sodium Chloride 80 meq/Potassium Chloride 30 meq/ Potassium Acetate 30 

meq/Magnesium Sulfate 14 meq/ Multivitamins 10 ml/Chromium/ Copper/Manganese/ 

Seleni/Zn 1 ml/ Insulin Human Regular 15 unit/ Total Parenteral Nutrition/Amino 

Acids/Dextrose/ Fat Emulsion Intravenous 1,920 ml @  80 mls/hr TPN  CONT IV  

Last administered on 6/25/20at 22:25;  Start 6/25/20 at 22:00;  Stop 6/26/20 at 

21:59;  Status DC


Sodium Chloride 80 meq/Potassium Chloride 30 meq/ Potassium Acetate 30 

meq/Magnesium Sulfate 14 meq/ Multivitamins 10 ml/Chromium/ Copper/Manganese/ 

Seleni/Zn 1 ml/ Insulin Human Regular 15 unit/ Total Parenteral Nutrition/Amino 

Acids/Dextrose/ Fat Emulsion Intravenous 1,920 ml @  80 mls/hr TPN  CONT IV  

Last administered on 6/26/20at 21:32;  Start 6/26/20 at 22:00;  Stop 6/27/20 at 

21:59;  Status DC


Sodium Chloride 80 meq/Potassium Chloride 30 meq/ Potassium Acetate 30 

meq/Magnesium Sulfate 14 meq/ Multivitamins 10 ml/Chromium/ Copper/Manganese/ 

Seleni/Zn 1 ml/ Insulin Human Regular 15 unit/ Total Parenteral Nutrition/Amino 

Acids/Dextrose/ Fat Emulsion Intravenous 1,920 ml @  80 mls/hr TPN  CONT IV  

Last administered on 6/27/20at 21:53;  Start 6/27/20 at 22:00;  Stop 6/28/20 at 

21:59;  Status DC


Acetylcysteine (Mucomyst 20% Resp Treatment) 600 mg RTBID NEB  Last administered

on 8/3/20at 08:21;  Start 6/27/20 at 12:00


Sodium Chloride 80 meq/Potassium Chloride 30 meq/ Potassium Acetate 30 

meq/Magnesium Sulfate 14 meq/ Multivitamins 10 ml/Chromium/ Copper/Manganese/ 

Seleni/Zn 1 ml/ Insulin Human Regular 15 unit/ Total Parenteral Nutrition/Amino 

Acids/Dextrose/ Fat Emulsion Intravenous 1,920 ml @  80 mls/hr TPN  CONT IV  

Last administered on 6/28/20at 22:06;  Start 6/28/20 at 22:00;  Stop 6/29/20 at 

21:59;  Status DC


Meropenem 500 mg/ Sodium Chloride 50 ml @  100 mls/hr Q6HRS IV  Last 

administered on 7/21/20at 06:15;  Start 6/28/20 at 18:00;  Stop 7/21/20 at 

08:23;  Status DC


Daptomycin 500 mg/ Sodium Chloride 50 ml @  100 mls/hr Q24H IV  Last 

administered on 7/6/20at 21:47;  Start 6/28/20 at 19:00;  Stop 7/7/20 at 08:13; 

Status DC


Sodium Chloride 80 meq/Potassium Chloride 30 meq/ Potassium Acetate 30 

meq/Magnesium Sulfate 14 meq/ Multivitamins 10 ml/Chromium/ Copper/Manganese/ 

Seleni/Zn 1 ml/ Insulin Human Regular 15 unit/ Total Parenteral Nutrition/Amino 

Acids/Dextrose/ Fat Emulsion Intravenous 1,920 ml @  80 mls/hr TPN  CONT IV  

Last administered on 6/29/20at 22:09;  Start 6/29/20 at 22:00;  Stop 6/30/20 at 

21:59;  Status DC


Heparin Sodium (Porcine) 1000 unit/Sodium Chloride 1,001 ml @  1,001 mls/hr 1X  

ONCE IRR ;  Start 6/30/20 at 06:00;  Stop 6/30/20 at 06:59;  Status DC


Propofol (Diprivan) 200 mg STK-MED ONCE IV ;  Start 6/30/20 at 07:44;  Stop 

6/30/20 at 07:44;  Status DC


Lidocaine HCl (Lidocaine Pf 2% Vial) 5 ml STK-MED ONCE .ROUTE ;  Start 6/30/20 

at 07:44;  Stop 6/30/20 at 07:44;  Status DC


Fentanyl Citrate (Fentanyl 2ml Vial) 100 mcg STK-MED ONCE .ROUTE ;  Start 

6/30/20 at 07:44;  Stop 6/30/20 at 07:44;  Status DC


Rocuronium Bromide (Zemuron) 100 mg STK-MED ONCE .ROUTE ;  Start 6/30/20 at 

07:44;  Stop 6/30/20 at 07:44;  Status DC


Micafungin Sodium 100 mg/Dextrose 100 ml @  100 mls/hr Q24H IV  Last 

administered on 8/3/20at 08:25;  Start 6/30/20 at 08:30


Bupivacaine HCl/ Epinephrine Bitart (Sensorcain-Epi 0.5%-1:465914 Mpf) 30 ml 

STK-MED ONCE .ROUTE ;  Start 6/30/20 at 08:34;  Stop 6/30/20 at 08:35;  Status 

DC


Iohexol (Omnipaque 300 Mg/ml) 50 ml STK-MED ONCE .ROUTE  Last administered on 

6/30/20at 13:30;  Start 6/30/20 at 08:35;  Stop 6/30/20 at 08:35;  Status DC


Sodium Chloride 80 meq/Potassium Chloride 30 meq/ Potassium Acetate 30 

meq/Magnesium Sulfate 14 meq/ Multivitamins 10 ml/Chromium/ Copper/Manganese/ 

Seleni/Zn 1 ml/ Insulin Human Regular 15 unit/ Total Parenteral Nutrition/Amino 

Acids/Dextrose/ Fat Emulsion Intravenous 1,920 ml @  80 mls/hr TPN  CONT IV  

Last administered on 7/1/20at 01:22;  Start 6/30/20 at 22:00;  Stop 7/1/20 at 

21:59;  Status DC


Phenylephrine HCl (Ken-Synephrine Inj) 10 mg STK-MED ONCE .ROUTE ;  Start 

6/30/20 at 10:15;  Stop 6/30/20 at 10:15;  Status DC


Desflurane (Suprane) 90 ml STK-MED ONCE IH ;  Start 6/30/20 at 10:18;  Stop 

6/30/20 at 10:19;  Status DC


Albumin Human 500 ml @  As Directed STK-MED ONCE IV ;  Start 6/30/20 at 11:06;  

Stop 6/30/20 at 11:06;  Status DC


Vasopressin (Vasostrict) 20 unit STK-MED ONCE .ROUTE ;  Start 6/30/20 at 12:23; 

Stop 6/30/20 at 12:23;  Status DC


Phenylephrine HCl (Ken-Synephrine Inj) 10 mg STK-MED ONCE .ROUTE ;  Start 

6/30/20 at 13:33;  Stop 6/30/20 at 13:33;  Status DC


Phenylephrine HCl (Ken-Synephrine Inj) 10 mg STK-MED ONCE .ROUTE ;  Start 

6/30/20 at 13:33;  Stop 6/30/20 at 13:33;  Status DC


Ondansetron HCl (Zofran) 4 mg STK-MED ONCE .ROUTE ;  Start 6/30/20 at 13:33;  

Stop 6/30/20 at 13:33;  Status DC


Enoxaparin Sodium (Lovenox 40mg Syringe) 40 mg Q24H SQ  Last administered on 

8/3/20at 08:25;  Start 7/1/20 at 08:00


Sodium Chloride (Normal Saline Flush) 3 ml QSHIFT  PRN IV AFTER MEDS AND BLOOD 

DRAWS;  Start 6/30/20 at 14:45


Naloxone HCl (Narcan) 0.4 mg PRN Q2MIN  PRN IV SEE INSTRUCTIONS;  Start 6/30/20 

at 14:45


Sodium Chloride 1,000 ml @  25 mls/hr Q24H IV  Last administered on 8/2/20at 

20:55;  Start 6/30/20 at 14:33


Morphine Sulfate (Morphine Sulfate) 1 mg PRN Q1HR  PRN IV PAIN Last administered

on 7/25/20at 18:28;  Start 6/30/20 at 14:45


Midazolam HCl 100 mg/Sodium Chloride 100 ml @ 1 mls/hr CONT  PRN IV SEE I/O 

RECORD Last administered on 7/3/20at 18:48;  Start 6/30/20 at 14:45


Phenylephrine HCl (PHENYLEPHRINE in 0.9% NACL PF) 1 mg STK-MED ONCE IV ;  Start 

6/30/20 at 14:44;  Stop 6/30/20 at 14:45;  Status DC


Ephedrine Sulfate (ePHEDrine PF IN SALINE SYRINGE) 50 mg STK-MED ONCE IV ;  

Start 6/30/20 at 14:45;  Stop 6/30/20 at 14:45;  Status DC


Vasopressin 20 unit/Dextrose 101 ml @  12 mls/hr CONT  PRN IV SEE I/O RECORD 

Last administered on 7/7/20at 04:17;  Start 6/30/20 at 15:30


Sodium Chloride 1,000 ml @  1,000 mls/hr 1X  ONCE IV  Last administered on 

6/30/20at 15:42;  Start 6/30/20 at 15:45;  Stop 6/30/20 at 16:44;  Status DC


Albumin Human 500 ml @  125 mls/hr 1X  ONCE IV ;  Start 6/30/20 at 16:00;  Stop 

6/30/20 at 19:59;  Status DC


Albumin Human 500 ml @  125 mls/hr PRN Q1HR  PRN IV PER PROTOCOL;  Start 6/30/20

at 15:45


Magnesium Sulfate 50 ml @ 25 mls/hr 1X  ONCE IV  Last administered on 6/30/20at 

17:02;  Start 6/30/20 at 16:30;  Stop 6/30/20 at 18:29;  Status DC


Sodium Bicarbonate (Sodium Bicarb Adult 8.4% Syr) 50 meq STK-MED ONCE .ROUTE ;  

Start 6/30/20 at 16:20;  Stop 6/30/20 at 16:20;  Status DC


Sodium Bicarbonate (Sodium Bicarb Adult 8.4% Syr) 100 meq 1X  ONCE IV  Last 

administered on 6/30/20at 17:07;  Start 6/30/20 at 16:30;  Stop 6/30/20 at 

16:31;  Status DC


Sodium Bicarbonate 150 meq/Dextrose 1,150 ml @  75 mls/hr 1X  ONCE IV  Last 

administered on 6/30/20at 20:02;  Start 6/30/20 at 16:30;  Stop 7/1/20 at 07:49;

 Status DC


Sodium Chloride 80 meq/Potassium Chloride 30 meq/ Potassium Acetate 30 

meq/Magnesium Sulfate 14 meq/ Multivitamins 10 ml/Chromium/ Copper/Manganese/ Se

aram/Zn 1 ml/ Insulin Human Regular 15 unit/ Total Parenteral Nutrition/Amino 

Acids/Dextrose/ Fat Emulsion Intravenous 1,920 ml @  80 mls/hr TPN  CONT IV  

Last administered on 7/1/20at 23:05;  Start 7/1/20 at 22:00;  Stop 7/2/20 at 

21:59;  Status DC


Sodium Chloride 100 meq/Potassium Chloride 30 meq/ Potassium Acetate 30 

meq/Magnesium Sulfate 12 meq/ Multivitamins 10 ml/Chromium/ Copper/Manganese/ 

Seleni/Zn 1 ml/ Insulin Human Regular 15 unit/ Total Parenteral Nutrition/Amino 

Acids/Dextrose/ Fat Emulsion Intravenous 1,920 ml @  80 mls/hr TPN  CONT IV  

Last administered on 7/2/20at 21:52;  Start 7/2/20 at 22:00;  Stop 7/3/20 at 

21:59;  Status DC


Sodium Chloride 100 meq/Potassium Chloride 30 meq/ Potassium Acetate 30 

meq/Magnesium Sulfate 12 meq/ Multivitamins 10 ml/Chromium/ Copper/Manganese/ 

Seleni/Zn 1 ml/ Insulin Human Regular 15 unit/ Total Parenteral Nutrition/Amino 

Acids/Dextrose/ Fat Emulsion Intravenous 1,920 ml @  80 mls/hr TPN  CONT IV  

Last administered on 7/3/20at 21:46;  Start 7/3/20 at 22:00;  Stop 7/4/20 at 

21:59;  Status DC


Sodium Chloride 100 meq/Potassium Chloride 30 meq/ Potassium Acetate 30 

meq/Magnesium Sulfate 12 meq/ Multivitamins 10 ml/Chromium/ Copper/Manganese/ 

Seleni/Zn 1 ml/ Insulin Human Regular 15 unit/ Total Parenteral Nutrition/Amino 

Acids/Dextrose/ Fat Emulsion Intravenous 1,800 ml @  75 mls/hr TPN  CONT IV  

Last administered on 7/4/20at 22:04;  Start 7/4/20 at 22:00;  Stop 7/5/20 at 

21:59;  Status DC


Fentanyl Citrate 55 ml @ 0 mls/hr CONT  PRN IV SEE COMMENTS Last administered on

7/6/20at 23:55;  Start 7/4/20 at 13:00;  Stop 7/9/20 at 17:28;  Status DC


Sodium Chloride 100 meq/Potassium Chloride 30 meq/ Potassium Acetate 30 

meq/Magnesium Sulfate 12 meq/ Multivitamins 10 ml/Chromium/ Copper/Manganese/ 

Seleni/Zn 1 ml/ Insulin Human Regular 15 unit/ Total Parenteral Nutrition/Amino 

Acids/Dextrose/ Fat Emulsion Intravenous 1,680 ml @  70 mls/hr TPN  CONT IV  La

st administered on 7/5/20at 21:23;  Start 7/5/20 at 22:00;  Stop 7/6/20 at 

21:59;  Status DC


Sodium Chloride 110 meq/Potassium Chloride 30 meq/ Potassium Acetate 30 

meq/Magnesium Sulfate 15 meq/ Multivitamins 10 ml/Chromium/ Copper/Manganese/ 

Seleni/Zn 1 ml/ Insulin Human Regular 15 unit/ Total Parenteral Nutrition/Amino 

Acids/Dextrose/ Fat Emulsion Intravenous 1,680 ml @  70 mls/hr TPN  CONT IV  

Last administered on 7/6/20at 21:48;  Start 7/6/20 at 22:00;  Stop 7/7/20 at 

21:59;  Status DC


Sodium Chloride 110 meq/Potassium Chloride 30 meq/ Potassium Acetate 30 

meq/Magnesium Sulfate 15 meq/ Multivitamins 10 ml/Chromium/ Copper/Manganese/ 

Seleni/Zn 1 ml/ Insulin Human Regular 15 unit/ Total Parenteral Nutrition/Amino 

Acids/Dextrose/ Fat Emulsion Intravenous 1,680 ml @  70 mls/hr TPN  CONT IV  

Last administered on 7/7/20at 21:33;  Start 7/7/20 at 22:00;  Stop 7/8/20 at 

21:59;  Status DC


Sodium Chloride 110 meq/Potassium Chloride 30 meq/ Potassium Acetate 30 

meq/Magnesium Sulfate 15 meq/ Multivitamins 10 ml/Chromium/ Copper/Manganese/ 

Seleni/Zn 1 ml/ Insulin Human Regular 15 unit/ Total Parenteral Nutrition/Amino 

Acids/Dextrose/ Fat Emulsion Intravenous 1,680 ml @  70 mls/hr TPN  CONT IV  

Last administered on 7/8/20at 21:51;  Start 7/8/20 at 22:00;  Stop 7/9/20 at 

21:59;  Status DC


Sodium Chloride 90 meq/Potassium Chloride 30 meq/ Potassium Acetate 30 

meq/Magnesium Sulfate 15 meq/ Multivitamins 10 ml/Chromium/ Copper/Manganese/ 

Seleni/Zn 1 ml/ Insulin Human Regular 15 unit/ Total Parenteral Nutrition/Amino 

Acids/Dextrose/ Fat Emulsion Intravenous 1,680 ml @  70 mls/hr TPN  CONT IV  

Last administered on 7/9/20at 22:38;  Start 7/9/20 at 22:00;  Stop 7/10/20 at 

21:59;  Status DC


Fentanyl Citrate 30 ml @ 0 mls/hr CONT  PRN IV SEE I/O RECORD;  Start 7/9/20 at 

17:30


Fentanyl (Duragesic 12mcg/ Hr Patch) 1 patch Q3DAYS TD  Last administered on 

8/3/20at 08:25;  Start 7/10/20 at 09:00


Sodium Chloride 90 meq/Potassium Chloride 30 meq/ Potassium Acetate 30 

meq/Magnesium Sulfate 15 meq/ Multivitamins 10 ml/Chromium/ Copper/Manganese/ 

Seleni/Zn 1 ml/ Insulin Human Regular 15 unit/ Total Parenteral Nutrition/Amino 

Acids/Dextrose/ Fat Emulsion Intravenous 1,680 ml @  70 mls/hr TPN  CONT IV  

Last administered on 7/10/20at 21:59;  Start 7/10/20 at 22:00;  Stop 7/11/20 at 

21:59;  Status DC


Sodium Chloride 90 meq/Potassium Chloride 30 meq/ Potassium Acetate 30 

meq/Magnesium Sulfate 15 meq/ Multivitamins 10 ml/Chromium/ Copper/Manganese/ 

Seleni/Zn 1 ml/ Insulin Human Regular 15 unit/ Total Parenteral Nutrition/Amino 

Acids/Dextrose/ Fat Emulsion Intravenous 1,680 ml @  70 mls/hr TPN  CONT IV  

Last administered on 7/11/20at 21:35;  Start 7/11/20 at 22:00;  Stop 7/12/20 at 

21:59;  Status DC


Vancomycin HCl (Vanco Per Pharmacy) 1 each PRN DAILY  PRN MC SEE COMMENTS Last 

administered on 7/14/20at 02:46;  Start 7/12/20 at 09:15;  Stop 7/15/20 at 

07:41;  Status DC


Ciprofloxacin/ Dextrose 200 ml @  200 mls/hr Q12HR IV  Last administered on 

7/20/20at 21:02;  Start 7/12/20 at 10:00;  Stop 7/21/20 at 08:20;  Status DC


Vancomycin HCl 2 gm/Sodium Chloride 500 ml @  250 mls/hr 1X  ONCE IV  Last 

administered on 7/12/20at 10:34;  Start 7/12/20 at 10:00;  Stop 7/12/20 at 

11:59;  Status DC


Sodium Chloride 90 meq/Potassium Chloride 30 meq/ Potassium Acetate 30 

meq/Magnesium Sulfate 15 meq/ Multivitamins 10 ml/Chromium/ Copper/Manganese/ 

Seleni/Zn 1 ml/ Insulin Human Regular 15 unit/ Total Parenteral Nutrition/Amino 

Acids/Dextrose/ Fat Emulsion Intravenous 1,680 ml @  70 mls/hr TPN  CONT IV  

Last administered on 7/12/20at 22:02;  Start 7/12/20 at 22:00;  Stop 7/13/20 at 

21:59;  Status DC


Diphenhydramine HCl (Benadryl) 25 mg 1X  ONCE IVP  Last administered on 

7/12/20at 14:26;  Start 7/12/20 at 14:30;  Stop 7/12/20 at 14:31;  Status DC


Vancomycin HCl 1.5 gm/Sodium Chloride 500 ml @  250 mls/hr Q8H IV  Last 

administered on 7/13/20at 03:08;  Start 7/12/20 at 18:30;  Stop 7/13/20 at 

12:24;  Status DC


Vancomycin HCl (Vancomycin Trough Level) 1 each 1X  ONCE MC  Last administered 

on 7/13/20at 10:00;  Start 7/13/20 at 10:00;  Stop 7/13/20 at 10:01;  Status DC


Sodium Chloride 90 meq/Potassium Chloride 30 meq/ Potassium Acetate 30 

meq/Magnesium Sulfate 15 meq/ Multivitamins 10 ml/Chromium/ Copper/Manganese/ 

Seleni/Zn 1 ml/ Insulin Human Regular 15 unit/ Total Parenteral Nutrition/Amino 

Acids/Dextrose/ Fat Emulsion Intravenous 1,680 ml @  70 mls/hr TPN  CONT IV  

Last administered on 7/13/20at 22:13;  Start 7/13/20 at 22:00;  Stop 7/14/20 at 

21:59;  Status DC


Vancomycin HCl (Vancomycin Random Level) 1 each 1X  ONCE MC  Last administered 

on 7/14/20at 01:00;  Start 7/14/20 at 01:00;  Stop 7/14/20 at 01:01;  Status DC


Vancomycin HCl 1.5 gm/Sodium Chloride 500 ml @  250 mls/hr Q12H IV  Last 

administered on 7/14/20at 22:07;  Start 7/14/20 at 10:00;  Stop 7/15/20 at 

07:41;  Status DC


Vancomycin HCl (Vancomycin Trough Level) 1 each 1X  ONCE MC ;  Start 7/15/20 at 

09:30;  Stop 7/15/20 at 09:31;  Status Cancel


Sodium Chloride 90 meq/Potassium Chloride 30 meq/ Potassium Acetate 30 

meq/Magnesium Sulfate 15 meq/ Multivitamins 10 ml/Chromium/ Copper/Manganese/ 

Seleni/Zn 1 ml/ Insulin Human Regular 15 unit/ Total Parenteral Nutrition/Amino 

Acids/Dextrose/ Fat Emulsion Intravenous 1,680 ml @  70 mls/hr TPN  CONT IV  

Last administered on 7/14/20at 22:08;  Start 7/14/20 at 22:00;  Stop 7/15/20 at 

21:59;  Status DC


Alteplase, Recombinant (Cathflo For Central Catheter Clearance) 1 mg 1X  ONCE 

INT CAT  Last administered on 7/14/20at 11:49;  Start 7/14/20 at 11:00;  Stop 

7/14/20 at 11:01;  Status DC


Daptomycin 500 mg/ Sodium Chloride 50 ml @  100 mls/hr Q24H IV  Last 

administered on 7/24/20at 08:25;  Start 7/15/20 at 09:00;  Stop 7/24/20 at 

08:38;  Status DC


Sodium Chloride 90 meq/Potassium Chloride 30 meq/ Potassium Acetate 30 m

eq/Magnesium Sulfate 15 meq/ Multivitamins 10 ml/Chromium/ Copper/Manganese/ 

Seleni/Zn 1 ml/ Insulin Human Regular 15 unit/ Total Parenteral Nutrition/Amino 

Acids/Dextrose/ Fat Emulsion Intravenous 1,680 ml @  70 mls/hr TPN  CONT IV  

Last administered on 7/15/20at 22:55;  Start 7/15/20 at 22:00;  Stop 7/16/20 at 

21:59;  Status DC


Sodium Chloride 90 meq/Potassium Chloride 30 meq/ Potassium Acetate 30 

meq/Magnesium Sulfate 15 meq/ Multivitamins 10 ml/Chromium/ Copper/Manganese/ 

Seleni/Zn 1 ml/ Insulin Human Regular 15 unit/ Total Parenteral Nutrition/Amino 

Acids/Dextrose/ Fat Emulsion Intravenous 1,680 ml @  70 mls/hr TPN  CONT IV  La

st administered on 7/16/20at 22:06;  Start 7/16/20 at 22:00;  Stop 7/17/20 at 

21:59;  Status DC


Diphenhydramine HCl (Benadryl) 50 mg STK-MED ONCE .ROUTE ;  Start 7/16/20 at 

18:34;  Stop 7/16/20 at 18:35;  Status DC


Diphenhydramine HCl (Benadryl) 25 mg 1X  ONCE IM ;  Start 7/16/20 at 18:45;  

Stop 7/16/20 at 18:46;  Status DC


Diphenhydramine HCl (Benadryl) 25 mg 1X  ONCE IVP  Last administered on 

7/16/20at 18:56;  Start 7/16/20 at 19:00;  Stop 7/16/20 at 19:01;  Status DC


Alprazolam (Xanax) 0.5 mg PRN TID  PRN PO ANXIETY / AGITATION Last administered 

on 7/23/20at 11:49;  Start 7/17/20 at 08:00;  Stop 7/23/20 at 15:54;  Status DC


Sodium Chloride 110 meq/Potassium Chloride 30 meq/ Potassium Acetate 30 

meq/Magnesium Sulfate 15 meq/ Multivitamins 10 ml/Chromium/ Copper/Manganese/ 

Seleni/Zn 1 ml/ Insulin Human Regular 15 unit/ Total Parenteral Nutrition/Amino 

Acids/Dextrose/ Fat Emulsion Intravenous 1,680 ml @  70 mls/hr TPN  CONT IV  

Last administered on 7/17/20at 21:21;  Start 7/17/20 at 22:00;  Stop 7/18/20 at 

21:59;  Status DC


Sodium Chloride 110 meq/Potassium Chloride 30 meq/ Potassium Acetate 30 

meq/Magnesium Sulfate 15 meq/ Multivitamins 10 ml/Chromium/ Copper/Manganese/ 

Seleni/Zn 1 ml/ Insulin Human Regular 15 unit/ Total Parenteral Nutrition/Amino 

Acids/Dextrose/ Fat Emulsion Intravenous 1,680 ml @  70 mls/hr TPN  CONT IV  

Last administered on 7/18/20at 22:01;  Start 7/18/20 at 22:00;  Stop 7/19/20 at 

21:59;  Status DC


Alteplase, Recombinant (Cathflo For Central Catheter Clearance) 1 mg 1X  ONCE 

INT CAT  Last administered on 7/19/20at 08:23;  Start 7/19/20 at 08:15;  Stop 

7/19/20 at 08:16;  Status DC


Sodium Chloride 110 meq/Sodium Phosphate 10 mmol/ Potassium Chloride 30 meq/ 

Potassium Acetate 30 meq/Magnesium Sulfate 15 meq/ Multivitamins 10 ml/Chromium/

Copper/Manganese/ Seleni/Zn 1 ml/ Insulin Human Regular 15 unit/ Total 

Parenteral Nutrition/Amino Acids/Dextrose/ Fat Emulsion Intravenous 1,680 ml @  

70 mls/hr TPN  CONT IV  Last administered on 7/19/20at 22:03;  Start 7/19/20 at 

22:00;  Stop 7/20/20 at 21:59;  Status DC


Sodium Chloride 120 meq/Sodium Phosphate 10 mmol/ Potassium Chloride 30 meq/ 

Potassium Acetate 30 meq/Magnesium Sulfate 15 meq/ Multivitamins 10 ml/Chromium/

Copper/Manganese/ Seleni/Zn 1 ml/ Insulin Human Regular 15 unit/ Total 

Parenteral Nutrition/Amino Acids/Dextrose/ Fat Emulsion Intravenous 1,680 ml @  

70 mls/hr TPN  CONT IV  Last administered on 7/20/20at 22:08;  Start 7/20/20 at 

22:00;  Stop 7/21/20 at 21:59;  Status DC


Ceftazidime/ Avibactam 2.5 gm/ Sodium Chloride 100 ml @  50 mls/hr Q8HRS IV  

Last administered on 7/22/20at 05:32;  Start 7/21/20 at 14:00;  Stop 7/22/20 at 

07:48;  Status DC


Alteplase, Recombinant (Cathflo For Central Catheter Clearance) 1 mg 1X  ONCE 

INT CAT  Last administered on 7/21/20at 09:30;  Start 7/21/20 at 09:30;  Stop 

7/21/20 at 09:31;  Status DC


Sodium Chloride 120 meq/Sodium Phosphate 10 mmol/ Potassium Chloride 30 meq/ 

Potassium Acetate 30 meq/Magnesium Sulfate 15 meq/ Multivitamins 10 ml/Chromium/

Copper/Manganese/ Seleni/Zn 1 ml/ Insulin Human Regular 15 unit/ Total 

Parenteral Nutrition/Amino Acids/Dextrose/ Fat Emulsion Intravenous 1,680 ml @  

70 mls/hr TPN  CONT IV  Last administered on 7/21/20at 22:11;  Start 7/21/20 at 

22:00;  Stop 7/22/20 at 21:59;  Status DC


Iohexol (Omnipaque 300 Mg/ml) 75 ml 1X  ONCE IV  Last administered on 7/21/20at 

11:30;  Start 7/21/20 at 11:30;  Stop 7/21/20 at 11:31;  Status DC


Info (CONTRAST GIVEN -- Rx MONITORING) 1 each PRN DAILY  PRN MC SEE COMMENTS;  

Start 7/21/20 at 11:45;  Stop 7/23/20 at 11:44;  Status DC


Alteplase, Recombinant (Cathflo For Central Catheter Clearance) 1 mg 1X  ONCE 

INT CAT  Last administered on 7/21/20at 12:17;  Start 7/21/20 at 12:00;  Stop 

7/21/20 at 12:01;  Status DC


Cefepime HCl (Maxipime) 2 gm Q12HR IVP  Last administered on 7/22/20at 20:53;  

Start 7/22/20 at 09:00;  Stop 7/23/20 at 07:30;  Status DC


Sodium Chloride 120 meq/Sodium Phosphate 10 mmol/ Potassium Chloride 30 meq/ 

Potassium Acetate 30 meq/Magnesium Sulfate 15 meq/ Multivitamins 10 ml/Chromium/

Copper/Manganese/ Seleni/Zn 1 ml/ Insulin Human Regular 15 unit/ Total 

Parenteral Nutrition/Amino Acids/Dextrose/ Fat Emulsion Intravenous 1,680 ml @  

70 mls/hr TPN  CONT IV  Last administered on 7/22/20at 21:25;  Start 7/22/20 at 

22:00;  Stop 7/23/20 at 21:59;  Status DC


Ceftazidime/ Avibactam 2.5 gm/ Sodium Chloride 250 ml @  125 mls/hr Q8HRS IV  

Last administered on 8/3/20at 05:46;  Start 7/23/20 at 08:00


Sodium Chloride 120 meq/Sodium Phosphate 10 mmol/ Potassium Chloride 30 meq/ 

Potassium Acetate 30 meq/Magnesium Sulfate 15 meq/ Multivitamins 10 ml/Chromium/

Copper/Manganese/ Seleni/Zn 1 ml/ Insulin Human Regular 15 unit/ Total 

Parenteral Nutrition/Amino Acids/Dextrose/ Fat Emulsion Intravenous 1,680 ml @  

70 mls/hr TPN  CONT IV  Last administered on 7/23/20at 22:35;  Start 7/23/20 at 

22:00;  Stop 7/24/20 at 21:59;  Status DC


Alprazolam (Xanax) 0.5 mg PRN QID  PRN PO ANXIETY / AGITATION Last administered 

on 7/27/20at 14:21;  Start 7/23/20 at 16:00


Acetaminophen/ Hydrocodone Bitart (Lortab 5/325) 1 tab PRN Q4HRS  PRN PO PAIN 

Last administered on 7/24/20at 19:34;  Start 7/23/20 at 16:00


Sodium Chloride 120 meq/Sodium Phosphate 10 mmol/ Potassium Chloride 30 meq/ 

Potassium Acetate 30 meq/Magnesium Sulfate 15 meq/ Multivitamins 10 ml/Chromium/

Copper/Manganese/ Seleni/Zn 1 ml/ Insulin Human Regular 15 unit/ Total 

Parenteral Nutrition/Amino Acids/Dextrose/ Fat Emulsion Intravenous 1,680 ml @  

70 mls/hr TPN  CONT IV  Last administered on 7/24/20at 21:50;  Start 7/24/20 at 

22:00;  Stop 7/25/20 at 21:59;  Status DC


Sodium Chloride 120 meq/Sodium Phosphate 10 mmol/ Potassium Chloride 30 meq/ 

Potassium Acetate 30 meq/Magnesium Sulfate 15 meq/ Multivitamins 10 ml/Chromium/

Copper/Manganese/ Seleni/Zn 1 ml/ Insulin Human Regular 15 unit/ Total 

Parenteral Nutrition/Amino Acids/Dextrose/ Fat Emulsion Intravenous 1,680 ml @  

70 mls/hr TPN  CONT IV  Last administered on 7/25/20at 22:14;  Start 7/25/20 at 

22:00;  Stop 7/26/20 at 21:59;  Status DC


Daptomycin 500 mg/ Sodium Chloride 50 ml @  100 mls/hr Q24H IV  Last 

administered on 8/2/20at 09:32;  Start 7/26/20 at 09:00


Sodium Chloride 120 meq/Sodium Phosphate 10 mmol/ Potassium Chloride 30 meq/ 

Potassium Acetate 30 meq/Magnesium Sulfate 15 meq/ Multivitamins 10 ml/Chromium/

Copper/Manganese/ Seleni/Zn 1 ml/ Insulin Human Regular 15 unit/ Total 

Parenteral Nutrition/Amino Acids/Dextrose/ Fat Emulsion Intravenous 1,680 ml @  

70 mls/hr TPN  CONT IV ;  Start 7/26/20 at 22:00;  Stop 7/27/20 at 21:59;  S

tatus Cancel


Amino Acids/ Glycerin/ Electrolytes 1,000 ml @  80 mls/hr [id number] IV  Last 

administered on 8/1/20at 18:10;  Start 7/26/20 at 10:15;  Stop 8/2/20 at 07:07; 

Status DC


Iohexol (Omnipaque 300 Mg/ml) 50 ml 1X  ONCE IJ ;  Start 7/28/20 at 11:00;  Stop

7/28/20 at 11:01;  Status DC


Sodium Chloride 500 ml @  500 mls/hr 1X  ONCE IV  Last administered on 7/28/20at

18:30;  Start 7/28/20 at 18:30;  Stop 7/28/20 at 19:29;  Status DC


Lidocaine HCl (Buffered Lidocaine 1%) 3 ml 1X  ONCE INJ ;  Start 7/30/20 at 

12:15;  Stop 7/30/20 at 12:17;  Status DC





Active Scripts


Active


Reported


Bisoprolol Fumarate 5 Mg Tablet 10 Mg PO DAILY


Vitals/I & O





Vital Sign - Last 24 Hours








 8/2/20 8/2/20 8/2/20 8/2/20





 11:00 11:08 11:34 15:00


 


Temp 99.2   100.6





 99.2   100.6


 


Pulse 119   98


 


Resp 32 31  29


 


B/P (MAP) 163/69 (100)   140/77 (98)


 


Pulse Ox 97 98 97 95


 


O2 Delivery Room Air Room Air Room Air Room Air


 


    





    





 8/2/20 8/2/20 8/2/20 8/2/20





 15:53 19:25 20:10 20:15


 


Temp  98.2  





  98.2  


 


Pulse  120  


 


Resp  28  


 


B/P (MAP)  142/69 (93)  


 


Pulse Ox  99  94


 


O2 Delivery Room Air Room Air Room Air Room Air


 


    





    





 8/2/20 8/2/20 8/3/20 8/3/20





 22:30 22:56 00:06 03:00


 


Temp 98.0   99.3





 98.0   99.3


 


Pulse 122   129


 


Resp 33 28  28


 


B/P (MAP) 137/84 (101)   173/65 (101)


 


Pulse Ox 96 96 96 95


 


O2 Delivery Tracheal Collar Room Air Room Air Tracheal Collar


 


    





    





 8/3/20 8/3/20 8/3/20 8/3/20





 03:35 04:05 05:25 05:30


 


Pulse    124


 


Resp   26 


 


B/P (MAP)    154/65 (94)


 


Pulse Ox 97 95 95 


 


O2 Delivery Room Air Room Air Room Air 





 8/3/20 8/3/20 8/3/20 8/3/20





 06:34 07:12 08:24 08:25


 


Resp   30 


 


Pulse Ox 95 95 95 95


 


O2 Delivery Room Air Room Air Room Air Room Air














Intake and Output   


 


 8/2/20 8/2/20 8/3/20





 15:00 23:00 07:00


 


Intake Total 650 ml 1305 ml 680 ml


 


Output Total 350 ml 500 ml 450 ml


 


Balance 300 ml 805 ml 230 ml











Justicifation of Admission Dx:


Justifications for Admission:


Justification of Admission Dx:  Yes











CLEMENTINA PANTOJA MD            Aug 3, 2020 09:20

## 2020-08-03 NOTE — PDOC
Infectious Disease Note


Subjective


Subjective


awake, says feeling ok, trach





ROS


ROS


no n/v/d/sob/fever





Vital Sign


Vital Signs





Vital Signs








  Date Time  Temp Pulse Resp B/P (MAP) Pulse Ox O2 Delivery O2 Flow Rate FiO2


 


8/3/20 07:12     95 Room Air  


 


8/3/20 05:30  124  154/65 (94)    


 


8/3/20 05:25   26     


 


8/3/20 03:00 99.3       





 99.3       











Physical Exam


PHYSICAL EXAM


GENERAL: pt in bed, appears weak -comfortable -alert


HEENT: Pupils equal, oral cavity dry. OC/Op - Dry


NECK:  Tracheostomy - no JVD


LUNGS: Diminished aeration bases,  CT on left 


HEART:  S1, S2, 


ABDOMEN: Less Distended mild- bowel sounds hypoactive, soft, richardson x 2, ROBERT 

drains, G-J tube. Some drainage around R quad tube again and mild insertion site

irritation


: [place] in place 


EXTREMITIES: Trace generalized edema, no cyanosis. Yeast in groin area


SKIN: warm touch. No signs of rash.  


LUE- Previous PICC site without signs of complications. PIV s clean


NEURO: - Alert and responsive


PICC RUE - clean





Labs


Lab





Laboratory Tests








Test


 8/2/20


14:00 8/2/20


17:23 8/2/20


23:34 8/3/20


05:25


 


White Blood Count


 13.9 x10^3/uL


(4.0-11.0) 


 


 14.0 x10^3/uL


(4.0-11.0)


 


Red Blood Count


 2.66 x10^6/uL


(3.50-5.40) 


 


 2.73 x10^6/uL


(3.50-5.40)


 


Hemoglobin


 7.4 g/dL


(12.0-15.5) 


 


 7.4 g/dL


(12.0-15.5)


 


Hematocrit


 23.0 %


(36.0-47.0) 


 


 23.5 %


(36.0-47.0)


 


Mean Corpuscular Volume 87 fL ()    86 fL () 


 


Mean Corpuscular Hemoglobin 28 pg (25-35)    27 pg (25-35) 


 


Mean Corpuscular Hemoglobin


Concent 32 g/dL


(31-37) 


 


 32 g/dL


(31-37)


 


Red Cell Distribution Width


 17.7 %


(11.5-14.5) 


 


 17.8 %


(11.5-14.5)


 


Platelet Count


 621 x10^3/uL


(140-400) 


 


 577 x10^3/uL


(140-400)


 


Glucose (Fingerstick)


 


 133 mg/dL


(70-99) 120 mg/dL


(70-99) 





 


Neutrophils (%) (Auto)    81 % (31-73) 


 


Lymphocytes (%) (Auto)    10 % (24-48) 


 


Monocytes (%) (Auto)    7 % (0-9) 


 


Eosinophils (%) (Auto)    2 % (0-3) 


 


Basophils (%) (Auto)    0 % (0-3) 


 


Neutrophils # (Auto)


 


 


 


 11.3 x10^3/uL


(1.8-7.7)


 


Lymphocytes # (Auto)


 


 


 


 1.4 x10^3/uL


(1.0-4.8)


 


Monocytes # (Auto)


 


 


 


 1.0 x10^3/uL


(0.0-1.1)


 


Eosinophils # (Auto)


 


 


 


 0.3 x10^3/uL


(0.0-0.7)


 


Basophils # (Auto)


 


 


 


 0.0 x10^3/uL


(0.0-0.2)


 


Sodium Level


 


 


 


 136 mmol/L


(136-145)


 


Potassium Level


 


 


 


 4.2 mmol/L


(3.5-5.1)


 


Chloride Level


 


 


 


 102 mmol/L


()


 


Carbon Dioxide Level


 


 


 


 27 mmol/L


(21-32)


 


Anion Gap    7 (6-14) 


 


Blood Urea Nitrogen    8 mg/dL (7-20) 


 


Creatinine


 


 


 


 0.7 mg/dL


(0.6-1.0)


 


Estimated GFR


(Cockcroft-Gault) 


 


 


 88.9 





 


Glucose Level


 


 


 


 140 mg/dL


(70-99)


 


Calcium Level


 


 


 


 10.7 mg/dL


(8.5-10.1)


 


Test


 8/3/20


06:02 


 


 





 


Glucose (Fingerstick)


 143 mg/dL


(70-99) 


 


 











Micro





BC neg





Objective


Assessment


Patient with prolonged hospitalization more than 4 months


Multiple medical problems


Multiple surgical procedures





URINE with parapsilosis


S/P Exp. 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 

parapsilosis & PSAE (I-merrem/ceftazidime/AZT/cefepime))


Leukocytosis - better


Loose stool but on tube feed - WBC down and no gross fever


Fever - 7/25 c-diff neg


Anemia


Acute gallstone pancreatitis with persistent necrosis


  - 4/9.  CT A/P Increased ascites. Persistent evidence of necrotizing 

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


  - 4/27. status post ROBERT drain placement; C. parapsilosis. s/p drain 5/6 + yeast

& high amylase; s/p additional drain on 5/8. Drains removed. 


  -5/6. fluid  candida parapsilosis fluid, amylase high


  - 6/6 showed multiple pseudocysts, slight larger on the right. s/p drains x 3,

6/7.  + PSAE (MDRO-R Cefepime, Zosyn ANSON < 64) and yeast, 


  -6/7 s/p drain replacement x 3; fluid cult PSAE (MDRO), yeast; treated


  -7/12 CT A/P shows smaller fluid collections.  


  -722 CT abdomen and pelvis drains in place       


Ascites s/p paracentesis 4/15 & 5/6. C. parapsilosis 


Cholelithiasis with thickening of the gallbladder wall.


JED, Hyperkalemia, Metabolic acidosis off dialysis


Acute hypoxic resp failure. trach/vent. sputum 6/13  + PSAE (I merrem) ; sputum 

culture July 19+ for PSAE R Merrem, sensitive to cefepime


Pleural effusion status post CTS left side


 Abdominal fluid culture 6 /7 MDRO Pseudomonas, yeast


Sputum culture positive 7/19 for MDRO Pseudomonas


Chest tube fluid positive for 7/21 Candida Parapsilosis





Plan


Plan of Care





Anemia per primary


Electrolyes per primary


[place] changed 7/27


Continue Avycaz 7/23 /micafungin 6/30


Restart daptomycin 7/26(CK <7 7/28) wean soon


CBC/BMP in am


Nystatin to groin


UA and urine culture Blood culture/Cath tip neg - neg


C. difficile negative


Monitor WBC/temp 


Wound care /drain management as directed


Contact isolation for CRE/MDRO








Critically ill





Long term prognosis  poor





D/w nursing











LINN FRANZ MD                Aug 3, 2020 07:18

## 2020-08-03 NOTE — PDOC
PULMONARY PROGRESS NOTES


Subjective


Resting comfortable on trach W/ room air 


no complaints, no overnight concerns from nursing


Vitals





Vital Signs








  Date Time  Temp Pulse Resp B/P (MAP) Pulse Ox O2 Delivery O2 Flow Rate FiO2


 


8/3/20 09:39     99 Room Air  


 


8/3/20 08:24   30     


 


8/3/20 05:30  124  154/65 (94)    


 


8/3/20 03:00 99.3       





 99.3       








ROS:  No Nausea, No Chest Pain, No Abdominal Pain, No Increase Cough


General:  Alert


HEENT:  Other (trach site CDI)


Lungs:  Clear


Cardiovascular:  S1, S2


Abdomen:  Soft, Non-tender, Other (multiple ROBERT drains )


Neuro Exam:  Alert


Extremities:  Other (+1 BLE edema)


Skin:  Warm


Labs





Laboratory Tests








Test


 8/1/20


11:48 8/1/20


18:08 8/1/20


23:58 8/2/20


05:40


 


Glucose (Fingerstick)


 147 mg/dL


(70-99) 142 mg/dL


(70-99) 135 mg/dL


(70-99) 





 


White Blood Count


 


 


 


 13.6 x10^3/uL


(4.0-11.0)


 


Red Blood Count


 


 


 


 2.50 x10^6/uL


(3.50-5.40)


 


Hemoglobin


 


 


 


 6.9 g/dL


(12.0-15.5)


 


Hematocrit


 


 


 


 21.8 %


(36.0-47.0)


 


Mean Corpuscular Volume    87 fL () 


 


Mean Corpuscular Hemoglobin    28 pg (25-35) 


 


Mean Corpuscular Hemoglobin


Concent 


 


 


 32 g/dL


(31-37)


 


Red Cell Distribution Width


 


 


 


 17.4 %


(11.5-14.5)


 


Platelet Count


 


 


 


 608 x10^3/uL


(140-400)


 


Neutrophils (%) (Auto)    80 % (31-73) 


 


Lymphocytes (%) (Auto)    11 % (24-48) 


 


Monocytes (%) (Auto)    7 % (0-9) 


 


Eosinophils (%) (Auto)    2 % (0-3) 


 


Basophils (%) (Auto)    0 % (0-3) 


 


Neutrophils # (Auto)


 


 


 


 10.9 x10^3/uL


(1.8-7.7)


 


Lymphocytes # (Auto)


 


 


 


 1.5 x10^3/uL


(1.0-4.8)


 


Monocytes # (Auto)


 


 


 


 0.9 x10^3/uL


(0.0-1.1)


 


Eosinophils # (Auto)


 


 


 


 0.2 x10^3/uL


(0.0-0.7)


 


Basophils # (Auto)


 


 


 


 0.1 x10^3/uL


(0.0-0.2)


 


Sodium Level


 


 


 


 129 mmol/L


(136-145)


 


Potassium Level


 


 


 


 4.9 mmol/L


(3.5-5.1)


 


Chloride Level


 


 


 


 100 mmol/L


()


 


Carbon Dioxide Level


 


 


 


 26 mmol/L


(21-32)


 


Anion Gap    3 (6-14) 


 


Blood Urea Nitrogen    8 mg/dL (7-20) 


 


Creatinine


 


 


 


 0.5 mg/dL


(0.6-1.0)


 


Estimated GFR


(Cockcroft-Gault) 


 


 


 131.1 





 


Glucose Level


 


 


 


 134 mg/dL


(70-99)


 


Calcium Level


 


 


 


 9.9 mg/dL


(8.5-10.1)


 


Test


 8/2/20


05:42 8/2/20


14:00 8/2/20


17:23 8/2/20


23:34


 


Glucose (Fingerstick)


 147 mg/dL


(70-99) 


 133 mg/dL


(70-99) 120 mg/dL


(70-99)


 


White Blood Count


 


 13.9 x10^3/uL


(4.0-11.0) 


 





 


Red Blood Count


 


 2.66 x10^6/uL


(3.50-5.40) 


 





 


Hemoglobin


 


 7.4 g/dL


(12.0-15.5) 


 





 


Hematocrit


 


 23.0 %


(36.0-47.0) 


 





 


Mean Corpuscular Volume  87 fL ()   


 


Mean Corpuscular Hemoglobin  28 pg (25-35)   


 


Mean Corpuscular Hemoglobin


Concent 


 32 g/dL


(31-37) 


 





 


Red Cell Distribution Width


 


 17.7 %


(11.5-14.5) 


 





 


Platelet Count


 


 621 x10^3/uL


(140-400) 


 





 


Test


 8/3/20


05:25 8/3/20


06:02 


 





 


White Blood Count


 14.0 x10^3/uL


(4.0-11.0) 


 


 





 


Red Blood Count


 2.73 x10^6/uL


(3.50-5.40) 


 


 





 


Hemoglobin


 7.4 g/dL


(12.0-15.5) 


 


 





 


Hematocrit


 23.5 %


(36.0-47.0) 


 


 





 


Mean Corpuscular Volume 86 fL ()    


 


Mean Corpuscular Hemoglobin 27 pg (25-35)    


 


Mean Corpuscular Hemoglobin


Concent 32 g/dL


(31-37) 


 


 





 


Red Cell Distribution Width


 17.8 %


(11.5-14.5) 


 


 





 


Platelet Count


 577 x10^3/uL


(140-400) 


 


 





 


Neutrophils (%) (Auto) 81 % (31-73)    


 


Lymphocytes (%) (Auto) 10 % (24-48)    


 


Monocytes (%) (Auto) 7 % (0-9)    


 


Eosinophils (%) (Auto) 2 % (0-3)    


 


Basophils (%) (Auto) 0 % (0-3)    


 


Neutrophils # (Auto)


 11.3 x10^3/uL


(1.8-7.7) 


 


 





 


Lymphocytes # (Auto)


 1.4 x10^3/uL


(1.0-4.8) 


 


 





 


Monocytes # (Auto)


 1.0 x10^3/uL


(0.0-1.1) 


 


 





 


Eosinophils # (Auto)


 0.3 x10^3/uL


(0.0-0.7) 


 


 





 


Basophils # (Auto)


 0.0 x10^3/uL


(0.0-0.2) 


 


 





 


Sodium Level


 136 mmol/L


(136-145) 


 


 





 


Potassium Level


 4.2 mmol/L


(3.5-5.1) 


 


 





 


Chloride Level


 102 mmol/L


() 


 


 





 


Carbon Dioxide Level


 27 mmol/L


(21-32) 


 


 





 


Anion Gap 7 (6-14)    


 


Blood Urea Nitrogen 8 mg/dL (7-20)    


 


Creatinine


 0.7 mg/dL


(0.6-1.0) 


 


 





 


Estimated GFR


(Cockcroft-Gault) 88.9 


 


 


 





 


Glucose Level


 140 mg/dL


(70-99) 


 


 





 


Calcium Level


 10.7 mg/dL


(8.5-10.1) 


 


 





 


Glucose (Fingerstick)


 


 143 mg/dL


(70-99) 


 











Laboratory Tests








Test


 8/2/20


14:00 8/2/20


17:23 8/2/20


23:34 8/3/20


05:25


 


White Blood Count


 13.9 x10^3/uL


(4.0-11.0) 


 


 14.0 x10^3/uL


(4.0-11.0)


 


Red Blood Count


 2.66 x10^6/uL


(3.50-5.40) 


 


 2.73 x10^6/uL


(3.50-5.40)


 


Hemoglobin


 7.4 g/dL


(12.0-15.5) 


 


 7.4 g/dL


(12.0-15.5)


 


Hematocrit


 23.0 %


(36.0-47.0) 


 


 23.5 %


(36.0-47.0)


 


Mean Corpuscular Volume 87 fL ()    86 fL () 


 


Mean Corpuscular Hemoglobin 28 pg (25-35)    27 pg (25-35) 


 


Mean Corpuscular Hemoglobin


Concent 32 g/dL


(31-37) 


 


 32 g/dL


(31-37)


 


Red Cell Distribution Width


 17.7 %


(11.5-14.5) 


 


 17.8 %


(11.5-14.5)


 


Platelet Count


 621 x10^3/uL


(140-400) 


 


 577 x10^3/uL


(140-400)


 


Glucose (Fingerstick)


 


 133 mg/dL


(70-99) 120 mg/dL


(70-99) 





 


Neutrophils (%) (Auto)    81 % (31-73) 


 


Lymphocytes (%) (Auto)    10 % (24-48) 


 


Monocytes (%) (Auto)    7 % (0-9) 


 


Eosinophils (%) (Auto)    2 % (0-3) 


 


Basophils (%) (Auto)    0 % (0-3) 


 


Neutrophils # (Auto)


 


 


 


 11.3 x10^3/uL


(1.8-7.7)


 


Lymphocytes # (Auto)


 


 


 


 1.4 x10^3/uL


(1.0-4.8)


 


Monocytes # (Auto)


 


 


 


 1.0 x10^3/uL


(0.0-1.1)


 


Eosinophils # (Auto)


 


 


 


 0.3 x10^3/uL


(0.0-0.7)


 


Basophils # (Auto)


 


 


 


 0.0 x10^3/uL


(0.0-0.2)


 


Sodium Level


 


 


 


 136 mmol/L


(136-145)


 


Potassium Level


 


 


 


 4.2 mmol/L


(3.5-5.1)


 


Chloride Level


 


 


 


 102 mmol/L


()


 


Carbon Dioxide Level


 


 


 


 27 mmol/L


(21-32)


 


Anion Gap    7 (6-14) 


 


Blood Urea Nitrogen    8 mg/dL (7-20) 


 


Creatinine


 


 


 


 0.7 mg/dL


(0.6-1.0)


 


Estimated GFR


(Cockcroft-Gault) 


 


 


 88.9 





 


Glucose Level


 


 


 


 140 mg/dL


(70-99)


 


Calcium Level


 


 


 


 10.7 mg/dL


(8.5-10.1)


 


Test


 8/3/20


06:02 


 


 





 


Glucose (Fingerstick)


 143 mg/dL


(70-99) 


 


 











Medications





Active Scripts








 Medications  Dose


 Route/Sig


 Max Daily Dose Days Date Category


 


 Bisoprolol


 Fumarate 5 Mg


 Tablet  10 Mg


 PO DAILY


   3/16/20 Reported








Comments








ct reviewed 7/12/20, Decreased left-sided effusion after catheter placement. The




right-sided effusion has increased as has atelectasis.


 





 


There has been exchange or placement of multiple drainage 


tubes and a gastrojejunostomy tube. Both collections are smaller. No 


significant new abdominal fluid collection is seen. The jejunal component 


of the gastrojejunostomy tube appears to be looped in the proximal small 


bowel. 











ct abdomen /pelvis 6/6


1. Removal of the percutaneous pigtail drainage catheters since the prior 


exam. Sequela of pancreatitis with extensive pseudocysts again 


demonstrated, the right-sided collections are slightly larger since the 


prior exam, the left-sided collections are stable. See above.


2. Moderate to large left pleural effusion with atelectasis and collapse 


of most of the left lower lobe, stable. Small right pleural effusion is 


stable.


3. Gallstone.





ct chest 6/15 reviewed








 GRAM NEG COCCOBACILLI:MANY


        SQUAMOUS EPI CELL:RARE


        PMN (WBCs):FEW


        Unless otherwise specified, Testing Performed by:


        [address] [address]


        For Inquires, the Physician may contact the Microbiology


        department at [phone]





  RESPIRATORY CULTURE  Final  


        Final





        MANY GRAM NEGATIVE RODS on 06/15/20 at 1107


        FINAL ID= [PSEUDOMONAS [id number]]


        MICRO CHARGES


        PSEUDOMONAS AERUGINOSA





  ANTIMICROBIAL SUSCEPTIBILITY  Final  


        Comment





        NEG ANSON 56


        PSEUDOMONAS AERUGINOSA


        ANTIBIOTIC                        RESULT          INTERPRETATION


        AMIKACIN                          <=16                  S


        AZTREONAM                         <=4                   S


        CEFTAZIDIME                       <=1                   S


        CIPROFLOXACIN                     <=0.25                S


        CEFEPIME                          <=2                   S


        CEFTAZIDIME/AVIBACTAM             <=4                   S


        GENTAMICIN                        <=2                   S


        LEVOFLOXACIN                      <=0.5                 S





Impression


.


IMPRESSION:


1.  Acute hypoxemic respiratory failure secondary to ARDS status post trach, 

developed anemia 6/7, blood drainage from RLQ abdomen drain site, and 

surrounding firmness  / developed septic shock 6/7 from abdomen source, required

levo 6/7-- now on room air with trach 


s/p 3 new drains 6/7 with brown color drainage, --- off pressors


S/P Exp. 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 

parapsilosis & PSAE (I-merrem/ceftazidime/AZT/cefepime))


Leucocytosis -improved


Fever---resolved


Acute gallstone pancreatitis with persistent necrosis


  - 4/9.  CT A/P Increased ascites. Persistent evidence of necrotizing 

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


  - 4/27. status post ROBERT drain placement; C. parapsilosis. s/p drain 5/6 + yeast

& high amylase; s/p additional drain on 5/8. Drains removed. 


  -5/6. fluid  candida parapsilosis fluid, amylase high


  - 6/6 showed multiple pseudocysts, slight larger on the right. s/p drains x 3,

6/7.  + PSAE (MDRO-R Cefepime, Zosyn ANSON < 64) and yeast, 


  -6/7 s/p drain replacement x 3; fluid cult PSAE (MDRO), yeast; treated


  -7/12 CT A/P shows smaller fluid collections.  


  -722 CT abdomen and pelvis drains in place       


Ascites s/p paracentesis 4/15 & 5/6. C. parapsilosis 


Cholelithiasis with thickening of the gallbladder wall.


JED, Hyperkalemia, Metabolic acidosis off dialysis


Acute hypoxic resp failure. trach/vent. sputum 6/13  + PSAE (I merrem) ; sputum 

culture July 19+ for PSAE R Merrem, sensitive to cefepime


Pleural effusion status post CTS left side


Abdominal fluid culture 6 /7 MDRO Pseudomonas, yeast


Sputum culture positive 7/19 for MDRO Pseudomonas


Chest tube fluid positive for 7/21 Candida Parapsilosis


S/P Exploratory laparotomy, lysis of adhesions, subtotal cholecystectomy with 

cholangiogram, gastrojejunostomy tube placement, pancreatic necrosectomy














Plan


.


Stable from pulmonary stand point 


chest tube REMOVED 7/24


Transfuse as needed, monitor HGB


Antibiotics per ID 


Trach shield, as tolerated, on room air-- attempt PMV/Capped


Up to chair, PT/OT


Follow surgery input


DVT GI prophylaxis


f/u BC /resp cultures 





DVT/GI PPX








stable from pulmonary standpoint we will sign off, please call with any future 

concerns thank you 


may transfer out of ICU from our stand point 














SHARYN SOLORZANO MD                  Aug 3, 2020 10:08

## 2020-08-03 NOTE — PDOC
[facility] [facility] PROGRESS NOTE


Chief Complaint


Chief Complaint


S/P Exp. 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 

parapsilosis & PSAE (I-merrem/ceftazidime/AZT/cefepime))


Leucocytosis -stable


[id number] last night


Acute gallstone pancreatitis with persistent necrosis


Acute hypoxic Respiratory failure required mechanical ventilation


Tracheostomy Wednesday.


bilateral pleural effusions/pulm edema s/p Throacentesis on 6/15/2020


Severe Acute gallstone pancreatitis (not a surgical candidate at this time) with

necrosis


Acute kidney failure now requiring dialysis


Gallstones (Calculus of gallbladder with acute cholecystitis without 

obstruction)


HTN 


Intractable pain


Intractable nausea


Covid 19 negative. 


Acute on chronic anemia 


EEG: No seizure activity


Fever  - intermittent 


? Ileus with vomiting


Abd distention - U/S and CT reviewed s/p 0.4 L of opaque, debris-containing 

ascites was removed 5/6


Acute pancreatitis with persistent necrosis


Gallstone pancreatitis with necrosis. 


   -CT A/P 6/6 showed multiple pseudocysts, slight larger on the right. s/p 

drains x 3, 6/7.  + PSAE (MDRO-R Cefepime, Zosyn ANSON < 64) and yeast, 


   -s/p drain 4/27. C. parapsilosis. s/p drain 5/6 + yeast & high amylase; s/p 

additional drain on 5/8. Drains removed. 


Ascites s/p paracentesis 4/15 & 5/6. C. parapsilosis 


JED. off HD. 


A large fluid collection in the pancreatic bed has slightly decreased in size, 

described below, the pancreas itself is difficult to  visualize, which could be 

due to necrosis or obscuration of pancreatic  parenchyma from the surrounding 

fluid collection.6/15 


- 4/27 status post ROBERT drain placement + C paropsilosis. s/p additional drains 

5/8


Anemia - S/p PRBCs. 


Cholelithiasis with thickening of the gallbladder wall.


Leucocytosis improving


JED, hyperkalemia, Metabolic acidosis off dialysis


hypocalcemia 


Prediabetes


HTN


s/p trach


Hyperglycemia


severe protein-caloric malnutrition


Moderate to large left pleural effusion with atelectasis and collapse  of most 

of the left lower lobe, stable


Extensive retroperitoneal fluid collections persist. Percutaneous  drains remain

within the collections in both paracolic gutters. These  communicate with 

additional pelvic and peripancreatic collections.











PLAN================





Repeat hemoglobin in the afternoon


Type and cross for 1 unit PRBC


per ID recommendations, continue Avycaz, micafungin, daptomycin.  Nystatin to 

the groin


Negative C diff


BC from 7/15 neg to date 


7/26 PICC line removed will start PPN for 24 hours then replace PICC line other 

side


Follow surgery input regarding diet and drains, pending GJ tube placement with 

IR before restarting tube feeds.


DVT GI prophylaxis


f/u BC /resp cultures


continue DVT/GI PPX





Discussed with RN

















45 MIN CC TIME





History of Present Illness


History of Present Illness


No overnight events. Calcium 10.7 on AM labs. She is still a bit slow to to 

respond, but follows commands well. Does not want speaking valve with me. Pain 

is better controlled in her abdomen. Wound care to see today. Will check liver 

function and phos levels given her calcium elevation.





8/2/2020


Patient seen and examined bedside.  Positive fluid balance in the past 24 hours.

 Patient's chart, labs, images were reviewed and discussed with RN





8/1/2020


No acute events overnight.  Seen and examined at bedside.  Patient had a fever 

100.2.  Negative fluid balance of 150 cc.  Patient's chart, labs, images were 

reviewed and discussed with RN





7/31/2020


Patient seen and examined at bedside.  No acute events overnight.  Positive 

fluid balance 633.  Patient's chart, labs, images were reviewed and discussed 

with RN








7/30/2020


Patient seen and examined bedside.  No acute events overnight.  Patient's chart,

labs, images were reviewed and discussed with RN








7/28/2020


Patient seen and examined bedside.  Patient appears to be in no obvious pain.  

All drains have been adequately draining.  Patient continues to be on TPN.  

Chart labs and imaging was reviewed.  Negative fluid balance of 270 cc


7/27/2020


Patient seen and examined in the ICU.  Patient still requires extensive care.  

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  Chart, labs, imaging was 

reviewed.  UOP with + 500cc balance.








7/25 /2020





Patient seen in and examined in the ICU


She is still extremely critically ill


Appears weak, frail, and pale


Better color today with improved eye contact and expression


Discussed with RN


Chart reviewed











 








7/21/2020


Patient seen and examined in the ICU


She is still extremely critically ill


Appears extremely weak frail and pale


Discussed with RN


Chart reviewed





Vitals/I&O


Vitals/I&O:





                                   Vital Signs








  Date Time  Temp Pulse Resp B/P (MAP) Pulse Ox O2 Delivery O2 Flow Rate FiO2


 


8/3/20 08:25     95 Room Air  


 


8/3/20 08:24   30     


 


8/3/20 05:30  124  154/65 (94)    


 


8/3/20 03:00 99.3       





 99.3       














                                    I & O   


 


 8/2/20 8/2/20 8/3/20





 15:00 23:00 07:00


 


Intake Total 650 ml 1305 ml 680 ml


 


Output Total 350 ml 500 ml 450 ml


 


Balance 300 ml 805 ml 230 ml











Physical Exam


Physical Exam:


GENERAL: pt in bed, appears weak -comfortable -alert


HEENT: Pupils equal, oral cavity dry. OC/Op - Dry


NECK:  Tracheostomy - no JVD


LUNGS: Diminished aeration bases,  CT on left 


HEART:  S1, S2, 


ABDOMEN: Less Distended mild- bowel sounds hypoactive, soft, richardson x 2, ROBERT 

drains, G-J tube. Some drainage around R quad tube again and mild insertion site

irritation


: Chino in place 


EXTREMITIES: Trace generalized edema, no cyanosis. Yeast in groin area


SKIN: warm touch. No signs of rash.  


LUE- Previous PICC site without signs of complications. PIV s clean


NEURO: - Alert and responsive


PICC RUE - clean


General:  Cooperative, No acute distress


Heart:  Other (distant heart sounds, tachycardic)


Lungs:  Crackles


Abdomen:  Soft, Other (old drain site with drainage)


Extremities:  Other (Diffuse edema)


Skin:  No rashes, No significant lesion





Labs


Labs:





Laboratory Tests








Test


 8/2/20


14:00 8/2/20


17:23 8/2/20


23:34 8/3/20


05:25


 


White Blood Count


 13.9 x10^3/uL


(4.0-11.0) 


 


 14.0 x10^3/uL


(4.0-11.0)


 


Red Blood Count


 2.66 x10^6/uL


(3.50-5.40) 


 


 2.73 x10^6/uL


(3.50-5.40)


 


Hemoglobin


 7.4 g/dL


(12.0-15.5) 


 


 7.4 g/dL


(12.0-15.5)


 


Hematocrit


 23.0 %


(36.0-47.0) 


 


 23.5 %


(36.0-47.0)


 


Mean Corpuscular Volume 87 fL ()    86 fL () 


 


Mean Corpuscular Hemoglobin 28 pg (25-35)    27 pg (25-35) 


 


Mean Corpuscular Hemoglobin


Concent 32 g/dL


(31-37) 


 


 32 g/dL


(31-37)


 


Red Cell Distribution Width


 17.7 %


(11.5-14.5) 


 


 17.8 %


(11.5-14.5)


 


Platelet Count


 621 x10^3/uL


(140-400) 


 


 577 x10^3/uL


(140-400)


 


Glucose (Fingerstick)


 


 133 mg/dL


(70-99) 120 mg/dL


(70-99) 





 


Neutrophils (%) (Auto)    81 % (31-73) 


 


Lymphocytes (%) (Auto)    10 % (24-48) 


 


Monocytes (%) (Auto)    7 % (0-9) 


 


Eosinophils (%) (Auto)    2 % (0-3) 


 


Basophils (%) (Auto)    0 % (0-3) 


 


Neutrophils # (Auto)


 


 


 


 11.3 x10^3/uL


(1.8-7.7)


 


Lymphocytes # (Auto)


 


 


 


 1.4 x10^3/uL


(1.0-4.8)


 


Monocytes # (Auto)


 


 


 


 1.0 x10^3/uL


(0.0-1.1)


 


Eosinophils # (Auto)


 


 


 


 0.3 x10^3/uL


(0.0-0.7)


 


Basophils # (Auto)


 


 


 


 0.0 x10^3/uL


(0.0-0.2)


 


Sodium Level


 


 


 


 136 mmol/L


(136-145)


 


Potassium Level


 


 


 


 4.2 mmol/L


(3.5-5.1)


 


Chloride Level


 


 


 


 102 mmol/L


()


 


Carbon Dioxide Level


 


 


 


 27 mmol/L


(21-32)


 


Anion Gap    7 (6-14) 


 


Blood Urea Nitrogen    8 mg/dL (7-20) 


 


Creatinine


 


 


 


 0.7 mg/dL


(0.6-1.0)


 


Estimated GFR


(Cockcroft-Gault) 


 


 


 88.9 





 


Glucose Level


 


 


 


 140 mg/dL


(70-99)


 


Calcium Level


 


 


 


 10.7 mg/dL


(8.5-10.1)


 


Test


 8/3/20


06:02 


 


 





 


Glucose (Fingerstick)


 143 mg/dL


(70-99) 


 


 














Assessment and Plan


Assessmemt and Plan


Problems


Medical Problems:


(1) Acute pancreatitis


Status: Acute  





(2) Cholelithiasis


Status: Acute  











Comment


Review of Relevant


I have reviewed the following items josy (where applicable) has been applied.





Justicifation of Admission Dx:


Justifications for Admission:


Justification of Admission Dx:  Yes











GAETANO GONCALVES MD         Aug 3, 2020 09:30

## 2020-08-04 VITALS — DIASTOLIC BLOOD PRESSURE: 71 MMHG | SYSTOLIC BLOOD PRESSURE: 107 MMHG

## 2020-08-04 VITALS — DIASTOLIC BLOOD PRESSURE: 66 MMHG | SYSTOLIC BLOOD PRESSURE: 112 MMHG

## 2020-08-04 VITALS — DIASTOLIC BLOOD PRESSURE: 76 MMHG | SYSTOLIC BLOOD PRESSURE: 133 MMHG

## 2020-08-04 VITALS — SYSTOLIC BLOOD PRESSURE: 126 MMHG | DIASTOLIC BLOOD PRESSURE: 83 MMHG

## 2020-08-04 VITALS — SYSTOLIC BLOOD PRESSURE: 115 MMHG | DIASTOLIC BLOOD PRESSURE: 64 MMHG

## 2020-08-04 VITALS — DIASTOLIC BLOOD PRESSURE: 79 MMHG | SYSTOLIC BLOOD PRESSURE: 118 MMHG

## 2020-08-04 PROCEDURE — 0D2DXUZ CHANGE FEEDING DEVICE IN LOWER INTESTINAL TRACT, EXTERNAL APPROACH: ICD-10-PCS | Performed by: RADIOLOGY

## 2020-08-04 RX ADMIN — ENOXAPARIN SODIUM SCH MG: 40 INJECTION SUBCUTANEOUS at 09:21

## 2020-08-04 RX ADMIN — IPRATROPIUM BROMIDE AND ALBUTEROL SULFATE SCH ML: .5; 3 SOLUTION RESPIRATORY (INHALATION) at 15:42

## 2020-08-04 RX ADMIN — ACETYLCYSTEINE SCH MG: 200 INHALANT RESPIRATORY (INHALATION) at 08:40

## 2020-08-04 RX ADMIN — FENTANYL CITRATE PRN MCG: 50 INJECTION INTRAMUSCULAR; INTRAVENOUS at 09:02

## 2020-08-04 RX ADMIN — DEXTROSE SCH MLS/HR: 50 INJECTION, SOLUTION INTRAVENOUS at 09:30

## 2020-08-04 RX ADMIN — INSULIN LISPRO SCH UNITS: 100 INJECTION, SOLUTION INTRAVENOUS; SUBCUTANEOUS at 18:00

## 2020-08-04 RX ADMIN — PANTOPRAZOLE SODIUM SCH MG: 40 INJECTION, POWDER, FOR SOLUTION INTRAVENOUS at 09:20

## 2020-08-04 RX ADMIN — IPRATROPIUM BROMIDE AND ALBUTEROL SULFATE SCH ML: .5; 3 SOLUTION RESPIRATORY (INHALATION) at 12:01

## 2020-08-04 RX ADMIN — CEFTAZIDIME, AVIBACTAM SCH MLS/HR: 2; .5 POWDER, FOR SOLUTION INTRAVENOUS at 06:00

## 2020-08-04 RX ADMIN — ONDANSETRON PRN MG: 2 INJECTION INTRAMUSCULAR; INTRAVENOUS at 16:31

## 2020-08-04 RX ADMIN — INSULIN LISPRO SCH UNITS: 100 INJECTION, SOLUTION INTRAVENOUS; SUBCUTANEOUS at 12:00

## 2020-08-04 RX ADMIN — CEFTAZIDIME, AVIBACTAM SCH MLS/HR: 2; .5 POWDER, FOR SOLUTION INTRAVENOUS at 16:03

## 2020-08-04 RX ADMIN — INSULIN LISPRO SCH UNITS: 100 INJECTION, SOLUTION INTRAVENOUS; SUBCUTANEOUS at 06:00

## 2020-08-04 RX ADMIN — ONDANSETRON PRN MG: 2 INJECTION INTRAMUSCULAR; INTRAVENOUS at 04:30

## 2020-08-04 RX ADMIN — IPRATROPIUM BROMIDE AND ALBUTEROL SULFATE SCH ML: .5; 3 SOLUTION RESPIRATORY (INHALATION) at 20:40

## 2020-08-04 RX ADMIN — FENTANYL CITRATE PRN MCG: 50 INJECTION INTRAMUSCULAR; INTRAVENOUS at 15:09

## 2020-08-04 RX ADMIN — PROCHLORPERAZINE EDISYLATE PRN MG: 5 INJECTION INTRAMUSCULAR; INTRAVENOUS at 20:44

## 2020-08-04 RX ADMIN — IPRATROPIUM BROMIDE AND ALBUTEROL SULFATE SCH ML: .5; 3 SOLUTION RESPIRATORY (INHALATION) at 03:14

## 2020-08-04 RX ADMIN — ACETYLCYSTEINE SCH MG: 200 INHALANT RESPIRATORY (INHALATION) at 20:40

## 2020-08-04 RX ADMIN — CEFTAZIDIME, AVIBACTAM SCH MLS/HR: 2; .5 POWDER, FOR SOLUTION INTRAVENOUS at 20:49

## 2020-08-04 RX ADMIN — DAPTOMYCIN SCH MLS/HR: 500 INJECTION, POWDER, LYOPHILIZED, FOR SOLUTION INTRAVENOUS at 09:20

## 2020-08-04 RX ADMIN — IPRATROPIUM BROMIDE AND ALBUTEROL SULFATE SCH ML: .5; 3 SOLUTION RESPIRATORY (INHALATION) at 23:20

## 2020-08-04 RX ADMIN — METOPROLOL TARTRATE PRN MG: 5 INJECTION INTRAVENOUS at 17:40

## 2020-08-04 RX ADMIN — IPRATROPIUM BROMIDE AND ALBUTEROL SULFATE SCH ML: .5; 3 SOLUTION RESPIRATORY (INHALATION) at 08:34

## 2020-08-04 NOTE — NUR
Recvd patient transfer to [address] [place] [address].  Patient transported by PT/OT in wheelchair and placed in 
recliner.  Patient oriented to room with unit routines and call light within reach.  After 
receiving bedside report, attempted to flush tube feed and connect to pump however, tube 
presented to be clogged.  Several attempts to flush and draw back for residual were 
unsuccessful.  NP for general surgery arrived at bedside who ordered vascular lab to 
unclogged or replace tube.

## 2020-08-04 NOTE — PDOC
SAURAV NASH APRN 8/4/20 1247:


SURGICAL PROGRESS NOTE


DATE: 8/4/20 


TIME: 12:46


Subjective


j tube not flushing


Vital Signs





Vital Signs








  Date Time  Temp Pulse Resp B/P (MAP) Pulse Ox O2 Delivery O2 Flow Rate FiO2


 


8/4/20 12:02     95 Room Air  


 


8/4/20 11:56 97.8 121 22 126/83 (97)    





 97.8       


 


8/4/20 09:32       2.0 








I&O











Intake and Output 


 


 8/4/20





 07:00


 


Intake Total 5977 ml


 


Output Total 2355 ml


 


Balance 3622 ml


 


 


 


IV Total 1914 ml


 


Tube Feeding 3013 ml


 


Other 1050 ml


 


Output Urine Total 1075 ml


 


Drainage Total 1280 ml


 


# Bowel Movements 4








General:  Alert, Cooperative


Abdomen:  Soft


Labs





Laboratory Tests








Test


 8/2/20


14:00 8/2/20


17:23 8/2/20


23:34 8/3/20


05:25


 


White Blood Count


 13.9 x10^3/uL


(4.0-11.0) 


 


 14.0 x10^3/uL


(4.0-11.0)


 


Red Blood Count


 2.66 x10^6/uL


(3.50-5.40) 


 


 2.73 x10^6/uL


(3.50-5.40)


 


Hemoglobin


 7.4 g/dL


(12.0-15.5) 


 


 7.4 g/dL


(12.0-15.5)


 


Hematocrit


 23.0 %


(36.0-47.0) 


 


 23.5 %


(36.0-47.0)


 


Mean Corpuscular Volume 87 fL ()    86 fL () 


 


Mean Corpuscular Hemoglobin 28 pg (25-35)    27 pg (25-35) 


 


Mean Corpuscular Hemoglobin


Concent 32 g/dL


(31-37) 


 


 32 g/dL


(31-37)


 


Red Cell Distribution Width


 17.7 %


(11.5-14.5) 


 


 17.8 %


(11.5-14.5)


 


Platelet Count


 621 x10^3/uL


(140-400) 


 


 577 x10^3/uL


(140-400)


 


Glucose (Fingerstick)


 


 133 mg/dL


(70-99) 120 mg/dL


(70-99) 





 


Neutrophils (%) (Auto)    81 % (31-73) 


 


Lymphocytes (%) (Auto)    10 % (24-48) 


 


Monocytes (%) (Auto)    7 % (0-9) 


 


Eosinophils (%) (Auto)    2 % (0-3) 


 


Basophils (%) (Auto)    0 % (0-3) 


 


Neutrophils # (Auto)


 


 


 


 11.3 x10^3/uL


(1.8-7.7)


 


Lymphocytes # (Auto)


 


 


 


 1.4 x10^3/uL


(1.0-4.8)


 


Monocytes # (Auto)


 


 


 


 1.0 x10^3/uL


(0.0-1.1)


 


Eosinophils # (Auto)


 


 


 


 0.3 x10^3/uL


(0.0-0.7)


 


Basophils # (Auto)


 


 


 


 0.0 x10^3/uL


(0.0-0.2)


 


Sodium Level


 


 


 


 136 mmol/L


(136-145)


 


Potassium Level


 


 


 


 4.2 mmol/L


(3.5-5.1)


 


Chloride Level


 


 


 


 102 mmol/L


()


 


Carbon Dioxide Level


 


 


 


 27 mmol/L


(21-32)


 


Anion Gap    7 (6-14) 


 


Blood Urea Nitrogen    8 mg/dL (7-20) 


 


Creatinine


 


 


 


 0.7 mg/dL


(0.6-1.0)


 


Estimated GFR


(Cockcroft-Gault) 


 


 


 88.9 





 


Glucose Level


 


 


 


 140 mg/dL


(70-99)


 


Calcium Level


 


 


 


 10.7 mg/dL


(8.5-10.1)


 


Phosphorus Level


 


 


 


 4.0 mg/dL


(2.6-4.7)


 


Total Bilirubin


 


 


 


 0.3 mg/dL


(0.2-1.0)


 


Direct Bilirubin


 


 


 


 0.2 mg/dL


(0.0-0.2)


 


Aspartate Amino Transf


(AST/SGOT) 


 


 


 23 U/L (15-37) 





 


Alanine Aminotransferase


(ALT/SGPT) 


 


 


 14 U/L (14-59) 





 


Alkaline Phosphatase


 


 


 


 105 U/L


()


 


Total Protein


 


 


 


 4.8 g/dL


(6.4-8.2)


 


Albumin


 


 


 


 1.4 g/dL


(3.4-5.0)


 


Test


 8/3/20


06:02 8/3/20


12:07 8/3/20


18:03 8/3/20


23:18


 


Glucose (Fingerstick)


 143 mg/dL


(70-99) 113 mg/dL


(70-99) 131 mg/dL


(70-99) 107 mg/dL


(70-99)


 


Test


 8/4/20


06:08 8/4/20


12:16 


 





 


Glucose (Fingerstick)


 130 mg/dL


(70-99) 137 mg/dL


(70-99) 


 











Laboratory Tests








Test


 8/3/20


18:03 8/3/20


23:18 8/4/20


06:08 8/4/20


12:16


 


Glucose (Fingerstick)


 131 mg/dL


(70-99) 107 mg/dL


(70-99) 130 mg/dL


(70-99) 137 mg/dL


(70-99)








Problem List


Problems


Medical Problems:


(1) Acute pancreatitis


Status: Acute  





(2) Cholelithiasis


Status: Acute  








Assessment/Plan


will ask IR to eval j tube--unclog, exchange if needed





Justicifation of Admission Dx:


Justifications for Admission:


Justification of Admission Dx:  Yes





GRAEME MASSEY MD 8/4/20 1425:


SURGICAL PROGRESS NOTE


Assessment/Plan


Agree with above











SAURAV NASH             Aug 4, 2020 12:47


GRAEME MASSEY MD              Aug 4, 2020 14:25

## 2020-08-04 NOTE — NUR
Wound/Ostomy Care



Wound Type/Assessment:  Pt has multiple draining areas on abdomen, from previous drain 
sites, RUQ fistula tract draining a moderate amount of effluent. Pt is having some initial 
skin breakdown from the drainage, WC was asked to evaluate.  Pt would benefit from a wound 
drainage collection device that would cover all 3 openings along the R abdomen. Pt's skin 
cleaned with water and washcloths and patted dry. Stoma powder applied to reddened, 
irritated skin and excess brushed off, then skin prep pads "dabbed" over same areas and 
allowed to dry. Wound collection bag cut to fit over the 3 separate openings, stoma ring 
applied to edge of fistula (R middle abdomen) then collection bag applied, hydrocolloid 
strips placed around edges to prevent lifting. Pouch showing strong seal, JAKUB Jeter at 
bedside observing, photo taken of procedure for next application. LUQ ostomy bag intact, 
placed yesterday, will continue to evaluate for any leakage. No other wounds noted, but pt 
does have some chaffing and irritation to inner thighs d/t rubbing and loose stools, 
recommended Vitamin A&D ointment to these and clark/buttock areas, Steffany v/u.



Treatment Recommendations/Plan: Stoma powder and skin prep to skin breakdown around 
fistula/drain sites. Wound collection bag to fistulas/previous drain sites, may connect 
spout to [place]ino bag if drainage continues at moderate level. Vitamin A&D to thighs and clark 
areas.



Education provided: to RN re: skin care and dressings.



Offloading surface/device: N/A



Recommended Referrals/Tests: N/A



Discharge Recommendations for dressings: See assessment and treatment plan.

## 2020-08-04 NOTE — PDOC
[facility] PROGRESS NOTE


Date of Service


DOS:


DATE: 8/4/20 


TIME: 15:13





Chief Complaint


Chief Complaint


S/P Exp. 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 

parapsilosis & PSAE (I-merrem/ceftazidime/AZT/cefepime))


Leucocytosis -stable


[id number] last night


Acute gallstone pancreatitis with persistent necrosis


Acute hypoxic Respiratory failure required mechanical ventilation


Tracheostomy Wednesday.


bilateral pleural effusions/pulm edema s/p Throacentesis on 6/15/2020


Severe Acute gallstone pancreatitis (not a surgical candidate at this time) with

necrosis


Acute kidney failure now requiring dialysis


Gallstones (Calculus of gallbladder with acute cholecystitis without 

obstruction)


HTN 


Intractable pain


Intractable nausea


Covid 19 negative. 


Acute on chronic anemia 


EEG: No seizure activity


Fever  - intermittent 


? Ileus with vomiting


Abd distention - U/S and CT reviewed s/p 0.4 L of opaque, debris-containing 

ascites was removed 5/6


Acute pancreatitis with persistent necrosis


Gallstone pancreatitis with necrosis. 


   -CT A/P 6/6 showed multiple pseudocysts, slight larger on the right. s/p 

drains x 3, 6/7.  + PSAE (MDRO-R Cefepime, Zosyn ANSON < 64) and yeast, 


   -s/p drain 4/27. C. parapsilosis. s/p drain 5/6 + yeast & high amylase; s/p 

additional drain on 5/8. Drains removed. 


Ascites s/p paracentesis 4/15 & 5/6. C. parapsilosis 


JED. off HD. 


A large fluid collection in the pancreatic bed has slightly decreased in size, 

described below, the pancreas itself is difficult to  visualize, which could be 

due to necrosis or obscuration of pancreatic  parenchyma from the surrounding 

fluid collection.6/15 


- 4/27 status post ROBERT drain placement + C paropsilosis. s/p additional drains 

5/8


Anemia - S/p PRBCs. 


Cholelithiasis with thickening of the gallbladder wall.


Leucocytosis improving


JED, hyperkalemia, Metabolic acidosis off dialysis


hypocalcemia 


Prediabetes


HTN


s/p trach


Hyperglycemia


severe protein-caloric malnutrition


Moderate to large left pleural effusion with atelectasis and collapse  of most 

of the left lower lobe, stable


Extensive retroperitoneal fluid collections persist. Percutaneous  drains remain

within the collections in both paracolic gutters. These  communicate with 

additional pelvic and peripancreatic collections.











PLAN================





Repeat hemoglobin in the afternoon


Type and cross for 1 unit PRBC


per ID recommendations, continue Avycaz, micafungin, daptomycin.  Nystatin to 

the groin


Negative C diff


BC from 7/15 neg to date 


7/26 PICC line removed will start PPN for 24 hours then replace PICC line other 

side


Follow surgery input regarding diet and drains, pending GJ tube placement with 

IR before restarting tube feeds.


DVT GI prophylaxis


f/u BC /resp cultures


continue DVT/GI PPX





Discussed with RN

















45 MIN CC TIME





History of Present Illness


History of Present Illness


Not wishing to wear her speaking valve. J tube having some difficulties. 

Afebrile. Rolf bedside to aid her. transferred from ICU today





8/3: No overnight events. Calcium 10.7 on AM labs. She is still a bit slow to to

respond, but follows commands well. Does not want speaking valve with me. Pain 

is better controlled in her abdomen. Wound care to see today. Will check liver 

function and phos levels given her calcium elevation.





8/2/2020


Patient seen and examined bedside.  Positive fluid balance in the past 24 hours.

 Patient's chart, labs, images were reviewed and discussed with RN





8/1/2020


No acute events overnight.  Seen and examined at bedside.  Patient had a fever 

100.2.  Negative fluid balance of 150 cc.  Patient's chart, labs, images were 

reviewed and discussed with RN





7/31/2020


Patient seen and examined at bedside.  No acute events overnight.  Positive 

fluid balance 633.  Patient's chart, labs, images were reviewed and discussed 

with RN








7/30/2020


Patient seen and examined bedside.  No acute events overnight.  Patient's chart,

labs, images were reviewed and discussed with RN








7/28/2020


Patient seen and examined bedside.  Patient appears to be in no obvious pain.  

All drains have been adequately draining.  Patient continues to be on TPN.  

Chart labs and imaging was reviewed.  Negative fluid balance of 270 cc


7/27/2020


Patient seen and examined in the ICU.  Patient still requires extensive care.  

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  Chart, labs, imaging was 

reviewed.  UOP with + 500cc balance.








7/25 /2020





Patient seen in and examined in the ICU


She is still extremely critically ill


Appears weak, frail, and pale


Better color today with improved eye contact and expression


Discussed with RN


Chart reviewed











 








7/21/2020


Patient seen and examined in the ICU


She is still extremely critically ill


Appears extremely weak frail and pale


Discussed with RN


Chart reviewed





Vitals/I&O


Vitals/I&O:





                                   Vital Signs








  Date Time  Temp Pulse Resp B/P (MAP) Pulse Ox O2 Delivery O2 Flow Rate FiO2


 


8/4/20 14:55 98.3 127 18 107/71 (83) 95 Room Air  





 98.3       


 


8/4/20 09:32       2.0 














                                    I & O   


 


 8/3/20 8/3/20 8/4/20





 15:00 23:00 07:00


 


Intake Total 200 ml 3018 ml 2759 ml


 


Output Total 705 ml 575 ml 1075 ml


 


Balance -505 ml 2443 ml 1684 ml











Physical Exam


Physical Exam:


GENERAL: pt in bed, appears weak -comfortable -alert


HEENT: Pupils equal, oral cavity dry. OC/Op - Dry


NECK:  Tracheostomy - no JVD


LUNGS: Diminished aeration bases,  CT on left 


HEART:  S1, S2, 


ABDOMEN: Less Distended mild- bowel sounds hypoactive, soft, richardson x 2, ROBERT 

drains, G-J tube. Some drainage around R quad tube again and mild insertion site

irritation


: [place] in place 


EXTREMITIES: Trace generalized edema, no cyanosis. Yeast in groin area


SKIN: warm touch. No signs of rash.  


LUE- Previous PICC site without signs of complications. PIV s clean


NEURO: - Alert and responsive


PICC RUE - clean


General:  Alert, Cooperative


Heart:  Other (distant heart sounds, tachycardic)


Lungs:  Clear


Abdomen:  Soft


Extremities:  Other (Diffuse edema)


Skin:  No rashes, No significant lesion





Labs


Labs:





Laboratory Tests








Test


 8/3/20


18:03 8/3/20


23:18 8/4/20


06:08 8/4/20


12:16


 


Glucose (Fingerstick)


 131 mg/dL


(70-99) 107 mg/dL


(70-99) 130 mg/dL


(70-99) 137 mg/dL


(70-99)











Assessment and Plan


Assessmemt and Plan


Problems


Medical Problems:


(1) Acute pancreatitis


Status: Acute  





(2) Cholelithiasis


Status: Acute  











Comment


Review of Relevant


I have reviewed the following items josy (where applicable) has been applied.


Medications:





Current Medications








 Medications


  (Trade)  Dose


 Ordered  Sig/Yee


 Route


 PRN Reason  Start Time


 Stop Time Status Last Admin


Dose Admin


 


 Sodium Chloride  1,000 ml @ 


 125 mls/hr  Q8H


 IV


   8/3/20 23:21


 8/4/20 09:23 DC 8/3/20 23:16





 


 Sodium Chloride  1,000 ml @ 


 350 mls/hr  1X  ONCE


 IV


   8/3/20 20:30


 8/3/20 23:21 DC 8/3/20 20:29














Justicifation of Admission Dx:


Justifications for Admission:


Justification of Admission Dx:  Yes











GAETANO GONCALVES MD         Aug 4, 2020 15:15

## 2020-08-04 NOTE — PDOC
Infectious Disease Note


Subjective


Subjective


awake, says feeling ok, trach





ROS


ROS


no n/v/d/sob





Vital Sign


Vital Signs





Vital Signs








  Date Time  Temp Pulse Resp B/P (MAP) Pulse Ox O2 Delivery O2 Flow Rate FiO2


 


8/4/20 07:42 98.9 120 30 133/76 (95) 95 Room Air  





 98.9       











Physical Exam


PHYSICAL EXAM


GENERAL: pt in bed, appears weak -comfortable -alert


HEENT: Pupils equal, oral cavity dry. OC/Op - Dry


NECK:  Tracheostomy - no JVD


LUNGS: Diminished aeration bases,  CT on left 


HEART:  S1, S2, 


ABDOMEN: Less Distended mild- bowel sounds hypoactive, soft, richardson x 2, ROBERT 

drains, G-J tube. Some drainage around R quad tube again and mild insertion site

irritation


: [place] in place 


EXTREMITIES: Trace generalized edema, no cyanosis. Yeast in groin area


SKIN: warm touch. No signs of rash.  


LUE- Previous PICC site without signs of complications. PIV s clean


NEURO: - Alert and responsive


PICC RUE - clean





Labs


Lab





Laboratory Tests








Test


 8/3/20


12:07 8/3/20


18:03 8/3/20


23:18 8/4/20


06:08


 


Glucose (Fingerstick)


 113 mg/dL


(70-99) 131 mg/dL


(70-99) 107 mg/dL


(70-99) 130 mg/dL


(70-99)








Micro





BC neg





Objective


Assessment


Patient with prolonged hospitalization more than 4 months


Multiple medical problems


Multiple surgical procedures





URINE with parapsilosis


S/P Exp. 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 

parapsilosis & PSAE (I-merrem/ceftazidime/AZT/cefepime))


Leukocytosis - better


Loose stool but on tube feed - WBC down and no gross fever


Fever - 7/25 c-diff neg


Anemia


Acute gallstone pancreatitis with persistent necrosis


  - 4/9.  CT A/P Increased ascites. Persistent evidence of necrotizing 

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


  - 4/27. status post ROBERT drain placement; C. parapsilosis. s/p drain 5/6 + yeast

& high amylase; s/p additional drain on 5/8. Drains removed. 


  -5/6. fluid  candida parapsilosis fluid, amylase high


  - 6/6 showed multiple pseudocysts, slight larger on the right. s/p drains x 3,

6/7.  + PSAE (MDRO-R Cefepime, Zosyn ANSON < 64) and yeast, 


  -6/7 s/p drain replacement x 3; fluid cult PSAE (MDRO), yeast; treated


  -7/12 CT A/P shows smaller fluid collections.  


  -722 CT abdomen and pelvis drains in place       


Ascites s/p paracentesis 4/15 & 5/6. C. parapsilosis 


Cholelithiasis with thickening of the gallbladder wall.


JED, Hyperkalemia, Metabolic acidosis off dialysis


Acute hypoxic resp failure. trach/vent. sputum 6/13  + PSAE (I merrem) ; sputum 

culture July 19+ for PSAE R Merrem, sensitive to cefepime


Pleural effusion status post CTS left side


 Abdominal fluid culture 6 /7 MDRO Pseudomonas, yeast


Sputum culture positive 7/19 for MDRO Pseudomonas


Chest tube fluid positive for 7/21 Candida Parapsilosis


EC fistula





Plan


Plan of Care





Anemia per primary


Electrolyes per primary


[place] changed 7/27


Continue Avycaz 7/23 /micafungin 6/30


Restart daptomycin 7/26(CK <7 7/28) wean soon


CBC/BMP in am


Nystatin to groin


UA and urine culture Blood culture/Cath tip neg - neg


C. difficile negative


Monitor WBC/temp 


Wound care /drain management as directed


Contact isolation for CRE/MDRO








Critically ill





Long term prognosis  poor





D/w nursing











LINN FRANZ MD                Aug 4, 2020 08:22

## 2020-08-04 NOTE — NUR
Pt to IR for clogged GJ tube, new tube placed 24 fr tube by Hardy FUENTES. Sutured in place, pt 
tolerated with Fentanyl 25mcg given for comfort.  CASH RN

## 2020-08-04 NOTE — RAD
Procedure: GJ tube exchange under fluoroscopy



Clinical Indication: Adult female with clogged gastrojejunostomy tube



Sedation: Local anesthesia only



Antibiotics: None



Exposure: Kerma-Area Product: Gycm2 OR

                   Fluoro Time: Less than 2 minutes           Images: 3



Sterility: All elements of maximal sterile barrier technique including the use

of a cap, mask, sterile gown, sterile gloves, large sterile sheet, appropriate

hand hygiene, and 2% chlorhexidine for cutaneous antisepsis (or acceptable

alternative antiseptic per current guidelines) were followed for this

procedure. 



Consent: The procedure was explained in its entirety to the patient or the

patients designated representative by a member of the treatment team,

including a discussion of the risks, benefits and commonly accepted

alternatives to the procedure, as well as the expected consequences of no

therapy whatsoever.   Discussion of the risks included, but was not limited

to, those that are most frequent and those that are rare but possibly severe

or life-threatening, as well as the possibility of unforeseen complications.



Technique and Findings: Following informed consent, the patient was prepped

and draped in usual sterile fashion. The existing tube was removed over wire

and a new gastrojejunostomy tube was advanced over the latter fluoroscopic

guidance and positioned with the distal tip in the proximal jejunum in the

gastrostomy tip in the gastric lumen. Contrast was injected through both ports

confirming good location of both the jejunal and gastric tips. Sterile water

was used to inflate the balloon to 10 cc in the tube was pulled taut against

anterior abdominal wall. The retention disc was slid into place and the tube

was sutured into position. Both ports were flushed with normal saline.



Complications: No immediate



Impression:

1. Gastrojejunostomy tube exchange under fluoroscopy as described. Both ports

of this tube are suitable for use.

## 2020-08-04 NOTE — NUR
SS following up with discharge planning. SS reviewed pt chart and discussed with pt RN. All 
drains pulled except for GJ tube. Pt currently on tube feeds. Pt on room air. Pt has ostomy 
bags on drainage sites. Pt currently wearing speaking valve intermittently. Pt on IV 
Daptomycin, IV Micafungin, and IV Avycaz. Pt max assist for PT/OT and staff. PT/OT 
recommending LTACH. Pt self pay. [facility] attempting to work with pt's family to complete 
disability application. Pt transferring to room 254 today. SS will continue to follow for 
discharge planning

## 2020-08-04 NOTE — PDOC
Date of Service:


DATE: 8/4/20 


TIME: 10:09





Objective:


Objective:


D/w nurse - wound care to see, no change from GI standpoint.


Vital Signs:





                                   Vital Signs








  Date Time  Temp Pulse Resp B/P (MAP) Pulse Ox O2 Delivery O2 Flow Rate FiO2


 


8/4/20 09:02   26  95  2.0 


 


8/4/20 08:34      Room Air  


 


8/4/20 07:42 98.9 120  133/76 (95)    





 98.9       








Labs:





Laboratory Tests








Test


 8/3/20


12:07 8/3/20


18:03 8/3/20


23:18 8/4/20


06:08


 


Glucose (Fingerstick)


 113 mg/dL


(70-99) 131 mg/dL


(70-99) 107 mg/dL


(70-99) 130 mg/dL


(70-99)











PE:





GEN: chronically ill - RT present suctioning


NEURO/PSYCH: awake and alert





A/P:


S/p pancreatic necrosectomy, complications





--


Supportive care.





Justicifation of Admission Dx:


Justifications for Admission:


Justification of Admission Dx:  Yes











RAGHAVENDRA MURCIA          Aug 4, 2020 10:10

## 2020-08-04 NOTE — NUR
Pharmacy messaged for Avycaz--pharmacy called back to notify that Avycaz is currently out 
but they will be getting more this morning. Avycaz left on eMAR for day shift RN to give 
when it comes back in stock.

## 2020-08-05 VITALS — DIASTOLIC BLOOD PRESSURE: 79 MMHG | SYSTOLIC BLOOD PRESSURE: 127 MMHG

## 2020-08-05 VITALS — SYSTOLIC BLOOD PRESSURE: 128 MMHG | DIASTOLIC BLOOD PRESSURE: 88 MMHG

## 2020-08-05 VITALS — DIASTOLIC BLOOD PRESSURE: 100 MMHG | SYSTOLIC BLOOD PRESSURE: 146 MMHG

## 2020-08-05 VITALS — SYSTOLIC BLOOD PRESSURE: 130 MMHG | DIASTOLIC BLOOD PRESSURE: 86 MMHG

## 2020-08-05 VITALS — DIASTOLIC BLOOD PRESSURE: 84 MMHG | SYSTOLIC BLOOD PRESSURE: 136 MMHG

## 2020-08-05 VITALS — DIASTOLIC BLOOD PRESSURE: 61 MMHG | SYSTOLIC BLOOD PRESSURE: 115 MMHG

## 2020-08-05 LAB
ALBUMIN SERPL-MCNC: 1.3 G/DL (ref 3.4–5)
ALBUMIN/GLOB SERPL: 0.3 {RATIO} (ref 1–1.7)
ALP SERPL-CCNC: 82 U/L (ref 46–116)
ALT SERPL-CCNC: 12 U/L (ref 14–59)
ANION GAP SERPL CALC-SCNC: 4 MMOL/L (ref 6–14)
AST SERPL-CCNC: 12 U/L (ref 15–37)
BASOPHILS # BLD AUTO: 0 X10^3/UL (ref 0–0.2)
BASOPHILS # BLD AUTO: 0 X10^3/UL (ref 0–0.2)
BASOPHILS NFR BLD: 0 % (ref 0–3)
BASOPHILS NFR BLD: 1 % (ref 0–3)
BILIRUB SERPL-MCNC: 0.3 MG/DL (ref 0.2–1)
BUN SERPL-MCNC: 9 MG/DL (ref 7–20)
BUN/CREAT SERPL: 18 (ref 6–20)
CALCIUM SERPL-MCNC: 10.9 MG/DL (ref 8.5–10.1)
CHLORIDE SERPL-SCNC: 108 MMOL/L (ref 98–107)
CO2 SERPL-SCNC: 28 MMOL/L (ref 21–32)
CREAT SERPL-MCNC: 0.5 MG/DL (ref 0.6–1)
EOSINOPHIL NFR BLD: 0.2 X10^3/UL (ref 0–0.7)
EOSINOPHIL NFR BLD: 0.3 X10^3/UL (ref 0–0.7)
EOSINOPHIL NFR BLD: 3 % (ref 0–3)
EOSINOPHIL NFR BLD: 4 % (ref 0–3)
ERYTHROCYTE [DISTWIDTH] IN BLOOD BY AUTOMATED COUNT: 18.2 % (ref 11.5–14.5)
ERYTHROCYTE [DISTWIDTH] IN BLOOD BY AUTOMATED COUNT: 18.3 % (ref 11.5–14.5)
GFR SERPLBLD BASED ON 1.73 SQ M-ARVRAT: 131.1 ML/MIN
GLOBULIN SER-MCNC: 3.8 G/DL (ref 2.2–3.8)
GLUCOSE SERPL-MCNC: 114 MG/DL (ref 70–99)
HCT VFR BLD CALC: 18.3 % (ref 36–47)
HCT VFR BLD CALC: 22.9 % (ref 36–47)
HGB BLD-MCNC: 5.9 G/DL (ref 12–15.5)
HGB BLD-MCNC: 7.3 G/DL (ref 12–15.5)
LYMPHOCYTES # BLD: 1.1 X10^3/UL (ref 1–4.8)
LYMPHOCYTES # BLD: 1.4 X10^3/UL (ref 1–4.8)
LYMPHOCYTES NFR BLD AUTO: 16 % (ref 24–48)
LYMPHOCYTES NFR BLD AUTO: 17 % (ref 24–48)
MCH RBC QN AUTO: 27 PG (ref 25–35)
MCH RBC QN AUTO: 28 PG (ref 25–35)
MCHC RBC AUTO-ENTMCNC: 32 G/DL (ref 31–37)
MCHC RBC AUTO-ENTMCNC: 32 G/DL (ref 31–37)
MCV RBC AUTO: 86 FL (ref 79–100)
MCV RBC AUTO: 88 FL (ref 79–100)
MONO #: 0.5 X10^3/UL (ref 0–1.1)
MONO #: 0.7 X10^3/UL (ref 0–1.1)
MONOCYTES NFR BLD: 8 % (ref 0–9)
MONOCYTES NFR BLD: 8 % (ref 0–9)
NEUT #: 5.1 X10^3/UL (ref 1.8–7.7)
NEUT #: 5.8 X10^3/UL (ref 1.8–7.7)
NEUTROPHILS NFR BLD AUTO: 71 % (ref 31–73)
NEUTROPHILS NFR BLD AUTO: 73 % (ref 31–73)
PLATELET # BLD AUTO: 465 X10^3/UL (ref 140–400)
PLATELET # BLD AUTO: 593 X10^3/UL (ref 140–400)
POTASSIUM SERPL-SCNC: 4.1 MMOL/L (ref 3.5–5.1)
PROT SERPL-MCNC: 5.1 G/DL (ref 6.4–8.2)
RBC # BLD AUTO: 2.09 X10^6/UL (ref 3.5–5.4)
RBC # BLD AUTO: 2.67 X10^6/UL (ref 3.5–5.4)
SODIUM SERPL-SCNC: 140 MMOL/L (ref 136–145)
WBC # BLD AUTO: 7 X10^3/UL (ref 4–11)
WBC # BLD AUTO: 8.2 X10^3/UL (ref 4–11)

## 2020-08-05 RX ADMIN — ACETYLCYSTEINE SCH MG: 200 INHALANT RESPIRATORY (INHALATION) at 08:12

## 2020-08-05 RX ADMIN — ACETYLCYSTEINE SCH MG: 200 INHALANT RESPIRATORY (INHALATION) at 20:34

## 2020-08-05 RX ADMIN — INSULIN LISPRO SCH UNITS: 100 INJECTION, SOLUTION INTRAVENOUS; SUBCUTANEOUS at 12:00

## 2020-08-05 RX ADMIN — IPRATROPIUM BROMIDE AND ALBUTEROL SULFATE SCH ML: .5; 3 SOLUTION RESPIRATORY (INHALATION) at 02:34

## 2020-08-05 RX ADMIN — INSULIN LISPRO SCH UNITS: 100 INJECTION, SOLUTION INTRAVENOUS; SUBCUTANEOUS at 18:00

## 2020-08-05 RX ADMIN — ONDANSETRON PRN MG: 2 INJECTION INTRAMUSCULAR; INTRAVENOUS at 09:26

## 2020-08-05 RX ADMIN — DEXTRAN 70, GLYCERIN, HYPROMELLOSE PRN DROP: 1; 2; 3 SOLUTION/ DROPS OPHTHALMIC at 09:26

## 2020-08-05 RX ADMIN — Medication PRN APP: at 23:33

## 2020-08-05 RX ADMIN — INSULIN LISPRO SCH UNITS: 100 INJECTION, SOLUTION INTRAVENOUS; SUBCUTANEOUS at 06:00

## 2020-08-05 RX ADMIN — PROCHLORPERAZINE EDISYLATE PRN MG: 5 INJECTION INTRAMUSCULAR; INTRAVENOUS at 22:43

## 2020-08-05 RX ADMIN — IPRATROPIUM BROMIDE AND ALBUTEROL SULFATE SCH ML: .5; 3 SOLUTION RESPIRATORY (INHALATION) at 12:17

## 2020-08-05 RX ADMIN — IPRATROPIUM BROMIDE AND ALBUTEROL SULFATE SCH ML: .5; 3 SOLUTION RESPIRATORY (INHALATION) at 08:11

## 2020-08-05 RX ADMIN — ENOXAPARIN SODIUM SCH MG: 40 INJECTION SUBCUTANEOUS at 09:26

## 2020-08-05 RX ADMIN — CEFTAZIDIME, AVIBACTAM SCH MLS/HR: 2; .5 POWDER, FOR SOLUTION INTRAVENOUS at 05:38

## 2020-08-05 RX ADMIN — INSULIN LISPRO SCH UNITS: 100 INJECTION, SOLUTION INTRAVENOUS; SUBCUTANEOUS at 00:00

## 2020-08-05 RX ADMIN — PANTOPRAZOLE SODIUM SCH MG: 40 INJECTION, POWDER, FOR SOLUTION INTRAVENOUS at 09:26

## 2020-08-05 RX ADMIN — IPRATROPIUM BROMIDE AND ALBUTEROL SULFATE SCH ML: .5; 3 SOLUTION RESPIRATORY (INHALATION) at 16:01

## 2020-08-05 RX ADMIN — IPRATROPIUM BROMIDE AND ALBUTEROL SULFATE SCH ML: .5; 3 SOLUTION RESPIRATORY (INHALATION) at 20:34

## 2020-08-05 RX ADMIN — MULTIVITAMIN SCH ML: LIQUID ORAL at 09:27

## 2020-08-05 NOTE — PDOC
SURGICAL PROGRESS NOTE


DATE: 8/5/20 


TIME: 11:33


Subjective


Pt alert and interactive, santosh tube feeds.


Vital Signs





Vital Signs








  Date Time  Temp Pulse Resp B/P (MAP) Pulse Ox O2 Delivery O2 Flow Rate FiO2


 


8/5/20 11:00 97.3 115 18 127/79 (95) 96 Room Air  





 97.3       


 


8/4/20 09:32       2.0 








I&O











Intake and Output 


 


 8/5/20





 07:00


 


Intake Total 1223 ml


 


Output Total 2440 ml


 


Balance -1217 ml


 


 


 


IV Total 250 ml


 


Tube Feeding 713 ml


 


Blood Product IV Normal Saline Flush 40 ml


 


Other 220 ml


 


Output Urine Total 1040 ml


 


Drainage Total 1400 ml


 


# Bowel Movements 1








General:  Alert, Cooperative, No acute distress


Abdomen:  Soft, No tenderness


Labs





Laboratory Tests








Test


 8/3/20


12:07 8/3/20


18:03 8/3/20


23:18 8/4/20


06:08


 


Glucose (Fingerstick)


 113 mg/dL


(70-99) 131 mg/dL


(70-99) 107 mg/dL


(70-99) 130 mg/dL


(70-99)


 


Test


 8/4/20


12:16 8/4/20


18:27 8/4/20


23:37 8/5/20


05:51


 


Glucose (Fingerstick)


 137 mg/dL


(70-99) 111 mg/dL


(70-99) 102 mg/dL


(70-99) 114 mg/dL


(70-99)


 


Test


 8/5/20


06:00 8/5/20


06:40 


 





 


White Blood Count


 7.0 x10^3/uL


(4.0-11.0) 8.2 x10^3/uL


(4.0-11.0) 


 





 


Red Blood Count


 2.09 x10^6/uL


(3.50-5.40) 2.67 x10^6/uL


(3.50-5.40) 


 





 


Hemoglobin


 5.9 g/dL


(12.0-15.5) 7.3 g/dL


(12.0-15.5) 


 





 


Hematocrit


 18.3 %


(36.0-47.0) 22.9 %


(36.0-47.0) 


 





 


Mean Corpuscular Volume 88 fL ()  86 fL ()   


 


Mean Corpuscular Hemoglobin 28 pg (25-35)  27 pg (25-35)   


 


Mean Corpuscular Hemoglobin


Concent 32 g/dL


(31-37) 32 g/dL


(31-37) 


 





 


Red Cell Distribution Width


 18.3 %


(11.5-14.5) 18.2 %


(11.5-14.5) 


 





 


Platelet Count


 465 x10^3/uL


(140-400) 593 x10^3/uL


(140-400) 


 





 


Neutrophils (%) (Auto) 73 % (31-73)  71 % (31-73)   


 


Lymphocytes (%) (Auto) 16 % (24-48)  17 % (24-48)   


 


Monocytes (%) (Auto) 8 % (0-9)  8 % (0-9)   


 


Eosinophils (%) (Auto) 3 % (0-3)  4 % (0-3)   


 


Basophils (%) (Auto) 0 % (0-3)  1 % (0-3)   


 


Neutrophils # (Auto)


 5.1 x10^3/uL


(1.8-7.7) 5.8 x10^3/uL


(1.8-7.7) 


 





 


Lymphocytes # (Auto)


 1.1 x10^3/uL


(1.0-4.8) 1.4 x10^3/uL


(1.0-4.8) 


 





 


Monocytes # (Auto)


 0.5 x10^3/uL


(0.0-1.1) 0.7 x10^3/uL


(0.0-1.1) 


 





 


Eosinophils # (Auto)


 0.2 x10^3/uL


(0.0-0.7) 0.3 x10^3/uL


(0.0-0.7) 


 





 


Basophils # (Auto)


 0.0 x10^3/uL


(0.0-0.2) 0.0 x10^3/uL


(0.0-0.2) 


 





 


Sodium Level


 


 140 mmol/L


(136-145) 


 





 


Potassium Level


 


 4.1 mmol/L


(3.5-5.1) 


 





 


Chloride Level


 


 108 mmol/L


() 


 





 


Carbon Dioxide Level


 


 28 mmol/L


(21-32) 


 





 


Anion Gap  4 (6-14)   


 


Blood Urea Nitrogen  9 mg/dL (7-20)   


 


Creatinine


 


 0.5 mg/dL


(0.6-1.0) 


 





 


Estimated GFR


(Cockcroft-Gault) 


 131.1 


 


 





 


BUN/Creatinine Ratio  18 (6-20)   


 


Glucose Level


 


 114 mg/dL


(70-99) 


 





 


Calcium Level


 


 10.9 mg/dL


(8.5-10.1) 


 





 


Total Bilirubin


 


 0.3 mg/dL


(0.2-1.0) 


 





 


Aspartate Amino Transf


(AST/SGOT) 


 12 U/L (15-37) 


 


 





 


Alanine Aminotransferase


(ALT/SGPT) 


 12 U/L (14-59) 


 


 





 


Alkaline Phosphatase


 


 82 U/L


() 


 





 


Total Protein


 


 5.1 g/dL


(6.4-8.2) 


 





 


Albumin


 


 1.3 g/dL


(3.4-5.0) 


 





 


Albumin/Globulin Ratio  0.3 (1.0-1.7)   








Laboratory Tests








Test


 8/4/20


12:16 8/4/20


18:27 8/4/20


23:37 8/5/20


05:51


 


Glucose (Fingerstick)


 137 mg/dL


(70-99) 111 mg/dL


(70-99) 102 mg/dL


(70-99) 114 mg/dL


(70-99)


 


Test


 8/5/20


06:00 8/5/20


06:40 


 





 


White Blood Count


 7.0 x10^3/uL


(4.0-11.0) 8.2 x10^3/uL


(4.0-11.0) 


 





 


Red Blood Count


 2.09 x10^6/uL


(3.50-5.40) 2.67 x10^6/uL


(3.50-5.40) 


 





 


Hemoglobin


 5.9 g/dL


(12.0-15.5) 7.3 g/dL


(12.0-15.5) 


 





 


Hematocrit


 18.3 %


(36.0-47.0) 22.9 %


(36.0-47.0) 


 





 


Mean Corpuscular Volume 88 fL ()  86 fL ()   


 


Mean Corpuscular Hemoglobin 28 pg (25-35)  27 pg (25-35)   


 


Mean Corpuscular Hemoglobin


Concent 32 g/dL


(31-37) 32 g/dL


(31-37) 


 





 


Red Cell Distribution Width


 18.3 %


(11.5-14.5) 18.2 %


(11.5-14.5) 


 





 


Platelet Count


 465 x10^3/uL


(140-400) 593 x10^3/uL


(140-400) 


 





 


Neutrophils (%) (Auto) 73 % (31-73)  71 % (31-73)   


 


Lymphocytes (%) (Auto) 16 % (24-48)  17 % (24-48)   


 


Monocytes (%) (Auto) 8 % (0-9)  8 % (0-9)   


 


Eosinophils (%) (Auto) 3 % (0-3)  4 % (0-3)   


 


Basophils (%) (Auto) 0 % (0-3)  1 % (0-3)   


 


Neutrophils # (Auto)


 5.1 x10^3/uL


(1.8-7.7) 5.8 x10^3/uL


(1.8-7.7) 


 





 


Lymphocytes # (Auto)


 1.1 x10^3/uL


(1.0-4.8) 1.4 x10^3/uL


(1.0-4.8) 


 





 


Monocytes # (Auto)


 0.5 x10^3/uL


(0.0-1.1) 0.7 x10^3/uL


(0.0-1.1) 


 





 


Eosinophils # (Auto)


 0.2 x10^3/uL


(0.0-0.7) 0.3 x10^3/uL


(0.0-0.7) 


 





 


Basophils # (Auto)


 0.0 x10^3/uL


(0.0-0.2) 0.0 x10^3/uL


(0.0-0.2) 


 





 


Sodium Level


 


 140 mmol/L


(136-145) 


 





 


Potassium Level


 


 4.1 mmol/L


(3.5-5.1) 


 





 


Chloride Level


 


 108 mmol/L


() 


 





 


Carbon Dioxide Level


 


 28 mmol/L


(21-32) 


 





 


Anion Gap  4 (6-14)   


 


Blood Urea Nitrogen  9 mg/dL (7-20)   


 


Creatinine


 


 0.5 mg/dL


(0.6-1.0) 


 





 


Estimated GFR


(Cockcroft-Gault) 


 131.1 


 


 





 


BUN/Creatinine Ratio  18 (6-20)   


 


Glucose Level


 


 114 mg/dL


(70-99) 


 





 


Calcium Level


 


 10.9 mg/dL


(8.5-10.1) 


 





 


Total Bilirubin


 


 0.3 mg/dL


(0.2-1.0) 


 





 


Aspartate Amino Transf


(AST/SGOT) 


 12 U/L (15-37) 


 


 





 


Alanine Aminotransferase


(ALT/SGPT) 


 12 U/L (14-59) 


 


 





 


Alkaline Phosphatase


 


 82 U/L


() 


 





 


Total Protein


 


 5.1 g/dL


(6.4-8.2) 


 





 


Albumin


 


 1.3 g/dL


(3.4-5.0) 


 





 


Albumin/Globulin Ratio  0.3 (1.0-1.7)   








Problem List


Problems


Medical Problems:


(1) Acute pancreatitis


Status: Acute  





(2) Cholelithiasis


Status: Acute  








Assessment/Plan


severe pancreatitis


cont supportive care and tube feeds.





Justicifation of Admission Dx:


Justifications for Admission:


Justification of Admission Dx:  Yes











GAMAL ZHOU MD              Aug 5, 2020 11:35

## 2020-08-05 NOTE — NUR
SS following up with discharge planning. SS reviewed pt chart and discussed with pt RN. Pt 
is currently on room air. IV antibiotics discontinued. Pt has ostomy bags over drainage 
sites. Pt on tube feeds (Osmolyte). GJ tube. Pt wearing speaking valve intermittently. Pt 
max assist with PT/OT and staff. SS discussed with [facility]. Yeimy from [facility] 
reported that they need authorization form signed by pt's daughters, copy of sons photo ID, 
and information on pt's stocks. SS e-mailed pt's daughters requesting information. Pt's 
daughter Glenna contacted  and we discussed needed information and discharge planning. 
Pt's daughter reported that she would send SS needed information tonight. She reported that 
as of right now resources at home are limited and her ultimate goal is for pt to be Medicaid 
pending in LTC facility. Pt's daughter reported that they have been spending down on pt's 
bills and reported that she would send SS documentation of what they have spent. APS Hotline 
report made due to difficulties with obtaining information from pt's daughters and concern 
for pt's safety if returning to home due to lack of resources, intake# [id number]. SS will 
continue to follow for discharge planning.

## 2020-08-05 NOTE — PDOC
Infectious Disease Note


Subjective


Subjective


awake, says feeling ok, trach





ROS


ROS


no n/v/d/





Vital Sign


Vital Signs





Vital Signs








  Date Time  Temp Pulse Resp B/P (MAP) Pulse Ox O2 Delivery O2 Flow Rate FiO2


 


8/5/20 07:00 97.7 106 18 128/88 (101) 95 Room Air  





 97.7       


 


8/4/20 09:32       2.0 











Physical Exam


PHYSICAL EXAM


GENERAL: pt in bed, appears weak -comfortable -alert


HEENT: Pupils equal, oral cavity dry. OC/Op - Dry


NECK:  Tracheostomy - no JVD


LUNGS: Diminished aeration bases,


HEART:  S1, S2, 


ABDOMEN: Less Distended mild- bowel sounds hypoactive, soft, richardson x 2, ROBERT 

drains, G-J tube. Some drainage around R quad tube again and mild insertion site

irritation


: [place] in place 


EXTREMITIES: Trace generalized edema, no cyanosis. Yeast in groin area


SKIN: warm touch. No signs of rash.  


LUE- Previous PICC site without signs of complications. PIV s clean


NEURO: - Alert and responsive


PICC RUE - clean


EC fistula





Labs


Lab





Laboratory Tests








Test


 8/4/20


12:16 8/4/20


18:27 8/4/20


23:37 8/5/20


05:51


 


Glucose (Fingerstick)


 137 mg/dL


(70-99) 111 mg/dL


(70-99) 102 mg/dL


(70-99) 114 mg/dL


(70-99)


 


Test


 8/5/20


06:00 8/5/20


06:40 


 





 


White Blood Count


 7.0 x10^3/uL


(4.0-11.0) 8.2 x10^3/uL


(4.0-11.0) 


 





 


Red Blood Count


 2.09 x10^6/uL


(3.50-5.40) 2.67 x10^6/uL


(3.50-5.40) 


 





 


Hemoglobin


 5.9 g/dL


(12.0-15.5) 7.3 g/dL


(12.0-15.5) 


 





 


Hematocrit


 18.3 %


(36.0-47.0) 22.9 %


(36.0-47.0) 


 





 


Mean Corpuscular Volume 88 fL ()  86 fL ()   


 


Mean Corpuscular Hemoglobin 28 pg (25-35)  27 pg (25-35)   


 


Mean Corpuscular Hemoglobin


Concent 32 g/dL


(31-37) 32 g/dL


(31-37) 


 





 


Red Cell Distribution Width


 18.3 %


(11.5-14.5) 18.2 %


(11.5-14.5) 


 





 


Platelet Count


 465 x10^3/uL


(140-400) 593 x10^3/uL


(140-400) 


 





 


Neutrophils (%) (Auto) 73 % (31-73)  71 % (31-73)   


 


Lymphocytes (%) (Auto) 16 % (24-48)  17 % (24-48)   


 


Monocytes (%) (Auto) 8 % (0-9)  8 % (0-9)   


 


Eosinophils (%) (Auto) 3 % (0-3)  4 % (0-3)   


 


Basophils (%) (Auto) 0 % (0-3)  1 % (0-3)   


 


Neutrophils # (Auto)


 5.1 x10^3/uL


(1.8-7.7) 5.8 x10^3/uL


(1.8-7.7) 


 





 


Lymphocytes # (Auto)


 1.1 x10^3/uL


(1.0-4.8) 1.4 x10^3/uL


(1.0-4.8) 


 





 


Monocytes # (Auto)


 0.5 x10^3/uL


(0.0-1.1) 0.7 x10^3/uL


(0.0-1.1) 


 





 


Eosinophils # (Auto)


 0.2 x10^3/uL


(0.0-0.7) 0.3 x10^3/uL


(0.0-0.7) 


 





 


Basophils # (Auto)


 0.0 x10^3/uL


(0.0-0.2) 0.0 x10^3/uL


(0.0-0.2) 


 





 


Sodium Level


 


 140 mmol/L


(136-145) 


 





 


Potassium Level


 


 4.1 mmol/L


(3.5-5.1) 


 





 


Chloride Level


 


 108 mmol/L


() 


 





 


Carbon Dioxide Level


 


 28 mmol/L


(21-32) 


 





 


Anion Gap  4 (6-14)   


 


Blood Urea Nitrogen  9 mg/dL (7-20)   


 


Creatinine


 


 0.5 mg/dL


(0.6-1.0) 


 





 


Estimated GFR


(Cockcroft-Gault) 


 131.1 


 


 





 


BUN/Creatinine Ratio  18 (6-20)   


 


Glucose Level


 


 114 mg/dL


(70-99) 


 





 


Calcium Level


 


 10.9 mg/dL


(8.5-10.1) 


 





 


Total Bilirubin


 


 0.3 mg/dL


(0.2-1.0) 


 





 


Aspartate Amino Transf


(AST/SGOT) 


 12 U/L (15-37) 


 


 





 


Alanine Aminotransferase


(ALT/SGPT) 


 12 U/L (14-59) 


 


 





 


Alkaline Phosphatase


 


 82 U/L


() 


 





 


Total Protein


 


 5.1 g/dL


(6.4-8.2) 


 





 


Albumin


 


 1.3 g/dL


(3.4-5.0) 


 





 


Albumin/Globulin Ratio  0.3 (1.0-1.7)   








Micro





BC neg





Objective


Assessment


Patient with prolonged hospitalization more than 4 months


Multiple medical problems


Multiple surgical procedures





URINE with parapsilosis


S/P Exp. 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 

parapsilosis & PSAE (I-merrem/ceftazidime/AZT/cefepime))


Leukocytosis - better


Loose stool but on tube feed - WBC down and no gross fever


Fever - 7/25 c-diff neg


Anemia


Acute gallstone pancreatitis with persistent necrosis


  - 4/9.  CT A/P Increased ascites. Persistent evidence of necrotizing 

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


  - 4/27. status post ROBERT drain placement; C. parapsilosis. s/p drain 5/6 + yeast

& high amylase; s/p additional drain on 5/8. Drains removed. 


  -5/6. fluid  candida parapsilosis fluid, amylase high


  - 6/6 showed multiple pseudocysts, slight larger on the right. s/p drains x 3,

6/7.  + PSAE (MDRO-R Cefepime, Zosyn ANSON < 64) and yeast, 


  -6/7 s/p drain replacement x 3; fluid cult PSAE (MDRO), yeast; treated


  -7/12 CT A/P shows smaller fluid collections.  


  -722 CT abdomen and pelvis drains in place       


Ascites s/p paracentesis 4/15 & 5/6. C. parapsilosis 


Cholelithiasis with thickening of the gallbladder wall.


JED, Hyperkalemia, Metabolic acidosis off dialysis


Acute hypoxic resp failure. trach/vent. sputum 6/13  + PSAE (I merrem) ; sputum 

culture July 19+ for PSAE R Merrem, sensitive to cefepime


Pleural effusion status post CTS left side


 Abdominal fluid culture 6 /7 MDRO Pseudomonas, yeast


Sputum culture positive 7/19 for MDRO Pseudomonas


Chest tube fluid positive for 7/21 Candida Parapsilosis


EC fistula





Plan


Plan of Care








[place] changed 7/27


d/c antibiotics


Nystatin to groin


UA and urine culture Blood culture/Cath tip neg - neg


C. difficile negative


Monitor WBC/temp 


Wound care /drain management as directed


Contact isolation for CRE/MDRO








Long term prognosis  poor





D/w nursing











LINN FRANZ MD                Aug 5, 2020 08:19

## 2020-08-05 NOTE — NUR
Wound Care:



Patient seen per wound care follow up regarding appliance change to right abdomen. Bag 
removed and skin prepped for change. Bag applied and good seal maintained. Patient has 
previous drains that are now removed to the midline lower abdomen and the left abdomen 
(distal) which are cleansed and redressed with Xeroform gauze, gauze, and tape. The left 
abdomen with a current drain in place has created redness and pressure, a hydrocolloid 
applied. These dressing to be changed every other day and the bag to be checked and changed 
weekly or PRN if needed due to drainage and leaking. No other wounds noted. Patient 
repositioned in bed and call light in reach. Bilateral heels elevated. Bed lowered. Wound 
care will continue to follow patient regarding wound care.

## 2020-08-05 NOTE — PDOC
[facility] PROGRESS NOTE


Date of Service


DOS:


DATE: 8/5/20 


TIME: 14:26





Chief Complaint


Chief Complaint


A/P:


S/P Exp. 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 

parapsilosis & PSAE (I-merrem/ceftazidime/AZT/cefepime))


Leucocytosis -stable


[id number] last night


Acute gallstone pancreatitis with persistent necrosis


Acute hypoxic Respiratory failure required mechanical ventilation


Tracheostomy Wednesday.


bilateral pleural effusions/pulm edema s/p Throacentesis on 6/15/2020


Severe Acute gallstone pancreatitis (not a surgical candidate at this time) with

necrosis


Acute kidney failure now requiring dialysis


Gallstones (Calculus of gallbladder with acute cholecystitis without 

obstruction)


HTN 


Intractable pain


Intractable nausea


Covid 19 negative. 


Acute on chronic anemia 


EEG: No seizure activity


Fever  - intermittent 


? Ileus with vomiting


Abd distention - U/S and CT reviewed s/p 0.4 L of opaque, debris-containing 

ascites was removed 5/6


Acute pancreatitis with persistent necrosis


Gallstone pancreatitis with necrosis. 


   -CT A/P 6/6 showed multiple pseudocysts, slight larger on the right. s/p 

drains x 3, 6/7.  + PSAE (MDRO-R Cefepime, Zosyn ANSON < 64) and yeast, 


   -s/p drain 4/27. C. parapsilosis. s/p drain 5/6 + yeast & high amylase; s/p 

additional drain on 5/8. Drains removed. 


Ascites s/p paracentesis 4/15 & 5/6. C. parapsilosis 


JED. off HD. 


A large fluid collection in the pancreatic bed has slightly decreased in size, 

described below, the pancreas itself is difficult to  visualize, which could be 

due to necrosis or obscuration of pancreatic  parenchyma from the surrounding 

fluid collection.6/15 


- 4/27 status post ROBERT drain placement + C paropsilosis. s/p additional drains 

5/8


Anemia - S/p PRBCs. 


Cholelithiasis with thickening of the gallbladder wall.


Leucocytosis improving


JED, hyperkalemia, Metabolic acidosis off dialysis


hypocalcemia 


Prediabetes


HTN


s/p trach


Hyperglycemia


severe protein-caloric malnutrition


Moderate to large left pleural effusion with atelectasis and collapse  of most 

of the left lower lobe, stable


Extensive retroperitoneal fluid collections persist. Percutaneous  drains remain

within the collections in both paracolic gutters. These  communicate with 

additional pelvic and peripancreatic collections.





History of Present Illness


History of Present Illness


Hemoglobin abnormally low on repeat has been stable 7.2.  Calcium up to 10.9.  

She is able to work with PT, she is asking to eat.  Social work is been having 

difficulties with both of her daughters completing the financial aspect of 

disability paperwork which is been prolonging her stay over the past 5 months.  

Plan to check PTH and to treat hypercalcemia with 1 L normal saline 40 mg of IV 

Lasix and repeat labs in a.m.





8/4: Not wishing to wear her speaking valve. J tube having some difficulties. 

Afebrile. Rolf bedside to aid her. transferred from ICU today


8/3: No overnight events. Calcium 10.7 on AM labs. She is still a bit slow to to

respond, but follows commands well. Does not want speaking valve with me. Pain 

is better controlled in her abdomen. Wound care to see today. Will check liver 

function and phos levels given her calcium elevation.





8/2/2020


Patient seen and examined bedside.  Positive fluid balance in the past 24 hours.

 Patient's chart, labs, images were reviewed and discussed with RN





8/1/2020


No acute events overnight.  Seen and examined at bedside.  Patient had a fever 

100.2.  Negative fluid balance of 150 cc.  Patient's chart, labs, images were 

reviewed and discussed with RN





7/31/2020


Patient seen and examined at bedside.  No acute events overnight.  Positive 

fluid balance 633.  Patient's chart, labs, images were reviewed and discussed 

with RN








7/30/2020


Patient seen and examined bedside.  No acute events overnight.  Patient's chart,

labs, images were reviewed and discussed with RN








7/28/2020


Patient seen and examined bedside.  Patient appears to be in no obvious pain.  

All drains have been adequately draining.  Patient continues to be on TPN.  

Chart labs and imaging was reviewed.  Negative fluid balance of 270 cc


7/27/2020


Patient seen and examined in the ICU.  Patient still requires extensive care.  

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  Chart, labs, imaging was 

reviewed.  UOP with + 500cc balance.








7/25 /2020





Patient seen in and examined in the ICU


She is still extremely critically ill


Appears weak, frail, and pale


Better color today with improved eye contact and expression


Discussed with RN


Chart reviewed











 








7/21/2020


Patient seen and examined in the ICU


She is still extremely critically ill


Appears extremely weak frail and pale


Discussed with RN


Chart reviewed





Vitals/I&O


Vitals/I&O:





                                   Vital Signs








  Date Time  Temp Pulse Resp B/P (MAP) Pulse Ox O2 Delivery O2 Flow Rate FiO2


 


8/5/20 12:22     96 Room Air  


 


8/5/20 11:00 97.3 115 18 127/79 (95)    





 97.3       


 


8/5/20 08:00       2.0 














                                    I & O   


 


 8/4/20 8/4/20 8/5/20





 15:00 23:00 07:00


 


Intake Total 385 ml 100 ml 738 ml


 


Output Total 150 ml 1350 ml 940 ml


 


Balance 235 ml -1250 ml -202 ml











Physical Exam


Physical Exam:


GENERAL: pt in bed, appears weak -comfortable -alert


HEENT: Pupils equal, oral cavity dry. OC/Op - Dry


NECK:  Tracheostomy - no JVD


LUNGS: Diminished aeration bases,


HEART:  S1, S2, 


ABDOMEN: Less Distended mild- bowel sounds hypoactive, soft, richardson x 2, ROBERT 

drains, G-J tube. Some drainage around R quad tube again and mild insertion site

irritation


: [place] in place 


EXTREMITIES: Trace generalized edema, no cyanosis. Yeast in groin area


SKIN: warm touch. No signs of rash.  


LUE- Previous PICC site without signs of complications. PIV s clean


NEURO: - Alert and responsive


PICC RUE - clean


EC fistula


General:  Alert, Cooperative, No acute distress


Heart:  Other (distant heart sounds, tachycardic)


Lungs:  Clear


Abdomen:  Soft, No tenderness


Extremities:  Other (Diffuse edema)


Skin:  No rashes, No significant lesion





Labs


Labs:





Laboratory Tests








Test


 8/4/20


18:27 8/4/20


23:37 8/5/20


05:51 8/5/20


06:00


 


Glucose (Fingerstick)


 111 mg/dL


(70-99) 102 mg/dL


(70-99) 114 mg/dL


(70-99) 





 


White Blood Count


 


 


 


 7.0 x10^3/uL


(4.0-11.0)


 


Red Blood Count


 


 


 


 2.09 x10^6/uL


(3.50-5.40)


 


Hemoglobin


 


 


 


 5.9 g/dL


(12.0-15.5)


 


Hematocrit


 


 


 


 18.3 %


(36.0-47.0)


 


Mean Corpuscular Volume    88 fL () 


 


Mean Corpuscular Hemoglobin    28 pg (25-35) 


 


Mean Corpuscular Hemoglobin


Concent 


 


 


 32 g/dL


(31-37)


 


Red Cell Distribution Width


 


 


 


 18.3 %


(11.5-14.5)


 


Platelet Count


 


 


 


 465 x10^3/uL


(140-400)


 


Neutrophils (%) (Auto)    73 % (31-73) 


 


Lymphocytes (%) (Auto)    16 % (24-48) 


 


Monocytes (%) (Auto)    8 % (0-9) 


 


Eosinophils (%) (Auto)    3 % (0-3) 


 


Basophils (%) (Auto)    0 % (0-3) 


 


Neutrophils # (Auto)


 


 


 


 5.1 x10^3/uL


(1.8-7.7)


 


Lymphocytes # (Auto)


 


 


 


 1.1 x10^3/uL


(1.0-4.8)


 


Monocytes # (Auto)


 


 


 


 0.5 x10^3/uL


(0.0-1.1)


 


Eosinophils # (Auto)


 


 


 


 0.2 x10^3/uL


(0.0-0.7)


 


Basophils # (Auto)


 


 


 


 0.0 x10^3/uL


(0.0-0.2)


 


Test


 8/5/20


06:40 8/5/20


12:06 


 





 


White Blood Count


 8.2 x10^3/uL


(4.0-11.0) 


 


 





 


Red Blood Count


 2.67 x10^6/uL


(3.50-5.40) 


 


 





 


Hemoglobin


 7.3 g/dL


(12.0-15.5) 


 


 





 


Hematocrit


 22.9 %


(36.0-47.0) 


 


 





 


Mean Corpuscular Volume 86 fL ()    


 


Mean Corpuscular Hemoglobin 27 pg (25-35)    


 


Mean Corpuscular Hemoglobin


Concent 32 g/dL


(31-37) 


 


 





 


Red Cell Distribution Width


 18.2 %


(11.5-14.5) 


 


 





 


Platelet Count


 593 x10^3/uL


(140-400) 


 


 





 


Neutrophils (%) (Auto) 71 % (31-73)    


 


Lymphocytes (%) (Auto) 17 % (24-48)    


 


Monocytes (%) (Auto) 8 % (0-9)    


 


Eosinophils (%) (Auto) 4 % (0-3)    


 


Basophils (%) (Auto) 1 % (0-3)    


 


Neutrophils # (Auto)


 5.8 x10^3/uL


(1.8-7.7) 


 


 





 


Lymphocytes # (Auto)


 1.4 x10^3/uL


(1.0-4.8) 


 


 





 


Monocytes # (Auto)


 0.7 x10^3/uL


(0.0-1.1) 


 


 





 


Eosinophils # (Auto)


 0.3 x10^3/uL


(0.0-0.7) 


 


 





 


Basophils # (Auto)


 0.0 x10^3/uL


(0.0-0.2) 


 


 





 


Sodium Level


 140 mmol/L


(136-145) 


 


 





 


Potassium Level


 4.1 mmol/L


(3.5-5.1) 


 


 





 


Chloride Level


 108 mmol/L


() 


 


 





 


Carbon Dioxide Level


 28 mmol/L


(21-32) 


 


 





 


Anion Gap 4 (6-14)    


 


Blood Urea Nitrogen 9 mg/dL (7-20)    


 


Creatinine


 0.5 mg/dL


(0.6-1.0) 


 


 





 


Estimated GFR


(Cockcroft-Gault) 131.1 


 


 


 





 


BUN/Creatinine Ratio 18 (6-20)    


 


Glucose Level


 114 mg/dL


(70-99) 


 


 





 


Calcium Level


 10.9 mg/dL


(8.5-10.1) 


 


 





 


Total Bilirubin


 0.3 mg/dL


(0.2-1.0) 


 


 





 


Aspartate Amino Transf


(AST/SGOT) 12 U/L (15-37) 


 


 


 





 


Alanine Aminotransferase


(ALT/SGPT) 12 U/L (14-59) 


 


 


 





 


Alkaline Phosphatase


 82 U/L


() 


 


 





 


Total Protein


 5.1 g/dL


(6.4-8.2) 


 


 





 


Albumin


 1.3 g/dL


(3.4-5.0) 


 


 





 


Albumin/Globulin Ratio 0.3 (1.0-1.7)    


 


Glucose (Fingerstick)


 


 132 mg/dL


(70-99) 


 














Assessment and Plan


Assessmemt and Plan


Problems


Medical Problems:


(1) Acute pancreatitis


Status: Acute  





(2) Cholelithiasis


Status: Acute  











Comment


Review of Relevant


I have reviewed the following items josy (where applicable) has been applied.


Medications:





Current Medications








 Medications


  (Trade)  Dose


 Ordered  Sig/Yee


 Route


 PRN Reason  Start Time


 Stop Time Status Last Admin


Dose Admin


 


 Lidocaine HCl


  (Buffered


 Lidocaine 1%)  5 ml  1X  ONCE


 INJ


   8/4/20 15:45


 8/4/20 15:46 DC 8/4/20 15:00





 


 Iohexol


  (Omnipaque 240


 Mg/ml)  10 ml  1X  ONCE


 IJ


   8/4/20 15:45


 8/4/20 15:46 DC 8/4/20 15:00





 


 Multivitamins/


 Minerals


 Therapeutic


  (Centrum


 Multivit-Mineral


 Liq)  5 ml  DAILY


 PEG


   8/5/20 09:00


    8/5/20 09:27





 


 Alteplase,


 Recombinant


  (Cathflo For


 Central Catheter


 Clearance)  1 mg  1X  ONCE


 INT CAT


   8/5/20 07:00


 8/5/20 07:01 DC 8/5/20 09:30














Justicifation of Admission Dx:


Justifications for Admission:


Justification of Admission Dx:  Yes











GAETANO GONCALVES MD         Aug 5, 2020 14:31

## 2020-08-05 NOTE — PDOC
Date of Service:


DATE: 8/5/20 


TIME: 11:08





Subjective:


Subjective:


Asks when she can eat and drink.


Nurse says has c/o "stomach upset" from tube feeds.





Objective:


Vital Signs:





                                   Vital Signs








  Date Time  Temp Pulse Resp B/P (MAP) Pulse Ox O2 Delivery O2 Flow Rate FiO2


 


8/5/20 11:00 97.3 115 18 127/79 (95) 96 Room Air  





 97.3       


 


8/4/20 09:32       2.0 








Labs:





Laboratory Tests








Test


 8/4/20


12:16 8/4/20


18:27 8/4/20


23:37 8/5/20


05:51


 


Glucose (Fingerstick) 137 mg/dL  111 mg/dL  102 mg/dL  114 mg/dL 


 


Test


 8/5/20


06:00 8/5/20


06:40 


 





 


White Blood Count 7.0 x10^3/uL  8.2 x10^3/uL   


 


Red Blood Count 2.09 x10^6/uL  2.67 x10^6/uL   


 


Hemoglobin 5.9 g/dL  7.3 g/dL   


 


Hematocrit 18.3 %  22.9 %   


 


Mean Corpuscular Volume 88 fL  86 fL   


 


Mean Corpuscular Hemoglobin 28 pg  27 pg   


 


Mean Corpuscular Hemoglobin


Concent 32 g/dL 


 32 g/dL 


 


 





 


Red Cell Distribution Width 18.3 %  18.2 %   


 


Platelet Count 465 x10^3/uL  593 x10^3/uL   


 


Neutrophils (%) (Auto) 73 %  71 %   


 


Lymphocytes (%) (Auto) 16 %  17 %   


 


Monocytes (%) (Auto) 8 %  8 %   


 


Eosinophils (%) (Auto) 3 %  4 %   


 


Basophils (%) (Auto) 0 %  1 %   


 


Neutrophils # (Auto) 5.1 x10^3/uL  5.8 x10^3/uL   


 


Lymphocytes # (Auto) 1.1 x10^3/uL  1.4 x10^3/uL   


 


Monocytes # (Auto) 0.5 x10^3/uL  0.7 x10^3/uL   


 


Eosinophils # (Auto) 0.2 x10^3/uL  0.3 x10^3/uL   


 


Basophils # (Auto) 0.0 x10^3/uL  0.0 x10^3/uL   


 


Sodium Level  140 mmol/L   


 


Potassium Level  4.1 mmol/L   


 


Chloride Level  108 mmol/L   


 


Carbon Dioxide Level  28 mmol/L   


 


Anion Gap  4   


 


Blood Urea Nitrogen  9 mg/dL   


 


Creatinine  0.5 mg/dL   


 


Estimated GFR


(Cockcroft-Gault) 


 131.1 


 


 





 


BUN/Creatinine Ratio  18   


 


Glucose Level  114 mg/dL   


 


Calcium Level  10.9 mg/dL   


 


Total Bilirubin  0.3 mg/dL   


 


Aspartate Amino Transf


(AST/SGOT) 


 12 U/L 


 


 





 


Alanine Aminotransferase


(ALT/SGPT) 


 12 U/L 


 


 





 


Alkaline Phosphatase  82 U/L   


 


Total Protein  5.1 g/dL   


 


Albumin  1.3 g/dL   


 


Albumin/Globulin Ratio  0.3   











PE:





GEN: chronically ill, up in chair


HEENT: trach, speaking valve


CTAB: clear


HEART: tachcyardic


ABD: right side w/ bag/drainage, GJ tube w/ feeds


NEURO/PSYCH: A & O 3





A/P:


S/p pancreatic necrosectomy, complications





--


Supportive care.  Defer diet to surgery, SLP, etc.





Justicifation of Admission Dx:


Justifications for Admission:


Justification of Admission Dx:  Yes











RAGHAVENDRA MURCIA          Aug 5, 2020 11:11

## 2020-08-06 VITALS — SYSTOLIC BLOOD PRESSURE: 143 MMHG | DIASTOLIC BLOOD PRESSURE: 96 MMHG

## 2020-08-06 VITALS — DIASTOLIC BLOOD PRESSURE: 95 MMHG | SYSTOLIC BLOOD PRESSURE: 157 MMHG

## 2020-08-06 VITALS — DIASTOLIC BLOOD PRESSURE: 88 MMHG | SYSTOLIC BLOOD PRESSURE: 140 MMHG

## 2020-08-06 VITALS — DIASTOLIC BLOOD PRESSURE: 79 MMHG | SYSTOLIC BLOOD PRESSURE: 124 MMHG

## 2020-08-06 VITALS — SYSTOLIC BLOOD PRESSURE: 158 MMHG | DIASTOLIC BLOOD PRESSURE: 104 MMHG

## 2020-08-06 VITALS — DIASTOLIC BLOOD PRESSURE: 91 MMHG | SYSTOLIC BLOOD PRESSURE: 178 MMHG

## 2020-08-06 LAB
ANION GAP SERPL CALC-SCNC: 3 MMOL/L (ref 6–14)
BASOPHILS # BLD AUTO: 0 X10^3/UL (ref 0–0.2)
BASOPHILS NFR BLD: 1 % (ref 0–3)
BUN SERPL-MCNC: 9 MG/DL (ref 7–20)
CALCIUM PTH: 11.1 MG/DL (ref 8.7–10.2)
CALCIUM SERPL-MCNC: 11.3 MG/DL (ref 8.5–10.1)
CHLORIDE SERPL-SCNC: 107 MMOL/L (ref 98–107)
CO2 SERPL-SCNC: 29 MMOL/L (ref 21–32)
CREAT SERPL-MCNC: 0.6 MG/DL (ref 0.6–1)
CREATININE PTH: 0.29 MG/DL (ref 0.57–1)
EOSINOPHIL NFR BLD: 0.2 X10^3/UL (ref 0–0.7)
EOSINOPHIL NFR BLD: 2 % (ref 0–3)
ERYTHROCYTE [DISTWIDTH] IN BLOOD BY AUTOMATED COUNT: 17.7 % (ref 11.5–14.5)
GFR SERPLBLD BASED ON 1.73 SQ M-ARVRAT: 106.3 ML/MIN
GLUCOSE SERPL-MCNC: 144 MG/DL (ref 70–99)
HCT VFR BLD CALC: 25.1 % (ref 36–47)
HGB BLD-MCNC: 8 G/DL (ref 12–15.5)
LYMPHOCYTES # BLD: 1.3 X10^3/UL (ref 1–4.8)
LYMPHOCYTES NFR BLD AUTO: 14 % (ref 24–48)
MCH RBC QN AUTO: 28 PG (ref 25–35)
MCHC RBC AUTO-ENTMCNC: 32 G/DL (ref 31–37)
MCV RBC AUTO: 86 FL (ref 79–100)
MONO #: 0.6 X10^3/UL (ref 0–1.1)
MONOCYTES NFR BLD: 7 % (ref 0–9)
NEUT #: 7.2 X10^3/UL (ref 1.8–7.7)
NEUTROPHILS NFR BLD AUTO: 77 % (ref 31–73)
PHOSPHORUS PTH: 4.3 MG/DL (ref 3–4.3)
PLATELET # BLD AUTO: 646 X10^3/UL (ref 140–400)
POTASSIUM SERPL-SCNC: 3.7 MMOL/L (ref 3.5–5.1)
PTH-INTACT SERPL-MCNC: 2 PG/ML (ref 15–65)
RBC # BLD AUTO: 2.9 X10^6/UL (ref 3.5–5.4)
SODIUM SERPL-SCNC: 139 MMOL/L (ref 136–145)
WBC # BLD AUTO: 9.3 X10^3/UL (ref 4–11)

## 2020-08-06 RX ADMIN — ENOXAPARIN SODIUM SCH MG: 40 INJECTION SUBCUTANEOUS at 08:34

## 2020-08-06 RX ADMIN — INSULIN LISPRO SCH UNITS: 100 INJECTION, SOLUTION INTRAVENOUS; SUBCUTANEOUS at 18:00

## 2020-08-06 RX ADMIN — IPRATROPIUM BROMIDE AND ALBUTEROL SULFATE SCH ML: .5; 3 SOLUTION RESPIRATORY (INHALATION) at 08:47

## 2020-08-06 RX ADMIN — FENTANYL SCH PATCH: 12.5 PATCH TRANSDERMAL at 08:34

## 2020-08-06 RX ADMIN — IPRATROPIUM BROMIDE AND ALBUTEROL SULFATE SCH ML: .5; 3 SOLUTION RESPIRATORY (INHALATION) at 11:59

## 2020-08-06 RX ADMIN — INSULIN LISPRO SCH UNITS: 100 INJECTION, SOLUTION INTRAVENOUS; SUBCUTANEOUS at 00:00

## 2020-08-06 RX ADMIN — ONDANSETRON PRN MG: 2 INJECTION INTRAMUSCULAR; INTRAVENOUS at 18:31

## 2020-08-06 RX ADMIN — IPRATROPIUM BROMIDE AND ALBUTEROL SULFATE SCH ML: .5; 3 SOLUTION RESPIRATORY (INHALATION) at 00:42

## 2020-08-06 RX ADMIN — FUROSEMIDE SCH MG: 10 INJECTION, SOLUTION INTRAMUSCULAR; INTRAVENOUS at 13:51

## 2020-08-06 RX ADMIN — FUROSEMIDE SCH MG: 10 INJECTION, SOLUTION INTRAMUSCULAR; INTRAVENOUS at 08:33

## 2020-08-06 RX ADMIN — HYDROCODONE BITARTRATE AND ACETAMINOPHEN PRN TAB: 5; 325 TABLET ORAL at 13:50

## 2020-08-06 RX ADMIN — CALCITONIN SALMON SCH UNIT: 200 INJECTION, SOLUTION INTRAMUSCULAR; SUBCUTANEOUS at 18:18

## 2020-08-06 RX ADMIN — PANTOPRAZOLE SODIUM SCH MG: 40 INJECTION, POWDER, FOR SOLUTION INTRAVENOUS at 08:33

## 2020-08-06 RX ADMIN — ACETYLCYSTEINE SCH MG: 200 INHALANT RESPIRATORY (INHALATION) at 08:47

## 2020-08-06 RX ADMIN — ACETYLCYSTEINE SCH MG: 200 INHALANT RESPIRATORY (INHALATION) at 20:00

## 2020-08-06 RX ADMIN — MULTIVITAMIN SCH ML: LIQUID ORAL at 08:32

## 2020-08-06 RX ADMIN — IPRATROPIUM BROMIDE AND ALBUTEROL SULFATE SCH ML: .5; 3 SOLUTION RESPIRATORY (INHALATION) at 23:47

## 2020-08-06 RX ADMIN — IPRATROPIUM BROMIDE AND ALBUTEROL SULFATE SCH ML: .5; 3 SOLUTION RESPIRATORY (INHALATION) at 04:27

## 2020-08-06 RX ADMIN — IPRATROPIUM BROMIDE AND ALBUTEROL SULFATE SCH ML: .5; 3 SOLUTION RESPIRATORY (INHALATION) at 21:14

## 2020-08-06 RX ADMIN — IPRATROPIUM BROMIDE AND ALBUTEROL SULFATE SCH ML: .5; 3 SOLUTION RESPIRATORY (INHALATION) at 16:10

## 2020-08-06 RX ADMIN — INSULIN LISPRO SCH UNITS: 100 INJECTION, SOLUTION INTRAVENOUS; SUBCUTANEOUS at 12:00

## 2020-08-06 RX ADMIN — INSULIN LISPRO SCH UNITS: 100 INJECTION, SOLUTION INTRAVENOUS; SUBCUTANEOUS at 06:00

## 2020-08-06 NOTE — PDOC2
CONSULT


Date of Consult


Date of Consult


DATE: 8/6/20 


TIME: 11:04





Reason for Consult


Reason for Consult:


HyperCalcemia





Source


Source:  Chart review, Patient





History of Present Illness


Reason for Visit:


Pt is 48 yo CF has been hospitalized at [facility] since 3/16/2020 with Dx of  Acute 

gallstone pancreatitis with persistent necrosis,Acute hypoxic Respiratory 

failure required mechanical ventilation   . Renal was consulted initially for 

JED -requiring CRRT/HD . During the course of her stay, JED had resolved and she

was off RRT , and we had signed off  


We are reconsulted for HyperCalcemia  . She Recd IVF and and is on lasix per  

Primary  





Interval Hx  - S/P Exp. Lap, REN, naif, G-J tube & Pancreatic Necrosectomy on 

6/30, C. parapsilosis & PSAE (I-merrem/ceftazidime/AZT/cefepime))


Bilateral pleural effusions/pulm edema s/p Throacentesis on 6/15/2020


s/p trach





 She has been working with PT . No acute concerns or complaints voiced by Pt or 

Nursing





Past Medical History


Cardiovascular:  HTN, Hyperlipidemia


GI:  GERD


Renal/:  UTI


Endocrine:  Diabetes





Past Surgical History


Past Surgical History:  Other (recent abdominal surgery)





Family History


Family History:  High Cholestrol, Hypertension





Social History


No


ALCOHOL:  none


Drugs:  None





Current Problem List


Problem List


Problems


Medical Problems:


(1) Acute pancreatitis


Status: Acute  





(2) Cholelithiasis


Status: Acute  











Current Medications


Current Medications





Current Medications


Sodium Chloride 1,000 ml @  1,000 mls/hr Q1H IV  Last administered on 3/16/20at 

03:00;  Start 3/16/20 at 03:00;  Stop 3/16/20 at 03:59;  Status DC


Ondansetron HCl (Zofran) 4 mg 1X  ONCE IVP  Last administered on 3/16/20at 

03:27;  Start 3/16/20 at 03:00;  Stop 3/16/20 at 03:01;  Status DC


Morphine Sulfate (Morphine Sulfate) 4 mg 1X  ONCE IV ;  Start 3/16/20 at 03:00; 

Stop 3/16/20 at 03:01;  Status Cancel


Ketorolac Tromethamine (Toradol 30mg Vial) 30 mg 1X  ONCE IV  Last administered 

on 3/16/20at 02:54;  Start 3/16/20 at 03:00;  Stop 3/16/20 at 03:01;  Status DC


Fentanyl Citrate (Fentanyl 2ml Vial) 25 mcg 1X  ONCE IVP  Last administered on 

3/16/20at 03:23;  Start 3/16/20 at 03:30;  Stop 3/16/20 at 03:31;  Status DC


Fentanyl Citrate (Fentanyl 2ml Vial) 100 mcg STK-MED ONCE .ROUTE ;  Start 

3/16/20 at 03:18;  Stop 3/16/20 at 03:18;  Status DC


Iohexol (Omnipaque 350 Mg/ml) 90 ml 1X  ONCE IV  Last administered on 3/16/20at 

03:25;  Start 3/16/20 at 03:30;  Stop 3/16/20 at 03:31;  Status DC


Info (CONTRAST GIVEN -- Rx MONITORING) 1 each PRN DAILY  PRN MC SEE COMMENTS;  

Start 3/16/20 at 03:30;  Stop 3/18/20 at 03:29;  Status DC


Hydromorphone HCl (Dilaudid) 0.5 mg 1X  ONCE IV  Last administered on 3/16/20at 

03:55;  Start 3/16/20 at 04:30;  Stop 3/16/20 at 04:32;  Status DC


Ondansetron HCl (Zofran) 4 mg PRN Q8HRS  PRN IV NAUSEA/VOMITING 1ST CHOICE;  

Start 3/16/20 at 05:00;  Stop 3/16/20 at 09:27;  Status DC


Morphine Sulfate (Morphine Sulfate) 2 mg PRN Q2HR  PRN IV SEVERE PAIN 7-10 Last 

administered on 3/17/20at 12:26;  Start 3/16/20 at 05:00;  Stop 3/17/20 at 1

4:15;  Status DC


Sodium Chloride 1,000 ml @  125 mls/hr Q8H IV  Last administered on 3/16/20at 

20:56;  Start 3/16/20 at 05:00;  Stop 3/17/20 at 04:59;  Status DC


Hydromorphone HCl (Dilaudid) 0.5 mg PRN Q3HRS  PRN IV SEVERE PAIN 7-10 Last 

administered on 3/17/20at 10:06;  Start 3/16/20 at 05:00;  Stop 3/17/20 at 

12:01;  Status DC


Piperacillin Sod/ Tazobactam Sod 4.5 gm/Sodium Chloride 100 ml @  200 mls/hr 1X 

ONCE IV  Last administered on 3/16/20at 05:44;  Start 3/16/20 at 06:00;  Stop 

3/16/20 at 06:29;  Status DC


Ondansetron HCl (Zofran) 4 mg PRN Q4HRS  PRN IV NAUSEA/VOMITING 1ST CHOICE Last 

administered on 8/5/20at 09:26;  Start 3/16/20 at 09:30


Insulin Human Lispro (HumaLOG) 0-9 UNITS Q6HRS SQ  Last administered on 

7/31/20at 12:15;  Start 3/16/20 at 09:30


Dextrose (Dextrose 50%-Water Syringe) 12.5 gm PRN Q15MIN  PRN IV SEE COMMENTS;  

Start 3/16/20 at 09:30


Pantoprazole Sodium (PROTONIX VIAL for IV PUSH) 40 mg DAILYAC IVP  Last 

administered on 8/6/20at 08:33;  Start 3/16/20 at 11:30


Prochlorperazine Edisylate (Compazine) 10 mg PRN Q6HRS  PRN IV NAUSEA/VOMITING, 

2nd CHOICE Last administered on 8/5/20at 22:43;  Start 3/16/20 at 17:45


Atenolol (Tenormin) 100 mg DAILY PO ;  Start 3/17/20 at 09:00;  Stop 3/16/20 at 

20:08;  Status DC


Metoprolol Tartrate (Lopressor Vial) 2.5 mg Q6HRS IVP  Last administered on 

3/17/20at 05:51;  Start 3/16/20 at 20:15;  Stop 3/17/20 at 10:02;  Status DC


Metoprolol Tartrate (Lopressor Vial) 5 mg Q6HRS IVP  Last administered on 

3/26/20at 00:12;  Start 3/17/20 at 10:15;  Stop 3/28/20 at 08:48;  Status DC


Hydromorphone HCl (Dilaudid) 1 mg PRN Q3HRS  PRN IV SEVERE PAIN 7-10 Last 

administered on 3/23/20at 05:13;  Start 3/17/20 at 12:00;  Stop 3/31/20 at 

00:25;  Status DC


Lidocaine HCl (Buffered Lidocaine 1%) 3 ml STK-MED ONCE .ROUTE ;  Start 3/17/20 

at 12:55;  Stop 3/17/20 at 12:56;  Status DC


Albumin Human 500 ml @  125 mls/hr 1X  ONCE IV  Last administered on 3/17/20at 

14:33;  Start 3/17/20 at 14:30;  Stop 3/17/20 at 18:32;  Status DC


Norepinephrine Bitartrate 8 mg/ Dextrose 258 ml @  17.299 mls/ hr CONT  PRN IV 

PER PROTOCOL Last administered on 4/14/20at 12:48;  Start 3/17/20 at 15:30;  

Stop 4/17/20 at 09:19;  Status DC


Sodium Chloride 1,000 ml @  125 mls/hr Q8H IV  Last administered on 3/17/20at 

21:04;  Start 3/17/20 at 16:00;  Stop 3/18/20 at 02:42;  Status DC


Albumin Human 500 ml @  125 mls/hr PRN BID  PRN IV After every 2L NSS & BP < 

90mm Last administered on 6/30/20at 16:06;  Start 3/17/20 at 16:00;  Stop 7/3/20

at 09:30;  Status DC


Iohexol (Omnipaque 300 Mg/ml) 60 ml 1X  ONCE IV  Last administered on 3/17/20at 

17:20;  Start 3/17/20 at 17:00;  Stop 3/17/20 at 17:01;  Status DC


Info (CONTRAST GIVEN -- Rx MONITORING) 1 each PRN DAILY  PRN MC SEE COMMENTS;  

Start 3/17/20 at 17:00;  Stop 3/19/20 at 16:59;  Status DC


Meropenem 1 gm/ Sodium Chloride 100 ml @  200 mls/hr Q8HRS IV  Last administered

on 3/18/20at 05:45;  Start 3/17/20 at 20:00;  Stop 3/18/20 at 08:48;  Status DC


Furosemide (Lasix) 40 mg 1X  ONCE IVP  Last administered on 3/17/20at 22:12;  

Start 3/17/20 at 22:30;  Stop 3/17/20 at 22:31;  Status DC


Calcium Chloride 1000 mg/Sodium Chloride 110 ml @  220 mls/hr 1X  ONCE IV  Last 

administered on 3/17/20at 22:11;  Start 3/17/20 at 22:30;  Stop 3/17/20 at 

22:59;  Status DC


Albuterol Sulfate (Ventolin Neb Soln) 2.5 mg 1X  ONCE NEB  Last administered on 

3/18/20at 00:56;  Start 3/17/20 at 22:30;  Stop 3/17/20 at 22:31;  Status DC


Insulin Human Regular (HumuLIN R VIAL) 5 unit 1X  ONCE IV  Last administered on 

3/17/20at 22:14;  Start 3/17/20 at 22:30;  Stop 3/17/20 at 22:31;  Status DC


Magnesium Sulfate 50 ml @ 25 mls/hr 1X  ONCE IV  Last administered on 3/18/20at 

02:57;  Start 3/18/20 at 03:00;  Stop 3/18/20 at 04:59;  Status DC


Calcium Gluconate 1000 mg/Sodium Chloride 110 ml @  220 mls/hr 1X  ONCE IV  Last

administered on 3/18/20at 02:46;  Start 3/18/20 at 03:00;  Stop 3/18/20 at 

03:29;  Status DC


Sodium Chloride 1,000 ml @  200 mls/hr Q5H IV  Last administered on 3/18/20at 

02:46;  Start 3/18/20 at 03:00;  Stop 3/18/20 at 10:21;  Status DC


Calcium Gluconate 1000 mg/Sodium Chloride 110 ml @  220 mls/hr 1X  ONCE IV  Last

administered on 3/18/20at 03:21;  Start 3/18/20 at 03:30;  Stop 3/18/20 at 

03:59;  Status DC


Sodium Bicarbonate 50 meq/Sodium Chloride 1,050 ml @  75 mls/hr Q14H IV  Last 

administered on 3/22/20at 21:10;  Start 3/18/20 at 07:30;  Stop 3/23/20 at 

10:28;  Status DC


Calcium Gluconate 2000 mg/Sodium Chloride 120 ml @  220 mls/hr 1X  ONCE IV  Last

administered on 3/18/20at 09:05;  Start 3/18/20 at 07:30;  Stop 3/18/20 at 

08:02;  Status DC


Lidocaine HCl (Xylocaine-Mpf 1% 2ml Vial) 2 ml STK-MED ONCE .ROUTE ;  Start 

3/18/20 at 08:47;  Stop 3/18/20 at 08:47;  Status DC


Meropenem 500 mg/ Sodium Chloride 50 ml @  100 mls/hr Q12HR IV  Last 

administered on 3/23/20at 21:01;  Start 3/18/20 at 18:00;  Stop 3/24/20 at 

07:58;  Status DC


Lidocaine HCl (Buffered Lidocaine 1%) 3 ml STK-MED ONCE .ROUTE ;  Start 3/18/20 

at 09:46;  Stop 3/18/20 at 09:46;  Status DC


Lidocaine HCl (Buffered Lidocaine 1%) 6 ml 1X  ONCE INJ  Last administered on 

3/18/20at 10:26;  Start 3/18/20 at 10:15;  Stop 3/18/20 at 10:16;  Status DC


Info (Tpn Per Pharmacy) 1 each PRN DAILY  PRN MC SEE COMMENTS Last administered 

on 7/26/20at 08:31;  Start 3/18/20 at 12:00;  Stop 7/26/20 at 10:41;  Status DC


Sodium Chloride 1,000 ml @  1,000 mls/hr Q1H PRN IV hypotension;  Start 3/18/20 

at 12:07;  Stop 3/18/20 at 18:06;  Status DC


Diphenhydramine HCl (Benadryl) 25 mg 1X PRN  PRN IV ITCHING;  Start 3/18/20 at 

12:15;  Stop 3/19/20 at 12:14;  Status DC


Diphenhydramine HCl (Benadryl) 25 mg 1X PRN  PRN IV ITCHING;  Start 3/18/20 at 

12:15;  Stop 3/19/20 at 12:14;  Status DC


Sodium Chloride 1,000 ml @  400 mls/hr Q2H30M PRN IV PATENCY;  Start 3/18/20 at 

12:07;  Stop 3/19/20 at 00:06;  Status DC


Info (PHARMACY MONITORING -- do not chart) 1 each PRN DAILY  PRN MC SEE 

COMMENTS;  Start 3/18/20 at 12:15;  Stop 3/20/20 at 08:13;  Status DC


Sodium Chloride 90 meq/Calcium Gluconate 10 meq/ Multivitamins 10 ml/Chromium/ 

Copper/Manganese/ Seleni/Zn 1 ml/ Total Parenteral Nutrition/Amino 

Acids/Dextrose/ Fat Emulsion Intravenous 55.005 ml  @ 2.292 mls/hr TPN  CONT IV 

;  Start 3/18/20 at 22:00;  Stop 3/18/20 at 12:33;  Status DC


Info (Tpn Per Pharmacy) 1 each PRN DAILY  PRN MC SEE COMMENTS;  Start 3/18/20 at

12:30;  Status UNV


Sodium Chloride 90 meq/Calcium Gluconate 10 meq/ Multivitamins 10 ml/Chromium/ 

Copper/Manganese/ Seleni/Zn 0.5 ml/ Total Parenteral Nutrition/Amino 

Acids/Dextrose/ Fat Emulsion Intravenous 1,512 ml @  63 mls/hr TPN  CONT IV  

Last administered on 3/18/20at 22:06;  Start 3/18/20 at 22:00;  Stop 3/19/20 at 

21:59;  Status DC


Calcium Carbonate/ Glycine (Tums) 500 mg PRN AFTMEALHC  PRN PO INDIGESTION;  

Start 3/18/20 at 17:45;  Stop 5/13/20 at 10:25;  Status DC


Calcium Gluconate (Calcium Gluconate) 2,000 mg 1X  ONCE IVP  Last administered 

on 3/19/20at 02:19;  Start 3/19/20 at 02:15;  Stop 3/19/20 at 02:16;  Status DC


Calcium Chloride 3000 mg/Sodium Chloride 1,030 ml @  50 mls/hr [id number] IV  Last 

administered on 3/21/20at 02:17;  Start 3/19/20 at 08:00;  Stop 3/21/20 at 

15:23;  Status DC


Lorazepam (Ativan Inj) 1 mg PRN Q4HRS  PRN IVP ANXIETY / AGITATION, 2nd choic 

Last administered on 4/17/20at 03:51;  Start 3/19/20 at 09:00;  Stop 4/17/20 at 

09:19;  Status DC


Sodium Chloride 1,000 ml @  1,000 mls/hr Q1H PRN IV hypotension;  Start 3/19/20 

at 08:56;  Stop 3/19/20 at 14:55;  Status DC


Albumin Human 200 ml @  200 mls/hr 1X PRN  PRN IV Hypotension;  Start 3/19/20 at

09:00;  Stop 3/19/20 at 14:59;  Status DC


Diphenhydramine HCl (Benadryl) 25 mg 1X PRN  PRN IV ITCHING;  Start 3/19/20 at 

09:00;  Stop 3/20/20 at 08:59;  Status DC


Diphenhydramine HCl (Benadryl) 25 mg 1X PRN  PRN IV ITCHING;  Start 3/19/20 at 

09:00;  Stop 3/20/20 at 08:59;  Status DC


Sodium Chloride 1,000 ml @  400 mls/hr Q2H30M PRN IV PATENCY;  Start 3/19/20 at 

08:56;  Stop 3/19/20 at 20:55;  Status DC


Info (PHARMACY MONITORING -- do not chart) 1 each PRN DAILY  PRN MC SEE 

COMMENTS;  Start 3/19/20 at 09:00;  Status UNV


Info (PHARMACY MONITORING -- do not chart) 1 each PRN DAILY  PRN MC SEE 

COMMENTS;  Start 3/19/20 at 09:00;  Stop 3/20/20 at 08:13;  Status DC


Digoxin (Lanoxin) 500 mcg 1X  ONCE IV  Last administered on 3/19/20at 10:04;  

Start 3/19/20 at 10:00;  Stop 3/19/20 at 10:01;  Status DC


Digoxin (Lanoxin) 125 mcg 1X  ONCE IV  Last administered on 3/19/20at 17:10;  

Start 3/19/20 at 18:00;  Stop 3/19/20 at 18:01;  Status DC


Magnesium Sulfate 100 ml @  25 mls/hr 1X  ONCE IV  Last administered on 

3/19/20at 12:48;  Start 3/19/20 at 13:00;  Stop 3/19/20 at 16:59;  Status DC


Sodium Chloride 90 meq/Magnesium Sulfate 10 meq/ Calcium Gluconate 20 meq/ 

Multivitamins 10 ml/Chromium/ Copper/Manganese/ Seleni/Zn 0.5 ml/ Total 

Parenteral Nutrition/Amino Acids/Dextrose/ Fat Emulsion Intravenous 1,512 ml @  

63 mls/hr TPN  CONT IV  Last administered on 3/19/20at 22:25;  Start 3/19/20 at 

22:00;  Stop 3/20/20 at 21:59;  Status DC


Sodium Chloride 1,000 ml @  1,000 mls/hr Q1H PRN IV hypotension;  Start 3/20/20 

at 08:05;  Stop 3/20/20 at 14:04;  Status DC


Albumin Human 200 ml @  200 mls/hr 1X  ONCE IV  Last administered on 3/20/20at 

08:57;  Start 3/20/20 at 08:15;  Stop 3/20/20 at 09:14;  Status DC


Diphenhydramine HCl (Benadryl) 25 mg 1X PRN  PRN IV ITCHING;  Start 3/20/20 at 

08:15;  Stop 3/21/20 at 08:14;  Status DC


Diphenhydramine HCl (Benadryl) 25 mg 1X PRN  PRN IV ITCHING;  Start 3/20/20 at 

08:15;  Stop 3/21/20 at 08:14;  Status DC


Sodium Chloride 1,000 ml @  400 mls/hr Q2H30M PRN IV PATENCY;  Start 3/20/20 at 

08:05;  Stop 3/20/20 at 20:04;  Status DC


Info (PHARMACY MONITORING -- do not chart) 1 each PRN DAILY  PRN MC SEE 

COMMENTS;  Start 3/20/20 at 08:15;  Stop 3/24/20 at 07:57;  Status DC


Sodium Chloride 90 meq/Potassium Chloride 15 meq/ Potassium Phosphate 10 mmol/ 

Magnesium Sulfate 10 meq/Calcium Gluconate 20 meq/ Multivitamins 10 ml/Chromium/

Copper/Manganese/ Seleni/Zn 0.5 ml/ Total Parenteral Nutrition/Amino 

Acids/Dextrose/ Fat Emulsion Intravenous 1,512 ml @  63 mls/hr TPN  CONT IV  

Last administered on 3/20/20at 21:01;  Start 3/20/20 at 22:00;  Stop 3/21/20 at 

21:59;  Status DC


Potassium Chloride/Water 100 ml @  100 mls/hr 1X  ONCE IV  Last administered on 

3/20/20at 14:09;  Start 3/20/20 at 14:00;  Stop 3/20/20 at 14:59;  Status DC


Benzocaine (Hurricaine One) 1 spray 1X  ONCE MM  Last administered on 3/20/20at 

16:38;  Start 3/20/20 at 14:30;  Stop 3/20/20 at 14:31;  Status DC


Lidocaine HCl (Glydo (Lidocaine) Jelly) 1 thomas 1X  ONCE MM  Last administered on 

3/20/20at 16:38;  Start 3/20/20 at 14:30;  Stop 3/20/20 at 14:31;  Status DC


Linezolid/Dextrose 300 ml @  300 mls/hr Q12HR IV  Last administered on 3/26/20at

21:04;  Start 3/20/20 at 20:00;  Stop 3/27/20 at 07:50;  Status DC


Acetaminophen (Tylenol) 650 mg PRN Q6HRS  PRN PO MILD PAIN / TEMP;  Start 

3/21/20 at 03:30;  Stop 3/21/20 at 03:36;  Status DC


Acetaminophen (Tylenol) 650 mg PRN Q6HRS  PRN PEG MILD PAIN / TEMP Last 

administered on 4/16/20at 19:56;  Start 3/21/20 at 03:36;  Stop 5/13/20 at 

10:25;  Status DC


Sodium Chloride 1,000 ml @  1,000 mls/hr Q1H PRN IV hypotension;  Start 3/21/20 

at 07:50;  Stop 3/21/20 at 13:49;  Status DC


Albumin Human 200 ml @  200 mls/hr 1X PRN  PRN IV Hypotension;  Start 3/21/20 at

08:00;  Stop 3/21/20 at 13:59;  Status DC


Sodium Chloride (Normal Saline Flush) 10 ml 1X PRN  PRN IV AP catheter pack;  

Start 3/21/20 at 08:00;  Stop 3/22/20 at 07:59;  Status DC


Sodium Chloride (Normal Saline Flush) 10 ml 1X PRN  PRN IV  catheter pack;  

Start 3/21/20 at 08:00;  Stop 3/22/20 at 07:59;  Status DC


Sodium Chloride 1,000 ml @  400 mls/hr Q2H30M PRN IV PATENCY;  Start 3/21/20 at 

07:50;  Stop 3/21/20 at 19:49;  Status DC


Info (PHARMACY MONITORING -- do not chart) 1 each PRN DAILY  PRN MC SEE 

COMMENTS;  Start 3/21/20 at 08:00;  Status UNV


Info (PHARMACY MONITORING -- do not chart) 1 each PRN DAILY  PRN MC SEE 

COMMENTS;  Start 3/21/20 at 08:00;  Stop 3/23/20 at 08:25;  Status DC


Sodium Chloride 90 meq/Potassium Chloride 15 meq/ Potassium Phosphate 10 mmol/ 

Magnesium Sulfate 10 meq/Calcium Gluconate 20 meq/ Multivitamins 10 ml/Chromium/

Copper/Manganese/ Seleni/Zn 0.5 ml/ Total Parenteral Nutrition/Amino 

Acids/Dextrose/ Fat Emulsion Intravenous 1,512 ml @  63 mls/hr TPN  CONT IV  

Last administered on 3/21/20at 20:57;  Start 3/21/20 at 22:00;  Stop 3/22/20 at 

21:59;  Status DC


Sodium Chloride 90 meq/Potassium Chloride 15 meq/ Potassium Phosphate 15 mmol/ 

Magnesium Sulfate 10 meq/Calcium Gluconate 20 meq/ Multivitamins 10 ml/Chromium/

Copper/Manganese/ Seleni/Zn 0.5 ml/ Total Parenteral Nutrition/Amino Ac

ids/Dextrose/ Fat Emulsion Intravenous 1,512 ml @  63 mls/hr TPN  CONT IV ;  

Start 3/22/20 at 22:00;  Stop 3/22/20 at 14:16;  Status DC


Sodium Chloride 90 meq/Potassium Chloride 15 meq/ Potassium Phosphate 15 mmol/ 

Magnesium Sulfate 10 meq/Calcium Gluconate 20 meq/ Multivitamins 10 ml/Chromium/

Copper/Manganese/ Seleni/Zn 0.5 ml/ Total Parenteral Nutrition/Amino 

Acids/Dextrose/ Fat Emulsion Intravenous 1,200 ml @  50 mls/hr TPN  CONT IV ;  

Start 3/22/20 at 22:00;  Stop 3/22/20 at 14:17;  Status DC


Sodium Chloride 90 meq/Potassium Chloride 15 meq/ Potassium Phosphate 10 mmol/ 

Magnesium Sulfate 10 meq/Calcium Gluconate 20 meq/ Multivitamins 10 ml/Chromium/

Copper/Manganese/ Seleni/Zn 0.5 ml/ Total Parenteral Nutrition/Amino 

Acids/Dextrose/ Fat Emulsion Intravenous 1,200 ml @  50 mls/hr TPN  CONT IV  

Last administered on 3/22/20at 23:29;  Start 3/22/20 at 22:00;  Stop 3/23/20 at 

21:59;  Status DC


Sodium Chloride 1,000 ml @  1,000 mls/hr Q1H PRN IV hypotension;  Start 3/23/20 

at 07:28;  Stop 3/23/20 at 13:27;  Status DC


Albumin Human 200 ml @  200 mls/hr 1X  ONCE IV  Last administered on 3/23/20at 

08:51;  Start 3/23/20 at 07:30;  Stop 3/23/20 at 08:29;  Status DC


Diphenhydramine HCl (Benadryl) 25 mg 1X PRN  PRN IV ITCHING;  Start 3/23/20 at 

07:30;  Stop 3/24/20 at 07:29;  Status DC


Diphenhydramine HCl (Benadryl) 25 mg 1X PRN  PRN IV ITCHING;  Start 3/23/20 at 

07:30;  Stop 3/24/20 at 07:29;  Status DC


Sodium Chloride 1,000 ml @  400 mls/hr Q2H30M PRN IV PATENCY;  Start 3/23/20 at 

07:28;  Stop 3/23/20 at 19:27;  Status DC


Info (PHARMACY MONITORING -- do not chart) 1 each PRN DAILY  PRN MC SEE 

COMMENTS;  Start 3/23/20 at 07:30;  Stop 4/3/20 at 13:01;  Status DC


Metronidazole 100 ml @  100 mls/hr Q6HRS IV  Last administered on 4/8/20at 

06:26;  Start 3/23/20 at 08:30;  Stop 4/8/20 at 09:58;  Status DC


Micafungin Sodium 100 mg/Dextrose 100 ml @  100 mls/hr Q24H IV  Last 

administered on 4/30/20at 08:18;  Start 3/23/20 at 09:00;  Stop 4/30/20 at 

20:58;  Status DC


Propofol 0 ml @ As Directed STK-MED ONCE IV ;  Start 3/23/20 at 07:53;  Stop 

3/23/20 at 07:53;  Status DC


Etomidate (Amidate) 20 mg STK-MED ONCE IV ;  Start 3/23/20 at 07:53;  Stop 

3/23/20 at 07:54;  Status DC


Midazolam HCl (Versed) 5 mg STK-MED ONCE .ROUTE ;  Start 3/23/20 at 07:57;  Stop

3/23/20 at 07:57;  Status DC


Fentanyl Citrate 30 ml @ 0 mls/hr CONT  PRN IV SEE PROTOCOL Last administered on

4/17/20at 06:12;  Start 3/23/20 at 08:15;  Stop 4/17/20 at 09:19;  Status DC


Artificial Tears (Artificial Tears) 1 drop PRN Q1HR  PRN OU DRY EYE, 1st choice;

 Start 3/23/20 at 08:15;  Stop 4/29/20 at 05:31;  Status DC


Midazolam HCl 50 mg/Sodium Chloride 50 ml @ 0 mls/hr CONT  PRN IV SEE PROTOCOL 

Last administered on 3/26/20at 22:39;  Start 3/23/20 at 08:15;  Stop 3/28/20 at 

15:59;  Status DC


Etomidate (Amidate) 8 mg 1X  ONCE IV  Last administered on 3/23/20at 08:33;  

Start 3/23/20 at 08:30;  Stop 3/23/20 at 08:31;  Status DC


Succinylcholine Chloride (Anectine) 120 mg 1X  ONCE IV  Last administered on 

3/23/20at 08:34;  Start 3/23/20 at 08:30;  Stop 3/23/20 at 08:31;  Status DC


Midazolam HCl (Versed) 5 mg 1X  ONCE IV ;  Start 3/23/20 at 08:30;  Stop 3/23/20

at 08:31;  Status DC


Potassium Chloride 15 meq/ Bicarbonate Dialysis Soln w/ out KCl 5,007.5 ml  @ 

1,000 mls/ hr Q5H1M IV  Last administered on 3/24/20at 11:11;  Start 3/23/20 at 

12:00;  Stop 3/24/20 at 11:15;  Status DC


Potassium Chloride 15 meq/ Bicarbonate Dialysis Soln w/ out KCl 5,007.5 ml  @ 

1,000 mls/ hr Q5H1M IV  Last administered on 3/24/20at 11:12;  Start 3/23/20 at 

12:00;  Stop 3/24/20 at 11:17;  Status DC


Potassium Chloride 15 meq/ Bicarbonate Dialysis Soln w/ out KCl 5,007.5 ml  @ 

1,000 mls/ hr Q5H1M IV  Last administered on 3/24/20at 11:11;  Start 3/23/20 at 

12:00;  Stop 3/24/20 at 11:19;  Status DC


Sodium Chloride 90 meq/Potassium Chloride 15 meq/ Potassium Phosphate 10 mmol/ 

Magnesium Sulfate 10 meq/Calcium Gluconate 20 meq/ Multivitamins 10 ml/Chromium/

Copper/Manganese/ Seleni/Zn 0.5 ml/ Total Parenteral Nutrition/Amino 

Acids/Dextrose/ Fat Emulsion Intravenous 1,400 ml @  58.333 mls/ hr TPN  CONT IV

 Last administered on 3/23/20at 21:42;  Start 3/23/20 at 22:00;  Stop 3/24/20 at

21:59;  Status DC


Heparin Sodium (Porcine) (Heparin Sodium) 5,000 unit Q8HRS SQ  Last administered

on 3/28/20at 05:55;  Start 3/23/20 at 15:00;  Stop 3/28/20 at 13:28;  Status DC


Meropenem 500 mg/ Sodium Chloride 50 ml @  100 mls/hr Q6HRS IV  Last 

administered on 3/25/20at 06:00;  Start 3/24/20 at 09:00;  Stop 3/25/20 at 

07:29;  Status DC


Potassium Phosphate 20 mmol/ Sodium Chloride 106.6667 ml @  51.667 m... 1X  ONCE

IV  Last administered on 3/24/20at 11:22;  Start 3/24/20 at 10:15;  Stop 3/24/20

at 12:18;  Status DC


Acetaminophen (Tylenol Supp) 650 mg PRN Q6HRS  PRN MS MILD PAIN / TEMP > 100.3'F

Last administered on 8/3/20at 15:24;  Start 3/24/20 at 10:30


Potassium Chloride/Water 100 ml @  100 mls/hr Q1H IV  Last administered on 

3/24/20at 12:12;  Start 3/24/20 at 11:00;  Stop 3/24/20 at 12:59;  Status DC


Potassium Chloride 20 meq/ Bicarbonate Dialysis Soln w/ out KCl 5,010 ml @  

1,000 mls/hr Q5H1M IV  Last administered on 3/25/20at 08:48;  Start 3/24/20 at 

12:00;  Stop 3/25/20 at 13:03;  Status DC


Potassium Chloride 20 meq/ Bicarbonate Dialysis Soln w/ out KCl 5,010 ml @  

1,000 mls/hr Q5H1M IV  Last administered on 3/29/20at 14:52;  Start 3/24/20 at 

11:30;  Stop 3/29/20 at 19:59;  Status DC


Potassium Chloride 20 meq/ Bicarbonate Dialysis Soln w/ out KCl 5,010 ml @  

1,000 mls/hr Q5H1M IV  Last administered on 3/29/20at 14:53;  Start 3/24/20 at 

11:30;  Stop 3/29/20 at 19:59;  Status DC


Sodium Chloride 90 meq/Potassium Chloride 15 meq/ Potassium Phosphate 15 mmol/ 

Magnesium Sulfate 10 meq/Calcium Gluconate 15 meq/ Multivitamins 10 ml/Chromium/

Copper/Manganese/ Seleni/Zn 0.5 ml/ Total Parenteral Nutrition/Amino 

Acids/Dextrose/ Fat Emulsion Intravenous 1,400 ml @  58.333 mls/ hr TPN  CONT IV

 Last administered on 3/24/20at 22:17;  Start 3/24/20 at 22:00;  Stop 3/25/20 at

21:59;  Status DC


Cefepime HCl (Maxipime) 2 gm Q12HR IVP  Last administered on 4/7/20at 20:56;  

Start 3/25/20 at 09:00;  Stop 4/8/20 at 09:58;  Status DC


Daptomycin 500 mg/ Sodium Chloride 50 ml @  100 mls/hr Q48H IV  Last 

administered on 4/10/20at 09:57;  Start 3/25/20 at 08:30;  Stop 4/10/20 at 

10:07;  Status DC


Lidocaine HCl (Buffered Lidocaine 1%) 3 ml 1X  ONCE INJ  Last administered on 

3/25/20at 10:27;  Start 3/25/20 at 10:30;  Stop 3/25/20 at 10:31;  Status DC


Potassium Phosphate 20 mmol/ Sodium Chloride 106.6667 ml @  51.667 m... 1X  ONCE

IV  Last administered on 3/25/20at 12:51;  Start 3/25/20 at 13:00;  Stop 3/25/20

at 15:03;  Status DC


Sodium Chloride 90 meq/Potassium Chloride 15 meq/ Potassium Phosphate 18 mmol/ 

Magnesium Sulfate 8 meq/Calcium Gluconate 15 meq/ Multivitamins 10 ml/Chromium/ 

Copper/Manganese/ Seleni/Zn 0.5 ml/ Total Parenteral Nutrition/Amino 

Acids/Dextrose/ Fat Emulsion Intravenous 1,400 ml @  58.333 mls/ hr TPN  CONT IV

 Last administered on 3/25/20at 22:16;  Start 3/25/20 at 22:00;  Stop 3/26/20 at

21:59;  Status DC


Potassium Chloride 20 meq/ Bicarbonate Dialysis Soln w/ out KCl 5,010 ml @  

1,000 mls/hr Q5H1M IV  Last administered on 3/29/20at 14:54;  Start 3/25/20 at 

16:00;  Stop 3/29/20 at 19:59;  Status DC


Multi-Ingred Cream/Lotion/Oil/ Oint (Artificial Tears Eye Ointment) 1 thomas PRN 

Q1HR  PRN OU DRY EYE, 2nd choice Last administered on 4/13/20at 08:19;  Start 

3/25/20 at 17:30;  Stop 6/3/20 at 14:39;  Status DC


Sodium Chloride 90 meq/Potassium Chloride 15 meq/ Potassium Phosphate 18 mmol/ 

Magnesium Sulfate 8 meq/Calcium Gluconate 15 meq/ Multivitamins 10 ml/Chromium/ 

Copper/Manganese/ Seleni/Zn 0.5 ml/ Total Parenteral Nutrition/Amino 

Acids/Dextrose/ Fat Emulsion Intravenous 1,400 ml @  58.333 mls/ hr TPN  CONT IV

 Last administered on 3/26/20at 22:00;  Start 3/26/20 at 22:00;  Stop 3/27/20 at

21:59;  Status DC


Albumin Human 500 ml @  125 mls/hr 1X  ONCE IV ;  Start 3/26/20 at 14:15;  Stop 

3/26/20 at 18:14;  Status DC


Sodium Chloride 90 meq/Potassium Chloride 15 meq/ Potassium Phosphate 18 mmol/ 

Magnesium Sulfate 8 meq/Calcium Gluconate 15 meq/ Multivitamins 10 ml/Chromium/ 

Copper/Manganese/ Seleni/Zn 0.5 ml/ Insulin Human Regular 10 unit/ Total 

Parenteral Nutrition/Amino Acids/Dextrose/ Fat Emulsion Intravenous 1,400 ml @  

58.333 mls/ hr TPN  CONT IV  Last administered on 3/27/20at 21:43;  Start 

3/27/20 at 22:00;  Stop 3/28/20 at 21:59;  Status DC


Lidocaine HCl (Buffered Lidocaine 1%) 3 ml STK-MED ONCE .ROUTE ;  Start 3/25/20 

at 10:00;  Stop 3/27/20 at 13:57;  Status DC


Midazolam HCl 100 mg/Sodium Chloride 100 ml @ 7 mls/hr CONT  PRN IV SEE PROTOCOL

Last administered on 4/8/20at 15:35;  Start 3/28/20 at 16:00;  Stop 6/3/20 at 

14:38;  Status DC


Sodium Chloride 90 meq/Potassium Chloride 15 meq/ Potassium Phosphate 18 mmol/ 

Magnesium Sulfate 8 meq/Calcium Gluconate 15 meq/ Multivitamins 10 ml/Chromium/ 

Copper/Manganese/ Seleni/Zn 0.5 ml/ Insulin Human Regular 15 unit/ Total 

Parenteral Nutrition/Amino Acids/Dextrose/ Fat Emulsion Intravenous 1,400 ml @  

58.333 mls/ hr TPN  CONT IV  Last administered on 3/28/20at 20:34;  Start 

3/28/20 at 22:00;  Stop 3/29/20 at 21:59;  Status DC


Info ([facility] Electrolyte Protocol) 1 ea CONT PRN  PRN MC PER PROTOCOL;  Start 

3/29/20 at 13:15


Sodium Chloride 90 meq/Potassium Chloride 15 meq/ Potassium Phosphate 18 mmol/ 

Magnesium Sulfate 8 meq/Calcium Gluconate 15 meq/ Multivitamins 10 ml/Chromium/ 

Copper/Manganese/ Seleni/Zn 0.5 ml/ Insulin Human Regular 15 unit/ Total 

Parenteral Nutrition/Amino Acids/Dextrose/ Fat Emulsion Intravenous 1,400 ml @  

58.333 mls/ hr TPN  CONT IV  Last administered on 3/29/20at 22:05;  Start 

3/29/20 at 22:00;  Stop 3/30/20 at 21:59;  Status DC


Potassium Chloride 15 meq/ Bicarbonate Dialysis Soln w/ out KCl 5,007.5 ml  @ 

1,000 mls/ hr Q5H1M IV  Last administered on 4/1/20at 18:14;  Start 3/29/20 at 

20:00;  Stop 4/2/20 at 13:08;  Status DC


Potassium Chloride 15 meq/ Bicarbonate Dialysis Soln w/ out KCl 5,007.5 ml  @ 

1,000 mls/ hr Q5H1M IV  Last administered on 4/1/20at 18:14;  Start 3/29/20 at 

20:00;  Stop 4/2/20 at 13:08;  Status DC


Potassium Chloride 15 meq/ Bicarbonate Dialysis Soln w/ out KCl 5,007.5 ml  @ 

1,000 mls/ hr Q5H1M IV  Last administered on 4/1/20at 18:14;  Start 3/29/20 at 

20:00;  Stop 4/2/20 at 13:08;  Status DC


Iohexol (Omnipaque 240 Mg/ml) 30 ml 1X  ONCE PO  Last administered on 3/30/20at 

11:30;  Start 3/30/20 at 11:30;  Stop 3/30/20 at 11:33;  Status DC


Info (CONTRAST GIVEN -- Rx MONITORING) 1 each PRN DAILY  PRN MC SEE COMMENTS;  

Start 3/30/20 at 11:45;  Stop 4/1/20 at 11:44;  Status DC


Sodium Chloride 90 meq/Potassium Chloride 15 meq/ Potassium Phosphate 18 mmol/ 

Magnesium Sulfate 8 meq/Calcium Gluconate 15 meq/ Multivitamins 10 ml/Chromium/ 

Copper/Manganese/ Seleni/Zn 0.5 ml/ Insulin Human Regular 15 unit/ Total 

Parenteral Nutrition/Amino Acids/Dextrose/ Fat Emulsion Intravenous 1,400 ml @  

58.333 mls/ hr TPN  CONT IV  Last administered on 3/30/20at 21:47;  Start 

3/30/20 at 22:00;  Stop 3/31/20 at 21:59;  Status DC


Sodium Chloride 90 meq/Potassium Chloride 15 meq/ Potassium Phosphate 18 mmol/ 

Magnesium Sulfate 8 meq/Calcium Gluconate 15 meq/ Multivitamins 10 ml/Chromium/ 

Copper/Manganese/ Seleni/Zn 0.5 ml/ Insulin Human Regular 20 unit/ Total 

Parenteral Nutrition/Amino Acids/Dextrose/ Fat Emulsion Intravenous 1,400 ml @  

58.333 mls/ hr TPN  CONT IV  Last administered on 3/31/20at 21:36;  Start 

3/31/20 at 22:00;  Stop 4/1/20 at 21:59;  Status DC


Alteplase, Recombinant (Cathflo For Central Catheter Clearance) 1 mg 1X  ONCE 

INT CAT  Last administered on 3/31/20at 20:03;  Start 3/31/20 at 19:30;  Stop 

3/31/20 at 19:46;  Status DC


Alteplase, Recombinant (Cathflo For Central Catheter Clearance) 1 mg 1X  ONCE 

INT CAT  Last administered on 3/31/20at 22:05;  Start 3/31/20 at 22:00;  Stop 

3/31/20 at 22:01;  Status DC


Sodium Chloride 90 meq/Potassium Chloride 15 meq/ Potassium Phosphate 18 mmol/ 

Magnesium Sulfate 8 meq/Calcium Gluconate 15 meq/ Multivitamins 10 ml/Chromium/ 

Copper/Manganese/ Seleni/Zn 0.5 ml/ Insulin Human Regular 20 unit/ Total 

Parenteral Nutrition/Amino Acids/Dextrose/ Fat Emulsion Intravenous 1,400 ml @  

58.333 mls/ hr TPN  CONT IV  Last administered on 4/1/20at 21:30;  Start 4/1/20 

at 22:00;  Stop 4/2/20 at 21:59;  Status DC


Dexmedetomidine HCl 400 mcg/ Sodium Chloride 100 ml @ 0 mls/hr CONT  PRN IV 

ANXIETY / AGITATION Last administered on 5/30/20at 12:57;  Start 4/2/20 at 

08:15;  Stop 5/30/20 at 18:31;  Status DC


Sodium Chloride 500 ml @  500 mls/hr 1X PRN  PRN IV ELEVATED BP, SEE COMMENTS;  

Start 4/2/20 at 08:15;  Stop 8/5/20 at 09:08;  Status DC


Atropine Sulfate (ATROPINE 0.5mg SYRINGE) 0.5 mg PRN Q5MIN  PRN IV SEE COMMENTS;

 Start 4/2/20 at 08:15;  Stop 8/3/20 at 09:45;  Status DC


Furosemide (Lasix) 20 mg 1X  ONCE IVP  Last administered on 4/2/20at 08:19;  

Start 4/2/20 at 08:15;  Stop 4/2/20 at 08:16;  Status DC


Lidocaine HCl (Buffered Lidocaine 1%) 3 ml STK-MED ONCE .ROUTE ;  Start 4/2/20 

at 08:39;  Stop 4/2/20 at 08:39;  Status DC


Lidocaine HCl (Buffered Lidocaine 1%) 6 ml 1X  ONCE INJ  Last administered on 

4/2/20at 09:05;  Start 4/2/20 at 09:00;  Stop 4/2/20 at 09:06;  Status DC


Sodium Chloride 90 meq/Potassium Chloride 15 meq/ Potassium Phosphate 18 mmol/ 

Magnesium Sulfate 8 meq/Calcium Gluconate 15 meq/ Multivitamins 10 ml/Chromium/ 

Copper/Manganese/ Seleni/Zn 0.5 ml/ Insulin Human Regular 20 unit/ Total 

Parenteral Nutrition/Amino Acids/Dextrose/ Fat Emulsion Intravenous 1,400 ml @  

58.333 mls/ hr TPN  CONT IV  Last administered on 4/2/20at 22:45;  Start 4/2/20 

at 22:00;  Stop 4/3/20 at 21:59;  Status DC


Sodium Chloride 1,000 ml @  1,000 mls/hr Q1H PRN IV hypotension;  Start 4/3/20 

at 07:30;  Stop 4/3/20 at 13:29;  Status DC


Albumin Human 200 ml @  200 mls/hr 1X PRN  PRN IV Hypotension Last administered 

on 4/3/20at 09:36;  Start 4/3/20 at 07:30;  Stop 4/3/20 at 13:29;  Status DC


Sodium Chloride (Normal Saline Flush) 10 ml 1X PRN  PRN IV AP catheter pack;  

Start 4/3/20 at 07:30;  Stop 4/3/20 at 21:29;  Status DC


Sodium Chloride (Normal Saline Flush) 10 ml 1X PRN  PRN IV  catheter pack;  

Start 4/3/20 at 07:30;  Stop 4/4/20 at 07:29;  Status DC


Sodium Chloride 1,000 ml @  400 mls/hr Q2H30M PRN IV PATENCY;  Start 4/3/20 at 

07:30;  Stop 4/3/20 at 19:29;  Status DC


Info (PHARMACY MONITORING -- do not chart) 1 each PRN DAILY  PRN MC SEE 

COMMENTS;  Start 4/3/20 at 07:30;  Stop 4/3/20 at 13:02;  Status DC


Info (PHARMACY MONITORING -- do not chart) 1 each PRN DAILY  PRN MC SEE 

COMMENTS;  Start 4/3/20 at 07:30;  Stop 4/5/20 at 12:45;  Status DC


Sodium Chloride 90 meq/Potassium Chloride 15 meq/ Potassium Phosphate 10 mmol/ 

Magnesium Sulfate 8 meq/Calcium Gluconate 15 meq/ Multivitamins 10 ml/Chromium/ 

Copper/Manganese/ Seleni/Zn 0.5 ml/ Insulin Human Regular 25 unit/ Total Par

enteral Nutrition/Amino Acids/Dextrose/ Fat Emulsion Intravenous 1,400 ml @  

58.333 mls/ hr TPN  CONT IV  Last administered on 4/3/20at 22:19;  Start 4/3/20 

at 22:00;  Stop 4/4/20 at 21:59;  Status DC


Heparin Sodium (Porcine) (Heparin Sodium) 5,000 unit Q12HR SQ  Last administered

on 4/26/20at 08:59;  Start 4/3/20 at 21:00;  Stop 4/26/20 at 10:05;  Status DC


Ondansetron HCl (Zofran) 4 mg PRN Q6HRS  PRN IV NAUSEA/VOMITING;  Start 4/6/20 

at 07:00;  Stop 4/7/20 at 06:59;  Status DC


Fentanyl Citrate (Fentanyl 2ml Vial) 25 mcg PRN Q5MIN  PRN IV MILD PAIN 1-3;  

Start 4/6/20 at 07:00;  Stop 4/7/20 at 06:59;  Status DC


Fentanyl Citrate (Fentanyl 2ml Vial) 50 mcg PRN Q5MIN  PRN IV MODERATE TO SEVERE

PAIN;  Start 4/6/20 at 07:00;  Stop 4/7/20 at 06:59;  Status DC


Ringer's Solution 1,000 ml @  30 mls/hr Q24H IV ;  Start 4/6/20 at 07:00;  Stop 

4/6/20 at 18:59;  Status DC


Lidocaine HCl (Xylocaine-Mpf 1% 2ml Vial) 2 ml PRN 1X  PRN ID PRIOR TO IV START;

 Start 4/6/20 at 07:00;  Stop 4/7/20 at 06:59;  Status DC


Prochlorperazine Edisylate (Compazine) 5 mg PACU PRN  PRN IV NAUSEA, MRX1;  

Start 4/6/20 at 07:00;  Stop 4/7/20 at 06:59;  Status DC


Sodium Chloride 1,000 ml @  1,000 mls/hr Q1H PRN IV hypotension;  Start 4/4/20 

at 09:10;  Stop 4/4/20 at 15:09;  Status DC


Albumin Human 200 ml @  200 mls/hr 1X PRN  PRN IV Hypotension Last administered 

on 4/4/20at 10:10;  Start 4/4/20 at 09:15;  Stop 4/4/20 at 15:14;  Status DC


Sodium Chloride 1,000 ml @  400 mls/hr Q2H30M PRN IV PATENCY;  Start 4/4/20 at 

09:10;  Stop 4/4/20 at 21:09;  Status DC


Info (PHARMACY MONITORING -- do not chart) 1 each PRN DAILY  PRN MC SEE 

COMMENTS;  Start 4/4/20 at 09:15;  Stop 4/5/20 at 12:45;  Status DC


Info (PHARMACY MONITORING -- do not chart) 1 each PRN DAILY  PRN MC SEE 

COMMENTS;  Start 4/4/20 at 09:15;  Stop 4/5/20 at 12:45;  Status DC


Sodium Chloride 90 meq/Potassium Chloride 15 meq/ Potassium Phosphate 10 mmol/ 

Magnesium Sulfate 8 meq/Calcium Gluconate 15 meq/ Multivitamins 10 ml/Chromium/ 

Copper/Manganese/ Seleni/Zn 0.5 ml/ Insulin Human Regular 25 unit/ Total 

Parenteral Nutrition/Amino Acids/Dextrose/ Fat Emulsion Intravenous 1,400 ml @  

58.333 mls/ hr TPN  CONT IV  Last administered on 4/4/20at 22:10;  Start 4/4/20 

at 22:00;  Stop 4/5/20 at 21:59;  Status DC


Magnesium Sulfate 50 ml @ 25 mls/hr PRN DAILY  PRN IV for Mag < 1.7 on am labs 

Last administered on 6/18/20at 10:57;  Start 4/5/20 at 09:15


Sodium Chloride 90 meq/Potassium Chloride 15 meq/ Potassium Phosphate 10 mmol/ 

Magnesium Sulfate 8 meq/Calcium Gluconate 15 meq/ Multivitamins 10 ml/Chromium/ 

Copper/Manganese/ Seleni/Zn 0.5 ml/ Insulin Human Regular 25 unit/ Total 

Parenteral Nutrition/Amino Acids/Dextrose/ Fat Emulsion Intravenous 1,400 ml @  

58.333 mls/ hr TPN  CONT IV  Last administered on 4/5/20at 21:20;  Start 4/5/20 

at 22:00;  Stop 4/6/20 at 21:59;  Status DC


Sodium Chloride 1,000 ml @  1,000 mls/hr Q1H PRN IV hypotension;  Start 4/5/20 

at 12:23;  Stop 4/5/20 at 18:22;  Status DC


Albumin Human 200 ml @  200 mls/hr 1X  ONCE IV  Last administered on 4/5/20at 

13:34;  Start 4/5/20 at 12:30;  Stop 4/5/20 at 13:29;  Status DC


Diphenhydramine HCl (Benadryl) 25 mg 1X PRN  PRN IV ITCHING;  Start 4/5/20 at 

12:30;  Stop 4/6/20 at 12:29;  Status DC


Diphenhydramine HCl (Benadryl) 25 mg 1X PRN  PRN IV ITCHING;  Start 4/5/20 at 

12:30;  Stop 4/6/20 at 12:29;  Status DC


Info (PHARMACY MONITORING -- do not chart) 1 each PRN DAILY  PRN MC SEE 

COMMENTS;  Start 4/5/20 at 12:30;  Status Cancel


Bupivacaine HCl/ Epinephrine Bitart (Sensorcain-Epi 0.5%-1:[id number] Mpf) 30 ml 

STK-MED ONCE .ROUTE  Last administered on 4/6/20at 11:44;  Start 4/6/20 at 

11:00;  Stop 4/6/20 at 11:01;  Status DC


Cellulose (Surgicel Fibrillar 1x2) 1 each STK-MED ONCE .ROUTE ;  Start 4/6/20 at

11:00;  Stop 4/6/20 at 11:01;  Status DC


Sodium Chloride 90 meq/Potassium Chloride 15 meq/ Potassium Phosphate 10 mmol/ 

Magnesium Sulfate 12 meq/Calcium Gluconate 15 meq/ Multivitamins 10 ml/Chromium/

Copper/Manganese/ Seleni/Zn 0.5 ml/ Insulin Human Regular 25 unit/ Total 

Parenteral Nutrition/Amino Acids/Dextrose/ Fat Emulsion Intravenous 1,400 ml @  

58.333 mls/ hr TPN  CONT IV  Last administered on 4/6/20at 22:24;  Start 4/6/20 

at 22:00;  Stop 4/7/20 at 21:59;  Status DC


Propofol 20 ml @ As Directed STK-MED ONCE IV ;  Start 4/6/20 at 11:07;  Stop 

4/6/20 at 11:07;  Status DC


Cellulose (Surgicel Hemostat 4x8) 1 each STK-MED ONCE .ROUTE  Last administered 

on 4/6/20at 11:44;  Start 4/6/20 at 11:55;  Stop 4/6/20 at 11:56;  Status DC


Sevoflurane (Ultane) 60 ml STK-MED ONCE IH ;  Start 4/6/20 at 12:46;  Stop 

4/6/20 at 12:46;  Status DC


Sodium Chloride 1,000 ml @  1,000 mls/hr Q1H PRN IV hypotension;  Start 4/6/20 

at 13:51;  Stop 4/6/20 at 19:50;  Status DC


Albumin Human 200 ml @  200 mls/hr 1X PRN  PRN IV Hypotension Last administered 

on 4/6/20at 14:51;  Start 4/6/20 at 14:00;  Stop 4/6/20 at 19:59;  Status DC


Diphenhydramine HCl (Benadryl) 25 mg 1X PRN  PRN IV ITCHING;  Start 4/6/20 at 

14:00;  Stop 4/7/20 at 13:59;  Status DC


Diphenhydramine HCl (Benadryl) 25 mg 1X PRN  PRN IV ITCHING;  Start 4/6/20 at 

14:00;  Stop 4/7/20 at 13:59;  Status DC


Sodium Chloride 1,000 ml @  400 mls/hr Q2H30M PRN IV PATENCY;  Start 4/6/20 at 

13:51;  Stop 4/7/20 at 01:50;  Status DC


Info (PHARMACY MONITORING -- do not chart) 1 each PRN DAILY  PRN MC SEE 

COMMENTS;  Start 4/6/20 at 14:00;  Stop 4/9/20 at 08:16;  Status DC


Heparin Sodium (Porcine) (Hep Lock Adult) 500 unit STK-MED ONCE IVP ;  Start 

4/7/20 at 09:29;  Stop 4/7/20 at 09:30;  Status DC


Sodium Chloride 1,000 ml @  1,000 mls/hr Q1H PRN IV hypotension;  Start 4/7/20 

at 10:43;  Stop 4/7/20 at 16:42;  Status DC


Sodium Chloride 1,000 ml @  400 mls/hr Q2H30M PRN IV PATENCY;  Start 4/7/20 at 

10:43;  Stop 4/7/20 at 22:42;  Status DC


Info (PHARMACY MONITORING -- do not chart) 1 each PRN DAILY  PRN MC SEE 

COMMENTS;  Start 4/7/20 at 10:45;  Status UNV


Info (PHARMACY MONITORING -- do not chart) 1 each PRN DAILY  PRN MC SEE 

COMMENTS;  Start 4/7/20 at 10:45;  Status UNV


Sodium Chloride 90 meq/Potassium Chloride 15 meq/ Magnesium Sulfate 12 

meq/Calcium Gluconate 15 meq/ Multivitamins 10 ml/Chromium/ Copper/Manganese/ 

Seleni/Zn 0.5 ml/ Insulin Human Regular 25 unit/ Total Parenteral 

Nutrition/Amino Acids/Dextrose/ Fat Emulsion Intravenous 1,400 ml @  58.333 mls/

hr TPN  CONT IV  Last administered on 4/7/20at 22:13;  Start 4/7/20 at 22:00;  

Stop 4/8/20 at 21:59;  Status DC


Sodium Chloride 1,000 ml @  1,000 mls/hr Q1H PRN IV hypotension;  Start 4/8/20 

at 07:50;  Stop 4/8/20 at 13:49;  Status DC


Albumin Human 200 ml @  200 mls/hr 1X  ONCE IV ;  Start 4/8/20 at 08:00;  Stop 

4/8/20 at 08:53;  Status DC


Diphenhydramine HCl (Benadryl) 25 mg 1X PRN  PRN IV ITCHING;  Start 4/8/20 at 

08:00;  Stop 4/9/20 at 07:59;  Status DC


Diphenhydramine HCl (Benadryl) 25 mg 1X PRN  PRN IV ITCHING;  Start 4/8/20 at 

08:00;  Stop 4/9/20 at 07:59;  Status DC


Info (PHARMACY MONITORING -- do not chart) 1 each PRN DAILY  PRN MC SEE C

OMMENTS;  Start 4/8/20 at 08:00;  Stop 4/9/20 at 08:16;  Status DC


Albumin Human 50 ml @ 50 mls/hr 1X  ONCE IV ;  Start 4/8/20 at 08:53;  Stop 

4/8/20 at 08:56;  Status DC


Albumin Human 200 ml @  50 mls/hr PRN 1X  PRN IV HYPOTENSION Last administered 

on 4/14/20at 11:54;  Start 4/8/20 at 09:00;  Stop 5/21/20 at 11:14;  Status DC


Meropenem 500 mg/ Sodium Chloride 50 ml @  100 mls/hr Q12H IV  Last administered

on 4/28/20at 10:45;  Start 4/8/20 at 10:00;  Stop 4/28/20 at 12:37;  Status DC


Sodium Chloride 90 meq/Magnesium Sulfate 12 meq/ Calcium Gluconate 15 meq/ 

Multivitamins 10 ml/Chromium/ Copper/Manganese/ Seleni/Zn 0.5 ml/ Insulin Human 

Regular 25 unit/ Total Parenteral Nutrition/Amino Acids/Dextrose/ Fat Emulsion 

Intravenous 1,400 ml @  58.333 mls/ hr TPN  CONT IV  Last administered on 

4/8/20at 21:41;  Start 4/8/20 at 22:00;  Stop 4/9/20 at 21:59;  Status DC


Sodium Chloride 1,000 ml @  1,000 mls/hr Q1H PRN IV hypotension;  Start 4/9/20 

at 07:58;  Stop 4/9/20 at 13:57;  Status DC


Albumin Human 200 ml @  200 mls/hr 1X PRN  PRN IV Hypotension Last administered 

on 4/9/20at 09:30;  Start 4/9/20 at 08:00;  Stop 4/9/20 at 13:59;  Status DC


Sodium Chloride 1,000 ml @  400 mls/hr Q2H30M PRN IV PATENCY;  Start 4/9/20 at 

07:58;  Stop 4/9/20 at 19:57;  Status DC


Info (PHARMACY MONITORING -- do not chart) 1 each PRN DAILY  PRN MC SEE 

COMMENTS;  Start 4/9/20 at 08:00;  Status Cancel


Info (PHARMACY MONITORING -- do not chart) 1 each PRN DAILY  PRN MC SEE 

COMMENTS;  Start 4/9/20 at 08:15;  Status UNV


Sodium Chloride 90 meq/Potassium Phosphate 5 mmol/ Magnesium Sulfate 12 

meq/Calcium Gluconate 15 meq/ Multivitamins 10 ml/Chromium/ Copper/Manganese/ 

Seleni/Zn 0.5 ml/ Insulin Human Regular 30 unit/ Total Parenteral 

Nutrition/Amino Acids/Dextrose/ Fat Emulsion Intravenous 1,400 ml @  58.333 mls/

hr TPN  CONT IV  Last administered on 4/9/20at 22:08;  Start 4/9/20 at 22:00;  

Stop 4/10/20 at 21:59;  Status DC


Linezolid/Dextrose 300 ml @  300 mls/hr Q12HR IV  Last administered on 4/20/20at

20:40;  Start 4/10/20 at 11:00;  Stop 4/21/20 at 08:10;  Status DC


Sodium Chloride 90 meq/Potassium Phosphate 15 mmol/ Magnesium Sulfate 12 

meq/Calcium Gluconate 15 meq/ Multivitamins 10 ml/Chromium/ Copper/Manganese/ 

Seleni/Zn 0.5 ml/ Insulin Human Regular 30 unit/ Total Parenteral 

Nutrition/Amino Acids/Dextrose/ Fat Emulsion Intravenous 1,400 ml @  58.333 mls/

hr TPN  CONT IV  Last administered on 4/10/20at 21:49;  Start 4/10/20 at 22:00; 

Stop 4/11/20 at 21:59;  Status DC


Sodium Chloride 90 meq/Potassium Phosphate 15 mmol/ Magnesium Sulfate 12 meq/Ca

lcium Gluconate 15 meq/ Multivitamins 10 ml/Chromium/ Copper/Manganese/ 

Seleni/Zn 0.5 ml/ Insulin Human Regular 40 unit/ Total Parenteral 

Nutrition/Amino Acids/Dextrose/ Fat Emulsion Intravenous 1,400 ml @  58.333 mls/

hr TPN  CONT IV  Last administered on 4/11/20at 21:21;  Start 4/11/20 at 22:00; 

Stop 4/12/20 at 21:59;  Status DC


Sodium Chloride 1,000 ml @  1,000 mls/hr Q1H PRN IV hypotension;  Start 4/11/20 

at 13:26;  Stop 4/11/20 at 19:25;  Status DC


Albumin Human 200 ml @  200 mls/hr 1X PRN  PRN IV Hypotension Last administered 

on 4/11/20at 15:00;  Start 4/11/20 at 13:30;  Stop 4/11/20 at 19:29;  Status DC


Sodium Chloride (Normal Saline Flush) 10 ml 1X PRN  PRN IV AP catheter pack;  

Start 4/11/20 at 13:30;  Stop 4/12/20 at 13:29;  Status DC


Sodium Chloride (Normal Saline Flush) 10 ml 1X PRN  PRN IV  catheter pack;  

Start 4/11/20 at 13:30;  Stop 4/12/20 at 13:29;  Status DC


Sodium Chloride 1,000 ml @  400 mls/hr Q2H30M PRN IV PATENCY;  Start 4/11/20 at 

13:26;  Stop 4/12/20 at 01:25;  Status DC


Info (PHARMACY MONITORING -- do not chart) 1 each PRN DAILY  PRN MC SEE 

COMMENTS;  Start 4/11/20 at 13:30;  Stop 4/11/20 at 13:33;  Status DC


Info (PHARMACY MONITORING -- do not chart) 1 each PRN DAILY  PRN MC SEE 

COMMENTS;  Start 4/11/20 at 13:30;  Stop 4/11/20 at 13:34;  Status DC


Sodium Chloride 90 meq/Potassium Phosphate 19 mmol/ Magnesium Sulfate 12 

meq/Calcium Gluconate 15 meq/ Multivitamins 10 ml/Chromium/ Copper/Manganese/ 

Seleni/Zn 0.5 ml/ Insulin Human Regular 40 unit/ Total Parenteral 

Nutrition/Amino Acids/Dextrose/ Fat Emulsion Intravenous 1,400 ml @  58.333 mls/

hr TPN  CONT IV  Last administered on 4/12/20at 21:54;  Start 4/12/20 at 22:00; 

Stop 4/13/20 at 21:59;  Status DC


Sodium Chloride 1,000 ml @  1,000 mls/hr Q1H PRN IV hypotension;  Start 4/13/20 

at 09:35;  Stop 4/13/20 at 15:34;  Status DC


Albumin Human 200 ml @  200 mls/hr 1X PRN  PRN IV Hypotension;  Start 4/13/20 at

09:45;  Stop 4/13/20 at 15:44;  Status DC


Diphenhydramine HCl (Benadryl) 25 mg 1X PRN  PRN IV ITCHING;  Start 4/13/20 at 

09:45;  Stop 4/14/20 at 09:44;  Status DC


Diphenhydramine HCl (Benadryl) 25 mg 1X PRN  PRN IV ITCHING;  Start 4/13/20 at 

09:45;  Stop 4/14/20 at 09:44;  Status DC


Sodium Chloride 1,000 ml @  400 mls/hr Q2H30M PRN IV PATENCY;  Start 4/13/20 at 

09:35;  Stop 4/13/20 at 21:34;  Status DC


Info (PHARMACY MONITORING -- do not chart) 1 each PRN DAILY  PRN MC SEE 

COMMENTS;  Start 4/13/20 at 09:45;  Status Cancel


Sodium Chloride 100 meq/Potassium Phosphate 19 mmol/ Magnesium Sulfate 12 

meq/Calcium Gluconate 15 meq/ Multivitamins 10 ml/Chromium/ Copper/Manganese/ 

Seleni/Zn 0.5 ml/ Insulin Human Regular 40 unit/ Potassium Chloride 20 meq/ 

Total Parenteral Nutrition/Amino Acids/Dextrose/ Fat Emulsion Intravenous 1,400 

ml @  58.333 mls/ hr TPN  CONT IV  Last administered on 4/13/20at 22:02;  Start 

4/13/20 at 22:00;  Stop 4/14/20 at 21:59;  Status DC


Furosemide (Lasix) 40 mg 1X  ONCE IVP  Last administered on 4/13/20at 14:39;  

Start 4/13/20 at 14:30;  Stop 4/13/20 at 14:31;  Status DC


Metronidazole 100 ml @  100 mls/hr Q8HRS IV  Last administered on 4/21/20at 

06:04;  Start 4/14/20 at 10:00;  Stop 4/21/20 at 08:10;  Status DC


Sodium Chloride 1,000 ml @  1,000 mls/hr Q1H PRN IV hypotension;  Start 4/14/20 

at 08:00;  Stop 4/14/20 at 13:59;  Status DC


Albumin Human 200 ml @  200 mls/hr 1X PRN  PRN IV Hypotension;  Start 4/14/20 at

08:00;  Stop 4/14/20 at 13:59;  Status DC


Sodium Chloride 1,000 ml @  400 mls/hr Q2H30M PRN IV PATENCY;  Start 4/14/20 at 

08:00;  Stop 4/14/20 at 19:59;  Status DC


Info (PHARMACY MONITORING -- do not chart) 1 each PRN DAILY  PRN MC SEE 

COMMENTS;  Start 4/14/20 at 11:30;  Status UNV


Info (PHARMACY MONITORING -- do not chart) 1 each PRN DAILY  PRN MC SEE 

COMMENTS;  Start 4/14/20 at 11:30;  Stop 4/16/20 at 12:13;  Status DC


Sodium Chloride 100 meq/Potassium Phosphate 19 mmol/ Magnesium Sulfate 12 

meq/Calcium Gluconate 15 meq/ Multivitamins 10 ml/Chromium/ Copper/Manganese/ 

Seleni/Zn 0.5 ml/ Insulin Human Regular 40 unit/ Potassium Chloride 20 meq/ 

Total Parenteral Nutrition/Amino Acids/Dextrose/ Fat Emulsion Intravenous 1,400 

ml @  58.333 mls/ hr TPN  CONT IV  Last administered on 4/14/20at 21:52;  Start 

4/14/20 at 22:00;  Stop 4/15/20 at 21:59;  Status DC


Sodium Chloride (Normal Saline Flush) 10 ml QSHIFT  PRN IV AFTER MEDS AND BLOOD 

DRAWS;  Start 4/14/20 at 15:00;  Stop 5/12/20 at 11:27;  Status DC


Sodium Chloride (Normal Saline Flush) 10 ml PRN Q5MIN  PRN IV AFTER MEDS AND 

BLOOD DRAWS;  Start 4/14/20 at 15:00;  Stop 8/1/20 at 10:12;  Status DC


Sodium Chloride (Normal Saline Flush) 20 ml PRN Q5MIN  PRN IV AFTER MEDS AND 

BLOOD DRAWS;  Start 4/14/20 at 15:00


Sodium Chloride 100 meq/Potassium Phosphate 19 mmol/ Magnesium Sulfate 12 

meq/Calcium Gluconate 15 meq/ Multivitamins 10 ml/Chromium/ Copper/Manganese/ 

Seleni/Zn 0.5 ml/ Insulin Human Regular 40 unit/ Potassium Chloride 20 meq/ 

Total Parenteral Nutrition/Amino Acids/Dextrose/ Fat Emulsion Intravenous 1,400 

ml @  58.333 mls/ hr TPN  CONT IV  Last administered on 4/15/20at 21:20;  Start 

4/15/20 at 22:00;  Stop 4/16/20 at 21:59;  Status DC


Lidocaine HCl (Buffered Lidocaine 1%) 3 ml STK-MED ONCE .ROUTE ;  Start 4/15/20 

at 13:16;  Stop 4/15/20 at 13:16;  Status DC


Lidocaine HCl (Buffered Lidocaine 1%) 6 ml 1X  ONCE INJ  Last administered on 

4/15/20at 13:45;  Start 4/15/20 at 13:30;  Stop 4/15/20 at 13:31;  Status DC


Albumin Human 100 ml @  100 mls/hr 1X  ONCE IV  Last administered on 4/15/20at 

15:41;  Start 4/15/20 at 15:00;  Stop 4/15/20 at 15:59;  Status DC


Albumin Human 50 ml @ 50 mls/hr 1X  ONCE IV  Last administered on 4/15/20at 

15:00;  Start 4/15/20 at 15:00;  Stop 4/15/20 at 15:59;  Status DC


Info (PHARMACY MONITORING -- do not chart) 1 each PRN DAILY  PRN MC SEE 

COMMENTS;  Start 4/16/20 at 11:30;  Status Cancel


Info (PHARMACY MONITORING -- do not chart) 1 each PRN DAILY  PRN MC SEE 

COMMENTS;  Start 4/16/20 at 11:30;  Status UNV


Sodium Chloride 100 meq/Potassium Phosphate 10 mmol/ Magnesium Sulfate 12 

meq/Calcium Gluconate 15 meq/ Multivitamins 10 ml/Chromium/ Copper/Manganese/ 

Seleni/Zn 0.5 ml/ Insulin Human Regular 35 unit/ Potassium Chloride 20 meq/ 

Total Parenteral Nutrition/Amino Acids/Dextrose/ Fat Emulsion Intravenous 1,400 

ml @  58.333 mls/ hr TPN  CONT IV  Last administered on 4/16/20at 22:10;  Start 

4/16/20 at 22:00;  Stop 4/17/20 at 21:59;  Status DC


Sodium Chloride 100 meq/Potassium Phosphate 5 mmol/ Magnesium Sulfate 12 

meq/Calcium Gluconate 15 meq/ Multivitamins 10 ml/Chromium/ Copper/Manganese/ 

Seleni/Zn 0.5 ml/ Insulin Human Regular 35 unit/ Potassium Chloride 20 meq/ 

Total Parenteral Nutrition/Amino Acids/Dextrose/ Fat Emulsion Intravenous 1,400 

ml @  58.333 mls/ hr TPN  CONT IV  Last administered on 4/17/20at 22:59;  Start 

4/17/20 at 22:00;  Stop 4/18/20 at 21:59;  Status DC


Sodium Chloride 1,000 ml @  1,000 mls/hr Q1H PRN IV hypotension;  Start 4/18/20 

at 08:27;  Stop 4/18/20 at 14:26;  Status DC


Albumin Human 200 ml @  200 mls/hr 1X PRN  PRN IV Hypotension Last administered 

on 4/18/20at 09:18;  Start 4/18/20 at 08:30;  Stop 4/18/20 at 14:29;  Status DC


Sodium Chloride 1,000 ml @  400 mls/hr Q2H30M PRN IV PATENCY;  Start 4/18/20 at 

08:27;  Stop 4/18/20 at 20:26;  Status DC


Info (PHARMACY MONITORING -- do not chart) 1 each PRN DAILY  PRN MC SEE 

COMMENTS;  Start 4/18/20 at 08:30;  Status Cancel


Info (PHARMACY MONITORING -- do not chart) 1 each PRN DAILY  PRN MC SEE 

COMMENTS;  Start 4/18/20 at 08:30;  Stop 4/26/20 at 13:10;  Status DC


Sodium Chloride 100 meq/Potassium Chloride 40 meq/ Magnesium Sulfate 15 

meq/Calcium Gluconate 15 meq/ Multivitamins 10 ml/Chromium/ Copper/Manganese/ 

Seleni/Zn 0.5 ml/ Insulin Human Regular 35 unit/ Total Parenteral Nutrition/Am

yas Acids/Dextrose/ Fat Emulsion Intravenous 1,400 ml @  58.333 mls/ hr TPN  

CONT IV  Last administered on 4/18/20at 22:00;  Start 4/18/20 at 22:00;  Stop 

4/19/20 at 21:59;  Status DC


Potassium Chloride/Water 100 ml @  100 mls/hr 1X  ONCE IV  Last administered on 

4/18/20at 17:28;  Start 4/18/20 at 14:45;  Stop 4/18/20 at 15:44;  Status DC


Sodium Chloride 100 meq/Potassium Chloride 40 meq/ Magnesium Sulfate 15 

meq/Calcium Gluconate 15 meq/ Multivitamins 10 ml/Chromium/ Copper/Manganese/ 

Seleni/Zn 0.5 ml/ Insulin Human Regular 35 unit/ Total Parenteral 

Nutrition/Amino Acids/Dextrose/ Fat Emulsion Intravenous 1,400 ml @  58.333 mls/

hr TPN  CONT IV  Last administered on 4/19/20at 22:46;  Start 4/19/20 at 22:00; 

Stop 4/20/20 at 21:59;  Status DC


Sodium Chloride 100 meq/Potassium Chloride 40 meq/ Magnesium Sulfate 20 

meq/Calcium Gluconate 15 meq/ Multivitamins 10 ml/Chromium/ Copper/Manganese/ 

Seleni/Zn 0.5 ml/ Insulin Human Regular 35 unit/ Total Parenteral 

Nutrition/Amino Acids/Dextrose/ Fat Emulsion Intravenous 1,400 ml @  58.333 mls/

hr TPN  CONT IV  Last administered on 4/20/20at 22:31;  Start 4/20/20 at 22:00; 

Stop 4/21/20 at 21:59;  Status DC


Fentanyl Citrate (Fentanyl 2ml Vial) 50 mcg PRN Q2HR  PRN IVP PAIN Last 

administered on 4/27/20at 13:32;  Start 4/20/20 at 21:00;  Stop 4/28/20 at 

12:53;  Status DC


Fentanyl Citrate (Fentanyl 2ml Vial) 25 mcg PRN Q2HR  PRN IVP PAIN;  Start 4/2 0/20 at 21:00;  Stop 4/28/20 at 12:54;  Status DC


Enoxaparin Sodium (Lovenox 100mg Syringe) 100 mg Q12HR SQ ;  Start 4/21/20 at 

21:00;  Status UNV


Amino Acids/ Glycerin/ Electrolytes 1,000 ml @  75 mls/hr [id number] IV ;  Start 

4/20/20 at 21:15;  Status UNV


Sodium Chloride 1,000 ml @  1,000 mls/hr Q1H PRN IV hypotension;  Start 4/21/20 

at 07:56;  Stop 4/21/20 at 13:55;  Status DC


Albumin Human 200 ml @  200 mls/hr 1X PRN  PRN IV Hypotension Last administered 

on 4/21/20at 08:40;  Start 4/21/20 at 08:00;  Stop 4/21/20 at 13:59;  Status DC


Sodium Chloride 1,000 ml @  400 mls/hr Q2H30M PRN IV PATENCY;  Start 4/21/20 at 

07:56;  Stop 4/21/20 at 19:55;  Status DC


Info (PHARMACY MONITORING -- do not chart) 1 each PRN DAILY  PRN MC SEE 

COMMENTS;  Start 4/21/20 at 08:00;  Status UNV


Info (PHARMACY MONITORING -- do not chart) 1 each PRN DAILY  PRN MC SEE 

COMMENTS;  Start 4/21/20 at 08:00;  Status UNV


Daptomycin 430 mg/ Sodium Chloride 50 ml @  100 mls/hr Q24H IV  Last 

administered on 4/21/20at 12:35;  Start 4/21/20 at 09:00;  Stop 4/21/20 at 

12:49;  Status DC


Sodium Chloride 100 meq/Potassium Chloride 40 meq/ Magnesium Sulfate 20 

meq/Calcium Gluconate 15 meq/ Multivitamins 10 ml/Chromium/ Copper/Manganese/ 

Seleni/Zn 0.5 ml/ Insulin Human Regular 35 unit/ Total Parenteral Nu

trition/Amino Acids/Dextrose/ Fat Emulsion Intravenous 1,400 ml @  58.333 mls/ 

hr TPN  CONT IV  Last administered on 4/21/20at 21:26;  Start 4/21/20 at 22:00; 

Stop 4/22/20 at 21:59;  Status DC


Daptomycin 430 mg/ Sodium Chloride 50 ml @  100 mls/hr Q48H IV ;  Start 4/23/20 

at 09:00;  Stop 4/22/20 at 11:55;  Status DC


Sodium Chloride 100 meq/Potassium Chloride 40 meq/ Magnesium Sulfate 20 

meq/Calcium Gluconate 15 meq/ Multivitamins 10 ml/Chromium/ Copper/Manganese/ 

Seleni/Zn 0.5 ml/ Insulin Human Regular 35 unit/ Total Parenteral 

Nutrition/Amino Acids/Dextrose/ Fat Emulsion Intravenous 1,400 ml @  58.333 mls/

hr TPN  CONT IV  Last administered on 4/22/20at 22:27;  Start 4/22/20 at 22:00; 

Stop 4/23/20 at 21:59;  Status DC


Daptomycin 430 mg/ Sodium Chloride 50 ml @  100 mls/hr Q24H IV  Last 

administered on 4/24/20at 15:07;  Start 4/22/20 at 13:00;  Stop 4/25/20 at 

13:15;  Status DC


Sodium Chloride 100 meq/Potassium Chloride 40 meq/ Magnesium Sulfate 20 

meq/Calcium Gluconate 10 meq/ Multivitamins 10 ml/Chromium/ Copper/Manganese/ 

Seleni/Zn 0.5 ml/ Insulin Human Regular 35 unit/ Total Parenteral 

Nutrition/Amino Acids/Dextrose/ Fat Emulsion Intravenous 1,400 ml @  58.333 mls/

hr TPN  CONT IV  Last administered on 4/24/20at 00:06;  Start 4/23/20 at 22:00; 

Stop 4/24/20 at 21:59;  Status DC


Alteplase, Recombinant (Cathflo For Central Catheter Clearance) 1 mg 1X  ONCE 

INT CAT  Last administered on 4/24/20at 11:44;  Start 4/24/20 at 10:45;  Stop 

4/24/20 at 10:46;  Status DC


Ondansetron HCl (Zofran) 4 mg PRN Q6HRS  PRN IV NAUSEA/VOMITING;  Start 4/27/20 

at 07:00;  Stop 4/28/20 at 06:59;  Status DC


Fentanyl Citrate (Fentanyl 2ml Vial) 25 mcg PRN Q5MIN  PRN IV MILD PAIN 1-3;  

Start 4/27/20 at 07:00;  Stop 4/28/20 at 06:59;  Status DC


Fentanyl Citrate (Fentanyl 2ml Vial) 50 mcg PRN Q5MIN  PRN IV MODERATE TO SEVERE

PAIN Last administered on 4/27/20at 10:17;  Start 4/27/20 at 07:00;  Stop 

4/28/20 at 06:59;  Status DC


Ringer's Solution 1,000 ml @  30 mls/hr Q24H IV ;  Start 4/27/20 at 07:00;  Stop

4/27/20 at 18:59;  Status DC


Lidocaine HCl (Xylocaine-Mpf 1% 2ml Vial) 2 ml PRN 1X  PRN ID PRIOR TO IV START;

 Start 4/27/20 at 07:00;  Stop 4/28/20 at 06:59;  Status DC


Prochlorperazine Edisylate (Compazine) 5 mg PACU PRN  PRN IV NAUSEA, MRX1;  

Start 4/27/20 at 07:00;  Stop 4/28/20 at 06:59;  Status DC


Sodium Acetate 50 meq/Potassium Acetate 55 meq/ Magnesium Sulfate 20 meq/Calcium

Gluconate 10 meq/ Multivitamins 10 ml/Chromium/ Copper/Manganese/ Seleni/Zn 0.5 

ml/ Insulin Human Regular 35 unit/ Total Parenteral Nutrition/Amino Acids/Dex

trose/ Fat Emulsion Intravenous 1,400 ml @  58.333 mls/ hr TPN  CONT IV ;  Start

4/24/20 at 22:00;  Stop 4/24/20 at 14:15;  Status DC


Sodium Acetate 50 meq/Potassium Acetate 55 meq/ Magnesium Sulfate 20 meq/Calcium

Gluconate 10 meq/ Multivitamins 10 ml/Chromium/ Copper/Manganese/ Seleni/Zn 0.5 

ml/ Insulin Human Regular 35 unit/ Total Parenteral Nutrition/Amino 

Acids/Dextrose/ Fat Emulsion Intravenous 1,800 ml @  75 mls/hr TPN  CONT IV  

Last administered on 4/24/20at 22:38;  Start 4/24/20 at 22:00;  Stop 4/25/20 at 

21:59;  Status DC


Sodium Chloride 1,000 ml @  1,000 mls/hr Q1H PRN IV hypotension;  Start 4/24/20 

at 15:31;  Stop 4/24/20 at 21:30;  Status DC


Diphenhydramine HCl (Benadryl) 25 mg 1X PRN  PRN IV ITCHING;  Start 4/24/20 at 

15:45;  Stop 4/25/20 at 15:44;  Status DC


Diphenhydramine HCl (Benadryl) 25 mg 1X PRN  PRN IV ITCHING;  Start 4/24/20 at 

15:45;  Stop 4/25/20 at 15:44;  Status DC


Sodium Chloride 1,000 ml @  400 mls/hr Q2H30M PRN IV PATENCY;  Start 4/24/20 at 

15:31;  Stop 4/25/20 at 03:30;  Status DC


Info (PHARMACY MONITORING -- do not chart) 1 each PRN DAILY  PRN MC SEE 

COMMENTS;  Start 4/24/20 at 15:45;  Stop 5/26/20 at 14:14;  Status DC


Sodium Acetate 50 meq/Potassium Acetate 55 meq/ Magnesium Sulfate 20 meq/Calcium

Gluconate 10 meq/ Multivitamins 10 ml/Chromium/ Copper/Manganese/ Seleni/Zn 0.5 

ml/ Insulin Human Regular 35 unit/ Total Parenteral Nutrition/Amino Acids/Dext

verenice/ Fat Emulsion Intravenous 1,800 ml @  75 mls/hr TPN  CONT IV  Last 

administered on 4/25/20at 22:03;  Start 4/25/20 at 22:00;  Stop 4/26/20 at 

21:59;  Status DC


Daptomycin 430 mg/ Sodium Chloride 50 ml @  100 mls/hr Q24H IV  Last 

administered on 4/30/20at 13:00;  Start 4/25/20 at 13:00;  Stop 4/30/20 at 

20:58;  Status DC


Heparin Sodium (Porcine) 1000 unit/Sodium Chloride 1,001 ml @  1,001 mls/hr 1X  

ONCE IRR ;  Start 4/27/20 at 06:00;  Stop 4/27/20 at 06:59;  Status DC


Potassium Acetate 55 meq/Magnesium Sulfate 20 meq/ Calcium Gluconate 10 meq/ 

Multivitamins 10 ml/Chromium/ Copper/Manganese/ Seleni/Zn 0.5 ml/ Insulin Human 

Regular 35 unit/ Total Parenteral Nutrition/Amino Acids/Dextrose/ Fat Emulsion 

Intravenous 1,920 ml @  80 mls/hr TPN  CONT IV  Last administered on 4/26/20at 

22:10;  Start 4/26/20 at 22:00;  Stop 4/27/20 at 21:59;  Status DC


Dexamethasone Sodium Phosphate (Decadron) 4 mg STK-MED ONCE .ROUTE ;  Start 

4/27/20 at 10:56;  Stop 4/27/20 at 10:57;  Status DC


Ondansetron HCl (Zofran) 4 mg STK-MED ONCE .ROUTE ;  Start 4/27/20 at 10:56;  

Stop 4/27/20 at 10:57;  Status DC


Rocuronium Bromide (Zemuron) 50 mg STK-MED ONCE .ROUTE ;  Start 4/27/20 at 

10:56;  Stop 4/27/20 at 10:57;  Status DC


Fentanyl Citrate (Fentanyl 2ml Vial) 100 mcg STK-MED ONCE .ROUTE ;  Start 

4/27/20 at 10:56;  Stop 4/27/20 at 10:57;  Status DC


Bupivacaine HCl/ Epinephrine Bitart (Sensorcain-Epi 0.5%-1:032236 Mpf) 30 ml 

STK-MED ONCE .ROUTE  Last administered on 4/27/20at 12:01;  Start 4/27/20 at 

10:58;  Stop 4/27/20 at 10:58;  Status DC


Cellulose (Surgicel Hemostat 2x14) 1 each STK-MED ONCE .ROUTE ;  Start 4/27/20 

at 10:58;  Stop 4/27/20 at 10:59;  Status DC


Iohexol (Omnipaque 300 Mg/ml) 50 ml STK-MED ONCE .ROUTE ;  Start 4/27/20 at 

10:58;  Stop 4/27/20 at 10:59;  Status DC


Cellulose (Surgicel Hemostat 4x8) 1 each STK-MED ONCE .ROUTE ;  Start 4/27/20 at

10:58;  Stop 4/27/20 at 10:59;  Status DC


Bisacodyl (Dulcolax Supp) 10 mg STK-MED ONCE .ROUTE ;  Start 4/27/20 at 10:59;  

Stop 4/27/20 at 10:59;  Status DC


Heparin Sodium (Porcine) 1000 unit/Sodium Chloride 1,001 ml @  1,001 mls/hr 1X  

ONCE IRR ;  Start 4/27/20 at 12:00;  Stop 4/27/20 at 12:59;  Status DC


Propofol 20 ml @ As Directed STK-MED ONCE IV ;  Start 4/27/20 at 11:05;  Stop 

4/27/20 at 11:05;  Status DC


Sevoflurane (Ultane) 90 ml STK-MED ONCE IH ;  Start 4/27/20 at 11:05;  Stop 

4/27/20 at 11:05;  Status DC


Sevoflurane (Ultane) 60 ml STK-MED ONCE IH ;  Start 4/27/20 at 12:26;  Stop 

4/27/20 at 12:27;  Status DC


Propofol 20 ml @ As Directed STK-MED ONCE IV ;  Start 4/27/20 at 12:26;  Stop 

4/27/20 at 12:27;  Status DC


Phenylephrine HCl (PHENYLEPHRINE in 0.9% NACL PF) 1 mg STK-MED ONCE IV ;  Start 

4/27/20 at 12:34;  Stop 4/27/20 at 12:34;  Status DC


Heparin Sodium (Porcine) (Heparin Sodium) 5,000 unit Q12HR SQ  Last administered

on 5/6/20at 20:57;  Start 4/27/20 at 21:00;  Stop 5/7/20 at 09:59;  Status DC


Sodium Chloride (Normal Saline Flush) 3 ml QSHIFT  PRN IV AFTER MEDS AND BLOOD 

DRAWS;  Start 4/27/20 at 13:45;  Status Cancel


Naloxone HCl (Narcan) 0.4 mg PRN Q2MIN  PRN IV SEE INSTRUCTIONS Last 

administered on 6/6/20at 15:15;  Start 4/27/20 at 13:45;  Stop 7/1/20 at 16:00; 

Status DC


Sodium Chloride 1,000 ml @  25 mls/hr Q24H IV  Last administered on 5/26/20at 

13:37;  Start 4/27/20 at 13:37;  Stop 5/29/20 at 13:09;  Status DC


Naloxone HCl (Narcan) 0.4 mg PRN Q2MIN  PRN IV SEE INSTRUCTIONS;  Start 4/27/20 

at 14:30;  Status UNV


Sodium Chloride 1,000 ml @  25 mls/hr Q24H IV ;  Start 4/27/20 at 14:30;  Status

UNV


Hydromorphone HCl 30 ml @ 0 mls/hr CONT PRN  PRN IV PER PROTOCOL Last administe

red on 5/2/20at 16:08;  Start 4/27/20 at 14:30;  Stop 5/4/20 at 08:55;  Status 

DC


Potassium Acetate 55 meq/Magnesium Sulfate 20 meq/ Calcium Gluconate 10 meq/ 

Multivitamins 10 ml/Chromium/ Copper/Manganese/ Seleni/Zn 0.5 ml/ Insulin Human 

Regular 35 unit/ Total Parenteral Nutrition/Amino Acids/Dextrose/ Fat Emulsion 

Intravenous 1,920 ml @  80 mls/hr TPN  CONT IV  Last administered on 4/27/20at 

22:01;  Start 4/27/20 at 22:00;  Stop 4/28/20 at 21:59;  Status DC


Bumetanide (Bumex) 2 mg [id number] IV  Last administered on 5/1/20at 13:50;  Start 

4/28/20 at 14:00;  Stop 5/2/20 at 14:10;  Status DC


Meropenem 1 gm/ Sodium Chloride 100 ml @  200 mls/hr Q8HRS IV  Last administered

on 5/22/20at 05:53;  Start 4/28/20 at 14:00;  Stop 5/22/20 at 09:31;  Status DC


Potassium Acetate 55 meq/Magnesium Sulfate 20 meq/ Calcium Gluconate 10 meq/ 

Multivitamins 10 ml/Chromium/ Copper/Manganese/ Seleni/Zn 0.5 ml/ Insulin Human 

Regular 35 unit/ Total Parenteral Nutrition/Amino Acids/Dextrose/ Fat Emulsion 

Intravenous 1,920 ml @  80 mls/hr TPN  CONT IV  Last administered on 4/28/20at 

22:02;  Start 4/28/20 at 22:00;  Stop 4/29/20 at 21:59;  Status DC


Hydromorphone HCl (Dilaudid Standard PCA) 12 mg STK-MED ONCE IV ;  Start 4/27/20

at 14:35;  Stop 4/28/20 at 13:53;  Status DC


Artificial Tears (Artificial Tears) 1 drop PRN Q15MIN  PRN OU DRY EYE Last 

administered on 6/23/20at 21:17;  Start 4/29/20 at 05:30


Hydromorphone HCl (Dilaudid Standard PCA) 12 mg STK-MED ONCE IV ;  Start 4/28/20

at 12:05;  Stop 4/29/20 at 09:15;  Status DC


Potassium Acetate 65 meq/Magnesium Sulfate 20 meq/ Calcium Gluconate 10 meq/ 

Multivitamins 10 ml/Chromium/ Copper/Manganese/ Seleni/Zn 0.5 ml/ Insulin Human 

Regular 30 unit/ Total Parenteral Nutrition/Amino Acids/Dextrose/ Fat Emulsion 

Intravenous 1,920 ml @  80 mls/hr TPN  CONT IV  Last administered on 4/29/20at 

22:22;  Start 4/29/20 at 22:00;  Stop 4/30/20 at 21:59;  Status DC


Cyclobenzaprine HCl (Flexeril) 10 mg PRN Q6HRS  PRN PO MUSCLE SPASMS Last 

administered on 7/10/20at 19:12;  Start 4/30/20 at 10:45


Potassium Acetate 55 meq/Magnesium Sulfate 20 meq/ Calcium Gluconate 10 meq/ 

Multivitamins 10 ml/Chromium/ Copper/Manganese/ Seleni/Zn 0.5 ml/ Insulin Human 

Regular 30 unit/ Total Parenteral Nutrition/Amino Acids/Dextrose/ Fat Emulsion 

Intravenous 1,920 ml @  80 mls/hr TPN  CONT IV  Last administered on 5/1/20at 

01:00;  Start 4/30/20 at 22:00;  Stop 5/1/20 at 21:59;  Status DC


Magnesium Sulfate 50 ml @ 25 mls/hr 1X  ONCE IV  Last administered on 4/30/20at 

17:18;  Start 4/30/20 at 12:45;  Stop 4/30/20 at 14:44;  Status DC


Potassium Chloride/Water 100 ml @  100 mls/hr 1X  ONCE IV  Last administered on 

5/1/20at 11:27;  Start 5/1/20 at 12:00;  Stop 5/1/20 at 12:59;  Status DC


Hydromorphone HCl (Dilaudid Standard PCA) 12 mg STK-MED ONCE IV ;  Start 4/29/20

at 10:50;  Stop 5/1/20 at 11:02;  Status DC


Hydromorphone HCl (Dilaudid Standard PCA) 12 mg STK-MED ONCE IV ;  Start 4/30/20

at 13:47;  Stop 5/1/20 at 11:03;  Status DC


Potassium Acetate 30 meq/Magnesium Sulfate 20 meq/ Calcium Gluconate 10 meq/ 

Multivitamins 10 ml/Chromium/ Copper/Manganese/ Seleni/Zn 0.5 ml/ Insulin Human 

Regular 30 unit/ Potassium Chloride 30 meq/ Total Parenteral Nutrition/Amino A

cids/Dextrose/ Fat Emulsion Intravenous 1,920 ml @  80 mls/hr TPN  CONT IV  Last

administered on 5/1/20at 22:34;  Start 5/1/20 at 22:00;  Stop 5/2/20 at 21:59;  

Status DC


Potassium Chloride/Water 100 ml @  100 mls/hr Q1H IV  Last administered on 

5/2/20at 13:05;  Start 5/2/20 at 07:00;  Stop 5/2/20 at 10:59;  Status DC


Magnesium Sulfate 50 ml @ 25 mls/hr 1X  ONCE IV  Last administered on 5/2/20at 

10:34;  Start 5/2/20 at 10:30;  Stop 5/2/20 at 12:29;  Status DC


Potassium Chloride 75 meq/ Magnesium Sulfate 20 meq/Calcium Gluconate 10 meq/ 

Multivitamins 10 ml/Chromium/ Copper/Manganese/ Seleni/Zn 0.5 ml/ Insulin Human 

Regular 30 unit/ Total Parenteral Nutrition/Amino Acids/Dextrose/ Fat Emulsion 

Intravenous 1,920 ml @  80 mls/hr TPN  CONT IV  Last administered on 5/2/20at 

21:51;  Start 5/2/20 at 22:00;  Stop 5/3/20 at 22:00;  Status DC


Potassium Chloride 75 meq/ Magnesium Sulfate 20 meq/Calcium Gluconate 10 meq/ 

Multivitamins 10 ml/Chromium/ Copper/Manganese/ Seleni/Zn 0.5 ml/ Insulin Human 

Regular 25 unit/ Total Parenteral Nutrition/Amino Acids/Dextrose/ Fat Emulsion 

Intravenous 1,920 ml @  80 mls/hr TPN  CONT IV  Last administered on 5/3/20at 

22:04;  Start 5/3/20 at 22:00;  Stop 5/4/20 at 21:59;  Status DC


Hydromorphone HCl (Dilaudid) 0.4 mg PRN Q4HRS  PRN IVP PAIN Last administered on

5/4/20at 10:57;  Start 5/4/20 at 09:00;  Stop 5/4/20 at 18:59;  Status DC


Micafungin Sodium 100 mg/Dextrose 100 ml @  100 mls/hr Q24H IV  Last 

administered on 5/26/20at 12:17;  Start 5/4/20 at 11:00;  Stop 5/27/20 at 09:59;

 Status DC


Daptomycin 485 mg/ Sodium Chloride 50 ml @  100 mls/hr Q24H IV  Last 

administered on 5/11/20at 13:10;  Start 5/4/20 at 11:00;  Stop 5/12/20 at 07:44;

 Status DC


Potassium Chloride 75 meq/ Magnesium Sulfate 15 meq/Calcium Gluconate 8 meq/ 

Multivitamins 10 ml/Chromium/ Copper/Manganese/ Seleni/Zn 0.5 ml/ Insulin Human 

Regular 25 unit/ Total Parenteral Nutrition/Amino Acids/Dextrose/ Fat Emulsion 

Intravenous 1,920 ml @  80 mls/hr TPN  CONT IV  Last administered on 5/4/20at 

23:08;  Start 5/4/20 at 22:00;  Stop 5/5/20 at 21:59;  Status DC


Haloperidol Lactate (Haldol Inj) 3 mg 1X  ONCE IVP  Last administered on 

5/4/20at 14:37;  Start 5/4/20 at 14:30;  Stop 5/4/20 at 14:31;  Status DC


Hydromorphone HCl (Dilaudid) 1 mg PRN Q4HRS  PRN IVP PAIN Last administered on 

5/18/20at 06:25;  Start 5/4/20 at 19:00;  Stop 5/18/20 at 17:10;  Status DC


Potassium Chloride 75 meq/ Magnesium Sulfate 15 meq/Calcium Gluconate 8 meq/ 

Multivitamins 10 ml/Chromium/ Copper/Manganese/ Seleni/Zn 0.5 ml/ Insulin Human 

Regular 20 unit/ Total Parenteral Nutrition/Amino Acids/Dextrose/ Fat Emulsion 

Intravenous 1,920 ml @  80 mls/hr TPN  CONT IV  Last administered on 5/5/20at 

22:10;  Start 5/5/20 at 22:00;  Stop 5/6/20 at 21:59;  Status DC


Lidocaine HCl (Buffered Lidocaine 1%) 3 ml STK-MED ONCE .ROUTE ;  Start 5/6/20 

at 11:31;  Stop 5/6/20 at 11:31;  Status DC


Lidocaine HCl (Buffered Lidocaine 1%) 3 ml STK-MED ONCE .ROUTE ;  Start 5/6/20 

at 12:28;  Stop 5/6/20 at 12:29;  Status DC


Lidocaine HCl (Buffered Lidocaine 1%) 6 ml 1X  ONCE INJ  Last administered on 

5/6/20at 12:53;  Start 5/6/20 at 12:45;  Stop 5/6/20 at 12:46;  Status DC


Potassium Chloride 75 meq/ Magnesium Sulfate 15 meq/Calcium Gluconate 8 meq/ 

Multivitamins 10 ml/Chromium/ Copper/Manganese/ Seleni/Zn 0.5 ml/ Insulin Human 

Regular 20 unit/ Total Parenteral Nutrition/Amino Acids/Dextrose/ Fat Emulsion 

Intravenous 1,920 ml @  80 mls/hr TPN  CONT IV  Last administered on 5/6/20at 

22:00;  Start 5/6/20 at 22:00;  Stop 5/7/20 at 21:59;  Status DC


Potassium Chloride 75 meq/ Magnesium Sulfate 15 meq/Calcium Gluconate 8 meq/ 

Multivitamins 10 ml/Chromium/ Copper/Manganese/ Seleni/Zn 0.5 ml/ Insulin Human 

Regular 15 unit/ Total Parenteral Nutrition/Amino Acids/Dextrose/ Fat Emulsion 

Intravenous 1,920 ml @  80 mls/hr TPN  CONT IV  Last administered on 5/7/20at 

22:28;  Start 5/7/20 at 22:00;  Stop 5/8/20 at 21:59;  Status DC


Vecuronium Bromide (Norcuron Bolus) 6 mg PRN Q6HRS  PRN IV VENT ASYNCHRONY;  

Start 5/7/20 at 19:15;  Stop 5/7/20 at 19:35;  Status DC


Bumetanide (Bumex) 2 mg 1X  ONCE IV  Last administered on 5/7/20at 22:09;  Start

5/7/20 at 19:45;  Stop 5/7/20 at 19:46;  Status DC


Lidocaine HCl (Buffered Lidocaine 1%) 3 ml STK-MED ONCE .ROUTE ;  Start 5/8/20 

at 07:59;  Stop 5/8/20 at 07:59;  Status DC


Midazolam HCl (Versed) 5 mg STK-MED ONCE .ROUTE ;  Start 5/8/20 at 08:36;  Stop 

5/8/20 at 08:36;  Status DC


Fentanyl Citrate (Fentanyl 5ml Vial) 250 mcg STK-MED ONCE .ROUTE ;  Start 5/8/20

at 08:36;  Stop 5/8/20 at 08:37;  Status DC


Lidocaine HCl (Buffered Lidocaine 1%) 3 ml 1X  ONCE IJ  Last administered on 

5/8/20at 09:30;  Start 5/8/20 at 09:15;  Stop 5/8/20 at 09:16;  Status DC


Midazolam HCl (Versed) 5 mg 1X  ONCE IV  Last administered on 5/8/20at 09:30;  

Start 5/8/20 at 09:15;  Stop 5/8/20 at 09:16;  Status DC


Fentanyl Citrate (Fentanyl 5ml Vial) 250 mcg 1X  ONCE IV  Last administered on 

5/8/20at 09:30;  Start 5/8/20 at 09:15;  Stop 5/8/20 at 09:16;  Status DC


Bumetanide (Bumex) 2 mg DAILY IV  Last administered on 5/18/20at 08:07;  Start 

5/8/20 at 10:00;  Stop 5/18/20 at 17:15;  Status DC


Potassium Chloride 75 meq/ Magnesium Sulfate 15 meq/ Multivitamins 10 

ml/Chromium/ Copper/Manganese/ Seleni/Zn 0.5 ml/ Insulin Human Regular 15 unit/ 

Total Parenteral Nutrition/Amino Acids/Dextrose/ Fat Emulsion Intravenous 1,920 

ml @  80 mls/hr TPN  CONT IV  Last administered on 5/8/20at 21:59;  Start 5/8/20

at 22:00;  Stop 5/9/20 at 21:59;  Status DC


Metoclopramide HCl (Reglan Vial) 10 mg PRN Q3HRS  PRN IVP NAUSEA/VOMITING-3rd 

choice Last administered on 5/14/20at 04:25;  Start 5/9/20 at 16:45


Potassium Chloride 75 meq/ Magnesium Sulfate 15 meq/ Multivitamins 10 

ml/Chromium/ Copper/Manganese/ Seleni/Zn 0.5 ml/ Insulin Human Regular 15 unit/ 

Total Parenteral Nutrition/Amino Acids/Dextrose/ Fat Emulsion Intravenous 1,920 

ml @  80 mls/hr TPN  CONT IV  Last administered on 5/9/20at 22:41;  Start 5/9/20

at 22:00;  Stop 5/10/20 at 21:59;  Status DC


Magnesium Sulfate 50 ml @ 25 mls/hr 1X  ONCE IV  Last administered on 5/10/20at 

10:44;  Start 5/10/20 at 09:00;  Stop 5/10/20 at 10:59;  Status DC


Potassium Chloride/Water 100 ml @  100 mls/hr 1X  ONCE IV  Last administered on 

5/10/20at 09:37;  Start 5/10/20 at 09:00;  Stop 5/10/20 at 09:59;  Status DC


Duloxetine HCl (Cymbalta) 30 mg DAILY PO  Last administered on 5/11/20at 09:48; 

Start 5/10/20 at 14:00;  Stop 5/13/20 at 10:25;  Status DC


Potassium Chloride 80 meq/ Magnesium Sulfate 20 meq/ Multivitamins 10 

ml/Chromium/ Copper/Manganese/ Seleni/Zn 0.5 ml/ Insulin Human Regular 15 unit/ 

Total Parenteral Nutrition/Amino Acids/Dextrose/ Fat Emulsion Intravenous 1,920 

ml @  80 mls/hr TPN  CONT IV  Last administered on 5/10/20at 21:42;  Start 

5/10/20 at 22:00;  Stop 5/11/20 at 21:59;  Status DC


Potassium Chloride 80 meq/ Magnesium Sulfate 20 meq/ Multivitamins 10 

ml/Chromium/ Copper/Manganese/ Seleni/Zn 0.5 ml/ Insulin Human Regular 15 unit/ 

Total Parenteral Nutrition/Amino Acids/Dextrose/ Fat Emulsion Intravenous 1,920 

ml @  80 mls/hr TPN  CONT IV  Last administered on 5/11/20at 22:20;  Start 

5/11/20 at 22:00;  Stop 5/12/20 at 21:59;  Status DC


Lidocaine HCl (Buffered Lidocaine 1%) 3 ml STK-MED ONCE .ROUTE ;  Start 5/12/20 

at 09:54;  Stop 5/12/20 at 09:55;  Status DC


Hydromorphone HCl (Dilaudid Standard PCA) 12 mg STK-MED ONCE IV ;  Start 5/1/20 

at 15:50;  Stop 5/12/20 at 11:24;  Status DC


Potassium Chloride 80 meq/ Magnesium Sulfate 20 meq/ Multivitamins 10 

ml/Chromium/ Copper/Manganese/ Seleni/Zn 0.5 ml/ Insulin Human Regular 15 unit/ 

Total Parenteral Nutrition/Amino Acids/Dextrose/ Fat Emulsion Intravenous 1,920 

ml @  80 mls/hr TPN  CONT IV  Last administered on 5/12/20at 21:40;  Start 

5/12/20 at 22:00;  Stop 5/13/20 at 21:59;  Status DC


Lidocaine HCl (Buffered Lidocaine 1%) 6 ml 1X  ONCE INJ  Last administered on 

5/12/20at 14:15;  Start 5/12/20 at 14:15;  Stop 5/12/20 at 14:16;  Status DC


Potassium Chloride 80 meq/ Magnesium Sulfate 20 meq/ Multivitamins 10 

ml/Chromium/ Copper/Manganese/ Seleni/Zn 1 ml/ Insulin Human Regular 15 unit/ 

Total Parenteral Nutrition/Amino Acids/Dextrose/ Fat Emulsion Intravenous 1,920 

ml @  80 mls/hr TPN  CONT IV  Last administered on 5/13/20at 22:04;  Start 

5/13/20 at 22:00;  Stop 5/14/20 at 21:59;  Status DC


Potassium Chloride/Water 100 ml @  100 mls/hr 1X  ONCE IV  Last administered on 

5/14/20at 11:34;  Start 5/14/20 at 11:00;  Stop 5/14/20 at 11:59;  Status DC


Potassium Chloride 90 meq/ Magnesium Sulfate 20 meq/ Multivitamins 10 

ml/Chromium/ Copper/Manganese/ Seleni/Zn 1 ml/ Insulin Human Regular 15 unit/ 

Total Parenteral Nutrition/Amino Acids/Dextrose/ Fat Emulsion Intravenous 1,920 

ml @  80 mls/hr TPN  CONT IV  Last administered on 5/14/20at 22:57;  Start 

5/14/20 at 22:00;  Stop 5/15/20 at 21:59;  Status DC


Potassium Chloride 90 meq/ Magnesium Sulfate 20 meq/ Multivitamins 10 ml/

Chromium/ Copper/Manganese/ Seleni/Zn 1 ml/ Insulin Human Regular 15 unit/ Total

Parenteral Nutrition/Amino Acids/Dextrose/ Fat Emulsion Intravenous 1,920 ml @  

80 mls/hr TPN  CONT IV  Last administered on 5/15/20at 22:48;  Start 5/15/20 at 

22:00;  Stop 5/16/20 at 21:59;  Status DC


Potassium Chloride 90 meq/ Magnesium Sulfate 20 meq/ Multivitamins 10 

ml/Chromium/ Copper/Manganese/ Seleni/Zn 1 ml/ Insulin Human Regular 15 unit/ 

Total Parenteral Nutrition/Amino Acids/Dextrose/ Fat Emulsion Intravenous 1,890 

ml @  78.75 mls/ hr TPN  CONT IV  Last administered on 5/16/20at 22:15;  Start 

5/16/20 at 22:00;  Stop 5/17/20 at 21:59;  Status DC


Linezolid/Dextrose 300 ml @  300 mls/hr Q12HR IV  Last administered on 5/19/20at

21:08;  Start 5/17/20 at 09:00;  Stop 5/20/20 at 08:11;  Status DC


Daptomycin 450 mg/ Sodium Chloride 50 ml @  100 mls/hr Q24H IV  Last 

administered on 5/20/20at 09:25;  Start 5/17/20 at 09:00;  Stop 5/21/20 at 

08:30;  Status DC


Potassium Chloride 90 meq/ Magnesium Sulfate 20 meq/ Multivitamins 10 m

l/Chromium/ Copper/Manganese/ Seleni/Zn 1 ml/ Insulin Human Regular 15 unit/ 

Total Parenteral Nutrition/Amino Acids/Dextrose/ Fat Emulsion Intravenous 1,890 

ml @  78.75 mls/ hr TPN  CONT IV  Last administered on 5/17/20at 21:34;  Start 

5/17/20 at 22:00;  Stop 5/18/20 at 21:59;  Status DC


Lorazepam (Ativan Inj) 2 mg STK-MED ONCE .ROUTE ;  Start 5/17/20 at 14:58;  Stop

5/17/20 at 14:58;  Status DC


Metoprolol Tartrate (Lopressor Vial) 5 mg 1X  ONCE IVP  Last administered on 

5/17/20at 15:31;  Start 5/17/20 at 15:15;  Stop 5/17/20 at 15:16;  Status DC


Lorazepam (Ativan Inj) 2 mg 1X  ONCE IVP  Last administered on 5/17/20at 15:30; 

Start 5/17/20 at 15:15;  Stop 5/17/20 at 15:16;  Status DC


Enoxaparin Sodium (Lovenox 40mg Syringe) 40 mg Q24H SQ  Last administered on 

6/5/20at 17:44;  Start 5/17/20 at 17:00;  Stop 6/7/20 at 06:50;  Status DC


Lorazepam (Ativan Inj) 1 mg PRN Q4HRS  PRN IVP ANXIETY / AGITATION MILD-MOD Last

administered on 5/31/20at 15:55;  Start 5/17/20 at 19:15;  Stop 6/2/20 at 11:45;

 Status DC


Lorazepam (Ativan Inj) 2 mg PRN Q4HRS  PRN IVP ANXIETY / AGITATION SEVERE Last 

administered on 6/1/20at 07:55;  Start 5/17/20 at 19:15;  Stop 6/2/20 at 11:45; 

Status DC


Fentanyl Citrate (Fentanyl 2ml Vial) 50 mcg PRN Q4HRS  PRN IVP SEVERE PAIN Last 

administered on 6/13/20at 05:15;  Start 5/18/20 at 13:15;  Stop 6/14/20 at 

09:29;  Status DC


Fentanyl Citrate (Fentanyl 2ml Vial) 25 mcg PRN Q4HRS  PRN IVP MODERATE PAIN 

Last administered on 6/13/20at 00:27;  Start 5/18/20 at 13:15;  Stop 6/14/20 at 

09:30;  Status DC


Potassium Chloride 90 meq/ Magnesium Sulfate 20 meq/ Multivitamins 10 

ml/Chromium/ Copper/Manganese/ Seleni/Zn 1 ml/ Insulin Human Regular 15 unit/ 

Total Parenteral Nutrition/Amino Acids/Dextrose/ Fat Emulsion Intravenous 1,890 

ml @  78.75 mls/ hr TPN  CONT IV  Last administered on 5/18/20at 22:18;  Start 

5/18/20 at 22:00;  Stop 5/19/20 at 21:59;  Status DC


Furosemide (Lasix) 40 mg 1X  ONCE IVP  Last administered on 5/18/20at 21:51;  

Start 5/18/20 at 21:45;  Stop 5/18/20 at 21:48;  Status DC


Albumin Human 100 ml @  100 mls/hr 1X PRN  PRN IV SEE COMMENTS;  Start 5/19/20 

at 01:30;  Stop 8/3/20 at 09:52;  Status DC


Furosemide (Lasix) 40 mg BID92 IVP  Last administered on 6/3/20at 08:04;  Start 

5/19/20 at 14:00;  Stop 6/3/20 at 13:07;  Status DC


Potassium Chloride 90 meq/ Magnesium Sulfate 20 meq/ Multivitamins 10 

ml/Chromium/ Copper/Manganese/ Seleni/Zn 1 ml/ Insulin Human Regular 15 unit/ 

Total Parenteral Nutrition/Amino Acids/Dextrose/ Fat Emulsion Intravenous 1,800 

ml @  75 mls/hr TPN  CONT IV  Last administered on 5/19/20at 22:31;  Start 

5/19/20 at 22:00;  Stop 5/20/20 at 21:59;  Status DC


Potassium Chloride 90 meq/ Magnesium Sulfate 20 meq/ Multivitamins 10 

ml/Chromium/ Copper/Manganese/ Seleni/Zn 1 ml/ Insulin Human Regular 15 unit/ 

Total Parenteral Nutrition/Amino Acids/Dextrose/ Fat Emulsion Intravenous 1,800 

ml @  75 mls/hr TPN  CONT IV  Last administered on 5/20/20at 22:28;  Start 

5/20/20 at 22:00;  Stop 5/21/20 at 21:59;  Status DC


Potassium Chloride 110 meq/ Magnesium Sulfate 20 meq/ Multivitamins 10 m

l/Chromium/ Copper/Manganese/ Seleni/Zn 1 ml/ Insulin Human Regular 15 unit/ 

Total Parenteral Nutrition/Amino Acids/Dextrose/ Fat Emulsion Intravenous 1,800 

ml @  75 mls/hr TPN  CONT IV  Last administered on 5/21/20at 22:01;  Start 

5/21/20 at 22:00;  Stop 5/22/20 at 21:59;  Status DC


Saliva Substitute (Biotene Moisturizing Mouth) 2 spray PRN Q15MIN  PRN PO DRY 

MOUTH;  Start 5/21/20 at 11:00


Potassium Chloride 110 meq/ Magnesium Sulfate 20 meq/ Multivitamins 10 

ml/Chromium/ Copper/Manganese/ Seleni/Zn 1 ml/ Insulin Human Regular 15 unit/ 

Total Parenteral Nutrition/Amino Acids/Dextrose/ Fat Emulsion Intravenous 1,800 

ml @  75 mls/hr TPN  CONT IV  Last administered on 5/22/20at 22:21;  Start 

5/22/20 at 22:00;  Stop 5/23/20 at 21:59;  Status DC


Potassium Chloride 110 meq/ Magnesium Sulfate 20 meq/ Multivitamins 10 

ml/Chromium/ Copper/Manganese/ Seleni/Zn 1 ml/ Insulin Human Regular 15 unit/ 

Total Parenteral Nutrition/Amino Acids/Dextrose/ Fat Emulsion Intravenous 1,800 

ml @  75 mls/hr TPN  CONT IV  Last administered on 5/23/20at 22:04;  Start 

5/23/20 at 22:00;  Stop 5/24/20 at 21:59;  Status DC


Potassium Chloride 110 meq/ Magnesium Sulfate 20 meq/ Multivitamins 10 

ml/Chromium/ Copper/Manganese/ Seleni/Zn 1 ml/ Insulin Human Regular 15 unit/ 

Total Parenteral Nutrition/Amino Acids/Dextrose/ Fat Emulsion Intravenous 1,800 

ml @  75 mls/hr TPN  CONT IV  Last administered on 5/24/20at 22:48;  Start 

5/24/20 at 22:00;  Stop 5/25/20 at 21:59;  Status DC


Potassium Chloride 70 meq/ Magnesium Sulfate 20 meq/ Multivitamins 10 m

l/Chromium/ Copper/Manganese/ Seleni/Zn 1 ml/ Insulin Human Regular 15 unit/ 

Total Parenteral Nutrition/Amino Acids/Dextrose/ Fat Emulsion Intravenous 1,800 

ml @  75 mls/hr TPN  CONT IV  Last administered on 5/25/20at 21:39;  Start 

5/25/20 at 22:00;  Stop 5/26/20 at 21:59;  Status DC


Meropenem 500 mg/ Sodium Chloride 50 ml @  100 mls/hr Q6HRS IV  Last 

administered on 5/27/20at 06:02;  Start 5/25/20 at 18:00;  Stop 5/27/20 at 

09:59;  Status DC


Barium Sulfate (Varibar Thin Liquid Apple) 148 gm 1X  ONCE PO ;  Start 5/26/20 

at 11:45;  Stop 5/26/20 at 11:49;  Status DC


Potassium Chloride 70 meq/ Magnesium Sulfate 20 meq/ Multivitamins 10 

ml/Chromium/ Copper/Manganese/ Seleni/Zn 1 ml/ Insulin Human Regular 15 unit/ 

Total Parenteral Nutrition/Amino Acids/Dextrose/ Fat Emulsion Intravenous 1,800 

ml @  75 mls/hr TPN  CONT IV  Last administered on 5/26/20at 22:27;  Start 

5/26/20 at 22:00;  Stop 5/27/20 at 21:59;  Status DC


Piperacillin Sod/ Tazobactam Sod 3.375 gm/Sodium Chloride 50 ml @  100 mls/hr 

Q6HRS IV  Last administered on 6/4/20at 06:10;  Start 5/27/20 at 12:00;  Stop 

6/4/20 at 07:26;  Status DC


Potassium Chloride 70 meq/ Magnesium Sulfate 20 meq/ Multivitamins 10 

ml/Chromium/ Copper/Manganese/ Seleni/Zn 1 ml/ Insulin Human Regular 15 unit/ 

Total Parenteral Nutrition/Amino Acids/Dextrose/ Fat Emulsion Intravenous 1,800 

ml @  75 mls/hr TPN  CONT IV  Last administered on 5/27/20at 22:03;  Start 

5/27/20 at 22:00;  Stop 5/28/20 at 21:59;  Status DC


Potassium Chloride 70 meq/ Magnesium Sulfate 20 meq/ Multivitamins 10 

ml/Chromium/ Copper/Manganese/ Seleni/Zn 1 ml/ Insulin Human Regular 15 unit/ 

Total Parenteral Nutrition/Amino Acids/Dextrose/ Fat Emulsion Intravenous 1,800 

ml @  75 mls/hr TPN  CONT IV  Last administered on 5/28/20at 22:33;  Start 

5/28/20 at 22:00;  Stop 5/29/20 at 21:59;  Status DC


Potassium Chloride 70 meq/ Magnesium Sulfate 20 meq/ Multivitamins 10 

ml/Chromium/ Copper/Manganese/ Seleni/Zn 1 ml/ Insulin Human Regular 15 unit/ 

Total Parenteral Nutrition/Amino Acids/Dextrose/ Fat Emulsion Intravenous 1,800 

ml @  75 mls/hr TPN  CONT IV  Last administered on 5/29/20at 23:13;  Start 

5/29/20 at 22:00;  Stop 5/30/20 at 21:59;  Status DC


Potassium Chloride 80 meq/ Magnesium Sulfate 20 meq/ Multivitamins 10 

ml/Chromium/ Copper/Manganese/ Seleni/Zn 1 ml/ Insulin Human Regular 15 unit/ 

Total Parenteral Nutrition/Amino Acids/Dextrose/ Fat Emulsion Intravenous 1,800 

ml @  75 mls/hr TPN  CONT IV  Last administered on 5/30/20at 22:30;  Start 

5/30/20 at 22:00;  Stop 5/31/20 at 21:59;  Status DC


Potassium Chloride 80 meq/ Magnesium Sulfate 20 meq/ Multivitamins 10 

ml/Chromium/ Copper/Manganese/ Seleni/Zn 1 ml/ Insulin Human Regular 15 unit/ 

Total Parenteral Nutrition/Amino Acids/Dextrose/ Fat Emulsion Intravenous 1,800 

ml @  75 mls/hr TPN  CONT IV  Last administered on 5/31/20at 21:54;  Start 

5/31/20 at 22:00;  Stop 6/1/20 at 21:59;  Status DC


Potassium Chloride/Water 100 ml @  100 mls/hr 1X  ONCE IV  Last administered on 

6/1/20at 10:15;  Start 6/1/20 at 10:00;  Stop 6/1/20 at 10:59;  Status DC


Potassium Chloride 90 meq/ Magnesium Sulfate 20 meq/ Multivitamins 10 

ml/Chromium/ Copper/Manganese/ Seleni/Zn 1 ml/ Insulin Human Regular 20 unit/ 

Total Parenteral Nutrition/Amino Acids/Dextrose/ Fat Emulsion Intravenous 1,800 

ml @  75 mls/hr TPN  CONT IV  Last administered on 6/1/20at 22:28;  Start 6/1/20

at 22:00;  Stop 6/2/20 at 21:59;  Status DC


Potassium Chloride 90 meq/ Magnesium Sulfate 20 meq/ Multivitamins 10 

ml/Chromium/ Copper/Manganese/ Seleni/Zn 1 ml/ Insulin Human Regular 20 unit/ 

Total Parenteral Nutrition/Amino Acids/Dextrose/ Fat Emulsion Intravenous 1,800 

ml @  75 mls/hr TPN  CONT IV  Last administered on 6/2/20at 22:08;  Start 6/2/20

at 22:00;  Stop 6/3/20 at 21:59;  Status DC


Lorazepam (Ativan Inj) 0.25 mg PRN Q4HRS  PRN IVP ANXIETY / AGITATION Last 

administered on 8/5/20at 23:50;  Start 6/3/20 at 07:30


Potassium Chloride 90 meq/ Magnesium Sulfate 20 meq/ Multivitamins 10 

ml/Chromium/ Copper/Manganese/ Seleni/Zn 1 ml/ Insulin Human Regular 20 unit/ 

Total Parenteral Nutrition/Amino Acids/Dextrose/ Fat Emulsion Intravenous 1,800 

ml @  75 mls/hr TPN  CONT IV  Last administered on 6/3/20at 23:13;  Start 6/3/20

at 22:00;  Stop 6/4/20 at 21:59;  Status DC


Furosemide (Lasix) 40 mg DAILY IVP  Last administered on 6/5/20at 11:14;  Start 

6/3/20 at 13:30;  Stop 6/7/20 at 09:12;  Status DC


Fluoxetine HCl (PROzac) 20 mg QHS PEG  Last administered on 8/5/20at 21:40;  

Start 6/4/20 at 21:00


Fentanyl (Duragesic 50mcg/ Hr Patch) 1 patch Q72H TD  Last administered on 

6/4/20at 21:22;  Start 6/4/20 at 21:00;  Stop 6/13/20 at 12:00;  Status DC


Potassium Chloride 40 meq/ Potassium Acetate 60 meq/Magnesium Sulfate 10 meq/ 

Multivitamins 10 ml/Chromium/ Copper/Manganese/ Seleni/Zn 1 ml/ Insulin Human 

Regular 20 unit/ Total Parenteral Nutrition/Amino Acids/Dextrose/ Fat Emulsion 

Intravenous 1,800 ml @  75 mls/hr TPN  CONT IV  Last administered on 6/5/20at 

00:03;  Start 6/4/20 at 22:00;  Stop 6/5/20 at 21:59;  Status DC


Potassium Acetate 80 meq/Magnesium Sulfate 5 meq/ Multivitamins 10 ml/Chromium/ 

Copper/Manganese/ Seleni/Zn 1 ml/ Insulin Human Regular 20 unit/ Total 

Parenteral Nutrition/Amino Acids/Dextrose/ Fat Emulsion Intravenous 1,920 ml @  

80 mls/hr TPN  CONT IV  Last administered on 6/5/20at 21:59;  Start 6/5/20 at 

22:00;  Stop 6/6/20 at 21:59;  Status DC


Potassium Acetate 60 meq/Magnesium Sulfate 5 meq/ Multivitamins 10 ml/Chromium/ 

Copper/Manganese/ Seleni/Zn 1 ml/ Insulin Human Regular 30 unit/ Total 

Parenteral Nutrition/Amino Acids/Dextrose/ Fat Emulsion Intravenous 1,920 ml @  

80 mls/hr TPN  CONT IV  Last administered on 6/6/20at 21:54;  Start 6/6/20 at 

22:00;  Stop 6/7/20 at 21:59;  Status DC


Norepinephrine Bitartrate 8 mg/ Dextrose 258 ml @  13.332 mls/ hr CONT  PRN IV 

PER PROTOCOL Last administered on 7/2/20at 09:09;  Start 6/7/20 at 06:30;  Stop 

8/3/20 at 09:45;  Status DC


Albumin Human 500 ml @  125 mls/hr 1X  ONCE IV  Last administered on 6/7/20at 

08:10;  Start 6/7/20 at 08:15;  Stop 6/7/20 at 12:14;  Status DC


Potassium Acetate 40 meq/Magnesium Sulfate 5 meq/ Multivitamins 10 ml/Chromium/ 

Copper/Manganese/ Seleni/Zn 1 ml/ Insulin Human Regular 30 unit/ Total 

Parenteral Nutrition/Amino Acids/Dextrose/ Fat Emulsion Intravenous 1,920 ml @  

80 mls/hr TPN  CONT IV  Last administered on 6/7/20at 22:23;  Start 6/7/20 at 

22:00;  Stop 6/8/20 at 21:59;  Status DC


Meropenem 1 gm/ Sodium Chloride 100 ml @  200 mls/hr Q8HRS IV ;  Start 6/7/20 at

14:00;  Status Cancel


Meropenem 1 gm/ Sodium Chloride 100 ml @  200 mls/hr Q8HRS IV  Last administered

on 6/7/20at 11:04;  Start 6/7/20 at 10:00;  Stop 6/7/20 at 13:00;  Status DC


Meropenem 1 gm/ Sodium Chloride 100 ml @  200 mls/hr Q12HR IV  Last administered

on 6/25/20at 08:27;  Start 6/7/20 at 21:00;  Stop 6/25/20 at 08:56;  Status DC


Sodium Chloride 1,000 ml @  1,000 mls/hr 1X  ONCE IV  Last administered on 

6/7/20at 11:06;  Start 6/7/20 at 10:45;  Stop 6/7/20 at 11:44;  Status DC


Micafungin Sodium 100 mg/Dextrose 100 ml @  100 mls/hr Q24H IV  Last 

administered on 6/24/20at 12:34;  Start 6/7/20 at 11:00;  Stop 6/25/20 at 08:56;

 Status DC


Daptomycin 410 mg/ Sodium Chloride 50 ml @  100 mls/hr Q24H IV  Last 

administered on 6/9/20at 13:33;  Start 6/7/20 at 14:00;  Stop 6/10/20 at 08:30; 

Status DC


Midazolam HCl (Versed) 2 mg STK-MED ONCE .ROUTE ;  Start 6/7/20 at 14:47;  Stop 

6/7/20 at 14:48;  Status DC


Fentanyl Citrate (Fentanyl 2ml Vial) 100 mcg STK-MED ONCE .ROUTE ;  Start 6/7/20

at 14:47;  Stop 6/7/20 at 14:48;  Status DC


Flumazenil (Romazicon) 0.5 mg STK-MED ONCE IV ;  Start 6/7/20 at 14:48;  Stop 

6/7/20 at 14:48;  Status DC


Naloxone HCl (Narcan) 0.4 mg STK-MED ONCE .ROUTE ;  Start 6/7/20 at 14:48;  Stop

6/7/20 at 14:48;  Status DC


Lidocaine HCl (Lidocaine 1% 20ml Vial) 20 ml STK-MED ONCE .ROUTE ;  Start 6/7/20

at 14:48;  Stop 6/7/20 at 14:48;  Status DC


Midazolam HCl (Versed) 2 mg 1X  ONCE IV  Last administered on 6/7/20at 15:28;  

Start 6/7/20 at 15:00;  Stop 6/7/20 at 15:01;  Status DC


Fentanyl Citrate (Fentanyl 2ml Vial) 100 mcg 1X  ONCE IV  Last administered on 

6/7/20at 15:28;  Start 6/7/20 at 15:00;  Stop 6/7/20 at 15:01;  Status DC


Lidocaine HCl (Lidocaine 1% 20ml Vial) 20 ml 1X  ONCE INJ  Last administered on 

6/7/20at 15:30;  Start 6/7/20 at 15:00;  Stop 6/7/20 at 15:01;  Status DC


Sodium Chloride 1,000 ml @  100 mls/hr Q10H IV  Last administered on 6/16/20at 

07:30;  Start 6/7/20 at 20:00;  Stop 6/16/20 at 11:26;  Status DC


Sodium Bicarbonate (Sodium Bicarb Adult 8.4% Syr) 50 meq 1X  ONCE IV  Last 

administered on 6/7/20at 21:47;  Start 6/7/20 at 22:00;  Stop 6/7/20 at 22:01;  

Status DC


Potassium Acetate 40 meq/Magnesium Sulfate 5 meq/ Multivitamins 10 ml/Chromium/ 

Copper/Manganese/ Seleni/Zn 1 ml/ Insulin Human Regular 30 unit/ Total 

Parenteral Nutrition/Amino Acids/Dextrose/ Fat Emulsion Intravenous 1,920 ml @  

80 mls/hr TPN  CONT IV  Last administered on 6/8/20at 22:28;  Start 6/8/20 at 

22:00;  Stop 6/9/20 at 21:59;  Status DC


Sodium Chloride 500 ml @  500 mls/hr 1X  ONCE IV  Last administered on 6/9/20at 

06:39;  Start 6/9/20 at 06:45;  Stop 6/9/20 at 07:44;  Status DC


Potassium Acetate 40 meq/Magnesium Sulfate 5 meq/ Multivitamins 10 ml/Chromium/ 

Copper/Manganese/ Seleni/Zn 1 ml/ Insulin Human Regular 30 unit/ Total 

Parenteral Nutrition/Amino Acids/Dextrose/ Fat Emulsion Intravenous 1,920 ml @  

80 mls/hr TPN  CONT IV  Last administered on 6/9/20at 22:03;  Start 6/9/20 at 

22:00;  Stop 6/10/20 at 21:59;  Status DC


Metoprolol Tartrate (Lopressor Vial) 5 mg PRN Q6HRS  PRN IVP HYPERTENSION Last 

administered on 8/4/20at 17:40;  Start 6/10/20 at 09:00


Potassium Acetate 40 meq/Magnesium Sulfate 5 meq/ Multivitamins 10 ml/Chromium/ 

Copper/Manganese/ Seleni/Zn 1 ml/ Insulin Human Regular 30 unit/ Total Parenter

al Nutrition/Amino Acids/Dextrose/ Fat Emulsion Intravenous 1,920 ml @  80 

mls/hr TPN  CONT IV  Last administered on 6/10/20at 21:26;  Start 6/10/20 at 

22:00;  Stop 6/11/20 at 21:59;  Status DC


Potassium Acetate 40 meq/Magnesium Sulfate 5 meq/ Multivitamins 10 ml/Chromium/ 

Copper/Manganese/ Seleni/Zn 1 ml/ Insulin Human Regular 30 unit/ Total 

Parenteral Nutrition/Amino Acids/Dextrose/ Fat Emulsion Intravenous 1,920 ml @  

80 mls/hr TPN  CONT IV  Last administered on 6/11/20at 23:23;  Start 6/11/20 at 

22:00;  Stop 6/12/20 at 21:59;  Status DC


Potassium Acetate 40 meq/Magnesium Sulfate 5 meq/ Multivitamins 10 ml/Chromium/ 

Copper/Manganese/ Seleni/Zn 1 ml/ Insulin Human Regular 30 unit/ Total 

Parenteral Nutrition/Amino Acids/Dextrose/ Fat Emulsion Intravenous 1,920 ml @  

80 mls/hr TPN  CONT IV  Last administered on 6/12/20at 21:35;  Start 6/12/20 at 

22:00;  Stop 6/13/20 at 21:59;  Status DC


Furosemide (Lasix) 20 mg 1X  ONCE IVP  Last administered on 6/13/20at 06:26;  

Start 6/13/20 at 06:15;  Stop 6/13/20 at 06:16;  Status DC


Methylprednisolone Sodium Succinate (SOLU-Medrol 125MG VIAL) 125 mg 1X  ONCE IV 

Last administered on 6/13/20at 06:26;  Start 6/13/20 at 06:15;  Stop 6/13/20 at 

06:16;  Status DC


Albuterol/ Ipratropium (Duoneb) 3 ml Q4HRS NEB  Last administered on 8/6/20at 

08:47;  Start 6/13/20 at 08:00


Fentanyl Citrate 30 ml @ 0 mls/hr CONT  PRN IV SEE PROTOCOL Last administered on

7/4/20at 08:03;  Start 6/13/20 at 06:00;  Stop 7/4/20 at 12:42;  Status DC


Propofol 100 ml @ 0 mls/hr CONT  PRN IV SEE PROTOCOL Last administered on 

6/20/20at 23:50;  Start 6/13/20 at 06:00;  Stop 8/3/20 at 09:45;  Status DC


Fentanyl Citrate (Fentanyl 2ml Vial) 25 mcg PRN Q1HR  PRN IV SEE COMMENTS Last 

administered on 8/1/20at 23:56;  Start 6/13/20 at 06:00;  Stop 8/3/20 at 09:45; 

Status DC


Fentanyl Citrate (Fentanyl 2ml Vial) 50 mcg PRN Q1HR  PRN IV SEE COMMENTS Last 

administered on 8/3/20at 09:39;  Start 6/13/20 at 06:00;  Stop 8/3/20 at 09:45; 

Status DC


Chlorhexidine Gluconate (Peridex) 15 ml BID MM ;  Start 6/13/20 at 09:00;  Stop 

6/13/20 at 07:58;  Status DC


Potassium Acetate 40 meq/Magnesium Sulfate 5 meq/ Multivitamins 10 ml/Chromium/ 

Copper/Manganese/ Seleni/Zn 1 ml/ Insulin Human Regular 30 unit/ Total 

Parenteral Nutrition/Amino Acids/Dextrose/ Fat Emulsion Intravenous 1,920 ml @  

80 mls/hr TPN  CONT IV  Last administered on 6/13/20at 21:19;  Start 6/13/20 at 

22:00;  Stop 6/14/20 at 21:59;  Status DC


Acetylcysteine (Mucomyst 20% Resp Treatment) 600 mg BID NEB  Last administered 

on 6/19/20at 09:33;  Start 6/13/20 at 21:00;  Stop 6/19/20 at 10:39;  Status DC


Magnesium Sulfate 100 ml @  25 mls/hr 1X  ONCE IV  Last administered on 

6/13/20at 15:48;  Start 6/13/20 at 15:45;  Stop 6/13/20 at 19:44;  Status DC


Potassium Acetate 40 meq/Magnesium Sulfate 5 meq/ Multivitamins 10 ml/Chromium/ 

Copper/Manganese/ Seleni/Zn 1 ml/ Insulin Human Regular 30 unit/ Total 

Parenteral Nutrition/Amino Acids/Dextrose/ Fat Emulsion Intravenous 1,920 ml @  

80 mls/hr TPN  CONT IV  Last administered on 6/14/20at 21:35;  Start 6/14/20 at 

22:00;  Stop 6/15/20 at 21:59;  Status DC


Potassium Chloride/Water 100 ml @  100 mls/hr Q1H IV  Last administered on 

6/15/20at 08:31;  Start 6/15/20 at 07:00;  Stop 6/15/20 at 08:59;  Status DC


Potassium Acetate 40 meq/Magnesium Sulfate 5 meq/ Multivitamins 10 ml/Chromium/ 

Copper/Manganese/ Seleni/Zn 1 ml/ Insulin Human Regular 30 unit/ Total 

Parenteral Nutrition/Amino Acids/Dextrose/ Fat Emulsion Intravenous 1,920 ml @  

80 mls/hr TPN  CONT IV  Last administered on 6/15/20at 21:54;  Start 6/15/20 at 

22:00;  Stop 6/16/20 at 19:34;  Status DC


Lidocaine HCl (Buffered Lidocaine 1%) 3 ml STK-MED ONCE .ROUTE ;  Start 6/15/20 

at 12:14;  Stop 6/15/20 at 12:14;  Status DC


Lidocaine HCl (Buffered Lidocaine 1%) 3 ml 1X  ONCE IJ  Last administered on 

6/15/20at 13:11;  Start 6/15/20 at 13:00;  Stop 6/15/20 at 13:01;  Status DC


Magnesium Sulfate 50 ml @ 25 mls/hr 1X  ONCE IV ;  Start 6/16/20 at 08:15;  Stop

6/16/20 at 10:14;  Status DC


Potassium Acetate 40 meq/Magnesium Sulfate 10 meq/ Multivitamins 10 ml/Chromium/

Copper/Manganese/ Seleni/Zn 1 ml/ Insulin Human Regular 20 unit/ Total 

Parenteral Nutrition/Amino Acids/Dextrose/ Fat Emulsion Intravenous 1,920 ml @  

80 mls/hr TPN  CONT IV  Last administered on 6/16/20at 21:32;  Start 6/16/20 at 

22:00;  Stop 6/17/20 at 21:59;  Status DC


Potassium Chloride/Water 100 ml @  100 mls/hr Q1H IV  Last administered on 

6/17/20at 09:12;  Start 6/17/20 at 08:00;  Stop 6/17/20 at 09:59;  Status DC


Alteplase, Recombinant (Cathflo For Central Catheter Clearance) 4 mg 1X  ONCE 

INT CAT ;  Start 6/17/20 at 09:15;  Stop 6/17/20 at 09:16;  Status UNV


Alteplase, Recombinant (Cathflo For Central Catheter Clearance) 4 mg 1X  ONCE 

INT CAT ;  Start 6/17/20 at 09:15;  Stop 6/17/20 at 09:16;  Status UNV


Alteplase, Recombinant (Cathflo For Central Catheter Clearance) 4 mg 1X  ONCE 

INT CAT ;  Start 6/17/20 at 09:15;  Stop 6/17/20 at 09:16;  Status UNV


Alteplase, Recombinant 4 mg/ Sodium Chloride 20 ml @ 20 mls/hr 1X  ONCE IV  Last

administered on 6/17/20at 10:10;  Start 6/17/20 at 10:00;  Stop 6/17/20 at 

10:59;  Status DC


Alteplase, Recombinant 4 mg/ Sodium Chloride 20 ml @ 20 mls/hr 1X  ONCE IV  Last

administered on 6/17/20at 10:09;  Start 6/17/20 at 10:00;  Stop 6/17/20 at 

10:59;  Status DC


Alteplase, Recombinant 4 mg/ Sodium Chloride 20 ml @ 20 mls/hr 1X  ONCE IV  Last

administered on 6/17/20at 10:09;  Start 6/17/20 at 10:00;  Stop 6/17/20 at 

10:59;  Status DC


Potassium Acetate 60 meq/Magnesium Sulfate 10 meq/ Multivitamins 10 ml/Chromium/

Copper/Manganese/ Seleni/Zn 1 ml/ Insulin Human Regular 20 unit/ Total 

Parenteral Nutrition/Amino Acids/Dextrose/ Fat Emulsion Intravenous 1,920 ml @  

80 mls/hr TPN  CONT IV  Last administered on 6/17/20at 21:55;  Start 6/17/20 at 

22:00;  Stop 6/18/20 at 21:59;  Status DC


Albumin Human 500 ml @  125 mls/hr 1X  ONCE IV  Last administered on 6/18/20at 

12:01;  Start 6/18/20 at 11:15;  Stop 6/18/20 at 15:14;  Status DC


Sodium Chloride 500 ml @  500 mls/hr 1X  ONCE IV  Last administered on 6/18/20at

13:50;  Start 6/18/20 at 11:15;  Stop 6/18/20 at 12:14;  Status DC


Potassium Acetate 60 meq/Magnesium Sulfate 14 meq/ Multivitamins 10 ml/Chromium/

Copper/Manganese/ Seleni/Zn 1 ml/ Insulin Human Regular 20 unit/ Total Parentera

l Nutrition/Amino Acids/Dextrose/ Fat Emulsion Intravenous 1,920 ml @  80 mls/hr

TPN  CONT IV  Last administered on 6/18/20at 22:26;  Start 6/18/20 at 22:00;  

Stop 6/19/20 at 21:59;  Status DC


Ciprofloxacin/ Dextrose 200 ml @  200 mls/hr Q12HR IV  Last administered on 

6/25/20at 08:27;  Start 6/18/20 at 21:00;  Stop 6/25/20 at 08:56;  Status DC


Albumin Human 250 ml @  62.5 mls/hr 1X  ONCE IV  Last administered on 6/19/20at 

11:09;  Start 6/19/20 at 11:00;  Stop 6/19/20 at 14:59;  Status DC


Furosemide (Lasix) 20 mg 1X  ONCE IVP  Last administered on 6/19/20at 14:52;  

Start 6/19/20 at 10:45;  Stop 6/19/20 at 10:49;  Status DC


Potassium Acetate 60 meq/Magnesium Sulfate 14 meq/ Multivitamins 10 ml/Chromium/

Copper/Manganese/ Seleni/Zn 1 ml/ Insulin Human Regular 15 unit/ Total 

Parenteral Nutrition/Amino Acids/Dextrose/ Fat Emulsion Intravenous 1,920 ml @  

80 mls/hr TPN  CONT IV  Last administered on 6/19/20at 22:08;  Start 6/19/20 at 

22:00;  Stop 6/20/20 at 21:59;  Status DC


Potassium Acetate 60 meq/Magnesium Sulfate 14 meq/ Multivitamins 10 ml/Chromium/

Copper/Manganese/ Seleni/Zn 1 ml/ Insulin Human Regular 15 unit/ Total 

Parenteral Nutrition/Amino Acids/Dextrose/ Fat Emulsion Intravenous 1,920 ml @  

80 mls/hr TPN  CONT IV  Last administered on 6/20/20at 22:12;  Start 6/20/20 at 

22:00;  Stop 6/21/20 at 21:59;  Status DC


Potassium Acetate 60 meq/Magnesium Sulfate 14 meq/ Multivitamins 10 ml/Chromium/

Copper/Manganese/ Seleni/Zn 1 ml/ Insulin Human Regular 15 unit/ Total 

Parenteral Nutrition/Amino Acids/Dextrose/ Fat Emulsion Intravenous 1,920 ml @  

80 mls/hr TPN  CONT IV  Last administered on 6/21/20at 22:22;  Start 6/21/20 at 

22:00;  Stop 6/22/20 at 21:59;  Status DC


Furosemide (Lasix) 20 mg 1X  ONCE IVP  Last administered on 6/22/20at 11:07;  

Start 6/22/20 at 10:30;  Stop 6/22/20 at 10:34;  Status DC


Potassium Acetate 60 meq/Magnesium Sulfate 14 meq/ Multivitamins 10 ml/Chromium/

Copper/Manganese/ Seleni/Zn 1 ml/ Insulin Human Regular 15 unit/ Sodium Chloride

20 meq/Total Parenteral Nutrition/Amino Acids/Dextrose/ Fat Emulsion Intravenous

1,920 ml @  80 mls/hr TPN  CONT IV  Last administered on 6/22/20at 21:54;  Start

6/22/20 at 22:00;  Stop 6/23/20 at 21:59;  Status DC


Potassium Acetate 30 meq/Magnesium Sulfate 14 meq/ Multivitamins 10 ml/Chromium/

Copper/Manganese/ Seleni/Zn 1 ml/ Insulin Human Regular 15 unit/ Sodium Chloride

20 meq/Potassium Chloride 30 meq/ Total Parenteral Nutrition/Amino 

Acids/Dextrose/ Fat Emulsion Intravenous 1,920 ml @  80 mls/hr TPN  CONT IV  

Last administered on 6/23/20at 21:46;  Start 6/23/20 at 22:00;  Stop 6/24/20 at 

21:59;  Status DC


Sodium Chloride 80 meq/Potassium Chloride 30 meq/ Potassium Acetate 30 meq/Mag

nesium Sulfate 14 meq/ Multivitamins 10 ml/Chromium/ Copper/Manganese/ Seleni/Zn

1 ml/ Insulin Human Regular 15 unit/ Total Parenteral Nutrition/Amino 

Acids/Dextrose/ Fat Emulsion Intravenous 1,920 ml @  80 mls/hr TPN  CONT IV  

Last administered on 6/24/20at 22:33;  Start 6/24/20 at 22:00;  Stop 6/25/20 at 

21:59;  Status DC


Furosemide (Lasix) 40 mg 1X  ONCE IVP  Last administered on 6/24/20at 16:27;  

Start 6/24/20 at 15:30;  Stop 6/24/20 at 15:33;  Status DC


Albumin Human 250 ml @  62.5 mls/hr 1X  ONCE IV  Last administered on 6/24/20at 

16:27;  Start 6/24/20 at 15:30;  Stop 6/24/20 at 19:29;  Status DC


Sodium Chloride 80 meq/Potassium Chloride 30 meq/ Potassium Acetate 30 

meq/Magnesium Sulfate 14 meq/ Multivitamins 10 ml/Chromium/ Copper/Manganese/ 

Seleni/Zn 1 ml/ Insulin Human Regular 15 unit/ Total Parenteral Nutrition/Amino 

Acids/Dextrose/ Fat Emulsion Intravenous 1,920 ml @  80 mls/hr TPN  CONT IV  

Last administered on 6/25/20at 22:25;  Start 6/25/20 at 22:00;  Stop 6/26/20 at 

21:59;  Status DC


Sodium Chloride 80 meq/Potassium Chloride 30 meq/ Potassium Acetate 30 

meq/Magnesium Sulfate 14 meq/ Multivitamins 10 ml/Chromium/ Copper/Manganese/ 

Seleni/Zn 1 ml/ Insulin Human Regular 15 unit/ Total Parenteral Nutrition/Amino 

Acids/Dextrose/ Fat Emulsion Intravenous 1,920 ml @  80 mls/hr TPN  CONT IV  

Last administered on 6/26/20at 21:32;  Start 6/26/20 at 22:00;  Stop 6/27/20 at 

21:59;  Status DC


Sodium Chloride 80 meq/Potassium Chloride 30 meq/ Potassium Acetate 30 

meq/Magnesium Sulfate 14 meq/ Multivitamins 10 ml/Chromium/ Copper/Manganese/ 

Seleni/Zn 1 ml/ Insulin Human Regular 15 unit/ Total Parenteral Nutrition/Amino 

Acids/Dextrose/ Fat Emulsion Intravenous 1,920 ml @  80 mls/hr TPN  CONT IV  

Last administered on 6/27/20at 21:53;  Start 6/27/20 at 22:00;  Stop 6/28/20 at 

21:59;  Status DC


Acetylcysteine (Mucomyst 20% Resp Treatment) 600 mg RTBID NEB  Last administered

on 8/6/20at 08:47;  Start 6/27/20 at 12:00


Sodium Chloride 80 meq/Potassium Chloride 30 meq/ Potassium Acetate 30 

meq/Magnesium Sulfate 14 meq/ Multivitamins 10 ml/Chromium/ Copper/Manganese/ 

Seleni/Zn 1 ml/ Insulin Human Regular 15 unit/ Total Parenteral Nutrition/Amino 

Acids/Dextrose/ Fat Emulsion Intravenous 1,920 ml @  80 mls/hr TPN  CONT IV  

Last administered on 6/28/20at 22:06;  Start 6/28/20 at 22:00;  Stop 6/29/20 at 

21:59;  Status DC


Meropenem 500 mg/ Sodium Chloride 50 ml @  100 mls/hr Q6HRS IV  Last 

administered on 7/21/20at 06:15;  Start 6/28/20 at 18:00;  Stop 7/21/20 at 

08:23;  Status DC


Daptomycin 500 mg/ Sodium Chloride 50 ml @  100 mls/hr Q24H IV  Last 

administered on 7/6/20at 21:47;  Start 6/28/20 at 19:00;  Stop 7/7/20 at 08:13; 

Status DC


Sodium Chloride 80 meq/Potassium Chloride 30 meq/ Potassium Acetate 30 

meq/Magnesium Sulfate 14 meq/ Multivitamins 10 ml/Chromium/ Copper/Manganese/ 

Seleni/Zn 1 ml/ Insulin Human Regular 15 unit/ Total Parenteral Nutrition/Amino 

Acids/Dextrose/ Fat Emulsion Intravenous 1,920 ml @  80 mls/hr TPN  CONT IV  

Last administered on 6/29/20at 22:09;  Start 6/29/20 at 22:00;  Stop 6/30/20 at 

21:59;  Status DC


Heparin Sodium (Porcine) 1000 unit/Sodium Chloride 1,001 ml @  1,001 mls/hr 1X  

ONCE IRR ;  Start 6/30/20 at 06:00;  Stop 6/30/20 at 06:59;  Status DC


Propofol (Diprivan) 200 mg STK-MED ONCE IV ;  Start 6/30/20 at 07:44;  Stop 

6/30/20 at 07:44;  Status DC


Lidocaine HCl (Lidocaine Pf 2% Vial) 5 ml STK-MED ONCE .ROUTE ;  Start 6/30/20 

at 07:44;  Stop 6/30/20 at 07:44;  Status DC


Fentanyl Citrate (Fentanyl 2ml Vial) 100 mcg STK-MED ONCE .ROUTE ;  Start 

6/30/20 at 07:44;  Stop 6/30/20 at 07:44;  Status DC


Rocuronium Bromide (Zemuron) 100 mg STK-MED ONCE .ROUTE ;  Start 6/30/20 at 

07:44;  Stop 6/30/20 at 07:44;  Status DC


Micafungin Sodium 100 mg/Dextrose 100 ml @  100 mls/hr Q24H IV  Last 

administered on 8/4/20at 09:30;  Start 6/30/20 at 08:30;  Stop 8/5/20 at 08:20; 

Status DC


Bupivacaine HCl/ Epinephrine Bitart (Sensorcain-Epi 0.5%-1:318882 Mpf) 30 ml 

STK-MED ONCE .ROUTE ;  Start 6/30/20 at 08:34;  Stop 6/30/20 at 08:35;  Status 

DC


Iohexol (Omnipaque 300 Mg/ml) 50 ml STK-MED ONCE .ROUTE  Last administered on 

6/30/20at 13:30;  Start 6/30/20 at 08:35;  Stop 6/30/20 at 08:35;  Status DC


Sodium Chloride 80 meq/Potassium Chloride 30 meq/ Potassium Acetate 30 

meq/Magnesium Sulfate 14 meq/ Multivitamins 10 ml/Chromium/ Copper/Manganese/ 

Seleni/Zn 1 ml/ Insulin Human Regular 15 unit/ Total Parenteral Nutrition/Amino 

Acids/Dextrose/ Fat Emulsion Intravenous 1,920 ml @  80 mls/hr TPN  CONT IV  

Last administered on 7/1/20at 01:22;  Start 6/30/20 at 22:00;  Stop 7/1/20 at 

21:59;  Status DC


Phenylephrine HCl (Ken-Synephrine Inj) 10 mg STK-MED ONCE .ROUTE ;  Start 

6/30/20 at 10:15;  Stop 6/30/20 at 10:15;  Status DC


Desflurane (Suprane) 90 ml STK-MED ONCE IH ;  Start 6/30/20 at 10:18;  Stop 

6/30/20 at 10:19;  Status DC


Albumin Human 500 ml @  As Directed STK-MED ONCE IV ;  Start 6/30/20 at 11:06;  

Stop 6/30/20 at 11:06;  Status DC


Vasopressin (Vasostrict) 20 unit STK-MED ONCE .ROUTE ;  Start 6/30/20 at 12:23; 

Stop 6/30/20 at 12:23;  Status DC


Phenylephrine HCl (Ken-Synephrine Inj) 10 mg STK-MED ONCE .ROUTE ;  Start 

6/30/20 at 13:33;  Stop 6/30/20 at 13:33;  Status DC


Phenylephrine HCl (Ken-Synephrine Inj) 10 mg STK-MED ONCE .ROUTE ;  Start 

6/30/20 at 13:33;  Stop 6/30/20 at 13:33;  Status DC


Ondansetron HCl (Zofran) 4 mg STK-MED ONCE .ROUTE ;  Start 6/30/20 at 13:33;  

Stop 6/30/20 at 13:33;  Status DC


Enoxaparin Sodium (Lovenox 40mg Syringe) 40 mg Q24H SQ  Last administered on 

8/6/20at 08:34;  Start 7/1/20 at 08:00


Sodium Chloride (Normal Saline Flush) 3 ml QSHIFT  PRN IV AFTER MEDS AND BLOOD 

DRAWS;  Start 6/30/20 at 14:45;  Stop 8/3/20 at 09:45;  Status DC


Naloxone HCl (Narcan) 0.4 mg PRN Q2MIN  PRN IV SEE INSTRUCTIONS;  Start 6/30/20 

at 14:45;  Stop 8/3/20 at 09:45;  Status DC


Sodium Chloride 1,000 ml @  25 mls/hr Q24H IV  Last administered on 8/2/20at 

20:55;  Start 6/30/20 at 14:33;  Stop 8/3/20 at 09:45;  Status DC


Morphine Sulfate (Morphine Sulfate) 1 mg PRN Q1HR  PRN IV PAIN Last administered

on 7/25/20at 18:28;  Start 6/30/20 at 14:45;  Stop 8/3/20 at 09:45;  Status DC


Midazolam HCl 100 mg/Sodium Chloride 100 ml @ 1 mls/hr CONT  PRN IV SEE I/O 

RECORD Last administered on 7/3/20at 18:48;  Start 6/30/20 at 14:45;  Stop 

8/3/20 at 09:45;  Status DC


Phenylephrine HCl (PHENYLEPHRINE in 0.9% NACL PF) 1 mg STK-MED ONCE IV ;  Start 

6/30/20 at 14:44;  Stop 6/30/20 at 14:45;  Status DC


Ephedrine Sulfate (ePHEDrine PF IN SALINE SYRINGE) 50 mg STK-MED ONCE IV ;  

Start 6/30/20 at 14:45;  Stop 6/30/20 at 14:45;  Status DC


Vasopressin 20 unit/Dextrose 101 ml @  12 mls/hr CONT  PRN IV SEE I/O RECORD 

Last administered on 7/7/20at 04:17;  Start 6/30/20 at 15:30;  Stop 8/3/20 at 

09:45;  Status DC


Sodium Chloride 1,000 ml @  1,000 mls/hr 1X  ONCE IV  Last administered on 

6/30/20at 15:42;  Start 6/30/20 at 15:45;  Stop 6/30/20 at 16:44;  Status DC


Albumin Human 500 ml @  125 mls/hr 1X  ONCE IV ;  Start 6/30/20 at 16:00;  Stop 

6/30/20 at 19:59;  Status DC


Albumin Human 500 ml @  125 mls/hr PRN Q1HR  PRN IV PER PROTOCOL;  Start 6/30/20

at 15:45;  Stop 8/3/20 at 09:52;  Status DC


Magnesium Sulfate 50 ml @ 25 mls/hr 1X  ONCE IV  Last administered on 6/30/20at 

17:02;  Start 6/30/20 at 16:30;  Stop 6/30/20 at 18:29;  Status DC


Sodium Bicarbonate (Sodium Bicarb Adult 8.4% Syr) 50 meq STK-MED ONCE .ROUTE ;  

Start 6/30/20 at 16:20;  Stop 6/30/20 at 16:20;  Status DC


Sodium Bicarbonate (Sodium Bicarb Adult 8.4% Syr) 100 meq 1X  ONCE IV  Last 

administered on 6/30/20at 17:07;  Start 6/30/20 at 16:30;  Stop 6/30/20 at 

16:31;  Status DC


Sodium Bicarbonate 150 meq/Dextrose 1,150 ml @  75 mls/hr 1X  ONCE IV  Last 

administered on 6/30/20at 20:02;  Start 6/30/20 at 16:30;  Stop 7/1/20 at 07:49;

 Status DC


Sodium Chloride 80 meq/Potassium Chloride 30 meq/ Potassium Acetate 30 

meq/Magnesium Sulfate 14 meq/ Multivitamins 10 ml/Chromium/ Copper/Manganese/ 

Seleni/Zn 1 ml/ Insulin Human Regular 15 unit/ Total Parenteral Nutrition/Amino 

Acids/Dextrose/ Fat Emulsion Intravenous 1,920 ml @  80 mls/hr TPN  CONT IV  

Last administered on 7/1/20at 23:05;  Start 7/1/20 at 22:00;  Stop 7/2/20 at 

21:59;  Status DC


Sodium Chloride 100 meq/Potassium Chloride 30 meq/ Potassium Acetate 30 

meq/Magnesium Sulfate 12 meq/ Multivitamins 10 ml/Chromium/ Copper/Manganese/ 

Seleni/Zn 1 ml/ Insulin Human Regular 15 unit/ Total Parenteral Nutrition/Amino 

Acids/Dextrose/ Fat Emulsion Intravenous 1,920 ml @  80 mls/hr TPN  CONT IV  

Last administered on 7/2/20at 21:52;  Start 7/2/20 at 22:00;  Stop 7/3/20 at 

21:59;  Status DC


Sodium Chloride 100 meq/Potassium Chloride 30 meq/ Potassium Acetate 30 

meq/Magnesium Sulfate 12 meq/ Multivitamins 10 ml/Chromium/ Copper/Manganese/ 

Seleni/Zn 1 ml/ Insulin Human Regular 15 unit/ Total Parenteral Nutrition/Amino 

Acids/Dextrose/ Fat Emulsion Intravenous 1,920 ml @  80 mls/hr TPN  CONT IV  L

ast administered on 7/3/20at 21:46;  Start 7/3/20 at 22:00;  Stop 7/4/20 at 

21:59;  Status DC


Sodium Chloride 100 meq/Potassium Chloride 30 meq/ Potassium Acetate 30 

meq/Magnesium Sulfate 12 meq/ Multivitamins 10 ml/Chromium/ Copper/Manganese/ 

Seleni/Zn 1 ml/ Insulin Human Regular 15 unit/ Total Parenteral Nutrition/Amino 

Acids/Dextrose/ Fat Emulsion Intravenous 1,800 ml @  75 mls/hr TPN  CONT IV  

Last administered on 7/4/20at 22:04;  Start 7/4/20 at 22:00;  Stop 7/5/20 at 

21:59;  Status DC


Fentanyl Citrate 55 ml @ 0 mls/hr CONT  PRN IV SEE COMMENTS Last administered on

7/6/20at 23:55;  Start 7/4/20 at 13:00;  Stop 7/9/20 at 17:28;  Status DC


Sodium Chloride 100 meq/Potassium Chloride 30 meq/ Potassium Acetate 30 

meq/Magnesium Sulfate 12 meq/ Multivitamins 10 ml/Chromium/ Copper/Manganese/ 

Seleni/Zn 1 ml/ Insulin Human Regular 15 unit/ Total Parenteral Nutrition/Amino 

Acids/Dextrose/ Fat Emulsion Intravenous 1,680 ml @  70 mls/hr TPN  CONT IV  

Last administered on 7/5/20at 21:23;  Start 7/5/20 at 22:00;  Stop 7/6/20 at 

21:59;  Status DC


Sodium Chloride 110 meq/Potassium Chloride 30 meq/ Potassium Acetate 30 

meq/Magnesium Sulfate 15 meq/ Multivitamins 10 ml/Chromium/ Copper/Manganese/ 

Seleni/Zn 1 ml/ Insulin Human Regular 15 unit/ Total Parenteral Nutrition/Amino 

Acids/Dextrose/ Fat Emulsion Intravenous 1,680 ml @  70 mls/hr TPN  CONT IV  

Last administered on 7/6/20at 21:48;  Start 7/6/20 at 22:00;  Stop 7/7/20 at 

21:59;  Status DC


Sodium Chloride 110 meq/Potassium Chloride 30 meq/ Potassium Acetate 30 

meq/Magnesium Sulfate 15 meq/ Multivitamins 10 ml/Chromium/ Copper/Manganese/ 

Seleni/Zn 1 ml/ Insulin Human Regular 15 unit/ Total Parenteral Nutrition/Amino 

Acids/Dextrose/ Fat Emulsion Intravenous 1,680 ml @  70 mls/hr TPN  CONT IV  

Last administered on 7/7/20at 21:33;  Start 7/7/20 at 22:00;  Stop 7/8/20 at 

21:59;  Status DC


Sodium Chloride 110 meq/Potassium Chloride 30 meq/ Potassium Acetate 30 

meq/Magnesium Sulfate 15 meq/ Multivitamins 10 ml/Chromium/ Copper/Manganese/ 

Seleni/Zn 1 ml/ Insulin Human Regular 15 unit/ Total Parenteral Nutrition/Amino 

Acids/Dextrose/ Fat Emulsion Intravenous 1,680 ml @  70 mls/hr TPN  CONT IV  

Last administered on 7/8/20at 21:51;  Start 7/8/20 at 22:00;  Stop 7/9/20 at 

21:59;  Status DC


Sodium Chloride 90 meq/Potassium Chloride 30 meq/ Potassium Acetate 30 

meq/Magnesium Sulfate 15 meq/ Multivitamins 10 ml/Chromium/ Copper/Manganese/ 

Seleni/Zn 1 ml/ Insulin Human Regular 15 unit/ Total Parenteral Nutrition/Amino 

Acids/Dextrose/ Fat Emulsion Intravenous 1,680 ml @  70 mls/hr TPN  CONT IV  

Last administered on 7/9/20at 22:38;  Start 7/9/20 at 22:00;  Stop 7/10/20 at 

21:59;  Status DC


Fentanyl Citrate 30 ml @ 0 mls/hr CONT  PRN IV SEE I/O RECORD;  Start 7/9/20 at 

17:30;  Stop 8/3/20 at 09:45;  Status DC


Fentanyl (Duragesic 12mcg/ Hr Patch) 1 patch Q3DAYS TD  Last administered on 

8/6/20at 08:34;  Start 7/10/20 at 09:00


Sodium Chloride 90 meq/Potassium Chloride 30 meq/ Potassium Acetate 30 

meq/Magnesium Sulfate 15 meq/ Multivitamins 10 ml/Chromium/ Copper/Manganese/ 

Seleni/Zn 1 ml/ Insulin Human Regular 15 unit/ Total Parenteral Nutrition/Amino 

Acids/Dextrose/ Fat Emulsion Intravenous 1,680 ml @  70 mls/hr TPN  CONT IV  

Last administered on 7/10/20at 21:59;  Start 7/10/20 at 22:00;  Stop 7/11/20 at 

21:59;  Status DC


Sodium Chloride 90 meq/Potassium Chloride 30 meq/ Potassium Acetate 30 

meq/Magnesium Sulfate 15 meq/ Multivitamins 10 ml/Chromium/ Copper/Manganese/ 

Seleni/Zn 1 ml/ Insulin Human Regular 15 unit/ Total Parenteral Nutrition/Amino 

Acids/Dextrose/ Fat Emulsion Intravenous 1,680 ml @  70 mls/hr TPN  CONT IV  

Last administered on 7/11/20at 21:35;  Start 7/11/20 at 22:00;  Stop 7/12/20 at 

21:59;  Status DC


Vancomycin HCl (Vanco Per Pharmacy) 1 each PRN DAILY  PRN MC SEE COMMENTS Last 

administered on 7/14/20at 02:46;  Start 7/12/20 at 09:15;  Stop 7/15/20 at 

07:41;  Status DC


Ciprofloxacin/ Dextrose 200 ml @  200 mls/hr Q12HR IV  Last administered on 

7/20/20at 21:02;  Start 7/12/20 at 10:00;  Stop 7/21/20 at 08:20;  Status DC


Vancomycin HCl 2 gm/Sodium Chloride 500 ml @  250 mls/hr 1X  ONCE IV  Last 

administered on 7/12/20at 10:34;  Start 7/12/20 at 10:00;  Stop 7/12/20 at 

11:59;  Status DC


Sodium Chloride 90 meq/Potassium Chloride 30 meq/ Potassium Acetate 30 

meq/Magnesium Sulfate 15 meq/ Multivitamins 10 ml/Chromium/ Copper/Manganese/ 

Seleni/Zn 1 ml/ Insulin Human Regular 15 unit/ Total Parenteral Nutrition/Amino 

Acids/Dextrose/ Fat Emulsion Intravenous 1,680 ml @  70 mls/hr TPN  CONT IV  

Last administered on 7/12/20at 22:02;  Start 7/12/20 at 22:00;  Stop 7/13/20 at 

21:59;  Status DC


Diphenhydramine HCl (Benadryl) 25 mg 1X  ONCE IVP  Last administered on 

7/12/20at 14:26;  Start 7/12/20 at 14:30;  Stop 7/12/20 at 14:31;  Status DC


Vancomycin HCl 1.5 gm/Sodium Chloride 500 ml @  250 mls/hr Q8H IV  Last 

administered on 7/13/20at 03:08;  Start 7/12/20 at 18:30;  Stop 7/13/20 at 

12:24;  Status DC


Vancomycin HCl (Vancomycin Trough Level) 1 each 1X  ONCE MC  Last administered 

on 7/13/20at 10:00;  Start 7/13/20 at 10:00;  Stop 7/13/20 at 10:01;  Status DC


Sodium Chloride 90 meq/Potassium Chloride 30 meq/ Potassium Acetate 30 

meq/Magnesium Sulfate 15 meq/ Multivitamins 10 ml/Chromium/ Copper/Manganese/ 

Seleni/Zn 1 ml/ Insulin Human Regular 15 unit/ Total Parenteral Nutrition/Amino 

Acids/Dextrose/ Fat Emulsion Intravenous 1,680 ml @  70 mls/hr TPN  CONT IV  

Last administered on 7/13/20at 22:13;  Start 7/13/20 at 22:00;  Stop 7/14/20 at 

21:59;  Status DC


Vancomycin HCl (Vancomycin Random Level) 1 each 1X  ONCE MC  Last administered 

on 7/14/20at 01:00;  Start 7/14/20 at 01:00;  Stop 7/14/20 at 01:01;  Status DC


Vancomycin HCl 1.5 gm/Sodium Chloride 500 ml @  250 mls/hr Q12H IV  Last 

administered on 7/14/20at 22:07;  Start 7/14/20 at 10:00;  Stop 7/15/20 at 

07:41;  Status DC


Vancomycin HCl (Vancomycin Trough Level) 1 each 1X  ONCE MC ;  Start 7/15/20 at 

09:30;  Stop 7/15/20 at 09:31;  Status Cancel


Sodium Chloride 90 meq/Potassium Chloride 30 meq/ Potassium Acetate 30 

meq/Magnesium Sulfate 15 meq/ Multivitamins 10 ml/Chromium/ Copper/Manganese/ 

Seleni/Zn 1 ml/ Insulin Human Regular 15 unit/ Total Parenteral Nutrition/Amino 

Acids/Dextrose/ Fat Emulsion Intravenous 1,680 ml @  70 mls/hr TPN  CONT IV  

Last administered on 7/14/20at 22:08;  Start 7/14/20 at 22:00;  Stop 7/15/20 at 

21:59;  Status DC


Alteplase, Recombinant (Cathflo For Central Catheter Clearance) 1 mg 1X  ONCE 

INT CAT  Last administered on 7/14/20at 11:49;  Start 7/14/20 at 11:00;  Stop 

7/14/20 at 11:01;  Status DC


Daptomycin 500 mg/ Sodium Chloride 50 ml @  100 mls/hr Q24H IV  Last 

administered on 7/24/20at 08:25;  Start 7/15/20 at 09:00;  Stop 7/24/20 at 

08:38;  Status DC


Sodium Chloride 90 meq/Potassium Chloride 30 meq/ Potassium Acetate 30 

meq/Magnesium Sulfate 15 meq/ Multivitamins 10 ml/Chromium/ Copper/Manganese/ 

Seleni/Zn 1 ml/ Insulin Human Regular 15 unit/ Total Parenteral Nutrition/Amino 

Acids/Dextrose/ Fat Emulsion Intravenous 1,680 ml @  70 mls/hr TPN  CONT IV  

Last administered on 7/15/20at 22:55;  Start 7/15/20 at 22:00;  Stop 7/16/20 at 

21:59;  Status DC


Sodium Chloride 90 meq/Potassium Chloride 30 meq/ Potassium Acetate 30 

meq/Magnesium Sulfate 15 meq/ Multivitamins 10 ml/Chromium/ Copper/Manganese/ 

Seleni/Zn 1 ml/ Insulin Human Regular 15 unit/ Total Parenteral Nutrition/Amino 

Acids/Dextrose/ Fat Emulsion Intravenous 1,680 ml @  70 mls/hr TPN  CONT IV  

Last administered on 7/16/20at 22:06;  Start 7/16/20 at 22:00;  Stop 7/17/20 at 

21:59;  Status DC


Diphenhydramine HCl (Benadryl) 50 mg STK-MED ONCE .ROUTE ;  Start 7/16/20 at 

18:34;  Stop 7/16/20 at 18:35;  Status DC


Diphenhydramine HCl (Benadryl) 25 mg 1X  ONCE IM ;  Start 7/16/20 at 18:45;  

Stop 7/16/20 at 18:46;  Status DC


Diphenhydramine HCl (Benadryl) 25 mg 1X  ONCE IVP  Last administered on 

7/16/20at 18:56;  Start 7/16/20 at 19:00;  Stop 7/16/20 at 19:01;  Status DC


Alprazolam (Xanax) 0.5 mg PRN TID  PRN PO ANXIETY / AGITATION Last administered 

on 7/23/20at 11:49;  Start 7/17/20 at 08:00;  Stop 7/23/20 at 15:54;  Status DC


Sodium Chloride 110 meq/Potassium Chloride 30 meq/ Potassium Acetate 30 

meq/Magnesium Sulfate 15 meq/ Multivitamins 10 ml/Chromium/ Copper/Manganese/ 

Seleni/Zn 1 ml/ Insulin Human Regular 15 unit/ Total Parenteral Nutrition/Amino 

Acids/Dextrose/ Fat Emulsion Intravenous 1,680 ml @  70 mls/hr TPN  CONT IV  

Last administered on 7/17/20at 21:21;  Start 7/17/20 at 22:00;  Stop 7/18/20 at 

21:59;  Status DC


Sodium Chloride 110 meq/Potassium Chloride 30 meq/ Potassium Acetate 30 

meq/Magnesium Sulfate 15 meq/ Multivitamins 10 ml/Chromium/ Copper/Manganese/ 

Seleni/Zn 1 ml/ Insulin Human Regular 15 unit/ Total Parenteral Nutrition/Amino 

Acids/Dextrose/ Fat Emulsion Intravenous 1,680 ml @  70 mls/hr TPN  CONT IV  

Last administered on 7/18/20at 22:01;  Start 7/18/20 at 22:00;  Stop 7/19/20 at 

21:59;  Status DC


Alteplase, Recombinant (Cathflo For Central Catheter Clearance) 1 mg 1X  ONCE 

INT CAT  Last administered on 7/19/20at 08:23;  Start 7/19/20 at 08:15;  Stop 

7/19/20 at 08:16;  Status DC


Sodium Chloride 110 meq/Sodium Phosphate 10 mmol/ Potassium Chloride 30 meq/ 

Potassium Acetate 30 meq/Magnesium Sulfate 15 meq/ Multivitamins 10 ml/Chromium/

Copper/Manganese/ Seleni/Zn 1 ml/ Insulin Human Regular 15 unit/ Total 

Parenteral Nutrition/Amino Acids/Dextrose/ Fat Emulsion Intravenous 1,680 ml @  

70 mls/hr TPN  CONT IV  Last administered on 7/19/20at 22:03;  Start 7/19/20 at 

22:00;  Stop 7/20/20 at 21:59;  Status DC


Sodium Chloride 120 meq/Sodium Phosphate 10 mmol/ Potassium Chloride 30 meq/ 

Potassium Acetate 30 meq/Magnesium Sulfate 15 meq/ Multivitamins 10 ml/Chromium/

Copper/Manganese/ Seleni/Zn 1 ml/ Insulin Human Regular 15 unit/ Total 

Parenteral Nutrition/Amino Acids/Dextrose/ Fat Emulsion Intravenous 1,680 ml @  

70 mls/hr TPN  CONT IV  Last administered on 7/20/20at 22:08;  Start 7/20/20 at 

22:00;  Stop 7/21/20 at 21:59;  Status DC


Ceftazidime/ Avibactam 2.5 gm/ Sodium Chloride 100 ml @  50 mls/hr Q8HRS IV  

Last administered on 7/22/20at 05:32;  Start 7/21/20 at 14:00;  Stop 7/22/20 at 

07:48;  Status DC


Alteplase, Recombinant (Cathflo For Central Catheter Clearance) 1 mg 1X  ONCE 

INT CAT  Last administered on 7/21/20at 09:30;  Start 7/21/20 at 09:30;  Stop 

7/21/20 at 09:31;  Status DC


Sodium Chloride 120 meq/Sodium Phosphate 10 mmol/ Potassium Chloride 30 meq/ 

Potassium Acetate 30 meq/Magnesium Sulfate 15 meq/ Multivitamins 10 ml/Chromium/

Copper/Manganese/ Seleni/Zn 1 ml/ Insulin Human Regular 15 unit/ Total 

Parenteral Nutrition/Amino Acids/Dextrose/ Fat Emulsion Intravenous 1,680 ml @  

70 mls/hr TPN  CONT IV  Last administered on 7/21/20at 22:11;  Start 7/21/20 at 

22:00;  Stop 7/22/20 at 21:59;  Status DC


Iohexol (Omnipaque 300 Mg/ml) 75 ml 1X  ONCE IV  Last administered on 7/21/20at 

11:30;  Start 7/21/20 at 11:30;  Stop 7/21/20 at 11:31;  Status DC


Info (CONTRAST GIVEN -- Rx MONITORING) 1 each PRN DAILY  PRN MC SEE COMMENTS;  

Start 7/21/20 at 11:45;  Stop 7/23/20 at 11:44;  Status DC


Alteplase, Recombinant (Cathflo For Central Catheter Clearance) 1 mg 1X  ONCE 

INT CAT  Last administered on 7/21/20at 12:17;  Start 7/21/20 at 12:00;  Stop 

7/21/20 at 12:01;  Status DC


Cefepime HCl (Maxipime) 2 gm Q12HR IVP  Last administered on 7/22/20at 20:53;  

Start 7/22/20 at 09:00;  Stop 7/23/20 at 07:30;  Status DC


Sodium Chloride 120 meq/Sodium Phosphate 10 mmol/ Potassium Chloride 30 meq/ 

Potassium Acetate 30 meq/Magnesium Sulfate 15 meq/ Multivitamins 10 ml/Chromium/

Copper/Manganese/ Seleni/Zn 1 ml/ Insulin Human Regular 15 unit/ Total Paren

teral Nutrition/Amino Acids/Dextrose/ Fat Emulsion Intravenous 1,680 ml @  70 

mls/hr TPN  CONT IV  Last administered on 7/22/20at 21:25;  Start 7/22/20 at 

22:00;  Stop 7/23/20 at 21:59;  Status DC


Ceftazidime/ Avibactam 2.5 gm/ Sodium Chloride 250 ml @  125 mls/hr Q8HRS IV  

Last administered on 8/5/20at 05:38;  Start 7/23/20 at 08:00;  Stop 8/5/20 at 

08:20;  Status DC


Sodium Chloride 120 meq/Sodium Phosphate 10 mmol/ Potassium Chloride 30 meq/ 

Potassium Acetate 30 meq/Magnesium Sulfate 15 meq/ Multivitamins 10 ml/Chromium/

Copper/Manganese/ Seleni/Zn 1 ml/ Insulin Human Regular 15 unit/ Total 

Parenteral Nutrition/Amino Acids/Dextrose/ Fat Emulsion Intravenous 1,680 ml @  

70 mls/hr TPN  CONT IV  Last administered on 7/23/20at 22:35;  Start 7/23/20 at 

22:00;  Stop 7/24/20 at 21:59;  Status DC


Alprazolam (Xanax) 0.5 mg PRN QID  PRN PO ANXIETY / AGITATION Last administered 

on 8/3/20at 23:16;  Start 7/23/20 at 16:00


Acetaminophen/ Hydrocodone Bitart (Lortab 5/325) 1 tab PRN Q4HRS  PRN PO PAIN 

Last administered on 7/24/20at 19:34;  Start 7/23/20 at 16:00


Sodium Chloride 120 meq/Sodium Phosphate 10 mmol/ Potassium Chloride 30 meq/ 

Potassium Acetate 30 meq/Magnesium Sulfate 15 meq/ Multivitamins 10 ml/Chromium/

Copper/Manganese/ Seleni/Zn 1 ml/ Insulin Human Regular 15 unit/ Total 

Parenteral Nutrition/Amino Acids/Dextrose/ Fat Emulsion Intravenous 1,680 ml @  

70 mls/hr TPN  CONT IV  Last administered on 7/24/20at 21:50;  Start 7/24/20 at 

22:00;  Stop 7/25/20 at 21:59;  Status DC


Sodium Chloride 120 meq/Sodium Phosphate 10 mmol/ Potassium Chloride 30 meq/ 

Potassium Acetate 30 meq/Magnesium Sulfate 15 meq/ Multivitamins 10 ml/Chromium/

Copper/Manganese/ Seleni/Zn 1 ml/ Insulin Human Regular 15 unit/ Total 

Parenteral Nutrition/Amino Acids/Dextrose/ Fat Emulsion Intravenous 1,680 ml @  

70 mls/hr TPN  CONT IV  Last administered on 7/25/20at 22:14;  Start 7/25/20 at 

22:00;  Stop 7/26/20 at 21:59;  Status DC


Daptomycin 500 mg/ Sodium Chloride 50 ml @  100 mls/hr Q24H IV  Last 

administered on 8/4/20at 09:20;  Start 7/26/20 at 09:00;  Stop 8/5/20 at 08:20; 

Status DC


Sodium Chloride 120 meq/Sodium Phosphate 10 mmol/ Potassium Chloride 30 meq/ 

Potassium Acetate 30 meq/Magnesium Sulfate 15 meq/ Multivitamins 10 ml/Chromium/

Copper/Manganese/ Seleni/Zn 1 ml/ Insulin Human Regular 15 unit/ Total 

Parenteral Nutrition/Amino Acids/Dextrose/ Fat Emulsion Intravenous 1,680 ml @  

70 mls/hr TPN  CONT IV ;  Start 7/26/20 at 22:00;  Stop 7/27/20 at 21:59;  

Status Cancel


Amino Acids/ Glycerin/ Electrolytes 1,000 ml @  80 mls/hr [id number] IV  Last 

administered on 8/1/20at 18:10;  Start 7/26/20 at 10:15;  Stop 8/2/20 at 07:07; 

Status DC


Iohexol (Omnipaque 300 Mg/ml) 50 ml 1X  ONCE IJ ;  Start 7/28/20 at 11:00;  Stop

7/28/20 at 11:01;  Status DC


Sodium Chloride 500 ml @  500 mls/hr 1X  ONCE IV  Last administered on 7/28/20at

18:30;  Start 7/28/20 at 18:30;  Stop 7/28/20 at 19:29;  Status DC


Lidocaine HCl (Buffered Lidocaine 1%) 3 ml 1X  ONCE INJ ;  Start 7/30/20 at 

12:15;  Stop 7/30/20 at 12:17;  Status DC


Fentanyl Citrate (Fentanyl 2ml Vial) 25 mcg PRN Q6HRS  PRN IVP SEE INSTUCTIONS 

Last administered on 8/4/20at 15:09;  Start 8/3/20 at 10:00


Sodium Chloride 1,000 ml @  125 mls/hr Q8H IV  Last administered on 8/3/20at 

23:16;  Start 8/3/20 at 23:21;  Stop 8/4/20 at 09:23;  Status DC


Sodium Chloride 1,000 ml @  350 mls/hr 1X  ONCE IV  Last administered on 

8/3/20at 20:29;  Start 8/3/20 at 20:30;  Stop 8/3/20 at 23:21;  Status DC


Vitamin A/Vitamin D (Vitamin A & D Ointment) 1 thomas PRN Q1HR  PRN TP SKIN 

PROTECTION Last administered on 8/5/20at 23:33;  Start 8/4/20 at 09:15


Iohexol (Omnipaque 240 Mg/ml) 50 ml STK-MED ONCE .ROUTE ;  Start 8/4/20 at 

14:18;  Stop 8/4/20 at 14:19;  Status DC


Lidocaine HCl (Buffered Lidocaine 1%) 3 ml STK-MED ONCE .ROUTE ;  Start 8/4/20 

at 14:19;  Stop 8/4/20 at 14:19;  Status DC


Fentanyl Citrate (Fentanyl 2ml Vial) 100 mcg STK-MED ONCE .ROUTE ;  Start 8/4/20

at 15:03;  Stop 8/4/20 at 15:03;  Status DC


Lidocaine HCl (Buffered Lidocaine 1%) 5 ml 1X  ONCE INJ  Last administered on 

8/4/20at 15:00;  Start 8/4/20 at 15:45;  Stop 8/4/20 at 15:46;  Status DC


Iohexol (Omnipaque 240 Mg/ml) 10 ml 1X  ONCE IJ  Last administered on 8/4/20at 

15:00;  Start 8/4/20 at 15:45;  Stop 8/4/20 at 15:46;  Status DC


Multivitamins/ Minerals Therapeutic (Centrum Multivit-Mineral Liq) 5 ml DAILY 

PEG  Last administered on 8/6/20at 08:32;  Start 8/5/20 at 09:00


Alteplase, Recombinant 5 mg/ Sodium Chloride 30 ml @ 30 mls/hr 1X  PRN IV SEE 

COMMENTS;  Start 8/5/20 at 06:45;  Status UNV


Alteplase, Recombinant (Cathflo For Central Catheter Clearance) 1 mg 1X  ONCE 

INT CAT  Last administered on 8/5/20at 09:30;  Start 8/5/20 at 07:00;  Stop 

8/5/20 at 07:01;  Status DC


Sodium Chloride 1,000 ml @  125 mls/hr 1X  ONCE IV  Last administered on 

8/5/20at 16:12;  Start 8/5/20 at 14:30;  Stop 8/5/20 at 22:29;  Status DC


Furosemide (Lasix) 40 mg 1X  ONCE IVP  Last administered on 8/5/20at 16:17;  

Start 8/5/20 at 14:30;  Stop 8/5/20 at 14:33;  Status DC


Furosemide (Lasix) 40 mg BID92 IVP  Last administered on 8/6/20at 08:33;  Start 

8/6/20 at 09:00;  Stop 8/6/20 at 14:01


Sodium Chloride 1,000 ml @  125 mls/hr 1X  ONCE IV  Last administered on 

8/6/20at 08:20;  Start 8/6/20 at 07:45;  Stop 8/6/20 at 15:44





Active Scripts


Active


Reported


Bisoprolol Fumarate 5 Mg Tablet 10 Mg PO DAILY





Allergies


Allergies:  


Coded Allergies:  


     codeine (Verified  Allergy, Intermediate, rash, 3/16/20)


     I S O L A T I O N *CONTACT* (Verified  Allergy, Unknown, 7/6/20)


   


   CR-PSA





ROS


Review of System


   Per HPI, rest of the ROS is negative





Physical Exam


Physical Exam


GENERAL: pt in bed, NAD 


HEENT: Pupils equal, oral cavity dry. OC/Op - Dry


NECK:  Tracheostomy , no JVD


LUNGS: Diminished aeration bases,Non labored 


HEART:  S1, S2, 


ABDOMEN: bowel sounds hypoactive, soft, ROBERT drains, G-J tube. 


: Chion in place 


EXTREMITIES: Trace generalized edema,.


SKIN: No signs of rash.  


NEURO: - Alert and responsive


Vital Signs





Vital Signs








  Date Time  Temp Pulse Resp B/P (MAP) Pulse Ox O2 Delivery O2 Flow Rate FiO2


 


8/6/20 08:52     97 Room Air  


 


8/6/20 08:34   18     


 


8/6/20 07:00 97.5 109  124/79 (94)    





 97.5       


 


8/5/20 20:00       2.0 








Assessment & Plan


Hypercalcemia - Corrected for Albumin is higher  


Present since 7/26 , Intermittent in past as well  ,iPTH low 


Could be sec to prolonged hospitalization, Check Vit D 


Recommned Calcitonin Sub Q and Monitor Ca , dw Primary  


If persistent check PTHrP , start Bisphosphonates  





Prolonged hospitalization more than 4 months





S/P Exp. 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 

parapsilosis & PSAE (I-merrem/ceftazidime/AZT/cefepime))





Anemia- Chronic, stable  





Acute gallstone pancreatitis with persistent necrosis


 


Ascites s/p paracentesis 4/15 & 5/6. 





Cholelithiasis with thickening of the gallbladder wall.





JED- Required RRT , resolved 





Acute hypoxic resp failure. trach/vent.








Labs


Labs





Laboratory Tests








Test


 8/4/20


12:16 8/4/20


18:27 8/4/20


23:37 8/5/20


05:51


 


Glucose (Fingerstick)


 137 mg/dL


(70-99) 111 mg/dL


(70-99) 102 mg/dL


(70-99) 114 mg/dL


(70-99)


 


Test


 8/5/20


06:00 8/5/20


06:40 8/5/20


12:06 8/5/20


19:10


 


White Blood Count


 7.0 x10^3/uL


(4.0-11.0) 8.2 x10^3/uL


(4.0-11.0) 


 





 


Red Blood Count


 2.09 x10^6/uL


(3.50-5.40) 2.67 x10^6/uL


(3.50-5.40) 


 





 


Hemoglobin


 5.9 g/dL


(12.0-15.5) 7.3 g/dL


(12.0-15.5) 


 





 


Hematocrit


 18.3 %


(36.0-47.0) 22.9 %


(36.0-47.0) 


 





 


Mean Corpuscular Volume 88 fL ()  86 fL ()   


 


Mean Corpuscular Hemoglobin 28 pg (25-35)  27 pg (25-35)   


 


Mean Corpuscular Hemoglobin


Concent 32 g/dL


(31-37) 32 g/dL


(31-37) 


 





 


Red Cell Distribution Width


 18.3 %


(11.5-14.5) 18.2 %


(11.5-14.5) 


 





 


Platelet Count


 465 x10^3/uL


(140-400) 593 x10^3/uL


(140-400) 


 





 


Neutrophils (%) (Auto) 73 % (31-73)  71 % (31-73)   


 


Lymphocytes (%) (Auto) 16 % (24-48)  17 % (24-48)   


 


Monocytes (%) (Auto) 8 % (0-9)  8 % (0-9)   


 


Eosinophils (%) (Auto) 3 % (0-3)  4 % (0-3)   


 


Basophils (%) (Auto) 0 % (0-3)  1 % (0-3)   


 


Neutrophils # (Auto)


 5.1 x10^3/uL


(1.8-7.7) 5.8 x10^3/uL


(1.8-7.7) 


 





 


Lymphocytes # (Auto)


 1.1 x10^3/uL


(1.0-4.8) 1.4 x10^3/uL


(1.0-4.8) 


 





 


Monocytes # (Auto)


 0.5 x10^3/uL


(0.0-1.1) 0.7 x10^3/uL


(0.0-1.1) 


 





 


Eosinophils # (Auto)


 0.2 x10^3/uL


(0.0-0.7) 0.3 x10^3/uL


(0.0-0.7) 


 





 


Basophils # (Auto)


 0.0 x10^3/uL


(0.0-0.2) 0.0 x10^3/uL


(0.0-0.2) 


 





 


Sodium Level


 


 140 mmol/L


(136-145) 


 





 


Potassium Level


 


 4.1 mmol/L


(3.5-5.1) 


 





 


Chloride Level


 


 108 mmol/L


() 


 





 


Carbon Dioxide Level


 


 28 mmol/L


(21-32) 


 





 


Anion Gap  4 (6-14)   


 


Blood Urea Nitrogen  9 mg/dL (7-20)   


 


Creatinine


 


 0.5 mg/dL


(0.6-1.0) 


 





 


Estimated GFR (Non-


American 


 136   (>59) 


 


 





 


Estimated GFR


(Cockcroft-Gault) 


 131.1 


 


 





 


BUN/Creatinine Ratio  18 (6-20)   


 


Glucose Level


 


 114 mg/dL


(70-99) 


 





 


Calcium Level


 


 10.9 mg/dL


(8.5-10.1) 


 





 


Total Bilirubin


 


 0.3 mg/dL


(0.2-1.0) 


 





 


Aspartate Amino Transf


(AST/SGOT) 


 12 U/L (15-37) 


 


 





 


Alanine Aminotransferase


(ALT/SGPT) 


 12 U/L (14-59) 


 


 





 


Alkaline Phosphatase


 


 82 U/L


() 


 





 


Total Protein


 


 5.1 g/dL


(6.4-8.2) 


 





 


Albumin


 


 1.3 g/dL


(3.4-5.0) 


 





 


Albumin/Globulin Ratio  0.3 (1.0-1.7)   


 


EGFR   157   (>59)   


 


PTH (Intact) Specimen


Description 


 Comment (.) 


 


 





 


Parathyroid Hormone (Intact)


 


 2 pg/mL


(15-65) 


 





 


Calcium (PTH Intact)


 


 11.1 mg/dL


(8.7-10.2) 


 





 


Creatinine (PTH Intact)


 


 0.29 mg/dL


(0.57-1.00) 


 





 


Phosphorus (PTH Intact)


 


 4.3 mg/dL


(3.0-4.3) 


 





 


Glucose (Fingerstick)


 


 


 132 mg/dL


(70-99) 140 mg/dL


(70-99)


 


Test


 8/6/20


01:14 8/6/20


05:40 8/6/20


05:43 





 


Glucose (Fingerstick)


 136 mg/dL


(70-99) 


 141 mg/dL


(70-99) 





 


White Blood Count


 


 9.3 x10^3/uL


(4.0-11.0) 


 





 


Red Blood Count


 


 2.90 x10^6/uL


(3.50-5.40) 


 





 


Hemoglobin


 


 8.0 g/dL


(12.0-15.5) 


 





 


Hematocrit


 


 25.1 %


(36.0-47.0) 


 





 


Mean Corpuscular Volume  86 fL ()   


 


Mean Corpuscular Hemoglobin  28 pg (25-35)   


 


Mean Corpuscular Hemoglobin


Concent 


 32 g/dL


(31-37) 


 





 


Red Cell Distribution Width


 


 17.7 %


(11.5-14.5) 


 





 


Platelet Count


 


 646 x10^3/uL


(140-400) 


 





 


Neutrophils (%) (Auto)  77 % (31-73)   


 


Lymphocytes (%) (Auto)  14 % (24-48)   


 


Monocytes (%) (Auto)  7 % (0-9)   


 


Eosinophils (%) (Auto)  2 % (0-3)   


 


Basophils (%) (Auto)  1 % (0-3)   


 


Neutrophils # (Auto)


 


 7.2 x10^3/uL


(1.8-7.7) 


 





 


Lymphocytes # (Auto)


 


 1.3 x10^3/uL


(1.0-4.8) 


 





 


Monocytes # (Auto)


 


 0.6 x10^3/uL


(0.0-1.1) 


 





 


Eosinophils # (Auto)


 


 0.2 x10^3/uL


(0.0-0.7) 


 





 


Basophils # (Auto)


 


 0.0 x10^3/uL


(0.0-0.2) 


 





 


Sodium Level


 


 139 mmol/L


(136-145) 


 





 


Potassium Level


 


 3.7 mmol/L


(3.5-5.1) 


 





 


Chloride Level


 


 107 mmol/L


() 


 





 


Carbon Dioxide Level


 


 29 mmol/L


(21-32) 


 





 


Anion Gap  3 (6-14)   


 


Blood Urea Nitrogen  9 mg/dL (7-20)   


 


Creatinine


 


 0.6 mg/dL


(0.6-1.0) 


 





 


Estimated GFR


(Cockcroft-Gault) 


 106.3 


 


 





 


Glucose Level


 


 144 mg/dL


(70-99) 


 





 


Calcium Level


 


 11.3 mg/dL


(8.5-10.1) 


 











Laboratory Tests








Test


 8/5/20


12:06 8/5/20


19:10 8/6/20


01:14 8/6/20


05:40


 


Glucose (Fingerstick)


 132 mg/dL


(70-99) 140 mg/dL


(70-99) 136 mg/dL


(70-99) 





 


White Blood Count


 


 


 


 9.3 x10^3/uL


(4.0-11.0)


 


Red Blood Count


 


 


 


 2.90 x10^6/uL


(3.50-5.40)


 


Hemoglobin


 


 


 


 8.0 g/dL


(12.0-15.5)


 


Hematocrit


 


 


 


 25.1 %


(36.0-47.0)


 


Mean Corpuscular Volume    86 fL () 


 


Mean Corpuscular Hemoglobin    28 pg (25-35) 


 


Mean Corpuscular Hemoglobin


Concent 


 


 


 32 g/dL


(31-37)


 


Red Cell Distribution Width


 


 


 


 17.7 %


(11.5-14.5)


 


Platelet Count


 


 


 


 646 x10^3/uL


(140-400)


 


Neutrophils (%) (Auto)    77 % (31-73) 


 


Lymphocytes (%) (Auto)    14 % (24-48) 


 


Monocytes (%) (Auto)    7 % (0-9) 


 


Eosinophils (%) (Auto)    2 % (0-3) 


 


Basophils (%) (Auto)    1 % (0-3) 


 


Neutrophils # (Auto)


 


 


 


 7.2 x10^3/uL


(1.8-7.7)


 


Lymphocytes # (Auto)


 


 


 


 1.3 x10^3/uL


(1.0-4.8)


 


Monocytes # (Auto)


 


 


 


 0.6 x10^3/uL


(0.0-1.1)


 


Eosinophils # (Auto)


 


 


 


 0.2 x10^3/uL


(0.0-0.7)


 


Basophils # (Auto)


 


 


 


 0.0 x10^3/uL


(0.0-0.2)


 


Sodium Level


 


 


 


 139 mmol/L


(136-145)


 


Potassium Level


 


 


 


 3.7 mmol/L


(3.5-5.1)


 


Chloride Level


 


 


 


 107 mmol/L


()


 


Carbon Dioxide Level


 


 


 


 29 mmol/L


(21-32)


 


Anion Gap    3 (6-14) 


 


Blood Urea Nitrogen    9 mg/dL (7-20) 


 


Creatinine


 


 


 


 0.6 mg/dL


(0.6-1.0)


 


Estimated GFR


(Cockcroft-Gault) 


 


 


 106.3 





 


Glucose Level


 


 


 


 144 mg/dL


(70-99)


 


Calcium Level


 


 


 


 11.3 mg/dL


(8.5-10.1)


 


Test


 8/6/20


05:43 


 


 





 


Glucose (Fingerstick)


 141 mg/dL


(70-99) 


 


 











Review


All relevant outside records, renal labs, imaging studies, telemetry/EKG's were 

reviewed.











KIMBERLY MYERS MD                 Aug 6, 2020 11:20

## 2020-08-06 NOTE — PDOC
SURGICAL PROGRESS NOTE


DATE: 8/6/20 


TIME: 15:26


Subjective


up in chair


no complaints


Vital Signs





Vital Signs








  Date Time  Temp Pulse Resp B/P (MAP) Pulse Ox O2 Delivery O2 Flow Rate FiO2


 


8/6/20 13:50   20   Room Air  


 


8/6/20 11:00 97.5 110  140/88 (105) 96   





 97.5       


 


8/5/20 20:00       2.0 








I&O











Intake and Output 


 


 8/6/20





 07:00


 


Intake Total 1996 ml


 


Output Total 2950 ml


 


Balance -954 ml


 


 


 


Intake Oral 0 ml


 


IV Total 675 ml


 


Tube Feeding 1021 ml


 


Blood Product IV Normal Saline Flush 60 ml


 


Other 240 ml


 


Output Urine Total 1425 ml


 


Drainage Total 1525 ml


 


# Bowel Movements 4








General:  Alert, Oriented X3, Cooperative


Abdomen:  Soft, Other (g/j tube in place)


Labs





Laboratory Tests








Test


 8/4/20


18:27 8/4/20


23:37 8/5/20


05:51 8/5/20


06:00


 


Glucose (Fingerstick)


 111 mg/dL


(70-99) 102 mg/dL


(70-99) 114 mg/dL


(70-99) 





 


White Blood Count


 


 


 


 7.0 x10^3/uL


(4.0-11.0)


 


Red Blood Count


 


 


 


 2.09 x10^6/uL


(3.50-5.40)


 


Hemoglobin


 


 


 


 5.9 g/dL


(12.0-15.5)


 


Hematocrit


 


 


 


 18.3 %


(36.0-47.0)


 


Mean Corpuscular Volume    88 fL () 


 


Mean Corpuscular Hemoglobin    28 pg (25-35) 


 


Mean Corpuscular Hemoglobin


Concent 


 


 


 32 g/dL


(31-37)


 


Red Cell Distribution Width


 


 


 


 18.3 %


(11.5-14.5)


 


Platelet Count


 


 


 


 465 x10^3/uL


(140-400)


 


Neutrophils (%) (Auto)    73 % (31-73) 


 


Lymphocytes (%) (Auto)    16 % (24-48) 


 


Monocytes (%) (Auto)    8 % (0-9) 


 


Eosinophils (%) (Auto)    3 % (0-3) 


 


Basophils (%) (Auto)    0 % (0-3) 


 


Neutrophils # (Auto)


 


 


 


 5.1 x10^3/uL


(1.8-7.7)


 


Lymphocytes # (Auto)


 


 


 


 1.1 x10^3/uL


(1.0-4.8)


 


Monocytes # (Auto)


 


 


 


 0.5 x10^3/uL


(0.0-1.1)


 


Eosinophils # (Auto)


 


 


 


 0.2 x10^3/uL


(0.0-0.7)


 


Basophils # (Auto)


 


 


 


 0.0 x10^3/uL


(0.0-0.2)


 


Test


 8/5/20


06:40 8/5/20


12:06 8/5/20


19:10 8/6/20


01:14


 


White Blood Count


 8.2 x10^3/uL


(4.0-11.0) 


 


 





 


Red Blood Count


 2.67 x10^6/uL


(3.50-5.40) 


 


 





 


Hemoglobin


 7.3 g/dL


(12.0-15.5) 


 


 





 


Hematocrit


 22.9 %


(36.0-47.0) 


 


 





 


Mean Corpuscular Volume 86 fL ()    


 


Mean Corpuscular Hemoglobin 27 pg (25-35)    


 


Mean Corpuscular Hemoglobin


Concent 32 g/dL


(31-37) 


 


 





 


Red Cell Distribution Width


 18.2 %


(11.5-14.5) 


 


 





 


Platelet Count


 593 x10^3/uL


(140-400) 


 


 





 


Neutrophils (%) (Auto) 71 % (31-73)    


 


Lymphocytes (%) (Auto) 17 % (24-48)    


 


Monocytes (%) (Auto) 8 % (0-9)    


 


Eosinophils (%) (Auto) 4 % (0-3)    


 


Basophils (%) (Auto) 1 % (0-3)    


 


Neutrophils # (Auto)


 5.8 x10^3/uL


(1.8-7.7) 


 


 





 


Lymphocytes # (Auto)


 1.4 x10^3/uL


(1.0-4.8) 


 


 





 


Monocytes # (Auto)


 0.7 x10^3/uL


(0.0-1.1) 


 


 





 


Eosinophils # (Auto)


 0.3 x10^3/uL


(0.0-0.7) 


 


 





 


Basophils # (Auto)


 0.0 x10^3/uL


(0.0-0.2) 


 


 





 


Sodium Level


 140 mmol/L


(136-145) 


 


 





 


Potassium Level


 4.1 mmol/L


(3.5-5.1) 


 


 





 


Chloride Level


 108 mmol/L


() 


 


 





 


Carbon Dioxide Level


 28 mmol/L


(21-32) 


 


 





 


Anion Gap 4 (6-14)    


 


Blood Urea Nitrogen 9 mg/dL (7-20)    


 


Creatinine


 0.5 mg/dL


(0.6-1.0) 


 


 





 


Estimated GFR (Non-


American 136   (>59) 


 


 


 





 


Estimated GFR


(Cockcroft-Gault) 131.1 


 


 


 





 


BUN/Creatinine Ratio 18 (6-20)    


 


Glucose Level


 114 mg/dL


(70-99) 


 


 





 


Calcium Level


 10.9 mg/dL


(8.5-10.1) 


 


 





 


Total Bilirubin


 0.3 mg/dL


(0.2-1.0) 


 


 





 


Aspartate Amino Transf


(AST/SGOT) 12 U/L (15-37) 


 


 


 





 


Alanine Aminotransferase


(ALT/SGPT) 12 U/L (14-59) 


 


 


 





 


Alkaline Phosphatase


 82 U/L


() 


 


 





 


Total Protein


 5.1 g/dL


(6.4-8.2) 


 


 





 


Albumin


 1.3 g/dL


(3.4-5.0) 


 


 





 


Albumin/Globulin Ratio 0.3 (1.0-1.7)    


 


EGFR  157   (>59)    


 


PTH (Intact) Specimen


Description Comment (.) 


 


 


 





 


Parathyroid Hormone (Intact)


 2 pg/mL


(15-65) 


 


 





 


Calcium (PTH Intact)


 11.1 mg/dL


(8.7-10.2) 


 


 





 


Creatinine (PTH Intact)


 0.29 mg/dL


(0.57-1.00) 


 


 





 


Phosphorus (PTH Intact)


 4.3 mg/dL


(3.0-4.3) 


 


 





 


Glucose (Fingerstick)


 


 132 mg/dL


(70-99) 140 mg/dL


(70-99) 136 mg/dL


(70-99)


 


Test


 8/6/20


05:40 8/6/20


05:43 


 





 


White Blood Count


 9.3 x10^3/uL


(4.0-11.0) 


 


 





 


Red Blood Count


 2.90 x10^6/uL


(3.50-5.40) 


 


 





 


Hemoglobin


 8.0 g/dL


(12.0-15.5) 


 


 





 


Hematocrit


 25.1 %


(36.0-47.0) 


 


 





 


Mean Corpuscular Volume 86 fL ()    


 


Mean Corpuscular Hemoglobin 28 pg (25-35)    


 


Mean Corpuscular Hemoglobin


Concent 32 g/dL


(31-37) 


 


 





 


Red Cell Distribution Width


 17.7 %


(11.5-14.5) 


 


 





 


Platelet Count


 646 x10^3/uL


(140-400) 


 


 





 


Neutrophils (%) (Auto) 77 % (31-73)    


 


Lymphocytes (%) (Auto) 14 % (24-48)    


 


Monocytes (%) (Auto) 7 % (0-9)    


 


Eosinophils (%) (Auto) 2 % (0-3)    


 


Basophils (%) (Auto) 1 % (0-3)    


 


Neutrophils # (Auto)


 7.2 x10^3/uL


(1.8-7.7) 


 


 





 


Lymphocytes # (Auto)


 1.3 x10^3/uL


(1.0-4.8) 


 


 





 


Monocytes # (Auto)


 0.6 x10^3/uL


(0.0-1.1) 


 


 





 


Eosinophils # (Auto)


 0.2 x10^3/uL


(0.0-0.7) 


 


 





 


Basophils # (Auto)


 0.0 x10^3/uL


(0.0-0.2) 


 


 





 


Sodium Level


 139 mmol/L


(136-145) 


 


 





 


Potassium Level


 3.7 mmol/L


(3.5-5.1) 


 


 





 


Chloride Level


 107 mmol/L


() 


 


 





 


Carbon Dioxide Level


 29 mmol/L


(21-32) 


 


 





 


Anion Gap 3 (6-14)    


 


Blood Urea Nitrogen 9 mg/dL (7-20)    


 


Creatinine


 0.6 mg/dL


(0.6-1.0) 


 


 





 


Estimated GFR


(Cockcroft-Gault) 106.3 


 


 


 





 


Glucose Level


 144 mg/dL


(70-99) 


 


 





 


Calcium Level


 11.3 mg/dL


(8.5-10.1) 


 


 





 


Glucose (Fingerstick)


 


 141 mg/dL


(70-99) 


 











Laboratory Tests








Test


 8/5/20


19:10 8/6/20


01:14 8/6/20


05:40 8/6/20


05:43


 


Glucose (Fingerstick)


 140 mg/dL


(70-99) 136 mg/dL


(70-99) 


 141 mg/dL


(70-99)


 


White Blood Count


 


 


 9.3 x10^3/uL


(4.0-11.0) 





 


Red Blood Count


 


 


 2.90 x10^6/uL


(3.50-5.40) 





 


Hemoglobin


 


 


 8.0 g/dL


(12.0-15.5) 





 


Hematocrit


 


 


 25.1 %


(36.0-47.0) 





 


Mean Corpuscular Volume   86 fL ()  


 


Mean Corpuscular Hemoglobin   28 pg (25-35)  


 


Mean Corpuscular Hemoglobin


Concent 


 


 32 g/dL


(31-37) 





 


Red Cell Distribution Width


 


 


 17.7 %


(11.5-14.5) 





 


Platelet Count


 


 


 646 x10^3/uL


(140-400) 





 


Neutrophils (%) (Auto)   77 % (31-73)  


 


Lymphocytes (%) (Auto)   14 % (24-48)  


 


Monocytes (%) (Auto)   7 % (0-9)  


 


Eosinophils (%) (Auto)   2 % (0-3)  


 


Basophils (%) (Auto)   1 % (0-3)  


 


Neutrophils # (Auto)


 


 


 7.2 x10^3/uL


(1.8-7.7) 





 


Lymphocytes # (Auto)


 


 


 1.3 x10^3/uL


(1.0-4.8) 





 


Monocytes # (Auto)


 


 


 0.6 x10^3/uL


(0.0-1.1) 





 


Eosinophils # (Auto)


 


 


 0.2 x10^3/uL


(0.0-0.7) 





 


Basophils # (Auto)


 


 


 0.0 x10^3/uL


(0.0-0.2) 





 


Sodium Level


 


 


 139 mmol/L


(136-145) 





 


Potassium Level


 


 


 3.7 mmol/L


(3.5-5.1) 





 


Chloride Level


 


 


 107 mmol/L


() 





 


Carbon Dioxide Level


 


 


 29 mmol/L


(21-32) 





 


Anion Gap   3 (6-14)  


 


Blood Urea Nitrogen   9 mg/dL (7-20)  


 


Creatinine


 


 


 0.6 mg/dL


(0.6-1.0) 





 


Estimated GFR


(Cockcroft-Gault) 


 


 106.3 


 





 


Glucose Level


 


 


 144 mg/dL


(70-99) 





 


Calcium Level


 


 


 11.3 mg/dL


(8.5-10.1) 











Problem List


Problems


Medical Problems:


(1) Acute pancreatitis


Status: Acute  





(2) Cholelithiasis


Status: Acute  








Assessment/Plan


supportive care





Justicifation of Admission Dx:


Justifications for Admission:


Justification of Admission Dx:  Yes











SAURAV NASH APRN             Aug 6, 2020 15:27

## 2020-08-06 NOTE — PDOC
Infectious Disease Note


Subjective


Subjective


awake, says feeling ok, trach





Vital Sign


Vital Signs





Vital Signs








  Date Time  Temp Pulse Resp B/P (MAP) Pulse Ox O2 Delivery O2 Flow Rate FiO2


 


8/6/20 03:36      Room Air  


 


8/6/20 03:00  109 22 158/104 (122) 96   


 


8/5/20 22:49 98.3       





 98.3       


 


8/5/20 20:00       2.0 











Physical Exam


PHYSICAL EXAM


GENERAL: pt in bed, appears weak -comfortable -alert


HEENT: Pupils equal, oral cavity dry. OC/Op - Dry


NECK:  Tracheostomy - no JVD


LUNGS: Diminished aeration bases,


HEART:  S1, S2, 


ABDOMEN: Less Distended mild- bowel sounds hypoactive, soft, richardson x 2, ROBERT 

drains, G-J tube. Some drainage around R quad tube again and mild insertion site

irritation


: [place] in place 


EXTREMITIES: Trace generalized edema, no cyanosis. Yeast in groin area


SKIN: warm touch. No signs of rash.  


LUE- Previous PICC site without signs of complications. PIV s clean


NEURO: - Alert and responsive


PICC RUE - clean


EC fistula





Labs


Lab





Laboratory Tests








Test


 8/5/20


12:06 8/5/20


19:10 8/6/20


01:14 8/6/20


05:40


 


Glucose (Fingerstick)


 132 mg/dL


(70-99) 140 mg/dL


(70-99) 136 mg/dL


(70-99) 





 


White Blood Count


 


 


 


 9.3 x10^3/uL


(4.0-11.0)


 


Red Blood Count


 


 


 


 2.90 x10^6/uL


(3.50-5.40)


 


Hemoglobin


 


 


 


 8.0 g/dL


(12.0-15.5)


 


Hematocrit


 


 


 


 25.1 %


(36.0-47.0)


 


Mean Corpuscular Volume    86 fL () 


 


Mean Corpuscular Hemoglobin    28 pg (25-35) 


 


Mean Corpuscular Hemoglobin


Concent 


 


 


 32 g/dL


(31-37)


 


Red Cell Distribution Width


 


 


 


 17.7 %


(11.5-14.5)


 


Platelet Count


 


 


 


 646 x10^3/uL


(140-400)


 


Neutrophils (%) (Auto)    77 % (31-73) 


 


Lymphocytes (%) (Auto)    14 % (24-48) 


 


Monocytes (%) (Auto)    7 % (0-9) 


 


Eosinophils (%) (Auto)    2 % (0-3) 


 


Basophils (%) (Auto)    1 % (0-3) 


 


Neutrophils # (Auto)


 


 


 


 7.2 x10^3/uL


(1.8-7.7)


 


Lymphocytes # (Auto)


 


 


 


 1.3 x10^3/uL


(1.0-4.8)


 


Monocytes # (Auto)


 


 


 


 0.6 x10^3/uL


(0.0-1.1)


 


Eosinophils # (Auto)


 


 


 


 0.2 x10^3/uL


(0.0-0.7)


 


Basophils # (Auto)


 


 


 


 0.0 x10^3/uL


(0.0-0.2)


 


Sodium Level


 


 


 


 139 mmol/L


(136-145)


 


Potassium Level


 


 


 


 3.7 mmol/L


(3.5-5.1)


 


Chloride Level


 


 


 


 107 mmol/L


()


 


Carbon Dioxide Level


 


 


 


 29 mmol/L


(21-32)


 


Anion Gap    3 (6-14) 


 


Blood Urea Nitrogen    9 mg/dL (7-20) 


 


Creatinine


 


 


 


 0.6 mg/dL


(0.6-1.0)


 


Estimated GFR


(Cockcroft-Gault) 


 


 


 106.3 





 


Glucose Level


 


 


 


 144 mg/dL


(70-99)


 


Calcium Level


 


 


 


 11.3 mg/dL


(8.5-10.1)


 


Test


 8/6/20


05:43 


 


 





 


Glucose (Fingerstick)


 141 mg/dL


(70-99) 


 


 











Micro





BC neg





Objective


Assessment


Patient with prolonged hospitalization more than 4 months


Multiple medical problems


Multiple surgical procedures





URINE with parapsilosis


S/P Exp. 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 

parapsilosis & PSAE (I-merrem/ceftazidime/AZT/cefepime))


Leukocytosis - better


Loose stool but on tube feed - WBC down and no gross fever


Fever - 7/25 c-diff neg


Anemia


Acute gallstone pancreatitis with persistent necrosis


  - 4/9.  CT A/P Increased ascites. Persistent evidence of necrotizing 

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


  - 4/27. status post ROBERT drain placement; C. parapsilosis. s/p drain 5/6 + yeast

& high amylase; s/p additional drain on 5/8. Drains removed. 


  -5/6. fluid  candida parapsilosis fluid, amylase high


  - 6/6 showed multiple pseudocysts, slight larger on the right. s/p drains x 3,

6/7.  + PSAE (MDRO-R Cefepime, Zosyn ANSON < 64) and yeast, 


  -6/7 s/p drain replacement x 3; fluid cult PSAE (MDRO), yeast; treated


  -7/12 CT A/P shows smaller fluid collections.  


  -722 CT abdomen and pelvis drains in place       


Ascites s/p paracentesis 4/15 & 5/6. C. parapsilosis 


Cholelithiasis with thickening of the gallbladder wall.


JED, Hyperkalemia, Metabolic acidosis off dialysis


Acute hypoxic resp failure. trach/vent. sputum 6/13  + PSAE (I merrem) ; sputum 

culture July 19+ for PSAE R Merrem, sensitive to cefepime


Pleural effusion status post CTS left side


 Abdominal fluid culture 6 /7 MDRO Pseudomonas, yeast


Sputum culture positive 7/19 for MDRO Pseudomonas


Chest tube fluid positive for 7/21 Candida Parapsilosis


EC fistula





Plan


Plan of Care








[place] changed 7/27


d/c antibiotics


Nystatin to groin


UA and urine culture Blood culture/Cath tip neg - neg


C. difficile negative


Monitor WBC/temp 


Wound care /drain management as directed


Contact isolation for CRE/MDRO








Long term prognosis  poor





D/w nursing











LINN FRANZ MD                Aug 6, 2020 08:38

## 2020-08-06 NOTE — NUR
Pt is experiencing difficulty getting comfortable in any position except on her back.  
Multiple adjustments of pillows, wedge, legs, HOB attemted.  

Redness of face and neck noted after administration of calcitonin by day shift appears to be 
improving.

Pt performed her own oral care after set-up.

## 2020-08-06 NOTE — NUR
SS following up with discharge planning. SS reviewed pt chart and discussed with pt RN. Pt's 
daughter Glenna contacted SS via phone on 8/5/2020 and discussed discharge planning. Pt's 
daughter reported that she was sending paperwork needed for [facility] to further assist 
them. No paperwork received at this time. Pt is currently on room air. Wound care following 
for drainage sites. Pt wearing speaking valve intermittently. Pt on tube feeds. Pt Max 
Assist with PT/OT. COVID19 negative. SS received phone contact from Adult Protective 
Services and discussed pt's case. Adult Protective Services coming to meet with pt in the 
hospital and will contact pt's children to discuss. SS will continue to follow for discharge 
planning.

## 2020-08-06 NOTE — NUR
RN Note:

Shortly after administering calcitonin, pt's neck developed a red rash. Pt's family 
approached the desk and stated, "It's spreading." This RN observed the rash had spread from 
neck to face and chest. No difficulty breathing at this time. Dr. Booker notified. No 
orders received. Will continue to monitor.

## 2020-08-06 NOTE — PDOC
Date of Service:


DATE: 8/6/20 


TIME: 10:39





Objective:


Vital Signs:





                                   Vital Signs








  Date Time  Temp Pulse Resp B/P (MAP) Pulse Ox O2 Delivery O2 Flow Rate FiO2


 


8/6/20 08:52     97 Room Air  


 


8/6/20 08:34   18     


 


8/6/20 07:00 97.5 109  124/79 (94)    





 97.5       


 


8/5/20 20:00       2.0 








Labs:





Laboratory Tests








Test


 8/5/20


12:06 8/5/20


19:10 8/6/20


01:14 8/6/20


05:40


 


Glucose (Fingerstick) 132 mg/dL  140 mg/dL  136 mg/dL  


 


White Blood Count    9.3 x10^3/uL 


 


Red Blood Count    2.90 x10^6/uL 


 


Hemoglobin    8.0 g/dL 


 


Hematocrit    25.1 % 


 


Mean Corpuscular Volume    86 fL 


 


Mean Corpuscular Hemoglobin    28 pg 


 


Mean Corpuscular Hemoglobin


Concent 


 


 


 32 g/dL 





 


Red Cell Distribution Width    17.7 % 


 


Platelet Count    646 x10^3/uL 


 


Neutrophils (%) (Auto)    77 % 


 


Lymphocytes (%) (Auto)    14 % 


 


Monocytes (%) (Auto)    7 % 


 


Eosinophils (%) (Auto)    2 % 


 


Basophils (%) (Auto)    1 % 


 


Neutrophils # (Auto)    7.2 x10^3/uL 


 


Lymphocytes # (Auto)    1.3 x10^3/uL 


 


Monocytes # (Auto)    0.6 x10^3/uL 


 


Eosinophils # (Auto)    0.2 x10^3/uL 


 


Basophils # (Auto)    0.0 x10^3/uL 


 


Sodium Level    139 mmol/L 


 


Potassium Level    3.7 mmol/L 


 


Chloride Level    107 mmol/L 


 


Carbon Dioxide Level    29 mmol/L 


 


Anion Gap    3 


 


Blood Urea Nitrogen    9 mg/dL 


 


Creatinine    0.6 mg/dL 


 


Estimated GFR


(Cockcroft-Gault) 


 


 


 106.3 





 


Glucose Level    144 mg/dL 


 


Calcium Level    11.3 mg/dL 


 


Test


 8/6/20


05:43 


 


 





 


Glucose (Fingerstick) 141 mg/dL    











PE:





GEN: chronically ill - in bed, RT present


NEURO/PSYCH: less interactive today





A/P:


S/p pancreatic necrosectomy, complications





--


Continue support.





Justicifation of Admission Dx:


Justifications for Admission:


Justification of Admission Dx:  Yes











RAGHAVENDRA MURCIA          Aug 6, 2020 10:41

## 2020-08-06 NOTE — PDOC
[facility] PROGRESS NOTE


Date of Service


DOS:


DATE: 8/6/20 


TIME: 07:42





Chief Complaint


Chief Complaint


A/P:


S/P Exp. 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 

parapsilosis & PSAE (I-merrem/ceftazidime/AZT/cefepime))


Leucocytosis -stable


[id number] last night


Acute gallstone pancreatitis with persistent necrosis


Acute hypoxic Respiratory failure required mechanical ventilation


Tracheostomy Wednesday.


bilateral pleural effusions/pulm edema s/p Throacentesis on 6/15/2020


Severe Acute gallstone pancreatitis (not a surgical candidate at this time) with

necrosis


Acute kidney failure now requiring dialysis


Gallstones (Calculus of gallbladder with acute cholecystitis without 

obstruction)


HTN 


Intractable pain


Intractable nausea


Covid 19 negative. 


Acute on chronic anemia 


EEG: No seizure activity


Fever  - intermittent 


? Ileus with vomiting


Abd distention - U/S and CT reviewed s/p 0.4 L of opaque, debris-containing 

ascites was removed 5/6


Acute pancreatitis with persistent necrosis


Gallstone pancreatitis with necrosis. 


   -CT A/P 6/6 showed multiple pseudocysts, slight larger on the right. s/p 

drains x 3, 6/7.  + PSAE (MDRO-R Cefepime, Zosyn ANSON < 64) and yeast, 


   -s/p drain 4/27. C. parapsilosis. s/p drain 5/6 + yeast & high amylase; s/p 

additional drain on 5/8. Drains removed. 


Ascites s/p paracentesis 4/15 & 5/6. C. parapsilosis 


JED. off HD. 


A large fluid collection in the pancreatic bed has slightly decreased in size, 

described below, the pancreas itself is difficult to  visualize, which could be 

due to necrosis or obscuration of pancreatic  parenchyma from the surrounding 

fluid collection.6/15 


- 4/27 status post ROBERT drain placement + C paropsilosis. s/p additional drains 

5/8


Anemia - S/p PRBCs. 


Cholelithiasis with thickening of the gallbladder wall.


Leucocytosis improving


JED, hyperkalemia, Metabolic acidosis off dialysis


hypocalcemia 


Prediabetes


HTN


s/p trach


Hyperglycemia


severe protein-caloric malnutrition


Moderate to large left pleural effusion with atelectasis and collapse  of most 

of the left lower lobe, stable


Extensive retroperitoneal fluid collections persist. Percutaneous  drains remain

within the collections in both paracolic gutters. These  communicate with 

additional pelvic and peripancreatic collections.





History of Present Illness


History of Present Illness


Hb stable. Afebrile. Weak. Calcium increased. Shortness of breath is improving. 

Plan for calcitonin today, lasix, and saline





8/5: Hemoglobin abnormally low on repeat has been stable 7.2.  Calcium up to 

10.9.  She is able to work with PT, she is asking to eat.  Social work is been 

having difficulties with both of her daughters completing the financial aspect 

of disability paperwork which is been prolonging her stay over the past 5 

months.  Plan to check PTH and to treat hypercalcemia with 1 L normal saline 40 

mg of IV Lasix and repeat labs in a.m.





8/4: Not wishing to wear her speaking valve. J tube having some difficulties. 

Afebrile. Rolf bedside to aid her. transferred from ICU today


8/3: No overnight events. Calcium 10.7 on AM labs. She is still a bit slow to to

respond, but follows commands well. Does not want speaking valve with me. Pain 

is better controlled in her abdomen. Wound care to see today. Will check liver 

function and phos levels given her calcium elevation.





8/2/2020


Patient seen and examined bedside.  Positive fluid balance in the past 24 hours.

 Patient's chart, labs, images were reviewed and discussed with RN





8/1/2020


No acute events overnight.  Seen and examined at bedside.  Patient had a fever 

100.2.  Negative fluid balance of 150 cc.  Patient's chart, labs, images were 

reviewed and discussed with RN





7/31/2020


Patient seen and examined at bedside.  No acute events overnight.  Positive 

fluid balance 633.  Patient's chart, labs, images were reviewed and discussed 

with RN








7/30/2020


Patient seen and examined bedside.  No acute events overnight.  Patient's chart,

labs, images were reviewed and discussed with RN








7/28/2020


Patient seen and examined bedside.  Patient appears to be in no obvious pain.  

All drains have been adequately draining.  Patient continues to be on TPN.  

Chart labs and imaging was reviewed.  Negative fluid balance of 270 cc


7/27/2020


Patient seen and examined in the ICU.  Patient still requires extensive care.  

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  Chart, labs, imaging was 

reviewed.  UOP with + 500cc balance.








7/25 /2020





Patient seen in and examined in the ICU


She is still extremely critically ill


Appears weak, frail, and pale


Better color today with improved eye contact and expression


Discussed with RN


Chart reviewed











 








7/21/2020


Patient seen and examined in the ICU


She is still extremely critically ill


Appears extremely weak frail and pale


Discussed with RN


Chart reviewed





Vitals/I&O


Vitals/I&O:





                                   Vital Signs








  Date Time  Temp Pulse Resp B/P (MAP) Pulse Ox O2 Delivery O2 Flow Rate FiO2


 


8/6/20 03:36      Room Air  


 


8/6/20 03:00  109 22 158/104 (122) 96   


 


8/5/20 22:49 98.3       





 98.3       


 


8/5/20 20:00       2.0 














                                    I & O   


 


 8/5/20 8/5/20 8/6/20





 15:00 23:00 07:00


 


Intake Total 100 ml 1796 ml 100 ml


 


Output Total 150 ml 2075 ml 725 ml


 


Balance -50 ml -279 ml -625 ml











Physical Exam


Physical Exam:


GENERAL: pt in bed, appears weak -comfortable -alert


HEENT: Pupils equal, oral cavity dry. OC/Op - Dry


NECK:  Tracheostomy - no JVD


LUNGS: Diminished aeration bases,


HEART:  S1, S2, 


ABDOMEN: Less Distended mild- bowel sounds hypoactive, soft, richardson x 2, ROBERT 

drains, G-J tube. Some drainage around R quad tube again and mild insertion site

irritation


: [place] in place 


EXTREMITIES: Trace generalized edema, no cyanosis. Yeast in groin area


SKIN: warm touch. No signs of rash.  


LUE- Previous PICC site without signs of complications. PIV s clean


NEURO: - Alert and responsive


PICC RUE - clean


EC fistula


General:  Alert, Cooperative, No acute distress


Heart:  Other (distant heart sounds, tachycardic)


Lungs:  Clear


Abdomen:  Soft, No tenderness


Extremities:  Other (Diffuse edema)


Skin:  No rashes, No significant lesion





Labs


Labs:





Laboratory Tests








Test


 8/5/20


12:06 8/5/20


19:10 8/6/20


01:14 8/6/20


05:40


 


Glucose (Fingerstick)


 132 mg/dL


(70-99) 140 mg/dL


(70-99) 136 mg/dL


(70-99) 





 


White Blood Count


 


 


 


 9.3 x10^3/uL


(4.0-11.0)


 


Red Blood Count


 


 


 


 2.90 x10^6/uL


(3.50-5.40)


 


Hemoglobin


 


 


 


 8.0 g/dL


(12.0-15.5)


 


Hematocrit


 


 


 


 25.1 %


(36.0-47.0)


 


Mean Corpuscular Volume    86 fL () 


 


Mean Corpuscular Hemoglobin    28 pg (25-35) 


 


Mean Corpuscular Hemoglobin


Concent 


 


 


 32 g/dL


(31-37)


 


Red Cell Distribution Width


 


 


 


 17.7 %


(11.5-14.5)


 


Platelet Count


 


 


 


 646 x10^3/uL


(140-400)


 


Neutrophils (%) (Auto)    77 % (31-73) 


 


Lymphocytes (%) (Auto)    14 % (24-48) 


 


Monocytes (%) (Auto)    7 % (0-9) 


 


Eosinophils (%) (Auto)    2 % (0-3) 


 


Basophils (%) (Auto)    1 % (0-3) 


 


Neutrophils # (Auto)


 


 


 


 7.2 x10^3/uL


(1.8-7.7)


 


Lymphocytes # (Auto)


 


 


 


 1.3 x10^3/uL


(1.0-4.8)


 


Monocytes # (Auto)


 


 


 


 0.6 x10^3/uL


(0.0-1.1)


 


Eosinophils # (Auto)


 


 


 


 0.2 x10^3/uL


(0.0-0.7)


 


Basophils # (Auto)


 


 


 


 0.0 x10^3/uL


(0.0-0.2)


 


Sodium Level


 


 


 


 139 mmol/L


(136-145)


 


Potassium Level


 


 


 


 3.7 mmol/L


(3.5-5.1)


 


Chloride Level


 


 


 


 107 mmol/L


()


 


Carbon Dioxide Level


 


 


 


 29 mmol/L


(21-32)


 


Anion Gap    3 (6-14) 


 


Blood Urea Nitrogen    9 mg/dL (7-20) 


 


Creatinine


 


 


 


 0.6 mg/dL


(0.6-1.0)


 


Estimated GFR


(Cockcroft-Gault) 


 


 


 106.3 





 


Glucose Level


 


 


 


 144 mg/dL


(70-99)


 


Calcium Level


 


 


 


 11.3 mg/dL


(8.5-10.1)


 


Test


 8/6/20


05:43 


 


 





 


Glucose (Fingerstick)


 141 mg/dL


(70-99) 


 


 














Assessment and Plan


Assessmemt and Plan


Problems


Medical Problems:


(1) Acute pancreatitis


Status: Acute  





(2) Cholelithiasis


Status: Acute  











Comment


Review of Relevant


I have reviewed the following items josy (where applicable) has been applied.


Medications:





Current Medications








 Medications


  (Trade)  Dose


 Ordered  Sig/Yee


 Route


 PRN Reason  Start Time


 Stop Time Status Last Admin


Dose Admin


 


 Multivitamins/


 Minerals


 Therapeutic


  (Centrum


 Multivit-Mineral


 Liq)  5 ml  DAILY


 PEG


   8/5/20 09:00


    8/5/20 09:27





 


 Sodium Chloride  1,000 ml @ 


 125 mls/hr  1X  ONCE


 IV


   8/5/20 14:30


 8/5/20 22:29 DC 8/5/20 16:12





 


 Furosemide


  (Lasix)  40 mg  1X  ONCE


 IVP


   8/5/20 14:30


 8/5/20 14:33 DC 8/5/20 16:17














Justicifation of Admission Dx:


Justifications for Admission:


Justification of Admission Dx:  Yes











GAETANO GONCALVES MD         Aug 6, 2020 07:42

## 2020-08-07 VITALS — SYSTOLIC BLOOD PRESSURE: 147 MMHG | DIASTOLIC BLOOD PRESSURE: 84 MMHG

## 2020-08-07 VITALS — SYSTOLIC BLOOD PRESSURE: 129 MMHG | DIASTOLIC BLOOD PRESSURE: 99 MMHG

## 2020-08-07 VITALS — SYSTOLIC BLOOD PRESSURE: 155 MMHG | DIASTOLIC BLOOD PRESSURE: 89 MMHG

## 2020-08-07 VITALS — SYSTOLIC BLOOD PRESSURE: 139 MMHG | DIASTOLIC BLOOD PRESSURE: 72 MMHG

## 2020-08-07 VITALS — DIASTOLIC BLOOD PRESSURE: 86 MMHG | SYSTOLIC BLOOD PRESSURE: 133 MMHG

## 2020-08-07 VITALS — DIASTOLIC BLOOD PRESSURE: 89 MMHG | SYSTOLIC BLOOD PRESSURE: 123 MMHG

## 2020-08-07 VITALS — SYSTOLIC BLOOD PRESSURE: 138 MMHG | DIASTOLIC BLOOD PRESSURE: 79 MMHG

## 2020-08-07 LAB
ALBUMIN SERPL-MCNC: 1.8 G/DL (ref 3.4–5)
ANION GAP SERPL CALC-SCNC: 6 MMOL/L (ref 6–14)
BUN SERPL-MCNC: 10 MG/DL (ref 7–20)
CALCIUM SERPL-MCNC: 10.9 MG/DL (ref 8.5–10.1)
CHLORIDE SERPL-SCNC: 107 MMOL/L (ref 98–107)
CO2 SERPL-SCNC: 29 MMOL/L (ref 21–32)
CREAT SERPL-MCNC: 0.7 MG/DL (ref 0.6–1)
GFR SERPLBLD BASED ON 1.73 SQ M-ARVRAT: 88.9 ML/MIN
GLUCOSE SERPL-MCNC: 137 MG/DL (ref 70–99)
PHOSPHATE SERPL-MCNC: 3.4 MG/DL (ref 2.6–4.7)
POTASSIUM SERPL-SCNC: 3.7 MMOL/L (ref 3.5–5.1)
SODIUM SERPL-SCNC: 142 MMOL/L (ref 136–145)

## 2020-08-07 RX ADMIN — ONDANSETRON PRN MG: 2 INJECTION INTRAMUSCULAR; INTRAVENOUS at 21:50

## 2020-08-07 RX ADMIN — INSULIN LISPRO SCH UNITS: 100 INJECTION, SOLUTION INTRAVENOUS; SUBCUTANEOUS at 05:37

## 2020-08-07 RX ADMIN — IPRATROPIUM BROMIDE AND ALBUTEROL SULFATE SCH ML: .5; 3 SOLUTION RESPIRATORY (INHALATION) at 10:49

## 2020-08-07 RX ADMIN — CALCITONIN SALMON SCH UNIT: 200 INJECTION, SOLUTION INTRAMUSCULAR; SUBCUTANEOUS at 09:00

## 2020-08-07 RX ADMIN — IPRATROPIUM BROMIDE AND ALBUTEROL SULFATE SCH ML: .5; 3 SOLUTION RESPIRATORY (INHALATION) at 23:29

## 2020-08-07 RX ADMIN — MULTIVITAMIN SCH ML: LIQUID ORAL at 08:02

## 2020-08-07 RX ADMIN — ACETYLCYSTEINE SCH MG: 200 INHALANT RESPIRATORY (INHALATION) at 19:55

## 2020-08-07 RX ADMIN — IPRATROPIUM BROMIDE AND ALBUTEROL SULFATE SCH ML: .5; 3 SOLUTION RESPIRATORY (INHALATION) at 04:08

## 2020-08-07 RX ADMIN — METOPROLOL TARTRATE PRN MG: 5 INJECTION INTRAVENOUS at 11:55

## 2020-08-07 RX ADMIN — INSULIN LISPRO SCH UNITS: 100 INJECTION, SOLUTION INTRAVENOUS; SUBCUTANEOUS at 23:14

## 2020-08-07 RX ADMIN — IPRATROPIUM BROMIDE AND ALBUTEROL SULFATE SCH ML: .5; 3 SOLUTION RESPIRATORY (INHALATION) at 16:15

## 2020-08-07 RX ADMIN — INSULIN LISPRO SCH UNITS: 100 INJECTION, SOLUTION INTRAVENOUS; SUBCUTANEOUS at 00:00

## 2020-08-07 RX ADMIN — INSULIN LISPRO SCH UNITS: 100 INJECTION, SOLUTION INTRAVENOUS; SUBCUTANEOUS at 18:00

## 2020-08-07 RX ADMIN — IPRATROPIUM BROMIDE AND ALBUTEROL SULFATE SCH ML: .5; 3 SOLUTION RESPIRATORY (INHALATION) at 07:04

## 2020-08-07 RX ADMIN — IPRATROPIUM BROMIDE AND ALBUTEROL SULFATE SCH ML: .5; 3 SOLUTION RESPIRATORY (INHALATION) at 19:55

## 2020-08-07 RX ADMIN — INSULIN LISPRO SCH UNITS: 100 INJECTION, SOLUTION INTRAVENOUS; SUBCUTANEOUS at 12:00

## 2020-08-07 RX ADMIN — PANTOPRAZOLE SODIUM SCH MG: 40 INJECTION, POWDER, FOR SOLUTION INTRAVENOUS at 08:01

## 2020-08-07 RX ADMIN — ACETYLCYSTEINE SCH MG: 200 INHALANT RESPIRATORY (INHALATION) at 07:04

## 2020-08-07 RX ADMIN — ENOXAPARIN SODIUM SCH MG: 40 INJECTION SUBCUTANEOUS at 08:01

## 2020-08-07 NOTE — PDOC
Date of Service:


DATE: 8/7/20 


TIME: 11:00





Objective:


Objective:


D/w nurse - same, lots of drainage, some loose stools.


Vital Signs:





                                   Vital Signs








  Date Time  Temp Pulse Resp B/P (MAP) Pulse Ox O2 Delivery O2 Flow Rate FiO2


 


8/7/20 10:50     95 Room Air  


 


8/7/20 07:00 96.3 120 22 123/89 (100)    





 96.3       








Labs:





Laboratory Tests








Test


 8/6/20


18:30 8/7/20


00:27 8/7/20


05:27


 


Glucose (Fingerstick)


 117 mg/dL


(70-99) 128 mg/dL


(70-99) 132 mg/dL


(70-99)











PE:





GEN: chronically ill


LUNGS: trach/room air


HEART: tachycardic


ABD: ostomy bag right side w/ drainage


NEURO/PSYCH: awake, doesn't attempt much communication





A/P:


S/p pancreatic necrosectomy





--


Continue support.





Justicifation of Admission Dx:


Justifications for Admission:


Justification of Admission Dx:  Yes











RAGHAVENDRA MURCIA          Aug 7, 2020 11:02

## 2020-08-07 NOTE — PDOC
PULMONARY PROGRESS NOTES


DATE: 8/7/20 


TIME: 12:06


Subjective


Resting comfortable on trach W/ room air 


no complaints, no overnight concerns from nursing


Vitals





Vital Signs








  Date Time  Temp Pulse Resp B/P (MAP) Pulse Ox O2 Delivery O2 Flow Rate FiO2


 


8/7/20 11:55  140  155/89    


 


8/7/20 11:00 97.5  22  98 Room Air  





 97.5       








ROS:  No Nausea, No Chest Pain, No Abdominal Pain, No Increase Cough


General:  Alert


HEENT:  Other (trach site CDI)


Lungs:  Clear


Cardiovascular:  S1, S2


Abdomen:  Soft, Non-tender, Other (multiple ROBERT drains )


Neuro Exam:  Alert


Extremities:  Other (+1 BLE edema)


Skin:  Warm


Labs





Laboratory Tests








Test


 8/5/20


19:10 8/6/20


01:14 8/6/20


05:40 8/6/20


05:43


 


Glucose (Fingerstick)


 140 mg/dL


(70-99) 136 mg/dL


(70-99) 


 141 mg/dL


(70-99)


 


White Blood Count


 


 


 9.3 x10^3/uL


(4.0-11.0) 





 


Red Blood Count


 


 


 2.90 x10^6/uL


(3.50-5.40) 





 


Hemoglobin


 


 


 8.0 g/dL


(12.0-15.5) 





 


Hematocrit


 


 


 25.1 %


(36.0-47.0) 





 


Mean Corpuscular Volume   86 fL ()  


 


Mean Corpuscular Hemoglobin   28 pg (25-35)  


 


Mean Corpuscular Hemoglobin


Concent 


 


 32 g/dL


(31-37) 





 


Red Cell Distribution Width


 


 


 17.7 %


(11.5-14.5) 





 


Platelet Count


 


 


 646 x10^3/uL


(140-400) 





 


Neutrophils (%) (Auto)   77 % (31-73)  


 


Lymphocytes (%) (Auto)   14 % (24-48)  


 


Monocytes (%) (Auto)   7 % (0-9)  


 


Eosinophils (%) (Auto)   2 % (0-3)  


 


Basophils (%) (Auto)   1 % (0-3)  


 


Neutrophils # (Auto)


 


 


 7.2 x10^3/uL


(1.8-7.7) 





 


Lymphocytes # (Auto)


 


 


 1.3 x10^3/uL


(1.0-4.8) 





 


Monocytes # (Auto)


 


 


 0.6 x10^3/uL


(0.0-1.1) 





 


Eosinophils # (Auto)


 


 


 0.2 x10^3/uL


(0.0-0.7) 





 


Basophils # (Auto)


 


 


 0.0 x10^3/uL


(0.0-0.2) 





 


Sodium Level


 


 


 139 mmol/L


(136-145) 





 


Potassium Level


 


 


 3.7 mmol/L


(3.5-5.1) 





 


Chloride Level


 


 


 107 mmol/L


() 





 


Carbon Dioxide Level


 


 


 29 mmol/L


(21-32) 





 


Anion Gap   3 (6-14)  


 


Blood Urea Nitrogen   9 mg/dL (7-20)  


 


Creatinine


 


 


 0.6 mg/dL


(0.6-1.0) 





 


Estimated GFR


(Cockcroft-Gault) 


 


 106.3 


 





 


Glucose Level


 


 


 144 mg/dL


(70-99) 





 


Calcium Level


 


 


 11.3 mg/dL


(8.5-10.1) 





 


Test


 8/6/20


18:30 8/7/20


00:27 8/7/20


05:25 8/7/20


05:27


 


Glucose (Fingerstick)


 117 mg/dL


(70-99) 128 mg/dL


(70-99) 


 132 mg/dL


(70-99)


 


Sodium Level


 


 


 142 mmol/L


(136-145) 





 


Potassium Level


 


 


 3.7 mmol/L


(3.5-5.1) 





 


Chloride Level


 


 


 107 mmol/L


() 





 


Carbon Dioxide Level


 


 


 29 mmol/L


(21-32) 





 


Anion Gap   6 (6-14)  


 


Blood Urea Nitrogen


 


 


 10 mg/dL


(7-20) 





 


Creatinine


 


 


 0.7 mg/dL


(0.6-1.0) 





 


Estimated GFR


(Cockcroft-Gault) 


 


 88.9 


 





 


Glucose Level


 


 


 137 mg/dL


(70-99) 





 


Calcium Level


 


 


 10.9 mg/dL


(8.5-10.1) 





 


Phosphorus Level


 


 


 3.4 mg/dL


(2.6-4.7) 





 


Albumin


 


 


 1.8 g/dL


(3.4-5.0) 











Laboratory Tests








Test


 8/6/20


18:30 8/7/20


00:27 8/7/20


05:25 8/7/20


05:27


 


Glucose (Fingerstick)


 117 mg/dL


(70-99) 128 mg/dL


(70-99) 


 132 mg/dL


(70-99)


 


Sodium Level


 


 


 142 mmol/L


(136-145) 





 


Potassium Level


 


 


 3.7 mmol/L


(3.5-5.1) 





 


Chloride Level


 


 


 107 mmol/L


() 





 


Carbon Dioxide Level


 


 


 29 mmol/L


(21-32) 





 


Anion Gap   6 (6-14)  


 


Blood Urea Nitrogen


 


 


 10 mg/dL


(7-20) 





 


Creatinine


 


 


 0.7 mg/dL


(0.6-1.0) 





 


Estimated GFR


(Cockcroft-Gault) 


 


 88.9 


 





 


Glucose Level


 


 


 137 mg/dL


(70-99) 





 


Calcium Level


 


 


 10.9 mg/dL


(8.5-10.1) 





 


Phosphorus Level


 


 


 3.4 mg/dL


(2.6-4.7) 





 


Albumin


 


 


 1.8 g/dL


(3.4-5.0) 











Medications





Active Scripts








 Medications  Dose


 Route/Sig


 Max Daily Dose Days Date Category


 


 Bisoprolol


 Fumarate 5 Mg


 Tablet  10 Mg


 PO DAILY


   3/16/20 Reported








Comments








ct reviewed 7/12/20, Decreased left-sided effusion after catheter placement. The




right-sided effusion has increased as has atelectasis.


 





 


There has been exchange or placement of multiple drainage 


tubes and a gastrojejunostomy tube. Both collections are smaller. No 


significant new abdominal fluid collection is seen. The jejunal component 


of the gastrojejunostomy tube appears to be looped in the proximal small 


bowel. 











ct abdomen /pelvis 6/6


1. Removal of the percutaneous pigtail drainage catheters since the prior 


exam. Sequela of pancreatitis with extensive pseudocysts again 


demonstrated, the right-sided collections are slightly larger since the 


prior exam, the left-sided collections are stable. See above.


2. Moderate to large left pleural effusion with atelectasis and collapse 


of most of the left lower lobe, stable. Small right pleural effusion is 


stable.


3. Gallstone.





ct chest 6/15 reviewed








 GRAM NEG COCCOBACILLI:MANY


        SQUAMOUS EPI CELL:RARE


        PMN (WBCs):[id number]


        Unless otherwise specified, Testing Performed by:


        [address] [address]


        For Inquires, the Physician may contact the Microbiology


        department at [phone]





  RESPIRATORY CULTURE  Final  


        Final





        MANY GRAM NEGATIVE RODS on 06/15/20 at 1106


        FINAL ID= [PSEUDOMONAS [id number]]


        MICRO CHARGES


        PSEUDOMONAS AERUGINOSA





  ANTIMICROBIAL SUSCEPTIBILITY  Final  


        Comment





        NEG ANSON 56


        PSEUDOMONAS AERUGINOSA


        ANTIBIOTIC                        RESULT          INTERPRETATION


        AMIKACIN                          <=16                  S


        AZTREONAM                         <=4                   S


        CEFTAZIDIME                       <=1                   S


        CIPROFLOXACIN                     <=0.25                S


        CEFEPIME                          <=2                   S


        CEFTAZIDIME/AVIBACTAM             <=4                   S


        GENTAMICIN                        <=2                   S


        LEVOFLOXACIN                      <=0.5                 S





Impression


.


IMPRESSION:


1.  Acute hypoxemic respiratory failure secondary to ARDS status post trach, 

developed anemia 6/7, blood drainage from RLQ abdomen drain site, and 

surrounding firmness  / developed septic shock 6/7 from abdomen source, required

levo 6/7-- now on room air with trach 


s/p 3 new drains 6/7 with brown color drainage, --- off pressors


S/P Exp. 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 

parapsilosis & PSAE (I-merrem/ceftazidime/AZT/cefepime))


Leucocytosis -improved


Fever---resolved


Acute gallstone pancreatitis with persistent necrosis


  - 4/9.  CT A/P Increased ascites. Persistent evidence of necrotizing 

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


  - 4/27. status post ROBERT drain placement; C. parapsilosis. s/p drain 5/6 + yeast

& high amylase; s/p additional drain on 5/8. Drains removed. 


  -5/6. fluid  candida parapsilosis fluid, amylase high


  - 6/6 showed multiple pseudocysts, slight larger on the right. s/p drains x 3,

6/7.  + PSAE (MDRO-R Cefepime, Zosyn ANSON < 64) and yeast, 


  -6/7 s/p drain replacement x 3; fluid cult PSAE (MDRO), yeast; treated


  -7/12 CT A/P shows smaller fluid collections.  


  -722 CT abdomen and pelvis drains in place       


Ascites s/p paracentesis 4/15 & 5/6. C. parapsilosis 


Cholelithiasis with thickening of the gallbladder wall.


JED, Hyperkalemia, Metabolic acidosis off dialysis


Acute hypoxic resp failure. trach/vent. sputum 6/13  + PSAE (I merrem) ; sputum 

culture July 19+ for PSAE R Merrem, sensitive to cefepime


Pleural effusion status post CTS left side


Abdominal fluid culture 6 /7 MDRO Pseudomonas, yeast


Sputum culture positive 7/19 for MDRO Pseudomonas


Chest tube fluid positive for 7/21 Candida Parapsilosis


S/P Exploratory laparotomy, lysis of adhesions, subtotal cholecystectomy with 

cholangiogram, gastrojejunostomy tube placement, pancreatic necrosectomy














Plan


.


Stable from pulmonary stand point 


chest tube REMOVED 7/24


Transfuse as needed, monitor HGB


Antibiotics per ID, now off ABX 


Trach shield, as tolerated, on room air-- attempt PMV/Capped, will plan to de-

cannulate next week 


Up to chair, PT/OT


Follow surgery input-- no further surgical plans 


DVT GI prophylaxis


f/u BC /resp cultures 





DVT/GI PPX








Will see PRN call with any concerns 














SHARYN SOLORZANO MD                  Aug 7, 2020 12:08

## 2020-08-07 NOTE — NUR
SS following up with discharge planning. SS reviewed pt chart and discussed with pt RN. Pt 
is currently on room air. Pt on tube feeds and wearing speaking valve intermittently. Per 
RN, pt will possibly have trach taken out on Monday if no plans from surgery. Pt has ostomy 
equipment over drainage sites. Pt Max assist with PT/OT. SS will continue to follow for 
discharge planning.

## 2020-08-07 NOTE — PDOC
DATE OF SERVICE


DATE: 8/7/20 


TIME: 13:53





SUBJECTIVE


ROS


stable  , Recieved Calcitonin last night, developed rash, am dose held 


Sitting up in chair, requesting anxiety med





OBJECTIVE


Vital Signs





Vital Signs








  Date Time  Temp Pulse Resp B/P (MAP) Pulse Ox O2 Delivery O2 Flow Rate FiO2


 


8/7/20 11:55  140  155/89    


 


8/7/20 11:00 97.5  22  98 Room Air  





 97.5       








I & 0











Intake and Output 


 


 8/7/20





 07:00


 


Intake Total 1759 ml


 


Output Total 3225 ml


 


Balance -1466 ml


 


 


 


Intake Oral 0 ml


 


Tube Feeding 1258 ml


 


Other 501 ml


 


Output Urine Total 1550 ml


 


Drainage Total 1675 ml


 


# Bowel Movements 3











PHYSICAL EXAM


Physical Exam


GENERAL:sitting up in chair,  NAD 


HEENT: Pupils equal, oral cavity dry. OC/Op - Dry


NECK:  Tracheostomy , no JVD


LUNGS: Diminished aeration bases,Non labored 


HEART:  S1, S2, 


ABDOMEN: bowel sounds hypoactive, soft, ROBERT drains, G-J tube. 


: [place] in place 


EXTREMITIES: Trace generalized edema,.


SKIN: No signs of rash.  


NEURO: - Alert and responsive





DIAGNOSIS/ASSESSMENT


Assessment & Plan


Hypercalcemia - Corrected for Albumin is higher  


Present since 7/26 , Intermittent in past as well  ,iPTH low 


Could be sec to prolonged hospitalization, Unable to check Vit D 


 Devloped rash  with  Calcitonin and no improvement in ca , recomned switching 

to Bisphosphonate , Dw Primary  


If persistent check PTHrP   





Prolonged hospitalization more than 4 months





S/P Exp. 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 

parapsilosis & PSAE (I-merrem/ceftazidime/AZT/cefepime))





Anemia- Chronic, stable  





Acute gallstone pancreatitis with persistent necrosis


 


Ascites s/p paracentesis 4/15 & 5/6. 





Cholelithiasis with thickening of the gallbladder wall.





JED- Required RRT , resolved , Normal Cr  





Acute hypoxic resp failure. trach/vent.





COMMENT/RELEVANT DATA


Meds





Current Medications








 Medications


  (Trade)  Dose


 Ordered  Sig/Yee  Start Time


 Stop Time Status Last Admin


Dose Admin


 


 Acetaminophen


  (Tylenol Supp)  650 mg  PRN Q6HRS  PRN  3/24/20 10:30


    8/3/20 15:24


650 MG


 


 Acetaminophen


  (Tylenol)  650 mg  PRN Q6HRS  PRN  3/21/20 03:36


 5/13/20 10:25 DC 4/16/20 19:56


650 MG


 


 Acetaminophen/


 Hydrocodone Bitart


  (Lortab 5/325)  1 tab  PRN Q4HRS  PRN  7/23/20 16:00


    8/6/20 13:50


1 TAB


 


 Acetylcysteine


  (Mucomyst 20%


 Resp Treatment)  600 mg  RTBID  6/27/20 12:00


    8/7/20 07:04


600 MG


 


 Albumin Human  500 ml @ 


 125 mls/hr  PRN Q1HR  PRN  6/30/20 15:45


 8/3/20 09:52 DC  





 


 Albuterol Sulfate


  (Ventolin Neb


 Soln)  2.5 mg  1X  ONCE  3/17/20 22:30


 3/17/20 22:31 DC 3/18/20 00:56


2.5 MG


 


 Albuterol/


 Ipratropium


  (Duoneb)  3 ml  Q4HRS  6/13/20 08:00


    8/7/20 10:49


3 ML


 


 Alprazolam


  (Xanax)  0.5 mg  PRN QID  PRN  7/23/20 16:00


    8/7/20 08:01


0.5 MG


 


 Alteplase,


 Recombinant


  (Cathflo For


 Central Catheter


 Clearance)  1 mg  1X  ONCE  8/5/20 07:00


 8/5/20 07:01 DC 8/5/20 09:30


1 MG


 


 Alteplase,


 Recombinant 4 mg/


 Sodium Chloride  20 ml @ 20


 mls/hr  1X  ONCE  6/17/20 10:00


 6/17/20 10:59 DC 6/17/20 10:09


20 MLS/HR


 


 Alteplase,


 Recombinant 5 mg/


 Sodium Chloride  30 ml @ 30


 mls/hr  1X  PRN  8/5/20 06:45


   UNV  





 


 Amino Acids/


 Glycerin/


 Electrolytes  1,000 ml @ 


 80 mls/hr  [id number]  7/26/20 10:15


 8/2/20 07:07 DC 8/1/20 18:10


80 MLS/HR


 


 Artificial Tears


  (Artificial


 Tears)  1 drop  PRN Q15MIN  PRN  4/29/20 05:30


    6/23/20 21:17


1 DROP


 


 Atenolol


  (Tenormin)  100 mg  DAILY  3/17/20 09:00


 3/16/20 20:08 DC  





 


 Atropine Sulfate


  (ATROPINE 0.5mg


 SYRINGE)  0.5 mg  PRN Q5MIN  PRN  4/2/20 08:15


 8/3/20 09:45 DC  





 


 Barium Sulfate


  (Varibar Thin


 Liquid Apple)  148 gm  1X  ONCE  5/26/20 11:45


 5/26/20 11:49 DC  





 


 Benzocaine


  (Hurricaine One)  1 spray  1X  ONCE  3/20/20 14:30


 3/20/20 14:31 DC 3/20/20 16:38


1 SPRAY


 


 Bisacodyl


  (Dulcolax Supp)  10 mg  STK-MED ONCE  4/27/20 10:59


 4/27/20 10:59 DC  





 


 Bumetanide


  (Bumex)  2 mg  DAILY  5/8/20 10:00


 5/18/20 17:15 DC 5/18/20 08:07


2 MG


 


 Bupivacaine HCl/


 Epinephrine Bitart


  (Sensorcain-Epi


 0.5%-1:961752 Mpf)  30 ml  STK-MED ONCE  6/30/20 08:34


 6/30/20 08:35 DC  





 


 Calcitonin [place]


  (Miacalcin)  400 unit  BID  8/6/20 18:00


    8/6/20 18:18


400 UNIT


 


 Calcium Carbonate/


 Glycine


  (Tums)  500 mg  PRN [id number]  PRN  3/18/20 17:45


 5/13/20 10:25 DC  





 


 Calcium Chloride


 1000 mg/Sodium


 Chloride  110 ml @ 


 220 mls/hr  1X  ONCE  3/17/20 22:30


 3/17/20 22:59 DC 3/17/20 22:11


220 MLS/HR


 


 Calcium Chloride


 3000 mg/Sodium


 Chloride  1,030 ml @ 


 50 mls/hr  [id number]  3/19/20 08:00


 3/21/20 15:23 DC 3/21/20 02:17


50 MLS/HR


 


 Calcium Gluconate


  (Calcium


 Gluconate)  2,000 mg  1X  ONCE  3/19/20 02:15


 3/19/20 02:16 DC 3/19/20 02:19


2,000 MG


 


 Calcium Gluconate


 1000 mg/Sodium


 Chloride  110 ml @ 


 220 mls/hr  1X  ONCE  3/18/20 03:30


 3/18/20 03:59 DC 3/18/20 03:21


220 MLS/HR


 


 Calcium Gluconate


 2000 mg/Sodium


 Chloride  120 ml @ 


 220 mls/hr  1X  ONCE  3/18/20 07:30


 3/18/20 08:02 DC 3/18/20 09:05


220 MLS/HR


 


 Cefepime HCl


  (Maxipime)  2 gm  Q12HR  7/22/20 09:00


 7/23/20 07:30 DC 7/22/20 20:53


2 GM


 


 Ceftazidime/


 Avibactam 2.5 gm/


 Sodium Chloride  250 ml @ 


 125 mls/hr  Q8HRS  7/23/20 08:00


 8/5/20 08:20 DC 8/5/20 05:38


125 MLS/HR


 


 Cellulose


  (Surgicel


 Fibrillar 1x2)  1 each  STK-MED ONCE  4/6/20 11:00


 4/6/20 11:01 DC  





 


 Cellulose


  (Surgicel


 Hemostat 2x14)  1 each  STK-MED ONCE  4/27/20 10:58


 4/27/20 10:59 DC  





 


 Cellulose


  (Surgicel


 Hemostat 4x8)  1 each  STK-MED ONCE  4/27/20 10:58


 4/27/20 10:59 DC  





 


 Chlorhexidine


 Gluconate


  (Peridex)  15 ml  BID  6/13/20 09:00


 6/13/20 07:58 DC  





 


 Ciprofloxacin/


 Dextrose  200 ml @ 


 200 mls/hr  Q12HR  7/12/20 10:00


 7/21/20 08:20 DC 7/20/20 21:02


200 MLS/HR


 


 Cyclobenzaprine


 HCl


  (Flexeril)  10 mg  PRN Q6HRS  PRN  4/30/20 10:45


    7/10/20 19:12


10 MG


 


 Daptomycin 410 mg/


 Sodium Chloride  50 ml @ 


 100 mls/hr  Q24H  6/7/20 14:00


 6/10/20 08:30 DC 6/9/20 13:33


100 MLS/HR


 


 Daptomycin 430 mg/


 Sodium Chloride  50 ml @ 


 100 mls/hr  Q24H  4/25/20 13:00


 4/30/20 20:58 DC 4/30/20 13:00


100 MLS/HR


 


 Daptomycin 450 mg/


 Sodium Chloride  50 ml @ 


 100 mls/hr  Q24H  5/17/20 09:00


 5/21/20 08:30 DC 5/20/20 09:25


100 MLS/HR


 


 Daptomycin 485 mg/


 Sodium Chloride  50 ml @ 


 100 mls/hr  Q24H  5/4/20 11:00


 5/12/20 07:44 DC 5/11/20 13:10


100 MLS/HR


 


 Daptomycin 500 mg/


 Sodium Chloride  50 ml @ 


 100 mls/hr  Q24H  7/26/20 09:00


 8/5/20 08:20 DC 8/4/20 09:20


100 MLS/HR


 


 Desflurane


  (Suprane)  90 ml  STK-MED ONCE  6/30/20 10:18


 6/30/20 10:19 DC  





 


 Dexamethasone


 Sodium Phosphate


  (Decadron)  4 mg  STK-MED ONCE  4/27/20 10:56


 4/27/20 10:57 DC  





 


 Dexmedetomidine


 HCl 400 mcg/


 Sodium Chloride  100 ml @ 0


 mls/hr  CONT  PRN  4/2/20 08:15


 5/30/20 18:31 DC 5/30/20 12:57


8 MLS/HR


 


 Dextrose


  (Dextrose


 50%-Water Syringe)  12.5 gm  PRN Q15MIN  PRN  3/16/20 09:30


     





 


 Digoxin


  (Lanoxin)  125 mcg  1X  ONCE  3/19/20 18:00


 3/19/20 18:01 DC 3/19/20 17:10


125 MCG


 


 Diphenhydramine


 HCl


  (Benadryl)  25 mg  1X  ONCE  7/16/20 19:00


 7/16/20 19:01 DC 7/16/20 18:56


25 MG


 


 Duloxetine HCl


  (Cymbalta)  30 mg  DAILY  5/10/20 14:00


 5/13/20 10:25 DC 5/11/20 09:48


30 MG


 


 Enoxaparin Sodium


  (Lovenox 100mg


 Syringe)  100 mg  Q12HR  4/21/20 21:00


   UNV  





 


 Enoxaparin Sodium


  (Lovenox 40mg


 Syringe)  40 mg  Q24H  7/1/20 08:00


    8/7/20 08:01


40 MG


 


 Ephedrine Sulfate


  (ePHEDrine PF IN


 SALINE SYRINGE)  50 mg  STK-MED ONCE  6/30/20 14:45


 6/30/20 14:45 DC  





 


 Etomidate


  (Amidate)  8 mg  1X  ONCE  3/23/20 08:30


 3/23/20 08:31 DC 3/23/20 08:33


8 MG


 


 Fentanyl


  (Duragesic 12mcg/


 Hr Patch)  1 patch  Q3DAYS  7/10/20 09:00


    8/6/20 08:34


1 PATCH


 


 Fentanyl


  (Duragesic 50mcg/


 Hr Patch)  1 patch  Q72H  6/4/20 21:00


 6/13/20 12:00 DC 6/4/20 21:22


1 PATCH


 


 Fentanyl Citrate


  (Fentanyl 2ml


 Vial)  100 mcg  STK-MED ONCE  8/4/20 15:03


 8/4/20 15:03 DC  





 


 Fentanyl Citrate


  (Fentanyl 5ml


 Vial)  250 mcg  1X  ONCE  5/8/20 09:15


 5/8/20 09:16 DC 5/8/20 09:30


50 MCG


 


 Flumazenil


  (Romazicon)  0.5 mg  STK-MED ONCE  6/7/20 14:48


 6/7/20 14:48 DC  





 


 Fluoxetine HCl


  (PROzac)  20 mg  QHS  6/4/20 21:00


    8/6/20 21:47


20 MG


 


 Furosemide


  (Lasix)  40 mg  BID92  8/6/20 09:00


 8/6/20 14:01 DC 8/6/20 13:51


40 MG


 


 Haloperidol


 Lactate


  (Haldol Inj)  3 mg  1X  ONCE  5/4/20 14:30


 5/4/20 14:31 DC 5/4/20 14:37


3 MG


 


 Heparin Sodium


  (Porcine)


  (Hep Lock Adult)  500 unit  STK-MED ONCE  4/7/20 09:29


 4/7/20 09:30 DC  





 


 Heparin Sodium


  (Porcine)


  (Heparin Sodium)  5,000 unit  Q12HR  4/27/20 21:00


 5/7/20 09:59 DC 5/6/20 20:57


5,000 UNIT


 


 Heparin Sodium


  (Porcine) 1000


 unit/Sodium


 Chloride  1,001 ml @ 


 1,001 mls/hr  1X  ONCE  6/30/20 06:00


 6/30/20 06:59 DC  





 


 Hydromorphone HCl


  (Dilaudid


 Standard PCA)  12 mg  STK-MED ONCE  5/1/20 15:50


 5/12/20 11:24 DC  





 


 Hydromorphone HCl


  (Dilaudid)  1 mg  PRN Q4HRS  PRN  5/4/20 19:00


 5/18/20 17:10 DC 5/18/20 06:25


1 MG


 


 Info


  (CONTRAST GIVEN


 -- Rx MONITORING)  1 each  PRN DAILY  PRN  7/21/20 11:45


 7/23/20 11:44 DC  





 


 Info


  (Icu Electrolyte


 Protocol)  1 ea  CONT PRN  PRN  3/29/20 13:15


     





 


 Info


  (PHARMACY


 MONITORING -- do


 not chart)  1 each  PRN DAILY  PRN  4/24/20 15:45


 5/26/20 14:14 DC  





 


 Info


  (Tpn Per


 Pharmacy)  1 each  PRN DAILY  PRN  3/18/20 12:30


   UNV  





 


 Insulin Human


 Lispro


  (HumaLOG)  0-9 UNITS  Q6HRS  3/16/20 09:30


    7/31/20 12:15


4 UNITS


 


 Insulin Human


 Regular


  (HumuLIN R VIAL)  5 unit  1X  ONCE  3/17/20 22:30


 3/17/20 22:31 DC 3/17/20 22:14


5 UNIT


 


 Iohexol


  (Omnipaque 240


 Mg/ml)  10 ml  1X  ONCE  8/4/20 15:45


 8/4/20 15:46 DC 8/4/20 15:00


10 ML


 


 Iohexol


  (Omnipaque 300


 Mg/ml)  50 ml  1X  ONCE  7/28/20 11:00


 7/28/20 11:01 DC  





 


 Iohexol


  (Omnipaque 350


 Mg/ml)  90 ml  1X  ONCE  3/16/20 03:30


 3/16/20 03:31 DC 3/16/20 03:25


90 ML


 


 Ketorolac


 Tromethamine


  (Toradol 30mg


 Vial)  30 mg  1X  ONCE  3/16/20 03:00


 3/16/20 03:01 DC 3/16/20 02:54


30 MG


 


 Lidocaine HCl


  (Buffered


 Lidocaine 1%)  5 ml  1X  ONCE  8/4/20 15:45


 8/4/20 15:46 DC 8/4/20 15:00


5 ML


 


 Lidocaine HCl


  (Glydo


  (Lidocaine) Jelly)  1 thomas  1X  ONCE  3/20/20 14:30


 3/20/20 14:31 DC 3/20/20 16:38


1 THOMAS


 


 Lidocaine HCl


  (Lidocaine 1%


 20ml Vial)  20 ml  1X  ONCE  6/7/20 15:00


 6/7/20 15:01 DC 6/7/20 15:30


20 ML


 


 Lidocaine HCl


  (Lidocaine Pf 2%


 Vial)  5 ml  STK-MED ONCE  6/30/20 07:44


 6/30/20 07:44 DC  





 


 Lidocaine HCl


  (Xylocaine-Mpf


 1% 2ml Vial)  2 ml  PRN 1X  PRN  4/27/20 07:00


 4/28/20 06:59 DC  





 


 Linezolid/Dextrose  300 ml @ 


 300 mls/hr  Q12HR  5/17/20 09:00


 5/20/20 08:11 DC 5/19/20 21:08


300 MLS/HR


 


 Lorazepam


  (Ativan Inj)  0.25 mg  PRN Q4HRS  PRN  6/3/20 07:30


    8/7/20 11:50


0.25 MG


 


 Magnesium Sulfate  50 ml @ 25


 mls/hr  1X  ONCE  6/30/20 16:30


 6/30/20 18:29 DC 6/30/20 17:02


25 MLS/HR


 


 Meropenem 1 gm/


 Sodium Chloride  100 ml @ 


 200 mls/hr  Q12HR  6/7/20 21:00


 6/25/20 08:56 DC 6/25/20 08:27


200 MLS/HR


 


 Meropenem 500 mg/


 Sodium Chloride  50 ml @ 


 100 mls/hr  Q6HRS  6/28/20 18:00


 7/21/20 08:23 DC 7/21/20 06:15


100 MLS/HR


 


 Methylprednisolone


 Sodium Succinate


  (SOLU-Medrol


 125MG VIAL)  125 mg  1X  ONCE  6/13/20 06:15


 6/13/20 06:16 DC 6/13/20 06:26


125 MG


 


 Metoclopramide HCl


  (Reglan Vial)  10 mg  PRN Q3HRS  PRN  5/9/20 16:45


    5/14/20 04:25


10 MG


 


 Metoprolol


 Tartrate


  (Lopressor Vial)  5 mg  PRN Q6HRS  PRN  6/10/20 09:00


    8/7/20 11:55


5 MG


 


 Metronidazole  100 ml @ 


 100 mls/hr  Q8HRS  4/14/20 10:00


 4/21/20 08:10 DC 4/21/20 06:04


100 MLS/HR


 


 Micafungin Sodium


 100 mg/Dextrose  100 ml @ 


 100 mls/hr  Q24H  6/30/20 08:30


 8/5/20 08:20 DC 8/4/20 09:30


100 MLS/HR


 


 Midazolam HCl


  (Versed)  2 mg  1X  ONCE  6/7/20 15:00


 6/7/20 15:01 DC 6/7/20 15:28


1 MG


 


 Midazolam HCl 100


 mg/Sodium Chloride  100 ml @ 1


 mls/hr  CONT  PRN  6/30/20 14:45


 8/3/20 09:45 DC 7/3/20 18:48


10 MLS/HR


 


 Midazolam HCl 50


 mg/Sodium Chloride  50 ml @ 0


 mls/hr  CONT  PRN  3/23/20 08:15


 3/28/20 15:59 DC 3/26/20 22:39


7 MLS/HR


 


 Morphine Sulfate


  (Morphine


 Sulfate)  1 mg  PRN Q1HR  PRN  6/30/20 14:45


 8/3/20 09:45 DC 7/25/20 18:28


1 MG


 


 Multi-Ingred


 Cream/Lotion/Oil/


 Oint


  (Artificial


 Tears Eye


 Ointment)  1 thomas  PRN Q1HR  PRN  3/25/20 17:30


 6/3/20 14:39 DC 4/13/20 08:19


1 THOMAS


 


 Multivitamins/


 Minerals


 Therapeutic


  (Centrum


 Multivit-Mineral


 Liq)  5 ml  DAILY  8/5/20 09:00


    8/7/20 08:02


5 ML


 


 Naloxone HCl


  (Narcan)  0.4 mg  PRN Q2MIN  PRN  6/30/20 14:45


 8/3/20 09:45 DC  





 


 Norepinephrine


 Bitartrate 8 mg/


 Dextrose  258 ml @ 


 13.332 mls/


 hr  CONT  PRN  6/7/20 06:30


 8/3/20 09:45 DC 7/2/20 09:09


1.6 MLS/HR


 


 Ondansetron HCl


  (Zofran)  4 mg  STK-MED ONCE  6/30/20 13:33


 6/30/20 13:33 DC  





 


 Pantoprazole


 Sodium


  (PROTONIX VIAL


 for IV PUSH)  40 mg  DAILYAC  3/16/20 11:30


    8/7/20 08:01


40 MG


 


 Phenylephrine HCl


  (Ken-Synephrine


 Inj)  10 mg  STK-MED ONCE  6/30/20 13:33


 6/30/20 13:33 DC  





 


 Phenylephrine HCl


  (PHENYLEPHRINE


 in 0.9% NACL PF)  1 mg  STK-MED ONCE  6/30/20 14:44


 6/30/20 14:45 DC  





 


 Piperacillin Sod/


 Tazobactam Sod


 3.375 gm/Sodium


 Chloride  50 ml @ 


 100 mls/hr  Q6HRS  5/27/20 12:00


 6/4/20 07:26 DC 6/4/20 06:10


100 MLS/HR


 


 Piperacillin Sod/


 Tazobactam Sod


 4.5 gm/Sodium


 Chloride  100 ml @ 


 200 mls/hr  1X  ONCE  3/16/20 06:00


 3/16/20 06:29 DC 3/16/20 05:44


200 MLS/HR


 


 Potassium


 Chloride 110 meq/


 Magnesium Sulfate


 20 meq/


 Multivitamins 10


 ml/Chromium/


 Copper/Manganese/


 Seleni/Zn 1 ml/


 Insulin Human


 Regular 15 unit/


 Total Parenteral


 Nutrition/Amino


 Acids/Dextrose/


 Fat Emulsion


 Intravenous  1,800 ml @ 


 75 mls/hr  TPN  CONT  5/24/20 22:00


 5/25/20 21:59 DC 5/24/20 22:48


75 MLS/HR


 


 Potassium


 Chloride 15 meq/


 Bicarbonate


 Dialysis Soln w/


 out KCl  5,007.5 ml


  @ 1,000 mls/


 hr  Q5H1M  3/29/20 20:00


 4/2/20 13:08 DC 4/1/20 18:14


1,000 MLS/HR


 


 Potassium


 Chloride 20 meq/


 Bicarbonate


 Dialysis Soln w/


 out KCl  5,010 ml @ 


 1,000 mls/hr  Q5H1M  3/25/20 16:00


 3/29/20 19:59 DC 3/29/20 14:54


1,000 MLS/HR


 


 Potassium


 Chloride 40 meq/


 Potassium Acetate


 60 meq/Magnesium


 Sulfate 10 meq/


 Multivitamins 10


 ml/Chromium/


 Copper/Manganese/


 Seleni/Zn 1 ml/


 Insulin Human


 Regular 20 unit/


 Total Parenteral


 Nutrition/Amino


 Acids/Dextrose/


 Fat Emulsion


 Intravenous  1,800 ml @ 


 75 mls/hr  TPN  CONT  6/4/20 22:00


 6/5/20 21:59 DC 6/5/20 00:03


75 MLS/HR


 


 Potassium


 Chloride 70 meq/


 Magnesium Sulfate


 20 meq/


 Multivitamins 10


 ml/Chromium/


 Copper/Manganese/


 Seleni/Zn 1 ml/


 Insulin Human


 Regular 15 unit/


 Total Parenteral


 Nutrition/Amino


 Acids/Dextrose/


 Fat Emulsion


 Intravenous  1,800 ml @ 


 75 mls/hr  TPN  CONT  5/29/20 22:00


 5/30/20 21:59 DC 5/29/20 23:13


75 MLS/HR


 


 Potassium


 Chloride 75 meq/


 Magnesium Sulfate


 15 meq/


 Multivitamins 10


 ml/Chromium/


 Copper/Manganese/


 Seleni/Zn 0.5 ml/


 Insulin Human


 Regular 15 unit/


 Total Parenteral


 Nutrition/Amino


 Acids/Dextrose/


 Fat Emulsion


 Intravenous  1,920 ml @ 


 80 mls/hr  TPN  CONT  5/9/20 22:00


 5/10/20 21:59 DC 5/9/20 22:41


80 MLS/HR


 


 Potassium


 Chloride 75 meq/


 Magnesium Sulfate


 15 meq/Calcium


 Gluconate 8 meq/


 Multivitamins 10


 ml/Chromium/


 Copper/Manganese/


 Seleni/Zn 0.5 ml/


 Insulin Human


 Regular 15 unit/


 Total Parenteral


 Nutrition/Amino


 Acids/Dextrose/


 Fat Emulsion


 Intravenous  1,920 ml @ 


 80 mls/hr  TPN  CONT  5/7/20 22:00


 5/8/20 21:59 DC 5/7/20 22:28


80 MLS/HR


 


 Potassium


 Chloride 75 meq/


 Magnesium Sulfate


 15 meq/Calcium


 Gluconate 8 meq/


 Multivitamins 10


 ml/Chromium/


 Copper/Manganese/


 Seleni/Zn 0.5 ml/


 Insulin Human


 Regular 20 unit/


 Total Parenteral


 Nutrition/Amino


 Acids/Dextrose/


 Fat Emulsion


 Intravenous  1,920 ml @ 


 80 mls/hr  TPN  CONT  5/6/20 22:00


 5/7/20 21:59 DC 5/6/20 22:00


80 MLS/HR


 


 Potassium


 Chloride 75 meq/


 Magnesium Sulfate


 15 meq/Calcium


 Gluconate 8 meq/


 Multivitamins 10


 ml/Chromium/


 Copper/Manganese/


 Seleni/Zn 0.5 ml/


 Insulin Human


 Regular 25 unit/


 Total Parenteral


 Nutrition/Amino


 Acids/Dextrose/


 Fat Emulsion


 Intravenous  1,920 ml @ 


 80 mls/hr  TPN  CONT  5/4/20 22:00


 5/5/20 21:59 DC 5/4/20 23:08


80 MLS/HR


 


 Potassium


 Chloride 75 meq/


 Magnesium Sulfate


 20 meq/Calcium


 Gluconate 10 meq/


 Multivitamins 10


 ml/Chromium/


 Copper/Manganese/


 Seleni/Zn 0.5 ml/


 Insulin Human


 Regular 25 unit/


 Total Parenteral


 Nutrition/Amino


 Acids/Dextrose/


 Fat Emulsion


 Intravenous  1,920 ml @ 


 80 mls/hr  TPN  CONT  5/3/20 22:00


 5/4/20 21:59 DC 5/3/20 22:04


80 MLS/HR


 


 Potassium


 Chloride 75 meq/


 Magnesium Sulfate


 20 meq/Calcium


 Gluconate 10 meq/


 Multivitamins 10


 ml/Chromium/


 Copper/Manganese/


 Seleni/Zn 0.5 ml/


 Insulin Human


 Regular 30 unit/


 Total Parenteral


 Nutrition/Amino


 Acids/Dextrose/


 Fat Emulsion


 Intravenous  1,920 ml @ 


 80 mls/hr  TPN  CONT  5/2/20 22:00


 5/3/20 22:00 DC 5/2/20 21:51


80 MLS/HR


 


 Potassium


 Chloride 80 meq/


 Magnesium Sulfate


 20 meq/


 Multivitamins 10


 ml/Chromium/


 Copper/Manganese/


 Seleni/Zn 0.5 ml/


 Insulin Human


 Regular 15 unit/


 Total Parenteral


 Nutrition/Amino


 Acids/Dextrose/


 Fat Emulsion


 Intravenous  1,920 ml @ 


 80 mls/hr  TPN  CONT  5/12/20 22:00


 5/13/20 21:59 DC 5/12/20 21:40


80 MLS/HR


 


 Potassium


 Chloride 80 meq/


 Magnesium Sulfate


 20 meq/


 Multivitamins 10


 ml/Chromium/


 Copper/Manganese/


 Seleni/Zn 1 ml/


 Insulin Human


 Regular 15 unit/


 Total Parenteral


 Nutrition/Amino


 Acids/Dextrose/


 Fat Emulsion


 Intravenous  1,800 ml @ 


 75 mls/hr  TPN  CONT  5/31/20 22:00


 6/1/20 21:59 DC 5/31/20 21:54


75 MLS/HR


 


 Potassium


 Chloride 90 meq/


 Magnesium Sulfate


 20 meq/


 Multivitamins 10


 ml/Chromium/


 Copper/Manganese/


 Seleni/Zn 1 ml/


 Insulin Human


 Regular 15 unit/


 Total Parenteral


 Nutrition/Amino


 Acids/Dextrose/


 Fat Emulsion


 Intravenous  1,800 ml @ 


 75 mls/hr  TPN  CONT  5/20/20 22:00


 5/21/20 21:59 DC 5/20/20 22:28


75 MLS/HR


 


 Potassium


 Chloride 90 meq/


 Magnesium Sulfate


 20 meq/


 Multivitamins 10


 ml/Chromium/


 Copper/Manganese/


 Seleni/Zn 1 ml/


 Insulin Human


 Regular 20 unit/


 Total Parenteral


 Nutrition/Amino


 Acids/Dextrose/


 Fat Emulsion


 Intravenous  1,800 ml @ 


 75 mls/hr  TPN  CONT  6/3/20 22:00


 6/4/20 21:59 DC 6/3/20 23:13


75 MLS/HR


 


 Potassium


 Chloride/Water  100 ml @ 


 100 mls/hr  Q1H  6/17/20 08:00


 6/17/20 09:59 DC 6/17/20 09:12


100 MLS/HR


 


 Potassium


 Phosphate 20 mmol/


 Sodium Chloride  106.6667


 ml @ 


 51.667 m...  1X  ONCE  3/25/20 13:00


 3/25/20 15:03 DC 3/25/20 12:51


51.667 MLS/HR


 


 Potassium Acetate


 30 meq/Magnesium


 Sulfate 14 meq/


 Multivitamins 10


 ml/Chromium/


 Copper/Manganese/


 Seleni/Zn 1 ml/


 Insulin Human


 Regular 15 unit/


 Sodium Chloride


 20 meq/Potassium


 Chloride 30 meq/


 Total Parenteral


 Nutrition/Amino


 Acids/Dextrose/


 Fat Emulsion


 Intravenous  1,920 ml @ 


 80 mls/hr  TPN  CONT  6/23/20 22:00


 6/24/20 21:59 DC 6/23/20 21:46


80 MLS/HR


 


 Potassium Acetate


 30 meq/Magnesium


 Sulfate 20 meq/


 Calcium Gluconate


 10 meq/


 Multivitamins 10


 ml/Chromium/


 Copper/Manganese/


 Seleni/Zn 0.5 ml/


 Insulin Human


 Regular 30 unit/


 Potassium


 Chloride 30 meq/


 Total Parenteral


 Nutrition/Amino


 Acids/Dextrose/


 Fat Emulsion


 Intravenous  1,920 ml @ 


 80 mls/hr  TPN  CONT  5/1/20 22:00


 5/2/20 21:59 DC 5/1/20 22:34


80 MLS/HR


 


 Potassium Acetate


 40 meq/Magnesium


 Sulfate 10 meq/


 Multivitamins 10


 ml/Chromium/


 Copper/Manganese/


 Seleni/Zn 1 ml/


 Insulin Human


 Regular 20 unit/


 Total Parenteral


 Nutrition/Amino


 Acids/Dextrose/


 Fat Emulsion


 Intravenous  1,920 ml @ 


 80 mls/hr  TPN  CONT  6/16/20 22:00


 6/17/20 21:59 DC 6/16/20 21:32


80 MLS/HR


 


 Potassium Acetate


 40 meq/Magnesium


 Sulfate 5 meq/


 Multivitamins 10


 ml/Chromium/


 Copper/Manganese/


 Seleni/Zn 1 ml/


 Insulin Human


 Regular 30 unit/


 Total Parenteral


 Nutrition/Amino


 Acids/Dextrose/


 Fat Emulsion


 Intravenous  1,920 ml @ 


 80 mls/hr  TPN  CONT  6/15/20 22:00


 6/16/20 19:34 DC 6/15/20 21:54


80 MLS/HR


 


 Potassium Acetate


 55 meq/Magnesium


 Sulfate 20 meq/


 Calcium Gluconate


 10 meq/


 Multivitamins 10


 ml/Chromium/


 Copper/Manganese/


 Seleni/Zn 0.5 ml/


 Insulin Human


 Regular 30 unit/


 Total Parenteral


 Nutrition/Amino


 Acids/Dextrose/


 Fat Emulsion


 Intravenous  1,920 ml @ 


 80 mls/hr  TPN  CONT  4/30/20 22:00


 5/1/20 21:59 DC 5/1/20 01:00


80 MLS/HR


 


 Potassium Acetate


 55 meq/Magnesium


 Sulfate 20 meq/


 Calcium Gluconate


 10 meq/


 Multivitamins 10


 ml/Chromium/


 Copper/Manganese/


 Seleni/Zn 0.5 ml/


 Insulin Human


 Regular 35 unit/


 Total Parenteral


 Nutrition/Amino


 Acids/Dextrose/


 Fat Emulsion


 Intravenous  1,920 ml @ 


 80 mls/hr  TPN  CONT  4/28/20 22:00


 4/29/20 21:59 DC 4/28/20 22:02


80 MLS/HR


 


 Potassium Acetate


 60 meq/Magnesium


 Sulfate 10 meq/


 Multivitamins 10


 ml/Chromium/


 Copper/Manganese/


 Seleni/Zn 1 ml/


 Insulin Human


 Regular 20 unit/


 Total Parenteral


 Nutrition/Amino


 Acids/Dextrose/


 Fat Emulsion


 Intravenous  1,920 ml @ 


 80 mls/hr  TPN  CONT  6/17/20 22:00


 6/18/20 21:59 DC 6/17/20 21:55


80 MLS/HR


 


 Potassium Acetate


 60 meq/Magnesium


 Sulfate 14 meq/


 Multivitamins 10


 ml/Chromium/


 Copper/Manganese/


 Seleni/Zn 1 ml/


 Insulin Human


 Regular 15 unit/


 Sodium Chloride


 20 meq/Total


 Parenteral


 Nutrition/Amino


 Acids/Dextrose/


 Fat Emulsion


 Intravenous  1,920 ml @ 


 80 mls/hr  TPN  CONT  6/22/20 22:00


 6/23/20 21:59 DC 6/22/20 21:54


80 MLS/HR


 


 Potassium Acetate


 60 meq/Magnesium


 Sulfate 14 meq/


 Multivitamins 10


 ml/Chromium/


 Copper/Manganese/


 Seleni/Zn 1 ml/


 Insulin Human


 Regular 15 unit/


 Total Parenteral


 Nutrition/Amino


 Acids/Dextrose/


 Fat Emulsion


 Intravenous  1,920 ml @ 


 80 mls/hr  TPN  CONT  6/21/20 22:00


 6/22/20 21:59 DC 6/21/20 22:22


80 MLS/HR


 


 Potassium Acetate


 60 meq/Magnesium


 Sulfate 14 meq/


 Multivitamins 10


 ml/Chromium/


 Copper/Manganese/


 Seleni/Zn 1 ml/


 Insulin Human


 Regular 20 unit/


 Total Parenteral


 Nutrition/Amino


 Acids/Dextrose/


 Fat Emulsion


 Intravenous  1,920 ml @ 


 80 mls/hr  TPN  CONT  6/18/20 22:00


 6/19/20 21:59 DC 6/18/20 22:26


80 MLS/HR


 


 Potassium Acetate


 60 meq/Magnesium


 Sulfate 5 meq/


 Multivitamins 10


 ml/Chromium/


 Copper/Manganese/


 Seleni/Zn 1 ml/


 Insulin Human


 Regular 30 unit/


 Total Parenteral


 Nutrition/Amino


 Acids/Dextrose/


 Fat Emulsion


 Intravenous  1,920 ml @ 


 80 mls/hr  TPN  CONT  6/6/20 22:00


 6/7/20 21:59 DC 6/6/20 21:54


80 MLS/HR


 


 Potassium Acetate


 65 meq/Magnesium


 Sulfate 20 meq/


 Calcium Gluconate


 10 meq/


 Multivitamins 10


 ml/Chromium/


 Copper/Manganese/


 Seleni/Zn 0.5 ml/


 Insulin Human


 Regular 30 unit/


 Total Parenteral


 Nutrition/Amino


 Acids/Dextrose/


 Fat Emulsion


 Intravenous  1,920 ml @ 


 80 mls/hr  TPN  CONT  4/29/20 22:00


 4/30/20 21:59 DC 4/29/20 22:22


80 MLS/HR


 


 Potassium Acetate


 80 meq/Magnesium


 Sulfate 5 meq/


 Multivitamins 10


 ml/Chromium/


 Copper/Manganese/


 Seleni/Zn 1 ml/


 Insulin Human


 Regular 20 unit/


 Total Parenteral


 Nutrition/Amino


 Acids/Dextrose/


 Fat Emulsion


 Intravenous  1,920 ml @ 


 80 mls/hr  TPN  CONT  6/5/20 22:00


 6/6/20 21:59 DC 6/5/20 21:59


80 MLS/HR


 


 Prochlorperazine


 Edisylate


  (Compazine)  5 mg  PACU PRN  PRN  4/27/20 07:00


 4/28/20 06:59 DC  





 


 Propofol


  (Diprivan)  200 mg  STK-MED ONCE  6/30/20 07:44


 6/30/20 07:44 DC  





 


 Ringer's Solution  1,000 ml @ 


 30 mls/hr  Q24H  4/27/20 07:00


 4/27/20 18:59 DC  





 


 Rocuronium Bromide


  (Zemuron)  100 mg  STK-MED ONCE  6/30/20 07:44


 6/30/20 07:44 DC  





 


 Saliva Substitute


  (Biotene


 Moisturizing


 Mouth)  2 spray  PRN Q15MIN  PRN  5/21/20 11:00


     





 


 Sevoflurane


  (Ultane)  60 ml  STK-MED ONCE  4/27/20 12:26


 4/27/20 12:27 DC  





 


 Sodium


 Bicarbonate 150


 meq/Dextrose  1,150 ml @ 


 75 mls/hr  1X  ONCE  6/30/20 16:30


 7/1/20 07:49 DC 6/30/20 20:02


75 MLS/HR


 


 Sodium


 Bicarbonate 50


 meq/Sodium


 Chloride  1,050 ml @ 


 75 mls/hr  Q14H  3/18/20 07:30


 3/23/20 10:28 DC 3/22/20 21:10


75 MLS/HR


 


 Sodium Acetate 50


 meq/Potassium


 Acetate 55 meq/


 Magnesium Sulfate


 20 meq/Calcium


 Gluconate 10 meq/


 Multivitamins 10


 ml/Chromium/


 Copper/Manganese/


 Seleni/Zn 0.5 ml/


 Insulin Human


 Regular 35 unit/


 Total Parenteral


 Nutrition/Amino


 Acids/Dextrose/


 Fat Emulsion


 Intravenous  1,800 ml @ 


 75 mls/hr  TPN  CONT  4/25/20 22:00


 4/26/20 21:59 DC 4/25/20 22:03


75 MLS/HR


 


 Sodium Bicarbonate


  (Sodium Bicarb


 Adult 8.4% Syr)  100 meq  1X  ONCE  6/30/20 16:30


 6/30/20 16:31 DC 6/30/20 17:07


100 MEQ


 


 Sodium Chloride  1,000 ml @ 


 125 mls/hr  1X  ONCE  8/6/20 07:45


 8/6/20 15:44 DC 8/6/20 08:20


125 MLS/HR


 


 Sodium Chloride


  (Normal Saline


 Flush)  3 ml  QSHIFT  PRN  6/30/20 14:45


 8/3/20 09:45 DC  





 


 Sodium Chloride


 80 meq/Potassium


 Chloride 30 meq/


 Potassium Acetate


 30 meq/Magnesium


 Sulfate 14 meq/


 Multivitamins 10


 ml/Chromium/


 Copper/Manganese/


 Seleni/Zn 1 ml/


 Insulin Human


 Regular 15 unit/


 Total Parenteral


 Nutrition/Amino


 Acids/Dextrose/


 Fat Emulsion


 Intravenous  1,920 ml @ 


 80 mls/hr  TPN  CONT  7/1/20 22:00


 7/2/20 21:59 DC 7/1/20 23:05


80 MLS/HR


 


 Sodium Chloride


 90 meq/Calcium


 Gluconate 10 meq/


 Multivitamins 10


 ml/Chromium/


 Copper/Manganese/


 Seleni/Zn 0.5 ml/


 Total Parenteral


 Nutrition/Amino


 Acids/Dextrose/


 Fat Emulsion


 Intravenous  1,512 ml @ 


 63 mls/hr  TPN  CONT  3/18/20 22:00


 3/19/20 21:59 DC 3/18/20 22:06


63 MLS/HR


 


 Sodium Chloride


 90 meq/Calcium


 Gluconate 10 meq/


 Multivitamins 10


 ml/Chromium/


 Copper/Manganese/


 Seleni/Zn 1 ml/


 Total Parenteral


 Nutrition/Amino


 Acids/Dextrose/


 Fat Emulsion


 Intravenous  55.005 ml


  @ 2.292


 mls/hr  TPN  CONT  3/18/20 22:00


 3/18/20 12:33 DC  





 


 Sodium Chloride


 90 meq/Magnesium


 Sulfate 10 meq/


 Calcium Gluconate


 20 meq/


 Multivitamins 10


 ml/Chromium/


 Copper/Manganese/


 Seleni/Zn 0.5 ml/


 Total Parenteral


 Nutrition/Amino


 Acids/Dextrose/


 Fat Emulsion


 Intravenous  1,512 ml @ 


 63 mls/hr  TPN  CONT  3/19/20 22:00


 3/20/20 21:59 DC 3/19/20 22:25


63 MLS/HR


 


 Sodium Chloride


 90 meq/Magnesium


 Sulfate 12 meq/


 Calcium Gluconate


 15 meq/


 Multivitamins 10


 ml/Chromium/


 Copper/Manganese/


 Seleni/Zn 0.5 ml/


 Insulin Human


 Regular 25 unit/


 Total Parenteral


 Nutrition/Amino


 Acids/Dextrose/


 Fat Emulsion


 Intravenous  1,400 ml @ 


 58.333 mls/


 hr  TPN  CONT  4/8/20 22:00


 4/9/20 21:59 DC 4/8/20 21:41


58.333 MLS/HR


 


 Sodium Chloride


 90 meq/Potassium


 Chloride 15 meq/


 Magnesium Sulfate


 12 meq/Calcium


 Gluconate 15 meq/


 Multivitamins 10


 ml/Chromium/


 Copper/Manganese/


 Seleni/Zn 0.5 ml/


 Insulin Human


 Regular 25 unit/


 Total Parenteral


 Nutrition/Amino


 Acids/Dextrose/


 Fat Emulsion


 Intravenous  1,400 ml @ 


 58.333 mls/


 hr  TPN  CONT  4/7/20 22:00


 4/8/20 21:59 DC 4/7/20 22:13


58.333 MLS/HR


 


 Sodium Chloride


 90 meq/Potassium


 Chloride 15 meq/


 Potassium


 Phosphate 10 mmol/


 Magnesium Sulfate


 8 meq/Calcium


 Gluconate 15 meq/


 Multivitamins 10


 ml/Chromium/


 Copper/Manganese/


 Seleni/Zn 0.5 ml/


 Insulin Human


 Regular 25 unit/


 Total Parenteral


 Nutrition/Amino


 Acids/Dextrose/


 Fat Emulsion


 Intravenous  1,400 ml @ 


 58.333 mls/


 hr  TPN  CONT  4/5/20 22:00


 4/6/20 21:59 DC 4/5/20 21:20


58.333 MLS/HR


 


 Sodium Chloride


 90 meq/Potassium


 Chloride 15 meq/


 Potassium


 Phosphate 10 mmol/


 Magnesium Sulfate


 10 meq/Calcium


 Gluconate 20 meq/


 Multivitamins 10


 ml/Chromium/


 Copper/Manganese/


 Seleni/Zn 0.5 ml/


 Total Parenteral


 Nutrition/Amino


 Acids/Dextrose/


 Fat Emulsion


 Intravenous  1,400 ml @ 


 58.333 mls/


 hr  TPN  CONT  3/23/20 22:00


 3/24/20 21:59 DC 3/23/20 21:42


58.333 MLS/HR


 


 Sodium Chloride


 90 meq/Potassium


 Chloride 15 meq/


 Potassium


 Phosphate 10 mmol/


 Magnesium Sulfate


 12 meq/Calcium


 Gluconate 15 meq/


 Multivitamins 10


 ml/Chromium/


 Copper/Manganese/


 Seleni/Zn 0.5 ml/


 Insulin Human


 Regular 25 unit/


 Total Parenteral


 Nutrition/Amino


 Acids/Dextrose/


 Fat Emulsion


 Intravenous  1,400 ml @ 


 58.333 mls/


 hr  TPN  CONT  4/6/20 22:00


 4/7/20 21:59 DC 4/6/20 22:24


58.333 MLS/HR


 


 Sodium Chloride


 90 meq/Potassium


 Chloride 15 meq/


 Potassium


 Phosphate 15 mmol/


 Magnesium Sulfate


 10 meq/Calcium


 Gluconate 15 meq/


 Multivitamins 10


 ml/Chromium/


 Copper/Manganese/


 Seleni/Zn 0.5 ml/


 Total Parenteral


 Nutrition/Amino


 Acids/Dextrose/


 Fat Emulsion


 Intravenous  1,400 ml @ 


 58.333 mls/


 hr  TPN  CONT  3/24/20 22:00


 3/25/20 21:59 DC 3/24/20 22:17


58.333 MLS/HR


 


 Sodium Chloride


 90 meq/Potassium


 Chloride 15 meq/


 Potassium


 Phosphate 15 mmol/


 Magnesium Sulfate


 10 meq/Calcium


 Gluconate 20 meq/


 Multivitamins 10


 ml/Chromium/


 Copper/Manganese/


 Seleni/Zn 0.5 ml/


 Total Parenteral


 Nutrition/Amino


 Acids/Dextrose/


 Fat Emulsion


 Intravenous  1,200 ml @ 


 50 mls/hr  TPN  CONT  3/22/20 22:00


 3/22/20 14:17 DC  





 


 Sodium Chloride


 90 meq/Potassium


 Chloride 15 meq/


 Potassium


 Phosphate 18 mmol/


 Magnesium Sulfate


 8 meq/Calcium


 Gluconate 15 meq/


 Multivitamins 10


 ml/Chromium/


 Copper/Manganese/


 Seleni/Zn 0.5 ml/


 Insulin Human


 Regular 10 unit/


 Total Parenteral


 Nutrition/Amino


 Acids/Dextrose/


 Fat Emulsion


 Intravenous  1,400 ml @ 


 58.333 mls/


 hr  TPN  CONT  3/27/20 22:00


 3/28/20 21:59 DC 3/27/20 21:43


58.333 MLS/HR


 


 Sodium Chloride


 90 meq/Potassium


 Chloride 15 meq/


 Potassium


 Phosphate 18 mmol/


 Magnesium Sulfate


 8 meq/Calcium


 Gluconate 15 meq/


 Multivitamins 10


 ml/Chromium/


 Copper/Manganese/


 Seleni/Zn 0.5 ml/


 Insulin Human


 Regular 15 unit/


 Total Parenteral


 Nutrition/Amino


 Acids/Dextrose/


 Fat Emulsion


 Intravenous  1,400 ml @ 


 58.333 mls/


 hr  TPN  CONT  3/30/20 22:00


 3/31/20 21:59 DC 3/30/20 21:47


58.333 MLS/HR


 


 Sodium Chloride


 90 meq/Potassium


 Chloride 15 meq/


 Potassium


 Phosphate 18 mmol/


 Magnesium Sulfate


 8 meq/Calcium


 Gluconate 15 meq/


 Multivitamins 10


 ml/Chromium/


 Copper/Manganese/


 Seleni/Zn 0.5 ml/


 Insulin Human


 Regular 20 unit/


 Total Parenteral


 Nutrition/Amino


 Acids/Dextrose/


 Fat Emulsion


 Intravenous  1,400 ml @ 


 58.333 mls/


 hr  TPN  CONT  4/2/20 22:00


 4/3/20 21:59 DC 4/2/20 22:45


58.333 MLS/HR


 


 Sodium Chloride


 90 meq/Potassium


 Chloride 15 meq/


 Potassium


 Phosphate 18 mmol/


 Magnesium Sulfate


 8 meq/Calcium


 Gluconate 15 meq/


 Multivitamins 10


 ml/Chromium/


 Copper/Manganese/


 Seleni/Zn 0.5 ml/


 Total Parenteral


 Nutrition/Amino


 Acids/Dextrose/


 Fat Emulsion


 Intravenous  1,400 ml @ 


 58.333 mls/


 hr  TPN  CONT  3/26/20 22:00


 3/27/20 21:59 DC 3/26/20 22:00


58.333 MLS/HR


 


 Sodium Chloride


 90 meq/Potassium


 Chloride 30 meq/


 Potassium Acetate


 30 meq/Magnesium


 Sulfate 15 meq/


 Multivitamins 10


 ml/Chromium/


 Copper/Manganese/


 Seleni/Zn 1 ml/


 Insulin Human


 Regular 15 unit/


 Total Parenteral


 Nutrition/Amino


 Acids/Dextrose/


 Fat Emulsion


 Intravenous  1,680 ml @ 


 70 mls/hr  TPN  CONT  7/16/20 22:00


 7/17/20 21:59 DC 7/16/20 22:06


70 MLS/HR


 


 Sodium Chloride


 90 meq/Potassium


 Phosphate 15 mmol/


 Magnesium Sulfate


 12 meq/Calcium


 Gluconate 15 meq/


 Multivitamins 10


 ml/Chromium/


 Copper/Manganese/


 Seleni/Zn 0.5 ml/


 Insulin Human


 Regular 30 unit/


 Total Parenteral


 Nutrition/Amino


 Acids/Dextrose/


 Fat Emulsion


 Intravenous  1,400 ml @ 


 58.333 mls/


 hr  TPN  CONT  4/10/20 22:00


 4/11/20 21:59 DC 4/10/20 21:49


58.333 MLS/HR


 


 Sodium Chloride


 90 meq/Potassium


 Phosphate 15 mmol/


 Magnesium Sulfate


 12 meq/Calcium


 Gluconate 15 meq/


 Multivitamins 10


 ml/Chromium/


 Copper/Manganese/


 Seleni/Zn 0.5 ml/


 Insulin Human


 Regular 40 unit/


 Total Parenteral


 Nutrition/Amino


 Acids/Dextrose/


 Fat Emulsion


 Intravenous  1,400 ml @ 


 58.333 mls/


 hr  TPN  CONT  4/11/20 22:00


 4/12/20 21:59 DC 4/11/20 21:21


58.333 MLS/HR


 


 Sodium Chloride


 90 meq/Potassium


 Phosphate 19 mmol/


 Magnesium Sulfate


 12 meq/Calcium


 Gluconate 15 meq/


 Multivitamins 10


 ml/Chromium/


 Copper/Manganese/


 Seleni/Zn 0.5 ml/


 Insulin Human


 Regular 40 unit/


 Total Parenteral


 Nutrition/Amino


 Acids/Dextrose/


 Fat Emulsion


 Intravenous  1,400 ml @ 


 58.333 mls/


 hr  TPN  CONT  4/12/20 22:00


 4/13/20 21:59 DC 4/12/20 21:54


58.333 MLS/HR


 


 Sodium Chloride


 90 meq/Potassium


 Phosphate 5 mmol/


 Magnesium Sulfate


 12 meq/Calcium


 Gluconate 15 meq/


 Multivitamins 10


 ml/Chromium/


 Copper/Manganese/


 Seleni/Zn 0.5 ml/


 Insulin Human


 Regular 30 unit/


 Total Parenteral


 Nutrition/Amino


 Acids/Dextrose/


 Fat Emulsion


 Intravenous  1,400 ml @ 


 58.333 mls/


 hr  TPN  CONT  4/9/20 22:00


 4/10/20 21:59 DC 4/9/20 22:08


58.333 MLS/HR


 


 Sodium Chloride


 100 meq/Potassium


 Chloride 30 meq/


 Potassium Acetate


 30 meq/Magnesium


 Sulfate 12 meq/


 Multivitamins 10


 ml/Chromium/


 Copper/Manganese/


 Seleni/Zn 1 ml/


 Insulin Human


 Regular 15 unit/


 Total Parenteral


 Nutrition/Amino


 Acids/Dextrose/


 Fat Emulsion


 Intravenous  1,680 ml @ 


 70 mls/hr  TPN  CONT  7/5/20 22:00


 7/6/20 21:59 DC 7/5/20 21:23


70 MLS/HR


 


 Sodium Chloride


 100 meq/Potassium


 Chloride 40 meq/


 Magnesium Sulfate


 15 meq/Calcium


 Gluconate 15 meq/


 Multivitamins 10


 ml/Chromium/


 Copper/Manganese/


 Seleni/Zn 0.5 ml/


 Insulin Human


 Regular 35 unit/


 Total Parenteral


 Nutrition/Amino


 Acids/Dextrose/


 Fat Emulsion


 Intravenous  1,400 ml @ 


 58.333 mls/


 hr  TPN  CONT  4/19/20 22:00


 4/20/20 21:59 DC 4/19/20 22:46


58.333 MLS/HR


 


 Sodium Chloride


 100 meq/Potassium


 Chloride 40 meq/


 Magnesium Sulfate


 20 meq/Calcium


 Gluconate 10 meq/


 Multivitamins 10


 ml/Chromium/


 Copper/Manganese/


 Seleni/Zn 0.5 ml/


 Insulin Human


 Regular 35 unit/


 Total Parenteral


 Nutrition/Amino


 Acids/Dextrose/


 Fat Emulsion


 Intravenous  1,400 ml @ 


 58.333 mls/


 hr  TPN  CONT  4/23/20 22:00


 4/24/20 21:59 DC 4/24/20 00:06


58.333 MLS/HR


 


 Sodium Chloride


 100 meq/Potassium


 Chloride 40 meq/


 Magnesium Sulfate


 20 meq/Calcium


 Gluconate 15 meq/


 Multivitamins 10


 ml/Chromium/


 Copper/Manganese/


 Seleni/Zn 0.5 ml/


 Insulin Human


 Regular 35 unit/


 Total Parenteral


 Nutrition/Amino


 Acids/Dextrose/


 Fat Emulsion


 Intravenous  1,400 ml @ 


 58.333 mls/


 hr  TPN  CONT  4/22/20 22:00


 4/23/20 21:59 DC 4/22/20 22:27


58.333 MLS/HR


 


 Sodium Chloride


 100 meq/Potassium


 Phosphate 10 mmol/


 Magnesium Sulfate


 12 meq/Calcium


 Gluconate 15 meq/


 Multivitamins 10


 ml/Chromium/


 Copper/Manganese/


 Seleni/Zn 0.5 ml/


 Insulin Human


 Regular 35 unit/


 Potassium


 Chloride 20 meq/


 Total Parenteral


 Nutrition/Amino


 Acids/Dextrose/


 Fat Emulsion


 Intravenous  1,400 ml @ 


 58.333 mls/


 hr  TPN  CONT  4/16/20 22:00


 4/17/20 21:59 DC 4/16/20 22:10


58.333 MLS/HR


 


 Sodium Chloride


 100 meq/Potassium


 Phosphate 19 mmol/


 Magnesium Sulfate


 12 meq/Calcium


 Gluconate 15 meq/


 Multivitamins 10


 ml/Chromium/


 Copper/Manganese/


 Seleni/Zn 0.5 ml/


 Insulin Human


 Regular 40 unit/


 Potassium


 Chloride 20 meq/


 Total Parenteral


 Nutrition/Amino


 Acids/Dextrose/


 Fat Emulsion


 Intravenous  1,400 ml @ 


 58.333 mls/


 hr  TPN  CONT  4/15/20 22:00


 4/16/20 21:59 DC 4/15/20 21:20


58.333 MLS/HR


 


 Sodium Chloride


 100 meq/Potassium


 Phosphate 5 mmol/


 Magnesium Sulfate


 12 meq/Calcium


 Gluconate 15 meq/


 Multivitamins 10


 ml/Chromium/


 Copper/Manganese/


 Seleni/Zn 0.5 ml/


 Insulin Human


 Regular 35 unit/


 Potassium


 Chloride 20 meq/


 Total Parenteral


 Nutrition/Amino


 Acids/Dextrose/


 Fat Emulsion


 Intravenous  1,400 ml @ 


 58.333 mls/


 hr  TPN  CONT  4/17/20 22:00


 4/18/20 21:59 DC 4/17/20 22:59


58.333 MLS/HR


 


 Sodium Chloride


 110 meq/Potassium


 Chloride 30 meq/


 Potassium Acetate


 30 meq/Magnesium


 Sulfate 15 meq/


 Multivitamins 10


 ml/Chromium/


 Copper/Manganese/


 Seleni/Zn 1 ml/


 Insulin Human


 Regular 15 unit/


 Total Parenteral


 Nutrition/Amino


 Acids/Dextrose/


 Fat Emulsion


 Intravenous  1,680 ml @ 


 70 mls/hr  TPN  CONT  7/18/20 22:00


 7/19/20 21:59 DC 7/18/20 22:01


70 MLS/HR


 


 Sodium Chloride


 110 meq/Sodium


 Phosphate 10 mmol/


 Potassium


 Chloride 30 meq/


 Potassium Acetate


 30 meq/Magnesium


 Sulfate 15 meq/


 Multivitamins 10


 ml/Chromium/


 Copper/Manganese/


 Seleni/Zn 1 ml/


 Insulin Human


 Regular 15 unit/


 Total Parenteral


 Nutrition/Amino


 Acids/Dextrose/


 Fat Emulsion


 Intravenous  1,680 ml @ 


 70 mls/hr  TPN  CONT  7/19/20 22:00


 7/20/20 21:59 DC 7/19/20 22:03


70 MLS/HR


 


 Sodium Chloride


 120 meq/Sodium


 Phosphate 10 mmol/


 Potassium


 Chloride 30 meq/


 Potassium Acetate


 30 meq/Magnesium


 Sulfate 15 meq/


 Multivitamins 10


 ml/Chromium/


 Copper/Manganese/


 Seleni/Zn 1 ml/


 Insulin Human


 Regular 15 unit/


 Total Parenteral


 Nutrition/Amino


 Acids/Dextrose/


 Fat Emulsion


 Intravenous  1,680 ml @ 


 70 mls/hr  TPN  CONT  7/26/20 22:00


 7/27/20 21:59 Cancel  





 


 Succinylcholine


 Chloride


  (Anectine)  120 mg  1X  ONCE  3/23/20 08:30


 3/23/20 08:31 DC 3/23/20 08:34


120 MG


 


 Vancomycin HCl


  (Vanco Per


 Pharmacy)  1 each  PRN DAILY  PRN  7/12/20 09:15


 7/15/20 07:41 DC 7/14/20 02:46


1 EACH


 


 Vancomycin HCl


  (Vancomycin


 Random Level)  1 each  1X  ONCE  7/14/20 01:00


 7/14/20 01:01 DC 7/14/20 01:00


1 EACH


 


 Vancomycin HCl


  (Vancomycin


 Trough Level)  1 each  1X  ONCE  7/15/20 09:30


 7/15/20 09:31 Cancel  





 


 Vancomycin HCl


 1.5 gm/Sodium


 Chloride  500 ml @ 


 250 mls/hr  Q12H  7/14/20 10:00


 7/15/20 07:41 DC 7/14/20 22:07


250 MLS/HR


 


 Vancomycin HCl 2


 gm/Sodium Chloride  500 ml @ 


 250 mls/hr  1X  ONCE  7/12/20 10:00


 7/12/20 11:59 DC 7/12/20 10:34


250 MLS/HR


 


 Vasopressin


  (Vasostrict)  20 unit  STK-MED ONCE  6/30/20 12:23


 6/30/20 12:23 DC  





 


 Vasopressin 20


 unit/Dextrose  101 ml @ 


 12 mls/hr  CONT  PRN  6/30/20 15:30


 8/3/20 09:45 DC 7/7/20 04:17


12 MLS/HR


 


 Vecuronium Bromide


  (Norcuron Bolus)  6 mg  PRN Q6HRS  PRN  5/7/20 19:15


 5/7/20 19:35 DC  





 


 Vitamin A/Vitamin


 D


  (Vitamin A & D


 Ointment)  1 thomas  PRN Q1HR  PRN  8/4/20 09:15


    8/5/20 23:33


1 THOMAS


 


 Zoledronic Acid  100 ml @ 


 400 mls/hr  1X  ONCE  8/7/20 12:00


 8/7/20 12:14 DC 8/7/20 13:04


400 MLS/HR








Lab





Laboratory Tests








Test


 8/6/20


18:30 8/7/20


00:27 8/7/20


05:25 8/7/20


05:27


 


Glucose (Fingerstick)


 117 mg/dL


(70-99) 128 mg/dL


(70-99) 


 132 mg/dL


(70-99)


 


Sodium Level


 


 


 142 mmol/L


(136-145) 





 


Potassium Level


 


 


 3.7 mmol/L


(3.5-5.1) 





 


Chloride Level


 


 


 107 mmol/L


() 





 


Carbon Dioxide Level


 


 


 29 mmol/L


(21-32) 





 


Anion Gap   6 (6-14)  


 


Blood Urea Nitrogen


 


 


 10 mg/dL


(7-20) 





 


Creatinine


 


 


 0.7 mg/dL


(0.6-1.0) 





 


Estimated GFR


(Cockcroft-Gault) 


 


 88.9 


 





 


Glucose Level


 


 


 137 mg/dL


(70-99) 





 


Calcium Level


 


 


 10.9 mg/dL


(8.5-10.1) 





 


Phosphorus Level


 


 


 3.4 mg/dL


(2.6-4.7) 





 


Albumin


 


 


 1.8 g/dL


(3.4-5.0) 





 


Test


 8/7/20


12:04 


 


 





 


Glucose (Fingerstick)


 150 mg/dL


(70-99) 


 


 











Results


All relevant outside records, renal labs, imaging studies, telemetry/EKG's were 

reviewed.





Justicifation of Admission Dx:


Justifications for Admission:


Justification of Admission Dx:  Yes











KIMBERLY MYERS MD                 Aug 7, 2020 13:57

## 2020-08-07 NOTE — PDOC
SURGICAL PROGRESS NOTE


DATE: 8/7/20 


TIME: 12:40


Subjective


up in chair


family present


Vital Signs





Vital Signs








  Date Time  Temp Pulse Resp B/P (MAP) Pulse Ox O2 Delivery O2 Flow Rate FiO2


 


8/7/20 11:55  140  155/89    


 


8/7/20 11:00 97.5  22  98 Room Air  





 97.5       








I&O











Intake and Output 


 


 8/7/20





 07:00


 


Intake Total 1759 ml


 


Output Total 3225 ml


 


Balance -1466 ml


 


 


 


Intake Oral 0 ml


 


Tube Feeding 1258 ml


 


Other 501 ml


 


Output Urine Total 1550 ml


 


Drainage Total 1675 ml


 


# Bowel Movements 3








General:  Cooperative, No acute distress


HEENT:  Other (trach)


Heart:  Other (drainage bag in place)


Abdomen:  Soft


Labs





Laboratory Tests








Test


 8/5/20


19:10 8/6/20


01:14 8/6/20


05:40 8/6/20


05:43


 


Glucose (Fingerstick)


 140 mg/dL


(70-99) 136 mg/dL


(70-99) 


 141 mg/dL


(70-99)


 


White Blood Count


 


 


 9.3 x10^3/uL


(4.0-11.0) 





 


Red Blood Count


 


 


 2.90 x10^6/uL


(3.50-5.40) 





 


Hemoglobin


 


 


 8.0 g/dL


(12.0-15.5) 





 


Hematocrit


 


 


 25.1 %


(36.0-47.0) 





 


Mean Corpuscular Volume   86 fL ()  


 


Mean Corpuscular Hemoglobin   28 pg (25-35)  


 


Mean Corpuscular Hemoglobin


Concent 


 


 32 g/dL


(31-37) 





 


Red Cell Distribution Width


 


 


 17.7 %


(11.5-14.5) 





 


Platelet Count


 


 


 646 x10^3/uL


(140-400) 





 


Neutrophils (%) (Auto)   77 % (31-73)  


 


Lymphocytes (%) (Auto)   14 % (24-48)  


 


Monocytes (%) (Auto)   7 % (0-9)  


 


Eosinophils (%) (Auto)   2 % (0-3)  


 


Basophils (%) (Auto)   1 % (0-3)  


 


Neutrophils # (Auto)


 


 


 7.2 x10^3/uL


(1.8-7.7) 





 


Lymphocytes # (Auto)


 


 


 1.3 x10^3/uL


(1.0-4.8) 





 


Monocytes # (Auto)


 


 


 0.6 x10^3/uL


(0.0-1.1) 





 


Eosinophils # (Auto)


 


 


 0.2 x10^3/uL


(0.0-0.7) 





 


Basophils # (Auto)


 


 


 0.0 x10^3/uL


(0.0-0.2) 





 


Sodium Level


 


 


 139 mmol/L


(136-145) 





 


Potassium Level


 


 


 3.7 mmol/L


(3.5-5.1) 





 


Chloride Level


 


 


 107 mmol/L


() 





 


Carbon Dioxide Level


 


 


 29 mmol/L


(21-32) 





 


Anion Gap   3 (6-14)  


 


Blood Urea Nitrogen   9 mg/dL (7-20)  


 


Creatinine


 


 


 0.6 mg/dL


(0.6-1.0) 





 


Estimated GFR


(Cockcroft-Gault) 


 


 106.3 


 





 


Glucose Level


 


 


 144 mg/dL


(70-99) 





 


Calcium Level


 


 


 11.3 mg/dL


(8.5-10.1) 





 


Test


 8/6/20


18:30 8/7/20


00:27 8/7/20


05:25 8/7/20


05:27


 


Glucose (Fingerstick)


 117 mg/dL


(70-99) 128 mg/dL


(70-99) 


 132 mg/dL


(70-99)


 


Sodium Level


 


 


 142 mmol/L


(136-145) 





 


Potassium Level


 


 


 3.7 mmol/L


(3.5-5.1) 





 


Chloride Level


 


 


 107 mmol/L


() 





 


Carbon Dioxide Level


 


 


 29 mmol/L


(21-32) 





 


Anion Gap   6 (6-14)  


 


Blood Urea Nitrogen


 


 


 10 mg/dL


(7-20) 





 


Creatinine


 


 


 0.7 mg/dL


(0.6-1.0) 





 


Estimated GFR


(Cockcroft-Gault) 


 


 88.9 


 





 


Glucose Level


 


 


 137 mg/dL


(70-99) 





 


Calcium Level


 


 


 10.9 mg/dL


(8.5-10.1) 





 


Phosphorus Level


 


 


 3.4 mg/dL


(2.6-4.7) 





 


Albumin


 


 


 1.8 g/dL


(3.4-5.0) 





 


Test


 8/7/20


12:04 


 


 





 


Glucose (Fingerstick)


 150 mg/dL


(70-99) 


 


 











Laboratory Tests








Test


 8/6/20


18:30 8/7/20


00:27 8/7/20


05:25 8/7/20


05:27


 


Glucose (Fingerstick)


 117 mg/dL


(70-99) 128 mg/dL


(70-99) 


 132 mg/dL


(70-99)


 


Sodium Level


 


 


 142 mmol/L


(136-145) 





 


Potassium Level


 


 


 3.7 mmol/L


(3.5-5.1) 





 


Chloride Level


 


 


 107 mmol/L


() 





 


Carbon Dioxide Level


 


 


 29 mmol/L


(21-32) 





 


Anion Gap   6 (6-14)  


 


Blood Urea Nitrogen


 


 


 10 mg/dL


(7-20) 





 


Creatinine


 


 


 0.7 mg/dL


(0.6-1.0) 





 


Estimated GFR


(Cockcroft-Gault) 


 


 88.9 


 





 


Glucose Level


 


 


 137 mg/dL


(70-99) 





 


Calcium Level


 


 


 10.9 mg/dL


(8.5-10.1) 





 


Phosphorus Level


 


 


 3.4 mg/dL


(2.6-4.7) 





 


Albumin


 


 


 1.8 g/dL


(3.4-5.0) 





 


Test


 8/7/20


12:04 


 


 





 


Glucose (Fingerstick)


 150 mg/dL


(70-99) 


 


 











Problem List


Problems


Medical Problems:


(1) Acute pancreatitis


Status: Acute  





(2) Cholelithiasis


Status: Acute  








Assessment/Plan


d/w Dr Castano 


no additional surgery plans


possible trach removal next week





Justicifation of Admission Dx:


Justifications for Admission:


Justification of Admission Dx:  Yes











SAURAV NASH APRN             Aug 7, 2020 12:42

## 2020-08-07 NOTE — PDOC
Infectious Disease Note


Subjective


Subjective


awake, says feeling ok, trach





ROS


ROS


No nausea vomiting diarrhea





Vital Sign


Vital Signs





Vital Signs








  Date Time  Temp Pulse Resp B/P (MAP) Pulse Ox O2 Delivery O2 Flow Rate FiO2


 


8/7/20 10:50     95 Room Air  


 


8/7/20 07:00 96.3 120 22 123/89 (100)    





 96.3       











Physical Exam


PHYSICAL EXAM


GENERAL: pt in bed, appears weak -comfortable -alert


HEENT: Pupils equal, oral cavity dry. OC/Op - Dry


NECK:  Tracheostomy - no JVD


LUNGS: Diminished aeration bases,


HEART:  S1, S2, 


ABDOMEN: Less Distended mild- bowel sounds hypoactive, soft, richardson x 2, ROBERT 

drains, G-J tube. Some drainage around R quad tube again and mild insertion site

irritation


: [place] in place 


EXTREMITIES: Trace generalized edema, no cyanosis. Yeast in groin area


SKIN: warm touch. No signs of rash.  


LUE- Previous PICC site without signs of complications. PIV s clean


NEURO: - Alert and responsive


PICC RUE - clean


EC fistula





Labs


Lab





Laboratory Tests








Test


 8/6/20


18:30 8/7/20


00:27 8/7/20


05:25 8/7/20


05:27


 


Glucose (Fingerstick)


 117 mg/dL


(70-99) 128 mg/dL


(70-99) 


 132 mg/dL


(70-99)


 


Sodium Level


 


 


 142 mmol/L


(136-145) 





 


Potassium Level


 


 


 3.7 mmol/L


(3.5-5.1) 





 


Chloride Level


 


 


 107 mmol/L


() 





 


Carbon Dioxide Level


 


 


 29 mmol/L


(21-32) 





 


Anion Gap   6 (6-14)  


 


Blood Urea Nitrogen


 


 


 10 mg/dL


(7-20) 





 


Creatinine


 


 


 0.7 mg/dL


(0.6-1.0) 





 


Estimated GFR


(Cockcroft-Gault) 


 


 88.9 


 





 


Glucose Level


 


 


 137 mg/dL


(70-99) 





 


Calcium Level


 


 


 10.9 mg/dL


(8.5-10.1) 





 


Phosphorus Level


 


 


 3.4 mg/dL


(2.6-4.7) 





 


Albumin


 


 


 1.8 g/dL


(3.4-5.0) 











Micro





BC neg





Objective


Assessment


Patient with prolonged hospitalization more than 4 months


Multiple medical problems


Multiple surgical procedures





URINE with parapsilosis


S/P Exp. 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 

parapsilosis & PSAE (I-merrem/ceftazidime/AZT/cefepime))


Leukocytosis - better


Loose stool but on tube feed - WBC down and no gross fever


Fever - 7/25 c-diff neg


Anemia


Acute gallstone pancreatitis with persistent necrosis


  - 4/9.  CT A/P Increased ascites. Persistent evidence of necrotizing 

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


  - 4/27. status post ROBERT drain placement; C. parapsilosis. s/p drain 5/6 + yeast

& high amylase; s/p additional drain on 5/8. Drains removed. 


  -5/6. fluid  candida parapsilosis fluid, amylase high


  - 6/6 showed multiple pseudocysts, slight larger on the right. s/p drains x 3,

6/7.  + PSAE (MDRO-R Cefepime, Zosyn ANSON < 64) and yeast, 


  -6/7 s/p drain replacement x 3; fluid cult PSAE (MDRO), yeast; treated


  -7/12 CT A/P shows smaller fluid collections.  


  -722 CT abdomen and pelvis drains in place       


Ascites s/p paracentesis 4/15 & 5/6. C. parapsilosis 


Cholelithiasis with thickening of the gallbladder wall.


JED, Hyperkalemia, Metabolic acidosis off dialysis


Acute hypoxic resp failure. trach/vent. sputum 6/13  + PSAE (I merrem) ; sputum 

culture July 19+ for PSAE R Merrem, sensitive to cefepime


Pleural effusion status post CTS left side


 Abdominal fluid culture 6 /7 MDRO Pseudomonas, yeast


Sputum culture positive 7/19 for MDRO Pseudomonas


Chest tube fluid positive for 7/21 Candida Parapsilosis


EC fistula





Plan


Plan of Care








[place] changed 7/27


off antibiotics


Nystatin to groin


UA and urine culture Blood culture/Cath tip neg - neg


C. difficile negative


Monitor WBC/temp 


Wound care /drain management as directed


Contact isolation for CRE/MDRO








Long term prognosis  poor





D/w nursing











LINN FRANZ MD                Aug 7, 2020 11:45

## 2020-08-07 NOTE — PDOC
[employer] PROGRESS NOTE


Date of Service


DOS:


DATE: 8/7/20 


TIME: 11:22





Chief Complaint


Chief Complaint


A/P:


S/P Exp. 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 

parapsilosis & PSAE (I-merrem/ceftazidime/AZT/cefepime))


Leucocytosis -stable


[id number] last night


Acute gallstone pancreatitis with persistent necrosis


Acute hypoxic Respiratory failure required mechanical ventilation


Tracheostomy Wednesday.


bilateral pleural effusions/pulm edema s/p Throacentesis on 6/15/2020


Severe Acute gallstone pancreatitis (not a surgical candidate at this time) with

necrosis


Acute kidney failure now requiring dialysis


Gallstones (Calculus of gallbladder with acute cholecystitis without 

obstruction)


HTN 


Intractable pain


Intractable nausea


Covid 19 negative. 


Acute on chronic anemia 


EEG: No seizure activity


Fever  - intermittent 


? Ileus with vomiting


Abd distention - U/S and CT reviewed s/p 0.4 L of opaque, debris-containing 

ascites was removed 5/6


Acute pancreatitis with persistent necrosis


Gallstone pancreatitis with necrosis. 


   -CT A/P 6/6 showed multiple pseudocysts, slight larger on the right. s/p 

drains x 3, 6/7.  + PSAE (MDRO-R Cefepime, Zosyn ANSON < 64) and yeast, 


   -s/p drain 4/27. C. parapsilosis. s/p drain 5/6 + yeast & high amylase; s/p 

additional drain on 5/8. Drains removed. 


Ascites s/p paracentesis 4/15 & 5/6. C. parapsilosis 


JED. off HD. 


A large fluid collection in the pancreatic bed has slightly decreased in size, 

described below, the pancreas itself is difficult to  visualize, which could be 

due to necrosis or obscuration of pancreatic  parenchyma from the surrounding 

fluid collection.6/15 


- 4/27 status post ROBERT drain placement + C paropsilosis. s/p additional drains 

5/8


Anemia - S/p PRBCs. 


Cholelithiasis with thickening of the gallbladder wall.


Leucocytosis improving


JED, hyperkalemia, Metabolic acidosis off dialysis


hypocalcemia 


Prediabetes


HTN


s/p trach


Hyperglycemia


severe protein-caloric malnutrition


Moderate to large left pleural effusion with atelectasis and collapse  of most 

of the left lower lobe, stable


Extensive retroperitoneal fluid collections persist. Percutaneous  drains remain

within the collections in both paracolic gutters. These  communicate with 

additional pelvic and peripancreatic collections.





History of Present Illness


History of Present Illness


Afebrile, weak, having more secretions not wearing a speaking valve today.  Had 

a rash after calcitonin injection.  Discussed with nephrology with her calcium 

moving from 11.3-10.9 will give IV bisphosphonate today, zoledronic acid.





8/6: Hb stable. Afebrile. Weak. Calcium increased. Shortness of breath is 

improving. Plan for calcitonin today, lasix, and saline


8/5: Hemoglobin abnormally low on repeat has been stable 7.2.  Calcium up to 

10.9.  She is able to work with PT, she is asking to eat.  Social work is been 

having difficulties with both of her daughters completing the financial aspect 

of disability paperwork which is been prolonging her stay over the past 5 

months.  Plan to check PTH and to treat hypercalcemia with 1 L normal saline 40 

mg of IV Lasix and repeat labs in a.m.





8/4: Not wishing to wear her speaking valve. J tube having some difficulties. 

Afebrile. Rolf bedside to aid her. transferred from ICU today


8/3: No overnight events. Calcium 10.7 on AM labs. She is still a bit slow to to

respond, but follows commands well. Does not want speaking valve with me. Pain 

is better controlled in her abdomen. Wound care to see today. Will check liver 

function and phos levels given her calcium elevation.





8/2/2020


Patient seen and examined bedside.  Positive fluid balance in the past 24 hours.

 Patient's chart, labs, images were reviewed and discussed with RN





8/1/2020


No acute events overnight.  Seen and examined at bedside.  Patient had a fever 

100.2.  Negative fluid balance of 150 cc.  Patient's chart, labs, images were 

reviewed and discussed with RN





7/31/2020


Patient seen and examined at bedside.  No acute events overnight.  Positive 

fluid balance 633.  Patient's chart, labs, images were reviewed and discussed 

with RN








7/30/2020


Patient seen and examined bedside.  No acute events overnight.  Patient's chart,

labs, images were reviewed and discussed with RN








7/28/2020


Patient seen and examined bedside.  Patient appears to be in no obvious pain.  

All drains have been adequately draining.  Patient continues to be on TPN.  

Chart labs and imaging was reviewed.  Negative fluid balance of 270 cc


7/27/2020


Patient seen and examined in the ICU.  Patient still requires extensive care.  

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  Chart, labs, imaging was 

reviewed.  UOP with + 500cc balance.








7/25 /2020





Patient seen in and examined in the ICU


She is still extremely critically ill


Appears weak, frail, and pale


Better color today with improved eye contact and expression


Discussed with RN


Chart reviewed











 








7/21/2020


Patient seen and examined in the ICU


She is still extremely critically ill


Appears extremely weak frail and pale


Discussed with RN


Chart reviewed





Vitals/I&O


Vitals/I&O:





                                   Vital Signs








  Date Time  Temp Pulse Resp B/P (MAP) Pulse Ox O2 Delivery O2 Flow Rate FiO2


 


8/7/20 10:50     95 Room Air  


 


8/7/20 07:00 96.3 120 22 123/89 (100)    





 96.3       














                                    I & O   


 


 8/6/20 8/6/20 8/7/20





 15:00 23:00 07:00


 


Intake Total 100 ml 100 ml 1559 ml


 


Output Total 1250 ml 925 ml 1050 ml


 


Balance -1150 ml -825 ml 509 ml











Physical Exam


Physical Exam:


GENERAL: pt in bed, appears weak -comfortable -alert


HEENT: Pupils equal, oral cavity dry. OC/Op - Dry


NECK:  Tracheostomy - no JVD


LUNGS: Diminished aeration bases,


HEART:  S1, S2, 


ABDOMEN: Less Distended mild- bowel sounds hypoactive, soft, richardson x 2, ROBERT 

drains, G-J tube. Some drainage around R quad tube again and mild insertion site

irritation


: [place] in place 


EXTREMITIES: Trace generalized edema, no cyanosis. Yeast in groin area


SKIN: warm touch. No signs of rash.  


LUE- Previous PICC site without signs of complications. PIV s clean


NEURO: - Alert and responsive


PICC RUE - clean


EC fistula


General:  Alert, Oriented X3, Cooperative


Heart:  Other (distant heart sounds, tachycardic)


Lungs:  Clear


Abdomen:  Soft, Other (g/j tube in place)


Extremities:  Other (Diffuse edema)


Skin:  No rashes, No significant lesion





Labs


Labs:





Laboratory Tests








Test


 8/6/20


18:30 8/7/20


00:27 8/7/20


05:25 8/7/20


05:27


 


Glucose (Fingerstick)


 117 mg/dL


(70-99) 128 mg/dL


(70-99) 


 132 mg/dL


(70-99)


 


Sodium Level


 


 


 142 mmol/L


(136-145) 





 


Potassium Level


 


 


 3.7 mmol/L


(3.5-5.1) 





 


Chloride Level


 


 


 107 mmol/L


() 





 


Carbon Dioxide Level


 


 


 29 mmol/L


(21-32) 





 


Anion Gap   6 (6-14)  


 


Blood Urea Nitrogen


 


 


 10 mg/dL


(7-20) 





 


Creatinine


 


 


 0.7 mg/dL


(0.6-1.0) 





 


Estimated GFR


(Cockcroft-Gault) 


 


 88.9 


 





 


Glucose Level


 


 


 137 mg/dL


(70-99) 





 


Calcium Level


 


 


 10.9 mg/dL


(8.5-10.1) 





 


Phosphorus Level


 


 


 3.4 mg/dL


(2.6-4.7) 





 


Albumin


 


 


 1.8 g/dL


(3.4-5.0) 














Assessment and Plan


Assessmemt and Plan


Problems


Medical Problems:


(1) Acute pancreatitis


Status: Acute  





(2) Cholelithiasis


Status: Acute  











Comment


Review of Relevant


I have reviewed the following items josy (where applicable) has been applied.


Medications:





Current Medications








 Medications


  (Trade)  Dose


 Ordered  Sig/Yee


 Route


 PRN Reason  Start Time


 Stop Time Status Last Admin


Dose Admin


 


 Calcitonin [place]


  (Miacalcin)  400 unit  BID


 SQ


   8/6/20 18:00


    8/6/20 18:18














Justicifation of Admission Dx:


Justifications for Admission:


Justification of Admission Dx:  Yes











GAETANO GONCALVES MD         Aug 7, 2020 11:22

## 2020-08-08 VITALS — SYSTOLIC BLOOD PRESSURE: 133 MMHG | DIASTOLIC BLOOD PRESSURE: 81 MMHG

## 2020-08-08 VITALS — SYSTOLIC BLOOD PRESSURE: 121 MMHG | DIASTOLIC BLOOD PRESSURE: 81 MMHG

## 2020-08-08 VITALS — DIASTOLIC BLOOD PRESSURE: 89 MMHG | SYSTOLIC BLOOD PRESSURE: 141 MMHG

## 2020-08-08 VITALS — DIASTOLIC BLOOD PRESSURE: 72 MMHG | SYSTOLIC BLOOD PRESSURE: 120 MMHG

## 2020-08-08 VITALS — DIASTOLIC BLOOD PRESSURE: 79 MMHG | SYSTOLIC BLOOD PRESSURE: 126 MMHG

## 2020-08-08 VITALS — SYSTOLIC BLOOD PRESSURE: 134 MMHG | DIASTOLIC BLOOD PRESSURE: 91 MMHG

## 2020-08-08 LAB
ANION GAP SERPL CALC-SCNC: 7 MMOL/L (ref 6–14)
BASOPHILS # BLD AUTO: 0 X10^3/UL (ref 0–0.2)
BASOPHILS NFR BLD: 0 % (ref 0–3)
BUN SERPL-MCNC: 12 MG/DL (ref 7–20)
CALCIUM SERPL-MCNC: 10.3 MG/DL (ref 8.5–10.1)
CHLORIDE SERPL-SCNC: 108 MMOL/L (ref 98–107)
CO2 SERPL-SCNC: 29 MMOL/L (ref 21–32)
CREAT SERPL-MCNC: 0.5 MG/DL (ref 0.6–1)
EOSINOPHIL NFR BLD: 0.2 X10^3/UL (ref 0–0.7)
EOSINOPHIL NFR BLD: 2 % (ref 0–3)
ERYTHROCYTE [DISTWIDTH] IN BLOOD BY AUTOMATED COUNT: 17.9 % (ref 11.5–14.5)
GFR SERPLBLD BASED ON 1.73 SQ M-ARVRAT: 131.1 ML/MIN
GLUCOSE SERPL-MCNC: 130 MG/DL (ref 70–99)
HCT VFR BLD CALC: 27.3 % (ref 36–47)
HGB BLD-MCNC: 8.8 G/DL (ref 12–15.5)
LYMPHOCYTES # BLD: 1.5 X10^3/UL (ref 1–4.8)
LYMPHOCYTES NFR BLD AUTO: 15 % (ref 24–48)
MCH RBC QN AUTO: 28 PG (ref 25–35)
MCHC RBC AUTO-ENTMCNC: 32 G/DL (ref 31–37)
MCV RBC AUTO: 86 FL (ref 79–100)
MONO #: 0.8 X10^3/UL (ref 0–1.1)
MONOCYTES NFR BLD: 8 % (ref 0–9)
NEUT #: 7.6 X10^3/UL (ref 1.8–7.7)
NEUTROPHILS NFR BLD AUTO: 76 % (ref 31–73)
PLATELET # BLD AUTO: 690 X10^3/UL (ref 140–400)
POTASSIUM SERPL-SCNC: 3.7 MMOL/L (ref 3.5–5.1)
RBC # BLD AUTO: 3.16 X10^6/UL (ref 3.5–5.4)
SODIUM SERPL-SCNC: 144 MMOL/L (ref 136–145)
WBC # BLD AUTO: 10 X10^3/UL (ref 4–11)

## 2020-08-08 RX ADMIN — HYDROCODONE BITARTRATE AND ACETAMINOPHEN PRN TAB: 5; 325 TABLET ORAL at 14:37

## 2020-08-08 RX ADMIN — IPRATROPIUM BROMIDE AND ALBUTEROL SULFATE SCH ML: .5; 3 SOLUTION RESPIRATORY (INHALATION) at 07:49

## 2020-08-08 RX ADMIN — PANTOPRAZOLE SODIUM SCH MG: 40 INJECTION, POWDER, FOR SOLUTION INTRAVENOUS at 08:42

## 2020-08-08 RX ADMIN — IPRATROPIUM BROMIDE AND ALBUTEROL SULFATE SCH ML: .5; 3 SOLUTION RESPIRATORY (INHALATION) at 20:25

## 2020-08-08 RX ADMIN — IPRATROPIUM BROMIDE AND ALBUTEROL SULFATE SCH ML: .5; 3 SOLUTION RESPIRATORY (INHALATION) at 16:28

## 2020-08-08 RX ADMIN — MULTIVITAMIN SCH ML: LIQUID ORAL at 08:42

## 2020-08-08 RX ADMIN — IPRATROPIUM BROMIDE AND ALBUTEROL SULFATE SCH ML: .5; 3 SOLUTION RESPIRATORY (INHALATION) at 12:27

## 2020-08-08 RX ADMIN — INSULIN LISPRO SCH UNITS: 100 INJECTION, SOLUTION INTRAVENOUS; SUBCUTANEOUS at 12:00

## 2020-08-08 RX ADMIN — Medication PRN APP: at 05:26

## 2020-08-08 RX ADMIN — HYDROCODONE BITARTRATE AND ACETAMINOPHEN PRN TAB: 5; 325 TABLET ORAL at 20:54

## 2020-08-08 RX ADMIN — CYCLOBENZAPRINE HYDROCHLORIDE PRN MG: 10 TABLET, FILM COATED ORAL at 20:50

## 2020-08-08 RX ADMIN — ACETYLCYSTEINE SCH MG: 200 INHALANT RESPIRATORY (INHALATION) at 20:26

## 2020-08-08 RX ADMIN — ENOXAPARIN SODIUM SCH MG: 40 INJECTION SUBCUTANEOUS at 08:43

## 2020-08-08 RX ADMIN — ACETYLCYSTEINE SCH MG: 200 INHALANT RESPIRATORY (INHALATION) at 07:51

## 2020-08-08 RX ADMIN — INSULIN LISPRO SCH UNITS: 100 INJECTION, SOLUTION INTRAVENOUS; SUBCUTANEOUS at 18:00

## 2020-08-08 RX ADMIN — METOPROLOL TARTRATE PRN MG: 5 INJECTION INTRAVENOUS at 12:02

## 2020-08-08 RX ADMIN — ONDANSETRON PRN MG: 2 INJECTION INTRAMUSCULAR; INTRAVENOUS at 12:20

## 2020-08-08 RX ADMIN — IPRATROPIUM BROMIDE AND ALBUTEROL SULFATE SCH ML: .5; 3 SOLUTION RESPIRATORY (INHALATION) at 02:09

## 2020-08-08 RX ADMIN — INSULIN LISPRO SCH UNITS: 100 INJECTION, SOLUTION INTRAVENOUS; SUBCUTANEOUS at 05:36

## 2020-08-08 NOTE — PDOC
[employer] PROGRESS NOTE


Date of Service


DOS:


DATE: 8/8/20 


TIME: 12:34





Chief Complaint


Chief Complaint


A/P:


S/P Exp. 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 

parapsilosis & PSAE (I-merrem/ceftazidime/AZT/cefepime))


Leucocytosis -stable


[id number] last night


Acute gallstone pancreatitis with persistent necrosis


Acute hypoxic Respiratory failure required mechanical ventilation


Tracheostomy Wednesday.


bilateral pleural effusions/pulm edema s/p Throacentesis on 6/15/2020


Severe Acute gallstone pancreatitis (not a surgical candidate at this time) with

necrosis


Acute kidney failure now requiring dialysis


Gallstones (Calculus of gallbladder with acute cholecystitis without 

obstruction)


HTN 


Intractable pain


Intractable nausea


Covid 19 negative. 


Acute on chronic anemia 


EEG: No seizure activity


Fever  - intermittent 


? Ileus with vomiting


Abd distention - U/S and CT reviewed s/p 0.4 L of opaque, debris-containing 

ascites was removed 5/6


Acute pancreatitis with persistent necrosis


Gallstone pancreatitis with necrosis. 


   -CT A/P 6/6 showed multiple pseudocysts, slight larger on the right. s/p 

drains x 3, 6/7.  + PSAE (MDRO-R Cefepime, Zosyn ANSON < 64) and yeast, 


   -s/p drain 4/27. C. parapsilosis. s/p drain 5/6 + yeast & high amylase; s/p 

additional drain on 5/8. Drains removed. 


Ascites s/p paracentesis 4/15 & 5/6. C. parapsilosis 


JED. off HD. 


A large fluid collection in the pancreatic bed has slightly decreased in size, 

described below, the pancreas itself is difficult to  visualize, which could be 

due to necrosis or obscuration of pancreatic  parenchyma from the surrounding 

fluid collection.6/15 


- 4/27 status post ROBERT drain placement + C paropsilosis. s/p additional drains 

5/8


Anemia - S/p PRBCs. 


Cholelithiasis with thickening of the gallbladder wall.


Leucocytosis improving


JED, hyperkalemia, Metabolic acidosis off dialysis


hypocalcemia 


Prediabetes


HTN


s/p trach


Hyperglycemia


severe protein-caloric malnutrition


Moderate to large left pleural effusion with atelectasis and collapse  of most 

of the left lower lobe, stable


Extensive retroperitoneal fluid collections persist. Percutaneous  drains remain

within the collections in both paracolic gutters. These  communicate with 

additional pelvic and peripancreatic collections.





History of Present Illness


History of Present Illness


Seen in hallway working with PT. Afebrile, weak, having more secretions not 

wearing a speaking valve today.  WBC 10, Hb 8.8, , and K3.7, BUN 12, CR 

0.5, calcium down to 10.3.  After zoledronic acid





8/7: Had a rash after calcitonin injection.  Discussed with nephrology with her 

calcium moving from 11.3-10.9 will give IV bisphosphonate today, zoledronic 

acid.


8/6: Hb stable. Afebrile. Weak. Calcium increased. Shortness of breath is impr

oving. Plan for calcitonin today, lasix, and saline


8/5: Hemoglobin abnormally low on repeat has been stable 7.2.  Calcium up to 

10.9.  She is able to work with PT, she is asking to eat.  Social work is been 

having difficulties with both of her daughters completing the financial aspect 

of disability paperwork which is been prolonging her stay over the past 5 

months.  Plan to check PTH and to treat hypercalcemia with 1 L normal saline 40 

mg of IV Lasix and repeat labs in a.m.


8/4: Not wishing to wear her speaking valve. J tube having some difficulties. 

Afebrile. Rolf bedside to aid her. transferred from ICU today


8/3: No overnight events. Calcium 10.7 on AM labs. She is still a bit slow to to

respond, but follows commands well. Does not want speaking valve with me. Pain 

is better controlled in her abdomen. Wound care to see today. Will check liver 

function and phos levels given her calcium elevation.


8/2: Patient seen and examined bedside.  Positive fluid balance in the past 24 

hours.  Patient's chart, labs, images were reviewed and discussed with RN


8/1: No acute events overnight.  Seen and examined at bedside.  Patient had a 

fever 100.2.  Negative fluid balance of 150 cc.  Patient's chart, labs, images 

were reviewed and discussed with RN


7/31: Patient seen and examined at bedside.  No acute events overnight.  

Positive fluid balance 633.  Patient's chart, labs, images were reviewed and 

discussed with RN


7/30: Patient seen and examined bedside.  No acute events overnight.  Patient's 

chart, labs, images were reviewed and discussed with RN


7/28: Patient seen and examined bedside.  Patient appears to be in no obvious 

pain.  All drains have been adequately draining.  Patient continues to be on 

TPN.  Chart labs and imaging was reviewed.  Negative fluid balance of 270 cc


7/27:Patient seen and examined in the ICU.  Patient still requires extensive 

care.  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  Chart, labs, imaging was

reviewed.  UOP with + 500cc balance.


7/25: Patient seen in and examined in the ICU. She is still extremely critically

ill. Appears weak, frail, and pale. Better color today with improved eye contact

and expression. Discussed with RN. Chart reviewed


7/21: Patient seen and examined in the ICU, She is still extremely critically 

ill, Appears extremely weak frail and pale, Discussed with RN, Chart reviewed





Vitals/I&O


Vitals/I&O:





                                   Vital Signs








  Date Time  Temp Pulse Resp B/P (MAP) Pulse Ox O2 Delivery O2 Flow Rate FiO2


 


8/8/20 12:02  152  144/89    


 


8/8/20 10:34 97.9  18  97 Room Air  





 97.9       














                                    I & O   


 


 8/7/20 8/7/20 8/8/20





 15:00 23:00 07:00


 


Intake Total 100 ml 1099 ml 1152 ml


 


Output Total 750 ml 875 ml 1100 ml


 


Balance -650 ml 224 ml 52 ml











Physical Exam


Physical Exam:


GENERAL: pt in bed, appears weak -comfortable -alert


HEENT: Pupils equal, oral cavity dry. OC/Op - Dry


NECK:  Tracheostomy - no JVD


LUNGS: Diminished aeration bases,


HEART:  S1, S2, 


ABDOMEN: Less Distended mild- bowel sounds hypoactive, soft, richardson x 2, ROBERT 

drains, G-J tube. Some drainage around R quad tube again and mild insertion site

irritation


: [place] in place 


EXTREMITIES: Trace generalized edema, no cyanosis. Yeast in groin area


SKIN: warm touch. No signs of rash.  


LUE- Previous PICC site without signs of complications. PIV s clean


NEURO: - Alert and responsive


PICC RUE - clean


EC fistula


General:  Cooperative, No acute distress


Heart:  Other


Lungs:  Clear


Abdomen:  Soft


Extremities:  Other (Diffuse edema)


Skin:  No rashes, No significant lesion





Labs


Labs:





Laboratory Tests








Test


 8/7/20


17:45 8/7/20


23:07 8/8/20


04:00 8/8/20


07:29


 


Glucose (Fingerstick)


 136 mg/dL


(70-99) 126 mg/dL


(70-99) 


 142 mg/dL


(70-99)


 


White Blood Count


 


 


 10.0 x10^3/uL


(4.0-11.0) 





 


Red Blood Count


 


 


 3.16 x10^6/uL


(3.50-5.40) 





 


Hemoglobin


 


 


 8.8 g/dL


(12.0-15.5) 





 


Hematocrit


 


 


 27.3 %


(36.0-47.0) 





 


Mean Corpuscular Volume   86 fL ()  


 


Mean Corpuscular Hemoglobin   28 pg (25-35)  


 


Mean Corpuscular Hemoglobin


Concent 


 


 32 g/dL


(31-37) 





 


Red Cell Distribution Width


 


 


 17.9 %


(11.5-14.5) 





 


Platelet Count


 


 


 690 x10^3/uL


(140-400) 





 


Neutrophils (%) (Auto)   76 % (31-73)  


 


Lymphocytes (%) (Auto)   15 % (24-48)  


 


Monocytes (%) (Auto)   8 % (0-9)  


 


Eosinophils (%) (Auto)   2 % (0-3)  


 


Basophils (%) (Auto)   0 % (0-3)  


 


Neutrophils # (Auto)


 


 


 7.6 x10^3/uL


(1.8-7.7) 





 


Lymphocytes # (Auto)


 


 


 1.5 x10^3/uL


(1.0-4.8) 





 


Monocytes # (Auto)


 


 


 0.8 x10^3/uL


(0.0-1.1) 





 


Eosinophils # (Auto)


 


 


 0.2 x10^3/uL


(0.0-0.7) 





 


Basophils # (Auto)


 


 


 0.0 x10^3/uL


(0.0-0.2) 





 


Sodium Level


 


 


 144 mmol/L


(136-145) 





 


Potassium Level


 


 


 3.7 mmol/L


(3.5-5.1) 





 


Chloride Level


 


 


 108 mmol/L


() 





 


Carbon Dioxide Level


 


 


 29 mmol/L


(21-32) 





 


Anion Gap   7 (6-14)  


 


Blood Urea Nitrogen


 


 


 12 mg/dL


(7-20) 





 


Creatinine


 


 


 0.5 mg/dL


(0.6-1.0) 





 


Estimated GFR


(Cockcroft-Gault) 


 


 131.1 


 





 


Glucose Level


 


 


 130 mg/dL


(70-99) 





 


Calcium Level


 


 


 10.3 mg/dL


(8.5-10.1) 





 


Test


 8/8/20


12:23 


 


 





 


Glucose (Fingerstick)


 129 mg/dL


(70-99) 


 


 














Assessment and Plan


Assessmemt and Plan


Problems


Medical Problems:


(1) Acute pancreatitis


Status: Acute  





(2) Cholelithiasis


Status: Acute  











Comment


Review of Relevant


I have reviewed the following items josy (where applicable) has been applied.





Justicifation of Admission Dx:


Justifications for Admission:


Justification of Admission Dx:  Yes











GAETANO GONCALVES MD         Aug 8, 2020 12:39

## 2020-08-09 VITALS — SYSTOLIC BLOOD PRESSURE: 139 MMHG | DIASTOLIC BLOOD PRESSURE: 91 MMHG

## 2020-08-09 VITALS — SYSTOLIC BLOOD PRESSURE: 129 MMHG | DIASTOLIC BLOOD PRESSURE: 84 MMHG

## 2020-08-09 VITALS — DIASTOLIC BLOOD PRESSURE: 91 MMHG | SYSTOLIC BLOOD PRESSURE: 142 MMHG

## 2020-08-09 VITALS — DIASTOLIC BLOOD PRESSURE: 99 MMHG | SYSTOLIC BLOOD PRESSURE: 147 MMHG

## 2020-08-09 VITALS — SYSTOLIC BLOOD PRESSURE: 109 MMHG | DIASTOLIC BLOOD PRESSURE: 67 MMHG

## 2020-08-09 VITALS — DIASTOLIC BLOOD PRESSURE: 93 MMHG | SYSTOLIC BLOOD PRESSURE: 136 MMHG

## 2020-08-09 LAB
ANION GAP SERPL CALC-SCNC: 6 MMOL/L (ref 6–14)
BUN SERPL-MCNC: 21 MG/DL (ref 7–20)
CALCIUM SERPL-MCNC: 10.1 MG/DL (ref 8.5–10.1)
CHLORIDE SERPL-SCNC: 109 MMOL/L (ref 98–107)
CO2 SERPL-SCNC: 28 MMOL/L (ref 21–32)
CREAT SERPL-MCNC: 0.8 MG/DL (ref 0.6–1)
GFR SERPLBLD BASED ON 1.73 SQ M-ARVRAT: 76.2 ML/MIN
GLUCOSE SERPL-MCNC: 160 MG/DL (ref 70–99)
POTASSIUM SERPL-SCNC: 4.7 MMOL/L (ref 3.5–5.1)
SODIUM SERPL-SCNC: 143 MMOL/L (ref 136–145)

## 2020-08-09 RX ADMIN — PROCHLORPERAZINE EDISYLATE PRN MG: 5 INJECTION INTRAMUSCULAR; INTRAVENOUS at 13:28

## 2020-08-09 RX ADMIN — INSULIN LISPRO SCH UNITS: 100 INJECTION, SOLUTION INTRAVENOUS; SUBCUTANEOUS at 12:44

## 2020-08-09 RX ADMIN — INSULIN LISPRO SCH UNITS: 100 INJECTION, SOLUTION INTRAVENOUS; SUBCUTANEOUS at 06:00

## 2020-08-09 RX ADMIN — FENTANYL SCH PATCH: 12.5 PATCH TRANSDERMAL at 09:09

## 2020-08-09 RX ADMIN — METOPROLOL TARTRATE PRN MG: 5 INJECTION INTRAVENOUS at 14:48

## 2020-08-09 RX ADMIN — ONDANSETRON PRN MG: 2 INJECTION INTRAMUSCULAR; INTRAVENOUS at 20:06

## 2020-08-09 RX ADMIN — IPRATROPIUM BROMIDE AND ALBUTEROL SULFATE SCH ML: .5; 3 SOLUTION RESPIRATORY (INHALATION) at 11:57

## 2020-08-09 RX ADMIN — INSULIN LISPRO SCH UNITS: 100 INJECTION, SOLUTION INTRAVENOUS; SUBCUTANEOUS at 00:00

## 2020-08-09 RX ADMIN — FENTANYL CITRATE PRN MCG: 50 INJECTION INTRAMUSCULAR; INTRAVENOUS at 06:00

## 2020-08-09 RX ADMIN — IPRATROPIUM BROMIDE AND ALBUTEROL SULFATE SCH ML: .5; 3 SOLUTION RESPIRATORY (INHALATION) at 00:25

## 2020-08-09 RX ADMIN — ONDANSETRON PRN MG: 2 INJECTION INTRAMUSCULAR; INTRAVENOUS at 12:24

## 2020-08-09 RX ADMIN — IPRATROPIUM BROMIDE AND ALBUTEROL SULFATE SCH ML: .5; 3 SOLUTION RESPIRATORY (INHALATION) at 19:38

## 2020-08-09 RX ADMIN — INSULIN LISPRO SCH UNITS: 100 INJECTION, SOLUTION INTRAVENOUS; SUBCUTANEOUS at 23:24

## 2020-08-09 RX ADMIN — ENOXAPARIN SODIUM SCH MG: 40 INJECTION SUBCUTANEOUS at 09:12

## 2020-08-09 RX ADMIN — MULTIVITAMIN SCH ML: LIQUID ORAL at 09:10

## 2020-08-09 RX ADMIN — IPRATROPIUM BROMIDE AND ALBUTEROL SULFATE SCH ML: .5; 3 SOLUTION RESPIRATORY (INHALATION) at 15:35

## 2020-08-09 RX ADMIN — ACETAMINOPHEN PRN MG: 650 SUPPOSITORY RECTAL at 20:07

## 2020-08-09 RX ADMIN — INSULIN LISPRO SCH UNITS: 100 INJECTION, SOLUTION INTRAVENOUS; SUBCUTANEOUS at 18:06

## 2020-08-09 RX ADMIN — IPRATROPIUM BROMIDE AND ALBUTEROL SULFATE SCH ML: .5; 3 SOLUTION RESPIRATORY (INHALATION) at 04:00

## 2020-08-09 RX ADMIN — ONDANSETRON PRN MG: 2 INJECTION INTRAMUSCULAR; INTRAVENOUS at 23:15

## 2020-08-09 RX ADMIN — ACETYLCYSTEINE SCH MG: 200 INHALANT RESPIRATORY (INHALATION) at 19:38

## 2020-08-09 RX ADMIN — IPRATROPIUM BROMIDE AND ALBUTEROL SULFATE SCH ML: .5; 3 SOLUTION RESPIRATORY (INHALATION) at 07:39

## 2020-08-09 RX ADMIN — PANTOPRAZOLE SODIUM SCH MG: 40 INJECTION, POWDER, FOR SOLUTION INTRAVENOUS at 09:10

## 2020-08-09 RX ADMIN — ACETYLCYSTEINE SCH MG: 200 INHALANT RESPIRATORY (INHALATION) at 07:39

## 2020-08-09 RX ADMIN — METOPROLOL TARTRATE PRN MG: 5 INJECTION INTRAVENOUS at 05:29

## 2020-08-09 NOTE — RAD
CHEST AP ONLY

 

History: Reason: Increased secretions, adventitious breath sounds, / Spl. 

Instructions:  / History: 

 

Comparison: July 20, 2020

 

Findings:

Low lung volumes. Decreased small bilateral layering pleural effusions. 

Decreased interstitial thickening. Interval placement right PICC with tip 

projecting over the cavoatrial junction. Unchanged tracheostomy tube. 

Interval removal of left PICC.

 

Impression: 

1.  Decreased small bilateral layering pleural effusions interstitial 

thickening.

2.  Low lung volumes.

 

Electronically signed by: oTrrey Coyle DO (8/9/2020 2:13 PM) MARTINE

## 2020-08-09 NOTE — PDOC
[facility] PROGRESS NOTE


Date of Service


DOS:


DATE: 8/9/20 


TIME: 08:31





Chief Complaint


Chief Complaint


A/P:


S/P Exp. 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 

parapsilosis & PSAE (I-merrem/ceftazidime/AZT/cefepime))


Leucocytosis -stable


[id number] last night


Acute gallstone pancreatitis with persistent necrosis


Acute hypoxic Respiratory failure required mechanical ventilation


Tracheostomy Wednesday.


bilateral pleural effusions/pulm edema s/p Throacentesis on 6/15/2020


Severe Acute gallstone pancreatitis (not a surgical candidate at this time) with

necrosis


Acute kidney failure now requiring dialysis


Gallstones (Calculus of gallbladder with acute cholecystitis without 

obstruction)


HTN 


Intractable pain


Intractable nausea


Covid 19 negative. 


Acute on chronic anemia 


EEG: No seizure activity


Fever  - intermittent 


? Ileus with vomiting


Abd distention - U/S and CT reviewed s/p 0.4 L of opaque, debris-containing 

ascites was removed 5/6


Acute pancreatitis with persistent necrosis


Gallstone pancreatitis with necrosis. 


   -CT A/P 6/6 showed multiple pseudocysts, slight larger on the right. s/p 

drains x 3, 6/7.  + PSAE (MDRO-R Cefepime, Zosyn ANSON < 64) and yeast, 


   -s/p drain 4/27. C. parapsilosis. s/p drain 5/6 + yeast & high amylase; s/p 

additional drain on 5/8. Drains removed. 


Ascites s/p paracentesis 4/15 & 5/6. C. parapsilosis 


JED. off HD. 


A large fluid collection in the pancreatic bed has slightly decreased in size, 

described below, the pancreas itself is difficult to  visualize, which could be 

due to necrosis or obscuration of pancreatic  parenchyma from the surrounding 

fluid collection.6/15 


- 4/27 status post ROBERT drain placement + C paropsilosis. s/p additional drains 

5/8


Anemia - S/p PRBCs. 


Cholelithiasis with thickening of the gallbladder wall.


Leucocytosis improving


JED, hyperkalemia, Metabolic acidosis off dialysis


hypocalcemia 


Prediabetes


HTN


s/p trach


Hyperglycemia


severe protein-caloric malnutrition


Moderate to large left pleural effusion with atelectasis and collapse  of most 

of the left lower lobe, stable


Extensive retroperitoneal fluid collections persist. Percutaneous  drains remain

within the collections in both paracolic gutters. These  communicate with 

additional pelvic and peripancreatic collections.





History of Present Illness


History of Present Illness


Afebrile.  Calcium down to 10.1.  Little more tachycardic today with some more 

secretions. No O2 requirements. Does not wish to wear her speaking valve. Will 

check CXR.





8/8: Afebrile, weak, having more secretions not wearing a speaking valve today. 

WBC 10, Hb 8.8, , and K3.7, BUN 12, CR 0.5, calcium down to 10.3.  After 

zoledronic acid


8/7: Had a rash after calcitonin injection.  Discussed with nephrology with her 

calcium moving from 11.3-10.9 will give IV bisphosphonate today, zoledronic 

acid.


8/6: Hb stable. Afebrile. Weak. Calcium increased. Shortness of breath is 

improving. Plan for calcitonin today, lasix, and saline


8/5: Hemoglobin abnormally low on repeat has been stable 7.2.  Calcium up to 

10.9.  She is able to work with PT, she is asking to eat.  Social work is been 

having difficulties with both of her daughters completing the financial aspect 

of disability paperwork which is been prolonging her stay over the past 5 

months.  Plan to check PTH and to treat hypercalcemia with 1 L normal saline 40 

mg of IV Lasix and repeat labs in a.m.


8/4: Not wishing to wear her speaking valve. J tube having some difficulties. 

Afebrile. Rolf bedside to aid her. transferred from ICU today


8/3: No overnight events. Calcium 10.7 on AM labs. She is still a bit slow to to

respond, but follows commands well. Does not want speaking valve with me. Pain 

is better controlled in her abdomen. Wound care to see today. Will check liver 

function and phos levels given her calcium elevation.


8/2: Patient seen and examined bedside.  Positive fluid balance in the past 24 

hours.  Patient's chart, labs, images were reviewed and discussed with RN


8/1: No acute events overnight.  Seen and examined at bedside.  Patient had a 

fever 100.2.  Negative fluid balance of 150 cc.  Patient's chart, labs, images 

were reviewed and discussed with RN


7/31: Patient seen and examined at bedside.  No acute events overnight.  

Positive fluid balance 633.  Patient's chart, labs, images were reviewed and 

discussed with RN


7/30: Patient seen and examined bedside.  No acute events overnight.  Patient's 

chart, labs, images were reviewed and discussed with RN


7/28: Patient seen and examined bedside.  Patient appears to be in no obvious 

pain.  All drains have been adequately draining.  Patient continues to be on 

TPN.  Chart labs and imaging was reviewed.  Negative fluid balance of 270 cc


7/27:Patient seen and examined in the ICU.  Patient still requires extensive 

care.  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  Chart, labs, imaging was

reviewed.  UOP with + 500cc balance.


7/25: Patient seen in and examined in the ICU. She is still extremely critically

ill. Appears weak, frail, and pale. Better color today with improved eye contact

and expression. Discussed with RN. Chart reviewed


7/21: Patient seen and examined in the ICU, She is still extremely critically 

ill, Appears extremely weak frail and pale, Discussed with RN, Chart reviewed





Vitals/I&O


Vitals/I&O:





                                   Vital Signs








  Date Time  Temp Pulse Resp B/P (MAP) Pulse Ox O2 Delivery O2 Flow Rate FiO2


 


8/9/20 07:40     96 Room Air  


 


8/9/20 07:00 99.2 136 18 136/93 (107)    





 99.2       














                                    I & O   


 


 8/8/20 8/8/20 8/9/20





 15:00 23:00 07:00


 


Intake Total 526 ml  0 ml


 


Output Total 75 ml 850 ml 150 ml


 


Balance 451 ml -850 ml -150 ml











Physical Exam


Physical Exam:


GENERAL: pt in bed, appears weak -comfortable -alert


HEENT: Pupils equal, oral cavity dry. OC/Op - Dry


NECK:  Tracheostomy - no JVD


LUNGS: Diminished aeration bases,


HEART:  S1, S2, 


ABDOMEN: Less Distended mild- bowel sounds hypoactive, soft, richardson x 2, ROBERT 

drains, G-J tube. Some drainage around R quad tube again and mild insertion site

irritation


: [place] in place 


EXTREMITIES: Trace generalized edema, no cyanosis. Yeast in groin area


SKIN: warm touch. No signs of rash.  


LUE- Previous PICC site without signs of complications. PIV s clean


NEURO: - Alert and responsive


PICC RUE - clean


EC fistula


General:  Cooperative, No acute distress


Heart:  Other


Lungs:  Clear


Abdomen:  Soft


Extremities:  Other (Diffuse edema)


Skin:  No rashes, No significant lesion





Labs


Labs:





Laboratory Tests








Test


 8/8/20


12:23 8/8/20


18:29 8/9/20


02:33 8/9/20


05:20


 


Glucose (Fingerstick)


 129 mg/dL


(70-99) 143 mg/dL


(70-99) 119 mg/dL


(70-99) 





 


Sodium Level


 


 


 


 143 mmol/L


(136-145)


 


Potassium Level


 


 


 


 4.7 mmol/L


(3.5-5.1)


 


Chloride Level


 


 


 


 109 mmol/L


()


 


Carbon Dioxide Level


 


 


 


 28 mmol/L


(21-32)


 


Anion Gap    6 (6-14) 


 


Blood Urea Nitrogen


 


 


 


 21 mg/dL


(7-20)


 


Creatinine


 


 


 


 0.8 mg/dL


(0.6-1.0)


 


Estimated GFR


(Cockcroft-Gault) 


 


 


 76.2 





 


Glucose Level


 


 


 


 160 mg/dL


(70-99)


 


Calcium Level


 


 


 


 10.1 mg/dL


(8.5-10.1)


 


Magnesium Level


 


 


 


 1.9 mg/dL


(1.8-2.4)


 


Test


 8/9/20


07:44 


 


 





 


Glucose (Fingerstick)


 133 mg/dL


(70-99) 


 


 














Assessment and Plan


Assessmemt and Plan


Problems


Medical Problems:


(1) Acute pancreatitis


Status: Acute  





(2) Cholelithiasis


Status: Acute  











Comment


Review of Relevant


I have reviewed the following items josy (where applicable) has been applied.





Justicifation of Admission Dx:


Justifications for Admission:


Justification of Admission Dx:  Yes











GAETANO GONCALVES MD         Aug 9, 2020 08:32

## 2020-08-09 NOTE — NUR
Pt heart rate has been 150s sinus tach since the beginning of my shift. It appears on ecg 
monitor that she has been in the 150s since 10am. Pt is tachypneic with resp rate in 40s. 
She denies pain at this time, slight fever of 99.7f aux. Pt given tylenol.  
notified of this patient with necrotizing pancreatitis with rapid heart rate. Physician 
states as long as it is a sinus rhythm, there is no need to try to correct from a cardiac 
standpoint. We did discuss that the primary physician was aware during day shift and ordered 
a set of blood cultures. A sepsis screen was also positive and it was called to [facility] 
RN. ICU RN was very familiar with this patient that has been hospitalized since March. ICU 
RN states the patient does has a history with tachycardia related to severe anxiety. We 
discussed a lactic could be drawn (it was negative). But to continue to monitor the patient 
and do supportive care unless condition changes.

## 2020-08-10 VITALS — DIASTOLIC BLOOD PRESSURE: 63 MMHG | SYSTOLIC BLOOD PRESSURE: 115 MMHG

## 2020-08-10 VITALS — SYSTOLIC BLOOD PRESSURE: 100 MMHG | DIASTOLIC BLOOD PRESSURE: 67 MMHG

## 2020-08-10 VITALS — DIASTOLIC BLOOD PRESSURE: 62 MMHG | SYSTOLIC BLOOD PRESSURE: 97 MMHG

## 2020-08-10 VITALS — SYSTOLIC BLOOD PRESSURE: 116 MMHG | DIASTOLIC BLOOD PRESSURE: 54 MMHG

## 2020-08-10 VITALS — SYSTOLIC BLOOD PRESSURE: 107 MMHG | DIASTOLIC BLOOD PRESSURE: 64 MMHG

## 2020-08-10 VITALS — DIASTOLIC BLOOD PRESSURE: 56 MMHG | SYSTOLIC BLOOD PRESSURE: 101 MMHG

## 2020-08-10 LAB
ANION GAP SERPL CALC-SCNC: 8 MMOL/L (ref 6–14)
APTT PPP: (no result) S
BACTERIA #/AREA URNS HPF: (no result) /HPF
BASOPHILS # BLD AUTO: 0.1 X10^3/UL (ref 0–0.2)
BASOPHILS NFR BLD: 0 % (ref 0–3)
BILIRUB UR QL STRIP: NEGATIVE
BUN SERPL-MCNC: 35 MG/DL (ref 7–20)
CALCIUM SERPL-MCNC: 9.1 MG/DL (ref 8.5–10.1)
CHLORIDE SERPL-SCNC: 111 MMOL/L (ref 98–107)
CO2 SERPL-SCNC: 26 MMOL/L (ref 21–32)
CREAT SERPL-MCNC: 1.2 MG/DL (ref 0.6–1)
EOSINOPHIL NFR BLD: 0 % (ref 0–3)
EOSINOPHIL NFR BLD: 0 X10^3/UL (ref 0–0.7)
ERYTHROCYTE [DISTWIDTH] IN BLOOD BY AUTOMATED COUNT: 18.9 % (ref 11.5–14.5)
FIBRINOGEN PPP-MCNC: (no result) MG/DL
GFR SERPLBLD BASED ON 1.73 SQ M-ARVRAT: 47.7 ML/MIN
GLUCOSE SERPL-MCNC: 138 MG/DL (ref 70–99)
HCT VFR BLD CALC: 29.2 % (ref 36–47)
HGB BLD-MCNC: 9.3 G/DL (ref 12–15.5)
LYMPHOCYTES # BLD: 2.9 X10^3/UL (ref 1–4.8)
LYMPHOCYTES NFR BLD AUTO: 12 % (ref 24–48)
MCH RBC QN AUTO: 28 PG (ref 25–35)
MCHC RBC AUTO-ENTMCNC: 32 G/DL (ref 31–37)
MCV RBC AUTO: 89 FL (ref 79–100)
MONO #: 1.3 X10^3/UL (ref 0–1.1)
MONOCYTES NFR BLD: 6 % (ref 0–9)
NEUT #: 18.9 X10^3/UL (ref 1.8–7.7)
NEUTROPHILS NFR BLD AUTO: 82 % (ref 31–73)
NITRITE UR QL STRIP: NEGATIVE
PH UR STRIP: 5 [PH]
PLATELET # BLD AUTO: 620 X10^3/UL (ref 140–400)
POTASSIUM SERPL-SCNC: 5.6 MMOL/L (ref 3.5–5.1)
PROT UR STRIP-MCNC: 100 MG/DL
RBC # BLD AUTO: 3.29 X10^6/UL (ref 3.5–5.4)
RBC #/AREA URNS HPF: (no result) /HPF (ref 0–2)
SODIUM SERPL-SCNC: 145 MMOL/L (ref 136–145)
UROBILINOGEN UR-MCNC: 0.2 MG/DL
WBC # BLD AUTO: 23.1 X10^3/UL (ref 4–11)
WBC #/AREA URNS HPF: (no result) /HPF (ref 0–4)
YEAST #/AREA URNS HPF: PRESENT /HPF

## 2020-08-10 RX ADMIN — ACETYLCYSTEINE SCH MG: 200 INHALANT RESPIRATORY (INHALATION) at 19:25

## 2020-08-10 RX ADMIN — INSULIN LISPRO SCH UNITS: 100 INJECTION, SOLUTION INTRAVENOUS; SUBCUTANEOUS at 12:00

## 2020-08-10 RX ADMIN — METOPROLOL TARTRATE PRN MG: 5 INJECTION INTRAVENOUS at 12:36

## 2020-08-10 RX ADMIN — IPRATROPIUM BROMIDE AND ALBUTEROL SULFATE SCH ML: .5; 3 SOLUTION RESPIRATORY (INHALATION) at 08:42

## 2020-08-10 RX ADMIN — INSULIN LISPRO SCH UNITS: 100 INJECTION, SOLUTION INTRAVENOUS; SUBCUTANEOUS at 05:44

## 2020-08-10 RX ADMIN — ACETAMINOPHEN PRN MG: 650 SUPPOSITORY RECTAL at 15:43

## 2020-08-10 RX ADMIN — ACETYLCYSTEINE SCH MG: 200 INHALANT RESPIRATORY (INHALATION) at 08:42

## 2020-08-10 RX ADMIN — IPRATROPIUM BROMIDE AND ALBUTEROL SULFATE SCH ML: .5; 3 SOLUTION RESPIRATORY (INHALATION) at 00:00

## 2020-08-10 RX ADMIN — IPRATROPIUM BROMIDE AND ALBUTEROL SULFATE SCH ML: .5; 3 SOLUTION RESPIRATORY (INHALATION) at 04:00

## 2020-08-10 RX ADMIN — PANTOPRAZOLE SODIUM SCH MG: 40 INJECTION, POWDER, FOR SOLUTION INTRAVENOUS at 08:22

## 2020-08-10 RX ADMIN — HYDROCODONE BITARTRATE AND ACETAMINOPHEN PRN TAB: 5; 325 TABLET ORAL at 03:46

## 2020-08-10 RX ADMIN — CYCLOBENZAPRINE HYDROCHLORIDE PRN MG: 10 TABLET, FILM COATED ORAL at 01:47

## 2020-08-10 RX ADMIN — IPRATROPIUM BROMIDE AND ALBUTEROL SULFATE SCH ML: .5; 3 SOLUTION RESPIRATORY (INHALATION) at 13:11

## 2020-08-10 RX ADMIN — METOPROLOL TARTRATE PRN MG: 5 INJECTION INTRAVENOUS at 01:47

## 2020-08-10 RX ADMIN — PROCHLORPERAZINE EDISYLATE PRN MG: 5 INJECTION INTRAMUSCULAR; INTRAVENOUS at 00:42

## 2020-08-10 RX ADMIN — ENOXAPARIN SODIUM SCH MG: 40 INJECTION SUBCUTANEOUS at 08:23

## 2020-08-10 RX ADMIN — CEFEPIME SCH GM: 2 INJECTION, POWDER, FOR SOLUTION INTRAVENOUS at 12:35

## 2020-08-10 RX ADMIN — IPRATROPIUM BROMIDE AND ALBUTEROL SULFATE SCH ML: .5; 3 SOLUTION RESPIRATORY (INHALATION) at 16:05

## 2020-08-10 RX ADMIN — INSULIN LISPRO SCH UNITS: 100 INJECTION, SOLUTION INTRAVENOUS; SUBCUTANEOUS at 19:01

## 2020-08-10 RX ADMIN — CEFEPIME SCH GM: 2 INJECTION, POWDER, FOR SOLUTION INTRAVENOUS at 20:26

## 2020-08-10 RX ADMIN — HYDROCODONE BITARTRATE AND ACETAMINOPHEN PRN TAB: 5; 325 TABLET ORAL at 01:47

## 2020-08-10 RX ADMIN — MULTIVITAMIN SCH ML: LIQUID ORAL at 08:23

## 2020-08-10 RX ADMIN — IPRATROPIUM BROMIDE AND ALBUTEROL SULFATE SCH ML: .5; 3 SOLUTION RESPIRATORY (INHALATION) at 19:25

## 2020-08-10 RX ADMIN — BACITRACIN SCH MLS/HR: 5000 INJECTION, POWDER, FOR SOLUTION INTRAMUSCULAR at 18:37

## 2020-08-10 NOTE — PDOC
PROGRESS NOTES


Assessment


Problems


Medical Problems:


(1) Acute pancreatitis


Status: Acute  





(2) Cholelithiasis


Status: Acute  





Critical illness neuromyopathy


Single seizure on 3/6, no recurrence.


Metabolic encephalopathy.


Fevers.


Metabolic acidosis.


Diffuse pulmonary infiltrate.


Pleural effusion.


Pancreatitis, necrotizing.


Gallstone.


Leukocytosis.


Lymphopenia.


Electrolytes imbalances.


Hyperglycemia.


DM.


HTN.


HLD.


Anemia.


Abnormal CXR.


Obesity.


Ascites and pleural effusion


Ileus with vomiting


Anemia 


JED


Hyperkalemia


Metabolic acidosis


Hypertension


S/P trache


Anemia


S/P IR drain placement, 6/7


She had exploratory laparotomy, lysis of adhesions, subtotal cholecystectomy 

with cholangiogram, gastrojejunostomy tube placement, pancreatic necrosectomy on

6/30


Plan


Neurology signs off


Keppra if she has further seizures.


Treat medical diseases.


Subjective


None


Objective





Vital Signs








  Date Time  Temp Pulse Resp B/P (MAP) Pulse Ox O2 Delivery O2 Flow Rate FiO2


 


8/10/20 08:42     95 Room Air  


 


8/10/20 07:00 99.2 136 20 107/64 (78)    





 99.2       


 


8/10/20 05:00       2.0 














Intake and Output 


 


 8/10/20





 07:00


 


Intake Total 1042 ml


 


Output Total 2745 ml


 


Balance -1703 ml


 


 


 


Intake Oral 0 ml


 


Tube Feeding 942 ml


 


Other 100 ml


 


Output Urine Total 175 ml


 


Gastric Drainage Total 0 ml


 


Drainage Total 2570 ml


 


# Bowel Movements 5








PHYSICAL EXAM


Sleepy, off vent, trache capped, attempts to verbalize, follows commands


PERRL.


EOMI.


CN: no focal findings.


Muscle tone: normal.


Muscle strength:  2-3/5 strength


DTR: 1+


Plantar reflex: Silent


Gait: not examined in bed.


Sensory exam: nonfocal


Cerebellar:  not testable


Review of Relevant


I have reviewed the following items josy (where applicable) has been applied.


Labs





Laboratory Tests








Test


 8/8/20


12:23 8/8/20


18:29 8/9/20


02:33 8/9/20


05:20


 


Glucose (Fingerstick)


 129 mg/dL


(70-99) 143 mg/dL


(70-99) 119 mg/dL


(70-99) 





 


Sodium Level


 


 


 


 143 mmol/L


(136-145)


 


Potassium Level


 


 


 


 4.7 mmol/L


(3.5-5.1)


 


Chloride Level


 


 


 


 109 mmol/L


()


 


Carbon Dioxide Level


 


 


 


 28 mmol/L


(21-32)


 


Anion Gap    6 (6-14) 


 


Blood Urea Nitrogen


 


 


 


 21 mg/dL


(7-20)


 


Creatinine


 


 


 


 0.8 mg/dL


(0.6-1.0)


 


Estimated GFR


(Cockcroft-Gault) 


 


 


 76.2 





 


Glucose Level


 


 


 


 160 mg/dL


(70-99)


 


Calcium Level


 


 


 


 10.1 mg/dL


(8.5-10.1)


 


Magnesium Level


 


 


 


 1.9 mg/dL


(1.8-2.4)


 


Test


 8/9/20


07:44 8/9/20


12:27 8/9/20


17:17 8/9/20


17:51


 


Glucose (Fingerstick)


 133 mg/dL


(70-99) 163 mg/dL


(70-99) 164 mg/dL


(70-99) 





 


Phosphorus Level


 


 


 


 2.7 mg/dL


(2.6-4.7)


 


Test


 8/9/20


19:50 8/9/20


23:01 8/10/20


03:00 8/10/20


09:45


 


Lactic Acid Level


 < 0.3 mmol/L


(0.4-2.0) 


 


 





 


Glucose (Fingerstick)


 


 166 mg/dL


(70-99) 


 





 


White Blood Count


 


 


 23.1 x10^3/uL


(4.0-11.0) 





 


Red Blood Count


 


 


 3.29 x10^6/uL


(3.50-5.40) 





 


Hemoglobin


 


 


 9.3 g/dL


(12.0-15.5) 





 


Hematocrit


 


 


 29.2 %


(36.0-47.0) 





 


Mean Corpuscular Volume   89 fL ()  


 


Mean Corpuscular Hemoglobin   28 pg (25-35)  


 


Mean Corpuscular Hemoglobin


Concent 


 


 32 g/dL


(31-37) 





 


Red Cell Distribution Width


 


 


 18.9 %


(11.5-14.5) 





 


Platelet Count


 


 


 620 x10^3/uL


(140-400) 





 


Neutrophils (%) (Auto)   82 % (31-73)  


 


Lymphocytes (%) (Auto)   12 % (24-48)  


 


Monocytes (%) (Auto)   6 % (0-9)  


 


Eosinophils (%) (Auto)   0 % (0-3)  


 


Basophils (%) (Auto)   0 % (0-3)  


 


Neutrophils # (Auto)


 


 


 18.9 x10^3/uL


(1.8-7.7) 





 


Lymphocytes # (Auto)


 


 


 2.9 x10^3/uL


(1.0-4.8) 





 


Monocytes # (Auto)


 


 


 1.3 x10^3/uL


(0.0-1.1) 





 


Eosinophils # (Auto)


 


 


 0.0 x10^3/uL


(0.0-0.7) 





 


Basophils # (Auto)


 


 


 0.1 x10^3/uL


(0.0-0.2) 





 


Sodium Level


 


 


 145 mmol/L


(136-145) 





 


Potassium Level


 


 


 5.6 mmol/L


(3.5-5.1) 





 


Chloride Level


 


 


 111 mmol/L


() 





 


Carbon Dioxide Level


 


 


 26 mmol/L


(21-32) 





 


Anion Gap   8 (6-14)  


 


Blood Urea Nitrogen


 


 


 35 mg/dL


(7-20) 





 


Creatinine


 


 


 1.2 mg/dL


(0.6-1.0) 





 


Estimated GFR


(Cockcroft-Gault) 


 


 47.7 


 





 


Glucose Level


 


 


 138 mg/dL


(70-99) 





 


Calcium Level


 


 


 9.1 mg/dL


(8.5-10.1) 





 


Urine Collection Type    Unknown 


 


Urine Color    Jolene 


 


Urine Clarity    Turbid 


 


Urine pH    5.0 (<5.0-8.0) 


 


Urine Specific Gravity


 


 


 


 >=1.030


(1.000-1.030)


 


Urine Protein


 


 


 


 100 mg/dL


(NEG-TRACE)


 


Urine Glucose (UA)


 


 


 


 Negative mg/dL


(NEG)


 


Urine Ketones (Stick)


 


 


 


 Trace mg/dL


(NEG)


 


Urine Blood    Large (NEG) 


 


Urine Nitrite    Negative (NEG) 


 


Urine Bilirubin    Negative (NEG) 


 


Urine Urobilinogen Dipstick


 


 


 


 0.2 mg/dL (0.2


mg/dL)


 


Urine Leukocyte Esterase    Large (NEG) 


 


Urine RBC


 


 


 


 Field obscured


/HPF (0-2)


 


Urine WBC


 


 


 


 Tntc /HPF


(0-4)


 


Urine Bacteria


 


 


 


 Many /HPF


(0-FEW)


 


Urine Yeast    Present /HPF 








Laboratory Tests








Test


 8/9/20


12:27 8/9/20


17:17 8/9/20


17:51 8/9/20


19:50


 


Glucose (Fingerstick)


 163 mg/dL


(70-99) 164 mg/dL


(70-99) 


 





 


Phosphorus Level


 


 


 2.7 mg/dL


(2.6-4.7) 





 


Lactic Acid Level


 


 


 


 < 0.3 mmol/L


(0.4-2.0)


 


Test


 8/9/20


23:01 8/10/20


03:00 8/10/20


09:45 





 


Glucose (Fingerstick)


 166 mg/dL


(70-99) 


 


 





 


White Blood Count


 


 23.1 x10^3/uL


(4.0-11.0) 


 





 


Red Blood Count


 


 3.29 x10^6/uL


(3.50-5.40) 


 





 


Hemoglobin


 


 9.3 g/dL


(12.0-15.5) 


 





 


Hematocrit


 


 29.2 %


(36.0-47.0) 


 





 


Mean Corpuscular Volume  89 fL ()   


 


Mean Corpuscular Hemoglobin  28 pg (25-35)   


 


Mean Corpuscular Hemoglobin


Concent 


 32 g/dL


(31-37) 


 





 


Red Cell Distribution Width


 


 18.9 %


(11.5-14.5) 


 





 


Platelet Count


 


 620 x10^3/uL


(140-400) 


 





 


Neutrophils (%) (Auto)  82 % (31-73)   


 


Lymphocytes (%) (Auto)  12 % (24-48)   


 


Monocytes (%) (Auto)  6 % (0-9)   


 


Eosinophils (%) (Auto)  0 % (0-3)   


 


Basophils (%) (Auto)  0 % (0-3)   


 


Neutrophils # (Auto)


 


 18.9 x10^3/uL


(1.8-7.7) 


 





 


Lymphocytes # (Auto)


 


 2.9 x10^3/uL


(1.0-4.8) 


 





 


Monocytes # (Auto)


 


 1.3 x10^3/uL


(0.0-1.1) 


 





 


Eosinophils # (Auto)


 


 0.0 x10^3/uL


(0.0-0.7) 


 





 


Basophils # (Auto)


 


 0.1 x10^3/uL


(0.0-0.2) 


 





 


Sodium Level


 


 145 mmol/L


(136-145) 


 





 


Potassium Level


 


 5.6 mmol/L


(3.5-5.1) 


 





 


Chloride Level


 


 111 mmol/L


() 


 





 


Carbon Dioxide Level


 


 26 mmol/L


(21-32) 


 





 


Anion Gap  8 (6-14)   


 


Blood Urea Nitrogen


 


 35 mg/dL


(7-20) 


 





 


Creatinine


 


 1.2 mg/dL


(0.6-1.0) 


 





 


Estimated GFR


(Cockcroft-Gault) 


 47.7 


 


 





 


Glucose Level


 


 138 mg/dL


(70-99) 


 





 


Calcium Level


 


 9.1 mg/dL


(8.5-10.1) 


 





 


Urine Collection Type   Unknown  


 


Urine Color   Jolene  


 


Urine Clarity   Turbid  


 


Urine pH   5.0 (<5.0-8.0)  


 


Urine Specific Gravity


 


 


 >=1.030


(1.000-1.030) 





 


Urine Protein


 


 


 100 mg/dL


(NEG-TRACE) 





 


Urine Glucose (UA)


 


 


 Negative mg/dL


(NEG) 





 


Urine Ketones (Stick)


 


 


 Trace mg/dL


(NEG) 





 


Urine Blood   Large (NEG)  


 


Urine Nitrite   Negative (NEG)  


 


Urine Bilirubin   Negative (NEG)  


 


Urine Urobilinogen Dipstick


 


 


 0.2 mg/dL (0.2


mg/dL) 





 


Urine Leukocyte Esterase   Large (NEG)  


 


Urine RBC


 


 


 Field obscured


/HPF (0-2) 





 


Urine WBC


 


 


 Tntc /HPF


(0-4) 





 


Urine Bacteria


 


 


 Many /HPF


(0-FEW) 





 


Urine Yeast   Present /HPF  








Microbiology


7/26/20 Gram Stain - Final, Complete


          


7/26/20 Aerobic Culture - Final, Complete


          


7/26/20 Blood Culture - Final, Complete


          NO GROWTH AFTER 5 DAYS


7/26/20 Urine Culture - Final, Complete


          


7/21/20 Gram Stain - Final, Complete


          


7/21/20 Aerobic and Anaerobic Culture - Final, Complete


          


7/19/20 Gram Stain Evaluation - Final, Complete


          


7/19/20 Respiratory Culture - Final, Complete


          


7/19/20 Antimicrobic Susceptibility - Final, Complete


          


6/30/20 Gram Stain - Final, Complete


          


6/30/20 Aerobic and Anaerobic Culture - Final, Complete


          


6/30/20 Antimicrobic Susceptibility - Final, Complete


Medications





Current Medications


Sodium Chloride 1,000 ml @  1,000 mls/hr Q1H IV  Last administered on 3/16/20at 

03:00;  Start 3/16/20 at 03:00;  Stop 3/16/20 at 03:59;  Status DC


Ondansetron HCl (Zofran) 4 mg 1X  ONCE IVP  Last administered on 3/16/20at 

03:27;  Start 3/16/20 at 03:00;  Stop 3/16/20 at 03:01;  Status DC


Morphine Sulfate (Morphine Sulfate) 4 mg 1X  ONCE IV ;  Start 3/16/20 at 03:00; 

Stop 3/16/20 at 03:01;  Status Cancel


Ketorolac Tromethamine (Toradol 30mg Vial) 30 mg 1X  ONCE IV  Last administered 

on 3/16/20at 02:54;  Start 3/16/20 at 03:00;  Stop 3/16/20 at 03:01;  Status DC


Fentanyl Citrate (Fentanyl 2ml Vial) 25 mcg 1X  ONCE IVP  Last administered on 

3/16/20at 03:23;  Start 3/16/20 at 03:30;  Stop 3/16/20 at 03:31;  Status DC


Fentanyl Citrate (Fentanyl 2ml Vial) 100 mcg STK-MED ONCE .ROUTE ;  Start 

3/16/20 at 03:18;  Stop 3/16/20 at 03:18;  Status DC


Iohexol (Omnipaque 350 Mg/ml) 90 ml 1X  ONCE IV  Last administered on 3/16/20at 

03:25;  Start 3/16/20 at 03:30;  Stop 3/16/20 at 03:31;  Status DC


Info (CONTRAST GIVEN -- Rx MONITORING) 1 each PRN DAILY  PRN MC SEE COMMENTS;  

Start 3/16/20 at 03:30;  Stop 3/18/20 at 03:29;  Status DC


Hydromorphone HCl (Dilaudid) 0.5 mg 1X  ONCE IV  Last administered on 3/16/20at 

03:55;  Start 3/16/20 at 04:30;  Stop 3/16/20 at 04:32;  Status DC


Ondansetron HCl (Zofran) 4 mg PRN Q8HRS  PRN IV NAUSEA/VOMITING 1ST CHOICE;  

Start 3/16/20 at 05:00;  Stop 3/16/20 at 09:27;  Status DC


Morphine Sulfate (Morphine Sulfate) 2 mg PRN Q2HR  PRN IV SEVERE PAIN 7-10 Last 

administered on 3/17/20at 12:26;  Start 3/16/20 at 05:00;  Stop 3/17/20 at 

14:15;  Status DC


Sodium Chloride 1,000 ml @  125 mls/hr Q8H IV  Last administered on 3/16/20at 

20:56;  Start 3/16/20 at 05:00;  Stop 3/17/20 at 04:59;  Status DC


Hydromorphone HCl (Dilaudid) 0.5 mg PRN Q3HRS  PRN IV SEVERE PAIN 7-10 Last 

administered on 3/17/20at 10:06;  Start 3/16/20 at 05:00;  Stop 3/17/20 at 

12:01;  Status DC


Piperacillin Sod/ Tazobactam Sod 4.5 gm/Sodium Chloride 100 ml @  200 mls/hr 1X 

ONCE IV  Last administered on 3/16/20at 05:44;  Start 3/16/20 at 06:00;  Stop 

3/16/20 at 06:29;  Status DC


Ondansetron HCl (Zofran) 4 mg PRN Q4HRS  PRN IV NAUSEA/VOMITING 1ST CHOICE Last 

administered on 8/9/20at 23:15;  Start 3/16/20 at 09:30


Insulin Human Lispro (HumaLOG) 0-9 UNITS Q6HRS SQ  Last administered on 8/9/20at

18:06;  Start 3/16/20 at 09:30


Dextrose (Dextrose 50%-Water Syringe) 12.5 gm PRN Q15MIN  PRN IV SEE COMMENTS;  

Start 3/16/20 at 09:30


Pantoprazole Sodium (PROTONIX VIAL for IV PUSH) 40 mg DAILYAC IVP  Last 

administered on 8/10/20at 08:22;  Start 3/16/20 at 11:30


Prochlorperazine Edisylate (Compazine) 10 mg PRN Q6HRS  PRN IV NAUSEA/VOMITING, 

2nd CHOICE Last administered on 8/10/20at 00:42;  Start 3/16/20 at 17:45


Atenolol (Tenormin) 100 mg DAILY PO ;  Start 3/17/20 at 09:00;  Stop 3/16/20 at 

20:08;  Status DC


Metoprolol Tartrate (Lopressor Vial) 2.5 mg Q6HRS IVP  Last administered on 

3/17/20at 05:51;  Start 3/16/20 at 20:15;  Stop 3/17/20 at 10:02;  Status DC


Metoprolol Tartrate (Lopressor Vial) 5 mg Q6HRS IVP  Last administered on 

3/26/20at 00:12;  Start 3/17/20 at 10:15;  Stop 3/28/20 at 08:48;  Status DC


Hydromorphone HCl (Dilaudid) 1 mg PRN Q3HRS  PRN IV SEVERE PAIN 7-10 Last 

administered on 3/23/20at 05:13;  Start 3/17/20 at 12:00;  Stop 3/31/20 at 

00:25;  Status DC


Lidocaine HCl (Buffered Lidocaine 1%) 3 ml STK-MED ONCE .ROUTE ;  Start 3/17/20 

at 12:55;  Stop 3/17/20 at 12:56;  Status DC


Albumin Human 500 ml @  125 mls/hr 1X  ONCE IV  Last administered on 3/17/20at 

14:33;  Start 3/17/20 at 14:30;  Stop 3/17/20 at 18:32;  Status DC


Norepinephrine Bitartrate 8 mg/ Dextrose 258 ml @  17.299 mls/ hr CONT  PRN IV 

PER PROTOCOL Last administered on 4/14/20at 12:48;  Start 3/17/20 at 15:30;  

Stop 4/17/20 at 09:19;  Status DC


Sodium Chloride 1,000 ml @  125 mls/hr Q8H IV  Last administered on 3/17/20at 

21:04;  Start 3/17/20 at 16:00;  Stop 3/18/20 at 02:42;  Status DC


Albumin Human 500 ml @  125 mls/hr PRN BID  PRN IV After every 2L NSS & BP < 

90mm Last administered on 6/30/20at 16:06;  Start 3/17/20 at 16:00;  Stop 7/3/20

at 09:30;  Status DC


Iohexol (Omnipaque 300 Mg/ml) 60 ml 1X  ONCE IV  Last administered on 3/17/20at 

17:20;  Start 3/17/20 at 17:00;  Stop 3/17/20 at 17:01;  Status DC


Info (CONTRAST GIVEN -- Rx MONITORING) 1 each PRN DAILY  PRN MC SEE COMMENTS;  

Start 3/17/20 at 17:00;  Stop 3/19/20 at 16:59;  Status DC


Meropenem 1 gm/ Sodium Chloride 100 ml @  200 mls/hr Q8HRS IV  Last administered

on 3/18/20at 05:45;  Start 3/17/20 at 20:00;  Stop 3/18/20 at 08:48;  Status DC


Furosemide (Lasix) 40 mg 1X  ONCE IVP  Last administered on 3/17/20at 22:12;  

Start 3/17/20 at 22:30;  Stop 3/17/20 at 22:31;  Status DC


Calcium Chloride 1000 mg/Sodium Chloride 110 ml @  220 mls/hr 1X  ONCE IV  Last 

administered on 3/17/20at 22:11;  Start 3/17/20 at 22:30;  Stop 3/17/20 at 

22:59;  Status DC


Albuterol Sulfate (Ventolin Neb Soln) 2.5 mg 1X  ONCE NEB  Last administered on 

3/18/20at 00:56;  Start 3/17/20 at 22:30;  Stop 3/17/20 at 22:31;  Status DC


Insulin Human Regular (HumuLIN R VIAL) 5 unit 1X  ONCE IV  Last administered on 

3/17/20at 22:14;  Start 3/17/20 at 22:30;  Stop 3/17/20 at 22:31;  Status DC


Magnesium Sulfate 50 ml @ 25 mls/hr 1X  ONCE IV  Last administered on 3/18/20at 

02:57;  Start 3/18/20 at 03:00;  Stop 3/18/20 at 04:59;  Status DC


Calcium Gluconate 1000 mg/Sodium Chloride 110 ml @  220 mls/hr 1X  ONCE IV  Last

administered on 3/18/20at 02:46;  Start 3/18/20 at 03:00;  Stop 3/18/20 at 

03:29;  Status DC


Sodium Chloride 1,000 ml @  200 mls/hr Q5H IV  Last administered on 3/18/20at 

02:46;  Start 3/18/20 at 03:00;  Stop 3/18/20 at 10:21;  Status DC


Calcium Gluconate 1000 mg/Sodium Chloride 110 ml @  220 mls/hr 1X  ONCE IV  Last

administered on 3/18/20at 03:21;  Start 3/18/20 at 03:30;  Stop 3/18/20 at 

03:59;  Status DC


Sodium Bicarbonate 50 meq/Sodium Chloride 1,050 ml @  75 mls/hr Q14H IV  Last 

administered on 3/22/20at 21:10;  Start 3/18/20 at 07:30;  Stop 3/23/20 at 

10:28;  Status DC


Calcium Gluconate 2000 mg/Sodium Chloride 120 ml @  220 mls/hr 1X  ONCE IV  Last

administered on 3/18/20at 09:05;  Start 3/18/20 at 07:30;  Stop 3/18/20 at 

08:02;  Status DC


Lidocaine HCl (Xylocaine-Mpf 1% 2ml Vial) 2 ml STK-MED ONCE .ROUTE ;  Start 3/18

/20 at 08:47;  Stop 3/18/20 at 08:47;  Status DC


Meropenem 500 mg/ Sodium Chloride 50 ml @  100 mls/hr Q12HR IV  Last administer

ed on 3/23/20at 21:01;  Start 3/18/20 at 18:00;  Stop 3/24/20 at 07:58;  Status 

DC


Lidocaine HCl (Buffered Lidocaine 1%) 3 ml STK-MED ONCE .ROUTE ;  Start 3/18/20 

at 09:46;  Stop 3/18/20 at 09:46;  Status DC


Lidocaine HCl (Buffered Lidocaine 1%) 6 ml 1X  ONCE INJ  Last administered on 

3/18/20at 10:26;  Start 3/18/20 at 10:15;  Stop 3/18/20 at 10:16;  Status DC


Info (Tpn Per Pharmacy) 1 each PRN DAILY  PRN MC SEE COMMENTS Last administered 

on 7/26/20at 08:31;  Start 3/18/20 at 12:00;  Stop 7/26/20 at 10:41;  Status DC


Sodium Chloride 1,000 ml @  1,000 mls/hr Q1H PRN IV hypotension;  Start 3/18/20 

at 12:07;  Stop 3/18/20 at 18:06;  Status DC


Diphenhydramine HCl (Benadryl) 25 mg 1X PRN  PRN IV ITCHING;  Start 3/18/20 at 

12:15;  Stop 3/19/20 at 12:14;  Status DC


Diphenhydramine HCl (Benadryl) 25 mg 1X PRN  PRN IV ITCHING;  Start 3/18/20 at 

12:15;  Stop 3/19/20 at 12:14;  Status DC


Sodium Chloride 1,000 ml @  400 mls/hr Q2H30M PRN IV PATENCY;  Start 3/18/20 at 

12:07;  Stop 3/19/20 at 00:06;  Status DC


Info (PHARMACY MONITORING -- do not chart) 1 each PRN DAILY  PRN MC SEE 

COMMENTS;  Start 3/18/20 at 12:15;  Stop 3/20/20 at 08:13;  Status DC


Sodium Chloride 90 meq/Calcium Gluconate 10 meq/ Multivitamins 10 ml/Chromium/ 

Copper/Manganese/ Seleni/Zn 1 ml/ Total Parenteral Nutrition/Amino 

Acids/Dextrose/ Fat Emulsion Intravenous 55.005 ml  @ 2.292 mls/hr TPN  CONT IV 

;  Start 3/18/20 at 22:00;  Stop 3/18/20 at 12:33;  Status DC


Info (Tpn Per Pharmacy) 1 each PRN DAILY  PRN MC SEE COMMENTS;  Start 3/18/20 at

12:30;  Status UNV


Sodium Chloride 90 meq/Calcium Gluconate 10 meq/ Multivitamins 10 ml/Chromium/ 

Copper/Manganese/ Seleni/Zn 0.5 ml/ Total Parenteral Nutrition/Amino 

Acids/Dextrose/ Fat Emulsion Intravenous 1,512 ml @  63 mls/hr TPN  CONT IV  

Last administered on 3/18/20at 22:06;  Start 3/18/20 at 22:00;  Stop 3/19/20 at 

21:59;  Status DC


Calcium Carbonate/ Glycine (Tums) 500 mg PRN AFTMEALHC  PRN PO INDIGESTION;  

Start 3/18/20 at 17:45;  Stop 5/13/20 at 10:25;  Status DC


Calcium Gluconate (Calcium Gluconate) 2,000 mg 1X  ONCE IVP  Last administered 

on 3/19/20at 02:19;  Start 3/19/20 at 02:15;  Stop 3/19/20 at 02:16;  Status DC


Calcium Chloride 3000 mg/Sodium Chloride 1,030 ml @  50 mls/hr [id number] IV  Last 

administered on 3/21/20at 02:17;  Start 3/19/20 at 08:00;  Stop 3/21/20 at 

15:23;  Status DC


Lorazepam (Ativan Inj) 1 mg PRN Q4HRS  PRN IVP ANXIETY / AGITATION, 2nd choic 

Last administered on 4/17/20at 03:51;  Start 3/19/20 at 09:00;  Stop 4/17/20 at 

09:19;  Status DC


Sodium Chloride 1,000 ml @  1,000 mls/hr Q1H PRN IV hypotension;  Start 3/19/20 

at 08:56;  Stop 3/19/20 at 14:55;  Status DC


Albumin Human 200 ml @  200 mls/hr 1X PRN  PRN IV Hypotension;  Start 3/19/20 at

09:00;  Stop 3/19/20 at 14:59;  Status DC


Diphenhydramine HCl (Benadryl) 25 mg 1X PRN  PRN IV ITCHING;  Start 3/19/20 at 

09:00;  Stop 3/20/20 at 08:59;  Status DC


Diphenhydramine HCl (Benadryl) 25 mg 1X PRN  PRN IV ITCHING;  Start 3/19/20 at 

09:00;  Stop 3/20/20 at 08:59;  Status DC


Sodium Chloride 1,000 ml @  400 mls/hr Q2H30M PRN IV PATENCY;  Start 3/19/20 at 

08:56;  Stop 3/19/20 at 20:55;  Status DC


Info (PHARMACY MONITORING -- do not chart) 1 each PRN DAILY  PRN MC SEE COMMENT

S;  Start 3/19/20 at 09:00;  Status UNV


Info (PHARMACY MONITORING -- do not chart) 1 each PRN DAILY  PRN MC SEE 

COMMENTS;  Start 3/19/20 at 09:00;  Stop 3/20/20 at 08:13;  Status DC


Digoxin (Lanoxin) 500 mcg 1X  ONCE IV  Last administered on 3/19/20at 10:04;  

Start 3/19/20 at 10:00;  Stop 3/19/20 at 10:01;  Status DC


Digoxin (Lanoxin) 125 mcg 1X  ONCE IV  Last administered on 3/19/20at 17:10;  

Start 3/19/20 at 18:00;  Stop 3/19/20 at 18:01;  Status DC


Magnesium Sulfate 100 ml @  25 mls/hr 1X  ONCE IV  Last administered on 

3/19/20at 12:48;  Start 3/19/20 at 13:00;  Stop 3/19/20 at 16:59;  Status DC


Sodium Chloride 90 meq/Magnesium Sulfate 10 meq/ Calcium Gluconate 20 meq/ 

Multivitamins 10 ml/Chromium/ Copper/Manganese/ Seleni/Zn 0.5 ml/ Total 

Parenteral Nutrition/Amino Acids/Dextrose/ Fat Emulsion Intravenous 1,512 ml @  

63 mls/hr TPN  CONT IV  Last administered on 3/19/20at 22:25;  Start 3/19/20 at 

22:00;  Stop 3/20/20 at 21:59;  Status DC


Sodium Chloride 1,000 ml @  1,000 mls/hr Q1H PRN IV hypotension;  Start 3/20/20 

at 08:05;  Stop 3/20/20 at 14:04;  Status DC


Albumin Human 200 ml @  200 mls/hr 1X  ONCE IV  Last administered on 3/20/20at 

08:57;  Start 3/20/20 at 08:15;  Stop 3/20/20 at 09:14;  Status DC


Diphenhydramine HCl (Benadryl) 25 mg 1X PRN  PRN IV ITCHING;  Start 3/20/20 at 

08:15;  Stop 3/21/20 at 08:14;  Status DC


Diphenhydramine HCl (Benadryl) 25 mg 1X PRN  PRN IV ITCHING;  Start 3/20/20 at 

08:15;  Stop 3/21/20 at 08:14;  Status DC


Sodium Chloride 1,000 ml @  400 mls/hr Q2H30M PRN IV PATENCY;  Start 3/20/20 at 

08:05;  Stop 3/20/20 at 20:04;  Status DC


Info (PHARMACY MONITORING -- do not chart) 1 each PRN DAILY  PRN MC SEE COM

MENTS;  Start 3/20/20 at 08:15;  Stop 3/24/20 at 07:57;  Status DC


Sodium Chloride 90 meq/Potassium Chloride 15 meq/ Potassium Phosphate 10 mmol/ 

Magnesium Sulfate 10 meq/Calcium Gluconate 20 meq/ Multivitamins 10 ml/Chromium/

Copper/Manganese/ Seleni/Zn 0.5 ml/ Total Parenteral Nutrition/Amino 

Acids/Dextrose/ Fat Emulsion Intravenous 1,512 ml @  63 mls/hr TPN  CONT IV  

Last administered on 3/20/20at 21:01;  Start 3/20/20 at 22:00;  Stop 3/21/20 at 

21:59;  Status DC


Potassium Chloride/Water 100 ml @  100 mls/hr 1X  ONCE IV  Last administered on 

3/20/20at 14:09;  Start 3/20/20 at 14:00;  Stop 3/20/20 at 14:59;  Status DC


Benzocaine (Hurricaine One) 1 spray 1X  ONCE MM  Last administered on 3/20/20at 

16:38;  Start 3/20/20 at 14:30;  Stop 3/20/20 at 14:31;  Status DC


Lidocaine HCl (Glydo (Lidocaine) Jelly) 1 thomas 1X  ONCE MM  Last administered on 

3/20/20at 16:38;  Start 3/20/20 at 14:30;  Stop 3/20/20 at 14:31;  Status DC


Linezolid/Dextrose 300 ml @  300 mls/hr Q12HR IV  Last administered on 3/26/20at

21:04;  Start 3/20/20 at 20:00;  Stop 3/27/20 at 07:50;  Status DC


Acetaminophen (Tylenol) 650 mg PRN Q6HRS  PRN PO MILD PAIN / TEMP;  Start 

3/21/20 at 03:30;  Stop 3/21/20 at 03:36;  Status DC


Acetaminophen (Tylenol) 650 mg PRN Q6HRS  PRN PEG MILD PAIN / TEMP Last a

dministered on 4/16/20at 19:56;  Start 3/21/20 at 03:36;  Stop 5/13/20 at 10:25;

 Status DC


Sodium Chloride 1,000 ml @  1,000 mls/hr Q1H PRN IV hypotension;  Start 3/21/20 

at 07:50;  Stop 3/21/20 at 13:49;  Status DC


Albumin Human 200 ml @  200 mls/hr 1X PRN  PRN IV Hypotension;  Start 3/21/20 at

08:00;  Stop 3/21/20 at 13:59;  Status DC


Sodium Chloride (Normal Saline Flush) 10 ml 1X PRN  PRN IV AP catheter pack;  

Start 3/21/20 at 08:00;  Stop 3/22/20 at 07:59;  Status DC


Sodium Chloride (Normal Saline Flush) 10 ml 1X PRN  PRN IV  catheter pack;  

Start 3/21/20 at 08:00;  Stop 3/22/20 at 07:59;  Status DC


Sodium Chloride 1,000 ml @  400 mls/hr Q2H30M PRN IV PATENCY;  Start 3/21/20 at 

07:50;  Stop 3/21/20 at 19:49;  Status DC


Info (PHARMACY MONITORING -- do not chart) 1 each PRN DAILY  PRN MC SEE 

COMMENTS;  Start 3/21/20 at 08:00;  Status UNV


Info (PHARMACY MONITORING -- do not chart) 1 each PRN DAILY  PRN MC SEE 

COMMENTS;  Start 3/21/20 at 08:00;  Stop 3/23/20 at 08:25;  Status DC


Sodium Chloride 90 meq/Potassium Chloride 15 meq/ Potassium Phosphate 10 mmol/ 

Magnesium Sulfate 10 meq/Calcium Gluconate 20 meq/ Multivitamins 10 ml/Chromium/

Copper/Manganese/ Seleni/Zn 0.5 ml/ Total Parenteral Nutrition/Amino 

Acids/Dextrose/ Fat Emulsion Intravenous 1,512 ml @  63 mls/hr TPN  CONT IV  

Last administered on 3/21/20at 20:57;  Start 3/21/20 at 22:00;  Stop 3/22/20 at 

21:59;  Status DC


Sodium Chloride 90 meq/Potassium Chloride 15 meq/ Potassium Phosphate 15 mmol/ 

Magnesium Sulfate 10 meq/Calcium Gluconate 20 meq/ Multivitamins 10 ml/Chromium/

Copper/Manganese/ Seleni/Zn 0.5 ml/ Total Parenteral Nutrition/Amino 

Acids/Dextrose/ Fat Emulsion Intravenous 1,512 ml @  63 mls/hr TPN  CONT IV ;  

Start 3/22/20 at 22:00;  Stop 3/22/20 at 14:16;  Status DC


Sodium Chloride 90 meq/Potassium Chloride 15 meq/ Potassium Phosphate 15 mmol/ 

Magnesium Sulfate 10 meq/Calcium Gluconate 20 meq/ Multivitamins 10 ml/Chromium/

Copper/Manganese/ Seleni/Zn 0.5 ml/ Total Parenteral Nutrition/Amino 

Acids/Dextrose/ Fat Emulsion Intravenous 1,200 ml @  50 mls/hr TPN  CONT IV ;  

Start 3/22/20 at 22:00;  Stop 3/22/20 at 14:17;  Status DC


Sodium Chloride 90 meq/Potassium Chloride 15 meq/ Potassium Phosphate 10 mmol/ 

Magnesium Sulfate 10 meq/Calcium Gluconate 20 meq/ Multivitamins 10 ml/Chromium/

Copper/Manganese/ Seleni/Zn 0.5 ml/ Total Parenteral Nutrition/Amino 

Acids/Dextrose/ Fat Emulsion Intravenous 1,200 ml @  50 mls/hr TPN  CONT IV  

Last administered on 3/22/20at 23:29;  Start 3/22/20 at 22:00;  Stop 3/23/20 at 

21:59;  Status DC


Sodium Chloride 1,000 ml @  1,000 mls/hr Q1H PRN IV hypotension;  Start 3/23/20 

at 07:28;  Stop 3/23/20 at 13:27;  Status DC


Albumin Human 200 ml @  200 mls/hr 1X  ONCE IV  Last administered on 3/23/20at 

08:51;  Start 3/23/20 at 07:30;  Stop 3/23/20 at 08:29;  Status DC


Diphenhydramine HCl (Benadryl) 25 mg 1X PRN  PRN IV ITCHING;  Start 3/23/20 at 

07:30;  Stop 3/24/20 at 07:29;  Status DC


Diphenhydramine HCl (Benadryl) 25 mg 1X PRN  PRN IV ITCHING;  Start 3/23/20 at 

07:30;  Stop 3/24/20 at 07:29;  Status DC


Sodium Chloride 1,000 ml @  400 mls/hr Q2H30M PRN IV PATENCY;  Start 3/23/20 at 

07:28;  Stop 3/23/20 at 19:27;  Status DC


Info (PHARMACY MONITORING -- do not chart) 1 each PRN DAILY  PRN MC SEE 

COMMENTS;  Start 3/23/20 at 07:30;  Stop 4/3/20 at 13:01;  Status DC


Metronidazole 100 ml @  100 mls/hr Q6HRS IV  Last administered on 4/8/20at 

06:26;  Start 3/23/20 at 08:30;  Stop 4/8/20 at 09:58;  Status DC


Micafungin Sodium 100 mg/Dextrose 100 ml @  100 mls/hr Q24H IV  Last 

administered on 4/30/20at 08:18;  Start 3/23/20 at 09:00;  Stop 4/30/20 at 

20:58;  Status DC


Propofol 0 ml @ As Directed STK-MED ONCE IV ;  Start 3/23/20 at 07:53;  Stop 

3/23/20 at 07:53;  Status DC


Etomidate (Amidate) 20 mg STK-MED ONCE IV ;  Start 3/23/20 at 07:53;  Stop 

3/23/20 at 07:54;  Status DC


Midazolam HCl (Versed) 5 mg STK-MED ONCE .ROUTE ;  Start 3/23/20 at 07:57;  Stop

3/23/20 at 07:57;  Status DC


Fentanyl Citrate 30 ml @ 0 mls/hr CONT  PRN IV SEE PROTOCOL Last administered on

4/17/20at 06:12;  Start 3/23/20 at 08:15;  Stop 4/17/20 at 09:19;  Status DC


Artificial Tears (Artificial Tears) 1 drop PRN Q1HR  PRN OU DRY EYE, 1st choice;

 Start 3/23/20 at 08:15;  Stop 4/29/20 at 05:31;  Status DC


Midazolam HCl 50 mg/Sodium Chloride 50 ml @ 0 mls/hr CONT  PRN IV SEE PROTOCOL 

Last administered on 3/26/20at 22:39;  Start 3/23/20 at 08:15;  Stop 3/28/20 at 

15:59;  Status DC


Etomidate (Amidate) 8 mg 1X  ONCE IV  Last administered on 3/23/20at 08:33;  

Start 3/23/20 at 08:30;  Stop 3/23/20 at 08:31;  Status DC


Succinylcholine Chloride (Anectine) 120 mg 1X  ONCE IV  Last administered on 

3/23/20at 08:34;  Start 3/23/20 at 08:30;  Stop 3/23/20 at 08:31;  Status DC


Midazolam HCl (Versed) 5 mg 1X  ONCE IV ;  Start 3/23/20 at 08:30;  Stop 3/23/20

at 08:31;  Status DC


Potassium Chloride 15 meq/ Bicarbonate Dialysis Soln w/ out KCl 5,007.5 ml  @ 

1,000 mls/ hr Q5H1M IV  Last administered on 3/24/20at 11:11;  Start 3/23/20 at 

12:00;  Stop 3/24/20 at 11:15;  Status DC


Potassium Chloride 15 meq/ Bicarbonate Dialysis Soln w/ out KCl 5,007.5 ml  @ 

1,000 mls/ hr Q5H1M IV  Last administered on 3/24/20at 11:12;  Start 3/23/20 at 

12:00;  Stop 3/24/20 at 11:17;  Status DC


Potassium Chloride 15 meq/ Bicarbonate Dialysis Soln w/ out KCl 5,007.5 ml  @ 

1,000 mls/ hr Q5H1M IV  Last administered on 3/24/20at 11:11;  Start 3/23/20 at 

12:00;  Stop 3/24/20 at 11:19;  Status DC


Sodium Chloride 90 meq/Potassium Chloride 15 meq/ Potassium Phosphate 10 mmol/ 

Magnesium Sulfate 10 meq/Calcium Gluconate 20 meq/ Multivitamins 10 ml/Chromium/

Copper/Manganese/ Seleni/Zn 0.5 ml/ Total Parenteral Nutrition/Amino 

Acids/Dextrose/ Fat Emulsion Intravenous 1,400 ml @  58.333 mls/ hr TPN  CONT IV

 Last administered on 3/23/20at 21:42;  Start 3/23/20 at 22:00;  Stop 3/24/20 at

21:59;  Status DC


Heparin Sodium (Porcine) (Heparin Sodium) 5,000 unit Q8HRS SQ  Last administered

on 3/28/20at 05:55;  Start 3/23/20 at 15:00;  Stop 3/28/20 at 13:28;  Status DC


Meropenem 500 mg/ Sodium Chloride 50 ml @  100 mls/hr Q6HRS IV  Last 

administered on 3/25/20at 06:00;  Start 3/24/20 at 09:00;  Stop 3/25/20 at 

07:29;  Status DC


Potassium Phosphate 20 mmol/ Sodium Chloride 106.6667 ml @  51.667 m... 1X  ONCE

IV  Last administered on 3/24/20at 11:22;  Start 3/24/20 at 10:15;  Stop 3/24/20

at 12:18;  Status DC


Acetaminophen (Tylenol Supp) 650 mg PRN Q6HRS  PRN MD MILD PAIN / TEMP > 100.3'F

Last administered on 8/9/20at 20:07;  Start 3/24/20 at 10:30


Potassium Chloride/Water 100 ml @  100 mls/hr Q1H IV  Last administered on 

3/24/20at 12:12;  Start 3/24/20 at 11:00;  Stop 3/24/20 at 12:59;  Status DC


Potassium Chloride 20 meq/ Bicarbonate Dialysis Soln w/ out KCl 5,010 ml @  

1,000 mls/hr Q5H1M IV  Last administered on 3/25/20at 08:48;  Start 3/24/20 at 

12:00;  Stop 3/25/20 at 13:03;  Status DC


Potassium Chloride 20 meq/ Bicarbonate Dialysis Soln w/ out KCl 5,010 ml @  

1,000 mls/hr Q5H1M IV  Last administered on 3/29/20at 14:52;  Start 3/24/20 at 

11:30;  Stop 3/29/20 at 19:59;  Status DC


Potassium Chloride 20 meq/ Bicarbonate Dialysis Soln w/ out KCl 5,010 ml @  

1,000 mls/hr Q5H1M IV  Last administered on 3/29/20at 14:53;  Start 3/24/20 at 

11:30;  Stop 3/29/20 at 19:59;  Status DC


Sodium Chloride 90 meq/Potassium Chloride 15 meq/ Potassium Phosphate 15 mmol/ 

Magnesium Sulfate 10 meq/Calcium Gluconate 15 meq/ Multivitamins 10 ml/Chromium/

Copper/Manganese/ Seleni/Zn 0.5 ml/ Total Parenteral Nutrition/Amino 

Acids/Dextrose/ Fat Emulsion Intravenous 1,400 ml @  58.333 mls/ hr TPN  CONT IV

 Last administered on 3/24/20at 22:17;  Start 3/24/20 at 22:00;  Stop 3/25/20 at

21:59;  Status DC


Cefepime HCl (Maxipime) 2 gm Q12HR IVP  Last administered on 4/7/20at 20:56;  

Start 3/25/20 at 09:00;  Stop 4/8/20 at 09:58;  Status DC


Daptomycin 500 mg/ Sodium Chloride 50 ml @  100 mls/hr Q48H IV  Last 

administered on 4/10/20at 09:57;  Start 3/25/20 at 08:30;  Stop 4/10/20 at 

10:07;  Status DC


Lidocaine HCl (Buffered Lidocaine 1%) 3 ml 1X  ONCE INJ  Last administered on 

3/25/20at 10:27;  Start 3/25/20 at 10:30;  Stop 3/25/20 at 10:31;  Status DC


Potassium Phosphate 20 mmol/ Sodium Chloride 106.6667 ml @  51.667 m... 1X  ONCE

IV  Last administered on 3/25/20at 12:51;  Start 3/25/20 at 13:00;  Stop 3/25/20

at 15:03;  Status DC


Sodium Chloride 90 meq/Potassium Chloride 15 meq/ Potassium Phosphate 18 mmol/ 

Magnesium Sulfate 8 meq/Calcium Gluconate 15 meq/ Multivitamins 10 ml/Chromium/ 

Copper/Manganese/ Seleni/Zn 0.5 ml/ Total Parenteral Nutrition/Amino 

Acids/Dextrose/ Fat Emulsion Intravenous 1,400 ml @  58.333 mls/ hr TPN  CONT IV

 Last administered on 3/25/20at 22:16;  Start 3/25/20 at 22:00;  Stop 3/26/20 at

21:59;  Status DC


Potassium Chloride 20 meq/ Bicarbonate Dialysis Soln w/ out KCl 5,010 ml @  

1,000 mls/hr Q5H1M IV  Last administered on 3/29/20at 14:54;  Start 3/25/20 at 

16:00;  Stop 3/29/20 at 19:59;  Status DC


Multi-Ingred Cream/Lotion/Oil/ Oint (Artificial Tears Eye Ointment) 1 thomas PRN 

Q1HR  PRN OU DRY EYE, 2nd choice Last administered on 4/13/20at 08:19;  Start 

3/25/20 at 17:30;  Stop 6/3/20 at 14:39;  Status DC


Sodium Chloride 90 meq/Potassium Chloride 15 meq/ Potassium Phosphate 18 mmol/ 

Magnesium Sulfate 8 meq/Calcium Gluconate 15 meq/ Multivitamins 10 ml/Chromium/ 

Copper/Manganese/ Seleni/Zn 0.5 ml/ Total Parenteral Nutrition/Amino 

Acids/Dextrose/ Fat Emulsion Intravenous 1,400 ml @  58.333 mls/ hr TPN  CONT IV

 Last administered on 3/26/20at 22:00;  Start 3/26/20 at 22:00;  Stop 3/27/20 at

21:59;  Status DC


Albumin Human 500 ml @  125 mls/hr 1X  ONCE IV ;  Start 3/26/20 at 14:15;  Stop 

3/26/20 at 18:14;  Status DC


Sodium Chloride 90 meq/Potassium Chloride 15 meq/ Potassium Phosphate 18 mmol/ 

Magnesium Sulfate 8 meq/Calcium Gluconate 15 meq/ Multivitamins 10 ml/Chromium/ 

Copper/Manganese/ Seleni/Zn 0.5 ml/ Insulin Human Regular 10 unit/ Total 

Parenteral Nutrition/Amino Acids/Dextrose/ Fat Emulsion Intravenous 1,400 ml @  

58.333 mls/ hr TPN  CONT IV  Last administered on 3/27/20at 21:43;  Start 

3/27/20 at 22:00;  Stop 3/28/20 at 21:59;  Status DC


Lidocaine HCl (Buffered Lidocaine 1%) 3 ml STK-MED ONCE .ROUTE ;  Start 3/25/20 

at 10:00;  Stop 3/27/20 at 13:57;  Status DC


Midazolam HCl 100 mg/Sodium Chloride 100 ml @ 7 mls/hr CONT  PRN IV SEE PROTOCOL

Last administered on 4/8/20at 15:35;  Start 3/28/20 at 16:00;  Stop 6/3/20 at 

14:38;  Status DC


Sodium Chloride 90 meq/Potassium Chloride 15 meq/ Potassium Phosphate 18 mmol/ 

Magnesium Sulfate 8 meq/Calcium Gluconate 15 meq/ Multivitamins 10 ml/Chromium/ 

Copper/Manganese/ Seleni/Zn 0.5 ml/ Insulin Human Regular 15 unit/ Total 

Parenteral Nutrition/Amino Acids/Dextrose/ Fat Emulsion Intravenous 1,400 ml @  

58.333 mls/ hr TPN  CONT IV  Last administered on 3/28/20at 20:34;  Start 

3/28/20 at 22:00;  Stop 3/29/20 at 21:59;  Status DC


Info ([facility] Electrolyte Protocol) 1 ea CONT PRN  PRN MC PER PROTOCOL;  Start 

3/29/20 at 13:15


Sodium Chloride 90 meq/Potassium Chloride 15 meq/ Potassium Phosphate 18 mmol/ 

Magnesium Sulfate 8 meq/Calcium Gluconate 15 meq/ Multivitamins 10 ml/Chromium/ 

Copper/Manganese/ Seleni/Zn 0.5 ml/ Insulin Human Regular 15 unit/ Total 

Parenteral Nutrition/Amino Acids/Dextrose/ Fat Emulsion Intravenous 1,400 ml @  

58.333 mls/ hr TPN  CONT IV  Last administered on 3/29/20at 22:05;  Start 

3/29/20 at 22:00;  Stop 3/30/20 at 21:59;  Status DC


Potassium Chloride 15 meq/ Bicarbonate Dialysis Soln w/ out KCl 5,007.5 ml  @ 

1,000 mls/ hr Q5H1M IV  Last administered on 4/1/20at 18:14;  Start 3/29/20 at 

20:00;  Stop 4/2/20 at 13:08;  Status DC


Potassium Chloride 15 meq/ Bicarbonate Dialysis Soln w/ out KCl 5,007.5 ml  @ 

1,000 mls/ hr Q5H1M IV  Last administered on 4/1/20at 18:14;  Start 3/29/20 at 2

0:00;  Stop 4/2/20 at 13:08;  Status DC


Potassium Chloride 15 meq/ Bicarbonate Dialysis Soln w/ out KCl 5,007.5 ml  @ 

1,000 mls/ hr Q5H1M IV  Last administered on 4/1/20at 18:14;  Start 3/29/20 at 

20:00;  Stop 4/2/20 at 13:08;  Status DC


Iohexol (Omnipaque 240 Mg/ml) 30 ml 1X  ONCE PO  Last administered on 3/30/20at 

11:30;  Start 3/30/20 at 11:30;  Stop 3/30/20 at 11:33;  Status DC


Info (CONTRAST GIVEN -- Rx MONITORING) 1 each PRN DAILY  PRN MC SEE COMMENTS;  

Start 3/30/20 at 11:45;  Stop 4/1/20 at 11:44;  Status DC


Sodium Chloride 90 meq/Potassium Chloride 15 meq/ Potassium Phosphate 18 mmol/ 

Magnesium Sulfate 8 meq/Calcium Gluconate 15 meq/ Multivitamins 10 ml/Chromium/ 

Copper/Manganese/ Seleni/Zn 0.5 ml/ Insulin Human Regular 15 unit/ Total 

Parenteral Nutrition/Amino Acids/Dextrose/ Fat Emulsion Intravenous 1,400 ml @  

58.333 mls/ hr TPN  CONT IV  Last administered on 3/30/20at 21:47;  Start 

3/30/20 at 22:00;  Stop 3/31/20 at 21:59;  Status DC


Sodium Chloride 90 meq/Potassium Chloride 15 meq/ Potassium Phosphate 18 mmol/ 

Magnesium Sulfate 8 meq/Calcium Gluconate 15 meq/ Multivitamins 10 ml/Chromium/ 

Copper/Manganese/ Seleni/Zn 0.5 ml/ Insulin Human Regular 20 unit/ Total 

Parenteral Nutrition/Amino Acids/Dextrose/ Fat Emulsion Intravenous 1,400 ml @  

58.333 mls/ hr TPN  CONT IV  Last administered on 3/31/20at 21:36;  Start 

3/31/20 at 22:00;  Stop 4/1/20 at 21:59;  Status DC


Alteplase, Recombinant (Cathflo For Central Catheter Clearance) 1 mg 1X  ONCE 

INT CAT  Last administered on 3/31/20at 20:03;  Start 3/31/20 at 19:30;  Stop 

3/31/20 at 19:46;  Status DC


Alteplase, Recombinant (Cathflo For Central Catheter Clearance) 1 mg 1X  ONCE IN

T CAT  Last administered on 3/31/20at 22:05;  Start 3/31/20 at 22:00;  Stop 

3/31/20 at 22:01;  Status DC


Sodium Chloride 90 meq/Potassium Chloride 15 meq/ Potassium Phosphate 18 mmol/ 

Magnesium Sulfate 8 meq/Calcium Gluconate 15 meq/ Multivitamins 10 ml/Chromium/ 

Copper/Manganese/ Seleni/Zn 0.5 ml/ Insulin Human Regular 20 unit/ Total 

Parenteral Nutrition/Amino Acids/Dextrose/ Fat Emulsion Intravenous 1,400 ml @  

58.333 mls/ hr TPN  CONT IV  Last administered on 4/1/20at 21:30;  Start 4/1/20 

at 22:00;  Stop 4/2/20 at 21:59;  Status DC


Dexmedetomidine HCl 400 mcg/ Sodium Chloride 100 ml @ 0 mls/hr CONT  PRN IV 

ANXIETY / AGITATION Last administered on 5/30/20at 12:57;  Start 4/2/20 at 

08:15;  Stop 5/30/20 at 18:31;  Status DC


Sodium Chloride 500 ml @  500 mls/hr 1X PRN  PRN IV ELEVATED BP, SEE COMMENTS;  

Start 4/2/20 at 08:15;  Stop 8/5/20 at 09:08;  Status DC


Atropine Sulfate (ATROPINE 0.5mg SYRINGE) 0.5 mg PRN Q5MIN  PRN IV SEE COMMENTS;

 Start 4/2/20 at 08:15;  Stop 8/3/20 at 09:45;  Status DC


Furosemide (Lasix) 20 mg 1X  ONCE IVP  Last administered on 4/2/20at 08:19;  

Start 4/2/20 at 08:15;  Stop 4/2/20 at 08:16;  Status DC


Lidocaine HCl (Buffered Lidocaine 1%) 3 ml STK-MED ONCE .ROUTE ;  Start 4/2/20 

at 08:39;  Stop 4/2/20 at 08:39;  Status DC


Lidocaine HCl (Buffered Lidocaine 1%) 6 ml 1X  ONCE INJ  Last administered on 

4/2/20at 09:05;  Start 4/2/20 at 09:00;  Stop 4/2/20 at 09:06;  Status DC


Sodium Chloride 90 meq/Potassium Chloride 15 meq/ Potassium Phosphate 18 mmol/ 

Magnesium Sulfate 8 meq/Calcium Gluconate 15 meq/ Multivitamins 10 ml/Chromium/ 

Copper/Manganese/ Seleni/Zn 0.5 ml/ Insulin Human Regular 20 unit/ Total 

Parenteral Nutrition/Amino Acids/Dextrose/ Fat Emulsion Intravenous 1,400 ml @  

58.333 mls/ hr TPN  CONT IV  Last administered on 4/2/20at 22:45;  Start 4/2/20 

at 22:00;  Stop 4/3/20 at 21:59;  Status DC


Sodium Chloride 1,000 ml @  1,000 mls/hr Q1H PRN IV hypotension;  Start 4/3/20 

at 07:30;  Stop 4/3/20 at 13:29;  Status DC


Albumin Human 200 ml @  200 mls/hr 1X PRN  PRN IV Hypotension Last administered 

on 4/3/20at 09:36;  Start 4/3/20 at 07:30;  Stop 4/3/20 at 13:29;  Status DC


Sodium Chloride (Normal Saline Flush) 10 ml 1X PRN  PRN IV AP catheter pack;  

Start 4/3/20 at 07:30;  Stop 4/3/20 at 21:29;  Status DC


Sodium Chloride (Normal Saline Flush) 10 ml 1X PRN  PRN IV  catheter pack;  

Start 4/3/20 at 07:30;  Stop 4/4/20 at 07:29;  Status DC


Sodium Chloride 1,000 ml @  400 mls/hr Q2H30M PRN IV PATENCY;  Start 4/3/20 at 

07:30;  Stop 4/3/20 at 19:29;  Status DC


Info (PHARMACY MONITORING -- do not chart) 1 each PRN DAILY  PRN MC SEE 

COMMENTS;  Start 4/3/20 at 07:30;  Stop 4/3/20 at 13:02;  Status DC


Info (PHARMACY MONITORING -- do not chart) 1 each PRN DAILY  PRN MC SEE 

COMMENTS;  Start 4/3/20 at 07:30;  Stop 4/5/20 at 12:45;  Status DC


Sodium Chloride 90 meq/Potassium Chloride 15 meq/ Potassium Phosphate 10 mmol/ 

Magnesium Sulfate 8 meq/Calcium Gluconate 15 meq/ Multivitamins 10 ml/Chromium/ 

Copper/Manganese/ Seleni/Zn 0.5 ml/ Insulin Human Regular 25 unit/ Total 

Parenteral Nutrition/Amino Acids/Dextrose/ Fat Emulsion Intravenous 1,400 ml @  

58.333 mls/ hr TPN  CONT IV  Last administered on 4/3/20at 22:19;  Start 4/3/20 

at 22:00;  Stop 4/4/20 at 21:59;  Status DC


Heparin Sodium (Porcine) (Heparin Sodium) 5,000 unit Q12HR SQ  Last administered

on 4/26/20at 08:59;  Start 4/3/20 at 21:00;  Stop 4/26/20 at 10:05;  Status DC


Ondansetron HCl (Zofran) 4 mg PRN Q6HRS  PRN IV NAUSEA/VOMITING;  Start 4/6/20 

at 07:00;  Stop 4/7/20 at 06:59;  Status DC


Fentanyl Citrate (Fentanyl 2ml Vial) 25 mcg PRN Q5MIN  PRN IV MILD PAIN 1-3;  

Start 4/6/20 at 07:00;  Stop 4/7/20 at 06:59;  Status DC


Fentanyl Citrate (Fentanyl 2ml Vial) 50 mcg PRN Q5MIN  PRN IV MODERATE TO SEVERE

PAIN;  Start 4/6/20 at 07:00;  Stop 4/7/20 at 06:59;  Status DC


Ringer's Solution 1,000 ml @  30 mls/hr Q24H IV ;  Start 4/6/20 at 07:00;  Stop 

4/6/20 at 18:59;  Status DC


Lidocaine HCl (Xylocaine-Mpf 1% 2ml Vial) 2 ml PRN 1X  PRN ID PRIOR TO IV START;

 Start 4/6/20 at 07:00;  Stop 4/7/20 at 06:59;  Status DC


Prochlorperazine Edisylate (Compazine) 5 mg PACU PRN  PRN IV NAUSEA, MRX1;  

Start 4/6/20 at 07:00;  Stop 4/7/20 at 06:59;  Status DC


Sodium Chloride 1,000 ml @  1,000 mls/hr Q1H PRN IV hypotension;  Start 4/4/20 

at 09:10;  Stop 4/4/20 at 15:09;  Status DC


Albumin Human 200 ml @  200 mls/hr 1X PRN  PRN IV Hypotension Last administered 

on 4/4/20at 10:10;  Start 4/4/20 at 09:15;  Stop 4/4/20 at 15:14;  Status DC


Sodium Chloride 1,000 ml @  400 mls/hr Q2H30M PRN IV PATENCY;  Start 4/4/20 at 

09:10;  Stop 4/4/20 at 21:09;  Status DC


Info (PHARMACY MONITORING -- do not chart) 1 each PRN DAILY  PRN MC SEE 

COMMENTS;  Start 4/4/20 at 09:15;  Stop 4/5/20 at 12:45;  Status DC


Info (PHARMACY MONITORING -- do not chart) 1 each PRN DAILY  PRN MC SEE 

COMMENTS;  Start 4/4/20 at 09:15;  Stop 4/5/20 at 12:45;  Status DC


Sodium Chloride 90 meq/Potassium Chloride 15 meq/ Potassium Phosphate 10 mmol/ 

Magnesium Sulfate 8 meq/Calcium Gluconate 15 meq/ Multivitamins 10 ml/Chromium/ 

Copper/Manganese/ Seleni/Zn 0.5 ml/ Insulin Human Regular 25 unit/ Total 

Parenteral Nutrition/Amino Acids/Dextrose/ Fat Emulsion Intravenous 1,400 ml @  

58.333 mls/ hr TPN  CONT IV  Last administered on 4/4/20at 22:10;  Start 4/4/20 

at 22:00;  Stop 4/5/20 at 21:59;  Status DC


Magnesium Sulfate 50 ml @ 25 mls/hr PRN DAILY  PRN IV for Mag < 1.7 on am labs 

Last administered on 6/18/20at 10:57;  Start 4/5/20 at 09:15


Sodium Chloride 90 meq/Potassium Chloride 15 meq/ Potassium Phosphate 10 mmol/ 

Magnesium Sulfate 8 meq/Calcium Gluconate 15 meq/ Multivitamins 10 ml/Chromium/ 

Copper/Manganese/ Seleni/Zn 0.5 ml/ Insulin Human Regular 25 unit/ Total 

Parenteral Nutrition/Amino Acids/Dextrose/ Fat Emulsion Intravenous 1,400 ml @  

58.333 mls/ hr TPN  CONT IV  Last administered on 4/5/20at 21:20;  Start 4/5/20 

at 22:00;  Stop 4/6/20 at 21:59;  Status DC


Sodium Chloride 1,000 ml @  1,000 mls/hr Q1H PRN IV hypotension;  Start 4/5/20 

at 12:23;  Stop 4/5/20 at 18:22;  Status DC


Albumin Human 200 ml @  200 mls/hr 1X  ONCE IV  Last administered on 4/5/20at 

13:34;  Start 4/5/20 at 12:30;  Stop 4/5/20 at 13:29;  Status DC


Diphenhydramine HCl (Benadryl) 25 mg 1X PRN  PRN IV ITCHING;  Start 4/5/20 at 

12:30;  Stop 4/6/20 at 12:29;  Status DC


Diphenhydramine HCl (Benadryl) 25 mg 1X PRN  PRN IV ITCHING;  Start 4/5/20 at 

12:30;  Stop 4/6/20 at 12:29;  Status DC


Info (PHARMACY MONITORING -- do not chart) 1 each PRN DAILY  PRN MC SEE 

COMMENTS;  Start 4/5/20 at 12:30;  Status Cancel


Bupivacaine HCl/ Epinephrine Bitart (Sensorcain-Epi 0.5%-1:[id number] Mpf) 30 ml 

STK-MED ONCE .ROUTE  Last administered on 4/6/20at 11:44;  Start 4/6/20 at 11:

00;  Stop 4/6/20 at 11:01;  Status DC


Cellulose (Surgicel Fibrillar 1x2) 1 each STK-MED ONCE .ROUTE ;  Start 4/6/20 at

11:00;  Stop 4/6/20 at 11:01;  Status DC


Sodium Chloride 90 meq/Potassium Chloride 15 meq/ Potassium Phosphate 10 mmol/ 

Magnesium Sulfate 12 meq/Calcium Gluconate 15 meq/ Multivitamins 10 ml/Chromium/

Copper/Manganese/ Seleni/Zn 0.5 ml/ Insulin Human Regular 25 unit/ Total 

Parenteral Nutrition/Amino Acids/Dextrose/ Fat Emulsion Intravenous 1,400 ml @  

58.333 mls/ hr TPN  CONT IV  Last administered on 4/6/20at 22:24;  Start 4/6/20 

at 22:00;  Stop 4/7/20 at 21:59;  Status DC


Propofol 20 ml @ As Directed STK-MED ONCE IV ;  Start 4/6/20 at 11:07;  Stop 

4/6/20 at 11:07;  Status DC


Cellulose (Surgicel Hemostat 4x8) 1 each STK-MED ONCE .ROUTE  Last administered 

on 4/6/20at 11:44;  Start 4/6/20 at 11:55;  Stop 4/6/20 at 11:56;  Status DC


Sevoflurane (Ultane) 60 ml STK-MED ONCE IH ;  Start 4/6/20 at 12:46;  Stop 

4/6/20 at 12:46;  Status DC


Sodium Chloride 1,000 ml @  1,000 mls/hr Q1H PRN IV hypotension;  Start 4/6/20 

at 13:51;  Stop 4/6/20 at 19:50;  Status DC


Albumin Human 200 ml @  200 mls/hr 1X PRN  PRN IV Hypotension Last administered 

on 4/6/20at 14:51;  Start 4/6/20 at 14:00;  Stop 4/6/20 at 19:59;  Status DC


Diphenhydramine HCl (Benadryl) 25 mg 1X PRN  PRN IV ITCHING;  Start 4/6/20 at 

14:00;  Stop 4/7/20 at 13:59;  Status DC


Diphenhydramine HCl (Benadryl) 25 mg 1X PRN  PRN IV ITCHING;  Start 4/6/20 at 

14:00;  Stop 4/7/20 at 13:59;  Status DC


Sodium Chloride 1,000 ml @  400 mls/hr Q2H30M PRN IV PATENCY;  Start 4/6/20 at 

13:51;  Stop 4/7/20 at 01:50;  Status DC


Info (PHARMACY MONITORING -- do not chart) 1 each PRN DAILY  PRN MC SEE 

COMMENTS;  Start 4/6/20 at 14:00;  Stop 4/9/20 at 08:16;  Status DC


Heparin Sodium (Porcine) (Hep Lock Adult) 500 unit STK-MED ONCE IVP ;  Start 

4/7/20 at 09:29;  Stop 4/7/20 at 09:30;  Status DC


Sodium Chloride 1,000 ml @  1,000 mls/hr Q1H PRN IV hypotension;  Start 4/7/20 

at 10:43;  Stop 4/7/20 at 16:42;  Status DC


Sodium Chloride 1,000 ml @  400 mls/hr Q2H30M PRN IV PATENCY;  Start 4/7/20 at 

10:43;  Stop 4/7/20 at 22:42;  Status DC


Info (PHARMACY MONITORING -- do not chart) 1 each PRN DAILY  PRN MC SEE 

COMMENTS;  Start 4/7/20 at 10:45;  Status UNV


Info (PHARMACY MONITORING -- do not chart) 1 each PRN DAILY  PRN MC SEE COM

MENTS;  Start 4/7/20 at 10:45;  Status UNV


Sodium Chloride 90 meq/Potassium Chloride 15 meq/ Magnesium Sulfate 12 

meq/Calcium Gluconate 15 meq/ Multivitamins 10 ml/Chromium/ Copper/Manganese/ 

Seleni/Zn 0.5 ml/ Insulin Human Regular 25 unit/ Total Parenteral 

Nutrition/Amino Acids/Dextrose/ Fat Emulsion Intravenous 1,400 ml @  58.333 mls/

hr TPN  CONT IV  Last administered on 4/7/20at 22:13;  Start 4/7/20 at 22:00;  

Stop 4/8/20 at 21:59;  Status DC


Sodium Chloride 1,000 ml @  1,000 mls/hr Q1H PRN IV hypotension;  Start 4/8/20 

at 07:50;  Stop 4/8/20 at 13:49;  Status DC


Albumin Human 200 ml @  200 mls/hr 1X  ONCE IV ;  Start 4/8/20 at 08:00;  Stop 

4/8/20 at 08:53;  Status DC


Diphenhydramine HCl (Benadryl) 25 mg 1X PRN  PRN IV ITCHING;  Start 4/8/20 at 

08:00;  Stop 4/9/20 at 07:59;  Status DC


Diphenhydramine HCl (Benadryl) 25 mg 1X PRN  PRN IV ITCHING;  Start 4/8/20 at 

08:00;  Stop 4/9/20 at 07:59;  Status DC


Info (PHARMACY MONITORING -- do not chart) 1 each PRN DAILY  PRN MC SEE 

COMMENTS;  Start 4/8/20 at 08:00;  Stop 4/9/20 at 08:16;  Status DC


Albumin Human 50 ml @ 50 mls/hr 1X  ONCE IV ;  Start 4/8/20 at 08:53;  Stop 

4/8/20 at 08:56;  Status DC


Albumin Human 200 ml @  50 mls/hr PRN 1X  PRN IV HYPOTENSION Last administered 

on 4/14/20at 11:54;  Start 4/8/20 at 09:00;  Stop 5/21/20 at 11:14;  Status DC


Meropenem 500 mg/ Sodium Chloride 50 ml @  100 mls/hr Q12H IV  Last administered

on 4/28/20at 10:45;  Start 4/8/20 at 10:00;  Stop 4/28/20 at 12:37;  Status DC


Sodium Chloride 90 meq/Magnesium Sulfate 12 meq/ Calcium Gluconate 15 meq/ 

Multivitamins 10 ml/Chromium/ Copper/Manganese/ Seleni/Zn 0.5 ml/ Insulin Human 

Regular 25 unit/ Total Parenteral Nutrition/Amino Acids/Dextrose/ Fat Emulsion 

Intravenous 1,400 ml @  58.333 mls/ hr TPN  CONT IV  Last administered on 4/8 /20at 21:41;  Start 4/8/20 at 22:00;  Stop 4/9/20 at 21:59;  Status DC


Sodium Chloride 1,000 ml @  1,000 mls/hr Q1H PRN IV hypotension;  Start 4/9/20 

at 07:58;  Stop 4/9/20 at 13:57;  Status DC


Albumin Human 200 ml @  200 mls/hr 1X PRN  PRN IV Hypotension Last administered 

on 4/9/20at 09:30;  Start 4/9/20 at 08:00;  Stop 4/9/20 at 13:59;  Status DC


Sodium Chloride 1,000 ml @  400 mls/hr Q2H30M PRN IV PATENCY;  Start 4/9/20 at 

07:58;  Stop 4/9/20 at 19:57;  Status DC


Info (PHARMACY MONITORING -- do not chart) 1 each PRN DAILY  PRN MC SEE 

COMMENTS;  Start 4/9/20 at 08:00;  Status Cancel


Info (PHARMACY MONITORING -- do not chart) 1 each PRN DAILY  PRN MC SEE 

COMMENTS;  Start 4/9/20 at 08:15;  Status UNV


Sodium Chloride 90 meq/Potassium Phosphate 5 mmol/ Magnesium Sulfate 12 

meq/Calcium Gluconate 15 meq/ Multivitamins 10 ml/Chromium/ Copper/Manganese/ 

Seleni/Zn 0.5 ml/ Insulin Human Regular 30 unit/ Total Parenteral 

Nutrition/Amino Acids/Dextrose/ Fat Emulsion Intravenous 1,400 ml @  58.333 mls/

hr TPN  CONT IV  Last administered on 4/9/20at 22:08;  Start 4/9/20 at 22:00;  

Stop 4/10/20 at 21:59;  Status DC


Linezolid/Dextrose 300 ml @  300 mls/hr Q12HR IV  Last administered on 4/20/20at

20:40;  Start 4/10/20 at 11:00;  Stop 4/21/20 at 08:10;  Status DC


Sodium Chloride 90 meq/Potassium Phosphate 15 mmol/ Magnesium Sulfate 12 

meq/Calcium Gluconate 15 meq/ Multivitamins 10 ml/Chromium/ Copper/Manganese/ 

Seleni/Zn 0.5 ml/ Insulin Human Regular 30 unit/ Total Parenteral 

Nutrition/Amino Acids/Dextrose/ Fat Emulsion Intravenous 1,400 ml @  58.333 mls/

hr TPN  CONT IV  Last administered on 4/10/20at 21:49;  Start 4/10/20 at 22:00; 

Stop 4/11/20 at 21:59;  Status DC


Sodium Chloride 90 meq/Potassium Phosphate 15 mmol/ Magnesium Sulfate 12 

meq/Calcium Gluconate 15 meq/ Multivitamins 10 ml/Chromium/ Copper/Manganese/ 

Seleni/Zn 0.5 ml/ Insulin Human Regular 40 unit/ Total Parenteral 

Nutrition/Amino Acids/Dextrose/ Fat Emulsion Intravenous 1,400 ml @  58.333 mls/

hr TPN  CONT IV  Last administered on 4/11/20at 21:21;  Start 4/11/20 at 22:00; 

Stop 4/12/20 at 21:59;  Status DC


Sodium Chloride 1,000 ml @  1,000 mls/hr Q1H PRN IV hypotension;  Start 4/11/20 

at 13:26;  Stop 4/11/20 at 19:25;  Status DC


Albumin Human 200 ml @  200 mls/hr 1X PRN  PRN IV Hypotension Last administered 

on 4/11/20at 15:00;  Start 4/11/20 at 13:30;  Stop 4/11/20 at 19:29;  Status DC


Sodium Chloride (Normal Saline Flush) 10 ml 1X PRN  PRN IV AP catheter pack;  

Start 4/11/20 at 13:30;  Stop 4/12/20 at 13:29;  Status DC


Sodium Chloride (Normal Saline Flush) 10 ml 1X PRN  PRN IV  catheter pack;  

Start 4/11/20 at 13:30;  Stop 4/12/20 at 13:29;  Status DC


Sodium Chloride 1,000 ml @  400 mls/hr Q2H30M PRN IV PATENCY;  Start 4/11/20 at 

13:26;  Stop 4/12/20 at 01:25;  Status DC


Info (PHARMACY MONITORING -- do not chart) 1 each PRN DAILY  PRN MC SEE 

COMMENTS;  Start 4/11/20 at 13:30;  Stop 4/11/20 at 13:33;  Status DC


Info (PHARMACY MONITORING -- do not chart) 1 each PRN DAILY  PRN MC SEE 

COMMENTS;  Start 4/11/20 at 13:30;  Stop 4/11/20 at 13:34;  Status DC


Sodium Chloride 90 meq/Potassium Phosphate 19 mmol/ Magnesium Sulfate 12 

meq/Calcium Gluconate 15 meq/ Multivitamins 10 ml/Chromium/ Copper/Manganese/ 

Seleni/Zn 0.5 ml/ Insulin Human Regular 40 unit/ Total Parenteral 

Nutrition/Amino Acids/Dextrose/ Fat Emulsion Intravenous 1,400 ml @  58.333 mls/

hr TPN  CONT IV  Last administered on 4/12/20at 21:54;  Start 4/12/20 at 22:00; 

Stop 4/13/20 at 21:59;  Status DC


Sodium Chloride 1,000 ml @  1,000 mls/hr Q1H PRN IV hypotension;  Start 4/13/20 

at 09:35;  Stop 4/13/20 at 15:34;  Status DC


Albumin Human 200 ml @  200 mls/hr 1X PRN  PRN IV Hypotension;  Start 4/13/20 at

09:45;  Stop 4/13/20 at 15:44;  Status DC


Diphenhydramine HCl (Benadryl) 25 mg 1X PRN  PRN IV ITCHING;  Start 4/13/20 at 

09:45;  Stop 4/14/20 at 09:44;  Status DC


Diphenhydramine HCl (Benadryl) 25 mg 1X PRN  PRN IV ITCHING;  Start 4/13/20 at 

09:45;  Stop 4/14/20 at 09:44;  Status DC


Sodium Chloride 1,000 ml @  400 mls/hr Q2H30M PRN IV PATENCY;  Start 4/13/20 at 

09:35;  Stop 4/13/20 at 21:34;  Status DC


Info (PHARMACY MONITORING -- do not chart) 1 each PRN DAILY  PRN MC SEE 

COMMENTS;  Start 4/13/20 at 09:45;  Status Cancel


Sodium Chloride 100 meq/Potassium Phosphate 19 mmol/ Magnesium Sulfate 12 

meq/Calcium Gluconate 15 meq/ Multivitamins 10 ml/Chromium/ Copper/Manganese/ 

Seleni/Zn 0.5 ml/ Insulin Human Regular 40 unit/ Potassium Chloride 20 meq/ 

Total Parenteral Nutrition/Amino Acids/Dextrose/ Fat Emulsion Intravenous 1,400 

ml @  58.333 mls/ hr TPN  CONT IV  Last administered on 4/13/20at 22:02;  Start 

4/13/20 at 22:00;  Stop 4/14/20 at 21:59;  Status DC


Furosemide (Lasix) 40 mg 1X  ONCE IVP  Last administered on 4/13/20at 14:39;  

Start 4/13/20 at 14:30;  Stop 4/13/20 at 14:31;  Status DC


Metronidazole 100 ml @  100 mls/hr Q8HRS IV  Last administered on 4/21/20at 06:0

4;  Start 4/14/20 at 10:00;  Stop 4/21/20 at 08:10;  Status DC


Sodium Chloride 1,000 ml @  1,000 mls/hr Q1H PRN IV hypotension;  Start 4/14/20 

at 08:00;  Stop 4/14/20 at 13:59;  Status DC


Albumin Human 200 ml @  200 mls/hr 1X PRN  PRN IV Hypotension;  Start 4/14/20 at

08:00;  Stop 4/14/20 at 13:59;  Status DC


Sodium Chloride 1,000 ml @  400 mls/hr Q2H30M PRN IV PATENCY;  Start 4/14/20 at 

08:00;  Stop 4/14/20 at 19:59;  Status DC


Info (PHARMACY MONITORING -- do not chart) 1 each PRN DAILY  PRN MC SEE COMMENT

S;  Start 4/14/20 at 11:30;  Status UNV


Info (PHARMACY MONITORING -- do not chart) 1 each PRN DAILY  PRN MC SEE 

COMMENTS;  Start 4/14/20 at 11:30;  Stop 4/16/20 at 12:13;  Status DC


Sodium Chloride 100 meq/Potassium Phosphate 19 mmol/ Magnesium Sulfate 12 

meq/Calcium Gluconate 15 meq/ Multivitamins 10 ml/Chromium/ Copper/Manganese/ 

Seleni/Zn 0.5 ml/ Insulin Human Regular 40 unit/ Potassium Chloride 20 meq/ 

Total Parenteral Nutrition/Amino Acids/Dextrose/ Fat Emulsion Intravenous 1,400 

ml @  58.333 mls/ hr TPN  CONT IV  Last administered on 4/14/20at 21:52;  Start 

4/14/20 at 22:00;  Stop 4/15/20 at 21:59;  Status DC


Sodium Chloride (Normal Saline Flush) 10 ml QSHIFT  PRN IV AFTER MEDS AND BLOOD 

DRAWS;  Start 4/14/20 at 15:00;  Stop 5/12/20 at 11:27;  Status DC


Sodium Chloride (Normal Saline Flush) 10 ml PRN Q5MIN  PRN IV AFTER MEDS AND 

BLOOD DRAWS;  Start 4/14/20 at 15:00;  Stop 8/1/20 at 10:12;  Status DC


Sodium Chloride (Normal Saline Flush) 20 ml PRN Q5MIN  PRN IV AFTER MEDS AND 

BLOOD DRAWS;  Start 4/14/20 at 15:00


Sodium Chloride 100 meq/Potassium Phosphate 19 mmol/ Magnesium Sulfate 12 

meq/Calcium Gluconate 15 meq/ Multivitamins 10 ml/Chromium/ Copper/Manganese/ 

Seleni/Zn 0.5 ml/ Insulin Human Regular 40 unit/ Potassium Chloride 20 meq/ 

Total Parenteral Nutrition/Amino Acids/Dextrose/ Fat Emulsion Intravenous 1,400 

ml @  58.333 mls/ hr TPN  CONT IV  Last administered on 4/15/20at 21:20;  Start 

4/15/20 at 22:00;  Stop 4/16/20 at 21:59;  Status DC


Lidocaine HCl (Buffered Lidocaine 1%) 3 ml STK-MED ONCE .ROUTE ;  Start 4/15/20 

at 13:16;  Stop 4/15/20 at 13:16;  Status DC


Lidocaine HCl (Buffered Lidocaine 1%) 6 ml 1X  ONCE INJ  Last administered on 

4/15/20at 13:45;  Start 4/15/20 at 13:30;  Stop 4/15/20 at 13:31;  Status DC


Albumin Human 100 ml @  100 mls/hr 1X  ONCE IV  Last administered on 4/15/20at 

15:41;  Start 4/15/20 at 15:00;  Stop 4/15/20 at 15:59;  Status DC


Albumin Human 50 ml @ 50 mls/hr 1X  ONCE IV  Last administered on 4/15/20at 

15:00;  Start 4/15/20 at 15:00;  Stop 4/15/20 at 15:59;  Status DC


Info (PHARMACY MONITORING -- do not chart) 1 each PRN DAILY  PRN MC SEE 

COMMENTS;  Start 4/16/20 at 11:30;  Status Cancel


Info (PHARMACY MONITORING -- do not chart) 1 each PRN DAILY  PRN MC SEE 

COMMENTS;  Start 4/16/20 at 11:30;  Status UNV


Sodium Chloride 100 meq/Potassium Phosphate 10 mmol/ Magnesium Sulfate 12 

meq/Calcium Gluconate 15 meq/ Multivitamins 10 ml/Chromium/ Copper/Manganese/ 

Seleni/Zn 0.5 ml/ Insulin Human Regular 35 unit/ Potassium Chloride 20 meq/ 

Total Parenteral Nutrition/Amino Acids/Dextrose/ Fat Emulsion Intravenous 1,400 

ml @  58.333 mls/ hr TPN  CONT IV  Last administered on 4/16/20at 22:10;  Start 

4/16/20 at 22:00;  Stop 4/17/20 at 21:59;  Status DC


Sodium Chloride 100 meq/Potassium Phosphate 5 mmol/ Magnesium Sulfate 12 

meq/Calcium Gluconate 15 meq/ Multivitamins 10 ml/Chromium/ Copper/Manganese/ 

Seleni/Zn 0.5 ml/ Insulin Human Regular 35 unit/ Potassium Chloride 20 meq/ 

Total Parenteral Nutrition/Amino Acids/Dextrose/ Fat Emulsion Intravenous 1,400 

ml @  58.333 mls/ hr TPN  CONT IV  Last administered on 4/17/20at 22:59;  Start 

4/17/20 at 22:00;  Stop 4/18/20 at 21:59;  Status DC


Sodium Chloride 1,000 ml @  1,000 mls/hr Q1H PRN IV hypotension;  Start 4/18/20 

at 08:27;  Stop 4/18/20 at 14:26;  Status DC


Albumin Human 200 ml @  200 mls/hr 1X PRN  PRN IV Hypotension Last administered 

on 4/18/20at 09:18;  Start 4/18/20 at 08:30;  Stop 4/18/20 at 14:29;  Status DC


Sodium Chloride 1,000 ml @  400 mls/hr Q2H30M PRN IV PATENCY;  Start 4/18/20 at 

08:27;  Stop 4/18/20 at 20:26;  Status DC


Info (PHARMACY MONITORING -- do not chart) 1 each PRN DAILY  PRN MC SEE 

COMMENTS;  Start 4/18/20 at 08:30;  Status Cancel


Info (PHARMACY MONITORING -- do not chart) 1 each PRN DAILY  PRN MC SEE 

COMMENTS;  Start 4/18/20 at 08:30;  Stop 4/26/20 at 13:10;  Status DC


Sodium Chloride 100 meq/Potassium Chloride 40 meq/ Magnesium Sulfate 15 

meq/Calcium Gluconate 15 meq/ Multivitamins 10 ml/Chromium/ Copper/Manganese/ 

Seleni/Zn 0.5 ml/ Insulin Human Regular 35 unit/ Total Parenteral 

Nutrition/Amino Acids/Dextrose/ Fat Emulsion Intravenous 1,400 ml @  58.333 mls/

hr TPN  CONT IV  Last administered on 4/18/20at 22:00;  Start 4/18/20 at 22:00; 

Stop 4/19/20 at 21:59;  Status DC


Potassium Chloride/Water 100 ml @  100 mls/hr 1X  ONCE IV  Last administered on 

4/18/20at 17:28;  Start 4/18/20 at 14:45;  Stop 4/18/20 at 15:44;  Status DC


Sodium Chloride 100 meq/Potassium Chloride 40 meq/ Magnesium Sulfate 15 meq

/Calcium Gluconate 15 meq/ Multivitamins 10 ml/Chromium/ Copper/Manganese/ 

Seleni/Zn 0.5 ml/ Insulin Human Regular 35 unit/ Total Parenteral 

Nutrition/Amino Acids/Dextrose/ Fat Emulsion Intravenous 1,400 ml @  58.333 mls/

hr TPN  CONT IV  Last administered on 4/19/20at 22:46;  Start 4/19/20 at 22:00; 

Stop 4/20/20 at 21:59;  Status DC


Sodium Chloride 100 meq/Potassium Chloride 40 meq/ Magnesium Sulfate 20 

meq/Calcium Gluconate 15 meq/ Multivitamins 10 ml/Chromium/ Copper/Manganese/ 

Seleni/Zn 0.5 ml/ Insulin Human Regular 35 unit/ Total Parenteral 

Nutrition/Amino Acids/Dextrose/ Fat Emulsion Intravenous 1,400 ml @  58.333 mls/

hr TPN  CONT IV  Last administered on 4/20/20at 22:31;  Start 4/20/20 at 22:00; 

Stop 4/21/20 at 21:59;  Status DC


Fentanyl Citrate (Fentanyl 2ml Vial) 50 mcg PRN Q2HR  PRN IVP PAIN Last 

administered on 4/27/20at 13:32;  Start 4/20/20 at 21:00;  Stop 4/28/20 at 

12:53;  Status DC


Fentanyl Citrate (Fentanyl 2ml Vial) 25 mcg PRN Q2HR  PRN IVP PAIN;  Start 

4/20/20 at 21:00;  Stop 4/28/20 at 12:54;  Status DC


Enoxaparin Sodium (Lovenox 100mg Syringe) 100 mg Q12HR SQ ;  Start 4/21/20 at 

21:00;  Status UNV


Amino Acids/ Glycerin/ Electrolytes 1,000 ml @  75 mls/hr [id number] IV ;  Start 

4/20/20 at 21:15;  Status UNV


Sodium Chloride 1,000 ml @  1,000 mls/hr Q1H PRN IV hypotension;  Start 4/21/20 

at 07:56;  Stop 4/21/20 at 13:55;  Status DC


Albumin Human 200 ml @  200 mls/hr 1X PRN  PRN IV Hypotension Last administered 

on 4/21/20at 08:40;  Start 4/21/20 at 08:00;  Stop 4/21/20 at 13:59;  Status DC


Sodium Chloride 1,000 ml @  400 mls/hr Q2H30M PRN IV PATENCY;  Start 4/21/20 at 

07:56;  Stop 4/21/20 at 19:55;  Status DC


Info (PHARMACY MONITORING -- do not chart) 1 each PRN DAILY  PRN MC SEE 

COMMENTS;  Start 4/21/20 at 08:00;  Status UNV


Info (PHARMACY MONITORING -- do not chart) 1 each PRN DAILY  PRN MC SEE 

COMMENTS;  Start 4/21/20 at 08:00;  Status UNV


Daptomycin 430 mg/ Sodium Chloride 50 ml @  100 mls/hr Q24H IV  Last 

administered on 4/21/20at 12:35;  Start 4/21/20 at 09:00;  Stop 4/21/20 at 

12:49;  Status DC


Sodium Chloride 100 meq/Potassium Chloride 40 meq/ Magnesium Sulfate 20 

meq/Calcium Gluconate 15 meq/ Multivitamins 10 ml/Chromium/ Copper/Manganese/ 

Seleni/Zn 0.5 ml/ Insulin Human Regular 35 unit/ Total Parenteral 

Nutrition/Amino Acids/Dextrose/ Fat Emulsion Intravenous 1,400 ml @  58.333 mls/

hr TPN  CONT IV  Last administered on 4/21/20at 21:26;  Start 4/21/20 at 22:00; 

Stop 4/22/20 at 21:59;  Status DC


Daptomycin 430 mg/ Sodium Chloride 50 ml @  100 mls/hr Q48H IV ;  Start 4/23/20 

at 09:00;  Stop 4/22/20 at 11:55;  Status DC


Sodium Chloride 100 meq/Potassium Chloride 40 meq/ Magnesium Sulfate 20 

meq/Calcium Gluconate 15 meq/ Multivitamins 10 ml/Chromium/ Copper/Manganese/ 

Seleni/Zn 0.5 ml/ Insulin Human Regular 35 unit/ Total Parenteral Nutrition

/Amino Acids/Dextrose/ Fat Emulsion Intravenous 1,400 ml @  58.333 mls/ hr TPN  

CONT IV  Last administered on 4/22/20at 22:27;  Start 4/22/20 at 22:00;  Stop 

4/23/20 at 21:59;  Status DC


Daptomycin 430 mg/ Sodium Chloride 50 ml @  100 mls/hr Q24H IV  Last 

administered on 4/24/20at 15:07;  Start 4/22/20 at 13:00;  Stop 4/25/20 at 

13:15;  Status DC


Sodium Chloride 100 meq/Potassium Chloride 40 meq/ Magnesium Sulfate 20 

meq/Calcium Gluconate 10 meq/ Multivitamins 10 ml/Chromium/ Copper/Manganese/ 

Seleni/Zn 0.5 ml/ Insulin Human Regular 35 unit/ Total Parenteral 

Nutrition/Amino Acids/Dextrose/ Fat Emulsion Intravenous 1,400 ml @  58.333 mls/

hr TPN  CONT IV  Last administered on 4/24/20at 00:06;  Start 4/23/20 at 22:00; 

Stop 4/24/20 at 21:59;  Status DC


Alteplase, Recombinant (Cathflo For Central Catheter Clearance) 1 mg 1X  ONCE 

INT CAT  Last administered on 4/24/20at 11:44;  Start 4/24/20 at 10:45;  Stop 

4/24/20 at 10:46;  Status DC


Ondansetron HCl (Zofran) 4 mg PRN Q6HRS  PRN IV NAUSEA/VOMITING;  Start 4/27/20 

at 07:00;  Stop 4/28/20 at 06:59;  Status DC


Fentanyl Citrate (Fentanyl 2ml Vial) 25 mcg PRN Q5MIN  PRN IV MILD PAIN 1-3;  

Start 4/27/20 at 07:00;  Stop 4/28/20 at 06:59;  Status DC


Fentanyl Citrate (Fentanyl 2ml Vial) 50 mcg PRN Q5MIN  PRN IV MODERATE TO SEVERE

PAIN Last administered on 4/27/20at 10:17;  Start 4/27/20 at 07:00;  Stop 

4/28/20 at 06:59;  Status DC


Ringer's Solution 1,000 ml @  30 mls/hr Q24H IV ;  Start 4/27/20 at 07:00;  Stop

4/27/20 at 18:59;  Status DC


Lidocaine HCl (Xylocaine-Mpf 1% 2ml Vial) 2 ml PRN 1X  PRN ID PRIOR TO IV START;

 Start 4/27/20 at 07:00;  Stop 4/28/20 at 06:59;  Status DC


Prochlorperazine Edisylate (Compazine) 5 mg PACU PRN  PRN IV NAUSEA, MRX1;  

Start 4/27/20 at 07:00;  Stop 4/28/20 at 06:59;  Status DC


Sodium Acetate 50 meq/Potassium Acetate 55 meq/ Magnesium Sulfate 20 meq/Calcium

Gluconate 10 meq/ Multivitamins 10 ml/Chromium/ Copper/Manganese/ Seleni/Zn 0.5 

ml/ Insulin Human Regular 35 unit/ Total Parenteral Nutrition/Amino 

Acids/Dextrose/ Fat Emulsion Intravenous 1,400 ml @  58.333 mls/ hr TPN  CONT IV

;  Start 4/24/20 at 22:00;  Stop 4/24/20 at 14:15;  Status DC


Sodium Acetate 50 meq/Potassium Acetate 55 meq/ Magnesium Sulfate 20 meq/Calcium

Gluconate 10 meq/ Multivitamins 10 ml/Chromium/ Copper/Manganese/ Seleni/Zn 0.5 

ml/ Insulin Human Regular 35 unit/ Total Parenteral Nutrition/Amino 

Acids/Dextrose/ Fat Emulsion Intravenous 1,800 ml @  75 mls/hr TPN  CONT IV  

Last administered on 4/24/20at 22:38;  Start 4/24/20 at 22:00;  Stop 4/25/20 at 

21:59;  Status DC


Sodium Chloride 1,000 ml @  1,000 mls/hr Q1H PRN IV hypotension;  Start 4/24/20 

at 15:31;  Stop 4/24/20 at 21:30;  Status DC


Diphenhydramine HCl (Benadryl) 25 mg 1X PRN  PRN IV ITCHING;  Start 4/24/20 at 

15:45;  Stop 4/25/20 at 15:44;  Status DC


Diphenhydramine HCl (Benadryl) 25 mg 1X PRN  PRN IV ITCHING;  Start 4/24/20 at 

15:45;  Stop 4/25/20 at 15:44;  Status DC


Sodium Chloride 1,000 ml @  400 mls/hr Q2H30M PRN IV PATENCY;  Start 4/24/20 at 

15:31;  Stop 4/25/20 at 03:30;  Status DC


Info (PHARMACY MONITORING -- do not chart) 1 each PRN DAILY  PRN MC SEE 

COMMENTS;  Start 4/24/20 at 15:45;  Stop 5/26/20 at 14:14;  Status DC


Sodium Acetate 50 meq/Potassium Acetate 55 meq/ Magnesium Sulfate 20 meq/Calcium

Gluconate 10 meq/ Multivitamins 10 ml/Chromium/ Copper/Manganese/ Seleni/Zn 0.5 

ml/ Insulin Human Regular 35 unit/ Total Parenteral Nutrition/Amino 

Acids/Dextrose/ Fat Emulsion Intravenous 1,800 ml @  75 mls/hr TPN  CONT IV  

Last administered on 4/25/20at 22:03;  Start 4/25/20 at 22:00;  Stop 4/26/20 at 

21:59;  Status DC


Daptomycin 430 mg/ Sodium Chloride 50 ml @  100 mls/hr Q24H IV  Last 

administered on 4/30/20at 13:00;  Start 4/25/20 at 13:00;  Stop 4/30/20 at 

20:58;  Status DC


Heparin Sodium (Porcine) 1000 unit/Sodium Chloride 1,001 ml @  1,001 mls/hr 1X  

ONCE IRR ;  Start 4/27/20 at 06:00;  Stop 4/27/20 at 06:59;  Status DC


Potassium Acetate 55 meq/Magnesium Sulfate 20 meq/ Calcium Gluconate 10 meq/ 

Multivitamins 10 ml/Chromium/ Copper/Manganese/ Seleni/Zn 0.5 ml/ Insulin Human 

Regular 35 unit/ Total Parenteral Nutrition/Amino Acids/Dextrose/ Fat Emulsion 

Intravenous 1,920 ml @  80 mls/hr TPN  CONT IV  Last administered on 4/26/20at 

22:10;  Start 4/26/20 at 22:00;  Stop 4/27/20 at 21:59;  Status DC


Dexamethasone Sodium Phosphate (Decadron) 4 mg STK-MED ONCE .ROUTE ;  Start 

4/27/20 at 10:56;  Stop 4/27/20 at 10:57;  Status DC


Ondansetron HCl (Zofran) 4 mg STK-MED ONCE .ROUTE ;  Start 4/27/20 at 10:56;  

Stop 4/27/20 at 10:57;  Status DC


Rocuronium Bromide (Zemuron) 50 mg STK-MED ONCE .ROUTE ;  Start 4/27/20 at 

10:56;  Stop 4/27/20 at 10:57;  Status DC


Fentanyl Citrate (Fentanyl 2ml Vial) 100 mcg STK-MED ONCE .ROUTE ;  Start 

4/27/20 at 10:56;  Stop 4/27/20 at 10:57;  Status DC


Bupivacaine HCl/ Epinephrine Bitart (Sensorcain-Epi 0.5%-1:[id number] Mpf) 30 ml 

STK-MED ONCE .ROUTE  Last administered on 4/27/20at 12:01;  Start 4/27/20 at 

10:58;  Stop 4/27/20 at 10:58;  Status DC


Cellulose (Surgicel Hemostat 2x14) 1 each STK-MED ONCE .ROUTE ;  Start 4/27/20 

at 10:58;  Stop 4/27/20 at 10:59;  Status DC


Iohexol (Omnipaque 300 Mg/ml) 50 ml STK-MED ONCE .ROUTE ;  Start 4/27/20 at 

10:58;  Stop 4/27/20 at 10:59;  Status DC


Cellulose (Surgicel Hemostat 4x8) 1 each STK-MED ONCE .ROUTE ;  Start 4/27/20 at

10:58;  Stop 4/27/20 at 10:59;  Status DC


Bisacodyl (Dulcolax Supp) 10 mg STK-MED ONCE .ROUTE ;  Start 4/27/20 at 10:59;  

Stop 4/27/20 at 10:59;  Status DC


Heparin Sodium (Porcine) 1000 unit/Sodium Chloride 1,001 ml @  1,001 mls/hr 1X  

ONCE IRR ;  Start 4/27/20 at 12:00;  Stop 4/27/20 at 12:59;  Status DC


Propofol 20 ml @ As Directed STK-MED ONCE IV ;  Start 4/27/20 at 11:05;  Stop 

4/27/20 at 11:05;  Status DC


Sevoflurane (Ultane) 90 ml STK-MED ONCE IH ;  Start 4/27/20 at 11:05;  Stop 

4/27/20 at 11:05;  Status DC


Sevoflurane (Ultane) 60 ml STK-MED ONCE IH ;  Start 4/27/20 at 12:26;  Stop 

4/27/20 at 12:27;  Status DC


Propofol 20 ml @ As Directed STK-MED ONCE IV ;  Start 4/27/20 at 12:26;  Stop 

4/27/20 at 12:27;  Status DC


Phenylephrine HCl (PHENYLEPHRINE in 0.9% NACL PF) 1 mg STK-MED ONCE IV ;  Start 

4/27/20 at 12:34;  Stop 4/27/20 at 12:34;  Status DC


Heparin Sodium (Porcine) (Heparin Sodium) 5,000 unit Q12HR SQ  Last administered

on 5/6/20at 20:57;  Start 4/27/20 at 21:00;  Stop 5/7/20 at 09:59;  Status DC


Sodium Chloride (Normal Saline Flush) 3 ml QSHIFT  PRN IV AFTER MEDS AND BLOOD 

DRAWS;  Start 4/27/20 at 13:45;  Status Cancel


Naloxone HCl (Narcan) 0.4 mg PRN Q2MIN  PRN IV SEE INSTRUCTIONS Last 

administered on 6/6/20at 15:15;  Start 4/27/20 at 13:45;  Stop 7/1/20 at 16:00; 

Status DC


Sodium Chloride 1,000 ml @  25 mls/hr Q24H IV  Last administered on 5/26/20at 

13:37;  Start 4/27/20 at 13:37;  Stop 5/29/20 at 13:09;  Status DC


Naloxone HCl (Narcan) 0.4 mg PRN Q2MIN  PRN IV SEE INSTRUCTIONS;  Start 4/27/20 

at 14:30;  Status UNV


Sodium Chloride 1,000 ml @  25 mls/hr Q24H IV ;  Start 4/27/20 at 14:30;  Status

UNV


Hydromorphone HCl 30 ml @ 0 mls/hr CONT PRN  PRN IV PER PROTOCOL Last 

administered on 5/2/20at 16:08;  Start 4/27/20 at 14:30;  Stop 5/4/20 at 08:55; 

Status DC


Potassium Acetate 55 meq/Magnesium Sulfate 20 meq/ Calcium Gluconate 10 meq/ M

ultivitamins 10 ml/Chromium/ Copper/Manganese/ Seleni/Zn 0.5 ml/ Insulin Human 

Regular 35 unit/ Total Parenteral Nutrition/Amino Acids/Dextrose/ Fat Emulsion 

Intravenous 1,920 ml @  80 mls/hr TPN  CONT IV  Last administered on 4/27/20at 

22:01;  Start 4/27/20 at 22:00;  Stop 4/28/20 at 21:59;  Status DC


Bumetanide (Bumex) 2 mg [id number] IV  Last administered on 5/1/20at 13:50;  Start 4/ 28/20 at 14:00;  Stop 5/2/20 at 14:10;  Status DC


Meropenem 1 gm/ Sodium Chloride 100 ml @  200 mls/hr Q8HRS IV  Last administered

on 5/22/20at 05:53;  Start 4/28/20 at 14:00;  Stop 5/22/20 at 09:31;  Status DC


Potassium Acetate 55 meq/Magnesium Sulfate 20 meq/ Calcium Gluconate 10 meq/ 

Multivitamins 10 ml/Chromium/ Copper/Manganese/ Seleni/Zn 0.5 ml/ Insulin Human 

Regular 35 unit/ Total Parenteral Nutrition/Amino Acids/Dextrose/ Fat Emulsion 

Intravenous 1,920 ml @  80 mls/hr TPN  CONT IV  Last administered on 4/28/20at 

22:02;  Start 4/28/20 at 22:00;  Stop 4/29/20 at 21:59;  Status DC


Hydromorphone HCl (Dilaudid Standard PCA) 12 mg STK-MED ONCE IV ;  Start 4/27/20

at 14:35;  Stop 4/28/20 at 13:53;  Status DC


Artificial Tears (Artificial Tears) 1 drop PRN Q15MIN  PRN OU DRY EYE Last 

administered on 6/23/20at 21:17;  Start 4/29/20 at 05:30


Hydromorphone HCl (Dilaudid Standard PCA) 12 mg STK-MED ONCE IV ;  Start 4/28/20

at 12:05;  Stop 4/29/20 at 09:15;  Status DC


Potassium Acetate 65 meq/Magnesium Sulfate 20 meq/ Calcium Gluconate 10 meq/ 

Multivitamins 10 ml/Chromium/ Copper/Manganese/ Seleni/Zn 0.5 ml/ Insulin Human 

Regular 30 unit/ Total Parenteral Nutrition/Amino Acids/Dextrose/ Fat Emulsion 

Intravenous 1,920 ml @  80 mls/hr TPN  CONT IV  Last administered on 4/29/20at 

22:22;  Start 4/29/20 at 22:00;  Stop 4/30/20 at 21:59;  Status DC


Cyclobenzaprine HCl (Flexeril) 10 mg PRN Q6HRS  PRN PO MUSCLE SPASMS Last 

administered on 8/8/20at 20:50;  Start 4/30/20 at 10:45


Potassium Acetate 55 meq/Magnesium Sulfate 20 meq/ Calcium Gluconate 10 meq/ 

Multivitamins 10 ml/Chromium/ Copper/Manganese/ Seleni/Zn 0.5 ml/ Insulin Human 

Regular 30 unit/ Total Parenteral Nutrition/Amino Acids/Dextrose/ Fat Emulsion 

Intravenous 1,920 ml @  80 mls/hr TPN  CONT IV  Last administered on 5/1/20at 

01:00;  Start 4/30/20 at 22:00;  Stop 5/1/20 at 21:59;  Status DC


Magnesium Sulfate 50 ml @ 25 mls/hr 1X  ONCE IV  Last administered on 4/30/20at 

17:18;  Start 4/30/20 at 12:45;  Stop 4/30/20 at 14:44;  Status DC


Potassium Chloride/Water 100 ml @  100 mls/hr 1X  ONCE IV  Last administered on 

5/1/20at 11:27;  Start 5/1/20 at 12:00;  Stop 5/1/20 at 12:59;  Status DC


Hydromorphone HCl (Dilaudid Standard PCA) 12 mg STK-MED ONCE IV ;  Start 4/29/20

at 10:50;  Stop 5/1/20 at 11:02;  Status DC


Hydromorphone HCl (Dilaudid Standard PCA) 12 mg STK-MED ONCE IV ;  Start 4/30/20

at 13:47;  Stop 5/1/20 at 11:03;  Status DC


Potassium Acetate 30 meq/Magnesium Sulfate 20 meq/ Calcium Gluconate 10 meq/ 

Multivitamins 10 ml/Chromium/ Copper/Manganese/ Seleni/Zn 0.5 ml/ Insulin Human 

Regular 30 unit/ Potassium Chloride 30 meq/ Total Parenteral Nutrition/Amino 

Acids/Dextrose/ Fat Emulsion Intravenous 1,920 ml @  80 mls/hr TPN  CONT IV  

Last administered on 5/1/20at 22:34;  Start 5/1/20 at 22:00;  Stop 5/2/20 at 

21:59;  Status DC


Potassium Chloride/Water 100 ml @  100 mls/hr Q1H IV  Last administered on 

5/2/20at 13:05;  Start 5/2/20 at 07:00;  Stop 5/2/20 at 10:59;  Status DC


Magnesium Sulfate 50 ml @ 25 mls/hr 1X  ONCE IV  Last administered on 5/2/20at 

10:34;  Start 5/2/20 at 10:30;  Stop 5/2/20 at 12:29;  Status DC


Potassium Chloride 75 meq/ Magnesium Sulfate 20 meq/Calcium Gluconate 10 meq/ 

Multivitamins 10 ml/Chromium/ Copper/Manganese/ Seleni/Zn 0.5 ml/ Insulin Human 

Regular 30 unit/ Total Parenteral Nutrition/Amino Acids/Dextrose/ Fat Emulsion 

Intravenous 1,920 ml @  80 mls/hr TPN  CONT IV  Last administered on 5/2/20at 

21:51;  Start 5/2/20 at 22:00;  Stop 5/3/20 at 22:00;  Status DC


Potassium Chloride 75 meq/ Magnesium Sulfate 20 meq/Calcium Gluconate 10 meq/ 

Multivitamins 10 ml/Chromium/ Copper/Manganese/ Seleni/Zn 0.5 ml/ Insulin Human 

Regular 25 unit/ Total Parenteral Nutrition/Amino Acids/Dextrose/ Fat Emulsion 

Intravenous 1,920 ml @  80 mls/hr TPN  CONT IV  Last administered on 5/3/20at 

22:04;  Start 5/3/20 at 22:00;  Stop 5/4/20 at 21:59;  Status DC


Hydromorphone HCl (Dilaudid) 0.4 mg PRN Q4HRS  PRN IVP PAIN Last administered on

5/4/20at 10:57;  Start 5/4/20 at 09:00;  Stop 5/4/20 at 18:59;  Status DC


Micafungin Sodium 100 mg/Dextrose 100 ml @  100 mls/hr Q24H IV  Last 

administered on 5/26/20at 12:17;  Start 5/4/20 at 11:00;  Stop 5/27/20 at 09:59;

 Status DC


Daptomycin 485 mg/ Sodium Chloride 50 ml @  100 mls/hr Q24H IV  Last 

administered on 5/11/20at 13:10;  Start 5/4/20 at 11:00;  Stop 5/12/20 at 07:44;

 Status DC


Potassium Chloride 75 meq/ Magnesium Sulfate 15 meq/Calcium Gluconate 8 meq/ 

Multivitamins 10 ml/Chromium/ Copper/Manganese/ Seleni/Zn 0.5 ml/ Insulin Human 

Regular 25 unit/ Total Parenteral Nutrition/Amino Acids/Dextrose/ Fat Emulsion 

Intravenous 1,920 ml @  80 mls/hr TPN  CONT IV  Last administered on 5/4/20at 

23:08;  Start 5/4/20 at 22:00;  Stop 5/5/20 at 21:59;  Status DC


Haloperidol Lactate (Haldol Inj) 3 mg 1X  ONCE IVP  Last administered on 

5/4/20at 14:37;  Start 5/4/20 at 14:30;  Stop 5/4/20 at 14:31;  Status DC


Hydromorphone HCl (Dilaudid) 1 mg PRN Q4HRS  PRN IVP PAIN Last administered on 

5/18/20at 06:25;  Start 5/4/20 at 19:00;  Stop 5/18/20 at 17:10;  Status DC


Potassium Chloride 75 meq/ Magnesium Sulfate 15 meq/Calcium Gluconate 8 meq/ 

Multivitamins 10 ml/Chromium/ Copper/Manganese/ Seleni/Zn 0.5 ml/ Insulin Human 

Regular 20 unit/ Total Parenteral Nutrition/Amino Acids/Dextrose/ Fat Emulsion 

Intravenous 1,920 ml @  80 mls/hr TPN  CONT IV  Last administered on 5/5/20at 

22:10;  Start 5/5/20 at 22:00;  Stop 5/6/20 at 21:59;  Status DC


Lidocaine HCl (Buffered Lidocaine 1%) 3 ml STK-MED ONCE .ROUTE ;  Start 5/6/20 

at 11:31;  Stop 5/6/20 at 11:31;  Status DC


Lidocaine HCl (Buffered Lidocaine 1%) 3 ml STK-MED ONCE .ROUTE ;  Start 5/6/20 

at 12:28;  Stop 5/6/20 at 12:29;  Status DC


Lidocaine HCl (Buffered Lidocaine 1%) 6 ml 1X  ONCE INJ  Last administered on 

5/6/20at 12:53;  Start 5/6/20 at 12:45;  Stop 5/6/20 at 12:46;  Status DC


Potassium Chloride 75 meq/ Magnesium Sulfate 15 meq/Calcium Gluconate 8 meq/ 

Multivitamins 10 ml/Chromium/ Copper/Manganese/ Seleni/Zn 0.5 ml/ Insulin Human 

Regular 20 unit/ Total Parenteral Nutrition/Amino Acids/Dextrose/ Fat Emulsion 

Intravenous 1,920 ml @  80 mls/hr TPN  CONT IV  Last administered on 5/6/20at 

22:00;  Start 5/6/20 at 22:00;  Stop 5/7/20 at 21:59;  Status DC


Potassium Chloride 75 meq/ Magnesium Sulfate 15 meq/Calcium Gluconate 8 meq/ Mu

ltivitamins 10 ml/Chromium/ Copper/Manganese/ Seleni/Zn 0.5 ml/ Insulin Human 

Regular 15 unit/ Total Parenteral Nutrition/Amino Acids/Dextrose/ Fat Emulsion 

Intravenous 1,920 ml @  80 mls/hr TPN  CONT IV  Last administered on 5/7/20at 

22:28;  Start 5/7/20 at 22:00;  Stop 5/8/20 at 21:59;  Status DC


Vecuronium Bromide (Norcuron Bolus) 6 mg PRN Q6HRS  PRN IV VENT ASYNCHRONY;  

Start 5/7/20 at 19:15;  Stop 5/7/20 at 19:35;  Status DC


Bumetanide (Bumex) 2 mg 1X  ONCE IV  Last administered on 5/7/20at 22:09;  Start

5/7/20 at 19:45;  Stop 5/7/20 at 19:46;  Status DC


Lidocaine HCl (Buffered Lidocaine 1%) 3 ml STK-MED ONCE .ROUTE ;  Start 5/8/20 

at 07:59;  Stop 5/8/20 at 07:59;  Status DC


Midazolam HCl (Versed) 5 mg STK-MED ONCE .ROUTE ;  Start 5/8/20 at 08:36;  Stop 

5/8/20 at 08:36;  Status DC


Fentanyl Citrate (Fentanyl 5ml Vial) 250 mcg STK-MED ONCE .ROUTE ;  Start 5/8/20

at 08:36;  Stop 5/8/20 at 08:37;  Status DC


Lidocaine HCl (Buffered Lidocaine 1%) 3 ml 1X  ONCE IJ  Last administered on 

5/8/20at 09:30;  Start 5/8/20 at 09:15;  Stop 5/8/20 at 09:16;  Status DC


Midazolam HCl (Versed) 5 mg 1X  ONCE IV  Last administered on 5/8/20at 09:30;  

Start 5/8/20 at 09:15;  Stop 5/8/20 at 09:16;  Status DC


Fentanyl Citrate (Fentanyl 5ml Vial) 250 mcg 1X  ONCE IV  Last administered on 

5/8/20at 09:30;  Start 5/8/20 at 09:15;  Stop 5/8/20 at 09:16;  Status DC


Bumetanide (Bumex) 2 mg DAILY IV  Last administered on 5/18/20at 08:07;  Start 

5/8/20 at 10:00;  Stop 5/18/20 at 17:15;  Status DC


Potassium Chloride 75 meq/ Magnesium Sulfate 15 meq/ Multivitamins 10 

ml/Chromium/ Copper/Manganese/ Seleni/Zn 0.5 ml/ Insulin Human Regular 15 unit/ 

Total Parenteral Nutrition/Amino Acids/Dextrose/ Fat Emulsion Intravenous 1,920 

ml @  80 mls/hr TPN  CONT IV  Last administered on 5/8/20at 21:59;  Start 5/8/20

at 22:00;  Stop 5/9/20 at 21:59;  Status DC


Metoclopramide HCl (Reglan Vial) 10 mg PRN Q3HRS  PRN IVP NAUSEA/VOMITING-3rd 

choice Last administered on 5/14/20at 04:25;  Start 5/9/20 at 16:45


Potassium Chloride 75 meq/ Magnesium Sulfate 15 meq/ Multivitamins 10 

ml/Chromium/ Copper/Manganese/ Seleni/Zn 0.5 ml/ Insulin Human Regular 15 unit/ 

Total Parenteral Nutrition/Amino Acids/Dextrose/ Fat Emulsion Intravenous 1,920 

ml @  80 mls/hr TPN  CONT IV  Last administered on 5/9/20at 22:41;  Start 5/9/20

at 22:00;  Stop 5/10/20 at 21:59;  Status DC


Magnesium Sulfate 50 ml @ 25 mls/hr 1X  ONCE IV  Last administered on 5/10/20at 

10:44;  Start 5/10/20 at 09:00;  Stop 5/10/20 at 10:59;  Status DC


Potassium Chloride/Water 100 ml @  100 mls/hr 1X  ONCE IV  Last administered on 

5/10/20at 09:37;  Start 5/10/20 at 09:00;  Stop 5/10/20 at 09:59;  Status DC


Duloxetine HCl (Cymbalta) 30 mg DAILY PO  Last administered on 5/11/20at 09:48; 

Start 5/10/20 at 14:00;  Stop 5/13/20 at 10:25;  Status DC


Potassium Chloride 80 meq/ Magnesium Sulfate 20 meq/ Multivitamins 10 

ml/Chromium/ Copper/Manganese/ Seleni/Zn 0.5 ml/ Insulin Human Regular 15 unit/ 

Total Parenteral Nutrition/Amino Acids/Dextrose/ Fat Emulsion Intravenous 1,920 

ml @  80 mls/hr TPN  CONT IV  Last administered on 5/10/20at 21:42;  Start 

5/10/20 at 22:00;  Stop 5/11/20 at 21:59;  Status DC


Potassium Chloride 80 meq/ Magnesium Sulfate 20 meq/ Multivitamins 10 

ml/Chromium/ Copper/Manganese/ Seleni/Zn 0.5 ml/ Insulin Human Regular 15 unit/ 

Total Parenteral Nutrition/Amino Acids/Dextrose/ Fat Emulsion Intravenous 1,920 

ml @  80 mls/hr TPN  CONT IV  Last administered on 5/11/20at 22:20;  Start 

5/11/20 at 22:00;  Stop 5/12/20 at 21:59;  Status DC


Lidocaine HCl (Buffered Lidocaine 1%) 3 ml STK-MED ONCE .ROUTE ;  Start 5/12/20 

at 09:54;  Stop 5/12/20 at 09:55;  Status DC


Hydromorphone HCl (Dilaudid Standard PCA) 12 mg STK-MED ONCE IV ;  Start 5/1/20 

at 15:50;  Stop 5/12/20 at 11:24;  Status DC


Potassium Chloride 80 meq/ Magnesium Sulfate 20 meq/ Multivitamins 10 ml/Chromiu

m/ Copper/Manganese/ Seleni/Zn 0.5 ml/ Insulin Human Regular 15 unit/ Total 

Parenteral Nutrition/Amino Acids/Dextrose/ Fat Emulsion Intravenous 1,920 ml @  

80 mls/hr TPN  CONT IV  Last administered on 5/12/20at 21:40;  Start 5/12/20 at 

22:00;  Stop 5/13/20 at 21:59;  Status DC


Lidocaine HCl (Buffered Lidocaine 1%) 6 ml 1X  ONCE INJ  Last administered on 

5/12/20at 14:15;  Start 5/12/20 at 14:15;  Stop 5/12/20 at 14:16;  Status DC


Potassium Chloride 80 meq/ Magnesium Sulfate 20 meq/ Multivitamins 10 ml/Chromi

um/ Copper/Manganese/ Seleni/Zn 1 ml/ Insulin Human Regular 15 unit/ Total 

Parenteral Nutrition/Amino Acids/Dextrose/ Fat Emulsion Intravenous 1,920 ml @  

80 mls/hr TPN  CONT IV  Last administered on 5/13/20at 22:04;  Start 5/13/20 at 

22:00;  Stop 5/14/20 at 21:59;  Status DC


Potassium Chloride/Water 100 ml @  100 mls/hr 1X  ONCE IV  Last administered on 

5/14/20at 11:34;  Start 5/14/20 at 11:00;  Stop 5/14/20 at 11:59;  Status DC


Potassium Chloride 90 meq/ Magnesium Sulfate 20 meq/ Multivitamins 10 ml/Chrom

ium/ Copper/Manganese/ Seleni/Zn 1 ml/ Insulin Human Regular 15 unit/ Total 

Parenteral Nutrition/Amino Acids/Dextrose/ Fat Emulsion Intravenous 1,920 ml @  

80 mls/hr TPN  CONT IV  Last administered on 5/14/20at 22:57;  Start 5/14/20 at 

22:00;  Stop 5/15/20 at 21:59;  Status DC


Potassium Chloride 90 meq/ Magnesium Sulfate 20 meq/ Multivitamins 10 

ml/Chromium/ Copper/Manganese/ Seleni/Zn 1 ml/ Insulin Human Regular 15 unit/ 

Total Parenteral Nutrition/Amino Acids/Dextrose/ Fat Emulsion Intravenous 1,920 

ml @  80 mls/hr TPN  CONT IV  Last administered on 5/15/20at 22:48;  Start 

5/15/20 at 22:00;  Stop 5/16/20 at 21:59;  Status DC


Potassium Chloride 90 meq/ Magnesium Sulfate 20 meq/ Multivitamins 10 

ml/Chromium/ Copper/Manganese/ Seleni/Zn 1 ml/ Insulin Human Regular 15 unit/ 

Total Parenteral Nutrition/Amino Acids/Dextrose/ Fat Emulsion Intravenous 1,890 

ml @  78.75 mls/ hr TPN  CONT IV  Last administered on 5/16/20at 22:15;  Start 

5/16/20 at 22:00;  Stop 5/17/20 at 21:59;  Status DC


Linezolid/Dextrose 300 ml @  300 mls/hr Q12HR IV  Last administered on 5/19/20at

21:08;  Start 5/17/20 at 09:00;  Stop 5/20/20 at 08:11;  Status DC


Daptomycin 450 mg/ Sodium Chloride 50 ml @  100 mls/hr Q24H IV  Last 

administered on 5/20/20at 09:25;  Start 5/17/20 at 09:00;  Stop 5/21/20 at 

08:30;  Status DC


Potassium Chloride 90 meq/ Magnesium Sulfate 20 meq/ Multivitamins 10 

ml/Chromium/ Copper/Manganese/ Seleni/Zn 1 ml/ Insulin Human Regular 15 unit/ 

Total Parenteral Nutrition/Amino Acids/Dextrose/ Fat Emulsion Intravenous 1,890 

ml @  78.75 mls/ hr TPN  CONT IV  Last administered on 5/17/20at 21:34;  Start 

5/17/20 at 22:00;  Stop 5/18/20 at 21:59;  Status DC


Lorazepam (Ativan Inj) 2 mg STK-MED ONCE .ROUTE ;  Start 5/17/20 at 14:58;  Stop

5/17/20 at 14:58;  Status DC


Metoprolol Tartrate (Lopressor Vial) 5 mg 1X  ONCE IVP  Last administered on 

5/17/20at 15:31;  Start 5/17/20 at 15:15;  Stop 5/17/20 at 15:16;  Status DC


Lorazepam (Ativan Inj) 2 mg 1X  ONCE IVP  Last administered on 5/17/20at 15:30; 

Start 5/17/20 at 15:15;  Stop 5/17/20 at 15:16;  Status DC


Enoxaparin Sodium (Lovenox 40mg Syringe) 40 mg Q24H SQ  Last administered on 

6/5/20at 17:44;  Start 5/17/20 at 17:00;  Stop 6/7/20 at 06:50;  Status DC


Lorazepam (Ativan Inj) 1 mg PRN Q4HRS  PRN IVP ANXIETY / AGITATION MILD-MOD Last

administered on 5/31/20at 15:55;  Start 5/17/20 at 19:15;  Stop 6/2/20 at 11:45;

 Status DC


Lorazepam (Ativan Inj) 2 mg PRN Q4HRS  PRN IVP ANXIETY / AGITATION SEVERE Last 

administered on 6/1/20at 07:55;  Start 5/17/20 at 19:15;  Stop 6/2/20 at 11:45; 

Status DC


Fentanyl Citrate (Fentanyl 2ml Vial) 50 mcg PRN Q4HRS  PRN IVP SEVERE PAIN Last 

administered on 6/13/20at 05:15;  Start 5/18/20 at 13:15;  Stop 6/14/20 at 

09:29;  Status DC


Fentanyl Citrate (Fentanyl 2ml Vial) 25 mcg PRN Q4HRS  PRN IVP MODERATE PAIN Las

t administered on 6/13/20at 00:27;  Start 5/18/20 at 13:15;  Stop 6/14/20 at 

09:30;  Status DC


Potassium Chloride 90 meq/ Magnesium Sulfate 20 meq/ Multivitamins 10 

ml/Chromium/ Copper/Manganese/ Seleni/Zn 1 ml/ Insulin Human Regular 15 unit/ 

Total Parenteral Nutrition/Amino Acids/Dextrose/ Fat Emulsion Intravenous 1,890 

ml @  78.75 mls/ hr TPN  CONT IV  Last administered on 5/18/20at 22:18;  Start 

5/18/20 at 22:00;  Stop 5/19/20 at 21:59;  Status DC


Furosemide (Lasix) 40 mg 1X  ONCE IVP  Last administered on 5/18/20at 21:51;  

Start 5/18/20 at 21:45;  Stop 5/18/20 at 21:48;  Status DC


Albumin Human 100 ml @  100 mls/hr 1X PRN  PRN IV SEE COMMENTS;  Start 5/19/20 

at 01:30;  Stop 8/3/20 at 09:52;  Status DC


Furosemide (Lasix) 40 mg [id number] IVP  Last administered on 6/3/20at 08:04;  Start 

5/19/20 at 14:00;  Stop 6/3/20 at 13:07;  Status DC


Potassium Chloride 90 meq/ Magnesium Sulfate 20 meq/ Multivitamins 10 

ml/Chromium/ Copper/Manganese/ Seleni/Zn 1 ml/ Insulin Human Regular 15 unit/ 

Total Parenteral Nutrition/Amino Acids/Dextrose/ Fat Emulsion Intravenous 1,800 

ml @  75 mls/hr TPN  CONT IV  Last administered on 5/19/20at 22:31;  Start 5/ 19/20 at 22:00;  Stop 5/20/20 at 21:59;  Status DC


Potassium Chloride 90 meq/ Magnesium Sulfate 20 meq/ Multivitamins 10 ml/

mium/ Copper/Manganese/ Seleni/Zn 1 ml/ Insulin Human Regular 15 unit/ Total 

Parenteral Nutrition/Amino Acids/Dextrose/ Fat Emulsion Intravenous 1,800 ml @  

75 mls/hr TPN  CONT IV  Last administered on 5/20/20at 22:28;  Start 5/20/20 at 

22:00;  Stop 5/21/20 at 21:59;  Status DC


Potassium Chloride 110 meq/ Magnesium Sulfate 20 meq/ Multivitamins 10 

ml/Chromium/ Copper/Manganese/ Seleni/Zn 1 ml/ Insulin Human Regular 15 unit/ 

Total Parenteral Nutrition/Amino Acids/Dextrose/ Fat Emulsion Intravenous 1,800 

ml @  75 mls/hr TPN  CONT IV  Last administered on 5/21/20at 22:01;  Start 

5/21/20 at 22:00;  Stop 5/22/20 at 21:59;  Status DC


Saliva Substitute (Biotene Moisturizing Mouth) 2 spray PRN Q15MIN  PRN PO DRY 

MOUTH;  Start 5/21/20 at 11:00


Potassium Chloride 110 meq/ Magnesium Sulfate 20 meq/ Multivitamins 10 

ml/Chromium/ Copper/Manganese/ Seleni/Zn 1 ml/ Insulin Human Regular 15 unit/ To

chely Parenteral Nutrition/Amino Acids/Dextrose/ Fat Emulsion Intravenous 1,800 ml

@  75 mls/hr TPN  CONT IV  Last administered on 5/22/20at 22:21;  Start 5/22/20 

at 22:00;  Stop 5/23/20 at 21:59;  Status DC


Potassium Chloride 110 meq/ Magnesium Sulfate 20 meq/ Multivitamins 10 

ml/Chromium/ Copper/Manganese/ Seleni/Zn 1 ml/ Insulin Human Regular 15 unit/ 

Total Parenteral Nutrition/Amino Acids/Dextrose/ Fat Emulsion Intravenous 1,800 

ml @  75 mls/hr TPN  CONT IV  Last administered on 5/23/20at 22:04;  Start 5/ 23/20 at 22:00;  Stop 5/24/20 at 21:59;  Status DC


Potassium Chloride 110 meq/ Magnesium Sulfate 20 meq/ Multivitamins 10 ml/Chr

omium/ Copper/Manganese/ Seleni/Zn 1 ml/ Insulin Human Regular 15 unit/ Total 

Parenteral Nutrition/Amino Acids/Dextrose/ Fat Emulsion Intravenous 1,800 ml @  

75 mls/hr TPN  CONT IV  Last administered on 5/24/20at 22:48;  Start 5/24/20 at 

22:00;  Stop 5/25/20 at 21:59;  Status DC


Potassium Chloride 70 meq/ Magnesium Sulfate 20 meq/ Multivitamins 10 

ml/Chromium/ Copper/Manganese/ Seleni/Zn 1 ml/ Insulin Human Regular 15 unit/ 

Total Parenteral Nutrition/Amino Acids/Dextrose/ Fat Emulsion Intravenous 1,800 

ml @  75 mls/hr TPN  CONT IV  Last administered on 5/25/20at 21:39;  Start 

5/25/20 at 22:00;  Stop 5/26/20 at 21:59;  Status DC


Meropenem 500 mg/ Sodium Chloride 50 ml @  100 mls/hr Q6HRS IV  Last 

administered on 5/27/20at 06:02;  Start 5/25/20 at 18:00;  Stop 5/27/20 at 

09:59;  Status DC


Barium Sulfate (Varibar Thin Liquid Apple) 148 gm 1X  ONCE PO ;  Start 5/26/20 

at 11:45;  Stop 5/26/20 at 11:49;  Status DC


Potassium Chloride 70 meq/ Magnesium Sulfate 20 meq/ Multivitamins 10 

ml/Chromium/ Copper/Manganese/ Seleni/Zn 1 ml/ Insulin Human Regular 15 unit/ 

Total Parenteral Nutrition/Amino Acids/Dextrose/ Fat Emulsion Intravenous 1,800 

ml @  75 mls/hr TPN  CONT IV  Last administered on 5/26/20at 22:27;  Start 

5/26/20 at 22:00;  Stop 5/27/20 at 21:59;  Status DC


Piperacillin Sod/ Tazobactam Sod 3.375 gm/Sodium Chloride 50 ml @  100 mls/hr 

Q6HRS IV  Last administered on 6/4/20at 06:10;  Start 5/27/20 at 12:00;  Stop 

6/4/20 at 07:26;  Status DC


Potassium Chloride 70 meq/ Magnesium Sulfate 20 meq/ Multivitamins 10 ml/C

hromium/ Copper/Manganese/ Seleni/Zn 1 ml/ Insulin Human Regular 15 unit/ Total 

Parenteral Nutrition/Amino Acids/Dextrose/ Fat Emulsion Intravenous 1,800 ml @  

75 mls/hr TPN  CONT IV  Last administered on 5/27/20at 22:03;  Start 5/27/20 at 

22:00;  Stop 5/28/20 at 21:59;  Status DC


Potassium Chloride 70 meq/ Magnesium Sulfate 20 meq/ Multivitamins 10 

ml/Chromium/ Copper/Manganese/ Seleni/Zn 1 ml/ Insulin Human Regular 15 unit/ 

Total Parenteral Nutrition/Amino Acids/Dextrose/ Fat Emulsion Intravenous 1,800 

ml @  75 mls/hr TPN  CONT IV  Last administered on 5/28/20at 22:33;  Start 

5/28/20 at 22:00;  Stop 5/29/20 at 21:59;  Status DC


Potassium Chloride 70 meq/ Magnesium Sulfate 20 meq/ Multivitamins 10 

ml/Chromium/ Copper/Manganese/ Seleni/Zn 1 ml/ Insulin Human Regular 15 unit/ 

Total Parenteral Nutrition/Amino Acids/Dextrose/ Fat Emulsion Intravenous 1,800 

ml @  75 mls/hr TPN  CONT IV  Last administered on 5/29/20at 23:13;  Start 

5/29/20 at 22:00;  Stop 5/30/20 at 21:59;  Status DC


Potassium Chloride 80 meq/ Magnesium Sulfate 20 meq/ Multivitamins 10 

ml/Chromium/ Copper/Manganese/ Seleni/Zn 1 ml/ Insulin Human Regular 15 unit/ 

Total Parenteral Nutrition/Amino Acids/Dextrose/ Fat Emulsion Intravenous 1,800 

ml @  75 mls/hr TPN  CONT IV  Last administered on 5/30/20at 22:30;  Start 

5/30/20 at 22:00;  Stop 5/31/20 at 21:59;  Status DC


Potassium Chloride 80 meq/ Magnesium Sulfate 20 meq/ Multivitamins 10 

ml/Chromium/ Copper/Manganese/ Seleni/Zn 1 ml/ Insulin Human Regular 15 unit/ 

Total Parenteral Nutrition/Amino Acids/Dextrose/ Fat Emulsion Intravenous 1,800 

ml @  75 mls/hr TPN  CONT IV  Last administered on 5/31/20at 21:54;  Start 

5/31/20 at 22:00;  Stop 6/1/20 at 21:59;  Status DC


Potassium Chloride/Water 100 ml @  100 mls/hr 1X  ONCE IV  Last administered on 

6/1/20at 10:15;  Start 6/1/20 at 10:00;  Stop 6/1/20 at 10:59;  Status DC


Potassium Chloride 90 meq/ Magnesium Sulfate 20 meq/ Multivitamins 10 

ml/Chromium/ Copper/Manganese/ Seleni/Zn 1 ml/ Insulin Human Regular 20 unit/ 

Total Parenteral Nutrition/Amino Acids/Dextrose/ Fat Emulsion Intravenous 1,800 

ml @  75 mls/hr TPN  CONT IV  Last administered on 6/1/20at 22:28;  Start 6/1/20

at 22:00;  Stop 6/2/20 at 21:59;  Status DC


Potassium Chloride 90 meq/ Magnesium Sulfate 20 meq/ Multivitamins 10 

ml/Chromium/ Copper/Manganese/ Seleni/Zn 1 ml/ Insulin Human Regular 20 unit/ 

Total Parenteral Nutrition/Amino Acids/Dextrose/ Fat Emulsion Intravenous 1,800 

ml @  75 mls/hr TPN  CONT IV  Last administered on 6/2/20at 22:08;  Start 6/2/20

at 22:00;  Stop 6/3/20 at 21:59;  Status DC


Lorazepam (Ativan Inj) 0.25 mg PRN Q4HRS  PRN IVP ANXIETY / AGITATION Last 

administered on 8/9/20at 23:15;  Start 6/3/20 at 07:30


Potassium Chloride 90 meq/ Magnesium Sulfate 20 meq/ Multivitamins 10 

ml/Chromium/ Copper/Manganese/ Seleni/Zn 1 ml/ Insulin Human Regular 20 unit/ 

Total Parenteral Nutrition/Amino Acids/Dextrose/ Fat Emulsion Intravenous 1,800 

ml @  75 mls/hr TPN  CONT IV  Last administered on 6/3/20at 23:13;  Start 6/3/20

at 22:00;  Stop 6/4/20 at 21:59;  Status DC


Furosemide (Lasix) 40 mg DAILY IVP  Last administered on 6/5/20at 11:14;  Start 

6/3/20 at 13:30;  Stop 6/7/20 at 09:12;  Status DC


Fluoxetine HCl (PROzac) 20 mg QHS PEG  Last administered on 8/9/20at 20:07;  

Start 6/4/20 at 21:00


Fentanyl (Duragesic 50mcg/ Hr Patch) 1 patch Q72H TD  Last administered on 

6/4/20at 21:22;  Start 6/4/20 at 21:00;  Stop 6/13/20 at 12:00;  Status DC


Potassium Chloride 40 meq/ Potassium Acetate 60 meq/Magnesium Sulfate 10 meq/ 

Multivitamins 10 ml/Chromium/ Copper/Manganese/ Seleni/Zn 1 ml/ Insulin Human 

Regular 20 unit/ Total Parenteral Nutrition/Amino Acids/Dextrose/ Fat Emulsion 

Intravenous 1,800 ml @  75 mls/hr TPN  CONT IV  Last administered on 6/5/20at 

00:03;  Start 6/4/20 at 22:00;  Stop 6/5/20 at 21:59;  Status DC


Potassium Acetate 80 meq/Magnesium Sulfate 5 meq/ Multivitamins 10 ml/Chromium/ 

Copper/Manganese/ Seleni/Zn 1 ml/ Insulin Human Regular 20 unit/ Total 

Parenteral Nutrition/Amino Acids/Dextrose/ Fat Emulsion Intravenous 1,920 ml @  

80 mls/hr TPN  CONT IV  Last administered on 6/5/20at 21:59;  Start 6/5/20 at 

22:00;  Stop 6/6/20 at 21:59;  Status DC


Potassium Acetate 60 meq/Magnesium Sulfate 5 meq/ Multivitamins 10 ml/Chromium/ 

Copper/Manganese/ Seleni/Zn 1 ml/ Insulin Human Regular 30 unit/ Total 

Parenteral Nutrition/Amino Acids/Dextrose/ Fat Emulsion Intravenous 1,920 ml @  

80 mls/hr TPN  CONT IV  Last administered on 6/6/20at 21:54;  Start 6/6/20 at 

22:00;  Stop 6/7/20 at 21:59;  Status DC


Norepinephrine Bitartrate 8 mg/ Dextrose 258 ml @  13.332 mls/ hr CONT  PRN IV 

PER PROTOCOL Last administered on 7/2/20at 09:09;  Start 6/7/20 at 06:30;  Stop 

8/3/20 at 09:45;  Status DC


Albumin Human 500 ml @  125 mls/hr 1X  ONCE IV  Last administered on 6/7/20at 

08:10;  Start 6/7/20 at 08:15;  Stop 6/7/20 at 12:14;  Status DC


Potassium Acetate 40 meq/Magnesium Sulfate 5 meq/ Multivitamins 10 ml/Chromium/ 

Copper/Manganese/ Seleni/Zn 1 ml/ Insulin Human Regular 30 unit/ Total Paren

teral Nutrition/Amino Acids/Dextrose/ Fat Emulsion Intravenous 1,920 ml @  80 

mls/hr TPN  CONT IV  Last administered on 6/7/20at 22:23;  Start 6/7/20 at 

22:00;  Stop 6/8/20 at 21:59;  Status DC


Meropenem 1 gm/ Sodium Chloride 100 ml @  200 mls/hr Q8HRS IV ;  Start 6/7/20 at

14:00;  Status Cancel


Meropenem 1 gm/ Sodium Chloride 100 ml @  200 mls/hr Q8HRS IV  Last administered

on 6/7/20at 11:04;  Start 6/7/20 at 10:00;  Stop 6/7/20 at 13:00;  Status DC


Meropenem 1 gm/ Sodium Chloride 100 ml @  200 mls/hr Q12HR IV  Last administered

on 6/25/20at 08:27;  Start 6/7/20 at 21:00;  Stop 6/25/20 at 08:56;  Status DC


Sodium Chloride 1,000 ml @  1,000 mls/hr 1X  ONCE IV  Last administered on 

6/7/20at 11:06;  Start 6/7/20 at 10:45;  Stop 6/7/20 at 11:44;  Status DC


Micafungin Sodium 100 mg/Dextrose 100 ml @  100 mls/hr Q24H IV  Last 

administered on 6/24/20at 12:34;  Start 6/7/20 at 11:00;  Stop 6/25/20 at 08:56;

 Status DC


Daptomycin 410 mg/ Sodium Chloride 50 ml @  100 mls/hr Q24H IV  Last 

administered on 6/9/20at 13:33;  Start 6/7/20 at 14:00;  Stop 6/10/20 at 08:30; 

Status DC


Midazolam HCl (Versed) 2 mg STK-MED ONCE .ROUTE ;  Start 6/7/20 at 14:47;  Stop 

6/7/20 at 14:48;  Status DC


Fentanyl Citrate (Fentanyl 2ml Vial) 100 mcg STK-MED ONCE .ROUTE ;  Start 6/7/20

at 14:47;  Stop 6/7/20 at 14:48;  Status DC


Flumazenil (Romazicon) 0.5 mg STK-MED ONCE IV ;  Start 6/7/20 at 14:48;  Stop 

6/7/20 at 14:48;  Status DC


Naloxone HCl (Narcan) 0.4 mg STK-MED ONCE .ROUTE ;  Start 6/7/20 at 14:48;  Stop

6/7/20 at 14:48;  Status DC


Lidocaine HCl (Lidocaine 1% 20ml Vial) 20 ml STK-MED ONCE .ROUTE ;  Start 6/7/20

at 14:48;  Stop 6/7/20 at 14:48;  Status DC


Midazolam HCl (Versed) 2 mg 1X  ONCE IV  Last administered on 6/7/20at 15:28;  

Start 6/7/20 at 15:00;  Stop 6/7/20 at 15:01;  Status DC


Fentanyl Citrate (Fentanyl 2ml Vial) 100 mcg 1X  ONCE IV  Last administered on 

6/7/20at 15:28;  Start 6/7/20 at 15:00;  Stop 6/7/20 at 15:01;  Status DC


Lidocaine HCl (Lidocaine 1% 20ml Vial) 20 ml 1X  ONCE INJ  Last administered on 

6/7/20at 15:30;  Start 6/7/20 at 15:00;  Stop 6/7/20 at 15:01;  Status DC


Sodium Chloride 1,000 ml @  100 mls/hr Q10H IV  Last administered on 6/16/20at 

07:30;  Start 6/7/20 at 20:00;  Stop 6/16/20 at 11:26;  Status DC


Sodium Bicarbonate (Sodium Bicarb Adult 8.4% Syr) 50 meq 1X  ONCE IV  Last 

administered on 6/7/20at 21:47;  Start 6/7/20 at 22:00;  Stop 6/7/20 at 22:01;  

Status DC


Potassium Acetate 40 meq/Magnesium Sulfate 5 meq/ Multivitamins 10 ml/Chromium/ 

Copper/Manganese/ Seleni/Zn 1 ml/ Insulin Human Regular 30 unit/ Total 

Parenteral Nutrition/Amino Acids/Dextrose/ Fat Emulsion Intravenous 1,920 ml @  

80 mls/hr TPN  CONT IV  Last administered on 6/8/20at 22:28;  Start 6/8/20 at 

22:00;  Stop 6/9/20 at 21:59;  Status DC


Sodium Chloride 500 ml @  500 mls/hr 1X  ONCE IV  Last administered on 6/9/20at 

06:39;  Start 6/9/20 at 06:45;  Stop 6/9/20 at 07:44;  Status DC


Potassium Acetate 40 meq/Magnesium Sulfate 5 meq/ Multivitamins 10 ml/Chromium/ 

Copper/Manganese/ Seleni/Zn 1 ml/ Insulin Human Regular 30 unit/ Total 

Parenteral Nutrition/Amino Acids/Dextrose/ Fat Emulsion Intravenous 1,920 ml @  

80 mls/hr TPN  CONT IV  Last administered on 6/9/20at 22:03;  Start 6/9/20 at 

22:00;  Stop 6/10/20 at 21:59;  Status DC


Metoprolol Tartrate (Lopressor Vial) 5 mg PRN Q6HRS  PRN IVP HYPERTENSION Last 

administered on 8/10/20at 01:47;  Start 6/10/20 at 09:00


Potassium Acetate 40 meq/Magnesium Sulfate 5 meq/ Multivitamins 10 ml/Chromium/ 

Copper/Manganese/ Seleni/Zn 1 ml/ Insulin Human Regular 30 unit/ Total 

Parenteral Nutrition/Amino Acids/Dextrose/ Fat Emulsion Intravenous 1,920 ml @  

80 mls/hr TPN  CONT IV  Last administered on 6/10/20at 21:26;  Start 6/10/20 at 

22:00;  Stop 6/11/20 at 21:59;  Status DC


Potassium Acetate 40 meq/Magnesium Sulfate 5 meq/ Multivitamins 10 ml/Chromium/ 

Copper/Manganese/ Seleni/Zn 1 ml/ Insulin Human Regular 30 unit/ Total 

Parenteral Nutrition/Amino Acids/Dextrose/ Fat Emulsion Intravenous 1,920 ml @  

80 mls/hr TPN  CONT IV  Last administered on 6/11/20at 23:23;  Start 6/11/20 at 

22:00;  Stop 6/12/20 at 21:59;  Status DC


Potassium Acetate 40 meq/Magnesium Sulfate 5 meq/ Multivitamins 10 ml/Chromium/ 

Copper/Manganese/ Seleni/Zn 1 ml/ Insulin Human Regular 30 unit/ Total 

Parenteral Nutrition/Amino Acids/Dextrose/ Fat Emulsion Intravenous 1,920 ml @  

80 mls/hr TPN  CONT IV  Last administered on 6/12/20at 21:35;  Start 6/12/20 at 

22:00;  Stop 6/13/20 at 21:59;  Status DC


Furosemide (Lasix) 20 mg 1X  ONCE IVP  Last administered on 6/13/20at 06:26;  

Start 6/13/20 at 06:15;  Stop 6/13/20 at 06:16;  Status DC


Methylprednisolone Sodium Succinate (SOLU-Medrol 125MG VIAL) 125 mg 1X  ONCE IV 

Last administered on 6/13/20at 06:26;  Start 6/13/20 at 06:15;  Stop 6/13/20 at 

06:16;  Status DC


Albuterol/ Ipratropium (Duoneb) 3 ml Q4HRS NEB  Last administered on 8/10/20at 

08:42;  Start 6/13/20 at 08:00


Fentanyl Citrate 30 ml @ 0 mls/hr CONT  PRN IV SEE PROTOCOL Last administered on

7/4/20at 08:03;  Start 6/13/20 at 06:00;  Stop 7/4/20 at 12:42;  Status DC


Propofol 100 ml @ 0 mls/hr CONT  PRN IV SEE PROTOCOL Last administered on 

6/20/20at 23:50;  Start 6/13/20 at 06:00;  Stop 8/3/20 at 09:45;  Status DC


Fentanyl Citrate (Fentanyl 2ml Vial) 25 mcg PRN Q1HR  PRN IV SEE COMMENTS Last 

administered on 8/1/20at 23:56;  Start 6/13/20 at 06:00;  Stop 8/3/20 at 09:45; 

Status DC


Fentanyl Citrate (Fentanyl 2ml Vial) 50 mcg PRN Q1HR  PRN IV SEE COMMENTS Last 

administered on 8/3/20at 09:39;  Start 6/13/20 at 06:00;  Stop 8/3/20 at 09:45; 

Status DC


Chlorhexidine Gluconate (Peridex) 15 ml BID MM ;  Start 6/13/20 at 09:00;  Stop 

6/13/20 at 07:58;  Status DC


Potassium Acetate 40 meq/Magnesium Sulfate 5 meq/ Multivitamins 10 ml/Chromium/ 

Copper/Manganese/ Seleni/Zn 1 ml/ Insulin Human Regular 30 unit/ Total 

Parenteral Nutrition/Amino Acids/Dextrose/ Fat Emulsion Intravenous 1,920 ml @  

80 mls/hr TPN  CONT IV  Last administered on 6/13/20at 21:19;  Start 6/13/20 at 

22:00;  Stop 6/14/20 at 21:59;  Status DC


Acetylcysteine (Mucomyst 20% Resp Treatment) 600 mg BID NEB  Last administered 

on 6/19/20at 09:33;  Start 6/13/20 at 21:00;  Stop 6/19/20 at 10:39;  Status DC


Magnesium Sulfate 100 ml @  25 mls/hr 1X  ONCE IV  Last administered on 

6/13/20at 15:48;  Start 6/13/20 at 15:45;  Stop 6/13/20 at 19:44;  Status DC


Potassium Acetate 40 meq/Magnesium Sulfate 5 meq/ Multivitamins 10 ml/Chromium/ 

Copper/Manganese/ Seleni/Zn 1 ml/ Insulin Human Regular 30 unit/ Total 

Parenteral Nutrition/Amino Acids/Dextrose/ Fat Emulsion Intravenous 1,920 ml @  

80 mls/hr TPN  CONT IV  Last administered on 6/14/20at 21:35;  Start 6/14/20 at 

22:00;  Stop 6/15/20 at 21:59;  Status DC


Potassium Chloride/Water 100 ml @  100 mls/hr Q1H IV  Last administered on 

6/15/20at 08:31;  Start 6/15/20 at 07:00;  Stop 6/15/20 at 08:59;  Status DC


Potassium Acetate 40 meq/Magnesium Sulfate 5 meq/ Multivitamins 10 ml/Chromium/ 

Copper/Manganese/ Seleni/Zn 1 ml/ Insulin Human Regular 30 unit/ Total 

Parenteral Nutrition/Amino Acids/Dextrose/ Fat Emulsion Intravenous 1,920 ml @  

80 mls/hr TPN  CONT IV  Last administered on 6/15/20at 21:54;  Start 6/15/20 at 

22:00;  Stop 6/16/20 at 19:34;  Status DC


Lidocaine HCl (Buffered Lidocaine 1%) 3 ml STK-MED ONCE .ROUTE ;  Start 6/15/20 

at 12:14;  Stop 6/15/20 at 12:14;  Status DC


Lidocaine HCl (Buffered Lidocaine 1%) 3 ml 1X  ONCE IJ  Last administered on 

6/15/20at 13:11;  Start 6/15/20 at 13:00;  Stop 6/15/20 at 13:01;  Status DC


Magnesium Sulfate 50 ml @ 25 mls/hr 1X  ONCE IV ;  Start 6/16/20 at 08:15;  Stop

6/16/20 at 10:14;  Status DC


Potassium Acetate 40 meq/Magnesium Sulfate 10 meq/ Multivitamins 10 ml/Chromium/

Copper/Manganese/ Seleni/Zn 1 ml/ Insulin Human Regular 20 unit/ Total 

Parenteral Nutrition/Amino Acids/Dextrose/ Fat Emulsion Intravenous 1,920 ml @  

80 mls/hr TPN  CONT IV  Last administered on 6/16/20at 21:32;  Start 6/16/20 at 

22:00;  Stop 6/17/20 at 21:59;  Status DC


Potassium Chloride/Water 100 ml @  100 mls/hr Q1H IV  Last administered on 

6/17/20at 09:12;  Start 6/17/20 at 08:00;  Stop 6/17/20 at 09:59;  Status DC


Alteplase, Recombinant (Cathflo For Central Catheter Clearance) 4 mg 1X  ONCE 

INT CAT ;  Start 6/17/20 at 09:15;  Stop 6/17/20 at 09:16;  Status UNV


Alteplase, Recombinant (Cathflo For Central Catheter Clearance) 4 mg 1X  ONCE 

INT CAT ;  Start 6/17/20 at 09:15;  Stop 6/17/20 at 09:16;  Status UNV


Alteplase, Recombinant (Cathflo For Central Catheter Clearance) 4 mg 1X  ONCE 

INT CAT ;  Start 6/17/20 at 09:15;  Stop 6/17/20 at 09:16;  Status UNV


Alteplase, Recombinant 4 mg/ Sodium Chloride 20 ml @ 20 mls/hr 1X  ONCE IV  Last

administered on 6/17/20at 10:10;  Start 6/17/20 at 10:00;  Stop 6/17/20 at 

10:59;  Status DC


Alteplase, Recombinant 4 mg/ Sodium Chloride 20 ml @ 20 mls/hr 1X  ONCE IV  Last

administered on 6/17/20at 10:09;  Start 6/17/20 at 10:00;  Stop 6/17/20 at 

10:59;  Status DC


Alteplase, Recombinant 4 mg/ Sodium Chloride 20 ml @ 20 mls/hr 1X  ONCE IV  Last

administered on 6/17/20at 10:09;  Start 6/17/20 at 10:00;  Stop 6/17/20 at 

10:59;  Status DC


Potassium Acetate 60 meq/Magnesium Sulfate 10 meq/ Multivitamins 10 ml/Chromium/

Copper/Manganese/ Seleni/Zn 1 ml/ Insulin Human Regular 20 unit/ Total 

Parenteral Nutrition/Amino Acids/Dextrose/ Fat Emulsion Intravenous 1,920 ml @  

80 mls/hr TPN  CONT IV  Last administered on 6/17/20at 21:55;  Start 6/17/20 at 

22:00;  Stop 6/18/20 at 21:59;  Status DC


Albumin Human 500 ml @  125 mls/hr 1X  ONCE IV  Last administered on 6/18/20at 

12:01;  Start 6/18/20 at 11:15;  Stop 6/18/20 at 15:14;  Status DC


Sodium Chloride 500 ml @  500 mls/hr 1X  ONCE IV  Last administered on 6/18/20at

13:50;  Start 6/18/20 at 11:15;  Stop 6/18/20 at 12:14;  Status DC


Potassium Acetate 60 meq/Magnesium Sulfate 14 meq/ Multivitamins 10 ml/Chromium/

Copper/Manganese/ Seleni/Zn 1 ml/ Insulin Human Regular 20 unit/ Total 

Parenteral Nutrition/Amino Acids/Dextrose/ Fat Emulsion Intravenous 1,920 ml @  

80 mls/hr TPN  CONT IV  Last administered on 6/18/20at 22:26;  Start 6/18/20 at 

22:00;  Stop 6/19/20 at 21:59;  Status DC


Ciprofloxacin/ Dextrose 200 ml @  200 mls/hr Q12HR IV  Last administered on 

6/25/20at 08:27;  Start 6/18/20 at 21:00;  Stop 6/25/20 at 08:56;  Status DC


Albumin Human 250 ml @  62.5 mls/hr 1X  ONCE IV  Last administered on 6/19/20at 

11:09;  Start 6/19/20 at 11:00;  Stop 6/19/20 at 14:59;  Status DC


Furosemide (Lasix) 20 mg 1X  ONCE IVP  Last administered on 6/19/20at 14:52;  

Start 6/19/20 at 10:45;  Stop 6/19/20 at 10:49;  Status DC


Potassium Acetate 60 meq/Magnesium Sulfate 14 meq/ Multivitamins 10 ml/Chromium/

Copper/Manganese/ Seleni/Zn 1 ml/ Insulin Human Regular 15 unit/ Total 

Parenteral Nutrition/Amino Acids/Dextrose/ Fat Emulsion Intravenous 1,920 ml @  

80 mls/hr TPN  CONT IV  Last administered on 6/19/20at 22:08;  Start 6/19/20 at 

22:00;  Stop 6/20/20 at 21:59;  Status DC


Potassium Acetate 60 meq/Magnesium Sulfate 14 meq/ Multivitamins 10 ml/Chromium/

Copper/Manganese/ Seleni/Zn 1 ml/ Insulin Human Regular 15 unit/ Total 

Parenteral Nutrition/Amino Acids/Dextrose/ Fat Emulsion Intravenous 1,920 ml @  

80 mls/hr TPN  CONT IV  Last administered on 6/20/20at 22:12;  Start 6/20/20 at 

22:00;  Stop 6/21/20 at 21:59;  Status DC


Potassium Acetate 60 meq/Magnesium Sulfate 14 meq/ Multivitamins 10 ml/Chromium/

Copper/Manganese/ Seleni/Zn 1 ml/ Insulin Human Regular 15 unit/ Total 

Parenteral Nutrition/Amino Acids/Dextrose/ Fat Emulsion Intravenous 1,920 ml @  

80 mls/hr TPN  CONT IV  Last administered on 6/21/20at 22:22;  Start 6/21/20 at 

22:00;  Stop 6/22/20 at 21:59;  Status DC


Furosemide (Lasix) 20 mg 1X  ONCE IVP  Last administered on 6/22/20at 11:07;  

Start 6/22/20 at 10:30;  Stop 6/22/20 at 10:34;  Status DC


Potassium Acetate 60 meq/Magnesium Sulfate 14 meq/ Multivitamins 10 ml/Chromium/

Copper/Manganese/ Seleni/Zn 1 ml/ Insulin Human Regular 15 unit/ Sodium Chloride

20 meq/Total Parenteral Nutrition/Amino Acids/Dextrose/ Fat Emulsion Intravenous

1,920 ml @  80 mls/hr TPN  CONT IV  Last administered on 6/22/20at 21:54;  Start

6/22/20 at 22:00;  Stop 6/23/20 at 21:59;  Status DC


Potassium Acetate 30 meq/Magnesium Sulfate 14 meq/ Multivitamins 10 ml/Chromium/

Copper/Manganese/ Seleni/Zn 1 ml/ Insulin Human Regular 15 unit/ Sodium Chloride

20 meq/Potassium Chloride 30 meq/ Total Parenteral Nutrition/Amino 

Acids/Dextrose/ Fat Emulsion Intravenous 1,920 ml @  80 mls/hr TPN  CONT IV  

Last administered on 6/23/20at 21:46;  Start 6/23/20 at 22:00;  Stop 6/24/20 at 

21:59;  Status DC


Sodium Chloride 80 meq/Potassium Chloride 30 meq/ Potassium Acetate 30 

meq/Magnesium Sulfate 14 meq/ Multivitamins 10 ml/Chromium/ Copper/Manganese/ 

Seleni/Zn 1 ml/ Insulin Human Regular 15 unit/ Total Parenteral Nutrition/Amino 

Acids/Dextrose/ Fat Emulsion Intravenous 1,920 ml @  80 mls/hr TPN  CONT IV  

Last administered on 6/24/20at 22:33;  Start 6/24/20 at 22:00;  Stop 6/25/20 at 

21:59;  Status DC


Furosemide (Lasix) 40 mg 1X  ONCE IVP  Last administered on 6/24/20at 16:27;  

Start 6/24/20 at 15:30;  Stop 6/24/20 at 15:33;  Status DC


Albumin Human 250 ml @  62.5 mls/hr 1X  ONCE IV  Last administered on 6/24/20at 

16:27;  Start 6/24/20 at 15:30;  Stop 6/24/20 at 19:29;  Status DC


Sodium Chloride 80 meq/Potassium Chloride 30 meq/ Potassium Acetate 30 

meq/Magnesium Sulfate 14 meq/ Multivitamins 10 ml/Chromium/ Copper/Manganese/ 

Seleni/Zn 1 ml/ Insulin Human Regular 15 unit/ Total Parenteral Nutrition/Amino 

Acids/Dextrose/ Fat Emulsion Intravenous 1,920 ml @  80 mls/hr TPN  CONT IV  

Last administered on 6/25/20at 22:25;  Start 6/25/20 at 22:00;  Stop 6/26/20 at 

21:59;  Status DC


Sodium Chloride 80 meq/Potassium Chloride 30 meq/ Potassium Acetate 30 

meq/Magnesium Sulfate 14 meq/ Multivitamins 10 ml/Chromium/ Copper/Manganese/ 

Seleni/Zn 1 ml/ Insulin Human Regular 15 unit/ Total Parenteral Nutrition/Amino 

Acids/Dextrose/ Fat Emulsion Intravenous 1,920 ml @  80 mls/hr TPN  CONT IV  

Last administered on 6/26/20at 21:32;  Start 6/26/20 at 22:00;  Stop 6/27/20 at 

21:59;  Status DC


Sodium Chloride 80 meq/Potassium Chloride 30 meq/ Potassium Acetate 30 

meq/Magnesium Sulfate 14 meq/ Multivitamins 10 ml/Chromium/ Copper/Manganese/ 

Seleni/Zn 1 ml/ Insulin Human Regular 15 unit/ Total Parenteral Nutrition/Amino 

Acids/Dextrose/ Fat Emulsion Intravenous 1,920 ml @  80 mls/hr TPN  CONT IV  

Last administered on 6/27/20at 21:53;  Start 6/27/20 at 22:00;  Stop 6/28/20 at 

21:59;  Status DC


Acetylcysteine (Mucomyst 20% Resp Treatment) 600 mg RTBID NEB  Last administered

on 8/10/20at 08:42;  Start 6/27/20 at 12:00


Sodium Chloride 80 meq/Potassium Chloride 30 meq/ Potassium Acetate 30 

meq/Magnesium Sulfate 14 meq/ Multivitamins 10 ml/Chromium/ Copper/Manganese/ 

Seleni/Zn 1 ml/ Insulin Human Regular 15 unit/ Total Parenteral Nutrition/Amino 

Acids/Dextrose/ Fat Emulsion Intravenous 1,920 ml @  80 mls/hr TPN  CONT IV  

Last administered on 6/28/20at 22:06;  Start 6/28/20 at 22:00;  Stop 6/29/20 at 

21:59;  Status DC


Meropenem 500 mg/ Sodium Chloride 50 ml @  100 mls/hr Q6HRS IV  Last 

administered on 7/21/20at 06:15;  Start 6/28/20 at 18:00;  Stop 7/21/20 at 

08:23;  Status DC


Daptomycin 500 mg/ Sodium Chloride 50 ml @  100 mls/hr Q24H IV  Last administe

red on 7/6/20at 21:47;  Start 6/28/20 at 19:00;  Stop 7/7/20 at 08:13;  Status 

DC


Sodium Chloride 80 meq/Potassium Chloride 30 meq/ Potassium Acetate 30 

meq/Magnesium Sulfate 14 meq/ Multivitamins 10 ml/Chromium/ Copper/Manganese/ 

Seleni/Zn 1 ml/ Insulin Human Regular 15 unit/ Total Parenteral Nutrition/Amino 

Acids/Dextrose/ Fat Emulsion Intravenous 1,920 ml @  80 mls/hr TPN  CONT IV  

Last administered on 6/29/20at 22:09;  Start 6/29/20 at 22:00;  Stop 6/30/20 at 

21:59;  Status DC


Heparin Sodium (Porcine) 1000 unit/Sodium Chloride 1,001 ml @  1,001 mls/hr 1X  

ONCE IRR ;  Start 6/30/20 at 06:00;  Stop 6/30/20 at 06:59;  Status DC


Propofol (Diprivan) 200 mg STK-MED ONCE IV ;  Start 6/30/20 at 07:44;  Stop 

6/30/20 at 07:44;  Status DC


Lidocaine HCl (Lidocaine Pf 2% Vial) 5 ml STK-MED ONCE .ROUTE ;  Start 6/30/20 

at 07:44;  Stop 6/30/20 at 07:44;  Status DC


Fentanyl Citrate (Fentanyl 2ml Vial) 100 mcg STK-MED ONCE .ROUTE ;  Start 

6/30/20 at 07:44;  Stop 6/30/20 at 07:44;  Status DC


Rocuronium Bromide (Zemuron) 100 mg STK-MED ONCE .ROUTE ;  Start 6/30/20 at 

07:44;  Stop 6/30/20 at 07:44;  Status DC


Micafungin Sodium 100 mg/Dextrose 100 ml @  100 mls/hr Q24H IV  Last 

administered on 8/4/20at 09:30;  Start 6/30/20 at 08:30;  Stop 8/5/20 at 08:20; 

Status DC


Bupivacaine HCl/ Epinephrine Bitart (Sensorcain-Epi 0.5%-1:[id number] Mpf) 30 ml 

STK-MED ONCE .ROUTE ;  Start 6/30/20 at 08:34;  Stop 6/30/20 at 08:35;  Status 

DC


Iohexol (Omnipaque 300 Mg/ml) 50 ml STK-MED ONCE .ROUTE  Last administered on 

6/30/20at 13:30;  Start 6/30/20 at 08:35;  Stop 6/30/20 at 08:35;  Status DC


Sodium Chloride 80 meq/Potassium Chloride 30 meq/ Potassium Acetate 30 meq/Magn

esium Sulfate 14 meq/ Multivitamins 10 ml/Chromium/ Copper/Manganese/ Seleni/Zn 

1 ml/ Insulin Human Regular 15 unit/ Total Parenteral Nutrition/Amino 

Acids/Dextrose/ Fat Emulsion Intravenous 1,920 ml @  80 mls/hr TPN  CONT IV  

Last administered on 7/1/20at 01:22;  Start 6/30/20 at 22:00;  Stop 7/1/20 at 

21:59;  Status DC


Phenylephrine HCl (Ken-Synephrine Inj) 10 mg STK-MED ONCE .ROUTE ;  Start 

6/30/20 at 10:15;  Stop 6/30/20 at 10:15;  Status DC


Desflurane (Suprane) 90 ml STK-MED ONCE IH ;  Start 6/30/20 at 10:18;  Stop 

6/30/20 at 10:19;  Status DC


Albumin Human 500 ml @  As Directed STK-MED ONCE IV ;  Start 6/30/20 at 11:06;  

Stop 6/30/20 at 11:06;  Status DC


Vasopressin (Vasostrict) 20 unit STK-MED ONCE .ROUTE ;  Start 6/30/20 at 12:23; 

Stop 6/30/20 at 12:23;  Status DC


Phenylephrine HCl (Ken-Synephrine Inj) 10 mg STK-MED ONCE .ROUTE ;  Start 

6/30/20 at 13:33;  Stop 6/30/20 at 13:33;  Status DC


Phenylephrine HCl (Ken-Synephrine Inj) 10 mg STK-MED ONCE .ROUTE ;  Start 

6/30/20 at 13:33;  Stop 6/30/20 at 13:33;  Status DC


Ondansetron HCl (Zofran) 4 mg STK-MED ONCE .ROUTE ;  Start 6/30/20 at 13:33;  

Stop 6/30/20 at 13:33;  Status DC


Enoxaparin Sodium (Lovenox 40mg Syringe) 40 mg Q24H SQ  Last administered on 

8/10/20at 08:23;  Start 7/1/20 at 08:00


Sodium Chloride (Normal Saline Flush) 3 ml QSHIFT  PRN IV AFTER MEDS AND BLOOD 

DRAWS;  Start 6/30/20 at 14:45;  Stop 8/3/20 at 09:45;  Status DC


Naloxone HCl (Narcan) 0.4 mg PRN Q2MIN  PRN IV SEE INSTRUCTIONS;  Start 6/30/20 

at 14:45;  Stop 8/3/20 at 09:45;  Status DC


Sodium Chloride 1,000 ml @  25 mls/hr Q24H IV  Last administered on 8/2/20at 

20:55;  Start 6/30/20 at 14:33;  Stop 8/3/20 at 09:45;  Status DC


Morphine Sulfate (Morphine Sulfate) 1 mg PRN Q1HR  PRN IV PAIN Last administered

on 7/25/20at 18:28;  Start 6/30/20 at 14:45;  Stop 8/3/20 at 09:45;  Status DC


Midazolam HCl 100 mg/Sodium Chloride 100 ml @ 1 mls/hr CONT  PRN IV SEE I/O 

RECORD Last administered on 7/3/20at 18:48;  Start 6/30/20 at 14:45;  Stop 

8/3/20 at 09:45;  Status DC


Phenylephrine HCl (PHENYLEPHRINE in 0.9% NACL PF) 1 mg STK-MED ONCE IV ;  Start 

6/30/20 at 14:44;  Stop 6/30/20 at 14:45;  Status DC


Ephedrine Sulfate (ePHEDrine PF IN SALINE SYRINGE) 50 mg STK-MED ONCE IV ;  

Start 6/30/20 at 14:45;  Stop 6/30/20 at 14:45;  Status DC


Vasopressin 20 unit/Dextrose 101 ml @  12 mls/hr CONT  PRN IV SEE I/O RECORD 

Last administered on 7/7/20at 04:17;  Start 6/30/20 at 15:30;  Stop 8/3/20 at 

09:45;  Status DC


Sodium Chloride 1,000 ml @  1,000 mls/hr 1X  ONCE IV  Last administered on 

6/30/20at 15:42;  Start 6/30/20 at 15:45;  Stop 6/30/20 at 16:44;  Status DC


Albumin Human 500 ml @  125 mls/hr 1X  ONCE IV ;  Start 6/30/20 at 16:00;  Stop 

6/30/20 at 19:59;  Status DC


Albumin Human 500 ml @  125 mls/hr PRN Q1HR  PRN IV PER PROTOCOL;  Start 6/30/20

at 15:45;  Stop 8/3/20 at 09:52;  Status DC


Magnesium Sulfate 50 ml @ 25 mls/hr 1X  ONCE IV  Last administered on 6/30/20at 

17:02;  Start 6/30/20 at 16:30;  Stop 6/30/20 at 18:29;  Status DC


Sodium Bicarbonate (Sodium Bicarb Adult 8.4% Syr) 50 meq STK-MED ONCE .ROUTE ;  

Start 6/30/20 at 16:20;  Stop 6/30/20 at 16:20;  Status DC


Sodium Bicarbonate (Sodium Bicarb Adult 8.4% Syr) 100 meq 1X  ONCE IV  Last 

administered on 6/30/20at 17:07;  Start 6/30/20 at 16:30;  Stop 6/30/20 at 

16:31;  Status DC


Sodium Bicarbonate 150 meq/Dextrose 1,150 ml @  75 mls/hr 1X  ONCE IV  Last 

administered on 6/30/20at 20:02;  Start 6/30/20 at 16:30;  Stop 7/1/20 at 07:49;

 Status DC


Sodium Chloride 80 meq/Potassium Chloride 30 meq/ Potassium Acetate 30 

meq/Magnesium Sulfate 14 meq/ Multivitamins 10 ml/Chromium/ Copper/Manganese/ 

Seleni/Zn 1 ml/ Insulin Human Regular 15 unit/ Total Parenteral Nutrition/Amino 

Acids/Dextrose/ Fat Emulsion Intravenous 1,920 ml @  80 mls/hr TPN  CONT IV  

Last administered on 7/1/20at 23:05;  Start 7/1/20 at 22:00;  Stop 7/2/20 at 

21:59;  Status DC


Sodium Chloride 100 meq/Potassium Chloride 30 meq/ Potassium Acetate 30 

meq/Magnesium Sulfate 12 meq/ Multivitamins 10 ml/Chromium/ Copper/Manganese/ 

Seleni/Zn 1 ml/ Insulin Human Regular 15 unit/ Total Parenteral Nutrition/Amino 

Acids/Dextrose/ Fat Emulsion Intravenous 1,920 ml @  80 mls/hr TPN  CONT IV  

Last administered on 7/2/20at 21:52;  Start 7/2/20 at 22:00;  Stop 7/3/20 at 

21:59;  Status DC


Sodium Chloride 100 meq/Potassium Chloride 30 meq/ Potassium Acetate 30 

meq/Magnesium Sulfate 12 meq/ Multivitamins 10 ml/Chromium/ Copper/Manganese/ 

Seleni/Zn 1 ml/ Insulin Human Regular 15 unit/ Total Parenteral Nutrition/Amino 

Acids/Dextrose/ Fat Emulsion Intravenous 1,920 ml @  80 mls/hr TPN  CONT IV  

Last administered on 7/3/20at 21:46;  Start 7/3/20 at 22:00;  Stop 7/4/20 at 

21:59;  Status DC


Sodium Chloride 100 meq/Potassium Chloride 30 meq/ Potassium Acetate 30 

meq/Magnesium Sulfate 12 meq/ Multivitamins 10 ml/Chromium/ Copper/Manganese/ 

Seleni/Zn 1 ml/ Insulin Human Regular 15 unit/ Total Parenteral Nutrition/Amino 

Acids/Dextrose/ Fat Emulsion Intravenous 1,800 ml @  75 mls/hr TPN  CONT IV  

Last administered on 7/4/20at 22:04;  Start 7/4/20 at 22:00;  Stop 7/5/20 at 

21:59;  Status DC


Fentanyl Citrate 55 ml @ 0 mls/hr CONT  PRN IV SEE COMMENTS Last administered on

7/6/20at 23:55;  Start 7/4/20 at 13:00;  Stop 7/9/20 at 17:28;  Status DC


Sodium Chloride 100 meq/Potassium Chloride 30 meq/ Potassium Acetate 30 

meq/Magnesium Sulfate 12 meq/ Multivitamins 10 ml/Chromium/ Copper/Manganese/ 

Seleni/Zn 1 ml/ Insulin Human Regular 15 unit/ Total Parenteral Nutrition/Amino 

Acids/Dextrose/ Fat Emulsion Intravenous 1,680 ml @  70 mls/hr TPN  CONT IV  

Last administered on 7/5/20at 21:23;  Start 7/5/20 at 22:00;  Stop 7/6/20 at 

21:59;  Status DC


Sodium Chloride 110 meq/Potassium Chloride 30 meq/ Potassium Acetate 30 

meq/Magnesium Sulfate 15 meq/ Multivitamins 10 ml/Chromium/ Copper/Manganese/ 

Seleni/Zn 1 ml/ Insulin Human Regular 15 unit/ Total Parenteral Nutrition/Amino 

Acids/Dextrose/ Fat Emulsion Intravenous 1,680 ml @  70 mls/hr TPN  CONT IV  

Last administered on 7/6/20at 21:48;  Start 7/6/20 at 22:00;  Stop 7/7/20 at 

21:59;  Status DC


Sodium Chloride 110 meq/Potassium Chloride 30 meq/ Potassium Acetate 30 

meq/Magnesium Sulfate 15 meq/ Multivitamins 10 ml/Chromium/ Copper/Manganese/ 

Seleni/Zn 1 ml/ Insulin Human Regular 15 unit/ Total Parenteral Nutrition/Amino 

Acids/Dextrose/ Fat Emulsion Intravenous 1,680 ml @  70 mls/hr TPN  CONT IV  

Last administered on 7/7/20at 21:33;  Start 7/7/20 at 22:00;  Stop 7/8/20 at 

21:59;  Status DC


Sodium Chloride 110 meq/Potassium Chloride 30 meq/ Potassium Acetate 30 

meq/Magnesium Sulfate 15 meq/ Multivitamins 10 ml/Chromium/ Copper/Manganese/ 

Seleni/Zn 1 ml/ Insulin Human Regular 15 unit/ Total Parenteral Nutrition/Amino 

Acids/Dextrose/ Fat Emulsion Intravenous 1,680 ml @  70 mls/hr TPN  CONT IV  

Last administered on 7/8/20at 21:51;  Start 7/8/20 at 22:00;  Stop 7/9/20 at 

21:59;  Status DC


Sodium Chloride 90 meq/Potassium Chloride 30 meq/ Potassium Acetate 30 meq

/Magnesium Sulfate 15 meq/ Multivitamins 10 ml/Chromium/ Copper/Manganese/ 

Seleni/Zn 1 ml/ Insulin Human Regular 15 unit/ Total Parenteral Nutrition/Amino 

Acids/Dextrose/ Fat Emulsion Intravenous 1,680 ml @  70 mls/hr TPN  CONT IV  

Last administered on 7/9/20at 22:38;  Start 7/9/20 at 22:00;  Stop 7/10/20 at 

21:59;  Status DC


Fentanyl Citrate 30 ml @ 0 mls/hr CONT  PRN IV SEE I/O RECORD;  Start 7/9/20 at 

17:30;  Stop 8/3/20 at 09:45;  Status DC


Fentanyl (Duragesic 12mcg/ Hr Patch) 1 patch Q3DAYS TD  Last administered on 

8/9/20at 09:09;  Start 7/10/20 at 09:00


Sodium Chloride 90 meq/Potassium Chloride 30 meq/ Potassium Acetate 30 meq

/Magnesium Sulfate 15 meq/ Multivitamins 10 ml/Chromium/ Copper/Manganese/ 

Seleni/Zn 1 ml/ Insulin Human Regular 15 unit/ Total Parenteral Nutrition/Amino 

Acids/Dextrose/ Fat Emulsion Intravenous 1,680 ml @  70 mls/hr TPN  CONT IV  

Last administered on 7/10/20at 21:59;  Start 7/10/20 at 22:00;  Stop 7/11/20 at 

21:59;  Status DC


Sodium Chloride 90 meq/Potassium Chloride 30 meq/ Potassium Acetate 30 

meq/Magnesium Sulfate 15 meq/ Multivitamins 10 ml/Chromium/ Copper/Manganese/ 

Seleni/Zn 1 ml/ Insulin Human Regular 15 unit/ Total Parenteral Nutrition/Amino 

Acids/Dextrose/ Fat Emulsion Intravenous 1,680 ml @  70 mls/hr TPN  CONT IV  

Last administered on 7/11/20at 21:35;  Start 7/11/20 at 22:00;  Stop 7/12/20 at 

21:59;  Status DC


Vancomycin HCl (Vanco Per Pharmacy) 1 each PRN DAILY  PRN MC SEE COMMENTS Last 

administered on 7/14/20at 02:46;  Start 7/12/20 at 09:15;  Stop 7/15/20 at 

07:41;  Status DC


Ciprofloxacin/ Dextrose 200 ml @  200 mls/hr Q12HR IV  Last administered on 

7/20/20at 21:02;  Start 7/12/20 at 10:00;  Stop 7/21/20 at 08:20;  Status DC


Vancomycin HCl 2 gm/Sodium Chloride 500 ml @  250 mls/hr 1X  ONCE IV  Last 

administered on 7/12/20at 10:34;  Start 7/12/20 at 10:00;  Stop 7/12/20 at 

11:59;  Status DC


Sodium Chloride 90 meq/Potassium Chloride 30 meq/ Potassium Acetate 30 

meq/Magnesium Sulfate 15 meq/ Multivitamins 10 ml/Chromium/ Copper/Manganese/ 

Seleni/Zn 1 ml/ Insulin Human Regular 15 unit/ Total Parenteral Nutrition/Amino 

Acids/Dextrose/ Fat Emulsion Intravenous 1,680 ml @  70 mls/hr TPN  CONT IV  Las

t administered on 7/12/20at 22:02;  Start 7/12/20 at 22:00;  Stop 7/13/20 at 

21:59;  Status DC


Diphenhydramine HCl (Benadryl) 25 mg 1X  ONCE IVP  Last administered on 

7/12/20at 14:26;  Start 7/12/20 at 14:30;  Stop 7/12/20 at 14:31;  Status DC


Vancomycin HCl 1.5 gm/Sodium Chloride 500 ml @  250 mls/hr Q8H IV  Last 

administered on 7/13/20at 03:08;  Start 7/12/20 at 18:30;  Stop 7/13/20 at 

12:24;  Status DC


Vancomycin HCl (Vancomycin Trough Level) 1 each 1X  ONCE MC  Last administered 

on 7/13/20at 10:00;  Start 7/13/20 at 10:00;  Stop 7/13/20 at 10:01;  Status DC


Sodium Chloride 90 meq/Potassium Chloride 30 meq/ Potassium Acetate 30 m

eq/Magnesium Sulfate 15 meq/ Multivitamins 10 ml/Chromium/ Copper/Manganese/ 

Seleni/Zn 1 ml/ Insulin Human Regular 15 unit/ Total Parenteral Nutrition/Amino 

Acids/Dextrose/ Fat Emulsion Intravenous 1,680 ml @  70 mls/hr TPN  CONT IV  

Last administered on 7/13/20at 22:13;  Start 7/13/20 at 22:00;  Stop 7/14/20 at 

21:59;  Status DC


Vancomycin HCl (Vancomycin Random Level) 1 each 1X  ONCE MC  Last administered 

on 7/14/20at 01:00;  Start 7/14/20 at 01:00;  Stop 7/14/20 at 01:01;  Status DC


Vancomycin HCl 1.5 gm/Sodium Chloride 500 ml @  250 mls/hr Q12H IV  Last 

administered on 7/14/20at 22:07;  Start 7/14/20 at 10:00;  Stop 7/15/20 at 0

7:41;  Status DC


Vancomycin HCl (Vancomycin Trough Level) 1 each 1X  ONCE MC ;  Start 7/15/20 at 

09:30;  Stop 7/15/20 at 09:31;  Status Cancel


Sodium Chloride 90 meq/Potassium Chloride 30 meq/ Potassium Acetate 30 

meq/Magnesium Sulfate 15 meq/ Multivitamins 10 ml/Chromium/ Copper/Manganese/ 

Seleni/Zn 1 ml/ Insulin Human Regular 15 unit/ Total Parenteral Nutrition/Amino 

Acids/Dextrose/ Fat Emulsion Intravenous 1,680 ml @  70 mls/hr TPN  CONT IV  

Last administered on 7/14/20at 22:08;  Start 7/14/20 at 22:00;  Stop 7/15/20 at 

21:59;  Status DC


Alteplase, Recombinant (Cathflo For Central Catheter Clearance) 1 mg 1X  ONCE 

INT CAT  Last administered on 7/14/20at 11:49;  Start 7/14/20 at 11:00;  Stop 

7/14/20 at 11:01;  Status DC


Daptomycin 500 mg/ Sodium Chloride 50 ml @  100 mls/hr Q24H IV  Last 

administered on 7/24/20at 08:25;  Start 7/15/20 at 09:00;  Stop 7/24/20 at 

08:38;  Status DC


Sodium Chloride 90 meq/Potassium Chloride 30 meq/ Potassium Acetate 30 

meq/Magnesium Sulfate 15 meq/ Multivitamins 10 ml/Chromium/ Copper/Manganese/ 

Seleni/Zn 1 ml/ Insulin Human Regular 15 unit/ Total Parenteral Nutrition/Amino 

Acids/Dextrose/ Fat Emulsion Intravenous 1,680 ml @  70 mls/hr TPN  CONT IV  

Last administered on 7/15/20at 22:55;  Start 7/15/20 at 22:00;  Stop 7/16/20 at 

21:59;  Status DC


Sodium Chloride 90 meq/Potassium Chloride 30 meq/ Potassium Acetate 30 

meq/Magnesium Sulfate 15 meq/ Multivitamins 10 ml/Chromium/ Copper/Manganese/ 

Seleni/Zn 1 ml/ Insulin Human Regular 15 unit/ Total Parenteral Nutrition/Amino 

Acids/Dextrose/ Fat Emulsion Intravenous 1,680 ml @  70 mls/hr TPN  CONT IV  

Last administered on 7/16/20at 22:06;  Start 7/16/20 at 22:00;  Stop 7/17/20 at 

21:59;  Status DC


Diphenhydramine HCl (Benadryl) 50 mg STK-MED ONCE .ROUTE ;  Start 7/16/20 at 

18:34;  Stop 7/16/20 at 18:35;  Status DC


Diphenhydramine HCl (Benadryl) 25 mg 1X  ONCE IM ;  Start 7/16/20 at 18:45;  

Stop 7/16/20 at 18:46;  Status DC


Diphenhydramine HCl (Benadryl) 25 mg 1X  ONCE IVP  Last administered on 

7/16/20at 18:56;  Start 7/16/20 at 19:00;  Stop 7/16/20 at 19:01;  Status DC


Alprazolam (Xanax) 0.5 mg PRN TID  PRN PO ANXIETY / AGITATION Last administered 

on 7/23/20at 11:49;  Start 7/17/20 at 08:00;  Stop 7/23/20 at 15:54;  Status DC


Sodium Chloride 110 meq/Potassium Chloride 30 meq/ Potassium Acetate 30 

meq/Magnesium Sulfate 15 meq/ Multivitamins 10 ml/Chromium/ Copper/Manganese/ 

Seleni/Zn 1 ml/ Insulin Human Regular 15 unit/ Total Parenteral Nutrition/Amino 

Acids/Dextrose/ Fat Emulsion Intravenous 1,680 ml @  70 mls/hr TPN  CONT IV  

Last administered on 7/17/20at 21:21;  Start 7/17/20 at 22:00;  Stop 7/18/20 at 

21:59;  Status DC


Sodium Chloride 110 meq/Potassium Chloride 30 meq/ Potassium Acetate 30 

meq/Magnesium Sulfate 15 meq/ Multivitamins 10 ml/Chromium/ Copper/Manganese/ 

Seleni/Zn 1 ml/ Insulin Human Regular 15 unit/ Total Parenteral Nutrition/Amino 

Acids/Dextrose/ Fat Emulsion Intravenous 1,680 ml @  70 mls/hr TPN  CONT IV  

Last administered on 7/18/20at 22:01;  Start 7/18/20 at 22:00;  Stop 7/19/20 at 

21:59;  Status DC


Alteplase, Recombinant (Cathflo For Central Catheter Clearance) 1 mg 1X  ONCE 

INT CAT  Last administered on 7/19/20at 08:23;  Start 7/19/20 at 08:15;  Stop 

7/19/20 at 08:16;  Status DC


Sodium Chloride 110 meq/Sodium Phosphate 10 mmol/ Potassium Chloride 30 meq/ 

Potassium Acetate 30 meq/Magnesium Sulfate 15 meq/ Multivitamins 10 ml/Chromium/

Copper/Manganese/ Seleni/Zn 1 ml/ Insulin Human Regular 15 unit/ Total 

Parenteral Nutrition/Amino Acids/Dextrose/ Fat Emulsion Intravenous 1,680 ml @  

70 mls/hr TPN  CONT IV  Last administered on 7/19/20at 22:03;  Start 7/19/20 at 

22:00;  Stop 7/20/20 at 21:59;  Status DC


Sodium Chloride 120 meq/Sodium Phosphate 10 mmol/ Potassium Chloride 30 meq/ 

Potassium Acetate 30 meq/Magnesium Sulfate 15 meq/ Multivitamins 10 ml/Chromium/

Copper/Manganese/ Seleni/Zn 1 ml/ Insulin Human Regular 15 unit/ Total 

Parenteral Nutrition/Amino Acids/Dextrose/ Fat Emulsion Intravenous 1,680 ml @  

70 mls/hr TPN  CONT IV  Last administered on 7/20/20at 22:08;  Start 7/20/20 at 

22:00;  Stop 7/21/20 at 21:59;  Status DC


Ceftazidime/ Avibactam 2.5 gm/ Sodium Chloride 100 ml @  50 mls/hr Q8HRS IV  

Last administered on 7/22/20at 05:32;  Start 7/21/20 at 14:00;  Stop 7/22/20 at 

07:48;  Status DC


Alteplase, Recombinant (Cathflo For Central Catheter Clearance) 1 mg 1X  ONCE I

NT CAT  Last administered on 7/21/20at 09:30;  Start 7/21/20 at 09:30;  Stop 

7/21/20 at 09:31;  Status DC


Sodium Chloride 120 meq/Sodium Phosphate 10 mmol/ Potassium Chloride 30 meq/ 

Potassium Acetate 30 meq/Magnesium Sulfate 15 meq/ Multivitamins 10 ml/Chromium/

Copper/Manganese/ Seleni/Zn 1 ml/ Insulin Human Regular 15 unit/ Total 

Parenteral Nutrition/Amino Acids/Dextrose/ Fat Emulsion Intravenous 1,680 ml @  

70 mls/hr TPN  CONT IV  Last administered on 7/21/20at 22:11;  Start 7/21/20 at 

22:00;  Stop 7/22/20 at 21:59;  Status DC


Iohexol (Omnipaque 300 Mg/ml) 75 ml 1X  ONCE IV  Last administered on 7/21/20at 

11:30;  Start 7/21/20 at 11:30;  Stop 7/21/20 at 11:31;  Status DC


Info (CONTRAST GIVEN -- Rx MONITORING) 1 each PRN DAILY  PRN MC SEE COMMENTS;  

Start 7/21/20 at 11:45;  Stop 7/23/20 at 11:44;  Status DC


Alteplase, Recombinant (Cathflo For Central Catheter Clearance) 1 mg 1X  ONCE 

INT CAT  Last administered on 7/21/20at 12:17;  Start 7/21/20 at 12:00;  Stop 

7/21/20 at 12:01;  Status DC


Cefepime HCl (Maxipime) 2 gm Q12HR IVP  Last administered on 7/22/20at 20:53;  

Start 7/22/20 at 09:00;  Stop 7/23/20 at 07:30;  Status DC


Sodium Chloride 120 meq/Sodium Phosphate 10 mmol/ Potassium Chloride 30 meq/ 

Potassium Acetate 30 meq/Magnesium Sulfate 15 meq/ Multivitamins 10 ml/Chromium/

Copper/Manganese/ Seleni/Zn 1 ml/ Insulin Human Regular 15 unit/ Total 

Parenteral Nutrition/Amino Acids/Dextrose/ Fat Emulsion Intravenous 1,680 ml @  

70 mls/hr TPN  CONT IV  Last administered on 7/22/20at 21:25;  Start 7/22/20 at 

22:00;  Stop 7/23/20 at 21:59;  Status DC


Ceftazidime/ Avibactam 2.5 gm/ Sodium Chloride 250 ml @  125 mls/hr Q8HRS IV  

Last administered on 8/5/20at 05:38;  Start 7/23/20 at 08:00;  Stop 8/5/20 at 

08:20;  Status DC


Sodium Chloride 120 meq/Sodium Phosphate 10 mmol/ Potassium Chloride 30 meq/ 

Potassium Acetate 30 meq/Magnesium Sulfate 15 meq/ Multivitamins 10 ml/Chromium/

Copper/Manganese/ Seleni/Zn 1 ml/ Insulin Human Regular 15 unit/ Total 

Parenteral Nutrition/Amino Acids/Dextrose/ Fat Emulsion Intravenous 1,680 ml @  

70 mls/hr TPN  CONT IV  Last administered on 7/23/20at 22:35;  Start 7/23/20 at 

22:00;  Stop 7/24/20 at 21:59;  Status DC


Alprazolam (Xanax) 0.5 mg PRN QID  PRN PO ANXIETY / AGITATION Last administered 

on 8/10/20at 03:46;  Start 7/23/20 at 16:00


Acetaminophen/ Hydrocodone Bitart (Lortab 5/325) 1 tab PRN Q4HRS  PRN PO PAIN 

Last administered on 8/10/20at 03:46;  Start 7/23/20 at 16:00


Sodium Chloride 120 meq/Sodium Phosphate 10 mmol/ Potassium Chloride 30 meq/ 

Potassium Acetate 30 meq/Magnesium Sulfate 15 meq/ Multivitamins 10 ml/Chromium/

Copper/Manganese/ Seleni/Zn 1 ml/ Insulin Human Regular 15 unit/ Total 

Parenteral Nutrition/Amino Acids/Dextrose/ Fat Emulsion Intravenous 1,680 ml @  

70 mls/hr TPN  CONT IV  Last administered on 7/24/20at 21:50;  Start 7/24/20 at 

22:00;  Stop 7/25/20 at 21:59;  Status DC


Sodium Chloride 120 meq/Sodium Phosphate 10 mmol/ Potassium Chloride 30 meq/ 

Potassium Acetate 30 meq/Magnesium Sulfate 15 meq/ Multivitamins 10 ml/Chromium/

Copper/Manganese/ Seleni/Zn 1 ml/ Insulin Human Regular 15 unit/ Total 

Parenteral Nutrition/Amino Acids/Dextrose/ Fat Emulsion Intravenous 1,680 ml @  

70 mls/hr TPN  CONT IV  Last administered on 7/25/20at 22:14;  Start 7/25/20 at 

22:00;  Stop 7/26/20 at 21:59;  Status DC


Daptomycin 500 mg/ Sodium Chloride 50 ml @  100 mls/hr Q24H IV  Last 

administered on 8/4/20at 09:20;  Start 7/26/20 at 09:00;  Stop 8/5/20 at 08:20; 

Status DC


Sodium Chloride 120 meq/Sodium Phosphate 10 mmol/ Potassium Chloride 30 meq/ 

Potassium Acetate 30 meq/Magnesium Sulfate 15 meq/ Multivitamins 10 ml/Chromium/

Copper/Manganese/ Seleni/Zn 1 ml/ Insulin Human Regular 15 unit/ Total 

Parenteral Nutrition/Amino Acids/Dextrose/ Fat Emulsion Intravenous 1,680 ml @  

70 mls/hr TPN  CONT IV ;  Start 7/26/20 at 22:00;  Stop 7/27/20 at 21:59;  

Status Cancel


Amino Acids/ Glycerin/ Electrolytes 1,000 ml @  80 mls/hr [id number] IV  Last 

administered on 8/1/20at 18:10;  Start 7/26/20 at 10:15;  Stop 8/2/20 at 07:07; 

Status DC


Iohexol (Omnipaque 300 Mg/ml) 50 ml 1X  ONCE IJ ;  Start 7/28/20 at 11:00;  Stop

7/28/20 at 11:01;  Status DC


Sodium Chloride 500 ml @  500 mls/hr 1X  ONCE IV  Last administered on 7/28/20at

18:30;  Start 7/28/20 at 18:30;  Stop 7/28/20 at 19:29;  Status DC


Lidocaine HCl (Buffered Lidocaine 1%) 3 ml 1X  ONCE INJ ;  Start 7/30/20 at 

12:15;  Stop 7/30/20 at 12:17;  Status DC


Fentanyl Citrate (Fentanyl 2ml Vial) 25 mcg PRN Q6HRS  PRN IVP SEE INSTUCTIONS 

Last administered on 8/9/20at 06:00;  Start 8/3/20 at 10:00


Sodium Chloride 1,000 ml @  125 mls/hr Q8H IV  Last administered on 8/3/20at 

23:16;  Start 8/3/20 at 23:21;  Stop 8/4/20 at 09:23;  Status DC


Sodium Chloride 1,000 ml @  350 mls/hr 1X  ONCE IV  Last administered on 

8/3/20at 20:29;  Start 8/3/20 at 20:30;  Stop 8/3/20 at 23:21;  Status DC


Vitamin A/Vitamin D (Vitamin A & D Ointment) 1 thomas PRN Q1HR  PRN TP SKIN 

PROTECTION Last administered on 8/8/20at 05:26;  Start 8/4/20 at 09:15


Iohexol (Omnipaque 240 Mg/ml) 50 ml STK-MED ONCE .ROUTE ;  Start 8/4/20 at 

14:18;  Stop 8/4/20 at 14:19;  Status DC


Lidocaine HCl (Buffered Lidocaine 1%) 3 ml STK-MED ONCE .ROUTE ;  Start 8/4/20 

at 14:19;  Stop 8/4/20 at 14:19;  Status DC


Fentanyl Citrate (Fentanyl 2ml Vial) 100 mcg STK-MED ONCE .ROUTE ;  Start 8/4/20

at 15:03;  Stop 8/4/20 at 15:03;  Status DC


Lidocaine HCl (Buffered Lidocaine 1%) 5 ml 1X  ONCE INJ  Last administered on 

8/4/20at 15:00;  Start 8/4/20 at 15:45;  Stop 8/4/20 at 15:46;  Status DC


Iohexol (Omnipaque 240 Mg/ml) 10 ml 1X  ONCE IJ  Last administered on 8/4/20at 

15:00;  Start 8/4/20 at 15:45;  Stop 8/4/20 at 15:46;  Status DC


Multivitamins/ Minerals Therapeutic (Centrum Multivit-Mineral Liq) 5 ml DAILY 

PEG  Last administered on 8/10/20at 08:23;  Start 8/5/20 at 09:00


Alteplase, Recombinant 5 mg/ Sodium Chloride 30 ml @ 30 mls/hr 1X  PRN IV SEE 

COMMENTS;  Start 8/5/20 at 06:45;  Status UNV


Alteplase, Recombinant (Cathflo For Central Catheter Clearance) 1 mg 1X  ONCE 

INT CAT  Last administered on 8/5/20at 09:30;  Start 8/5/20 at 07:00;  Stop 

8/5/20 at 07:01;  Status DC


Sodium Chloride 1,000 ml @  125 mls/hr 1X  ONCE IV  Last administered on 

8/5/20at 16:12;  Start 8/5/20 at 14:30;  Stop 8/5/20 at 22:29;  Status DC


Furosemide (Lasix) 40 mg 1X  ONCE IVP  Last administered on 8/5/20at 16:17;  

Start 8/5/20 at 14:30;  Stop 8/5/20 at 14:33;  Status DC


Furosemide (Lasix) 40 mg BID92 IVP  Last administered on 8/6/20at 13:51;  Start 

8/6/20 at 09:00;  Stop 8/6/20 at 14:01;  Status DC


Sodium Chloride 1,000 ml @  125 mls/hr 1X  ONCE IV  Last administered on 

8/6/20at 08:20;  Start 8/6/20 at 07:45;  Stop 8/6/20 at 15:44;  Status DC


Calcitonin [place] (Miacalcin) 400 unit BID SQ  Last administered on 8/6/20at 

18:18;  Start 8/6/20 at 18:00;  Stop 8/7/20 at 15:56;  Status DC


Zoledronic Acid 100 ml @  400 mls/hr 1X  ONCE IV  Last administered on 8/7/20at 

13:04;  Start 8/7/20 at 12:00;  Stop 8/7/20 at 12:14;  Status DC


Metoprolol Tartrate (Lopressor Vial) 5 mg 1X  ONCE IVP  Last administered on 

8/9/20at 10:54;  Start 8/9/20 at 10:45;  Stop 8/9/20 at 10:46;  Status DC


Cefepime HCl (Maxipime) 2 gm Q12HR IVP ;  Start 8/10/20 at 10:00


Metronidazole 100 ml @  100 mls/hr Q12HR IV  Last administered on 8/10/20at 

10:04;  Start 8/10/20 at 10:00





Active Scripts


Active


Reported


Bisoprolol Fumarate 5 Mg Tablet 10 Mg PO DAILY


Vitals/I & O





Vital Sign - Last 24 Hours








 8/9/20 8/9/20 8/9/20 8/9/20





 10:54 10:55 11:57 13:09


 


Temp  99.7  





  99.7  


 


Pulse 155 151  


 


Resp  20  


 


B/P (MAP) 139/91 139/91 (107)  


 


Pulse Ox  95 94 94


 


O2 Delivery  Room Air Room Air Room Air


 


O2 Flow Rate    2.0


 


    





    





 8/9/20 8/9/20 8/9/20 8/9/20





 14:48 15:00 15:36 19:15


 


Temp  99.4  99.7





  99.4  99.7


 


Pulse 158 155  157


 


Resp  18  40


 


B/P (MAP) 139/91 142/91 (108)  129/84 (99)


 


Pulse Ox  91 96 92


 


O2 Delivery  Room Air Room Air Room Air


 


    





    





 8/9/20 8/9/20 8/9/20 8/9/20





 19:15 19:46 19:48 22:28


 


Temp    98.6





    98.6


 


Pulse    148


 


Resp    34


 


B/P (MAP)    109/67 (81)


 


Pulse Ox  95 95 95


 


O2 Delivery Room Air Room Air Room Air Room Air


 


    





    





 8/10/20 8/10/20 8/10/20 8/10/20





 01:47 02:32 03:46 05:00


 


Temp  99.1  





  99.1  


 


Pulse 160 141  


 


Resp  44 20 20


 


B/P (MAP) 109/67 115/63 (80)  


 


Pulse Ox  94  94


 


O2 Delivery  Room Air  Room Air


 


O2 Flow Rate    2.0


 


    





    





 8/10/20 8/10/20  





 07:00 08:42  


 


Temp 99.2   





 99.2   


 


Pulse 136   


 


Resp 20   


 


B/P (MAP) 107/64 (78)   


 


Pulse Ox 95 95  


 


O2 Delivery Room Air Room Air  














Intake and Output   


 


 8/9/20 8/9/20 8/10/20





 15:00 23:00 07:00


 


Intake Total 45 ml 306 ml 691 ml


 


Output Total 1125 ml 800 ml 820 ml


 


Balance -1080 ml -494 ml -129 ml











Justicifation of Admission Dx:


Justifications for Admission:


Justification of Admission Dx:  Yes











CLEMENTINA PANTOJA MD           Aug 10, 2020 10:24

## 2020-08-10 NOTE — PDOC
[facility] PROGRESS NOTE


Date of Service


DOS:


DATE: 8/10/20 


TIME: 11:54





Chief Complaint


Chief Complaint


S/P Exp. 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 

parapsilosis & PSAE 07/26 (I-merrem/ceftazidime/AZT/cefepime))


Leucocytosis 


Fevers, intermittent


Acute gallstone pancreatitis with persistent necrosis


Acute hypoxic Respiratory failure required mechanical ventilation


Tracheostomy 


Bilateral pleural effusions/pulm edema s/p Throacentesis on 6/15/2020


HTN 


Intractable pain


Intractable nausea


Covid 19 negative


Acute on chronic anemia 


Abd distention - U/S and CT reviewed s/p 0.4 L of opaque, debris-containing a

scites was removed 5/6


Gallstone pancreatitis with necrosis. 


   -CT A/P 6/6 showed multiple pseudocysts, slight larger on the right. s/p 

drains x 3, 6/7.  + PSAE (MDRO-R Cefepime, Zosyn ANSON < 64) and yeast, 


   -s/p drain 4/27. C. parapsilosis. s/p drain 5/6 + yeast & high amylase; s/p 

additional drain on 5/8. Drains removed. 


Ascites s/p paracentesis 4/15 & 5/6. C. parapsilosis 


JED. off HD. 


A large fluid collection in the pancreatic bed has slightly decreased in size, 

described below, the pancreas itself is difficult to  visualize, which could be 

due to necrosis or obscuration of pancreatic  parenchyma from the surrounding 

fluid collection.6/15 


- 4/27 status post ROBERT drain placement + C paropsilosis. s/p additional drains 

5/8


Hypocalcemia 


Prediabetes


s/p trach


Hyperglycemia


Severe protein-caloric malnutrition


Extensive retroperitoneal fluid collections persist. Percutaneous  drains remain

within the collections in both paracolic gutters. These  communicate with 

additional pelvic and peripancreatic collections.





History of Present Illness


History of Present Illness


08/10/2020


Patient seen and examined


Afebrile


Resting in NAD


Tachycardic and tachypneic overnight


Recurring leukocytosis, 23.0


Chart reviewed


Discussed with RN





08/9: Afebrile.  Calcium down to 10.1.  Little more tachycardic today with some 

more secretions. No O2 requirements. Does not wish to wear her speaking valve. 

Will check CXR.


8/8: Afebrile, weak, having more secretions not wearing a speaking valve today. 

WBC 10, Hb 8.8, , and K3.7, BUN 12, CR 0.5, calcium down to 10.3.  After 

zoledronic acid


8/7: Had a rash after calcitonin injection.  Discussed with nephrology with her 

calcium moving from 11.3-10.9 will give IV bisphosphonate today, zoledronic 

acid.


8/6: Hb stable. Afebrile. Weak. Calcium increased. Shortness of breath is 

improving. Plan for calcitonin today, lasix, and saline


8/5: Hemoglobin abnormally low on repeat has been stable 7.2.  Calcium up to 

10.9.  She is able to work with PT, she is asking to eat.  Social work is been 

having difficulties with both of her daughters completing the financial aspect 

of disability paperwork which is been prolonging her stay over the past 5 

months.  Plan to check PTH and to treat hypercalcemia with 1 L normal saline 40 

mg of IV Lasix and repeat labs in a.m.


8/4: Not wishing to wear her speaking valve. J tube having some difficulties. 

Afebrile. Rolf bedside to aid her. transferred from ICU today


8/3: No overnight events. Calcium 10.7 on AM labs. She is still a bit slow to to

respond, but follows commands well. Does not want speaking valve with me. Pain 

is better controlled in her abdomen. Wound care to see today. Will check liver 

function and phos levels given her calcium elevation.


8/2: Patient seen and examined bedside.  Positive fluid balance in the past 24 

hours.  Patient's chart, labs, images were reviewed and discussed with RN


8/1: No acute events overnight.  Seen and examined at bedside.  Patient had a 

fever 100.2.  Negative fluid balance of 150 cc.  Patient's chart, labs, images 

were reviewed and discussed with RN


7/31: Patient seen and examined at bedside.  No acute events overnight.  

Positive fluid balance 633.  Patient's chart, labs, images were reviewed and 

discussed with RN


7/30: Patient seen and examined bedside.  No acute events overnight.  Patient's 

chart, labs, images were reviewed and discussed with RN


7/28: Patient seen and examined bedside.  Patient appears to be in no obvious 

pain.  All drains have been adequately draining.  Patient continues to be on 

TPN.  Chart labs and imaging was reviewed.  Negative fluid balance of 270 cc


7/27:Patient seen and examined in the ICU.  Patient still requires extensive 

care.  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  Chart, labs, imaging was

reviewed.  UOP with + 500cc balance.


7/25: Patient seen in and examined in the ICU. She is still extremely critically

ill. Appears weak, frail, and pale. Better color today with improved eye contact

and expression. Discussed with RN. Chart reviewed


7/21: Patient seen and examined in the ICU, She is still extremely critically 

ill, Appears extremely weak frail and pale, Discussed with RN, Chart reviewed





Vitals/I&O


Vitals/I&O:





                                   Vital Signs








  Date Time  Temp Pulse Resp B/P (MAP) Pulse Ox O2 Delivery O2 Flow Rate FiO2


 


8/10/20 11:00 99.9 144 24 116/54 (74) 95 Room Air  





 99.9       


 


8/10/20 05:00       2.0 














                                    I & O   


 


 8/9/20 8/9/20 8/10/20





 15:00 23:00 07:00


 


Intake Total 45 ml 306 ml 691 ml


 


Output Total 1125 ml 800 ml 820 ml


 


Balance -1080 ml -494 ml -129 ml











Physical Exam


Physical Exam:


GENERAL: Resting in bed, NAD


HEENT: Pupils equal, oral cavity dry. OC/Op - Dry


NECK:  Tracheostomy - no JVD


LUNGS: Diminished aeration bases,


HEART:  S1, S2, 


ABDOMEN: Less distended, mild- bowel sounds hypoactive, soft, GJ drain present, 

drainage bag in place with depedent drainage


: [place] in place 


EXTREMITIES: Trace generalized edema, no cyanosis. Yeast in groin area


SKIN: warm touch. No signs of rash.  


NEURO: - Alert and responsive


RUE  PICC site without signs of complications. PIV s clean


EC fistula


General:  No acute distress


Heart:  Normal S1, Normal S2, Other


Lungs:  Clear


Abdomen:  Soft


Extremities:  No clubbing, No cyanosis, Other (Diffuse edema)


Skin:  No breakdown





Labs


Labs:





Laboratory Tests








Test


 8/9/20


12:27 8/9/20


17:17 8/9/20


17:51 8/9/20


19:50


 


Glucose (Fingerstick)


 163 mg/dL


(70-99) 164 mg/dL


(70-99) 


 





 


Phosphorus Level


 


 


 2.7 mg/dL


(2.6-4.7) 





 


Lactic Acid Level


 


 


 


 < 0.3 mmol/L


(0.4-2.0)


 


Test


 8/9/20


23:01 8/10/20


03:00 8/10/20


09:45 





 


Glucose (Fingerstick)


 166 mg/dL


(70-99) 


 


 





 


White Blood Count


 


 23.1 x10^3/uL


(4.0-11.0) 


 





 


Red Blood Count


 


 3.29 x10^6/uL


(3.50-5.40) 


 





 


Hemoglobin


 


 9.3 g/dL


(12.0-15.5) 


 





 


Hematocrit


 


 29.2 %


(36.0-47.0) 


 





 


Mean Corpuscular Volume  89 fL ()   


 


Mean Corpuscular Hemoglobin  28 pg (25-35)   


 


Mean Corpuscular Hemoglobin


Concent 


 32 g/dL


(31-37) 


 





 


Red Cell Distribution Width


 


 18.9 %


(11.5-14.5) 


 





 


Platelet Count


 


 620 x10^3/uL


(140-400) 


 





 


Neutrophils (%) (Auto)  82 % (31-73)   


 


Lymphocytes (%) (Auto)  12 % (24-48)   


 


Monocytes (%) (Auto)  6 % (0-9)   


 


Eosinophils (%) (Auto)  0 % (0-3)   


 


Basophils (%) (Auto)  0 % (0-3)   


 


Neutrophils # (Auto)


 


 18.9 x10^3/uL


(1.8-7.7) 


 





 


Lymphocytes # (Auto)


 


 2.9 x10^3/uL


(1.0-4.8) 


 





 


Monocytes # (Auto)


 


 1.3 x10^3/uL


(0.0-1.1) 


 





 


Eosinophils # (Auto)


 


 0.0 x10^3/uL


(0.0-0.7) 


 





 


Basophils # (Auto)


 


 0.1 x10^3/uL


(0.0-0.2) 


 





 


Sodium Level


 


 145 mmol/L


(136-145) 


 





 


Potassium Level


 


 5.6 mmol/L


(3.5-5.1) 


 





 


Chloride Level


 


 111 mmol/L


() 


 





 


Carbon Dioxide Level


 


 26 mmol/L


(21-32) 


 





 


Anion Gap  8 (6-14)   


 


Blood Urea Nitrogen


 


 35 mg/dL


(7-20) 


 





 


Creatinine


 


 1.2 mg/dL


(0.6-1.0) 


 





 


Estimated GFR


(Cockcroft-Gault) 


 47.7 


 


 





 


Glucose Level


 


 138 mg/dL


(70-99) 


 





 


Calcium Level


 


 9.1 mg/dL


(8.5-10.1) 


 





 


Urine Collection Type   Unknown  


 


Urine Color   Jolene  


 


Urine Clarity   Turbid  


 


Urine pH   5.0 (<5.0-8.0)  


 


Urine Specific Gravity


 


 


 >=1.030


(1.000-1.030) 





 


Urine Protein


 


 


 100 mg/dL


(NEG-TRACE) 





 


Urine Glucose (UA)


 


 


 Negative mg/dL


(NEG) 





 


Urine Ketones (Stick)


 


 


 Trace mg/dL


(NEG) 





 


Urine Blood   Large (NEG)  


 


Urine Nitrite   Negative (NEG)  


 


Urine Bilirubin   Negative (NEG)  


 


Urine Urobilinogen Dipstick


 


 


 0.2 mg/dL (0.2


mg/dL) 





 


Urine Leukocyte Esterase   Large (NEG)  


 


Urine RBC


 


 


 Field obscured


/HPF (0-2) 





 


Urine WBC


 


 


 Tntc /HPF


(0-4) 





 


Urine Bacteria


 


 


 Many /HPF


(0-FEW) 





 


Urine Yeast   Present /HPF  











Review of Systems


Review of Systems:


Pertinent as per HPI, otherwise 10 point review of systems is negative.





Assessment and Plan


Assessmemt and Plan


Problems


Medical Problems:


(1) Acute pancreatitis


Status: Acute  





(2) Cholelithiasis


Status: Acute 





ASSESSMENT


S/P Exp. 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 

parapsilosis & PSAE 07/26 (I-merrem/ceftazidime/AZT/cefepime))


Leucocytosis 


Fevers, intermittent


Acute gallstone pancreatitis with persistent necrosis


Acute hypoxic Respiratory failure required mechanical ventilation


Tracheostomy 


Bilateral pleural effusions/pulm edema s/p Throacentesis on 6/15/2020


HTN 


Intractable pain


Intractable nausea


Covid 19 negative


Acute on chronic anemia 


Abd distention - U/S and CT reviewed s/p 0.4 L of opaque, debris-containing 

ascites was removed 5/6


Gallstone pancreatitis with necrosis. 


   -CT A/P 6/6 showed multiple pseudocysts, slight larger on the right. s/p 

drains x 3, 6/7.  + PSAE (MDRO-R Cefepime, Zosyn ANSON < 64) and yeast, 


   -s/p drain 4/27. C. parapsilosis. s/p drain 5/6 + yeast & high amylase; s/p 

additional drain on 5/8. Drains removed. 


Ascites s/p paracentesis 4/15 & 5/6. C. parapsilosis 


JED. off HD. 


A large fluid collection in the pancreatic bed has slightly decreased in size, 

described below, the pancreas itself is difficult to  visualize, which could be 

due to necrosis or obscuration of pancreatic  parenchyma from the surrounding 

fluid collection.6/15 


- 4/27 status post ROBERT drain placement + C paropsilosis. s/p additional drains 

5/8


Hypocalcemia 


Prediabetes


s/p trach


Hyperglycemia


Severe protein-caloric malnutrition


Extensive retroperitoneal fluid collections persist. Percutaneous  drains remain

within the collections in both paracolic gutters. These  communicate with 

additional pelvic and peripancreatic collections.  








PLAN


Cardiac monitoring


Continue current care


J tube feeds 60cc/hr


G tube with suction


[place] to BSD


Wound care


Trend labs


DVT prophylaxis


Full code


Appreciate subspecialist input


Prognosis extremely guarded





Comment


Review of Relevant


I have reviewed the following items josy (where applicable) has been applied.


Medications:





Current Medications








 Medications


  (Trade)  Dose


 Ordered  Sig/Yee


 Route


 PRN Reason  Start Time


 Stop Time Status Last Admin


Dose Admin


 


 Metronidazole  100 ml @ 


 100 mls/hr  Q12HR


 IV


   8/10/20 10:00


    8/10/20 10:04














Justicifation of Admission Dx:


Justifications for Admission:


Justification of Admission Dx:  Yes











HECTOR MASON III DO           Aug 10, 2020 12:09

## 2020-08-10 NOTE — PDOC
Infectious Disease Note


Subjective:


Subjective


awake, says feeling ok, trach


t max 99.7





Vital Signs:


Vital Signs





Vital Signs








  Date Time  Temp Pulse Resp B/P (MAP) Pulse Ox O2 Delivery O2 Flow Rate FiO2


 


8/10/20 08:42     95 Room Air  


 


8/10/20 07:00 99.2 136 20 107/64 (78)    





 99.2       


 


8/10/20 05:00       2.0 











Physical Exam:


PHYSICAL EXAM


GENERAL: pt in bed, appears weak -comfortable -alert


HEENT: Pupils equal, oral cavity dry. OC/Op - Dry


NECK:  Tracheostomy - no JVD


LUNGS: Diminished aeration bases,


HEART:  S1, S2, 


ABDOMEN: Less Distended mild- bowel sounds hypoactive, soft, GJ drain present, 

drainage bag in place with depedent drainage


: [place] in place 


EXTREMITIES: Trace generalized edema, no cyanosis. Yeast in groin area


SKIN: warm touch. No signs of rash.  


NEURO: - Alert and responsive


RUE  PICC site without signs of complications. PIV s clean


EC fistula





Medications:


Inpatient Meds:





Current Medications








 Medications


  (Trade)  Dose


 Ordered  Sig/Yee  Start Time


 Stop Time Status Last Admin


Dose Admin


 


 Acetaminophen


  (Tylenol Supp)  650 mg  PRN Q6HRS  PRN  3/24/20 10:30


    8/9/20 20:07


650 MG


 


 Acetaminophen


  (Tylenol)  650 mg  PRN Q6HRS  PRN  3/21/20 03:36


 5/13/20 10:25 DC 4/16/20 19:56


650 MG


 


 Acetaminophen/


 Hydrocodone Bitart


  (Lortab 5/325)  1 tab  PRN Q4HRS  PRN  7/23/20 16:00


    8/10/20 03:46


1 TAB


 


 Acetylcysteine


  (Mucomyst 20%


 Resp Treatment)  600 mg  RTBID  6/27/20 12:00


    8/10/20 08:42


600 MG


 


 Albumin Human  500 ml @ 


 125 mls/hr  PRN Q1HR  PRN  6/30/20 15:45


 8/3/20 09:52 DC  





 


 Albuterol Sulfate


  (Ventolin Neb


 Soln)  2.5 mg  1X  ONCE  3/17/20 22:30


 3/17/20 22:31 DC 3/18/20 00:56


2.5 MG


 


 Albuterol/


 Ipratropium


  (Duoneb)  3 ml  Q4HRS  6/13/20 08:00


    8/10/20 08:42


3 ML


 


 Alprazolam


  (Xanax)  0.5 mg  PRN QID  PRN  7/23/20 16:00


    8/10/20 03:46


0.5 MG


 


 Alteplase,


 Recombinant


  (Cathflo For


 Central Catheter


 Clearance)  1 mg  1X  ONCE  8/5/20 07:00


 8/5/20 07:01 DC 8/5/20 09:30


1 MG


 


 Alteplase,


 Recombinant 4 mg/


 Sodium Chloride  20 ml @ 20


 mls/hr  1X  ONCE  6/17/20 10:00


 6/17/20 10:59 DC 6/17/20 10:09


20 MLS/HR


 


 Alteplase,


 Recombinant 5 mg/


 Sodium Chloride  30 ml @ 30


 mls/hr  1X  PRN  8/5/20 06:45


   UNV  





 


 Amino Acids/


 Glycerin/


 Electrolytes  1,000 ml @ 


 80 mls/hr  [id number]  7/26/20 10:15


 8/2/20 07:07 DC 8/1/20 18:10


80 MLS/HR


 


 Artificial Tears


  (Artificial


 Tears)  1 drop  PRN Q15MIN  PRN  4/29/20 05:30


    6/23/20 21:17


1 DROP


 


 Atenolol


  (Tenormin)  100 mg  DAILY  3/17/20 09:00


 3/16/20 20:08 DC  





 


 Atropine Sulfate


  (ATROPINE 0.5mg


 SYRINGE)  0.5 mg  PRN Q5MIN  PRN  4/2/20 08:15


 8/3/20 09:45 DC  





 


 Barium Sulfate


  (Varibar Thin


 Liquid Apple)  148 gm  1X  ONCE  5/26/20 11:45


 5/26/20 11:49 DC  





 


 Benzocaine


  (Hurricaine One)  1 spray  1X  ONCE  3/20/20 14:30


 3/20/20 14:31 DC 3/20/20 16:38


1 SPRAY


 


 Bisacodyl


  (Dulcolax Supp)  10 mg  STK-MED ONCE  4/27/20 10:59


 4/27/20 10:59 DC  





 


 Bumetanide


  (Bumex)  2 mg  DAILY  5/8/20 10:00


 5/18/20 17:15 DC 5/18/20 08:07


2 MG


 


 Bupivacaine HCl/


 Epinephrine Bitart


  (Sensorcain-Epi


 0.5%-1:118438 Mpf)  30 ml  STK-MED ONCE  6/30/20 08:34


 6/30/20 08:35 DC  





 


 Calcitonin Remer


  (Miacalcin)  400 unit  BID  8/6/20 18:00


 8/7/20 15:56 DC 8/6/20 18:18


400 UNIT


 


 Calcium Carbonate/


 Glycine


  (Tums)  500 mg  PRN [id number]  PRN  3/18/20 17:45


 5/13/20 10:25 DC  





 


 Calcium Chloride


 1000 mg/Sodium


 Chloride  110 ml @ 


 220 mls/hr  1X  ONCE  3/17/20 22:30


 3/17/20 22:59 DC 3/17/20 22:11


220 MLS/HR


 


 Calcium Chloride


 3000 mg/Sodium


 Chloride  1,030 ml @ 


 50 mls/hr  [id number]  3/19/20 08:00


 3/21/20 15:23 DC 3/21/20 02:17


50 MLS/HR


 


 Calcium Gluconate


  (Calcium


 Gluconate)  2,000 mg  1X  ONCE  3/19/20 02:15


 3/19/20 02:16 DC 3/19/20 02:19


2,000 MG


 


 Calcium Gluconate


 1000 mg/Sodium


 Chloride  110 ml @ 


 220 mls/hr  1X  ONCE  3/18/20 03:30


 3/18/20 03:59 DC 3/18/20 03:21


220 MLS/HR


 


 Calcium Gluconate


 2000 mg/Sodium


 Chloride  120 ml @ 


 220 mls/hr  1X  ONCE  3/18/20 07:30


 3/18/20 08:02 DC 3/18/20 09:05


220 MLS/HR


 


 Cefepime HCl


  (Maxipime)  2 gm  Q12HR  7/22/20 09:00


 7/23/20 07:30 DC 7/22/20 20:53


2 GM


 


 Ceftazidime/


 Avibactam 2.5 gm/


 Sodium Chloride  250 ml @ 


 125 mls/hr  Q8HRS  7/23/20 08:00


 8/5/20 08:20 DC 8/5/20 05:38


125 MLS/HR


 


 Cellulose


  (Surgicel


 Fibrillar 1x2)  1 each  STK-MED ONCE  4/6/20 11:00


 4/6/20 11:01 DC  





 


 Cellulose


  (Surgicel


 Hemostat 2x14)  1 each  STK-MED ONCE  4/27/20 10:58


 4/27/20 10:59 DC  





 


 Cellulose


  (Surgicel


 Hemostat 4x8)  1 each  STK-MED ONCE  4/27/20 10:58


 4/27/20 10:59 DC  





 


 Chlorhexidine


 Gluconate


  (Peridex)  15 ml  BID  6/13/20 09:00


 6/13/20 07:58 DC  





 


 Ciprofloxacin/


 Dextrose  200 ml @ 


 200 mls/hr  Q12HR  7/12/20 10:00


 7/21/20 08:20 DC 7/20/20 21:02


200 MLS/HR


 


 Cyclobenzaprine


 HCl


  (Flexeril)  10 mg  PRN Q6HRS  PRN  4/30/20 10:45


    8/8/20 20:50


10 MG


 


 Daptomycin 410 mg/


 Sodium Chloride  50 ml @ 


 100 mls/hr  Q24H  6/7/20 14:00


 6/10/20 08:30 DC 6/9/20 13:33


100 MLS/HR


 


 Daptomycin 430 mg/


 Sodium Chloride  50 ml @ 


 100 mls/hr  Q24H  4/25/20 13:00


 4/30/20 20:58 DC 4/30/20 13:00


100 MLS/HR


 


 Daptomycin 450 mg/


 Sodium Chloride  50 ml @ 


 100 mls/hr  Q24H  5/17/20 09:00


 5/21/20 08:30 DC 5/20/20 09:25


100 MLS/HR


 


 Daptomycin 485 mg/


 Sodium Chloride  50 ml @ 


 100 mls/hr  Q24H  5/4/20 11:00


 5/12/20 07:44 DC 5/11/20 13:10


100 MLS/HR


 


 Daptomycin 500 mg/


 Sodium Chloride  50 ml @ 


 100 mls/hr  Q24H  7/26/20 09:00


 8/5/20 08:20 DC 8/4/20 09:20


100 MLS/HR


 


 Desflurane


  (Suprane)  90 ml  STK-MED ONCE  6/30/20 10:18


 6/30/20 10:19 DC  





 


 Dexamethasone


 Sodium Phosphate


  (Decadron)  4 mg  STK-MED ONCE  4/27/20 10:56


 4/27/20 10:57 DC  





 


 Dexmedetomidine


 HCl 400 mcg/


 Sodium Chloride  100 ml @ 0


 mls/hr  CONT  PRN  4/2/20 08:15


 5/30/20 18:31 DC 5/30/20 12:57


8 MLS/HR


 


 Dextrose


  (Dextrose


 50%-Water Syringe)  12.5 gm  PRN Q15MIN  PRN  3/16/20 09:30


     





 


 Digoxin


  (Lanoxin)  125 mcg  1X  ONCE  3/19/20 18:00


 3/19/20 18:01 DC 3/19/20 17:10


125 MCG


 


 Diphenhydramine


 HCl


  (Benadryl)  25 mg  1X  ONCE  7/16/20 19:00


 7/16/20 19:01 DC 7/16/20 18:56


25 MG


 


 Duloxetine HCl


  (Cymbalta)  30 mg  DAILY  5/10/20 14:00


 5/13/20 10:25 DC 5/11/20 09:48


30 MG


 


 Enoxaparin Sodium


  (Lovenox 100mg


 Syringe)  100 mg  Q12HR  4/21/20 21:00


   UNV  





 


 Enoxaparin Sodium


  (Lovenox 40mg


 Syringe)  40 mg  Q24H  7/1/20 08:00


    8/9/20 09:12


40 MG


 


 Ephedrine Sulfate


  (ePHEDrine PF IN


 SALINE SYRINGE)  50 mg  STK-MED ONCE  6/30/20 14:45


 6/30/20 14:45 DC  





 


 Etomidate


  (Amidate)  8 mg  1X  ONCE  3/23/20 08:30


 3/23/20 08:31 DC 3/23/20 08:33


8 MG


 


 Fentanyl


  (Duragesic 12mcg/


 Hr Patch)  1 patch  Q3DAYS  7/10/20 09:00


    8/9/20 09:09


1 PATCH


 


 Fentanyl


  (Duragesic 50mcg/


 Hr Patch)  1 patch  Q72H  6/4/20 21:00


 6/13/20 12:00 DC 6/4/20 21:22


1 PATCH


 


 Fentanyl Citrate


  (Fentanyl 2ml


 Vial)  100 mcg  STK-MED ONCE  8/4/20 15:03


 8/4/20 15:03 DC  





 


 Fentanyl Citrate


  (Fentanyl 5ml


 Vial)  250 mcg  1X  ONCE  5/8/20 09:15


 5/8/20 09:16 DC 5/8/20 09:30


50 MCG


 


 Flumazenil


  (Romazicon)  0.5 mg  STK-MED ONCE  6/7/20 14:48


 6/7/20 14:48 DC  





 


 Fluoxetine HCl


  (PROzac)  20 mg  QHS  6/4/20 21:00


    8/9/20 20:07


20 MG


 


 Furosemide


  (Lasix)  40 mg  [id number]  8/6/20 09:00


 8/6/20 14:01 DC 8/6/20 13:51


40 MG


 


 Haloperidol


 Lactate


  (Haldol Inj)  3 mg  1X  ONCE  5/4/20 14:30


 5/4/20 14:31 DC 5/4/20 14:37


3 MG


 


 Heparin Sodium


  (Porcine)


  (Hep Lock Adult)  500 unit  STK-MED ONCE  4/7/20 09:29


 4/7/20 09:30 DC  





 


 Heparin Sodium


  (Porcine)


  (Heparin Sodium)  5,000 unit  Q12HR  4/27/20 21:00


 5/7/20 09:59 DC 5/6/20 20:57


5,000 UNIT


 


 Heparin Sodium


  (Porcine) 1000


 unit/Sodium


 Chloride  1,001 ml @ 


 1,001 mls/hr  1X  ONCE  6/30/20 06:00


 6/30/20 06:59 DC  





 


 Hydromorphone HCl


  (Dilaudid


 Standard PCA)  12 mg  STK-MED ONCE  5/1/20 15:50


 5/12/20 11:24 DC  





 


 Hydromorphone HCl


  (Dilaudid)  1 mg  PRN Q4HRS  PRN  5/4/20 19:00


 5/18/20 17:10 DC 5/18/20 06:25


1 MG


 


 Info


  (CONTRAST GIVEN


 -- Rx MONITORING)  1 each  PRN DAILY  PRN  7/21/20 11:45


 7/23/20 11:44 DC  





 


 Info


  (Icu Electrolyte


 Protocol)  1 ea  CONT PRN  PRN  3/29/20 13:15


     





 


 Info


  (PHARMACY


 MONITORING -- do


 not chart)  1 each  PRN DAILY  PRN  4/24/20 15:45


 5/26/20 14:14 DC  





 


 Info


  (Tpn Per


 Pharmacy)  1 each  PRN DAILY  PRN  3/18/20 12:30


   UNV  





 


 Insulin Human


 Lispro


  (HumaLOG)  0-9 UNITS  Q6HRS  3/16/20 09:30


    8/9/20 18:06


4 UNITS


 


 Insulin Human


 Regular


  (HumuLIN R VIAL)  5 unit  1X  ONCE  3/17/20 22:30


 3/17/20 22:31 DC 3/17/20 22:14


5 UNIT


 


 Iohexol


  (Omnipaque 240


 Mg/ml)  10 ml  1X  ONCE  8/4/20 15:45


 8/4/20 15:46 DC 8/4/20 15:00


10 ML


 


 Iohexol


  (Omnipaque 300


 Mg/ml)  50 ml  1X  ONCE  7/28/20 11:00


 7/28/20 11:01 DC  





 


 Iohexol


  (Omnipaque 350


 Mg/ml)  90 ml  1X  ONCE  3/16/20 03:30


 3/16/20 03:31 DC 3/16/20 03:25


90 ML


 


 Ketorolac


 Tromethamine


  (Toradol 30mg


 Vial)  30 mg  1X  ONCE  3/16/20 03:00


 3/16/20 03:01 DC 3/16/20 02:54


30 MG


 


 Lidocaine HCl


  (Buffered


 Lidocaine 1%)  5 ml  1X  ONCE  8/4/20 15:45


 8/4/20 15:46 DC 8/4/20 15:00


5 ML


 


 Lidocaine HCl


  (Glydo


  (Lidocaine) Jelly)  1 thomas  1X  ONCE  3/20/20 14:30


 3/20/20 14:31 DC 3/20/20 16:38


1 THOMAS


 


 Lidocaine HCl


  (Lidocaine 1%


 20ml Vial)  20 ml  1X  ONCE  6/7/20 15:00


 6/7/20 15:01 DC 6/7/20 15:30


20 ML


 


 Lidocaine HCl


  (Lidocaine Pf 2%


 Vial)  5 ml  STK-MED ONCE  6/30/20 07:44


 6/30/20 07:44 DC  





 


 Lidocaine HCl


  (Xylocaine-Mpf


 1% 2ml Vial)  2 ml  PRN 1X  PRN  4/27/20 07:00


 4/28/20 06:59 DC  





 


 Linezolid/Dextrose  300 ml @ 


 300 mls/hr  Q12HR  5/17/20 09:00


 5/20/20 08:11 DC 5/19/20 21:08


300 MLS/HR


 


 Lorazepam


  (Ativan Inj)  0.25 mg  PRN Q4HRS  PRN  6/3/20 07:30


    8/9/20 23:15


0.25 MG


 


 Magnesium Sulfate  50 ml @ 25


 mls/hr  1X  ONCE  6/30/20 16:30


 6/30/20 18:29 DC 6/30/20 17:02


25 MLS/HR


 


 Meropenem 1 gm/


 Sodium Chloride  100 ml @ 


 200 mls/hr  Q12HR  6/7/20 21:00


 6/25/20 08:56 DC 6/25/20 08:27


200 MLS/HR


 


 Meropenem 500 mg/


 Sodium Chloride  50 ml @ 


 100 mls/hr  Q6HRS  6/28/20 18:00


 7/21/20 08:23 DC 7/21/20 06:15


100 MLS/HR


 


 Methylprednisolone


 Sodium Succinate


  (SOLU-Medrol


 125MG VIAL)  125 mg  1X  ONCE  6/13/20 06:15


 6/13/20 06:16 DC 6/13/20 06:26


125 MG


 


 Metoclopramide HCl


  (Reglan Vial)  10 mg  PRN Q3HRS  PRN  5/9/20 16:45


    5/14/20 04:25


10 MG


 


 Metoprolol


 Tartrate


  (Lopressor Vial)  5 mg  1X  ONCE  8/9/20 10:45


 8/9/20 10:46 DC 8/9/20 10:54


5 MG


 


 Metronidazole  100 ml @ 


 100 mls/hr  Q8HRS  4/14/20 10:00


 4/21/20 08:10 DC 4/21/20 06:04


100 MLS/HR


 


 Micafungin Sodium


 100 mg/Dextrose  100 ml @ 


 100 mls/hr  Q24H  6/30/20 08:30


 8/5/20 08:20 DC 8/4/20 09:30


100 MLS/HR


 


 Midazolam HCl


  (Versed)  2 mg  1X  ONCE  6/7/20 15:00


 6/7/20 15:01 DC 6/7/20 15:28


1 MG


 


 Midazolam HCl 100


 mg/Sodium Chloride  100 ml @ 1


 mls/hr  CONT  PRN  6/30/20 14:45


 8/3/20 09:45 DC 7/3/20 18:48


10 MLS/HR


 


 Midazolam HCl 50


 mg/Sodium Chloride  50 ml @ 0


 mls/hr  CONT  PRN  3/23/20 08:15


 3/28/20 15:59 DC 3/26/20 22:39


7 MLS/HR


 


 Morphine Sulfate


  (Morphine


 Sulfate)  1 mg  PRN Q1HR  PRN  6/30/20 14:45


 8/3/20 09:45 DC 7/25/20 18:28


1 MG


 


 Multi-Ingred


 Cream/Lotion/Oil/


 Oint


  (Artificial


 Tears Eye


 Ointment)  1 thomas  PRN Q1HR  PRN  3/25/20 17:30


 6/3/20 14:39 DC 4/13/20 08:19


1 THOMAS


 


 Multivitamins/


 Minerals


 Therapeutic


  (Centrum


 Multivit-Mineral


 Liq)  5 ml  DAILY  8/5/20 09:00


    8/9/20 09:10


5 ML


 


 Naloxone HCl


  (Narcan)  0.4 mg  PRN Q2MIN  PRN  6/30/20 14:45


 8/3/20 09:45 DC  





 


 Norepinephrine


 Bitartrate 8 mg/


 Dextrose  258 ml @ 


 13.332 mls/


 hr  CONT  PRN  6/7/20 06:30


 8/3/20 09:45 DC 7/2/20 09:09


1.6 MLS/HR


 


 Ondansetron HCl


  (Zofran)  4 mg  STK-MED ONCE  6/30/20 13:33


 6/30/20 13:33 DC  





 


 Pantoprazole


 Sodium


  (PROTONIX VIAL


 for IV PUSH)  40 mg  DAILYAC  3/16/20 11:30


    8/9/20 09:10


40 MG


 


 Phenylephrine HCl


  (Ken-Synephrine


 Inj)  10 mg  STK-MED ONCE  6/30/20 13:33


 6/30/20 13:33 DC  





 


 Phenylephrine HCl


  (PHENYLEPHRINE


 in 0.9% NACL PF)  1 mg  STK-MED ONCE  6/30/20 14:44


 6/30/20 14:45 DC  





 


 Piperacillin Sod/


 Tazobactam Sod


 3.375 gm/Sodium


 Chloride  50 ml @ 


 100 mls/hr  Q6HRS  5/27/20 12:00


 6/4/20 07:26 DC 6/4/20 06:10


100 MLS/HR


 


 Piperacillin Sod/


 Tazobactam Sod


 4.5 gm/Sodium


 Chloride  100 ml @ 


 200 mls/hr  1X  ONCE  3/16/20 06:00


 3/16/20 06:29 DC 3/16/20 05:44


200 MLS/HR


 


 Potassium


 Chloride 110 meq/


 Magnesium Sulfate


 20 meq/


 Multivitamins 10


 ml/Chromium/


 Copper/Manganese/


 Seleni/Zn 1 ml/


 Insulin Human


 Regular 15 unit/


 Total Parenteral


 Nutrition/Amino


 Acids/Dextrose/


 Fat Emulsion


 Intravenous  1,800 ml @ 


 75 mls/hr  TPN  CONT  5/24/20 22:00


 5/25/20 21:59 DC 5/24/20 22:48


75 MLS/HR


 


 Potassium


 Chloride 15 meq/


 Bicarbonate


 Dialysis Soln w/


 out KCl  5,007.5 ml


  @ 1,000 mls/


 hr  Q5H1M  3/29/20 20:00


 4/2/20 13:08 DC 4/1/20 18:14


1,000 MLS/HR


 


 Potassium


 Chloride 20 meq/


 Bicarbonate


 Dialysis Soln w/


 out KCl  5,010 ml @ 


 1,000 mls/hr  Q5H1M  3/25/20 16:00


 3/29/20 19:59 DC 3/29/20 14:54


1,000 MLS/HR


 


 Potassium


 Chloride 40 meq/


 Potassium Acetate


 60 meq/Magnesium


 Sulfate 10 meq/


 Multivitamins 10


 ml/Chromium/


 Copper/Manganese/


 Seleni/Zn 1 ml/


 Insulin Human


 Regular 20 unit/


 Total Parenteral


 Nutrition/Amino


 Acids/Dextrose/


 Fat Emulsion


 Intravenous  1,800 ml @ 


 75 mls/hr  TPN  CONT  6/4/20 22:00


 6/5/20 21:59 DC 6/5/20 00:03


75 MLS/HR


 


 Potassium


 Chloride 70 meq/


 Magnesium Sulfate


 20 meq/


 Multivitamins 10


 ml/Chromium/


 Copper/Manganese/


 Seleni/Zn 1 ml/


 Insulin Human


 Regular 15 unit/


 Total Parenteral


 Nutrition/Amino


 Acids/Dextrose/


 Fat Emulsion


 Intravenous  1,800 ml @ 


 75 mls/hr  TPN  CONT  5/29/20 22:00


 5/30/20 21:59 DC 5/29/20 23:13


75 MLS/HR


 


 Potassium


 Chloride 75 meq/


 Magnesium Sulfate


 15 meq/


 Multivitamins 10


 ml/Chromium/


 Copper/Manganese/


 Seleni/Zn 0.5 ml/


 Insulin Human


 Regular 15 unit/


 Total Parenteral


 Nutrition/Amino


 Acids/Dextrose/


 Fat Emulsion


 Intravenous  1,920 ml @ 


 80 mls/hr  TPN  CONT  5/9/20 22:00


 5/10/20 21:59 DC 5/9/20 22:41


80 MLS/HR


 


 Potassium


 Chloride 75 meq/


 Magnesium Sulfate


 15 meq/Calcium


 Gluconate 8 meq/


 Multivitamins 10


 ml/Chromium/


 Copper/Manganese/


 Seleni/Zn 0.5 ml/


 Insulin Human


 Regular 15 unit/


 Total Parenteral


 Nutrition/Amino


 Acids/Dextrose/


 Fat Emulsion


 Intravenous  1,920 ml @ 


 80 mls/hr  TPN  CONT  5/7/20 22:00


 5/8/20 21:59 DC 5/7/20 22:28


80 MLS/HR


 


 Potassium


 Chloride 75 meq/


 Magnesium Sulfate


 15 meq/Calcium


 Gluconate 8 meq/


 Multivitamins 10


 ml/Chromium/


 Copper/Manganese/


 Seleni/Zn 0.5 ml/


 Insulin Human


 Regular 20 unit/


 Total Parenteral


 Nutrition/Amino


 Acids/Dextrose/


 Fat Emulsion


 Intravenous  1,920 ml @ 


 80 mls/hr  TPN  CONT  5/6/20 22:00


 5/7/20 21:59 DC 5/6/20 22:00


80 MLS/HR


 


 Potassium


 Chloride 75 meq/


 Magnesium Sulfate


 15 meq/Calcium


 Gluconate 8 meq/


 Multivitamins 10


 ml/Chromium/


 Copper/Manganese/


 Seleni/Zn 0.5 ml/


 Insulin Human


 Regular 25 unit/


 Total Parenteral


 Nutrition/Amino


 Acids/Dextrose/


 Fat Emulsion


 Intravenous  1,920 ml @ 


 80 mls/hr  TPN  CONT  5/4/20 22:00


 5/5/20 21:59 DC 5/4/20 23:08


80 MLS/HR


 


 Potassium


 Chloride 75 meq/


 Magnesium Sulfate


 20 meq/Calcium


 Gluconate 10 meq/


 Multivitamins 10


 ml/Chromium/


 Copper/Manganese/


 Seleni/Zn 0.5 ml/


 Insulin Human


 Regular 25 unit/


 Total Parenteral


 Nutrition/Amino


 Acids/Dextrose/


 Fat Emulsion


 Intravenous  1,920 ml @ 


 80 mls/hr  TPN  CONT  5/3/20 22:00


 5/4/20 21:59 DC 5/3/20 22:04


80 MLS/HR


 


 Potassium


 Chloride 75 meq/


 Magnesium Sulfate


 20 meq/Calcium


 Gluconate 10 meq/


 Multivitamins 10


 ml/Chromium/


 Copper/Manganese/


 Seleni/Zn 0.5 ml/


 Insulin Human


 Regular 30 unit/


 Total Parenteral


 Nutrition/Amino


 Acids/Dextrose/


 Fat Emulsion


 Intravenous  1,920 ml @ 


 80 mls/hr  TPN  CONT  5/2/20 22:00


 5/3/20 22:00 DC 5/2/20 21:51


80 MLS/HR


 


 Potassium


 Chloride 80 meq/


 Magnesium Sulfate


 20 meq/


 Multivitamins 10


 ml/Chromium/


 Copper/Manganese/


 Seleni/Zn 0.5 ml/


 Insulin Human


 Regular 15 unit/


 Total Parenteral


 Nutrition/Amino


 Acids/Dextrose/


 Fat Emulsion


 Intravenous  1,920 ml @ 


 80 mls/hr  TPN  CONT  5/12/20 22:00


 5/13/20 21:59 DC 5/12/20 21:40


80 MLS/HR


 


 Potassium


 Chloride 80 meq/


 Magnesium Sulfate


 20 meq/


 Multivitamins 10


 ml/Chromium/


 Copper/Manganese/


 Seleni/Zn 1 ml/


 Insulin Human


 Regular 15 unit/


 Total Parenteral


 Nutrition/Amino


 Acids/Dextrose/


 Fat Emulsion


 Intravenous  1,800 ml @ 


 75 mls/hr  TPN  CONT  5/31/20 22:00


 6/1/20 21:59 DC 5/31/20 21:54


75 MLS/HR


 


 Potassium


 Chloride 90 meq/


 Magnesium Sulfate


 20 meq/


 Multivitamins 10


 ml/Chromium/


 Copper/Manganese/


 Seleni/Zn 1 ml/


 Insulin Human


 Regular 15 unit/


 Total Parenteral


 Nutrition/Amino


 Acids/Dextrose/


 Fat Emulsion


 Intravenous  1,800 ml @ 


 75 mls/hr  TPN  CONT  5/20/20 22:00


 5/21/20 21:59 DC 5/20/20 22:28


75 MLS/HR


 


 Potassium


 Chloride 90 meq/


 Magnesium Sulfate


 20 meq/


 Multivitamins 10


 ml/Chromium/


 Copper/Manganese/


 Seleni/Zn 1 ml/


 Insulin Human


 Regular 20 unit/


 Total Parenteral


 Nutrition/Amino


 Acids/Dextrose/


 Fat Emulsion


 Intravenous  1,800 ml @ 


 75 mls/hr  TPN  CONT  6/3/20 22:00


 6/4/20 21:59 DC 6/3/20 23:13


75 MLS/HR


 


 Potassium


 Chloride/Water  100 ml @ 


 100 mls/hr  Q1H  6/17/20 08:00


 6/17/20 09:59 DC 6/17/20 09:12


100 MLS/HR


 


 Potassium


 Phosphate 20 mmol/


 Sodium Chloride  106.6667


 ml @ 


 51.667 m...  1X  ONCE  3/25/20 13:00


 3/25/20 15:03 DC 3/25/20 12:51


51.667 MLS/HR


 


 Potassium Acetate


 30 meq/Magnesium


 Sulfate 14 meq/


 Multivitamins 10


 ml/Chromium/


 Copper/Manganese/


 Seleni/Zn 1 ml/


 Insulin Human


 Regular 15 unit/


 Sodium Chloride


 20 meq/Potassium


 Chloride 30 meq/


 Total Parenteral


 Nutrition/Amino


 Acids/Dextrose/


 Fat Emulsion


 Intravenous  1,920 ml @ 


 80 mls/hr  TPN  CONT  6/23/20 22:00


 6/24/20 21:59 DC 6/23/20 21:46


80 MLS/HR


 


 Potassium Acetate


 30 meq/Magnesium


 Sulfate 20 meq/


 Calcium Gluconate


 10 meq/


 Multivitamins 10


 ml/Chromium/


 Copper/Manganese/


 Seleni/Zn 0.5 ml/


 Insulin Human


 Regular 30 unit/


 Potassium


 Chloride 30 meq/


 Total Parenteral


 Nutrition/Amino


 Acids/Dextrose/


 Fat Emulsion


 Intravenous  1,920 ml @ 


 80 mls/hr  TPN  CONT  5/1/20 22:00


 5/2/20 21:59 DC 5/1/20 22:34


80 MLS/HR


 


 Potassium Acetate


 40 meq/Magnesium


 Sulfate 10 meq/


 Multivitamins 10


 ml/Chromium/


 Copper/Manganese/


 Seleni/Zn 1 ml/


 Insulin Human


 Regular 20 unit/


 Total Parenteral


 Nutrition/Amino


 Acids/Dextrose/


 Fat Emulsion


 Intravenous  1,920 ml @ 


 80 mls/hr  TPN  CONT  6/16/20 22:00


 6/17/20 21:59 DC 6/16/20 21:32


80 MLS/HR


 


 Potassium Acetate


 40 meq/Magnesium


 Sulfate 5 meq/


 Multivitamins 10


 ml/Chromium/


 Copper/Manganese/


 Seleni/Zn 1 ml/


 Insulin Human


 Regular 30 unit/


 Total Parenteral


 Nutrition/Amino


 Acids/Dextrose/


 Fat Emulsion


 Intravenous  1,920 ml @ 


 80 mls/hr  TPN  CONT  6/15/20 22:00


 6/16/20 19:34 DC 6/15/20 21:54


80 MLS/HR


 


 Potassium Acetate


 55 meq/Magnesium


 Sulfate 20 meq/


 Calcium Gluconate


 10 meq/


 Multivitamins 10


 ml/Chromium/


 Copper/Manganese/


 Seleni/Zn 0.5 ml/


 Insulin Human


 Regular 30 unit/


 Total Parenteral


 Nutrition/Amino


 Acids/Dextrose/


 Fat Emulsion


 Intravenous  1,920 ml @ 


 80 mls/hr  TPN  CONT  4/30/20 22:00


 5/1/20 21:59 DC 5/1/20 01:00


80 MLS/HR


 


 Potassium Acetate


 55 meq/Magnesium


 Sulfate 20 meq/


 Calcium Gluconate


 10 meq/


 Multivitamins 10


 ml/Chromium/


 Copper/Manganese/


 Seleni/Zn 0.5 ml/


 Insulin Human


 Regular 35 unit/


 Total Parenteral


 Nutrition/Amino


 Acids/Dextrose/


 Fat Emulsion


 Intravenous  1,920 ml @ 


 80 mls/hr  TPN  CONT  4/28/20 22:00


 4/29/20 21:59 DC 4/28/20 22:02


80 MLS/HR


 


 Potassium Acetate


 60 meq/Magnesium


 Sulfate 10 meq/


 Multivitamins 10


 ml/Chromium/


 Copper/Manganese/


 Seleni/Zn 1 ml/


 Insulin Human


 Regular 20 unit/


 Total Parenteral


 Nutrition/Amino


 Acids/Dextrose/


 Fat Emulsion


 Intravenous  1,920 ml @ 


 80 mls/hr  TPN  CONT  6/17/20 22:00


 6/18/20 21:59 DC 6/17/20 21:55


80 MLS/HR


 


 Potassium Acetate


 60 meq/Magnesium


 Sulfate 14 meq/


 Multivitamins 10


 ml/Chromium/


 Copper/Manganese/


 Seleni/Zn 1 ml/


 Insulin Human


 Regular 15 unit/


 Sodium Chloride


 20 meq/Total


 Parenteral


 Nutrition/Amino


 Acids/Dextrose/


 Fat Emulsion


 Intravenous  1,920 ml @ 


 80 mls/hr  TPN  CONT  6/22/20 22:00


 6/23/20 21:59 DC 6/22/20 21:54


80 MLS/HR


 


 Potassium Acetate


 60 meq/Magnesium


 Sulfate 14 meq/


 Multivitamins 10


 ml/Chromium/


 Copper/Manganese/


 Seleni/Zn 1 ml/


 Insulin Human


 Regular 15 unit/


 Total Parenteral


 Nutrition/Amino


 Acids/Dextrose/


 Fat Emulsion


 Intravenous  1,920 ml @ 


 80 mls/hr  TPN  CONT  6/21/20 22:00


 6/22/20 21:59 DC 6/21/20 22:22


80 MLS/HR


 


 Potassium Acetate


 60 meq/Magnesium


 Sulfate 14 meq/


 Multivitamins 10


 ml/Chromium/


 Copper/Manganese/


 Seleni/Zn 1 ml/


 Insulin Human


 Regular 20 unit/


 Total Parenteral


 Nutrition/Amino


 Acids/Dextrose/


 Fat Emulsion


 Intravenous  1,920 ml @ 


 80 mls/hr  TPN  CONT  6/18/20 22:00


 6/19/20 21:59 DC 6/18/20 22:26


80 MLS/HR


 


 Potassium Acetate


 60 meq/Magnesium


 Sulfate 5 meq/


 Multivitamins 10


 ml/Chromium/


 Copper/Manganese/


 Seleni/Zn 1 ml/


 Insulin Human


 Regular 30 unit/


 Total Parenteral


 Nutrition/Amino


 Acids/Dextrose/


 Fat Emulsion


 Intravenous  1,920 ml @ 


 80 mls/hr  TPN  CONT  6/6/20 22:00


 6/7/20 21:59 DC 6/6/20 21:54


80 MLS/HR


 


 Potassium Acetate


 65 meq/Magnesium


 Sulfate 20 meq/


 Calcium Gluconate


 10 meq/


 Multivitamins 10


 ml/Chromium/


 Copper/Manganese/


 Seleni/Zn 0.5 ml/


 Insulin Human


 Regular 30 unit/


 Total Parenteral


 Nutrition/Amino


 Acids/Dextrose/


 Fat Emulsion


 Intravenous  1,920 ml @ 


 80 mls/hr  TPN  CONT  4/29/20 22:00


 4/30/20 21:59 DC 4/29/20 22:22


80 MLS/HR


 


 Potassium Acetate


 80 meq/Magnesium


 Sulfate 5 meq/


 Multivitamins 10


 ml/Chromium/


 Copper/Manganese/


 Seleni/Zn 1 ml/


 Insulin Human


 Regular 20 unit/


 Total Parenteral


 Nutrition/Amino


 Acids/Dextrose/


 Fat Emulsion


 Intravenous  1,920 ml @ 


 80 mls/hr  TPN  CONT  6/5/20 22:00


 6/6/20 21:59 DC 6/5/20 21:59


80 MLS/HR


 


 Prochlorperazine


 Edisylate


  (Compazine)  5 mg  PACU PRN  PRN  4/27/20 07:00


 4/28/20 06:59 DC  





 


 Propofol


  (Diprivan)  200 mg  STK-MED ONCE  6/30/20 07:44


 6/30/20 07:44 DC  





 


 Ringer's Solution  1,000 ml @ 


 30 mls/hr  Q24H  4/27/20 07:00


 4/27/20 18:59 DC  





 


 Rocuronium Bromide


  (Zemuron)  100 mg  STK-MED ONCE  6/30/20 07:44


 6/30/20 07:44 DC  





 


 Saliva Substitute


  (Biotene


 Moisturizing


 Mouth)  2 spray  PRN Q15MIN  PRN  5/21/20 11:00


     





 


 Sevoflurane


  (Ultane)  60 ml  STK-MED ONCE  4/27/20 12:26


 4/27/20 12:27 DC  





 


 Sodium


 Bicarbonate 150


 meq/Dextrose  1,150 ml @ 


 75 mls/hr  1X  ONCE  6/30/20 16:30


 7/1/20 07:49 DC 6/30/20 20:02


75 MLS/HR


 


 Sodium


 Bicarbonate 50


 meq/Sodium


 Chloride  1,050 ml @ 


 75 mls/hr  Q14H  3/18/20 07:30


 3/23/20 10:28 DC 3/22/20 21:10


75 MLS/HR


 


 Sodium Acetate 50


 meq/Potassium


 Acetate 55 meq/


 Magnesium Sulfate


 20 meq/Calcium


 Gluconate 10 meq/


 Multivitamins 10


 ml/Chromium/


 Copper/Manganese/


 Seleni/Zn 0.5 ml/


 Insulin Human


 Regular 35 unit/


 Total Parenteral


 Nutrition/Amino


 Acids/Dextrose/


 Fat Emulsion


 Intravenous  1,800 ml @ 


 75 mls/hr  TPN  CONT  4/25/20 22:00


 4/26/20 21:59 DC 4/25/20 22:03


75 MLS/HR


 


 Sodium Bicarbonate


  (Sodium Bicarb


 Adult 8.4% Syr)  100 meq  1X  ONCE  6/30/20 16:30


 6/30/20 16:31 DC 6/30/20 17:07


100 MEQ


 


 Sodium Chloride  1,000 ml @ 


 125 mls/hr  1X  ONCE  8/6/20 07:45


 8/6/20 15:44 DC 8/6/20 08:20


125 MLS/HR


 


 Sodium Chloride


  (Normal Saline


 Flush)  3 ml  QSHIFT  PRN  6/30/20 14:45


 8/3/20 09:45 DC  





 


 Sodium Chloride


 80 meq/Potassium


 Chloride 30 meq/


 Potassium Acetate


 30 meq/Magnesium


 Sulfate 14 meq/


 Multivitamins 10


 ml/Chromium/


 Copper/Manganese/


 Seleni/Zn 1 ml/


 Insulin Human


 Regular 15 unit/


 Total Parenteral


 Nutrition/Amino


 Acids/Dextrose/


 Fat Emulsion


 Intravenous  1,920 ml @ 


 80 mls/hr  TPN  CONT  7/1/20 22:00


 7/2/20 21:59 DC 7/1/20 23:05


80 MLS/HR


 


 Sodium Chloride


 90 meq/Calcium


 Gluconate 10 meq/


 Multivitamins 10


 ml/Chromium/


 Copper/Manganese/


 Seleni/Zn 0.5 ml/


 Total Parenteral


 Nutrition/Amino


 Acids/Dextrose/


 Fat Emulsion


 Intravenous  1,512 ml @ 


 63 mls/hr  TPN  CONT  3/18/20 22:00


 3/19/20 21:59 DC 3/18/20 22:06


63 MLS/HR


 


 Sodium Chloride


 90 meq/Calcium


 Gluconate 10 meq/


 Multivitamins 10


 ml/Chromium/


 Copper/Manganese/


 Seleni/Zn 1 ml/


 Total Parenteral


 Nutrition/Amino


 Acids/Dextrose/


 Fat Emulsion


 Intravenous  55.005 ml


  @ 2.292


 mls/hr  TPN  CONT  3/18/20 22:00


 3/18/20 12:33 DC  





 


 Sodium Chloride


 90 meq/Magnesium


 Sulfate 10 meq/


 Calcium Gluconate


 20 meq/


 Multivitamins 10


 ml/Chromium/


 Copper/Manganese/


 Seleni/Zn 0.5 ml/


 Total Parenteral


 Nutrition/Amino


 Acids/Dextrose/


 Fat Emulsion


 Intravenous  1,512 ml @ 


 63 mls/hr  TPN  CONT  3/19/20 22:00


 3/20/20 21:59 DC 3/19/20 22:25


63 MLS/HR


 


 Sodium Chloride


 90 meq/Magnesium


 Sulfate 12 meq/


 Calcium Gluconate


 15 meq/


 Multivitamins 10


 ml/Chromium/


 Copper/Manganese/


 Seleni/Zn 0.5 ml/


 Insulin Human


 Regular 25 unit/


 Total Parenteral


 Nutrition/Amino


 Acids/Dextrose/


 Fat Emulsion


 Intravenous  1,400 ml @ 


 58.333 mls/


 hr  TPN  CONT  4/8/20 22:00


 4/9/20 21:59 DC 4/8/20 21:41


58.333 MLS/HR


 


 Sodium Chloride


 90 meq/Potassium


 Chloride 15 meq/


 Magnesium Sulfate


 12 meq/Calcium


 Gluconate 15 meq/


 Multivitamins 10


 ml/Chromium/


 Copper/Manganese/


 Seleni/Zn 0.5 ml/


 Insulin Human


 Regular 25 unit/


 Total Parenteral


 Nutrition/Amino


 Acids/Dextrose/


 Fat Emulsion


 Intravenous  1,400 ml @ 


 58.333 mls/


 hr  TPN  CONT  4/7/20 22:00


 4/8/20 21:59 DC 4/7/20 22:13


58.333 MLS/HR


 


 Sodium Chloride


 90 meq/Potassium


 Chloride 15 meq/


 Potassium


 Phosphate 10 mmol/


 Magnesium Sulfate


 8 meq/Calcium


 Gluconate 15 meq/


 Multivitamins 10


 ml/Chromium/


 Copper/Manganese/


 Seleni/Zn 0.5 ml/


 Insulin Human


 Regular 25 unit/


 Total Parenteral


 Nutrition/Amino


 Acids/Dextrose/


 Fat Emulsion


 Intravenous  1,400 ml @ 


 58.333 mls/


 hr  TPN  CONT  4/5/20 22:00


 4/6/20 21:59 DC 4/5/20 21:20


58.333 MLS/HR


 


 Sodium Chloride


 90 meq/Potassium


 Chloride 15 meq/


 Potassium


 Phosphate 10 mmol/


 Magnesium Sulfate


 10 meq/Calcium


 Gluconate 20 meq/


 Multivitamins 10


 ml/Chromium/


 Copper/Manganese/


 Seleni/Zn 0.5 ml/


 Total Parenteral


 Nutrition/Amino


 Acids/Dextrose/


 Fat Emulsion


 Intravenous  1,400 ml @ 


 58.333 mls/


 hr  TPN  CONT  3/23/20 22:00


 3/24/20 21:59 DC 3/23/20 21:42


58.333 MLS/HR


 


 Sodium Chloride


 90 meq/Potassium


 Chloride 15 meq/


 Potassium


 Phosphate 10 mmol/


 Magnesium Sulfate


 12 meq/Calcium


 Gluconate 15 meq/


 Multivitamins 10


 ml/Chromium/


 Copper/Manganese/


 Seleni/Zn 0.5 ml/


 Insulin Human


 Regular 25 unit/


 Total Parenteral


 Nutrition/Amino


 Acids/Dextrose/


 Fat Emulsion


 Intravenous  1,400 ml @ 


 58.333 mls/


 hr  TPN  CONT  4/6/20 22:00


 4/7/20 21:59 DC 4/6/20 22:24


58.333 MLS/HR


 


 Sodium Chloride


 90 meq/Potassium


 Chloride 15 meq/


 Potassium


 Phosphate 15 mmol/


 Magnesium Sulfate


 10 meq/Calcium


 Gluconate 15 meq/


 Multivitamins 10


 ml/Chromium/


 Copper/Manganese/


 Seleni/Zn 0.5 ml/


 Total Parenteral


 Nutrition/Amino


 Acids/Dextrose/


 Fat Emulsion


 Intravenous  1,400 ml @ 


 58.333 mls/


 hr  TPN  CONT  3/24/20 22:00


 3/25/20 21:59 DC 3/24/20 22:17


58.333 MLS/HR


 


 Sodium Chloride


 90 meq/Potassium


 Chloride 15 meq/


 Potassium


 Phosphate 15 mmol/


 Magnesium Sulfate


 10 meq/Calcium


 Gluconate 20 meq/


 Multivitamins 10


 ml/Chromium/


 Copper/Manganese/


 Seleni/Zn 0.5 ml/


 Total Parenteral


 Nutrition/Amino


 Acids/Dextrose/


 Fat Emulsion


 Intravenous  1,200 ml @ 


 50 mls/hr  TPN  CONT  3/22/20 22:00


 3/22/20 14:17 DC  





 


 Sodium Chloride


 90 meq/Potassium


 Chloride 15 meq/


 Potassium


 Phosphate 18 mmol/


 Magnesium Sulfate


 8 meq/Calcium


 Gluconate 15 meq/


 Multivitamins 10


 ml/Chromium/


 Copper/Manganese/


 Seleni/Zn 0.5 ml/


 Insulin Human


 Regular 10 unit/


 Total Parenteral


 Nutrition/Amino


 Acids/Dextrose/


 Fat Emulsion


 Intravenous  1,400 ml @ 


 58.333 mls/


 hr  TPN  CONT  3/27/20 22:00


 3/28/20 21:59 DC 3/27/20 21:43


58.333 MLS/HR


 


 Sodium Chloride


 90 meq/Potassium


 Chloride 15 meq/


 Potassium


 Phosphate 18 mmol/


 Magnesium Sulfate


 8 meq/Calcium


 Gluconate 15 meq/


 Multivitamins 10


 ml/Chromium/


 Copper/Manganese/


 Seleni/Zn 0.5 ml/


 Insulin Human


 Regular 15 unit/


 Total Parenteral


 Nutrition/Amino


 Acids/Dextrose/


 Fat Emulsion


 Intravenous  1,400 ml @ 


 58.333 mls/


 hr  TPN  CONT  3/30/20 22:00


 3/31/20 21:59 DC 3/30/20 21:47


58.333 MLS/HR


 


 Sodium Chloride


 90 meq/Potassium


 Chloride 15 meq/


 Potassium


 Phosphate 18 mmol/


 Magnesium Sulfate


 8 meq/Calcium


 Gluconate 15 meq/


 Multivitamins 10


 ml/Chromium/


 Copper/Manganese/


 Seleni/Zn 0.5 ml/


 Insulin Human


 Regular 20 unit/


 Total Parenteral


 Nutrition/Amino


 Acids/Dextrose/


 Fat Emulsion


 Intravenous  1,400 ml @ 


 58.333 mls/


 hr  TPN  CONT  4/2/20 22:00


 4/3/20 21:59 DC 4/2/20 22:45


58.333 MLS/HR


 


 Sodium Chloride


 90 meq/Potassium


 Chloride 15 meq/


 Potassium


 Phosphate 18 mmol/


 Magnesium Sulfate


 8 meq/Calcium


 Gluconate 15 meq/


 Multivitamins 10


 ml/Chromium/


 Copper/Manganese/


 Seleni/Zn 0.5 ml/


 Total Parenteral


 Nutrition/Amino


 Acids/Dextrose/


 Fat Emulsion


 Intravenous  1,400 ml @ 


 58.333 mls/


 hr  TPN  CONT  3/26/20 22:00


 3/27/20 21:59 DC 3/26/20 22:00


58.333 MLS/HR


 


 Sodium Chloride


 90 meq/Potassium


 Chloride 30 meq/


 Potassium Acetate


 30 meq/Magnesium


 Sulfate 15 meq/


 Multivitamins 10


 ml/Chromium/


 Copper/Manganese/


 Seleni/Zn 1 ml/


 Insulin Human


 Regular 15 unit/


 Total Parenteral


 Nutrition/Amino


 Acids/Dextrose/


 Fat Emulsion


 Intravenous  1,680 ml @ 


 70 mls/hr  TPN  CONT  7/16/20 22:00


 7/17/20 21:59 DC 7/16/20 22:06


70 MLS/HR


 


 Sodium Chloride


 90 meq/Potassium


 Phosphate 15 mmol/


 Magnesium Sulfate


 12 meq/Calcium


 Gluconate 15 meq/


 Multivitamins 10


 ml/Chromium/


 Copper/Manganese/


 Seleni/Zn 0.5 ml/


 Insulin Human


 Regular 30 unit/


 Total Parenteral


 Nutrition/Amino


 Acids/Dextrose/


 Fat Emulsion


 Intravenous  1,400 ml @ 


 58.333 mls/


 hr  TPN  CONT  4/10/20 22:00


 4/11/20 21:59 DC 4/10/20 21:49


58.333 MLS/HR


 


 Sodium Chloride


 90 meq/Potassium


 Phosphate 15 mmol/


 Magnesium Sulfate


 12 meq/Calcium


 Gluconate 15 meq/


 Multivitamins 10


 ml/Chromium/


 Copper/Manganese/


 Seleni/Zn 0.5 ml/


 Insulin Human


 Regular 40 unit/


 Total Parenteral


 Nutrition/Amino


 Acids/Dextrose/


 Fat Emulsion


 Intravenous  1,400 ml @ 


 58.333 mls/


 hr  TPN  CONT  4/11/20 22:00


 4/12/20 21:59 DC 4/11/20 21:21


58.333 MLS/HR


 


 Sodium Chloride


 90 meq/Potassium


 Phosphate 19 mmol/


 Magnesium Sulfate


 12 meq/Calcium


 Gluconate 15 meq/


 Multivitamins 10


 ml/Chromium/


 Copper/Manganese/


 Seleni/Zn 0.5 ml/


 Insulin Human


 Regular 40 unit/


 Total Parenteral


 Nutrition/Amino


 Acids/Dextrose/


 Fat Emulsion


 Intravenous  1,400 ml @ 


 58.333 mls/


 hr  TPN  CONT  4/12/20 22:00


 4/13/20 21:59 DC 4/12/20 21:54


58.333 MLS/HR


 


 Sodium Chloride


 90 meq/Potassium


 Phosphate 5 mmol/


 Magnesium Sulfate


 12 meq/Calcium


 Gluconate 15 meq/


 Multivitamins 10


 ml/Chromium/


 Copper/Manganese/


 Seleni/Zn 0.5 ml/


 Insulin Human


 Regular 30 unit/


 Total Parenteral


 Nutrition/Amino


 Acids/Dextrose/


 Fat Emulsion


 Intravenous  1,400 ml @ 


 58.333 mls/


 hr  TPN  CONT  4/9/20 22:00


 4/10/20 21:59 DC 4/9/20 22:08


58.333 MLS/HR


 


 Sodium Chloride


 100 meq/Potassium


 Chloride 30 meq/


 Potassium Acetate


 30 meq/Magnesium


 Sulfate 12 meq/


 Multivitamins 10


 ml/Chromium/


 Copper/Manganese/


 Seleni/Zn 1 ml/


 Insulin Human


 Regular 15 unit/


 Total Parenteral


 Nutrition/Amino


 Acids/Dextrose/


 Fat Emulsion


 Intravenous  1,680 ml @ 


 70 mls/hr  TPN  CONT  7/5/20 22:00


 7/6/20 21:59 DC 7/5/20 21:23


70 MLS/HR


 


 Sodium Chloride


 100 meq/Potassium


 Chloride 40 meq/


 Magnesium Sulfate


 15 meq/Calcium


 Gluconate 15 meq/


 Multivitamins 10


 ml/Chromium/


 Copper/Manganese/


 Seleni/Zn 0.5 ml/


 Insulin Human


 Regular 35 unit/


 Total Parenteral


 Nutrition/Amino


 Acids/Dextrose/


 Fat Emulsion


 Intravenous  1,400 ml @ 


 58.333 mls/


 hr  TPN  CONT  4/19/20 22:00


 4/20/20 21:59 DC 4/19/20 22:46


58.333 MLS/HR


 


 Sodium Chloride


 100 meq/Potassium


 Chloride 40 meq/


 Magnesium Sulfate


 20 meq/Calcium


 Gluconate 10 meq/


 Multivitamins 10


 ml/Chromium/


 Copper/Manganese/


 Seleni/Zn 0.5 ml/


 Insulin Human


 Regular 35 unit/


 Total Parenteral


 Nutrition/Amino


 Acids/Dextrose/


 Fat Emulsion


 Intravenous  1,400 ml @ 


 58.333 mls/


 hr  TPN  CONT  4/23/20 22:00


 4/24/20 21:59 DC 4/24/20 00:06


58.333 MLS/HR


 


 Sodium Chloride


 100 meq/Potassium


 Chloride 40 meq/


 Magnesium Sulfate


 20 meq/Calcium


 Gluconate 15 meq/


 Multivitamins 10


 ml/Chromium/


 Copper/Manganese/


 Seleni/Zn 0.5 ml/


 Insulin Human


 Regular 35 unit/


 Total Parenteral


 Nutrition/Amino


 Acids/Dextrose/


 Fat Emulsion


 Intravenous  1,400 ml @ 


 58.333 mls/


 hr  TPN  CONT  4/22/20 22:00


 4/23/20 21:59 DC 4/22/20 22:27


58.333 MLS/HR


 


 Sodium Chloride


 100 meq/Potassium


 Phosphate 10 mmol/


 Magnesium Sulfate


 12 meq/Calcium


 Gluconate 15 meq/


 Multivitamins 10


 ml/Chromium/


 Copper/Manganese/


 Seleni/Zn 0.5 ml/


 Insulin Human


 Regular 35 unit/


 Potassium


 Chloride 20 meq/


 Total Parenteral


 Nutrition/Amino


 Acids/Dextrose/


 Fat Emulsion


 Intravenous  1,400 ml @ 


 58.333 mls/


 hr  TPN  CONT  4/16/20 22:00


 4/17/20 21:59 DC 4/16/20 22:10


58.333 MLS/HR


 


 Sodium Chloride


 100 meq/Potassium


 Phosphate 19 mmol/


 Magnesium Sulfate


 12 meq/Calcium


 Gluconate 15 meq/


 Multivitamins 10


 ml/Chromium/


 Copper/Manganese/


 Seleni/Zn 0.5 ml/


 Insulin Human


 Regular 40 unit/


 Potassium


 Chloride 20 meq/


 Total Parenteral


 Nutrition/Amino


 Acids/Dextrose/


 Fat Emulsion


 Intravenous  1,400 ml @ 


 58.333 mls/


 hr  TPN  CONT  4/15/20 22:00


 4/16/20 21:59 DC 4/15/20 21:20


58.333 MLS/HR


 


 Sodium Chloride


 100 meq/Potassium


 Phosphate 5 mmol/


 Magnesium Sulfate


 12 meq/Calcium


 Gluconate 15 meq/


 Multivitamins 10


 ml/Chromium/


 Copper/Manganese/


 Seleni/Zn 0.5 ml/


 Insulin Human


 Regular 35 unit/


 Potassium


 Chloride 20 meq/


 Total Parenteral


 Nutrition/Amino


 Acids/Dextrose/


 Fat Emulsion


 Intravenous  1,400 ml @ 


 58.333 mls/


 hr  TPN  CONT  4/17/20 22:00


 4/18/20 21:59 DC 4/17/20 22:59


58.333 MLS/HR


 


 Sodium Chloride


 110 meq/Potassium


 Chloride 30 meq/


 Potassium Acetate


 30 meq/Magnesium


 Sulfate 15 meq/


 Multivitamins 10


 ml/Chromium/


 Copper/Manganese/


 Seleni/Zn 1 ml/


 Insulin Human


 Regular 15 unit/


 Total Parenteral


 Nutrition/Amino


 Acids/Dextrose/


 Fat Emulsion


 Intravenous  1,680 ml @ 


 70 mls/hr  TPN  CONT  7/18/20 22:00


 7/19/20 21:59 DC 7/18/20 22:01


70 MLS/HR


 


 Sodium Chloride


 110 meq/Sodium


 Phosphate 10 mmol/


 Potassium


 Chloride 30 meq/


 Potassium Acetate


 30 meq/Magnesium


 Sulfate 15 meq/


 Multivitamins 10


 ml/Chromium/


 Copper/Manganese/


 Seleni/Zn 1 ml/


 Insulin Human


 Regular 15 unit/


 Total Parenteral


 Nutrition/Amino


 Acids/Dextrose/


 Fat Emulsion


 Intravenous  1,680 ml @ 


 70 mls/hr  TPN  CONT  7/19/20 22:00


 7/20/20 21:59 DC 7/19/20 22:03


70 MLS/HR


 


 Sodium Chloride


 120 meq/Sodium


 Phosphate 10 mmol/


 Potassium


 Chloride 30 meq/


 Potassium Acetate


 30 meq/Magnesium


 Sulfate 15 meq/


 Multivitamins 10


 ml/Chromium/


 Copper/Manganese/


 Seleni/Zn 1 ml/


 Insulin Human


 Regular 15 unit/


 Total Parenteral


 Nutrition/Amino


 Acids/Dextrose/


 Fat Emulsion


 Intravenous  1,680 ml @ 


 70 mls/hr  TPN  CONT  7/26/20 22:00


 7/27/20 21:59 Cancel  





 


 Succinylcholine


 Chloride


  (Anectine)  120 mg  1X  ONCE  3/23/20 08:30


 3/23/20 08:31 DC 3/23/20 08:34


120 MG


 


 Vancomycin HCl


  (Vanco Per


 Pharmacy)  1 each  PRN DAILY  PRN  7/12/20 09:15


 7/15/20 07:41 DC 7/14/20 02:46


1 EACH


 


 Vancomycin HCl


  (Vancomycin


 Random Level)  1 each  1X  ONCE  7/14/20 01:00


 7/14/20 01:01 DC 7/14/20 01:00


1 EACH


 


 Vancomycin HCl


  (Vancomycin


 Trough Level)  1 each  1X  ONCE  7/15/20 09:30


 7/15/20 09:31 Cancel  





 


 Vancomycin HCl


 1.5 gm/Sodium


 Chloride  500 ml @ 


 250 mls/hr  Q12H  7/14/20 10:00


 7/15/20 07:41 DC 7/14/20 22:07


250 MLS/HR


 


 Vancomycin HCl 2


 gm/Sodium Chloride  500 ml @ 


 250 mls/hr  1X  ONCE  7/12/20 10:00


 7/12/20 11:59 DC 7/12/20 10:34


250 MLS/HR


 


 Vasopressin


  (Vasostrict)  20 unit  STK-MED ONCE  6/30/20 12:23


 6/30/20 12:23 DC  





 


 Vasopressin 20


 unit/Dextrose  101 ml @ 


 12 mls/hr  CONT  PRN  6/30/20 15:30


 8/3/20 09:45 DC 7/7/20 04:17


12 MLS/HR


 


 Vecuronium Bromide


  (Norcuron Bolus)  6 mg  PRN Q6HRS  PRN  5/7/20 19:15


 5/7/20 19:35 DC  





 


 Vitamin A/Vitamin


 D


  (Vitamin A & D


 Ointment)  1 thomas  PRN Q1HR  PRN  8/4/20 09:15


    8/8/20 05:26


1 THOMAS


 


 Zoledronic Acid  100 ml @ 


 400 mls/hr  1X  ONCE  8/7/20 12:00


 8/7/20 12:14 DC 8/7/20 13:04


400 MLS/HR











Labs:


Lab





Laboratory Tests








Test


 8/9/20


12:27 8/9/20


17:17 8/9/20


17:51 8/9/20


19:50


 


Glucose (Fingerstick)


 163 mg/dL


(70-99) 164 mg/dL


(70-99) 


 





 


Phosphorus Level


 


 


 2.7 mg/dL


(2.6-4.7) 





 


Lactic Acid Level


 


 


 


 < 0.3 mmol/L


(0.4-2.0)


 


Test


 8/9/20


23:01 8/10/20


03:00 


 





 


Glucose (Fingerstick)


 166 mg/dL


(70-99) 


 


 





 


White Blood Count


 


 23.1 x10^3/uL


(4.0-11.0) 


 





 


Red Blood Count


 


 3.29 x10^6/uL


(3.50-5.40) 


 





 


Hemoglobin


 


 9.3 g/dL


(12.0-15.5) 


 





 


Hematocrit


 


 29.2 %


(36.0-47.0) 


 





 


Mean Corpuscular Volume  89 fL ()   


 


Mean Corpuscular Hemoglobin  28 pg (25-35)   


 


Mean Corpuscular Hemoglobin


Concent 


 32 g/dL


(31-37) 


 





 


Red Cell Distribution Width


 


 18.9 %


(11.5-14.5) 


 





 


Platelet Count


 


 620 x10^3/uL


(140-400) 


 





 


Neutrophils (%) (Auto)  82 % (31-73)   


 


Lymphocytes (%) (Auto)  12 % (24-48)   


 


Monocytes (%) (Auto)  6 % (0-9)   


 


Eosinophils (%) (Auto)  0 % (0-3)   


 


Basophils (%) (Auto)  0 % (0-3)   


 


Neutrophils # (Auto)


 


 18.9 x10^3/uL


(1.8-7.7) 


 





 


Lymphocytes # (Auto)


 


 2.9 x10^3/uL


(1.0-4.8) 


 





 


Monocytes # (Auto)


 


 1.3 x10^3/uL


(0.0-1.1) 


 





 


Eosinophils # (Auto)


 


 0.0 x10^3/uL


(0.0-0.7) 


 





 


Basophils # (Auto)


 


 0.1 x10^3/uL


(0.0-0.2) 


 





 


Sodium Level


 


 145 mmol/L


(136-145) 


 





 


Potassium Level


 


 5.6 mmol/L


(3.5-5.1) 


 





 


Chloride Level


 


 111 mmol/L


() 


 





 


Carbon Dioxide Level


 


 26 mmol/L


(21-32) 


 





 


Anion Gap  8 (6-14)   


 


Blood Urea Nitrogen


 


 35 mg/dL


(7-20) 


 





 


Creatinine


 


 1.2 mg/dL


(0.6-1.0) 


 





 


Estimated GFR


(Cockcroft-Gault) 


 47.7 


 


 





 


Glucose Level


 


 138 mg/dL


(70-99) 


 





 


Calcium Level


 


 9.1 mg/dL


(8.5-10.1) 


 











Micro


        NEG ANSON 56


        PSEUDOMONAS AERUGINOSA


        ANTIBIOTIC                        RESULT          INTERPRETATION


        AMIKACIN                          <=16                  S


        AZTREONAM                         >16                   R


        CEFTAZIDIME                       >16                   R


        CIPROFLOXACIN                     <=0.25                S


        CEFEPIME                          16                    I


        GENTAMICIN                        <=2                   S


        LEVOFLOXACIN                      <=0.5                 S














                               ** CONTINUED ON NEXT PAGE **





 

--------------------------------------------------------------------------------


------------





RUN DATE: 06/10/20                  [facility] *[facility]*               

  PAGE 2   


RUN TIME: 1121                            Specimen Inquiry                    


 

--------------------------------------------------------------------------------


------------





SPEC: [id number]    PATIENT: JESENIA BEAN                [id number]  

(Continued)


-------------------------------------

-------------------------------------------------------








----------------------------------------------------------------------

----------------------





  Procedure                         Result                                      

         


-------------

-------------------------------------------------------------------------------





  ANTIMICROBIAL SUSCEPTIBILITY  Preliminary   (continued)


        MEROPENEM                         <=1                   S


        PIPERACILLIN/TAZOBACTAM           64                    S


        TOBRAMYCIN                        <=2                   S


        Unless otherwise specified, Testing Performed by:


        [address] [address]


        For Inquires, the Physician may contact the Microbiology


        department at [phone]





--------------------------------------------------

------------------------------------------





Objective:


Assessment:


Patient with prolonged hospitalization more than 4 months


Multiple medical problems


Multiple surgical procedures





  





S/P Exp. Eleonora, naif MCCLURE G-J tube & pancreatic necrosectomy on 6/30, C. 

parapsilosis & PSAE (I-merrem/ceftazidime/AZT/cefepime))


Leukocytosis - better


Loose stool but on tube feed - WBC down and no gross fever


Fever - 7/25 c-diff neg


Anemia


Acute gallstone pancreatitis with persistent necrosis


  - 4/9.  CT A/P Increased ascites. Persistent evidence of necrotizing 

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


  - 4/27. status post ROBERT drain placement; C. parapsilosis. s/p drain 5/6 + yeast

& high amylase; s/p additional drain on 5/8. Drains removed. 


  -5/6. fluid  candida parapsilosis fluid, amylase high


  - 6/6 showed multiple pseudocysts, slight larger on the right. s/p drains x 3,

6/7.  + PSAE (MDRO-R Cefepime, Zosyn ANSON < 64) and yeast, 


  -6/7 s/p drain replacement x 3; fluid cult PSAE (MDRO), yeast; treated


  -7/12 CT A/P shows smaller fluid collections.  


  -722 CT abdomen and pelvis drains in place       


Ascites s/p paracentesis 4/15 & 5/6. C. parapsilosis 


Cholelithiasis with thickening of the gallbladder wall.


JED, Hyperkalemia, Metabolic acidosis off dialysis


Acute hypoxic resp failure. trach/vent. sputum 6/13  + PSAE (I merrem) ; sputum 

culture July 19+ for PSAE R Merrem, sensitive to cefepime


Pleural effusion status post CTS left side


 Abdominal fluid culture 6 /7 MDRO Pseudomonas, yeast


Sputum culture positive 7/19 for MDRO Pseudomonas


Chest tube fluid positive for 7/21 Candida Parapsilosis


EC fistula


Severe PCM


Critical illness myopathy


Gen debility





Plan:


Plan of Care





Start cefepime and flagyl


BC 


UA and Urine c/s


[place] changed 7/27


Nystatin to groin


UA and urine culture Blood culture/Cath tip neg - neg


C. difficile negative


Monitor WBC/temp 


Wound care /drain management as directed


Contact isolation for CRE/MDRO


D/W RN





Long term prognosis  poor





D/w nursing











IVAN FRANZ MD           Aug 10, 2020 08:57

## 2020-08-10 NOTE — PDOC
Date of Service:


DATE: 8/10/20 


TIME: 10:17





Objective:


Objective:


D/w nursing - fevers.


Vital Signs:





                                   Vital Signs








  Date Time  Temp Pulse Resp B/P (MAP) Pulse Ox O2 Delivery O2 Flow Rate FiO2


 


8/10/20 08:42     95 Room Air  


 


8/10/20 07:00 99.2 136 20 107/64 (78)    





 99.2       


 


8/10/20 05:00       2.0 








Labs:





Laboratory Tests








Test


 8/9/20


12:27 8/9/20


17:17 8/9/20


17:51 8/9/20


19:50


 


Glucose (Fingerstick) 163 mg/dL  164 mg/dL   


 


Phosphorus Level   2.7 mg/dL  


 


Lactic Acid Level    < 0.3 mmol/L 


 


Test


 8/9/20


23:01 8/10/20


03:00 


 





 


Glucose (Fingerstick) 166 mg/dL    


 


White Blood Count  23.1 x10^3/uL   


 


Red Blood Count  3.29 x10^6/uL   


 


Hemoglobin  9.3 g/dL   


 


Hematocrit  29.2 %   


 


Mean Corpuscular Volume  89 fL   


 


Mean Corpuscular Hemoglobin  28 pg   


 


Mean Corpuscular Hemoglobin


Concent 


 32 g/dL 


 


 





 


Red Cell Distribution Width  18.9 %   


 


Platelet Count  620 x10^3/uL   


 


Neutrophils (%) (Auto)  82 %   


 


Lymphocytes (%) (Auto)  12 %   


 


Monocytes (%) (Auto)  6 %   


 


Eosinophils (%) (Auto)  0 %   


 


Basophils (%) (Auto)  0 %   


 


Neutrophils # (Auto)  18.9 x10^3/uL   


 


Lymphocytes # (Auto)  2.9 x10^3/uL   


 


Monocytes # (Auto)  1.3 x10^3/uL   


 


Eosinophils # (Auto)  0.0 x10^3/uL   


 


Basophils # (Auto)  0.1 x10^3/uL   


 


Sodium Level  145 mmol/L   


 


Potassium Level  5.6 mmol/L   


 


Chloride Level  111 mmol/L   


 


Carbon Dioxide Level  26 mmol/L   


 


Anion Gap  8   


 


Blood Urea Nitrogen  35 mg/dL   


 


Creatinine  1.2 mg/dL   


 


Estimated GFR


(Cockcroft-Gault) 


 47.7 


 


 





 


Glucose Level  138 mg/dL   


 


Calcium Level  9.1 mg/dL   





ORDERED:  FUNGAL CULT,BLD                                                       

 


 

--------------------------------------------------------------------------------


------------





  Procedure                         Result                                      

         


--------------------------------------

------------------------------------------------------





  FUNGAL CULTURE,BLOOD  Final  


        Final report





  JESSICA CULT RES 1  Final  


        Comment





        No yeast or mold isolated after 4 weeks.


Imaging:


CXR 8/9


Impression: 


1.  Decreased small bilateral layering pleural effusions interstitial 

thickening.


2.  Low lung volumes.





PE:





GEN: chronically ill


NEURO/PSYCH: sleeping, not awakened





A/P:


S/p pancreatic necrosectomy, fevers





--


Continue same per GI.





Justicifation of Admission Dx:


Justifications for Admission:


Justification of Admission Dx:  Yes











RAGHAVENDRA MURCIA         Aug 10, 2020 10:21

## 2020-08-10 NOTE — NUR
SS following up with discharge planning. SS reviewed pt chart and discussed with pt RN. Pt 
is from home and is currently on room air. Pt started IV Cefepime and IV Flagyl. Pt 
continues to have ostomy equipment over drainage sites. Pt has [place]. Per pulmonology pt may 
have trach removed soon. Pt Max Assist with PT/OT and staff. SS contacted [employer] to 
follow up with disability application. Med Assist reported that pt's daughters have not 
provided needed information to this date. APS hotline report was made last week due to lack 
of compliance from pt's daughters. SS will continue to follow for discharge planning.

## 2020-08-10 NOTE — PDOC
SURGICAL PROGRESS NOTE


DATE: 8/10/20 


TIME: 12:35


Subjective


up in chair


working with therapy


Vital Signs





Vital Signs








  Date Time  Temp Pulse Resp B/P (MAP) Pulse Ox O2 Delivery O2 Flow Rate FiO2


 


8/10/20 11:00 99.9 144 24 116/54 (74) 95 Room Air  





 99.9       


 


8/10/20 05:00       2.0 








I&O











Intake and Output 


 


 8/10/20





 07:00


 


Intake Total 1042 ml


 


Output Total 2745 ml


 


Balance -1703 ml


 


 


 


Intake Oral 0 ml


 


Tube Feeding 942 ml


 


Other 100 ml


 


Output Urine Total 175 ml


 


Gastric Drainage Total 0 ml


 


Drainage Total 2570 ml


 


# Bowel Movements 5








General:  Cooperative


Abdomen:  Soft


Labs





Laboratory Tests








Test


 8/8/20


18:29 8/9/20


02:33 8/9/20


05:20 8/9/20


07:44


 


Glucose (Fingerstick)


 143 mg/dL


(70-99) 119 mg/dL


(70-99) 


 133 mg/dL


(70-99)


 


Sodium Level


 


 


 143 mmol/L


(136-145) 





 


Potassium Level


 


 


 4.7 mmol/L


(3.5-5.1) 





 


Chloride Level


 


 


 109 mmol/L


() 





 


Carbon Dioxide Level


 


 


 28 mmol/L


(21-32) 





 


Anion Gap   6 (6-14)  


 


Blood Urea Nitrogen


 


 


 21 mg/dL


(7-20) 





 


Creatinine


 


 


 0.8 mg/dL


(0.6-1.0) 





 


Estimated GFR


(Cockcroft-Gault) 


 


 76.2 


 





 


Glucose Level


 


 


 160 mg/dL


(70-99) 





 


Calcium Level


 


 


 10.1 mg/dL


(8.5-10.1) 





 


Magnesium Level


 


 


 1.9 mg/dL


(1.8-2.4) 





 


Vitamin B12 Level


 


 


 1169 pg/mL


(247-911) 





 


25-Hydroxy Vitamin D Total


 


 


 15.9 ng/mL


() 





 


Test


 8/9/20


12:27 8/9/20


17:17 8/9/20


17:51 8/9/20


19:50


 


Glucose (Fingerstick)


 163 mg/dL


(70-99) 164 mg/dL


(70-99) 


 





 


Phosphorus Level


 


 


 2.7 mg/dL


(2.6-4.7) 





 


Lactic Acid Level


 


 


 


 < 0.3 mmol/L


(0.4-2.0)


 


Test


 8/9/20


23:01 8/10/20


03:00 8/10/20


09:45 8/10/20


12:27


 


Glucose (Fingerstick)


 166 mg/dL


(70-99) 


 


 171 mg/dL


(70-99)


 


White Blood Count


 


 23.1 x10^3/uL


(4.0-11.0) 


 





 


Red Blood Count


 


 3.29 x10^6/uL


(3.50-5.40) 


 





 


Hemoglobin


 


 9.3 g/dL


(12.0-15.5) 


 





 


Hematocrit


 


 29.2 %


(36.0-47.0) 


 





 


Mean Corpuscular Volume  89 fL ()   


 


Mean Corpuscular Hemoglobin  28 pg (25-35)   


 


Mean Corpuscular Hemoglobin


Concent 


 32 g/dL


(31-37) 


 





 


Red Cell Distribution Width


 


 18.9 %


(11.5-14.5) 


 





 


Platelet Count


 


 620 x10^3/uL


(140-400) 


 





 


Neutrophils (%) (Auto)  82 % (31-73)   


 


Lymphocytes (%) (Auto)  12 % (24-48)   


 


Monocytes (%) (Auto)  6 % (0-9)   


 


Eosinophils (%) (Auto)  0 % (0-3)   


 


Basophils (%) (Auto)  0 % (0-3)   


 


Neutrophils # (Auto)


 


 18.9 x10^3/uL


(1.8-7.7) 


 





 


Lymphocytes # (Auto)


 


 2.9 x10^3/uL


(1.0-4.8) 


 





 


Monocytes # (Auto)


 


 1.3 x10^3/uL


(0.0-1.1) 


 





 


Eosinophils # (Auto)


 


 0.0 x10^3/uL


(0.0-0.7) 


 





 


Basophils # (Auto)


 


 0.1 x10^3/uL


(0.0-0.2) 


 





 


Sodium Level


 


 145 mmol/L


(136-145) 


 





 


Potassium Level


 


 5.6 mmol/L


(3.5-5.1) 


 





 


Chloride Level


 


 111 mmol/L


() 


 





 


Carbon Dioxide Level


 


 26 mmol/L


(21-32) 


 





 


Anion Gap  8 (6-14)   


 


Blood Urea Nitrogen


 


 35 mg/dL


(7-20) 


 





 


Creatinine


 


 1.2 mg/dL


(0.6-1.0) 


 





 


Estimated GFR


(Cockcroft-Gault) 


 47.7 


 


 





 


Glucose Level


 


 138 mg/dL


(70-99) 


 





 


Calcium Level


 


 9.1 mg/dL


(8.5-10.1) 


 





 


Urine Collection Type   Unknown  


 


Urine Color   Jolene  


 


Urine Clarity   Turbid  


 


Urine pH   5.0 (<5.0-8.0)  


 


Urine Specific Gravity


 


 


 >=1.030


(1.000-1.030) 





 


Urine Protein


 


 


 100 mg/dL


(NEG-TRACE) 





 


Urine Glucose (UA)


 


 


 Negative mg/dL


(NEG) 





 


Urine Ketones (Stick)


 


 


 Trace mg/dL


(NEG) 





 


Urine Blood   Large (NEG)  


 


Urine Nitrite   Negative (NEG)  


 


Urine Bilirubin   Negative (NEG)  


 


Urine Urobilinogen Dipstick


 


 


 0.2 mg/dL (0.2


mg/dL) 





 


Urine Leukocyte Esterase   Large (NEG)  


 


Urine RBC


 


 


 Field obscured


/HPF (0-2) 





 


Urine WBC


 


 


 Tntc /HPF


(0-4) 





 


Urine Bacteria


 


 


 Many /HPF


(0-FEW) 





 


Urine Yeast   Present /HPF  








Laboratory Tests








Test


 8/9/20


17:17 8/9/20


17:51 8/9/20


19:50 8/9/20


23:01


 


Glucose (Fingerstick)


 164 mg/dL


(70-99) 


 


 166 mg/dL


(70-99)


 


Phosphorus Level


 


 2.7 mg/dL


(2.6-4.7) 


 





 


Lactic Acid Level


 


 


 < 0.3 mmol/L


(0.4-2.0) 





 


Test


 8/10/20


03:00 8/10/20


09:45 8/10/20


12:27 





 


White Blood Count


 23.1 x10^3/uL


(4.0-11.0) 


 


 





 


Red Blood Count


 3.29 x10^6/uL


(3.50-5.40) 


 


 





 


Hemoglobin


 9.3 g/dL


(12.0-15.5) 


 


 





 


Hematocrit


 29.2 %


(36.0-47.0) 


 


 





 


Mean Corpuscular Volume 89 fL ()    


 


Mean Corpuscular Hemoglobin 28 pg (25-35)    


 


Mean Corpuscular Hemoglobin


Concent 32 g/dL


(31-37) 


 


 





 


Red Cell Distribution Width


 18.9 %


(11.5-14.5) 


 


 





 


Platelet Count


 620 x10^3/uL


(140-400) 


 


 





 


Neutrophils (%) (Auto) 82 % (31-73)    


 


Lymphocytes (%) (Auto) 12 % (24-48)    


 


Monocytes (%) (Auto) 6 % (0-9)    


 


Eosinophils (%) (Auto) 0 % (0-3)    


 


Basophils (%) (Auto) 0 % (0-3)    


 


Neutrophils # (Auto)


 18.9 x10^3/uL


(1.8-7.7) 


 


 





 


Lymphocytes # (Auto)


 2.9 x10^3/uL


(1.0-4.8) 


 


 





 


Monocytes # (Auto)


 1.3 x10^3/uL


(0.0-1.1) 


 


 





 


Eosinophils # (Auto)


 0.0 x10^3/uL


(0.0-0.7) 


 


 





 


Basophils # (Auto)


 0.1 x10^3/uL


(0.0-0.2) 


 


 





 


Sodium Level


 145 mmol/L


(136-145) 


 


 





 


Potassium Level


 5.6 mmol/L


(3.5-5.1) 


 


 





 


Chloride Level


 111 mmol/L


() 


 


 





 


Carbon Dioxide Level


 26 mmol/L


(21-32) 


 


 





 


Anion Gap 8 (6-14)    


 


Blood Urea Nitrogen


 35 mg/dL


(7-20) 


 


 





 


Creatinine


 1.2 mg/dL


(0.6-1.0) 


 


 





 


Estimated GFR


(Cockcroft-Gault) 47.7 


 


 


 





 


Glucose Level


 138 mg/dL


(70-99) 


 


 





 


Calcium Level


 9.1 mg/dL


(8.5-10.1) 


 


 





 


Urine Collection Type  Unknown   


 


Urine Color  Jolene   


 


Urine Clarity  Turbid   


 


Urine pH  5.0 (<5.0-8.0)   


 


Urine Specific Gravity


 


 >=1.030


(1.000-1.030) 


 





 


Urine Protein


 


 100 mg/dL


(NEG-TRACE) 


 





 


Urine Glucose (UA)


 


 Negative mg/dL


(NEG) 


 





 


Urine Ketones (Stick)


 


 Trace mg/dL


(NEG) 


 





 


Urine Blood  Large (NEG)   


 


Urine Nitrite  Negative (NEG)   


 


Urine Bilirubin  Negative (NEG)   


 


Urine Urobilinogen Dipstick


 


 0.2 mg/dL (0.2


mg/dL) 


 





 


Urine Leukocyte Esterase  Large (NEG)   


 


Urine RBC


 


 Field obscured


/HPF (0-2) 


 





 


Urine WBC


 


 Tntc /HPF


(0-4) 


 





 


Urine Bacteria


 


 Many /HPF


(0-FEW) 


 





 


Urine Yeast  Present /HPF   


 


Glucose (Fingerstick)


 


 


 171 mg/dL


(70-99) 











Problem List


Problems


Medical Problems:


(1) Acute pancreatitis


Status: Acute  





(2) Cholelithiasis


Status: Acute  








Assessment/Plan


supportive care


no additional surgical plans





Justicifation of Admission Dx:


Justifications for Admission:


Justification of Admission Dx:  Yes











SAURAV NASH APRN            Aug 10, 2020 12:36

## 2020-08-10 NOTE — PDOC
PULMONARY PROGRESS NOTES


DATE: 8/10/20 


TIME: 12:07


Subjective


Resting comfortable on trach W/ room air 


no complaints, no overnight concerns from nursing


Vitals





Vital Signs








  Date Time  Temp Pulse Resp B/P (MAP) Pulse Ox O2 Delivery O2 Flow Rate FiO2


 


8/10/20 11:00 99.9 144 24 116/54 (74) 95 Room Air  





 99.9       


 


8/10/20 05:00       2.0 








ROS:  No Nausea, No Chest Pain, No Abdominal Pain, No Increase Cough


General:  Alert


HEENT:  Other (trach site CDI)


Lungs:  Clear


Cardiovascular:  S1, S2


Abdomen:  Soft, Non-tender, Other (multiple ROBERT drains )


Neuro Exam:  Alert


Extremities:  Other (+1 BLE edema)


Skin:  Warm


Labs





Laboratory Tests








Test


 8/8/20


12:23 8/8/20


18:29 8/9/20


02:33 8/9/20


05:20


 


Glucose (Fingerstick)


 129 mg/dL


(70-99) 143 mg/dL


(70-99) 119 mg/dL


(70-99) 





 


Sodium Level


 


 


 


 143 mmol/L


(136-145)


 


Potassium Level


 


 


 


 4.7 mmol/L


(3.5-5.1)


 


Chloride Level


 


 


 


 109 mmol/L


()


 


Carbon Dioxide Level


 


 


 


 28 mmol/L


(21-32)


 


Anion Gap    6 (6-14) 


 


Blood Urea Nitrogen


 


 


 


 21 mg/dL


(7-20)


 


Creatinine


 


 


 


 0.8 mg/dL


(0.6-1.0)


 


Estimated GFR


(Cockcroft-Gault) 


 


 


 76.2 





 


Glucose Level


 


 


 


 160 mg/dL


(70-99)


 


Calcium Level


 


 


 


 10.1 mg/dL


(8.5-10.1)


 


Magnesium Level


 


 


 


 1.9 mg/dL


(1.8-2.4)


 


Vitamin B12 Level


 


 


 


 1169 pg/mL


(247-911)


 


25-Hydroxy Vitamin D Total


 


 


 


 15.9 ng/mL


()


 


Test


 8/9/20


07:44 8/9/20


12:27 8/9/20


17:17 8/9/20


17:51


 


Glucose (Fingerstick)


 133 mg/dL


(70-99) 163 mg/dL


(70-99) 164 mg/dL


(70-99) 





 


Phosphorus Level


 


 


 


 2.7 mg/dL


(2.6-4.7)


 


Test


 8/9/20


19:50 8/9/20


23:01 8/10/20


03:00 8/10/20


09:45


 


Lactic Acid Level


 < 0.3 mmol/L


(0.4-2.0) 


 


 





 


Glucose (Fingerstick)


 


 166 mg/dL


(70-99) 


 





 


White Blood Count


 


 


 23.1 x10^3/uL


(4.0-11.0) 





 


Red Blood Count


 


 


 3.29 x10^6/uL


(3.50-5.40) 





 


Hemoglobin


 


 


 9.3 g/dL


(12.0-15.5) 





 


Hematocrit


 


 


 29.2 %


(36.0-47.0) 





 


Mean Corpuscular Volume   89 fL ()  


 


Mean Corpuscular Hemoglobin   28 pg (25-35)  


 


Mean Corpuscular Hemoglobin


Concent 


 


 32 g/dL


(31-37) 





 


Red Cell Distribution Width


 


 


 18.9 %


(11.5-14.5) 





 


Platelet Count


 


 


 620 x10^3/uL


(140-400) 





 


Neutrophils (%) (Auto)   82 % (31-73)  


 


Lymphocytes (%) (Auto)   12 % (24-48)  


 


Monocytes (%) (Auto)   6 % (0-9)  


 


Eosinophils (%) (Auto)   0 % (0-3)  


 


Basophils (%) (Auto)   0 % (0-3)  


 


Neutrophils # (Auto)


 


 


 18.9 x10^3/uL


(1.8-7.7) 





 


Lymphocytes # (Auto)


 


 


 2.9 x10^3/uL


(1.0-4.8) 





 


Monocytes # (Auto)


 


 


 1.3 x10^3/uL


(0.0-1.1) 





 


Eosinophils # (Auto)


 


 


 0.0 x10^3/uL


(0.0-0.7) 





 


Basophils # (Auto)


 


 


 0.1 x10^3/uL


(0.0-0.2) 





 


Sodium Level


 


 


 145 mmol/L


(136-145) 





 


Potassium Level


 


 


 5.6 mmol/L


(3.5-5.1) 





 


Chloride Level


 


 


 111 mmol/L


() 





 


Carbon Dioxide Level


 


 


 26 mmol/L


(21-32) 





 


Anion Gap   8 (6-14)  


 


Blood Urea Nitrogen


 


 


 35 mg/dL


(7-20) 





 


Creatinine


 


 


 1.2 mg/dL


(0.6-1.0) 





 


Estimated GFR


(Cockcroft-Gault) 


 


 47.7 


 





 


Glucose Level


 


 


 138 mg/dL


(70-99) 





 


Calcium Level


 


 


 9.1 mg/dL


(8.5-10.1) 





 


Urine Collection Type    Unknown 


 


Urine Color    Jolene 


 


Urine Clarity    Turbid 


 


Urine pH    5.0 (<5.0-8.0) 


 


Urine Specific Gravity


 


 


 


 >=1.030


(1.000-1.030)


 


Urine Protein


 


 


 


 100 mg/dL


(NEG-TRACE)


 


Urine Glucose (UA)


 


 


 


 Negative mg/dL


(NEG)


 


Urine Ketones (Stick)


 


 


 


 Trace mg/dL


(NEG)


 


Urine Blood    Large (NEG) 


 


Urine Nitrite    Negative (NEG) 


 


Urine Bilirubin    Negative (NEG) 


 


Urine Urobilinogen Dipstick


 


 


 


 0.2 mg/dL (0.2


mg/dL)


 


Urine Leukocyte Esterase    Large (NEG) 


 


Urine RBC


 


 


 


 Field obscured


/HPF (0-2)


 


Urine WBC


 


 


 


 Tntc /HPF


(0-4)


 


Urine Bacteria


 


 


 


 Many /HPF


(0-FEW)


 


Urine Yeast    Present /HPF 








Laboratory Tests








Test


 8/9/20


12:27 8/9/20


17:17 8/9/20


17:51 8/9/20


19:50


 


Glucose (Fingerstick)


 163 mg/dL


(70-99) 164 mg/dL


(70-99) 


 





 


Phosphorus Level


 


 


 2.7 mg/dL


(2.6-4.7) 





 


Lactic Acid Level


 


 


 


 < 0.3 mmol/L


(0.4-2.0)


 


Test


 8/9/20


23:01 8/10/20


03:00 8/10/20


09:45 





 


Glucose (Fingerstick)


 166 mg/dL


(70-99) 


 


 





 


White Blood Count


 


 23.1 x10^3/uL


(4.0-11.0) 


 





 


Red Blood Count


 


 3.29 x10^6/uL


(3.50-5.40) 


 





 


Hemoglobin


 


 9.3 g/dL


(12.0-15.5) 


 





 


Hematocrit


 


 29.2 %


(36.0-47.0) 


 





 


Mean Corpuscular Volume  89 fL ()   


 


Mean Corpuscular Hemoglobin  28 pg (25-35)   


 


Mean Corpuscular Hemoglobin


Concent 


 32 g/dL


(31-37) 


 





 


Red Cell Distribution Width


 


 18.9 %


(11.5-14.5) 


 





 


Platelet Count


 


 620 x10^3/uL


(140-400) 


 





 


Neutrophils (%) (Auto)  82 % (31-73)   


 


Lymphocytes (%) (Auto)  12 % (24-48)   


 


Monocytes (%) (Auto)  6 % (0-9)   


 


Eosinophils (%) (Auto)  0 % (0-3)   


 


Basophils (%) (Auto)  0 % (0-3)   


 


Neutrophils # (Auto)


 


 18.9 x10^3/uL


(1.8-7.7) 


 





 


Lymphocytes # (Auto)


 


 2.9 x10^3/uL


(1.0-4.8) 


 





 


Monocytes # (Auto)


 


 1.3 x10^3/uL


(0.0-1.1) 


 





 


Eosinophils # (Auto)


 


 0.0 x10^3/uL


(0.0-0.7) 


 





 


Basophils # (Auto)


 


 0.1 x10^3/uL


(0.0-0.2) 


 





 


Sodium Level


 


 145 mmol/L


(136-145) 


 





 


Potassium Level


 


 5.6 mmol/L


(3.5-5.1) 


 





 


Chloride Level


 


 111 mmol/L


() 


 





 


Carbon Dioxide Level


 


 26 mmol/L


(21-32) 


 





 


Anion Gap  8 (6-14)   


 


Blood Urea Nitrogen


 


 35 mg/dL


(7-20) 


 





 


Creatinine


 


 1.2 mg/dL


(0.6-1.0) 


 





 


Estimated GFR


(Cockcroft-Gault) 


 47.7 


 


 





 


Glucose Level


 


 138 mg/dL


(70-99) 


 





 


Calcium Level


 


 9.1 mg/dL


(8.5-10.1) 


 





 


Urine Collection Type   Unknown  


 


Urine Color   Jolene  


 


Urine Clarity   Turbid  


 


Urine pH   5.0 (<5.0-8.0)  


 


Urine Specific Gravity


 


 


 >=1.030


(1.000-1.030) 





 


Urine Protein


 


 


 100 mg/dL


(NEG-TRACE) 





 


Urine Glucose (UA)


 


 


 Negative mg/dL


(NEG) 





 


Urine Ketones (Stick)


 


 


 Trace mg/dL


(NEG) 





 


Urine Blood   Large (NEG)  


 


Urine Nitrite   Negative (NEG)  


 


Urine Bilirubin   Negative (NEG)  


 


Urine Urobilinogen Dipstick


 


 


 0.2 mg/dL (0.2


mg/dL) 





 


Urine Leukocyte Esterase   Large (NEG)  


 


Urine RBC


 


 


 Field obscured


/HPF (0-2) 





 


Urine WBC


 


 


 Tntc /HPF


(0-4) 





 


Urine Bacteria


 


 


 Many /HPF


(0-FEW) 





 


Urine Yeast   Present /HPF  








Medications





Active Scripts








 Medications  Dose


 Route/Sig


 Max Daily Dose Days Date Category


 


 Bisoprolol


 Fumarate 5 Mg


 Tablet  10 Mg


 PO DAILY


   3/16/20 Reported








Comments








ct reviewed 7/12/20, Decreased left-sided effusion after catheter placement. The




right-sided effusion has increased as has atelectasis.


 





 


There has been exchange or placement of multiple drainage 


tubes and a gastrojejunostomy tube. Both collections are smaller. No 


significant new abdominal fluid collection is seen. The jejunal component 


of the gastrojejunostomy tube appears to be looped in the proximal small 


bowel. 











ct abdomen /pelvis 6/6


1. Removal of the percutaneous pigtail drainage catheters since the prior 


exam. Sequela of pancreatitis with extensive pseudocysts again 


demonstrated, the right-sided collections are slightly larger since the 


prior exam, the left-sided collections are stable. See above.


2. Moderate to large left pleural effusion with atelectasis and collapse 


of most of the left lower lobe, stable. Small right pleural effusion is 


stable.


3. Gallstone.





ct chest 6/15 reviewed








 GRAM NEG COCCOBACILLI:MANY


        SQUAMOUS EPI CELL:RARE


        PMN (WBCs):FEW


        Unless otherwise specified, Testing Performed by:


        [address] [address]


        For Inquires, the Physician may contact the Microbiology


        department at [phone]





  RESPIRATORY CULTURE  Final  


        Final





        MANY GRAM NEGATIVE RODS on 06/15/20 at 1107


        FINAL ID= [PSEUDOMONAS AERUGINOSA]


        MICRO CHARGES


        PSEUDOMONAS AERUGINOSA





  ANTIMICROBIAL SUSCEPTIBILITY  Final  


        Comment





        NEG ANSON 56


        PSEUDOMONAS AERUGINOSA


        ANTIBIOTIC                        RESULT          INTERPRETATION


        AMIKACIN                          <=16                  S


        AZTREONAM                         <=4                   S


        CEFTAZIDIME                       <=1                   S


        CIPROFLOXACIN                     <=0.25                S


        CEFEPIME                          <=2                   S


        CEFTAZIDIME/AVIBACTAM             <=4                   S


        GENTAMICIN                        <=2                   S


        LEVOFLOXACIN                      <=0.5                 S





Impression


.


IMPRESSION:


1.  Acute hypoxemic respiratory failure secondary to ARDS status post trach, 

developed anemia 6/7, blood drainage from RLQ abdomen drain site, and thurston

rrounding firmness  / developed septic shock 6/7 from abdomen source, required 

levo 6/7-- now on room air with trach 


s/p 3 new drains 6/7 with brown color drainage, --- off pressors


S/P Exp. 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 

parapsilosis & PSAE (I-merrem/ceftazidime/AZT/cefepime))


Leucocytosis -improved


Fever---resolved


Acute gallstone pancreatitis with persistent necrosis


  - 4/9.  CT A/P Increased ascites. Persistent evidence of necrotizing 

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


  - 4/27. status post ROBERT drain placement; C. parapsilosis. s/p drain 5/6 + yeast

& high amylase; s/p additional drain on 5/8. Drains removed. 


  -5/6. fluid  candida parapsilosis fluid, amylase high


  - 6/6 showed multiple pseudocysts, slight larger on the right. s/p drains x 3,

6/7.  + PSAE (MDRO-R Cefepime, Zosyn ANSON < 64) and yeast, 


  -6/7 s/p drain replacement x 3; fluid cult PSAE (MDRO), yeast; treated


  -7/12 CT A/P shows smaller fluid collections.  


  -722 CT abdomen and pelvis drains in place       


Ascites s/p paracentesis 4/15 & 5/6. C. parapsilosis 


Cholelithiasis with thickening of the gallbladder wall.


JED, Hyperkalemia, Metabolic acidosis off dialysis


Acute hypoxic resp failure. trach/vent. sputum 6/13  + PSAE (I merrem) ; sputum 

culture July 19+ for PSAE R Merrem, sensitive to cefepime


Pleural effusion status post CTS left side


Abdominal fluid culture 6 /7 MDRO Pseudomonas, yeast


Sputum culture positive 7/19 for MDRO Pseudomonas


Chest tube fluid positive for 7/21 Candida Parapsilosis


S/P Exploratory laparotomy, lysis of adhesions, subtotal cholecystectomy with 

cholangiogram, gastrojejunostomy tube placement, pancreatic necrosectomy














Plan


.


Stable from pulmonary stand point 


chest tube REMOVED 7/24


Transfuse as needed, monitor HGB


Antibiotics per ID, now off ABX 


Trach shield, as tolerated, on room air-- attempt PMV/Capped, will plan to de-

cannulate once more stronger


Up to chair, PT/OT


Follow surgery input-- no further surgical plans 


DVT GI prophylaxis


f/u BC /resp cultures 





DVT/GI PPX








Will see PRN call with any concerns 














SHARYN SOLORZANO MD                 Aug 10, 2020 12:08

## 2020-08-11 VITALS — DIASTOLIC BLOOD PRESSURE: 56 MMHG | SYSTOLIC BLOOD PRESSURE: 93 MMHG

## 2020-08-11 VITALS — SYSTOLIC BLOOD PRESSURE: 87 MMHG | DIASTOLIC BLOOD PRESSURE: 52 MMHG

## 2020-08-11 VITALS — DIASTOLIC BLOOD PRESSURE: 69 MMHG | SYSTOLIC BLOOD PRESSURE: 108 MMHG

## 2020-08-11 VITALS — DIASTOLIC BLOOD PRESSURE: 53 MMHG | SYSTOLIC BLOOD PRESSURE: 99 MMHG

## 2020-08-11 VITALS — DIASTOLIC BLOOD PRESSURE: 47 MMHG | SYSTOLIC BLOOD PRESSURE: 92 MMHG

## 2020-08-11 VITALS — SYSTOLIC BLOOD PRESSURE: 77 MMHG | DIASTOLIC BLOOD PRESSURE: 32 MMHG

## 2020-08-11 VITALS — DIASTOLIC BLOOD PRESSURE: 37 MMHG | SYSTOLIC BLOOD PRESSURE: 73 MMHG

## 2020-08-11 VITALS — DIASTOLIC BLOOD PRESSURE: 58 MMHG | SYSTOLIC BLOOD PRESSURE: 88 MMHG

## 2020-08-11 VITALS — SYSTOLIC BLOOD PRESSURE: 93 MMHG | DIASTOLIC BLOOD PRESSURE: 53 MMHG

## 2020-08-11 VITALS — DIASTOLIC BLOOD PRESSURE: 58 MMHG | SYSTOLIC BLOOD PRESSURE: 95 MMHG

## 2020-08-11 VITALS — SYSTOLIC BLOOD PRESSURE: 108 MMHG | DIASTOLIC BLOOD PRESSURE: 69 MMHG

## 2020-08-11 VITALS — SYSTOLIC BLOOD PRESSURE: 101 MMHG | DIASTOLIC BLOOD PRESSURE: 64 MMHG

## 2020-08-11 VITALS — DIASTOLIC BLOOD PRESSURE: 39 MMHG | SYSTOLIC BLOOD PRESSURE: 93 MMHG

## 2020-08-11 VITALS — DIASTOLIC BLOOD PRESSURE: 57 MMHG | SYSTOLIC BLOOD PRESSURE: 108 MMHG

## 2020-08-11 VITALS — SYSTOLIC BLOOD PRESSURE: 88 MMHG | DIASTOLIC BLOOD PRESSURE: 52 MMHG

## 2020-08-11 LAB
ALBUMIN SERPL-MCNC: 1.3 G/DL (ref 3.4–5)
ALBUMIN/GLOB SERPL: 0.4 {RATIO} (ref 1–1.7)
ALP SERPL-CCNC: 76 U/L (ref 46–116)
ALT SERPL-CCNC: 10 U/L (ref 14–59)
ANION GAP SERPL CALC-SCNC: 8 MMOL/L (ref 6–14)
ANION GAP SERPL CALC-SCNC: 9 MMOL/L (ref 6–14)
AST SERPL-CCNC: 15 U/L (ref 15–37)
BASE EXCESS ABG: -9 MMOL/L (ref -3–3)
BASOPHILS # BLD AUTO: 0 X10^3/UL (ref 0–0.2)
BASOPHILS NFR BLD: 0 % (ref 0–3)
BILIRUB SERPL-MCNC: 0.3 MG/DL (ref 0.2–1)
BUN SERPL-MCNC: 61 MG/DL (ref 7–20)
BUN SERPL-MCNC: 69 MG/DL (ref 7–20)
BUN/CREAT SERPL: 31 (ref 6–20)
CALCIUM SERPL-MCNC: 7.4 MG/DL (ref 8.5–10.1)
CALCIUM SERPL-MCNC: 8.2 MG/DL (ref 8.5–10.1)
CHLORIDE SERPL-SCNC: 113 MMOL/L (ref 98–107)
CHLORIDE SERPL-SCNC: 117 MMOL/L (ref 98–107)
CO2 SERPL-SCNC: 25 MMOL/L (ref 21–32)
CO2 SERPL-SCNC: 29 MMOL/L (ref 21–32)
CREAT SERPL-MCNC: 1.8 MG/DL (ref 0.6–1)
CREAT SERPL-MCNC: 2.2 MG/DL (ref 0.6–1)
EOSINOPHIL NFR BLD: 0 % (ref 0–3)
EOSINOPHIL NFR BLD: 0 X10^3/UL (ref 0–0.7)
ERYTHROCYTE [DISTWIDTH] IN BLOOD BY AUTOMATED COUNT: 20 % (ref 11.5–14.5)
GFR SERPLBLD BASED ON 1.73 SQ M-ARVRAT: 23.7 ML/MIN
GFR SERPLBLD BASED ON 1.73 SQ M-ARVRAT: 29.9 ML/MIN
GLOBULIN SER-MCNC: 3.6 G/DL (ref 2.2–3.8)
GLUCOSE SERPL-MCNC: 209 MG/DL (ref 70–99)
GLUCOSE SERPL-MCNC: 219 MG/DL (ref 70–99)
HCO3 BLDA-SCNC: 17 MMOL/L (ref 21–28)
HCT VFR BLD CALC: 23.4 % (ref 36–47)
HGB BLD-MCNC: 7.4 G/DL (ref 12–15.5)
INSPIRATION SETTING TIME VENT: (no result)
LYMPHOCYTES # BLD: 0.4 X10^3/UL (ref 1–4.8)
LYMPHOCYTES NFR BLD AUTO: 2 % (ref 24–48)
MCH RBC QN AUTO: 28 PG (ref 25–35)
MCHC RBC AUTO-ENTMCNC: 32 G/DL (ref 31–37)
MCV RBC AUTO: 89 FL (ref 79–100)
MONO #: 0.8 X10^3/UL (ref 0–1.1)
MONOCYTES NFR BLD: 4 % (ref 0–9)
NEUT #: 18 X10^3/UL (ref 1.8–7.7)
NEUTROPHILS NFR BLD AUTO: 93 % (ref 31–73)
PCO2 BLDA: 35 MMHG (ref 35–46)
PLATELET # BLD AUTO: 439 X10^3/UL (ref 140–400)
PO2 BLDA: 105 MMHG (ref 75–108)
POTASSIUM SERPL-SCNC: 5.2 MMOL/L (ref 3.5–5.1)
POTASSIUM SERPL-SCNC: 5.9 MMOL/L (ref 3.5–5.1)
PROT SERPL-MCNC: 4.9 G/DL (ref 6.4–8.2)
RBC # BLD AUTO: 2.64 X10^6/UL (ref 3.5–5.4)
SAO2 % BLDA: 98 % (ref 92–99)
SODIUM SERPL-SCNC: 147 MMOL/L (ref 136–145)
SODIUM SERPL-SCNC: 154 MMOL/L (ref 136–145)
WBC # BLD AUTO: 19.3 X10^3/UL (ref 4–11)

## 2020-08-11 RX ADMIN — SODIUM CHLORIDE, SODIUM LACTATE, POTASSIUM CHLORIDE, AND CALCIUM CHLORIDE SCH MLS/HR: .6; .31; .03; .02 INJECTION, SOLUTION INTRAVENOUS at 14:33

## 2020-08-11 RX ADMIN — IPRATROPIUM BROMIDE AND ALBUTEROL SULFATE SCH ML: .5; 3 SOLUTION RESPIRATORY (INHALATION) at 00:40

## 2020-08-11 RX ADMIN — HYDROCODONE BITARTRATE AND ACETAMINOPHEN PRN TAB: 5; 325 TABLET ORAL at 20:47

## 2020-08-11 RX ADMIN — ACETYLCYSTEINE SCH MG: 200 INHALANT RESPIRATORY (INHALATION) at 19:59

## 2020-08-11 RX ADMIN — HYDROCODONE BITARTRATE AND ACETAMINOPHEN PRN TAB: 5; 325 TABLET ORAL at 02:51

## 2020-08-11 RX ADMIN — IPRATROPIUM BROMIDE AND ALBUTEROL SULFATE SCH ML: .5; 3 SOLUTION RESPIRATORY (INHALATION) at 13:12

## 2020-08-11 RX ADMIN — IPRATROPIUM BROMIDE AND ALBUTEROL SULFATE SCH ML: .5; 3 SOLUTION RESPIRATORY (INHALATION) at 03:30

## 2020-08-11 RX ADMIN — HYDROCODONE BITARTRATE AND ACETAMINOPHEN PRN TAB: 5; 325 TABLET ORAL at 06:53

## 2020-08-11 RX ADMIN — Medication PRN APP: at 10:44

## 2020-08-11 RX ADMIN — MULTIVITAMIN SCH ML: LIQUID ORAL at 10:08

## 2020-08-11 RX ADMIN — IPRATROPIUM BROMIDE AND ALBUTEROL SULFATE SCH ML: .5; 3 SOLUTION RESPIRATORY (INHALATION) at 23:55

## 2020-08-11 RX ADMIN — ACETYLCYSTEINE SCH MG: 200 INHALANT RESPIRATORY (INHALATION) at 08:00

## 2020-08-11 RX ADMIN — INSULIN LISPRO SCH UNITS: 100 INJECTION, SOLUTION INTRAVENOUS; SUBCUTANEOUS at 18:00

## 2020-08-11 RX ADMIN — ENOXAPARIN SODIUM SCH MG: 40 INJECTION SUBCUTANEOUS at 10:09

## 2020-08-11 RX ADMIN — IPRATROPIUM BROMIDE AND ALBUTEROL SULFATE SCH ML: .5; 3 SOLUTION RESPIRATORY (INHALATION) at 08:11

## 2020-08-11 RX ADMIN — INSULIN LISPRO SCH UNITS: 100 INJECTION, SOLUTION INTRAVENOUS; SUBCUTANEOUS at 06:00

## 2020-08-11 RX ADMIN — CEFEPIME SCH GM: 2 INJECTION, POWDER, FOR SOLUTION INTRAVENOUS at 20:31

## 2020-08-11 RX ADMIN — BACITRACIN SCH MLS/HR: 5000 INJECTION, POWDER, FOR SOLUTION INTRAMUSCULAR at 20:47

## 2020-08-11 RX ADMIN — IPRATROPIUM BROMIDE AND ALBUTEROL SULFATE SCH ML: .5; 3 SOLUTION RESPIRATORY (INHALATION) at 16:53

## 2020-08-11 RX ADMIN — BACITRACIN SCH MLS/HR: 5000 INJECTION, POWDER, FOR SOLUTION INTRAMUSCULAR at 09:55

## 2020-08-11 RX ADMIN — PANTOPRAZOLE SODIUM SCH MG: 40 INJECTION, POWDER, FOR SOLUTION INTRAVENOUS at 09:42

## 2020-08-11 RX ADMIN — IPRATROPIUM BROMIDE AND ALBUTEROL SULFATE SCH ML: .5; 3 SOLUTION RESPIRATORY (INHALATION) at 19:59

## 2020-08-11 RX ADMIN — METOPROLOL TARTRATE PRN MG: 5 INJECTION INTRAVENOUS at 10:02

## 2020-08-11 RX ADMIN — SODIUM CHLORIDE, SODIUM LACTATE, POTASSIUM CHLORIDE, AND CALCIUM CHLORIDE SCH MLS/HR: .6; .31; .03; .02 INJECTION, SOLUTION INTRAVENOUS at 20:41

## 2020-08-11 RX ADMIN — INSULIN LISPRO SCH UNITS: 100 INJECTION, SOLUTION INTRAVENOUS; SUBCUTANEOUS at 12:00

## 2020-08-11 RX ADMIN — INSULIN LISPRO SCH UNITS: 100 INJECTION, SOLUTION INTRAVENOUS; SUBCUTANEOUS at 00:00

## 2020-08-11 RX ADMIN — CEFEPIME SCH GM: 2 INJECTION, POWDER, FOR SOLUTION INTRAVENOUS at 09:43

## 2020-08-11 RX ADMIN — HYDROCODONE BITARTRATE AND ACETAMINOPHEN PRN TAB: 5; 325 TABLET ORAL at 14:17

## 2020-08-11 NOTE — NUR
Rapid Respone called for patient.  

Uupn arrival in room.  Pt alert looking at nurses, RT at bedside and 4LNC currently on.  RN 
states BP low.  RR is in the 40's with a HR in the 130-145.  All drains checked to make sure 
pt did not have abundance on output.  RN stated that pt only had 60cc of urine last night.  
Dr Leyva had been notified and fluid bolus was infusing.  BP as follows

1350  77/32

1400  93/39

1405 86/37

1411 87/46

1415 88/49

1428 90/51

1430 91/51

1436 98/51

1441 90/52  Fluid bolus finished





ABG was drawn per RT and results called to Dr Castano and orders received.  

Dr Leyva at bedside and orders received.  New labs drawn,  cbc, cmp, lactic



Pt examined and abd palpated pt stated she had pain in the belly but it was not any 
different than previous.  

Medication given as per order and currently pt is resting with her eyes closed.



Doctors updated

-------------------------------------------------------------------------------

Addendum: 08/11/20 at 1454 by CANDACE MANNING RN

-------------------------------------------------------------------------------

Amended: Links added.

## 2020-08-11 NOTE — NUR
SS following up with discharge planning. SS reviewed pt chart and discussed with pt RN. Pt 
had rapid today. Pt currently requiring oxygen. Trach still in place. Pt on tube feeds. Pt 
on IV Flagyl and IV Cefepime. Pt continues to have drainage from drain sites. Pt max assist 
with PT/OT and  staff. Med Assist continuing to make attempts to contact pt's daughters for 
needed paperwork for disability application. SS will continue to follow for discharge 
planning.

## 2020-08-11 NOTE — NUR
Pepe cath taken out on day shift. Placed new 16Fr Indwelling Pepe Cath with 10ml balloon 
using sterile technique. Patient tolerated procedure well. Only 10 ml clear yellow urine out 
in pepe tubing upon placing pepe. IVF infusing as ordered. BP low normal with SBP 90s.

## 2020-08-11 NOTE — NUR
Patient tachypneic this morning & did not look well. Chest xray ordered. Dr. Leyva notified. 
Later this afternoon around 1315 patient seemed to be in distress breathing at the rate of 
52/min, BP was 73/37, , pupils dilated, febrile, diaphoretic. Dr. Leyva & Dr. Castano 
notified. Orders received. Rapid response called. Patient is now rest comfortably in bed & 
seems to be doing better. Patient's significant other updated. Will continue to monitor.

## 2020-08-11 NOTE — PDOC
Infectious Disease Note


Subjective:


Subjective


Pt resting, says feeling ok, trach


t max 102


T 99 this am





Vital Signs:


Vital Signs





Vital Signs








  Date Time  Temp Pulse Resp B/P (MAP) Pulse Ox O2 Delivery O2 Flow Rate FiO2


 


8/11/20 10:02  150  116/60    


 


8/11/20 08:14     100 Nasal Cannula 2.0 


 


8/11/20 07:00 97.3  40     





 97.3       











Physical Exam:


PHYSICAL EXAM


GENERAL: Resting in bed, NAD


HEENT: Pupils equal, oral cavity dry. OC/Op - Dry


NECK:  Tracheostomy - no JVD


LUNGS: Diminished aeration bases,


HEART:  S1, S2, 


ABDOMEN: Less distended, mild- bowel sounds hypoactive, soft, GJ drain present, 

drainage bag in place with depedent drainage


: [place] in place 


EXTREMITIES: Trace generalized edema, no cyanosis. Yeast in groin area


SKIN: warm touch. No signs of rash.  


NEURO: - Alert and responsive


RUE  PICC site without signs of complications. PIV s clean


EC fistula





Medications:


Inpatient Meds:





Current Medications








 Medications


  (Trade)  Dose


 Ordered  Sig/Yee  Start Time


 Stop Time Status Last Admin


Dose Admin


 


 Acetaminophen


  (Tylenol Supp)  650 mg  PRN Q6HRS  PRN  3/24/20 10:30


    8/10/20 15:43


650 MG


 


 Acetaminophen


  (Tylenol)  650 mg  PRN Q6HRS  PRN  3/21/20 03:36


 5/13/20 10:25 DC 4/16/20 19:56


650 MG


 


 Acetaminophen/


 Hydrocodone Bitart


  (Lortab 5/325)  1 tab  PRN Q4HRS  PRN  7/23/20 16:00


    8/11/20 06:53


1 TAB


 


 Acetylcysteine


  (Mucomyst 20%


 Resp Treatment)  600 mg  RTBID  6/27/20 12:00


    8/11/20 08:00


600 MG


 


 Albumin Human  500 ml @ 


 125 mls/hr  PRN Q1HR  PRN  6/30/20 15:45


 8/3/20 09:52 DC  





 


 Albuterol Sulfate


  (Ventolin Neb


 Soln)  2.5 mg  1X  ONCE  3/17/20 22:30


 3/17/20 22:31 DC 3/18/20 00:56


2.5 MG


 


 Albuterol/


 Ipratropium


  (Duoneb)  3 ml  Q4HRS  6/13/20 08:00


    8/11/20 08:11


3 ML


 


 Alprazolam


  (Xanax)  0.5 mg  PRN QID  PRN  7/23/20 16:00


    8/11/20 02:50


0.5 MG


 


 Alteplase,


 Recombinant


  (Cathflo For


 Central Catheter


 Clearance)  1 mg  1X  ONCE  8/5/20 07:00


 8/5/20 07:01 DC 8/5/20 09:30


1 MG


 


 Alteplase,


 Recombinant 4 mg/


 Sodium Chloride  20 ml @ 20


 mls/hr  1X  ONCE  6/17/20 10:00


 6/17/20 10:59 DC 6/17/20 10:09


20 MLS/HR


 


 Alteplase,


 Recombinant 5 mg/


 Sodium Chloride  30 ml @ 30


 mls/hr  1X  PRN  8/5/20 06:45


   UNV  





 


 Amino Acids/


 Glycerin/


 Electrolytes  1,000 ml @ 


 80 mls/hr  [id number]  7/26/20 10:15


 8/2/20 07:07 DC 8/1/20 18:10


80 MLS/HR


 


 Artificial Tears


  (Artificial


 Tears)  1 drop  PRN Q15MIN  PRN  4/29/20 05:30


    6/23/20 21:17


1 DROP


 


 Atenolol


  (Tenormin)  100 mg  DAILY  3/17/20 09:00


 3/16/20 20:08 DC  





 


 Atropine Sulfate


  (ATROPINE 0.5mg


 SYRINGE)  0.5 mg  PRN Q5MIN  PRN  4/2/20 08:15


 8/3/20 09:45 DC  





 


 Barium Sulfate


  (Varibar Thin


 Liquid Apple)  148 gm  1X  ONCE  5/26/20 11:45


 5/26/20 11:49 DC  





 


 Benzocaine


  (Hurricaine One)  1 spray  1X  ONCE  3/20/20 14:30


 3/20/20 14:31 DC 3/20/20 16:38


1 SPRAY


 


 Bisacodyl


  (Dulcolax Supp)  10 mg  STK-MED ONCE  4/27/20 10:59


 4/27/20 10:59 DC  





 


 Bumetanide


  (Bumex)  2 mg  DAILY  5/8/20 10:00


 5/18/20 17:15 DC 5/18/20 08:07


2 MG


 


 Bupivacaine HCl/


 Epinephrine Bitart


  (Sensorcain-Epi


 0.5%-1:826904 Mpf)  30 ml  STK-MED ONCE  6/30/20 08:34


 6/30/20 08:35 DC  





 


 Calcitonin [place]


  (Miacalcin)  400 unit  BID  8/6/20 18:00


 8/7/20 15:56 DC 8/6/20 18:18


400 UNIT


 


 Calcium Carbonate/


 Glycine


  (Tums)  500 mg  PRN [id number]  PRN  3/18/20 17:45


 5/13/20 10:25 DC  





 


 Calcium Chloride


 1000 mg/Sodium


 Chloride  110 ml @ 


 220 mls/hr  1X  ONCE  3/17/20 22:30


 3/17/20 22:59 DC 3/17/20 22:11


220 MLS/HR


 


 Calcium Chloride


 3000 mg/Sodium


 Chloride  1,030 ml @ 


 50 mls/hr  [id number]  3/19/20 08:00


 3/21/20 15:23 DC 3/21/20 02:17


50 MLS/HR


 


 Calcium Gluconate


  (Calcium


 Gluconate)  2,000 mg  1X  ONCE  3/19/20 02:15


 3/19/20 02:16 DC 3/19/20 02:19


2,000 MG


 


 Calcium Gluconate


 1000 mg/Sodium


 Chloride  110 ml @ 


 220 mls/hr  1X  ONCE  3/18/20 03:30


 3/18/20 03:59 DC 3/18/20 03:21


220 MLS/HR


 


 Calcium Gluconate


 2000 mg/Sodium


 Chloride  120 ml @ 


 220 mls/hr  1X  ONCE  3/18/20 07:30


 3/18/20 08:02 DC 3/18/20 09:05


220 MLS/HR


 


 Cefepime HCl


  (Maxipime)  2 gm  Q12HR  8/10/20 10:00


    8/11/20 09:43


2 GM


 


 Ceftazidime/


 Avibactam 2.5 gm/


 Sodium Chloride  250 ml @ 


 125 mls/hr  Q8HRS  7/23/20 08:00


 8/5/20 08:20 DC 8/5/20 05:38


125 MLS/HR


 


 Cellulose


  (Surgicel


 Fibrillar 1x2)  1 each  STK-MED ONCE  4/6/20 11:00


 4/6/20 11:01 DC  





 


 Cellulose


  (Surgicel


 Hemostat 2x14)  1 each  STK-MED ONCE  4/27/20 10:58


 4/27/20 10:59 DC  





 


 Cellulose


  (Surgicel


 Hemostat 4x8)  1 each  STK-MED ONCE  4/27/20 10:58


 4/27/20 10:59 DC  





 


 Chlorhexidine


 Gluconate


  (Peridex)  15 ml  BID  6/13/20 09:00


 6/13/20 07:58 DC  





 


 Ciprofloxacin/


 Dextrose  200 ml @ 


 200 mls/hr  Q12HR  7/12/20 10:00


 7/21/20 08:20 DC 7/20/20 21:02


200 MLS/HR


 


 Cyclobenzaprine


 HCl


  (Flexeril)  10 mg  PRN Q6HRS  PRN  4/30/20 10:45


    8/8/20 20:50


10 MG


 


 Daptomycin 410 mg/


 Sodium Chloride  50 ml @ 


 100 mls/hr  Q24H  6/7/20 14:00


 6/10/20 08:30 DC 6/9/20 13:33


100 MLS/HR


 


 Daptomycin 430 mg/


 Sodium Chloride  50 ml @ 


 100 mls/hr  Q24H  4/25/20 13:00


 4/30/20 20:58 DC 4/30/20 13:00


100 MLS/HR


 


 Daptomycin 450 mg/


 Sodium Chloride  50 ml @ 


 100 mls/hr  Q24H  5/17/20 09:00


 5/21/20 08:30 DC 5/20/20 09:25


100 MLS/HR


 


 Daptomycin 485 mg/


 Sodium Chloride  50 ml @ 


 100 mls/hr  Q24H  5/4/20 11:00


 5/12/20 07:44 DC 5/11/20 13:10


100 MLS/HR


 


 Daptomycin 500 mg/


 Sodium Chloride  50 ml @ 


 100 mls/hr  Q24H  7/26/20 09:00


 8/5/20 08:20 DC 8/4/20 09:20


100 MLS/HR


 


 Desflurane


  (Suprane)  90 ml  STK-MED ONCE  6/30/20 10:18


 6/30/20 10:19 DC  





 


 Dexamethasone


 Sodium Phosphate


  (Decadron)  4 mg  STK-MED ONCE  4/27/20 10:56


 4/27/20 10:57 DC  





 


 Dexmedetomidine


 HCl 400 mcg/


 Sodium Chloride  100 ml @ 0


 mls/hr  CONT  PRN  4/2/20 08:15


 5/30/20 18:31 DC 5/30/20 12:57


8 MLS/HR


 


 Dextrose


  (Dextrose


 50%-Water Syringe)  12.5 gm  PRN Q15MIN  PRN  3/16/20 09:30


     





 


 Digoxin


  (Lanoxin)  125 mcg  1X  ONCE  3/19/20 18:00


 3/19/20 18:01 DC 3/19/20 17:10


125 MCG


 


 Diphenhydramine


 HCl


  (Benadryl)  25 mg  1X  ONCE  7/16/20 19:00


 7/16/20 19:01 DC 7/16/20 18:56


25 MG


 


 Duloxetine HCl


  (Cymbalta)  30 mg  DAILY  5/10/20 14:00


 5/13/20 10:25 DC 5/11/20 09:48


30 MG


 


 Enoxaparin Sodium


  (Lovenox 100mg


 Syringe)  100 mg  Q12HR  4/21/20 21:00


   UNV  





 


 Enoxaparin Sodium


  (Lovenox 40mg


 Syringe)  40 mg  Q24H  7/1/20 08:00


    8/11/20 10:09


40 MG


 


 Ephedrine Sulfate


  (ePHEDrine PF IN


 SALINE SYRINGE)  50 mg  STK-MED ONCE  6/30/20 14:45


 6/30/20 14:45 DC  





 


 Etomidate


  (Amidate)  8 mg  1X  ONCE  3/23/20 08:30


 3/23/20 08:31 DC 3/23/20 08:33


8 MG


 


 Fentanyl


  (Duragesic 12mcg/


 Hr Patch)  1 patch  Q3DAYS  7/10/20 09:00


    8/9/20 09:09


1 PATCH


 


 Fentanyl


  (Duragesic 50mcg/


 Hr Patch)  1 patch  Q72H  6/4/20 21:00


 6/13/20 12:00 DC 6/4/20 21:22


1 PATCH


 


 Fentanyl Citrate


  (Fentanyl 2ml


 Vial)  100 mcg  STK-MED ONCE  8/4/20 15:03


 8/4/20 15:03 DC  





 


 Fentanyl Citrate


  (Fentanyl 5ml


 Vial)  250 mcg  1X  ONCE  5/8/20 09:15


 5/8/20 09:16 DC 5/8/20 09:30


50 MCG


 


 Flumazenil


  (Romazicon)  0.5 mg  STK-MED ONCE  6/7/20 14:48


 6/7/20 14:48 DC  





 


 Fluoxetine HCl


  (PROzac)  20 mg  QHS  6/4/20 21:00


    8/10/20 20:26


20 MG


 


 Furosemide


  (Lasix)  40 mg  [id number]  8/6/20 09:00


 8/6/20 14:01 DC 8/6/20 13:51


40 MG


 


 Haloperidol


 Lactate


  (Haldol Inj)  3 mg  1X  ONCE  5/4/20 14:30


 5/4/20 14:31 DC 5/4/20 14:37


3 MG


 


 Heparin Sodium


  (Porcine)


  (Hep Lock Adult)  500 unit  STK-MED ONCE  4/7/20 09:29


 4/7/20 09:30 DC  





 


 Heparin Sodium


  (Porcine)


  (Heparin Sodium)  5,000 unit  Q12HR  4/27/20 21:00


 5/7/20 09:59 DC 5/6/20 20:57


5,000 UNIT


 


 Heparin Sodium


  (Porcine) 1000


 unit/Sodium


 Chloride  1,001 ml @ 


 1,001 mls/hr  1X  ONCE  6/30/20 06:00


 6/30/20 06:59 DC  





 


 Hydromorphone HCl


  (Dilaudid


 Standard PCA)  12 mg  STK-MED ONCE  5/1/20 15:50


 5/12/20 11:24 DC  





 


 Hydromorphone HCl


  (Dilaudid)  1 mg  PRN Q4HRS  PRN  5/4/20 19:00


 5/18/20 17:10 DC 5/18/20 06:25


1 MG


 


 Info


  (CONTRAST GIVEN


 -- Rx MONITORING)  1 each  PRN DAILY  PRN  7/21/20 11:45


 7/23/20 11:44 DC  





 


 Info


  (Icu Electrolyte


 Protocol)  1 ea  CONT PRN  PRN  3/29/20 13:15


     





 


 Info


  (PHARMACY


 MONITORING -- do


 not chart)  1 each  PRN DAILY  PRN  4/24/20 15:45


 5/26/20 14:14 DC  





 


 Info


  (Tpn Per


 Pharmacy)  1 each  PRN DAILY  PRN  3/18/20 12:30


   UNV  





 


 Insulin Human


 Lispro


  (HumaLOG)  0-9 UNITS  Q6HRS  3/16/20 09:30


    8/10/20 19:01


3 UNITS


 


 Insulin Human


 Regular


  (HumuLIN R VIAL)  5 unit  1X  ONCE  3/17/20 22:30


 3/17/20 22:31 DC 3/17/20 22:14


5 UNIT


 


 Iohexol


  (Omnipaque 240


 Mg/ml)  10 ml  1X  ONCE  8/4/20 15:45


 8/4/20 15:46 DC 8/4/20 15:00


10 ML


 


 Iohexol


  (Omnipaque 300


 Mg/ml)  50 ml  1X  ONCE  7/28/20 11:00


 7/28/20 11:01 DC  





 


 Iohexol


  (Omnipaque 350


 Mg/ml)  90 ml  1X  ONCE  3/16/20 03:30


 3/16/20 03:31 DC 3/16/20 03:25


90 ML


 


 Ketorolac


 Tromethamine


  (Toradol 30mg


 Vial)  30 mg  1X  ONCE  3/16/20 03:00


 3/16/20 03:01 DC 3/16/20 02:54


30 MG


 


 Lidocaine HCl


  (Buffered


 Lidocaine 1%)  5 ml  1X  ONCE  8/4/20 15:45


 8/4/20 15:46 DC 8/4/20 15:00


5 ML


 


 Lidocaine HCl


  (Glydo


  (Lidocaine) Jelly)  1 thomas  1X  ONCE  3/20/20 14:30


 3/20/20 14:31 DC 3/20/20 16:38


1 THOMAS


 


 Lidocaine HCl


  (Lidocaine 1%


 20ml Vial)  20 ml  1X  ONCE  6/7/20 15:00


 6/7/20 15:01 DC 6/7/20 15:30


20 ML


 


 Lidocaine HCl


  (Lidocaine Pf 2%


 Vial)  5 ml  STK-MED ONCE  6/30/20 07:44


 6/30/20 07:44 DC  





 


 Lidocaine HCl


  (Xylocaine-Mpf


 1% 2ml Vial)  2 ml  PRN 1X  PRN  4/27/20 07:00


 4/28/20 06:59 DC  





 


 Linezolid/Dextrose  300 ml @ 


 300 mls/hr  Q12HR  5/17/20 09:00


 5/20/20 08:11 DC 5/19/20 21:08


300 MLS/HR


 


 Lorazepam


  (Ativan Inj)  0.25 mg  PRN Q4HRS  PRN  6/3/20 07:30


    8/11/20 09:54


0.25 MG


 


 Magnesium Sulfate  50 ml @ 25


 mls/hr  1X  ONCE  6/30/20 16:30


 6/30/20 18:29 DC 6/30/20 17:02


25 MLS/HR


 


 Meropenem 1 gm/


 Sodium Chloride  100 ml @ 


 200 mls/hr  Q12HR  6/7/20 21:00


 6/25/20 08:56 DC 6/25/20 08:27


200 MLS/HR


 


 Meropenem 500 mg/


 Sodium Chloride  50 ml @ 


 100 mls/hr  Q6HRS  6/28/20 18:00


 7/21/20 08:23 DC 7/21/20 06:15


100 MLS/HR


 


 Methylprednisolone


 Sodium Succinate


  (SOLU-Medrol


 125MG VIAL)  125 mg  1X  ONCE  6/13/20 06:15


 6/13/20 06:16 DC 6/13/20 06:26


125 MG


 


 Metoclopramide HCl


  (Reglan Vial)  10 mg  PRN Q3HRS  PRN  5/9/20 16:45


    5/14/20 04:25


10 MG


 


 Metoprolol


 Tartrate


  (Lopressor Vial)  5 mg  1X  ONCE  8/9/20 10:45


 8/9/20 10:46 DC 8/9/20 10:54


5 MG


 


 Metronidazole  100 ml @ 


 100 mls/hr  Q12HR  8/10/20 10:00


    8/11/20 09:52


100 MLS/HR


 


 Micafungin Sodium


 100 mg/Dextrose  100 ml @ 


 100 mls/hr  Q24H  6/30/20 08:30


 8/5/20 08:20 DC 8/4/20 09:30


100 MLS/HR


 


 Midazolam HCl


  (Versed)  2 mg  1X  ONCE  6/7/20 15:00


 6/7/20 15:01 DC 6/7/20 15:28


1 MG


 


 Midazolam HCl 100


 mg/Sodium Chloride  100 ml @ 1


 mls/hr  CONT  PRN  6/30/20 14:45


 8/3/20 09:45 DC 7/3/20 18:48


10 MLS/HR


 


 Midazolam HCl 50


 mg/Sodium Chloride  50 ml @ 0


 mls/hr  CONT  PRN  3/23/20 08:15


 3/28/20 15:59 DC 3/26/20 22:39


7 MLS/HR


 


 Morphine Sulfate


  (Morphine


 Sulfate)  1 mg  PRN Q1HR  PRN  6/30/20 14:45


 8/3/20 09:45 DC 7/25/20 18:28


1 MG


 


 Multi-Ingred


 Cream/Lotion/Oil/


 Oint


  (Artificial


 Tears Eye


 Ointment)  1 thomas  PRN Q1HR  PRN  3/25/20 17:30


 6/3/20 14:39 DC 4/13/20 08:19


1 THOMAS


 


 Multivitamins/


 Minerals


 Therapeutic


  (Centrum


 Multivit-Mineral


 Liq)  5 ml  DAILY  8/5/20 09:00


    8/11/20 10:08


5 ML


 


 Naloxone HCl


  (Narcan)  0.4 mg  PRN Q2MIN  PRN  6/30/20 14:45


 8/3/20 09:45 DC  





 


 Norepinephrine


 Bitartrate 8 mg/


 Dextrose  258 ml @ 


 13.332 mls/


 hr  CONT  PRN  6/7/20 06:30


 8/3/20 09:45 DC 7/2/20 09:09


1.6 MLS/HR


 


 Ondansetron HCl


  (Zofran)  4 mg  STK-MED ONCE  6/30/20 13:33


 6/30/20 13:33 DC  





 


 Pantoprazole


 Sodium


  (PROTONIX VIAL


 for IV PUSH)  40 mg  DAILYAC  3/16/20 11:30


    8/11/20 09:42


40 MG


 


 Phenylephrine HCl


  (Ken-Synephrine


 Inj)  10 mg  STK-MED ONCE  6/30/20 13:33


 6/30/20 13:33 DC  





 


 Phenylephrine HCl


  (PHENYLEPHRINE


 in 0.9% NACL PF)  1 mg  STK-MED ONCE  6/30/20 14:44


 6/30/20 14:45 DC  





 


 Piperacillin Sod/


 Tazobactam Sod


 3.375 gm/Sodium


 Chloride  50 ml @ 


 100 mls/hr  Q6HRS  5/27/20 12:00


 6/4/20 07:26 DC 6/4/20 06:10


100 MLS/HR


 


 Piperacillin Sod/


 Tazobactam Sod


 4.5 gm/Sodium


 Chloride  100 ml @ 


 200 mls/hr  1X  ONCE  3/16/20 06:00


 3/16/20 06:29 DC 3/16/20 05:44


200 MLS/HR


 


 Potassium


 Chloride 110 meq/


 Magnesium Sulfate


 20 meq/


 Multivitamins 10


 ml/Chromium/


 Copper/Manganese/


 Seleni/Zn 1 ml/


 Insulin Human


 Regular 15 unit/


 Total Parenteral


 Nutrition/Amino


 Acids/Dextrose/


 Fat Emulsion


 Intravenous  1,800 ml @ 


 75 mls/hr  TPN  CONT  5/24/20 22:00


 5/25/20 21:59 DC 5/24/20 22:48


75 MLS/HR


 


 Potassium


 Chloride 15 meq/


 Bicarbonate


 Dialysis Soln w/


 out KCl  5,007.5 ml


  @ 1,000 mls/


 hr  Q5H1M  3/29/20 20:00


 4/2/20 13:08 DC 4/1/20 18:14


1,000 MLS/HR


 


 Potassium


 Chloride 20 meq/


 Bicarbonate


 Dialysis Soln w/


 out KCl  5,010 ml @ 


 1,000 mls/hr  Q5H1M  3/25/20 16:00


 3/29/20 19:59 DC 3/29/20 14:54


1,000 MLS/HR


 


 Potassium


 Chloride 40 meq/


 Potassium Acetate


 60 meq/Magnesium


 Sulfate 10 meq/


 Multivitamins 10


 ml/Chromium/


 Copper/Manganese/


 Seleni/Zn 1 ml/


 Insulin Human


 Regular 20 unit/


 Total Parenteral


 Nutrition/Amino


 Acids/Dextrose/


 Fat Emulsion


 Intravenous  1,800 ml @ 


 75 mls/hr  TPN  CONT  6/4/20 22:00


 6/5/20 21:59 DC 6/5/20 00:03


75 MLS/HR


 


 Potassium


 Chloride 70 meq/


 Magnesium Sulfate


 20 meq/


 Multivitamins 10


 ml/Chromium/


 Copper/Manganese/


 Seleni/Zn 1 ml/


 Insulin Human


 Regular 15 unit/


 Total Parenteral


 Nutrition/Amino


 Acids/Dextrose/


 Fat Emulsion


 Intravenous  1,800 ml @ 


 75 mls/hr  TPN  CONT  5/29/20 22:00


 5/30/20 21:59 DC 5/29/20 23:13


75 MLS/HR


 


 Potassium


 Chloride 75 meq/


 Magnesium Sulfate


 15 meq/


 Multivitamins 10


 ml/Chromium/


 Copper/Manganese/


 Seleni/Zn 0.5 ml/


 Insulin Human


 Regular 15 unit/


 Total Parenteral


 Nutrition/Amino


 Acids/Dextrose/


 Fat Emulsion


 Intravenous  1,920 ml @ 


 80 mls/hr  TPN  CONT  5/9/20 22:00


 5/10/20 21:59 DC 5/9/20 22:41


80 MLS/HR


 


 Potassium


 Chloride 75 meq/


 Magnesium Sulfate


 15 meq/Calcium


 Gluconate 8 meq/


 Multivitamins 10


 ml/Chromium/


 Copper/Manganese/


 Seleni/Zn 0.5 ml/


 Insulin Human


 Regular 15 unit/


 Total Parenteral


 Nutrition/Amino


 Acids/Dextrose/


 Fat Emulsion


 Intravenous  1,920 ml @ 


 80 mls/hr  TPN  CONT  5/7/20 22:00


 5/8/20 21:59 DC 5/7/20 22:28


80 MLS/HR


 


 Potassium


 Chloride 75 meq/


 Magnesium Sulfate


 15 meq/Calcium


 Gluconate 8 meq/


 Multivitamins 10


 ml/Chromium/


 Copper/Manganese/


 Seleni/Zn 0.5 ml/


 Insulin Human


 Regular 20 unit/


 Total Parenteral


 Nutrition/Amino


 Acids/Dextrose/


 Fat Emulsion


 Intravenous  1,920 ml @ 


 80 mls/hr  TPN  CONT  5/6/20 22:00


 5/7/20 21:59 DC 5/6/20 22:00


80 MLS/HR


 


 Potassium


 Chloride 75 meq/


 Magnesium Sulfate


 15 meq/Calcium


 Gluconate 8 meq/


 Multivitamins 10


 ml/Chromium/


 Copper/Manganese/


 Seleni/Zn 0.5 ml/


 Insulin Human


 Regular 25 unit/


 Total Parenteral


 Nutrition/Amino


 Acids/Dextrose/


 Fat Emulsion


 Intravenous  1,920 ml @ 


 80 mls/hr  TPN  CONT  5/4/20 22:00


 5/5/20 21:59 DC 5/4/20 23:08


80 MLS/HR


 


 Potassium


 Chloride 75 meq/


 Magnesium Sulfate


 20 meq/Calcium


 Gluconate 10 meq/


 Multivitamins 10


 ml/Chromium/


 Copper/Manganese/


 Seleni/Zn 0.5 ml/


 Insulin Human


 Regular 25 unit/


 Total Parenteral


 Nutrition/Amino


 Acids/Dextrose/


 Fat Emulsion


 Intravenous  1,920 ml @ 


 80 mls/hr  TPN  CONT  5/3/20 22:00


 5/4/20 21:59 DC 5/3/20 22:04


80 MLS/HR


 


 Potassium


 Chloride 75 meq/


 Magnesium Sulfate


 20 meq/Calcium


 Gluconate 10 meq/


 Multivitamins 10


 ml/Chromium/


 Copper/Manganese/


 Seleni/Zn 0.5 ml/


 Insulin Human


 Regular 30 unit/


 Total Parenteral


 Nutrition/Amino


 Acids/Dextrose/


 Fat Emulsion


 Intravenous  1,920 ml @ 


 80 mls/hr  TPN  CONT  5/2/20 22:00


 5/3/20 22:00 DC 5/2/20 21:51


80 MLS/HR


 


 Potassium


 Chloride 80 meq/


 Magnesium Sulfate


 20 meq/


 Multivitamins 10


 ml/Chromium/


 Copper/Manganese/


 Seleni/Zn 0.5 ml/


 Insulin Human


 Regular 15 unit/


 Total Parenteral


 Nutrition/Amino


 Acids/Dextrose/


 Fat Emulsion


 Intravenous  1,920 ml @ 


 80 mls/hr  TPN  CONT  5/12/20 22:00


 5/13/20 21:59 DC 5/12/20 21:40


80 MLS/HR


 


 Potassium


 Chloride 80 meq/


 Magnesium Sulfate


 20 meq/


 Multivitamins 10


 ml/Chromium/


 Copper/Manganese/


 Seleni/Zn 1 ml/


 Insulin Human


 Regular 15 unit/


 Total Parenteral


 Nutrition/Amino


 Acids/Dextrose/


 Fat Emulsion


 Intravenous  1,800 ml @ 


 75 mls/hr  TPN  CONT  5/31/20 22:00


 6/1/20 21:59 DC 5/31/20 21:54


75 MLS/HR


 


 Potassium


 Chloride 90 meq/


 Magnesium Sulfate


 20 meq/


 Multivitamins 10


 ml/Chromium/


 Copper/Manganese/


 Seleni/Zn 1 ml/


 Insulin Human


 Regular 15 unit/


 Total Parenteral


 Nutrition/Amino


 Acids/Dextrose/


 Fat Emulsion


 Intravenous  1,800 ml @ 


 75 mls/hr  TPN  CONT  5/20/20 22:00


 5/21/20 21:59 DC 5/20/20 22:28


75 MLS/HR


 


 Potassium


 Chloride 90 meq/


 Magnesium Sulfate


 20 meq/


 Multivitamins 10


 ml/Chromium/


 Copper/Manganese/


 Seleni/Zn 1 ml/


 Insulin Human


 Regular 20 unit/


 Total Parenteral


 Nutrition/Amino


 Acids/Dextrose/


 Fat Emulsion


 Intravenous  1,800 ml @ 


 75 mls/hr  TPN  CONT  6/3/20 22:00


 6/4/20 21:59 DC 6/3/20 23:13


75 MLS/HR


 


 Potassium


 Chloride/Water  100 ml @ 


 100 mls/hr  Q1H  6/17/20 08:00


 6/17/20 09:59 DC 6/17/20 09:12


100 MLS/HR


 


 Potassium


 Phosphate 20 mmol/


 Sodium Chloride  106.6667


 ml @ 


 51.667 m...  1X  ONCE  3/25/20 13:00


 3/25/20 15:03 DC 3/25/20 12:51


51.667 MLS/HR


 


 Potassium Acetate


 30 meq/Magnesium


 Sulfate 14 meq/


 Multivitamins 10


 ml/Chromium/


 Copper/Manganese/


 Seleni/Zn 1 ml/


 Insulin Human


 Regular 15 unit/


 Sodium Chloride


 20 meq/Potassium


 Chloride 30 meq/


 Total Parenteral


 Nutrition/Amino


 Acids/Dextrose/


 Fat Emulsion


 Intravenous  1,920 ml @ 


 80 mls/hr  TPN  CONT  6/23/20 22:00


 6/24/20 21:59 DC 6/23/20 21:46


80 MLS/HR


 


 Potassium Acetate


 30 meq/Magnesium


 Sulfate 20 meq/


 Calcium Gluconate


 10 meq/


 Multivitamins 10


 ml/Chromium/


 Copper/Manganese/


 Seleni/Zn 0.5 ml/


 Insulin Human


 Regular 30 unit/


 Potassium


 Chloride 30 meq/


 Total Parenteral


 Nutrition/Amino


 Acids/Dextrose/


 Fat Emulsion


 Intravenous  1,920 ml @ 


 80 mls/hr  TPN  CONT  5/1/20 22:00


 5/2/20 21:59 DC 5/1/20 22:34


80 MLS/HR


 


 Potassium Acetate


 40 meq/Magnesium


 Sulfate 10 meq/


 Multivitamins 10


 ml/Chromium/


 Copper/Manganese/


 Seleni/Zn 1 ml/


 Insulin Human


 Regular 20 unit/


 Total Parenteral


 Nutrition/Amino


 Acids/Dextrose/


 Fat Emulsion


 Intravenous  1,920 ml @ 


 80 mls/hr  TPN  CONT  6/16/20 22:00


 6/17/20 21:59 DC 6/16/20 21:32


80 MLS/HR


 


 Potassium Acetate


 40 meq/Magnesium


 Sulfate 5 meq/


 Multivitamins 10


 ml/Chromium/


 Copper/Manganese/


 Seleni/Zn 1 ml/


 Insulin Human


 Regular 30 unit/


 Total Parenteral


 Nutrition/Amino


 Acids/Dextrose/


 Fat Emulsion


 Intravenous  1,920 ml @ 


 80 mls/hr  TPN  CONT  6/15/20 22:00


 6/16/20 19:34 DC 6/15/20 21:54


80 MLS/HR


 


 Potassium Acetate


 55 meq/Magnesium


 Sulfate 20 meq/


 Calcium Gluconate


 10 meq/


 Multivitamins 10


 ml/Chromium/


 Copper/Manganese/


 Seleni/Zn 0.5 ml/


 Insulin Human


 Regular 30 unit/


 Total Parenteral


 Nutrition/Amino


 Acids/Dextrose/


 Fat Emulsion


 Intravenous  1,920 ml @ 


 80 mls/hr  TPN  CONT  4/30/20 22:00


 5/1/20 21:59 DC 5/1/20 01:00


80 MLS/HR


 


 Potassium Acetate


 55 meq/Magnesium


 Sulfate 20 meq/


 Calcium Gluconate


 10 meq/


 Multivitamins 10


 ml/Chromium/


 Copper/Manganese/


 Seleni/Zn 0.5 ml/


 Insulin Human


 Regular 35 unit/


 Total Parenteral


 Nutrition/Amino


 Acids/Dextrose/


 Fat Emulsion


 Intravenous  1,920 ml @ 


 80 mls/hr  TPN  CONT  4/28/20 22:00


 4/29/20 21:59 DC 4/28/20 22:02


80 MLS/HR


 


 Potassium Acetate


 60 meq/Magnesium


 Sulfate 10 meq/


 Multivitamins 10


 ml/Chromium/


 Copper/Manganese/


 Seleni/Zn 1 ml/


 Insulin Human


 Regular 20 unit/


 Total Parenteral


 Nutrition/Amino


 Acids/Dextrose/


 Fat Emulsion


 Intravenous  1,920 ml @ 


 80 mls/hr  TPN  CONT  6/17/20 22:00


 6/18/20 21:59 DC 6/17/20 21:55


80 MLS/HR


 


 Potassium Acetate


 60 meq/Magnesium


 Sulfate 14 meq/


 Multivitamins 10


 ml/Chromium/


 Copper/Manganese/


 Seleni/Zn 1 ml/


 Insulin Human


 Regular 15 unit/


 Sodium Chloride


 20 meq/Total


 Parenteral


 Nutrition/Amino


 Acids/Dextrose/


 Fat Emulsion


 Intravenous  1,920 ml @ 


 80 mls/hr  TPN  CONT  6/22/20 22:00


 6/23/20 21:59 DC 6/22/20 21:54


80 MLS/HR


 


 Potassium Acetate


 60 meq/Magnesium


 Sulfate 14 meq/


 Multivitamins 10


 ml/Chromium/


 Copper/Manganese/


 Seleni/Zn 1 ml/


 Insulin Human


 Regular 15 unit/


 Total Parenteral


 Nutrition/Amino


 Acids/Dextrose/


 Fat Emulsion


 Intravenous  1,920 ml @ 


 80 mls/hr  TPN  CONT  6/21/20 22:00


 6/22/20 21:59 DC 6/21/20 22:22


80 MLS/HR


 


 Potassium Acetate


 60 meq/Magnesium


 Sulfate 14 meq/


 Multivitamins 10


 ml/Chromium/


 Copper/Manganese/


 Seleni/Zn 1 ml/


 Insulin Human


 Regular 20 unit/


 Total Parenteral


 Nutrition/Amino


 Acids/Dextrose/


 Fat Emulsion


 Intravenous  1,920 ml @ 


 80 mls/hr  TPN  CONT  6/18/20 22:00


 6/19/20 21:59 DC 6/18/20 22:26


80 MLS/HR


 


 Potassium Acetate


 60 meq/Magnesium


 Sulfate 5 meq/


 Multivitamins 10


 ml/Chromium/


 Copper/Manganese/


 Seleni/Zn 1 ml/


 Insulin Human


 Regular 30 unit/


 Total Parenteral


 Nutrition/Amino


 Acids/Dextrose/


 Fat Emulsion


 Intravenous  1,920 ml @ 


 80 mls/hr  TPN  CONT  6/6/20 22:00


 6/7/20 21:59 DC 6/6/20 21:54


80 MLS/HR


 


 Potassium Acetate


 65 meq/Magnesium


 Sulfate 20 meq/


 Calcium Gluconate


 10 meq/


 Multivitamins 10


 ml/Chromium/


 Copper/Manganese/


 Seleni/Zn 0.5 ml/


 Insulin Human


 Regular 30 unit/


 Total Parenteral


 Nutrition/Amino


 Acids/Dextrose/


 Fat Emulsion


 Intravenous  1,920 ml @ 


 80 mls/hr  TPN  CONT  4/29/20 22:00


 4/30/20 21:59 DC 4/29/20 22:22


80 MLS/HR


 


 Potassium Acetate


 80 meq/Magnesium


 Sulfate 5 meq/


 Multivitamins 10


 ml/Chromium/


 Copper/Manganese/


 Seleni/Zn 1 ml/


 Insulin Human


 Regular 20 unit/


 Total Parenteral


 Nutrition/Amino


 Acids/Dextrose/


 Fat Emulsion


 Intravenous  1,920 ml @ 


 80 mls/hr  TPN  CONT  6/5/20 22:00


 6/6/20 21:59 DC 6/5/20 21:59


80 MLS/HR


 


 Prochlorperazine


 Edisylate


  (Compazine)  5 mg  PACU PRN  PRN  4/27/20 07:00


 4/28/20 06:59 DC  





 


 Propofol


  (Diprivan)  200 mg  STK-MED ONCE  6/30/20 07:44


 6/30/20 07:44 DC  





 


 Ringer's Solution  1,000 ml @ 


 30 mls/hr  Q24H  4/27/20 07:00


 4/27/20 18:59 DC  





 


 Rocuronium Bromide


  (Zemuron)  100 mg  STK-MED ONCE  6/30/20 07:44


 6/30/20 07:44 DC  





 


 Saliva Substitute


  (Biotene


 Moisturizing


 Mouth)  2 spray  PRN Q15MIN  PRN  5/21/20 11:00


     





 


 Sevoflurane


  (Ultane)  60 ml  STK-MED ONCE  4/27/20 12:26


 4/27/20 12:27 DC  





 


 Sodium


 Bicarbonate 150


 meq/Dextrose  1,150 ml @ 


 75 mls/hr  1X  ONCE  6/30/20 16:30


 7/1/20 07:49 DC 6/30/20 20:02


75 MLS/HR


 


 Sodium


 Bicarbonate 50


 meq/Sodium


 Chloride  1,050 ml @ 


 75 mls/hr  Q14H  3/18/20 07:30


 3/23/20 10:28 DC 3/22/20 21:10


75 MLS/HR


 


 Sodium Acetate 50


 meq/Potassium


 Acetate 55 meq/


 Magnesium Sulfate


 20 meq/Calcium


 Gluconate 10 meq/


 Multivitamins 10


 ml/Chromium/


 Copper/Manganese/


 Seleni/Zn 0.5 ml/


 Insulin Human


 Regular 35 unit/


 Total Parenteral


 Nutrition/Amino


 Acids/Dextrose/


 Fat Emulsion


 Intravenous  1,800 ml @ 


 75 mls/hr  TPN  CONT  4/25/20 22:00


 4/26/20 21:59 DC 4/25/20 22:03


75 MLS/HR


 


 Sodium Bicarbonate


  (Sodium Bicarb


 Adult 8.4% Syr)  100 meq  1X  ONCE  6/30/20 16:30


 6/30/20 16:31 DC 6/30/20 17:07


100 MEQ


 


 Sodium Chloride  1,000 ml @ 


 75 mls/hr  [id number]  8/10/20 18:45


    8/11/20 09:55


75 MLS/HR


 


 Sodium Chloride


  (Normal Saline


 Flush)  3 ml  QSHIFT  PRN  6/30/20 14:45


 8/3/20 09:45 DC  





 


 Sodium Chloride


 80 meq/Potassium


 Chloride 30 meq/


 Potassium Acetate


 30 meq/Magnesium


 Sulfate 14 meq/


 Multivitamins 10


 ml/Chromium/


 Copper/Manganese/


 Seleni/Zn 1 ml/


 Insulin Human


 Regular 15 unit/


 Total Parenteral


 Nutrition/Amino


 Acids/Dextrose/


 Fat Emulsion


 Intravenous  1,920 ml @ 


 80 mls/hr  TPN  CONT  7/1/20 22:00


 7/2/20 21:59 DC 7/1/20 23:05


80 MLS/HR


 


 Sodium Chloride


 90 meq/Calcium


 Gluconate 10 meq/


 Multivitamins 10


 ml/Chromium/


 Copper/Manganese/


 Seleni/Zn 0.5 ml/


 Total Parenteral


 Nutrition/Amino


 Acids/Dextrose/


 Fat Emulsion


 Intravenous  1,512 ml @ 


 63 mls/hr  TPN  CONT  3/18/20 22:00


 3/19/20 21:59 DC 3/18/20 22:06


63 MLS/HR


 


 Sodium Chloride


 90 meq/Calcium


 Gluconate 10 meq/


 Multivitamins 10


 ml/Chromium/


 Copper/Manganese/


 Seleni/Zn 1 ml/


 Total Parenteral


 Nutrition/Amino


 Acids/Dextrose/


 Fat Emulsion


 Intravenous  55.005 ml


  @ 2.292


 mls/hr  TPN  CONT  3/18/20 22:00


 3/18/20 12:33 DC  





 


 Sodium Chloride


 90 meq/Magnesium


 Sulfate 10 meq/


 Calcium Gluconate


 20 meq/


 Multivitamins 10


 ml/Chromium/


 Copper/Manganese/


 Seleni/Zn 0.5 ml/


 Total Parenteral


 Nutrition/Amino


 Acids/Dextrose/


 Fat Emulsion


 Intravenous  1,512 ml @ 


 63 mls/hr  TPN  CONT  3/19/20 22:00


 3/20/20 21:59 DC 3/19/20 22:25


63 MLS/HR


 


 Sodium Chloride


 90 meq/Magnesium


 Sulfate 12 meq/


 Calcium Gluconate


 15 meq/


 Multivitamins 10


 ml/Chromium/


 Copper/Manganese/


 Seleni/Zn 0.5 ml/


 Insulin Human


 Regular 25 unit/


 Total Parenteral


 Nutrition/Amino


 Acids/Dextrose/


 Fat Emulsion


 Intravenous  1,400 ml @ 


 58.333 mls/


 hr  TPN  CONT  4/8/20 22:00


 4/9/20 21:59 DC 4/8/20 21:41


58.333 MLS/HR


 


 Sodium Chloride


 90 meq/Potassium


 Chloride 15 meq/


 Magnesium Sulfate


 12 meq/Calcium


 Gluconate 15 meq/


 Multivitamins 10


 ml/Chromium/


 Copper/Manganese/


 Seleni/Zn 0.5 ml/


 Insulin Human


 Regular 25 unit/


 Total Parenteral


 Nutrition/Amino


 Acids/Dextrose/


 Fat Emulsion


 Intravenous  1,400 ml @ 


 58.333 mls/


 hr  TPN  CONT  4/7/20 22:00


 4/8/20 21:59 DC 4/7/20 22:13


58.333 MLS/HR


 


 Sodium Chloride


 90 meq/Potassium


 Chloride 15 meq/


 Potassium


 Phosphate 10 mmol/


 Magnesium Sulfate


 8 meq/Calcium


 Gluconate 15 meq/


 Multivitamins 10


 ml/Chromium/


 Copper/Manganese/


 Seleni/Zn 0.5 ml/


 Insulin Human


 Regular 25 unit/


 Total Parenteral


 Nutrition/Amino


 Acids/Dextrose/


 Fat Emulsion


 Intravenous  1,400 ml @ 


 58.333 mls/


 hr  TPN  CONT  4/5/20 22:00


 4/6/20 21:59 DC 4/5/20 21:20


58.333 MLS/HR


 


 Sodium Chloride


 90 meq/Potassium


 Chloride 15 meq/


 Potassium


 Phosphate 10 mmol/


 Magnesium Sulfate


 10 meq/Calcium


 Gluconate 20 meq/


 Multivitamins 10


 ml/Chromium/


 Copper/Manganese/


 Seleni/Zn 0.5 ml/


 Total Parenteral


 Nutrition/Amino


 Acids/Dextrose/


 Fat Emulsion


 Intravenous  1,400 ml @ 


 58.333 mls/


 hr  TPN  CONT  3/23/20 22:00


 3/24/20 21:59 DC 3/23/20 21:42


58.333 MLS/HR


 


 Sodium Chloride


 90 meq/Potassium


 Chloride 15 meq/


 Potassium


 Phosphate 10 mmol/


 Magnesium Sulfate


 12 meq/Calcium


 Gluconate 15 meq/


 Multivitamins 10


 ml/Chromium/


 Copper/Manganese/


 Seleni/Zn 0.5 ml/


 Insulin Human


 Regular 25 unit/


 Total Parenteral


 Nutrition/Amino


 Acids/Dextrose/


 Fat Emulsion


 Intravenous  1,400 ml @ 


 58.333 mls/


 hr  TPN  CONT  4/6/20 22:00


 4/7/20 21:59 DC 4/6/20 22:24


58.333 MLS/HR


 


 Sodium Chloride


 90 meq/Potassium


 Chloride 15 meq/


 Potassium


 Phosphate 15 mmol/


 Magnesium Sulfate


 10 meq/Calcium


 Gluconate 15 meq/


 Multivitamins 10


 ml/Chromium/


 Copper/Manganese/


 Seleni/Zn 0.5 ml/


 Total Parenteral


 Nutrition/Amino


 Acids/Dextrose/


 Fat Emulsion


 Intravenous  1,400 ml @ 


 58.333 mls/


 hr  TPN  CONT  3/24/20 22:00


 3/25/20 21:59 DC 3/24/20 22:17


58.333 MLS/HR


 


 Sodium Chloride


 90 meq/Potassium


 Chloride 15 meq/


 Potassium


 Phosphate 15 mmol/


 Magnesium Sulfate


 10 meq/Calcium


 Gluconate 20 meq/


 Multivitamins 10


 ml/Chromium/


 Copper/Manganese/


 Seleni/Zn 0.5 ml/


 Total Parenteral


 Nutrition/Amino


 Acids/Dextrose/


 Fat Emulsion


 Intravenous  1,200 ml @ 


 50 mls/hr  TPN  CONT  3/22/20 22:00


 3/22/20 14:17 DC  





 


 Sodium Chloride


 90 meq/Potassium


 Chloride 15 meq/


 Potassium


 Phosphate 18 mmol/


 Magnesium Sulfate


 8 meq/Calcium


 Gluconate 15 meq/


 Multivitamins 10


 ml/Chromium/


 Copper/Manganese/


 Seleni/Zn 0.5 ml/


 Insulin Human


 Regular 10 unit/


 Total Parenteral


 Nutrition/Amino


 Acids/Dextrose/


 Fat Emulsion


 Intravenous  1,400 ml @ 


 58.333 mls/


 hr  TPN  CONT  3/27/20 22:00


 3/28/20 21:59 DC 3/27/20 21:43


58.333 MLS/HR


 


 Sodium Chloride


 90 meq/Potassium


 Chloride 15 meq/


 Potassium


 Phosphate 18 mmol/


 Magnesium Sulfate


 8 meq/Calcium


 Gluconate 15 meq/


 Multivitamins 10


 ml/Chromium/


 Copper/Manganese/


 Seleni/Zn 0.5 ml/


 Insulin Human


 Regular 15 unit/


 Total Parenteral


 Nutrition/Amino


 Acids/Dextrose/


 Fat Emulsion


 Intravenous  1,400 ml @ 


 58.333 mls/


 hr  TPN  CONT  3/30/20 22:00


 3/31/20 21:59 DC 3/30/20 21:47


58.333 MLS/HR


 


 Sodium Chloride


 90 meq/Potassium


 Chloride 15 meq/


 Potassium


 Phosphate 18 mmol/


 Magnesium Sulfate


 8 meq/Calcium


 Gluconate 15 meq/


 Multivitamins 10


 ml/Chromium/


 Copper/Manganese/


 Seleni/Zn 0.5 ml/


 Insulin Human


 Regular 20 unit/


 Total Parenteral


 Nutrition/Amino


 Acids/Dextrose/


 Fat Emulsion


 Intravenous  1,400 ml @ 


 58.333 mls/


 hr  TPN  CONT  4/2/20 22:00


 4/3/20 21:59 DC 4/2/20 22:45


58.333 MLS/HR


 


 Sodium Chloride


 90 meq/Potassium


 Chloride 15 meq/


 Potassium


 Phosphate 18 mmol/


 Magnesium Sulfate


 8 meq/Calcium


 Gluconate 15 meq/


 Multivitamins 10


 ml/Chromium/


 Copper/Manganese/


 Seleni/Zn 0.5 ml/


 Total Parenteral


 Nutrition/Amino


 Acids/Dextrose/


 Fat Emulsion


 Intravenous  1,400 ml @ 


 58.333 mls/


 hr  TPN  CONT  3/26/20 22:00


 3/27/20 21:59 DC 3/26/20 22:00


58.333 MLS/HR


 


 Sodium Chloride


 90 meq/Potassium


 Chloride 30 meq/


 Potassium Acetate


 30 meq/Magnesium


 Sulfate 15 meq/


 Multivitamins 10


 ml/Chromium/


 Copper/Manganese/


 Seleni/Zn 1 ml/


 Insulin Human


 Regular 15 unit/


 Total Parenteral


 Nutrition/Amino


 Acids/Dextrose/


 Fat Emulsion


 Intravenous  1,680 ml @ 


 70 mls/hr  TPN  CONT  7/16/20 22:00


 7/17/20 21:59 DC 7/16/20 22:06


70 MLS/HR


 


 Sodium Chloride


 90 meq/Potassium


 Phosphate 15 mmol/


 Magnesium Sulfate


 12 meq/Calcium


 Gluconate 15 meq/


 Multivitamins 10


 ml/Chromium/


 Copper/Manganese/


 Seleni/Zn 0.5 ml/


 Insulin Human


 Regular 30 unit/


 Total Parenteral


 Nutrition/Amino


 Acids/Dextrose/


 Fat Emulsion


 Intravenous  1,400 ml @ 


 58.333 mls/


 hr  TPN  CONT  4/10/20 22:00


 4/11/20 21:59 DC 4/10/20 21:49


58.333 MLS/HR


 


 Sodium Chloride


 90 meq/Potassium


 Phosphate 15 mmol/


 Magnesium Sulfate


 12 meq/Calcium


 Gluconate 15 meq/


 Multivitamins 10


 ml/Chromium/


 Copper/Manganese/


 Seleni/Zn 0.5 ml/


 Insulin Human


 Regular 40 unit/


 Total Parenteral


 Nutrition/Amino


 Acids/Dextrose/


 Fat Emulsion


 Intravenous  1,400 ml @ 


 58.333 mls/


 hr  TPN  CONT  4/11/20 22:00


 4/12/20 21:59 DC 4/11/20 21:21


58.333 MLS/HR


 


 Sodium Chloride


 90 meq/Potassium


 Phosphate 19 mmol/


 Magnesium Sulfate


 12 meq/Calcium


 Gluconate 15 meq/


 Multivitamins 10


 ml/Chromium/


 Copper/Manganese/


 Seleni/Zn 0.5 ml/


 Insulin Human


 Regular 40 unit/


 Total Parenteral


 Nutrition/Amino


 Acids/Dextrose/


 Fat Emulsion


 Intravenous  1,400 ml @ 


 58.333 mls/


 hr  TPN  CONT  4/12/20 22:00


 4/13/20 21:59 DC 4/12/20 21:54


58.333 MLS/HR


 


 Sodium Chloride


 90 meq/Potassium


 Phosphate 5 mmol/


 Magnesium Sulfate


 12 meq/Calcium


 Gluconate 15 meq/


 Multivitamins 10


 ml/Chromium/


 Copper/Manganese/


 Seleni/Zn 0.5 ml/


 Insulin Human


 Regular 30 unit/


 Total Parenteral


 Nutrition/Amino


 Acids/Dextrose/


 Fat Emulsion


 Intravenous  1,400 ml @ 


 58.333 mls/


 hr  TPN  CONT  4/9/20 22:00


 4/10/20 21:59 DC 4/9/20 22:08


58.333 MLS/HR


 


 Sodium Chloride


 100 meq/Potassium


 Chloride 30 meq/


 Potassium Acetate


 30 meq/Magnesium


 Sulfate 12 meq/


 Multivitamins 10


 ml/Chromium/


 Copper/Manganese/


 Seleni/Zn 1 ml/


 Insulin Human


 Regular 15 unit/


 Total Parenteral


 Nutrition/Amino


 Acids/Dextrose/


 Fat Emulsion


 Intravenous  1,680 ml @ 


 70 mls/hr  TPN  CONT  7/5/20 22:00


 7/6/20 21:59 DC 7/5/20 21:23


70 MLS/HR


 


 Sodium Chloride


 100 meq/Potassium


 Chloride 40 meq/


 Magnesium Sulfate


 15 meq/Calcium


 Gluconate 15 meq/


 Multivitamins 10


 ml/Chromium/


 Copper/Manganese/


 Seleni/Zn 0.5 ml/


 Insulin Human


 Regular 35 unit/


 Total Parenteral


 Nutrition/Amino


 Acids/Dextrose/


 Fat Emulsion


 Intravenous  1,400 ml @ 


 58.333 mls/


 hr  TPN  CONT  4/19/20 22:00


 4/20/20 21:59 DC 4/19/20 22:46


58.333 MLS/HR


 


 Sodium Chloride


 100 meq/Potassium


 Chloride 40 meq/


 Magnesium Sulfate


 20 meq/Calcium


 Gluconate 10 meq/


 Multivitamins 10


 ml/Chromium/


 Copper/Manganese/


 Seleni/Zn 0.5 ml/


 Insulin Human


 Regular 35 unit/


 Total Parenteral


 Nutrition/Amino


 Acids/Dextrose/


 Fat Emulsion


 Intravenous  1,400 ml @ 


 58.333 mls/


 hr  TPN  CONT  4/23/20 22:00


 4/24/20 21:59 DC 4/24/20 00:06


58.333 MLS/HR


 


 Sodium Chloride


 100 meq/Potassium


 Chloride 40 meq/


 Magnesium Sulfate


 20 meq/Calcium


 Gluconate 15 meq/


 Multivitamins 10


 ml/Chromium/


 Copper/Manganese/


 Seleni/Zn 0.5 ml/


 Insulin Human


 Regular 35 unit/


 Total Parenteral


 Nutrition/Amino


 Acids/Dextrose/


 Fat Emulsion


 Intravenous  1,400 ml @ 


 58.333 mls/


 hr  TPN  CONT  4/22/20 22:00


 4/23/20 21:59 DC 4/22/20 22:27


58.333 MLS/HR


 


 Sodium Chloride


 100 meq/Potassium


 Phosphate 10 mmol/


 Magnesium Sulfate


 12 meq/Calcium


 Gluconate 15 meq/


 Multivitamins 10


 ml/Chromium/


 Copper/Manganese/


 Seleni/Zn 0.5 ml/


 Insulin Human


 Regular 35 unit/


 Potassium


 Chloride 20 meq/


 Total Parenteral


 Nutrition/Amino


 Acids/Dextrose/


 Fat Emulsion


 Intravenous  1,400 ml @ 


 58.333 mls/


 hr  TPN  CONT  4/16/20 22:00


 4/17/20 21:59 DC 4/16/20 22:10


58.333 MLS/HR


 


 Sodium Chloride


 100 meq/Potassium


 Phosphate 19 mmol/


 Magnesium Sulfate


 12 meq/Calcium


 Gluconate 15 meq/


 Multivitamins 10


 ml/Chromium/


 Copper/Manganese/


 Seleni/Zn 0.5 ml/


 Insulin Human


 Regular 40 unit/


 Potassium


 Chloride 20 meq/


 Total Parenteral


 Nutrition/Amino


 Acids/Dextrose/


 Fat Emulsion


 Intravenous  1,400 ml @ 


 58.333 mls/


 hr  TPN  CONT  4/15/20 22:00


 4/16/20 21:59 DC 4/15/20 21:20


58.333 MLS/HR


 


 Sodium Chloride


 100 meq/Potassium


 Phosphate 5 mmol/


 Magnesium Sulfate


 12 meq/Calcium


 Gluconate 15 meq/


 Multivitamins 10


 ml/Chromium/


 Copper/Manganese/


 Seleni/Zn 0.5 ml/


 Insulin Human


 Regular 35 unit/


 Potassium


 Chloride 20 meq/


 Total Parenteral


 Nutrition/Amino


 Acids/Dextrose/


 Fat Emulsion


 Intravenous  1,400 ml @ 


 58.333 mls/


 hr  TPN  CONT  4/17/20 22:00


 4/18/20 21:59 DC 4/17/20 22:59


58.333 MLS/HR


 


 Sodium Chloride


 110 meq/Potassium


 Chloride 30 meq/


 Potassium Acetate


 30 meq/Magnesium


 Sulfate 15 meq/


 Multivitamins 10


 ml/Chromium/


 Copper/Manganese/


 Seleni/Zn 1 ml/


 Insulin Human


 Regular 15 unit/


 Total Parenteral


 Nutrition/Amino


 Acids/Dextrose/


 Fat Emulsion


 Intravenous  1,680 ml @ 


 70 mls/hr  TPN  CONT  7/18/20 22:00


 7/19/20 21:59 DC 7/18/20 22:01


70 MLS/HR


 


 Sodium Chloride


 110 meq/Sodium


 Phosphate 10 mmol/


 Potassium


 Chloride 30 meq/


 Potassium Acetate


 30 meq/Magnesium


 Sulfate 15 meq/


 Multivitamins 10


 ml/Chromium/


 Copper/Manganese/


 Seleni/Zn 1 ml/


 Insulin Human


 Regular 15 unit/


 Total Parenteral


 Nutrition/Amino


 Acids/Dextrose/


 Fat Emulsion


 Intravenous  1,680 ml @ 


 70 mls/hr  TPN  CONT  7/19/20 22:00


 7/20/20 21:59 DC 7/19/20 22:03


70 MLS/HR


 


 Sodium Chloride


 120 meq/Sodium


 Phosphate 10 mmol/


 Potassium


 Chloride 30 meq/


 Potassium Acetate


 30 meq/Magnesium


 Sulfate 15 meq/


 Multivitamins 10


 ml/Chromium/


 Copper/Manganese/


 Seleni/Zn 1 ml/


 Insulin Human


 Regular 15 unit/


 Total Parenteral


 Nutrition/Amino


 Acids/Dextrose/


 Fat Emulsion


 Intravenous  1,680 ml @ 


 70 mls/hr  TPN  CONT  7/26/20 22:00


 7/27/20 21:59 Cancel  





 


 Succinylcholine


 Chloride


  (Anectine)  120 mg  1X  ONCE  3/23/20 08:30


 3/23/20 08:31 DC 3/23/20 08:34


120 MG


 


 Vancomycin HCl


  (Vanco Per


 Pharmacy)  1 each  PRN DAILY  PRN  7/12/20 09:15


 7/15/20 07:41 DC 7/14/20 02:46


1 EACH


 


 Vancomycin HCl


  (Vancomycin


 Random Level)  1 each  1X  ONCE  7/14/20 01:00


 7/14/20 01:01 DC 7/14/20 01:00


1 EACH


 


 Vancomycin HCl


  (Vancomycin


 Trough Level)  1 each  1X  ONCE  7/15/20 09:30


 7/15/20 09:31 Cancel  





 


 Vancomycin HCl


 1.5 gm/Sodium


 Chloride  500 ml @ 


 250 mls/hr  Q12H  7/14/20 10:00


 7/15/20 07:41 DC 7/14/20 22:07


250 MLS/HR


 


 Vancomycin HCl 2


 gm/Sodium Chloride  500 ml @ 


 250 mls/hr  1X  ONCE  7/12/20 10:00


 7/12/20 11:59 DC 7/12/20 10:34


250 MLS/HR


 


 Vasopressin


  (Vasostrict)  20 unit  STK-MED ONCE  6/30/20 12:23


 6/30/20 12:23 DC  





 


 Vasopressin 20


 unit/Dextrose  101 ml @ 


 12 mls/hr  CONT  PRN  6/30/20 15:30


 8/3/20 09:45 DC 7/7/20 04:17


12 MLS/HR


 


 Vecuronium Bromide


  (Norcuron Bolus)  6 mg  PRN Q6HRS  PRN  5/7/20 19:15


 5/7/20 19:35 DC  





 


 Vitamin A/Vitamin


 D


  (Vitamin A & D


 Ointment)  1 thomas  PRN Q1HR  PRN  8/4/20 09:15


    8/8/20 05:26


1 THOMAS


 


 Zoledronic Acid  100 ml @ 


 400 mls/hr  1X  ONCE  8/7/20 12:00


 8/7/20 12:14 DC 8/7/20 13:04


400 MLS/HR











Labs:


Lab





Laboratory Tests








Test


 8/10/20


12:27 8/10/20


18:38 8/11/20


00:25 8/11/20


05:30


 


Glucose (Fingerstick)


 171 mg/dL


(70-99) 210 mg/dL


(70-99) 185 mg/dL


(70-99) 





 


Sodium Level


 


 


 


 147 mmol/L


(136-145)


 


Potassium Level


 


 


 


 5.9 mmol/L


(3.5-5.1)


 


Chloride Level


 


 


 


 113 mmol/L


()


 


Carbon Dioxide Level


 


 


 


 25 mmol/L


(21-32)


 


Anion Gap    9 (6-14) 


 


Blood Urea Nitrogen


 


 


 


 61 mg/dL


(7-20)


 


Creatinine


 


 


 


 1.8 mg/dL


(0.6-1.0)


 


Estimated GFR


(Cockcroft-Gault) 


 


 


 29.9 





 


Glucose Level


 


 


 


 209 mg/dL


(70-99)


 


Calcium Level


 


 


 


 8.2 mg/dL


(8.5-10.1)


 


Test


 8/11/20


05:34 


 


 





 


Glucose (Fingerstick)


 190 mg/dL


(70-99) 


 


 











Micro


        NEG ANSON 56


        PSEUDOMONAS AERUGINOSA


        ANTIBIOTIC                        RESULT          INTERPRETATION


        AMIKACIN                          <=16                  S


        AZTREONAM                         >16                   R


        CEFTAZIDIME                       >16                   R


        CIPROFLOXACIN                     <=0.25                S


        CEFEPIME                          16                    I


        GENTAMICIN                        <=2                   S


        LEVOFLOXACIN                      <=0.5                 S














                               ** CONTINUED ON NEXT PAGE **





 

--------------------------------------------------------------------------------


------------





RUN DATE: 06/10/20                  [facility] *LIVE*               

  PAGE 2   


RUN TIME: 1121                            Specimen Inquiry                    


 

--------------------------------------------------------------------------------


------------





SPEC: [id number]    PATIENT: JESENIA BEAN                [id number]  

(Continued)


--------------------------------------

------------------------------------------------------








-----------------------------------------------------------------------

---------------------





  Procedure                         Result                                      

         


--------------

------------------------------------------------------------------------------





  ANTIMICROBIAL SUSCEPTIBILITY  Preliminary   (continued)


        MEROPENEM                         <=1                   S


        PIPERACILLIN/TAZOBACTAM           64                    S


        TOBRAMYCIN                        <=2                   S


        Unless otherwise specified, Testing Performed by:


        [address] [address]


        For Inquires, the Physician may contact the Microbiology


        department at [phone]





---------------------------------------------------

-----------------------------------------





Objective:


Assessment:


Patient with prolonged hospitalization more than 4 months


Multiple medical problems


Multiple surgical procedures





  


Intermittent fevers


S/P Exp. 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 

parapsilosis & PSAE (I-merrem/ceftazidime/AZT/cefepime))


Leukocytosis - better


Loose stool but on tube feed - WBC down and no gross fever


Fever - 7/25 c-diff neg


Anemia


Acute gallstone pancreatitis with persistent necrosis


  - 4/9.  CT A/P Increased ascites. Persistent evidence of necrotizing 

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


  - 4/27. status post ROBERT drain placement; C. parapsilosis. s/p drain 5/6 + yeast

& high amylase; s/p additional drain on 5/8. Drains removed. 


  -5/6. fluid  candida parapsilosis fluid, amylase high


  - 6/6 showed multiple pseudocysts, slight larger on the right. s/p drains x 3,

6/7.  + PSAE (MDRO-R Cefepime, Zosyn ANSON < 64) and yeast, 


  -6/7 s/p drain replacement x 3; fluid cult PSAE (MDRO), yeast; treated


  -7/12 CT A/P shows smaller fluid collections.  


  -722 CT abdomen and pelvis drains in place       


Ascites s/p paracentesis 4/15 & 5/6. C. parapsilosis 


Cholelithiasis with thickening of the gallbladder wall.


JED, Hyperkalemia, Metabolic acidosis off dialysis


Acute hypoxic resp failure. trach/vent. sputum 6/13  + PSAE (I merrem) ; sputum 

culture July 19+ for PSAE R Merrem, sensitive to cefepime


Pleural effusion status post CTS left side


 Abdominal fluid culture 6 /7 MDRO Pseudomonas, yeast


Sputum culture positive 7/19 for MDRO Pseudomonas


Chest tube fluid positive for 7/21 Candida Parapsilosis


EC fistula


Severe PCM


Critical illness myopathy


Gen debility


Last urine culture Candida parapsilosis





Plan:


Plan of Care





Continue cefepime and flagyl 810


BC negative so far


Follow-up urine c/s


Change [place]


Nystatin to groin


Right upper extremity PICC line placed


C. difficile negative


Monitor WBC/temp 


Wound care /drain management as directed


Contact isolation for CRE/MDRO








Long term prognosis  poor





D/w nursing











IVAN FRANZ MD           Aug 11, 2020 10:26

## 2020-08-11 NOTE — PDOC
Date of Service:


DATE: 8/11/20 


TIME: 10:00





Objective:


Objective:


No new GI concerns per nurse.


Vital Signs:





                                   Vital Signs








  Date Time  Temp Pulse Resp B/P (MAP) Pulse Ox O2 Delivery O2 Flow Rate FiO2


 


8/11/20 08:14     100 Nasal Cannula 2.0 


 


8/11/20 07:00 97.3 145 40 108/69 (82)    





 97.3       








Labs:





Laboratory Tests








Test


 8/10/20


12:27 8/10/20


18:38 8/11/20


00:25 8/11/20


05:30


 


Glucose (Fingerstick) 171 mg/dL  210 mg/dL  185 mg/dL  


 


Sodium Level    147 mmol/L 


 


Potassium Level    5.9 mmol/L 


 


Chloride Level    113 mmol/L 


 


Carbon Dioxide Level    25 mmol/L 


 


Anion Gap    9 


 


Blood Urea Nitrogen    61 mg/dL 


 


Creatinine    1.8 mg/dL 


 


Estimated GFR


(Cockcroft-Gault) 


 


 


 29.9 





 


Glucose Level    209 mg/dL 


 


Calcium Level    8.2 mg/dL 


 


Test


 8/11/20


05:34 


 


 





 


Glucose (Fingerstick) 190 mg/dL    








  BLOOD CULTURE  Preliminary  


        NO GROWTH AFTER 1 DAY





PE:





GEN: chronically ill


HEART: tachycardic


LUNGS: tachypneic on NC


NEURO/PSYCH: sleeping, not awakened





A/P:


S/p pancreatic necrosectomy





--


Continue support.





Justicifation of Admission Dx:


Justifications for Admission:


Justification of Admission Dx:  Yes











RAGHAVENDRA MURCIA         Aug 11, 2020 10:03

## 2020-08-11 NOTE — PDOC
PROGRESS NOTES


Date of Service:


DATE: 8/11/20 


TIME: 12:30





Chief Complaint


Chief Complaint


S/P Exp. 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 

parapsilosis & PSAE 07/26 (I-merrem/ceftazidime/AZT/cefepime))


Leucocytosis 


Fevers, intermittent


Acute gallstone pancreatitis with persistent necrosis


Acute hypoxic Respiratory failure required mechanical ventilation


Tracheostomy 


Bilateral pleural effusions/pulm edema s/p Throacentesis on 6/15/2020


HTN 


Intractable pain


Intractable nausea


Covid 19 negative


Acute on chronic anemia 


Abd distention - U/S and CT reviewed s/p 0.4 L of opaque, debris-containing 

ascites was removed 5/6


Gallstone pancreatitis with necrosis. 


   -CT A/P 6/6 showed multiple pseudocysts, slight larger on the right. s/p 

drains x 3, 6/7.  + PSAE (MDRO-R Cefepime, Zosyn ANSON < 64) and yeast, 


   -s/p drain 4/27. C. parapsilosis. s/p drain 5/6 + yeast & high amylase; s/p 

additional drain on 5/8. Drains removed. 


Ascites s/p paracentesis 4/15 & 5/6. C. parapsilosis 


JED. off HD. 


A large fluid collection in the pancreatic bed has slightly decreased in size, 

described below, the pancreas itself is difficult to  visualize, which could be 

due to necrosis or obscuration of pancreatic  parenchyma from the surrounding 

fluid collection.6/15 


- 4/27 status post ROBERT drain placement + C paropsilosis. s/p additional drains 

5/8


Hypocalcemia 


Prediabetes


s/p trach


Hyperglycemia


Severe protein-caloric malnutrition


Extensive retroperitoneal fluid collections persist. Percutaneous  drains remain

within the collections in both paracolic gutters. These  communicate with 

additional pelvic and peripancreatic collections.





History of Present Illness


History of Present Illness


8/11.   dyspnea and mild distress worsened this AM.  but was able to walk some 

with PT later.


still  sometimes,    








08/10/2020


Patient seen and examined


Afebrile


Resting in NAD


Tachycardic and tachypneic overnight


Recurring leukocytosis, 23.0


Chart reviewed


Discussed with RN





08/9: Afebrile.  Calcium down to 10.1.  Little more tachycardic today with some 

more secretions. No O2 requirements. Does not wish to wear her speaking valve. 

Will check CXR.


8/8: Afebrile, weak, having more secretions not wearing a speaking valve today. 

WBC 10, Hb 8.8, , and K3.7, BUN 12, CR 0.5, calcium down to 10.3.  After 

zoledronic acid


8/7: Had a rash after calcitonin injection.  Discussed with nephrology with her 

calcium moving from 11.3-10.9 will give IV bisphosphonate today, zoledronic 

acid.


8/6: Hb stable. Afebrile. Weak. Calcium increased. Shortness of breath is 

improving. Plan for calcitonin today, lasix, and saline


8/5: Hemoglobin abnormally low on repeat has been stable 7.2.  Calcium up to 

10.9.  She is able to work with PT, she is asking to eat.  Social work is been 

having difficulties with both of her daughters completing the financial aspect 

of disability paperwork which is been prolonging her stay over the past 5 

months.  Plan to check PTH and to treat hypercalcemia with 1 L normal saline 40 

mg of IV Lasix and repeat labs in a.m.


8/4: Not wishing to wear her speaking valve. J tube having some difficulties. 

Afebrile. Rolf bedside to aid her. transferred from ICU today


8/3: No overnight events. Calcium 10.7 on AM labs. She is still a bit slow to to

respond, but follows commands well. Does not want speaking valve with me. Pain 

is better controlled in her abdomen. Wound care to see today. Will check liver 

function and phos levels given her calcium elevation.


8/2: Patient seen and examined bedside.  Positive fluid balance in the past 24 

hours.  Patient's chart, labs, images were reviewed and discussed with RN


8/1: No acute events overnight.  Seen and examined at bedside.  Patient had a 

fever 100.2.  Negative fluid balance of 150 cc.  Patient's chart, labs, images 

were reviewed and discussed with RN


7/31: Patient seen and examined at bedside.  No acute events overnight.  

Positive fluid balance 633.  Patient's chart, labs, images were reviewed and 

discussed with RN


7/30: Patient seen and examined bedside.  No acute events overnight.  Patient's 

chart, labs, images were reviewed and discussed with RN


7/28: Patient seen and examined bedside.  Patient appears to be in no obvious 

pain.  All drains have been adequately draining.  Patient continues to be on 

TPN.  Chart labs and imaging was reviewed.  Negative fluid balance of 270 cc


7/27:Patient seen and examined in the ICU.  Patient still requires extensive 

care.  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  Chart, labs, imaging was

reviewed.  UOP with + 500cc balance.


7/25: Patient seen in and examined in the ICU. She is still extremely critically

ill. Appears weak, frail, and pale. Better color today with improved eye contact

and expression. Discussed with RN. Chart reviewed


7/21: Patient seen and examined in the ICU, She is still extremely critically 

ill, Appears extremely weak frail and pale, Discussed with RN, Chart reviewed





Vitals


Vitals





Vital Signs








  Date Time  Temp Pulse Resp B/P (MAP) Pulse Ox O2 Delivery O2 Flow Rate FiO2


 


8/11/20 11:00 99.3 130 40 108/57 (74) 99 Nasal Cannula 3.0 





 99.3       











Physical Exam


Physical Exam


GENERAL: Resting in bed, NAD


HEENT: Pupils equal, oral cavity dry. OC/Op - Dry


NECK:  Tracheostomy - no JVD


LUNGS: Diminished aeration bases,


HEART:  S1, S2, 


ABDOMEN: Less distended, mild- bowel sounds hypoactive, soft, GJ drain present, 

drainage bag in place with depedent drainage


: [place] in place 


EXTREMITIES: Trace generalized edema, no cyanosis. Yeast in groin area


SKIN: warm touch. No signs of rash.  


NEURO: - Alert and responsive


RUE  PICC site without signs of complications. PIV s clean


EC fistula


General:  Cooperative


Heart:  Normal S1, Normal S2, Other


Lungs:  Clear


Abdomen:  Soft


Extremities:  No clubbing, No cyanosis, Other (Diffuse edema)


Skin:  No breakdown





Labs


LABS





Laboratory Tests








Test


 8/10/20


18:38 8/11/20


00:25 8/11/20


05:30 8/11/20


05:34


 


Glucose (Fingerstick)


 210 mg/dL


(70-99) 185 mg/dL


(70-99) 


 190 mg/dL


(70-99)


 


Sodium Level


 


 


 147 mmol/L


(136-145) 





 


Potassium Level


 


 


 5.9 mmol/L


(3.5-5.1) 





 


Chloride Level


 


 


 113 mmol/L


() 





 


Carbon Dioxide Level


 


 


 25 mmol/L


(21-32) 





 


Anion Gap   9 (6-14)  


 


Blood Urea Nitrogen


 


 


 61 mg/dL


(7-20) 





 


Creatinine


 


 


 1.8 mg/dL


(0.6-1.0) 





 


Estimated GFR


(Cockcroft-Gault) 


 


 29.9 


 





 


Glucose Level


 


 


 209 mg/dL


(70-99) 





 


Calcium Level


 


 


 8.2 mg/dL


(8.5-10.1) 





 


Test


 8/11/20


12:20 


 


 





 


Glucose (Fingerstick)


 172 mg/dL


(70-99) 


 


 














Assessment and Plan


Assessmemt and Plan


Problems


Medical Problems:


(1) Acute pancreatitis


Status: Acute  





(2) Cholelithiasis


Status: Acute  











Comment


Review of Relevant


I have reviewed the following items josy (where applicable) has been applied.


Labs





Laboratory Tests








Test


 8/9/20


17:17 8/9/20


17:51 8/9/20


19:50 8/9/20


23:01


 


Glucose (Fingerstick)


 164 mg/dL


(70-99) 


 


 166 mg/dL


(70-99)


 


Phosphorus Level


 


 2.7 mg/dL


(2.6-4.7) 


 





 


Lactic Acid Level


 


 


 < 0.3 mmol/L


(0.4-2.0) 





 


Test


 8/10/20


03:00 8/10/20


09:45 8/10/20


12:27 8/10/20


18:38


 


White Blood Count


 23.1 x10^3/uL


(4.0-11.0) 


 


 





 


Red Blood Count


 3.29 x10^6/uL


(3.50-5.40) 


 


 





 


Hemoglobin


 9.3 g/dL


(12.0-15.5) 


 


 





 


Hematocrit


 29.2 %


(36.0-47.0) 


 


 





 


Mean Corpuscular Volume 89 fL ()    


 


Mean Corpuscular Hemoglobin 28 pg (25-35)    


 


Mean Corpuscular Hemoglobin


Concent 32 g/dL


(31-37) 


 


 





 


Red Cell Distribution Width


 18.9 %


(11.5-14.5) 


 


 





 


Platelet Count


 620 x10^3/uL


(140-400) 


 


 





 


Neutrophils (%) (Auto) 82 % (31-73)    


 


Lymphocytes (%) (Auto) 12 % (24-48)    


 


Monocytes (%) (Auto) 6 % (0-9)    


 


Eosinophils (%) (Auto) 0 % (0-3)    


 


Basophils (%) (Auto) 0 % (0-3)    


 


Neutrophils # (Auto)


 18.9 x10^3/uL


(1.8-7.7) 


 


 





 


Lymphocytes # (Auto)


 2.9 x10^3/uL


(1.0-4.8) 


 


 





 


Monocytes # (Auto)


 1.3 x10^3/uL


(0.0-1.1) 


 


 





 


Eosinophils # (Auto)


 0.0 x10^3/uL


(0.0-0.7) 


 


 





 


Basophils # (Auto)


 0.1 x10^3/uL


(0.0-0.2) 


 


 





 


Sodium Level


 145 mmol/L


(136-145) 


 


 





 


Potassium Level


 5.6 mmol/L


(3.5-5.1) 


 


 





 


Chloride Level


 111 mmol/L


() 


 


 





 


Carbon Dioxide Level


 26 mmol/L


(21-32) 


 


 





 


Anion Gap 8 (6-14)    


 


Blood Urea Nitrogen


 35 mg/dL


(7-20) 


 


 





 


Creatinine


 1.2 mg/dL


(0.6-1.0) 


 


 





 


Estimated GFR


(Cockcroft-Gault) 47.7 


 


 


 





 


Glucose Level


 138 mg/dL


(70-99) 


 


 





 


Calcium Level


 9.1 mg/dL


(8.5-10.1) 


 


 





 


Urine Collection Type  Unknown   


 


Urine Color  Jolene   


 


Urine Clarity  Turbid   


 


Urine pH  5.0 (<5.0-8.0)   


 


Urine Specific Gravity


 


 >=1.030


(1.000-1.030) 


 





 


Urine Protein


 


 100 mg/dL


(NEG-TRACE) 


 





 


Urine Glucose (UA)


 


 Negative mg/dL


(NEG) 


 





 


Urine Ketones (Stick)


 


 Trace mg/dL


(NEG) 


 





 


Urine Blood  Large (NEG)   


 


Urine Nitrite  Negative (NEG)   


 


Urine Bilirubin  Negative (NEG)   


 


Urine Urobilinogen Dipstick


 


 0.2 mg/dL (0.2


mg/dL) 


 





 


Urine Leukocyte Esterase  Large (NEG)   


 


Urine RBC


 


 Field obscured


/HPF (0-2) 


 





 


Urine WBC


 


 Tntc /HPF


(0-4) 


 





 


Urine Bacteria


 


 Many /HPF


(0-FEW) 


 





 


Urine Yeast  Present /HPF   


 


Glucose (Fingerstick)


 


 


 171 mg/dL


(70-99) 210 mg/dL


(70-99)


 


Test


 8/11/20


00:25 8/11/20


05:30 8/11/20


05:34 8/11/20


12:20


 


Glucose (Fingerstick)


 185 mg/dL


(70-99) 


 190 mg/dL


(70-99) 172 mg/dL


(70-99)


 


Sodium Level


 


 147 mmol/L


(136-145) 


 





 


Potassium Level


 


 5.9 mmol/L


(3.5-5.1) 


 





 


Chloride Level


 


 113 mmol/L


() 


 





 


Carbon Dioxide Level


 


 25 mmol/L


(21-32) 


 





 


Anion Gap  9 (6-14)   


 


Blood Urea Nitrogen


 


 61 mg/dL


(7-20) 


 





 


Creatinine


 


 1.8 mg/dL


(0.6-1.0) 


 





 


Estimated GFR


(Cockcroft-Gault) 


 29.9 


 


 





 


Glucose Level


 


 209 mg/dL


(70-99) 


 





 


Calcium Level


 


 8.2 mg/dL


(8.5-10.1) 


 











Laboratory Tests








Test


 8/10/20


18:38 8/11/20


00:25 8/11/20


05:30 8/11/20


05:34


 


Glucose (Fingerstick)


 210 mg/dL


(70-99) 185 mg/dL


(70-99) 


 190 mg/dL


(70-99)


 


Sodium Level


 


 


 147 mmol/L


(136-145) 





 


Potassium Level


 


 


 5.9 mmol/L


(3.5-5.1) 





 


Chloride Level


 


 


 113 mmol/L


() 





 


Carbon Dioxide Level


 


 


 25 mmol/L


(21-32) 





 


Anion Gap   9 (6-14)  


 


Blood Urea Nitrogen


 


 


 61 mg/dL


(7-20) 





 


Creatinine


 


 


 1.8 mg/dL


(0.6-1.0) 





 


Estimated GFR


(Cockcroft-Gault) 


 


 29.9 


 





 


Glucose Level


 


 


 209 mg/dL


(70-99) 





 


Calcium Level


 


 


 8.2 mg/dL


(8.5-10.1) 





 


Test


 8/11/20


12:20 


 


 





 


Glucose (Fingerstick)


 172 mg/dL


(70-99) 


 


 











Microbiology


8/10/20 Blood Culture - Preliminary, Resulted


          NO GROWTH AFTER 1 DAY


7/26/20 Gram Stain - Final, Complete


          


7/26/20 Aerobic Culture - Final, Complete


          


7/26/20 Urine Culture - Final, Complete


          


7/21/20 Gram Stain - Final, Complete


          


7/21/20 Aerobic and Anaerobic Culture - Final, Complete


          


7/19/20 Gram Stain Evaluation - Final, Complete


          


7/19/20 Respiratory Culture - Final, Complete


          


7/19/20 Antimicrobic Susceptibility - Final, Complete


          


6/30/20 Gram Stain - Final, Complete


          


6/30/20 Aerobic and Anaerobic Culture - Final, Complete


          


6/30/20 Antimicrobic Susceptibility - Final, Complete


Medications





Current Medications


Sodium Chloride 1,000 ml @  1,000 mls/hr Q1H IV  Last administered on 3/16/20at 

03:00;  Start 3/16/20 at 03:00;  Stop 3/16/20 at 03:59;  Status DC


Ondansetron HCl (Zofran) 4 mg 1X  ONCE IVP  Last administered on 3/16/20at 

03:27;  Start 3/16/20 at 03:00;  Stop 3/16/20 at 03:01;  Status DC


Morphine Sulfate (Morphine Sulfate) 4 mg 1X  ONCE IV ;  Start 3/16/20 at 03:00; 

Stop 3/16/20 at 03:01;  Status Cancel


Ketorolac Tromethamine (Toradol 30mg Vial) 30 mg 1X  ONCE IV  Last administered 

on 3/16/20at 02:54;  Start 3/16/20 at 03:00;  Stop 3/16/20 at 03:01;  Status DC


Fentanyl Citrate (Fentanyl 2ml Vial) 25 mcg 1X  ONCE IVP  Last administered on 

3/16/20at 03:23;  Start 3/16/20 at 03:30;  Stop 3/16/20 at 03:31;  Status DC


Fentanyl Citrate (Fentanyl 2ml Vial) 100 mcg STK-MED ONCE .ROUTE ;  Start 

3/16/20 at 03:18;  Stop 3/16/20 at 03:18;  Status DC


Iohexol (Omnipaque 350 Mg/ml) 90 ml 1X  ONCE IV  Last administered on 3/16/20at 

03:25;  Start 3/16/20 at 03:30;  Stop 3/16/20 at 03:31;  Status DC


Info (CONTRAST GIVEN -- Rx MONITORING) 1 each PRN DAILY  PRN MC SEE COMMENTS;  

Start 3/16/20 at 03:30;  Stop 3/18/20 at 03:29;  Status DC


Hydromorphone HCl (Dilaudid) 0.5 mg 1X  ONCE IV  Last administered on 3/16/20at 

03:55;  Start 3/16/20 at 04:30;  Stop 3/16/20 at 04:32;  Status DC


Ondansetron HCl (Zofran) 4 mg PRN Q8HRS  PRN IV NAUSEA/VOMITING 1ST CHOICE;  

Start 3/16/20 at 05:00;  Stop 3/16/20 at 09:27;  Status DC


Morphine Sulfate (Morphine Sulfate) 2 mg PRN Q2HR  PRN IV SEVERE PAIN 7-10 Last 

administered on 3/17/20at 12:26;  Start 3/16/20 at 05:00;  Stop 3/17/20 at 14:

15;  Status DC


Sodium Chloride 1,000 ml @  125 mls/hr Q8H IV  Last administered on 3/16/20at 

20:56;  Start 3/16/20 at 05:00;  Stop 3/17/20 at 04:59;  Status DC


Hydromorphone HCl (Dilaudid) 0.5 mg PRN Q3HRS  PRN IV SEVERE PAIN 7-10 Last 

administered on 3/17/20at 10:06;  Start 3/16/20 at 05:00;  Stop 3/17/20 at 

12:01;  Status DC


Piperacillin Sod/ Tazobactam Sod 4.5 gm/Sodium Chloride 100 ml @  200 mls/hr 1X 

ONCE IV  Last administered on 3/16/20at 05:44;  Start 3/16/20 at 06:00;  Stop 

3/16/20 at 06:29;  Status DC


Ondansetron HCl (Zofran) 4 mg PRN Q4HRS  PRN IV NAUSEA/VOMITING 1ST CHOICE Last 

administered on 8/9/20at 23:15;  Start 3/16/20 at 09:30


Insulin Human Lispro (HumaLOG) 0-9 UNITS Q6HRS SQ  Last administered on 

8/10/20at 19:01;  Start 3/16/20 at 09:30


Dextrose (Dextrose 50%-Water Syringe) 12.5 gm PRN Q15MIN  PRN IV SEE COMMENTS;  

Start 3/16/20 at 09:30


Pantoprazole Sodium (PROTONIX VIAL for IV PUSH) 40 mg DAILYAC IVP  Last 

administered on 8/11/20at 09:42;  Start 3/16/20 at 11:30


Prochlorperazine Edisylate (Compazine) 10 mg PRN Q6HRS  PRN IV NAUSEA/VOMITING, 

2nd CHOICE Last administered on 8/10/20at 00:42;  Start 3/16/20 at 17:45


Atenolol (Tenormin) 100 mg DAILY PO ;  Start 3/17/20 at 09:00;  Stop 3/16/20 at 

20:08;  Status DC


Metoprolol Tartrate (Lopressor Vial) 2.5 mg Q6HRS IVP  Last administered on 

3/17/20at 05:51;  Start 3/16/20 at 20:15;  Stop 3/17/20 at 10:02;  Status DC


Metoprolol Tartrate (Lopressor Vial) 5 mg Q6HRS IVP  Last administered on 

3/26/20at 00:12;  Start 3/17/20 at 10:15;  Stop 3/28/20 at 08:48;  Status DC


Hydromorphone HCl (Dilaudid) 1 mg PRN Q3HRS  PRN IV SEVERE PAIN 7-10 Last 

administered on 3/23/20at 05:13;  Start 3/17/20 at 12:00;  Stop 3/31/20 at 

00:25;  Status DC


Lidocaine HCl (Buffered Lidocaine 1%) 3 ml STK-MED ONCE .ROUTE ;  Start 3/17/20 

at 12:55;  Stop 3/17/20 at 12:56;  Status DC


Albumin Human 500 ml @  125 mls/hr 1X  ONCE IV  Last administered on 3/17/20at 

14:33;  Start 3/17/20 at 14:30;  Stop 3/17/20 at 18:32;  Status DC


Norepinephrine Bitartrate 8 mg/ Dextrose 258 ml @  17.299 mls/ hr CONT  PRN IV 

PER PROTOCOL Last administered on 4/14/20at 12:48;  Start 3/17/20 at 15:30;  

Stop 4/17/20 at 09:19;  Status DC


Sodium Chloride 1,000 ml @  125 mls/hr Q8H IV  Last administered on 3/17/20at 

21:04;  Start 3/17/20 at 16:00;  Stop 3/18/20 at 02:42;  Status DC


Albumin Human 500 ml @  125 mls/hr PRN BID  PRN IV After every 2L NSS & BP < 

90mm Last administered on 6/30/20at 16:06;  Start 3/17/20 at 16:00;  Stop 7/3/20

at 09:30;  Status DC


Iohexol (Omnipaque 300 Mg/ml) 60 ml 1X  ONCE IV  Last administered on 3/17/20at 

17:20;  Start 3/17/20 at 17:00;  Stop 3/17/20 at 17:01;  Status DC


Info (CONTRAST GIVEN -- Rx MONITORING) 1 each PRN DAILY  PRN MC SEE COMMENTS;  

Start 3/17/20 at 17:00;  Stop 3/19/20 at 16:59;  Status DC


Meropenem 1 gm/ Sodium Chloride 100 ml @  200 mls/hr Q8HRS IV  Last administered

on 3/18/20at 05:45;  Start 3/17/20 at 20:00;  Stop 3/18/20 at 08:48;  Status DC


Furosemide (Lasix) 40 mg 1X  ONCE IVP  Last administered on 3/17/20at 22:12;  

Start 3/17/20 at 22:30;  Stop 3/17/20 at 22:31;  Status DC


Calcium Chloride 1000 mg/Sodium Chloride 110 ml @  220 mls/hr 1X  ONCE IV  Last 

administered on 3/17/20at 22:11;  Start 3/17/20 at 22:30;  Stop 3/17/20 at 

22:59;  Status DC


Albuterol Sulfate (Ventolin Neb Soln) 2.5 mg 1X  ONCE NEB  Last administered on 

3/18/20at 00:56;  Start 3/17/20 at 22:30;  Stop 3/17/20 at 22:31;  Status DC


Insulin Human Regular (HumuLIN R VIAL) 5 unit 1X  ONCE IV  Last administered on 

3/17/20at 22:14;  Start 3/17/20 at 22:30;  Stop 3/17/20 at 22:31;  Status DC


Magnesium Sulfate 50 ml @ 25 mls/hr 1X  ONCE IV  Last administered on 3/18/20at 

02:57;  Start 3/18/20 at 03:00;  Stop 3/18/20 at 04:59;  Status DC


Calcium Gluconate 1000 mg/Sodium Chloride 110 ml @  220 mls/hr 1X  ONCE IV  Last

administered on 3/18/20at 02:46;  Start 3/18/20 at 03:00;  Stop 3/18/20 at 

03:29;  Status DC


Sodium Chloride 1,000 ml @  200 mls/hr Q5H IV  Last administered on 3/18/20at 

02:46;  Start 3/18/20 at 03:00;  Stop 3/18/20 at 10:21;  Status DC


Calcium Gluconate 1000 mg/Sodium Chloride 110 ml @  220 mls/hr 1X  ONCE IV  Last

administered on 3/18/20at 03:21;  Start 3/18/20 at 03:30;  Stop 3/18/20 at 

03:59;  Status DC


Sodium Bicarbonate 50 meq/Sodium Chloride 1,050 ml @  75 mls/hr Q14H IV  Last 

administered on 3/22/20at 21:10;  Start 3/18/20 at 07:30;  Stop 3/23/20 at 

10:28;  Status DC


Calcium Gluconate 2000 mg/Sodium Chloride 120 ml @  220 mls/hr 1X  ONCE IV  Last

administered on 3/18/20at 09:05;  Start 3/18/20 at 07:30;  Stop 3/18/20 at 

08:02;  Status DC


Lidocaine HCl (Xylocaine-Mpf 1% 2ml Vial) 2 ml STK-MED ONCE .ROUTE ;  Start 

3/18/20 at 08:47;  Stop 3/18/20 at 08:47;  Status DC


Meropenem 500 mg/ Sodium Chloride 50 ml @  100 mls/hr Q12HR IV  Last 

administered on 3/23/20at 21:01;  Start 3/18/20 at 18:00;  Stop 3/24/20 at 

07:58;  Status DC


Lidocaine HCl (Buffered Lidocaine 1%) 3 ml STK-MED ONCE .ROUTE ;  Start 3/18/20 

at 09:46;  Stop 3/18/20 at 09:46;  Status DC


Lidocaine HCl (Buffered Lidocaine 1%) 6 ml 1X  ONCE INJ  Last administered on 

3/18/20at 10:26;  Start 3/18/20 at 10:15;  Stop 3/18/20 at 10:16;  Status DC


Info (Tpn Per Pharmacy) 1 each PRN DAILY  PRN MC SEE COMMENTS Last administered 

on 7/26/20at 08:31;  Start 3/18/20 at 12:00;  Stop 7/26/20 at 10:41;  Status DC


Sodium Chloride 1,000 ml @  1,000 mls/hr Q1H PRN IV hypotension;  Start 3/18/20 

at 12:07;  Stop 3/18/20 at 18:06;  Status DC


Diphenhydramine HCl (Benadryl) 25 mg 1X PRN  PRN IV ITCHING;  Start 3/18/20 at 

12:15;  Stop 3/19/20 at 12:14;  Status DC


Diphenhydramine HCl (Benadryl) 25 mg 1X PRN  PRN IV ITCHING;  Start 3/18/20 at 

12:15;  Stop 3/19/20 at 12:14;  Status DC


Sodium Chloride 1,000 ml @  400 mls/hr Q2H30M PRN IV PATENCY;  Start 3/18/20 at 

12:07;  Stop 3/19/20 at 00:06;  Status DC


Info (PHARMACY MONITORING -- do not chart) 1 each PRN DAILY  PRN MC SEE 

COMMENTS;  Start 3/18/20 at 12:15;  Stop 3/20/20 at 08:13;  Status DC


Sodium Chloride 90 meq/Calcium Gluconate 10 meq/ Multivitamins 10 ml/Chromium/ 

Copper/Manganese/ Seleni/Zn 1 ml/ Total Parenteral Nutrition/Amino 

Acids/Dextrose/ Fat Emulsion Intravenous 55.005 ml  @ 2.292 mls/hr TPN  CONT IV 

;  Start 3/18/20 at 22:00;  Stop 3/18/20 at 12:33;  Status DC


Info (Tpn Per Pharmacy) 1 each PRN DAILY  PRN MC SEE COMMENTS;  Start 3/18/20 at

12:30;  Status UNV


Sodium Chloride 90 meq/Calcium Gluconate 10 meq/ Multivitamins 10 ml/Chromium/ 

Copper/Manganese/ Seleni/Zn 0.5 ml/ Total Parenteral Nutrition/Amino 

Acids/Dextrose/ Fat Emulsion Intravenous 1,512 ml @  63 mls/hr TPN  CONT IV  

Last administered on 3/18/20at 22:06;  Start 3/18/20 at 22:00;  Stop 3/19/20 at 

21:59;  Status DC


Calcium Carbonate/ Glycine (Tums) 500 mg PRN AFTMEALHC  PRN PO INDIGESTION;  

Start 3/18/20 at 17:45;  Stop 5/13/20 at 10:25;  Status DC


Calcium Gluconate (Calcium Gluconate) 2,000 mg 1X  ONCE IVP  Last administered 

on 3/19/20at 02:19;  Start 3/19/20 at 02:15;  Stop 3/19/20 at 02:16;  Status DC


Calcium Chloride 3000 mg/Sodium Chloride 1,030 ml @  50 mls/hr [id number] IV  Last 

administered on 3/21/20at 02:17;  Start 3/19/20 at 08:00;  Stop 3/21/20 at 

15:23;  Status DC


Lorazepam (Ativan Inj) 1 mg PRN Q4HRS  PRN IVP ANXIETY / AGITATION, 2nd choic 

Last administered on 4/17/20at 03:51;  Start 3/19/20 at 09:00;  Stop 4/17/20 at 

09:19;  Status DC


Sodium Chloride 1,000 ml @  1,000 mls/hr Q1H PRN IV hypotension;  Start 3/19/20 

at 08:56;  Stop 3/19/20 at 14:55;  Status DC


Albumin Human 200 ml @  200 mls/hr 1X PRN  PRN IV Hypotension;  Start 3/19/20 at

09:00;  Stop 3/19/20 at 14:59;  Status DC


Diphenhydramine HCl (Benadryl) 25 mg 1X PRN  PRN IV ITCHING;  Start 3/19/20 at 

09:00;  Stop 3/20/20 at 08:59;  Status DC


Diphenhydramine HCl (Benadryl) 25 mg 1X PRN  PRN IV ITCHING;  Start 3/19/20 at 

09:00;  Stop 3/20/20 at 08:59;  Status DC


Sodium Chloride 1,000 ml @  400 mls/hr Q2H30M PRN IV PATENCY;  Start 3/19/20 at 

08:56;  Stop 3/19/20 at 20:55;  Status DC


Info (PHARMACY MONITORING -- do not chart) 1 each PRN DAILY  PRN MC SEE 

COMMENTS;  Start 3/19/20 at 09:00;  Status UNV


Info (PHARMACY MONITORING -- do not chart) 1 each PRN DAILY  PRN MC SEE 

COMMENTS;  Start 3/19/20 at 09:00;  Stop 3/20/20 at 08:13;  Status DC


Digoxin (Lanoxin) 500 mcg 1X  ONCE IV  Last administered on 3/19/20at 10:04;  

Start 3/19/20 at 10:00;  Stop 3/19/20 at 10:01;  Status DC


Digoxin (Lanoxin) 125 mcg 1X  ONCE IV  Last administered on 3/19/20at 17:10;  

Start 3/19/20 at 18:00;  Stop 3/19/20 at 18:01;  Status DC


Magnesium Sulfate 100 ml @  25 mls/hr 1X  ONCE IV  Last administered on 

3/19/20at 12:48;  Start 3/19/20 at 13:00;  Stop 3/19/20 at 16:59;  Status DC


Sodium Chloride 90 meq/Magnesium Sulfate 10 meq/ Calcium Gluconate 20 meq/ 

Multivitamins 10 ml/Chromium/ Copper/Manganese/ Seleni/Zn 0.5 ml/ Total 

Parenteral Nutrition/Amino Acids/Dextrose/ Fat Emulsion Intravenous 1,512 ml @  

63 mls/hr TPN  CONT IV  Last administered on 3/19/20at 22:25;  Start 3/19/20 at 

22:00;  Stop 3/20/20 at 21:59;  Status DC


Sodium Chloride 1,000 ml @  1,000 mls/hr Q1H PRN IV hypotension;  Start 3/20/20 

at 08:05;  Stop 3/20/20 at 14:04;  Status DC


Albumin Human 200 ml @  200 mls/hr 1X  ONCE IV  Last administered on 3/20/20at 

08:57;  Start 3/20/20 at 08:15;  Stop 3/20/20 at 09:14;  Status DC


Diphenhydramine HCl (Benadryl) 25 mg 1X PRN  PRN IV ITCHING;  Start 3/20/20 at 

08:15;  Stop 3/21/20 at 08:14;  Status DC


Diphenhydramine HCl (Benadryl) 25 mg 1X PRN  PRN IV ITCHING;  Start 3/20/20 at 

08:15;  Stop 3/21/20 at 08:14;  Status DC


Sodium Chloride 1,000 ml @  400 mls/hr Q2H30M PRN IV PATENCY;  Start 3/20/20 at 

08:05;  Stop 3/20/20 at 20:04;  Status DC


Info (PHARMACY MONITORING -- do not chart) 1 each PRN DAILY  PRN MC SEE 

COMMENTS;  Start 3/20/20 at 08:15;  Stop 3/24/20 at 07:57;  Status DC


Sodium Chloride 90 meq/Potassium Chloride 15 meq/ Potassium Phosphate 10 mmol/ 

Magnesium Sulfate 10 meq/Calcium Gluconate 20 meq/ Multivitamins 10 ml/Chromium/

Copper/Manganese/ Seleni/Zn 0.5 ml/ Total Parenteral Nutrition/Amino 

Acids/Dextrose/ Fat Emulsion Intravenous 1,512 ml @  63 mls/hr TPN  CONT IV  

Last administered on 3/20/20at 21:01;  Start 3/20/20 at 22:00;  Stop 3/21/20 at 

21:59;  Status DC


Potassium Chloride/Water 100 ml @  100 mls/hr 1X  ONCE IV  Last administered on 

3/20/20at 14:09;  Start 3/20/20 at 14:00;  Stop 3/20/20 at 14:59;  Status DC


Benzocaine (Hurricaine One) 1 spray 1X  ONCE MM  Last administered on 3/20/20at 

16:38;  Start 3/20/20 at 14:30;  Stop 3/20/20 at 14:31;  Status DC


Lidocaine HCl (Glydo (Lidocaine) Jelly) 1 thomas 1X  ONCE MM  Last administered on 

3/20/20at 16:38;  Start 3/20/20 at 14:30;  Stop 3/20/20 at 14:31;  Status DC


Linezolid/Dextrose 300 ml @  300 mls/hr Q12HR IV  Last administered on 3/26/20at

21:04;  Start 3/20/20 at 20:00;  Stop 3/27/20 at 07:50;  Status DC


Acetaminophen (Tylenol) 650 mg PRN Q6HRS  PRN PO MILD PAIN / TEMP;  Start 

3/21/20 at 03:30;  Stop 3/21/20 at 03:36;  Status DC


Acetaminophen (Tylenol) 650 mg PRN Q6HRS  PRN PEG MILD PAIN / TEMP Last 

administered on 4/16/20at 19:56;  Start 3/21/20 at 03:36;  Stop 5/13/20 at 

10:25;  Status DC


Sodium Chloride 1,000 ml @  1,000 mls/hr Q1H PRN IV hypotension;  Start 3/21/20 

at 07:50;  Stop 3/21/20 at 13:49;  Status DC


Albumin Human 200 ml @  200 mls/hr 1X PRN  PRN IV Hypotension;  Start 3/21/20 at

08:00;  Stop 3/21/20 at 13:59;  Status DC


Sodium Chloride (Normal Saline Flush) 10 ml 1X PRN  PRN IV AP catheter pack;  

Start 3/21/20 at 08:00;  Stop 3/22/20 at 07:59;  Status DC


Sodium Chloride (Normal Saline Flush) 10 ml 1X PRN  PRN IV  catheter pack;  

Start 3/21/20 at 08:00;  Stop 3/22/20 at 07:59;  Status DC


Sodium Chloride 1,000 ml @  400 mls/hr Q2H30M PRN IV PATENCY;  Start 3/21/20 at 

07:50;  Stop 3/21/20 at 19:49;  Status DC


Info (PHARMACY MONITORING -- do not chart) 1 each PRN DAILY  PRN MC SEE 

COMMENTS;  Start 3/21/20 at 08:00;  Status UNV


Info (PHARMACY MONITORING -- do not chart) 1 each PRN DAILY  PRN MC SEE 

COMMENTS;  Start 3/21/20 at 08:00;  Stop 3/23/20 at 08:25;  Status DC


Sodium Chloride 90 meq/Potassium Chloride 15 meq/ Potassium Phosphate 10 mmol/ 

Magnesium Sulfate 10 meq/Calcium Gluconate 20 meq/ Multivitamins 10 ml/Chromium/

Copper/Manganese/ Seleni/Zn 0.5 ml/ Total Parenteral Nutrition/Amino 

Acids/Dextrose/ Fat Emulsion Intravenous 1,512 ml @  63 mls/hr TPN  CONT IV  

Last administered on 3/21/20at 20:57;  Start 3/21/20 at 22:00;  Stop 3/22/20 at 

21:59;  Status DC


Sodium Chloride 90 meq/Potassium Chloride 15 meq/ Potassium Phosphate 15 mmol/ 

Magnesium Sulfate 10 meq/Calcium Gluconate 20 meq/ Multivitamins 10 ml/Chromium/

Copper/Manganese/ Seleni/Zn 0.5 ml/ Total Parenteral Nutrition/Amino Ac

ids/Dextrose/ Fat Emulsion Intravenous 1,512 ml @  63 mls/hr TPN  CONT IV ;  

Start 3/22/20 at 22:00;  Stop 3/22/20 at 14:16;  Status DC


Sodium Chloride 90 meq/Potassium Chloride 15 meq/ Potassium Phosphate 15 mmol/ 

Magnesium Sulfate 10 meq/Calcium Gluconate 20 meq/ Multivitamins 10 ml/Chromium/

Copper/Manganese/ Seleni/Zn 0.5 ml/ Total Parenteral Nutrition/Amino 

Acids/Dextrose/ Fat Emulsion Intravenous 1,200 ml @  50 mls/hr TPN  CONT IV ;  

Start 3/22/20 at 22:00;  Stop 3/22/20 at 14:17;  Status DC


Sodium Chloride 90 meq/Potassium Chloride 15 meq/ Potassium Phosphate 10 mmol/ 

Magnesium Sulfate 10 meq/Calcium Gluconate 20 meq/ Multivitamins 10 ml/Chromium/

Copper/Manganese/ Seleni/Zn 0.5 ml/ Total Parenteral Nutrition/Amino 

Acids/Dextrose/ Fat Emulsion Intravenous 1,200 ml @  50 mls/hr TPN  CONT IV  

Last administered on 3/22/20at 23:29;  Start 3/22/20 at 22:00;  Stop 3/23/20 at 

21:59;  Status DC


Sodium Chloride 1,000 ml @  1,000 mls/hr Q1H PRN IV hypotension;  Start 3/23/20 

at 07:28;  Stop 3/23/20 at 13:27;  Status DC


Albumin Human 200 ml @  200 mls/hr 1X  ONCE IV  Last administered on 3/23/20at 

08:51;  Start 3/23/20 at 07:30;  Stop 3/23/20 at 08:29;  Status DC


Diphenhydramine HCl (Benadryl) 25 mg 1X PRN  PRN IV ITCHING;  Start 3/23/20 at 

07:30;  Stop 3/24/20 at 07:29;  Status DC


Diphenhydramine HCl (Benadryl) 25 mg 1X PRN  PRN IV ITCHING;  Start 3/23/20 at 

07:30;  Stop 3/24/20 at 07:29;  Status DC


Sodium Chloride 1,000 ml @  400 mls/hr Q2H30M PRN IV PATENCY;  Start 3/23/20 at 

07:28;  Stop 3/23/20 at 19:27;  Status DC


Info (PHARMACY MONITORING -- do not chart) 1 each PRN DAILY  PRN MC SEE 

COMMENTS;  Start 3/23/20 at 07:30;  Stop 4/3/20 at 13:01;  Status DC


Metronidazole 100 ml @  100 mls/hr Q6HRS IV  Last administered on 4/8/20at 

06:26;  Start 3/23/20 at 08:30;  Stop 4/8/20 at 09:58;  Status DC


Micafungin Sodium 100 mg/Dextrose 100 ml @  100 mls/hr Q24H IV  Last 

administered on 4/30/20at 08:18;  Start 3/23/20 at 09:00;  Stop 4/30/20 at 

20:58;  Status DC


Propofol 0 ml @ As Directed STK-MED ONCE IV ;  Start 3/23/20 at 07:53;  Stop 

3/23/20 at 07:53;  Status DC


Etomidate (Amidate) 20 mg STK-MED ONCE IV ;  Start 3/23/20 at 07:53;  Stop 

3/23/20 at 07:54;  Status DC


Midazolam HCl (Versed) 5 mg STK-MED ONCE .ROUTE ;  Start 3/23/20 at 07:57;  Stop

3/23/20 at 07:57;  Status DC


Fentanyl Citrate 30 ml @ 0 mls/hr CONT  PRN IV SEE PROTOCOL Last administered on

4/17/20at 06:12;  Start 3/23/20 at 08:15;  Stop 4/17/20 at 09:19;  Status DC


Artificial Tears (Artificial Tears) 1 drop PRN Q1HR  PRN OU DRY EYE, 1st choice;

 Start 3/23/20 at 08:15;  Stop 4/29/20 at 05:31;  Status DC


Midazolam HCl 50 mg/Sodium Chloride 50 ml @ 0 mls/hr CONT  PRN IV SEE PROTOCOL 

Last administered on 3/26/20at 22:39;  Start 3/23/20 at 08:15;  Stop 3/28/20 at 

15:59;  Status DC


Etomidate (Amidate) 8 mg 1X  ONCE IV  Last administered on 3/23/20at 08:33;  

Start 3/23/20 at 08:30;  Stop 3/23/20 at 08:31;  Status DC


Succinylcholine Chloride (Anectine) 120 mg 1X  ONCE IV  Last administered on 

3/23/20at 08:34;  Start 3/23/20 at 08:30;  Stop 3/23/20 at 08:31;  Status DC


Midazolam HCl (Versed) 5 mg 1X  ONCE IV ;  Start 3/23/20 at 08:30;  Stop 3/23/20

at 08:31;  Status DC


Potassium Chloride 15 meq/ Bicarbonate Dialysis Soln w/ out KCl 5,007.5 ml  @ 

1,000 mls/ hr Q5H1M IV  Last administered on 3/24/20at 11:11;  Start 3/23/20 at 

12:00;  Stop 3/24/20 at 11:15;  Status DC


Potassium Chloride 15 meq/ Bicarbonate Dialysis Soln w/ out KCl 5,007.5 ml  @ 

1,000 mls/ hr Q5H1M IV  Last administered on 3/24/20at 11:12;  Start 3/23/20 at 

12:00;  Stop 3/24/20 at 11:17;  Status DC


Potassium Chloride 15 meq/ Bicarbonate Dialysis Soln w/ out KCl 5,007.5 ml  @ 

1,000 mls/ hr Q5H1M IV  Last administered on 3/24/20at 11:11;  Start 3/23/20 at 

12:00;  Stop 3/24/20 at 11:19;  Status DC


Sodium Chloride 90 meq/Potassium Chloride 15 meq/ Potassium Phosphate 10 mmol/ 

Magnesium Sulfate 10 meq/Calcium Gluconate 20 meq/ Multivitamins 10 ml/Chromium/

Copper/Manganese/ Seleni/Zn 0.5 ml/ Total Parenteral Nutrition/Amino 

Acids/Dextrose/ Fat Emulsion Intravenous 1,400 ml @  58.333 mls/ hr TPN  CONT IV

 Last administered on 3/23/20at 21:42;  Start 3/23/20 at 22:00;  Stop 3/24/20 at

21:59;  Status DC


Heparin Sodium (Porcine) (Heparin Sodium) 5,000 unit Q8HRS SQ  Last administered

on 3/28/20at 05:55;  Start 3/23/20 at 15:00;  Stop 3/28/20 at 13:28;  Status DC


Meropenem 500 mg/ Sodium Chloride 50 ml @  100 mls/hr Q6HRS IV  Last 

administered on 3/25/20at 06:00;  Start 3/24/20 at 09:00;  Stop 3/25/20 at 

07:29;  Status DC


Potassium Phosphate 20 mmol/ Sodium Chloride 106.6667 ml @  51.667 m... 1X  ONCE

IV  Last administered on 3/24/20at 11:22;  Start 3/24/20 at 10:15;  Stop 3/24/20

at 12:18;  Status DC


Acetaminophen (Tylenol Supp) 650 mg PRN Q6HRS  PRN CA MILD PAIN / TEMP > 100.3'F

Last administered on 8/10/20at 15:43;  Start 3/24/20 at 10:30


Potassium Chloride/Water 100 ml @  100 mls/hr Q1H IV  Last administered on 

3/24/20at 12:12;  Start 3/24/20 at 11:00;  Stop 3/24/20 at 12:59;  Status DC


Potassium Chloride 20 meq/ Bicarbonate Dialysis Soln w/ out KCl 5,010 ml @  

1,000 mls/hr Q5H1M IV  Last administered on 3/25/20at 08:48;  Start 3/24/20 at 

12:00;  Stop 3/25/20 at 13:03;  Status DC


Potassium Chloride 20 meq/ Bicarbonate Dialysis Soln w/ out KCl 5,010 ml @  

1,000 mls/hr Q5H1M IV  Last administered on 3/29/20at 14:52;  Start 3/24/20 at 

11:30;  Stop 3/29/20 at 19:59;  Status DC


Potassium Chloride 20 meq/ Bicarbonate Dialysis Soln w/ out KCl 5,010 ml @  

1,000 mls/hr Q5H1M IV  Last administered on 3/29/20at 14:53;  Start 3/24/20 at 

11:30;  Stop 3/29/20 at 19:59;  Status DC


Sodium Chloride 90 meq/Potassium Chloride 15 meq/ Potassium Phosphate 15 mmol/ 

Magnesium Sulfate 10 meq/Calcium Gluconate 15 meq/ Multivitamins 10 ml/Chromium/

Copper/Manganese/ Seleni/Zn 0.5 ml/ Total Parenteral Nutrition/Amino 

Acids/Dextrose/ Fat Emulsion Intravenous 1,400 ml @  58.333 mls/ hr TPN  CONT IV

 Last administered on 3/24/20at 22:17;  Start 3/24/20 at 22:00;  Stop 3/25/20 at

21:59;  Status DC


Cefepime HCl (Maxipime) 2 gm Q12HR IVP  Last administered on 4/7/20at 20:56;  

Start 3/25/20 at 09:00;  Stop 4/8/20 at 09:58;  Status DC


Daptomycin 500 mg/ Sodium Chloride 50 ml @  100 mls/hr Q48H IV  Last 

administered on 4/10/20at 09:57;  Start 3/25/20 at 08:30;  Stop 4/10/20 at 

10:07;  Status DC


Lidocaine HCl (Buffered Lidocaine 1%) 3 ml 1X  ONCE INJ  Last administered on 

3/25/20at 10:27;  Start 3/25/20 at 10:30;  Stop 3/25/20 at 10:31;  Status DC


Potassium Phosphate 20 mmol/ Sodium Chloride 106.6667 ml @  51.667 m... 1X  ONCE

IV  Last administered on 3/25/20at 12:51;  Start 3/25/20 at 13:00;  Stop 3/25/20

at 15:03;  Status DC


Sodium Chloride 90 meq/Potassium Chloride 15 meq/ Potassium Phosphate 18 mmol/ 

Magnesium Sulfate 8 meq/Calcium Gluconate 15 meq/ Multivitamins 10 ml/Chromium/ 

Copper/Manganese/ Seleni/Zn 0.5 ml/ Total Parenteral Nutrition/Amino 

Acids/Dextrose/ Fat Emulsion Intravenous 1,400 ml @  58.333 mls/ hr TPN  CONT IV

 Last administered on 3/25/20at 22:16;  Start 3/25/20 at 22:00;  Stop 3/26/20 at

21:59;  Status DC


Potassium Chloride 20 meq/ Bicarbonate Dialysis Soln w/ out KCl 5,010 ml @  

1,000 mls/hr Q5H1M IV  Last administered on 3/29/20at 14:54;  Start 3/25/20 at 

16:00;  Stop 3/29/20 at 19:59;  Status DC


Multi-Ingred Cream/Lotion/Oil/ Oint (Artificial Tears Eye Ointment) 1 thomas PRN 

Q1HR  PRN OU DRY EYE, 2nd choice Last administered on 4/13/20at 08:19;  Start 

3/25/20 at 17:30;  Stop 6/3/20 at 14:39;  Status DC


Sodium Chloride 90 meq/Potassium Chloride 15 meq/ Potassium Phosphate 18 mmol/ 

Magnesium Sulfate 8 meq/Calcium Gluconate 15 meq/ Multivitamins 10 ml/Chromium/ 

Copper/Manganese/ Seleni/Zn 0.5 ml/ Total Parenteral Nutrition/Amino 

Acids/Dextrose/ Fat Emulsion Intravenous 1,400 ml @  58.333 mls/ hr TPN  CONT IV

 Last administered on 3/26/20at 22:00;  Start 3/26/20 at 22:00;  Stop 3/27/20 at

21:59;  Status DC


Albumin Human 500 ml @  125 mls/hr 1X  ONCE IV ;  Start 3/26/20 at 14:15;  Stop 

3/26/20 at 18:14;  Status DC


Sodium Chloride 90 meq/Potassium Chloride 15 meq/ Potassium Phosphate 18 mmol/ 

Magnesium Sulfate 8 meq/Calcium Gluconate 15 meq/ Multivitamins 10 ml/Chromium/ 

Copper/Manganese/ Seleni/Zn 0.5 ml/ Insulin Human Regular 10 unit/ Total 

Parenteral Nutrition/Amino Acids/Dextrose/ Fat Emulsion Intravenous 1,400 ml @  

58.333 mls/ hr TPN  CONT IV  Last administered on 3/27/20at 21:43;  Start 

3/27/20 at 22:00;  Stop 3/28/20 at 21:59;  Status DC


Lidocaine HCl (Buffered Lidocaine 1%) 3 ml STK-MED ONCE .ROUTE ;  Start 3/25/20 

at 10:00;  Stop 3/27/20 at 13:57;  Status DC


Midazolam HCl 100 mg/Sodium Chloride 100 ml @ 7 mls/hr CONT  PRN IV SEE PROTOCOL

Last administered on 4/8/20at 15:35;  Start 3/28/20 at 16:00;  Stop 6/3/20 at 

14:38;  Status DC


Sodium Chloride 90 meq/Potassium Chloride 15 meq/ Potassium Phosphate 18 mmol/ 

Magnesium Sulfate 8 meq/Calcium Gluconate 15 meq/ Multivitamins 10 ml/Chromium/ 

Copper/Manganese/ Seleni/Zn 0.5 ml/ Insulin Human Regular 15 unit/ Total 

Parenteral Nutrition/Amino Acids/Dextrose/ Fat Emulsion Intravenous 1,400 ml @  

58.333 mls/ hr TPN  CONT IV  Last administered on 3/28/20at 20:34;  Start 

3/28/20 at 22:00;  Stop 3/29/20 at 21:59;  Status DC


Info ([facility] Electrolyte Protocol) 1 ea CONT PRN  PRN MC PER PROTOCOL;  Start 

3/29/20 at 13:15


Sodium Chloride 90 meq/Potassium Chloride 15 meq/ Potassium Phosphate 18 mmol/ 

Magnesium Sulfate 8 meq/Calcium Gluconate 15 meq/ Multivitamins 10 ml/Chromium/ 

Copper/Manganese/ Seleni/Zn 0.5 ml/ Insulin Human Regular 15 unit/ Total 

Parenteral Nutrition/Amino Acids/Dextrose/ Fat Emulsion Intravenous 1,400 ml @  

58.333 mls/ hr TPN  CONT IV  Last administered on 3/29/20at 22:05;  Start 

3/29/20 at 22:00;  Stop 3/30/20 at 21:59;  Status DC


Potassium Chloride 15 meq/ Bicarbonate Dialysis Soln w/ out KCl 5,007.5 ml  @ 

1,000 mls/ hr Q5H1M IV  Last administered on 4/1/20at 18:14;  Start 3/29/20 at 

20:00;  Stop 4/2/20 at 13:08;  Status DC


Potassium Chloride 15 meq/ Bicarbonate Dialysis Soln w/ out KCl 5,007.5 ml  @ 

1,000 mls/ hr Q5H1M IV  Last administered on 4/1/20at 18:14;  Start 3/29/20 at 

20:00;  Stop 4/2/20 at 13:08;  Status DC


Potassium Chloride 15 meq/ Bicarbonate Dialysis Soln w/ out KCl 5,007.5 ml  @ 

1,000 mls/ hr Q5H1M IV  Last administered on 4/1/20at 18:14;  Start 3/29/20 at 

20:00;  Stop 4/2/20 at 13:08;  Status DC


Iohexol (Omnipaque 240 Mg/ml) 30 ml 1X  ONCE PO  Last administered on 3/30/20at 

11:30;  Start 3/30/20 at 11:30;  Stop 3/30/20 at 11:33;  Status DC


Info (CONTRAST GIVEN -- Rx MONITORING) 1 each PRN DAILY  PRN MC SEE COMMENTS;  

Start 3/30/20 at 11:45;  Stop 4/1/20 at 11:44;  Status DC


Sodium Chloride 90 meq/Potassium Chloride 15 meq/ Potassium Phosphate 18 mmol/ 

Magnesium Sulfate 8 meq/Calcium Gluconate 15 meq/ Multivitamins 10 ml/Chromium/ 

Copper/Manganese/ Seleni/Zn 0.5 ml/ Insulin Human Regular 15 unit/ Total 

Parenteral Nutrition/Amino Acids/Dextrose/ Fat Emulsion Intravenous 1,400 ml @  

58.333 mls/ hr TPN  CONT IV  Last administered on 3/30/20at 21:47;  Start 

3/30/20 at 22:00;  Stop 3/31/20 at 21:59;  Status DC


Sodium Chloride 90 meq/Potassium Chloride 15 meq/ Potassium Phosphate 18 mmol/ 

Magnesium Sulfate 8 meq/Calcium Gluconate 15 meq/ Multivitamins 10 ml/Chromium/ 

Copper/Manganese/ Seleni/Zn 0.5 ml/ Insulin Human Regular 20 unit/ Total 

Parenteral Nutrition/Amino Acids/Dextrose/ Fat Emulsion Intravenous 1,400 ml @  

58.333 mls/ hr TPN  CONT IV  Last administered on 3/31/20at 21:36;  Start 

3/31/20 at 22:00;  Stop 4/1/20 at 21:59;  Status DC


Alteplase, Recombinant (Cathflo For Central Catheter Clearance) 1 mg 1X  ONCE 

INT CAT  Last administered on 3/31/20at 20:03;  Start 3/31/20 at 19:30;  Stop 

3/31/20 at 19:46;  Status DC


Alteplase, Recombinant (Cathflo For Central Catheter Clearance) 1 mg 1X  ONCE 

INT CAT  Last administered on 3/31/20at 22:05;  Start 3/31/20 at 22:00;  Stop 

3/31/20 at 22:01;  Status DC


Sodium Chloride 90 meq/Potassium Chloride 15 meq/ Potassium Phosphate 18 mmol/ 

Magnesium Sulfate 8 meq/Calcium Gluconate 15 meq/ Multivitamins 10 ml/Chromium/ 

Copper/Manganese/ Seleni/Zn 0.5 ml/ Insulin Human Regular 20 unit/ Total 

Parenteral Nutrition/Amino Acids/Dextrose/ Fat Emulsion Intravenous 1,400 ml @  

58.333 mls/ hr TPN  CONT IV  Last administered on 4/1/20at 21:30;  Start 4/1/20 

at 22:00;  Stop 4/2/20 at 21:59;  Status DC


Dexmedetomidine HCl 400 mcg/ Sodium Chloride 100 ml @ 0 mls/hr CONT  PRN IV 

ANXIETY / AGITATION Last administered on 5/30/20at 12:57;  Start 4/2/20 at 

08:15;  Stop 5/30/20 at 18:31;  Status DC


Sodium Chloride 500 ml @  500 mls/hr 1X PRN  PRN IV ELEVATED BP, SEE COMMENTS;  

Start 4/2/20 at 08:15;  Stop 8/5/20 at 09:08;  Status DC


Atropine Sulfate (ATROPINE 0.5mg SYRINGE) 0.5 mg PRN Q5MIN  PRN IV SEE COMMENTS;

 Start 4/2/20 at 08:15;  Stop 8/3/20 at 09:45;  Status DC


Furosemide (Lasix) 20 mg 1X  ONCE IVP  Last administered on 4/2/20at 08:19;  

Start 4/2/20 at 08:15;  Stop 4/2/20 at 08:16;  Status DC


Lidocaine HCl (Buffered Lidocaine 1%) 3 ml STK-MED ONCE .ROUTE ;  Start 4/2/20 

at 08:39;  Stop 4/2/20 at 08:39;  Status DC


Lidocaine HCl (Buffered Lidocaine 1%) 6 ml 1X  ONCE INJ  Last administered on 

4/2/20at 09:05;  Start 4/2/20 at 09:00;  Stop 4/2/20 at 09:06;  Status DC


Sodium Chloride 90 meq/Potassium Chloride 15 meq/ Potassium Phosphate 18 mmol/ 

Magnesium Sulfate 8 meq/Calcium Gluconate 15 meq/ Multivitamins 10 ml/Chromium/ 

Copper/Manganese/ Seleni/Zn 0.5 ml/ Insulin Human Regular 20 unit/ Total 

Parenteral Nutrition/Amino Acids/Dextrose/ Fat Emulsion Intravenous 1,400 ml @  

58.333 mls/ hr TPN  CONT IV  Last administered on 4/2/20at 22:45;  Start 4/2/20 

at 22:00;  Stop 4/3/20 at 21:59;  Status DC


Sodium Chloride 1,000 ml @  1,000 mls/hr Q1H PRN IV hypotension;  Start 4/3/20 

at 07:30;  Stop 4/3/20 at 13:29;  Status DC


Albumin Human 200 ml @  200 mls/hr 1X PRN  PRN IV Hypotension Last administered 

on 4/3/20at 09:36;  Start 4/3/20 at 07:30;  Stop 4/3/20 at 13:29;  Status DC


Sodium Chloride (Normal Saline Flush) 10 ml 1X PRN  PRN IV AP catheter pack;  

Start 4/3/20 at 07:30;  Stop 4/3/20 at 21:29;  Status DC


Sodium Chloride (Normal Saline Flush) 10 ml 1X PRN  PRN IV  catheter pack;  

Start 4/3/20 at 07:30;  Stop 4/4/20 at 07:29;  Status DC


Sodium Chloride 1,000 ml @  400 mls/hr Q2H30M PRN IV PATENCY;  Start 4/3/20 at 

07:30;  Stop 4/3/20 at 19:29;  Status DC


Info (PHARMACY MONITORING -- do not chart) 1 each PRN DAILY  PRN MC SEE 

COMMENTS;  Start 4/3/20 at 07:30;  Stop 4/3/20 at 13:02;  Status DC


Info (PHARMACY MONITORING -- do not chart) 1 each PRN DAILY  PRN MC SEE 

COMMENTS;  Start 4/3/20 at 07:30;  Stop 4/5/20 at 12:45;  Status DC


Sodium Chloride 90 meq/Potassium Chloride 15 meq/ Potassium Phosphate 10 mmol/ 

Magnesium Sulfate 8 meq/Calcium Gluconate 15 meq/ Multivitamins 10 ml/Chromium/ 

Copper/Manganese/ Seleni/Zn 0.5 ml/ Insulin Human Regular 25 unit/ Total Pa

renteral Nutrition/Amino Acids/Dextrose/ Fat Emulsion Intravenous 1,400 ml @  

58.333 mls/ hr TPN  CONT IV  Last administered on 4/3/20at 22:19;  Start 4/3/20 

at 22:00;  Stop 4/4/20 at 21:59;  Status DC


Heparin Sodium (Porcine) (Heparin Sodium) 5,000 unit Q12HR SQ  Last administered

on 4/26/20at 08:59;  Start 4/3/20 at 21:00;  Stop 4/26/20 at 10:05;  Status DC


Ondansetron HCl (Zofran) 4 mg PRN Q6HRS  PRN IV NAUSEA/VOMITING;  Start 4/6/20 

at 07:00;  Stop 4/7/20 at 06:59;  Status DC


Fentanyl Citrate (Fentanyl 2ml Vial) 25 mcg PRN Q5MIN  PRN IV MILD PAIN 1-3;  

Start 4/6/20 at 07:00;  Stop 4/7/20 at 06:59;  Status DC


Fentanyl Citrate (Fentanyl 2ml Vial) 50 mcg PRN Q5MIN  PRN IV MODERATE TO SEVERE

PAIN;  Start 4/6/20 at 07:00;  Stop 4/7/20 at 06:59;  Status DC


Ringer's Solution 1,000 ml @  30 mls/hr Q24H IV ;  Start 4/6/20 at 07:00;  Stop 

4/6/20 at 18:59;  Status DC


Lidocaine HCl (Xylocaine-Mpf 1% 2ml Vial) 2 ml PRN 1X  PRN ID PRIOR TO IV START;

 Start 4/6/20 at 07:00;  Stop 4/7/20 at 06:59;  Status DC


Prochlorperazine Edisylate (Compazine) 5 mg PACU PRN  PRN IV NAUSEA, MRX1;  

Start 4/6/20 at 07:00;  Stop 4/7/20 at 06:59;  Status DC


Sodium Chloride 1,000 ml @  1,000 mls/hr Q1H PRN IV hypotension;  Start 4/4/20 

at 09:10;  Stop 4/4/20 at 15:09;  Status DC


Albumin Human 200 ml @  200 mls/hr 1X PRN  PRN IV Hypotension Last administered 

on 4/4/20at 10:10;  Start 4/4/20 at 09:15;  Stop 4/4/20 at 15:14;  Status DC


Sodium Chloride 1,000 ml @  400 mls/hr Q2H30M PRN IV PATENCY;  Start 4/4/20 at 

09:10;  Stop 4/4/20 at 21:09;  Status DC


Info (PHARMACY MONITORING -- do not chart) 1 each PRN DAILY  PRN MC SEE 

COMMENTS;  Start 4/4/20 at 09:15;  Stop 4/5/20 at 12:45;  Status DC


Info (PHARMACY MONITORING -- do not chart) 1 each PRN DAILY  PRN MC SEE 

COMMENTS;  Start 4/4/20 at 09:15;  Stop 4/5/20 at 12:45;  Status DC


Sodium Chloride 90 meq/Potassium Chloride 15 meq/ Potassium Phosphate 10 mmol/ 

Magnesium Sulfate 8 meq/Calcium Gluconate 15 meq/ Multivitamins 10 ml/Chromium/ 

Copper/Manganese/ Seleni/Zn 0.5 ml/ Insulin Human Regular 25 unit/ Total 

Parenteral Nutrition/Amino Acids/Dextrose/ Fat Emulsion Intravenous 1,400 ml @  

58.333 mls/ hr TPN  CONT IV  Last administered on 4/4/20at 22:10;  Start 4/4/20 

at 22:00;  Stop 4/5/20 at 21:59;  Status DC


Magnesium Sulfate 50 ml @ 25 mls/hr PRN DAILY  PRN IV for Mag < 1.7 on am labs 

Last administered on 6/18/20at 10:57;  Start 4/5/20 at 09:15


Sodium Chloride 90 meq/Potassium Chloride 15 meq/ Potassium Phosphate 10 mmol/ 

Magnesium Sulfate 8 meq/Calcium Gluconate 15 meq/ Multivitamins 10 ml/Chromium/ 

Copper/Manganese/ Seleni/Zn 0.5 ml/ Insulin Human Regular 25 unit/ Total 

Parenteral Nutrition/Amino Acids/Dextrose/ Fat Emulsion Intravenous 1,400 ml @  

58.333 mls/ hr TPN  CONT IV  Last administered on 4/5/20at 21:20;  Start 4/5/20 

at 22:00;  Stop 4/6/20 at 21:59;  Status DC


Sodium Chloride 1,000 ml @  1,000 mls/hr Q1H PRN IV hypotension;  Start 4/5/20 

at 12:23;  Stop 4/5/20 at 18:22;  Status DC


Albumin Human 200 ml @  200 mls/hr 1X  ONCE IV  Last administered on 4/5/20at 

13:34;  Start 4/5/20 at 12:30;  Stop 4/5/20 at 13:29;  Status DC


Diphenhydramine HCl (Benadryl) 25 mg 1X PRN  PRN IV ITCHING;  Start 4/5/20 at 

12:30;  Stop 4/6/20 at 12:29;  Status DC


Diphenhydramine HCl (Benadryl) 25 mg 1X PRN  PRN IV ITCHING;  Start 4/5/20 at 

12:30;  Stop 4/6/20 at 12:29;  Status DC


Info (PHARMACY MONITORING -- do not chart) 1 each PRN DAILY  PRN MC SEE 

COMMENTS;  Start 4/5/20 at 12:30;  Status Cancel


Bupivacaine HCl/ Epinephrine Bitart (Sensorcain-Epi 0.5%-1:274344 Mpf) 30 ml 

STK-MED ONCE .ROUTE  Last administered on 4/6/20at 11:44;  Start 4/6/20 at 

11:00;  Stop 4/6/20 at 11:01;  Status DC


Cellulose (Surgicel Fibrillar 1x2) 1 each STK-MED ONCE .ROUTE ;  Start 4/6/20 at

11:00;  Stop 4/6/20 at 11:01;  Status DC


Sodium Chloride 90 meq/Potassium Chloride 15 meq/ Potassium Phosphate 10 mmol/ 

Magnesium Sulfate 12 meq/Calcium Gluconate 15 meq/ Multivitamins 10 ml/Chromium/

Copper/Manganese/ Seleni/Zn 0.5 ml/ Insulin Human Regular 25 unit/ Total 

Parenteral Nutrition/Amino Acids/Dextrose/ Fat Emulsion Intravenous 1,400 ml @  

58.333 mls/ hr TPN  CONT IV  Last administered on 4/6/20at 22:24;  Start 4/6/20 

at 22:00;  Stop 4/7/20 at 21:59;  Status DC


Propofol 20 ml @ As Directed STK-MED ONCE IV ;  Start 4/6/20 at 11:07;  Stop 

4/6/20 at 11:07;  Status DC


Cellulose (Surgicel Hemostat 4x8) 1 each STK-MED ONCE .ROUTE  Last administered 

on 4/6/20at 11:44;  Start 4/6/20 at 11:55;  Stop 4/6/20 at 11:56;  Status DC


Sevoflurane (Ultane) 60 ml STK-MED ONCE IH ;  Start 4/6/20 at 12:46;  Stop 

4/6/20 at 12:46;  Status DC


Sodium Chloride 1,000 ml @  1,000 mls/hr Q1H PRN IV hypotension;  Start 4/6/20 

at 13:51;  Stop 4/6/20 at 19:50;  Status DC


Albumin Human 200 ml @  200 mls/hr 1X PRN  PRN IV Hypotension Last administered 

on 4/6/20at 14:51;  Start 4/6/20 at 14:00;  Stop 4/6/20 at 19:59;  Status DC


Diphenhydramine HCl (Benadryl) 25 mg 1X PRN  PRN IV ITCHING;  Start 4/6/20 at 

14:00;  Stop 4/7/20 at 13:59;  Status DC


Diphenhydramine HCl (Benadryl) 25 mg 1X PRN  PRN IV ITCHING;  Start 4/6/20 at 

14:00;  Stop 4/7/20 at 13:59;  Status DC


Sodium Chloride 1,000 ml @  400 mls/hr Q2H30M PRN IV PATENCY;  Start 4/6/20 at 

13:51;  Stop 4/7/20 at 01:50;  Status DC


Info (PHARMACY MONITORING -- do not chart) 1 each PRN DAILY  PRN MC SEE 

COMMENTS;  Start 4/6/20 at 14:00;  Stop 4/9/20 at 08:16;  Status DC


Heparin Sodium (Porcine) (Hep Lock Adult) 500 unit STK-MED ONCE IVP ;  Start 

4/7/20 at 09:29;  Stop 4/7/20 at 09:30;  Status DC


Sodium Chloride 1,000 ml @  1,000 mls/hr Q1H PRN IV hypotension;  Start 4/7/20 

at 10:43;  Stop 4/7/20 at 16:42;  Status DC


Sodium Chloride 1,000 ml @  400 mls/hr Q2H30M PRN IV PATENCY;  Start 4/7/20 at 

10:43;  Stop 4/7/20 at 22:42;  Status DC


Info (PHARMACY MONITORING -- do not chart) 1 each PRN DAILY  PRN MC SEE 

COMMENTS;  Start 4/7/20 at 10:45;  Status UNV


Info (PHARMACY MONITORING -- do not chart) 1 each PRN DAILY  PRN MC SEE 

COMMENTS;  Start 4/7/20 at 10:45;  Status UNV


Sodium Chloride 90 meq/Potassium Chloride 15 meq/ Magnesium Sulfate 12 

meq/Calcium Gluconate 15 meq/ Multivitamins 10 ml/Chromium/ Copper/Manganese/ 

Seleni/Zn 0.5 ml/ Insulin Human Regular 25 unit/ Total Parenteral 

Nutrition/Amino Acids/Dextrose/ Fat Emulsion Intravenous 1,400 ml @  58.333 mls/

hr TPN  CONT IV  Last administered on 4/7/20at 22:13;  Start 4/7/20 at 22:00;  

Stop 4/8/20 at 21:59;  Status DC


Sodium Chloride 1,000 ml @  1,000 mls/hr Q1H PRN IV hypotension;  Start 4/8/20 

at 07:50;  Stop 4/8/20 at 13:49;  Status DC


Albumin Human 200 ml @  200 mls/hr 1X  ONCE IV ;  Start 4/8/20 at 08:00;  Stop 

4/8/20 at 08:53;  Status DC


Diphenhydramine HCl (Benadryl) 25 mg 1X PRN  PRN IV ITCHING;  Start 4/8/20 at 

08:00;  Stop 4/9/20 at 07:59;  Status DC


Diphenhydramine HCl (Benadryl) 25 mg 1X PRN  PRN IV ITCHING;  Start 4/8/20 at 

08:00;  Stop 4/9/20 at 07:59;  Status DC


Info (PHARMACY MONITORING -- do not chart) 1 each PRN DAILY  PRN MC SEE 

COMMENTS;  Start 4/8/20 at 08:00;  Stop 4/9/20 at 08:16;  Status DC


Albumin Human 50 ml @ 50 mls/hr 1X  ONCE IV ;  Start 4/8/20 at 08:53;  Stop 

4/8/20 at 08:56;  Status DC


Albumin Human 200 ml @  50 mls/hr PRN 1X  PRN IV HYPOTENSION Last administered 

on 4/14/20at 11:54;  Start 4/8/20 at 09:00;  Stop 5/21/20 at 11:14;  Status DC


Meropenem 500 mg/ Sodium Chloride 50 ml @  100 mls/hr Q12H IV  Last administered

on 4/28/20at 10:45;  Start 4/8/20 at 10:00;  Stop 4/28/20 at 12:37;  Status DC


Sodium Chloride 90 meq/Magnesium Sulfate 12 meq/ Calcium Gluconate 15 meq/ 

Multivitamins 10 ml/Chromium/ Copper/Manganese/ Seleni/Zn 0.5 ml/ Insulin Human 

Regular 25 unit/ Total Parenteral Nutrition/Amino Acids/Dextrose/ Fat Emulsion 

Intravenous 1,400 ml @  58.333 mls/ hr TPN  CONT IV  Last administered on 

4/8/20at 21:41;  Start 4/8/20 at 22:00;  Stop 4/9/20 at 21:59;  Status DC


Sodium Chloride 1,000 ml @  1,000 mls/hr Q1H PRN IV hypotension;  Start 4/9/20 

at 07:58;  Stop 4/9/20 at 13:57;  Status DC


Albumin Human 200 ml @  200 mls/hr 1X PRN  PRN IV Hypotension Last administered 

on 4/9/20at 09:30;  Start 4/9/20 at 08:00;  Stop 4/9/20 at 13:59;  Status DC


Sodium Chloride 1,000 ml @  400 mls/hr Q2H30M PRN IV PATENCY;  Start 4/9/20 at 

07:58;  Stop 4/9/20 at 19:57;  Status DC


Info (PHARMACY MONITORING -- do not chart) 1 each PRN DAILY  PRN MC SEE 

COMMENTS;  Start 4/9/20 at 08:00;  Status Cancel


Info (PHARMACY MONITORING -- do not chart) 1 each PRN DAILY  PRN MC SEE 

COMMENTS;  Start 4/9/20 at 08:15;  Status UNV


Sodium Chloride 90 meq/Potassium Phosphate 5 mmol/ Magnesium Sulfate 12 

meq/Calcium Gluconate 15 meq/ Multivitamins 10 ml/Chromium/ Copper/Manganese/ 

Seleni/Zn 0.5 ml/ Insulin Human Regular 30 unit/ Total Parenteral 

Nutrition/Amino Acids/Dextrose/ Fat Emulsion Intravenous 1,400 ml @  58.333 mls/

hr TPN  CONT IV  Last administered on 4/9/20at 22:08;  Start 4/9/20 at 22:00;  

Stop 4/10/20 at 21:59;  Status DC


Linezolid/Dextrose 300 ml @  300 mls/hr Q12HR IV  Last administered on 4/20/20at

20:40;  Start 4/10/20 at 11:00;  Stop 4/21/20 at 08:10;  Status DC


Sodium Chloride 90 meq/Potassium Phosphate 15 mmol/ Magnesium Sulfate 12 

meq/Calcium Gluconate 15 meq/ Multivitamins 10 ml/Chromium/ Copper/Manganese/ 

Seleni/Zn 0.5 ml/ Insulin Human Regular 30 unit/ Total Parenteral 

Nutrition/Amino Acids/Dextrose/ Fat Emulsion Intravenous 1,400 ml @  58.333 mls/

hr TPN  CONT IV  Last administered on 4/10/20at 21:49;  Start 4/10/20 at 22:00; 

Stop 4/11/20 at 21:59;  Status DC


Sodium Chloride 90 meq/Potassium Phosphate 15 mmol/ Magnesium Sulfate 12 meq/C

alcium Gluconate 15 meq/ Multivitamins 10 ml/Chromium/ Copper/Manganese/ 

Seleni/Zn 0.5 ml/ Insulin Human Regular 40 unit/ Total Parenteral 

Nutrition/Amino Acids/Dextrose/ Fat Emulsion Intravenous 1,400 ml @  58.333 mls/

hr TPN  CONT IV  Last administered on 4/11/20at 21:21;  Start 4/11/20 at 22:00; 

Stop 4/12/20 at 21:59;  Status DC


Sodium Chloride 1,000 ml @  1,000 mls/hr Q1H PRN IV hypotension;  Start 4/11/20 

at 13:26;  Stop 4/11/20 at 19:25;  Status DC


Albumin Human 200 ml @  200 mls/hr 1X PRN  PRN IV Hypotension Last administered 

on 4/11/20at 15:00;  Start 4/11/20 at 13:30;  Stop 4/11/20 at 19:29;  Status DC


Sodium Chloride (Normal Saline Flush) 10 ml 1X PRN  PRN IV AP catheter pack;  

Start 4/11/20 at 13:30;  Stop 4/12/20 at 13:29;  Status DC


Sodium Chloride (Normal Saline Flush) 10 ml 1X PRN  PRN IV  catheter pack;  

Start 4/11/20 at 13:30;  Stop 4/12/20 at 13:29;  Status DC


Sodium Chloride 1,000 ml @  400 mls/hr Q2H30M PRN IV PATENCY;  Start 4/11/20 at 

13:26;  Stop 4/12/20 at 01:25;  Status DC


Info (PHARMACY MONITORING -- do not chart) 1 each PRN DAILY  PRN MC SEE 

COMMENTS;  Start 4/11/20 at 13:30;  Stop 4/11/20 at 13:33;  Status DC


Info (PHARMACY MONITORING -- do not chart) 1 each PRN DAILY  PRN MC SEE 

COMMENTS;  Start 4/11/20 at 13:30;  Stop 4/11/20 at 13:34;  Status DC


Sodium Chloride 90 meq/Potassium Phosphate 19 mmol/ Magnesium Sulfate 12 

meq/Calcium Gluconate 15 meq/ Multivitamins 10 ml/Chromium/ Copper/Manganese/ 

Seleni/Zn 0.5 ml/ Insulin Human Regular 40 unit/ Total Parenteral 

Nutrition/Amino Acids/Dextrose/ Fat Emulsion Intravenous 1,400 ml @  58.333 mls/

hr TPN  CONT IV  Last administered on 4/12/20at 21:54;  Start 4/12/20 at 22:00; 

Stop 4/13/20 at 21:59;  Status DC


Sodium Chloride 1,000 ml @  1,000 mls/hr Q1H PRN IV hypotension;  Start 4/13/20 

at 09:35;  Stop 4/13/20 at 15:34;  Status DC


Albumin Human 200 ml @  200 mls/hr 1X PRN  PRN IV Hypotension;  Start 4/13/20 at

09:45;  Stop 4/13/20 at 15:44;  Status DC


Diphenhydramine HCl (Benadryl) 25 mg 1X PRN  PRN IV ITCHING;  Start 4/13/20 at 

09:45;  Stop 4/14/20 at 09:44;  Status DC


Diphenhydramine HCl (Benadryl) 25 mg 1X PRN  PRN IV ITCHING;  Start 4/13/20 at 

09:45;  Stop 4/14/20 at 09:44;  Status DC


Sodium Chloride 1,000 ml @  400 mls/hr Q2H30M PRN IV PATENCY;  Start 4/13/20 at 

09:35;  Stop 4/13/20 at 21:34;  Status DC


Info (PHARMACY MONITORING -- do not chart) 1 each PRN DAILY  PRN MC SEE 

COMMENTS;  Start 4/13/20 at 09:45;  Status Cancel


Sodium Chloride 100 meq/Potassium Phosphate 19 mmol/ Magnesium Sulfate 12 

meq/Calcium Gluconate 15 meq/ Multivitamins 10 ml/Chromium/ Copper/Manganese/ 

Seleni/Zn 0.5 ml/ Insulin Human Regular 40 unit/ Potassium Chloride 20 meq/ 

Total Parenteral Nutrition/Amino Acids/Dextrose/ Fat Emulsion Intravenous 1,400 

ml @  58.333 mls/ hr TPN  CONT IV  Last administered on 4/13/20at 22:02;  Start 

4/13/20 at 22:00;  Stop 4/14/20 at 21:59;  Status DC


Furosemide (Lasix) 40 mg 1X  ONCE IVP  Last administered on 4/13/20at 14:39;  

Start 4/13/20 at 14:30;  Stop 4/13/20 at 14:31;  Status DC


Metronidazole 100 ml @  100 mls/hr Q8HRS IV  Last administered on 4/21/20at 

06:04;  Start 4/14/20 at 10:00;  Stop 4/21/20 at 08:10;  Status DC


Sodium Chloride 1,000 ml @  1,000 mls/hr Q1H PRN IV hypotension;  Start 4/14/20 

at 08:00;  Stop 4/14/20 at 13:59;  Status DC


Albumin Human 200 ml @  200 mls/hr 1X PRN  PRN IV Hypotension;  Start 4/14/20 at

08:00;  Stop 4/14/20 at 13:59;  Status DC


Sodium Chloride 1,000 ml @  400 mls/hr Q2H30M PRN IV PATENCY;  Start 4/14/20 at 

08:00;  Stop 4/14/20 at 19:59;  Status DC


Info (PHARMACY MONITORING -- do not chart) 1 each PRN DAILY  PRN MC SEE 

COMMENTS;  Start 4/14/20 at 11:30;  Status UNV


Info (PHARMACY MONITORING -- do not chart) 1 each PRN DAILY  PRN MC SEE 

COMMENTS;  Start 4/14/20 at 11:30;  Stop 4/16/20 at 12:13;  Status DC


Sodium Chloride 100 meq/Potassium Phosphate 19 mmol/ Magnesium Sulfate 12 

meq/Calcium Gluconate 15 meq/ Multivitamins 10 ml/Chromium/ Copper/Manganese/ 

Seleni/Zn 0.5 ml/ Insulin Human Regular 40 unit/ Potassium Chloride 20 meq/ 

Total Parenteral Nutrition/Amino Acids/Dextrose/ Fat Emulsion Intravenous 1,400 

ml @  58.333 mls/ hr TPN  CONT IV  Last administered on 4/14/20at 21:52;  Start 

4/14/20 at 22:00;  Stop 4/15/20 at 21:59;  Status DC


Sodium Chloride (Normal Saline Flush) 10 ml QSHIFT  PRN IV AFTER MEDS AND BLOOD 

DRAWS;  Start 4/14/20 at 15:00;  Stop 5/12/20 at 11:27;  Status DC


Sodium Chloride (Normal Saline Flush) 10 ml PRN Q5MIN  PRN IV AFTER MEDS AND 

BLOOD DRAWS;  Start 4/14/20 at 15:00;  Stop 8/1/20 at 10:12;  Status DC


Sodium Chloride (Normal Saline Flush) 20 ml PRN Q5MIN  PRN IV AFTER MEDS AND 

BLOOD DRAWS;  Start 4/14/20 at 15:00


Sodium Chloride 100 meq/Potassium Phosphate 19 mmol/ Magnesium Sulfate 12 

meq/Calcium Gluconate 15 meq/ Multivitamins 10 ml/Chromium/ Copper/Manganese/ 

Seleni/Zn 0.5 ml/ Insulin Human Regular 40 unit/ Potassium Chloride 20 meq/ 

Total Parenteral Nutrition/Amino Acids/Dextrose/ Fat Emulsion Intravenous 1,400 

ml @  58.333 mls/ hr TPN  CONT IV  Last administered on 4/15/20at 21:20;  Start 

4/15/20 at 22:00;  Stop 4/16/20 at 21:59;  Status DC


Lidocaine HCl (Buffered Lidocaine 1%) 3 ml STK-MED ONCE .ROUTE ;  Start 4/15/20 

at 13:16;  Stop 4/15/20 at 13:16;  Status DC


Lidocaine HCl (Buffered Lidocaine 1%) 6 ml 1X  ONCE INJ  Last administered on 

4/15/20at 13:45;  Start 4/15/20 at 13:30;  Stop 4/15/20 at 13:31;  Status DC


Albumin Human 100 ml @  100 mls/hr 1X  ONCE IV  Last administered on 4/15/20at 

15:41;  Start 4/15/20 at 15:00;  Stop 4/15/20 at 15:59;  Status DC


Albumin Human 50 ml @ 50 mls/hr 1X  ONCE IV  Last administered on 4/15/20at 

15:00;  Start 4/15/20 at 15:00;  Stop 4/15/20 at 15:59;  Status DC


Info (PHARMACY MONITORING -- do not chart) 1 each PRN DAILY  PRN MC SEE 

COMMENTS;  Start 4/16/20 at 11:30;  Status Cancel


Info (PHARMACY MONITORING -- do not chart) 1 each PRN DAILY  PRN MC SEE 

COMMENTS;  Start 4/16/20 at 11:30;  Status UNV


Sodium Chloride 100 meq/Potassium Phosphate 10 mmol/ Magnesium Sulfate 12 

meq/Calcium Gluconate 15 meq/ Multivitamins 10 ml/Chromium/ Copper/Manganese/ 

Seleni/Zn 0.5 ml/ Insulin Human Regular 35 unit/ Potassium Chloride 20 meq/ 

Total Parenteral Nutrition/Amino Acids/Dextrose/ Fat Emulsion Intravenous 1,400 

ml @  58.333 mls/ hr TPN  CONT IV  Last administered on 4/16/20at 22:10;  Start 

4/16/20 at 22:00;  Stop 4/17/20 at 21:59;  Status DC


Sodium Chloride 100 meq/Potassium Phosphate 5 mmol/ Magnesium Sulfate 12 

meq/Calcium Gluconate 15 meq/ Multivitamins 10 ml/Chromium/ Copper/Manganese/ 

Seleni/Zn 0.5 ml/ Insulin Human Regular 35 unit/ Potassium Chloride 20 meq/ 

Total Parenteral Nutrition/Amino Acids/Dextrose/ Fat Emulsion Intravenous 1,400 

ml @  58.333 mls/ hr TPN  CONT IV  Last administered on 4/17/20at 22:59;  Start 

4/17/20 at 22:00;  Stop 4/18/20 at 21:59;  Status DC


Sodium Chloride 1,000 ml @  1,000 mls/hr Q1H PRN IV hypotension;  Start 4/18/20 

at 08:27;  Stop 4/18/20 at 14:26;  Status DC


Albumin Human 200 ml @  200 mls/hr 1X PRN  PRN IV Hypotension Last administered 

on 4/18/20at 09:18;  Start 4/18/20 at 08:30;  Stop 4/18/20 at 14:29;  Status DC


Sodium Chloride 1,000 ml @  400 mls/hr Q2H30M PRN IV PATENCY;  Start 4/18/20 at 

08:27;  Stop 4/18/20 at 20:26;  Status DC


Info (PHARMACY MONITORING -- do not chart) 1 each PRN DAILY  PRN MC SEE 

COMMENTS;  Start 4/18/20 at 08:30;  Status Cancel


Info (PHARMACY MONITORING -- do not chart) 1 each PRN DAILY  PRN MC SEE 

COMMENTS;  Start 4/18/20 at 08:30;  Stop 4/26/20 at 13:10;  Status DC


Sodium Chloride 100 meq/Potassium Chloride 40 meq/ Magnesium Sulfate 15 

meq/Calcium Gluconate 15 meq/ Multivitamins 10 ml/Chromium/ Copper/Manganese/ 

Seleni/Zn 0.5 ml/ Insulin Human Regular 35 unit/ Total Parenteral Nutrition/A

rajesh Acids/Dextrose/ Fat Emulsion Intravenous 1,400 ml @  58.333 mls/ hr TPN  

CONT IV  Last administered on 4/18/20at 22:00;  Start 4/18/20 at 22:00;  Stop 

4/19/20 at 21:59;  Status DC


Potassium Chloride/Water 100 ml @  100 mls/hr 1X  ONCE IV  Last administered on 

4/18/20at 17:28;  Start 4/18/20 at 14:45;  Stop 4/18/20 at 15:44;  Status DC


Sodium Chloride 100 meq/Potassium Chloride 40 meq/ Magnesium Sulfate 15 

meq/Calcium Gluconate 15 meq/ Multivitamins 10 ml/Chromium/ Copper/Manganese/ 

Seleni/Zn 0.5 ml/ Insulin Human Regular 35 unit/ Total Parenteral 

Nutrition/Amino Acids/Dextrose/ Fat Emulsion Intravenous 1,400 ml @  58.333 mls/

hr TPN  CONT IV  Last administered on 4/19/20at 22:46;  Start 4/19/20 at 22:00; 

Stop 4/20/20 at 21:59;  Status DC


Sodium Chloride 100 meq/Potassium Chloride 40 meq/ Magnesium Sulfate 20 

meq/Calcium Gluconate 15 meq/ Multivitamins 10 ml/Chromium/ Copper/Manganese/ 

Seleni/Zn 0.5 ml/ Insulin Human Regular 35 unit/ Total Parenteral 

Nutrition/Amino Acids/Dextrose/ Fat Emulsion Intravenous 1,400 ml @  58.333 mls/

hr TPN  CONT IV  Last administered on 4/20/20at 22:31;  Start 4/20/20 at 22:00; 

Stop 4/21/20 at 21:59;  Status DC


Fentanyl Citrate (Fentanyl 2ml Vial) 50 mcg PRN Q2HR  PRN IVP PAIN Last 

administered on 4/27/20at 13:32;  Start 4/20/20 at 21:00;  Stop 4/28/20 at 

12:53;  Status DC


Fentanyl Citrate (Fentanyl 2ml Vial) 25 mcg PRN Q2HR  PRN IVP PAIN;  Start 4/ 20/20 at 21:00;  Stop 4/28/20 at 12:54;  Status DC


Enoxaparin Sodium (Lovenox 100mg Syringe) 100 mg Q12HR SQ ;  Start 4/21/20 at 

21:00;  Status UNV


Amino Acids/ Glycerin/ Electrolytes 1,000 ml @  75 mls/hr [id number] IV ;  Start 

4/20/20 at 21:15;  Status UNV


Sodium Chloride 1,000 ml @  1,000 mls/hr Q1H PRN IV hypotension;  Start 4/21/20 

at 07:56;  Stop 4/21/20 at 13:55;  Status DC


Albumin Human 200 ml @  200 mls/hr 1X PRN  PRN IV Hypotension Last administered 

on 4/21/20at 08:40;  Start 4/21/20 at 08:00;  Stop 4/21/20 at 13:59;  Status DC


Sodium Chloride 1,000 ml @  400 mls/hr Q2H30M PRN IV PATENCY;  Start 4/21/20 at 

07:56;  Stop 4/21/20 at 19:55;  Status DC


Info (PHARMACY MONITORING -- do not chart) 1 each PRN DAILY  PRN MC SEE 

COMMENTS;  Start 4/21/20 at 08:00;  Status UNV


Info (PHARMACY MONITORING -- do not chart) 1 each PRN DAILY  PRN MC SEE 

COMMENTS;  Start 4/21/20 at 08:00;  Status UNV


Daptomycin 430 mg/ Sodium Chloride 50 ml @  100 mls/hr Q24H IV  Last 

administered on 4/21/20at 12:35;  Start 4/21/20 at 09:00;  Stop 4/21/20 at 

12:49;  Status DC


Sodium Chloride 100 meq/Potassium Chloride 40 meq/ Magnesium Sulfate 20 

meq/Calcium Gluconate 15 meq/ Multivitamins 10 ml/Chromium/ Copper/Manganese/ 

Seleni/Zn 0.5 ml/ Insulin Human Regular 35 unit/ Total Parenteral N

utrition/Amino Acids/Dextrose/ Fat Emulsion Intravenous 1,400 ml @  58.333 mls/ 

hr TPN  CONT IV  Last administered on 4/21/20at 21:26;  Start 4/21/20 at 22:00; 

Stop 4/22/20 at 21:59;  Status DC


Daptomycin 430 mg/ Sodium Chloride 50 ml @  100 mls/hr Q48H IV ;  Start 4/23/20 

at 09:00;  Stop 4/22/20 at 11:55;  Status DC


Sodium Chloride 100 meq/Potassium Chloride 40 meq/ Magnesium Sulfate 20 

meq/Calcium Gluconate 15 meq/ Multivitamins 10 ml/Chromium/ Copper/Manganese/ 

Seleni/Zn 0.5 ml/ Insulin Human Regular 35 unit/ Total Parenteral 

Nutrition/Amino Acids/Dextrose/ Fat Emulsion Intravenous 1,400 ml @  58.333 mls/

hr TPN  CONT IV  Last administered on 4/22/20at 22:27;  Start 4/22/20 at 22:00; 

Stop 4/23/20 at 21:59;  Status DC


Daptomycin 430 mg/ Sodium Chloride 50 ml @  100 mls/hr Q24H IV  Last 

administered on 4/24/20at 15:07;  Start 4/22/20 at 13:00;  Stop 4/25/20 at 

13:15;  Status DC


Sodium Chloride 100 meq/Potassium Chloride 40 meq/ Magnesium Sulfate 20 

meq/Calcium Gluconate 10 meq/ Multivitamins 10 ml/Chromium/ Copper/Manganese/ 

Seleni/Zn 0.5 ml/ Insulin Human Regular 35 unit/ Total Parenteral 

Nutrition/Amino Acids/Dextrose/ Fat Emulsion Intravenous 1,400 ml @  58.333 mls/

hr TPN  CONT IV  Last administered on 4/24/20at 00:06;  Start 4/23/20 at 22:00; 

Stop 4/24/20 at 21:59;  Status DC


Alteplase, Recombinant (Cathflo For Central Catheter Clearance) 1 mg 1X  ONCE 

INT CAT  Last administered on 4/24/20at 11:44;  Start 4/24/20 at 10:45;  Stop 

4/24/20 at 10:46;  Status DC


Ondansetron HCl (Zofran) 4 mg PRN Q6HRS  PRN IV NAUSEA/VOMITING;  Start 4/27/20 

at 07:00;  Stop 4/28/20 at 06:59;  Status DC


Fentanyl Citrate (Fentanyl 2ml Vial) 25 mcg PRN Q5MIN  PRN IV MILD PAIN 1-3;  

Start 4/27/20 at 07:00;  Stop 4/28/20 at 06:59;  Status DC


Fentanyl Citrate (Fentanyl 2ml Vial) 50 mcg PRN Q5MIN  PRN IV MODERATE TO SEVERE

PAIN Last administered on 4/27/20at 10:17;  Start 4/27/20 at 07:00;  Stop 

4/28/20 at 06:59;  Status DC


Ringer's Solution 1,000 ml @  30 mls/hr Q24H IV ;  Start 4/27/20 at 07:00;  Stop

4/27/20 at 18:59;  Status DC


Lidocaine HCl (Xylocaine-Mpf 1% 2ml Vial) 2 ml PRN 1X  PRN ID PRIOR TO IV START;

 Start 4/27/20 at 07:00;  Stop 4/28/20 at 06:59;  Status DC


Prochlorperazine Edisylate (Compazine) 5 mg PACU PRN  PRN IV NAUSEA, MRX1;  

Start 4/27/20 at 07:00;  Stop 4/28/20 at 06:59;  Status DC


Sodium Acetate 50 meq/Potassium Acetate 55 meq/ Magnesium Sulfate 20 meq/Calcium

Gluconate 10 meq/ Multivitamins 10 ml/Chromium/ Copper/Manganese/ Seleni/Zn 0.5 

ml/ Insulin Human Regular 35 unit/ Total Parenteral Nutrition/Amino Acids/De

xtrose/ Fat Emulsion Intravenous 1,400 ml @  58.333 mls/ hr TPN  CONT IV ;  

Start 4/24/20 at 22:00;  Stop 4/24/20 at 14:15;  Status DC


Sodium Acetate 50 meq/Potassium Acetate 55 meq/ Magnesium Sulfate 20 meq/Calcium

Gluconate 10 meq/ Multivitamins 10 ml/Chromium/ Copper/Manganese/ Seleni/Zn 0.5 

ml/ Insulin Human Regular 35 unit/ Total Parenteral Nutrition/Amino 

Acids/Dextrose/ Fat Emulsion Intravenous 1,800 ml @  75 mls/hr TPN  CONT IV  

Last administered on 4/24/20at 22:38;  Start 4/24/20 at 22:00;  Stop 4/25/20 at 

21:59;  Status DC


Sodium Chloride 1,000 ml @  1,000 mls/hr Q1H PRN IV hypotension;  Start 4/24/20 

at 15:31;  Stop 4/24/20 at 21:30;  Status DC


Diphenhydramine HCl (Benadryl) 25 mg 1X PRN  PRN IV ITCHING;  Start 4/24/20 at 

15:45;  Stop 4/25/20 at 15:44;  Status DC


Diphenhydramine HCl (Benadryl) 25 mg 1X PRN  PRN IV ITCHING;  Start 4/24/20 at 

15:45;  Stop 4/25/20 at 15:44;  Status DC


Sodium Chloride 1,000 ml @  400 mls/hr Q2H30M PRN IV PATENCY;  Start 4/24/20 at 

15:31;  Stop 4/25/20 at 03:30;  Status DC


Info (PHARMACY MONITORING -- do not chart) 1 each PRN DAILY  PRN MC SEE 

COMMENTS;  Start 4/24/20 at 15:45;  Stop 5/26/20 at 14:14;  Status DC


Sodium Acetate 50 meq/Potassium Acetate 55 meq/ Magnesium Sulfate 20 meq/Calcium

Gluconate 10 meq/ Multivitamins 10 ml/Chromium/ Copper/Manganese/ Seleni/Zn 0.5 

ml/ Insulin Human Regular 35 unit/ Total Parenteral Nutrition/Amino Acids/Dex

trose/ Fat Emulsion Intravenous 1,800 ml @  75 mls/hr TPN  CONT IV  Last 

administered on 4/25/20at 22:03;  Start 4/25/20 at 22:00;  Stop 4/26/20 at 

21:59;  Status DC


Daptomycin 430 mg/ Sodium Chloride 50 ml @  100 mls/hr Q24H IV  Last 

administered on 4/30/20at 13:00;  Start 4/25/20 at 13:00;  Stop 4/30/20 at 

20:58;  Status DC


Heparin Sodium (Porcine) 1000 unit/Sodium Chloride 1,001 ml @  1,001 mls/hr 1X  

ONCE IRR ;  Start 4/27/20 at 06:00;  Stop 4/27/20 at 06:59;  Status DC


Potassium Acetate 55 meq/Magnesium Sulfate 20 meq/ Calcium Gluconate 10 meq/ 

Multivitamins 10 ml/Chromium/ Copper/Manganese/ Seleni/Zn 0.5 ml/ Insulin Human 

Regular 35 unit/ Total Parenteral Nutrition/Amino Acids/Dextrose/ Fat Emulsion 

Intravenous 1,920 ml @  80 mls/hr TPN  CONT IV  Last administered on 4/26/20at 

22:10;  Start 4/26/20 at 22:00;  Stop 4/27/20 at 21:59;  Status DC


Dexamethasone Sodium Phosphate (Decadron) 4 mg STK-MED ONCE .ROUTE ;  Start 

4/27/20 at 10:56;  Stop 4/27/20 at 10:57;  Status DC


Ondansetron HCl (Zofran) 4 mg STK-MED ONCE .ROUTE ;  Start 4/27/20 at 10:56;  

Stop 4/27/20 at 10:57;  Status DC


Rocuronium Bromide (Zemuron) 50 mg STK-MED ONCE .ROUTE ;  Start 4/27/20 at 

10:56;  Stop 4/27/20 at 10:57;  Status DC


Fentanyl Citrate (Fentanyl 2ml Vial) 100 mcg STK-MED ONCE .ROUTE ;  Start 

4/27/20 at 10:56;  Stop 4/27/20 at 10:57;  Status DC


Bupivacaine HCl/ Epinephrine Bitart (Sensorcain-Epi 0.5%-1:[id number] Mpf) 30 ml 

STK-MED ONCE .ROUTE  Last administered on 4/27/20at 12:01;  Start 4/27/20 at 

10:58;  Stop 4/27/20 at 10:58;  Status DC


Cellulose (Surgicel Hemostat 2x14) 1 each STK-MED ONCE .ROUTE ;  Start 4/27/20 

at 10:58;  Stop 4/27/20 at 10:59;  Status DC


Iohexol (Omnipaque 300 Mg/ml) 50 ml STK-MED ONCE .ROUTE ;  Start 4/27/20 at 

10:58;  Stop 4/27/20 at 10:59;  Status DC


Cellulose (Surgicel Hemostat 4x8) 1 each STK-MED ONCE .ROUTE ;  Start 4/27/20 at

10:58;  Stop 4/27/20 at 10:59;  Status DC


Bisacodyl (Dulcolax Supp) 10 mg STK-MED ONCE .ROUTE ;  Start 4/27/20 at 10:59;  

Stop 4/27/20 at 10:59;  Status DC


Heparin Sodium (Porcine) 1000 unit/Sodium Chloride 1,001 ml @  1,001 mls/hr 1X  

ONCE IRR ;  Start 4/27/20 at 12:00;  Stop 4/27/20 at 12:59;  Status DC


Propofol 20 ml @ As Directed STK-MED ONCE IV ;  Start 4/27/20 at 11:05;  Stop 

4/27/20 at 11:05;  Status DC


Sevoflurane (Ultane) 90 ml STK-MED ONCE IH ;  Start 4/27/20 at 11:05;  Stop 

4/27/20 at 11:05;  Status DC


Sevoflurane (Ultane) 60 ml STK-MED ONCE IH ;  Start 4/27/20 at 12:26;  Stop 

4/27/20 at 12:27;  Status DC


Propofol 20 ml @ As Directed STK-MED ONCE IV ;  Start 4/27/20 at 12:26;  Stop 

4/27/20 at 12:27;  Status DC


Phenylephrine HCl (PHENYLEPHRINE in 0.9% NACL PF) 1 mg STK-MED ONCE IV ;  Start 

4/27/20 at 12:34;  Stop 4/27/20 at 12:34;  Status DC


Heparin Sodium (Porcine) (Heparin Sodium) 5,000 unit Q12HR SQ  Last administered

on 5/6/20at 20:57;  Start 4/27/20 at 21:00;  Stop 5/7/20 at 09:59;  Status DC


Sodium Chloride (Normal Saline Flush) 3 ml QSHIFT  PRN IV AFTER MEDS AND BLOOD 

DRAWS;  Start 4/27/20 at 13:45;  Status Cancel


Naloxone HCl (Narcan) 0.4 mg PRN Q2MIN  PRN IV SEE INSTRUCTIONS Last 

administered on 6/6/20at 15:15;  Start 4/27/20 at 13:45;  Stop 7/1/20 at 16:00; 

Status DC


Sodium Chloride 1,000 ml @  25 mls/hr Q24H IV  Last administered on 5/26/20at 

13:37;  Start 4/27/20 at 13:37;  Stop 5/29/20 at 13:09;  Status DC


Naloxone HCl (Narcan) 0.4 mg PRN Q2MIN  PRN IV SEE INSTRUCTIONS;  Start 4/27/20 

at 14:30;  Status UNV


Sodium Chloride 1,000 ml @  25 mls/hr Q24H IV ;  Start 4/27/20 at 14:30;  Status

UNV


Hydromorphone HCl 30 ml @ 0 mls/hr CONT PRN  PRN IV PER PROTOCOL Last administ

ered on 5/2/20at 16:08;  Start 4/27/20 at 14:30;  Stop 5/4/20 at 08:55;  Status 

DC


Potassium Acetate 55 meq/Magnesium Sulfate 20 meq/ Calcium Gluconate 10 meq/ 

Multivitamins 10 ml/Chromium/ Copper/Manganese/ Seleni/Zn 0.5 ml/ Insulin Human 

Regular 35 unit/ Total Parenteral Nutrition/Amino Acids/Dextrose/ Fat Emulsion 

Intravenous 1,920 ml @  80 mls/hr TPN  CONT IV  Last administered on 4/27/20at 

22:01;  Start 4/27/20 at 22:00;  Stop 4/28/20 at 21:59;  Status DC


Bumetanide (Bumex) 2 mg [id number] IV  Last administered on 5/1/20at 13:50;  Start 

4/28/20 at 14:00;  Stop 5/2/20 at 14:10;  Status DC


Meropenem 1 gm/ Sodium Chloride 100 ml @  200 mls/hr Q8HRS IV  Last administered

on 5/22/20at 05:53;  Start 4/28/20 at 14:00;  Stop 5/22/20 at 09:31;  Status DC


Potassium Acetate 55 meq/Magnesium Sulfate 20 meq/ Calcium Gluconate 10 meq/ 

Multivitamins 10 ml/Chromium/ Copper/Manganese/ Seleni/Zn 0.5 ml/ Insulin Human 

Regular 35 unit/ Total Parenteral Nutrition/Amino Acids/Dextrose/ Fat Emulsion 

Intravenous 1,920 ml @  80 mls/hr TPN  CONT IV  Last administered on 4/28/20at 

22:02;  Start 4/28/20 at 22:00;  Stop 4/29/20 at 21:59;  Status DC


Hydromorphone HCl (Dilaudid Standard PCA) 12 mg STK-MED ONCE IV ;  Start 4/27/20

at 14:35;  Stop 4/28/20 at 13:53;  Status DC


Artificial Tears (Artificial Tears) 1 drop PRN Q15MIN  PRN OU DRY EYE Last 

administered on 6/23/20at 21:17;  Start 4/29/20 at 05:30


Hydromorphone HCl (Dilaudid Standard PCA) 12 mg STK-MED ONCE IV ;  Start 4/28/20

at 12:05;  Stop 4/29/20 at 09:15;  Status DC


Potassium Acetate 65 meq/Magnesium Sulfate 20 meq/ Calcium Gluconate 10 meq/ 

Multivitamins 10 ml/Chromium/ Copper/Manganese/ Seleni/Zn 0.5 ml/ Insulin Human 

Regular 30 unit/ Total Parenteral Nutrition/Amino Acids/Dextrose/ Fat Emulsion 

Intravenous 1,920 ml @  80 mls/hr TPN  CONT IV  Last administered on 4/29/20at 

22:22;  Start 4/29/20 at 22:00;  Stop 4/30/20 at 21:59;  Status DC


Cyclobenzaprine HCl (Flexeril) 10 mg PRN Q6HRS  PRN PO MUSCLE SPASMS Last 

administered on 8/8/20at 20:50;  Start 4/30/20 at 10:45


Potassium Acetate 55 meq/Magnesium Sulfate 20 meq/ Calcium Gluconate 10 meq/ 

Multivitamins 10 ml/Chromium/ Copper/Manganese/ Seleni/Zn 0.5 ml/ Insulin Human 

Regular 30 unit/ Total Parenteral Nutrition/Amino Acids/Dextrose/ Fat Emulsion 

Intravenous 1,920 ml @  80 mls/hr TPN  CONT IV  Last administered on 5/1/20at 

01:00;  Start 4/30/20 at 22:00;  Stop 5/1/20 at 21:59;  Status DC


Magnesium Sulfate 50 ml @ 25 mls/hr 1X  ONCE IV  Last administered on 4/30/20at 

17:18;  Start 4/30/20 at 12:45;  Stop 4/30/20 at 14:44;  Status DC


Potassium Chloride/Water 100 ml @  100 mls/hr 1X  ONCE IV  Last administered on 

5/1/20at 11:27;  Start 5/1/20 at 12:00;  Stop 5/1/20 at 12:59;  Status DC


Hydromorphone HCl (Dilaudid Standard PCA) 12 mg STK-MED ONCE IV ;  Start 4/29/20

at 10:50;  Stop 5/1/20 at 11:02;  Status DC


Hydromorphone HCl (Dilaudid Standard PCA) 12 mg STK-MED ONCE IV ;  Start 4/30/20

at 13:47;  Stop 5/1/20 at 11:03;  Status DC


Potassium Acetate 30 meq/Magnesium Sulfate 20 meq/ Calcium Gluconate 10 meq/ 

Multivitamins 10 ml/Chromium/ Copper/Manganese/ Seleni/Zn 0.5 ml/ Insulin Human 

Regular 30 unit/ Potassium Chloride 30 meq/ Total Parenteral Nutrition/Amino A

cids/Dextrose/ Fat Emulsion Intravenous 1,920 ml @  80 mls/hr TPN  CONT IV  Last

administered on 5/1/20at 22:34;  Start 5/1/20 at 22:00;  Stop 5/2/20 at 21:59;  

Status DC


Potassium Chloride/Water 100 ml @  100 mls/hr Q1H IV  Last administered on 

5/2/20at 13:05;  Start 5/2/20 at 07:00;  Stop 5/2/20 at 10:59;  Status DC


Magnesium Sulfate 50 ml @ 25 mls/hr 1X  ONCE IV  Last administered on 5/2/20at 

10:34;  Start 5/2/20 at 10:30;  Stop 5/2/20 at 12:29;  Status DC


Potassium Chloride 75 meq/ Magnesium Sulfate 20 meq/Calcium Gluconate 10 meq/ 

Multivitamins 10 ml/Chromium/ Copper/Manganese/ Seleni/Zn 0.5 ml/ Insulin Human 

Regular 30 unit/ Total Parenteral Nutrition/Amino Acids/Dextrose/ Fat Emulsion 

Intravenous 1,920 ml @  80 mls/hr TPN  CONT IV  Last administered on 5/2/20at 

21:51;  Start 5/2/20 at 22:00;  Stop 5/3/20 at 22:00;  Status DC


Potassium Chloride 75 meq/ Magnesium Sulfate 20 meq/Calcium Gluconate 10 meq/ 

Multivitamins 10 ml/Chromium/ Copper/Manganese/ Seleni/Zn 0.5 ml/ Insulin Human 

Regular 25 unit/ Total Parenteral Nutrition/Amino Acids/Dextrose/ Fat Emulsion 

Intravenous 1,920 ml @  80 mls/hr TPN  CONT IV  Last administered on 5/3/20at 

22:04;  Start 5/3/20 at 22:00;  Stop 5/4/20 at 21:59;  Status DC


Hydromorphone HCl (Dilaudid) 0.4 mg PRN Q4HRS  PRN IVP PAIN Last administered on

5/4/20at 10:57;  Start 5/4/20 at 09:00;  Stop 5/4/20 at 18:59;  Status DC


Micafungin Sodium 100 mg/Dextrose 100 ml @  100 mls/hr Q24H IV  Last 

administered on 5/26/20at 12:17;  Start 5/4/20 at 11:00;  Stop 5/27/20 at 09:59;

 Status DC


Daptomycin 485 mg/ Sodium Chloride 50 ml @  100 mls/hr Q24H IV  Last 

administered on 5/11/20at 13:10;  Start 5/4/20 at 11:00;  Stop 5/12/20 at 07:44;

 Status DC


Potassium Chloride 75 meq/ Magnesium Sulfate 15 meq/Calcium Gluconate 8 meq/ 

Multivitamins 10 ml/Chromium/ Copper/Manganese/ Seleni/Zn 0.5 ml/ Insulin Human 

Regular 25 unit/ Total Parenteral Nutrition/Amino Acids/Dextrose/ Fat Emulsion 

Intravenous 1,920 ml @  80 mls/hr TPN  CONT IV  Last administered on 5/4/20at 

23:08;  Start 5/4/20 at 22:00;  Stop 5/5/20 at 21:59;  Status DC


Haloperidol Lactate (Haldol Inj) 3 mg 1X  ONCE IVP  Last administered on 

5/4/20at 14:37;  Start 5/4/20 at 14:30;  Stop 5/4/20 at 14:31;  Status DC


Hydromorphone HCl (Dilaudid) 1 mg PRN Q4HRS  PRN IVP PAIN Last administered on 

5/18/20at 06:25;  Start 5/4/20 at 19:00;  Stop 5/18/20 at 17:10;  Status DC


Potassium Chloride 75 meq/ Magnesium Sulfate 15 meq/Calcium Gluconate 8 meq/ 

Multivitamins 10 ml/Chromium/ Copper/Manganese/ Seleni/Zn 0.5 ml/ Insulin Human 

Regular 20 unit/ Total Parenteral Nutrition/Amino Acids/Dextrose/ Fat Emulsion 

Intravenous 1,920 ml @  80 mls/hr TPN  CONT IV  Last administered on 5/5/20at 

22:10;  Start 5/5/20 at 22:00;  Stop 5/6/20 at 21:59;  Status DC


Lidocaine HCl (Buffered Lidocaine 1%) 3 ml STK-MED ONCE .ROUTE ;  Start 5/6/20 

at 11:31;  Stop 5/6/20 at 11:31;  Status DC


Lidocaine HCl (Buffered Lidocaine 1%) 3 ml STK-MED ONCE .ROUTE ;  Start 5/6/20 

at 12:28;  Stop 5/6/20 at 12:29;  Status DC


Lidocaine HCl (Buffered Lidocaine 1%) 6 ml 1X  ONCE INJ  Last administered on 

5/6/20at 12:53;  Start 5/6/20 at 12:45;  Stop 5/6/20 at 12:46;  Status DC


Potassium Chloride 75 meq/ Magnesium Sulfate 15 meq/Calcium Gluconate 8 meq/ 

Multivitamins 10 ml/Chromium/ Copper/Manganese/ Seleni/Zn 0.5 ml/ Insulin Human 

Regular 20 unit/ Total Parenteral Nutrition/Amino Acids/Dextrose/ Fat Emulsion 

Intravenous 1,920 ml @  80 mls/hr TPN  CONT IV  Last administered on 5/6/20at 

22:00;  Start 5/6/20 at 22:00;  Stop 5/7/20 at 21:59;  Status DC


Potassium Chloride 75 meq/ Magnesium Sulfate 15 meq/Calcium Gluconate 8 meq/ 

Multivitamins 10 ml/Chromium/ Copper/Manganese/ Seleni/Zn 0.5 ml/ Insulin Human 

Regular 15 unit/ Total Parenteral Nutrition/Amino Acids/Dextrose/ Fat Emulsion 

Intravenous 1,920 ml @  80 mls/hr TPN  CONT IV  Last administered on 5/7/20at 

22:28;  Start 5/7/20 at 22:00;  Stop 5/8/20 at 21:59;  Status DC


Vecuronium Bromide (Norcuron Bolus) 6 mg PRN Q6HRS  PRN IV VENT ASYNCHRONY;  

Start 5/7/20 at 19:15;  Stop 5/7/20 at 19:35;  Status DC


Bumetanide (Bumex) 2 mg 1X  ONCE IV  Last administered on 5/7/20at 22:09;  Start

5/7/20 at 19:45;  Stop 5/7/20 at 19:46;  Status DC


Lidocaine HCl (Buffered Lidocaine 1%) 3 ml STK-MED ONCE .ROUTE ;  Start 5/8/20 

at 07:59;  Stop 5/8/20 at 07:59;  Status DC


Midazolam HCl (Versed) 5 mg STK-MED ONCE .ROUTE ;  Start 5/8/20 at 08:36;  Stop 

5/8/20 at 08:36;  Status DC


Fentanyl Citrate (Fentanyl 5ml Vial) 250 mcg STK-MED ONCE .ROUTE ;  Start 5/8/20

at 08:36;  Stop 5/8/20 at 08:37;  Status DC


Lidocaine HCl (Buffered Lidocaine 1%) 3 ml 1X  ONCE IJ  Last administered on 

5/8/20at 09:30;  Start 5/8/20 at 09:15;  Stop 5/8/20 at 09:16;  Status DC


Midazolam HCl (Versed) 5 mg 1X  ONCE IV  Last administered on 5/8/20at 09:30;  

Start 5/8/20 at 09:15;  Stop 5/8/20 at 09:16;  Status DC


Fentanyl Citrate (Fentanyl 5ml Vial) 250 mcg 1X  ONCE IV  Last administered on 

5/8/20at 09:30;  Start 5/8/20 at 09:15;  Stop 5/8/20 at 09:16;  Status DC


Bumetanide (Bumex) 2 mg DAILY IV  Last administered on 5/18/20at 08:07;  Start 

5/8/20 at 10:00;  Stop 5/18/20 at 17:15;  Status DC


Potassium Chloride 75 meq/ Magnesium Sulfate 15 meq/ Multivitamins 10 

ml/Chromium/ Copper/Manganese/ Seleni/Zn 0.5 ml/ Insulin Human Regular 15 unit/ 

Total Parenteral Nutrition/Amino Acids/Dextrose/ Fat Emulsion Intravenous 1,920 

ml @  80 mls/hr TPN  CONT IV  Last administered on 5/8/20at 21:59;  Start 5/8/20

at 22:00;  Stop 5/9/20 at 21:59;  Status DC


Metoclopramide HCl (Reglan Vial) 10 mg PRN Q3HRS  PRN IVP NAUSEA/VOMITING-3rd 

choice Last administered on 5/14/20at 04:25;  Start 5/9/20 at 16:45


Potassium Chloride 75 meq/ Magnesium Sulfate 15 meq/ Multivitamins 10 

ml/Chromium/ Copper/Manganese/ Seleni/Zn 0.5 ml/ Insulin Human Regular 15 unit/ 

Total Parenteral Nutrition/Amino Acids/Dextrose/ Fat Emulsion Intravenous 1,920 

ml @  80 mls/hr TPN  CONT IV  Last administered on 5/9/20at 22:41;  Start 5/9/20

at 22:00;  Stop 5/10/20 at 21:59;  Status DC


Magnesium Sulfate 50 ml @ 25 mls/hr 1X  ONCE IV  Last administered on 5/10/20at 

10:44;  Start 5/10/20 at 09:00;  Stop 5/10/20 at 10:59;  Status DC


Potassium Chloride/Water 100 ml @  100 mls/hr 1X  ONCE IV  Last administered on 

5/10/20at 09:37;  Start 5/10/20 at 09:00;  Stop 5/10/20 at 09:59;  Status DC


Duloxetine HCl (Cymbalta) 30 mg DAILY PO  Last administered on 5/11/20at 09:48; 

Start 5/10/20 at 14:00;  Stop 5/13/20 at 10:25;  Status DC


Potassium Chloride 80 meq/ Magnesium Sulfate 20 meq/ Multivitamins 10 

ml/Chromium/ Copper/Manganese/ Seleni/Zn 0.5 ml/ Insulin Human Regular 15 unit/ 

Total Parenteral Nutrition/Amino Acids/Dextrose/ Fat Emulsion Intravenous 1,920 

ml @  80 mls/hr TPN  CONT IV  Last administered on 5/10/20at 21:42;  Start 

5/10/20 at 22:00;  Stop 5/11/20 at 21:59;  Status DC


Potassium Chloride 80 meq/ Magnesium Sulfate 20 meq/ Multivitamins 10 

ml/Chromium/ Copper/Manganese/ Seleni/Zn 0.5 ml/ Insulin Human Regular 15 unit/ 

Total Parenteral Nutrition/Amino Acids/Dextrose/ Fat Emulsion Intravenous 1,920 

ml @  80 mls/hr TPN  CONT IV  Last administered on 5/11/20at 22:20;  Start 

5/11/20 at 22:00;  Stop 5/12/20 at 21:59;  Status DC


Lidocaine HCl (Buffered Lidocaine 1%) 3 ml STK-MED ONCE .ROUTE ;  Start 5/12/20 

at 09:54;  Stop 5/12/20 at 09:55;  Status DC


Hydromorphone HCl (Dilaudid Standard PCA) 12 mg STK-MED ONCE IV ;  Start 5/1/20 

at 15:50;  Stop 5/12/20 at 11:24;  Status DC


Potassium Chloride 80 meq/ Magnesium Sulfate 20 meq/ Multivitamins 10 

ml/Chromium/ Copper/Manganese/ Seleni/Zn 0.5 ml/ Insulin Human Regular 15 unit/ 

Total Parenteral Nutrition/Amino Acids/Dextrose/ Fat Emulsion Intravenous 1,920 

ml @  80 mls/hr TPN  CONT IV  Last administered on 5/12/20at 21:40;  Start 

5/12/20 at 22:00;  Stop 5/13/20 at 21:59;  Status DC


Lidocaine HCl (Buffered Lidocaine 1%) 6 ml 1X  ONCE INJ  Last administered on 

5/12/20at 14:15;  Start 5/12/20 at 14:15;  Stop 5/12/20 at 14:16;  Status DC


Potassium Chloride 80 meq/ Magnesium Sulfate 20 meq/ Multivitamins 10 

ml/Chromium/ Copper/Manganese/ Seleni/Zn 1 ml/ Insulin Human Regular 15 unit/ 

Total Parenteral Nutrition/Amino Acids/Dextrose/ Fat Emulsion Intravenous 1,920 

ml @  80 mls/hr TPN  CONT IV  Last administered on 5/13/20at 22:04;  Start 

5/13/20 at 22:00;  Stop 5/14/20 at 21:59;  Status DC


Potassium Chloride/Water 100 ml @  100 mls/hr 1X  ONCE IV  Last administered on 

5/14/20at 11:34;  Start 5/14/20 at 11:00;  Stop 5/14/20 at 11:59;  Status DC


Potassium Chloride 90 meq/ Magnesium Sulfate 20 meq/ Multivitamins 10 

ml/Chromium/ Copper/Manganese/ Seleni/Zn 1 ml/ Insulin Human Regular 15 unit/ 

Total Parenteral Nutrition/Amino Acids/Dextrose/ Fat Emulsion Intravenous 1,920 

ml @  80 mls/hr TPN  CONT IV  Last administered on 5/14/20at 22:57;  Start 

5/14/20 at 22:00;  Stop 5/15/20 at 21:59;  Status DC


Potassium Chloride 90 meq/ Magnesium Sulfate 20 meq/ Multivitamins 10 ml/

Chromium/ Copper/Manganese/ Seleni/Zn 1 ml/ Insulin Human Regular 15 unit/ Total

Parenteral Nutrition/Amino Acids/Dextrose/ Fat Emulsion Intravenous 1,920 ml @  

80 mls/hr TPN  CONT IV  Last administered on 5/15/20at 22:48;  Start 5/15/20 at 

22:00;  Stop 5/16/20 at 21:59;  Status DC


Potassium Chloride 90 meq/ Magnesium Sulfate 20 meq/ Multivitamins 10 

ml/Chromium/ Copper/Manganese/ Seleni/Zn 1 ml/ Insulin Human Regular 15 unit/ 

Total Parenteral Nutrition/Amino Acids/Dextrose/ Fat Emulsion Intravenous 1,890 

ml @  78.75 mls/ hr TPN  CONT IV  Last administered on 5/16/20at 22:15;  Start 

5/16/20 at 22:00;  Stop 5/17/20 at 21:59;  Status DC


Linezolid/Dextrose 300 ml @  300 mls/hr Q12HR IV  Last administered on 5/19/20at

21:08;  Start 5/17/20 at 09:00;  Stop 5/20/20 at 08:11;  Status DC


Daptomycin 450 mg/ Sodium Chloride 50 ml @  100 mls/hr Q24H IV  Last 

administered on 5/20/20at 09:25;  Start 5/17/20 at 09:00;  Stop 5/21/20 at 

08:30;  Status DC


Potassium Chloride 90 meq/ Magnesium Sulfate 20 meq/ Multivitamins 10 m

l/Chromium/ Copper/Manganese/ Seleni/Zn 1 ml/ Insulin Human Regular 15 unit/ 

Total Parenteral Nutrition/Amino Acids/Dextrose/ Fat Emulsion Intravenous 1,890 

ml @  78.75 mls/ hr TPN  CONT IV  Last administered on 5/17/20at 21:34;  Start 

5/17/20 at 22:00;  Stop 5/18/20 at 21:59;  Status DC


Lorazepam (Ativan Inj) 2 mg STK-MED ONCE .ROUTE ;  Start 5/17/20 at 14:58;  Stop

5/17/20 at 14:58;  Status DC


Metoprolol Tartrate (Lopressor Vial) 5 mg 1X  ONCE IVP  Last administered on 

5/17/20at 15:31;  Start 5/17/20 at 15:15;  Stop 5/17/20 at 15:16;  Status DC


Lorazepam (Ativan Inj) 2 mg 1X  ONCE IVP  Last administered on 5/17/20at 15:30; 

Start 5/17/20 at 15:15;  Stop 5/17/20 at 15:16;  Status DC


Enoxaparin Sodium (Lovenox 40mg Syringe) 40 mg Q24H SQ  Last administered on 

6/5/20at 17:44;  Start 5/17/20 at 17:00;  Stop 6/7/20 at 06:50;  Status DC


Lorazepam (Ativan Inj) 1 mg PRN Q4HRS  PRN IVP ANXIETY / AGITATION MILD-MOD Last

administered on 5/31/20at 15:55;  Start 5/17/20 at 19:15;  Stop 6/2/20 at 11:45;

 Status DC


Lorazepam (Ativan Inj) 2 mg PRN Q4HRS  PRN IVP ANXIETY / AGITATION SEVERE Last 

administered on 6/1/20at 07:55;  Start 5/17/20 at 19:15;  Stop 6/2/20 at 11:45; 

Status DC


Fentanyl Citrate (Fentanyl 2ml Vial) 50 mcg PRN Q4HRS  PRN IVP SEVERE PAIN Last 

administered on 6/13/20at 05:15;  Start 5/18/20 at 13:15;  Stop 6/14/20 at 

09:29;  Status DC


Fentanyl Citrate (Fentanyl 2ml Vial) 25 mcg PRN Q4HRS  PRN IVP MODERATE PAIN 

Last administered on 6/13/20at 00:27;  Start 5/18/20 at 13:15;  Stop 6/14/20 at 

09:30;  Status DC


Potassium Chloride 90 meq/ Magnesium Sulfate 20 meq/ Multivitamins 10 

ml/Chromium/ Copper/Manganese/ Seleni/Zn 1 ml/ Insulin Human Regular 15 unit/ 

Total Parenteral Nutrition/Amino Acids/Dextrose/ Fat Emulsion Intravenous 1,890 

ml @  78.75 mls/ hr TPN  CONT IV  Last administered on 5/18/20at 22:18;  Start 

5/18/20 at 22:00;  Stop 5/19/20 at 21:59;  Status DC


Furosemide (Lasix) 40 mg 1X  ONCE IVP  Last administered on 5/18/20at 21:51;  

Start 5/18/20 at 21:45;  Stop 5/18/20 at 21:48;  Status DC


Albumin Human 100 ml @  100 mls/hr 1X PRN  PRN IV SEE COMMENTS;  Start 5/19/20 

at 01:30;  Stop 8/3/20 at 09:52;  Status DC


Furosemide (Lasix) 40 mg [id number] IVP  Last administered on 6/3/20at 08:04;  Start 

5/19/20 at 14:00;  Stop 6/3/20 at 13:07;  Status DC


Potassium Chloride 90 meq/ Magnesium Sulfate 20 meq/ Multivitamins 10 

ml/Chromium/ Copper/Manganese/ Seleni/Zn 1 ml/ Insulin Human Regular 15 unit/ 

Total Parenteral Nutrition/Amino Acids/Dextrose/ Fat Emulsion Intravenous 1,800 

ml @  75 mls/hr TPN  CONT IV  Last administered on 5/19/20at 22:31;  Start 

5/19/20 at 22:00;  Stop 5/20/20 at 21:59;  Status DC


Potassium Chloride 90 meq/ Magnesium Sulfate 20 meq/ Multivitamins 10 

ml/Chromium/ Copper/Manganese/ Seleni/Zn 1 ml/ Insulin Human Regular 15 unit/ 

Total Parenteral Nutrition/Amino Acids/Dextrose/ Fat Emulsion Intravenous 1,800 

ml @  75 mls/hr TPN  CONT IV  Last administered on 5/20/20at 22:28;  Start 

5/20/20 at 22:00;  Stop 5/21/20 at 21:59;  Status DC


Potassium Chloride 110 meq/ Magnesium Sulfate 20 meq/ Multivitamins 10 m

l/Chromium/ Copper/Manganese/ Seleni/Zn 1 ml/ Insulin Human Regular 15 unit/ 

Total Parenteral Nutrition/Amino Acids/Dextrose/ Fat Emulsion Intravenous 1,800 

ml @  75 mls/hr TPN  CONT IV  Last administered on 5/21/20at 22:01;  Start 

5/21/20 at 22:00;  Stop 5/22/20 at 21:59;  Status DC


Saliva Substitute (Biotene Moisturizing Mouth) 2 spray PRN Q15MIN  PRN PO DRY 

MOUTH;  Start 5/21/20 at 11:00


Potassium Chloride 110 meq/ Magnesium Sulfate 20 meq/ Multivitamins 10 

ml/Chromium/ Copper/Manganese/ Seleni/Zn 1 ml/ Insulin Human Regular 15 unit/ 

Total Parenteral Nutrition/Amino Acids/Dextrose/ Fat Emulsion Intravenous 1,800 

ml @  75 mls/hr TPN  CONT IV  Last administered on 5/22/20at 22:21;  Start 

5/22/20 at 22:00;  Stop 5/23/20 at 21:59;  Status DC


Potassium Chloride 110 meq/ Magnesium Sulfate 20 meq/ Multivitamins 10 

ml/Chromium/ Copper/Manganese/ Seleni/Zn 1 ml/ Insulin Human Regular 15 unit/ 

Total Parenteral Nutrition/Amino Acids/Dextrose/ Fat Emulsion Intravenous 1,800 

ml @  75 mls/hr TPN  CONT IV  Last administered on 5/23/20at 22:04;  Start 

5/23/20 at 22:00;  Stop 5/24/20 at 21:59;  Status DC


Potassium Chloride 110 meq/ Magnesium Sulfate 20 meq/ Multivitamins 10 

ml/Chromium/ Copper/Manganese/ Seleni/Zn 1 ml/ Insulin Human Regular 15 unit/ 

Total Parenteral Nutrition/Amino Acids/Dextrose/ Fat Emulsion Intravenous 1,800 

ml @  75 mls/hr TPN  CONT IV  Last administered on 5/24/20at 22:48;  Start 

5/24/20 at 22:00;  Stop 5/25/20 at 21:59;  Status DC


Potassium Chloride 70 meq/ Magnesium Sulfate 20 meq/ Multivitamins 10 m

l/Chromium/ Copper/Manganese/ Seleni/Zn 1 ml/ Insulin Human Regular 15 unit/ 

Total Parenteral Nutrition/Amino Acids/Dextrose/ Fat Emulsion Intravenous 1,800 

ml @  75 mls/hr TPN  CONT IV  Last administered on 5/25/20at 21:39;  Start 

5/25/20 at 22:00;  Stop 5/26/20 at 21:59;  Status DC


Meropenem 500 mg/ Sodium Chloride 50 ml @  100 mls/hr Q6HRS IV  Last 

administered on 5/27/20at 06:02;  Start 5/25/20 at 18:00;  Stop 5/27/20 at 

09:59;  Status DC


Barium Sulfate (Varibar Thin Liquid Apple) 148 gm 1X  ONCE PO ;  Start 5/26/20 

at 11:45;  Stop 5/26/20 at 11:49;  Status DC


Potassium Chloride 70 meq/ Magnesium Sulfate 20 meq/ Multivitamins 10 

ml/Chromium/ Copper/Manganese/ Seleni/Zn 1 ml/ Insulin Human Regular 15 unit/ 

Total Parenteral Nutrition/Amino Acids/Dextrose/ Fat Emulsion Intravenous 1,800 

ml @  75 mls/hr TPN  CONT IV  Last administered on 5/26/20at 22:27;  Start 

5/26/20 at 22:00;  Stop 5/27/20 at 21:59;  Status DC


Piperacillin Sod/ Tazobactam Sod 3.375 gm/Sodium Chloride 50 ml @  100 mls/hr 

Q6HRS IV  Last administered on 6/4/20at 06:10;  Start 5/27/20 at 12:00;  Stop 

6/4/20 at 07:26;  Status DC


Potassium Chloride 70 meq/ Magnesium Sulfate 20 meq/ Multivitamins 10 

ml/Chromium/ Copper/Manganese/ Seleni/Zn 1 ml/ Insulin Human Regular 15 unit/ 

Total Parenteral Nutrition/Amino Acids/Dextrose/ Fat Emulsion Intravenous 1,800 

ml @  75 mls/hr TPN  CONT IV  Last administered on 5/27/20at 22:03;  Start 

5/27/20 at 22:00;  Stop 5/28/20 at 21:59;  Status DC


Potassium Chloride 70 meq/ Magnesium Sulfate 20 meq/ Multivitamins 10 

ml/Chromium/ Copper/Manganese/ Seleni/Zn 1 ml/ Insulin Human Regular 15 unit/ 

Total Parenteral Nutrition/Amino Acids/Dextrose/ Fat Emulsion Intravenous 1,800 

ml @  75 mls/hr TPN  CONT IV  Last administered on 5/28/20at 22:33;  Start 

5/28/20 at 22:00;  Stop 5/29/20 at 21:59;  Status DC


Potassium Chloride 70 meq/ Magnesium Sulfate 20 meq/ Multivitamins 10 

ml/Chromium/ Copper/Manganese/ Seleni/Zn 1 ml/ Insulin Human Regular 15 unit/ 

Total Parenteral Nutrition/Amino Acids/Dextrose/ Fat Emulsion Intravenous 1,800 

ml @  75 mls/hr TPN  CONT IV  Last administered on 5/29/20at 23:13;  Start 

5/29/20 at 22:00;  Stop 5/30/20 at 21:59;  Status DC


Potassium Chloride 80 meq/ Magnesium Sulfate 20 meq/ Multivitamins 10 

ml/Chromium/ Copper/Manganese/ Seleni/Zn 1 ml/ Insulin Human Regular 15 unit/ 

Total Parenteral Nutrition/Amino Acids/Dextrose/ Fat Emulsion Intravenous 1,800 

ml @  75 mls/hr TPN  CONT IV  Last administered on 5/30/20at 22:30;  Start 

5/30/20 at 22:00;  Stop 5/31/20 at 21:59;  Status DC


Potassium Chloride 80 meq/ Magnesium Sulfate 20 meq/ Multivitamins 10 

ml/Chromium/ Copper/Manganese/ Seleni/Zn 1 ml/ Insulin Human Regular 15 unit/ 

Total Parenteral Nutrition/Amino Acids/Dextrose/ Fat Emulsion Intravenous 1,800 

ml @  75 mls/hr TPN  CONT IV  Last administered on 5/31/20at 21:54;  Start 

5/31/20 at 22:00;  Stop 6/1/20 at 21:59;  Status DC


Potassium Chloride/Water 100 ml @  100 mls/hr 1X  ONCE IV  Last administered on 

6/1/20at 10:15;  Start 6/1/20 at 10:00;  Stop 6/1/20 at 10:59;  Status DC


Potassium Chloride 90 meq/ Magnesium Sulfate 20 meq/ Multivitamins 10 

ml/Chromium/ Copper/Manganese/ Seleni/Zn 1 ml/ Insulin Human Regular 20 unit/ 

Total Parenteral Nutrition/Amino Acids/Dextrose/ Fat Emulsion Intravenous 1,800 

ml @  75 mls/hr TPN  CONT IV  Last administered on 6/1/20at 22:28;  Start 6/1/20

at 22:00;  Stop 6/2/20 at 21:59;  Status DC


Potassium Chloride 90 meq/ Magnesium Sulfate 20 meq/ Multivitamins 10 

ml/Chromium/ Copper/Manganese/ Seleni/Zn 1 ml/ Insulin Human Regular 20 unit/ 

Total Parenteral Nutrition/Amino Acids/Dextrose/ Fat Emulsion Intravenous 1,800 

ml @  75 mls/hr TPN  CONT IV  Last administered on 6/2/20at 22:08;  Start 6/2/20

at 22:00;  Stop 6/3/20 at 21:59;  Status DC


Lorazepam (Ativan Inj) 0.25 mg PRN Q4HRS  PRN IVP ANXIETY / AGITATION Last 

administered on 8/11/20at 09:54;  Start 6/3/20 at 07:30


Potassium Chloride 90 meq/ Magnesium Sulfate 20 meq/ Multivitamins 10 

ml/Chromium/ Copper/Manganese/ Seleni/Zn 1 ml/ Insulin Human Regular 20 unit/ 

Total Parenteral Nutrition/Amino Acids/Dextrose/ Fat Emulsion Intravenous 1,800 

ml @  75 mls/hr TPN  CONT IV  Last administered on 6/3/20at 23:13;  Start 6/3/20

at 22:00;  Stop 6/4/20 at 21:59;  Status DC


Furosemide (Lasix) 40 mg DAILY IVP  Last administered on 6/5/20at 11:14;  Start 

6/3/20 at 13:30;  Stop 6/7/20 at 09:12;  Status DC


Fluoxetine HCl (PROzac) 20 mg QHS PEG  Last administered on 8/10/20at 20:26;  

Start 6/4/20 at 21:00


Fentanyl (Duragesic 50mcg/ Hr Patch) 1 patch Q72H TD  Last administered on 

6/4/20at 21:22;  Start 6/4/20 at 21:00;  Stop 6/13/20 at 12:00;  Status DC


Potassium Chloride 40 meq/ Potassium Acetate 60 meq/Magnesium Sulfate 10 meq/ 

Multivitamins 10 ml/Chromium/ Copper/Manganese/ Seleni/Zn 1 ml/ Insulin Human 

Regular 20 unit/ Total Parenteral Nutrition/Amino Acids/Dextrose/ Fat Emulsion 

Intravenous 1,800 ml @  75 mls/hr TPN  CONT IV  Last administered on 6/5/20at 

00:03;  Start 6/4/20 at 22:00;  Stop 6/5/20 at 21:59;  Status DC


Potassium Acetate 80 meq/Magnesium Sulfate 5 meq/ Multivitamins 10 ml/Chromium/ 

Copper/Manganese/ Seleni/Zn 1 ml/ Insulin Human Regular 20 unit/ Total 

Parenteral Nutrition/Amino Acids/Dextrose/ Fat Emulsion Intravenous 1,920 ml @  

80 mls/hr TPN  CONT IV  Last administered on 6/5/20at 21:59;  Start 6/5/20 at 

22:00;  Stop 6/6/20 at 21:59;  Status DC


Potassium Acetate 60 meq/Magnesium Sulfate 5 meq/ Multivitamins 10 ml/Chromium/ 

Copper/Manganese/ Seleni/Zn 1 ml/ Insulin Human Regular 30 unit/ Total 

Parenteral Nutrition/Amino Acids/Dextrose/ Fat Emulsion Intravenous 1,920 ml @  

80 mls/hr TPN  CONT IV  Last administered on 6/6/20at 21:54;  Start 6/6/20 at 

22:00;  Stop 6/7/20 at 21:59;  Status DC


Norepinephrine Bitartrate 8 mg/ Dextrose 258 ml @  13.332 mls/ hr CONT  PRN IV 

PER PROTOCOL Last administered on 7/2/20at 09:09;  Start 6/7/20 at 06:30;  Stop 

8/3/20 at 09:45;  Status DC


Albumin Human 500 ml @  125 mls/hr 1X  ONCE IV  Last administered on 6/7/20at 

08:10;  Start 6/7/20 at 08:15;  Stop 6/7/20 at 12:14;  Status DC


Potassium Acetate 40 meq/Magnesium Sulfate 5 meq/ Multivitamins 10 ml/Chromium/ 

Copper/Manganese/ Seleni/Zn 1 ml/ Insulin Human Regular 30 unit/ Total 

Parenteral Nutrition/Amino Acids/Dextrose/ Fat Emulsion Intravenous 1,920 ml @  

80 mls/hr TPN  CONT IV  Last administered on 6/7/20at 22:23;  Start 6/7/20 at 

22:00;  Stop 6/8/20 at 21:59;  Status DC


Meropenem 1 gm/ Sodium Chloride 100 ml @  200 mls/hr Q8HRS IV ;  Start 6/7/20 at

14:00;  Status Cancel


Meropenem 1 gm/ Sodium Chloride 100 ml @  200 mls/hr Q8HRS IV  Last administered

on 6/7/20at 11:04;  Start 6/7/20 at 10:00;  Stop 6/7/20 at 13:00;  Status DC


Meropenem 1 gm/ Sodium Chloride 100 ml @  200 mls/hr Q12HR IV  Last administered

on 6/25/20at 08:27;  Start 6/7/20 at 21:00;  Stop 6/25/20 at 08:56;  Status DC


Sodium Chloride 1,000 ml @  1,000 mls/hr 1X  ONCE IV  Last administered on 

6/7/20at 11:06;  Start 6/7/20 at 10:45;  Stop 6/7/20 at 11:44;  Status DC


Micafungin Sodium 100 mg/Dextrose 100 ml @  100 mls/hr Q24H IV  Last 

administered on 6/24/20at 12:34;  Start 6/7/20 at 11:00;  Stop 6/25/20 at 08:56;

 Status DC


Daptomycin 410 mg/ Sodium Chloride 50 ml @  100 mls/hr Q24H IV  Last 

administered on 6/9/20at 13:33;  Start 6/7/20 at 14:00;  Stop 6/10/20 at 08:30; 

Status DC


Midazolam HCl (Versed) 2 mg STK-MED ONCE .ROUTE ;  Start 6/7/20 at 14:47;  Stop 

6/7/20 at 14:48;  Status DC


Fentanyl Citrate (Fentanyl 2ml Vial) 100 mcg STK-MED ONCE .ROUTE ;  Start 6/7/20

at 14:47;  Stop 6/7/20 at 14:48;  Status DC


Flumazenil (Romazicon) 0.5 mg STK-MED ONCE IV ;  Start 6/7/20 at 14:48;  Stop 

6/7/20 at 14:48;  Status DC


Naloxone HCl (Narcan) 0.4 mg STK-MED ONCE .ROUTE ;  Start 6/7/20 at 14:48;  Stop

6/7/20 at 14:48;  Status DC


Lidocaine HCl (Lidocaine 1% 20ml Vial) 20 ml STK-MED ONCE .ROUTE ;  Start 6/7/20

at 14:48;  Stop 6/7/20 at 14:48;  Status DC


Midazolam HCl (Versed) 2 mg 1X  ONCE IV  Last administered on 6/7/20at 15:28;  

Start 6/7/20 at 15:00;  Stop 6/7/20 at 15:01;  Status DC


Fentanyl Citrate (Fentanyl 2ml Vial) 100 mcg 1X  ONCE IV  Last administered on 

6/7/20at 15:28;  Start 6/7/20 at 15:00;  Stop 6/7/20 at 15:01;  Status DC


Lidocaine HCl (Lidocaine 1% 20ml Vial) 20 ml 1X  ONCE INJ  Last administered on 

6/7/20at 15:30;  Start 6/7/20 at 15:00;  Stop 6/7/20 at 15:01;  Status DC


Sodium Chloride 1,000 ml @  100 mls/hr Q10H IV  Last administered on 6/16/20at 

07:30;  Start 6/7/20 at 20:00;  Stop 6/16/20 at 11:26;  Status DC


Sodium Bicarbonate (Sodium Bicarb Adult 8.4% Syr) 50 meq 1X  ONCE IV  Last 

administered on 6/7/20at 21:47;  Start 6/7/20 at 22:00;  Stop 6/7/20 at 22:01;  

Status DC


Potassium Acetate 40 meq/Magnesium Sulfate 5 meq/ Multivitamins 10 ml/Chromium/ 

Copper/Manganese/ Seleni/Zn 1 ml/ Insulin Human Regular 30 unit/ Total 

Parenteral Nutrition/Amino Acids/Dextrose/ Fat Emulsion Intravenous 1,920 ml @  

80 mls/hr TPN  CONT IV  Last administered on 6/8/20at 22:28;  Start 6/8/20 at 

22:00;  Stop 6/9/20 at 21:59;  Status DC


Sodium Chloride 500 ml @  500 mls/hr 1X  ONCE IV  Last administered on 6/9/20at 

06:39;  Start 6/9/20 at 06:45;  Stop 6/9/20 at 07:44;  Status DC


Potassium Acetate 40 meq/Magnesium Sulfate 5 meq/ Multivitamins 10 ml/Chromium/ 

Copper/Manganese/ Seleni/Zn 1 ml/ Insulin Human Regular 30 unit/ Total 

Parenteral Nutrition/Amino Acids/Dextrose/ Fat Emulsion Intravenous 1,920 ml @  

80 mls/hr TPN  CONT IV  Last administered on 6/9/20at 22:03;  Start 6/9/20 at 

22:00;  Stop 6/10/20 at 21:59;  Status DC


Metoprolol Tartrate (Lopressor Vial) 5 mg PRN Q6HRS  PRN IVP HYPERTENSION Last 

administered on 8/11/20at 10:02;  Start 6/10/20 at 09:00


Potassium Acetate 40 meq/Magnesium Sulfate 5 meq/ Multivitamins 10 ml/Chromium/ 

Copper/Manganese/ Seleni/Zn 1 ml/ Insulin Human Regular 30 unit/ Total Paren

teral Nutrition/Amino Acids/Dextrose/ Fat Emulsion Intravenous 1,920 ml @  80 

mls/hr TPN  CONT IV  Last administered on 6/10/20at 21:26;  Start 6/10/20 at 

22:00;  Stop 6/11/20 at 21:59;  Status DC


Potassium Acetate 40 meq/Magnesium Sulfate 5 meq/ Multivitamins 10 ml/Chromium/ 

Copper/Manganese/ Seleni/Zn 1 ml/ Insulin Human Regular 30 unit/ Total 

Parenteral Nutrition/Amino Acids/Dextrose/ Fat Emulsion Intravenous 1,920 ml @  

80 mls/hr TPN  CONT IV  Last administered on 6/11/20at 23:23;  Start 6/11/20 at 

22:00;  Stop 6/12/20 at 21:59;  Status DC


Potassium Acetate 40 meq/Magnesium Sulfate 5 meq/ Multivitamins 10 ml/Chromium/ 

Copper/Manganese/ Seleni/Zn 1 ml/ Insulin Human Regular 30 unit/ Total 

Parenteral Nutrition/Amino Acids/Dextrose/ Fat Emulsion Intravenous 1,920 ml @  

80 mls/hr TPN  CONT IV  Last administered on 6/12/20at 21:35;  Start 6/12/20 at 

22:00;  Stop 6/13/20 at 21:59;  Status DC


Furosemide (Lasix) 20 mg 1X  ONCE IVP  Last administered on 6/13/20at 06:26;  

Start 6/13/20 at 06:15;  Stop 6/13/20 at 06:16;  Status DC


Methylprednisolone Sodium Succinate (SOLU-Medrol 125MG VIAL) 125 mg 1X  ONCE IV 

Last administered on 6/13/20at 06:26;  Start 6/13/20 at 06:15;  Stop 6/13/20 at 

06:16;  Status DC


Albuterol/ Ipratropium (Duoneb) 3 ml Q4HRS NEB  Last administered on 8/11/20at 

08:11;  Start 6/13/20 at 08:00


Fentanyl Citrate 30 ml @ 0 mls/hr CONT  PRN IV SEE PROTOCOL Last administered on

7/4/20at 08:03;  Start 6/13/20 at 06:00;  Stop 7/4/20 at 12:42;  Status DC


Propofol 100 ml @ 0 mls/hr CONT  PRN IV SEE PROTOCOL Last administered on 

6/20/20at 23:50;  Start 6/13/20 at 06:00;  Stop 8/3/20 at 09:45;  Status DC


Fentanyl Citrate (Fentanyl 2ml Vial) 25 mcg PRN Q1HR  PRN IV SEE COMMENTS Last 

administered on 8/1/20at 23:56;  Start 6/13/20 at 06:00;  Stop 8/3/20 at 09:45; 

Status DC


Fentanyl Citrate (Fentanyl 2ml Vial) 50 mcg PRN Q1HR  PRN IV SEE COMMENTS Last 

administered on 8/3/20at 09:39;  Start 6/13/20 at 06:00;  Stop 8/3/20 at 09:45; 

Status DC


Chlorhexidine Gluconate (Peridex) 15 ml BID MM ;  Start 6/13/20 at 09:00;  Stop 

6/13/20 at 07:58;  Status DC


Potassium Acetate 40 meq/Magnesium Sulfate 5 meq/ Multivitamins 10 ml/Chromium/ 

Copper/Manganese/ Seleni/Zn 1 ml/ Insulin Human Regular 30 unit/ Total 

Parenteral Nutrition/Amino Acids/Dextrose/ Fat Emulsion Intravenous 1,920 ml @  

80 mls/hr TPN  CONT IV  Last administered on 6/13/20at 21:19;  Start 6/13/20 at 

22:00;  Stop 6/14/20 at 21:59;  Status DC


Acetylcysteine (Mucomyst 20% Resp Treatment) 600 mg BID NEB  Last administered 

on 6/19/20at 09:33;  Start 6/13/20 at 21:00;  Stop 6/19/20 at 10:39;  Status DC


Magnesium Sulfate 100 ml @  25 mls/hr 1X  ONCE IV  Last administered on 

6/13/20at 15:48;  Start 6/13/20 at 15:45;  Stop 6/13/20 at 19:44;  Status DC


Potassium Acetate 40 meq/Magnesium Sulfate 5 meq/ Multivitamins 10 ml/Chromium/ 

Copper/Manganese/ Seleni/Zn 1 ml/ Insulin Human Regular 30 unit/ Total 

Parenteral Nutrition/Amino Acids/Dextrose/ Fat Emulsion Intravenous 1,920 ml @  

80 mls/hr TPN  CONT IV  Last administered on 6/14/20at 21:35;  Start 6/14/20 at 

22:00;  Stop 6/15/20 at 21:59;  Status DC


Potassium Chloride/Water 100 ml @  100 mls/hr Q1H IV  Last administered on 

6/15/20at 08:31;  Start 6/15/20 at 07:00;  Stop 6/15/20 at 08:59;  Status DC


Potassium Acetate 40 meq/Magnesium Sulfate 5 meq/ Multivitamins 10 ml/Chromium/ 

Copper/Manganese/ Seleni/Zn 1 ml/ Insulin Human Regular 30 unit/ Total 

Parenteral Nutrition/Amino Acids/Dextrose/ Fat Emulsion Intravenous 1,920 ml @  

80 mls/hr TPN  CONT IV  Last administered on 6/15/20at 21:54;  Start 6/15/20 at 

22:00;  Stop 6/16/20 at 19:34;  Status DC


Lidocaine HCl (Buffered Lidocaine 1%) 3 ml STK-MED ONCE .ROUTE ;  Start 6/15/20 

at 12:14;  Stop 6/15/20 at 12:14;  Status DC


Lidocaine HCl (Buffered Lidocaine 1%) 3 ml 1X  ONCE IJ  Last administered on 

6/15/20at 13:11;  Start 6/15/20 at 13:00;  Stop 6/15/20 at 13:01;  Status DC


Magnesium Sulfate 50 ml @ 25 mls/hr 1X  ONCE IV ;  Start 6/16/20 at 08:15;  Stop

6/16/20 at 10:14;  Status DC


Potassium Acetate 40 meq/Magnesium Sulfate 10 meq/ Multivitamins 10 ml/Chromium/

Copper/Manganese/ Seleni/Zn 1 ml/ Insulin Human Regular 20 unit/ Total 

Parenteral Nutrition/Amino Acids/Dextrose/ Fat Emulsion Intravenous 1,920 ml @  

80 mls/hr TPN  CONT IV  Last administered on 6/16/20at 21:32;  Start 6/16/20 at 

22:00;  Stop 6/17/20 at 21:59;  Status DC


Potassium Chloride/Water 100 ml @  100 mls/hr Q1H IV  Last administered on 

6/17/20at 09:12;  Start 6/17/20 at 08:00;  Stop 6/17/20 at 09:59;  Status DC


Alteplase, Recombinant (Cathflo For Central Catheter Clearance) 4 mg 1X  ONCE 

INT CAT ;  Start 6/17/20 at 09:15;  Stop 6/17/20 at 09:16;  Status UNV


Alteplase, Recombinant (Cathflo For Central Catheter Clearance) 4 mg 1X  ONCE 

INT CAT ;  Start 6/17/20 at 09:15;  Stop 6/17/20 at 09:16;  Status UNV


Alteplase, Recombinant (Cathflo For Central Catheter Clearance) 4 mg 1X  ONCE 

INT CAT ;  Start 6/17/20 at 09:15;  Stop 6/17/20 at 09:16;  Status UNV


Alteplase, Recombinant 4 mg/ Sodium Chloride 20 ml @ 20 mls/hr 1X  ONCE IV  Last

administered on 6/17/20at 10:10;  Start 6/17/20 at 10:00;  Stop 6/17/20 at 

10:59;  Status DC


Alteplase, Recombinant 4 mg/ Sodium Chloride 20 ml @ 20 mls/hr 1X  ONCE IV  Last

administered on 6/17/20at 10:09;  Start 6/17/20 at 10:00;  Stop 6/17/20 at 

10:59;  Status DC


Alteplase, Recombinant 4 mg/ Sodium Chloride 20 ml @ 20 mls/hr 1X  ONCE IV  Last

administered on 6/17/20at 10:09;  Start 6/17/20 at 10:00;  Stop 6/17/20 at 

10:59;  Status DC


Potassium Acetate 60 meq/Magnesium Sulfate 10 meq/ Multivitamins 10 ml/Chromium/

Copper/Manganese/ Seleni/Zn 1 ml/ Insulin Human Regular 20 unit/ Total 

Parenteral Nutrition/Amino Acids/Dextrose/ Fat Emulsion Intravenous 1,920 ml @  

80 mls/hr TPN  CONT IV  Last administered on 6/17/20at 21:55;  Start 6/17/20 at 

22:00;  Stop 6/18/20 at 21:59;  Status DC


Albumin Human 500 ml @  125 mls/hr 1X  ONCE IV  Last administered on 6/18/20at 

12:01;  Start 6/18/20 at 11:15;  Stop 6/18/20 at 15:14;  Status DC


Sodium Chloride 500 ml @  500 mls/hr 1X  ONCE IV  Last administered on 6/18/20at

13:50;  Start 6/18/20 at 11:15;  Stop 6/18/20 at 12:14;  Status DC


Potassium Acetate 60 meq/Magnesium Sulfate 14 meq/ Multivitamins 10 ml/Chromium/

Copper/Manganese/ Seleni/Zn 1 ml/ Insulin Human Regular 20 unit/ Total Paren

teral Nutrition/Amino Acids/Dextrose/ Fat Emulsion Intravenous 1,920 ml @  80 

mls/hr TPN  CONT IV  Last administered on 6/18/20at 22:26;  Start 6/18/20 at 

22:00;  Stop 6/19/20 at 21:59;  Status DC


Ciprofloxacin/ Dextrose 200 ml @  200 mls/hr Q12HR IV  Last administered on 

6/25/20at 08:27;  Start 6/18/20 at 21:00;  Stop 6/25/20 at 08:56;  Status DC


Albumin Human 250 ml @  62.5 mls/hr 1X  ONCE IV  Last administered on 6/19/20at 

11:09;  Start 6/19/20 at 11:00;  Stop 6/19/20 at 14:59;  Status DC


Furosemide (Lasix) 20 mg 1X  ONCE IVP  Last administered on 6/19/20at 14:52;  

Start 6/19/20 at 10:45;  Stop 6/19/20 at 10:49;  Status DC


Potassium Acetate 60 meq/Magnesium Sulfate 14 meq/ Multivitamins 10 ml/Chromium/

Copper/Manganese/ Seleni/Zn 1 ml/ Insulin Human Regular 15 unit/ Total 

Parenteral Nutrition/Amino Acids/Dextrose/ Fat Emulsion Intravenous 1,920 ml @  

80 mls/hr TPN  CONT IV  Last administered on 6/19/20at 22:08;  Start 6/19/20 at 

22:00;  Stop 6/20/20 at 21:59;  Status DC


Potassium Acetate 60 meq/Magnesium Sulfate 14 meq/ Multivitamins 10 ml/Chromium/

Copper/Manganese/ Seleni/Zn 1 ml/ Insulin Human Regular 15 unit/ Total 

Parenteral Nutrition/Amino Acids/Dextrose/ Fat Emulsion Intravenous 1,920 ml @  

80 mls/hr TPN  CONT IV  Last administered on 6/20/20at 22:12;  Start 6/20/20 at 

22:00;  Stop 6/21/20 at 21:59;  Status DC


Potassium Acetate 60 meq/Magnesium Sulfate 14 meq/ Multivitamins 10 ml/Chromium/

Copper/Manganese/ Seleni/Zn 1 ml/ Insulin Human Regular 15 unit/ Total 

Parenteral Nutrition/Amino Acids/Dextrose/ Fat Emulsion Intravenous 1,920 ml @  

80 mls/hr TPN  CONT IV  Last administered on 6/21/20at 22:22;  Start 6/21/20 at 

22:00;  Stop 6/22/20 at 21:59;  Status DC


Furosemide (Lasix) 20 mg 1X  ONCE IVP  Last administered on 6/22/20at 11:07;  

Start 6/22/20 at 10:30;  Stop 6/22/20 at 10:34;  Status DC


Potassium Acetate 60 meq/Magnesium Sulfate 14 meq/ Multivitamins 10 ml/Chromium/

Copper/Manganese/ Seleni/Zn 1 ml/ Insulin Human Regular 15 unit/ Sodium Chloride

20 meq/Total Parenteral Nutrition/Amino Acids/Dextrose/ Fat Emulsion Intravenous

1,920 ml @  80 mls/hr TPN  CONT IV  Last administered on 6/22/20at 21:54;  Start

6/22/20 at 22:00;  Stop 6/23/20 at 21:59;  Status DC


Potassium Acetate 30 meq/Magnesium Sulfate 14 meq/ Multivitamins 10 ml/Chromium/

Copper/Manganese/ Seleni/Zn 1 ml/ Insulin Human Regular 15 unit/ Sodium Chloride

20 meq/Potassium Chloride 30 meq/ Total Parenteral Nutrition/Amino 

Acids/Dextrose/ Fat Emulsion Intravenous 1,920 ml @  80 mls/hr TPN  CONT IV  

Last administered on 6/23/20at 21:46;  Start 6/23/20 at 22:00;  Stop 6/24/20 at 

21:59;  Status DC


Sodium Chloride 80 meq/Potassium Chloride 30 meq/ Potassium Acetate 30 meq

/Magnesium Sulfate 14 meq/ Multivitamins 10 ml/Chromium/ Copper/Manganese/ 

Seleni/Zn 1 ml/ Insulin Human Regular 15 unit/ Total Parenteral Nutrition/Amino 

Acids/Dextrose/ Fat Emulsion Intravenous 1,920 ml @  80 mls/hr TPN  CONT IV  

Last administered on 6/24/20at 22:33;  Start 6/24/20 at 22:00;  Stop 6/25/20 at 

21:59;  Status DC


Furosemide (Lasix) 40 mg 1X  ONCE IVP  Last administered on 6/24/20at 16:27;  

Start 6/24/20 at 15:30;  Stop 6/24/20 at 15:33;  Status DC


Albumin Human 250 ml @  62.5 mls/hr 1X  ONCE IV  Last administered on 6/24/20at 

16:27;  Start 6/24/20 at 15:30;  Stop 6/24/20 at 19:29;  Status DC


Sodium Chloride 80 meq/Potassium Chloride 30 meq/ Potassium Acetate 30 

meq/Magnesium Sulfate 14 meq/ Multivitamins 10 ml/Chromium/ Copper/Manganese/ 

Seleni/Zn 1 ml/ Insulin Human Regular 15 unit/ Total Parenteral Nutrition/Amino 

Acids/Dextrose/ Fat Emulsion Intravenous 1,920 ml @  80 mls/hr TPN  CONT IV  

Last administered on 6/25/20at 22:25;  Start 6/25/20 at 22:00;  Stop 6/26/20 at 

21:59;  Status DC


Sodium Chloride 80 meq/Potassium Chloride 30 meq/ Potassium Acetate 30 

meq/Magnesium Sulfate 14 meq/ Multivitamins 10 ml/Chromium/ Copper/Manganese/ 

Seleni/Zn 1 ml/ Insulin Human Regular 15 unit/ Total Parenteral Nutrition/Amino 

Acids/Dextrose/ Fat Emulsion Intravenous 1,920 ml @  80 mls/hr TPN  CONT IV  

Last administered on 6/26/20at 21:32;  Start 6/26/20 at 22:00;  Stop 6/27/20 at 

21:59;  Status DC


Sodium Chloride 80 meq/Potassium Chloride 30 meq/ Potassium Acetate 30 

meq/Magnesium Sulfate 14 meq/ Multivitamins 10 ml/Chromium/ Copper/Manganese/ 

Seleni/Zn 1 ml/ Insulin Human Regular 15 unit/ Total Parenteral Nutrition/Amino 

Acids/Dextrose/ Fat Emulsion Intravenous 1,920 ml @  80 mls/hr TPN  CONT IV  

Last administered on 6/27/20at 21:53;  Start 6/27/20 at 22:00;  Stop 6/28/20 at 

21:59;  Status DC


Acetylcysteine (Mucomyst 20% Resp Treatment) 600 mg RTBID NEB  Last administered

on 8/11/20at 08:00;  Start 6/27/20 at 12:00


Sodium Chloride 80 meq/Potassium Chloride 30 meq/ Potassium Acetate 30 

meq/Magnesium Sulfate 14 meq/ Multivitamins 10 ml/Chromium/ Copper/Manganese/ 

Seleni/Zn 1 ml/ Insulin Human Regular 15 unit/ Total Parenteral Nutrition/Amino 

Acids/Dextrose/ Fat Emulsion Intravenous 1,920 ml @  80 mls/hr TPN  CONT IV  

Last administered on 6/28/20at 22:06;  Start 6/28/20 at 22:00;  Stop 6/29/20 at 

21:59;  Status DC


Meropenem 500 mg/ Sodium Chloride 50 ml @  100 mls/hr Q6HRS IV  Last administ

ered on 7/21/20at 06:15;  Start 6/28/20 at 18:00;  Stop 7/21/20 at 08:23;  

Status DC


Daptomycin 500 mg/ Sodium Chloride 50 ml @  100 mls/hr Q24H IV  Last 

administered on 7/6/20at 21:47;  Start 6/28/20 at 19:00;  Stop 7/7/20 at 08:13; 

Status DC


Sodium Chloride 80 meq/Potassium Chloride 30 meq/ Potassium Acetate 30 

meq/Magnesium Sulfate 14 meq/ Multivitamins 10 ml/Chromium/ Copper/Manganese/ 

Seleni/Zn 1 ml/ Insulin Human Regular 15 unit/ Total Parenteral Nutrition/Amino 

Acids/Dextrose/ Fat Emulsion Intravenous 1,920 ml @  80 mls/hr TPN  CONT IV  

Last administered on 6/29/20at 22:09;  Start 6/29/20 at 22:00;  Stop 6/30/20 at 

21:59;  Status DC


Heparin Sodium (Porcine) 1000 unit/Sodium Chloride 1,001 ml @  1,001 mls/hr 1X  

ONCE IRR ;  Start 6/30/20 at 06:00;  Stop 6/30/20 at 06:59;  Status DC


Propofol (Diprivan) 200 mg STK-MED ONCE IV ;  Start 6/30/20 at 07:44;  Stop 

6/30/20 at 07:44;  Status DC


Lidocaine HCl (Lidocaine Pf 2% Vial) 5 ml STK-MED ONCE .ROUTE ;  Start 6/30/20 

at 07:44;  Stop 6/30/20 at 07:44;  Status DC


Fentanyl Citrate (Fentanyl 2ml Vial) 100 mcg STK-MED ONCE .ROUTE ;  Start 

6/30/20 at 07:44;  Stop 6/30/20 at 07:44;  Status DC


Rocuronium Bromide (Zemuron) 100 mg STK-MED ONCE .ROUTE ;  Start 6/30/20 at 

07:44;  Stop 6/30/20 at 07:44;  Status DC


Micafungin Sodium 100 mg/Dextrose 100 ml @  100 mls/hr Q24H IV  Last 

administered on 8/4/20at 09:30;  Start 6/30/20 at 08:30;  Stop 8/5/20 at 08:20; 

Status DC


Bupivacaine HCl/ Epinephrine Bitart (Sensorcain-Epi 0.5%-1:[id number] Mpf) 30 ml 

STK-MED ONCE .ROUTE ;  Start 6/30/20 at 08:34;  Stop 6/30/20 at 08:35;  Status 

DC


Iohexol (Omnipaque 300 Mg/ml) 50 ml STK-MED ONCE .ROUTE  Last administered on 

6/30/20at 13:30;  Start 6/30/20 at 08:35;  Stop 6/30/20 at 08:35;  Status DC


Sodium Chloride 80 meq/Potassium Chloride 30 meq/ Potassium Acetate 30 

meq/Magnesium Sulfate 14 meq/ Multivitamins 10 ml/Chromium/ Copper/Manganese/ 

Seleni/Zn 1 ml/ Insulin Human Regular 15 unit/ Total Parenteral Nutrition/Amino 

Acids/Dextrose/ Fat Emulsion Intravenous 1,920 ml @  80 mls/hr TPN  CONT IV  

Last administered on 7/1/20at 01:22;  Start 6/30/20 at 22:00;  Stop 7/1/20 at 

21:59;  Status DC


Phenylephrine HCl (Ken-Synephrine Inj) 10 mg STK-MED ONCE .ROUTE ;  Start 

6/30/20 at 10:15;  Stop 6/30/20 at 10:15;  Status DC


Desflurane (Suprane) 90 ml STK-MED ONCE IH ;  Start 6/30/20 at 10:18;  Stop 

6/30/20 at 10:19;  Status DC


Albumin Human 500 ml @  As Directed STK-MED ONCE IV ;  Start 6/30/20 at 11:06;  

Stop 6/30/20 at 11:06;  Status DC


Vasopressin (Vasostrict) 20 unit STK-MED ONCE .ROUTE ;  Start 6/30/20 at 12:23; 

Stop 6/30/20 at 12:23;  Status DC


Phenylephrine HCl (Ken-Synephrine Inj) 10 mg STK-MED ONCE .ROUTE ;  Start 6/3

0/20 at 13:33;  Stop 6/30/20 at 13:33;  Status DC


Phenylephrine HCl (Ken-Synephrine Inj) 10 mg STK-MED ONCE .ROUTE ;  Start 6/30 /20 at 13:33;  Stop 6/30/20 at 13:33;  Status DC


Ondansetron HCl (Zofran) 4 mg STK-MED ONCE .ROUTE ;  Start 6/30/20 at 13:33;  S

top 6/30/20 at 13:33;  Status DC


Enoxaparin Sodium (Lovenox 40mg Syringe) 40 mg Q24H SQ  Last administered on 

8/11/20at 10:09;  Start 7/1/20 at 08:00


Sodium Chloride (Normal Saline Flush) 3 ml QSHIFT  PRN IV AFTER MEDS AND BLOOD 

DRAWS;  Start 6/30/20 at 14:45;  Stop 8/3/20 at 09:45;  Status DC


Naloxone HCl (Narcan) 0.4 mg PRN Q2MIN  PRN IV SEE INSTRUCTIONS;  Start 6/30/20 

at 14:45;  Stop 8/3/20 at 09:45;  Status DC


Sodium Chloride 1,000 ml @  25 mls/hr Q24H IV  Last administered on 8/2/20at 

20:55;  Start 6/30/20 at 14:33;  Stop 8/3/20 at 09:45;  Status DC


Morphine Sulfate (Morphine Sulfate) 1 mg PRN Q1HR  PRN IV PAIN Last administered

on 7/25/20at 18:28;  Start 6/30/20 at 14:45;  Stop 8/3/20 at 09:45;  Status DC


Midazolam HCl 100 mg/Sodium Chloride 100 ml @ 1 mls/hr CONT  PRN IV SEE I/O 

RECORD Last administered on 7/3/20at 18:48;  Start 6/30/20 at 14:45;  Stop 

8/3/20 at 09:45;  Status DC


Phenylephrine HCl (PHENYLEPHRINE in 0.9% NACL PF) 1 mg STK-MED ONCE IV ;  Start 

6/30/20 at 14:44;  Stop 6/30/20 at 14:45;  Status DC


Ephedrine Sulfate (ePHEDrine PF IN SALINE SYRINGE) 50 mg STK-MED ONCE IV ;  

Start 6/30/20 at 14:45;  Stop 6/30/20 at 14:45;  Status DC


Vasopressin 20 unit/Dextrose 101 ml @  12 mls/hr CONT  PRN IV SEE I/O RECORD 

Last administered on 7/7/20at 04:17;  Start 6/30/20 at 15:30;  Stop 8/3/20 at 

09:45;  Status DC


Sodium Chloride 1,000 ml @  1,000 mls/hr 1X  ONCE IV  Last administered on 

6/30/20at 15:42;  Start 6/30/20 at 15:45;  Stop 6/30/20 at 16:44;  Status DC


Albumin Human 500 ml @  125 mls/hr 1X  ONCE IV ;  Start 6/30/20 at 16:00;  Stop 

6/30/20 at 19:59;  Status DC


Albumin Human 500 ml @  125 mls/hr PRN Q1HR  PRN IV PER PROTOCOL;  Start 6/30/20

at 15:45;  Stop 8/3/20 at 09:52;  Status DC


Magnesium Sulfate 50 ml @ 25 mls/hr 1X  ONCE IV  Last administered on 6/30/20at 

17:02;  Start 6/30/20 at 16:30;  Stop 6/30/20 at 18:29;  Status DC


Sodium Bicarbonate (Sodium Bicarb Adult 8.4% Syr) 50 meq STK-MED ONCE .ROUTE ;  

Start 6/30/20 at 16:20;  Stop 6/30/20 at 16:20;  Status DC


Sodium Bicarbonate (Sodium Bicarb Adult 8.4% Syr) 100 meq 1X  ONCE IV  Last 

administered on 6/30/20at 17:07;  Start 6/30/20 at 16:30;  Stop 6/30/20 at 

16:31;  Status DC


Sodium Bicarbonate 150 meq/Dextrose 1,150 ml @  75 mls/hr 1X  ONCE IV  Last 

administered on 6/30/20at 20:02;  Start 6/30/20 at 16:30;  Stop 7/1/20 at 07:49;

 Status DC


Sodium Chloride 80 meq/Potassium Chloride 30 meq/ Potassium Acetate 30 

meq/Magnesium Sulfate 14 meq/ Multivitamins 10 ml/Chromium/ Copper/Manganese/ 

Seleni/Zn 1 ml/ Insulin Human Regular 15 unit/ Total Parenteral Nutrition/Amino 

Acids/Dextrose/ Fat Emulsion Intravenous 1,920 ml @  80 mls/hr TPN  CONT IV  

Last administered on 7/1/20at 23:05;  Start 7/1/20 at 22:00;  Stop 7/2/20 at 

21:59;  Status DC


Sodium Chloride 100 meq/Potassium Chloride 30 meq/ Potassium Acetate 30 

meq/Magnesium Sulfate 12 meq/ Multivitamins 10 ml/Chromium/ Copper/Manganese/ 

Seleni/Zn 1 ml/ Insulin Human Regular 15 unit/ Total Parenteral Nutrition/Amino 

Acids/Dextrose/ Fat Emulsion Intravenous 1,920 ml @  80 mls/hr TPN  CONT IV  

Last administered on 7/2/20at 21:52;  Start 7/2/20 at 22:00;  Stop 7/3/20 at 

21:59;  Status DC


Sodium Chloride 100 meq/Potassium Chloride 30 meq/ Potassium Acetate 30 

meq/Magnesium Sulfate 12 meq/ Multivitamins 10 ml/Chromium/ Copper/Manganese/ 

Seleni/Zn 1 ml/ Insulin Human Regular 15 unit/ Total Parenteral Nutrition/Amino 

Acids/Dextrose/ Fat Emulsion Intravenous 1,920 ml @  80 mls/hr TPN  CONT IV  

Last administered on 7/3/20at 21:46;  Start 7/3/20 at 22:00;  Stop 7/4/20 at 

21:59;  Status DC


Sodium Chloride 100 meq/Potassium Chloride 30 meq/ Potassium Acetate 30 

meq/Magnesium Sulfate 12 meq/ Multivitamins 10 ml/Chromium/ Copper/Manganese/ 

Seleni/Zn 1 ml/ Insulin Human Regular 15 unit/ Total Parenteral Nutrition/Amino 

Acids/Dextrose/ Fat Emulsion Intravenous 1,800 ml @  75 mls/hr TPN  CONT IV  

Last administered on 7/4/20at 22:04;  Start 7/4/20 at 22:00;  Stop 7/5/20 at 

21:59;  Status DC


Fentanyl Citrate 55 ml @ 0 mls/hr CONT  PRN IV SEE COMMENTS Last administered on

7/6/20at 23:55;  Start 7/4/20 at 13:00;  Stop 7/9/20 at 17:28;  Status DC


Sodium Chloride 100 meq/Potassium Chloride 30 meq/ Potassium Acetate 30 

meq/Magnesium Sulfate 12 meq/ Multivitamins 10 ml/Chromium/ Copper/Manganese/ 

Seleni/Zn 1 ml/ Insulin Human Regular 15 unit/ Total Parenteral Nutrition/Amino 

Acids/Dextrose/ Fat Emulsion Intravenous 1,680 ml @  70 mls/hr TPN  CONT IV  

Last administered on 7/5/20at 21:23;  Start 7/5/20 at 22:00;  Stop 7/6/20 at 

21:59;  Status DC


Sodium Chloride 110 meq/Potassium Chloride 30 meq/ Potassium Acetate 30 

meq/Magnesium Sulfate 15 meq/ Multivitamins 10 ml/Chromium/ Copper/Manganese/ 

Seleni/Zn 1 ml/ Insulin Human Regular 15 unit/ Total Parenteral Nutrition/Amino 

Acids/Dextrose/ Fat Emulsion Intravenous 1,680 ml @  70 mls/hr TPN  CONT IV  

Last administered on 7/6/20at 21:48;  Start 7/6/20 at 22:00;  Stop 7/7/20 at 

21:59;  Status DC


Sodium Chloride 110 meq/Potassium Chloride 30 meq/ Potassium Acetate 30 meq

/Magnesium Sulfate 15 meq/ Multivitamins 10 ml/Chromium/ Copper/Manganese/ 

Seleni/Zn 1 ml/ Insulin Human Regular 15 unit/ Total Parenteral Nutrition/Amino 

Acids/Dextrose/ Fat Emulsion Intravenous 1,680 ml @  70 mls/hr TPN  CONT IV  

Last administered on 7/7/20at 21:33;  Start 7/7/20 at 22:00;  Stop 7/8/20 at 

21:59;  Status DC


Sodium Chloride 110 meq/Potassium Chloride 30 meq/ Potassium Acetate 30 

meq/Magnesium Sulfate 15 meq/ Multivitamins 10 ml/Chromium/ Copper/Manganese/ 

Seleni/Zn 1 ml/ Insulin Human Regular 15 unit/ Total Parenteral Nutrition/Amino 

Acids/Dextrose/ Fat Emulsion Intravenous 1,680 ml @  70 mls/hr TPN  CONT IV  

Last administered on 7/8/20at 21:51;  Start 7/8/20 at 22:00;  Stop 7/9/20 at 

21:59;  Status DC


Sodium Chloride 90 meq/Potassium Chloride 30 meq/ Potassium Acetate 30 

meq/Magnesium Sulfate 15 meq/ Multivitamins 10 ml/Chromium/ Copper/Manganese/ 

Seleni/Zn 1 ml/ Insulin Human Regular 15 unit/ Total Parenteral Nutrition/Amino 

Acids/Dextrose/ Fat Emulsion Intravenous 1,680 ml @  70 mls/hr TPN  CONT IV  

Last administered on 7/9/20at 22:38;  Start 7/9/20 at 22:00;  Stop 7/10/20 at 

21:59;  Status DC


Fentanyl Citrate 30 ml @ 0 mls/hr CONT  PRN IV SEE I/O RECORD;  Start 7/9/20 at 

17:30;  Stop 8/3/20 at 09:45;  Status DC


Fentanyl (Duragesic 12mcg/ Hr Patch) 1 patch Q3DAYS TD  Last administered on 

8/9/20at 09:09;  Start 7/10/20 at 09:00


Sodium Chloride 90 meq/Potassium Chloride 30 meq/ Potassium Acetate 30 

meq/Magnesium Sulfate 15 meq/ Multivitamins 10 ml/Chromium/ Copper/Manganese/ 

Seleni/Zn 1 ml/ Insulin Human Regular 15 unit/ Total Parenteral Nutrition/Amino 

Acids/Dextrose/ Fat Emulsion Intravenous 1,680 ml @  70 mls/hr TPN  CONT IV  

Last administered on 7/10/20at 21:59;  Start 7/10/20 at 22:00;  Stop 7/11/20 at 

21:59;  Status DC


Sodium Chloride 90 meq/Potassium Chloride 30 meq/ Potassium Acetate 30 

meq/Magnesium Sulfate 15 meq/ Multivitamins 10 ml/Chromium/ Copper/Manganese/ 

Seleni/Zn 1 ml/ Insulin Human Regular 15 unit/ Total Parenteral Nutrition/Amino 

Acids/Dextrose/ Fat Emulsion Intravenous 1,680 ml @  70 mls/hr TPN  CONT IV  

Last administered on 7/11/20at 21:35;  Start 7/11/20 at 22:00;  Stop 7/12/20 at 

21:59;  Status DC


Vancomycin HCl (Vanco Per Pharmacy) 1 each PRN DAILY  PRN MC SEE COMMENTS Last 

administered on 7/14/20at 02:46;  Start 7/12/20 at 09:15;  Stop 7/15/20 at 

07:41;  Status DC


Ciprofloxacin/ Dextrose 200 ml @  200 mls/hr Q12HR IV  Last administered on 

7/20/20at 21:02;  Start 7/12/20 at 10:00;  Stop 7/21/20 at 08:20;  Status DC


Vancomycin HCl 2 gm/Sodium Chloride 500 ml @  250 mls/hr 1X  ONCE IV  Last 

administered on 7/12/20at 10:34;  Start 7/12/20 at 10:00;  Stop 7/12/20 at 

11:59;  Status DC


Sodium Chloride 90 meq/Potassium Chloride 30 meq/ Potassium Acetate 30 

meq/Magnesium Sulfate 15 meq/ Multivitamins 10 ml/Chromium/ Copper/Manganese/ 

Seleni/Zn 1 ml/ Insulin Human Regular 15 unit/ Total Parenteral Nutrition/Amino 

Acids/Dextrose/ Fat Emulsion Intravenous 1,680 ml @  70 mls/hr TPN  CONT IV  

Last administered on 7/12/20at 22:02;  Start 7/12/20 at 22:00;  Stop 7/13/20 at 

21:59;  Status DC


Diphenhydramine HCl (Benadryl) 25 mg 1X  ONCE IVP  Last administered on 

7/12/20at 14:26;  Start 7/12/20 at 14:30;  Stop 7/12/20 at 14:31;  Status DC


Vancomycin HCl 1.5 gm/Sodium Chloride 500 ml @  250 mls/hr Q8H IV  Last 

administered on 7/13/20at 03:08;  Start 7/12/20 at 18:30;  Stop 7/13/20 at 

12:24;  Status DC


Vancomycin HCl (Vancomycin Trough Level) 1 each 1X  ONCE MC  Last administered 

on 7/13/20at 10:00;  Start 7/13/20 at 10:00;  Stop 7/13/20 at 10:01;  Status DC


Sodium Chloride 90 meq/Potassium Chloride 30 meq/ Potassium Acetate 30 

meq/Magnesium Sulfate 15 meq/ Multivitamins 10 ml/Chromium/ Copper/Manganese/ 

Seleni/Zn 1 ml/ Insulin Human Regular 15 unit/ Total Parenteral Nutrition/Amino 

Acids/Dextrose/ Fat Emulsion Intravenous 1,680 ml @  70 mls/hr TPN  CONT IV  

Last administered on 7/13/20at 22:13;  Start 7/13/20 at 22:00;  Stop 7/14/20 at 

21:59;  Status DC


Vancomycin HCl (Vancomycin Random Level) 1 each 1X  ONCE MC  Last administered 

on 7/14/20at 01:00;  Start 7/14/20 at 01:00;  Stop 7/14/20 at 01:01;  Status DC


Vancomycin HCl 1.5 gm/Sodium Chloride 500 ml @  250 mls/hr Q12H IV  Last 

administered on 7/14/20at 22:07;  Start 7/14/20 at 10:00;  Stop 7/15/20 at 

07:41;  Status DC


Vancomycin HCl (Vancomycin Trough Level) 1 each 1X  ONCE MC ;  Start 7/15/20 at 

09:30;  Stop 7/15/20 at 09:31;  Status Cancel


Sodium Chloride 90 meq/Potassium Chloride 30 meq/ Potassium Acetate 30 meq/Mag

nesium Sulfate 15 meq/ Multivitamins 10 ml/Chromium/ Copper/Manganese/ Seleni/Zn

1 ml/ Insulin Human Regular 15 unit/ Total Parenteral Nutrition/Amino 

Acids/Dextrose/ Fat Emulsion Intravenous 1,680 ml @  70 mls/hr TPN  CONT IV  

Last administered on 7/14/20at 22:08;  Start 7/14/20 at 22:00;  Stop 7/15/20 at 

21:59;  Status DC


Alteplase, Recombinant (Cathflo For Central Catheter Clearance) 1 mg 1X  ONCE 

INT CAT  Last administered on 7/14/20at 11:49;  Start 7/14/20 at 11:00;  Stop 

7/14/20 at 11:01;  Status DC


Daptomycin 500 mg/ Sodium Chloride 50 ml @  100 mls/hr Q24H IV  Last 

administered on 7/24/20at 08:25;  Start 7/15/20 at 09:00;  Stop 7/24/20 at 

08:38;  Status DC


Sodium Chloride 90 meq/Potassium Chloride 30 meq/ Potassium Acetate 30 

meq/Magnesium Sulfate 15 meq/ Multivitamins 10 ml/Chromium/ Copper/Manganese/ 

Seleni/Zn 1 ml/ Insulin Human Regular 15 unit/ Total Parenteral Nutrition/Amino 

Acids/Dextrose/ Fat Emulsion Intravenous 1,680 ml @  70 mls/hr TPN  CONT IV  

Last administered on 7/15/20at 22:55;  Start 7/15/20 at 22:00;  Stop 7/16/20 at 

21:59;  Status DC


Sodium Chloride 90 meq/Potassium Chloride 30 meq/ Potassium Acetate 30 meq/

Magnesium Sulfate 15 meq/ Multivitamins 10 ml/Chromium/ Copper/Manganese/ 

Seleni/Zn 1 ml/ Insulin Human Regular 15 unit/ Total Parenteral Nutrition/Amino 

Acids/Dextrose/ Fat Emulsion Intravenous 1,680 ml @  70 mls/hr TPN  CONT IV  

Last administered on 7/16/20at 22:06;  Start 7/16/20 at 22:00;  Stop 7/17/20 at 

21:59;  Status DC


Diphenhydramine HCl (Benadryl) 50 mg STK-MED ONCE .ROUTE ;  Start 7/16/20 at 

18:34;  Stop 7/16/20 at 18:35;  Status DC


Diphenhydramine HCl (Benadryl) 25 mg 1X  ONCE IM ;  Start 7/16/20 at 18:45;  

Stop 7/16/20 at 18:46;  Status DC


Diphenhydramine HCl (Benadryl) 25 mg 1X  ONCE IVP  Last administered on 

7/16/20at 18:56;  Start 7/16/20 at 19:00;  Stop 7/16/20 at 19:01;  Status DC


Alprazolam (Xanax) 0.5 mg PRN TID  PRN PO ANXIETY / AGITATION Last administered 

on 7/23/20at 11:49;  Start 7/17/20 at 08:00;  Stop 7/23/20 at 15:54;  Status DC


Sodium Chloride 110 meq/Potassium Chloride 30 meq/ Potassium Acetate 30 

meq/Magnesium Sulfate 15 meq/ Multivitamins 10 ml/Chromium/ Copper/Manganese/ 

Seleni/Zn 1 ml/ Insulin Human Regular 15 unit/ Total Parenteral Nutrition/Amino 

Acids/Dextrose/ Fat Emulsion Intravenous 1,680 ml @  70 mls/hr TPN  CONT IV  

Last administered on 7/17/20at 21:21;  Start 7/17/20 at 22:00;  Stop 7/18/20 at 

21:59;  Status DC


Sodium Chloride 110 meq/Potassium Chloride 30 meq/ Potassium Acetate 30 

meq/Magnesium Sulfate 15 meq/ Multivitamins 10 ml/Chromium/ Copper/Manganese/ 

Seleni/Zn 1 ml/ Insulin Human Regular 15 unit/ Total Parenteral Nutrition/Amino 

Acids/Dextrose/ Fat Emulsion Intravenous 1,680 ml @  70 mls/hr TPN  CONT IV  

Last administered on 7/18/20at 22:01;  Start 7/18/20 at 22:00;  Stop 7/19/20 at 

21:59;  Status DC


Alteplase, Recombinant (Cathflo For Central Catheter Clearance) 1 mg 1X  ONCE 

INT CAT  Last administered on 7/19/20at 08:23;  Start 7/19/20 at 08:15;  Stop 

7/19/20 at 08:16;  Status DC


Sodium Chloride 110 meq/Sodium Phosphate 10 mmol/ Potassium Chloride 30 meq/ 

Potassium Acetate 30 meq/Magnesium Sulfate 15 meq/ Multivitamins 10 ml/Chromium/

Copper/Manganese/ Seleni/Zn 1 ml/ Insulin Human Regular 15 unit/ Total 

Parenteral Nutrition/Amino Acids/Dextrose/ Fat Emulsion Intravenous 1,680 ml @  

70 mls/hr TPN  CONT IV  Last administered on 7/19/20at 22:03;  Start 7/19/20 at 

22:00;  Stop 7/20/20 at 21:59;  Status DC


Sodium Chloride 120 meq/Sodium Phosphate 10 mmol/ Potassium Chloride 30 meq/ 

Potassium Acetate 30 meq/Magnesium Sulfate 15 meq/ Multivitamins 10 ml/Chromium/

Copper/Manganese/ Seleni/Zn 1 ml/ Insulin Human Regular 15 unit/ Total 

Parenteral Nutrition/Amino Acids/Dextrose/ Fat Emulsion Intravenous 1,680 ml @  

70 mls/hr TPN  CONT IV  Last administered on 7/20/20at 22:08;  Start 7/20/20 at 

22:00;  Stop 7/21/20 at 21:59;  Status DC


Ceftazidime/ Avibactam 2.5 gm/ Sodium Chloride 100 ml @  50 mls/hr Q8HRS IV  

Last administered on 7/22/20at 05:32;  Start 7/21/20 at 14:00;  Stop 7/22/20 at 

07:48;  Status DC


Alteplase, Recombinant (Cathflo For Central Catheter Clearance) 1 mg 1X  ONCE 

INT CAT  Last administered on 7/21/20at 09:30;  Start 7/21/20 at 09:30;  Stop 

7/21/20 at 09:31;  Status DC


Sodium Chloride 120 meq/Sodium Phosphate 10 mmol/ Potassium Chloride 30 meq/ 

Potassium Acetate 30 meq/Magnesium Sulfate 15 meq/ Multivitamins 10 ml/Chromium/

Copper/Manganese/ Seleni/Zn 1 ml/ Insulin Human Regular 15 unit/ Total 

Parenteral Nutrition/Amino Acids/Dextrose/ Fat Emulsion Intravenous 1,680 ml @  

70 mls/hr TPN  CONT IV  Last administered on 7/21/20at 22:11;  Start 7/21/20 at 

22:00;  Stop 7/22/20 at 21:59;  Status DC


Iohexol (Omnipaque 300 Mg/ml) 75 ml 1X  ONCE IV  Last administered on 7/21/20at 

11:30;  Start 7/21/20 at 11:30;  Stop 7/21/20 at 11:31;  Status DC


Info (CONTRAST GIVEN -- Rx MONITORING) 1 each PRN DAILY  PRN MC SEE COMMENTS;  

Start 7/21/20 at 11:45;  Stop 7/23/20 at 11:44;  Status DC


Alteplase, Recombinant (Cathflo For Central Catheter Clearance) 1 mg 1X  ONCE 

INT CAT  Last administered on 7/21/20at 12:17;  Start 7/21/20 at 12:00;  Stop 

7/21/20 at 12:01;  Status DC


Cefepime HCl (Maxipime) 2 gm Q12HR IVP  Last administered on 7/22/20at 20:53;  

Start 7/22/20 at 09:00;  Stop 7/23/20 at 07:30;  Status DC


Sodium Chloride 120 meq/Sodium Phosphate 10 mmol/ Potassium Chloride 30 meq/ 

Potassium Acetate 30 meq/Magnesium Sulfate 15 meq/ Multivitamins 10 ml/Chromium/

Copper/Manganese/ Seleni/Zn 1 ml/ Insulin Human Regular 15 unit/ Total 

Parenteral Nutrition/Amino Acids/Dextrose/ Fat Emulsion Intravenous 1,680 ml @  

70 mls/hr TPN  CONT IV  Last administered on 7/22/20at 21:25;  Start 7/22/20 at 

22:00;  Stop 7/23/20 at 21:59;  Status DC


Ceftazidime/ Avibactam 2.5 gm/ Sodium Chloride 250 ml @  125 mls/hr Q8HRS IV  

Last administered on 8/5/20at 05:38;  Start 7/23/20 at 08:00;  Stop 8/5/20 at 

08:20;  Status DC


Sodium Chloride 120 meq/Sodium Phosphate 10 mmol/ Potassium Chloride 30 meq/ 

Potassium Acetate 30 meq/Magnesium Sulfate 15 meq/ Multivitamins 10 ml/Chromium/

Copper/Manganese/ Seleni/Zn 1 ml/ Insulin Human Regular 15 unit/ Total 

Parenteral Nutrition/Amino Acids/Dextrose/ Fat Emulsion Intravenous 1,680 ml @  

70 mls/hr TPN  CONT IV  Last administered on 7/23/20at 22:35;  Start 7/23/20 at 

22:00;  Stop 7/24/20 at 21:59;  Status DC


Alprazolam (Xanax) 0.5 mg PRN QID  PRN PO ANXIETY / AGITATION Last administered 

on 8/11/20at 02:50;  Start 7/23/20 at 16:00


Acetaminophen/ Hydrocodone Bitart (Lortab 5/325) 1 tab PRN Q4HRS  PRN PO PAIN 

Last administered on 8/11/20at 06:53;  Start 7/23/20 at 16:00


Sodium Chloride 120 meq/Sodium Phosphate 10 mmol/ Potassium Chloride 30 meq/ Pot

assium Acetate 30 meq/Magnesium Sulfate 15 meq/ Multivitamins 10 ml/Chromium/ 

Copper/Manganese/ Seleni/Zn 1 ml/ Insulin Human Regular 15 unit/ Total 

Parenteral Nutrition/Amino Acids/Dextrose/ Fat Emulsion Intravenous 1,680 ml @  

70 mls/hr TPN  CONT IV  Last administered on 7/24/20at 21:50;  Start 7/24/20 at 

22:00;  Stop 7/25/20 at 21:59;  Status DC


Sodium Chloride 120 meq/Sodium Phosphate 10 mmol/ Potassium Chloride 30 meq/ 

Potassium Acetate 30 meq/Magnesium Sulfate 15 meq/ Multivitamins 10 ml/Chromium/

Copper/Manganese/ Seleni/Zn 1 ml/ Insulin Human Regular 15 unit/ Total 

Parenteral Nutrition/Amino Acids/Dextrose/ Fat Emulsion Intravenous 1,680 ml @  

70 mls/hr TPN  CONT IV  Last administered on 7/25/20at 22:14;  Start 7/25/20 at 

22:00;  Stop 7/26/20 at 21:59;  Status DC


Daptomycin 500 mg/ Sodium Chloride 50 ml @  100 mls/hr Q24H IV  Last 

administered on 8/4/20at 09:20;  Start 7/26/20 at 09:00;  Stop 8/5/20 at 08:20; 

Status DC


Sodium Chloride 120 meq/Sodium Phosphate 10 mmol/ Potassium Chloride 30 meq/ 

Potassium Acetate 30 meq/Magnesium Sulfate 15 meq/ Multivitamins 10 ml/Chromium/

Copper/Manganese/ Seleni/Zn 1 ml/ Insulin Human Regular 15 unit/ Total 

Parenteral Nutrition/Amino Acids/Dextrose/ Fat Emulsion Intravenous 1,680 ml @  

70 mls/hr TPN  CONT IV ;  Start 7/26/20 at 22:00;  Stop 7/27/20 at 21:59;  

Status Cancel


Amino Acids/ Glycerin/ Electrolytes 1,000 ml @  80 mls/hr [id number] IV  Last 

administered on 8/1/20at 18:10;  Start 7/26/20 at 10:15;  Stop 8/2/20 at 07:07; 

Status DC


Iohexol (Omnipaque 300 Mg/ml) 50 ml 1X  ONCE IJ ;  Start 7/28/20 at 11:00;  Stop

7/28/20 at 11:01;  Status DC


Sodium Chloride 500 ml @  500 mls/hr 1X  ONCE IV  Last administered on 7/28/20at

18:30;  Start 7/28/20 at 18:30;  Stop 7/28/20 at 19:29;  Status DC


Lidocaine HCl (Buffered Lidocaine 1%) 3 ml 1X  ONCE INJ ;  Start 7/30/20 at 

12:15;  Stop 7/30/20 at 12:17;  Status DC


Fentanyl Citrate (Fentanyl 2ml Vial) 25 mcg PRN Q6HRS  PRN IVP SEE INSTUCTIONS 

Last administered on 8/9/20at 06:00;  Start 8/3/20 at 10:00


Sodium Chloride 1,000 ml @  125 mls/hr Q8H IV  Last administered on 8/3/20at 

23:16;  Start 8/3/20 at 23:21;  Stop 8/4/20 at 09:23;  Status DC


Sodium Chloride 1,000 ml @  350 mls/hr 1X  ONCE IV  Last administered on 

8/3/20at 20:29;  Start 8/3/20 at 20:30;  Stop 8/3/20 at 23:21;  Status DC


Vitamin A/Vitamin D (Vitamin A & D Ointment) 1 thomas PRN Q1HR  PRN TP SKIN 

PROTECTION Last administered on 8/11/20at 10:44;  Start 8/4/20 at 09:15


Iohexol (Omnipaque 240 Mg/ml) 50 ml STK-MED ONCE .ROUTE ;  Start 8/4/20 at 

14:18;  Stop 8/4/20 at 14:19;  Status DC


Lidocaine HCl (Buffered Lidocaine 1%) 3 ml STK-MED ONCE .ROUTE ;  Start 8/4/20 

at 14:19;  Stop 8/4/20 at 14:19;  Status DC


Fentanyl Citrate (Fentanyl 2ml Vial) 100 mcg STK-MED ONCE .ROUTE ;  Start 8/4/20

at 15:03;  Stop 8/4/20 at 15:03;  Status DC


Lidocaine HCl (Buffered Lidocaine 1%) 5 ml 1X  ONCE INJ  Last administered on 

8/4/20at 15:00;  Start 8/4/20 at 15:45;  Stop 8/4/20 at 15:46;  Status DC


Iohexol (Omnipaque 240 Mg/ml) 10 ml 1X  ONCE IJ  Last administered on 8/4/20at 

15:00;  Start 8/4/20 at 15:45;  Stop 8/4/20 at 15:46;  Status DC


Multivitamins/ Minerals Therapeutic (Centrum Multivit-Mineral Liq) 5 ml DAILY 

PEG  Last administered on 8/11/20at 10:08;  Start 8/5/20 at 09:00


Alteplase, Recombinant 5 mg/ Sodium Chloride 30 ml @ 30 mls/hr 1X  PRN IV SEE 

COMMENTS;  Start 8/5/20 at 06:45;  Status UNV


Alteplase, Recombinant (Cathflo For Central Catheter Clearance) 1 mg 1X  ONCE 

INT CAT  Last administered on 8/5/20at 09:30;  Start 8/5/20 at 07:00;  Stop 

8/5/20 at 07:01;  Status DC


Sodium Chloride 1,000 ml @  125 mls/hr 1X  ONCE IV  Last administered on 

8/5/20at 16:12;  Start 8/5/20 at 14:30;  Stop 8/5/20 at 22:29;  Status DC


Furosemide (Lasix) 40 mg 1X  ONCE IVP  Last administered on 8/5/20at 16:17;  

Start 8/5/20 at 14:30;  Stop 8/5/20 at 14:33;  Status DC


Furosemide (Lasix) 40 mg [id number] IVP  Last administered on 8/6/20at 13:51;  Start 

8/6/20 at 09:00;  Stop 8/6/20 at 14:01;  Status DC


Sodium Chloride 1,000 ml @  125 mls/hr 1X  ONCE IV  Last administered on 

8/6/20at 08:20;  Start 8/6/20 at 07:45;  Stop 8/6/20 at 15:44;  Status DC


Calcitonin [place] (Miacalcin) 400 unit BID SQ  Last administered on 8/6/20at 

18:18;  Start 8/6/20 at 18:00;  Stop 8/7/20 at 15:56;  Status DC


Zoledronic Acid 100 ml @  400 mls/hr 1X  ONCE IV  Last administered on 8/7/20at 

13:04;  Start 8/7/20 at 12:00;  Stop 8/7/20 at 12:14;  Status DC


Metoprolol Tartrate (Lopressor Vial) 5 mg 1X  ONCE IVP  Last administered on 

8/9/20at 10:54;  Start 8/9/20 at 10:45;  Stop 8/9/20 at 10:46;  Status DC


Cefepime HCl (Maxipime) 2 gm Q12HR IVP  Last administered on 8/11/20at 09:43;  

Start 8/10/20 at 10:00


Metronidazole 100 ml @  100 mls/hr Q12HR IV  Last administered on 8/11/20at 

09:52;  Start 8/10/20 at 10:00


Sodium Chloride 1,000 ml @  75 mls/hr [id number] IV  Last administered on 8/11/20at

09:55;  Start 8/10/20 at 18:45





Active Scripts


Active


Reported


Bisoprolol Fumarate 5 Mg Tablet 10 Mg PO DAILY


Vitals/I & O





Vital Sign - Last 24 Hours








 8/10/20 8/10/20 8/10/20 8/10/20





 12:36 13:11 15:00 16:05


 


Temp   102.0 





   102.0 


 


Pulse 165  155 


 


Resp   58 


 


B/P (MAP) 116/54  101/56 (71) 


 


Pulse Ox  99 97 96


 


O2 Delivery  Nasal Cannula Nasal Cannula Room Air


 


O2 Flow Rate  4.0 5.0 


 


    





    





 8/10/20 8/10/20 8/10/20 8/10/20





 17:58 19:25 19:50 19:55


 


Temp 99.2  98.2 





 99.2  98.2 


 


Pulse   136 


 


Resp   40 


 


B/P (MAP)   100/67 (78) 


 


Pulse Ox  98 100 


 


O2 Delivery  Nasal Cannula Nasal Cannula Nasal Cannula


 


O2 Flow Rate  3.0 3.0 3.0


 


    





    





 8/10/20 8/11/20 8/11/20 8/11/20





 22:30 00:40 02:30 02:51


 


Temp 98.4  98.8 





 98.4  98.8 


 


Pulse 139  146 


 


Resp 44  40 38


 


B/P (MAP) 97/62 (74)  101/64 (76) 


 


Pulse Ox 98 100 99 100


 


O2 Delivery Nasal Cannula Nasal Cannula Nasal Cannula Nasal Cannula


 


O2 Flow Rate 3.0 3.0 3.0 3.0


 


    





    





 8/11/20 8/11/20 8/11/20 8/11/20





 03:30 03:51 06:53 07:00


 


Temp    97.3





    97.3


 


Pulse    145


 


Resp  38 40 40


 


B/P (MAP)    108/69 (82)


 


Pulse Ox 100 100 100 100


 


O2 Delivery Nasal Cannula Nasal Cannula Nasal Cannula Nasal Cannula


 


O2 Flow Rate 2.0 3.0 1.0 3.0


 


    





    





 8/11/20 8/11/20 8/11/20 





 08:14 10:02 11:00 


 


Temp   99.3 





   99.3 


 


Pulse  150 130 


 


Resp   40 


 


B/P (MAP)  116/60 108/57 (74) 


 


Pulse Ox 100  99 


 


O2 Delivery Nasal Cannula  Nasal Cannula 


 


O2 Flow Rate 2.0  3.0 














Intake and Output   


 


 8/10/20 8/10/20 8/11/20





 15:00 23:00 07:00


 


Intake Total 200 ml 300 ml 1380 ml


 


Output Total  1025 ml 750 ml


 


Balance 200 ml -725 ml 630 ml











Justicifation of Admission Dx:


Justifications for Admission:


Justification of Admission Dx:  Yes











MG ARGUETA MD                 Aug 11, 2020 12:31

## 2020-08-12 VITALS — DIASTOLIC BLOOD PRESSURE: 60 MMHG | SYSTOLIC BLOOD PRESSURE: 104 MMHG

## 2020-08-12 VITALS — DIASTOLIC BLOOD PRESSURE: 49 MMHG | SYSTOLIC BLOOD PRESSURE: 94 MMHG

## 2020-08-12 VITALS — DIASTOLIC BLOOD PRESSURE: 57 MMHG | SYSTOLIC BLOOD PRESSURE: 118 MMHG

## 2020-08-12 VITALS — SYSTOLIC BLOOD PRESSURE: 103 MMHG | DIASTOLIC BLOOD PRESSURE: 56 MMHG

## 2020-08-12 VITALS — SYSTOLIC BLOOD PRESSURE: 103 MMHG | DIASTOLIC BLOOD PRESSURE: 64 MMHG

## 2020-08-12 VITALS — SYSTOLIC BLOOD PRESSURE: 104 MMHG | DIASTOLIC BLOOD PRESSURE: 61 MMHG

## 2020-08-12 LAB
ALBUMIN SERPL-MCNC: 1.3 G/DL (ref 3.4–5)
ALBUMIN/GLOB SERPL: 0.4 {RATIO} (ref 1–1.7)
ALP SERPL-CCNC: 86 U/L (ref 46–116)
ALT SERPL-CCNC: 8 U/L (ref 14–59)
ANION GAP SERPL CALC-SCNC: 9 MMOL/L (ref 6–14)
AST SERPL-CCNC: 20 U/L (ref 15–37)
BASOPHILS # BLD AUTO: 0 X10^3/UL (ref 0–0.2)
BASOPHILS NFR BLD: 0 % (ref 0–3)
BILIRUB SERPL-MCNC: 0.2 MG/DL (ref 0.2–1)
BUN SERPL-MCNC: 76 MG/DL (ref 7–20)
BUN/CREAT SERPL: 36 (ref 6–20)
CALCIUM SERPL-MCNC: 7.4 MG/DL (ref 8.5–10.1)
CHLORIDE SERPL-SCNC: 117 MMOL/L (ref 98–107)
CO2 SERPL-SCNC: 26 MMOL/L (ref 21–32)
CREAT SERPL-MCNC: 2.1 MG/DL (ref 0.6–1)
EOSINOPHIL NFR BLD: 0.2 X10^3/UL (ref 0–0.7)
EOSINOPHIL NFR BLD: 1 % (ref 0–3)
ERYTHROCYTE [DISTWIDTH] IN BLOOD BY AUTOMATED COUNT: 19.6 % (ref 11.5–14.5)
GFR SERPLBLD BASED ON 1.73 SQ M-ARVRAT: 25 ML/MIN
GLOBULIN SER-MCNC: 3.7 G/DL (ref 2.2–3.8)
GLUCOSE SERPL-MCNC: 171 MG/DL (ref 70–99)
HCT VFR BLD CALC: 23.7 % (ref 36–47)
HGB BLD-MCNC: 7.3 G/DL (ref 12–15.5)
LYMPHOCYTES # BLD: 0.9 X10^3/UL (ref 1–4.8)
LYMPHOCYTES NFR BLD AUTO: 7 % (ref 24–48)
MCH RBC QN AUTO: 28 PG (ref 25–35)
MCHC RBC AUTO-ENTMCNC: 31 G/DL (ref 31–37)
MCV RBC AUTO: 90 FL (ref 79–100)
MONO #: 0.7 X10^3/UL (ref 0–1.1)
MONOCYTES NFR BLD: 5 % (ref 0–9)
NEUT #: 12 X10^3/UL (ref 1.8–7.7)
NEUTROPHILS NFR BLD AUTO: 87 % (ref 31–73)
PLATELET # BLD AUTO: 397 X10^3/UL (ref 140–400)
POTASSIUM SERPL-SCNC: 4.6 MMOL/L (ref 3.5–5.1)
PROT SERPL-MCNC: 5 G/DL (ref 6.4–8.2)
RBC # BLD AUTO: 2.64 X10^6/UL (ref 3.5–5.4)
SODIUM SERPL-SCNC: 152 MMOL/L (ref 136–145)
WBC # BLD AUTO: 13.8 X10^3/UL (ref 4–11)

## 2020-08-12 RX ADMIN — HYDROCODONE BITARTRATE AND ACETAMINOPHEN PRN TAB: 5; 325 TABLET ORAL at 20:49

## 2020-08-12 RX ADMIN — IPRATROPIUM BROMIDE AND ALBUTEROL SULFATE SCH ML: .5; 3 SOLUTION RESPIRATORY (INHALATION) at 03:39

## 2020-08-12 RX ADMIN — INSULIN LISPRO SCH UNITS: 100 INJECTION, SOLUTION INTRAVENOUS; SUBCUTANEOUS at 17:43

## 2020-08-12 RX ADMIN — ACETYLCYSTEINE SCH MG: 200 INHALANT RESPIRATORY (INHALATION) at 19:32

## 2020-08-12 RX ADMIN — CEFEPIME SCH GM: 2 INJECTION, POWDER, FOR SOLUTION INTRAVENOUS at 08:29

## 2020-08-12 RX ADMIN — SODIUM CHLORIDE, SODIUM LACTATE, POTASSIUM CHLORIDE, AND CALCIUM CHLORIDE SCH MLS/HR: .6; .31; .03; .02 INJECTION, SOLUTION INTRAVENOUS at 13:55

## 2020-08-12 RX ADMIN — IPRATROPIUM BROMIDE AND ALBUTEROL SULFATE SCH ML: .5; 3 SOLUTION RESPIRATORY (INHALATION) at 19:32

## 2020-08-12 RX ADMIN — IPRATROPIUM BROMIDE AND ALBUTEROL SULFATE SCH ML: .5; 3 SOLUTION RESPIRATORY (INHALATION) at 12:15

## 2020-08-12 RX ADMIN — MULTIVITAMIN SCH ML: LIQUID ORAL at 08:28

## 2020-08-12 RX ADMIN — INSULIN LISPRO SCH UNITS: 100 INJECTION, SOLUTION INTRAVENOUS; SUBCUTANEOUS at 00:00

## 2020-08-12 RX ADMIN — HYDROCODONE BITARTRATE AND ACETAMINOPHEN PRN TAB: 5; 325 TABLET ORAL at 12:42

## 2020-08-12 RX ADMIN — HYDROCODONE BITARTRATE AND ACETAMINOPHEN PRN TAB: 5; 325 TABLET ORAL at 01:40

## 2020-08-12 RX ADMIN — CEFEPIME SCH GM: 2 INJECTION, POWDER, FOR SOLUTION INTRAVENOUS at 20:49

## 2020-08-12 RX ADMIN — IPRATROPIUM BROMIDE AND ALBUTEROL SULFATE SCH ML: .5; 3 SOLUTION RESPIRATORY (INHALATION) at 16:26

## 2020-08-12 RX ADMIN — PANTOPRAZOLE SODIUM SCH MG: 40 INJECTION, POWDER, FOR SOLUTION INTRAVENOUS at 08:28

## 2020-08-12 RX ADMIN — FENTANYL SCH PATCH: 12.5 PATCH TRANSDERMAL at 08:28

## 2020-08-12 RX ADMIN — INSULIN LISPRO SCH UNITS: 100 INJECTION, SOLUTION INTRAVENOUS; SUBCUTANEOUS at 12:00

## 2020-08-12 RX ADMIN — ACETYLCYSTEINE SCH MG: 200 INHALANT RESPIRATORY (INHALATION) at 08:46

## 2020-08-12 RX ADMIN — ENOXAPARIN SODIUM SCH MG: 40 INJECTION SUBCUTANEOUS at 08:28

## 2020-08-12 RX ADMIN — INSULIN LISPRO SCH UNITS: 100 INJECTION, SOLUTION INTRAVENOUS; SUBCUTANEOUS at 06:00

## 2020-08-12 RX ADMIN — SODIUM CHLORIDE, SODIUM LACTATE, POTASSIUM CHLORIDE, AND CALCIUM CHLORIDE SCH MLS/HR: .6; .31; .03; .02 INJECTION, SOLUTION INTRAVENOUS at 20:48

## 2020-08-12 RX ADMIN — ONDANSETRON PRN MG: 2 INJECTION INTRAMUSCULAR; INTRAVENOUS at 12:43

## 2020-08-12 RX ADMIN — SODIUM CHLORIDE, SODIUM LACTATE, POTASSIUM CHLORIDE, AND CALCIUM CHLORIDE SCH MLS/HR: .6; .31; .03; .02 INJECTION, SOLUTION INTRAVENOUS at 03:32

## 2020-08-12 RX ADMIN — IPRATROPIUM BROMIDE AND ALBUTEROL SULFATE SCH ML: .5; 3 SOLUTION RESPIRATORY (INHALATION) at 08:46

## 2020-08-12 NOTE — NUR
Entered room to assess patient after wound care completed drain change on right sight.  
Noticed output from G tube appears to be the color and consistency of tube feeding that 
patient is receiving. Pgd and spoke to surgeon regarding G tube output.  Was advised to 
attempt to pull g tube until resistance is met then secure the stopper to be sure its flush 
against patients skin.  When this RN and nurse in training attempted to perform task, it was 
noted that stopper/stat lock is sutured to patients skin therefore we were unable to 
reposition tube.  Will continue to monitor

## 2020-08-12 NOTE — NUR
Wound/Ostomy Care/ assessment:

Follow up visit for multiple surgical incisional wounds and drain removal sites. Drain sites 
to L lateral abdomen healed, covered with hydrocolloid dressings for protection. Midline 
incision well approximated, healed, and left PAYTON. 3 drain removal sites to R lateral abdomen 
remain open. Inferior and superior openings are not draining, light pink epithelial tisse 
and minimal soft  yellow slough. Covered with Aqacel AG and foams. central opening continues 
to drain copious amounts of clear  brown-green bile. Ostomy  ring to central periwound, 
urostomy bag connected to pepe drainage collection bag,and hydrocolloids. 



Treatment Recommendations/Plan:  

Continue to change urostomy as needed for seal management. 



Education provided: 

Assisted with turn to L side with pillows and  purple wedge. 



Offloading surface/device: 

[id number]



Follow up 8/18

## 2020-08-12 NOTE — PDOC
SURGICAL PROGRESS NOTE


DATE: 8/12/20 


TIME: 13:16


Subjective


Patient sitting up in a chair


Vital Signs





Vital Signs








  Date Time  Temp Pulse Resp B/P (MAP) Pulse Ox O2 Delivery O2 Flow Rate FiO2


 


8/12/20 12:42     100 Nasal Cannula 2.0 


 


8/12/20 11:00 97.9 146 34 118/57 (77)    





 97.9       








I&O











Intake and Output 


 


 8/12/20





 07:00


 


Intake Total 3452 ml


 


Output Total 1925 ml


 


Balance 1527 ml


 


 


 


Intake Oral 0 ml


 


IV Total 1100 ml


 


Tube Feeding 2352 ml


 


Output Urine Total 175 ml


 


Gastric Drainage Total 350 ml


 


Drainage Total 1400 ml


 


# Bowel Movements 5








PATIENT HAS A VILLASENOR:  Yes


General:  Cooperative, mild distress


Abdomen:  Normal bowel sounds, Soft, No tenderness


Labs





Laboratory Tests








Test


 8/10/20


18:38 8/11/20


00:25 8/11/20


05:30 8/11/20


05:34


 


Glucose (Fingerstick)


 210 mg/dL


(70-99) 185 mg/dL


(70-99) 


 190 mg/dL


(70-99)


 


Sodium Level


 


 


 147 mmol/L


(136-145) 





 


Potassium Level


 


 


 5.9 mmol/L


(3.5-5.1) 





 


Chloride Level


 


 


 113 mmol/L


() 





 


Carbon Dioxide Level


 


 


 25 mmol/L


(21-32) 





 


Anion Gap   9 (6-14)  


 


Blood Urea Nitrogen


 


 


 61 mg/dL


(7-20) 





 


Creatinine


 


 


 1.8 mg/dL


(0.6-1.0) 





 


Estimated GFR


(Cockcroft-Gault) 


 


 29.9 


 





 


Glucose Level


 


 


 209 mg/dL


(70-99) 





 


Calcium Level


 


 


 8.2 mg/dL


(8.5-10.1) 





 


Test


 8/11/20


12:20 8/11/20


13:42 8/11/20


14:30 8/11/20


18:09


 


Glucose (Fingerstick)


 172 mg/dL


(70-99) 


 


 194 mg/dL


(70-99)


 


O2 Saturation  98 % (92-99)   


 


Arterial Blood pH


 


 7.29


(7.35-7.45) 


 





 


Arterial Blood pCO2 at


Patient Temp 


 35 mmHg


(35-46) 


 





 


Arterial Blood pO2 at Patient


Temp 


 105 mmHg


() 


 





 


Arterial Blood HCO3


 


 17 mmol/L


(21-28) 


 





 


Arterial Blood Base Excess


 


 -9 mmol/L


(-3-3) 


 





 


FiO2  4l n.c.   


 


White Blood Count


 


 


 19.3 x10^3/uL


(4.0-11.0) 





 


Red Blood Count


 


 


 2.64 x10^6/uL


(3.50-5.40) 





 


Hemoglobin


 


 


 7.4 g/dL


(12.0-15.5) 





 


Hematocrit


 


 


 23.4 %


(36.0-47.0) 





 


Mean Corpuscular Volume   89 fL ()  


 


Mean Corpuscular Hemoglobin   28 pg (25-35)  


 


Mean Corpuscular Hemoglobin


Concent 


 


 32 g/dL


(31-37) 





 


Red Cell Distribution Width


 


 


 20.0 %


(11.5-14.5) 





 


Platelet Count


 


 


 439 x10^3/uL


(140-400) 





 


Neutrophils (%) (Auto)   93 % (31-73)  


 


Lymphocytes (%) (Auto)   2 % (24-48)  


 


Monocytes (%) (Auto)   4 % (0-9)  


 


Eosinophils (%) (Auto)   0 % (0-3)  


 


Basophils (%) (Auto)   0 % (0-3)  


 


Neutrophils # (Auto)


 


 


 18.0 x10^3/uL


(1.8-7.7) 





 


Lymphocytes # (Auto)


 


 


 0.4 x10^3/uL


(1.0-4.8) 





 


Monocytes # (Auto)


 


 


 0.8 x10^3/uL


(0.0-1.1) 





 


Eosinophils # (Auto)


 


 


 0.0 x10^3/uL


(0.0-0.7) 





 


Basophils # (Auto)


 


 


 0.0 x10^3/uL


(0.0-0.2) 





 


Sodium Level


 


 


 154 mmol/L


(136-145) 





 


Potassium Level


 


 


 5.2 mmol/L


(3.5-5.1) 





 


Chloride Level


 


 


 117 mmol/L


() 





 


Carbon Dioxide Level


 


 


 29 mmol/L


(21-32) 





 


Anion Gap   8 (6-14)  


 


Blood Urea Nitrogen


 


 


 69 mg/dL


(7-20) 





 


Creatinine


 


 


 2.2 mg/dL


(0.6-1.0) 





 


Estimated GFR


(Cockcroft-Gault) 


 


 23.7 


 





 


BUN/Creatinine Ratio   31 (6-20)  


 


Glucose Level


 


 


 219 mg/dL


(70-99) 





 


Lactic Acid Level


 


 


 3.3 mmol/L


(0.4-2.0) 





 


Calcium Level


 


 


 7.4 mg/dL


(8.5-10.1) 





 


Total Bilirubin


 


 


 0.3 mg/dL


(0.2-1.0) 





 


Aspartate Amino Transf


(AST/SGOT) 


 


 15 U/L (15-37) 


 





 


Alanine Aminotransferase


(ALT/SGPT) 


 


 10 U/L (14-59) 


 





 


Alkaline Phosphatase


 


 


 76 U/L


() 





 


Total Protein


 


 


 4.9 g/dL


(6.4-8.2) 





 


Albumin


 


 


 1.3 g/dL


(3.4-5.0) 





 


Albumin/Globulin Ratio   0.4 (1.0-1.7)  


 


Test


 8/11/20


21:30 8/12/20


00:38 8/12/20


05:15 8/12/20


07:19


 


Lactic Acid Level


 2.8 mmol/L


(0.4-2.0) 


 


 





 


Glucose (Fingerstick)


 


 167 mg/dL


(70-99) 


 166 mg/dL


(70-99)


 


White Blood Count


 


 


 13.8 x10^3/uL


(4.0-11.0) 





 


Red Blood Count


 


 


 2.64 x10^6/uL


(3.50-5.40) 





 


Hemoglobin


 


 


 7.3 g/dL


(12.0-15.5) 





 


Hematocrit


 


 


 23.7 %


(36.0-47.0) 





 


Mean Corpuscular Volume   90 fL ()  


 


Mean Corpuscular Hemoglobin   28 pg (25-35)  


 


Mean Corpuscular Hemoglobin


Concent 


 


 31 g/dL


(31-37) 





 


Red Cell Distribution Width


 


 


 19.6 %


(11.5-14.5) 





 


Platelet Count


 


 


 397 x10^3/uL


(140-400) 





 


Neutrophils (%) (Auto)   87 % (31-73)  


 


Lymphocytes (%) (Auto)   7 % (24-48)  


 


Monocytes (%) (Auto)   5 % (0-9)  


 


Eosinophils (%) (Auto)   1 % (0-3)  


 


Basophils (%) (Auto)   0 % (0-3)  


 


Neutrophils # (Auto)


 


 


 12.0 x10^3/uL


(1.8-7.7) 





 


Lymphocytes # (Auto)


 


 


 0.9 x10^3/uL


(1.0-4.8) 





 


Monocytes # (Auto)


 


 


 0.7 x10^3/uL


(0.0-1.1) 





 


Eosinophils # (Auto)


 


 


 0.2 x10^3/uL


(0.0-0.7) 





 


Basophils # (Auto)


 


 


 0.0 x10^3/uL


(0.0-0.2) 





 


Sodium Level


 


 


 152 mmol/L


(136-145) 





 


Potassium Level


 


 


 4.6 mmol/L


(3.5-5.1) 





 


Chloride Level


 


 


 117 mmol/L


() 





 


Carbon Dioxide Level


 


 


 26 mmol/L


(21-32) 





 


Anion Gap   9 (6-14)  


 


Blood Urea Nitrogen


 


 


 76 mg/dL


(7-20) 





 


Creatinine


 


 


 2.1 mg/dL


(0.6-1.0) 





 


Estimated GFR


(Cockcroft-Gault) 


 


 25.0 


 





 


BUN/Creatinine Ratio   36 (6-20)  


 


Glucose Level


 


 


 171 mg/dL


(70-99) 





 


Calcium Level


 


 


 7.4 mg/dL


(8.5-10.1) 





 


Total Bilirubin


 


 


 0.2 mg/dL


(0.2-1.0) 





 


Aspartate Amino Transf


(AST/SGOT) 


 


 20 U/L (15-37) 


 





 


Alanine Aminotransferase


(ALT/SGPT) 


 


 8 U/L (14-59) 


 





 


Alkaline Phosphatase


 


 


 86 U/L


() 





 


Total Protein


 


 


 5.0 g/dL


(6.4-8.2) 





 


Albumin


 


 


 1.3 g/dL


(3.4-5.0) 





 


Albumin/Globulin Ratio   0.4 (1.0-1.7)  


 


Test


 8/12/20


11:59 


 


 





 


Glucose (Fingerstick)


 126 mg/dL


(70-99) 


 


 











Laboratory Tests








Test


 8/11/20


13:42 8/11/20


14:30 8/11/20


18:09 8/11/20


21:30


 


O2 Saturation 98 % (92-99)    


 


Arterial Blood pH


 7.29


(7.35-7.45) 


 


 





 


Arterial Blood pCO2 at


Patient Temp 35 mmHg


(35-46) 


 


 





 


Arterial Blood pO2 at Patient


Temp 105 mmHg


() 


 


 





 


Arterial Blood HCO3


 17 mmol/L


(21-28) 


 


 





 


Arterial Blood Base Excess


 -9 mmol/L


(-3-3) 


 


 





 


FiO2 4l n.c.    


 


White Blood Count


 


 19.3 x10^3/uL


(4.0-11.0) 


 





 


Red Blood Count


 


 2.64 x10^6/uL


(3.50-5.40) 


 





 


Hemoglobin


 


 7.4 g/dL


(12.0-15.5) 


 





 


Hematocrit


 


 23.4 %


(36.0-47.0) 


 





 


Mean Corpuscular Volume  89 fL ()   


 


Mean Corpuscular Hemoglobin  28 pg (25-35)   


 


Mean Corpuscular Hemoglobin


Concent 


 32 g/dL


(31-37) 


 





 


Red Cell Distribution Width


 


 20.0 %


(11.5-14.5) 


 





 


Platelet Count


 


 439 x10^3/uL


(140-400) 


 





 


Neutrophils (%) (Auto)  93 % (31-73)   


 


Lymphocytes (%) (Auto)  2 % (24-48)   


 


Monocytes (%) (Auto)  4 % (0-9)   


 


Eosinophils (%) (Auto)  0 % (0-3)   


 


Basophils (%) (Auto)  0 % (0-3)   


 


Neutrophils # (Auto)


 


 18.0 x10^3/uL


(1.8-7.7) 


 





 


Lymphocytes # (Auto)


 


 0.4 x10^3/uL


(1.0-4.8) 


 





 


Monocytes # (Auto)


 


 0.8 x10^3/uL


(0.0-1.1) 


 





 


Eosinophils # (Auto)


 


 0.0 x10^3/uL


(0.0-0.7) 


 





 


Basophils # (Auto)


 


 0.0 x10^3/uL


(0.0-0.2) 


 





 


Sodium Level


 


 154 mmol/L


(136-145) 


 





 


Potassium Level


 


 5.2 mmol/L


(3.5-5.1) 


 





 


Chloride Level


 


 117 mmol/L


() 


 





 


Carbon Dioxide Level


 


 29 mmol/L


(21-32) 


 





 


Anion Gap  8 (6-14)   


 


Blood Urea Nitrogen


 


 69 mg/dL


(7-20) 


 





 


Creatinine


 


 2.2 mg/dL


(0.6-1.0) 


 





 


Estimated GFR


(Cockcroft-Gault) 


 23.7 


 


 





 


BUN/Creatinine Ratio  31 (6-20)   


 


Glucose Level


 


 219 mg/dL


(70-99) 


 





 


Lactic Acid Level


 


 3.3 mmol/L


(0.4-2.0) 


 2.8 mmol/L


(0.4-2.0)


 


Calcium Level


 


 7.4 mg/dL


(8.5-10.1) 


 





 


Total Bilirubin


 


 0.3 mg/dL


(0.2-1.0) 


 





 


Aspartate Amino Transf


(AST/SGOT) 


 15 U/L (15-37) 


 


 





 


Alanine Aminotransferase


(ALT/SGPT) 


 10 U/L (14-59) 


 


 





 


Alkaline Phosphatase


 


 76 U/L


() 


 





 


Total Protein


 


 4.9 g/dL


(6.4-8.2) 


 





 


Albumin


 


 1.3 g/dL


(3.4-5.0) 


 





 


Albumin/Globulin Ratio  0.4 (1.0-1.7)   


 


Glucose (Fingerstick)


 


 


 194 mg/dL


(70-99) 





 


Test


 8/12/20


00:38 8/12/20


05:15 8/12/20


07:19 8/12/20


11:59


 


Glucose (Fingerstick)


 167 mg/dL


(70-99) 


 166 mg/dL


(70-99) 126 mg/dL


(70-99)


 


White Blood Count


 


 13.8 x10^3/uL


(4.0-11.0) 


 





 


Red Blood Count


 


 2.64 x10^6/uL


(3.50-5.40) 


 





 


Hemoglobin


 


 7.3 g/dL


(12.0-15.5) 


 





 


Hematocrit


 


 23.7 %


(36.0-47.0) 


 





 


Mean Corpuscular Volume  90 fL ()   


 


Mean Corpuscular Hemoglobin  28 pg (25-35)   


 


Mean Corpuscular Hemoglobin


Concent 


 31 g/dL


(31-37) 


 





 


Red Cell Distribution Width


 


 19.6 %


(11.5-14.5) 


 





 


Platelet Count


 


 397 x10^3/uL


(140-400) 


 





 


Neutrophils (%) (Auto)  87 % (31-73)   


 


Lymphocytes (%) (Auto)  7 % (24-48)   


 


Monocytes (%) (Auto)  5 % (0-9)   


 


Eosinophils (%) (Auto)  1 % (0-3)   


 


Basophils (%) (Auto)  0 % (0-3)   


 


Neutrophils # (Auto)


 


 12.0 x10^3/uL


(1.8-7.7) 


 





 


Lymphocytes # (Auto)


 


 0.9 x10^3/uL


(1.0-4.8) 


 





 


Monocytes # (Auto)


 


 0.7 x10^3/uL


(0.0-1.1) 


 





 


Eosinophils # (Auto)


 


 0.2 x10^3/uL


(0.0-0.7) 


 





 


Basophils # (Auto)


 


 0.0 x10^3/uL


(0.0-0.2) 


 





 


Sodium Level


 


 152 mmol/L


(136-145) 


 





 


Potassium Level


 


 4.6 mmol/L


(3.5-5.1) 


 





 


Chloride Level


 


 117 mmol/L


() 


 





 


Carbon Dioxide Level


 


 26 mmol/L


(21-32) 


 





 


Anion Gap  9 (6-14)   


 


Blood Urea Nitrogen


 


 76 mg/dL


(7-20) 


 





 


Creatinine


 


 2.1 mg/dL


(0.6-1.0) 


 





 


Estimated GFR


(Cockcroft-Gault) 


 25.0 


 


 





 


BUN/Creatinine Ratio  36 (6-20)   


 


Glucose Level


 


 171 mg/dL


(70-99) 


 





 


Calcium Level


 


 7.4 mg/dL


(8.5-10.1) 


 





 


Total Bilirubin


 


 0.2 mg/dL


(0.2-1.0) 


 





 


Aspartate Amino Transf


(AST/SGOT) 


 20 U/L (15-37) 


 


 





 


Alanine Aminotransferase


(ALT/SGPT) 


 8 U/L (14-59) 


 


 





 


Alkaline Phosphatase


 


 86 U/L


() 


 





 


Total Protein


 


 5.0 g/dL


(6.4-8.2) 


 





 


Albumin


 


 1.3 g/dL


(3.4-5.0) 


 





 


Albumin/Globulin Ratio  0.4 (1.0-1.7)   








Problem List


Problems


Medical Problems:


(1) Acute pancreatitis


Status: Acute  





(2) Cholelithiasis


Status: Acute  








Assessment/Plan


Hemodynamically stable no acute changes


Continue current supportive care





Justicifation of Admission Dx:


Justifications for Admission:


Justification of Admission Dx:  Yes











OVIDIO MEDINA MD             Aug 12, 2020 13:17

## 2020-08-12 NOTE — NUR
SW following. Discussed with RN, pt not ready for discharge, family dynamics and medical 
needs are preventing pt from discharging. SW will continue to follow.

## 2020-08-12 NOTE — PDOC
Date of Service:


DATE: 8/12/20 


TIME: 10:26





Objective:


Objective:


D/w nurse - no new GI issues, might be perkier today.


Vital Signs:





                                   Vital Signs








  Date Time  Temp Pulse Resp B/P (MAP) Pulse Ox O2 Delivery O2 Flow Rate FiO2


 


8/12/20 08:59     98 Room Air  


 


8/12/20 08:28       2.0 


 


8/12/20 07:00 97.6 119 28 103/64 (77)    





 97.6       








Labs:





Laboratory Tests








Test


 8/11/20


12:20 8/11/20


13:42 8/11/20


14:30 8/11/20


18:09


 


Glucose (Fingerstick) 172 mg/dL    194 mg/dL 


 


O2 Saturation  98 %   


 


Arterial Blood pH  7.29   


 


Arterial Blood pCO2 at


Patient Temp 


 35 mmHg 


 


 





 


Arterial Blood pO2 at Patient


Temp 


 105 mmHg 


 


 





 


Arterial Blood HCO3  17 mmol/L   


 


Arterial Blood Base Excess  -9 mmol/L   


 


FiO2  4l n.c.   


 


White Blood Count   19.3 x10^3/uL  


 


Red Blood Count   2.64 x10^6/uL  


 


Hemoglobin   7.4 g/dL  


 


Hematocrit   23.4 %  


 


Mean Corpuscular Volume   89 fL  


 


Mean Corpuscular Hemoglobin   28 pg  


 


Mean Corpuscular Hemoglobin


Concent 


 


 32 g/dL 


 





 


Red Cell Distribution Width   20.0 %  


 


Platelet Count   439 x10^3/uL  


 


Neutrophils (%) (Auto)   93 %  


 


Lymphocytes (%) (Auto)   2 %  


 


Monocytes (%) (Auto)   4 %  


 


Eosinophils (%) (Auto)   0 %  


 


Basophils (%) (Auto)   0 %  


 


Neutrophils # (Auto)   18.0 x10^3/uL  


 


Lymphocytes # (Auto)   0.4 x10^3/uL  


 


Monocytes # (Auto)   0.8 x10^3/uL  


 


Eosinophils # (Auto)   0.0 x10^3/uL  


 


Basophils # (Auto)   0.0 x10^3/uL  


 


Sodium Level   154 mmol/L  


 


Potassium Level   5.2 mmol/L  


 


Chloride Level   117 mmol/L  


 


Carbon Dioxide Level   29 mmol/L  


 


Anion Gap   8  


 


Blood Urea Nitrogen   69 mg/dL  


 


Creatinine   2.2 mg/dL  


 


Estimated GFR


(Cockcroft-Gault) 


 


 23.7 


 





 


BUN/Creatinine Ratio   31  


 


Glucose Level   219 mg/dL  


 


Lactic Acid Level   3.3 mmol/L  


 


Calcium Level   7.4 mg/dL  


 


Total Bilirubin   0.3 mg/dL  


 


Aspartate Amino Transf


(AST/SGOT) 


 


 15 U/L 


 





 


Alanine Aminotransferase


(ALT/SGPT) 


 


 10 U/L 


 





 


Alkaline Phosphatase   76 U/L  


 


Total Protein   4.9 g/dL  


 


Albumin   1.3 g/dL  


 


Albumin/Globulin Ratio   0.4  


 


Test


 8/11/20


21:30 8/12/20


00:38 8/12/20


05:15 8/12/20


07:19


 


Lactic Acid Level 2.8 mmol/L    


 


Glucose (Fingerstick)  167 mg/dL   166 mg/dL 


 


White Blood Count   13.8 x10^3/uL  


 


Red Blood Count   2.64 x10^6/uL  


 


Hemoglobin   7.3 g/dL  


 


Hematocrit   23.7 %  


 


Mean Corpuscular Volume   90 fL  


 


Mean Corpuscular Hemoglobin   28 pg  


 


Mean Corpuscular Hemoglobin


Concent 


 


 31 g/dL 


 





 


Red Cell Distribution Width   19.6 %  


 


Platelet Count   397 x10^3/uL  


 


Neutrophils (%) (Auto)   87 %  


 


Lymphocytes (%) (Auto)   7 %  


 


Monocytes (%) (Auto)   5 %  


 


Eosinophils (%) (Auto)   1 %  


 


Basophils (%) (Auto)   0 %  


 


Neutrophils # (Auto)   12.0 x10^3/uL  


 


Lymphocytes # (Auto)   0.9 x10^3/uL  


 


Monocytes # (Auto)   0.7 x10^3/uL  


 


Eosinophils # (Auto)   0.2 x10^3/uL  


 


Basophils # (Auto)   0.0 x10^3/uL  


 


Sodium Level   152 mmol/L  


 


Potassium Level   4.6 mmol/L  


 


Chloride Level   117 mmol/L  


 


Carbon Dioxide Level   26 mmol/L  


 


Anion Gap   9  


 


Blood Urea Nitrogen   76 mg/dL  


 


Creatinine   2.1 mg/dL  


 


Estimated GFR


(Cockcroft-Gault) 


 


 25.0 


 





 


BUN/Creatinine Ratio   36  


 


Glucose Level   171 mg/dL  


 


Calcium Level   7.4 mg/dL  


 


Total Bilirubin   0.2 mg/dL  


 


Aspartate Amino Transf


(AST/SGOT) 


 


 20 U/L 


 





 


Alanine Aminotransferase


(ALT/SGPT) 


 


 8 U/L 


 





 


Alkaline Phosphatase   86 U/L  


 


Total Protein   5.0 g/dL  


 


Albumin   1.3 g/dL  


 


Albumin/Globulin Ratio   0.4  











  BLOOD CULTURE  Preliminary  


        NO GROWTH AFTER 2 DAYS


Imaging:


CXR 8/11


IMPRESSION:


1.  Stable life support devices.


2.  Small bilateral pleural effusions and mild bibasilar airspace disease are 

unchanged.





PE:





GEN: chronically ill


NEURO/PSYCH: resting, not disturbed





A/P:


S/p pancreatic necrosectomy





--


Continue support.





Justicifation of Admission Dx:


Justifications for Admission:


Justification of Admission Dx:  Yes











RAGHAVENDRA MURCIA         Aug 12, 2020 10:28

## 2020-08-12 NOTE — PDOC
PULMONARY PROGRESS NOTES


DATE: 8/12/20 


TIME: 11:03


Subjective


had a rapid response yesterday


had increase R/R , ABG ok. likely met acidosis, better today


Vitals





Vital Signs








  Date Time  Temp Pulse Resp B/P (MAP) Pulse Ox O2 Delivery O2 Flow Rate FiO2


 


8/12/20 08:59     98 Room Air  


 


8/12/20 08:28       2.0 


 


8/12/20 07:00 97.6 119 28 103/64 (77)    





 97.6       








ROS:  No Nausea, No Chest Pain, No Abdominal Pain, No Increase Cough


General:  Alert, No acute distress


Lungs:  Clear


Cardiovascular:  S1, S2


Abdomen:  Soft, Non-tender, Other (multiple ROBERT drains )


Neuro Exam:  Alert


Extremities:  Other (+1 BLE edema)


Skin:  Warm


Labs





Laboratory Tests








Test


 8/10/20


12:27 8/10/20


18:38 8/11/20


00:25 8/11/20


05:30


 


Glucose (Fingerstick)


 171 mg/dL


(70-99) 210 mg/dL


(70-99) 185 mg/dL


(70-99) 





 


Sodium Level


 


 


 


 147 mmol/L


(136-145)


 


Potassium Level


 


 


 


 5.9 mmol/L


(3.5-5.1)


 


Chloride Level


 


 


 


 113 mmol/L


()


 


Carbon Dioxide Level


 


 


 


 25 mmol/L


(21-32)


 


Anion Gap    9 (6-14) 


 


Blood Urea Nitrogen


 


 


 


 61 mg/dL


(7-20)


 


Creatinine


 


 


 


 1.8 mg/dL


(0.6-1.0)


 


Estimated GFR


(Cockcroft-Gault) 


 


 


 29.9 





 


Glucose Level


 


 


 


 209 mg/dL


(70-99)


 


Calcium Level


 


 


 


 8.2 mg/dL


(8.5-10.1)


 


Test


 8/11/20


05:34 8/11/20


12:20 8/11/20


13:42 8/11/20


14:30


 


Glucose (Fingerstick)


 190 mg/dL


(70-99) 172 mg/dL


(70-99) 


 





 


O2 Saturation   98 % (92-99)  


 


Arterial Blood pH


 


 


 7.29


(7.35-7.45) 





 


Arterial Blood pCO2 at


Patient Temp 


 


 35 mmHg


(35-46) 





 


Arterial Blood pO2 at Patient


Temp 


 


 105 mmHg


() 





 


Arterial Blood HCO3


 


 


 17 mmol/L


(21-28) 





 


Arterial Blood Base Excess


 


 


 -9 mmol/L


(-3-3) 





 


FiO2   4l n.c.  


 


White Blood Count


 


 


 


 19.3 x10^3/uL


(4.0-11.0)


 


Red Blood Count


 


 


 


 2.64 x10^6/uL


(3.50-5.40)


 


Hemoglobin


 


 


 


 7.4 g/dL


(12.0-15.5)


 


Hematocrit


 


 


 


 23.4 %


(36.0-47.0)


 


Mean Corpuscular Volume    89 fL () 


 


Mean Corpuscular Hemoglobin    28 pg (25-35) 


 


Mean Corpuscular Hemoglobin


Concent 


 


 


 32 g/dL


(31-37)


 


Red Cell Distribution Width


 


 


 


 20.0 %


(11.5-14.5)


 


Platelet Count


 


 


 


 439 x10^3/uL


(140-400)


 


Neutrophils (%) (Auto)    93 % (31-73) 


 


Lymphocytes (%) (Auto)    2 % (24-48) 


 


Monocytes (%) (Auto)    4 % (0-9) 


 


Eosinophils (%) (Auto)    0 % (0-3) 


 


Basophils (%) (Auto)    0 % (0-3) 


 


Neutrophils # (Auto)


 


 


 


 18.0 x10^3/uL


(1.8-7.7)


 


Lymphocytes # (Auto)


 


 


 


 0.4 x10^3/uL


(1.0-4.8)


 


Monocytes # (Auto)


 


 


 


 0.8 x10^3/uL


(0.0-1.1)


 


Eosinophils # (Auto)


 


 


 


 0.0 x10^3/uL


(0.0-0.7)


 


Basophils # (Auto)


 


 


 


 0.0 x10^3/uL


(0.0-0.2)


 


Sodium Level


 


 


 


 154 mmol/L


(136-145)


 


Potassium Level


 


 


 


 5.2 mmol/L


(3.5-5.1)


 


Chloride Level


 


 


 


 117 mmol/L


()


 


Carbon Dioxide Level


 


 


 


 29 mmol/L


(21-32)


 


Anion Gap    8 (6-14) 


 


Blood Urea Nitrogen


 


 


 


 69 mg/dL


(7-20)


 


Creatinine


 


 


 


 2.2 mg/dL


(0.6-1.0)


 


Estimated GFR


(Cockcroft-Gault) 


 


 


 23.7 





 


BUN/Creatinine Ratio    31 (6-20) 


 


Glucose Level


 


 


 


 219 mg/dL


(70-99)


 


Lactic Acid Level


 


 


 


 3.3 mmol/L


(0.4-2.0)


 


Calcium Level


 


 


 


 7.4 mg/dL


(8.5-10.1)


 


Total Bilirubin


 


 


 


 0.3 mg/dL


(0.2-1.0)


 


Aspartate Amino Transf


(AST/SGOT) 


 


 


 15 U/L (15-37) 





 


Alanine Aminotransferase


(ALT/SGPT) 


 


 


 10 U/L (14-59) 





 


Alkaline Phosphatase


 


 


 


 76 U/L


()


 


Total Protein


 


 


 


 4.9 g/dL


(6.4-8.2)


 


Albumin


 


 


 


 1.3 g/dL


(3.4-5.0)


 


Albumin/Globulin Ratio    0.4 (1.0-1.7) 


 


Test


 8/11/20


18:09 8/11/20


21:30 8/12/20


00:38 8/12/20


05:15


 


Glucose (Fingerstick)


 194 mg/dL


(70-99) 


 167 mg/dL


(70-99) 





 


Lactic Acid Level


 


 2.8 mmol/L


(0.4-2.0) 


 





 


White Blood Count


 


 


 


 13.8 x10^3/uL


(4.0-11.0)


 


Red Blood Count


 


 


 


 2.64 x10^6/uL


(3.50-5.40)


 


Hemoglobin


 


 


 


 7.3 g/dL


(12.0-15.5)


 


Hematocrit


 


 


 


 23.7 %


(36.0-47.0)


 


Mean Corpuscular Volume    90 fL () 


 


Mean Corpuscular Hemoglobin    28 pg (25-35) 


 


Mean Corpuscular Hemoglobin


Concent 


 


 


 31 g/dL


(31-37)


 


Red Cell Distribution Width


 


 


 


 19.6 %


(11.5-14.5)


 


Platelet Count


 


 


 


 397 x10^3/uL


(140-400)


 


Neutrophils (%) (Auto)    87 % (31-73) 


 


Lymphocytes (%) (Auto)    7 % (24-48) 


 


Monocytes (%) (Auto)    5 % (0-9) 


 


Eosinophils (%) (Auto)    1 % (0-3) 


 


Basophils (%) (Auto)    0 % (0-3) 


 


Neutrophils # (Auto)


 


 


 


 12.0 x10^3/uL


(1.8-7.7)


 


Lymphocytes # (Auto)


 


 


 


 0.9 x10^3/uL


(1.0-4.8)


 


Monocytes # (Auto)


 


 


 


 0.7 x10^3/uL


(0.0-1.1)


 


Eosinophils # (Auto)


 


 


 


 0.2 x10^3/uL


(0.0-0.7)


 


Basophils # (Auto)


 


 


 


 0.0 x10^3/uL


(0.0-0.2)


 


Sodium Level


 


 


 


 152 mmol/L


(136-145)


 


Potassium Level


 


 


 


 4.6 mmol/L


(3.5-5.1)


 


Chloride Level


 


 


 


 117 mmol/L


()


 


Carbon Dioxide Level


 


 


 


 26 mmol/L


(21-32)


 


Anion Gap    9 (6-14) 


 


Blood Urea Nitrogen


 


 


 


 76 mg/dL


(7-20)


 


Creatinine


 


 


 


 2.1 mg/dL


(0.6-1.0)


 


Estimated GFR


(Cockcroft-Gault) 


 


 


 25.0 





 


BUN/Creatinine Ratio    36 (6-20) 


 


Glucose Level


 


 


 


 171 mg/dL


(70-99)


 


Calcium Level


 


 


 


 7.4 mg/dL


(8.5-10.1)


 


Total Bilirubin


 


 


 


 0.2 mg/dL


(0.2-1.0)


 


Aspartate Amino Transf


(AST/SGOT) 


 


 


 20 U/L (15-37) 





 


Alanine Aminotransferase


(ALT/SGPT) 


 


 


 8 U/L (14-59) 





 


Alkaline Phosphatase


 


 


 


 86 U/L


()


 


Total Protein


 


 


 


 5.0 g/dL


(6.4-8.2)


 


Albumin


 


 


 


 1.3 g/dL


(3.4-5.0)


 


Albumin/Globulin Ratio    0.4 (1.0-1.7) 


 


Test


 8/12/20


07:19 


 


 





 


Glucose (Fingerstick)


 166 mg/dL


(70-99) 


 


 











Laboratory Tests








Test


 8/11/20


12:20 8/11/20


13:42 8/11/20


14:30 8/11/20


18:09


 


Glucose (Fingerstick)


 172 mg/dL


(70-99) 


 


 194 mg/dL


(70-99)


 


O2 Saturation  98 % (92-99)   


 


Arterial Blood pH


 


 7.29


(7.35-7.45) 


 





 


Arterial Blood pCO2 at


Patient Temp 


 35 mmHg


(35-46) 


 





 


Arterial Blood pO2 at Patient


Temp 


 105 mmHg


() 


 





 


Arterial Blood HCO3


 


 17 mmol/L


(21-28) 


 





 


Arterial Blood Base Excess


 


 -9 mmol/L


(-3-3) 


 





 


FiO2  4l n.c.   


 


White Blood Count


 


 


 19.3 x10^3/uL


(4.0-11.0) 





 


Red Blood Count


 


 


 2.64 x10^6/uL


(3.50-5.40) 





 


Hemoglobin


 


 


 7.4 g/dL


(12.0-15.5) 





 


Hematocrit


 


 


 23.4 %


(36.0-47.0) 





 


Mean Corpuscular Volume   89 fL ()  


 


Mean Corpuscular Hemoglobin   28 pg (25-35)  


 


Mean Corpuscular Hemoglobin


Concent 


 


 32 g/dL


(31-37) 





 


Red Cell Distribution Width


 


 


 20.0 %


(11.5-14.5) 





 


Platelet Count


 


 


 439 x10^3/uL


(140-400) 





 


Neutrophils (%) (Auto)   93 % (31-73)  


 


Lymphocytes (%) (Auto)   2 % (24-48)  


 


Monocytes (%) (Auto)   4 % (0-9)  


 


Eosinophils (%) (Auto)   0 % (0-3)  


 


Basophils (%) (Auto)   0 % (0-3)  


 


Neutrophils # (Auto)


 


 


 18.0 x10^3/uL


(1.8-7.7) 





 


Lymphocytes # (Auto)


 


 


 0.4 x10^3/uL


(1.0-4.8) 





 


Monocytes # (Auto)


 


 


 0.8 x10^3/uL


(0.0-1.1) 





 


Eosinophils # (Auto)


 


 


 0.0 x10^3/uL


(0.0-0.7) 





 


Basophils # (Auto)


 


 


 0.0 x10^3/uL


(0.0-0.2) 





 


Sodium Level


 


 


 154 mmol/L


(136-145) 





 


Potassium Level


 


 


 5.2 mmol/L


(3.5-5.1) 





 


Chloride Level


 


 


 117 mmol/L


() 





 


Carbon Dioxide Level


 


 


 29 mmol/L


(21-32) 





 


Anion Gap   8 (6-14)  


 


Blood Urea Nitrogen


 


 


 69 mg/dL


(7-20) 





 


Creatinine


 


 


 2.2 mg/dL


(0.6-1.0) 





 


Estimated GFR


(Cockcroft-Gault) 


 


 23.7 


 





 


BUN/Creatinine Ratio   31 (6-20)  


 


Glucose Level


 


 


 219 mg/dL


(70-99) 





 


Lactic Acid Level


 


 


 3.3 mmol/L


(0.4-2.0) 





 


Calcium Level


 


 


 7.4 mg/dL


(8.5-10.1) 





 


Total Bilirubin


 


 


 0.3 mg/dL


(0.2-1.0) 





 


Aspartate Amino Transf


(AST/SGOT) 


 


 15 U/L (15-37) 


 





 


Alanine Aminotransferase


(ALT/SGPT) 


 


 10 U/L (14-59) 


 





 


Alkaline Phosphatase


 


 


 76 U/L


() 





 


Total Protein


 


 


 4.9 g/dL


(6.4-8.2) 





 


Albumin


 


 


 1.3 g/dL


(3.4-5.0) 





 


Albumin/Globulin Ratio   0.4 (1.0-1.7)  


 


Test


 8/11/20


21:30 8/12/20


00:38 8/12/20


05:15 8/12/20


07:19


 


Lactic Acid Level


 2.8 mmol/L


(0.4-2.0) 


 


 





 


Glucose (Fingerstick)


 


 167 mg/dL


(70-99) 


 166 mg/dL


(70-99)


 


White Blood Count


 


 


 13.8 x10^3/uL


(4.0-11.0) 





 


Red Blood Count


 


 


 2.64 x10^6/uL


(3.50-5.40) 





 


Hemoglobin


 


 


 7.3 g/dL


(12.0-15.5) 





 


Hematocrit


 


 


 23.7 %


(36.0-47.0) 





 


Mean Corpuscular Volume   90 fL ()  


 


Mean Corpuscular Hemoglobin   28 pg (25-35)  


 


Mean Corpuscular Hemoglobin


Concent 


 


 31 g/dL


(31-37) 





 


Red Cell Distribution Width


 


 


 19.6 %


(11.5-14.5) 





 


Platelet Count


 


 


 397 x10^3/uL


(140-400) 





 


Neutrophils (%) (Auto)   87 % (31-73)  


 


Lymphocytes (%) (Auto)   7 % (24-48)  


 


Monocytes (%) (Auto)   5 % (0-9)  


 


Eosinophils (%) (Auto)   1 % (0-3)  


 


Basophils (%) (Auto)   0 % (0-3)  


 


Neutrophils # (Auto)


 


 


 12.0 x10^3/uL


(1.8-7.7) 





 


Lymphocytes # (Auto)


 


 


 0.9 x10^3/uL


(1.0-4.8) 





 


Monocytes # (Auto)


 


 


 0.7 x10^3/uL


(0.0-1.1) 





 


Eosinophils # (Auto)


 


 


 0.2 x10^3/uL


(0.0-0.7) 





 


Basophils # (Auto)


 


 


 0.0 x10^3/uL


(0.0-0.2) 





 


Sodium Level


 


 


 152 mmol/L


(136-145) 





 


Potassium Level


 


 


 4.6 mmol/L


(3.5-5.1) 





 


Chloride Level


 


 


 117 mmol/L


() 





 


Carbon Dioxide Level


 


 


 26 mmol/L


(21-32) 





 


Anion Gap   9 (6-14)  


 


Blood Urea Nitrogen


 


 


 76 mg/dL


(7-20) 





 


Creatinine


 


 


 2.1 mg/dL


(0.6-1.0) 





 


Estimated GFR


(Cockcroft-Gault) 


 


 25.0 


 





 


BUN/Creatinine Ratio   36 (6-20)  


 


Glucose Level


 


 


 171 mg/dL


(70-99) 





 


Calcium Level


 


 


 7.4 mg/dL


(8.5-10.1) 





 


Total Bilirubin


 


 


 0.2 mg/dL


(0.2-1.0) 





 


Aspartate Amino Transf


(AST/SGOT) 


 


 20 U/L (15-37) 


 





 


Alanine Aminotransferase


(ALT/SGPT) 


 


 8 U/L (14-59) 


 





 


Alkaline Phosphatase


 


 


 86 U/L


() 





 


Total Protein


 


 


 5.0 g/dL


(6.4-8.2) 





 


Albumin


 


 


 1.3 g/dL


(3.4-5.0) 





 


Albumin/Globulin Ratio   0.4 (1.0-1.7)  








Medications





Active Scripts








 Medications  Dose


 Route/Sig


 Max Daily Dose Days Date Category


 


 Bisoprolol


 Fumarate 5 Mg


 Tablet  10 Mg


 PO DAILY


   3/16/20 Reported








Comments








ct reviewed 7/12/20, Decreased left-sided effusion after catheter placement. The




right-sided effusion has increased as has atelectasis.


 





 


There has been exchange or placement of multiple drainage 


tubes and a gastrojejunostomy tube. Both collections are smaller. No 


significant new abdominal fluid collection is seen. The jejunal component 


of the gastrojejunostomy tube appears to be looped in the proximal small 


bowel. 











ct abdomen /pelvis 6/6


1. Removal of the percutaneous pigtail drainage catheters since the prior 


exam. Sequela of pancreatitis with extensive pseudocysts again 


demonstrated, the right-sided collections are slightly larger since the 


prior exam, the left-sided collections are stable. See above.


2. Moderate to large left pleural effusion with atelectasis and collapse 


of most of the left lower lobe, stable. Small right pleural effusion is 


stable.


3. Gallstone.





ct chest 6/15 reviewed








 GRAM NEG COCCOBACILLI:MANY


        SQUAMOUS EPI CELL:RARE


        PMN (WBCs):[id number]


        Unless otherwise specified, Testing Performed by:


        [address] [address]


        For Inquires, the Physician may contact the Microbiology


        department at [phone]





  RESPIRATORY CULTURE  Final  


        Final





        MANY GRAM NEGATIVE RODS on 06/15/20 at 1109


        FINAL ID= [PSEUDOMONAS AERUGINOSA]


        MICRO CHARGES


        PSEUDOMONAS AERUGINOSA





  ANTIMICROBIAL SUSCEPTIBILITY  Final  


        Comment





        NEG ANSON 56


        PSEUDOMONAS AERUGINOSA


        ANTIBIOTIC                        RESULT          INTERPRETATION


        AMIKACIN                          <=16                  S


        AZTREONAM                         <=4                   S


        CEFTAZIDIME                       <=1                   S


        CIPROFLOXACIN                     <=0.25                S


        CEFEPIME                          <=2                   S


        CEFTAZIDIME/AVIBACTAM             <=4                   S


        GENTAMICIN                        <=2                   S


        LEVOFLOXACIN                      <=0.5                 S





Impression


.


IMPRESSION:


1.  Acute hypoxemic respiratory failure secondary to ARDS status post trach, 

developed anemia 6/7, blood drainage from RLQ abdomen drain site, and 

surrounding firmness  / developed septic shock 6/7 from abdomen source, required

levo 6/7-- now on room air with trach 


s/p 3 new drains 6/7 with brown color drainage, --- off pressors


S/P Exp. 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 

parapsilosis & PSAE (I-merrem/ceftazidime/AZT/cefepime))


Increase R/R, hr 8/11. RESPONDED TO BICARB/ PRN SUCTION


Acute gallstone pancreatitis with persistent necrosis


  - 4/9.  CT A/P Increased ascites. Persistent evidence of necrotizing 

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


  - 4/27. status post ROBERT drain placement; C. parapsilosis. s/p drain 5/6 + yeast

& high amylase; s/p additional drain on 5/8. Drains removed. 


  -5/6. fluid  candida parapsilosis fluid, amylase high


  - 6/6 showed multiple pseudocysts, slight larger on the right. s/p drains x 3,

6/7.  + PSAE (MDRO-R Cefepime, Zosyn ANSON < 64) and yeast, 


  -6/7 s/p drain replacement x 3; fluid cult PSAE (MDRO), yeast; treated


  -7/12 CT A/P shows smaller fluid collections.  


  -722 CT abdomen and pelvis drains in place       


Ascites s/p paracentesis 4/15 & 5/6. C. parapsilosis 


Cholelithiasis with thickening of the gallbladder wall.


JED, Hyperkalemia, Metabolic acidosis off dialysis


Acute hypoxic resp failure. trach/vent. sputum 6/13  + PSAE (I merrem) ; sputum 

culture July 19+ for PSAE R Merrem, sensitive to cefepime


Pleural effusion status post CTS left side


Abdominal fluid culture 6 /7 MDRO Pseudomonas, yeast


Sputum culture positive 7/19 for MDRO Pseudomonas


Chest tube fluid positive for 7/21 Candida Parapsilosis


S/P Exploratory laparotomy, lysis of adhesions, subtotal cholecystectomy with 

cholangiogram, gastrojejunostomy tube placement, pancreatic necrosectomy














Plan


.


Improved today from pulmonary stand point 


prn suction


no trach decanulation for now.


chest tube REMOVED 7/24


Transfuse as needed, monitor HGB


Antibiotics per ID,


Up to chair, PT/OT


Follow surgery input-- 


DVT GI prophylaxis








DVT/GI PPX








Will see PRN call with any concerns 














SHARYN SOLORZANO MD                 Aug 12, 2020 11:08

## 2020-08-12 NOTE — PDOC
Infectious Disease Note


Subjective:


Subjective


Pt little more alert


afebrile last 24 hrs


c/o some abdo discomfort


has liquid stool





Vital Signs:


Vital Signs





Vital Signs








  Date Time  Temp Pulse Resp B/P (MAP) Pulse Ox O2 Delivery O2 Flow Rate FiO2


 


8/12/20 03:45 97.8 122 28 103/56 (72) 100 Nasal Cannula 2.0 





 97.8       











Physical Exam:


PHYSICAL EXAM


GENERAL: Resting in bed, NAD


HEENT: Pupils equal, oral cavity dry. OC/Op - Dry


NECK:  Tracheostomy capped


LUNGS: Diminished aeration bases,


HEART:  S1, S2, 


ABDOMEN: Less distended, mild- bowel sounds hypoactive, soft, GJ drain present, 

drainage bag in place with depedent drainage


: [place] in place 


EXTREMITIES: Trace generalized edema, no cyanosis. Yeast in groin area


SKIN: warm touch. No signs of rash.  


NEURO: - Alert and responsive


RUE  PICC site without signs of complications. PIV s clean


EC fistula





Medications:


Inpatient Meds:





Current Medications








 Medications


  (Trade)  Dose


 Ordered  Sig/Yee  Start Time


 Stop Time Status Last Admin


Dose Admin


 


 Acetaminophen


  (Tylenol Supp)  650 mg  PRN Q6HRS  PRN  3/24/20 10:30


    8/10/20 15:43


650 MG


 


 Acetaminophen


  (Tylenol)  650 mg  PRN Q6HRS  PRN  3/21/20 03:36


 5/13/20 10:25 DC 4/16/20 19:56


650 MG


 


 Acetaminophen/


 Hydrocodone Bitart


  (Lortab 5/325)  1 tab  PRN Q4HRS  PRN  7/23/20 16:00


    8/12/20 01:40


1 TAB


 


 Acetylcysteine


  (Mucomyst 20%


 Resp Treatment)  600 mg  RTBID  6/27/20 12:00


    8/11/20 19:59


600 MG


 


 Albumin Human  500 ml @ 


 125 mls/hr  PRN Q1HR  PRN  6/30/20 15:45


 8/3/20 09:52 DC  





 


 Albuterol Sulfate


  (Ventolin Neb


 Soln)  2.5 mg  1X  ONCE  3/17/20 22:30


 3/17/20 22:31 DC 3/18/20 00:56


2.5 MG


 


 Albuterol/


 Ipratropium


  (Duoneb)  3 ml  Q4HRS  6/13/20 08:00


    8/12/20 03:39


3 ML


 


 Alprazolam


  (Xanax)  0.5 mg  PRN QID  PRN  7/23/20 16:00


    8/12/20 01:55


0.5 MG


 


 Alteplase,


 Recombinant


  (Cathflo For


 Central Catheter


 Clearance)  1 mg  1X  ONCE  8/5/20 07:00


 8/5/20 07:01 DC 8/5/20 09:30


1 MG


 


 Alteplase,


 Recombinant 4 mg/


 Sodium Chloride  20 ml @ 20


 mls/hr  1X  ONCE  6/17/20 10:00


 6/17/20 10:59 DC 6/17/20 10:09


20 MLS/HR


 


 Alteplase,


 Recombinant 5 mg/


 Sodium Chloride  30 ml @ 30


 mls/hr  1X  PRN  8/5/20 06:45


   UNV  





 


 Amino Acids/


 Glycerin/


 Electrolytes  1,000 ml @ 


 80 mls/hr  [id number]  7/26/20 10:15


 8/2/20 07:07 DC 8/1/20 18:10


80 MLS/HR


 


 Artificial Tears


  (Artificial


 Tears)  1 drop  PRN Q15MIN  PRN  4/29/20 05:30


    6/23/20 21:17


1 DROP


 


 Atenolol


  (Tenormin)  100 mg  DAILY  3/17/20 09:00


 3/16/20 20:08 DC  





 


 Atropine Sulfate


  (ATROPINE 0.5mg


 SYRINGE)  0.5 mg  PRN Q5MIN  PRN  4/2/20 08:15


 8/3/20 09:45 DC  





 


 Barium Sulfate


  (Varibar Thin


 Liquid Apple)  148 gm  1X  ONCE  5/26/20 11:45


 5/26/20 11:49 DC  





 


 Benzocaine


  (Hurricaine One)  1 spray  1X  ONCE  3/20/20 14:30


 3/20/20 14:31 DC 3/20/20 16:38


1 SPRAY


 


 Bisacodyl


  (Dulcolax Supp)  10 mg  STK-MED ONCE  4/27/20 10:59


 4/27/20 10:59 DC  





 


 Bumetanide


  (Bumex)  2 mg  DAILY  5/8/20 10:00


 5/18/20 17:15 DC 5/18/20 08:07


2 MG


 


 Bupivacaine HCl/


 Epinephrine Bitart


  (Sensorcain-Epi


 0.5%-1:651941 Mpf)  30 ml  STK-MED ONCE  6/30/20 08:34


 6/30/20 08:35 DC  





 


 Calcitonin [place]oree


  (Miacalcin)  400 unit  BID  8/6/20 18:00


 8/7/20 15:56 DC 8/6/20 18:18


400 UNIT


 


 Calcium Carbonate/


 Glycine


  (Tums)  500 mg  PRN [id number]  PRN  3/18/20 17:45


 5/13/20 10:25 DC  





 


 Calcium Chloride


 1000 mg/Sodium


 Chloride  110 ml @ 


 220 mls/hr  1X  ONCE  3/17/20 22:30


 3/17/20 22:59 DC 3/17/20 22:11


220 MLS/HR


 


 Calcium Chloride


 3000 mg/Sodium


 Chloride  1,030 ml @ 


 50 mls/hr  [id number]  3/19/20 08:00


 3/21/20 15:23 DC 3/21/20 02:17


50 MLS/HR


 


 Calcium Gluconate


  (Calcium


 Gluconate)  2,000 mg  1X  ONCE  3/19/20 02:15


 3/19/20 02:16 DC 3/19/20 02:19


2,000 MG


 


 Calcium Gluconate


 1000 mg/Sodium


 Chloride  110 ml @ 


 220 mls/hr  1X  ONCE  3/18/20 03:30


 3/18/20 03:59 DC 3/18/20 03:21


220 MLS/HR


 


 Calcium Gluconate


 2000 mg/Sodium


 Chloride  120 ml @ 


 220 mls/hr  1X  ONCE  3/18/20 07:30


 3/18/20 08:02 DC 3/18/20 09:05


220 MLS/HR


 


 Cefepime HCl


  (Maxipime)  2 gm  Q12HR  8/10/20 10:00


    8/11/20 20:31


2 GM


 


 Ceftazidime/


 Avibactam 2.5 gm/


 Sodium Chloride  250 ml @ 


 125 mls/hr  Q8HRS  7/23/20 08:00


 8/5/20 08:20 DC 8/5/20 05:38


125 MLS/HR


 


 Cellulose


  (Surgicel


 Fibrillar 1x2)  1 each  STK-MED ONCE  4/6/20 11:00


 4/6/20 11:01 DC  





 


 Cellulose


  (Surgicel


 Hemostat 2x14)  1 each  STK-MED ONCE  4/27/20 10:58


 4/27/20 10:59 DC  





 


 Cellulose


  (Surgicel


 Hemostat 4x8)  1 each  STK-MED ONCE  4/27/20 10:58


 4/27/20 10:59 DC  





 


 Chlorhexidine


 Gluconate


  (Peridex)  15 ml  BID  6/13/20 09:00


 6/13/20 07:58 DC  





 


 Ciprofloxacin/


 Dextrose  200 ml @ 


 200 mls/hr  Q12HR  7/12/20 10:00


 7/21/20 08:20 DC 7/20/20 21:02


200 MLS/HR


 


 Cyclobenzaprine


 HCl


  (Flexeril)  10 mg  PRN Q6HRS  PRN  4/30/20 10:45


    8/8/20 20:50


10 MG


 


 Daptomycin 410 mg/


 Sodium Chloride  50 ml @ 


 100 mls/hr  Q24H  6/7/20 14:00


 6/10/20 08:30 DC 6/9/20 13:33


100 MLS/HR


 


 Daptomycin 430 mg/


 Sodium Chloride  50 ml @ 


 100 mls/hr  Q24H  4/25/20 13:00


 4/30/20 20:58 DC 4/30/20 13:00


100 MLS/HR


 


 Daptomycin 450 mg/


 Sodium Chloride  50 ml @ 


 100 mls/hr  Q24H  5/17/20 09:00


 5/21/20 08:30 DC 5/20/20 09:25


100 MLS/HR


 


 Daptomycin 485 mg/


 Sodium Chloride  50 ml @ 


 100 mls/hr  Q24H  5/4/20 11:00


 5/12/20 07:44 DC 5/11/20 13:10


100 MLS/HR


 


 Daptomycin 500 mg/


 Sodium Chloride  50 ml @ 


 100 mls/hr  Q24H  7/26/20 09:00


 8/5/20 08:20 DC 8/4/20 09:20


100 MLS/HR


 


 Desflurane


  (Suprane)  90 ml  STK-MED ONCE  6/30/20 10:18


 6/30/20 10:19 DC  





 


 Dexamethasone


 Sodium Phosphate


  (Decadron)  4 mg  STK-MED ONCE  4/27/20 10:56


 4/27/20 10:57 DC  





 


 Dexmedetomidine


 HCl 400 mcg/


 Sodium Chloride  100 ml @ 0


 mls/hr  CONT  PRN  4/2/20 08:15


 5/30/20 18:31 DC 5/30/20 12:57


8 MLS/HR


 


 Dextrose


  (Dextrose


 50%-Water Syringe)  12.5 gm  PRN Q15MIN  PRN  3/16/20 09:30


     





 


 Digoxin


  (Lanoxin)  125 mcg  1X  ONCE  3/19/20 18:00


 3/19/20 18:01 DC 3/19/20 17:10


125 MCG


 


 Diphenhydramine


 HCl


  (Benadryl)  25 mg  1X  ONCE  7/16/20 19:00


 7/16/20 19:01 DC 7/16/20 18:56


25 MG


 


 Duloxetine HCl


  (Cymbalta)  30 mg  DAILY  5/10/20 14:00


 5/13/20 10:25 DC 5/11/20 09:48


30 MG


 


 Enoxaparin Sodium


  (Lovenox 100mg


 Syringe)  100 mg  Q12HR  4/21/20 21:00


   UNV  





 


 Enoxaparin Sodium


  (Lovenox 40mg


 Syringe)  40 mg  Q24H  7/1/20 08:00


    8/11/20 10:09


40 MG


 


 Ephedrine Sulfate


  (ePHEDrine PF IN


 SALINE SYRINGE)  50 mg  STK-MED ONCE  6/30/20 14:45


 6/30/20 14:45 DC  





 


 Etomidate


  (Amidate)  8 mg  1X  ONCE  3/23/20 08:30


 3/23/20 08:31 DC 3/23/20 08:33


8 MG


 


 Fentanyl


  (Duragesic 12mcg/


 Hr Patch)  1 patch  Q3DAYS  7/10/20 09:00


    8/9/20 09:09


1 PATCH


 


 Fentanyl


  (Duragesic 50mcg/


 Hr Patch)  1 patch  Q72H  6/4/20 21:00


 6/13/20 12:00 DC 6/4/20 21:22


1 PATCH


 


 Fentanyl Citrate


  (Fentanyl 2ml


 Vial)  100 mcg  STK-MED ONCE  8/4/20 15:03


 8/4/20 15:03 DC  





 


 Fentanyl Citrate


  (Fentanyl 5ml


 Vial)  250 mcg  1X  ONCE  5/8/20 09:15


 5/8/20 09:16 DC 5/8/20 09:30


50 MCG


 


 Flumazenil


  (Romazicon)  0.5 mg  STK-MED ONCE  6/7/20 14:48


 6/7/20 14:48 DC  





 


 Fluoxetine HCl


  (PROzac)  20 mg  QHS  6/4/20 21:00


    8/11/20 20:47


20 MG


 


 Furosemide


  (Lasix)  40 mg  BID92  8/6/20 09:00


 8/6/20 14:01 DC 8/6/20 13:51


40 MG


 


 Haloperidol


 Lactate


  (Haldol Inj)  3 mg  1X  ONCE  5/4/20 14:30


 5/4/20 14:31 DC 5/4/20 14:37


3 MG


 


 Heparin Sodium


  (Porcine)


  (Hep Lock Adult)  500 unit  STK-MED ONCE  4/7/20 09:29


 4/7/20 09:30 DC  





 


 Heparin Sodium


  (Porcine)


  (Heparin Sodium)  5,000 unit  Q12HR  4/27/20 21:00


 5/7/20 09:59 DC 5/6/20 20:57


5,000 UNIT


 


 Heparin Sodium


  (Porcine) 1000


 unit/Sodium


 Chloride  1,001 ml @ 


 1,001 mls/hr  1X  ONCE  6/30/20 06:00


 6/30/20 06:59 DC  





 


 Hydromorphone HCl


  (Dilaudid


 Standard PCA)  12 mg  STK-MED ONCE  5/1/20 15:50


 5/12/20 11:24 DC  





 


 Hydromorphone HCl


  (Dilaudid)  1 mg  PRN Q4HRS  PRN  5/4/20 19:00


 5/18/20 17:10 DC 5/18/20 06:25


1 MG


 


 Info


  (CONTRAST GIVEN


 -- Rx MONITORING)  1 each  PRN DAILY  PRN  7/21/20 11:45


 7/23/20 11:44 DC  





 


 Info


  (Icu Electrolyte


 Protocol)  1 ea  CONT PRN  PRN  3/29/20 13:15


     





 


 Info


  (PHARMACY


 MONITORING -- do


 not chart)  1 each  PRN DAILY  PRN  4/24/20 15:45


 5/26/20 14:14 DC  





 


 Info


  (Tpn Per


 Pharmacy)  1 each  PRN DAILY  PRN  3/18/20 12:30


   UNV  





 


 Insulin Human


 Lispro


  (HumaLOG)  0-9 UNITS  Q6HRS  3/16/20 09:30


    8/10/20 19:01


3 UNITS


 


 Insulin Human


 Regular


  (HumuLIN R VIAL)  5 unit  1X  ONCE  3/17/20 22:30


 3/17/20 22:31 DC 3/17/20 22:14


5 UNIT


 


 Iohexol


  (Omnipaque 240


 Mg/ml)  10 ml  1X  ONCE  8/4/20 15:45


 8/4/20 15:46 DC 8/4/20 15:00


10 ML


 


 Iohexol


  (Omnipaque 300


 Mg/ml)  50 ml  1X  ONCE  7/28/20 11:00


 7/28/20 11:01 DC  





 


 Iohexol


  (Omnipaque 350


 Mg/ml)  90 ml  1X  ONCE  3/16/20 03:30


 3/16/20 03:31 DC 3/16/20 03:25


90 ML


 


 Ketorolac


 Tromethamine


  (Toradol 30mg


 Vial)  30 mg  1X  ONCE  3/16/20 03:00


 3/16/20 03:01 DC 3/16/20 02:54


30 MG


 


 Lidocaine HCl


  (Buffered


 Lidocaine 1%)  5 ml  1X  ONCE  8/4/20 15:45


 8/4/20 15:46 DC 8/4/20 15:00


5 ML


 


 Lidocaine HCl


  (Glydo


  (Lidocaine) Jelly)  1 thomas  1X  ONCE  3/20/20 14:30


 3/20/20 14:31 DC 3/20/20 16:38


1 THOMAS


 


 Lidocaine HCl


  (Lidocaine 1%


 20ml Vial)  20 ml  1X  ONCE  6/7/20 15:00


 6/7/20 15:01 DC 6/7/20 15:30


20 ML


 


 Lidocaine HCl


  (Lidocaine Pf 2%


 Vial)  5 ml  STK-MED ONCE  6/30/20 07:44


 6/30/20 07:44 DC  





 


 Lidocaine HCl


  (Xylocaine-Mpf


 1% 2ml Vial)  2 ml  PRN 1X  PRN  4/27/20 07:00


 4/28/20 06:59 DC  





 


 Linezolid/Dextrose  300 ml @ 


 300 mls/hr  Q12HR  5/17/20 09:00


 5/20/20 08:11 DC 5/19/20 21:08


300 MLS/HR


 


 Lorazepam


  (Ativan Inj)  0.25 mg  PRN Q4HRS  PRN  6/3/20 07:30


    8/11/20 09:54


0.25 MG


 


 Magnesium Sulfate  50 ml @ 25


 mls/hr  1X  ONCE  6/30/20 16:30


 6/30/20 18:29 DC 6/30/20 17:02


25 MLS/HR


 


 Meropenem 1 gm/


 Sodium Chloride  100 ml @ 


 200 mls/hr  Q12HR  6/7/20 21:00


 6/25/20 08:56 DC 6/25/20 08:27


200 MLS/HR


 


 Meropenem 500 mg/


 Sodium Chloride  50 ml @ 


 100 mls/hr  Q6HRS  6/28/20 18:00


 7/21/20 08:23 DC 7/21/20 06:15


100 MLS/HR


 


 Methylprednisolone


 Sodium Succinate


  (SOLU-Medrol


 125MG VIAL)  125 mg  1X  ONCE  6/13/20 06:15


 6/13/20 06:16 DC 6/13/20 06:26


125 MG


 


 Metoclopramide HCl


  (Reglan Vial)  10 mg  PRN Q3HRS  PRN  5/9/20 16:45


    5/14/20 04:25


10 MG


 


 Metoprolol


 Tartrate


  (Lopressor Vial)  5 mg  1X  ONCE  8/9/20 10:45


 8/9/20 10:46 DC 8/9/20 10:54


5 MG


 


 Metronidazole  100 ml @ 


 100 mls/hr  Q12HR  8/10/20 10:00


    8/11/20 20:38


100 MLS/HR


 


 Micafungin Sodium


 100 mg/Dextrose  100 ml @ 


 100 mls/hr  Q24H  6/30/20 08:30


 8/5/20 08:20 DC 8/4/20 09:30


100 MLS/HR


 


 Midazolam HCl


  (Versed)  2 mg  1X  ONCE  6/7/20 15:00


 6/7/20 15:01 DC 6/7/20 15:28


1 MG


 


 Midazolam HCl 100


 mg/Sodium Chloride  100 ml @ 1


 mls/hr  CONT  PRN  6/30/20 14:45


 8/3/20 09:45 DC 7/3/20 18:48


10 MLS/HR


 


 Midazolam HCl 50


 mg/Sodium Chloride  50 ml @ 0


 mls/hr  CONT  PRN  3/23/20 08:15


 3/28/20 15:59 DC 3/26/20 22:39


7 MLS/HR


 


 Morphine Sulfate


  (Morphine


 Sulfate)  1 mg  PRN Q1HR  PRN  6/30/20 14:45


 8/3/20 09:45 DC 7/25/20 18:28


1 MG


 


 Multi-Ingred


 Cream/Lotion/Oil/


 Oint


  (Artificial


 Tears Eye


 Ointment)  1 thomas  PRN Q1HR  PRN  3/25/20 17:30


 6/3/20 14:39 DC 4/13/20 08:19


1 THOMAS


 


 Multivitamins/


 Minerals


 Therapeutic


  (Centrum


 Multivit-Mineral


 Liq)  5 ml  DAILY  8/5/20 09:00


    8/11/20 10:08


5 ML


 


 Naloxone HCl


  (Narcan)  0.4 mg  PRN Q2MIN  PRN  6/30/20 14:45


 8/3/20 09:45 DC  





 


 Norepinephrine


 Bitartrate 8 mg/


 Dextrose  258 ml @ 


 13.332 mls/


 hr  CONT  PRN  6/7/20 06:30


 8/3/20 09:45 DC 7/2/20 09:09


1.6 MLS/HR


 


 Ondansetron HCl


  (Zofran)  4 mg  STK-MED ONCE  6/30/20 13:33


 6/30/20 13:33 DC  





 


 Pantoprazole


 Sodium


  (PROTONIX VIAL


 for IV PUSH)  40 mg  DAILYAC  3/16/20 11:30


    8/11/20 09:42


40 MG


 


 Phenylephrine HCl


  (Ken-Synephrine


 Inj)  10 mg  STK-MED ONCE  6/30/20 13:33


 6/30/20 13:33 DC  





 


 Phenylephrine HCl


  (PHENYLEPHRINE


 in 0.9% NACL PF)  1 mg  STK-MED ONCE  6/30/20 14:44


 6/30/20 14:45 DC  





 


 Piperacillin Sod/


 Tazobactam Sod


 3.375 gm/Sodium


 Chloride  50 ml @ 


 100 mls/hr  Q6HRS  5/27/20 12:00


 6/4/20 07:26 DC 6/4/20 06:10


100 MLS/HR


 


 Piperacillin Sod/


 Tazobactam Sod


 4.5 gm/Sodium


 Chloride  100 ml @ 


 200 mls/hr  1X  ONCE  3/16/20 06:00


 3/16/20 06:29 DC 3/16/20 05:44


200 MLS/HR


 


 Potassium


 Chloride 110 meq/


 Magnesium Sulfate


 20 meq/


 Multivitamins 10


 ml/Chromium/


 Copper/Manganese/


 Seleni/Zn 1 ml/


 Insulin Human


 Regular 15 unit/


 Total Parenteral


 Nutrition/Amino


 Acids/Dextrose/


 Fat Emulsion


 Intravenous  1,800 ml @ 


 75 mls/hr  TPN  CONT  5/24/20 22:00


 5/25/20 21:59 DC 5/24/20 22:48


75 MLS/HR


 


 Potassium


 Chloride 15 meq/


 Bicarbonate


 Dialysis Soln w/


 out KCl  5,007.5 ml


  @ 1,000 mls/


 hr  Q5H1M  3/29/20 20:00


 4/2/20 13:08 DC 4/1/20 18:14


1,000 MLS/HR


 


 Potassium


 Chloride 20 meq/


 Bicarbonate


 Dialysis Soln w/


 out KCl  5,010 ml @ 


 1,000 mls/hr  Q5H1M  3/25/20 16:00


 3/29/20 19:59 DC 3/29/20 14:54


1,000 MLS/HR


 


 Potassium


 Chloride 40 meq/


 Potassium Acetate


 60 meq/Magnesium


 Sulfate 10 meq/


 Multivitamins 10


 ml/Chromium/


 Copper/Manganese/


 Seleni/Zn 1 ml/


 Insulin Human


 Regular 20 unit/


 Total Parenteral


 Nutrition/Amino


 Acids/Dextrose/


 Fat Emulsion


 Intravenous  1,800 ml @ 


 75 mls/hr  TPN  CONT  6/4/20 22:00


 6/5/20 21:59 DC 6/5/20 00:03


75 MLS/HR


 


 Potassium


 Chloride 70 meq/


 Magnesium Sulfate


 20 meq/


 Multivitamins 10


 ml/Chromium/


 Copper/Manganese/


 Seleni/Zn 1 ml/


 Insulin Human


 Regular 15 unit/


 Total Parenteral


 Nutrition/Amino


 Acids/Dextrose/


 Fat Emulsion


 Intravenous  1,800 ml @ 


 75 mls/hr  TPN  CONT  5/29/20 22:00


 5/30/20 21:59 DC 5/29/20 23:13


75 MLS/HR


 


 Potassium


 Chloride 75 meq/


 Magnesium Sulfate


 15 meq/


 Multivitamins 10


 ml/Chromium/


 Copper/Manganese/


 Seleni/Zn 0.5 ml/


 Insulin Human


 Regular 15 unit/


 Total Parenteral


 Nutrition/Amino


 Acids/Dextrose/


 Fat Emulsion


 Intravenous  1,920 ml @ 


 80 mls/hr  TPN  CONT  5/9/20 22:00


 5/10/20 21:59 DC 5/9/20 22:41


80 MLS/HR


 


 Potassium


 Chloride 75 meq/


 Magnesium Sulfate


 15 meq/Calcium


 Gluconate 8 meq/


 Multivitamins 10


 ml/Chromium/


 Copper/Manganese/


 Seleni/Zn 0.5 ml/


 Insulin Human


 Regular 15 unit/


 Total Parenteral


 Nutrition/Amino


 Acids/Dextrose/


 Fat Emulsion


 Intravenous  1,920 ml @ 


 80 mls/hr  TPN  CONT  5/7/20 22:00


 5/8/20 21:59 DC 5/7/20 22:28


80 MLS/HR


 


 Potassium


 Chloride 75 meq/


 Magnesium Sulfate


 15 meq/Calcium


 Gluconate 8 meq/


 Multivitamins 10


 ml/Chromium/


 Copper/Manganese/


 Seleni/Zn 0.5 ml/


 Insulin Human


 Regular 20 unit/


 Total Parenteral


 Nutrition/Amino


 Acids/Dextrose/


 Fat Emulsion


 Intravenous  1,920 ml @ 


 80 mls/hr  TPN  CONT  5/6/20 22:00


 5/7/20 21:59 DC 5/6/20 22:00


80 MLS/HR


 


 Potassium


 Chloride 75 meq/


 Magnesium Sulfate


 15 meq/Calcium


 Gluconate 8 meq/


 Multivitamins 10


 ml/Chromium/


 Copper/Manganese/


 Seleni/Zn 0.5 ml/


 Insulin Human


 Regular 25 unit/


 Total Parenteral


 Nutrition/Amino


 Acids/Dextrose/


 Fat Emulsion


 Intravenous  1,920 ml @ 


 80 mls/hr  TPN  CONT  5/4/20 22:00


 5/5/20 21:59 DC 5/4/20 23:08


80 MLS/HR


 


 Potassium


 Chloride 75 meq/


 Magnesium Sulfate


 20 meq/Calcium


 Gluconate 10 meq/


 Multivitamins 10


 ml/Chromium/


 Copper/Manganese/


 Seleni/Zn 0.5 ml/


 Insulin Human


 Regular 25 unit/


 Total Parenteral


 Nutrition/Amino


 Acids/Dextrose/


 Fat Emulsion


 Intravenous  1,920 ml @ 


 80 mls/hr  TPN  CONT  5/3/20 22:00


 5/4/20 21:59 DC 5/3/20 22:04


80 MLS/HR


 


 Potassium


 Chloride 75 meq/


 Magnesium Sulfate


 20 meq/Calcium


 Gluconate 10 meq/


 Multivitamins 10


 ml/Chromium/


 Copper/Manganese/


 Seleni/Zn 0.5 ml/


 Insulin Human


 Regular 30 unit/


 Total Parenteral


 Nutrition/Amino


 Acids/Dextrose/


 Fat Emulsion


 Intravenous  1,920 ml @ 


 80 mls/hr  TPN  CONT  5/2/20 22:00


 5/3/20 22:00 DC 5/2/20 21:51


80 MLS/HR


 


 Potassium


 Chloride 80 meq/


 Magnesium Sulfate


 20 meq/


 Multivitamins 10


 ml/Chromium/


 Copper/Manganese/


 Seleni/Zn 0.5 ml/


 Insulin Human


 Regular 15 unit/


 Total Parenteral


 Nutrition/Amino


 Acids/Dextrose/


 Fat Emulsion


 Intravenous  1,920 ml @ 


 80 mls/hr  TPN  CONT  5/12/20 22:00


 5/13/20 21:59 DC 5/12/20 21:40


80 MLS/HR


 


 Potassium


 Chloride 80 meq/


 Magnesium Sulfate


 20 meq/


 Multivitamins 10


 ml/Chromium/


 Copper/Manganese/


 Seleni/Zn 1 ml/


 Insulin Human


 Regular 15 unit/


 Total Parenteral


 Nutrition/Amino


 Acids/Dextrose/


 Fat Emulsion


 Intravenous  1,800 ml @ 


 75 mls/hr  TPN  CONT  5/31/20 22:00


 6/1/20 21:59 DC 5/31/20 21:54


75 MLS/HR


 


 Potassium


 Chloride 90 meq/


 Magnesium Sulfate


 20 meq/


 Multivitamins 10


 ml/Chromium/


 Copper/Manganese/


 Seleni/Zn 1 ml/


 Insulin Human


 Regular 15 unit/


 Total Parenteral


 Nutrition/Amino


 Acids/Dextrose/


 Fat Emulsion


 Intravenous  1,800 ml @ 


 75 mls/hr  TPN  CONT  5/20/20 22:00


 5/21/20 21:59 DC 5/20/20 22:28


75 MLS/HR


 


 Potassium


 Chloride 90 meq/


 Magnesium Sulfate


 20 meq/


 Multivitamins 10


 ml/Chromium/


 Copper/Manganese/


 Seleni/Zn 1 ml/


 Insulin Human


 Regular 20 unit/


 Total Parenteral


 Nutrition/Amino


 Acids/Dextrose/


 Fat Emulsion


 Intravenous  1,800 ml @ 


 75 mls/hr  TPN  CONT  6/3/20 22:00


 6/4/20 21:59 DC 6/3/20 23:13


75 MLS/HR


 


 Potassium


 Chloride/Water  100 ml @ 


 100 mls/hr  Q1H  6/17/20 08:00


 6/17/20 09:59 DC 6/17/20 09:12


100 MLS/HR


 


 Potassium


 Phosphate 20 mmol/


 Sodium Chloride  106.6667


 ml @ 


 51.667 m...  1X  ONCE  3/25/20 13:00


 3/25/20 15:03 DC 3/25/20 12:51


51.667 MLS/HR


 


 Potassium Acetate


 30 meq/Magnesium


 Sulfate 14 meq/


 Multivitamins 10


 ml/Chromium/


 Copper/Manganese/


 Seleni/Zn 1 ml/


 Insulin Human


 Regular 15 unit/


 Sodium Chloride


 20 meq/Potassium


 Chloride 30 meq/


 Total Parenteral


 Nutrition/Amino


 Acids/Dextrose/


 Fat Emulsion


 Intravenous  1,920 ml @ 


 80 mls/hr  TPN  CONT  6/23/20 22:00


 6/24/20 21:59 DC 6/23/20 21:46


80 MLS/HR


 


 Potassium Acetate


 30 meq/Magnesium


 Sulfate 20 meq/


 Calcium Gluconate


 10 meq/


 Multivitamins 10


 ml/Chromium/


 Copper/Manganese/


 Seleni/Zn 0.5 ml/


 Insulin Human


 Regular 30 unit/


 Potassium


 Chloride 30 meq/


 Total Parenteral


 Nutrition/Amino


 Acids/Dextrose/


 Fat Emulsion


 Intravenous  1,920 ml @ 


 80 mls/hr  TPN  CONT  5/1/20 22:00


 5/2/20 21:59 DC 5/1/20 22:34


80 MLS/HR


 


 Potassium Acetate


 40 meq/Magnesium


 Sulfate 10 meq/


 Multivitamins 10


 ml/Chromium/


 Copper/Manganese/


 Seleni/Zn 1 ml/


 Insulin Human


 Regular 20 unit/


 Total Parenteral


 Nutrition/Amino


 Acids/Dextrose/


 Fat Emulsion


 Intravenous  1,920 ml @ 


 80 mls/hr  TPN  CONT  6/16/20 22:00


 6/17/20 21:59 DC 6/16/20 21:32


80 MLS/HR


 


 Potassium Acetate


 40 meq/Magnesium


 Sulfate 5 meq/


 Multivitamins 10


 ml/Chromium/


 Copper/Manganese/


 Seleni/Zn 1 ml/


 Insulin Human


 Regular 30 unit/


 Total Parenteral


 Nutrition/Amino


 Acids/Dextrose/


 Fat Emulsion


 Intravenous  1,920 ml @ 


 80 mls/hr  TPN  CONT  6/15/20 22:00


 6/16/20 19:34 DC 6/15/20 21:54


80 MLS/HR


 


 Potassium Acetate


 55 meq/Magnesium


 Sulfate 20 meq/


 Calcium Gluconate


 10 meq/


 Multivitamins 10


 ml/Chromium/


 Copper/Manganese/


 Seleni/Zn 0.5 ml/


 Insulin Human


 Regular 30 unit/


 Total Parenteral


 Nutrition/Amino


 Acids/Dextrose/


 Fat Emulsion


 Intravenous  1,920 ml @ 


 80 mls/hr  TPN  CONT  4/30/20 22:00


 5/1/20 21:59 DC 5/1/20 01:00


80 MLS/HR


 


 Potassium Acetate


 55 meq/Magnesium


 Sulfate 20 meq/


 Calcium Gluconate


 10 meq/


 Multivitamins 10


 ml/Chromium/


 Copper/Manganese/


 Seleni/Zn 0.5 ml/


 Insulin Human


 Regular 35 unit/


 Total Parenteral


 Nutrition/Amino


 Acids/Dextrose/


 Fat Emulsion


 Intravenous  1,920 ml @ 


 80 mls/hr  TPN  CONT  4/28/20 22:00


 4/29/20 21:59 DC 4/28/20 22:02


80 MLS/HR


 


 Potassium Acetate


 60 meq/Magnesium


 Sulfate 10 meq/


 Multivitamins 10


 ml/Chromium/


 Copper/Manganese/


 Seleni/Zn 1 ml/


 Insulin Human


 Regular 20 unit/


 Total Parenteral


 Nutrition/Amino


 Acids/Dextrose/


 Fat Emulsion


 Intravenous  1,920 ml @ 


 80 mls/hr  TPN  CONT  6/17/20 22:00


 6/18/20 21:59 DC 6/17/20 21:55


80 MLS/HR


 


 Potassium Acetate


 60 meq/Magnesium


 Sulfate 14 meq/


 Multivitamins 10


 ml/Chromium/


 Copper/Manganese/


 Seleni/Zn 1 ml/


 Insulin Human


 Regular 15 unit/


 Sodium Chloride


 20 meq/Total


 Parenteral


 Nutrition/Amino


 Acids/Dextrose/


 Fat Emulsion


 Intravenous  1,920 ml @ 


 80 mls/hr  TPN  CONT  6/22/20 22:00


 6/23/20 21:59 DC 6/22/20 21:54


80 MLS/HR


 


 Potassium Acetate


 60 meq/Magnesium


 Sulfate 14 meq/


 Multivitamins 10


 ml/Chromium/


 Copper/Manganese/


 Seleni/Zn 1 ml/


 Insulin Human


 Regular 15 unit/


 Total Parenteral


 Nutrition/Amino


 Acids/Dextrose/


 Fat Emulsion


 Intravenous  1,920 ml @ 


 80 mls/hr  TPN  CONT  6/21/20 22:00


 6/22/20 21:59 DC 6/21/20 22:22


80 MLS/HR


 


 Potassium Acetate


 60 meq/Magnesium


 Sulfate 14 meq/


 Multivitamins 10


 ml/Chromium/


 Copper/Manganese/


 Seleni/Zn 1 ml/


 Insulin Human


 Regular 20 unit/


 Total Parenteral


 Nutrition/Amino


 Acids/Dextrose/


 Fat Emulsion


 Intravenous  1,920 ml @ 


 80 mls/hr  TPN  CONT  6/18/20 22:00


 6/19/20 21:59 DC 6/18/20 22:26


80 MLS/HR


 


 Potassium Acetate


 60 meq/Magnesium


 Sulfate 5 meq/


 Multivitamins 10


 ml/Chromium/


 Copper/Manganese/


 Seleni/Zn 1 ml/


 Insulin Human


 Regular 30 unit/


 Total Parenteral


 Nutrition/Amino


 Acids/Dextrose/


 Fat Emulsion


 Intravenous  1,920 ml @ 


 80 mls/hr  TPN  CONT  6/6/20 22:00


 6/7/20 21:59 DC 6/6/20 21:54


80 MLS/HR


 


 Potassium Acetate


 65 meq/Magnesium


 Sulfate 20 meq/


 Calcium Gluconate


 10 meq/


 Multivitamins 10


 ml/Chromium/


 Copper/Manganese/


 Seleni/Zn 0.5 ml/


 Insulin Human


 Regular 30 unit/


 Total Parenteral


 Nutrition/Amino


 Acids/Dextrose/


 Fat Emulsion


 Intravenous  1,920 ml @ 


 80 mls/hr  TPN  CONT  4/29/20 22:00


 4/30/20 21:59 DC 4/29/20 22:22


80 MLS/HR


 


 Potassium Acetate


 80 meq/Magnesium


 Sulfate 5 meq/


 Multivitamins 10


 ml/Chromium/


 Copper/Manganese/


 Seleni/Zn 1 ml/


 Insulin Human


 Regular 20 unit/


 Total Parenteral


 Nutrition/Amino


 Acids/Dextrose/


 Fat Emulsion


 Intravenous  1,920 ml @ 


 80 mls/hr  TPN  CONT  6/5/20 22:00


 6/6/20 21:59 DC 6/5/20 21:59


80 MLS/HR


 


 Prochlorperazine


 Edisylate


  (Compazine)  5 mg  PACU PRN  PRN  4/27/20 07:00


 4/28/20 06:59 DC  





 


 Propofol


  (Diprivan)  200 mg  STK-MED ONCE  6/30/20 07:44


 6/30/20 07:44 DC  





 


 Ringer's Solution  1,000 ml @ 


 150 mls/hr  Q6H40M  8/11/20 14:30


    8/12/20 03:32


150 MLS/HR


 


 Rocuronium Bromide


  (Zemuron)  100 mg  STK-MED ONCE  6/30/20 07:44


 6/30/20 07:44 DC  





 


 Saliva Substitute


  (Biotene


 Moisturizing


 Mouth)  2 spray  PRN Q15MIN  PRN  5/21/20 11:00


     





 


 Sevoflurane


  (Ultane)  60 ml  STK-MED ONCE  4/27/20 12:26


 4/27/20 12:27 DC  





 


 Sodium


 Bicarbonate 150


 meq/Dextrose  1,150 ml @ 


 75 mls/hr  1X  ONCE  6/30/20 16:30


 7/1/20 07:49 DC 6/30/20 20:02


75 MLS/HR


 


 Sodium


 Bicarbonate 50


 meq/Sodium


 Chloride  1,050 ml @ 


 75 mls/hr  Q14H  3/18/20 07:30


 3/23/20 10:28 DC 3/22/20 21:10


75 MLS/HR


 


 Sodium Acetate 50


 meq/Potassium


 Acetate 55 meq/


 Magnesium Sulfate


 20 meq/Calcium


 Gluconate 10 meq/


 Multivitamins 10


 ml/Chromium/


 Copper/Manganese/


 Seleni/Zn 0.5 ml/


 Insulin Human


 Regular 35 unit/


 Total Parenteral


 Nutrition/Amino


 Acids/Dextrose/


 Fat Emulsion


 Intravenous  1,800 ml @ 


 75 mls/hr  TPN  CONT  4/25/20 22:00


 4/26/20 21:59 DC 4/25/20 22:03


75 MLS/HR


 


 Sodium Bicarbonate


  (Sodium Bicarb


 Adult 8.4% Syr)  50 meq  STK-MED ONCE  8/11/20 14:30


 8/11/20 14:30 DC  





 


 Sodium Chloride  1,000 ml @ 


 1,000 mls/hr  1X  ONCE  8/11/20 14:00


 8/11/20 14:59 DC 8/11/20 14:00


1,000 MLS/HR


 


 Sodium Chloride


  (Normal Saline


 Flush)  3 ml  QSHIFT  PRN  6/30/20 14:45


 8/3/20 09:45 DC  





 


 Sodium Chloride


 80 meq/Potassium


 Chloride 30 meq/


 Potassium Acetate


 30 meq/Magnesium


 Sulfate 14 meq/


 Multivitamins 10


 ml/Chromium/


 Copper/Manganese/


 Seleni/Zn 1 ml/


 Insulin Human


 Regular 15 unit/


 Total Parenteral


 Nutrition/Amino


 Acids/Dextrose/


 Fat Emulsion


 Intravenous  1,920 ml @ 


 80 mls/hr  TPN  CONT  7/1/20 22:00


 7/2/20 21:59 DC 7/1/20 23:05


80 MLS/HR


 


 Sodium Chloride


 90 meq/Calcium


 Gluconate 10 meq/


 Multivitamins 10


 ml/Chromium/


 Copper/Manganese/


 Seleni/Zn 0.5 ml/


 Total Parenteral


 Nutrition/Amino


 Acids/Dextrose/


 Fat Emulsion


 Intravenous  1,512 ml @ 


 63 mls/hr  TPN  CONT  3/18/20 22:00


 3/19/20 21:59 DC 3/18/20 22:06


63 MLS/HR


 


 Sodium Chloride


 90 meq/Calcium


 Gluconate 10 meq/


 Multivitamins 10


 ml/Chromium/


 Copper/Manganese/


 Seleni/Zn 1 ml/


 Total Parenteral


 Nutrition/Amino


 Acids/Dextrose/


 Fat Emulsion


 Intravenous  55.005 ml


  @ 2.292


 mls/hr  TPN  CONT  3/18/20 22:00


 3/18/20 12:33 DC  





 


 Sodium Chloride


 90 meq/Magnesium


 Sulfate 10 meq/


 Calcium Gluconate


 20 meq/


 Multivitamins 10


 ml/Chromium/


 Copper/Manganese/


 Seleni/Zn 0.5 ml/


 Total Parenteral


 Nutrition/Amino


 Acids/Dextrose/


 Fat Emulsion


 Intravenous  1,512 ml @ 


 63 mls/hr  TPN  CONT  3/19/20 22:00


 3/20/20 21:59 DC 3/19/20 22:25


63 MLS/HR


 


 Sodium Chloride


 90 meq/Magnesium


 Sulfate 12 meq/


 Calcium Gluconate


 15 meq/


 Multivitamins 10


 ml/Chromium/


 Copper/Manganese/


 Seleni/Zn 0.5 ml/


 Insulin Human


 Regular 25 unit/


 Total Parenteral


 Nutrition/Amino


 Acids/Dextrose/


 Fat Emulsion


 Intravenous  1,400 ml @ 


 58.333 mls/


 hr  TPN  CONT  4/8/20 22:00


 4/9/20 21:59 DC 4/8/20 21:41


58.333 MLS/HR


 


 Sodium Chloride


 90 meq/Potassium


 Chloride 15 meq/


 Magnesium Sulfate


 12 meq/Calcium


 Gluconate 15 meq/


 Multivitamins 10


 ml/Chromium/


 Copper/Manganese/


 Seleni/Zn 0.5 ml/


 Insulin Human


 Regular 25 unit/


 Total Parenteral


 Nutrition/Amino


 Acids/Dextrose/


 Fat Emulsion


 Intravenous  1,400 ml @ 


 58.333 mls/


 hr  TPN  CONT  4/7/20 22:00


 4/8/20 21:59 DC 4/7/20 22:13


58.333 MLS/HR


 


 Sodium Chloride


 90 meq/Potassium


 Chloride 15 meq/


 Potassium


 Phosphate 10 mmol/


 Magnesium Sulfate


 8 meq/Calcium


 Gluconate 15 meq/


 Multivitamins 10


 ml/Chromium/


 Copper/Manganese/


 Seleni/Zn 0.5 ml/


 Insulin Human


 Regular 25 unit/


 Total Parenteral


 Nutrition/Amino


 Acids/Dextrose/


 Fat Emulsion


 Intravenous  1,400 ml @ 


 58.333 mls/


 hr  TPN  CONT  4/5/20 22:00


 4/6/20 21:59 DC 4/5/20 21:20


58.333 MLS/HR


 


 Sodium Chloride


 90 meq/Potassium


 Chloride 15 meq/


 Potassium


 Phosphate 10 mmol/


 Magnesium Sulfate


 10 meq/Calcium


 Gluconate 20 meq/


 Multivitamins 10


 ml/Chromium/


 Copper/Manganese/


 Seleni/Zn 0.5 ml/


 Total Parenteral


 Nutrition/Amino


 Acids/Dextrose/


 Fat Emulsion


 Intravenous  1,400 ml @ 


 58.333 mls/


 hr  TPN  CONT  3/23/20 22:00


 3/24/20 21:59 DC 3/23/20 21:42


58.333 MLS/HR


 


 Sodium Chloride


 90 meq/Potassium


 Chloride 15 meq/


 Potassium


 Phosphate 10 mmol/


 Magnesium Sulfate


 12 meq/Calcium


 Gluconate 15 meq/


 Multivitamins 10


 ml/Chromium/


 Copper/Manganese/


 Seleni/Zn 0.5 ml/


 Insulin Human


 Regular 25 unit/


 Total Parenteral


 Nutrition/Amino


 Acids/Dextrose/


 Fat Emulsion


 Intravenous  1,400 ml @ 


 58.333 mls/


 hr  TPN  CONT  4/6/20 22:00


 4/7/20 21:59 DC 4/6/20 22:24


58.333 MLS/HR


 


 Sodium Chloride


 90 meq/Potassium


 Chloride 15 meq/


 Potassium


 Phosphate 15 mmol/


 Magnesium Sulfate


 10 meq/Calcium


 Gluconate 15 meq/


 Multivitamins 10


 ml/Chromium/


 Copper/Manganese/


 Seleni/Zn 0.5 ml/


 Total Parenteral


 Nutrition/Amino


 Acids/Dextrose/


 Fat Emulsion


 Intravenous  1,400 ml @ 


 58.333 mls/


 hr  TPN  CONT  3/24/20 22:00


 3/25/20 21:59 DC 3/24/20 22:17


58.333 MLS/HR


 


 Sodium Chloride


 90 meq/Potassium


 Chloride 15 meq/


 Potassium


 Phosphate 15 mmol/


 Magnesium Sulfate


 10 meq/Calcium


 Gluconate 20 meq/


 Multivitamins 10


 ml/Chromium/


 Copper/Manganese/


 Seleni/Zn 0.5 ml/


 Total Parenteral


 Nutrition/Amino


 Acids/Dextrose/


 Fat Emulsion


 Intravenous  1,200 ml @ 


 50 mls/hr  TPN  CONT  3/22/20 22:00


 3/22/20 14:17 DC  





 


 Sodium Chloride


 90 meq/Potassium


 Chloride 15 meq/


 Potassium


 Phosphate 18 mmol/


 Magnesium Sulfate


 8 meq/Calcium


 Gluconate 15 meq/


 Multivitamins 10


 ml/Chromium/


 Copper/Manganese/


 Seleni/Zn 0.5 ml/


 Insulin Human


 Regular 10 unit/


 Total Parenteral


 Nutrition/Amino


 Acids/Dextrose/


 Fat Emulsion


 Intravenous  1,400 ml @ 


 58.333 mls/


 hr  TPN  CONT  3/27/20 22:00


 3/28/20 21:59 DC 3/27/20 21:43


58.333 MLS/HR


 


 Sodium Chloride


 90 meq/Potassium


 Chloride 15 meq/


 Potassium


 Phosphate 18 mmol/


 Magnesium Sulfate


 8 meq/Calcium


 Gluconate 15 meq/


 Multivitamins 10


 ml/Chromium/


 Copper/Manganese/


 Seleni/Zn 0.5 ml/


 Insulin Human


 Regular 15 unit/


 Total Parenteral


 Nutrition/Amino


 Acids/Dextrose/


 Fat Emulsion


 Intravenous  1,400 ml @ 


 58.333 mls/


 hr  TPN  CONT  3/30/20 22:00


 3/31/20 21:59 DC 3/30/20 21:47


58.333 MLS/HR


 


 Sodium Chloride


 90 meq/Potassium


 Chloride 15 meq/


 Potassium


 Phosphate 18 mmol/


 Magnesium Sulfate


 8 meq/Calcium


 Gluconate 15 meq/


 Multivitamins 10


 ml/Chromium/


 Copper/Manganese/


 Seleni/Zn 0.5 ml/


 Insulin Human


 Regular 20 unit/


 Total Parenteral


 Nutrition/Amino


 Acids/Dextrose/


 Fat Emulsion


 Intravenous  1,400 ml @ 


 58.333 mls/


 hr  TPN  CONT  4/2/20 22:00


 4/3/20 21:59 DC 4/2/20 22:45


58.333 MLS/HR


 


 Sodium Chloride


 90 meq/Potassium


 Chloride 15 meq/


 Potassium


 Phosphate 18 mmol/


 Magnesium Sulfate


 8 meq/Calcium


 Gluconate 15 meq/


 Multivitamins 10


 ml/Chromium/


 Copper/Manganese/


 Seleni/Zn 0.5 ml/


 Total Parenteral


 Nutrition/Amino


 Acids/Dextrose/


 Fat Emulsion


 Intravenous  1,400 ml @ 


 58.333 mls/


 hr  TPN  CONT  3/26/20 22:00


 3/27/20 21:59 DC 3/26/20 22:00


58.333 MLS/HR


 


 Sodium Chloride


 90 meq/Potassium


 Chloride 30 meq/


 Potassium Acetate


 30 meq/Magnesium


 Sulfate 15 meq/


 Multivitamins 10


 ml/Chromium/


 Copper/Manganese/


 Seleni/Zn 1 ml/


 Insulin Human


 Regular 15 unit/


 Total Parenteral


 Nutrition/Amino


 Acids/Dextrose/


 Fat Emulsion


 Intravenous  1,680 ml @ 


 70 mls/hr  TPN  CONT  7/16/20 22:00


 7/17/20 21:59 DC 7/16/20 22:06


70 MLS/HR


 


 Sodium Chloride


 90 meq/Potassium


 Phosphate 15 mmol/


 Magnesium Sulfate


 12 meq/Calcium


 Gluconate 15 meq/


 Multivitamins 10


 ml/Chromium/


 Copper/Manganese/


 Seleni/Zn 0.5 ml/


 Insulin Human


 Regular 30 unit/


 Total Parenteral


 Nutrition/Amino


 Acids/Dextrose/


 Fat Emulsion


 Intravenous  1,400 ml @ 


 58.333 mls/


 hr  TPN  CONT  4/10/20 22:00


 4/11/20 21:59 DC 4/10/20 21:49


58.333 MLS/HR


 


 Sodium Chloride


 90 meq/Potassium


 Phosphate 15 mmol/


 Magnesium Sulfate


 12 meq/Calcium


 Gluconate 15 meq/


 Multivitamins 10


 ml/Chromium/


 Copper/Manganese/


 Seleni/Zn 0.5 ml/


 Insulin Human


 Regular 40 unit/


 Total Parenteral


 Nutrition/Amino


 Acids/Dextrose/


 Fat Emulsion


 Intravenous  1,400 ml @ 


 58.333 mls/


 hr  TPN  CONT  4/11/20 22:00


 4/12/20 21:59 DC 4/11/20 21:21


58.333 MLS/HR


 


 Sodium Chloride


 90 meq/Potassium


 Phosphate 19 mmol/


 Magnesium Sulfate


 12 meq/Calcium


 Gluconate 15 meq/


 Multivitamins 10


 ml/Chromium/


 Copper/Manganese/


 Seleni/Zn 0.5 ml/


 Insulin Human


 Regular 40 unit/


 Total Parenteral


 Nutrition/Amino


 Acids/Dextrose/


 Fat Emulsion


 Intravenous  1,400 ml @ 


 58.333 mls/


 hr  TPN  CONT  4/12/20 22:00


 4/13/20 21:59 DC 4/12/20 21:54


58.333 MLS/HR


 


 Sodium Chloride


 90 meq/Potassium


 Phosphate 5 mmol/


 Magnesium Sulfate


 12 meq/Calcium


 Gluconate 15 meq/


 Multivitamins 10


 ml/Chromium/


 Copper/Manganese/


 Seleni/Zn 0.5 ml/


 Insulin Human


 Regular 30 unit/


 Total Parenteral


 Nutrition/Amino


 Acids/Dextrose/


 Fat Emulsion


 Intravenous  1,400 ml @ 


 58.333 mls/


 hr  TPN  CONT  4/9/20 22:00


 4/10/20 21:59 DC 4/9/20 22:08


58.333 MLS/HR


 


 Sodium Chloride


 100 meq/Potassium


 Chloride 30 meq/


 Potassium Acetate


 30 meq/Magnesium


 Sulfate 12 meq/


 Multivitamins 10


 ml/Chromium/


 Copper/Manganese/


 Seleni/Zn 1 ml/


 Insulin Human


 Regular 15 unit/


 Total Parenteral


 Nutrition/Amino


 Acids/Dextrose/


 Fat Emulsion


 Intravenous  1,680 ml @ 


 70 mls/hr  TPN  CONT  7/5/20 22:00


 7/6/20 21:59 DC 7/5/20 21:23


70 MLS/HR


 


 Sodium Chloride


 100 meq/Potassium


 Chloride 40 meq/


 Magnesium Sulfate


 15 meq/Calcium


 Gluconate 15 meq/


 Multivitamins 10


 ml/Chromium/


 Copper/Manganese/


 Seleni/Zn 0.5 ml/


 Insulin Human


 Regular 35 unit/


 Total Parenteral


 Nutrition/Amino


 Acids/Dextrose/


 Fat Emulsion


 Intravenous  1,400 ml @ 


 58.333 mls/


 hr  TPN  CONT  4/19/20 22:00


 4/20/20 21:59 DC 4/19/20 22:46


58.333 MLS/HR


 


 Sodium Chloride


 100 meq/Potassium


 Chloride 40 meq/


 Magnesium Sulfate


 20 meq/Calcium


 Gluconate 10 meq/


 Multivitamins 10


 ml/Chromium/


 Copper/Manganese/


 Seleni/Zn 0.5 ml/


 Insulin Human


 Regular 35 unit/


 Total Parenteral


 Nutrition/Amino


 Acids/Dextrose/


 Fat Emulsion


 Intravenous  1,400 ml @ 


 58.333 mls/


 hr  TPN  CONT  4/23/20 22:00


 4/24/20 21:59 DC 4/24/20 00:06


58.333 MLS/HR


 


 Sodium Chloride


 100 meq/Potassium


 Chloride 40 meq/


 Magnesium Sulfate


 20 meq/Calcium


 Gluconate 15 meq/


 Multivitamins 10


 ml/Chromium/


 Copper/Manganese/


 Seleni/Zn 0.5 ml/


 Insulin Human


 Regular 35 unit/


 Total Parenteral


 Nutrition/Amino


 Acids/Dextrose/


 Fat Emulsion


 Intravenous  1,400 ml @ 


 58.333 mls/


 hr  TPN  CONT  4/22/20 22:00


 4/23/20 21:59 DC 4/22/20 22:27


58.333 MLS/HR


 


 Sodium Chloride


 100 meq/Potassium


 Phosphate 10 mmol/


 Magnesium Sulfate


 12 meq/Calcium


 Gluconate 15 meq/


 Multivitamins 10


 ml/Chromium/


 Copper/Manganese/


 Seleni/Zn 0.5 ml/


 Insulin Human


 Regular 35 unit/


 Potassium


 Chloride 20 meq/


 Total Parenteral


 Nutrition/Amino


 Acids/Dextrose/


 Fat Emulsion


 Intravenous  1,400 ml @ 


 58.333 mls/


 hr  TPN  CONT  4/16/20 22:00


 4/17/20 21:59 DC 4/16/20 22:10


58.333 MLS/HR


 


 Sodium Chloride


 100 meq/Potassium


 Phosphate 19 mmol/


 Magnesium Sulfate


 12 meq/Calcium


 Gluconate 15 meq/


 Multivitamins 10


 ml/Chromium/


 Copper/Manganese/


 Seleni/Zn 0.5 ml/


 Insulin Human


 Regular 40 unit/


 Potassium


 Chloride 20 meq/


 Total Parenteral


 Nutrition/Amino


 Acids/Dextrose/


 Fat Emulsion


 Intravenous  1,400 ml @ 


 58.333 mls/


 hr  TPN  CONT  4/15/20 22:00


 4/16/20 21:59 DC 4/15/20 21:20


58.333 MLS/HR


 


 Sodium Chloride


 100 meq/Potassium


 Phosphate 5 mmol/


 Magnesium Sulfate


 12 meq/Calcium


 Gluconate 15 meq/


 Multivitamins 10


 ml/Chromium/


 Copper/Manganese/


 Seleni/Zn 0.5 ml/


 Insulin Human


 Regular 35 unit/


 Potassium


 Chloride 20 meq/


 Total Parenteral


 Nutrition/Amino


 Acids/Dextrose/


 Fat Emulsion


 Intravenous  1,400 ml @ 


 58.333 mls/


 hr  TPN  CONT  4/17/20 22:00


 4/18/20 21:59 DC 4/17/20 22:59


58.333 MLS/HR


 


 Sodium Chloride


 110 meq/Potassium


 Chloride 30 meq/


 Potassium Acetate


 30 meq/Magnesium


 Sulfate 15 meq/


 Multivitamins 10


 ml/Chromium/


 Copper/Manganese/


 Seleni/Zn 1 ml/


 Insulin Human


 Regular 15 unit/


 Total Parenteral


 Nutrition/Amino


 Acids/Dextrose/


 Fat Emulsion


 Intravenous  1,680 ml @ 


 70 mls/hr  TPN  CONT  7/18/20 22:00


 7/19/20 21:59 DC 7/18/20 22:01


70 MLS/HR


 


 Sodium Chloride


 110 meq/Sodium


 Phosphate 10 mmol/


 Potassium


 Chloride 30 meq/


 Potassium Acetate


 30 meq/Magnesium


 Sulfate 15 meq/


 Multivitamins 10


 ml/Chromium/


 Copper/Manganese/


 Seleni/Zn 1 ml/


 Insulin Human


 Regular 15 unit/


 Total Parenteral


 Nutrition/Amino


 Acids/Dextrose/


 Fat Emulsion


 Intravenous  1,680 ml @ 


 70 mls/hr  TPN  CONT  7/19/20 22:00


 7/20/20 21:59 DC 7/19/20 22:03


70 MLS/HR


 


 Sodium Chloride


 120 meq/Sodium


 Phosphate 10 mmol/


 Potassium


 Chloride 30 meq/


 Potassium Acetate


 30 meq/Magnesium


 Sulfate 15 meq/


 Multivitamins 10


 ml/Chromium/


 Copper/Manganese/


 Seleni/Zn 1 ml/


 Insulin Human


 Regular 15 unit/


 Total Parenteral


 Nutrition/Amino


 Acids/Dextrose/


 Fat Emulsion


 Intravenous  1,680 ml @ 


 70 mls/hr  TPN  CONT  7/26/20 22:00


 7/27/20 21:59 Cancel  





 


 Succinylcholine


 Chloride


  (Anectine)  120 mg  1X  ONCE  3/23/20 08:30


 3/23/20 08:31 DC 3/23/20 08:34


120 MG


 


 Vancomycin HCl


  (Vanco Per


 Pharmacy)  1 each  PRN DAILY  PRN  7/12/20 09:15


 7/15/20 07:41 DC 7/14/20 02:46


1 EACH


 


 Vancomycin HCl


  (Vancomycin


 Random Level)  1 each  1X  ONCE  7/14/20 01:00


 7/14/20 01:01 DC 7/14/20 01:00


1 EACH


 


 Vancomycin HCl


  (Vancomycin


 Trough Level)  1 each  1X  ONCE  7/15/20 09:30


 7/15/20 09:31 Cancel  





 


 Vancomycin HCl


 1.5 gm/Sodium


 Chloride  500 ml @ 


 250 mls/hr  Q12H  7/14/20 10:00


 7/15/20 07:41 DC 7/14/20 22:07


250 MLS/HR


 


 Vancomycin HCl 2


 gm/Sodium Chloride  500 ml @ 


 250 mls/hr  1X  ONCE  7/12/20 10:00


 7/12/20 11:59 DC 7/12/20 10:34


250 MLS/HR


 


 Vasopressin


  (Vasostrict)  20 unit  STK-MED ONCE  6/30/20 12:23


 6/30/20 12:23 DC  





 


 Vasopressin 20


 unit/Dextrose  101 ml @ 


 12 mls/hr  CONT  PRN  6/30/20 15:30


 8/3/20 09:45 DC 7/7/20 04:17


12 MLS/HR


 


 Vecuronium Bromide


  (Norcuron Bolus)  6 mg  PRN Q6HRS  PRN  5/7/20 19:15


 5/7/20 19:35 DC  





 


 Vitamin A/Vitamin


 D


  (Vitamin A & D


 Ointment)  1 thomas  PRN Q1HR  PRN  8/4/20 09:15


    8/11/20 10:44


1 THOMAS


 


 Zoledronic Acid  100 ml @ 


 400 mls/hr  1X  ONCE  8/7/20 12:00


 8/7/20 12:14 DC 8/7/20 13:04


400 MLS/HR











Labs:


Lab





Laboratory Tests








Test


 8/11/20


12:20 8/11/20


13:42 8/11/20


14:30 8/11/20


18:09


 


Glucose (Fingerstick)


 172 mg/dL


(70-99) 


 


 194 mg/dL


(70-99)


 


O2 Saturation  98 % (92-99)   


 


Arterial Blood pH


 


 7.29


(7.35-7.45) 


 





 


Arterial Blood pCO2 at


Patient Temp 


 35 mmHg


(35-46) 


 





 


Arterial Blood pO2 at Patient


Temp 


 105 mmHg


() 


 





 


Arterial Blood HCO3


 


 17 mmol/L


(21-28) 


 





 


Arterial Blood Base Excess


 


 -9 mmol/L


(-3-3) 


 





 


FiO2  4l n.c.   


 


White Blood Count


 


 


 19.3 x10^3/uL


(4.0-11.0) 





 


Red Blood Count


 


 


 2.64 x10^6/uL


(3.50-5.40) 





 


Hemoglobin


 


 


 7.4 g/dL


(12.0-15.5) 





 


Hematocrit


 


 


 23.4 %


(36.0-47.0) 





 


Mean Corpuscular Volume   89 fL ()  


 


Mean Corpuscular Hemoglobin   28 pg (25-35)  


 


Mean Corpuscular Hemoglobin


Concent 


 


 32 g/dL


(31-37) 





 


Red Cell Distribution Width


 


 


 20.0 %


(11.5-14.5) 





 


Platelet Count


 


 


 439 x10^3/uL


(140-400) 





 


Neutrophils (%) (Auto)   93 % (31-73)  


 


Lymphocytes (%) (Auto)   2 % (24-48)  


 


Monocytes (%) (Auto)   4 % (0-9)  


 


Eosinophils (%) (Auto)   0 % (0-3)  


 


Basophils (%) (Auto)   0 % (0-3)  


 


Neutrophils # (Auto)


 


 


 18.0 x10^3/uL


(1.8-7.7) 





 


Lymphocytes # (Auto)


 


 


 0.4 x10^3/uL


(1.0-4.8) 





 


Monocytes # (Auto)


 


 


 0.8 x10^3/uL


(0.0-1.1) 





 


Eosinophils # (Auto)


 


 


 0.0 x10^3/uL


(0.0-0.7) 





 


Basophils # (Auto)


 


 


 0.0 x10^3/uL


(0.0-0.2) 





 


Sodium Level


 


 


 154 mmol/L


(136-145) 





 


Potassium Level


 


 


 5.2 mmol/L


(3.5-5.1) 





 


Chloride Level


 


 


 117 mmol/L


() 





 


Carbon Dioxide Level


 


 


 29 mmol/L


(21-32) 





 


Anion Gap   8 (6-14)  


 


Blood Urea Nitrogen


 


 


 69 mg/dL


(7-20) 





 


Creatinine


 


 


 2.2 mg/dL


(0.6-1.0) 





 


Estimated GFR


(Cockcroft-Gault) 


 


 23.7 


 





 


BUN/Creatinine Ratio   31 (6-20)  


 


Glucose Level


 


 


 219 mg/dL


(70-99) 





 


Lactic Acid Level


 


 


 3.3 mmol/L


(0.4-2.0) 





 


Calcium Level


 


 


 7.4 mg/dL


(8.5-10.1) 





 


Total Bilirubin


 


 


 0.3 mg/dL


(0.2-1.0) 





 


Aspartate Amino Transf


(AST/SGOT) 


 


 15 U/L (15-37) 


 





 


Alanine Aminotransferase


(ALT/SGPT) 


 


 10 U/L (14-59) 


 





 


Alkaline Phosphatase


 


 


 76 U/L


() 





 


Total Protein


 


 


 4.9 g/dL


(6.4-8.2) 





 


Albumin


 


 


 1.3 g/dL


(3.4-5.0) 





 


Albumin/Globulin Ratio   0.4 (1.0-1.7)  


 


Test


 8/11/20


21:30 8/12/20


00:38 8/12/20


05:15 8/12/20


07:19


 


Lactic Acid Level


 2.8 mmol/L


(0.4-2.0) 


 


 





 


Glucose (Fingerstick)


 


 167 mg/dL


(70-99) 


 166 mg/dL


(70-99)


 


White Blood Count


 


 


 13.8 x10^3/uL


(4.0-11.0) 





 


Red Blood Count


 


 


 2.64 x10^6/uL


(3.50-5.40) 





 


Hemoglobin


 


 


 7.3 g/dL


(12.0-15.5) 





 


Hematocrit


 


 


 23.7 %


(36.0-47.0) 





 


Mean Corpuscular Volume   90 fL ()  


 


Mean Corpuscular Hemoglobin   28 pg (25-35)  


 


Mean Corpuscular Hemoglobin


Concent 


 


 31 g/dL


(31-37) 





 


Red Cell Distribution Width


 


 


 19.6 %


(11.5-14.5) 





 


Platelet Count


 


 


 397 x10^3/uL


(140-400) 





 


Neutrophils (%) (Auto)   87 % (31-73)  


 


Lymphocytes (%) (Auto)   7 % (24-48)  


 


Monocytes (%) (Auto)   5 % (0-9)  


 


Eosinophils (%) (Auto)   1 % (0-3)  


 


Basophils (%) (Auto)   0 % (0-3)  


 


Neutrophils # (Auto)


 


 


 12.0 x10^3/uL


(1.8-7.7) 





 


Lymphocytes # (Auto)


 


 


 0.9 x10^3/uL


(1.0-4.8) 





 


Monocytes # (Auto)


 


 


 0.7 x10^3/uL


(0.0-1.1) 





 


Eosinophils # (Auto)


 


 


 0.2 x10^3/uL


(0.0-0.7) 





 


Basophils # (Auto)


 


 


 0.0 x10^3/uL


(0.0-0.2) 











Micro


        NEG ANSON 56


        PSEUDOMONAS AERUGINOSA


        ANTIBIOTIC                        RESULT          INTERPRETATION


        AMIKACIN                          <=16                  S


        AZTREONAM                         >16                   R


        CEFTAZIDIME                       >16                   R


        CIPROFLOXACIN                     <=0.25                S


        CEFEPIME                          16                    I


        GENTAMICIN                        <=2                   S


        LEVOFLOXACIN                      <=0.5                 S














                               ** CONTINUED ON NEXT PAGE **





-----------------------------------------------------

---------------------------------------





RUN DATE: 06/10/20                  [facility] *LIVE*               

  PAGE 2   


RUN TIME: 1121                            Specimen Inquiry                    


-------------------------------------------

-------------------------------------------------





SPEC: [id number]    PATIENT: JESENIA BEAN                [id number]  

(Continued)


 

--------------------------------------------------------------------------------


------------








---------------------

-----------------------------------------------------------------------





  Procedure                         Result                                      

         


 

--------------------------------------------------------------------------------


------------





  ANTIMICROBIAL SUSCEPTIBILITY  Preliminary   (continued)


        MEROPENEM                         <=1                   S


        PIPERACILLIN/TAZOBACTAM           64                    S


        TOBRAMYCIN                        <=2                   S


        Unless otherwise specified, Testing Performed by:


        [address] [address]


        For Inquires, the Physician may contact the Microbiology


        department at [phone]





-

--------------------------------------------------------------------------------


-----------





Objective:


Assessment:


Patient with prolonged hospitalization more than 4 months


Multiple medical problems


Multiple surgical procedures





  


Intermittent fevers now improving


S/P Exp. 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 

parapsilosis & PSAE (I-merrem/ceftazidime/AZT/cefepime))


Leukocytosis - better


Loose stool on tube feed - WBC down and no gross fever


Anemia


Acute gallstone pancreatitis with persistent necrosis


  - 4/9.  CT A/P Increased ascites. Persistent evidence of necrotizing 

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


  - 4/27. status post ROBERT drain placement; C. parapsilosis. s/p drain 5/6 + yeast

& high amylase; s/p additional drain on 5/8. Drains removed. 


  -5/6. fluid  candida parapsilosis fluid, amylase high


  - 6/6 showed multiple pseudocysts, slight larger on the right. s/p drains x 3,

6/7.  + PSAE (MDRO-R Cefepime, Zosyn ANSON < 64) and yeast, 


  -6/7 s/p drain replacement x 3; fluid cult PSAE (MDRO), yeast; treated


  -7/12 CT A/P shows smaller fluid collections.  


  -722 CT abdomen and pelvis drains in place       


Ascites s/p paracentesis 4/15 & 5/6. C. parapsilosis 


Cholelithiasis with thickening of the gallbladder wall.


JED, Hyperkalemia, Metabolic acidosis off dialysis


Acute hypoxic resp failure. trach/vent. sputum 6/13  + PSAE (I merrem) ; sputum 

culture July 19+ for PSAE R Merrem, sensitive to cefepime


Pleural effusion status post CTS left side


 Abdominal fluid culture 6 /7 MDRO Pseudomonas, yeast


Sputum culture positive 7/19 for MDRO Pseudomonas


Chest tube fluid positive for 7/21 Candida Parapsilosis


EC fistula


Severe PCM


Critical illness myopathy


Gen debility


Last urine culture Candida parapsilosis





Plan:


Plan of Care





Continue cefepime and flagyl 8/10


c diff pcr


BC negative so far


Follow-up urine c/s


[place] changed 8/11


Nystatin to groin


Right upper extremity PICC line placed 7/31


Monitor WBC/temp 


Wound care /drain management as directed


Contact isolation for CRE/MDRO








Long term prognosis  poor





D/w nursing











IVAN FRANZ MD           Aug 12, 2020 07:52

## 2020-08-12 NOTE — PDOC
PROGRESS NOTES


Date of Service:


DATE: 8/12/20 


TIME: 11:34





Chief Complaint


Chief Complaint


S/P Exp. 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 

parapsilosis & PSAE 07/26 (I-merrem/ceftazidime/AZT/cefepime))


Leucocytosis 


Fevers, intermittent


Acute gallstone pancreatitis with persistent necrosis


Acute hypoxic Respiratory failure required mechanical ventilation


Tracheostomy 


Bilateral pleural effusions/pulm edema s/p Throacentesis on 6/15/2020


HTN 


Intractable pain


Intractable nausea


Covid 19 negative


Acute on chronic anemia 


Abd distention - U/S and CT reviewed s/p 0.4 L of opaque, debris-containing 

ascites was removed 5/6


Gallstone pancreatitis with necrosis. 


   -CT A/P 6/6 showed multiple pseudocysts, slight larger on the right. s/p 

drains x 3, 6/7.  + PSAE (MDRO-R Cefepime, Zosyn ANSON < 64) and yeast, 


   -s/p drain 4/27. C. parapsilosis. s/p drain 5/6 + yeast & high amylase; s/p 

additional drain on 5/8. Drains removed. 


Ascites s/p paracentesis 4/15 & 5/6. C. parapsilosis 


JED. off HD. 


A large fluid collection in the pancreatic bed has slightly decreased in size, 

described below, the pancreas itself is difficult to  visualize, which could be 

due to necrosis or obscuration of pancreatic  parenchyma from the surrounding 

fluid collection.6/15 


- 4/27 status post ROBERT drain placement + C paropsilosis. s/p additional drains 

5/8


Hypocalcemia 


Prediabetes


s/p trach


Hyperglycemia


Severe protein-caloric malnutrition


Extensive retroperitoneal fluid collections persist. Percutaneous  drains remain

within the collections in both paracolic gutters. These  communicate with 

additional pelvic and peripancreatic collections.





History of Present Illness


History of Present Illness


8/12.   pain in legs,    BP better,  some better Urine outptu





8/11.   dyspnea and mild distress worsened this AM.  but was able to walk some 

with PT later.


still  sometimes,    








08/10/2020


Patient seen and examined


Afebrile


Resting in NAD


Tachycardic and tachypneic overnight


Recurring leukocytosis, 23.0


Chart reviewed


Discussed with RN





08/9: Afebrile.  Calcium down to 10.1.  Little more tachycardic today with some 

more secretions. No O2 requirements. Does not wish to wear her speaking valve. 

Will check CXR.


8/8: Afebrile, weak, having more secretions not wearing a speaking valve today. 

WBC 10, Hb 8.8, , and K3.7, BUN 12, CR 0.5, calcium down to 10.3.  After 

zoledronic acid


8/7: Had a rash after calcitonin injection.  Discussed with nephrology with her 

calcium moving from 11.3-10.9 will give IV bisphosphonate today, zoledronic 

acid.


8/6: Hb stable. Afebrile. Weak. Calcium increased. Shortness of breath is 

improving. Plan for calcitonin today, lasix, and saline


8/5: Hemoglobin abnormally low on repeat has been stable 7.2.  Calcium up to 

10.9.  She is able to work with PT, she is asking to eat.  Social work is been 

having difficulties with both of her daughters completing the financial aspect 

of disability paperwork which is been prolonging her stay over the past 5 

months.  Plan to check PTH and to treat hypercalcemia with 1 L normal saline 40 

mg of IV Lasix and repeat labs in a.m.


8/4: Not wishing to wear her speaking valve. J tube having some difficulties. 

Afebrile. Rolf bedside to aid her. transferred from ICU today


8/3: No overnight events. Calcium 10.7 on AM labs. She is still a bit slow to to

respond, but follows commands well. Does not want speaking valve with me. Pain 

is better controlled in her abdomen. Wound care to see today. Will check liver 

function and phos levels given her calcium elevation.


8/2: Patient seen and examined bedside.  Positive fluid balance in the past 24 

hours.  Patient's chart, labs, images were reviewed and discussed with RN


8/1: No acute events overnight.  Seen and examined at bedside.  Patient had a 

fever 100.2.  Negative fluid balance of 150 cc.  Patient's chart, labs, images 

were reviewed and discussed with RN


7/31: Patient seen and examined at bedside.  No acute events overnight.  

Positive fluid balance 633.  Patient's chart, labs, images were reviewed and 

discussed with RN


7/30: Patient seen and examined bedside.  No acute events overnight.  Patient's 

chart, labs, images were reviewed and discussed with RN


7/28: Patient seen and examined bedside.  Patient appears to be in no obvious 

pain.  All drains have been adequately draining.  Patient continues to be on 

TPN.  Chart labs and imaging was reviewed.  Negative fluid balance of 270 cc


7/27:Patient seen and examined in the ICU.  Patient still requires extensive 

care.  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  Chart, labs, imaging was

reviewed.  UOP with + 500cc balance.


7/25: Patient seen in and examined in the ICU. She is still extremely critically

ill. Appears weak, frail, and pale. Better color today with improved eye contact

and expression. Discussed with RN. Chart reviewed


7/21: Patient seen and examined in the ICU, She is still extremely critically 

ill, Appears extremely weak frail and pale, Discussed with RN, Chart reviewed





Vitals


Vitals





Vital Signs








  Date Time  Temp Pulse Resp B/P (MAP) Pulse Ox O2 Delivery O2 Flow Rate FiO2


 


8/12/20 08:59     98 Room Air  


 


8/12/20 08:28       2.0 


 


8/12/20 07:00 97.6 119 28 103/64 (77)    





 97.6       











Physical Exam


Physical Exam


GENERAL: Resting in bed, NAD


HEENT: Pupils equal, oral cavity dry. OC/Op - Dry


NECK:  Tracheostomy capped


LUNGS: Diminished aeration bases,


HEART:  S1, S2, 


ABDOMEN: Less distended, mild- bowel sounds hypoactive, soft, GJ drain present, 

drainage bag in place with depedent drainage


: [place] in place 


EXTREMITIES: Trace generalized edema, no cyanosis. Yeast in groin area


SKIN: warm touch. No signs of rash.  


NEURO: - Alert and responsive


RUE  PICC site without signs of complications. PIV s clean


EC fistula


General:  Cooperative


Heart:  Normal S1, Normal S2, Other


Lungs:  Clear


Abdomen:  Soft


Extremities:  No clubbing, No cyanosis, Other (Diffuse edema)


Skin:  No breakdown





Labs


LABS





Laboratory Tests








Test


 8/11/20


12:20 8/11/20


13:42 8/11/20


14:30 8/11/20


18:09


 


Glucose (Fingerstick)


 172 mg/dL


(70-99) 


 


 194 mg/dL


(70-99)


 


O2 Saturation  98 % (92-99)   


 


Arterial Blood pH


 


 7.29


(7.35-7.45) 


 





 


Arterial Blood pCO2 at


Patient Temp 


 35 mmHg


(35-46) 


 





 


Arterial Blood pO2 at Patient


Temp 


 105 mmHg


() 


 





 


Arterial Blood HCO3


 


 17 mmol/L


(21-28) 


 





 


Arterial Blood Base Excess


 


 -9 mmol/L


(-3-3) 


 





 


FiO2  4l n.c.   


 


White Blood Count


 


 


 19.3 x10^3/uL


(4.0-11.0) 





 


Red Blood Count


 


 


 2.64 x10^6/uL


(3.50-5.40) 





 


Hemoglobin


 


 


 7.4 g/dL


(12.0-15.5) 





 


Hematocrit


 


 


 23.4 %


(36.0-47.0) 





 


Mean Corpuscular Volume   89 fL ()  


 


Mean Corpuscular Hemoglobin   28 pg (25-35)  


 


Mean Corpuscular Hemoglobin


Concent 


 


 32 g/dL


(31-37) 





 


Red Cell Distribution Width


 


 


 20.0 %


(11.5-14.5) 





 


Platelet Count


 


 


 439 x10^3/uL


(140-400) 





 


Neutrophils (%) (Auto)   93 % (31-73)  


 


Lymphocytes (%) (Auto)   2 % (24-48)  


 


Monocytes (%) (Auto)   4 % (0-9)  


 


Eosinophils (%) (Auto)   0 % (0-3)  


 


Basophils (%) (Auto)   0 % (0-3)  


 


Neutrophils # (Auto)


 


 


 18.0 x10^3/uL


(1.8-7.7) 





 


Lymphocytes # (Auto)


 


 


 0.4 x10^3/uL


(1.0-4.8) 





 


Monocytes # (Auto)


 


 


 0.8 x10^3/uL


(0.0-1.1) 





 


Eosinophils # (Auto)


 


 


 0.0 x10^3/uL


(0.0-0.7) 





 


Basophils # (Auto)


 


 


 0.0 x10^3/uL


(0.0-0.2) 





 


Sodium Level


 


 


 154 mmol/L


(136-145) 





 


Potassium Level


 


 


 5.2 mmol/L


(3.5-5.1) 





 


Chloride Level


 


 


 117 mmol/L


() 





 


Carbon Dioxide Level


 


 


 29 mmol/L


(21-32) 





 


Anion Gap   8 (6-14)  


 


Blood Urea Nitrogen


 


 


 69 mg/dL


(7-20) 





 


Creatinine


 


 


 2.2 mg/dL


(0.6-1.0) 





 


Estimated GFR


(Cockcroft-Gault) 


 


 23.7 


 





 


BUN/Creatinine Ratio   31 (6-20)  


 


Glucose Level


 


 


 219 mg/dL


(70-99) 





 


Lactic Acid Level


 


 


 3.3 mmol/L


(0.4-2.0) 





 


Calcium Level


 


 


 7.4 mg/dL


(8.5-10.1) 





 


Total Bilirubin


 


 


 0.3 mg/dL


(0.2-1.0) 





 


Aspartate Amino Transf


(AST/SGOT) 


 


 15 U/L (15-37) 


 





 


Alanine Aminotransferase


(ALT/SGPT) 


 


 10 U/L (14-59) 


 





 


Alkaline Phosphatase


 


 


 76 U/L


() 





 


Total Protein


 


 


 4.9 g/dL


(6.4-8.2) 





 


Albumin


 


 


 1.3 g/dL


(3.4-5.0) 





 


Albumin/Globulin Ratio   0.4 (1.0-1.7)  


 


Test


 8/11/20


21:30 8/12/20


00:38 8/12/20


05:15 8/12/20


07:19


 


Lactic Acid Level


 2.8 mmol/L


(0.4-2.0) 


 


 





 


Glucose (Fingerstick)


 


 167 mg/dL


(70-99) 


 166 mg/dL


(70-99)


 


White Blood Count


 


 


 13.8 x10^3/uL


(4.0-11.0) 





 


Red Blood Count


 


 


 2.64 x10^6/uL


(3.50-5.40) 





 


Hemoglobin


 


 


 7.3 g/dL


(12.0-15.5) 





 


Hematocrit


 


 


 23.7 %


(36.0-47.0) 





 


Mean Corpuscular Volume   90 fL ()  


 


Mean Corpuscular Hemoglobin   28 pg (25-35)  


 


Mean Corpuscular Hemoglobin


Concent 


 


 31 g/dL


(31-37) 





 


Red Cell Distribution Width


 


 


 19.6 %


(11.5-14.5) 





 


Platelet Count


 


 


 397 x10^3/uL


(140-400) 





 


Neutrophils (%) (Auto)   87 % (31-73)  


 


Lymphocytes (%) (Auto)   7 % (24-48)  


 


Monocytes (%) (Auto)   5 % (0-9)  


 


Eosinophils (%) (Auto)   1 % (0-3)  


 


Basophils (%) (Auto)   0 % (0-3)  


 


Neutrophils # (Auto)


 


 


 12.0 x10^3/uL


(1.8-7.7) 





 


Lymphocytes # (Auto)


 


 


 0.9 x10^3/uL


(1.0-4.8) 





 


Monocytes # (Auto)


 


 


 0.7 x10^3/uL


(0.0-1.1) 





 


Eosinophils # (Auto)


 


 


 0.2 x10^3/uL


(0.0-0.7) 





 


Basophils # (Auto)


 


 


 0.0 x10^3/uL


(0.0-0.2) 





 


Sodium Level


 


 


 152 mmol/L


(136-145) 





 


Potassium Level


 


 


 4.6 mmol/L


(3.5-5.1) 





 


Chloride Level


 


 


 117 mmol/L


() 





 


Carbon Dioxide Level


 


 


 26 mmol/L


(21-32) 





 


Anion Gap   9 (6-14)  


 


Blood Urea Nitrogen


 


 


 76 mg/dL


(7-20) 





 


Creatinine


 


 


 2.1 mg/dL


(0.6-1.0) 





 


Estimated GFR


(Cockcroft-Gault) 


 


 25.0 


 





 


BUN/Creatinine Ratio   36 (6-20)  


 


Glucose Level


 


 


 171 mg/dL


(70-99) 





 


Calcium Level


 


 


 7.4 mg/dL


(8.5-10.1) 





 


Total Bilirubin


 


 


 0.2 mg/dL


(0.2-1.0) 





 


Aspartate Amino Transf


(AST/SGOT) 


 


 20 U/L (15-37) 


 





 


Alanine Aminotransferase


(ALT/SGPT) 


 


 8 U/L (14-59) 


 





 


Alkaline Phosphatase


 


 


 86 U/L


() 





 


Total Protein


 


 


 5.0 g/dL


(6.4-8.2) 





 


Albumin


 


 


 1.3 g/dL


(3.4-5.0) 





 


Albumin/Globulin Ratio   0.4 (1.0-1.7)  











Assessment and Plan


Assessmemt and Plan


Problems


Medical Problems:


(1) Acute pancreatitis


Status: Acute  





(2) Cholelithiasis


Status: Acute  











Comment


Review of Relevant


I have reviewed the following items josy (where applicable) has been applied.


Labs





Laboratory Tests








Test


 8/10/20


12:27 8/10/20


18:38 8/11/20


00:25 8/11/20


05:30


 


Glucose (Fingerstick)


 171 mg/dL


(70-99) 210 mg/dL


(70-99) 185 mg/dL


(70-99) 





 


Sodium Level


 


 


 


 147 mmol/L


(136-145)


 


Potassium Level


 


 


 


 5.9 mmol/L


(3.5-5.1)


 


Chloride Level


 


 


 


 113 mmol/L


()


 


Carbon Dioxide Level


 


 


 


 25 mmol/L


(21-32)


 


Anion Gap    9 (6-14) 


 


Blood Urea Nitrogen


 


 


 


 61 mg/dL


(7-20)


 


Creatinine


 


 


 


 1.8 mg/dL


(0.6-1.0)


 


Estimated GFR


(Cockcroft-Gault) 


 


 


 29.9 





 


Glucose Level


 


 


 


 209 mg/dL


(70-99)


 


Calcium Level


 


 


 


 8.2 mg/dL


(8.5-10.1)


 


Test


 8/11/20


05:34 8/11/20


12:20 8/11/20


13:42 8/11/20


14:30


 


Glucose (Fingerstick)


 190 mg/dL


(70-99) 172 mg/dL


(70-99) 


 





 


O2 Saturation   98 % (92-99)  


 


Arterial Blood pH


 


 


 7.29


(7.35-7.45) 





 


Arterial Blood pCO2 at


Patient Temp 


 


 35 mmHg


(35-46) 





 


Arterial Blood pO2 at Patient


Temp 


 


 105 mmHg


() 





 


Arterial Blood HCO3


 


 


 17 mmol/L


(21-28) 





 


Arterial Blood Base Excess


 


 


 -9 mmol/L


(-3-3) 





 


FiO2   4l n.c.  


 


White Blood Count


 


 


 


 19.3 x10^3/uL


(4.0-11.0)


 


Red Blood Count


 


 


 


 2.64 x10^6/uL


(3.50-5.40)


 


Hemoglobin


 


 


 


 7.4 g/dL


(12.0-15.5)


 


Hematocrit


 


 


 


 23.4 %


(36.0-47.0)


 


Mean Corpuscular Volume    89 fL () 


 


Mean Corpuscular Hemoglobin    28 pg (25-35) 


 


Mean Corpuscular Hemoglobin


Concent 


 


 


 32 g/dL


(31-37)


 


Red Cell Distribution Width


 


 


 


 20.0 %


(11.5-14.5)


 


Platelet Count


 


 


 


 439 x10^3/uL


(140-400)


 


Neutrophils (%) (Auto)    93 % (31-73) 


 


Lymphocytes (%) (Auto)    2 % (24-48) 


 


Monocytes (%) (Auto)    4 % (0-9) 


 


Eosinophils (%) (Auto)    0 % (0-3) 


 


Basophils (%) (Auto)    0 % (0-3) 


 


Neutrophils # (Auto)


 


 


 


 18.0 x10^3/uL


(1.8-7.7)


 


Lymphocytes # (Auto)


 


 


 


 0.4 x10^3/uL


(1.0-4.8)


 


Monocytes # (Auto)


 


 


 


 0.8 x10^3/uL


(0.0-1.1)


 


Eosinophils # (Auto)


 


 


 


 0.0 x10^3/uL


(0.0-0.7)


 


Basophils # (Auto)


 


 


 


 0.0 x10^3/uL


(0.0-0.2)


 


Sodium Level


 


 


 


 154 mmol/L


(136-145)


 


Potassium Level


 


 


 


 5.2 mmol/L


(3.5-5.1)


 


Chloride Level


 


 


 


 117 mmol/L


()


 


Carbon Dioxide Level


 


 


 


 29 mmol/L


(21-32)


 


Anion Gap    8 (6-14) 


 


Blood Urea Nitrogen


 


 


 


 69 mg/dL


(7-20)


 


Creatinine


 


 


 


 2.2 mg/dL


(0.6-1.0)


 


Estimated GFR


(Cockcroft-Gault) 


 


 


 23.7 





 


BUN/Creatinine Ratio    31 (6-20) 


 


Glucose Level


 


 


 


 219 mg/dL


(70-99)


 


Lactic Acid Level


 


 


 


 3.3 mmol/L


(0.4-2.0)


 


Calcium Level


 


 


 


 7.4 mg/dL


(8.5-10.1)


 


Total Bilirubin


 


 


 


 0.3 mg/dL


(0.2-1.0)


 


Aspartate Amino Transf


(AST/SGOT) 


 


 


 15 U/L (15-37) 





 


Alanine Aminotransferase


(ALT/SGPT) 


 


 


 10 U/L (14-59) 





 


Alkaline Phosphatase


 


 


 


 76 U/L


()


 


Total Protein


 


 


 


 4.9 g/dL


(6.4-8.2)


 


Albumin


 


 


 


 1.3 g/dL


(3.4-5.0)


 


Albumin/Globulin Ratio    0.4 (1.0-1.7) 


 


Test


 8/11/20


18:09 8/11/20


21:30 8/12/20


00:38 8/12/20


05:15


 


Glucose (Fingerstick)


 194 mg/dL


(70-99) 


 167 mg/dL


(70-99) 





 


Lactic Acid Level


 


 2.8 mmol/L


(0.4-2.0) 


 





 


White Blood Count


 


 


 


 13.8 x10^3/uL


(4.0-11.0)


 


Red Blood Count


 


 


 


 2.64 x10^6/uL


(3.50-5.40)


 


Hemoglobin


 


 


 


 7.3 g/dL


(12.0-15.5)


 


Hematocrit


 


 


 


 23.7 %


(36.0-47.0)


 


Mean Corpuscular Volume    90 fL () 


 


Mean Corpuscular Hemoglobin    28 pg (25-35) 


 


Mean Corpuscular Hemoglobin


Concent 


 


 


 31 g/dL


(31-37)


 


Red Cell Distribution Width


 


 


 


 19.6 %


(11.5-14.5)


 


Platelet Count


 


 


 


 397 x10^3/uL


(140-400)


 


Neutrophils (%) (Auto)    87 % (31-73) 


 


Lymphocytes (%) (Auto)    7 % (24-48) 


 


Monocytes (%) (Auto)    5 % (0-9) 


 


Eosinophils (%) (Auto)    1 % (0-3) 


 


Basophils (%) (Auto)    0 % (0-3) 


 


Neutrophils # (Auto)


 


 


 


 12.0 x10^3/uL


(1.8-7.7)


 


Lymphocytes # (Auto)


 


 


 


 0.9 x10^3/uL


(1.0-4.8)


 


Monocytes # (Auto)


 


 


 


 0.7 x10^3/uL


(0.0-1.1)


 


Eosinophils # (Auto)


 


 


 


 0.2 x10^3/uL


(0.0-0.7)


 


Basophils # (Auto)


 


 


 


 0.0 x10^3/uL


(0.0-0.2)


 


Sodium Level


 


 


 


 152 mmol/L


(136-145)


 


Potassium Level


 


 


 


 4.6 mmol/L


(3.5-5.1)


 


Chloride Level


 


 


 


 117 mmol/L


()


 


Carbon Dioxide Level


 


 


 


 26 mmol/L


(21-32)


 


Anion Gap    9 (6-14) 


 


Blood Urea Nitrogen


 


 


 


 76 mg/dL


(7-20)


 


Creatinine


 


 


 


 2.1 mg/dL


(0.6-1.0)


 


Estimated GFR


(Cockcroft-Gault) 


 


 


 25.0 





 


BUN/Creatinine Ratio    36 (6-20) 


 


Glucose Level


 


 


 


 171 mg/dL


(70-99)


 


Calcium Level


 


 


 


 7.4 mg/dL


(8.5-10.1)


 


Total Bilirubin


 


 


 


 0.2 mg/dL


(0.2-1.0)


 


Aspartate Amino Transf


(AST/SGOT) 


 


 


 20 U/L (15-37) 





 


Alanine Aminotransferase


(ALT/SGPT) 


 


 


 8 U/L (14-59) 





 


Alkaline Phosphatase


 


 


 


 86 U/L


()


 


Total Protein


 


 


 


 5.0 g/dL


(6.4-8.2)


 


Albumin


 


 


 


 1.3 g/dL


(3.4-5.0)


 


Albumin/Globulin Ratio    0.4 (1.0-1.7) 


 


Test


 8/12/20


07:19 


 


 





 


Glucose (Fingerstick)


 166 mg/dL


(70-99) 


 


 











Laboratory Tests








Test


 8/11/20


12:20 8/11/20


13:42 8/11/20


14:30 8/11/20


18:09


 


Glucose (Fingerstick)


 172 mg/dL


(70-99) 


 


 194 mg/dL


(70-99)


 


O2 Saturation  98 % (92-99)   


 


Arterial Blood pH


 


 7.29


(7.35-7.45) 


 





 


Arterial Blood pCO2 at


Patient Temp 


 35 mmHg


(35-46) 


 





 


Arterial Blood pO2 at Patient


Temp 


 105 mmHg


() 


 





 


Arterial Blood HCO3


 


 17 mmol/L


(21-28) 


 





 


Arterial Blood Base Excess


 


 -9 mmol/L


(-3-3) 


 





 


FiO2  4l n.c.   


 


White Blood Count


 


 


 19.3 x10^3/uL


(4.0-11.0) 





 


Red Blood Count


 


 


 2.64 x10^6/uL


(3.50-5.40) 





 


Hemoglobin


 


 


 7.4 g/dL


(12.0-15.5) 





 


Hematocrit


 


 


 23.4 %


(36.0-47.0) 





 


Mean Corpuscular Volume   89 fL ()  


 


Mean Corpuscular Hemoglobin   28 pg (25-35)  


 


Mean Corpuscular Hemoglobin


Concent 


 


 32 g/dL


(31-37) 





 


Red Cell Distribution Width


 


 


 20.0 %


(11.5-14.5) 





 


Platelet Count


 


 


 439 x10^3/uL


(140-400) 





 


Neutrophils (%) (Auto)   93 % (31-73)  


 


Lymphocytes (%) (Auto)   2 % (24-48)  


 


Monocytes (%) (Auto)   4 % (0-9)  


 


Eosinophils (%) (Auto)   0 % (0-3)  


 


Basophils (%) (Auto)   0 % (0-3)  


 


Neutrophils # (Auto)


 


 


 18.0 x10^3/uL


(1.8-7.7) 





 


Lymphocytes # (Auto)


 


 


 0.4 x10^3/uL


(1.0-4.8) 





 


Monocytes # (Auto)


 


 


 0.8 x10^3/uL


(0.0-1.1) 





 


Eosinophils # (Auto)


 


 


 0.0 x10^3/uL


(0.0-0.7) 





 


Basophils # (Auto)


 


 


 0.0 x10^3/uL


(0.0-0.2) 





 


Sodium Level


 


 


 154 mmol/L


(136-145) 





 


Potassium Level


 


 


 5.2 mmol/L


(3.5-5.1) 





 


Chloride Level


 


 


 117 mmol/L


() 





 


Carbon Dioxide Level


 


 


 29 mmol/L


(21-32) 





 


Anion Gap   8 (6-14)  


 


Blood Urea Nitrogen


 


 


 69 mg/dL


(7-20) 





 


Creatinine


 


 


 2.2 mg/dL


(0.6-1.0) 





 


Estimated GFR


(Cockcroft-Gault) 


 


 23.7 


 





 


BUN/Creatinine Ratio   31 (6-20)  


 


Glucose Level


 


 


 219 mg/dL


(70-99) 





 


Lactic Acid Level


 


 


 3.3 mmol/L


(0.4-2.0) 





 


Calcium Level


 


 


 7.4 mg/dL


(8.5-10.1) 





 


Total Bilirubin


 


 


 0.3 mg/dL


(0.2-1.0) 





 


Aspartate Amino Transf


(AST/SGOT) 


 


 15 U/L (15-37) 


 





 


Alanine Aminotransferase


(ALT/SGPT) 


 


 10 U/L (14-59) 


 





 


Alkaline Phosphatase


 


 


 76 U/L


() 





 


Total Protein


 


 


 4.9 g/dL


(6.4-8.2) 





 


Albumin


 


 


 1.3 g/dL


(3.4-5.0) 





 


Albumin/Globulin Ratio   0.4 (1.0-1.7)  


 


Test


 8/11/20


21:30 8/12/20


00:38 8/12/20


05:15 8/12/20


07:19


 


Lactic Acid Level


 2.8 mmol/L


(0.4-2.0) 


 


 





 


Glucose (Fingerstick)


 


 167 mg/dL


(70-99) 


 166 mg/dL


(70-99)


 


White Blood Count


 


 


 13.8 x10^3/uL


(4.0-11.0) 





 


Red Blood Count


 


 


 2.64 x10^6/uL


(3.50-5.40) 





 


Hemoglobin


 


 


 7.3 g/dL


(12.0-15.5) 





 


Hematocrit


 


 


 23.7 %


(36.0-47.0) 





 


Mean Corpuscular Volume   90 fL ()  


 


Mean Corpuscular Hemoglobin   28 pg (25-35)  


 


Mean Corpuscular Hemoglobin


Concent 


 


 31 g/dL


(31-37) 





 


Red Cell Distribution Width


 


 


 19.6 %


(11.5-14.5) 





 


Platelet Count


 


 


 397 x10^3/uL


(140-400) 





 


Neutrophils (%) (Auto)   87 % (31-73)  


 


Lymphocytes (%) (Auto)   7 % (24-48)  


 


Monocytes (%) (Auto)   5 % (0-9)  


 


Eosinophils (%) (Auto)   1 % (0-3)  


 


Basophils (%) (Auto)   0 % (0-3)  


 


Neutrophils # (Auto)


 


 


 12.0 x10^3/uL


(1.8-7.7) 





 


Lymphocytes # (Auto)


 


 


 0.9 x10^3/uL


(1.0-4.8) 





 


Monocytes # (Auto)


 


 


 0.7 x10^3/uL


(0.0-1.1) 





 


Eosinophils # (Auto)


 


 


 0.2 x10^3/uL


(0.0-0.7) 





 


Basophils # (Auto)


 


 


 0.0 x10^3/uL


(0.0-0.2) 





 


Sodium Level


 


 


 152 mmol/L


(136-145) 





 


Potassium Level


 


 


 4.6 mmol/L


(3.5-5.1) 





 


Chloride Level


 


 


 117 mmol/L


() 





 


Carbon Dioxide Level


 


 


 26 mmol/L


(21-32) 





 


Anion Gap   9 (6-14)  


 


Blood Urea Nitrogen


 


 


 76 mg/dL


(7-20) 





 


Creatinine


 


 


 2.1 mg/dL


(0.6-1.0) 





 


Estimated GFR


(Cockcroft-Gault) 


 


 25.0 


 





 


BUN/Creatinine Ratio   36 (6-20)  


 


Glucose Level


 


 


 171 mg/dL


(70-99) 





 


Calcium Level


 


 


 7.4 mg/dL


(8.5-10.1) 





 


Total Bilirubin


 


 


 0.2 mg/dL


(0.2-1.0) 





 


Aspartate Amino Transf


(AST/SGOT) 


 


 20 U/L (15-37) 


 





 


Alanine Aminotransferase


(ALT/SGPT) 


 


 8 U/L (14-59) 


 





 


Alkaline Phosphatase


 


 


 86 U/L


() 





 


Total Protein


 


 


 5.0 g/dL


(6.4-8.2) 





 


Albumin


 


 


 1.3 g/dL


(3.4-5.0) 





 


Albumin/Globulin Ratio   0.4 (1.0-1.7)  








Microbiology


8/10/20 Blood Culture - Preliminary, Resulted


          NO GROWTH AFTER 2 DAYS


7/26/20 Gram Stain - Final, Complete


          


7/26/20 Aerobic Culture - Final, Complete


          


7/26/20 Urine Culture - Final, Complete


          


7/21/20 Gram Stain - Final, Complete


          


7/21/20 Aerobic and Anaerobic Culture - Final, Complete


          


7/19/20 Gram Stain Evaluation - Final, Complete


          


7/19/20 Respiratory Culture - Final, Complete


          


7/19/20 Antimicrobic Susceptibility - Final, Complete


          


6/30/20 Gram Stain - Final, Complete


          


6/30/20 Aerobic and Anaerobic Culture - Final, Complete


          


6/30/20 Antimicrobic Susceptibility - Final, Complete


Medications





Current Medications


Sodium Chloride 1,000 ml @  1,000 mls/hr Q1H IV  Last administered on 3/16/20at 

03:00;  Start 3/16/20 at 03:00;  Stop 3/16/20 at 03:59;  Status DC


Ondansetron HCl (Zofran) 4 mg 1X  ONCE IVP  Last administered on 3/16/20at 

03:27;  Start 3/16/20 at 03:00;  Stop 3/16/20 at 03:01;  Status DC


Morphine Sulfate (Morphine Sulfate) 4 mg 1X  ONCE IV ;  Start 3/16/20 at 03:00; 

Stop 3/16/20 at 03:01;  Status Cancel


Ketorolac Tromethamine (Toradol 30mg Vial) 30 mg 1X  ONCE IV  Last administered 

on 3/16/20at 02:54;  Start 3/16/20 at 03:00;  Stop 3/16/20 at 03:01;  Status DC


Fentanyl Citrate (Fentanyl 2ml Vial) 25 mcg 1X  ONCE IVP  Last administered on 

3/16/20at 03:23;  Start 3/16/20 at 03:30;  Stop 3/16/20 at 03:31;  Status DC


Fentanyl Citrate (Fentanyl 2ml Vial) 100 mcg STK-MED ONCE .ROUTE ;  Start 

3/16/20 at 03:18;  Stop 3/16/20 at 03:18;  Status DC


Iohexol (Omnipaque 350 Mg/ml) 90 ml 1X  ONCE IV  Last administered on 3/16/20at 

03:25;  Start 3/16/20 at 03:30;  Stop 3/16/20 at 03:31;  Status DC


Info (CONTRAST GIVEN -- Rx MONITORING) 1 each PRN DAILY  PRN MC SEE COMMENTS;  

Start 3/16/20 at 03:30;  Stop 3/18/20 at 03:29;  Status DC


Hydromorphone HCl (Dilaudid) 0.5 mg 1X  ONCE IV  Last administered on 3/16/20at 

03:55;  Start 3/16/20 at 04:30;  Stop 3/16/20 at 04:32;  Status DC


Ondansetron HCl (Zofran) 4 mg PRN Q8HRS  PRN IV NAUSEA/VOMITING 1ST CHOICE;  

Start 3/16/20 at 05:00;  Stop 3/16/20 at 09:27;  Status DC


Morphine Sulfate (Morphine Sulfate) 2 mg PRN Q2HR  PRN IV SEVERE PAIN 7-10 Last 

administered on 3/17/20at 12:26;  Start 3/16/20 at 05:00;  Stop 3/17/20 at 

14:15;  Status DC


Sodium Chloride 1,000 ml @  125 mls/hr Q8H IV  Last administered on 3/16/20at 

20:56;  Start 3/16/20 at 05:00;  Stop 3/17/20 at 04:59;  Status DC


Hydromorphone HCl (Dilaudid) 0.5 mg PRN Q3HRS  PRN IV SEVERE PAIN 7-10 Last 

administered on 3/17/20at 10:06;  Start 3/16/20 at 05:00;  Stop 3/17/20 at 

12:01;  Status DC


Piperacillin Sod/ Tazobactam Sod 4.5 gm/Sodium Chloride 100 ml @  200 mls/hr 1X 

ONCE IV  Last administered on 3/16/20at 05:44;  Start 3/16/20 at 06:00;  Stop 

3/16/20 at 06:29;  Status DC


Ondansetron HCl (Zofran) 4 mg PRN Q4HRS  PRN IV NAUSEA/VOMITING 1ST CHOICE Last 

administered on 8/9/20at 23:15;  Start 3/16/20 at 09:30


Insulin Human Lispro (HumaLOG) 0-9 UNITS Q6HRS SQ  Last administered on 

8/10/20at 19:01;  Start 3/16/20 at 09:30


Dextrose (Dextrose 50%-Water Syringe) 12.5 gm PRN Q15MIN  PRN IV SEE COMMENTS;  

Start 3/16/20 at 09:30


Pantoprazole Sodium (PROTONIX VIAL for IV PUSH) 40 mg DAILYAC IVP  Last 

administered on 8/12/20at 08:28;  Start 3/16/20 at 11:30


Prochlorperazine Edisylate (Compazine) 10 mg PRN Q6HRS  PRN IV NAUSEA/VOMITING, 

2nd CHOICE Last administered on 8/10/20at 00:42;  Start 3/16/20 at 17:45


Atenolol (Tenormin) 100 mg DAILY PO ;  Start 3/17/20 at 09:00;  Stop 3/16/20 at 

20:08;  Status DC


Metoprolol Tartrate (Lopressor Vial) 2.5 mg Q6HRS IVP  Last administered on 

3/17/20at 05:51;  Start 3/16/20 at 20:15;  Stop 3/17/20 at 10:02;  Status DC


Metoprolol Tartrate (Lopressor Vial) 5 mg Q6HRS IVP  Last administered on 

3/26/20at 00:12;  Start 3/17/20 at 10:15;  Stop 3/28/20 at 08:48;  Status DC


Hydromorphone HCl (Dilaudid) 1 mg PRN Q3HRS  PRN IV SEVERE PAIN 7-10 Last 

administered on 3/23/20at 05:13;  Start 3/17/20 at 12:00;  Stop 3/31/20 at 

00:25;  Status DC


Lidocaine HCl (Buffered Lidocaine 1%) 3 ml STK-MED ONCE .ROUTE ;  Start 3/17/20 

at 12:55;  Stop 3/17/20 at 12:56;  Status DC


Albumin Human 500 ml @  125 mls/hr 1X  ONCE IV  Last administered on 3/17/20at 

14:33;  Start 3/17/20 at 14:30;  Stop 3/17/20 at 18:32;  Status DC


Norepinephrine Bitartrate 8 mg/ Dextrose 258 ml @  17.299 mls/ hr CONT  PRN IV 

PER PROTOCOL Last administered on 4/14/20at 12:48;  Start 3/17/20 at 15:30;  

Stop 4/17/20 at 09:19;  Status DC


Sodium Chloride 1,000 ml @  125 mls/hr Q8H IV  Last administered on 3/17/20at 

21:04;  Start 3/17/20 at 16:00;  Stop 3/18/20 at 02:42;  Status DC


Albumin Human 500 ml @  125 mls/hr PRN BID  PRN IV After every 2L NSS & BP < 

90mm Last administered on 6/30/20at 16:06;  Start 3/17/20 at 16:00;  Stop 7/3/20

at 09:30;  Status DC


Iohexol (Omnipaque 300 Mg/ml) 60 ml 1X  ONCE IV  Last administered on 3/17/20at 

17:20;  Start 3/17/20 at 17:00;  Stop 3/17/20 at 17:01;  Status DC


Info (CONTRAST GIVEN -- Rx MONITORING) 1 each PRN DAILY  PRN MC SEE COMMENTS;  

Start 3/17/20 at 17:00;  Stop 3/19/20 at 16:59;  Status DC


Meropenem 1 gm/ Sodium Chloride 100 ml @  200 mls/hr Q8HRS IV  Last administered

on 3/18/20at 05:45;  Start 3/17/20 at 20:00;  Stop 3/18/20 at 08:48;  Status DC


Furosemide (Lasix) 40 mg 1X  ONCE IVP  Last administered on 3/17/20at 22:12;  

Start 3/17/20 at 22:30;  Stop 3/17/20 at 22:31;  Status DC


Calcium Chloride 1000 mg/Sodium Chloride 110 ml @  220 mls/hr 1X  ONCE IV  Last 

administered on 3/17/20at 22:11;  Start 3/17/20 at 22:30;  Stop 3/17/20 at 

22:59;  Status DC


Albuterol Sulfate (Ventolin Neb Soln) 2.5 mg 1X  ONCE NEB  Last administered on 

3/18/20at 00:56;  Start 3/17/20 at 22:30;  Stop 3/17/20 at 22:31;  Status DC


Insulin Human Regular (HumuLIN R VIAL) 5 unit 1X  ONCE IV  Last administered on 

3/17/20at 22:14;  Start 3/17/20 at 22:30;  Stop 3/17/20 at 22:31;  Status DC


Magnesium Sulfate 50 ml @ 25 mls/hr 1X  ONCE IV  Last administered on 3/18/20at 

02:57;  Start 3/18/20 at 03:00;  Stop 3/18/20 at 04:59;  Status DC


Calcium Gluconate 1000 mg/Sodium Chloride 110 ml @  220 mls/hr 1X  ONCE IV  Last

administered on 3/18/20at 02:46;  Start 3/18/20 at 03:00;  Stop 3/18/20 at 

03:29;  Status DC


Sodium Chloride 1,000 ml @  200 mls/hr Q5H IV  Last administered on 3/18/20at 

02:46;  Start 3/18/20 at 03:00;  Stop 3/18/20 at 10:21;  Status DC


Calcium Gluconate 1000 mg/Sodium Chloride 110 ml @  220 mls/hr 1X  ONCE IV  Last

administered on 3/18/20at 03:21;  Start 3/18/20 at 03:30;  Stop 3/18/20 at 

03:59;  Status DC


Sodium Bicarbonate 50 meq/Sodium Chloride 1,050 ml @  75 mls/hr Q14H IV  Last 

administered on 3/22/20at 21:10;  Start 3/18/20 at 07:30;  Stop 3/23/20 at 

10:28;  Status DC


Calcium Gluconate 2000 mg/Sodium Chloride 120 ml @  220 mls/hr 1X  ONCE IV  Last

administered on 3/18/20at 09:05;  Start 3/18/20 at 07:30;  Stop 3/18/20 at 

08:02;  Status DC


Lidocaine HCl (Xylocaine-Mpf 1% 2ml Vial) 2 ml STK-MED ONCE .ROUTE ;  Start 

3/18/20 at 08:47;  Stop 3/18/20 at 08:47;  Status DC


Meropenem 500 mg/ Sodium Chloride 50 ml @  100 mls/hr Q12HR IV  Last 

administered on 3/23/20at 21:01;  Start 3/18/20 at 18:00;  Stop 3/24/20 at 

07:58;  Status DC


Lidocaine HCl (Buffered Lidocaine 1%) 3 ml STK-MED ONCE .ROUTE ;  Start 3/18/20 

at 09:46;  Stop 3/18/20 at 09:46;  Status DC


Lidocaine HCl (Buffered Lidocaine 1%) 6 ml 1X  ONCE INJ  Last administered on 

3/18/20at 10:26;  Start 3/18/20 at 10:15;  Stop 3/18/20 at 10:16;  Status DC


Info (Tpn Per Pharmacy) 1 each PRN DAILY  PRN MC SEE COMMENTS Last administered 

on 7/26/20at 08:31;  Start 3/18/20 at 12:00;  Stop 7/26/20 at 10:41;  Status DC


Sodium Chloride 1,000 ml @  1,000 mls/hr Q1H PRN IV hypotension;  Start 3/18/20 

at 12:07;  Stop 3/18/20 at 18:06;  Status DC


Diphenhydramine HCl (Benadryl) 25 mg 1X PRN  PRN IV ITCHING;  Start 3/18/20 at 

12:15;  Stop 3/19/20 at 12:14;  Status DC


Diphenhydramine HCl (Benadryl) 25 mg 1X PRN  PRN IV ITCHING;  Start 3/18/20 at 

12:15;  Stop 3/19/20 at 12:14;  Status DC


Sodium Chloride 1,000 ml @  400 mls/hr Q2H30M PRN IV PATENCY;  Start 3/18/20 at 

12:07;  Stop 3/19/20 at 00:06;  Status DC


Info (PHARMACY MONITORING -- do not chart) 1 each PRN DAILY  PRN MC SEE 

COMMENTS;  Start 3/18/20 at 12:15;  Stop 3/20/20 at 08:13;  Status DC


Sodium Chloride 90 meq/Calcium Gluconate 10 meq/ Multivitamins 10 ml/Chromium/ 

Copper/Manganese/ Seleni/Zn 1 ml/ Total Parenteral Nutrition/Amino 

Acids/Dextrose/ Fat Emulsion Intravenous 55.005 ml  @ 2.292 mls/hr TPN  CONT IV 

;  Start 3/18/20 at 22:00;  Stop 3/18/20 at 12:33;  Status DC


Info (Tpn Per Pharmacy) 1 each PRN DAILY  PRN MC SEE COMMENTS;  Start 3/18/20 at

12:30;  Status UNV


Sodium Chloride 90 meq/Calcium Gluconate 10 meq/ Multivitamins 10 ml/Chromium/ 

Copper/Manganese/ Seleni/Zn 0.5 ml/ Total Parenteral Nutrition/Amino 

Acids/Dextrose/ Fat Emulsion Intravenous 1,512 ml @  63 mls/hr TPN  CONT IV  Las

t administered on 3/18/20at 22:06;  Start 3/18/20 at 22:00;  Stop 3/19/20 at 

21:59;  Status DC


Calcium Carbonate/ Glycine (Tums) 500 mg PRN AFTMEALHC  PRN PO INDIGESTION;  

Start 3/18/20 at 17:45;  Stop 5/13/20 at 10:25;  Status DC


Calcium Gluconate (Calcium Gluconate) 2,000 mg 1X  ONCE IVP  Last administered 

on 3/19/20at 02:19;  Start 3/19/20 at 02:15;  Stop 3/19/20 at 02:16;  Status DC


Calcium Chloride 3000 mg/Sodium Chloride 1,030 ml @  50 mls/hr [id number] IV  Last 

administered on 3/21/20at 02:17;  Start 3/19/20 at 08:00;  Stop 3/21/20 at 

15:23;  Status DC


Lorazepam (Ativan Inj) 1 mg PRN Q4HRS  PRN IVP ANXIETY / AGITATION, 2nd choic 

Last administered on 4/17/20at 03:51;  Start 3/19/20 at 09:00;  Stop 4/17/20 at 

09:19;  Status DC


Sodium Chloride 1,000 ml @  1,000 mls/hr Q1H PRN IV hypotension;  Start 3/19/20 

at 08:56;  Stop 3/19/20 at 14:55;  Status DC


Albumin Human 200 ml @  200 mls/hr 1X PRN  PRN IV Hypotension;  Start 3/19/20 at

09:00;  Stop 3/19/20 at 14:59;  Status DC


Diphenhydramine HCl (Benadryl) 25 mg 1X PRN  PRN IV ITCHING;  Start 3/19/20 at 

09:00;  Stop 3/20/20 at 08:59;  Status DC


Diphenhydramine HCl (Benadryl) 25 mg 1X PRN  PRN IV ITCHING;  Start 3/19/20 at 

09:00;  Stop 3/20/20 at 08:59;  Status DC


Sodium Chloride 1,000 ml @  400 mls/hr Q2H30M PRN IV PATENCY;  Start 3/19/20 at 

08:56;  Stop 3/19/20 at 20:55;  Status DC


Info (PHARMACY MONITORING -- do not chart) 1 each PRN DAILY  PRN MC SEE 

COMMENTS;  Start 3/19/20 at 09:00;  Status UNV


Info (PHARMACY MONITORING -- do not chart) 1 each PRN DAILY  PRN MC SEE 

COMMENTS;  Start 3/19/20 at 09:00;  Stop 3/20/20 at 08:13;  Status DC


Digoxin (Lanoxin) 500 mcg 1X  ONCE IV  Last administered on 3/19/20at 10:04;  

Start 3/19/20 at 10:00;  Stop 3/19/20 at 10:01;  Status DC


Digoxin (Lanoxin) 125 mcg 1X  ONCE IV  Last administered on 3/19/20at 17:10;  

Start 3/19/20 at 18:00;  Stop 3/19/20 at 18:01;  Status DC


Magnesium Sulfate 100 ml @  25 mls/hr 1X  ONCE IV  Last administered on 

3/19/20at 12:48;  Start 3/19/20 at 13:00;  Stop 3/19/20 at 16:59;  Status DC


Sodium Chloride 90 meq/Magnesium Sulfate 10 meq/ Calcium Gluconate 20 meq/ 

Multivitamins 10 ml/Chromium/ Copper/Manganese/ Seleni/Zn 0.5 ml/ Total 

Parenteral Nutrition/Amino Acids/Dextrose/ Fat Emulsion Intravenous 1,512 ml @  

63 mls/hr TPN  CONT IV  Last administered on 3/19/20at 22:25;  Start 3/19/20 at 

22:00;  Stop 3/20/20 at 21:59;  Status DC


Sodium Chloride 1,000 ml @  1,000 mls/hr Q1H PRN IV hypotension;  Start 3/20/20 

at 08:05;  Stop 3/20/20 at 14:04;  Status DC


Albumin Human 200 ml @  200 mls/hr 1X  ONCE IV  Last administered on 3/20/20at 

08:57;  Start 3/20/20 at 08:15;  Stop 3/20/20 at 09:14;  Status DC


Diphenhydramine HCl (Benadryl) 25 mg 1X PRN  PRN IV ITCHING;  Start 3/20/20 at 

08:15;  Stop 3/21/20 at 08:14;  Status DC


Diphenhydramine HCl (Benadryl) 25 mg 1X PRN  PRN IV ITCHING;  Start 3/20/20 at 

08:15;  Stop 3/21/20 at 08:14;  Status DC


Sodium Chloride 1,000 ml @  400 mls/hr Q2H30M PRN IV PATENCY;  Start 3/20/20 at 

08:05;  Stop 3/20/20 at 20:04;  Status DC


Info (PHARMACY MONITORING -- do not chart) 1 each PRN DAILY  PRN MC SEE 

COMMENTS;  Start 3/20/20 at 08:15;  Stop 3/24/20 at 07:57;  Status DC


Sodium Chloride 90 meq/Potassium Chloride 15 meq/ Potassium Phosphate 10 mmol/ 

Magnesium Sulfate 10 meq/Calcium Gluconate 20 meq/ Multivitamins 10 ml/Chromium/

Copper/Manganese/ Seleni/Zn 0.5 ml/ Total Parenteral Nutrition/Amino 

Acids/Dextrose/ Fat Emulsion Intravenous 1,512 ml @  63 mls/hr TPN  CONT IV  

Last administered on 3/20/20at 21:01;  Start 3/20/20 at 22:00;  Stop 3/21/20 at 

21:59;  Status DC


Potassium Chloride/Water 100 ml @  100 mls/hr 1X  ONCE IV  Last administered on 

3/20/20at 14:09;  Start 3/20/20 at 14:00;  Stop 3/20/20 at 14:59;  Status DC


Benzocaine (Hurricaine One) 1 spray 1X  ONCE MM  Last administered on 3/20/20at 

16:38;  Start 3/20/20 at 14:30;  Stop 3/20/20 at 14:31;  Status DC


Lidocaine HCl (Glydo (Lidocaine) Jelly) 1 thomas 1X  ONCE MM  Last administered on 

3/20/20at 16:38;  Start 3/20/20 at 14:30;  Stop 3/20/20 at 14:31;  Status DC


Linezolid/Dextrose 300 ml @  300 mls/hr Q12HR IV  Last administered on 3/26/20at

21:04;  Start 3/20/20 at 20:00;  Stop 3/27/20 at 07:50;  Status DC


Acetaminophen (Tylenol) 650 mg PRN Q6HRS  PRN PO MILD PAIN / TEMP;  Start 

3/21/20 at 03:30;  Stop 3/21/20 at 03:36;  Status DC


Acetaminophen (Tylenol) 650 mg PRN Q6HRS  PRN PEG MILD PAIN / TEMP Last 

administered on 4/16/20at 19:56;  Start 3/21/20 at 03:36;  Stop 5/13/20 at 

10:25;  Status DC


Sodium Chloride 1,000 ml @  1,000 mls/hr Q1H PRN IV hypotension;  Start 3/21/20 

at 07:50;  Stop 3/21/20 at 13:49;  Status DC


Albumin Human 200 ml @  200 mls/hr 1X PRN  PRN IV Hypotension;  Start 3/21/20 at

08:00;  Stop 3/21/20 at 13:59;  Status DC


Sodium Chloride (Normal Saline Flush) 10 ml 1X PRN  PRN IV AP catheter pack;  

Start 3/21/20 at 08:00;  Stop 3/22/20 at 07:59;  Status DC


Sodium Chloride (Normal Saline Flush) 10 ml 1X PRN  PRN IV  catheter pack;  

Start 3/21/20 at 08:00;  Stop 3/22/20 at 07:59;  Status DC


Sodium Chloride 1,000 ml @  400 mls/hr Q2H30M PRN IV PATENCY;  Start 3/21/20 at 

07:50;  Stop 3/21/20 at 19:49;  Status DC


Info (PHARMACY MONITORING -- do not chart) 1 each PRN DAILY  PRN MC SEE 

COMMENTS;  Start 3/21/20 at 08:00;  Status UNV


Info (PHARMACY MONITORING -- do not chart) 1 each PRN DAILY  PRN MC SEE 

COMMENTS;  Start 3/21/20 at 08:00;  Stop 3/23/20 at 08:25;  Status DC


Sodium Chloride 90 meq/Potassium Chloride 15 meq/ Potassium Phosphate 10 mmol/ 

Magnesium Sulfate 10 meq/Calcium Gluconate 20 meq/ Multivitamins 10 ml/Chromium/

Copper/Manganese/ Seleni/Zn 0.5 ml/ Total Parenteral Nutrition/Amino 

Acids/Dextrose/ Fat Emulsion Intravenous 1,512 ml @  63 mls/hr TPN  CONT IV  

Last administered on 3/21/20at 20:57;  Start 3/21/20 at 22:00;  Stop 3/22/20 at 

21:59;  Status DC


Sodium Chloride 90 meq/Potassium Chloride 15 meq/ Potassium Phosphate 15 mmol/ 

Magnesium Sulfate 10 meq/Calcium Gluconate 20 meq/ Multivitamins 10 ml/Chromium/

Copper/Manganese/ Seleni/Zn 0.5 ml/ Total Parenteral Nutrition/Amino 

Acids/Dextrose/ Fat Emulsion Intravenous 1,512 ml @  63 mls/hr TPN  CONT IV ;  

Start 3/22/20 at 22:00;  Stop 3/22/20 at 14:16;  Status DC


Sodium Chloride 90 meq/Potassium Chloride 15 meq/ Potassium Phosphate 15 mmol/ 

Magnesium Sulfate 10 meq/Calcium Gluconate 20 meq/ Multivitamins 10 ml/Chromium/

Copper/Manganese/ Seleni/Zn 0.5 ml/ Total Parenteral Nutrition/Amino 

Acids/Dextrose/ Fat Emulsion Intravenous 1,200 ml @  50 mls/hr TPN  CONT IV ;  

Start 3/22/20 at 22:00;  Stop 3/22/20 at 14:17;  Status DC


Sodium Chloride 90 meq/Potassium Chloride 15 meq/ Potassium Phosphate 10 mmol/ 

Magnesium Sulfate 10 meq/Calcium Gluconate 20 meq/ Multivitamins 10 ml/Chromium/

Copper/Manganese/ Seleni/Zn 0.5 ml/ Total Parenteral Nutrition/Amino 

Acids/Dextrose/ Fat Emulsion Intravenous 1,200 ml @  50 mls/hr TPN  CONT IV  

Last administered on 3/22/20at 23:29;  Start 3/22/20 at 22:00;  Stop 3/23/20 at 

21:59;  Status DC


Sodium Chloride 1,000 ml @  1,000 mls/hr Q1H PRN IV hypotension;  Start 3/23/20 

at 07:28;  Stop 3/23/20 at 13:27;  Status DC


Albumin Human 200 ml @  200 mls/hr 1X  ONCE IV  Last administered on 3/23/20at 

08:51;  Start 3/23/20 at 07:30;  Stop 3/23/20 at 08:29;  Status DC


Diphenhydramine HCl (Benadryl) 25 mg 1X PRN  PRN IV ITCHING;  Start 3/23/20 at 

07:30;  Stop 3/24/20 at 07:29;  Status DC


Diphenhydramine HCl (Benadryl) 25 mg 1X PRN  PRN IV ITCHING;  Start 3/23/20 at 

07:30;  Stop 3/24/20 at 07:29;  Status DC


Sodium Chloride 1,000 ml @  400 mls/hr Q2H30M PRN IV PATENCY;  Start 3/23/20 at 

07:28;  Stop 3/23/20 at 19:27;  Status DC


Info (PHARMACY MONITORING -- do not chart) 1 each PRN DAILY  PRN MC SEE 

COMMENTS;  Start 3/23/20 at 07:30;  Stop 4/3/20 at 13:01;  Status DC


Metronidazole 100 ml @  100 mls/hr Q6HRS IV  Last administered on 4/8/20at 

06:26;  Start 3/23/20 at 08:30;  Stop 4/8/20 at 09:58;  Status DC


Micafungin Sodium 100 mg/Dextrose 100 ml @  100 mls/hr Q24H IV  Last 

administered on 4/30/20at 08:18;  Start 3/23/20 at 09:00;  Stop 4/30/20 at 

20:58;  Status DC


Propofol 0 ml @ As Directed STK-MED ONCE IV ;  Start 3/23/20 at 07:53;  Stop 

3/23/20 at 07:53;  Status DC


Etomidate (Amidate) 20 mg STK-MED ONCE IV ;  Start 3/23/20 at 07:53;  Stop 

3/23/20 at 07:54;  Status DC


Midazolam HCl (Versed) 5 mg STK-MED ONCE .ROUTE ;  Start 3/23/20 at 07:57;  Stop

3/23/20 at 07:57;  Status DC


Fentanyl Citrate 30 ml @ 0 mls/hr CONT  PRN IV SEE PROTOCOL Last administered on

4/17/20at 06:12;  Start 3/23/20 at 08:15;  Stop 4/17/20 at 09:19;  Status DC


Artificial Tears (Artificial Tears) 1 drop PRN Q1HR  PRN OU DRY EYE, 1st choice;

 Start 3/23/20 at 08:15;  Stop 4/29/20 at 05:31;  Status DC


Midazolam HCl 50 mg/Sodium Chloride 50 ml @ 0 mls/hr CONT  PRN IV SEE PROTOCOL 

Last administered on 3/26/20at 22:39;  Start 3/23/20 at 08:15;  Stop 3/28/20 at 

15:59;  Status DC


Etomidate (Amidate) 8 mg 1X  ONCE IV  Last administered on 3/23/20at 08:33;  

Start 3/23/20 at 08:30;  Stop 3/23/20 at 08:31;  Status DC


Succinylcholine Chloride (Anectine) 120 mg 1X  ONCE IV  Last administered on 

3/23/20at 08:34;  Start 3/23/20 at 08:30;  Stop 3/23/20 at 08:31;  Status DC


Midazolam HCl (Versed) 5 mg 1X  ONCE IV ;  Start 3/23/20 at 08:30;  Stop 3/23/20

at 08:31;  Status DC


Potassium Chloride 15 meq/ Bicarbonate Dialysis Soln w/ out KCl 5,007.5 ml  @ 

1,000 mls/ hr Q5H1M IV  Last administered on 3/24/20at 11:11;  Start 3/23/20 at 

12:00;  Stop 3/24/20 at 11:15;  Status DC


Potassium Chloride 15 meq/ Bicarbonate Dialysis Soln w/ out KCl 5,007.5 ml  @ 

1,000 mls/ hr Q5H1M IV  Last administered on 3/24/20at 11:12;  Start 3/23/20 at 

12:00;  Stop 3/24/20 at 11:17;  Status DC


Potassium Chloride 15 meq/ Bicarbonate Dialysis Soln w/ out KCl 5,007.5 ml  @ 

1,000 mls/ hr Q5H1M IV  Last administered on 3/24/20at 11:11;  Start 3/23/20 at 

12:00;  Stop 3/24/20 at 11:19;  Status DC


Sodium Chloride 90 meq/Potassium Chloride 15 meq/ Potassium Phosphate 10 mmol/ 

Magnesium Sulfate 10 meq/Calcium Gluconate 20 meq/ Multivitamins 10 ml/Chromium/

Copper/Manganese/ Seleni/Zn 0.5 ml/ Total Parenteral Nutrition/Amino 

Acids/Dextrose/ Fat Emulsion Intravenous 1,400 ml @  58.333 mls/ hr TPN  CONT IV

 Last administered on 3/23/20at 21:42;  Start 3/23/20 at 22:00;  Stop 3/24/20 at

21:59;  Status DC


Heparin Sodium (Porcine) (Heparin Sodium) 5,000 unit Q8HRS SQ  Last administered

on 3/28/20at 05:55;  Start 3/23/20 at 15:00;  Stop 3/28/20 at 13:28;  Status DC


Meropenem 500 mg/ Sodium Chloride 50 ml @  100 mls/hr Q6HRS IV  Last 

administered on 3/25/20at 06:00;  Start 3/24/20 at 09:00;  Stop 3/25/20 at 07

:29;  Status DC


Potassium Phosphate 20 mmol/ Sodium Chloride 106.6667 ml @  51.667 m... 1X  ONCE

IV  Last administered on 3/24/20at 11:22;  Start 3/24/20 at 10:15;  Stop 3/24/20

at 12:18;  Status DC


Acetaminophen (Tylenol Supp) 650 mg PRN Q6HRS  PRN MD MILD PAIN / TEMP > 100.3'F

Last administered on 8/10/20at 15:43;  Start 3/24/20 at 10:30


Potassium Chloride/Water 100 ml @  100 mls/hr Q1H IV  Last administered on 

3/24/20at 12:12;  Start 3/24/20 at 11:00;  Stop 3/24/20 at 12:59;  Status DC


Potassium Chloride 20 meq/ Bicarbonate Dialysis Soln w/ out KCl 5,010 ml @  

1,000 mls/hr Q5H1M IV  Last administered on 3/25/20at 08:48;  Start 3/24/20 at 

12:00;  Stop 3/25/20 at 13:03;  Status DC


Potassium Chloride 20 meq/ Bicarbonate Dialysis Soln w/ out KCl 5,010 ml @  1

,000 mls/hr Q5H1M IV  Last administered on 3/29/20at 14:52;  Start 3/24/20 at 

11:30;  Stop 3/29/20 at 19:59;  Status DC


Potassium Chloride 20 meq/ Bicarbonate Dialysis Soln w/ out KCl 5,010 ml @  

1,000 mls/hr Q5H1M IV  Last administered on 3/29/20at 14:53;  Start 3/24/20 at 

11:30;  Stop 3/29/20 at 19:59;  Status DC


Sodium Chloride 90 meq/Potassium Chloride 15 meq/ Potassium Phosphate 15 mmol/ 

Magnesium Sulfate 10 meq/Calcium Gluconate 15 meq/ Multivitamins 10 ml/Chromium/

Copper/Manganese/ Seleni/Zn 0.5 ml/ Total Parenteral Nutrition/Amino 

Acids/Dextrose/ Fat Emulsion Intravenous 1,400 ml @  58.333 mls/ hr TPN  CONT IV

 Last administered on 3/24/20at 22:17;  Start 3/24/20 at 22:00;  Stop 3/25/20 at

21:59;  Status DC


Cefepime HCl (Maxipime) 2 gm Q12HR IVP  Last administered on 4/7/20at 20:56;  

Start 3/25/20 at 09:00;  Stop 4/8/20 at 09:58;  Status DC


Daptomycin 500 mg/ Sodium Chloride 50 ml @  100 mls/hr Q48H IV  Last 

administered on 4/10/20at 09:57;  Start 3/25/20 at 08:30;  Stop 4/10/20 at 

10:07;  Status DC


Lidocaine HCl (Buffered Lidocaine 1%) 3 ml 1X  ONCE INJ  Last administered on 

3/25/20at 10:27;  Start 3/25/20 at 10:30;  Stop 3/25/20 at 10:31;  Status DC


Potassium Phosphate 20 mmol/ Sodium Chloride 106.6667 ml @  51.667 m... 1X  ONCE

IV  Last administered on 3/25/20at 12:51;  Start 3/25/20 at 13:00;  Stop 3/25/20

at 15:03;  Status DC


Sodium Chloride 90 meq/Potassium Chloride 15 meq/ Potassium Phosphate 18 mmol/ 

Magnesium Sulfate 8 meq/Calcium Gluconate 15 meq/ Multivitamins 10 ml/Chromium/ 

Copper/Manganese/ Seleni/Zn 0.5 ml/ Total Parenteral Nutrition/Amino 

Acids/Dextrose/ Fat Emulsion Intravenous 1,400 ml @  58.333 mls/ hr TPN  CONT IV

 Last administered on 3/25/20at 22:16;  Start 3/25/20 at 22:00;  Stop 3/26/20 at

21:59;  Status DC


Potassium Chloride 20 meq/ Bicarbonate Dialysis Soln w/ out KCl 5,010 ml @  

1,000 mls/hr Q5H1M IV  Last administered on 3/29/20at 14:54;  Start 3/25/20 at 

16:00;  Stop 3/29/20 at 19:59;  Status DC


Multi-Ingred Cream/Lotion/Oil/ Oint (Artificial Tears Eye Ointment) 1 thomas PRN 

Q1HR  PRN OU DRY EYE, 2nd choice Last administered on 4/13/20at 08:19;  Start 

3/25/20 at 17:30;  Stop 6/3/20 at 14:39;  Status DC


Sodium Chloride 90 meq/Potassium Chloride 15 meq/ Potassium Phosphate 18 mmol/ 

Magnesium Sulfate 8 meq/Calcium Gluconate 15 meq/ Multivitamins 10 ml/Chromium/ 

Copper/Manganese/ Seleni/Zn 0.5 ml/ Total Parenteral Nutrition/Amino 

Acids/Dextrose/ Fat Emulsion Intravenous 1,400 ml @  58.333 mls/ hr TPN  CONT IV

 Last administered on 3/26/20at 22:00;  Start 3/26/20 at 22:00;  Stop 3/27/20 at

21:59;  Status DC


Albumin Human 500 ml @  125 mls/hr 1X  ONCE IV ;  Start 3/26/20 at 14:15;  Stop 

3/26/20 at 18:14;  Status DC


Sodium Chloride 90 meq/Potassium Chloride 15 meq/ Potassium Phosphate 18 mmol/ 

Magnesium Sulfate 8 meq/Calcium Gluconate 15 meq/ Multivitamins 10 ml/Chromium/ 

Copper/Manganese/ Seleni/Zn 0.5 ml/ Insulin Human Regular 10 unit/ Total 

Parenteral Nutrition/Amino Acids/Dextrose/ Fat Emulsion Intravenous 1,400 ml @  

58.333 mls/ hr TPN  CONT IV  Last administered on 3/27/20at 21:43;  Start 

3/27/20 at 22:00;  Stop 3/28/20 at 21:59;  Status DC


Lidocaine HCl (Buffered Lidocaine 1%) 3 ml STK-MED ONCE .ROUTE ;  Start 3/25/20 

at 10:00;  Stop 3/27/20 at 13:57;  Status DC


Midazolam HCl 100 mg/Sodium Chloride 100 ml @ 7 mls/hr CONT  PRN IV SEE PROTOCOL

Last administered on 4/8/20at 15:35;  Start 3/28/20 at 16:00;  Stop 6/3/20 at 

14:38;  Status DC


Sodium Chloride 90 meq/Potassium Chloride 15 meq/ Potassium Phosphate 18 mmol/ 

Magnesium Sulfate 8 meq/Calcium Gluconate 15 meq/ Multivitamins 10 ml/Chromium/ 

Copper/Manganese/ Seleni/Zn 0.5 ml/ Insulin Human Regular 15 unit/ Total 

Parenteral Nutrition/Amino Acids/Dextrose/ Fat Emulsion Intravenous 1,400 ml @  

58.333 mls/ hr TPN  CONT IV  Last administered on 3/28/20at 20:34;  Start 

3/28/20 at 22:00;  Stop 3/29/20 at 21:59;  Status DC


Info ([facility] Electrolyte Protocol) 1 ea CONT PRN  PRN MC PER PROTOCOL;  Start 

3/29/20 at 13:15


Sodium Chloride 90 meq/Potassium Chloride 15 meq/ Potassium Phosphate 18 mmol/ 

Magnesium Sulfate 8 meq/Calcium Gluconate 15 meq/ Multivitamins 10 ml/Chromium/ 

Copper/Manganese/ Seleni/Zn 0.5 ml/ Insulin Human Regular 15 unit/ Total 

Parenteral Nutrition/Amino Acids/Dextrose/ Fat Emulsion Intravenous 1,400 ml @  

58.333 mls/ hr TPN  CONT IV  Last administered on 3/29/20at 22:05;  Start 3/

29/20 at 22:00;  Stop 3/30/20 at 21:59;  Status DC


Potassium Chloride 15 meq/ Bicarbonate Dialysis Soln w/ out KCl 5,007.5 ml  @ 

1,000 mls/ hr Q5H1M IV  Last administered on 4/1/20at 18:14;  Start 3/29/20 at 

20:00;  Stop 4/2/20 at 13:08;  Status DC


Potassium Chloride 15 meq/ Bicarbonate Dialysis Soln w/ out KCl 5,007.5 ml  @ 

1,000 mls/ hr Q5H1M IV  Last administered on 4/1/20at 18:14;  Start 3/29/20 at 

20:00;  Stop 4/2/20 at 13:08;  Status DC


Potassium Chloride 15 meq/ Bicarbonate Dialysis Soln w/ out KCl 5,007.5 ml  @ 

1,000 mls/ hr Q5H1M IV  Last administered on 4/1/20at 18:14;  Start 3/29/20 at 

20:00;  Stop 4/2/20 at 13:08;  Status DC


Iohexol (Omnipaque 240 Mg/ml) 30 ml 1X  ONCE PO  Last administered on 3/30/20at 

11:30;  Start 3/30/20 at 11:30;  Stop 3/30/20 at 11:33;  Status DC


Info (CONTRAST GIVEN -- Rx MONITORING) 1 each PRN DAILY  PRN MC SEE COMMENTS;  

Start 3/30/20 at 11:45;  Stop 4/1/20 at 11:44;  Status DC


Sodium Chloride 90 meq/Potassium Chloride 15 meq/ Potassium Phosphate 18 mmol/ 

Magnesium Sulfate 8 meq/Calcium Gluconate 15 meq/ Multivitamins 10 ml/Chromium/ 

Copper/Manganese/ Seleni/Zn 0.5 ml/ Insulin Human Regular 15 unit/ Total 

Parenteral Nutrition/Amino Acids/Dextrose/ Fat Emulsion Intravenous 1,400 ml @  

58.333 mls/ hr TPN  CONT IV  Last administered on 3/30/20at 21:47;  Start 

3/30/20 at 22:00;  Stop 3/31/20 at 21:59;  Status DC


Sodium Chloride 90 meq/Potassium Chloride 15 meq/ Potassium Phosphate 18 mmol/ 

Magnesium Sulfate 8 meq/Calcium Gluconate 15 meq/ Multivitamins 10 ml/Chromium/ 

Copper/Manganese/ Seleni/Zn 0.5 ml/ Insulin Human Regular 20 unit/ Total 

Parenteral Nutrition/Amino Acids/Dextrose/ Fat Emulsion Intravenous 1,400 ml @  

58.333 mls/ hr TPN  CONT IV  Last administered on 3/31/20at 21:36;  Start 

3/31/20 at 22:00;  Stop 4/1/20 at 21:59;  Status DC


Alteplase, Recombinant (Cathflo For Central Catheter Clearance) 1 mg 1X  ONCE 

INT CAT  Last administered on 3/31/20at 20:03;  Start 3/31/20 at 19:30;  Stop 

3/31/20 at 19:46;  Status DC


Alteplase, Recombinant (Cathflo For Central Catheter Clearance) 1 mg 1X  ONCE 

INT CAT  Last administered on 3/31/20at 22:05;  Start 3/31/20 at 22:00;  Stop 

3/31/20 at 22:01;  Status DC


Sodium Chloride 90 meq/Potassium Chloride 15 meq/ Potassium Phosphate 18 mmol/ 

Magnesium Sulfate 8 meq/Calcium Gluconate 15 meq/ Multivitamins 10 ml/Chromium/ 

Copper/Manganese/ Seleni/Zn 0.5 ml/ Insulin Human Regular 20 unit/ Total 

Parenteral Nutrition/Amino Acids/Dextrose/ Fat Emulsion Intravenous 1,400 ml @  

58.333 mls/ hr TPN  CONT IV  Last administered on 4/1/20at 21:30;  Start 4/1/20 

at 22:00;  Stop 4/2/20 at 21:59;  Status DC


Dexmedetomidine HCl 400 mcg/ Sodium Chloride 100 ml @ 0 mls/hr CONT  PRN IV 

ANXIETY / AGITATION Last administered on 5/30/20at 12:57;  Start 4/2/20 at 

08:15;  Stop 5/30/20 at 18:31;  Status DC


Sodium Chloride 500 ml @  500 mls/hr 1X PRN  PRN IV ELEVATED BP, SEE COMMENTS;  

Start 4/2/20 at 08:15;  Stop 8/5/20 at 09:08;  Status DC


Atropine Sulfate (ATROPINE 0.5mg SYRINGE) 0.5 mg PRN Q5MIN  PRN IV SEE COMMENTS;

 Start 4/2/20 at 08:15;  Stop 8/3/20 at 09:45;  Status DC


Furosemide (Lasix) 20 mg 1X  ONCE IVP  Last administered on 4/2/20at 08:19;  

Start 4/2/20 at 08:15;  Stop 4/2/20 at 08:16;  Status DC


Lidocaine HCl (Buffered Lidocaine 1%) 3 ml STK-MED ONCE .ROUTE ;  Start 4/2/20 

at 08:39;  Stop 4/2/20 at 08:39;  Status DC


Lidocaine HCl (Buffered Lidocaine 1%) 6 ml 1X  ONCE INJ  Last administered on 

4/2/20at 09:05;  Start 4/2/20 at 09:00;  Stop 4/2/20 at 09:06;  Status DC


Sodium Chloride 90 meq/Potassium Chloride 15 meq/ Potassium Phosphate 18 mmol/ 

Magnesium Sulfate 8 meq/Calcium Gluconate 15 meq/ Multivitamins 10 ml/Chromium/ 

Copper/Manganese/ Seleni/Zn 0.5 ml/ Insulin Human Regular 20 unit/ Total 

Parenteral Nutrition/Amino Acids/Dextrose/ Fat Emulsion Intravenous 1,400 ml @  

58.333 mls/ hr TPN  CONT IV  Last administered on 4/2/20at 22:45;  Start 4/2/20 

at 22:00;  Stop 4/3/20 at 21:59;  Status DC


Sodium Chloride 1,000 ml @  1,000 mls/hr Q1H PRN IV hypotension;  Start 4/3/20 

at 07:30;  Stop 4/3/20 at 13:29;  Status DC


Albumin Human 200 ml @  200 mls/hr 1X PRN  PRN IV Hypotension Last administered 

on 4/3/20at 09:36;  Start 4/3/20 at 07:30;  Stop 4/3/20 at 13:29;  Status DC


Sodium Chloride (Normal Saline Flush) 10 ml 1X PRN  PRN IV AP catheter pack;  

Start 4/3/20 at 07:30;  Stop 4/3/20 at 21:29;  Status DC


Sodium Chloride (Normal Saline Flush) 10 ml 1X PRN  PRN IV  catheter pack;  

Start 4/3/20 at 07:30;  Stop 4/4/20 at 07:29;  Status DC


Sodium Chloride 1,000 ml @  400 mls/hr Q2H30M PRN IV PATENCY;  Start 4/3/20 at 

07:30;  Stop 4/3/20 at 19:29;  Status DC


Info (PHARMACY MONITORING -- do not chart) 1 each PRN DAILY  PRN MC SEE 

COMMENTS;  Start 4/3/20 at 07:30;  Stop 4/3/20 at 13:02;  Status DC


Info (PHARMACY MONITORING -- do not chart) 1 each PRN DAILY  PRN MC SEE 

COMMENTS;  Start 4/3/20 at 07:30;  Stop 4/5/20 at 12:45;  Status DC


Sodium Chloride 90 meq/Potassium Chloride 15 meq/ Potassium Phosphate 10 mmol/ 

Magnesium Sulfate 8 meq/Calcium Gluconate 15 meq/ Multivitamins 10 ml/Chromium/ 

Copper/Manganese/ Seleni/Zn 0.5 ml/ Insulin Human Regular 25 unit/ Total 

Parenteral Nutrition/Amino Acids/Dextrose/ Fat Emulsion Intravenous 1,400 ml @  

58.333 mls/ hr TPN  CONT IV  Last administered on 4/3/20at 22:19;  Start 4/3/20 

at 22:00;  Stop 4/4/20 at 21:59;  Status DC


Heparin Sodium (Porcine) (Heparin Sodium) 5,000 unit Q12HR SQ  Last administered

on 4/26/20at 08:59;  Start 4/3/20 at 21:00;  Stop 4/26/20 at 10:05;  Status DC


Ondansetron HCl (Zofran) 4 mg PRN Q6HRS  PRN IV NAUSEA/VOMITING;  Start 4/6/20 

at 07:00;  Stop 4/7/20 at 06:59;  Status DC


Fentanyl Citrate (Fentanyl 2ml Vial) 25 mcg PRN Q5MIN  PRN IV MILD PAIN 1-3;  

Start 4/6/20 at 07:00;  Stop 4/7/20 at 06:59;  Status DC


Fentanyl Citrate (Fentanyl 2ml Vial) 50 mcg PRN Q5MIN  PRN IV MODERATE TO SEVERE

PAIN;  Start 4/6/20 at 07:00;  Stop 4/7/20 at 06:59;  Status DC


Ringer's Solution 1,000 ml @  30 mls/hr Q24H IV ;  Start 4/6/20 at 07:00;  Stop 

4/6/20 at 18:59;  Status DC


Lidocaine HCl (Xylocaine-Mpf 1% 2ml Vial) 2 ml PRN 1X  PRN ID PRIOR TO IV START;

 Start 4/6/20 at 07:00;  Stop 4/7/20 at 06:59;  Status DC


Prochlorperazine Edisylate (Compazine) 5 mg PACU PRN  PRN IV NAUSEA, MRX1;  

Start 4/6/20 at 07:00;  Stop 4/7/20 at 06:59;  Status DC


Sodium Chloride 1,000 ml @  1,000 mls/hr Q1H PRN IV hypotension;  Start 4/4/20 

at 09:10;  Stop 4/4/20 at 15:09;  Status DC


Albumin Human 200 ml @  200 mls/hr 1X PRN  PRN IV Hypotension Last administered 

on 4/4/20at 10:10;  Start 4/4/20 at 09:15;  Stop 4/4/20 at 15:14;  Status DC


Sodium Chloride 1,000 ml @  400 mls/hr Q2H30M PRN IV PATENCY;  Start 4/4/20 at 

09:10;  Stop 4/4/20 at 21:09;  Status DC


Info (PHARMACY MONITORING -- do not chart) 1 each PRN DAILY  PRN MC SEE 

COMMENTS;  Start 4/4/20 at 09:15;  Stop 4/5/20 at 12:45;  Status DC


Info (PHARMACY MONITORING -- do not chart) 1 each PRN DAILY  PRN MC SEE 

COMMENTS;  Start 4/4/20 at 09:15;  Stop 4/5/20 at 12:45;  Status DC


Sodium Chloride 90 meq/Potassium Chloride 15 meq/ Potassium Phosphate 10 mmol/ 

Magnesium Sulfate 8 meq/Calcium Gluconate 15 meq/ Multivitamins 10 ml/Chromium/ 

Copper/Manganese/ Seleni/Zn 0.5 ml/ Insulin Human Regular 25 unit/ Total P

arenteral Nutrition/Amino Acids/Dextrose/ Fat Emulsion Intravenous 1,400 ml @  

58.333 mls/ hr TPN  CONT IV  Last administered on 4/4/20at 22:10;  Start 4/4/20 

at 22:00;  Stop 4/5/20 at 21:59;  Status DC


Magnesium Sulfate 50 ml @ 25 mls/hr PRN DAILY  PRN IV for Mag < 1.7 on am labs 

Last administered on 6/18/20at 10:57;  Start 4/5/20 at 09:15


Sodium Chloride 90 meq/Potassium Chloride 15 meq/ Potassium Phosphate 10 mmol/ 

Magnesium Sulfate 8 meq/Calcium Gluconate 15 meq/ Multivitamins 10 ml/Chromium/ 

Copper/Manganese/ Seleni/Zn 0.5 ml/ Insulin Human Regular 25 unit/ Total 

Parenteral Nutrition/Amino Acids/Dextrose/ Fat Emulsion Intravenous 1,400 ml @  

58.333 mls/ hr TPN  CONT IV  Last administered on 4/5/20at 21:20;  Start 4/5/20 

at 22:00;  Stop 4/6/20 at 21:59;  Status DC


Sodium Chloride 1,000 ml @  1,000 mls/hr Q1H PRN IV hypotension;  Start 4/5/20 

at 12:23;  Stop 4/5/20 at 18:22;  Status DC


Albumin Human 200 ml @  200 mls/hr 1X  ONCE IV  Last administered on 4/5/20at 

13:34;  Start 4/5/20 at 12:30;  Stop 4/5/20 at 13:29;  Status DC


Diphenhydramine HCl (Benadryl) 25 mg 1X PRN  PRN IV ITCHING;  Start 4/5/20 at 

12:30;  Stop 4/6/20 at 12:29;  Status DC


Diphenhydramine HCl (Benadryl) 25 mg 1X PRN  PRN IV ITCHING;  Start 4/5/20 at 

12:30;  Stop 4/6/20 at 12:29;  Status DC


Info (PHARMACY MONITORING -- do not chart) 1 each PRN DAILY  PRN MC SEE 

COMMENTS;  Start 4/5/20 at 12:30;  Status Cancel


Bupivacaine HCl/ Epinephrine Bitart (Sensorcain-Epi 0.5%-1:601148 Mpf) 30 ml 

STK-MED ONCE .ROUTE  Last administered on 4/6/20at 11:44;  Start 4/6/20 at 

11:00;  Stop 4/6/20 at 11:01;  Status DC


Cellulose (Surgicel Fibrillar 1x2) 1 each STK-MED ONCE .ROUTE ;  Start 4/6/20 at

11:00;  Stop 4/6/20 at 11:01;  Status DC


Sodium Chloride 90 meq/Potassium Chloride 15 meq/ Potassium Phosphate 10 mmol/ 

Magnesium Sulfate 12 meq/Calcium Gluconate 15 meq/ Multivitamins 10 ml/Chromium/

Copper/Manganese/ Seleni/Zn 0.5 ml/ Insulin Human Regular 25 unit/ Total 

Parenteral Nutrition/Amino Acids/Dextrose/ Fat Emulsion Intravenous 1,400 ml @  

58.333 mls/ hr TPN  CONT IV  Last administered on 4/6/20at 22:24;  Start 4/6/20 

at 22:00;  Stop 4/7/20 at 21:59;  Status DC


Propofol 20 ml @ As Directed STK-MED ONCE IV ;  Start 4/6/20 at 11:07;  Stop 

4/6/20 at 11:07;  Status DC


Cellulose (Surgicel Hemostat 4x8) 1 each STK-MED ONCE .ROUTE  Last administered 

on 4/6/20at 11:44;  Start 4/6/20 at 11:55;  Stop 4/6/20 at 11:56;  Status DC


Sevoflurane (Ultane) 60 ml STK-MED ONCE IH ;  Start 4/6/20 at 12:46;  Stop 

4/6/20 at 12:46;  Status DC


Sodium Chloride 1,000 ml @  1,000 mls/hr Q1H PRN IV hypotension;  Start 4/6/20 

at 13:51;  Stop 4/6/20 at 19:50;  Status DC


Albumin Human 200 ml @  200 mls/hr 1X PRN  PRN IV Hypotension Last administered 

on 4/6/20at 14:51;  Start 4/6/20 at 14:00;  Stop 4/6/20 at 19:59;  Status DC


Diphenhydramine HCl (Benadryl) 25 mg 1X PRN  PRN IV ITCHING;  Start 4/6/20 at 

14:00;  Stop 4/7/20 at 13:59;  Status DC


Diphenhydramine HCl (Benadryl) 25 mg 1X PRN  PRN IV ITCHING;  Start 4/6/20 at 

14:00;  Stop 4/7/20 at 13:59;  Status DC


Sodium Chloride 1,000 ml @  400 mls/hr Q2H30M PRN IV PATENCY;  Start 4/6/20 at 

13:51;  Stop 4/7/20 at 01:50;  Status DC


Info (PHARMACY MONITORING -- do not chart) 1 each PRN DAILY  PRN MC SEE 

COMMENTS;  Start 4/6/20 at 14:00;  Stop 4/9/20 at 08:16;  Status DC


Heparin Sodium (Porcine) (Hep Lock Adult) 500 unit STK-MED ONCE IVP ;  Start 

4/7/20 at 09:29;  Stop 4/7/20 at 09:30;  Status DC


Sodium Chloride 1,000 ml @  1,000 mls/hr Q1H PRN IV hypotension;  Start 4/7/20 

at 10:43;  Stop 4/7/20 at 16:42;  Status DC


Sodium Chloride 1,000 ml @  400 mls/hr Q2H30M PRN IV PATENCY;  Start 4/7/20 at 

10:43;  Stop 4/7/20 at 22:42;  Status DC


Info (PHARMACY MONITORING -- do not chart) 1 each PRN DAILY  PRN MC SEE 

COMMENTS;  Start 4/7/20 at 10:45;  Status UNV


Info (PHARMACY MONITORING -- do not chart) 1 each PRN DAILY  PRN MC SEE 

COMMENTS;  Start 4/7/20 at 10:45;  Status UNV


Sodium Chloride 90 meq/Potassium Chloride 15 meq/ Magnesium Sulfate 12 

meq/Calcium Gluconate 15 meq/ Multivitamins 10 ml/Chromium/ Copper/Manganese/ 

Seleni/Zn 0.5 ml/ Insulin Human Regular 25 unit/ Total Parenteral 

Nutrition/Amino Acids/Dextrose/ Fat Emulsion Intravenous 1,400 ml @  58.333 mls/

hr TPN  CONT IV  Last administered on 4/7/20at 22:13;  Start 4/7/20 at 22:00;  

Stop 4/8/20 at 21:59;  Status DC


Sodium Chloride 1,000 ml @  1,000 mls/hr Q1H PRN IV hypotension;  Start 4/8/20 

at 07:50;  Stop 4/8/20 at 13:49;  Status DC


Albumin Human 200 ml @  200 mls/hr 1X  ONCE IV ;  Start 4/8/20 at 08:00;  Stop 

4/8/20 at 08:53;  Status DC


Diphenhydramine HCl (Benadryl) 25 mg 1X PRN  PRN IV ITCHING;  Start 4/8/20 at 

08:00;  Stop 4/9/20 at 07:59;  Status DC


Diphenhydramine HCl (Benadryl) 25 mg 1X PRN  PRN IV ITCHING;  Start 4/8/20 at 

08:00;  Stop 4/9/20 at 07:59;  Status DC


Info (PHARMACY MONITORING -- do not chart) 1 each PRN DAILY  PRN MC SEE 

COMMENTS;  Start 4/8/20 at 08:00;  Stop 4/9/20 at 08:16;  Status DC


Albumin Human 50 ml @ 50 mls/hr 1X  ONCE IV ;  Start 4/8/20 at 08:53;  Stop 

4/8/20 at 08:56;  Status DC


Albumin Human 200 ml @  50 mls/hr PRN 1X  PRN IV HYPOTENSION Last administered 

on 4/14/20at 11:54;  Start 4/8/20 at 09:00;  Stop 5/21/20 at 11:14;  Status DC


Meropenem 500 mg/ Sodium Chloride 50 ml @  100 mls/hr Q12H IV  Last administered

on 4/28/20at 10:45;  Start 4/8/20 at 10:00;  Stop 4/28/20 at 12:37;  Status DC


Sodium Chloride 90 meq/Magnesium Sulfate 12 meq/ Calcium Gluconate 15 meq/ 

Multivitamins 10 ml/Chromium/ Copper/Manganese/ Seleni/Zn 0.5 ml/ Insulin Human 

Regular 25 unit/ Total Parenteral Nutrition/Amino Acids/Dextrose/ Fat Emulsion 

Intravenous 1,400 ml @  58.333 mls/ hr TPN  CONT IV  Last administered on 

4/8/20at 21:41;  Start 4/8/20 at 22:00;  Stop 4/9/20 at 21:59;  Status DC


Sodium Chloride 1,000 ml @  1,000 mls/hr Q1H PRN IV hypotension;  Start 4/9/20 

at 07:58;  Stop 4/9/20 at 13:57;  Status DC


Albumin Human 200 ml @  200 mls/hr 1X PRN  PRN IV Hypotension Last administered 

on 4/9/20at 09:30;  Start 4/9/20 at 08:00;  Stop 4/9/20 at 13:59;  Status DC


Sodium Chloride 1,000 ml @  400 mls/hr Q2H30M PRN IV PATENCY;  Start 4/9/20 at 

07:58;  Stop 4/9/20 at 19:57;  Status DC


Info (PHARMACY MONITORING -- do not chart) 1 each PRN DAILY  PRN MC SEE 

COMMENTS;  Start 4/9/20 at 08:00;  Status Cancel


Info (PHARMACY MONITORING -- do not chart) 1 each PRN DAILY  PRN MC SEE 

COMMENTS;  Start 4/9/20 at 08:15;  Status UNV


Sodium Chloride 90 meq/Potassium Phosphate 5 mmol/ Magnesium Sulfate 12 

meq/Calcium Gluconate 15 meq/ Multivitamins 10 ml/Chromium/ Copper/Manganese/ 

Seleni/Zn 0.5 ml/ Insulin Human Regular 30 unit/ Total Parenteral 

Nutrition/Amino Acids/Dextrose/ Fat Emulsion Intravenous 1,400 ml @  58.333 mls/

hr TPN  CONT IV  Last administered on 4/9/20at 22:08;  Start 4/9/20 at 22:00;  

Stop 4/10/20 at 21:59;  Status DC


Linezolid/Dextrose 300 ml @  300 mls/hr Q12HR IV  Last administered on 4/20/20at

20:40;  Start 4/10/20 at 11:00;  Stop 4/21/20 at 08:10;  Status DC


Sodium Chloride 90 meq/Potassium Phosphate 15 mmol/ Magnesium Sulfate 12 

meq/Calcium Gluconate 15 meq/ Multivitamins 10 ml/Chromium/ Copper/Manganese/ 

Seleni/Zn 0.5 ml/ Insulin Human Regular 30 unit/ Total Parenteral 

Nutrition/Amino Acids/Dextrose/ Fat Emulsion Intravenous 1,400 ml @  58.333 mls/

hr TPN  CONT IV  Last administered on 4/10/20at 21:49;  Start 4/10/20 at 22:00; 

Stop 4/11/20 at 21:59;  Status DC


Sodium Chloride 90 meq/Potassium Phosphate 15 mmol/ Magnesium Sulfate 12 

meq/Calcium Gluconate 15 meq/ Multivitamins 10 ml/Chromium/ Copper/Manganese/ 

Seleni/Zn 0.5 ml/ Insulin Human Regular 40 unit/ Total Parenteral 

Nutrition/Amino Acids/Dextrose/ Fat Emulsion Intravenous 1,400 ml @  58.333 mls/

hr TPN  CONT IV  Last administered on 4/11/20at 21:21;  Start 4/11/20 at 22:00; 

Stop 4/12/20 at 21:59;  Status DC


Sodium Chloride 1,000 ml @  1,000 mls/hr Q1H PRN IV hypotension;  Start 4/11/20 

at 13:26;  Stop 4/11/20 at 19:25;  Status DC


Albumin Human 200 ml @  200 mls/hr 1X PRN  PRN IV Hypotension Last administered 

on 4/11/20at 15:00;  Start 4/11/20 at 13:30;  Stop 4/11/20 at 19:29;  Status DC


Sodium Chloride (Normal Saline Flush) 10 ml 1X PRN  PRN IV AP catheter pack;  

Start 4/11/20 at 13:30;  Stop 4/12/20 at 13:29;  Status DC


Sodium Chloride (Normal Saline Flush) 10 ml 1X PRN  PRN IV  catheter pack;  

Start 4/11/20 at 13:30;  Stop 4/12/20 at 13:29;  Status DC


Sodium Chloride 1,000 ml @  400 mls/hr Q2H30M PRN IV PATENCY;  Start 4/11/20 at 

13:26;  Stop 4/12/20 at 01:25;  Status DC


Info (PHARMACY MONITORING -- do not chart) 1 each PRN DAILY  PRN MC SEE 

COMMENTS;  Start 4/11/20 at 13:30;  Stop 4/11/20 at 13:33;  Status DC


Info (PHARMACY MONITORING -- do not chart) 1 each PRN DAILY  PRN MC SEE 

COMMENTS;  Start 4/11/20 at 13:30;  Stop 4/11/20 at 13:34;  Status DC


Sodium Chloride 90 meq/Potassium Phosphate 19 mmol/ Magnesium Sulfate 12 

meq/Calcium Gluconate 15 meq/ Multivitamins 10 ml/Chromium/ Copper/Manganese/ 

Seleni/Zn 0.5 ml/ Insulin Human Regular 40 unit/ Total Parenteral 

Nutrition/Amino Acids/Dextrose/ Fat Emulsion Intravenous 1,400 ml @  58.333 mls/

hr TPN  CONT IV  Last administered on 4/12/20at 21:54;  Start 4/12/20 at 22:00; 

Stop 4/13/20 at 21:59;  Status DC


Sodium Chloride 1,000 ml @  1,000 mls/hr Q1H PRN IV hypotension;  Start 4/13/20 

at 09:35;  Stop 4/13/20 at 15:34;  Status DC


Albumin Human 200 ml @  200 mls/hr 1X PRN  PRN IV Hypotension;  Start 4/13/20 at

09:45;  Stop 4/13/20 at 15:44;  Status DC


Diphenhydramine HCl (Benadryl) 25 mg 1X PRN  PRN IV ITCHING;  Start 4/13/20 at 

09:45;  Stop 4/14/20 at 09:44;  Status DC


Diphenhydramine HCl (Benadryl) 25 mg 1X PRN  PRN IV ITCHING;  Start 4/13/20 at 

09:45;  Stop 4/14/20 at 09:44;  Status DC


Sodium Chloride 1,000 ml @  400 mls/hr Q2H30M PRN IV PATENCY;  Start 4/13/20 at 

09:35;  Stop 4/13/20 at 21:34;  Status DC


Info (PHARMACY MONITORING -- do not chart) 1 each PRN DAILY  PRN MC SEE 

COMMENTS;  Start 4/13/20 at 09:45;  Status Cancel


Sodium Chloride 100 meq/Potassium Phosphate 19 mmol/ Magnesium Sulfate 12 

meq/Calcium Gluconate 15 meq/ Multivitamins 10 ml/Chromium/ Copper/Manganese/ 

Seleni/Zn 0.5 ml/ Insulin Human Regular 40 unit/ Potassium Chloride 20 meq/ 

Total Parenteral Nutrition/Amino Acids/Dextrose/ Fat Emulsion Intravenous 1,400 

ml @  58.333 mls/ hr TPN  CONT IV  Last administered on 4/13/20at 22:02;  Start 

4/13/20 at 22:00;  Stop 4/14/20 at 21:59;  Status DC


Furosemide (Lasix) 40 mg 1X  ONCE IVP  Last administered on 4/13/20at 14:39;  

Start 4/13/20 at 14:30;  Stop 4/13/20 at 14:31;  Status DC


Metronidazole 100 ml @  100 mls/hr Q8HRS IV  Last administered on 4/21/20at 

06:04;  Start 4/14/20 at 10:00;  Stop 4/21/20 at 08:10;  Status DC


Sodium Chloride 1,000 ml @  1,000 mls/hr Q1H PRN IV hypotension;  Start 4/14/20 

at 08:00;  Stop 4/14/20 at 13:59;  Status DC


Albumin Human 200 ml @  200 mls/hr 1X PRN  PRN IV Hypotension;  Start 4/14/20 at

08:00;  Stop 4/14/20 at 13:59;  Status DC


Sodium Chloride 1,000 ml @  400 mls/hr Q2H30M PRN IV PATENCY;  Start 4/14/20 at 

08:00;  Stop 4/14/20 at 19:59;  Status DC


Info (PHARMACY MONITORING -- do not chart) 1 each PRN DAILY  PRN MC SEE 

COMMENTS;  Start 4/14/20 at 11:30;  Status UNV


Info (PHARMACY MONITORING -- do not chart) 1 each PRN DAILY  PRN MC SEE 

COMMENTS;  Start 4/14/20 at 11:30;  Stop 4/16/20 at 12:13;  Status DC


Sodium Chloride 100 meq/Potassium Phosphate 19 mmol/ Magnesium Sulfate 12 

meq/Calcium Gluconate 15 meq/ Multivitamins 10 ml/Chromium/ Copper/Manganese/ 

Seleni/Zn 0.5 ml/ Insulin Human Regular 40 unit/ Potassium Chloride 20 meq/ 

Total Parenteral Nutrition/Amino Acids/Dextrose/ Fat Emulsion Intravenous 1,400 

ml @  58.333 mls/ hr TPN  CONT IV  Last administered on 4/14/20at 21:52;  Start 

4/14/20 at 22:00;  Stop 4/15/20 at 21:59;  Status DC


Sodium Chloride (Normal Saline Flush) 10 ml QSHIFT  PRN IV AFTER MEDS AND BLOOD 

DRAWS;  Start 4/14/20 at 15:00;  Stop 5/12/20 at 11:27;  Status DC


Sodium Chloride (Normal Saline Flush) 10 ml PRN Q5MIN  PRN IV AFTER MEDS AND 

BLOOD DRAWS;  Start 4/14/20 at 15:00;  Stop 8/1/20 at 10:12;  Status DC


Sodium Chloride (Normal Saline Flush) 20 ml PRN Q5MIN  PRN IV AFTER MEDS AND 

BLOOD DRAWS;  Start 4/14/20 at 15:00


Sodium Chloride 100 meq/Potassium Phosphate 19 mmol/ Magnesium Sulfate 12 

meq/Calcium Gluconate 15 meq/ Multivitamins 10 ml/Chromium/ Copper/Manganese/ 

Seleni/Zn 0.5 ml/ Insulin Human Regular 40 unit/ Potassium Chloride 20 meq/ 

Total Parenteral Nutrition/Amino Acids/Dextrose/ Fat Emulsion Intravenous 1,400 

ml @  58.333 mls/ hr TPN  CONT IV  Last administered on 4/15/20at 21:20;  Start 

4/15/20 at 22:00;  Stop 4/16/20 at 21:59;  Status DC


Lidocaine HCl (Buffered Lidocaine 1%) 3 ml STK-MED ONCE .ROUTE ;  Start 4/15/20 

at 13:16;  Stop 4/15/20 at 13:16;  Status DC


Lidocaine HCl (Buffered Lidocaine 1%) 6 ml 1X  ONCE INJ  Last administered on 

4/15/20at 13:45;  Start 4/15/20 at 13:30;  Stop 4/15/20 at 13:31;  Status DC


Albumin Human 100 ml @  100 mls/hr 1X  ONCE IV  Last administered on 4/15/20at 

15:41;  Start 4/15/20 at 15:00;  Stop 4/15/20 at 15:59;  Status DC


Albumin Human 50 ml @ 50 mls/hr 1X  ONCE IV  Last administered on 4/15/20at 

15:00;  Start 4/15/20 at 15:00;  Stop 4/15/20 at 15:59;  Status DC


Info (PHARMACY MONITORING -- do not chart) 1 each PRN DAILY  PRN MC SEE 

COMMENTS;  Start 4/16/20 at 11:30;  Status Cancel


Info (PHARMACY MONITORING -- do not chart) 1 each PRN DAILY  PRN MC SEE 

COMMENTS;  Start 4/16/20 at 11:30;  Status UNV


Sodium Chloride 100 meq/Potassium Phosphate 10 mmol/ Magnesium Sulfate 12 

meq/Calcium Gluconate 15 meq/ Multivitamins 10 ml/Chromium/ Copper/Manganese/ 

Seleni/Zn 0.5 ml/ Insulin Human Regular 35 unit/ Potassium Chloride 20 meq/ 

Total Parenteral Nutrition/Amino Acids/Dextrose/ Fat Emulsion Intravenous 1,400 

ml @  58.333 mls/ hr TPN  CONT IV  Last administered on 4/16/20at 22:10;  Start 

4/16/20 at 22:00;  Stop 4/17/20 at 21:59;  Status DC


Sodium Chloride 100 meq/Potassium Phosphate 5 mmol/ Magnesium Sulfate 12 

meq/Calcium Gluconate 15 meq/ Multivitamins 10 ml/Chromium/ Copper/Manganese/ 

Seleni/Zn 0.5 ml/ Insulin Human Regular 35 unit/ Potassium Chloride 20 meq/ 

Total Parenteral Nutrition/Amino Acids/Dextrose/ Fat Emulsion Intravenous 1,400 

ml @  58.333 mls/ hr TPN  CONT IV  Last administered on 4/17/20at 22:59;  Start 

4/17/20 at 22:00;  Stop 4/18/20 at 21:59;  Status DC


Sodium Chloride 1,000 ml @  1,000 mls/hr Q1H PRN IV hypotension;  Start 4/18/20 

at 08:27;  Stop 4/18/20 at 14:26;  Status DC


Albumin Human 200 ml @  200 mls/hr 1X PRN  PRN IV Hypotension Last administered 

on 4/18/20at 09:18;  Start 4/18/20 at 08:30;  Stop 4/18/20 at 14:29;  Status DC


Sodium Chloride 1,000 ml @  400 mls/hr Q2H30M PRN IV PATENCY;  Start 4/18/20 at 

08:27;  Stop 4/18/20 at 20:26;  Status DC


Info (PHARMACY MONITORING -- do not chart) 1 each PRN DAILY  PRN MC SEE 

COMMENTS;  Start 4/18/20 at 08:30;  Status Cancel


Info (PHARMACY MONITORING -- do not chart) 1 each PRN DAILY  PRN MC SEE 

COMMENTS;  Start 4/18/20 at 08:30;  Stop 4/26/20 at 13:10;  Status DC


Sodium Chloride 100 meq/Potassium Chloride 40 meq/ Magnesium Sulfate 15 

meq/Calcium Gluconate 15 meq/ Multivitamins 10 ml/Chromium/ Copper/Manganese/ 

Seleni/Zn 0.5 ml/ Insulin Human Regular 35 unit/ Total Parenteral 

Nutrition/Amino Acids/Dextrose/ Fat Emulsion Intravenous 1,400 ml @  58.333 mls/

hr TPN  CONT IV  Last administered on 4/18/20at 22:00;  Start 4/18/20 at 22:00; 

Stop 4/19/20 at 21:59;  Status DC


Potassium Chloride/Water 100 ml @  100 mls/hr 1X  ONCE IV  Last administered on 

4/18/20at 17:28;  Start 4/18/20 at 14:45;  Stop 4/18/20 at 15:44;  Status DC


Sodium Chloride 100 meq/Potassium Chloride 40 meq/ Magnesium Sulfate 15 

meq/Calcium Gluconate 15 meq/ Multivitamins 10 ml/Chromium/ Copper/Manganese/ 

Seleni/Zn 0.5 ml/ Insulin Human Regular 35 unit/ Total Parenteral 

Nutrition/Amino Acids/Dextrose/ Fat Emulsion Intravenous 1,400 ml @  58.333 mls/

hr TPN  CONT IV  Last administered on 4/19/20at 22:46;  Start 4/19/20 at 22:00; 

Stop 4/20/20 at 21:59;  Status DC


Sodium Chloride 100 meq/Potassium Chloride 40 meq/ Magnesium Sulfate 20 

meq/Calcium Gluconate 15 meq/ Multivitamins 10 ml/Chromium/ Copper/Manganese/ Se

aram/Zn 0.5 ml/ Insulin Human Regular 35 unit/ Total Parenteral Nutrition/Amino 

Acids/Dextrose/ Fat Emulsion Intravenous 1,400 ml @  58.333 mls/ hr TPN  CONT IV

 Last administered on 4/20/20at 22:31;  Start 4/20/20 at 22:00;  Stop 4/21/20 at

21:59;  Status DC


Fentanyl Citrate (Fentanyl 2ml Vial) 50 mcg PRN Q2HR  PRN IVP PAIN Last 

administered on 4/27/20at 13:32;  Start 4/20/20 at 21:00;  Stop 4/28/20 at 

12:53;  Status DC


Fentanyl Citrate (Fentanyl 2ml Vial) 25 mcg PRN Q2HR  PRN IVP PAIN;  Start 

4/20/20 at 21:00;  Stop 4/28/20 at 12:54;  Status DC


Enoxaparin Sodium (Lovenox 100mg Syringe) 100 mg Q12HR SQ ;  Start 4/21/20 at 

21:00;  Status UNV


Amino Acids/ Glycerin/ Electrolytes 1,000 ml @  75 mls/hr [id number] IV ;  Start 

4/20/20 at 21:15;  Status UNV


Sodium Chloride 1,000 ml @  1,000 mls/hr Q1H PRN IV hypotension;  Start 4/21/20 

at 07:56;  Stop 4/21/20 at 13:55;  Status DC


Albumin Human 200 ml @  200 mls/hr 1X PRN  PRN IV Hypotension Last administered 

on 4/21/20at 08:40;  Start 4/21/20 at 08:00;  Stop 4/21/20 at 13:59;  Status DC


Sodium Chloride 1,000 ml @  400 mls/hr Q2H30M PRN IV PATENCY;  Start 4/21/20 at 

07:56;  Stop 4/21/20 at 19:55;  Status DC


Info (PHARMACY MONITORING -- do not chart) 1 each PRN DAILY  PRN MC SEE 

COMMENTS;  Start 4/21/20 at 08:00;  Status UNV


Info (PHARMACY MONITORING -- do not chart) 1 each PRN DAILY  PRN MC SEE 

COMMENTS;  Start 4/21/20 at 08:00;  Status UNV


Daptomycin 430 mg/ Sodium Chloride 50 ml @  100 mls/hr Q24H IV  Last 

administered on 4/21/20at 12:35;  Start 4/21/20 at 09:00;  Stop 4/21/20 at 

12:49;  Status DC


Sodium Chloride 100 meq/Potassium Chloride 40 meq/ Magnesium Sulfate 20 

meq/Calcium Gluconate 15 meq/ Multivitamins 10 ml/Chromium/ Copper/Manganese/ 

Seleni/Zn 0.5 ml/ Insulin Human Regular 35 unit/ Total Parenteral Nutrition/A

rajesh Acids/Dextrose/ Fat Emulsion Intravenous 1,400 ml @  58.333 mls/ hr TPN  

CONT IV  Last administered on 4/21/20at 21:26;  Start 4/21/20 at 22:00;  Stop 

4/22/20 at 21:59;  Status DC


Daptomycin 430 mg/ Sodium Chloride 50 ml @  100 mls/hr Q48H IV ;  Start 4/23/20 

at 09:00;  Stop 4/22/20 at 11:55;  Status DC


Sodium Chloride 100 meq/Potassium Chloride 40 meq/ Magnesium Sulfate 20 me

q/Calcium Gluconate 15 meq/ Multivitamins 10 ml/Chromium/ Copper/Manganese/ 

Seleni/Zn 0.5 ml/ Insulin Human Regular 35 unit/ Total Parenteral 

Nutrition/Amino Acids/Dextrose/ Fat Emulsion Intravenous 1,400 ml @  58.333 mls/

hr TPN  CONT IV  Last administered on 4/22/20at 22:27;  Start 4/22/20 at 22:00; 

Stop 4/23/20 at 21:59;  Status DC


Daptomycin 430 mg/ Sodium Chloride 50 ml @  100 mls/hr Q24H IV  Last 

administered on 4/24/20at 15:07;  Start 4/22/20 at 13:00;  Stop 4/25/20 at 

13:15;  Status DC


Sodium Chloride 100 meq/Potassium Chloride 40 meq/ Magnesium Sulfate 20 

meq/Calcium Gluconate 10 meq/ Multivitamins 10 ml/Chromium/ Copper/Manganese/ 

Seleni/Zn 0.5 ml/ Insulin Human Regular 35 unit/ Total Parenteral 

Nutrition/Amino Acids/Dextrose/ Fat Emulsion Intravenous 1,400 ml @  58.333 mls/

hr TPN  CONT IV  Last administered on 4/24/20at 00:06;  Start 4/23/20 at 22:00; 

Stop 4/24/20 at 21:59;  Status DC


Alteplase, Recombinant (Cathflo For Central Catheter Clearance) 1 mg 1X  ONCE 

INT CAT  Last administered on 4/24/20at 11:44;  Start 4/24/20 at 10:45;  Stop 

4/24/20 at 10:46;  Status DC


Ondansetron HCl (Zofran) 4 mg PRN Q6HRS  PRN IV NAUSEA/VOMITING;  Start 4/27/20 

at 07:00;  Stop 4/28/20 at 06:59;  Status DC


Fentanyl Citrate (Fentanyl 2ml Vial) 25 mcg PRN Q5MIN  PRN IV MILD PAIN 1-3;  

Start 4/27/20 at 07:00;  Stop 4/28/20 at 06:59;  Status DC


Fentanyl Citrate (Fentanyl 2ml Vial) 50 mcg PRN Q5MIN  PRN IV MODERATE TO SEVERE

PAIN Last administered on 4/27/20at 10:17;  Start 4/27/20 at 07:00;  Stop 

4/28/20 at 06:59;  Status DC


Ringer's Solution 1,000 ml @  30 mls/hr Q24H IV ;  Start 4/27/20 at 07:00;  Stop

4/27/20 at 18:59;  Status DC


Lidocaine HCl (Xylocaine-Mpf 1% 2ml Vial) 2 ml PRN 1X  PRN ID PRIOR TO IV START;

 Start 4/27/20 at 07:00;  Stop 4/28/20 at 06:59;  Status DC


Prochlorperazine Edisylate (Compazine) 5 mg PACU PRN  PRN IV NAUSEA, MRX1;  

Start 4/27/20 at 07:00;  Stop 4/28/20 at 06:59;  Status DC


Sodium Acetate 50 meq/Potassium Acetate 55 meq/ Magnesium Sulfate 20 meq/Calcium

Gluconate 10 meq/ Multivitamins 10 ml/Chromium/ Copper/Manganese/ Seleni/Zn 0.5 

ml/ Insulin Human Regular 35 unit/ Total Parenteral Nutrition/Amino 

Acids/Dextrose/ Fat Emulsion Intravenous 1,400 ml @  58.333 mls/ hr TPN  CONT IV

;  Start 4/24/20 at 22:00;  Stop 4/24/20 at 14:15;  Status DC


Sodium Acetate 50 meq/Potassium Acetate 55 meq/ Magnesium Sulfate 20 meq/Calcium

Gluconate 10 meq/ Multivitamins 10 ml/Chromium/ Copper/Manganese/ Seleni/Zn 0.5 

ml/ Insulin Human Regular 35 unit/ Total Parenteral Nutrition/Amino 

Acids/Dextrose/ Fat Emulsion Intravenous 1,800 ml @  75 mls/hr TPN  CONT IV  

Last administered on 4/24/20at 22:38;  Start 4/24/20 at 22:00;  Stop 4/25/20 at 

21:59;  Status DC


Sodium Chloride 1,000 ml @  1,000 mls/hr Q1H PRN IV hypotension;  Start 4/24/20 

at 15:31;  Stop 4/24/20 at 21:30;  Status DC


Diphenhydramine HCl (Benadryl) 25 mg 1X PRN  PRN IV ITCHING;  Start 4/24/20 at 

15:45;  Stop 4/25/20 at 15:44;  Status DC


Diphenhydramine HCl (Benadryl) 25 mg 1X PRN  PRN IV ITCHING;  Start 4/24/20 at 

15:45;  Stop 4/25/20 at 15:44;  Status DC


Sodium Chloride 1,000 ml @  400 mls/hr Q2H30M PRN IV PATENCY;  Start 4/24/20 at 

15:31;  Stop 4/25/20 at 03:30;  Status DC


Info (PHARMACY MONITORING -- do not chart) 1 each PRN DAILY  PRN MC SEE 

COMMENTS;  Start 4/24/20 at 15:45;  Stop 5/26/20 at 14:14;  Status DC


Sodium Acetate 50 meq/Potassium Acetate 55 meq/ Magnesium Sulfate 20 meq/Calcium

Gluconate 10 meq/ Multivitamins 10 ml/Chromium/ Copper/Manganese/ Seleni/Zn 0.5 

ml/ Insulin Human Regular 35 unit/ Total Parenteral Nutrition/Amino 

Acids/Dextrose/ Fat Emulsion Intravenous 1,800 ml @  75 mls/hr TPN  CONT IV  

Last administered on 4/25/20at 22:03;  Start 4/25/20 at 22:00;  Stop 4/26/20 at 

21:59;  Status DC


Daptomycin 430 mg/ Sodium Chloride 50 ml @  100 mls/hr Q24H IV  Last 

administered on 4/30/20at 13:00;  Start 4/25/20 at 13:00;  Stop 4/30/20 at 

20:58;  Status DC


Heparin Sodium (Porcine) 1000 unit/Sodium Chloride 1,001 ml @  1,001 mls/hr 1X  

ONCE IRR ;  Start 4/27/20 at 06:00;  Stop 4/27/20 at 06:59;  Status DC


Potassium Acetate 55 meq/Magnesium Sulfate 20 meq/ Calcium Gluconate 10 meq/ 

Multivitamins 10 ml/Chromium/ Copper/Manganese/ Seleni/Zn 0.5 ml/ Insulin Human 

Regular 35 unit/ Total Parenteral Nutrition/Amino Acids/Dextrose/ Fat Emulsion 

Intravenous 1,920 ml @  80 mls/hr TPN  CONT IV  Last administered on 4/26/20at 

22:10;  Start 4/26/20 at 22:00;  Stop 4/27/20 at 21:59;  Status DC


Dexamethasone Sodium Phosphate (Decadron) 4 mg STK-MED ONCE .ROUTE ;  Start 

4/27/20 at 10:56;  Stop 4/27/20 at 10:57;  Status DC


Ondansetron HCl (Zofran) 4 mg STK-MED ONCE .ROUTE ;  Start 4/27/20 at 10:56;  

Stop 4/27/20 at 10:57;  Status DC


Rocuronium Bromide (Zemuron) 50 mg STK-MED ONCE .ROUTE ;  Start 4/27/20 at 

10:56;  Stop 4/27/20 at 10:57;  Status DC


Fentanyl Citrate (Fentanyl 2ml Vial) 100 mcg STK-MED ONCE .ROUTE ;  Start 

4/27/20 at 10:56;  Stop 4/27/20 at 10:57;  Status DC


Bupivacaine HCl/ Epinephrine Bitart (Sensorcain-Epi 0.5%-1:720324 Mpf) 30 ml 

STK-MED ONCE .ROUTE  Last administered on 4/27/20at 12:01;  Start 4/27/20 at 

10:58;  Stop 4/27/20 at 10:58;  Status DC


Cellulose (Surgicel Hemostat 2x14) 1 each STK-MED ONCE .ROUTE ;  Start 4/27/20 

at 10:58;  Stop 4/27/20 at 10:59;  Status DC


Iohexol (Omnipaque 300 Mg/ml) 50 ml STK-MED ONCE .ROUTE ;  Start 4/27/20 at 

10:58;  Stop 4/27/20 at 10:59;  Status DC


Cellulose (Surgicel Hemostat 4x8) 1 each STK-MED ONCE .ROUTE ;  Start 4/27/20 at

10:58;  Stop 4/27/20 at 10:59;  Status DC


Bisacodyl (Dulcolax Supp) 10 mg STK-MED ONCE .ROUTE ;  Start 4/27/20 at 10:59;  

Stop 4/27/20 at 10:59;  Status DC


Heparin Sodium (Porcine) 1000 unit/Sodium Chloride 1,001 ml @  1,001 mls/hr 1X  

ONCE IRR ;  Start 4/27/20 at 12:00;  Stop 4/27/20 at 12:59;  Status DC


Propofol 20 ml @ As Directed STK-MED ONCE IV ;  Start 4/27/20 at 11:05;  Stop 

4/27/20 at 11:05;  Status DC


Sevoflurane (Ultane) 90 ml STK-MED ONCE IH ;  Start 4/27/20 at 11:05;  Stop 

4/27/20 at 11:05;  Status DC


Sevoflurane (Ultane) 60 ml STK-MED ONCE IH ;  Start 4/27/20 at 12:26;  Stop 

4/27/20 at 12:27;  Status DC


Propofol 20 ml @ As Directed STK-MED ONCE IV ;  Start 4/27/20 at 12:26;  Stop 

4/27/20 at 12:27;  Status DC


Phenylephrine HCl (PHENYLEPHRINE in 0.9% NACL PF) 1 mg STK-MED ONCE IV ;  Start 

4/27/20 at 12:34;  Stop 4/27/20 at 12:34;  Status DC


Heparin Sodium (Porcine) (Heparin Sodium) 5,000 unit Q12HR SQ  Last administered

on 5/6/20at 20:57;  Start 4/27/20 at 21:00;  Stop 5/7/20 at 09:59;  Status DC


Sodium Chloride (Normal Saline Flush) 3 ml QSHIFT  PRN IV AFTER MEDS AND BLOOD 

DRAWS;  Start 4/27/20 at 13:45;  Status Cancel


Naloxone HCl (Narcan) 0.4 mg PRN Q2MIN  PRN IV SEE INSTRUCTIONS Last 

administered on 6/6/20at 15:15;  Start 4/27/20 at 13:45;  Stop 7/1/20 at 16:00; 

Status DC


Sodium Chloride 1,000 ml @  25 mls/hr Q24H IV  Last administered on 5/26/20at 

13:37;  Start 4/27/20 at 13:37;  Stop 5/29/20 at 13:09;  Status DC


Naloxone HCl (Narcan) 0.4 mg PRN Q2MIN  PRN IV SEE INSTRUCTIONS;  Start 4/27/20 

at 14:30;  Status UNV


Sodium Chloride 1,000 ml @  25 mls/hr Q24H IV ;  Start 4/27/20 at 14:30;  Status

UNV


Hydromorphone HCl 30 ml @ 0 mls/hr CONT PRN  PRN IV PER PROTOCOL Last 

administered on 5/2/20at 16:08;  Start 4/27/20 at 14:30;  Stop 5/4/20 at 08:55; 

Status DC


Potassium Acetate 55 meq/Magnesium Sulfate 20 meq/ Calcium Gluconate 10 meq/ 

Multivitamins 10 ml/Chromium/ Copper/Manganese/ Seleni/Zn 0.5 ml/ Insulin Human 

Regular 35 unit/ Total Parenteral Nutrition/Amino Acids/Dextrose/ Fat Emulsion 

Intravenous 1,920 ml @  80 mls/hr TPN  CONT IV  Last administered on 4/27/20at 

22:01;  Start 4/27/20 at 22:00;  Stop 4/28/20 at 21:59;  Status DC


Bumetanide (Bumex) 2 mg [id number] IV  Last administered on 5/1/20at 13:50;  Start 

4/28/20 at 14:00;  Stop 5/2/20 at 14:10;  Status DC


Meropenem 1 gm/ Sodium Chloride 100 ml @  200 mls/hr Q8HRS IV  Last administered

on 5/22/20at 05:53;  Start 4/28/20 at 14:00;  Stop 5/22/20 at 09:31;  Status DC


Potassium Acetate 55 meq/Magnesium Sulfate 20 meq/ Calcium Gluconate 10 meq/ 

Multivitamins 10 ml/Chromium/ Copper/Manganese/ Seleni/Zn 0.5 ml/ Insulin Human 

Regular 35 unit/ Total Parenteral Nutrition/Amino Acids/Dextrose/ Fat Emulsion 

Intravenous 1,920 ml @  80 mls/hr TPN  CONT IV  Last administered on 4/28/20at 

22:02;  Start 4/28/20 at 22:00;  Stop 4/29/20 at 21:59;  Status DC


Hydromorphone HCl (Dilaudid Standard PCA) 12 mg STK-MED ONCE IV ;  Start 4/27/20

at 14:35;  Stop 4/28/20 at 13:53;  Status DC


Artificial Tears (Artificial Tears) 1 drop PRN Q15MIN  PRN OU DRY EYE Last 

administered on 6/23/20at 21:17;  Start 4/29/20 at 05:30


Hydromorphone HCl (Dilaudid Standard PCA) 12 mg STK-MED ONCE IV ;  Start 4/28/20

at 12:05;  Stop 4/29/20 at 09:15;  Status DC


Potassium Acetate 65 meq/Magnesium Sulfate 20 meq/ Calcium Gluconate 10 meq/ 

Multivitamins 10 ml/Chromium/ Copper/Manganese/ Seleni/Zn 0.5 ml/ Insulin Human 

Regular 30 unit/ Total Parenteral Nutrition/Amino Acids/Dextrose/ Fat Emulsion 

Intravenous 1,920 ml @  80 mls/hr TPN  CONT IV  Last administered on 4/29/20at 

22:22;  Start 4/29/20 at 22:00;  Stop 4/30/20 at 21:59;  Status DC


Cyclobenzaprine HCl (Flexeril) 10 mg PRN Q6HRS  PRN PO MUSCLE SPASMS Last 

administered on 8/8/20at 20:50;  Start 4/30/20 at 10:45


Potassium Acetate 55 meq/Magnesium Sulfate 20 meq/ Calcium Gluconate 10 meq/ 

Multivitamins 10 ml/Chromium/ Copper/Manganese/ Seleni/Zn 0.5 ml/ Insulin Human 

Regular 30 unit/ Total Parenteral Nutrition/Amino Acids/Dextrose/ Fat Emulsion 

Intravenous 1,920 ml @  80 mls/hr TPN  CONT IV  Last administered on 5/1/20at 

01:00;  Start 4/30/20 at 22:00;  Stop 5/1/20 at 21:59;  Status DC


Magnesium Sulfate 50 ml @ 25 mls/hr 1X  ONCE IV  Last administered on 4/30/20at 

17:18;  Start 4/30/20 at 12:45;  Stop 4/30/20 at 14:44;  Status DC


Potassium Chloride/Water 100 ml @  100 mls/hr 1X  ONCE IV  Last administered on 

5/1/20at 11:27;  Start 5/1/20 at 12:00;  Stop 5/1/20 at 12:59;  Status DC


Hydromorphone HCl (Dilaudid Standard PCA) 12 mg STK-MED ONCE IV ;  Start 4/29/20

at 10:50;  Stop 5/1/20 at 11:02;  Status DC


Hydromorphone HCl (Dilaudid Standard PCA) 12 mg STK-MED ONCE IV ;  Start 4/30/20

at 13:47;  Stop 5/1/20 at 11:03;  Status DC


Potassium Acetate 30 meq/Magnesium Sulfate 20 meq/ Calcium Gluconate 10 meq/ 

Multivitamins 10 ml/Chromium/ Copper/Manganese/ Seleni/Zn 0.5 ml/ Insulin Human 

Regular 30 unit/ Potassium Chloride 30 meq/ Total Parenteral Nutrition/Amino 

Acids/Dextrose/ Fat Emulsion Intravenous 1,920 ml @  80 mls/hr TPN  CONT IV  

Last administered on 5/1/20at 22:34;  Start 5/1/20 at 22:00;  Stop 5/2/20 at 

21:59;  Status DC


Potassium Chloride/Water 100 ml @  100 mls/hr Q1H IV  Last administered on 

5/2/20at 13:05;  Start 5/2/20 at 07:00;  Stop 5/2/20 at 10:59;  Status DC


Magnesium Sulfate 50 ml @ 25 mls/hr 1X  ONCE IV  Last administered on 5/2/20at 

10:34;  Start 5/2/20 at 10:30;  Stop 5/2/20 at 12:29;  Status DC


Potassium Chloride 75 meq/ Magnesium Sulfate 20 meq/Calcium Gluconate 10 meq/ 

Multivitamins 10 ml/Chromium/ Copper/Manganese/ Seleni/Zn 0.5 ml/ Insulin Human 

Regular 30 unit/ Total Parenteral Nutrition/Amino Acids/Dextrose/ Fat Emulsion 

Intravenous 1,920 ml @  80 mls/hr TPN  CONT IV  Last administered on 5/2/20at 

21:51;  Start 5/2/20 at 22:00;  Stop 5/3/20 at 22:00;  Status DC


Potassium Chloride 75 meq/ Magnesium Sulfate 20 meq/Calcium Gluconate 10 meq/ 

Multivitamins 10 ml/Chromium/ Copper/Manganese/ Seleni/Zn 0.5 ml/ Insulin Human 

Regular 25 unit/ Total Parenteral Nutrition/Amino Acids/Dextrose/ Fat Emulsion 

Intravenous 1,920 ml @  80 mls/hr TPN  CONT IV  Last administered on 5/3/20at 

22:04;  Start 5/3/20 at 22:00;  Stop 5/4/20 at 21:59;  Status DC


Hydromorphone HCl (Dilaudid) 0.4 mg PRN Q4HRS  PRN IVP PAIN Last administered on

5/4/20at 10:57;  Start 5/4/20 at 09:00;  Stop 5/4/20 at 18:59;  Status DC


Micafungin Sodium 100 mg/Dextrose 100 ml @  100 mls/hr Q24H IV  Last 

administered on 5/26/20at 12:17;  Start 5/4/20 at 11:00;  Stop 5/27/20 at 09:59;

 Status DC


Daptomycin 485 mg/ Sodium Chloride 50 ml @  100 mls/hr Q24H IV  Last 

administered on 5/11/20at 13:10;  Start 5/4/20 at 11:00;  Stop 5/12/20 at 07:44;

 Status DC


Potassium Chloride 75 meq/ Magnesium Sulfate 15 meq/Calcium Gluconate 8 meq/ 

Multivitamins 10 ml/Chromium/ Copper/Manganese/ Seleni/Zn 0.5 ml/ Insulin Human 

Regular 25 unit/ Total Parenteral Nutrition/Amino Acids/Dextrose/ Fat Emulsion 

Intravenous 1,920 ml @  80 mls/hr TPN  CONT IV  Last administered on 5/4/20at 

23:08;  Start 5/4/20 at 22:00;  Stop 5/5/20 at 21:59;  Status DC


Haloperidol Lactate (Haldol Inj) 3 mg 1X  ONCE IVP  Last administered on 

5/4/20at 14:37;  Start 5/4/20 at 14:30;  Stop 5/4/20 at 14:31;  Status DC


Hydromorphone HCl (Dilaudid) 1 mg PRN Q4HRS  PRN IVP PAIN Last administered on 

5/18/20at 06:25;  Start 5/4/20 at 19:00;  Stop 5/18/20 at 17:10;  Status DC


Potassium Chloride 75 meq/ Magnesium Sulfate 15 meq/Calcium Gluconate 8 meq/ 

Multivitamins 10 ml/Chromium/ Copper/Manganese/ Seleni/Zn 0.5 ml/ Insulin Human 

Regular 20 unit/ Total Parenteral Nutrition/Amino Acids/Dextrose/ Fat Emulsion 

Intravenous 1,920 ml @  80 mls/hr TPN  CONT IV  Last administered on 5/5/20at 22

:10;  Start 5/5/20 at 22:00;  Stop 5/6/20 at 21:59;  Status DC


Lidocaine HCl (Buffered Lidocaine 1%) 3 ml STK-MED ONCE .ROUTE ;  Start 5/6/20 

at 11:31;  Stop 5/6/20 at 11:31;  Status DC


Lidocaine HCl (Buffered Lidocaine 1%) 3 ml STK-MED ONCE .ROUTE ;  Start 5/6/20 

at 12:28;  Stop 5/6/20 at 12:29;  Status DC


Lidocaine HCl (Buffered Lidocaine 1%) 6 ml 1X  ONCE INJ  Last administered on 

5/6/20at 12:53;  Start 5/6/20 at 12:45;  Stop 5/6/20 at 12:46;  Status DC


Potassium Chloride 75 meq/ Magnesium Sulfate 15 meq/Calcium Gluconate 8 meq/ 

Multivitamins 10 ml/Chromium/ Copper/Manganese/ Seleni/Zn 0.5 ml/ Insulin Human 

Regular 20 unit/ Total Parenteral Nutrition/Amino Acids/Dextrose/ Fat Emulsion 

Intravenous 1,920 ml @  80 mls/hr TPN  CONT IV  Last administered on 5/6/20at 

22:00;  Start 5/6/20 at 22:00;  Stop 5/7/20 at 21:59;  Status DC


Potassium Chloride 75 meq/ Magnesium Sulfate 15 meq/Calcium Gluconate 8 meq/ 

Multivitamins 10 ml/Chromium/ Copper/Manganese/ Seleni/Zn 0.5 ml/ Insulin Human 

Regular 15 unit/ Total Parenteral Nutrition/Amino Acids/Dextrose/ Fat Emulsion 

Intravenous 1,920 ml @  80 mls/hr TPN  CONT IV  Last administered on 5/7/20at 

22:28;  Start 5/7/20 at 22:00;  Stop 5/8/20 at 21:59;  Status DC


Vecuronium Bromide (Norcuron Bolus) 6 mg PRN Q6HRS  PRN IV VENT ASYNCHRONY;  

Start 5/7/20 at 19:15;  Stop 5/7/20 at 19:35;  Status DC


Bumetanide (Bumex) 2 mg 1X  ONCE IV  Last administered on 5/7/20at 22:09;  Start

5/7/20 at 19:45;  Stop 5/7/20 at 19:46;  Status DC


Lidocaine HCl (Buffered Lidocaine 1%) 3 ml STK-MED ONCE .ROUTE ;  Start 5/8/20 

at 07:59;  Stop 5/8/20 at 07:59;  Status DC


Midazolam HCl (Versed) 5 mg STK-MED ONCE .ROUTE ;  Start 5/8/20 at 08:36;  Stop 

5/8/20 at 08:36;  Status DC


Fentanyl Citrate (Fentanyl 5ml Vial) 250 mcg STK-MED ONCE .ROUTE ;  Start 5/8/20

at 08:36;  Stop 5/8/20 at 08:37;  Status DC


Lidocaine HCl (Buffered Lidocaine 1%) 3 ml 1X  ONCE IJ  Last administered on 5/ 8/20at 09:30;  Start 5/8/20 at 09:15;  Stop 5/8/20 at 09:16;  Status DC


Midazolam HCl (Versed) 5 mg 1X  ONCE IV  Last administered on 5/8/20at 09:30;  

Start 5/8/20 at 09:15;  Stop 5/8/20 at 09:16;  Status DC


Fentanyl Citrate (Fentanyl 5ml Vial) 250 mcg 1X  ONCE IV  Last administered on 

5/8/20at 09:30;  Start 5/8/20 at 09:15;  Stop 5/8/20 at 09:16;  Status DC


Bumetanide (Bumex) 2 mg DAILY IV  Last administered on 5/18/20at 08:07;  Start 

5/8/20 at 10:00;  Stop 5/18/20 at 17:15;  Status DC


Potassium Chloride 75 meq/ Magnesium Sulfate 15 meq/ Multivitamins 10 

ml/Chromium/ Copper/Manganese/ Seleni/Zn 0.5 ml/ Insulin Human Regular 15 unit/ 

Total Parenteral Nutrition/Amino Acids/Dextrose/ Fat Emulsion Intravenous 1,920 

ml @  80 mls/hr TPN  CONT IV  Last administered on 5/8/20at 21:59;  Start 5/8/20

at 22:00;  Stop 5/9/20 at 21:59;  Status DC


Metoclopramide HCl (Reglan Vial) 10 mg PRN Q3HRS  PRN IVP NAUSEA/VOMITING-3rd 

choice Last administered on 5/14/20at 04:25;  Start 5/9/20 at 16:45


Potassium Chloride 75 meq/ Magnesium Sulfate 15 meq/ Multivitamins 10 

ml/Chromium/ Copper/Manganese/ Seleni/Zn 0.5 ml/ Insulin Human Regular 15 unit/ 

Total Parenteral Nutrition/Amino Acids/Dextrose/ Fat Emulsion Intravenous 1,920 

ml @  80 mls/hr TPN  CONT IV  Last administered on 5/9/20at 22:41;  Start 5/9/20

at 22:00;  Stop 5/10/20 at 21:59;  Status DC


Magnesium Sulfate 50 ml @ 25 mls/hr 1X  ONCE IV  Last administered on 5/10/20at 

10:44;  Start 5/10/20 at 09:00;  Stop 5/10/20 at 10:59;  Status DC


Potassium Chloride/Water 100 ml @  100 mls/hr 1X  ONCE IV  Last administered on 

5/10/20at 09:37;  Start 5/10/20 at 09:00;  Stop 5/10/20 at 09:59;  Status DC


Duloxetine HCl (Cymbalta) 30 mg DAILY PO  Last administered on 5/11/20at 09:48; 

Start 5/10/20 at 14:00;  Stop 5/13/20 at 10:25;  Status DC


Potassium Chloride 80 meq/ Magnesium Sulfate 20 meq/ Multivitamins 10 

ml/Chromium/ Copper/Manganese/ Seleni/Zn 0.5 ml/ Insulin Human Regular 15 unit/ 

Total Parenteral Nutrition/Amino Acids/Dextrose/ Fat Emulsion Intravenous 1,920 

ml @  80 mls/hr TPN  CONT IV  Last administered on 5/10/20at 21:42;  Start 

5/10/20 at 22:00;  Stop 5/11/20 at 21:59;  Status DC


Potassium Chloride 80 meq/ Magnesium Sulfate 20 meq/ Multivitamins 10 

ml/Chromium/ Copper/Manganese/ Seleni/Zn 0.5 ml/ Insulin Human Regular 15 unit/ 

Total Parenteral Nutrition/Amino Acids/Dextrose/ Fat Emulsion Intravenous 1,920 

ml @  80 mls/hr TPN  CONT IV  Last administered on 5/11/20at 22:20;  Start 

5/11/20 at 22:00;  Stop 5/12/20 at 21:59;  Status DC


Lidocaine HCl (Buffered Lidocaine 1%) 3 ml STK-MED ONCE .ROUTE ;  Start 5/12/20 

at 09:54;  Stop 5/12/20 at 09:55;  Status DC


Hydromorphone HCl (Dilaudid Standard PCA) 12 mg STK-MED ONCE IV ;  Start 5/1/20 

at 15:50;  Stop 5/12/20 at 11:24;  Status DC


Potassium Chloride 80 meq/ Magnesium Sulfate 20 meq/ Multivitamins 10 

ml/Chromium/ Copper/Manganese/ Seleni/Zn 0.5 ml/ Insulin Human Regular 15 unit/ 

Total Parenteral Nutrition/Amino Acids/Dextrose/ Fat Emulsion Intravenous 1,920 

ml @  80 mls/hr TPN  CONT IV  Last administered on 5/12/20at 21:40;  Start 

5/12/20 at 22:00;  Stop 5/13/20 at 21:59;  Status DC


Lidocaine HCl (Buffered Lidocaine 1%) 6 ml 1X  ONCE INJ  Last administered on 

5/12/20at 14:15;  Start 5/12/20 at 14:15;  Stop 5/12/20 at 14:16;  Status DC


Potassium Chloride 80 meq/ Magnesium Sulfate 20 meq/ Multivitamins 10 

ml/Chromium/ Copper/Manganese/ Seleni/Zn 1 ml/ Insulin Human Regular 15 unit/ 

Total Parenteral Nutrition/Amino Acids/Dextrose/ Fat Emulsion Intravenous 1,920 

ml @  80 mls/hr TPN  CONT IV  Last administered on 5/13/20at 22:04;  Start 

5/13/20 at 22:00;  Stop 5/14/20 at 21:59;  Status DC


Potassium Chloride/Water 100 ml @  100 mls/hr 1X  ONCE IV  Last administered on 

5/14/20at 11:34;  Start 5/14/20 at 11:00;  Stop 5/14/20 at 11:59;  Status DC


Potassium Chloride 90 meq/ Magnesium Sulfate 20 meq/ Multivitamins 10 

ml/Chromium/ Copper/Manganese/ Seleni/Zn 1 ml/ Insulin Human Regular 15 unit/ 

Total Parenteral Nutrition/Amino Acids/Dextrose/ Fat Emulsion Intravenous 1,920 

ml @  80 mls/hr TPN  CONT IV  Last administered on 5/14/20at 22:57;  Start 

5/14/20 at 22:00;  Stop 5/15/20 at 21:59;  Status DC


Potassium Chloride 90 meq/ Magnesium Sulfate 20 meq/ Multivitamins 10 

ml/Chromium/ Copper/Manganese/ Seleni/Zn 1 ml/ Insulin Human Regular 15 unit/ 

Total Parenteral Nutrition/Amino Acids/Dextrose/ Fat Emulsion Intravenous 1,920 

ml @  80 mls/hr TPN  CONT IV  Last administered on 5/15/20at 22:48;  Start 

5/15/20 at 22:00;  Stop 5/16/20 at 21:59;  Status DC


Potassium Chloride 90 meq/ Magnesium Sulfate 20 meq/ Multivitamins 10 

ml/Chromium/ Copper/Manganese/ Seleni/Zn 1 ml/ Insulin Human Regular 15 unit/ 

Total Parenteral Nutrition/Amino Acids/Dextrose/ Fat Emulsion Intravenous 1,890 

ml @  78.75 mls/ hr TPN  CONT IV  Last administered on 5/16/20at 22:15;  Start 

5/16/20 at 22:00;  Stop 5/17/20 at 21:59;  Status DC


Linezolid/Dextrose 300 ml @  300 mls/hr Q12HR IV  Last administered on 5/19/20at

21:08;  Start 5/17/20 at 09:00;  Stop 5/20/20 at 08:11;  Status DC


Daptomycin 450 mg/ Sodium Chloride 50 ml @  100 mls/hr Q24H IV  Last 

administered on 5/20/20at 09:25;  Start 5/17/20 at 09:00;  Stop 5/21/20 at 

08:30;  Status DC


Potassium Chloride 90 meq/ Magnesium Sulfate 20 meq/ Multivitamins 10 

ml/Chromium/ Copper/Manganese/ Seleni/Zn 1 ml/ Insulin Human Regular 15 unit/ 

Total Parenteral Nutrition/Amino Acids/Dextrose/ Fat Emulsion Intravenous 1,890 

ml @  78.75 mls/ hr TPN  CONT IV  Last administered on 5/17/20at 21:34;  Start 

5/17/20 at 22:00;  Stop 5/18/20 at 21:59;  Status DC


Lorazepam (Ativan Inj) 2 mg STK-MED ONCE .ROUTE ;  Start 5/17/20 at 14:58;  Stop

5/17/20 at 14:58;  Status DC


Metoprolol Tartrate (Lopressor Vial) 5 mg 1X  ONCE IVP  Last administered on 

5/17/20at 15:31;  Start 5/17/20 at 15:15;  Stop 5/17/20 at 15:16;  Status DC


Lorazepam (Ativan Inj) 2 mg 1X  ONCE IVP  Last administered on 5/17/20at 15:30; 

Start 5/17/20 at 15:15;  Stop 5/17/20 at 15:16;  Status DC


Enoxaparin Sodium (Lovenox 40mg Syringe) 40 mg Q24H SQ  Last administered on 

6/5/20at 17:44;  Start 5/17/20 at 17:00;  Stop 6/7/20 at 06:50;  Status DC


Lorazepam (Ativan Inj) 1 mg PRN Q4HRS  PRN IVP ANXIETY / AGITATION MILD-MOD Last

administered on 5/31/20at 15:55;  Start 5/17/20 at 19:15;  Stop 6/2/20 at 11:45;

 Status DC


Lorazepam (Ativan Inj) 2 mg PRN Q4HRS  PRN IVP ANXIETY / AGITATION SEVERE Last 

administered on 6/1/20at 07:55;  Start 5/17/20 at 19:15;  Stop 6/2/20 at 11:45; 

Status DC


Fentanyl Citrate (Fentanyl 2ml Vial) 50 mcg PRN Q4HRS  PRN IVP SEVERE PAIN Last 

administered on 6/13/20at 05:15;  Start 5/18/20 at 13:15;  Stop 6/14/20 at 

09:29;  Status DC


Fentanyl Citrate (Fentanyl 2ml Vial) 25 mcg PRN Q4HRS  PRN IVP MODERATE PAIN 

Last administered on 6/13/20at 00:27;  Start 5/18/20 at 13:15;  Stop 6/14/20 at 

09:30;  Status DC


Potassium Chloride 90 meq/ Magnesium Sulfate 20 meq/ Multivitamins 10 

ml/Chromium/ Copper/Manganese/ Seleni/Zn 1 ml/ Insulin Human Regular 15 unit/ 

Total Parenteral Nutrition/Amino Acids/Dextrose/ Fat Emulsion Intravenous 1,890 

ml @  78.75 mls/ hr TPN  CONT IV  Last administered on 5/18/20at 22:18;  Start 

5/18/20 at 22:00;  Stop 5/19/20 at 21:59;  Status DC


Furosemide (Lasix) 40 mg 1X  ONCE IVP  Last administered on 5/18/20at 21:51;  

Start 5/18/20 at 21:45;  Stop 5/18/20 at 21:48;  Status DC


Albumin Human 100 ml @  100 mls/hr 1X PRN  PRN IV SEE COMMENTS;  Start 5/19/20 

at 01:30;  Stop 8/3/20 at 09:52;  Status DC


Furosemide (Lasix) 40 mg [id number] IVP  Last administered on 6/3/20at 08:04;  Start 

5/19/20 at 14:00;  Stop 6/3/20 at 13:07;  Status DC


Potassium Chloride 90 meq/ Magnesium Sulfate 20 meq/ Multivitamins 10 

ml/Chromium/ Copper/Manganese/ Seleni/Zn 1 ml/ Insulin Human Regular 15 unit/ 

Total Parenteral Nutrition/Amino Acids/Dextrose/ Fat Emulsion Intravenous 1,800 

ml @  75 mls/hr TPN  CONT IV  Last administered on 5/19/20at 22:31;  Start 

5/19/20 at 22:00;  Stop 5/20/20 at 21:59;  Status DC


Potassium Chloride 90 meq/ Magnesium Sulfate 20 meq/ Multivitamins 10 

ml/Chromium/ Copper/Manganese/ Seleni/Zn 1 ml/ Insulin Human Regular 15 unit/ 

Total Parenteral Nutrition/Amino Acids/Dextrose/ Fat Emulsion Intravenous 1,800 

ml @  75 mls/hr TPN  CONT IV  Last administered on 5/20/20at 22:28;  Start 

5/20/20 at 22:00;  Stop 5/21/20 at 21:59;  Status DC


Potassium Chloride 110 meq/ Magnesium Sulfate 20 meq/ Multivitamins 10 

ml/Chromium/ Copper/Manganese/ Seleni/Zn 1 ml/ Insulin Human Regular 15 unit/ 

Total Parenteral Nutrition/Amino Acids/Dextrose/ Fat Emulsion Intravenous 1,800 

ml @  75 mls/hr TPN  CONT IV  Last administered on 5/21/20at 22:01;  Start 

5/21/20 at 22:00;  Stop 5/22/20 at 21:59;  Status DC


Saliva Substitute (Biotene Moisturizing Mouth) 2 spray PRN Q15MIN  PRN PO DRY 

MOUTH;  Start 5/21/20 at 11:00


Potassium Chloride 110 meq/ Magnesium Sulfate 20 meq/ Multivitamins 10 

ml/Chromium/ Copper/Manganese/ Seleni/Zn 1 ml/ Insulin Human Regular 15 unit/ 

Total Parenteral Nutrition/Amino Acids/Dextrose/ Fat Emulsion Intravenous 1,800 

ml @  75 mls/hr TPN  CONT IV  Last administered on 5/22/20at 22:21;  Start 

5/22/20 at 22:00;  Stop 5/23/20 at 21:59;  Status DC


Potassium Chloride 110 meq/ Magnesium Sulfate 20 meq/ Multivitamins 10 

ml/Chromium/ Copper/Manganese/ Seleni/Zn 1 ml/ Insulin Human Regular 15 unit/ 

Total Parenteral Nutrition/Amino Acids/Dextrose/ Fat Emulsion Intravenous 1,800 

ml @  75 mls/hr TPN  CONT IV  Last administered on 5/23/20at 22:04;  Start 

5/23/20 at 22:00;  Stop 5/24/20 at 21:59;  Status DC


Potassium Chloride 110 meq/ Magnesium Sulfate 20 meq/ Multivitamins 10 

ml/Chromium/ Copper/Manganese/ Seleni/Zn 1 ml/ Insulin Human Regular 15 unit/ 

Total Parenteral Nutrition/Amino Acids/Dextrose/ Fat Emulsion Intravenous 1,800 

ml @  75 mls/hr TPN  CONT IV  Last administered on 5/24/20at 22:48;  Start 

5/24/20 at 22:00;  Stop 5/25/20 at 21:59;  Status DC


Potassium Chloride 70 meq/ Magnesium Sulfate 20 meq/ Multivitamins 10 

ml/Chromium/ Copper/Manganese/ Seleni/Zn 1 ml/ Insulin Human Regular 15 unit/ 

Total Parenteral Nutrition/Amino Acids/Dextrose/ Fat Emulsion Intravenous 1,800 

ml @  75 mls/hr TPN  CONT IV  Last administered on 5/25/20at 21:39;  Start 

5/25/20 at 22:00;  Stop 5/26/20 at 21:59;  Status DC


Meropenem 500 mg/ Sodium Chloride 50 ml @  100 mls/hr Q6HRS IV  Last 

administered on 5/27/20at 06:02;  Start 5/25/20 at 18:00;  Stop 5/27/20 at 

09:59;  Status DC


Barium Sulfate (Varibar Thin Liquid Apple) 148 gm 1X  ONCE PO ;  Start 5/26/20 

at 11:45;  Stop 5/26/20 at 11:49;  Status DC


Potassium Chloride 70 meq/ Magnesium Sulfate 20 meq/ Multivitamins 10 ml/Chromi

um/ Copper/Manganese/ Seleni/Zn 1 ml/ Insulin Human Regular 15 unit/ Total 

Parenteral Nutrition/Amino Acids/Dextrose/ Fat Emulsion Intravenous 1,800 ml @  

75 mls/hr TPN  CONT IV  Last administered on 5/26/20at 22:27;  Start 5/26/20 at 

22:00;  Stop 5/27/20 at 21:59;  Status DC


Piperacillin Sod/ Tazobactam Sod 3.375 gm/Sodium Chloride 50 ml @  100 mls/hr 

Q6HRS IV  Last administered on 6/4/20at 06:10;  Start 5/27/20 at 12:00;  Stop 

6/4/20 at 07:26;  Status DC


Potassium Chloride 70 meq/ Magnesium Sulfate 20 meq/ Multivitamins 10 

ml/Chromium/ Copper/Manganese/ Seleni/Zn 1 ml/ Insulin Human Regular 15 unit/ 

Total Parenteral Nutrition/Amino Acids/Dextrose/ Fat Emulsion Intravenous 1,800 

ml @  75 mls/hr TPN  CONT IV  Last administered on 5/27/20at 22:03;  Start 5/27 /20 at 22:00;  Stop 5/28/20 at 21:59;  Status DC


Potassium Chloride 70 meq/ Magnesium Sulfate 20 meq/ Multivitamins 10 ml/Chromi

um/ Copper/Manganese/ Seleni/Zn 1 ml/ Insulin Human Regular 15 unit/ Total 

Parenteral Nutrition/Amino Acids/Dextrose/ Fat Emulsion Intravenous 1,800 ml @  

75 mls/hr TPN  CONT IV  Last administered on 5/28/20at 22:33;  Start 5/28/20 at 

22:00;  Stop 5/29/20 at 21:59;  Status DC


Potassium Chloride 70 meq/ Magnesium Sulfate 20 meq/ Multivitamins 10 

ml/Chromium/ Copper/Manganese/ Seleni/Zn 1 ml/ Insulin Human Regular 15 unit/ 

Total Parenteral Nutrition/Amino Acids/Dextrose/ Fat Emulsion Intravenous 1,800 

ml @  75 mls/hr TPN  CONT IV  Last administered on 5/29/20at 23:13;  Start 

5/29/20 at 22:00;  Stop 5/30/20 at 21:59;  Status DC


Potassium Chloride 80 meq/ Magnesium Sulfate 20 meq/ Multivitamins 10 

ml/Chromium/ Copper/Manganese/ Seleni/Zn 1 ml/ Insulin Human Regular 15 unit/ 

Total Parenteral Nutrition/Amino Acids/Dextrose/ Fat Emulsion Intravenous 1,800 

ml @  75 mls/hr TPN  CONT IV  Last administered on 5/30/20at 22:30;  Start 

5/30/20 at 22:00;  Stop 5/31/20 at 21:59;  Status DC


Potassium Chloride 80 meq/ Magnesium Sulfate 20 meq/ Multivitamins 10 

ml/Chromium/ Copper/Manganese/ Seleni/Zn 1 ml/ Insulin Human Regular 15 unit/ 

Total Parenteral Nutrition/Amino Acids/Dextrose/ Fat Emulsion Intravenous 1,800 

ml @  75 mls/hr TPN  CONT IV  Last administered on 5/31/20at 21:54;  Start 

5/31/20 at 22:00;  Stop 6/1/20 at 21:59;  Status DC


Potassium Chloride/Water 100 ml @  100 mls/hr 1X  ONCE IV  Last administered on 

6/1/20at 10:15;  Start 6/1/20 at 10:00;  Stop 6/1/20 at 10:59;  Status DC


Potassium Chloride 90 meq/ Magnesium Sulfate 20 meq/ Multivitamins 10 

ml/Chromium/ Copper/Manganese/ Seleni/Zn 1 ml/ Insulin Human Regular 20 unit/ 

Total Parenteral Nutrition/Amino Acids/Dextrose/ Fat Emulsion Intravenous 1,800 

ml @  75 mls/hr TPN  CONT IV  Last administered on 6/1/20at 22:28;  Start 6/1/20

at 22:00;  Stop 6/2/20 at 21:59;  Status DC


Potassium Chloride 90 meq/ Magnesium Sulfate 20 meq/ Multivitamins 10 

ml/Chromium/ Copper/Manganese/ Seleni/Zn 1 ml/ Insulin Human Regular 20 unit/ 

Total Parenteral Nutrition/Amino Acids/Dextrose/ Fat Emulsion Intravenous 1,800 

ml @  75 mls/hr TPN  CONT IV  Last administered on 6/2/20at 22:08;  Start 6/2/20

at 22:00;  Stop 6/3/20 at 21:59;  Status DC


Lorazepam (Ativan Inj) 0.25 mg PRN Q4HRS  PRN IVP ANXIETY / AGITATION Last 

administered on 8/12/20at 09:50;  Start 6/3/20 at 07:30


Potassium Chloride 90 meq/ Magnesium Sulfate 20 meq/ Multivitamins 10 

ml/Chromium/ Copper/Manganese/ Seleni/Zn 1 ml/ Insulin Human Regular 20 unit/ 

Total Parenteral Nutrition/Amino Acids/Dextrose/ Fat Emulsion Intravenous 1,800 

ml @  75 mls/hr TPN  CONT IV  Last administered on 6/3/20at 23:13;  Start 6/3/20

at 22:00;  Stop 6/4/20 at 21:59;  Status DC


Furosemide (Lasix) 40 mg DAILY IVP  Last administered on 6/5/20at 11:14;  Start 

6/3/20 at 13:30;  Stop 6/7/20 at 09:12;  Status DC


Fluoxetine HCl (PROzac) 20 mg QHS PEG  Last administered on 8/11/20at 20:47;  

Start 6/4/20 at 21:00


Fentanyl (Duragesic 50mcg/ Hr Patch) 1 patch Q72H TD  Last administered on 

6/4/20at 21:22;  Start 6/4/20 at 21:00;  Stop 6/13/20 at 12:00;  Status DC


Potassium Chloride 40 meq/ Potassium Acetate 60 meq/Magnesium Sulfate 10 meq/ 

Multivitamins 10 ml/Chromium/ Copper/Manganese/ Seleni/Zn 1 ml/ Insulin Human 

Regular 20 unit/ Total Parenteral Nutrition/Amino Acids/Dextrose/ Fat Emulsion 

Intravenous 1,800 ml @  75 mls/hr TPN  CONT IV  Last administered on 6/5/20at 

00:03;  Start 6/4/20 at 22:00;  Stop 6/5/20 at 21:59;  Status DC


Potassium Acetate 80 meq/Magnesium Sulfate 5 meq/ Multivitamins 10 ml/Chromium/ 

Copper/Manganese/ Seleni/Zn 1 ml/ Insulin Human Regular 20 unit/ Total 

Parenteral Nutrition/Amino Acids/Dextrose/ Fat Emulsion Intravenous 1,920 ml @  

80 mls/hr TPN  CONT IV  Last administered on 6/5/20at 21:59;  Start 6/5/20 at 

22:00;  Stop 6/6/20 at 21:59;  Status DC


Potassium Acetate 60 meq/Magnesium Sulfate 5 meq/ Multivitamins 10 ml/Chromium/ 

Copper/Manganese/ Seleni/Zn 1 ml/ Insulin Human Regular 30 unit/ Total 

Parenteral Nutrition/Amino Acids/Dextrose/ Fat Emulsion Intravenous 1,920 ml @  

80 mls/hr TPN  CONT IV  Last administered on 6/6/20at 21:54;  Start 6/6/20 at 

22:00;  Stop 6/7/20 at 21:59;  Status DC


Norepinephrine Bitartrate 8 mg/ Dextrose 258 ml @  13.332 mls/ hr CONT  PRN IV 

PER PROTOCOL Last administered on 7/2/20at 09:09;  Start 6/7/20 at 06:30;  Stop 

8/3/20 at 09:45;  Status DC


Albumin Human 500 ml @  125 mls/hr 1X  ONCE IV  Last administered on 6/7/20at 

08:10;  Start 6/7/20 at 08:15;  Stop 6/7/20 at 12:14;  Status DC


Potassium Acetate 40 meq/Magnesium Sulfate 5 meq/ Multivitamins 10 ml/Chromium/ 

Copper/Manganese/ Seleni/Zn 1 ml/ Insulin Human Regular 30 unit/ Total 

Parenteral Nutrition/Amino Acids/Dextrose/ Fat Emulsion Intravenous 1,920 ml @  

80 mls/hr TPN  CONT IV  Last administered on 6/7/20at 22:23;  Start 6/7/20 at 

22:00;  Stop 6/8/20 at 21:59;  Status DC


Meropenem 1 gm/ Sodium Chloride 100 ml @  200 mls/hr Q8HRS IV ;  Start 6/7/20 at

14:00;  Status Cancel


Meropenem 1 gm/ Sodium Chloride 100 ml @  200 mls/hr Q8HRS IV  Last administered

on 6/7/20at 11:04;  Start 6/7/20 at 10:00;  Stop 6/7/20 at 13:00;  Status DC


Meropenem 1 gm/ Sodium Chloride 100 ml @  200 mls/hr Q12HR IV  Last administered

on 6/25/20at 08:27;  Start 6/7/20 at 21:00;  Stop 6/25/20 at 08:56;  Status DC


Sodium Chloride 1,000 ml @  1,000 mls/hr 1X  ONCE IV  Last administered on 

6/7/20at 11:06;  Start 6/7/20 at 10:45;  Stop 6/7/20 at 11:44;  Status DC


Micafungin Sodium 100 mg/Dextrose 100 ml @  100 mls/hr Q24H IV  Last 

administered on 6/24/20at 12:34;  Start 6/7/20 at 11:00;  Stop 6/25/20 at 08:56;

 Status DC


Daptomycin 410 mg/ Sodium Chloride 50 ml @  100 mls/hr Q24H IV  Last admin

istered on 6/9/20at 13:33;  Start 6/7/20 at 14:00;  Stop 6/10/20 at 08:30;  

Status DC


Midazolam HCl (Versed) 2 mg STK-MED ONCE .ROUTE ;  Start 6/7/20 at 14:47;  Stop 

6/7/20 at 14:48;  Status DC


Fentanyl Citrate (Fentanyl 2ml Vial) 100 mcg STK-MED ONCE .ROUTE ;  Start 6/7/20

at 14:47;  Stop 6/7/20 at 14:48;  Status DC


Flumazenil (Romazicon) 0.5 mg STK-MED ONCE IV ;  Start 6/7/20 at 14:48;  Stop 

6/7/20 at 14:48;  Status DC


Naloxone HCl (Narcan) 0.4 mg STK-MED ONCE .ROUTE ;  Start 6/7/20 at 14:48;  Stop

6/7/20 at 14:48;  Status DC


Lidocaine HCl (Lidocaine 1% 20ml Vial) 20 ml STK-MED ONCE .ROUTE ;  Start 6/7/20

at 14:48;  Stop 6/7/20 at 14:48;  Status DC


Midazolam HCl (Versed) 2 mg 1X  ONCE IV  Last administered on 6/7/20at 15:28;  

Start 6/7/20 at 15:00;  Stop 6/7/20 at 15:01;  Status DC


Fentanyl Citrate (Fentanyl 2ml Vial) 100 mcg 1X  ONCE IV  Last administered on 

6/7/20at 15:28;  Start 6/7/20 at 15:00;  Stop 6/7/20 at 15:01;  Status DC


Lidocaine HCl (Lidocaine 1% 20ml Vial) 20 ml 1X  ONCE INJ  Last administered on 

6/7/20at 15:30;  Start 6/7/20 at 15:00;  Stop 6/7/20 at 15:01;  Status DC


Sodium Chloride 1,000 ml @  100 mls/hr Q10H IV  Last administered on 6/16/20at 

07:30;  Start 6/7/20 at 20:00;  Stop 6/16/20 at 11:26;  Status DC


Sodium Bicarbonate (Sodium Bicarb Adult 8.4% Syr) 50 meq 1X  ONCE IV  Last 

administered on 6/7/20at 21:47;  Start 6/7/20 at 22:00;  Stop 6/7/20 at 22:01;  

Status DC


Potassium Acetate 40 meq/Magnesium Sulfate 5 meq/ Multivitamins 10 ml/Chromium/ 

Copper/Manganese/ Seleni/Zn 1 ml/ Insulin Human Regular 30 unit/ Total 

Parenteral Nutrition/Amino Acids/Dextrose/ Fat Emulsion Intravenous 1,920 ml @  

80 mls/hr TPN  CONT IV  Last administered on 6/8/20at 22:28;  Start 6/8/20 at 

22:00;  Stop 6/9/20 at 21:59;  Status DC


Sodium Chloride 500 ml @  500 mls/hr 1X  ONCE IV  Last administered on 6/9/20at 

06:39;  Start 6/9/20 at 06:45;  Stop 6/9/20 at 07:44;  Status DC


Potassium Acetate 40 meq/Magnesium Sulfate 5 meq/ Multivitamins 10 ml/Chromium/ 

Copper/Manganese/ Seleni/Zn 1 ml/ Insulin Human Regular 30 unit/ Total 

Parenteral Nutrition/Amino Acids/Dextrose/ Fat Emulsion Intravenous 1,920 ml @  

80 mls/hr TPN  CONT IV  Last administered on 6/9/20at 22:03;  Start 6/9/20 at 

22:00;  Stop 6/10/20 at 21:59;  Status DC


Metoprolol Tartrate (Lopressor Vial) 5 mg PRN Q6HRS  PRN IVP HYPERTENSION Last 

administered on 8/11/20at 10:02;  Start 6/10/20 at 09:00


Potassium Acetate 40 meq/Magnesium Sulfate 5 meq/ Multivitamins 10 ml/Chromium/ 

Copper/Manganese/ Seleni/Zn 1 ml/ Insulin Human Regular 30 unit/ Total 

Parenteral Nutrition/Amino Acids/Dextrose/ Fat Emulsion Intravenous 1,920 ml @  

80 mls/hr TPN  CONT IV  Last administered on 6/10/20at 21:26;  Start 6/10/20 at 

22:00;  Stop 6/11/20 at 21:59;  Status DC


Potassium Acetate 40 meq/Magnesium Sulfate 5 meq/ Multivitamins 10 ml/Chromium/ 

Copper/Manganese/ Seleni/Zn 1 ml/ Insulin Human Regular 30 unit/ Total 

Parenteral Nutrition/Amino Acids/Dextrose/ Fat Emulsion Intravenous 1,920 ml @  

80 mls/hr TPN  CONT IV  Last administered on 6/11/20at 23:23;  Start 6/11/20 at 

22:00;  Stop 6/12/20 at 21:59;  Status DC


Potassium Acetate 40 meq/Magnesium Sulfate 5 meq/ Multivitamins 10 ml/Chromium/ 

Copper/Manganese/ Seleni/Zn 1 ml/ Insulin Human Regular 30 unit/ Total 

Parenteral Nutrition/Amino Acids/Dextrose/ Fat Emulsion Intravenous 1,920 ml @  

80 mls/hr TPN  CONT IV  Last administered on 6/12/20at 21:35;  Start 6/12/20 at 

22:00;  Stop 6/13/20 at 21:59;  Status DC


Furosemide (Lasix) 20 mg 1X  ONCE IVP  Last administered on 6/13/20at 06:26;  

Start 6/13/20 at 06:15;  Stop 6/13/20 at 06:16;  Status DC


Methylprednisolone Sodium Succinate (SOLU-Medrol 125MG VIAL) 125 mg 1X  ONCE IV 

Last administered on 6/13/20at 06:26;  Start 6/13/20 at 06:15;  Stop 6/13/20 at 

06:16;  Status DC


Albuterol/ Ipratropium (Duoneb) 3 ml Q4HRS NEB  Last administered on 8/12/20at 

08:46;  Start 6/13/20 at 08:00


Fentanyl Citrate 30 ml @ 0 mls/hr CONT  PRN IV SEE PROTOCOL Last administered on

7/4/20at 08:03;  Start 6/13/20 at 06:00;  Stop 7/4/20 at 12:42;  Status DC


Propofol 100 ml @ 0 mls/hr CONT  PRN IV SEE PROTOCOL Last administered on 

6/20/20at 23:50;  Start 6/13/20 at 06:00;  Stop 8/3/20 at 09:45;  Status DC


Fentanyl Citrate (Fentanyl 2ml Vial) 25 mcg PRN Q1HR  PRN IV SEE COMMENTS Last 

administered on 8/1/20at 23:56;  Start 6/13/20 at 06:00;  Stop 8/3/20 at 09:45; 

Status DC


Fentanyl Citrate (Fentanyl 2ml Vial) 50 mcg PRN Q1HR  PRN IV SEE COMMENTS Last 

administered on 8/3/20at 09:39;  Start 6/13/20 at 06:00;  Stop 8/3/20 at 09:45; 

Status DC


Chlorhexidine Gluconate (Peridex) 15 ml BID MM ;  Start 6/13/20 at 09:00;  Stop 

6/13/20 at 07:58;  Status DC


Potassium Acetate 40 meq/Magnesium Sulfate 5 meq/ Multivitamins 10 ml/Chromium/ 

Copper/Manganese/ Seleni/Zn 1 ml/ Insulin Human Regular 30 unit/ Total 

Parenteral Nutrition/Amino Acids/Dextrose/ Fat Emulsion Intravenous 1,920 ml @  

80 mls/hr TPN  CONT IV  Last administered on 6/13/20at 21:19;  Start 6/13/20 at 

22:00;  Stop 6/14/20 at 21:59;  Status DC


Acetylcysteine (Mucomyst 20% Resp Treatment) 600 mg BID NEB  Last administered 

on 6/19/20at 09:33;  Start 6/13/20 at 21:00;  Stop 6/19/20 at 10:39;  Status DC


Magnesium Sulfate 100 ml @  25 mls/hr 1X  ONCE IV  Last administered on 

6/13/20at 15:48;  Start 6/13/20 at 15:45;  Stop 6/13/20 at 19:44;  Status DC


Potassium Acetate 40 meq/Magnesium Sulfate 5 meq/ Multivitamins 10 ml/Chromium/ 

Copper/Manganese/ Seleni/Zn 1 ml/ Insulin Human Regular 30 unit/ Total 

Parenteral Nutrition/Amino Acids/Dextrose/ Fat Emulsion Intravenous 1,920 ml @  

80 mls/hr TPN  CONT IV  Last administered on 6/14/20at 21:35;  Start 6/14/20 at 

22:00;  Stop 6/15/20 at 21:59;  Status DC


Potassium Chloride/Water 100 ml @  100 mls/hr Q1H IV  Last administered on 

6/15/20at 08:31;  Start 6/15/20 at 07:00;  Stop 6/15/20 at 08:59;  Status DC


Potassium Acetate 40 meq/Magnesium Sulfate 5 meq/ Multivitamins 10 ml/Chromium/ 

Copper/Manganese/ Seleni/Zn 1 ml/ Insulin Human Regular 30 unit/ Total 

Parenteral Nutrition/Amino Acids/Dextrose/ Fat Emulsion Intravenous 1,920 ml @  

80 mls/hr TPN  CONT IV  Last administered on 6/15/20at 21:54;  Start 6/15/20 at 

22:00;  Stop 6/16/20 at 19:34;  Status DC


Lidocaine HCl (Buffered Lidocaine 1%) 3 ml STK-MED ONCE .ROUTE ;  Start 6/15/20 

at 12:14;  Stop 6/15/20 at 12:14;  Status DC


Lidocaine HCl (Buffered Lidocaine 1%) 3 ml 1X  ONCE IJ  Last administered on 

6/15/20at 13:11;  Start 6/15/20 at 13:00;  Stop 6/15/20 at 13:01;  Status DC


Magnesium Sulfate 50 ml @ 25 mls/hr 1X  ONCE IV ;  Start 6/16/20 at 08:15;  Stop

6/16/20 at 10:14;  Status DC


Potassium Acetate 40 meq/Magnesium Sulfate 10 meq/ Multivitamins 10 ml/Chromium/

Copper/Manganese/ Seleni/Zn 1 ml/ Insulin Human Regular 20 unit/ Total 

Parenteral Nutrition/Amino Acids/Dextrose/ Fat Emulsion Intravenous 1,920 ml @  

80 mls/hr TPN  CONT IV  Last administered on 6/16/20at 21:32;  Start 6/16/20 at 

22:00;  Stop 6/17/20 at 21:59;  Status DC


Potassium Chloride/Water 100 ml @  100 mls/hr Q1H IV  Last administered on 

6/17/20at 09:12;  Start 6/17/20 at 08:00;  Stop 6/17/20 at 09:59;  Status DC


Alteplase, Recombinant (Cathflo For Central Catheter Clearance) 4 mg 1X  ONCE 

INT CAT ;  Start 6/17/20 at 09:15;  Stop 6/17/20 at 09:16;  Status UNV


Alteplase, Recombinant (Cathflo For Central Catheter Clearance) 4 mg 1X  ONCE 

INT CAT ;  Start 6/17/20 at 09:15;  Stop 6/17/20 at 09:16;  Status UNV


Alteplase, Recombinant (Cathflo For Central Catheter Clearance) 4 mg 1X  ONCE 

INT CAT ;  Start 6/17/20 at 09:15;  Stop 6/17/20 at 09:16;  Status UNV


Alteplase, Recombinant 4 mg/ Sodium Chloride 20 ml @ 20 mls/hr 1X  ONCE IV  Last

administered on 6/17/20at 10:10;  Start 6/17/20 at 10:00;  Stop 6/17/20 at 

10:59;  Status DC


Alteplase, Recombinant 4 mg/ Sodium Chloride 20 ml @ 20 mls/hr 1X  ONCE IV  Last

administered on 6/17/20at 10:09;  Start 6/17/20 at 10:00;  Stop 6/17/20 at 

10:59;  Status DC


Alteplase, Recombinant 4 mg/ Sodium Chloride 20 ml @ 20 mls/hr 1X  ONCE IV  Last

administered on 6/17/20at 10:09;  Start 6/17/20 at 10:00;  Stop 6/17/20 at 

10:59;  Status DC


Potassium Acetate 60 meq/Magnesium Sulfate 10 meq/ Multivitamins 10 ml/Chromium/

Copper/Manganese/ Seleni/Zn 1 ml/ Insulin Human Regular 20 unit/ Total 

Parenteral Nutrition/Amino Acids/Dextrose/ Fat Emulsion Intravenous 1,920 ml @  

80 mls/hr TPN  CONT IV  Last administered on 6/17/20at 21:55;  Start 6/17/20 at 

22:00;  Stop 6/18/20 at 21:59;  Status DC


Albumin Human 500 ml @  125 mls/hr 1X  ONCE IV  Last administered on 6/18/20at 

12:01;  Start 6/18/20 at 11:15;  Stop 6/18/20 at 15:14;  Status DC


Sodium Chloride 500 ml @  500 mls/hr 1X  ONCE IV  Last administered on 6/18/20at

13:50;  Start 6/18/20 at 11:15;  Stop 6/18/20 at 12:14;  Status DC


Potassium Acetate 60 meq/Magnesium Sulfate 14 meq/ Multivitamins 10 ml/Chromium/

Copper/Manganese/ Seleni/Zn 1 ml/ Insulin Human Regular 20 unit/ Total 

Parenteral Nutrition/Amino Acids/Dextrose/ Fat Emulsion Intravenous 1,920 ml @  

80 mls/hr TPN  CONT IV  Last administered on 6/18/20at 22:26;  Start 6/18/20 at 

22:00;  Stop 6/19/20 at 21:59;  Status DC


Ciprofloxacin/ Dextrose 200 ml @  200 mls/hr Q12HR IV  Last administered on 

6/25/20at 08:27;  Start 6/18/20 at 21:00;  Stop 6/25/20 at 08:56;  Status DC


Albumin Human 250 ml @  62.5 mls/hr 1X  ONCE IV  Last administered on 6/19/20at 

11:09;  Start 6/19/20 at 11:00;  Stop 6/19/20 at 14:59;  Status DC


Furosemide (Lasix) 20 mg 1X  ONCE IVP  Last administered on 6/19/20at 14:52;  

Start 6/19/20 at 10:45;  Stop 6/19/20 at 10:49;  Status DC


Potassium Acetate 60 meq/Magnesium Sulfate 14 meq/ Multivitamins 10 ml/Chromium/

Copper/Manganese/ Seleni/Zn 1 ml/ Insulin Human Regular 15 unit/ Total 

Parenteral Nutrition/Amino Acids/Dextrose/ Fat Emulsion Intravenous 1,920 ml @  

80 mls/hr TPN  CONT IV  Last administered on 6/19/20at 22:08;  Start 6/19/20 at 

22:00;  Stop 6/20/20 at 21:59;  Status DC


Potassium Acetate 60 meq/Magnesium Sulfate 14 meq/ Multivitamins 10 ml/Chromium/

Copper/Manganese/ Seleni/Zn 1 ml/ Insulin Human Regular 15 unit/ Total 

Parenteral Nutrition/Amino Acids/Dextrose/ Fat Emulsion Intravenous 1,920 ml @  

80 mls/hr TPN  CONT IV  Last administered on 6/20/20at 22:12;  Start 6/20/20 at 

22:00;  Stop 6/21/20 at 21:59;  Status DC


Potassium Acetate 60 meq/Magnesium Sulfate 14 meq/ Multivitamins 10 ml/Chromium/

Copper/Manganese/ Seleni/Zn 1 ml/ Insulin Human Regular 15 unit/ Total 

Parenteral Nutrition/Amino Acids/Dextrose/ Fat Emulsion Intravenous 1,920 ml @  

80 mls/hr TPN  CONT IV  Last administered on 6/21/20at 22:22;  Start 6/21/20 at 

22:00;  Stop 6/22/20 at 21:59;  Status DC


Furosemide (Lasix) 20 mg 1X  ONCE IVP  Last administered on 6/22/20at 11:07;  

Start 6/22/20 at 10:30;  Stop 6/22/20 at 10:34;  Status DC


Potassium Acetate 60 meq/Magnesium Sulfate 14 meq/ Multivitamins 10 ml/Chromium/

Copper/Manganese/ Seleni/Zn 1 ml/ Insulin Human Regular 15 unit/ Sodium Chloride

20 meq/Total Parenteral Nutrition/Amino Acids/Dextrose/ Fat Emulsion Intravenous

1,920 ml @  80 mls/hr TPN  CONT IV  Last administered on 6/22/20at 21:54;  Start

6/22/20 at 22:00;  Stop 6/23/20 at 21:59;  Status DC


Potassium Acetate 30 meq/Magnesium Sulfate 14 meq/ Multivitamins 10 ml/Chromium/

Copper/Manganese/ Seleni/Zn 1 ml/ Insulin Human Regular 15 unit/ Sodium Chloride

20 meq/Potassium Chloride 30 meq/ Total Parenteral Nutrition/Amino Aci

ds/Dextrose/ Fat Emulsion Intravenous 1,920 ml @  80 mls/hr TPN  CONT IV  Last 

administered on 6/23/20at 21:46;  Start 6/23/20 at 22:00;  Stop 6/24/20 at 

21:59;  Status DC


Sodium Chloride 80 meq/Potassium Chloride 30 meq/ Potassium Acetate 30 

meq/Magnesium Sulfate 14 meq/ Multivitamins 10 ml/Chromium/ Copper/Manganese/ 

Seleni/Zn 1 ml/ Insulin Human Regular 15 unit/ Total Parenteral Nutrition/Amino 

Acids/Dextrose/ Fat Emulsion Intravenous 1,920 ml @  80 mls/hr TPN  CONT IV  

Last administered on 6/24/20at 22:33;  Start 6/24/20 at 22:00;  Stop 6/25/20 at 

21:59;  Status DC


Furosemide (Lasix) 40 mg 1X  ONCE IVP  Last administered on 6/24/20at 16:27;  

Start 6/24/20 at 15:30;  Stop 6/24/20 at 15:33;  Status DC


Albumin Human 250 ml @  62.5 mls/hr 1X  ONCE IV  Last administered on 6/24/20at 

16:27;  Start 6/24/20 at 15:30;  Stop 6/24/20 at 19:29;  Status DC


Sodium Chloride 80 meq/Potassium Chloride 30 meq/ Potassium Acetate 30 

meq/Magnesium Sulfate 14 meq/ Multivitamins 10 ml/Chromium/ Copper/Manganese/ 

Seleni/Zn 1 ml/ Insulin Human Regular 15 unit/ Total Parenteral Nutrition/Amino 

Acids/Dextrose/ Fat Emulsion Intravenous 1,920 ml @  80 mls/hr TPN  CONT IV  

Last administered on 6/25/20at 22:25;  Start 6/25/20 at 22:00;  Stop 6/26/20 at 

21:59;  Status DC


Sodium Chloride 80 meq/Potassium Chloride 30 meq/ Potassium Acetate 30 

meq/Magnesium Sulfate 14 meq/ Multivitamins 10 ml/Chromium/ Copper/Manganese/ 

Seleni/Zn 1 ml/ Insulin Human Regular 15 unit/ Total Parenteral Nutrition/Amino 

Acids/Dextrose/ Fat Emulsion Intravenous 1,920 ml @  80 mls/hr TPN  CONT IV  

Last administered on 6/26/20at 21:32;  Start 6/26/20 at 22:00;  Stop 6/27/20 at 

21:59;  Status DC


Sodium Chloride 80 meq/Potassium Chloride 30 meq/ Potassium Acetate 30 

meq/Magnesium Sulfate 14 meq/ Multivitamins 10 ml/Chromium/ Copper/Manganese/ 

Seleni/Zn 1 ml/ Insulin Human Regular 15 unit/ Total Parenteral Nutrition/Amino 

Acids/Dextrose/ Fat Emulsion Intravenous 1,920 ml @  80 mls/hr TPN  CONT IV  

Last administered on 6/27/20at 21:53;  Start 6/27/20 at 22:00;  Stop 6/28/20 at 

21:59;  Status DC


Acetylcysteine (Mucomyst 20% Resp Treatment) 600 mg RTBID NEB  Last administered

on 8/12/20at 08:46;  Start 6/27/20 at 12:00


Sodium Chloride 80 meq/Potassium Chloride 30 meq/ Potassium Acetate 30 

meq/Magnesium Sulfate 14 meq/ Multivitamins 10 ml/Chromium/ Copper/Manganese/ 

Seleni/Zn 1 ml/ Insulin Human Regular 15 unit/ Total Parenteral Nutrition/Amino 

Acids/Dextrose/ Fat Emulsion Intravenous 1,920 ml @  80 mls/hr TPN  CONT IV  

Last administered on 6/28/20at 22:06;  Start 6/28/20 at 22:00;  Stop 6/29/20 at 

21:59;  Status DC


Meropenem 500 mg/ Sodium Chloride 50 ml @  100 mls/hr Q6HRS IV  Last 

administered on 7/21/20at 06:15;  Start 6/28/20 at 18:00;  Stop 7/21/20 at 

08:23;  Status DC


Daptomycin 500 mg/ Sodium Chloride 50 ml @  100 mls/hr Q24H IV  Last 

administered on 7/6/20at 21:47;  Start 6/28/20 at 19:00;  Stop 7/7/20 at 08:13; 

Status DC


Sodium Chloride 80 meq/Potassium Chloride 30 meq/ Potassium Acetate 30 

meq/Magnesium Sulfate 14 meq/ Multivitamins 10 ml/Chromium/ Copper/Manganese/ 

Seleni/Zn 1 ml/ Insulin Human Regular 15 unit/ Total Parenteral Nutrition/Amino 

Acids/Dextrose/ Fat Emulsion Intravenous 1,920 ml @  80 mls/hr TPN  CONT IV  

Last administered on 6/29/20at 22:09;  Start 6/29/20 at 22:00;  Stop 6/30/20 at 

21:59;  Status DC


Heparin Sodium (Porcine) 1000 unit/Sodium Chloride 1,001 ml @  1,001 mls/hr 1X  

ONCE IRR ;  Start 6/30/20 at 06:00;  Stop 6/30/20 at 06:59;  Status DC


Propofol (Diprivan) 200 mg STK-MED ONCE IV ;  Start 6/30/20 at 07:44;  Stop 

6/30/20 at 07:44;  Status DC


Lidocaine HCl (Lidocaine Pf 2% Vial) 5 ml STK-MED ONCE .ROUTE ;  Start 6/30/20 

at 07:44;  Stop 6/30/20 at 07:44;  Status DC


Fentanyl Citrate (Fentanyl 2ml Vial) 100 mcg STK-MED ONCE .ROUTE ;  Start 

6/30/20 at 07:44;  Stop 6/30/20 at 07:44;  Status DC


Rocuronium Bromide (Zemuron) 100 mg STK-MED ONCE .ROUTE ;  Start 6/30/20 at 

07:44;  Stop 6/30/20 at 07:44;  Status DC


Micafungin Sodium 100 mg/Dextrose 100 ml @  100 mls/hr Q24H IV  Last 

administered on 8/4/20at 09:30;  Start 6/30/20 at 08:30;  Stop 8/5/20 at 08:20; 

Status DC


Bupivacaine HCl/ Epinephrine Bitart (Sensorcain-Epi 0.5%-1:[id number] Mpf) 30 ml 

STK-MED ONCE .ROUTE ;  Start 6/30/20 at 08:34;  Stop 6/30/20 at 08:35;  Status 

DC


Iohexol (Omnipaque 300 Mg/ml) 50 ml STK-MED ONCE .ROUTE  Last administered on 

6/30/20at 13:30;  Start 6/30/20 at 08:35;  Stop 6/30/20 at 08:35;  Status DC


Sodium Chloride 80 meq/Potassium Chloride 30 meq/ Potassium Acetate 30 

meq/Magnesium Sulfate 14 meq/ Multivitamins 10 ml/Chromium/ Copper/Manganese/ 

Seleni/Zn 1 ml/ Insulin Human Regular 15 unit/ Total Parenteral Nutrition/Amino 

Acids/Dextrose/ Fat Emulsion Intravenous 1,920 ml @  80 mls/hr TPN  CONT IV  

Last administered on 7/1/20at 01:22;  Start 6/30/20 at 22:00;  Stop 7/1/20 at 

21:59;  Status DC


Phenylephrine HCl (Ken-Synephrine Inj) 10 mg STK-MED ONCE .ROUTE ;  Start 

6/30/20 at 10:15;  Stop 6/30/20 at 10:15;  Status DC


Desflurane (Suprane) 90 ml STK-MED ONCE IH ;  Start 6/30/20 at 10:18;  Stop 

6/30/20 at 10:19;  Status DC


Albumin Human 500 ml @  As Directed STK-MED ONCE IV ;  Start 6/30/20 at 11:06;  

Stop 6/30/20 at 11:06;  Status DC


Vasopressin (Vasostrict) 20 unit STK-MED ONCE .ROUTE ;  Start 6/30/20 at 12:23; 

Stop 6/30/20 at 12:23;  Status DC


Phenylephrine HCl (Ken-Synephrine Inj) 10 mg STK-MED ONCE .ROUTE ;  Start 

6/30/20 at 13:33;  Stop 6/30/20 at 13:33;  Status DC


Phenylephrine HCl (Ken-Synephrine Inj) 10 mg STK-MED ONCE .ROUTE ;  Start 

6/30/20 at 13:33;  Stop 6/30/20 at 13:33;  Status DC


Ondansetron HCl (Zofran) 4 mg STK-MED ONCE .ROUTE ;  Start 6/30/20 at 13:33;  

Stop 6/30/20 at 13:33;  Status DC


Enoxaparin Sodium (Lovenox 40mg Syringe) 40 mg Q24H SQ  Last administered on 

8/12/20at 08:28;  Start 7/1/20 at 08:00


Sodium Chloride (Normal Saline Flush) 3 ml QSHIFT  PRN IV AFTER MEDS AND BLOOD 

DRAWS;  Start 6/30/20 at 14:45;  Stop 8/3/20 at 09:45;  Status DC


Naloxone HCl (Narcan) 0.4 mg PRN Q2MIN  PRN IV SEE INSTRUCTIONS;  Start 6/30/20 

at 14:45;  Stop 8/3/20 at 09:45;  Status DC


Sodium Chloride 1,000 ml @  25 mls/hr Q24H IV  Last administered on 8/2/20at 

20:55;  Start 6/30/20 at 14:33;  Stop 8/3/20 at 09:45;  Status DC


Morphine Sulfate (Morphine Sulfate) 1 mg PRN Q1HR  PRN IV PAIN Last administered

on 7/25/20at 18:28;  Start 6/30/20 at 14:45;  Stop 8/3/20 at 09:45;  Status DC


Midazolam HCl 100 mg/Sodium Chloride 100 ml @ 1 mls/hr CONT  PRN IV SEE I/O 

RECORD Last administered on 7/3/20at 18:48;  Start 6/30/20 at 14:45;  Stop 

8/3/20 at 09:45;  Status DC


Phenylephrine HCl (PHENYLEPHRINE in 0.9% NACL PF) 1 mg STK-MED ONCE IV ;  Start 

6/30/20 at 14:44;  Stop 6/30/20 at 14:45;  Status DC


Ephedrine Sulfate (ePHEDrine PF IN SALINE SYRINGE) 50 mg STK-MED ONCE IV ;  

Start 6/30/20 at 14:45;  Stop 6/30/20 at 14:45;  Status DC


Vasopressin 20 unit/Dextrose 101 ml @  12 mls/hr CONT  PRN IV SEE I/O RECORD 

Last administered on 7/7/20at 04:17;  Start 6/30/20 at 15:30;  Stop 8/3/20 at 

09:45;  Status DC


Sodium Chloride 1,000 ml @  1,000 mls/hr 1X  ONCE IV  Last administered on 6 /30/20at 15:42;  Start 6/30/20 at 15:45;  Stop 6/30/20 at 16:44;  Status DC


Albumin Human 500 ml @  125 mls/hr 1X  ONCE IV ;  Start 6/30/20 at 16:00;  Stop 

6/30/20 at 19:59;  Status DC


Albumin Human 500 ml @  125 mls/hr PRN Q1HR  PRN IV PER PROTOCOL;  Start 6/30/20

at 15:45;  Stop 8/3/20 at 09:52;  Status DC


Magnesium Sulfate 50 ml @ 25 mls/hr 1X  ONCE IV  Last administered on 6/30/20at 

17:02;  Start 6/30/20 at 16:30;  Stop 6/30/20 at 18:29;  Status DC


Sodium Bicarbonate (Sodium Bicarb Adult 8.4% Syr) 50 meq STK-MED ONCE .ROUTE ;  

Start 6/30/20 at 16:20;  Stop 6/30/20 at 16:20;  Status DC


Sodium Bicarbonate (Sodium Bicarb Adult 8.4% Syr) 100 meq 1X  ONCE IV  Last 

administered on 6/30/20at 17:07;  Start 6/30/20 at 16:30;  Stop 6/30/20 at 

16:31;  Status DC


Sodium Bicarbonate 150 meq/Dextrose 1,150 ml @  75 mls/hr 1X  ONCE IV  Last 

administered on 6/30/20at 20:02;  Start 6/30/20 at 16:30;  Stop 7/1/20 at 07:49;

 Status DC


Sodium Chloride 80 meq/Potassium Chloride 30 meq/ Potassium Acetate 30 

meq/Magnesium Sulfate 14 meq/ Multivitamins 10 ml/Chromium/ Copper/Manganese/ 

Seleni/Zn 1 ml/ Insulin Human Regular 15 unit/ Total Parenteral Nutrition/Amino 

Acids/Dextrose/ Fat Emulsion Intravenous 1,920 ml @  80 mls/hr TPN  CONT IV  

Last administered on 7/1/20at 23:05;  Start 7/1/20 at 22:00;  Stop 7/2/20 at 

21:59;  Status DC


Sodium Chloride 100 meq/Potassium Chloride 30 meq/ Potassium Acetate 30 me

q/Magnesium Sulfate 12 meq/ Multivitamins 10 ml/Chromium/ Copper/Manganese/ 

Seleni/Zn 1 ml/ Insulin Human Regular 15 unit/ Total Parenteral Nutrition/Amino 

Acids/Dextrose/ Fat Emulsion Intravenous 1,920 ml @  80 mls/hr TPN  CONT IV  

Last administered on 7/2/20at 21:52;  Start 7/2/20 at 22:00;  Stop 7/3/20 at 

21:59;  Status DC


Sodium Chloride 100 meq/Potassium Chloride 30 meq/ Potassium Acetate 30 

meq/Magnesium Sulfate 12 meq/ Multivitamins 10 ml/Chromium/ Copper/Manganese/ 

Seleni/Zn 1 ml/ Insulin Human Regular 15 unit/ Total Parenteral Nutrition/Amino 

Acids/Dextrose/ Fat Emulsion Intravenous 1,920 ml @  80 mls/hr TPN  CONT IV  

Last administered on 7/3/20at 21:46;  Start 7/3/20 at 22:00;  Stop 7/4/20 at 

21:59;  Status DC


Sodium Chloride 100 meq/Potassium Chloride 30 meq/ Potassium Acetate 30 

meq/Magnesium Sulfate 12 meq/ Multivitamins 10 ml/Chromium/ Copper/Manganese/ 

Seleni/Zn 1 ml/ Insulin Human Regular 15 unit/ Total Parenteral Nutrition/Amino 

Acids/Dextrose/ Fat Emulsion Intravenous 1,800 ml @  75 mls/hr TPN  CONT IV  

Last administered on 7/4/20at 22:04;  Start 7/4/20 at 22:00;  Stop 7/5/20 at 

21:59;  Status DC


Fentanyl Citrate 55 ml @ 0 mls/hr CONT  PRN IV SEE COMMENTS Last administered on

7/6/20at 23:55;  Start 7/4/20 at 13:00;  Stop 7/9/20 at 17:28;  Status DC


Sodium Chloride 100 meq/Potassium Chloride 30 meq/ Potassium Acetate 30 

meq/Magnesium Sulfate 12 meq/ Multivitamins 10 ml/Chromium/ Copper/Manganese/ 

Seleni/Zn 1 ml/ Insulin Human Regular 15 unit/ Total Parenteral Nutrition/Amino 

Acids/Dextrose/ Fat Emulsion Intravenous 1,680 ml @  70 mls/hr TPN  CONT IV  

Last administered on 7/5/20at 21:23;  Start 7/5/20 at 22:00;  Stop 7/6/20 at 

21:59;  Status DC


Sodium Chloride 110 meq/Potassium Chloride 30 meq/ Potassium Acetate 30 

meq/Magnesium Sulfate 15 meq/ Multivitamins 10 ml/Chromium/ Copper/Manganese/ 

Seleni/Zn 1 ml/ Insulin Human Regular 15 unit/ Total Parenteral Nutrition/Amino 

Acids/Dextrose/ Fat Emulsion Intravenous 1,680 ml @  70 mls/hr TPN  CONT IV  

Last administered on 7/6/20at 21:48;  Start 7/6/20 at 22:00;  Stop 7/7/20 at 

21:59;  Status DC


Sodium Chloride 110 meq/Potassium Chloride 30 meq/ Potassium Acetate 30 

meq/Magnesium Sulfate 15 meq/ Multivitamins 10 ml/Chromium/ Copper/Manganese/ 

Seleni/Zn 1 ml/ Insulin Human Regular 15 unit/ Total Parenteral Nutrition/Amino 

Acids/Dextrose/ Fat Emulsion Intravenous 1,680 ml @  70 mls/hr TPN  CONT IV  

Last administered on 7/7/20at 21:33;  Start 7/7/20 at 22:00;  Stop 7/8/20 at 

21:59;  Status DC


Sodium Chloride 110 meq/Potassium Chloride 30 meq/ Potassium Acetate 30 

meq/Magnesium Sulfate 15 meq/ Multivitamins 10 ml/Chromium/ Copper/Manganese/ 

Seleni/Zn 1 ml/ Insulin Human Regular 15 unit/ Total Parenteral Nutrition/Amino 

Acids/Dextrose/ Fat Emulsion Intravenous 1,680 ml @  70 mls/hr TPN  CONT IV  

Last administered on 7/8/20at 21:51;  Start 7/8/20 at 22:00;  Stop 7/9/20 at 

21:59;  Status DC


Sodium Chloride 90 meq/Potassium Chloride 30 meq/ Potassium Acetate 30 

meq/Magnesium Sulfate 15 meq/ Multivitamins 10 ml/Chromium/ Copper/Manganese/ 

Seleni/Zn 1 ml/ Insulin Human Regular 15 unit/ Total Parenteral Nutrition/Amino 

Acids/Dextrose/ Fat Emulsion Intravenous 1,680 ml @  70 mls/hr TPN  CONT IV  

Last administered on 7/9/20at 22:38;  Start 7/9/20 at 22:00;  Stop 7/10/20 at 

21:59;  Status DC


Fentanyl Citrate 30 ml @ 0 mls/hr CONT  PRN IV SEE I/O RECORD;  Start 7/9/20 at 

17:30;  Stop 8/3/20 at 09:45;  Status DC


Fentanyl (Duragesic 12mcg/ Hr Patch) 1 patch Q3DAYS TD  Last administered on 

8/12/20at 08:28;  Start 7/10/20 at 09:00


Sodium Chloride 90 meq/Potassium Chloride 30 meq/ Potassium Acetate 30 

meq/Magnesium Sulfate 15 meq/ Multivitamins 10 ml/Chromium/ Copper/Manganese/ 

Seleni/Zn 1 ml/ Insulin Human Regular 15 unit/ Total Parenteral Nutrition/Amino 

Acids/Dextrose/ Fat Emulsion Intravenous 1,680 ml @  70 mls/hr TPN  CONT IV  

Last administered on 7/10/20at 21:59;  Start 7/10/20 at 22:00;  Stop 7/11/20 at 

21:59;  Status DC


Sodium Chloride 90 meq/Potassium Chloride 30 meq/ Potassium Acetate 30 

meq/Magnesium Sulfate 15 meq/ Multivitamins 10 ml/Chromium/ Copper/Manganese/ 

Seleni/Zn 1 ml/ Insulin Human Regular 15 unit/ Total Parenteral Nutrition/Amino 

Acids/Dextrose/ Fat Emulsion Intravenous 1,680 ml @  70 mls/hr TPN  CONT IV  

Last administered on 7/11/20at 21:35;  Start 7/11/20 at 22:00;  Stop 7/12/20 at 

21:59;  Status DC


Vancomycin HCl (Vanco Per Pharmacy) 1 each PRN DAILY  PRN MC SEE COMMENTS Last 

administered on 7/14/20at 02:46;  Start 7/12/20 at 09:15;  Stop 7/15/20 at 

07:41;  Status DC


Ciprofloxacin/ Dextrose 200 ml @  200 mls/hr Q12HR IV  Last administered on 

7/20/20at 21:02;  Start 7/12/20 at 10:00;  Stop 7/21/20 at 08:20;  Status DC


Vancomycin HCl 2 gm/Sodium Chloride 500 ml @  250 mls/hr 1X  ONCE IV  Last 

administered on 7/12/20at 10:34;  Start 7/12/20 at 10:00;  Stop 7/12/20 at 

11:59;  Status DC


Sodium Chloride 90 meq/Potassium Chloride 30 meq/ Potassium Acetate 30 

meq/Magnesium Sulfate 15 meq/ Multivitamins 10 ml/Chromium/ Copper/Manganese/ 

Seleni/Zn 1 ml/ Insulin Human Regular 15 unit/ Total Parenteral Nutrition/Amino 

Acids/Dextrose/ Fat Emulsion Intravenous 1,680 ml @  70 mls/hr TPN  CONT IV  

Last administered on 7/12/20at 22:02;  Start 7/12/20 at 22:00;  Stop 7/13/20 at 

21:59;  Status DC


Diphenhydramine HCl (Benadryl) 25 mg 1X  ONCE IVP  Last administered on 

7/12/20at 14:26;  Start 7/12/20 at 14:30;  Stop 7/12/20 at 14:31;  Status DC


Vancomycin HCl 1.5 gm/Sodium Chloride 500 ml @  250 mls/hr Q8H IV  Last 

administered on 7/13/20at 03:08;  Start 7/12/20 at 18:30;  Stop 7/13/20 at 

12:24;  Status DC


Vancomycin HCl (Vancomycin Trough Level) 1 each 1X  ONCE MC  Last administered 

on 7/13/20at 10:00;  Start 7/13/20 at 10:00;  Stop 7/13/20 at 10:01;  Status DC


Sodium Chloride 90 meq/Potassium Chloride 30 meq/ Potassium Acetate 30 

meq/Magnesium Sulfate 15 meq/ Multivitamins 10 ml/Chromium/ Copper/Manganese/ 

Seleni/Zn 1 ml/ Insulin Human Regular 15 unit/ Total Parenteral Nutrition/Amino 

Acids/Dextrose/ Fat Emulsion Intravenous 1,680 ml @  70 mls/hr TPN  CONT IV  

Last administered on 7/13/20at 22:13;  Start 7/13/20 at 22:00;  Stop 7/14/20 at 

21:59;  Status DC


Vancomycin HCl (Vancomycin Random Level) 1 each 1X  ONCE MC  Last administered 

on 7/14/20at 01:00;  Start 7/14/20 at 01:00;  Stop 7/14/20 at 01:01;  Status DC


Vancomycin HCl 1.5 gm/Sodium Chloride 500 ml @  250 mls/hr Q12H IV  Last 

administered on 7/14/20at 22:07;  Start 7/14/20 at 10:00;  Stop 7/15/20 at 

07:41;  Status DC


Vancomycin HCl (Vancomycin Trough Level) 1 each 1X  ONCE MC ;  Start 7/15/20 at 

09:30;  Stop 7/15/20 at 09:31;  Status Cancel


Sodium Chloride 90 meq/Potassium Chloride 30 meq/ Potassium Acetate 30 

meq/Magnesium Sulfate 15 meq/ Multivitamins 10 ml/Chromium/ Copper/Manganese/ 

Seleni/Zn 1 ml/ Insulin Human Regular 15 unit/ Total Parenteral Nutrition/Amino 

Acids/Dextrose/ Fat Emulsion Intravenous 1,680 ml @  70 mls/hr TPN  CONT IV  

Last administered on 7/14/20at 22:08;  Start 7/14/20 at 22:00;  Stop 7/15/20 at 

21:59;  Status DC


Alteplase, Recombinant (Cathflo For Central Catheter Clearance) 1 mg 1X  ONCE 

INT CAT  Last administered on 7/14/20at 11:49;  Start 7/14/20 at 11:00;  Stop 

7/14/20 at 11:01;  Status DC


Daptomycin 500 mg/ Sodium Chloride 50 ml @  100 mls/hr Q24H IV  Last 

administered on 7/24/20at 08:25;  Start 7/15/20 at 09:00;  Stop 7/24/20 at 

08:38;  Status DC


Sodium Chloride 90 meq/Potassium Chloride 30 meq/ Potassium Acetate 30 

meq/Magnesium Sulfate 15 meq/ Multivitamins 10 ml/Chromium/ Copper/Manganese/ 

Seleni/Zn 1 ml/ Insulin Human Regular 15 unit/ Total Parenteral Nutrition/Amino 

Acids/Dextrose/ Fat Emulsion Intravenous 1,680 ml @  70 mls/hr TPN  CONT IV  

Last administered on 7/15/20at 22:55;  Start 7/15/20 at 22:00;  Stop 7/16/20 at 

21:59;  Status DC


Sodium Chloride 90 meq/Potassium Chloride 30 meq/ Potassium Acetate 30 

meq/Magnesium Sulfate 15 meq/ Multivitamins 10 ml/Chromium/ Copper/Manganese/ 

Seleni/Zn 1 ml/ Insulin Human Regular 15 unit/ Total Parenteral Nutrition/Amino 

Acids/Dextrose/ Fat Emulsion Intravenous 1,680 ml @  70 mls/hr TPN  CONT IV  

Last administered on 7/16/20at 22:06;  Start 7/16/20 at 22:00;  Stop 7/17/20 at 

21:59;  Status DC


Diphenhydramine HCl (Benadryl) 50 mg STK-MED ONCE .ROUTE ;  Start 7/16/20 at 

18:34;  Stop 7/16/20 at 18:35;  Status DC


Diphenhydramine HCl (Benadryl) 25 mg 1X  ONCE IM ;  Start 7/16/20 at 18:45;  

Stop 7/16/20 at 18:46;  Status DC


Diphenhydramine HCl (Benadryl) 25 mg 1X  ONCE IVP  Last administered on 

7/16/20at 18:56;  Start 7/16/20 at 19:00;  Stop 7/16/20 at 19:01;  Status DC


Alprazolam (Xanax) 0.5 mg PRN TID  PRN PO ANXIETY / AGITATION Last administered 

on 7/23/20at 11:49;  Start 7/17/20 at 08:00;  Stop 7/23/20 at 15:54;  Status DC


Sodium Chloride 110 meq/Potassium Chloride 30 meq/ Potassium Acetate 30 

meq/Magnesium Sulfate 15 meq/ Multivitamins 10 ml/Chromium/ Copper/Manganese/ 

Seleni/Zn 1 ml/ Insulin Human Regular 15 unit/ Total Parenteral Nutrition/Amino 

Acids/Dextrose/ Fat Emulsion Intravenous 1,680 ml @  70 mls/hr TPN  CONT IV  

Last administered on 7/17/20at 21:21;  Start 7/17/20 at 22:00;  Stop 7/18/20 at 

21:59;  Status DC


Sodium Chloride 110 meq/Potassium Chloride 30 meq/ Potassium Acetate 30 

meq/Magnesium Sulfate 15 meq/ Multivitamins 10 ml/Chromium/ Copper/Manganese/ 

Seleni/Zn 1 ml/ Insulin Human Regular 15 unit/ Total Parenteral Nutrition/Amino 

Acids/Dextrose/ Fat Emulsion Intravenous 1,680 ml @  70 mls/hr TPN  CONT IV  

Last administered on 7/18/20at 22:01;  Start 7/18/20 at 22:00;  Stop 7/19/20 at 

21:59;  Status DC


Alteplase, Recombinant (Cathflo For Central Catheter Clearance) 1 mg 1X  ONCE 

INT CAT  Last administered on 7/19/20at 08:23;  Start 7/19/20 at 08:15;  Stop 

7/19/20 at 08:16;  Status DC


Sodium Chloride 110 meq/Sodium Phosphate 10 mmol/ Potassium Chloride 30 meq/ 

Potassium Acetate 30 meq/Magnesium Sulfate 15 meq/ Multivitamins 10 ml/Chromium/

Copper/Manganese/ Seleni/Zn 1 ml/ Insulin Human Regular 15 unit/ Total 

Parenteral Nutrition/Amino Acids/Dextrose/ Fat Emulsion Intravenous 1,680 ml @  

70 mls/hr TPN  CONT IV  Last administered on 7/19/20at 22:03;  Start 7/19/20 at 

22:00;  Stop 7/20/20 at 21:59;  Status DC


Sodium Chloride 120 meq/Sodium Phosphate 10 mmol/ Potassium Chloride 30 meq/ 

Potassium Acetate 30 meq/Magnesium Sulfate 15 meq/ Multivitamins 10 ml/Chromium/

Copper/Manganese/ Seleni/Zn 1 ml/ Insulin Human Regular 15 unit/ Total 

Parenteral Nutrition/Amino Acids/Dextrose/ Fat Emulsion Intravenous 1,680 ml @  

70 mls/hr TPN  CONT IV  Last administered on 7/20/20at 22:08;  Start 7/20/20 at 

22:00;  Stop 7/21/20 at 21:59;  Status DC


Ceftazidime/ Avibactam 2.5 gm/ Sodium Chloride 100 ml @  50 mls/hr Q8HRS IV  

Last administered on 7/22/20at 05:32;  Start 7/21/20 at 14:00;  Stop 7/22/20 at 

07:48;  Status DC


Alteplase, Recombinant (Cathflo For Central Catheter Clearance) 1 mg 1X  ONCE 

INT CAT  Last administered on 7/21/20at 09:30;  Start 7/21/20 at 09:30;  Stop 

7/21/20 at 09:31;  Status DC


Sodium Chloride 120 meq/Sodium Phosphate 10 mmol/ Potassium Chloride 30 meq/ 

Potassium Acetate 30 meq/Magnesium Sulfate 15 meq/ Multivitamins 10 ml/Chromium/

Copper/Manganese/ Seleni/Zn 1 ml/ Insulin Human Regular 15 unit/ Total 

Parenteral Nutrition/Amino Acids/Dextrose/ Fat Emulsion Intravenous 1,680 ml @  

70 mls/hr TPN  CONT IV  Last administered on 7/21/20at 22:11;  Start 7/21/20 at 

22:00;  Stop 7/22/20 at 21:59;  Status DC


Iohexol (Omnipaque 300 Mg/ml) 75 ml 1X  ONCE IV  Last administered on 7/21/20at 

11:30;  Start 7/21/20 at 11:30;  Stop 7/21/20 at 11:31;  Status DC


Info (CONTRAST GIVEN -- Rx MONITORING) 1 each PRN DAILY  PRN MC SEE COMMENTS;  

Start 7/21/20 at 11:45;  Stop 7/23/20 at 11:44;  Status DC


Alteplase, Recombinant (Cathflo For Central Catheter Clearance) 1 mg 1X  ONCE 

INT CAT  Last administered on 7/21/20at 12:17;  Start 7/21/20 at 12:00;  Stop 

7/21/20 at 12:01;  Status DC


Cefepime HCl (Maxipime) 2 gm Q12HR IVP  Last administered on 7/22/20at 20:53;  

Start 7/22/20 at 09:00;  Stop 7/23/20 at 07:30;  Status DC


Sodium Chloride 120 meq/Sodium Phosphate 10 mmol/ Potassium Chloride 30 meq/ 

Potassium Acetate 30 meq/Magnesium Sulfate 15 meq/ Multivitamins 10 ml/Chromium/

Copper/Manganese/ Seleni/Zn 1 ml/ Insulin Human Regular 15 unit/ Total Parenter

al Nutrition/Amino Acids/Dextrose/ Fat Emulsion Intravenous 1,680 ml @  70 

mls/hr TPN  CONT IV  Last administered on 7/22/20at 21:25;  Start 7/22/20 at 

22:00;  Stop 7/23/20 at 21:59;  Status DC


Ceftazidime/ Avibactam 2.5 gm/ Sodium Chloride 250 ml @  125 mls/hr Q8HRS IV  

Last administered on 8/5/20at 05:38;  Start 7/23/20 at 08:00;  Stop 8/5/20 at 

08:20;  Status DC


Sodium Chloride 120 meq/Sodium Phosphate 10 mmol/ Potassium Chloride 30 meq/ 

Potassium Acetate 30 meq/Magnesium Sulfate 15 meq/ Multivitamins 10 ml/Chromium/

Copper/Manganese/ Seleni/Zn 1 ml/ Insulin Human Regular 15 unit/ Total 

Parenteral Nutrition/Amino Acids/Dextrose/ Fat Emulsion Intravenous 1,680 ml @  

70 mls/hr TPN  CONT IV  Last administered on 7/23/20at 22:35;  Start 7/23/20 at 

22:00;  Stop 7/24/20 at 21:59;  Status DC


Alprazolam (Xanax) 0.5 mg PRN QID  PRN PO ANXIETY / AGITATION Last administered 

on 8/12/20at 01:55;  Start 7/23/20 at 16:00


Acetaminophen/ Hydrocodone Bitart (Lortab 5/325) 1 tab PRN Q4HRS  PRN PO PAIN 

Last administered on 8/12/20at 01:40;  Start 7/23/20 at 16:00


Sodium Chloride 120 meq/Sodium Phosphate 10 mmol/ Potassium Chloride 30 meq/ 

Potassium Acetate 30 meq/Magnesium Sulfate 15 meq/ Multivitamins 10 ml/Chromium/

Copper/Manganese/ Seleni/Zn 1 ml/ Insulin Human Regular 15 unit/ Total 

Parenteral Nutrition/Amino Acids/Dextrose/ Fat Emulsion Intravenous 1,680 ml @  

70 mls/hr TPN  CONT IV  Last administered on 7/24/20at 21:50;  Start 7/24/20 at 

22:00;  Stop 7/25/20 at 21:59;  Status DC


Sodium Chloride 120 meq/Sodium Phosphate 10 mmol/ Potassium Chloride 30 meq/ 

Potassium Acetate 30 meq/Magnesium Sulfate 15 meq/ Multivitamins 10 ml/Chromium/

Copper/Manganese/ Seleni/Zn 1 ml/ Insulin Human Regular 15 unit/ Total 

Parenteral Nutrition/Amino Acids/Dextrose/ Fat Emulsion Intravenous 1,680 ml @  

70 mls/hr TPN  CONT IV  Last administered on 7/25/20at 22:14;  Start 7/25/20 at 

22:00;  Stop 7/26/20 at 21:59;  Status DC


Daptomycin 500 mg/ Sodium Chloride 50 ml @  100 mls/hr Q24H IV  Last 

administered on 8/4/20at 09:20;  Start 7/26/20 at 09:00;  Stop 8/5/20 at 08:20; 

Status DC


Sodium Chloride 120 meq/Sodium Phosphate 10 mmol/ Potassium Chloride 30 meq/ 

Potassium Acetate 30 meq/Magnesium Sulfate 15 meq/ Multivitamins 10 ml/Chromium/

Copper/Manganese/ Seleni/Zn 1 ml/ Insulin Human Regular 15 unit/ Total 

Parenteral Nutrition/Amino Acids/Dextrose/ Fat Emulsion Intravenous 1,680 ml @  

70 mls/hr TPN  CONT IV ;  Start 7/26/20 at 22:00;  Stop 7/27/20 at 21:59;  

Status Cancel


Amino Acids/ Glycerin/ Electrolytes 1,000 ml @  80 mls/hr [id number] IV  Last ad

ministered on 8/1/20at 18:10;  Start 7/26/20 at 10:15;  Stop 8/2/20 at 07:07;  

Status DC


Iohexol (Omnipaque 300 Mg/ml) 50 ml 1X  ONCE IJ ;  Start 7/28/20 at 11:00;  Stop

7/28/20 at 11:01;  Status DC


Sodium Chloride 500 ml @  500 mls/hr 1X  ONCE IV  Last administered on 7/28/20at

18:30;  Start 7/28/20 at 18:30;  Stop 7/28/20 at 19:29;  Status DC


Lidocaine HCl (Buffered Lidocaine 1%) 3 ml 1X  ONCE INJ ;  Start 7/30/20 at 

12:15;  Stop 7/30/20 at 12:17;  Status DC


Fentanyl Citrate (Fentanyl 2ml Vial) 25 mcg PRN Q6HRS  PRN IVP SEE INSTUCTIONS 

Last administered on 8/9/20at 06:00;  Start 8/3/20 at 10:00


Sodium Chloride 1,000 ml @  125 mls/hr Q8H IV  Last administered on 8/3/20at 

23:16;  Start 8/3/20 at 23:21;  Stop 8/4/20 at 09:23;  Status DC


Sodium Chloride 1,000 ml @  350 mls/hr 1X  ONCE IV  Last administered on 

8/3/20at 20:29;  Start 8/3/20 at 20:30;  Stop 8/3/20 at 23:21;  Status DC


Vitamin A/Vitamin D (Vitamin A & D Ointment) 1 thomas PRN Q1HR  PRN TP SKIN 

PROTECTION Last administered on 8/11/20at 10:44;  Start 8/4/20 at 09:15


Iohexol (Omnipaque 240 Mg/ml) 50 ml STK-MED ONCE .ROUTE ;  Start 8/4/20 at 

14:18;  Stop 8/4/20 at 14:19;  Status DC


Lidocaine HCl (Buffered Lidocaine 1%) 3 ml STK-MED ONCE .ROUTE ;  Start 8/4/20 

at 14:19;  Stop 8/4/20 at 14:19;  Status DC


Fentanyl Citrate (Fentanyl 2ml Vial) 100 mcg STK-MED ONCE .ROUTE ;  Start 8/4/20

at 15:03;  Stop 8/4/20 at 15:03;  Status DC


Lidocaine HCl (Buffered Lidocaine 1%) 5 ml 1X  ONCE INJ  Last administered on 

8/4/20at 15:00;  Start 8/4/20 at 15:45;  Stop 8/4/20 at 15:46;  Status DC


Iohexol (Omnipaque 240 Mg/ml) 10 ml 1X  ONCE IJ  Last administered on 8/4/20at 

15:00;  Start 8/4/20 at 15:45;  Stop 8/4/20 at 15:46;  Status DC


Multivitamins/ Minerals Therapeutic (Centrum Multivit-Mineral Liq) 5 ml DAILY 

PEG  Last administered on 8/12/20at 08:28;  Start 8/5/20 at 09:00


Alteplase, Recombinant 5 mg/ Sodium Chloride 30 ml @ 30 mls/hr 1X  PRN IV SEE 

COMMENTS;  Start 8/5/20 at 06:45;  Status UNV


Alteplase, Recombinant (Cathflo For Central Catheter Clearance) 1 mg 1X  ONCE 

INT CAT  Last administered on 8/5/20at 09:30;  Start 8/5/20 at 07:00;  Stop 

8/5/20 at 07:01;  Status DC


Sodium Chloride 1,000 ml @  125 mls/hr 1X  ONCE IV  Last administered on 

8/5/20at 16:12;  Start 8/5/20 at 14:30;  Stop 8/5/20 at 22:29;  Status DC


Furosemide (Lasix) 40 mg 1X  ONCE IVP  Last administered on 8/5/20at 16:17;  

Start 8/5/20 at 14:30;  Stop 8/5/20 at 14:33;  Status DC


Furosemide (Lasix) 40 mg [id number] IVP  Last administered on 8/6/20at 13:51;  Start 

8/6/20 at 09:00;  Stop 8/6/20 at 14:01;  Status DC


Sodium Chloride 1,000 ml @  125 mls/hr 1X  ONCE IV  Last administered on 

8/6/20at 08:20;  Start 8/6/20 at 07:45;  Stop 8/6/20 at 15:44;  Status DC


Calcitonin [place] (Miacalcin) 400 unit BID SQ  Last administered on 8/6/20at 

18:18;  Start 8/6/20 at 18:00;  Stop 8/7/20 at 15:56;  Status DC


Zoledronic Acid 100 ml @  400 mls/hr 1X  ONCE IV  Last administered on 8/7/20at 

13:04;  Start 8/7/20 at 12:00;  Stop 8/7/20 at 12:14;  Status DC


Metoprolol Tartrate (Lopressor Vial) 5 mg 1X  ONCE IVP  Last administered on 8 /9/20at 10:54;  Start 8/9/20 at 10:45;  Stop 8/9/20 at 10:46;  Status DC


Cefepime HCl (Maxipime) 2 gm Q12HR IVP  Last administered on 8/12/20at 08:29;  

Start 8/10/20 at 10:00


Metronidazole 100 ml @  100 mls/hr Q12HR IV  Last administered on 8/12/20at 

08:29;  Start 8/10/20 at 10:00


Sodium Chloride 1,000 ml @  75 mls/hr [id number] IV  Last administered on 8/11/20at

09:55;  Start 8/10/20 at 18:45;  Stop 8/12/20 at 11:12;  Status DC


Sodium Chloride 1,000 ml @  1,000 mls/hr 1X  ONCE IV  Last administered on 

8/11/20at 14:00;  Start 8/11/20 at 14:00;  Stop 8/11/20 at 14:59;  Status DC


Ringer's Solution 1,000 ml @  150 mls/hr [id number] IV  Last administered on 

8/12/20at 03:32;  Start 8/11/20 at 14:30


Sodium Bicarbonate (Sodium Bicarb Adult 8.4% Syr) 100 meq 1X  ONCE IV  Last 

administered on 8/11/20at 14:32;  Start 8/11/20 at 14:30;  Stop 8/11/20 at 

14:35;  Status DC


Sodium Bicarbonate (Sodium Bicarb Adult 8.4% Syr) 50 meq STK-MED ONCE .ROUTE ;  

Start 8/11/20 at 14:30;  Stop 8/11/20 at 14:30;  Status DC


Sodium Chloride 1,000 ml @  1,000 mls/hr 1X  ONCE IV  Last administered on 

8/12/20at 09:18;  Start 8/12/20 at 08:45;  Stop 8/12/20 at 09:44;  Status DC





Active Scripts


Active


Reported


Bisoprolol Fumarate 5 Mg Tablet 10 Mg PO DAILY


Vitals/I & O





Vital Sign - Last 24 Hours








 8/11/20 8/11/20 8/11/20 8/11/20





 13:13 13:36 13:50 14:01


 


Pulse  150  142


 


B/P (MAP)  73/37 (49) 77/32 (47) 93/39 (57)


 


Pulse Ox 100  100 100


 


O2 Delivery Nasal Cannula  Nasal Cannula Nasal Cannula


 


O2 Flow Rate 2.0  4.0 4.0





 8/11/20 8/11/20 8/11/20 8/11/20





 14:05 14:17 14:41 14:51


 


Pulse   134 132


 


B/P (MAP)   88/52 (64) 87/52 (64)


 


Pulse Ox   95 99


 


O2 Delivery Nasal Cannula Nasal Cannula Nasal Cannula Nasal Cannula


 


O2 Flow Rate 4.0 4.0 4.0 4.0





 8/11/20 8/11/20 8/11/20 8/11/20





 15:00 15:15 15:17 15:45


 


Temp 99.0   





 99.0   


 


Pulse 129 128  129


 


Resp 24   


 


B/P (MAP) 99/53 (68) 93/53 (66)  93/56 (68)


 


Pulse Ox 99 100  100


 


O2 Delivery Nasal Cannula Nasal Cannula Nasal Cannula Nasal Cannula


 


O2 Flow Rate 3.0 4.0 4.0 4.0


 


    





    





 8/11/20 8/11/20 8/11/20 8/11/20





 16:15 16:30 16:54 19:30


 


Temp    97.6





    97.6


 


Pulse 125 125  125


 


Resp    38


 


B/P (MAP) 95/58 (70) 88/58 (68)  92/47 (62)


 


Pulse Ox 100 100 100 98


 


O2 Delivery Nasal Cannula Nasal Cannula Nasal Cannula Nasal Cannula


 


O2 Flow Rate 4.0 4.0 2.0 2.0


 


    





    





 8/11/20 8/11/20 8/11/20 8/11/20





 20:00 20:09 20:47 21:47


 


Resp   40 38


 


Pulse Ox  100 100 100


 


O2 Delivery Nasal Cannula Nasal Cannula Nasal Cannula Nasal Cannula


 


O2 Flow Rate 2.0 2.0 2.0 2.0





 8/11/20 8/11/20 8/12/20 8/12/20





 23:20 23:54 01:40 02:40


 


Temp 97.9   





 97.9   


 


Pulse 123   


 


Resp 36  38 40


 


B/P (MAP) 108/69 (82)   


 


Pulse Ox 100 100 100 100


 


O2 Delivery Nasal Cannula Nasal Cannula Nasal Cannula Nasal Cannula


 


O2 Flow Rate 2.0 1.0 1.0 1.0


 


    





    





 8/12/20 8/12/20 8/12/20 8/12/20





 03:39 03:45 07:00 08:28


 


Temp  97.8 97.6 





  97.8 97.6 


 


Pulse  122 119 


 


Resp  28 28 


 


B/P (MAP)  103/56 (72) 103/64 (77) 


 


Pulse Ox 100 100 94 100


 


O2 Delivery Nasal Cannula Nasal Cannula Room Air Nasal Cannula


 


O2 Flow Rate 1.0 2.0  2.0


 


    





    





 8/12/20   





 08:59   


 


Pulse Ox 98   


 


O2 Delivery Room Air   














Intake and Output   


 


 8/11/20 8/11/20 8/12/20





 15:00 23:00 07:00


 


Intake Total 1190 ml 1203 ml 1059 ml


 


Output Total 0 ml 1200 ml 725 ml


 


Balance 1190 ml 3 ml 334 ml











Justicifation of Admission Dx:


Justifications for Admission:


Justification of Admission Dx:  Yes











MG ARGUETA MD                 Aug 12, 2020 11:35

## 2020-08-13 VITALS — SYSTOLIC BLOOD PRESSURE: 115 MMHG | DIASTOLIC BLOOD PRESSURE: 67 MMHG

## 2020-08-13 VITALS — SYSTOLIC BLOOD PRESSURE: 108 MMHG | DIASTOLIC BLOOD PRESSURE: 50 MMHG

## 2020-08-13 VITALS — SYSTOLIC BLOOD PRESSURE: 105 MMHG | DIASTOLIC BLOOD PRESSURE: 35 MMHG

## 2020-08-13 VITALS — SYSTOLIC BLOOD PRESSURE: 102 MMHG | DIASTOLIC BLOOD PRESSURE: 48 MMHG

## 2020-08-13 VITALS — DIASTOLIC BLOOD PRESSURE: 59 MMHG | SYSTOLIC BLOOD PRESSURE: 135 MMHG

## 2020-08-13 VITALS — SYSTOLIC BLOOD PRESSURE: 107 MMHG | DIASTOLIC BLOOD PRESSURE: 56 MMHG

## 2020-08-13 VITALS — SYSTOLIC BLOOD PRESSURE: 105 MMHG | DIASTOLIC BLOOD PRESSURE: 48 MMHG

## 2020-08-13 VITALS — SYSTOLIC BLOOD PRESSURE: 114 MMHG | DIASTOLIC BLOOD PRESSURE: 73 MMHG

## 2020-08-13 VITALS — DIASTOLIC BLOOD PRESSURE: 52 MMHG | SYSTOLIC BLOOD PRESSURE: 97 MMHG

## 2020-08-13 LAB
ANION GAP SERPL CALC-SCNC: 11 MMOL/L (ref 6–14)
BUN SERPL-MCNC: 75 MG/DL (ref 7–20)
CALCIUM SERPL-MCNC: 7 MG/DL (ref 8.5–10.1)
CHLORIDE SERPL-SCNC: 119 MMOL/L (ref 98–107)
CO2 SERPL-SCNC: 21 MMOL/L (ref 21–32)
CREAT SERPL-MCNC: 2 MG/DL (ref 0.6–1)
ERYTHROCYTE [DISTWIDTH] IN BLOOD BY AUTOMATED COUNT: 20.5 % (ref 11.5–14.5)
GFR SERPLBLD BASED ON 1.73 SQ M-ARVRAT: 26.5 ML/MIN
GLUCOSE SERPL-MCNC: 164 MG/DL (ref 70–99)
HCT VFR BLD CALC: 23.4 % (ref 36–47)
HGB BLD-MCNC: 7 G/DL (ref 12–15.5)
MCH RBC QN AUTO: 27 PG (ref 25–35)
MCHC RBC AUTO-ENTMCNC: 30 G/DL (ref 31–37)
MCV RBC AUTO: 91 FL (ref 79–100)
PLATELET # BLD AUTO: 394 X10^3/UL (ref 140–400)
POTASSIUM SERPL-SCNC: 4.1 MMOL/L (ref 3.5–5.1)
RBC # BLD AUTO: 2.58 X10^6/UL (ref 3.5–5.4)
SODIUM SERPL-SCNC: 151 MMOL/L (ref 136–145)
WBC # BLD AUTO: 12.2 X10^3/UL (ref 4–11)

## 2020-08-13 RX ADMIN — ACETYLCYSTEINE SCH MG: 200 INHALANT RESPIRATORY (INHALATION) at 08:00

## 2020-08-13 RX ADMIN — IPRATROPIUM BROMIDE AND ALBUTEROL SULFATE SCH ML: .5; 3 SOLUTION RESPIRATORY (INHALATION) at 08:03

## 2020-08-13 RX ADMIN — Medication PRN APP: at 08:36

## 2020-08-13 RX ADMIN — FENTANYL CITRATE PRN MCG: 50 INJECTION INTRAMUSCULAR; INTRAVENOUS at 15:54

## 2020-08-13 RX ADMIN — HYDROCODONE BITARTRATE AND ACETAMINOPHEN PRN TAB: 5; 325 TABLET ORAL at 12:25

## 2020-08-13 RX ADMIN — SODIUM CHLORIDE, SODIUM LACTATE, POTASSIUM CHLORIDE, AND CALCIUM CHLORIDE SCH MLS/HR: .6; .31; .03; .02 INJECTION, SOLUTION INTRAVENOUS at 06:27

## 2020-08-13 RX ADMIN — ENOXAPARIN SODIUM SCH MG: 40 INJECTION SUBCUTANEOUS at 08:00

## 2020-08-13 RX ADMIN — INSULIN LISPRO SCH UNITS: 100 INJECTION, SOLUTION INTRAVENOUS; SUBCUTANEOUS at 06:00

## 2020-08-13 RX ADMIN — SODIUM CHLORIDE, SODIUM LACTATE, POTASSIUM CHLORIDE, AND CALCIUM CHLORIDE SCH MLS/HR: .6; .31; .03; .02 INJECTION, SOLUTION INTRAVENOUS at 00:46

## 2020-08-13 RX ADMIN — PANTOPRAZOLE SODIUM SCH MG: 40 INJECTION, POWDER, FOR SOLUTION INTRAVENOUS at 08:36

## 2020-08-13 RX ADMIN — SODIUM CHLORIDE, SODIUM LACTATE, POTASSIUM CHLORIDE, AND CALCIUM CHLORIDE SCH MLS/HR: .6; .31; .03; .02 INJECTION, SOLUTION INTRAVENOUS at 10:19

## 2020-08-13 RX ADMIN — IPRATROPIUM BROMIDE AND ALBUTEROL SULFATE SCH ML: .5; 3 SOLUTION RESPIRATORY (INHALATION) at 19:55

## 2020-08-13 RX ADMIN — INSULIN LISPRO SCH UNITS: 100 INJECTION, SOLUTION INTRAVENOUS; SUBCUTANEOUS at 11:28

## 2020-08-13 RX ADMIN — IPRATROPIUM BROMIDE AND ALBUTEROL SULFATE SCH ML: .5; 3 SOLUTION RESPIRATORY (INHALATION) at 04:09

## 2020-08-13 RX ADMIN — INSULIN LISPRO SCH UNITS: 100 INJECTION, SOLUTION INTRAVENOUS; SUBCUTANEOUS at 00:00

## 2020-08-13 RX ADMIN — HYDROCODONE BITARTRATE AND ACETAMINOPHEN PRN TAB: 5; 325 TABLET ORAL at 03:26

## 2020-08-13 RX ADMIN — IPRATROPIUM BROMIDE AND ALBUTEROL SULFATE SCH ML: .5; 3 SOLUTION RESPIRATORY (INHALATION) at 15:39

## 2020-08-13 RX ADMIN — IPRATROPIUM BROMIDE AND ALBUTEROL SULFATE SCH ML: .5; 3 SOLUTION RESPIRATORY (INHALATION) at 11:42

## 2020-08-13 RX ADMIN — INSULIN LISPRO SCH UNITS: 100 INJECTION, SOLUTION INTRAVENOUS; SUBCUTANEOUS at 18:00

## 2020-08-13 RX ADMIN — SODIUM CHLORIDE, SODIUM LACTATE, POTASSIUM CHLORIDE, AND CALCIUM CHLORIDE SCH MLS/HR: .6; .31; .03; .02 INJECTION, SOLUTION INTRAVENOUS at 16:02

## 2020-08-13 RX ADMIN — CEFEPIME SCH GM: 2 INJECTION, POWDER, FOR SOLUTION INTRAVENOUS at 08:38

## 2020-08-13 RX ADMIN — IPRATROPIUM BROMIDE AND ALBUTEROL SULFATE SCH ML: .5; 3 SOLUTION RESPIRATORY (INHALATION) at 00:18

## 2020-08-13 RX ADMIN — ACETYLCYSTEINE SCH MG: 200 INHALANT RESPIRATORY (INHALATION) at 19:55

## 2020-08-13 RX ADMIN — MULTIVITAMIN SCH ML: LIQUID ORAL at 08:43

## 2020-08-13 NOTE — NUR
SS following up with discharge planning. SS reviewed pt chart and discussed with pt RN. Pt 
is currently requiring oxygen. Pt having unit of blood today due to hemoglobin. Pt on IV 
Cefepime and IV Flagyl. Pt on tube feeds. Pt has bags over drainage sites. Pt max assist 
with PT/OT. Pt self pay. [facility] continues to try and get needed information from pt's 
family for disability application. SS will continue to follow for discharge planning.

## 2020-08-13 NOTE — PDOC
PROGRESS NOTES


Date of Service:


DATE: 8/13/20 


TIME: 12:40





Chief Complaint


Chief Complaint


S/P Exp. 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 

parapsilosis & PSAE 07/26 (I-merrem/ceftazidime/AZT/cefepime))


Leucocytosis 


Fevers, intermittent


Acute gallstone pancreatitis with persistent necrosis


Acute hypoxic Respiratory failure required mechanical ventilation


Tracheostomy 


Bilateral pleural effusions/pulm edema s/p Throacentesis on 6/15/2020


HTN 


Intractable pain


Intractable nausea


Covid 19 negative


Acute on chronic anemia 


Abd distention - U/S and CT reviewed s/p 0.4 L of opaque, debris-containing 

ascites was removed 5/6


Gallstone pancreatitis with necrosis. 


   -CT A/P 6/6 showed multiple pseudocysts, slight larger on the right. s/p 

drains x 3, 6/7.  + PSAE (MDRO-R Cefepime, Zosyn ANSON < 64) and yeast, 


   -s/p drain 4/27. C. parapsilosis. s/p drain 5/6 + yeast & high amylase; s/p 

additional drain on 5/8. Drains removed. 


Ascites s/p paracentesis 4/15 & 5/6. C. parapsilosis 


JED. off HD. 


A large fluid collection in the pancreatic bed has slightly decreased in size, 

described below, the pancreas itself is difficult to  visualize, which could be 

due to necrosis or obscuration of pancreatic  parenchyma from the surrounding 

fluid collection.6/15 


- 4/27 status post ROBERT drain placement + C paropsilosis. s/p additional drains 

5/8


Hypocalcemia 


Prediabetes


s/p trach


Hyperglycemia


Severe protein-caloric malnutrition


Extensive retroperitoneal fluid collections persist. Percutaneous  drains remain

within the collections in both paracolic gutters. These  communicate with 

additional pelvic and peripancreatic collections.





History of Present Illness


History of Present Illness


8/13,   increased IV fluid, cont current


bolusing daily ,  LR increased


cr better,    Hgb worse today, will give 1 u PRBC





8/12.   pain in legs,    BP better,  some better Urine outptu





8/11.   dyspnea and mild distress worsened this AM.  but was able to walk some 

with PT later.


still  sometimes,    








08/10/2020


Patient seen and examined


Afebrile


Resting in NAD


Tachycardic and tachypneic overnight


Recurring leukocytosis, 23.0


Chart reviewed


Discussed with RN





08/9: Afebrile.  Calcium down to 10.1.  Little more tachycardic today with some 

more secretions. No O2 requirements. Does not wish to wear her speaking valve. 

Will check CXR.


8/8: Afebrile, weak, having more secretions not wearing a speaking valve today. 

WBC 10, Hb 8.8, , and K3.7, BUN 12, CR 0.5, calcium down to 10.3.  After 

zoledronic acid


8/7: Had a rash after calcitonin injection.  Discussed with nephrology with her 

calcium moving from 11.3-10.9 will give IV bisphosphonate today, zoledronic 

acid.


8/6: Hb stable. Afebrile. Weak. Calcium increased. Shortness of breath is 

improving. Plan for calcitonin today, lasix, and saline


8/5: Hemoglobin abnormally low on repeat has been stable 7.2.  Calcium up to 

10.9.  She is able to work with PT, she is asking to eat.  Social work is been 

having difficulties with both of her daughters completing the financial aspect 

of disability paperwork which is been prolonging her stay over the past 5 

months.  Plan to check PTH and to treat hypercalcemia with 1 L normal saline 40 

mg of IV Lasix and repeat labs in a.m.


8/4: Not wishing to wear her speaking valve. J tube having some difficulties. 

Afebrile. Rolf bedside to aid her. transferred from ICU today


8/3: No overnight events. Calcium 10.7 on AM labs. She is still a bit slow to to

respond, but follows commands well. Does not want speaking valve with me. Pain 

is better controlled in her abdomen. Wound care to see today. Will check liver 

function and phos levels given her calcium elevation.


8/2: Patient seen and examined bedside.  Positive fluid balance in the past 24 

hours.  Patient's chart, labs, images were reviewed and discussed with RN


8/1: No acute events overnight.  Seen and examined at bedside.  Patient had a 

fever 100.2.  Negative fluid balance of 150 cc.  Patient's chart, labs, images 

were reviewed and discussed with RN


7/31: Patient seen and examined at bedside.  No acute events overnight.  

Positive fluid balance 633.  Patient's chart, labs, images were reviewed and 

discussed with RN


7/30: Patient seen and examined bedside.  No acute events overnight.  Patient's 

chart, labs, images were reviewed and discussed with RN


7/28: Patient seen and examined bedside.  Patient appears to be in no obvious 

pain.  All drains have been adequately draining.  Patient continues to be on 

TPN.  Chart labs and imaging was reviewed.  Negative fluid balance of 270 cc


7/27:Patient seen and examined in the ICU.  Patient still requires extensive 

care.  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  Chart, labs, imaging was

reviewed.  UOP with + 500cc balance.


7/25: Patient seen in and examined in the ICU. She is still extremely critically

ill. Appears weak, frail, and pale. Better color today with improved eye contact

and expression. Discussed with RN. Chart reviewed


7/21: Patient seen and examined in the ICU, She is still extremely critically 

ill, Appears extremely weak frail and pale, Discussed with RN, Chart reviewed





Vitals


Vitals





Vital Signs








  Date Time  Temp Pulse Resp B/P (MAP) Pulse Ox O2 Delivery O2 Flow Rate FiO2


 


8/13/20 12:25     98 Nasal Cannula 1.5 


 


8/13/20 10:37 97.6 120 24 105/35 (58)    





 97.6       











Physical Exam


Physical Exam


GENERAL: Resting in bed, NAD


HEENT: Pupils equal, oral cavity dry. OC/Op - Dry


NECK:  Tracheostomy capped


LUNGS: Diminished aeration bases,


HEART:  S1, S2, 


ABDOMEN: Less distended, mild- bowel sounds hypoactive, soft, GJ drain present, 

drainage bag in place with depedent drainage


: [place] in place 


EXTREMITIES: Trace generalized edema, no cyanosis. Yeast in groin area


SKIN: warm touch. No signs of rash.  


NEURO: - Alert and responsive


RUE  PICC site without signs of complications. PIV s clean


EC fistula


General:  Cooperative, mild distress


Heart:  Normal S1, Normal S2, Other


Lungs:  Clear


Abdomen:  Normal bowel sounds, Soft, No tenderness


Extremities:  No clubbing, No cyanosis, Other (Diffuse edema)


Skin:  No breakdown





Labs


LABS





Laboratory Tests








Test


 8/12/20


17:40 8/13/20


00:26 8/13/20


04:30 8/13/20


06:58


 


Glucose (Fingerstick)


 156 mg/dL


(70-99) 157 mg/dL


(70-99) 


 192 mg/dL


(70-99)


 


White Blood Count


 


 


 12.2 x10^3/uL


(4.0-11.0) 





 


Red Blood Count


 


 


 2.58 x10^6/uL


(3.50-5.40) 





 


Hemoglobin


 


 


 7.0 g/dL


(12.0-15.5) 





 


Hematocrit


 


 


 23.4 %


(36.0-47.0) 





 


Mean Corpuscular Volume   91 fL ()  


 


Mean Corpuscular Hemoglobin   27 pg (25-35)  


 


Mean Corpuscular Hemoglobin


Concent 


 


 30 g/dL


(31-37) 





 


Red Cell Distribution Width


 


 


 20.5 %


(11.5-14.5) 





 


Platelet Count


 


 


 394 x10^3/uL


(140-400) 





 


Sodium Level


 


 


 151 mmol/L


(136-145) 





 


Potassium Level


 


 


 4.1 mmol/L


(3.5-5.1) 





 


Chloride Level


 


 


 119 mmol/L


() 





 


Carbon Dioxide Level


 


 


 21 mmol/L


(21-32) 





 


Anion Gap   11 (6-14)  


 


Blood Urea Nitrogen


 


 


 75 mg/dL


(7-20) 





 


Creatinine


 


 


 2.0 mg/dL


(0.6-1.0) 





 


Estimated GFR


(Cockcroft-Gault) 


 


 26.5 


 





 


Glucose Level


 


 


 164 mg/dL


(70-99) 





 


Calcium Level


 


 


 7.0 mg/dL


(8.5-10.1) 





 


Test


 8/13/20


11:14 


 


 





 


Glucose (Fingerstick)


 144 mg/dL


(70-99) 


 


 














Assessment and Plan


Assessmemt and Plan


Problems


Medical Problems:


(1) Acute pancreatitis


Status: Acute  





(2) Cholelithiasis


Status: Acute  











Comment


Review of Relevant


I have reviewed the following items josy (where applicable) has been applied.


Labs





Laboratory Tests








Test


 8/11/20


13:42 8/11/20


14:30 8/11/20


18:09 8/11/20


21:30


 


O2 Saturation 98 % (92-99)    


 


Arterial Blood pH


 7.29


(7.35-7.45) 


 


 





 


Arterial Blood pCO2 at


Patient Temp 35 mmHg


(35-46) 


 


 





 


Arterial Blood pO2 at Patient


Temp 105 mmHg


() 


 


 





 


Arterial Blood HCO3


 17 mmol/L


(21-28) 


 


 





 


Arterial Blood Base Excess


 -9 mmol/L


(-3-3) 


 


 





 


FiO2 4l n.c.    


 


White Blood Count


 


 19.3 x10^3/uL


(4.0-11.0) 


 





 


Red Blood Count


 


 2.64 x10^6/uL


(3.50-5.40) 


 





 


Hemoglobin


 


 7.4 g/dL


(12.0-15.5) 


 





 


Hematocrit


 


 23.4 %


(36.0-47.0) 


 





 


Mean Corpuscular Volume  89 fL ()   


 


Mean Corpuscular Hemoglobin  28 pg (25-35)   


 


Mean Corpuscular Hemoglobin


Concent 


 32 g/dL


(31-37) 


 





 


Red Cell Distribution Width


 


 20.0 %


(11.5-14.5) 


 





 


Platelet Count


 


 439 x10^3/uL


(140-400) 


 





 


Neutrophils (%) (Auto)  93 % (31-73)   


 


Lymphocytes (%) (Auto)  2 % (24-48)   


 


Monocytes (%) (Auto)  4 % (0-9)   


 


Eosinophils (%) (Auto)  0 % (0-3)   


 


Basophils (%) (Auto)  0 % (0-3)   


 


Neutrophils # (Auto)


 


 18.0 x10^3/uL


(1.8-7.7) 


 





 


Lymphocytes # (Auto)


 


 0.4 x10^3/uL


(1.0-4.8) 


 





 


Monocytes # (Auto)


 


 0.8 x10^3/uL


(0.0-1.1) 


 





 


Eosinophils # (Auto)


 


 0.0 x10^3/uL


(0.0-0.7) 


 





 


Basophils # (Auto)


 


 0.0 x10^3/uL


(0.0-0.2) 


 





 


Sodium Level


 


 154 mmol/L


(136-145) 


 





 


Potassium Level


 


 5.2 mmol/L


(3.5-5.1) 


 





 


Chloride Level


 


 117 mmol/L


() 


 





 


Carbon Dioxide Level


 


 29 mmol/L


(21-32) 


 





 


Anion Gap  8 (6-14)   


 


Blood Urea Nitrogen


 


 69 mg/dL


(7-20) 


 





 


Creatinine


 


 2.2 mg/dL


(0.6-1.0) 


 





 


Estimated GFR


(Cockcroft-Gault) 


 23.7 


 


 





 


BUN/Creatinine Ratio  31 (6-20)   


 


Glucose Level


 


 219 mg/dL


(70-99) 


 





 


Lactic Acid Level


 


 3.3 mmol/L


(0.4-2.0) 


 2.8 mmol/L


(0.4-2.0)


 


Calcium Level


 


 7.4 mg/dL


(8.5-10.1) 


 





 


Total Bilirubin


 


 0.3 mg/dL


(0.2-1.0) 


 





 


Aspartate Amino Transf


(AST/SGOT) 


 15 U/L (15-37) 


 


 





 


Alanine Aminotransferase


(ALT/SGPT) 


 10 U/L (14-59) 


 


 





 


Alkaline Phosphatase


 


 76 U/L


() 


 





 


Total Protein


 


 4.9 g/dL


(6.4-8.2) 


 





 


Albumin


 


 1.3 g/dL


(3.4-5.0) 


 





 


Albumin/Globulin Ratio  0.4 (1.0-1.7)   


 


Glucose (Fingerstick)


 


 


 194 mg/dL


(70-99) 





 


Test


 8/12/20


00:38 8/12/20


05:15 8/12/20


07:19 8/12/20


11:59


 


Glucose (Fingerstick)


 167 mg/dL


(70-99) 


 166 mg/dL


(70-99) 126 mg/dL


(70-99)


 


White Blood Count


 


 13.8 x10^3/uL


(4.0-11.0) 


 





 


Red Blood Count


 


 2.64 x10^6/uL


(3.50-5.40) 


 





 


Hemoglobin


 


 7.3 g/dL


(12.0-15.5) 


 





 


Hematocrit


 


 23.7 %


(36.0-47.0) 


 





 


Mean Corpuscular Volume  90 fL ()   


 


Mean Corpuscular Hemoglobin  28 pg (25-35)   


 


Mean Corpuscular Hemoglobin


Concent 


 31 g/dL


(31-37) 


 





 


Red Cell Distribution Width


 


 19.6 %


(11.5-14.5) 


 





 


Platelet Count


 


 397 x10^3/uL


(140-400) 


 





 


Neutrophils (%) (Auto)  87 % (31-73)   


 


Lymphocytes (%) (Auto)  7 % (24-48)   


 


Monocytes (%) (Auto)  5 % (0-9)   


 


Eosinophils (%) (Auto)  1 % (0-3)   


 


Basophils (%) (Auto)  0 % (0-3)   


 


Neutrophils # (Auto)


 


 12.0 x10^3/uL


(1.8-7.7) 


 





 


Lymphocytes # (Auto)


 


 0.9 x10^3/uL


(1.0-4.8) 


 





 


Monocytes # (Auto)


 


 0.7 x10^3/uL


(0.0-1.1) 


 





 


Eosinophils # (Auto)


 


 0.2 x10^3/uL


(0.0-0.7) 


 





 


Basophils # (Auto)


 


 0.0 x10^3/uL


(0.0-0.2) 


 





 


Sodium Level


 


 152 mmol/L


(136-145) 


 





 


Potassium Level


 


 4.6 mmol/L


(3.5-5.1) 


 





 


Chloride Level


 


 117 mmol/L


() 


 





 


Carbon Dioxide Level


 


 26 mmol/L


(21-32) 


 





 


Anion Gap  9 (6-14)   


 


Blood Urea Nitrogen


 


 76 mg/dL


(7-20) 


 





 


Creatinine


 


 2.1 mg/dL


(0.6-1.0) 


 





 


Estimated GFR


(Cockcroft-Gault) 


 25.0 


 


 





 


BUN/Creatinine Ratio  36 (6-20)   


 


Glucose Level


 


 171 mg/dL


(70-99) 


 





 


Calcium Level


 


 7.4 mg/dL


(8.5-10.1) 


 





 


Total Bilirubin


 


 0.2 mg/dL


(0.2-1.0) 


 





 


Aspartate Amino Transf


(AST/SGOT) 


 20 U/L (15-37) 


 


 





 


Alanine Aminotransferase


(ALT/SGPT) 


 8 U/L (14-59) 


 


 





 


Alkaline Phosphatase


 


 86 U/L


() 


 





 


Total Protein


 


 5.0 g/dL


(6.4-8.2) 


 





 


Albumin


 


 1.3 g/dL


(3.4-5.0) 


 





 


Albumin/Globulin Ratio  0.4 (1.0-1.7)   


 


Test


 8/12/20


17:40 8/13/20


00:26 8/13/20


04:30 8/13/20


06:58


 


Glucose (Fingerstick)


 156 mg/dL


(70-99) 157 mg/dL


(70-99) 


 192 mg/dL


(70-99)


 


White Blood Count


 


 


 12.2 x10^3/uL


(4.0-11.0) 





 


Red Blood Count


 


 


 2.58 x10^6/uL


(3.50-5.40) 





 


Hemoglobin


 


 


 7.0 g/dL


(12.0-15.5) 





 


Hematocrit


 


 


 23.4 %


(36.0-47.0) 





 


Mean Corpuscular Volume   91 fL ()  


 


Mean Corpuscular Hemoglobin   27 pg (25-35)  


 


Mean Corpuscular Hemoglobin


Concent 


 


 30 g/dL


(31-37) 





 


Red Cell Distribution Width


 


 


 20.5 %


(11.5-14.5) 





 


Platelet Count


 


 


 394 x10^3/uL


(140-400) 





 


Sodium Level


 


 


 151 mmol/L


(136-145) 





 


Potassium Level


 


 


 4.1 mmol/L


(3.5-5.1) 





 


Chloride Level


 


 


 119 mmol/L


() 





 


Carbon Dioxide Level


 


 


 21 mmol/L


(21-32) 





 


Anion Gap   11 (6-14)  


 


Blood Urea Nitrogen


 


 


 75 mg/dL


(7-20) 





 


Creatinine


 


 


 2.0 mg/dL


(0.6-1.0) 





 


Estimated GFR


(Cockcroft-Gault) 


 


 26.5 


 





 


Glucose Level


 


 


 164 mg/dL


(70-99) 





 


Calcium Level


 


 


 7.0 mg/dL


(8.5-10.1) 





 


Test


 8/13/20


11:14 


 


 





 


Glucose (Fingerstick)


 144 mg/dL


(70-99) 


 


 











Laboratory Tests








Test


 8/12/20


17:40 8/13/20


00:26 8/13/20


04:30 8/13/20


06:58


 


Glucose (Fingerstick)


 156 mg/dL


(70-99) 157 mg/dL


(70-99) 


 192 mg/dL


(70-99)


 


White Blood Count


 


 


 12.2 x10^3/uL


(4.0-11.0) 





 


Red Blood Count


 


 


 2.58 x10^6/uL


(3.50-5.40) 





 


Hemoglobin


 


 


 7.0 g/dL


(12.0-15.5) 





 


Hematocrit


 


 


 23.4 %


(36.0-47.0) 





 


Mean Corpuscular Volume   91 fL ()  


 


Mean Corpuscular Hemoglobin   27 pg (25-35)  


 


Mean Corpuscular Hemoglobin


Concent 


 


 30 g/dL


(31-37) 





 


Red Cell Distribution Width


 


 


 20.5 %


(11.5-14.5) 





 


Platelet Count


 


 


 394 x10^3/uL


(140-400) 





 


Sodium Level


 


 


 151 mmol/L


(136-145) 





 


Potassium Level


 


 


 4.1 mmol/L


(3.5-5.1) 





 


Chloride Level


 


 


 119 mmol/L


() 





 


Carbon Dioxide Level


 


 


 21 mmol/L


(21-32) 





 


Anion Gap   11 (6-14)  


 


Blood Urea Nitrogen


 


 


 75 mg/dL


(7-20) 





 


Creatinine


 


 


 2.0 mg/dL


(0.6-1.0) 





 


Estimated GFR


(Cockcroft-Gault) 


 


 26.5 


 





 


Glucose Level


 


 


 164 mg/dL


(70-99) 





 


Calcium Level


 


 


 7.0 mg/dL


(8.5-10.1) 





 


Test


 8/13/20


11:14 


 


 





 


Glucose (Fingerstick)


 144 mg/dL


(70-99) 


 


 











Microbiology


8/10/20 Blood Culture - Preliminary, Resulted


          NO GROWTH AFTER 3 DAYS


7/26/20 Gram Stain - Final, Complete


          


7/26/20 Aerobic Culture - Final, Complete


          


7/26/20 Urine Culture - Final, Complete


          


7/21/20 Gram Stain - Final, Complete


          


7/21/20 Aerobic and Anaerobic Culture - Final, Complete


          


7/19/20 Gram Stain Evaluation - Final, Complete


          


7/19/20 Respiratory Culture - Final, Complete


          


7/19/20 Antimicrobic Susceptibility - Final, Complete


          


6/30/20 Gram Stain - Final, Complete


          


6/30/20 Aerobic and Anaerobic Culture - Final, Complete


          


6/30/20 Antimicrobic Susceptibility - Final, Complete


Medications





Current Medications


Sodium Chloride 1,000 ml @  1,000 mls/hr Q1H IV  Last administered on 3/16/20at 

03:00;  Start 3/16/20 at 03:00;  Stop 3/16/20 at 03:59;  Status DC


Ondansetron HCl (Zofran) 4 mg 1X  ONCE IVP  Last administered on 3/16/20at 

03:27;  Start 3/16/20 at 03:00;  Stop 3/16/20 at 03:01;  Status DC


Morphine Sulfate (Morphine Sulfate) 4 mg 1X  ONCE IV ;  Start 3/16/20 at 03:00; 

Stop 3/16/20 at 03:01;  Status Cancel


Ketorolac Tromethamine (Toradol 30mg Vial) 30 mg 1X  ONCE IV  Last administered 

on 3/16/20at 02:54;  Start 3/16/20 at 03:00;  Stop 3/16/20 at 03:01;  Status DC


Fentanyl Citrate (Fentanyl 2ml Vial) 25 mcg 1X  ONCE IVP  Last administered on 

3/16/20at 03:23;  Start 3/16/20 at 03:30;  Stop 3/16/20 at 03:31;  Status DC


Fentanyl Citrate (Fentanyl 2ml Vial) 100 mcg STK-MED ONCE .ROUTE ;  Start 3/16/

20 at 03:18;  Stop 3/16/20 at 03:18;  Status DC


Iohexol (Omnipaque 350 Mg/ml) 90 ml 1X  ONCE IV  Last administered on 3/16/20at 

03:25;  Start 3/16/20 at 03:30;  Stop 3/16/20 at 03:31;  Status DC


Info (CONTRAST GIVEN -- Rx MONITORING) 1 each PRN DAILY  PRN MC SEE COMMENTS;  

Start 3/16/20 at 03:30;  Stop 3/18/20 at 03:29;  Status DC


Hydromorphone HCl (Dilaudid) 0.5 mg 1X  ONCE IV  Last administered on 3/16/20at 

03:55;  Start 3/16/20 at 04:30;  Stop 3/16/20 at 04:32;  Status DC


Ondansetron HCl (Zofran) 4 mg PRN Q8HRS  PRN IV NAUSEA/VOMITING 1ST CHOICE;  

Start 3/16/20 at 05:00;  Stop 3/16/20 at 09:27;  Status DC


Morphine Sulfate (Morphine Sulfate) 2 mg PRN Q2HR  PRN IV SEVERE PAIN 7-10 Last 

administered on 3/17/20at 12:26;  Start 3/16/20 at 05:00;  Stop 3/17/20 at 

14:15;  Status DC


Sodium Chloride 1,000 ml @  125 mls/hr Q8H IV  Last administered on 3/16/20at 

20:56;  Start 3/16/20 at 05:00;  Stop 3/17/20 at 04:59;  Status DC


Hydromorphone HCl (Dilaudid) 0.5 mg PRN Q3HRS  PRN IV SEVERE PAIN 7-10 Last 

administered on 3/17/20at 10:06;  Start 3/16/20 at 05:00;  Stop 3/17/20 at 

12:01;  Status DC


Piperacillin Sod/ Tazobactam Sod 4.5 gm/Sodium Chloride 100 ml @  200 mls/hr 1X 

ONCE IV  Last administered on 3/16/20at 05:44;  Start 3/16/20 at 06:00;  Stop 

3/16/20 at 06:29;  Status DC


Ondansetron HCl (Zofran) 4 mg PRN Q4HRS  PRN IV NAUSEA/VOMITING 1ST CHOICE Last 

administered on 8/12/20at 12:43;  Start 3/16/20 at 09:30


Insulin Human Lispro (HumaLOG) 0-9 UNITS Q6HRS SQ  Last administered on 

8/10/20at 19:01;  Start 3/16/20 at 09:30


Dextrose (Dextrose 50%-Water Syringe) 12.5 gm PRN Q15MIN  PRN IV SEE COMMENTS;  

Start 3/16/20 at 09:30


Pantoprazole Sodium (PROTONIX VIAL for IV PUSH) 40 mg DAILYAC IVP  Last 

administered on 8/13/20at 08:36;  Start 3/16/20 at 11:30


Prochlorperazine Edisylate (Compazine) 10 mg PRN Q6HRS  PRN IV NAUSEA/VOMITING, 

2nd CHOICE Last administered on 8/10/20at 00:42;  Start 3/16/20 at 17:45


Atenolol (Tenormin) 100 mg DAILY PO ;  Start 3/17/20 at 09:00;  Stop 3/16/20 at 

20:08;  Status DC


Metoprolol Tartrate (Lopressor Vial) 2.5 mg Q6HRS IVP  Last administered on 

3/17/20at 05:51;  Start 3/16/20 at 20:15;  Stop 3/17/20 at 10:02;  Status DC


Metoprolol Tartrate (Lopressor Vial) 5 mg Q6HRS IVP  Last administered on 

3/26/20at 00:12;  Start 3/17/20 at 10:15;  Stop 3/28/20 at 08:48;  Status DC


Hydromorphone HCl (Dilaudid) 1 mg PRN Q3HRS  PRN IV SEVERE PAIN 7-10 Last 

administered on 3/23/20at 05:13;  Start 3/17/20 at 12:00;  Stop 3/31/20 at 

00:25;  Status DC


Lidocaine HCl (Buffered Lidocaine 1%) 3 ml STK-MED ONCE .ROUTE ;  Start 3/17/20 

at 12:55;  Stop 3/17/20 at 12:56;  Status DC


Albumin Human 500 ml @  125 mls/hr 1X  ONCE IV  Last administered on 3/17/20at 

14:33;  Start 3/17/20 at 14:30;  Stop 3/17/20 at 18:32;  Status DC


Norepinephrine Bitartrate 8 mg/ Dextrose 258 ml @  17.299 mls/ hr CONT  PRN IV 

PER PROTOCOL Last administered on 4/14/20at 12:48;  Start 3/17/20 at 15:30;  

Stop 4/17/20 at 09:19;  Status DC


Sodium Chloride 1,000 ml @  125 mls/hr Q8H IV  Last administered on 3/17/20at 

21:04;  Start 3/17/20 at 16:00;  Stop 3/18/20 at 02:42;  Status DC


Albumin Human 500 ml @  125 mls/hr PRN BID  PRN IV After every 2L NSS & BP < 

90mm Last administered on 6/30/20at 16:06;  Start 3/17/20 at 16:00;  Stop 7/3/20

at 09:30;  Status DC


Iohexol (Omnipaque 300 Mg/ml) 60 ml 1X  ONCE IV  Last administered on 3/17/20at 

17:20;  Start 3/17/20 at 17:00;  Stop 3/17/20 at 17:01;  Status DC


Info (CONTRAST GIVEN -- Rx MONITORING) 1 each PRN DAILY  PRN MC SEE COMMENTS;  

Start 3/17/20 at 17:00;  Stop 3/19/20 at 16:59;  Status DC


Meropenem 1 gm/ Sodium Chloride 100 ml @  200 mls/hr Q8HRS IV  Last administered

on 3/18/20at 05:45;  Start 3/17/20 at 20:00;  Stop 3/18/20 at 08:48;  Status DC


Furosemide (Lasix) 40 mg 1X  ONCE IVP  Last administered on 3/17/20at 22:12;  

Start 3/17/20 at 22:30;  Stop 3/17/20 at 22:31;  Status DC


Calcium Chloride 1000 mg/Sodium Chloride 110 ml @  220 mls/hr 1X  ONCE IV  Last 

administered on 3/17/20at 22:11;  Start 3/17/20 at 22:30;  Stop 3/17/20 at 

22:59;  Status DC


Albuterol Sulfate (Ventolin Neb Soln) 2.5 mg 1X  ONCE NEB  Last administered on 

3/18/20at 00:56;  Start 3/17/20 at 22:30;  Stop 3/17/20 at 22:31;  Status DC


Insulin Human Regular (HumuLIN R VIAL) 5 unit 1X  ONCE IV  Last administered on 

3/17/20at 22:14;  Start 3/17/20 at 22:30;  Stop 3/17/20 at 22:31;  Status DC


Magnesium Sulfate 50 ml @ 25 mls/hr 1X  ONCE IV  Last administered on 3/18/20at 

02:57;  Start 3/18/20 at 03:00;  Stop 3/18/20 at 04:59;  Status DC


Calcium Gluconate 1000 mg/Sodium Chloride 110 ml @  220 mls/hr 1X  ONCE IV  Last

administered on 3/18/20at 02:46;  Start 3/18/20 at 03:00;  Stop 3/18/20 at 

03:29;  Status DC


Sodium Chloride 1,000 ml @  200 mls/hr Q5H IV  Last administered on 3/18/20at 

02:46;  Start 3/18/20 at 03:00;  Stop 3/18/20 at 10:21;  Status DC


Calcium Gluconate 1000 mg/Sodium Chloride 110 ml @  220 mls/hr 1X  ONCE IV  Last

administered on 3/18/20at 03:21;  Start 3/18/20 at 03:30;  Stop 3/18/20 at 

03:59;  Status DC


Sodium Bicarbonate 50 meq/Sodium Chloride 1,050 ml @  75 mls/hr Q14H IV  Last 

administered on 3/22/20at 21:10;  Start 3/18/20 at 07:30;  Stop 3/23/20 at 

10:28;  Status DC


Calcium Gluconate 2000 mg/Sodium Chloride 120 ml @  220 mls/hr 1X  ONCE IV  Last

administered on 3/18/20at 09:05;  Start 3/18/20 at 07:30;  Stop 3/18/20 at 

08:02;  Status DC


Lidocaine HCl (Xylocaine-Mpf 1% 2ml Vial) 2 ml STK-MED ONCE .ROUTE ;  Start 

3/18/20 at 08:47;  Stop 3/18/20 at 08:47;  Status DC


Meropenem 500 mg/ Sodium Chloride 50 ml @  100 mls/hr Q12HR IV  Last 

administered on 3/23/20at 21:01;  Start 3/18/20 at 18:00;  Stop 3/24/20 at 

07:58;  Status DC


Lidocaine HCl (Buffered Lidocaine 1%) 3 ml STK-MED ONCE .ROUTE ;  Start 3/18/20 

at 09:46;  Stop 3/18/20 at 09:46;  Status DC


Lidocaine HCl (Buffered Lidocaine 1%) 6 ml 1X  ONCE INJ  Last administered on 

3/18/20at 10:26;  Start 3/18/20 at 10:15;  Stop 3/18/20 at 10:16;  Status DC


Info (Tpn Per Pharmacy) 1 each PRN DAILY  PRN MC SEE COMMENTS Last administered 

on 7/26/20at 08:31;  Start 3/18/20 at 12:00;  Stop 7/26/20 at 10:41;  Status DC


Sodium Chloride 1,000 ml @  1,000 mls/hr Q1H PRN IV hypotension;  Start 3/18/20 

at 12:07;  Stop 3/18/20 at 18:06;  Status DC


Diphenhydramine HCl (Benadryl) 25 mg 1X PRN  PRN IV ITCHING;  Start 3/18/20 at 

12:15;  Stop 3/19/20 at 12:14;  Status DC


Diphenhydramine HCl (Benadryl) 25 mg 1X PRN  PRN IV ITCHING;  Start 3/18/20 at 

12:15;  Stop 3/19/20 at 12:14;  Status DC


Sodium Chloride 1,000 ml @  400 mls/hr Q2H30M PRN IV PATENCY;  Start 3/18/20 at 

12:07;  Stop 3/19/20 at 00:06;  Status DC


Info (PHARMACY MONITORING -- do not chart) 1 each PRN DAILY  PRN MC SEE 

COMMENTS;  Start 3/18/20 at 12:15;  Stop 3/20/20 at 08:13;  Status DC


Sodium Chloride 90 meq/Calcium Gluconate 10 meq/ Multivitamins 10 ml/Chromium/ 

Copper/Manganese/ Seleni/Zn 1 ml/ Total Parenteral Nutrition/Amino 

Acids/Dextrose/ Fat Emulsion Intravenous 55.005 ml  @ 2.292 mls/hr TPN  CONT IV 

;  Start 3/18/20 at 22:00;  Stop 3/18/20 at 12:33;  Status DC


Info (Tpn Per Pharmacy) 1 each PRN DAILY  PRN MC SEE COMMENTS;  Start 3/18/20 at

12:30;  Status UNV


Sodium Chloride 90 meq/Calcium Gluconate 10 meq/ Multivitamins 10 ml/Chromium/ 

Copper/Manganese/ Seleni/Zn 0.5 ml/ Total Parenteral Nutrition/Amino 

Acids/Dextrose/ Fat Emulsion Intravenous 1,512 ml @  63 mls/hr TPN  CONT IV  

Last administered on 3/18/20at 22:06;  Start 3/18/20 at 22:00;  Stop 3/19/20 at 

21:59;  Status DC


Calcium Carbonate/ Glycine (Tums) 500 mg PRN AFTMEALHC  PRN PO INDIGESTION;  

Start 3/18/20 at 17:45;  Stop 5/13/20 at 10:25;  Status DC


Calcium Gluconate (Calcium Gluconate) 2,000 mg 1X  ONCE IVP  Last administered 

on 3/19/20at 02:19;  Start 3/19/20 at 02:15;  Stop 3/19/20 at 02:16;  Status DC


Calcium Chloride 3000 mg/Sodium Chloride 1,030 ml @  50 mls/hr [id number] IV  Last 

administered on 3/21/20at 02:17;  Start 3/19/20 at 08:00;  Stop 3/21/20 at 

15:23;  Status DC


Lorazepam (Ativan Inj) 1 mg PRN Q4HRS  PRN IVP ANXIETY / AGITATION, 2nd choic 

Last administered on 4/17/20at 03:51;  Start 3/19/20 at 09:00;  Stop 4/17/20 at 

09:19;  Status DC


Sodium Chloride 1,000 ml @  1,000 mls/hr Q1H PRN IV hypotension;  Start 3/19/20 

at 08:56;  Stop 3/19/20 at 14:55;  Status DC


Albumin Human 200 ml @  200 mls/hr 1X PRN  PRN IV Hypotension;  Start 3/19/20 at

09:00;  Stop 3/19/20 at 14:59;  Status DC


Diphenhydramine HCl (Benadryl) 25 mg 1X PRN  PRN IV ITCHING;  Start 3/19/20 at 

09:00;  Stop 3/20/20 at 08:59;  Status DC


Diphenhydramine HCl (Benadryl) 25 mg 1X PRN  PRN IV ITCHING;  Start 3/19/20 at 

09:00;  Stop 3/20/20 at 08:59;  Status DC


Sodium Chloride 1,000 ml @  400 mls/hr Q2H30M PRN IV PATENCY;  Start 3/19/20 at 

08:56;  Stop 3/19/20 at 20:55;  Status DC


Info (PHARMACY MONITORING -- do not chart) 1 each PRN DAILY  PRN MC SEE 

COMMENTS;  Start 3/19/20 at 09:00;  Status UNV


Info (PHARMACY MONITORING -- do not chart) 1 each PRN DAILY  PRN MC SEE 

COMMENTS;  Start 3/19/20 at 09:00;  Stop 3/20/20 at 08:13;  Status DC


Digoxin (Lanoxin) 500 mcg 1X  ONCE IV  Last administered on 3/19/20at 10:04;  

Start 3/19/20 at 10:00;  Stop 3/19/20 at 10:01;  Status DC


Digoxin (Lanoxin) 125 mcg 1X  ONCE IV  Last administered on 3/19/20at 17:10;  

Start 3/19/20 at 18:00;  Stop 3/19/20 at 18:01;  Status DC


Magnesium Sulfate 100 ml @  25 mls/hr 1X  ONCE IV  Last administered on 

3/19/20at 12:48;  Start 3/19/20 at 13:00;  Stop 3/19/20 at 16:59;  Status DC


Sodium Chloride 90 meq/Magnesium Sulfate 10 meq/ Calcium Gluconate 20 meq/ 

Multivitamins 10 ml/Chromium/ Copper/Manganese/ Seleni/Zn 0.5 ml/ Total 

Parenteral Nutrition/Amino Acids/Dextrose/ Fat Emulsion Intravenous 1,512 ml @  

63 mls/hr TPN  CONT IV  Last administered on 3/19/20at 22:25;  Start 3/19/20 at 

22:00;  Stop 3/20/20 at 21:59;  Status DC


Sodium Chloride 1,000 ml @  1,000 mls/hr Q1H PRN IV hypotension;  Start 3/20/20 

at 08:05;  Stop 3/20/20 at 14:04;  Status DC


Albumin Human 200 ml @  200 mls/hr 1X  ONCE IV  Last administered on 3/20/20at 

08:57;  Start 3/20/20 at 08:15;  Stop 3/20/20 at 09:14;  Status DC


Diphenhydramine HCl (Benadryl) 25 mg 1X PRN  PRN IV ITCHING;  Start 3/20/20 at 

08:15;  Stop 3/21/20 at 08:14;  Status DC


Diphenhydramine HCl (Benadryl) 25 mg 1X PRN  PRN IV ITCHING;  Start 3/20/20 at 

08:15;  Stop 3/21/20 at 08:14;  Status DC


Sodium Chloride 1,000 ml @  400 mls/hr Q2H30M PRN IV PATENCY;  Start 3/20/20 at 

08:05;  Stop 3/20/20 at 20:04;  Status DC


Info (PHARMACY MONITORING -- do not chart) 1 each PRN DAILY  PRN MC SEE 

COMMENTS;  Start 3/20/20 at 08:15;  Stop 3/24/20 at 07:57;  Status DC


Sodium Chloride 90 meq/Potassium Chloride 15 meq/ Potassium Phosphate 10 mmol/ 

Magnesium Sulfate 10 meq/Calcium Gluconate 20 meq/ Multivitamins 10 ml/Chromium/

Copper/Manganese/ Seleni/Zn 0.5 ml/ Total Parenteral Nutrition/Amino 

Acids/Dextrose/ Fat Emulsion Intravenous 1,512 ml @  63 mls/hr TPN  CONT IV  

Last administered on 3/20/20at 21:01;  Start 3/20/20 at 22:00;  Stop 3/21/20 at 

21:59;  Status DC


Potassium Chloride/Water 100 ml @  100 mls/hr 1X  ONCE IV  Last administered on 

3/20/20at 14:09;  Start 3/20/20 at 14:00;  Stop 3/20/20 at 14:59;  Status DC


Benzocaine (Hurricaine One) 1 spray 1X  ONCE MM  Last administered on 3/20/20at 

16:38;  Start 3/20/20 at 14:30;  Stop 3/20/20 at 14:31;  Status DC


Lidocaine HCl (Glydo (Lidocaine) Jelly) 1 thomas 1X  ONCE MM  Last administered on 

3/20/20at 16:38;  Start 3/20/20 at 14:30;  Stop 3/20/20 at 14:31;  Status DC


Linezolid/Dextrose 300 ml @  300 mls/hr Q12HR IV  Last administered on 3/26/20at

21:04;  Start 3/20/20 at 20:00;  Stop 3/27/20 at 07:50;  Status DC


Acetaminophen (Tylenol) 650 mg PRN Q6HRS  PRN PO MILD PAIN / TEMP;  Start 

3/21/20 at 03:30;  Stop 3/21/20 at 03:36;  Status DC


Acetaminophen (Tylenol) 650 mg PRN Q6HRS  PRN PEG MILD PAIN / TEMP Last 

administered on 4/16/20at 19:56;  Start 3/21/20 at 03:36;  Stop 5/13/20 at 

10:25;  Status DC


Sodium Chloride 1,000 ml @  1,000 mls/hr Q1H PRN IV hypotension;  Start 3/21/20 

at 07:50;  Stop 3/21/20 at 13:49;  Status DC


Albumin Human 200 ml @  200 mls/hr 1X PRN  PRN IV Hypotension;  Start 3/21/20 at

08:00;  Stop 3/21/20 at 13:59;  Status DC


Sodium Chloride (Normal Saline Flush) 10 ml 1X PRN  PRN IV AP catheter pack;  

Start 3/21/20 at 08:00;  Stop 3/22/20 at 07:59;  Status DC


Sodium Chloride (Normal Saline Flush) 10 ml 1X PRN  PRN IV  catheter pack;  

Start 3/21/20 at 08:00;  Stop 3/22/20 at 07:59;  Status DC


Sodium Chloride 1,000 ml @  400 mls/hr Q2H30M PRN IV PATENCY;  Start 3/21/20 at 

07:50;  Stop 3/21/20 at 19:49;  Status DC


Info (PHARMACY MONITORING -- do not chart) 1 each PRN DAILY  PRN MC SEE 

COMMENTS;  Start 3/21/20 at 08:00;  Status UNV


Info (PHARMACY MONITORING -- do not chart) 1 each PRN DAILY  PRN MC SEE 

COMMENTS;  Start 3/21/20 at 08:00;  Stop 3/23/20 at 08:25;  Status DC


Sodium Chloride 90 meq/Potassium Chloride 15 meq/ Potassium Phosphate 10 mmol/ 

Magnesium Sulfate 10 meq/Calcium Gluconate 20 meq/ Multivitamins 10 ml/Chromium/

Copper/Manganese/ Seleni/Zn 0.5 ml/ Total Parenteral Nutrition/Amino 

Acids/Dextrose/ Fat Emulsion Intravenous 1,512 ml @  63 mls/hr TPN  CONT IV  

Last administered on 3/21/20at 20:57;  Start 3/21/20 at 22:00;  Stop 3/22/20 at 

21:59;  Status DC


Sodium Chloride 90 meq/Potassium Chloride 15 meq/ Potassium Phosphate 15 mmol/ 

Magnesium Sulfate 10 meq/Calcium Gluconate 20 meq/ Multivitamins 10 ml/Chromium/

Copper/Manganese/ Seleni/Zn 0.5 ml/ Total Parenteral Nutrition/Amino 

Acids/Dextrose/ Fat Emulsion Intravenous 1,512 ml @  63 mls/hr TPN  CONT IV ;  

Start 3/22/20 at 22:00;  Stop 3/22/20 at 14:16;  Status DC


Sodium Chloride 90 meq/Potassium Chloride 15 meq/ Potassium Phosphate 15 mmol/ 

Magnesium Sulfate 10 meq/Calcium Gluconate 20 meq/ Multivitamins 10 ml/Chromium/

Copper/Manganese/ Seleni/Zn 0.5 ml/ Total Parenteral Nutrition/Amino 

Acids/Dextrose/ Fat Emulsion Intravenous 1,200 ml @  50 mls/hr TPN  CONT IV ;  

Start 3/22/20 at 22:00;  Stop 3/22/20 at 14:17;  Status DC


Sodium Chloride 90 meq/Potassium Chloride 15 meq/ Potassium Phosphate 10 mmol/ 

Magnesium Sulfate 10 meq/Calcium Gluconate 20 meq/ Multivitamins 10 ml/Chromium/

Copper/Manganese/ Seleni/Zn 0.5 ml/ Total Parenteral Nutrition/Amino 

Acids/Dextrose/ Fat Emulsion Intravenous 1,200 ml @  50 mls/hr TPN  CONT IV  

Last administered on 3/22/20at 23:29;  Start 3/22/20 at 22:00;  Stop 3/23/20 at 

21:59;  Status DC


Sodium Chloride 1,000 ml @  1,000 mls/hr Q1H PRN IV hypotension;  Start 3/23/20 

at 07:28;  Stop 3/23/20 at 13:27;  Status DC


Albumin Human 200 ml @  200 mls/hr 1X  ONCE IV  Last administered on 3/23/20at 

08:51;  Start 3/23/20 at 07:30;  Stop 3/23/20 at 08:29;  Status DC


Diphenhydramine HCl (Benadryl) 25 mg 1X PRN  PRN IV ITCHING;  Start 3/23/20 at 

07:30;  Stop 3/24/20 at 07:29;  Status DC


Diphenhydramine HCl (Benadryl) 25 mg 1X PRN  PRN IV ITCHING;  Start 3/23/20 at 

07:30;  Stop 3/24/20 at 07:29;  Status DC


Sodium Chloride 1,000 ml @  400 mls/hr Q2H30M PRN IV PATENCY;  Start 3/23/20 at 

07:28;  Stop 3/23/20 at 19:27;  Status DC


Info (PHARMACY MONITORING -- do not chart) 1 each PRN DAILY  PRN MC SEE 

COMMENTS;  Start 3/23/20 at 07:30;  Stop 4/3/20 at 13:01;  Status DC


Metronidazole 100 ml @  100 mls/hr Q6HRS IV  Last administered on 4/8/20at 

06:26;  Start 3/23/20 at 08:30;  Stop 4/8/20 at 09:58;  Status DC


Micafungin Sodium 100 mg/Dextrose 100 ml @  100 mls/hr Q24H IV  Last 

administered on 4/30/20at 08:18;  Start 3/23/20 at 09:00;  Stop 4/30/20 at 

20:58;  Status DC


Propofol 0 ml @ As Directed STK-MED ONCE IV ;  Start 3/23/20 at 07:53;  Stop 

3/23/20 at 07:53;  Status DC


Etomidate (Amidate) 20 mg STK-MED ONCE IV ;  Start 3/23/20 at 07:53;  Stop 3/

23/20 at 07:54;  Status DC


Midazolam HCl (Versed) 5 mg STK-MED ONCE .ROUTE ;  Start 3/23/20 at 07:57;  Stop

3/23/20 at 07:57;  Status DC


Fentanyl Citrate 30 ml @ 0 mls/hr CONT  PRN IV SEE PROTOCOL Last administered on

4/17/20at 06:12;  Start 3/23/20 at 08:15;  Stop 4/17/20 at 09:19;  Status DC


Artificial Tears (Artificial Tears) 1 drop PRN Q1HR  PRN OU DRY EYE, 1st choice;

 Start 3/23/20 at 08:15;  Stop 4/29/20 at 05:31;  Status DC


Midazolam HCl 50 mg/Sodium Chloride 50 ml @ 0 mls/hr CONT  PRN IV SEE PROTOCOL 

Last administered on 3/26/20at 22:39;  Start 3/23/20 at 08:15;  Stop 3/28/20 at 

15:59;  Status DC


Etomidate (Amidate) 8 mg 1X  ONCE IV  Last administered on 3/23/20at 08:33;  

Start 3/23/20 at 08:30;  Stop 3/23/20 at 08:31;  Status DC


Succinylcholine Chloride (Anectine) 120 mg 1X  ONCE IV  Last administered on 

3/23/20at 08:34;  Start 3/23/20 at 08:30;  Stop 3/23/20 at 08:31;  Status DC


Midazolam HCl (Versed) 5 mg 1X  ONCE IV ;  Start 3/23/20 at 08:30;  Stop 3/23/20

at 08:31;  Status DC


Potassium Chloride 15 meq/ Bicarbonate Dialysis Soln w/ out KCl 5,007.5 ml  @ 

1,000 mls/ hr Q5H1M IV  Last administered on 3/24/20at 11:11;  Start 3/23/20 at 

12:00;  Stop 3/24/20 at 11:15;  Status DC


Potassium Chloride 15 meq/ Bicarbonate Dialysis Soln w/ out KCl 5,007.5 ml  @ 

1,000 mls/ hr Q5H1M IV  Last administered on 3/24/20at 11:12;  Start 3/23/20 at 

12:00;  Stop 3/24/20 at 11:17;  Status DC


Potassium Chloride 15 meq/ Bicarbonate Dialysis Soln w/ out KCl 5,007.5 ml  @ 

1,000 mls/ hr Q5H1M IV  Last administered on 3/24/20at 11:11;  Start 3/23/20 at 

12:00;  Stop 3/24/20 at 11:19;  Status DC


Sodium Chloride 90 meq/Potassium Chloride 15 meq/ Potassium Phosphate 10 mmol/ 

Magnesium Sulfate 10 meq/Calcium Gluconate 20 meq/ Multivitamins 10 ml/Chromium/

Copper/Manganese/ Seleni/Zn 0.5 ml/ Total Parenteral Nutrition/Amino 

Acids/Dextrose/ Fat Emulsion Intravenous 1,400 ml @  58.333 mls/ hr TPN  CONT IV

 Last administered on 3/23/20at 21:42;  Start 3/23/20 at 22:00;  Stop 3/24/20 at

21:59;  Status DC


Heparin Sodium (Porcine) (Heparin Sodium) 5,000 unit Q8HRS SQ  Last administered

on 3/28/20at 05:55;  Start 3/23/20 at 15:00;  Stop 3/28/20 at 13:28;  Status DC


Meropenem 500 mg/ Sodium Chloride 50 ml @  100 mls/hr Q6HRS IV  Last 

administered on 3/25/20at 06:00;  Start 3/24/20 at 09:00;  Stop 3/25/20 at 

07:29;  Status DC


Potassium Phosphate 20 mmol/ Sodium Chloride 106.6667 ml @  51.667 m... 1X  ONCE

IV  Last administered on 3/24/20at 11:22;  Start 3/24/20 at 10:15;  Stop 3/24/20

at 12:18;  Status DC


Acetaminophen (Tylenol Supp) 650 mg PRN Q6HRS  PRN AL MILD PAIN / TEMP > 100.3'F

Last administered on 8/10/20at 15:43;  Start 3/24/20 at 10:30


Potassium Chloride/Water 100 ml @  100 mls/hr Q1H IV  Last administered on 

3/24/20at 12:12;  Start 3/24/20 at 11:00;  Stop 3/24/20 at 12:59;  Status DC


Potassium Chloride 20 meq/ Bicarbonate Dialysis Soln w/ out KCl 5,010 ml @  

1,000 mls/hr Q5H1M IV  Last administered on 3/25/20at 08:48;  Start 3/24/20 at 

12:00;  Stop 3/25/20 at 13:03;  Status DC


Potassium Chloride 20 meq/ Bicarbonate Dialysis Soln w/ out KCl 5,010 ml @  

1,000 mls/hr Q5H1M IV  Last administered on 3/29/20at 14:52;  Start 3/24/20 at 

11:30;  Stop 3/29/20 at 19:59;  Status DC


Potassium Chloride 20 meq/ Bicarbonate Dialysis Soln w/ out KCl 5,010 ml @  

1,000 mls/hr Q5H1M IV  Last administered on 3/29/20at 14:53;  Start 3/24/20 at 

11:30;  Stop 3/29/20 at 19:59;  Status DC


Sodium Chloride 90 meq/Potassium Chloride 15 meq/ Potassium Phosphate 15 mmol/ 

Magnesium Sulfate 10 meq/Calcium Gluconate 15 meq/ Multivitamins 10 ml/Chromium/

Copper/Manganese/ Seleni/Zn 0.5 ml/ Total Parenteral Nutrition/Amino 

Acids/Dextrose/ Fat Emulsion Intravenous 1,400 ml @  58.333 mls/ hr TPN  CONT IV

 Last administered on 3/24/20at 22:17;  Start 3/24/20 at 22:00;  Stop 3/25/20 at

21:59;  Status DC


Cefepime HCl (Maxipime) 2 gm Q12HR IVP  Last administered on 4/7/20at 20:56;  

Start 3/25/20 at 09:00;  Stop 4/8/20 at 09:58;  Status DC


Daptomycin 500 mg/ Sodium Chloride 50 ml @  100 mls/hr Q48H IV  Last 

administered on 4/10/20at 09:57;  Start 3/25/20 at 08:30;  Stop 4/10/20 at 1

0:07;  Status DC


Lidocaine HCl (Buffered Lidocaine 1%) 3 ml 1X  ONCE INJ  Last administered on 

3/25/20at 10:27;  Start 3/25/20 at 10:30;  Stop 3/25/20 at 10:31;  Status DC


Potassium Phosphate 20 mmol/ Sodium Chloride 106.6667 ml @  51.667 m... 1X  ONCE

IV  Last administered on 3/25/20at 12:51;  Start 3/25/20 at 13:00;  Stop 3/25/20

at 15:03;  Status DC


Sodium Chloride 90 meq/Potassium Chloride 15 meq/ Potassium Phosphate 18 mmol/ 

Magnesium Sulfate 8 meq/Calcium Gluconate 15 meq/ Multivitamins 10 ml/Chromium/ 

Copper/Manganese/ Seleni/Zn 0.5 ml/ Total Parenteral Nutrition/Amino 

Acids/Dextrose/ Fat Emulsion Intravenous 1,400 ml @  58.333 mls/ hr TPN  CONT IV

 Last administered on 3/25/20at 22:16;  Start 3/25/20 at 22:00;  Stop 3/26/20 at

21:59;  Status DC


Potassium Chloride 20 meq/ Bicarbonate Dialysis Soln w/ out KCl 5,010 ml @  

1,000 mls/hr Q5H1M IV  Last administered on 3/29/20at 14:54;  Start 3/25/20 at 

16:00;  Stop 3/29/20 at 19:59;  Status DC


Multi-Ingred Cream/Lotion/Oil/ Oint (Artificial Tears Eye Ointment) 1 thomas PRN 

Q1HR  PRN OU DRY EYE, 2nd choice Last administered on 4/13/20at 08:19;  Start 

3/25/20 at 17:30;  Stop 6/3/20 at 14:39;  Status DC


Sodium Chloride 90 meq/Potassium Chloride 15 meq/ Potassium Phosphate 18 mmol/ 

Magnesium Sulfate 8 meq/Calcium Gluconate 15 meq/ Multivitamins 10 ml/Chromium/ 

Copper/Manganese/ Seleni/Zn 0.5 ml/ Total Parenteral Nutrition/Amino 

Acids/Dextrose/ Fat Emulsion Intravenous 1,400 ml @  58.333 mls/ hr TPN  CONT IV

 Last administered on 3/26/20at 22:00;  Start 3/26/20 at 22:00;  Stop 3/27/20 at

21:59;  Status DC


Albumin Human 500 ml @  125 mls/hr 1X  ONCE IV ;  Start 3/26/20 at 14:15;  Stop 

3/26/20 at 18:14;  Status DC


Sodium Chloride 90 meq/Potassium Chloride 15 meq/ Potassium Phosphate 18 mmol/ 

Magnesium Sulfate 8 meq/Calcium Gluconate 15 meq/ Multivitamins 10 ml/Chromium/ 

Copper/Manganese/ Seleni/Zn 0.5 ml/ Insulin Human Regular 10 unit/ Total Par

enteral Nutrition/Amino Acids/Dextrose/ Fat Emulsion Intravenous 1,400 ml @  

58.333 mls/ hr TPN  CONT IV  Last administered on 3/27/20at 21:43;  Start 

3/27/20 at 22:00;  Stop 3/28/20 at 21:59;  Status DC


Lidocaine HCl (Buffered Lidocaine 1%) 3 ml STK-MED ONCE .ROUTE ;  Start 3/25/20 

at 10:00;  Stop 3/27/20 at 13:57;  Status DC


Midazolam HCl 100 mg/Sodium Chloride 100 ml @ 7 mls/hr CONT  PRN IV SEE PROTOCOL

Last administered on 4/8/20at 15:35;  Start 3/28/20 at 16:00;  Stop 6/3/20 at 

14:38;  Status DC


Sodium Chloride 90 meq/Potassium Chloride 15 meq/ Potassium Phosphate 18 mmol/ 

Magnesium Sulfate 8 meq/Calcium Gluconate 15 meq/ Multivitamins 10 ml/Chromium/ 

Copper/Manganese/ Seleni/Zn 0.5 ml/ Insulin Human Regular 15 unit/ Total 

Parenteral Nutrition/Amino Acids/Dextrose/ Fat Emulsion Intravenous 1,400 ml @  

58.333 mls/ hr TPN  CONT IV  Last administered on 3/28/20at 20:34;  Start 3/28

/20 at 22:00;  Stop 3/29/20 at 21:59;  Status DC


Info ([facility] Electrolyte Protocol) 1 ea CONT PRN  PRN MC PER PROTOCOL;  Start 3/29

/20 at 13:15


Sodium Chloride 90 meq/Potassium Chloride 15 meq/ Potassium Phosphate 18 mmol/ 

Magnesium Sulfate 8 meq/Calcium Gluconate 15 meq/ Multivitamins 10 ml/Chromium/ 

Copper/Manganese/ Seleni/Zn 0.5 ml/ Insulin Human Regular 15 unit/ Total 

Parenteral Nutrition/Amino Acids/Dextrose/ Fat Emulsion Intravenous 1,400 ml @  

58.333 mls/ hr TPN  CONT IV  Last administered on 3/29/20at 22:05;  Start 

3/29/20 at 22:00;  Stop 3/30/20 at 21:59;  Status DC


Potassium Chloride 15 meq/ Bicarbonate Dialysis Soln w/ out KCl 5,007.5 ml  @ 

1,000 mls/ hr Q5H1M IV  Last administered on 4/1/20at 18:14;  Start 3/29/20 at 

20:00;  Stop 4/2/20 at 13:08;  Status DC


Potassium Chloride 15 meq/ Bicarbonate Dialysis Soln w/ out KCl 5,007.5 ml  @ 

1,000 mls/ hr Q5H1M IV  Last administered on 4/1/20at 18:14;  Start 3/29/20 at 

20:00;  Stop 4/2/20 at 13:08;  Status DC


Potassium Chloride 15 meq/ Bicarbonate Dialysis Soln w/ out KCl 5,007.5 ml  @ 

1,000 mls/ hr Q5H1M IV  Last administered on 4/1/20at 18:14;  Start 3/29/20 at 

20:00;  Stop 4/2/20 at 13:08;  Status DC


Iohexol (Omnipaque 240 Mg/ml) 30 ml 1X  ONCE PO  Last administered on 3/30/20at 

11:30;  Start 3/30/20 at 11:30;  Stop 3/30/20 at 11:33;  Status DC


Info (CONTRAST GIVEN -- Rx MONITORING) 1 each PRN DAILY  PRN MC SEE COMMENTS;  

Start 3/30/20 at 11:45;  Stop 4/1/20 at 11:44;  Status DC


Sodium Chloride 90 meq/Potassium Chloride 15 meq/ Potassium Phosphate 18 mmol/ 

Magnesium Sulfate 8 meq/Calcium Gluconate 15 meq/ Multivitamins 10 ml/Chromium/ 

Copper/Manganese/ Seleni/Zn 0.5 ml/ Insulin Human Regular 15 unit/ Total 

Parenteral Nutrition/Amino Acids/Dextrose/ Fat Emulsion Intravenous 1,400 ml @  

58.333 mls/ hr TPN  CONT IV  Last administered on 3/30/20at 21:47;  Start 

3/30/20 at 22:00;  Stop 3/31/20 at 21:59;  Status DC


Sodium Chloride 90 meq/Potassium Chloride 15 meq/ Potassium Phosphate 18 mmol/ 

Magnesium Sulfate 8 meq/Calcium Gluconate 15 meq/ Multivitamins 10 ml/Chromium/ 

Copper/Manganese/ Seleni/Zn 0.5 ml/ Insulin Human Regular 20 unit/ Total 

Parenteral Nutrition/Amino Acids/Dextrose/ Fat Emulsion Intravenous 1,400 ml @  

58.333 mls/ hr TPN  CONT IV  Last administered on 3/31/20at 21:36;  Start 

3/31/20 at 22:00;  Stop 4/1/20 at 21:59;  Status DC


Alteplase, Recombinant (Cathflo For Central Catheter Clearance) 1 mg 1X  ONCE 

INT CAT  Last administered on 3/31/20at 20:03;  Start 3/31/20 at 19:30;  Stop 

3/31/20 at 19:46;  Status DC


Alteplase, Recombinant (Cathflo For Central Catheter Clearance) 1 mg 1X  ONCE 

INT CAT  Last administered on 3/31/20at 22:05;  Start 3/31/20 at 22:00;  Stop 

3/31/20 at 22:01;  Status DC


Sodium Chloride 90 meq/Potassium Chloride 15 meq/ Potassium Phosphate 18 mmol/ 

Magnesium Sulfate 8 meq/Calcium Gluconate 15 meq/ Multivitamins 10 ml/Chromium/ 

Copper/Manganese/ Seleni/Zn 0.5 ml/ Insulin Human Regular 20 unit/ Total Lisa

ral Nutrition/Amino Acids/Dextrose/ Fat Emulsion Intravenous 1,400 ml @  58.333 

mls/ hr TPN  CONT IV  Last administered on 4/1/20at 21:30;  Start 4/1/20 at 

22:00;  Stop 4/2/20 at 21:59;  Status DC


Dexmedetomidine HCl 400 mcg/ Sodium Chloride 100 ml @ 0 mls/hr CONT  PRN IV 

ANXIETY / AGITATION Last administered on 5/30/20at 12:57;  Start 4/2/20 at 

08:15;  Stop 5/30/20 at 18:31;  Status DC


Sodium Chloride 500 ml @  500 mls/hr 1X PRN  PRN IV ELEVATED BP, SEE COMMENTS;  

Start 4/2/20 at 08:15;  Stop 8/5/20 at 09:08;  Status DC


Atropine Sulfate (ATROPINE 0.5mg SYRINGE) 0.5 mg PRN Q5MIN  PRN IV SEE COMMENTS;

 Start 4/2/20 at 08:15;  Stop 8/3/20 at 09:45;  Status DC


Furosemide (Lasix) 20 mg 1X  ONCE IVP  Last administered on 4/2/20at 08:19;  

Start 4/2/20 at 08:15;  Stop 4/2/20 at 08:16;  Status DC


Lidocaine HCl (Buffered Lidocaine 1%) 3 ml STK-MED ONCE .ROUTE ;  Start 4/2/20 

at 08:39;  Stop 4/2/20 at 08:39;  Status DC


Lidocaine HCl (Buffered Lidocaine 1%) 6 ml 1X  ONCE INJ  Last administered on 

4/2/20at 09:05;  Start 4/2/20 at 09:00;  Stop 4/2/20 at 09:06;  Status DC


Sodium Chloride 90 meq/Potassium Chloride 15 meq/ Potassium Phosphate 18 mmol/ 

Magnesium Sulfate 8 meq/Calcium Gluconate 15 meq/ Multivitamins 10 ml/Chromium/ 

Copper/Manganese/ Seleni/Zn 0.5 ml/ Insulin Human Regular 20 unit/ Total 

Parenteral Nutrition/Amino Acids/Dextrose/ Fat Emulsion Intravenous 1,400 ml @  

58.333 mls/ hr TPN  CONT IV  Last administered on 4/2/20at 22:45;  Start 4/2/20 

at 22:00;  Stop 4/3/20 at 21:59;  Status DC


Sodium Chloride 1,000 ml @  1,000 mls/hr Q1H PRN IV hypotension;  Start 4/3/20 

at 07:30;  Stop 4/3/20 at 13:29;  Status DC


Albumin Human 200 ml @  200 mls/hr 1X PRN  PRN IV Hypotension Last administered 

on 4/3/20at 09:36;  Start 4/3/20 at 07:30;  Stop 4/3/20 at 13:29;  Status DC


Sodium Chloride (Normal Saline Flush) 10 ml 1X PRN  PRN IV AP catheter pack;  

Start 4/3/20 at 07:30;  Stop 4/3/20 at 21:29;  Status DC


Sodium Chloride (Normal Saline Flush) 10 ml 1X PRN  PRN IV  catheter pack;  

Start 4/3/20 at 07:30;  Stop 4/4/20 at 07:29;  Status DC


Sodium Chloride 1,000 ml @  400 mls/hr Q2H30M PRN IV PATENCY;  Start 4/3/20 at 

07:30;  Stop 4/3/20 at 19:29;  Status DC


Info (PHARMACY MONITORING -- do not chart) 1 each PRN DAILY  PRN MC SEE 

COMMENTS;  Start 4/3/20 at 07:30;  Stop 4/3/20 at 13:02;  Status DC


Info (PHARMACY MONITORING -- do not chart) 1 each PRN DAILY  PRN MC SEE 

COMMENTS;  Start 4/3/20 at 07:30;  Stop 4/5/20 at 12:45;  Status DC


Sodium Chloride 90 meq/Potassium Chloride 15 meq/ Potassium Phosphate 10 mmol/ 

Magnesium Sulfate 8 meq/Calcium Gluconate 15 meq/ Multivitamins 10 ml/Chromium/ 

Copper/Manganese/ Seleni/Zn 0.5 ml/ Insulin Human Regular 25 unit/ Total 

Parenteral Nutrition/Amino Acids/Dextrose/ Fat Emulsion Intravenous 1,400 ml @  

58.333 mls/ hr TPN  CONT IV  Last administered on 4/3/20at 22:19;  Start 4/3/20 

at 22:00;  Stop 4/4/20 at 21:59;  Status DC


Heparin Sodium (Porcine) (Heparin Sodium) 5,000 unit Q12HR SQ  Last administered

on 4/26/20at 08:59;  Start 4/3/20 at 21:00;  Stop 4/26/20 at 10:05;  Status DC


Ondansetron HCl (Zofran) 4 mg PRN Q6HRS  PRN IV NAUSEA/VOMITING;  Start 4/6/20 

at 07:00;  Stop 4/7/20 at 06:59;  Status DC


Fentanyl Citrate (Fentanyl 2ml Vial) 25 mcg PRN Q5MIN  PRN IV MILD PAIN 1-3;  

Start 4/6/20 at 07:00;  Stop 4/7/20 at 06:59;  Status DC


Fentanyl Citrate (Fentanyl 2ml Vial) 50 mcg PRN Q5MIN  PRN IV MODERATE TO SEVERE

PAIN;  Start 4/6/20 at 07:00;  Stop 4/7/20 at 06:59;  Status DC


Ringer's Solution 1,000 ml @  30 mls/hr Q24H IV ;  Start 4/6/20 at 07:00;  Stop 

4/6/20 at 18:59;  Status DC


Lidocaine HCl (Xylocaine-Mpf 1% 2ml Vial) 2 ml PRN 1X  PRN ID PRIOR TO IV START;

 Start 4/6/20 at 07:00;  Stop 4/7/20 at 06:59;  Status DC


Prochlorperazine Edisylate (Compazine) 5 mg PACU PRN  PRN IV NAUSEA, MRX1;  

Start 4/6/20 at 07:00;  Stop 4/7/20 at 06:59;  Status DC


Sodium Chloride 1,000 ml @  1,000 mls/hr Q1H PRN IV hypotension;  Start 4/4/20 

at 09:10;  Stop 4/4/20 at 15:09;  Status DC


Albumin Human 200 ml @  200 mls/hr 1X PRN  PRN IV Hypotension Last administered 

on 4/4/20at 10:10;  Start 4/4/20 at 09:15;  Stop 4/4/20 at 15:14;  Status DC


Sodium Chloride 1,000 ml @  400 mls/hr Q2H30M PRN IV PATENCY;  Start 4/4/20 at 

09:10;  Stop 4/4/20 at 21:09;  Status DC


Info (PHARMACY MONITORING -- do not chart) 1 each PRN DAILY  PRN MC SEE 

COMMENTS;  Start 4/4/20 at 09:15;  Stop 4/5/20 at 12:45;  Status DC


Info (PHARMACY MONITORING -- do not chart) 1 each PRN DAILY  PRN MC SEE 

COMMENTS;  Start 4/4/20 at 09:15;  Stop 4/5/20 at 12:45;  Status DC


Sodium Chloride 90 meq/Potassium Chloride 15 meq/ Potassium Phosphate 10 mmol/ 

Magnesium Sulfate 8 meq/Calcium Gluconate 15 meq/ Multivitamins 10 ml/Chromium/ 

Copper/Manganese/ Seleni/Zn 0.5 ml/ Insulin Human Regular 25 unit/ Total 

Parenteral Nutrition/Amino Acids/Dextrose/ Fat Emulsion Intravenous 1,400 ml @  

58.333 mls/ hr TPN  CONT IV  Last administered on 4/4/20at 22:10;  Start 4/4/20 

at 22:00;  Stop 4/5/20 at 21:59;  Status DC


Magnesium Sulfate 50 ml @ 25 mls/hr PRN DAILY  PRN IV for Mag < 1.7 on am labs 

Last administered on 6/18/20at 10:57;  Start 4/5/20 at 09:15


Sodium Chloride 90 meq/Potassium Chloride 15 meq/ Potassium Phosphate 10 mmol/ 

Magnesium Sulfate 8 meq/Calcium Gluconate 15 meq/ Multivitamins 10 ml/Chromium/ 

Copper/Manganese/ Seleni/Zn 0.5 ml/ Insulin Human Regular 25 unit/ Total 

Parenteral Nutrition/Amino Acids/Dextrose/ Fat Emulsion Intravenous 1,400 ml @  

58.333 mls/ hr TPN  CONT IV  Last administered on 4/5/20at 21:20;  Start 4/5/20 

at 22:00;  Stop 4/6/20 at 21:59;  Status DC


Sodium Chloride 1,000 ml @  1,000 mls/hr Q1H PRN IV hypotension;  Start 4/5/20 

at 12:23;  Stop 4/5/20 at 18:22;  Status DC


Albumin Human 200 ml @  200 mls/hr 1X  ONCE IV  Last administered on 4/5/20at 

13:34;  Start 4/5/20 at 12:30;  Stop 4/5/20 at 13:29;  Status DC


Diphenhydramine HCl (Benadryl) 25 mg 1X PRN  PRN IV ITCHING;  Start 4/5/20 at 

12:30;  Stop 4/6/20 at 12:29;  Status DC


Diphenhydramine HCl (Benadryl) 25 mg 1X PRN  PRN IV ITCHING;  Start 4/5/20 at 

12:30;  Stop 4/6/20 at 12:29;  Status DC


Info (PHARMACY MONITORING -- do not chart) 1 each PRN DAILY  PRN MC SEE 

COMMENTS;  Start 4/5/20 at 12:30;  Status Cancel


Bupivacaine HCl/ Epinephrine Bitart (Sensorcain-Epi 0.5%-1:[id number] Mpf) 30 ml 

STK-MED ONCE .ROUTE  Last administered on 4/6/20at 11:44;  Start 4/6/20 at 

11:00;  Stop 4/6/20 at 11:01;  Status DC


Cellulose (Surgicel Fibrillar 1x2) 1 each STK-MED ONCE .ROUTE ;  Start 4/6/20 at

11:00;  Stop 4/6/20 at 11:01;  Status DC


Sodium Chloride 90 meq/Potassium Chloride 15 meq/ Potassium Phosphate 10 mmol/ 

Magnesium Sulfate 12 meq/Calcium Gluconate 15 meq/ Multivitamins 10 ml/Chromium/

Copper/Manganese/ Seleni/Zn 0.5 ml/ Insulin Human Regular 25 unit/ Total 

Parenteral Nutrition/Amino Acids/Dextrose/ Fat Emulsion Intravenous 1,400 ml @  

58.333 mls/ hr TPN  CONT IV  Last administered on 4/6/20at 22:24;  Start 4/6/20 

at 22:00;  Stop 4/7/20 at 21:59;  Status DC


Propofol 20 ml @ As Directed STK-MED ONCE IV ;  Start 4/6/20 at 11:07;  Stop 

4/6/20 at 11:07;  Status DC


Cellulose (Surgicel Hemostat 4x8) 1 each STK-MED ONCE .ROUTE  Last administered 

on 4/6/20at 11:44;  Start 4/6/20 at 11:55;  Stop 4/6/20 at 11:56;  Status DC


Sevoflurane (Ultane) 60 ml STK-MED ONCE IH ;  Start 4/6/20 at 12:46;  Stop 

4/6/20 at 12:46;  Status DC


Sodium Chloride 1,000 ml @  1,000 mls/hr Q1H PRN IV hypotension;  Start 4/6/20 

at 13:51;  Stop 4/6/20 at 19:50;  Status DC


Albumin Human 200 ml @  200 mls/hr 1X PRN  PRN IV Hypotension Last administered 

on 4/6/20at 14:51;  Start 4/6/20 at 14:00;  Stop 4/6/20 at 19:59;  Status DC


Diphenhydramine HCl (Benadryl) 25 mg 1X PRN  PRN IV ITCHING;  Start 4/6/20 at 

14:00;  Stop 4/7/20 at 13:59;  Status DC


Diphenhydramine HCl (Benadryl) 25 mg 1X PRN  PRN IV ITCHING;  Start 4/6/20 at 

14:00;  Stop 4/7/20 at 13:59;  Status DC


Sodium Chloride 1,000 ml @  400 mls/hr Q2H30M PRN IV PATENCY;  Start 4/6/20 at 

13:51;  Stop 4/7/20 at 01:50;  Status DC


Info (PHARMACY MONITORING -- do not chart) 1 each PRN DAILY  PRN MC SEE 

COMMENTS;  Start 4/6/20 at 14:00;  Stop 4/9/20 at 08:16;  Status DC


Heparin Sodium (Porcine) (Hep Lock Adult) 500 unit STK-MED ONCE IVP ;  Start 

4/7/20 at 09:29;  Stop 4/7/20 at 09:30;  Status DC


Sodium Chloride 1,000 ml @  1,000 mls/hr Q1H PRN IV hypotension;  Start 4/7/20 

at 10:43;  Stop 4/7/20 at 16:42;  Status DC


Sodium Chloride 1,000 ml @  400 mls/hr Q2H30M PRN IV PATENCY;  Start 4/7/20 at 

10:43;  Stop 4/7/20 at 22:42;  Status DC


Info (PHARMACY MONITORING -- do not chart) 1 each PRN DAILY  PRN MC SEE 

COMMENTS;  Start 4/7/20 at 10:45;  Status UNV


Info (PHARMACY MONITORING -- do not chart) 1 each PRN DAILY  PRN MC SEE 

COMMENTS;  Start 4/7/20 at 10:45;  Status UNV


Sodium Chloride 90 meq/Potassium Chloride 15 meq/ Magnesium Sulfate 12 

meq/Calcium Gluconate 15 meq/ Multivitamins 10 ml/Chromium/ Copper/Manganese/ 

Seleni/Zn 0.5 ml/ Insulin Human Regular 25 unit/ Total Parenteral 

Nutrition/Amino Acids/Dextrose/ Fat Emulsion Intravenous 1,400 ml @  58.333 mls/

hr TPN  CONT IV  Last administered on 4/7/20at 22:13;  Start 4/7/20 at 22:00;  

Stop 4/8/20 at 21:59;  Status DC


Sodium Chloride 1,000 ml @  1,000 mls/hr Q1H PRN IV hypotension;  Start 4/8/20 

at 07:50;  Stop 4/8/20 at 13:49;  Status DC


Albumin Human 200 ml @  200 mls/hr 1X  ONCE IV ;  Start 4/8/20 at 08:00;  Stop 

4/8/20 at 08:53;  Status DC


Diphenhydramine HCl (Benadryl) 25 mg 1X PRN  PRN IV ITCHING;  Start 4/8/20 at 

08:00;  Stop 4/9/20 at 07:59;  Status DC


Diphenhydramine HCl (Benadryl) 25 mg 1X PRN  PRN IV ITCHING;  Start 4/8/20 at 

08:00;  Stop 4/9/20 at 07:59;  Status DC


Info (PHARMACY MONITORING -- do not chart) 1 each PRN DAILY  PRN MC SEE 

COMMENTS;  Start 4/8/20 at 08:00;  Stop 4/9/20 at 08:16;  Status DC


Albumin Human 50 ml @ 50 mls/hr 1X  ONCE IV ;  Start 4/8/20 at 08:53;  Stop 

4/8/20 at 08:56;  Status DC


Albumin Human 200 ml @  50 mls/hr PRN 1X  PRN IV HYPOTENSION Last administered 

on 4/14/20at 11:54;  Start 4/8/20 at 09:00;  Stop 5/21/20 at 11:14;  Status DC


Meropenem 500 mg/ Sodium Chloride 50 ml @  100 mls/hr Q12H IV  Last administered

on 4/28/20at 10:45;  Start 4/8/20 at 10:00;  Stop 4/28/20 at 12:37;  Status DC


Sodium Chloride 90 meq/Magnesium Sulfate 12 meq/ Calcium Gluconate 15 meq/ 

Multivitamins 10 ml/Chromium/ Copper/Manganese/ Seleni/Zn 0.5 ml/ Insulin Human 

Regular 25 unit/ Total Parenteral Nutrition/Amino Acids/Dextrose/ Fat Emulsion 

Intravenous 1,400 ml @  58.333 mls/ hr TPN  CONT IV  Last administered on 

4/8/20at 21:41;  Start 4/8/20 at 22:00;  Stop 4/9/20 at 21:59;  Status DC


Sodium Chloride 1,000 ml @  1,000 mls/hr Q1H PRN IV hypotension;  Start 4/9/20 

at 07:58;  Stop 4/9/20 at 13:57;  Status DC


Albumin Human 200 ml @  200 mls/hr 1X PRN  PRN IV Hypotension Last administered 

on 4/9/20at 09:30;  Start 4/9/20 at 08:00;  Stop 4/9/20 at 13:59;  Status DC


Sodium Chloride 1,000 ml @  400 mls/hr Q2H30M PRN IV PATENCY;  Start 4/9/20 at 

07:58;  Stop 4/9/20 at 19:57;  Status DC


Info (PHARMACY MONITORING -- do not chart) 1 each PRN DAILY  PRN MC SEE 

COMMENTS;  Start 4/9/20 at 08:00;  Status Cancel


Info (PHARMACY MONITORING -- do not chart) 1 each PRN DAILY  PRN MC SEE 

COMMENTS;  Start 4/9/20 at 08:15;  Status UNV


Sodium Chloride 90 meq/Potassium Phosphate 5 mmol/ Magnesium Sulfate 12 

meq/Calcium Gluconate 15 meq/ Multivitamins 10 ml/Chromium/ Copper/Manganese/ 

Seleni/Zn 0.5 ml/ Insulin Human Regular 30 unit/ Total Parenteral 

Nutrition/Amino Acids/Dextrose/ Fat Emulsion Intravenous 1,400 ml @  58.333 mls/

hr TPN  CONT IV  Last administered on 4/9/20at 22:08;  Start 4/9/20 at 22:00;  

Stop 4/10/20 at 21:59;  Status DC


Linezolid/Dextrose 300 ml @  300 mls/hr Q12HR IV  Last administered on 4/20/20at

20:40;  Start 4/10/20 at 11:00;  Stop 4/21/20 at 08:10;  Status DC


Sodium Chloride 90 meq/Potassium Phosphate 15 mmol/ Magnesium Sulfate 12 

meq/Calcium Gluconate 15 meq/ Multivitamins 10 ml/Chromium/ Copper/Manganese/ 

Seleni/Zn 0.5 ml/ Insulin Human Regular 30 unit/ Total Parenteral Nutrition/A

rajesh Acids/Dextrose/ Fat Emulsion Intravenous 1,400 ml @  58.333 mls/ hr TPN  

CONT IV  Last administered on 4/10/20at 21:49;  Start 4/10/20 at 22:00;  Stop 

4/11/20 at 21:59;  Status DC


Sodium Chloride 90 meq/Potassium Phosphate 15 mmol/ Magnesium Sulfate 12 

meq/Calcium Gluconate 15 meq/ Multivitamins 10 ml/Chromium/ Copper/Manganese/ 

Seleni/Zn 0.5 ml/ Insulin Human Regular 40 unit/ Total Parenteral 

Nutrition/Amino Acids/Dextrose/ Fat Emulsion Intravenous 1,400 ml @  58.333 mls/

hr TPN  CONT IV  Last administered on 4/11/20at 21:21;  Start 4/11/20 at 22:00; 

Stop 4/12/20 at 21:59;  Status DC


Sodium Chloride 1,000 ml @  1,000 mls/hr Q1H PRN IV hypotension;  Start 4/11/20 

at 13:26;  Stop 4/11/20 at 19:25;  Status DC


Albumin Human 200 ml @  200 mls/hr 1X PRN  PRN IV Hypotension Last administered 

on 4/11/20at 15:00;  Start 4/11/20 at 13:30;  Stop 4/11/20 at 19:29;  Status DC


Sodium Chloride (Normal Saline Flush) 10 ml 1X PRN  PRN IV AP catheter pack;  

Start 4/11/20 at 13:30;  Stop 4/12/20 at 13:29;  Status DC


Sodium Chloride (Normal Saline Flush) 10 ml 1X PRN  PRN IV  catheter pack;  

Start 4/11/20 at 13:30;  Stop 4/12/20 at 13:29;  Status DC


Sodium Chloride 1,000 ml @  400 mls/hr Q2H30M PRN IV PATENCY;  Start 4/11/20 at 

13:26;  Stop 4/12/20 at 01:25;  Status DC


Info (PHARMACY MONITORING -- do not chart) 1 each PRN DAILY  PRN MC SEE 

COMMENTS;  Start 4/11/20 at 13:30;  Stop 4/11/20 at 13:33;  Status DC


Info (PHARMACY MONITORING -- do not chart) 1 each PRN DAILY  PRN MC SEE 

COMMENTS;  Start 4/11/20 at 13:30;  Stop 4/11/20 at 13:34;  Status DC


Sodium Chloride 90 meq/Potassium Phosphate 19 mmol/ Magnesium Sulfate 12 

meq/Calcium Gluconate 15 meq/ Multivitamins 10 ml/Chromium/ Copper/Manganese/ 

Seleni/Zn 0.5 ml/ Insulin Human Regular 40 unit/ Total Parenteral Nutrition/Am

yas Acids/Dextrose/ Fat Emulsion Intravenous 1,400 ml @  58.333 mls/ hr TPN  

CONT IV  Last administered on 4/12/20at 21:54;  Start 4/12/20 at 22:00;  Stop 

4/13/20 at 21:59;  Status DC


Sodium Chloride 1,000 ml @  1,000 mls/hr Q1H PRN IV hypotension;  Start 4/13/20 

at 09:35;  Stop 4/13/20 at 15:34;  Status DC


Albumin Human 200 ml @  200 mls/hr 1X PRN  PRN IV Hypotension;  Start 4/13/20 at

09:45;  Stop 4/13/20 at 15:44;  Status DC


Diphenhydramine HCl (Benadryl) 25 mg 1X PRN  PRN IV ITCHING;  Start 4/13/20 at 0

9:45;  Stop 4/14/20 at 09:44;  Status DC


Diphenhydramine HCl (Benadryl) 25 mg 1X PRN  PRN IV ITCHING;  Start 4/13/20 at 

09:45;  Stop 4/14/20 at 09:44;  Status DC


Sodium Chloride 1,000 ml @  400 mls/hr Q2H30M PRN IV PATENCY;  Start 4/13/20 at 

09:35;  Stop 4/13/20 at 21:34;  Status DC


Info (PHARMACY MONITORING -- do not chart) 1 each PRN DAILY  PRN MC SEE 

COMMENTS;  Start 4/13/20 at 09:45;  Status Cancel


Sodium Chloride 100 meq/Potassium Phosphate 19 mmol/ Magnesium Sulfate 12 

meq/Calcium Gluconate 15 meq/ Multivitamins 10 ml/Chromium/ Copper/Manganese/ 

Seleni/Zn 0.5 ml/ Insulin Human Regular 40 unit/ Potassium Chloride 20 meq/ 

Total Parenteral Nutrition/Amino Acids/Dextrose/ Fat Emulsion Intravenous 1,400 

ml @  58.333 mls/ hr TPN  CONT IV  Last administered on 4/13/20at 22:02;  Start 

4/13/20 at 22:00;  Stop 4/14/20 at 21:59;  Status DC


Furosemide (Lasix) 40 mg 1X  ONCE IVP  Last administered on 4/13/20at 14:39;  

Start 4/13/20 at 14:30;  Stop 4/13/20 at 14:31;  Status DC


Metronidazole 100 ml @  100 mls/hr Q8HRS IV  Last administered on 4/21/20at 

06:04;  Start 4/14/20 at 10:00;  Stop 4/21/20 at 08:10;  Status DC


Sodium Chloride 1,000 ml @  1,000 mls/hr Q1H PRN IV hypotension;  Start 4/14/20 

at 08:00;  Stop 4/14/20 at 13:59;  Status DC


Albumin Human 200 ml @  200 mls/hr 1X PRN  PRN IV Hypotension;  Start 4/14/20 at

08:00;  Stop 4/14/20 at 13:59;  Status DC


Sodium Chloride 1,000 ml @  400 mls/hr Q2H30M PRN IV PATENCY;  Start 4/14/20 at 

08:00;  Stop 4/14/20 at 19:59;  Status DC


Info (PHARMACY MONITORING -- do not chart) 1 each PRN DAILY  PRN MC SEE 

COMMENTS;  Start 4/14/20 at 11:30;  Status UNV


Info (PHARMACY MONITORING -- do not chart) 1 each PRN DAILY  PRN MC SEE 

COMMENTS;  Start 4/14/20 at 11:30;  Stop 4/16/20 at 12:13;  Status DC


Sodium Chloride 100 meq/Potassium Phosphate 19 mmol/ Magnesium Sulfate 12 

meq/Calcium Gluconate 15 meq/ Multivitamins 10 ml/Chromium/ Copper/Manganese/ 

Seleni/Zn 0.5 ml/ Insulin Human Regular 40 unit/ Potassium Chloride 20 meq/ 

Total Parenteral Nutrition/Amino Acids/Dextrose/ Fat Emulsion Intravenous 1,400 

ml @  58.333 mls/ hr TPN  CONT IV  Last administered on 4/14/20at 21:52;  Start 

4/14/20 at 22:00;  Stop 4/15/20 at 21:59;  Status DC


Sodium Chloride (Normal Saline Flush) 10 ml QSHIFT  PRN IV AFTER MEDS AND BLOOD 

DRAWS;  Start 4/14/20 at 15:00;  Stop 5/12/20 at 11:27;  Status DC


Sodium Chloride (Normal Saline Flush) 10 ml PRN Q5MIN  PRN IV AFTER MEDS AND 

BLOOD DRAWS;  Start 4/14/20 at 15:00;  Stop 8/1/20 at 10:12;  Status DC


Sodium Chloride (Normal Saline Flush) 20 ml PRN Q5MIN  PRN IV AFTER MEDS AND 

BLOOD DRAWS;  Start 4/14/20 at 15:00


Sodium Chloride 100 meq/Potassium Phosphate 19 mmol/ Magnesium Sulfate 12 meq/C

alcium Gluconate 15 meq/ Multivitamins 10 ml/Chromium/ Copper/Manganese/ 

Seleni/Zn 0.5 ml/ Insulin Human Regular 40 unit/ Potassium Chloride 20 meq/ 

Total Parenteral Nutrition/Amino Acids/Dextrose/ Fat Emulsion Intravenous 1,400 

ml @  58.333 mls/ hr TPN  CONT IV  Last administered on 4/15/20at 21:20;  Start 

4/15/20 at 22:00;  Stop 4/16/20 at 21:59;  Status DC


Lidocaine HCl (Buffered Lidocaine 1%) 3 ml STK-MED ONCE .ROUTE ;  Start 4/15/20 

at 13:16;  Stop 4/15/20 at 13:16;  Status DC


Lidocaine HCl (Buffered Lidocaine 1%) 6 ml 1X  ONCE INJ  Last administered on 

4/15/20at 13:45;  Start 4/15/20 at 13:30;  Stop 4/15/20 at 13:31;  Status DC


Albumin Human 100 ml @  100 mls/hr 1X  ONCE IV  Last administered on 4/15/20at 

15:41;  Start 4/15/20 at 15:00;  Stop 4/15/20 at 15:59;  Status DC


Albumin Human 50 ml @ 50 mls/hr 1X  ONCE IV  Last administered on 4/15/20at 

15:00;  Start 4/15/20 at 15:00;  Stop 4/15/20 at 15:59;  Status DC


Info (PHARMACY MONITORING -- do not chart) 1 each PRN DAILY  PRN MC SEE 

COMMENTS;  Start 4/16/20 at 11:30;  Status Cancel


Info (PHARMACY MONITORING -- do not chart) 1 each PRN DAILY  PRN MC SEE 

COMMENTS;  Start 4/16/20 at 11:30;  Status UNV


Sodium Chloride 100 meq/Potassium Phosphate 10 mmol/ Magnesium Sulfate 12 

meq/Calcium Gluconate 15 meq/ Multivitamins 10 ml/Chromium/ Copper/Manganese/ 

Seleni/Zn 0.5 ml/ Insulin Human Regular 35 unit/ Potassium Chloride 20 meq/ 

Total Parenteral Nutrition/Amino Acids/Dextrose/ Fat Emulsion Intravenous 1,400 

ml @  58.333 mls/ hr TPN  CONT IV  Last administered on 4/16/20at 22:10;  Start 

4/16/20 at 22:00;  Stop 4/17/20 at 21:59;  Status DC


Sodium Chloride 100 meq/Potassium Phosphate 5 mmol/ Magnesium Sulfate 12 

meq/Calcium Gluconate 15 meq/ Multivitamins 10 ml/Chromium/ Copper/Manganese/ 

Seleni/Zn 0.5 ml/ Insulin Human Regular 35 unit/ Potassium Chloride 20 meq/ 

Total Parenteral Nutrition/Amino Acids/Dextrose/ Fat Emulsion Intravenous 1,400 

ml @  58.333 mls/ hr TPN  CONT IV  Last administered on 4/17/20at 22:59;  Start 

4/17/20 at 22:00;  Stop 4/18/20 at 21:59;  Status DC


Sodium Chloride 1,000 ml @  1,000 mls/hr Q1H PRN IV hypotension;  Start 4/18/20 

at 08:27;  Stop 4/18/20 at 14:26;  Status DC


Albumin Human 200 ml @  200 mls/hr 1X PRN  PRN IV Hypotension Last administered 

on 4/18/20at 09:18;  Start 4/18/20 at 08:30;  Stop 4/18/20 at 14:29;  Status DC


Sodium Chloride 1,000 ml @  400 mls/hr Q2H30M PRN IV PATENCY;  Start 4/18/20 at 

08:27;  Stop 4/18/20 at 20:26;  Status DC


Info (PHARMACY MONITORING -- do not chart) 1 each PRN DAILY  PRN MC SEE 

COMMENTS;  Start 4/18/20 at 08:30;  Status Cancel


Info (PHARMACY MONITORING -- do not chart) 1 each PRN DAILY  PRN MC SEE 

COMMENTS;  Start 4/18/20 at 08:30;  Stop 4/26/20 at 13:10;  Status DC


Sodium Chloride 100 meq/Potassium Chloride 40 meq/ Magnesium Sulfate 15 

meq/Calcium Gluconate 15 meq/ Multivitamins 10 ml/Chromium/ Copper/Manganese/ 

Seleni/Zn 0.5 ml/ Insulin Human Regular 35 unit/ Total Parenteral Nutr

ition/Amino Acids/Dextrose/ Fat Emulsion Intravenous 1,400 ml @  58.333 mls/ hr 

TPN  CONT IV  Last administered on 4/18/20at 22:00;  Start 4/18/20 at 22:00;  

Stop 4/19/20 at 21:59;  Status DC


Potassium Chloride/Water 100 ml @  100 mls/hr 1X  ONCE IV  Last administered on 

4/18/20at 17:28;  Start 4/18/20 at 14:45;  Stop 4/18/20 at 15:44;  Status DC


Sodium Chloride 100 meq/Potassium Chloride 40 meq/ Magnesium Sulfate 15 

meq/Calcium Gluconate 15 meq/ Multivitamins 10 ml/Chromium/ Copper/Manganese/ 

Seleni/Zn 0.5 ml/ Insulin Human Regular 35 unit/ Total Parenteral 

Nutrition/Amino Acids/Dextrose/ Fat Emulsion Intravenous 1,400 ml @  58.333 mls/

hr TPN  CONT IV  Last administered on 4/19/20at 22:46;  Start 4/19/20 at 22:00; 

Stop 4/20/20 at 21:59;  Status DC


Sodium Chloride 100 meq/Potassium Chloride 40 meq/ Magnesium Sulfate 20 

meq/Calcium Gluconate 15 meq/ Multivitamins 10 ml/Chromium/ Copper/Manganese/ 

Seleni/Zn 0.5 ml/ Insulin Human Regular 35 unit/ Total Parenteral 

Nutrition/Amino Acids/Dextrose/ Fat Emulsion Intravenous 1,400 ml @  58.333 mls/

hr TPN  CONT IV  Last administered on 4/20/20at 22:31;  Start 4/20/20 at 22:00; 

Stop 4/21/20 at 21:59;  Status DC


Fentanyl Citrate (Fentanyl 2ml Vial) 50 mcg PRN Q2HR  PRN IVP PAIN Last 

administered on 4/27/20at 13:32;  Start 4/20/20 at 21:00;  Stop 4/28/20 at 

12:53;  Status DC


Fentanyl Citrate (Fentanyl 2ml Vial) 25 mcg PRN Q2HR  PRN IVP PAIN;  Start 

4/20/20 at 21:00;  Stop 4/28/20 at 12:54;  Status DC


Enoxaparin Sodium (Lovenox 100mg Syringe) 100 mg Q12HR SQ ;  Start 4/21/20 at 

21:00;  Status UNV


Amino Acids/ Glycerin/ Electrolytes 1,000 ml @  75 mls/hr [id number] IV ;  Start 

4/20/20 at 21:15;  Status UNV


Sodium Chloride 1,000 ml @  1,000 mls/hr Q1H PRN IV hypotension;  Start 4/21/20 

at 07:56;  Stop 4/21/20 at 13:55;  Status DC


Albumin Human 200 ml @  200 mls/hr 1X PRN  PRN IV Hypotension Last administered 

on 4/21/20at 08:40;  Start 4/21/20 at 08:00;  Stop 4/21/20 at 13:59;  Status DC


Sodium Chloride 1,000 ml @  400 mls/hr Q2H30M PRN IV PATENCY;  Start 4/21/20 at 

07:56;  Stop 4/21/20 at 19:55;  Status DC


Info (PHARMACY MONITORING -- do not chart) 1 each PRN DAILY  PRN MC SEE 

COMMENTS;  Start 4/21/20 at 08:00;  Status UNV


Info (PHARMACY MONITORING -- do not chart) 1 each PRN DAILY  PRN MC SEE 

COMMENTS;  Start 4/21/20 at 08:00;  Status UNV


Daptomycin 430 mg/ Sodium Chloride 50 ml @  100 mls/hr Q24H IV  Last 

administered on 4/21/20at 12:35;  Start 4/21/20 at 09:00;  Stop 4/21/20 at 

12:49;  Status DC


Sodium Chloride 100 meq/Potassium Chloride 40 meq/ Magnesium Sulfate 20 

meq/Calcium Gluconate 15 meq/ Multivitamins 10 ml/Chromium/ Copper/Manganese/ 

Seleni/Zn 0.5 ml/ Insulin Human Regular 35 unit/ Total Parenteral 

Nutrition/Amino Acids/Dextrose/ Fat Emulsion Intravenous 1,400 ml @  58.333 mls/

hr TPN  CONT IV  Last administered on 4/21/20at 21:26;  Start 4/21/20 at 22:00; 

Stop 4/22/20 at 21:59;  Status DC


Daptomycin 430 mg/ Sodium Chloride 50 ml @  100 mls/hr Q48H IV ;  Start 4/23/20 

at 09:00;  Stop 4/22/20 at 11:55;  Status DC


Sodium Chloride 100 meq/Potassium Chloride 40 meq/ Magnesium Sulfate 20 

meq/Calcium Gluconate 15 meq/ Multivitamins 10 ml/Chromium/ Copper/Manganese/ 

Seleni/Zn 0.5 ml/ Insulin Human Regular 35 unit/ Total Parenteral 

Nutrition/Amino Acids/Dextrose/ Fat Emulsion Intravenous 1,400 ml @  58.333 mls/

hr TPN  CONT IV  Last administered on 4/22/20at 22:27;  Start 4/22/20 at 22:00; 

Stop 4/23/20 at 21:59;  Status DC


Daptomycin 430 mg/ Sodium Chloride 50 ml @  100 mls/hr Q24H IV  Last 

administered on 4/24/20at 15:07;  Start 4/22/20 at 13:00;  Stop 4/25/20 at 

13:15;  Status DC


Sodium Chloride 100 meq/Potassium Chloride 40 meq/ Magnesium Sulfate 20 

meq/Calcium Gluconate 10 meq/ Multivitamins 10 ml/Chromium/ Copper/Manganese/ 

Seleni/Zn 0.5 ml/ Insulin Human Regular 35 unit/ Total Parenteral 

Nutrition/Amino Acids/Dextrose/ Fat Emulsion Intravenous 1,400 ml @  58.333 mls/

hr TPN  CONT IV  Last administered on 4/24/20at 00:06;  Start 4/23/20 at 22:00; 

Stop 4/24/20 at 21:59;  Status DC


Alteplase, Recombinant (Cathflo For Central Catheter Clearance) 1 mg 1X  ONCE 

INT CAT  Last administered on 4/24/20at 11:44;  Start 4/24/20 at 10:45;  Stop 

4/24/20 at 10:46;  Status DC


Ondansetron HCl (Zofran) 4 mg PRN Q6HRS  PRN IV NAUSEA/VOMITING;  Start 4/27/20 

at 07:00;  Stop 4/28/20 at 06:59;  Status DC


Fentanyl Citrate (Fentanyl 2ml Vial) 25 mcg PRN Q5MIN  PRN IV MILD PAIN 1-3;  

Start 4/27/20 at 07:00;  Stop 4/28/20 at 06:59;  Status DC


Fentanyl Citrate (Fentanyl 2ml Vial) 50 mcg PRN Q5MIN  PRN IV MODERATE TO SEVERE

PAIN Last administered on 4/27/20at 10:17;  Start 4/27/20 at 07:00;  Stop 4/28/ 20 at 06:59;  Status DC


Ringer's Solution 1,000 ml @  30 mls/hr Q24H IV ;  Start 4/27/20 at 07:00;  Stop

4/27/20 at 18:59;  Status DC


Lidocaine HCl (Xylocaine-Mpf 1% 2ml Vial) 2 ml PRN 1X  PRN ID PRIOR TO IV START;

 Start 4/27/20 at 07:00;  Stop 4/28/20 at 06:59;  Status DC


Prochlorperazine Edisylate (Compazine) 5 mg PACU PRN  PRN IV NAUSEA, MRX1;  

Start 4/27/20 at 07:00;  Stop 4/28/20 at 06:59;  Status DC


Sodium Acetate 50 meq/Potassium Acetate 55 meq/ Magnesium Sulfate 20 meq/Calcium

Gluconate 10 meq/ Multivitamins 10 ml/Chromium/ Copper/Manganese/ Seleni/Zn 0.5 

ml/ Insulin Human Regular 35 unit/ Total Parenteral Nutrition/Amino A

cids/Dextrose/ Fat Emulsion Intravenous 1,400 ml @  58.333 mls/ hr TPN  CONT IV 

;  Start 4/24/20 at 22:00;  Stop 4/24/20 at 14:15;  Status DC


Sodium Acetate 50 meq/Potassium Acetate 55 meq/ Magnesium Sulfate 20 meq/Calcium

Gluconate 10 meq/ Multivitamins 10 ml/Chromium/ Copper/Manganese/ Seleni/Zn 0.5 

ml/ Insulin Human Regular 35 unit/ Total Parenteral Nutrition/Amino 

Acids/Dextrose/ Fat Emulsion Intravenous 1,800 ml @  75 mls/hr TPN  CONT IV  

Last administered on 4/24/20at 22:38;  Start 4/24/20 at 22:00;  Stop 4/25/20 at 

21:59;  Status DC


Sodium Chloride 1,000 ml @  1,000 mls/hr Q1H PRN IV hypotension;  Start 4/24/20 

at 15:31;  Stop 4/24/20 at 21:30;  Status DC


Diphenhydramine HCl (Benadryl) 25 mg 1X PRN  PRN IV ITCHING;  Start 4/24/20 at 

15:45;  Stop 4/25/20 at 15:44;  Status DC


Diphenhydramine HCl (Benadryl) 25 mg 1X PRN  PRN IV ITCHING;  Start 4/24/20 at 

15:45;  Stop 4/25/20 at 15:44;  Status DC


Sodium Chloride 1,000 ml @  400 mls/hr Q2H30M PRN IV PATENCY;  Start 4/24/20 at 

15:31;  Stop 4/25/20 at 03:30;  Status DC


Info (PHARMACY MONITORING -- do not chart) 1 each PRN DAILY  PRN MC SEE 

COMMENTS;  Start 4/24/20 at 15:45;  Stop 5/26/20 at 14:14;  Status DC


Sodium Acetate 50 meq/Potassium Acetate 55 meq/ Magnesium Sulfate 20 meq/Calcium

Gluconate 10 meq/ Multivitamins 10 ml/Chromium/ Copper/Manganese/ Seleni/Zn 0.5 

ml/ Insulin Human Regular 35 unit/ Total Parenteral Nutrition/Amino Ac

ids/Dextrose/ Fat Emulsion Intravenous 1,800 ml @  75 mls/hr TPN  CONT IV  Last 

administered on 4/25/20at 22:03;  Start 4/25/20 at 22:00;  Stop 4/26/20 at 

21:59;  Status DC


Daptomycin 430 mg/ Sodium Chloride 50 ml @  100 mls/hr Q24H IV  Last 

administered on 4/30/20at 13:00;  Start 4/25/20 at 13:00;  Stop 4/30/20 at 

20:58;  Status DC


Heparin Sodium (Porcine) 1000 unit/Sodium Chloride 1,001 ml @  1,001 mls/hr 1X  

ONCE IRR ;  Start 4/27/20 at 06:00;  Stop 4/27/20 at 06:59;  Status DC


Potassium Acetate 55 meq/Magnesium Sulfate 20 meq/ Calcium Gluconate 10 meq/ 

Multivitamins 10 ml/Chromium/ Copper/Manganese/ Seleni/Zn 0.5 ml/ Insulin Human 

Regular 35 unit/ Total Parenteral Nutrition/Amino Acids/Dextrose/ Fat Emulsion 

Intravenous 1,920 ml @  80 mls/hr TPN  CONT IV  Last administered on 4/26/20at 

22:10;  Start 4/26/20 at 22:00;  Stop 4/27/20 at 21:59;  Status DC


Dexamethasone Sodium Phosphate (Decadron) 4 mg STK-MED ONCE .ROUTE ;  Start 

4/27/20 at 10:56;  Stop 4/27/20 at 10:57;  Status DC


Ondansetron HCl (Zofran) 4 mg STK-MED ONCE .ROUTE ;  Start 4/27/20 at 10:56;  

Stop 4/27/20 at 10:57;  Status DC


Rocuronium Bromide (Zemuron) 50 mg STK-MED ONCE .ROUTE ;  Start 4/27/20 at 

10:56;  Stop 4/27/20 at 10:57;  Status DC


Fentanyl Citrate (Fentanyl 2ml Vial) 100 mcg STK-MED ONCE .ROUTE ;  Start 

4/27/20 at 10:56;  Stop 4/27/20 at 10:57;  Status DC


Bupivacaine HCl/ Epinephrine Bitart (Sensorcain-Epi 0.5%-1:[id number] Mpf) 30 ml 

STK-MED ONCE .ROUTE  Last administered on 4/27/20at 12:01;  Start 4/27/20 at 

10:58;  Stop 4/27/20 at 10:58;  Status DC


Cellulose (Surgicel Hemostat 2x14) 1 each STK-MED ONCE .ROUTE ;  Start 4/27/20 

at 10:58;  Stop 4/27/20 at 10:59;  Status DC


Iohexol (Omnipaque 300 Mg/ml) 50 ml STK-MED ONCE .ROUTE ;  Start 4/27/20 at 

10:58;  Stop 4/27/20 at 10:59;  Status DC


Cellulose (Surgicel Hemostat 4x8) 1 each STK-MED ONCE .ROUTE ;  Start 4/27/20 at

10:58;  Stop 4/27/20 at 10:59;  Status DC


Bisacodyl (Dulcolax Supp) 10 mg STK-MED ONCE .ROUTE ;  Start 4/27/20 at 10:59;  

Stop 4/27/20 at 10:59;  Status DC


Heparin Sodium (Porcine) 1000 unit/Sodium Chloride 1,001 ml @  1,001 mls/hr 1X  

ONCE IRR ;  Start 4/27/20 at 12:00;  Stop 4/27/20 at 12:59;  Status DC


Propofol 20 ml @ As Directed STK-MED ONCE IV ;  Start 4/27/20 at 11:05;  Stop 

4/27/20 at 11:05;  Status DC


Sevoflurane (Ultane) 90 ml STK-MED ONCE IH ;  Start 4/27/20 at 11:05;  Stop 

4/27/20 at 11:05;  Status DC


Sevoflurane (Ultane) 60 ml STK-MED ONCE IH ;  Start 4/27/20 at 12:26;  Stop 

4/27/20 at 12:27;  Status DC


Propofol 20 ml @ As Directed STK-MED ONCE IV ;  Start 4/27/20 at 12:26;  Stop 

4/27/20 at 12:27;  Status DC


Phenylephrine HCl (PHENYLEPHRINE in 0.9% NACL PF) 1 mg STK-MED ONCE IV ;  Start 

4/27/20 at 12:34;  Stop 4/27/20 at 12:34;  Status DC


Heparin Sodium (Porcine) (Heparin Sodium) 5,000 unit Q12HR SQ  Last administered

on 5/6/20at 20:57;  Start 4/27/20 at 21:00;  Stop 5/7/20 at 09:59;  Status DC


Sodium Chloride (Normal Saline Flush) 3 ml QSHIFT  PRN IV AFTER MEDS AND BLOOD 

DRAWS;  Start 4/27/20 at 13:45;  Status Cancel


Naloxone HCl (Narcan) 0.4 mg PRN Q2MIN  PRN IV SEE INSTRUCTIONS Last 

administered on 6/6/20at 15:15;  Start 4/27/20 at 13:45;  Stop 7/1/20 at 16:00; 

Status DC


Sodium Chloride 1,000 ml @  25 mls/hr Q24H IV  Last administered on 5/26/20at 

13:37;  Start 4/27/20 at 13:37;  Stop 5/29/20 at 13:09;  Status DC


Naloxone HCl (Narcan) 0.4 mg PRN Q2MIN  PRN IV SEE INSTRUCTIONS;  Start 4/27/20 

at 14:30;  Status UNV


Sodium Chloride 1,000 ml @  25 mls/hr Q24H IV ;  Start 4/27/20 at 14:30;  Status

UNV


Hydromorphone HCl 30 ml @ 0 mls/hr CONT PRN  PRN IV PER PROTOCOL Last a

dministered on 5/2/20at 16:08;  Start 4/27/20 at 14:30;  Stop 5/4/20 at 08:55;  

Status DC


Potassium Acetate 55 meq/Magnesium Sulfate 20 meq/ Calcium Gluconate 10 meq/ 

Multivitamins 10 ml/Chromium/ Copper/Manganese/ Seleni/Zn 0.5 ml/ Insulin Human 

Regular 35 unit/ Total Parenteral Nutrition/Amino Acids/Dextrose/ Fat Emulsion 

Intravenous 1,920 ml @  80 mls/hr TPN  CONT IV  Last administered on 4/27/20at 

22:01;  Start 4/27/20 at 22:00;  Stop 4/28/20 at 21:59;  Status DC


Bumetanide (Bumex) 2 mg BID92 IV  Last administered on 5/1/20at 13:50;  Start 

4/28/20 at 14:00;  Stop 5/2/20 at 14:10;  Status DC


Meropenem 1 gm/ Sodium Chloride 100 ml @  200 mls/hr Q8HRS IV  Last administered

on 5/22/20at 05:53;  Start 4/28/20 at 14:00;  Stop 5/22/20 at 09:31;  Status DC


Potassium Acetate 55 meq/Magnesium Sulfate 20 meq/ Calcium Gluconate 10 meq/ 

Multivitamins 10 ml/Chromium/ Copper/Manganese/ Seleni/Zn 0.5 ml/ Insulin Human 

Regular 35 unit/ Total Parenteral Nutrition/Amino Acids/Dextrose/ Fat Emulsion 

Intravenous 1,920 ml @  80 mls/hr TPN  CONT IV  Last administered on 4/28/20at 

22:02;  Start 4/28/20 at 22:00;  Stop 4/29/20 at 21:59;  Status DC


Hydromorphone HCl (Dilaudid Standard PCA) 12 mg STK-MED ONCE IV ;  Start 4/27/20

at 14:35;  Stop 4/28/20 at 13:53;  Status DC


Artificial Tears (Artificial Tears) 1 drop PRN Q15MIN  PRN OU DRY EYE Last 

administered on 6/23/20at 21:17;  Start 4/29/20 at 05:30


Hydromorphone HCl (Dilaudid Standard PCA) 12 mg STK-MED ONCE IV ;  Start 4/28/20

at 12:05;  Stop 4/29/20 at 09:15;  Status DC


Potassium Acetate 65 meq/Magnesium Sulfate 20 meq/ Calcium Gluconate 10 meq/ 

Multivitamins 10 ml/Chromium/ Copper/Manganese/ Seleni/Zn 0.5 ml/ Insulin Human 

Regular 30 unit/ Total Parenteral Nutrition/Amino Acids/Dextrose/ Fat Emulsion 

Intravenous 1,920 ml @  80 mls/hr TPN  CONT IV  Last administered on 4/29/20at 

22:22;  Start 4/29/20 at 22:00;  Stop 4/30/20 at 21:59;  Status DC


Cyclobenzaprine HCl (Flexeril) 10 mg PRN Q6HRS  PRN PO MUSCLE SPASMS Last 

administered on 8/8/20at 20:50;  Start 4/30/20 at 10:45


Potassium Acetate 55 meq/Magnesium Sulfate 20 meq/ Calcium Gluconate 10 meq/ 

Multivitamins 10 ml/Chromium/ Copper/Manganese/ Seleni/Zn 0.5 ml/ Insulin Human 

Regular 30 unit/ Total Parenteral Nutrition/Amino Acids/Dextrose/ Fat Emulsion 

Intravenous 1,920 ml @  80 mls/hr TPN  CONT IV  Last administered on 5/1/20at 

01:00;  Start 4/30/20 at 22:00;  Stop 5/1/20 at 21:59;  Status DC


Magnesium Sulfate 50 ml @ 25 mls/hr 1X  ONCE IV  Last administered on 4/30/20at 

17:18;  Start 4/30/20 at 12:45;  Stop 4/30/20 at 14:44;  Status DC


Potassium Chloride/Water 100 ml @  100 mls/hr 1X  ONCE IV  Last administered on 

5/1/20at 11:27;  Start 5/1/20 at 12:00;  Stop 5/1/20 at 12:59;  Status DC


Hydromorphone HCl (Dilaudid Standard PCA) 12 mg STK-MED ONCE IV ;  Start 4/29/20

at 10:50;  Stop 5/1/20 at 11:02;  Status DC


Hydromorphone HCl (Dilaudid Standard PCA) 12 mg STK-MED ONCE IV ;  Start 4/30/20

at 13:47;  Stop 5/1/20 at 11:03;  Status DC


Potassium Acetate 30 meq/Magnesium Sulfate 20 meq/ Calcium Gluconate 10 meq/ 

Multivitamins 10 ml/Chromium/ Copper/Manganese/ Seleni/Zn 0.5 ml/ Insulin Human 

Regular 30 unit/ Potassium Chloride 30 meq/ Total Parenteral Nutrition/Amino 

Acids/Dextrose/ Fat Emulsion Intravenous 1,920 ml @  80 mls/hr TPN  CONT IV  

Last administered on 5/1/20at 22:34;  Start 5/1/20 at 22:00;  Stop 5/2/20 at 

21:59;  Status DC


Potassium Chloride/Water 100 ml @  100 mls/hr Q1H IV  Last administered on 5/2 /20at 13:05;  Start 5/2/20 at 07:00;  Stop 5/2/20 at 10:59;  Status DC


Magnesium Sulfate 50 ml @ 25 mls/hr 1X  ONCE IV  Last administered on 5/2/20at 

10:34;  Start 5/2/20 at 10:30;  Stop 5/2/20 at 12:29;  Status DC


Potassium Chloride 75 meq/ Magnesium Sulfate 20 meq/Calcium Gluconate 10 meq/ 

Multivitamins 10 ml/Chromium/ Copper/Manganese/ Seleni/Zn 0.5 ml/ Insulin Human 

Regular 30 unit/ Total Parenteral Nutrition/Amino Acids/Dextrose/ Fat Emulsion 

Intravenous 1,920 ml @  80 mls/hr TPN  CONT IV  Last administered on 5/2/20at 

21:51;  Start 5/2/20 at 22:00;  Stop 5/3/20 at 22:00;  Status DC


Potassium Chloride 75 meq/ Magnesium Sulfate 20 meq/Calcium Gluconate 10 meq/ 

Multivitamins 10 ml/Chromium/ Copper/Manganese/ Seleni/Zn 0.5 ml/ Insulin Human 

Regular 25 unit/ Total Parenteral Nutrition/Amino Acids/Dextrose/ Fat Emulsion 

Intravenous 1,920 ml @  80 mls/hr TPN  CONT IV  Last administered on 5/3/20at 

22:04;  Start 5/3/20 at 22:00;  Stop 5/4/20 at 21:59;  Status DC


Hydromorphone HCl (Dilaudid) 0.4 mg PRN Q4HRS  PRN IVP PAIN Last administered on

5/4/20at 10:57;  Start 5/4/20 at 09:00;  Stop 5/4/20 at 18:59;  Status DC


Micafungin Sodium 100 mg/Dextrose 100 ml @  100 mls/hr Q24H IV  Last 

administered on 5/26/20at 12:17;  Start 5/4/20 at 11:00;  Stop 5/27/20 at 09:59;

 Status DC


Daptomycin 485 mg/ Sodium Chloride 50 ml @  100 mls/hr Q24H IV  Last 

administered on 5/11/20at 13:10;  Start 5/4/20 at 11:00;  Stop 5/12/20 at 07:44;

 Status DC


Potassium Chloride 75 meq/ Magnesium Sulfate 15 meq/Calcium Gluconate 8 meq/ 

Multivitamins 10 ml/Chromium/ Copper/Manganese/ Seleni/Zn 0.5 ml/ Insulin Human 

Regular 25 unit/ Total Parenteral Nutrition/Amino Acids/Dextrose/ Fat Emulsion 

Intravenous 1,920 ml @  80 mls/hr TPN  CONT IV  Last administered on 5/4/20at 

23:08;  Start 5/4/20 at 22:00;  Stop 5/5/20 at 21:59;  Status DC


Haloperidol Lactate (Haldol Inj) 3 mg 1X  ONCE IVP  Last administered on 

5/4/20at 14:37;  Start 5/4/20 at 14:30;  Stop 5/4/20 at 14:31;  Status DC


Hydromorphone HCl (Dilaudid) 1 mg PRN Q4HRS  PRN IVP PAIN Last administered on 

5/18/20at 06:25;  Start 5/4/20 at 19:00;  Stop 5/18/20 at 17:10;  Status DC


Potassium Chloride 75 meq/ Magnesium Sulfate 15 meq/Calcium Gluconate 8 meq/ 

Multivitamins 10 ml/Chromium/ Copper/Manganese/ Seleni/Zn 0.5 ml/ Insulin Human 

Regular 20 unit/ Total Parenteral Nutrition/Amino Acids/Dextrose/ Fat Emulsion 

Intravenous 1,920 ml @  80 mls/hr TPN  CONT IV  Last administered on 5/5/20at 

22:10;  Start 5/5/20 at 22:00;  Stop 5/6/20 at 21:59;  Status DC


Lidocaine HCl (Buffered Lidocaine 1%) 3 ml STK-MED ONCE .ROUTE ;  Start 5/6/20 

at 11:31;  Stop 5/6/20 at 11:31;  Status DC


Lidocaine HCl (Buffered Lidocaine 1%) 3 ml STK-MED ONCE .ROUTE ;  Start 5/6/20 

at 12:28;  Stop 5/6/20 at 12:29;  Status DC


Lidocaine HCl (Buffered Lidocaine 1%) 6 ml 1X  ONCE INJ  Last administered on 

5/6/20at 12:53;  Start 5/6/20 at 12:45;  Stop 5/6/20 at 12:46;  Status DC


Potassium Chloride 75 meq/ Magnesium Sulfate 15 meq/Calcium Gluconate 8 meq/ 

Multivitamins 10 ml/Chromium/ Copper/Manganese/ Seleni/Zn 0.5 ml/ Insulin Human 

Regular 20 unit/ Total Parenteral Nutrition/Amino Acids/Dextrose/ Fat Emulsion 

Intravenous 1,920 ml @  80 mls/hr TPN  CONT IV  Last administered on 5/6/20at 

22:00;  Start 5/6/20 at 22:00;  Stop 5/7/20 at 21:59;  Status DC


Potassium Chloride 75 meq/ Magnesium Sulfate 15 meq/Calcium Gluconate 8 meq/ 

Multivitamins 10 ml/Chromium/ Copper/Manganese/ Seleni/Zn 0.5 ml/ Insulin Human 

Regular 15 unit/ Total Parenteral Nutrition/Amino Acids/Dextrose/ Fat Emulsion 

Intravenous 1,920 ml @  80 mls/hr TPN  CONT IV  Last administered on 5/7/20at 

22:28;  Start 5/7/20 at 22:00;  Stop 5/8/20 at 21:59;  Status DC


Vecuronium Bromide (Norcuron Bolus) 6 mg PRN Q6HRS  PRN IV VENT ASYNCHRONY;  

Start 5/7/20 at 19:15;  Stop 5/7/20 at 19:35;  Status DC


Bumetanide (Bumex) 2 mg 1X  ONCE IV  Last administered on 5/7/20at 22:09;  Start

5/7/20 at 19:45;  Stop 5/7/20 at 19:46;  Status DC


Lidocaine HCl (Buffered Lidocaine 1%) 3 ml STK-MED ONCE .ROUTE ;  Start 5/8/20 

at 07:59;  Stop 5/8/20 at 07:59;  Status DC


Midazolam HCl (Versed) 5 mg STK-MED ONCE .ROUTE ;  Start 5/8/20 at 08:36;  Stop 

5/8/20 at 08:36;  Status DC


Fentanyl Citrate (Fentanyl 5ml Vial) 250 mcg STK-MED ONCE .ROUTE ;  Start 5/8/20

at 08:36;  Stop 5/8/20 at 08:37;  Status DC


Lidocaine HCl (Buffered Lidocaine 1%) 3 ml 1X  ONCE IJ  Last administered on 

5/8/20at 09:30;  Start 5/8/20 at 09:15;  Stop 5/8/20 at 09:16;  Status DC


Midazolam HCl (Versed) 5 mg 1X  ONCE IV  Last administered on 5/8/20at 09:30;  

Start 5/8/20 at 09:15;  Stop 5/8/20 at 09:16;  Status DC


Fentanyl Citrate (Fentanyl 5ml Vial) 250 mcg 1X  ONCE IV  Last administered on 

5/8/20at 09:30;  Start 5/8/20 at 09:15;  Stop 5/8/20 at 09:16;  Status DC


Bumetanide (Bumex) 2 mg DAILY IV  Last administered on 5/18/20at 08:07;  Start 

5/8/20 at 10:00;  Stop 5/18/20 at 17:15;  Status DC


Potassium Chloride 75 meq/ Magnesium Sulfate 15 meq/ Multivitamins 10 

ml/Chromium/ Copper/Manganese/ Seleni/Zn 0.5 ml/ Insulin Human Regular 15 unit/ 

Total Parenteral Nutrition/Amino Acids/Dextrose/ Fat Emulsion Intravenous 1,920 

ml @  80 mls/hr TPN  CONT IV  Last administered on 5/8/20at 21:59;  Start 5/8/20

at 22:00;  Stop 5/9/20 at 21:59;  Status DC


Metoclopramide HCl (Reglan Vial) 10 mg PRN Q3HRS  PRN IVP NAUSEA/VOMITING-3rd 

choice Last administered on 5/14/20at 04:25;  Start 5/9/20 at 16:45


Potassium Chloride 75 meq/ Magnesium Sulfate 15 meq/ Multivitamins 10 

ml/Chromium/ Copper/Manganese/ Seleni/Zn 0.5 ml/ Insulin Human Regular 15 unit/ 

Total Parenteral Nutrition/Amino Acids/Dextrose/ Fat Emulsion Intravenous 1,920 

ml @  80 mls/hr TPN  CONT IV  Last administered on 5/9/20at 22:41;  Start 5/9/20

at 22:00;  Stop 5/10/20 at 21:59;  Status DC


Magnesium Sulfate 50 ml @ 25 mls/hr 1X  ONCE IV  Last administered on 5/10/20at 

10:44;  Start 5/10/20 at 09:00;  Stop 5/10/20 at 10:59;  Status DC


Potassium Chloride/Water 100 ml @  100 mls/hr 1X  ONCE IV  Last administered on 

5/10/20at 09:37;  Start 5/10/20 at 09:00;  Stop 5/10/20 at 09:59;  Status DC


Duloxetine HCl (Cymbalta) 30 mg DAILY PO  Last administered on 5/11/20at 09:48; 

Start 5/10/20 at 14:00;  Stop 5/13/20 at 10:25;  Status DC


Potassium Chloride 80 meq/ Magnesium Sulfate 20 meq/ Multivitamins 10 

ml/Chromium/ Copper/Manganese/ Seleni/Zn 0.5 ml/ Insulin Human Regular 15 unit/ 

Total Parenteral Nutrition/Amino Acids/Dextrose/ Fat Emulsion Intravenous 1,920 

ml @  80 mls/hr TPN  CONT IV  Last administered on 5/10/20at 21:42;  Start 

5/10/20 at 22:00;  Stop 5/11/20 at 21:59;  Status DC


Potassium Chloride 80 meq/ Magnesium Sulfate 20 meq/ Multivitamins 10 

ml/Chromium/ Copper/Manganese/ Seleni/Zn 0.5 ml/ Insulin Human Regular 15 unit/ 

Total Parenteral Nutrition/Amino Acids/Dextrose/ Fat Emulsion Intravenous 1,920 

ml @  80 mls/hr TPN  CONT IV  Last administered on 5/11/20at 22:20;  Start 

5/11/20 at 22:00;  Stop 5/12/20 at 21:59;  Status DC


Lidocaine HCl (Buffered Lidocaine 1%) 3 ml STK-MED ONCE .ROUTE ;  Start 5/12/20 

at 09:54;  Stop 5/12/20 at 09:55;  Status DC


Hydromorphone HCl (Dilaudid Standard PCA) 12 mg STK-MED ONCE IV ;  Start 5/1/20 

at 15:50;  Stop 5/12/20 at 11:24;  Status DC


Potassium Chloride 80 meq/ Magnesium Sulfate 20 meq/ Multivitamins 10 

ml/Chromium/ Copper/Manganese/ Seleni/Zn 0.5 ml/ Insulin Human Regular 15 unit/ 

Total Parenteral Nutrition/Amino Acids/Dextrose/ Fat Emulsion Intravenous 1,920 

ml @  80 mls/hr TPN  CONT IV  Last administered on 5/12/20at 21:40;  Start 

5/12/20 at 22:00;  Stop 5/13/20 at 21:59;  Status DC


Lidocaine HCl (Buffered Lidocaine 1%) 6 ml 1X  ONCE INJ  Last administered on 

5/12/20at 14:15;  Start 5/12/20 at 14:15;  Stop 5/12/20 at 14:16;  Status DC


Potassium Chloride 80 meq/ Magnesium Sulfate 20 meq/ Multivitamins 10 

ml/Chromium/ Copper/Manganese/ Seleni/Zn 1 ml/ Insulin Human Regular 15 unit/ 

Total Parenteral Nutrition/Amino Acids/Dextrose/ Fat Emulsion Intravenous 1,920 

ml @  80 mls/hr TPN  CONT IV  Last administered on 5/13/20at 22:04;  Start 

5/13/20 at 22:00;  Stop 5/14/20 at 21:59;  Status DC


Potassium Chloride/Water 100 ml @  100 mls/hr 1X  ONCE IV  Last administered on 

5/14/20at 11:34;  Start 5/14/20 at 11:00;  Stop 5/14/20 at 11:59;  Status DC


Potassium Chloride 90 meq/ Magnesium Sulfate 20 meq/ Multivitamins 10 

ml/Chromium/ Copper/Manganese/ Seleni/Zn 1 ml/ Insulin Human Regular 15 unit/ 

Total Parenteral Nutrition/Amino Acids/Dextrose/ Fat Emulsion Intravenous 1,920 

ml @  80 mls/hr TPN  CONT IV  Last administered on 5/14/20at 22:57;  Start 

5/14/20 at 22:00;  Stop 5/15/20 at 21:59;  Status DC


Potassium Chloride 90 meq/ Magnesium Sulfate 20 meq/ Multivitamins 10 

ml/Chromium/ Copper/Manganese/ Seleni/Zn 1 ml/ Insulin Human Regular 15 unit/ 

Total Parenteral Nutrition/Amino Acids/Dextrose/ Fat Emulsion Intravenous 1,920 

ml @  80 mls/hr TPN  CONT IV  Last administered on 5/15/20at 22:48;  Start 

5/15/20 at 22:00;  Stop 5/16/20 at 21:59;  Status DC


Potassium Chloride 90 meq/ Magnesium Sulfate 20 meq/ Multivitamins 10 

ml/Chromium/ Copper/Manganese/ Seleni/Zn 1 ml/ Insulin Human Regular 15 unit/ 

Total Parenteral Nutrition/Amino Acids/Dextrose/ Fat Emulsion Intravenous 1,890 

ml @  78.75 mls/ hr TPN  CONT IV  Last administered on 5/16/20at 22:15;  Start 

5/16/20 at 22:00;  Stop 5/17/20 at 21:59;  Status DC


Linezolid/Dextrose 300 ml @  300 mls/hr Q12HR IV  Last administered on 5/19/20at

21:08;  Start 5/17/20 at 09:00;  Stop 5/20/20 at 08:11;  Status DC


Daptomycin 450 mg/ Sodium Chloride 50 ml @  100 mls/hr Q24H IV  Last 

administered on 5/20/20at 09:25;  Start 5/17/20 at 09:00;  Stop 5/21/20 at 

08:30;  Status DC


Potassium Chloride 90 meq/ Magnesium Sulfate 20 meq/ Multivitamins 10 

ml/Chromium/ Copper/Manganese/ Seleni/Zn 1 ml/ Insulin Human Regular 15 unit/ 

Total Parenteral Nutrition/Amino Acids/Dextrose/ Fat Emulsion Intravenous 1,890 

ml @  78.75 mls/ hr TPN  CONT IV  Last administered on 5/17/20at 21:34;  Start 

5/17/20 at 22:00;  Stop 5/18/20 at 21:59;  Status DC


Lorazepam (Ativan Inj) 2 mg STK-MED ONCE .ROUTE ;  Start 5/17/20 at 14:58;  Stop

5/17/20 at 14:58;  Status DC


Metoprolol Tartrate (Lopressor Vial) 5 mg 1X  ONCE IVP  Last administered on 

5/17/20at 15:31;  Start 5/17/20 at 15:15;  Stop 5/17/20 at 15:16;  Status DC


Lorazepam (Ativan Inj) 2 mg 1X  ONCE IVP  Last administered on 5/17/20at 15:30; 

Start 5/17/20 at 15:15;  Stop 5/17/20 at 15:16;  Status DC


Enoxaparin Sodium (Lovenox 40mg Syringe) 40 mg Q24H SQ  Last administered on 

6/5/20at 17:44;  Start 5/17/20 at 17:00;  Stop 6/7/20 at 06:50;  Status DC


Lorazepam (Ativan Inj) 1 mg PRN Q4HRS  PRN IVP ANXIETY / AGITATION MILD-MOD Last

administered on 5/31/20at 15:55;  Start 5/17/20 at 19:15;  Stop 6/2/20 at 11:45;

 Status DC


Lorazepam (Ativan Inj) 2 mg PRN Q4HRS  PRN IVP ANXIETY / AGITATION SEVERE Last 

administered on 6/1/20at 07:55;  Start 5/17/20 at 19:15;  Stop 6/2/20 at 11:45; 

Status DC


Fentanyl Citrate (Fentanyl 2ml Vial) 50 mcg PRN Q4HRS  PRN IVP SEVERE PAIN Last 

administered on 6/13/20at 05:15;  Start 5/18/20 at 13:15;  Stop 6/14/20 at 

09:29;  Status DC


Fentanyl Citrate (Fentanyl 2ml Vial) 25 mcg PRN Q4HRS  PRN IVP MODERATE PAIN 

Last administered on 6/13/20at 00:27;  Start 5/18/20 at 13:15;  Stop 6/14/20 at 

09:30;  Status DC


Potassium Chloride 90 meq/ Magnesium Sulfate 20 meq/ Multivitamins 10 

ml/Chromium/ Copper/Manganese/ Seleni/Zn 1 ml/ Insulin Human Regular 15 unit/ 

Total Parenteral Nutrition/Amino Acids/Dextrose/ Fat Emulsion Intravenous 1,890 

ml @  78.75 mls/ hr TPN  CONT IV  Last administered on 5/18/20at 22:18;  Start 

5/18/20 at 22:00;  Stop 5/19/20 at 21:59;  Status DC


Furosemide (Lasix) 40 mg 1X  ONCE IVP  Last administered on 5/18/20at 21:51;  

Start 5/18/20 at 21:45;  Stop 5/18/20 at 21:48;  Status DC


Albumin Human 100 ml @  100 mls/hr 1X PRN  PRN IV SEE COMMENTS;  Start 5/19/20 

at 01:30;  Stop 8/3/20 at 09:52;  Status DC


Furosemide (Lasix) 40 mg [id number] IVP  Last administered on 6/3/20at 08:04;  Start 

5/19/20 at 14:00;  Stop 6/3/20 at 13:07;  Status DC


Potassium Chloride 90 meq/ Magnesium Sulfate 20 meq/ Multivitamins 10 

ml/Chromium/ Copper/Manganese/ Seleni/Zn 1 ml/ Insulin Human Regular 15 unit/ 

Total Parenteral Nutrition/Amino Acids/Dextrose/ Fat Emulsion Intravenous 1,800 

ml @  75 mls/hr TPN  CONT IV  Last administered on 5/19/20at 22:31;  Start 

5/19/20 at 22:00;  Stop 5/20/20 at 21:59;  Status DC


Potassium Chloride 90 meq/ Magnesium Sulfate 20 meq/ Multivitamins 10 

ml/Chromium/ Copper/Manganese/ Seleni/Zn 1 ml/ Insulin Human Regular 15 unit/ 

Total Parenteral Nutrition/Amino Acids/Dextrose/ Fat Emulsion Intravenous 1,800 

ml @  75 mls/hr TPN  CONT IV  Last administered on 5/20/20at 22:28;  Start 

5/20/20 at 22:00;  Stop 5/21/20 at 21:59;  Status DC


Potassium Chloride 110 meq/ Magnesium Sulfate 20 meq/ Multivitamins 10 

ml/Chromium/ Copper/Manganese/ Seleni/Zn 1 ml/ Insulin Human Regular 15 unit/ 

Total Parenteral Nutrition/Amino Acids/Dextrose/ Fat Emulsion Intravenous 1,800 

ml @  75 mls/hr TPN  CONT IV  Last administered on 5/21/20at 22:01;  Start 

5/21/20 at 22:00;  Stop 5/22/20 at 21:59;  Status DC


Saliva Substitute (Biotene Moisturizing Mouth) 2 spray PRN Q15MIN  PRN PO DRY 

MOUTH;  Start 5/21/20 at 11:00


Potassium Chloride 110 meq/ Magnesium Sulfate 20 meq/ Multivitamins 10 

ml/Chromium/ Copper/Manganese/ Seleni/Zn 1 ml/ Insulin Human Regular 15 unit/ 

Total Parenteral Nutrition/Amino Acids/Dextrose/ Fat Emulsion Intravenous 1,800 

ml @  75 mls/hr TPN  CONT IV  Last administered on 5/22/20at 22:21;  Start 

5/22/20 at 22:00;  Stop 5/23/20 at 21:59;  Status DC


Potassium Chloride 110 meq/ Magnesium Sulfate 20 meq/ Multivitamins 10 

ml/Chromium/ Copper/Manganese/ Seleni/Zn 1 ml/ Insulin Human Regular 15 unit/ 

Total Parenteral Nutrition/Amino Acids/Dextrose/ Fat Emulsion Intravenous 1,800 

ml @  75 mls/hr TPN  CONT IV  Last administered on 5/23/20at 22:04;  Start 

5/23/20 at 22:00;  Stop 5/24/20 at 21:59;  Status DC


Potassium Chloride 110 meq/ Magnesium Sulfate 20 meq/ Multivitamins 10 

ml/Chromium/ Copper/Manganese/ Seleni/Zn 1 ml/ Insulin Human Regular 15 unit/ 

Total Parenteral Nutrition/Amino Acids/Dextrose/ Fat Emulsion Intravenous 1,800 

ml @  75 mls/hr TPN  CONT IV  Last administered on 5/24/20at 22:48;  Start 

5/24/20 at 22:00;  Stop 5/25/20 at 21:59;  Status DC


Potassium Chloride 70 meq/ Magnesium Sulfate 20 meq/ Multivitamins 10 

ml/Chromium/ Copper/Manganese/ Seleni/Zn 1 ml/ Insulin Human Regular 15 unit/ 

Total Parenteral Nutrition/Amino Acids/Dextrose/ Fat Emulsion Intravenous 1,800 

ml @  75 mls/hr TPN  CONT IV  Last administered on 5/25/20at 21:39;  Start 

5/25/20 at 22:00;  Stop 5/26/20 at 21:59;  Status DC


Meropenem 500 mg/ Sodium Chloride 50 ml @  100 mls/hr Q6HRS IV  Last 

administered on 5/27/20at 06:02;  Start 5/25/20 at 18:00;  Stop 5/27/20 at 

09:59;  Status DC


Barium Sulfate (Varibar Thin Liquid Apple) 148 gm 1X  ONCE PO ;  Start 5/26/20 

at 11:45;  Stop 5/26/20 at 11:49;  Status DC


Potassium Chloride 70 meq/ Magnesium Sulfate 20 meq/ Multivitamins 10 

ml/Chromium/ Copper/Manganese/ Seleni/Zn 1 ml/ Insulin Human Regular 15 unit/ 

Total Parenteral Nutrition/Amino Acids/Dextrose/ Fat Emulsion Intravenous 1,800 

ml @  75 mls/hr TPN  CONT IV  Last administered on 5/26/20at 22:27;  Start 

5/26/20 at 22:00;  Stop 5/27/20 at 21:59;  Status DC


Piperacillin Sod/ Tazobactam Sod 3.375 gm/Sodium Chloride 50 ml @  100 mls/hr 

Q6HRS IV  Last administered on 6/4/20at 06:10;  Start 5/27/20 at 12:00;  Stop 

6/4/20 at 07:26;  Status DC


Potassium Chloride 70 meq/ Magnesium Sulfate 20 meq/ Multivitamins 10 

ml/Chromium/ Copper/Manganese/ Seleni/Zn 1 ml/ Insulin Human Regular 15 unit/ 

Total Parenteral Nutrition/Amino Acids/Dextrose/ Fat Emulsion Intravenous 1,800 

ml @  75 mls/hr TPN  CONT IV  Last administered on 5/27/20at 22:03;  Start 

5/27/20 at 22:00;  Stop 5/28/20 at 21:59;  Status DC


Potassium Chloride 70 meq/ Magnesium Sulfate 20 meq/ Multivitamins 10 

ml/Chromium/ Copper/Manganese/ Seleni/Zn 1 ml/ Insulin Human Regular 15 unit/ 

Total Parenteral Nutrition/Amino Acids/Dextrose/ Fat Emulsion Intravenous 1,800 

ml @  75 mls/hr TPN  CONT IV  Last administered on 5/28/20at 22:33;  Start 

5/28/20 at 22:00;  Stop 5/29/20 at 21:59;  Status DC


Potassium Chloride 70 meq/ Magnesium Sulfate 20 meq/ Multivitamins 10 

ml/Chromium/ Copper/Manganese/ Seleni/Zn 1 ml/ Insulin Human Regular 15 unit/ 

Total Parenteral Nutrition/Amino Acids/Dextrose/ Fat Emulsion Intravenous 1,800 

ml @  75 mls/hr TPN  CONT IV  Last administered on 5/29/20at 23:13;  Start 

5/29/20 at 22:00;  Stop 5/30/20 at 21:59;  Status DC


Potassium Chloride 80 meq/ Magnesium Sulfate 20 meq/ Multivitamins 10 

ml/Chromium/ Copper/Manganese/ Seleni/Zn 1 ml/ Insulin Human Regular 15 unit/ 

Total Parenteral Nutrition/Amino Acids/Dextrose/ Fat Emulsion Intravenous 1,800 

ml @  75 mls/hr TPN  CONT IV  Last administered on 5/30/20at 22:30;  Start 

5/30/20 at 22:00;  Stop 5/31/20 at 21:59;  Status DC


Potassium Chloride 80 meq/ Magnesium Sulfate 20 meq/ Multivitamins 10 

ml/Chromium/ Copper/Manganese/ Seleni/Zn 1 ml/ Insulin Human Regular 15 unit/ 

Total Parenteral Nutrition/Amino Acids/Dextrose/ Fat Emulsion Intravenous 1,800 

ml @  75 mls/hr TPN  CONT IV  Last administered on 5/31/20at 21:54;  Start 

5/31/20 at 22:00;  Stop 6/1/20 at 21:59;  Status DC


Potassium Chloride/Water 100 ml @  100 mls/hr 1X  ONCE IV  Last administered on 

6/1/20at 10:15;  Start 6/1/20 at 10:00;  Stop 6/1/20 at 10:59;  Status DC


Potassium Chloride 90 meq/ Magnesium Sulfate 20 meq/ Multivitamins 10 

ml/Chromium/ Copper/Manganese/ Seleni/Zn 1 ml/ Insulin Human Regular 20 unit/ T

otal Parenteral Nutrition/Amino Acids/Dextrose/ Fat Emulsion Intravenous 1,800 

ml @  75 mls/hr TPN  CONT IV  Last administered on 6/1/20at 22:28;  Start 6/1/20

at 22:00;  Stop 6/2/20 at 21:59;  Status DC


Potassium Chloride 90 meq/ Magnesium Sulfate 20 meq/ Multivitamins 10 

ml/Chromium/ Copper/Manganese/ Seleni/Zn 1 ml/ Insulin Human Regular 20 unit/ 

Total Parenteral Nutrition/Amino Acids/Dextrose/ Fat Emulsion Intravenous 1,800 

ml @  75 mls/hr TPN  CONT IV  Last administered on 6/2/20at 22:08;  Start 6/2/20

at 22:00;  Stop 6/3/20 at 21:59;  Status DC


Lorazepam (Ativan Inj) 0.25 mg PRN Q4HRS  PRN IVP ANXIETY / AGITATION Last 

administered on 8/12/20at 09:50;  Start 6/3/20 at 07:30


Potassium Chloride 90 meq/ Magnesium Sulfate 20 meq/ Multivitamins 10 ml/Chromiu

m/ Copper/Manganese/ Seleni/Zn 1 ml/ Insulin Human Regular 20 unit/ Total 

Parenteral Nutrition/Amino Acids/Dextrose/ Fat Emulsion Intravenous 1,800 ml @  

75 mls/hr TPN  CONT IV  Last administered on 6/3/20at 23:13;  Start 6/3/20 at 

22:00;  Stop 6/4/20 at 21:59;  Status DC


Furosemide (Lasix) 40 mg DAILY IVP  Last administered on 6/5/20at 11:14;  Start 

6/3/20 at 13:30;  Stop 6/7/20 at 09:12;  Status DC


Fluoxetine HCl (PROzac) 20 mg QHS PEG  Last administered on 8/12/20at 20:48;  

Start 6/4/20 at 21:00


Fentanyl (Duragesic 50mcg/ Hr Patch) 1 patch Q72H TD  Last administered on 

6/4/20at 21:22;  Start 6/4/20 at 21:00;  Stop 6/13/20 at 12:00;  Status DC


Potassium Chloride 40 meq/ Potassium Acetate 60 meq/Magnesium Sulfate 10 meq/ 

Multivitamins 10 ml/Chromium/ Copper/Manganese/ Seleni/Zn 1 ml/ Insulin Human 

Regular 20 unit/ Total Parenteral Nutrition/Amino Acids/Dextrose/ Fat Emulsion 

Intravenous 1,800 ml @  75 mls/hr TPN  CONT IV  Last administered on 6/5/20at 

00:03;  Start 6/4/20 at 22:00;  Stop 6/5/20 at 21:59;  Status DC


Potassium Acetate 80 meq/Magnesium Sulfate 5 meq/ Multivitamins 10 ml/Chromium/ 

Copper/Manganese/ Seleni/Zn 1 ml/ Insulin Human Regular 20 unit/ Total 

Parenteral Nutrition/Amino Acids/Dextrose/ Fat Emulsion Intravenous 1,920 ml @  

80 mls/hr TPN  CONT IV  Last administered on 6/5/20at 21:59;  Start 6/5/20 at 

22:00;  Stop 6/6/20 at 21:59;  Status DC


Potassium Acetate 60 meq/Magnesium Sulfate 5 meq/ Multivitamins 10 ml/Chromium/ 

Copper/Manganese/ Seleni/Zn 1 ml/ Insulin Human Regular 30 unit/ Total Par

enteral Nutrition/Amino Acids/Dextrose/ Fat Emulsion Intravenous 1,920 ml @  80 

mls/hr TPN  CONT IV  Last administered on 6/6/20at 21:54;  Start 6/6/20 at 

22:00;  Stop 6/7/20 at 21:59;  Status DC


Norepinephrine Bitartrate 8 mg/ Dextrose 258 ml @  13.332 mls/ hr CONT  PRN IV 

PER PROTOCOL Last administered on 7/2/20at 09:09;  Start 6/7/20 at 06:30;  Stop 

8/3/20 at 09:45;  Status DC


Albumin Human 500 ml @  125 mls/hr 1X  ONCE IV  Last administered on 6/7/20at 

08:10;  Start 6/7/20 at 08:15;  Stop 6/7/20 at 12:14;  Status DC


Potassium Acetate 40 meq/Magnesium Sulfate 5 meq/ Multivitamins 10 ml/Chromium/ 

Copper/Manganese/ Seleni/Zn 1 ml/ Insulin Human Regular 30 unit/ Total 

Parenteral Nutrition/Amino Acids/Dextrose/ Fat Emulsion Intravenous 1,920 ml @  

80 mls/hr TPN  CONT IV  Last administered on 6/7/20at 22:23;  Start 6/7/20 at 

22:00;  Stop 6/8/20 at 21:59;  Status DC


Meropenem 1 gm/ Sodium Chloride 100 ml @  200 mls/hr Q8HRS IV ;  Start 6/7/20 at

14:00;  Status Cancel


Meropenem 1 gm/ Sodium Chloride 100 ml @  200 mls/hr Q8HRS IV  Last administered

on 6/7/20at 11:04;  Start 6/7/20 at 10:00;  Stop 6/7/20 at 13:00;  Status DC


Meropenem 1 gm/ Sodium Chloride 100 ml @  200 mls/hr Q12HR IV  Last administered

on 6/25/20at 08:27;  Start 6/7/20 at 21:00;  Stop 6/25/20 at 08:56;  Status DC


Sodium Chloride 1,000 ml @  1,000 mls/hr 1X  ONCE IV  Last administered on 

6/7/20at 11:06;  Start 6/7/20 at 10:45;  Stop 6/7/20 at 11:44;  Status DC


Micafungin Sodium 100 mg/Dextrose 100 ml @  100 mls/hr Q24H IV  Last 

administered on 6/24/20at 12:34;  Start 6/7/20 at 11:00;  Stop 6/25/20 at 08:56;

 Status DC


Daptomycin 410 mg/ Sodium Chloride 50 ml @  100 mls/hr Q24H IV  Last 

administered on 6/9/20at 13:33;  Start 6/7/20 at 14:00;  Stop 6/10/20 at 08:30; 

Status DC


Midazolam HCl (Versed) 2 mg STK-MED ONCE .ROUTE ;  Start 6/7/20 at 14:47;  Stop 

6/7/20 at 14:48;  Status DC


Fentanyl Citrate (Fentanyl 2ml Vial) 100 mcg STK-MED ONCE .ROUTE ;  Start 6/7/20

at 14:47;  Stop 6/7/20 at 14:48;  Status DC


Flumazenil (Romazicon) 0.5 mg STK-MED ONCE IV ;  Start 6/7/20 at 14:48;  Stop 

6/7/20 at 14:48;  Status DC


Naloxone HCl (Narcan) 0.4 mg STK-MED ONCE .ROUTE ;  Start 6/7/20 at 14:48;  Stop

6/7/20 at 14:48;  Status DC


Lidocaine HCl (Lidocaine 1% 20ml Vial) 20 ml STK-MED ONCE .ROUTE ;  Start 6/7/20

at 14:48;  Stop 6/7/20 at 14:48;  Status DC


Midazolam HCl (Versed) 2 mg 1X  ONCE IV  Last administered on 6/7/20at 15:28;  

Start 6/7/20 at 15:00;  Stop 6/7/20 at 15:01;  Status DC


Fentanyl Citrate (Fentanyl 2ml Vial) 100 mcg 1X  ONCE IV  Last administered on 

6/7/20at 15:28;  Start 6/7/20 at 15:00;  Stop 6/7/20 at 15:01;  Status DC


Lidocaine HCl (Lidocaine 1% 20ml Vial) 20 ml 1X  ONCE INJ  Last administered on 

6/7/20at 15:30;  Start 6/7/20 at 15:00;  Stop 6/7/20 at 15:01;  Status DC


Sodium Chloride 1,000 ml @  100 mls/hr Q10H IV  Last administered on 6/16/20at 

07:30;  Start 6/7/20 at 20:00;  Stop 6/16/20 at 11:26;  Status DC


Sodium Bicarbonate (Sodium Bicarb Adult 8.4% Syr) 50 meq 1X  ONCE IV  Last 

administered on 6/7/20at 21:47;  Start 6/7/20 at 22:00;  Stop 6/7/20 at 22:01;  

Status DC


Potassium Acetate 40 meq/Magnesium Sulfate 5 meq/ Multivitamins 10 ml/Chromium/ 

Copper/Manganese/ Seleni/Zn 1 ml/ Insulin Human Regular 30 unit/ Total 

Parenteral Nutrition/Amino Acids/Dextrose/ Fat Emulsion Intravenous 1,920 ml @  

80 mls/hr TPN  CONT IV  Last administered on 6/8/20at 22:28;  Start 6/8/20 at 

22:00;  Stop 6/9/20 at 21:59;  Status DC


Sodium Chloride 500 ml @  500 mls/hr 1X  ONCE IV  Last administered on 6/9/20at 

06:39;  Start 6/9/20 at 06:45;  Stop 6/9/20 at 07:44;  Status DC


Potassium Acetate 40 meq/Magnesium Sulfate 5 meq/ Multivitamins 10 ml/Chromium/ 

Copper/Manganese/ Seleni/Zn 1 ml/ Insulin Human Regular 30 unit/ Total 

Parenteral Nutrition/Amino Acids/Dextrose/ Fat Emulsion Intravenous 1,920 ml @  

80 mls/hr TPN  CONT IV  Last administered on 6/9/20at 22:03;  Start 6/9/20 at 

22:00;  Stop 6/10/20 at 21:59;  Status DC


Metoprolol Tartrate (Lopressor Vial) 5 mg PRN Q6HRS  PRN IVP HYPERTENSION Last 

administered on 8/11/20at 10:02;  Start 6/10/20 at 09:00


Potassium Acetate 40 meq/Magnesium Sulfate 5 meq/ Multivitamins 10 ml/Chromium/ 

Copper/Manganese/ Seleni/Zn 1 ml/ Insulin Human Regular 30 unit/ Total 

Parenteral Nutrition/Amino Acids/Dextrose/ Fat Emulsion Intravenous 1,920 ml @  

80 mls/hr TPN  CONT IV  Last administered on 6/10/20at 21:26;  Start 6/10/20 at 

22:00;  Stop 6/11/20 at 21:59;  Status DC


Potassium Acetate 40 meq/Magnesium Sulfate 5 meq/ Multivitamins 10 ml/Chromium/ 

Copper/Manganese/ Seleni/Zn 1 ml/ Insulin Human Regular 30 unit/ Total 

Parenteral Nutrition/Amino Acids/Dextrose/ Fat Emulsion Intravenous 1,920 ml @  

80 mls/hr TPN  CONT IV  Last administered on 6/11/20at 23:23;  Start 6/11/20 at 

22:00;  Stop 6/12/20 at 21:59;  Status DC


Potassium Acetate 40 meq/Magnesium Sulfate 5 meq/ Multivitamins 10 ml/Chromium/ 

Copper/Manganese/ Seleni/Zn 1 ml/ Insulin Human Regular 30 unit/ Total 

Parenteral Nutrition/Amino Acids/Dextrose/ Fat Emulsion Intravenous 1,920 ml @  

80 mls/hr TPN  CONT IV  Last administered on 6/12/20at 21:35;  Start 6/12/20 at 

22:00;  Stop 6/13/20 at 21:59;  Status DC


Furosemide (Lasix) 20 mg 1X  ONCE IVP  Last administered on 6/13/20at 06:26;  

Start 6/13/20 at 06:15;  Stop 6/13/20 at 06:16;  Status DC


Methylprednisolone Sodium Succinate (SOLU-Medrol 125MG VIAL) 125 mg 1X  ONCE IV 

Last administered on 6/13/20at 06:26;  Start 6/13/20 at 06:15;  Stop 6/13/20 at 

06:16;  Status DC


Albuterol/ Ipratropium (Duoneb) 3 ml Q4HRS NEB  Last administered on 8/13/20at 

11:42;  Start 6/13/20 at 08:00


Fentanyl Citrate 30 ml @ 0 mls/hr CONT  PRN IV SEE PROTOCOL Last administered on

7/4/20at 08:03;  Start 6/13/20 at 06:00;  Stop 7/4/20 at 12:42;  Status DC


Propofol 100 ml @ 0 mls/hr CONT  PRN IV SEE PROTOCOL Last administered on 

6/20/20at 23:50;  Start 6/13/20 at 06:00;  Stop 8/3/20 at 09:45;  Status DC


Fentanyl Citrate (Fentanyl 2ml Vial) 25 mcg PRN Q1HR  PRN IV SEE COMMENTS Last 

administered on 8/1/20at 23:56;  Start 6/13/20 at 06:00;  Stop 8/3/20 at 09:45; 

Status DC


Fentanyl Citrate (Fentanyl 2ml Vial) 50 mcg PRN Q1HR  PRN IV SEE COMMENTS Last 

administered on 8/3/20at 09:39;  Start 6/13/20 at 06:00;  Stop 8/3/20 at 09:45; 

Status DC


Chlorhexidine Gluconate (Peridex) 15 ml BID MM ;  Start 6/13/20 at 09:00;  Stop 

6/13/20 at 07:58;  Status DC


Potassium Acetate 40 meq/Magnesium Sulfate 5 meq/ Multivitamins 10 ml/Chromium/ 

Copper/Manganese/ Seleni/Zn 1 ml/ Insulin Human Regular 30 unit/ Total 

Parenteral Nutrition/Amino Acids/Dextrose/ Fat Emulsion Intravenous 1,920 ml @  

80 mls/hr TPN  CONT IV  Last administered on 6/13/20at 21:19;  Start 6/13/20 at 

22:00;  Stop 6/14/20 at 21:59;  Status DC


Acetylcysteine (Mucomyst 20% Resp Treatment) 600 mg BID NEB  Last administered 

on 6/19/20at 09:33;  Start 6/13/20 at 21:00;  Stop 6/19/20 at 10:39;  Status DC


Magnesium Sulfate 100 ml @  25 mls/hr 1X  ONCE IV  Last administered on 

6/13/20at 15:48;  Start 6/13/20 at 15:45;  Stop 6/13/20 at 19:44;  Status DC


Potassium Acetate 40 meq/Magnesium Sulfate 5 meq/ Multivitamins 10 ml/Chromium/ 

Copper/Manganese/ Seleni/Zn 1 ml/ Insulin Human Regular 30 unit/ Total 

Parenteral Nutrition/Amino Acids/Dextrose/ Fat Emulsion Intravenous 1,920 ml @  

80 mls/hr TPN  CONT IV  Last administered on 6/14/20at 21:35;  Start 6/14/20 at 

22:00;  Stop 6/15/20 at 21:59;  Status DC


Potassium Chloride/Water 100 ml @  100 mls/hr Q1H IV  Last administered on 

6/15/20at 08:31;  Start 6/15/20 at 07:00;  Stop 6/15/20 at 08:59;  Status DC


Potassium Acetate 40 meq/Magnesium Sulfate 5 meq/ Multivitamins 10 ml/Chromium/ 

Copper/Manganese/ Seleni/Zn 1 ml/ Insulin Human Regular 30 unit/ Total 

Parenteral Nutrition/Amino Acids/Dextrose/ Fat Emulsion Intravenous 1,920 ml @  

80 mls/hr TPN  CONT IV  Last administered on 6/15/20at 21:54;  Start 6/15/20 at 

22:00;  Stop 6/16/20 at 19:34;  Status DC


Lidocaine HCl (Buffered Lidocaine 1%) 3 ml STK-MED ONCE .ROUTE ;  Start 6/15/20 

at 12:14;  Stop 6/15/20 at 12:14;  Status DC


Lidocaine HCl (Buffered Lidocaine 1%) 3 ml 1X  ONCE IJ  Last administered on 

6/15/20at 13:11;  Start 6/15/20 at 13:00;  Stop 6/15/20 at 13:01;  Status DC


Magnesium Sulfate 50 ml @ 25 mls/hr 1X  ONCE IV ;  Start 6/16/20 at 08:15;  Stop

6/16/20 at 10:14;  Status DC


Potassium Acetate 40 meq/Magnesium Sulfate 10 meq/ Multivitamins 10 ml/Chromium/

Copper/Manganese/ Seleni/Zn 1 ml/ Insulin Human Regular 20 unit/ Total 

Parenteral Nutrition/Amino Acids/Dextrose/ Fat Emulsion Intravenous 1,920 ml @  

80 mls/hr TPN  CONT IV  Last administered on 6/16/20at 21:32;  Start 6/16/20 at 

22:00;  Stop 6/17/20 at 21:59;  Status DC


Potassium Chloride/Water 100 ml @  100 mls/hr Q1H IV  Last administered on 

6/17/20at 09:12;  Start 6/17/20 at 08:00;  Stop 6/17/20 at 09:59;  Status DC


Alteplase, Recombinant (Cathflo For Central Catheter Clearance) 4 mg 1X  ONCE 

INT CAT ;  Start 6/17/20 at 09:15;  Stop 6/17/20 at 09:16;  Status UNV


Alteplase, Recombinant (Cathflo For Central Catheter Clearance) 4 mg 1X  ONCE 

INT CAT ;  Start 6/17/20 at 09:15;  Stop 6/17/20 at 09:16;  Status UNV


Alteplase, Recombinant (Cathflo For Central Catheter Clearance) 4 mg 1X  ONCE 

INT CAT ;  Start 6/17/20 at 09:15;  Stop 6/17/20 at 09:16;  Status UNV


Alteplase, Recombinant 4 mg/ Sodium Chloride 20 ml @ 20 mls/hr 1X  ONCE IV  Last

administered on 6/17/20at 10:10;  Start 6/17/20 at 10:00;  Stop 6/17/20 at 

10:59;  Status DC


Alteplase, Recombinant 4 mg/ Sodium Chloride 20 ml @ 20 mls/hr 1X  ONCE IV  Last

administered on 6/17/20at 10:09;  Start 6/17/20 at 10:00;  Stop 6/17/20 at 

10:59;  Status DC


Alteplase, Recombinant 4 mg/ Sodium Chloride 20 ml @ 20 mls/hr 1X  ONCE IV  Last

administered on 6/17/20at 10:09;  Start 6/17/20 at 10:00;  Stop 6/17/20 at 

10:59;  Status DC


Potassium Acetate 60 meq/Magnesium Sulfate 10 meq/ Multivitamins 10 ml/Chromium/

Copper/Manganese/ Seleni/Zn 1 ml/ Insulin Human Regular 20 unit/ Total Parent

eral Nutrition/Amino Acids/Dextrose/ Fat Emulsion Intravenous 1,920 ml @  80 

mls/hr TPN  CONT IV  Last administered on 6/17/20at 21:55;  Start 6/17/20 at 

22:00;  Stop 6/18/20 at 21:59;  Status DC


Albumin Human 500 ml @  125 mls/hr 1X  ONCE IV  Last administered on 6/18/20at 

12:01;  Start 6/18/20 at 11:15;  Stop 6/18/20 at 15:14;  Status DC


Sodium Chloride 500 ml @  500 mls/hr 1X  ONCE IV  Last administered on 6/18/20at

13:50;  Start 6/18/20 at 11:15;  Stop 6/18/20 at 12:14;  Status DC


Potassium Acetate 60 meq/Magnesium Sulfate 14 meq/ Multivitamins 10 ml/Chromium/

Copper/Manganese/ Seleni/Zn 1 ml/ Insulin Human Regular 20 unit/ Total 

Parenteral Nutrition/Amino Acids/Dextrose/ Fat Emulsion Intravenous 1,920 ml @  

80 mls/hr TPN  CONT IV  Last administered on 6/18/20at 22:26;  Start 6/18/20 at 

22:00;  Stop 6/19/20 at 21:59;  Status DC


Ciprofloxacin/ Dextrose 200 ml @  200 mls/hr Q12HR IV  Last administered on 

6/25/20at 08:27;  Start 6/18/20 at 21:00;  Stop 6/25/20 at 08:56;  Status DC


Albumin Human 250 ml @  62.5 mls/hr 1X  ONCE IV  Last administered on 6/19/20at 

11:09;  Start 6/19/20 at 11:00;  Stop 6/19/20 at 14:59;  Status DC


Furosemide (Lasix) 20 mg 1X  ONCE IVP  Last administered on 6/19/20at 14:52;  

Start 6/19/20 at 10:45;  Stop 6/19/20 at 10:49;  Status DC


Potassium Acetate 60 meq/Magnesium Sulfate 14 meq/ Multivitamins 10 ml/Chromium/

Copper/Manganese/ Seleni/Zn 1 ml/ Insulin Human Regular 15 unit/ Total 

Parenteral Nutrition/Amino Acids/Dextrose/ Fat Emulsion Intravenous 1,920 ml @  

80 mls/hr TPN  CONT IV  Last administered on 6/19/20at 22:08;  Start 6/19/20 at 

22:00;  Stop 6/20/20 at 21:59;  Status DC


Potassium Acetate 60 meq/Magnesium Sulfate 14 meq/ Multivitamins 10 ml/Chromium/

Copper/Manganese/ Seleni/Zn 1 ml/ Insulin Human Regular 15 unit/ Total Paren

teral Nutrition/Amino Acids/Dextrose/ Fat Emulsion Intravenous 1,920 ml @  80 

mls/hr TPN  CONT IV  Last administered on 6/20/20at 22:12;  Start 6/20/20 at 

22:00;  Stop 6/21/20 at 21:59;  Status DC


Potassium Acetate 60 meq/Magnesium Sulfate 14 meq/ Multivitamins 10 ml/Chromium/

Copper/Manganese/ Seleni/Zn 1 ml/ Insulin Human Regular 15 unit/ Total 

Parenteral Nutrition/Amino Acids/Dextrose/ Fat Emulsion Intravenous 1,920 ml @  

80 mls/hr TPN  CONT IV  Last administered on 6/21/20at 22:22;  Start 6/21/20 at 

22:00;  Stop 6/22/20 at 21:59;  Status DC


Furosemide (Lasix) 20 mg 1X  ONCE IVP  Last administered on 6/22/20at 11:07;  

Start 6/22/20 at 10:30;  Stop 6/22/20 at 10:34;  Status DC


Potassium Acetate 60 meq/Magnesium Sulfate 14 meq/ Multivitamins 10 ml/Chromium/

Copper/Manganese/ Seleni/Zn 1 ml/ Insulin Human Regular 15 unit/ Sodium Chloride

20 meq/Total Parenteral Nutrition/Amino Acids/Dextrose/ Fat Emulsion Intravenous

1,920 ml @  80 mls/hr TPN  CONT IV  Last administered on 6/22/20at 21:54;  Start

6/22/20 at 22:00;  Stop 6/23/20 at 21:59;  Status DC


Potassium Acetate 30 meq/Magnesium Sulfate 14 meq/ Multivitamins 10 ml/Chromium/

Copper/Manganese/ Seleni/Zn 1 ml/ Insulin Human Regular 15 unit/ Sodium Chloride

20 meq/Potassium Chloride 30 meq/ Total Parenteral Nutrition/Amino 

Acids/Dextrose/ Fat Emulsion Intravenous 1,920 ml @  80 mls/hr TPN  CONT IV  

Last administered on 6/23/20at 21:46;  Start 6/23/20 at 22:00;  Stop 6/24/20 at 

21:59;  Status DC


Sodium Chloride 80 meq/Potassium Chloride 30 meq/ Potassium Acetate 30 

meq/Magnesium Sulfate 14 meq/ Multivitamins 10 ml/Chromium/ Copper/Manganese/ 

Seleni/Zn 1 ml/ Insulin Human Regular 15 unit/ Total Parenteral Nutrition/Amino 

Acids/Dextrose/ Fat Emulsion Intravenous 1,920 ml @  80 mls/hr TPN  CONT IV  

Last administered on 6/24/20at 22:33;  Start 6/24/20 at 22:00;  Stop 6/25/20 at 

21:59;  Status DC


Furosemide (Lasix) 40 mg 1X  ONCE IVP  Last administered on 6/24/20at 16:27;  

Start 6/24/20 at 15:30;  Stop 6/24/20 at 15:33;  Status DC


Albumin Human 250 ml @  62.5 mls/hr 1X  ONCE IV  Last administered on 6/24/20at 

16:27;  Start 6/24/20 at 15:30;  Stop 6/24/20 at 19:29;  Status DC


Sodium Chloride 80 meq/Potassium Chloride 30 meq/ Potassium Acetate 30 

meq/Magnesium Sulfate 14 meq/ Multivitamins 10 ml/Chromium/ Copper/Manganese/ 

Seleni/Zn 1 ml/ Insulin Human Regular 15 unit/ Total Parenteral Nutrition/Amino 

Acids/Dextrose/ Fat Emulsion Intravenous 1,920 ml @  80 mls/hr TPN  CONT IV  

Last administered on 6/25/20at 22:25;  Start 6/25/20 at 22:00;  Stop 6/26/20 at 

21:59;  Status DC


Sodium Chloride 80 meq/Potassium Chloride 30 meq/ Potassium Acetate 30 

meq/Magnesium Sulfate 14 meq/ Multivitamins 10 ml/Chromium/ Copper/Manganese/ 

Seleni/Zn 1 ml/ Insulin Human Regular 15 unit/ Total Parenteral Nutrition/Amino 

Acids/Dextrose/ Fat Emulsion Intravenous 1,920 ml @  80 mls/hr TPN  CONT IV  

Last administered on 6/26/20at 21:32;  Start 6/26/20 at 22:00;  Stop 6/27/20 at 

21:59;  Status DC


Sodium Chloride 80 meq/Potassium Chloride 30 meq/ Potassium Acetate 30 

meq/Magnesium Sulfate 14 meq/ Multivitamins 10 ml/Chromium/ Copper/Manganese/ 

Seleni/Zn 1 ml/ Insulin Human Regular 15 unit/ Total Parenteral Nutrition/Amino 

Acids/Dextrose/ Fat Emulsion Intravenous 1,920 ml @  80 mls/hr TPN  CONT IV  

Last administered on 6/27/20at 21:53;  Start 6/27/20 at 22:00;  Stop 6/28/20 at 

21:59;  Status DC


Acetylcysteine (Mucomyst 20% Resp Treatment) 600 mg RTBID NEB  Last administered

on 8/13/20at 08:00;  Start 6/27/20 at 12:00


Sodium Chloride 80 meq/Potassium Chloride 30 meq/ Potassium Acetate 30 

meq/Magnesium Sulfate 14 meq/ Multivitamins 10 ml/Chromium/ Copper/Manganese/ 

Seleni/Zn 1 ml/ Insulin Human Regular 15 unit/ Total Parenteral Nutrition/Amino 

Acids/Dextrose/ Fat Emulsion Intravenous 1,920 ml @  80 mls/hr TPN  CONT IV  

Last administered on 6/28/20at 22:06;  Start 6/28/20 at 22:00;  Stop 6/29/20 at 

21:59;  Status DC


Meropenem 500 mg/ Sodium Chloride 50 ml @  100 mls/hr Q6HRS IV  Last a

dministered on 7/21/20at 06:15;  Start 6/28/20 at 18:00;  Stop 7/21/20 at 08:23;

 Status DC


Daptomycin 500 mg/ Sodium Chloride 50 ml @  100 mls/hr Q24H IV  Last 

administered on 7/6/20at 21:47;  Start 6/28/20 at 19:00;  Stop 7/7/20 at 08:13; 

Status DC


Sodium Chloride 80 meq/Potassium Chloride 30 meq/ Potassium Acetate 30 

meq/Magnesium Sulfate 14 meq/ Multivitamins 10 ml/Chromium/ Copper/Manganese/ 

Seleni/Zn 1 ml/ Insulin Human Regular 15 unit/ Total Parenteral Nutrition/Amino 

Acids/Dextrose/ Fat Emulsion Intravenous 1,920 ml @  80 mls/hr TPN  CONT IV  

Last administered on 6/29/20at 22:09;  Start 6/29/20 at 22:00;  Stop 6/30/20 at 

21:59;  Status DC


Heparin Sodium (Porcine) 1000 unit/Sodium Chloride 1,001 ml @  1,001 mls/hr 1X  

ONCE IRR ;  Start 6/30/20 at 06:00;  Stop 6/30/20 at 06:59;  Status DC


Propofol (Diprivan) 200 mg STK-MED ONCE IV ;  Start 6/30/20 at 07:44;  Stop 

6/30/20 at 07:44;  Status DC


Lidocaine HCl (Lidocaine Pf 2% Vial) 5 ml STK-MED ONCE .ROUTE ;  Start 6/30/20 

at 07:44;  Stop 6/30/20 at 07:44;  Status DC


Fentanyl Citrate (Fentanyl 2ml Vial) 100 mcg STK-MED ONCE .ROUTE ;  Start 

6/30/20 at 07:44;  Stop 6/30/20 at 07:44;  Status DC


Rocuronium Bromide (Zemuron) 100 mg STK-MED ONCE .ROUTE ;  Start 6/30/20 at 

07:44;  Stop 6/30/20 at 07:44;  Status DC


Micafungin Sodium 100 mg/Dextrose 100 ml @  100 mls/hr Q24H IV  Last 

administered on 8/4/20at 09:30;  Start 6/30/20 at 08:30;  Stop 8/5/20 at 08:20; 

Status DC


Bupivacaine HCl/ Epinephrine Bitart (Sensorcain-Epi 0.5%-1:[id number] Mpf) 30 ml 

STK-MED ONCE .ROUTE ;  Start 6/30/20 at 08:34;  Stop 6/30/20 at 08:35;  Status 

DC


Iohexol (Omnipaque 300 Mg/ml) 50 ml STK-MED ONCE .ROUTE  Last administered on 

6/30/20at 13:30;  Start 6/30/20 at 08:35;  Stop 6/30/20 at 08:35;  Status DC


Sodium Chloride 80 meq/Potassium Chloride 30 meq/ Potassium Acetate 30 

meq/Magnesium Sulfate 14 meq/ Multivitamins 10 ml/Chromium/ Copper/Manganese/ 

Seleni/Zn 1 ml/ Insulin Human Regular 15 unit/ Total Parenteral Nutrition/Amino 

Acids/Dextrose/ Fat Emulsion Intravenous 1,920 ml @  80 mls/hr TPN  CONT IV  

Last administered on 7/1/20at 01:22;  Start 6/30/20 at 22:00;  Stop 7/1/20 at 

21:59;  Status DC


Phenylephrine HCl (Ken-Synephrine Inj) 10 mg STK-MED ONCE .ROUTE ;  Start 

6/30/20 at 10:15;  Stop 6/30/20 at 10:15;  Status DC


Desflurane (Suprane) 90 ml STK-MED ONCE IH ;  Start 6/30/20 at 10:18;  Stop 

6/30/20 at 10:19;  Status DC


Albumin Human 500 ml @  As Directed STK-MED ONCE IV ;  Start 6/30/20 at 11:06;  

Stop 6/30/20 at 11:06;  Status DC


Vasopressin (Vasostrict) 20 unit STK-MED ONCE .ROUTE ;  Start 6/30/20 at 12:23; 

Stop 6/30/20 at 12:23;  Status DC


Phenylephrine HCl (Ken-Synephrine Inj) 10 mg STK-MED ONCE .ROUTE ;  Start 

6/30/20 at 13:33;  Stop 6/30/20 at 13:33;  Status DC


Phenylephrine HCl (Ken-Synephrine Inj) 10 mg STK-MED ONCE .ROUTE ;  Start 

6/30/20 at 13:33;  Stop 6/30/20 at 13:33;  Status DC


Ondansetron HCl (Zofran) 4 mg STK-MED ONCE .ROUTE ;  Start 6/30/20 at 13:33;  

Stop 6/30/20 at 13:33;  Status DC


Enoxaparin Sodium (Lovenox 40mg Syringe) 40 mg Q24H SQ  Last administered on 

8/12/20at 08:28;  Start 7/1/20 at 08:00


Sodium Chloride (Normal Saline Flush) 3 ml QSHIFT  PRN IV AFTER MEDS AND BLOOD 

DRAWS;  Start 6/30/20 at 14:45;  Stop 8/3/20 at 09:45;  Status DC


Naloxone HCl (Narcan) 0.4 mg PRN Q2MIN  PRN IV SEE INSTRUCTIONS;  Start 6/30/20 

at 14:45;  Stop 8/3/20 at 09:45;  Status DC


Sodium Chloride 1,000 ml @  25 mls/hr Q24H IV  Last administered on 8/2/20at 

20:55;  Start 6/30/20 at 14:33;  Stop 8/3/20 at 09:45;  Status DC


Morphine Sulfate (Morphine Sulfate) 1 mg PRN Q1HR  PRN IV PAIN Last administered

on 7/25/20at 18:28;  Start 6/30/20 at 14:45;  Stop 8/3/20 at 09:45;  Status DC


Midazolam HCl 100 mg/Sodium Chloride 100 ml @ 1 mls/hr CONT  PRN IV SEE I/O 

RECORD Last administered on 7/3/20at 18:48;  Start 6/30/20 at 14:45;  Stop 

8/3/20 at 09:45;  Status DC


Phenylephrine HCl (PHENYLEPHRINE in 0.9% NACL PF) 1 mg STK-MED ONCE IV ;  Start 

6/30/20 at 14:44;  Stop 6/30/20 at 14:45;  Status DC


Ephedrine Sulfate (ePHEDrine PF IN SALINE SYRINGE) 50 mg STK-MED ONCE IV ;  

Start 6/30/20 at 14:45;  Stop 6/30/20 at 14:45;  Status DC


Vasopressin 20 unit/Dextrose 101 ml @  12 mls/hr CONT  PRN IV SEE I/O RECORD 

Last administered on 7/7/20at 04:17;  Start 6/30/20 at 15:30;  Stop 8/3/20 at 

09:45;  Status DC


Sodium Chloride 1,000 ml @  1,000 mls/hr 1X  ONCE IV  Last administered on 

6/30/20at 15:42;  Start 6/30/20 at 15:45;  Stop 6/30/20 at 16:44;  Status DC


Albumin Human 500 ml @  125 mls/hr 1X  ONCE IV ;  Start 6/30/20 at 16:00;  Stop 

6/30/20 at 19:59;  Status DC


Albumin Human 500 ml @  125 mls/hr PRN Q1HR  PRN IV PER PROTOCOL;  Start 6/30/20

at 15:45;  Stop 8/3/20 at 09:52;  Status DC


Magnesium Sulfate 50 ml @ 25 mls/hr 1X  ONCE IV  Last administered on 6/30/20at 

17:02;  Start 6/30/20 at 16:30;  Stop 6/30/20 at 18:29;  Status DC


Sodium Bicarbonate (Sodium Bicarb Adult 8.4% Syr) 50 meq STK-MED ONCE .ROUTE ;  

Start 6/30/20 at 16:20;  Stop 6/30/20 at 16:20;  Status DC


Sodium Bicarbonate (Sodium Bicarb Adult 8.4% Syr) 100 meq 1X  ONCE IV  Last 

administered on 6/30/20at 17:07;  Start 6/30/20 at 16:30;  Stop 6/30/20 at 16:3

1;  Status DC


Sodium Bicarbonate 150 meq/Dextrose 1,150 ml @  75 mls/hr 1X  ONCE IV  Last 

administered on 6/30/20at 20:02;  Start 6/30/20 at 16:30;  Stop 7/1/20 at 07:49;

 Status DC


Sodium Chloride 80 meq/Potassium Chloride 30 meq/ Potassium Acetate 30 meq

/Magnesium Sulfate 14 meq/ Multivitamins 10 ml/Chromium/ Copper/Manganese/ 

Seleni/Zn 1 ml/ Insulin Human Regular 15 unit/ Total Parenteral Nutrition/Amino 

Acids/Dextrose/ Fat Emulsion Intravenous 1,920 ml @  80 mls/hr TPN  CONT IV  

Last administered on 7/1/20at 23:05;  Start 7/1/20 at 22:00;  Stop 7/2/20 at 

21:59;  Status DC


Sodium Chloride 100 meq/Potassium Chloride 30 meq/ Potassium Acetate 30 

meq/Magnesium Sulfate 12 meq/ Multivitamins 10 ml/Chromium/ Copper/Manganese/ 

Seleni/Zn 1 ml/ Insulin Human Regular 15 unit/ Total Parenteral Nutrition/Amino 

Acids/Dextrose/ Fat Emulsion Intravenous 1,920 ml @  80 mls/hr TPN  CONT IV  

Last administered on 7/2/20at 21:52;  Start 7/2/20 at 22:00;  Stop 7/3/20 at 

21:59;  Status DC


Sodium Chloride 100 meq/Potassium Chloride 30 meq/ Potassium Acetate 30 

meq/Magnesium Sulfate 12 meq/ Multivitamins 10 ml/Chromium/ Copper/Manganese/ 

Seleni/Zn 1 ml/ Insulin Human Regular 15 unit/ Total Parenteral Nutrition/Amino 

Acids/Dextrose/ Fat Emulsion Intravenous 1,920 ml @  80 mls/hr TPN  CONT IV  

Last administered on 7/3/20at 21:46;  Start 7/3/20 at 22:00;  Stop 7/4/20 at 

21:59;  Status DC


Sodium Chloride 100 meq/Potassium Chloride 30 meq/ Potassium Acetate 30 

meq/Magnesium Sulfate 12 meq/ Multivitamins 10 ml/Chromium/ Copper/Manganese/ 

Seleni/Zn 1 ml/ Insulin Human Regular 15 unit/ Total Parenteral Nutrition/Amino 

Acids/Dextrose/ Fat Emulsion Intravenous 1,800 ml @  75 mls/hr TPN  CONT IV  

Last administered on 7/4/20at 22:04;  Start 7/4/20 at 22:00;  Stop 7/5/20 at 

21:59;  Status DC


Fentanyl Citrate 55 ml @ 0 mls/hr CONT  PRN IV SEE COMMENTS Last administered on

7/6/20at 23:55;  Start 7/4/20 at 13:00;  Stop 7/9/20 at 17:28;  Status DC


Sodium Chloride 100 meq/Potassium Chloride 30 meq/ Potassium Acetate 30 

meq/Magnesium Sulfate 12 meq/ Multivitamins 10 ml/Chromium/ Copper/Manganese/ 

Seleni/Zn 1 ml/ Insulin Human Regular 15 unit/ Total Parenteral Nutrition/Amino 

Acids/Dextrose/ Fat Emulsion Intravenous 1,680 ml @  70 mls/hr TPN  CONT IV  

Last administered on 7/5/20at 21:23;  Start 7/5/20 at 22:00;  Stop 7/6/20 at 

21:59;  Status DC


Sodium Chloride 110 meq/Potassium Chloride 30 meq/ Potassium Acetate 30 

meq/Magnesium Sulfate 15 meq/ Multivitamins 10 ml/Chromium/ Copper/Manganese/ 

Seleni/Zn 1 ml/ Insulin Human Regular 15 unit/ Total Parenteral Nutrition/Amino 

Acids/Dextrose/ Fat Emulsion Intravenous 1,680 ml @  70 mls/hr TPN  CONT IV  

Last administered on 7/6/20at 21:48;  Start 7/6/20 at 22:00;  Stop 7/7/20 at 

21:59;  Status DC


Sodium Chloride 110 meq/Potassium Chloride 30 meq/ Potassium Acetate 30 

meq/Magnesium Sulfate 15 meq/ Multivitamins 10 ml/Chromium/ Copper/Manganese/ 

Seleni/Zn 1 ml/ Insulin Human Regular 15 unit/ Total Parenteral Nutrition/Amino 

Acids/Dextrose/ Fat Emulsion Intravenous 1,680 ml @  70 mls/hr TPN  CONT IV  

Last administered on 7/7/20at 21:33;  Start 7/7/20 at 22:00;  Stop 7/8/20 at 

21:59;  Status DC


Sodium Chloride 110 meq/Potassium Chloride 30 meq/ Potassium Acetate 30 

meq/Magnesium Sulfate 15 meq/ Multivitamins 10 ml/Chromium/ Copper/Manganese/ 

Seleni/Zn 1 ml/ Insulin Human Regular 15 unit/ Total Parenteral Nutrition/Amino 

Acids/Dextrose/ Fat Emulsion Intravenous 1,680 ml @  70 mls/hr TPN  CONT IV  

Last administered on 7/8/20at 21:51;  Start 7/8/20 at 22:00;  Stop 7/9/20 at 

21:59;  Status DC


Sodium Chloride 90 meq/Potassium Chloride 30 meq/ Potassium Acetate 30 

meq/Magnesium Sulfate 15 meq/ Multivitamins 10 ml/Chromium/ Copper/Manganese/ 

Seleni/Zn 1 ml/ Insulin Human Regular 15 unit/ Total Parenteral Nutrition/Amino 

Acids/Dextrose/ Fat Emulsion Intravenous 1,680 ml @  70 mls/hr TPN  CONT IV  

Last administered on 7/9/20at 22:38;  Start 7/9/20 at 22:00;  Stop 7/10/20 at 

21:59;  Status DC


Fentanyl Citrate 30 ml @ 0 mls/hr CONT  PRN IV SEE I/O RECORD;  Start 7/9/20 at 

17:30;  Stop 8/3/20 at 09:45;  Status DC


Fentanyl (Duragesic 12mcg/ Hr Patch) 1 patch Q3DAYS TD  Last administered on 

8/12/20at 08:28;  Start 7/10/20 at 09:00


Sodium Chloride 90 meq/Potassium Chloride 30 meq/ Potassium Acetate 30 

meq/Magnesium Sulfate 15 meq/ Multivitamins 10 ml/Chromium/ Copper/Manganese/ 

Seleni/Zn 1 ml/ Insulin Human Regular 15 unit/ Total Parenteral Nutrition/Amino 

Acids/Dextrose/ Fat Emulsion Intravenous 1,680 ml @  70 mls/hr TPN  CONT IV  

Last administered on 7/10/20at 21:59;  Start 7/10/20 at 22:00;  Stop 7/11/20 at 

21:59;  Status DC


Sodium Chloride 90 meq/Potassium Chloride 30 meq/ Potassium Acetate 30 

meq/Magnesium Sulfate 15 meq/ Multivitamins 10 ml/Chromium/ Copper/Manganese/ 

Seleni/Zn 1 ml/ Insulin Human Regular 15 unit/ Total Parenteral Nutrition/Amino 

Acids/Dextrose/ Fat Emulsion Intravenous 1,680 ml @  70 mls/hr TPN  CONT IV  

Last administered on 7/11/20at 21:35;  Start 7/11/20 at 22:00;  Stop 7/12/20 at 

21:59;  Status DC


Vancomycin HCl (Vanco Per Pharmacy) 1 each PRN DAILY  PRN MC SEE COMMENTS Last 

administered on 7/14/20at 02:46;  Start 7/12/20 at 09:15;  Stop 7/15/20 at 

07:41;  Status DC


Ciprofloxacin/ Dextrose 200 ml @  200 mls/hr Q12HR IV  Last administered on 

7/20/20at 21:02;  Start 7/12/20 at 10:00;  Stop 7/21/20 at 08:20;  Status DC


Vancomycin HCl 2 gm/Sodium Chloride 500 ml @  250 mls/hr 1X  ONCE IV  Last 

administered on 7/12/20at 10:34;  Start 7/12/20 at 10:00;  Stop 7/12/20 at 

11:59;  Status DC


Sodium Chloride 90 meq/Potassium Chloride 30 meq/ Potassium Acetate 30 

meq/Magnesium Sulfate 15 meq/ Multivitamins 10 ml/Chromium/ Copper/Manganese/ 

Seleni/Zn 1 ml/ Insulin Human Regular 15 unit/ Total Parenteral Nutrition/Amino 

Acids/Dextrose/ Fat Emulsion Intravenous 1,680 ml @  70 mls/hr TPN  CONT IV  

Last administered on 7/12/20at 22:02;  Start 7/12/20 at 22:00;  Stop 7/13/20 at 

21:59;  Status DC


Diphenhydramine HCl (Benadryl) 25 mg 1X  ONCE IVP  Last administered on 

7/12/20at 14:26;  Start 7/12/20 at 14:30;  Stop 7/12/20 at 14:31;  Status DC


Vancomycin HCl 1.5 gm/Sodium Chloride 500 ml @  250 mls/hr Q8H IV  Last 

administered on 7/13/20at 03:08;  Start 7/12/20 at 18:30;  Stop 7/13/20 at 

12:24;  Status DC


Vancomycin HCl (Vancomycin Trough Level) 1 each 1X  ONCE MC  Last administered 

on 7/13/20at 10:00;  Start 7/13/20 at 10:00;  Stop 7/13/20 at 10:01;  Status DC


Sodium Chloride 90 meq/Potassium Chloride 30 meq/ Potassium Acetate 30 

meq/Magnesium Sulfate 15 meq/ Multivitamins 10 ml/Chromium/ Copper/Manganese/ 

Seleni/Zn 1 ml/ Insulin Human Regular 15 unit/ Total Parenteral Nutrition/Amino 

Acids/Dextrose/ Fat Emulsion Intravenous 1,680 ml @  70 mls/hr TPN  CONT IV  

Last administered on 7/13/20at 22:13;  Start 7/13/20 at 22:00;  Stop 7/14/20 at 

21:59;  Status DC


Vancomycin HCl (Vancomycin Random Level) 1 each 1X  ONCE MC  Last administered 

on 7/14/20at 01:00;  Start 7/14/20 at 01:00;  Stop 7/14/20 at 01:01;  Status DC


Vancomycin HCl 1.5 gm/Sodium Chloride 500 ml @  250 mls/hr Q12H IV  Last 

administered on 7/14/20at 22:07;  Start 7/14/20 at 10:00;  Stop 7/15/20 at 

07:41;  Status DC


Vancomycin HCl (Vancomycin Trough Level) 1 each 1X  ONCE MC ;  Start 7/15/20 at 

09:30;  Stop 7/15/20 at 09:31;  Status Cancel


Sodium Chloride 90 meq/Potassium Chloride 30 meq/ Potassium Acetate 30 

meq/Magnesium Sulfate 15 meq/ Multivitamins 10 ml/Chromium/ Copper/Manganese/ 

Seleni/Zn 1 ml/ Insulin Human Regular 15 unit/ Total Parenteral Nutrition/Amino 

Acids/Dextrose/ Fat Emulsion Intravenous 1,680 ml @  70 mls/hr TPN  CONT IV  

Last administered on 7/14/20at 22:08;  Start 7/14/20 at 22:00;  Stop 7/15/20 at 

21:59;  Status DC


Alteplase, Recombinant (Cathflo For Central Catheter Clearance) 1 mg 1X  ONCE 

INT CAT  Last administered on 7/14/20at 11:49;  Start 7/14/20 at 11:00;  Stop 

7/14/20 at 11:01;  Status DC


Daptomycin 500 mg/ Sodium Chloride 50 ml @  100 mls/hr Q24H IV  Last 

administered on 7/24/20at 08:25;  Start 7/15/20 at 09:00;  Stop 7/24/20 at 

08:38;  Status DC


Sodium Chloride 90 meq/Potassium Chloride 30 meq/ Potassium Acetate 30 

meq/Magnesium Sulfate 15 meq/ Multivitamins 10 ml/Chromium/ Copper/Manganese/ 

Seleni/Zn 1 ml/ Insulin Human Regular 15 unit/ Total Parenteral Nutrition/Amino 

Acids/Dextrose/ Fat Emulsion Intravenous 1,680 ml @  70 mls/hr TPN  CONT IV  

Last administered on 7/15/20at 22:55;  Start 7/15/20 at 22:00;  Stop 7/16/20 at 

21:59;  Status DC


Sodium Chloride 90 meq/Potassium Chloride 30 meq/ Potassium Acetate 30 

meq/Magnesium Sulfate 15 meq/ Multivitamins 10 ml/Chromium/ Copper/Manganese/ 

Seleni/Zn 1 ml/ Insulin Human Regular 15 unit/ Total Parenteral Nutrition/Amino 

Acids/Dextrose/ Fat Emulsion Intravenous 1,680 ml @  70 mls/hr TPN  CONT IV  

Last administered on 7/16/20at 22:06;  Start 7/16/20 at 22:00;  Stop 7/17/20 at 

21:59;  Status DC


Diphenhydramine HCl (Benadryl) 50 mg STK-MED ONCE .ROUTE ;  Start 7/16/20 at 

18:34;  Stop 7/16/20 at 18:35;  Status DC


Diphenhydramine HCl (Benadryl) 25 mg 1X  ONCE IM ;  Start 7/16/20 at 18:45;  

Stop 7/16/20 at 18:46;  Status DC


Diphenhydramine HCl (Benadryl) 25 mg 1X  ONCE IVP  Last administered on 7 /16/20at 18:56;  Start 7/16/20 at 19:00;  Stop 7/16/20 at 19:01;  Status DC


Alprazolam (Xanax) 0.5 mg PRN TID  PRN PO ANXIETY / AGITATION Last administered 

on 7/23/20at 11:49;  Start 7/17/20 at 08:00;  Stop 7/23/20 at 15:54;  Status DC


Sodium Chloride 110 meq/Potassium Chloride 30 meq/ Potassium Acetate 30 

meq/Magnesium Sulfate 15 meq/ Multivitamins 10 ml/Chromium/ Copper/Manganese/ 

Seleni/Zn 1 ml/ Insulin Human Regular 15 unit/ Total Parenteral Nutrition/Amino 

Acids/Dextrose/ Fat Emulsion Intravenous 1,680 ml @  70 mls/hr TPN  CONT IV  

Last administered on 7/17/20at 21:21;  Start 7/17/20 at 22:00;  Stop 7/18/20 at 

21:59;  Status DC


Sodium Chloride 110 meq/Potassium Chloride 30 meq/ Potassium Acetate 30 

meq/Magnesium Sulfate 15 meq/ Multivitamins 10 ml/Chromium/ Copper/Manganese/ 

Seleni/Zn 1 ml/ Insulin Human Regular 15 unit/ Total Parenteral Nutrition/Amino 

Acids/Dextrose/ Fat Emulsion Intravenous 1,680 ml @  70 mls/hr TPN  CONT IV  

Last administered on 7/18/20at 22:01;  Start 7/18/20 at 22:00;  Stop 7/19/20 at 

21:59;  Status DC


Alteplase, Recombinant (Cathflo For Central Catheter Clearance) 1 mg 1X  ONCE 

INT CAT  Last administered on 7/19/20at 08:23;  Start 7/19/20 at 08:15;  Stop 

7/19/20 at 08:16;  Status DC


Sodium Chloride 110 meq/Sodium Phosphate 10 mmol/ Potassium Chloride 30 meq/ 

Potassium Acetate 30 meq/Magnesium Sulfate 15 meq/ Multivitamins 10 ml/Chromium/

Copper/Manganese/ Seleni/Zn 1 ml/ Insulin Human Regular 15 unit/ Total 

Parenteral Nutrition/Amino Acids/Dextrose/ Fat Emulsion Intravenous 1,680 ml @  

70 mls/hr TPN  CONT IV  Last administered on 7/19/20at 22:03;  Start 7/19/20 at 

22:00;  Stop 7/20/20 at 21:59;  Status DC


Sodium Chloride 120 meq/Sodium Phosphate 10 mmol/ Potassium Chloride 30 meq/ 

Potassium Acetate 30 meq/Magnesium Sulfate 15 meq/ Multivitamins 10 ml/Chromium/

Copper/Manganese/ Seleni/Zn 1 ml/ Insulin Human Regular 15 unit/ Total 

Parenteral Nutrition/Amino Acids/Dextrose/ Fat Emulsion Intravenous 1,680 ml @  

70 mls/hr TPN  CONT IV  Last administered on 7/20/20at 22:08;  Start 7/20/20 at 

22:00;  Stop 7/21/20 at 21:59;  Status DC


Ceftazidime/ Avibactam 2.5 gm/ Sodium Chloride 100 ml @  50 mls/hr Q8HRS IV  

Last administered on 7/22/20at 05:32;  Start 7/21/20 at 14:00;  Stop 7/22/20 at 

07:48;  Status DC


Alteplase, Recombinant (Cathflo For Central Catheter Clearance) 1 mg 1X  ONCE 

INT CAT  Last administered on 7/21/20at 09:30;  Start 7/21/20 at 09:30;  Stop 

7/21/20 at 09:31;  Status DC


Sodium Chloride 120 meq/Sodium Phosphate 10 mmol/ Potassium Chloride 30 meq/ 

Potassium Acetate 30 meq/Magnesium Sulfate 15 meq/ Multivitamins 10 ml/Chromium/

Copper/Manganese/ Seleni/Zn 1 ml/ Insulin Human Regular 15 unit/ Total Parent

eral Nutrition/Amino Acids/Dextrose/ Fat Emulsion Intravenous 1,680 ml @  70 

mls/hr TPN  CONT IV  Last administered on 7/21/20at 22:11;  Start 7/21/20 at 

22:00;  Stop 7/22/20 at 21:59;  Status DC


Iohexol (Omnipaque 300 Mg/ml) 75 ml 1X  ONCE IV  Last administered on 7/21/20at 

11:30;  Start 7/21/20 at 11:30;  Stop 7/21/20 at 11:31;  Status DC


Info (CONTRAST GIVEN -- Rx MONITORING) 1 each PRN DAILY  PRN MC SEE COMMENTS;  

Start 7/21/20 at 11:45;  Stop 7/23/20 at 11:44;  Status DC


Alteplase, Recombinant (Cathflo For Central Catheter Clearance) 1 mg 1X  ONCE 

INT CAT  Last administered on 7/21/20at 12:17;  Start 7/21/20 at 12:00;  Stop 

7/21/20 at 12:01;  Status DC


Cefepime HCl (Maxipime) 2 gm Q12HR IVP  Last administered on 7/22/20at 20:53;  

Start 7/22/20 at 09:00;  Stop 7/23/20 at 07:30;  Status DC


Sodium Chloride 120 meq/Sodium Phosphate 10 mmol/ Potassium Chloride 30 meq/ 

Potassium Acetate 30 meq/Magnesium Sulfate 15 meq/ Multivitamins 10 ml/Chromium/

Copper/Manganese/ Seleni/Zn 1 ml/ Insulin Human Regular 15 unit/ Total 

Parenteral Nutrition/Amino Acids/Dextrose/ Fat Emulsion Intravenous 1,680 ml @  

70 mls/hr TPN  CONT IV  Last administered on 7/22/20at 21:25;  Start 7/22/20 at 

22:00;  Stop 7/23/20 at 21:59;  Status DC


Ceftazidime/ Avibactam 2.5 gm/ Sodium Chloride 250 ml @  125 mls/hr Q8HRS IV  

Last administered on 8/5/20at 05:38;  Start 7/23/20 at 08:00;  Stop 8/5/20 at 

08:20;  Status DC


Sodium Chloride 120 meq/Sodium Phosphate 10 mmol/ Potassium Chloride 30 meq/ 

Potassium Acetate 30 meq/Magnesium Sulfate 15 meq/ Multivitamins 10 ml/Chromium/

Copper/Manganese/ Seleni/Zn 1 ml/ Insulin Human Regular 15 unit/ Total 

Parenteral Nutrition/Amino Acids/Dextrose/ Fat Emulsion Intravenous 1,680 ml @  

70 mls/hr TPN  CONT IV  Last administered on 7/23/20at 22:35;  Start 7/23/20 at 

22:00;  Stop 7/24/20 at 21:59;  Status DC


Alprazolam (Xanax) 0.5 mg PRN QID  PRN PO ANXIETY / AGITATION Last administered 

on 8/13/20at 03:25;  Start 7/23/20 at 16:00


Acetaminophen/ Hydrocodone Bitart (Lortab 5/325) 1 tab PRN Q4HRS  PRN PO PAIN 

Last administered on 8/13/20at 12:25;  Start 7/23/20 at 16:00


Sodium Chloride 120 meq/Sodium Phosphate 10 mmol/ Potassium Chloride 30 meq/ 

Potassium Acetate 30 meq/Magnesium Sulfate 15 meq/ Multivitamins 10 ml/Chromium/

Copper/Manganese/ Seleni/Zn 1 ml/ Insulin Human Regular 15 unit/ Total 

Parenteral Nutrition/Amino Acids/Dextrose/ Fat Emulsion Intravenous 1,680 ml @  

70 mls/hr TPN  CONT IV  Last administered on 7/24/20at 21:50;  Start 7/24/20 at 

22:00;  Stop 7/25/20 at 21:59;  Status DC


Sodium Chloride 120 meq/Sodium Phosphate 10 mmol/ Potassium Chloride 30 meq/ 

Potassium Acetate 30 meq/Magnesium Sulfate 15 meq/ Multivitamins 10 ml/Chromium/

Copper/Manganese/ Seleni/Zn 1 ml/ Insulin Human Regular 15 unit/ Total 

Parenteral Nutrition/Amino Acids/Dextrose/ Fat Emulsion Intravenous 1,680 ml @  

70 mls/hr TPN  CONT IV  Last administered on 7/25/20at 22:14;  Start 7/25/20 at 

22:00;  Stop 7/26/20 at 21:59;  Status DC


Daptomycin 500 mg/ Sodium Chloride 50 ml @  100 mls/hr Q24H IV  Last 

administered on 8/4/20at 09:20;  Start 7/26/20 at 09:00;  Stop 8/5/20 at 08:20; 

Status DC


Sodium Chloride 120 meq/Sodium Phosphate 10 mmol/ Potassium Chloride 30 meq/ 

Potassium Acetate 30 meq/Magnesium Sulfate 15 meq/ Multivitamins 10 ml/Chromium/

Copper/Manganese/ Seleni/Zn 1 ml/ Insulin Human Regular 15 unit/ Total 

Parenteral Nutrition/Amino Acids/Dextrose/ Fat Emulsion Intravenous 1,680 ml @  

70 mls/hr TPN  CONT IV ;  Start 7/26/20 at 22:00;  Stop 7/27/20 at 21:59;  

Status Cancel


Amino Acids/ Glycerin/ Electrolytes 1,000 ml @  80 mls/hr [id number] IV  Last 

administered on 8/1/20at 18:10;  Start 7/26/20 at 10:15;  Stop 8/2/20 at 07:07; 

Status DC


Iohexol (Omnipaque 300 Mg/ml) 50 ml 1X  ONCE IJ ;  Start 7/28/20 at 11:00;  Stop

7/28/20 at 11:01;  Status DC


Sodium Chloride 500 ml @  500 mls/hr 1X  ONCE IV  Last administered on 7/28/20at

18:30;  Start 7/28/20 at 18:30;  Stop 7/28/20 at 19:29;  Status DC


Lidocaine HCl (Buffered Lidocaine 1%) 3 ml 1X  ONCE INJ ;  Start 7/30/20 at 

12:15;  Stop 7/30/20 at 12:17;  Status DC


Fentanyl Citrate (Fentanyl 2ml Vial) 25 mcg PRN Q6HRS  PRN IVP SEE INSTUCTIONS 

Last administered on 8/9/20at 06:00;  Start 8/3/20 at 10:00


Sodium Chloride 1,000 ml @  125 mls/hr Q8H IV  Last administered on 8/3/20at 

23:16;  Start 8/3/20 at 23:21;  Stop 8/4/20 at 09:23;  Status DC


Sodium Chloride 1,000 ml @  350 mls/hr 1X  ONCE IV  Last administered on 

8/3/20at 20:29;  Start 8/3/20 at 20:30;  Stop 8/3/20 at 23:21;  Status DC


Vitamin A/Vitamin D (Vitamin A & D Ointment) 1 thomas PRN Q1HR  PRN TP SKIN 

PROTECTION Last administered on 8/13/20at 08:36;  Start 8/4/20 at 09:15


Iohexol (Omnipaque 240 Mg/ml) 50 ml STK-MED ONCE .ROUTE ;  Start 8/4/20 at 

14:18;  Stop 8/4/20 at 14:19;  Status DC


Lidocaine HCl (Buffered Lidocaine 1%) 3 ml STK-MED ONCE .ROUTE ;  Start 8/4/20 

at 14:19;  Stop 8/4/20 at 14:19;  Status DC


Fentanyl Citrate (Fentanyl 2ml Vial) 100 mcg STK-MED ONCE .ROUTE ;  Start 8/4/20

at 15:03;  Stop 8/4/20 at 15:03;  Status DC


Lidocaine HCl (Buffered Lidocaine 1%) 5 ml 1X  ONCE INJ  Last administered on 

8/4/20at 15:00;  Start 8/4/20 at 15:45;  Stop 8/4/20 at 15:46;  Status DC


Iohexol (Omnipaque 240 Mg/ml) 10 ml 1X  ONCE IJ  Last administered on 8/4/20at 

15:00;  Start 8/4/20 at 15:45;  Stop 8/4/20 at 15:46;  Status DC


Multivitamins/ Minerals Therapeutic (Centrum Multivit-Mineral Liq) 5 ml DAILY 

PEG  Last administered on 8/13/20at 08:43;  Start 8/5/20 at 09:00


Alteplase, Recombinant 5 mg/ Sodium Chloride 30 ml @ 30 mls/hr 1X  PRN IV SEE 

COMMENTS;  Start 8/5/20 at 06:45;  Status UNV


Alteplase, Recombinant (Cathflo For Central Catheter Clearance) 1 mg 1X  ONCE 

INT CAT  Last administered on 8/5/20at 09:30;  Start 8/5/20 at 07:00;  Stop 

8/5/20 at 07:01;  Status DC


Sodium Chloride 1,000 ml @  125 mls/hr 1X  ONCE IV  Last administered on 

8/5/20at 16:12;  Start 8/5/20 at 14:30;  Stop 8/5/20 at 22:29;  Status DC


Furosemide (Lasix) 40 mg 1X  ONCE IVP  Last administered on 8/5/20at 16:17;  

Start 8/5/20 at 14:30;  Stop 8/5/20 at 14:33;  Status DC


Furosemide (Lasix) 40 mg [id number] IVP  Last administered on 8/6/20at 13:51;  Start 

8/6/20 at 09:00;  Stop 8/6/20 at 14:01;  Status DC


Sodium Chloride 1,000 ml @  125 mls/hr 1X  ONCE IV  Last administered on 

8/6/20at 08:20;  Start 8/6/20 at 07:45;  Stop 8/6/20 at 15:44;  Status DC


Calcitonin [place] (Miacalcin) 400 unit BID SQ  Last administered on 8/6/20at 

18:18;  Start 8/6/20 at 18:00;  Stop 8/7/20 at 15:56;  Status DC


Zoledronic Acid 100 ml @  400 mls/hr 1X  ONCE IV  Last administered on 8/7/20at 

13:04;  Start 8/7/20 at 12:00;  Stop 8/7/20 at 12:14;  Status DC


Metoprolol Tartrate (Lopressor Vial) 5 mg 1X  ONCE IVP  Last administered on 

8/9/20at 10:54;  Start 8/9/20 at 10:45;  Stop 8/9/20 at 10:46;  Status DC


Cefepime HCl (Maxipime) 2 gm Q12HR IVP  Last administered on 8/13/20at 08:38;  

Start 8/10/20 at 10:00


Metronidazole 100 ml @  100 mls/hr Q12HR IV  Last administered on 8/13/20at 

08:43;  Start 8/10/20 at 10:00


Sodium Chloride 1,000 ml @  75 mls/hr [id number] IV  Last administered on 8/11/20at

09:55;  Start 8/10/20 at 18:45;  Stop 8/12/20 at 11:12;  Status DC


Sodium Chloride 1,000 ml @  1,000 mls/hr 1X  ONCE IV  Last administered on 

8/11/20at 14:00;  Start 8/11/20 at 14:00;  Stop 8/11/20 at 14:59;  Status DC


Ringer's Solution 1,000 ml @  175 mls/hr [id number] IV  Last administered on 

8/13/20at 06:27;  Start 8/11/20 at 14:30


Sodium Bicarbonate (Sodium Bicarb Adult 8.4% Syr) 100 meq 1X  ONCE IV  Last 

administered on 8/11/20at 14:32;  Start 8/11/20 at 14:30;  Stop 8/11/20 at 

14:35;  Status DC


Sodium Bicarbonate (Sodium Bicarb Adult 8.4% Syr) 50 meq STK-MED ONCE .ROUTE ;  

Start 8/11/20 at 14:30;  Stop 8/11/20 at 14:30;  Status DC


Sodium Chloride 1,000 ml @  1,000 mls/hr 1X  ONCE IV  Last administered on 

8/12/20at 09:18;  Start 8/12/20 at 08:45;  Stop 8/12/20 at 09:44;  Status DC


Sodium Chloride 1,000 ml @  1,000 mls/hr 1X  ONCE IV  Last administered on 

8/12/20at 17:17;  Start 8/12/20 at 17:15;  Stop 8/12/20 at 18:14;  Status DC


Sodium Chloride 1,000 ml @  1,000 mls/hr 1X  ONCE IV  Last administered on 

8/13/20at 09:39;  Start 8/13/20 at 09:45;  Stop 8/13/20 at 10:44;  Status DC





Active Scripts


Active


Reported


Bisoprolol Fumarate 5 Mg Tablet 10 Mg PO DAILY


Vitals/I & O





Vital Sign - Last 24 Hours








 8/12/20 8/12/20 8/12/20 8/12/20





 12:42 13:42 15:10 16:27


 


Temp   98.6 





   98.6 


 


Pulse   134 


 


Resp   28 


 


B/P (MAP)   104/61 (75) 


 


Pulse Ox 100 100 95 98


 


O2 Delivery Nasal Cannula Nasal Cannula Room Air Room Air


 


O2 Flow Rate 2.0 2.0  


 


    





    





 8/12/20 8/12/20 8/12/20 8/12/20





 19:35 19:36 19:51 19:55


 


Temp    97.4





    97.4


 


Pulse    121


 


Resp    28


 


B/P (MAP)    104/60 (75)


 


Pulse Ox 100 100  98


 


O2 Delivery Nasal Cannula Nasal Cannula Nasal Cannula Nasal Cannula


 


O2 Flow Rate 1.5 1.5 2.0 1.5


 


    





    





 8/12/20 8/12/20 8/12/20 8/13/20





 20:49 21:49 23:50 00:19


 


Temp   97.5 





   97.5 


 


Pulse   116 


 


Resp 38 38 22 


 


B/P (MAP)   94/49 (64) 


 


Pulse Ox 98 98 98 100


 


O2 Delivery Nasal Cannula Nasal Cannula Nasal Cannula Nasal Cannula


 


O2 Flow Rate 1.5 1.5 1.5 1.5


 


    





    





 8/13/20 8/13/20 8/13/20 8/13/20





 02:21 03:26 04:09 04:26


 


Temp 98.3   





 98.3   


 


Pulse 126   


 


Resp 24 40  36


 


B/P (MAP) 97/52 (67)   


 


Pulse Ox 98 98 100 100


 


O2 Delivery Nasal Cannula Nasal Cannula Nasal Cannula Nasal Cannula


 


O2 Flow Rate 1.5 1.5 1.5 1.5


 


    





    





 8/13/20 8/13/20 8/13/20 8/13/20





 07:00 08:07 08:08 10:37


 


Temp 97.8   97.6





 97.8   97.6


 


Pulse 64   120


 


Resp 24   24


 


B/P (MAP) 115/67 (83)   105/35 (58)


 


Pulse Ox 97 100 100 98


 


O2 Delivery Nasal Cannula Nasal Cannula Nasal Cannula Nasal Cannula


 


O2 Flow Rate 1.5 1.5 1.5 1.5


 


    





    





 8/13/20 8/13/20  





 11:44 12:25  


 


Pulse Ox  98  


 


O2 Delivery Nasal Cannula Nasal Cannula  


 


O2 Flow Rate 1.5 1.5  














Intake and Output   


 


 8/12/20 8/12/20 8/13/20





 15:00 23:00 07:00


 


Intake Total  2968 ml 935 ml


 


Output Total 650 ml 350 ml 1050 ml


 


Balance -650 ml 2618 ml -115 ml











Justicifation of Admission Dx:


Justifications for Admission:


Justification of Admission Dx:  Yes











MG ARGUETA MD                 Aug 13, 2020 12:41

## 2020-08-13 NOTE — NUR
Reported Hgb 7.0, HCT 23.4 to Dr Gregorio this morning. No orders at this time and will 
reevaluate Hgb and HCT later this morning.

## 2020-08-13 NOTE — NUR
Cleaned inner canula to trach using sterile technique. Cleared thick yellow mucous from 
inner canula and placed back into trach. Patient tolerated well.

## 2020-08-13 NOTE — PDOC
Date of Service:


DATE: 8/13/20 


TIME: 12:25





Objective:


Objective:


Nurse called yesterday w/ change in drainage, advised to contact surgery.


Reviewed chart - liquid stool mentioned.


Vital Signs:





                                   Vital Signs








  Date Time  Temp Pulse Resp B/P (MAP) Pulse Ox O2 Delivery O2 Flow Rate FiO2


 


8/13/20 11:44      Nasal Cannula 1.5 


 


8/13/20 10:37 97.6 120 24 105/35 (58) 98   





 97.6       








Labs:





Laboratory Tests








Test


 8/12/20


17:40 8/13/20


00:26 8/13/20


06:58 8/13/20


11:14


 


Glucose (Fingerstick)


 156 mg/dL


(70-99) 157 mg/dL


(70-99) 192 mg/dL


(70-99) 144 mg/dL


(70-99)











PE:





GEN: chronically ill


LUNGS: breathing treatment/mask


NEURO/PSYCH: lethargic





A/P:


S/p pancreatic necrosectomy


Anemia - transfusion ordered





--


Continue support.





Justicifation of Admission Dx:


Justifications for Admission:


Justification of Admission Dx:  Yes











RAGHAVENDRA MURCIA         Aug 13, 2020 12:28

## 2020-08-13 NOTE — PDOC
Infectious Disease Note


Subjective:


Subjective


Pt lethargic, arousable


afebrile last 48 hours


Continues to have liquid stool





Vital Signs:


Vital Signs





Vital Signs








  Date Time  Temp Pulse Resp B/P (MAP) Pulse Ox O2 Delivery O2 Flow Rate FiO2


 


8/13/20 08:08     100 Nasal Cannula 1.5 


 


8/13/20 07:00 97.8 64 24 115/67 (83)    





 97.8       











Physical Exam:


PHYSICAL EXAM


GENERAL: Resting in bed, NAD


HEENT: Pupils equal, oral cavity dry. OC/Op - Dry


NECK:  Tracheostomy capped


LUNGS: Diminished aeration bases,


HEART:  S1, S2, 


ABDOMEN: Less distended, mild- bowel sounds hypoactive, soft, GJ drain present, 

drainage bag in place with depedent drainage


: [place] in place 


EXTREMITIES: Trace generalized edema, no cyanosis. Yeast in groin area


SKIN: warm touch. No signs of rash.  


NEURO: - Alert and responsive


RUE  PICC site without signs of complications. PIV s clean


EC fistula





Medications:


Inpatient Meds:





Current Medications








 Medications


  (Trade)  Dose


 Ordered  Sig/Yee  Start Time


 Stop Time Status Last Admin


Dose Admin


 


 Acetaminophen


  (Tylenol Supp)  650 mg  PRN Q6HRS  PRN  3/24/20 10:30


    8/10/20 15:43


650 MG


 


 Acetaminophen


  (Tylenol)  650 mg  PRN Q6HRS  PRN  3/21/20 03:36


 5/13/20 10:25 DC 4/16/20 19:56


650 MG


 


 Acetaminophen/


 Hydrocodone Bitart


  (Lortab 5/325)  1 tab  PRN Q4HRS  PRN  7/23/20 16:00


    8/13/20 03:26


1 TAB


 


 Acetylcysteine


  (Mucomyst 20%


 Resp Treatment)  600 mg  RTBID  6/27/20 12:00


    8/13/20 08:00


600 MG


 


 Albumin Human  500 ml @ 


 125 mls/hr  PRN Q1HR  PRN  6/30/20 15:45


 8/3/20 09:52 DC  





 


 Albuterol Sulfate


  (Ventolin Neb


 Soln)  2.5 mg  1X  ONCE  3/17/20 22:30


 3/17/20 22:31 DC 3/18/20 00:56


2.5 MG


 


 Albuterol/


 Ipratropium


  (Duoneb)  3 ml  Q4HRS  6/13/20 08:00


    8/13/20 08:03


3 ML


 


 Alprazolam


  (Xanax)  0.5 mg  PRN QID  PRN  7/23/20 16:00


    8/13/20 03:25


0.5 MG


 


 Alteplase,


 Recombinant


  (Cathflo For


 Central Catheter


 Clearance)  1 mg  1X  ONCE  8/5/20 07:00


 8/5/20 07:01 DC 8/5/20 09:30


1 MG


 


 Alteplase,


 Recombinant 4 mg/


 Sodium Chloride  20 ml @ 20


 mls/hr  1X  ONCE  6/17/20 10:00


 6/17/20 10:59 DC 6/17/20 10:09


20 MLS/HR


 


 Alteplase,


 Recombinant 5 mg/


 Sodium Chloride  30 ml @ 30


 mls/hr  1X  PRN  8/5/20 06:45


   UNV  





 


 Amino Acids/


 Glycerin/


 Electrolytes  1,000 ml @ 


 80 mls/hr  [id number]  7/26/20 10:15


 8/2/20 07:07 DC 8/1/20 18:10


80 MLS/HR


 


 Artificial Tears


  (Artificial


 Tears)  1 drop  PRN Q15MIN  PRN  4/29/20 05:30


    6/23/20 21:17


1 DROP


 


 Atenolol


  (Tenormin)  100 mg  DAILY  3/17/20 09:00


 3/16/20 20:08 DC  





 


 Atropine Sulfate


  (ATROPINE 0.5mg


 SYRINGE)  0.5 mg  PRN Q5MIN  PRN  4/2/20 08:15


 8/3/20 09:45 DC  





 


 Barium Sulfate


  (Varibar Thin


 Liquid Apple)  148 gm  1X  ONCE  5/26/20 11:45


 5/26/20 11:49 DC  





 


 Benzocaine


  (Hurricaine One)  1 spray  1X  ONCE  3/20/20 14:30


 3/20/20 14:31 DC 3/20/20 16:38


1 SPRAY


 


 Bisacodyl


  (Dulcolax Supp)  10 mg  STK-MED ONCE  4/27/20 10:59


 4/27/20 10:59 DC  





 


 Bumetanide


  (Bumex)  2 mg  DAILY  5/8/20 10:00


 5/18/20 17:15 DC 5/18/20 08:07


2 MG


 


 Bupivacaine HCl/


 Epinephrine Bitart


  (Sensorcain-Epi


 0.5%-1:268957 Mpf)  30 ml  STK-MED ONCE  6/30/20 08:34


 6/30/20 08:35 DC  





 


 Calcitonin [place]


  (Miacalcin)  400 unit  BID  8/6/20 18:00


 8/7/20 15:56 DC 8/6/20 18:18


400 UNIT


 


 Calcium Carbonate/


 Glycine


  (Tums)  500 mg  PRN [id number]  PRN  3/18/20 17:45


 5/13/20 10:25 DC  





 


 Calcium Chloride


 1000 mg/Sodium


 Chloride  110 ml @ 


 220 mls/hr  1X  ONCE  3/17/20 22:30


 3/17/20 22:59 DC 3/17/20 22:11


220 MLS/HR


 


 Calcium Chloride


 3000 mg/Sodium


 Chloride  1,030 ml @ 


 50 mls/hr  [id number]  3/19/20 08:00


 3/21/20 15:23 DC 3/21/20 02:17


50 MLS/HR


 


 Calcium Gluconate


  (Calcium


 Gluconate)  2,000 mg  1X  ONCE  3/19/20 02:15


 3/19/20 02:16 DC 3/19/20 02:19


2,000 MG


 


 Calcium Gluconate


 1000 mg/Sodium


 Chloride  110 ml @ 


 220 mls/hr  1X  ONCE  3/18/20 03:30


 3/18/20 03:59 DC 3/18/20 03:21


220 MLS/HR


 


 Calcium Gluconate


 2000 mg/Sodium


 Chloride  120 ml @ 


 220 mls/hr  1X  ONCE  3/18/20 07:30


 3/18/20 08:02 DC 3/18/20 09:05


220 MLS/HR


 


 Cefepime HCl


  (Maxipime)  2 gm  Q12HR  8/10/20 10:00


    8/12/20 20:49


2 GM


 


 Ceftazidime/


 Avibactam 2.5 gm/


 Sodium Chloride  250 ml @ 


 125 mls/hr  Q8HRS  7/23/20 08:00


 8/5/20 08:20 DC 8/5/20 05:38


125 MLS/HR


 


 Cellulose


  (Surgicel


 Fibrillar 1x2)  1 each  STK-MED ONCE  4/6/20 11:00


 4/6/20 11:01 DC  





 


 Cellulose


  (Surgicel


 Hemostat 2x14)  1 each  STK-MED ONCE  4/27/20 10:58


 4/27/20 10:59 DC  





 


 Cellulose


  (Surgicel


 Hemostat 4x8)  1 each  STK-MED ONCE  4/27/20 10:58


 4/27/20 10:59 DC  





 


 Chlorhexidine


 Gluconate


  (Peridex)  15 ml  BID  6/13/20 09:00


 6/13/20 07:58 DC  





 


 Ciprofloxacin/


 Dextrose  200 ml @ 


 200 mls/hr  Q12HR  7/12/20 10:00


 7/21/20 08:20 DC 7/20/20 21:02


200 MLS/HR


 


 Cyclobenzaprine


 HCl


  (Flexeril)  10 mg  PRN Q6HRS  PRN  4/30/20 10:45


    8/8/20 20:50


10 MG


 


 Daptomycin 410 mg/


 Sodium Chloride  50 ml @ 


 100 mls/hr  Q24H  6/7/20 14:00


 6/10/20 08:30 DC 6/9/20 13:33


100 MLS/HR


 


 Daptomycin 430 mg/


 Sodium Chloride  50 ml @ 


 100 mls/hr  Q24H  4/25/20 13:00


 4/30/20 20:58 DC 4/30/20 13:00


100 MLS/HR


 


 Daptomycin 450 mg/


 Sodium Chloride  50 ml @ 


 100 mls/hr  Q24H  5/17/20 09:00


 5/21/20 08:30 DC 5/20/20 09:25


100 MLS/HR


 


 Daptomycin 485 mg/


 Sodium Chloride  50 ml @ 


 100 mls/hr  Q24H  5/4/20 11:00


 5/12/20 07:44 DC 5/11/20 13:10


100 MLS/HR


 


 Daptomycin 500 mg/


 Sodium Chloride  50 ml @ 


 100 mls/hr  Q24H  7/26/20 09:00


 8/5/20 08:20 DC 8/4/20 09:20


100 MLS/HR


 


 Desflurane


  (Suprane)  90 ml  STK-MED ONCE  6/30/20 10:18


 6/30/20 10:19 DC  





 


 Dexamethasone


 Sodium Phosphate


  (Decadron)  4 mg  STK-MED ONCE  4/27/20 10:56


 4/27/20 10:57 DC  





 


 Dexmedetomidine


 HCl 400 mcg/


 Sodium Chloride  100 ml @ 0


 mls/hr  CONT  PRN  4/2/20 08:15


 5/30/20 18:31 DC 5/30/20 12:57


8 MLS/HR


 


 Dextrose


  (Dextrose


 50%-Water Syringe)  12.5 gm  PRN Q15MIN  PRN  3/16/20 09:30


     





 


 Digoxin


  (Lanoxin)  125 mcg  1X  ONCE  3/19/20 18:00


 3/19/20 18:01 DC 3/19/20 17:10


125 MCG


 


 Diphenhydramine


 HCl


  (Benadryl)  25 mg  1X  ONCE  7/16/20 19:00


 7/16/20 19:01 DC 7/16/20 18:56


25 MG


 


 Duloxetine HCl


  (Cymbalta)  30 mg  DAILY  5/10/20 14:00


 5/13/20 10:25 DC 5/11/20 09:48


30 MG


 


 Enoxaparin Sodium


  (Lovenox 100mg


 Syringe)  100 mg  Q12HR  4/21/20 21:00


   UNV  





 


 Enoxaparin Sodium


  (Lovenox 40mg


 Syringe)  40 mg  Q24H  7/1/20 08:00


    8/12/20 08:28


40 MG


 


 Ephedrine Sulfate


  (ePHEDrine PF IN


 SALINE SYRINGE)  50 mg  STK-MED ONCE  6/30/20 14:45


 6/30/20 14:45 DC  





 


 Etomidate


  (Amidate)  8 mg  1X  ONCE  3/23/20 08:30


 3/23/20 08:31 DC 3/23/20 08:33


8 MG


 


 Fentanyl


  (Duragesic 12mcg/


 Hr Patch)  1 patch  Q3DAYS  7/10/20 09:00


    8/12/20 08:28


1 PATCH


 


 Fentanyl


  (Duragesic 50mcg/


 Hr Patch)  1 patch  Q72H  6/4/20 21:00


 6/13/20 12:00 DC 6/4/20 21:22


1 PATCH


 


 Fentanyl Citrate


  (Fentanyl 2ml


 Vial)  100 mcg  STK-MED ONCE  8/4/20 15:03


 8/4/20 15:03 DC  





 


 Fentanyl Citrate


  (Fentanyl 5ml


 Vial)  250 mcg  1X  ONCE  5/8/20 09:15


 5/8/20 09:16 DC 5/8/20 09:30


50 MCG


 


 Flumazenil


  (Romazicon)  0.5 mg  STK-MED ONCE  6/7/20 14:48


 6/7/20 14:48 DC  





 


 Fluoxetine HCl


  (PROzac)  20 mg  QHS  6/4/20 21:00


    8/12/20 20:48


20 MG


 


 Furosemide


  (Lasix)  40 mg  [id number]  8/6/20 09:00


 8/6/20 14:01 DC 8/6/20 13:51


40 MG


 


 Haloperidol


 Lactate


  (Haldol Inj)  3 mg  1X  ONCE  5/4/20 14:30


 5/4/20 14:31 DC 5/4/20 14:37


3 MG


 


 Heparin Sodium


  (Porcine)


  (Hep Lock Adult)  500 unit  STK-MED ONCE  4/7/20 09:29


 4/7/20 09:30 DC  





 


 Heparin Sodium


  (Porcine)


  (Heparin Sodium)  5,000 unit  Q12HR  4/27/20 21:00


 5/7/20 09:59 DC 5/6/20 20:57


5,000 UNIT


 


 Heparin Sodium


  (Porcine) 1000


 unit/Sodium


 Chloride  1,001 ml @ 


 1,001 mls/hr  1X  ONCE  6/30/20 06:00


 6/30/20 06:59 DC  





 


 Hydromorphone HCl


  (Dilaudid


 Standard PCA)  12 mg  STK-MED ONCE  5/1/20 15:50


 5/12/20 11:24 DC  





 


 Hydromorphone HCl


  (Dilaudid)  1 mg  PRN Q4HRS  PRN  5/4/20 19:00


 5/18/20 17:10 DC 5/18/20 06:25


1 MG


 


 Info


  (CONTRAST GIVEN


 -- Rx MONITORING)  1 each  PRN DAILY  PRN  7/21/20 11:45


 7/23/20 11:44 DC  





 


 Info


  (Icu Electrolyte


 Protocol)  1 ea  CONT PRN  PRN  3/29/20 13:15


     





 


 Info


  (PHARMACY


 MONITORING -- do


 not chart)  1 each  PRN DAILY  PRN  4/24/20 15:45


 5/26/20 14:14 DC  





 


 Info


  (Tpn Per


 Pharmacy)  1 each  PRN DAILY  PRN  3/18/20 12:30


   UNV  





 


 Insulin Human


 Lispro


  (HumaLOG)  0-9 UNITS  Q6HRS  3/16/20 09:30


    8/10/20 19:01


3 UNITS


 


 Insulin Human


 Regular


  (HumuLIN R VIAL)  5 unit  1X  ONCE  3/17/20 22:30


 3/17/20 22:31 DC 3/17/20 22:14


5 UNIT


 


 Iohexol


  (Omnipaque 240


 Mg/ml)  10 ml  1X  ONCE  8/4/20 15:45


 8/4/20 15:46 DC 8/4/20 15:00


10 ML


 


 Iohexol


  (Omnipaque 300


 Mg/ml)  50 ml  1X  ONCE  7/28/20 11:00


 7/28/20 11:01 DC  





 


 Iohexol


  (Omnipaque 350


 Mg/ml)  90 ml  1X  ONCE  3/16/20 03:30


 3/16/20 03:31 DC 3/16/20 03:25


90 ML


 


 Ketorolac


 Tromethamine


  (Toradol 30mg


 Vial)  30 mg  1X  ONCE  3/16/20 03:00


 3/16/20 03:01 DC 3/16/20 02:54


30 MG


 


 Lidocaine HCl


  (Buffered


 Lidocaine 1%)  5 ml  1X  ONCE  8/4/20 15:45


 8/4/20 15:46 DC 8/4/20 15:00


5 ML


 


 Lidocaine HCl


  (Glydo


  (Lidocaine) Jelly)  1 thomas  1X  ONCE  3/20/20 14:30


 3/20/20 14:31 DC 3/20/20 16:38


1 THOMAS


 


 Lidocaine HCl


  (Lidocaine 1%


 20ml Vial)  20 ml  1X  ONCE  6/7/20 15:00


 6/7/20 15:01 DC 6/7/20 15:30


20 ML


 


 Lidocaine HCl


  (Lidocaine Pf 2%


 Vial)  5 ml  STK-MED ONCE  6/30/20 07:44


 6/30/20 07:44 DC  





 


 Lidocaine HCl


  (Xylocaine-Mpf


 1% 2ml Vial)  2 ml  PRN 1X  PRN  4/27/20 07:00


 4/28/20 06:59 DC  





 


 Linezolid/Dextrose  300 ml @ 


 300 mls/hr  Q12HR  5/17/20 09:00


 5/20/20 08:11 DC 5/19/20 21:08


300 MLS/HR


 


 Lorazepam


  (Ativan Inj)  0.25 mg  PRN Q4HRS  PRN  6/3/20 07:30


    8/12/20 09:50


0.25 MG


 


 Magnesium Sulfate  50 ml @ 25


 mls/hr  1X  ONCE  6/30/20 16:30


 6/30/20 18:29 DC 6/30/20 17:02


25 MLS/HR


 


 Meropenem 1 gm/


 Sodium Chloride  100 ml @ 


 200 mls/hr  Q12HR  6/7/20 21:00


 6/25/20 08:56 DC 6/25/20 08:27


200 MLS/HR


 


 Meropenem 500 mg/


 Sodium Chloride  50 ml @ 


 100 mls/hr  Q6HRS  6/28/20 18:00


 7/21/20 08:23 DC 7/21/20 06:15


100 MLS/HR


 


 Methylprednisolone


 Sodium Succinate


  (SOLU-Medrol


 125MG VIAL)  125 mg  1X  ONCE  6/13/20 06:15


 6/13/20 06:16 DC 6/13/20 06:26


125 MG


 


 Metoclopramide HCl


  (Reglan Vial)  10 mg  PRN Q3HRS  PRN  5/9/20 16:45


    5/14/20 04:25


10 MG


 


 Metoprolol


 Tartrate


  (Lopressor Vial)  5 mg  1X  ONCE  8/9/20 10:45


 8/9/20 10:46 DC 8/9/20 10:54


5 MG


 


 Metronidazole  100 ml @ 


 100 mls/hr  Q12HR  8/10/20 10:00


    8/12/20 20:48


100 MLS/HR


 


 Micafungin Sodium


 100 mg/Dextrose  100 ml @ 


 100 mls/hr  Q24H  6/30/20 08:30


 8/5/20 08:20 DC 8/4/20 09:30


100 MLS/HR


 


 Midazolam HCl


  (Versed)  2 mg  1X  ONCE  6/7/20 15:00


 6/7/20 15:01 DC 6/7/20 15:28


1 MG


 


 Midazolam HCl 100


 mg/Sodium Chloride  100 ml @ 1


 mls/hr  CONT  PRN  6/30/20 14:45


 8/3/20 09:45 DC 7/3/20 18:48


10 MLS/HR


 


 Midazolam HCl 50


 mg/Sodium Chloride  50 ml @ 0


 mls/hr  CONT  PRN  3/23/20 08:15


 3/28/20 15:59 DC 3/26/20 22:39


7 MLS/HR


 


 Morphine Sulfate


  (Morphine


 Sulfate)  1 mg  PRN Q1HR  PRN  6/30/20 14:45


 8/3/20 09:45 DC 7/25/20 18:28


1 MG


 


 Multi-Ingred


 Cream/Lotion/Oil/


 Oint


  (Artificial


 Tears Eye


 Ointment)  1 thomas  PRN Q1HR  PRN  3/25/20 17:30


 6/3/20 14:39 DC 4/13/20 08:19


1 THOMAS


 


 Multivitamins/


 Minerals


 Therapeutic


  (Centrum


 Multivit-Mineral


 Liq)  5 ml  DAILY  8/5/20 09:00


    8/12/20 08:28


5 ML


 


 Naloxone HCl


  (Narcan)  0.4 mg  PRN Q2MIN  PRN  6/30/20 14:45


 8/3/20 09:45 DC  





 


 Norepinephrine


 Bitartrate 8 mg/


 Dextrose  258 ml @ 


 13.332 mls/


 hr  CONT  PRN  6/7/20 06:30


 8/3/20 09:45 DC 7/2/20 09:09


1.6 MLS/HR


 


 Ondansetron HCl


  (Zofran)  4 mg  STK-MED ONCE  6/30/20 13:33


 6/30/20 13:33 DC  





 


 Pantoprazole


 Sodium


  (PROTONIX VIAL


 for IV PUSH)  40 mg  DAILYAC  3/16/20 11:30


    8/12/20 08:28


40 MG


 


 Phenylephrine HCl


  (Ken-Synephrine


 Inj)  10 mg  STK-MED ONCE  6/30/20 13:33


 6/30/20 13:33 DC  





 


 Phenylephrine HCl


  (PHENYLEPHRINE


 in 0.9% NACL PF)  1 mg  STK-MED ONCE  6/30/20 14:44


 6/30/20 14:45 DC  





 


 Piperacillin Sod/


 Tazobactam Sod


 3.375 gm/Sodium


 Chloride  50 ml @ 


 100 mls/hr  Q6HRS  5/27/20 12:00


 6/4/20 07:26 DC 6/4/20 06:10


100 MLS/HR


 


 Piperacillin Sod/


 Tazobactam Sod


 4.5 gm/Sodium


 Chloride  100 ml @ 


 200 mls/hr  1X  ONCE  3/16/20 06:00


 3/16/20 06:29 DC 3/16/20 05:44


200 MLS/HR


 


 Potassium


 Chloride 110 meq/


 Magnesium Sulfate


 20 meq/


 Multivitamins 10


 ml/Chromium/


 Copper/Manganese/


 Seleni/Zn 1 ml/


 Insulin Human


 Regular 15 unit/


 Total Parenteral


 Nutrition/Amino


 Acids/Dextrose/


 Fat Emulsion


 Intravenous  1,800 ml @ 


 75 mls/hr  TPN  CONT  5/24/20 22:00


 5/25/20 21:59 DC 5/24/20 22:48


75 MLS/HR


 


 Potassium


 Chloride 15 meq/


 Bicarbonate


 Dialysis Soln w/


 out KCl  5,007.5 ml


  @ 1,000 mls/


 hr  Q5H1M  3/29/20 20:00


 4/2/20 13:08 DC 4/1/20 18:14


1,000 MLS/HR


 


 Potassium


 Chloride 20 meq/


 Bicarbonate


 Dialysis Soln w/


 out KCl  5,010 ml @ 


 1,000 mls/hr  Q5H1M  3/25/20 16:00


 3/29/20 19:59 DC 3/29/20 14:54


1,000 MLS/HR


 


 Potassium


 Chloride 40 meq/


 Potassium Acetate


 60 meq/Magnesium


 Sulfate 10 meq/


 Multivitamins 10


 ml/Chromium/


 Copper/Manganese/


 Seleni/Zn 1 ml/


 Insulin Human


 Regular 20 unit/


 Total Parenteral


 Nutrition/Amino


 Acids/Dextrose/


 Fat Emulsion


 Intravenous  1,800 ml @ 


 75 mls/hr  TPN  CONT  6/4/20 22:00


 6/5/20 21:59 DC 6/5/20 00:03


75 MLS/HR


 


 Potassium


 Chloride 70 meq/


 Magnesium Sulfate


 20 meq/


 Multivitamins 10


 ml/Chromium/


 Copper/Manganese/


 Seleni/Zn 1 ml/


 Insulin Human


 Regular 15 unit/


 Total Parenteral


 Nutrition/Amino


 Acids/Dextrose/


 Fat Emulsion


 Intravenous  1,800 ml @ 


 75 mls/hr  TPN  CONT  5/29/20 22:00


 5/30/20 21:59 DC 5/29/20 23:13


75 MLS/HR


 


 Potassium


 Chloride 75 meq/


 Magnesium Sulfate


 15 meq/


 Multivitamins 10


 ml/Chromium/


 Copper/Manganese/


 Seleni/Zn 0.5 ml/


 Insulin Human


 Regular 15 unit/


 Total Parenteral


 Nutrition/Amino


 Acids/Dextrose/


 Fat Emulsion


 Intravenous  1,920 ml @ 


 80 mls/hr  TPN  CONT  5/9/20 22:00


 5/10/20 21:59 DC 5/9/20 22:41


80 MLS/HR


 


 Potassium


 Chloride 75 meq/


 Magnesium Sulfate


 15 meq/Calcium


 Gluconate 8 meq/


 Multivitamins 10


 ml/Chromium/


 Copper/Manganese/


 Seleni/Zn 0.5 ml/


 Insulin Human


 Regular 15 unit/


 Total Parenteral


 Nutrition/Amino


 Acids/Dextrose/


 Fat Emulsion


 Intravenous  1,920 ml @ 


 80 mls/hr  TPN  CONT  5/7/20 22:00


 5/8/20 21:59 DC 5/7/20 22:28


80 MLS/HR


 


 Potassium


 Chloride 75 meq/


 Magnesium Sulfate


 15 meq/Calcium


 Gluconate 8 meq/


 Multivitamins 10


 ml/Chromium/


 Copper/Manganese/


 Seleni/Zn 0.5 ml/


 Insulin Human


 Regular 20 unit/


 Total Parenteral


 Nutrition/Amino


 Acids/Dextrose/


 Fat Emulsion


 Intravenous  1,920 ml @ 


 80 mls/hr  TPN  CONT  5/6/20 22:00


 5/7/20 21:59 DC 5/6/20 22:00


80 MLS/HR


 


 Potassium


 Chloride 75 meq/


 Magnesium Sulfate


 15 meq/Calcium


 Gluconate 8 meq/


 Multivitamins 10


 ml/Chromium/


 Copper/Manganese/


 Seleni/Zn 0.5 ml/


 Insulin Human


 Regular 25 unit/


 Total Parenteral


 Nutrition/Amino


 Acids/Dextrose/


 Fat Emulsion


 Intravenous  1,920 ml @ 


 80 mls/hr  TPN  CONT  5/4/20 22:00


 5/5/20 21:59 DC 5/4/20 23:08


80 MLS/HR


 


 Potassium


 Chloride 75 meq/


 Magnesium Sulfate


 20 meq/Calcium


 Gluconate 10 meq/


 Multivitamins 10


 ml/Chromium/


 Copper/Manganese/


 Seleni/Zn 0.5 ml/


 Insulin Human


 Regular 25 unit/


 Total Parenteral


 Nutrition/Amino


 Acids/Dextrose/


 Fat Emulsion


 Intravenous  1,920 ml @ 


 80 mls/hr  TPN  CONT  5/3/20 22:00


 5/4/20 21:59 DC 5/3/20 22:04


80 MLS/HR


 


 Potassium


 Chloride 75 meq/


 Magnesium Sulfate


 20 meq/Calcium


 Gluconate 10 meq/


 Multivitamins 10


 ml/Chromium/


 Copper/Manganese/


 Seleni/Zn 0.5 ml/


 Insulin Human


 Regular 30 unit/


 Total Parenteral


 Nutrition/Amino


 Acids/Dextrose/


 Fat Emulsion


 Intravenous  1,920 ml @ 


 80 mls/hr  TPN  CONT  5/2/20 22:00


 5/3/20 22:00 DC 5/2/20 21:51


80 MLS/HR


 


 Potassium


 Chloride 80 meq/


 Magnesium Sulfate


 20 meq/


 Multivitamins 10


 ml/Chromium/


 Copper/Manganese/


 Seleni/Zn 0.5 ml/


 Insulin Human


 Regular 15 unit/


 Total Parenteral


 Nutrition/Amino


 Acids/Dextrose/


 Fat Emulsion


 Intravenous  1,920 ml @ 


 80 mls/hr  TPN  CONT  5/12/20 22:00


 5/13/20 21:59 DC 5/12/20 21:40


80 MLS/HR


 


 Potassium


 Chloride 80 meq/


 Magnesium Sulfate


 20 meq/


 Multivitamins 10


 ml/Chromium/


 Copper/Manganese/


 Seleni/Zn 1 ml/


 Insulin Human


 Regular 15 unit/


 Total Parenteral


 Nutrition/Amino


 Acids/Dextrose/


 Fat Emulsion


 Intravenous  1,800 ml @ 


 75 mls/hr  TPN  CONT  5/31/20 22:00


 6/1/20 21:59 DC 5/31/20 21:54


75 MLS/HR


 


 Potassium


 Chloride 90 meq/


 Magnesium Sulfate


 20 meq/


 Multivitamins 10


 ml/Chromium/


 Copper/Manganese/


 Seleni/Zn 1 ml/


 Insulin Human


 Regular 15 unit/


 Total Parenteral


 Nutrition/Amino


 Acids/Dextrose/


 Fat Emulsion


 Intravenous  1,800 ml @ 


 75 mls/hr  TPN  CONT  5/20/20 22:00


 5/21/20 21:59 DC 5/20/20 22:28


75 MLS/HR


 


 Potassium


 Chloride 90 meq/


 Magnesium Sulfate


 20 meq/


 Multivitamins 10


 ml/Chromium/


 Copper/Manganese/


 Seleni/Zn 1 ml/


 Insulin Human


 Regular 20 unit/


 Total Parenteral


 Nutrition/Amino


 Acids/Dextrose/


 Fat Emulsion


 Intravenous  1,800 ml @ 


 75 mls/hr  TPN  CONT  6/3/20 22:00


 6/4/20 21:59 DC 6/3/20 23:13


75 MLS/HR


 


 Potassium


 Chloride/Water  100 ml @ 


 100 mls/hr  Q1H  6/17/20 08:00


 6/17/20 09:59 DC 6/17/20 09:12


100 MLS/HR


 


 Potassium


 Phosphate 20 mmol/


 Sodium Chloride  106.6667


 ml @ 


 51.667 m...  1X  ONCE  3/25/20 13:00


 3/25/20 15:03 DC 3/25/20 12:51


51.667 MLS/HR


 


 Potassium Acetate


 30 meq/Magnesium


 Sulfate 14 meq/


 Multivitamins 10


 ml/Chromium/


 Copper/Manganese/


 Seleni/Zn 1 ml/


 Insulin Human


 Regular 15 unit/


 Sodium Chloride


 20 meq/Potassium


 Chloride 30 meq/


 Total Parenteral


 Nutrition/Amino


 Acids/Dextrose/


 Fat Emulsion


 Intravenous  1,920 ml @ 


 80 mls/hr  TPN  CONT  6/23/20 22:00


 6/24/20 21:59 DC 6/23/20 21:46


80 MLS/HR


 


 Potassium Acetate


 30 meq/Magnesium


 Sulfate 20 meq/


 Calcium Gluconate


 10 meq/


 Multivitamins 10


 ml/Chromium/


 Copper/Manganese/


 Seleni/Zn 0.5 ml/


 Insulin Human


 Regular 30 unit/


 Potassium


 Chloride 30 meq/


 Total Parenteral


 Nutrition/Amino


 Acids/Dextrose/


 Fat Emulsion


 Intravenous  1,920 ml @ 


 80 mls/hr  TPN  CONT  5/1/20 22:00


 5/2/20 21:59 DC 5/1/20 22:34


80 MLS/HR


 


 Potassium Acetate


 40 meq/Magnesium


 Sulfate 10 meq/


 Multivitamins 10


 ml/Chromium/


 Copper/Manganese/


 Seleni/Zn 1 ml/


 Insulin Human


 Regular 20 unit/


 Total Parenteral


 Nutrition/Amino


 Acids/Dextrose/


 Fat Emulsion


 Intravenous  1,920 ml @ 


 80 mls/hr  TPN  CONT  6/16/20 22:00


 6/17/20 21:59 DC 6/16/20 21:32


80 MLS/HR


 


 Potassium Acetate


 40 meq/Magnesium


 Sulfate 5 meq/


 Multivitamins 10


 ml/Chromium/


 Copper/Manganese/


 Seleni/Zn 1 ml/


 Insulin Human


 Regular 30 unit/


 Total Parenteral


 Nutrition/Amino


 Acids/Dextrose/


 Fat Emulsion


 Intravenous  1,920 ml @ 


 80 mls/hr  TPN  CONT  6/15/20 22:00


 6/16/20 19:34 DC 6/15/20 21:54


80 MLS/HR


 


 Potassium Acetate


 55 meq/Magnesium


 Sulfate 20 meq/


 Calcium Gluconate


 10 meq/


 Multivitamins 10


 ml/Chromium/


 Copper/Manganese/


 Seleni/Zn 0.5 ml/


 Insulin Human


 Regular 30 unit/


 Total Parenteral


 Nutrition/Amino


 Acids/Dextrose/


 Fat Emulsion


 Intravenous  1,920 ml @ 


 80 mls/hr  TPN  CONT  4/30/20 22:00


 5/1/20 21:59 DC 5/1/20 01:00


80 MLS/HR


 


 Potassium Acetate


 55 meq/Magnesium


 Sulfate 20 meq/


 Calcium Gluconate


 10 meq/


 Multivitamins 10


 ml/Chromium/


 Copper/Manganese/


 Seleni/Zn 0.5 ml/


 Insulin Human


 Regular 35 unit/


 Total Parenteral


 Nutrition/Amino


 Acids/Dextrose/


 Fat Emulsion


 Intravenous  1,920 ml @ 


 80 mls/hr  TPN  CONT  4/28/20 22:00


 4/29/20 21:59 DC 4/28/20 22:02


80 MLS/HR


 


 Potassium Acetate


 60 meq/Magnesium


 Sulfate 10 meq/


 Multivitamins 10


 ml/Chromium/


 Copper/Manganese/


 Seleni/Zn 1 ml/


 Insulin Human


 Regular 20 unit/


 Total Parenteral


 Nutrition/Amino


 Acids/Dextrose/


 Fat Emulsion


 Intravenous  1,920 ml @ 


 80 mls/hr  TPN  CONT  6/17/20 22:00


 6/18/20 21:59 DC 6/17/20 21:55


80 MLS/HR


 


 Potassium Acetate


 60 meq/Magnesium


 Sulfate 14 meq/


 Multivitamins 10


 ml/Chromium/


 Copper/Manganese/


 Seleni/Zn 1 ml/


 Insulin Human


 Regular 15 unit/


 Sodium Chloride


 20 meq/Total


 Parenteral


 Nutrition/Amino


 Acids/Dextrose/


 Fat Emulsion


 Intravenous  1,920 ml @ 


 80 mls/hr  TPN  CONT  6/22/20 22:00


 6/23/20 21:59 DC 6/22/20 21:54


80 MLS/HR


 


 Potassium Acetate


 60 meq/Magnesium


 Sulfate 14 meq/


 Multivitamins 10


 ml/Chromium/


 Copper/Manganese/


 Seleni/Zn 1 ml/


 Insulin Human


 Regular 15 unit/


 Total Parenteral


 Nutrition/Amino


 Acids/Dextrose/


 Fat Emulsion


 Intravenous  1,920 ml @ 


 80 mls/hr  TPN  CONT  6/21/20 22:00


 6/22/20 21:59 DC 6/21/20 22:22


80 MLS/HR


 


 Potassium Acetate


 60 meq/Magnesium


 Sulfate 14 meq/


 Multivitamins 10


 ml/Chromium/


 Copper/Manganese/


 Seleni/Zn 1 ml/


 Insulin Human


 Regular 20 unit/


 Total Parenteral


 Nutrition/Amino


 Acids/Dextrose/


 Fat Emulsion


 Intravenous  1,920 ml @ 


 80 mls/hr  TPN  CONT  6/18/20 22:00


 6/19/20 21:59 DC 6/18/20 22:26


80 MLS/HR


 


 Potassium Acetate


 60 meq/Magnesium


 Sulfate 5 meq/


 Multivitamins 10


 ml/Chromium/


 Copper/Manganese/


 Seleni/Zn 1 ml/


 Insulin Human


 Regular 30 unit/


 Total Parenteral


 Nutrition/Amino


 Acids/Dextrose/


 Fat Emulsion


 Intravenous  1,920 ml @ 


 80 mls/hr  TPN  CONT  6/6/20 22:00


 6/7/20 21:59 DC 6/6/20 21:54


80 MLS/HR


 


 Potassium Acetate


 65 meq/Magnesium


 Sulfate 20 meq/


 Calcium Gluconate


 10 meq/


 Multivitamins 10


 ml/Chromium/


 Copper/Manganese/


 Seleni/Zn 0.5 ml/


 Insulin Human


 Regular 30 unit/


 Total Parenteral


 Nutrition/Amino


 Acids/Dextrose/


 Fat Emulsion


 Intravenous  1,920 ml @ 


 80 mls/hr  TPN  CONT  4/29/20 22:00


 4/30/20 21:59 DC 4/29/20 22:22


80 MLS/HR


 


 Potassium Acetate


 80 meq/Magnesium


 Sulfate 5 meq/


 Multivitamins 10


 ml/Chromium/


 Copper/Manganese/


 Seleni/Zn 1 ml/


 Insulin Human


 Regular 20 unit/


 Total Parenteral


 Nutrition/Amino


 Acids/Dextrose/


 Fat Emulsion


 Intravenous  1,920 ml @ 


 80 mls/hr  TPN  CONT  6/5/20 22:00


 6/6/20 21:59 DC 6/5/20 21:59


80 MLS/HR


 


 Prochlorperazine


 Edisylate


  (Compazine)  5 mg  PACU PRN  PRN  4/27/20 07:00


 4/28/20 06:59 DC  





 


 Propofol


  (Diprivan)  200 mg  STK-MED ONCE  6/30/20 07:44


 6/30/20 07:44 DC  





 


 Ringer's Solution  1,000 ml @ 


 175 mls/hr  [id number]  8/11/20 14:30


    8/13/20 06:27


175 MLS/HR


 


 Rocuronium Bromide


  (Zemuron)  100 mg  STK-MED ONCE  6/30/20 07:44


 6/30/20 07:44 DC  





 


 Saliva Substitute


  (Biotene


 Moisturizing


 Mouth)  2 spray  PRN Q15MIN  PRN  5/21/20 11:00


     





 


 Sevoflurane


  (Ultane)  60 ml  STK-MED ONCE  4/27/20 12:26


 4/27/20 12:27 DC  





 


 Sodium


 Bicarbonate 150


 meq/Dextrose  1,150 ml @ 


 75 mls/hr  1X  ONCE  6/30/20 16:30


 7/1/20 07:49 DC 6/30/20 20:02


75 MLS/HR


 


 Sodium


 Bicarbonate 50


 meq/Sodium


 Chloride  1,050 ml @ 


 75 mls/hr  Q14H  3/18/20 07:30


 3/23/20 10:28 DC 3/22/20 21:10


75 MLS/HR


 


 Sodium Acetate 50


 meq/Potassium


 Acetate 55 meq/


 Magnesium Sulfate


 20 meq/Calcium


 Gluconate 10 meq/


 Multivitamins 10


 ml/Chromium/


 Copper/Manganese/


 Seleni/Zn 0.5 ml/


 Insulin Human


 Regular 35 unit/


 Total Parenteral


 Nutrition/Amino


 Acids/Dextrose/


 Fat Emulsion


 Intravenous  1,800 ml @ 


 75 mls/hr  TPN  CONT  4/25/20 22:00


 4/26/20 21:59 DC 4/25/20 22:03


75 MLS/HR


 


 Sodium Bicarbonate


  (Sodium Bicarb


 Adult 8.4% Syr)  50 meq  STK-MED ONCE  8/11/20 14:30


 8/11/20 14:30 DC  





 


 Sodium Chloride  1,000 ml @ 


 1,000 mls/hr  1X  ONCE  8/12/20 17:15


 8/12/20 18:14 DC 8/12/20 17:17


1,000 MLS/HR


 


 Sodium Chloride


  (Normal Saline


 Flush)  3 ml  QSHIFT  PRN  6/30/20 14:45


 8/3/20 09:45 DC  





 


 Sodium Chloride


 80 meq/Potassium


 Chloride 30 meq/


 Potassium Acetate


 30 meq/Magnesium


 Sulfate 14 meq/


 Multivitamins 10


 ml/Chromium/


 Copper/Manganese/


 Seleni/Zn 1 ml/


 Insulin Human


 Regular 15 unit/


 Total Parenteral


 Nutrition/Amino


 Acids/Dextrose/


 Fat Emulsion


 Intravenous  1,920 ml @ 


 80 mls/hr  TPN  CONT  7/1/20 22:00


 7/2/20 21:59 DC 7/1/20 23:05


80 MLS/HR


 


 Sodium Chloride


 90 meq/Calcium


 Gluconate 10 meq/


 Multivitamins 10


 ml/Chromium/


 Copper/Manganese/


 Seleni/Zn 0.5 ml/


 Total Parenteral


 Nutrition/Amino


 Acids/Dextrose/


 Fat Emulsion


 Intravenous  1,512 ml @ 


 63 mls/hr  TPN  CONT  3/18/20 22:00


 3/19/20 21:59 DC 3/18/20 22:06


63 MLS/HR


 


 Sodium Chloride


 90 meq/Calcium


 Gluconate 10 meq/


 Multivitamins 10


 ml/Chromium/


 Copper/Manganese/


 Seleni/Zn 1 ml/


 Total Parenteral


 Nutrition/Amino


 Acids/Dextrose/


 Fat Emulsion


 Intravenous  55.005 ml


  @ 2.292


 mls/hr  TPN  CONT  3/18/20 22:00


 3/18/20 12:33 DC  





 


 Sodium Chloride


 90 meq/Magnesium


 Sulfate 10 meq/


 Calcium Gluconate


 20 meq/


 Multivitamins 10


 ml/Chromium/


 Copper/Manganese/


 Seleni/Zn 0.5 ml/


 Total Parenteral


 Nutrition/Amino


 Acids/Dextrose/


 Fat Emulsion


 Intravenous  1,512 ml @ 


 63 mls/hr  TPN  CONT  3/19/20 22:00


 3/20/20 21:59 DC 3/19/20 22:25


63 MLS/HR


 


 Sodium Chloride


 90 meq/Magnesium


 Sulfate 12 meq/


 Calcium Gluconate


 15 meq/


 Multivitamins 10


 ml/Chromium/


 Copper/Manganese/


 Seleni/Zn 0.5 ml/


 Insulin Human


 Regular 25 unit/


 Total Parenteral


 Nutrition/Amino


 Acids/Dextrose/


 Fat Emulsion


 Intravenous  1,400 ml @ 


 58.333 mls/


 hr  TPN  CONT  4/8/20 22:00


 4/9/20 21:59 DC 4/8/20 21:41


58.333 MLS/HR


 


 Sodium Chloride


 90 meq/Potassium


 Chloride 15 meq/


 Magnesium Sulfate


 12 meq/Calcium


 Gluconate 15 meq/


 Multivitamins 10


 ml/Chromium/


 Copper/Manganese/


 Seleni/Zn 0.5 ml/


 Insulin Human


 Regular 25 unit/


 Total Parenteral


 Nutrition/Amino


 Acids/Dextrose/


 Fat Emulsion


 Intravenous  1,400 ml @ 


 58.333 mls/


 hr  TPN  CONT  4/7/20 22:00


 4/8/20 21:59 DC 4/7/20 22:13


58.333 MLS/HR


 


 Sodium Chloride


 90 meq/Potassium


 Chloride 15 meq/


 Potassium


 Phosphate 10 mmol/


 Magnesium Sulfate


 8 meq/Calcium


 Gluconate 15 meq/


 Multivitamins 10


 ml/Chromium/


 Copper/Manganese/


 Seleni/Zn 0.5 ml/


 Insulin Human


 Regular 25 unit/


 Total Parenteral


 Nutrition/Amino


 Acids/Dextrose/


 Fat Emulsion


 Intravenous  1,400 ml @ 


 58.333 mls/


 hr  TPN  CONT  4/5/20 22:00


 4/6/20 21:59 DC 4/5/20 21:20


58.333 MLS/HR


 


 Sodium Chloride


 90 meq/Potassium


 Chloride 15 meq/


 Potassium


 Phosphate 10 mmol/


 Magnesium Sulfate


 10 meq/Calcium


 Gluconate 20 meq/


 Multivitamins 10


 ml/Chromium/


 Copper/Manganese/


 Seleni/Zn 0.5 ml/


 Total Parenteral


 Nutrition/Amino


 Acids/Dextrose/


 Fat Emulsion


 Intravenous  1,400 ml @ 


 58.333 mls/


 hr  TPN  CONT  3/23/20 22:00


 3/24/20 21:59 DC 3/23/20 21:42


58.333 MLS/HR


 


 Sodium Chloride


 90 meq/Potassium


 Chloride 15 meq/


 Potassium


 Phosphate 10 mmol/


 Magnesium Sulfate


 12 meq/Calcium


 Gluconate 15 meq/


 Multivitamins 10


 ml/Chromium/


 Copper/Manganese/


 Seleni/Zn 0.5 ml/


 Insulin Human


 Regular 25 unit/


 Total Parenteral


 Nutrition/Amino


 Acids/Dextrose/


 Fat Emulsion


 Intravenous  1,400 ml @ 


 58.333 mls/


 hr  TPN  CONT  4/6/20 22:00


 4/7/20 21:59 DC 4/6/20 22:24


58.333 MLS/HR


 


 Sodium Chloride


 90 meq/Potassium


 Chloride 15 meq/


 Potassium


 Phosphate 15 mmol/


 Magnesium Sulfate


 10 meq/Calcium


 Gluconate 15 meq/


 Multivitamins 10


 ml/Chromium/


 Copper/Manganese/


 Seleni/Zn 0.5 ml/


 Total Parenteral


 Nutrition/Amino


 Acids/Dextrose/


 Fat Emulsion


 Intravenous  1,400 ml @ 


 58.333 mls/


 hr  TPN  CONT  3/24/20 22:00


 3/25/20 21:59 DC 3/24/20 22:17


58.333 MLS/HR


 


 Sodium Chloride


 90 meq/Potassium


 Chloride 15 meq/


 Potassium


 Phosphate 15 mmol/


 Magnesium Sulfate


 10 meq/Calcium


 Gluconate 20 meq/


 Multivitamins 10


 ml/Chromium/


 Copper/Manganese/


 Seleni/Zn 0.5 ml/


 Total Parenteral


 Nutrition/Amino


 Acids/Dextrose/


 Fat Emulsion


 Intravenous  1,200 ml @ 


 50 mls/hr  TPN  CONT  3/22/20 22:00


 3/22/20 14:17 DC  





 


 Sodium Chloride


 90 meq/Potassium


 Chloride 15 meq/


 Potassium


 Phosphate 18 mmol/


 Magnesium Sulfate


 8 meq/Calcium


 Gluconate 15 meq/


 Multivitamins 10


 ml/Chromium/


 Copper/Manganese/


 Seleni/Zn 0.5 ml/


 Insulin Human


 Regular 10 unit/


 Total Parenteral


 Nutrition/Amino


 Acids/Dextrose/


 Fat Emulsion


 Intravenous  1,400 ml @ 


 58.333 mls/


 hr  TPN  CONT  3/27/20 22:00


 3/28/20 21:59 DC 3/27/20 21:43


58.333 MLS/HR


 


 Sodium Chloride


 90 meq/Potassium


 Chloride 15 meq/


 Potassium


 Phosphate 18 mmol/


 Magnesium Sulfate


 8 meq/Calcium


 Gluconate 15 meq/


 Multivitamins 10


 ml/Chromium/


 Copper/Manganese/


 Seleni/Zn 0.5 ml/


 Insulin Human


 Regular 15 unit/


 Total Parenteral


 Nutrition/Amino


 Acids/Dextrose/


 Fat Emulsion


 Intravenous  1,400 ml @ 


 58.333 mls/


 hr  TPN  CONT  3/30/20 22:00


 3/31/20 21:59 DC 3/30/20 21:47


58.333 MLS/HR


 


 Sodium Chloride


 90 meq/Potassium


 Chloride 15 meq/


 Potassium


 Phosphate 18 mmol/


 Magnesium Sulfate


 8 meq/Calcium


 Gluconate 15 meq/


 Multivitamins 10


 ml/Chromium/


 Copper/Manganese/


 Seleni/Zn 0.5 ml/


 Insulin Human


 Regular 20 unit/


 Total Parenteral


 Nutrition/Amino


 Acids/Dextrose/


 Fat Emulsion


 Intravenous  1,400 ml @ 


 58.333 mls/


 hr  TPN  CONT  4/2/20 22:00


 4/3/20 21:59 DC 4/2/20 22:45


58.333 MLS/HR


 


 Sodium Chloride


 90 meq/Potassium


 Chloride 15 meq/


 Potassium


 Phosphate 18 mmol/


 Magnesium Sulfate


 8 meq/Calcium


 Gluconate 15 meq/


 Multivitamins 10


 ml/Chromium/


 Copper/Manganese/


 Seleni/Zn 0.5 ml/


 Total Parenteral


 Nutrition/Amino


 Acids/Dextrose/


 Fat Emulsion


 Intravenous  1,400 ml @ 


 58.333 mls/


 hr  TPN  CONT  3/26/20 22:00


 3/27/20 21:59 DC 3/26/20 22:00


58.333 MLS/HR


 


 Sodium Chloride


 90 meq/Potassium


 Chloride 30 meq/


 Potassium Acetate


 30 meq/Magnesium


 Sulfate 15 meq/


 Multivitamins 10


 ml/Chromium/


 Copper/Manganese/


 Seleni/Zn 1 ml/


 Insulin Human


 Regular 15 unit/


 Total Parenteral


 Nutrition/Amino


 Acids/Dextrose/


 Fat Emulsion


 Intravenous  1,680 ml @ 


 70 mls/hr  TPN  CONT  7/16/20 22:00


 7/17/20 21:59 DC 7/16/20 22:06


70 MLS/HR


 


 Sodium Chloride


 90 meq/Potassium


 Phosphate 15 mmol/


 Magnesium Sulfate


 12 meq/Calcium


 Gluconate 15 meq/


 Multivitamins 10


 ml/Chromium/


 Copper/Manganese/


 Seleni/Zn 0.5 ml/


 Insulin Human


 Regular 30 unit/


 Total Parenteral


 Nutrition/Amino


 Acids/Dextrose/


 Fat Emulsion


 Intravenous  1,400 ml @ 


 58.333 mls/


 hr  TPN  CONT  4/10/20 22:00


 4/11/20 21:59 DC 4/10/20 21:49


58.333 MLS/HR


 


 Sodium Chloride


 90 meq/Potassium


 Phosphate 15 mmol/


 Magnesium Sulfate


 12 meq/Calcium


 Gluconate 15 meq/


 Multivitamins 10


 ml/Chromium/


 Copper/Manganese/


 Seleni/Zn 0.5 ml/


 Insulin Human


 Regular 40 unit/


 Total Parenteral


 Nutrition/Amino


 Acids/Dextrose/


 Fat Emulsion


 Intravenous  1,400 ml @ 


 58.333 mls/


 hr  TPN  CONT  4/11/20 22:00


 4/12/20 21:59 DC 4/11/20 21:21


58.333 MLS/HR


 


 Sodium Chloride


 90 meq/Potassium


 Phosphate 19 mmol/


 Magnesium Sulfate


 12 meq/Calcium


 Gluconate 15 meq/


 Multivitamins 10


 ml/Chromium/


 Copper/Manganese/


 Seleni/Zn 0.5 ml/


 Insulin Human


 Regular 40 unit/


 Total Parenteral


 Nutrition/Amino


 Acids/Dextrose/


 Fat Emulsion


 Intravenous  1,400 ml @ 


 58.333 mls/


 hr  TPN  CONT  4/12/20 22:00


 4/13/20 21:59 DC 4/12/20 21:54


58.333 MLS/HR


 


 Sodium Chloride


 90 meq/Potassium


 Phosphate 5 mmol/


 Magnesium Sulfate


 12 meq/Calcium


 Gluconate 15 meq/


 Multivitamins 10


 ml/Chromium/


 Copper/Manganese/


 Seleni/Zn 0.5 ml/


 Insulin Human


 Regular 30 unit/


 Total Parenteral


 Nutrition/Amino


 Acids/Dextrose/


 Fat Emulsion


 Intravenous  1,400 ml @ 


 58.333 mls/


 hr  TPN  CONT  4/9/20 22:00


 4/10/20 21:59 DC 4/9/20 22:08


58.333 MLS/HR


 


 Sodium Chloride


 100 meq/Potassium


 Chloride 30 meq/


 Potassium Acetate


 30 meq/Magnesium


 Sulfate 12 meq/


 Multivitamins 10


 ml/Chromium/


 Copper/Manganese/


 Seleni/Zn 1 ml/


 Insulin Human


 Regular 15 unit/


 Total Parenteral


 Nutrition/Amino


 Acids/Dextrose/


 Fat Emulsion


 Intravenous  1,680 ml @ 


 70 mls/hr  TPN  CONT  7/5/20 22:00


 7/6/20 21:59 DC 7/5/20 21:23


70 MLS/HR


 


 Sodium Chloride


 100 meq/Potassium


 Chloride 40 meq/


 Magnesium Sulfate


 15 meq/Calcium


 Gluconate 15 meq/


 Multivitamins 10


 ml/Chromium/


 Copper/Manganese/


 Seleni/Zn 0.5 ml/


 Insulin Human


 Regular 35 unit/


 Total Parenteral


 Nutrition/Amino


 Acids/Dextrose/


 Fat Emulsion


 Intravenous  1,400 ml @ 


 58.333 mls/


 hr  TPN  CONT  4/19/20 22:00


 4/20/20 21:59 DC 4/19/20 22:46


58.333 MLS/HR


 


 Sodium Chloride


 100 meq/Potassium


 Chloride 40 meq/


 Magnesium Sulfate


 20 meq/Calcium


 Gluconate 10 meq/


 Multivitamins 10


 ml/Chromium/


 Copper/Manganese/


 Seleni/Zn 0.5 ml/


 Insulin Human


 Regular 35 unit/


 Total Parenteral


 Nutrition/Amino


 Acids/Dextrose/


 Fat Emulsion


 Intravenous  1,400 ml @ 


 58.333 mls/


 hr  TPN  CONT  4/23/20 22:00


 4/24/20 21:59 DC 4/24/20 00:06


58.333 MLS/HR


 


 Sodium Chloride


 100 meq/Potassium


 Chloride 40 meq/


 Magnesium Sulfate


 20 meq/Calcium


 Gluconate 15 meq/


 Multivitamins 10


 ml/Chromium/


 Copper/Manganese/


 Seleni/Zn 0.5 ml/


 Insulin Human


 Regular 35 unit/


 Total Parenteral


 Nutrition/Amino


 Acids/Dextrose/


 Fat Emulsion


 Intravenous  1,400 ml @ 


 58.333 mls/


 hr  TPN  CONT  4/22/20 22:00


 4/23/20 21:59 DC 4/22/20 22:27


58.333 MLS/HR


 


 Sodium Chloride


 100 meq/Potassium


 Phosphate 10 mmol/


 Magnesium Sulfate


 12 meq/Calcium


 Gluconate 15 meq/


 Multivitamins 10


 ml/Chromium/


 Copper/Manganese/


 Seleni/Zn 0.5 ml/


 Insulin Human


 Regular 35 unit/


 Potassium


 Chloride 20 meq/


 Total Parenteral


 Nutrition/Amino


 Acids/Dextrose/


 Fat Emulsion


 Intravenous  1,400 ml @ 


 58.333 mls/


 hr  TPN  CONT  4/16/20 22:00


 4/17/20 21:59 DC 4/16/20 22:10


58.333 MLS/HR


 


 Sodium Chloride


 100 meq/Potassium


 Phosphate 19 mmol/


 Magnesium Sulfate


 12 meq/Calcium


 Gluconate 15 meq/


 Multivitamins 10


 ml/Chromium/


 Copper/Manganese/


 Seleni/Zn 0.5 ml/


 Insulin Human


 Regular 40 unit/


 Potassium


 Chloride 20 meq/


 Total Parenteral


 Nutrition/Amino


 Acids/Dextrose/


 Fat Emulsion


 Intravenous  1,400 ml @ 


 58.333 mls/


 hr  TPN  CONT  4/15/20 22:00


 4/16/20 21:59 DC 4/15/20 21:20


58.333 MLS/HR


 


 Sodium Chloride


 100 meq/Potassium


 Phosphate 5 mmol/


 Magnesium Sulfate


 12 meq/Calcium


 Gluconate 15 meq/


 Multivitamins 10


 ml/Chromium/


 Copper/Manganese/


 Seleni/Zn 0.5 ml/


 Insulin Human


 Regular 35 unit/


 Potassium


 Chloride 20 meq/


 Total Parenteral


 Nutrition/Amino


 Acids/Dextrose/


 Fat Emulsion


 Intravenous  1,400 ml @ 


 58.333 mls/


 hr  TPN  CONT  4/17/20 22:00


 4/18/20 21:59 DC 4/17/20 22:59


58.333 MLS/HR


 


 Sodium Chloride


 110 meq/Potassium


 Chloride 30 meq/


 Potassium Acetate


 30 meq/Magnesium


 Sulfate 15 meq/


 Multivitamins 10


 ml/Chromium/


 Copper/Manganese/


 Seleni/Zn 1 ml/


 Insulin Human


 Regular 15 unit/


 Total Parenteral


 Nutrition/Amino


 Acids/Dextrose/


 Fat Emulsion


 Intravenous  1,680 ml @ 


 70 mls/hr  TPN  CONT  7/18/20 22:00


 7/19/20 21:59 DC 7/18/20 22:01


70 MLS/HR


 


 Sodium Chloride


 110 meq/Sodium


 Phosphate 10 mmol/


 Potassium


 Chloride 30 meq/


 Potassium Acetate


 30 meq/Magnesium


 Sulfate 15 meq/


 Multivitamins 10


 ml/Chromium/


 Copper/Manganese/


 Seleni/Zn 1 ml/


 Insulin Human


 Regular 15 unit/


 Total Parenteral


 Nutrition/Amino


 Acids/Dextrose/


 Fat Emulsion


 Intravenous  1,680 ml @ 


 70 mls/hr  TPN  CONT  7/19/20 22:00


 7/20/20 21:59 DC 7/19/20 22:03


70 MLS/HR


 


 Sodium Chloride


 120 meq/Sodium


 Phosphate 10 mmol/


 Potassium


 Chloride 30 meq/


 Potassium Acetate


 30 meq/Magnesium


 Sulfate 15 meq/


 Multivitamins 10


 ml/Chromium/


 Copper/Manganese/


 Seleni/Zn 1 ml/


 Insulin Human


 Regular 15 unit/


 Total Parenteral


 Nutrition/Amino


 Acids/Dextrose/


 Fat Emulsion


 Intravenous  1,680 ml @ 


 70 mls/hr  TPN  CONT  7/26/20 22:00


 7/27/20 21:59 Cancel  





 


 Succinylcholine


 Chloride


  (Anectine)  120 mg  1X  ONCE  3/23/20 08:30


 3/23/20 08:31 DC 3/23/20 08:34


120 MG


 


 Vancomycin HCl


  (Vanco Per


 Pharmacy)  1 each  PRN DAILY  PRN  7/12/20 09:15


 7/15/20 07:41 DC 7/14/20 02:46


1 EACH


 


 Vancomycin HCl


  (Vancomycin


 Random Level)  1 each  1X  ONCE  7/14/20 01:00


 7/14/20 01:01 DC 7/14/20 01:00


1 EACH


 


 Vancomycin HCl


  (Vancomycin


 Trough Level)  1 each  1X  ONCE  7/15/20 09:30


 7/15/20 09:31 Cancel  





 


 Vancomycin HCl


 1.5 gm/Sodium


 Chloride  500 ml @ 


 250 mls/hr  Q12H  7/14/20 10:00


 7/15/20 07:41 DC 7/14/20 22:07


250 MLS/HR


 


 Vancomycin HCl 2


 gm/Sodium Chloride  500 ml @ 


 250 mls/hr  1X  ONCE  7/12/20 10:00


 7/12/20 11:59 DC 7/12/20 10:34


250 MLS/HR


 


 Vasopressin


  (Vasostrict)  20 unit  STK-MED ONCE  6/30/20 12:23


 6/30/20 12:23 DC  





 


 Vasopressin 20


 unit/Dextrose  101 ml @ 


 12 mls/hr  CONT  PRN  6/30/20 15:30


 8/3/20 09:45 DC 7/7/20 04:17


12 MLS/HR


 


 Vecuronium Bromide


  (Norcuron Bolus)  6 mg  PRN Q6HRS  PRN  5/7/20 19:15


 5/7/20 19:35 DC  





 


 Vitamin A/Vitamin


 D


  (Vitamin A & D


 Ointment)  1 thomas  PRN Q1HR  PRN  8/4/20 09:15


    8/11/20 10:44


1 THOMAS


 


 Zoledronic Acid  100 ml @ 


 400 mls/hr  1X  ONCE  8/7/20 12:00


 8/7/20 12:14 DC 8/7/20 13:04


400 MLS/HR











Labs:


Lab





Laboratory Tests








Test


 8/12/20


11:59 8/12/20


17:40 8/13/20


00:26 8/13/20


04:30


 


Glucose (Fingerstick)


 126 mg/dL


(70-99) 156 mg/dL


(70-99) 157 mg/dL


(70-99) 





 


White Blood Count


 


 


 


 12.2 x10^3/uL


(4.0-11.0)


 


Red Blood Count


 


 


 


 2.58 x10^6/uL


(3.50-5.40)


 


Hemoglobin


 


 


 


 7.0 g/dL


(12.0-15.5)


 


Hematocrit


 


 


 


 23.4 %


(36.0-47.0)


 


Mean Corpuscular Volume    91 fL () 


 


Mean Corpuscular Hemoglobin    27 pg (25-35) 


 


Mean Corpuscular Hemoglobin


Concent 


 


 


 30 g/dL


(31-37)


 


Red Cell Distribution Width


 


 


 


 20.5 %


(11.5-14.5)


 


Platelet Count


 


 


 


 394 x10^3/uL


(140-400)


 


Sodium Level


 


 


 


 151 mmol/L


(136-145)


 


Potassium Level


 


 


 


 4.1 mmol/L


(3.5-5.1)


 


Chloride Level


 


 


 


 119 mmol/L


()


 


Carbon Dioxide Level


 


 


 


 21 mmol/L


(21-32)


 


Anion Gap    11 (6-14) 


 


Blood Urea Nitrogen


 


 


 


 75 mg/dL


(7-20)


 


Creatinine


 


 


 


 2.0 mg/dL


(0.6-1.0)


 


Estimated GFR


(Cockcroft-Gault) 


 


 


 26.5 





 


Glucose Level


 


 


 


 164 mg/dL


(70-99)


 


Calcium Level


 


 


 


 7.0 mg/dL


(8.5-10.1)


 


Test


 8/13/20


06:58 


 


 





 


Glucose (Fingerstick)


 192 mg/dL


(70-99) 


 


 











Micro


        NEG ANSON 56


        PSEUDOMONAS AERUGINOSA


        ANTIBIOTIC                        RESULT          INTERPRETATION


        AMIKACIN                          <=16                  S


        AZTREONAM                         >16                   R


        CEFTAZIDIME                       >16                   R


        CIPROFLOXACIN                     <=0.25                S


        CEFEPIME                          16                    I


        GENTAMICIN                        <=2                   S


        LEVOFLOXACIN                      <=0.5                 S














                               ** CONTINUED ON NEXT PAGE **





-------------------------------

-------------------------------------------------------------





RUN DATE: 06/10/20                  [facility] *LIVE*               

  PAGE 2   


RUN TIME: 1121                            Specimen Inquiry                    


---------------------

-----------------------------------------------------------------------





SPEC: [id number]    PATIENT: JESENIA BEAN                [id number]  

(Continued)


 

--------------------------------------------------------------------------------


------------








 

--------------------------------------------------------------------------------


------------





  Procedure                         Result                                      

         


----------------------------------------------------------------------

----------------------





  ANTIMICROBIAL SUSCEPTIBILITY  Preliminary   (continued)


        MEROPENEM                         <=1                   S


        PIPERACILLIN/TAZOBACTAM           64                    S


        TOBRAMYCIN                        <=2                   S


        Unless otherwise specified, Testing Performed by:


        [facility]


        [address] [place] [address]


        For Inquires, the Physician may contact the Microbiology


        department at [phone]





 

--------------------------------------------------------------------------------


------------





Objective:


Assessment:


Patient with prolonged hospitalization more than 4 months


Multiple medical problems


Multiple surgical procedures





  


Intermittent fevers now improving


S/P Exp. 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 

parapsilosis & PSAE (I-merrem/ceftazidime/AZT/cefepime))


Leukocytosis - better


Loose stool on tube feed - WBC down and no gross fever


Anemia


Acute gallstone pancreatitis with persistent necrosis


  - 4/9.  CT A/P Increased ascites. Persistent evidence of necrotizing 

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


  - 4/27. status post ROBERT drain placement; C. parapsilosis. s/p drain 5/6 + yeast

& high amylase; s/p additional drain on 5/8. Drains removed. 


  -5/6. fluid  candida parapsilosis fluid, amylase high


  - 6/6 showed multiple pseudocysts, slight larger on the right. s/p drains x 3,

6/7.  + PSAE (MDRO-R Cefepime, Zosyn ANSON < 64) and yeast, 


  -6/7 s/p drain replacement x 3; fluid cult PSAE (MDRO), yeast; treated


  -7/12 CT A/P shows smaller fluid collections.  


  -722 CT abdomen and pelvis drains in place       


Ascites s/p paracentesis 4/15 & 5/6. C. parapsilosis 


Cholelithiasis with thickening of the gallbladder wall.


JED, Hyperkalemia, Metabolic acidosis off dialysis


Acute hypoxic resp failure. trach/vent. sputum 6/13  + PSAE (I merrem) ; sputum 

culture July 19+ for PSAE R Merrem, sensitive to cefepime


Pleural effusion status post CTS left side


 Abdominal fluid culture 6 /7 MDRO Pseudomonas, yeast


Sputum culture positive 7/19 for MDRO Pseudomonas


Chest tube fluid positive for 7/21 Candida Parapsilosis


EC fistula


Severe PCM


Critical illness myopathy


Gen debility


Last urine culture Candida parapsilosis





Plan:


Plan of Care


Fever pattern improved


Leukocytosis improving


Continue cefepime, not dosing as needed and flagyl 8/10,


Follow-up C diff pcr


BC negative so far


Follow-up cultures and monitor labs


[place] changed 8/11


Nystatin to groin


Right upper extremity PICC line placed 7/31


Wound care /drain management as directed


Contact isolation for CRE/MDRO








Long term prognosis  poor





D/w nursing











IVAN FRANZ MD           Aug 13, 2020 08:10

## 2020-08-14 VITALS — SYSTOLIC BLOOD PRESSURE: 110 MMHG | DIASTOLIC BLOOD PRESSURE: 66 MMHG

## 2020-08-14 VITALS — DIASTOLIC BLOOD PRESSURE: 67 MMHG | SYSTOLIC BLOOD PRESSURE: 124 MMHG

## 2020-08-14 VITALS — DIASTOLIC BLOOD PRESSURE: 56 MMHG | SYSTOLIC BLOOD PRESSURE: 96 MMHG

## 2020-08-14 VITALS — SYSTOLIC BLOOD PRESSURE: 135 MMHG | DIASTOLIC BLOOD PRESSURE: 62 MMHG

## 2020-08-14 VITALS — DIASTOLIC BLOOD PRESSURE: 55 MMHG | SYSTOLIC BLOOD PRESSURE: 102 MMHG

## 2020-08-14 VITALS — SYSTOLIC BLOOD PRESSURE: 119 MMHG | DIASTOLIC BLOOD PRESSURE: 68 MMHG

## 2020-08-14 LAB
ALBUMIN SERPL-MCNC: 0.9 G/DL (ref 3.4–5)
ALBUMIN/GLOB SERPL: 0.3 {RATIO} (ref 1–1.7)
ALP SERPL-CCNC: 72 U/L (ref 46–116)
ALT SERPL-CCNC: < 6 U/L (ref 14–59)
ANION GAP SERPL CALC-SCNC: 12 MMOL/L (ref 6–14)
AST SERPL-CCNC: 13 U/L (ref 15–37)
BASOPHILS # BLD AUTO: 0 X10^3/UL (ref 0–0.2)
BASOPHILS NFR BLD: 0 % (ref 0–3)
BILIRUB SERPL-MCNC: 0.2 MG/DL (ref 0.2–1)
BUN SERPL-MCNC: 58 MG/DL (ref 7–20)
BUN/CREAT SERPL: 34 (ref 6–20)
CALCIUM SERPL-MCNC: 5.7 MG/DL (ref 8.5–10.1)
CHLORIDE SERPL-SCNC: 124 MMOL/L (ref 98–107)
CO2 SERPL-SCNC: 17 MMOL/L (ref 21–32)
CREAT SERPL-MCNC: 1.7 MG/DL (ref 0.6–1)
EOSINOPHIL NFR BLD: 0.1 X10^3/UL (ref 0–0.7)
EOSINOPHIL NFR BLD: 1 % (ref 0–3)
ERYTHROCYTE [DISTWIDTH] IN BLOOD BY AUTOMATED COUNT: 20.7 % (ref 11.5–14.5)
GFR SERPLBLD BASED ON 1.73 SQ M-ARVRAT: 31.9 ML/MIN
GLOBULIN SER-MCNC: 3.1 G/DL (ref 2.2–3.8)
GLUCOSE SERPL-MCNC: 171 MG/DL (ref 70–99)
HCT VFR BLD CALC: 25.7 % (ref 36–47)
HGB BLD-MCNC: 7.9 G/DL (ref 12–15.5)
LYMPHOCYTES # BLD: 1.4 X10^3/UL (ref 1–4.8)
LYMPHOCYTES NFR BLD AUTO: 14 % (ref 24–48)
MCH RBC QN AUTO: 28 PG (ref 25–35)
MCHC RBC AUTO-ENTMCNC: 31 G/DL (ref 31–37)
MCV RBC AUTO: 91 FL (ref 79–100)
MONO #: 0.8 X10^3/UL (ref 0–1.1)
MONOCYTES NFR BLD: 8 % (ref 0–9)
NEUT #: 7.7 X10^3/UL (ref 1.8–7.7)
NEUTROPHILS NFR BLD AUTO: 77 % (ref 31–73)
PLATELET # BLD AUTO: 320 X10^3/UL (ref 140–400)
POTASSIUM SERPL-SCNC: 2.9 MMOL/L (ref 3.5–5.1)
PROT SERPL-MCNC: 4 G/DL (ref 6.4–8.2)
RBC # BLD AUTO: 2.83 X10^6/UL (ref 3.5–5.4)
SODIUM SERPL-SCNC: 153 MMOL/L (ref 136–145)
WBC # BLD AUTO: 10 X10^3/UL (ref 4–11)

## 2020-08-14 PROCEDURE — 30233N1 TRANSFUSION OF NONAUTOLOGOUS RED BLOOD CELLS INTO PERIPHERAL VEIN, PERCUTANEOUS APPROACH: ICD-10-PCS | Performed by: INTERNAL MEDICINE

## 2020-08-14 PROCEDURE — 30233K1 TRANSFUSION OF NONAUTOLOGOUS FROZEN PLASMA INTO PERIPHERAL VEIN, PERCUTANEOUS APPROACH: ICD-10-PCS | Performed by: INTERNAL MEDICINE

## 2020-08-14 RX ADMIN — IPRATROPIUM BROMIDE AND ALBUTEROL SULFATE SCH ML: .5; 3 SOLUTION RESPIRATORY (INHALATION) at 12:10

## 2020-08-14 RX ADMIN — ACETYLCYSTEINE SCH MG: 200 INHALANT RESPIRATORY (INHALATION) at 08:00

## 2020-08-14 RX ADMIN — CEFEPIME SCH GM: 2 INJECTION, POWDER, FOR SOLUTION INTRAVENOUS at 00:30

## 2020-08-14 RX ADMIN — IPRATROPIUM BROMIDE AND ALBUTEROL SULFATE SCH ML: .5; 3 SOLUTION RESPIRATORY (INHALATION) at 08:07

## 2020-08-14 RX ADMIN — INSULIN LISPRO SCH UNITS: 100 INJECTION, SOLUTION INTRAVENOUS; SUBCUTANEOUS at 17:37

## 2020-08-14 RX ADMIN — SODIUM CHLORIDE, SODIUM LACTATE, POTASSIUM CHLORIDE, AND CALCIUM CHLORIDE SCH MLS/HR: .6; .31; .03; .02 INJECTION, SOLUTION INTRAVENOUS at 05:58

## 2020-08-14 RX ADMIN — ACETYLCYSTEINE SCH MG: 200 INHALANT RESPIRATORY (INHALATION) at 20:29

## 2020-08-14 RX ADMIN — FENTANYL CITRATE PRN MCG: 50 INJECTION INTRAMUSCULAR; INTRAVENOUS at 13:33

## 2020-08-14 RX ADMIN — IPRATROPIUM BROMIDE AND ALBUTEROL SULFATE SCH ML: .5; 3 SOLUTION RESPIRATORY (INHALATION) at 20:29

## 2020-08-14 RX ADMIN — CYCLOBENZAPRINE HYDROCHLORIDE PRN MG: 10 TABLET, FILM COATED ORAL at 00:29

## 2020-08-14 RX ADMIN — IPRATROPIUM BROMIDE AND ALBUTEROL SULFATE SCH ML: .5; 3 SOLUTION RESPIRATORY (INHALATION) at 03:40

## 2020-08-14 RX ADMIN — SODIUM CHLORIDE, SODIUM LACTATE, POTASSIUM CHLORIDE, AND CALCIUM CHLORIDE SCH MLS/HR: .6; .31; .03; .02 INJECTION, SOLUTION INTRAVENOUS at 23:33

## 2020-08-14 RX ADMIN — POTASSIUM CHLORIDE SCH MLS/HR: 200 INJECTION, SOLUTION INTRAVENOUS at 15:28

## 2020-08-14 RX ADMIN — INSULIN LISPRO SCH UNITS: 100 INJECTION, SOLUTION INTRAVENOUS; SUBCUTANEOUS at 06:00

## 2020-08-14 RX ADMIN — ENOXAPARIN SODIUM SCH MG: 40 INJECTION SUBCUTANEOUS at 15:25

## 2020-08-14 RX ADMIN — SODIUM CHLORIDE, SODIUM LACTATE, POTASSIUM CHLORIDE, AND CALCIUM CHLORIDE SCH MLS/HR: .6; .31; .03; .02 INJECTION, SOLUTION INTRAVENOUS at 14:54

## 2020-08-14 RX ADMIN — INSULIN LISPRO SCH UNITS: 100 INJECTION, SOLUTION INTRAVENOUS; SUBCUTANEOUS at 00:00

## 2020-08-14 RX ADMIN — SODIUM CHLORIDE, SODIUM LACTATE, POTASSIUM CHLORIDE, AND CALCIUM CHLORIDE SCH MLS/HR: .6; .31; .03; .02 INJECTION, SOLUTION INTRAVENOUS at 03:28

## 2020-08-14 RX ADMIN — CEFEPIME SCH GM: 2 INJECTION, POWDER, FOR SOLUTION INTRAVENOUS at 20:45

## 2020-08-14 RX ADMIN — PANTOPRAZOLE SODIUM SCH MG: 40 INJECTION, POWDER, FOR SOLUTION INTRAVENOUS at 08:40

## 2020-08-14 RX ADMIN — SODIUM CHLORIDE, SODIUM LACTATE, POTASSIUM CHLORIDE, AND CALCIUM CHLORIDE SCH MLS/HR: .6; .31; .03; .02 INJECTION, SOLUTION INTRAVENOUS at 15:24

## 2020-08-14 RX ADMIN — INSULIN LISPRO SCH UNITS: 100 INJECTION, SOLUTION INTRAVENOUS; SUBCUTANEOUS at 12:43

## 2020-08-14 RX ADMIN — IPRATROPIUM BROMIDE AND ALBUTEROL SULFATE SCH ML: .5; 3 SOLUTION RESPIRATORY (INHALATION) at 16:20

## 2020-08-14 RX ADMIN — MULTIVITAMIN SCH ML: LIQUID ORAL at 08:40

## 2020-08-14 RX ADMIN — IPRATROPIUM BROMIDE AND ALBUTEROL SULFATE SCH ML: .5; 3 SOLUTION RESPIRATORY (INHALATION) at 00:22

## 2020-08-14 RX ADMIN — POTASSIUM CHLORIDE SCH MLS/HR: 200 INJECTION, SOLUTION INTRAVENOUS at 17:09

## 2020-08-14 RX ADMIN — CEFEPIME SCH GM: 2 INJECTION, POWDER, FOR SOLUTION INTRAVENOUS at 08:40

## 2020-08-14 NOTE — NUR
SS following up with discharge planning. SS reviewed pt chart and discussed with pt RN. Pt 
currently requiring oxygen. Pt has trach and tube feeds. Pt has pepe. Max assist with 
PT/OT. Pt on IV Cefepime and IV Flagyl. Pt continues to have drainage out of drain sites. 
Med Assist continuing to attempt to work with family for needed financial documents. APS 
investigating social situation. SS will continue to follow for discharge planning.

## 2020-08-14 NOTE — PDOC
Infectious Disease Note


Subjective:


Subjective


Pt lethargic, arousable


Afebrile last 96hrs





Vital Signs:


Vital Signs





Vital Signs








  Date Time  Temp Pulse Resp B/P (MAP) Pulse Ox O2 Delivery O2 Flow Rate FiO2


 


8/14/20 07:00 97.5 117 28 102/55 (71) 99 Nasal Cannula 2.0 





 97.5       











Physical Exam:


PHYSICAL EXAM


GENERAL: Resting in bed, NAD


HEENT: Pupils equal, oral cavity dry. OC/Op - Dry


NECK:  Tracheostomy capped


LUNGS: Diminished aeration bases,


HEART:  S1, S2, 


ABDOMEN: Less distended, mild- bowel sounds hypoactive, soft, GJ drain present, 

drainage bag in place with depedent drainage


: [place] in place 


EXTREMITIES: Trace generalized edema, no cyanosis. Yeast in groin area


SKIN: warm touch. No signs of rash.  


NEURO: - Alert and responsive


RUE  PICC site without signs of complications. PIV s clean


EC fistula





Medications:


Inpatient Meds:





Current Medications








 Medications


  (Trade)  Dose


 Ordered  Sig/Yee  Start Time


 Stop Time Status Last Admin


Dose Admin


 


 Acetaminophen


  (Tylenol Supp)  650 mg  PRN Q6HRS  PRN  3/24/20 10:30


    8/10/20 15:43


650 MG


 


 Acetaminophen


  (Tylenol)  650 mg  PRN Q6HRS  PRN  3/21/20 03:36


 5/13/20 10:25 DC 4/16/20 19:56


650 MG


 


 Acetaminophen/


 Hydrocodone Bitart


  (Lortab 5/325)  1 tab  PRN Q4HRS  PRN  7/23/20 16:00


    8/13/20 12:25


1 TAB


 


 Acetylcysteine


  (Mucomyst 20%


 Resp Treatment)  600 mg  RTBID  6/27/20 12:00


    8/13/20 19:55


600 MG


 


 Albumin Human  500 ml @ 


 125 mls/hr  PRN Q1HR  PRN  6/30/20 15:45


 8/3/20 09:52 DC  





 


 Albuterol Sulfate


  (Ventolin Neb


 Soln)  2.5 mg  1X  ONCE  3/17/20 22:30


 3/17/20 22:31 DC 3/18/20 00:56


2.5 MG


 


 Albuterol/


 Ipratropium


  (Duoneb)  3 ml  Q4HRS  6/13/20 08:00


    8/14/20 03:40


3 ML


 


 Alprazolam


  (Xanax)  0.5 mg  PRN QID  PRN  7/23/20 16:00


    8/14/20 00:29


0.5 MG


 


 Alteplase,


 Recombinant


  (Cathflo For


 Central Catheter


 Clearance)  1 mg  1X  ONCE  8/5/20 07:00


 8/5/20 07:01 DC 8/5/20 09:30


1 MG


 


 Alteplase,


 Recombinant 4 mg/


 Sodium Chloride  20 ml @ 20


 mls/hr  1X  ONCE  6/17/20 10:00


 6/17/20 10:59 DC 6/17/20 10:09


20 MLS/HR


 


 Alteplase,


 Recombinant 5 mg/


 Sodium Chloride  30 ml @ 30


 mls/hr  1X  PRN  8/5/20 06:45


   UNV  





 


 Amino Acids/


 Glycerin/


 Electrolytes  1,000 ml @ 


 80 mls/hr  [id number]  7/26/20 10:15


 8/2/20 07:07 DC 8/1/20 18:10


80 MLS/HR


 


 Artificial Tears


  (Artificial


 Tears)  1 drop  PRN Q15MIN  PRN  4/29/20 05:30


    6/23/20 21:17


1 DROP


 


 Atenolol


  (Tenormin)  100 mg  DAILY  3/17/20 09:00


 3/16/20 20:08 DC  





 


 Atropine Sulfate


  (ATROPINE 0.5mg


 SYRINGE)  0.5 mg  PRN Q5MIN  PRN  4/2/20 08:15


 8/3/20 09:45 DC  





 


 Barium Sulfate


  (Varibar Thin


 Liquid Apple)  148 gm  1X  ONCE  5/26/20 11:45


 5/26/20 11:49 DC  





 


 Benzocaine


  (Hurricaine One)  1 spray  1X  ONCE  3/20/20 14:30


 3/20/20 14:31 DC 3/20/20 16:38


1 SPRAY


 


 Bisacodyl


  (Dulcolax Supp)  10 mg  STK-MED ONCE  4/27/20 10:59


 4/27/20 10:59 DC  





 


 Bumetanide


  (Bumex)  2 mg  DAILY  5/8/20 10:00


 5/18/20 17:15 DC 5/18/20 08:07


2 MG


 


 Bupivacaine HCl/


 Epinephrine Bitart


  (Sensorcain-Epi


 0.5%-1:115157 Mpf)  30 ml  STK-MED ONCE  6/30/20 08:34


 6/30/20 08:35 DC  





 


 Calcitonin Rosalia


  (Miacalcin)  400 unit  BID  8/6/20 18:00


 8/7/20 15:56 DC 8/6/20 18:18


400 UNIT


 


 Calcium Carbonate/


 Glycine


  (Tums)  500 mg  PRN [id number]  PRN  3/18/20 17:45


 5/13/20 10:25 DC  





 


 Calcium Chloride


 1000 mg/Sodium


 Chloride  110 ml @ 


 220 mls/hr  1X  ONCE  3/17/20 22:30


 3/17/20 22:59 DC 3/17/20 22:11


220 MLS/HR


 


 Calcium Chloride


 3000 mg/Sodium


 Chloride  1,030 ml @ 


 50 mls/hr  [id number]  3/19/20 08:00


 3/21/20 15:23 DC 3/21/20 02:17


50 MLS/HR


 


 Calcium Gluconate


  (Calcium


 Gluconate)  2,000 mg  1X  ONCE  3/19/20 02:15


 3/19/20 02:16 DC 3/19/20 02:19


2,000 MG


 


 Calcium Gluconate


 1000 mg/Sodium


 Chloride  110 ml @ 


 220 mls/hr  1X  ONCE  3/18/20 03:30


 3/18/20 03:59 DC 3/18/20 03:21


220 MLS/HR


 


 Calcium Gluconate


 2000 mg/Sodium


 Chloride  120 ml @ 


 220 mls/hr  1X  ONCE  3/18/20 07:30


 3/18/20 08:02 DC 3/18/20 09:05


220 MLS/HR


 


 Cefepime HCl


  (Maxipime)  2 gm  Q12HR  8/10/20 10:00


    8/14/20 00:30


2 GM


 


 Ceftazidime/


 Avibactam 2.5 gm/


 Sodium Chloride  250 ml @ 


 125 mls/hr  Q8HRS  7/23/20 08:00


 8/5/20 08:20 DC 8/5/20 05:38


125 MLS/HR


 


 Cellulose


  (Surgicel


 Fibrillar 1x2)  1 each  STK-MED ONCE  4/6/20 11:00


 4/6/20 11:01 DC  





 


 Cellulose


  (Surgicel


 Hemostat 2x14)  1 each  STK-MED ONCE  4/27/20 10:58


 4/27/20 10:59 DC  





 


 Cellulose


  (Surgicel


 Hemostat 4x8)  1 each  STK-MED ONCE  4/27/20 10:58


 4/27/20 10:59 DC  





 


 Chlorhexidine


 Gluconate


  (Peridex)  15 ml  BID  6/13/20 09:00


 6/13/20 07:58 DC  





 


 Ciprofloxacin/


 Dextrose  200 ml @ 


 200 mls/hr  Q12HR  7/12/20 10:00


 7/21/20 08:20 DC 7/20/20 21:02


200 MLS/HR


 


 Cyclobenzaprine


 HCl


  (Flexeril)  10 mg  PRN Q6HRS  PRN  4/30/20 10:45


    8/14/20 00:29


10 MG


 


 Daptomycin 410 mg/


 Sodium Chloride  50 ml @ 


 100 mls/hr  Q24H  6/7/20 14:00


 6/10/20 08:30 DC 6/9/20 13:33


100 MLS/HR


 


 Daptomycin 430 mg/


 Sodium Chloride  50 ml @ 


 100 mls/hr  Q24H  4/25/20 13:00


 4/30/20 20:58 DC 4/30/20 13:00


100 MLS/HR


 


 Daptomycin 450 mg/


 Sodium Chloride  50 ml @ 


 100 mls/hr  Q24H  5/17/20 09:00


 5/21/20 08:30 DC 5/20/20 09:25


100 MLS/HR


 


 Daptomycin 485 mg/


 Sodium Chloride  50 ml @ 


 100 mls/hr  Q24H  5/4/20 11:00


 5/12/20 07:44 DC 5/11/20 13:10


100 MLS/HR


 


 Daptomycin 500 mg/


 Sodium Chloride  50 ml @ 


 100 mls/hr  Q24H  7/26/20 09:00


 8/5/20 08:20 DC 8/4/20 09:20


100 MLS/HR


 


 Desflurane


  (Suprane)  90 ml  STK-MED ONCE  6/30/20 10:18


 6/30/20 10:19 DC  





 


 Dexamethasone


 Sodium Phosphate


  (Decadron)  4 mg  STK-MED ONCE  4/27/20 10:56


 4/27/20 10:57 DC  





 


 Dexmedetomidine


 HCl 400 mcg/


 Sodium Chloride  100 ml @ 0


 mls/hr  CONT  PRN  4/2/20 08:15


 5/30/20 18:31 DC 5/30/20 12:57


8 MLS/HR


 


 Dextrose


  (Dextrose


 50%-Water Syringe)  12.5 gm  PRN Q15MIN  PRN  3/16/20 09:30


     





 


 Digoxin


  (Lanoxin)  125 mcg  1X  ONCE  3/19/20 18:00


 3/19/20 18:01 DC 3/19/20 17:10


125 MCG


 


 Diphenhydramine


 HCl


  (Benadryl)  25 mg  1X  ONCE  7/16/20 19:00


 7/16/20 19:01 DC 7/16/20 18:56


25 MG


 


 Duloxetine HCl


  (Cymbalta)  30 mg  DAILY  5/10/20 14:00


 5/13/20 10:25 DC 5/11/20 09:48


30 MG


 


 Enoxaparin Sodium


  (Lovenox 100mg


 Syringe)  100 mg  Q12HR  4/21/20 21:00


   UNV  





 


 Enoxaparin Sodium


  (Lovenox 40mg


 Syringe)  40 mg  Q24H  7/1/20 08:00


    8/12/20 08:28


40 MG


 


 Ephedrine Sulfate


  (ePHEDrine PF IN


 SALINE SYRINGE)  50 mg  STK-MED ONCE  6/30/20 14:45


 6/30/20 14:45 DC  





 


 Etomidate


  (Amidate)  8 mg  1X  ONCE  3/23/20 08:30


 3/23/20 08:31 DC 3/23/20 08:33


8 MG


 


 Fentanyl


  (Duragesic 12mcg/


 Hr Patch)  1 patch  Q3DAYS  7/10/20 09:00


    8/12/20 08:28


1 PATCH


 


 Fentanyl


  (Duragesic 50mcg/


 Hr Patch)  1 patch  Q72H  6/4/20 21:00


 6/13/20 12:00 DC 6/4/20 21:22


1 PATCH


 


 Fentanyl Citrate


  (Fentanyl 2ml


 Vial)  100 mcg  STK-MED ONCE  8/4/20 15:03


 8/4/20 15:03 DC  





 


 Fentanyl Citrate


  (Fentanyl 5ml


 Vial)  250 mcg  1X  ONCE  5/8/20 09:15


 5/8/20 09:16 DC 5/8/20 09:30


50 MCG


 


 Flumazenil


  (Romazicon)  0.5 mg  STK-MED ONCE  6/7/20 14:48


 6/7/20 14:48 DC  





 


 Fluoxetine HCl


  (PROzac)  20 mg  QHS  6/4/20 21:00


    8/14/20 00:32


20 MG


 


 Furosemide


  (Lasix)  40 mg  [id number]  8/6/20 09:00


 8/6/20 14:01 DC 8/6/20 13:51


40 MG


 


 Haloperidol


 Lactate


  (Haldol Inj)  3 mg  1X  ONCE  5/4/20 14:30


 5/4/20 14:31 DC 5/4/20 14:37


3 MG


 


 Heparin Sodium


  (Porcine)


  (Hep Lock Adult)  500 unit  STK-MED ONCE  4/7/20 09:29


 4/7/20 09:30 DC  





 


 Heparin Sodium


  (Porcine)


  (Heparin Sodium)  5,000 unit  Q12HR  4/27/20 21:00


 5/7/20 09:59 DC 5/6/20 20:57


5,000 UNIT


 


 Heparin Sodium


  (Porcine) 1000


 unit/Sodium


 Chloride  1,001 ml @ 


 1,001 mls/hr  1X  ONCE  6/30/20 06:00


 6/30/20 06:59 DC  





 


 Hydromorphone HCl


  (Dilaudid


 Standard PCA)  12 mg  STK-MED ONCE  5/1/20 15:50


 5/12/20 11:24 DC  





 


 Hydromorphone HCl


  (Dilaudid)  1 mg  PRN Q4HRS  PRN  5/4/20 19:00


 5/18/20 17:10 DC 5/18/20 06:25


1 MG


 


 Info


  (CONTRAST GIVEN


 -- Rx MONITORING)  1 each  PRN DAILY  PRN  7/21/20 11:45


 7/23/20 11:44 DC  





 


 Info


  (Icu Electrolyte


 Protocol)  1 ea  CONT PRN  PRN  3/29/20 13:15


     





 


 Info


  (PHARMACY


 MONITORING -- do


 not chart)  1 each  PRN DAILY  PRN  4/24/20 15:45


 5/26/20 14:14 DC  





 


 Info


  (Tpn Per


 Pharmacy)  1 each  PRN DAILY  PRN  3/18/20 12:30


   UNV  





 


 Insulin Human


 Lispro


  (HumaLOG)  0-9 UNITS  Q6HRS  3/16/20 09:30


    8/10/20 19:01


3 UNITS


 


 Insulin Human


 Regular


  (HumuLIN R VIAL)  5 unit  1X  ONCE  3/17/20 22:30


 3/17/20 22:31 DC 3/17/20 22:14


5 UNIT


 


 Iohexol


  (Omnipaque 240


 Mg/ml)  10 ml  1X  ONCE  8/4/20 15:45


 8/4/20 15:46 DC 8/4/20 15:00


10 ML


 


 Iohexol


  (Omnipaque 300


 Mg/ml)  50 ml  1X  ONCE  7/28/20 11:00


 7/28/20 11:01 DC  





 


 Iohexol


  (Omnipaque 350


 Mg/ml)  90 ml  1X  ONCE  3/16/20 03:30


 3/16/20 03:31 DC 3/16/20 03:25


90 ML


 


 Ketorolac


 Tromethamine


  (Toradol 30mg


 Vial)  30 mg  1X  ONCE  3/16/20 03:00


 3/16/20 03:01 DC 3/16/20 02:54


30 MG


 


 Lidocaine HCl


  (Buffered


 Lidocaine 1%)  5 ml  1X  ONCE  8/4/20 15:45


 8/4/20 15:46 DC 8/4/20 15:00


5 ML


 


 Lidocaine HCl


  (Glydo


  (Lidocaine) Jelly)  1 thomas  1X  ONCE  3/20/20 14:30


 3/20/20 14:31 DC 3/20/20 16:38


1 THOMAS


 


 Lidocaine HCl


  (Lidocaine 1%


 20ml Vial)  20 ml  1X  ONCE  6/7/20 15:00


 6/7/20 15:01 DC 6/7/20 15:30


20 ML


 


 Lidocaine HCl


  (Lidocaine Pf 2%


 Vial)  5 ml  STK-MED ONCE  6/30/20 07:44


 6/30/20 07:44 DC  





 


 Lidocaine HCl


  (Xylocaine-Mpf


 1% 2ml Vial)  2 ml  PRN 1X  PRN  4/27/20 07:00


 4/28/20 06:59 DC  





 


 Linezolid/Dextrose  300 ml @ 


 300 mls/hr  Q12HR  5/17/20 09:00


 5/20/20 08:11 DC 5/19/20 21:08


300 MLS/HR


 


 Lorazepam


  (Ativan Inj)  0.25 mg  PRN Q4HRS  PRN  6/3/20 07:30


    8/13/20 15:56


0.25 MG


 


 Magnesium Sulfate  50 ml @ 25


 mls/hr  1X  ONCE  6/30/20 16:30


 6/30/20 18:29 DC 6/30/20 17:02


25 MLS/HR


 


 Meropenem 1 gm/


 Sodium Chloride  100 ml @ 


 200 mls/hr  Q12HR  6/7/20 21:00


 6/25/20 08:56 DC 6/25/20 08:27


200 MLS/HR


 


 Meropenem 500 mg/


 Sodium Chloride  50 ml @ 


 100 mls/hr  Q6HRS  6/28/20 18:00


 7/21/20 08:23 DC 7/21/20 06:15


100 MLS/HR


 


 Methylprednisolone


 Sodium Succinate


  (SOLU-Medrol


 125MG VIAL)  125 mg  1X  ONCE  6/13/20 06:15


 6/13/20 06:16 DC 6/13/20 06:26


125 MG


 


 Metoclopramide HCl


  (Reglan Vial)  10 mg  PRN Q3HRS  PRN  5/9/20 16:45


    5/14/20 04:25


10 MG


 


 Metoprolol


 Tartrate


  (Lopressor Vial)  5 mg  1X  ONCE  8/9/20 10:45


 8/9/20 10:46 DC 8/9/20 10:54


5 MG


 


 Metronidazole  100 ml @ 


 100 mls/hr  Q12HR  8/10/20 10:00


    8/14/20 00:30


100 MLS/HR


 


 Micafungin Sodium


 100 mg/Dextrose  100 ml @ 


 100 mls/hr  Q24H  6/30/20 08:30


 8/5/20 08:20 DC 8/4/20 09:30


100 MLS/HR


 


 Midazolam HCl


  (Versed)  2 mg  1X  ONCE  6/7/20 15:00


 6/7/20 15:01 DC 6/7/20 15:28


1 MG


 


 Midazolam HCl 100


 mg/Sodium Chloride  100 ml @ 1


 mls/hr  CONT  PRN  6/30/20 14:45


 8/3/20 09:45 DC 7/3/20 18:48


10 MLS/HR


 


 Midazolam HCl 50


 mg/Sodium Chloride  50 ml @ 0


 mls/hr  CONT  PRN  3/23/20 08:15


 3/28/20 15:59 DC 3/26/20 22:39


7 MLS/HR


 


 Morphine Sulfate


  (Morphine


 Sulfate)  1 mg  PRN Q1HR  PRN  6/30/20 14:45


 8/3/20 09:45 DC 7/25/20 18:28


1 MG


 


 Multi-Ingred


 Cream/Lotion/Oil/


 Oint


  (Artificial


 Tears Eye


 Ointment)  1 thomas  PRN Q1HR  PRN  3/25/20 17:30


 6/3/20 14:39 DC 4/13/20 08:19


1 THOMAS


 


 Multivitamins/


 Minerals


 Therapeutic


  (Centrum


 Multivit-Mineral


 Liq)  5 ml  DAILY  8/5/20 09:00


    8/13/20 08:43


5 ML


 


 Naloxone HCl


  (Narcan)  0.4 mg  PRN Q2MIN  PRN  6/30/20 14:45


 8/3/20 09:45 DC  





 


 Norepinephrine


 Bitartrate 8 mg/


 Dextrose  258 ml @ 


 13.332 mls/


 hr  CONT  PRN  6/7/20 06:30


 8/3/20 09:45 DC 7/2/20 09:09


1.6 MLS/HR


 


 Ondansetron HCl


  (Zofran)  4 mg  STK-MED ONCE  6/30/20 13:33


 6/30/20 13:33 DC  





 


 Pantoprazole


 Sodium


  (PROTONIX VIAL


 for IV PUSH)  40 mg  DAILYAC  3/16/20 11:30


    8/13/20 08:36


40 MG


 


 Phenylephrine HCl


  (Ken-Synephrine


 Inj)  10 mg  STK-MED ONCE  6/30/20 13:33


 6/30/20 13:33 DC  





 


 Phenylephrine HCl


  (PHENYLEPHRINE


 in 0.9% NACL PF)  1 mg  STK-MED ONCE  6/30/20 14:44


 6/30/20 14:45 DC  





 


 Piperacillin Sod/


 Tazobactam Sod


 3.375 gm/Sodium


 Chloride  50 ml @ 


 100 mls/hr  Q6HRS  5/27/20 12:00


 6/4/20 07:26 DC 6/4/20 06:10


100 MLS/HR


 


 Piperacillin Sod/


 Tazobactam Sod


 4.5 gm/Sodium


 Chloride  100 ml @ 


 200 mls/hr  1X  ONCE  3/16/20 06:00


 3/16/20 06:29 DC 3/16/20 05:44


200 MLS/HR


 


 Potassium


 Chloride 110 meq/


 Magnesium Sulfate


 20 meq/


 Multivitamins 10


 ml/Chromium/


 Copper/Manganese/


 Seleni/Zn 1 ml/


 Insulin Human


 Regular 15 unit/


 Total Parenteral


 Nutrition/Amino


 Acids/Dextrose/


 Fat Emulsion


 Intravenous  1,800 ml @ 


 75 mls/hr  TPN  CONT  5/24/20 22:00


 5/25/20 21:59 DC 5/24/20 22:48


75 MLS/HR


 


 Potassium


 Chloride 15 meq/


 Bicarbonate


 Dialysis Soln w/


 out KCl  5,007.5 ml


  @ 1,000 mls/


 hr  Q5H1M  3/29/20 20:00


 4/2/20 13:08 DC 4/1/20 18:14


1,000 MLS/HR


 


 Potassium


 Chloride 20 meq/


 Bicarbonate


 Dialysis Soln w/


 out KCl  5,010 ml @ 


 1,000 mls/hr  Q5H1M  3/25/20 16:00


 3/29/20 19:59 DC 3/29/20 14:54


1,000 MLS/HR


 


 Potassium


 Chloride 40 meq/


 Potassium Acetate


 60 meq/Magnesium


 Sulfate 10 meq/


 Multivitamins 10


 ml/Chromium/


 Copper/Manganese/


 Seleni/Zn 1 ml/


 Insulin Human


 Regular 20 unit/


 Total Parenteral


 Nutrition/Amino


 Acids/Dextrose/


 Fat Emulsion


 Intravenous  1,800 ml @ 


 75 mls/hr  TPN  CONT  6/4/20 22:00


 6/5/20 21:59 DC 6/5/20 00:03


75 MLS/HR


 


 Potassium


 Chloride 70 meq/


 Magnesium Sulfate


 20 meq/


 Multivitamins 10


 ml/Chromium/


 Copper/Manganese/


 Seleni/Zn 1 ml/


 Insulin Human


 Regular 15 unit/


 Total Parenteral


 Nutrition/Amino


 Acids/Dextrose/


 Fat Emulsion


 Intravenous  1,800 ml @ 


 75 mls/hr  TPN  CONT  5/29/20 22:00


 5/30/20 21:59 DC 5/29/20 23:13


75 MLS/HR


 


 Potassium


 Chloride 75 meq/


 Magnesium Sulfate


 15 meq/


 Multivitamins 10


 ml/Chromium/


 Copper/Manganese/


 Seleni/Zn 0.5 ml/


 Insulin Human


 Regular 15 unit/


 Total Parenteral


 Nutrition/Amino


 Acids/Dextrose/


 Fat Emulsion


 Intravenous  1,920 ml @ 


 80 mls/hr  TPN  CONT  5/9/20 22:00


 5/10/20 21:59 DC 5/9/20 22:41


80 MLS/HR


 


 Potassium


 Chloride 75 meq/


 Magnesium Sulfate


 15 meq/Calcium


 Gluconate 8 meq/


 Multivitamins 10


 ml/Chromium/


 Copper/Manganese/


 Seleni/Zn 0.5 ml/


 Insulin Human


 Regular 15 unit/


 Total Parenteral


 Nutrition/Amino


 Acids/Dextrose/


 Fat Emulsion


 Intravenous  1,920 ml @ 


 80 mls/hr  TPN  CONT  5/7/20 22:00


 5/8/20 21:59 DC 5/7/20 22:28


80 MLS/HR


 


 Potassium


 Chloride 75 meq/


 Magnesium Sulfate


 15 meq/Calcium


 Gluconate 8 meq/


 Multivitamins 10


 ml/Chromium/


 Copper/Manganese/


 Seleni/Zn 0.5 ml/


 Insulin Human


 Regular 20 unit/


 Total Parenteral


 Nutrition/Amino


 Acids/Dextrose/


 Fat Emulsion


 Intravenous  1,920 ml @ 


 80 mls/hr  TPN  CONT  5/6/20 22:00


 5/7/20 21:59 DC 5/6/20 22:00


80 MLS/HR


 


 Potassium


 Chloride 75 meq/


 Magnesium Sulfate


 15 meq/Calcium


 Gluconate 8 meq/


 Multivitamins 10


 ml/Chromium/


 Copper/Manganese/


 Seleni/Zn 0.5 ml/


 Insulin Human


 Regular 25 unit/


 Total Parenteral


 Nutrition/Amino


 Acids/Dextrose/


 Fat Emulsion


 Intravenous  1,920 ml @ 


 80 mls/hr  TPN  CONT  5/4/20 22:00


 5/5/20 21:59 DC 5/4/20 23:08


80 MLS/HR


 


 Potassium


 Chloride 75 meq/


 Magnesium Sulfate


 20 meq/Calcium


 Gluconate 10 meq/


 Multivitamins 10


 ml/Chromium/


 Copper/Manganese/


 Seleni/Zn 0.5 ml/


 Insulin Human


 Regular 25 unit/


 Total Parenteral


 Nutrition/Amino


 Acids/Dextrose/


 Fat Emulsion


 Intravenous  1,920 ml @ 


 80 mls/hr  TPN  CONT  5/3/20 22:00


 5/4/20 21:59 DC 5/3/20 22:04


80 MLS/HR


 


 Potassium


 Chloride 75 meq/


 Magnesium Sulfate


 20 meq/Calcium


 Gluconate 10 meq/


 Multivitamins 10


 ml/Chromium/


 Copper/Manganese/


 Seleni/Zn 0.5 ml/


 Insulin Human


 Regular 30 unit/


 Total Parenteral


 Nutrition/Amino


 Acids/Dextrose/


 Fat Emulsion


 Intravenous  1,920 ml @ 


 80 mls/hr  TPN  CONT  5/2/20 22:00


 5/3/20 22:00 DC 5/2/20 21:51


80 MLS/HR


 


 Potassium


 Chloride 80 meq/


 Magnesium Sulfate


 20 meq/


 Multivitamins 10


 ml/Chromium/


 Copper/Manganese/


 Seleni/Zn 0.5 ml/


 Insulin Human


 Regular 15 unit/


 Total Parenteral


 Nutrition/Amino


 Acids/Dextrose/


 Fat Emulsion


 Intravenous  1,920 ml @ 


 80 mls/hr  TPN  CONT  5/12/20 22:00


 5/13/20 21:59 DC 5/12/20 21:40


80 MLS/HR


 


 Potassium


 Chloride 80 meq/


 Magnesium Sulfate


 20 meq/


 Multivitamins 10


 ml/Chromium/


 Copper/Manganese/


 Seleni/Zn 1 ml/


 Insulin Human


 Regular 15 unit/


 Total Parenteral


 Nutrition/Amino


 Acids/Dextrose/


 Fat Emulsion


 Intravenous  1,800 ml @ 


 75 mls/hr  TPN  CONT  5/31/20 22:00


 6/1/20 21:59 DC 5/31/20 21:54


75 MLS/HR


 


 Potassium


 Chloride 90 meq/


 Magnesium Sulfate


 20 meq/


 Multivitamins 10


 ml/Chromium/


 Copper/Manganese/


 Seleni/Zn 1 ml/


 Insulin Human


 Regular 15 unit/


 Total Parenteral


 Nutrition/Amino


 Acids/Dextrose/


 Fat Emulsion


 Intravenous  1,800 ml @ 


 75 mls/hr  TPN  CONT  5/20/20 22:00


 5/21/20 21:59 DC 5/20/20 22:28


75 MLS/HR


 


 Potassium


 Chloride 90 meq/


 Magnesium Sulfate


 20 meq/


 Multivitamins 10


 ml/Chromium/


 Copper/Manganese/


 Seleni/Zn 1 ml/


 Insulin Human


 Regular 20 unit/


 Total Parenteral


 Nutrition/Amino


 Acids/Dextrose/


 Fat Emulsion


 Intravenous  1,800 ml @ 


 75 mls/hr  TPN  CONT  6/3/20 22:00


 6/4/20 21:59 DC 6/3/20 23:13


75 MLS/HR


 


 Potassium


 Chloride/Water  100 ml @ 


 100 mls/hr  Q1H  6/17/20 08:00


 6/17/20 09:59 DC 6/17/20 09:12


100 MLS/HR


 


 Potassium


 Phosphate 20 mmol/


 Sodium Chloride  106.6667


 ml @ 


 51.667 m...  1X  ONCE  3/25/20 13:00


 3/25/20 15:03 DC 3/25/20 12:51


51.667 MLS/HR


 


 Potassium Acetate


 30 meq/Magnesium


 Sulfate 14 meq/


 Multivitamins 10


 ml/Chromium/


 Copper/Manganese/


 Seleni/Zn 1 ml/


 Insulin Human


 Regular 15 unit/


 Sodium Chloride


 20 meq/Potassium


 Chloride 30 meq/


 Total Parenteral


 Nutrition/Amino


 Acids/Dextrose/


 Fat Emulsion


 Intravenous  1,920 ml @ 


 80 mls/hr  TPN  CONT  6/23/20 22:00


 6/24/20 21:59 DC 6/23/20 21:46


80 MLS/HR


 


 Potassium Acetate


 30 meq/Magnesium


 Sulfate 20 meq/


 Calcium Gluconate


 10 meq/


 Multivitamins 10


 ml/Chromium/


 Copper/Manganese/


 Seleni/Zn 0.5 ml/


 Insulin Human


 Regular 30 unit/


 Potassium


 Chloride 30 meq/


 Total Parenteral


 Nutrition/Amino


 Acids/Dextrose/


 Fat Emulsion


 Intravenous  1,920 ml @ 


 80 mls/hr  TPN  CONT  5/1/20 22:00


 5/2/20 21:59 DC 5/1/20 22:34


80 MLS/HR


 


 Potassium Acetate


 40 meq/Magnesium


 Sulfate 10 meq/


 Multivitamins 10


 ml/Chromium/


 Copper/Manganese/


 Seleni/Zn 1 ml/


 Insulin Human


 Regular 20 unit/


 Total Parenteral


 Nutrition/Amino


 Acids/Dextrose/


 Fat Emulsion


 Intravenous  1,920 ml @ 


 80 mls/hr  TPN  CONT  6/16/20 22:00


 6/17/20 21:59 DC 6/16/20 21:32


80 MLS/HR


 


 Potassium Acetate


 40 meq/Magnesium


 Sulfate 5 meq/


 Multivitamins 10


 ml/Chromium/


 Copper/Manganese/


 Seleni/Zn 1 ml/


 Insulin Human


 Regular 30 unit/


 Total Parenteral


 Nutrition/Amino


 Acids/Dextrose/


 Fat Emulsion


 Intravenous  1,920 ml @ 


 80 mls/hr  TPN  CONT  6/15/20 22:00


 6/16/20 19:34 DC 6/15/20 21:54


80 MLS/HR


 


 Potassium Acetate


 55 meq/Magnesium


 Sulfate 20 meq/


 Calcium Gluconate


 10 meq/


 Multivitamins 10


 ml/Chromium/


 Copper/Manganese/


 Seleni/Zn 0.5 ml/


 Insulin Human


 Regular 30 unit/


 Total Parenteral


 Nutrition/Amino


 Acids/Dextrose/


 Fat Emulsion


 Intravenous  1,920 ml @ 


 80 mls/hr  TPN  CONT  4/30/20 22:00


 5/1/20 21:59 DC 5/1/20 01:00


80 MLS/HR


 


 Potassium Acetate


 55 meq/Magnesium


 Sulfate 20 meq/


 Calcium Gluconate


 10 meq/


 Multivitamins 10


 ml/Chromium/


 Copper/Manganese/


 Seleni/Zn 0.5 ml/


 Insulin Human


 Regular 35 unit/


 Total Parenteral


 Nutrition/Amino


 Acids/Dextrose/


 Fat Emulsion


 Intravenous  1,920 ml @ 


 80 mls/hr  TPN  CONT  4/28/20 22:00


 4/29/20 21:59 DC 4/28/20 22:02


80 MLS/HR


 


 Potassium Acetate


 60 meq/Magnesium


 Sulfate 10 meq/


 Multivitamins 10


 ml/Chromium/


 Copper/Manganese/


 Seleni/Zn 1 ml/


 Insulin Human


 Regular 20 unit/


 Total Parenteral


 Nutrition/Amino


 Acids/Dextrose/


 Fat Emulsion


 Intravenous  1,920 ml @ 


 80 mls/hr  TPN  CONT  6/17/20 22:00


 6/18/20 21:59 DC 6/17/20 21:55


80 MLS/HR


 


 Potassium Acetate


 60 meq/Magnesium


 Sulfate 14 meq/


 Multivitamins 10


 ml/Chromium/


 Copper/Manganese/


 Seleni/Zn 1 ml/


 Insulin Human


 Regular 15 unit/


 Sodium Chloride


 20 meq/Total


 Parenteral


 Nutrition/Amino


 Acids/Dextrose/


 Fat Emulsion


 Intravenous  1,920 ml @ 


 80 mls/hr  TPN  CONT  6/22/20 22:00


 6/23/20 21:59 DC 6/22/20 21:54


80 MLS/HR


 


 Potassium Acetate


 60 meq/Magnesium


 Sulfate 14 meq/


 Multivitamins 10


 ml/Chromium/


 Copper/Manganese/


 Seleni/Zn 1 ml/


 Insulin Human


 Regular 15 unit/


 Total Parenteral


 Nutrition/Amino


 Acids/Dextrose/


 Fat Emulsion


 Intravenous  1,920 ml @ 


 80 mls/hr  TPN  CONT  6/21/20 22:00


 6/22/20 21:59 DC 6/21/20 22:22


80 MLS/HR


 


 Potassium Acetate


 60 meq/Magnesium


 Sulfate 14 meq/


 Multivitamins 10


 ml/Chromium/


 Copper/Manganese/


 Seleni/Zn 1 ml/


 Insulin Human


 Regular 20 unit/


 Total Parenteral


 Nutrition/Amino


 Acids/Dextrose/


 Fat Emulsion


 Intravenous  1,920 ml @ 


 80 mls/hr  TPN  CONT  6/18/20 22:00


 6/19/20 21:59 DC 6/18/20 22:26


80 MLS/HR


 


 Potassium Acetate


 60 meq/Magnesium


 Sulfate 5 meq/


 Multivitamins 10


 ml/Chromium/


 Copper/Manganese/


 Seleni/Zn 1 ml/


 Insulin Human


 Regular 30 unit/


 Total Parenteral


 Nutrition/Amino


 Acids/Dextrose/


 Fat Emulsion


 Intravenous  1,920 ml @ 


 80 mls/hr  TPN  CONT  6/6/20 22:00


 6/7/20 21:59 DC 6/6/20 21:54


80 MLS/HR


 


 Potassium Acetate


 65 meq/Magnesium


 Sulfate 20 meq/


 Calcium Gluconate


 10 meq/


 Multivitamins 10


 ml/Chromium/


 Copper/Manganese/


 Seleni/Zn 0.5 ml/


 Insulin Human


 Regular 30 unit/


 Total Parenteral


 Nutrition/Amino


 Acids/Dextrose/


 Fat Emulsion


 Intravenous  1,920 ml @ 


 80 mls/hr  TPN  CONT  4/29/20 22:00


 4/30/20 21:59 DC 4/29/20 22:22


80 MLS/HR


 


 Potassium Acetate


 80 meq/Magnesium


 Sulfate 5 meq/


 Multivitamins 10


 ml/Chromium/


 Copper/Manganese/


 Seleni/Zn 1 ml/


 Insulin Human


 Regular 20 unit/


 Total Parenteral


 Nutrition/Amino


 Acids/Dextrose/


 Fat Emulsion


 Intravenous  1,920 ml @ 


 80 mls/hr  TPN  CONT  6/5/20 22:00


 6/6/20 21:59 DC 6/5/20 21:59


80 MLS/HR


 


 Prochlorperazine


 Edisylate


  (Compazine)  5 mg  PACU PRN  PRN  4/27/20 07:00


 4/28/20 06:59 DC  





 


 Propofol


  (Diprivan)  200 mg  STK-MED ONCE  6/30/20 07:44


 6/30/20 07:44 DC  





 


 Ringer's Solution  1,000 ml @ 


 175 mls/hr  [id number]  8/11/20 14:30


    8/14/20 05:58


175 MLS/HR


 


 Rocuronium Bromide


  (Zemuron)  100 mg  STK-MED ONCE  6/30/20 07:44


 6/30/20 07:44 DC  





 


 Saliva Substitute


  (Biotene


 Moisturizing


 Mouth)  2 spray  PRN Q15MIN  PRN  5/21/20 11:00


     





 


 Sevoflurane


  (Ultane)  60 ml  STK-MED ONCE  4/27/20 12:26


 4/27/20 12:27 DC  





 


 Sodium


 Bicarbonate 150


 meq/Dextrose  1,150 ml @ 


 75 mls/hr  1X  ONCE  6/30/20 16:30


 7/1/20 07:49 DC 6/30/20 20:02


75 MLS/HR


 


 Sodium


 Bicarbonate 50


 meq/Sodium


 Chloride  1,050 ml @ 


 75 mls/hr  Q14H  3/18/20 07:30


 3/23/20 10:28 DC 3/22/20 21:10


75 MLS/HR


 


 Sodium Acetate 50


 meq/Potassium


 Acetate 55 meq/


 Magnesium Sulfate


 20 meq/Calcium


 Gluconate 10 meq/


 Multivitamins 10


 ml/Chromium/


 Copper/Manganese/


 Seleni/Zn 0.5 ml/


 Insulin Human


 Regular 35 unit/


 Total Parenteral


 Nutrition/Amino


 Acids/Dextrose/


 Fat Emulsion


 Intravenous  1,800 ml @ 


 75 mls/hr  TPN  CONT  4/25/20 22:00


 4/26/20 21:59 DC 4/25/20 22:03


75 MLS/HR


 


 Sodium Bicarbonate


  (Sodium Bicarb


 Adult 8.4% Syr)  50 meq  STK-MED ONCE  8/11/20 14:30


 8/11/20 14:30 DC  





 


 Sodium Chloride  1,000 ml @ 


 1,000 mls/hr  1X  ONCE  8/13/20 18:00


 8/13/20 18:59 DC 8/13/20 17:36


1,000 MLS/HR


 


 Sodium Chloride


  (Normal Saline


 Flush)  3 ml  QSHIFT  PRN  6/30/20 14:45


 8/3/20 09:45 DC  





 


 Sodium Chloride


 80 meq/Potassium


 Chloride 30 meq/


 Potassium Acetate


 30 meq/Magnesium


 Sulfate 14 meq/


 Multivitamins 10


 ml/Chromium/


 Copper/Manganese/


 Seleni/Zn 1 ml/


 Insulin Human


 Regular 15 unit/


 Total Parenteral


 Nutrition/Amino


 Acids/Dextrose/


 Fat Emulsion


 Intravenous  1,920 ml @ 


 80 mls/hr  TPN  CONT  7/1/20 22:00


 7/2/20 21:59 DC 7/1/20 23:05


80 MLS/HR


 


 Sodium Chloride


 90 meq/Calcium


 Gluconate 10 meq/


 Multivitamins 10


 ml/Chromium/


 Copper/Manganese/


 Seleni/Zn 0.5 ml/


 Total Parenteral


 Nutrition/Amino


 Acids/Dextrose/


 Fat Emulsion


 Intravenous  1,512 ml @ 


 63 mls/hr  TPN  CONT  3/18/20 22:00


 3/19/20 21:59 DC 3/18/20 22:06


63 MLS/HR


 


 Sodium Chloride


 90 meq/Calcium


 Gluconate 10 meq/


 Multivitamins 10


 ml/Chromium/


 Copper/Manganese/


 Seleni/Zn 1 ml/


 Total Parenteral


 Nutrition/Amino


 Acids/Dextrose/


 Fat Emulsion


 Intravenous  55.005 ml


  @ 2.292


 mls/hr  TPN  CONT  3/18/20 22:00


 3/18/20 12:33 DC  





 


 Sodium Chloride


 90 meq/Magnesium


 Sulfate 10 meq/


 Calcium Gluconate


 20 meq/


 Multivitamins 10


 ml/Chromium/


 Copper/Manganese/


 Seleni/Zn 0.5 ml/


 Total Parenteral


 Nutrition/Amino


 Acids/Dextrose/


 Fat Emulsion


 Intravenous  1,512 ml @ 


 63 mls/hr  TPN  CONT  3/19/20 22:00


 3/20/20 21:59 DC 3/19/20 22:25


63 MLS/HR


 


 Sodium Chloride


 90 meq/Magnesium


 Sulfate 12 meq/


 Calcium Gluconate


 15 meq/


 Multivitamins 10


 ml/Chromium/


 Copper/Manganese/


 Seleni/Zn 0.5 ml/


 Insulin Human


 Regular 25 unit/


 Total Parenteral


 Nutrition/Amino


 Acids/Dextrose/


 Fat Emulsion


 Intravenous  1,400 ml @ 


 58.333 mls/


 hr  TPN  CONT  4/8/20 22:00


 4/9/20 21:59 DC 4/8/20 21:41


58.333 MLS/HR


 


 Sodium Chloride


 90 meq/Potassium


 Chloride 15 meq/


 Magnesium Sulfate


 12 meq/Calcium


 Gluconate 15 meq/


 Multivitamins 10


 ml/Chromium/


 Copper/Manganese/


 Seleni/Zn 0.5 ml/


 Insulin Human


 Regular 25 unit/


 Total Parenteral


 Nutrition/Amino


 Acids/Dextrose/


 Fat Emulsion


 Intravenous  1,400 ml @ 


 58.333 mls/


 hr  TPN  CONT  4/7/20 22:00


 4/8/20 21:59 DC 4/7/20 22:13


58.333 MLS/HR


 


 Sodium Chloride


 90 meq/Potassium


 Chloride 15 meq/


 Potassium


 Phosphate 10 mmol/


 Magnesium Sulfate


 8 meq/Calcium


 Gluconate 15 meq/


 Multivitamins 10


 ml/Chromium/


 Copper/Manganese/


 Seleni/Zn 0.5 ml/


 Insulin Human


 Regular 25 unit/


 Total Parenteral


 Nutrition/Amino


 Acids/Dextrose/


 Fat Emulsion


 Intravenous  1,400 ml @ 


 58.333 mls/


 hr  TPN  CONT  4/5/20 22:00


 4/6/20 21:59 DC 4/5/20 21:20


58.333 MLS/HR


 


 Sodium Chloride


 90 meq/Potassium


 Chloride 15 meq/


 Potassium


 Phosphate 10 mmol/


 Magnesium Sulfate


 10 meq/Calcium


 Gluconate 20 meq/


 Multivitamins 10


 ml/Chromium/


 Copper/Manganese/


 Seleni/Zn 0.5 ml/


 Total Parenteral


 Nutrition/Amino


 Acids/Dextrose/


 Fat Emulsion


 Intravenous  1,400 ml @ 


 58.333 mls/


 hr  TPN  CONT  3/23/20 22:00


 3/24/20 21:59 DC 3/23/20 21:42


58.333 MLS/HR


 


 Sodium Chloride


 90 meq/Potassium


 Chloride 15 meq/


 Potassium


 Phosphate 10 mmol/


 Magnesium Sulfate


 12 meq/Calcium


 Gluconate 15 meq/


 Multivitamins 10


 ml/Chromium/


 Copper/Manganese/


 Seleni/Zn 0.5 ml/


 Insulin Human


 Regular 25 unit/


 Total Parenteral


 Nutrition/Amino


 Acids/Dextrose/


 Fat Emulsion


 Intravenous  1,400 ml @ 


 58.333 mls/


 hr  TPN  CONT  4/6/20 22:00


 4/7/20 21:59 DC 4/6/20 22:24


58.333 MLS/HR


 


 Sodium Chloride


 90 meq/Potassium


 Chloride 15 meq/


 Potassium


 Phosphate 15 mmol/


 Magnesium Sulfate


 10 meq/Calcium


 Gluconate 15 meq/


 Multivitamins 10


 ml/Chromium/


 Copper/Manganese/


 Seleni/Zn 0.5 ml/


 Total Parenteral


 Nutrition/Amino


 Acids/Dextrose/


 Fat Emulsion


 Intravenous  1,400 ml @ 


 58.333 mls/


 hr  TPN  CONT  3/24/20 22:00


 3/25/20 21:59 DC 3/24/20 22:17


58.333 MLS/HR


 


 Sodium Chloride


 90 meq/Potassium


 Chloride 15 meq/


 Potassium


 Phosphate 15 mmol/


 Magnesium Sulfate


 10 meq/Calcium


 Gluconate 20 meq/


 Multivitamins 10


 ml/Chromium/


 Copper/Manganese/


 Seleni/Zn 0.5 ml/


 Total Parenteral


 Nutrition/Amino


 Acids/Dextrose/


 Fat Emulsion


 Intravenous  1,200 ml @ 


 50 mls/hr  TPN  CONT  3/22/20 22:00


 3/22/20 14:17 DC  





 


 Sodium Chloride


 90 meq/Potassium


 Chloride 15 meq/


 Potassium


 Phosphate 18 mmol/


 Magnesium Sulfate


 8 meq/Calcium


 Gluconate 15 meq/


 Multivitamins 10


 ml/Chromium/


 Copper/Manganese/


 Seleni/Zn 0.5 ml/


 Insulin Human


 Regular 10 unit/


 Total Parenteral


 Nutrition/Amino


 Acids/Dextrose/


 Fat Emulsion


 Intravenous  1,400 ml @ 


 58.333 mls/


 hr  TPN  CONT  3/27/20 22:00


 3/28/20 21:59 DC 3/27/20 21:43


58.333 MLS/HR


 


 Sodium Chloride


 90 meq/Potassium


 Chloride 15 meq/


 Potassium


 Phosphate 18 mmol/


 Magnesium Sulfate


 8 meq/Calcium


 Gluconate 15 meq/


 Multivitamins 10


 ml/Chromium/


 Copper/Manganese/


 Seleni/Zn 0.5 ml/


 Insulin Human


 Regular 15 unit/


 Total Parenteral


 Nutrition/Amino


 Acids/Dextrose/


 Fat Emulsion


 Intravenous  1,400 ml @ 


 58.333 mls/


 hr  TPN  CONT  3/30/20 22:00


 3/31/20 21:59 DC 3/30/20 21:47


58.333 MLS/HR


 


 Sodium Chloride


 90 meq/Potassium


 Chloride 15 meq/


 Potassium


 Phosphate 18 mmol/


 Magnesium Sulfate


 8 meq/Calcium


 Gluconate 15 meq/


 Multivitamins 10


 ml/Chromium/


 Copper/Manganese/


 Seleni/Zn 0.5 ml/


 Insulin Human


 Regular 20 unit/


 Total Parenteral


 Nutrition/Amino


 Acids/Dextrose/


 Fat Emulsion


 Intravenous  1,400 ml @ 


 58.333 mls/


 hr  TPN  CONT  4/2/20 22:00


 4/3/20 21:59 DC 4/2/20 22:45


58.333 MLS/HR


 


 Sodium Chloride


 90 meq/Potassium


 Chloride 15 meq/


 Potassium


 Phosphate 18 mmol/


 Magnesium Sulfate


 8 meq/Calcium


 Gluconate 15 meq/


 Multivitamins 10


 ml/Chromium/


 Copper/Manganese/


 Seleni/Zn 0.5 ml/


 Total Parenteral


 Nutrition/Amino


 Acids/Dextrose/


 Fat Emulsion


 Intravenous  1,400 ml @ 


 58.333 mls/


 hr  TPN  CONT  3/26/20 22:00


 3/27/20 21:59 DC 3/26/20 22:00


58.333 MLS/HR


 


 Sodium Chloride


 90 meq/Potassium


 Chloride 30 meq/


 Potassium Acetate


 30 meq/Magnesium


 Sulfate 15 meq/


 Multivitamins 10


 ml/Chromium/


 Copper/Manganese/


 Seleni/Zn 1 ml/


 Insulin Human


 Regular 15 unit/


 Total Parenteral


 Nutrition/Amino


 Acids/Dextrose/


 Fat Emulsion


 Intravenous  1,680 ml @ 


 70 mls/hr  TPN  CONT  7/16/20 22:00


 7/17/20 21:59 DC 7/16/20 22:06


70 MLS/HR


 


 Sodium Chloride


 90 meq/Potassium


 Phosphate 15 mmol/


 Magnesium Sulfate


 12 meq/Calcium


 Gluconate 15 meq/


 Multivitamins 10


 ml/Chromium/


 Copper/Manganese/


 Seleni/Zn 0.5 ml/


 Insulin Human


 Regular 30 unit/


 Total Parenteral


 Nutrition/Amino


 Acids/Dextrose/


 Fat Emulsion


 Intravenous  1,400 ml @ 


 58.333 mls/


 hr  TPN  CONT  4/10/20 22:00


 4/11/20 21:59 DC 4/10/20 21:49


58.333 MLS/HR


 


 Sodium Chloride


 90 meq/Potassium


 Phosphate 15 mmol/


 Magnesium Sulfate


 12 meq/Calcium


 Gluconate 15 meq/


 Multivitamins 10


 ml/Chromium/


 Copper/Manganese/


 Seleni/Zn 0.5 ml/


 Insulin Human


 Regular 40 unit/


 Total Parenteral


 Nutrition/Amino


 Acids/Dextrose/


 Fat Emulsion


 Intravenous  1,400 ml @ 


 58.333 mls/


 hr  TPN  CONT  4/11/20 22:00


 4/12/20 21:59 DC 4/11/20 21:21


58.333 MLS/HR


 


 Sodium Chloride


 90 meq/Potassium


 Phosphate 19 mmol/


 Magnesium Sulfate


 12 meq/Calcium


 Gluconate 15 meq/


 Multivitamins 10


 ml/Chromium/


 Copper/Manganese/


 Seleni/Zn 0.5 ml/


 Insulin Human


 Regular 40 unit/


 Total Parenteral


 Nutrition/Amino


 Acids/Dextrose/


 Fat Emulsion


 Intravenous  1,400 ml @ 


 58.333 mls/


 hr  TPN  CONT  4/12/20 22:00


 4/13/20 21:59 DC 4/12/20 21:54


58.333 MLS/HR


 


 Sodium Chloride


 90 meq/Potassium


 Phosphate 5 mmol/


 Magnesium Sulfate


 12 meq/Calcium


 Gluconate 15 meq/


 Multivitamins 10


 ml/Chromium/


 Copper/Manganese/


 Seleni/Zn 0.5 ml/


 Insulin Human


 Regular 30 unit/


 Total Parenteral


 Nutrition/Amino


 Acids/Dextrose/


 Fat Emulsion


 Intravenous  1,400 ml @ 


 58.333 mls/


 hr  TPN  CONT  4/9/20 22:00


 4/10/20 21:59 DC 4/9/20 22:08


58.333 MLS/HR


 


 Sodium Chloride


 100 meq/Potassium


 Chloride 30 meq/


 Potassium Acetate


 30 meq/Magnesium


 Sulfate 12 meq/


 Multivitamins 10


 ml/Chromium/


 Copper/Manganese/


 Seleni/Zn 1 ml/


 Insulin Human


 Regular 15 unit/


 Total Parenteral


 Nutrition/Amino


 Acids/Dextrose/


 Fat Emulsion


 Intravenous  1,680 ml @ 


 70 mls/hr  TPN  CONT  7/5/20 22:00


 7/6/20 21:59 DC 7/5/20 21:23


70 MLS/HR


 


 Sodium Chloride


 100 meq/Potassium


 Chloride 40 meq/


 Magnesium Sulfate


 15 meq/Calcium


 Gluconate 15 meq/


 Multivitamins 10


 ml/Chromium/


 Copper/Manganese/


 Seleni/Zn 0.5 ml/


 Insulin Human


 Regular 35 unit/


 Total Parenteral


 Nutrition/Amino


 Acids/Dextrose/


 Fat Emulsion


 Intravenous  1,400 ml @ 


 58.333 mls/


 hr  TPN  CONT  4/19/20 22:00


 4/20/20 21:59 DC 4/19/20 22:46


58.333 MLS/HR


 


 Sodium Chloride


 100 meq/Potassium


 Chloride 40 meq/


 Magnesium Sulfate


 20 meq/Calcium


 Gluconate 10 meq/


 Multivitamins 10


 ml/Chromium/


 Copper/Manganese/


 Seleni/Zn 0.5 ml/


 Insulin Human


 Regular 35 unit/


 Total Parenteral


 Nutrition/Amino


 Acids/Dextrose/


 Fat Emulsion


 Intravenous  1,400 ml @ 


 58.333 mls/


 hr  TPN  CONT  4/23/20 22:00


 4/24/20 21:59 DC 4/24/20 00:06


58.333 MLS/HR


 


 Sodium Chloride


 100 meq/Potassium


 Chloride 40 meq/


 Magnesium Sulfate


 20 meq/Calcium


 Gluconate 15 meq/


 Multivitamins 10


 ml/Chromium/


 Copper/Manganese/


 Seleni/Zn 0.5 ml/


 Insulin Human


 Regular 35 unit/


 Total Parenteral


 Nutrition/Amino


 Acids/Dextrose/


 Fat Emulsion


 Intravenous  1,400 ml @ 


 58.333 mls/


 hr  TPN  CONT  4/22/20 22:00


 4/23/20 21:59 DC 4/22/20 22:27


58.333 MLS/HR


 


 Sodium Chloride


 100 meq/Potassium


 Phosphate 10 mmol/


 Magnesium Sulfate


 12 meq/Calcium


 Gluconate 15 meq/


 Multivitamins 10


 ml/Chromium/


 Copper/Manganese/


 Seleni/Zn 0.5 ml/


 Insulin Human


 Regular 35 unit/


 Potassium


 Chloride 20 meq/


 Total Parenteral


 Nutrition/Amino


 Acids/Dextrose/


 Fat Emulsion


 Intravenous  1,400 ml @ 


 58.333 mls/


 hr  TPN  CONT  4/16/20 22:00


 4/17/20 21:59 DC 4/16/20 22:10


58.333 MLS/HR


 


 Sodium Chloride


 100 meq/Potassium


 Phosphate 19 mmol/


 Magnesium Sulfate


 12 meq/Calcium


 Gluconate 15 meq/


 Multivitamins 10


 ml/Chromium/


 Copper/Manganese/


 Seleni/Zn 0.5 ml/


 Insulin Human


 Regular 40 unit/


 Potassium


 Chloride 20 meq/


 Total Parenteral


 Nutrition/Amino


 Acids/Dextrose/


 Fat Emulsion


 Intravenous  1,400 ml @ 


 58.333 mls/


 hr  TPN  CONT  4/15/20 22:00


 4/16/20 21:59 DC 4/15/20 21:20


58.333 MLS/HR


 


 Sodium Chloride


 100 meq/Potassium


 Phosphate 5 mmol/


 Magnesium Sulfate


 12 meq/Calcium


 Gluconate 15 meq/


 Multivitamins 10


 ml/Chromium/


 Copper/Manganese/


 Seleni/Zn 0.5 ml/


 Insulin Human


 Regular 35 unit/


 Potassium


 Chloride 20 meq/


 Total Parenteral


 Nutrition/Amino


 Acids/Dextrose/


 Fat Emulsion


 Intravenous  1,400 ml @ 


 58.333 mls/


 hr  TPN  CONT  4/17/20 22:00


 4/18/20 21:59 DC 4/17/20 22:59


58.333 MLS/HR


 


 Sodium Chloride


 110 meq/Potassium


 Chloride 30 meq/


 Potassium Acetate


 30 meq/Magnesium


 Sulfate 15 meq/


 Multivitamins 10


 ml/Chromium/


 Copper/Manganese/


 Seleni/Zn 1 ml/


 Insulin Human


 Regular 15 unit/


 Total Parenteral


 Nutrition/Amino


 Acids/Dextrose/


 Fat Emulsion


 Intravenous  1,680 ml @ 


 70 mls/hr  TPN  CONT  7/18/20 22:00


 7/19/20 21:59 DC 7/18/20 22:01


70 MLS/HR


 


 Sodium Chloride


 110 meq/Sodium


 Phosphate 10 mmol/


 Potassium


 Chloride 30 meq/


 Potassium Acetate


 30 meq/Magnesium


 Sulfate 15 meq/


 Multivitamins 10


 ml/Chromium/


 Copper/Manganese/


 Seleni/Zn 1 ml/


 Insulin Human


 Regular 15 unit/


 Total Parenteral


 Nutrition/Amino


 Acids/Dextrose/


 Fat Emulsion


 Intravenous  1,680 ml @ 


 70 mls/hr  TPN  CONT  7/19/20 22:00


 7/20/20 21:59 DC 7/19/20 22:03


70 MLS/HR


 


 Sodium Chloride


 120 meq/Sodium


 Phosphate 10 mmol/


 Potassium


 Chloride 30 meq/


 Potassium Acetate


 30 meq/Magnesium


 Sulfate 15 meq/


 Multivitamins 10


 ml/Chromium/


 Copper/Manganese/


 Seleni/Zn 1 ml/


 Insulin Human


 Regular 15 unit/


 Total Parenteral


 Nutrition/Amino


 Acids/Dextrose/


 Fat Emulsion


 Intravenous  1,680 ml @ 


 70 mls/hr  TPN  CONT  7/26/20 22:00


 7/27/20 21:59 Cancel  





 


 Succinylcholine


 Chloride


  (Anectine)  120 mg  1X  ONCE  3/23/20 08:30


 3/23/20 08:31 DC 3/23/20 08:34


120 MG


 


 Vancomycin HCl


  (Vanco Per


 Pharmacy)  1 each  PRN DAILY  PRN  7/12/20 09:15


 7/15/20 07:41 DC 7/14/20 02:46


1 EACH


 


 Vancomycin HCl


  (Vancomycin


 Random Level)  1 each  1X  ONCE  7/14/20 01:00


 7/14/20 01:01 DC 7/14/20 01:00


1 EACH


 


 Vancomycin HCl


  (Vancomycin


 Trough Level)  1 each  1X  ONCE  7/15/20 09:30


 7/15/20 09:31 Cancel  





 


 Vancomycin HCl


 1.5 gm/Sodium


 Chloride  500 ml @ 


 250 mls/hr  Q12H  7/14/20 10:00


 7/15/20 07:41 DC 7/14/20 22:07


250 MLS/HR


 


 Vancomycin HCl 2


 gm/Sodium Chloride  500 ml @ 


 250 mls/hr  1X  ONCE  7/12/20 10:00


 7/12/20 11:59 DC 7/12/20 10:34


250 MLS/HR


 


 Vasopressin


  (Vasostrict)  20 unit  STK-MED ONCE  6/30/20 12:23


 6/30/20 12:23 DC  





 


 Vasopressin 20


 unit/Dextrose  101 ml @ 


 12 mls/hr  CONT  PRN  6/30/20 15:30


 8/3/20 09:45 DC 7/7/20 04:17


12 MLS/HR


 


 Vecuronium Bromide


  (Norcuron Bolus)  6 mg  PRN Q6HRS  PRN  5/7/20 19:15


 5/7/20 19:35 DC  





 


 Vitamin A/Vitamin


 D


  (Vitamin A & D


 Ointment)  1 thomas  PRN Q1HR  PRN  8/4/20 09:15


    8/13/20 08:36


1 THOMAS


 


 Zoledronic Acid  100 ml @ 


 400 mls/hr  1X  ONCE  8/7/20 12:00


 8/7/20 12:14 DC 8/7/20 13:04


400 MLS/HR











Labs:


Lab





Laboratory Tests








Test


 8/13/20


11:14 8/13/20


17:51 8/13/20


22:54 8/14/20


07:22


 


Glucose (Fingerstick)


 144 mg/dL


(70-99) 167 mg/dL


(70-99) 150 mg/dL


(70-99) 205 mg/dL


(70-99)








Micro


        NEG ANSON 56


        PSEUDOMONAS AERUGINOSA


        ANTIBIOTIC                        RESULT          INTERPRETATION


        AMIKACIN                          <=16                  S


        AZTREONAM                         >16                   R


        CEFTAZIDIME                       >16                   R


        CIPROFLOXACIN                     <=0.25                S


        CEFEPIME                          16                    I


        GENTAMICIN                        <=2                   S


        LEVOFLOXACIN                      <=0.5                 S














                               ** CONTINUED ON NEXT PAGE **





 

--------------------------------------------------------------------------------


------------





RUN DATE: 06/10/20                  [facility] *LIVE*               

  PAGE 2   


RUN TIME: 1121                            Specimen Inquiry                    


 

--------------------------------------------------------------------------------


------------





SPEC: [id number]    PATIENT: JESENIA BEAN                [id number]  

(Continued)


---------------------------------------------------

-----------------------------------------








 

--------------------------------------------------------------------------------


------------





  Procedure                         Result                                      

         


---------------------------

-----------------------------------------------------------------





  ANTIMICROBIAL SUSCEPTIBILITY  Preliminary   (continued)


        MEROPENEM                         <=1                   S


        PIPERACILLIN/TAZOBACTAM           64                    S


        TOBRAMYCIN                        <=2                   S


        Unless otherwise specified, Testing Performed by:


        [address] [address]


        For Inquires, the Physician may contact the Microbiology


        department at [phone]





----------------------------------------------------------------

----------------------------





Objective:


Assessment:


Patient with prolonged hospitalization more than 4 months


Multiple medical problems


Multiple surgical procedures





  


Intermittent fevers now improving


S/P Exp. REN Crews chole, G-J tube & pancreatic necrosectomy on 6/30, C. 

parapsilosis & PSAE (I-merrem/ceftazidime/AZT/cefepime))


Leukocytosis - better


Loose stool on tube feed - WBC down and no gross fever


Anemia


Acute gallstone pancreatitis with persistent necrosis


  - 4/9.  CT A/P Increased ascites. Persistent evidence of necrotizing 

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


  - 4/27. status post ROBERT drain placement; C. parapsilosis. s/p drain 5/6 + yeast

& high amylase; s/p additional drain on 5/8. Drains removed. 


  -5/6. fluid  candida parapsilosis fluid, amylase high


  - 6/6 showed multiple pseudocysts, slight larger on the right. s/p drains x 3,

6/7.  + PSAE (MDRO-R Cefepime, Zosyn ANSON < 64) and yeast, 


  -6/7 s/p drain replacement x 3; fluid cult PSAE (MDRO), yeast; treated


  -7/12 CT A/P shows smaller fluid collections.  


  -722 CT abdomen and pelvis drains in place       


Ascites s/p paracentesis 4/15 & 5/6. C. parapsilosis 


Cholelithiasis with thickening of the gallbladder wall.


JED, Hyperkalemia, Metabolic acidosis off dialysis


Acute hypoxic resp failure. trach/vent. sputum 6/13  + PSAE (I merrem) ; sputum 

culture July 19+ for PSAE R Merrem, sensitive to cefepime


Pleural effusion status post CTS left side


 Abdominal fluid culture 6 /7 MDRO Pseudomonas, yeast


Sputum culture positive 7/19 for MDRO Pseudomonas


Chest tube fluid positive for 7/21 Candida Parapsilosis


EC fistula


Severe PCM


Critical illness myopathy


Gen debility


Last urine culture Candida parapsilosis [place] changed


Diarrhea C. difficile negative





Plan:


Plan of Care





Continue cefepime, renal dosing as needed and flagyl 8/10,


C diff neg 8/12


BC negative so far


Follow-up cultures and monitor labs


[place] changed 8/11


Nystatin to groin


Right upper extremity PICC line placed 7/31


Wound care /drain management as directed


Contact isolation for CRE/MDRO








Long term prognosis  poor





D/w nursing











IVAN FRANZ MD           Aug 14, 2020 08:06

## 2020-08-14 NOTE — PDOC
PROGRESS NOTES


Date of Service:


DATE: 8/14/20 


TIME: 12:51





Chief Complaint


Chief Complaint


S/P Exp. 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 

parapsilosis & PSAE 07/26 (I-merrem/ceftazidime/AZT/cefepime))


Leucocytosis 


Fevers, intermittent


Acute gallstone pancreatitis with persistent necrosis


Acute hypoxic Respiratory failure required mechanical ventilation


Tracheostomy 


Bilateral pleural effusions/pulm edema s/p Throacentesis on 6/15/2020


HTN 


Intractable pain


Intractable nausea


Covid 19 negative


Acute on chronic anemia 


Abd distention - U/S and CT reviewed s/p 0.4 L of opaque, debris-containing 

ascites was removed 5/6


Gallstone pancreatitis with necrosis. 


   -CT A/P 6/6 showed multiple pseudocysts, slight larger on the right. s/p 

drains x 3, 6/7.  + PSAE (MDRO-R Cefepime, Zosyn ANSON < 64) and yeast, 


   -s/p drain 4/27. C. parapsilosis. s/p drain 5/6 + yeast & high amylase; s/p 

additional drain on 5/8. Drains removed. 


Ascites s/p paracentesis 4/15 & 5/6. C. parapsilosis 


JED. off HD. 


A large fluid collection in the pancreatic bed has slightly decreased in size, 

described below, the pancreas itself is difficult to  visualize, which could be 

due to necrosis or obscuration of pancreatic  parenchyma from the surrounding 

fluid collection.6/15 


- 4/27 status post ROBERT drain placement + C paropsilosis. s/p additional drains 

5/8


Hypocalcemia 


Prediabetes


s/p trach


Hyperglycemia


Severe protein-caloric malnutrition


Extensive retroperitoneal fluid collections persist. Percutaneous  drains remain

within the collections in both paracolic gutters. These  communicate with 

additional pelvic and peripancreatic collections.





History of Present Illness


History of Present Illness


8/14  Will check Hb/Hct levels today in response to prbc; patient largely 

nonverbal but denies pain


8/13,   increased IV fluid, cont current


bolusing daily ,  LR increased


cr better,    Hgb worse today, will give 1 u PRBC





8/12.   pain in legs,    BP better,  some better Urine outptu





8/11.   dyspnea and mild distress worsened this AM.  but was able to walk some 

with PT later.


still  sometimes,    








08/10/2020


Patient seen and examined


Afebrile


Resting in NAD


Tachycardic and tachypneic overnight


Recurring leukocytosis, 23.0


Chart reviewed


Discussed with RN





08/9: Afebrile.  Calcium down to 10.1.  Little more tachycardic today with some 

more secretions. No O2 requirements. Does not wish to wear her speaking valve. 

Will check CXR.


8/8: Afebrile, weak, having more secretions not wearing a speaking valve today. 

WBC 10, Hb 8.8, , and K3.7, BUN 12, CR 0.5, calcium down to 10.3.  After 

zoledronic acid


8/7: Had a rash after calcitonin injection.  Discussed with nephrology with her 

calcium moving from 11.3-10.9 will give IV bisphosphonate today, zoledronic 

acid.


8/6: Hb stable. Afebrile. Weak. Calcium increased. Shortness of breath is 

improving. Plan for calcitonin today, lasix, and saline


8/5: Hemoglobin abnormally low on repeat has been stable 7.2.  Calcium up to 

10.9.  She is able to work with PT, she is asking to eat.  Social work is been 

having difficulties with both of her daughters completing the financial aspect 

of disability paperwork which is been prolonging her stay over the past 5 

months.  Plan to check PTH and to treat hypercalcemia with 1 L normal saline 40 

mg of IV Lasix and repeat labs in a.m.


8/4: Not wishing to wear her speaking valve. J tube having some difficulties. 

Afebrile. Rolf bedside to aid her. transferred from ICU today


8/3: No overnight events. Calcium 10.7 on AM labs. She is still a bit slow to to

respond, but follows commands well. Does not want speaking valve with me. Pain 

is better controlled in her abdomen. Wound care to see today. Will check liver 

function and phos levels given her calcium elevation.


8/2: Patient seen and examined bedside.  Positive fluid balance in the past 24 

hours.  Patient's chart, labs, images were reviewed and discussed with RN


8/1: No acute events overnight.  Seen and examined at bedside.  Patient had a 

fever 100.2.  Negative fluid balance of 150 cc.  Patient's chart, labs, images 

were reviewed and discussed with RN


7/31: Patient seen and examined at bedside.  No acute events overnight.  

Positive fluid balance 633.  Patient's chart, labs, images were reviewed and 

discussed with RN


7/30: Patient seen and examined bedside.  No acute events overnight.  Patient's 

chart, labs, images were reviewed and discussed with RN


7/28: Patient seen and examined bedside.  Patient appears to be in no obvious 

pain.  All drains have been adequately draining.  Patient continues to be on 

TPN.  Chart labs and imaging was reviewed.  Negative fluid balance of 270 cc


7/27:Patient seen and examined in the ICU.  Patient still requires extensive 

care.  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  Chart, labs, imaging was

reviewed.  UOP with + 500cc balance.


7/25: Patient seen in and examined in the ICU. She is still extremely critically

ill. Appears weak, frail, and pale. Better color today with improved eye contact

and expression. Discussed with RN. Chart reviewed


7/21: Patient seen and examined in the ICU, She is still extremely critically 

ill, Appears extremely weak frail and pale, Discussed with RN, Chart reviewed





Vitals


Vitals





Vital Signs








  Date Time  Temp Pulse Resp B/P (MAP) Pulse Ox O2 Delivery O2 Flow Rate FiO2


 


8/14/20 12:11     98 Nasal Cannula 2.0 


 


8/14/20 11:00 97.0 113 26 96/56 (69)    





 97.0       











Physical Exam


Physical Exam


GENERAL: Resting in bed, NAD


HEENT: Pupils equal, oral cavity dry. OC/Op - Dry


NECK:  Tracheostomy capped


LUNGS: Diminished aeration bases,


HEART:  S1, S2, 


ABDOMEN: Less distended, mild- bowel sounds hypoactive, soft, GJ drain present, 

drainage bag in place with depedent drainage


: [place] in place 


EXTREMITIES: Trace generalized edema, no cyanosis. Yeast in groin area


SKIN: warm touch. No signs of rash.  


NEURO: - Alert and responsive


RUE  PICC site without signs of complications. PIV s clean


EC fistula


General:  Cooperative, mild distress


Heart:  Normal S1, Normal S2, Other


Lungs:  Clear


Abdomen:  Normal bowel sounds, Soft, No tenderness


Extremities:  No clubbing, No cyanosis, Other (Diffuse edema)


Skin:  No breakdown





Labs


LABS





Laboratory Tests








Test


 8/13/20


17:51 8/13/20


22:54 8/14/20


07:22 8/14/20


12:11


 


Glucose (Fingerstick)


 167 mg/dL


(70-99) 150 mg/dL


(70-99) 205 mg/dL


(70-99) 208 mg/dL


(70-99)











Assessment and Plan


Assessmemt and Plan


Problems


Medical Problems:


(1) Acute pancreatitis


Status: Acute  





(2) Cholelithiasis


Status: Acute  








Comment


Review of Relevant


I have reviewed the following items josy (where applicable) has been applied.


Labs





Laboratory Tests








Test


 8/12/20


17:40 8/13/20


00:26 8/13/20


04:30 8/13/20


06:58


 


Glucose (Fingerstick)


 156 mg/dL


(70-99) 157 mg/dL


(70-99) 


 192 mg/dL


(70-99)


 


White Blood Count


 


 


 12.2 x10^3/uL


(4.0-11.0) 





 


Red Blood Count


 


 


 2.58 x10^6/uL


(3.50-5.40) 





 


Hemoglobin


 


 


 7.0 g/dL


(12.0-15.5) 





 


Hematocrit


 


 


 23.4 %


(36.0-47.0) 





 


Mean Corpuscular Volume   91 fL ()  


 


Mean Corpuscular Hemoglobin   27 pg (25-35)  


 


Mean Corpuscular Hemoglobin


Concent 


 


 30 g/dL


(31-37) 





 


Red Cell Distribution Width


 


 


 20.5 %


(11.5-14.5) 





 


Platelet Count


 


 


 394 x10^3/uL


(140-400) 





 


Sodium Level


 


 


 151 mmol/L


(136-145) 





 


Potassium Level


 


 


 4.1 mmol/L


(3.5-5.1) 





 


Chloride Level


 


 


 119 mmol/L


() 





 


Carbon Dioxide Level


 


 


 21 mmol/L


(21-32) 





 


Anion Gap   11 (6-14)  


 


Blood Urea Nitrogen


 


 


 75 mg/dL


(7-20) 





 


Creatinine


 


 


 2.0 mg/dL


(0.6-1.0) 





 


Estimated GFR


(Cockcroft-Gault) 


 


 26.5 


 





 


Glucose Level


 


 


 164 mg/dL


(70-99) 





 


Calcium Level


 


 


 7.0 mg/dL


(8.5-10.1) 





 


Test


 8/13/20


11:14 8/13/20


17:51 8/13/20


22:54 8/14/20


07:22


 


Glucose (Fingerstick)


 144 mg/dL


(70-99) 167 mg/dL


(70-99) 150 mg/dL


(70-99) 205 mg/dL


(70-99)


 


Test


 8/14/20


12:11 


 


 





 


Glucose (Fingerstick)


 208 mg/dL


(70-99) 


 


 











Laboratory Tests








Test


 8/13/20


17:51 8/13/20


22:54 8/14/20


07:22 8/14/20


12:11


 


Glucose (Fingerstick)


 167 mg/dL


(70-99) 150 mg/dL


(70-99) 205 mg/dL


(70-99) 208 mg/dL


(70-99)








Microbiology


8/10/20 Urine Culture - Final, Complete


          


8/10/20 Blood Culture - Preliminary, Resulted


          NO GROWTH AFTER 4 DAYS


7/26/20 Gram Stain - Final, Complete


          


7/26/20 Aerobic Culture - Final, Complete


          


7/21/20 Gram Stain - Final, Complete


          


7/21/20 Aerobic and Anaerobic Culture - Final, Complete


          


7/19/20 Gram Stain Evaluation - Final, Complete


          


7/19/20 Respiratory Culture - Final, Complete


          


7/19/20 Antimicrobic Susceptibility - Final, Complete


          


6/30/20 Gram Stain - Final, Complete


          


6/30/20 Aerobic and Anaerobic Culture - Final, Complete


          


6/30/20 Antimicrobic Susceptibility - Final, Complete


Medications





Current Medications


Sodium Chloride 1,000 ml @  1,000 mls/hr Q1H IV  Last administered on 3/16/20at 

03:00;  Start 3/16/20 at 03:00;  Stop 3/16/20 at 03:59;  Status DC


Ondansetron HCl (Zofran) 4 mg 1X  ONCE IVP  Last administered on 3/16/20at 

03:27;  Start 3/16/20 at 03:00;  Stop 3/16/20 at 03:01;  Status DC


Morphine Sulfate (Morphine Sulfate) 4 mg 1X  ONCE IV ;  Start 3/16/20 at 03:00; 

Stop 3/16/20 at 03:01;  Status Cancel


Ketorolac Tromethamine (Toradol 30mg Vial) 30 mg 1X  ONCE IV  Last administered 

on 3/16/20at 02:54;  Start 3/16/20 at 03:00;  Stop 3/16/20 at 03:01;  Status DC


Fentanyl Citrate (Fentanyl 2ml Vial) 25 mcg 1X  ONCE IVP  Last administered on 

3/16/20at 03:23;  Start 3/16/20 at 03:30;  Stop 3/16/20 at 03:31;  Status DC


Fentanyl Citrate (Fentanyl 2ml Vial) 100 mcg STK-MED ONCE .ROUTE ;  Start 

3/16/20 at 03:18;  Stop 3/16/20 at 03:18;  Status DC


Iohexol (Omnipaque 350 Mg/ml) 90 ml 1X  ONCE IV  Last administered on 3/16/20at 

03:25;  Start 3/16/20 at 03:30;  Stop 3/16/20 at 03:31;  Status DC


Info (CONTRAST GIVEN -- Rx MONITORING) 1 each PRN DAILY  PRN MC SEE COMMENTS;  

Start 3/16/20 at 03:30;  Stop 3/18/20 at 03:29;  Status DC


Hydromorphone HCl (Dilaudid) 0.5 mg 1X  ONCE IV  Last administered on 3/16/20at 

03:55;  Start 3/16/20 at 04:30;  Stop 3/16/20 at 04:32;  Status DC


Ondansetron HCl (Zofran) 4 mg PRN Q8HRS  PRN IV NAUSEA/VOMITING 1ST CHOICE;  

Start 3/16/20 at 05:00;  Stop 3/16/20 at 09:27;  Status DC


Morphine Sulfate (Morphine Sulfate) 2 mg PRN Q2HR  PRN IV SEVERE PAIN 7-10 Last 

administered on 3/17/20at 12:26;  Start 3/16/20 at 05:00;  Stop 3/17/20 at 

14:15;  Status DC


Sodium Chloride 1,000 ml @  125 mls/hr Q8H IV  Last administered on 3/16/20at 

20:56;  Start 3/16/20 at 05:00;  Stop 3/17/20 at 04:59;  Status DC


Hydromorphone HCl (Dilaudid) 0.5 mg PRN Q3HRS  PRN IV SEVERE PAIN 7-10 Last 

administered on 3/17/20at 10:06;  Start 3/16/20 at 05:00;  Stop 3/17/20 at 

12:01;  Status DC


Piperacillin Sod/ Tazobactam Sod 4.5 gm/Sodium Chloride 100 ml @  200 mls/hr 1X 

ONCE IV  Last administered on 3/16/20at 05:44;  Start 3/16/20 at 06:00;  Stop 

3/16/20 at 06:29;  Status DC


Ondansetron HCl (Zofran) 4 mg PRN Q4HRS  PRN IV NAUSEA/VOMITING 1ST CHOICE Last 

administered on 8/12/20at 12:43;  Start 3/16/20 at 09:30


Insulin Human Lispro (HumaLOG) 0-9 UNITS Q6HRS SQ  Last administered on 

8/14/20at 12:43;  Start 3/16/20 at 09:30


Dextrose (Dextrose 50%-Water Syringe) 12.5 gm PRN Q15MIN  PRN IV SEE COMMENTS;  

Start 3/16/20 at 09:30


Pantoprazole Sodium (PROTONIX VIAL for IV PUSH) 40 mg DAILYAC IVP  Last 

administered on 8/14/20at 08:40;  Start 3/16/20 at 11:30


Prochlorperazine Edisylate (Compazine) 10 mg PRN Q6HRS  PRN IV NAUSEA/VOMITING, 

2nd CHOICE Last administered on 8/10/20at 00:42;  Start 3/16/20 at 17:45


Atenolol (Tenormin) 100 mg DAILY PO ;  Start 3/17/20 at 09:00;  Stop 3/16/20 at 

20:08;  Status DC


Metoprolol Tartrate (Lopressor Vial) 2.5 mg Q6HRS IVP  Last administered on 

3/17/20at 05:51;  Start 3/16/20 at 20:15;  Stop 3/17/20 at 10:02;  Status DC


Metoprolol Tartrate (Lopressor Vial) 5 mg Q6HRS IVP  Last administered on 

3/26/20at 00:12;  Start 3/17/20 at 10:15;  Stop 3/28/20 at 08:48;  Status DC


Hydromorphone HCl (Dilaudid) 1 mg PRN Q3HRS  PRN IV SEVERE PAIN 7-10 Last 

administered on 3/23/20at 05:13;  Start 3/17/20 at 12:00;  Stop 3/31/20 at 

00:25;  Status DC


Lidocaine HCl (Buffered Lidocaine 1%) 3 ml STK-MED ONCE .ROUTE ;  Start 3/17/20 

at 12:55;  Stop 3/17/20 at 12:56;  Status DC


Albumin Human 500 ml @  125 mls/hr 1X  ONCE IV  Last administered on 3/17/20at 

14:33;  Start 3/17/20 at 14:30;  Stop 3/17/20 at 18:32;  Status DC


Norepinephrine Bitartrate 8 mg/ Dextrose 258 ml @  17.299 mls/ hr CONT  PRN IV 

PER PROTOCOL Last administered on 4/14/20at 12:48;  Start 3/17/20 at 15:30;  

Stop 4/17/20 at 09:19;  Status DC


Sodium Chloride 1,000 ml @  125 mls/hr Q8H IV  Last administered on 3/17/20at 

21:04;  Start 3/17/20 at 16:00;  Stop 3/18/20 at 02:42;  Status DC


Albumin Human 500 ml @  125 mls/hr PRN BID  PRN IV After every 2L NSS & BP < 

90mm Last administered on 6/30/20at 16:06;  Start 3/17/20 at 16:00;  Stop 7/3/20

at 09:30;  Status DC


Iohexol (Omnipaque 300 Mg/ml) 60 ml 1X  ONCE IV  Last administered on 3/17/20at 

17:20;  Start 3/17/20 at 17:00;  Stop 3/17/20 at 17:01;  Status DC


Info (CONTRAST GIVEN -- Rx MONITORING) 1 each PRN DAILY  PRN MC SEE COMMENTS;  

Start 3/17/20 at 17:00;  Stop 3/19/20 at 16:59;  Status DC


Meropenem 1 gm/ Sodium Chloride 100 ml @  200 mls/hr Q8HRS IV  Last administered

on 3/18/20at 05:45;  Start 3/17/20 at 20:00;  Stop 3/18/20 at 08:48;  Status DC


Furosemide (Lasix) 40 mg 1X  ONCE IVP  Last administered on 3/17/20at 22:12;  

Start 3/17/20 at 22:30;  Stop 3/17/20 at 22:31;  Status DC


Calcium Chloride 1000 mg/Sodium Chloride 110 ml @  220 mls/hr 1X  ONCE IV  Last 

administered on 3/17/20at 22:11;  Start 3/17/20 at 22:30;  Stop 3/17/20 at 

22:59;  Status DC


Albuterol Sulfate (Ventolin Neb Soln) 2.5 mg 1X  ONCE NEB  Last administered on 

3/18/20at 00:56;  Start 3/17/20 at 22:30;  Stop 3/17/20 at 22:31;  Status DC


Insulin Human Regular (HumuLIN R VIAL) 5 unit 1X  ONCE IV  Last administered on 

3/17/20at 22:14;  Start 3/17/20 at 22:30;  Stop 3/17/20 at 22:31;  Status DC


Magnesium Sulfate 50 ml @ 25 mls/hr 1X  ONCE IV  Last administered on 3/18/20at 

02:57;  Start 3/18/20 at 03:00;  Stop 3/18/20 at 04:59;  Status DC


Calcium Gluconate 1000 mg/Sodium Chloride 110 ml @  220 mls/hr 1X  ONCE IV  Last

administered on 3/18/20at 02:46;  Start 3/18/20 at 03:00;  Stop 3/18/20 at 

03:29;  Status DC


Sodium Chloride 1,000 ml @  200 mls/hr Q5H IV  Last administered on 3/18/20at 

02:46;  Start 3/18/20 at 03:00;  Stop 3/18/20 at 10:21;  Status DC


Calcium Gluconate 1000 mg/Sodium Chloride 110 ml @  220 mls/hr 1X  ONCE IV  Last

administered on 3/18/20at 03:21;  Start 3/18/20 at 03:30;  Stop 3/18/20 at 

03:59;  Status DC


Sodium Bicarbonate 50 meq/Sodium Chloride 1,050 ml @  75 mls/hr Q14H IV  Last 

administered on 3/22/20at 21:10;  Start 3/18/20 at 07:30;  Stop 3/23/20 at 

10:28;  Status DC


Calcium Gluconate 2000 mg/Sodium Chloride 120 ml @  220 mls/hr 1X  ONCE IV  Last

administered on 3/18/20at 09:05;  Start 3/18/20 at 07:30;  Stop 3/18/20 at 

08:02;  Status DC


Lidocaine HCl (Xylocaine-Mpf 1% 2ml Vial) 2 ml STK-MED ONCE .ROUTE ;  Start 

3/18/20 at 08:47;  Stop 3/18/20 at 08:47;  Status DC


Meropenem 500 mg/ Sodium Chloride 50 ml @  100 mls/hr Q12HR IV  Last 

administered on 3/23/20at 21:01;  Start 3/18/20 at 18:00;  Stop 3/24/20 at 

07:58;  Status DC


Lidocaine HCl (Buffered Lidocaine 1%) 3 ml STK-MED ONCE .ROUTE ;  Start 3/18/20 

at 09:46;  Stop 3/18/20 at 09:46;  Status DC


Lidocaine HCl (Buffered Lidocaine 1%) 6 ml 1X  ONCE INJ  Last administered on 

3/18/20at 10:26;  Start 3/18/20 at 10:15;  Stop 3/18/20 at 10:16;  Status DC


Info (Tpn Per Pharmacy) 1 each PRN DAILY  PRN MC SEE COMMENTS Last administered 

on 7/26/20at 08:31;  Start 3/18/20 at 12:00;  Stop 7/26/20 at 10:41;  Status DC


Sodium Chloride 1,000 ml @  1,000 mls/hr Q1H PRN IV hypotension;  Start 3/18/20 

at 12:07;  Stop 3/18/20 at 18:06;  Status DC


Diphenhydramine HCl (Benadryl) 25 mg 1X PRN  PRN IV ITCHING;  Start 3/18/20 at 

12:15;  Stop 3/19/20 at 12:14;  Status DC


Diphenhydramine HCl (Benadryl) 25 mg 1X PRN  PRN IV ITCHING;  Start 3/18/20 at 

12:15;  Stop 3/19/20 at 12:14;  Status DC


Sodium Chloride 1,000 ml @  400 mls/hr Q2H30M PRN IV PATENCY;  Start 3/18/20 at 

12:07;  Stop 3/19/20 at 00:06;  Status DC


Info (PHARMACY MONITORING -- do not chart) 1 each PRN DAILY  PRN MC SEE 

COMMENTS;  Start 3/18/20 at 12:15;  Stop 3/20/20 at 08:13;  Status DC


Sodium Chloride 90 meq/Calcium Gluconate 10 meq/ Multivitamins 10 ml/Chromium/ 

Copper/Manganese/ Seleni/Zn 1 ml/ Total Parenteral Nutrition/Amino 

Acids/Dextrose/ Fat Emulsion Intravenous 55.005 ml  @ 2.292 mls/hr TPN  CONT IV 

;  Start 3/18/20 at 22:00;  Stop 3/18/20 at 12:33;  Status DC


Info (Tpn Per Pharmacy) 1 each PRN DAILY  PRN MC SEE COMMENTS;  Start 3/18/20 at

12:30;  Status UNV


Sodium Chloride 90 meq/Calcium Gluconate 10 meq/ Multivitamins 10 ml/Chromium/ 

Copper/Manganese/ Seleni/Zn 0.5 ml/ Total Parenteral Nutrition/Amino 

Acids/Dextrose/ Fat Emulsion Intravenous 1,512 ml @  63 mls/hr TPN  CONT IV  

Last administered on 3/18/20at 22:06;  Start 3/18/20 at 22:00;  Stop 3/19/20 at 

21:59;  Status DC


Calcium Carbonate/ Glycine (Tums) 500 mg PRN AFTMEALHC  PRN PO INDIGESTION;  

Start 3/18/20 at 17:45;  Stop 5/13/20 at 10:25;  Status DC


Calcium Gluconate (Calcium Gluconate) 2,000 mg 1X  ONCE IVP  Last administered 

on 3/19/20at 02:19;  Start 3/19/20 at 02:15;  Stop 3/19/20 at 02:16;  Status DC


Calcium Chloride 3000 mg/Sodium Chloride 1,030 ml @  50 mls/hr [id number] IV  Last 

administered on 3/21/20at 02:17;  Start 3/19/20 at 08:00;  Stop 3/21/20 at 

15:23;  Status DC


Lorazepam (Ativan Inj) 1 mg PRN Q4HRS  PRN IVP ANXIETY / AGITATION, 2nd choic 

Last administered on 4/17/20at 03:51;  Start 3/19/20 at 09:00;  Stop 4/17/20 at 

09:19;  Status DC


Sodium Chloride 1,000 ml @  1,000 mls/hr Q1H PRN IV hypotension;  Start 3/19/20 

at 08:56;  Stop 3/19/20 at 14:55;  Status DC


Albumin Human 200 ml @  200 mls/hr 1X PRN  PRN IV Hypotension;  Start 3/19/20 at

09:00;  Stop 3/19/20 at 14:59;  Status DC


Diphenhydramine HCl (Benadryl) 25 mg 1X PRN  PRN IV ITCHING;  Start 3/19/20 at 

09:00;  Stop 3/20/20 at 08:59;  Status DC


Diphenhydramine HCl (Benadryl) 25 mg 1X PRN  PRN IV ITCHING;  Start 3/19/20 at 

09:00;  Stop 3/20/20 at 08:59;  Status DC


Sodium Chloride 1,000 ml @  400 mls/hr Q2H30M PRN IV PATENCY;  Start 3/19/20 at 

08:56;  Stop 3/19/20 at 20:55;  Status DC


Info (PHARMACY MONITORING -- do not chart) 1 each PRN DAILY  PRN MC SEE 

COMMENTS;  Start 3/19/20 at 09:00;  Status UNV


Info (PHARMACY MONITORING -- do not chart) 1 each PRN DAILY  PRN MC SEE 

COMMENTS;  Start 3/19/20 at 09:00;  Stop 3/20/20 at 08:13;  Status DC


Digoxin (Lanoxin) 500 mcg 1X  ONCE IV  Last administered on 3/19/20at 10:04;  

Start 3/19/20 at 10:00;  Stop 3/19/20 at 10:01;  Status DC


Digoxin (Lanoxin) 125 mcg 1X  ONCE IV  Last administered on 3/19/20at 17:10;  

Start 3/19/20 at 18:00;  Stop 3/19/20 at 18:01;  Status DC


Magnesium Sulfate 100 ml @  25 mls/hr 1X  ONCE IV  Last administered on 

3/19/20at 12:48;  Start 3/19/20 at 13:00;  Stop 3/19/20 at 16:59;  Status DC


Sodium Chloride 90 meq/Magnesium Sulfate 10 meq/ Calcium Gluconate 20 meq/ 

Multivitamins 10 ml/Chromium/ Copper/Manganese/ Seleni/Zn 0.5 ml/ Total 

Parenteral Nutrition/Amino Acids/Dextrose/ Fat Emulsion Intravenous 1,512 ml @  

63 mls/hr TPN  CONT IV  Last administered on 3/19/20at 22:25;  Start 3/19/20 at 

22:00;  Stop 3/20/20 at 21:59;  Status DC


Sodium Chloride 1,000 ml @  1,000 mls/hr Q1H PRN IV hypotension;  Start 3/20/20 

at 08:05;  Stop 3/20/20 at 14:04;  Status DC


Albumin Human 200 ml @  200 mls/hr 1X  ONCE IV  Last administered on 3/20/20at 

08:57;  Start 3/20/20 at 08:15;  Stop 3/20/20 at 09:14;  Status DC


Diphenhydramine HCl (Benadryl) 25 mg 1X PRN  PRN IV ITCHING;  Start 3/20/20 at 

08:15;  Stop 3/21/20 at 08:14;  Status DC


Diphenhydramine HCl (Benadryl) 25 mg 1X PRN  PRN IV ITCHING;  Start 3/20/20 at 

08:15;  Stop 3/21/20 at 08:14;  Status DC


Sodium Chloride 1,000 ml @  400 mls/hr Q2H30M PRN IV PATENCY;  Start 3/20/20 at 

08:05;  Stop 3/20/20 at 20:04;  Status DC


Info (PHARMACY MONITORING -- do not chart) 1 each PRN DAILY  PRN MC SEE 

COMMENTS;  Start 3/20/20 at 08:15;  Stop 3/24/20 at 07:57;  Status DC


Sodium Chloride 90 meq/Potassium Chloride 15 meq/ Potassium Phosphate 10 mmol/ 

Magnesium Sulfate 10 meq/Calcium Gluconate 20 meq/ Multivitamins 10 ml/Chromium/

Copper/Manganese/ Seleni/Zn 0.5 ml/ Total Parenteral Nutrition/Amino 

Acids/Dextrose/ Fat Emulsion Intravenous 1,512 ml @  63 mls/hr TPN  CONT IV  

Last administered on 3/20/20at 21:01;  Start 3/20/20 at 22:00;  Stop 3/21/20 at 

21:59;  Status DC


Potassium Chloride/Water 100 ml @  100 mls/hr 1X  ONCE IV  Last administered on 

3/20/20at 14:09;  Start 3/20/20 at 14:00;  Stop 3/20/20 at 14:59;  Status DC


Benzocaine (Hurricaine One) 1 spray 1X  ONCE MM  Last administered on 3/20/20at 

16:38;  Start 3/20/20 at 14:30;  Stop 3/20/20 at 14:31;  Status DC


Lidocaine HCl (Glydo (Lidocaine) Jelly) 1 thomas 1X  ONCE MM  Last administered on 

3/20/20at 16:38;  Start 3/20/20 at 14:30;  Stop 3/20/20 at 14:31;  Status DC


Linezolid/Dextrose 300 ml @  300 mls/hr Q12HR IV  Last administered on 3/26/20at

21:04;  Start 3/20/20 at 20:00;  Stop 3/27/20 at 07:50;  Status DC


Acetaminophen (Tylenol) 650 mg PRN Q6HRS  PRN PO MILD PAIN / TEMP;  Start 

3/21/20 at 03:30;  Stop 3/21/20 at 03:36;  Status DC


Acetaminophen (Tylenol) 650 mg PRN Q6HRS  PRN PEG MILD PAIN / TEMP Last 

administered on 4/16/20at 19:56;  Start 3/21/20 at 03:36;  Stop 5/13/20 at 1

0:25;  Status DC


Sodium Chloride 1,000 ml @  1,000 mls/hr Q1H PRN IV hypotension;  Start 3/21/20 

at 07:50;  Stop 3/21/20 at 13:49;  Status DC


Albumin Human 200 ml @  200 mls/hr 1X PRN  PRN IV Hypotension;  Start 3/21/20 at

08:00;  Stop 3/21/20 at 13:59;  Status DC


Sodium Chloride (Normal Saline Flush) 10 ml 1X PRN  PRN IV AP catheter pack;  

Start 3/21/20 at 08:00;  Stop 3/22/20 at 07:59;  Status DC


Sodium Chloride (Normal Saline Flush) 10 ml 1X PRN  PRN IV  catheter pack;  

Start 3/21/20 at 08:00;  Stop 3/22/20 at 07:59;  Status DC


Sodium Chloride 1,000 ml @  400 mls/hr Q2H30M PRN IV PATENCY;  Start 3/21/20 at 

07:50;  Stop 3/21/20 at 19:49;  Status DC


Info (PHARMACY MONITORING -- do not chart) 1 each PRN DAILY  PRN MC SEE 

COMMENTS;  Start 3/21/20 at 08:00;  Status UNV


Info (PHARMACY MONITORING -- do not chart) 1 each PRN DAILY  PRN MC SEE 

COMMENTS;  Start 3/21/20 at 08:00;  Stop 3/23/20 at 08:25;  Status DC


Sodium Chloride 90 meq/Potassium Chloride 15 meq/ Potassium Phosphate 10 mmol/ 

Magnesium Sulfate 10 meq/Calcium Gluconate 20 meq/ Multivitamins 10 ml/Chromium/

Copper/Manganese/ Seleni/Zn 0.5 ml/ Total Parenteral Nutrition/Amino A

cids/Dextrose/ Fat Emulsion Intravenous 1,512 ml @  63 mls/hr TPN  CONT IV  Last

administered on 3/21/20at 20:57;  Start 3/21/20 at 22:00;  Stop 3/22/20 at 

21:59;  Status DC


Sodium Chloride 90 meq/Potassium Chloride 15 meq/ Potassium Phosphate 15 mmol/ 

Magnesium Sulfate 10 meq/Calcium Gluconate 20 meq/ Multivitamins 10 ml/Chromium/

Copper/Manganese/ Seleni/Zn 0.5 ml/ Total Parenteral Nutrition/Amino 

Acids/Dextrose/ Fat Emulsion Intravenous 1,512 ml @  63 mls/hr TPN  CONT IV ;  

Start 3/22/20 at 22:00;  Stop 3/22/20 at 14:16;  Status DC


Sodium Chloride 90 meq/Potassium Chloride 15 meq/ Potassium Phosphate 15 mmol/ 

Magnesium Sulfate 10 meq/Calcium Gluconate 20 meq/ Multivitamins 10 ml/Chromium/

Copper/Manganese/ Seleni/Zn 0.5 ml/ Total Parenteral Nutrition/Amino 

Acids/Dextrose/ Fat Emulsion Intravenous 1,200 ml @  50 mls/hr TPN  CONT IV ;  

Start 3/22/20 at 22:00;  Stop 3/22/20 at 14:17;  Status DC


Sodium Chloride 90 meq/Potassium Chloride 15 meq/ Potassium Phosphate 10 mmol/ 

Magnesium Sulfate 10 meq/Calcium Gluconate 20 meq/ Multivitamins 10 ml/Chromium/

Copper/Manganese/ Seleni/Zn 0.5 ml/ Total Parenteral Nutrition/Amino 

Acids/Dextrose/ Fat Emulsion Intravenous 1,200 ml @  50 mls/hr TPN  CONT IV  

Last administered on 3/22/20at 23:29;  Start 3/22/20 at 22:00;  Stop 3/23/20 at 

21:59;  Status DC


Sodium Chloride 1,000 ml @  1,000 mls/hr Q1H PRN IV hypotension;  Start 3/23/20 

at 07:28;  Stop 3/23/20 at 13:27;  Status DC


Albumin Human 200 ml @  200 mls/hr 1X  ONCE IV  Last administered on 3/23/20at 

08:51;  Start 3/23/20 at 07:30;  Stop 3/23/20 at 08:29;  Status DC


Diphenhydramine HCl (Benadryl) 25 mg 1X PRN  PRN IV ITCHING;  Start 3/23/20 at 

07:30;  Stop 3/24/20 at 07:29;  Status DC


Diphenhydramine HCl (Benadryl) 25 mg 1X PRN  PRN IV ITCHING;  Start 3/23/20 at 

07:30;  Stop 3/24/20 at 07:29;  Status DC


Sodium Chloride 1,000 ml @  400 mls/hr Q2H30M PRN IV PATENCY;  Start 3/23/20 at 

07:28;  Stop 3/23/20 at 19:27;  Status DC


Info (PHARMACY MONITORING -- do not chart) 1 each PRN DAILY  PRN MC SEE 

COMMENTS;  Start 3/23/20 at 07:30;  Stop 4/3/20 at 13:01;  Status DC


Metronidazole 100 ml @  100 mls/hr Q6HRS IV  Last administered on 4/8/20at 

06:26;  Start 3/23/20 at 08:30;  Stop 4/8/20 at 09:58;  Status DC


Micafungin Sodium 100 mg/Dextrose 100 ml @  100 mls/hr Q24H IV  Last admini

stered on 4/30/20at 08:18;  Start 3/23/20 at 09:00;  Stop 4/30/20 at 20:58;  

Status DC


Propofol 0 ml @ As Directed STK-MED ONCE IV ;  Start 3/23/20 at 07:53;  Stop 

3/23/20 at 07:53;  Status DC


Etomidate (Amidate) 20 mg STK-MED ONCE IV ;  Start 3/23/20 at 07:53;  Stop 

3/23/20 at 07:54;  Status DC


Midazolam HCl (Versed) 5 mg STK-MED ONCE .ROUTE ;  Start 3/23/20 at 07:57;  Stop

3/23/20 at 07:57;  Status DC


Fentanyl Citrate 30 ml @ 0 mls/hr CONT  PRN IV SEE PROTOCOL Last administered on

4/17/20at 06:12;  Start 3/23/20 at 08:15;  Stop 4/17/20 at 09:19;  Status DC


Artificial Tears (Artificial Tears) 1 drop PRN Q1HR  PRN OU DRY EYE, 1st choice;

 Start 3/23/20 at 08:15;  Stop 4/29/20 at 05:31;  Status DC


Midazolam HCl 50 mg/Sodium Chloride 50 ml @ 0 mls/hr CONT  PRN IV SEE PROTOCOL 

Last administered on 3/26/20at 22:39;  Start 3/23/20 at 08:15;  Stop 3/28/20 at 

15:59;  Status DC


Etomidate (Amidate) 8 mg 1X  ONCE IV  Last administered on 3/23/20at 08:33;  

Start 3/23/20 at 08:30;  Stop 3/23/20 at 08:31;  Status DC


Succinylcholine Chloride (Anectine) 120 mg 1X  ONCE IV  Last administered on 

3/23/20at 08:34;  Start 3/23/20 at 08:30;  Stop 3/23/20 at 08:31;  Status DC


Midazolam HCl (Versed) 5 mg 1X  ONCE IV ;  Start 3/23/20 at 08:30;  Stop 3/23/20

at 08:31;  Status DC


Potassium Chloride 15 meq/ Bicarbonate Dialysis Soln w/ out KCl 5,007.5 ml  @ 

1,000 mls/ hr Q5H1M IV  Last administered on 3/24/20at 11:11;  Start 3/23/20 at 

12:00;  Stop 3/24/20 at 11:15;  Status DC


Potassium Chloride 15 meq/ Bicarbonate Dialysis Soln w/ out KCl 5,007.5 ml  @ 

1,000 mls/ hr Q5H1M IV  Last administered on 3/24/20at 11:12;  Start 3/23/20 at 

12:00;  Stop 3/24/20 at 11:17;  Status DC


Potassium Chloride 15 meq/ Bicarbonate Dialysis Soln w/ out KCl 5,007.5 ml  @ 

1,000 mls/ hr Q5H1M IV  Last administered on 3/24/20at 11:11;  Start 3/23/20 at 

12:00;  Stop 3/24/20 at 11:19;  Status DC


Sodium Chloride 90 meq/Potassium Chloride 15 meq/ Potassium Phosphate 10 mmol/ 

Magnesium Sulfate 10 meq/Calcium Gluconate 20 meq/ Multivitamins 10 ml/Chromium/

Copper/Manganese/ Seleni/Zn 0.5 ml/ Total Parenteral Nutrition/Amino 

Acids/Dextrose/ Fat Emulsion Intravenous 1,400 ml @  58.333 mls/ hr TPN  CONT IV

 Last administered on 3/23/20at 21:42;  Start 3/23/20 at 22:00;  Stop 3/24/20 at

21:59;  Status DC


Heparin Sodium (Porcine) (Heparin Sodium) 5,000 unit Q8HRS SQ  Last administered

on 3/28/20at 05:55;  Start 3/23/20 at 15:00;  Stop 3/28/20 at 13:28;  Status DC


Meropenem 500 mg/ Sodium Chloride 50 ml @  100 mls/hr Q6HRS IV  Last 

administered on 3/25/20at 06:00;  Start 3/24/20 at 09:00;  Stop 3/25/20 at 

07:29;  Status DC


Potassium Phosphate 20 mmol/ Sodium Chloride 106.6667 ml @  51.667 m... 1X  ONCE

IV  Last administered on 3/24/20at 11:22;  Start 3/24/20 at 10:15;  Stop 3/24/20

at 12:18;  Status DC


Acetaminophen (Tylenol Supp) 650 mg PRN Q6HRS  PRN TX MILD PAIN / TEMP > 100.3'F

Last administered on 8/10/20at 15:43;  Start 3/24/20 at 10:30


Potassium Chloride/Water 100 ml @  100 mls/hr Q1H IV  Last administered on 

3/24/20at 12:12;  Start 3/24/20 at 11:00;  Stop 3/24/20 at 12:59;  Status DC


Potassium Chloride 20 meq/ Bicarbonate Dialysis Soln w/ out KCl 5,010 ml @  

1,000 mls/hr Q5H1M IV  Last administered on 3/25/20at 08:48;  Start 3/24/20 at 

12:00;  Stop 3/25/20 at 13:03;  Status DC


Potassium Chloride 20 meq/ Bicarbonate Dialysis Soln w/ out KCl 5,010 ml @  

1,000 mls/hr Q5H1M IV  Last administered on 3/29/20at 14:52;  Start 3/24/20 at 

11:30;  Stop 3/29/20 at 19:59;  Status DC


Potassium Chloride 20 meq/ Bicarbonate Dialysis Soln w/ out KCl 5,010 ml @  

1,000 mls/hr Q5H1M IV  Last administered on 3/29/20at 14:53;  Start 3/24/20 at 

11:30;  Stop 3/29/20 at 19:59;  Status DC


Sodium Chloride 90 meq/Potassium Chloride 15 meq/ Potassium Phosphate 15 mmol/ 

Magnesium Sulfate 10 meq/Calcium Gluconate 15 meq/ Multivitamins 10 ml/Chromium/

Copper/Manganese/ Seleni/Zn 0.5 ml/ Total Parenteral Nutrition/Amino 

Acids/Dextrose/ Fat Emulsion Intravenous 1,400 ml @  58.333 mls/ hr TPN  CONT IV

 Last administered on 3/24/20at 22:17;  Start 3/24/20 at 22:00;  Stop 3/25/20 at

21:59;  Status DC


Cefepime HCl (Maxipime) 2 gm Q12HR IVP  Last administered on 4/7/20at 20:56;  

Start 3/25/20 at 09:00;  Stop 4/8/20 at 09:58;  Status DC


Daptomycin 500 mg/ Sodium Chloride 50 ml @  100 mls/hr Q48H IV  Last 

administered on 4/10/20at 09:57;  Start 3/25/20 at 08:30;  Stop 4/10/20 at 

10:07;  Status DC


Lidocaine HCl (Buffered Lidocaine 1%) 3 ml 1X  ONCE INJ  Last administered on 

3/25/20at 10:27;  Start 3/25/20 at 10:30;  Stop 3/25/20 at 10:31;  Status DC


Potassium Phosphate 20 mmol/ Sodium Chloride 106.6667 ml @  51.667 m... 1X  ONCE

IV  Last administered on 3/25/20at 12:51;  Start 3/25/20 at 13:00;  Stop 3/25/20

at 15:03;  Status DC


Sodium Chloride 90 meq/Potassium Chloride 15 meq/ Potassium Phosphate 18 mmol/ 

Magnesium Sulfate 8 meq/Calcium Gluconate 15 meq/ Multivitamins 10 ml/Chromium/ 

Copper/Manganese/ Seleni/Zn 0.5 ml/ Total Parenteral Nutrition/Amino 

Acids/Dextrose/ Fat Emulsion Intravenous 1,400 ml @  58.333 mls/ hr TPN  CONT IV

 Last administered on 3/25/20at 22:16;  Start 3/25/20 at 22:00;  Stop 3/26/20 at

21:59;  Status DC


Potassium Chloride 20 meq/ Bicarbonate Dialysis Soln w/ out KCl 5,010 ml @  

1,000 mls/hr Q5H1M IV  Last administered on 3/29/20at 14:54;  Start 3/25/20 at 

16:00;  Stop 3/29/20 at 19:59;  Status DC


Multi-Ingred Cream/Lotion/Oil/ Oint (Artificial Tears Eye Ointment) 1 thomas PRN 

Q1HR  PRN OU DRY EYE, 2nd choice Last administered on 4/13/20at 08:19;  Start 

3/25/20 at 17:30;  Stop 6/3/20 at 14:39;  Status DC


Sodium Chloride 90 meq/Potassium Chloride 15 meq/ Potassium Phosphate 18 mmol/ 

Magnesium Sulfate 8 meq/Calcium Gluconate 15 meq/ Multivitamins 10 ml/Chromium/ 

Copper/Manganese/ Seleni/Zn 0.5 ml/ Total Parenteral Nutrition/Amino 

Acids/Dextrose/ Fat Emulsion Intravenous 1,400 ml @  58.333 mls/ hr TPN  CONT IV

 Last administered on 3/26/20at 22:00;  Start 3/26/20 at 22:00;  Stop 3/27/20 at

21:59;  Status DC


Albumin Human 500 ml @  125 mls/hr 1X  ONCE IV ;  Start 3/26/20 at 14:15;  Stop 

3/26/20 at 18:14;  Status DC


Sodium Chloride 90 meq/Potassium Chloride 15 meq/ Potassium Phosphate 18 mmol/ 

Magnesium Sulfate 8 meq/Calcium Gluconate 15 meq/ Multivitamins 10 ml/Chromium/ 

Copper/Manganese/ Seleni/Zn 0.5 ml/ Insulin Human Regular 10 unit/ Total 

Parenteral Nutrition/Amino Acids/Dextrose/ Fat Emulsion Intravenous 1,400 ml @  

58.333 mls/ hr TPN  CONT IV  Last administered on 3/27/20at 21:43;  Start 

3/27/20 at 22:00;  Stop 3/28/20 at 21:59;  Status DC


Lidocaine HCl (Buffered Lidocaine 1%) 3 ml STK-MED ONCE .ROUTE ;  Start 3/25/20 

at 10:00;  Stop 3/27/20 at 13:57;  Status DC


Midazolam HCl 100 mg/Sodium Chloride 100 ml @ 7 mls/hr CONT  PRN IV SEE PROTOCOL

Last administered on 4/8/20at 15:35;  Start 3/28/20 at 16:00;  Stop 6/3/20 at 

14:38;  Status DC


Sodium Chloride 90 meq/Potassium Chloride 15 meq/ Potassium Phosphate 18 mmol/ 

Magnesium Sulfate 8 meq/Calcium Gluconate 15 meq/ Multivitamins 10 ml/Chromium/ 

Copper/Manganese/ Seleni/Zn 0.5 ml/ Insulin Human Regular 15 unit/ Total 

Parenteral Nutrition/Amino Acids/Dextrose/ Fat Emulsion Intravenous 1,400 ml @  

58.333 mls/ hr TPN  CONT IV  Last administered on 3/28/20at 20:34;  Start 

3/28/20 at 22:00;  Stop 3/29/20 at 21:59;  Status DC


Info ([facility] Electrolyte Protocol) 1 ea CONT PRN  PRN MC PER PROTOCOL;  Start 

3/29/20 at 13:15


Sodium Chloride 90 meq/Potassium Chloride 15 meq/ Potassium Phosphate 18 mmol/ 

Magnesium Sulfate 8 meq/Calcium Gluconate 15 meq/ Multivitamins 10 ml/Chromium/ 

Copper/Manganese/ Seleni/Zn 0.5 ml/ Insulin Human Regular 15 unit/ Total 

Parenteral Nutrition/Amino Acids/Dextrose/ Fat Emulsion Intravenous 1,400 ml @  

58.333 mls/ hr TPN  CONT IV  Last administered on 3/29/20at 22:05;  Start 

3/29/20 at 22:00;  Stop 3/30/20 at 21:59;  Status DC


Potassium Chloride 15 meq/ Bicarbonate Dialysis Soln w/ out KCl 5,007.5 ml  @ 

1,000 mls/ hr Q5H1M IV  Last administered on 4/1/20at 18:14;  Start 3/29/20 at 

20:00;  Stop 4/2/20 at 13:08;  Status DC


Potassium Chloride 15 meq/ Bicarbonate Dialysis Soln w/ out KCl 5,007.5 ml  @ 

1,000 mls/ hr Q5H1M IV  Last administered on 4/1/20at 18:14;  Start 3/29/20 at 

20:00;  Stop 4/2/20 at 13:08;  Status DC


Potassium Chloride 15 meq/ Bicarbonate Dialysis Soln w/ out KCl 5,007.5 ml  @ 

1,000 mls/ hr Q5H1M IV  Last administered on 4/1/20at 18:14;  Start 3/29/20 at 

20:00;  Stop 4/2/20 at 13:08;  Status DC


Iohexol (Omnipaque 240 Mg/ml) 30 ml 1X  ONCE PO  Last administered on 3/30/20at 

11:30;  Start 3/30/20 at 11:30;  Stop 3/30/20 at 11:33;  Status DC


Info (CONTRAST GIVEN -- Rx MONITORING) 1 each PRN DAILY  PRN MC SEE COMMENTS;  

Start 3/30/20 at 11:45;  Stop 4/1/20 at 11:44;  Status DC


Sodium Chloride 90 meq/Potassium Chloride 15 meq/ Potassium Phosphate 18 mmol/ 

Magnesium Sulfate 8 meq/Calcium Gluconate 15 meq/ Multivitamins 10 ml/Chromium/ 

Copper/Manganese/ Seleni/Zn 0.5 ml/ Insulin Human Regular 15 unit/ Total 

Parenteral Nutrition/Amino Acids/Dextrose/ Fat Emulsion Intravenous 1,400 ml @  

58.333 mls/ hr TPN  CONT IV  Last administered on 3/30/20at 21:47;  Start 

3/30/20 at 22:00;  Stop 3/31/20 at 21:59;  Status DC


Sodium Chloride 90 meq/Potassium Chloride 15 meq/ Potassium Phosphate 18 mmol/ 

Magnesium Sulfate 8 meq/Calcium Gluconate 15 meq/ Multivitamins 10 ml/Chromium/ 

Copper/Manganese/ Seleni/Zn 0.5 ml/ Insulin Human Regular 20 unit/ Total 

Parenteral Nutrition/Amino Acids/Dextrose/ Fat Emulsion Intravenous 1,400 ml @  

58.333 mls/ hr TPN  CONT IV  Last administered on 3/31/20at 21:36;  Start 

3/31/20 at 22:00;  Stop 4/1/20 at 21:59;  Status DC


Alteplase, Recombinant (Cathflo For Central Catheter Clearance) 1 mg 1X  ONCE 

INT CAT  Last administered on 3/31/20at 20:03;  Start 3/31/20 at 19:30;  Stop 

3/31/20 at 19:46;  Status DC


Alteplase, Recombinant (Cathflo For Central Catheter Clearance) 1 mg 1X  ONCE 

INT CAT  Last administered on 3/31/20at 22:05;  Start 3/31/20 at 22:00;  Stop 

3/31/20 at 22:01;  Status DC


Sodium Chloride 90 meq/Potassium Chloride 15 meq/ Potassium Phosphate 18 mmol/ 

Magnesium Sulfate 8 meq/Calcium Gluconate 15 meq/ Multivitamins 10 ml/Chromium/ 

Copper/Manganese/ Seleni/Zn 0.5 ml/ Insulin Human Regular 20 unit/ Total 

Parenteral Nutrition/Amino Acids/Dextrose/ Fat Emulsion Intravenous 1,400 ml @  

58.333 mls/ hr TPN  CONT IV  Last administered on 4/1/20at 21:30;  Start 4/1/20 

at 22:00;  Stop 4/2/20 at 21:59;  Status DC


Dexmedetomidine HCl 400 mcg/ Sodium Chloride 100 ml @ 0 mls/hr CONT  PRN IV 

ANXIETY / AGITATION Last administered on 5/30/20at 12:57;  Start 4/2/20 at 

08:15;  Stop 5/30/20 at 18:31;  Status DC


Sodium Chloride 500 ml @  500 mls/hr 1X PRN  PRN IV ELEVATED BP, SEE COMMENTS;  

Start 4/2/20 at 08:15;  Stop 8/5/20 at 09:08;  Status DC


Atropine Sulfate (ATROPINE 0.5mg SYRINGE) 0.5 mg PRN Q5MIN  PRN IV SEE COMMENTS;

 Start 4/2/20 at 08:15;  Stop 8/3/20 at 09:45;  Status DC


Furosemide (Lasix) 20 mg 1X  ONCE IVP  Last administered on 4/2/20at 08:19;  

Start 4/2/20 at 08:15;  Stop 4/2/20 at 08:16;  Status DC


Lidocaine HCl (Buffered Lidocaine 1%) 3 ml STK-MED ONCE .ROUTE ;  Start 4/2/20 

at 08:39;  Stop 4/2/20 at 08:39;  Status DC


Lidocaine HCl (Buffered Lidocaine 1%) 6 ml 1X  ONCE INJ  Last administered on 

4/2/20at 09:05;  Start 4/2/20 at 09:00;  Stop 4/2/20 at 09:06;  Status DC


Sodium Chloride 90 meq/Potassium Chloride 15 meq/ Potassium Phosphate 18 mmol/ 

Magnesium Sulfate 8 meq/Calcium Gluconate 15 meq/ Multivitamins 10 ml/Chromium/ 

Copper/Manganese/ Seleni/Zn 0.5 ml/ Insulin Human Regular 20 unit/ Total 

Parenteral Nutrition/Amino Acids/Dextrose/ Fat Emulsion Intravenous 1,400 ml @  

58.333 mls/ hr TPN  CONT IV  Last administered on 4/2/20at 22:45;  Start 4/2/20 

at 22:00;  Stop 4/3/20 at 21:59;  Status DC


Sodium Chloride 1,000 ml @  1,000 mls/hr Q1H PRN IV hypotension;  Start 4/3/20 

at 07:30;  Stop 4/3/20 at 13:29;  Status DC


Albumin Human 200 ml @  200 mls/hr 1X PRN  PRN IV Hypotension Last administered 

on 4/3/20at 09:36;  Start 4/3/20 at 07:30;  Stop 4/3/20 at 13:29;  Status DC


Sodium Chloride (Normal Saline Flush) 10 ml 1X PRN  PRN IV AP catheter pack;  

Start 4/3/20 at 07:30;  Stop 4/3/20 at 21:29;  Status DC


Sodium Chloride (Normal Saline Flush) 10 ml 1X PRN  PRN IV  catheter pack;  

Start 4/3/20 at 07:30;  Stop 4/4/20 at 07:29;  Status DC


Sodium Chloride 1,000 ml @  400 mls/hr Q2H30M PRN IV PATENCY;  Start 4/3/20 at 

07:30;  Stop 4/3/20 at 19:29;  Status DC


Info (PHARMACY MONITORING -- do not chart) 1 each PRN DAILY  PRN MC SEE 

COMMENTS;  Start 4/3/20 at 07:30;  Stop 4/3/20 at 13:02;  Status DC


Info (PHARMACY MONITORING -- do not chart) 1 each PRN DAILY  PRN MC SEE 

COMMENTS;  Start 4/3/20 at 07:30;  Stop 4/5/20 at 12:45;  Status DC


Sodium Chloride 90 meq/Potassium Chloride 15 meq/ Potassium Phosphate 10 mmol/ 

Magnesium Sulfate 8 meq/Calcium Gluconate 15 meq/ Multivitamins 10 ml/Chromium/ 

Copper/Manganese/ Seleni/Zn 0.5 ml/ Insulin Human Regular 25 unit/ Total 

Parenteral Nutrition/Amino Acids/Dextrose/ Fat Emulsion Intravenous 1,400 ml @  

58.333 mls/ hr TPN  CONT IV  Last administered on 4/3/20at 22:19;  Start 4/3/20 

at 22:00;  Stop 4/4/20 at 21:59;  Status DC


Heparin Sodium (Porcine) (Heparin Sodium) 5,000 unit Q12HR SQ  Last administered

on 4/26/20at 08:59;  Start 4/3/20 at 21:00;  Stop 4/26/20 at 10:05;  Status DC


Ondansetron HCl (Zofran) 4 mg PRN Q6HRS  PRN IV NAUSEA/VOMITING;  Start 4/6/20 

at 07:00;  Stop 4/7/20 at 06:59;  Status DC


Fentanyl Citrate (Fentanyl 2ml Vial) 25 mcg PRN Q5MIN  PRN IV MILD PAIN 1-3;  

Start 4/6/20 at 07:00;  Stop 4/7/20 at 06:59;  Status DC


Fentanyl Citrate (Fentanyl 2ml Vial) 50 mcg PRN Q5MIN  PRN IV MODERATE TO SEVERE

PAIN;  Start 4/6/20 at 07:00;  Stop 4/7/20 at 06:59;  Status DC


Ringer's Solution 1,000 ml @  30 mls/hr Q24H IV ;  Start 4/6/20 at 07:00;  Stop 

4/6/20 at 18:59;  Status DC


Lidocaine HCl (Xylocaine-Mpf 1% 2ml Vial) 2 ml PRN 1X  PRN ID PRIOR TO IV START;

 Start 4/6/20 at 07:00;  Stop 4/7/20 at 06:59;  Status DC


Prochlorperazine Edisylate (Compazine) 5 mg PACU PRN  PRN IV NAUSEA, MRX1;  

Start 4/6/20 at 07:00;  Stop 4/7/20 at 06:59;  Status DC


Sodium Chloride 1,000 ml @  1,000 mls/hr Q1H PRN IV hypotension;  Start 4/4/20 

at 09:10;  Stop 4/4/20 at 15:09;  Status DC


Albumin Human 200 ml @  200 mls/hr 1X PRN  PRN IV Hypotension Last administered 

on 4/4/20at 10:10;  Start 4/4/20 at 09:15;  Stop 4/4/20 at 15:14;  Status DC


Sodium Chloride 1,000 ml @  400 mls/hr Q2H30M PRN IV PATENCY;  Start 4/4/20 at 

09:10;  Stop 4/4/20 at 21:09;  Status DC


Info (PHARMACY MONITORING -- do not chart) 1 each PRN DAILY  PRN MC SEE 

COMMENTS;  Start 4/4/20 at 09:15;  Stop 4/5/20 at 12:45;  Status DC


Info (PHARMACY MONITORING -- do not chart) 1 each PRN DAILY  PRN MC SEE 

COMMENTS;  Start 4/4/20 at 09:15;  Stop 4/5/20 at 12:45;  Status DC


Sodium Chloride 90 meq/Potassium Chloride 15 meq/ Potassium Phosphate 10 mmol/ 

Magnesium Sulfate 8 meq/Calcium Gluconate 15 meq/ Multivitamins 10 ml/Chromium/ 

Copper/Manganese/ Seleni/Zn 0.5 ml/ Insulin Human Regular 25 unit/ Total 

Parenteral Nutrition/Amino Acids/Dextrose/ Fat Emulsion Intravenous 1,400 ml @  

58.333 mls/ hr TPN  CONT IV  Last administered on 4/4/20at 22:10;  Start 4/4/20 

at 22:00;  Stop 4/5/20 at 21:59;  Status DC


Magnesium Sulfate 50 ml @ 25 mls/hr PRN DAILY  PRN IV for Mag < 1.7 on am labs 

Last administered on 6/18/20at 10:57;  Start 4/5/20 at 09:15


Sodium Chloride 90 meq/Potassium Chloride 15 meq/ Potassium Phosphate 10 mmol/ 

Magnesium Sulfate 8 meq/Calcium Gluconate 15 meq/ Multivitamins 10 ml/Chromium/ 

Copper/Manganese/ Seleni/Zn 0.5 ml/ Insulin Human Regular 25 unit/ Total 

Parenteral Nutrition/Amino Acids/Dextrose/ Fat Emulsion Intravenous 1,400 ml @  

58.333 mls/ hr TPN  CONT IV  Last administered on 4/5/20at 21:20;  Start 4/5/20 

at 22:00;  Stop 4/6/20 at 21:59;  Status DC


Sodium Chloride 1,000 ml @  1,000 mls/hr Q1H PRN IV hypotension;  Start 4/5/20 

at 12:23;  Stop 4/5/20 at 18:22;  Status DC


Albumin Human 200 ml @  200 mls/hr 1X  ONCE IV  Last administered on 4/5/20at 

13:34;  Start 4/5/20 at 12:30;  Stop 4/5/20 at 13:29;  Status DC


Diphenhydramine HCl (Benadryl) 25 mg 1X PRN  PRN IV ITCHING;  Start 4/5/20 at 

12:30;  Stop 4/6/20 at 12:29;  Status DC


Diphenhydramine HCl (Benadryl) 25 mg 1X PRN  PRN IV ITCHING;  Start 4/5/20 at 

12:30;  Stop 4/6/20 at 12:29;  Status DC


Info (PHARMACY MONITORING -- do not chart) 1 each PRN DAILY  PRN MC SEE 

COMMENTS;  Start 4/5/20 at 12:30;  Status Cancel


Bupivacaine HCl/ Epinephrine Bitart (Sensorcain-Epi 0.5%-1:[id number] Mpf) 30 ml 

STK-MED ONCE .ROUTE  Last administered on 4/6/20at 11:44;  Start 4/6/20 at 

11:00;  Stop 4/6/20 at 11:01;  Status DC


Cellulose (Surgicel Fibrillar 1x2) 1 each STK-MED ONCE .ROUTE ;  Start 4/6/20 at

11:00;  Stop 4/6/20 at 11:01;  Status DC


Sodium Chloride 90 meq/Potassium Chloride 15 meq/ Potassium Phosphate 10 mmol/ 

Magnesium Sulfate 12 meq/Calcium Gluconate 15 meq/ Multivitamins 10 ml/Chromium/

Copper/Manganese/ Seleni/Zn 0.5 ml/ Insulin Human Regular 25 unit/ Total 

Parenteral Nutrition/Amino Acids/Dextrose/ Fat Emulsion Intravenous 1,400 ml @  

58.333 mls/ hr TPN  CONT IV  Last administered on 4/6/20at 22:24;  Start 4/6/20 

at 22:00;  Stop 4/7/20 at 21:59;  Status DC


Propofol 20 ml @ As Directed STK-MED ONCE IV ;  Start 4/6/20 at 11:07;  Stop 

4/6/20 at 11:07;  Status DC


Cellulose (Surgicel Hemostat 4x8) 1 each STK-MED ONCE .ROUTE  Last administered 

on 4/6/20at 11:44;  Start 4/6/20 at 11:55;  Stop 4/6/20 at 11:56;  Status DC


Sevoflurane (Ultane) 60 ml STK-MED ONCE IH ;  Start 4/6/20 at 12:46;  Stop 

4/6/20 at 12:46;  Status DC


Sodium Chloride 1,000 ml @  1,000 mls/hr Q1H PRN IV hypotension;  Start 4/6/20 

at 13:51;  Stop 4/6/20 at 19:50;  Status DC


Albumin Human 200 ml @  200 mls/hr 1X PRN  PRN IV Hypotension Last administered 

on 4/6/20at 14:51;  Start 4/6/20 at 14:00;  Stop 4/6/20 at 19:59;  Status DC


Diphenhydramine HCl (Benadryl) 25 mg 1X PRN  PRN IV ITCHING;  Start 4/6/20 at 

14:00;  Stop 4/7/20 at 13:59;  Status DC


Diphenhydramine HCl (Benadryl) 25 mg 1X PRN  PRN IV ITCHING;  Start 4/6/20 at 

14:00;  Stop 4/7/20 at 13:59;  Status DC


Sodium Chloride 1,000 ml @  400 mls/hr Q2H30M PRN IV PATENCY;  Start 4/6/20 at 

13:51;  Stop 4/7/20 at 01:50;  Status DC


Info (PHARMACY MONITORING -- do not chart) 1 each PRN DAILY  PRN MC SEE 

COMMENTS;  Start 4/6/20 at 14:00;  Stop 4/9/20 at 08:16;  Status DC


Heparin Sodium (Porcine) (Hep Lock Adult) 500 unit STK-MED ONCE IVP ;  Start 

4/7/20 at 09:29;  Stop 4/7/20 at 09:30;  Status DC


Sodium Chloride 1,000 ml @  1,000 mls/hr Q1H PRN IV hypotension;  Start 4/7/20 

at 10:43;  Stop 4/7/20 at 16:42;  Status DC


Sodium Chloride 1,000 ml @  400 mls/hr Q2H30M PRN IV PATENCY;  Start 4/7/20 at 

10:43;  Stop 4/7/20 at 22:42;  Status DC


Info (PHARMACY MONITORING -- do not chart) 1 each PRN DAILY  PRN MC SEE 

COMMENTS;  Start 4/7/20 at 10:45;  Status UNV


Info (PHARMACY MONITORING -- do not chart) 1 each PRN DAILY  PRN MC SEE 

COMMENTS;  Start 4/7/20 at 10:45;  Status UNV


Sodium Chloride 90 meq/Potassium Chloride 15 meq/ Magnesium Sulfate 12 

meq/Calcium Gluconate 15 meq/ Multivitamins 10 ml/Chromium/ Copper/Manganese/ 

Seleni/Zn 0.5 ml/ Insulin Human Regular 25 unit/ Total Parenteral 

Nutrition/Amino Acids/Dextrose/ Fat Emulsion Intravenous 1,400 ml @  58.333 mls/

hr TPN  CONT IV  Last administered on 4/7/20at 22:13;  Start 4/7/20 at 22:00;  

Stop 4/8/20 at 21:59;  Status DC


Sodium Chloride 1,000 ml @  1,000 mls/hr Q1H PRN IV hypotension;  Start 4/8/20 

at 07:50;  Stop 4/8/20 at 13:49;  Status DC


Albumin Human 200 ml @  200 mls/hr 1X  ONCE IV ;  Start 4/8/20 at 08:00;  Stop 

4/8/20 at 08:53;  Status DC


Diphenhydramine HCl (Benadryl) 25 mg 1X PRN  PRN IV ITCHING;  Start 4/8/20 at 

08:00;  Stop 4/9/20 at 07:59;  Status DC


Diphenhydramine HCl (Benadryl) 25 mg 1X PRN  PRN IV ITCHING;  Start 4/8/20 at 

08:00;  Stop 4/9/20 at 07:59;  Status DC


Info (PHARMACY MONITORING -- do not chart) 1 each PRN DAILY  PRN MC SEE 

COMMENTS;  Start 4/8/20 at 08:00;  Stop 4/9/20 at 08:16;  Status DC


Albumin Human 50 ml @ 50 mls/hr 1X  ONCE IV ;  Start 4/8/20 at 08:53;  Stop 

4/8/20 at 08:56;  Status DC


Albumin Human 200 ml @  50 mls/hr PRN 1X  PRN IV HYPOTENSION Last administered 

on 4/14/20at 11:54;  Start 4/8/20 at 09:00;  Stop 5/21/20 at 11:14;  Status DC


Meropenem 500 mg/ Sodium Chloride 50 ml @  100 mls/hr Q12H IV  Last administered

on 4/28/20at 10:45;  Start 4/8/20 at 10:00;  Stop 4/28/20 at 12:37;  Status DC


Sodium Chloride 90 meq/Magnesium Sulfate 12 meq/ Calcium Gluconate 15 meq/ 

Multivitamins 10 ml/Chromium/ Copper/Manganese/ Seleni/Zn 0.5 ml/ Insulin Human 

Regular 25 unit/ Total Parenteral Nutrition/Amino Acids/Dextrose/ Fat Emulsion 

Intravenous 1,400 ml @  58.333 mls/ hr TPN  CONT IV  Last administered on 

4/8/20at 21:41;  Start 4/8/20 at 22:00;  Stop 4/9/20 at 21:59;  Status DC


Sodium Chloride 1,000 ml @  1,000 mls/hr Q1H PRN IV hypotension;  Start 4/9/20 

at 07:58;  Stop 4/9/20 at 13:57;  Status DC


Albumin Human 200 ml @  200 mls/hr 1X PRN  PRN IV Hypotension Last administered 

on 4/9/20at 09:30;  Start 4/9/20 at 08:00;  Stop 4/9/20 at 13:59;  Status DC


Sodium Chloride 1,000 ml @  400 mls/hr Q2H30M PRN IV PATENCY;  Start 4/9/20 at 

07:58;  Stop 4/9/20 at 19:57;  Status DC


Info (PHARMACY MONITORING -- do not chart) 1 each PRN DAILY  PRN MC SEE 

COMMENTS;  Start 4/9/20 at 08:00;  Status Cancel


Info (PHARMACY MONITORING -- do not chart) 1 each PRN DAILY  PRN MC SEE 

COMMENTS;  Start 4/9/20 at 08:15;  Status UNV


Sodium Chloride 90 meq/Potassium Phosphate 5 mmol/ Magnesium Sulfate 12 

meq/Calcium Gluconate 15 meq/ Multivitamins 10 ml/Chromium/ Copper/Manganese/ 

Seleni/Zn 0.5 ml/ Insulin Human Regular 30 unit/ Total Parenteral 

Nutrition/Amino Acids/Dextrose/ Fat Emulsion Intravenous 1,400 ml @  58.333 mls/

hr TPN  CONT IV  Last administered on 4/9/20at 22:08;  Start 4/9/20 at 22:00;  

Stop 4/10/20 at 21:59;  Status DC


Linezolid/Dextrose 300 ml @  300 mls/hr Q12HR IV  Last administered on 4/20/20at

20:40;  Start 4/10/20 at 11:00;  Stop 4/21/20 at 08:10;  Status DC


Sodium Chloride 90 meq/Potassium Phosphate 15 mmol/ Magnesium Sulfate 12 

meq/Calcium Gluconate 15 meq/ Multivitamins 10 ml/Chromium/ Copper/Manganese/ 

Seleni/Zn 0.5 ml/ Insulin Human Regular 30 unit/ Total Parenteral 

Nutrition/Amino Acids/Dextrose/ Fat Emulsion Intravenous 1,400 ml @  58.333 mls/

hr TPN  CONT IV  Last administered on 4/10/20at 21:49;  Start 4/10/20 at 22:00; 

Stop 4/11/20 at 21:59;  Status DC


Sodium Chloride 90 meq/Potassium Phosphate 15 mmol/ Magnesium Sulfate 12 

meq/Calcium Gluconate 15 meq/ Multivitamins 10 ml/Chromium/ Copper/Manganese/ 

Seleni/Zn 0.5 ml/ Insulin Human Regular 40 unit/ Total Parenteral 

Nutrition/Amino Acids/Dextrose/ Fat Emulsion Intravenous 1,400 ml @  58.333 mls/

hr TPN  CONT IV  Last administered on 4/11/20at 21:21;  Start 4/11/20 at 22:00; 

Stop 4/12/20 at 21:59;  Status DC


Sodium Chloride 1,000 ml @  1,000 mls/hr Q1H PRN IV hypotension;  Start 4/11/20 

at 13:26;  Stop 4/11/20 at 19:25;  Status DC


Albumin Human 200 ml @  200 mls/hr 1X PRN  PRN IV Hypotension Last administered 

on 4/11/20at 15:00;  Start 4/11/20 at 13:30;  Stop 4/11/20 at 19:29;  Status DC


Sodium Chloride (Normal Saline Flush) 10 ml 1X PRN  PRN IV AP catheter pack;  

Start 4/11/20 at 13:30;  Stop 4/12/20 at 13:29;  Status DC


Sodium Chloride (Normal Saline Flush) 10 ml 1X PRN  PRN IV  catheter pack;  

Start 4/11/20 at 13:30;  Stop 4/12/20 at 13:29;  Status DC


Sodium Chloride 1,000 ml @  400 mls/hr Q2H30M PRN IV PATENCY;  Start 4/11/20 at 

13:26;  Stop 4/12/20 at 01:25;  Status DC


Info (PHARMACY MONITORING -- do not chart) 1 each PRN DAILY  PRN MC SEE 

COMMENTS;  Start 4/11/20 at 13:30;  Stop 4/11/20 at 13:33;  Status DC


Info (PHARMACY MONITORING -- do not chart) 1 each PRN DAILY  PRN MC SEE 

COMMENTS;  Start 4/11/20 at 13:30;  Stop 4/11/20 at 13:34;  Status DC


Sodium Chloride 90 meq/Potassium Phosphate 19 mmol/ Magnesium Sulfate 12 

meq/Calcium Gluconate 15 meq/ Multivitamins 10 ml/Chromium/ Copper/Manganese/ 

Seleni/Zn 0.5 ml/ Insulin Human Regular 40 unit/ Total Parenteral 

Nutrition/Amino Acids/Dextrose/ Fat Emulsion Intravenous 1,400 ml @  58.333 mls/

hr TPN  CONT IV  Last administered on 4/12/20at 21:54;  Start 4/12/20 at 22:00; 

Stop 4/13/20 at 21:59;  Status DC


Sodium Chloride 1,000 ml @  1,000 mls/hr Q1H PRN IV hypotension;  Start 4/13/20 

at 09:35;  Stop 4/13/20 at 15:34;  Status DC


Albumin Human 200 ml @  200 mls/hr 1X PRN  PRN IV Hypotension;  Start 4/13/20 at

09:45;  Stop 4/13/20 at 15:44;  Status DC


Diphenhydramine HCl (Benadryl) 25 mg 1X PRN  PRN IV ITCHING;  Start 4/13/20 at 

09:45;  Stop 4/14/20 at 09:44;  Status DC


Diphenhydramine HCl (Benadryl) 25 mg 1X PRN  PRN IV ITCHING;  Start 4/13/20 at 

09:45;  Stop 4/14/20 at 09:44;  Status DC


Sodium Chloride 1,000 ml @  400 mls/hr Q2H30M PRN IV PATENCY;  Start 4/13/20 at 

09:35;  Stop 4/13/20 at 21:34;  Status DC


Info (PHARMACY MONITORING -- do not chart) 1 each PRN DAILY  PRN MC SEE 

COMMENTS;  Start 4/13/20 at 09:45;  Status Cancel


Sodium Chloride 100 meq/Potassium Phosphate 19 mmol/ Magnesium Sulfate 12 

meq/Calcium Gluconate 15 meq/ Multivitamins 10 ml/Chromium/ Copper/Manganese/ 

Seleni/Zn 0.5 ml/ Insulin Human Regular 40 unit/ Potassium Chloride 20 meq/ 

Total Parenteral Nutrition/Amino Acids/Dextrose/ Fat Emulsion Intravenous 1,400 

ml @  58.333 mls/ hr TPN  CONT IV  Last administered on 4/13/20at 22:02;  Start 

4/13/20 at 22:00;  Stop 4/14/20 at 21:59;  Status DC


Furosemide (Lasix) 40 mg 1X  ONCE IVP  Last administered on 4/13/20at 14:39;  

Start 4/13/20 at 14:30;  Stop 4/13/20 at 14:31;  Status DC


Metronidazole 100 ml @  100 mls/hr Q8HRS IV  Last administered on 4/21/20at 

06:04;  Start 4/14/20 at 10:00;  Stop 4/21/20 at 08:10;  Status DC


Sodium Chloride 1,000 ml @  1,000 mls/hr Q1H PRN IV hypotension;  Start 4/14/20 

at 08:00;  Stop 4/14/20 at 13:59;  Status DC


Albumin Human 200 ml @  200 mls/hr 1X PRN  PRN IV Hypotension;  Start 4/14/20 at

08:00;  Stop 4/14/20 at 13:59;  Status DC


Sodium Chloride 1,000 ml @  400 mls/hr Q2H30M PRN IV PATENCY;  Start 4/14/20 at 

08:00;  Stop 4/14/20 at 19:59;  Status DC


Info (PHARMACY MONITORING -- do not chart) 1 each PRN DAILY  PRN MC SEE 

COMMENTS;  Start 4/14/20 at 11:30;  Status UNV


Info (PHARMACY MONITORING -- do not chart) 1 each PRN DAILY  PRN MC SEE 

COMMENTS;  Start 4/14/20 at 11:30;  Stop 4/16/20 at 12:13;  Status DC


Sodium Chloride 100 meq/Potassium Phosphate 19 mmol/ Magnesium Sulfate 12 

meq/Calcium Gluconate 15 meq/ Multivitamins 10 ml/Chromium/ Copper/Manganese/ 

Seleni/Zn 0.5 ml/ Insulin Human Regular 40 unit/ Potassium Chloride 20 meq/ 

Total Parenteral Nutrition/Amino Acids/Dextrose/ Fat Emulsion Intravenous 1,400 

ml @  58.333 mls/ hr TPN  CONT IV  Last administered on 4/14/20at 21:52;  Start 

4/14/20 at 22:00;  Stop 4/15/20 at 21:59;  Status DC


Sodium Chloride (Normal Saline Flush) 10 ml QSHIFT  PRN IV AFTER MEDS AND BLOOD 

DRAWS;  Start 4/14/20 at 15:00;  Stop 5/12/20 at 11:27;  Status DC


Sodium Chloride (Normal Saline Flush) 10 ml PRN Q5MIN  PRN IV AFTER MEDS AND 

BLOOD DRAWS;  Start 4/14/20 at 15:00;  Stop 8/1/20 at 10:12;  Status DC


Sodium Chloride (Normal Saline Flush) 20 ml PRN Q5MIN  PRN IV AFTER MEDS AND 

BLOOD DRAWS;  Start 4/14/20 at 15:00


Sodium Chloride 100 meq/Potassium Phosphate 19 mmol/ Magnesium Sulfate 12 

meq/Calcium Gluconate 15 meq/ Multivitamins 10 ml/Chromium/ Copper/Manganese/ 

Seleni/Zn 0.5 ml/ Insulin Human Regular 40 unit/ Potassium Chloride 20 meq/ 

Total Parenteral Nutrition/Amino Acids/Dextrose/ Fat Emulsion Intravenous 1,400 

ml @  58.333 mls/ hr TPN  CONT IV  Last administered on 4/15/20at 21:20;  Start 

4/15/20 at 22:00;  Stop 4/16/20 at 21:59;  Status DC


Lidocaine HCl (Buffered Lidocaine 1%) 3 ml STK-MED ONCE .ROUTE ;  Start 4/15/20 

at 13:16;  Stop 4/15/20 at 13:16;  Status DC


Lidocaine HCl (Buffered Lidocaine 1%) 6 ml 1X  ONCE INJ  Last administered on 

4/15/20at 13:45;  Start 4/15/20 at 13:30;  Stop 4/15/20 at 13:31;  Status DC


Albumin Human 100 ml @  100 mls/hr 1X  ONCE IV  Last administered on 4/15/20at 

15:41;  Start 4/15/20 at 15:00;  Stop 4/15/20 at 15:59;  Status DC


Albumin Human 50 ml @ 50 mls/hr 1X  ONCE IV  Last administered on 4/15/20at 

15:00;  Start 4/15/20 at 15:00;  Stop 4/15/20 at 15:59;  Status DC


Info (PHARMACY MONITORING -- do not chart) 1 each PRN DAILY  PRN MC SEE COMMENT

S;  Start 4/16/20 at 11:30;  Status Cancel


Info (PHARMACY MONITORING -- do not chart) 1 each PRN DAILY  PRN MC SEE 

COMMENTS;  Start 4/16/20 at 11:30;  Status UNV


Sodium Chloride 100 meq/Potassium Phosphate 10 mmol/ Magnesium Sulfate 12 

meq/Calcium Gluconate 15 meq/ Multivitamins 10 ml/Chromium/ Copper/Manganese/ 

Seleni/Zn 0.5 ml/ Insulin Human Regular 35 unit/ Potassium Chloride 20 meq/ 

Total Parenteral Nutrition/Amino Acids/Dextrose/ Fat Emulsion Intravenous 1,400 

ml @  58.333 mls/ hr TPN  CONT IV  Last administered on 4/16/20at 22:10;  Start 

4/16/20 at 22:00;  Stop 4/17/20 at 21:59;  Status DC


Sodium Chloride 100 meq/Potassium Phosphate 5 mmol/ Magnesium Sulfate 12 

meq/Calcium Gluconate 15 meq/ Multivitamins 10 ml/Chromium/ Copper/Manganese/ 

Seleni/Zn 0.5 ml/ Insulin Human Regular 35 unit/ Potassium Chloride 20 meq/ 

Total Parenteral Nutrition/Amino Acids/Dextrose/ Fat Emulsion Intravenous 1,400 

ml @  58.333 mls/ hr TPN  CONT IV  Last administered on 4/17/20at 22:59;  Start 

4/17/20 at 22:00;  Stop 4/18/20 at 21:59;  Status DC


Sodium Chloride 1,000 ml @  1,000 mls/hr Q1H PRN IV hypotension;  Start 4/18/20 

at 08:27;  Stop 4/18/20 at 14:26;  Status DC


Albumin Human 200 ml @  200 mls/hr 1X PRN  PRN IV Hypotension Last administered 

on 4/18/20at 09:18;  Start 4/18/20 at 08:30;  Stop 4/18/20 at 14:29;  Status DC


Sodium Chloride 1,000 ml @  400 mls/hr Q2H30M PRN IV PATENCY;  Start 4/18/20 at 

08:27;  Stop 4/18/20 at 20:26;  Status DC


Info (PHARMACY MONITORING -- do not chart) 1 each PRN DAILY  PRN MC SEE 

COMMENTS;  Start 4/18/20 at 08:30;  Status Cancel


Info (PHARMACY MONITORING -- do not chart) 1 each PRN DAILY  PRN MC SEE 

COMMENTS;  Start 4/18/20 at 08:30;  Stop 4/26/20 at 13:10;  Status DC


Sodium Chloride 100 meq/Potassium Chloride 40 meq/ Magnesium Sulfate 15 meq/Ca

lcium Gluconate 15 meq/ Multivitamins 10 ml/Chromium/ Copper/Manganese/ 

Seleni/Zn 0.5 ml/ Insulin Human Regular 35 unit/ Total Parenteral 

Nutrition/Amino Acids/Dextrose/ Fat Emulsion Intravenous 1,400 ml @  58.333 mls/

hr TPN  CONT IV  Last administered on 4/18/20at 22:00;  Start 4/18/20 at 22:00; 

Stop 4/19/20 at 21:59;  Status DC


Potassium Chloride/Water 100 ml @  100 mls/hr 1X  ONCE IV  Last administered on 

4/18/20at 17:28;  Start 4/18/20 at 14:45;  Stop 4/18/20 at 15:44;  Status DC


Sodium Chloride 100 meq/Potassium Chloride 40 meq/ Magnesium Sulfate 15 

meq/Calcium Gluconate 15 meq/ Multivitamins 10 ml/Chromium/ Copper/Manganese/ 

Seleni/Zn 0.5 ml/ Insulin Human Regular 35 unit/ Total Parenteral 

Nutrition/Amino Acids/Dextrose/ Fat Emulsion Intravenous 1,400 ml @  58.333 mls/

hr TPN  CONT IV  Last administered on 4/19/20at 22:46;  Start 4/19/20 at 22:00; 

Stop 4/20/20 at 21:59;  Status DC


Sodium Chloride 100 meq/Potassium Chloride 40 meq/ Magnesium Sulfate 20 

meq/Calcium Gluconate 15 meq/ Multivitamins 10 ml/Chromium/ Copper/Manganese/ 

Seleni/Zn 0.5 ml/ Insulin Human Regular 35 unit/ Total Parenteral 

Nutrition/Amino Acids/Dextrose/ Fat Emulsion Intravenous 1,400 ml @  58.333 mls/

hr TPN  CONT IV  Last administered on 4/20/20at 22:31;  Start 4/20/20 at 22:00; 

Stop 4/21/20 at 21:59;  Status DC


Fentanyl Citrate (Fentanyl 2ml Vial) 50 mcg PRN Q2HR  PRN IVP PAIN Last 

administered on 4/27/20at 13:32;  Start 4/20/20 at 21:00;  Stop 4/28/20 at 

12:53;  Status DC


Fentanyl Citrate (Fentanyl 2ml Vial) 25 mcg PRN Q2HR  PRN IVP PAIN;  Start 

4/20/20 at 21:00;  Stop 4/28/20 at 12:54;  Status DC


Enoxaparin Sodium (Lovenox 100mg Syringe) 100 mg Q12HR SQ ;  Start 4/21/20 at 

21:00;  Status UNV


Amino Acids/ Glycerin/ Electrolytes 1,000 ml @  75 mls/hr [id number] IV ;  Start 

4/20/20 at 21:15;  Status UNV


Sodium Chloride 1,000 ml @  1,000 mls/hr Q1H PRN IV hypotension;  Start 4/21/20 

at 07:56;  Stop 4/21/20 at 13:55;  Status DC


Albumin Human 200 ml @  200 mls/hr 1X PRN  PRN IV Hypotension Last administered 

on 4/21/20at 08:40;  Start 4/21/20 at 08:00;  Stop 4/21/20 at 13:59;  Status DC


Sodium Chloride 1,000 ml @  400 mls/hr Q2H30M PRN IV PATENCY;  Start 4/21/20 at 

07:56;  Stop 4/21/20 at 19:55;  Status DC


Info (PHARMACY MONITORING -- do not chart) 1 each PRN DAILY  PRN MC SEE 

COMMENTS;  Start 4/21/20 at 08:00;  Status UNV


Info (PHARMACY MONITORING -- do not chart) 1 each PRN DAILY  PRN MC SEE 

COMMENTS;  Start 4/21/20 at 08:00;  Status UNV


Daptomycin 430 mg/ Sodium Chloride 50 ml @  100 mls/hr Q24H IV  Last 

administered on 4/21/20at 12:35;  Start 4/21/20 at 09:00;  Stop 4/21/20 at 

12:49;  Status DC


Sodium Chloride 100 meq/Potassium Chloride 40 meq/ Magnesium Sulfate 20 

meq/Calcium Gluconate 15 meq/ Multivitamins 10 ml/Chromium/ Copper/Manganese/ 

Seleni/Zn 0.5 ml/ Insulin Human Regular 35 unit/ Total Parenteral 

Nutrition/Amino Acids/Dextrose/ Fat Emulsion Intravenous 1,400 ml @  58.333 mls/

hr TPN  CONT IV  Last administered on 4/21/20at 21:26;  Start 4/21/20 at 22:00; 

Stop 4/22/20 at 21:59;  Status DC


Daptomycin 430 mg/ Sodium Chloride 50 ml @  100 mls/hr Q48H IV ;  Start 4/23/20 

at 09:00;  Stop 4/22/20 at 11:55;  Status DC


Sodium Chloride 100 meq/Potassium Chloride 40 meq/ Magnesium Sulfate 20 

meq/Calcium Gluconate 15 meq/ Multivitamins 10 ml/Chromium/ Copper/Manganese/ 

Seleni/Zn 0.5 ml/ Insulin Human Regular 35 unit/ Total Parenteral 

Nutrition/Amino Acids/Dextrose/ Fat Emulsion Intravenous 1,400 ml @  58.333 mls/

hr TPN  CONT IV  Last administered on 4/22/20at 22:27;  Start 4/22/20 at 22:00; 

Stop 4/23/20 at 21:59;  Status DC


Daptomycin 430 mg/ Sodium Chloride 50 ml @  100 mls/hr Q24H IV  Last 

administered on 4/24/20at 15:07;  Start 4/22/20 at 13:00;  Stop 4/25/20 at 

13:15;  Status DC


Sodium Chloride 100 meq/Potassium Chloride 40 meq/ Magnesium Sulfate 20 

meq/Calcium Gluconate 10 meq/ Multivitamins 10 ml/Chromium/ Copper/Manganese/ 

Seleni/Zn 0.5 ml/ Insulin Human Regular 35 unit/ Total Parenteral 

Nutrition/Amino Acids/Dextrose/ Fat Emulsion Intravenous 1,400 ml @  58.333 mls/

hr TPN  CONT IV  Last administered on 4/24/20at 00:06;  Start 4/23/20 at 22:00; 

Stop 4/24/20 at 21:59;  Status DC


Alteplase, Recombinant (Cathflo For Central Catheter Clearance) 1 mg 1X  ONCE 

INT CAT  Last administered on 4/24/20at 11:44;  Start 4/24/20 at 10:45;  Stop 

4/24/20 at 10:46;  Status DC


Ondansetron HCl (Zofran) 4 mg PRN Q6HRS  PRN IV NAUSEA/VOMITING;  Start 4/27/20 

at 07:00;  Stop 4/28/20 at 06:59;  Status DC


Fentanyl Citrate (Fentanyl 2ml Vial) 25 mcg PRN Q5MIN  PRN IV MILD PAIN 1-3;  

Start 4/27/20 at 07:00;  Stop 4/28/20 at 06:59;  Status DC


Fentanyl Citrate (Fentanyl 2ml Vial) 50 mcg PRN Q5MIN  PRN IV MODERATE TO SEVERE

PAIN Last administered on 4/27/20at 10:17;  Start 4/27/20 at 07:00;  Stop 

4/28/20 at 06:59;  Status DC


Ringer's Solution 1,000 ml @  30 mls/hr Q24H IV ;  Start 4/27/20 at 07:00;  Stop

4/27/20 at 18:59;  Status DC


Lidocaine HCl (Xylocaine-Mpf 1% 2ml Vial) 2 ml PRN 1X  PRN ID PRIOR TO IV START;

 Start 4/27/20 at 07:00;  Stop 4/28/20 at 06:59;  Status DC


Prochlorperazine Edisylate (Compazine) 5 mg PACU PRN  PRN IV NAUSEA, MRX1;  

Start 4/27/20 at 07:00;  Stop 4/28/20 at 06:59;  Status DC


Sodium Acetate 50 meq/Potassium Acetate 55 meq/ Magnesium Sulfate 20 meq/Calcium

Gluconate 10 meq/ Multivitamins 10 ml/Chromium/ Copper/Manganese/ Seleni/Zn 0.5 

ml/ Insulin Human Regular 35 unit/ Total Parenteral Nutrition/Amino 

Acids/Dextrose/ Fat Emulsion Intravenous 1,400 ml @  58.333 mls/ hr TPN  CONT IV

;  Start 4/24/20 at 22:00;  Stop 4/24/20 at 14:15;  Status DC


Sodium Acetate 50 meq/Potassium Acetate 55 meq/ Magnesium Sulfate 20 meq/Calcium

Gluconate 10 meq/ Multivitamins 10 ml/Chromium/ Copper/Manganese/ Seleni/Zn 0.5 

ml/ Insulin Human Regular 35 unit/ Total Parenteral Nutrition/Amino 

Acids/Dextrose/ Fat Emulsion Intravenous 1,800 ml @  75 mls/hr TPN  CONT IV  

Last administered on 4/24/20at 22:38;  Start 4/24/20 at 22:00;  Stop 4/25/20 at 

21:59;  Status DC


Sodium Chloride 1,000 ml @  1,000 mls/hr Q1H PRN IV hypotension;  Start 4/24/20 

at 15:31;  Stop 4/24/20 at 21:30;  Status DC


Diphenhydramine HCl (Benadryl) 25 mg 1X PRN  PRN IV ITCHING;  Start 4/24/20 at 

15:45;  Stop 4/25/20 at 15:44;  Status DC


Diphenhydramine HCl (Benadryl) 25 mg 1X PRN  PRN IV ITCHING;  Start 4/24/20 at 

15:45;  Stop 4/25/20 at 15:44;  Status DC


Sodium Chloride 1,000 ml @  400 mls/hr Q2H30M PRN IV PATENCY;  Start 4/24/20 at 

15:31;  Stop 4/25/20 at 03:30;  Status DC


Info (PHARMACY MONITORING -- do not chart) 1 each PRN DAILY  PRN MC SEE 

COMMENTS;  Start 4/24/20 at 15:45;  Stop 5/26/20 at 14:14;  Status DC


Sodium Acetate 50 meq/Potassium Acetate 55 meq/ Magnesium Sulfate 20 meq/Calcium

Gluconate 10 meq/ Multivitamins 10 ml/Chromium/ Copper/Manganese/ Seleni/Zn 0.5 

ml/ Insulin Human Regular 35 unit/ Total Parenteral Nutrition/Amino 

Acids/Dextrose/ Fat Emulsion Intravenous 1,800 ml @  75 mls/hr TPN  CONT IV  

Last administered on 4/25/20at 22:03;  Start 4/25/20 at 22:00;  Stop 4/26/20 at 

21:59;  Status DC


Daptomycin 430 mg/ Sodium Chloride 50 ml @  100 mls/hr Q24H IV  Last 

administered on 4/30/20at 13:00;  Start 4/25/20 at 13:00;  Stop 4/30/20 at 

20:58;  Status DC


Heparin Sodium (Porcine) 1000 unit/Sodium Chloride 1,001 ml @  1,001 mls/hr 1X  

ONCE IRR ;  Start 4/27/20 at 06:00;  Stop 4/27/20 at 06:59;  Status DC


Potassium Acetate 55 meq/Magnesium Sulfate 20 meq/ Calcium Gluconate 10 meq/ 

Multivitamins 10 ml/Chromium/ Copper/Manganese/ Seleni/Zn 0.5 ml/ Insulin Human 

Regular 35 unit/ Total Parenteral Nutrition/Amino Acids/Dextrose/ Fat Emulsion 

Intravenous 1,920 ml @  80 mls/hr TPN  CONT IV  Last administered on 4/26/20at 

22:10;  Start 4/26/20 at 22:00;  Stop 4/27/20 at 21:59;  Status DC


Dexamethasone Sodium Phosphate (Decadron) 4 mg STK-MED ONCE .ROUTE ;  Start 

4/27/20 at 10:56;  Stop 4/27/20 at 10:57;  Status DC


Ondansetron HCl (Zofran) 4 mg STK-MED ONCE .ROUTE ;  Start 4/27/20 at 10:56;  

Stop 4/27/20 at 10:57;  Status DC


Rocuronium Bromide (Zemuron) 50 mg STK-MED ONCE .ROUTE ;  Start 4/27/20 at 

10:56;  Stop 4/27/20 at 10:57;  Status DC


Fentanyl Citrate (Fentanyl 2ml Vial) 100 mcg STK-MED ONCE .ROUTE ;  Start 

4/27/20 at 10:56;  Stop 4/27/20 at 10:57;  Status DC


Bupivacaine HCl/ Epinephrine Bitart (Sensorcain-Epi 0.5%-1:293194 Mpf) 30 ml 

STK-MED ONCE .ROUTE  Last administered on 4/27/20at 12:01;  Start 4/27/20 at 

10:58;  Stop 4/27/20 at 10:58;  Status DC


Cellulose (Surgicel Hemostat 2x14) 1 each STK-MED ONCE .ROUTE ;  Start 4/27/20 

at 10:58;  Stop 4/27/20 at 10:59;  Status DC


Iohexol (Omnipaque 300 Mg/ml) 50 ml STK-MED ONCE .ROUTE ;  Start 4/27/20 at 

10:58;  Stop 4/27/20 at 10:59;  Status DC


Cellulose (Surgicel Hemostat 4x8) 1 each STK-MED ONCE .ROUTE ;  Start 4/27/20 at

10:58;  Stop 4/27/20 at 10:59;  Status DC


Bisacodyl (Dulcolax Supp) 10 mg STK-MED ONCE .ROUTE ;  Start 4/27/20 at 10:59;  

Stop 4/27/20 at 10:59;  Status DC


Heparin Sodium (Porcine) 1000 unit/Sodium Chloride 1,001 ml @  1,001 mls/hr 1X  

ONCE IRR ;  Start 4/27/20 at 12:00;  Stop 4/27/20 at 12:59;  Status DC


Propofol 20 ml @ As Directed STK-MED ONCE IV ;  Start 4/27/20 at 11:05;  Stop 

4/27/20 at 11:05;  Status DC


Sevoflurane (Ultane) 90 ml STK-MED ONCE IH ;  Start 4/27/20 at 11:05;  Stop 

4/27/20 at 11:05;  Status DC


Sevoflurane (Ultane) 60 ml STK-MED ONCE IH ;  Start 4/27/20 at 12:26;  Stop 

4/27/20 at 12:27;  Status DC


Propofol 20 ml @ As Directed STK-MED ONCE IV ;  Start 4/27/20 at 12:26;  Stop 

4/27/20 at 12:27;  Status DC


Phenylephrine HCl (PHENYLEPHRINE in 0.9% NACL PF) 1 mg STK-MED ONCE IV ;  Start 

4/27/20 at 12:34;  Stop 4/27/20 at 12:34;  Status DC


Heparin Sodium (Porcine) (Heparin Sodium) 5,000 unit Q12HR SQ  Last administered

on 5/6/20at 20:57;  Start 4/27/20 at 21:00;  Stop 5/7/20 at 09:59;  Status DC


Sodium Chloride (Normal Saline Flush) 3 ml QSHIFT  PRN IV AFTER MEDS AND BLOOD 

DRAWS;  Start 4/27/20 at 13:45;  Status Cancel


Naloxone HCl (Narcan) 0.4 mg PRN Q2MIN  PRN IV SEE INSTRUCTIONS Last 

administered on 6/6/20at 15:15;  Start 4/27/20 at 13:45;  Stop 7/1/20 at 16:00; 

Status DC


Sodium Chloride 1,000 ml @  25 mls/hr Q24H IV  Last administered on 5/26/20at 

13:37;  Start 4/27/20 at 13:37;  Stop 5/29/20 at 13:09;  Status DC


Naloxone HCl (Narcan) 0.4 mg PRN Q2MIN  PRN IV SEE INSTRUCTIONS;  Start 4/27/20 

at 14:30;  Status UNV


Sodium Chloride 1,000 ml @  25 mls/hr Q24H IV ;  Start 4/27/20 at 14:30;  Status

UNV


Hydromorphone HCl 30 ml @ 0 mls/hr CONT PRN  PRN IV PER PROTOCOL Last 

administered on 5/2/20at 16:08;  Start 4/27/20 at 14:30;  Stop 5/4/20 at 08:55; 

Status DC


Potassium Acetate 55 meq/Magnesium Sulfate 20 meq/ Calcium Gluconate 10 meq/ 

Multivitamins 10 ml/Chromium/ Copper/Manganese/ Seleni/Zn 0.5 ml/ Insulin Human 

Regular 35 unit/ Total Parenteral Nutrition/Amino Acids/Dextrose/ Fat Emulsion 

Intravenous 1,920 ml @  80 mls/hr TPN  CONT IV  Last administered on 4/27/20at 

22:01;  Start 4/27/20 at 22:00;  Stop 4/28/20 at 21:59;  Status DC


Bumetanide (Bumex) 2 mg [id number] IV  Last administered on 5/1/20at 13:50;  Start 

4/28/20 at 14:00;  Stop 5/2/20 at 14:10;  Status DC


Meropenem 1 gm/ Sodium Chloride 100 ml @  200 mls/hr Q8HRS IV  Last administered

on 5/22/20at 05:53;  Start 4/28/20 at 14:00;  Stop 5/22/20 at 09:31;  Status DC


Potassium Acetate 55 meq/Magnesium Sulfate 20 meq/ Calcium Gluconate 10 meq/ 

Multivitamins 10 ml/Chromium/ Copper/Manganese/ Seleni/Zn 0.5 ml/ Insulin Human 

Regular 35 unit/ Total Parenteral Nutrition/Amino Acids/Dextrose/ Fat Emulsion 

Intravenous 1,920 ml @  80 mls/hr TPN  CONT IV  Last administered on 4/28/20at 

22:02;  Start 4/28/20 at 22:00;  Stop 4/29/20 at 21:59;  Status DC


Hydromorphone HCl (Dilaudid Standard PCA) 12 mg STK-MED ONCE IV ;  Start 4/27/20

at 14:35;  Stop 4/28/20 at 13:53;  Status DC


Artificial Tears (Artificial Tears) 1 drop PRN Q15MIN  PRN OU DRY EYE Last 

administered on 6/23/20at 21:17;  Start 4/29/20 at 05:30


Hydromorphone HCl (Dilaudid Standard PCA) 12 mg STK-MED ONCE IV ;  Start 4/28/20

at 12:05;  Stop 4/29/20 at 09:15;  Status DC


Potassium Acetate 65 meq/Magnesium Sulfate 20 meq/ Calcium Gluconate 10 meq/ 

Multivitamins 10 ml/Chromium/ Copper/Manganese/ Seleni/Zn 0.5 ml/ Insulin Human 

Regular 30 unit/ Total Parenteral Nutrition/Amino Acids/Dextrose/ Fat Emulsion 

Intravenous 1,920 ml @  80 mls/hr TPN  CONT IV  Last administered on 4/29/20at 

22:22;  Start 4/29/20 at 22:00;  Stop 4/30/20 at 21:59;  Status DC


Cyclobenzaprine HCl (Flexeril) 10 mg PRN Q6HRS  PRN PO MUSCLE SPASMS Last 

administered on 8/14/20at 00:29;  Start 4/30/20 at 10:45


Potassium Acetate 55 meq/Magnesium Sulfate 20 meq/ Calcium Gluconate 10 meq/ 

Multivitamins 10 ml/Chromium/ Copper/Manganese/ Seleni/Zn 0.5 ml/ Insulin Human 

Regular 30 unit/ Total Parenteral Nutrition/Amino Acids/Dextrose/ Fat Emulsion 

Intravenous 1,920 ml @  80 mls/hr TPN  CONT IV  Last administered on 5/1/20at 

01:00;  Start 4/30/20 at 22:00;  Stop 5/1/20 at 21:59;  Status DC


Magnesium Sulfate 50 ml @ 25 mls/hr 1X  ONCE IV  Last administered on 4/30/20at 

17:18;  Start 4/30/20 at 12:45;  Stop 4/30/20 at 14:44;  Status DC


Potassium Chloride/Water 100 ml @  100 mls/hr 1X  ONCE IV  Last administered on 

5/1/20at 11:27;  Start 5/1/20 at 12:00;  Stop 5/1/20 at 12:59;  Status DC


Hydromorphone HCl (Dilaudid Standard PCA) 12 mg STK-MED ONCE IV ;  Start 4/29/20

at 10:50;  Stop 5/1/20 at 11:02;  Status DC


Hydromorphone HCl (Dilaudid Standard PCA) 12 mg STK-MED ONCE IV ;  Start 4/30/20

at 13:47;  Stop 5/1/20 at 11:03;  Status DC


Potassium Acetate 30 meq/Magnesium Sulfate 20 meq/ Calcium Gluconate 10 meq/ 

Multivitamins 10 ml/Chromium/ Copper/Manganese/ Seleni/Zn 0.5 ml/ Insulin Human 

Regular 30 unit/ Potassium Chloride 30 meq/ Total Parenteral Nutrition/Amino 

Acids/Dextrose/ Fat Emulsion Intravenous 1,920 ml @  80 mls/hr TPN  CONT IV  

Last administered on 5/1/20at 22:34;  Start 5/1/20 at 22:00;  Stop 5/2/20 at 

21:59;  Status DC


Potassium Chloride/Water 100 ml @  100 mls/hr Q1H IV  Last administered on 

5/2/20at 13:05;  Start 5/2/20 at 07:00;  Stop 5/2/20 at 10:59;  Status DC


Magnesium Sulfate 50 ml @ 25 mls/hr 1X  ONCE IV  Last administered on 5/2/20at 

10:34;  Start 5/2/20 at 10:30;  Stop 5/2/20 at 12:29;  Status DC


Potassium Chloride 75 meq/ Magnesium Sulfate 20 meq/Calcium Gluconate 10 meq/ 

Multivitamins 10 ml/Chromium/ Copper/Manganese/ Seleni/Zn 0.5 ml/ Insulin Human 

Regular 30 unit/ Total Parenteral Nutrition/Amino Acids/Dextrose/ Fat Emulsion 

Intravenous 1,920 ml @  80 mls/hr TPN  CONT IV  Last administered on 5/2/20at 

21:51;  Start 5/2/20 at 22:00;  Stop 5/3/20 at 22:00;  Status DC


Potassium Chloride 75 meq/ Magnesium Sulfate 20 meq/Calcium Gluconate 10 meq/ 

Multivitamins 10 ml/Chromium/ Copper/Manganese/ Seleni/Zn 0.5 ml/ Insulin Human 

Regular 25 unit/ Total Parenteral Nutrition/Amino Acids/Dextrose/ Fat Emulsion 

Intravenous 1,920 ml @  80 mls/hr TPN  CONT IV  Last administered on 5/3/20at 

22:04;  Start 5/3/20 at 22:00;  Stop 5/4/20 at 21:59;  Status DC


Hydromorphone HCl (Dilaudid) 0.4 mg PRN Q4HRS  PRN IVP PAIN Last administered on

5/4/20at 10:57;  Start 5/4/20 at 09:00;  Stop 5/4/20 at 18:59;  Status DC


Micafungin Sodium 100 mg/Dextrose 100 ml @  100 mls/hr Q24H IV  Last 

administered on 5/26/20at 12:17;  Start 5/4/20 at 11:00;  Stop 5/27/20 at 09:59;

 Status DC


Daptomycin 485 mg/ Sodium Chloride 50 ml @  100 mls/hr Q24H IV  Last 

administered on 5/11/20at 13:10;  Start 5/4/20 at 11:00;  Stop 5/12/20 at 07:44;

 Status DC


Potassium Chloride 75 meq/ Magnesium Sulfate 15 meq/Calcium Gluconate 8 meq/ 

Multivitamins 10 ml/Chromium/ Copper/Manganese/ Seleni/Zn 0.5 ml/ Insulin Human 

Regular 25 unit/ Total Parenteral Nutrition/Amino Acids/Dextrose/ Fat Emulsion 

Intravenous 1,920 ml @  80 mls/hr TPN  CONT IV  Last administered on 5/4/20at 

23:08;  Start 5/4/20 at 22:00;  Stop 5/5/20 at 21:59;  Status DC


Haloperidol Lactate (Haldol Inj) 3 mg 1X  ONCE IVP  Last administered on 5 /4/20at 14:37;  Start 5/4/20 at 14:30;  Stop 5/4/20 at 14:31;  Status DC


Hydromorphone HCl (Dilaudid) 1 mg PRN Q4HRS  PRN IVP PAIN Last administered on 

5/18/20at 06:25;  Start 5/4/20 at 19:00;  Stop 5/18/20 at 17:10;  Status DC


Potassium Chloride 75 meq/ Magnesium Sulfate 15 meq/Calcium Gluconate 8 meq/ 

Multivitamins 10 ml/Chromium/ Copper/Manganese/ Seleni/Zn 0.5 ml/ Insulin Human 

Regular 20 unit/ Total Parenteral Nutrition/Amino Acids/Dextrose/ Fat Emulsion 

Intravenous 1,920 ml @  80 mls/hr TPN  CONT IV  Last administered on 5/5/20at 

22:10;  Start 5/5/20 at 22:00;  Stop 5/6/20 at 21:59;  Status DC


Lidocaine HCl (Buffered Lidocaine 1%) 3 ml STK-MED ONCE .ROUTE ;  Start 5/6/20 

at 11:31;  Stop 5/6/20 at 11:31;  Status DC


Lidocaine HCl (Buffered Lidocaine 1%) 3 ml STK-MED ONCE .ROUTE ;  Start 5/6/20 

at 12:28;  Stop 5/6/20 at 12:29;  Status DC


Lidocaine HCl (Buffered Lidocaine 1%) 6 ml 1X  ONCE INJ  Last administered on 

5/6/20at 12:53;  Start 5/6/20 at 12:45;  Stop 5/6/20 at 12:46;  Status DC


Potassium Chloride 75 meq/ Magnesium Sulfate 15 meq/Calcium Gluconate 8 meq/ 

Multivitamins 10 ml/Chromium/ Copper/Manganese/ Seleni/Zn 0.5 ml/ Insulin Human 

Regular 20 unit/ Total Parenteral Nutrition/Amino Acids/Dextrose/ Fat Emulsion 

Intravenous 1,920 ml @  80 mls/hr TPN  CONT IV  Last administered on 5/6/20at 

22:00;  Start 5/6/20 at 22:00;  Stop 5/7/20 at 21:59;  Status DC


Potassium Chloride 75 meq/ Magnesium Sulfate 15 meq/Calcium Gluconate 8 meq/ 

Multivitamins 10 ml/Chromium/ Copper/Manganese/ Seleni/Zn 0.5 ml/ Insulin Human 

Regular 15 unit/ Total Parenteral Nutrition/Amino Acids/Dextrose/ Fat Emulsion 

Intravenous 1,920 ml @  80 mls/hr TPN  CONT IV  Last administered on 5/7/20at 

22:28;  Start 5/7/20 at 22:00;  Stop 5/8/20 at 21:59;  Status DC


Vecuronium Bromide (Norcuron Bolus) 6 mg PRN Q6HRS  PRN IV VENT ASYNCHRONY;  

Start 5/7/20 at 19:15;  Stop 5/7/20 at 19:35;  Status DC


Bumetanide (Bumex) 2 mg 1X  ONCE IV  Last administered on 5/7/20at 22:09;  Start

5/7/20 at 19:45;  Stop 5/7/20 at 19:46;  Status DC


Lidocaine HCl (Buffered Lidocaine 1%) 3 ml STK-MED ONCE .ROUTE ;  Start 5/8/20 

at 07:59;  Stop 5/8/20 at 07:59;  Status DC


Midazolam HCl (Versed) 5 mg STK-MED ONCE .ROUTE ;  Start 5/8/20 at 08:36;  Stop 

5/8/20 at 08:36;  Status DC


Fentanyl Citrate (Fentanyl 5ml Vial) 250 mcg STK-MED ONCE .ROUTE ;  Start 5/8/20

at 08:36;  Stop 5/8/20 at 08:37;  Status DC


Lidocaine HCl (Buffered Lidocaine 1%) 3 ml 1X  ONCE IJ  Last administered on 

5/8/20at 09:30;  Start 5/8/20 at 09:15;  Stop 5/8/20 at 09:16;  Status DC


Midazolam HCl (Versed) 5 mg 1X  ONCE IV  Last administered on 5/8/20at 09:30;  

Start 5/8/20 at 09:15;  Stop 5/8/20 at 09:16;  Status DC


Fentanyl Citrate (Fentanyl 5ml Vial) 250 mcg 1X  ONCE IV  Last administered on 

5/8/20at 09:30;  Start 5/8/20 at 09:15;  Stop 5/8/20 at 09:16;  Status DC


Bumetanide (Bumex) 2 mg DAILY IV  Last administered on 5/18/20at 08:07;  Start 

5/8/20 at 10:00;  Stop 5/18/20 at 17:15;  Status DC


Potassium Chloride 75 meq/ Magnesium Sulfate 15 meq/ Multivitamins 10 

ml/Chromium/ Copper/Manganese/ Seleni/Zn 0.5 ml/ Insulin Human Regular 15 unit/ 

Total Parenteral Nutrition/Amino Acids/Dextrose/ Fat Emulsion Intravenous 1,920 

ml @  80 mls/hr TPN  CONT IV  Last administered on 5/8/20at 21:59;  Start 5/8/20

at 22:00;  Stop 5/9/20 at 21:59;  Status DC


Metoclopramide HCl (Reglan Vial) 10 mg PRN Q3HRS  PRN IVP NAUSEA/VOMITING-3rd 

choice Last administered on 5/14/20at 04:25;  Start 5/9/20 at 16:45


Potassium Chloride 75 meq/ Magnesium Sulfate 15 meq/ Multivitamins 10 

ml/Chromium/ Copper/Manganese/ Seleni/Zn 0.5 ml/ Insulin Human Regular 15 unit/ 

Total Parenteral Nutrition/Amino Acids/Dextrose/ Fat Emulsion Intravenous 1,920 

ml @  80 mls/hr TPN  CONT IV  Last administered on 5/9/20at 22:41;  Start 5/9/20

at 22:00;  Stop 5/10/20 at 21:59;  Status DC


Magnesium Sulfate 50 ml @ 25 mls/hr 1X  ONCE IV  Last administered on 5/10/20at 

10:44;  Start 5/10/20 at 09:00;  Stop 5/10/20 at 10:59;  Status DC


Potassium Chloride/Water 100 ml @  100 mls/hr 1X  ONCE IV  Last administered on 

5/10/20at 09:37;  Start 5/10/20 at 09:00;  Stop 5/10/20 at 09:59;  Status DC


Duloxetine HCl (Cymbalta) 30 mg DAILY PO  Last administered on 5/11/20at 09:48; 

Start 5/10/20 at 14:00;  Stop 5/13/20 at 10:25;  Status DC


Potassium Chloride 80 meq/ Magnesium Sulfate 20 meq/ Multivitamins 10 

ml/Chromium/ Copper/Manganese/ Seleni/Zn 0.5 ml/ Insulin Human Regular 15 unit/ 

Total Parenteral Nutrition/Amino Acids/Dextrose/ Fat Emulsion Intravenous 1,920 

ml @  80 mls/hr TPN  CONT IV  Last administered on 5/10/20at 21:42;  Start 

5/10/20 at 22:00;  Stop 5/11/20 at 21:59;  Status DC


Potassium Chloride 80 meq/ Magnesium Sulfate 20 meq/ Multivitamins 10 

ml/Chromium/ Copper/Manganese/ Seleni/Zn 0.5 ml/ Insulin Human Regular 15 unit/ 

Total Parenteral Nutrition/Amino Acids/Dextrose/ Fat Emulsion Intravenous 1,920 

ml @  80 mls/hr TPN  CONT IV  Last administered on 5/11/20at 22:20;  Start 

5/11/20 at 22:00;  Stop 5/12/20 at 21:59;  Status DC


Lidocaine HCl (Buffered Lidocaine 1%) 3 ml STK-MED ONCE .ROUTE ;  Start 5/12/20 

at 09:54;  Stop 5/12/20 at 09:55;  Status DC


Hydromorphone HCl (Dilaudid Standard PCA) 12 mg STK-MED ONCE IV ;  Start 5/1/20 

at 15:50;  Stop 5/12/20 at 11:24;  Status DC


Potassium Chloride 80 meq/ Magnesium Sulfate 20 meq/ Multivitamins 10 

ml/Chromium/ Copper/Manganese/ Seleni/Zn 0.5 ml/ Insulin Human Regular 15 unit/ 

Total Parenteral Nutrition/Amino Acids/Dextrose/ Fat Emulsion Intravenous 1,920 

ml @  80 mls/hr TPN  CONT IV  Last administered on 5/12/20at 21:40;  Start 

5/12/20 at 22:00;  Stop 5/13/20 at 21:59;  Status DC


Lidocaine HCl (Buffered Lidocaine 1%) 6 ml 1X  ONCE INJ  Last administered on 

5/12/20at 14:15;  Start 5/12/20 at 14:15;  Stop 5/12/20 at 14:16;  Status DC


Potassium Chloride 80 meq/ Magnesium Sulfate 20 meq/ Multivitamins 10 

ml/Chromium/ Copper/Manganese/ Seleni/Zn 1 ml/ Insulin Human Regular 15 unit/ 

Total Parenteral Nutrition/Amino Acids/Dextrose/ Fat Emulsion Intravenous 1,920 

ml @  80 mls/hr TPN  CONT IV  Last administered on 5/13/20at 22:04;  Start 

5/13/20 at 22:00;  Stop 5/14/20 at 21:59;  Status DC


Potassium Chloride/Water 100 ml @  100 mls/hr 1X  ONCE IV  Last administered on 

5/14/20at 11:34;  Start 5/14/20 at 11:00;  Stop 5/14/20 at 11:59;  Status DC


Potassium Chloride 90 meq/ Magnesium Sulfate 20 meq/ Multivitamins 10 

ml/Chromium/ Copper/Manganese/ Seleni/Zn 1 ml/ Insulin Human Regular 15 unit/ 

Total Parenteral Nutrition/Amino Acids/Dextrose/ Fat Emulsion Intravenous 1,920 

ml @  80 mls/hr TPN  CONT IV  Last administered on 5/14/20at 22:57;  Start 

5/14/20 at 22:00;  Stop 5/15/20 at 21:59;  Status DC


Potassium Chloride 90 meq/ Magnesium Sulfate 20 meq/ Multivitamins 10 

ml/Chromium/ Copper/Manganese/ Seleni/Zn 1 ml/ Insulin Human Regular 15 unit/ 

Total Parenteral Nutrition/Amino Acids/Dextrose/ Fat Emulsion Intravenous 1,920 

ml @  80 mls/hr TPN  CONT IV  Last administered on 5/15/20at 22:48;  Start 5/15

/20 at 22:00;  Stop 5/16/20 at 21:59;  Status DC


Potassium Chloride 90 meq/ Magnesium Sulfate 20 meq/ Multivitamins 10 ml/Chromi

um/ Copper/Manganese/ Seleni/Zn 1 ml/ Insulin Human Regular 15 unit/ Total 

Parenteral Nutrition/Amino Acids/Dextrose/ Fat Emulsion Intravenous 1,890 ml @  

78.75 mls/ hr TPN  CONT IV  Last administered on 5/16/20at 22:15;  Start 5/16/20

at 22:00;  Stop 5/17/20 at 21:59;  Status DC


Linezolid/Dextrose 300 ml @  300 mls/hr Q12HR IV  Last administered on 5/19/20at

21:08;  Start 5/17/20 at 09:00;  Stop 5/20/20 at 08:11;  Status DC


Daptomycin 450 mg/ Sodium Chloride 50 ml @  100 mls/hr Q24H IV  Last 

administered on 5/20/20at 09:25;  Start 5/17/20 at 09:00;  Stop 5/21/20 at 

08:30;  Status DC


Potassium Chloride 90 meq/ Magnesium Sulfate 20 meq/ Multivitamins 10 

ml/Chromium/ Copper/Manganese/ Seleni/Zn 1 ml/ Insulin Human Regular 15 unit/ 

Total Parenteral Nutrition/Amino Acids/Dextrose/ Fat Emulsion Intravenous 1,890 

ml @  78.75 mls/ hr TPN  CONT IV  Last administered on 5/17/20at 21:34;  Start 

5/17/20 at 22:00;  Stop 5/18/20 at 21:59;  Status DC


Lorazepam (Ativan Inj) 2 mg STK-MED ONCE .ROUTE ;  Start 5/17/20 at 14:58;  Stop

5/17/20 at 14:58;  Status DC


Metoprolol Tartrate (Lopressor Vial) 5 mg 1X  ONCE IVP  Last administered on 

5/17/20at 15:31;  Start 5/17/20 at 15:15;  Stop 5/17/20 at 15:16;  Status DC


Lorazepam (Ativan Inj) 2 mg 1X  ONCE IVP  Last administered on 5/17/20at 15:30; 

Start 5/17/20 at 15:15;  Stop 5/17/20 at 15:16;  Status DC


Enoxaparin Sodium (Lovenox 40mg Syringe) 40 mg Q24H SQ  Last administered on 

6/5/20at 17:44;  Start 5/17/20 at 17:00;  Stop 6/7/20 at 06:50;  Status DC


Lorazepam (Ativan Inj) 1 mg PRN Q4HRS  PRN IVP ANXIETY / AGITATION MILD-MOD Last

administered on 5/31/20at 15:55;  Start 5/17/20 at 19:15;  Stop 6/2/20 at 11:45;

 Status DC


Lorazepam (Ativan Inj) 2 mg PRN Q4HRS  PRN IVP ANXIETY / AGITATION SEVERE Last 

administered on 6/1/20at 07:55;  Start 5/17/20 at 19:15;  Stop 6/2/20 at 11:45; 

Status DC


Fentanyl Citrate (Fentanyl 2ml Vial) 50 mcg PRN Q4HRS  PRN IVP SEVERE PAIN Last 

administered on 6/13/20at 05:15;  Start 5/18/20 at 13:15;  Stop 6/14/20 at 

09:29;  Status DC


Fentanyl Citrate (Fentanyl 2ml Vial) 25 mcg PRN Q4HRS  PRN IVP MODERATE PAIN 

Last administered on 6/13/20at 00:27;  Start 5/18/20 at 13:15;  Stop 6/14/20 at 

09:30;  Status DC


Potassium Chloride 90 meq/ Magnesium Sulfate 20 meq/ Multivitamins 10 

ml/Chromium/ Copper/Manganese/ Seleni/Zn 1 ml/ Insulin Human Regular 15 unit/ 

Total Parenteral Nutrition/Amino Acids/Dextrose/ Fat Emulsion Intravenous 1,890 

ml @  78.75 mls/ hr TPN  CONT IV  Last administered on 5/18/20at 22:18;  Start 

5/18/20 at 22:00;  Stop 5/19/20 at 21:59;  Status DC


Furosemide (Lasix) 40 mg 1X  ONCE IVP  Last administered on 5/18/20at 21:51;  

Start 5/18/20 at 21:45;  Stop 5/18/20 at 21:48;  Status DC


Albumin Human 100 ml @  100 mls/hr 1X PRN  PRN IV SEE COMMENTS;  Start 5/19/20 

at 01:30;  Stop 8/3/20 at 09:52;  Status DC


Furosemide (Lasix) 40 mg [id number] IVP  Last administered on 6/3/20at 08:04;  Start 

5/19/20 at 14:00;  Stop 6/3/20 at 13:07;  Status DC


Potassium Chloride 90 meq/ Magnesium Sulfate 20 meq/ Multivitamins 10 

ml/Chromium/ Copper/Manganese/ Seleni/Zn 1 ml/ Insulin Human Regular 15 unit/ 

Total Parenteral Nutrition/Amino Acids/Dextrose/ Fat Emulsion Intravenous 1,800 

ml @  75 mls/hr TPN  CONT IV  Last administered on 5/19/20at 22:31;  Start 

5/19/20 at 22:00;  Stop 5/20/20 at 21:59;  Status DC


Potassium Chloride 90 meq/ Magnesium Sulfate 20 meq/ Multivitamins 10 

ml/Chromium/ Copper/Manganese/ Seleni/Zn 1 ml/ Insulin Human Regular 15 unit/ To

chely Parenteral Nutrition/Amino Acids/Dextrose/ Fat Emulsion Intravenous 1,800 ml

@  75 mls/hr TPN  CONT IV  Last administered on 5/20/20at 22:28;  Start 5/20/20 

at 22:00;  Stop 5/21/20 at 21:59;  Status DC


Potassium Chloride 110 meq/ Magnesium Sulfate 20 meq/ Multivitamins 10 

ml/Chromium/ Copper/Manganese/ Seleni/Zn 1 ml/ Insulin Human Regular 15 unit/ 

Total Parenteral Nutrition/Amino Acids/Dextrose/ Fat Emulsion Intravenous 1,800 

ml @  75 mls/hr TPN  CONT IV  Last administered on 5/21/20at 22:01;  Start 5/ 21/20 at 22:00;  Stop 5/22/20 at 21:59;  Status DC


Saliva Substitute (Biotene Moisturizing Mouth) 2 spray PRN Q15MIN  PRN PO DRY 

MOUTH;  Start 5/21/20 at 11:00


Potassium Chloride 110 meq/ Magnesium Sulfate 20 meq/ Multivitamins 10 

ml/Chromium/ Copper/Manganese/ Seleni/Zn 1 ml/ Insulin Human Regular 15 unit/ 

Total Parenteral Nutrition/Amino Acids/Dextrose/ Fat Emulsion Intravenous 1,800 

ml @  75 mls/hr TPN  CONT IV  Last administered on 5/22/20at 22:21;  Start 

5/22/20 at 22:00;  Stop 5/23/20 at 21:59;  Status DC


Potassium Chloride 110 meq/ Magnesium Sulfate 20 meq/ Multivitamins 10 

ml/Chromium/ Copper/Manganese/ Seleni/Zn 1 ml/ Insulin Human Regular 15 unit/ 

Total Parenteral Nutrition/Amino Acids/Dextrose/ Fat Emulsion Intravenous 1,800 

ml @  75 mls/hr TPN  CONT IV  Last administered on 5/23/20at 22:04;  Start 

5/23/20 at 22:00;  Stop 5/24/20 at 21:59;  Status DC


Potassium Chloride 110 meq/ Magnesium Sulfate 20 meq/ Multivitamins 10 

ml/Chromium/ Copper/Manganese/ Seleni/Zn 1 ml/ Insulin Human Regular 15 unit/ T

otal Parenteral Nutrition/Amino Acids/Dextrose/ Fat Emulsion Intravenous 1,800 

ml @  75 mls/hr TPN  CONT IV  Last administered on 5/24/20at 22:48;  Start 

5/24/20 at 22:00;  Stop 5/25/20 at 21:59;  Status DC


Potassium Chloride 70 meq/ Magnesium Sulfate 20 meq/ Multivitamins 10 

ml/Chromium/ Copper/Manganese/ Seleni/Zn 1 ml/ Insulin Human Regular 15 unit/ 

Total Parenteral Nutrition/Amino Acids/Dextrose/ Fat Emulsion Intravenous 1,800 

ml @  75 mls/hr TPN  CONT IV  Last administered on 5/25/20at 21:39;  Start 5/ 25/20 at 22:00;  Stop 5/26/20 at 21:59;  Status DC


Meropenem 500 mg/ Sodium Chloride 50 ml @  100 mls/hr Q6HRS IV  Last administ

ered on 5/27/20at 06:02;  Start 5/25/20 at 18:00;  Stop 5/27/20 at 09:59;  

Status DC


Barium Sulfate (Varibar Thin Liquid Apple) 148 gm 1X  ONCE PO ;  Start 5/26/20 

at 11:45;  Stop 5/26/20 at 11:49;  Status DC


Potassium Chloride 70 meq/ Magnesium Sulfate 20 meq/ Multivitamins 10 

ml/Chromium/ Copper/Manganese/ Seleni/Zn 1 ml/ Insulin Human Regular 15 unit/ 

Total Parenteral Nutrition/Amino Acids/Dextrose/ Fat Emulsion Intravenous 1,800 

ml @  75 mls/hr TPN  CONT IV  Last administered on 5/26/20at 22:27;  Start 

5/26/20 at 22:00;  Stop 5/27/20 at 21:59;  Status DC


Piperacillin Sod/ Tazobactam Sod 3.375 gm/Sodium Chloride 50 ml @  100 mls/hr 

Q6HRS IV  Last administered on 6/4/20at 06:10;  Start 5/27/20 at 12:00;  Stop 

6/4/20 at 07:26;  Status DC


Potassium Chloride 70 meq/ Magnesium Sulfate 20 meq/ Multivitamins 10 

ml/Chromium/ Copper/Manganese/ Seleni/Zn 1 ml/ Insulin Human Regular 15 unit/ 

Total Parenteral Nutrition/Amino Acids/Dextrose/ Fat Emulsion Intravenous 1,800 

ml @  75 mls/hr TPN  CONT IV  Last administered on 5/27/20at 22:03;  Start 

5/27/20 at 22:00;  Stop 5/28/20 at 21:59;  Status DC


Potassium Chloride 70 meq/ Magnesium Sulfate 20 meq/ Multivitamins 10 

ml/Chromium/ Copper/Manganese/ Seleni/Zn 1 ml/ Insulin Human Regular 15 unit/ 

Total Parenteral Nutrition/Amino Acids/Dextrose/ Fat Emulsion Intravenous 1,800 

ml @  75 mls/hr TPN  CONT IV  Last administered on 5/28/20at 22:33;  Start 

5/28/20 at 22:00;  Stop 5/29/20 at 21:59;  Status DC


Potassium Chloride 70 meq/ Magnesium Sulfate 20 meq/ Multivitamins 10 ml/Ch

romium/ Copper/Manganese/ Seleni/Zn 1 ml/ Insulin Human Regular 15 unit/ Total 

Parenteral Nutrition/Amino Acids/Dextrose/ Fat Emulsion Intravenous 1,800 ml @  

75 mls/hr TPN  CONT IV  Last administered on 5/29/20at 23:13;  Start 5/29/20 at 

22:00;  Stop 5/30/20 at 21:59;  Status DC


Potassium Chloride 80 meq/ Magnesium Sulfate 20 meq/ Multivitamins 10 

ml/Chromium/ Copper/Manganese/ Seleni/Zn 1 ml/ Insulin Human Regular 15 unit/ 

Total Parenteral Nutrition/Amino Acids/Dextrose/ Fat Emulsion Intravenous 1,800 

ml @  75 mls/hr TPN  CONT IV  Last administered on 5/30/20at 22:30;  Start 

5/30/20 at 22:00;  Stop 5/31/20 at 21:59;  Status DC


Potassium Chloride 80 meq/ Magnesium Sulfate 20 meq/ Multivitamins 10 

ml/Chromium/ Copper/Manganese/ Seleni/Zn 1 ml/ Insulin Human Regular 15 unit/ 

Total Parenteral Nutrition/Amino Acids/Dextrose/ Fat Emulsion Intravenous 1,800 

ml @  75 mls/hr TPN  CONT IV  Last administered on 5/31/20at 21:54;  Start 

5/31/20 at 22:00;  Stop 6/1/20 at 21:59;  Status DC


Potassium Chloride/Water 100 ml @  100 mls/hr 1X  ONCE IV  Last administered on 

6/1/20at 10:15;  Start 6/1/20 at 10:00;  Stop 6/1/20 at 10:59;  Status DC


Potassium Chloride 90 meq/ Magnesium Sulfate 20 meq/ Multivitamins 10 

ml/Chromium/ Copper/Manganese/ Seleni/Zn 1 ml/ Insulin Human Regular 20 unit/ 

Total Parenteral Nutrition/Amino Acids/Dextrose/ Fat Emulsion Intravenous 1,800 

ml @  75 mls/hr TPN  CONT IV  Last administered on 6/1/20at 22:28;  Start 6/1/20

at 22:00;  Stop 6/2/20 at 21:59;  Status DC


Potassium Chloride 90 meq/ Magnesium Sulfate 20 meq/ Multivitamins 10 

ml/Chromium/ Copper/Manganese/ Seleni/Zn 1 ml/ Insulin Human Regular 20 unit/ 

Total Parenteral Nutrition/Amino Acids/Dextrose/ Fat Emulsion Intravenous 1,800 

ml @  75 mls/hr TPN  CONT IV  Last administered on 6/2/20at 22:08;  Start 6/2/20

at 22:00;  Stop 6/3/20 at 21:59;  Status DC


Lorazepam (Ativan Inj) 0.25 mg PRN Q4HRS  PRN IVP ANXIETY / AGITATION Last 

administered on 8/13/20at 15:56;  Start 6/3/20 at 07:30


Potassium Chloride 90 meq/ Magnesium Sulfate 20 meq/ Multivitamins 10 

ml/Chromium/ Copper/Manganese/ Seleni/Zn 1 ml/ Insulin Human Regular 20 unit/ 

Total Parenteral Nutrition/Amino Acids/Dextrose/ Fat Emulsion Intravenous 1,800 

ml @  75 mls/hr TPN  CONT IV  Last administered on 6/3/20at 23:13;  Start 6/3/20

at 22:00;  Stop 6/4/20 at 21:59;  Status DC


Furosemide (Lasix) 40 mg DAILY IVP  Last administered on 6/5/20at 11:14;  Start 

6/3/20 at 13:30;  Stop 6/7/20 at 09:12;  Status DC


Fluoxetine HCl (PROzac) 20 mg QHS PEG  Last administered on 8/14/20at 00:32;  

Start 6/4/20 at 21:00


Fentanyl (Duragesic 50mcg/ Hr Patch) 1 patch Q72H TD  Last administered on 

6/4/20at 21:22;  Start 6/4/20 at 21:00;  Stop 6/13/20 at 12:00;  Status DC


Potassium Chloride 40 meq/ Potassium Acetate 60 meq/Magnesium Sulfate 10 meq/ 

Multivitamins 10 ml/Chromium/ Copper/Manganese/ Seleni/Zn 1 ml/ Insulin Human 

Regular 20 unit/ Total Parenteral Nutrition/Amino Acids/Dextrose/ Fat Emulsion 

Intravenous 1,800 ml @  75 mls/hr TPN  CONT IV  Last administered on 6/5/20at 

00:03;  Start 6/4/20 at 22:00;  Stop 6/5/20 at 21:59;  Status DC


Potassium Acetate 80 meq/Magnesium Sulfate 5 meq/ Multivitamins 10 ml/Chromium/ 

Copper/Manganese/ Seleni/Zn 1 ml/ Insulin Human Regular 20 unit/ Total 

Parenteral Nutrition/Amino Acids/Dextrose/ Fat Emulsion Intravenous 1,920 ml @  

80 mls/hr TPN  CONT IV  Last administered on 6/5/20at 21:59;  Start 6/5/20 at 

22:00;  Stop 6/6/20 at 21:59;  Status DC


Potassium Acetate 60 meq/Magnesium Sulfate 5 meq/ Multivitamins 10 ml/Chromium/ 

Copper/Manganese/ Seleni/Zn 1 ml/ Insulin Human Regular 30 unit/ Total 

Parenteral Nutrition/Amino Acids/Dextrose/ Fat Emulsion Intravenous 1,920 ml @  

80 mls/hr TPN  CONT IV  Last administered on 6/6/20at 21:54;  Start 6/6/20 at 

22:00;  Stop 6/7/20 at 21:59;  Status DC


Norepinephrine Bitartrate 8 mg/ Dextrose 258 ml @  13.332 mls/ hr CONT  PRN IV 

PER PROTOCOL Last administered on 7/2/20at 09:09;  Start 6/7/20 at 06:30;  Stop 

8/3/20 at 09:45;  Status DC


Albumin Human 500 ml @  125 mls/hr 1X  ONCE IV  Last administered on 6/7/20at 

08:10;  Start 6/7/20 at 08:15;  Stop 6/7/20 at 12:14;  Status DC


Potassium Acetate 40 meq/Magnesium Sulfate 5 meq/ Multivitamins 10 ml/Chromium/ 

Copper/Manganese/ Seleni/Zn 1 ml/ Insulin Human Regular 30 unit/ Total 

Parenteral Nutrition/Amino Acids/Dextrose/ Fat Emulsion Intravenous 1,920 ml @  

80 mls/hr TPN  CONT IV  Last administered on 6/7/20at 22:23;  Start 6/7/20 at 

22:00;  Stop 6/8/20 at 21:59;  Status DC


Meropenem 1 gm/ Sodium Chloride 100 ml @  200 mls/hr Q8HRS IV ;  Start 6/7/20 at

14:00;  Status Cancel


Meropenem 1 gm/ Sodium Chloride 100 ml @  200 mls/hr Q8HRS IV  Last administered

on 6/7/20at 11:04;  Start 6/7/20 at 10:00;  Stop 6/7/20 at 13:00;  Status DC


Meropenem 1 gm/ Sodium Chloride 100 ml @  200 mls/hr Q12HR IV  Last administered

on 6/25/20at 08:27;  Start 6/7/20 at 21:00;  Stop 6/25/20 at 08:56;  Status DC


Sodium Chloride 1,000 ml @  1,000 mls/hr 1X  ONCE IV  Last administered on 

6/7/20at 11:06;  Start 6/7/20 at 10:45;  Stop 6/7/20 at 11:44;  Status DC


Micafungin Sodium 100 mg/Dextrose 100 ml @  100 mls/hr Q24H IV  Last admini

stered on 6/24/20at 12:34;  Start 6/7/20 at 11:00;  Stop 6/25/20 at 08:56;  

Status DC


Daptomycin 410 mg/ Sodium Chloride 50 ml @  100 mls/hr Q24H IV  Last 

administered on 6/9/20at 13:33;  Start 6/7/20 at 14:00;  Stop 6/10/20 at 08:30; 

Status DC


Midazolam HCl (Versed) 2 mg STK-MED ONCE .ROUTE ;  Start 6/7/20 at 14:47;  Stop 

6/7/20 at 14:48;  Status DC


Fentanyl Citrate (Fentanyl 2ml Vial) 100 mcg STK-MED ONCE .ROUTE ;  Start 6/7/20

at 14:47;  Stop 6/7/20 at 14:48;  Status DC


Flumazenil (Romazicon) 0.5 mg STK-MED ONCE IV ;  Start 6/7/20 at 14:48;  Stop 

6/7/20 at 14:48;  Status DC


Naloxone HCl (Narcan) 0.4 mg STK-MED ONCE .ROUTE ;  Start 6/7/20 at 14:48;  Stop

6/7/20 at 14:48;  Status DC


Lidocaine HCl (Lidocaine 1% 20ml Vial) 20 ml STK-MED ONCE .ROUTE ;  Start 6/7/20

at 14:48;  Stop 6/7/20 at 14:48;  Status DC


Midazolam HCl (Versed) 2 mg 1X  ONCE IV  Last administered on 6/7/20at 15:28;  

Start 6/7/20 at 15:00;  Stop 6/7/20 at 15:01;  Status DC


Fentanyl Citrate (Fentanyl 2ml Vial) 100 mcg 1X  ONCE IV  Last administered on 

6/7/20at 15:28;  Start 6/7/20 at 15:00;  Stop 6/7/20 at 15:01;  Status DC


Lidocaine HCl (Lidocaine 1% 20ml Vial) 20 ml 1X  ONCE INJ  Last administered on 

6/7/20at 15:30;  Start 6/7/20 at 15:00;  Stop 6/7/20 at 15:01;  Status DC


Sodium Chloride 1,000 ml @  100 mls/hr Q10H IV  Last administered on 6/16/20at 

07:30;  Start 6/7/20 at 20:00;  Stop 6/16/20 at 11:26;  Status DC


Sodium Bicarbonate (Sodium Bicarb Adult 8.4% Syr) 50 meq 1X  ONCE IV  Last 

administered on 6/7/20at 21:47;  Start 6/7/20 at 22:00;  Stop 6/7/20 at 22:01;  

Status DC


Potassium Acetate 40 meq/Magnesium Sulfate 5 meq/ Multivitamins 10 ml/Chromium/ 

Copper/Manganese/ Seleni/Zn 1 ml/ Insulin Human Regular 30 unit/ Total 

Parenteral Nutrition/Amino Acids/Dextrose/ Fat Emulsion Intravenous 1,920 ml @  

80 mls/hr TPN  CONT IV  Last administered on 6/8/20at 22:28;  Start 6/8/20 at 

22:00;  Stop 6/9/20 at 21:59;  Status DC


Sodium Chloride 500 ml @  500 mls/hr 1X  ONCE IV  Last administered on 6/9/20at 

06:39;  Start 6/9/20 at 06:45;  Stop 6/9/20 at 07:44;  Status DC


Potassium Acetate 40 meq/Magnesium Sulfate 5 meq/ Multivitamins 10 ml/Chromium/ 

Copper/Manganese/ Seleni/Zn 1 ml/ Insulin Human Regular 30 unit/ Total 

Parenteral Nutrition/Amino Acids/Dextrose/ Fat Emulsion Intravenous 1,920 ml @  

80 mls/hr TPN  CONT IV  Last administered on 6/9/20at 22:03;  Start 6/9/20 at 

22:00;  Stop 6/10/20 at 21:59;  Status DC


Metoprolol Tartrate (Lopressor Vial) 5 mg PRN Q6HRS  PRN IVP HYPERTENSION Last 

administered on 8/11/20at 10:02;  Start 6/10/20 at 09:00


Potassium Acetate 40 meq/Magnesium Sulfate 5 meq/ Multivitamins 10 ml/Chromium/ 

Copper/Manganese/ Seleni/Zn 1 ml/ Insulin Human Regular 30 unit/ Total 

Parenteral Nutrition/Amino Acids/Dextrose/ Fat Emulsion Intravenous 1,920 ml @  

80 mls/hr TPN  CONT IV  Last administered on 6/10/20at 21:26;  Start 6/10/20 at 

22:00;  Stop 6/11/20 at 21:59;  Status DC


Potassium Acetate 40 meq/Magnesium Sulfate 5 meq/ Multivitamins 10 ml/Chromium/ 

Copper/Manganese/ Seleni/Zn 1 ml/ Insulin Human Regular 30 unit/ Total 

Parenteral Nutrition/Amino Acids/Dextrose/ Fat Emulsion Intravenous 1,920 ml @  

80 mls/hr TPN  CONT IV  Last administered on 6/11/20at 23:23;  Start 6/11/20 at 

22:00;  Stop 6/12/20 at 21:59;  Status DC


Potassium Acetate 40 meq/Magnesium Sulfate 5 meq/ Multivitamins 10 ml/Chromium/ 

Copper/Manganese/ Seleni/Zn 1 ml/ Insulin Human Regular 30 unit/ Total 

Parenteral Nutrition/Amino Acids/Dextrose/ Fat Emulsion Intravenous 1,920 ml @  

80 mls/hr TPN  CONT IV  Last administered on 6/12/20at 21:35;  Start 6/12/20 at 

22:00;  Stop 6/13/20 at 21:59;  Status DC


Furosemide (Lasix) 20 mg 1X  ONCE IVP  Last administered on 6/13/20at 06:26;  

Start 6/13/20 at 06:15;  Stop 6/13/20 at 06:16;  Status DC


Methylprednisolone Sodium Succinate (SOLU-Medrol 125MG VIAL) 125 mg 1X  ONCE IV 

Last administered on 6/13/20at 06:26;  Start 6/13/20 at 06:15;  Stop 6/13/20 at 

06:16;  Status DC


Albuterol/ Ipratropium (Duoneb) 3 ml Q4HRS NEB  Last administered on 8/14/20at 

12:10;  Start 6/13/20 at 08:00


Fentanyl Citrate 30 ml @ 0 mls/hr CONT  PRN IV SEE PROTOCOL Last administered on

7/4/20at 08:03;  Start 6/13/20 at 06:00;  Stop 7/4/20 at 12:42;  Status DC


Propofol 100 ml @ 0 mls/hr CONT  PRN IV SEE PROTOCOL Last administered on 

6/20/20at 23:50;  Start 6/13/20 at 06:00;  Stop 8/3/20 at 09:45;  Status DC


Fentanyl Citrate (Fentanyl 2ml Vial) 25 mcg PRN Q1HR  PRN IV SEE COMMENTS Last 

administered on 8/1/20at 23:56;  Start 6/13/20 at 06:00;  Stop 8/3/20 at 09:45; 

Status DC


Fentanyl Citrate (Fentanyl 2ml Vial) 50 mcg PRN Q1HR  PRN IV SEE COMMENTS Last 

administered on 8/3/20at 09:39;  Start 6/13/20 at 06:00;  Stop 8/3/20 at 09:45; 

Status DC


Chlorhexidine Gluconate (Peridex) 15 ml BID MM ;  Start 6/13/20 at 09:00;  Stop 

6/13/20 at 07:58;  Status DC


Potassium Acetate 40 meq/Magnesium Sulfate 5 meq/ Multivitamins 10 ml/Chromium/ 

Copper/Manganese/ Seleni/Zn 1 ml/ Insulin Human Regular 30 unit/ Total 

Parenteral Nutrition/Amino Acids/Dextrose/ Fat Emulsion Intravenous 1,920 ml @  

80 mls/hr TPN  CONT IV  Last administered on 6/13/20at 21:19;  Start 6/13/20 at 

22:00;  Stop 6/14/20 at 21:59;  Status DC


Acetylcysteine (Mucomyst 20% Resp Treatment) 600 mg BID NEB  Last administered 

on 6/19/20at 09:33;  Start 6/13/20 at 21:00;  Stop 6/19/20 at 10:39;  Status DC


Magnesium Sulfate 100 ml @  25 mls/hr 1X  ONCE IV  Last administered on 

6/13/20at 15:48;  Start 6/13/20 at 15:45;  Stop 6/13/20 at 19:44;  Status DC


Potassium Acetate 40 meq/Magnesium Sulfate 5 meq/ Multivitamins 10 ml/Chromium/ 

Copper/Manganese/ Seleni/Zn 1 ml/ Insulin Human Regular 30 unit/ Total 

Parenteral Nutrition/Amino Acids/Dextrose/ Fat Emulsion Intravenous 1,920 ml @  

80 mls/hr TPN  CONT IV  Last administered on 6/14/20at 21:35;  Start 6/14/20 at 

22:00;  Stop 6/15/20 at 21:59;  Status DC


Potassium Chloride/Water 100 ml @  100 mls/hr Q1H IV  Last administered on 6/15/

20at 08:31;  Start 6/15/20 at 07:00;  Stop 6/15/20 at 08:59;  Status DC


Potassium Acetate 40 meq/Magnesium Sulfate 5 meq/ Multivitamins 10 ml/Chromium/ 

Copper/Manganese/ Seleni/Zn 1 ml/ Insulin Human Regular 30 unit/ Total 

Parenteral Nutrition/Amino Acids/Dextrose/ Fat Emulsion Intravenous 1,920 ml @  

80 mls/hr TPN  CONT IV  Last administered on 6/15/20at 21:54;  Start 6/15/20 at 

22:00;  Stop 6/16/20 at 19:34;  Status DC


Lidocaine HCl (Buffered Lidocaine 1%) 3 ml STK-MED ONCE .ROUTE ;  Start 6/15/20 

at 12:14;  Stop 6/15/20 at 12:14;  Status DC


Lidocaine HCl (Buffered Lidocaine 1%) 3 ml 1X  ONCE IJ  Last administered on 

6/15/20at 13:11;  Start 6/15/20 at 13:00;  Stop 6/15/20 at 13:01;  Status DC


Magnesium Sulfate 50 ml @ 25 mls/hr 1X  ONCE IV ;  Start 6/16/20 at 08:15;  Stop

6/16/20 at 10:14;  Status DC


Potassium Acetate 40 meq/Magnesium Sulfate 10 meq/ Multivitamins 10 ml/Chromium/

Copper/Manganese/ Seleni/Zn 1 ml/ Insulin Human Regular 20 unit/ Total 

Parenteral Nutrition/Amino Acids/Dextrose/ Fat Emulsion Intravenous 1,920 ml @  

80 mls/hr TPN  CONT IV  Last administered on 6/16/20at 21:32;  Start 6/16/20 at 

22:00;  Stop 6/17/20 at 21:59;  Status DC


Potassium Chloride/Water 100 ml @  100 mls/hr Q1H IV  Last administered on 

6/17/20at 09:12;  Start 6/17/20 at 08:00;  Stop 6/17/20 at 09:59;  Status DC


Alteplase, Recombinant (Cathflo For Central Catheter Clearance) 4 mg 1X  ONCE 

INT CAT ;  Start 6/17/20 at 09:15;  Stop 6/17/20 at 09:16;  Status UNV


Alteplase, Recombinant (Cathflo For Central Catheter Clearance) 4 mg 1X  ONCE 

INT CAT ;  Start 6/17/20 at 09:15;  Stop 6/17/20 at 09:16;  Status UNV


Alteplase, Recombinant (Cathflo For Central Catheter Clearance) 4 mg 1X  ONCE 

INT CAT ;  Start 6/17/20 at 09:15;  Stop 6/17/20 at 09:16;  Status UNV


Alteplase, Recombinant 4 mg/ Sodium Chloride 20 ml @ 20 mls/hr 1X  ONCE IV  Last

administered on 6/17/20at 10:10;  Start 6/17/20 at 10:00;  Stop 6/17/20 at 

10:59;  Status DC


Alteplase, Recombinant 4 mg/ Sodium Chloride 20 ml @ 20 mls/hr 1X  ONCE IV  Last

administered on 6/17/20at 10:09;  Start 6/17/20 at 10:00;  Stop 6/17/20 at 

10:59;  Status DC


Alteplase, Recombinant 4 mg/ Sodium Chloride 20 ml @ 20 mls/hr 1X  ONCE IV  Last

administered on 6/17/20at 10:09;  Start 6/17/20 at 10:00;  Stop 6/17/20 at 

10:59;  Status DC


Potassium Acetate 60 meq/Magnesium Sulfate 10 meq/ Multivitamins 10 ml/Chromium/

Copper/Manganese/ Seleni/Zn 1 ml/ Insulin Human Regular 20 unit/ Total 

Parenteral Nutrition/Amino Acids/Dextrose/ Fat Emulsion Intravenous 1,920 ml @  

80 mls/hr TPN  CONT IV  Last administered on 6/17/20at 21:55;  Start 6/17/20 at 

22:00;  Stop 6/18/20 at 21:59;  Status DC


Albumin Human 500 ml @  125 mls/hr 1X  ONCE IV  Last administered on 6/18/20at 

12:01;  Start 6/18/20 at 11:15;  Stop 6/18/20 at 15:14;  Status DC


Sodium Chloride 500 ml @  500 mls/hr 1X  ONCE IV  Last administered on 6/18/20at

13:50;  Start 6/18/20 at 11:15;  Stop 6/18/20 at 12:14;  Status DC


Potassium Acetate 60 meq/Magnesium Sulfate 14 meq/ Multivitamins 10 ml/Chromium/

Copper/Manganese/ Seleni/Zn 1 ml/ Insulin Human Regular 20 unit/ Total 

Parenteral Nutrition/Amino Acids/Dextrose/ Fat Emulsion Intravenous 1,920 ml @  

80 mls/hr TPN  CONT IV  Last administered on 6/18/20at 22:26;  Start 6/18/20 at 

22:00;  Stop 6/19/20 at 21:59;  Status DC


Ciprofloxacin/ Dextrose 200 ml @  200 mls/hr Q12HR IV  Last administered on 

6/25/20at 08:27;  Start 6/18/20 at 21:00;  Stop 6/25/20 at 08:56;  Status DC


Albumin Human 250 ml @  62.5 mls/hr 1X  ONCE IV  Last administered on 6/19/20at 

11:09;  Start 6/19/20 at 11:00;  Stop 6/19/20 at 14:59;  Status DC


Furosemide (Lasix) 20 mg 1X  ONCE IVP  Last administered on 6/19/20at 14:52;  

Start 6/19/20 at 10:45;  Stop 6/19/20 at 10:49;  Status DC


Potassium Acetate 60 meq/Magnesium Sulfate 14 meq/ Multivitamins 10 ml/Chromium/

Copper/Manganese/ Seleni/Zn 1 ml/ Insulin Human Regular 15 unit/ Total 

Parenteral Nutrition/Amino Acids/Dextrose/ Fat Emulsion Intravenous 1,920 ml @  

80 mls/hr TPN  CONT IV  Last administered on 6/19/20at 22:08;  Start 6/19/20 at 

22:00;  Stop 6/20/20 at 21:59;  Status DC


Potassium Acetate 60 meq/Magnesium Sulfate 14 meq/ Multivitamins 10 ml/Chromium/

Copper/Manganese/ Seleni/Zn 1 ml/ Insulin Human Regular 15 unit/ Total 

Parenteral Nutrition/Amino Acids/Dextrose/ Fat Emulsion Intravenous 1,920 ml @  

80 mls/hr TPN  CONT IV  Last administered on 6/20/20at 22:12;  Start 6/20/20 at 

22:00;  Stop 6/21/20 at 21:59;  Status DC


Potassium Acetate 60 meq/Magnesium Sulfate 14 meq/ Multivitamins 10 ml/Chromium/

Copper/Manganese/ Seleni/Zn 1 ml/ Insulin Human Regular 15 unit/ Total 

Parenteral Nutrition/Amino Acids/Dextrose/ Fat Emulsion Intravenous 1,920 ml @  

80 mls/hr TPN  CONT IV  Last administered on 6/21/20at 22:22;  Start 6/21/20 at 

22:00;  Stop 6/22/20 at 21:59;  Status DC


Furosemide (Lasix) 20 mg 1X  ONCE IVP  Last administered on 6/22/20at 11:07;  

Start 6/22/20 at 10:30;  Stop 6/22/20 at 10:34;  Status DC


Potassium Acetate 60 meq/Magnesium Sulfate 14 meq/ Multivitamins 10 ml/Chromium/

Copper/Manganese/ Seleni/Zn 1 ml/ Insulin Human Regular 15 unit/ Sodium Chloride

20 meq/Total Parenteral Nutrition/Amino Acids/Dextrose/ Fat Emulsion Intravenous

1,920 ml @  80 mls/hr TPN  CONT IV  Last administered on 6/22/20at 21:54;  Start

6/22/20 at 22:00;  Stop 6/23/20 at 21:59;  Status DC


Potassium Acetate 30 meq/Magnesium Sulfate 14 meq/ Multivitamins 10 ml/Chromium/

Copper/Manganese/ Seleni/Zn 1 ml/ Insulin Human Regular 15 unit/ Sodium Chloride

20 meq/Potassium Chloride 30 meq/ Total Parenteral Nutrition/Amino 

Acids/Dextrose/ Fat Emulsion Intravenous 1,920 ml @  80 mls/hr TPN  CONT IV  

Last administered on 6/23/20at 21:46;  Start 6/23/20 at 22:00;  Stop 6/24/20 at 

21:59;  Status DC


Sodium Chloride 80 meq/Potassium Chloride 30 meq/ Potassium Acetate 30 

meq/Magnesium Sulfate 14 meq/ Multivitamins 10 ml/Chromium/ Copper/Manganese/ 

Seleni/Zn 1 ml/ Insulin Human Regular 15 unit/ Total Parenteral Nutrition/Amino 

Acids/Dextrose/ Fat Emulsion Intravenous 1,920 ml @  80 mls/hr TPN  CONT IV  La

st administered on 6/24/20at 22:33;  Start 6/24/20 at 22:00;  Stop 6/25/20 at 

21:59;  Status DC


Furosemide (Lasix) 40 mg 1X  ONCE IVP  Last administered on 6/24/20at 16:27;  

Start 6/24/20 at 15:30;  Stop 6/24/20 at 15:33;  Status DC


Albumin Human 250 ml @  62.5 mls/hr 1X  ONCE IV  Last administered on 6/24/20at 

16:27;  Start 6/24/20 at 15:30;  Stop 6/24/20 at 19:29;  Status DC


Sodium Chloride 80 meq/Potassium Chloride 30 meq/ Potassium Acetate 30 

meq/Magnesium Sulfate 14 meq/ Multivitamins 10 ml/Chromium/ Copper/Manganese/ 

Seleni/Zn 1 ml/ Insulin Human Regular 15 unit/ Total Parenteral Nutrition/Amino 

Acids/Dextrose/ Fat Emulsion Intravenous 1,920 ml @  80 mls/hr TPN  CONT IV  

Last administered on 6/25/20at 22:25;  Start 6/25/20 at 22:00;  Stop 6/26/20 at 

21:59;  Status DC


Sodium Chloride 80 meq/Potassium Chloride 30 meq/ Potassium Acetate 30 

meq/Magnesium Sulfate 14 meq/ Multivitamins 10 ml/Chromium/ Copper/Manganese/ 

Seleni/Zn 1 ml/ Insulin Human Regular 15 unit/ Total Parenteral Nutrition/Amino 

Acids/Dextrose/ Fat Emulsion Intravenous 1,920 ml @  80 mls/hr TPN  CONT IV  

Last administered on 6/26/20at 21:32;  Start 6/26/20 at 22:00;  Stop 6/27/20 at 

21:59;  Status DC


Sodium Chloride 80 meq/Potassium Chloride 30 meq/ Potassium Acetate 30 

meq/Magnesium Sulfate 14 meq/ Multivitamins 10 ml/Chromium/ Copper/Manganese/ 

Seleni/Zn 1 ml/ Insulin Human Regular 15 unit/ Total Parenteral Nutrition/Amino 

Acids/Dextrose/ Fat Emulsion Intravenous 1,920 ml @  80 mls/hr TPN  CONT IV  

Last administered on 6/27/20at 21:53;  Start 6/27/20 at 22:00;  Stop 6/28/20 at 

21:59;  Status DC


Acetylcysteine (Mucomyst 20% Resp Treatment) 600 mg RTBID NEB  Last administered

on 8/14/20at 08:00;  Start 6/27/20 at 12:00


Sodium Chloride 80 meq/Potassium Chloride 30 meq/ Potassium Acetate 30 meq/

Magnesium Sulfate 14 meq/ Multivitamins 10 ml/Chromium/ Copper/Manganese/ 

Seleni/Zn 1 ml/ Insulin Human Regular 15 unit/ Total Parenteral Nutrition/Amino 

Acids/Dextrose/ Fat Emulsion Intravenous 1,920 ml @  80 mls/hr TPN  CONT IV  

Last administered on 6/28/20at 22:06;  Start 6/28/20 at 22:00;  Stop 6/29/20 at 

21:59;  Status DC


Meropenem 500 mg/ Sodium Chloride 50 ml @  100 mls/hr Q6HRS IV  Last 

administered on 7/21/20at 06:15;  Start 6/28/20 at 18:00;  Stop 7/21/20 at 

08:23;  Status DC


Daptomycin 500 mg/ Sodium Chloride 50 ml @  100 mls/hr Q24H IV  Last 

administered on 7/6/20at 21:47;  Start 6/28/20 at 19:00;  Stop 7/7/20 at 08:13; 

Status DC


Sodium Chloride 80 meq/Potassium Chloride 30 meq/ Potassium Acetate 30 

meq/Magnesium Sulfate 14 meq/ Multivitamins 10 ml/Chromium/ Copper/Manganese/ 

Seleni/Zn 1 ml/ Insulin Human Regular 15 unit/ Total Parenteral Nutrition/Amino 

Acids/Dextrose/ Fat Emulsion Intravenous 1,920 ml @  80 mls/hr TPN  CONT IV  

Last administered on 6/29/20at 22:09;  Start 6/29/20 at 22:00;  Stop 6/30/20 at 

21:59;  Status DC


Heparin Sodium (Porcine) 1000 unit/Sodium Chloride 1,001 ml @  1,001 mls/hr 1X  

ONCE IRR ;  Start 6/30/20 at 06:00;  Stop 6/30/20 at 06:59;  Status DC


Propofol (Diprivan) 200 mg STK-MED ONCE IV ;  Start 6/30/20 at 07:44;  Stop 

6/30/20 at 07:44;  Status DC


Lidocaine HCl (Lidocaine Pf 2% Vial) 5 ml STK-MED ONCE .ROUTE ;  Start 6/30/20 

at 07:44;  Stop 6/30/20 at 07:44;  Status DC


Fentanyl Citrate (Fentanyl 2ml Vial) 100 mcg STK-MED ONCE .ROUTE ;  Start 

6/30/20 at 07:44;  Stop 6/30/20 at 07:44;  Status DC


Rocuronium Bromide (Zemuron) 100 mg STK-MED ONCE .ROUTE ;  Start 6/30/20 at 

07:44;  Stop 6/30/20 at 07:44;  Status DC


Micafungin Sodium 100 mg/Dextrose 100 ml @  100 mls/hr Q24H IV  Last 

administered on 8/4/20at 09:30;  Start 6/30/20 at 08:30;  Stop 8/5/20 at 08:20; 

Status DC


Bupivacaine HCl/ Epinephrine Bitart (Sensorcain-Epi 0.5%-1:[id number] Mpf) 30 ml STK

-MED ONCE .ROUTE ;  Start 6/30/20 at 08:34;  Stop 6/30/20 at 08:35;  Status DC


Iohexol (Omnipaque 300 Mg/ml) 50 ml STK-MED ONCE .ROUTE  Last administered on 

6/30/20at 13:30;  Start 6/30/20 at 08:35;  Stop 6/30/20 at 08:35;  Status DC


Sodium Chloride 80 meq/Potassium Chloride 30 meq/ Potassium Acetate 30 

meq/Magnesium Sulfate 14 meq/ Multivitamins 10 ml/Chromium/ Copper/Manganese/ S

haley/Zn 1 ml/ Insulin Human Regular 15 unit/ Total Parenteral Nutrition/Amino 

Acids/Dextrose/ Fat Emulsion Intravenous 1,920 ml @  80 mls/hr TPN  CONT IV  

Last administered on 7/1/20at 01:22;  Start 6/30/20 at 22:00;  Stop 7/1/20 at 

21:59;  Status DC


Phenylephrine HCl (Ken-Synephrine Inj) 10 mg STK-MED ONCE .ROUTE ;  Start 

6/30/20 at 10:15;  Stop 6/30/20 at 10:15;  Status DC


Desflurane (Suprane) 90 ml STK-MED ONCE IH ;  Start 6/30/20 at 10:18;  Stop 

6/30/20 at 10:19;  Status DC


Albumin Human 500 ml @  As Directed STK-MED ONCE IV ;  Start 6/30/20 at 11:06;  

Stop 6/30/20 at 11:06;  Status DC


Vasopressin (Vasostrict) 20 unit STK-MED ONCE .ROUTE ;  Start 6/30/20 at 12:23; 

Stop 6/30/20 at 12:23;  Status DC


Phenylephrine HCl (Ken-Synephrine Inj) 10 mg STK-MED ONCE .ROUTE ;  Start 

6/30/20 at 13:33;  Stop 6/30/20 at 13:33;  Status DC


Phenylephrine HCl (Ken-Synephrine Inj) 10 mg STK-MED ONCE .ROUTE ;  Start 

6/30/20 at 13:33;  Stop 6/30/20 at 13:33;  Status DC


Ondansetron HCl (Zofran) 4 mg STK-MED ONCE .ROUTE ;  Start 6/30/20 at 13:33;  

Stop 6/30/20 at 13:33;  Status DC


Enoxaparin Sodium (Lovenox 40mg Syringe) 40 mg Q24H SQ  Last administered on 

8/12/20at 08:28;  Start 7/1/20 at 08:00


Sodium Chloride (Normal Saline Flush) 3 ml QSHIFT  PRN IV AFTER MEDS AND BLOOD 

DRAWS;  Start 6/30/20 at 14:45;  Stop 8/3/20 at 09:45;  Status DC


Naloxone HCl (Narcan) 0.4 mg PRN Q2MIN  PRN IV SEE INSTRUCTIONS;  Start 6/30/20 

at 14:45;  Stop 8/3/20 at 09:45;  Status DC


Sodium Chloride 1,000 ml @  25 mls/hr Q24H IV  Last administered on 8/2/20at 

20:55;  Start 6/30/20 at 14:33;  Stop 8/3/20 at 09:45;  Status DC


Morphine Sulfate (Morphine Sulfate) 1 mg PRN Q1HR  PRN IV PAIN Last administered

on 7/25/20at 18:28;  Start 6/30/20 at 14:45;  Stop 8/3/20 at 09:45;  Status DC


Midazolam HCl 100 mg/Sodium Chloride 100 ml @ 1 mls/hr CONT  PRN IV SEE I/O 

RECORD Last administered on 7/3/20at 18:48;  Start 6/30/20 at 14:45;  Stop 8

/3/20 at 09:45;  Status DC


Phenylephrine HCl (PHENYLEPHRINE in 0.9% NACL PF) 1 mg STK-MED ONCE IV ;  Start 

6/30/20 at 14:44;  Stop 6/30/20 at 14:45;  Status DC


Ephedrine Sulfate (ePHEDrine PF IN SALINE SYRINGE) 50 mg STK-MED ONCE IV ;  

Start 6/30/20 at 14:45;  Stop 6/30/20 at 14:45;  Status DC


Vasopressin 20 unit/Dextrose 101 ml @  12 mls/hr CONT  PRN IV SEE I/O RECORD 

Last administered on 7/7/20at 04:17;  Start 6/30/20 at 15:30;  Stop 8/3/20 at 

09:45;  Status DC


Sodium Chloride 1,000 ml @  1,000 mls/hr 1X  ONCE IV  Last administered on 

6/30/20at 15:42;  Start 6/30/20 at 15:45;  Stop 6/30/20 at 16:44;  Status DC


Albumin Human 500 ml @  125 mls/hr 1X  ONCE IV ;  Start 6/30/20 at 16:00;  Stop 

6/30/20 at 19:59;  Status DC


Albumin Human 500 ml @  125 mls/hr PRN Q1HR  PRN IV PER PROTOCOL;  Start 6/30/20

at 15:45;  Stop 8/3/20 at 09:52;  Status DC


Magnesium Sulfate 50 ml @ 25 mls/hr 1X  ONCE IV  Last administered on 6/30/20at 

17:02;  Start 6/30/20 at 16:30;  Stop 6/30/20 at 18:29;  Status DC


Sodium Bicarbonate (Sodium Bicarb Adult 8.4% Syr) 50 meq STK-MED ONCE .ROUTE ;  

Start 6/30/20 at 16:20;  Stop 6/30/20 at 16:20;  Status DC


Sodium Bicarbonate (Sodium Bicarb Adult 8.4% Syr) 100 meq 1X  ONCE IV  Last 

administered on 6/30/20at 17:07;  Start 6/30/20 at 16:30;  Stop 6/30/20 at 

16:31;  Status DC


Sodium Bicarbonate 150 meq/Dextrose 1,150 ml @  75 mls/hr 1X  ONCE IV  Last 

administered on 6/30/20at 20:02;  Start 6/30/20 at 16:30;  Stop 7/1/20 at 07:49;

 Status DC


Sodium Chloride 80 meq/Potassium Chloride 30 meq/ Potassium Acetate 30 

meq/Magnesium Sulfate 14 meq/ Multivitamins 10 ml/Chromium/ Copper/Manganese/ 

Seleni/Zn 1 ml/ Insulin Human Regular 15 unit/ Total Parenteral Nutrition/Amino 

Acids/Dextrose/ Fat Emulsion Intravenous 1,920 ml @  80 mls/hr TPN  CONT IV  

Last administered on 7/1/20at 23:05;  Start 7/1/20 at 22:00;  Stop 7/2/20 at 

21:59;  Status DC


Sodium Chloride 100 meq/Potassium Chloride 30 meq/ Potassium Acetate 30 

meq/Magnesium Sulfate 12 meq/ Multivitamins 10 ml/Chromium/ Copper/Manganese/ 

Seleni/Zn 1 ml/ Insulin Human Regular 15 unit/ Total Parenteral Nutrition/Amino 

Acids/Dextrose/ Fat Emulsion Intravenous 1,920 ml @  80 mls/hr TPN  CONT IV  

Last administered on 7/2/20at 21:52;  Start 7/2/20 at 22:00;  Stop 7/3/20 at 

21:59;  Status DC


Sodium Chloride 100 meq/Potassium Chloride 30 meq/ Potassium Acetate 30 

meq/Magnesium Sulfate 12 meq/ Multivitamins 10 ml/Chromium/ Copper/Manganese/ Se

arma/Zn 1 ml/ Insulin Human Regular 15 unit/ Total Parenteral Nutrition/Amino 

Acids/Dextrose/ Fat Emulsion Intravenous 1,920 ml @  80 mls/hr TPN  CONT IV  

Last administered on 7/3/20at 21:46;  Start 7/3/20 at 22:00;  Stop 7/4/20 at 

21:59;  Status DC


Sodium Chloride 100 meq/Potassium Chloride 30 meq/ Potassium Acetate 30 

meq/Magnesium Sulfate 12 meq/ Multivitamins 10 ml/Chromium/ Copper/Manganese/ 

Seleni/Zn 1 ml/ Insulin Human Regular 15 unit/ Total Parenteral Nutrition/Amino 

Acids/Dextrose/ Fat Emulsion Intravenous 1,800 ml @  75 mls/hr TPN  CONT IV  

Last administered on 7/4/20at 22:04;  Start 7/4/20 at 22:00;  Stop 7/5/20 at 

21:59;  Status DC


Fentanyl Citrate 55 ml @ 0 mls/hr CONT  PRN IV SEE COMMENTS Last administered on

7/6/20at 23:55;  Start 7/4/20 at 13:00;  Stop 7/9/20 at 17:28;  Status DC


Sodium Chloride 100 meq/Potassium Chloride 30 meq/ Potassium Acetate 30 

meq/Magnesium Sulfate 12 meq/ Multivitamins 10 ml/Chromium/ Copper/Manganese/ 

Seleni/Zn 1 ml/ Insulin Human Regular 15 unit/ Total Parenteral Nutrition/Amino 

Acids/Dextrose/ Fat Emulsion Intravenous 1,680 ml @  70 mls/hr TPN  CONT IV  

Last administered on 7/5/20at 21:23;  Start 7/5/20 at 22:00;  Stop 7/6/20 at 

21:59;  Status DC


Sodium Chloride 110 meq/Potassium Chloride 30 meq/ Potassium Acetate 30 

meq/Magnesium Sulfate 15 meq/ Multivitamins 10 ml/Chromium/ Copper/Manganese/ 

Seleni/Zn 1 ml/ Insulin Human Regular 15 unit/ Total Parenteral Nutrition/Amino 

Acids/Dextrose/ Fat Emulsion Intravenous 1,680 ml @  70 mls/hr TPN  CONT IV  

Last administered on 7/6/20at 21:48;  Start 7/6/20 at 22:00;  Stop 7/7/20 at 

21:59;  Status DC


Sodium Chloride 110 meq/Potassium Chloride 30 meq/ Potassium Acetate 30 

meq/Magnesium Sulfate 15 meq/ Multivitamins 10 ml/Chromium/ Copper/Manganese/ 

Seleni/Zn 1 ml/ Insulin Human Regular 15 unit/ Total Parenteral Nutrition/Amino 

Acids/Dextrose/ Fat Emulsion Intravenous 1,680 ml @  70 mls/hr TPN  CONT IV  La

st administered on 7/7/20at 21:33;  Start 7/7/20 at 22:00;  Stop 7/8/20 at 

21:59;  Status DC


Sodium Chloride 110 meq/Potassium Chloride 30 meq/ Potassium Acetate 30 

meq/Magnesium Sulfate 15 meq/ Multivitamins 10 ml/Chromium/ Copper/Manganese/ 

Seleni/Zn 1 ml/ Insulin Human Regular 15 unit/ Total Parenteral Nutrition/Amino 

Acids/Dextrose/ Fat Emulsion Intravenous 1,680 ml @  70 mls/hr TPN  CONT IV  

Last administered on 7/8/20at 21:51;  Start 7/8/20 at 22:00;  Stop 7/9/20 at 

21:59;  Status DC


Sodium Chloride 90 meq/Potassium Chloride 30 meq/ Potassium Acetate 30 

meq/Magnesium Sulfate 15 meq/ Multivitamins 10 ml/Chromium/ Copper/Manganese/ 

Seleni/Zn 1 ml/ Insulin Human Regular 15 unit/ Total Parenteral Nutrition/Amino 

Acids/Dextrose/ Fat Emulsion Intravenous 1,680 ml @  70 mls/hr TPN  CONT IV  

Last administered on 7/9/20at 22:38;  Start 7/9/20 at 22:00;  Stop 7/10/20 at 

21:59;  Status DC


Fentanyl Citrate 30 ml @ 0 mls/hr CONT  PRN IV SEE I/O RECORD;  Start 7/9/20 at 

17:30;  Stop 8/3/20 at 09:45;  Status DC


Fentanyl (Duragesic 12mcg/ Hr Patch) 1 patch Q3DAYS TD  Last administered on 

8/12/20at 08:28;  Start 7/10/20 at 09:00


Sodium Chloride 90 meq/Potassium Chloride 30 meq/ Potassium Acetate 30 

meq/Magnesium Sulfate 15 meq/ Multivitamins 10 ml/Chromium/ Copper/Manganese/ 

Seleni/Zn 1 ml/ Insulin Human Regular 15 unit/ Total Parenteral Nutrition/Amino 

Acids/Dextrose/ Fat Emulsion Intravenous 1,680 ml @  70 mls/hr TPN  CONT IV  

Last administered on 7/10/20at 21:59;  Start 7/10/20 at 22:00;  Stop 7/11/20 at 

21:59;  Status DC


Sodium Chloride 90 meq/Potassium Chloride 30 meq/ Potassium Acetate 30 

meq/Magnesium Sulfate 15 meq/ Multivitamins 10 ml/Chromium/ Copper/Manganese/ 

Seleni/Zn 1 ml/ Insulin Human Regular 15 unit/ Total Parenteral Nutrition/Amino 

Acids/Dextrose/ Fat Emulsion Intravenous 1,680 ml @  70 mls/hr TPN  CONT IV  

Last administered on 7/11/20at 21:35;  Start 7/11/20 at 22:00;  Stop 7/12/20 at 

21:59;  Status DC


Vancomycin HCl (Vanco Per Pharmacy) 1 each PRN DAILY  PRN MC SEE COMMENTS Last 

administered on 7/14/20at 02:46;  Start 7/12/20 at 09:15;  Stop 7/15/20 at 

07:41;  Status DC


Ciprofloxacin/ Dextrose 200 ml @  200 mls/hr Q12HR IV  Last administered on 

7/20/20at 21:02;  Start 7/12/20 at 10:00;  Stop 7/21/20 at 08:20;  Status DC


Vancomycin HCl 2 gm/Sodium Chloride 500 ml @  250 mls/hr 1X  ONCE IV  Last 

administered on 7/12/20at 10:34;  Start 7/12/20 at 10:00;  Stop 7/12/20 at 

11:59;  Status DC


Sodium Chloride 90 meq/Potassium Chloride 30 meq/ Potassium Acetate 30 

meq/Magnesium Sulfate 15 meq/ Multivitamins 10 ml/Chromium/ Copper/Manganese/ 

Seleni/Zn 1 ml/ Insulin Human Regular 15 unit/ Total Parenteral Nutrition/Amino 

Acids/Dextrose/ Fat Emulsion Intravenous 1,680 ml @  70 mls/hr TPN  CONT IV  

Last administered on 7/12/20at 22:02;  Start 7/12/20 at 22:00;  Stop 7/13/20 at 

21:59;  Status DC


Diphenhydramine HCl (Benadryl) 25 mg 1X  ONCE IVP  Last administered on 

7/12/20at 14:26;  Start 7/12/20 at 14:30;  Stop 7/12/20 at 14:31;  Status DC


Vancomycin HCl 1.5 gm/Sodium Chloride 500 ml @  250 mls/hr Q8H IV  Last 

administered on 7/13/20at 03:08;  Start 7/12/20 at 18:30;  Stop 7/13/20 at 

12:24;  Status DC


Vancomycin HCl (Vancomycin Trough Level) 1 each 1X  ONCE MC  Last administered 

on 7/13/20at 10:00;  Start 7/13/20 at 10:00;  Stop 7/13/20 at 10:01;  Status DC


Sodium Chloride 90 meq/Potassium Chloride 30 meq/ Potassium Acetate 30 

meq/Magnesium Sulfate 15 meq/ Multivitamins 10 ml/Chromium/ Copper/Manganese/ 

Seleni/Zn 1 ml/ Insulin Human Regular 15 unit/ Total Parenteral Nutrition/Amino 

Acids/Dextrose/ Fat Emulsion Intravenous 1,680 ml @  70 mls/hr TPN  CONT IV  

Last administered on 7/13/20at 22:13;  Start 7/13/20 at 22:00;  Stop 7/14/20 at 

21:59;  Status DC


Vancomycin HCl (Vancomycin Random Level) 1 each 1X  ONCE MC  Last administered 

on 7/14/20at 01:00;  Start 7/14/20 at 01:00;  Stop 7/14/20 at 01:01;  Status DC


Vancomycin HCl 1.5 gm/Sodium Chloride 500 ml @  250 mls/hr Q12H IV  Last 

administered on 7/14/20at 22:07;  Start 7/14/20 at 10:00;  Stop 7/15/20 at 

07:41;  Status DC


Vancomycin HCl (Vancomycin Trough Level) 1 each 1X  ONCE MC ;  Start 7/15/20 at 

09:30;  Stop 7/15/20 at 09:31;  Status Cancel


Sodium Chloride 90 meq/Potassium Chloride 30 meq/ Potassium Acetate 30 

meq/Magnesium Sulfate 15 meq/ Multivitamins 10 ml/Chromium/ Copper/Manganese/ 

Seleni/Zn 1 ml/ Insulin Human Regular 15 unit/ Total Parenteral Nutrition/Amino 

Acids/Dextrose/ Fat Emulsion Intravenous 1,680 ml @  70 mls/hr TPN  CONT IV  

Last administered on 7/14/20at 22:08;  Start 7/14/20 at 22:00;  Stop 7/15/20 at 

21:59;  Status DC


Alteplase, Recombinant (Cathflo For Central Catheter Clearance) 1 mg 1X  ONCE 

INT CAT  Last administered on 7/14/20at 11:49;  Start 7/14/20 at 11:00;  Stop 

7/14/20 at 11:01;  Status DC


Daptomycin 500 mg/ Sodium Chloride 50 ml @  100 mls/hr Q24H IV  Last 

administered on 7/24/20at 08:25;  Start 7/15/20 at 09:00;  Stop 7/24/20 at 

08:38;  Status DC


Sodium Chloride 90 meq/Potassium Chloride 30 meq/ Potassium Acetate 30 m

eq/Magnesium Sulfate 15 meq/ Multivitamins 10 ml/Chromium/ Copper/Manganese/ 

Seleni/Zn 1 ml/ Insulin Human Regular 15 unit/ Total Parenteral Nutrition/Amino 

Acids/Dextrose/ Fat Emulsion Intravenous 1,680 ml @  70 mls/hr TPN  CONT IV  

Last administered on 7/15/20at 22:55;  Start 7/15/20 at 22:00;  Stop 7/16/20 at 

21:59;  Status DC


Sodium Chloride 90 meq/Potassium Chloride 30 meq/ Potassium Acetate 30 

meq/Magnesium Sulfate 15 meq/ Multivitamins 10 ml/Chromium/ Copper/Manganese/ 

Seleni/Zn 1 ml/ Insulin Human Regular 15 unit/ Total Parenteral Nutrition/Amino 

Acids/Dextrose/ Fat Emulsion Intravenous 1,680 ml @  70 mls/hr TPN  CONT IV  La

st administered on 7/16/20at 22:06;  Start 7/16/20 at 22:00;  Stop 7/17/20 at 

21:59;  Status DC


Diphenhydramine HCl (Benadryl) 50 mg STK-MED ONCE .ROUTE ;  Start 7/16/20 at 

18:34;  Stop 7/16/20 at 18:35;  Status DC


Diphenhydramine HCl (Benadryl) 25 mg 1X  ONCE IM ;  Start 7/16/20 at 18:45;  

Stop 7/16/20 at 18:46;  Status DC


Diphenhydramine HCl (Benadryl) 25 mg 1X  ONCE IVP  Last administered on 

7/16/20at 18:56;  Start 7/16/20 at 19:00;  Stop 7/16/20 at 19:01;  Status DC


Alprazolam (Xanax) 0.5 mg PRN TID  PRN PO ANXIETY / AGITATION Last administered 

on 7/23/20at 11:49;  Start 7/17/20 at 08:00;  Stop 7/23/20 at 15:54;  Status DC


Sodium Chloride 110 meq/Potassium Chloride 30 meq/ Potassium Acetate 30 

meq/Magnesium Sulfate 15 meq/ Multivitamins 10 ml/Chromium/ Copper/Manganese/ 

Seleni/Zn 1 ml/ Insulin Human Regular 15 unit/ Total Parenteral Nutrition/Amino 

Acids/Dextrose/ Fat Emulsion Intravenous 1,680 ml @  70 mls/hr TPN  CONT IV  

Last administered on 7/17/20at 21:21;  Start 7/17/20 at 22:00;  Stop 7/18/20 at 

21:59;  Status DC


Sodium Chloride 110 meq/Potassium Chloride 30 meq/ Potassium Acetate 30 

meq/Magnesium Sulfate 15 meq/ Multivitamins 10 ml/Chromium/ Copper/Manganese/ 

Seleni/Zn 1 ml/ Insulin Human Regular 15 unit/ Total Parenteral Nutrition/Amino 

Acids/Dextrose/ Fat Emulsion Intravenous 1,680 ml @  70 mls/hr TPN  CONT IV  

Last administered on 7/18/20at 22:01;  Start 7/18/20 at 22:00;  Stop 7/19/20 at 

21:59;  Status DC


Alteplase, Recombinant (Cathflo For Central Catheter Clearance) 1 mg 1X  ONCE 

INT CAT  Last administered on 7/19/20at 08:23;  Start 7/19/20 at 08:15;  Stop 

7/19/20 at 08:16;  Status DC


Sodium Chloride 110 meq/Sodium Phosphate 10 mmol/ Potassium Chloride 30 meq/ 

Potassium Acetate 30 meq/Magnesium Sulfate 15 meq/ Multivitamins 10 ml/Chromium/

Copper/Manganese/ Seleni/Zn 1 ml/ Insulin Human Regular 15 unit/ Total 

Parenteral Nutrition/Amino Acids/Dextrose/ Fat Emulsion Intravenous 1,680 ml @  

70 mls/hr TPN  CONT IV  Last administered on 7/19/20at 22:03;  Start 7/19/20 at 

22:00;  Stop 7/20/20 at 21:59;  Status DC


Sodium Chloride 120 meq/Sodium Phosphate 10 mmol/ Potassium Chloride 30 meq/ 

Potassium Acetate 30 meq/Magnesium Sulfate 15 meq/ Multivitamins 10 ml/Chromium/

Copper/Manganese/ Seleni/Zn 1 ml/ Insulin Human Regular 15 unit/ Total 

Parenteral Nutrition/Amino Acids/Dextrose/ Fat Emulsion Intravenous 1,680 ml @  

70 mls/hr TPN  CONT IV  Last administered on 7/20/20at 22:08;  Start 7/20/20 at 

22:00;  Stop 7/21/20 at 21:59;  Status DC


Ceftazidime/ Avibactam 2.5 gm/ Sodium Chloride 100 ml @  50 mls/hr Q8HRS IV  

Last administered on 7/22/20at 05:32;  Start 7/21/20 at 14:00;  Stop 7/22/20 at 

07:48;  Status DC


Alteplase, Recombinant (Cathflo For Central Catheter Clearance) 1 mg 1X  ONCE 

INT CAT  Last administered on 7/21/20at 09:30;  Start 7/21/20 at 09:30;  Stop 

7/21/20 at 09:31;  Status DC


Sodium Chloride 120 meq/Sodium Phosphate 10 mmol/ Potassium Chloride 30 meq/ 

Potassium Acetate 30 meq/Magnesium Sulfate 15 meq/ Multivitamins 10 ml/Chromium/

Copper/Manganese/ Seleni/Zn 1 ml/ Insulin Human Regular 15 unit/ Total 

Parenteral Nutrition/Amino Acids/Dextrose/ Fat Emulsion Intravenous 1,680 ml @  

70 mls/hr TPN  CONT IV  Last administered on 7/21/20at 22:11;  Start 7/21/20 at 

22:00;  Stop 7/22/20 at 21:59;  Status DC


Iohexol (Omnipaque 300 Mg/ml) 75 ml 1X  ONCE IV  Last administered on 7/21/20at 

11:30;  Start 7/21/20 at 11:30;  Stop 7/21/20 at 11:31;  Status DC


Info (CONTRAST GIVEN -- Rx MONITORING) 1 each PRN DAILY  PRN MC SEE COMMENTS;  

Start 7/21/20 at 11:45;  Stop 7/23/20 at 11:44;  Status DC


Alteplase, Recombinant (Cathflo For Central Catheter Clearance) 1 mg 1X  ONCE 

INT CAT  Last administered on 7/21/20at 12:17;  Start 7/21/20 at 12:00;  Stop 

7/21/20 at 12:01;  Status DC


Cefepime HCl (Maxipime) 2 gm Q12HR IVP  Last administered on 7/22/20at 20:53;  

Start 7/22/20 at 09:00;  Stop 7/23/20 at 07:30;  Status DC


Sodium Chloride 120 meq/Sodium Phosphate 10 mmol/ Potassium Chloride 30 meq/ 

Potassium Acetate 30 meq/Magnesium Sulfate 15 meq/ Multivitamins 10 ml/Chromium/

Copper/Manganese/ Seleni/Zn 1 ml/ Insulin Human Regular 15 unit/ Total 

Parenteral Nutrition/Amino Acids/Dextrose/ Fat Emulsion Intravenous 1,680 ml @  

70 mls/hr TPN  CONT IV  Last administered on 7/22/20at 21:25;  Start 7/22/20 at 

22:00;  Stop 7/23/20 at 21:59;  Status DC


Ceftazidime/ Avibactam 2.5 gm/ Sodium Chloride 250 ml @  125 mls/hr Q8HRS IV  

Last administered on 8/5/20at 05:38;  Start 7/23/20 at 08:00;  Stop 8/5/20 at 08

:20;  Status DC


Sodium Chloride 120 meq/Sodium Phosphate 10 mmol/ Potassium Chloride 30 meq/ 

Potassium Acetate 30 meq/Magnesium Sulfate 15 meq/ Multivitamins 10 ml/Chromium/

Copper/Manganese/ Seleni/Zn 1 ml/ Insulin Human Regular 15 unit/ Total 

Parenteral Nutrition/Amino Acids/Dextrose/ Fat Emulsion Intravenous 1,680 ml @  

70 mls/hr TPN  CONT IV  Last administered on 7/23/20at 22:35;  Start 7/23/20 at 

22:00;  Stop 7/24/20 at 21:59;  Status DC


Alprazolam (Xanax) 0.5 mg PRN QID  PRN PO ANXIETY / AGITATION Last administered 

on 8/14/20at 08:41;  Start 7/23/20 at 16:00


Acetaminophen/ Hydrocodone Bitart (Lortab 5/325) 1 tab PRN Q4HRS  PRN PO PAIN 

Last administered on 8/13/20at 12:25;  Start 7/23/20 at 16:00


Sodium Chloride 120 meq/Sodium Phosphate 10 mmol/ Potassium Chloride 30 meq/ 

Potassium Acetate 30 meq/Magnesium Sulfate 15 meq/ Multivitamins 10 ml/Chromium/

Copper/Manganese/ Seleni/Zn 1 ml/ Insulin Human Regular 15 unit/ Total 

Parenteral Nutrition/Amino Acids/Dextrose/ Fat Emulsion Intravenous 1,680 ml @  

70 mls/hr TPN  CONT IV  Last administered on 7/24/20at 21:50;  Start 7/24/20 at 

22:00;  Stop 7/25/20 at 21:59;  Status DC


Sodium Chloride 120 meq/Sodium Phosphate 10 mmol/ Potassium Chloride 30 meq/ 

Potassium Acetate 30 meq/Magnesium Sulfate 15 meq/ Multivitamins 10 ml/Chromium/

Copper/Manganese/ Seleni/Zn 1 ml/ Insulin Human Regular 15 unit/ Total 

Parenteral Nutrition/Amino Acids/Dextrose/ Fat Emulsion Intravenous 1,680 ml @  

70 mls/hr TPN  CONT IV  Last administered on 7/25/20at 22:14;  Start 7/25/20 at 

22:00;  Stop 7/26/20 at 21:59;  Status DC


Daptomycin 500 mg/ Sodium Chloride 50 ml @  100 mls/hr Q24H IV  Last 

administered on 8/4/20at 09:20;  Start 7/26/20 at 09:00;  Stop 8/5/20 at 08:20; 

Status DC


Sodium Chloride 120 meq/Sodium Phosphate 10 mmol/ Potassium Chloride 30 meq/ 

Potassium Acetate 30 meq/Magnesium Sulfate 15 meq/ Multivitamins 10 ml/Chromium/

Copper/Manganese/ Seleni/Zn 1 ml/ Insulin Human Regular 15 unit/ Total 

Parenteral Nutrition/Amino Acids/Dextrose/ Fat Emulsion Intravenous 1,680 ml @  

70 mls/hr TPN  CONT IV ;  Start 7/26/20 at 22:00;  Stop 7/27/20 at 21:59;  

Status Cancel


Amino Acids/ Glycerin/ Electrolytes 1,000 ml @  80 mls/hr [id number] IV  Last 

administered on 8/1/20at 18:10;  Start 7/26/20 at 10:15;  Stop 8/2/20 at 07:07; 

Status DC


Iohexol (Omnipaque 300 Mg/ml) 50 ml 1X  ONCE IJ ;  Start 7/28/20 at 11:00;  Stop

7/28/20 at 11:01;  Status DC


Sodium Chloride 500 ml @  500 mls/hr 1X  ONCE IV  Last administered on 7/28/20at

18:30;  Start 7/28/20 at 18:30;  Stop 7/28/20 at 19:29;  Status DC


Lidocaine HCl (Buffered Lidocaine 1%) 3 ml 1X  ONCE INJ ;  Start 7/30/20 at 

12:15;  Stop 7/30/20 at 12:17;  Status DC


Fentanyl Citrate (Fentanyl 2ml Vial) 25 mcg PRN Q6HRS  PRN IVP SEE INSTUCTIONS 

Last administered on 8/13/20at 15:54;  Start 8/3/20 at 10:00


Sodium Chloride 1,000 ml @  125 mls/hr Q8H IV  Last administered on 8/3/20at 

23:16;  Start 8/3/20 at 23:21;  Stop 8/4/20 at 09:23;  Status DC


Sodium Chloride 1,000 ml @  350 mls/hr 1X  ONCE IV  Last administered on 

8/3/20at 20:29;  Start 8/3/20 at 20:30;  Stop 8/3/20 at 23:21;  Status DC


Vitamin A/Vitamin D (Vitamin A & D Ointment) 1 thomas PRN Q1HR  PRN TP SKIN 

PROTECTION Last administered on 8/13/20at 08:36;  Start 8/4/20 at 09:15


Iohexol (Omnipaque 240 Mg/ml) 50 ml STK-MED ONCE .ROUTE ;  Start 8/4/20 at 

14:18;  Stop 8/4/20 at 14:19;  Status DC


Lidocaine HCl (Buffered Lidocaine 1%) 3 ml STK-MED ONCE .ROUTE ;  Start 8/4/20 

at 14:19;  Stop 8/4/20 at 14:19;  Status DC


Fentanyl Citrate (Fentanyl 2ml Vial) 100 mcg STK-MED ONCE .ROUTE ;  Start 8/4/20

at 15:03;  Stop 8/4/20 at 15:03;  Status DC


Lidocaine HCl (Buffered Lidocaine 1%) 5 ml 1X  ONCE INJ  Last administered on 

8/4/20at 15:00;  Start 8/4/20 at 15:45;  Stop 8/4/20 at 15:46;  Status DC


Iohexol (Omnipaque 240 Mg/ml) 10 ml 1X  ONCE IJ  Last administered on 8/4/20at 

15:00;  Start 8/4/20 at 15:45;  Stop 8/4/20 at 15:46;  Status DC


Multivitamins/ Minerals Therapeutic (Centrum Multivit-Mineral Liq) 5 ml DAILY 

PEG  Last administered on 8/14/20at 08:40;  Start 8/5/20 at 09:00


Alteplase, Recombinant 5 mg/ Sodium Chloride 30 ml @ 30 mls/hr 1X  PRN IV SEE 

COMMENTS;  Start 8/5/20 at 06:45;  Status UNV


Alteplase, Recombinant (Cathflo For Central Catheter Clearance) 1 mg 1X  ONCE 

INT CAT  Last administered on 8/5/20at 09:30;  Start 8/5/20 at 07:00;  Stop 

8/5/20 at 07:01;  Status DC


Sodium Chloride 1,000 ml @  125 mls/hr 1X  ONCE IV  Last administered on 

8/5/20at 16:12;  Start 8/5/20 at 14:30;  Stop 8/5/20 at 22:29;  Status DC


Furosemide (Lasix) 40 mg 1X  ONCE IVP  Last administered on 8/5/20at 16:17;  

Start 8/5/20 at 14:30;  Stop 8/5/20 at 14:33;  Status DC


Furosemide (Lasix) 40 mg [id number] IVP  Last administered on 8/6/20at 13:51;  Start 

8/6/20 at 09:00;  Stop 8/6/20 at 14:01;  Status DC


Sodium Chloride 1,000 ml @  125 mls/hr 1X  ONCE IV  Last administered on 

8/6/20at 08:20;  Start 8/6/20 at 07:45;  Stop 8/6/20 at 15:44;  Status DC


Calcitonin [place] (Miacalcin) 400 unit BID SQ  Last administered on 8/6/20at 

18:18;  Start 8/6/20 at 18:00;  Stop 8/7/20 at 15:56;  Status DC


Zoledronic Acid 100 ml @  400 mls/hr 1X  ONCE IV  Last administered on 8/7/20at 

13:04;  Start 8/7/20 at 12:00;  Stop 8/7/20 at 12:14;  Status DC


Metoprolol Tartrate (Lopressor Vial) 5 mg 1X  ONCE IVP  Last administered on 

8/9/20at 10:54;  Start 8/9/20 at 10:45;  Stop 8/9/20 at 10:46;  Status DC


Cefepime HCl (Maxipime) 2 gm Q12HR IVP  Last administered on 8/14/20at 08:40;  

Start 8/10/20 at 10:00


Metronidazole 100 ml @  100 mls/hr Q12HR IV  Last administered on 8/14/20at 

08:40;  Start 8/10/20 at 10:00


Sodium Chloride 1,000 ml @  75 mls/hr [id number] IV  Last administered on 8/11/20at

09:55;  Start 8/10/20 at 18:45;  Stop 8/12/20 at 11:12;  Status DC


Sodium Chloride 1,000 ml @  1,000 mls/hr 1X  ONCE IV  Last administered on 

8/11/20at 14:00;  Start 8/11/20 at 14:00;  Stop 8/11/20 at 14:59;  Status DC


Ringer's Solution 1,000 ml @  175 mls/hr [id number] IV  Last administered on 

8/14/20at 05:58;  Start 8/11/20 at 14:30


Sodium Bicarbonate (Sodium Bicarb Adult 8.4% Syr) 100 meq 1X  ONCE IV  Last 

administered on 8/11/20at 14:32;  Start 8/11/20 at 14:30;  Stop 8/11/20 at 

14:35;  Status DC


Sodium Bicarbonate (Sodium Bicarb Adult 8.4% Syr) 50 meq STK-MED ONCE .ROUTE ;  

Start 8/11/20 at 14:30;  Stop 8/11/20 at 14:30;  Status DC


Sodium Chloride 1,000 ml @  1,000 mls/hr 1X  ONCE IV  Last administered on 

8/12/20at 09:18;  Start 8/12/20 at 08:45;  Stop 8/12/20 at 09:44;  Status DC


Sodium Chloride 1,000 ml @  1,000 mls/hr 1X  ONCE IV  Last administered on 

8/12/20at 17:17;  Start 8/12/20 at 17:15;  Stop 8/12/20 at 18:14;  Status DC


Sodium Chloride 1,000 ml @  1,000 mls/hr 1X  ONCE IV  Last administered on 

8/13/20at 09:39;  Start 8/13/20 at 09:45;  Stop 8/13/20 at 10:44;  Status DC


Sodium Chloride 1,000 ml @  1,000 mls/hr 1X  ONCE IV  Last administered on 

8/13/20at 17:36;  Start 8/13/20 at 18:00;  Stop 8/13/20 at 18:59;  Status DC





Active Scripts


Active


Reported


Bisoprolol Fumarate 5 Mg Tablet 10 Mg PO DAILY


Vitals/I & O





Vital Sign - Last 24 Hours








 8/13/20 8/13/20 8/13/20 8/13/20





 13:25 14:34 15:12 15:40


 


Temp  97.4 97.4 





  97.4 97.4 


 


Pulse  128 128 


 


Resp  24 22 


 


B/P (MAP)  105/48 (67) 105/48 


 


Pulse Ox 98 96  98


 


O2 Delivery Nasal Cannula Nasal Cannula  Nasal Cannula


 


O2 Flow Rate 1.5 1.5  1.5


 


    





    





 8/13/20 8/13/20 8/13/20 8/13/20





 15:54 16:15 16:24 17:15


 


Temp  97.5  97.5





  97.5  97.5


 


Pulse  120  120


 


Resp  26  24


 


B/P (MAP)  102/48  107/56


 


Pulse Ox 98  98 


 


O2 Delivery Nasal Cannula  Nasal Cannula 


 


O2 Flow Rate 1.5  1.5 


 


    





    





 8/13/20 8/13/20 8/13/20 8/13/20





 18:27 19:59 20:00 20:04


 


Temp 97.5 98.1  





 97.5 98.1  


 


Pulse 117 121  


 


Resp 24 24  


 


B/P (MAP) 108/50 114/73 (87)  


 


Pulse Ox  96  98


 


O2 Delivery  Nasal Cannula Nasal Cannula Nasal Cannula


 


O2 Flow Rate  2.0 2.0 1.5


 


    





    





 8/13/20 8/13/20 8/14/20 8/14/20





 20:05 22:43 00:21 03:00


 


Temp  98.0  





  98.0  


 


Pulse  124  


 


Resp  22  


 


B/P (MAP)  135/59 (84)  


 


Pulse Ox 98 100  


 


O2 Delivery Nasal Cannula Nasal Cannula Nasal Cannula Nasal Cannula


 


O2 Flow Rate 1.5 2.0 2.0 


 


    





    





 8/14/20 8/14/20 8/14/20 8/14/20





 03:40 04:51 07:00 08:00


 


Temp  97.8 97.5 





  97.8 97.5 


 


Pulse  121 117 


 


Resp  24 28 


 


B/P (MAP)  110/66 (81) 102/55 (71) 


 


Pulse Ox  96 99 


 


O2 Delivery Nasal Cannula Nasal Cannula Nasal Cannula Nasal Cannula


 


O2 Flow Rate 2.0 2.0 2.0 2.0


 


    





    





 8/14/20 8/14/20 8/14/20 8/14/20





 08:09 08:10 11:00 12:11


 


Temp   97.0 





   97.0 


 


Pulse   113 


 


Resp   26 


 


B/P (MAP)   96/56 (69) 


 


Pulse Ox 98 98 98 98


 


O2 Delivery Nasal Cannula Nasal Cannula Nasal Cannula Nasal Cannula


 


O2 Flow Rate 2.0 2.0 2.0 2.0














Intake and Output   


 


 8/13/20 8/13/20 8/14/20





 15:00 23:00 07:00


 


Intake Total 0 ml 1032 ml 0 ml


 


Output Total  825 ml 1050 ml


 


Balance 0 ml 207 ml -1050 ml











Justicifation of Admission Dx:


Justifications for Admission:


Justification of Admission Dx:  Yes











MG ARGUETA MD                 Aug 14, 2020 12:55

## 2020-08-14 NOTE — PDOC
Date of Service:


DATE: 8/14/20 


TIME: 12:37





Objective:


Objective:


D/w nurse - lots of liquid stools, runs off bed.  ?new sites of drainage  Leaned

back in bed - gagging, maybe some green emesis.


Vital Signs:





                                   Vital Signs








  Date Time  Temp Pulse Resp B/P (MAP) Pulse Ox O2 Delivery O2 Flow Rate FiO2


 


8/14/20 12:11     98 Nasal Cannula 2.0 


 


8/14/20 11:00 97.0 113 26 96/56 (69)    





 97.0       








Labs:





Laboratory Tests








Test


 8/13/20


17:51 8/13/20


22:54 8/14/20


07:22 8/14/20


12:11


 


Glucose (Fingerstick) 167 mg/dL  150 mg/dL  205 mg/dL  208 mg/dL 





  BLOOD CULTURE  Preliminary  


        NO GROWTH AFTER 4 DAYS                 





C Diff negative





PE:





GEN: chronically ill


LUNGS: RT present





A/P:


S/p pancreatic necrosectomy


Loose stools





--


Will review w/ Dr. Bhatia.





Justicifation of Admission Dx:


Justifications for Admission:


Justification of Admission Dx:  Yes











RAGHAVENDRA MURCIA         Aug 14, 2020 12:40

## 2020-08-14 NOTE — PDOC
PULMONARY PROGRESS NOTES


DATE: 8/14/20 


TIME: 10:49


Subjective


Pt. remains weak, now on 2 liters N/C 


Trach in place 


Low HGB recently 7.0 on 8/13/2020


Vitals





Vital Signs








  Date Time  Temp Pulse Resp B/P (MAP) Pulse Ox O2 Delivery O2 Flow Rate FiO2


 


8/14/20 08:10     98 Nasal Cannula 2.0 


 


8/14/20 07:00 97.5 117 28 102/55 (71)    





 97.5       








ROS:  No Nausea, No Chest Pain, No Abdominal Pain, No Increase Cough


General:  Alert, No acute distress


Lungs:  Clear


Cardiovascular:  S1, S2


Abdomen:  Soft, Non-tender, Other (multiple ROBERT drains )


Neuro Exam:  Alert


Extremities:  Other (+1 BLE edema)


Skin:  Warm


Labs





Laboratory Tests








Test


 8/12/20


11:59 8/12/20


17:40 8/13/20


00:26 8/13/20


04:30


 


Glucose (Fingerstick)


 126 mg/dL


(70-99) 156 mg/dL


(70-99) 157 mg/dL


(70-99) 





 


White Blood Count


 


 


 


 12.2 x10^3/uL


(4.0-11.0)


 


Red Blood Count


 


 


 


 2.58 x10^6/uL


(3.50-5.40)


 


Hemoglobin


 


 


 


 7.0 g/dL


(12.0-15.5)


 


Hematocrit


 


 


 


 23.4 %


(36.0-47.0)


 


Mean Corpuscular Volume    91 fL () 


 


Mean Corpuscular Hemoglobin    27 pg (25-35) 


 


Mean Corpuscular Hemoglobin


Concent 


 


 


 30 g/dL


(31-37)


 


Red Cell Distribution Width


 


 


 


 20.5 %


(11.5-14.5)


 


Platelet Count


 


 


 


 394 x10^3/uL


(140-400)


 


Sodium Level


 


 


 


 151 mmol/L


(136-145)


 


Potassium Level


 


 


 


 4.1 mmol/L


(3.5-5.1)


 


Chloride Level


 


 


 


 119 mmol/L


()


 


Carbon Dioxide Level


 


 


 


 21 mmol/L


(21-32)


 


Anion Gap    11 (6-14) 


 


Blood Urea Nitrogen


 


 


 


 75 mg/dL


(7-20)


 


Creatinine


 


 


 


 2.0 mg/dL


(0.6-1.0)


 


Estimated GFR


(Cockcroft-Gault) 


 


 


 26.5 





 


Glucose Level


 


 


 


 164 mg/dL


(70-99)


 


Calcium Level


 


 


 


 7.0 mg/dL


(8.5-10.1)


 


Test


 8/13/20


06:58 8/13/20


11:14 8/13/20


17:51 8/13/20


22:54


 


Glucose (Fingerstick)


 192 mg/dL


(70-99) 144 mg/dL


(70-99) 167 mg/dL


(70-99) 150 mg/dL


(70-99)


 


Test


 8/14/20


07:22 


 


 





 


Glucose (Fingerstick)


 205 mg/dL


(70-99) 


 


 











Laboratory Tests








Test


 8/13/20


11:14 8/13/20


17:51 8/13/20


22:54 8/14/20


07:22


 


Glucose (Fingerstick)


 144 mg/dL


(70-99) 167 mg/dL


(70-99) 150 mg/dL


(70-99) 205 mg/dL


(70-99)








Medications





Active Scripts








 Medications  Dose


 Route/Sig


 Max Daily Dose Days Date Category


 


 Bisoprolol


 Fumarate 5 Mg


 Tablet  10 Mg


 PO DAILY


   3/16/20 Reported








Comments








ct reviewed 7/12/20, Decreased left-sided effusion after catheter placement. The




right-sided effusion has increased as has atelectasis.


 





 


There has been exchange or placement of multiple drainage 


tubes and a gastrojejunostomy tube. Both collections are smaller. No 


significant new abdominal fluid collection is seen. The jejunal component 


of the gastrojejunostomy tube appears to be looped in the proximal small 


bowel. 











ct abdomen /pelvis 6/6


1. Removal of the percutaneous pigtail drainage catheters since the prior 


exam. Sequela of pancreatitis with extensive pseudocysts again 


demonstrated, the right-sided collections are slightly larger since the 


prior exam, the left-sided collections are stable. See above.


2. Moderate to large left pleural effusion with atelectasis and collapse 


of most of the left lower lobe, stable. Small right pleural effusion is 


stable.


3. Gallstone.





ct chest 6/15 reviewed








 GRAM NEG COCCOBACILLI:MANY


        SQUAMOUS EPI CELL:RARE


        PMN (WBCs):FEW


        Unless otherwise specified, Testing Performed by:


        [address] [address]


        For Inquires, the Physician may contact the Microbiology


        department at [phone]





  RESPIRATORY CULTURE  Final  


        Final





        MANY GRAM NEGATIVE RODS on 06/15/20 at 1100


        FINAL ID= [PSEUDOMONAS AERUGINOSA]


        MICRO CHARGES


        PSEUDOMONAS AERUGINOSA





  ANTIMICROBIAL SUSCEPTIBILITY  Final  


        Comment





        NEG ANSON 56


        PSEUDOMONAS AERUGINOSA


        ANTIBIOTIC                        RESULT          INTERPRETATION


        AMIKACIN                          <=16                  S


        AZTREONAM                         <=4                   S


        CEFTAZIDIME                       <=1                   S


        CIPROFLOXACIN                     <=0.25                S


        CEFEPIME                          <=2                   S


        CEFTAZIDIME/AVIBACTAM             <=4                   S


        GENTAMICIN                        <=2                   S


        LEVOFLOXACIN                      <=0.5                 S





Impression


.


IMPRESSION:


1.  Acute hypoxemic respiratory failure secondary to ARDS status post trach, 

developed anemia 6/7, blood drainage from RLQ abdomen drain site, and 

surrounding firmness  / developed septic shock 6/7 from abdomen source, required

levo 6/7-- now on 2 liters with trach 


s/p 3 new drains 6/7 with brown color drainage, --- off pressors


S/P Exp. 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 

parapsilosis & PSAE (I-merrem/ceftazidime/AZT/cefepime))


Increase R/R, hr 8/11. RESPONDED TO BICARB/ PRN SUCTION


Acute gallstone pancreatitis with persistent necrosis


  - 4/9.  CT A/P Increased ascites. Persistent evidence of necrotizing pancre

atitis with fluid and phlegmon at the pancreas


  - 4/27. status post ROBERT drain placement; C. parapsilosis. s/p drain 5/6 + yeast

& high amylase; s/p additional drain on 5/8. Drains removed. 


  -5/6. fluid  candida parapsilosis fluid, amylase high


  - 6/6 showed multiple pseudocysts, slight larger on the right. s/p drains x 3,

6/7.  + PSAE (MDRO-R Cefepime, Zosyn ANSON < 64) and yeast, 


  -6/7 s/p drain replacement x 3; fluid cult PSAE (MDRO), yeast; treated


  -7/12 CT A/P shows smaller fluid collections.  


  -722 CT abdomen and pelvis drains in place       


Ascites s/p paracentesis 4/15 & 5/6. C. parapsilosis 


Cholelithiasis with thickening of the gallbladder wall.


JED, Hyperkalemia, Metabolic acidosis off dialysis


Acute hypoxic resp failure. trach/vent. sputum 6/13  + PSAE (I merrem) ; sputum 

culture July 19+ for PSAE R Merrem, sensitive to cefepime


Pleural effusion status post CTS left side


Abdominal fluid culture 6 /7 MDRO Pseudomonas, yeast


Sputum culture positive 7/19 for MDRO Pseudomonas


Chest tube fluid positive for 7/21 Candida Parapsilosis


S/P Exploratory laparotomy, lysis of adhesions, subtotal cholecystectomy with 

cholangiogram, gastrojejunostomy tube placement, pancreatic necrosectomy














Plan


.


Remains weak, now on 2 liters N/C, required suctioning from nursing overnight 


prn suction


Transfuse as needed, monitor HGB was 7.0 as of 8/13/20


Antibiotics per ID,


Up to chair, PT/OT


Follow surgery input-- 


DVT GI prophylaxis








DVT/GI PPX








Will see PRN call with any concerns 














SHARYN SOLORZANO MD                 Aug 14, 2020 10:51

## 2020-08-15 VITALS — DIASTOLIC BLOOD PRESSURE: 63 MMHG | SYSTOLIC BLOOD PRESSURE: 107 MMHG

## 2020-08-15 VITALS — SYSTOLIC BLOOD PRESSURE: 124 MMHG | DIASTOLIC BLOOD PRESSURE: 69 MMHG

## 2020-08-15 VITALS — SYSTOLIC BLOOD PRESSURE: 130 MMHG | DIASTOLIC BLOOD PRESSURE: 73 MMHG

## 2020-08-15 VITALS — DIASTOLIC BLOOD PRESSURE: 68 MMHG | SYSTOLIC BLOOD PRESSURE: 117 MMHG

## 2020-08-15 VITALS — DIASTOLIC BLOOD PRESSURE: 80 MMHG | SYSTOLIC BLOOD PRESSURE: 148 MMHG

## 2020-08-15 VITALS — SYSTOLIC BLOOD PRESSURE: 98 MMHG | DIASTOLIC BLOOD PRESSURE: 48 MMHG

## 2020-08-15 LAB
ALBUMIN SERPL-MCNC: 1 G/DL (ref 3.4–5)
ALBUMIN/GLOB SERPL: 0.3 {RATIO} (ref 1–1.7)
ALP SERPL-CCNC: 83 U/L (ref 46–116)
ALT SERPL-CCNC: 8 U/L (ref 14–59)
ANION GAP SERPL CALC-SCNC: 14 MMOL/L (ref 6–14)
AST SERPL-CCNC: 16 U/L (ref 15–37)
BASOPHILS # BLD AUTO: 0 X10^3/UL (ref 0–0.2)
BASOPHILS NFR BLD: 0 % (ref 0–3)
BILIRUB SERPL-MCNC: 0.2 MG/DL (ref 0.2–1)
BUN SERPL-MCNC: 61 MG/DL (ref 7–20)
BUN/CREAT SERPL: 31 (ref 6–20)
CALCIUM SERPL-MCNC: 6.8 MG/DL (ref 8.5–10.1)
CHLORIDE SERPL-SCNC: 119 MMOL/L (ref 98–107)
CO2 SERPL-SCNC: 19 MMOL/L (ref 21–32)
CREAT SERPL-MCNC: 2 MG/DL (ref 0.6–1)
EOSINOPHIL NFR BLD: 0.1 X10^3/UL (ref 0–0.7)
EOSINOPHIL NFR BLD: 1 % (ref 0–3)
ERYTHROCYTE [DISTWIDTH] IN BLOOD BY AUTOMATED COUNT: 20.2 % (ref 11.5–14.5)
GFR SERPLBLD BASED ON 1.73 SQ M-ARVRAT: 26.5 ML/MIN
GLOBULIN SER-MCNC: 3.6 G/DL (ref 2.2–3.8)
GLUCOSE SERPL-MCNC: 180 MG/DL (ref 70–99)
HCT VFR BLD CALC: 27.6 % (ref 36–47)
HGB BLD-MCNC: 8.6 G/DL (ref 12–15.5)
LYMPHOCYTES # BLD: 1.6 X10^3/UL (ref 1–4.8)
LYMPHOCYTES NFR BLD AUTO: 12 % (ref 24–48)
MCH RBC QN AUTO: 28 PG (ref 25–35)
MCHC RBC AUTO-ENTMCNC: 31 G/DL (ref 31–37)
MCV RBC AUTO: 88 FL (ref 79–100)
MONO #: 0.9 X10^3/UL (ref 0–1.1)
MONOCYTES NFR BLD: 7 % (ref 0–9)
NEUT #: 10.9 X10^3/UL (ref 1.8–7.7)
NEUTROPHILS NFR BLD AUTO: 80 % (ref 31–73)
PLATELET # BLD AUTO: 366 X10^3/UL (ref 140–400)
POTASSIUM SERPL-SCNC: 3.8 MMOL/L (ref 3.5–5.1)
PROT SERPL-MCNC: 4.6 G/DL (ref 6.4–8.2)
RBC # BLD AUTO: 3.13 X10^6/UL (ref 3.5–5.4)
SODIUM SERPL-SCNC: 152 MMOL/L (ref 136–145)
WBC # BLD AUTO: 13.6 X10^3/UL (ref 4–11)

## 2020-08-15 RX ADMIN — IPRATROPIUM BROMIDE AND ALBUTEROL SULFATE SCH ML: .5; 3 SOLUTION RESPIRATORY (INHALATION) at 00:00

## 2020-08-15 RX ADMIN — HYDROCODONE BITARTRATE AND ACETAMINOPHEN PRN TAB: 5; 325 TABLET ORAL at 14:27

## 2020-08-15 RX ADMIN — PANTOPRAZOLE SODIUM SCH MG: 40 INJECTION, POWDER, FOR SOLUTION INTRAVENOUS at 08:40

## 2020-08-15 RX ADMIN — IPRATROPIUM BROMIDE AND ALBUTEROL SULFATE SCH ML: .5; 3 SOLUTION RESPIRATORY (INHALATION) at 07:54

## 2020-08-15 RX ADMIN — SODIUM CHLORIDE, SODIUM LACTATE, POTASSIUM CHLORIDE, AND CALCIUM CHLORIDE SCH MLS/HR: .6; .31; .03; .02 INJECTION, SOLUTION INTRAVENOUS at 02:20

## 2020-08-15 RX ADMIN — SODIUM CHLORIDE, SODIUM LACTATE, POTASSIUM CHLORIDE, AND CALCIUM CHLORIDE SCH MLS/HR: .6; .31; .03; .02 INJECTION, SOLUTION INTRAVENOUS at 11:35

## 2020-08-15 RX ADMIN — FENTANYL SCH PATCH: 12.5 PATCH TRANSDERMAL at 08:38

## 2020-08-15 RX ADMIN — INSULIN LISPRO SCH UNITS: 100 INJECTION, SOLUTION INTRAVENOUS; SUBCUTANEOUS at 18:00

## 2020-08-15 RX ADMIN — IPRATROPIUM BROMIDE AND ALBUTEROL SULFATE SCH ML: .5; 3 SOLUTION RESPIRATORY (INHALATION) at 20:14

## 2020-08-15 RX ADMIN — CEFEPIME SCH GM: 2 INJECTION, POWDER, FOR SOLUTION INTRAVENOUS at 08:55

## 2020-08-15 RX ADMIN — SODIUM CHLORIDE, SODIUM LACTATE, POTASSIUM CHLORIDE, AND CALCIUM CHLORIDE SCH MLS/HR: .6; .31; .03; .02 INJECTION, SOLUTION INTRAVENOUS at 22:30

## 2020-08-15 RX ADMIN — CYCLOBENZAPRINE HYDROCHLORIDE PRN MG: 10 TABLET, FILM COATED ORAL at 14:25

## 2020-08-15 RX ADMIN — ALBUMIN (HUMAN) SCH MLS/HR: 0.25 INJECTION, SOLUTION INTRAVENOUS at 18:40

## 2020-08-15 RX ADMIN — INSULIN LISPRO SCH UNITS: 100 INJECTION, SOLUTION INTRAVENOUS; SUBCUTANEOUS at 12:00

## 2020-08-15 RX ADMIN — ONDANSETRON PRN MG: 2 INJECTION INTRAMUSCULAR; INTRAVENOUS at 14:27

## 2020-08-15 RX ADMIN — ENOXAPARIN SODIUM SCH MG: 40 INJECTION SUBCUTANEOUS at 08:54

## 2020-08-15 RX ADMIN — INSULIN LISPRO SCH UNITS: 100 INJECTION, SOLUTION INTRAVENOUS; SUBCUTANEOUS at 06:00

## 2020-08-15 RX ADMIN — ACETYLCYSTEINE SCH MG: 200 INHALANT RESPIRATORY (INHALATION) at 20:14

## 2020-08-15 RX ADMIN — MULTIVITAMIN SCH ML: LIQUID ORAL at 08:37

## 2020-08-15 RX ADMIN — IPRATROPIUM BROMIDE AND ALBUTEROL SULFATE SCH ML: .5; 3 SOLUTION RESPIRATORY (INHALATION) at 16:20

## 2020-08-15 RX ADMIN — INSULIN LISPRO SCH UNITS: 100 INJECTION, SOLUTION INTRAVENOUS; SUBCUTANEOUS at 00:00

## 2020-08-15 RX ADMIN — CEFEPIME SCH GM: 2 INJECTION, POWDER, FOR SOLUTION INTRAVENOUS at 21:05

## 2020-08-15 RX ADMIN — IPRATROPIUM BROMIDE AND ALBUTEROL SULFATE SCH ML: .5; 3 SOLUTION RESPIRATORY (INHALATION) at 03:06

## 2020-08-15 RX ADMIN — IPRATROPIUM BROMIDE AND ALBUTEROL SULFATE SCH ML: .5; 3 SOLUTION RESPIRATORY (INHALATION) at 11:43

## 2020-08-15 RX ADMIN — ACETYLCYSTEINE SCH MG: 200 INHALANT RESPIRATORY (INHALATION) at 07:54

## 2020-08-15 NOTE — PDOC
Infectious Disease Note


Subjective


Subjective


Noncommunicative, lethargic


No fevers las 48 hrs





Vital Sign


Vital Signs





Vital Signs








  Date Time  Temp Pulse Resp B/P (MAP) Pulse Ox O2 Delivery O2 Flow Rate FiO2


 


8/15/20 08:38     97 Tracheal Collar  


 


8/15/20 07:55       2.0 


 


8/15/20 07:00 97.9 121 20 98/48 (65)    





 97.9       











Physical Exam


PHYSICAL EXAM


GENERAL: Resting in bed, NAD


HEENT: Pupils equal, oral cavity dry. OC/Op - Dry


NECK:  Tracheostomy capped


LUNGS: Diminished aeration bases,


HEART:  S1, S2, 


ABDOMEN: Less distended, mild- bowel sounds hypoactive, soft, GJ drain present, 

drainage bag in place with depedent drainage


: [place] in place 


EXTREMITIES: Trace generalized edema, no cyanosis. Yeast in groin area


SKIN: warm touch. No signs of rash.  


NEURO: - Alert and responsive


RUE  PICC site without signs of complications. PIV s clean


EC fistula





Labs


Lab





Laboratory Tests








Test


 8/14/20


12:11 8/14/20


14:00 8/14/20


17:31 8/15/20


00:23


 


Glucose (Fingerstick)


 208 mg/dL


(70-99) 


 125 mg/dL


(70-99) 161 mg/dL


(70-99)


 


White Blood Count


 


 10.0 x10^3/uL


(4.0-11.0) 


 





 


Red Blood Count


 


 2.83 x10^6/uL


(3.50-5.40) 


 





 


Hemoglobin


 


 7.9 g/dL


(12.0-15.5) 


 





 


Hematocrit


 


 25.7 %


(36.0-47.0) 


 





 


Mean Corpuscular Volume  91 fL ()   


 


Mean Corpuscular Hemoglobin  28 pg (25-35)   


 


Mean Corpuscular Hemoglobin


Concent 


 31 g/dL


(31-37) 


 





 


Red Cell Distribution Width


 


 20.7 %


(11.5-14.5) 


 





 


Platelet Count


 


 320 x10^3/uL


(140-400) 


 





 


Neutrophils (%) (Auto)  77 % (31-73)   


 


Lymphocytes (%) (Auto)  14 % (24-48)   


 


Monocytes (%) (Auto)  8 % (0-9)   


 


Eosinophils (%) (Auto)  1 % (0-3)   


 


Basophils (%) (Auto)  0 % (0-3)   


 


Neutrophils # (Auto)


 


 7.7 x10^3/uL


(1.8-7.7) 


 





 


Lymphocytes # (Auto)


 


 1.4 x10^3/uL


(1.0-4.8) 


 





 


Monocytes # (Auto)


 


 0.8 x10^3/uL


(0.0-1.1) 


 





 


Eosinophils # (Auto)


 


 0.1 x10^3/uL


(0.0-0.7) 


 





 


Basophils # (Auto)


 


 0.0 x10^3/uL


(0.0-0.2) 


 





 


Sodium Level


 


 153 mmol/L


(136-145) 


 





 


Potassium Level


 


 2.9 mmol/L


(3.5-5.1) 


 





 


Chloride Level


 


 124 mmol/L


() 


 





 


Carbon Dioxide Level


 


 17 mmol/L


(21-32) 


 





 


Anion Gap  12 (6-14)   


 


Blood Urea Nitrogen


 


 58 mg/dL


(7-20) 


 





 


Creatinine


 


 1.7 mg/dL


(0.6-1.0) 


 





 


Estimated GFR


(Cockcroft-Gault) 


 31.9 


 


 





 


BUN/Creatinine Ratio  34 (6-20)   


 


Glucose Level


 


 171 mg/dL


(70-99) 


 





 


Calcium Level


 


 5.7 mg/dL


(8.5-10.1) 


 





 


Total Bilirubin


 


 0.2 mg/dL


(0.2-1.0) 


 





 


Aspartate Amino Transf


(AST/SGOT) 


 13 U/L (15-37) 


 


 





 


Alanine Aminotransferase


(ALT/SGPT) 


 < 6 U/L


(14-59) 


 





 


Alkaline Phosphatase


 


 72 U/L


() 


 





 


Total Protein


 


 4.0 g/dL


(6.4-8.2) 


 





 


Albumin


 


 0.9 g/dL


(3.4-5.0) 


 





 


Albumin/Globulin Ratio  0.3 (1.0-1.7)   


 


Test


 8/15/20


04:00 8/15/20


05:23 


 





 


White Blood Count


 13.6 x10^3/uL


(4.0-11.0) 


 


 





 


Red Blood Count


 3.13 x10^6/uL


(3.50-5.40) 


 


 





 


Hemoglobin


 8.6 g/dL


(12.0-15.5) 


 


 





 


Hematocrit


 27.6 %


(36.0-47.0) 


 


 





 


Mean Corpuscular Volume 88 fL ()    


 


Mean Corpuscular Hemoglobin 28 pg (25-35)    


 


Mean Corpuscular Hemoglobin


Concent 31 g/dL


(31-37) 


 


 





 


Red Cell Distribution Width


 20.2 %


(11.5-14.5) 


 


 





 


Platelet Count


 366 x10^3/uL


(140-400) 


 


 





 


Neutrophils (%) (Auto) 80 % (31-73)    


 


Lymphocytes (%) (Auto) 12 % (24-48)    


 


Monocytes (%) (Auto) 7 % (0-9)    


 


Eosinophils (%) (Auto) 1 % (0-3)    


 


Basophils (%) (Auto) 0 % (0-3)    


 


Neutrophils # (Auto)


 10.9 x10^3/uL


(1.8-7.7) 


 


 





 


Lymphocytes # (Auto)


 1.6 x10^3/uL


(1.0-4.8) 


 


 





 


Monocytes # (Auto)


 0.9 x10^3/uL


(0.0-1.1) 


 


 





 


Eosinophils # (Auto)


 0.1 x10^3/uL


(0.0-0.7) 


 


 





 


Basophils # (Auto)


 0.0 x10^3/uL


(0.0-0.2) 


 


 





 


Sodium Level


 152 mmol/L


(136-145) 


 


 





 


Potassium Level


 3.8 mmol/L


(3.5-5.1) 


 


 





 


Chloride Level


 119 mmol/L


() 


 


 





 


Carbon Dioxide Level


 19 mmol/L


(21-32) 


 


 





 


Anion Gap 14 (6-14)    


 


Blood Urea Nitrogen


 61 mg/dL


(7-20) 


 


 





 


Creatinine


 2.0 mg/dL


(0.6-1.0) 


 


 





 


Estimated GFR


(Cockcroft-Gault) 26.5 


 


 


 





 


BUN/Creatinine Ratio 31 (6-20)    


 


Glucose Level


 180 mg/dL


(70-99) 


 


 





 


Calcium Level


 6.8 mg/dL


(8.5-10.1) 


 


 





 


Total Bilirubin


 0.2 mg/dL


(0.2-1.0) 


 


 





 


Aspartate Amino Transf


(AST/SGOT) 16 U/L (15-37) 


 


 


 





 


Alanine Aminotransferase


(ALT/SGPT) 8 U/L (14-59) 


 


 


 





 


Alkaline Phosphatase


 83 U/L


() 


 


 





 


Total Protein


 4.6 g/dL


(6.4-8.2) 


 


 





 


Albumin


 1.0 g/dL


(3.4-5.0) 


 


 





 


Albumin/Globulin Ratio 0.3 (1.0-1.7)    


 


Glucose (Fingerstick)


 


 155 mg/dL


(70-99) 


 











Micro








Objective


Assessment


Patient with prolonged hospitalization more than 3 months


Multiple medical problems


Multiple surgical procedures








Intermittent fevers now improving


Pyruia from 8/10


S/P Exp. 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 

parapsilosis & PSAE (I-merrem/ceftazidime/AZT/cefepime), treated


Leukocytosis - better


Loose stool on tube feed - C. diff neg 7/25, 8/12


Anemia


Acute gallstone pancreatitis with persistent necrosis


  - 4/9.  CT A/P Increased ascites. Persistent evidence of necrotizing 

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


  - 4/27. status post ROBERT drain placement; C. parapsilosis. s/p drain 5/6 + yeast

& high amylase; s/p additional drain on 5/8. Drains removed. 


  -5/6. fluid  candida parapsilosis fluid, amylase high


  - 6/6 showed multiple pseudocysts, slight larger on the right. s/p drains x 3,

6/7.  PSAE (MDRO-R Cefepime, Zosyn ANSON < 64) and yeast, 


  -6/7 s/p drain replacement x 3; fluid cult PSAE (MDRO), yeast; treated


  -7/12 CT A/P shows smaller fluid collections.  


  -722 CT abdomen and pelvis drains in place       


Ascites s/p paracentesis 4/15 & 5/6. C. parapsilosis 


Cholelithiasis with thickening of the gallbladder wall.


JED with electrolyte imbalances. h/o HD 


Acute hypoxic resp failure. trach/vent. sputum 6/13  PSAE (I merrem) ; sputum 

culture July 19+ for PSAE R Merrem, sensitive to cefepime


Pleural effusion status post CTS left side


 Abdominal fluid culture 6 /7 MDRO Pseudomonas, yeast


Sputum culture positive 7/19 for MDRO Pseudomonas


Chest tube fluid positive for 7/21 Candida Parapsilosis


EC fistula


Severe PCM


Critical illness myopathy


Gen debility


Last urine culture Candida parapsilosis. [place] changed


Diarrhea C. difficile negative


Encephalopathy





Plan


Plan of Care


Continue cefepime, renal dosing as needed and flagyl 8/10,


C diff neg 8/12


BC negative so far


Follow-up cultures and monitor labs


[place] changed 8/11


Nystatin to groin


Right upper extremity PICC line placed 7/31


Wound care /drain management as directed


Contact isolation for CRE/MDRO








Long term prognosis  poor





D/w nursing








 Attending Co-Sign 


The patient was seen and examined at the bedside. The chart was reviewed.Agree 

with the plan of care.











HAVEN WINTERS        Aug 15, 2020 10:21


IVAN FRANZ MD           Aug 15, 2020 13:13

## 2020-08-15 NOTE — PDOC
PROGRESS NOTES


Date of Service:


DATE: 8/15/20 


TIME: 12:47





Chief Complaint


Chief Complaint


S/P Exp. 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 

parapsilosis & PSAE 07/26 (I-merrem/ceftazidime/AZT/cefepime))


Leucocytosis 


Fevers, intermittent


Acute gallstone pancreatitis with persistent necrosis


Acute hypoxic Respiratory failure required mechanical ventilation


Tracheostomy 


Bilateral pleural effusions/pulm edema s/p Throacentesis on 6/15/2020


HTN 


Intractable pain


Intractable nausea


Covid 19 negative


Acute on chronic anemia 


Abd distention - U/S and CT reviewed s/p 0.4 L of opaque, debris-containing 

ascites was removed 5/6


Gallstone pancreatitis with necrosis. 


   -CT A/P 6/6 showed multiple pseudocysts, slight larger on the right. s/p 

drains x 3, 6/7.  + PSAE (MDRO-R Cefepime, Zosyn ANSON < 64) and yeast, 


   -s/p drain 4/27. C. parapsilosis. s/p drain 5/6 + yeast & high amylase; s/p 

additional drain on 5/8. Drains removed. 


Ascites s/p paracentesis 4/15 & 5/6. C. parapsilosis 


JED. off HD. 


A large fluid collection in the pancreatic bed has slightly decreased in size, 

described below, the pancreas itself is difficult to  visualize, which could be 

due to necrosis or obscuration of pancreatic  parenchyma from the surrounding 

fluid collection.6/15 


- 4/27 status post ROBERT drain placement + C paropsilosis. s/p additional drains 

5/8


Hypocalcemia 


Prediabetes


s/p trach


Hyperglycemia


Severe protein-caloric malnutrition


Extensive retroperitoneal fluid collections persist. Percutaneous  drains remain

within the collections in both paracolic gutters. These  communicate with 

additional pelvic and peripancreatic collections.





History of Present Illness


History of Present Illness


8/15  Patient without complaints. Per nurse reports some hypotension this am 

that improved spontaneously.


8/14  Will check Hb/Hct levels today in response to prbc; patient largely 

nonverbal but denies pain


8/13,   increased IV fluid, cont current


bolusing daily ,  LR increased


cr better,    Hgb worse today, will give 1 u PRBC





8/12.   pain in legs,    BP better,  some better Urine outptu





8/11.   dyspnea and mild distress worsened this AM.  but was able to walk some 

with PT later.


still  sometimes,    








08/10/2020


Patient seen and examined


Afebrile


Resting in NAD


Tachycardic and tachypneic overnight


Recurring leukocytosis, 23.0


Chart reviewed


Discussed with RN





08/9: Afebrile.  Calcium down to 10.1.  Little more tachycardic today with some 

more secretions. No O2 requirements. Does not wish to wear her speaking valve. 

Will check CXR.


8/8: Afebrile, weak, having more secretions not wearing a speaking valve today. 

WBC 10, Hb 8.8, , and K3.7, BUN 12, CR 0.5, calcium down to 10.3.  After 

zoledronic acid


8/7: Had a rash after calcitonin injection.  Discussed with nephrology with her 

calcium moving from 11.3-10.9 will give IV bisphosphonate today, zoledronic 

acid.


8/6: Hb stable. Afebrile. Weak. Calcium increased. Shortness of breath is 

improving. Plan for calcitonin today, lasix, and saline


8/5: Hemoglobin abnormally low on repeat has been stable 7.2.  Calcium up to 

10.9.  She is able to work with PT, she is asking to eat.  Social work is been 

having difficulties with both of her daughters completing the financial aspect 

of disability paperwork which is been prolonging her stay over the past 5 

months.  Plan to check PTH and to treat hypercalcemia with 1 L normal saline 40 

mg of IV Lasix and repeat labs in a.m.


8/4: Not wishing to wear her speaking valve. J tube having some difficulties. 

Afebrile. Rolf bedside to aid her. transferred from ICU today


8/3: No overnight events. Calcium 10.7 on AM labs. She is still a bit slow to to

respond, but follows commands well. Does not want speaking valve with me. Pain 

is better controlled in her abdomen. Wound care to see today. Will check liver 

function and phos levels given her calcium elevation.


8/2: Patient seen and examined bedside.  Positive fluid balance in the past 24 

hours.  Patient's chart, labs, images were reviewed and discussed with RN


8/1: No acute events overnight.  Seen and examined at bedside.  Patient had a 

fever 100.2.  Negative fluid balance of 150 cc.  Patient's chart, labs, images w

ere reviewed and discussed with RN


7/31: Patient seen and examined at bedside.  No acute events overnight.  

Positive fluid balance 633.  Patient's chart, labs, images were reviewed and 

discussed with RN


7/30: Patient seen and examined bedside.  No acute events overnight.  Patient's 

chart, labs, images were reviewed and discussed with RN


7/28: Patient seen and examined bedside.  Patient appears to be in no obvious 

pain.  All drains have been adequately draining.  Patient continues to be on 

TPN.  Chart labs and imaging was reviewed.  Negative fluid balance of 270 cc


7/27:Patient seen and examined in the ICU.  Patient still requires extensive 

care.  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  Chart, labs, imaging was

reviewed.  UOP with + 500cc balance.


7/25: Patient seen in and examined in the ICU. She is still extremely critically

ill. Appears weak, frail, and pale. Better color today with improved eye contact

and expression. Discussed with RN. Chart reviewed


7/21: Patient seen and examined in the ICU, She is still extremely critically 

ill, Appears extremely weak frail and pale, Discussed with RN, Chart reviewed





Vitals


Vitals





Vital Signs








  Date Time  Temp Pulse Resp B/P (MAP) Pulse Ox O2 Delivery O2 Flow Rate FiO2


 


8/15/20 11:44      Nasal Cannula 2.0 


 


8/15/20 10:59 97.8 127 20 117/68 (84) 98   





 97.8       











Physical Exam


Physical Exam


GENERAL: Resting in bed, NAD


HEENT: Pupils equal, oral cavity dry. OC/Op - Dry


NECK:  Tracheostomy capped


LUNGS: Diminished aeration bases,


HEART:  S1, S2, 


ABDOMEN: Less distended, mild- bowel sounds hypoactive, soft, GJ drain present, 

drainage bag in place with depedent drainage


: [place] in place 


EXTREMITIES: Trace generalized edema, no cyanosis. Yeast in groin area


SKIN: warm touch. No signs of rash.  


NEURO: - Alert and responsive


RUE  PICC site without signs of complications. PIV s clean


EC fistula


General:  Cooperative, mild distress


Heart:  Normal S1, Normal S2, Other


Lungs:  Clear


Abdomen:  Normal bowel sounds, Soft, No tenderness


Extremities:  No clubbing, No cyanosis, Other (Diffuse edema)


Skin:  No breakdown





Labs


LABS





Laboratory Tests








Test


 8/14/20


14:00 8/14/20


17:31 8/15/20


00:23 8/15/20


04:00


 


White Blood Count


 10.0 x10^3/uL


(4.0-11.0) 


 


 13.6 x10^3/uL


(4.0-11.0)


 


Red Blood Count


 2.83 x10^6/uL


(3.50-5.40) 


 


 3.13 x10^6/uL


(3.50-5.40)


 


Hemoglobin


 7.9 g/dL


(12.0-15.5) 


 


 8.6 g/dL


(12.0-15.5)


 


Hematocrit


 25.7 %


(36.0-47.0) 


 


 27.6 %


(36.0-47.0)


 


Mean Corpuscular Volume 91 fL ()    88 fL () 


 


Mean Corpuscular Hemoglobin 28 pg (25-35)    28 pg (25-35) 


 


Mean Corpuscular Hemoglobin


Concent 31 g/dL


(31-37) 


 


 31 g/dL


(31-37)


 


Red Cell Distribution Width


 20.7 %


(11.5-14.5) 


 


 20.2 %


(11.5-14.5)


 


Platelet Count


 320 x10^3/uL


(140-400) 


 


 366 x10^3/uL


(140-400)


 


Neutrophils (%) (Auto) 77 % (31-73)    80 % (31-73) 


 


Lymphocytes (%) (Auto) 14 % (24-48)    12 % (24-48) 


 


Monocytes (%) (Auto) 8 % (0-9)    7 % (0-9) 


 


Eosinophils (%) (Auto) 1 % (0-3)    1 % (0-3) 


 


Basophils (%) (Auto) 0 % (0-3)    0 % (0-3) 


 


Neutrophils # (Auto)


 7.7 x10^3/uL


(1.8-7.7) 


 


 10.9 x10^3/uL


(1.8-7.7)


 


Lymphocytes # (Auto)


 1.4 x10^3/uL


(1.0-4.8) 


 


 1.6 x10^3/uL


(1.0-4.8)


 


Monocytes # (Auto)


 0.8 x10^3/uL


(0.0-1.1) 


 


 0.9 x10^3/uL


(0.0-1.1)


 


Eosinophils # (Auto)


 0.1 x10^3/uL


(0.0-0.7) 


 


 0.1 x10^3/uL


(0.0-0.7)


 


Basophils # (Auto)


 0.0 x10^3/uL


(0.0-0.2) 


 


 0.0 x10^3/uL


(0.0-0.2)


 


Sodium Level


 153 mmol/L


(136-145) 


 


 152 mmol/L


(136-145)


 


Potassium Level


 2.9 mmol/L


(3.5-5.1) 


 


 3.8 mmol/L


(3.5-5.1)


 


Chloride Level


 124 mmol/L


() 


 


 119 mmol/L


()


 


Carbon Dioxide Level


 17 mmol/L


(21-32) 


 


 19 mmol/L


(21-32)


 


Anion Gap 12 (6-14)    14 (6-14) 


 


Blood Urea Nitrogen


 58 mg/dL


(7-20) 


 


 61 mg/dL


(7-20)


 


Creatinine


 1.7 mg/dL


(0.6-1.0) 


 


 2.0 mg/dL


(0.6-1.0)


 


Estimated GFR


(Cockcroft-Gault) 31.9 


 


 


 26.5 





 


BUN/Creatinine Ratio 34 (6-20)    31 (6-20) 


 


Glucose Level


 171 mg/dL


(70-99) 


 


 180 mg/dL


(70-99)


 


Calcium Level


 5.7 mg/dL


(8.5-10.1) 


 


 6.8 mg/dL


(8.5-10.1)


 


Total Bilirubin


 0.2 mg/dL


(0.2-1.0) 


 


 0.2 mg/dL


(0.2-1.0)


 


Aspartate Amino Transf


(AST/SGOT) 13 U/L (15-37) 


 


 


 16 U/L (15-37) 





 


Alanine Aminotransferase


(ALT/SGPT) < 6 U/L


(14-59) 


 


 8 U/L (14-59) 





 


Alkaline Phosphatase


 72 U/L


() 


 


 83 U/L


()


 


Total Protein


 4.0 g/dL


(6.4-8.2) 


 


 4.6 g/dL


(6.4-8.2)


 


Albumin


 0.9 g/dL


(3.4-5.0) 


 


 1.0 g/dL


(3.4-5.0)


 


Albumin/Globulin Ratio 0.3 (1.0-1.7)    0.3 (1.0-1.7) 


 


Glucose (Fingerstick)


 


 125 mg/dL


(70-99) 161 mg/dL


(70-99) 





 


Test


 8/15/20


05:23 8/15/20


12:38 


 





 


Glucose (Fingerstick)


 155 mg/dL


(70-99) 167 mg/dL


(70-99) 


 














Review of Systems


Review of Systems


Unable to obtain due to clinical condition.





Assessment and Plan


Assessmemt and Plan


Problems


Medical Problems:


(1) Acute pancreatitis


Status: Acute  





(2) Cholelithiasis


Status: Acute  











Comment


Review of Relevant


I have reviewed the following items josy (where applicable) has been applied.


Labs





Laboratory Tests








Test


 8/13/20


17:51 8/13/20


22:54 8/14/20


07:22 8/14/20


12:11


 


Glucose (Fingerstick)


 167 mg/dL


(70-99) 150 mg/dL


(70-99) 205 mg/dL


(70-99) 208 mg/dL


(70-99)


 


Test


 8/14/20


14:00 8/14/20


17:31 8/15/20


00:23 8/15/20


04:00


 


White Blood Count


 10.0 x10^3/uL


(4.0-11.0) 


 


 13.6 x10^3/uL


(4.0-11.0)


 


Red Blood Count


 2.83 x10^6/uL


(3.50-5.40) 


 


 3.13 x10^6/uL


(3.50-5.40)


 


Hemoglobin


 7.9 g/dL


(12.0-15.5) 


 


 8.6 g/dL


(12.0-15.5)


 


Hematocrit


 25.7 %


(36.0-47.0) 


 


 27.6 %


(36.0-47.0)


 


Mean Corpuscular Volume 91 fL ()    88 fL () 


 


Mean Corpuscular Hemoglobin 28 pg (25-35)    28 pg (25-35) 


 


Mean Corpuscular Hemoglobin


Concent 31 g/dL


(31-37) 


 


 31 g/dL


(31-37)


 


Red Cell Distribution Width


 20.7 %


(11.5-14.5) 


 


 20.2 %


(11.5-14.5)


 


Platelet Count


 320 x10^3/uL


(140-400) 


 


 366 x10^3/uL


(140-400)


 


Neutrophils (%) (Auto) 77 % (31-73)    80 % (31-73) 


 


Lymphocytes (%) (Auto) 14 % (24-48)    12 % (24-48) 


 


Monocytes (%) (Auto) 8 % (0-9)    7 % (0-9) 


 


Eosinophils (%) (Auto) 1 % (0-3)    1 % (0-3) 


 


Basophils (%) (Auto) 0 % (0-3)    0 % (0-3) 


 


Neutrophils # (Auto)


 7.7 x10^3/uL


(1.8-7.7) 


 


 10.9 x10^3/uL


(1.8-7.7)


 


Lymphocytes # (Auto)


 1.4 x10^3/uL


(1.0-4.8) 


 


 1.6 x10^3/uL


(1.0-4.8)


 


Monocytes # (Auto)


 0.8 x10^3/uL


(0.0-1.1) 


 


 0.9 x10^3/uL


(0.0-1.1)


 


Eosinophils # (Auto)


 0.1 x10^3/uL


(0.0-0.7) 


 


 0.1 x10^3/uL


(0.0-0.7)


 


Basophils # (Auto)


 0.0 x10^3/uL


(0.0-0.2) 


 


 0.0 x10^3/uL


(0.0-0.2)


 


Sodium Level


 153 mmol/L


(136-145) 


 


 152 mmol/L


(136-145)


 


Potassium Level


 2.9 mmol/L


(3.5-5.1) 


 


 3.8 mmol/L


(3.5-5.1)


 


Chloride Level


 124 mmol/L


() 


 


 119 mmol/L


()


 


Carbon Dioxide Level


 17 mmol/L


(21-32) 


 


 19 mmol/L


(21-32)


 


Anion Gap 12 (6-14)    14 (6-14) 


 


Blood Urea Nitrogen


 58 mg/dL


(7-20) 


 


 61 mg/dL


(7-20)


 


Creatinine


 1.7 mg/dL


(0.6-1.0) 


 


 2.0 mg/dL


(0.6-1.0)


 


Estimated GFR


(Cockcroft-Gault) 31.9 


 


 


 26.5 





 


BUN/Creatinine Ratio 34 (6-20)    31 (6-20) 


 


Glucose Level


 171 mg/dL


(70-99) 


 


 180 mg/dL


(70-99)


 


Calcium Level


 5.7 mg/dL


(8.5-10.1) 


 


 6.8 mg/dL


(8.5-10.1)


 


Total Bilirubin


 0.2 mg/dL


(0.2-1.0) 


 


 0.2 mg/dL


(0.2-1.0)


 


Aspartate Amino Transf


(AST/SGOT) 13 U/L (15-37) 


 


 


 16 U/L (15-37) 





 


Alanine Aminotransferase


(ALT/SGPT) < 6 U/L


(14-59) 


 


 8 U/L (14-59) 





 


Alkaline Phosphatase


 72 U/L


() 


 


 83 U/L


()


 


Total Protein


 4.0 g/dL


(6.4-8.2) 


 


 4.6 g/dL


(6.4-8.2)


 


Albumin


 0.9 g/dL


(3.4-5.0) 


 


 1.0 g/dL


(3.4-5.0)


 


Albumin/Globulin Ratio 0.3 (1.0-1.7)    0.3 (1.0-1.7) 


 


Glucose (Fingerstick)


 


 125 mg/dL


(70-99) 161 mg/dL


(70-99) 





 


Test


 8/15/20


05:23 8/15/20


12:38 


 





 


Glucose (Fingerstick)


 155 mg/dL


(70-99) 167 mg/dL


(70-99) 


 











Laboratory Tests








Test


 8/14/20


14:00 8/14/20


17:31 8/15/20


00:23 8/15/20


04:00


 


White Blood Count


 10.0 x10^3/uL


(4.0-11.0) 


 


 13.6 x10^3/uL


(4.0-11.0)


 


Red Blood Count


 2.83 x10^6/uL


(3.50-5.40) 


 


 3.13 x10^6/uL


(3.50-5.40)


 


Hemoglobin


 7.9 g/dL


(12.0-15.5) 


 


 8.6 g/dL


(12.0-15.5)


 


Hematocrit


 25.7 %


(36.0-47.0) 


 


 27.6 %


(36.0-47.0)


 


Mean Corpuscular Volume 91 fL ()    88 fL () 


 


Mean Corpuscular Hemoglobin 28 pg (25-35)    28 pg (25-35) 


 


Mean Corpuscular Hemoglobin


Concent 31 g/dL


(31-37) 


 


 31 g/dL


(31-37)


 


Red Cell Distribution Width


 20.7 %


(11.5-14.5) 


 


 20.2 %


(11.5-14.5)


 


Platelet Count


 320 x10^3/uL


(140-400) 


 


 366 x10^3/uL


(140-400)


 


Neutrophils (%) (Auto) 77 % (31-73)    80 % (31-73) 


 


Lymphocytes (%) (Auto) 14 % (24-48)    12 % (24-48) 


 


Monocytes (%) (Auto) 8 % (0-9)    7 % (0-9) 


 


Eosinophils (%) (Auto) 1 % (0-3)    1 % (0-3) 


 


Basophils (%) (Auto) 0 % (0-3)    0 % (0-3) 


 


Neutrophils # (Auto)


 7.7 x10^3/uL


(1.8-7.7) 


 


 10.9 x10^3/uL


(1.8-7.7)


 


Lymphocytes # (Auto)


 1.4 x10^3/uL


(1.0-4.8) 


 


 1.6 x10^3/uL


(1.0-4.8)


 


Monocytes # (Auto)


 0.8 x10^3/uL


(0.0-1.1) 


 


 0.9 x10^3/uL


(0.0-1.1)


 


Eosinophils # (Auto)


 0.1 x10^3/uL


(0.0-0.7) 


 


 0.1 x10^3/uL


(0.0-0.7)


 


Basophils # (Auto)


 0.0 x10^3/uL


(0.0-0.2) 


 


 0.0 x10^3/uL


(0.0-0.2)


 


Sodium Level


 153 mmol/L


(136-145) 


 


 152 mmol/L


(136-145)


 


Potassium Level


 2.9 mmol/L


(3.5-5.1) 


 


 3.8 mmol/L


(3.5-5.1)


 


Chloride Level


 124 mmol/L


() 


 


 119 mmol/L


()


 


Carbon Dioxide Level


 17 mmol/L


(21-32) 


 


 19 mmol/L


(21-32)


 


Anion Gap 12 (6-14)    14 (6-14) 


 


Blood Urea Nitrogen


 58 mg/dL


(7-20) 


 


 61 mg/dL


(7-20)


 


Creatinine


 1.7 mg/dL


(0.6-1.0) 


 


 2.0 mg/dL


(0.6-1.0)


 


Estimated GFR


(Cockcroft-Gault) 31.9 


 


 


 26.5 





 


BUN/Creatinine Ratio 34 (6-20)    31 (6-20) 


 


Glucose Level


 171 mg/dL


(70-99) 


 


 180 mg/dL


(70-99)


 


Calcium Level


 5.7 mg/dL


(8.5-10.1) 


 


 6.8 mg/dL


(8.5-10.1)


 


Total Bilirubin


 0.2 mg/dL


(0.2-1.0) 


 


 0.2 mg/dL


(0.2-1.0)


 


Aspartate Amino Transf


(AST/SGOT) 13 U/L (15-37) 


 


 


 16 U/L (15-37) 





 


Alanine Aminotransferase


(ALT/SGPT) < 6 U/L


(14-59) 


 


 8 U/L (14-59) 





 


Alkaline Phosphatase


 72 U/L


() 


 


 83 U/L


()


 


Total Protein


 4.0 g/dL


(6.4-8.2) 


 


 4.6 g/dL


(6.4-8.2)


 


Albumin


 0.9 g/dL


(3.4-5.0) 


 


 1.0 g/dL


(3.4-5.0)


 


Albumin/Globulin Ratio 0.3 (1.0-1.7)    0.3 (1.0-1.7) 


 


Glucose (Fingerstick)


 


 125 mg/dL


(70-99) 161 mg/dL


(70-99) 





 


Test


 8/15/20


05:23 8/15/20


12:38 


 





 


Glucose (Fingerstick)


 155 mg/dL


(70-99) 167 mg/dL


(70-99) 


 











Microbiology


8/10/20 Urine Culture - Final, Complete


          


8/10/20 Blood Culture - Final, Complete


          NO GROWTH AFTER 5 DAYS


7/26/20 Gram Stain - Final, Complete


          


7/26/20 Aerobic Culture - Final, Complete


          


7/21/20 Gram Stain - Final, Complete


          


7/21/20 Aerobic and Anaerobic Culture - Final, Complete


          


7/19/20 Gram Stain Evaluation - Final, Complete


          


7/19/20 Respiratory Culture - Final, Complete


          


7/19/20 Antimicrobic Susceptibility - Final, Complete


          


6/30/20 Gram Stain - Final, Complete


          


6/30/20 Aerobic and Anaerobic Culture - Final, Complete


          


6/30/20 Antimicrobic Susceptibility - Final, Complete


Medications





Current Medications


Sodium Chloride 1,000 ml @  1,000 mls/hr Q1H IV  Last administered on 3/16/20at 

03:00;  Start 3/16/20 at 03:00;  Stop 3/16/20 at 03:59;  Status DC


Ondansetron HCl (Zofran) 4 mg 1X  ONCE IVP  Last administered on 3/16/20at 

03:27;  Start 3/16/20 at 03:00;  Stop 3/16/20 at 03:01;  Status DC


Morphine Sulfate (Morphine Sulfate) 4 mg 1X  ONCE IV ;  Start 3/16/20 at 03:00; 

Stop 3/16/20 at 03:01;  Status Cancel


Ketorolac Tromethamine (Toradol 30mg Vial) 30 mg 1X  ONCE IV  Last administered 

on 3/16/20at 02:54;  Start 3/16/20 at 03:00;  Stop 3/16/20 at 03:01;  Status DC


Fentanyl Citrate (Fentanyl 2ml Vial) 25 mcg 1X  ONCE IVP  Last administered on 

3/16/20at 03:23;  Start 3/16/20 at 03:30;  Stop 3/16/20 at 03:31;  Status DC


Fentanyl Citrate (Fentanyl 2ml Vial) 100 mcg STK-MED ONCE .ROUTE ;  Start 

3/16/20 at 03:18;  Stop 3/16/20 at 03:18;  Status DC


Iohexol (Omnipaque 350 Mg/ml) 90 ml 1X  ONCE IV  Last administered on 3/16/20at 

03:25;  Start 3/16/20 at 03:30;  Stop 3/16/20 at 03:31;  Status DC


Info (CONTRAST GIVEN -- Rx MONITORING) 1 each PRN DAILY  PRN MC SEE COMMENTS;  

Start 3/16/20 at 03:30;  Stop 3/18/20 at 03:29;  Status DC


Hydromorphone HCl (Dilaudid) 0.5 mg 1X  ONCE IV  Last administered on 3/16/20at 

03:55;  Start 3/16/20 at 04:30;  Stop 3/16/20 at 04:32;  Status DC


Ondansetron HCl (Zofran) 4 mg PRN Q8HRS  PRN IV NAUSEA/VOMITING 1ST CHOICE;  

Start 3/16/20 at 05:00;  Stop 3/16/20 at 09:27;  Status DC


Morphine Sulfate (Morphine Sulfate) 2 mg PRN Q2HR  PRN IV SEVERE PAIN 7-10 Last 

administered on 3/17/20at 12:26;  Start 3/16/20 at 05:00;  Stop 3/17/20 at 

14:15;  Status DC


Sodium Chloride 1,000 ml @  125 mls/hr Q8H IV  Last administered on 3/16/20at 

20:56;  Start 3/16/20 at 05:00;  Stop 3/17/20 at 04:59;  Status DC


Hydromorphone HCl (Dilaudid) 0.5 mg PRN Q3HRS  PRN IV SEVERE PAIN 7-10 Last 

administered on 3/17/20at 10:06;  Start 3/16/20 at 05:00;  Stop 3/17/20 at 

12:01;  Status DC


Piperacillin Sod/ Tazobactam Sod 4.5 gm/Sodium Chloride 100 ml @  200 mls/hr 1X 

ONCE IV  Last administered on 3/16/20at 05:44;  Start 3/16/20 at 06:00;  Stop 

3/16/20 at 06:29;  Status DC


Ondansetron HCl (Zofran) 4 mg PRN Q4HRS  PRN IV NAUSEA/VOMITING 1ST CHOICE Last 

administered on 8/12/20at 12:43;  Start 3/16/20 at 09:30


Insulin Human Lispro (HumaLOG) 0-9 UNITS Q6HRS SQ  Last administered on 

8/14/20at 12:43;  Start 3/16/20 at 09:30


Dextrose (Dextrose 50%-Water Syringe) 12.5 gm PRN Q15MIN  PRN IV SEE COMMENTS;  

Start 3/16/20 at 09:30


Pantoprazole Sodium (PROTONIX VIAL for IV PUSH) 40 mg DAILYAC IVP  Last 

administered on 8/15/20at 08:40;  Start 3/16/20 at 11:30


Prochlorperazine Edisylate (Compazine) 10 mg PRN Q6HRS  PRN IV NAUSEA/VOMITING, 

2nd CHOICE Last administered on 8/10/20at 00:42;  Start 3/16/20 at 17:45


Atenolol (Tenormin) 100 mg DAILY PO ;  Start 3/17/20 at 09:00;  Stop 3/16/20 at 

20:08;  Status DC


Metoprolol Tartrate (Lopressor Vial) 2.5 mg Q6HRS IVP  Last administered on 

3/17/20at 05:51;  Start 3/16/20 at 20:15;  Stop 3/17/20 at 10:02;  Status DC


Metoprolol Tartrate (Lopressor Vial) 5 mg Q6HRS IVP  Last administered on 

3/26/20at 00:12;  Start 3/17/20 at 10:15;  Stop 3/28/20 at 08:48;  Status DC


Hydromorphone HCl (Dilaudid) 1 mg PRN Q3HRS  PRN IV SEVERE PAIN 7-10 Last 

administered on 3/23/20at 05:13;  Start 3/17/20 at 12:00;  Stop 3/31/20 at 

00:25;  Status DC


Lidocaine HCl (Buffered Lidocaine 1%) 3 ml STK-MED ONCE .ROUTE ;  Start 3/17/20 

at 12:55;  Stop 3/17/20 at 12:56;  Status DC


Albumin Human 500 ml @  125 mls/hr 1X  ONCE IV  Last administered on 3/17/20at 

14:33;  Start 3/17/20 at 14:30;  Stop 3/17/20 at 18:32;  Status DC


Norepinephrine Bitartrate 8 mg/ Dextrose 258 ml @  17.299 mls/ hr CONT  PRN IV 

PER PROTOCOL Last administered on 4/14/20at 12:48;  Start 3/17/20 at 15:30;  

Stop 4/17/20 at 09:19;  Status DC


Sodium Chloride 1,000 ml @  125 mls/hr Q8H IV  Last administered on 3/17/20at 

21:04;  Start 3/17/20 at 16:00;  Stop 3/18/20 at 02:42;  Status DC


Albumin Human 500 ml @  125 mls/hr PRN BID  PRN IV After every 2L NSS & BP < 

90mm Last administered on 6/30/20at 16:06;  Start 3/17/20 at 16:00;  Stop 7/3/20

at 09:30;  Status DC


Iohexol (Omnipaque 300 Mg/ml) 60 ml 1X  ONCE IV  Last administered on 3/17/20at 

17:20;  Start 3/17/20 at 17:00;  Stop 3/17/20 at 17:01;  Status DC


Info (CONTRAST GIVEN -- Rx MONITORING) 1 each PRN DAILY  PRN MC SEE COMMENTS;  

Start 3/17/20 at 17:00;  Stop 3/19/20 at 16:59;  Status DC


Meropenem 1 gm/ Sodium Chloride 100 ml @  200 mls/hr Q8HRS IV  Last administered

on 3/18/20at 05:45;  Start 3/17/20 at 20:00;  Stop 3/18/20 at 08:48;  Status DC


Furosemide (Lasix) 40 mg 1X  ONCE IVP  Last administered on 3/17/20at 22:12;  

Start 3/17/20 at 22:30;  Stop 3/17/20 at 22:31;  Status DC


Calcium Chloride 1000 mg/Sodium Chloride 110 ml @  220 mls/hr 1X  ONCE IV  Last 

administered on 3/17/20at 22:11;  Start 3/17/20 at 22:30;  Stop 3/17/20 at 

22:59;  Status DC


Albuterol Sulfate (Ventolin Neb Soln) 2.5 mg 1X  ONCE NEB  Last administered on 

3/18/20at 00:56;  Start 3/17/20 at 22:30;  Stop 3/17/20 at 22:31;  Status DC


Insulin Human Regular (HumuLIN R VIAL) 5 unit 1X  ONCE IV  Last administered on 

3/17/20at 22:14;  Start 3/17/20 at 22:30;  Stop 3/17/20 at 22:31;  Status DC


Magnesium Sulfate 50 ml @ 25 mls/hr 1X  ONCE IV  Last administered on 3/18/20at 

02:57;  Start 3/18/20 at 03:00;  Stop 3/18/20 at 04:59;  Status DC


Calcium Gluconate 1000 mg/Sodium Chloride 110 ml @  220 mls/hr 1X  ONCE IV  Last

administered on 3/18/20at 02:46;  Start 3/18/20 at 03:00;  Stop 3/18/20 at 

03:29;  Status DC


Sodium Chloride 1,000 ml @  200 mls/hr Q5H IV  Last administered on 3/18/20at 

02:46;  Start 3/18/20 at 03:00;  Stop 3/18/20 at 10:21;  Status DC


Calcium Gluconate 1000 mg/Sodium Chloride 110 ml @  220 mls/hr 1X  ONCE IV  Last

administered on 3/18/20at 03:21;  Start 3/18/20 at 03:30;  Stop 3/18/20 at 

03:59;  Status DC


Sodium Bicarbonate 50 meq/Sodium Chloride 1,050 ml @  75 mls/hr Q14H IV  Last 

administered on 3/22/20at 21:10;  Start 3/18/20 at 07:30;  Stop 3/23/20 at 

10:28;  Status DC


Calcium Gluconate 2000 mg/Sodium Chloride 120 ml @  220 mls/hr 1X  ONCE IV  Last

administered on 3/18/20at 09:05;  Start 3/18/20 at 07:30;  Stop 3/18/20 at 

08:02;  Status DC


Lidocaine HCl (Xylocaine-Mpf 1% 2ml Vial) 2 ml STK-MED ONCE .ROUTE ;  Start 

3/18/20 at 08:47;  Stop 3/18/20 at 08:47;  Status DC


Meropenem 500 mg/ Sodium Chloride 50 ml @  100 mls/hr Q12HR IV  Last 

administered on 3/23/20at 21:01;  Start 3/18/20 at 18:00;  Stop 3/24/20 at 

07:58;  Status DC


Lidocaine HCl (Buffered Lidocaine 1%) 3 ml STK-MED ONCE .ROUTE ;  Start 3/18/20 

at 09:46;  Stop 3/18/20 at 09:46;  Status DC


Lidocaine HCl (Buffered Lidocaine 1%) 6 ml 1X  ONCE INJ  Last administered on 

3/18/20at 10:26;  Start 3/18/20 at 10:15;  Stop 3/18/20 at 10:16;  Status DC


Info (Tpn Per Pharmacy) 1 each PRN DAILY  PRN MC SEE COMMENTS Last administered 

on 7/26/20at 08:31;  Start 3/18/20 at 12:00;  Stop 7/26/20 at 10:41;  Status DC


Sodium Chloride 1,000 ml @  1,000 mls/hr Q1H PRN IV hypotension;  Start 3/18/20 

at 12:07;  Stop 3/18/20 at 18:06;  Status DC


Diphenhydramine HCl (Benadryl) 25 mg 1X PRN  PRN IV ITCHING;  Start 3/18/20 at 

12:15;  Stop 3/19/20 at 12:14;  Status DC


Diphenhydramine HCl (Benadryl) 25 mg 1X PRN  PRN IV ITCHING;  Start 3/18/20 at 

12:15;  Stop 3/19/20 at 12:14;  Status DC


Sodium Chloride 1,000 ml @  400 mls/hr Q2H30M PRN IV PATENCY;  Start 3/18/20 at 

12:07;  Stop 3/19/20 at 00:06;  Status DC


Info (PHARMACY MONITORING -- do not chart) 1 each PRN DAILY  PRN MC SEE 

COMMENTS;  Start 3/18/20 at 12:15;  Stop 3/20/20 at 08:13;  Status DC


Sodium Chloride 90 meq/Calcium Gluconate 10 meq/ Multivitamins 10 ml/Chromium/ 

Copper/Manganese/ Seleni/Zn 1 ml/ Total Parenteral Nutrition/Amino 

Acids/Dextrose/ Fat Emulsion Intravenous 55.005 ml  @ 2.292 mls/hr TPN  CONT IV 

;  Start 3/18/20 at 22:00;  Stop 3/18/20 at 12:33;  Status DC


Info (Tpn Per Pharmacy) 1 each PRN DAILY  PRN MC SEE COMMENTS;  Start 3/18/20 at

12:30;  Status UNV


Sodium Chloride 90 meq/Calcium Gluconate 10 meq/ Multivitamins 10 ml/Chromium/ 

Copper/Manganese/ Seleni/Zn 0.5 ml/ Total Parenteral Nutrition/Amino 

Acids/Dextrose/ Fat Emulsion Intravenous 1,512 ml @  63 mls/hr TPN  CONT IV  

Last administered on 3/18/20at 22:06;  Start 3/18/20 at 22:00;  Stop 3/19/20 at 

21:59;  Status DC


Calcium Carbonate/ Glycine (Tums) 500 mg PRN AFTMEALHC  PRN PO INDIGESTION;  

Start 3/18/20 at 17:45;  Stop 5/13/20 at 10:25;  Status DC


Calcium Gluconate (Calcium Gluconate) 2,000 mg 1X  ONCE IVP  Last administered 

on 3/19/20at 02:19;  Start 3/19/20 at 02:15;  Stop 3/19/20 at 02:16;  Status DC


Calcium Chloride 3000 mg/Sodium Chloride 1,030 ml @  50 mls/hr [id number] IV  Last 

administered on 3/21/20at 02:17;  Start 3/19/20 at 08:00;  Stop 3/21/20 at 

15:23;  Status DC


Lorazepam (Ativan Inj) 1 mg PRN Q4HRS  PRN IVP ANXIETY / AGITATION, 2nd choic 

Last administered on 4/17/20at 03:51;  Start 3/19/20 at 09:00;  Stop 4/17/20 at 

09:19;  Status DC


Sodium Chloride 1,000 ml @  1,000 mls/hr Q1H PRN IV hypotension;  Start 3/19/20 

at 08:56;  Stop 3/19/20 at 14:55;  Status DC


Albumin Human 200 ml @  200 mls/hr 1X PRN  PRN IV Hypotension;  Start 3/19/20 at

09:00;  Stop 3/19/20 at 14:59;  Status DC


Diphenhydramine HCl (Benadryl) 25 mg 1X PRN  PRN IV ITCHING;  Start 3/19/20 at 

09:00;  Stop 3/20/20 at 08:59;  Status DC


Diphenhydramine HCl (Benadryl) 25 mg 1X PRN  PRN IV ITCHING;  Start 3/19/20 at 

09:00;  Stop 3/20/20 at 08:59;  Status DC


Sodium Chloride 1,000 ml @  400 mls/hr Q2H30M PRN IV PATENCY;  Start 3/19/20 at 

08:56;  Stop 3/19/20 at 20:55;  Status DC


Info (PHARMACY MONITORING -- do not chart) 1 each PRN DAILY  PRN MC SEE 

COMMENTS;  Start 3/19/20 at 09:00;  Status UNV


Info (PHARMACY MONITORING -- do not chart) 1 each PRN DAILY  PRN MC SEE 

COMMENTS;  Start 3/19/20 at 09:00;  Stop 3/20/20 at 08:13;  Status DC


Digoxin (Lanoxin) 500 mcg 1X  ONCE IV  Last administered on 3/19/20at 10:04;  

Start 3/19/20 at 10:00;  Stop 3/19/20 at 10:01;  Status DC


Digoxin (Lanoxin) 125 mcg 1X  ONCE IV  Last administered on 3/19/20at 17:10;  

Start 3/19/20 at 18:00;  Stop 3/19/20 at 18:01;  Status DC


Magnesium Sulfate 100 ml @  25 mls/hr 1X  ONCE IV  Last administered on 

3/19/20at 12:48;  Start 3/19/20 at 13:00;  Stop 3/19/20 at 16:59;  Status DC


Sodium Chloride 90 meq/Magnesium Sulfate 10 meq/ Calcium Gluconate 20 meq/ 

Multivitamins 10 ml/Chromium/ Copper/Manganese/ Seleni/Zn 0.5 ml/ Total 

Parenteral Nutrition/Amino Acids/Dextrose/ Fat Emulsion Intravenous 1,512 ml @  

63 mls/hr TPN  CONT IV  Last administered on 3/19/20at 22:25;  Start 3/19/20 at 

22:00;  Stop 3/20/20 at 21:59;  Status DC


Sodium Chloride 1,000 ml @  1,000 mls/hr Q1H PRN IV hypotension;  Start 3/20/20 

at 08:05;  Stop 3/20/20 at 14:04;  Status DC


Albumin Human 200 ml @  200 mls/hr 1X  ONCE IV  Last administered on 3/20/20at 

08:57;  Start 3/20/20 at 08:15;  Stop 3/20/20 at 09:14;  Status DC


Diphenhydramine HCl (Benadryl) 25 mg 1X PRN  PRN IV ITCHING;  Start 3/20/20 at 

08:15;  Stop 3/21/20 at 08:14;  Status DC


Diphenhydramine HCl (Benadryl) 25 mg 1X PRN  PRN IV ITCHING;  Start 3/20/20 at 

08:15;  Stop 3/21/20 at 08:14;  Status DC


Sodium Chloride 1,000 ml @  400 mls/hr Q2H30M PRN IV PATENCY;  Start 3/20/20 at 

08:05;  Stop 3/20/20 at 20:04;  Status DC


Info (PHARMACY MONITORING -- do not chart) 1 each PRN DAILY  PRN MC SEE 

COMMENTS;  Start 3/20/20 at 08:15;  Stop 3/24/20 at 07:57;  Status DC


Sodium Chloride 90 meq/Potassium Chloride 15 meq/ Potassium Phosphate 10 mmol/ 

Magnesium Sulfate 10 meq/Calcium Gluconate 20 meq/ Multivitamins 10 ml/Chromium/

Copper/Manganese/ Seleni/Zn 0.5 ml/ Total Parenteral Nutrition/Amino 

Acids/Dextrose/ Fat Emulsion Intravenous 1,512 ml @  63 mls/hr TPN  CONT IV  

Last administered on 3/20/20at 21:01;  Start 3/20/20 at 22:00;  Stop 3/21/20 at 

21:59;  Status DC


Potassium Chloride/Water 100 ml @  100 mls/hr 1X  ONCE IV  Last administered on 

3/20/20at 14:09;  Start 3/20/20 at 14:00;  Stop 3/20/20 at 14:59;  Status DC


Benzocaine (Hurricaine One) 1 spray 1X  ONCE MM  Last administered on 3/20/20at 

16:38;  Start 3/20/20 at 14:30;  Stop 3/20/20 at 14:31;  Status DC


Lidocaine HCl (Glydo (Lidocaine) Jelly) 1 thomas 1X  ONCE MM  Last administered on 

3/20/20at 16:38;  Start 3/20/20 at 14:30;  Stop 3/20/20 at 14:31;  Status DC


Linezolid/Dextrose 300 ml @  300 mls/hr Q12HR IV  Last administered on 3/26/20at

21:04;  Start 3/20/20 at 20:00;  Stop 3/27/20 at 07:50;  Status DC


Acetaminophen (Tylenol) 650 mg PRN Q6HRS  PRN PO MILD PAIN / TEMP;  Start 

3/21/20 at 03:30;  Stop 3/21/20 at 03:36;  Status DC


Acetaminophen (Tylenol) 650 mg PRN Q6HRS  PRN PEG MILD PAIN / TEMP Last 

administered on 4/16/20at 19:56;  Start 3/21/20 at 03:36;  Stop 5/13/20 at 

10:25;  Status DC


Sodium Chloride 1,000 ml @  1,000 mls/hr Q1H PRN IV hypotension;  Start 3/21/20 

at 07:50;  Stop 3/21/20 at 13:49;  Status DC


Albumin Human 200 ml @  200 mls/hr 1X PRN  PRN IV Hypotension;  Start 3/21/20 at

08:00;  Stop 3/21/20 at 13:59;  Status DC


Sodium Chloride (Normal Saline Flush) 10 ml 1X PRN  PRN IV AP catheter pack;  

Start 3/21/20 at 08:00;  Stop 3/22/20 at 07:59;  Status DC


Sodium Chloride (Normal Saline Flush) 10 ml 1X PRN  PRN IV  catheter pack;  

Start 3/21/20 at 08:00;  Stop 3/22/20 at 07:59;  Status DC


Sodium Chloride 1,000 ml @  400 mls/hr Q2H30M PRN IV PATENCY;  Start 3/21/20 at 

07:50;  Stop 3/21/20 at 19:49;  Status DC


Info (PHARMACY MONITORING -- do not chart) 1 each PRN DAILY  PRN MC SEE 

COMMENTS;  Start 3/21/20 at 08:00;  Status UNV


Info (PHARMACY MONITORING -- do not chart) 1 each PRN DAILY  PRN MC SEE 

COMMENTS;  Start 3/21/20 at 08:00;  Stop 3/23/20 at 08:25;  Status DC


Sodium Chloride 90 meq/Potassium Chloride 15 meq/ Potassium Phosphate 10 mmol/ 

Magnesium Sulfate 10 meq/Calcium Gluconate 20 meq/ Multivitamins 10 ml/Chromium/

Copper/Manganese/ Seleni/Zn 0.5 ml/ Total Parenteral Nutrition/Amino 

Acids/Dextrose/ Fat Emulsion Intravenous 1,512 ml @  63 mls/hr TPN  CONT IV  

Last administered on 3/21/20at 20:57;  Start 3/21/20 at 22:00;  Stop 3/22/20 at 

21:59;  Status DC


Sodium Chloride 90 meq/Potassium Chloride 15 meq/ Potassium Phosphate 15 mmol/ 

Magnesium Sulfate 10 meq/Calcium Gluconate 20 meq/ Multivitamins 10 ml/Chromium/

Copper/Manganese/ Seleni/Zn 0.5 ml/ Total Parenteral Nutrition/Amino 

Acids/Dextrose/ Fat Emulsion Intravenous 1,512 ml @  63 mls/hr TPN  CONT IV ;  

Start 3/22/20 at 22:00;  Stop 3/22/20 at 14:16;  Status DC


Sodium Chloride 90 meq/Potassium Chloride 15 meq/ Potassium Phosphate 15 mmol/ 

Magnesium Sulfate 10 meq/Calcium Gluconate 20 meq/ Multivitamins 10 ml/Chromium/

Copper/Manganese/ Seleni/Zn 0.5 ml/ Total Parenteral Nutrition/Amino 

Acids/Dextrose/ Fat Emulsion Intravenous 1,200 ml @  50 mls/hr TPN  CONT IV ;  

Start 3/22/20 at 22:00;  Stop 3/22/20 at 14:17;  Status DC


Sodium Chloride 90 meq/Potassium Chloride 15 meq/ Potassium Phosphate 10 mmol/ 

Magnesium Sulfate 10 meq/Calcium Gluconate 20 meq/ Multivitamins 10 ml/Chromium/

Copper/Manganese/ Seleni/Zn 0.5 ml/ Total Parenteral Nutrition/Amino 

Acids/Dextrose/ Fat Emulsion Intravenous 1,200 ml @  50 mls/hr TPN  CONT IV  

Last administered on 3/22/20at 23:29;  Start 3/22/20 at 22:00;  Stop 3/23/20 at 

21:59;  Status DC


Sodium Chloride 1,000 ml @  1,000 mls/hr Q1H PRN IV hypotension;  Start 3/23/20 

at 07:28;  Stop 3/23/20 at 13:27;  Status DC


Albumin Human 200 ml @  200 mls/hr 1X  ONCE IV  Last administered on 3/23/20at 

08:51;  Start 3/23/20 at 07:30;  Stop 3/23/20 at 08:29;  Status DC


Diphenhydramine HCl (Benadryl) 25 mg 1X PRN  PRN IV ITCHING;  Start 3/23/20 at 

07:30;  Stop 3/24/20 at 07:29;  Status DC


Diphenhydramine HCl (Benadryl) 25 mg 1X PRN  PRN IV ITCHING;  Start 3/23/20 at 

07:30;  Stop 3/24/20 at 07:29;  Status DC


Sodium Chloride 1,000 ml @  400 mls/hr Q2H30M PRN IV PATENCY;  Start 3/23/20 at 

07:28;  Stop 3/23/20 at 19:27;  Status DC


Info (PHARMACY MONITORING -- do not chart) 1 each PRN DAILY  PRN MC SEE 

COMMENTS;  Start 3/23/20 at 07:30;  Stop 4/3/20 at 13:01;  Status DC


Metronidazole 100 ml @  100 mls/hr Q6HRS IV  Last administered on 4/8/20at 

06:26;  Start 3/23/20 at 08:30;  Stop 4/8/20 at 09:58;  Status DC


Micafungin Sodium 100 mg/Dextrose 100 ml @  100 mls/hr Q24H IV  Last 

administered on 4/30/20at 08:18;  Start 3/23/20 at 09:00;  Stop 4/30/20 at 

20:58;  Status DC


Propofol 0 ml @ As Directed STK-MED ONCE IV ;  Start 3/23/20 at 07:53;  Stop 

3/23/20 at 07:53;  Status DC


Etomidate (Amidate) 20 mg STK-MED ONCE IV ;  Start 3/23/20 at 07:53;  Stop 3/23

/20 at 07:54;  Status DC


Midazolam HCl (Versed) 5 mg STK-MED ONCE .ROUTE ;  Start 3/23/20 at 07:57;  Stop

3/23/20 at 07:57;  Status DC


Fentanyl Citrate 30 ml @ 0 mls/hr CONT  PRN IV SEE PROTOCOL Last administered on

4/17/20at 06:12;  Start 3/23/20 at 08:15;  Stop 4/17/20 at 09:19;  Status DC


Artificial Tears (Artificial Tears) 1 drop PRN Q1HR  PRN OU DRY EYE, 1st choice;

 Start 3/23/20 at 08:15;  Stop 4/29/20 at 05:31;  Status DC


Midazolam HCl 50 mg/Sodium Chloride 50 ml @ 0 mls/hr CONT  PRN IV SEE PROTOCOL 

Last administered on 3/26/20at 22:39;  Start 3/23/20 at 08:15;  Stop 3/28/20 at 

15:59;  Status DC


Etomidate (Amidate) 8 mg 1X  ONCE IV  Last administered on 3/23/20at 08:33;  

Start 3/23/20 at 08:30;  Stop 3/23/20 at 08:31;  Status DC


Succinylcholine Chloride (Anectine) 120 mg 1X  ONCE IV  Last administered on 

3/23/20at 08:34;  Start 3/23/20 at 08:30;  Stop 3/23/20 at 08:31;  Status DC


Midazolam HCl (Versed) 5 mg 1X  ONCE IV ;  Start 3/23/20 at 08:30;  Stop 3/23/20

at 08:31;  Status DC


Potassium Chloride 15 meq/ Bicarbonate Dialysis Soln w/ out KCl 5,007.5 ml  @ 

1,000 mls/ hr Q5H1M IV  Last administered on 3/24/20at 11:11;  Start 3/23/20 at 

12:00;  Stop 3/24/20 at 11:15;  Status DC


Potassium Chloride 15 meq/ Bicarbonate Dialysis Soln w/ out KCl 5,007.5 ml  @ 

1,000 mls/ hr Q5H1M IV  Last administered on 3/24/20at 11:12;  Start 3/23/20 at 

12:00;  Stop 3/24/20 at 11:17;  Status DC


Potassium Chloride 15 meq/ Bicarbonate Dialysis Soln w/ out KCl 5,007.5 ml  @ 

1,000 mls/ hr Q5H1M IV  Last administered on 3/24/20at 11:11;  Start 3/23/20 at 

12:00;  Stop 3/24/20 at 11:19;  Status DC


Sodium Chloride 90 meq/Potassium Chloride 15 meq/ Potassium Phosphate 10 mmol/ 

Magnesium Sulfate 10 meq/Calcium Gluconate 20 meq/ Multivitamins 10 ml/Chromium/

Copper/Manganese/ Seleni/Zn 0.5 ml/ Total Parenteral Nutrition/Amino 

Acids/Dextrose/ Fat Emulsion Intravenous 1,400 ml @  58.333 mls/ hr TPN  CONT IV

 Last administered on 3/23/20at 21:42;  Start 3/23/20 at 22:00;  Stop 3/24/20 at

21:59;  Status DC


Heparin Sodium (Porcine) (Heparin Sodium) 5,000 unit Q8HRS SQ  Last administered

on 3/28/20at 05:55;  Start 3/23/20 at 15:00;  Stop 3/28/20 at 13:28;  Status DC


Meropenem 500 mg/ Sodium Chloride 50 ml @  100 mls/hr Q6HRS IV  Last 

administered on 3/25/20at 06:00;  Start 3/24/20 at 09:00;  Stop 3/25/20 at 

07:29;  Status DC


Potassium Phosphate 20 mmol/ Sodium Chloride 106.6667 ml @  51.667 m... 1X  ONCE

IV  Last administered on 3/24/20at 11:22;  Start 3/24/20 at 10:15;  Stop 3/24/20

at 12:18;  Status DC


Acetaminophen (Tylenol Supp) 650 mg PRN Q6HRS  PRN VA MILD PAIN / TEMP > 100.3'F

Last administered on 8/10/20at 15:43;  Start 3/24/20 at 10:30


Potassium Chloride/Water 100 ml @  100 mls/hr Q1H IV  Last administered on 

3/24/20at 12:12;  Start 3/24/20 at 11:00;  Stop 3/24/20 at 12:59;  Status DC


Potassium Chloride 20 meq/ Bicarbonate Dialysis Soln w/ out KCl 5,010 ml @  

1,000 mls/hr Q5H1M IV  Last administered on 3/25/20at 08:48;  Start 3/24/20 at 

12:00;  Stop 3/25/20 at 13:03;  Status DC


Potassium Chloride 20 meq/ Bicarbonate Dialysis Soln w/ out KCl 5,010 ml @  

1,000 mls/hr Q5H1M IV  Last administered on 3/29/20at 14:52;  Start 3/24/20 at 

11:30;  Stop 3/29/20 at 19:59;  Status DC


Potassium Chloride 20 meq/ Bicarbonate Dialysis Soln w/ out KCl 5,010 ml @  

1,000 mls/hr Q5H1M IV  Last administered on 3/29/20at 14:53;  Start 3/24/20 at 

11:30;  Stop 3/29/20 at 19:59;  Status DC


Sodium Chloride 90 meq/Potassium Chloride 15 meq/ Potassium Phosphate 15 mmol/ 

Magnesium Sulfate 10 meq/Calcium Gluconate 15 meq/ Multivitamins 10 ml/Chromium/

Copper/Manganese/ Seleni/Zn 0.5 ml/ Total Parenteral Nutrition/Amino 

Acids/Dextrose/ Fat Emulsion Intravenous 1,400 ml @  58.333 mls/ hr TPN  CONT IV

 Last administered on 3/24/20at 22:17;  Start 3/24/20 at 22:00;  Stop 3/25/20 at

21:59;  Status DC


Cefepime HCl (Maxipime) 2 gm Q12HR IVP  Last administered on 4/7/20at 20:56;  

Start 3/25/20 at 09:00;  Stop 4/8/20 at 09:58;  Status DC


Daptomycin 500 mg/ Sodium Chloride 50 ml @  100 mls/hr Q48H IV  Last 

administered on 4/10/20at 09:57;  Start 3/25/20 at 08:30;  Stop 4/10/20 at 10:

07;  Status DC


Lidocaine HCl (Buffered Lidocaine 1%) 3 ml 1X  ONCE INJ  Last administered on 

3/25/20at 10:27;  Start 3/25/20 at 10:30;  Stop 3/25/20 at 10:31;  Status DC


Potassium Phosphate 20 mmol/ Sodium Chloride 106.6667 ml @  51.667 m... 1X  ONCE

IV  Last administered on 3/25/20at 12:51;  Start 3/25/20 at 13:00;  Stop 3/25/20

at 15:03;  Status DC


Sodium Chloride 90 meq/Potassium Chloride 15 meq/ Potassium Phosphate 18 mmol/ 

Magnesium Sulfate 8 meq/Calcium Gluconate 15 meq/ Multivitamins 10 ml/Chromium/ 

Copper/Manganese/ Seleni/Zn 0.5 ml/ Total Parenteral Nutrition/Amino 

Acids/Dextrose/ Fat Emulsion Intravenous 1,400 ml @  58.333 mls/ hr TPN  CONT IV

 Last administered on 3/25/20at 22:16;  Start 3/25/20 at 22:00;  Stop 3/26/20 at

21:59;  Status DC


Potassium Chloride 20 meq/ Bicarbonate Dialysis Soln w/ out KCl 5,010 ml @  

1,000 mls/hr Q5H1M IV  Last administered on 3/29/20at 14:54;  Start 3/25/20 at 

16:00;  Stop 3/29/20 at 19:59;  Status DC


Multi-Ingred Cream/Lotion/Oil/ Oint (Artificial Tears Eye Ointment) 1 thomas PRN 

Q1HR  PRN OU DRY EYE, 2nd choice Last administered on 4/13/20at 08:19;  Start 

3/25/20 at 17:30;  Stop 6/3/20 at 14:39;  Status DC


Sodium Chloride 90 meq/Potassium Chloride 15 meq/ Potassium Phosphate 18 mmol/ 

Magnesium Sulfate 8 meq/Calcium Gluconate 15 meq/ Multivitamins 10 ml/Chromium/ 

Copper/Manganese/ Seleni/Zn 0.5 ml/ Total Parenteral Nutrition/Amino 

Acids/Dextrose/ Fat Emulsion Intravenous 1,400 ml @  58.333 mls/ hr TPN  CONT IV

 Last administered on 3/26/20at 22:00;  Start 3/26/20 at 22:00;  Stop 3/27/20 at

21:59;  Status DC


Albumin Human 500 ml @  125 mls/hr 1X  ONCE IV ;  Start 3/26/20 at 14:15;  Stop 

3/26/20 at 18:14;  Status DC


Sodium Chloride 90 meq/Potassium Chloride 15 meq/ Potassium Phosphate 18 mmol/ 

Magnesium Sulfate 8 meq/Calcium Gluconate 15 meq/ Multivitamins 10 ml/Chromium/ 

Copper/Manganese/ Seleni/Zn 0.5 ml/ Insulin Human Regular 10 unit/ Total Paren

teral Nutrition/Amino Acids/Dextrose/ Fat Emulsion Intravenous 1,400 ml @  

58.333 mls/ hr TPN  CONT IV  Last administered on 3/27/20at 21:43;  Start 

3/27/20 at 22:00;  Stop 3/28/20 at 21:59;  Status DC


Lidocaine HCl (Buffered Lidocaine 1%) 3 ml STK-MED ONCE .ROUTE ;  Start 3/25/20 

at 10:00;  Stop 3/27/20 at 13:57;  Status DC


Midazolam HCl 100 mg/Sodium Chloride 100 ml @ 7 mls/hr CONT  PRN IV SEE PROTOCOL

Last administered on 4/8/20at 15:35;  Start 3/28/20 at 16:00;  Stop 6/3/20 at 

14:38;  Status DC


Sodium Chloride 90 meq/Potassium Chloride 15 meq/ Potassium Phosphate 18 mmol/ 

Magnesium Sulfate 8 meq/Calcium Gluconate 15 meq/ Multivitamins 10 ml/Chromium/ 

Copper/Manganese/ Seleni/Zn 0.5 ml/ Insulin Human Regular 15 unit/ Total 

Parenteral Nutrition/Amino Acids/Dextrose/ Fat Emulsion Intravenous 1,400 ml @  

58.333 mls/ hr TPN  CONT IV  Last administered on 3/28/20at 20:34;  Start 3/28/2

0 at 22:00;  Stop 3/29/20 at 21:59;  Status DC


Info ([facility] Electrolyte Protocol) 1 ea CONT PRN  PRN MC PER PROTOCOL;  Start 

3/29/20 at 13:15


Sodium Chloride 90 meq/Potassium Chloride 15 meq/ Potassium Phosphate 18 mmol/ 

Magnesium Sulfate 8 meq/Calcium Gluconate 15 meq/ Multivitamins 10 ml/Chromium/ 

Copper/Manganese/ Seleni/Zn 0.5 ml/ Insulin Human Regular 15 unit/ Total 

Parenteral Nutrition/Amino Acids/Dextrose/ Fat Emulsion Intravenous 1,400 ml @  

58.333 mls/ hr TPN  CONT IV  Last administered on 3/29/20at 22:05;  Start 

3/29/20 at 22:00;  Stop 3/30/20 at 21:59;  Status DC


Potassium Chloride 15 meq/ Bicarbonate Dialysis Soln w/ out KCl 5,007.5 ml  @ 

1,000 mls/ hr Q5H1M IV  Last administered on 4/1/20at 18:14;  Start 3/29/20 at 

20:00;  Stop 4/2/20 at 13:08;  Status DC


Potassium Chloride 15 meq/ Bicarbonate Dialysis Soln w/ out KCl 5,007.5 ml  @ 

1,000 mls/ hr Q5H1M IV  Last administered on 4/1/20at 18:14;  Start 3/29/20 at 

20:00;  Stop 4/2/20 at 13:08;  Status DC


Potassium Chloride 15 meq/ Bicarbonate Dialysis Soln w/ out KCl 5,007.5 ml  @ 

1,000 mls/ hr Q5H1M IV  Last administered on 4/1/20at 18:14;  Start 3/29/20 at 

20:00;  Stop 4/2/20 at 13:08;  Status DC


Iohexol (Omnipaque 240 Mg/ml) 30 ml 1X  ONCE PO  Last administered on 3/30/20at 

11:30;  Start 3/30/20 at 11:30;  Stop 3/30/20 at 11:33;  Status DC


Info (CONTRAST GIVEN -- Rx MONITORING) 1 each PRN DAILY  PRN MC SEE COMMENTS;  

Start 3/30/20 at 11:45;  Stop 4/1/20 at 11:44;  Status DC


Sodium Chloride 90 meq/Potassium Chloride 15 meq/ Potassium Phosphate 18 mmol/ M

agnesium Sulfate 8 meq/Calcium Gluconate 15 meq/ Multivitamins 10 ml/Chromium/ 

Copper/Manganese/ Seleni/Zn 0.5 ml/ Insulin Human Regular 15 unit/ Total 

Parenteral Nutrition/Amino Acids/Dextrose/ Fat Emulsion Intravenous 1,400 ml @  

58.333 mls/ hr TPN  CONT IV  Last administered on 3/30/20at 21:47;  Start 

3/30/20 at 22:00;  Stop 3/31/20 at 21:59;  Status DC


Sodium Chloride 90 meq/Potassium Chloride 15 meq/ Potassium Phosphate 18 mmol/ 

Magnesium Sulfate 8 meq/Calcium Gluconate 15 meq/ Multivitamins 10 ml/Chromium/ 

Copper/Manganese/ Seleni/Zn 0.5 ml/ Insulin Human Regular 20 unit/ Total 

Parenteral Nutrition/Amino Acids/Dextrose/ Fat Emulsion Intravenous 1,400 ml @  

58.333 mls/ hr TPN  CONT IV  Last administered on 3/31/20at 21:36;  Start 

3/31/20 at 22:00;  Stop 4/1/20 at 21:59;  Status DC


Alteplase, Recombinant (Cathflo For Central Catheter Clearance) 1 mg 1X  ONCE 

INT CAT  Last administered on 3/31/20at 20:03;  Start 3/31/20 at 19:30;  Stop 

3/31/20 at 19:46;  Status DC


Alteplase, Recombinant (Cathflo For Central Catheter Clearance) 1 mg 1X  ONCE 

INT CAT  Last administered on 3/31/20at 22:05;  Start 3/31/20 at 22:00;  Stop 

3/31/20 at 22:01;  Status DC


Sodium Chloride 90 meq/Potassium Chloride 15 meq/ Potassium Phosphate 18 mmol/ 

Magnesium Sulfate 8 meq/Calcium Gluconate 15 meq/ Multivitamins 10 ml/Chromium/ 

Copper/Manganese/ Seleni/Zn 0.5 ml/ Insulin Human Regular 20 unit/ Total 

Parenteral Nutrition/Amino Acids/Dextrose/ Fat Emulsion Intravenous 1,400 ml @  

58.333 mls/ hr TPN  CONT IV  Last administered on 4/1/20at 21:30;  Start 4/1/20 

at 22:00;  Stop 4/2/20 at 21:59;  Status DC


Dexmedetomidine HCl 400 mcg/ Sodium Chloride 100 ml @ 0 mls/hr CONT  PRN IV 

ANXIETY / AGITATION Last administered on 5/30/20at 12:57;  Start 4/2/20 at 

08:15;  Stop 5/30/20 at 18:31;  Status DC


Sodium Chloride 500 ml @  500 mls/hr 1X PRN  PRN IV ELEVATED BP, SEE COMMENTS;  

Start 4/2/20 at 08:15;  Stop 8/5/20 at 09:08;  Status DC


Atropine Sulfate (ATROPINE 0.5mg SYRINGE) 0.5 mg PRN Q5MIN  PRN IV SEE COMMENTS;

 Start 4/2/20 at 08:15;  Stop 8/3/20 at 09:45;  Status DC


Furosemide (Lasix) 20 mg 1X  ONCE IVP  Last administered on 4/2/20at 08:19;  

Start 4/2/20 at 08:15;  Stop 4/2/20 at 08:16;  Status DC


Lidocaine HCl (Buffered Lidocaine 1%) 3 ml STK-MED ONCE .ROUTE ;  Start 4/2/20 

at 08:39;  Stop 4/2/20 at 08:39;  Status DC


Lidocaine HCl (Buffered Lidocaine 1%) 6 ml 1X  ONCE INJ  Last administered on 

4/2/20at 09:05;  Start 4/2/20 at 09:00;  Stop 4/2/20 at 09:06;  Status DC


Sodium Chloride 90 meq/Potassium Chloride 15 meq/ Potassium Phosphate 18 mmol/ 

Magnesium Sulfate 8 meq/Calcium Gluconate 15 meq/ Multivitamins 10 ml/Chromium/ 

Copper/Manganese/ Seleni/Zn 0.5 ml/ Insulin Human Regular 20 unit/ Total 

Parenteral Nutrition/Amino Acids/Dextrose/ Fat Emulsion Intravenous 1,400 ml @  

58.333 mls/ hr TPN  CONT IV  Last administered on 4/2/20at 22:45;  Start 4/2/20 

at 22:00;  Stop 4/3/20 at 21:59;  Status DC


Sodium Chloride 1,000 ml @  1,000 mls/hr Q1H PRN IV hypotension;  Start 4/3/20 

at 07:30;  Stop 4/3/20 at 13:29;  Status DC


Albumin Human 200 ml @  200 mls/hr 1X PRN  PRN IV Hypotension Last administered 

on 4/3/20at 09:36;  Start 4/3/20 at 07:30;  Stop 4/3/20 at 13:29;  Status DC


Sodium Chloride (Normal Saline Flush) 10 ml 1X PRN  PRN IV AP catheter pack;  

Start 4/3/20 at 07:30;  Stop 4/3/20 at 21:29;  Status DC


Sodium Chloride (Normal Saline Flush) 10 ml 1X PRN  PRN IV  catheter pack;  

Start 4/3/20 at 07:30;  Stop 4/4/20 at 07:29;  Status DC


Sodium Chloride 1,000 ml @  400 mls/hr Q2H30M PRN IV PATENCY;  Start 4/3/20 at 

07:30;  Stop 4/3/20 at 19:29;  Status DC


Info (PHARMACY MONITORING -- do not chart) 1 each PRN DAILY  PRN MC SEE 

COMMENTS;  Start 4/3/20 at 07:30;  Stop 4/3/20 at 13:02;  Status DC


Info (PHARMACY MONITORING -- do not chart) 1 each PRN DAILY  PRN MC SEE 

COMMENTS;  Start 4/3/20 at 07:30;  Stop 4/5/20 at 12:45;  Status DC


Sodium Chloride 90 meq/Potassium Chloride 15 meq/ Potassium Phosphate 10 mmol/ 

Magnesium Sulfate 8 meq/Calcium Gluconate 15 meq/ Multivitamins 10 ml/Chromium/ 

Copper/Manganese/ Seleni/Zn 0.5 ml/ Insulin Human Regular 25 unit/ Total 

Parenteral Nutrition/Amino Acids/Dextrose/ Fat Emulsion Intravenous 1,400 ml @  

58.333 mls/ hr TPN  CONT IV  Last administered on 4/3/20at 22:19;  Start 4/3/20 

at 22:00;  Stop 4/4/20 at 21:59;  Status DC


Heparin Sodium (Porcine) (Heparin Sodium) 5,000 unit Q12HR SQ  Last administered

on 4/26/20at 08:59;  Start 4/3/20 at 21:00;  Stop 4/26/20 at 10:05;  Status DC


Ondansetron HCl (Zofran) 4 mg PRN Q6HRS  PRN IV NAUSEA/VOMITING;  Start 4/6/20 

at 07:00;  Stop 4/7/20 at 06:59;  Status DC


Fentanyl Citrate (Fentanyl 2ml Vial) 25 mcg PRN Q5MIN  PRN IV MILD PAIN 1-3;  

Start 4/6/20 at 07:00;  Stop 4/7/20 at 06:59;  Status DC


Fentanyl Citrate (Fentanyl 2ml Vial) 50 mcg PRN Q5MIN  PRN IV MODERATE TO SEVERE

PAIN;  Start 4/6/20 at 07:00;  Stop 4/7/20 at 06:59;  Status DC


Ringer's Solution 1,000 ml @  30 mls/hr Q24H IV ;  Start 4/6/20 at 07:00;  Stop 

4/6/20 at 18:59;  Status DC


Lidocaine HCl (Xylocaine-Mpf 1% 2ml Vial) 2 ml PRN 1X  PRN ID PRIOR TO IV START;

 Start 4/6/20 at 07:00;  Stop 4/7/20 at 06:59;  Status DC


Prochlorperazine Edisylate (Compazine) 5 mg PACU PRN  PRN IV NAUSEA, MRX1;  

Start 4/6/20 at 07:00;  Stop 4/7/20 at 06:59;  Status DC


Sodium Chloride 1,000 ml @  1,000 mls/hr Q1H PRN IV hypotension;  Start 4/4/20 

at 09:10;  Stop 4/4/20 at 15:09;  Status DC


Albumin Human 200 ml @  200 mls/hr 1X PRN  PRN IV Hypotension Last administered 

on 4/4/20at 10:10;  Start 4/4/20 at 09:15;  Stop 4/4/20 at 15:14;  Status DC


Sodium Chloride 1,000 ml @  400 mls/hr Q2H30M PRN IV PATENCY;  Start 4/4/20 at 

09:10;  Stop 4/4/20 at 21:09;  Status DC


Info (PHARMACY MONITORING -- do not chart) 1 each PRN DAILY  PRN MC SEE 

COMMENTS;  Start 4/4/20 at 09:15;  Stop 4/5/20 at 12:45;  Status DC


Info (PHARMACY MONITORING -- do not chart) 1 each PRN DAILY  PRN MC SEE 

COMMENTS;  Start 4/4/20 at 09:15;  Stop 4/5/20 at 12:45;  Status DC


Sodium Chloride 90 meq/Potassium Chloride 15 meq/ Potassium Phosphate 10 mmol/ 

Magnesium Sulfate 8 meq/Calcium Gluconate 15 meq/ Multivitamins 10 ml/Chromium/ 

Copper/Manganese/ Seleni/Zn 0.5 ml/ Insulin Human Regular 25 unit/ Total 

Parenteral Nutrition/Amino Acids/Dextrose/ Fat Emulsion Intravenous 1,400 ml @  

58.333 mls/ hr TPN  CONT IV  Last administered on 4/4/20at 22:10;  Start 4/4/20 

at 22:00;  Stop 4/5/20 at 21:59;  Status DC


Magnesium Sulfate 50 ml @ 25 mls/hr PRN DAILY  PRN IV for Mag < 1.7 on am labs 

Last administered on 6/18/20at 10:57;  Start 4/5/20 at 09:15


Sodium Chloride 90 meq/Potassium Chloride 15 meq/ Potassium Phosphate 10 mmol/ 

Magnesium Sulfate 8 meq/Calcium Gluconate 15 meq/ Multivitamins 10 ml/Chromium/ 

Copper/Manganese/ Seleni/Zn 0.5 ml/ Insulin Human Regular 25 unit/ Total 

Parenteral Nutrition/Amino Acids/Dextrose/ Fat Emulsion Intravenous 1,400 ml @  

58.333 mls/ hr TPN  CONT IV  Last administered on 4/5/20at 21:20;  Start 4/5/20 

at 22:00;  Stop 4/6/20 at 21:59;  Status DC


Sodium Chloride 1,000 ml @  1,000 mls/hr Q1H PRN IV hypotension;  Start 4/5/20 

at 12:23;  Stop 4/5/20 at 18:22;  Status DC


Albumin Human 200 ml @  200 mls/hr 1X  ONCE IV  Last administered on 4/5/20at 

13:34;  Start 4/5/20 at 12:30;  Stop 4/5/20 at 13:29;  Status DC


Diphenhydramine HCl (Benadryl) 25 mg 1X PRN  PRN IV ITCHING;  Start 4/5/20 at 

12:30;  Stop 4/6/20 at 12:29;  Status DC


Diphenhydramine HCl (Benadryl) 25 mg 1X PRN  PRN IV ITCHING;  Start 4/5/20 at 

12:30;  Stop 4/6/20 at 12:29;  Status DC


Info (PHARMACY MONITORING -- do not chart) 1 each PRN DAILY  PRN MC SEE 

COMMENTS;  Start 4/5/20 at 12:30;  Status Cancel


Bupivacaine HCl/ Epinephrine Bitart (Sensorcain-Epi 0.5%-1:[id number] Mpf) 30 ml 

STK-MED ONCE .ROUTE  Last administered on 4/6/20at 11:44;  Start 4/6/20 at 

11:00;  Stop 4/6/20 at 11:01;  Status DC


Cellulose (Surgicel Fibrillar 1x2) 1 each STK-MED ONCE .ROUTE ;  Start 4/6/20 at

11:00;  Stop 4/6/20 at 11:01;  Status DC


Sodium Chloride 90 meq/Potassium Chloride 15 meq/ Potassium Phosphate 10 mmol/ 

Magnesium Sulfate 12 meq/Calcium Gluconate 15 meq/ Multivitamins 10 ml/Chromium/

Copper/Manganese/ Seleni/Zn 0.5 ml/ Insulin Human Regular 25 unit/ Total 

Parenteral Nutrition/Amino Acids/Dextrose/ Fat Emulsion Intravenous 1,400 ml @  

58.333 mls/ hr TPN  CONT IV  Last administered on 4/6/20at 22:24;  Start 4/6/20 

at 22:00;  Stop 4/7/20 at 21:59;  Status DC


Propofol 20 ml @ As Directed STK-MED ONCE IV ;  Start 4/6/20 at 11:07;  Stop 

4/6/20 at 11:07;  Status DC


Cellulose (Surgicel Hemostat 4x8) 1 each STK-MED ONCE .ROUTE  Last administered 

on 4/6/20at 11:44;  Start 4/6/20 at 11:55;  Stop 4/6/20 at 11:56;  Status DC


Sevoflurane (Ultane) 60 ml STK-MED ONCE IH ;  Start 4/6/20 at 12:46;  Stop 

4/6/20 at 12:46;  Status DC


Sodium Chloride 1,000 ml @  1,000 mls/hr Q1H PRN IV hypotension;  Start 4/6/20 

at 13:51;  Stop 4/6/20 at 19:50;  Status DC


Albumin Human 200 ml @  200 mls/hr 1X PRN  PRN IV Hypotension Last administered 

on 4/6/20at 14:51;  Start 4/6/20 at 14:00;  Stop 4/6/20 at 19:59;  Status DC


Diphenhydramine HCl (Benadryl) 25 mg 1X PRN  PRN IV ITCHING;  Start 4/6/20 at 

14:00;  Stop 4/7/20 at 13:59;  Status DC


Diphenhydramine HCl (Benadryl) 25 mg 1X PRN  PRN IV ITCHING;  Start 4/6/20 at 

14:00;  Stop 4/7/20 at 13:59;  Status DC


Sodium Chloride 1,000 ml @  400 mls/hr Q2H30M PRN IV PATENCY;  Start 4/6/20 at 

13:51;  Stop 4/7/20 at 01:50;  Status DC


Info (PHARMACY MONITORING -- do not chart) 1 each PRN DAILY  PRN MC SEE 

COMMENTS;  Start 4/6/20 at 14:00;  Stop 4/9/20 at 08:16;  Status DC


Heparin Sodium (Porcine) (Hep Lock Adult) 500 unit STK-MED ONCE IVP ;  Start 

4/7/20 at 09:29;  Stop 4/7/20 at 09:30;  Status DC


Sodium Chloride 1,000 ml @  1,000 mls/hr Q1H PRN IV hypotension;  Start 4/7/20 

at 10:43;  Stop 4/7/20 at 16:42;  Status DC


Sodium Chloride 1,000 ml @  400 mls/hr Q2H30M PRN IV PATENCY;  Start 4/7/20 at 

10:43;  Stop 4/7/20 at 22:42;  Status DC


Info (PHARMACY MONITORING -- do not chart) 1 each PRN DAILY  PRN MC SEE 

COMMENTS;  Start 4/7/20 at 10:45;  Status UNV


Info (PHARMACY MONITORING -- do not chart) 1 each PRN DAILY  PRN MC SEE 

COMMENTS;  Start 4/7/20 at 10:45;  Status UNV


Sodium Chloride 90 meq/Potassium Chloride 15 meq/ Magnesium Sulfate 12 

meq/Calcium Gluconate 15 meq/ Multivitamins 10 ml/Chromium/ Copper/Manganese/ 

Seleni/Zn 0.5 ml/ Insulin Human Regular 25 unit/ Total Parenteral 

Nutrition/Amino Acids/Dextrose/ Fat Emulsion Intravenous 1,400 ml @  58.333 mls/

hr TPN  CONT IV  Last administered on 4/7/20at 22:13;  Start 4/7/20 at 22:00;  

Stop 4/8/20 at 21:59;  Status DC


Sodium Chloride 1,000 ml @  1,000 mls/hr Q1H PRN IV hypotension;  Start 4/8/20 

at 07:50;  Stop 4/8/20 at 13:49;  Status DC


Albumin Human 200 ml @  200 mls/hr 1X  ONCE IV ;  Start 4/8/20 at 08:00;  Stop 

4/8/20 at 08:53;  Status DC


Diphenhydramine HCl (Benadryl) 25 mg 1X PRN  PRN IV ITCHING;  Start 4/8/20 at 

08:00;  Stop 4/9/20 at 07:59;  Status DC


Diphenhydramine HCl (Benadryl) 25 mg 1X PRN  PRN IV ITCHING;  Start 4/8/20 at 

08:00;  Stop 4/9/20 at 07:59;  Status DC


Info (PHARMACY MONITORING -- do not chart) 1 each PRN DAILY  PRN MC SEE 

COMMENTS;  Start 4/8/20 at 08:00;  Stop 4/9/20 at 08:16;  Status DC


Albumin Human 50 ml @ 50 mls/hr 1X  ONCE IV ;  Start 4/8/20 at 08:53;  Stop 

4/8/20 at 08:56;  Status DC


Albumin Human 200 ml @  50 mls/hr PRN 1X  PRN IV HYPOTENSION Last administered 

on 4/14/20at 11:54;  Start 4/8/20 at 09:00;  Stop 5/21/20 at 11:14;  Status DC


Meropenem 500 mg/ Sodium Chloride 50 ml @  100 mls/hr Q12H IV  Last administered

on 4/28/20at 10:45;  Start 4/8/20 at 10:00;  Stop 4/28/20 at 12:37;  Status DC


Sodium Chloride 90 meq/Magnesium Sulfate 12 meq/ Calcium Gluconate 15 meq/ 

Multivitamins 10 ml/Chromium/ Copper/Manganese/ Seleni/Zn 0.5 ml/ Insulin Human 

Regular 25 unit/ Total Parenteral Nutrition/Amino Acids/Dextrose/ Fat Emulsion 

Intravenous 1,400 ml @  58.333 mls/ hr TPN  CONT IV  Last administered on 

4/8/20at 21:41;  Start 4/8/20 at 22:00;  Stop 4/9/20 at 21:59;  Status DC


Sodium Chloride 1,000 ml @  1,000 mls/hr Q1H PRN IV hypotension;  Start 4/9/20 

at 07:58;  Stop 4/9/20 at 13:57;  Status DC


Albumin Human 200 ml @  200 mls/hr 1X PRN  PRN IV Hypotension Last administered 

on 4/9/20at 09:30;  Start 4/9/20 at 08:00;  Stop 4/9/20 at 13:59;  Status DC


Sodium Chloride 1,000 ml @  400 mls/hr Q2H30M PRN IV PATENCY;  Start 4/9/20 at 

07:58;  Stop 4/9/20 at 19:57;  Status DC


Info (PHARMACY MONITORING -- do not chart) 1 each PRN DAILY  PRN MC SEE 

COMMENTS;  Start 4/9/20 at 08:00;  Status Cancel


Info (PHARMACY MONITORING -- do not chart) 1 each PRN DAILY  PRN MC SEE 

COMMENTS;  Start 4/9/20 at 08:15;  Status UNV


Sodium Chloride 90 meq/Potassium Phosphate 5 mmol/ Magnesium Sulfate 12 

meq/Calcium Gluconate 15 meq/ Multivitamins 10 ml/Chromium/ Copper/Manganese/ 

Seleni/Zn 0.5 ml/ Insulin Human Regular 30 unit/ Total Parenteral 

Nutrition/Amino Acids/Dextrose/ Fat Emulsion Intravenous 1,400 ml @  58.333 mls/

hr TPN  CONT IV  Last administered on 4/9/20at 22:08;  Start 4/9/20 at 22:00;  

Stop 4/10/20 at 21:59;  Status DC


Linezolid/Dextrose 300 ml @  300 mls/hr Q12HR IV  Last administered on 4/20/20at

20:40;  Start 4/10/20 at 11:00;  Stop 4/21/20 at 08:10;  Status DC


Sodium Chloride 90 meq/Potassium Phosphate 15 mmol/ Magnesium Sulfate 12 

meq/Calcium Gluconate 15 meq/ Multivitamins 10 ml/Chromium/ Copper/Manganese/ 

Seleni/Zn 0.5 ml/ Insulin Human Regular 30 unit/ Total Parenteral Nutrition/Ami

no Acids/Dextrose/ Fat Emulsion Intravenous 1,400 ml @  58.333 mls/ hr TPN  CONT

IV  Last administered on 4/10/20at 21:49;  Start 4/10/20 at 22:00;  Stop 4/11/20

at 21:59;  Status DC


Sodium Chloride 90 meq/Potassium Phosphate 15 mmol/ Magnesium Sulfate 12 

meq/Calcium Gluconate 15 meq/ Multivitamins 10 ml/Chromium/ Copper/Manganese/ 

Seleni/Zn 0.5 ml/ Insulin Human Regular 40 unit/ Total Parenteral 

Nutrition/Amino Acids/Dextrose/ Fat Emulsion Intravenous 1,400 ml @  58.333 mls/

hr TPN  CONT IV  Last administered on 4/11/20at 21:21;  Start 4/11/20 at 22:00; 

Stop 4/12/20 at 21:59;  Status DC


Sodium Chloride 1,000 ml @  1,000 mls/hr Q1H PRN IV hypotension;  Start 4/11/20 

at 13:26;  Stop 4/11/20 at 19:25;  Status DC


Albumin Human 200 ml @  200 mls/hr 1X PRN  PRN IV Hypotension Last administered 

on 4/11/20at 15:00;  Start 4/11/20 at 13:30;  Stop 4/11/20 at 19:29;  Status DC


Sodium Chloride (Normal Saline Flush) 10 ml 1X PRN  PRN IV AP catheter pack;  

Start 4/11/20 at 13:30;  Stop 4/12/20 at 13:29;  Status DC


Sodium Chloride (Normal Saline Flush) 10 ml 1X PRN  PRN IV  catheter pack;  

Start 4/11/20 at 13:30;  Stop 4/12/20 at 13:29;  Status DC


Sodium Chloride 1,000 ml @  400 mls/hr Q2H30M PRN IV PATENCY;  Start 4/11/20 at 

13:26;  Stop 4/12/20 at 01:25;  Status DC


Info (PHARMACY MONITORING -- do not chart) 1 each PRN DAILY  PRN MC SEE 

COMMENTS;  Start 4/11/20 at 13:30;  Stop 4/11/20 at 13:33;  Status DC


Info (PHARMACY MONITORING -- do not chart) 1 each PRN DAILY  PRN MC SEE 

COMMENTS;  Start 4/11/20 at 13:30;  Stop 4/11/20 at 13:34;  Status DC


Sodium Chloride 90 meq/Potassium Phosphate 19 mmol/ Magnesium Sulfate 12 

meq/Calcium Gluconate 15 meq/ Multivitamins 10 ml/Chromium/ Copper/Manganese/ 

Seleni/Zn 0.5 ml/ Insulin Human Regular 40 unit/ Total Parenteral Nutrition/Amin

o Acids/Dextrose/ Fat Emulsion Intravenous 1,400 ml @  58.333 mls/ hr TPN  CONT 

IV  Last administered on 4/12/20at 21:54;  Start 4/12/20 at 22:00;  Stop 4/13/20

at 21:59;  Status DC


Sodium Chloride 1,000 ml @  1,000 mls/hr Q1H PRN IV hypotension;  Start 4/13/20 

at 09:35;  Stop 4/13/20 at 15:34;  Status DC


Albumin Human 200 ml @  200 mls/hr 1X PRN  PRN IV Hypotension;  Start 4/13/20 at

09:45;  Stop 4/13/20 at 15:44;  Status DC


Diphenhydramine HCl (Benadryl) 25 mg 1X PRN  PRN IV ITCHING;  Start 4/13/20 at 

09:45;  Stop 4/14/20 at 09:44;  Status DC


Diphenhydramine HCl (Benadryl) 25 mg 1X PRN  PRN IV ITCHING;  Start 4/13/20 at 

09:45;  Stop 4/14/20 at 09:44;  Status DC


Sodium Chloride 1,000 ml @  400 mls/hr Q2H30M PRN IV PATENCY;  Start 4/13/20 at 

09:35;  Stop 4/13/20 at 21:34;  Status DC


Info (PHARMACY MONITORING -- do not chart) 1 each PRN DAILY  PRN MC SEE 

COMMENTS;  Start 4/13/20 at 09:45;  Status Cancel


Sodium Chloride 100 meq/Potassium Phosphate 19 mmol/ Magnesium Sulfate 12 

meq/Calcium Gluconate 15 meq/ Multivitamins 10 ml/Chromium/ Copper/Manganese/ 

Seleni/Zn 0.5 ml/ Insulin Human Regular 40 unit/ Potassium Chloride 20 meq/ 

Total Parenteral Nutrition/Amino Acids/Dextrose/ Fat Emulsion Intravenous 1,400 

ml @  58.333 mls/ hr TPN  CONT IV  Last administered on 4/13/20at 22:02;  Start 

4/13/20 at 22:00;  Stop 4/14/20 at 21:59;  Status DC


Furosemide (Lasix) 40 mg 1X  ONCE IVP  Last administered on 4/13/20at 14:39;  

Start 4/13/20 at 14:30;  Stop 4/13/20 at 14:31;  Status DC


Metronidazole 100 ml @  100 mls/hr Q8HRS IV  Last administered on 4/21/20at 

06:04;  Start 4/14/20 at 10:00;  Stop 4/21/20 at 08:10;  Status DC


Sodium Chloride 1,000 ml @  1,000 mls/hr Q1H PRN IV hypotension;  Start 4/14/20 

at 08:00;  Stop 4/14/20 at 13:59;  Status DC


Albumin Human 200 ml @  200 mls/hr 1X PRN  PRN IV Hypotension;  Start 4/14/20 at

08:00;  Stop 4/14/20 at 13:59;  Status DC


Sodium Chloride 1,000 ml @  400 mls/hr Q2H30M PRN IV PATENCY;  Start 4/14/20 at 

08:00;  Stop 4/14/20 at 19:59;  Status DC


Info (PHARMACY MONITORING -- do not chart) 1 each PRN DAILY  PRN MC SEE 

COMMENTS;  Start 4/14/20 at 11:30;  Status UNV


Info (PHARMACY MONITORING -- do not chart) 1 each PRN DAILY  PRN MC SEE 

COMMENTS;  Start 4/14/20 at 11:30;  Stop 4/16/20 at 12:13;  Status DC


Sodium Chloride 100 meq/Potassium Phosphate 19 mmol/ Magnesium Sulfate 12 

meq/Calcium Gluconate 15 meq/ Multivitamins 10 ml/Chromium/ Copper/Manganese/ 

Seleni/Zn 0.5 ml/ Insulin Human Regular 40 unit/ Potassium Chloride 20 meq/ 

Total Parenteral Nutrition/Amino Acids/Dextrose/ Fat Emulsion Intravenous 1,400 

ml @  58.333 mls/ hr TPN  CONT IV  Last administered on 4/14/20at 21:52;  Start 

4/14/20 at 22:00;  Stop 4/15/20 at 21:59;  Status DC


Sodium Chloride (Normal Saline Flush) 10 ml QSHIFT  PRN IV AFTER MEDS AND BLOOD 

DRAWS;  Start 4/14/20 at 15:00;  Stop 5/12/20 at 11:27;  Status DC


Sodium Chloride (Normal Saline Flush) 10 ml PRN Q5MIN  PRN IV AFTER MEDS AND 

BLOOD DRAWS;  Start 4/14/20 at 15:00;  Stop 8/1/20 at 10:12;  Status DC


Sodium Chloride (Normal Saline Flush) 20 ml PRN Q5MIN  PRN IV AFTER MEDS AND 

BLOOD DRAWS;  Start 4/14/20 at 15:00


Sodium Chloride 100 meq/Potassium Phosphate 19 mmol/ Magnesium Sulfate 12 

meq/Calcium Gluconate 15 meq/ Multivitamins 10 ml/Chromium/ Copper/Manganese/ 

Seleni/Zn 0.5 ml/ Insulin Human Regular 40 unit/ Potassium Chloride 20 meq/ 

Total Parenteral Nutrition/Amino Acids/Dextrose/ Fat Emulsion Intravenous 1,400 

ml @  58.333 mls/ hr TPN  CONT IV  Last administered on 4/15/20at 21:20;  Start 

4/15/20 at 22:00;  Stop 4/16/20 at 21:59;  Status DC


Lidocaine HCl (Buffered Lidocaine 1%) 3 ml STK-MED ONCE .ROUTE ;  Start 4/15/20 

at 13:16;  Stop 4/15/20 at 13:16;  Status DC


Lidocaine HCl (Buffered Lidocaine 1%) 6 ml 1X  ONCE INJ  Last administered on 

4/15/20at 13:45;  Start 4/15/20 at 13:30;  Stop 4/15/20 at 13:31;  Status DC


Albumin Human 100 ml @  100 mls/hr 1X  ONCE IV  Last administered on 4/15/20at 

15:41;  Start 4/15/20 at 15:00;  Stop 4/15/20 at 15:59;  Status DC


Albumin Human 50 ml @ 50 mls/hr 1X  ONCE IV  Last administered on 4/15/20at 

15:00;  Start 4/15/20 at 15:00;  Stop 4/15/20 at 15:59;  Status DC


Info (PHARMACY MONITORING -- do not chart) 1 each PRN DAILY  PRN MC SEE 

COMMENTS;  Start 4/16/20 at 11:30;  Status Cancel


Info (PHARMACY MONITORING -- do not chart) 1 each PRN DAILY  PRN MC SEE 

COMMENTS;  Start 4/16/20 at 11:30;  Status UNV


Sodium Chloride 100 meq/Potassium Phosphate 10 mmol/ Magnesium Sulfate 12 

meq/Calcium Gluconate 15 meq/ Multivitamins 10 ml/Chromium/ Copper/Manganese/ 

Seleni/Zn 0.5 ml/ Insulin Human Regular 35 unit/ Potassium Chloride 20 meq/ 

Total Parenteral Nutrition/Amino Acids/Dextrose/ Fat Emulsion Intravenous 1,400 

ml @  58.333 mls/ hr TPN  CONT IV  Last administered on 4/16/20at 22:10;  Start 

4/16/20 at 22:00;  Stop 4/17/20 at 21:59;  Status DC


Sodium Chloride 100 meq/Potassium Phosphate 5 mmol/ Magnesium Sulfate 12 

meq/Calcium Gluconate 15 meq/ Multivitamins 10 ml/Chromium/ Copper/Manganese/ 

Seleni/Zn 0.5 ml/ Insulin Human Regular 35 unit/ Potassium Chloride 20 meq/ 

Total Parenteral Nutrition/Amino Acids/Dextrose/ Fat Emulsion Intravenous 1,400 

ml @  58.333 mls/ hr TPN  CONT IV  Last administered on 4/17/20at 22:59;  Start 

4/17/20 at 22:00;  Stop 4/18/20 at 21:59;  Status DC


Sodium Chloride 1,000 ml @  1,000 mls/hr Q1H PRN IV hypotension;  Start 4/18/20 

at 08:27;  Stop 4/18/20 at 14:26;  Status DC


Albumin Human 200 ml @  200 mls/hr 1X PRN  PRN IV Hypotension Last administered 

on 4/18/20at 09:18;  Start 4/18/20 at 08:30;  Stop 4/18/20 at 14:29;  Status DC


Sodium Chloride 1,000 ml @  400 mls/hr Q2H30M PRN IV PATENCY;  Start 4/18/20 at 

08:27;  Stop 4/18/20 at 20:26;  Status DC


Info (PHARMACY MONITORING -- do not chart) 1 each PRN DAILY  PRN MC SEE 

COMMENTS;  Start 4/18/20 at 08:30;  Status Cancel


Info (PHARMACY MONITORING -- do not chart) 1 each PRN DAILY  PRN MC SEE 

COMMENTS;  Start 4/18/20 at 08:30;  Stop 4/26/20 at 13:10;  Status DC


Sodium Chloride 100 meq/Potassium Chloride 40 meq/ Magnesium Sulfate 15 

meq/Calcium Gluconate 15 meq/ Multivitamins 10 ml/Chromium/ Copper/Manganese/ 

Seleni/Zn 0.5 ml/ Insulin Human Regular 35 unit/ Total Parenteral Nutrit

ion/Amino Acids/Dextrose/ Fat Emulsion Intravenous 1,400 ml @  58.333 mls/ hr 

TPN  CONT IV  Last administered on 4/18/20at 22:00;  Start 4/18/20 at 22:00;  

Stop 4/19/20 at 21:59;  Status DC


Potassium Chloride/Water 100 ml @  100 mls/hr 1X  ONCE IV  Last administered on 

4/18/20at 17:28;  Start 4/18/20 at 14:45;  Stop 4/18/20 at 15:44;  Status DC


Sodium Chloride 100 meq/Potassium Chloride 40 meq/ Magnesium Sulfate 15 

meq/Calcium Gluconate 15 meq/ Multivitamins 10 ml/Chromium/ Copper/Manganese/ 

Seleni/Zn 0.5 ml/ Insulin Human Regular 35 unit/ Total Parenteral 

Nutrition/Amino Acids/Dextrose/ Fat Emulsion Intravenous 1,400 ml @  58.333 mls/

hr TPN  CONT IV  Last administered on 4/19/20at 22:46;  Start 4/19/20 at 22:00; 

Stop 4/20/20 at 21:59;  Status DC


Sodium Chloride 100 meq/Potassium Chloride 40 meq/ Magnesium Sulfate 20 

meq/Calcium Gluconate 15 meq/ Multivitamins 10 ml/Chromium/ Copper/Manganese/ 

Seleni/Zn 0.5 ml/ Insulin Human Regular 35 unit/ Total Parenteral 

Nutrition/Amino Acids/Dextrose/ Fat Emulsion Intravenous 1,400 ml @  58.333 mls/

hr TPN  CONT IV  Last administered on 4/20/20at 22:31;  Start 4/20/20 at 22:00; 

Stop 4/21/20 at 21:59;  Status DC


Fentanyl Citrate (Fentanyl 2ml Vial) 50 mcg PRN Q2HR  PRN IVP PAIN Last 

administered on 4/27/20at 13:32;  Start 4/20/20 at 21:00;  Stop 4/28/20 at 

12:53;  Status DC


Fentanyl Citrate (Fentanyl 2ml Vial) 25 mcg PRN Q2HR  PRN IVP PAIN;  Start 

4/20/20 at 21:00;  Stop 4/28/20 at 12:54;  Status DC


Enoxaparin Sodium (Lovenox 100mg Syringe) 100 mg Q12HR SQ ;  Start 4/21/20 at 

21:00;  Status UNV


Amino Acids/ Glycerin/ Electrolytes 1,000 ml @  75 mls/hr [id number] IV ;  Start 

4/20/20 at 21:15;  Status UNV


Sodium Chloride 1,000 ml @  1,000 mls/hr Q1H PRN IV hypotension;  Start 4/21/20 

at 07:56;  Stop 4/21/20 at 13:55;  Status DC


Albumin Human 200 ml @  200 mls/hr 1X PRN  PRN IV Hypotension Last administered 

on 4/21/20at 08:40;  Start 4/21/20 at 08:00;  Stop 4/21/20 at 13:59;  Status DC


Sodium Chloride 1,000 ml @  400 mls/hr Q2H30M PRN IV PATENCY;  Start 4/21/20 at 

07:56;  Stop 4/21/20 at 19:55;  Status DC


Info (PHARMACY MONITORING -- do not chart) 1 each PRN DAILY  PRN MC SEE 

COMMENTS;  Start 4/21/20 at 08:00;  Status UNV


Info (PHARMACY MONITORING -- do not chart) 1 each PRN DAILY  PRN MC SEE 

COMMENTS;  Start 4/21/20 at 08:00;  Status UNV


Daptomycin 430 mg/ Sodium Chloride 50 ml @  100 mls/hr Q24H IV  Last 

administered on 4/21/20at 12:35;  Start 4/21/20 at 09:00;  Stop 4/21/20 at 

12:49;  Status DC


Sodium Chloride 100 meq/Potassium Chloride 40 meq/ Magnesium Sulfate 20 

meq/Calcium Gluconate 15 meq/ Multivitamins 10 ml/Chromium/ Copper/Manganese/ 

Seleni/Zn 0.5 ml/ Insulin Human Regular 35 unit/ Total Parenteral 

Nutrition/Amino Acids/Dextrose/ Fat Emulsion Intravenous 1,400 ml @  58.333 mls/

hr TPN  CONT IV  Last administered on 4/21/20at 21:26;  Start 4/21/20 at 22:00; 

Stop 4/22/20 at 21:59;  Status DC


Daptomycin 430 mg/ Sodium Chloride 50 ml @  100 mls/hr Q48H IV ;  Start 4/23/20 

at 09:00;  Stop 4/22/20 at 11:55;  Status DC


Sodium Chloride 100 meq/Potassium Chloride 40 meq/ Magnesium Sulfate 20 

meq/Calcium Gluconate 15 meq/ Multivitamins 10 ml/Chromium/ Copper/Manganese/ 

Seleni/Zn 0.5 ml/ Insulin Human Regular 35 unit/ Total Parenteral 

Nutrition/Amino Acids/Dextrose/ Fat Emulsion Intravenous 1,400 ml @  58.333 mls/

hr TPN  CONT IV  Last administered on 4/22/20at 22:27;  Start 4/22/20 at 22:00; 

Stop 4/23/20 at 21:59;  Status DC


Daptomycin 430 mg/ Sodium Chloride 50 ml @  100 mls/hr Q24H IV  Last 

administered on 4/24/20at 15:07;  Start 4/22/20 at 13:00;  Stop 4/25/20 at 

13:15;  Status DC


Sodium Chloride 100 meq/Potassium Chloride 40 meq/ Magnesium Sulfate 20 

meq/Calcium Gluconate 10 meq/ Multivitamins 10 ml/Chromium/ Copper/Manganese/ 

Seleni/Zn 0.5 ml/ Insulin Human Regular 35 unit/ Total Parenteral 

Nutrition/Amino Acids/Dextrose/ Fat Emulsion Intravenous 1,400 ml @  58.333 mls/

hr TPN  CONT IV  Last administered on 4/24/20at 00:06;  Start 4/23/20 at 22:00; 

Stop 4/24/20 at 21:59;  Status DC


Alteplase, Recombinant (Cathflo For Central Catheter Clearance) 1 mg 1X  ONCE 

INT CAT  Last administered on 4/24/20at 11:44;  Start 4/24/20 at 10:45;  Stop 

4/24/20 at 10:46;  Status DC


Ondansetron HCl (Zofran) 4 mg PRN Q6HRS  PRN IV NAUSEA/VOMITING;  Start 4/27/20 

at 07:00;  Stop 4/28/20 at 06:59;  Status DC


Fentanyl Citrate (Fentanyl 2ml Vial) 25 mcg PRN Q5MIN  PRN IV MILD PAIN 1-3;  

Start 4/27/20 at 07:00;  Stop 4/28/20 at 06:59;  Status DC


Fentanyl Citrate (Fentanyl 2ml Vial) 50 mcg PRN Q5MIN  PRN IV MODERATE TO SEVERE

PAIN Last administered on 4/27/20at 10:17;  Start 4/27/20 at 07:00;  Stop 

4/28/20 at 06:59;  Status DC


Ringer's Solution 1,000 ml @  30 mls/hr Q24H IV ;  Start 4/27/20 at 07:00;  Stop

4/27/20 at 18:59;  Status DC


Lidocaine HCl (Xylocaine-Mpf 1% 2ml Vial) 2 ml PRN 1X  PRN ID PRIOR TO IV START;

 Start 4/27/20 at 07:00;  Stop 4/28/20 at 06:59;  Status DC


Prochlorperazine Edisylate (Compazine) 5 mg PACU PRN  PRN IV NAUSEA, MRX1;  

Start 4/27/20 at 07:00;  Stop 4/28/20 at 06:59;  Status DC


Sodium Acetate 50 meq/Potassium Acetate 55 meq/ Magnesium Sulfate 20 meq/Calcium

Gluconate 10 meq/ Multivitamins 10 ml/Chromium/ Copper/Manganese/ Seleni/Zn 0.5 

ml/ Insulin Human Regular 35 unit/ Total Parenteral Nutrition/Amino Aci

ds/Dextrose/ Fat Emulsion Intravenous 1,400 ml @  58.333 mls/ hr TPN  CONT IV ; 

Start 4/24/20 at 22:00;  Stop 4/24/20 at 14:15;  Status DC


Sodium Acetate 50 meq/Potassium Acetate 55 meq/ Magnesium Sulfate 20 meq/Calcium

Gluconate 10 meq/ Multivitamins 10 ml/Chromium/ Copper/Manganese/ Seleni/Zn 0.5 

ml/ Insulin Human Regular 35 unit/ Total Parenteral Nutrition/Amino 

Acids/Dextrose/ Fat Emulsion Intravenous 1,800 ml @  75 mls/hr TPN  CONT IV  

Last administered on 4/24/20at 22:38;  Start 4/24/20 at 22:00;  Stop 4/25/20 at 

21:59;  Status DC


Sodium Chloride 1,000 ml @  1,000 mls/hr Q1H PRN IV hypotension;  Start 4/24/20 

at 15:31;  Stop 4/24/20 at 21:30;  Status DC


Diphenhydramine HCl (Benadryl) 25 mg 1X PRN  PRN IV ITCHING;  Start 4/24/20 at 

15:45;  Stop 4/25/20 at 15:44;  Status DC


Diphenhydramine HCl (Benadryl) 25 mg 1X PRN  PRN IV ITCHING;  Start 4/24/20 at 

15:45;  Stop 4/25/20 at 15:44;  Status DC


Sodium Chloride 1,000 ml @  400 mls/hr Q2H30M PRN IV PATENCY;  Start 4/24/20 at 

15:31;  Stop 4/25/20 at 03:30;  Status DC


Info (PHARMACY MONITORING -- do not chart) 1 each PRN DAILY  PRN MC SEE 

COMMENTS;  Start 4/24/20 at 15:45;  Stop 5/26/20 at 14:14;  Status DC


Sodium Acetate 50 meq/Potassium Acetate 55 meq/ Magnesium Sulfate 20 meq/Calcium

Gluconate 10 meq/ Multivitamins 10 ml/Chromium/ Copper/Manganese/ Seleni/Zn 0.5 

ml/ Insulin Human Regular 35 unit/ Total Parenteral Nutrition/Amino Acid

s/Dextrose/ Fat Emulsion Intravenous 1,800 ml @  75 mls/hr TPN  CONT IV  Last 

administered on 4/25/20at 22:03;  Start 4/25/20 at 22:00;  Stop 4/26/20 at 

21:59;  Status DC


Daptomycin 430 mg/ Sodium Chloride 50 ml @  100 mls/hr Q24H IV  Last 

administered on 4/30/20at 13:00;  Start 4/25/20 at 13:00;  Stop 4/30/20 at 

20:58;  Status DC


Heparin Sodium (Porcine) 1000 unit/Sodium Chloride 1,001 ml @  1,001 mls/hr 1X  

ONCE IRR ;  Start 4/27/20 at 06:00;  Stop 4/27/20 at 06:59;  Status DC


Potassium Acetate 55 meq/Magnesium Sulfate 20 meq/ Calcium Gluconate 10 meq/ 

Multivitamins 10 ml/Chromium/ Copper/Manganese/ Seleni/Zn 0.5 ml/ Insulin Human 

Regular 35 unit/ Total Parenteral Nutrition/Amino Acids/Dextrose/ Fat Emulsion 

Intravenous 1,920 ml @  80 mls/hr TPN  CONT IV  Last administered on 4/26/20at 

22:10;  Start 4/26/20 at 22:00;  Stop 4/27/20 at 21:59;  Status DC


Dexamethasone Sodium Phosphate (Decadron) 4 mg STK-MED ONCE .ROUTE ;  Start 

4/27/20 at 10:56;  Stop 4/27/20 at 10:57;  Status DC


Ondansetron HCl (Zofran) 4 mg STK-MED ONCE .ROUTE ;  Start 4/27/20 at 10:56;  

Stop 4/27/20 at 10:57;  Status DC


Rocuronium Bromide (Zemuron) 50 mg STK-MED ONCE .ROUTE ;  Start 4/27/20 at 

10:56;  Stop 4/27/20 at 10:57;  Status DC


Fentanyl Citrate (Fentanyl 2ml Vial) 100 mcg STK-MED ONCE .ROUTE ;  Start 

4/27/20 at 10:56;  Stop 4/27/20 at 10:57;  Status DC


Bupivacaine HCl/ Epinephrine Bitart (Sensorcain-Epi 0.5%-1:536323 Mpf) 30 ml 

STK-MED ONCE .ROUTE  Last administered on 4/27/20at 12:01;  Start 4/27/20 at 

10:58;  Stop 4/27/20 at 10:58;  Status DC


Cellulose (Surgicel Hemostat 2x14) 1 each STK-MED ONCE .ROUTE ;  Start 4/27/20 

at 10:58;  Stop 4/27/20 at 10:59;  Status DC


Iohexol (Omnipaque 300 Mg/ml) 50 ml STK-MED ONCE .ROUTE ;  Start 4/27/20 at 

10:58;  Stop 4/27/20 at 10:59;  Status DC


Cellulose (Surgicel Hemostat 4x8) 1 each STK-MED ONCE .ROUTE ;  Start 4/27/20 at

10:58;  Stop 4/27/20 at 10:59;  Status DC


Bisacodyl (Dulcolax Supp) 10 mg STK-MED ONCE .ROUTE ;  Start 4/27/20 at 10:59;  

Stop 4/27/20 at 10:59;  Status DC


Heparin Sodium (Porcine) 1000 unit/Sodium Chloride 1,001 ml @  1,001 mls/hr 1X  

ONCE IRR ;  Start 4/27/20 at 12:00;  Stop 4/27/20 at 12:59;  Status DC


Propofol 20 ml @ As Directed STK-MED ONCE IV ;  Start 4/27/20 at 11:05;  Stop 

4/27/20 at 11:05;  Status DC


Sevoflurane (Ultane) 90 ml STK-MED ONCE IH ;  Start 4/27/20 at 11:05;  Stop 

4/27/20 at 11:05;  Status DC


Sevoflurane (Ultane) 60 ml STK-MED ONCE IH ;  Start 4/27/20 at 12:26;  Stop 

4/27/20 at 12:27;  Status DC


Propofol 20 ml @ As Directed STK-MED ONCE IV ;  Start 4/27/20 at 12:26;  Stop 

4/27/20 at 12:27;  Status DC


Phenylephrine HCl (PHENYLEPHRINE in 0.9% NACL PF) 1 mg STK-MED ONCE IV ;  Start 

4/27/20 at 12:34;  Stop 4/27/20 at 12:34;  Status DC


Heparin Sodium (Porcine) (Heparin Sodium) 5,000 unit Q12HR SQ  Last administered

on 5/6/20at 20:57;  Start 4/27/20 at 21:00;  Stop 5/7/20 at 09:59;  Status DC


Sodium Chloride (Normal Saline Flush) 3 ml QSHIFT  PRN IV AFTER MEDS AND BLOOD 

DRAWS;  Start 4/27/20 at 13:45;  Status Cancel


Naloxone HCl (Narcan) 0.4 mg PRN Q2MIN  PRN IV SEE INSTRUCTIONS Last 

administered on 6/6/20at 15:15;  Start 4/27/20 at 13:45;  Stop 7/1/20 at 16:00; 

Status DC


Sodium Chloride 1,000 ml @  25 mls/hr Q24H IV  Last administered on 5/26/20at 

13:37;  Start 4/27/20 at 13:37;  Stop 5/29/20 at 13:09;  Status DC


Naloxone HCl (Narcan) 0.4 mg PRN Q2MIN  PRN IV SEE INSTRUCTIONS;  Start 4/27/20 

at 14:30;  Status UNV


Sodium Chloride 1,000 ml @  25 mls/hr Q24H IV ;  Start 4/27/20 at 14:30;  Status

UNV


Hydromorphone HCl 30 ml @ 0 mls/hr CONT PRN  PRN IV PER PROTOCOL Last adm

inistered on 5/2/20at 16:08;  Start 4/27/20 at 14:30;  Stop 5/4/20 at 08:55;  

Status DC


Potassium Acetate 55 meq/Magnesium Sulfate 20 meq/ Calcium Gluconate 10 meq/ 

Multivitamins 10 ml/Chromium/ Copper/Manganese/ Seleni/Zn 0.5 ml/ Insulin Human 

Regular 35 unit/ Total Parenteral Nutrition/Amino Acids/Dextrose/ Fat Emulsion 

Intravenous 1,920 ml @  80 mls/hr TPN  CONT IV  Last administered on 4/27/20at 

22:01;  Start 4/27/20 at 22:00;  Stop 4/28/20 at 21:59;  Status DC


Bumetanide (Bumex) 2 mg BID92 IV  Last administered on 5/1/20at 13:50;  Start 

4/28/20 at 14:00;  Stop 5/2/20 at 14:10;  Status DC


Meropenem 1 gm/ Sodium Chloride 100 ml @  200 mls/hr Q8HRS IV  Last administered

on 5/22/20at 05:53;  Start 4/28/20 at 14:00;  Stop 5/22/20 at 09:31;  Status DC


Potassium Acetate 55 meq/Magnesium Sulfate 20 meq/ Calcium Gluconate 10 meq/ 

Multivitamins 10 ml/Chromium/ Copper/Manganese/ Seleni/Zn 0.5 ml/ Insulin Human 

Regular 35 unit/ Total Parenteral Nutrition/Amino Acids/Dextrose/ Fat Emulsion 

Intravenous 1,920 ml @  80 mls/hr TPN  CONT IV  Last administered on 4/28/20at 

22:02;  Start 4/28/20 at 22:00;  Stop 4/29/20 at 21:59;  Status DC


Hydromorphone HCl (Dilaudid Standard PCA) 12 mg STK-MED ONCE IV ;  Start 4/27/20

at 14:35;  Stop 4/28/20 at 13:53;  Status DC


Artificial Tears (Artificial Tears) 1 drop PRN Q15MIN  PRN OU DRY EYE Last 

administered on 6/23/20at 21:17;  Start 4/29/20 at 05:30


Hydromorphone HCl (Dilaudid Standard PCA) 12 mg STK-MED ONCE IV ;  Start 4/28/20

at 12:05;  Stop 4/29/20 at 09:15;  Status DC


Potassium Acetate 65 meq/Magnesium Sulfate 20 meq/ Calcium Gluconate 10 meq/ 

Multivitamins 10 ml/Chromium/ Copper/Manganese/ Seleni/Zn 0.5 ml/ Insulin Human 

Regular 30 unit/ Total Parenteral Nutrition/Amino Acids/Dextrose/ Fat Emulsion 

Intravenous 1,920 ml @  80 mls/hr TPN  CONT IV  Last administered on 4/29/20at 

22:22;  Start 4/29/20 at 22:00;  Stop 4/30/20 at 21:59;  Status DC


Cyclobenzaprine HCl (Flexeril) 10 mg PRN Q6HRS  PRN PO MUSCLE SPASMS Last 

administered on 8/14/20at 00:29;  Start 4/30/20 at 10:45


Potassium Acetate 55 meq/Magnesium Sulfate 20 meq/ Calcium Gluconate 10 meq/ 

Multivitamins 10 ml/Chromium/ Copper/Manganese/ Seleni/Zn 0.5 ml/ Insulin Human 

Regular 30 unit/ Total Parenteral Nutrition/Amino Acids/Dextrose/ Fat Emulsion 

Intravenous 1,920 ml @  80 mls/hr TPN  CONT IV  Last administered on 5/1/20at 

01:00;  Start 4/30/20 at 22:00;  Stop 5/1/20 at 21:59;  Status DC


Magnesium Sulfate 50 ml @ 25 mls/hr 1X  ONCE IV  Last administered on 4/30/20at 

17:18;  Start 4/30/20 at 12:45;  Stop 4/30/20 at 14:44;  Status DC


Potassium Chloride/Water 100 ml @  100 mls/hr 1X  ONCE IV  Last administered on 

5/1/20at 11:27;  Start 5/1/20 at 12:00;  Stop 5/1/20 at 12:59;  Status DC


Hydromorphone HCl (Dilaudid Standard PCA) 12 mg STK-MED ONCE IV ;  Start 4/29/20

at 10:50;  Stop 5/1/20 at 11:02;  Status DC


Hydromorphone HCl (Dilaudid Standard PCA) 12 mg STK-MED ONCE IV ;  Start 4/30/20

at 13:47;  Stop 5/1/20 at 11:03;  Status DC


Potassium Acetate 30 meq/Magnesium Sulfate 20 meq/ Calcium Gluconate 10 meq/ 

Multivitamins 10 ml/Chromium/ Copper/Manganese/ Seleni/Zn 0.5 ml/ Insulin Human 

Regular 30 unit/ Potassium Chloride 30 meq/ Total Parenteral Nutrition/Amino 

Acids/Dextrose/ Fat Emulsion Intravenous 1,920 ml @  80 mls/hr TPN  CONT IV  

Last administered on 5/1/20at 22:34;  Start 5/1/20 at 22:00;  Stop 5/2/20 at 

21:59;  Status DC


Potassium Chloride/Water 100 ml @  100 mls/hr Q1H IV  Last administered on 5/2/ 20at 13:05;  Start 5/2/20 at 07:00;  Stop 5/2/20 at 10:59;  Status DC


Magnesium Sulfate 50 ml @ 25 mls/hr 1X  ONCE IV  Last administered on 5/2/20at 

10:34;  Start 5/2/20 at 10:30;  Stop 5/2/20 at 12:29;  Status DC


Potassium Chloride 75 meq/ Magnesium Sulfate 20 meq/Calcium Gluconate 10 meq/ 

Multivitamins 10 ml/Chromium/ Copper/Manganese/ Seleni/Zn 0.5 ml/ Insulin Human 

Regular 30 unit/ Total Parenteral Nutrition/Amino Acids/Dextrose/ Fat Emulsion 

Intravenous 1,920 ml @  80 mls/hr TPN  CONT IV  Last administered on 5/2/20at 

21:51;  Start 5/2/20 at 22:00;  Stop 5/3/20 at 22:00;  Status DC


Potassium Chloride 75 meq/ Magnesium Sulfate 20 meq/Calcium Gluconate 10 meq/ 

Multivitamins 10 ml/Chromium/ Copper/Manganese/ Seleni/Zn 0.5 ml/ Insulin Human 

Regular 25 unit/ Total Parenteral Nutrition/Amino Acids/Dextrose/ Fat Emulsion 

Intravenous 1,920 ml @  80 mls/hr TPN  CONT IV  Last administered on 5/3/20at 

22:04;  Start 5/3/20 at 22:00;  Stop 5/4/20 at 21:59;  Status DC


Hydromorphone HCl (Dilaudid) 0.4 mg PRN Q4HRS  PRN IVP PAIN Last administered on

5/4/20at 10:57;  Start 5/4/20 at 09:00;  Stop 5/4/20 at 18:59;  Status DC


Micafungin Sodium 100 mg/Dextrose 100 ml @  100 mls/hr Q24H IV  Last 

administered on 5/26/20at 12:17;  Start 5/4/20 at 11:00;  Stop 5/27/20 at 09:59;

 Status DC


Daptomycin 485 mg/ Sodium Chloride 50 ml @  100 mls/hr Q24H IV  Last 

administered on 5/11/20at 13:10;  Start 5/4/20 at 11:00;  Stop 5/12/20 at 07:44;

 Status DC


Potassium Chloride 75 meq/ Magnesium Sulfate 15 meq/Calcium Gluconate 8 meq/ 

Multivitamins 10 ml/Chromium/ Copper/Manganese/ Seleni/Zn 0.5 ml/ Insulin Human 

Regular 25 unit/ Total Parenteral Nutrition/Amino Acids/Dextrose/ Fat Emulsion 

Intravenous 1,920 ml @  80 mls/hr TPN  CONT IV  Last administered on 5/4/20at 

23:08;  Start 5/4/20 at 22:00;  Stop 5/5/20 at 21:59;  Status DC


Haloperidol Lactate (Haldol Inj) 3 mg 1X  ONCE IVP  Last administered on 

5/4/20at 14:37;  Start 5/4/20 at 14:30;  Stop 5/4/20 at 14:31;  Status DC


Hydromorphone HCl (Dilaudid) 1 mg PRN Q4HRS  PRN IVP PAIN Last administered on 

5/18/20at 06:25;  Start 5/4/20 at 19:00;  Stop 5/18/20 at 17:10;  Status DC


Potassium Chloride 75 meq/ Magnesium Sulfate 15 meq/Calcium Gluconate 8 meq/ 

Multivitamins 10 ml/Chromium/ Copper/Manganese/ Seleni/Zn 0.5 ml/ Insulin Human 

Regular 20 unit/ Total Parenteral Nutrition/Amino Acids/Dextrose/ Fat Emulsion 

Intravenous 1,920 ml @  80 mls/hr TPN  CONT IV  Last administered on 5/5/20at 

22:10;  Start 5/5/20 at 22:00;  Stop 5/6/20 at 21:59;  Status DC


Lidocaine HCl (Buffered Lidocaine 1%) 3 ml STK-MED ONCE .ROUTE ;  Start 5/6/20 

at 11:31;  Stop 5/6/20 at 11:31;  Status DC


Lidocaine HCl (Buffered Lidocaine 1%) 3 ml STK-MED ONCE .ROUTE ;  Start 5/6/20 

at 12:28;  Stop 5/6/20 at 12:29;  Status DC


Lidocaine HCl (Buffered Lidocaine 1%) 6 ml 1X  ONCE INJ  Last administered on 

5/6/20at 12:53;  Start 5/6/20 at 12:45;  Stop 5/6/20 at 12:46;  Status DC


Potassium Chloride 75 meq/ Magnesium Sulfate 15 meq/Calcium Gluconate 8 meq/ 

Multivitamins 10 ml/Chromium/ Copper/Manganese/ Seleni/Zn 0.5 ml/ Insulin Human 

Regular 20 unit/ Total Parenteral Nutrition/Amino Acids/Dextrose/ Fat Emulsion 

Intravenous 1,920 ml @  80 mls/hr TPN  CONT IV  Last administered on 5/6/20at 

22:00;  Start 5/6/20 at 22:00;  Stop 5/7/20 at 21:59;  Status DC


Potassium Chloride 75 meq/ Magnesium Sulfate 15 meq/Calcium Gluconate 8 meq/ 

Multivitamins 10 ml/Chromium/ Copper/Manganese/ Seleni/Zn 0.5 ml/ Insulin Human 

Regular 15 unit/ Total Parenteral Nutrition/Amino Acids/Dextrose/ Fat Emulsion 

Intravenous 1,920 ml @  80 mls/hr TPN  CONT IV  Last administered on 5/7/20at 

22:28;  Start 5/7/20 at 22:00;  Stop 5/8/20 at 21:59;  Status DC


Vecuronium Bromide (Norcuron Bolus) 6 mg PRN Q6HRS  PRN IV VENT ASYNCHRONY;  

Start 5/7/20 at 19:15;  Stop 5/7/20 at 19:35;  Status DC


Bumetanide (Bumex) 2 mg 1X  ONCE IV  Last administered on 5/7/20at 22:09;  Start

5/7/20 at 19:45;  Stop 5/7/20 at 19:46;  Status DC


Lidocaine HCl (Buffered Lidocaine 1%) 3 ml STK-MED ONCE .ROUTE ;  Start 5/8/20 

at 07:59;  Stop 5/8/20 at 07:59;  Status DC


Midazolam HCl (Versed) 5 mg STK-MED ONCE .ROUTE ;  Start 5/8/20 at 08:36;  Stop 

5/8/20 at 08:36;  Status DC


Fentanyl Citrate (Fentanyl 5ml Vial) 250 mcg STK-MED ONCE .ROUTE ;  Start 5/8/20

at 08:36;  Stop 5/8/20 at 08:37;  Status DC


Lidocaine HCl (Buffered Lidocaine 1%) 3 ml 1X  ONCE IJ  Last administered on 

5/8/20at 09:30;  Start 5/8/20 at 09:15;  Stop 5/8/20 at 09:16;  Status DC


Midazolam HCl (Versed) 5 mg 1X  ONCE IV  Last administered on 5/8/20at 09:30;  

Start 5/8/20 at 09:15;  Stop 5/8/20 at 09:16;  Status DC


Fentanyl Citrate (Fentanyl 5ml Vial) 250 mcg 1X  ONCE IV  Last administered on 

5/8/20at 09:30;  Start 5/8/20 at 09:15;  Stop 5/8/20 at 09:16;  Status DC


Bumetanide (Bumex) 2 mg DAILY IV  Last administered on 5/18/20at 08:07;  Start 

5/8/20 at 10:00;  Stop 5/18/20 at 17:15;  Status DC


Potassium Chloride 75 meq/ Magnesium Sulfate 15 meq/ Multivitamins 10 

ml/Chromium/ Copper/Manganese/ Seleni/Zn 0.5 ml/ Insulin Human Regular 15 unit/ 

Total Parenteral Nutrition/Amino Acids/Dextrose/ Fat Emulsion Intravenous 1,920 

ml @  80 mls/hr TPN  CONT IV  Last administered on 5/8/20at 21:59;  Start 5/8/20

at 22:00;  Stop 5/9/20 at 21:59;  Status DC


Metoclopramide HCl (Reglan Vial) 10 mg PRN Q3HRS  PRN IVP NAUSEA/VOMITING-3rd 

choice Last administered on 5/14/20at 04:25;  Start 5/9/20 at 16:45


Potassium Chloride 75 meq/ Magnesium Sulfate 15 meq/ Multivitamins 10 

ml/Chromium/ Copper/Manganese/ Seleni/Zn 0.5 ml/ Insulin Human Regular 15 unit/ 

Total Parenteral Nutrition/Amino Acids/Dextrose/ Fat Emulsion Intravenous 1,920 

ml @  80 mls/hr TPN  CONT IV  Last administered on 5/9/20at 22:41;  Start 5/9/20

at 22:00;  Stop 5/10/20 at 21:59;  Status DC


Magnesium Sulfate 50 ml @ 25 mls/hr 1X  ONCE IV  Last administered on 5/10/20at 

10:44;  Start 5/10/20 at 09:00;  Stop 5/10/20 at 10:59;  Status DC


Potassium Chloride/Water 100 ml @  100 mls/hr 1X  ONCE IV  Last administered on 

5/10/20at 09:37;  Start 5/10/20 at 09:00;  Stop 5/10/20 at 09:59;  Status DC


Duloxetine HCl (Cymbalta) 30 mg DAILY PO  Last administered on 5/11/20at 09:48; 

Start 5/10/20 at 14:00;  Stop 5/13/20 at 10:25;  Status DC


Potassium Chloride 80 meq/ Magnesium Sulfate 20 meq/ Multivitamins 10 

ml/Chromium/ Copper/Manganese/ Seleni/Zn 0.5 ml/ Insulin Human Regular 15 unit/ 

Total Parenteral Nutrition/Amino Acids/Dextrose/ Fat Emulsion Intravenous 1,920 

ml @  80 mls/hr TPN  CONT IV  Last administered on 5/10/20at 21:42;  Start 

5/10/20 at 22:00;  Stop 5/11/20 at 21:59;  Status DC


Potassium Chloride 80 meq/ Magnesium Sulfate 20 meq/ Multivitamins 10 

ml/Chromium/ Copper/Manganese/ Seleni/Zn 0.5 ml/ Insulin Human Regular 15 unit/ 

Total Parenteral Nutrition/Amino Acids/Dextrose/ Fat Emulsion Intravenous 1,920 

ml @  80 mls/hr TPN  CONT IV  Last administered on 5/11/20at 22:20;  Start 

5/11/20 at 22:00;  Stop 5/12/20 at 21:59;  Status DC


Lidocaine HCl (Buffered Lidocaine 1%) 3 ml STK-MED ONCE .ROUTE ;  Start 5/12/20 

at 09:54;  Stop 5/12/20 at 09:55;  Status DC


Hydromorphone HCl (Dilaudid Standard PCA) 12 mg STK-MED ONCE IV ;  Start 5/1/20 

at 15:50;  Stop 5/12/20 at 11:24;  Status DC


Potassium Chloride 80 meq/ Magnesium Sulfate 20 meq/ Multivitamins 10 

ml/Chromium/ Copper/Manganese/ Seleni/Zn 0.5 ml/ Insulin Human Regular 15 unit/ 

Total Parenteral Nutrition/Amino Acids/Dextrose/ Fat Emulsion Intravenous 1,920 

ml @  80 mls/hr TPN  CONT IV  Last administered on 5/12/20at 21:40;  Start 

5/12/20 at 22:00;  Stop 5/13/20 at 21:59;  Status DC


Lidocaine HCl (Buffered Lidocaine 1%) 6 ml 1X  ONCE INJ  Last administered on 

5/12/20at 14:15;  Start 5/12/20 at 14:15;  Stop 5/12/20 at 14:16;  Status DC


Potassium Chloride 80 meq/ Magnesium Sulfate 20 meq/ Multivitamins 10 

ml/Chromium/ Copper/Manganese/ Seleni/Zn 1 ml/ Insulin Human Regular 15 unit/ 

Total Parenteral Nutrition/Amino Acids/Dextrose/ Fat Emulsion Intravenous 1,920 

ml @  80 mls/hr TPN  CONT IV  Last administered on 5/13/20at 22:04;  Start 

5/13/20 at 22:00;  Stop 5/14/20 at 21:59;  Status DC


Potassium Chloride/Water 100 ml @  100 mls/hr 1X  ONCE IV  Last administered on 

5/14/20at 11:34;  Start 5/14/20 at 11:00;  Stop 5/14/20 at 11:59;  Status DC


Potassium Chloride 90 meq/ Magnesium Sulfate 20 meq/ Multivitamins 10 

ml/Chromium/ Copper/Manganese/ Seleni/Zn 1 ml/ Insulin Human Regular 15 unit/ 

Total Parenteral Nutrition/Amino Acids/Dextrose/ Fat Emulsion Intravenous 1,920 

ml @  80 mls/hr TPN  CONT IV  Last administered on 5/14/20at 22:57;  Start 

5/14/20 at 22:00;  Stop 5/15/20 at 21:59;  Status DC


Potassium Chloride 90 meq/ Magnesium Sulfate 20 meq/ Multivitamins 10 

ml/Chromium/ Copper/Manganese/ Seleni/Zn 1 ml/ Insulin Human Regular 15 unit/ 

Total Parenteral Nutrition/Amino Acids/Dextrose/ Fat Emulsion Intravenous 1,920 

ml @  80 mls/hr TPN  CONT IV  Last administered on 5/15/20at 22:48;  Start 

5/15/20 at 22:00;  Stop 5/16/20 at 21:59;  Status DC


Potassium Chloride 90 meq/ Magnesium Sulfate 20 meq/ Multivitamins 10 

ml/Chromium/ Copper/Manganese/ Seleni/Zn 1 ml/ Insulin Human Regular 15 unit/ 

Total Parenteral Nutrition/Amino Acids/Dextrose/ Fat Emulsion Intravenous 1,890 

ml @  78.75 mls/ hr TPN  CONT IV  Last administered on 5/16/20at 22:15;  Start 

5/16/20 at 22:00;  Stop 5/17/20 at 21:59;  Status DC


Linezolid/Dextrose 300 ml @  300 mls/hr Q12HR IV  Last administered on 5/19/20at

21:08;  Start 5/17/20 at 09:00;  Stop 5/20/20 at 08:11;  Status DC


Daptomycin 450 mg/ Sodium Chloride 50 ml @  100 mls/hr Q24H IV  Last 

administered on 5/20/20at 09:25;  Start 5/17/20 at 09:00;  Stop 5/21/20 at 

08:30;  Status DC


Potassium Chloride 90 meq/ Magnesium Sulfate 20 meq/ Multivitamins 10 

ml/Chromium/ Copper/Manganese/ Seleni/Zn 1 ml/ Insulin Human Regular 15 unit/ 

Total Parenteral Nutrition/Amino Acids/Dextrose/ Fat Emulsion Intravenous 1,890 

ml @  78.75 mls/ hr TPN  CONT IV  Last administered on 5/17/20at 21:34;  Start 

5/17/20 at 22:00;  Stop 5/18/20 at 21:59;  Status DC


Lorazepam (Ativan Inj) 2 mg STK-MED ONCE .ROUTE ;  Start 5/17/20 at 14:58;  Stop

5/17/20 at 14:58;  Status DC


Metoprolol Tartrate (Lopressor Vial) 5 mg 1X  ONCE IVP  Last administered on 

5/17/20at 15:31;  Start 5/17/20 at 15:15;  Stop 5/17/20 at 15:16;  Status DC


Lorazepam (Ativan Inj) 2 mg 1X  ONCE IVP  Last administered on 5/17/20at 15:30; 

Start 5/17/20 at 15:15;  Stop 5/17/20 at 15:16;  Status DC


Enoxaparin Sodium (Lovenox 40mg Syringe) 40 mg Q24H SQ  Last administered on 

6/5/20at 17:44;  Start 5/17/20 at 17:00;  Stop 6/7/20 at 06:50;  Status DC


Lorazepam (Ativan Inj) 1 mg PRN Q4HRS  PRN IVP ANXIETY / AGITATION MILD-MOD Last

administered on 5/31/20at 15:55;  Start 5/17/20 at 19:15;  Stop 6/2/20 at 11:45;

 Status DC


Lorazepam (Ativan Inj) 2 mg PRN Q4HRS  PRN IVP ANXIETY / AGITATION SEVERE Last 

administered on 6/1/20at 07:55;  Start 5/17/20 at 19:15;  Stop 6/2/20 at 11:45; 

Status DC


Fentanyl Citrate (Fentanyl 2ml Vial) 50 mcg PRN Q4HRS  PRN IVP SEVERE PAIN Last 

administered on 6/13/20at 05:15;  Start 5/18/20 at 13:15;  Stop 6/14/20 at 

09:29;  Status DC


Fentanyl Citrate (Fentanyl 2ml Vial) 25 mcg PRN Q4HRS  PRN IVP MODERATE PAIN 

Last administered on 6/13/20at 00:27;  Start 5/18/20 at 13:15;  Stop 6/14/20 at 

09:30;  Status DC


Potassium Chloride 90 meq/ Magnesium Sulfate 20 meq/ Multivitamins 10 

ml/Chromium/ Copper/Manganese/ Seleni/Zn 1 ml/ Insulin Human Regular 15 unit/ 

Total Parenteral Nutrition/Amino Acids/Dextrose/ Fat Emulsion Intravenous 1,890 

ml @  78.75 mls/ hr TPN  CONT IV  Last administered on 5/18/20at 22:18;  Start 

5/18/20 at 22:00;  Stop 5/19/20 at 21:59;  Status DC


Furosemide (Lasix) 40 mg 1X  ONCE IVP  Last administered on 5/18/20at 21:51;  

Start 5/18/20 at 21:45;  Stop 5/18/20 at 21:48;  Status DC


Albumin Human 100 ml @  100 mls/hr 1X PRN  PRN IV SEE COMMENTS;  Start 5/19/20 

at 01:30;  Stop 8/3/20 at 09:52;  Status DC


Furosemide (Lasix) 40 mg [id number] IVP  Last administered on 6/3/20at 08:04;  Start 

5/19/20 at 14:00;  Stop 6/3/20 at 13:07;  Status DC


Potassium Chloride 90 meq/ Magnesium Sulfate 20 meq/ Multivitamins 10 

ml/Chromium/ Copper/Manganese/ Seleni/Zn 1 ml/ Insulin Human Regular 15 unit/ 

Total Parenteral Nutrition/Amino Acids/Dextrose/ Fat Emulsion Intravenous 1,800 

ml @  75 mls/hr TPN  CONT IV  Last administered on 5/19/20at 22:31;  Start 

5/19/20 at 22:00;  Stop 5/20/20 at 21:59;  Status DC


Potassium Chloride 90 meq/ Magnesium Sulfate 20 meq/ Multivitamins 10 

ml/Chromium/ Copper/Manganese/ Seleni/Zn 1 ml/ Insulin Human Regular 15 unit/ 

Total Parenteral Nutrition/Amino Acids/Dextrose/ Fat Emulsion Intravenous 1,800 

ml @  75 mls/hr TPN  CONT IV  Last administered on 5/20/20at 22:28;  Start 

5/20/20 at 22:00;  Stop 5/21/20 at 21:59;  Status DC


Potassium Chloride 110 meq/ Magnesium Sulfate 20 meq/ Multivitamins 10 

ml/Chromium/ Copper/Manganese/ Seleni/Zn 1 ml/ Insulin Human Regular 15 unit/ 

Total Parenteral Nutrition/Amino Acids/Dextrose/ Fat Emulsion Intravenous 1,800 

ml @  75 mls/hr TPN  CONT IV  Last administered on 5/21/20at 22:01;  Start 

5/21/20 at 22:00;  Stop 5/22/20 at 21:59;  Status DC


Saliva Substitute (Biotene Moisturizing Mouth) 2 spray PRN Q15MIN  PRN PO DRY 

MOUTH;  Start 5/21/20 at 11:00


Potassium Chloride 110 meq/ Magnesium Sulfate 20 meq/ Multivitamins 10 

ml/Chromium/ Copper/Manganese/ Seleni/Zn 1 ml/ Insulin Human Regular 15 unit/ 

Total Parenteral Nutrition/Amino Acids/Dextrose/ Fat Emulsion Intravenous 1,800 

ml @  75 mls/hr TPN  CONT IV  Last administered on 5/22/20at 22:21;  Start 

5/22/20 at 22:00;  Stop 5/23/20 at 21:59;  Status DC


Potassium Chloride 110 meq/ Magnesium Sulfate 20 meq/ Multivitamins 10 

ml/Chromium/ Copper/Manganese/ Seleni/Zn 1 ml/ Insulin Human Regular 15 unit/ 

Total Parenteral Nutrition/Amino Acids/Dextrose/ Fat Emulsion Intravenous 1,800 

ml @  75 mls/hr TPN  CONT IV  Last administered on 5/23/20at 22:04;  Start 

5/23/20 at 22:00;  Stop 5/24/20 at 21:59;  Status DC


Potassium Chloride 110 meq/ Magnesium Sulfate 20 meq/ Multivitamins 10 

ml/Chromium/ Copper/Manganese/ Seleni/Zn 1 ml/ Insulin Human Regular 15 unit/ 

Total Parenteral Nutrition/Amino Acids/Dextrose/ Fat Emulsion Intravenous 1,800 

ml @  75 mls/hr TPN  CONT IV  Last administered on 5/24/20at 22:48;  Start 

5/24/20 at 22:00;  Stop 5/25/20 at 21:59;  Status DC


Potassium Chloride 70 meq/ Magnesium Sulfate 20 meq/ Multivitamins 10 

ml/Chromium/ Copper/Manganese/ Seleni/Zn 1 ml/ Insulin Human Regular 15 unit/ 

Total Parenteral Nutrition/Amino Acids/Dextrose/ Fat Emulsion Intravenous 1,800 

ml @  75 mls/hr TPN  CONT IV  Last administered on 5/25/20at 21:39;  Start 

5/25/20 at 22:00;  Stop 5/26/20 at 21:59;  Status DC


Meropenem 500 mg/ Sodium Chloride 50 ml @  100 mls/hr Q6HRS IV  Last 

administered on 5/27/20at 06:02;  Start 5/25/20 at 18:00;  Stop 5/27/20 at 

09:59;  Status DC


Barium Sulfate (Varibar Thin Liquid Apple) 148 gm 1X  ONCE PO ;  Start 5/26/20 

at 11:45;  Stop 5/26/20 at 11:49;  Status DC


Potassium Chloride 70 meq/ Magnesium Sulfate 20 meq/ Multivitamins 10 

ml/Chromium/ Copper/Manganese/ Seleni/Zn 1 ml/ Insulin Human Regular 15 unit/ 

Total Parenteral Nutrition/Amino Acids/Dextrose/ Fat Emulsion Intravenous 1,800 

ml @  75 mls/hr TPN  CONT IV  Last administered on 5/26/20at 22:27;  Start 

5/26/20 at 22:00;  Stop 5/27/20 at 21:59;  Status DC


Piperacillin Sod/ Tazobactam Sod 3.375 gm/Sodium Chloride 50 ml @  100 mls/hr 

Q6HRS IV  Last administered on 6/4/20at 06:10;  Start 5/27/20 at 12:00;  Stop 

6/4/20 at 07:26;  Status DC


Potassium Chloride 70 meq/ Magnesium Sulfate 20 meq/ Multivitamins 10 

ml/Chromium/ Copper/Manganese/ Seleni/Zn 1 ml/ Insulin Human Regular 15 unit/ 

Total Parenteral Nutrition/Amino Acids/Dextrose/ Fat Emulsion Intravenous 1,800 

ml @  75 mls/hr TPN  CONT IV  Last administered on 5/27/20at 22:03;  Start 

5/27/20 at 22:00;  Stop 5/28/20 at 21:59;  Status DC


Potassium Chloride 70 meq/ Magnesium Sulfate 20 meq/ Multivitamins 10 

ml/Chromium/ Copper/Manganese/ Seleni/Zn 1 ml/ Insulin Human Regular 15 unit/ 

Total Parenteral Nutrition/Amino Acids/Dextrose/ Fat Emulsion Intravenous 1,800 

ml @  75 mls/hr TPN  CONT IV  Last administered on 5/28/20at 22:33;  Start 

5/28/20 at 22:00;  Stop 5/29/20 at 21:59;  Status DC


Potassium Chloride 70 meq/ Magnesium Sulfate 20 meq/ Multivitamins 10 

ml/Chromium/ Copper/Manganese/ Seleni/Zn 1 ml/ Insulin Human Regular 15 unit/ 

Total Parenteral Nutrition/Amino Acids/Dextrose/ Fat Emulsion Intravenous 1,800 

ml @  75 mls/hr TPN  CONT IV  Last administered on 5/29/20at 23:13;  Start 

5/29/20 at 22:00;  Stop 5/30/20 at 21:59;  Status DC


Potassium Chloride 80 meq/ Magnesium Sulfate 20 meq/ Multivitamins 10 

ml/Chromium/ Copper/Manganese/ Seleni/Zn 1 ml/ Insulin Human Regular 15 unit/ 

Total Parenteral Nutrition/Amino Acids/Dextrose/ Fat Emulsion Intravenous 1,800 

ml @  75 mls/hr TPN  CONT IV  Last administered on 5/30/20at 22:30;  Start 

5/30/20 at 22:00;  Stop 5/31/20 at 21:59;  Status DC


Potassium Chloride 80 meq/ Magnesium Sulfate 20 meq/ Multivitamins 10 

ml/Chromium/ Copper/Manganese/ Seleni/Zn 1 ml/ Insulin Human Regular 15 unit/ 

Total Parenteral Nutrition/Amino Acids/Dextrose/ Fat Emulsion Intravenous 1,800 

ml @  75 mls/hr TPN  CONT IV  Last administered on 5/31/20at 21:54;  Start 

5/31/20 at 22:00;  Stop 6/1/20 at 21:59;  Status DC


Potassium Chloride/Water 100 ml @  100 mls/hr 1X  ONCE IV  Last administered on 

6/1/20at 10:15;  Start 6/1/20 at 10:00;  Stop 6/1/20 at 10:59;  Status DC


Potassium Chloride 90 meq/ Magnesium Sulfate 20 meq/ Multivitamins 10 

ml/Chromium/ Copper/Manganese/ Seleni/Zn 1 ml/ Insulin Human Regular 20 unit/ To

[place] Parenteral Nutrition/Amino Acids/Dextrose/ Fat Emulsion Intravenous 1,800 ml

@  75 mls/hr TPN  CONT IV  Last administered on 6/1/20at 22:28;  Start 6/1/20 at

22:00;  Stop 6/2/20 at 21:59;  Status DC


Potassium Chloride 90 meq/ Magnesium Sulfate 20 meq/ Multivitamins 10 

ml/Chromium/ Copper/Manganese/ Seleni/Zn 1 ml/ Insulin Human Regular 20 unit/ 

Total Parenteral Nutrition/Amino Acids/Dextrose/ Fat Emulsion Intravenous 1,800 

ml @  75 mls/hr TPN  CONT IV  Last administered on 6/2/20at 22:08;  Start 6/2/20

at 22:00;  Stop 6/3/20 at 21:59;  Status DC


Lorazepam (Ativan Inj) 0.25 mg PRN Q4HRS  PRN IVP ANXIETY / AGITATION Last 

administered on 8/13/20at 15:56;  Start 6/3/20 at 07:30


Potassium Chloride 90 meq/ Magnesium Sulfate 20 meq/ Multivitamins 10 

ml/Chromium/ Copper/Manganese/ Seleni/Zn 1 ml/ Insulin Human Regular 20 unit/ 

Total Parenteral Nutrition/Amino Acids/Dextrose/ Fat Emulsion Intravenous 1,800 

ml @  75 mls/hr TPN  CONT IV  Last administered on 6/3/20at 23:13;  Start 6/3/20

at 22:00;  Stop 6/4/20 at 21:59;  Status DC


Furosemide (Lasix) 40 mg DAILY IVP  Last administered on 6/5/20at 11:14;  Start 

6/3/20 at 13:30;  Stop 6/7/20 at 09:12;  Status DC


Fluoxetine HCl (PROzac) 20 mg QHS PEG  Last administered on 8/14/20at 20:45;  

Start 6/4/20 at 21:00


Fentanyl (Duragesic 50mcg/ Hr Patch) 1 patch Q72H TD  Last administered on 

6/4/20at 21:22;  Start 6/4/20 at 21:00;  Stop 6/13/20 at 12:00;  Status DC


Potassium Chloride 40 meq/ Potassium Acetate 60 meq/Magnesium Sulfate 10 meq/ 

Multivitamins 10 ml/Chromium/ Copper/Manganese/ Seleni/Zn 1 ml/ Insulin Human 

Regular 20 unit/ Total Parenteral Nutrition/Amino Acids/Dextrose/ Fat Emulsion 

Intravenous 1,800 ml @  75 mls/hr TPN  CONT IV  Last administered on 6/5/20at 

00:03;  Start 6/4/20 at 22:00;  Stop 6/5/20 at 21:59;  Status DC


Potassium Acetate 80 meq/Magnesium Sulfate 5 meq/ Multivitamins 10 ml/Chromium/ 

Copper/Manganese/ Seleni/Zn 1 ml/ Insulin Human Regular 20 unit/ Total 

Parenteral Nutrition/Amino Acids/Dextrose/ Fat Emulsion Intravenous 1,920 ml @  

80 mls/hr TPN  CONT IV  Last administered on 6/5/20at 21:59;  Start 6/5/20 at 

22:00;  Stop 6/6/20 at 21:59;  Status DC


Potassium Acetate 60 meq/Magnesium Sulfate 5 meq/ Multivitamins 10 ml/Chromium/ 

Copper/Manganese/ Seleni/Zn 1 ml/ Insulin Human Regular 30 unit/ Total Pare

nteral Nutrition/Amino Acids/Dextrose/ Fat Emulsion Intravenous 1,920 ml @  80 

mls/hr TPN  CONT IV  Last administered on 6/6/20at 21:54;  Start 6/6/20 at 

22:00;  Stop 6/7/20 at 21:59;  Status DC


Norepinephrine Bitartrate 8 mg/ Dextrose 258 ml @  13.332 mls/ hr CONT  PRN IV 

PER PROTOCOL Last administered on 7/2/20at 09:09;  Start 6/7/20 at 06:30;  Stop 

8/3/20 at 09:45;  Status DC


Albumin Human 500 ml @  125 mls/hr 1X  ONCE IV  Last administered on 6/7/20at 

08:10;  Start 6/7/20 at 08:15;  Stop 6/7/20 at 12:14;  Status DC


Potassium Acetate 40 meq/Magnesium Sulfate 5 meq/ Multivitamins 10 ml/Chromium/ 

Copper/Manganese/ Seleni/Zn 1 ml/ Insulin Human Regular 30 unit/ Total 

Parenteral Nutrition/Amino Acids/Dextrose/ Fat Emulsion Intravenous 1,920 ml @  

80 mls/hr TPN  CONT IV  Last administered on 6/7/20at 22:23;  Start 6/7/20 at 

22:00;  Stop 6/8/20 at 21:59;  Status DC


Meropenem 1 gm/ Sodium Chloride 100 ml @  200 mls/hr Q8HRS IV ;  Start 6/7/20 at

14:00;  Status Cancel


Meropenem 1 gm/ Sodium Chloride 100 ml @  200 mls/hr Q8HRS IV  Last administered

on 6/7/20at 11:04;  Start 6/7/20 at 10:00;  Stop 6/7/20 at 13:00;  Status DC


Meropenem 1 gm/ Sodium Chloride 100 ml @  200 mls/hr Q12HR IV  Last administered

on 6/25/20at 08:27;  Start 6/7/20 at 21:00;  Stop 6/25/20 at 08:56;  Status DC


Sodium Chloride 1,000 ml @  1,000 mls/hr 1X  ONCE IV  Last administered on 

6/7/20at 11:06;  Start 6/7/20 at 10:45;  Stop 6/7/20 at 11:44;  Status DC


Micafungin Sodium 100 mg/Dextrose 100 ml @  100 mls/hr Q24H IV  Last 

administered on 6/24/20at 12:34;  Start 6/7/20 at 11:00;  Stop 6/25/20 at 08:56;

 Status DC


Daptomycin 410 mg/ Sodium Chloride 50 ml @  100 mls/hr Q24H IV  Last 

administered on 6/9/20at 13:33;  Start 6/7/20 at 14:00;  Stop 6/10/20 at 08:30; 

Status DC


Midazolam HCl (Versed) 2 mg STK-MED ONCE .ROUTE ;  Start 6/7/20 at 14:47;  Stop 

6/7/20 at 14:48;  Status DC


Fentanyl Citrate (Fentanyl 2ml Vial) 100 mcg STK-MED ONCE .ROUTE ;  Start 6/7/20

at 14:47;  Stop 6/7/20 at 14:48;  Status DC


Flumazenil (Romazicon) 0.5 mg STK-MED ONCE IV ;  Start 6/7/20 at 14:48;  Stop 

6/7/20 at 14:48;  Status DC


Naloxone HCl (Narcan) 0.4 mg STK-MED ONCE .ROUTE ;  Start 6/7/20 at 14:48;  Stop

6/7/20 at 14:48;  Status DC


Lidocaine HCl (Lidocaine 1% 20ml Vial) 20 ml STK-MED ONCE .ROUTE ;  Start 6/7/20

at 14:48;  Stop 6/7/20 at 14:48;  Status DC


Midazolam HCl (Versed) 2 mg 1X  ONCE IV  Last administered on 6/7/20at 15:28;  

Start 6/7/20 at 15:00;  Stop 6/7/20 at 15:01;  Status DC


Fentanyl Citrate (Fentanyl 2ml Vial) 100 mcg 1X  ONCE IV  Last administered on 

6/7/20at 15:28;  Start 6/7/20 at 15:00;  Stop 6/7/20 at 15:01;  Status DC


Lidocaine HCl (Lidocaine 1% 20ml Vial) 20 ml 1X  ONCE INJ  Last administered on 

6/7/20at 15:30;  Start 6/7/20 at 15:00;  Stop 6/7/20 at 15:01;  Status DC


Sodium Chloride 1,000 ml @  100 mls/hr Q10H IV  Last administered on 6/16/20at 

07:30;  Start 6/7/20 at 20:00;  Stop 6/16/20 at 11:26;  Status DC


Sodium Bicarbonate (Sodium Bicarb Adult 8.4% Syr) 50 meq 1X  ONCE IV  Last 

administered on 6/7/20at 21:47;  Start 6/7/20 at 22:00;  Stop 6/7/20 at 22:01;  

Status DC


Potassium Acetate 40 meq/Magnesium Sulfate 5 meq/ Multivitamins 10 ml/Chromium/ 

Copper/Manganese/ Seleni/Zn 1 ml/ Insulin Human Regular 30 unit/ Total 

Parenteral Nutrition/Amino Acids/Dextrose/ Fat Emulsion Intravenous 1,920 ml @  

80 mls/hr TPN  CONT IV  Last administered on 6/8/20at 22:28;  Start 6/8/20 at 

22:00;  Stop 6/9/20 at 21:59;  Status DC


Sodium Chloride 500 ml @  500 mls/hr 1X  ONCE IV  Last administered on 6/9/20at 

06:39;  Start 6/9/20 at 06:45;  Stop 6/9/20 at 07:44;  Status DC


Potassium Acetate 40 meq/Magnesium Sulfate 5 meq/ Multivitamins 10 ml/Chromium/ 

Copper/Manganese/ Seleni/Zn 1 ml/ Insulin Human Regular 30 unit/ Total 

Parenteral Nutrition/Amino Acids/Dextrose/ Fat Emulsion Intravenous 1,920 ml @  

80 mls/hr TPN  CONT IV  Last administered on 6/9/20at 22:03;  Start 6/9/20 at 

22:00;  Stop 6/10/20 at 21:59;  Status DC


Metoprolol Tartrate (Lopressor Vial) 5 mg PRN Q6HRS  PRN IVP HYPERTENSION Last 

administered on 8/11/20at 10:02;  Start 6/10/20 at 09:00


Potassium Acetate 40 meq/Magnesium Sulfate 5 meq/ Multivitamins 10 ml/Chromium/ 

Copper/Manganese/ Seleni/Zn 1 ml/ Insulin Human Regular 30 unit/ Total 

Parenteral Nutrition/Amino Acids/Dextrose/ Fat Emulsion Intravenous 1,920 ml @  

80 mls/hr TPN  CONT IV  Last administered on 6/10/20at 21:26;  Start 6/10/20 at 

22:00;  Stop 6/11/20 at 21:59;  Status DC


Potassium Acetate 40 meq/Magnesium Sulfate 5 meq/ Multivitamins 10 ml/Chromium/ 

Copper/Manganese/ Seleni/Zn 1 ml/ Insulin Human Regular 30 unit/ Total 

Parenteral Nutrition/Amino Acids/Dextrose/ Fat Emulsion Intravenous 1,920 ml @  

80 mls/hr TPN  CONT IV  Last administered on 6/11/20at 23:23;  Start 6/11/20 at 

22:00;  Stop 6/12/20 at 21:59;  Status DC


Potassium Acetate 40 meq/Magnesium Sulfate 5 meq/ Multivitamins 10 ml/Chromium/ 

Copper/Manganese/ Seleni/Zn 1 ml/ Insulin Human Regular 30 unit/ Total 

Parenteral Nutrition/Amino Acids/Dextrose/ Fat Emulsion Intravenous 1,920 ml @  

80 mls/hr TPN  CONT IV  Last administered on 6/12/20at 21:35;  Start 6/12/20 at 

22:00;  Stop 6/13/20 at 21:59;  Status DC


Furosemide (Lasix) 20 mg 1X  ONCE IVP  Last administered on 6/13/20at 06:26;  

Start 6/13/20 at 06:15;  Stop 6/13/20 at 06:16;  Status DC


Methylprednisolone Sodium Succinate (SOLU-Medrol 125MG VIAL) 125 mg 1X  ONCE IV 

Last administered on 6/13/20at 06:26;  Start 6/13/20 at 06:15;  Stop 6/13/20 at 

06:16;  Status DC


Albuterol/ Ipratropium (Duoneb) 3 ml Q4HRS NEB  Last administered on 8/15/20at 

11:43;  Start 6/13/20 at 08:00


Fentanyl Citrate 30 ml @ 0 mls/hr CONT  PRN IV SEE PROTOCOL Last administered on

7/4/20at 08:03;  Start 6/13/20 at 06:00;  Stop 7/4/20 at 12:42;  Status DC


Propofol 100 ml @ 0 mls/hr CONT  PRN IV SEE PROTOCOL Last administered on 

6/20/20at 23:50;  Start 6/13/20 at 06:00;  Stop 8/3/20 at 09:45;  Status DC


Fentanyl Citrate (Fentanyl 2ml Vial) 25 mcg PRN Q1HR  PRN IV SEE COMMENTS Last 

administered on 8/1/20at 23:56;  Start 6/13/20 at 06:00;  Stop 8/3/20 at 09:45; 

Status DC


Fentanyl Citrate (Fentanyl 2ml Vial) 50 mcg PRN Q1HR  PRN IV SEE COMMENTS Last 

administered on 8/3/20at 09:39;  Start 6/13/20 at 06:00;  Stop 8/3/20 at 09:45; 

Status DC


Chlorhexidine Gluconate (Peridex) 15 ml BID MM ;  Start 6/13/20 at 09:00;  Stop 

6/13/20 at 07:58;  Status DC


Potassium Acetate 40 meq/Magnesium Sulfate 5 meq/ Multivitamins 10 ml/Chromium/ 

Copper/Manganese/ Seleni/Zn 1 ml/ Insulin Human Regular 30 unit/ Total 

Parenteral Nutrition/Amino Acids/Dextrose/ Fat Emulsion Intravenous 1,920 ml @  

80 mls/hr TPN  CONT IV  Last administered on 6/13/20at 21:19;  Start 6/13/20 at 

22:00;  Stop 6/14/20 at 21:59;  Status DC


Acetylcysteine (Mucomyst 20% Resp Treatment) 600 mg BID NEB  Last administered 

on 6/19/20at 09:33;  Start 6/13/20 at 21:00;  Stop 6/19/20 at 10:39;  Status DC


Magnesium Sulfate 100 ml @  25 mls/hr 1X  ONCE IV  Last administered on 

6/13/20at 15:48;  Start 6/13/20 at 15:45;  Stop 6/13/20 at 19:44;  Status DC


Potassium Acetate 40 meq/Magnesium Sulfate 5 meq/ Multivitamins 10 ml/Chromium/ 

Copper/Manganese/ Seleni/Zn 1 ml/ Insulin Human Regular 30 unit/ Total 

Parenteral Nutrition/Amino Acids/Dextrose/ Fat Emulsion Intravenous 1,920 ml @  

80 mls/hr TPN  CONT IV  Last administered on 6/14/20at 21:35;  Start 6/14/20 at 

22:00;  Stop 6/15/20 at 21:59;  Status DC


Potassium Chloride/Water 100 ml @  100 mls/hr Q1H IV  Last administered on 

6/15/20at 08:31;  Start 6/15/20 at 07:00;  Stop 6/15/20 at 08:59;  Status DC


Potassium Acetate 40 meq/Magnesium Sulfate 5 meq/ Multivitamins 10 ml/Chromium/ 

Copper/Manganese/ Seleni/Zn 1 ml/ Insulin Human Regular 30 unit/ Total 

Parenteral Nutrition/Amino Acids/Dextrose/ Fat Emulsion Intravenous 1,920 ml @  

80 mls/hr TPN  CONT IV  Last administered on 6/15/20at 21:54;  Start 6/15/20 at 

22:00;  Stop 6/16/20 at 19:34;  Status DC


Lidocaine HCl (Buffered Lidocaine 1%) 3 ml STK-MED ONCE .ROUTE ;  Start 6/15/20 

at 12:14;  Stop 6/15/20 at 12:14;  Status DC


Lidocaine HCl (Buffered Lidocaine 1%) 3 ml 1X  ONCE IJ  Last administered on 

6/15/20at 13:11;  Start 6/15/20 at 13:00;  Stop 6/15/20 at 13:01;  Status DC


Magnesium Sulfate 50 ml @ 25 mls/hr 1X  ONCE IV ;  Start 6/16/20 at 08:15;  Stop

6/16/20 at 10:14;  Status DC


Potassium Acetate 40 meq/Magnesium Sulfate 10 meq/ Multivitamins 10 ml/Chromium/

Copper/Manganese/ Seleni/Zn 1 ml/ Insulin Human Regular 20 unit/ Total 

Parenteral Nutrition/Amino Acids/Dextrose/ Fat Emulsion Intravenous 1,920 ml @  

80 mls/hr TPN  CONT IV  Last administered on 6/16/20at 21:32;  Start 6/16/20 at 

22:00;  Stop 6/17/20 at 21:59;  Status DC


Potassium Chloride/Water 100 ml @  100 mls/hr Q1H IV  Last administered on 

6/17/20at 09:12;  Start 6/17/20 at 08:00;  Stop 6/17/20 at 09:59;  Status DC


Alteplase, Recombinant (Cathflo For Central Catheter Clearance) 4 mg 1X  ONCE 

INT CAT ;  Start 6/17/20 at 09:15;  Stop 6/17/20 at 09:16;  Status UNV


Alteplase, Recombinant (Cathflo For Central Catheter Clearance) 4 mg 1X  ONCE 

INT CAT ;  Start 6/17/20 at 09:15;  Stop 6/17/20 at 09:16;  Status UNV


Alteplase, Recombinant (Cathflo For Central Catheter Clearance) 4 mg 1X  ONCE 

INT CAT ;  Start 6/17/20 at 09:15;  Stop 6/17/20 at 09:16;  Status UNV


Alteplase, Recombinant 4 mg/ Sodium Chloride 20 ml @ 20 mls/hr 1X  ONCE IV  Last

administered on 6/17/20at 10:10;  Start 6/17/20 at 10:00;  Stop 6/17/20 at 

10:59;  Status DC


Alteplase, Recombinant 4 mg/ Sodium Chloride 20 ml @ 20 mls/hr 1X  ONCE IV  Last

administered on 6/17/20at 10:09;  Start 6/17/20 at 10:00;  Stop 6/17/20 at 

10:59;  Status DC


Alteplase, Recombinant 4 mg/ Sodium Chloride 20 ml @ 20 mls/hr 1X  ONCE IV  Last

administered on 6/17/20at 10:09;  Start 6/17/20 at 10:00;  Stop 6/17/20 at 

10:59;  Status DC


Potassium Acetate 60 meq/Magnesium Sulfate 10 meq/ Multivitamins 10 ml/Chromium/

Copper/Manganese/ Seleni/Zn 1 ml/ Insulin Human Regular 20 unit/ Total Lisa

ral Nutrition/Amino Acids/Dextrose/ Fat Emulsion Intravenous 1,920 ml @  80 

mls/hr TPN  CONT IV  Last administered on 6/17/20at 21:55;  Start 6/17/20 at 

22:00;  Stop 6/18/20 at 21:59;  Status DC


Albumin Human 500 ml @  125 mls/hr 1X  ONCE IV  Last administered on 6/18/20at 

12:01;  Start 6/18/20 at 11:15;  Stop 6/18/20 at 15:14;  Status DC


Sodium Chloride 500 ml @  500 mls/hr 1X  ONCE IV  Last administered on 6/18/20at

13:50;  Start 6/18/20 at 11:15;  Stop 6/18/20 at 12:14;  Status DC


Potassium Acetate 60 meq/Magnesium Sulfate 14 meq/ Multivitamins 10 ml/Chromium/

Copper/Manganese/ Seleni/Zn 1 ml/ Insulin Human Regular 20 unit/ Total 

Parenteral Nutrition/Amino Acids/Dextrose/ Fat Emulsion Intravenous 1,920 ml @  

80 mls/hr TPN  CONT IV  Last administered on 6/18/20at 22:26;  Start 6/18/20 at 

22:00;  Stop 6/19/20 at 21:59;  Status DC


Ciprofloxacin/ Dextrose 200 ml @  200 mls/hr Q12HR IV  Last administered on 

6/25/20at 08:27;  Start 6/18/20 at 21:00;  Stop 6/25/20 at 08:56;  Status DC


Albumin Human 250 ml @  62.5 mls/hr 1X  ONCE IV  Last administered on 6/19/20at 

11:09;  Start 6/19/20 at 11:00;  Stop 6/19/20 at 14:59;  Status DC


Furosemide (Lasix) 20 mg 1X  ONCE IVP  Last administered on 6/19/20at 14:52;  

Start 6/19/20 at 10:45;  Stop 6/19/20 at 10:49;  Status DC


Potassium Acetate 60 meq/Magnesium Sulfate 14 meq/ Multivitamins 10 ml/Chromium/

Copper/Manganese/ Seleni/Zn 1 ml/ Insulin Human Regular 15 unit/ Total 

Parenteral Nutrition/Amino Acids/Dextrose/ Fat Emulsion Intravenous 1,920 ml @  

80 mls/hr TPN  CONT IV  Last administered on 6/19/20at 22:08;  Start 6/19/20 at 

22:00;  Stop 6/20/20 at 21:59;  Status DC


Potassium Acetate 60 meq/Magnesium Sulfate 14 meq/ Multivitamins 10 ml/Chromium/

Copper/Manganese/ Seleni/Zn 1 ml/ Insulin Human Regular 15 unit/ Total Parent

eral Nutrition/Amino Acids/Dextrose/ Fat Emulsion Intravenous 1,920 ml @  80 

mls/hr TPN  CONT IV  Last administered on 6/20/20at 22:12;  Start 6/20/20 at 

22:00;  Stop 6/21/20 at 21:59;  Status DC


Potassium Acetate 60 meq/Magnesium Sulfate 14 meq/ Multivitamins 10 ml/Chromium/

Copper/Manganese/ Seleni/Zn 1 ml/ Insulin Human Regular 15 unit/ Total 

Parenteral Nutrition/Amino Acids/Dextrose/ Fat Emulsion Intravenous 1,920 ml @  

80 mls/hr TPN  CONT IV  Last administered on 6/21/20at 22:22;  Start 6/21/20 at 

22:00;  Stop 6/22/20 at 21:59;  Status DC


Furosemide (Lasix) 20 mg 1X  ONCE IVP  Last administered on 6/22/20at 11:07;  

Start 6/22/20 at 10:30;  Stop 6/22/20 at 10:34;  Status DC


Potassium Acetate 60 meq/Magnesium Sulfate 14 meq/ Multivitamins 10 ml/Chromium/

Copper/Manganese/ Seleni/Zn 1 ml/ Insulin Human Regular 15 unit/ Sodium Chloride

20 meq/Total Parenteral Nutrition/Amino Acids/Dextrose/ Fat Emulsion Intravenous

1,920 ml @  80 mls/hr TPN  CONT IV  Last administered on 6/22/20at 21:54;  Start

6/22/20 at 22:00;  Stop 6/23/20 at 21:59;  Status DC


Potassium Acetate 30 meq/Magnesium Sulfate 14 meq/ Multivitamins 10 ml/Chromium/

Copper/Manganese/ Seleni/Zn 1 ml/ Insulin Human Regular 15 unit/ Sodium Chloride

20 meq/Potassium Chloride 30 meq/ Total Parenteral Nutrition/Amino 

Acids/Dextrose/ Fat Emulsion Intravenous 1,920 ml @  80 mls/hr TPN  CONT IV  

Last administered on 6/23/20at 21:46;  Start 6/23/20 at 22:00;  Stop 6/24/20 at 

21:59;  Status DC


Sodium Chloride 80 meq/Potassium Chloride 30 meq/ Potassium Acetate 30 

meq/Magnesium Sulfate 14 meq/ Multivitamins 10 ml/Chromium/ Copper/Manganese/ 

Seleni/Zn 1 ml/ Insulin Human Regular 15 unit/ Total Parenteral Nutrition/Amino 

Acids/Dextrose/ Fat Emulsion Intravenous 1,920 ml @  80 mls/hr TPN  CONT IV  

Last administered on 6/24/20at 22:33;  Start 6/24/20 at 22:00;  Stop 6/25/20 at 

21:59;  Status DC


Furosemide (Lasix) 40 mg 1X  ONCE IVP  Last administered on 6/24/20at 16:27;  

Start 6/24/20 at 15:30;  Stop 6/24/20 at 15:33;  Status DC


Albumin Human 250 ml @  62.5 mls/hr 1X  ONCE IV  Last administered on 6/24/20at 

16:27;  Start 6/24/20 at 15:30;  Stop 6/24/20 at 19:29;  Status DC


Sodium Chloride 80 meq/Potassium Chloride 30 meq/ Potassium Acetate 30 

meq/Magnesium Sulfate 14 meq/ Multivitamins 10 ml/Chromium/ Copper/Manganese/ 

Seleni/Zn 1 ml/ Insulin Human Regular 15 unit/ Total Parenteral Nutrition/Amino 

Acids/Dextrose/ Fat Emulsion Intravenous 1,920 ml @  80 mls/hr TPN  CONT IV  

Last administered on 6/25/20at 22:25;  Start 6/25/20 at 22:00;  Stop 6/26/20 at 

21:59;  Status DC


Sodium Chloride 80 meq/Potassium Chloride 30 meq/ Potassium Acetate 30 

meq/Magnesium Sulfate 14 meq/ Multivitamins 10 ml/Chromium/ Copper/Manganese/ 

Seleni/Zn 1 ml/ Insulin Human Regular 15 unit/ Total Parenteral Nutrition/Amino 

Acids/Dextrose/ Fat Emulsion Intravenous 1,920 ml @  80 mls/hr TPN  CONT IV  

Last administered on 6/26/20at 21:32;  Start 6/26/20 at 22:00;  Stop 6/27/20 at 

21:59;  Status DC


Sodium Chloride 80 meq/Potassium Chloride 30 meq/ Potassium Acetate 30 

meq/Magnesium Sulfate 14 meq/ Multivitamins 10 ml/Chromium/ Copper/Manganese/ 

Seleni/Zn 1 ml/ Insulin Human Regular 15 unit/ Total Parenteral Nutrition/Amino 

Acids/Dextrose/ Fat Emulsion Intravenous 1,920 ml @  80 mls/hr TPN  CONT IV  

Last administered on 6/27/20at 21:53;  Start 6/27/20 at 22:00;  Stop 6/28/20 at 

21:59;  Status DC


Acetylcysteine (Mucomyst 20% Resp Treatment) 600 mg RTBID NEB  Last administered

on 8/15/20at 07:54;  Start 6/27/20 at 12:00


Sodium Chloride 80 meq/Potassium Chloride 30 meq/ Potassium Acetate 30 

meq/Magnesium Sulfate 14 meq/ Multivitamins 10 ml/Chromium/ Copper/Manganese/ 

Seleni/Zn 1 ml/ Insulin Human Regular 15 unit/ Total Parenteral Nutrition/Amino 

Acids/Dextrose/ Fat Emulsion Intravenous 1,920 ml @  80 mls/hr TPN  CONT IV  

Last administered on 6/28/20at 22:06;  Start 6/28/20 at 22:00;  Stop 6/29/20 at 

21:59;  Status DC


Meropenem 500 mg/ Sodium Chloride 50 ml @  100 mls/hr Q6HRS IV  Last ad

ministered on 7/21/20at 06:15;  Start 6/28/20 at 18:00;  Stop 7/21/20 at 08:23; 

Status DC


Daptomycin 500 mg/ Sodium Chloride 50 ml @  100 mls/hr Q24H IV  Last 

administered on 7/6/20at 21:47;  Start 6/28/20 at 19:00;  Stop 7/7/20 at 08:13; 

Status DC


Sodium Chloride 80 meq/Potassium Chloride 30 meq/ Potassium Acetate 30 

meq/Magnesium Sulfate 14 meq/ Multivitamins 10 ml/Chromium/ Copper/Manganese/ 

Seleni/Zn 1 ml/ Insulin Human Regular 15 unit/ Total Parenteral Nutrition/Amino 

Acids/Dextrose/ Fat Emulsion Intravenous 1,920 ml @  80 mls/hr TPN  CONT IV  

Last administered on 6/29/20at 22:09;  Start 6/29/20 at 22:00;  Stop 6/30/20 at 

21:59;  Status DC


Heparin Sodium (Porcine) 1000 unit/Sodium Chloride 1,001 ml @  1,001 mls/hr 1X  

ONCE IRR ;  Start 6/30/20 at 06:00;  Stop 6/30/20 at 06:59;  Status DC


Propofol (Diprivan) 200 mg STK-MED ONCE IV ;  Start 6/30/20 at 07:44;  Stop 

6/30/20 at 07:44;  Status DC


Lidocaine HCl (Lidocaine Pf 2% Vial) 5 ml STK-MED ONCE .ROUTE ;  Start 6/30/20 

at 07:44;  Stop 6/30/20 at 07:44;  Status DC


Fentanyl Citrate (Fentanyl 2ml Vial) 100 mcg STK-MED ONCE .ROUTE ;  Start 

6/30/20 at 07:44;  Stop 6/30/20 at 07:44;  Status DC


Rocuronium Bromide (Zemuron) 100 mg STK-MED ONCE .ROUTE ;  Start 6/30/20 at 

07:44;  Stop 6/30/20 at 07:44;  Status DC


Micafungin Sodium 100 mg/Dextrose 100 ml @  100 mls/hr Q24H IV  Last 

administered on 8/4/20at 09:30;  Start 6/30/20 at 08:30;  Stop 8/5/20 at 08:20; 

Status DC


Bupivacaine HCl/ Epinephrine Bitart (Sensorcain-Epi 0.5%-1:[id number] Mpf) 30 ml 

STK-MED ONCE .ROUTE ;  Start 6/30/20 at 08:34;  Stop 6/30/20 at 08:35;  Status 

DC


Iohexol (Omnipaque 300 Mg/ml) 50 ml STK-MED ONCE .ROUTE  Last administered on 

6/30/20at 13:30;  Start 6/30/20 at 08:35;  Stop 6/30/20 at 08:35;  Status DC


Sodium Chloride 80 meq/Potassium Chloride 30 meq/ Potassium Acetate 30 

meq/Magnesium Sulfate 14 meq/ Multivitamins 10 ml/Chromium/ Copper/Manganese/ 

Seleni/Zn 1 ml/ Insulin Human Regular 15 unit/ Total Parenteral Nutrition/Amino 

Acids/Dextrose/ Fat Emulsion Intravenous 1,920 ml @  80 mls/hr TPN  CONT IV  

Last administered on 7/1/20at 01:22;  Start 6/30/20 at 22:00;  Stop 7/1/20 at 

21:59;  Status DC


Phenylephrine HCl (Ken-Synephrine Inj) 10 mg STK-MED ONCE .ROUTE ;  Start 

6/30/20 at 10:15;  Stop 6/30/20 at 10:15;  Status DC


Desflurane (Suprane) 90 ml STK-MED ONCE IH ;  Start 6/30/20 at 10:18;  Stop 

6/30/20 at 10:19;  Status DC


Albumin Human 500 ml @  As Directed STK-MED ONCE IV ;  Start 6/30/20 at 11:06;  

Stop 6/30/20 at 11:06;  Status DC


Vasopressin (Vasostrict) 20 unit STK-MED ONCE .ROUTE ;  Start 6/30/20 at 12:23; 

Stop 6/30/20 at 12:23;  Status DC


Phenylephrine HCl (Ken-Synephrine Inj) 10 mg STK-MED ONCE .ROUTE ;  Start 

6/30/20 at 13:33;  Stop 6/30/20 at 13:33;  Status DC


Phenylephrine HCl (Ken-Synephrine Inj) 10 mg STK-MED ONCE .ROUTE ;  Start 

6/30/20 at 13:33;  Stop 6/30/20 at 13:33;  Status DC


Ondansetron HCl (Zofran) 4 mg STK-MED ONCE .ROUTE ;  Start 6/30/20 at 13:33;  

Stop 6/30/20 at 13:33;  Status DC


Enoxaparin Sodium (Lovenox 40mg Syringe) 40 mg Q24H SQ  Last administered on 

8/15/20at 08:54;  Start 7/1/20 at 08:00


Sodium Chloride (Normal Saline Flush) 3 ml QSHIFT  PRN IV AFTER MEDS AND BLOOD 

DRAWS;  Start 6/30/20 at 14:45;  Stop 8/3/20 at 09:45;  Status DC


Naloxone HCl (Narcan) 0.4 mg PRN Q2MIN  PRN IV SEE INSTRUCTIONS;  Start 6/30/20 

at 14:45;  Stop 8/3/20 at 09:45;  Status DC


Sodium Chloride 1,000 ml @  25 mls/hr Q24H IV  Last administered on 8/2/20at 

20:55;  Start 6/30/20 at 14:33;  Stop 8/3/20 at 09:45;  Status DC


Morphine Sulfate (Morphine Sulfate) 1 mg PRN Q1HR  PRN IV PAIN Last administered

on 7/25/20at 18:28;  Start 6/30/20 at 14:45;  Stop 8/3/20 at 09:45;  Status DC


Midazolam HCl 100 mg/Sodium Chloride 100 ml @ 1 mls/hr CONT  PRN IV SEE I/O 

RECORD Last administered on 7/3/20at 18:48;  Start 6/30/20 at 14:45;  Stop 

8/3/20 at 09:45;  Status DC


Phenylephrine HCl (PHENYLEPHRINE in 0.9% NACL PF) 1 mg STK-MED ONCE IV ;  Start 

6/30/20 at 14:44;  Stop 6/30/20 at 14:45;  Status DC


Ephedrine Sulfate (ePHEDrine PF IN SALINE SYRINGE) 50 mg STK-MED ONCE IV ;  

Start 6/30/20 at 14:45;  Stop 6/30/20 at 14:45;  Status DC


Vasopressin 20 unit/Dextrose 101 ml @  12 mls/hr CONT  PRN IV SEE I/O RECORD 

Last administered on 7/7/20at 04:17;  Start 6/30/20 at 15:30;  Stop 8/3/20 at 

09:45;  Status DC


Sodium Chloride 1,000 ml @  1,000 mls/hr 1X  ONCE IV  Last administered on 

6/30/20at 15:42;  Start 6/30/20 at 15:45;  Stop 6/30/20 at 16:44;  Status DC


Albumin Human 500 ml @  125 mls/hr 1X  ONCE IV ;  Start 6/30/20 at 16:00;  Stop 

6/30/20 at 19:59;  Status DC


Albumin Human 500 ml @  125 mls/hr PRN Q1HR  PRN IV PER PROTOCOL;  Start 6/30/20

at 15:45;  Stop 8/3/20 at 09:52;  Status DC


Magnesium Sulfate 50 ml @ 25 mls/hr 1X  ONCE IV  Last administered on 6/30/20at 

17:02;  Start 6/30/20 at 16:30;  Stop 6/30/20 at 18:29;  Status DC


Sodium Bicarbonate (Sodium Bicarb Adult 8.4% Syr) 50 meq STK-MED ONCE .ROUTE ;  

Start 6/30/20 at 16:20;  Stop 6/30/20 at 16:20;  Status DC


Sodium Bicarbonate (Sodium Bicarb Adult 8.4% Syr) 100 meq 1X  ONCE IV  Last 

administered on 6/30/20at 17:07;  Start 6/30/20 at 16:30;  Stop 6/30/20 at 16:31

;  Status DC


Sodium Bicarbonate 150 meq/Dextrose 1,150 ml @  75 mls/hr 1X  ONCE IV  Last 

administered on 6/30/20at 20:02;  Start 6/30/20 at 16:30;  Stop 7/1/20 at 07:49;

 Status DC


Sodium Chloride 80 meq/Potassium Chloride 30 meq/ Potassium Acetate 30 meq/

Magnesium Sulfate 14 meq/ Multivitamins 10 ml/Chromium/ Copper/Manganese/ 

Seleni/Zn 1 ml/ Insulin Human Regular 15 unit/ Total Parenteral Nutrition/Amino 

Acids/Dextrose/ Fat Emulsion Intravenous 1,920 ml @  80 mls/hr TPN  CONT IV  

Last administered on 7/1/20at 23:05;  Start 7/1/20 at 22:00;  Stop 7/2/20 at 

21:59;  Status DC


Sodium Chloride 100 meq/Potassium Chloride 30 meq/ Potassium Acetate 30 

meq/Magnesium Sulfate 12 meq/ Multivitamins 10 ml/Chromium/ Copper/Manganese/ 

Seleni/Zn 1 ml/ Insulin Human Regular 15 unit/ Total Parenteral Nutrition/Amino 

Acids/Dextrose/ Fat Emulsion Intravenous 1,920 ml @  80 mls/hr TPN  CONT IV  

Last administered on 7/2/20at 21:52;  Start 7/2/20 at 22:00;  Stop 7/3/20 at 

21:59;  Status DC


Sodium Chloride 100 meq/Potassium Chloride 30 meq/ Potassium Acetate 30 

meq/Magnesium Sulfate 12 meq/ Multivitamins 10 ml/Chromium/ Copper/Manganese/ 

Seleni/Zn 1 ml/ Insulin Human Regular 15 unit/ Total Parenteral Nutrition/Amino 

Acids/Dextrose/ Fat Emulsion Intravenous 1,920 ml @  80 mls/hr TPN  CONT IV  

Last administered on 7/3/20at 21:46;  Start 7/3/20 at 22:00;  Stop 7/4/20 at 

21:59;  Status DC


Sodium Chloride 100 meq/Potassium Chloride 30 meq/ Potassium Acetate 30 

meq/Magnesium Sulfate 12 meq/ Multivitamins 10 ml/Chromium/ Copper/Manganese/ 

Seleni/Zn 1 ml/ Insulin Human Regular 15 unit/ Total Parenteral Nutrition/Amino 

Acids/Dextrose/ Fat Emulsion Intravenous 1,800 ml @  75 mls/hr TPN  CONT IV  

Last administered on 7/4/20at 22:04;  Start 7/4/20 at 22:00;  Stop 7/5/20 at 

21:59;  Status DC


Fentanyl Citrate 55 ml @ 0 mls/hr CONT  PRN IV SEE COMMENTS Last administered on

7/6/20at 23:55;  Start 7/4/20 at 13:00;  Stop 7/9/20 at 17:28;  Status DC


Sodium Chloride 100 meq/Potassium Chloride 30 meq/ Potassium Acetate 30 

meq/Magnesium Sulfate 12 meq/ Multivitamins 10 ml/Chromium/ Copper/Manganese/ 

Seleni/Zn 1 ml/ Insulin Human Regular 15 unit/ Total Parenteral Nutrition/Amino 

Acids/Dextrose/ Fat Emulsion Intravenous 1,680 ml @  70 mls/hr TPN  CONT IV  

Last administered on 7/5/20at 21:23;  Start 7/5/20 at 22:00;  Stop 7/6/20 at 

21:59;  Status DC


Sodium Chloride 110 meq/Potassium Chloride 30 meq/ Potassium Acetate 30 

meq/Magnesium Sulfate 15 meq/ Multivitamins 10 ml/Chromium/ Copper/Manganese/ 

Seleni/Zn 1 ml/ Insulin Human Regular 15 unit/ Total Parenteral Nutrition/Amino 

Acids/Dextrose/ Fat Emulsion Intravenous 1,680 ml @  70 mls/hr TPN  CONT IV  

Last administered on 7/6/20at 21:48;  Start 7/6/20 at 22:00;  Stop 7/7/20 at 

21:59;  Status DC


Sodium Chloride 110 meq/Potassium Chloride 30 meq/ Potassium Acetate 30 

meq/Magnesium Sulfate 15 meq/ Multivitamins 10 ml/Chromium/ Copper/Manganese/ 

Seleni/Zn 1 ml/ Insulin Human Regular 15 unit/ Total Parenteral Nutrition/Amino 

Acids/Dextrose/ Fat Emulsion Intravenous 1,680 ml @  70 mls/hr TPN  CONT IV  

Last administered on 7/7/20at 21:33;  Start 7/7/20 at 22:00;  Stop 7/8/20 at 

21:59;  Status DC


Sodium Chloride 110 meq/Potassium Chloride 30 meq/ Potassium Acetate 30 

meq/Magnesium Sulfate 15 meq/ Multivitamins 10 ml/Chromium/ Copper/Manganese/ 

Seleni/Zn 1 ml/ Insulin Human Regular 15 unit/ Total Parenteral Nutrition/Amino 

Acids/Dextrose/ Fat Emulsion Intravenous 1,680 ml @  70 mls/hr TPN  CONT IV  

Last administered on 7/8/20at 21:51;  Start 7/8/20 at 22:00;  Stop 7/9/20 at 

21:59;  Status DC


Sodium Chloride 90 meq/Potassium Chloride 30 meq/ Potassium Acetate 30 

meq/Magnesium Sulfate 15 meq/ Multivitamins 10 ml/Chromium/ Copper/Manganese/ 

Seleni/Zn 1 ml/ Insulin Human Regular 15 unit/ Total Parenteral Nutrition/Amino 

Acids/Dextrose/ Fat Emulsion Intravenous 1,680 ml @  70 mls/hr TPN  CONT IV  

Last administered on 7/9/20at 22:38;  Start 7/9/20 at 22:00;  Stop 7/10/20 at 

21:59;  Status DC


Fentanyl Citrate 30 ml @ 0 mls/hr CONT  PRN IV SEE I/O RECORD;  Start 7/9/20 at 

17:30;  Stop 8/3/20 at 09:45;  Status DC


Fentanyl (Duragesic 12mcg/ Hr Patch) 1 patch Q3DAYS TD  Last administered on 

8/15/20at 08:38;  Start 7/10/20 at 09:00


Sodium Chloride 90 meq/Potassium Chloride 30 meq/ Potassium Acetate 30 

meq/Magnesium Sulfate 15 meq/ Multivitamins 10 ml/Chromium/ Copper/Manganese/ 

Seleni/Zn 1 ml/ Insulin Human Regular 15 unit/ Total Parenteral Nutrition/Amino 

Acids/Dextrose/ Fat Emulsion Intravenous 1,680 ml @  70 mls/hr TPN  CONT IV  

Last administered on 7/10/20at 21:59;  Start 7/10/20 at 22:00;  Stop 7/11/20 at 

21:59;  Status DC


Sodium Chloride 90 meq/Potassium Chloride 30 meq/ Potassium Acetate 30 

meq/Magnesium Sulfate 15 meq/ Multivitamins 10 ml/Chromium/ Copper/Manganese/ 

Seleni/Zn 1 ml/ Insulin Human Regular 15 unit/ Total Parenteral Nutrition/Amino 

Acids/Dextrose/ Fat Emulsion Intravenous 1,680 ml @  70 mls/hr TPN  CONT IV  

Last administered on 7/11/20at 21:35;  Start 7/11/20 at 22:00;  Stop 7/12/20 at 

21:59;  Status DC


Vancomycin HCl (Vanco Per Pharmacy) 1 each PRN DAILY  PRN MC SEE COMMENTS Last 

administered on 7/14/20at 02:46;  Start 7/12/20 at 09:15;  Stop 7/15/20 at 

07:41;  Status DC


Ciprofloxacin/ Dextrose 200 ml @  200 mls/hr Q12HR IV  Last administered on 

7/20/20at 21:02;  Start 7/12/20 at 10:00;  Stop 7/21/20 at 08:20;  Status DC


Vancomycin HCl 2 gm/Sodium Chloride 500 ml @  250 mls/hr 1X  ONCE IV  Last 

administered on 7/12/20at 10:34;  Start 7/12/20 at 10:00;  Stop 7/12/20 at 

11:59;  Status DC


Sodium Chloride 90 meq/Potassium Chloride 30 meq/ Potassium Acetate 30 

meq/Magnesium Sulfate 15 meq/ Multivitamins 10 ml/Chromium/ Copper/Manganese/ 

Seleni/Zn 1 ml/ Insulin Human Regular 15 unit/ Total Parenteral Nutrition/Amino 

Acids/Dextrose/ Fat Emulsion Intravenous 1,680 ml @  70 mls/hr TPN  CONT IV  

Last administered on 7/12/20at 22:02;  Start 7/12/20 at 22:00;  Stop 7/13/20 at 

21:59;  Status DC


Diphenhydramine HCl (Benadryl) 25 mg 1X  ONCE IVP  Last administered on 

7/12/20at 14:26;  Start 7/12/20 at 14:30;  Stop 7/12/20 at 14:31;  Status DC


Vancomycin HCl 1.5 gm/Sodium Chloride 500 ml @  250 mls/hr Q8H IV  Last 

administered on 7/13/20at 03:08;  Start 7/12/20 at 18:30;  Stop 7/13/20 at 

12:24;  Status DC


Vancomycin HCl (Vancomycin Trough Level) 1 each 1X  ONCE MC  Last administered 

on 7/13/20at 10:00;  Start 7/13/20 at 10:00;  Stop 7/13/20 at 10:01;  Status DC


Sodium Chloride 90 meq/Potassium Chloride 30 meq/ Potassium Acetate 30 

meq/Magnesium Sulfate 15 meq/ Multivitamins 10 ml/Chromium/ Copper/Manganese/ 

Seleni/Zn 1 ml/ Insulin Human Regular 15 unit/ Total Parenteral Nutrition/Amino 

Acids/Dextrose/ Fat Emulsion Intravenous 1,680 ml @  70 mls/hr TPN  CONT IV  

Last administered on 7/13/20at 22:13;  Start 7/13/20 at 22:00;  Stop 7/14/20 at 

21:59;  Status DC


Vancomycin HCl (Vancomycin Random Level) 1 each 1X  ONCE MC  Last administered 

on 7/14/20at 01:00;  Start 7/14/20 at 01:00;  Stop 7/14/20 at 01:01;  Status DC


Vancomycin HCl 1.5 gm/Sodium Chloride 500 ml @  250 mls/hr Q12H IV  Last 

administered on 7/14/20at 22:07;  Start 7/14/20 at 10:00;  Stop 7/15/20 at 

07:41;  Status DC


Vancomycin HCl (Vancomycin Trough Level) 1 each 1X  ONCE MC ;  Start 7/15/20 at 

09:30;  Stop 7/15/20 at 09:31;  Status Cancel


Sodium Chloride 90 meq/Potassium Chloride 30 meq/ Potassium Acetate 30 

meq/Magnesium Sulfate 15 meq/ Multivitamins 10 ml/Chromium/ Copper/Manganese/ 

Seleni/Zn 1 ml/ Insulin Human Regular 15 unit/ Total Parenteral Nutrition/Amino 

Acids/Dextrose/ Fat Emulsion Intravenous 1,680 ml @  70 mls/hr TPN  CONT IV  

Last administered on 7/14/20at 22:08;  Start 7/14/20 at 22:00;  Stop 7/15/20 at 

21:59;  Status DC


Alteplase, Recombinant (Cathflo For Central Catheter Clearance) 1 mg 1X  ONCE 

INT CAT  Last administered on 7/14/20at 11:49;  Start 7/14/20 at 11:00;  Stop 

7/14/20 at 11:01;  Status DC


Daptomycin 500 mg/ Sodium Chloride 50 ml @  100 mls/hr Q24H IV  Last 

administered on 7/24/20at 08:25;  Start 7/15/20 at 09:00;  Stop 7/24/20 at 

08:38;  Status DC


Sodium Chloride 90 meq/Potassium Chloride 30 meq/ Potassium Acetate 30 

meq/Magnesium Sulfate 15 meq/ Multivitamins 10 ml/Chromium/ Copper/Manganese/ 

Seleni/Zn 1 ml/ Insulin Human Regular 15 unit/ Total Parenteral Nutrition/Amino 

Acids/Dextrose/ Fat Emulsion Intravenous 1,680 ml @  70 mls/hr TPN  CONT IV  

Last administered on 7/15/20at 22:55;  Start 7/15/20 at 22:00;  Stop 7/16/20 at 

21:59;  Status DC


Sodium Chloride 90 meq/Potassium Chloride 30 meq/ Potassium Acetate 30 

meq/Magnesium Sulfate 15 meq/ Multivitamins 10 ml/Chromium/ Copper/Manganese/ 

Seleni/Zn 1 ml/ Insulin Human Regular 15 unit/ Total Parenteral Nutrition/Amino 

Acids/Dextrose/ Fat Emulsion Intravenous 1,680 ml @  70 mls/hr TPN  CONT IV  

Last administered on 7/16/20at 22:06;  Start 7/16/20 at 22:00;  Stop 7/17/20 at 

21:59;  Status DC


Diphenhydramine HCl (Benadryl) 50 mg STK-MED ONCE .ROUTE ;  Start 7/16/20 at 

18:34;  Stop 7/16/20 at 18:35;  Status DC


Diphenhydramine HCl (Benadryl) 25 mg 1X  ONCE IM ;  Start 7/16/20 at 18:45;  

Stop 7/16/20 at 18:46;  Status DC


Diphenhydramine HCl (Benadryl) 25 mg 1X  ONCE IVP  Last administered on 7/ 16/20at 18:56;  Start 7/16/20 at 19:00;  Stop 7/16/20 at 19:01;  Status DC


Alprazolam (Xanax) 0.5 mg PRN TID  PRN PO ANXIETY / AGITATION Last administered 

on 7/23/20at 11:49;  Start 7/17/20 at 08:00;  Stop 7/23/20 at 15:54;  Status DC


Sodium Chloride 110 meq/Potassium Chloride 30 meq/ Potassium Acetate 30 

meq/Magnesium Sulfate 15 meq/ Multivitamins 10 ml/Chromium/ Copper/Manganese/ 

Seleni/Zn 1 ml/ Insulin Human Regular 15 unit/ Total Parenteral Nutrition/Amino 

Acids/Dextrose/ Fat Emulsion Intravenous 1,680 ml @  70 mls/hr TPN  CONT IV  

Last administered on 7/17/20at 21:21;  Start 7/17/20 at 22:00;  Stop 7/18/20 at 

21:59;  Status DC


Sodium Chloride 110 meq/Potassium Chloride 30 meq/ Potassium Acetate 30 

meq/Magnesium Sulfate 15 meq/ Multivitamins 10 ml/Chromium/ Copper/Manganese/ 

Seleni/Zn 1 ml/ Insulin Human Regular 15 unit/ Total Parenteral Nutrition/Amino 

Acids/Dextrose/ Fat Emulsion Intravenous 1,680 ml @  70 mls/hr TPN  CONT IV  

Last administered on 7/18/20at 22:01;  Start 7/18/20 at 22:00;  Stop 7/19/20 at 

21:59;  Status DC


Alteplase, Recombinant (Cathflo For Central Catheter Clearance) 1 mg 1X  ONCE 

INT CAT  Last administered on 7/19/20at 08:23;  Start 7/19/20 at 08:15;  Stop 

7/19/20 at 08:16;  Status DC


Sodium Chloride 110 meq/Sodium Phosphate 10 mmol/ Potassium Chloride 30 meq/ 

Potassium Acetate 30 meq/Magnesium Sulfate 15 meq/ Multivitamins 10 ml/Chromium/

Copper/Manganese/ Seleni/Zn 1 ml/ Insulin Human Regular 15 unit/ Total 

Parenteral Nutrition/Amino Acids/Dextrose/ Fat Emulsion Intravenous 1,680 ml @  

70 mls/hr TPN  CONT IV  Last administered on 7/19/20at 22:03;  Start 7/19/20 at 

22:00;  Stop 7/20/20 at 21:59;  Status DC


Sodium Chloride 120 meq/Sodium Phosphate 10 mmol/ Potassium Chloride 30 meq/ 

Potassium Acetate 30 meq/Magnesium Sulfate 15 meq/ Multivitamins 10 ml/Chromium/

Copper/Manganese/ Seleni/Zn 1 ml/ Insulin Human Regular 15 unit/ Total 

Parenteral Nutrition/Amino Acids/Dextrose/ Fat Emulsion Intravenous 1,680 ml @  

70 mls/hr TPN  CONT IV  Last administered on 7/20/20at 22:08;  Start 7/20/20 at 

22:00;  Stop 7/21/20 at 21:59;  Status DC


Ceftazidime/ Avibactam 2.5 gm/ Sodium Chloride 100 ml @  50 mls/hr Q8HRS IV  

Last administered on 7/22/20at 05:32;  Start 7/21/20 at 14:00;  Stop 7/22/20 at 

07:48;  Status DC


Alteplase, Recombinant (Cathflo For Central Catheter Clearance) 1 mg 1X  ONCE 

INT CAT  Last administered on 7/21/20at 09:30;  Start 7/21/20 at 09:30;  Stop 

7/21/20 at 09:31;  Status DC


Sodium Chloride 120 meq/Sodium Phosphate 10 mmol/ Potassium Chloride 30 meq/ 

Potassium Acetate 30 meq/Magnesium Sulfate 15 meq/ Multivitamins 10 ml/Chromium/

Copper/Manganese/ Seleni/Zn 1 ml/ Insulin Human Regular 15 unit/ Total Lisa

ral Nutrition/Amino Acids/Dextrose/ Fat Emulsion Intravenous 1,680 ml @  70 

mls/hr TPN  CONT IV  Last administered on 7/21/20at 22:11;  Start 7/21/20 at 

22:00;  Stop 7/22/20 at 21:59;  Status DC


Iohexol (Omnipaque 300 Mg/ml) 75 ml 1X  ONCE IV  Last administered on 7/21/20at 

11:30;  Start 7/21/20 at 11:30;  Stop 7/21/20 at 11:31;  Status DC


Info (CONTRAST GIVEN -- Rx MONITORING) 1 each PRN DAILY  PRN MC SEE COMMENTS;  

Start 7/21/20 at 11:45;  Stop 7/23/20 at 11:44;  Status DC


Alteplase, Recombinant (Cathflo For Central Catheter Clearance) 1 mg 1X  ONCE 

INT CAT  Last administered on 7/21/20at 12:17;  Start 7/21/20 at 12:00;  Stop 

7/21/20 at 12:01;  Status DC


Cefepime HCl (Maxipime) 2 gm Q12HR IVP  Last administered on 7/22/20at 20:53;  

Start 7/22/20 at 09:00;  Stop 7/23/20 at 07:30;  Status DC


Sodium Chloride 120 meq/Sodium Phosphate 10 mmol/ Potassium Chloride 30 meq/ 

Potassium Acetate 30 meq/Magnesium Sulfate 15 meq/ Multivitamins 10 ml/Chromium/

Copper/Manganese/ Seleni/Zn 1 ml/ Insulin Human Regular 15 unit/ Total 

Parenteral Nutrition/Amino Acids/Dextrose/ Fat Emulsion Intravenous 1,680 ml @  

70 mls/hr TPN  CONT IV  Last administered on 7/22/20at 21:25;  Start 7/22/20 at 

22:00;  Stop 7/23/20 at 21:59;  Status DC


Ceftazidime/ Avibactam 2.5 gm/ Sodium Chloride 250 ml @  125 mls/hr Q8HRS IV  

Last administered on 8/5/20at 05:38;  Start 7/23/20 at 08:00;  Stop 8/5/20 at 

08:20;  Status DC


Sodium Chloride 120 meq/Sodium Phosphate 10 mmol/ Potassium Chloride 30 meq/ 

Potassium Acetate 30 meq/Magnesium Sulfate 15 meq/ Multivitamins 10 ml/Chromium/

Copper/Manganese/ Seleni/Zn 1 ml/ Insulin Human Regular 15 unit/ Total 

Parenteral Nutrition/Amino Acids/Dextrose/ Fat Emulsion Intravenous 1,680 ml @  

70 mls/hr TPN  CONT IV  Last administered on 7/23/20at 22:35;  Start 7/23/20 at 

22:00;  Stop 7/24/20 at 21:59;  Status DC


Alprazolam (Xanax) 0.5 mg PRN QID  PRN PO ANXIETY / AGITATION Last administered 

on 8/14/20at 08:41;  Start 7/23/20 at 16:00


Acetaminophen/ Hydrocodone Bitart (Lortab 5/325) 1 tab PRN Q4HRS  PRN PO PAIN 

Last administered on 8/13/20at 12:25;  Start 7/23/20 at 16:00


Sodium Chloride 120 meq/Sodium Phosphate 10 mmol/ Potassium Chloride 30 meq/ 

Potassium Acetate 30 meq/Magnesium Sulfate 15 meq/ Multivitamins 10 ml/Chromium/

Copper/Manganese/ Seleni/Zn 1 ml/ Insulin Human Regular 15 unit/ Total 

Parenteral Nutrition/Amino Acids/Dextrose/ Fat Emulsion Intravenous 1,680 ml @  

70 mls/hr TPN  CONT IV  Last administered on 7/24/20at 21:50;  Start 7/24/20 at 

22:00;  Stop 7/25/20 at 21:59;  Status DC


Sodium Chloride 120 meq/Sodium Phosphate 10 mmol/ Potassium Chloride 30 meq/ 

Potassium Acetate 30 meq/Magnesium Sulfate 15 meq/ Multivitamins 10 ml/Chromium/

Copper/Manganese/ Seleni/Zn 1 ml/ Insulin Human Regular 15 unit/ Total 

Parenteral Nutrition/Amino Acids/Dextrose/ Fat Emulsion Intravenous 1,680 ml @  

70 mls/hr TPN  CONT IV  Last administered on 7/25/20at 22:14;  Start 7/25/20 at 

22:00;  Stop 7/26/20 at 21:59;  Status DC


Daptomycin 500 mg/ Sodium Chloride 50 ml @  100 mls/hr Q24H IV  Last 

administered on 8/4/20at 09:20;  Start 7/26/20 at 09:00;  Stop 8/5/20 at 08:20; 

Status DC


Sodium Chloride 120 meq/Sodium Phosphate 10 mmol/ Potassium Chloride 30 meq/ 

Potassium Acetate 30 meq/Magnesium Sulfate 15 meq/ Multivitamins 10 ml/Chromium/

Copper/Manganese/ Seleni/Zn 1 ml/ Insulin Human Regular 15 unit/ Total 

Parenteral Nutrition/Amino Acids/Dextrose/ Fat Emulsion Intravenous 1,680 ml @  

70 mls/hr TPN  CONT IV ;  Start 7/26/20 at 22:00;  Stop 7/27/20 at 21:59;  

Status Cancel


Amino Acids/ Glycerin/ Electrolytes 1,000 ml @  80 mls/hr [id number] IV  Last 

administered on 8/1/20at 18:10;  Start 7/26/20 at 10:15;  Stop 8/2/20 at 07:07; 

Status DC


Iohexol (Omnipaque 300 Mg/ml) 50 ml 1X  ONCE IJ ;  Start 7/28/20 at 11:00;  Stop

7/28/20 at 11:01;  Status DC


Sodium Chloride 500 ml @  500 mls/hr 1X  ONCE IV  Last administered on 7/28/20at

18:30;  Start 7/28/20 at 18:30;  Stop 7/28/20 at 19:29;  Status DC


Lidocaine HCl (Buffered Lidocaine 1%) 3 ml 1X  ONCE INJ ;  Start 7/30/20 at 

12:15;  Stop 7/30/20 at 12:17;  Status DC


Fentanyl Citrate (Fentanyl 2ml Vial) 25 mcg PRN Q6HRS  PRN IVP SEE INSTUCTIONS 

Last administered on 8/14/20at 13:33;  Start 8/3/20 at 10:00


Sodium Chloride 1,000 ml @  125 mls/hr Q8H IV  Last administered on 8/3/20at 

23:16;  Start 8/3/20 at 23:21;  Stop 8/4/20 at 09:23;  Status DC


Sodium Chloride 1,000 ml @  350 mls/hr 1X  ONCE IV  Last administered on 

8/3/20at 20:29;  Start 8/3/20 at 20:30;  Stop 8/3/20 at 23:21;  Status DC


Vitamin A/Vitamin D (Vitamin A & D Ointment) 1 thomas PRN Q1HR  PRN TP SKIN 

PROTECTION Last administered on 8/13/20at 08:36;  Start 8/4/20 at 09:15


Iohexol (Omnipaque 240 Mg/ml) 50 ml STK-MED ONCE .ROUTE ;  Start 8/4/20 at 

14:18;  Stop 8/4/20 at 14:19;  Status DC


Lidocaine HCl (Buffered Lidocaine 1%) 3 ml STK-MED ONCE .ROUTE ;  Start 8/4/20 

at 14:19;  Stop 8/4/20 at 14:19;  Status DC


Fentanyl Citrate (Fentanyl 2ml Vial) 100 mcg STK-MED ONCE .ROUTE ;  Start 8/4/20

at 15:03;  Stop 8/4/20 at 15:03;  Status DC


Lidocaine HCl (Buffered Lidocaine 1%) 5 ml 1X  ONCE INJ  Last administered on 

8/4/20at 15:00;  Start 8/4/20 at 15:45;  Stop 8/4/20 at 15:46;  Status DC


Iohexol (Omnipaque 240 Mg/ml) 10 ml 1X  ONCE IJ  Last administered on 8/4/20at 

15:00;  Start 8/4/20 at 15:45;  Stop 8/4/20 at 15:46;  Status DC


Multivitamins/ Minerals Therapeutic (Centrum Multivit-Mineral Liq) 5 ml DAILY 

PEG  Last administered on 8/15/20at 08:37;  Start 8/5/20 at 09:00


Alteplase, Recombinant 5 mg/ Sodium Chloride 30 ml @ 30 mls/hr 1X  PRN IV SEE 

COMMENTS;  Start 8/5/20 at 06:45;  Status UNV


Alteplase, Recombinant (Cathflo For Central Catheter Clearance) 1 mg 1X  ONCE 

INT CAT  Last administered on 8/5/20at 09:30;  Start 8/5/20 at 07:00;  Stop 

8/5/20 at 07:01;  Status DC


Sodium Chloride 1,000 ml @  125 mls/hr 1X  ONCE IV  Last administered on 

8/5/20at 16:12;  Start 8/5/20 at 14:30;  Stop 8/5/20 at 22:29;  Status DC


Furosemide (Lasix) 40 mg 1X  ONCE IVP  Last administered on 8/5/20at 16:17;  

Start 8/5/20 at 14:30;  Stop 8/5/20 at 14:33;  Status DC


Furosemide (Lasix) 40 mg [id number] IVP  Last administered on 8/6/20at 13:51;  Start 

8/6/20 at 09:00;  Stop 8/6/20 at 14:01;  Status DC


Sodium Chloride 1,000 ml @  125 mls/hr 1X  ONCE IV  Last administered on 

8/6/20at 08:20;  Start 8/6/20 at 07:45;  Stop 8/6/20 at 15:44;  Status DC


Calcitonin [place] (Miacalcin) 400 unit BID SQ  Last administered on 8/6/20at 

18:18;  Start 8/6/20 at 18:00;  Stop 8/7/20 at 15:56;  Status DC


Zoledronic Acid 100 ml @  400 mls/hr 1X  ONCE IV  Last administered on 8/7/20at 

13:04;  Start 8/7/20 at 12:00;  Stop 8/7/20 at 12:14;  Status DC


Metoprolol Tartrate (Lopressor Vial) 5 mg 1X  ONCE IVP  Last administered on 

8/9/20at 10:54;  Start 8/9/20 at 10:45;  Stop 8/9/20 at 10:46;  Status DC


Cefepime HCl (Maxipime) 2 gm Q12HR IVP  Last administered on 8/15/20at 08:55;  

Start 8/10/20 at 10:00


Metronidazole 100 ml @  100 mls/hr Q12HR IV  Last administered on 8/15/20at 

08:38;  Start 8/10/20 at 10:00


Sodium Chloride 1,000 ml @  75 mls/hr [id number] IV  Last administered on 8/11/20at

09:55;  Start 8/10/20 at 18:45;  Stop 8/12/20 at 11:12;  Status DC


Sodium Chloride 1,000 ml @  1,000 mls/hr 1X  ONCE IV  Last administered on 

8/11/20at 14:00;  Start 8/11/20 at 14:00;  Stop 8/11/20 at 14:59;  Status DC


Ringer's Solution 1,000 ml @  175 mls/hr [id number] IV  Last administered on 

8/15/20at 11:35;  Start 8/11/20 at 14:30


Sodium Bicarbonate (Sodium Bicarb Adult 8.4% Syr) 100 meq 1X  ONCE IV  Last 

administered on 8/11/20at 14:32;  Start 8/11/20 at 14:30;  Stop 8/11/20 at 

14:35;  Status DC


Sodium Bicarbonate (Sodium Bicarb Adult 8.4% Syr) 50 meq STK-MED ONCE .ROUTE ;  

Start 8/11/20 at 14:30;  Stop 8/11/20 at 14:30;  Status DC


Sodium Chloride 1,000 ml @  1,000 mls/hr 1X  ONCE IV  Last administered on 

8/12/20at 09:18;  Start 8/12/20 at 08:45;  Stop 8/12/20 at 09:44;  Status DC


Sodium Chloride 1,000 ml @  1,000 mls/hr 1X  ONCE IV  Last administered on 

8/12/20at 17:17;  Start 8/12/20 at 17:15;  Stop 8/12/20 at 18:14;  Status DC


Sodium Chloride 1,000 ml @  1,000 mls/hr 1X  ONCE IV  Last administered on 

8/13/20at 09:39;  Start 8/13/20 at 09:45;  Stop 8/13/20 at 10:44;  Status DC


Sodium Chloride 1,000 ml @  1,000 mls/hr 1X  ONCE IV  Last administered on 

8/13/20at 17:36;  Start 8/13/20 at 18:00;  Stop 8/13/20 at 18:59;  Status DC


Sodium Chloride 1,000 ml @  1,000 mls/hr 1X  ONCE IV  Last administered on 

8/14/20at 13:34;  Start 8/14/20 at 13:00;  Stop 8/14/20 at 13:59;  Status DC


Calcium Gluconate (Calcium Gluconate) 1,000 mg 1X  ONCE IVP  Last administered 

on 8/14/20at 15:25;  Start 8/14/20 at 14:45;  Stop 8/14/20 at 14:53;  Status DC


Potassium Chloride/Water 100 ml @  100 mls/hr Q1H IV  Last administered on 

8/14/20at 17:09;  Start 8/14/20 at 15:00;  Stop 8/14/20 at 16:59;  Status DC


Magnesium Sulfate 50 ml @ 25 mls/hr 1X  ONCE IV  Last administered on 8/14/20at 

15:24;  Start 8/14/20 at 15:00;  Stop 8/14/20 at 16:59;  Status DC


Sodium Bicarbonate 150 meq/Sterile Water 1,150 ml @  500 mls/hr 1X  ONCE IV  

Last administered on 8/14/20at 15:23;  Start 8/14/20 at 15:00;  Stop 8/14/20 at 

17:17;  Status DC





Active Scripts


Active


Reported


Bisoprolol Fumarate 5 Mg Tablet 10 Mg PO DAILY


Vitals/I & O





Vital Sign - Last 24 Hours








 8/14/20 8/14/20 8/14/20 8/14/20





 13:33 14:03 15:16 16:21


 


Temp   97.5 





   97.5 


 


Pulse   130 


 


Resp 20 18 36 


 


B/P (MAP)   135/62 (86) 


 


Pulse Ox   97 


 


O2 Delivery Nasal Cannula Nasal Cannula Nasal Cannula Nasal Cannula


 


O2 Flow Rate 2.0 2.0 2.0 2.0


 


    





    





 8/14/20 8/14/20 8/14/20 8/14/20





 19:37 20:00 20:30 23:14


 


Temp 97.7   98.3





 97.7   98.3


 


Pulse 126   126


 


Resp 21   22


 


B/P (MAP) 124/67 (86)   119/68 (85)


 


Pulse Ox 97   97


 


O2 Delivery Nasal Cannula Nasal Cannula Nasal Cannula Nasal Cannula


 


O2 Flow Rate 2.0 2.0 2.0 2.0


 


    





    





 8/15/20 8/15/20 8/15/20 8/15/20





 03:07 03:25 07:00 07:55


 


Temp  97.8 97.9 





  97.8 97.9 


 


Pulse  121 121 


 


Resp  19 20 


 


B/P (MAP)  107/63 (78) 98/48 (65) 


 


Pulse Ox  99 99 


 


O2 Delivery Nasal Cannula Nasal Cannula Nasal Cannula Nasal Cannula


 


O2 Flow Rate 2.0 2.0 2.0 2.0


 


    





    





 8/15/20 8/15/20 8/15/20 





 08:38 10:59 11:44 


 


Temp  97.8  





  97.8  


 


Pulse  127  


 


Resp  20  


 


B/P (MAP)  117/68 (84)  


 


Pulse Ox 97 98  


 


O2 Delivery Tracheal Collar Nasal Cannula Nasal Cannula 


 


O2 Flow Rate  2.0 2.0 














Intake and Output   


 


 8/14/20 8/14/20 8/15/20





 15:00 23:00 07:00


 


Intake Total   0 ml


 


Output Total 225 ml 750 ml 1000 ml


 


Balance -225 ml -750 ml -1000 ml











Justicifation of Admission Dx:


Justifications for Admission:


Justification of Admission Dx:  Yes











ANGELICA MARTINEZ MD            Aug 15, 2020 12:59

## 2020-08-16 VITALS — SYSTOLIC BLOOD PRESSURE: 108 MMHG | DIASTOLIC BLOOD PRESSURE: 57 MMHG

## 2020-08-16 VITALS — SYSTOLIC BLOOD PRESSURE: 101 MMHG | DIASTOLIC BLOOD PRESSURE: 48 MMHG

## 2020-08-16 VITALS — SYSTOLIC BLOOD PRESSURE: 117 MMHG | DIASTOLIC BLOOD PRESSURE: 59 MMHG

## 2020-08-16 VITALS — DIASTOLIC BLOOD PRESSURE: 97 MMHG | SYSTOLIC BLOOD PRESSURE: 112 MMHG

## 2020-08-16 VITALS — SYSTOLIC BLOOD PRESSURE: 100 MMHG | DIASTOLIC BLOOD PRESSURE: 63 MMHG

## 2020-08-16 VITALS — DIASTOLIC BLOOD PRESSURE: 100 MMHG | SYSTOLIC BLOOD PRESSURE: 125 MMHG

## 2020-08-16 LAB
% BANDS: 26 % (ref 0–9)
% LYMPHS: 12 % (ref 24–48)
% MONOS: 4 % (ref 0–10)
% MYELOS: 1 % (ref 0–0)
% SEGS: 56 % (ref 35–66)
ANION GAP SERPL CALC-SCNC: 14 MMOL/L (ref 6–14)
ANISOCYTOSIS BLD QL SMEAR: PRESENT
BASOPHILS # BLD AUTO: 0 X10^3/UL (ref 0–0.2)
BASOPHILS NFR BLD: 0 % (ref 0–3)
BUN SERPL-MCNC: 58 MG/DL (ref 7–20)
CALCIUM SERPL-MCNC: 7.4 MG/DL (ref 8.5–10.1)
CHLORIDE SERPL-SCNC: 120 MMOL/L (ref 98–107)
CO2 SERPL-SCNC: 18 MMOL/L (ref 21–32)
CREAT SERPL-MCNC: 2.1 MG/DL (ref 0.6–1)
EOSINOPHIL NFR BLD AUTO: 1 % (ref 0–5)
EOSINOPHIL NFR BLD: 0 % (ref 0–3)
EOSINOPHIL NFR BLD: 0.1 X10^3/UL (ref 0–0.7)
ERYTHROCYTE [DISTWIDTH] IN BLOOD BY AUTOMATED COUNT: 20.4 % (ref 11.5–14.5)
GFR SERPLBLD BASED ON 1.73 SQ M-ARVRAT: 25 ML/MIN
GLUCOSE SERPL-MCNC: 157 MG/DL (ref 70–99)
HCT VFR BLD CALC: 21.3 % (ref 36–47)
HCT VFR BLD CALC: 26.4 % (ref 36–47)
HGB BLD-MCNC: 6.3 G/DL (ref 12–15.5)
HGB BLD-MCNC: 8.2 G/DL (ref 12–15.5)
LYMPHOCYTES # BLD: 2 X10^3/UL (ref 1–4.8)
LYMPHOCYTES NFR BLD AUTO: 12 % (ref 24–48)
MCH RBC QN AUTO: 28 PG (ref 25–35)
MCHC RBC AUTO-ENTMCNC: 30 G/DL (ref 31–37)
MCHC RBC AUTO-ENTMCNC: 31 G/DL (ref 31–37)
MCV RBC AUTO: 89 FL (ref 79–100)
MONO #: 1.1 X10^3/UL (ref 0–1.1)
MONOCYTES NFR BLD: 7 % (ref 0–9)
NEUT #: 13.3 X10^3/UL (ref 1.8–7.7)
NEUTROPHILS NFR BLD AUTO: 81 % (ref 31–73)
NRBC # BLD MANUAL: 1 10*3/UL
PLATELET # BLD AUTO: 359 X10^3/UL (ref 140–400)
PLATELET # BLD EST: ADEQUATE 10*3/UL
POIKILOCYTOSIS BLD QL SMEAR: PRESENT
POLYCHROMASIA BLD QL SMEAR: PRESENT
POTASSIUM SERPL-SCNC: 3.2 MMOL/L (ref 3.5–5.1)
PROTHROMBIN TIME: 34.6 SEC (ref 11.7–14)
RBC # BLD AUTO: 2.97 X10^6/UL (ref 3.5–5.4)
SODIUM SERPL-SCNC: 152 MMOL/L (ref 136–145)
TOXIC GRANULES BLD QL SMEAR: SLIGHT
WBC # BLD AUTO: 16.5 X10^3/UL (ref 4–11)

## 2020-08-16 RX ADMIN — ALBUMIN (HUMAN) SCH MLS/HR: 0.25 INJECTION, SOLUTION INTRAVENOUS at 18:03

## 2020-08-16 RX ADMIN — MULTIVITAMIN SCH ML: LIQUID ORAL at 09:30

## 2020-08-16 RX ADMIN — ACETAMINOPHEN PRN MG: 650 SOLUTION ORAL at 21:02

## 2020-08-16 RX ADMIN — ENOXAPARIN SODIUM SCH MG: 40 INJECTION SUBCUTANEOUS at 09:31

## 2020-08-16 RX ADMIN — ALBUMIN (HUMAN) SCH MLS/HR: 0.25 INJECTION, SOLUTION INTRAVENOUS at 05:46

## 2020-08-16 RX ADMIN — INSULIN LISPRO SCH UNITS: 100 INJECTION, SOLUTION INTRAVENOUS; SUBCUTANEOUS at 12:00

## 2020-08-16 RX ADMIN — CEFEPIME SCH GM: 2 INJECTION, POWDER, FOR SOLUTION INTRAVENOUS at 09:32

## 2020-08-16 RX ADMIN — ACETAMINOPHEN PRN MG: 650 SOLUTION ORAL at 04:19

## 2020-08-16 RX ADMIN — HYDROCODONE BITARTRATE AND ACETAMINOPHEN PRN TAB: 5; 325 TABLET ORAL at 14:56

## 2020-08-16 RX ADMIN — ACETYLCYSTEINE SCH MG: 200 INHALANT RESPIRATORY (INHALATION) at 07:33

## 2020-08-16 RX ADMIN — SODIUM CHLORIDE, SODIUM LACTATE, POTASSIUM CHLORIDE, AND CALCIUM CHLORIDE SCH MLS/HR: .6; .31; .03; .02 INJECTION, SOLUTION INTRAVENOUS at 14:56

## 2020-08-16 RX ADMIN — IPRATROPIUM BROMIDE AND ALBUTEROL SULFATE SCH ML: .5; 3 SOLUTION RESPIRATORY (INHALATION) at 04:29

## 2020-08-16 RX ADMIN — IPRATROPIUM BROMIDE AND ALBUTEROL SULFATE SCH ML: .5; 3 SOLUTION RESPIRATORY (INHALATION) at 20:07

## 2020-08-16 RX ADMIN — IPRATROPIUM BROMIDE AND ALBUTEROL SULFATE SCH ML: .5; 3 SOLUTION RESPIRATORY (INHALATION) at 23:50

## 2020-08-16 RX ADMIN — MEROPENEM SCH MLS/HR: 500 INJECTION, POWDER, FOR SOLUTION INTRAVENOUS at 16:34

## 2020-08-16 RX ADMIN — INSULIN LISPRO SCH UNITS: 100 INJECTION, SOLUTION INTRAVENOUS; SUBCUTANEOUS at 18:00

## 2020-08-16 RX ADMIN — IPRATROPIUM BROMIDE AND ALBUTEROL SULFATE SCH ML: .5; 3 SOLUTION RESPIRATORY (INHALATION) at 07:32

## 2020-08-16 RX ADMIN — IPRATROPIUM BROMIDE AND ALBUTEROL SULFATE SCH ML: .5; 3 SOLUTION RESPIRATORY (INHALATION) at 11:29

## 2020-08-16 RX ADMIN — IPRATROPIUM BROMIDE AND ALBUTEROL SULFATE SCH ML: .5; 3 SOLUTION RESPIRATORY (INHALATION) at 16:02

## 2020-08-16 RX ADMIN — IPRATROPIUM BROMIDE AND ALBUTEROL SULFATE SCH ML: .5; 3 SOLUTION RESPIRATORY (INHALATION) at 04:30

## 2020-08-16 RX ADMIN — INSULIN LISPRO SCH UNITS: 100 INJECTION, SOLUTION INTRAVENOUS; SUBCUTANEOUS at 06:00

## 2020-08-16 RX ADMIN — PANTOPRAZOLE SODIUM SCH MG: 40 INJECTION, POWDER, FOR SOLUTION INTRAVENOUS at 09:30

## 2020-08-16 RX ADMIN — ACETYLCYSTEINE SCH MG: 200 INHALANT RESPIRATORY (INHALATION) at 20:00

## 2020-08-16 RX ADMIN — INSULIN LISPRO SCH UNITS: 100 INJECTION, SOLUTION INTRAVENOUS; SUBCUTANEOUS at 00:00

## 2020-08-16 NOTE — PDOC
[facility] PROGRESS NOTE


Date of Service


DOS:


DATE: 8/16/20 


TIME: 12:51





Chief Complaint


Chief Complaint


S/P Exp. 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 

parapsilosis & PSAE 07/26 (I-merrem/ceftazidime/AZT/cefepime))


Leucocytosis 


Fevers, intermittent


Acute gallstone pancreatitis with persistent necrosis


Acute hypoxic Respiratory failure required mechanical ventilation


Tracheostomy 


Bilateral pleural effusions/pulm edema s/p Throacentesis on 6/15/2020


HTN 


Intractable pain


Intractable nausea


Covid 19 negative


Acute on chronic anemia 


Abd distention - U/S and CT reviewed s/p 0.4 L of opaque, debris-containing a

scites was removed 5/6


Gallstone pancreatitis with necrosis. 


   -CT A/P 6/6 showed multiple pseudocysts, slight larger on the right. s/p 

drains x 3, 6/7.  + PSAE (MDRO-R Cefepime, Zosyn ANSON < 64) and yeast, 


   -s/p drain 4/27. C. parapsilosis. s/p drain 5/6 + yeast & high amylase; s/p 

additional drain on 5/8. Drains removed. 


Ascites s/p paracentesis 4/15 & 5/6. C. parapsilosis 


JED. off HD. 


A large fluid collection in the pancreatic bed has slightly decreased in size, 

described below, the pancreas itself is difficult to  visualize, which could be 

due to necrosis or obscuration of pancreatic  parenchyma from the surrounding 

fluid collection.6/15 


- 4/27 status post ROBERT drain placement + C paropsilosis. s/p additional drains 

5/8


Hypocalcemia 


Prediabetes


s/p trach


Hyperglycemia


Severe protein-caloric malnutrition


Extensive retroperitoneal fluid collections persist. Percutaneous  drains remain

within the collections in both paracolic gutters. These  communicate with 

additional pelvic and peripancreatic collections.





History of Present Illness


History of Present Illness


08/16/2020


Patient seen and examined, appears encephalopathic 


Patient is largely nonverbal 


Discussed with RN 


Chart reviewed 











8/15  Patient without complaints. Per nurse reports some hypotension this am 

that improved spontaneously.


8/14  Will check Hb/Hct levels today in response to prbc; patient largely 

nonverbal but denies pain


8/13,   increased IV fluid, cont current


bolusing daily ,  LR increased


cr better,    Hgb worse today, will give 1 u PRBC





8/12.   pain in legs,    BP better,  some better Urine outptu





8/11.   dyspnea and mild distress worsened this AM.  but was able to walk some 

with PT later.


still  sometimes,    








08/10/2020


Patient seen and examined


Afebrile


Resting in NAD


Tachycardic and tachypneic overnight


Recurring leukocytosis, 23.0


Chart reviewed


Discussed with RN





08/9: Afebrile.  Calcium down to 10.1.  Little more tachycardic today with some 

more secretions. No O2 requirements. Does not wish to wear her speaking valve. 

Will check CXR.


8/8: Afebrile, weak, having more secretions not wearing a speaking valve today. 

WBC 10, Hb 8.8, , and K3.7, BUN 12, CR 0.5, calcium down to 10.3.  After 

zoledronic acid


8/7: Had a rash after calcitonin injection.  Discussed with nephrology with her 

calcium moving from 11.3-10.9 will give IV bisphosphonate today, zoledronic 

acid.


8/6: Hb stable. Afebrile. Weak. Calcium increased. Shortness of breath is 

improving. Plan for calcitonin today, lasix, and saline


8/5: Hemoglobin abnormally low on repeat has been stable 7.2.  Calcium up to 

10.9.  She is able to work with PT, she is asking to eat.  Social work is been 

having difficulties with both of her daughters completing the financial aspect 

of disability paperwork which is been prolonging her stay over the past 5 

months.  Plan to check PTH and to treat hypercalcemia with 1 L normal saline 40 

mg of IV Lasix and repeat labs in a.m.


8/4: Not wishing to wear her speaking valve. J tube having some difficulties. 

Afebrile. Rolf bedside to aid her. transferred from ICU today


8/3: No overnight events. Calcium 10.7 on AM labs. She is still a bit slow to to

respond, but follows commands well. Does not want speaking valve with me. Pain 

is better controlled in her abdomen. Wound care to see today. Will check liver 

function and phos levels given her calcium elevation.


8/2: Patient seen and examined bedside.  Positive fluid balance in the past 24 

hours.  Patient's chart, labs, images were reviewed and discussed with RN


8/1: No acute events overnight.  Seen and examined at bedside.  Patient had a 

fever 100.2.  Negative fluid balance of 150 cc.  Patient's chart, labs, images 

were reviewed and discussed with RN


7/31: Patient seen and examined at bedside.  No acute events overnight.  

Positive fluid balance 633.  Patient's chart, labs, images were reviewed and 

discussed with RN


7/30: Patient seen and examined bedside.  No acute events overnight.  Patient's 

chart, labs, images were reviewed and discussed with RN


7/28: Patient seen and examined bedside.  Patient appears to be in no obvious 

pain.  All drains have been adequately draining.  Patient continues to be on 

TPN.  Chart labs and imaging was reviewed.  Negative fluid balance of 270 cc


7/27:Patient seen and examined in the ICU.  Patient still requires extensive 

care.  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  Chart, labs, imaging was

reviewed.  UOP with + 500cc balance.


7/25: Patient seen in and examined in the ICU. She is still extremely critically

ill. Appears weak, frail, and pale. Better color today with improved eye contact

and expression. Discussed with RN. Chart reviewed


7/21: Patient seen and examined in the ICU, She is still extremely critically 

ill, Appears extremely weak frail and pale, Discussed with RN, Chart reviewed





Vitals/I&O


Vitals/I&O:





                                   Vital Signs








  Date Time  Temp Pulse Resp B/P (MAP) Pulse Ox O2 Delivery O2 Flow Rate FiO2


 


8/16/20 11:29     97 Nasal Cannula 2.0 


 


8/16/20 11:20 97.8 131 28 108/57 (74)    





 97.8       














                                    I & O   


 


 8/15/20 8/15/20 8/16/20





 15:00 23:00 07:00


 


Intake Total 200 ml 200 ml 200 ml


 


Output Total  1375 ml 1025 ml


 


Balance 200 ml -1175 ml -825 ml











Physical Exam


Physical Exam:


GENERAL: Propped up in bed, her eyes are wide open, upward gaze, unresponsive to

verbal, + visual threat 


HEENT: Pupils equal, oral cavity dry. 


NECK:  Tracheostomy 


LUNGS: Diminished aeration bases,


HEART:  S1, S2, 


ABDOMEN: Distended, bowel sounds hypoactive, soft, GJ drain present, drainage 

bag in place, + rectal tube 


: [place] in place 


EXTREMITIES: Genralized edema, + mottling 


SKIN: warm touch. 


NEURO: - Eyes are open, unresponsive to verbal and tactile stimuli 


RUE  PICC site without signs of complications. PIV s clean


General:  Cooperative, mild distress


Heart:  Normal S1, Normal S2, Other


Lungs:  Clear


Abdomen:  Normal bowel sounds, Soft, No tenderness


Extremities:  No clubbing, No cyanosis, Other (Diffuse edema)


Skin:  No breakdown





Labs


Labs:





Laboratory Tests








Test


 8/15/20


16:46 8/16/20


00:15 8/16/20


05:20 8/16/20


11:47


 


Glucose (Fingerstick)


 169 mg/dL


(70-99) 132 mg/dL


(70-99) 


 195 mg/dL


(70-99)


 


White Blood Count


 


 


 16.5 x10^3/uL


(4.0-11.0) 





 


Red Blood Count


 


 


 2.97 x10^6/uL


(3.50-5.40) 





 


Hemoglobin


 


 


 8.2 g/dL


(12.0-15.5) 





 


Hematocrit


 


 


 26.4 %


(36.0-47.0) 





 


Mean Corpuscular Volume   89 fL ()  


 


Mean Corpuscular Hemoglobin   28 pg (25-35)  


 


Mean Corpuscular Hemoglobin


Concent 


 


 31 g/dL


(31-37) 





 


Red Cell Distribution Width


 


 


 20.4 %


(11.5-14.5) 





 


Platelet Count


 


 


 359 x10^3/uL


(140-400) 





 


Neutrophils (%) (Auto)   81 % (31-73)  


 


Lymphocytes (%) (Auto)   12 % (24-48)  


 


Monocytes (%) (Auto)   7 % (0-9)  


 


Eosinophils (%) (Auto)   0 % (0-3)  


 


Basophils (%) (Auto)   0 % (0-3)  


 


Neutrophils # (Auto)


 


 


 13.3 x10^3/uL


(1.8-7.7) 





 


Lymphocytes # (Auto)


 


 


 2.0 x10^3/uL


(1.0-4.8) 





 


Monocytes # (Auto)


 


 


 1.1 x10^3/uL


(0.0-1.1) 





 


Eosinophils # (Auto)


 


 


 0.1 x10^3/uL


(0.0-0.7) 





 


Basophils # (Auto)


 


 


 0.0 x10^3/uL


(0.0-0.2) 





 


Segmented Neutrophils %   56 % (35-66)  


 


Band Neutrophils %   26 % (0-9)  


 


Lymphocytes %   12 % (24-48)  


 


Monocytes %   4 % (0-10)  


 


Eosinophils %   1 % (0-5)  


 


Myelocytes %   1 % (0-0)  


 


Nucleated Red Blood Cells   1  


 


Toxic Granulation   Slight  


 


Platelet Estimate


 


 


 Adequate


(ADEQUATE) 





 


Polychromasia   Present  


 


Poikilocytosis   Present  


 


Anisocytosis   Present  


 


Sodium Level


 


 


 152 mmol/L


(136-145) 





 


Potassium Level


 


 


 3.2 mmol/L


(3.5-5.1) 





 


Chloride Level


 


 


 120 mmol/L


() 





 


Carbon Dioxide Level


 


 


 18 mmol/L


(21-32) 





 


Anion Gap   14 (6-14)  


 


Blood Urea Nitrogen


 


 


 58 mg/dL


(7-20) 





 


Creatinine


 


 


 2.1 mg/dL


(0.6-1.0) 





 


Estimated GFR


(Cockcroft-Gault) 


 


 25.0 


 





 


Glucose Level


 


 


 157 mg/dL


(70-99) 





 


Calcium Level


 


 


 7.4 mg/dL


(8.5-10.1) 














Assessment and Plan


Assessmemt and Plan


Problems


Medical Problems:


(1) Acute pancreatitis


Status: Acute  





(2) Cholelithiasis


Status: Acute  





ASSESSMENT 


S/P Exp. 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 para

psilosis & PSAE 07/26 (I-merrem/ceftazidime/AZT/cefepime))


Leucocytosis 


Fevers, intermittent


Acute gallstone pancreatitis with persistent necrosis


Acute hypoxic Respiratory failure required mechanical ventilation


Tracheostomy 


Bilateral pleural effusions/pulm edema s/p Throacentesis on 6/15/2020


HTN 


Intractable pain


Intractable nausea


Covid 19 negative


Acute on chronic anemia 


Abd distention - U/S and CT reviewed s/p 0.4 L of opaque, debris-containing 

ascites was removed 5/6


Gallstone pancreatitis with necrosis. 


   -CT A/P 6/6 showed multiple pseudocysts, slight larger on the right. s/p 

drains x 3, 6/7.  + PSAE (MDRO-R Cefepime, Zosyn ANSON < 64) and yeast, 


   -s/p drain 4/27. C. parapsilosis. s/p drain 5/6 + yeast & high amylase; s/p 

additional drain on 5/8. Drains removed. 


Ascites s/p paracentesis 4/15 & 5/6. C. parapsilosis 


JED. off HD. 


A large fluid collection in the pancreatic bed has slightly decreased in size, 

described below, the pancreas itself is difficult to  visualize, which could be 

due to necrosis or obscuration of pancreatic  parenchyma from the surrounding 

fluid collection.6/15 


- 4/27 status post ROBERT drain placement + C paropsilosis. s/p additional drains 

5/8


Hypocalcemia 


Prediabetes


s/p trach


Hyperglycemia


Severe protein-caloric malnutrition


Extensive retroperitoneal fluid collections persist. Percutaneous  drains remain

within the collections in both paracolic gutters. These  communicate with 

additional pelvic and peripancreatic collection





PLAN 


PT/OT 


Trend labs 


Continue current care


DVT prophylaxis  


Full code 


Subspecialty input appreciated 








Comment


Review of Relevant


I have reviewed the following items josy (where applicable) has been applied.


Medications:





Current Medications








 Medications


  (Trade)  Dose


 Ordered  Sig/Yee


 Route


 PRN Reason  Start Time


 Stop Time Status Last Admin


Dose Admin


 


 Albumin Human  100 ml @ 


 100 mls/hr  Q12H


 IV


   8/15/20 18:00


    8/16/20 05:46





 


 Ringer's Solution  1,000 ml @ 


 75 mls/hr  [id number]


 IV


   8/15/20 23:00


    8/15/20 22:30





 


 Acetaminophen


  (Tylenol)  650 mg  PRN Q6HRS  PRN


 PEG


 MILD PAIN / TEMP > 100.3'F  8/16/20 04:15


    8/16/20 04:19














Justicifation of Admission Dx:


Justifications for Admission:


Justification of Admission Dx:  Yes











HECTOR MASON III DO           Aug 16, 2020 12:56

## 2020-08-16 NOTE — PDOC
Infectious Disease Note


Subjective


Subjective


Unresponsive


Fever 101.3 this morning 


+ diarrhea





ROS


ROS


unobtainable





Vital Sign


Vital Signs





Vital Signs








  Date Time  Temp Pulse Resp B/P (MAP) Pulse Ox O2 Delivery O2 Flow Rate FiO2


 


8/16/20 07:33     97 Nasal Cannula 2.0 


 


8/16/20 07:00 98.1 123 26 101/48 (65)    





 98.1       











Physical Exam


PHYSICAL EXAM


GENERAL: Propped up in bed, her eyes are wide open, upward gaze, unresponsive to

verbal, + visual threat 


HEENT: Pupils equal, oral cavity dry. 


NECK:  Tracheostomy 


LUNGS: Diminished aeration bases,


HEART:  S1, S2, 


ABDOMEN: Distended, bowel sounds hypoactive, soft, GJ drain present, drainage 

bag in place, + rectal tube 


: [place] in place 


EXTREMITIES: Genralized edema, + mottling 


SKIN: warm touch. 


NEURO: - Eyes are open, unresponsive to verbal and tactile stimuli 


RUE  PICC site without signs of complications. PIV s clean





Labs


Lab





Laboratory Tests








Test


 8/15/20


12:38 8/15/20


16:46 8/16/20


00:15 8/16/20


05:20


 


Glucose (Fingerstick)


 167 mg/dL


(70-99) 169 mg/dL


(70-99) 132 mg/dL


(70-99) 





 


White Blood Count


 


 


 


 16.5 x10^3/uL


(4.0-11.0)


 


Red Blood Count


 


 


 


 2.97 x10^6/uL


(3.50-5.40)


 


Hemoglobin


 


 


 


 8.2 g/dL


(12.0-15.5)


 


Hematocrit


 


 


 


 26.4 %


(36.0-47.0)


 


Mean Corpuscular Volume    89 fL () 


 


Mean Corpuscular Hemoglobin    28 pg (25-35) 


 


Mean Corpuscular Hemoglobin


Concent 


 


 


 31 g/dL


(31-37)


 


Red Cell Distribution Width


 


 


 


 20.4 %


(11.5-14.5)


 


Platelet Count


 


 


 


 359 x10^3/uL


(140-400)


 


Neutrophils (%) (Auto)    81 % (31-73) 


 


Lymphocytes (%) (Auto)    12 % (24-48) 


 


Monocytes (%) (Auto)    7 % (0-9) 


 


Eosinophils (%) (Auto)    0 % (0-3) 


 


Basophils (%) (Auto)    0 % (0-3) 


 


Neutrophils # (Auto)


 


 


 


 13.3 x10^3/uL


(1.8-7.7)


 


Lymphocytes # (Auto)


 


 


 


 2.0 x10^3/uL


(1.0-4.8)


 


Monocytes # (Auto)


 


 


 


 1.1 x10^3/uL


(0.0-1.1)


 


Eosinophils # (Auto)


 


 


 


 0.1 x10^3/uL


(0.0-0.7)


 


Basophils # (Auto)


 


 


 


 0.0 x10^3/uL


(0.0-0.2)


 


Sodium Level


 


 


 


 152 mmol/L


(136-145)


 


Potassium Level


 


 


 


 3.2 mmol/L


(3.5-5.1)


 


Chloride Level


 


 


 


 120 mmol/L


()


 


Carbon Dioxide Level


 


 


 


 18 mmol/L


(21-32)


 


Anion Gap    14 (6-14) 


 


Blood Urea Nitrogen


 


 


 


 58 mg/dL


(7-20)


 


Creatinine


 


 


 


 2.1 mg/dL


(0.6-1.0)


 


Estimated GFR


(Cockcroft-Gault) 


 


 


 25.0 





 


Glucose Level


 


 


 


 157 mg/dL


(70-99)


 


Calcium Level


 


 


 


 7.4 mg/dL


(8.5-10.1)








Micro








Objective


Assessment


Patient with prolonged hospitalization more than 3 months


Multiple medical problems


Multiple surgical procedures








Intermittent fevers 


Pyruia from 8/10


S/P Exp. 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 

parapsilosis & PSAE (I-merrem/ceftazidime/AZT/cefepime), treated


Leukocytosis - trending up 


Loose stool on tube feed - C. diff neg 7/25, 8/12


Anemia


Acute gallstone pancreatitis with persistent necrosis


  - 4/9.  CT A/P Increased ascites. Persistent evidence of necrotizing 

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


  - 4/27. status post ROBERT drain placement; C. parapsilosis. s/p drain 5/6 + yeast

& high amylase; s/p additional drain on 5/8. Drains removed. 


  -5/6. fluid  candida parapsilosis fluid, amylase high


  - 6/6 showed multiple pseudocysts, slight larger on the right. s/p drains x 3,

6/7.  PSAE (MDRO-R Cefepime, Zosyn ANSON < 64) and yeast, 


  -6/7 s/p drain replacement x 3; fluid cult PSAE (MDRO), yeast; treated


  -7/12 CT A/P shows smaller fluid collections.  


  -722 CT abdomen and pelvis drains in place       


Ascites s/p paracentesis 4/15 & 5/6. C. parapsilosis 


Cholelithiasis with thickening of the gallbladder wall.


JED with electrolyte imbalances. h/o HD 


Acute hypoxic resp failure. trach/vent. sputum 6/13  PSAE (I merrem) ; sputum 

culture July 19+ for PSAE R Merrem, sensitive to cefepime


Pleural effusion status post CTS left side


Abdominal fluid culture 6 /7 MDRO Pseudomonas, yeast


Sputum culture positive 7/19 for MDRO Pseudomonas


Chest tube fluid positive for 7/21 Candida Parapsilosis


EC fistula


Severe PCM


Critical illness myopathy


Gen debility


Last urine culture Candida parapsilosis. [place] changed


Diarrhea C. difficile negative


Encephalopathy





Plan


Plan of Care


cefepime, renal dosing as needed and flagyl 8/10,


C diff neg 8/12


BC 8/10 negative


Monitor labs/temp 


[place] changed 8/11


Nystatin to groin


Right upper extremity PICC line placed 7/31


Wound care /drain management as directed


Contact isolation for CRE/MDRO








Long term prognosis  poor





D/w nursing








The patient was seen and  examined at the bedside. The chart was reviewed. The 

case was discussed. Agree with the plan of care.


Patient had temperature yesterday of 101


WBC elevated at 16 K


Will change regimen to daptomycin, Merrem and micafungin


Repeat blood cultures











HAVEN WINTERS        Aug 16, 2020 09:44


IVAN FRANZ MD           Aug 16, 2020 14:49

## 2020-08-16 NOTE — NUR
While turning patient after meds this evening, RN noticed that the fluid draining from pt's 
right abdomen had changed from dark green to dark red. Most likely now draining bloody 
fluids. Mottling was also noted on pt's arms and legs, and heart rate increased from 130's 
on 8/15 night shift to 140's-160's on 8/16 night shift.



Dr. Franco paged at this time and updated on pt's condition and new findings. No new orders 
at this time, Dr. Franco also updated on pt's family situation regarding DPOA. Will continue 
to monitor.

## 2020-08-17 VITALS — DIASTOLIC BLOOD PRESSURE: 45 MMHG | SYSTOLIC BLOOD PRESSURE: 88 MMHG

## 2020-08-17 VITALS — SYSTOLIC BLOOD PRESSURE: 83 MMHG | DIASTOLIC BLOOD PRESSURE: 40 MMHG

## 2020-08-17 VITALS — SYSTOLIC BLOOD PRESSURE: 76 MMHG | DIASTOLIC BLOOD PRESSURE: 40 MMHG

## 2020-08-17 VITALS — DIASTOLIC BLOOD PRESSURE: 58 MMHG | SYSTOLIC BLOOD PRESSURE: 105 MMHG

## 2020-08-17 VITALS — DIASTOLIC BLOOD PRESSURE: 48 MMHG | SYSTOLIC BLOOD PRESSURE: 94 MMHG

## 2020-08-17 VITALS — SYSTOLIC BLOOD PRESSURE: 109 MMHG | DIASTOLIC BLOOD PRESSURE: 55 MMHG

## 2020-08-17 VITALS — DIASTOLIC BLOOD PRESSURE: 61 MMHG | SYSTOLIC BLOOD PRESSURE: 111 MMHG

## 2020-08-17 VITALS — DIASTOLIC BLOOD PRESSURE: 55 MMHG | SYSTOLIC BLOOD PRESSURE: 100 MMHG

## 2020-08-17 VITALS — DIASTOLIC BLOOD PRESSURE: 44 MMHG | SYSTOLIC BLOOD PRESSURE: 84 MMHG

## 2020-08-17 VITALS — DIASTOLIC BLOOD PRESSURE: 34 MMHG | SYSTOLIC BLOOD PRESSURE: 81 MMHG

## 2020-08-17 VITALS — SYSTOLIC BLOOD PRESSURE: 71 MMHG | DIASTOLIC BLOOD PRESSURE: 38 MMHG

## 2020-08-17 VITALS — SYSTOLIC BLOOD PRESSURE: 101 MMHG | DIASTOLIC BLOOD PRESSURE: 49 MMHG

## 2020-08-17 VITALS — DIASTOLIC BLOOD PRESSURE: 50 MMHG | SYSTOLIC BLOOD PRESSURE: 89 MMHG

## 2020-08-17 VITALS — SYSTOLIC BLOOD PRESSURE: 94 MMHG | DIASTOLIC BLOOD PRESSURE: 48 MMHG

## 2020-08-17 VITALS — DIASTOLIC BLOOD PRESSURE: 45 MMHG | SYSTOLIC BLOOD PRESSURE: 102 MMHG

## 2020-08-17 VITALS — SYSTOLIC BLOOD PRESSURE: 94 MMHG | DIASTOLIC BLOOD PRESSURE: 67 MMHG

## 2020-08-17 VITALS — DIASTOLIC BLOOD PRESSURE: 55 MMHG | SYSTOLIC BLOOD PRESSURE: 105 MMHG

## 2020-08-17 VITALS — DIASTOLIC BLOOD PRESSURE: 46 MMHG | SYSTOLIC BLOOD PRESSURE: 82 MMHG

## 2020-08-17 VITALS — DIASTOLIC BLOOD PRESSURE: 60 MMHG | SYSTOLIC BLOOD PRESSURE: 109 MMHG

## 2020-08-17 LAB
% BANDS: 52 % (ref 0–9)
% LYMPHS: 6 % (ref 24–48)
% MONOS: 4 % (ref 0–10)
% SEGS: 38 % (ref 35–66)
ALBUMIN SERPL-MCNC: 1.9 G/DL (ref 3.4–5)
ALBUMIN SERPL-MCNC: 2 G/DL (ref 3.4–5)
ALBUMIN/GLOB SERPL: 0.7 {RATIO} (ref 1–1.7)
ALBUMIN/GLOB SERPL: 0.8 {RATIO} (ref 1–1.7)
ALP SERPL-CCNC: 282 U/L (ref 46–116)
ALP SERPL-CCNC: 338 U/L (ref 46–116)
ALT SERPL-CCNC: 13 U/L (ref 14–59)
ALT SERPL-CCNC: 9 U/L (ref 14–59)
ANION GAP SERPL CALC-SCNC: 20 MMOL/L (ref 6–14)
ANION GAP SERPL CALC-SCNC: 21 MMOL/L (ref 6–14)
AST SERPL-CCNC: 30 U/L (ref 15–37)
AST SERPL-CCNC: 34 U/L (ref 15–37)
BASE EXCESS ABG: -19 MMOL/L (ref -3–3)
BASOPHILS # BLD AUTO: 0 X10^3/UL (ref 0–0.2)
BASOPHILS # BLD AUTO: 0 X10^3/UL (ref 0–0.2)
BASOPHILS NFR BLD: 0 % (ref 0–3)
BASOPHILS NFR BLD: 0 % (ref 0–3)
BILIRUB SERPL-MCNC: 0.7 MG/DL (ref 0.2–1)
BILIRUB SERPL-MCNC: 1.3 MG/DL (ref 0.2–1)
BUN SERPL-MCNC: 56 MG/DL (ref 7–20)
BUN SERPL-MCNC: 58 MG/DL (ref 7–20)
BUN/CREAT SERPL: 22 (ref 6–20)
BUN/CREAT SERPL: 23 (ref 6–20)
CALCIUM SERPL-MCNC: 7.3 MG/DL (ref 8.5–10.1)
CALCIUM SERPL-MCNC: 7.8 MG/DL (ref 8.5–10.1)
CHLORIDE SERPL-SCNC: 118 MMOL/L (ref 98–107)
CHLORIDE SERPL-SCNC: 119 MMOL/L (ref 98–107)
CO2 SERPL-SCNC: 14 MMOL/L (ref 21–32)
CO2 SERPL-SCNC: 15 MMOL/L (ref 21–32)
CREAT SERPL-MCNC: 2.5 MG/DL (ref 0.6–1)
CREAT SERPL-MCNC: 2.5 MG/DL (ref 0.6–1)
DOHLE BOD BLD QL SMEAR: (no result)
EOSINOPHIL NFR BLD: 0 % (ref 0–3)
EOSINOPHIL NFR BLD: 0 % (ref 0–3)
EOSINOPHIL NFR BLD: 0 X10^3/UL (ref 0–0.7)
EOSINOPHIL NFR BLD: 0 X10^3/UL (ref 0–0.7)
ERYTHROCYTE [DISTWIDTH] IN BLOOD BY AUTOMATED COUNT: 17.5 % (ref 11.5–14.5)
ERYTHROCYTE [DISTWIDTH] IN BLOOD BY AUTOMATED COUNT: 17.9 % (ref 11.5–14.5)
GFR SERPLBLD BASED ON 1.73 SQ M-ARVRAT: 20.5 ML/MIN
GFR SERPLBLD BASED ON 1.73 SQ M-ARVRAT: 20.5 ML/MIN
GLOBULIN SER-MCNC: 2.4 G/DL (ref 2.2–3.8)
GLOBULIN SER-MCNC: 2.8 G/DL (ref 2.2–3.8)
GLUCOSE SERPL-MCNC: 100 MG/DL (ref 70–99)
GLUCOSE SERPL-MCNC: 76 MG/DL (ref 70–99)
HCO3 BLDA-SCNC: 7 MMOL/L (ref 21–28)
HCT VFR BLD CALC: 20.4 % (ref 36–47)
HCT VFR BLD CALC: 26.3 % (ref 36–47)
HGB BLD-MCNC: 6.4 G/DL (ref 12–15.5)
HGB BLD-MCNC: 8 G/DL (ref 12–15.5)
INSPIRATION SETTING TIME VENT: 40
LYMPHOCYTES # BLD: 1 X10^3/UL (ref 1–4.8)
LYMPHOCYTES # BLD: 1.2 X10^3/UL (ref 1–4.8)
LYMPHOCYTES NFR BLD AUTO: 3 % (ref 24–48)
LYMPHOCYTES NFR BLD AUTO: 4 % (ref 24–48)
MAGNESIUM SERPL-MCNC: 1.8 MG/DL (ref 1.8–2.4)
MCH RBC QN AUTO: 28 PG (ref 25–35)
MCH RBC QN AUTO: 28 PG (ref 25–35)
MCHC RBC AUTO-ENTMCNC: 30 G/DL (ref 31–37)
MCHC RBC AUTO-ENTMCNC: 31 G/DL (ref 31–37)
MCV RBC AUTO: 89 FL (ref 79–100)
MCV RBC AUTO: 92 FL (ref 79–100)
MONO #: 1.1 X10^3/UL (ref 0–1.1)
MONO #: 1.1 X10^3/UL (ref 0–1.1)
MONOCYTES NFR BLD: 3 % (ref 0–9)
MONOCYTES NFR BLD: 3 % (ref 0–9)
NEUT #: 30.7 X10^3/UL (ref 1.8–7.7)
NEUT #: 36.9 X10^3/UL (ref 1.8–7.7)
NEUTROPHILS NFR BLD AUTO: 93 % (ref 31–73)
NEUTROPHILS NFR BLD AUTO: 94 % (ref 31–73)
NRBC # BLD MANUAL: 2 10*3/UL
PCO2 BLDA: 18 MMHG (ref 35–46)
PCO2 TEMP ADJ BLD: 19 MMHG
PH TEMP ADJ BLD: 7.21 [PH]
PHOSPHATE SERPL-MCNC: 2.4 MG/DL (ref 2.6–4.7)
PLATELET # BLD AUTO: 239 X10^3/UL (ref 140–400)
PLATELET # BLD AUTO: 280 X10^3/UL (ref 140–400)
PLATELET # BLD EST: ADEQUATE 10*3/UL
PO2 BLDA: 107 MMHG (ref 75–108)
PO2 TEMP ADJ BLD: 112 MMHG
POTASSIUM SERPL-SCNC: 3.6 MMOL/L (ref 3.5–5.1)
POTASSIUM SERPL-SCNC: 3.7 MMOL/L (ref 3.5–5.1)
PROT SERPL-MCNC: 4.3 G/DL (ref 6.4–8.2)
PROT SERPL-MCNC: 4.8 G/DL (ref 6.4–8.2)
PROTHROMBIN TIME: 27.9 SEC (ref 11.7–14)
RBC # BLD AUTO: 2.28 X10^6/UL (ref 3.5–5.4)
RBC # BLD AUTO: 2.87 X10^6/UL (ref 3.5–5.4)
SAO2 % BLDA: 97 % (ref 92–99)
SODIUM SERPL-SCNC: 153 MMOL/L (ref 136–145)
SODIUM SERPL-SCNC: 154 MMOL/L (ref 136–145)
WBC # BLD AUTO: 33 X10^3/UL (ref 4–11)
WBC # BLD AUTO: 39.1 X10^3/UL (ref 4–11)

## 2020-08-17 RX ADMIN — SODIUM CHLORIDE, SODIUM LACTATE, POTASSIUM CHLORIDE, AND CALCIUM CHLORIDE SCH MLS/HR: .6; .31; .03; .02 INJECTION, SOLUTION INTRAVENOUS at 01:40

## 2020-08-17 RX ADMIN — MEROPENEM SCH MLS/HR: 500 INJECTION, POWDER, FOR SOLUTION INTRAVENOUS at 12:54

## 2020-08-17 RX ADMIN — MEROPENEM SCH MLS/HR: 500 INJECTION, POWDER, FOR SOLUTION INTRAVENOUS at 00:10

## 2020-08-17 RX ADMIN — MULTIVITAMIN SCH ML: LIQUID ORAL at 08:29

## 2020-08-17 RX ADMIN — FENTANYL CITRATE PRN MCG: 50 INJECTION INTRAMUSCULAR; INTRAVENOUS at 11:51

## 2020-08-17 RX ADMIN — INSULIN LISPRO SCH UNITS: 100 INJECTION, SOLUTION INTRAVENOUS; SUBCUTANEOUS at 06:00

## 2020-08-17 RX ADMIN — IPRATROPIUM BROMIDE AND ALBUTEROL SULFATE SCH ML: .5; 3 SOLUTION RESPIRATORY (INHALATION) at 16:00

## 2020-08-17 RX ADMIN — INSULIN LISPRO SCH UNITS: 100 INJECTION, SOLUTION INTRAVENOUS; SUBCUTANEOUS at 12:00

## 2020-08-17 RX ADMIN — PANTOPRAZOLE SODIUM SCH MG: 40 INJECTION, POWDER, FOR SOLUTION INTRAVENOUS at 08:57

## 2020-08-17 RX ADMIN — SODIUM CHLORIDE, SODIUM LACTATE, POTASSIUM CHLORIDE, AND CALCIUM CHLORIDE SCH MLS/HR: .6; .31; .03; .02 INJECTION, SOLUTION INTRAVENOUS at 13:29

## 2020-08-17 RX ADMIN — IPRATROPIUM BROMIDE AND ALBUTEROL SULFATE SCH ML: .5; 3 SOLUTION RESPIRATORY (INHALATION) at 20:36

## 2020-08-17 RX ADMIN — ALBUMIN (HUMAN) SCH MLS/HR: 0.25 INJECTION, SOLUTION INTRAVENOUS at 06:05

## 2020-08-17 RX ADMIN — MEROPENEM SCH MLS/HR: 500 INJECTION, POWDER, FOR SOLUTION INTRAVENOUS at 06:05

## 2020-08-17 RX ADMIN — IPRATROPIUM BROMIDE AND ALBUTEROL SULFATE SCH ML: .5; 3 SOLUTION RESPIRATORY (INHALATION) at 12:21

## 2020-08-17 RX ADMIN — IPRATROPIUM BROMIDE AND ALBUTEROL SULFATE SCH ML: .5; 3 SOLUTION RESPIRATORY (INHALATION) at 08:19

## 2020-08-17 RX ADMIN — ACETYLCYSTEINE SCH MG: 200 INHALANT RESPIRATORY (INHALATION) at 08:00

## 2020-08-17 RX ADMIN — ACETYLCYSTEINE SCH MG: 200 INHALANT RESPIRATORY (INHALATION) at 20:36

## 2020-08-17 RX ADMIN — IPRATROPIUM BROMIDE AND ALBUTEROL SULFATE SCH ML: .5; 3 SOLUTION RESPIRATORY (INHALATION) at 03:50

## 2020-08-17 RX ADMIN — INSULIN LISPRO SCH UNITS: 100 INJECTION, SOLUTION INTRAVENOUS; SUBCUTANEOUS at 00:00

## 2020-08-17 RX ADMIN — ENOXAPARIN SODIUM SCH MG: 40 INJECTION SUBCUTANEOUS at 08:00

## 2020-08-17 NOTE — RAD
Single view of the chest. 8/17/2020 9:02 AM

 

Indication: Reason: Fever and increased 02 / Spl. Instructions:  / 

History: 

 

Comparison: Chest radiograph August 11, 2020

 

Discussion: Right upper extremity PICC line and tracheostomy tube are 

similar. Probable small left pleural effusion with underlying atelectasis 

is similar. Linear opacities in the right hilum likely reflect 

atelectasis. Low lung volumes are noted.

 

 

Impression:

1. Stable tracheostomy tube and right upper extremity PICC line

 

2. New right perihilar opacity most likely reflecting atelectasis. 

Recommend short-term radiographic follow-up 

 

3.Low lung volumes. Small left pleural effusion, similar to comparison

 

Electronically signed by: Jerry Stratton MD (8/17/2020 4:02 PM) [id number]

## 2020-08-17 NOTE — PDOC
[facility] [facility] PROGRESS NOTE


Date of Service


DOS:


DATE: 8/17/20 


TIME: 13:09





Chief Complaint


Chief Complaint


S/P Exp. 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 

parapsilosis & PSAE 07/26 (I-merrem/ceftazidime/AZT/cefepime))


Leucocytosis 


Fevers, intermittent


Acute gallstone pancreatitis with persistent necrosis


Acute hypoxic Respiratory failure required mechanical ventilation


Tracheostomy 


Bilateral pleural effusions/pulm edema s/p Throacentesis on 6/15/2020


HTN 


Intractable pain


Intractable nausea


Covid 19 negative


Acute on chronic anemia 


Abd distention - U/S and CT reviewed s/p 0.4 L of opaque, debris-containing a

scites was removed 5/6


Gallstone pancreatitis with necrosis. 


   -CT A/P 6/6 showed multiple pseudocysts, slight larger on the right. s/p 

drains x 3, 6/7.  + PSAE (MDRO-R Cefepime, Zosyn ANSON < 64) and yeast, 


   -s/p drain 4/27. C. parapsilosis. s/p drain 5/6 + yeast & high amylase; s/p 

additional drain on 5/8. Drains removed. 


Ascites s/p paracentesis 4/15 & 5/6. C. parapsilosis 


JED. off HD. 


A large fluid collection in the pancreatic bed has slightly decreased in size, 

described below, the pancreas itself is difficult to  visualize, which could be 

due to necrosis or obscuration of pancreatic  parenchyma from the surrounding 

fluid collection.6/15 


- 4/27 status post ROBERT drain placement + C paropsilosis. s/p additional drains 

5/8


Hypocalcemia 


Prediabetes


s/p trach


Hyperglycemia


Severe protein-caloric malnutrition


Extensive retroperitoneal fluid collections persist. Percutaneous  drains remain

within the collections in both paracolic gutters. These  communicate with 

additional pelvic and peripancreatic collections.





History of Present Illness


History of Present Illness


8/17/2022


Patient seen and examined


We now have had to transfer her back to the ICU she has decompensated again


She is bleeding from 1 of her drains


Her INR is above 3 (on its own)


She is had a couple of rapid responses in the past 48 hours


Her DPOA is present (Yessica her daughter)


Her mother is also present


Discussed with RN


Chart reviewed


I had a long discussion with the family regarding possibly withdrawing care and 

they are considering their options








08/16/2020


Patient seen and examined, appears encephalopathic 


Patient is largely nonverbal 


Discussed with RN 


Chart reviewed 











8/15  Patient without complaints. Per nurse reports some hypotension this am 

that improved spontaneously.


8/14  Will check Hb/Hct levels today in response to prbc; patient largely 

nonverbal but denies pain


8/13,   increased IV fluid, cont current


bolusing daily ,  LR increased


cr better,    Hgb worse today, will give 1 u PRBC





8/12.   pain in legs,    BP better,  some better Urine outptu





8/11.   dyspnea and mild distress worsened this AM.  but was able to walk some 

with PT later.


still  sometimes,    








08/10/2020


Patient seen and examined


Afebrile


Resting in NAD


Tachycardic and tachypneic overnight


Recurring leukocytosis, 23.0


Chart reviewed


Discussed with RN





08/9: Afebrile.  Calcium down to 10.1.  Little more tachycardic today with some 

more secretions. No O2 requirements. Does not wish to wear her speaking valve. 

Will check CXR.


8/8: Afebrile, weak, having more secretions not wearing a speaking valve today. 

WBC 10, Hb 8.8, , and K3.7, BUN 12, CR 0.5, calcium down to 10.3.  After 

zoledronic acid


8/7: Had a rash after calcitonin injection.  Discussed with nephrology with her 

calcium moving from 11.3-10.9 will give IV bisphosphonate today, zoledronic 

acid.


8/6: Hb stable. Afebrile. Weak. Calcium increased. Shortness of breath is 

improving. Plan for calcitonin today, lasix, and saline


8/5: Hemoglobin abnormally low on repeat has been stable 7.2.  Calcium up to 

10.9.  She is able to work with PT, she is asking to eat.  Social work is been 

having difficulties with both of her daughters completing the financial aspect 

of disability paperwork which is been prolonging her stay over the past 5 

months.  Plan to check PTH and to treat hypercalcemia with 1 L normal saline 40 

mg of IV Lasix and repeat labs in a.m.


8/4: Not wishing to wear her speaking valve. J tube having some difficulties. 

Afebrile. Rolf bedside to aid her. transferred from ICU today


8/3: No overnight events. Calcium 10.7 on AM labs. She is still a bit slow to to

respond, but follows commands well. Does not want speaking valve with me. Pain 

is better controlled in her abdomen. Wound care to see today. Will check liver 

function and phos levels given her calcium elevation.


8/2: Patient seen and examined bedside.  Positive fluid balance in the past 24 

hours.  Patient's chart, labs, images were reviewed and discussed with RN


8/1: No acute events overnight.  Seen and examined at bedside.  Patient had a 

fever 100.2.  Negative fluid balance of 150 cc.  Patient's chart, labs, images 

were reviewed and discussed with RN


7/31: Patient seen and examined at bedside.  No acute events overnight.  

Positive fluid balance 633.  Patient's chart, labs, images were reviewed and 

discussed with RN


7/30: Patient seen and examined bedside.  No acute events overnight.  Patient's 

chart, labs, images were reviewed and discussed with RN


7/28: Patient seen and examined bedside.  Patient appears to be in no obvious 

pain.  All drains have been adequately draining.  Patient continues to be on 

TPN.  Chart labs and imaging was reviewed.  Negative fluid balance of 270 cc


7/27:Patient seen and examined in the ICU.  Patient still requires extensive 

care.  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  Chart, labs, imaging was

reviewed.  UOP with + 500cc balance.


7/25: Patient seen in and examined in the ICU. She is still extremely critically

ill. Appears weak, frail, and pale. Better color today with improved eye contact

and expression. Discussed with RN. Chart reviewed


7/21: Patient seen and examined in the ICU, She is still extremely critically 

ill, Appears extremely weak frail and pale, Discussed with RN, Chart reviewed





Vitals/I&O


Vitals/I&O:





                                   Vital Signs








  Date Time  Temp Pulse Resp B/P (MAP) Pulse Ox O2 Delivery O2 Flow Rate FiO2


 


8/17/20 12:55     100 Nasal Cannula 2.0 


 


8/17/20 11:51   20     


 


8/17/20 11:00  127  94/67 (76)    


 


8/17/20 07:24 98.6       





 98.6       














                                    I & O   


 


 8/16/20 8/16/20 8/17/20





 15:00 23:00 07:00


 


Intake Total 200 ml 350 ml 1585 ml


 


Output Total  2075 ml 1400 ml


 


Balance 200 ml -1725 ml 185 ml











Physical Exam


Physical Exam:


GENERAL: Propped up in bed, her eyes are wide open, upward gaze, unresponsive to

verbal, + visual threat 


HEENT: Pupils equal, oral cavity dry. 


NECK:  Tracheostomy 


LUNGS: Diminished aeration bases,


HEART:  S1, S2, 


ABDOMEN: Distended, bowel sounds hypoactive, soft, GJ drain present, drainage 

bag in place - bloody drainage, + rectal tube 


: [place] in place 


EXTREMITIES: Generalized edema,


SKIN: warm touch. 


NEURO: - Eyes are open, unresponsive to verbal and tactile stimuli 


RUE  PICC site without signs of complications. PIV s clean


General:  Other (chronic ill, opens eyes)


Heart:  Normal S1, Normal S2, Other


Lungs:  Clear


Abdomen:  Soft, Other (wound with clots present)


Extremities:  No clubbing, No cyanosis, Other (Diffuse edema)


Skin:  No breakdown





Labs


Labs:





Laboratory Tests








Test


 8/16/20


18:09 8/16/20


23:05 8/16/20


23:30 8/17/20


00:13


 


Glucose (Fingerstick)


 171 mg/dL


(70-99) 


 


 109 mg/dL


(70-99)


 


O2 Saturation  97 % (92-99)   


 


Arterial Blood pH


 


 7.22


(7.35-7.45) 


 





 


Arterial Blood pH (Temp


corrected) 


 7.21 


 


 





 


Arterial Blood pCO2 at


Patient Temp 


 18 mmHg


(35-46) 


 





 


Arterial Blood pCO2 (Temp


correct) 


 19 mmHg 


 


 





 


Arterial Blood pO2 at Patient


Temp 


 107 mmHg


() 


 





 


Arterial Blood pO2 (Temp


corrected) 


 112 mmHg 


 


 





 


Arterial Blood HCO3


 


 7 mmol/L


(21-28) 


 





 


Arterial Blood Base Excess


 


 -19 mmol/L


(-3-3) 


 





 


FiO2  40   


 


Hemoglobin


 


 


 6.3 g/dL


(12.0-15.5) 





 


Hematocrit


 


 


 21.3 %


(36.0-47.0) 





 


Mean Corpuscular Hemoglobin


Concent 


 


 30 g/dL


(31-37) 





 


Prothrombin Time


 


 


 34.6 SEC


(11.7-14.0) 





 


Prothromb Time International


Ratio 


 


 3.4 (0.8-1.1) 


 





 


Test


 8/17/20


04:01 8/17/20


06:10 8/17/20


06:20 8/17/20


12:12


 


Glucose (Fingerstick)


 107 mg/dL


(70-99) 93 mg/dL


(70-99) 


 





 


White Blood Count


 


 


 39.1 x10^3/uL


(4.0-11.0) 33.0 x10^3/uL


(4.0-11.0)


 


Red Blood Count


 


 


 2.87 x10^6/uL


(3.50-5.40) 2.28 x10^6/uL


(3.50-5.40)


 


Hemoglobin


 


 


 8.0 g/dL


(12.0-15.5) 6.4 g/dL


(12.0-15.5)


 


Hematocrit


 


 


 26.3 %


(36.0-47.0) 20.4 %


(36.0-47.0)


 


Mean Corpuscular Volume   92 fL ()  89 fL () 


 


Mean Corpuscular Hemoglobin   28 pg (25-35)  28 pg (25-35) 


 


Mean Corpuscular Hemoglobin


Concent 


 


 30 g/dL


(31-37) 31 g/dL


(31-37)


 


Red Cell Distribution Width


 


 


 17.9 %


(11.5-14.5) 17.5 %


(11.5-14.5)


 


Platelet Count


 


 


 280 x10^3/uL


(140-400) 239 x10^3/uL


(140-400)


 


Neutrophils (%) (Auto)   94 % (31-73)  93 % (31-73) 


 


Lymphocytes (%) (Auto)   3 % (24-48)  4 % (24-48) 


 


Monocytes (%) (Auto)   3 % (0-9)  3 % (0-9) 


 


Eosinophils (%) (Auto)   0 % (0-3)  0 % (0-3) 


 


Basophils (%) (Auto)   0 % (0-3)  0 % (0-3) 


 


Neutrophils # (Auto)


 


 


 36.9 x10^3/uL


(1.8-7.7) 30.7 x10^3/uL


(1.8-7.7)


 


Lymphocytes # (Auto)


 


 


 1.0 x10^3/uL


(1.0-4.8) 1.2 x10^3/uL


(1.0-4.8)


 


Monocytes # (Auto)


 


 


 1.1 x10^3/uL


(0.0-1.1) 1.1 x10^3/uL


(0.0-1.1)


 


Eosinophils # (Auto)


 


 


 0.0 x10^3/uL


(0.0-0.7) 0.0 x10^3/uL


(0.0-0.7)


 


Basophils # (Auto)


 


 


 0.0 x10^3/uL


(0.0-0.2) 0.0 x10^3/uL


(0.0-0.2)


 


Prothrombin Time


 


 


 27.9 SEC


(11.7-14.0) 





 


Prothromb Time International


Ratio 


 


 2.6 (0.8-1.1) 


 





 


Sodium Level


 


 


 154 mmol/L


(136-145) 153 mmol/L


(136-145)


 


Potassium Level


 


 


 3.6 mmol/L


(3.5-5.1) 3.7 mmol/L


(3.5-5.1)


 


Chloride Level


 


 


 119 mmol/L


() 118 mmol/L


()


 


Carbon Dioxide Level


 


 


 14 mmol/L


(21-32) 15 mmol/L


(21-32)


 


Anion Gap   21 (6-14)  20 (6-14) 


 


Blood Urea Nitrogen


 


 


 58 mg/dL


(7-20) 56 mg/dL


(7-20)


 


Creatinine


 


 


 2.5 mg/dL


(0.6-1.0) 2.5 mg/dL


(0.6-1.0)


 


Estimated GFR


(Cockcroft-Gault) 


 


 20.5 


 20.5 





 


BUN/Creatinine Ratio   23 (6-20)  22 (6-20) 


 


Glucose Level


 


 


 100 mg/dL


(70-99) 76 mg/dL


(70-99)


 


Calcium Level


 


 


 7.8 mg/dL


(8.5-10.1) 7.3 mg/dL


(8.5-10.1)


 


Total Bilirubin


 


 


 0.7 mg/dL


(0.2-1.0) 1.3 mg/dL


(0.2-1.0)


 


Aspartate Amino Transf


(AST/SGOT) 


 


 30 U/L (15-37) 


 34 U/L (15-37) 





 


Alanine Aminotransferase


(ALT/SGPT) 


 


 13 U/L (14-59) 


 9 U/L (14-59) 





 


Alkaline Phosphatase


 


 


 338 U/L


() 282 U/L


()


 


Total Protein


 


 


 4.8 g/dL


(6.4-8.2) 4.3 g/dL


(6.4-8.2)


 


Albumin


 


 


 2.0 g/dL


(3.4-5.0) 1.9 g/dL


(3.4-5.0)


 


Albumin/Globulin Ratio   0.7 (1.0-1.7)  0.8 (1.0-1.7) 


 


Phosphorus Level


 


 


 


 2.4 mg/dL


(2.6-4.7)


 


Magnesium Level


 


 


 


 1.8 mg/dL


(1.8-2.4)











Assessment and Plan


Assessmemt and Plan


Problems


Medical Problems:


(1) Acute pancreatitis


Status: Acute  





(2) Cholelithiasis


Status: Acute  





S/P Exp. 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 

parapsilosis & PSAE 07/26 (I-merrem/ceftazidime/AZT/cefepime))


Leucocytosis 


Fevers, intermittent


Acute gallstone pancreatitis with persistent necrosis


Acute hypoxic Respiratory failure required mechanical ventilation


Tracheostomy 


Bilateral pleural effusions/pulm edema s/p Throacentesis on 6/15/2020


HTN 


Intractable pain


Intractable nausea


Covid 19 negative


Acute on chronic anemia 


Abd distention - U/S and CT reviewed s/p 0.4 L of opaque, debris-containing 

ascites was removed 5/6


Gallstone pancreatitis with necrosis. 


   -CT A/P 6/6 showed multiple pseudocysts, slight larger on the right. s/p 

drains x 3, 6/7.  + PSAE (MDRO-R Cefepime, Zosyn ANSON < 64) and yeast, 


   -s/p drain 4/27. C. parapsilosis. s/p drain 5/6 + yeast & high amylase; s/p 

additional drain on 5/8. Drains removed. 


Ascites s/p paracentesis 4/15 & 5/6. C. parapsilosis 


JED. off HD. 


A large fluid collection in the pancreatic bed has slightly decreased in size, 

described below, the pancreas itself is difficult to  visualize, which could be 

due to necrosis or obscuration of pancreatic  parenchyma from the surrounding 

fluid collection.6/15 


- 4/27 status post ROBERT drain placement + C paropsilosis. s/p additional drains 5/ 8


Hypocalcemia 


Prediabetes


s/p trach


Hyperglycemia


Severe protein-caloric malnutrition


Extensive retroperitoneal fluid collections persist. Percutaneous  drains remain

within the collections in both paracolic gutters. These  communicate with 

additional pelvic and peripancreatic collection





PLAN 


The family is considering moving towards comfort care


For now continue the following:


Transfuse as needed


I gave a unit of FFP


I gave some vitamin K


ICU monitoring


Trend labs 


Continue current care


DVT prophylaxis  


Full code 


Subspecialty input appreciated 


Prognosis is most likely terminal and I explained this to the family and they 

understand I suspect they will be going with comfort care soon


Total time 32





Comment


Review of Relevant


I have reviewed the following items josy (where applicable) has been applied.


Medications:





Current Medications








 Medications


  (Trade)  Dose


 Ordered  Sig/Yee


 Route


 PRN Reason  Start Time


 Stop Time Status Last Admin


Dose Admin


 


 Meropenem 500 mg/


 Sodium Chloride  50 ml @ 


 100 mls/hr  Q6HRS


 IV


   8/16/20 16:00


    8/17/20 12:54





 


 Daptomycin 480 mg/


 Sodium Chloride  50 ml @ 


 100 mls/hr  Q24H


 IV


   8/16/20 16:00


    8/16/20 18:02





 


 Micafungin Sodium


 100 mg/Dextrose  100 ml @ 


 100 mls/hr  Q24H


 IV


   8/16/20 15:00


    8/16/20 15:29





 


 Phytonadione


  (Vitamin K


 Ampule)  10 mg  1X  ONCE


 SQ


   8/17/20 02:00


 8/17/20 02:01 DC 8/17/20 01:44





 


 Sodium


 Bicarbonate 50


 meq/Sodium


 Chloride  1,050 ml @ 


 75 mls/hr  1X  ONCE


 IV


   8/17/20 02:00


 8/17/20 15:59  8/17/20 01:42





 


 Sodium Bicarbonate


  (Sodium Bicarb


 Adult 8.4% Syr)  50 meq  1X  ONCE


 IV


   8/17/20 04:15


 8/17/20 04:16 DC 8/17/20 04:14





 


 Fluconazole/


 Sodium Chloride  50 ml @ 


 100 mls/hr  ONCE  ONCE


 IV


   8/17/20 10:15


 8/17/20 10:44 DC 8/17/20 11:08














Justicifation of Admission Dx:


Justifications for Admission:


Justification of Admission Dx:  Yes











HECTOR MASON III DO           Aug 17, 2020 13:11

## 2020-08-17 NOTE — NUR
This RN assisted response to rapid called by the patient's RN.  This RN assisted RN Any 
with pulling, scanning, and administering medication ordered by primary MD, taking vital 
signs to establish trending, and blood and blood product administration.  Communication with 
House Supervisor in regards this patient and possible ICU transfer.  RT Lucas and Gisel also 
responded to bedside.

## 2020-08-17 NOTE — PDOC
SAURAV NASH APRN 8/17/20 1015:


SURGICAL PROGRESS NOTE


DATE: 8/17/20 


TIME: 10:12


Subjective


seen in ICU


d/w nursing


Vital Signs





Vital Signs








  Date Time  Temp Pulse Resp B/P (MAP) Pulse Ox O2 Delivery O2 Flow Rate FiO2


 


8/17/20 08:30     100 Nasal Cannula 2.0 


 


8/17/20 07:24 98.6 133 34 89/50 (63)    





 98.6       








I&O











Intake and Output 


 


 8/17/20





 07:00


 


Intake Total 2135 ml


 


Output Total 3475 ml


 


Balance -1340 ml


 


 


 


Intake Oral 0 ml


 


IV Total 385 ml


 


Tube Feeding 600 ml


 


Blood Product IV Normal Saline Flush 1150 ml


 


Output Urine Total 200 ml


 


Stool Total 1450 ml


 


Drainage Total 1825 ml








General:  Other (chronic ill, opens eyes)


Abdomen:  Soft, Other (wound with clots present)


Labs





Laboratory Tests








Test


 8/15/20


12:38 8/15/20


16:46 8/16/20


00:15 8/16/20


05:20


 


Glucose (Fingerstick)


 167 mg/dL


(70-99) 169 mg/dL


(70-99) 132 mg/dL


(70-99) 





 


White Blood Count


 


 


 


 16.5 x10^3/uL


(4.0-11.0)


 


Red Blood Count


 


 


 


 2.97 x10^6/uL


(3.50-5.40)


 


Hemoglobin


 


 


 


 8.2 g/dL


(12.0-15.5)


 


Hematocrit


 


 


 


 26.4 %


(36.0-47.0)


 


Mean Corpuscular Volume    89 fL () 


 


Mean Corpuscular Hemoglobin    28 pg (25-35) 


 


Mean Corpuscular Hemoglobin


Concent 


 


 


 31 g/dL


(31-37)


 


Red Cell Distribution Width


 


 


 


 20.4 %


(11.5-14.5)


 


Platelet Count


 


 


 


 359 x10^3/uL


(140-400)


 


Neutrophils (%) (Auto)    81 % (31-73) 


 


Lymphocytes (%) (Auto)    12 % (24-48) 


 


Monocytes (%) (Auto)    7 % (0-9) 


 


Eosinophils (%) (Auto)    0 % (0-3) 


 


Basophils (%) (Auto)    0 % (0-3) 


 


Neutrophils # (Auto)


 


 


 


 13.3 x10^3/uL


(1.8-7.7)


 


Lymphocytes # (Auto)


 


 


 


 2.0 x10^3/uL


(1.0-4.8)


 


Monocytes # (Auto)


 


 


 


 1.1 x10^3/uL


(0.0-1.1)


 


Eosinophils # (Auto)


 


 


 


 0.1 x10^3/uL


(0.0-0.7)


 


Basophils # (Auto)


 


 


 


 0.0 x10^3/uL


(0.0-0.2)


 


Segmented Neutrophils %    56 % (35-66) 


 


Band Neutrophils %    26 % (0-9) 


 


Lymphocytes %    12 % (24-48) 


 


Monocytes %    4 % (0-10) 


 


Eosinophils %    1 % (0-5) 


 


Myelocytes %    1 % (0-0) 


 


Nucleated Red Blood Cells    1 


 


Toxic Granulation    Slight 


 


Platelet Estimate


 


 


 


 Adequate


(ADEQUATE)


 


Polychromasia    Present 


 


Poikilocytosis    Present 


 


Anisocytosis    Present 


 


Sodium Level


 


 


 


 152 mmol/L


(136-145)


 


Potassium Level


 


 


 


 3.2 mmol/L


(3.5-5.1)


 


Chloride Level


 


 


 


 120 mmol/L


()


 


Carbon Dioxide Level


 


 


 


 18 mmol/L


(21-32)


 


Anion Gap    14 (6-14) 


 


Blood Urea Nitrogen


 


 


 


 58 mg/dL


(7-20)


 


Creatinine


 


 


 


 2.1 mg/dL


(0.6-1.0)


 


Estimated GFR


(Cockcroft-Gault) 


 


 


 25.0 





 


Glucose Level


 


 


 


 157 mg/dL


(70-99)


 


Calcium Level


 


 


 


 7.4 mg/dL


(8.5-10.1)


 


Test


 8/16/20


11:47 8/16/20


18:09 8/16/20


23:05 8/16/20


23:30


 


Glucose (Fingerstick)


 195 mg/dL


(70-99) 171 mg/dL


(70-99) 


 





 


O2 Saturation   97 % (92-99)  


 


Arterial Blood pH


 


 


 7.22


(7.35-7.45) 





 


Arterial Blood pH (Temp


corrected) 


 


 7.21 


 





 


Arterial Blood pCO2 at


Patient Temp 


 


 18 mmHg


(35-46) 





 


Arterial Blood pCO2 (Temp


correct) 


 


 19 mmHg 


 





 


Arterial Blood pO2 at Patient


Temp 


 


 107 mmHg


() 





 


Arterial Blood pO2 (Temp


corrected) 


 


 112 mmHg 


 





 


Arterial Blood HCO3


 


 


 7 mmol/L


(21-28) 





 


Arterial Blood Base Excess


 


 


 -19 mmol/L


(-3-3) 





 


FiO2   40  


 


Hemoglobin


 


 


 


 6.3 g/dL


(12.0-15.5)


 


Hematocrit


 


 


 


 21.3 %


(36.0-47.0)


 


Mean Corpuscular Hemoglobin


Concent 


 


 


 30 g/dL


(31-37)


 


Prothrombin Time


 


 


 


 34.6 SEC


(11.7-14.0)


 


Prothromb Time International


Ratio 


 


 


 3.4 (0.8-1.1) 





 


Test


 8/17/20


00:13 8/17/20


04:01 8/17/20


06:10 8/17/20


06:20


 


Glucose (Fingerstick)


 109 mg/dL


(70-99) 107 mg/dL


(70-99) 93 mg/dL


(70-99) 





 


Prothrombin Time


 


 


 


 27.9 SEC


(11.7-14.0)


 


Prothromb Time International


Ratio 


 


 


 2.6 (0.8-1.1) 





 


Sodium Level


 


 


 


 154 mmol/L


(136-145)


 


Potassium Level


 


 


 


 3.6 mmol/L


(3.5-5.1)


 


Chloride Level


 


 


 


 119 mmol/L


()


 


Carbon Dioxide Level


 


 


 


 14 mmol/L


(21-32)


 


Anion Gap    21 (6-14) 


 


Blood Urea Nitrogen


 


 


 


 58 mg/dL


(7-20)


 


Creatinine


 


 


 


 2.5 mg/dL


(0.6-1.0)


 


Estimated GFR


(Cockcroft-Gault) 


 


 


 20.5 





 


BUN/Creatinine Ratio    23 (6-20) 


 


Glucose Level


 


 


 


 100 mg/dL


(70-99)


 


Calcium Level


 


 


 


 7.8 mg/dL


(8.5-10.1)


 


Total Bilirubin


 


 


 


 0.7 mg/dL


(0.2-1.0)


 


Aspartate Amino Transf


(AST/SGOT) 


 


 


 30 U/L (15-37) 





 


Alanine Aminotransferase


(ALT/SGPT) 


 


 


 13 U/L (14-59) 





 


Alkaline Phosphatase


 


 


 


 338 U/L


()


 


Total Protein


 


 


 


 4.8 g/dL


(6.4-8.2)


 


Albumin


 


 


 


 2.0 g/dL


(3.4-5.0)


 


Albumin/Globulin Ratio    0.7 (1.0-1.7) 








Laboratory Tests








Test


 8/16/20


11:47 8/16/20


18:09 8/16/20


23:05 8/16/20


23:30


 


Glucose (Fingerstick)


 195 mg/dL


(70-99) 171 mg/dL


(70-99) 


 





 


O2 Saturation   97 % (92-99)  


 


Arterial Blood pH


 


 


 7.22


(7.35-7.45) 





 


Arterial Blood pH (Temp


corrected) 


 


 7.21 


 





 


Arterial Blood pCO2 at


Patient Temp 


 


 18 mmHg


(35-46) 





 


Arterial Blood pCO2 (Temp


correct) 


 


 19 mmHg 


 





 


Arterial Blood pO2 at Patient


Temp 


 


 107 mmHg


() 





 


Arterial Blood pO2 (Temp


corrected) 


 


 112 mmHg 


 





 


Arterial Blood HCO3


 


 


 7 mmol/L


(21-28) 





 


Arterial Blood Base Excess


 


 


 -19 mmol/L


(-3-3) 





 


FiO2   40  


 


Hemoglobin


 


 


 


 6.3 g/dL


(12.0-15.5)


 


Hematocrit


 


 


 


 21.3 %


(36.0-47.0)


 


Mean Corpuscular Hemoglobin


Concent 


 


 


 30 g/dL


(31-37)


 


Prothrombin Time


 


 


 


 34.6 SEC


(11.7-14.0)


 


Prothromb Time International


Ratio 


 


 


 3.4 (0.8-1.1) 





 


Test


 8/17/20


00:13 8/17/20


04:01 8/17/20


06:10 8/17/20


06:20


 


Glucose (Fingerstick)


 109 mg/dL


(70-99) 107 mg/dL


(70-99) 93 mg/dL


(70-99) 





 


Prothrombin Time


 


 


 


 27.9 SEC


(11.7-14.0)


 


Prothromb Time International


Ratio 


 


 


 2.6 (0.8-1.1) 





 


Sodium Level


 


 


 


 154 mmol/L


(136-145)


 


Potassium Level


 


 


 


 3.6 mmol/L


(3.5-5.1)


 


Chloride Level


 


 


 


 119 mmol/L


()


 


Carbon Dioxide Level


 


 


 


 14 mmol/L


(21-32)


 


Anion Gap    21 (6-14) 


 


Blood Urea Nitrogen


 


 


 


 58 mg/dL


(7-20)


 


Creatinine


 


 


 


 2.5 mg/dL


(0.6-1.0)


 


Estimated GFR


(Cockcroft-Gault) 


 


 


 20.5 





 


BUN/Creatinine Ratio    23 (6-20) 


 


Glucose Level


 


 


 


 100 mg/dL


(70-99)


 


Calcium Level


 


 


 


 7.8 mg/dL


(8.5-10.1)


 


Total Bilirubin


 


 


 


 0.7 mg/dL


(0.2-1.0)


 


Aspartate Amino Transf


(AST/SGOT) 


 


 


 30 U/L (15-37) 





 


Alanine Aminotransferase


(ALT/SGPT) 


 


 


 13 U/L (14-59) 





 


Alkaline Phosphatase


 


 


 


 338 U/L


()


 


Total Protein


 


 


 


 4.8 g/dL


(6.4-8.2)


 


Albumin


 


 


 


 2.0 g/dL


(3.4-5.0)


 


Albumin/Globulin Ratio    0.7 (1.0-1.7) 








Problem List


Problems


Medical Problems:


(1) Acute pancreatitis


Status: Acute  





(2) Cholelithiasis


Status: Acute  








Assessment/Plan


INR needs corrected


CT reviewed


will review with Dr Medina


I did speak with Beth(daughter)





Justicifation of Admission Dx:


Justifications for Admission:


Justification of Admission Dx:  Yes





OVIDIO MEDINA MD 8/17/20 1203:


SURGICAL PROGRESS NOTE


Assessment/Plan


Retroperitoneal hematoma, likely due to coagulopathy. Agree with Korey's 

assessment and plan for correction of INR.











SAURAV NASH            Aug 17, 2020 10:15


OVIDIO MEDINA MD             Aug 17, 2020 12:03

## 2020-08-17 NOTE — PDOC
PULMONARY PROGRESS NOTES


DATE: 8/17/20 


TIME: 17:29


Subjective


Pt.transfer to ICU due to bleeding from abdominal drain sites


met acidosis, on 2 liters N/C 


Trach in place 


Low HGB


Vitals





Vital Signs








  Date Time  Temp Pulse Resp B/P (MAP) Pulse Ox O2 Delivery O2 Flow Rate FiO2


 


8/17/20 15:09  129 40 100/55 (70) 77 Nasal Cannula 2.0 


 


8/17/20 14:31 99.2       





 99.2       








ROS:  No Nausea, No Chest Pain, No Abdominal Pain, No Increase Cough


General:  No acute distress, Lethargic


Lungs:  Clear


Cardiovascular:  S1, S2


Abdomen:  Soft, Non-tender, Other (multiple ROBERT drains )


Extremities:  Other (+1 BLE edema)


Skin:  Warm


Labs





Laboratory Tests








Test


 8/16/20


00:15 8/16/20


05:20 8/16/20


11:47 8/16/20


18:09


 


Glucose (Fingerstick)


 132 mg/dL


(70-99) 


 195 mg/dL


(70-99) 171 mg/dL


(70-99)


 


White Blood Count


 


 16.5 x10^3/uL


(4.0-11.0) 


 





 


Red Blood Count


 


 2.97 x10^6/uL


(3.50-5.40) 


 





 


Hemoglobin


 


 8.2 g/dL


(12.0-15.5) 


 





 


Hematocrit


 


 26.4 %


(36.0-47.0) 


 





 


Mean Corpuscular Volume  89 fL ()   


 


Mean Corpuscular Hemoglobin  28 pg (25-35)   


 


Mean Corpuscular Hemoglobin


Concent 


 31 g/dL


(31-37) 


 





 


Red Cell Distribution Width


 


 20.4 %


(11.5-14.5) 


 





 


Platelet Count


 


 359 x10^3/uL


(140-400) 


 





 


Neutrophils (%) (Auto)  81 % (31-73)   


 


Lymphocytes (%) (Auto)  12 % (24-48)   


 


Monocytes (%) (Auto)  7 % (0-9)   


 


Eosinophils (%) (Auto)  0 % (0-3)   


 


Basophils (%) (Auto)  0 % (0-3)   


 


Neutrophils # (Auto)


 


 13.3 x10^3/uL


(1.8-7.7) 


 





 


Lymphocytes # (Auto)


 


 2.0 x10^3/uL


(1.0-4.8) 


 





 


Monocytes # (Auto)


 


 1.1 x10^3/uL


(0.0-1.1) 


 





 


Eosinophils # (Auto)


 


 0.1 x10^3/uL


(0.0-0.7) 


 





 


Basophils # (Auto)


 


 0.0 x10^3/uL


(0.0-0.2) 


 





 


Segmented Neutrophils %  56 % (35-66)   


 


Band Neutrophils %  26 % (0-9)   


 


Lymphocytes %  12 % (24-48)   


 


Monocytes %  4 % (0-10)   


 


Eosinophils %  1 % (0-5)   


 


Myelocytes %  1 % (0-0)   


 


Nucleated Red Blood Cells  1   


 


Toxic Granulation  Slight   


 


Platelet Estimate


 


 Adequate


(ADEQUATE) 


 





 


Polychromasia  Present   


 


Poikilocytosis  Present   


 


Anisocytosis  Present   


 


Sodium Level


 


 152 mmol/L


(136-145) 


 





 


Potassium Level


 


 3.2 mmol/L


(3.5-5.1) 


 





 


Chloride Level


 


 120 mmol/L


() 


 





 


Carbon Dioxide Level


 


 18 mmol/L


(21-32) 


 





 


Anion Gap  14 (6-14)   


 


Blood Urea Nitrogen


 


 58 mg/dL


(7-20) 


 





 


Creatinine


 


 2.1 mg/dL


(0.6-1.0) 


 





 


Estimated GFR


(Cockcroft-Gault) 


 25.0 


 


 





 


Glucose Level


 


 157 mg/dL


(70-99) 


 





 


Calcium Level


 


 7.4 mg/dL


(8.5-10.1) 


 





 


Test


 8/16/20


23:05 8/16/20


23:30 8/17/20


00:13 8/17/20


04:01


 


O2 Saturation 97 % (92-99)    


 


Arterial Blood pH


 7.22


(7.35-7.45) 


 


 





 


Arterial Blood pH (Temp


corrected) 7.21 


 


 


 





 


Arterial Blood pCO2 at


Patient Temp 18 mmHg


(35-46) 


 


 





 


Arterial Blood pCO2 (Temp


correct) 19 mmHg 


 


 


 





 


Arterial Blood pO2 at Patient


Temp 107 mmHg


() 


 


 





 


Arterial Blood pO2 (Temp


corrected) 112 mmHg 


 


 


 





 


Arterial Blood HCO3


 7 mmol/L


(21-28) 


 


 





 


Arterial Blood Base Excess


 -19 mmol/L


(-3-3) 


 


 





 


FiO2 40    


 


Hemoglobin


 


 6.3 g/dL


(12.0-15.5) 


 





 


Hematocrit


 


 21.3 %


(36.0-47.0) 


 





 


Mean Corpuscular Hemoglobin


Concent 


 30 g/dL


(31-37) 


 





 


Prothrombin Time


 


 34.6 SEC


(11.7-14.0) 


 





 


Prothromb Time International


Ratio 


 3.4 (0.8-1.1) 


 


 





 


Glucose (Fingerstick)


 


 


 109 mg/dL


(70-99) 107 mg/dL


(70-99)


 


Test


 8/17/20


06:10 8/17/20


06:20 8/17/20


12:12 





 


Glucose (Fingerstick)


 93 mg/dL


(70-99) 


 


 





 


White Blood Count


 


 39.1 x10^3/uL


(4.0-11.0) 33.0 x10^3/uL


(4.0-11.0) 





 


Red Blood Count


 


 2.87 x10^6/uL


(3.50-5.40) 2.28 x10^6/uL


(3.50-5.40) 





 


Hemoglobin


 


 8.0 g/dL


(12.0-15.5) 6.4 g/dL


(12.0-15.5) 





 


Hematocrit


 


 26.3 %


(36.0-47.0) 20.4 %


(36.0-47.0) 





 


Mean Corpuscular Volume  92 fL ()  89 fL ()  


 


Mean Corpuscular Hemoglobin  28 pg (25-35)  28 pg (25-35)  


 


Mean Corpuscular Hemoglobin


Concent 


 30 g/dL


(31-37) 31 g/dL


(31-37) 





 


Red Cell Distribution Width


 


 17.9 %


(11.5-14.5) 17.5 %


(11.5-14.5) 





 


Platelet Count


 


 280 x10^3/uL


(140-400) 239 x10^3/uL


(140-400) 





 


Neutrophils (%) (Auto)  94 % (31-73)  93 % (31-73)  


 


Lymphocytes (%) (Auto)  3 % (24-48)  4 % (24-48)  


 


Monocytes (%) (Auto)  3 % (0-9)  3 % (0-9)  


 


Eosinophils (%) (Auto)  0 % (0-3)  0 % (0-3)  


 


Basophils (%) (Auto)  0 % (0-3)  0 % (0-3)  


 


Neutrophils # (Auto)


 


 36.9 x10^3/uL


(1.8-7.7) 30.7 x10^3/uL


(1.8-7.7) 





 


Lymphocytes # (Auto)


 


 1.0 x10^3/uL


(1.0-4.8) 1.2 x10^3/uL


(1.0-4.8) 





 


Monocytes # (Auto)


 


 1.1 x10^3/uL


(0.0-1.1) 1.1 x10^3/uL


(0.0-1.1) 





 


Eosinophils # (Auto)


 


 0.0 x10^3/uL


(0.0-0.7) 0.0 x10^3/uL


(0.0-0.7) 





 


Basophils # (Auto)


 


 0.0 x10^3/uL


(0.0-0.2) 0.0 x10^3/uL


(0.0-0.2) 





 


Segmented Neutrophils %  38 % (35-66)   


 


Band Neutrophils %  52 % (0-9)   


 


Lymphocytes %  6 % (24-48)   


 


Monocytes %  4 % (0-10)   


 


Nucleated Red Blood Cells  2   


 


Dohle Bodies  Few   


 


Platelet Estimate


 


 Adequate


(ADEQUATE) 


 





 


Prothrombin Time


 


 27.9 SEC


(11.7-14.0) 


 





 


Prothromb Time International


Ratio 


 2.6 (0.8-1.1) 


 


 





 


Sodium Level


 


 154 mmol/L


(136-145) 153 mmol/L


(136-145) 





 


Potassium Level


 


 3.6 mmol/L


(3.5-5.1) 3.7 mmol/L


(3.5-5.1) 





 


Chloride Level


 


 119 mmol/L


() 118 mmol/L


() 





 


Carbon Dioxide Level


 


 14 mmol/L


(21-32) 15 mmol/L


(21-32) 





 


Anion Gap  21 (6-14)  20 (6-14)  


 


Blood Urea Nitrogen


 


 58 mg/dL


(7-20) 56 mg/dL


(7-20) 





 


Creatinine


 


 2.5 mg/dL


(0.6-1.0) 2.5 mg/dL


(0.6-1.0) 





 


Estimated GFR


(Cockcroft-Gault) 


 20.5 


 20.5 


 





 


BUN/Creatinine Ratio  23 (6-20)  22 (6-20)  


 


Glucose Level


 


 100 mg/dL


(70-99) 76 mg/dL


(70-99) 





 


Calcium Level


 


 7.8 mg/dL


(8.5-10.1) 7.3 mg/dL


(8.5-10.1) 





 


Total Bilirubin


 


 0.7 mg/dL


(0.2-1.0) 1.3 mg/dL


(0.2-1.0) 





 


Aspartate Amino Transf


(AST/SGOT) 


 30 U/L (15-37) 


 34 U/L (15-37) 


 





 


Alanine Aminotransferase


(ALT/SGPT) 


 13 U/L (14-59) 


 9 U/L (14-59) 


 





 


Alkaline Phosphatase


 


 338 U/L


() 282 U/L


() 





 


Total Protein


 


 4.8 g/dL


(6.4-8.2) 4.3 g/dL


(6.4-8.2) 





 


Albumin


 


 2.0 g/dL


(3.4-5.0) 1.9 g/dL


(3.4-5.0) 





 


Albumin/Globulin Ratio  0.7 (1.0-1.7)  0.8 (1.0-1.7)  


 


Lactic Acid Level


 


 


 5.9 mmol/L


(0.4-2.0) 





 


Phosphorus Level


 


 


 2.4 mg/dL


(2.6-4.7) 





 


Magnesium Level


 


 


 1.8 mg/dL


(1.8-2.4) 











Laboratory Tests








Test


 8/16/20


18:09 8/16/20


23:05 8/16/20


23:30 8/17/20


00:13


 


Glucose (Fingerstick)


 171 mg/dL


(70-99) 


 


 109 mg/dL


(70-99)


 


O2 Saturation  97 % (92-99)   


 


Arterial Blood pH


 


 7.22


(7.35-7.45) 


 





 


Arterial Blood pH (Temp


corrected) 


 7.21 


 


 





 


Arterial Blood pCO2 at


Patient Temp 


 18 mmHg


(35-46) 


 





 


Arterial Blood pCO2 (Temp


correct) 


 19 mmHg 


 


 





 


Arterial Blood pO2 at Patient


Temp 


 107 mmHg


() 


 





 


Arterial Blood pO2 (Temp


corrected) 


 112 mmHg 


 


 





 


Arterial Blood HCO3


 


 7 mmol/L


(21-28) 


 





 


Arterial Blood Base Excess


 


 -19 mmol/L


(-3-3) 


 





 


FiO2  40   


 


Hemoglobin


 


 


 6.3 g/dL


(12.0-15.5) 





 


Hematocrit


 


 


 21.3 %


(36.0-47.0) 





 


Mean Corpuscular Hemoglobin


Concent 


 


 30 g/dL


(31-37) 





 


Prothrombin Time


 


 


 34.6 SEC


(11.7-14.0) 





 


Prothromb Time International


Ratio 


 


 3.4 (0.8-1.1) 


 





 


Test


 8/17/20


04:01 8/17/20


06:10 8/17/20


06:20 8/17/20


12:12


 


Glucose (Fingerstick)


 107 mg/dL


(70-99) 93 mg/dL


(70-99) 


 





 


White Blood Count


 


 


 39.1 x10^3/uL


(4.0-11.0) 33.0 x10^3/uL


(4.0-11.0)


 


Red Blood Count


 


 


 2.87 x10^6/uL


(3.50-5.40) 2.28 x10^6/uL


(3.50-5.40)


 


Hemoglobin


 


 


 8.0 g/dL


(12.0-15.5) 6.4 g/dL


(12.0-15.5)


 


Hematocrit


 


 


 26.3 %


(36.0-47.0) 20.4 %


(36.0-47.0)


 


Mean Corpuscular Volume   92 fL ()  89 fL () 


 


Mean Corpuscular Hemoglobin   28 pg (25-35)  28 pg (25-35) 


 


Mean Corpuscular Hemoglobin


Concent 


 


 30 g/dL


(31-37) 31 g/dL


(31-37)


 


Red Cell Distribution Width


 


 


 17.9 %


(11.5-14.5) 17.5 %


(11.5-14.5)


 


Platelet Count


 


 


 280 x10^3/uL


(140-400) 239 x10^3/uL


(140-400)


 


Neutrophils (%) (Auto)   94 % (31-73)  93 % (31-73) 


 


Lymphocytes (%) (Auto)   3 % (24-48)  4 % (24-48) 


 


Monocytes (%) (Auto)   3 % (0-9)  3 % (0-9) 


 


Eosinophils (%) (Auto)   0 % (0-3)  0 % (0-3) 


 


Basophils (%) (Auto)   0 % (0-3)  0 % (0-3) 


 


Neutrophils # (Auto)


 


 


 36.9 x10^3/uL


(1.8-7.7) 30.7 x10^3/uL


(1.8-7.7)


 


Lymphocytes # (Auto)


 


 


 1.0 x10^3/uL


(1.0-4.8) 1.2 x10^3/uL


(1.0-4.8)


 


Monocytes # (Auto)


 


 


 1.1 x10^3/uL


(0.0-1.1) 1.1 x10^3/uL


(0.0-1.1)


 


Eosinophils # (Auto)


 


 


 0.0 x10^3/uL


(0.0-0.7) 0.0 x10^3/uL


(0.0-0.7)


 


Basophils # (Auto)


 


 


 0.0 x10^3/uL


(0.0-0.2) 0.0 x10^3/uL


(0.0-0.2)


 


Segmented Neutrophils %   38 % (35-66)  


 


Band Neutrophils %   52 % (0-9)  


 


Lymphocytes %   6 % (24-48)  


 


Monocytes %   4 % (0-10)  


 


Nucleated Red Blood Cells   2  


 


Dohle Bodies   Few  


 


Platelet Estimate


 


 


 Adequate


(ADEQUATE) 





 


Prothrombin Time


 


 


 27.9 SEC


(11.7-14.0) 





 


Prothromb Time International


Ratio 


 


 2.6 (0.8-1.1) 


 





 


Sodium Level


 


 


 154 mmol/L


(136-145) 153 mmol/L


(136-145)


 


Potassium Level


 


 


 3.6 mmol/L


(3.5-5.1) 3.7 mmol/L


(3.5-5.1)


 


Chloride Level


 


 


 119 mmol/L


() 118 mmol/L


()


 


Carbon Dioxide Level


 


 


 14 mmol/L


(21-32) 15 mmol/L


(21-32)


 


Anion Gap   21 (6-14)  20 (6-14) 


 


Blood Urea Nitrogen


 


 


 58 mg/dL


(7-20) 56 mg/dL


(7-20)


 


Creatinine


 


 


 2.5 mg/dL


(0.6-1.0) 2.5 mg/dL


(0.6-1.0)


 


Estimated GFR


(Cockcroft-Gault) 


 


 20.5 


 20.5 





 


BUN/Creatinine Ratio   23 (6-20)  22 (6-20) 


 


Glucose Level


 


 


 100 mg/dL


(70-99) 76 mg/dL


(70-99)


 


Calcium Level


 


 


 7.8 mg/dL


(8.5-10.1) 7.3 mg/dL


(8.5-10.1)


 


Total Bilirubin


 


 


 0.7 mg/dL


(0.2-1.0) 1.3 mg/dL


(0.2-1.0)


 


Aspartate Amino Transf


(AST/SGOT) 


 


 30 U/L (15-37) 


 34 U/L (15-37) 





 


Alanine Aminotransferase


(ALT/SGPT) 


 


 13 U/L (14-59) 


 9 U/L (14-59) 





 


Alkaline Phosphatase


 


 


 338 U/L


() 282 U/L


()


 


Total Protein


 


 


 4.8 g/dL


(6.4-8.2) 4.3 g/dL


(6.4-8.2)


 


Albumin


 


 


 2.0 g/dL


(3.4-5.0) 1.9 g/dL


(3.4-5.0)


 


Albumin/Globulin Ratio   0.7 (1.0-1.7)  0.8 (1.0-1.7) 


 


Lactic Acid Level


 


 


 


 5.9 mmol/L


(0.4-2.0)


 


Phosphorus Level


 


 


 


 2.4 mg/dL


(2.6-4.7)


 


Magnesium Level


 


 


 


 1.8 mg/dL


(1.8-2.4)








Medications





Active Scripts








 Medications  Dose


 Route/Sig


 Max Daily Dose Days Date Category


 


 Bisoprolol


 Fumarate 5 Mg


 Tablet  10 Mg


 PO DAILY


   3/16/20 Reported








Comments








ct reviewed 7/12/20, Decreased left-sided effusion after catheter placement. The




right-sided effusion has increased as has atelectasis.


 





 


There has been exchange or placement of multiple drainage 


tubes and a gastrojejunostomy tube. Both collections are smaller. No 


significant new abdominal fluid collection is seen. The jejunal component 


of the gastrojejunostomy tube appears to be looped in the proximal small 


bowel. 











ct abdomen /pelvis 6/6


1. Removal of the percutaneous pigtail drainage catheters since the prior 


exam. Sequela of pancreatitis with extensive pseudocysts again 


demonstrated, the right-sided collections are slightly larger since the 


prior exam, the left-sided collections are stable. See above.


2. Moderate to large left pleural effusion with atelectasis and collapse 


of most of the left lower lobe, stable. Small right pleural effusion is 


stable.


3. Gallstone.





ct chest 6/15 reviewed








 GRAM NEG COCCOBACILLI:MANY


        SQUAMOUS EPI CELL:RARE


        PMN (WBCs):FEW


        Unless otherwise specified, Testing Performed by:


        [facility]


        [address] [place] [address]


        For Inquires, the Physician may contact the Microbiology


        department at [phone]





  RESPIRATORY CULTURE  Final  


        Final





        MANY GRAM NEGATIVE RODS on 06/15/20 at 110


        FINAL ID= [PSEUDOMONAS AERUGINOSA]


        MICRO CHARGES


        PSEUDOMONAS AERUGINOSA





  ANTIMICROBIAL SUSCEPTIBILITY  Final  


        Comment





        NEG ANSON 56


        PSEUDOMONAS AERUGINOSA


        ANTIBIOTIC                        RESULT          INTERPRETATION


        AMIKACIN                          <=16                  S


        AZTREONAM                         <=4                   S


        CEFTAZIDIME                       <=1                   S


        CIPROFLOXACIN                     <=0.25                S


        CEFEPIME                          <=2                   S


        CEFTAZIDIME/AVIBACTAM             <=4                   S


        GENTAMICIN                        <=2                   S


        LEVOFLOXACIN                      <=0.5                 S





Impression


.


IMPRESSION:


1.  Acute hypoxemic respiratory failure secondary to ARDS status post trach, 

developed anemia 6/7, blood drainage from RLQ abdomen drain site, and 

surrounding firmness  / developed septic shock 6/7 from abdomen source, required

levo 6/7-- now on 2 liters with trach 


s/p 3 new drains 6/7 with brown color drainage, --- off pressors. Now sig 

bleeding from abd drain sites, anemia, coagulopathy, met acidosis and possible 

sepsis


S/P Exp. 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 

parapsilosis & PSAE (I-merrem/ceftazidime/AZT/cefepime))


Increase R/R, hr 8/11. RESPONDED TO BICARB/ PRN SUCTION


Acute gallstone pancreatitis with persistent necrosis


  - 4/9.  CT A/P Increased ascites. Persistent evidence of necrotizing 

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


  - 4/27. status post ROBERT drain placement; C. parapsilosis. s/p drain 5/6 + yeast

& high amylase; s/p additional drain on 5/8. Drains removed. 


  -5/6. fluid  candida parapsilosis fluid, amylase high


  - 6/6 showed multiple pseudocysts, slight larger on the right. s/p drains x 3,

6/7.  + PSAE (MDRO-R Cefepime, Zosyn ANSON < 64) and yeast, 


  -6/7 s/p drain replacement x 3; fluid cult PSAE (MDRO), yeast; treated


  -7/12 CT A/P shows smaller fluid collections.  


  -722 CT abdomen and pelvis drains in place       


Ascites s/p paracentesis 4/15 & 5/6. C. parapsilosis 


Cholelithiasis with thickening of the gallbladder wall.


JED, Hyperkalemia, Metabolic acidosis off dialysis


Acute hypoxic resp failure. trach/vent. sputum 6/13  + PSAE (I merrem) ; sputum 

culture July 19+ for PSAE R Merrem, sensitive to cefepime


Pleural effusion status post CTS left side


Abdominal fluid culture 6 /7 MDRO Pseudomonas, yeast


Sputum culture positive 7/19 for MDRO Pseudomonas


Chest tube fluid positive for 7/21 Candida Parapsilosis


S/P Exploratory laparotomy, lysis of adhesions, subtotal cholecystectomy with 

cholangiogram, gastrojejunostomy tube placement, pancreatic necrosectomy














Plan


.


Reamis chronically ill with ongoing sepsis, bleeding from abd drain sites, met 

acidosis


 on 2 liters N/C, required suctioning from nursing overnight 


prn suction


Transfuse as needed, 


Antibiotics per ID,


Up to chair, PT/OT


Follow surgery input-- 


DVT GI prophylaxis


Dr Franco to talk to DPOA for comfort care


not much to add pulmonary wise








DVT/GI PPX








Will see PRN call with any concerns 














SHARYN SOLORZANO MD                 Aug 17, 2020 17:32

## 2020-08-17 NOTE — NUR
SS following up with discharge planning. SS reviewed pt chart and discussed with pt RN. Pt 
transferred to . Pt had bleed and hemoglobin dropping. Pt kidney's failing. Per RN, 
pt too unstable for surgery or hemodialysis initiation at this time. Pt currently has trach 
and is on two liters nasal canula. Pt on IV Daptomycin, Micafungin, and Meropenem. Dr. Franco discussed with family. Pt's family considering comfort care at this time. SS will 
continue to follow for discharge planning.

## 2020-08-17 NOTE — NUR
While turning and cleaning pt with charge RN at approx 2130, this RN noted that pt's RLQ abd 
drain had blood in it. Pt was also noted to have mottling of skin that was not present the 
previous night shift and pt's resting HR had increased. Dr. Franco paged at 2140 and updated 
to pt's status, no new orders at this time. BP at shift change 

125/100



While turning and changing pt during 2300 vitals, more blood collection and clotting noted 
at this drain site. No blood noted in G-tube drain, rectal tube, or [place] catheter bag. Pt 
noted to be more lethargic, skin tone pale, still mottled. RN conferred with nursing 
supervisor who assessed pt at this time. ABG, HH, and PT/INR ordered at this time. /97



2352- Dr. Franco pagemarietta for critical HGB 6.3, critical PCO2 18, pH 7.21, HCO3 7

0023- Dr. Franco re-paged re: critical labs

0055- Dr. Franco re-paged re: critical labs

0119- Dr. Franco returned page, orders for 2u PRBC, 1u FFP, 10mg vitamin K, 50 mEq sodium 
bicarb in 1L 1/2 NS

0142- LR infusion held, 1/2 NS with sodium bicarb started

0215- Type and Screen drawn from RUE PICC line, banded by 



0300- blood bank called, PRBC ready, FFP to be ready in approx 30 min

0312- pre-blood admin vitals, BP 84/41, 's, RR 36, SpO2 100%, T 100.1

     0314 repeat BP 74/31

0333- first unit PRBC started BP 81/34, tube feed and 1/2 NS with sodium bicarb on hold at 
this time

0345- BP 71/38

0347- BP 66/29

0350- BP 51/15 Rapid Response called, RT and House Supervisor responded, ICU charge RN 
called nurse's station

0354- BP 58/21

0358- BP 57/15

0400- BP 65/39, 50 mEq sodium bicarb IVP, pulled and administered, femoral pulse noted with 
doppler, FSBG 107, mottling no longer noted in extremities, nursing supervisor to assess 
pt's abd, blood and clotting noted, abd soft at this time per this RN and nursing 
supervisor's assessment 

0404- BP 76/40

0411- BP 84/44

0414- BP 83/40, Dr. Franco called to update on pt status, order received for CT abd/pelvis, 
OK to transfer to ICU if necessary, repeat ABG, morning labs

0424- BP 91/46

0430- BP 88/45

0436- BP 94/39 1st unit PRBC transfused, pt noted to be more alert at this time, moving her 
head, following RN with her eyes, skin tone more pink/less pale

0439- 2nd unit PRBC started

0444- BP 96/39

0446- FFP started

0454- /45

0504- /48

0514- /52

0524- /53

0534- /55

0538- 2nd unit PRBC transfused

0544- /58

0554- /60

0600- FFP transfused, 1/2 NS with 50mEq sodium bicarb restarted, albumin and meropenem 
started as ordered

0604- /58

0614- /58

0620- labs drawn by this RN, pt noted to be attempting to cough up mucus, charge RN 
performed sterile, deep tracheal suctioning, large amount of mucus suctioned per charge RN

0624- /54

0630- this RN and charge RN attempted to perform incontinence care/turn pt. Clotted blood in 
pt's abd dressing noted to have increased substantially, abd assessed by this RN and charge 
RN now noted to be distended and firm.

0650- report given to day shift, care transferred

0701- BP 89/50

0705- CT order changed to STAT

0715- day shift charge RN and ICU charge RN transferred pt to CT, on monitor, order for 
transfer to ICU

0730- night shift charge RN called pt's daughter to update on pt's status, this RN called 
pt's significant other to update on status

0810- this RN re-called pt's significant other to inform him of pt's new room assignment and 
ICU RN's name.

## 2020-08-17 NOTE — PDOC
Infectious Disease Note


Subjective


Subjective


Unresponsive





ROS


ROS


unable to obtain





Vital Sign


Vital Signs





Vital Signs








  Date Time  Temp Pulse Resp B/P (MAP) Pulse Ox O2 Delivery O2 Flow Rate FiO2


 


8/17/20 08:30     100 Nasal Cannula 2.0 


 


8/17/20 07:24 98.6 133 34 89/50 (63)    





 98.6       











Physical Exam


PHYSICAL EXAM


GENERAL: Propped up in bed, her eyes are wide open, upward gaze, unresponsive to

verbal, + visual threat 


HEENT: Pupils equal, oral cavity dry. 


NECK:  Tracheostomy 


LUNGS: Diminished aeration bases,


HEART:  S1, S2, 


ABDOMEN: Distended, bowel sounds hypoactive, soft, GJ drain present, drainage 

bag in place - bloody drainage, + rectal tube 


: [place] in place 


EXTREMITIES: Generalized edema,


SKIN: warm touch. 


NEURO: - Eyes are open, unresponsive to verbal and tactile stimuli 


RUE  PICC site without signs of complications. PIV s clean





Labs


Lab





Laboratory Tests








Test


 8/16/20


11:47 8/16/20


18:09 8/16/20


23:05 8/16/20


23:30


 


Glucose (Fingerstick)


 195 mg/dL


(70-99) 171 mg/dL


(70-99) 


 





 


O2 Saturation   97 % (92-99)  


 


Arterial Blood pH


 


 


 7.22


(7.35-7.45) 





 


Arterial Blood pH (Temp


corrected) 


 


 7.21 


 





 


Arterial Blood pCO2 at


Patient Temp 


 


 18 mmHg


(35-46) 





 


Arterial Blood pCO2 (Temp


correct) 


 


 19 mmHg 


 





 


Arterial Blood pO2 at Patient


Temp 


 


 107 mmHg


() 





 


Arterial Blood pO2 (Temp


corrected) 


 


 112 mmHg 


 





 


Arterial Blood HCO3


 


 


 7 mmol/L


(21-28) 





 


Arterial Blood Base Excess


 


 


 -19 mmol/L


(-3-3) 





 


FiO2   40  


 


Hemoglobin


 


 


 


 6.3 g/dL


(12.0-15.5)


 


Hematocrit


 


 


 


 21.3 %


(36.0-47.0)


 


Mean Corpuscular Hemoglobin


Concent 


 


 


 30 g/dL


(31-37)


 


Prothrombin Time


 


 


 


 34.6 SEC


(11.7-14.0)


 


Prothromb Time International


Ratio 


 


 


 3.4 (0.8-1.1) 





 


Test


 8/17/20


00:13 8/17/20


04:01 8/17/20


06:10 8/17/20


06:20


 


Glucose (Fingerstick)


 109 mg/dL


(70-99) 107 mg/dL


(70-99) 93 mg/dL


(70-99) 





 


Prothrombin Time


 


 


 


 27.9 SEC


(11.7-14.0)


 


Prothromb Time International


Ratio 


 


 


 2.6 (0.8-1.1) 





 


Sodium Level


 


 


 


 154 mmol/L


(136-145)


 


Potassium Level


 


 


 


 3.6 mmol/L


(3.5-5.1)


 


Chloride Level


 


 


 


 119 mmol/L


()


 


Carbon Dioxide Level


 


 


 


 14 mmol/L


(21-32)


 


Anion Gap    21 (6-14) 


 


Blood Urea Nitrogen


 


 


 


 58 mg/dL


(7-20)


 


Creatinine


 


 


 


 2.5 mg/dL


(0.6-1.0)


 


Estimated GFR


(Cockcroft-Gault) 


 


 


 20.5 





 


BUN/Creatinine Ratio    23 (6-20) 


 


Glucose Level


 


 


 


 100 mg/dL


(70-99)


 


Calcium Level


 


 


 


 7.8 mg/dL


(8.5-10.1)


 


Total Bilirubin


 


 


 


 0.7 mg/dL


(0.2-1.0)


 


Aspartate Amino Transf


(AST/SGOT) 


 


 


 30 U/L (15-37) 





 


Alanine Aminotransferase


(ALT/SGPT) 


 


 


 13 U/L (14-59) 





 


Alkaline Phosphatase


 


 


 


 338 U/L


()


 


Total Protein


 


 


 


 4.8 g/dL


(6.4-8.2)


 


Albumin


 


 


 


 2.0 g/dL


(3.4-5.0)


 


Albumin/Globulin Ratio    0.7 (1.0-1.7) 








Micro


CT 8/17





IMPRESSION:  


Redemonstration of extensive bilateral retroperitoneal and peritoneal 


collections, slightly increased in the left subdiaphragmatic region but 


otherwise similar in configuration to prior exams. Scattered areas of high


attenuation material within these collections consistent with acute blood 


products.








6/7 





GRAM STAIN  Final  


        Final





        GRAM NEGATIVE RODS:MODERATE


        SQUAMOUS EPI CELL:NOT APPLICABLE


        PMN (WBCs):RARE


        YEAST:MODERATE


        Unless otherwise specified, Testing Performed by:


        [address] [address]


        For Inquires, the Physician may contact the Microbiology


        department at [phone]





  ANAEROBIC-AEROBIC CULTURE  Preliminary  


        Preliminary





        MANY GRAM NEGATIVE RODS on 06/09/20 at 1159


        FINAL ID= [[email or web address] [email or web address]]


        PSEUDOMONAS AERUGINOSA





  ANTIMICROBIAL SUSCEPTIBILITY  Preliminary  


        Comment





        NEG ANSON 56


        PSEUDOMONAS AERUGINOSA


        ANTIBIOTIC                        RESULT          INTERPRETATION


        AMIKACIN                          <=16                  S


        AZTREONAM                         >16                   R


        CEFTAZIDIME                       >16                   R


        CIPROFLOXACIN                     <=0.25                S


        CEFEPIME                          16                    I


        CEFTAZIDIME/AVIBACTAM             <=4                   S


        GENTAMICIN                        <=2                   S














                               ** CONTINUED ON NEXT PAGE **





------------------------------------------------------

--------------------------------------





RUN DATE: 06/11/20                  [facility] *LIVE*               

  PAGE 2   


RUN TIME: 1016                            Specimen Inquiry                    


--------------------------------------------

------------------------------------------------





SPEC: [id number]    PATIENT: GENEVAJESENIA                [id number]  (

Continued)


 

--------------------------------------------------------------------------------


------------








----------------------

----------------------------------------------------------------------





  Procedure                         Result                                      

         


 

--------------------------------------------------------------------------------


------------





  ANTIMICROBIAL SUSCEPTIBILITY  Preliminary   (continued)


        LEVOFLOXACIN                      <=0.5                 S


        MEROPENEM                         <=1                   S


        PIPERACILLIN/TAZOBACTAM           64                    S


        TOBRAMYCIN                        <=2                   S


        Unless otherwise specified, Testing Performed by:


        [facility]


        [address] [place] [address]


        For Inquires, the Physician may contact the Microbiology


        department at [phone]











CT Scan 6/6





IMPRESSION:


1. Removal of the percutaneous pigtail drainage catheters since the prior 


exam. Sequela of pancreatitis with extensive pseudocysts again 


demonstrated, the right-sided collections are slightly larger since the 


prior exam, the left-sided collections are stable. See above.


2. Moderate to large left pleural effusion with atelectasis and collapse 


of most of the left lower lobe, stable. Small right pleural effusion is 


stable.


3. Gallstone.





Objective


Assessment





Patient with prolonged hospitalization more than 4 months


Multiple medical problems


Multiple surgical procedures





GI bleed - s/p PRBCs/FFP/vit K


Fever


URINE with parapsilosis 8/10


S/P Exp. 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 

parapsilosis & PSAE (I-merrem/ceftazidime/AZT/cefepime))


Leukocytosis - worse


JED


Anemia


Coagulopathy


Loose stool but on tube feed - WBC down and no gross fever


Fever - 7/25 c-diff neg


Acute gallstone pancreatitis with persistent necrosis


  - 4/9.  CT A/P Increased ascites. Persistent evidence of necrotizing 

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


  - 4/27. status post ROBERT drain placement; C. parapsilosis. s/p drain 5/6 + yeast

& high amylase; s/p additional drain on 5/8. Drains removed. 


  -5/6. fluid  candida parapsilosis fluid, amylase high


  - 6/6 showed multiple pseudocysts, slight larger on the right. s/p drains x 3,

6/7.  + PSAE (MDRO-R Cefepime, Zosyn ANSON < 64) and yeast, 


  -6/7 s/p drain replacement x 3; fluid cult PSAE (MDRO), yeast; treated


  -7/12 CT A/P shows smaller fluid collections.  


  -722 CT abdomen and pelvis drains in place       


Ascites s/p paracentesis 4/15 & 5/6. C. parapsilosis 


Cholelithiasis with thickening of the gallbladder wall.


JED, Hyperkalemia, Metabolic acidosis off dialysis


Acute hypoxic resp failure. trach/vent. sputum 6/13  + PSAE (I merrem) ; sputum 

culture July 19+ for PSAE R Merrem, sensitive to cefepime


Pleural effusion status post CTS left side


 Abdominal fluid culture 6 /7 MDRO Pseudomonas, yeast


Sputum culture positive 7/19 for MDRO Pseudomonas


Chest tube fluid positive for 7/21 Candida Parapsilosis





Plan


Plan of Care





CXR


Cont daptomycin, and micafungin started 8/16


D/c Meropenem and dose Avycaz d/w pharmacy 8/17


Await Surgical eval re CT findingis


Blood cults Pend


D/w micro re: Urine 8/10 - C parapsilosis - dose Diflucan times one today - cont

corrie 


cefepime, renal dosing as needed and flagyl 8/10,- d/c'd 8/16


C diff neg 8/12


BC 8/10 negative


Monitor labs/temp 


[place] changed 8/11


Nystatin to groin


Right upper extremity PICC line placed 7/31


Wound care /drain management as directed


Contact isolation for CRE/MDRO








Long term prognosis  poor





D/w nursing











RASHAWN ROSEN MD              Aug 17, 2020 09:30

## 2020-08-17 NOTE — NUR
This RN spoke with sy Campos to inform of patient transfer to ICU due to likely 
bleeding.  Lb, ICU charge RN notified of family contact.  Beth states that the phone 
number [phone] we have for sy Manzanares is a good phone number and that she will 
notify Glenna.

## 2020-08-17 NOTE — PDOC
DATE OF SERVICE


DATE: 8/17/20 


TIME: 11:04





SUBJECTIVE


ROS


 noted by RN that pt's RLQ abd drain had blood in it. Pt  and also mottling of 

skin , later drop in BP, Pt transferred to ICU this morning  


AMS, Unresponsive





OBJECTIVE


Vital Signs





Vital Signs








  Date Time  Temp Pulse Resp B/P (MAP) Pulse Ox O2 Delivery O2 Flow Rate FiO2


 


8/17/20 08:30     100 Nasal Cannula 2.0 


 


8/17/20 07:24 98.6 133 34 89/50 (63)    





 98.6       








I & 0











Intake and Output 


 


 8/17/20





 07:00


 


Intake Total 2135 ml


 


Output Total 3475 ml


 


Balance -1340 ml


 


 


 


Intake Oral 0 ml


 


IV Total 385 ml


 


Tube Feeding 600 ml


 


Blood Product IV Normal Saline Flush 1150 ml


 


Output Urine Total 200 ml


 


Stool Total 1450 ml


 


Drainage Total 1825 ml











PHYSICAL EXAM


Physical Exam


GENERAL:eyes open, upward gaze, unresponsive to verbal


HEENT:OM dry  


NECK:  Tracheostomy +, On 2 Lts O2  


LUNGS: Diminished aeration bases,non labored 


HEART:  S1, S2, 


ABDOMEN: Distended, bowel sounds hypoactive, soft, GJ drain present, drainage 

bag in place - bloody drainage, + rectal tube 


: [place] in place 


EXTREMITIES: Generalized edema,


SKIN: warm touch, no rash 


NEURO: - Eyes are open, unresponsive to verbal and tactile stimuli





DIAGNOSIS/ASSESSMENT


Assessment & Plan





JED- suspect ATN  


Had JED earlier during hospitalization , reqd CRRT/HD with resolution of JED  


Has not required RRT for many months 


Noted to have worsening renal function again over the weekend


Cr worsening since 8/10  , UOP declining , most of the output from other drains 

, acidotic  , CT scan  Unchanged mild right  hydronephrosis.


Will need to initiate HD, will need Temp HDC , If doesnt tolerate  HD will 

switch to CRRT  , Dw Family members at bedside  





HypoTensive  - Not on pressors currently  





AMS- unresponsive 





Acidosis- Has been recieving IV bicarb per Primary  , K normal 





HyperNatremia - monitor  





GI bleed - s/p PRBCs/FFP/vit K





Fever- Leukocytosis worse . on Abx 





HyperCalcemia-  Neph was reconsulted on 8/6  seen by me 


Resolved , No interim Neph fu  








Acute gallstone pancreatitis with persistent necrosis, Cholelithiasis with 

thickening of the gallbladder wall.


   S/P Exp. Lap, REN, naif, G-J tube & pancreatic necrosectomy on 6/30








Anemia- 2/2 GI bleed, GS following  





Acute hypoxic resp failure. trach- was on Vent, currently on 2 Lts O2  





Pleural effusion status post CTS left side- Bilateral pleural effusions/pulm 

edema s/p Throacentesis on 6/15/2020





 


Patient with prolonged hospitalization- 5 months


Multiple medical problems,Multiple surgical procedures





COMMENT/RELEVANT DATA


Meds





Current Medications








 Medications


  (Trade)  Dose


 Ordered  Sig/Yee  Start Time


 Stop Time Status Last Admin


Dose Admin


 


 Acetaminophen


  (Tylenol Supp)  650 mg  PRN Q6HRS  PRN  3/24/20 10:30


    8/10/20 15:43


650 MG


 


 Acetaminophen


  (Tylenol)  650 mg  PRN Q6HRS  PRN  8/16/20 04:15


    8/16/20 21:02


650 MG


 


 Acetaminophen/


 Hydrocodone Bitart


  (Lortab 5/325)  1 tab  PRN Q4HRS  PRN  7/23/20 16:00


    8/15/20 14:27


1 TAB


 


 Acetylcysteine


  (Mucomyst 20%


 Resp Treatment)  600 mg  RTBID  6/27/20 12:00


    8/17/20 08:00


600 MG


 


 Albumin Human  100 ml @ 


 100 mls/hr  Q12H  8/15/20 18:00


    8/17/20 06:05


100 MLS/HR


 


 Albuterol Sulfate


  (Ventolin Neb


 Soln)  2.5 mg  1X  ONCE  3/17/20 22:30


 3/17/20 22:31 DC 3/18/20 00:56


2.5 MG


 


 Albuterol/


 Ipratropium


  (Duoneb)  3 ml  Q4HRS  6/13/20 08:00


    8/17/20 08:19


3 ML


 


 Alprazolam


  (Xanax)  0.5 mg  PRN QID  PRN  7/23/20 16:00


    8/14/20 08:41


0.5 MG


 


 Alteplase,


 Recombinant


  (Cathflo For


 Central Catheter


 Clearance)  1 mg  1X  ONCE  8/5/20 07:00


 8/5/20 07:01 DC 8/5/20 09:30


1 MG


 


 Alteplase,


 Recombinant 4 mg/


 Sodium Chloride  20 ml @ 20


 mls/hr  1X  ONCE  6/17/20 10:00


 6/17/20 10:59 DC 6/17/20 10:09


20 MLS/HR


 


 Alteplase,


 Recombinant 5 mg/


 Sodium Chloride  30 ml @ 30


 mls/hr  1X  PRN  8/5/20 06:45


   UNV  





 


 Amino Acids/


 Glycerin/


 Electrolytes  1,000 ml @ 


 80 mls/hr  [id number]  7/26/20 10:15


 8/2/20 07:07 DC 8/1/20 18:10


80 MLS/HR


 


 Artificial Tears


  (Artificial


 Tears)  1 drop  PRN Q15MIN  PRN  4/29/20 05:30


    6/23/20 21:17


1 DROP


 


 Atenolol


  (Tenormin)  100 mg  DAILY  3/17/20 09:00


 3/16/20 20:08 DC  





 


 Atropine Sulfate


  (ATROPINE 0.5mg


 SYRINGE)  0.5 mg  PRN Q5MIN  PRN  4/2/20 08:15


 8/3/20 09:45 DC  





 


 Barium Sulfate


  (Varibar Thin


 Liquid Apple)  148 gm  1X  ONCE  5/26/20 11:45


 5/26/20 11:49 DC  





 


 Benzocaine


  (Hurricaine One)  1 spray  1X  ONCE  3/20/20 14:30


 3/20/20 14:31 DC 3/20/20 16:38


1 SPRAY


 


 Bisacodyl


  (Dulcolax Supp)  10 mg  STK-MED ONCE  4/27/20 10:59


 4/27/20 10:59 DC  





 


 Bumetanide


  (Bumex)  2 mg  DAILY  5/8/20 10:00


 5/18/20 17:15 DC 5/18/20 08:07


2 MG


 


 Bupivacaine HCl/


 Epinephrine Bitart


  (Sensorcain-Epi


 0.5%-1:202998 Mpf)  30 ml  STK-MED ONCE  6/30/20 08:34


 6/30/20 08:35 DC  





 


 Calcitonin [place]


  (Miacalcin)  400 unit  BID  8/6/20 18:00


 8/7/20 15:56 DC 8/6/20 18:18


400 UNIT


 


 Calcium Carbonate/


 Glycine


  (Tums)  500 mg  PRN [id number]  PRN  3/18/20 17:45


 5/13/20 10:25 DC  





 


 Calcium Chloride


 1000 mg/Sodium


 Chloride  110 ml @ 


 220 mls/hr  1X  ONCE  3/17/20 22:30


 3/17/20 22:59 DC 3/17/20 22:11


220 MLS/HR


 


 Calcium Chloride


 3000 mg/Sodium


 Chloride  1,030 ml @ 


 50 mls/hr  [id number]  3/19/20 08:00


 3/21/20 15:23 DC 3/21/20 02:17


50 MLS/HR


 


 Calcium Gluconate


  (Calcium


 Gluconate)  1,000 mg  1X  ONCE  8/14/20 14:45


 8/14/20 14:53 DC 8/14/20 15:25


1,000 MG


 


 Calcium Gluconate


 1000 mg/Sodium


 Chloride  110 ml @ 


 220 mls/hr  1X  ONCE  3/18/20 03:30


 3/18/20 03:59 DC 3/18/20 03:21


220 MLS/HR


 


 Calcium Gluconate


 2000 mg/Sodium


 Chloride  120 ml @ 


 220 mls/hr  1X  ONCE  3/18/20 07:30


 3/18/20 08:02 DC 3/18/20 09:05


220 MLS/HR


 


 Cefepime HCl


  (Maxipime)  2 gm  Q12HR  8/10/20 10:00


 8/16/20 14:48 DC 8/16/20 09:32


2 GM


 


 Ceftazidime/


 Avibactam 0.94 gm/


 Sodium Chloride  250 ml @ 


 125 mls/hr  Q12HR  8/17/20 13:00


     





 


 Ceftazidime/


 Avibactam 2.5 gm/


 Sodium Chloride  250 ml @ 


 125 mls/hr  Q8HRS  7/23/20 08:00


 8/5/20 08:20 DC 8/5/20 05:38


125 MLS/HR


 


 Cellulose


  (Surgicel


 Fibrillar 1x2)  1 each  STK-MED ONCE  4/6/20 11:00


 4/6/20 11:01 DC  





 


 Cellulose


  (Surgicel


 Hemostat 2x14)  1 each  STK-MED ONCE  4/27/20 10:58


 4/27/20 10:59 DC  





 


 Cellulose


  (Surgicel


 Hemostat 4x8)  1 each  STK-MED ONCE  4/27/20 10:58


 4/27/20 10:59 DC  





 


 Chlorhexidine


 Gluconate


  (Peridex)  15 ml  BID  6/13/20 09:00


 6/13/20 07:58 DC  





 


 Ciprofloxacin/


 Dextrose  200 ml @ 


 200 mls/hr  Q12HR  7/12/20 10:00


 7/21/20 08:20 DC 7/20/20 21:02


200 MLS/HR


 


 Cyclobenzaprine


 HCl


  (Flexeril)  10 mg  PRN Q6HRS  PRN  4/30/20 10:45


    8/15/20 14:25


10 MG


 


 Daptomycin 410 mg/


 Sodium Chloride  50 ml @ 


 100 mls/hr  Q24H  6/7/20 14:00


 6/10/20 08:30 DC 6/9/20 13:33


100 MLS/HR


 


 Daptomycin 430 mg/


 Sodium Chloride  50 ml @ 


 100 mls/hr  Q24H  4/25/20 13:00


 4/30/20 20:58 DC 4/30/20 13:00


100 MLS/HR


 


 Daptomycin 450 mg/


 Sodium Chloride  50 ml @ 


 100 mls/hr  Q24H  5/17/20 09:00


 5/21/20 08:30 DC 5/20/20 09:25


100 MLS/HR


 


 Daptomycin 480 mg/


 Sodium Chloride  50 ml @ 


 100 mls/hr  Q24H  8/16/20 16:00


    8/16/20 18:02


100 MLS/HR


 


 Daptomycin 485 mg/


 Sodium Chloride  50 ml @ 


 100 mls/hr  Q24H  5/4/20 11:00


 5/12/20 07:44 DC 5/11/20 13:10


100 MLS/HR


 


 Daptomycin 500 mg/


 Sodium Chloride  50 ml @ 


 100 mls/hr  Q24H  7/26/20 09:00


 8/5/20 08:20 DC 8/4/20 09:20


100 MLS/HR


 


 Desflurane


  (Suprane)  90 ml  STK-MED ONCE  6/30/20 10:18


 6/30/20 10:19 DC  





 


 Dexamethasone


 Sodium Phosphate


  (Decadron)  4 mg  STK-MED ONCE  4/27/20 10:56


 4/27/20 10:57 DC  





 


 Dexmedetomidine


 HCl 400 mcg/


 Sodium Chloride  100 ml @ 0


 mls/hr  CONT  PRN  4/2/20 08:15


 5/30/20 18:31 DC 5/30/20 12:57


8 MLS/HR


 


 Dextrose


  (Dextrose


 50%-Water Syringe)  12.5 gm  PRN Q15MIN  PRN  3/16/20 09:30


     





 


 Digoxin


  (Lanoxin)  125 mcg  1X  ONCE  3/19/20 18:00


 3/19/20 18:01 DC 3/19/20 17:10


125 MCG


 


 Diphenhydramine


 HCl


  (Benadryl Oral


 Elixir)  12.5 mg  1X PRN  PRN  8/17/20 01:30


     





 


 Diphenhydramine


 HCl


  (Benadryl)  25 mg  1X  ONCE  7/16/20 19:00


 7/16/20 19:01 DC 7/16/20 18:56


25 MG


 


 Duloxetine HCl


  (Cymbalta)  30 mg  DAILY  5/10/20 14:00


 5/13/20 10:25 DC 5/11/20 09:48


30 MG


 


 Enoxaparin Sodium


  (Lovenox 100mg


 Syringe)  100 mg  Q12HR  4/21/20 21:00


   UNV  





 


 Enoxaparin Sodium


  (Lovenox 40mg


 Syringe)  40 mg  Q24H  7/1/20 08:00


    8/16/20 09:31


40 MG


 


 Ephedrine Sulfate


  (ePHEDrine PF IN


 SALINE SYRINGE)  50 mg  STK-MED ONCE  6/30/20 14:45


 6/30/20 14:45 DC  





 


 Etomidate


  (Amidate)  8 mg  1X  ONCE  3/23/20 08:30


 3/23/20 08:31 DC 3/23/20 08:33


8 MG


 


 Fentanyl


  (Duragesic 12mcg/


 Hr Patch)  1 patch  Q3DAYS  7/10/20 09:00


    8/15/20 08:38


1 PATCH


 


 Fentanyl


  (Duragesic 50mcg/


 Hr Patch)  1 patch  Q72H  6/4/20 21:00


 6/13/20 12:00 DC 6/4/20 21:22


1 PATCH


 


 Fentanyl Citrate


  (Fentanyl 2ml


 Vial)  100 mcg  STK-MED ONCE  8/4/20 15:03


 8/4/20 15:03 DC  





 


 Fentanyl Citrate


  (Fentanyl 5ml


 Vial)  250 mcg  1X  ONCE  5/8/20 09:15


 5/8/20 09:16 DC 5/8/20 09:30


50 MCG


 


 Fluconazole/


 Sodium Chloride  50 ml @ 


 100 mls/hr  ONCE  ONCE  8/17/20 10:15


 8/17/20 10:44 DC  





 


 Flumazenil


  (Romazicon)  0.5 mg  STK-MED ONCE  6/7/20 14:48


 6/7/20 14:48 DC  





 


 Fluoxetine HCl


  (PROzac)  20 mg  QHS  6/4/20 21:00


    8/16/20 21:02


20 MG


 


 Furosemide


  (Lasix)  40 mg  [id number]  8/6/20 09:00


 8/6/20 14:01 DC 8/6/20 13:51


40 MG


 


 Haloperidol


 Lactate


  (Haldol Inj)  3 mg  1X  ONCE  5/4/20 14:30


 5/4/20 14:31 DC 5/4/20 14:37


3 MG


 


 Heparin Sodium


  (Porcine)


  (Hep Lock Adult)  500 unit  STK-MED ONCE  4/7/20 09:29


 4/7/20 09:30 DC  





 


 Heparin Sodium


  (Porcine)


  (Heparin Sodium)  5,000 unit  Q12HR  4/27/20 21:00


 5/7/20 09:59 DC 5/6/20 20:57


5,000 UNIT


 


 Heparin Sodium


  (Porcine) 1000


 unit/Sodium


 Chloride  1,001 ml @ 


 1,001 mls/hr  1X  ONCE  6/30/20 06:00


 6/30/20 06:59 DC  





 


 Hydromorphone HCl


  (Dilaudid


 Standard PCA)  12 mg  STK-MED ONCE  5/1/20 15:50


 5/12/20 11:24 DC  





 


 Hydromorphone HCl


  (Dilaudid)  1 mg  PRN Q4HRS  PRN  5/4/20 19:00


 5/18/20 17:10 DC 5/18/20 06:25


1 MG


 


 Info


  (CONTRAST GIVEN


 -- Rx MONITORING)  1 each  PRN DAILY  PRN  7/21/20 11:45


 7/23/20 11:44 DC  





 


 Info


  (Icu Electrolyte


 Protocol)  1 ea  CONT PRN  PRN  3/29/20 13:15


     





 


 Info


  (PHARMACY


 MONITORING -- do


 not chart)  1 each  PRN DAILY  PRN  4/24/20 15:45


 5/26/20 14:14 DC  





 


 Info


  (Tpn Per


 Pharmacy)  1 each  PRN DAILY  PRN  3/18/20 12:30


   UNV  





 


 Insulin Human


 Lispro


  (HumaLOG)  0-9 UNITS  Q6HRS  3/16/20 09:30


    8/14/20 12:43


5 UNITS


 


 Insulin Human


 Regular


  (HumuLIN R VIAL)  5 unit  1X  ONCE  3/17/20 22:30


 3/17/20 22:31 DC 3/17/20 22:14


5 UNIT


 


 Iohexol


  (Omnipaque 240


 Mg/ml)  10 ml  1X  ONCE  8/4/20 15:45


 8/4/20 15:46 DC 8/4/20 15:00


10 ML


 


 Iohexol


  (Omnipaque 300


 Mg/ml)  50 ml  1X  ONCE  7/28/20 11:00


 7/28/20 11:01 DC  





 


 Iohexol


  (Omnipaque 350


 Mg/ml)  90 ml  1X  ONCE  3/16/20 03:30


 3/16/20 03:31 DC 3/16/20 03:25


90 ML


 


 Ketorolac


 Tromethamine


  (Toradol 30mg


 Vial)  30 mg  1X  ONCE  3/16/20 03:00


 3/16/20 03:01 DC 3/16/20 02:54


30 MG


 


 Lidocaine HCl


  (Buffered


 Lidocaine 1%)  5 ml  1X  ONCE  8/4/20 15:45


 8/4/20 15:46 DC 8/4/20 15:00


5 ML


 


 Lidocaine HCl


  (Glydo


  (Lidocaine) Jelly)  1 thomas  1X  ONCE  3/20/20 14:30


 3/20/20 14:31 DC 3/20/20 16:38


1 THOMAS


 


 Lidocaine HCl


  (Lidocaine 1%


 20ml Vial)  20 ml  1X  ONCE  6/7/20 15:00


 6/7/20 15:01 DC 6/7/20 15:30


20 ML


 


 Lidocaine HCl


  (Lidocaine Pf 2%


 Vial)  5 ml  STK-MED ONCE  6/30/20 07:44


 6/30/20 07:44 DC  





 


 Lidocaine HCl


  (Xylocaine-Mpf


 1% 2ml Vial)  2 ml  PRN 1X  PRN  4/27/20 07:00


 4/28/20 06:59 DC  





 


 Linezolid/Dextrose  300 ml @ 


 300 mls/hr  Q12HR  5/17/20 09:00


 5/20/20 08:11 DC 5/19/20 21:08


300 MLS/HR


 


 Lorazepam


  (Ativan Inj)  0.25 mg  PRN Q4HRS  PRN  6/3/20 07:30


    8/13/20 15:56


0.25 MG


 


 Magnesium Sulfate  50 ml @ 25


 mls/hr  1X  ONCE  8/14/20 15:00


 8/14/20 16:59 DC 8/14/20 15:24


25 MLS/HR


 


 Meropenem 1 gm/


 Sodium Chloride  100 ml @ 


 200 mls/hr  Q12HR  6/7/20 21:00


 6/25/20 08:56 DC 6/25/20 08:27


200 MLS/HR


 


 Meropenem 500 mg/


 Sodium Chloride  50 ml @ 


 100 mls/hr  Q6HRS  8/16/20 16:00


    8/17/20 06:05


100 MLS/HR


 


 Methylprednisolone


 Sodium Succinate


  (SOLU-Medrol


 125MG VIAL)  125 mg  1X  ONCE  6/13/20 06:15


 6/13/20 06:16 DC 6/13/20 06:26


125 MG


 


 Metoclopramide HCl


  (Reglan Vial)  10 mg  PRN Q3HRS  PRN  5/9/20 16:45


    5/14/20 04:25


10 MG


 


 Metoprolol


 Tartrate


  (Lopressor Vial)  5 mg  1X  ONCE  8/9/20 10:45


 8/9/20 10:46 DC 8/9/20 10:54


5 MG


 


 Metronidazole  100 ml @ 


 100 mls/hr  Q12HR  8/10/20 10:00


 8/16/20 14:48 DC 8/16/20 09:31


100 MLS/HR


 


 Micafungin Sodium


 100 mg/Dextrose  100 ml @ 


 100 mls/hr  Q24H  8/16/20 15:00


    8/16/20 15:29


100 MLS/HR


 


 Midazolam HCl


  (Versed)  2 mg  1X  ONCE  6/7/20 15:00


 6/7/20 15:01 DC 6/7/20 15:28


1 MG


 


 Midazolam HCl 100


 mg/Sodium Chloride  100 ml @ 1


 mls/hr  CONT  PRN  6/30/20 14:45


 8/3/20 09:45 DC 7/3/20 18:48


10 MLS/HR


 


 Midazolam HCl 50


 mg/Sodium Chloride  50 ml @ 0


 mls/hr  CONT  PRN  3/23/20 08:15


 3/28/20 15:59 DC 3/26/20 22:39


7 MLS/HR


 


 Morphine Sulfate


  (Morphine


 Sulfate)  1 mg  PRN Q1HR  PRN  6/30/20 14:45


 8/3/20 09:45 DC 7/25/20 18:28


1 MG


 


 Multi-Ingred


 Cream/Lotion/Oil/


 Oint


  (Artificial


 Tears Eye


 Ointment)  1 thomas  PRN Q1HR  PRN  3/25/20 17:30


 6/3/20 14:39 DC 4/13/20 08:19


1 THOMAS


 


 Multivitamins/


 Minerals


 Therapeutic


  (Centrum


 Multivit-Mineral


 Liq)  5 ml  DAILY  8/5/20 09:00


    8/16/20 09:30


5 ML


 


 Naloxone HCl


  (Narcan)  0.4 mg  PRN Q2MIN  PRN  6/30/20 14:45


 8/3/20 09:45 DC  





 


 Norepinephrine


 Bitartrate 8 mg/


 Dextrose  258 ml @ 


 13.332 mls/


 hr  CONT  PRN  6/7/20 06:30


 8/3/20 09:45 DC 7/2/20 09:09


1.6 MLS/HR


 


 Ondansetron HCl


  (Zofran)  4 mg  STK-MED ONCE  6/30/20 13:33


 6/30/20 13:33 DC  





 


 Pantoprazole


 Sodium


  (PROTONIX VIAL


 for IV PUSH)  40 mg  DAILYAC  3/16/20 11:30


    8/17/20 08:57


40 MG


 


 Phenylephrine HCl


  (Ken-Synephrine


 Inj)  10 mg  STK-MED ONCE  6/30/20 13:33


 6/30/20 13:33 DC  





 


 Phenylephrine HCl


  (PHENYLEPHRINE


 in 0.9% NACL PF)  1 mg  STK-MED ONCE  6/30/20 14:44


 6/30/20 14:45 DC  





 


 Phytonadione


  (Vitamin K


 Ampule)  10 mg  1X  ONCE  8/17/20 02:00


 8/17/20 02:01 DC 8/17/20 01:44


10 MG


 


 Piperacillin Sod/


 Tazobactam Sod


 3.375 gm/Sodium


 Chloride  50 ml @ 


 100 mls/hr  Q6HRS  5/27/20 12:00


 6/4/20 07:26 DC 6/4/20 06:10


100 MLS/HR


 


 Piperacillin Sod/


 Tazobactam Sod


 4.5 gm/Sodium


 Chloride  100 ml @ 


 200 mls/hr  1X  ONCE  3/16/20 06:00


 3/16/20 06:29 DC 3/16/20 05:44


200 MLS/HR


 


 Potassium


 Chloride 110 meq/


 Magnesium Sulfate


 20 meq/


 Multivitamins 10


 ml/Chromium/


 Copper/Manganese/


 Seleni/Zn 1 ml/


 Insulin Human


 Regular 15 unit/


 Total Parenteral


 Nutrition/Amino


 Acids/Dextrose/


 Fat Emulsion


 Intravenous  1,800 ml @ 


 75 mls/hr  TPN  CONT  5/24/20 22:00


 5/25/20 21:59 DC 5/24/20 22:48


75 MLS/HR


 


 Potassium


 Chloride 15 meq/


 Bicarbonate


 Dialysis Soln w/


 out KCl  5,007.5 ml


  @ 1,000 mls/


 hr  Q5H1M  3/29/20 20:00


 4/2/20 13:08 DC 4/1/20 18:14


1,000 MLS/HR


 


 Potassium


 Chloride 20 meq/


 Bicarbonate


 Dialysis Soln w/


 out KCl  5,010 ml @ 


 1,000 mls/hr  Q5H1M  3/25/20 16:00


 3/29/20 19:59 DC 3/29/20 14:54


1,000 MLS/HR


 


 Potassium


 Chloride 40 meq/


 Potassium Acetate


 60 meq/Magnesium


 Sulfate 10 meq/


 Multivitamins 10


 ml/Chromium/


 Copper/Manganese/


 Seleni/Zn 1 ml/


 Insulin Human


 Regular 20 unit/


 Total Parenteral


 Nutrition/Amino


 Acids/Dextrose/


 Fat Emulsion


 Intravenous  1,800 ml @ 


 75 mls/hr  TPN  CONT  6/4/20 22:00


 6/5/20 21:59 DC 6/5/20 00:03


75 MLS/HR


 


 Potassium


 Chloride 70 meq/


 Magnesium Sulfate


 20 meq/


 Multivitamins 10


 ml/Chromium/


 Copper/Manganese/


 Seleni/Zn 1 ml/


 Insulin Human


 Regular 15 unit/


 Total Parenteral


 Nutrition/Amino


 Acids/Dextrose/


 Fat Emulsion


 Intravenous  1,800 ml @ 


 75 mls/hr  TPN  CONT  5/29/20 22:00


 5/30/20 21:59 DC 5/29/20 23:13


75 MLS/HR


 


 Potassium


 Chloride 75 meq/


 Magnesium Sulfate


 15 meq/


 Multivitamins 10


 ml/Chromium/


 Copper/Manganese/


 Seleni/Zn 0.5 ml/


 Insulin Human


 Regular 15 unit/


 Total Parenteral


 Nutrition/Amino


 Acids/Dextrose/


 Fat Emulsion


 Intravenous  1,920 ml @ 


 80 mls/hr  TPN  CONT  5/9/20 22:00


 5/10/20 21:59 DC 5/9/20 22:41


80 MLS/HR


 


 Potassium


 Chloride 75 meq/


 Magnesium Sulfate


 15 meq/Calcium


 Gluconate 8 meq/


 Multivitamins 10


 ml/Chromium/


 Copper/Manganese/


 Seleni/Zn 0.5 ml/


 Insulin Human


 Regular 15 unit/


 Total Parenteral


 Nutrition/Amino


 Acids/Dextrose/


 Fat Emulsion


 Intravenous  1,920 ml @ 


 80 mls/hr  TPN  CONT  5/7/20 22:00


 5/8/20 21:59 DC 5/7/20 22:28


80 MLS/HR


 


 Potassium


 Chloride 75 meq/


 Magnesium Sulfate


 15 meq/Calcium


 Gluconate 8 meq/


 Multivitamins 10


 ml/Chromium/


 Copper/Manganese/


 Seleni/Zn 0.5 ml/


 Insulin Human


 Regular 20 unit/


 Total Parenteral


 Nutrition/Amino


 Acids/Dextrose/


 Fat Emulsion


 Intravenous  1,920 ml @ 


 80 mls/hr  TPN  CONT  5/6/20 22:00


 5/7/20 21:59 DC 5/6/20 22:00


80 MLS/HR


 


 Potassium


 Chloride 75 meq/


 Magnesium Sulfate


 15 meq/Calcium


 Gluconate 8 meq/


 Multivitamins 10


 ml/Chromium/


 Copper/Manganese/


 Seleni/Zn 0.5 ml/


 Insulin Human


 Regular 25 unit/


 Total Parenteral


 Nutrition/Amino


 Acids/Dextrose/


 Fat Emulsion


 Intravenous  1,920 ml @ 


 80 mls/hr  TPN  CONT  5/4/20 22:00


 5/5/20 21:59 DC 5/4/20 23:08


80 MLS/HR


 


 Potassium


 Chloride 75 meq/


 Magnesium Sulfate


 20 meq/Calcium


 Gluconate 10 meq/


 Multivitamins 10


 ml/Chromium/


 Copper/Manganese/


 Seleni/Zn 0.5 ml/


 Insulin Human


 Regular 25 unit/


 Total Parenteral


 Nutrition/Amino


 Acids/Dextrose/


 Fat Emulsion


 Intravenous  1,920 ml @ 


 80 mls/hr  TPN  CONT  5/3/20 22:00


 5/4/20 21:59 DC 5/3/20 22:04


80 MLS/HR


 


 Potassium


 Chloride 75 meq/


 Magnesium Sulfate


 20 meq/Calcium


 Gluconate 10 meq/


 Multivitamins 10


 ml/Chromium/


 Copper/Manganese/


 Seleni/Zn 0.5 ml/


 Insulin Human


 Regular 30 unit/


 Total Parenteral


 Nutrition/Amino


 Acids/Dextrose/


 Fat Emulsion


 Intravenous  1,920 ml @ 


 80 mls/hr  TPN  CONT  5/2/20 22:00


 5/3/20 22:00 DC 5/2/20 21:51


80 MLS/HR


 


 Potassium


 Chloride 80 meq/


 Magnesium Sulfate


 20 meq/


 Multivitamins 10


 ml/Chromium/


 Copper/Manganese/


 Seleni/Zn 0.5 ml/


 Insulin Human


 Regular 15 unit/


 Total Parenteral


 Nutrition/Amino


 Acids/Dextrose/


 Fat Emulsion


 Intravenous  1,920 ml @ 


 80 mls/hr  TPN  CONT  5/12/20 22:00


 5/13/20 21:59 DC 5/12/20 21:40


80 MLS/HR


 


 Potassium


 Chloride 80 meq/


 Magnesium Sulfate


 20 meq/


 Multivitamins 10


 ml/Chromium/


 Copper/Manganese/


 Seleni/Zn 1 ml/


 Insulin Human


 Regular 15 unit/


 Total Parenteral


 Nutrition/Amino


 Acids/Dextrose/


 Fat Emulsion


 Intravenous  1,800 ml @ 


 75 mls/hr  TPN  CONT  5/31/20 22:00


 6/1/20 21:59 DC 5/31/20 21:54


75 MLS/HR


 


 Potassium


 Chloride 90 meq/


 Magnesium Sulfate


 20 meq/


 Multivitamins 10


 ml/Chromium/


 Copper/Manganese/


 Seleni/Zn 1 ml/


 Insulin Human


 Regular 15 unit/


 Total Parenteral


 Nutrition/Amino


 Acids/Dextrose/


 Fat Emulsion


 Intravenous  1,800 ml @ 


 75 mls/hr  TPN  CONT  5/20/20 22:00


 5/21/20 21:59 DC 5/20/20 22:28


75 MLS/HR


 


 Potassium


 Chloride 90 meq/


 Magnesium Sulfate


 20 meq/


 Multivitamins 10


 ml/Chromium/


 Copper/Manganese/


 Seleni/Zn 1 ml/


 Insulin Human


 Regular 20 unit/


 Total Parenteral


 Nutrition/Amino


 Acids/Dextrose/


 Fat Emulsion


 Intravenous  1,800 ml @ 


 75 mls/hr  TPN  CONT  6/3/20 22:00


 6/4/20 21:59 DC 6/3/20 23:13


75 MLS/HR


 


 Potassium


 Chloride/Water  100 ml @ 


 100 mls/hr  Q1H  8/14/20 15:00


 8/14/20 16:59 DC 8/14/20 17:09


100 MLS/HR


 


 Potassium


 Phosphate 20 mmol/


 Sodium Chloride  106.6667


 ml @ 


 51.667 m...  1X  ONCE  3/25/20 13:00


 3/25/20 15:03 DC 3/25/20 12:51


51.667 MLS/HR


 


 Potassium Acetate


 30 meq/Magnesium


 Sulfate 14 meq/


 Multivitamins 10


 ml/Chromium/


 Copper/Manganese/


 Seleni/Zn 1 ml/


 Insulin Human


 Regular 15 unit/


 Sodium Chloride


 20 meq/Potassium


 Chloride 30 meq/


 Total Parenteral


 Nutrition/Amino


 Acids/Dextrose/


 Fat Emulsion


 Intravenous  1,920 ml @ 


 80 mls/hr  TPN  CONT  6/23/20 22:00


 6/24/20 21:59 DC 6/23/20 21:46


80 MLS/HR


 


 Potassium Acetate


 30 meq/Magnesium


 Sulfate 20 meq/


 Calcium Gluconate


 10 meq/


 Multivitamins 10


 ml/Chromium/


 Copper/Manganese/


 Seleni/Zn 0.5 ml/


 Insulin Human


 Regular 30 unit/


 Potassium


 Chloride 30 meq/


 Total Parenteral


 Nutrition/Amino


 Acids/Dextrose/


 Fat Emulsion


 Intravenous  1,920 ml @ 


 80 mls/hr  TPN  CONT  5/1/20 22:00


 5/2/20 21:59 DC 5/1/20 22:34


80 MLS/HR


 


 Potassium Acetate


 40 meq/Magnesium


 Sulfate 10 meq/


 Multivitamins 10


 ml/Chromium/


 Copper/Manganese/


 Seleni/Zn 1 ml/


 Insulin Human


 Regular 20 unit/


 Total Parenteral


 Nutrition/Amino


 Acids/Dextrose/


 Fat Emulsion


 Intravenous  1,920 ml @ 


 80 mls/hr  TPN  CONT  6/16/20 22:00


 6/17/20 21:59 DC 6/16/20 21:32


80 MLS/HR


 


 Potassium Acetate


 40 meq/Magnesium


 Sulfate 5 meq/


 Multivitamins 10


 ml/Chromium/


 Copper/Manganese/


 Seleni/Zn 1 ml/


 Insulin Human


 Regular 30 unit/


 Total Parenteral


 Nutrition/Amino


 Acids/Dextrose/


 Fat Emulsion


 Intravenous  1,920 ml @ 


 80 mls/hr  TPN  CONT  6/15/20 22:00


 6/16/20 19:34 DC 6/15/20 21:54


80 MLS/HR


 


 Potassium Acetate


 55 meq/Magnesium


 Sulfate 20 meq/


 Calcium Gluconate


 10 meq/


 Multivitamins 10


 ml/Chromium/


 Copper/Manganese/


 Seleni/Zn 0.5 ml/


 Insulin Human


 Regular 30 unit/


 Total Parenteral


 Nutrition/Amino


 Acids/Dextrose/


 Fat Emulsion


 Intravenous  1,920 ml @ 


 80 mls/hr  TPN  CONT  4/30/20 22:00


 5/1/20 21:59 DC 5/1/20 01:00


80 MLS/HR


 


 Potassium Acetate


 55 meq/Magnesium


 Sulfate 20 meq/


 Calcium Gluconate


 10 meq/


 Multivitamins 10


 ml/Chromium/


 Copper/Manganese/


 Seleni/Zn 0.5 ml/


 Insulin Human


 Regular 35 unit/


 Total Parenteral


 Nutrition/Amino


 Acids/Dextrose/


 Fat Emulsion


 Intravenous  1,920 ml @ 


 80 mls/hr  TPN  CONT  4/28/20 22:00


 4/29/20 21:59 DC 4/28/20 22:02


80 MLS/HR


 


 Potassium Acetate


 60 meq/Magnesium


 Sulfate 10 meq/


 Multivitamins 10


 ml/Chromium/


 Copper/Manganese/


 Seleni/Zn 1 ml/


 Insulin Human


 Regular 20 unit/


 Total Parenteral


 Nutrition/Amino


 Acids/Dextrose/


 Fat Emulsion


 Intravenous  1,920 ml @ 


 80 mls/hr  TPN  CONT  6/17/20 22:00


 6/18/20 21:59 DC 6/17/20 21:55


80 MLS/HR


 


 Potassium Acetate


 60 meq/Magnesium


 Sulfate 14 meq/


 Multivitamins 10


 ml/Chromium/


 Copper/Manganese/


 Seleni/Zn 1 ml/


 Insulin Human


 Regular 15 unit/


 Sodium Chloride


 20 meq/Total


 Parenteral


 Nutrition/Amino


 Acids/Dextrose/


 Fat Emulsion


 Intravenous  1,920 ml @ 


 80 mls/hr  TPN  CONT  6/22/20 22:00


 6/23/20 21:59 DC 6/22/20 21:54


80 MLS/HR


 


 Potassium Acetate


 60 meq/Magnesium


 Sulfate 14 meq/


 Multivitamins 10


 ml/Chromium/


 Copper/Manganese/


 Seleni/Zn 1 ml/


 Insulin Human


 Regular 15 unit/


 Total Parenteral


 Nutrition/Amino


 Acids/Dextrose/


 Fat Emulsion


 Intravenous  1,920 ml @ 


 80 mls/hr  TPN  CONT  6/21/20 22:00


 6/22/20 21:59 DC 6/21/20 22:22


80 MLS/HR


 


 Potassium Acetate


 60 meq/Magnesium


 Sulfate 14 meq/


 Multivitamins 10


 ml/Chromium/


 Copper/Manganese/


 Seleni/Zn 1 ml/


 Insulin Human


 Regular 20 unit/


 Total Parenteral


 Nutrition/Amino


 Acids/Dextrose/


 Fat Emulsion


 Intravenous  1,920 ml @ 


 80 mls/hr  TPN  CONT  6/18/20 22:00


 6/19/20 21:59 DC 6/18/20 22:26


80 MLS/HR


 


 Potassium Acetate


 60 meq/Magnesium


 Sulfate 5 meq/


 Multivitamins 10


 ml/Chromium/


 Copper/Manganese/


 Seleni/Zn 1 ml/


 Insulin Human


 Regular 30 unit/


 Total Parenteral


 Nutrition/Amino


 Acids/Dextrose/


 Fat Emulsion


 Intravenous  1,920 ml @ 


 80 mls/hr  TPN  CONT  6/6/20 22:00


 6/7/20 21:59 DC 6/6/20 21:54


80 MLS/HR


 


 Potassium Acetate


 65 meq/Magnesium


 Sulfate 20 meq/


 Calcium Gluconate


 10 meq/


 Multivitamins 10


 ml/Chromium/


 Copper/Manganese/


 Seleni/Zn 0.5 ml/


 Insulin Human


 Regular 30 unit/


 Total Parenteral


 Nutrition/Amino


 Acids/Dextrose/


 Fat Emulsion


 Intravenous  1,920 ml @ 


 80 mls/hr  TPN  CONT  4/29/20 22:00


 4/30/20 21:59 DC 4/29/20 22:22


80 MLS/HR


 


 Potassium Acetate


 80 meq/Magnesium


 Sulfate 5 meq/


 Multivitamins 10


 ml/Chromium/


 Copper/Manganese/


 Seleni/Zn 1 ml/


 Insulin Human


 Regular 20 unit/


 Total Parenteral


 Nutrition/Amino


 Acids/Dextrose/


 Fat Emulsion


 Intravenous  1,920 ml @ 


 80 mls/hr  TPN  CONT  6/5/20 22:00


 6/6/20 21:59 DC 6/5/20 21:59


80 MLS/HR


 


 Prochlorperazine


 Edisylate


  (Compazine)  5 mg  PACU PRN  PRN  4/27/20 07:00


 4/28/20 06:59 DC  





 


 Propofol


  (Diprivan)  200 mg  STK-MED ONCE  6/30/20 07:44


 6/30/20 07:44 DC  





 


 Ringer's Solution  1,000 ml @ 


 75 mls/hr  [id number]  8/15/20 23:00


    8/16/20 14:56


75 MLS/HR


 


 Rocuronium Bromide


  (Zemuron)  100 mg  STK-MED ONCE  6/30/20 07:44


 6/30/20 07:44 DC  





 


 Saliva Substitute


  (Biotene


 Moisturizing


 Mouth)  2 spray  PRN Q15MIN  PRN  5/21/20 11:00


     





 


 Sevoflurane


  (Ultane)  60 ml  STK-MED ONCE  4/27/20 12:26


 4/27/20 12:27 DC  





 


 Sodium


 Bicarbonate 150


 meq/Dextrose  1,150 ml @ 


 75 mls/hr  1X  ONCE  6/30/20 16:30


 7/1/20 07:49 DC 6/30/20 20:02


75 MLS/HR


 


 Sodium


 Bicarbonate 150


 meq/Sterile Water  1,150 ml @ 


 500 mls/hr  1X  ONCE  8/14/20 15:00


 8/14/20 17:17 DC 8/14/20 15:23


500 MLS/HR


 


 Sodium


 Bicarbonate 50


 meq/Sodium


 Chloride  1,050 ml @ 


 75 mls/hr  1X  ONCE  8/17/20 02:00


 8/17/20 15:59  8/17/20 01:42


75 MLS/HR


 


 Sodium Acetate 50


 meq/Potassium


 Acetate 55 meq/


 Magnesium Sulfate


 20 meq/Calcium


 Gluconate 10 meq/


 Multivitamins 10


 ml/Chromium/


 Copper/Manganese/


 Seleni/Zn 0.5 ml/


 Insulin Human


 Regular 35 unit/


 Total Parenteral


 Nutrition/Amino


 Acids/Dextrose/


 Fat Emulsion


 Intravenous  1,800 ml @ 


 75 mls/hr  TPN  CONT  4/25/20 22:00


 4/26/20 21:59 DC 4/25/20 22:03


75 MLS/HR


 


 Sodium Bicarbonate


  (Sodium Bicarb


 Adult 8.4% Syr)  50 meq  1X  ONCE  8/17/20 04:15


 8/17/20 04:16 DC 8/17/20 04:14


50 MEQ


 


 Sodium Bicarbonate


  (Sodium Bicarb


 Ped 8.4% Syr)  50 meq  1X  ONCE  8/17/20 04:15


 8/17/20 04:16 UNV  





 


 Sodium Chloride  1,000 ml @ 


 1,000 mls/hr  1X  ONCE  8/14/20 13:00


 8/14/20 13:59 DC 8/14/20 13:34


1,000 MLS/HR


 


 Sodium Chloride


  (Normal Saline


 Flush)  3 ml  QSHIFT  PRN  6/30/20 14:45


 8/3/20 09:45 DC  





 


 Sodium Chloride


 80 meq/Potassium


 Chloride 30 meq/


 Potassium Acetate


 30 meq/Magnesium


 Sulfate 14 meq/


 Multivitamins 10


 ml/Chromium/


 Copper/Manganese/


 Seleni/Zn 1 ml/


 Insulin Human


 Regular 15 unit/


 Total Parenteral


 Nutrition/Amino


 Acids/Dextrose/


 Fat Emulsion


 Intravenous  1,920 ml @ 


 80 mls/hr  TPN  CONT  7/1/20 22:00


 7/2/20 21:59 DC 7/1/20 23:05


80 MLS/HR


 


 Sodium Chloride


 90 meq/Calcium


 Gluconate 10 meq/


 Multivitamins 10


 ml/Chromium/


 Copper/Manganese/


 Seleni/Zn 0.5 ml/


 Total Parenteral


 Nutrition/Amino


 Acids/Dextrose/


 Fat Emulsion


 Intravenous  1,512 ml @ 


 63 mls/hr  TPN  CONT  3/18/20 22:00


 3/19/20 21:59 DC 3/18/20 22:06


63 MLS/HR


 


 Sodium Chloride


 90 meq/Calcium


 Gluconate 10 meq/


 Multivitamins 10


 ml/Chromium/


 Copper/Manganese/


 Seleni/Zn 1 ml/


 Total Parenteral


 Nutrition/Amino


 Acids/Dextrose/


 Fat Emulsion


 Intravenous  55.005 ml


  @ 2.292


 mls/hr  TPN  CONT  3/18/20 22:00


 3/18/20 12:33 DC  





 


 Sodium Chloride


 90 meq/Magnesium


 Sulfate 10 meq/


 Calcium Gluconate


 20 meq/


 Multivitamins 10


 ml/Chromium/


 Copper/Manganese/


 Seleni/Zn 0.5 ml/


 Total Parenteral


 Nutrition/Amino


 Acids/Dextrose/


 Fat Emulsion


 Intravenous  1,512 ml @ 


 63 mls/hr  TPN  CONT  3/19/20 22:00


 3/20/20 21:59 DC 3/19/20 22:25


63 MLS/HR


 


 Sodium Chloride


 90 meq/Magnesium


 Sulfate 12 meq/


 Calcium Gluconate


 15 meq/


 Multivitamins 10


 ml/Chromium/


 Copper/Manganese/


 Seleni/Zn 0.5 ml/


 Insulin Human


 Regular 25 unit/


 Total Parenteral


 Nutrition/Amino


 Acids/Dextrose/


 Fat Emulsion


 Intravenous  1,400 ml @ 


 58.333 mls/


 hr  TPN  CONT  4/8/20 22:00


 4/9/20 21:59 DC 4/8/20 21:41


58.333 MLS/HR


 


 Sodium Chloride


 90 meq/Potassium


 Chloride 15 meq/


 Magnesium Sulfate


 12 meq/Calcium


 Gluconate 15 meq/


 Multivitamins 10


 ml/Chromium/


 Copper/Manganese/


 Seleni/Zn 0.5 ml/


 Insulin Human


 Regular 25 unit/


 Total Parenteral


 Nutrition/Amino


 Acids/Dextrose/


 Fat Emulsion


 Intravenous  1,400 ml @ 


 58.333 mls/


 hr  TPN  CONT  4/7/20 22:00


 4/8/20 21:59 DC 4/7/20 22:13


58.333 MLS/HR


 


 Sodium Chloride


 90 meq/Potassium


 Chloride 15 meq/


 Potassium


 Phosphate 10 mmol/


 Magnesium Sulfate


 8 meq/Calcium


 Gluconate 15 meq/


 Multivitamins 10


 ml/Chromium/


 Copper/Manganese/


 Seleni/Zn 0.5 ml/


 Insulin Human


 Regular 25 unit/


 Total Parenteral


 Nutrition/Amino


 Acids/Dextrose/


 Fat Emulsion


 Intravenous  1,400 ml @ 


 58.333 mls/


 hr  TPN  CONT  4/5/20 22:00


 4/6/20 21:59 DC 4/5/20 21:20


58.333 MLS/HR


 


 Sodium Chloride


 90 meq/Potassium


 Chloride 15 meq/


 Potassium


 Phosphate 10 mmol/


 Magnesium Sulfate


 10 meq/Calcium


 Gluconate 20 meq/


 Multivitamins 10


 ml/Chromium/


 Copper/Manganese/


 Seleni/Zn 0.5 ml/


 Total Parenteral


 Nutrition/Amino


 Acids/Dextrose/


 Fat Emulsion


 Intravenous  1,400 ml @ 


 58.333 mls/


 hr  TPN  CONT  3/23/20 22:00


 3/24/20 21:59 DC 3/23/20 21:42


58.333 MLS/HR


 


 Sodium Chloride


 90 meq/Potassium


 Chloride 15 meq/


 Potassium


 Phosphate 10 mmol/


 Magnesium Sulfate


 12 meq/Calcium


 Gluconate 15 meq/


 Multivitamins 10


 ml/Chromium/


 Copper/Manganese/


 Seleni/Zn 0.5 ml/


 Insulin Human


 Regular 25 unit/


 Total Parenteral


 Nutrition/Amino


 Acids/Dextrose/


 Fat Emulsion


 Intravenous  1,400 ml @ 


 58.333 mls/


 hr  TPN  CONT  4/6/20 22:00


 4/7/20 21:59 DC 4/6/20 22:24


58.333 MLS/HR


 


 Sodium Chloride


 90 meq/Potassium


 Chloride 15 meq/


 Potassium


 Phosphate 15 mmol/


 Magnesium Sulfate


 10 meq/Calcium


 Gluconate 15 meq/


 Multivitamins 10


 ml/Chromium/


 Copper/Manganese/


 Seleni/Zn 0.5 ml/


 Total Parenteral


 Nutrition/Amino


 Acids/Dextrose/


 Fat Emulsion


 Intravenous  1,400 ml @ 


 58.333 mls/


 hr  TPN  CONT  3/24/20 22:00


 3/25/20 21:59 DC 3/24/20 22:17


58.333 MLS/HR


 


 Sodium Chloride


 90 meq/Potassium


 Chloride 15 meq/


 Potassium


 Phosphate 15 mmol/


 Magnesium Sulfate


 10 meq/Calcium


 Gluconate 20 meq/


 Multivitamins 10


 ml/Chromium/


 Copper/Manganese/


 Seleni/Zn 0.5 ml/


 Total Parenteral


 Nutrition/Amino


 Acids/Dextrose/


 Fat Emulsion


 Intravenous  1,200 ml @ 


 50 mls/hr  TPN  CONT  3/22/20 22:00


 3/22/20 14:17 DC  





 


 Sodium Chloride


 90 meq/Potassium


 Chloride 15 meq/


 Potassium


 Phosphate 18 mmol/


 Magnesium Sulfate


 8 meq/Calcium


 Gluconate 15 meq/


 Multivitamins 10


 ml/Chromium/


 Copper/Manganese/


 Seleni/Zn 0.5 ml/


 Insulin Human


 Regular 10 unit/


 Total Parenteral


 Nutrition/Amino


 Acids/Dextrose/


 Fat Emulsion


 Intravenous  1,400 ml @ 


 58.333 mls/


 hr  TPN  CONT  3/27/20 22:00


 3/28/20 21:59 DC 3/27/20 21:43


58.333 MLS/HR


 


 Sodium Chloride


 90 meq/Potassium


 Chloride 15 meq/


 Potassium


 Phosphate 18 mmol/


 Magnesium Sulfate


 8 meq/Calcium


 Gluconate 15 meq/


 Multivitamins 10


 ml/Chromium/


 Copper/Manganese/


 Seleni/Zn 0.5 ml/


 Insulin Human


 Regular 15 unit/


 Total Parenteral


 Nutrition/Amino


 Acids/Dextrose/


 Fat Emulsion


 Intravenous  1,400 ml @ 


 58.333 mls/


 hr  TPN  CONT  3/30/20 22:00


 3/31/20 21:59 DC 3/30/20 21:47


58.333 MLS/HR


 


 Sodium Chloride


 90 meq/Potassium


 Chloride 15 meq/


 Potassium


 Phosphate 18 mmol/


 Magnesium Sulfate


 8 meq/Calcium


 Gluconate 15 meq/


 Multivitamins 10


 ml/Chromium/


 Copper/Manganese/


 Seleni/Zn 0.5 ml/


 Insulin Human


 Regular 20 unit/


 Total Parenteral


 Nutrition/Amino


 Acids/Dextrose/


 Fat Emulsion


 Intravenous  1,400 ml @ 


 58.333 mls/


 hr  TPN  CONT  4/2/20 22:00


 4/3/20 21:59 DC 4/2/20 22:45


58.333 MLS/HR


 


 Sodium Chloride


 90 meq/Potassium


 Chloride 15 meq/


 Potassium


 Phosphate 18 mmol/


 Magnesium Sulfate


 8 meq/Calcium


 Gluconate 15 meq/


 Multivitamins 10


 ml/Chromium/


 Copper/Manganese/


 Seleni/Zn 0.5 ml/


 Total Parenteral


 Nutrition/Amino


 Acids/Dextrose/


 Fat Emulsion


 Intravenous  1,400 ml @ 


 58.333 mls/


 hr  TPN  CONT  3/26/20 22:00


 3/27/20 21:59 DC 3/26/20 22:00


58.333 MLS/HR


 


 Sodium Chloride


 90 meq/Potassium


 Chloride 30 meq/


 Potassium Acetate


 30 meq/Magnesium


 Sulfate 15 meq/


 Multivitamins 10


 ml/Chromium/


 Copper/Manganese/


 Seleni/Zn 1 ml/


 Insulin Human


 Regular 15 unit/


 Total Parenteral


 Nutrition/Amino


 Acids/Dextrose/


 Fat Emulsion


 Intravenous  1,680 ml @ 


 70 mls/hr  TPN  CONT  7/16/20 22:00


 7/17/20 21:59 DC 7/16/20 22:06


70 MLS/HR


 


 Sodium Chloride


 90 meq/Potassium


 Phosphate 15 mmol/


 Magnesium Sulfate


 12 meq/Calcium


 Gluconate 15 meq/


 Multivitamins 10


 ml/Chromium/


 Copper/Manganese/


 Seleni/Zn 0.5 ml/


 Insulin Human


 Regular 30 unit/


 Total Parenteral


 Nutrition/Amino


 Acids/Dextrose/


 Fat Emulsion


 Intravenous  1,400 ml @ 


 58.333 mls/


 hr  TPN  CONT  4/10/20 22:00


 4/11/20 21:59 DC 4/10/20 21:49


58.333 MLS/HR


 


 Sodium Chloride


 90 meq/Potassium


 Phosphate 15 mmol/


 Magnesium Sulfate


 12 meq/Calcium


 Gluconate 15 meq/


 Multivitamins 10


 ml/Chromium/


 Copper/Manganese/


 Seleni/Zn 0.5 ml/


 Insulin Human


 Regular 40 unit/


 Total Parenteral


 Nutrition/Amino


 Acids/Dextrose/


 Fat Emulsion


 Intravenous  1,400 ml @ 


 58.333 mls/


 hr  TPN  CONT  4/11/20 22:00


 4/12/20 21:59 DC 4/11/20 21:21


58.333 MLS/HR


 


 Sodium Chloride


 90 meq/Potassium


 Phosphate 19 mmol/


 Magnesium Sulfate


 12 meq/Calcium


 Gluconate 15 meq/


 Multivitamins 10


 ml/Chromium/


 Copper/Manganese/


 Seleni/Zn 0.5 ml/


 Insulin Human


 Regular 40 unit/


 Total Parenteral


 Nutrition/Amino


 Acids/Dextrose/


 Fat Emulsion


 Intravenous  1,400 ml @ 


 58.333 mls/


 hr  TPN  CONT  4/12/20 22:00


 4/13/20 21:59 DC 4/12/20 21:54


58.333 MLS/HR


 


 Sodium Chloride


 90 meq/Potassium


 Phosphate 5 mmol/


 Magnesium Sulfate


 12 meq/Calcium


 Gluconate 15 meq/


 Multivitamins 10


 ml/Chromium/


 Copper/Manganese/


 Seleni/Zn 0.5 ml/


 Insulin Human


 Regular 30 unit/


 Total Parenteral


 Nutrition/Amino


 Acids/Dextrose/


 Fat Emulsion


 Intravenous  1,400 ml @ 


 58.333 mls/


 hr  TPN  CONT  4/9/20 22:00


 4/10/20 21:59 DC 4/9/20 22:08


58.333 MLS/HR


 


 Sodium Chloride


 100 meq/Potassium


 Chloride 30 meq/


 Potassium Acetate


 30 meq/Magnesium


 Sulfate 12 meq/


 Multivitamins 10


 ml/Chromium/


 Copper/Manganese/


 Seleni/Zn 1 ml/


 Insulin Human


 Regular 15 unit/


 Total Parenteral


 Nutrition/Amino


 Acids/Dextrose/


 Fat Emulsion


 Intravenous  1,680 ml @ 


 70 mls/hr  TPN  CONT  7/5/20 22:00


 7/6/20 21:59 DC 7/5/20 21:23


70 MLS/HR


 


 Sodium Chloride


 100 meq/Potassium


 Chloride 40 meq/


 Magnesium Sulfate


 15 meq/Calcium


 Gluconate 15 meq/


 Multivitamins 10


 ml/Chromium/


 Copper/Manganese/


 Seleni/Zn 0.5 ml/


 Insulin Human


 Regular 35 unit/


 Total Parenteral


 Nutrition/Amino


 Acids/Dextrose/


 Fat Emulsion


 Intravenous  1,400 ml @ 


 58.333 mls/


 hr  TPN  CONT  4/19/20 22:00


 4/20/20 21:59 DC 4/19/20 22:46


58.333 MLS/HR


 


 Sodium Chloride


 100 meq/Potassium


 Chloride 40 meq/


 Magnesium Sulfate


 20 meq/Calcium


 Gluconate 10 meq/


 Multivitamins 10


 ml/Chromium/


 Copper/Manganese/


 Seleni/Zn 0.5 ml/


 Insulin Human


 Regular 35 unit/


 Total Parenteral


 Nutrition/Amino


 Acids/Dextrose/


 Fat Emulsion


 Intravenous  1,400 ml @ 


 58.333 mls/


 hr  TPN  CONT  4/23/20 22:00


 4/24/20 21:59 DC 4/24/20 00:06


58.333 MLS/HR


 


 Sodium Chloride


 100 meq/Potassium


 Chloride 40 meq/


 Magnesium Sulfate


 20 meq/Calcium


 Gluconate 15 meq/


 Multivitamins 10


 ml/Chromium/


 Copper/Manganese/


 Seleni/Zn 0.5 ml/


 Insulin Human


 Regular 35 unit/


 Total Parenteral


 Nutrition/Amino


 Acids/Dextrose/


 Fat Emulsion


 Intravenous  1,400 ml @ 


 58.333 mls/


 hr  TPN  CONT  4/22/20 22:00


 4/23/20 21:59 DC 4/22/20 22:27


58.333 MLS/HR


 


 Sodium Chloride


 100 meq/Potassium


 Phosphate 10 mmol/


 Magnesium Sulfate


 12 meq/Calcium


 Gluconate 15 meq/


 Multivitamins 10


 ml/Chromium/


 Copper/Manganese/


 Seleni/Zn 0.5 ml/


 Insulin Human


 Regular 35 unit/


 Potassium


 Chloride 20 meq/


 Total Parenteral


 Nutrition/Amino


 Acids/Dextrose/


 Fat Emulsion


 Intravenous  1,400 ml @ 


 58.333 mls/


 hr  TPN  CONT  4/16/20 22:00


 4/17/20 21:59 DC 4/16/20 22:10


58.333 MLS/HR


 


 Sodium Chloride


 100 meq/Potassium


 Phosphate 19 mmol/


 Magnesium Sulfate


 12 meq/Calcium


 Gluconate 15 meq/


 Multivitamins 10


 ml/Chromium/


 Copper/Manganese/


 Seleni/Zn 0.5 ml/


 Insulin Human


 Regular 40 unit/


 Potassium


 Chloride 20 meq/


 Total Parenteral


 Nutrition/Amino


 Acids/Dextrose/


 Fat Emulsion


 Intravenous  1,400 ml @ 


 58.333 mls/


 hr  TPN  CONT  4/15/20 22:00


 4/16/20 21:59 DC 4/15/20 21:20


58.333 MLS/HR


 


 Sodium Chloride


 100 meq/Potassium


 Phosphate 5 mmol/


 Magnesium Sulfate


 12 meq/Calcium


 Gluconate 15 meq/


 Multivitamins 10


 ml/Chromium/


 Copper/Manganese/


 Seleni/Zn 0.5 ml/


 Insulin Human


 Regular 35 unit/


 Potassium


 Chloride 20 meq/


 Total Parenteral


 Nutrition/Amino


 Acids/Dextrose/


 Fat Emulsion


 Intravenous  1,400 ml @ 


 58.333 mls/


 hr  TPN  CONT  4/17/20 22:00


 4/18/20 21:59 DC 4/17/20 22:59


58.333 MLS/HR


 


 Sodium Chloride


 110 meq/Potassium


 Chloride 30 meq/


 Potassium Acetate


 30 meq/Magnesium


 Sulfate 15 meq/


 Multivitamins 10


 ml/Chromium/


 Copper/Manganese/


 Seleni/Zn 1 ml/


 Insulin Human


 Regular 15 unit/


 Total Parenteral


 Nutrition/Amino


 Acids/Dextrose/


 Fat Emulsion


 Intravenous  1,680 ml @ 


 70 mls/hr  TPN  CONT  7/18/20 22:00


 7/19/20 21:59 DC 7/18/20 22:01


70 MLS/HR


 


 Sodium Chloride


 110 meq/Sodium


 Phosphate 10 mmol/


 Potassium


 Chloride 30 meq/


 Potassium Acetate


 30 meq/Magnesium


 Sulfate 15 meq/


 Multivitamins 10


 ml/Chromium/


 Copper/Manganese/


 Seleni/Zn 1 ml/


 Insulin Human


 Regular 15 unit/


 Total Parenteral


 Nutrition/Amino


 Acids/Dextrose/


 Fat Emulsion


 Intravenous  1,680 ml @ 


 70 mls/hr  TPN  CONT  7/19/20 22:00


 7/20/20 21:59 DC 7/19/20 22:03


70 MLS/HR


 


 Sodium Chloride


 120 meq/Sodium


 Phosphate 10 mmol/


 Potassium


 Chloride 30 meq/


 Potassium Acetate


 30 meq/Magnesium


 Sulfate 15 meq/


 Multivitamins 10


 ml/Chromium/


 Copper/Manganese/


 Seleni/Zn 1 ml/


 Insulin Human


 Regular 15 unit/


 Total Parenteral


 Nutrition/Amino


 Acids/Dextrose/


 Fat Emulsion


 Intravenous  1,680 ml @ 


 70 mls/hr  TPN  CONT  7/26/20 22:00


 7/27/20 21:59 Cancel  





 


 Succinylcholine


 Chloride


  (Anectine)  120 mg  1X  ONCE  3/23/20 08:30


 3/23/20 08:31 DC 3/23/20 08:34


120 MG


 


 Vancomycin HCl


  (Vanco Per


 Pharmacy)  1 each  PRN DAILY  PRN  7/12/20 09:15


 7/15/20 07:41 DC 7/14/20 02:46


1 EACH


 


 Vancomycin HCl


  (Vancomycin


 Random Level)  1 each  1X  ONCE  7/14/20 01:00


 7/14/20 01:01 DC 7/14/20 01:00


1 EACH


 


 Vancomycin HCl


  (Vancomycin


 Trough Level)  1 each  1X  ONCE  7/15/20 09:30


 7/15/20 09:31 Cancel  





 


 Vancomycin HCl


 1.5 gm/Sodium


 Chloride  500 ml @ 


 250 mls/hr  Q12H  7/14/20 10:00


 7/15/20 07:41 DC 7/14/20 22:07


250 MLS/HR


 


 Vancomycin HCl 2


 gm/Sodium Chloride  500 ml @ 


 250 mls/hr  1X  ONCE  7/12/20 10:00


 7/12/20 11:59 DC 7/12/20 10:34


250 MLS/HR


 


 Vasopressin


  (Vasostrict)  20 unit  STK-MED ONCE  6/30/20 12:23


 6/30/20 12:23 DC  





 


 Vasopressin 20


 unit/Dextrose  101 ml @ 


 12 mls/hr  CONT  PRN  6/30/20 15:30


 8/3/20 09:45 DC 7/7/20 04:17


12 MLS/HR


 


 Vecuronium Bromide


  (Norcuron Bolus)  6 mg  PRN Q6HRS  PRN  5/7/20 19:15


 5/7/20 19:35 DC  





 


 Vitamin A/Vitamin


 D


  (Vitamin A & D


 Ointment)  1 thomas  PRN Q1HR  PRN  8/4/20 09:15


    8/13/20 08:36


1 THOMAS


 


 Zoledronic Acid  100 ml @ 


 400 mls/hr  1X  ONCE  8/7/20 12:00


 8/7/20 12:14 DC 8/7/20 13:04


400 MLS/HR








Lab





Laboratory Tests








Test


 8/16/20


11:47 8/16/20


18:09 8/16/20


23:05 8/16/20


23:30


 


Glucose (Fingerstick)


 195 mg/dL


(70-99) 171 mg/dL


(70-99) 


 





 


O2 Saturation   97 % (92-99)  


 


Arterial Blood pH


 


 


 7.22


(7.35-7.45) 





 


Arterial Blood pH (Temp


corrected) 


 


 7.21 


 





 


Arterial Blood pCO2 at


Patient Temp 


 


 18 mmHg


(35-46) 





 


Arterial Blood pCO2 (Temp


correct) 


 


 19 mmHg 


 





 


Arterial Blood pO2 at Patient


Temp 


 


 107 mmHg


() 





 


Arterial Blood pO2 (Temp


corrected) 


 


 112 mmHg 


 





 


Arterial Blood HCO3


 


 


 7 mmol/L


(21-28) 





 


Arterial Blood Base Excess


 


 


 -19 mmol/L


(-3-3) 





 


FiO2   40  


 


Hemoglobin


 


 


 


 6.3 g/dL


(12.0-15.5)


 


Hematocrit


 


 


 


 21.3 %


(36.0-47.0)


 


Mean Corpuscular Hemoglobin


Concent 


 


 


 30 g/dL


(31-37)


 


Prothrombin Time


 


 


 


 34.6 SEC


(11.7-14.0)


 


Prothromb Time International


Ratio 


 


 


 3.4 (0.8-1.1) 





 


Test


 8/17/20


00:13 8/17/20


04:01 8/17/20


06:10 8/17/20


06:20


 


Glucose (Fingerstick)


 109 mg/dL


(70-99) 107 mg/dL


(70-99) 93 mg/dL


(70-99) 





 


White Blood Count


 


 


 


 39.1 x10^3/uL


(4.0-11.0)


 


Red Blood Count


 


 


 


 2.87 x10^6/uL


(3.50-5.40)


 


Hemoglobin


 


 


 


 8.0 g/dL


(12.0-15.5)


 


Hematocrit


 


 


 


 26.3 %


(36.0-47.0)


 


Mean Corpuscular Volume    92 fL () 


 


Mean Corpuscular Hemoglobin    28 pg (25-35) 


 


Mean Corpuscular Hemoglobin


Concent 


 


 


 30 g/dL


(31-37)


 


Red Cell Distribution Width


 


 


 


 17.9 %


(11.5-14.5)


 


Platelet Count


 


 


 


 280 x10^3/uL


(140-400)


 


Neutrophils (%) (Auto)    94 % (31-73) 


 


Lymphocytes (%) (Auto)    3 % (24-48) 


 


Monocytes (%) (Auto)    3 % (0-9) 


 


Eosinophils (%) (Auto)    0 % (0-3) 


 


Basophils (%) (Auto)    0 % (0-3) 


 


Neutrophils # (Auto)


 


 


 


 36.9 x10^3/uL


(1.8-7.7)


 


Lymphocytes # (Auto)


 


 


 


 1.0 x10^3/uL


(1.0-4.8)


 


Monocytes # (Auto)


 


 


 


 1.1 x10^3/uL


(0.0-1.1)


 


Eosinophils # (Auto)


 


 


 


 0.0 x10^3/uL


(0.0-0.7)


 


Basophils # (Auto)


 


 


 


 0.0 x10^3/uL


(0.0-0.2)


 


Prothrombin Time


 


 


 


 27.9 SEC


(11.7-14.0)


 


Prothromb Time International


Ratio 


 


 


 2.6 (0.8-1.1) 





 


Sodium Level


 


 


 


 154 mmol/L


(136-145)


 


Potassium Level


 


 


 


 3.6 mmol/L


(3.5-5.1)


 


Chloride Level


 


 


 


 119 mmol/L


()


 


Carbon Dioxide Level


 


 


 


 14 mmol/L


(21-32)


 


Anion Gap    21 (6-14) 


 


Blood Urea Nitrogen


 


 


 


 58 mg/dL


(7-20)


 


Creatinine


 


 


 


 2.5 mg/dL


(0.6-1.0)


 


Estimated GFR


(Cockcroft-Gault) 


 


 


 20.5 





 


BUN/Creatinine Ratio    23 (6-20) 


 


Glucose Level


 


 


 


 100 mg/dL


(70-99)


 


Calcium Level


 


 


 


 7.8 mg/dL


(8.5-10.1)


 


Total Bilirubin


 


 


 


 0.7 mg/dL


(0.2-1.0)


 


Aspartate Amino Transf


(AST/SGOT) 


 


 


 30 U/L (15-37) 





 


Alanine Aminotransferase


(ALT/SGPT) 


 


 


 13 U/L (14-59) 





 


Alkaline Phosphatase


 


 


 


 338 U/L


()


 


Total Protein


 


 


 


 4.8 g/dL


(6.4-8.2)


 


Albumin


 


 


 


 2.0 g/dL


(3.4-5.0)


 


Albumin/Globulin Ratio    0.7 (1.0-1.7) 








Results


All relevant outside records, renal labs, imaging studies, telemetry/EKG's were 

reviewed.


Other


Redemonstration of extensive bilateral retroperitoneal and peritoneal 


collections, slightly increased in the left subdiaphragmatic region but 


otherwise similar in configuration to prior exams. Scattered areas of high


attenuation material within these collections consistent with acute blood 


products.


 


The noncontrast liver is homogeneous in attenuation. Pancreas is 


contracted. Normal caliber bile ducts. Normal spleen.


 


Persistent fluid around and partially securing the pancreas.


 


Normal adrenal glands. No opaque urinary calculi. Unchanged mild right 


hydronephrosis.


 


Percutaneous gastrojejunostomy tube. No dilated bowel 


 


Normal caliber abdominal aorta. No lymphadenopathy in the abdomen or 


pelvis. 


 


Urinary bladder is decompressed by [place] catheter. No acute osseous 


abnormality.


 


IMPRESSION:  


Redemonstration of extensive bilateral retroperitoneal and peritoneal 


collections, slightly increased in the left subdiaphragmatic region but 


otherwise similar in configuration to prior exams. Scattered areas of high


attenuation material within these collections consistent with acute blood 


products.





Justicifation of Admission Dx:


Justifications for Admission:


Justification of Admission Dx:  Yes











KIMBERLY MYERS MD                Aug 17, 2020 11:25

## 2020-08-17 NOTE — RAD
CT ABDOMEN PELVIS WO CONTRAST

 

INDICATION: Reason: bleeding from abd drain / Spl. Instructions:  / 

History: 

 

EXAM: Noncontrast CT of the abdomen and pelvis.  Coronal and sagittal 

reformatted images were performed.

 

PQRS compliance statement:

 

One or more of the following individualized dose reduction techniques were

utilized for this examination:

1. Automated exposure control

2. Adjustment of the mA and/or kV according to patient size

3. Use of iterative reconstruction technique

 

COMPARISON: 7/21/2020

 

FINDINGS: 

 

Lower chest: Small bilateral pleural effusions. Left basilar consolidation

 

ABDOMEN:

Redemonstration of extensive bilateral retroperitoneal and peritoneal 

collections, slightly increased in the left subdiaphragmatic region but 

otherwise similar in configuration to prior exams. Scattered areas of high

attenuation material within these collections consistent with acute blood 

products.

 

The noncontrast liver is homogeneous in attenuation. Pancreas is 

contracted. Normal caliber bile ducts. Normal spleen.

 

Persistent fluid around and partially securing the pancreas.

 

Normal adrenal glands. No opaque urinary calculi. Unchanged mild right 

hydronephrosis.

 

Percutaneous gastrojejunostomy tube. No dilated bowel 

 

Normal caliber abdominal aorta. No lymphadenopathy in the abdomen or 

pelvis. 

 

Urinary bladder is decompressed by [place] catheter. No acute osseous 

abnormality.

 

IMPRESSION:  

Redemonstration of extensive bilateral retroperitoneal and peritoneal 

collections, slightly increased in the left subdiaphragmatic region but 

otherwise similar in configuration to prior exams. Scattered areas of high

attenuation material within these collections consistent with acute blood 

products.

 

Electronically signed by: Anival Moody MD (8/17/2020 7:51 AM) [id number]

## 2020-08-17 NOTE — PDOC
Date of Service:


DATE: 8/17/20 


TIME: 13:31





Objective:


Objective:


D/w nurse from second floor earlier - to ICU after CT w/ bleeding from RUQ site,

hypotension.


D/w ICU - nurse - discussion of dialysis vs comfort care (decreased urine output

for a couple days), family spoke w/ Dr. Franco earlier.


Vital Signs:





                                   Vital Signs








  Date Time  Temp Pulse Resp B/P (MAP) Pulse Ox O2 Delivery O2 Flow Rate FiO2


 


8/17/20 13:27 99.1 131 32 101/49    





 99.1       


 


8/17/20 12:55     100 Nasal Cannula 2.0 








Labs:





Laboratory Tests








Test


 8/16/20


18:09 8/16/20


23:05 8/16/20


23:30 8/17/20


00:13


 


Glucose (Fingerstick) 171 mg/dL    109 mg/dL 


 


O2 Saturation  97 %   


 


Arterial Blood pH  7.22   


 


Arterial Blood pH (Temp


corrected) 


 7.21 


 


 





 


Arterial Blood pCO2 at


Patient Temp 


 18 mmHg 


 


 





 


Arterial Blood pCO2 (Temp


correct) 


 19 mmHg 


 


 





 


Arterial Blood pO2 at Patient


Temp 


 107 mmHg 


 


 





 


Arterial Blood pO2 (Temp


corrected) 


 112 mmHg 


 


 





 


Arterial Blood HCO3  7 mmol/L   


 


Arterial Blood Base Excess  -19 mmol/L   


 


FiO2  40   


 


Hemoglobin   6.3 g/dL  


 


Hematocrit   21.3 %  


 


Mean Corpuscular Hemoglobin


Concent 


 


 30 g/dL 


 





 


Prothrombin Time   34.6 SEC  


 


Prothromb Time International


Ratio 


 


 3.4 


 





 


Test


 8/17/20


04:01 8/17/20


06:10 8/17/20


06:20 8/17/20


12:12


 


Glucose (Fingerstick) 107 mg/dL  93 mg/dL   


 


White Blood Count   39.1 x10^3/uL  33.0 x10^3/uL 


 


Red Blood Count   2.87 x10^6/uL  2.28 x10^6/uL 


 


Hemoglobin   8.0 g/dL  6.4 g/dL 


 


Hematocrit   26.3 %  20.4 % 


 


Mean Corpuscular Volume   92 fL  89 fL 


 


Mean Corpuscular Hemoglobin   28 pg  28 pg 


 


Mean Corpuscular Hemoglobin


Concent 


 


 30 g/dL 


 31 g/dL 





 


Red Cell Distribution Width   17.9 %  17.5 % 


 


Platelet Count   280 x10^3/uL  239 x10^3/uL 


 


Neutrophils (%) (Auto)   94 %  93 % 


 


Lymphocytes (%) (Auto)   3 %  4 % 


 


Monocytes (%) (Auto)   3 %  3 % 


 


Eosinophils (%) (Auto)   0 %  0 % 


 


Basophils (%) (Auto)   0 %  0 % 


 


Neutrophils # (Auto)   36.9 x10^3/uL  30.7 x10^3/uL 


 


Lymphocytes # (Auto)   1.0 x10^3/uL  1.2 x10^3/uL 


 


Monocytes # (Auto)   1.1 x10^3/uL  1.1 x10^3/uL 


 


Eosinophils # (Auto)   0.0 x10^3/uL  0.0 x10^3/uL 


 


Basophils # (Auto)   0.0 x10^3/uL  0.0 x10^3/uL 


 


Platelet Estimate   Pending  


 


Prothrombin Time   27.9 SEC  


 


Prothromb Time International


Ratio 


 


 2.6 


 





 


Sodium Level   154 mmol/L  153 mmol/L 


 


Potassium Level   3.6 mmol/L  3.7 mmol/L 


 


Chloride Level   119 mmol/L  118 mmol/L 


 


Carbon Dioxide Level   14 mmol/L  15 mmol/L 


 


Anion Gap   21  20 


 


Blood Urea Nitrogen   58 mg/dL  56 mg/dL 


 


Creatinine   2.5 mg/dL  2.5 mg/dL 


 


Estimated GFR


(Cockcroft-Gault) 


 


 20.5 


 20.5 





 


BUN/Creatinine Ratio   23  22 


 


Glucose Level   100 mg/dL  76 mg/dL 


 


Calcium Level   7.8 mg/dL  7.3 mg/dL 


 


Total Bilirubin   0.7 mg/dL  1.3 mg/dL 


 


Aspartate Amino Transf


(AST/SGOT) 


 


 30 U/L 


 34 U/L 





 


Alanine Aminotransferase


(ALT/SGPT) 


 


 13 U/L 


 9 U/L 





 


Alkaline Phosphatase   338 U/L  282 U/L 


 


Total Protein   4.8 g/dL  4.3 g/dL 


 


Albumin   2.0 g/dL  1.9 g/dL 


 


Albumin/Globulin Ratio   0.7  0.8 


 


Lactic Acid Level    5.9 mmol/L 


 


Phosphorus Level    2.4 mg/dL 


 


Magnesium Level    1.8 mg/dL 








  BLOOD CULTURE  Final  





        GRAM NEGATIVE RODS SEEN IN 1 OF 2 BOTTLES (1 SET COLLECTED).


        


        CALLED TO ICU/[facility] AT 1318 08/17/20 BY SETH.


        


        CULTURE SENT TO [facility] FOR FURTHER WORKUP.


Imaging:


CT A/P


IMPRESSION:  


Redemonstration of extensive bilateral retroperitoneal and peritoneal 

collections, slightly increased in the left subdiaphragmatic region but 

otherwise similar in configuration to prior exams. Scattered areas of 

highattenuation material within these collections consistent with acute blood 

products.





PE:





GEN: exam deferred, family in room





A/P:


Pancreatitis, complications, s/p necrosectomy





--


Other per Dr. Bhatia.





Justicifation of Admission Dx:


Justifications for Admission:


Justification of Admission Dx:  Yes











RAGHAVENDRA MURCIA         Aug 17, 2020 13:36

## 2020-08-18 VITALS — DIASTOLIC BLOOD PRESSURE: 49 MMHG | SYSTOLIC BLOOD PRESSURE: 89 MMHG

## 2020-08-18 VITALS — SYSTOLIC BLOOD PRESSURE: 97 MMHG | DIASTOLIC BLOOD PRESSURE: 43 MMHG

## 2020-08-18 VITALS — DIASTOLIC BLOOD PRESSURE: 43 MMHG | SYSTOLIC BLOOD PRESSURE: 100 MMHG

## 2020-08-18 RX ADMIN — IPRATROPIUM BROMIDE AND ALBUTEROL SULFATE SCH ML: .5; 3 SOLUTION RESPIRATORY (INHALATION) at 00:00

## 2020-08-18 RX ADMIN — IPRATROPIUM BROMIDE AND ALBUTEROL SULFATE SCH ML: .5; 3 SOLUTION RESPIRATORY (INHALATION) at 03:16

## 2020-08-18 RX ADMIN — MORPHINE SULFATE PRN MG: 2 INJECTION, SOLUTION INTRAMUSCULAR; INTRAVENOUS at 14:02

## 2020-08-18 RX ADMIN — MORPHINE SULFATE PRN MG: 2 INJECTION, SOLUTION INTRAMUSCULAR; INTRAVENOUS at 11:02

## 2020-08-18 NOTE — NUR
This RN called [facility] due to patient being placed on Comfort Care. Talked to 
Ana to let her know about patient status changes. Without relaying patient's health 
information, Ana informed this RN to call back once patient has passed,not a candidate 
for donation.

## 2020-08-18 NOTE — NUR
Patient's TOD 1417 pronounced by ASIF Shore RN and AURELIO Proctor RN.  This RN notified Dr. Franco, Nursing Supervisor. Family at bedside, tearful. [facility] called and 
patient is not a candidate.

-------------------------------------------------------------------------------

Addendum: 08/18/20 at 1602 by ADILSON SHORE RN

-------------------------------------------------------------------------------

Patient's body taken to more. Patient's radio at nurse's station.  Ya notified of 
radio.

## 2020-08-18 NOTE — PDOC
[employer] PROGRESS NOTE


Date of Service


DOS:


DATE: 8/18/20 


TIME: 12:40





Chief Complaint


Chief Complaint


S/P Exp. 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 

parapsilosis & PSAE 07/26 (I-merrem/ceftazidime/AZT/cefepime))


Leucocytosis 


Fevers, intermittent


Acute gallstone pancreatitis with persistent necrosis


Acute hypoxic Respiratory failure required mechanical ventilation


Tracheostomy 


Bilateral pleural effusions/pulm edema s/p Throacentesis on 6/15/2020


HTN 


Intractable pain


Intractable nausea


Covid 19 negative


Acute on chronic anemia 


Abd distention - U/S and CT reviewed s/p 0.4 L of opaque, debris-containing a

scites was removed 5/6


Gallstone pancreatitis with necrosis. 


   -CT A/P 6/6 showed multiple pseudocysts, slight larger on the right. s/p 

drains x 3, 6/7.  + PSAE (MDRO-R Cefepime, Zosyn ANSON < 64) and yeast, 


   -s/p drain 4/27. C. parapsilosis. s/p drain 5/6 + yeast & high amylase; s/p 

additional drain on 5/8. Drains removed. 


Ascites s/p paracentesis 4/15 & 5/6. C. parapsilosis 


JED. off HD. 


A large fluid collection in the pancreatic bed has slightly decreased in size, 

described below, the pancreas itself is difficult to  visualize, which could be 

due to necrosis or obscuration of pancreatic  parenchyma from the surrounding 

fluid collection.6/15 


- 4/27 status post ROBERT drain placement + C paropsilosis. s/p additional drains 

5/8


Hypocalcemia 


Prediabetes


s/p trach


Hyperglycemia


Severe protein-caloric malnutrition


Extensive retroperitoneal fluid collections persist. Percutaneous  drains remain

within the collections in both paracolic gutters. These  communicate with 

additional pelvic and peripancreatic collections.





History of Present Illness


History of Present Illness


8/18/2020


Patient seen and examined


I discussed the case with her daughter her son her ex- and her mother and

cousins


Patient currently on comfort care and appears comfortable


Has agonal respirations











8/17/2022


Patient seen and examined


We now have had to transfer her back to the ICU she has decompensated again


She is bleeding from 1 of her drains


Her INR is above 3 (on its own)


She is had a couple of rapid responses in the past 48 hours


Her DPOA is present (Yessica her daughter)


Her mother is also present


Discussed with RN


Chart reviewed


I had a long discussion with the family regarding possibly withdrawing care and 

they are considering their options








08/16/2020


Patient seen and examined, appears encephalopathic 


Patient is largely nonverbal 


Discussed with RN 


Chart reviewed 











8/15  Patient without complaints. Per nurse reports some hypotension this am 

that improved spontaneously.


8/14  Will check Hb/Hct levels today in response to prbc; patient largely 

nonverbal but denies pain


8/13,   increased IV fluid, cont current


bolusing daily ,  LR increased


cr better,    Hgb worse today, will give 1 u PRBC





8/12.   pain in legs,    BP better,  some better Urine outptu





8/11.   dyspnea and mild distress worsened this AM.  but was able to walk some 

with PT later.


still  sometimes,    








08/10/2020


Patient seen and examined


Afebrile


Resting in NAD


Tachycardic and tachypneic overnight


Recurring leukocytosis, 23.0


Chart reviewed


Discussed with RN





08/9: Afebrile.  Calcium down to 10.1.  Little more tachycardic today with some 

more secretions. No O2 requirements. Does not wish to wear her speaking valve. 

Will check CXR.


8/8: Afebrile, weak, having more secretions not wearing a speaking valve today. 

WBC 10, Hb 8.8, , and K3.7, BUN 12, CR 0.5, calcium down to 10.3.  After 

zoledronic acid


8/7: Had a rash after calcitonin injection.  Discussed with nephrology with her 

calcium moving from 11.3-10.9 will give IV bisphosphonate today, zoledronic 

acid.


8/6: Hb stable. Afebrile. Weak. Calcium increased. Shortness of breath is 

improving. Plan for calcitonin today, lasix, and saline


8/5: Hemoglobin abnormally low on repeat has been stable 7.2.  Calcium up to 

10.9.  She is able to work with PT, she is asking to eat.  Social work is been 

having difficulties with both of her daughters completing the financial aspect 

of disability paperwork which is been prolonging her stay over the past 5 

months.  Plan to check PTH and to treat hypercalcemia with 1 L normal saline 40 

mg of IV Lasix and repeat labs in a.m.


8/4: Not wishing to wear her speaking valve. J tube having some difficulties. 

Afebrile. Rolf bedside to aid her. transferred from ICU today


8/3: No overnight events. Calcium 10.7 on AM labs. She is still a bit slow to to

respond, but follows commands well. Does not want speaking valve with me. Pain 

is better controlled in her abdomen. Wound care to see today. Will check liver 

function and phos levels given her calcium elevation.


8/2: Patient seen and examined bedside.  Positive fluid balance in the past 24 

hours.  Patient's chart, labs, images were reviewed and discussed with RN


8/1: No acute events overnight.  Seen and examined at bedside.  Patient had a 

fever 100.2.  Negative fluid balance of 150 cc.  Patient's chart, labs, images 

were reviewed and discussed with RN


7/31: Patient seen and examined at bedside.  No acute events overnight.  

Positive fluid balance 633.  Patient's chart, labs, images were reviewed and 

discussed with RN


7/30: Patient seen and examined bedside.  No acute events overnight.  Patient's 

chart, labs, images were reviewed and discussed with RN


7/28: Patient seen and examined bedside.  Patient appears to be in no obvious 

pain.  All drains have been adequately draining.  Patient continues to be on 

TPN.  Chart labs and imaging was reviewed.  Negative fluid balance of 270 cc


7/27:Patient seen and examined in the ICU.  Patient still requires extensive 

care.  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  Chart, labs, imaging was

reviewed.  UOP with + 500cc balance.


7/25: Patient seen in and examined in the ICU. She is still extremely critically

ill. Appears weak, frail, and pale. Better color today with improved eye contact

and expression. Discussed with RN. Chart reviewed


7/21: Patient seen and examined in the ICU, She is still extremely critically 

ill, Appears extremely weak frail and pale, Discussed with RN, Chart reviewed





Vitals/I&O


Vitals/I&O:





                                   Vital Signs








  Date Time  Temp Pulse Resp B/P (MAP) Pulse Ox O2 Delivery O2 Flow Rate FiO2


 


8/18/20 11:02   29  77 Nasal Cannula 1.0 


 


8/18/20 07:23  132  100/43 (62)    


 


8/18/20 04:07 98.4       





 98.4       














                                    I & O   


 


 8/17/20 8/17/20 8/18/20





 15:00 23:00 07:00


 


Intake Total 1125 ml 500 ml 0 ml


 


Output Total 75 ml 20 ml 450 ml


 


Balance 1050 ml 480 ml -450 ml











Physical Exam


Physical Exam:


GENERAL: Propped up in bed, her eyes are wide open, upward gaze, unresponsive to

verbal, + visual threat 


HEENT: Pupils equal, oral cavity dry. 


NECK:  Tracheostomy 


LUNGS: Diminished aeration bases,


HEART:  S1, S2, 


ABDOMEN: Distended, bowel sounds hypoactive, soft, GJ drain present, drainage 

bag in place - bloody drainage, + rectal tube 


: [place] in place 


EXTREMITIES: Generalized edema,


SKIN: warm touch. 


NEURO: - Eyes are open, unresponsive to verbal and tactile stimuli 


RUE  PICC site without signs of complications. PIV s clean


General:  Other (chronic ill, opens eyes)


Heart:  Normal S1, Normal S2, Other


Lungs:  Clear


Abdomen:  Soft, Other (wound with clots present)


Extremities:  No clubbing, No cyanosis, Other (Diffuse edema)


Skin:  No breakdown





Assessment and Plan


Assessmemt and Plan


Problems


Medical Problems:


(1) Acute pancreatitis


Status: Acute  





(2) Cholelithiasis


Status: Acute  





S/P Exp. 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 

parapsilosis & PSAE 07/26 (I-merrem/ceftazidime/AZT/cefepime))


Leucocytosis 


Fevers, intermittent


Acute gallstone pancreatitis with persistent necrosis


Acute hypoxic Respiratory failure required mechanical ventilation


Tracheostomy 


Bilateral pleural effusions/pulm edema s/p Throacentesis on 6/15/2020


HTN 


Intractable pain


Intractable nausea


Covid 19 negative


Acute on chronic anemia 


Abd distention - U/S and CT reviewed s/p 0.4 L of opaque, debris-containing 

ascites was removed 5/6


Gallstone pancreatitis with necrosis. 


   -CT A/P 6/6 showed multiple pseudocysts, slight larger on the right. s/p 

drains x 3, 6/7.  + PSAE (MDRO-R Cefepime, Zosyn ANSON < 64) and yeast, 


   -s/p drain 4/27. C. parapsilosis. s/p drain 5/6 + yeast & high amylase; s/p 

additional drain on 5/8. Drains removed. 


Ascites s/p paracentesis 4/15 & 5/6. C. parapsilosis 


JED. off HD. 


A large fluid collection in the pancreatic bed has slightly decreased in size, 

described below, the pancreas itself is difficult to  visualize, which could be 

due to necrosis or obscuration of pancreatic  parenchyma from the surrounding 

fluid collection.6/15 


- 4/27 status post ROBERT drain placement + C paropsilosis. s/p additional drains 

5/8


Hypocalcemia 


Prediabetes


s/p trach


Hyperglycemia


Severe protein-caloric malnutrition


Extensive retroperitoneal fluid collections persist. Percutaneous  drains remain

within the collections in both paracolic gutters. These  communicate with 

additional pelvic and peripancreatic collection





PLAN 


Comfort care


Prognosis terminal





Comment


Review of Relevant


I have reviewed the following items josy (where applicable) has been applied.


Medications:





Current Medications








 Medications


  (Trade)  Dose


 Ordered  Sig/Yee


 Route


 PRN Reason  Start Time


 Stop Time Status Last Admin


Dose Admin


 


 Sodium


 Bicarbonate 50


 meq/Sodium


 Chloride  1,050 ml @ 


 75 mls/hr  Q14H


 IV


   8/17/20 14:00


 8/17/20 17:07 DC 8/17/20 14:00





 


 Fentanyl Citrate  30 ml @ 0


 mls/hr  CONT  PRN


 IV


 SEE PROTOCOL  8/17/20 15:15


 8/17/20 18:45 DC 8/17/20 15:19





 


 Lorazepam


  (Ativan Inj)  4 mg  PRN Q1HR  PRN


 IVP


 ANXIETY / AGITATION  8/17/20 15:15


    8/18/20 01:16





 


 Fentanyl Citrate  55 ml @ 0


 mls/hr  CONT  PRN


 IV


 SEE PROTOCOL  8/17/20 19:00


    8/17/20 19:39





 


 Morphine Sulfate


  (Morphine


 Sulfate)  4 mg  PRN Q1HR  PRN


 IV


 PAIN  8/18/20 10:15


    8/18/20 11:02














Justicifation of Admission Dx:


Justifications for Admission:


Justification of Admission Dx:  Yes











HECTOR MASON III DO           Aug 18, 2020 12:42

## 2020-08-18 NOTE — NUR
SS following up with discharge planning. SS reviewed pt chart and discussed with pt RN. Pt 
DNR and comfort care now. SS will continue to follow as needed.

## 2020-08-18 NOTE — PDOC
DATE OF SERVICE


DATE: 8/18/20 


TIME: 09:53





SUBJECTIVE


ROS


Pt comfort measures now





OBJECTIVE


Vital Signs





Vital Signs








  Date Time  Temp Pulse Resp B/P (MAP) Pulse Ox O2 Delivery O2 Flow Rate FiO2


 


8/18/20 08:00      Nasal Cannula 2.0 


 


8/18/20 07:23  132 27 100/43 (62) 77   


 


8/18/20 04:07 98.4       





 98.4       








I & 0











Intake and Output 


 


 8/18/20





 07:00


 


Intake Total 1625 ml


 


Output Total 545 ml


 


Balance 1080 ml


 


 


 


Intake Oral 0 ml


 


IV Total 100 ml


 


Tube Feeding 200 ml


 


Blood Product 550 ml


 


Blood Product IV Normal Saline Flush 775 ml


 


Output Urine Total 245 ml


 


Drainage Total 300 ml











PHYSICAL EXAM


Physical Exam


GENERAL: unresponsive 


HEENT:OM dry  


NECK:  Tracheostomy +, 


LUNGS: Diminished aeration bases,


HEART:  S1, S2, 


ABDOMEN: Distended, bowel sounds hypoactive, soft, GJ drain present, drainage 

bag in place - bloody drainage, + rectal tube 


: [place] in place 


EXTREMITIES: Generalized edema,


SKIN: warm touch, no rash 


NEURO: - Unresponsive





DIAGNOSIS/ASSESSMENT


Assessment & Plan





JED- suspect ATN  


Had JED earlier during hospitalization , reqd CRRT/HD with resolution of JED  


Not a candidat for any further surgical interventions per GS , comfort measures 

per family 





HypoTensive  - Not on pressors currently  





AMS- unresponsive 





Acidosis- Has been recieving IV bicarb per Primary  , K normal 





HyperNatremia - monitor  





GI bleed - s/p PRBCs/FFP/vit K





Fever- Leukocytosis worse . on Abx 





HyperCalcemia-  Neph was reconsulted on 8/6  seen by me 


Resolved , No interim Neph fu  








Acute gallstone pancreatitis with persistent necrosis, Cholelithiasis with 

thickening of the gallbladder wall.


   S/P Exp. Lap, REN, naif, G-J tube & pancreatic necrosectomy on 6/30








Anemia- 2/2 GI bleed, GS following  





Acute hypoxic resp failure. trach- was on Vent, currently on 2 Lts O2  





Pleural effusion status post CTS left side- Bilateral pleural effusions/pulm 

edema s/p Throacentesis on 6/15/2020





 


Patient with prolonged hospitalization- 5 months


Multiple medical problems,Multiple surgical procedures


Comfort measures





COMMENT/RELEVANT DATA


Meds





Current Medications








 Medications


  (Trade)  Dose


 Ordered  Sig/Yee  Start Time


 Stop Time Status Last Admin


Dose Admin


 


 Acetaminophen


  (Tylenol Supp)  650 mg  PRN Q6HRS  PRN  3/24/20 10:30


    8/10/20 15:43


650 MG


 


 Acetaminophen


  (Tylenol)  650 mg  PRN Q6HRS  PRN  8/16/20 04:15


    8/16/20 21:02


650 MG


 


 Acetaminophen/


 Hydrocodone Bitart


  (Lortab 5/325)  1 tab  PRN Q4HRS  PRN  7/23/20 16:00


    8/15/20 14:27


1 TAB


 


 Acetylcysteine


  (Mucomyst 20%


 Resp Treatment)  600 mg  RTBID  6/27/20 12:00


    8/17/20 08:00


600 MG


 


 Albumin Human  100 ml @ 


 100 mls/hr  Q12H  8/15/20 18:00


 8/17/20 17:07 DC 8/17/20 06:05


100 MLS/HR


 


 Albuterol Sulfate


  (Ventolin Neb


 Soln)  2.5 mg  1X  ONCE  3/17/20 22:30


 3/17/20 22:31 DC 3/18/20 00:56


2.5 MG


 


 Albuterol/


 Ipratropium


  (Duoneb)  3 ml  Q4HRS  6/13/20 08:00


    8/17/20 12:21


3 ML


 


 Alprazolam


  (Xanax)  0.5 mg  PRN QID  PRN  7/23/20 16:00


    8/14/20 08:41


0.5 MG


 


 Alteplase,


 Recombinant


  (Cathflo For


 Central Catheter


 Clearance)  1 mg  1X  ONCE  8/5/20 07:00


 8/5/20 07:01 DC 8/5/20 09:30


1 MG


 


 Alteplase,


 Recombinant 4 mg/


 Sodium Chloride  20 ml @ 20


 mls/hr  1X  ONCE  6/17/20 10:00


 6/17/20 10:59 DC 6/17/20 10:09


20 MLS/HR


 


 Alteplase,


 Recombinant 5 mg/


 Sodium Chloride  30 ml @ 30


 mls/hr  1X  PRN  8/5/20 06:45


   UNV  





 


 Amino Acids/


 Glycerin/


 Electrolytes  1,000 ml @ 


 80 mls/hr  [id number]  7/26/20 10:15


 8/2/20 07:07 DC 8/1/20 18:10


80 MLS/HR


 


 Artificial Tears


  (Artificial


 Tears)  1 drop  PRN Q15MIN  PRN  4/29/20 05:30


    6/23/20 21:17


1 DROP


 


 Atenolol


  (Tenormin)  100 mg  DAILY  3/17/20 09:00


 3/16/20 20:08 DC  





 


 Atropine Sulfate


  (ATROPINE 0.5mg


 SYRINGE)  0.5 mg  PRN Q5MIN  PRN  4/2/20 08:15


 8/3/20 09:45 DC  





 


 Barium Sulfate


  (Varibar Thin


 Liquid Apple)  148 gm  1X  ONCE  5/26/20 11:45


 5/26/20 11:49 DC  





 


 Benzocaine


  (Hurricaine One)  1 spray  1X  ONCE  3/20/20 14:30


 3/20/20 14:31 DC 3/20/20 16:38


1 SPRAY


 


 Bisacodyl


  (Dulcolax Supp)  10 mg  STK-MED ONCE  4/27/20 10:59


 4/27/20 10:59 DC  





 


 Bumetanide


  (Bumex)  2 mg  DAILY  5/8/20 10:00


 5/18/20 17:15 DC 5/18/20 08:07


2 MG


 


 Bupivacaine HCl/


 Epinephrine Bitart


  (Sensorcain-Epi


 0.5%-1:685367 Mpf)  30 ml  STK-MED ONCE  6/30/20 08:34


 6/30/20 08:35 DC  





 


 Calcitonin Dugger


  (Miacalcin)  400 unit  BID  8/6/20 18:00


 8/7/20 15:56 DC 8/6/20 18:18


400 UNIT


 


 Calcium Carbonate/


 Glycine


  (Tums)  500 mg  PRN [id number]  PRN  3/18/20 17:45


 5/13/20 10:25 DC  





 


 Calcium Chloride


 1000 mg/Sodium


 Chloride  110 ml @ 


 220 mls/hr  1X  ONCE  3/17/20 22:30


 3/17/20 22:59 DC 3/17/20 22:11


220 MLS/HR


 


 Calcium Chloride


 3000 mg/Sodium


 Chloride  1,030 ml @ 


 50 mls/hr  [id number]  3/19/20 08:00


 3/21/20 15:23 DC 3/21/20 02:17


50 MLS/HR


 


 Calcium Gluconate


  (Calcium


 Gluconate)  1,000 mg  1X  ONCE  8/14/20 14:45


 8/14/20 14:53 DC 8/14/20 15:25


1,000 MG


 


 Calcium Gluconate


 1000 mg/Sodium


 Chloride  110 ml @ 


 220 mls/hr  1X  ONCE  3/18/20 03:30


 3/18/20 03:59 DC 3/18/20 03:21


220 MLS/HR


 


 Calcium Gluconate


 2000 mg/Sodium


 Chloride  120 ml @ 


 220 mls/hr  1X  ONCE  3/18/20 07:30


 3/18/20 08:02 DC 3/18/20 09:05


220 MLS/HR


 


 Cefepime HCl


  (Maxipime)  2 gm  Q12HR  8/10/20 10:00


 8/16/20 14:48 DC 8/16/20 09:32


2 GM


 


 Ceftazidime/


 Avibactam 0.94 gm/


 Sodium Chloride  250 ml @ 


 125 mls/hr  Q12HR  8/17/20 13:00


 8/17/20 17:07 DC  





 


 Ceftazidime/


 Avibactam 2.5 gm/


 Sodium Chloride  250 ml @ 


 125 mls/hr  Q8HRS  7/23/20 08:00


 8/5/20 08:20 DC 8/5/20 05:38


125 MLS/HR


 


 Cellulose


  (Surgicel


 Fibrillar 1x2)  1 each  STK-MED ONCE  4/6/20 11:00


 4/6/20 11:01 DC  





 


 Cellulose


  (Surgicel


 Hemostat 2x14)  1 each  STK-MED ONCE  4/27/20 10:58


 4/27/20 10:59 DC  





 


 Cellulose


  (Surgicel


 Hemostat 4x8)  1 each  STK-MED ONCE  4/27/20 10:58


 4/27/20 10:59 DC  





 


 Chlorhexidine


 Gluconate


  (Peridex)  15 ml  BID  6/13/20 09:00


 6/13/20 07:58 DC  





 


 Ciprofloxacin/


 Dextrose  200 ml @ 


 200 mls/hr  Q12HR  7/12/20 10:00


 7/21/20 08:20 DC 7/20/20 21:02


200 MLS/HR


 


 Cyclobenzaprine


 HCl


  (Flexeril)  10 mg  PRN Q6HRS  PRN  4/30/20 10:45


    8/15/20 14:25


10 MG


 


 Daptomycin 410 mg/


 Sodium Chloride  50 ml @ 


 100 mls/hr  Q24H  6/7/20 14:00


 6/10/20 08:30 DC 6/9/20 13:33


100 MLS/HR


 


 Daptomycin 430 mg/


 Sodium Chloride  50 ml @ 


 100 mls/hr  Q24H  4/25/20 13:00


 4/30/20 20:58 DC 4/30/20 13:00


100 MLS/HR


 


 Daptomycin 450 mg/


 Sodium Chloride  50 ml @ 


 100 mls/hr  Q24H  5/17/20 09:00


 5/21/20 08:30 DC 5/20/20 09:25


100 MLS/HR


 


 Daptomycin 480 mg/


 Sodium Chloride  50 ml @ 


 100 mls/hr  Q24H  8/16/20 16:00


 8/17/20 17:07 DC 8/16/20 18:02


100 MLS/HR


 


 Daptomycin 485 mg/


 Sodium Chloride  50 ml @ 


 100 mls/hr  Q24H  5/4/20 11:00


 5/12/20 07:44 DC 5/11/20 13:10


100 MLS/HR


 


 Daptomycin 500 mg/


 Sodium Chloride  50 ml @ 


 100 mls/hr  Q24H  7/26/20 09:00


 8/5/20 08:20 DC 8/4/20 09:20


100 MLS/HR


 


 Desflurane


  (Suprane)  90 ml  STK-MED ONCE  6/30/20 10:18


 6/30/20 10:19 DC  





 


 Dexamethasone


 Sodium Phosphate


  (Decadron)  4 mg  STK-MED ONCE  4/27/20 10:56


 4/27/20 10:57 DC  





 


 Dexmedetomidine


 HCl 400 mcg/


 Sodium Chloride  100 ml @ 0


 mls/hr  CONT  PRN  4/2/20 08:15


 5/30/20 18:31 DC 5/30/20 12:57


8 MLS/HR


 


 Dextrose


  (Dextrose


 50%-Water Syringe)  12.5 gm  PRN Q15MIN  PRN  3/16/20 09:30


 8/17/20 17:07 DC  





 


 Digoxin


  (Lanoxin)  125 mcg  1X  ONCE  3/19/20 18:00


 3/19/20 18:01 DC 3/19/20 17:10


125 MCG


 


 Diphenhydramine


 HCl


  (Benadryl Oral


 Elixir)  12.5 mg  1X PRN  PRN  8/17/20 01:30


     





 


 Diphenhydramine


 HCl


  (Benadryl)  25 mg  1X  ONCE  7/16/20 19:00


 7/16/20 19:01 DC 7/16/20 18:56


25 MG


 


 Duloxetine HCl


  (Cymbalta)  30 mg  DAILY  5/10/20 14:00


 5/13/20 10:25 DC 5/11/20 09:48


30 MG


 


 Enoxaparin Sodium


  (Lovenox 100mg


 Syringe)  100 mg  Q12HR  4/21/20 21:00


   UNV  





 


 Enoxaparin Sodium


  (Lovenox 40mg


 Syringe)  40 mg  Q24H  7/1/20 08:00


 8/17/20 17:07 DC 8/16/20 09:31


40 MG


 


 Ephedrine Sulfate


  (ePHEDrine PF IN


 SALINE SYRINGE)  50 mg  STK-MED ONCE  6/30/20 14:45


 6/30/20 14:45 DC  





 


 Etomidate


  (Amidate)  8 mg  1X  ONCE  3/23/20 08:30


 3/23/20 08:31 DC 3/23/20 08:33


8 MG


 


 Fentanyl


  (Duragesic 12mcg/


 Hr Patch)  1 patch  Q3DAYS  7/10/20 09:00


 8/17/20 17:07 DC 8/15/20 08:38


1 PATCH


 


 Fentanyl


  (Duragesic 50mcg/


 Hr Patch)  1 patch  Q72H  6/4/20 21:00


 6/13/20 12:00 DC 6/4/20 21:22


1 PATCH


 


 Fentanyl Citrate  55 ml @ 0


 mls/hr  CONT  PRN  8/17/20 19:00


    8/17/20 19:39


2 MLS/HR


 


 Fentanyl Citrate


  (Fentanyl 2ml


 Vial)  100 mcg  STK-MED ONCE  8/4/20 15:03


 8/4/20 15:03 DC  





 


 Fentanyl Citrate


  (Fentanyl 5ml


 Vial)  250 mcg  1X  ONCE  5/8/20 09:15


 5/8/20 09:16 DC 5/8/20 09:30


50 MCG


 


 Fluconazole/


 Sodium Chloride  50 ml @ 


 100 mls/hr  ONCE  ONCE  8/17/20 10:15


 8/17/20 17:07 DC 8/17/20 11:08


100 MLS/HR


 


 Flumazenil


  (Romazicon)  0.5 mg  STK-MED ONCE  6/7/20 14:48


 6/7/20 14:48 DC  





 


 Fluoxetine HCl


  (PROzac)  20 mg  QHS  6/4/20 21:00


 8/17/20 17:07 DC 8/16/20 21:02


20 MG


 


 Furosemide


  (Lasix)  40 mg  [id number]  8/6/20 09:00


 8/6/20 14:01 DC 8/6/20 13:51


40 MG


 


 Haloperidol


 Lactate


  (Haldol Inj)  3 mg  1X  ONCE  5/4/20 14:30


 5/4/20 14:31 DC 5/4/20 14:37


3 MG


 


 Heparin Sodium


  (Porcine)


  (Hep Lock Adult)  500 unit  STK-MED ONCE  4/7/20 09:29


 4/7/20 09:30 DC  





 


 Heparin Sodium


  (Porcine)


  (Heparin Sodium)  5,000 unit  Q12HR  4/27/20 21:00


 5/7/20 09:59 DC 5/6/20 20:57


5,000 UNIT


 


 Heparin Sodium


  (Porcine) 1000


 unit/Sodium


 Chloride  1,001 ml @ 


 1,001 mls/hr  1X  ONCE  6/30/20 06:00


 6/30/20 06:59 DC  





 


 Hydromorphone HCl


  (Dilaudid


 Standard PCA)  12 mg  STK-MED ONCE  5/1/20 15:50


 5/12/20 11:24 DC  





 


 Hydromorphone HCl


  (Dilaudid)  1 mg  PRN Q4HRS  PRN  5/4/20 19:00


 5/18/20 17:10 DC 5/18/20 06:25


1 MG


 


 Info


  (CONTRAST GIVEN


 -- Rx MONITORING)  1 each  PRN DAILY  PRN  7/21/20 11:45


 7/23/20 11:44 DC  





 


 Info


  (Icu Electrolyte


 Protocol)  1 ea  CONT PRN  PRN  3/29/20 13:15


 8/17/20 17:07 DC  





 


 Info


  (PHARMACY


 MONITORING -- do


 not chart)  1 each  PRN DAILY  PRN  4/24/20 15:45


 5/26/20 14:14 DC  





 


 Info


  (Tpn Per


 Pharmacy)  1 each  PRN DAILY  PRN  3/18/20 12:30


   UNV  





 


 Insulin Human


 Lispro


  (HumaLOG)  0-9 UNITS  Q6HRS  3/16/20 09:30


 8/17/20 17:07 DC 8/14/20 12:43


5 UNITS


 


 Insulin Human


 Regular


  (HumuLIN R VIAL)  5 unit  1X  ONCE  3/17/20 22:30


 3/17/20 22:31 DC 3/17/20 22:14


5 UNIT


 


 Iohexol


  (Omnipaque 240


 Mg/ml)  10 ml  1X  ONCE  8/4/20 15:45


 8/4/20 15:46 DC 8/4/20 15:00


10 ML


 


 Iohexol


  (Omnipaque 300


 Mg/ml)  50 ml  1X  ONCE  7/28/20 11:00


 7/28/20 11:01 DC  





 


 Iohexol


  (Omnipaque 350


 Mg/ml)  90 ml  1X  ONCE  3/16/20 03:30


 3/16/20 03:31 DC 3/16/20 03:25


90 ML


 


 Ketorolac


 Tromethamine


  (Toradol 30mg


 Vial)  30 mg  1X  ONCE  3/16/20 03:00


 3/16/20 03:01 DC 3/16/20 02:54


30 MG


 


 Lidocaine HCl


  (Buffered


 Lidocaine 1%)  5 ml  1X  ONCE  8/4/20 15:45


 8/4/20 15:46 DC 8/4/20 15:00


5 ML


 


 Lidocaine HCl


  (Glydo


  (Lidocaine) Jelly)  1 thomas  1X  ONCE  3/20/20 14:30


 3/20/20 14:31 DC 3/20/20 16:38


1 THOMAS


 


 Lidocaine HCl


  (Lidocaine 1%


 20ml Vial)  20 ml  1X  ONCE  6/7/20 15:00


 6/7/20 15:01 DC 6/7/20 15:30


20 ML


 


 Lidocaine HCl


  (Lidocaine Pf 2%


 Vial)  5 ml  STK-MED ONCE  6/30/20 07:44


 6/30/20 07:44 DC  





 


 Lidocaine HCl


  (Xylocaine-Mpf


 1% 2ml Vial)  2 ml  PRN 1X  PRN  4/27/20 07:00


 4/28/20 06:59 DC  





 


 Linezolid/Dextrose  300 ml @ 


 300 mls/hr  Q12HR  5/17/20 09:00


 5/20/20 08:11 DC 5/19/20 21:08


300 MLS/HR


 


 Lorazepam


  (Ativan Inj)  4 mg  PRN Q1HR  PRN  8/17/20 15:15


    8/18/20 01:16


2 MG


 


 Magnesium Sulfate  50 ml @ 25


 mls/hr  1X  ONCE  8/14/20 15:00


 8/14/20 16:59 DC 8/14/20 15:24


25 MLS/HR


 


 Meropenem 1 gm/


 Sodium Chloride  100 ml @ 


 200 mls/hr  Q12HR  6/7/20 21:00


 6/25/20 08:56 DC 6/25/20 08:27


200 MLS/HR


 


 Meropenem 500 mg/


 Sodium Chloride  50 ml @ 


 100 mls/hr  Q6HRS  8/16/20 16:00


 8/17/20 17:07 DC 8/17/20 12:54


100 MLS/HR


 


 Methylprednisolone


 Sodium Succinate


  (SOLU-Medrol


 125MG VIAL)  125 mg  1X  ONCE  6/13/20 06:15


 6/13/20 06:16 DC 6/13/20 06:26


125 MG


 


 Metoclopramide HCl


  (Reglan Vial)  10 mg  PRN Q3HRS  PRN  5/9/20 16:45


 8/17/20 17:07 DC 5/14/20 04:25


10 MG


 


 Metoprolol


 Tartrate


  (Lopressor Vial)  5 mg  1X  ONCE  8/9/20 10:45


 8/9/20 10:46 DC 8/9/20 10:54


5 MG


 


 Metronidazole  100 ml @ 


 100 mls/hr  Q12HR  8/10/20 10:00


 8/16/20 14:48 DC 8/16/20 09:31


100 MLS/HR


 


 Micafungin Sodium


 100 mg/Dextrose  100 ml @ 


 100 mls/hr  Q24H  8/16/20 15:00


 8/17/20 17:07 DC 8/16/20 15:29


100 MLS/HR


 


 Midazolam HCl


  (Versed)  2 mg  1X  ONCE  6/7/20 15:00


 6/7/20 15:01 DC 6/7/20 15:28


1 MG


 


 Midazolam HCl 100


 mg/Sodium Chloride  100 ml @ 1


 mls/hr  CONT  PRN  6/30/20 14:45


 8/3/20 09:45 DC 7/3/20 18:48


10 MLS/HR


 


 Midazolam HCl 50


 mg/Sodium Chloride  50 ml @ 0


 mls/hr  CONT  PRN  3/23/20 08:15


 3/28/20 15:59 DC 3/26/20 22:39


7 MLS/HR


 


 Morphine Sulfate


  (Morphine


 Sulfate)  1 mg  PRN Q1HR  PRN  6/30/20 14:45


 8/3/20 09:45 DC 7/25/20 18:28


1 MG


 


 Multi-Ingred


 Cream/Lotion/Oil/


 Oint


  (Artificial


 Tears Eye


 Ointment)  1 thomas  PRN Q1HR  PRN  3/25/20 17:30


 6/3/20 14:39 DC 4/13/20 08:19


1 THOMAS


 


 Multivitamins/


 Minerals


 Therapeutic


  (Centrum


 Multivit-Mineral


 Liq)  5 ml  DAILY  8/5/20 09:00


 8/17/20 17:07 DC 8/16/20 09:30


5 ML


 


 Naloxone HCl


  (Narcan)  0.4 mg  PRN Q2MIN  PRN  8/17/20 15:15


     





 


 Norepinephrine


 Bitartrate 8 mg/


 Dextrose  258 ml @ 


 13.332 mls/


 hr  CONT  PRN  6/7/20 06:30


 8/3/20 09:45 DC 7/2/20 09:09


1.6 MLS/HR


 


 Ondansetron HCl


  (Zofran)  4 mg  STK-MED ONCE  6/30/20 13:33


 6/30/20 13:33 DC  





 


 Pantoprazole


 Sodium


  (PROTONIX VIAL


 for IV PUSH)  40 mg  DAILYAC  3/16/20 11:30


 8/17/20 17:07 DC 8/17/20 08:57


40 MG


 


 Phenylephrine HCl


  (Ken-Synephrine


 Inj)  10 mg  STK-MED ONCE  6/30/20 13:33


 6/30/20 13:33 DC  





 


 Phenylephrine HCl


  (PHENYLEPHRINE


 in 0.9% NACL PF)  1 mg  STK-MED ONCE  6/30/20 14:44


 6/30/20 14:45 DC  





 


 Phytonadione


  (Vitamin K


 Ampule)  10 mg  1X  ONCE  8/17/20 02:00


 8/17/20 02:01 DC 8/17/20 01:44


10 MG


 


 Piperacillin Sod/


 Tazobactam Sod


 3.375 gm/Sodium


 Chloride  50 ml @ 


 100 mls/hr  Q6HRS  5/27/20 12:00


 6/4/20 07:26 DC 6/4/20 06:10


100 MLS/HR


 


 Piperacillin Sod/


 Tazobactam Sod


 4.5 gm/Sodium


 Chloride  100 ml @ 


 200 mls/hr  1X  ONCE  3/16/20 06:00


 3/16/20 06:29 DC 3/16/20 05:44


200 MLS/HR


 


 Potassium


 Chloride 110 meq/


 Magnesium Sulfate


 20 meq/


 Multivitamins 10


 ml/Chromium/


 Copper/Manganese/


 Seleni/Zn 1 ml/


 Insulin Human


 Regular 15 unit/


 Total Parenteral


 Nutrition/Amino


 Acids/Dextrose/


 Fat Emulsion


 Intravenous  1,800 ml @ 


 75 mls/hr  TPN  CONT  5/24/20 22:00


 5/25/20 21:59 DC 5/24/20 22:48


75 MLS/HR


 


 Potassium


 Chloride 15 meq/


 Bicarbonate


 Dialysis Soln w/


 out KCl  5,007.5 ml


  @ 1,000 mls/


 hr  Q5H1  3/29/20 20:00


 4/2/20 13:08 DC 4/1/20 18:14


1,000 MLS/HR


 


 Potassium


 Chloride 20 meq/


 Bicarbonate


 Dialysis Soln w/


 out KCl  5,010 ml @ 


 1,000 mls/hr  Q5H1M  3/25/20 16:00


 3/29/20 19:59 DC 3/29/20 14:54


1,000 MLS/HR


 


 Potassium


 Chloride 40 meq/


 Potassium Acetate


 60 meq/Magnesium


 Sulfate 10 meq/


 Multivitamins 10


 ml/Chromium/


 Copper/Manganese/


 Seleni/Zn 1 ml/


 Insulin Human


 Regular 20 unit/


 Total Parenteral


 Nutrition/Amino


 Acids/Dextrose/


 Fat Emulsion


 Intravenous  1,800 ml @ 


 75 mls/hr  TPN  CONT  6/4/20 22:00


 6/5/20 21:59 DC 6/5/20 00:03


75 MLS/HR


 


 Potassium


 Chloride 70 meq/


 Magnesium Sulfate


 20 meq/


 Multivitamins 10


 ml/Chromium/


 Copper/Manganese/


 Seleni/Zn 1 ml/


 Insulin Human


 Regular 15 unit/


 Total Parenteral


 Nutrition/Amino


 Acids/Dextrose/


 Fat Emulsion


 Intravenous  1,800 ml @ 


 75 mls/hr  TPN  CONT  5/29/20 22:00


 5/30/20 21:59 DC 5/29/20 23:13


75 MLS/HR


 


 Potassium


 Chloride 75 meq/


 Magnesium Sulfate


 15 meq/


 Multivitamins 10


 ml/Chromium/


 Copper/Manganese/


 Seleni/Zn 0.5 ml/


 Insulin Human


 Regular 15 unit/


 Total Parenteral


 Nutrition/Amino


 Acids/Dextrose/


 Fat Emulsion


 Intravenous  1,920 ml @ 


 80 mls/hr  TPN  CONT  5/9/20 22:00


 5/10/20 21:59 DC 5/9/20 22:41


80 MLS/HR


 


 Potassium


 Chloride 75 meq/


 Magnesium Sulfate


 15 meq/Calcium


 Gluconate 8 meq/


 Multivitamins 10


 ml/Chromium/


 Copper/Manganese/


 Seleni/Zn 0.5 ml/


 Insulin Human


 Regular 15 unit/


 Total Parenteral


 Nutrition/Amino


 Acids/Dextrose/


 Fat Emulsion


 Intravenous  1,920 ml @ 


 80 mls/hr  TPN  CONT  5/7/20 22:00


 5/8/20 21:59 DC 5/7/20 22:28


80 MLS/HR


 


 Potassium


 Chloride 75 meq/


 Magnesium Sulfate


 15 meq/Calcium


 Gluconate 8 meq/


 Multivitamins 10


 ml/Chromium/


 Copper/Manganese/


 Seleni/Zn 0.5 ml/


 Insulin Human


 Regular 20 unit/


 Total Parenteral


 Nutrition/Amino


 Acids/Dextrose/


 Fat Emulsion


 Intravenous  1,920 ml @ 


 80 mls/hr  TPN  CONT  5/6/20 22:00


 5/7/20 21:59 DC 5/6/20 22:00


80 MLS/HR


 


 Potassium


 Chloride 75 meq/


 Magnesium Sulfate


 15 meq/Calcium


 Gluconate 8 meq/


 Multivitamins 10


 ml/Chromium/


 Copper/Manganese/


 Seleni/Zn 0.5 ml/


 Insulin Human


 Regular 25 unit/


 Total Parenteral


 Nutrition/Amino


 Acids/Dextrose/


 Fat Emulsion


 Intravenous  1,920 ml @ 


 80 mls/hr  TPN  CONT  5/4/20 22:00


 5/5/20 21:59 DC 5/4/20 23:08


80 MLS/HR


 


 Potassium


 Chloride 75 meq/


 Magnesium Sulfate


 20 meq/Calcium


 Gluconate 10 meq/


 Multivitamins 10


 ml/Chromium/


 Copper/Manganese/


 Seleni/Zn 0.5 ml/


 Insulin Human


 Regular 25 unit/


 Total Parenteral


 Nutrition/Amino


 Acids/Dextrose/


 Fat Emulsion


 Intravenous  1,920 ml @ 


 80 mls/hr  TPN  CONT  5/3/20 22:00


 5/4/20 21:59 DC 5/3/20 22:04


80 MLS/HR


 


 Potassium


 Chloride 75 meq/


 Magnesium Sulfate


 20 meq/Calcium


 Gluconate 10 meq/


 Multivitamins 10


 ml/Chromium/


 Copper/Manganese/


 Seleni/Zn 0.5 ml/


 Insulin Human


 Regular 30 unit/


 Total Parenteral


 Nutrition/Amino


 Acids/Dextrose/


 Fat Emulsion


 Intravenous  1,920 ml @ 


 80 mls/hr  TPN  CONT  5/2/20 22:00


 5/3/20 22:00 DC 5/2/20 21:51


80 MLS/HR


 


 Potassium


 Chloride 80 meq/


 Magnesium Sulfate


 20 meq/


 Multivitamins 10


 ml/Chromium/


 Copper/Manganese/


 Seleni/Zn 0.5 ml/


 Insulin Human


 Regular 15 unit/


 Total Parenteral


 Nutrition/Amino


 Acids/Dextrose/


 Fat Emulsion


 Intravenous  1,920 ml @ 


 80 mls/hr  TPN  CONT  5/12/20 22:00


 5/13/20 21:59 DC 5/12/20 21:40


80 MLS/HR


 


 Potassium


 Chloride 80 meq/


 Magnesium Sulfate


 20 meq/


 Multivitamins 10


 ml/Chromium/


 Copper/Manganese/


 Seleni/Zn 1 ml/


 Insulin Human


 Regular 15 unit/


 Total Parenteral


 Nutrition/Amino


 Acids/Dextrose/


 Fat Emulsion


 Intravenous  1,800 ml @ 


 75 mls/hr  TPN  CONT  5/31/20 22:00


 6/1/20 21:59 DC 5/31/20 21:54


75 MLS/HR


 


 Potassium


 Chloride 90 meq/


 Magnesium Sulfate


 20 meq/


 Multivitamins 10


 ml/Chromium/


 Copper/Manganese/


 Seleni/Zn 1 ml/


 Insulin Human


 Regular 15 unit/


 Total Parenteral


 Nutrition/Amino


 Acids/Dextrose/


 Fat Emulsion


 Intravenous  1,800 ml @ 


 75 mls/hr  TPN  CONT  5/20/20 22:00


 5/21/20 21:59 DC 5/20/20 22:28


75 MLS/HR


 


 Potassium


 Chloride 90 meq/


 Magnesium Sulfate


 20 meq/


 Multivitamins 10


 ml/Chromium/


 Copper/Manganese/


 Seleni/Zn 1 ml/


 Insulin Human


 Regular 20 unit/


 Total Parenteral


 Nutrition/Amino


 Acids/Dextrose/


 Fat Emulsion


 Intravenous  1,800 ml @ 


 75 mls/hr  TPN  CONT  6/3/20 22:00


 6/4/20 21:59 DC 6/3/20 23:13


75 MLS/HR


 


 Potassium


 Chloride/Water  100 ml @ 


 100 mls/hr  Q1H  8/14/20 15:00


 8/14/20 16:59 DC 8/14/20 17:09


100 MLS/HR


 


 Potassium


 Phosphate 20 mmol/


 Sodium Chloride  106.6667


 ml @ 


 51.667 m...  1X  ONCE  3/25/20 13:00


 3/25/20 15:03 DC 3/25/20 12:51


51.667 MLS/HR


 


 Potassium Acetate


 30 meq/Magnesium


 Sulfate 14 meq/


 Multivitamins 10


 ml/Chromium/


 Copper/Manganese/


 Seleni/Zn 1 ml/


 Insulin Human


 Regular 15 unit/


 Sodium Chloride


 20 meq/Potassium


 Chloride 30 meq/


 Total Parenteral


 Nutrition/Amino


 Acids/Dextrose/


 Fat Emulsion


 Intravenous  1,920 ml @ 


 80 mls/hr  TPN  CONT  6/23/20 22:00


 6/24/20 21:59 DC 6/23/20 21:46


80 MLS/HR


 


 Potassium Acetate


 30 meq/Magnesium


 Sulfate 20 meq/


 Calcium Gluconate


 10 meq/


 Multivitamins 10


 ml/Chromium/


 Copper/Manganese/


 Seleni/Zn 0.5 ml/


 Insulin Human


 Regular 30 unit/


 Potassium


 Chloride 30 meq/


 Total Parenteral


 Nutrition/Amino


 Acids/Dextrose/


 Fat Emulsion


 Intravenous  1,920 ml @ 


 80 mls/hr  TPN  CONT  5/1/20 22:00


 5/2/20 21:59 DC 5/1/20 22:34


80 MLS/HR


 


 Potassium Acetate


 40 meq/Magnesium


 Sulfate 10 meq/


 Multivitamins 10


 ml/Chromium/


 Copper/Manganese/


 Seleni/Zn 1 ml/


 Insulin Human


 Regular 20 unit/


 Total Parenteral


 Nutrition/Amino


 Acids/Dextrose/


 Fat Emulsion


 Intravenous  1,920 ml @ 


 80 mls/hr  TPN  CONT  6/16/20 22:00


 6/17/20 21:59 DC 6/16/20 21:32


80 MLS/HR


 


 Potassium Acetate


 40 meq/Magnesium


 Sulfate 5 meq/


 Multivitamins 10


 ml/Chromium/


 Copper/Manganese/


 Seleni/Zn 1 ml/


 Insulin Human


 Regular 30 unit/


 Total Parenteral


 Nutrition/Amino


 Acids/Dextrose/


 Fat Emulsion


 Intravenous  1,920 ml @ 


 80 mls/hr  TPN  CONT  6/15/20 22:00


 6/16/20 19:34 DC 6/15/20 21:54


80 MLS/HR


 


 Potassium Acetate


 55 meq/Magnesium


 Sulfate 20 meq/


 Calcium Gluconate


 10 meq/


 Multivitamins 10


 ml/Chromium/


 Copper/Manganese/


 Seleni/Zn 0.5 ml/


 Insulin Human


 Regular 30 unit/


 Total Parenteral


 Nutrition/Amino


 Acids/Dextrose/


 Fat Emulsion


 Intravenous  1,920 ml @ 


 80 mls/hr  TPN  CONT  4/30/20 22:00


 5/1/20 21:59 DC 5/1/20 01:00


80 MLS/HR


 


 Potassium Acetate


 55 meq/Magnesium


 Sulfate 20 meq/


 Calcium Gluconate


 10 meq/


 Multivitamins 10


 ml/Chromium/


 Copper/Manganese/


 Seleni/Zn 0.5 ml/


 Insulin Human


 Regular 35 unit/


 Total Parenteral


 Nutrition/Amino


 Acids/Dextrose/


 Fat Emulsion


 Intravenous  1,920 ml @ 


 80 mls/hr  TPN  CONT  4/28/20 22:00


 4/29/20 21:59 DC 4/28/20 22:02


80 MLS/HR


 


 Potassium Acetate


 60 meq/Magnesium


 Sulfate 10 meq/


 Multivitamins 10


 ml/Chromium/


 Copper/Manganese/


 Seleni/Zn 1 ml/


 Insulin Human


 Regular 20 unit/


 Total Parenteral


 Nutrition/Amino


 Acids/Dextrose/


 Fat Emulsion


 Intravenous  1,920 ml @ 


 80 mls/hr  TPN  CONT  6/17/20 22:00


 6/18/20 21:59 DC 6/17/20 21:55


80 MLS/HR


 


 Potassium Acetate


 60 meq/Magnesium


 Sulfate 14 meq/


 Multivitamins 10


 ml/Chromium/


 Copper/Manganese/


 Seleni/Zn 1 ml/


 Insulin Human


 Regular 15 unit/


 Sodium Chloride


 20 meq/Total


 Parenteral


 Nutrition/Amino


 Acids/Dextrose/


 Fat Emulsion


 Intravenous  1,920 ml @ 


 80 mls/hr  TPN  CONT  6/22/20 22:00


 6/23/20 21:59 DC 6/22/20 21:54


80 MLS/HR


 


 Potassium Acetate


 60 meq/Magnesium


 Sulfate 14 meq/


 Multivitamins 10


 ml/Chromium/


 Copper/Manganese/


 Seleni/Zn 1 ml/


 Insulin Human


 Regular 15 unit/


 Total Parenteral


 Nutrition/Amino


 Acids/Dextrose/


 Fat Emulsion


 Intravenous  1,920 ml @ 


 80 mls/hr  TPN  CONT  6/21/20 22:00


 6/22/20 21:59 DC 6/21/20 22:22


80 MLS/HR


 


 Potassium Acetate


 60 meq/Magnesium


 Sulfate 14 meq/


 Multivitamins 10


 ml/Chromium/


 Copper/Manganese/


 Seleni/Zn 1 ml/


 Insulin Human


 Regular 20 unit/


 Total Parenteral


 Nutrition/Amino


 Acids/Dextrose/


 Fat Emulsion


 Intravenous  1,920 ml @ 


 80 mls/hr  TPN  CONT  6/18/20 22:00


 6/19/20 21:59 DC 6/18/20 22:26


80 MLS/HR


 


 Potassium Acetate


 60 meq/Magnesium


 Sulfate 5 meq/


 Multivitamins 10


 ml/Chromium/


 Copper/Manganese/


 Seleni/Zn 1 ml/


 Insulin Human


 Regular 30 unit/


 Total Parenteral


 Nutrition/Amino


 Acids/Dextrose/


 Fat Emulsion


 Intravenous  1,920 ml @ 


 80 mls/hr  TPN  CONT  6/6/20 22:00


 6/7/20 21:59 DC 6/6/20 21:54


80 MLS/HR


 


 Potassium Acetate


 65 meq/Magnesium


 Sulfate 20 meq/


 Calcium Gluconate


 10 meq/


 Multivitamins 10


 ml/Chromium/


 Copper/Manganese/


 Seleni/Zn 0.5 ml/


 Insulin Human


 Regular 30 unit/


 Total Parenteral


 Nutrition/Amino


 Acids/Dextrose/


 Fat Emulsion


 Intravenous  1,920 ml @ 


 80 mls/hr  TPN  CONT  4/29/20 22:00


 4/30/20 21:59 DC 4/29/20 22:22


80 MLS/HR


 


 Potassium Acetate


 80 meq/Magnesium


 Sulfate 5 meq/


 Multivitamins 10


 ml/Chromium/


 Copper/Manganese/


 Seleni/Zn 1 ml/


 Insulin Human


 Regular 20 unit/


 Total Parenteral


 Nutrition/Amino


 Acids/Dextrose/


 Fat Emulsion


 Intravenous  1,920 ml @ 


 80 mls/hr  TPN  CONT  6/5/20 22:00


 6/6/20 21:59 DC 6/5/20 21:59


80 MLS/HR


 


 Prochlorperazine


 Edisylate


  (Compazine)  5 mg  PACU PRN  PRN  4/27/20 07:00


 4/28/20 06:59 DC  





 


 Propofol


  (Diprivan)  200 mg  STK-MED ONCE  6/30/20 07:44


 6/30/20 07:44 DC  





 


 Ringer's Solution  1,000 ml @ 


 75 mls/hr  [id number]  8/15/20 23:00


 8/17/20 17:07 DC 8/17/20 13:29


75 MLS/HR


 


 Rocuronium Bromide


  (Zemuron)  100 mg  STK-MED ONCE  6/30/20 07:44


 6/30/20 07:44 DC  





 


 Saliva Substitute


  (Biotene


 Moisturizing


 Mouth)  2 spray  PRN Q15MIN  PRN  5/21/20 11:00


     





 


 Sevoflurane


  (Ultane)  60 ml  STK-MED ONCE  4/27/20 12:26


 4/27/20 12:27 DC  





 


 Sodium


 Bicarbonate 150


 meq/Dextrose  1,150 ml @ 


 75 mls/hr  1X  ONCE  6/30/20 16:30


 7/1/20 07:49 DC 6/30/20 20:02


75 MLS/HR


 


 Sodium


 Bicarbonate 150


 meq/Sterile Water  1,150 ml @ 


 500 mls/hr  1X  ONCE  8/14/20 15:00


 8/14/20 17:17 DC 8/14/20 15:23


500 MLS/HR


 


 Sodium


 Bicarbonate 50


 meq/Sodium


 Chloride  1,050 ml @ 


 75 mls/hr  Q14H  8/17/20 14:00


 8/17/20 17:07 DC 8/17/20 14:00


75 MLS/HR


 


 Sodium Acetate 50


 meq/Potassium


 Acetate 55 meq/


 Magnesium Sulfate


 20 meq/Calcium


 Gluconate 10 meq/


 Multivitamins 10


 ml/Chromium/


 Copper/Manganese/


 Seleni/Zn 0.5 ml/


 Insulin Human


 Regular 35 unit/


 Total Parenteral


 Nutrition/Amino


 Acids/Dextrose/


 Fat Emulsion


 Intravenous  1,800 ml @ 


 75 mls/hr  TPN  CONT  4/25/20 22:00


 4/26/20 21:59 DC 4/25/20 22:03


75 MLS/HR


 


 Sodium Bicarbonate


  (Sodium Bicarb


 Adult 8.4% Syr)  50 meq  1X  ONCE  8/17/20 04:15


 8/17/20 04:16 DC 8/17/20 04:14


50 MEQ


 


 Sodium Bicarbonate


  (Sodium Bicarb


 Ped 8.4% Syr)  50 meq  1X  ONCE  8/17/20 04:15


 8/17/20 04:16 UNV  





 


 Sodium Chloride  1,000 ml @ 


 25 mls/hr  Q24H  8/17/20 15:06


 8/17/20 17:07 DC  





 


 Sodium Chloride


  (Normal Saline


 Flush)  3 ml  QSHIFT  PRN  6/30/20 14:45


 8/3/20 09:45 DC  





 


 Sodium Chloride


 80 meq/Potassium


 Chloride 30 meq/


 Potassium Acetate


 30 meq/Magnesium


 Sulfate 14 meq/


 Multivitamins 10


 ml/Chromium/


 Copper/Manganese/


 Seleni/Zn 1 ml/


 Insulin Human


 Regular 15 unit/


 Total Parenteral


 Nutrition/Amino


 Acids/Dextrose/


 Fat Emulsion


 Intravenous  1,920 ml @ 


 80 mls/hr  TPN  CONT  7/1/20 22:00


 7/2/20 21:59 DC 7/1/20 23:05


80 MLS/HR


 


 Sodium Chloride


 90 meq/Calcium


 Gluconate 10 meq/


 Multivitamins 10


 ml/Chromium/


 Copper/Manganese/


 Seleni/Zn 0.5 ml/


 Total Parenteral


 Nutrition/Amino


 Acids/Dextrose/


 Fat Emulsion


 Intravenous  1,512 ml @ 


 63 mls/hr  TPN  CONT  3/18/20 22:00


 3/19/20 21:59 DC 3/18/20 22:06


63 MLS/HR


 


 Sodium Chloride


 90 meq/Calcium


 Gluconate 10 meq/


 Multivitamins 10


 ml/Chromium/


 Copper/Manganese/


 Seleni/Zn 1 ml/


 Total Parenteral


 Nutrition/Amino


 Acids/Dextrose/


 Fat Emulsion


 Intravenous  55.005 ml


  @ 2.292


 mls/hr  TPN  CONT  3/18/20 22:00


 3/18/20 12:33 DC  





 


 Sodium Chloride


 90 meq/Magnesium


 Sulfate 10 meq/


 Calcium Gluconate


 20 meq/


 Multivitamins 10


 ml/Chromium/


 Copper/Manganese/


 Seleni/Zn 0.5 ml/


 Total Parenteral


 Nutrition/Amino


 Acids/Dextrose/


 Fat Emulsion


 Intravenous  1,512 ml @ 


 63 mls/hr  TPN  CONT  3/19/20 22:00


 3/20/20 21:59 DC 3/19/20 22:25


63 MLS/HR


 


 Sodium Chloride


 90 meq/Magnesium


 Sulfate 12 meq/


 Calcium Gluconate


 15 meq/


 Multivitamins 10


 ml/Chromium/


 Copper/Manganese/


 Seleni/Zn 0.5 ml/


 Insulin Human


 Regular 25 unit/


 Total Parenteral


 Nutrition/Amino


 Acids/Dextrose/


 Fat Emulsion


 Intravenous  1,400 ml @ 


 58.333 mls/


 hr  TPN  CONT  4/8/20 22:00


 4/9/20 21:59 DC 4/8/20 21:41


58.333 MLS/HR


 


 Sodium Chloride


 90 meq/Potassium


 Chloride 15 meq/


 Magnesium Sulfate


 12 meq/Calcium


 Gluconate 15 meq/


 Multivitamins 10


 ml/Chromium/


 Copper/Manganese/


 Seleni/Zn 0.5 ml/


 Insulin Human


 Regular 25 unit/


 Total Parenteral


 Nutrition/Amino


 Acids/Dextrose/


 Fat Emulsion


 Intravenous  1,400 ml @ 


 58.333 mls/


 hr  TPN  CONT  4/7/20 22:00


 4/8/20 21:59 DC 4/7/20 22:13


58.333 MLS/HR


 


 Sodium Chloride


 90 meq/Potassium


 Chloride 15 meq/


 Potassium


 Phosphate 10 mmol/


 Magnesium Sulfate


 8 meq/Calcium


 Gluconate 15 meq/


 Multivitamins 10


 ml/Chromium/


 Copper/Manganese/


 Seleni/Zn 0.5 ml/


 Insulin Human


 Regular 25 unit/


 Total Parenteral


 Nutrition/Amino


 Acids/Dextrose/


 Fat Emulsion


 Intravenous  1,400 ml @ 


 58.333 mls/


 hr  TPN  CONT  4/5/20 22:00


 4/6/20 21:59 DC 4/5/20 21:20


58.333 MLS/HR


 


 Sodium Chloride


 90 meq/Potassium


 Chloride 15 meq/


 Potassium


 Phosphate 10 mmol/


 Magnesium Sulfate


 10 meq/Calcium


 Gluconate 20 meq/


 Multivitamins 10


 ml/Chromium/


 Copper/Manganese/


 Seleni/Zn 0.5 ml/


 Total Parenteral


 Nutrition/Amino


 Acids/Dextrose/


 Fat Emulsion


 Intravenous  1,400 ml @ 


 58.333 mls/


 hr  TPN  CONT  3/23/20 22:00


 3/24/20 21:59 DC 3/23/20 21:42


58.333 MLS/HR


 


 Sodium Chloride


 90 meq/Potassium


 Chloride 15 meq/


 Potassium


 Phosphate 10 mmol/


 Magnesium Sulfate


 12 meq/Calcium


 Gluconate 15 meq/


 Multivitamins 10


 ml/Chromium/


 Copper/Manganese/


 Seleni/Zn 0.5 ml/


 Insulin Human


 Regular 25 unit/


 Total Parenteral


 Nutrition/Amino


 Acids/Dextrose/


 Fat Emulsion


 Intravenous  1,400 ml @ 


 58.333 mls/


 hr  TPN  CONT  4/6/20 22:00


 4/7/20 21:59 DC 4/6/20 22:24


58.333 MLS/HR


 


 Sodium Chloride


 90 meq/Potassium


 Chloride 15 meq/


 Potassium


 Phosphate 15 mmol/


 Magnesium Sulfate


 10 meq/Calcium


 Gluconate 15 meq/


 Multivitamins 10


 ml/Chromium/


 Copper/Manganese/


 Seleni/Zn 0.5 ml/


 Total Parenteral


 Nutrition/Amino


 Acids/Dextrose/


 Fat Emulsion


 Intravenous  1,400 ml @ 


 58.333 mls/


 hr  TPN  CONT  3/24/20 22:00


 3/25/20 21:59 DC 3/24/20 22:17


58.333 MLS/HR


 


 Sodium Chloride


 90 meq/Potassium


 Chloride 15 meq/


 Potassium


 Phosphate 15 mmol/


 Magnesium Sulfate


 10 meq/Calcium


 Gluconate 20 meq/


 Multivitamins 10


 ml/Chromium/


 Copper/Manganese/


 Seleni/Zn 0.5 ml/


 Total Parenteral


 Nutrition/Amino


 Acids/Dextrose/


 Fat Emulsion


 Intravenous  1,200 ml @ 


 50 mls/hr  TPN  CONT  3/22/20 22:00


 3/22/20 14:17 DC  





 


 Sodium Chloride


 90 meq/Potassium


 Chloride 15 meq/


 Potassium


 Phosphate 18 mmol/


 Magnesium Sulfate


 8 meq/Calcium


 Gluconate 15 meq/


 Multivitamins 10


 ml/Chromium/


 Copper/Manganese/


 Seleni/Zn 0.5 ml/


 Insulin Human


 Regular 10 unit/


 Total Parenteral


 Nutrition/Amino


 Acids/Dextrose/


 Fat Emulsion


 Intravenous  1,400 ml @ 


 58.333 mls/


 hr  TPN  CONT  3/27/20 22:00


 3/28/20 21:59 DC 3/27/20 21:43


58.333 MLS/HR


 


 Sodium Chloride


 90 meq/Potassium


 Chloride 15 meq/


 Potassium


 Phosphate 18 mmol/


 Magnesium Sulfate


 8 meq/Calcium


 Gluconate 15 meq/


 Multivitamins 10


 ml/Chromium/


 Copper/Manganese/


 Seleni/Zn 0.5 ml/


 Insulin Human


 Regular 15 unit/


 Total Parenteral


 Nutrition/Amino


 Acids/Dextrose/


 Fat Emulsion


 Intravenous  1,400 ml @ 


 58.333 mls/


 hr  TPN  CONT  3/30/20 22:00


 3/31/20 21:59 DC 3/30/20 21:47


58.333 MLS/HR


 


 Sodium Chloride


 90 meq/Potassium


 Chloride 15 meq/


 Potassium


 Phosphate 18 mmol/


 Magnesium Sulfate


 8 meq/Calcium


 Gluconate 15 meq/


 Multivitamins 10


 ml/Chromium/


 Copper/Manganese/


 Seleni/Zn 0.5 ml/


 Insulin Human


 Regular 20 unit/


 Total Parenteral


 Nutrition/Amino


 Acids/Dextrose/


 Fat Emulsion


 Intravenous  1,400 ml @ 


 58.333 mls/


 hr  TPN  CONT  4/2/20 22:00


 4/3/20 21:59 DC 4/2/20 22:45


58.333 MLS/HR


 


 Sodium Chloride


 90 meq/Potassium


 Chloride 15 meq/


 Potassium


 Phosphate 18 mmol/


 Magnesium Sulfate


 8 meq/Calcium


 Gluconate 15 meq/


 Multivitamins 10


 ml/Chromium/


 Copper/Manganese/


 Seleni/Zn 0.5 ml/


 Total Parenteral


 Nutrition/Amino


 Acids/Dextrose/


 Fat Emulsion


 Intravenous  1,400 ml @ 


 58.333 mls/


 hr  TPN  CONT  3/26/20 22:00


 3/27/20 21:59 DC 3/26/20 22:00


58.333 MLS/HR


 


 Sodium Chloride


 90 meq/Potassium


 Chloride 30 meq/


 Potassium Acetate


 30 meq/Magnesium


 Sulfate 15 meq/


 Multivitamins 10


 ml/Chromium/


 Copper/Manganese/


 Seleni/Zn 1 ml/


 Insulin Human


 Regular 15 unit/


 Total Parenteral


 Nutrition/Amino


 Acids/Dextrose/


 Fat Emulsion


 Intravenous  1,680 ml @ 


 70 mls/hr  TPN  CONT  7/16/20 22:00


 7/17/20 21:59 DC 7/16/20 22:06


70 MLS/HR


 


 Sodium Chloride


 90 meq/Potassium


 Phosphate 15 mmol/


 Magnesium Sulfate


 12 meq/Calcium


 Gluconate 15 meq/


 Multivitamins 10


 ml/Chromium/


 Copper/Manganese/


 Seleni/Zn 0.5 ml/


 Insulin Human


 Regular 30 unit/


 Total Parenteral


 Nutrition/Amino


 Acids/Dextrose/


 Fat Emulsion


 Intravenous  1,400 ml @ 


 58.333 mls/


 hr  TPN  CONT  4/10/20 22:00


 4/11/20 21:59 DC 4/10/20 21:49


58.333 MLS/HR


 


 Sodium Chloride


 90 meq/Potassium


 Phosphate 15 mmol/


 Magnesium Sulfate


 12 meq/Calcium


 Gluconate 15 meq/


 Multivitamins 10


 ml/Chromium/


 Copper/Manganese/


 Seleni/Zn 0.5 ml/


 Insulin Human


 Regular 40 unit/


 Total Parenteral


 Nutrition/Amino


 Acids/Dextrose/


 Fat Emulsion


 Intravenous  1,400 ml @ 


 58.333 mls/


 hr  TPN  CONT  4/11/20 22:00


 4/12/20 21:59 DC 4/11/20 21:21


58.333 MLS/HR


 


 Sodium Chloride


 90 meq/Potassium


 Phosphate 19 mmol/


 Magnesium Sulfate


 12 meq/Calcium


 Gluconate 15 meq/


 Multivitamins 10


 ml/Chromium/


 Copper/Manganese/


 Seleni/Zn 0.5 ml/


 Insulin Human


 Regular 40 unit/


 Total Parenteral


 Nutrition/Amino


 Acids/Dextrose/


 Fat Emulsion


 Intravenous  1,400 ml @ 


 58.333 mls/


 hr  TPN  CONT  4/12/20 22:00


 4/13/20 21:59 DC 4/12/20 21:54


58.333 MLS/HR


 


 Sodium Chloride


 90 meq/Potassium


 Phosphate 5 mmol/


 Magnesium Sulfate


 12 meq/Calcium


 Gluconate 15 meq/


 Multivitamins 10


 ml/Chromium/


 Copper/Manganese/


 Seleni/Zn 0.5 ml/


 Insulin Human


 Regular 30 unit/


 Total Parenteral


 Nutrition/Amino


 Acids/Dextrose/


 Fat Emulsion


 Intravenous  1,400 ml @ 


 58.333 mls/


 hr  TPN  CONT  4/9/20 22:00


 4/10/20 21:59 DC 4/9/20 22:08


58.333 MLS/HR


 


 Sodium Chloride


 100 meq/Potassium


 Chloride 30 meq/


 Potassium Acetate


 30 meq/Magnesium


 Sulfate 12 meq/


 Multivitamins 10


 ml/Chromium/


 Copper/Manganese/


 Seleni/Zn 1 ml/


 Insulin Human


 Regular 15 unit/


 Total Parenteral


 Nutrition/Amino


 Acids/Dextrose/


 Fat Emulsion


 Intravenous  1,680 ml @ 


 70 mls/hr  TPN  CONT  7/5/20 22:00


 7/6/20 21:59 DC 7/5/20 21:23


70 MLS/HR


 


 Sodium Chloride


 100 meq/Potassium


 Chloride 40 meq/


 Magnesium Sulfate


 15 meq/Calcium


 Gluconate 15 meq/


 Multivitamins 10


 ml/Chromium/


 Copper/Manganese/


 Seleni/Zn 0.5 ml/


 Insulin Human


 Regular 35 unit/


 Total Parenteral


 Nutrition/Amino


 Acids/Dextrose/


 Fat Emulsion


 Intravenous  1,400 ml @ 


 58.333 mls/


 hr  TPN  CONT  4/19/20 22:00


 4/20/20 21:59 DC 4/19/20 22:46


58.333 MLS/HR


 


 Sodium Chloride


 100 meq/Potassium


 Chloride 40 meq/


 Magnesium Sulfate


 20 meq/Calcium


 Gluconate 10 meq/


 Multivitamins 10


 ml/Chromium/


 Copper/Manganese/


 Seleni/Zn 0.5 ml/


 Insulin Human


 Regular 35 unit/


 Total Parenteral


 Nutrition/Amino


 Acids/Dextrose/


 Fat Emulsion


 Intravenous  1,400 ml @ 


 58.333 mls/


 hr  TPN  CONT  4/23/20 22:00


 4/24/20 21:59 DC 4/24/20 00:06


58.333 MLS/HR


 


 Sodium Chloride


 100 meq/Potassium


 Chloride 40 meq/


 Magnesium Sulfate


 20 meq/Calcium


 Gluconate 15 meq/


 Multivitamins 10


 ml/Chromium/


 Copper/Manganese/


 Seleni/Zn 0.5 ml/


 Insulin Human


 Regular 35 unit/


 Total Parenteral


 Nutrition/Amino


 Acids/Dextrose/


 Fat Emulsion


 Intravenous  1,400 ml @ 


 58.333 mls/


 hr  TPN  CONT  4/22/20 22:00


 4/23/20 21:59 DC 4/22/20 22:27


58.333 MLS/HR


 


 Sodium Chloride


 100 meq/Potassium


 Phosphate 10 mmol/


 Magnesium Sulfate


 12 meq/Calcium


 Gluconate 15 meq/


 Multivitamins 10


 ml/Chromium/


 Copper/Manganese/


 Seleni/Zn 0.5 ml/


 Insulin Human


 Regular 35 unit/


 Potassium


 Chloride 20 meq/


 Total Parenteral


 Nutrition/Amino


 Acids/Dextrose/


 Fat Emulsion


 Intravenous  1,400 ml @ 


 58.333 mls/


 hr  TPN  CONT  4/16/20 22:00


 4/17/20 21:59 DC 4/16/20 22:10


58.333 MLS/HR


 


 Sodium Chloride


 100 meq/Potassium


 Phosphate 19 mmol/


 Magnesium Sulfate


 12 meq/Calcium


 Gluconate 15 meq/


 Multivitamins 10


 ml/Chromium/


 Copper/Manganese/


 Seleni/Zn 0.5 ml/


 Insulin Human


 Regular 40 unit/


 Potassium


 Chloride 20 meq/


 Total Parenteral


 Nutrition/Amino


 Acids/Dextrose/


 Fat Emulsion


 Intravenous  1,400 ml @ 


 58.333 mls/


 hr  TPN  CONT  4/15/20 22:00


 4/16/20 21:59 DC 4/15/20 21:20


58.333 MLS/HR


 


 Sodium Chloride


 100 meq/Potassium


 Phosphate 5 mmol/


 Magnesium Sulfate


 12 meq/Calcium


 Gluconate 15 meq/


 Multivitamins 10


 ml/Chromium/


 Copper/Manganese/


 Seleni/Zn 0.5 ml/


 Insulin Human


 Regular 35 unit/


 Potassium


 Chloride 20 meq/


 Total Parenteral


 Nutrition/Amino


 Acids/Dextrose/


 Fat Emulsion


 Intravenous  1,400 ml @ 


 58.333 mls/


 hr  TPN  CONT  4/17/20 22:00


 4/18/20 21:59 DC 4/17/20 22:59


58.333 MLS/HR


 


 Sodium Chloride


 110 meq/Potassium


 Chloride 30 meq/


 Potassium Acetate


 30 meq/Magnesium


 Sulfate 15 meq/


 Multivitamins 10


 ml/Chromium/


 Copper/Manganese/


 Seleni/Zn 1 ml/


 Insulin Human


 Regular 15 unit/


 Total Parenteral


 Nutrition/Amino


 Acids/Dextrose/


 Fat Emulsion


 Intravenous  1,680 ml @ 


 70 mls/hr  TPN  CONT  7/18/20 22:00


 7/19/20 21:59 DC 7/18/20 22:01


70 MLS/HR


 


 Sodium Chloride


 110 meq/Sodium


 Phosphate 10 mmol/


 Potassium


 Chloride 30 meq/


 Potassium Acetate


 30 meq/Magnesium


 Sulfate 15 meq/


 Multivitamins 10


 ml/Chromium/


 Copper/Manganese/


 Seleni/Zn 1 ml/


 Insulin Human


 Regular 15 unit/


 Total Parenteral


 Nutrition/Amino


 Acids/Dextrose/


 Fat Emulsion


 Intravenous  1,680 ml @ 


 70 mls/hr  TPN  CONT  7/19/20 22:00


 7/20/20 21:59 DC 7/19/20 22:03


70 MLS/HR


 


 Sodium Chloride


 120 meq/Sodium


 Phosphate 10 mmol/


 Potassium


 Chloride 30 meq/


 Potassium Acetate


 30 meq/Magnesium


 Sulfate 15 meq/


 Multivitamins 10


 ml/Chromium/


 Copper/Manganese/


 Seleni/Zn 1 ml/


 Insulin Human


 Regular 15 unit/


 Total Parenteral


 Nutrition/Amino


 Acids/Dextrose/


 Fat Emulsion


 Intravenous  1,680 ml @ 


 70 mls/hr  TPN  CONT  7/26/20 22:00


 7/27/20 21:59 Cancel  





 


 Succinylcholine


 Chloride


  (Anectine)  120 mg  1X  ONCE  3/23/20 08:30


 3/23/20 08:31 DC 3/23/20 08:34


120 MG


 


 Vancomycin HCl


  (Vanco Per


 Pharmacy)  1 each  PRN DAILY  PRN  7/12/20 09:15


 7/15/20 07:41 DC 7/14/20 02:46


1 EACH


 


 Vancomycin HCl


  (Vancomycin


 Random Level)  1 each  1X  ONCE  7/14/20 01:00


 7/14/20 01:01 DC 7/14/20 01:00


1 EACH


 


 Vancomycin HCl


  (Vancomycin


 Trough Level)  1 each  1X  ONCE  7/15/20 09:30


 7/15/20 09:31 Cancel  





 


 Vancomycin HCl


 1.5 gm/Sodium


 Chloride  500 ml @ 


 250 mls/hr  Q12H  7/14/20 10:00


 7/15/20 07:41 DC 7/14/20 22:07


250 MLS/HR


 


 Vancomycin HCl 2


 gm/Sodium Chloride  500 ml @ 


 250 mls/hr  1X  ONCE  7/12/20 10:00


 7/12/20 11:59 DC 7/12/20 10:34


250 MLS/HR


 


 Vasopressin


  (Vasostrict)  20 unit  STK-MED ONCE  6/30/20 12:23


 6/30/20 12:23 DC  





 


 Vasopressin 20


 unit/Dextrose  101 ml @ 


 12 mls/hr  CONT  PRN  6/30/20 15:30


 8/3/20 09:45 DC 7/7/20 04:17


12 MLS/HR


 


 Vecuronium Bromide


  (Norcuron Bolus)  6 mg  PRN Q6HRS  PRN  5/7/20 19:15


 5/7/20 19:35 DC  





 


 Vitamin A/Vitamin


 D


  (Vitamin A & D


 Ointment)  1 thomas  PRN Q1HR  PRN  8/4/20 09:15


 8/17/20 17:07 DC 8/13/20 08:36


1 THOMAS


 


 Zoledronic Acid  100 ml @ 


 400 mls/hr  1X  ONCE  8/7/20 12:00


 8/7/20 12:14 DC 8/7/20 13:04


400 MLS/HR








Lab





Laboratory Tests








Test


 8/17/20


12:12


 


White Blood Count


 33.0 x10^3/uL


(4.0-11.0)


 


Red Blood Count


 2.28 x10^6/uL


(3.50-5.40)


 


Hemoglobin


 6.4 g/dL


(12.0-15.5)


 


Hematocrit


 20.4 %


(36.0-47.0)


 


Mean Corpuscular Volume 89 fL () 


 


Mean Corpuscular Hemoglobin 28 pg (25-35) 


 


Mean Corpuscular Hemoglobin


Concent 31 g/dL


(31-37)


 


Red Cell Distribution Width


 17.5 %


(11.5-14.5)


 


Platelet Count


 239 x10^3/uL


(140-400)


 


Neutrophils (%) (Auto) 93 % (31-73) 


 


Lymphocytes (%) (Auto) 4 % (24-48) 


 


Monocytes (%) (Auto) 3 % (0-9) 


 


Eosinophils (%) (Auto) 0 % (0-3) 


 


Basophils (%) (Auto) 0 % (0-3) 


 


Neutrophils # (Auto)


 30.7 x10^3/uL


(1.8-7.7)


 


Lymphocytes # (Auto)


 1.2 x10^3/uL


(1.0-4.8)


 


Monocytes # (Auto)


 1.1 x10^3/uL


(0.0-1.1)


 


Eosinophils # (Auto)


 0.0 x10^3/uL


(0.0-0.7)


 


Basophils # (Auto)


 0.0 x10^3/uL


(0.0-0.2)


 


Sodium Level


 153 mmol/L


(136-145)


 


Potassium Level


 3.7 mmol/L


(3.5-5.1)


 


Chloride Level


 118 mmol/L


()


 


Carbon Dioxide Level


 15 mmol/L


(21-32)


 


Anion Gap 20 (6-14) 


 


Blood Urea Nitrogen


 56 mg/dL


(7-20)


 


Creatinine


 2.5 mg/dL


(0.6-1.0)


 


Estimated GFR


(Cockcroft-Gault) 20.5 





 


BUN/Creatinine Ratio 22 (6-20) 


 


Glucose Level


 76 mg/dL


(70-99)


 


Lactic Acid Level


 5.9 mmol/L


(0.4-2.0)


 


Calcium Level


 7.3 mg/dL


(8.5-10.1)


 


Phosphorus Level


 2.4 mg/dL


(2.6-4.7)


 


Magnesium Level


 1.8 mg/dL


(1.8-2.4)


 


Total Bilirubin


 1.3 mg/dL


(0.2-1.0)


 


Aspartate Amino Transf


(AST/SGOT) 34 U/L (15-37) 





 


Alanine Aminotransferase


(ALT/SGPT) 9 U/L (14-59) 





 


Alkaline Phosphatase


 282 U/L


()


 


Total Protein


 4.3 g/dL


(6.4-8.2)


 


Albumin


 1.9 g/dL


(3.4-5.0)


 


Albumin/Globulin Ratio 0.8 (1.0-1.7) 








Results


All relevant outside records, renal labs, imaging studies, telemetry/EKG's were 

reviewed.





Justicifation of Admission Dx:


Justifications for Admission:


Justification of Admission Dx:  Yes











KIMBERLY MYERS MD                Aug 18, 2020 09:53

## 2020-08-18 NOTE — PDOC
SAURAV NASH APRN 8/18/20 1127:


SURGICAL PROGRESS NOTE


DATE: 8/18/20 


TIME: 11:26


Subjective


noted now on comfort measures


will sign off


Vital Signs





Vital Signs








  Date Time  Temp Pulse Resp B/P (MAP) Pulse Ox O2 Delivery O2 Flow Rate FiO2


 


8/18/20 11:02   29  77 Nasal Cannula 1.0 


 


8/18/20 07:23  132  100/43 (62)    


 


8/18/20 04:07 98.4       





 98.4       








I&O











Intake and Output 


 


 8/18/20





 07:00


 


Intake Total 1625 ml


 


Output Total 545 ml


 


Balance 1080 ml


 


 


 


Intake Oral 0 ml


 


IV Total 100 ml


 


Tube Feeding 200 ml


 


Blood Product 550 ml


 


Blood Product IV Normal Saline Flush 775 ml


 


Output Urine Total 245 ml


 


Drainage Total 300 ml








Labs





Laboratory Tests








Test


 8/16/20


11:47 8/16/20


18:09 8/16/20


23:05 8/16/20


23:30


 


Glucose (Fingerstick)


 195 mg/dL


(70-99) 171 mg/dL


(70-99) 


 





 


O2 Saturation   97 % (92-99)  


 


Arterial Blood pH


 


 


 7.22


(7.35-7.45) 





 


Arterial Blood pH (Temp


corrected) 


 


 7.21 


 





 


Arterial Blood pCO2 at


Patient Temp 


 


 18 mmHg


(35-46) 





 


Arterial Blood pCO2 (Temp


correct) 


 


 19 mmHg 


 





 


Arterial Blood pO2 at Patient


Temp 


 


 107 mmHg


() 





 


Arterial Blood pO2 (Temp


corrected) 


 


 112 mmHg 


 





 


Arterial Blood HCO3


 


 


 7 mmol/L


(21-28) 





 


Arterial Blood Base Excess


 


 


 -19 mmol/L


(-3-3) 





 


FiO2   40  


 


Hemoglobin


 


 


 


 6.3 g/dL


(12.0-15.5)


 


Hematocrit


 


 


 


 21.3 %


(36.0-47.0)


 


Mean Corpuscular Hemoglobin


Concent 


 


 


 30 g/dL


(31-37)


 


Prothrombin Time


 


 


 


 34.6 SEC


(11.7-14.0)


 


Prothromb Time International


Ratio 


 


 


 3.4 (0.8-1.1) 





 


Test


 8/17/20


00:13 8/17/20


04:01 8/17/20


06:10 8/17/20


06:20


 


Glucose (Fingerstick)


 109 mg/dL


(70-99) 107 mg/dL


(70-99) 93 mg/dL


(70-99) 





 


White Blood Count


 


 


 


 39.1 x10^3/uL


(4.0-11.0)


 


Red Blood Count


 


 


 


 2.87 x10^6/uL


(3.50-5.40)


 


Hemoglobin


 


 


 


 8.0 g/dL


(12.0-15.5)


 


Hematocrit


 


 


 


 26.3 %


(36.0-47.0)


 


Mean Corpuscular Volume    92 fL () 


 


Mean Corpuscular Hemoglobin    28 pg (25-35) 


 


Mean Corpuscular Hemoglobin


Concent 


 


 


 30 g/dL


(31-37)


 


Red Cell Distribution Width


 


 


 


 17.9 %


(11.5-14.5)


 


Platelet Count


 


 


 


 280 x10^3/uL


(140-400)


 


Neutrophils (%) (Auto)    94 % (31-73) 


 


Lymphocytes (%) (Auto)    3 % (24-48) 


 


Monocytes (%) (Auto)    3 % (0-9) 


 


Eosinophils (%) (Auto)    0 % (0-3) 


 


Basophils (%) (Auto)    0 % (0-3) 


 


Neutrophils # (Auto)


 


 


 


 36.9 x10^3/uL


(1.8-7.7)


 


Lymphocytes # (Auto)


 


 


 


 1.0 x10^3/uL


(1.0-4.8)


 


Monocytes # (Auto)


 


 


 


 1.1 x10^3/uL


(0.0-1.1)


 


Eosinophils # (Auto)


 


 


 


 0.0 x10^3/uL


(0.0-0.7)


 


Basophils # (Auto)


 


 


 


 0.0 x10^3/uL


(0.0-0.2)


 


Segmented Neutrophils %    38 % (35-66) 


 


Band Neutrophils %    52 % (0-9) 


 


Lymphocytes %    6 % (24-48) 


 


Monocytes %    4 % (0-10) 


 


Nucleated Red Blood Cells    2 


 


Dohle Bodies    Few 


 


Platelet Estimate


 


 


 


 Adequate


(ADEQUATE)


 


Prothrombin Time


 


 


 


 27.9 SEC


(11.7-14.0)


 


Prothromb Time International


Ratio 


 


 


 2.6 (0.8-1.1) 





 


Sodium Level


 


 


 


 154 mmol/L


(136-145)


 


Potassium Level


 


 


 


 3.6 mmol/L


(3.5-5.1)


 


Chloride Level


 


 


 


 119 mmol/L


()


 


Carbon Dioxide Level


 


 


 


 14 mmol/L


(21-32)


 


Anion Gap    21 (6-14) 


 


Blood Urea Nitrogen


 


 


 


 58 mg/dL


(7-20)


 


Creatinine


 


 


 


 2.5 mg/dL


(0.6-1.0)


 


Estimated GFR


(Cockcroft-Gault) 


 


 


 20.5 





 


BUN/Creatinine Ratio    23 (6-20) 


 


Glucose Level


 


 


 


 100 mg/dL


(70-99)


 


Calcium Level


 


 


 


 7.8 mg/dL


(8.5-10.1)


 


Total Bilirubin


 


 


 


 0.7 mg/dL


(0.2-1.0)


 


Aspartate Amino Transf


(AST/SGOT) 


 


 


 30 U/L (15-37) 





 


Alanine Aminotransferase


(ALT/SGPT) 


 


 


 13 U/L (14-59) 





 


Alkaline Phosphatase


 


 


 


 338 U/L


()


 


Total Protein


 


 


 


 4.8 g/dL


(6.4-8.2)


 


Albumin


 


 


 


 2.0 g/dL


(3.4-5.0)


 


Albumin/Globulin Ratio    0.7 (1.0-1.7) 


 


Test


 8/17/20


12:12 


 


 





 


White Blood Count


 33.0 x10^3/uL


(4.0-11.0) 


 


 





 


Red Blood Count


 2.28 x10^6/uL


(3.50-5.40) 


 


 





 


Hemoglobin


 6.4 g/dL


(12.0-15.5) 


 


 





 


Hematocrit


 20.4 %


(36.0-47.0) 


 


 





 


Mean Corpuscular Volume 89 fL ()    


 


Mean Corpuscular Hemoglobin 28 pg (25-35)    


 


Mean Corpuscular Hemoglobin


Concent 31 g/dL


(31-37) 


 


 





 


Red Cell Distribution Width


 17.5 %


(11.5-14.5) 


 


 





 


Platelet Count


 239 x10^3/uL


(140-400) 


 


 





 


Neutrophils (%) (Auto) 93 % (31-73)    


 


Lymphocytes (%) (Auto) 4 % (24-48)    


 


Monocytes (%) (Auto) 3 % (0-9)    


 


Eosinophils (%) (Auto) 0 % (0-3)    


 


Basophils (%) (Auto) 0 % (0-3)    


 


Neutrophils # (Auto)


 30.7 x10^3/uL


(1.8-7.7) 


 


 





 


Lymphocytes # (Auto)


 1.2 x10^3/uL


(1.0-4.8) 


 


 





 


Monocytes # (Auto)


 1.1 x10^3/uL


(0.0-1.1) 


 


 





 


Eosinophils # (Auto)


 0.0 x10^3/uL


(0.0-0.7) 


 


 





 


Basophils # (Auto)


 0.0 x10^3/uL


(0.0-0.2) 


 


 





 


Sodium Level


 153 mmol/L


(136-145) 


 


 





 


Potassium Level


 3.7 mmol/L


(3.5-5.1) 


 


 





 


Chloride Level


 118 mmol/L


() 


 


 





 


Carbon Dioxide Level


 15 mmol/L


(21-32) 


 


 





 


Anion Gap 20 (6-14)    


 


Blood Urea Nitrogen


 56 mg/dL


(7-20) 


 


 





 


Creatinine


 2.5 mg/dL


(0.6-1.0) 


 


 





 


Estimated GFR


(Cockcroft-Gault) 20.5 


 


 


 





 


BUN/Creatinine Ratio 22 (6-20)    


 


Glucose Level


 76 mg/dL


(70-99) 


 


 





 


Lactic Acid Level


 5.9 mmol/L


(0.4-2.0) 


 


 





 


Calcium Level


 7.3 mg/dL


(8.5-10.1) 


 


 





 


Phosphorus Level


 2.4 mg/dL


(2.6-4.7) 


 


 





 


Magnesium Level


 1.8 mg/dL


(1.8-2.4) 


 


 





 


Total Bilirubin


 1.3 mg/dL


(0.2-1.0) 


 


 





 


Aspartate Amino Transf


(AST/SGOT) 34 U/L (15-37) 


 


 


 





 


Alanine Aminotransferase


(ALT/SGPT) 9 U/L (14-59) 


 


 


 





 


Alkaline Phosphatase


 282 U/L


() 


 


 





 


Total Protein


 4.3 g/dL


(6.4-8.2) 


 


 





 


Albumin


 1.9 g/dL


(3.4-5.0) 


 


 





 


Albumin/Globulin Ratio 0.8 (1.0-1.7)    








Laboratory Tests








Test


 8/17/20


12:12


 


White Blood Count


 33.0 x10^3/uL


(4.0-11.0)


 


Red Blood Count


 2.28 x10^6/uL


(3.50-5.40)


 


Hemoglobin


 6.4 g/dL


(12.0-15.5)


 


Hematocrit


 20.4 %


(36.0-47.0)


 


Mean Corpuscular Volume 89 fL () 


 


Mean Corpuscular Hemoglobin 28 pg (25-35) 


 


Mean Corpuscular Hemoglobin


Concent 31 g/dL


(31-37)


 


Red Cell Distribution Width


 17.5 %


(11.5-14.5)


 


Platelet Count


 239 x10^3/uL


(140-400)


 


Neutrophils (%) (Auto) 93 % (31-73) 


 


Lymphocytes (%) (Auto) 4 % (24-48) 


 


Monocytes (%) (Auto) 3 % (0-9) 


 


Eosinophils (%) (Auto) 0 % (0-3) 


 


Basophils (%) (Auto) 0 % (0-3) 


 


Neutrophils # (Auto)


 30.7 x10^3/uL


(1.8-7.7)


 


Lymphocytes # (Auto)


 1.2 x10^3/uL


(1.0-4.8)


 


Monocytes # (Auto)


 1.1 x10^3/uL


(0.0-1.1)


 


Eosinophils # (Auto)


 0.0 x10^3/uL


(0.0-0.7)


 


Basophils # (Auto)


 0.0 x10^3/uL


(0.0-0.2)


 


Sodium Level


 153 mmol/L


(136-145)


 


Potassium Level


 3.7 mmol/L


(3.5-5.1)


 


Chloride Level


 118 mmol/L


()


 


Carbon Dioxide Level


 15 mmol/L


(21-32)


 


Anion Gap 20 (6-14) 


 


Blood Urea Nitrogen


 56 mg/dL


(7-20)


 


Creatinine


 2.5 mg/dL


(0.6-1.0)


 


Estimated GFR


(Cockcroft-Gault) 20.5 





 


BUN/Creatinine Ratio 22 (6-20) 


 


Glucose Level


 76 mg/dL


(70-99)


 


Lactic Acid Level


 5.9 mmol/L


(0.4-2.0)


 


Calcium Level


 7.3 mg/dL


(8.5-10.1)


 


Phosphorus Level


 2.4 mg/dL


(2.6-4.7)


 


Magnesium Level


 1.8 mg/dL


(1.8-2.4)


 


Total Bilirubin


 1.3 mg/dL


(0.2-1.0)


 


Aspartate Amino Transf


(AST/SGOT) 34 U/L (15-37) 





 


Alanine Aminotransferase


(ALT/SGPT) 9 U/L (14-59) 





 


Alkaline Phosphatase


 282 U/L


()


 


Total Protein


 4.3 g/dL


(6.4-8.2)


 


Albumin


 1.9 g/dL


(3.4-5.0)


 


Albumin/Globulin Ratio 0.8 (1.0-1.7) 








Problem List


Problems


Medical Problems:


(1) Acute pancreatitis


Status: Acute  





(2) Cholelithiasis


Status: Acute  











Justicifation of Admission Dx:


Justifications for Admission:


Justification of Admission Dx:  Yes





OVIDIO MEDINA MD 8/18/20 1230:


SURGICAL PROGRESS NOTE


Assessment/Plan


Agree with Leeroy assessment and plan











SAURAV NASH            Aug 18, 2020 11:27


OVIDIO MEDINA MD             Aug 18, 2020 12:30

## 2020-08-18 NOTE — PDOC
Infectious Disease Note


Subjective


Subjective


Unresponsive





Vital Sign


Vital Signs





Vital Signs








  Date Time  Temp Pulse Resp B/P (MAP) Pulse Ox O2 Delivery O2 Flow Rate FiO2


 


8/18/20 07:23  132 27 100/43 (62) 77 Nasal Cannula 2.0 


 


8/18/20 04:07 98.4       





 98.4       











Physical Exam


PHYSICAL EXAM


GENERAL: Propped up in bed, her eyes are wide open, upward gaze, unresponsive to

verbal, + visual threat 


HEENT: Pupils equal, oral cavity dry. 


NECK:  Tracheostomy 


LUNGS: Diminished aeration bases,


HEART:  S1, S2, 


ABDOMEN: Distended, bowel sounds hypoactive, soft, GJ drain present, drainage 

bag in place - bloody drainage, + rectal tube 


: [place] in place 


EXTREMITIES: Generalized edema,


SKIN: warm touch. 


NEURO: - Eyes are open, unresponsive to verbal and tactile stimuli 


RUE  PICC site without signs of complications. PIV s clean





Labs


Lab





Laboratory Tests








Test


 8/17/20


12:12


 


White Blood Count


 33.0 x10^3/uL


(4.0-11.0)


 


Red Blood Count


 2.28 x10^6/uL


(3.50-5.40)


 


Hemoglobin


 6.4 g/dL


(12.0-15.5)


 


Hematocrit


 20.4 %


(36.0-47.0)


 


Mean Corpuscular Volume 89 fL () 


 


Mean Corpuscular Hemoglobin 28 pg (25-35) 


 


Mean Corpuscular Hemoglobin


Concent 31 g/dL


(31-37)


 


Red Cell Distribution Width


 17.5 %


(11.5-14.5)


 


Platelet Count


 239 x10^3/uL


(140-400)


 


Neutrophils (%) (Auto) 93 % (31-73) 


 


Lymphocytes (%) (Auto) 4 % (24-48) 


 


Monocytes (%) (Auto) 3 % (0-9) 


 


Eosinophils (%) (Auto) 0 % (0-3) 


 


Basophils (%) (Auto) 0 % (0-3) 


 


Neutrophils # (Auto)


 30.7 x10^3/uL


(1.8-7.7)


 


Lymphocytes # (Auto)


 1.2 x10^3/uL


(1.0-4.8)


 


Monocytes # (Auto)


 1.1 x10^3/uL


(0.0-1.1)


 


Eosinophils # (Auto)


 0.0 x10^3/uL


(0.0-0.7)


 


Basophils # (Auto)


 0.0 x10^3/uL


(0.0-0.2)


 


Sodium Level


 153 mmol/L


(136-145)


 


Potassium Level


 3.7 mmol/L


(3.5-5.1)


 


Chloride Level


 118 mmol/L


()


 


Carbon Dioxide Level


 15 mmol/L


(21-32)


 


Anion Gap 20 (6-14) 


 


Blood Urea Nitrogen


 56 mg/dL


(7-20)


 


Creatinine


 2.5 mg/dL


(0.6-1.0)


 


Estimated GFR


(Cockcroft-Gault) 20.5 





 


BUN/Creatinine Ratio 22 (6-20) 


 


Glucose Level


 76 mg/dL


(70-99)


 


Lactic Acid Level


 5.9 mmol/L


(0.4-2.0)


 


Calcium Level


 7.3 mg/dL


(8.5-10.1)


 


Phosphorus Level


 2.4 mg/dL


(2.6-4.7)


 


Magnesium Level


 1.8 mg/dL


(1.8-2.4)


 


Total Bilirubin


 1.3 mg/dL


(0.2-1.0)


 


Aspartate Amino Transf


(AST/SGOT) 34 U/L (15-37) 





 


Alanine Aminotransferase


(ALT/SGPT) 9 U/L (14-59) 





 


Alkaline Phosphatase


 282 U/L


()


 


Total Protein


 4.3 g/dL


(6.4-8.2)


 


Albumin


 1.9 g/dL


(3.4-5.0)


 


Albumin/Globulin Ratio 0.8 (1.0-1.7) 








Micro


CT 8/17





IMPRESSION:  


Redemonstration of extensive bilateral retroperitoneal and peritoneal 


collections, slightly increased in the left subdiaphragmatic region but 


otherwise similar in configuration to prior exams. Scattered areas of high


attenuation material within these collections consistent with acute blood 


products.








6/7 





GRAM STAIN  Final  


        Final





        GRAM NEGATIVE RODS:MODERATE


        SQUAMOUS EPI CELL:NOT APPLICABLE


        PMN (WBCs):RARE


        YEAST:MODERATE


        Unless otherwise specified, Testing Performed by:


        [address] [address]


        For Inquires, the Physician may contact the Microbiology


        department at [phone]





  ANAEROBIC-AEROBIC CULTURE  Preliminary  


        Preliminary





        MANY GRAM NEGATIVE RODS on 06/09/20 at 1158


        FINAL ID= [PSEUDOMONAS AERUGINOSA]


        PSEUDOMONAS AERUGINOSA





  ANTIMICROBIAL SUSCEPTIBILITY  Preliminary  


        Comment





        NEG ANSON 56


        PSEUDOMONAS AERUGINOSA


        ANTIBIOTIC                        RESULT          INTERPRETATION


        AMIKACIN                          <=16                  S


        AZTREONAM                         >16                   R


        CEFTAZIDIME                       >16                   R


        CIPROFLOXACIN                     <=0.25                S


        CEFEPIME                          16                    I


        CEFTAZIDIME/AVIBACTAM             <=4                   S


        GENTAMICIN                        <=2                   S














                               ** CONTINUED ON NEXT PAGE **





 

--------------------------------------------------------------------------------


------------





RUN DATE: 06/11/20                  [email or web address] *LIVE*               

  PAGE 2   


RUN TIME: 1016                            Specimen Inquiry                    


 

--------------------------------------------------------------------------------


------------





SPEC: [id number]    PATIENT: GENEVAJESENIA                [id number]  

(Continued)


----------------------------------------

----------------------------------------------------








-------------------------------------------------------------------------

-------------------





  Procedure                         Result                                      

         


----------------

----------------------------------------------------------------------------





  ANTIMICROBIAL SUSCEPTIBILITY  Preliminary   (continued)


        LEVOFLOXACIN                      <=0.5                 S


        MEROPENEM                         <=1                   S


        PIPERACILLIN/TAZOBACTAM           64                    S


        TOBRAMYCIN                        <=2                   S


        Unless otherwise specified, Testing Performed by:


        [facility]


        [address] [place] [address]


        For Inquires, the Physician may contact the Microbiology


        department at [phone]











CT Scan 6/6





IMPRESSION:


1. Removal of the percutaneous pigtail drainage catheters since the prior 


exam. Sequela of pancreatitis with extensive pseudocysts again 


demonstrated, the right-sided collections are slightly larger since the 


prior exam, the left-sided collections are stable. See above.


2. Moderate to large left pleural effusion with atelectasis and collapse 


of most of the left lower lobe, stable. Small right pleural effusion is 


stable.


3. Gallstone.





Objective


Assessment





Patient with prolonged hospitalization more than 4 months


Multiple medical problems


Multiple surgical procedures





GI bleed - s/p PRBCs/FFP/vit K


Fever


URINE with parapsilosis 8/10


S/P Exp. 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 

parapsilosis & PSAE (I-merrem/ceftazidime/AZT/cefepime))


Leukocytosis - worse


JED


Anemia


Coagulopathy


Loose stool but on tube feed - WBC down and no gross fever


Fever - 7/25 c-diff neg


Acute gallstone pancreatitis with persistent necrosis


  - 4/9.  CT A/P Increased ascites. Persistent evidence of necrotizing 

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


  - 4/27. status post ROBERT drain placement; C. parapsilosis. s/p drain 5/6 + yeast

& high amylase; s/p additional drain on 5/8. Drains removed. 


  -5/6. fluid  candida parapsilosis fluid, amylase high


  - 6/6 showed multiple pseudocysts, slight larger on the right. s/p drains x 3,

6/7.  + PSAE (MDRO-R Cefepime, Zosyn ANSON < 64) and yeast, 


  -6/7 s/p drain replacement x 3; fluid cult PSAE (MDRO), yeast; treated


  -7/12 CT A/P shows smaller fluid collections.  


  -722 CT abdomen and pelvis drains in place       


Ascites s/p paracentesis 4/15 & 5/6. C. parapsilosis 


Cholelithiasis with thickening of the gallbladder wall.


JED, Hyperkalemia, Metabolic acidosis off dialysis


Acute hypoxic resp failure. trach/vent. sputum 6/13  + PSAE (I merrem) ; sputum 

culture July 19+ for PSAE R Merrem, sensitive to cefepime


Pleural effusion status post CTS left side


 Abdominal fluid culture 6 /7 MDRO Pseudomonas, yeast


Sputum culture positive 7/19 for MDRO Pseudomonas


Chest tube fluid positive for 7/21 Candida Parapsilosis





Plan


Plan of Care





D/w nursing and now with comfort measures





Please call with questions








D/w nursing











RASHAWN ROSEN MD              Aug 18, 2020 08:21

## 2020-08-18 NOTE — PDOC
G I PROGRESS NOTE


Subjective


Non-responsive.


Objective


Noted febrile, acidotic.  Family wishes to withdraw aggressive care.


Physical Exam


NO PE.


Review of Relevant


I have reviewed the following items josy (where applicable) has been applied.


Labs





Laboratory Tests








Test


 8/16/20


18:09 8/16/20


23:05 8/16/20


23:30 8/17/20


00:13


 


Glucose (Fingerstick)


 171 mg/dL


(70-99) 


 


 109 mg/dL


(70-99)


 


O2 Saturation  97 % (92-99)   


 


Arterial Blood pH


 


 7.22


(7.35-7.45) 


 





 


Arterial Blood pH (Temp


corrected) 


 7.21 


 


 





 


Arterial Blood pCO2 at


Patient Temp 


 18 mmHg


(35-46) 


 





 


Arterial Blood pCO2 (Temp


correct) 


 19 mmHg 


 


 





 


Arterial Blood pO2 at Patient


Temp 


 107 mmHg


() 


 





 


Arterial Blood pO2 (Temp


corrected) 


 112 mmHg 


 


 





 


Arterial Blood HCO3


 


 7 mmol/L


(21-28) 


 





 


Arterial Blood Base Excess


 


 -19 mmol/L


(-3-3) 


 





 


FiO2  40   


 


Hemoglobin


 


 


 6.3 g/dL


(12.0-15.5) 





 


Hematocrit


 


 


 21.3 %


(36.0-47.0) 





 


Mean Corpuscular Hemoglobin


Concent 


 


 30 g/dL


(31-37) 





 


Prothrombin Time


 


 


 34.6 SEC


(11.7-14.0) 





 


Prothromb Time International


Ratio 


 


 3.4 (0.8-1.1) 


 





 


Test


 8/17/20


04:01 8/17/20


06:10 8/17/20


06:20 8/17/20


12:12


 


Glucose (Fingerstick)


 107 mg/dL


(70-99) 93 mg/dL


(70-99) 


 





 


White Blood Count


 


 


 39.1 x10^3/uL


(4.0-11.0) 33.0 x10^3/uL


(4.0-11.0)


 


Red Blood Count


 


 


 2.87 x10^6/uL


(3.50-5.40) 2.28 x10^6/uL


(3.50-5.40)


 


Hemoglobin


 


 


 8.0 g/dL


(12.0-15.5) 6.4 g/dL


(12.0-15.5)


 


Hematocrit


 


 


 26.3 %


(36.0-47.0) 20.4 %


(36.0-47.0)


 


Mean Corpuscular Volume   92 fL ()  89 fL () 


 


Mean Corpuscular Hemoglobin   28 pg (25-35)  28 pg (25-35) 


 


Mean Corpuscular Hemoglobin


Concent 


 


 30 g/dL


(31-37) 31 g/dL


(31-37)


 


Red Cell Distribution Width


 


 


 17.9 %


(11.5-14.5) 17.5 %


(11.5-14.5)


 


Platelet Count


 


 


 280 x10^3/uL


(140-400) 239 x10^3/uL


(140-400)


 


Neutrophils (%) (Auto)   94 % (31-73)  93 % (31-73) 


 


Lymphocytes (%) (Auto)   3 % (24-48)  4 % (24-48) 


 


Monocytes (%) (Auto)   3 % (0-9)  3 % (0-9) 


 


Eosinophils (%) (Auto)   0 % (0-3)  0 % (0-3) 


 


Basophils (%) (Auto)   0 % (0-3)  0 % (0-3) 


 


Neutrophils # (Auto)


 


 


 36.9 x10^3/uL


(1.8-7.7) 30.7 x10^3/uL


(1.8-7.7)


 


Lymphocytes # (Auto)


 


 


 1.0 x10^3/uL


(1.0-4.8) 1.2 x10^3/uL


(1.0-4.8)


 


Monocytes # (Auto)


 


 


 1.1 x10^3/uL


(0.0-1.1) 1.1 x10^3/uL


(0.0-1.1)


 


Eosinophils # (Auto)


 


 


 0.0 x10^3/uL


(0.0-0.7) 0.0 x10^3/uL


(0.0-0.7)


 


Basophils # (Auto)


 


 


 0.0 x10^3/uL


(0.0-0.2) 0.0 x10^3/uL


(0.0-0.2)


 


Segmented Neutrophils %   38 % (35-66)  


 


Band Neutrophils %   52 % (0-9)  


 


Lymphocytes %   6 % (24-48)  


 


Monocytes %   4 % (0-10)  


 


Nucleated Red Blood Cells   2  


 


Dohle Bodies   Few  


 


Platelet Estimate


 


 


 Adequate


(ADEQUATE) 





 


Prothrombin Time


 


 


 27.9 SEC


(11.7-14.0) 





 


Prothromb Time International


Ratio 


 


 2.6 (0.8-1.1) 


 





 


Sodium Level


 


 


 154 mmol/L


(136-145) 153 mmol/L


(136-145)


 


Potassium Level


 


 


 3.6 mmol/L


(3.5-5.1) 3.7 mmol/L


(3.5-5.1)


 


Chloride Level


 


 


 119 mmol/L


() 118 mmol/L


()


 


Carbon Dioxide Level


 


 


 14 mmol/L


(21-32) 15 mmol/L


(21-32)


 


Anion Gap   21 (6-14)  20 (6-14) 


 


Blood Urea Nitrogen


 


 


 58 mg/dL


(7-20) 56 mg/dL


(7-20)


 


Creatinine


 


 


 2.5 mg/dL


(0.6-1.0) 2.5 mg/dL


(0.6-1.0)


 


Estimated GFR


(Cockcroft-Gault) 


 


 20.5 


 20.5 





 


BUN/Creatinine Ratio   23 (6-20)  22 (6-20) 


 


Glucose Level


 


 


 100 mg/dL


(70-99) 76 mg/dL


(70-99)


 


Calcium Level


 


 


 7.8 mg/dL


(8.5-10.1) 7.3 mg/dL


(8.5-10.1)


 


Total Bilirubin


 


 


 0.7 mg/dL


(0.2-1.0) 1.3 mg/dL


(0.2-1.0)


 


Aspartate Amino Transf


(AST/SGOT) 


 


 30 U/L (15-37) 


 34 U/L (15-37) 





 


Alanine Aminotransferase


(ALT/SGPT) 


 


 13 U/L (14-59) 


 9 U/L (14-59) 





 


Alkaline Phosphatase


 


 


 338 U/L


() 282 U/L


()


 


Total Protein


 


 


 4.8 g/dL


(6.4-8.2) 4.3 g/dL


(6.4-8.2)


 


Albumin


 


 


 2.0 g/dL


(3.4-5.0) 1.9 g/dL


(3.4-5.0)


 


Albumin/Globulin Ratio   0.7 (1.0-1.7)  0.8 (1.0-1.7) 


 


Lactic Acid Level


 


 


 


 5.9 mmol/L


(0.4-2.0)


 


Phosphorus Level


 


 


 


 2.4 mg/dL


(2.6-4.7)


 


Magnesium Level


 


 


 


 1.8 mg/dL


(1.8-2.4)








Microbiology


8/16/20 Blood Culture - Preliminary, Resulted


          


8/10/20 Urine Culture - Final, Complete


          


7/26/20 Gram Stain - Final, Complete


          


7/26/20 Aerobic Culture - Final, Complete


          


7/21/20 Gram Stain - Final, Complete


          


7/21/20 Aerobic and Anaerobic Culture - Final, Complete


          


7/19/20 Gram Stain Evaluation - Final, Complete


          


7/19/20 Respiratory Culture - Final, Complete


          


7/19/20 Antimicrobic Susceptibility - Final, Complete


          


6/30/20 Gram Stain - Final, Complete


          


6/30/20 Aerobic and Anaerobic Culture - Final, Complete


          


6/30/20 Antimicrobic Susceptibility - Final, Complete


          


GNR in blood.


Medications





Current Medications


Sodium Chloride 1,000 ml @  1,000 mls/hr Q1H IV  Last administered on 3/16/20at 

03:00;  Start 3/16/20 at 03:00;  Stop 3/16/20 at 03:59;  Status DC


Ondansetron HCl (Zofran) 4 mg 1X  ONCE IVP  Last administered on 3/16/20at 

03:27;  Start 3/16/20 at 03:00;  Stop 3/16/20 at 03:01;  Status DC


Morphine Sulfate (Morphine Sulfate) 4 mg 1X  ONCE IV ;  Start 3/16/20 at 03:00; 

Stop 3/16/20 at 03:01;  Status Cancel


Ketorolac Tromethamine (Toradol 30mg Vial) 30 mg 1X  ONCE IV  Last administered 

on 3/16/20at 02:54;  Start 3/16/20 at 03:00;  Stop 3/16/20 at 03:01;  Status DC


Fentanyl Citrate (Fentanyl 2ml Vial) 25 mcg 1X  ONCE IVP  Last administered on 

3/16/20at 03:23;  Start 3/16/20 at 03:30;  Stop 3/16/20 at 03:31;  Status DC


Fentanyl Citrate (Fentanyl 2ml Vial) 100 mcg STK-MED ONCE .ROUTE ;  Start 

3/16/20 at 03:18;  Stop 3/16/20 at 03:18;  Status DC


Iohexol (Omnipaque 350 Mg/ml) 90 ml 1X  ONCE IV  Last administered on 3/16/20at 

03:25;  Start 3/16/20 at 03:30;  Stop 3/16/20 at 03:31;  Status DC


Info (CONTRAST GIVEN -- Rx MONITORING) 1 each PRN DAILY  PRN MC SEE COMMENTS;  

Start 3/16/20 at 03:30;  Stop 3/18/20 at 03:29;  Status DC


Hydromorphone HCl (Dilaudid) 0.5 mg 1X  ONCE IV  Last administered on 3/16/20at 

03:55;  Start 3/16/20 at 04:30;  Stop 3/16/20 at 04:32;  Status DC


Ondansetron HCl (Zofran) 4 mg PRN Q8HRS  PRN IV NAUSEA/VOMITING 1ST CHOICE;  

Start 3/16/20 at 05:00;  Stop 3/16/20 at 09:27;  Status DC


Morphine Sulfate (Morphine Sulfate) 2 mg PRN Q2HR  PRN IV SEVERE PAIN 7-10 Last 

administered on 3/17/20at 12:26;  Start 3/16/20 at 05:00;  Stop 3/17/20 at 

14:15;  Status DC


Sodium Chloride 1,000 ml @  125 mls/hr Q8H IV  Last administered on 3/16/20at 

20:56;  Start 3/16/20 at 05:00;  Stop 3/17/20 at 04:59;  Status DC


Hydromorphone HCl (Dilaudid) 0.5 mg PRN Q3HRS  PRN IV SEVERE PAIN 7-10 Last 

administered on 3/17/20at 10:06;  Start 3/16/20 at 05:00;  Stop 3/17/20 at 

12:01;  Status DC


Piperacillin Sod/ Tazobactam Sod 4.5 gm/Sodium Chloride 100 ml @  200 mls/hr 1X 

ONCE IV  Last administered on 3/16/20at 05:44;  Start 3/16/20 at 06:00;  Stop 

3/16/20 at 06:29;  Status DC


Ondansetron HCl (Zofran) 4 mg PRN Q4HRS  PRN IV NAUSEA/VOMITING 1ST CHOICE Last 

administered on 8/15/20at 14:27;  Start 3/16/20 at 09:30


Insulin Human Lispro (HumaLOG) 0-9 UNITS Q6HRS SQ  Last administered on 

8/14/20at 12:43;  Start 3/16/20 at 09:30;  Stop 8/17/20 at 17:07;  Status DC


Dextrose (Dextrose 50%-Water Syringe) 12.5 gm PRN Q15MIN  PRN IV SEE COMMENTS;  

Start 3/16/20 at 09:30;  Stop 8/17/20 at 17:07;  Status DC


Pantoprazole Sodium (PROTONIX VIAL for IV PUSH) 40 mg DAILYAC IVP  Last 

administered on 8/17/20at 08:57;  Start 3/16/20 at 11:30;  Stop 8/17/20 at 

17:07;  Status DC


Prochlorperazine Edisylate (Compazine) 10 mg PRN Q6HRS  PRN IV NAUSEA/VOMITING, 

2nd CHOICE Last administered on 8/10/20at 00:42;  Start 3/16/20 at 17:45


Atenolol (Tenormin) 100 mg DAILY PO ;  Start 3/17/20 at 09:00;  Stop 3/16/20 at 

20:08;  Status DC


Metoprolol Tartrate (Lopressor Vial) 2.5 mg Q6HRS IVP  Last administered on 3/1

7/20at 05:51;  Start 3/16/20 at 20:15;  Stop 3/17/20 at 10:02;  Status DC


Metoprolol Tartrate (Lopressor Vial) 5 mg Q6HRS IVP  Last administered on 

3/26/20at 00:12;  Start 3/17/20 at 10:15;  Stop 3/28/20 at 08:48;  Status DC


Hydromorphone HCl (Dilaudid) 1 mg PRN Q3HRS  PRN IV SEVERE PAIN 7-10 Last 

administered on 3/23/20at 05:13;  Start 3/17/20 at 12:00;  Stop 3/31/20 at 

00:25;  Status DC


Lidocaine HCl (Buffered Lidocaine 1%) 3 ml STK-MED ONCE .ROUTE ;  Start 3/17/20 

at 12:55;  Stop 3/17/20 at 12:56;  Status DC


Albumin Human 500 ml @  125 mls/hr 1X  ONCE IV  Last administered on 3/17/20at 

14:33;  Start 3/17/20 at 14:30;  Stop 3/17/20 at 18:32;  Status DC


Norepinephrine Bitartrate 8 mg/ Dextrose 258 ml @  17.299 mls/ hr CONT  PRN IV 

PER PROTOCOL Last administered on 4/14/20at 12:48;  Start 3/17/20 at 15:30;  

Stop 4/17/20 at 09:19;  Status DC


Sodium Chloride 1,000 ml @  125 mls/hr Q8H IV  Last administered on 3/17/20at 

21:04;  Start 3/17/20 at 16:00;  Stop 3/18/20 at 02:42;  Status DC


Albumin Human 500 ml @  125 mls/hr PRN BID  PRN IV After every 2L NSS & BP < 

90mm Last administered on 6/30/20at 16:06;  Start 3/17/20 at 16:00;  Stop 7/3/20

at 09:30;  Status DC


Iohexol (Omnipaque 300 Mg/ml) 60 ml 1X  ONCE IV  Last administered on 3/17/20at 

17:20;  Start 3/17/20 at 17:00;  Stop 3/17/20 at 17:01;  Status DC


Info (CONTRAST GIVEN -- Rx MONITORING) 1 each PRN DAILY  PRN MC SEE COMMENTS;  

Start 3/17/20 at 17:00;  Stop 3/19/20 at 16:59;  Status DC


Meropenem 1 gm/ Sodium Chloride 100 ml @  200 mls/hr Q8HRS IV  Last administered

on 3/18/20at 05:45;  Start 3/17/20 at 20:00;  Stop 3/18/20 at 08:48;  Status DC


Furosemide (Lasix) 40 mg 1X  ONCE IVP  Last administered on 3/17/20at 22:12;  

Start 3/17/20 at 22:30;  Stop 3/17/20 at 22:31;  Status DC


Calcium Chloride 1000 mg/Sodium Chloride 110 ml @  220 mls/hr 1X  ONCE IV  Last 

administered on 3/17/20at 22:11;  Start 3/17/20 at 22:30;  Stop 3/17/20 at 22:59

;  Status DC


Albuterol Sulfate (Ventolin Neb Soln) 2.5 mg 1X  ONCE NEB  Last administered on 

3/18/20at 00:56;  Start 3/17/20 at 22:30;  Stop 3/17/20 at 22:31;  Status DC


Insulin Human Regular (HumuLIN R VIAL) 5 unit 1X  ONCE IV  Last administered on 

3/17/20at 22:14;  Start 3/17/20 at 22:30;  Stop 3/17/20 at 22:31;  Status DC


Magnesium Sulfate 50 ml @ 25 mls/hr 1X  ONCE IV  Last administered on 3/18/20at 

02:57;  Start 3/18/20 at 03:00;  Stop 3/18/20 at 04:59;  Status DC


Calcium Gluconate 1000 mg/Sodium Chloride 110 ml @  220 mls/hr 1X  ONCE IV  Last

administered on 3/18/20at 02:46;  Start 3/18/20 at 03:00;  Stop 3/18/20 at 

03:29;  Status DC


Sodium Chloride 1,000 ml @  200 mls/hr Q5H IV  Last administered on 3/18/20at 

02:46;  Start 3/18/20 at 03:00;  Stop 3/18/20 at 10:21;  Status DC


Calcium Gluconate 1000 mg/Sodium Chloride 110 ml @  220 mls/hr 1X  ONCE IV  Last

administered on 3/18/20at 03:21;  Start 3/18/20 at 03:30;  Stop 3/18/20 at 

03:59;  Status DC


Sodium Bicarbonate 50 meq/Sodium Chloride 1,050 ml @  75 mls/hr Q14H IV  Last 

administered on 3/22/20at 21:10;  Start 3/18/20 at 07:30;  Stop 3/23/20 at 

10:28;  Status DC


Calcium Gluconate 2000 mg/Sodium Chloride 120 ml @  220 mls/hr 1X  ONCE IV  Last

administered on 3/18/20at 09:05;  Start 3/18/20 at 07:30;  Stop 3/18/20 at 

08:02;  Status DC


Lidocaine HCl (Xylocaine-Mpf 1% 2ml Vial) 2 ml STK-MED ONCE .ROUTE ;  Start 

3/18/20 at 08:47;  Stop 3/18/20 at 08:47;  Status DC


Meropenem 500 mg/ Sodium Chloride 50 ml @  100 mls/hr Q12HR IV  Last 

administered on 3/23/20at 21:01;  Start 3/18/20 at 18:00;  Stop 3/24/20 at 

07:58;  Status DC


Lidocaine HCl (Buffered Lidocaine 1%) 3 ml STK-MED ONCE .ROUTE ;  Start 3/18/20 

at 09:46;  Stop 3/18/20 at 09:46;  Status DC


Lidocaine HCl (Buffered Lidocaine 1%) 6 ml 1X  ONCE INJ  Last administered on 

3/18/20at 10:26;  Start 3/18/20 at 10:15;  Stop 3/18/20 at 10:16;  Status DC


Info (Tpn Per Pharmacy) 1 each PRN DAILY  PRN MC SEE COMMENTS Last administered 

on 7/26/20at 08:31;  Start 3/18/20 at 12:00;  Stop 7/26/20 at 10:41;  Status DC


Sodium Chloride 1,000 ml @  1,000 mls/hr Q1H PRN IV hypotension;  Start 3/18/20 

at 12:07;  Stop 3/18/20 at 18:06;  Status DC


Diphenhydramine HCl (Benadryl) 25 mg 1X PRN  PRN IV ITCHING;  Start 3/18/20 at 

12:15;  Stop 3/19/20 at 12:14;  Status DC


Diphenhydramine HCl (Benadryl) 25 mg 1X PRN  PRN IV ITCHING;  Start 3/18/20 at 

12:15;  Stop 3/19/20 at 12:14;  Status DC


Sodium Chloride 1,000 ml @  400 mls/hr Q2H30M PRN IV PATENCY;  Start 3/18/20 at 

12:07;  Stop 3/19/20 at 00:06;  Status DC


Info (PHARMACY MONITORING -- do not chart) 1 each PRN DAILY  PRN MC SEE 

COMMENTS;  Start 3/18/20 at 12:15;  Stop 3/20/20 at 08:13;  Status DC


Sodium Chloride 90 meq/Calcium Gluconate 10 meq/ Multivitamins 10 ml/Chromium/ 

Copper/Manganese/ Seleni/Zn 1 ml/ Total Parenteral Nutrition/Amino 

Acids/Dextrose/ Fat Emulsion Intravenous 55.005 ml  @ 2.292 mls/hr TPN  CONT IV 

;  Start 3/18/20 at 22:00;  Stop 3/18/20 at 12:33;  Status DC


Info (Tpn Per Pharmacy) 1 each PRN DAILY  PRN MC SEE COMMENTS;  Start 3/18/20 at

12:30;  Status UNV


Sodium Chloride 90 meq/Calcium Gluconate 10 meq/ Multivitamins 10 ml/Chromium/ 

Copper/Manganese/ Seleni/Zn 0.5 ml/ Total Parenteral Nutrition/Amino 

Acids/Dextrose/ Fat Emulsion Intravenous 1,512 ml @  63 mls/hr TPN  CONT IV  

Last administered on 3/18/20at 22:06;  Start 3/18/20 at 22:00;  Stop 3/19/20 at 

21:59;  Status DC


Calcium Carbonate/ Glycine (Tums) 500 mg PRN AFTMEALHC  PRN PO INDIGESTION;  

Start 3/18/20 at 17:45;  Stop 5/13/20 at 10:25;  Status DC


Calcium Gluconate (Calcium Gluconate) 2,000 mg 1X  ONCE IVP  Last administered 

on 3/19/20at 02:19;  Start 3/19/20 at 02:15;  Stop 3/19/20 at 02:16;  Status DC


Calcium Chloride 3000 mg/Sodium Chloride 1,030 ml @  50 mls/hr [id number] IV  Last 

administered on 3/21/20at 02:17;  Start 3/19/20 at 08:00;  Stop 3/21/20 at 15:23

;  Status DC


Lorazepam (Ativan Inj) 1 mg PRN Q4HRS  PRN IVP ANXIETY / AGITATION, 2nd choic 

Last administered on 4/17/20at 03:51;  Start 3/19/20 at 09:00;  Stop 4/17/20 at 

09:19;  Status DC


Sodium Chloride 1,000 ml @  1,000 mls/hr Q1H PRN IV hypotension;  Start 3/19/20 

at 08:56;  Stop 3/19/20 at 14:55;  Status DC


Albumin Human 200 ml @  200 mls/hr 1X PRN  PRN IV Hypotension;  Start 3/19/20 at

09:00;  Stop 3/19/20 at 14:59;  Status DC


Diphenhydramine HCl (Benadryl) 25 mg 1X PRN  PRN IV ITCHING;  Start 3/19/20 at 

09:00;  Stop 3/20/20 at 08:59;  Status DC


Diphenhydramine HCl (Benadryl) 25 mg 1X PRN  PRN IV ITCHING;  Start 3/19/20 at 

09:00;  Stop 3/20/20 at 08:59;  Status DC


Sodium Chloride 1,000 ml @  400 mls/hr Q2H30M PRN IV PATENCY;  Start 3/19/20 at 

08:56;  Stop 3/19/20 at 20:55;  Status DC


Info (PHARMACY MONITORING -- do not chart) 1 each PRN DAILY  PRN MC SEE 

COMMENTS;  Start 3/19/20 at 09:00;  Status UNV


Info (PHARMACY MONITORING -- do not chart) 1 each PRN DAILY  PRN MC SEE 

COMMENTS;  Start 3/19/20 at 09:00;  Stop 3/20/20 at 08:13;  Status DC


Digoxin (Lanoxin) 500 mcg 1X  ONCE IV  Last administered on 3/19/20at 10:04;  

Start 3/19/20 at 10:00;  Stop 3/19/20 at 10:01;  Status DC


Digoxin (Lanoxin) 125 mcg 1X  ONCE IV  Last administered on 3/19/20at 17:10;  

Start 3/19/20 at 18:00;  Stop 3/19/20 at 18:01;  Status DC


Magnesium Sulfate 100 ml @  25 mls/hr 1X  ONCE IV  Last administered on 

3/19/20at 12:48;  Start 3/19/20 at 13:00;  Stop 3/19/20 at 16:59;  Status DC


Sodium Chloride 90 meq/Magnesium Sulfate 10 meq/ Calcium Gluconate 20 meq/ 

Multivitamins 10 ml/Chromium/ Copper/Manganese/ Seleni/Zn 0.5 ml/ Total 

Parenteral Nutrition/Amino Acids/Dextrose/ Fat Emulsion Intravenous 1,512 ml @  

63 mls/hr TPN  CONT IV  Last administered on 3/19/20at 22:25;  Start 3/19/20 at 

22:00;  Stop 3/20/20 at 21:59;  Status DC


Sodium Chloride 1,000 ml @  1,000 mls/hr Q1H PRN IV hypotension;  Start 3/20/20 

at 08:05;  Stop 3/20/20 at 14:04;  Status DC


Albumin Human 200 ml @  200 mls/hr 1X  ONCE IV  Last administered on 3/20/20at 

08:57;  Start 3/20/20 at 08:15;  Stop 3/20/20 at 09:14;  Status DC


Diphenhydramine HCl (Benadryl) 25 mg 1X PRN  PRN IV ITCHING;  Start 3/20/20 at 

08:15;  Stop 3/21/20 at 08:14;  Status DC


Diphenhydramine HCl (Benadryl) 25 mg 1X PRN  PRN IV ITCHING;  Start 3/20/20 at 

08:15;  Stop 3/21/20 at 08:14;  Status DC


Sodium Chloride 1,000 ml @  400 mls/hr Q2H30M PRN IV PATENCY;  Start 3/20/20 at 

08:05;  Stop 3/20/20 at 20:04;  Status DC


Info (PHARMACY MONITORING -- do not chart) 1 each PRN DAILY  PRN MC SEE 

COMMENTS;  Start 3/20/20 at 08:15;  Stop 3/24/20 at 07:57;  Status DC


Sodium Chloride 90 meq/Potassium Chloride 15 meq/ Potassium Phosphate 10 mmol/ 

Magnesium Sulfate 10 meq/Calcium Gluconate 20 meq/ Multivitamins 10 ml/Chromium/

Copper/Manganese/ Seleni/Zn 0.5 ml/ Total Parenteral Nutrition/Amino 

Acids/Dextrose/ Fat Emulsion Intravenous 1,512 ml @  63 mls/hr TPN  CONT IV  

Last administered on 3/20/20at 21:01;  Start 3/20/20 at 22:00;  Stop 3/21/20 at 

21:59;  Status DC


Potassium Chloride/Water 100 ml @  100 mls/hr 1X  ONCE IV  Last administered on 

3/20/20at 14:09;  Start 3/20/20 at 14:00;  Stop 3/20/20 at 14:59;  Status DC


Benzocaine (Hurricaine One) 1 spray 1X  ONCE MM  Last administered on 3/20/20at 

16:38;  Start 3/20/20 at 14:30;  Stop 3/20/20 at 14:31;  Status DC


Lidocaine HCl (Glydo (Lidocaine) Jelly) 1 thomas 1X  ONCE MM  Last administered on 

3/20/20at 16:38;  Start 3/20/20 at 14:30;  Stop 3/20/20 at 14:31;  Status DC


Linezolid/Dextrose 300 ml @  300 mls/hr Q12HR IV  Last administered on 3/26/20at

21:04;  Start 3/20/20 at 20:00;  Stop 3/27/20 at 07:50;  Status DC


Acetaminophen (Tylenol) 650 mg PRN Q6HRS  PRN PO MILD PAIN / TEMP;  Start 

3/21/20 at 03:30;  Stop 3/21/20 at 03:36;  Status DC


Acetaminophen (Tylenol) 650 mg PRN Q6HRS  PRN PEG MILD PAIN / TEMP Last 

administered on 4/16/20at 19:56;  Start 3/21/20 at 03:36;  Stop 5/13/20 at 

10:25;  Status DC


Sodium Chloride 1,000 ml @  1,000 mls/hr Q1H PRN IV hypotension;  Start 3/21/20 

at 07:50;  Stop 3/21/20 at 13:49;  Status DC


Albumin Human 200 ml @  200 mls/hr 1X PRN  PRN IV Hypotension;  Start 3/21/20 at

08:00;  Stop 3/21/20 at 13:59;  Status DC


Sodium Chloride (Normal Saline Flush) 10 ml 1X PRN  PRN IV AP catheter pack;  

Start 3/21/20 at 08:00;  Stop 3/22/20 at 07:59;  Status DC


Sodium Chloride (Normal Saline Flush) 10 ml 1X PRN  PRN IV  catheter pack;  

Start 3/21/20 at 08:00;  Stop 3/22/20 at 07:59;  Status DC


Sodium Chloride 1,000 ml @  400 mls/hr Q2H30M PRN IV PATENCY;  Start 3/21/20 at 

07:50;  Stop 3/21/20 at 19:49;  Status DC


Info (PHARMACY MONITORING -- do not chart) 1 each PRN DAILY  PRN MC SEE 

COMMENTS;  Start 3/21/20 at 08:00;  Status UNV


Info (PHARMACY MONITORING -- do not chart) 1 each PRN DAILY  PRN MC SEE 

COMMENTS;  Start 3/21/20 at 08:00;  Stop 3/23/20 at 08:25;  Status DC


Sodium Chloride 90 meq/Potassium Chloride 15 meq/ Potassium Phosphate 10 mmol/ 

Magnesium Sulfate 10 meq/Calcium Gluconate 20 meq/ Multivitamins 10 ml/Chromium/

Copper/Manganese/ Seleni/Zn 0.5 ml/ Total Parenteral Nutrition/Amino 

Acids/Dextrose/ Fat Emulsion Intravenous 1,512 ml @  63 mls/hr TPN  CONT IV  

Last administered on 3/21/20at 20:57;  Start 3/21/20 at 22:00;  Stop 3/22/20 at 

21:59;  Status DC


Sodium Chloride 90 meq/Potassium Chloride 15 meq/ Potassium Phosphate 15 mmol/ 

Magnesium Sulfate 10 meq/Calcium Gluconate 20 meq/ Multivitamins 10 ml/Chromium/

Copper/Manganese/ Seleni/Zn 0.5 ml/ Total Parenteral Nutrition/Amino 

Acids/Dextrose/ Fat Emulsion Intravenous 1,512 ml @  63 mls/hr TPN  CONT IV ;  

Start 3/22/20 at 22:00;  Stop 3/22/20 at 14:16;  Status DC


Sodium Chloride 90 meq/Potassium Chloride 15 meq/ Potassium Phosphate 15 mmol/ 

Magnesium Sulfate 10 meq/Calcium Gluconate 20 meq/ Multivitamins 10 ml/Chromium/

Copper/Manganese/ Seleni/Zn 0.5 ml/ Total Parenteral Nutrition/Amino 

Acids/Dextrose/ Fat Emulsion Intravenous 1,200 ml @  50 mls/hr TPN  CONT IV ;  

Start 3/22/20 at 22:00;  Stop 3/22/20 at 14:17;  Status DC


Sodium Chloride 90 meq/Potassium Chloride 15 meq/ Potassium Phosphate 10 mmol/ 

Magnesium Sulfate 10 meq/Calcium Gluconate 20 meq/ Multivitamins 10 ml/Chromium/

Copper/Manganese/ Seleni/Zn 0.5 ml/ Total Parenteral Nutrition/Amino 

Acids/Dextrose/ Fat Emulsion Intravenous 1,200 ml @  50 mls/hr TPN  CONT IV  

Last administered on 3/22/20at 23:29;  Start 3/22/20 at 22:00;  Stop 3/23/20 at 

21:59;  Status DC


Sodium Chloride 1,000 ml @  1,000 mls/hr Q1H PRN IV hypotension;  Start 3/23/20 

at 07:28;  Stop 3/23/20 at 13:27;  Status DC


Albumin Human 200 ml @  200 mls/hr 1X  ONCE IV  Last administered on 3/23/20at 

08:51;  Start 3/23/20 at 07:30;  Stop 3/23/20 at 08:29;  Status DC


Diphenhydramine HCl (Benadryl) 25 mg 1X PRN  PRN IV ITCHING;  Start 3/23/20 at 

07:30;  Stop 3/24/20 at 07:29;  Status DC


Diphenhydramine HCl (Benadryl) 25 mg 1X PRN  PRN IV ITCHING;  Start 3/23/20 at 

07:30;  Stop 3/24/20 at 07:29;  Status DC


Sodium Chloride 1,000 ml @  400 mls/hr Q2H30M PRN IV PATENCY;  Start 3/23/20 at 

07:28;  Stop 3/23/20 at 19:27;  Status DC


Info (PHARMACY MONITORING -- do not chart) 1 each PRN DAILY  PRN MC SEE 

COMMENTS;  Start 3/23/20 at 07:30;  Stop 4/3/20 at 13:01;  Status DC


Metronidazole 100 ml @  100 mls/hr Q6HRS IV  Last administered on 4/8/20at 

06:26;  Start 3/23/20 at 08:30;  Stop 4/8/20 at 09:58;  Status DC


Micafungin Sodium 100 mg/Dextrose 100 ml @  100 mls/hr Q24H IV  Last 

administered on 4/30/20at 08:18;  Start 3/23/20 at 09:00;  Stop 4/30/20 at 

20:58;  Status DC


Propofol 0 ml @ As Directed STK-MED ONCE IV ;  Start 3/23/20 at 07:53;  Stop 

3/23/20 at 07:53;  Status DC


Etomidate (Amidate) 20 mg STK-MED ONCE IV ;  Start 3/23/20 at 07:53;  Stop 

3/23/20 at 07:54;  Status DC


Midazolam HCl (Versed) 5 mg STK-MED ONCE .ROUTE ;  Start 3/23/20 at 07:57;  Stop

3/23/20 at 07:57;  Status DC


Fentanyl Citrate 30 ml @ 0 mls/hr CONT  PRN IV SEE PROTOCOL Last administered on

4/17/20at 06:12;  Start 3/23/20 at 08:15;  Stop 4/17/20 at 09:19;  Status DC


Artificial Tears (Artificial Tears) 1 drop PRN Q1HR  PRN OU DRY EYE, 1st choice;

 Start 3/23/20 at 08:15;  Stop 4/29/20 at 05:31;  Status DC


Midazolam HCl 50 mg/Sodium Chloride 50 ml @ 0 mls/hr CONT  PRN IV SEE PROTOCOL 

Last administered on 3/26/20at 22:39;  Start 3/23/20 at 08:15;  Stop 3/28/20 at 

15:59;  Status DC


Etomidate (Amidate) 8 mg 1X  ONCE IV  Last administered on 3/23/20at 08:33;  

Start 3/23/20 at 08:30;  Stop 3/23/20 at 08:31;  Status DC


Succinylcholine Chloride (Anectine) 120 mg 1X  ONCE IV  Last administered on 

3/23/20at 08:34;  Start 3/23/20 at 08:30;  Stop 3/23/20 at 08:31;  Status DC


Midazolam HCl (Versed) 5 mg 1X  ONCE IV ;  Start 3/23/20 at 08:30;  Stop 3/23/20

at 08:31;  Status DC


Potassium Chloride 15 meq/ Bicarbonate Dialysis Soln w/ out KCl 5,007.5 ml  @ 

1,000 mls/ hr Q5H1M IV  Last administered on 3/24/20at 11:11;  Start 3/23/20 at 

12:00;  Stop 3/24/20 at 11:15;  Status DC


Potassium Chloride 15 meq/ Bicarbonate Dialysis Soln w/ out KCl 5,007.5 ml  @ 

1,000 mls/ hr Q5H1M IV  Last administered on 3/24/20at 11:12;  Start 3/23/20 at 

12:00;  Stop 3/24/20 at 11:17;  Status DC


Potassium Chloride 15 meq/ Bicarbonate Dialysis Soln w/ out KCl 5,007.5 ml  @ 

1,000 mls/ hr Q5H1M IV  Last administered on 3/24/20at 11:11;  Start 3/23/20 at 

12:00;  Stop 3/24/20 at 11:19;  Status DC


Sodium Chloride 90 meq/Potassium Chloride 15 meq/ Potassium Phosphate 10 mmol/ 

Magnesium Sulfate 10 meq/Calcium Gluconate 20 meq/ Multivitamins 10 ml/Chromium/

Copper/Manganese/ Seleni/Zn 0.5 ml/ Total Parenteral Nutrition/Amino 

Acids/Dextrose/ Fat Emulsion Intravenous 1,400 ml @  58.333 mls/ hr TPN  CONT IV

 Last administered on 3/23/20at 21:42;  Start 3/23/20 at 22:00;  Stop 3/24/20 at

21:59;  Status DC


Heparin Sodium (Porcine) (Heparin Sodium) 5,000 unit Q8HRS SQ  Last administered

on 3/28/20at 05:55;  Start 3/23/20 at 15:00;  Stop 3/28/20 at 13:28;  Status DC


Meropenem 500 mg/ Sodium Chloride 50 ml @  100 mls/hr Q6HRS IV  Last 

administered on 3/25/20at 06:00;  Start 3/24/20 at 09:00;  Stop 3/25/20 at 

07:29;  Status DC


Potassium Phosphate 20 mmol/ Sodium Chloride 106.6667 ml @  51.667 m... 1X  ONCE

IV  Last administered on 3/24/20at 11:22;  Start 3/24/20 at 10:15;  Stop 3/24/20

at 12:18;  Status DC


Acetaminophen (Tylenol Supp) 650 mg PRN Q6HRS  PRN AK MILD PAIN / TEMP > 100.3'F

Last administered on 8/10/20at 15:43;  Start 3/24/20 at 10:30


Potassium Chloride/Water 100 ml @  100 mls/hr Q1H IV  Last administered on 3/24/

20at 12:12;  Start 3/24/20 at 11:00;  Stop 3/24/20 at 12:59;  Status DC


Potassium Chloride 20 meq/ Bicarbonate Dialysis Soln w/ out KCl 5,010 ml @  

1,000 mls/hr Q5H1M IV  Last administered on 3/25/20at 08:48;  Start 3/24/20 at 

12:00;  Stop 3/25/20 at 13:03;  Status DC


Potassium Chloride 20 meq/ Bicarbonate Dialysis Soln w/ out KCl 5,010 ml @  

1,000 mls/hr Q5H1M IV  Last administered on 3/29/20at 14:52;  Start 3/24/20 at 

11:30;  Stop 3/29/20 at 19:59;  Status DC


Potassium Chloride 20 meq/ Bicarbonate Dialysis Soln w/ out KCl 5,010 ml @  

1,000 mls/hr Q5H1M IV  Last administered on 3/29/20at 14:53;  Start 3/24/20 at 

11:30;  Stop 3/29/20 at 19:59;  Status DC


Sodium Chloride 90 meq/Potassium Chloride 15 meq/ Potassium Phosphate 15 mmol/ 

Magnesium Sulfate 10 meq/Calcium Gluconate 15 meq/ Multivitamins 10 ml/Chromium/

Copper/Manganese/ Seleni/Zn 0.5 ml/ Total Parenteral Nutrition/Amino Acid

s/Dextrose/ Fat Emulsion Intravenous 1,400 ml @  58.333 mls/ hr TPN  CONT IV  

Last administered on 3/24/20at 22:17;  Start 3/24/20 at 22:00;  Stop 3/25/20 at 

21:59;  Status DC


Cefepime HCl (Maxipime) 2 gm Q12HR IVP  Last administered on 4/7/20at 20:56;  St

art 3/25/20 at 09:00;  Stop 4/8/20 at 09:58;  Status DC


Daptomycin 500 mg/ Sodium Chloride 50 ml @  100 mls/hr Q48H IV  Last adm

inistered on 4/10/20at 09:57;  Start 3/25/20 at 08:30;  Stop 4/10/20 at 10:07;  

Status DC


Lidocaine HCl (Buffered Lidocaine 1%) 3 ml 1X  ONCE INJ  Last administered on 

3/25/20at 10:27;  Start 3/25/20 at 10:30;  Stop 3/25/20 at 10:31;  Status DC


Potassium Phosphate 20 mmol/ Sodium Chloride 106.6667 ml @  51.667 m... 1X  ONCE

IV  Last administered on 3/25/20at 12:51;  Start 3/25/20 at 13:00;  Stop 3/25/20

at 15:03;  Status DC


Sodium Chloride 90 meq/Potassium Chloride 15 meq/ Potassium Phosphate 18 mmol/ 

Magnesium Sulfate 8 meq/Calcium Gluconate 15 meq/ Multivitamins 10 ml/Chromium/ 

Copper/Manganese/ Seleni/Zn 0.5 ml/ Total Parenteral Nutrition/Amino 

Acids/Dextrose/ Fat Emulsion Intravenous 1,400 ml @  58.333 mls/ hr TPN  CONT IV

 Last administered on 3/25/20at 22:16;  Start 3/25/20 at 22:00;  Stop 3/26/20 at

21:59;  Status DC


Potassium Chloride 20 meq/ Bicarbonate Dialysis Soln w/ out KCl 5,010 ml @  

1,000 mls/hr Q5H1M IV  Last administered on 3/29/20at 14:54;  Start 3/25/20 at 

16:00;  Stop 3/29/20 at 19:59;  Status DC


Multi-Ingred Cream/Lotion/Oil/ Oint (Artificial Tears Eye Ointment) 1 thomas PRN 

Q1HR  PRN OU DRY EYE, 2nd choice Last administered on 4/13/20at 08:19;  Start 

3/25/20 at 17:30;  Stop 6/3/20 at 14:39;  Status DC


Sodium Chloride 90 meq/Potassium Chloride 15 meq/ Potassium Phosphate 18 mmol/ 

Magnesium Sulfate 8 meq/Calcium Gluconate 15 meq/ Multivitamins 10 ml/Chromium/ 

Copper/Manganese/ Seleni/Zn 0.5 ml/ Total Parenteral Nutrition/Amino 

Acids/Dextrose/ Fat Emulsion Intravenous 1,400 ml @  58.333 mls/ hr TPN  CONT IV

 Last administered on 3/26/20at 22:00;  Start 3/26/20 at 22:00;  Stop 3/27/20 at

21:59;  Status DC


Albumin Human 500 ml @  125 mls/hr 1X  ONCE IV ;  Start 3/26/20 at 14:15;  Stop 

3/26/20 at 18:14;  Status DC


Sodium Chloride 90 meq/Potassium Chloride 15 meq/ Potassium Phosphate 18 mmol/ 

Magnesium Sulfate 8 meq/Calcium Gluconate 15 meq/ Multivitamins 10 ml/Chromium/ 

Copper/Manganese/ Seleni/Zn 0.5 ml/ Insulin Human Regular 10 unit/ Total 

Parenteral Nutrition/Amino Acids/Dextrose/ Fat Emulsion Intravenous 1,400 ml @  

58.333 mls/ hr TPN  CONT IV  Last administered on 3/27/20at 21:43;  Start 

3/27/20 at 22:00;  Stop 3/28/20 at 21:59;  Status DC


Lidocaine HCl (Buffered Lidocaine 1%) 3 ml STK-MED ONCE .ROUTE ;  Start 3/25/20 

at 10:00;  Stop 3/27/20 at 13:57;  Status DC


Midazolam HCl 100 mg/Sodium Chloride 100 ml @ 7 mls/hr CONT  PRN IV SEE PROTOCOL

Last administered on 4/8/20at 15:35;  Start 3/28/20 at 16:00;  Stop 6/3/20 at 

14:38;  Status DC


Sodium Chloride 90 meq/Potassium Chloride 15 meq/ Potassium Phosphate 18 mmol/ 

Magnesium Sulfate 8 meq/Calcium Gluconate 15 meq/ Multivitamins 10 ml/Chromium/ 

Copper/Manganese/ Seleni/Zn 0.5 ml/ Insulin Human Regular 15 unit/ Total 

Parenteral Nutrition/Amino Acids/Dextrose/ Fat Emulsion Intravenous 1,400 ml @  

58.333 mls/ hr TPN  CONT IV  Last administered on 3/28/20at 20:34;  Start 

3/28/20 at 22:00;  Stop 3/29/20 at 21:59;  Status DC


Info ([facility] Electrolyte Protocol) 1 ea CONT PRN  PRN MC PER PROTOCOL;  Start 

3/29/20 at 13:15;  Stop 8/17/20 at 17:07;  Status DC


Sodium Chloride 90 meq/Potassium Chloride 15 meq/ Potassium Phosphate 18 mmol/ 

Magnesium Sulfate 8 meq/Calcium Gluconate 15 meq/ Multivitamins 10 ml/Chromium/ 

Copper/Manganese/ Seleni/Zn 0.5 ml/ Insulin Human Regular 15 unit/ Total 

Parenteral Nutrition/Amino Acids/Dextrose/ Fat Emulsion Intravenous 1,400 ml @  

58.333 mls/ hr TPN  CONT IV  Last administered on 3/29/20at 22:05;  Start 3/29

/20 at 22:00;  Stop 3/30/20 at 21:59;  Status DC


Potassium Chloride 15 meq/ Bicarbonate Dialysis Soln w/ out KCl 5,007.5 ml  @ 1

,000 mls/ hr Q5H1M IV  Last administered on 4/1/20at 18:14;  Start 3/29/20 at 

20:00;  Stop 4/2/20 at 13:08;  Status DC


Potassium Chloride 15 meq/ Bicarbonate Dialysis Soln w/ out KCl 5,007.5 ml  @ 

1,000 mls/ hr Q5H1M IV  Last administered on 4/1/20at 18:14;  Start 3/29/20 at 

20:00;  Stop 4/2/20 at 13:08;  Status DC


Potassium Chloride 15 meq/ Bicarbonate Dialysis Soln w/ out KCl 5,007.5 ml  @ 

1,000 mls/ hr Q5H1M IV  Last administered on 4/1/20at 18:14;  Start 3/29/20 at 

20:00;  Stop 4/2/20 at 13:08;  Status DC


Iohexol (Omnipaque 240 Mg/ml) 30 ml 1X  ONCE PO  Last administered on 3/30/20at 

11:30;  Start 3/30/20 at 11:30;  Stop 3/30/20 at 11:33;  Status DC


Info (CONTRAST GIVEN -- Rx MONITORING) 1 each PRN DAILY  PRN MC SEE COMMENTS;  

Start 3/30/20 at 11:45;  Stop 4/1/20 at 11:44;  Status DC


Sodium Chloride 90 meq/Potassium Chloride 15 meq/ Potassium Phosphate 18 mmol/ 

Magnesium Sulfate 8 meq/Calcium Gluconate 15 meq/ Multivitamins 10 ml/Chromium/ 

Copper/Manganese/ Seleni/Zn 0.5 ml/ Insulin Human Regular 15 unit/ Total 

Parenteral Nutrition/Amino Acids/Dextrose/ Fat Emulsion Intravenous 1,400 ml @  

58.333 mls/ hr TPN  CONT IV  Last administered on 3/30/20at 21:47;  Start 

3/30/20 at 22:00;  Stop 3/31/20 at 21:59;  Status DC


Sodium Chloride 90 meq/Potassium Chloride 15 meq/ Potassium Phosphate 18 mmol/ 

Magnesium Sulfate 8 meq/Calcium Gluconate 15 meq/ Multivitamins 10 ml/Chromium/ 

Copper/Manganese/ Seleni/Zn 0.5 ml/ Insulin Human Regular 20 unit/ Total 

Parenteral Nutrition/Amino Acids/Dextrose/ Fat Emulsion Intravenous 1,400 ml @  

58.333 mls/ hr TPN  CONT IV  Last administered on 3/31/20at 21:36;  Start 

3/31/20 at 22:00;  Stop 4/1/20 at 21:59;  Status DC


Alteplase, Recombinant (Cathflo For Central Catheter Clearance) 1 mg 1X  ONCE 

INT CAT  Last administered on 3/31/20at 20:03;  Start 3/31/20 at 19:30;  Stop 

3/31/20 at 19:46;  Status DC


Alteplase, Recombinant (Cathflo For Central Catheter Clearance) 1 mg 1X  ONCE 

INT CAT  Last administered on 3/31/20at 22:05;  Start 3/31/20 at 22:00;  Stop 

3/31/20 at 22:01;  Status DC


Sodium Chloride 90 meq/Potassium Chloride 15 meq/ Potassium Phosphate 18 mmol/ 

Magnesium Sulfate 8 meq/Calcium Gluconate 15 meq/ Multivitamins 10 ml/Chromium/ 

Copper/Manganese/ Seleni/Zn 0.5 ml/ Insulin Human Regular 20 unit/ Total 

Parenteral Nutrition/Amino Acids/Dextrose/ Fat Emulsion Intravenous 1,400 ml @  

58.333 mls/ hr TPN  CONT IV  Last administered on 4/1/20at 21:30;  Start 4/1/20 

at 22:00;  Stop 4/2/20 at 21:59;  Status DC


Dexmedetomidine HCl 400 mcg/ Sodium Chloride 100 ml @ 0 mls/hr CONT  PRN IV AN

XIETY / AGITATION Last administered on 5/30/20at 12:57;  Start 4/2/20 at 08:15; 

Stop 5/30/20 at 18:31;  Status DC


Sodium Chloride 500 ml @  500 mls/hr 1X PRN  PRN IV ELEVATED BP, SEE COMMENTS;  

Start 4/2/20 at 08:15;  Stop 8/5/20 at 09:08;  Status DC


Atropine Sulfate (ATROPINE 0.5mg SYRINGE) 0.5 mg PRN Q5MIN  PRN IV SEE COMMENTS;

 Start 4/2/20 at 08:15;  Stop 8/3/20 at 09:45;  Status DC


Furosemide (Lasix) 20 mg 1X  ONCE IVP  Last administered on 4/2/20at 08:19;  

Start 4/2/20 at 08:15;  Stop 4/2/20 at 08:16;  Status DC


Lidocaine HCl (Buffered Lidocaine 1%) 3 ml STK-MED ONCE .ROUTE ;  Start 4/2/20 

at 08:39;  Stop 4/2/20 at 08:39;  Status DC


Lidocaine HCl (Buffered Lidocaine 1%) 6 ml 1X  ONCE INJ  Last administered on 

4/2/20at 09:05;  Start 4/2/20 at 09:00;  Stop 4/2/20 at 09:06;  Status DC


Sodium Chloride 90 meq/Potassium Chloride 15 meq/ Potassium Phosphate 18 mmol/ 

Magnesium Sulfate 8 meq/Calcium Gluconate 15 meq/ Multivitamins 10 ml/Chromium/ 

Copper/Manganese/ Seleni/Zn 0.5 ml/ Insulin Human Regular 20 unit/ Total 

Parenteral Nutrition/Amino Acids/Dextrose/ Fat Emulsion Intravenous 1,400 ml @  

58.333 mls/ hr TPN  CONT IV  Last administered on 4/2/20at 22:45;  Start 4/2/20 

at 22:00;  Stop 4/3/20 at 21:59;  Status DC


Sodium Chloride 1,000 ml @  1,000 mls/hr Q1H PRN IV hypotension;  Start 4/3/20 

at 07:30;  Stop 4/3/20 at 13:29;  Status DC


Albumin Human 200 ml @  200 mls/hr 1X PRN  PRN IV Hypotension Last administered 

on 4/3/20at 09:36;  Start 4/3/20 at 07:30;  Stop 4/3/20 at 13:29;  Status DC


Sodium Chloride (Normal Saline Flush) 10 ml 1X PRN  PRN IV AP catheter pack;  

Start 4/3/20 at 07:30;  Stop 4/3/20 at 21:29;  Status DC


Sodium Chloride (Normal Saline Flush) 10 ml 1X PRN  PRN IV  catheter pack;  

Start 4/3/20 at 07:30;  Stop 4/4/20 at 07:29;  Status DC


Sodium Chloride 1,000 ml @  400 mls/hr Q2H30M PRN IV PATENCY;  Start 4/3/20 at 

07:30;  Stop 4/3/20 at 19:29;  Status DC


Info (PHARMACY MONITORING -- do not chart) 1 each PRN DAILY  PRN MC SEE 

COMMENTS;  Start 4/3/20 at 07:30;  Stop 4/3/20 at 13:02;  Status DC


Info (PHARMACY MONITORING -- do not chart) 1 each PRN DAILY  PRN MC SEE 

COMMENTS;  Start 4/3/20 at 07:30;  Stop 4/5/20 at 12:45;  Status DC


Sodium Chloride 90 meq/Potassium Chloride 15 meq/ Potassium Phosphate 10 mmol/ 

Magnesium Sulfate 8 meq/Calcium Gluconate 15 meq/ Multivitamins 10 ml/Chromium/ 

Copper/Manganese/ Seleni/Zn 0.5 ml/ Insulin Human Regular 25 unit/ Total 

Parenteral Nutrition/Amino Acids/Dextrose/ Fat Emulsion Intravenous 1,400 ml @  

58.333 mls/ hr TPN  CONT IV  Last administered on 4/3/20at 22:19;  Start 4/3/20 

at 22:00;  Stop 4/4/20 at 21:59;  Status DC


Heparin Sodium (Porcine) (Heparin Sodium) 5,000 unit Q12HR SQ  Last administered

on 4/26/20at 08:59;  Start 4/3/20 at 21:00;  Stop 4/26/20 at 10:05;  Status DC


Ondansetron HCl (Zofran) 4 mg PRN Q6HRS  PRN IV NAUSEA/VOMITING;  Start 4/6/20 

at 07:00;  Stop 4/7/20 at 06:59;  Status DC


Fentanyl Citrate (Fentanyl 2ml Vial) 25 mcg PRN Q5MIN  PRN IV MILD PAIN 1-3;  

Start 4/6/20 at 07:00;  Stop 4/7/20 at 06:59;  Status DC


Fentanyl Citrate (Fentanyl 2ml Vial) 50 mcg PRN Q5MIN  PRN IV MODERATE TO SEVERE

PAIN;  Start 4/6/20 at 07:00;  Stop 4/7/20 at 06:59;  Status DC


Ringer's Solution 1,000 ml @  30 mls/hr Q24H IV ;  Start 4/6/20 at 07:00;  Stop 

4/6/20 at 18:59;  Status DC


Lidocaine HCl (Xylocaine-Mpf 1% 2ml Vial) 2 ml PRN 1X  PRN ID PRIOR TO IV START;

 Start 4/6/20 at 07:00;  Stop 4/7/20 at 06:59;  Status DC


Prochlorperazine Edisylate (Compazine) 5 mg PACU PRN  PRN IV NAUSEA, MRX1;  

Start 4/6/20 at 07:00;  Stop 4/7/20 at 06:59;  Status DC


Sodium Chloride 1,000 ml @  1,000 mls/hr Q1H PRN IV hypotension;  Start 4/4/20 

at 09:10;  Stop 4/4/20 at 15:09;  Status DC


Albumin Human 200 ml @  200 mls/hr 1X PRN  PRN IV Hypotension Last administered 

on 4/4/20at 10:10;  Start 4/4/20 at 09:15;  Stop 4/4/20 at 15:14;  Status DC


Sodium Chloride 1,000 ml @  400 mls/hr Q2H30M PRN IV PATENCY;  Start 4/4/20 at 

09:10;  Stop 4/4/20 at 21:09;  Status DC


Info (PHARMACY MONITORING -- do not chart) 1 each PRN DAILY  PRN MC SEE 

COMMENTS;  Start 4/4/20 at 09:15;  Stop 4/5/20 at 12:45;  Status DC


Info (PHARMACY MONITORING -- do not chart) 1 each PRN DAILY  PRN MC SEE 

COMMENTS;  Start 4/4/20 at 09:15;  Stop 4/5/20 at 12:45;  Status DC


Sodium Chloride 90 meq/Potassium Chloride 15 meq/ Potassium Phosphate 10 mmol/ 

Magnesium Sulfate 8 meq/Calcium Gluconate 15 meq/ Multivitamins 10 ml/Chromium/ 

Copper/Manganese/ Seleni/Zn 0.5 ml/ Insulin Human Regular 25 unit/ Total Par

enteral Nutrition/Amino Acids/Dextrose/ Fat Emulsion Intravenous 1,400 ml @  

58.333 mls/ hr TPN  CONT IV  Last administered on 4/4/20at 22:10;  Start 4/4/20 

at 22:00;  Stop 4/5/20 at 21:59;  Status DC


Magnesium Sulfate 50 ml @ 25 mls/hr PRN DAILY  PRN IV for Mag < 1.7 on am labs 

Last administered on 6/18/20at 10:57;  Start 4/5/20 at 09:15;  Stop 8/17/20 at 

17:07;  Status DC


Sodium Chloride 90 meq/Potassium Chloride 15 meq/ Potassium Phosphate 10 mmol/ 

Magnesium Sulfate 8 meq/Calcium Gluconate 15 meq/ Multivitamins 10 ml/Chromium/ 

Copper/Manganese/ Seleni/Zn 0.5 ml/ Insulin Human Regular 25 unit/ Total 

Parenteral Nutrition/Amino Acids/Dextrose/ Fat Emulsion Intravenous 1,400 ml @  

58.333 mls/ hr TPN  CONT IV  Last administered on 4/5/20at 21:20;  Start 4/5/20 

at 22:00;  Stop 4/6/20 at 21:59;  Status DC


Sodium Chloride 1,000 ml @  1,000 mls/hr Q1H PRN IV hypotension;  Start 4/5/20 

at 12:23;  Stop 4/5/20 at 18:22;  Status DC


Albumin Human 200 ml @  200 mls/hr 1X  ONCE IV  Last administered on 4/5/20at 

13:34;  Start 4/5/20 at 12:30;  Stop 4/5/20 at 13:29;  Status DC


Diphenhydramine HCl (Benadryl) 25 mg 1X PRN  PRN IV ITCHING;  Start 4/5/20 at 

12:30;  Stop 4/6/20 at 12:29;  Status DC


Diphenhydramine HCl (Benadryl) 25 mg 1X PRN  PRN IV ITCHING;  Start 4/5/20 at 

12:30;  Stop 4/6/20 at 12:29;  Status DC


Info (PHARMACY MONITORING -- do not chart) 1 each PRN DAILY  PRN MC SEE 

COMMENTS;  Start 4/5/20 at 12:30;  Status Cancel


Bupivacaine HCl/ Epinephrine Bitart (Sensorcain-Epi 0.5%-1:304625 Mpf) 30 ml 

STK-MED ONCE .ROUTE  Last administered on 4/6/20at 11:44;  Start 4/6/20 at 

11:00;  Stop 4/6/20 at 11:01;  Status DC


Cellulose (Surgicel Fibrillar 1x2) 1 each STK-MED ONCE .ROUTE ;  Start 4/6/20 at

11:00;  Stop 4/6/20 at 11:01;  Status DC


Sodium Chloride 90 meq/Potassium Chloride 15 meq/ Potassium Phosphate 10 mmol/ 

Magnesium Sulfate 12 meq/Calcium Gluconate 15 meq/ Multivitamins 10 ml/Chromium/

Copper/Manganese/ Seleni/Zn 0.5 ml/ Insulin Human Regular 25 unit/ Total 

Parenteral Nutrition/Amino Acids/Dextrose/ Fat Emulsion Intravenous 1,400 ml @  

58.333 mls/ hr TPN  CONT IV  Last administered on 4/6/20at 22:24;  Start 4/6/20 

at 22:00;  Stop 4/7/20 at 21:59;  Status DC


Propofol 20 ml @ As Directed STK-MED ONCE IV ;  Start 4/6/20 at 11:07;  Stop 

4/6/20 at 11:07;  Status DC


Cellulose (Surgicel Hemostat 4x8) 1 each STK-MED ONCE .ROUTE  Last administered 

on 4/6/20at 11:44;  Start 4/6/20 at 11:55;  Stop 4/6/20 at 11:56;  Status DC


Sevoflurane (Ultane) 60 ml STK-MED ONCE IH ;  Start 4/6/20 at 12:46;  Stop 

4/6/20 at 12:46;  Status DC


Sodium Chloride 1,000 ml @  1,000 mls/hr Q1H PRN IV hypotension;  Start 4/6/20 

at 13:51;  Stop 4/6/20 at 19:50;  Status DC


Albumin Human 200 ml @  200 mls/hr 1X PRN  PRN IV Hypotension Last administered 

on 4/6/20at 14:51;  Start 4/6/20 at 14:00;  Stop 4/6/20 at 19:59;  Status DC


Diphenhydramine HCl (Benadryl) 25 mg 1X PRN  PRN IV ITCHING;  Start 4/6/20 at 

14:00;  Stop 4/7/20 at 13:59;  Status DC


Diphenhydramine HCl (Benadryl) 25 mg 1X PRN  PRN IV ITCHING;  Start 4/6/20 at 

14:00;  Stop 4/7/20 at 13:59;  Status DC


Sodium Chloride 1,000 ml @  400 mls/hr Q2H30M PRN IV PATENCY;  Start 4/6/20 at 

13:51;  Stop 4/7/20 at 01:50;  Status DC


Info (PHARMACY MONITORING -- do not chart) 1 each PRN DAILY  PRN MC SEE 

COMMENTS;  Start 4/6/20 at 14:00;  Stop 4/9/20 at 08:16;  Status DC


Heparin Sodium (Porcine) (Hep Lock Adult) 500 unit STK-MED ONCE IVP ;  Start 

4/7/20 at 09:29;  Stop 4/7/20 at 09:30;  Status DC


Sodium Chloride 1,000 ml @  1,000 mls/hr Q1H PRN IV hypotension;  Start 4/7/20 

at 10:43;  Stop 4/7/20 at 16:42;  Status DC


Sodium Chloride 1,000 ml @  400 mls/hr Q2H30M PRN IV PATENCY;  Start 4/7/20 at 

10:43;  Stop 4/7/20 at 22:42;  Status DC


Info (PHARMACY MONITORING -- do not chart) 1 each PRN DAILY  PRN MC SEE 

COMMENTS;  Start 4/7/20 at 10:45;  Status UNV


Info (PHARMACY MONITORING -- do not chart) 1 each PRN DAILY  PRN MC SEE 

COMMENTS;  Start 4/7/20 at 10:45;  Status UNV


Sodium Chloride 90 meq/Potassium Chloride 15 meq/ Magnesium Sulfate 12 

meq/Calcium Gluconate 15 meq/ Multivitamins 10 ml/Chromium/ Copper/Manganese/ 

Seleni/Zn 0.5 ml/ Insulin Human Regular 25 unit/ Total Parenteral 

Nutrition/Amino Acids/Dextrose/ Fat Emulsion Intravenous 1,400 ml @  58.333 mls/

hr TPN  CONT IV  Last administered on 4/7/20at 22:13;  Start 4/7/20 at 22:00;  

Stop 4/8/20 at 21:59;  Status DC


Sodium Chloride 1,000 ml @  1,000 mls/hr Q1H PRN IV hypotension;  Start 4/8/20 

at 07:50;  Stop 4/8/20 at 13:49;  Status DC


Albumin Human 200 ml @  200 mls/hr 1X  ONCE IV ;  Start 4/8/20 at 08:00;  Stop 

4/8/20 at 08:53;  Status DC


Diphenhydramine HCl (Benadryl) 25 mg 1X PRN  PRN IV ITCHING;  Start 4/8/20 at 

08:00;  Stop 4/9/20 at 07:59;  Status DC


Diphenhydramine HCl (Benadryl) 25 mg 1X PRN  PRN IV ITCHING;  Start 4/8/20 at 

08:00;  Stop 4/9/20 at 07:59;  Status DC


Info (PHARMACY MONITORING -- do not chart) 1 each PRN DAILY  PRN MC SEE 

COMMENTS;  Start 4/8/20 at 08:00;  Stop 4/9/20 at 08:16;  Status DC


Albumin Human 50 ml @ 50 mls/hr 1X  ONCE IV ;  Start 4/8/20 at 08:53;  Stop 

4/8/20 at 08:56;  Status DC


Albumin Human 200 ml @  50 mls/hr PRN 1X  PRN IV HYPOTENSION Last administered 

on 4/14/20at 11:54;  Start 4/8/20 at 09:00;  Stop 5/21/20 at 11:14;  Status DC


Meropenem 500 mg/ Sodium Chloride 50 ml @  100 mls/hr Q12H IV  Last administered

on 4/28/20at 10:45;  Start 4/8/20 at 10:00;  Stop 4/28/20 at 12:37;  Status DC


Sodium Chloride 90 meq/Magnesium Sulfate 12 meq/ Calcium Gluconate 15 meq/ 

Multivitamins 10 ml/Chromium/ Copper/Manganese/ Seleni/Zn 0.5 ml/ Insulin Human 

Regular 25 unit/ Total Parenteral Nutrition/Amino Acids/Dextrose/ Fat Emulsion 

Intravenous 1,400 ml @  58.333 mls/ hr TPN  CONT IV  Last administered on 

4/8/20at 21:41;  Start 4/8/20 at 22:00;  Stop 4/9/20 at 21:59;  Status DC


Sodium Chloride 1,000 ml @  1,000 mls/hr Q1H PRN IV hypotension;  Start 4/9/20 

at 07:58;  Stop 4/9/20 at 13:57;  Status DC


Albumin Human 200 ml @  200 mls/hr 1X PRN  PRN IV Hypotension Last administered 

on 4/9/20at 09:30;  Start 4/9/20 at 08:00;  Stop 4/9/20 at 13:59;  Status DC


Sodium Chloride 1,000 ml @  400 mls/hr Q2H30M PRN IV PATENCY;  Start 4/9/20 at 

07:58;  Stop 4/9/20 at 19:57;  Status DC


Info (PHARMACY MONITORING -- do not chart) 1 each PRN DAILY  PRN MC SEE 

COMMENTS;  Start 4/9/20 at 08:00;  Status Cancel


Info (PHARMACY MONITORING -- do not chart) 1 each PRN DAILY  PRN MC SEE 

COMMENTS;  Start 4/9/20 at 08:15;  Status UNV


Sodium Chloride 90 meq/Potassium Phosphate 5 mmol/ Magnesium Sulfate 12 

meq/Calcium Gluconate 15 meq/ Multivitamins 10 ml/Chromium/ Copper/Manganese/ 

Seleni/Zn 0.5 ml/ Insulin Human Regular 30 unit/ Total Parenteral 

Nutrition/Amino Acids/Dextrose/ Fat Emulsion Intravenous 1,400 ml @  58.333 mls/

hr TPN  CONT IV  Last administered on 4/9/20at 22:08;  Start 4/9/20 at 22:00;  

Stop 4/10/20 at 21:59;  Status DC


Linezolid/Dextrose 300 ml @  300 mls/hr Q12HR IV  Last administered on 4/20/20at

20:40;  Start 4/10/20 at 11:00;  Stop 4/21/20 at 08:10;  Status DC


Sodium Chloride 90 meq/Potassium Phosphate 15 mmol/ Magnesium Sulfate 12 

meq/Calcium Gluconate 15 meq/ Multivitamins 10 ml/Chromium/ Copper/Manganese/ 

Seleni/Zn 0.5 ml/ Insulin Human Regular 30 unit/ Total Parenteral 

Nutrition/Amino Acids/Dextrose/ Fat Emulsion Intravenous 1,400 ml @  58.333 mls/

hr TPN  CONT IV  Last administered on 4/10/20at 21:49;  Start 4/10/20 at 22:00; 

Stop 4/11/20 at 21:59;  Status DC


Sodium Chloride 90 meq/Potassium Phosphate 15 mmol/ Magnesium Sulfate 12 

meq/Calcium Gluconate 15 meq/ Multivitamins 10 ml/Chromium/ Copper/Manganese/ 

Seleni/Zn 0.5 ml/ Insulin Human Regular 40 unit/ Total Parenteral 

Nutrition/Amino Acids/Dextrose/ Fat Emulsion Intravenous 1,400 ml @  58.333 mls/

hr TPN  CONT IV  Last administered on 4/11/20at 21:21;  Start 4/11/20 at 22:00; 

Stop 4/12/20 at 21:59;  Status DC


Sodium Chloride 1,000 ml @  1,000 mls/hr Q1H PRN IV hypotension;  Start 4/11/20 

at 13:26;  Stop 4/11/20 at 19:25;  Status DC


Albumin Human 200 ml @  200 mls/hr 1X PRN  PRN IV Hypotension Last administered 

on 4/11/20at 15:00;  Start 4/11/20 at 13:30;  Stop 4/11/20 at 19:29;  Status DC


Sodium Chloride (Normal Saline Flush) 10 ml 1X PRN  PRN IV AP catheter pack;  

Start 4/11/20 at 13:30;  Stop 4/12/20 at 13:29;  Status DC


Sodium Chloride (Normal Saline Flush) 10 ml 1X PRN  PRN IV  catheter pack;  

Start 4/11/20 at 13:30;  Stop 4/12/20 at 13:29;  Status DC


Sodium Chloride 1,000 ml @  400 mls/hr Q2H30M PRN IV PATENCY;  Start 4/11/20 at 

13:26;  Stop 4/12/20 at 01:25;  Status DC


Info (PHARMACY MONITORING -- do not chart) 1 each PRN DAILY  PRN MC SEE 

COMMENTS;  Start 4/11/20 at 13:30;  Stop 4/11/20 at 13:33;  Status DC


Info (PHARMACY MONITORING -- do not chart) 1 each PRN DAILY  PRN MC SEE 

COMMENTS;  Start 4/11/20 at 13:30;  Stop 4/11/20 at 13:34;  Status DC


Sodium Chloride 90 meq/Potassium Phosphate 19 mmol/ Magnesium Sulfate 12 

meq/Calcium Gluconate 15 meq/ Multivitamins 10 ml/Chromium/ Copper/Manganese/ 

Seleni/Zn 0.5 ml/ Insulin Human Regular 40 unit/ Total Parenteral 

Nutrition/Amino Acids/Dextrose/ Fat Emulsion Intravenous 1,400 ml @  58.333 mls/

hr TPN  CONT IV  Last administered on 4/12/20at 21:54;  Start 4/12/20 at 22:00; 

Stop 4/13/20 at 21:59;  Status DC


Sodium Chloride 1,000 ml @  1,000 mls/hr Q1H PRN IV hypotension;  Start 4/13/20 

at 09:35;  Stop 4/13/20 at 15:34;  Status DC


Albumin Human 200 ml @  200 mls/hr 1X PRN  PRN IV Hypotension;  Start 4/13/20 at

09:45;  Stop 4/13/20 at 15:44;  Status DC


Diphenhydramine HCl (Benadryl) 25 mg 1X PRN  PRN IV ITCHING;  Start 4/13/20 at 

09:45;  Stop 4/14/20 at 09:44;  Status DC


Diphenhydramine HCl (Benadryl) 25 mg 1X PRN  PRN IV ITCHING;  Start 4/13/20 at 

09:45;  Stop 4/14/20 at 09:44;  Status DC


Sodium Chloride 1,000 ml @  400 mls/hr Q2H30M PRN IV PATENCY;  Start 4/13/20 at 

09:35;  Stop 4/13/20 at 21:34;  Status DC


Info (PHARMACY MONITORING -- do not chart) 1 each PRN DAILY  PRN MC SEE 

COMMENTS;  Start 4/13/20 at 09:45;  Status Cancel


Sodium Chloride 100 meq/Potassium Phosphate 19 mmol/ Magnesium Sulfate 12 

meq/Calcium Gluconate 15 meq/ Multivitamins 10 ml/Chromium/ Copper/Manganese/ 

Seleni/Zn 0.5 ml/ Insulin Human Regular 40 unit/ Potassium Chloride 20 meq/ 

Total Parenteral Nutrition/Amino Acids/Dextrose/ Fat Emulsion Intravenous 1,400 

ml @  58.333 mls/ hr TPN  CONT IV  Last administered on 4/13/20at 22:02;  Start 

4/13/20 at 22:00;  Stop 4/14/20 at 21:59;  Status DC


Furosemide (Lasix) 40 mg 1X  ONCE IVP  Last administered on 4/13/20at 14:39;  

Start 4/13/20 at 14:30;  Stop 4/13/20 at 14:31;  Status DC


Metronidazole 100 ml @  100 mls/hr Q8HRS IV  Last administered on 4/21/20at 

06:04;  Start 4/14/20 at 10:00;  Stop 4/21/20 at 08:10;  Status DC


Sodium Chloride 1,000 ml @  1,000 mls/hr Q1H PRN IV hypotension;  Start 4/14/20 

at 08:00;  Stop 4/14/20 at 13:59;  Status DC


Albumin Human 200 ml @  200 mls/hr 1X PRN  PRN IV Hypotension;  Start 4/14/20 at

08:00;  Stop 4/14/20 at 13:59;  Status DC


Sodium Chloride 1,000 ml @  400 mls/hr Q2H30M PRN IV PATENCY;  Start 4/14/20 at 

08:00;  Stop 4/14/20 at 19:59;  Status DC


Info (PHARMACY MONITORING -- do not chart) 1 each PRN DAILY  PRN MC SEE 

COMMENTS;  Start 4/14/20 at 11:30;  Status UNV


Info (PHARMACY MONITORING -- do not chart) 1 each PRN DAILY  PRN MC SEE 

COMMENTS;  Start 4/14/20 at 11:30;  Stop 4/16/20 at 12:13;  Status DC


Sodium Chloride 100 meq/Potassium Phosphate 19 mmol/ Magnesium Sulfate 12 

meq/Calcium Gluconate 15 meq/ Multivitamins 10 ml/Chromium/ Copper/Manganese/ 

Seleni/Zn 0.5 ml/ Insulin Human Regular 40 unit/ Potassium Chloride 20 meq/ 

Total Parenteral Nutrition/Amino Acids/Dextrose/ Fat Emulsion Intravenous 1,400 

ml @  58.333 mls/ hr TPN  CONT IV  Last administered on 4/14/20at 21:52;  Start 

4/14/20 at 22:00;  Stop 4/15/20 at 21:59;  Status DC


Sodium Chloride (Normal Saline Flush) 10 ml QSHIFT  PRN IV AFTER MEDS AND BLOOD 

DRAWS;  Start 4/14/20 at 15:00;  Stop 5/12/20 at 11:27;  Status DC


Sodium Chloride (Normal Saline Flush) 10 ml PRN Q5MIN  PRN IV AFTER MEDS AND 

BLOOD DRAWS;  Start 4/14/20 at 15:00;  Stop 8/1/20 at 10:12;  Status DC


Sodium Chloride (Normal Saline Flush) 20 ml PRN Q5MIN  PRN IV AFTER MEDS AND 

BLOOD DRAWS;  Start 4/14/20 at 15:00;  Stop 8/17/20 at 17:07;  Status DC


Sodium Chloride 100 meq/Potassium Phosphate 19 mmol/ Magnesium Sulfate 12 

meq/Calcium Gluconate 15 meq/ Multivitamins 10 ml/Chromium/ Copper/Manganese/ Se

aram/Zn 0.5 ml/ Insulin Human Regular 40 unit/ Potassium Chloride 20 meq/ Total 

Parenteral Nutrition/Amino Acids/Dextrose/ Fat Emulsion Intravenous 1,400 ml @  

58.333 mls/ hr TPN  CONT IV  Last administered on 4/15/20at 21:20;  Start 

4/15/20 at 22:00;  Stop 4/16/20 at 21:59;  Status DC


Lidocaine HCl (Buffered Lidocaine 1%) 3 ml STK-MED ONCE .ROUTE ;  Start 4/15/20 

at 13:16;  Stop 4/15/20 at 13:16;  Status DC


Lidocaine HCl (Buffered Lidocaine 1%) 6 ml 1X  ONCE INJ  Last administered on 

4/15/20at 13:45;  Start 4/15/20 at 13:30;  Stop 4/15/20 at 13:31;  Status DC


Albumin Human 100 ml @  100 mls/hr 1X  ONCE IV  Last administered on 4/15/20at 

15:41;  Start 4/15/20 at 15:00;  Stop 4/15/20 at 15:59;  Status DC


Albumin Human 50 ml @ 50 mls/hr 1X  ONCE IV  Last administered on 4/15/20at 

15:00;  Start 4/15/20 at 15:00;  Stop 4/15/20 at 15:59;  Status DC


Info (PHARMACY MONITORING -- do not chart) 1 each PRN DAILY  PRN MC SEE 

COMMENTS;  Start 4/16/20 at 11:30;  Status Cancel


Info (PHARMACY MONITORING -- do not chart) 1 each PRN DAILY  PRN MC SEE 

COMMENTS;  Start 4/16/20 at 11:30;  Status UNV


Sodium Chloride 100 meq/Potassium Phosphate 10 mmol/ Magnesium Sulfate 12 

meq/Calcium Gluconate 15 meq/ Multivitamins 10 ml/Chromium/ Copper/Manganese/ 

Seleni/Zn 0.5 ml/ Insulin Human Regular 35 unit/ Potassium Chloride 20 meq/ 

Total Parenteral Nutrition/Amino Acids/Dextrose/ Fat Emulsion Intravenous 1,400 

ml @  58.333 mls/ hr TPN  CONT IV  Last administered on 4/16/20at 22:10;  Start 

4/16/20 at 22:00;  Stop 4/17/20 at 21:59;  Status DC


Sodium Chloride 100 meq/Potassium Phosphate 5 mmol/ Magnesium Sulfate 12 

meq/Calcium Gluconate 15 meq/ Multivitamins 10 ml/Chromium/ Copper/Manganese/ 

Seleni/Zn 0.5 ml/ Insulin Human Regular 35 unit/ Potassium Chloride 20 meq/ 

Total Parenteral Nutrition/Amino Acids/Dextrose/ Fat Emulsion Intravenous 1,400 

ml @  58.333 mls/ hr TPN  CONT IV  Last administered on 4/17/20at 22:59;  Start 

4/17/20 at 22:00;  Stop 4/18/20 at 21:59;  Status DC


Sodium Chloride 1,000 ml @  1,000 mls/hr Q1H PRN IV hypotension;  Start 4/18/20 

at 08:27;  Stop 4/18/20 at 14:26;  Status DC


Albumin Human 200 ml @  200 mls/hr 1X PRN  PRN IV Hypotension Last administered 

on 4/18/20at 09:18;  Start 4/18/20 at 08:30;  Stop 4/18/20 at 14:29;  Status DC


Sodium Chloride 1,000 ml @  400 mls/hr Q2H30M PRN IV PATENCY;  Start 4/18/20 at 

08:27;  Stop 4/18/20 at 20:26;  Status DC


Info (PHARMACY MONITORING -- do not chart) 1 each PRN DAILY  PRN MC SEE 

COMMENTS;  Start 4/18/20 at 08:30;  Status Cancel


Info (PHARMACY MONITORING -- do not chart) 1 each PRN DAILY  PRN MC SEE 

COMMENTS;  Start 4/18/20 at 08:30;  Stop 4/26/20 at 13:10;  Status DC


Sodium Chloride 100 meq/Potassium Chloride 40 meq/ Magnesium Sulfate 15 

meq/Calcium Gluconate 15 meq/ Multivitamins 10 ml/Chromium/ Copper/Manganese/ 

Seleni/Zn 0.5 ml/ Insulin Human Regular 35 unit/ Total Parenteral 

Nutrition/Amino Acids/Dextrose/ Fat Emulsion Intravenous 1,400 ml @  58.333 mls/

hr TPN  CONT IV  Last administered on 4/18/20at 22:00;  Start 4/18/20 at 22:00; 

Stop 4/19/20 at 21:59;  Status DC


Potassium Chloride/Water 100 ml @  100 mls/hr 1X  ONCE IV  Last administered on 

4/18/20at 17:28;  Start 4/18/20 at 14:45;  Stop 4/18/20 at 15:44;  Status DC


Sodium Chloride 100 meq/Potassium Chloride 40 meq/ Magnesium Sulfate 15 

meq/Calcium Gluconate 15 meq/ Multivitamins 10 ml/Chromium/ Copper/Manganese/ 

Seleni/Zn 0.5 ml/ Insulin Human Regular 35 unit/ Total Parenteral 

Nutrition/Amino Acids/Dextrose/ Fat Emulsion Intravenous 1,400 ml @  58.333 mls/

hr TPN  CONT IV  Last administered on 4/19/20at 22:46;  Start 4/19/20 at 22:00; 

Stop 4/20/20 at 21:59;  Status DC


Sodium Chloride 100 meq/Potassium Chloride 40 meq/ Magnesium Sulfate 20 

meq/Calcium Gluconate 15 meq/ Multivitamins 10 ml/Chromium/ Copper/Manganese/ 

Seleni/Zn 0.5 ml/ Insulin Human Regular 35 unit/ Total Parenteral 

Nutrition/Amino Acids/Dextrose/ Fat Emulsion Intravenous 1,400 ml @  58.333 mls/

hr TPN  CONT IV  Last administered on 4/20/20at 22:31;  Start 4/20/20 at 22:00; 

Stop 4/21/20 at 21:59;  Status DC


Fentanyl Citrate (Fentanyl 2ml Vial) 50 mcg PRN Q2HR  PRN IVP PAIN Last 

administered on 4/27/20at 13:32;  Start 4/20/20 at 21:00;  Stop 4/28/20 at 

12:53;  Status DC


Fentanyl Citrate (Fentanyl 2ml Vial) 25 mcg PRN Q2HR  PRN IVP PAIN;  Start 

4/20/20 at 21:00;  Stop 4/28/20 at 12:54;  Status DC


Enoxaparin Sodium (Lovenox 100mg Syringe) 100 mg Q12HR SQ ;  Start 4/21/20 at 

21:00;  Status UNV


Amino Acids/ Glycerin/ Electrolytes 1,000 ml @  75 mls/hr [id number] IV ;  Start 

4/20/20 at 21:15;  Status UNV


Sodium Chloride 1,000 ml @  1,000 mls/hr Q1H PRN IV hypotension;  Start 4/21/20 

at 07:56;  Stop 4/21/20 at 13:55;  Status DC


Albumin Human 200 ml @  200 mls/hr 1X PRN  PRN IV Hypotension Last administered 

on 4/21/20at 08:40;  Start 4/21/20 at 08:00;  Stop 4/21/20 at 13:59;  Status DC


Sodium Chloride 1,000 ml @  400 mls/hr Q2H30M PRN IV PATENCY;  Start 4/21/20 at 

07:56;  Stop 4/21/20 at 19:55;  Status DC


Info (PHARMACY MONITORING -- do not chart) 1 each PRN DAILY  PRN MC SEE 

COMMENTS;  Start 4/21/20 at 08:00;  Status UNV


Info (PHARMACY MONITORING -- do not chart) 1 each PRN DAILY  PRN MC SEE 

COMMENTS;  Start 4/21/20 at 08:00;  Status UNV


Daptomycin 430 mg/ Sodium Chloride 50 ml @  100 mls/hr Q24H IV  Last 

administered on 4/21/20at 12:35;  Start 4/21/20 at 09:00;  Stop 4/21/20 at 

12:49;  Status DC


Sodium Chloride 100 meq/Potassium Chloride 40 meq/ Magnesium Sulfate 20 

meq/Calcium Gluconate 15 meq/ Multivitamins 10 ml/Chromium/ Copper/Manganese/ 

Seleni/Zn 0.5 ml/ Insulin Human Regular 35 unit/ Total Parenteral 

Nutrition/Amino Acids/Dextrose/ Fat Emulsion Intravenous 1,400 ml @  58.333 mls/

hr TPN  CONT IV  Last administered on 4/21/20at 21:26;  Start 4/21/20 at 22:00; 

Stop 4/22/20 at 21:59;  Status DC


Daptomycin 430 mg/ Sodium Chloride 50 ml @  100 mls/hr Q48H IV ;  Start 4/23/20 

at 09:00;  Stop 4/22/20 at 11:55;  Status DC


Sodium Chloride 100 meq/Potassium Chloride 40 meq/ Magnesium Sulfate 20 

meq/Calcium Gluconate 15 meq/ Multivitamins 10 ml/Chromium/ Copper/Manganese/ 

Seleni/Zn 0.5 ml/ Insulin Human Regular 35 unit/ Total Parenteral 

Nutrition/Amino Acids/Dextrose/ Fat Emulsion Intravenous 1,400 ml @  58.333 mls/

hr TPN  CONT IV  Last administered on 4/22/20at 22:27;  Start 4/22/20 at 22:00; 

Stop 4/23/20 at 21:59;  Status DC


Daptomycin 430 mg/ Sodium Chloride 50 ml @  100 mls/hr Q24H IV  Last 

administered on 4/24/20at 15:07;  Start 4/22/20 at 13:00;  Stop 4/25/20 at 

13:15;  Status DC


Sodium Chloride 100 meq/Potassium Chloride 40 meq/ Magnesium Sulfate 20 

meq/Calcium Gluconate 10 meq/ Multivitamins 10 ml/Chromium/ Copper/Manganese/ 

Seleni/Zn 0.5 ml/ Insulin Human Regular 35 unit/ Total Parenteral 

Nutrition/Amino Acids/Dextrose/ Fat Emulsion Intravenous 1,400 ml @  58.333 mls/

hr TPN  CONT IV  Last administered on 4/24/20at 00:06;  Start 4/23/20 at 22:00; 

Stop 4/24/20 at 21:59;  Status DC


Alteplase, Recombinant (Cathflo For Central Catheter Clearance) 1 mg 1X  ONCE 

INT CAT  Last administered on 4/24/20at 11:44;  Start 4/24/20 at 10:45;  Stop 

4/24/20 at 10:46;  Status DC


Ondansetron HCl (Zofran) 4 mg PRN Q6HRS  PRN IV NAUSEA/VOMITING;  Start 4/27/20 

at 07:00;  Stop 4/28/20 at 06:59;  Status DC


Fentanyl Citrate (Fentanyl 2ml Vial) 25 mcg PRN Q5MIN  PRN IV MILD PAIN 1-3;  

Start 4/27/20 at 07:00;  Stop 4/28/20 at 06:59;  Status DC


Fentanyl Citrate (Fentanyl 2ml Vial) 50 mcg PRN Q5MIN  PRN IV MODERATE TO SEVERE

PAIN Last administered on 4/27/20at 10:17;  Start 4/27/20 at 07:00;  Stop 

4/28/20 at 06:59;  Status DC


Ringer's Solution 1,000 ml @  30 mls/hr Q24H IV ;  Start 4/27/20 at 07:00;  Stop

4/27/20 at 18:59;  Status DC


Lidocaine HCl (Xylocaine-Mpf 1% 2ml Vial) 2 ml PRN 1X  PRN ID PRIOR TO IV START;

 Start 4/27/20 at 07:00;  Stop 4/28/20 at 06:59;  Status DC


Prochlorperazine Edisylate (Compazine) 5 mg PACU PRN  PRN IV NAUSEA, MRX1;  

Start 4/27/20 at 07:00;  Stop 4/28/20 at 06:59;  Status DC


Sodium Acetate 50 meq/Potassium Acetate 55 meq/ Magnesium Sulfate 20 meq/Calcium

Gluconate 10 meq/ Multivitamins 10 ml/Chromium/ Copper/Manganese/ Seleni/Zn 0.5 

ml/ Insulin Human Regular 35 unit/ Total Parenteral Nutrition/Amino 

Acids/Dextrose/ Fat Emulsion Intravenous 1,400 ml @  58.333 mls/ hr TPN  CONT IV

;  Start 4/24/20 at 22:00;  Stop 4/24/20 at 14:15;  Status DC


Sodium Acetate 50 meq/Potassium Acetate 55 meq/ Magnesium Sulfate 20 meq/Calcium

Gluconate 10 meq/ Multivitamins 10 ml/Chromium/ Copper/Manganese/ Seleni/Zn 0.5 

ml/ Insulin Human Regular 35 unit/ Total Parenteral Nutrition/Amino 

Acids/Dextrose/ Fat Emulsion Intravenous 1,800 ml @  75 mls/hr TPN  CONT IV  

Last administered on 4/24/20at 22:38;  Start 4/24/20 at 22:00;  Stop 4/25/20 at 

21:59;  Status DC


Sodium Chloride 1,000 ml @  1,000 mls/hr Q1H PRN IV hypotension;  Start 4/24/20 

at 15:31;  Stop 4/24/20 at 21:30;  Status DC


Diphenhydramine HCl (Benadryl) 25 mg 1X PRN  PRN IV ITCHING;  Start 4/24/20 at 

15:45;  Stop 4/25/20 at 15:44;  Status DC


Diphenhydramine HCl (Benadryl) 25 mg 1X PRN  PRN IV ITCHING;  Start 4/24/20 at 

15:45;  Stop 4/25/20 at 15:44;  Status DC


Sodium Chloride 1,000 ml @  400 mls/hr Q2H30M PRN IV PATENCY;  Start 4/24/20 at 

15:31;  Stop 4/25/20 at 03:30;  Status DC


Info (PHARMACY MONITORING -- do not chart) 1 each PRN DAILY  PRN MC SEE 

COMMENTS;  Start 4/24/20 at 15:45;  Stop 5/26/20 at 14:14;  Status DC


Sodium Acetate 50 meq/Potassium Acetate 55 meq/ Magnesium Sulfate 20 meq/Calcium

Gluconate 10 meq/ Multivitamins 10 ml/Chromium/ Copper/Manganese/ Seleni/Zn 0.5 

ml/ Insulin Human Regular 35 unit/ Total Parenteral Nutrition/Amino 

Acids/Dextrose/ Fat Emulsion Intravenous 1,800 ml @  75 mls/hr TPN  CONT IV  

Last administered on 4/25/20at 22:03;  Start 4/25/20 at 22:00;  Stop 4/26/20 at 

21:59;  Status DC


Daptomycin 430 mg/ Sodium Chloride 50 ml @  100 mls/hr Q24H IV  Last 

administered on 4/30/20at 13:00;  Start 4/25/20 at 13:00;  Stop 4/30/20 at 

20:58;  Status DC


Heparin Sodium (Porcine) 1000 unit/Sodium Chloride 1,001 ml @  1,001 mls/hr 1X  

ONCE IRR ;  Start 4/27/20 at 06:00;  Stop 4/27/20 at 06:59;  Status DC


Potassium Acetate 55 meq/Magnesium Sulfate 20 meq/ Calcium Gluconate 10 meq/ 

Multivitamins 10 ml/Chromium/ Copper/Manganese/ Seleni/Zn 0.5 ml/ Insulin Human 

Regular 35 unit/ Total Parenteral Nutrition/Amino Acids/Dextrose/ Fat Emulsion 

Intravenous 1,920 ml @  80 mls/hr TPN  CONT IV  Last administered on 4/26/20at 

22:10;  Start 4/26/20 at 22:00;  Stop 4/27/20 at 21:59;  Status DC


Dexamethasone Sodium Phosphate (Decadron) 4 mg STK-MED ONCE .ROUTE ;  Start 

4/27/20 at 10:56;  Stop 4/27/20 at 10:57;  Status DC


Ondansetron HCl (Zofran) 4 mg STK-MED ONCE .ROUTE ;  Start 4/27/20 at 10:56;  

Stop 4/27/20 at 10:57;  Status DC


Rocuronium Bromide (Zemuron) 50 mg STK-MED ONCE .ROUTE ;  Start 4/27/20 at 

10:56;  Stop 4/27/20 at 10:57;  Status DC


Fentanyl Citrate (Fentanyl 2ml Vial) 100 mcg STK-MED ONCE .ROUTE ;  Start 

4/27/20 at 10:56;  Stop 4/27/20 at 10:57;  Status DC


Bupivacaine HCl/ Epinephrine Bitart (Sensorcain-Epi 0.5%-1:[id number] Mpf) 30 ml 

STK-MED ONCE .ROUTE  Last administered on 4/27/20at 12:01;  Start 4/27/20 at 

10:58;  Stop 4/27/20 at 10:58;  Status DC


Cellulose (Surgicel Hemostat 2x14) 1 each STK-MED ONCE .ROUTE ;  Start 4/27/20 

at 10:58;  Stop 4/27/20 at 10:59;  Status DC


Iohexol (Omnipaque 300 Mg/ml) 50 ml STK-MED ONCE .ROUTE ;  Start 4/27/20 at 

10:58;  Stop 4/27/20 at 10:59;  Status DC


Cellulose (Surgicel Hemostat 4x8) 1 each STK-MED ONCE .ROUTE ;  Start 4/27/20 at

10:58;  Stop 4/27/20 at 10:59;  Status DC


Bisacodyl (Dulcolax Supp) 10 mg STK-MED ONCE .ROUTE ;  Start 4/27/20 at 10:59;  

Stop 4/27/20 at 10:59;  Status DC


Heparin Sodium (Porcine) 1000 unit/Sodium Chloride 1,001 ml @  1,001 mls/hr 1X  

ONCE IRR ;  Start 4/27/20 at 12:00;  Stop 4/27/20 at 12:59;  Status DC


Propofol 20 ml @ As Directed STK-MED ONCE IV ;  Start 4/27/20 at 11:05;  Stop 

4/27/20 at 11:05;  Status DC


Sevoflurane (Ultane) 90 ml STK-MED ONCE IH ;  Start 4/27/20 at 11:05;  Stop 

4/27/20 at 11:05;  Status DC


Sevoflurane (Ultane) 60 ml STK-MED ONCE IH ;  Start 4/27/20 at 12:26;  Stop 

4/27/20 at 12:27;  Status DC


Propofol 20 ml @ As Directed STK-MED ONCE IV ;  Start 4/27/20 at 12:26;  Stop 

4/27/20 at 12:27;  Status DC


Phenylephrine HCl (PHENYLEPHRINE in 0.9% NACL PF) 1 mg STK-MED ONCE IV ;  Start 

4/27/20 at 12:34;  Stop 4/27/20 at 12:34;  Status DC


Heparin Sodium (Porcine) (Heparin Sodium) 5,000 unit Q12HR SQ  Last administered

on 5/6/20at 20:57;  Start 4/27/20 at 21:00;  Stop 5/7/20 at 09:59;  Status DC


Sodium Chloride (Normal Saline Flush) 3 ml QSHIFT  PRN IV AFTER MEDS AND BLOOD 

DRAWS;  Start 4/27/20 at 13:45;  Status Cancel


Naloxone HCl (Narcan) 0.4 mg PRN Q2MIN  PRN IV SEE INSTRUCTIONS Last 

administered on 6/6/20at 15:15;  Start 4/27/20 at 13:45;  Stop 7/1/20 at 16:00; 

Status DC


Sodium Chloride 1,000 ml @  25 mls/hr Q24H IV  Last administered on 5/26/20at 

13:37;  Start 4/27/20 at 13:37;  Stop 5/29/20 at 13:09;  Status DC


Naloxone HCl (Narcan) 0.4 mg PRN Q2MIN  PRN IV SEE INSTRUCTIONS;  Start 4/27/20 

at 14:30;  Status UNV


Sodium Chloride 1,000 ml @  25 mls/hr Q24H IV ;  Start 4/27/20 at 14:30;  Status

UNV


Hydromorphone HCl 30 ml @ 0 mls/hr CONT PRN  PRN IV PER PROTOCOL Last 

administered on 5/2/20at 16:08;  Start 4/27/20 at 14:30;  Stop 5/4/20 at 08:55; 

Status DC


Potassium Acetate 55 meq/Magnesium Sulfate 20 meq/ Calcium Gluconate 10 meq/ 

Multivitamins 10 ml/Chromium/ Copper/Manganese/ Seleni/Zn 0.5 ml/ Insulin Human 

Regular 35 unit/ Total Parenteral Nutrition/Amino Acids/Dextrose/ Fat Emulsion 

Intravenous 1,920 ml @  80 mls/hr TPN  CONT IV  Last administered on 4/27/20at 

22:01;  Start 4/27/20 at 22:00;  Stop 4/28/20 at 21:59;  Status DC


Bumetanide (Bumex) 2 mg [id number] IV  Last administered on 5/1/20at 13:50;  Start 

4/28/20 at 14:00;  Stop 5/2/20 at 14:10;  Status DC


Meropenem 1 gm/ Sodium Chloride 100 ml @  200 mls/hr Q8HRS IV  Last administered

on 5/22/20at 05:53;  Start 4/28/20 at 14:00;  Stop 5/22/20 at 09:31;  Status DC


Potassium Acetate 55 meq/Magnesium Sulfate 20 meq/ Calcium Gluconate 10 meq/ Mul

tivitamins 10 ml/Chromium/ Copper/Manganese/ Seleni/Zn 0.5 ml/ Insulin Human 

Regular 35 unit/ Total Parenteral Nutrition/Amino Acids/Dextrose/ Fat Emulsion 

Intravenous 1,920 ml @  80 mls/hr TPN  CONT IV  Last administered on 4/28/20at 

22:02;  Start 4/28/20 at 22:00;  Stop 4/29/20 at 21:59;  Status DC


Hydromorphone HCl (Dilaudid Standard PCA) 12 mg STK-MED ONCE IV ;  Start 4/27/20

at 14:35;  Stop 4/28/20 at 13:53;  Status DC


Artificial Tears (Artificial Tears) 1 drop PRN Q15MIN  PRN OU DRY EYE Last 

administered on 6/23/20at 21:17;  Start 4/29/20 at 05:30


Hydromorphone HCl (Dilaudid Standard PCA) 12 mg STK-MED ONCE IV ;  Start 4/28/20

at 12:05;  Stop 4/29/20 at 09:15;  Status DC


Potassium Acetate 65 meq/Magnesium Sulfate 20 meq/ Calcium Gluconate 10 meq/ 

Multivitamins 10 ml/Chromium/ Copper/Manganese/ Seleni/Zn 0.5 ml/ Insulin Human 

Regular 30 unit/ Total Parenteral Nutrition/Amino Acids/Dextrose/ Fat Emulsion 

Intravenous 1,920 ml @  80 mls/hr TPN  CONT IV  Last administered on 4/29/20at 

22:22;  Start 4/29/20 at 22:00;  Stop 4/30/20 at 21:59;  Status DC


Cyclobenzaprine HCl (Flexeril) 10 mg PRN Q6HRS  PRN PO MUSCLE SPASMS Last 

administered on 8/15/20at 14:25;  Start 4/30/20 at 10:45


Potassium Acetate 55 meq/Magnesium Sulfate 20 meq/ Calcium Gluconate 10 meq/ 

Multivitamins 10 ml/Chromium/ Copper/Manganese/ Seleni/Zn 0.5 ml/ Insulin Human 

Regular 30 unit/ Total Parenteral Nutrition/Amino Acids/Dextrose/ Fat Emulsion 

Intravenous 1,920 ml @  80 mls/hr TPN  CONT IV  Last administered on 5/1/20at 

01:00;  Start 4/30/20 at 22:00;  Stop 5/1/20 at 21:59;  Status DC


Magnesium Sulfate 50 ml @ 25 mls/hr 1X  ONCE IV  Last administered on 4/30/20at 

17:18;  Start 4/30/20 at 12:45;  Stop 4/30/20 at 14:44;  Status DC


Potassium Chloride/Water 100 ml @  100 mls/hr 1X  ONCE IV  Last administered on 

5/1/20at 11:27;  Start 5/1/20 at 12:00;  Stop 5/1/20 at 12:59;  Status DC


Hydromorphone HCl (Dilaudid Standard PCA) 12 mg STK-MED ONCE IV ;  Start 4/29/20

at 10:50;  Stop 5/1/20 at 11:02;  Status DC


Hydromorphone HCl (Dilaudid Standard PCA) 12 mg STK-MED ONCE IV ;  Start 4/30/20

at 13:47;  Stop 5/1/20 at 11:03;  Status DC


Potassium Acetate 30 meq/Magnesium Sulfate 20 meq/ Calcium Gluconate 10 meq/ 

Multivitamins 10 ml/Chromium/ Copper/Manganese/ Seleni/Zn 0.5 ml/ Insulin Human 

Regular 30 unit/ Potassium Chloride 30 meq/ Total Parenteral Nutrition/Amino 

Acids/Dextrose/ Fat Emulsion Intravenous 1,920 ml @  80 mls/hr TPN  CONT IV  

Last administered on 5/1/20at 22:34;  Start 5/1/20 at 22:00;  Stop 5/2/20 at 

21:59;  Status DC


Potassium Chloride/Water 100 ml @  100 mls/hr Q1H IV  Last administered on 

5/2/20at 13:05;  Start 5/2/20 at 07:00;  Stop 5/2/20 at 10:59;  Status DC


Magnesium Sulfate 50 ml @ 25 mls/hr 1X  ONCE IV  Last administered on 5/2/20at 

10:34;  Start 5/2/20 at 10:30;  Stop 5/2/20 at 12:29;  Status DC


Potassium Chloride 75 meq/ Magnesium Sulfate 20 meq/Calcium Gluconate 10 meq/ 

Multivitamins 10 ml/Chromium/ Copper/Manganese/ Seleni/Zn 0.5 ml/ Insulin Human 

Regular 30 unit/ Total Parenteral Nutrition/Amino Acids/Dextrose/ Fat Emulsion 

Intravenous 1,920 ml @  80 mls/hr TPN  CONT IV  Last administered on 5/2/20at 

21:51;  Start 5/2/20 at 22:00;  Stop 5/3/20 at 22:00;  Status DC


Potassium Chloride 75 meq/ Magnesium Sulfate 20 meq/Calcium Gluconate 10 meq/ 

Multivitamins 10 ml/Chromium/ Copper/Manganese/ Seleni/Zn 0.5 ml/ Insulin Human 

Regular 25 unit/ Total Parenteral Nutrition/Amino Acids/Dextrose/ Fat Emulsion 

Intravenous 1,920 ml @  80 mls/hr TPN  CONT IV  Last administered on 5/3/20at 

22:04;  Start 5/3/20 at 22:00;  Stop 5/4/20 at 21:59;  Status DC


Hydromorphone HCl (Dilaudid) 0.4 mg PRN Q4HRS  PRN IVP PAIN Last administered on

5/4/20at 10:57;  Start 5/4/20 at 09:00;  Stop 5/4/20 at 18:59;  Status DC


Micafungin Sodium 100 mg/Dextrose 100 ml @  100 mls/hr Q24H IV  Last 

administered on 5/26/20at 12:17;  Start 5/4/20 at 11:00;  Stop 5/27/20 at 09:59;

 Status DC


Daptomycin 485 mg/ Sodium Chloride 50 ml @  100 mls/hr Q24H IV  Last 

administered on 5/11/20at 13:10;  Start 5/4/20 at 11:00;  Stop 5/12/20 at 07:44;

 Status DC


Potassium Chloride 75 meq/ Magnesium Sulfate 15 meq/Calcium Gluconate 8 meq/ 

Multivitamins 10 ml/Chromium/ Copper/Manganese/ Seleni/Zn 0.5 ml/ Insulin Human 

Regular 25 unit/ Total Parenteral Nutrition/Amino Acids/Dextrose/ Fat Emulsion 

Intravenous 1,920 ml @  80 mls/hr TPN  CONT IV  Last administered on 5/4/20at 

23:08;  Start 5/4/20 at 22:00;  Stop 5/5/20 at 21:59;  Status DC


Haloperidol Lactate (Haldol Inj) 3 mg 1X  ONCE IVP  Last administered on 

5/4/20at 14:37;  Start 5/4/20 at 14:30;  Stop 5/4/20 at 14:31;  Status DC


Hydromorphone HCl (Dilaudid) 1 mg PRN Q4HRS  PRN IVP PAIN Last administered on 

5/18/20at 06:25;  Start 5/4/20 at 19:00;  Stop 5/18/20 at 17:10;  Status DC


Potassium Chloride 75 meq/ Magnesium Sulfate 15 meq/Calcium Gluconate 8 meq/ 

Multivitamins 10 ml/Chromium/ Copper/Manganese/ Seleni/Zn 0.5 ml/ Insulin Human 

Regular 20 unit/ Total Parenteral Nutrition/Amino Acids/Dextrose/ Fat Emulsion 

Intravenous 1,920 ml @  80 mls/hr TPN  CONT IV  Last administered on 5/5/20at 

22:10;  Start 5/5/20 at 22:00;  Stop 5/6/20 at 21:59;  Status DC


Lidocaine HCl (Buffered Lidocaine 1%) 3 ml STK-MED ONCE .ROUTE ;  Start 5/6/20 

at 11:31;  Stop 5/6/20 at 11:31;  Status DC


Lidocaine HCl (Buffered Lidocaine 1%) 3 ml STK-MED ONCE .ROUTE ;  Start 5/6/20 

at 12:28;  Stop 5/6/20 at 12:29;  Status DC


Lidocaine HCl (Buffered Lidocaine 1%) 6 ml 1X  ONCE INJ  Last administered on 

5/6/20at 12:53;  Start 5/6/20 at 12:45;  Stop 5/6/20 at 12:46;  Status DC


Potassium Chloride 75 meq/ Magnesium Sulfate 15 meq/Calcium Gluconate 8 meq/ 

Multivitamins 10 ml/Chromium/ Copper/Manganese/ Seleni/Zn 0.5 ml/ Insulin Human 

Regular 20 unit/ Total Parenteral Nutrition/Amino Acids/Dextrose/ Fat Emulsion 

Intravenous 1,920 ml @  80 mls/hr TPN  CONT IV  Last administered on 5/6/20at 

22:00;  Start 5/6/20 at 22:00;  Stop 5/7/20 at 21:59;  Status DC


Potassium Chloride 75 meq/ Magnesium Sulfate 15 meq/Calcium Gluconate 8 meq/ 

Multivitamins 10 ml/Chromium/ Copper/Manganese/ Seleni/Zn 0.5 ml/ Insulin Human 

Regular 15 unit/ Total Parenteral Nutrition/Amino Acids/Dextrose/ Fat Emulsion 

Intravenous 1,920 ml @  80 mls/hr TPN  CONT IV  Last administered on 5/7/20at 

22:28;  Start 5/7/20 at 22:00;  Stop 5/8/20 at 21:59;  Status DC


Vecuronium Bromide (Norcuron Bolus) 6 mg PRN Q6HRS  PRN IV VENT ASYNCHRONY;  

Start 5/7/20 at 19:15;  Stop 5/7/20 at 19:35;  Status DC


Bumetanide (Bumex) 2 mg 1X  ONCE IV  Last administered on 5/7/20at 22:09;  Start

5/7/20 at 19:45;  Stop 5/7/20 at 19:46;  Status DC


Lidocaine HCl (Buffered Lidocaine 1%) 3 ml STK-MED ONCE .ROUTE ;  Start 5/8/20 

at 07:59;  Stop 5/8/20 at 07:59;  Status DC


Midazolam HCl (Versed) 5 mg STK-MED ONCE .ROUTE ;  Start 5/8/20 at 08:36;  Stop 

5/8/20 at 08:36;  Status DC


Fentanyl Citrate (Fentanyl 5ml Vial) 250 mcg STK-MED ONCE .ROUTE ;  Start 5/8/20

at 08:36;  Stop 5/8/20 at 08:37;  Status DC


Lidocaine HCl (Buffered Lidocaine 1%) 3 ml 1X  ONCE IJ  Last administered on 

5/8/20at 09:30;  Start 5/8/20 at 09:15;  Stop 5/8/20 at 09:16;  Status DC


Midazolam HCl (Versed) 5 mg 1X  ONCE IV  Last administered on 5/8/20at 09:30;  

Start 5/8/20 at 09:15;  Stop 5/8/20 at 09:16;  Status DC


Fentanyl Citrate (Fentanyl 5ml Vial) 250 mcg 1X  ONCE IV  Last administered on 

5/8/20at 09:30;  Start 5/8/20 at 09:15;  Stop 5/8/20 at 09:16;  Status DC


Bumetanide (Bumex) 2 mg DAILY IV  Last administered on 5/18/20at 08:07;  Start 5 /8/20 at 10:00;  Stop 5/18/20 at 17:15;  Status DC


Potassium Chloride 75 meq/ Magnesium Sulfate 15 meq/ Multivitamins 10 ml/

mium/ Copper/Manganese/ Seleni/Zn 0.5 ml/ Insulin Human Regular 15 unit/ Total 

Parenteral Nutrition/Amino Acids/Dextrose/ Fat Emulsion Intravenous 1,920 ml @  

80 mls/hr TPN  CONT IV  Last administered on 5/8/20at 21:59;  Start 5/8/20 at 

22:00;  Stop 5/9/20 at 21:59;  Status DC


Metoclopramide HCl (Reglan Vial) 10 mg PRN Q3HRS  PRN IVP NAUSEA/VOMITING-3rd 

choice Last administered on 5/14/20at 04:25;  Start 5/9/20 at 16:45;  Stop 

8/17/20 at 17:07;  Status DC


Potassium Chloride 75 meq/ Magnesium Sulfate 15 meq/ Multivitamins 10 

ml/Chromium/ Copper/Manganese/ Seleni/Zn 0.5 ml/ Insulin Human Regular 15 unit/ 

Total Parenteral Nutrition/Amino Acids/Dextrose/ Fat Emulsion Intravenous 1,920 

ml @  80 mls/hr TPN  CONT IV  Last administered on 5/9/20at 22:41;  Start 5/9/20

at 22:00;  Stop 5/10/20 at 21:59;  Status DC


Magnesium Sulfate 50 ml @ 25 mls/hr 1X  ONCE IV  Last administered on 5/10/20at 

10:44;  Start 5/10/20 at 09:00;  Stop 5/10/20 at 10:59;  Status DC


Potassium Chloride/Water 100 ml @  100 mls/hr 1X  ONCE IV  Last administered on 

5/10/20at 09:37;  Start 5/10/20 at 09:00;  Stop 5/10/20 at 09:59;  Status DC


Duloxetine HCl (Cymbalta) 30 mg DAILY PO  Last administered on 5/11/20at 09:48; 

Start 5/10/20 at 14:00;  Stop 5/13/20 at 10:25;  Status DC


Potassium Chloride 80 meq/ Magnesium Sulfate 20 meq/ Multivitamins 10 

ml/Chromium/ Copper/Manganese/ Seleni/Zn 0.5 ml/ Insulin Human Regular 15 unit/ 

Total Parenteral Nutrition/Amino Acids/Dextrose/ Fat Emulsion Intravenous 1,920 

ml @  80 mls/hr TPN  CONT IV  Last administered on 5/10/20at 21:42;  Start 

5/10/20 at 22:00;  Stop 5/11/20 at 21:59;  Status DC


Potassium Chloride 80 meq/ Magnesium Sulfate 20 meq/ Multivitamins 10 

ml/Chromium/ Copper/Manganese/ Seleni/Zn 0.5 ml/ Insulin Human Regular 15 unit/ 

Total Parenteral Nutrition/Amino Acids/Dextrose/ Fat Emulsion Intravenous 1,920 

ml @  80 mls/hr TPN  CONT IV  Last administered on 5/11/20at 22:20;  Start 

5/11/20 at 22:00;  Stop 5/12/20 at 21:59;  Status DC


Lidocaine HCl (Buffered Lidocaine 1%) 3 ml STK-MED ONCE .ROUTE ;  Start 5/12/20 

at 09:54;  Stop 5/12/20 at 09:55;  Status DC


Hydromorphone HCl (Dilaudid Standard PCA) 12 mg STK-MED ONCE IV ;  Start 5/1/20 

at 15:50;  Stop 5/12/20 at 11:24;  Status DC


Potassium Chloride 80 meq/ Magnesium Sulfate 20 meq/ Multivitamins 10 ml/Chrom

ium/ Copper/Manganese/ Seleni/Zn 0.5 ml/ Insulin Human Regular 15 unit/ Total 

Parenteral Nutrition/Amino Acids/Dextrose/ Fat Emulsion Intravenous 1,920 ml @  

80 mls/hr TPN  CONT IV  Last administered on 5/12/20at 21:40;  Start 5/12/20 at 

22:00;  Stop 5/13/20 at 21:59;  Status DC


Lidocaine HCl (Buffered Lidocaine 1%) 6 ml 1X  ONCE INJ  Last administered on 

5/12/20at 14:15;  Start 5/12/20 at 14:15;  Stop 5/12/20 at 14:16;  Status DC


Potassium Chloride 80 meq/ Magnesium Sulfate 20 meq/ Multivitamins 10 ml/

mium/ Copper/Manganese/ Seleni/Zn 1 ml/ Insulin Human Regular 15 unit/ Total 

Parenteral Nutrition/Amino Acids/Dextrose/ Fat Emulsion Intravenous 1,920 ml @  

80 mls/hr TPN  CONT IV  Last administered on 5/13/20at 22:04;  Start 5/13/20 at 

22:00;  Stop 5/14/20 at 21:59;  Status DC


Potassium Chloride/Water 100 ml @  100 mls/hr 1X  ONCE IV  Last administered on 

5/14/20at 11:34;  Start 5/14/20 at 11:00;  Stop 5/14/20 at 11:59;  Status DC


Potassium Chloride 90 meq/ Magnesium Sulfate 20 meq/ Multivitamins 10 ml/Chr

omium/ Copper/Manganese/ Seleni/Zn 1 ml/ Insulin Human Regular 15 unit/ Total 

Parenteral Nutrition/Amino Acids/Dextrose/ Fat Emulsion Intravenous 1,920 ml @  

80 mls/hr TPN  CONT IV  Last administered on 5/14/20at 22:57;  Start 5/14/20 at 

22:00;  Stop 5/15/20 at 21:59;  Status DC


Potassium Chloride 90 meq/ Magnesium Sulfate 20 meq/ Multivitamins 10 

ml/Chromium/ Copper/Manganese/ Seleni/Zn 1 ml/ Insulin Human Regular 15 unit/ 

Total Parenteral Nutrition/Amino Acids/Dextrose/ Fat Emulsion Intravenous 1,920 

ml @  80 mls/hr TPN  CONT IV  Last administered on 5/15/20at 22:48;  Start 

5/15/20 at 22:00;  Stop 5/16/20 at 21:59;  Status DC


Potassium Chloride 90 meq/ Magnesium Sulfate 20 meq/ Multivitamins 10 

ml/Chromium/ Copper/Manganese/ Seleni/Zn 1 ml/ Insulin Human Regular 15 unit/ 

Total Parenteral Nutrition/Amino Acids/Dextrose/ Fat Emulsion Intravenous 1,890 

ml @  78.75 mls/ hr TPN  CONT IV  Last administered on 5/16/20at 22:15;  Start 

5/16/20 at 22:00;  Stop 5/17/20 at 21:59;  Status DC


Linezolid/Dextrose 300 ml @  300 mls/hr Q12HR IV  Last administered on 5/19/20at

21:08;  Start 5/17/20 at 09:00;  Stop 5/20/20 at 08:11;  Status DC


Daptomycin 450 mg/ Sodium Chloride 50 ml @  100 mls/hr Q24H IV  Last 

administered on 5/20/20at 09:25;  Start 5/17/20 at 09:00;  Stop 5/21/20 at 

08:30;  Status DC


Potassium Chloride 90 meq/ Magnesium Sulfate 20 meq/ Multivitamins 10 

ml/Chromium/ Copper/Manganese/ Seleni/Zn 1 ml/ Insulin Human Regular 15 unit/ 

Total Parenteral Nutrition/Amino Acids/Dextrose/ Fat Emulsion Intravenous 1,890 

ml @  78.75 mls/ hr TPN  CONT IV  Last administered on 5/17/20at 21:34;  Start 

5/17/20 at 22:00;  Stop 5/18/20 at 21:59;  Status DC


Lorazepam (Ativan Inj) 2 mg STK-MED ONCE .ROUTE ;  Start 5/17/20 at 14:58;  Stop

5/17/20 at 14:58;  Status DC


Metoprolol Tartrate (Lopressor Vial) 5 mg 1X  ONCE IVP  Last administered on 

5/17/20at 15:31;  Start 5/17/20 at 15:15;  Stop 5/17/20 at 15:16;  Status DC


Lorazepam (Ativan Inj) 2 mg 1X  ONCE IVP  Last administered on 5/17/20at 15:30; 

Start 5/17/20 at 15:15;  Stop 5/17/20 at 15:16;  Status DC


Enoxaparin Sodium (Lovenox 40mg Syringe) 40 mg Q24H SQ  Last administered on 

6/5/20at 17:44;  Start 5/17/20 at 17:00;  Stop 6/7/20 at 06:50;  Status DC


Lorazepam (Ativan Inj) 1 mg PRN Q4HRS  PRN IVP ANXIETY / AGITATION MILD-MOD Last

administered on 5/31/20at 15:55;  Start 5/17/20 at 19:15;  Stop 6/2/20 at 11:45;

 Status DC


Lorazepam (Ativan Inj) 2 mg PRN Q4HRS  PRN IVP ANXIETY / AGITATION SEVERE Last 

administered on 6/1/20at 07:55;  Start 5/17/20 at 19:15;  Stop 6/2/20 at 11:45; 

Status DC


Fentanyl Citrate (Fentanyl 2ml Vial) 50 mcg PRN Q4HRS  PRN IVP SEVERE PAIN Last 

administered on 6/13/20at 05:15;  Start 5/18/20 at 13:15;  Stop 6/14/20 at 

09:29;  Status DC


Fentanyl Citrate (Fentanyl 2ml Vial) 25 mcg PRN Q4HRS  PRN IVP MODERATE PAIN L

ast administered on 6/13/20at 00:27;  Start 5/18/20 at 13:15;  Stop 6/14/20 at 

09:30;  Status DC


Potassium Chloride 90 meq/ Magnesium Sulfate 20 meq/ Multivitamins 10 

ml/Chromium/ Copper/Manganese/ Seleni/Zn 1 ml/ Insulin Human Regular 15 unit/ 

Total Parenteral Nutrition/Amino Acids/Dextrose/ Fat Emulsion Intravenous 1,890 

ml @  78.75 mls/ hr TPN  CONT IV  Last administered on 5/18/20at 22:18;  Start 

5/18/20 at 22:00;  Stop 5/19/20 at 21:59;  Status DC


Furosemide (Lasix) 40 mg 1X  ONCE IVP  Last administered on 5/18/20at 21:51;  

Start 5/18/20 at 21:45;  Stop 5/18/20 at 21:48;  Status DC


Albumin Human 100 ml @  100 mls/hr 1X PRN  PRN IV SEE COMMENTS;  Start 5/19/20 

at 01:30;  Stop 8/3/20 at 09:52;  Status DC


Furosemide (Lasix) 40 mg [id number] IVP  Last administered on 6/3/20at 08:04;  Start 

5/19/20 at 14:00;  Stop 6/3/20 at 13:07;  Status DC


Potassium Chloride 90 meq/ Magnesium Sulfate 20 meq/ Multivitamins 10 

ml/Chromium/ Copper/Manganese/ Seleni/Zn 1 ml/ Insulin Human Regular 15 unit/ 

Total Parenteral Nutrition/Amino Acids/Dextrose/ Fat Emulsion Intravenous 1,800 

ml @  75 mls/hr TPN  CONT IV  Last administered on 5/19/20at 22:31;  Start 

5/19/20 at 22:00;  Stop 5/20/20 at 21:59;  Status DC


Potassium Chloride 90 meq/ Magnesium Sulfate 20 meq/ Multivitamins 10 ml/Ch

romium/ Copper/Manganese/ Seleni/Zn 1 ml/ Insulin Human Regular 15 unit/ Total 

Parenteral Nutrition/Amino Acids/Dextrose/ Fat Emulsion Intravenous 1,800 ml @  

75 mls/hr TPN  CONT IV  Last administered on 5/20/20at 22:28;  Start 5/20/20 at 

22:00;  Stop 5/21/20 at 21:59;  Status DC


Potassium Chloride 110 meq/ Magnesium Sulfate 20 meq/ Multivitamins 10 

ml/Chromium/ Copper/Manganese/ Seleni/Zn 1 ml/ Insulin Human Regular 15 unit/ 

Total Parenteral Nutrition/Amino Acids/Dextrose/ Fat Emulsion Intravenous 1,800 

ml @  75 mls/hr TPN  CONT IV  Last administered on 5/21/20at 22:01;  Start 

5/21/20 at 22:00;  Stop 5/22/20 at 21:59;  Status DC


Saliva Substitute (Biotene Moisturizing Mouth) 2 spray PRN Q15MIN  PRN PO DRY 

MOUTH;  Start 5/21/20 at 11:00


Potassium Chloride 110 meq/ Magnesium Sulfate 20 meq/ Multivitamins 10 

ml/Chromium/ Copper/Manganese/ Seleni/Zn 1 ml/ Insulin Human Regular 15 unit/ 

Total Parenteral Nutrition/Amino Acids/Dextrose/ Fat Emulsion Intravenous 1,800 

ml @  75 mls/hr TPN  CONT IV  Last administered on 5/22/20at 22:21;  Start 

5/22/20 at 22:00;  Stop 5/23/20 at 21:59;  Status DC


Potassium Chloride 110 meq/ Magnesium Sulfate 20 meq/ Multivitamins 10 

ml/Chromium/ Copper/Manganese/ Seleni/Zn 1 ml/ Insulin Human Regular 15 unit/ 

Total Parenteral Nutrition/Amino Acids/Dextrose/ Fat Emulsion Intravenous 1,800 

ml @  75 mls/hr TPN  CONT IV  Last administered on 5/23/20at 22:04;  Start 

5/23/20 at 22:00;  Stop 5/24/20 at 21:59;  Status DC


Potassium Chloride 110 meq/ Magnesium Sulfate 20 meq/ Multivitamins 10 ml/C

hromium/ Copper/Manganese/ Seleni/Zn 1 ml/ Insulin Human Regular 15 unit/ Total 

Parenteral Nutrition/Amino Acids/Dextrose/ Fat Emulsion Intravenous 1,800 ml @  

75 mls/hr TPN  CONT IV  Last administered on 5/24/20at 22:48;  Start 5/24/20 at 

22:00;  Stop 5/25/20 at 21:59;  Status DC


Potassium Chloride 70 meq/ Magnesium Sulfate 20 meq/ Multivitamins 10 

ml/Chromium/ Copper/Manganese/ Seleni/Zn 1 ml/ Insulin Human Regular 15 unit/ 

Total Parenteral Nutrition/Amino Acids/Dextrose/ Fat Emulsion Intravenous 1,800 

ml @  75 mls/hr TPN  CONT IV  Last administered on 5/25/20at 21:39;  Start 

5/25/20 at 22:00;  Stop 5/26/20 at 21:59;  Status DC


Meropenem 500 mg/ Sodium Chloride 50 ml @  100 mls/hr Q6HRS IV  Last 

administered on 5/27/20at 06:02;  Start 5/25/20 at 18:00;  Stop 5/27/20 at 

09:59;  Status DC


Barium Sulfate (Varibar Thin Liquid Apple) 148 gm 1X  ONCE PO ;  Start 5/26/20 

at 11:45;  Stop 5/26/20 at 11:49;  Status DC


Potassium Chloride 70 meq/ Magnesium Sulfate 20 meq/ Multivitamins 10 

ml/Chromium/ Copper/Manganese/ Seleni/Zn 1 ml/ Insulin Human Regular 15 unit/ 

Total Parenteral Nutrition/Amino Acids/Dextrose/ Fat Emulsion Intravenous 1,800 

ml @  75 mls/hr TPN  CONT IV  Last administered on 5/26/20at 22:27;  Start 

5/26/20 at 22:00;  Stop 5/27/20 at 21:59;  Status DC


Piperacillin Sod/ Tazobactam Sod 3.375 gm/Sodium Chloride 50 ml @  100 mls/hr 

Q6HRS IV  Last administered on 6/4/20at 06:10;  Start 5/27/20 at 12:00;  Stop 

6/4/20 at 07:26;  Status DC


Potassium Chloride 70 meq/ Magnesium Sulfate 20 meq/ Multivitamins 10 ml

/Chromium/ Copper/Manganese/ Seleni/Zn 1 ml/ Insulin Human Regular 15 unit/ 

Total Parenteral Nutrition/Amino Acids/Dextrose/ Fat Emulsion Intravenous 1,800 

ml @  75 mls/hr TPN  CONT IV  Last administered on 5/27/20at 22:03;  Start 

5/27/20 at 22:00;  Stop 5/28/20 at 21:59;  Status DC


Potassium Chloride 70 meq/ Magnesium Sulfate 20 meq/ Multivitamins 10 

ml/Chromium/ Copper/Manganese/ Seleni/Zn 1 ml/ Insulin Human Regular 15 unit/ 

Total Parenteral Nutrition/Amino Acids/Dextrose/ Fat Emulsion Intravenous 1,800 

ml @  75 mls/hr TPN  CONT IV  Last administered on 5/28/20at 22:33;  Start 

5/28/20 at 22:00;  Stop 5/29/20 at 21:59;  Status DC


Potassium Chloride 70 meq/ Magnesium Sulfate 20 meq/ Multivitamins 10 

ml/Chromium/ Copper/Manganese/ Seleni/Zn 1 ml/ Insulin Human Regular 15 unit/ 

Total Parenteral Nutrition/Amino Acids/Dextrose/ Fat Emulsion Intravenous 1,800 

ml @  75 mls/hr TPN  CONT IV  Last administered on 5/29/20at 23:13;  Start 

5/29/20 at 22:00;  Stop 5/30/20 at 21:59;  Status DC


Potassium Chloride 80 meq/ Magnesium Sulfate 20 meq/ Multivitamins 10 

ml/Chromium/ Copper/Manganese/ Seleni/Zn 1 ml/ Insulin Human Regular 15 unit/ 

Total Parenteral Nutrition/Amino Acids/Dextrose/ Fat Emulsion Intravenous 1,800 

ml @  75 mls/hr TPN  CONT IV  Last administered on 5/30/20at 22:30;  Start 

5/30/20 at 22:00;  Stop 5/31/20 at 21:59;  Status DC


Potassium Chloride 80 meq/ Magnesium Sulfate 20 meq/ Multivitamins 10 

ml/Chromium/ Copper/Manganese/ Seleni/Zn 1 ml/ Insulin Human Regular 15 unit/ 

Total Parenteral Nutrition/Amino Acids/Dextrose/ Fat Emulsion Intravenous 1,800 

ml @  75 mls/hr TPN  CONT IV  Last administered on 5/31/20at 21:54;  Start 

5/31/20 at 22:00;  Stop 6/1/20 at 21:59;  Status DC


Potassium Chloride/Water 100 ml @  100 mls/hr 1X  ONCE IV  Last administered on 

6/1/20at 10:15;  Start 6/1/20 at 10:00;  Stop 6/1/20 at 10:59;  Status DC


Potassium Chloride 90 meq/ Magnesium Sulfate 20 meq/ Multivitamins 10 

ml/Chromium/ Copper/Manganese/ Seleni/Zn 1 ml/ Insulin Human Regular 20 unit/ 

Total Parenteral Nutrition/Amino Acids/Dextrose/ Fat Emulsion Intravenous 1,800 

ml @  75 mls/hr TPN  CONT IV  Last administered on 6/1/20at 22:28;  Start 6/1/20

at 22:00;  Stop 6/2/20 at 21:59;  Status DC


Potassium Chloride 90 meq/ Magnesium Sulfate 20 meq/ Multivitamins 10 

ml/Chromium/ Copper/Manganese/ Seleni/Zn 1 ml/ Insulin Human Regular 20 unit/ 

Total Parenteral Nutrition/Amino Acids/Dextrose/ Fat Emulsion Intravenous 1,800 

ml @  75 mls/hr TPN  CONT IV  Last administered on 6/2/20at 22:08;  Start 6/2/20

at 22:00;  Stop 6/3/20 at 21:59;  Status DC


Lorazepam (Ativan Inj) 0.25 mg PRN Q4HRS  PRN IVP ANXIETY / AGITATION Last 

administered on 8/13/20at 15:56;  Start 6/3/20 at 07:30


Potassium Chloride 90 meq/ Magnesium Sulfate 20 meq/ Multivitamins 10 

ml/Chromium/ Copper/Manganese/ Seleni/Zn 1 ml/ Insulin Human Regular 20 unit/ 

Total Parenteral Nutrition/Amino Acids/Dextrose/ Fat Emulsion Intravenous 1,800 

ml @  75 mls/hr TPN  CONT IV  Last administered on 6/3/20at 23:13;  Start 6/3/20

at 22:00;  Stop 6/4/20 at 21:59;  Status DC


Furosemide (Lasix) 40 mg DAILY IVP  Last administered on 6/5/20at 11:14;  Start 

6/3/20 at 13:30;  Stop 6/7/20 at 09:12;  Status DC


Fluoxetine HCl (PROzac) 20 mg QHS PEG  Last administered on 8/16/20at 21:02;  

Start 6/4/20 at 21:00;  Stop 8/17/20 at 17:07;  Status DC


Fentanyl (Duragesic 50mcg/ Hr Patch) 1 patch Q72H TD  Last administered on 

6/4/20at 21:22;  Start 6/4/20 at 21:00;  Stop 6/13/20 at 12:00;  Status DC


Potassium Chloride 40 meq/ Potassium Acetate 60 meq/Magnesium Sulfate 10 meq/ 

Multivitamins 10 ml/Chromium/ Copper/Manganese/ Seleni/Zn 1 ml/ Insulin Human 

Regular 20 unit/ Total Parenteral Nutrition/Amino Acids/Dextrose/ Fat Emulsion 

Intravenous 1,800 ml @  75 mls/hr TPN  CONT IV  Last administered on 6/5/20at 

00:03;  Start 6/4/20 at 22:00;  Stop 6/5/20 at 21:59;  Status DC


Potassium Acetate 80 meq/Magnesium Sulfate 5 meq/ Multivitamins 10 ml/Chromium/ 

Copper/Manganese/ Seleni/Zn 1 ml/ Insulin Human Regular 20 unit/ Total 

Parenteral Nutrition/Amino Acids/Dextrose/ Fat Emulsion Intravenous 1,920 ml @  

80 mls/hr TPN  CONT IV  Last administered on 6/5/20at 21:59;  Start 6/5/20 at 

22:00;  Stop 6/6/20 at 21:59;  Status DC


Potassium Acetate 60 meq/Magnesium Sulfate 5 meq/ Multivitamins 10 ml/Chromium/ 

Copper/Manganese/ Seleni/Zn 1 ml/ Insulin Human Regular 30 unit/ Total Paren

teral Nutrition/Amino Acids/Dextrose/ Fat Emulsion Intravenous 1,920 ml @  80 

mls/hr TPN  CONT IV  Last administered on 6/6/20at 21:54;  Start 6/6/20 at 

22:00;  Stop 6/7/20 at 21:59;  Status DC


Norepinephrine Bitartrate 8 mg/ Dextrose 258 ml @  13.332 mls/ hr CONT  PRN IV 

PER PROTOCOL Last administered on 7/2/20at 09:09;  Start 6/7/20 at 06:30;  Stop 

8/3/20 at 09:45;  Status DC


Albumin Human 500 ml @  125 mls/hr 1X  ONCE IV  Last administered on 6/7/20at 

08:10;  Start 6/7/20 at 08:15;  Stop 6/7/20 at 12:14;  Status DC


Potassium Acetate 40 meq/Magnesium Sulfate 5 meq/ Multivitamins 10 ml/Chromium/ 

Copper/Manganese/ Seleni/Zn 1 ml/ Insulin Human Regular 30 unit/ Total 

Parenteral Nutrition/Amino Acids/Dextrose/ Fat Emulsion Intravenous 1,920 ml @  

80 mls/hr TPN  CONT IV  Last administered on 6/7/20at 22:23;  Start 6/7/20 at 

22:00;  Stop 6/8/20 at 21:59;  Status DC


Meropenem 1 gm/ Sodium Chloride 100 ml @  200 mls/hr Q8HRS IV ;  Start 6/7/20 at

14:00;  Status Cancel


Meropenem 1 gm/ Sodium Chloride 100 ml @  200 mls/hr Q8HRS IV  Last administered

on 6/7/20at 11:04;  Start 6/7/20 at 10:00;  Stop 6/7/20 at 13:00;  Status DC


Meropenem 1 gm/ Sodium Chloride 100 ml @  200 mls/hr Q12HR IV  Last administered

on 6/25/20at 08:27;  Start 6/7/20 at 21:00;  Stop 6/25/20 at 08:56;  Status DC


Sodium Chloride 1,000 ml @  1,000 mls/hr 1X  ONCE IV  Last administered on 

6/7/20at 11:06;  Start 6/7/20 at 10:45;  Stop 6/7/20 at 11:44;  Status DC


Micafungin Sodium 100 mg/Dextrose 100 ml @  100 mls/hr Q24H IV  Last 

administered on 6/24/20at 12:34;  Start 6/7/20 at 11:00;  Stop 6/25/20 at 08:56;

 Status DC


Daptomycin 410 mg/ Sodium Chloride 50 ml @  100 mls/hr Q24H IV  Last 

administered on 6/9/20at 13:33;  Start 6/7/20 at 14:00;  Stop 6/10/20 at 08:30; 

Status DC


Midazolam HCl (Versed) 2 mg STK-MED ONCE .ROUTE ;  Start 6/7/20 at 14:47;  Stop 

6/7/20 at 14:48;  Status DC


Fentanyl Citrate (Fentanyl 2ml Vial) 100 mcg STK-MED ONCE .ROUTE ;  Start 6/7/20

at 14:47;  Stop 6/7/20 at 14:48;  Status DC


Flumazenil (Romazicon) 0.5 mg STK-MED ONCE IV ;  Start 6/7/20 at 14:48;  Stop 

6/7/20 at 14:48;  Status DC


Naloxone HCl (Narcan) 0.4 mg STK-MED ONCE .ROUTE ;  Start 6/7/20 at 14:48;  Stop

6/7/20 at 14:48;  Status DC


Lidocaine HCl (Lidocaine 1% 20ml Vial) 20 ml STK-MED ONCE .ROUTE ;  Start 6/7/20

at 14:48;  Stop 6/7/20 at 14:48;  Status DC


Midazolam HCl (Versed) 2 mg 1X  ONCE IV  Last administered on 6/7/20at 15:28;  

Start 6/7/20 at 15:00;  Stop 6/7/20 at 15:01;  Status DC


Fentanyl Citrate (Fentanyl 2ml Vial) 100 mcg 1X  ONCE IV  Last administered on 

6/7/20at 15:28;  Start 6/7/20 at 15:00;  Stop 6/7/20 at 15:01;  Status DC


Lidocaine HCl (Lidocaine 1% 20ml Vial) 20 ml 1X  ONCE INJ  Last administered on 

6/7/20at 15:30;  Start 6/7/20 at 15:00;  Stop 6/7/20 at 15:01;  Status DC


Sodium Chloride 1,000 ml @  100 mls/hr Q10H IV  Last administered on 6/16/20at 

07:30;  Start 6/7/20 at 20:00;  Stop 6/16/20 at 11:26;  Status DC


Sodium Bicarbonate (Sodium Bicarb Adult 8.4% Syr) 50 meq 1X  ONCE IV  Last 

administered on 6/7/20at 21:47;  Start 6/7/20 at 22:00;  Stop 6/7/20 at 22:01;  

Status DC


Potassium Acetate 40 meq/Magnesium Sulfate 5 meq/ Multivitamins 10 ml/Chromium/ 

Copper/Manganese/ Seleni/Zn 1 ml/ Insulin Human Regular 30 unit/ Total 

Parenteral Nutrition/Amino Acids/Dextrose/ Fat Emulsion Intravenous 1,920 ml @  

80 mls/hr TPN  CONT IV  Last administered on 6/8/20at 22:28;  Start 6/8/20 at 

22:00;  Stop 6/9/20 at 21:59;  Status DC


Sodium Chloride 500 ml @  500 mls/hr 1X  ONCE IV  Last administered on 6/9/20at 

06:39;  Start 6/9/20 at 06:45;  Stop 6/9/20 at 07:44;  Status DC


Potassium Acetate 40 meq/Magnesium Sulfate 5 meq/ Multivitamins 10 ml/Chromium/ 

Copper/Manganese/ Seleni/Zn 1 ml/ Insulin Human Regular 30 unit/ Total 

Parenteral Nutrition/Amino Acids/Dextrose/ Fat Emulsion Intravenous 1,920 ml @  

80 mls/hr TPN  CONT IV  Last administered on 6/9/20at 22:03;  Start 6/9/20 at 

22:00;  Stop 6/10/20 at 21:59;  Status DC


Metoprolol Tartrate (Lopressor Vial) 5 mg PRN Q6HRS  PRN IVP HYPERTENSION Last 

administered on 8/11/20at 10:02;  Start 6/10/20 at 09:00


Potassium Acetate 40 meq/Magnesium Sulfate 5 meq/ Multivitamins 10 ml/Chromium/ 

Copper/Manganese/ Seleni/Zn 1 ml/ Insulin Human Regular 30 unit/ Total 

Parenteral Nutrition/Amino Acids/Dextrose/ Fat Emulsion Intravenous 1,920 ml @  

80 mls/hr TPN  CONT IV  Last administered on 6/10/20at 21:26;  Start 6/10/20 at 

22:00;  Stop 6/11/20 at 21:59;  Status DC


Potassium Acetate 40 meq/Magnesium Sulfate 5 meq/ Multivitamins 10 ml/Chromium/ 

Copper/Manganese/ Seleni/Zn 1 ml/ Insulin Human Regular 30 unit/ Total 

Parenteral Nutrition/Amino Acids/Dextrose/ Fat Emulsion Intravenous 1,920 ml @  

80 mls/hr TPN  CONT IV  Last administered on 6/11/20at 23:23;  Start 6/11/20 at 

22:00;  Stop 6/12/20 at 21:59;  Status DC


Potassium Acetate 40 meq/Magnesium Sulfate 5 meq/ Multivitamins 10 ml/Chromium/ 

Copper/Manganese/ Seleni/Zn 1 ml/ Insulin Human Regular 30 unit/ Total 

Parenteral Nutrition/Amino Acids/Dextrose/ Fat Emulsion Intravenous 1,920 ml @  

80 mls/hr TPN  CONT IV  Last administered on 6/12/20at 21:35;  Start 6/12/20 at 

22:00;  Stop 6/13/20 at 21:59;  Status DC


Furosemide (Lasix) 20 mg 1X  ONCE IVP  Last administered on 6/13/20at 06:26;  

Start 6/13/20 at 06:15;  Stop 6/13/20 at 06:16;  Status DC


Methylprednisolone Sodium Succinate (SOLU-Medrol 125MG VIAL) 125 mg 1X  ONCE IV 

Last administered on 6/13/20at 06:26;  Start 6/13/20 at 06:15;  Stop 6/13/20 at 

06:16;  Status DC


Albuterol/ Ipratropium (Duoneb) 3 ml Q4HRS NEB  Last administered on 8/17/20at 

12:21;  Start 6/13/20 at 08:00


Fentanyl Citrate 30 ml @ 0 mls/hr CONT  PRN IV SEE PROTOCOL Last administered on

7/4/20at 08:03;  Start 6/13/20 at 06:00;  Stop 7/4/20 at 12:42;  Status DC


Propofol 100 ml @ 0 mls/hr CONT  PRN IV SEE PROTOCOL Last administered on 

6/20/20at 23:50;  Start 6/13/20 at 06:00;  Stop 8/3/20 at 09:45;  Status DC


Fentanyl Citrate (Fentanyl 2ml Vial) 25 mcg PRN Q1HR  PRN IV SEE COMMENTS Last 

administered on 8/1/20at 23:56;  Start 6/13/20 at 06:00;  Stop 8/3/20 at 09:45; 

Status DC


Fentanyl Citrate (Fentanyl 2ml Vial) 50 mcg PRN Q1HR  PRN IV SEE COMMENTS Last 

administered on 8/3/20at 09:39;  Start 6/13/20 at 06:00;  Stop 8/3/20 at 09:45; 

Status DC


Chlorhexidine Gluconate (Peridex) 15 ml BID MM ;  Start 6/13/20 at 09:00;  Stop 

6/13/20 at 07:58;  Status DC


Potassium Acetate 40 meq/Magnesium Sulfate 5 meq/ Multivitamins 10 ml/Chromium/ 

Copper/Manganese/ Seleni/Zn 1 ml/ Insulin Human Regular 30 unit/ Total 

Parenteral Nutrition/Amino Acids/Dextrose/ Fat Emulsion Intravenous 1,920 ml @  

80 mls/hr TPN  CONT IV  Last administered on 6/13/20at 21:19;  Start 6/13/20 at 

22:00;  Stop 6/14/20 at 21:59;  Status DC


Acetylcysteine (Mucomyst 20% Resp Treatment) 600 mg BID NEB  Last administered 

on 6/19/20at 09:33;  Start 6/13/20 at 21:00;  Stop 6/19/20 at 10:39;  Status DC


Magnesium Sulfate 100 ml @  25 mls/hr 1X  ONCE IV  Last administered on 

6/13/20at 15:48;  Start 6/13/20 at 15:45;  Stop 6/13/20 at 19:44;  Status DC


Potassium Acetate 40 meq/Magnesium Sulfate 5 meq/ Multivitamins 10 ml/Chromium/ 

Copper/Manganese/ Seleni/Zn 1 ml/ Insulin Human Regular 30 unit/ Total 

Parenteral Nutrition/Amino Acids/Dextrose/ Fat Emulsion Intravenous 1,920 ml @  

80 mls/hr TPN  CONT IV  Last administered on 6/14/20at 21:35;  Start 6/14/20 at 

22:00;  Stop 6/15/20 at 21:59;  Status DC


Potassium Chloride/Water 100 ml @  100 mls/hr Q1H IV  Last administered on 

6/15/20at 08:31;  Start 6/15/20 at 07:00;  Stop 6/15/20 at 08:59;  Status DC


Potassium Acetate 40 meq/Magnesium Sulfate 5 meq/ Multivitamins 10 ml/Chromium/ 

Copper/Manganese/ Seleni/Zn 1 ml/ Insulin Human Regular 30 unit/ Total 

Parenteral Nutrition/Amino Acids/Dextrose/ Fat Emulsion Intravenous 1,920 ml @  

80 mls/hr TPN  CONT IV  Last administered on 6/15/20at 21:54;  Start 6/15/20 at 

22:00;  Stop 6/16/20 at 19:34;  Status DC


Lidocaine HCl (Buffered Lidocaine 1%) 3 ml STK-MED ONCE .ROUTE ;  Start 6/15/20 

at 12:14;  Stop 6/15/20 at 12:14;  Status DC


Lidocaine HCl (Buffered Lidocaine 1%) 3 ml 1X  ONCE IJ  Last administered on 

6/15/20at 13:11;  Start 6/15/20 at 13:00;  Stop 6/15/20 at 13:01;  Status DC


Magnesium Sulfate 50 ml @ 25 mls/hr 1X  ONCE IV ;  Start 6/16/20 at 08:15;  Stop

6/16/20 at 10:14;  Status DC


Potassium Acetate 40 meq/Magnesium Sulfate 10 meq/ Multivitamins 10 ml/Chromium/

Copper/Manganese/ Seleni/Zn 1 ml/ Insulin Human Regular 20 unit/ Total 

Parenteral Nutrition/Amino Acids/Dextrose/ Fat Emulsion Intravenous 1,920 ml @  

80 mls/hr TPN  CONT IV  Last administered on 6/16/20at 21:32;  Start 6/16/20 at 

22:00;  Stop 6/17/20 at 21:59;  Status DC


Potassium Chloride/Water 100 ml @  100 mls/hr Q1H IV  Last administered on 

6/17/20at 09:12;  Start 6/17/20 at 08:00;  Stop 6/17/20 at 09:59;  Status DC


Alteplase, Recombinant (Cathflo For Central Catheter Clearance) 4 mg 1X  ONCE 

INT CAT ;  Start 6/17/20 at 09:15;  Stop 6/17/20 at 09:16;  Status UNV


Alteplase, Recombinant (Cathflo For Central Catheter Clearance) 4 mg 1X  ONCE 

INT CAT ;  Start 6/17/20 at 09:15;  Stop 6/17/20 at 09:16;  Status UNV


Alteplase, Recombinant (Cathflo For Central Catheter Clearance) 4 mg 1X  ONCE 

INT CAT ;  Start 6/17/20 at 09:15;  Stop 6/17/20 at 09:16;  Status UNV


Alteplase, Recombinant 4 mg/ Sodium Chloride 20 ml @ 20 mls/hr 1X  ONCE IV  Last

administered on 6/17/20at 10:10;  Start 6/17/20 at 10:00;  Stop 6/17/20 at 

10:59;  Status DC


Alteplase, Recombinant 4 mg/ Sodium Chloride 20 ml @ 20 mls/hr 1X  ONCE IV  Last

administered on 6/17/20at 10:09;  Start 6/17/20 at 10:00;  Stop 6/17/20 at 

10:59;  Status DC


Alteplase, Recombinant 4 mg/ Sodium Chloride 20 ml @ 20 mls/hr 1X  ONCE IV  Last

administered on 6/17/20at 10:09;  Start 6/17/20 at 10:00;  Stop 6/17/20 at 

10:59;  Status DC


Potassium Acetate 60 meq/Magnesium Sulfate 10 meq/ Multivitamins 10 ml/Chromium/

Copper/Manganese/ Seleni/Zn 1 ml/ Insulin Human Regular 20 unit/ Total Parenter

al Nutrition/Amino Acids/Dextrose/ Fat Emulsion Intravenous 1,920 ml @  80 

mls/hr TPN  CONT IV  Last administered on 6/17/20at 21:55;  Start 6/17/20 at 

22:00;  Stop 6/18/20 at 21:59;  Status DC


Albumin Human 500 ml @  125 mls/hr 1X  ONCE IV  Last administered on 6/18/20at 

12:01;  Start 6/18/20 at 11:15;  Stop 6/18/20 at 15:14;  Status DC


Sodium Chloride 500 ml @  500 mls/hr 1X  ONCE IV  Last administered on 6/18/20at

13:50;  Start 6/18/20 at 11:15;  Stop 6/18/20 at 12:14;  Status DC


Potassium Acetate 60 meq/Magnesium Sulfate 14 meq/ Multivitamins 10 ml/Chromium/

Copper/Manganese/ Seleni/Zn 1 ml/ Insulin Human Regular 20 unit/ Total 

Parenteral Nutrition/Amino Acids/Dextrose/ Fat Emulsion Intravenous 1,920 ml @  

80 mls/hr TPN  CONT IV  Last administered on 6/18/20at 22:26;  Start 6/18/20 at 

22:00;  Stop 6/19/20 at 21:59;  Status DC


Ciprofloxacin/ Dextrose 200 ml @  200 mls/hr Q12HR IV  Last administered on 

6/25/20at 08:27;  Start 6/18/20 at 21:00;  Stop 6/25/20 at 08:56;  Status DC


Albumin Human 250 ml @  62.5 mls/hr 1X  ONCE IV  Last administered on 6/19/20at 

11:09;  Start 6/19/20 at 11:00;  Stop 6/19/20 at 14:59;  Status DC


Furosemide (Lasix) 20 mg 1X  ONCE IVP  Last administered on 6/19/20at 14:52;  

Start 6/19/20 at 10:45;  Stop 6/19/20 at 10:49;  Status DC


Potassium Acetate 60 meq/Magnesium Sulfate 14 meq/ Multivitamins 10 ml/Chromium/

Copper/Manganese/ Seleni/Zn 1 ml/ Insulin Human Regular 15 unit/ Total 

Parenteral Nutrition/Amino Acids/Dextrose/ Fat Emulsion Intravenous 1,920 ml @  

80 mls/hr TPN  CONT IV  Last administered on 6/19/20at 22:08;  Start 6/19/20 at 

22:00;  Stop 6/20/20 at 21:59;  Status DC


Potassium Acetate 60 meq/Magnesium Sulfate 14 meq/ Multivitamins 10 ml/Chromium/

Copper/Manganese/ Seleni/Zn 1 ml/ Insulin Human Regular 15 unit/ Total Lisa

ral Nutrition/Amino Acids/Dextrose/ Fat Emulsion Intravenous 1,920 ml @  80 

mls/hr TPN  CONT IV  Last administered on 6/20/20at 22:12;  Start 6/20/20 at 

22:00;  Stop 6/21/20 at 21:59;  Status DC


Potassium Acetate 60 meq/Magnesium Sulfate 14 meq/ Multivitamins 10 ml/Chromium/

Copper/Manganese/ Seleni/Zn 1 ml/ Insulin Human Regular 15 unit/ Total 

Parenteral Nutrition/Amino Acids/Dextrose/ Fat Emulsion Intravenous 1,920 ml @  

80 mls/hr TPN  CONT IV  Last administered on 6/21/20at 22:22;  Start 6/21/20 at 

22:00;  Stop 6/22/20 at 21:59;  Status DC


Furosemide (Lasix) 20 mg 1X  ONCE IVP  Last administered on 6/22/20at 11:07;  

Start 6/22/20 at 10:30;  Stop 6/22/20 at 10:34;  Status DC


Potassium Acetate 60 meq/Magnesium Sulfate 14 meq/ Multivitamins 10 ml/Chromium/

Copper/Manganese/ Seleni/Zn 1 ml/ Insulin Human Regular 15 unit/ Sodium Chloride

20 meq/Total Parenteral Nutrition/Amino Acids/Dextrose/ Fat Emulsion Intravenous

1,920 ml @  80 mls/hr TPN  CONT IV  Last administered on 6/22/20at 21:54;  Start

6/22/20 at 22:00;  Stop 6/23/20 at 21:59;  Status DC


Potassium Acetate 30 meq/Magnesium Sulfate 14 meq/ Multivitamins 10 ml/Chromium/

Copper/Manganese/ Seleni/Zn 1 ml/ Insulin Human Regular 15 unit/ Sodium Chloride

20 meq/Potassium Chloride 30 meq/ Total Parenteral Nutrition/Amino 

Acids/Dextrose/ Fat Emulsion Intravenous 1,920 ml @  80 mls/hr TPN  CONT IV  

Last administered on 6/23/20at 21:46;  Start 6/23/20 at 22:00;  Stop 6/24/20 at 

21:59;  Status DC


Sodium Chloride 80 meq/Potassium Chloride 30 meq/ Potassium Acetate 30 

meq/Magnesium Sulfate 14 meq/ Multivitamins 10 ml/Chromium/ Copper/Manganese/ 

Seleni/Zn 1 ml/ Insulin Human Regular 15 unit/ Total Parenteral Nutrition/Amino 

Acids/Dextrose/ Fat Emulsion Intravenous 1,920 ml @  80 mls/hr TPN  CONT IV  

Last administered on 6/24/20at 22:33;  Start 6/24/20 at 22:00;  Stop 6/25/20 at 

21:59;  Status DC


Furosemide (Lasix) 40 mg 1X  ONCE IVP  Last administered on 6/24/20at 16:27;  

Start 6/24/20 at 15:30;  Stop 6/24/20 at 15:33;  Status DC


Albumin Human 250 ml @  62.5 mls/hr 1X  ONCE IV  Last administered on 6/24/20at 

16:27;  Start 6/24/20 at 15:30;  Stop 6/24/20 at 19:29;  Status DC


Sodium Chloride 80 meq/Potassium Chloride 30 meq/ Potassium Acetate 30 

meq/Magnesium Sulfate 14 meq/ Multivitamins 10 ml/Chromium/ Copper/Manganese/ 

Seleni/Zn 1 ml/ Insulin Human Regular 15 unit/ Total Parenteral Nutrition/Amino 

Acids/Dextrose/ Fat Emulsion Intravenous 1,920 ml @  80 mls/hr TPN  CONT IV  

Last administered on 6/25/20at 22:25;  Start 6/25/20 at 22:00;  Stop 6/26/20 at 

21:59;  Status DC


Sodium Chloride 80 meq/Potassium Chloride 30 meq/ Potassium Acetate 30 

meq/Magnesium Sulfate 14 meq/ Multivitamins 10 ml/Chromium/ Copper/Manganese/ 

Seleni/Zn 1 ml/ Insulin Human Regular 15 unit/ Total Parenteral Nutrition/Amino 

Acids/Dextrose/ Fat Emulsion Intravenous 1,920 ml @  80 mls/hr TPN  CONT IV  

Last administered on 6/26/20at 21:32;  Start 6/26/20 at 22:00;  Stop 6/27/20 at 

21:59;  Status DC


Sodium Chloride 80 meq/Potassium Chloride 30 meq/ Potassium Acetate 30 

meq/Magnesium Sulfate 14 meq/ Multivitamins 10 ml/Chromium/ Copper/Manganese/ 

Seleni/Zn 1 ml/ Insulin Human Regular 15 unit/ Total Parenteral Nutrition/Amino 

Acids/Dextrose/ Fat Emulsion Intravenous 1,920 ml @  80 mls/hr TPN  CONT IV  

Last administered on 6/27/20at 21:53;  Start 6/27/20 at 22:00;  Stop 6/28/20 at 

21:59;  Status DC


Acetylcysteine (Mucomyst 20% Resp Treatment) 600 mg RTBID NEB  Last administered

on 8/17/20at 08:00;  Start 6/27/20 at 12:00


Sodium Chloride 80 meq/Potassium Chloride 30 meq/ Potassium Acetate 30 

meq/Magnesium Sulfate 14 meq/ Multivitamins 10 ml/Chromium/ Copper/Manganese/ 

Seleni/Zn 1 ml/ Insulin Human Regular 15 unit/ Total Parenteral Nutrition/Amino 

Acids/Dextrose/ Fat Emulsion Intravenous 1,920 ml @  80 mls/hr TPN  CONT IV  

Last administered on 6/28/20at 22:06;  Start 6/28/20 at 22:00;  Stop 6/29/20 at 

21:59;  Status DC


Meropenem 500 mg/ Sodium Chloride 50 ml @  100 mls/hr Q6HRS IV  Last adm

inistered on 7/21/20at 06:15;  Start 6/28/20 at 18:00;  Stop 7/21/20 at 08:23;  

Status DC


Daptomycin 500 mg/ Sodium Chloride 50 ml @  100 mls/hr Q24H IV  Last 

administered on 7/6/20at 21:47;  Start 6/28/20 at 19:00;  Stop 7/7/20 at 08:13; 

Status DC


Sodium Chloride 80 meq/Potassium Chloride 30 meq/ Potassium Acetate 30 

meq/Magnesium Sulfate 14 meq/ Multivitamins 10 ml/Chromium/ Copper/Manganese/ 

Seleni/Zn 1 ml/ Insulin Human Regular 15 unit/ Total Parenteral Nutrition/Amino 

Acids/Dextrose/ Fat Emulsion Intravenous 1,920 ml @  80 mls/hr TPN  CONT IV  

Last administered on 6/29/20at 22:09;  Start 6/29/20 at 22:00;  Stop 6/30/20 at 

21:59;  Status DC


Heparin Sodium (Porcine) 1000 unit/Sodium Chloride 1,001 ml @  1,001 mls/hr 1X  

ONCE IRR ;  Start 6/30/20 at 06:00;  Stop 6/30/20 at 06:59;  Status DC


Propofol (Diprivan) 200 mg STK-MED ONCE IV ;  Start 6/30/20 at 07:44;  Stop 

6/30/20 at 07:44;  Status DC


Lidocaine HCl (Lidocaine Pf 2% Vial) 5 ml STK-MED ONCE .ROUTE ;  Start 6/30/20 

at 07:44;  Stop 6/30/20 at 07:44;  Status DC


Fentanyl Citrate (Fentanyl 2ml Vial) 100 mcg STK-MED ONCE .ROUTE ;  Start 

6/30/20 at 07:44;  Stop 6/30/20 at 07:44;  Status DC


Rocuronium Bromide (Zemuron) 100 mg STK-MED ONCE .ROUTE ;  Start 6/30/20 at 

07:44;  Stop 6/30/20 at 07:44;  Status DC


Micafungin Sodium 100 mg/Dextrose 100 ml @  100 mls/hr Q24H IV  Last 

administered on 8/4/20at 09:30;  Start 6/30/20 at 08:30;  Stop 8/5/20 at 08:20; 

Status DC


Bupivacaine HCl/ Epinephrine Bitart (Sensorcain-Epi 0.5%-1:[id number] Mpf) 30 ml 

STK-MED ONCE .ROUTE ;  Start 6/30/20 at 08:34;  Stop 6/30/20 at 08:35;  Status 

DC


Iohexol (Omnipaque 300 Mg/ml) 50 ml STK-MED ONCE .ROUTE  Last administered on 

6/30/20at 13:30;  Start 6/30/20 at 08:35;  Stop 6/30/20 at 08:35;  Status DC


Sodium Chloride 80 meq/Potassium Chloride 30 meq/ Potassium Acetate 30 

meq/Magnesium Sulfate 14 meq/ Multivitamins 10 ml/Chromium/ Copper/Manganese/ 

Seleni/Zn 1 ml/ Insulin Human Regular 15 unit/ Total Parenteral Nutrition/Amino 

Acids/Dextrose/ Fat Emulsion Intravenous 1,920 ml @  80 mls/hr TPN  CONT IV  

Last administered on 7/1/20at 01:22;  Start 6/30/20 at 22:00;  Stop 7/1/20 at 

21:59;  Status DC


Phenylephrine HCl (Ken-Synephrine Inj) 10 mg STK-MED ONCE .ROUTE ;  Start 

6/30/20 at 10:15;  Stop 6/30/20 at 10:15;  Status DC


Desflurane (Suprane) 90 ml STK-MED ONCE IH ;  Start 6/30/20 at 10:18;  Stop 

6/30/20 at 10:19;  Status DC


Albumin Human 500 ml @  As Directed STK-MED ONCE IV ;  Start 6/30/20 at 11:06;  

Stop 6/30/20 at 11:06;  Status DC


Vasopressin (Vasostrict) 20 unit STK-MED ONCE .ROUTE ;  Start 6/30/20 at 12:23; 

Stop 6/30/20 at 12:23;  Status DC


Phenylephrine HCl (Ken-Synephrine Inj) 10 mg STK-MED ONCE .ROUTE ;  Start 

6/30/20 at 13:33;  Stop 6/30/20 at 13:33;  Status DC


Phenylephrine HCl (Ken-Synephrine Inj) 10 mg STK-MED ONCE .ROUTE ;  Start 

6/30/20 at 13:33;  Stop 6/30/20 at 13:33;  Status DC


Ondansetron HCl (Zofran) 4 mg STK-MED ONCE .ROUTE ;  Start 6/30/20 at 13:33;  

Stop 6/30/20 at 13:33;  Status DC


Enoxaparin Sodium (Lovenox 40mg Syringe) 40 mg Q24H SQ  Last administered on 

8/16/20at 09:31;  Start 7/1/20 at 08:00;  Stop 8/17/20 at 17:07;  Status DC


Sodium Chloride (Normal Saline Flush) 3 ml QSHIFT  PRN IV AFTER MEDS AND BLOOD 

DRAWS;  Start 6/30/20 at 14:45;  Stop 8/3/20 at 09:45;  Status DC


Naloxone HCl (Narcan) 0.4 mg PRN Q2MIN  PRN IV SEE INSTRUCTIONS;  Start 6/30/20 

at 14:45;  Stop 8/3/20 at 09:45;  Status DC


Sodium Chloride 1,000 ml @  25 mls/hr Q24H IV  Last administered on 8/2/20at 

20:55;  Start 6/30/20 at 14:33;  Stop 8/3/20 at 09:45;  Status DC


Morphine Sulfate (Morphine Sulfate) 1 mg PRN Q1HR  PRN IV PAIN Last administered

on 7/25/20at 18:28;  Start 6/30/20 at 14:45;  Stop 8/3/20 at 09:45;  Status DC


Midazolam HCl 100 mg/Sodium Chloride 100 ml @ 1 mls/hr CONT  PRN IV SEE I/O 

RECORD Last administered on 7/3/20at 18:48;  Start 6/30/20 at 14:45;  Stop 

8/3/20 at 09:45;  Status DC


Phenylephrine HCl (PHENYLEPHRINE in 0.9% NACL PF) 1 mg STK-MED ONCE IV ;  Start 

6/30/20 at 14:44;  Stop 6/30/20 at 14:45;  Status DC


Ephedrine Sulfate (ePHEDrine PF IN SALINE SYRINGE) 50 mg STK-MED ONCE IV ;  S

tart 6/30/20 at 14:45;  Stop 6/30/20 at 14:45;  Status DC


Vasopressin 20 unit/Dextrose 101 ml @  12 mls/hr CONT  PRN IV SEE I/O RECORD 

Last administered on 7/7/20at 04:17;  Start 6/30/20 at 15:30;  Stop 8/3/20 at 

09:45;  Status DC


Sodium Chloride 1,000 ml @  1,000 mls/hr 1X  ONCE IV  Last administered on 

6/30/20at 15:42;  Start 6/30/20 at 15:45;  Stop 6/30/20 at 16:44;  Status DC


Albumin Human 500 ml @  125 mls/hr 1X  ONCE IV ;  Start 6/30/20 at 16:00;  Stop 

6/30/20 at 19:59;  Status DC


Albumin Human 500 ml @  125 mls/hr PRN Q1HR  PRN IV PER PROTOCOL;  Start 6/30/20

at 15:45;  Stop 8/3/20 at 09:52;  Status DC


Magnesium Sulfate 50 ml @ 25 mls/hr 1X  ONCE IV  Last administered on 6/30/20at 

17:02;  Start 6/30/20 at 16:30;  Stop 6/30/20 at 18:29;  Status DC


Sodium Bicarbonate (Sodium Bicarb Adult 8.4% Syr) 50 meq STK-MED ONCE .ROUTE ;  

Start 6/30/20 at 16:20;  Stop 6/30/20 at 16:20;  Status DC


Sodium Bicarbonate (Sodium Bicarb Adult 8.4% Syr) 100 meq 1X  ONCE IV  Last 

administered on 6/30/20at 17:07;  Start 6/30/20 at 16:30;  Stop 6/30/20 at 

16:31;  Status DC


Sodium Bicarbonate 150 meq/Dextrose 1,150 ml @  75 mls/hr 1X  ONCE IV  Last admi

nistered on 6/30/20at 20:02;  Start 6/30/20 at 16:30;  Stop 7/1/20 at 07:49;  

Status DC


Sodium Chloride 80 meq/Potassium Chloride 30 meq/ Potassium Acetate 30 

meq/Magnesium Sulfate 14 meq/ Multivitamins 10 ml/Chromium/ Copper/Manganese/ 

Seleni/Zn 1 ml/ Insulin Human Regular 15 unit/ Total Parenteral Nutrition/Amino 

Acids/Dextrose/ Fat Emulsion Intravenous 1,920 ml @  80 mls/hr TPN  CONT IV  

Last administered on 7/1/20at 23:05;  Start 7/1/20 at 22:00;  Stop 7/2/20 at 

21:59;  Status DC


Sodium Chloride 100 meq/Potassium Chloride 30 meq/ Potassium Acetate 30 

meq/Magnesium Sulfate 12 meq/ Multivitamins 10 ml/Chromium/ Copper/Manganese/ S

haley/Zn 1 ml/ Insulin Human Regular 15 unit/ Total Parenteral Nutrition/Amino 

Acids/Dextrose/ Fat Emulsion Intravenous 1,920 ml @  80 mls/hr TPN  CONT IV  

Last administered on 7/2/20at 21:52;  Start 7/2/20 at 22:00;  Stop 7/3/20 at 

21:59;  Status DC


Sodium Chloride 100 meq/Potassium Chloride 30 meq/ Potassium Acetate 30 

meq/Magnesium Sulfate 12 meq/ Multivitamins 10 ml/Chromium/ Copper/Manganese/ 

Seleni/Zn 1 ml/ Insulin Human Regular 15 unit/ Total Parenteral Nutrition/Amino 

Acids/Dextrose/ Fat Emulsion Intravenous 1,920 ml @  80 mls/hr TPN  CONT IV  

Last administered on 7/3/20at 21:46;  Start 7/3/20 at 22:00;  Stop 7/4/20 at 

21:59;  Status DC


Sodium Chloride 100 meq/Potassium Chloride 30 meq/ Potassium Acetate 30 

meq/Magnesium Sulfate 12 meq/ Multivitamins 10 ml/Chromium/ Copper/Manganese/ 

Seleni/Zn 1 ml/ Insulin Human Regular 15 unit/ Total Parenteral Nutrition/Amino 

Acids/Dextrose/ Fat Emulsion Intravenous 1,800 ml @  75 mls/hr TPN  CONT IV  

Last administered on 7/4/20at 22:04;  Start 7/4/20 at 22:00;  Stop 7/5/20 at 

21:59;  Status DC


Fentanyl Citrate 55 ml @ 0 mls/hr CONT  PRN IV SEE COMMENTS Last administered on

7/6/20at 23:55;  Start 7/4/20 at 13:00;  Stop 7/9/20 at 17:28;  Status DC


Sodium Chloride 100 meq/Potassium Chloride 30 meq/ Potassium Acetate 30 

meq/Magnesium Sulfate 12 meq/ Multivitamins 10 ml/Chromium/ Copper/Manganese/ 

Seleni/Zn 1 ml/ Insulin Human Regular 15 unit/ Total Parenteral Nutrition/Amino 

Acids/Dextrose/ Fat Emulsion Intravenous 1,680 ml @  70 mls/hr TPN  CONT IV  

Last administered on 7/5/20at 21:23;  Start 7/5/20 at 22:00;  Stop 7/6/20 at 

21:59;  Status DC


Sodium Chloride 110 meq/Potassium Chloride 30 meq/ Potassium Acetate 30 

meq/Magnesium Sulfate 15 meq/ Multivitamins 10 ml/Chromium/ Copper/Manganese/ 

Seleni/Zn 1 ml/ Insulin Human Regular 15 unit/ Total Parenteral Nutrition/Amino 

Acids/Dextrose/ Fat Emulsion Intravenous 1,680 ml @  70 mls/hr TPN  CONT IV  L

ast administered on 7/6/20at 21:48;  Start 7/6/20 at 22:00;  Stop 7/7/20 at 

21:59;  Status DC


Sodium Chloride 110 meq/Potassium Chloride 30 meq/ Potassium Acetate 30 

meq/Magnesium Sulfate 15 meq/ Multivitamins 10 ml/Chromium/ Copper/Manganese/ 

Seleni/Zn 1 ml/ Insulin Human Regular 15 unit/ Total Parenteral Nutrition/Amino 

Acids/Dextrose/ Fat Emulsion Intravenous 1,680 ml @  70 mls/hr TPN  CONT IV  

Last administered on 7/7/20at 21:33;  Start 7/7/20 at 22:00;  Stop 7/8/20 at 

21:59;  Status DC


Sodium Chloride 110 meq/Potassium Chloride 30 meq/ Potassium Acetate 30 

meq/Magnesium Sulfate 15 meq/ Multivitamins 10 ml/Chromium/ Copper/Manganese/ 

Seleni/Zn 1 ml/ Insulin Human Regular 15 unit/ Total Parenteral Nutrition/Amino 

Acids/Dextrose/ Fat Emulsion Intravenous 1,680 ml @  70 mls/hr TPN  CONT IV  

Last administered on 7/8/20at 21:51;  Start 7/8/20 at 22:00;  Stop 7/9/20 at 

21:59;  Status DC


Sodium Chloride 90 meq/Potassium Chloride 30 meq/ Potassium Acetate 30 

meq/Magnesium Sulfate 15 meq/ Multivitamins 10 ml/Chromium/ Copper/Manganese/ 

Seleni/Zn 1 ml/ Insulin Human Regular 15 unit/ Total Parenteral Nutrition/Amino 

Acids/Dextrose/ Fat Emulsion Intravenous 1,680 ml @  70 mls/hr TPN  CONT IV  

Last administered on 7/9/20at 22:38;  Start 7/9/20 at 22:00;  Stop 7/10/20 at 

21:59;  Status DC


Fentanyl Citrate 30 ml @ 0 mls/hr CONT  PRN IV SEE I/O RECORD;  Start 7/9/20 at 

17:30;  Stop 8/3/20 at 09:45;  Status DC


Fentanyl (Duragesic 12mcg/ Hr Patch) 1 patch Q3DAYS TD  Last administered on 

8/15/20at 08:38;  Start 7/10/20 at 09:00;  Stop 8/17/20 at 17:07;  Status DC


Sodium Chloride 90 meq/Potassium Chloride 30 meq/ Potassium Acetate 30 

meq/Magnesium Sulfate 15 meq/ Multivitamins 10 ml/Chromium/ Copper/Manganese/ 

Seleni/Zn 1 ml/ Insulin Human Regular 15 unit/ Total Parenteral Nutrition/Amino 

Acids/Dextrose/ Fat Emulsion Intravenous 1,680 ml @  70 mls/hr TPN  CONT IV  

Last administered on 7/10/20at 21:59;  Start 7/10/20 at 22:00;  Stop 7/11/20 at 

21:59;  Status DC


Sodium Chloride 90 meq/Potassium Chloride 30 meq/ Potassium Acetate 30 

meq/Magnesium Sulfate 15 meq/ Multivitamins 10 ml/Chromium/ Copper/Manganese/ 

Seleni/Zn 1 ml/ Insulin Human Regular 15 unit/ Total Parenteral Nutrition/Amino 

Acids/Dextrose/ Fat Emulsion Intravenous 1,680 ml @  70 mls/hr TPN  CONT IV  

Last administered on 7/11/20at 21:35;  Start 7/11/20 at 22:00;  Stop 7/12/20 at 

21:59;  Status DC


Vancomycin HCl (Vanco Per Pharmacy) 1 each PRN DAILY  PRN MC SEE COMMENTS Last 

administered on 7/14/20at 02:46;  Start 7/12/20 at 09:15;  Stop 7/15/20 at 

07:41;  Status DC


Ciprofloxacin/ Dextrose 200 ml @  200 mls/hr Q12HR IV  Last administered on 

7/20/20at 21:02;  Start 7/12/20 at 10:00;  Stop 7/21/20 at 08:20;  Status DC


Vancomycin HCl 2 gm/Sodium Chloride 500 ml @  250 mls/hr 1X  ONCE IV  Last 

administered on 7/12/20at 10:34;  Start 7/12/20 at 10:00;  Stop 7/12/20 at 

11:59;  Status DC


Sodium Chloride 90 meq/Potassium Chloride 30 meq/ Potassium Acetate 30 

meq/Magnesium Sulfate 15 meq/ Multivitamins 10 ml/Chromium/ Copper/Manganese/ 

Seleni/Zn 1 ml/ Insulin Human Regular 15 unit/ Total Parenteral Nutrition/Amino 

Acids/Dextrose/ Fat Emulsion Intravenous 1,680 ml @  70 mls/hr TPN  CONT IV  

Last administered on 7/12/20at 22:02;  Start 7/12/20 at 22:00;  Stop 7/13/20 at 

21:59;  Status DC


Diphenhydramine HCl (Benadryl) 25 mg 1X  ONCE IVP  Last administered on 

7/12/20at 14:26;  Start 7/12/20 at 14:30;  Stop 7/12/20 at 14:31;  Status DC


Vancomycin HCl 1.5 gm/Sodium Chloride 500 ml @  250 mls/hr Q8H IV  Last 

administered on 7/13/20at 03:08;  Start 7/12/20 at 18:30;  Stop 7/13/20 at 

12:24;  Status DC


Vancomycin HCl (Vancomycin Trough Level) 1 each 1X  ONCE MC  Last administered 

on 7/13/20at 10:00;  Start 7/13/20 at 10:00;  Stop 7/13/20 at 10:01;  Status DC


Sodium Chloride 90 meq/Potassium Chloride 30 meq/ Potassium Acetate 30 

meq/Magnesium Sulfate 15 meq/ Multivitamins 10 ml/Chromium/ Copper/Manganese/ 

Seleni/Zn 1 ml/ Insulin Human Regular 15 unit/ Total Parenteral Nutrition/Amino 

Acids/Dextrose/ Fat Emulsion Intravenous 1,680 ml @  70 mls/hr TPN  CONT IV  

Last administered on 7/13/20at 22:13;  Start 7/13/20 at 22:00;  Stop 7/14/20 at 

21:59;  Status DC


Vancomycin HCl (Vancomycin Random Level) 1 each 1X  ONCE MC  Last administered 

on 7/14/20at 01:00;  Start 7/14/20 at 01:00;  Stop 7/14/20 at 01:01;  Status DC


Vancomycin HCl 1.5 gm/Sodium Chloride 500 ml @  250 mls/hr Q12H IV  Last 

administered on 7/14/20at 22:07;  Start 7/14/20 at 10:00;  Stop 7/15/20 at 

07:41;  Status DC


Vancomycin HCl (Vancomycin Trough Level) 1 each 1X  ONCE MC ;  Start 7/15/20 at 

09:30;  Stop 7/15/20 at 09:31;  Status Cancel


Sodium Chloride 90 meq/Potassium Chloride 30 meq/ Potassium Acetate 30 

meq/Magnesium Sulfate 15 meq/ Multivitamins 10 ml/Chromium/ Copper/Manganese/ 

Seleni/Zn 1 ml/ Insulin Human Regular 15 unit/ Total Parenteral Nutrition/Amino 

Acids/Dextrose/ Fat Emulsion Intravenous 1,680 ml @  70 mls/hr TPN  CONT IV  

Last administered on 7/14/20at 22:08;  Start 7/14/20 at 22:00;  Stop 7/15/20 at 

21:59;  Status DC


Alteplase, Recombinant (Cathflo For Central Catheter Clearance) 1 mg 1X  ONCE 

INT CAT  Last administered on 7/14/20at 11:49;  Start 7/14/20 at 11:00;  Stop 

7/14/20 at 11:01;  Status DC


Daptomycin 500 mg/ Sodium Chloride 50 ml @  100 mls/hr Q24H IV  Last 

administered on 7/24/20at 08:25;  Start 7/15/20 at 09:00;  Stop 7/24/20 at 

08:38;  Status DC


Sodium Chloride 90 meq/Potassium Chloride 30 meq/ Potassium Acetate 30 

meq/Magnesium Sulfate 15 meq/ Multivitamins 10 ml/Chromium/ Copper/Manganese/ 

Seleni/Zn 1 ml/ Insulin Human Regular 15 unit/ Total Parenteral Nutrition/Amino 

Acids/Dextrose/ Fat Emulsion Intravenous 1,680 ml @  70 mls/hr TPN  CONT IV  

Last administered on 7/15/20at 22:55;  Start 7/15/20 at 22:00;  Stop 7/16/20 at 

21:59;  Status DC


Sodium Chloride 90 meq/Potassium Chloride 30 meq/ Potassium Acetate 30 

meq/Magnesium Sulfate 15 meq/ Multivitamins 10 ml/Chromium/ Copper/Manganese/ 

Seleni/Zn 1 ml/ Insulin Human Regular 15 unit/ Total Parenteral Nutrition/Amino 

Acids/Dextrose/ Fat Emulsion Intravenous 1,680 ml @  70 mls/hr TPN  CONT IV  

Last administered on 7/16/20at 22:06;  Start 7/16/20 at 22:00;  Stop 7/17/20 at 

21:59;  Status DC


Diphenhydramine HCl (Benadryl) 50 mg STK-MED ONCE .ROUTE ;  Start 7/16/20 at 

18:34;  Stop 7/16/20 at 18:35;  Status DC


Diphenhydramine HCl (Benadryl) 25 mg 1X  ONCE IM ;  Start 7/16/20 at 18:45;  

Stop 7/16/20 at 18:46;  Status DC


Diphenhydramine HCl (Benadryl) 25 mg 1X  ONCE IVP  Last administered on 

7/16/20at 18:56;  Start 7/16/20 at 19:00;  Stop 7/16/20 at 19:01;  Status DC


Alprazolam (Xanax) 0.5 mg PRN TID  PRN PO ANXIETY / AGITATION Last administered 

on 7/23/20at 11:49;  Start 7/17/20 at 08:00;  Stop 7/23/20 at 15:54;  Status DC


Sodium Chloride 110 meq/Potassium Chloride 30 meq/ Potassium Acetate 30 meq/Ma

gnesium Sulfate 15 meq/ Multivitamins 10 ml/Chromium/ Copper/Manganese/ 

Seleni/Zn 1 ml/ Insulin Human Regular 15 unit/ Total Parenteral Nutrition/Amino 

Acids/Dextrose/ Fat Emulsion Intravenous 1,680 ml @  70 mls/hr TPN  CONT IV  

Last administered on 7/17/20at 21:21;  Start 7/17/20 at 22:00;  Stop 7/18/20 at 

21:59;  Status DC


Sodium Chloride 110 meq/Potassium Chloride 30 meq/ Potassium Acetate 30 

meq/Magnesium Sulfate 15 meq/ Multivitamins 10 ml/Chromium/ Copper/Manganese/ 

Seleni/Zn 1 ml/ Insulin Human Regular 15 unit/ Total Parenteral Nutrition/Amino 

Acids/Dextrose/ Fat Emulsion Intravenous 1,680 ml @  70 mls/hr TPN  CONT IV  

Last administered on 7/18/20at 22:01;  Start 7/18/20 at 22:00;  Stop 7/19/20 at 

21:59;  Status DC


Alteplase, Recombinant (Cathflo For Central Catheter Clearance) 1 mg 1X  ONCE 

INT CAT  Last administered on 7/19/20at 08:23;  Start 7/19/20 at 08:15;  Stop 

7/19/20 at 08:16;  Status DC


Sodium Chloride 110 meq/Sodium Phosphate 10 mmol/ Potassium Chloride 30 meq/ 

Potassium Acetate 30 meq/Magnesium Sulfate 15 meq/ Multivitamins 10 ml/Chromium/

Copper/Manganese/ Seleni/Zn 1 ml/ Insulin Human Regular 15 unit/ Total Parent

eral Nutrition/Amino Acids/Dextrose/ Fat Emulsion Intravenous 1,680 ml @  70 

mls/hr TPN  CONT IV  Last administered on 7/19/20at 22:03;  Start 7/19/20 at 

22:00;  Stop 7/20/20 at 21:59;  Status DC


Sodium Chloride 120 meq/Sodium Phosphate 10 mmol/ Potassium Chloride 30 meq/ 

Potassium Acetate 30 meq/Magnesium Sulfate 15 meq/ Multivitamins 10 ml/Chromium/

Copper/Manganese/ Seleni/Zn 1 ml/ Insulin Human Regular 15 unit/ Total 

Parenteral Nutrition/Amino Acids/Dextrose/ Fat Emulsion Intravenous 1,680 ml @  

70 mls/hr TPN  CONT IV  Last administered on 7/20/20at 22:08;  Start 7/20/20 at 

22:00;  Stop 7/21/20 at 21:59;  Status DC


Ceftazidime/ Avibactam 2.5 gm/ Sodium Chloride 100 ml @  50 mls/hr Q8HRS IV  

Last administered on 7/22/20at 05:32;  Start 7/21/20 at 14:00;  Stop 7/22/20 at 

07:48;  Status DC


Alteplase, Recombinant (Cathflo For Central Catheter Clearance) 1 mg 1X  ONCE 

INT CAT  Last administered on 7/21/20at 09:30;  Start 7/21/20 at 09:30;  Stop 

7/21/20 at 09:31;  Status DC


Sodium Chloride 120 meq/Sodium Phosphate 10 mmol/ Potassium Chloride 30 meq/ 

Potassium Acetate 30 meq/Magnesium Sulfate 15 meq/ Multivitamins 10 ml/Chromium/

Copper/Manganese/ Seleni/Zn 1 ml/ Insulin Human Regular 15 unit/ Total 

Parenteral Nutrition/Amino Acids/Dextrose/ Fat Emulsion Intravenous 1,680 ml @  

70 mls/hr TPN  CONT IV  Last administered on 7/21/20at 22:11;  Start 7/21/20 at 

22:00;  Stop 7/22/20 at 21:59;  Status DC


Iohexol (Omnipaque 300 Mg/ml) 75 ml 1X  ONCE IV  Last administered on 7/21/20at 

11:30;  Start 7/21/20 at 11:30;  Stop 7/21/20 at 11:31;  Status DC


Info (CONTRAST GIVEN -- Rx MONITORING) 1 each PRN DAILY  PRN MC SEE COMMENTS;  

Start 7/21/20 at 11:45;  Stop 7/23/20 at 11:44;  Status DC


Alteplase, Recombinant (Cathflo For Central Catheter Clearance) 1 mg 1X  ONCE 

INT CAT  Last administered on 7/21/20at 12:17;  Start 7/21/20 at 12:00;  Stop 

7/21/20 at 12:01;  Status DC


Cefepime HCl (Maxipime) 2 gm Q12HR IVP  Last administered on 7/22/20at 20:53;  

Start 7/22/20 at 09:00;  Stop 7/23/20 at 07:30;  Status DC


Sodium Chloride 120 meq/Sodium Phosphate 10 mmol/ Potassium Chloride 30 meq/ 

Potassium Acetate 30 meq/Magnesium Sulfate 15 meq/ Multivitamins 10 ml/Chromium/

Copper/Manganese/ Seleni/Zn 1 ml/ Insulin Human Regular 15 unit/ Total 

Parenteral Nutrition/Amino Acids/Dextrose/ Fat Emulsion Intravenous 1,680 ml @  

70 mls/hr TPN  CONT IV  Last administered on 7/22/20at 21:25;  Start 7/22/20 at 

22:00;  Stop 7/23/20 at 21:59;  Status DC


Ceftazidime/ Avibactam 2.5 gm/ Sodium Chloride 250 ml @  125 mls/hr Q8HRS IV  

Last administered on 8/5/20at 05:38;  Start 7/23/20 at 08:00;  Stop 8/5/20 at 

08:20;  Status DC


Sodium Chloride 120 meq/Sodium Phosphate 10 mmol/ Potassium Chloride 30 meq/ 

Potassium Acetate 30 meq/Magnesium Sulfate 15 meq/ Multivitamins 10 ml/Chromium/

Copper/Manganese/ Seleni/Zn 1 ml/ Insulin Human Regular 15 unit/ Total 

Parenteral Nutrition/Amino Acids/Dextrose/ Fat Emulsion Intravenous 1,680 ml @  

70 mls/hr TPN  CONT IV  Last administered on 7/23/20at 22:35;  Start 7/23/20 at 

22:00;  Stop 7/24/20 at 21:59;  Status DC


Alprazolam (Xanax) 0.5 mg PRN QID  PRN PO ANXIETY / AGITATION Last administered 

on 8/14/20at 08:41;  Start 7/23/20 at 16:00


Acetaminophen/ Hydrocodone Bitart (Lortab 5/325) 1 tab PRN Q4HRS  PRN PO PAIN 

Last administered on 8/15/20at 14:27;  Start 7/23/20 at 16:00


Sodium Chloride 120 meq/Sodium Phosphate 10 mmol/ Potassium Chloride 30 meq/ 

Potassium Acetate 30 meq/Magnesium Sulfate 15 meq/ Multivitamins 10 ml/Chromium/

Copper/Manganese/ Seleni/Zn 1 ml/ Insulin Human Regular 15 unit/ Total 

Parenteral Nutrition/Amino Acids/Dextrose/ Fat Emulsion Intravenous 1,680 ml @  

70 mls/hr TPN  CONT IV  Last administered on 7/24/20at 21:50;  Start 7/24/20 at 

22:00;  Stop 7/25/20 at 21:59;  Status DC


Sodium Chloride 120 meq/Sodium Phosphate 10 mmol/ Potassium Chloride 30 meq/ 

Potassium Acetate 30 meq/Magnesium Sulfate 15 meq/ Multivitamins 10 ml/Chromium/

Copper/Manganese/ Seleni/Zn 1 ml/ Insulin Human Regular 15 unit/ Total 

Parenteral Nutrition/Amino Acids/Dextrose/ Fat Emulsion Intravenous 1,680 ml @  

70 mls/hr TPN  CONT IV  Last administered on 7/25/20at 22:14;  Start 7/25/20 at 

22:00;  Stop 7/26/20 at 21:59;  Status DC


Daptomycin 500 mg/ Sodium Chloride 50 ml @  100 mls/hr Q24H IV  Last 

administered on 8/4/20at 09:20;  Start 7/26/20 at 09:00;  Stop 8/5/20 at 08:20; 

Status DC


Sodium Chloride 120 meq/Sodium Phosphate 10 mmol/ Potassium Chloride 30 meq/ 

Potassium Acetate 30 meq/Magnesium Sulfate 15 meq/ Multivitamins 10 ml/Chromium/

Copper/Manganese/ Seleni/Zn 1 ml/ Insulin Human Regular 15 unit/ Total 

Parenteral Nutrition/Amino Acids/Dextrose/ Fat Emulsion Intravenous 1,680 ml @  

70 mls/hr TPN  CONT IV ;  Start 7/26/20 at 22:00;  Stop 7/27/20 at 21:59;  Stat

us Cancel


Amino Acids/ Glycerin/ Electrolytes 1,000 ml @  80 mls/hr [id number] IV  Last 

administered on 8/1/20at 18:10;  Start 7/26/20 at 10:15;  Stop 8/2/20 at 07:07; 

Status DC


Iohexol (Omnipaque 300 Mg/ml) 50 ml 1X  ONCE IJ ;  Start 7/28/20 at 11:00;  Stop

7/28/20 at 11:01;  Status DC


Sodium Chloride 500 ml @  500 mls/hr 1X  ONCE IV  Last administered on 7/28/20at

18:30;  Start 7/28/20 at 18:30;  Stop 7/28/20 at 19:29;  Status DC


Lidocaine HCl (Buffered Lidocaine 1%) 3 ml 1X  ONCE INJ ;  Start 7/30/20 at 12

:15;  Stop 7/30/20 at 12:17;  Status DC


Fentanyl Citrate (Fentanyl 2ml Vial) 25 mcg PRN Q6HRS  PRN IVP SEE INSTUCTIONS 

Last administered on 8/17/20at 11:51;  Start 8/3/20 at 10:00


Sodium Chloride 1,000 ml @  125 mls/hr Q8H IV  Last administered on 8/3/20at 

23:16;  Start 8/3/20 at 23:21;  Stop 8/4/20 at 09:23;  Status DC


Sodium Chloride 1,000 ml @  350 mls/hr 1X  ONCE IV  Last administered on 

8/3/20at 20:29;  Start 8/3/20 at 20:30;  Stop 8/3/20 at 23:21;  Status DC


Vitamin A/Vitamin D (Vitamin A & D Ointment) 1 thomas PRN Q1HR  PRN TP SKIN 

PROTECTION Last administered on 8/13/20at 08:36;  Start 8/4/20 at 09:15;  Stop 

8/17/20 at 17:07;  Status DC


Iohexol (Omnipaque 240 Mg/ml) 50 ml STK-MED ONCE .ROUTE ;  Start 8/4/20 at 

14:18;  Stop 8/4/20 at 14:19;  Status DC


Lidocaine HCl (Buffered Lidocaine 1%) 3 ml STK-MED ONCE .ROUTE ;  Start 8/4/20 

at 14:19;  Stop 8/4/20 at 14:19;  Status DC


Fentanyl Citrate (Fentanyl 2ml Vial) 100 mcg STK-MED ONCE .ROUTE ;  Start 8/4/20

at 15:03;  Stop 8/4/20 at 15:03;  Status DC


Lidocaine HCl (Buffered Lidocaine 1%) 5 ml 1X  ONCE INJ  Last administered on 

8/4/20at 15:00;  Start 8/4/20 at 15:45;  Stop 8/4/20 at 15:46;  Status DC


Iohexol (Omnipaque 240 Mg/ml) 10 ml 1X  ONCE IJ  Last administered on 8/4/20at 

15:00;  Start 8/4/20 at 15:45;  Stop 8/4/20 at 15:46;  Status DC


Multivitamins/ Minerals Therapeutic (Centrum Multivit-Mineral Liq) 5 ml DAILY 

PEG  Last administered on 8/16/20at 09:30;  Start 8/5/20 at 09:00;  Stop 8/17/20

at 17:07;  Status DC


Alteplase, Recombinant 5 mg/ Sodium Chloride 30 ml @ 30 mls/hr 1X  PRN IV SEE 

COMMENTS;  Start 8/5/20 at 06:45;  Status UNV


Alteplase, Recombinant (Cathflo For Central Catheter Clearance) 1 mg 1X  ONCE 

INT CAT  Last administered on 8/5/20at 09:30;  Start 8/5/20 at 07:00;  Stop 

8/5/20 at 07:01;  Status DC


Sodium Chloride 1,000 ml @  125 mls/hr 1X  ONCE IV  Last administered on 

8/5/20at 16:12;  Start 8/5/20 at 14:30;  Stop 8/5/20 at 22:29;  Status DC


Furosemide (Lasix) 40 mg 1X  ONCE IVP  Last administered on 8/5/20at 16:17;  

Start 8/5/20 at 14:30;  Stop 8/5/20 at 14:33;  Status DC


Furosemide (Lasix) 40 mg [id number] IVP  Last administered on 8/6/20at 13:51;  Start 

8/6/20 at 09:00;  Stop 8/6/20 at 14:01;  Status DC


Sodium Chloride 1,000 ml @  125 mls/hr 1X  ONCE IV  Last administered on 

8/6/20at 08:20;  Start 8/6/20 at 07:45;  Stop 8/6/20 at 15:44;  Status DC


Calcitonin [place] (Miacalcin) 400 unit BID SQ  Last administered on 8/6/20at 

18:18;  Start 8/6/20 at 18:00;  Stop 8/7/20 at 15:56;  Status DC


Zoledronic Acid 100 ml @  400 mls/hr 1X  ONCE IV  Last administered on 8/7/20at 

13:04;  Start 8/7/20 at 12:00;  Stop 8/7/20 at 12:14;  Status DC


Metoprolol Tartrate (Lopressor Vial) 5 mg 1X  ONCE IVP  Last administered on 

8/9/20at 10:54;  Start 8/9/20 at 10:45;  Stop 8/9/20 at 10:46;  Status DC


Cefepime HCl (Maxipime) 2 gm Q12HR IVP  Last administered on 8/16/20at 09:32;  

Start 8/10/20 at 10:00;  Stop 8/16/20 at 14:48;  Status DC


Metronidazole 100 ml @  100 mls/hr Q12HR IV  Last administered on 8/16/20at 

09:31;  Start 8/10/20 at 10:00;  Stop 8/16/20 at 14:48;  Status DC


Sodium Chloride 1,000 ml @  75 mls/hr [id number] IV  Last administered on 8/11/20at

09:55;  Start 8/10/20 at 18:45;  Stop 8/12/20 at 11:12;  Status DC


Sodium Chloride 1,000 ml @  1,000 mls/hr 1X  ONCE IV  Last administered on 

8/11/20at 14:00;  Start 8/11/20 at 14:00;  Stop 8/11/20 at 14:59;  Status DC


Ringer's Solution 1,000 ml @  175 mls/hr [id number] IV  Last administered on 

8/15/20at 11:35;  Start 8/11/20 at 14:30;  Stop 8/15/20 at 17:59;  Status DC


Sodium Bicarbonate (Sodium Bicarb Adult 8.4% Syr) 100 meq 1X  ONCE IV  Last 

administered on 8/11/20at 14:32;  Start 8/11/20 at 14:30;  Stop 8/11/20 at 

14:35;  Status DC


Sodium Bicarbonate (Sodium Bicarb Adult 8.4% Syr) 50 meq STK-MED ONCE .ROUTE ;  

Start 8/11/20 at 14:30;  Stop 8/11/20 at 14:30;  Status DC


Sodium Chloride 1,000 ml @  1,000 mls/hr 1X  ONCE IV  Last administered on 

8/12/20at 09:18;  Start 8/12/20 at 08:45;  Stop 8/12/20 at 09:44;  Status DC


Sodium Chloride 1,000 ml @  1,000 mls/hr 1X  ONCE IV  Last administered on 

8/12/20at 17:17;  Start 8/12/20 at 17:15;  Stop 8/12/20 at 18:14;  Status DC


Sodium Chloride 1,000 ml @  1,000 mls/hr 1X  ONCE IV  Last administered on 

8/13/20at 09:39;  Start 8/13/20 at 09:45;  Stop 8/13/20 at 10:44;  Status DC


Sodium Chloride 1,000 ml @  1,000 mls/hr 1X  ONCE IV  Last administered on 

8/13/20at 17:36;  Start 8/13/20 at 18:00;  Stop 8/13/20 at 18:59;  Status DC


Sodium Chloride 1,000 ml @  1,000 mls/hr 1X  ONCE IV  Last administered on 

8/14/20at 13:34;  Start 8/14/20 at 13:00;  Stop 8/14/20 at 13:59;  Status DC


Calcium Gluconate (Calcium Gluconate) 1,000 mg 1X  ONCE IVP  Last administered 

on 8/14/20at 15:25;  Start 8/14/20 at 14:45;  Stop 8/14/20 at 14:53;  Status DC


Potassium Chloride/Water 100 ml @  100 mls/hr Q1H IV  Last administered on 

8/14/20at 17:09;  Start 8/14/20 at 15:00;  Stop 8/14/20 at 16:59;  Status DC


Magnesium Sulfate 50 ml @ 25 mls/hr 1X  ONCE IV  Last administered on 8/14/20at 

15:24;  Start 8/14/20 at 15:00;  Stop 8/14/20 at 16:59;  Status DC


Sodium Bicarbonate 150 meq/Sterile Water 1,150 ml @  500 mls/hr 1X  ONCE IV  

Last administered on 8/14/20at 15:23;  Start 8/14/20 at 15:00;  Stop 8/14/20 at 

17:17;  Status DC


Albumin Human 100 ml @  100 mls/hr Q12H IV  Last administered on 8/17/20at 

06:05;  Start 8/15/20 at 18:00;  Stop 8/17/20 at 17:07;  Status DC


Ringer's Solution 1,000 ml @  75 mls/hr [id number] IV  Last administered on 

8/17/20at 13:29;  Start 8/15/20 at 23:00;  Stop 8/17/20 at 17:07;  Status DC


Acetaminophen (Tylenol) 650 mg PRN Q6HRS  PRN PEG MILD PAIN / TEMP > 100.3'F 

Last administered on 8/16/20at 21:02;  Start 8/16/20 at 04:15


Meropenem 500 mg/ Sodium Chloride 50 ml @  100 mls/hr Q6HRS IV  Last 

administered on 8/17/20at 12:54;  Start 8/16/20 at 16:00;  Stop 8/17/20 at 

17:07;  Status DC


Daptomycin 480 mg/ Sodium Chloride 50 ml @  100 mls/hr Q24H IV  Last a

dministered on 8/16/20at 18:02;  Start 8/16/20 at 16:00;  Stop 8/17/20 at 17:07;

 Status DC


Micafungin Sodium 100 mg/Dextrose 100 ml @  100 mls/hr Q24H IV  Last 

administered on 8/16/20at 15:29;  Start 8/16/20 at 15:00;  Stop 8/17/20 at 

17:07;  Status DC


Diphenhydramine HCl (Benadryl Oral Elixir) 12.5 mg 1X PRN  PRN PO PRE-

TRANSFUSION;  Start 8/17/20 at 01:30


Phytonadione (Vitamin K Ampule) 10 mg 1X  ONCE SQ  Last administered on 

8/17/20at 01:44;  Start 8/17/20 at 02:00;  Stop 8/17/20 at 02:01;  Status DC


Sodium Bicarbonate 50 meq/Sodium Chloride 1,050 ml @  75 mls/hr 1X  ONCE IV  

Last administered on 8/17/20at 01:42;  Start 8/17/20 at 02:00;  Stop 8/17/20 at 

17:07;  Status DC


Sodium Bicarbonate (Sodium Bicarb Adult 8.4% Syr) 50 meq STK-MED ONCE .ROUTE ;  

Start 8/17/20 at 03:55;  Stop 8/17/20 at 03:56;  Status DC


Sodium Bicarbonate (Sodium Bicarb Ped 8.4% Syr) 50 meq 1X  ONCE IV ;  Start 

8/17/20 at 04:15;  Stop 8/17/20 at 04:16;  Status UNV


Sodium Bicarbonate (Sodium Bicarb Adult 8.4% Syr) 50 meq 1X  ONCE IV  Last 

administered on 8/17/20at 04:14;  Start 8/17/20 at 04:15;  Stop 8/17/20 at 

04:16;  Status DC


Ceftazidime/ Avibactam 0.94 gm/ Sodium Chloride 250 ml @  125 mls/hr Q12HR IV ; 

Start 8/17/20 at 13:00;  Stop 8/17/20 at 17:07;  Status DC


Fluconazole/ Sodium Chloride 50 ml @  100 mls/hr ONCE  ONCE IV  Last 

administered on 8/17/20at 11:08;  Start 8/17/20 at 10:15;  Stop 8/17/20 at 17

:07;  Status DC


Sodium Bicarbonate 50 meq/Sodium Chloride 1,050 ml @  75 mls/hr Q14H IV  Last 

administered on 8/17/20at 14:00;  Start 8/17/20 at 14:00;  Stop 8/17/20 at 

17:07;  Status DC


Naloxone HCl (Narcan) 0.4 mg PRN Q2MIN  PRN IV SEE INSTRUCTIONS;  Start 8/17/20 

at 15:15


Sodium Chloride 1,000 ml @  25 mls/hr Q24H IV ;  Start 8/17/20 at 15:06;  Stop 

8/17/20 at 17:07;  Status DC


Fentanyl Citrate 30 ml @ 0 mls/hr CONT  PRN IV SEE PROTOCOL Last administered on

8/17/20at 15:19;  Start 8/17/20 at 15:15;  Stop 8/17/20 at 18:45;  Status DC


Lorazepam (Ativan Inj) 4 mg PRN Q1HR  PRN IVP ANXIETY / AGITATION Last 

administered on 8/18/20at 01:16;  Start 8/17/20 at 15:15


Fentanyl Citrate 55 ml @ 0 mls/hr CONT  PRN IV SEE PROTOCOL Last administered on

8/17/20at 19:39;  Start 8/17/20 at 19:00


Morphine Sulfate (Morphine Sulfate) 4 mg PRN Q1HR  PRN IV PAIN Last administered

on 8/18/20at 11:02;  Start 8/18/20 at 10:15





Active Scripts


Active


Reported


Bisoprolol Fumarate 5 Mg Tablet 10 Mg PO DAILY


Vitals/I & O





Vital Sign - Last 24 Hours








 8/17/20 8/17/20 8/17/20 8/17/20





 12:55 13:00 13:27 13:42


 


Temp   99.1 99.3





   99.1 99.3


 


Pulse  130 131 130


 


Resp  32 32 34


 


B/P (MAP)  101/49 (66) 101/49 109/55


 


Pulse Ox 100 97  


 


O2 Delivery Nasal Cannula Nasal Cannula  


 


O2 Flow Rate 2.0 2.0  


 


    





    





 8/17/20 8/17/20 8/17/20 8/17/20





 14:00 14:11 14:31 15:09


 


Temp  99.2 99.2 





  99.2 99.2 


 


Pulse 129 129 129 129


 


Resp 34  33 40


 


B/P (MAP) 111/61 (78) 111/61 105/58 100/55 (70)


 


Pulse Ox 97   77


 


O2 Delivery Nasal Cannula   Nasal Cannula


 


O2 Flow Rate 2.0   2.0


 


    





    





 8/17/20 8/17/20 8/17/20 8/18/20





 18:26 19:39 20:05 00:03


 


Temp   98.5 98.2





   98.5 98.2


 


Pulse   122 125


 


Resp 40 33 35 35


 


B/P (MAP)   82/46 (58) 89/49 (62)


 


Pulse Ox 91 91 91 89


 


O2 Delivery Nasal Cannula Nasal Cannula Nasal Cannula Nasal Cannula


 


O2 Flow Rate  2.0 2.0 2.0


 


    





    





 8/18/20 8/18/20 8/18/20 8/18/20





 04:07 07:23 08:00 11:02


 


Temp 98.4   





 98.4   


 


Pulse 128 132  


 


Resp 22 27  29


 


B/P (MAP) 97/43 (61) 100/43 (62)  


 


Pulse Ox 98 77  77


 


O2 Delivery NonRebreather Mask Nasal Cannula Nasal Cannula Nasal Cannula


 


O2 Flow Rate 6.0 2.0 2.0 1.0














Intake and Output   


 


 8/17/20 8/17/20 8/18/20





 14:59 22:59 06:59


 


Intake Total 1125 ml 500 ml 0 ml


 


Output Total 75 ml 20 ml 450 ml


 


Balance 1050 ml 480 ml -450 ml








Problem List


Problems


Medical Problems:


(1) Acute pancreatitis


Status: Acute  





(2) Cholelithiasis


Status: Acute  








Assessment


Appears moribund.


Plan of Care Note


Continue comfort care.





Will sign off.





Justicifation of Admission Dx:


Justifications for Admission:


Justification of Admission Dx:  Yes











MARCO ANTONIO LONGO MD          Aug 18, 2020 12:33

## 2020-09-09 NOTE — PDOC
PULMONARY PROGRESS NOTES


Subjective


Patient intubated on 3/23 , s/p trach 4/6, 


Patient responding to verbal stimuli


Vitals





Vital Signs








  Date Time  Temp Pulse Resp B/P (MAP) Pulse Ox O2 Delivery O2 Flow Rate FiO2


 


4/23/20 16:00 100.1 80 26 116/56 (76) 98 C-pap trial  





 100.1       


 


4/23/20 12:31       6.0 








Comments


ros unable to obtain  on vent


Lungs:  Crackles, Other (dimished in BLL  nc at perrl   nose clear   neck trach 

site ok   no lad  no thyromegaly)


Cardiovascular:  S1, S2


Abdomen:  Soft, Non-tender, Other (distended)


Extremities:  Other (+3 generalized edema )


Skin:  Warm, Dry


Labs





Laboratory Tests








Test


 4/21/20


23:33 4/22/20


05:15 4/22/20


05:30 4/22/20


05:59


 


Glucose (Fingerstick)


 145 mg/dL


(70-99) 


 


 135 mg/dL


(70-99)


 


Magnesium Level


 


 2.1 mg/dL


(1.8-2.4) 


 





 


White Blood Count


 


 


 11.9 x10^3/uL


(4.0-11.0) 





 


Red Blood Count


 


 


 2.22 x10^6/uL


(3.50-5.40) 





 


Hemoglobin


 


 


 6.7 g/dL


(12.0-15.5) 





 


Hematocrit


 


 


 20.6 %


(36.0-47.0) 





 


Mean Corpuscular Volume   93 fL ()  


 


Mean Corpuscular Hemoglobin   30 pg (25-35)  


 


Mean Corpuscular Hemoglobin


Concent 


 


 32 g/dL


(31-37) 





 


Red Cell Distribution Width


 


 


 18.8 %


(11.5-14.5) 





 


Platelet Count


 


 


 394 x10^3/uL


(140-400) 





 


Neutrophils (%) (Auto)   82 % (31-73)  


 


Lymphocytes (%) (Auto)   10 % (24-48)  


 


Monocytes (%) (Auto)   7 % (0-9)  


 


Eosinophils (%) (Auto)   1 % (0-3)  


 


Basophils (%) (Auto)   0 % (0-3)  


 


Neutrophils # (Auto)


 


 


 9.7 x10^3/uL


(1.8-7.7) 





 


Lymphocytes # (Auto)


 


 


 1.2 x10^3/uL


(1.0-4.8) 





 


Monocytes # (Auto)


 


 


 0.9 x10^3/uL


(0.0-1.1) 





 


Eosinophils # (Auto)


 


 


 0.1 x10^3/uL


(0.0-0.7) 





 


Basophils # (Auto)


 


 


 0.0 x10^3/uL


(0.0-0.2) 





 


Sodium Level


 


 


 141 mmol/L


(136-145) 





 


Potassium Level


 


 


 3.5 mmol/L


(3.5-5.1) 





 


Chloride Level


 


 


 104 mmol/L


() 





 


Carbon Dioxide Level


 


 


 27 mmol/L


(21-32) 





 


Anion Gap   10 (6-14)  


 


Blood Urea Nitrogen


 


 


 67 mg/dL


(7-20) 





 


Creatinine


 


 


 1.4 mg/dL


(0.6-1.0) 





 


Estimated GFR


(Cockcroft-Gault) 


 


 40.0 


 





 


Glucose Level


 


 


 143 mg/dL


(70-99) 





 


Calcium Level


 


 


 8.8 mg/dL


(8.5-10.1) 





 


Phosphorus Level


 


 


 3.1 mg/dL


(2.6-4.7) 





 


Albumin


 


 


 2.7 g/dL


(3.4-5.0) 





 


Test


 4/22/20


12:19 4/22/20


18:19 4/23/20


00:14 4/23/20


06:20


 


Glucose (Fingerstick)


 146 mg/dL


(70-99) 146 mg/dL


(70-99) 141 mg/dL


(70-99) 





 


Sodium Level


 


 


 


 144 mmol/L


(136-145)


 


Potassium Level


 


 


 


 3.6 mmol/L


(3.5-5.1)


 


Chloride Level


 


 


 


 107 mmol/L


()


 


Carbon Dioxide Level


 


 


 


 26 mmol/L


(21-32)


 


Anion Gap    11 (6-14) 


 


Blood Urea Nitrogen


 


 


 


 78 mg/dL


(7-20)


 


Creatinine


 


 


 


 1.4 mg/dL


(0.6-1.0)


 


Estimated GFR


(Cockcroft-Gault) 


 


 


 40.0 





 


Glucose Level


 


 


 


 146 mg/dL


(70-99)


 


Calcium Level


 


 


 


 9.4 mg/dL


(8.5-10.1)


 


Phosphorus Level


 


 


 


 3.8 mg/dL


(2.6-4.7)


 


Triglycerides Level


 


 


 


 148 mg/dL


(0-150)


 


Test


 4/23/20


06:23 4/23/20


06:35 4/23/20


12:21 





 


Glucose (Fingerstick)


 134 mg/dL


(70-99) 


 146 mg/dL


(70-99) 





 


White Blood Count


 


 15.6 x10^3/uL


(4.0-11.0) 


 





 


Red Blood Count


 


 3.19 x10^6/uL


(3.50-5.40) 


 





 


Hemoglobin


 


 9.4 g/dL


(12.0-15.5) 


 





 


Hematocrit


 


 28.6 %


(36.0-47.0) 


 





 


Mean Corpuscular Volume  90 fL ()   


 


Mean Corpuscular Hemoglobin  30 pg (25-35)   


 


Mean Corpuscular Hemoglobin


Concent 


 33 g/dL


(31-37) 


 





 


Red Cell Distribution Width


 


 18.4 %


(11.5-14.5) 


 





 


Platelet Count


 


 343 x10^3/uL


(140-400) 


 





 


Neutrophils (%) (Auto)  82 % (31-73)   


 


Lymphocytes (%) (Auto)  10 % (24-48)   


 


Monocytes (%) (Auto)  8 % (0-9)   


 


Eosinophils (%) (Auto)  0 % (0-3)   


 


Basophils (%) (Auto)  0 % (0-3)   


 


Neutrophils # (Auto)


 


 12.8 x10^3/uL


(1.8-7.7) 


 





 


Lymphocytes # (Auto)


 


 1.5 x10^3/uL


(1.0-4.8) 


 





 


Monocytes # (Auto)


 


 1.3 x10^3/uL


(0.0-1.1) 


 





 


Eosinophils # (Auto)


 


 0.0 x10^3/uL


(0.0-0.7) 


 





 


Basophils # (Auto)


 


 0.0 x10^3/uL


(0.0-0.2) 


 











Laboratory Tests








Test


 4/22/20


18:19 4/23/20


00:14 4/23/20


06:20 4/23/20


06:23


 


Glucose (Fingerstick)


 146 mg/dL


(70-99) 141 mg/dL


(70-99) 


 134 mg/dL


(70-99)


 


Sodium Level


 


 


 144 mmol/L


(136-145) 





 


Potassium Level


 


 


 3.6 mmol/L


(3.5-5.1) 





 


Chloride Level


 


 


 107 mmol/L


() 





 


Carbon Dioxide Level


 


 


 26 mmol/L


(21-32) 





 


Anion Gap   11 (6-14)  


 


Blood Urea Nitrogen


 


 


 78 mg/dL


(7-20) 





 


Creatinine


 


 


 1.4 mg/dL


(0.6-1.0) 





 


Estimated GFR


(Cockcroft-Gault) 


 


 40.0 


 





 


Glucose Level


 


 


 146 mg/dL


(70-99) 





 


Calcium Level


 


 


 9.4 mg/dL


(8.5-10.1) 





 


Phosphorus Level


 


 


 3.8 mg/dL


(2.6-4.7) 





 


Triglycerides Level


 


 


 148 mg/dL


(0-150) 





 


Test


 4/23/20


06:35 4/23/20


12:21 


 





 


White Blood Count


 15.6 x10^3/uL


(4.0-11.0) 


 


 





 


Red Blood Count


 3.19 x10^6/uL


(3.50-5.40) 


 


 





 


Hemoglobin


 9.4 g/dL


(12.0-15.5) 


 


 





 


Hematocrit


 28.6 %


(36.0-47.0) 


 


 





 


Mean Corpuscular Volume 90 fL ()    


 


Mean Corpuscular Hemoglobin 30 pg (25-35)    


 


Mean Corpuscular Hemoglobin


Concent 33 g/dL


(31-37) 


 


 





 


Red Cell Distribution Width


 18.4 %


(11.5-14.5) 


 


 





 


Platelet Count


 343 x10^3/uL


(140-400) 


 


 





 


Neutrophils (%) (Auto) 82 % (31-73)    


 


Lymphocytes (%) (Auto) 10 % (24-48)    


 


Monocytes (%) (Auto) 8 % (0-9)    


 


Eosinophils (%) (Auto) 0 % (0-3)    


 


Basophils (%) (Auto) 0 % (0-3)    


 


Neutrophils # (Auto)


 12.8 x10^3/uL


(1.8-7.7) 


 


 





 


Lymphocytes # (Auto)


 1.5 x10^3/uL


(1.0-4.8) 


 


 





 


Monocytes # (Auto)


 1.3 x10^3/uL


(0.0-1.1) 


 


 





 


Eosinophils # (Auto)


 0.0 x10^3/uL


(0.0-0.7) 


 


 





 


Basophils # (Auto)


 0.0 x10^3/uL


(0.0-0.2) 


 


 





 


Glucose (Fingerstick)


 


 146 mg/dL


(70-99) 


 











Medications





Active Scripts








 Medications  Dose


 Route/Sig


 Max Daily Dose Days Date Category


 


 Bisoprolol


 Fumarate 5 Mg


 Tablet  10 Mg


 PO DAILY


   3/16/20 Reported











Impression


.


IMPRESSION:


1.  Acute hypoxemic respiratory failure secondary to ARDS status post trach,


2.  Gallstone pancreatitis


3.  Severe metabolic acidosis.stable


4.  Acute kidney injury-stable, ON HD-- continue to improve 


5.  Acute gallstone pancreatitis.


6.  Hypoalbuminemia.


7.  Moderate persistent effusions


8.  Fever-persist.  Per ID, per surgery


9.  Chronic anemia


10. Covid 19 testing negative


11. Moderate to large ascites-S/P paracentisis


S/P paracentisis with 4 liters removed on 4/15/20





Plan


.


Patient still febrile, will continue same, apparently she may undergo surgery 

next week


Pressure support trials as tolerated


PT


hep sq and protonix for prophylaxis


Follow surgery recs


Follow ID rec, abx per id


Follow nephrology recs 


Nutritional support per surgery


continue TPN for nutrition 





DVT/GI PPX 


d/w RN/RT











STEVE MIRANDA MD              Apr 23, 2020 16:43 none

## 2022-09-23 NOTE — PDOC
Infectious Disease Note


Subjective:


Subjective


Patient agitated last night


low grade fevers last night


d/w rn


 off vent, trach o2





Vital Signs:


Vital Signs





Vital Signs








  Date Time  Temp Pulse Resp B/P (MAP) Pulse Ox O2 Delivery O2 Flow Rate FiO2


 


5/19/20 06:00  111 18 104/61 (75) 100 Ventilator  


 


5/19/20 04:00 100.6       





 100.6       


 


5/18/20 23:00       3.0 











Physical Exam:


PHYSICAL EXAM


GENERAL: Semi-sedated  ,comfortable


HEENT:  oral cavity dry, NGT


NECK:  Trach with speaking valve


LUNGS: no rhonchi


HEART:  S1, S2, regular 


ABDOMEN: mod Distended, hypoactive BS, tender, + drainsx1


: [place] (4/14)


EXTREMITIES: Generalized edema, improving, no cyanosis, SCDs bilaterally 


SKIN: Warm and dry.  No generalized rash.  


CNS: Very weak 


PICC(4/30) clean





Medications:


Inpatient Meds:





Current Medications








 Medications


  (Trade)  Dose


 Ordered  Sig/Yee  Start Time


 Stop Time Status Last Admin


Dose Admin


 


 Acetaminophen


  (Tylenol Supp)  650 mg  PRN Q6HRS  PRN  3/24/20 10:30


    5/5/20 09:12


650 MG


 


 Acetaminophen


  (Tylenol)  650 mg  PRN Q6HRS  PRN  3/21/20 03:36


 5/13/20 10:25 DC 4/16/20 19:56


650 MG


 


 Albumin Human  100 ml @ 


 100 mls/hr  1X PRN  PRN  5/19/20 01:30


     





 


 Albuterol Sulfate


  (Ventolin Neb


 Soln)  2.5 mg  1X  ONCE  3/17/20 22:30


 3/17/20 22:31 DC 3/18/20 00:56


2.5 MG


 


 Alteplase,


 Recombinant


  (Cathflo For


 Central Catheter


 Clearance)  1 mg  1X  ONCE  4/24/20 10:45


 4/24/20 10:46 DC 4/24/20 11:44


1 MG


 


 Amino Acids/


 Glycerin/


 Electrolytes  1,000 ml @ 


 75 mls/hr  [id number]  4/20/20 21:15


   UNV  





 


 Artificial Tears


  (Artificial


 Tears)  1 drop  PRN Q15MIN  PRN  4/29/20 05:30


    5/18/20 08:08


1 DROP


 


 Atenolol


  (Tenormin)  100 mg  DAILY  3/17/20 09:00


 3/16/20 20:08 DC  





 


 Atropine Sulfate


  (ATROPINE 0.5mg


 SYRINGE)  0.5 mg  PRN Q5MIN  PRN  4/2/20 08:15


     





 


 Benzocaine


  (Hurricaine One)  1 spray  1X  ONCE  3/20/20 14:30


 3/20/20 14:31 DC 3/20/20 16:38


1 SPRAY


 


 Bisacodyl


  (Dulcolax Supp)  10 mg  STK-MED ONCE  4/27/20 10:59


 4/27/20 10:59 DC  





 


 Bumetanide


  (Bumex)  2 mg  DAILY  5/8/20 10:00


 5/18/20 17:15 DC 5/18/20 08:07


2 MG


 


 Bupivacaine HCl/


 Epinephrine Bitart


  (Sensorcain-Epi


 0.5%-1:005331 Mpf)  30 ml  STK-MED ONCE  4/27/20 10:58


 4/27/20 10:58 DC 4/27/20 12:01


7 ML


 


 Calcium Carbonate/


 Glycine


  (Tums)  500 mg  PRN [id number]  PRN  3/18/20 17:45


 5/13/20 10:25 DC  





 


 Calcium Chloride


 1000 mg/Sodium


 Chloride  110 ml @ 


 220 mls/hr  1X  ONCE  3/17/20 22:30


 3/17/20 22:59 DC 3/17/20 22:11


220 MLS/HR


 


 Calcium Chloride


 3000 mg/Sodium


 Chloride  1,030 ml @ 


 50 mls/hr  [id number]  3/19/20 08:00


 3/21/20 15:23 DC 3/21/20 02:17


50 MLS/HR


 


 Calcium Gluconate


  (Calcium


 Gluconate)  2,000 mg  1X  ONCE  3/19/20 02:15


 3/19/20 02:16 DC 3/19/20 02:19


2,000 MG


 


 Calcium Gluconate


 1000 mg/Sodium


 Chloride  110 ml @ 


 220 mls/hr  1X  ONCE  3/18/20 03:30


 3/18/20 03:59 DC 3/18/20 03:21


220 MLS/HR


 


 Calcium Gluconate


 2000 mg/Sodium


 Chloride  120 ml @ 


 220 mls/hr  1X  ONCE  3/18/20 07:30


 3/18/20 08:02 DC 3/18/20 09:05


220 MLS/HR


 


 Cefepime HCl


  (Maxipime)  2 gm  Q12HR  3/25/20 09:00


 4/8/20 09:58 DC 4/7/20 20:56


2 GM


 


 Cellulose


  (Surgicel


 Fibrillar 1x2)  1 each  STK-MED ONCE  4/6/20 11:00


 4/6/20 11:01 DC  





 


 Cellulose


  (Surgicel


 Hemostat 2x14)  1 each  STK-MED ONCE  4/27/20 10:58


 4/27/20 10:59 DC  





 


 Cellulose


  (Surgicel


 Hemostat 4x8)  1 each  STK-MED ONCE  4/27/20 10:58


 4/27/20 10:59 DC  





 


 Cyclobenzaprine


 HCl


  (Flexeril)  10 mg  PRN Q6HRS  PRN  4/30/20 10:45


     





 


 Daptomycin 430 mg/


 Sodium Chloride  50 ml @ 


 100 mls/hr  Q24H  4/25/20 13:00


 4/30/20 20:58 DC 4/30/20 13:00


100 MLS/HR


 


 Daptomycin 450 mg/


 Sodium Chloride  50 ml @ 


 100 mls/hr  Q24H  5/17/20 09:00


    5/18/20 08:08


100 MLS/HR


 


 Daptomycin 485 mg/


 Sodium Chloride  50 ml @ 


 100 mls/hr  Q24H  5/4/20 11:00


 5/12/20 07:44 DC 5/11/20 13:10


100 MLS/HR


 


 Daptomycin 500 mg/


 Sodium Chloride  50 ml @ 


 100 mls/hr  Q48H  3/25/20 08:30


 4/10/20 10:07 DC 4/10/20 09:57


100 MLS/HR


 


 Dexamethasone


 Sodium Phosphate


  (Decadron)  4 mg  STK-MED ONCE  4/27/20 10:56


 4/27/20 10:57 DC  





 


 Dexmedetomidine


 HCl 400 mcg/


 Sodium Chloride  100 ml @ 0


 mls/hr  CONT  PRN  4/2/20 08:15


    5/19/20 05:04


16.1 MLS/HR


 


 Dextrose


  (Dextrose


 50%-Water Syringe)  12.5 gm  PRN Q15MIN  PRN  3/16/20 09:30


     





 


 Digoxin


  (Lanoxin)  125 mcg  1X  ONCE  3/19/20 18:00


 3/19/20 18:01 DC 3/19/20 17:10


125 MCG


 


 Diphenhydramine


 HCl


  (Benadryl)  25 mg  1X PRN  PRN  4/24/20 15:45


 4/25/20 15:44 DC  





 


 Duloxetine HCl


  (Cymbalta)  30 mg  DAILY  5/10/20 14:00


 5/13/20 10:25 DC 5/11/20 09:48


30 MG


 


 Enoxaparin Sodium


  (Lovenox 100mg


 Syringe)  100 mg  Q12HR  4/21/20 21:00


   UNV  





 


 Enoxaparin Sodium


  (Lovenox 40mg


 Syringe)  40 mg  Q24H  5/17/20 17:00


    5/18/20 17:05


40 MG


 


 Etomidate


  (Amidate)  8 mg  1X  ONCE  3/23/20 08:30


 3/23/20 08:31 DC 3/23/20 08:33


8 MG


 


 Fentanyl Citrate


  (Fentanyl 2ml


 Vial)  25 mcg  PRN Q4HRS  PRN  5/18/20 13:15


    5/18/20 17:13


25 MCG


 


 Fentanyl Citrate


  (Fentanyl 5ml


 Vial)  250 mcg  1X  ONCE  5/8/20 09:15


 5/8/20 09:16 DC 5/8/20 09:30


50 MCG


 


 Furosemide


  (Lasix)  40 mg  1X  ONCE  5/18/20 21:45


 5/18/20 21:48 DC 5/18/20 21:51


40 MG


 


 Haloperidol


 Lactate


  (Haldol Inj)  3 mg  1X  ONCE  5/4/20 14:30


 5/4/20 14:31 DC 5/4/20 14:37


3 MG


 


 Heparin Sodium


  (Porcine)


  (Hep Lock Adult)  500 unit  STK-MED ONCE  4/7/20 09:29


 4/7/20 09:30 DC  





 


 Heparin Sodium


  (Porcine)


  (Heparin Sodium)  5,000 unit  Q12HR  4/27/20 21:00


 5/7/20 09:59 DC 5/6/20 20:57


5,000 UNIT


 


 Heparin Sodium


  (Porcine) 1000


 unit/Sodium


 Chloride  1,001 ml @ 


 1,001 mls/hr  1X  ONCE  4/27/20 12:00


 4/27/20 12:59 DC  





 


 Hydromorphone HCl


  (Dilaudid


 Standard PCA)  12 mg  STK-MED ONCE  5/1/20 15:50


 5/12/20 11:24 DC  





 


 Hydromorphone HCl


  (Dilaudid)  1 mg  PRN Q4HRS  PRN  5/4/20 19:00


 5/18/20 17:10 DC 5/18/20 06:25


1 MG


 


 Info


  (CONTRAST GIVEN


 -- Rx MONITORING)  1 each  PRN DAILY  PRN  3/30/20 11:45


 4/1/20 11:44 DC  





 


 Info


  (Icu Electrolyte


 Protocol)  1 ea  CONT PRN  PRN  3/29/20 13:15


     





 


 Info


  (PHARMACY


 MONITORING -- do


 not chart)  1 each  PRN DAILY  PRN  4/24/20 15:45


     





 


 Info


  (Tpn Per


 Pharmacy)  1 each  PRN DAILY  PRN  3/18/20 12:30


   UNV  





 


 Insulin Human


 Lispro


  (HumaLOG)  0-9 UNITS  Q6HRS  3/16/20 09:30


    5/19/20 05:33


4 UNITS


 


 Insulin Human


 Regular


  (HumuLIN R VIAL)  5 unit  1X  ONCE  3/17/20 22:30


 3/17/20 22:31 DC 3/17/20 22:14


5 UNIT


 


 Iohexol


  (Omnipaque 240


 Mg/ml)  30 ml  1X  ONCE  3/30/20 11:30


 3/30/20 11:33 DC 3/30/20 11:30


30 ML


 


 Iohexol


  (Omnipaque 300


 Mg/ml)  50 ml  STK-MED ONCE  4/27/20 10:58


 4/27/20 10:59 DC  





 


 Iohexol


  (Omnipaque 350


 Mg/ml)  90 ml  1X  ONCE  3/16/20 03:30


 3/16/20 03:31 DC 3/16/20 03:25


90 ML


 


 Ketorolac


 Tromethamine


  (Toradol 30mg


 Vial)  30 mg  1X  ONCE  3/16/20 03:00


 3/16/20 03:01 DC 3/16/20 02:54


30 MG


 


 Lidocaine HCl


  (Buffered


 Lidocaine 1%)  6 ml  1X  ONCE  5/12/20 14:15


 5/12/20 14:16 DC 5/12/20 14:15


3 ML


 


 Lidocaine HCl


  (Glydo


  (Lidocaine) Jelly)  1 thomas  1X  ONCE  3/20/20 14:30


 3/20/20 14:31 DC 3/20/20 16:38


1 THOMAS


 


 Lidocaine HCl


  (Xylocaine-Mpf


 1% 2ml Vial)  2 ml  PRN 1X  PRN  4/27/20 07:00


 4/28/20 06:59 DC  





 


 Linezolid/Dextrose  300 ml @ 


 300 mls/hr  Q12HR  5/17/20 09:00


    5/18/20 21:00


300 MLS/HR


 


 Lorazepam


  (Ativan Inj)  2 mg  PRN Q4HRS  PRN  5/17/20 19:15


    5/18/20 22:20


2 MG


 


 Magnesium Sulfate  50 ml @ 25


 mls/hr  1X  ONCE  5/10/20 09:00


 5/10/20 10:59 DC 5/10/20 10:44


25 MLS/HR


 


 Meropenem 1 gm/


 Sodium Chloride  100 ml @ 


 200 mls/hr  Q8HRS  4/28/20 14:00


    5/19/20 05:30


200 MLS/HR


 


 Meropenem 500 mg/


 Sodium Chloride  50 ml @ 


 100 mls/hr  Q12H  4/8/20 10:00


 4/28/20 12:37 DC 4/28/20 10:45


100 MLS/HR


 


 Metoclopramide HCl


  (Reglan Vial)  10 mg  PRN Q3HRS  PRN  5/9/20 16:45


    5/14/20 04:25


10 MG


 


 Metoprolol


 Tartrate


  (Lopressor Vial)  5 mg  1X  ONCE  5/17/20 15:15


 5/17/20 15:16 DC 5/17/20 15:31


5 MG


 


 Metronidazole  100 ml @ 


 100 mls/hr  Q8HRS  4/14/20 10:00


 4/21/20 08:10 DC 4/21/20 06:04


100 MLS/HR


 


 Micafungin Sodium


 100 mg/Dextrose  100 ml @ 


 100 mls/hr  Q24H  5/4/20 11:00


    5/18/20 11:07


100 MLS/HR


 


 Midazolam HCl


  (Versed)  5 mg  1X  ONCE  5/8/20 09:15


 5/8/20 09:16 DC 5/8/20 09:30


1 MG


 


 Midazolam HCl 100


 mg/Sodium Chloride  100 ml @ 7


 mls/hr  CONT  PRN  3/28/20 16:00


    4/8/20 15:35


7 MLS/HR


 


 Midazolam HCl 50


 mg/Sodium Chloride  50 ml @ 0


 mls/hr  CONT  PRN  3/23/20 08:15


 3/28/20 15:59 DC 3/26/20 22:39


7 MLS/HR


 


 Morphine Sulfate


  (Morphine


 Sulfate)  2 mg  PRN Q2HR  PRN  3/16/20 05:00


 3/17/20 14:15 DC 3/17/20 12:26


2 MG


 


 Multi-Ingred


 Cream/Lotion/Oil/


 Oint


  (Artificial


 Tears Eye


 Ointment)  1 thomas  PRN Q1HR  PRN  3/25/20 17:30


    4/13/20 08:19


1 THOMAS


 


 Naloxone HCl


  (Narcan)  0.4 mg  PRN Q2MIN  PRN  4/27/20 14:30


   UNV  





 


 Norepinephrine


 Bitartrate 8 mg/


 Dextrose  258 ml @ 


 17.299 mls/


 hr  CONT  PRN  3/17/20 15:30


 4/17/20 09:19 DC 4/14/20 12:48


20.9 MLS/HR


 


 Ondansetron HCl


  (Zofran)  4 mg  STK-MED ONCE  4/27/20 10:56


 4/27/20 10:57 DC  





 


 Pantoprazole


 Sodium


  (PROTONIX VIAL


 for IV PUSH)  40 mg  DAILYAC  3/16/20 11:30


    5/18/20 08:07


40 MG


 


 Phenylephrine HCl


  (PHENYLEPHRINE


 in 0.9% NACL PF)  1 mg  STK-MED ONCE  4/27/20 12:34


 4/27/20 12:34 DC  





 


 Piperacillin Sod/


 Tazobactam Sod


 4.5 gm/Sodium


 Chloride  100 ml @ 


 200 mls/hr  1X  ONCE  3/16/20 06:00


 3/16/20 06:29 DC 3/16/20 05:44


200 MLS/HR


 


 Potassium


 Chloride 15 meq/


 Bicarbonate


 Dialysis Soln w/


 out KCl  5,007.5 ml


  @ 1,000 mls/


 hr  Q5H1M  3/29/20 20:00


 4/2/20 13:08 DC 4/1/20 18:14


1,000 MLS/HR


 


 Potassium


 Chloride 20 meq/


 Bicarbonate


 Dialysis Soln w/


 out KCl  5,010 ml @ 


 1,000 mls/hr  Q5H1M  3/25/20 16:00


 3/29/20 19:59 DC 3/29/20 14:54


1,000 MLS/HR


 


 Potassium


 Chloride 75 meq/


 Magnesium Sulfate


 15 meq/


 Multivitamins 10


 ml/Chromium/


 Copper/Manganese/


 Seleni/Zn 0.5 ml/


 Insulin Human


 Regular 15 unit/


 Total Parenteral


 Nutrition/Amino


 Acids/Dextrose/


 Fat Emulsion


 Intravenous  1,920 ml @ 


 80 mls/hr  TPN  CONT  5/9/20 22:00


 5/10/20 21:59 DC 5/9/20 22:41


80 MLS/HR


 


 Potassium


 Chloride 75 meq/


 Magnesium Sulfate


 15 meq/Calcium


 Gluconate 8 meq/


 Multivitamins 10


 ml/Chromium/


 Copper/Manganese/


 Seleni/Zn 0.5 ml/


 Insulin Human


 Regular 15 unit/


 Total Parenteral


 Nutrition/Amino


 Acids/Dextrose/


 Fat Emulsion


 Intravenous  1,920 ml @ 


 80 mls/hr  TPN  CONT  5/7/20 22:00


 5/8/20 21:59 DC 5/7/20 22:28


80 MLS/HR


 


 Potassium


 Chloride 75 meq/


 Magnesium Sulfate


 15 meq/Calcium


 Gluconate 8 meq/


 Multivitamins 10


 ml/Chromium/


 Copper/Manganese/


 Seleni/Zn 0.5 ml/


 Insulin Human


 Regular 20 unit/


 Total Parenteral


 Nutrition/Amino


 Acids/Dextrose/


 Fat Emulsion


 Intravenous  1,920 ml @ 


 80 mls/hr  TPN  CONT  5/6/20 22:00


 5/7/20 21:59 DC 5/6/20 22:00


80 MLS/HR


 


 Potassium


 Chloride 75 meq/


 Magnesium Sulfate


 15 meq/Calcium


 Gluconate 8 meq/


 Multivitamins 10


 ml/Chromium/


 Copper/Manganese/


 Seleni/Zn 0.5 ml/


 Insulin Human


 Regular 25 unit/


 Total Parenteral


 Nutrition/Amino


 Acids/Dextrose/


 Fat Emulsion


 Intravenous  1,920 ml @ 


 80 mls/hr  TPN  CONT  5/4/20 22:00


 5/5/20 21:59 DC 5/4/20 23:08


80 MLS/HR


 


 Potassium


 Chloride 75 meq/


 Magnesium Sulfate


 20 meq/Calcium


 Gluconate 10 meq/


 Multivitamins 10


 ml/Chromium/


 Copper/Manganese/


 Seleni/Zn 0.5 ml/


 Insulin Human


 Regular 25 unit/


 Total Parenteral


 Nutrition/Amino


 Acids/Dextrose/


 Fat Emulsion


 Intravenous  1,920 ml @ 


 80 mls/hr  TPN  CONT  5/3/20 22:00


 5/4/20 21:59 DC 5/3/20 22:04


80 MLS/HR


 


 Potassium


 Chloride 75 meq/


 Magnesium Sulfate


 20 meq/Calcium


 Gluconate 10 meq/


 Multivitamins 10


 ml/Chromium/


 Copper/Manganese/


 Seleni/Zn 0.5 ml/


 Insulin Human


 Regular 30 unit/


 Total Parenteral


 Nutrition/Amino


 Acids/Dextrose/


 Fat Emulsion


 Intravenous  1,920 ml @ 


 80 mls/hr  TPN  CONT  5/2/20 22:00


 5/3/20 22:00 DC 5/2/20 21:51


80 MLS/HR


 


 Potassium


 Chloride 80 meq/


 Magnesium Sulfate


 20 meq/


 Multivitamins 10


 ml/Chromium/


 Copper/Manganese/


 Seleni/Zn 0.5 ml/


 Insulin Human


 Regular 15 unit/


 Total Parenteral


 Nutrition/Amino


 Acids/Dextrose/


 Fat Emulsion


 Intravenous  1,920 ml @ 


 80 mls/hr  TPN  CONT  5/12/20 22:00


 5/13/20 21:59 DC 5/12/20 21:40


80 MLS/HR


 


 Potassium


 Chloride 80 meq/


 Magnesium Sulfate


 20 meq/


 Multivitamins 10


 ml/Chromium/


 Copper/Manganese/


 Seleni/Zn 1 ml/


 Insulin Human


 Regular 15 unit/


 Total Parenteral


 Nutrition/Amino


 Acids/Dextrose/


 Fat Emulsion


 Intravenous  1,920 ml @ 


 80 mls/hr  TPN  CONT  5/13/20 22:00


 5/14/20 21:59 DC 5/13/20 22:04


80 MLS/HR


 


 Potassium


 Chloride 90 meq/


 Magnesium Sulfate


 20 meq/


 Multivitamins 10


 ml/Chromium/


 Copper/Manganese/


 Seleni/Zn 1 ml/


 Insulin Human


 Regular 15 unit/


 Total Parenteral


 Nutrition/Amino


 Acids/Dextrose/


 Fat Emulsion


 Intravenous  1,890 ml @ 


 78.75 mls/


 hr  TPN  CONT  5/18/20 22:00


 5/19/20 21:59  5/18/20 22:18


78.75 MLS/HR


 


 Potassium


 Chloride/Water  100 ml @ 


 100 mls/hr  1X  ONCE  5/14/20 11:00


 5/14/20 11:59 DC 5/14/20 11:34


100 MLS/HR


 


 Potassium


 Phosphate 20 mmol/


 Sodium Chloride  106.6667


 ml @ 


 51.667 m...  1X  ONCE  3/25/20 13:00


 3/25/20 15:03 DC 3/25/20 12:51


51.667 MLS/HR


 


 Potassium Acetate


 30 meq/Magnesium


 Sulfate 20 meq/


 Calcium Gluconate


 10 meq/


 Multivitamins 10


 ml/Chromium/


 Copper/Manganese/


 Seleni/Zn 0.5 ml/


 Insulin Human


 Regular 30 unit/


 Potassium


 Chloride 30 meq/


 Total Parenteral


 Nutrition/Amino


 Acids/Dextrose/


 Fat Emulsion


 Intravenous  1,920 ml @ 


 80 mls/hr  TPN  CONT  5/1/20 22:00


 5/2/20 21:59 DC 5/1/20 22:34


80 MLS/HR


 


 Potassium Acetate


 55 meq/Magnesium


 Sulfate 20 meq/


 Calcium Gluconate


 10 meq/


 Multivitamins 10


 ml/Chromium/


 Copper/Manganese/


 Seleni/Zn 0.5 ml/


 Insulin Human


 Regular 30 unit/


 Total Parenteral


 Nutrition/Amino


 Acids/Dextrose/


 Fat Emulsion


 Intravenous  1,920 ml @ 


 80 mls/hr  TPN  CONT  4/30/20 22:00


 5/1/20 21:59 DC 5/1/20 01:00


80 MLS/HR


 


 Potassium Acetate


 55 meq/Magnesium


 Sulfate 20 meq/


 Calcium Gluconate


 10 meq/


 Multivitamins 10


 ml/Chromium/


 Copper/Manganese/


 Seleni/Zn 0.5 ml/


 Insulin Human


 Regular 35 unit/


 Total Parenteral


 Nutrition/Amino


 Acids/Dextrose/


 Fat Emulsion


 Intravenous  1,920 ml @ 


 80 mls/hr  TPN  CONT  4/28/20 22:00


 4/29/20 21:59 DC 4/28/20 22:02


80 MLS/HR


 


 Potassium Acetate


 65 meq/Magnesium


 Sulfate 20 meq/


 Calcium Gluconate


 10 meq/


 Multivitamins 10


 ml/Chromium/


 Copper/Manganese/


 Seleni/Zn 0.5 ml/


 Insulin Human


 Regular 30 unit/


 Total Parenteral


 Nutrition/Amino


 Acids/Dextrose/


 Fat Emulsion


 Intravenous  1,920 ml @ 


 80 mls/hr  TPN  CONT  4/29/20 22:00


 4/30/20 21:59 DC 4/29/20 22:22


80 MLS/HR


 


 Prochlorperazine


 Edisylate


  (Compazine)  5 mg  PACU PRN  PRN  4/27/20 07:00


 4/28/20 06:59 DC  





 


 Propofol  20 ml @ As


 Directed  STK-MED ONCE  4/27/20 12:26


 4/27/20 12:27 DC  





 


 Ringer's Solution  1,000 ml @ 


 30 mls/hr  Q24H  4/27/20 07:00


 4/27/20 18:59 DC  





 


 Rocuronium Bromide


  (Zemuron)  50 mg  STK-MED ONCE  4/27/20 10:56


 4/27/20 10:57 DC  





 


 Sevoflurane


  (Ultane)  60 ml  STK-MED ONCE  4/27/20 12:26


 4/27/20 12:27 DC  





 


 Sodium


 Bicarbonate 50


 meq/Sodium


 Chloride  1,050 ml @ 


 75 mls/hr  Q14H  3/18/20 07:30


 3/23/20 10:28 DC 3/22/20 21:10


75 MLS/HR


 


 Sodium Acetate 50


 meq/Potassium


 Acetate 55 meq/


 Magnesium Sulfate


 20 meq/Calcium


 Gluconate 10 meq/


 Multivitamins 10


 ml/Chromium/


 Copper/Manganese/


 Seleni/Zn 0.5 ml/


 Insulin Human


 Regular 35 unit/


 Total Parenteral


 Nutrition/Amino


 Acids/Dextrose/


 Fat Emulsion


 Intravenous  1,800 ml @ 


 75 mls/hr  TPN  CONT  4/25/20 22:00


 4/26/20 21:59 DC 4/25/20 22:03


75 MLS/HR


 


 Sodium Chloride  1,000 ml @ 


 25 mls/hr  Q24H  4/27/20 14:30


   UNV  





 


 Sodium Chloride


  (Normal Saline


 Flush)  3 ml  QSHIFT  PRN  4/27/20 13:45


     





 


 Sodium Chloride


 90 meq/Calcium


 Gluconate 10 meq/


 Multivitamins 10


 ml/Chromium/


 Copper/Manganese/


 Seleni/Zn 0.5 ml/


 Total Parenteral


 Nutrition/Amino


 Acids/Dextrose/


 Fat Emulsion


 Intravenous  1,512 ml @ 


 63 mls/hr  TPN  CONT  3/18/20 22:00


 3/19/20 21:59 DC 3/18/20 22:06


63 MLS/HR


 


 Sodium Chloride


 90 meq/Calcium


 Gluconate 10 meq/


 Multivitamins 10


 ml/Chromium/


 Copper/Manganese/


 Seleni/Zn 1 ml/


 Total Parenteral


 Nutrition/Amino


 Acids/Dextrose/


 Fat Emulsion


 Intravenous  55.005 ml


  @ 2.292


 mls/hr  TPN  CONT  3/18/20 22:00


 3/18/20 12:33 DC  





 


 Sodium Chloride


 90 meq/Magnesium


 Sulfate 10 meq/


 Calcium Gluconate


 20 meq/


 Multivitamins 10


 ml/Chromium/


 Copper/Manganese/


 Seleni/Zn 0.5 ml/


 Total Parenteral


 Nutrition/Amino


 Acids/Dextrose/


 Fat Emulsion


 Intravenous  1,512 ml @ 


 63 mls/hr  TPN  CONT  3/19/20 22:00


 3/20/20 21:59 DC 3/19/20 22:25


63 MLS/HR


 


 Sodium Chloride


 90 meq/Magnesium


 Sulfate 12 meq/


 Calcium Gluconate


 15 meq/


 Multivitamins 10


 ml/Chromium/


 Copper/Manganese/


 Seleni/Zn 0.5 ml/


 Insulin Human


 Regular 25 unit/


 Total Parenteral


 Nutrition/Amino


 Acids/Dextrose/


 Fat Emulsion


 Intravenous  1,400 ml @ 


 58.333 mls/


 hr  TPN  CONT  4/8/20 22:00


 4/9/20 21:59 DC 4/8/20 21:41


58.333 MLS/HR


 


 Sodium Chloride


 90 meq/Potassium


 Chloride 15 meq/


 Magnesium Sulfate


 12 meq/Calcium


 Gluconate 15 meq/


 Multivitamins 10


 ml/Chromium/


 Copper/Manganese/


 Seleni/Zn 0.5 ml/


 Insulin Human


 Regular 25 unit/


 Total Parenteral


 Nutrition/Amino


 Acids/Dextrose/


 Fat Emulsion


 Intravenous  1,400 ml @ 


 58.333 mls/


 hr  TPN  CONT  4/7/20 22:00


 4/8/20 21:59 DC 4/7/20 22:13


58.333 MLS/HR


 


 Sodium Chloride


 90 meq/Potassium


 Chloride 15 meq/


 Potassium


 Phosphate 10 mmol/


 Magnesium Sulfate


 8 meq/Calcium


 Gluconate 15 meq/


 Multivitamins 10


 ml/Chromium/


 Copper/Manganese/


 Seleni/Zn 0.5 ml/


 Insulin Human


 Regular 25 unit/


 Total Parenteral


 Nutrition/Amino


 Acids/Dextrose/


 Fat Emulsion


 Intravenous  1,400 ml @ 


 58.333 mls/


 hr  TPN  CONT  4/5/20 22:00


 4/6/20 21:59 DC 4/5/20 21:20


58.333 MLS/HR


 


 Sodium Chloride


 90 meq/Potassium


 Chloride 15 meq/


 Potassium


 Phosphate 10 mmol/


 Magnesium Sulfate


 10 meq/Calcium


 Gluconate 20 meq/


 Multivitamins 10


 ml/Chromium/


 Copper/Manganese/


 Seleni/Zn 0.5 ml/


 Total Parenteral


 Nutrition/Amino


 Acids/Dextrose/


 Fat Emulsion


 Intravenous  1,400 ml @ 


 58.333 mls/


 hr  TPN  CONT  3/23/20 22:00


 3/24/20 21:59 DC 3/23/20 21:42


58.333 MLS/HR


 


 Sodium Chloride


 90 meq/Potassium


 Chloride 15 meq/


 Potassium


 Phosphate 10 mmol/


 Magnesium Sulfate


 12 meq/Calcium


 Gluconate 15 meq/


 Multivitamins 10


 ml/Chromium/


 Copper/Manganese/


 Seleni/Zn 0.5 ml/


 Insulin Human


 Regular 25 unit/


 Total Parenteral


 Nutrition/Amino


 Acids/Dextrose/


 Fat Emulsion


 Intravenous  1,400 ml @ 


 58.333 mls/


 hr  TPN  CONT  4/6/20 22:00


 4/7/20 21:59 DC 4/6/20 22:24


58.333 MLS/HR


 


 Sodium Chloride


 90 meq/Potassium


 Chloride 15 meq/


 Potassium


 Phosphate 15 mmol/


 Magnesium Sulfate


 10 meq/Calcium


 Gluconate 15 meq/


 Multivitamins 10


 ml/Chromium/


 Copper/Manganese/


 Seleni/Zn 0.5 ml/


 Total Parenteral


 Nutrition/Amino


 Acids/Dextrose/


 Fat Emulsion


 Intravenous  1,400 ml @ 


 58.333 mls/


 hr  TPN  CONT  3/24/20 22:00


 3/25/20 21:59 DC 3/24/20 22:17


58.333 MLS/HR


 


 Sodium Chloride


 90 meq/Potassium


 Chloride 15 meq/


 Potassium


 Phosphate 15 mmol/


 Magnesium Sulfate


 10 meq/Calcium


 Gluconate 20 meq/


 Multivitamins 10


 ml/Chromium/


 Copper/Manganese/


 Seleni/Zn 0.5 ml/


 Total Parenteral


 Nutrition/Amino


 Acids/Dextrose/


 Fat Emulsion


 Intravenous  1,200 ml @ 


 50 mls/hr  TPN  CONT  3/22/20 22:00


 3/22/20 14:17 DC  





 


 Sodium Chloride


 90 meq/Potassium


 Chloride 15 meq/


 Potassium


 Phosphate 18 mmol/


 Magnesium Sulfate


 8 meq/Calcium


 Gluconate 15 meq/


 Multivitamins 10


 ml/Chromium/


 Copper/Manganese/


 Seleni/Zn 0.5 ml/


 Insulin Human


 Regular 10 unit/


 Total Parenteral


 Nutrition/Amino


 Acids/Dextrose/


 Fat Emulsion


 Intravenous  1,400 ml @ 


 58.333 mls/


 hr  TPN  CONT  3/27/20 22:00


 3/28/20 21:59 DC 3/27/20 21:43


58.333 MLS/HR


 


 Sodium Chloride


 90 meq/Potassium


 Chloride 15 meq/


 Potassium


 Phosphate 18 mmol/


 Magnesium Sulfate


 8 meq/Calcium


 Gluconate 15 meq/


 Multivitamins 10


 ml/Chromium/


 Copper/Manganese/


 Seleni/Zn 0.5 ml/


 Insulin Human


 Regular 15 unit/


 Total Parenteral


 Nutrition/Amino


 Acids/Dextrose/


 Fat Emulsion


 Intravenous  1,400 ml @ 


 58.333 mls/


 hr  TPN  CONT  3/30/20 22:00


 3/31/20 21:59 DC 3/30/20 21:47


58.333 MLS/HR


 


 Sodium Chloride


 90 meq/Potassium


 Chloride 15 meq/


 Potassium


 Phosphate 18 mmol/


 Magnesium Sulfate


 8 meq/Calcium


 Gluconate 15 meq/


 Multivitamins 10


 ml/Chromium/


 Copper/Manganese/


 Seleni/Zn 0.5 ml/


 Insulin Human


 Regular 20 unit/


 Total Parenteral


 Nutrition/Amino


 Acids/Dextrose/


 Fat Emulsion


 Intravenous  1,400 ml @ 


 58.333 mls/


 hr  TPN  CONT  4/2/20 22:00


 4/3/20 21:59 DC 4/2/20 22:45


58.333 MLS/HR


 


 Sodium Chloride


 90 meq/Potassium


 Chloride 15 meq/


 Potassium


 Phosphate 18 mmol/


 Magnesium Sulfate


 8 meq/Calcium


 Gluconate 15 meq/


 Multivitamins 10


 ml/Chromium/


 Copper/Manganese/


 Seleni/Zn 0.5 ml/


 Total Parenteral


 Nutrition/Amino


 Acids/Dextrose/


 Fat Emulsion


 Intravenous  1,400 ml @ 


 58.333 mls/


 hr  TPN  CONT  3/26/20 22:00


 3/27/20 21:59 DC 3/26/20 22:00


58.333 MLS/HR


 


 Sodium Chloride


 90 meq/Potassium


 Phosphate 15 mmol/


 Magnesium Sulfate


 12 meq/Calcium


 Gluconate 15 meq/


 Multivitamins 10


 ml/Chromium/


 Copper/Manganese/


 Seleni/Zn 0.5 ml/


 Insulin Human


 Regular 30 unit/


 Total Parenteral


 Nutrition/Amino


 Acids/Dextrose/


 Fat Emulsion


 Intravenous  1,400 ml @ 


 58.333 mls/


 hr  TPN  CONT  4/10/20 22:00


 4/11/20 21:59 DC 4/10/20 21:49


58.333 MLS/HR


 


 Sodium Chloride


 90 meq/Potassium


 Phosphate 15 mmol/


 Magnesium Sulfate


 12 meq/Calcium


 Gluconate 15 meq/


 Multivitamins 10


 ml/Chromium/


 Copper/Manganese/


 Seleni/Zn 0.5 ml/


 Insulin Human


 Regular 40 unit/


 Total Parenteral


 Nutrition/Amino


 Acids/Dextrose/


 Fat Emulsion


 Intravenous  1,400 ml @ 


 58.333 mls/


 hr  TPN  CONT  4/11/20 22:00


 4/12/20 21:59 DC 4/11/20 21:21


58.333 MLS/HR


 


 Sodium Chloride


 90 meq/Potassium


 Phosphate 19 mmol/


 Magnesium Sulfate


 12 meq/Calcium


 Gluconate 15 meq/


 Multivitamins 10


 ml/Chromium/


 Copper/Manganese/


 Seleni/Zn 0.5 ml/


 Insulin Human


 Regular 40 unit/


 Total Parenteral


 Nutrition/Amino


 Acids/Dextrose/


 Fat Emulsion


 Intravenous  1,400 ml @ 


 58.333 mls/


 hr  TPN  CONT  4/12/20 22:00


 4/13/20 21:59 DC 4/12/20 21:54


58.333 MLS/HR


 


 Sodium Chloride


 90 meq/Potassium


 Phosphate 5 mmol/


 Magnesium Sulfate


 12 meq/Calcium


 Gluconate 15 meq/


 Multivitamins 10


 ml/Chromium/


 Copper/Manganese/


 Seleni/Zn 0.5 ml/


 Insulin Human


 Regular 30 unit/


 Total Parenteral


 Nutrition/Amino


 Acids/Dextrose/


 Fat Emulsion


 Intravenous  1,400 ml @ 


 58.333 mls/


 hr  TPN  CONT  4/9/20 22:00


 4/10/20 21:59 DC 4/9/20 22:08


58.333 MLS/HR


 


 Sodium Chloride


 100 meq/Potassium


 Chloride 40 meq/


 Magnesium Sulfate


 15 meq/Calcium


 Gluconate 15 meq/


 Multivitamins 10


 ml/Chromium/


 Copper/Manganese/


 Seleni/Zn 0.5 ml/


 Insulin Human


 Regular 35 unit/


 Total Parenteral


 Nutrition/Amino


 Acids/Dextrose/


 Fat Emulsion


 Intravenous  1,400 ml @ 


 58.333 mls/


 hr  TPN  CONT  4/19/20 22:00


 4/20/20 21:59 DC 4/19/20 22:46


58.333 MLS/HR


 


 Sodium Chloride


 100 meq/Potassium


 Chloride 40 meq/


 Magnesium Sulfate


 20 meq/Calcium


 Gluconate 10 meq/


 Multivitamins 10


 ml/Chromium/


 Copper/Manganese/


 Seleni/Zn 0.5 ml/


 Insulin Human


 Regular 35 unit/


 Total Parenteral


 Nutrition/Amino


 Acids/Dextrose/


 Fat Emulsion


 Intravenous  1,400 ml @ 


 58.333 mls/


 hr  TPN  CONT  4/23/20 22:00


 4/24/20 21:59 DC 4/24/20 00:06


58.333 MLS/HR


 


 Sodium Chloride


 100 meq/Potassium


 Chloride 40 meq/


 Magnesium Sulfate


 20 meq/Calcium


 Gluconate 15 meq/


 Multivitamins 10


 ml/Chromium/


 Copper/Manganese/


 Seleni/Zn 0.5 ml/


 Insulin Human


 Regular 35 unit/


 Total Parenteral


 Nutrition/Amino


 Acids/Dextrose/


 Fat Emulsion


 Intravenous  1,400 ml @ 


 58.333 mls/


 hr  TPN  CONT  4/22/20 22:00


 4/23/20 21:59 DC 4/22/20 22:27


58.333 MLS/HR


 


 Sodium Chloride


 100 meq/Potassium


 Phosphate 10 mmol/


 Magnesium Sulfate


 12 meq/Calcium


 Gluconate 15 meq/


 Multivitamins 10


 ml/Chromium/


 Copper/Manganese/


 Seleni/Zn 0.5 ml/


 Insulin Human


 Regular 35 unit/


 Potassium


 Chloride 20 meq/


 Total Parenteral


 Nutrition/Amino


 Acids/Dextrose/


 Fat Emulsion


 Intravenous  1,400 ml @ 


 58.333 mls/


 hr  TPN  CONT  4/16/20 22:00


 4/17/20 21:59 DC 4/16/20 22:10


58.333 MLS/HR


 


 Sodium Chloride


 100 meq/Potassium


 Phosphate 19 mmol/


 Magnesium Sulfate


 12 meq/Calcium


 Gluconate 15 meq/


 Multivitamins 10


 ml/Chromium/


 Copper/Manganese/


 Seleni/Zn 0.5 ml/


 Insulin Human


 Regular 40 unit/


 Potassium


 Chloride 20 meq/


 Total Parenteral


 Nutrition/Amino


 Acids/Dextrose/


 Fat Emulsion


 Intravenous  1,400 ml @ 


 58.333 mls/


 hr  TPN  CONT  4/15/20 22:00


 4/16/20 21:59 DC 4/15/20 21:20


58.333 MLS/HR


 


 Sodium Chloride


 100 meq/Potassium


 Phosphate 5 mmol/


 Magnesium Sulfate


 12 meq/Calcium


 Gluconate 15 meq/


 Multivitamins 10


 ml/Chromium/


 Copper/Manganese/


 Seleni/Zn 0.5 ml/


 Insulin Human


 Regular 35 unit/


 Potassium


 Chloride 20 meq/


 Total Parenteral


 Nutrition/Amino


 Acids/Dextrose/


 Fat Emulsion


 Intravenous  1,400 ml @ 


 58.333 mls/


 hr  TPN  CONT  4/17/20 22:00


 4/18/20 21:59 DC 4/17/20 22:59


58.333 MLS/HR


 


 Succinylcholine


 Chloride


  (Anectine)  120 mg  1X  ONCE  3/23/20 08:30


 3/23/20 08:31 DC 3/23/20 08:34


120 MG


 


 Vecuronium Bromide


  (Norcuron Bolus)  6 mg  PRN Q6HRS  PRN  5/7/20 19:15


 5/7/20 19:35 DC  














Labs:


Lab





Laboratory Tests








Test


 5/18/20


11:05 5/18/20


17:03 5/18/20


23:33 5/19/20


00:09


 


Glucose (Fingerstick)


 236 mg/dL


(70-99) 179 mg/dL


(70-99) 


 227 mg/dL


(70-99)


 


O2 Saturation   98 % (92-99)  


 


Arterial Blood pH


 


 


 7.42


(7.35-7.45) 





 


Arterial Blood pCO2 at


Patient Temp 


 


 78 mmHg


(35-46) 





 


Arterial Blood pO2 at Patient


Temp 


 


 122 mmHg


() 





 


Arterial Blood HCO3


 


 


 49 mmol/L


(21-28) 





 


Arterial Blood Base Excess


 


 


 22 mmol/L


(-3-3) 





 


FiO2   32  


 


Test


 5/19/20


05:10 5/19/20


05:20 


 





 


White Blood Count


 11.3 x10^3/uL


(4.0-11.0) 


 


 





 


Red Blood Count


 2.58 x10^6/uL


(3.50-5.40) 


 


 





 


Hemoglobin


 7.4 g/dL


(12.0-15.5) 


 


 





 


Hematocrit


 22.7 %


(36.0-47.0) 


 


 





 


Mean Corpuscular Volume 88 fL ()    


 


Mean Corpuscular Hemoglobin 29 pg (25-35)    


 


Mean Corpuscular Hemoglobin


Concent 32 g/dL


(31-37) 


 


 





 


Red Cell Distribution Width


 18.1 %


(11.5-14.5) 


 


 





 


Platelet Count


 485 x10^3/uL


(140-400) 


 


 





 


Neutrophils (%) (Auto) 74 % (31-73)    


 


Lymphocytes (%) (Auto) 19 % (24-48)    


 


Monocytes (%) (Auto) 7 % (0-9)    


 


Eosinophils (%) (Auto) 1 % (0-3)    


 


Basophils (%) (Auto) 0 % (0-3)    


 


Neutrophils # (Auto)


 8.3 x10^3/uL


(1.8-7.7) 


 


 





 


Lymphocytes # (Auto)


 2.1 x10^3/uL


(1.0-4.8) 


 


 





 


Monocytes # (Auto)


 0.7 x10^3/uL


(0.0-1.1) 


 


 





 


Eosinophils # (Auto)


 0.1 x10^3/uL


(0.0-0.7) 


 


 





 


Basophils # (Auto)


 0.0 x10^3/uL


(0.0-0.2) 


 


 





 


Glucose (Fingerstick)


 


 153 mg/dL


(70-99) 


 














Objective:


Assessment:


Fever intermittent could be from underlying pancreatitis,  


Acute pancreatitis with persistent  necrosis


CT a/p 4/9


    Increased ascites. Persistent evidence of necrotizing pancreatitis with 

fluid and phlegmon


   at the pancreas


   4/27 status post ROBERT drain placement; 


Cholelithiasis with thickening of the gallbladder wall.


Leucocytosis 


JED,Hyperkalemia, Metabolic acidosis off dialysis


Acute hypoxic resp failure ,bilateral pleural effusion and atelectasis


hypocalcemia 


Prediabetes


HTN


s/p trach





Plan:


Plan of Care





 Zyvox/Dapto 5/17


cont merrem 4/8


Continue micafungin 5/4


f/u cultures ,BC neg 5/17


Maintain aspiration precaution


Supportive care





D/w nursing











IVAN FRANZ MD           May 19, 2020 07:30 Consent: The patient's consent was obtained including but not limited to risks of crusting, scabbing, blistering, scarring, darker or lighter pigmentary change, recurrence, incomplete removal and infection. Render Post-Care Instructions In Note?: yes Post-Care Instructions: I reviewed with the patient in detail post-care instructions. Patient is to wear sunprotection, and avoid picking at any of the treated lesions. Pt may apply Vaseline to crusted or scabbing areas. Render Note In Bullet Format When Appropriate: No Detail Level: Detailed

## 2023-08-03 NOTE — NUR
Pharmacy TPN Dosing Note



S: JESENIA RICH is a 49 year old F Currently receiving Central Continuous TPN started 
03/18/20



B:Pertinent PMH: Necrotizing pancreatitis

Height: 5 feet, 8 inches

Weight: 92.5 kg



Current diet: NPO 



LABS:

Sodium:    145 

Potassium: 3.6 

Chloride:  105 

Calcium:   8.6 

Corrected Calcium: 10.36 

Magnesium: 2.6 

CO2:       36 

SCr:       0.7 

Glucose:   119-179 

Albumin:   1.8 

AST:       50 

ALT:       50 



TPN FORMULA:

TPN TYPE:  Central Continuous

AMINO ACIDS:         90 gm

DEXTROSE:            225 gm

LIPIDS:              30 gm



POTASSIUM CHLORIDE:  80 mEq

MAGNESIUM:           20 mEq

INSULIN:             15 units

MULTIPLE VITAMIN:    10 ml

TRACE ELEMENTS:      0.5 ml(s)



TPN PLAN: no change in tpn





R: Continue TPN 

Will monitor electrolytes, glucose, and tolerance to TPN.



 Maribell Vazquez RPH, 05/12/20 1131 25.8

## 2024-12-17 NOTE — NUR
Pharmacy TPN Dosing Note



S: JESENIA BEAN is a 49 year old F Currently receiving Central Continuous TPN started 
03/18/20



B:Pertinent PMH: Necrotizing pancreatitis

Height: 5 feet, 8 inches

Weight: 75.966498 kg



Current diet: NPO 



LABS:

Sodium:    137 

Potassium: 4 

Chloride:  98 

Calcium:   10.1 

Corrected Calcium: 11.46 

Magnesium: 2.3 

CO2:       30 

SCr:       0.9 

Glucose:   142-170 

Albumin:   2.3 

AST:       26 

ALT:       25 



TPN FORMULA:

TPN TYPE:  Central Continuous

AMINO ACIDS:         75 gm

DEXTROSE:            250 gm

LIPIDS:              40 gm

POTASSIUM CHLORIDE:  70 mEq

MAGNESIUM:           20 mEq

INSULIN:             15 units

MULTIPLE VITAMIN:    10 ml

TRACE ELEMENTS:      1 ml(s)



TPN PLAN:  

- cont current lipids. Labs WNL and BG at goal.

-CMP in AM per ID.





R: Continue same TPN formula.

Will monitor electrolytes, glucose, and tolerance to TPN.



 LORENZO LESLIE RPH, 05/28/20 [id number] elev bp